# Patient Record
Sex: MALE | Race: WHITE | NOT HISPANIC OR LATINO | Employment: FULL TIME | URBAN - METROPOLITAN AREA
[De-identification: names, ages, dates, MRNs, and addresses within clinical notes are randomized per-mention and may not be internally consistent; named-entity substitution may affect disease eponyms.]

---

## 2017-01-12 ENCOUNTER — ALLSCRIPTS OFFICE VISIT (OUTPATIENT)
Dept: OTHER | Facility: OTHER | Age: 37
End: 2017-01-12

## 2017-01-13 ENCOUNTER — ALLSCRIPTS OFFICE VISIT (OUTPATIENT)
Dept: OTHER | Facility: OTHER | Age: 37
End: 2017-01-13

## 2017-01-23 ENCOUNTER — ALLSCRIPTS OFFICE VISIT (OUTPATIENT)
Dept: OTHER | Facility: OTHER | Age: 37
End: 2017-01-23

## 2017-01-23 ENCOUNTER — ANESTHESIA EVENT (OUTPATIENT)
Dept: PERIOP | Facility: HOSPITAL | Age: 37
End: 2017-01-23
Payer: COMMERCIAL

## 2017-01-23 DIAGNOSIS — I10 ESSENTIAL (PRIMARY) HYPERTENSION: ICD-10-CM

## 2017-01-23 DIAGNOSIS — I25.10 ATHEROSCLEROTIC HEART DISEASE OF NATIVE CORONARY ARTERY WITHOUT ANGINA PECTORIS: ICD-10-CM

## 2017-01-23 LAB
BASOPHILS # BLD AUTO: 0 %
BASOPHILS # BLD AUTO: 0 X10E3/UL (ref 0–0.2)
DEPRECATED RDW RBC AUTO: 13.5 % (ref 12.3–15.4)
EOSINOPHIL # BLD AUTO: 0.2 X10E3/UL (ref 0–0.4)
EOSINOPHIL # BLD AUTO: 3 %
HCT VFR BLD AUTO: 43.8 % (ref 37.5–51)
HGB BLD-MCNC: 14.5 G/DL (ref 12.6–17.7)
IMM.GRANULOCYTES (CD4/8) (HISTORICAL): 0 %
IMM.GRANULOCYTES (CD4/8) (HISTORICAL): 0 X10E3/UL (ref 0–0.1)
LYMPHOCYTES # BLD AUTO: 2.2 X10E3/UL (ref 0.7–3.1)
LYMPHOCYTES # BLD AUTO: 27 %
MCH RBC QN AUTO: 29.4 PG (ref 26.6–33)
MCHC RBC AUTO-ENTMCNC: 33.1 G/DL (ref 31.5–35.7)
MCV RBC AUTO: 89 FL (ref 79–97)
MONOCYTES # BLD AUTO: 0.5 X10E3/UL (ref 0.1–0.9)
MONOCYTES (HISTORICAL): 7 %
NEUTROPHILS # BLD AUTO: 5.1 X10E3/UL (ref 1.4–7)
NEUTROPHILS # BLD AUTO: 63 %
PLATELET # BLD AUTO: 279 X10E3/UL (ref 150–379)
RBC (HISTORICAL): 4.94 X10E6/UL (ref 4.14–5.8)
WBC # BLD AUTO: 8.1 X10E3/UL (ref 3.4–10.8)

## 2017-01-24 ENCOUNTER — ANESTHESIA (OUTPATIENT)
Dept: PERIOP | Facility: HOSPITAL | Age: 37
End: 2017-01-24
Payer: COMMERCIAL

## 2017-01-24 ENCOUNTER — HOSPITAL ENCOUNTER (OUTPATIENT)
Facility: HOSPITAL | Age: 37
Setting detail: OUTPATIENT SURGERY
Discharge: HOME | End: 2017-01-24
Attending: SPECIALIST | Admitting: SPECIALIST
Payer: COMMERCIAL

## 2017-01-24 VITALS
HEART RATE: 83 BPM | SYSTOLIC BLOOD PRESSURE: 159 MMHG | OXYGEN SATURATION: 93 % | RESPIRATION RATE: 23 BRPM | DIASTOLIC BLOOD PRESSURE: 83 MMHG | TEMPERATURE: 98.1 F

## 2017-01-24 DIAGNOSIS — L05.01 PILONIDAL CYST WITH ABSCESS: ICD-10-CM

## 2017-01-24 LAB — GLUCOSE SERPL-MCNC: 136 MG/DL (ref 65–140)

## 2017-01-24 PROCEDURE — 87070 CULTURE OTHR SPECIMN AEROBIC: CPT | Performed by: SPECIALIST

## 2017-01-24 PROCEDURE — 87147 CULTURE TYPE IMMUNOLOGIC: CPT | Performed by: SPECIALIST

## 2017-01-24 PROCEDURE — 82948 REAGENT STRIP/BLOOD GLUCOSE: CPT

## 2017-01-24 PROCEDURE — 87185 SC STD ENZYME DETCJ PER NZM: CPT | Performed by: SPECIALIST

## 2017-01-24 PROCEDURE — 87176 TISSUE HOMOGENIZATION CULTR: CPT | Performed by: SPECIALIST

## 2017-01-24 PROCEDURE — 87075 CULTR BACTERIA EXCEPT BLOOD: CPT | Performed by: SPECIALIST

## 2017-01-24 PROCEDURE — 87205 SMEAR GRAM STAIN: CPT | Performed by: SPECIALIST

## 2017-01-24 RX ORDER — ONDANSETRON 2 MG/ML
4 INJECTION INTRAMUSCULAR; INTRAVENOUS ONCE
Status: DISCONTINUED | OUTPATIENT
Start: 2017-01-24 | End: 2017-01-24 | Stop reason: HOSPADM

## 2017-01-24 RX ORDER — BUPIVACAINE HYDROCHLORIDE 5 MG/ML
INJECTION, SOLUTION PERINEURAL AS NEEDED
Status: DISCONTINUED | OUTPATIENT
Start: 2017-01-24 | End: 2017-01-24 | Stop reason: HOSPADM

## 2017-01-24 RX ORDER — FENTANYL CITRATE 50 UG/ML
INJECTION, SOLUTION INTRAMUSCULAR; INTRAVENOUS AS NEEDED
Status: DISCONTINUED | OUTPATIENT
Start: 2017-01-24 | End: 2017-01-24 | Stop reason: SURG

## 2017-01-24 RX ORDER — LEVOFLOXACIN 750 MG/1
750 TABLET ORAL DAILY
Qty: 10 TABLET | Refills: 0 | Status: SHIPPED | OUTPATIENT
Start: 2017-01-24 | End: 2017-02-03

## 2017-01-24 RX ORDER — FENTANYL CITRATE/PF 50 MCG/ML
25 SYRINGE (ML) INJECTION
Status: DISCONTINUED | OUTPATIENT
Start: 2017-01-24 | End: 2017-01-24 | Stop reason: HOSPADM

## 2017-01-24 RX ORDER — LIDOCAINE HYDROCHLORIDE AND EPINEPHRINE 10; 10 MG/ML; UG/ML
INJECTION, SOLUTION INFILTRATION; PERINEURAL AS NEEDED
Status: DISCONTINUED | OUTPATIENT
Start: 2017-01-24 | End: 2017-01-24 | Stop reason: HOSPADM

## 2017-01-24 RX ORDER — SODIUM CHLORIDE, SODIUM LACTATE, POTASSIUM CHLORIDE, CALCIUM CHLORIDE 600; 310; 30; 20 MG/100ML; MG/100ML; MG/100ML; MG/100ML
125 INJECTION, SOLUTION INTRAVENOUS CONTINUOUS
Status: DISCONTINUED | OUTPATIENT
Start: 2017-01-24 | End: 2017-01-24 | Stop reason: HOSPADM

## 2017-01-24 RX ORDER — ONDANSETRON 2 MG/ML
INJECTION INTRAMUSCULAR; INTRAVENOUS AS NEEDED
Status: DISCONTINUED | OUTPATIENT
Start: 2017-01-24 | End: 2017-01-24 | Stop reason: SURG

## 2017-01-24 RX ORDER — MIDAZOLAM HYDROCHLORIDE 1 MG/ML
INJECTION INTRAMUSCULAR; INTRAVENOUS AS NEEDED
Status: DISCONTINUED | OUTPATIENT
Start: 2017-01-24 | End: 2017-01-24 | Stop reason: SURG

## 2017-01-24 RX ORDER — PROPOFOL 10 MG/ML
INJECTION, EMULSION INTRAVENOUS AS NEEDED
Status: DISCONTINUED | OUTPATIENT
Start: 2017-01-24 | End: 2017-01-24 | Stop reason: SURG

## 2017-01-24 RX ORDER — OXYCODONE AND ACETAMINOPHEN 10; 325 MG/1; MG/1
1 TABLET ORAL EVERY 4 HOURS PRN
Qty: 28 TABLET | Refills: 0 | Status: SHIPPED | OUTPATIENT
Start: 2017-01-24 | End: 2017-02-03

## 2017-01-24 RX ORDER — LEVOFLOXACIN 5 MG/ML
500 INJECTION, SOLUTION INTRAVENOUS ONCE
Status: COMPLETED | OUTPATIENT
Start: 2017-01-24 | End: 2017-01-24

## 2017-01-24 RX ADMIN — FENTANYL CITRATE 100 MCG: 50 INJECTION, SOLUTION INTRAMUSCULAR; INTRAVENOUS at 13:34

## 2017-01-24 RX ADMIN — LEVOFLOXACIN 500 MG: 5 INJECTION, SOLUTION INTRAVENOUS at 12:37

## 2017-01-24 RX ADMIN — MIDAZOLAM HYDROCHLORIDE 2 MG: 1 INJECTION, SOLUTION INTRAMUSCULAR; INTRAVENOUS at 13:28

## 2017-01-24 RX ADMIN — DEXAMETHASONE SODIUM PHOSPHATE 8 MG: 10 INJECTION INTRAMUSCULAR; INTRAVENOUS at 13:54

## 2017-01-24 RX ADMIN — PROPOFOL 200 MG: 10 INJECTION, EMULSION INTRAVENOUS at 13:34

## 2017-01-24 RX ADMIN — SODIUM CHLORIDE, SODIUM LACTATE, POTASSIUM CHLORIDE, AND CALCIUM CHLORIDE: .6; .31; .03; .02 INJECTION, SOLUTION INTRAVENOUS at 13:27

## 2017-01-24 RX ADMIN — ONDANSETRON 4 MG: 2 INJECTION INTRAMUSCULAR; INTRAVENOUS at 13:54

## 2017-01-24 RX ADMIN — PROPOFOL 100 MG: 10 INJECTION, EMULSION INTRAVENOUS at 13:37

## 2017-01-26 LAB
BACTERIA TISS AEROBE CULT: NORMAL
GRAM STN SPEC: NORMAL

## 2017-01-28 LAB
BACTERIA SPEC ANAEROBE CULT: NORMAL
BACTERIA SPEC ANAEROBE CULT: NORMAL

## 2017-01-30 ENCOUNTER — ALLSCRIPTS OFFICE VISIT (OUTPATIENT)
Dept: OTHER | Facility: OTHER | Age: 37
End: 2017-01-30

## 2017-02-02 ENCOUNTER — ALLSCRIPTS OFFICE VISIT (OUTPATIENT)
Dept: OTHER | Facility: OTHER | Age: 37
End: 2017-02-02

## 2017-02-23 ENCOUNTER — ALLSCRIPTS OFFICE VISIT (OUTPATIENT)
Dept: OTHER | Facility: OTHER | Age: 37
End: 2017-02-23

## 2017-06-07 ENCOUNTER — ALLSCRIPTS OFFICE VISIT (OUTPATIENT)
Dept: OTHER | Facility: OTHER | Age: 37
End: 2017-06-07

## 2017-07-10 ENCOUNTER — ALLSCRIPTS OFFICE VISIT (OUTPATIENT)
Dept: OTHER | Facility: OTHER | Age: 37
End: 2017-07-10

## 2018-01-10 NOTE — MISCELLANEOUS
Provider Comments  Provider Comments:   Patient has an appointment today and was left a message to carmel since he missed his appointment if patient needed to be compliant      Signatures   Electronically signed by : MILDRED Coleman ; Jun 7 2017  4:05PM EST                       (Author)

## 2018-01-12 VITALS
TEMPERATURE: 98 F | HEIGHT: 69 IN | BODY MASS INDEX: 46.65 KG/M2 | DIASTOLIC BLOOD PRESSURE: 82 MMHG | HEART RATE: 87 BPM | SYSTOLIC BLOOD PRESSURE: 118 MMHG | OXYGEN SATURATION: 95 % | WEIGHT: 315 LBS

## 2018-01-13 VITALS
TEMPERATURE: 97.3 F | HEIGHT: 69 IN | RESPIRATION RATE: 12 BRPM | HEART RATE: 101 BPM | SYSTOLIC BLOOD PRESSURE: 150 MMHG | WEIGHT: 315 LBS | OXYGEN SATURATION: 97 % | DIASTOLIC BLOOD PRESSURE: 84 MMHG | BODY MASS INDEX: 46.65 KG/M2

## 2018-01-13 VITALS
TEMPERATURE: 98.6 F | OXYGEN SATURATION: 94 % | DIASTOLIC BLOOD PRESSURE: 76 MMHG | SYSTOLIC BLOOD PRESSURE: 120 MMHG | WEIGHT: 315 LBS | HEIGHT: 69 IN | HEART RATE: 128 BPM | BODY MASS INDEX: 46.65 KG/M2

## 2018-01-13 VITALS
SYSTOLIC BLOOD PRESSURE: 130 MMHG | HEART RATE: 92 BPM | TEMPERATURE: 98.6 F | DIASTOLIC BLOOD PRESSURE: 80 MMHG | BODY MASS INDEX: 46.65 KG/M2 | HEIGHT: 69 IN | OXYGEN SATURATION: 96 % | WEIGHT: 315 LBS

## 2018-01-14 VITALS
RESPIRATION RATE: 18 BRPM | HEART RATE: 90 BPM | DIASTOLIC BLOOD PRESSURE: 80 MMHG | BODY MASS INDEX: 46.65 KG/M2 | SYSTOLIC BLOOD PRESSURE: 122 MMHG | OXYGEN SATURATION: 95 % | HEIGHT: 69 IN | WEIGHT: 315 LBS

## 2018-01-14 VITALS
OXYGEN SATURATION: 97 % | HEIGHT: 69 IN | DIASTOLIC BLOOD PRESSURE: 76 MMHG | HEART RATE: 75 BPM | SYSTOLIC BLOOD PRESSURE: 140 MMHG | BODY MASS INDEX: 46.65 KG/M2 | WEIGHT: 315 LBS

## 2018-01-14 VITALS
HEART RATE: 95 BPM | DIASTOLIC BLOOD PRESSURE: 82 MMHG | WEIGHT: 315 LBS | BODY MASS INDEX: 46.65 KG/M2 | SYSTOLIC BLOOD PRESSURE: 138 MMHG | TEMPERATURE: 98.8 F | RESPIRATION RATE: 12 BRPM | HEIGHT: 69 IN | OXYGEN SATURATION: 95 %

## 2018-04-05 RX ORDER — GLIMEPIRIDE 2 MG/1
TABLET ORAL
Qty: 180 TABLET | Refills: 0 | Status: CANCELLED | OUTPATIENT
Start: 2018-04-05

## 2018-04-23 RX ORDER — GLIMEPIRIDE 2 MG/1
2 TABLET ORAL
Refills: 0 | Status: CANCELLED | OUTPATIENT
Start: 2018-04-23

## 2018-04-30 DIAGNOSIS — IMO0002 DM TYPE 2, UNCONTROLLED, WITH NEUROPATHY: Primary | ICD-10-CM

## 2018-05-02 PROBLEM — IMO0002 DIABETES MELLITUS OUT OF CONTROL: Status: ACTIVE | Noted: 2017-01-13

## 2018-05-02 PROBLEM — E78.5 DYSLIPIDEMIA: Status: ACTIVE | Noted: 2017-01-23

## 2018-05-02 RX ORDER — GLIMEPIRIDE 2 MG/1
2 TABLET ORAL
Qty: 30 TABLET | Refills: 1 | Status: SHIPPED | OUTPATIENT
Start: 2018-05-02 | End: 2018-07-26 | Stop reason: SDUPTHER

## 2018-05-02 NOTE — TELEPHONE ENCOUNTER
Send glimepiride to patient's pharmacy  Please call the patient to make an appointment with us and also he can't  his glimepiride from pharmacy

## 2018-05-09 DIAGNOSIS — IMO0002 DM TYPE 2, UNCONTROLLED, WITH NEUROPATHY: ICD-10-CM

## 2018-05-17 RX ORDER — GLIMEPIRIDE 2 MG/1
2 TABLET ORAL
Qty: 30 TABLET | Refills: 0 | OUTPATIENT
Start: 2018-05-17

## 2018-06-02 DIAGNOSIS — I10 ESSENTIAL HYPERTENSION: Primary | ICD-10-CM

## 2018-06-03 RX ORDER — LISINOPRIL 10 MG/1
TABLET ORAL
Qty: 90 TABLET | Refills: 2 | OUTPATIENT
Start: 2018-06-03

## 2018-06-19 RX ORDER — AMLODIPINE BESYLATE 10 MG/1
TABLET ORAL
Qty: 90 TABLET | Refills: 0 | Status: SHIPPED | OUTPATIENT
Start: 2018-06-19 | End: 2018-07-26 | Stop reason: SDUPTHER

## 2018-07-09 DIAGNOSIS — IMO0002 DM TYPE 2, UNCONTROLLED, WITH NEUROPATHY: ICD-10-CM

## 2018-07-09 RX ORDER — GLIMEPIRIDE 2 MG/1
TABLET ORAL
Qty: 30 TABLET | Refills: 1 | OUTPATIENT
Start: 2018-07-09

## 2018-07-26 ENCOUNTER — OFFICE VISIT (OUTPATIENT)
Dept: FAMILY MEDICINE CLINIC | Facility: CLINIC | Age: 38
End: 2018-07-26

## 2018-07-26 VITALS
HEART RATE: 106 BPM | BODY MASS INDEX: 55.53 KG/M2 | SYSTOLIC BLOOD PRESSURE: 148 MMHG | WEIGHT: 315 LBS | DIASTOLIC BLOOD PRESSURE: 82 MMHG | OXYGEN SATURATION: 94 % | RESPIRATION RATE: 16 BRPM

## 2018-07-26 DIAGNOSIS — I10 ESSENTIAL HYPERTENSION: ICD-10-CM

## 2018-07-26 DIAGNOSIS — IMO0002 DM TYPE 2, UNCONTROLLED, WITH NEUROPATHY: Primary | ICD-10-CM

## 2018-07-26 DIAGNOSIS — E78.5 DYSLIPIDEMIA: ICD-10-CM

## 2018-07-26 PROBLEM — E11.8 TYPE 2 DIABETES MELLITUS WITH COMPLICATION, WITHOUT LONG-TERM CURRENT USE OF INSULIN (HCC): Status: ACTIVE | Noted: 2018-07-26

## 2018-07-26 PROCEDURE — 99213 OFFICE O/P EST LOW 20 MIN: CPT | Performed by: FAMILY MEDICINE

## 2018-07-26 PROCEDURE — 83036 HEMOGLOBIN GLYCOSYLATED A1C: CPT | Performed by: FAMILY MEDICINE

## 2018-07-26 RX ORDER — SIMVASTATIN 40 MG
40 TABLET ORAL
Qty: 30 TABLET | Refills: 3 | Status: SHIPPED | OUTPATIENT
Start: 2018-07-26 | End: 2020-12-09 | Stop reason: CLARIF

## 2018-07-26 RX ORDER — CARVEDILOL 3.12 MG/1
3.12 TABLET ORAL 2 TIMES DAILY WITH MEALS
Qty: 30 TABLET | Refills: 3 | Status: SHIPPED | OUTPATIENT
Start: 2018-07-26 | End: 2018-10-24 | Stop reason: SDUPTHER

## 2018-07-26 RX ORDER — GLIMEPIRIDE 2 MG/1
2 TABLET ORAL
Qty: 30 TABLET | Refills: 3 | Status: SHIPPED | OUTPATIENT
Start: 2018-07-26 | End: 2018-07-26 | Stop reason: SDUPTHER

## 2018-07-26 RX ORDER — GLIMEPIRIDE 2 MG/1
4 TABLET ORAL
Qty: 30 TABLET | Refills: 0 | Status: SHIPPED | OUTPATIENT
Start: 2018-07-26 | End: 2018-07-26 | Stop reason: CLARIF

## 2018-07-26 RX ORDER — AMLODIPINE BESYLATE 10 MG/1
10 TABLET ORAL DAILY
Qty: 90 TABLET | Refills: 1 | Status: SHIPPED | OUTPATIENT
Start: 2018-07-26 | End: 2019-04-04 | Stop reason: SDUPTHER

## 2018-07-26 RX ORDER — LISINOPRIL 10 MG/1
10 TABLET ORAL DAILY
Qty: 30 TABLET | Refills: 3 | Status: SHIPPED | OUTPATIENT
Start: 2018-07-26 | End: 2018-11-06 | Stop reason: SDUPTHER

## 2018-07-26 RX ORDER — GLIMEPIRIDE 4 MG/1
4 TABLET ORAL
Qty: 30 TABLET | Refills: 3 | Status: SHIPPED | OUTPATIENT
Start: 2018-07-26 | End: 2018-11-06 | Stop reason: SDUPTHER

## 2018-07-26 NOTE — PROGRESS NOTES
Assessment/Plan:    HTN (hypertension)  A/P:BP not at goal, 148/82 during this visit  Advised patient and being more compliant with his medications  Will continue current regimen of amlodipine 10 mg daily, Coreg 3 125 mg daily and lisinopril 10 mg daily  Advised patient to monitor blood pressures at home and to follow up in 1 month  DM type 2, uncontrolled, with neuropathy ([facility] )  Lab Results   Component Value Date    HGBA1C 8 4 (H) 12/29/2016       No results for input(s): POCGLU in the last 72 hours  Blood Sugar Average: Last 72 hrs:  A/P:Worsening of A1c from 8 4 in December to now 10 4, likely a combination of noncompliance and not adhering to diabetic diet  Will increase glimepiride from 2 mg to 4 mg daily and continue metformin 1000 mg b i d  Received requisition for microalbumin/creatinine ratio  Follow-up in 3 months  Diagnoses and all orders for this visit:    DM type 2, uncontrolled, with neuropathy ([facility] )  -     Discontinue: glimepiride (AMARYL) 2 mg tablet; Take 1 tablet (2 mg total) by mouth daily with breakfast  -     metFORMIN (GLUCOPHAGE) 1000 MG tablet; Take 1 tablet (1,000 mg total) by mouth 2 (two) times a day with meals  -     Microalbumin / creatinine urine ratio  -     Ambulatory referral to Ophthalmology; Future  -     Ambulatory referral to Nutrition Services; Future  -     Discontinue: glimepiride (AMARYL) 2 mg tablet; Take 2 tablets (4 mg total) by mouth daily with breakfast  -     glimepiride (AMARYL) 4 mg tablet; Take 1 tablet (4 mg total) by mouth daily with breakfast    Essential hypertension  -     amLODIPine (NORVASC) 10 mg tablet; Take 1 tablet (10 mg total) by mouth daily  -     carvedilol (COREG) 3 125 mg tablet; Take 1 tablet (3 125 mg total) by mouth 2 (two) times a day with meals  -     lisinopril (ZESTRIL) 10 mg tablet;  Take 1 tablet (10 mg total) by mouth daily  -     Microalbumin / creatinine urine ratio    Dyslipidemia  -     simvastatin (ZOCOR) 40 mg tablet; Take 1 tablet (40 mg total) by mouth daily at bedtime          Subjective:      Patient ID: Gloria Chew is a 41 y o  male  Patient is a 20-year-old male with history of hypertension, diabetes mellitus type 2, CAD, morbid obesity, and hyperlipidemia here for medication refill  Patient is currently on metformin 1000 mg b i d  and glimepiride 2 mg daily however he has ran out of glimepiride for a while and has not gotten a refill  Patient also takes amlodipine, Coreg and lisinopril but was also out of Coreg for a while  Patient states his sugars are around 200  Last A1c of 8 4 was on 12/29/2017  Recheck of A1c in office today was 10 4  No microalbumin/creatinine on record  Diabetic foot exam done during last visit, but patient has not gotten his eyes checked in a long time  States he does not have insurance to go to and eye doctor  Counseled patient on making an appointment at the  for the free eye clinic  BP during this visit was 148/82  Patient denies any headaches, vision changes, chest pain, SOB, abdominal or urinary symptoms  The following portions of the patient's history were reviewed and updated as appropriate: allergies, current medications, past family history, past medical history, past social history, past surgical history and problem list     Review of Systems   Constitutional: Negative for appetite change and fatigue  HENT: Negative  Eyes: Negative for visual disturbance  Respiratory: Negative for shortness of breath  Cardiovascular: Negative for chest pain  Gastrointestinal: Negative  Negative for abdominal pain, diarrhea, nausea and vomiting  Endocrine: Negative for polydipsia and polyuria  Genitourinary: Negative  Musculoskeletal: Negative  Skin: Negative  Neurological: Negative  Psychiatric/Behavioral: Negative            Objective:      /82 (BP Location: Left arm, Patient Position: Sitting)   Pulse (!) 106   Resp 16   Wt (!) 171 kg (376 lb)   SpO2 94%   BMI 55 53 kg/m²          Physical Exam   Constitutional: He is oriented to person, place, and time  He appears well-developed and well-nourished  No distress  HENT:   Head: Normocephalic and atraumatic  Nose: Nose normal    Mouth/Throat: Oropharynx is clear and moist    Eyes: Conjunctivae and EOM are normal  Pupils are equal, round, and reactive to light  Neck: Normal range of motion  Neck supple  Cardiovascular: Normal rate, regular rhythm and normal heart sounds  Pulmonary/Chest: Effort normal and breath sounds normal  No respiratory distress  He has no wheezes  He has no rales  He exhibits no tenderness  Abdominal: Soft  Bowel sounds are normal  He exhibits no distension and no mass  There is no tenderness  There is no rebound and no guarding  Musculoskeletal: Normal range of motion  He exhibits no edema, tenderness or deformity  Lymphadenopathy:     He has no cervical adenopathy  Neurological: He is alert and oriented to person, place, and time  Skin: Skin is warm and dry  No rash noted  No erythema  No pallor  Psychiatric: He has a normal mood and affect  Nursing note and vitals reviewed

## 2018-07-27 LAB — SL AMB POCT HEMOGLOBIN AIC: 10.4

## 2018-07-27 NOTE — ASSESSMENT & PLAN NOTE
A/P:BP not at goal, 148/82 during this visit  Advised patient and being more compliant with his medications  Will continue current regimen of amlodipine 10 mg daily, Coreg 3 125 mg daily and lisinopril 10 mg daily  Advised patient to monitor blood pressures at home and to follow up in 1 month

## 2018-07-27 NOTE — ASSESSMENT & PLAN NOTE
Lab Results   Component Value Date    HGBA1C 8 4 (H) 12/29/2016       No results for input(s): POCGLU in the last 72 hours  Blood Sugar Average: Last 72 hrs:  A/P:Worsening of A1c from 8 4 in December to now 10 4, likely a combination of noncompliance and not adhering to diabetic diet  Will increase glimepiride from 2 mg to 4 mg daily and continue metformin 1000 mg b i d  Received requisition for microalbumin/creatinine ratio  Follow-up in 3 months

## 2018-10-24 DIAGNOSIS — I10 ESSENTIAL HYPERTENSION: ICD-10-CM

## 2018-11-06 DIAGNOSIS — IMO0002 DM TYPE 2, UNCONTROLLED, WITH NEUROPATHY: ICD-10-CM

## 2018-11-06 DIAGNOSIS — I10 ESSENTIAL HYPERTENSION: ICD-10-CM

## 2018-11-06 RX ORDER — LISINOPRIL 10 MG/1
TABLET ORAL
Qty: 30 TABLET | Refills: 3 | Status: SHIPPED | OUTPATIENT
Start: 2018-11-06 | End: 2019-04-06 | Stop reason: SDUPTHER

## 2018-11-06 RX ORDER — CARVEDILOL 3.12 MG/1
TABLET ORAL
Qty: 30 TABLET | Refills: 3 | Status: SHIPPED | OUTPATIENT
Start: 2018-11-06 | End: 2018-11-06 | Stop reason: SDUPTHER

## 2018-11-06 RX ORDER — CARVEDILOL 3.12 MG/1
TABLET ORAL
Qty: 30 TABLET | Refills: 3 | Status: SHIPPED | OUTPATIENT
Start: 2018-11-06 | End: 2019-04-06 | Stop reason: SDUPTHER

## 2018-11-06 RX ORDER — GLIMEPIRIDE 4 MG/1
TABLET ORAL
Qty: 30 TABLET | Refills: 3 | Status: SHIPPED | OUTPATIENT
Start: 2018-11-06 | End: 2019-04-06 | Stop reason: SDUPTHER

## 2018-11-26 RX ORDER — CARVEDILOL 3.12 MG/1
TABLET ORAL
Qty: 60 TABLET | Refills: 5 | OUTPATIENT
Start: 2018-11-26

## 2019-04-01 DIAGNOSIS — IMO0002 DM TYPE 2, UNCONTROLLED, WITH NEUROPATHY: ICD-10-CM

## 2019-04-01 DIAGNOSIS — I10 ESSENTIAL HYPERTENSION: ICD-10-CM

## 2019-04-02 RX ORDER — AMLODIPINE BESYLATE 10 MG/1
TABLET ORAL
Qty: 90 TABLET | Refills: 0 | OUTPATIENT
Start: 2019-04-02

## 2019-04-04 DIAGNOSIS — I10 ESSENTIAL HYPERTENSION: ICD-10-CM

## 2019-04-04 RX ORDER — AMLODIPINE BESYLATE 10 MG/1
10 TABLET ORAL DAILY
Qty: 90 TABLET | Refills: 0 | Status: SHIPPED | OUTPATIENT
Start: 2019-04-04 | End: 2019-08-14 | Stop reason: SDUPTHER

## 2019-04-06 DIAGNOSIS — IMO0002 DM TYPE 2, UNCONTROLLED, WITH NEUROPATHY: ICD-10-CM

## 2019-04-06 DIAGNOSIS — I10 ESSENTIAL HYPERTENSION: ICD-10-CM

## 2019-04-06 RX ORDER — GLIMEPIRIDE 4 MG/1
TABLET ORAL
Qty: 30 TABLET | Refills: 5 | Status: SHIPPED | OUTPATIENT
Start: 2019-04-06 | End: 2020-02-17 | Stop reason: SDUPTHER

## 2019-04-06 RX ORDER — GLIMEPIRIDE 4 MG/1
TABLET ORAL
Qty: 30 TABLET | Refills: 5 | Status: SHIPPED | OUTPATIENT
Start: 2019-04-06 | End: 2020-02-17

## 2019-04-06 RX ORDER — LISINOPRIL 10 MG/1
TABLET ORAL
Qty: 30 TABLET | Refills: 3 | Status: SHIPPED | OUTPATIENT
Start: 2019-04-06 | End: 2020-11-09 | Stop reason: SDUPTHER

## 2019-04-06 RX ORDER — LISINOPRIL 10 MG/1
TABLET ORAL
Qty: 30 TABLET | Refills: 5 | Status: SHIPPED | OUTPATIENT
Start: 2019-04-06 | End: 2020-02-28

## 2019-04-06 RX ORDER — CARVEDILOL 3.12 MG/1
TABLET ORAL
Qty: 30 TABLET | Refills: 5 | Status: SHIPPED | OUTPATIENT
Start: 2019-04-06 | End: 2019-10-24 | Stop reason: SDUPTHER

## 2019-07-10 DIAGNOSIS — IMO0002 DM TYPE 2, UNCONTROLLED, WITH NEUROPATHY: ICD-10-CM

## 2019-07-13 DIAGNOSIS — IMO0002 DM TYPE 2, UNCONTROLLED, WITH NEUROPATHY: ICD-10-CM

## 2019-08-14 DIAGNOSIS — I10 ESSENTIAL HYPERTENSION: ICD-10-CM

## 2019-08-15 RX ORDER — AMLODIPINE BESYLATE 10 MG/1
TABLET ORAL
Qty: 90 TABLET | Refills: 0 | Status: SHIPPED | OUTPATIENT
Start: 2019-08-15 | End: 2019-11-26 | Stop reason: SDUPTHER

## 2019-10-24 DIAGNOSIS — I10 ESSENTIAL HYPERTENSION: ICD-10-CM

## 2019-10-25 DIAGNOSIS — I10 ESSENTIAL HYPERTENSION: ICD-10-CM

## 2019-10-25 RX ORDER — CARVEDILOL 3.12 MG/1
3.12 TABLET ORAL 2 TIMES DAILY WITH MEALS
Qty: 30 TABLET | Refills: 5 | Status: SHIPPED | OUTPATIENT
Start: 2019-10-25 | End: 2020-04-27

## 2019-10-25 RX ORDER — CARVEDILOL 3.12 MG/1
TABLET ORAL
Qty: 30 TABLET | Refills: 3 | Status: SHIPPED | OUTPATIENT
Start: 2019-10-25 | End: 2019-10-25 | Stop reason: SDUPTHER

## 2019-11-26 DIAGNOSIS — I10 ESSENTIAL HYPERTENSION: ICD-10-CM

## 2019-11-27 RX ORDER — AMLODIPINE BESYLATE 10 MG/1
TABLET ORAL
Qty: 90 TABLET | Refills: 0 | Status: SHIPPED | OUTPATIENT
Start: 2019-11-27 | End: 2020-02-28

## 2020-02-17 DIAGNOSIS — IMO0002 DM TYPE 2, UNCONTROLLED, WITH NEUROPATHY: ICD-10-CM

## 2020-02-17 RX ORDER — GLIMEPIRIDE 4 MG/1
4 TABLET ORAL
Qty: 30 TABLET | Refills: 5 | Status: SHIPPED | OUTPATIENT
Start: 2020-02-17 | End: 2020-11-29

## 2020-02-17 RX ORDER — GLIMEPIRIDE 4 MG/1
TABLET ORAL
Qty: 30 TABLET | Refills: 5 | Status: SHIPPED | OUTPATIENT
Start: 2020-02-17 | End: 2020-12-20

## 2020-02-27 DIAGNOSIS — I10 ESSENTIAL HYPERTENSION: ICD-10-CM

## 2020-02-28 RX ORDER — LISINOPRIL 10 MG/1
TABLET ORAL
Qty: 30 TABLET | Refills: 5 | Status: SHIPPED | OUTPATIENT
Start: 2020-02-28 | End: 2020-12-23 | Stop reason: SDUPTHER

## 2020-02-28 RX ORDER — AMLODIPINE BESYLATE 10 MG/1
TABLET ORAL
Qty: 90 TABLET | Refills: 0 | Status: SHIPPED | OUTPATIENT
Start: 2020-02-28 | End: 2020-06-08

## 2020-04-24 DIAGNOSIS — I10 ESSENTIAL HYPERTENSION: ICD-10-CM

## 2020-04-27 RX ORDER — CARVEDILOL 3.12 MG/1
TABLET ORAL
Qty: 30 TABLET | Refills: 5 | Status: SHIPPED | OUTPATIENT
Start: 2020-04-27 | End: 2021-01-26 | Stop reason: SDUPTHER

## 2020-04-29 ENCOUNTER — TELEPHONE (OUTPATIENT)
Dept: OTHER | Facility: OTHER | Age: 40
End: 2020-04-29

## 2020-04-30 ENCOUNTER — TELEMEDICINE (OUTPATIENT)
Dept: FAMILY MEDICINE CLINIC | Facility: CLINIC | Age: 40
End: 2020-04-30
Payer: OTHER GOVERNMENT

## 2020-04-30 DIAGNOSIS — Z20.828 EXPOSURE TO SARS-ASSOCIATED CORONAVIRUS: Primary | ICD-10-CM

## 2020-04-30 DIAGNOSIS — Z20.828 EXPOSURE TO SARS-ASSOCIATED CORONAVIRUS: ICD-10-CM

## 2020-04-30 PROCEDURE — U0003 INFECTIOUS AGENT DETECTION BY NUCLEIC ACID (DNA OR RNA); SEVERE ACUTE RESPIRATORY SYNDROME CORONAVIRUS 2 (SARS-COV-2) (CORONAVIRUS DISEASE [COVID-19]), AMPLIFIED PROBE TECHNIQUE, MAKING USE OF HIGH THROUGHPUT TECHNOLOGIES AS DESCRIBED BY CMS-2020-01-R: HCPCS

## 2020-04-30 PROCEDURE — G2012 BRIEF CHECK IN BY MD/QHP: HCPCS | Performed by: FAMILY MEDICINE

## 2020-05-01 ENCOUNTER — TELEPHONE (OUTPATIENT)
Dept: FAMILY MEDICINE CLINIC | Facility: CLINIC | Age: 40
End: 2020-05-01

## 2020-05-01 LAB — SARS-COV-2 RNA SPEC QL NAA+PROBE: NOT DETECTED

## 2020-06-07 DIAGNOSIS — I10 ESSENTIAL HYPERTENSION: ICD-10-CM

## 2020-06-08 RX ORDER — AMLODIPINE BESYLATE 10 MG/1
TABLET ORAL
Qty: 90 TABLET | Refills: 0 | Status: SHIPPED | OUTPATIENT
Start: 2020-06-08 | End: 2020-09-21

## 2020-09-21 DIAGNOSIS — I10 ESSENTIAL HYPERTENSION: ICD-10-CM

## 2020-09-21 RX ORDER — AMLODIPINE BESYLATE 10 MG/1
TABLET ORAL
Qty: 90 TABLET | Refills: 0 | Status: SHIPPED | OUTPATIENT
Start: 2020-09-21 | End: 2020-12-20

## 2020-10-13 DIAGNOSIS — IMO0002 DM TYPE 2, UNCONTROLLED, WITH NEUROPATHY: ICD-10-CM

## 2020-11-09 DIAGNOSIS — I10 ESSENTIAL HYPERTENSION: ICD-10-CM

## 2020-11-09 RX ORDER — LISINOPRIL 10 MG/1
10 TABLET ORAL DAILY
Qty: 30 TABLET | Refills: 3 | Status: SHIPPED | OUTPATIENT
Start: 2020-11-09 | End: 2020-11-29

## 2020-11-29 ENCOUNTER — APPOINTMENT (EMERGENCY)
Dept: RADIOLOGY | Facility: HOSPITAL | Age: 40
End: 2020-11-29

## 2020-11-29 ENCOUNTER — HOSPITAL ENCOUNTER (INPATIENT)
Facility: HOSPITAL | Age: 40
LOS: 3 days | Discharge: HOME | DRG: 247 | End: 2020-12-02
Attending: FAMILY MEDICINE | Admitting: FAMILY MEDICINE
Payer: COMMERCIAL

## 2020-11-29 ENCOUNTER — APPOINTMENT (INPATIENT)
Dept: RADIOLOGY | Facility: HOSPITAL | Age: 40
DRG: 247 | End: 2020-11-29
Payer: COMMERCIAL

## 2020-11-29 ENCOUNTER — APPOINTMENT (EMERGENCY)
Dept: RADIOLOGY | Facility: HOSPITAL | Age: 40
End: 2020-11-29
Attending: EMERGENCY MEDICINE

## 2020-11-29 ENCOUNTER — HOSPITAL ENCOUNTER (EMERGENCY)
Facility: HOSPITAL | Age: 40
End: 2020-11-29
Attending: EMERGENCY MEDICINE | Admitting: EMERGENCY MEDICINE
Payer: COMMERCIAL

## 2020-11-29 VITALS
RESPIRATION RATE: 21 BRPM | WEIGHT: 315 LBS | HEART RATE: 82 BPM | SYSTOLIC BLOOD PRESSURE: 147 MMHG | OXYGEN SATURATION: 93 % | TEMPERATURE: 97.1 F | DIASTOLIC BLOOD PRESSURE: 70 MMHG | BODY MASS INDEX: 56.45 KG/M2

## 2020-11-29 DIAGNOSIS — R19.09 LEFT GROIN MASS: ICD-10-CM

## 2020-11-29 DIAGNOSIS — L03.116 CELLULITIS OF LEFT LOWER EXTREMITY: ICD-10-CM

## 2020-11-29 DIAGNOSIS — I25.10 CAD (CORONARY ARTERY DISEASE): Primary | ICD-10-CM

## 2020-11-29 DIAGNOSIS — R59.9 LYMPH NODES ENLARGED: ICD-10-CM

## 2020-11-29 DIAGNOSIS — R07.9 CHEST PAIN, UNSPECIFIED: Primary | ICD-10-CM

## 2020-11-29 DIAGNOSIS — L05.91 PILONIDAL CYST: ICD-10-CM

## 2020-11-29 DIAGNOSIS — I10 ESSENTIAL HYPERTENSION: ICD-10-CM

## 2020-11-29 LAB
ALBUMIN SERPL BCP-MCNC: 2.6 G/DL (ref 3.5–5)
ALP SERPL-CCNC: 86 U/L (ref 46–116)
ALT SERPL W P-5'-P-CCNC: 22 U/L (ref 12–78)
ANION GAP SERPL CALCULATED.3IONS-SCNC: 6 MMOL/L (ref 4–13)
APTT PPP: 28 SECONDS (ref 23–37)
AST SERPL W P-5'-P-CCNC: 58 U/L (ref 5–45)
ATRIAL RATE: 72 BPM
BASOPHILS # BLD AUTO: 0.05 THOUSANDS/ΜL (ref 0–0.1)
BASOPHILS NFR BLD AUTO: 0 % (ref 0–1)
BILIRUB SERPL-MCNC: 0.5 MG/DL (ref 0.2–1)
BUN SERPL-MCNC: 17 MG/DL (ref 5–25)
CALCIUM ALBUM COR SERPL-MCNC: 10 MG/DL (ref 8.3–10.1)
CALCIUM SERPL-MCNC: 8.9 MG/DL (ref 8.3–10.1)
CHLORIDE SERPL-SCNC: 96 MMOL/L (ref 100–108)
CHOLEST SERPL-MCNC: 195 MG/DL (ref 50–200)
CO2 SERPL-SCNC: 30 MMOL/L (ref 21–32)
CREAT SERPL-MCNC: 1.12 MG/DL (ref 0.6–1.3)
EOSINOPHIL # BLD AUTO: 0.28 THOUSAND/ΜL (ref 0–0.61)
EOSINOPHIL NFR BLD AUTO: 2 % (ref 0–6)
ERYTHROCYTE [DISTWIDTH] IN BLOOD BY AUTOMATED COUNT: 13.6 % (ref 11.6–15.1)
EST. AVERAGE GLUCOSE BLD GHB EST-MCNC: 194 MG/DL
GFR SERPL CREATININE-BSD FRML MDRD: 82 ML/MIN/1.73SQ M
GLUCOSE SERPL-MCNC: 126 MG/DL (ref 65–140)
GLUCOSE SERPL-MCNC: 129 MG/DL (ref 65–140)
GLUCOSE SERPL-MCNC: 171 MG/DL (ref 65–140)
GLUCOSE SERPL-MCNC: 174 MG/DL (ref 65–140)
HBA1C MFR BLD: 8.4 %
HCT VFR BLD AUTO: 46.2 % (ref 36.5–49.3)
HDLC SERPL-MCNC: 29 MG/DL
HGB BLD-MCNC: 14.2 G/DL (ref 12–17)
IMM GRANULOCYTES # BLD AUTO: 0.06 THOUSAND/UL (ref 0–0.2)
IMM GRANULOCYTES NFR BLD AUTO: 1 % (ref 0–2)
INR PPP: 0.97 (ref 0.84–1.19)
LACTATE SERPL-SCNC: 0.7 MMOL/L (ref 0.5–2)
LDLC SERPL CALC-MCNC: 134 MG/DL (ref 0–100)
LYMPHOCYTES # BLD AUTO: 2.04 THOUSANDS/ΜL (ref 0.6–4.47)
LYMPHOCYTES NFR BLD AUTO: 16 % (ref 14–44)
MCH RBC QN AUTO: 26.7 PG (ref 26.8–34.3)
MCHC RBC AUTO-ENTMCNC: 30.7 G/DL (ref 31.4–37.4)
MCV RBC AUTO: 87 FL (ref 82–98)
MONOCYTES # BLD AUTO: 0.77 THOUSAND/ΜL (ref 0.17–1.22)
MONOCYTES NFR BLD AUTO: 6 % (ref 4–12)
NEUTROPHILS # BLD AUTO: 9.76 THOUSANDS/ΜL (ref 1.85–7.62)
NEUTS SEG NFR BLD AUTO: 75 % (ref 43–75)
NONHDLC SERPL-MCNC: 166 MG/DL
NRBC BLD AUTO-RTO: 0 /100 WBCS
P AXIS: -14 DEGREES
PLATELET # BLD AUTO: 378 THOUSANDS/UL (ref 149–390)
PLATELET # BLD AUTO: 405 THOUSANDS/UL (ref 149–390)
PMV BLD AUTO: 9 FL (ref 8.9–12.7)
PMV BLD AUTO: 9.1 FL (ref 8.9–12.7)
POTASSIUM SERPL-SCNC: 5.7 MMOL/L (ref 3.5–5.3)
PR INTERVAL: 150 MS
PROT SERPL-MCNC: 9.1 G/DL (ref 6.4–8.2)
PROTHROMBIN TIME: 12.8 SECONDS (ref 11.6–14.5)
QRS AXIS: -9 DEGREES
QRSD INTERVAL: 90 MS
QT INTERVAL: 384 MS
QTC INTERVAL: 420 MS
RBC # BLD AUTO: 5.32 MILLION/UL (ref 3.88–5.62)
SODIUM SERPL-SCNC: 132 MMOL/L (ref 136–145)
T WAVE AXIS: 14 DEGREES
TRIGL SERPL-MCNC: 161 MG/DL
TROPONIN I SERPL-MCNC: 0.04 NG/ML
TROPONIN I SERPL-MCNC: 0.06 NG/ML
TROPONIN I SERPL-MCNC: 0.12 NG/ML
TROPONIN I SERPL-MCNC: 0.6 NG/ML
TROPONIN I SERPL-MCNC: 0.72 NG/ML
VENTRICULAR RATE: 72 BPM
WBC # BLD AUTO: 12.96 THOUSAND/UL (ref 4.31–10.16)

## 2020-11-29 PROCEDURE — 83036 HEMOGLOBIN GLYCOSYLATED A1C: CPT | Performed by: FAMILY MEDICINE

## 2020-11-29 PROCEDURE — 96361 HYDRATE IV INFUSION ADD-ON: CPT

## 2020-11-29 PROCEDURE — 93010 ELECTROCARDIOGRAM REPORT: CPT | Performed by: INTERNAL MEDICINE

## 2020-11-29 PROCEDURE — 73701 CT LOWER EXTREMITY W/DYE: CPT

## 2020-11-29 PROCEDURE — 84484 ASSAY OF TROPONIN QUANT: CPT | Performed by: FAMILY MEDICINE

## 2020-11-29 PROCEDURE — 84484 ASSAY OF TROPONIN QUANT: CPT | Performed by: EMERGENCY MEDICINE

## 2020-11-29 PROCEDURE — 85730 THROMBOPLASTIN TIME PARTIAL: CPT | Performed by: EMERGENCY MEDICINE

## 2020-11-29 PROCEDURE — 87040 BLOOD CULTURE FOR BACTERIA: CPT | Performed by: EMERGENCY MEDICINE

## 2020-11-29 PROCEDURE — 71045 X-RAY EXAM CHEST 1 VIEW: CPT

## 2020-11-29 PROCEDURE — 82948 REAGENT STRIP/BLOOD GLUCOSE: CPT

## 2020-11-29 PROCEDURE — G1004 CDSM NDSC: HCPCS

## 2020-11-29 PROCEDURE — 83605 ASSAY OF LACTIC ACID: CPT | Performed by: EMERGENCY MEDICINE

## 2020-11-29 PROCEDURE — 93005 ELECTROCARDIOGRAM TRACING: CPT

## 2020-11-29 PROCEDURE — 85610 PROTHROMBIN TIME: CPT | Performed by: EMERGENCY MEDICINE

## 2020-11-29 PROCEDURE — 74177 CT ABD & PELVIS W/CONTRAST: CPT

## 2020-11-29 PROCEDURE — 80053 COMPREHEN METABOLIC PANEL: CPT | Performed by: EMERGENCY MEDICINE

## 2020-11-29 PROCEDURE — 96365 THER/PROPH/DIAG IV INF INIT: CPT

## 2020-11-29 PROCEDURE — 80061 LIPID PANEL: CPT | Performed by: FAMILY MEDICINE

## 2020-11-29 PROCEDURE — 36415 COLL VENOUS BLD VENIPUNCTURE: CPT | Performed by: EMERGENCY MEDICINE

## 2020-11-29 PROCEDURE — 85049 AUTOMATED PLATELET COUNT: CPT | Performed by: FAMILY MEDICINE

## 2020-11-29 PROCEDURE — 99254 IP/OBS CNSLTJ NEW/EST MOD 60: CPT | Performed by: SURGERY

## 2020-11-29 PROCEDURE — NC001 PR NO CHARGE: Performed by: SURGERY

## 2020-11-29 PROCEDURE — 99285 EMERGENCY DEPT VISIT HI MDM: CPT

## 2020-11-29 PROCEDURE — 96372 THER/PROPH/DIAG INJ SC/IM: CPT

## 2020-11-29 PROCEDURE — 85025 COMPLETE CBC W/AUTO DIFF WBC: CPT | Performed by: EMERGENCY MEDICINE

## 2020-11-29 PROCEDURE — 0J993ZZ DRAINAGE OF BUTTOCK SUBCUTANEOUS TISSUE AND FASCIA, PERCUTANEOUS APPROACH: ICD-10-PCS | Performed by: SURGERY

## 2020-11-29 PROCEDURE — 99285 EMERGENCY DEPT VISIT HI MDM: CPT | Performed by: EMERGENCY MEDICINE

## 2020-11-29 PROCEDURE — 10080 I&D PILONIDAL CYST SIMPLE: CPT | Performed by: SURGERY

## 2020-11-29 RX ORDER — ENOXAPARIN SODIUM 300 MG/3ML
1 INJECTION INTRAVENOUS; SUBCUTANEOUS ONCE
Status: COMPLETED | OUTPATIENT
Start: 2020-11-29 | End: 2020-11-29

## 2020-11-29 RX ORDER — CARVEDILOL 3.12 MG/1
3.12 TABLET ORAL ONCE
Status: COMPLETED | OUTPATIENT
Start: 2020-11-29 | End: 2020-11-29

## 2020-11-29 RX ORDER — CLINDAMYCIN PHOSPHATE 600 MG/50ML
600 INJECTION INTRAVENOUS EVERY 8 HOURS
Status: DISCONTINUED | OUTPATIENT
Start: 2020-11-29 | End: 2020-12-02 | Stop reason: HOSPADM

## 2020-11-29 RX ORDER — CEFTRIAXONE 1 G/50ML
1000 INJECTION, SOLUTION INTRAVENOUS ONCE
Status: DISCONTINUED | OUTPATIENT
Start: 2020-11-29 | End: 2020-11-29

## 2020-11-29 RX ORDER — LIDOCAINE HYDROCHLORIDE 10 MG/ML
20 INJECTION, SOLUTION EPIDURAL; INFILTRATION; INTRACAUDAL; PERINEURAL ONCE
Status: DISCONTINUED | OUTPATIENT
Start: 2020-11-29 | End: 2020-12-02 | Stop reason: HOSPADM

## 2020-11-29 RX ORDER — ATORVASTATIN CALCIUM 40 MG/1
40 TABLET, FILM COATED ORAL
Status: DISCONTINUED | OUTPATIENT
Start: 2020-11-29 | End: 2020-11-30

## 2020-11-29 RX ORDER — AMLODIPINE BESYLATE 10 MG/1
10 TABLET ORAL DAILY
Status: DISCONTINUED | OUTPATIENT
Start: 2020-11-29 | End: 2020-12-02 | Stop reason: HOSPADM

## 2020-11-29 RX ORDER — ASPIRIN 81 MG/1
324 TABLET, CHEWABLE ORAL ONCE
Status: COMPLETED | OUTPATIENT
Start: 2020-11-29 | End: 2020-11-29

## 2020-11-29 RX ORDER — LISINOPRIL 5 MG/1
10 TABLET ORAL ONCE
Status: COMPLETED | OUTPATIENT
Start: 2020-11-29 | End: 2020-11-29

## 2020-11-29 RX ORDER — SODIUM CHLORIDE, SODIUM GLUCONATE, SODIUM ACETATE, POTASSIUM CHLORIDE, MAGNESIUM CHLORIDE, SODIUM PHOSPHATE, DIBASIC, AND POTASSIUM PHOSPHATE .53; .5; .37; .037; .03; .012; .00082 G/100ML; G/100ML; G/100ML; G/100ML; G/100ML; G/100ML; G/100ML
75 INJECTION, SOLUTION INTRAVENOUS CONTINUOUS
Status: DISCONTINUED | OUTPATIENT
Start: 2020-11-29 | End: 2020-11-29

## 2020-11-29 RX ORDER — ENOXAPARIN SODIUM 300 MG/3ML
1 INJECTION INTRAVENOUS; SUBCUTANEOUS EVERY 12 HOURS SCHEDULED
Status: DISCONTINUED | OUTPATIENT
Start: 2020-11-29 | End: 2020-11-30

## 2020-11-29 RX ORDER — CLINDAMYCIN PHOSPHATE 600 MG/50ML
600 INJECTION INTRAVENOUS ONCE
Status: COMPLETED | OUTPATIENT
Start: 2020-11-29 | End: 2020-11-29

## 2020-11-29 RX ORDER — ASPIRIN 81 MG/1
81 TABLET ORAL DAILY
Status: DISCONTINUED | OUTPATIENT
Start: 2020-11-30 | End: 2020-12-02 | Stop reason: HOSPADM

## 2020-11-29 RX ORDER — CARVEDILOL 3.12 MG/1
3.12 TABLET ORAL 2 TIMES DAILY WITH MEALS
Status: DISCONTINUED | OUTPATIENT
Start: 2020-11-29 | End: 2020-12-01

## 2020-11-29 RX ORDER — SODIUM CHLORIDE 9 MG/ML
125 INJECTION, SOLUTION INTRAVENOUS CONTINUOUS
Status: DISCONTINUED | OUTPATIENT
Start: 2020-11-29 | End: 2020-11-30

## 2020-11-29 RX ORDER — LISINOPRIL 10 MG/1
10 TABLET ORAL DAILY
Status: DISCONTINUED | OUTPATIENT
Start: 2020-11-29 | End: 2020-11-30

## 2020-11-29 RX ADMIN — ENOXAPARIN SODIUM 170 MG: 300 INJECTION SUBCUTANEOUS at 09:36

## 2020-11-29 RX ADMIN — CARVEDILOL 3.12 MG: 3.12 TABLET, FILM COATED ORAL at 16:46

## 2020-11-29 RX ADMIN — IODIXANOL 100 ML: 320 INJECTION, SOLUTION INTRAVASCULAR at 20:47

## 2020-11-29 RX ADMIN — ASPIRIN 81 MG CHEWABLE TABLET 324 MG: 81 TABLET CHEWABLE at 09:02

## 2020-11-29 RX ADMIN — SODIUM CHLORIDE 500 ML: 0.9 INJECTION, SOLUTION INTRAVENOUS at 06:08

## 2020-11-29 RX ADMIN — ATORVASTATIN CALCIUM 40 MG: 40 TABLET, FILM COATED ORAL at 16:46

## 2020-11-29 RX ADMIN — CARVEDILOL 3.12 MG: 3.12 TABLET, FILM COATED ORAL at 12:13

## 2020-11-29 RX ADMIN — ENOXAPARIN SODIUM 170 MG: 300 INJECTION INTRAVENOUS; SUBCUTANEOUS at 21:57

## 2020-11-29 RX ADMIN — LISINOPRIL 10 MG: 5 TABLET ORAL at 12:13

## 2020-11-29 RX ADMIN — IOHEXOL 100 ML: 350 INJECTION, SOLUTION INTRAVENOUS at 06:44

## 2020-11-29 RX ADMIN — CLINDAMYCIN PHOSPHATE 600 MG: 600 INJECTION, SOLUTION INTRAVENOUS at 10:36

## 2020-11-29 RX ADMIN — AMLODIPINE BESYLATE 10 MG: 10 TABLET ORAL at 16:46

## 2020-11-29 RX ADMIN — SODIUM CHLORIDE 125 ML/HR: 0.9 INJECTION, SOLUTION INTRAVENOUS at 16:45

## 2020-11-30 ENCOUNTER — APPOINTMENT (INPATIENT)
Dept: NON INVASIVE DIAGNOSTICS | Facility: HOSPITAL | Age: 40
DRG: 247 | End: 2020-11-30
Payer: COMMERCIAL

## 2020-11-30 LAB
ANION GAP SERPL CALCULATED.3IONS-SCNC: 2 MMOL/L (ref 4–13)
ATRIAL RATE: 65 BPM
ATRIAL RATE: 71 BPM
BASOPHILS # BLD AUTO: 0.04 THOUSANDS/ΜL (ref 0–0.1)
BASOPHILS NFR BLD AUTO: 0 % (ref 0–1)
BUN SERPL-MCNC: 13 MG/DL (ref 5–25)
CALCIUM SERPL-MCNC: 8.5 MG/DL (ref 8.3–10.1)
CHLORIDE SERPL-SCNC: 106 MMOL/L (ref 100–108)
CO2 SERPL-SCNC: 31 MMOL/L (ref 21–32)
CREAT SERPL-MCNC: 0.88 MG/DL (ref 0.6–1.3)
EOSINOPHIL # BLD AUTO: 0.24 THOUSAND/ΜL (ref 0–0.61)
EOSINOPHIL NFR BLD AUTO: 2 % (ref 0–6)
ERYTHROCYTE [DISTWIDTH] IN BLOOD BY AUTOMATED COUNT: 13.9 % (ref 11.6–15.1)
GFR SERPL CREATININE-BSD FRML MDRD: 108 ML/MIN/1.73SQ M
GLUCOSE SERPL-MCNC: 129 MG/DL (ref 65–140)
GLUCOSE SERPL-MCNC: 132 MG/DL (ref 65–140)
GLUCOSE SERPL-MCNC: 144 MG/DL (ref 65–140)
GLUCOSE SERPL-MCNC: 89 MG/DL (ref 65–140)
HCT VFR BLD AUTO: 40.5 % (ref 36.5–49.3)
HGB BLD-MCNC: 12.6 G/DL (ref 12–17)
IMM GRANULOCYTES # BLD AUTO: 0.03 THOUSAND/UL (ref 0–0.2)
IMM GRANULOCYTES NFR BLD AUTO: 0 % (ref 0–2)
LIPASE SERPL-CCNC: 394 U/L (ref 73–393)
LYMPHOCYTES # BLD AUTO: 2.2 THOUSANDS/ΜL (ref 0.6–4.47)
LYMPHOCYTES NFR BLD AUTO: 20 % (ref 14–44)
MAGNESIUM SERPL-MCNC: 1.8 MG/DL (ref 1.6–2.6)
MCH RBC QN AUTO: 26.9 PG (ref 26.8–34.3)
MCHC RBC AUTO-ENTMCNC: 31.1 G/DL (ref 31.4–37.4)
MCV RBC AUTO: 87 FL (ref 82–98)
MONOCYTES # BLD AUTO: 0.78 THOUSAND/ΜL (ref 0.17–1.22)
MONOCYTES NFR BLD AUTO: 7 % (ref 4–12)
NEUTROPHILS # BLD AUTO: 7.95 THOUSANDS/ΜL (ref 1.85–7.62)
NEUTS SEG NFR BLD AUTO: 71 % (ref 43–75)
NRBC BLD AUTO-RTO: 0 /100 WBCS
P AXIS: 24 DEGREES
P AXIS: 6 DEGREES
PLATELET # BLD AUTO: 368 THOUSANDS/UL (ref 149–390)
PMV BLD AUTO: 9.2 FL (ref 8.9–12.7)
POTASSIUM SERPL-SCNC: 4.2 MMOL/L (ref 3.5–5.3)
PR INTERVAL: 140 MS
PR INTERVAL: 158 MS
QRS AXIS: -21 DEGREES
QRS AXIS: -5 DEGREES
QRSD INTERVAL: 100 MS
QRSD INTERVAL: 86 MS
QT INTERVAL: 394 MS
QT INTERVAL: 406 MS
QTC INTERVAL: 422 MS
QTC INTERVAL: 428 MS
RBC # BLD AUTO: 4.68 MILLION/UL (ref 3.88–5.62)
SODIUM SERPL-SCNC: 139 MMOL/L (ref 136–145)
T WAVE AXIS: 31 DEGREES
T WAVE AXIS: 7 DEGREES
TROPONIN I SERPL-MCNC: 0.49 NG/ML
TROPONIN I SERPL-MCNC: 0.71 NG/ML
VENTRICULAR RATE: 65 BPM
VENTRICULAR RATE: 71 BPM
WBC # BLD AUTO: 11.24 THOUSAND/UL (ref 4.31–10.16)

## 2020-11-30 PROCEDURE — 93010 ELECTROCARDIOGRAM REPORT: CPT | Performed by: INTERNAL MEDICINE

## 2020-11-30 PROCEDURE — 85025 COMPLETE CBC W/AUTO DIFF WBC: CPT | Performed by: FAMILY MEDICINE

## 2020-11-30 PROCEDURE — 027034Z DILATION OF CORONARY ARTERY, ONE ARTERY WITH DRUG-ELUTING INTRALUMINAL DEVICE, PERCUTANEOUS APPROACH: ICD-10-PCS | Performed by: INTERNAL MEDICINE

## 2020-11-30 PROCEDURE — 82948 REAGENT STRIP/BLOOD GLUCOSE: CPT

## 2020-11-30 PROCEDURE — 99255 IP/OBS CONSLTJ NEW/EST HI 80: CPT | Performed by: INTERNAL MEDICINE

## 2020-11-30 PROCEDURE — 93005 ELECTROCARDIOGRAM TRACING: CPT

## 2020-11-30 PROCEDURE — 83690 ASSAY OF LIPASE: CPT | Performed by: FAMILY MEDICINE

## 2020-11-30 PROCEDURE — B2111ZZ FLUOROSCOPY OF MULTIPLE CORONARY ARTERIES USING LOW OSMOLAR CONTRAST: ICD-10-PCS | Performed by: INTERNAL MEDICINE

## 2020-11-30 PROCEDURE — NC001 PR NO CHARGE: Performed by: SURGERY

## 2020-11-30 PROCEDURE — 84484 ASSAY OF TROPONIN QUANT: CPT | Performed by: FAMILY MEDICINE

## 2020-11-30 PROCEDURE — 93306 TTE W/DOPPLER COMPLETE: CPT

## 2020-11-30 PROCEDURE — 83735 ASSAY OF MAGNESIUM: CPT | Performed by: FAMILY MEDICINE

## 2020-11-30 PROCEDURE — 93306 TTE W/DOPPLER COMPLETE: CPT | Performed by: INTERNAL MEDICINE

## 2020-11-30 PROCEDURE — 80048 BASIC METABOLIC PNL TOTAL CA: CPT | Performed by: FAMILY MEDICINE

## 2020-11-30 PROCEDURE — 99024 POSTOP FOLLOW-UP VISIT: CPT | Performed by: SURGERY

## 2020-11-30 RX ORDER — LISINOPRIL 10 MG/1
10 TABLET ORAL ONCE
Status: COMPLETED | OUTPATIENT
Start: 2020-11-30 | End: 2020-11-30

## 2020-11-30 RX ORDER — ACETAMINOPHEN 325 MG/1
650 TABLET ORAL EVERY 6 HOURS PRN
Status: DISCONTINUED | OUTPATIENT
Start: 2020-11-30 | End: 2020-12-02 | Stop reason: HOSPADM

## 2020-11-30 RX ORDER — ATORVASTATIN CALCIUM 80 MG/1
80 TABLET, FILM COATED ORAL
Status: DISCONTINUED | OUTPATIENT
Start: 2020-11-30 | End: 2020-12-02 | Stop reason: HOSPADM

## 2020-11-30 RX ORDER — LISINOPRIL 10 MG/1
20 TABLET ORAL DAILY
Status: DISCONTINUED | OUTPATIENT
Start: 2020-12-01 | End: 2020-12-01

## 2020-11-30 RX ORDER — SODIUM CHLORIDE 9 MG/ML
75 INJECTION, SOLUTION INTRAVENOUS CONTINUOUS
Status: DISPENSED | OUTPATIENT
Start: 2020-12-01 | End: 2020-12-01

## 2020-11-30 RX ORDER — ENOXAPARIN SODIUM 300 MG/3ML
1 INJECTION INTRAVENOUS; SUBCUTANEOUS ONCE
Status: COMPLETED | OUTPATIENT
Start: 2020-11-30 | End: 2020-11-30

## 2020-11-30 RX ADMIN — ACETAMINOPHEN 650 MG: 325 TABLET, FILM COATED ORAL at 01:12

## 2020-11-30 RX ADMIN — CARVEDILOL 3.12 MG: 3.12 TABLET, FILM COATED ORAL at 16:13

## 2020-11-30 RX ADMIN — LISINOPRIL 10 MG: 10 TABLET ORAL at 09:44

## 2020-11-30 RX ADMIN — ASPIRIN 81 MG: 81 TABLET ORAL at 09:44

## 2020-11-30 RX ADMIN — SODIUM CHLORIDE 125 ML/HR: 0.9 INJECTION, SOLUTION INTRAVENOUS at 10:24

## 2020-11-30 RX ADMIN — CLINDAMYCIN IN 5 PERCENT DEXTROSE 600 MG: 12 INJECTION, SOLUTION INTRAVENOUS at 10:20

## 2020-11-30 RX ADMIN — PERFLUTREN 0.8 ML/MIN: 6.52 INJECTION, SUSPENSION INTRAVENOUS at 09:00

## 2020-11-30 RX ADMIN — ATORVASTATIN CALCIUM 80 MG: 80 TABLET, FILM COATED ORAL at 16:13

## 2020-11-30 RX ADMIN — LISINOPRIL 10 MG: 10 TABLET ORAL at 16:13

## 2020-11-30 RX ADMIN — ENOXAPARIN SODIUM 170 MG: 300 INJECTION INTRAVENOUS; SUBCUTANEOUS at 16:22

## 2020-11-30 RX ADMIN — CLINDAMYCIN IN 5 PERCENT DEXTROSE 600 MG: 12 INJECTION, SOLUTION INTRAVENOUS at 16:21

## 2020-11-30 RX ADMIN — SODIUM CHLORIDE 125 ML/HR: 0.9 INJECTION, SOLUTION INTRAVENOUS at 00:30

## 2020-11-30 RX ADMIN — CARVEDILOL 3.12 MG: 3.12 TABLET, FILM COATED ORAL at 09:44

## 2020-11-30 RX ADMIN — CLINDAMYCIN IN 5 PERCENT DEXTROSE 600 MG: 12 INJECTION, SOLUTION INTRAVENOUS at 16:15

## 2020-11-30 RX ADMIN — AMLODIPINE BESYLATE 10 MG: 10 TABLET ORAL at 09:44

## 2020-11-30 RX ADMIN — CLINDAMYCIN IN 5 PERCENT DEXTROSE 600 MG: 12 INJECTION, SOLUTION INTRAVENOUS at 00:30

## 2020-12-01 ENCOUNTER — APPOINTMENT (OUTPATIENT)
Dept: NON INVASIVE DIAGNOSTICS | Facility: HOSPITAL | Age: 40
DRG: 247 | End: 2020-12-01
Payer: COMMERCIAL

## 2020-12-01 ENCOUNTER — APPOINTMENT (INPATIENT)
Dept: RADIOLOGY | Facility: HOSPITAL | Age: 40
DRG: 247 | End: 2020-12-01
Payer: COMMERCIAL

## 2020-12-01 LAB
ANION GAP SERPL CALCULATED.3IONS-SCNC: 3 MMOL/L (ref 4–13)
ATRIAL RATE: 65 BPM
BASOPHILS # BLD AUTO: 0.04 THOUSANDS/ΜL (ref 0–0.1)
BASOPHILS NFR BLD AUTO: 0 % (ref 0–1)
BUN SERPL-MCNC: 13 MG/DL (ref 5–25)
CALCIUM SERPL-MCNC: 8.7 MG/DL (ref 8.3–10.1)
CHLORIDE SERPL-SCNC: 103 MMOL/L (ref 100–108)
CO2 SERPL-SCNC: 30 MMOL/L (ref 21–32)
CREAT SERPL-MCNC: 1 MG/DL (ref 0.6–1.3)
EOSINOPHIL # BLD AUTO: 0.26 THOUSAND/ΜL (ref 0–0.61)
EOSINOPHIL NFR BLD AUTO: 3 % (ref 0–6)
ERYTHROCYTE [DISTWIDTH] IN BLOOD BY AUTOMATED COUNT: 13.8 % (ref 11.6–15.1)
GFR SERPL CREATININE-BSD FRML MDRD: 94 ML/MIN/1.73SQ M
GLUCOSE SERPL-MCNC: 120 MG/DL (ref 65–140)
GLUCOSE SERPL-MCNC: 125 MG/DL (ref 65–140)
GLUCOSE SERPL-MCNC: 165 MG/DL (ref 65–140)
GLUCOSE SERPL-MCNC: 172 MG/DL (ref 65–140)
GLUCOSE SERPL-MCNC: 186 MG/DL (ref 65–140)
HCT VFR BLD AUTO: 42.5 % (ref 36.5–49.3)
HGB BLD-MCNC: 13.4 G/DL (ref 12–17)
IMM GRANULOCYTES # BLD AUTO: 0.04 THOUSAND/UL (ref 0–0.2)
IMM GRANULOCYTES NFR BLD AUTO: 0 % (ref 0–2)
INR PPP: 0.98 (ref 0.84–1.19)
KCT BLD-ACNC: 166 SEC (ref 89–137)
LYMPHOCYTES # BLD AUTO: 1.45 THOUSANDS/ΜL (ref 0.6–4.47)
LYMPHOCYTES NFR BLD AUTO: 16 % (ref 14–44)
MCH RBC QN AUTO: 27 PG (ref 26.8–34.3)
MCHC RBC AUTO-ENTMCNC: 31.5 G/DL (ref 31.4–37.4)
MCV RBC AUTO: 86 FL (ref 82–98)
MONOCYTES # BLD AUTO: 0.73 THOUSAND/ΜL (ref 0.17–1.22)
MONOCYTES NFR BLD AUTO: 8 % (ref 4–12)
NEUTROPHILS # BLD AUTO: 6.63 THOUSANDS/ΜL (ref 1.85–7.62)
NEUTS SEG NFR BLD AUTO: 73 % (ref 43–75)
NRBC BLD AUTO-RTO: 0 /100 WBCS
P AXIS: 10 DEGREES
PLATELET # BLD AUTO: 369 THOUSANDS/UL (ref 149–390)
PMV BLD AUTO: 9.2 FL (ref 8.9–12.7)
POTASSIUM SERPL-SCNC: 4.1 MMOL/L (ref 3.5–5.3)
PR INTERVAL: 140 MS
PROTHROMBIN TIME: 13 SECONDS (ref 11.6–14.5)
QRS AXIS: -15 DEGREES
QRSD INTERVAL: 96 MS
QT INTERVAL: 408 MS
QTC INTERVAL: 424 MS
RBC # BLD AUTO: 4.97 MILLION/UL (ref 3.88–5.62)
SODIUM SERPL-SCNC: 136 MMOL/L (ref 136–145)
SPECIMEN SOURCE: ABNORMAL
T WAVE AXIS: -9 DEGREES
VENTRICULAR RATE: 65 BPM
WBC # BLD AUTO: 9.15 THOUSAND/UL (ref 4.31–10.16)

## 2020-12-01 PROCEDURE — G1004 CDSM NDSC: HCPCS

## 2020-12-01 PROCEDURE — 99152 MOD SED SAME PHYS/QHP 5/>YRS: CPT | Performed by: STUDENT IN AN ORGANIZED HEALTH CARE EDUCATION/TRAINING PROGRAM

## 2020-12-01 PROCEDURE — 85025 COMPLETE CBC W/AUTO DIFF WBC: CPT | Performed by: STUDENT IN AN ORGANIZED HEALTH CARE EDUCATION/TRAINING PROGRAM

## 2020-12-01 PROCEDURE — 85347 COAGULATION TIME ACTIVATED: CPT

## 2020-12-01 PROCEDURE — C1725 CATH, TRANSLUMIN NON-LASER: HCPCS | Performed by: STUDENT IN AN ORGANIZED HEALTH CARE EDUCATION/TRAINING PROGRAM

## 2020-12-01 PROCEDURE — 92928 PRQ TCAT PLMT NTRAC ST 1 LES: CPT | Performed by: INTERNAL MEDICINE

## 2020-12-01 PROCEDURE — 99153 MOD SED SAME PHYS/QHP EA: CPT | Performed by: STUDENT IN AN ORGANIZED HEALTH CARE EDUCATION/TRAINING PROGRAM

## 2020-12-01 PROCEDURE — A9585 GADOBUTROL INJECTION: HCPCS | Performed by: SURGERY

## 2020-12-01 PROCEDURE — 80048 BASIC METABOLIC PNL TOTAL CA: CPT | Performed by: STUDENT IN AN ORGANIZED HEALTH CARE EDUCATION/TRAINING PROGRAM

## 2020-12-01 PROCEDURE — 99152 MOD SED SAME PHYS/QHP 5/>YRS: CPT | Performed by: INTERNAL MEDICINE

## 2020-12-01 PROCEDURE — C1769 GUIDE WIRE: HCPCS | Performed by: STUDENT IN AN ORGANIZED HEALTH CARE EDUCATION/TRAINING PROGRAM

## 2020-12-01 PROCEDURE — C1894 INTRO/SHEATH, NON-LASER: HCPCS | Performed by: STUDENT IN AN ORGANIZED HEALTH CARE EDUCATION/TRAINING PROGRAM

## 2020-12-01 PROCEDURE — 93454 CORONARY ARTERY ANGIO S&I: CPT | Performed by: STUDENT IN AN ORGANIZED HEALTH CARE EDUCATION/TRAINING PROGRAM

## 2020-12-01 PROCEDURE — 82948 REAGENT STRIP/BLOOD GLUCOSE: CPT

## 2020-12-01 PROCEDURE — 93010 ELECTROCARDIOGRAM REPORT: CPT | Performed by: INTERNAL MEDICINE

## 2020-12-01 PROCEDURE — 73720 MRI LWR EXTREMITY W/O&W/DYE: CPT

## 2020-12-01 PROCEDURE — 99233 SBSQ HOSP IP/OBS HIGH 50: CPT | Performed by: INTERNAL MEDICINE

## 2020-12-01 PROCEDURE — 93454 CORONARY ARTERY ANGIO S&I: CPT | Performed by: INTERNAL MEDICINE

## 2020-12-01 PROCEDURE — C1887 CATHETER, GUIDING: HCPCS | Performed by: STUDENT IN AN ORGANIZED HEALTH CARE EDUCATION/TRAINING PROGRAM

## 2020-12-01 PROCEDURE — 93005 ELECTROCARDIOGRAM TRACING: CPT

## 2020-12-01 PROCEDURE — C9600 PERC DRUG-EL COR STENT SING: HCPCS | Performed by: STUDENT IN AN ORGANIZED HEALTH CARE EDUCATION/TRAINING PROGRAM

## 2020-12-01 PROCEDURE — C1874 STENT, COATED/COV W/DEL SYS: HCPCS

## 2020-12-01 PROCEDURE — 85610 PROTHROMBIN TIME: CPT | Performed by: STUDENT IN AN ORGANIZED HEALTH CARE EDUCATION/TRAINING PROGRAM

## 2020-12-01 RX ORDER — FENTANYL CITRATE 50 UG/ML
INJECTION, SOLUTION INTRAMUSCULAR; INTRAVENOUS CODE/TRAUMA/SEDATION MEDICATION
Status: COMPLETED | OUTPATIENT
Start: 2020-12-01 | End: 2020-12-01

## 2020-12-01 RX ORDER — VERAPAMIL HYDROCHLORIDE 2.5 MG/ML
INJECTION, SOLUTION INTRAVENOUS CODE/TRAUMA/SEDATION MEDICATION
Status: COMPLETED | OUTPATIENT
Start: 2020-12-01 | End: 2020-12-01

## 2020-12-01 RX ORDER — LIDOCAINE HYDROCHLORIDE 10 MG/ML
10 INJECTION, SOLUTION EPIDURAL; INFILTRATION; INTRACAUDAL; PERINEURAL ONCE
Status: DISCONTINUED | OUTPATIENT
Start: 2020-12-01 | End: 2020-12-02 | Stop reason: HOSPADM

## 2020-12-01 RX ORDER — ONDANSETRON 2 MG/ML
4 INJECTION INTRAMUSCULAR; INTRAVENOUS EVERY 6 HOURS PRN
Status: DISCONTINUED | OUTPATIENT
Start: 2020-12-01 | End: 2020-12-02 | Stop reason: HOSPADM

## 2020-12-01 RX ORDER — LIDOCAINE HYDROCHLORIDE 10 MG/ML
INJECTION, SOLUTION EPIDURAL; INFILTRATION; INTRACAUDAL; PERINEURAL CODE/TRAUMA/SEDATION MEDICATION
Status: COMPLETED | OUTPATIENT
Start: 2020-12-01 | End: 2020-12-01

## 2020-12-01 RX ORDER — NITROGLYCERIN 20 MG/100ML
INJECTION INTRAVENOUS CODE/TRAUMA/SEDATION MEDICATION
Status: COMPLETED | OUTPATIENT
Start: 2020-12-01 | End: 2020-12-01

## 2020-12-01 RX ORDER — SODIUM CHLORIDE 9 MG/ML
INJECTION, SOLUTION INTRAVENOUS
Status: COMPLETED | OUTPATIENT
Start: 2020-12-01 | End: 2020-12-01

## 2020-12-01 RX ORDER — HYDROXYZINE HYDROCHLORIDE 10 MG/1
5 TABLET, FILM COATED ORAL ONCE
Status: DISCONTINUED | OUTPATIENT
Start: 2020-12-01 | End: 2020-12-02 | Stop reason: HOSPADM

## 2020-12-01 RX ORDER — HEPARIN SODIUM 1000 [USP'U]/ML
INJECTION, SOLUTION INTRAVENOUS; SUBCUTANEOUS CODE/TRAUMA/SEDATION MEDICATION
Status: COMPLETED | OUTPATIENT
Start: 2020-12-01 | End: 2020-12-01

## 2020-12-01 RX ORDER — LISINOPRIL 10 MG/1
20 TABLET ORAL DAILY
Status: DISCONTINUED | OUTPATIENT
Start: 2020-12-02 | End: 2020-12-02 | Stop reason: HOSPADM

## 2020-12-01 RX ORDER — SODIUM CHLORIDE 9 MG/ML
75 INJECTION, SOLUTION INTRAVENOUS CONTINUOUS
Status: DISPENSED | OUTPATIENT
Start: 2020-12-01 | End: 2020-12-01

## 2020-12-01 RX ORDER — OXYCODONE HYDROCHLORIDE AND ACETAMINOPHEN 5; 325 MG/1; MG/1
1 TABLET ORAL EVERY 8 HOURS PRN
Status: DISCONTINUED | OUTPATIENT
Start: 2020-12-01 | End: 2020-12-02 | Stop reason: HOSPADM

## 2020-12-01 RX ORDER — CARVEDILOL 6.25 MG/1
6.25 TABLET ORAL 2 TIMES DAILY WITH MEALS
Status: DISCONTINUED | OUTPATIENT
Start: 2020-12-01 | End: 2020-12-02

## 2020-12-01 RX ORDER — MIDAZOLAM HYDROCHLORIDE 2 MG/2ML
INJECTION, SOLUTION INTRAMUSCULAR; INTRAVENOUS CODE/TRAUMA/SEDATION MEDICATION
Status: COMPLETED | OUTPATIENT
Start: 2020-12-01 | End: 2020-12-01

## 2020-12-01 RX ADMIN — MIDAZOLAM 1 MG: 1 INJECTION INTRAMUSCULAR; INTRAVENOUS at 11:38

## 2020-12-01 RX ADMIN — ACETAMINOPHEN 650 MG: 325 TABLET, FILM COATED ORAL at 19:44

## 2020-12-01 RX ADMIN — HEPARIN SODIUM 2000 UNITS: 1000 INJECTION INTRAVENOUS; SUBCUTANEOUS at 11:49

## 2020-12-01 RX ADMIN — OXYCODONE HYDROCHLORIDE AND ACETAMINOPHEN 1 TABLET: 5; 325 TABLET ORAL at 15:19

## 2020-12-01 RX ADMIN — FENTANYL CITRATE 25 MCG: 50 INJECTION INTRAMUSCULAR; INTRAVENOUS at 11:38

## 2020-12-01 RX ADMIN — TICAGRELOR 180 MG: 90 TABLET ORAL at 11:29

## 2020-12-01 RX ADMIN — CLINDAMYCIN IN 5 PERCENT DEXTROSE 600 MG: 12 INJECTION, SOLUTION INTRAVENOUS at 00:48

## 2020-12-01 RX ADMIN — SODIUM CHLORIDE 100 ML/HR: 0.9 INJECTION, SOLUTION INTRAVENOUS at 10:58

## 2020-12-01 RX ADMIN — FENTANYL CITRATE 25 MCG: 50 INJECTION INTRAMUSCULAR; INTRAVENOUS at 11:41

## 2020-12-01 RX ADMIN — LIDOCAINE HYDROCHLORIDE 1 ML: 10 INJECTION, SOLUTION EPIDURAL; INFILTRATION; INTRACAUDAL; PERINEURAL at 11:09

## 2020-12-01 RX ADMIN — ACETAMINOPHEN 650 MG: 325 TABLET, FILM COATED ORAL at 12:19

## 2020-12-01 RX ADMIN — Medication 200 MCG: at 11:12

## 2020-12-01 RX ADMIN — OXYCODONE HYDROCHLORIDE AND ACETAMINOPHEN 1 TABLET: 5; 325 TABLET ORAL at 23:19

## 2020-12-01 RX ADMIN — SODIUM CHLORIDE 75 ML/HR: 0.9 INJECTION, SOLUTION INTRAVENOUS at 12:11

## 2020-12-01 RX ADMIN — TICAGRELOR 90 MG: 90 TABLET ORAL at 22:14

## 2020-12-01 RX ADMIN — HEPARIN SODIUM 4000 UNITS: 1000 INJECTION INTRAVENOUS; SUBCUTANEOUS at 11:13

## 2020-12-01 RX ADMIN — GADOBUTROL 17 ML: 604.72 INJECTION INTRAVENOUS at 05:07

## 2020-12-01 RX ADMIN — FENTANYL CITRATE 50 MCG: 50 INJECTION INTRAMUSCULAR; INTRAVENOUS at 11:02

## 2020-12-01 RX ADMIN — FENTANYL CITRATE 50 MCG: 50 INJECTION INTRAMUSCULAR; INTRAVENOUS at 11:13

## 2020-12-01 RX ADMIN — CLINDAMYCIN IN 5 PERCENT DEXTROSE 600 MG: 12 INJECTION, SOLUTION INTRAVENOUS at 08:15

## 2020-12-01 RX ADMIN — HEPARIN SODIUM 8000 UNITS: 1000 INJECTION INTRAVENOUS; SUBCUTANEOUS at 11:29

## 2020-12-01 RX ADMIN — MIDAZOLAM 2 MG: 1 INJECTION INTRAMUSCULAR; INTRAVENOUS at 11:02

## 2020-12-01 RX ADMIN — MIDAZOLAM 1 MG: 1 INJECTION INTRAMUSCULAR; INTRAVENOUS at 11:15

## 2020-12-01 RX ADMIN — CARVEDILOL 3.12 MG: 3.12 TABLET, FILM COATED ORAL at 06:36

## 2020-12-01 RX ADMIN — INSULIN LISPRO 2 UNITS: 100 INJECTION, SOLUTION INTRAVENOUS; SUBCUTANEOUS at 06:34

## 2020-12-01 RX ADMIN — CLINDAMYCIN IN 5 PERCENT DEXTROSE 600 MG: 12 INJECTION, SOLUTION INTRAVENOUS at 16:16

## 2020-12-01 RX ADMIN — ASPIRIN 81 MG: 81 TABLET ORAL at 08:11

## 2020-12-01 RX ADMIN — ATORVASTATIN CALCIUM 80 MG: 80 TABLET, FILM COATED ORAL at 16:16

## 2020-12-01 RX ADMIN — SODIUM CHLORIDE 75 ML/HR: 0.9 INJECTION, SOLUTION INTRAVENOUS at 00:49

## 2020-12-01 RX ADMIN — AMLODIPINE BESYLATE 10 MG: 10 TABLET ORAL at 08:11

## 2020-12-01 RX ADMIN — VERAPAMIL HYDROCHLORIDE 2.5 MG: 2.5 INJECTION INTRAVENOUS at 11:13

## 2020-12-01 RX ADMIN — IOHEXOL 130 ML: 350 INJECTION, SOLUTION INTRAVENOUS at 11:46

## 2020-12-01 RX ADMIN — CARVEDILOL 6.25 MG: 6.25 TABLET, FILM COATED ORAL at 16:16

## 2020-12-02 VITALS
RESPIRATION RATE: 18 BRPM | DIASTOLIC BLOOD PRESSURE: 74 MMHG | OXYGEN SATURATION: 92 % | HEART RATE: 58 BPM | BODY MASS INDEX: 46.65 KG/M2 | SYSTOLIC BLOOD PRESSURE: 138 MMHG | TEMPERATURE: 97.9 F | WEIGHT: 315 LBS | HEIGHT: 69 IN

## 2020-12-02 PROBLEM — I21.4 TYPE 1 NON-ST ELEVATION MYOCARDIAL INFARCTION (NSTEMI) (HCC): Status: ACTIVE | Noted: 2020-11-29

## 2020-12-02 LAB
ANION GAP SERPL CALCULATED.3IONS-SCNC: 4 MMOL/L (ref 4–13)
BUN SERPL-MCNC: 12 MG/DL (ref 5–25)
CALCIUM SERPL-MCNC: 8.9 MG/DL (ref 8.3–10.1)
CHLORIDE SERPL-SCNC: 104 MMOL/L (ref 100–108)
CO2 SERPL-SCNC: 28 MMOL/L (ref 21–32)
CREAT SERPL-MCNC: 0.9 MG/DL (ref 0.6–1.3)
ERYTHROCYTE [DISTWIDTH] IN BLOOD BY AUTOMATED COUNT: 13.8 % (ref 11.6–15.1)
GFR SERPL CREATININE-BSD FRML MDRD: 107 ML/MIN/1.73SQ M
GLUCOSE SERPL-MCNC: 154 MG/DL (ref 65–140)
GLUCOSE SERPL-MCNC: 154 MG/DL (ref 65–140)
GLUCOSE SERPL-MCNC: 212 MG/DL (ref 65–140)
HCT VFR BLD AUTO: 41.4 % (ref 36.5–49.3)
HGB BLD-MCNC: 12.7 G/DL (ref 12–17)
MCH RBC QN AUTO: 26.5 PG (ref 26.8–34.3)
MCHC RBC AUTO-ENTMCNC: 30.7 G/DL (ref 31.4–37.4)
MCV RBC AUTO: 86 FL (ref 82–98)
PLATELET # BLD AUTO: 352 THOUSANDS/UL (ref 149–390)
PMV BLD AUTO: 9.4 FL (ref 8.9–12.7)
POTASSIUM SERPL-SCNC: 4 MMOL/L (ref 3.5–5.3)
RBC # BLD AUTO: 4.8 MILLION/UL (ref 3.88–5.62)
SODIUM SERPL-SCNC: 136 MMOL/L (ref 136–145)
WBC # BLD AUTO: 9.61 THOUSAND/UL (ref 4.31–10.16)

## 2020-12-02 PROCEDURE — 85027 COMPLETE CBC AUTOMATED: CPT | Performed by: STUDENT IN AN ORGANIZED HEALTH CARE EDUCATION/TRAINING PROGRAM

## 2020-12-02 PROCEDURE — 99232 SBSQ HOSP IP/OBS MODERATE 35: CPT | Performed by: INTERNAL MEDICINE

## 2020-12-02 PROCEDURE — 80048 BASIC METABOLIC PNL TOTAL CA: CPT | Performed by: STUDENT IN AN ORGANIZED HEALTH CARE EDUCATION/TRAINING PROGRAM

## 2020-12-02 PROCEDURE — 82948 REAGENT STRIP/BLOOD GLUCOSE: CPT

## 2020-12-02 RX ORDER — CARVEDILOL 3.12 MG/1
3.12 TABLET ORAL 2 TIMES DAILY WITH MEALS
Status: DISCONTINUED | OUTPATIENT
Start: 2020-12-02 | End: 2020-12-02 | Stop reason: HOSPADM

## 2020-12-02 RX ORDER — CLINDAMYCIN HYDROCHLORIDE 300 MG/1
300 CAPSULE ORAL EVERY 8 HOURS SCHEDULED
Qty: 6 CAPSULE | Refills: 0 | Status: SHIPPED | OUTPATIENT
Start: 2020-12-02 | End: 2020-12-04

## 2020-12-02 RX ORDER — ASPIRIN 81 MG/1
81 TABLET ORAL DAILY
Qty: 30 TABLET | Refills: 0 | Status: SHIPPED | OUTPATIENT
Start: 2020-12-03

## 2020-12-02 RX ADMIN — LISINOPRIL 20 MG: 10 TABLET ORAL at 10:15

## 2020-12-02 RX ADMIN — AMLODIPINE BESYLATE 10 MG: 10 TABLET ORAL at 10:19

## 2020-12-02 RX ADMIN — INSULIN LISPRO 4 UNITS: 100 INJECTION, SOLUTION INTRAVENOUS; SUBCUTANEOUS at 11:18

## 2020-12-02 RX ADMIN — INSULIN LISPRO 2 UNITS: 100 INJECTION, SOLUTION INTRAVENOUS; SUBCUTANEOUS at 06:43

## 2020-12-02 RX ADMIN — CLINDAMYCIN IN 5 PERCENT DEXTROSE 600 MG: 12 INJECTION, SOLUTION INTRAVENOUS at 00:41

## 2020-12-02 RX ADMIN — TICAGRELOR 90 MG: 90 TABLET ORAL at 14:24

## 2020-12-02 RX ADMIN — ASPIRIN 81 MG: 81 TABLET ORAL at 10:15

## 2020-12-02 RX ADMIN — CLINDAMYCIN IN 5 PERCENT DEXTROSE 600 MG: 12 INJECTION, SOLUTION INTRAVENOUS at 10:41

## 2020-12-04 LAB
ATRIAL RATE: 326 BPM
ATRIAL RATE: 72 BPM
ATRIAL RATE: 79 BPM
BACTERIA BLD CULT: NORMAL
BACTERIA BLD CULT: NORMAL
P AXIS: 23 DEGREES
P AXIS: 6 DEGREES
PR INTERVAL: 136 MS
PR INTERVAL: 148 MS
QRS AXIS: -18 DEGREES
QRS AXIS: -23 DEGREES
QRS AXIS: -31 DEGREES
QRSD INTERVAL: 84 MS
QRSD INTERVAL: 86 MS
QRSD INTERVAL: 88 MS
QT INTERVAL: 378 MS
QT INTERVAL: 380 MS
QT INTERVAL: 390 MS
QTC INTERVAL: 427 MS
QTC INTERVAL: 433 MS
QTC INTERVAL: 433 MS
T WAVE AXIS: 21 DEGREES
T WAVE AXIS: 24 DEGREES
T WAVE AXIS: 30 DEGREES
VENTRICULAR RATE: 72 BPM
VENTRICULAR RATE: 78 BPM
VENTRICULAR RATE: 79 BPM

## 2020-12-04 PROCEDURE — 93010 ELECTROCARDIOGRAM REPORT: CPT | Performed by: INTERNAL MEDICINE

## 2020-12-09 ENCOUNTER — CONSULT (OUTPATIENT)
Dept: CARDIOLOGY CLINIC | Facility: CLINIC | Age: 40
End: 2020-12-09
Payer: COMMERCIAL

## 2020-12-09 VITALS
SYSTOLIC BLOOD PRESSURE: 134 MMHG | HEART RATE: 72 BPM | HEIGHT: 69 IN | OXYGEN SATURATION: 96 % | WEIGHT: 315 LBS | DIASTOLIC BLOOD PRESSURE: 74 MMHG | BODY MASS INDEX: 46.65 KG/M2 | TEMPERATURE: 98.8 F | RESPIRATION RATE: 18 BRPM

## 2020-12-09 DIAGNOSIS — E78.5 DYSLIPIDEMIA: Primary | ICD-10-CM

## 2020-12-09 PROCEDURE — 3078F DIAST BP <80 MM HG: CPT | Performed by: INTERNAL MEDICINE

## 2020-12-09 PROCEDURE — 3075F SYST BP GE 130 - 139MM HG: CPT | Performed by: INTERNAL MEDICINE

## 2020-12-09 PROCEDURE — 3008F BODY MASS INDEX DOCD: CPT | Performed by: INTERNAL MEDICINE

## 2020-12-09 PROCEDURE — 99203 OFFICE O/P NEW LOW 30 MIN: CPT | Performed by: INTERNAL MEDICINE

## 2020-12-09 RX ORDER — ATORVASTATIN CALCIUM 40 MG/1
40 TABLET, FILM COATED ORAL DAILY
Qty: 90 TABLET | Refills: 2 | Status: SHIPPED | OUTPATIENT
Start: 2020-12-09 | End: 2021-03-15

## 2020-12-09 RX ORDER — CHLORAL HYDRATE 500 MG
1000 CAPSULE ORAL 2 TIMES DAILY
Qty: 180 CAPSULE | Refills: 2 | Status: SHIPPED | OUTPATIENT
Start: 2020-12-09

## 2020-12-17 DIAGNOSIS — IMO0002 DM TYPE 2, UNCONTROLLED, WITH NEUROPATHY: ICD-10-CM

## 2020-12-17 DIAGNOSIS — I10 ESSENTIAL HYPERTENSION: ICD-10-CM

## 2020-12-20 RX ORDER — GLIMEPIRIDE 4 MG/1
TABLET ORAL
Qty: 30 TABLET | Refills: 5 | Status: SHIPPED | OUTPATIENT
Start: 2020-12-20 | End: 2020-12-22

## 2020-12-20 RX ORDER — AMLODIPINE BESYLATE 10 MG/1
TABLET ORAL
Qty: 90 TABLET | Refills: 0 | Status: SHIPPED | OUTPATIENT
Start: 2020-12-20 | End: 2021-05-12 | Stop reason: SDUPTHER

## 2020-12-21 DIAGNOSIS — IMO0002 DM TYPE 2, UNCONTROLLED, WITH NEUROPATHY: ICD-10-CM

## 2020-12-22 RX ORDER — GLIMEPIRIDE 4 MG/1
TABLET ORAL
Qty: 30 TABLET | Refills: 5 | Status: SHIPPED | OUTPATIENT
Start: 2020-12-22 | End: 2021-03-02 | Stop reason: SDUPTHER

## 2020-12-23 DIAGNOSIS — I10 ESSENTIAL HYPERTENSION: ICD-10-CM

## 2020-12-23 PROCEDURE — 4010F ACE/ARB THERAPY RXD/TAKEN: CPT | Performed by: INTERNAL MEDICINE

## 2020-12-23 RX ORDER — LISINOPRIL 10 MG/1
10 TABLET ORAL DAILY
Qty: 30 TABLET | Refills: 0 | Status: SHIPPED | OUTPATIENT
Start: 2020-12-23 | End: 2021-01-26 | Stop reason: SDUPTHER

## 2021-01-11 ENCOUNTER — CONSULT (OUTPATIENT)
Dept: SURGERY | Facility: CLINIC | Age: 41
End: 2021-01-11
Payer: COMMERCIAL

## 2021-01-11 VITALS
SYSTOLIC BLOOD PRESSURE: 132 MMHG | HEIGHT: 69 IN | BODY MASS INDEX: 46.65 KG/M2 | WEIGHT: 315 LBS | HEART RATE: 73 BPM | TEMPERATURE: 96.3 F | DIASTOLIC BLOOD PRESSURE: 70 MMHG

## 2021-01-11 DIAGNOSIS — L05.91 PILONIDAL CYST: ICD-10-CM

## 2021-01-11 DIAGNOSIS — R19.09 LEFT GROIN MASS: Primary | ICD-10-CM

## 2021-01-11 PROCEDURE — 99202 OFFICE O/P NEW SF 15 MIN: CPT | Performed by: SURGERY

## 2021-01-11 NOTE — PROGRESS NOTES
[facility] History and Physical Note:    Assessment:   1  Pilonidal disease  2  Hidradenitis bilateral groins with lymphedema and left inner thigh mass    Plan:   patient was seen by the acute care surgery team at [facility] during his hospitalization for his myocardial infarction  At that time, a plan was developed for treatment of his pilonidal disease with surgery, a core needle biopsy of his left thigh mass, and then follow-up for possible large excision or staged procedure of his thigh mass  This plan is best supported at [facility] and not [facility]       I will facilitate follow-up at [facility] I will supplies previously planned    Chief Complaint:   "I am here for this large mass as well as the drainage from my backside"    HPI   patient is a 23-year-old male with multiple medical issues Including coronary artery disease, diabetes, morbid obesity,  And recent myocardial infarction with hospitalization last month  He reports to my office today for continued treatment of his pilonidal disease and large left proximal thigh mass and hidradenitis  He was seen by the acute care surgeons at the time of his recent admission at [facility] and a plan was developed  Please see plan outlined above        PMH:  Past Medical History:   Diagnosis Date    Coronary artery disease     Diabetes mellitus [facility]     Heart disease     CAD   s/p ptca with 1 stent 2012    Hyperlipidemia     Hypertension     MI (myocardial infarction) ([facility] )     " silent " M I  in the past    Myocardial infarction [facility]     Obesity     Pilonidal cyst        PSH:  Past Surgical History:   Procedure Laterality Date    CORONARY ANGIOPLASTY WITH STENT PLACEMENT      INCISION AND DRAINAGE OF WOUND N/A 1/24/2017    Procedure: INCISION AND DRAINAGE (I&D) BUTTOCK, PILONIDAL CYST;  Surgeon: Joan Chong MD;  Location: [address] [place] [address];  Service:    [facility] LEG SURGERY Left     I&D of left leg 2012       Home Meds:  Current Outpatient Medications on File Prior to Visit   Medication Sig Dispense Refill    amLODIPine (NORVASC) 10 mg tablet take 1 tablet by mouth once daily 90 tablet 0    aspirin (ECOTRIN LOW STRENGTH) 81 mg EC tablet Take 1 tablet (81 mg total) by mouth daily 30 tablet 0    atorvastatin (LIPITOR) 40 mg tablet Take 1 tablet (40 mg total) by mouth daily 90 tablet 2    carvedilol (COREG) 3 125 mg tablet take 1 tablet by mouth twice a day with meals 30 tablet 5    glimepiride (AMARYL) 4 mg tablet take 1 tablet once daily WITH BREAKFAST 30 tablet 5    lisinopril (ZESTRIL) 10 mg tablet Take 1 tablet (10 mg total) by mouth daily 30 tablet 0    metFORMIN (GLUCOPHAGE) 1000 MG tablet Take 1 tablet (1,000 mg total) by mouth 2 (two) times a day with meals 60 tablet 5    Omega-3 Fatty Acids (fish oil) 1,000 mg Take 1 capsule (1,000 mg total) by mouth 2 (two) times a day 180 capsule 2    ticagrelor (BRILINTA) 90 MG Take 1 tablet (90 mg total) by mouth every 12 (twelve) hours 60 tablet 3     No current facility-administered medications on file prior to visit  Allergies:   Allergies   Allergen Reactions    Amoxicillin        Social Hx:  Social History     Socioeconomic History    Marital status: Single     Spouse name: Not on file    Number of children: Not on file    Years of education: Not on file    Highest education level: Not on file   Occupational History    Not on file   Social Needs    Financial resource strain: Not on file    Food insecurity     Worry: Not on file     Inability: Not on file    Transportation needs     Medical: Not on file     Non-medical: Not on file   Tobacco Use    Smoking status: Current Every Day Smoker     Packs/day: 1 00     Years: 20 00     Pack years: 20 00     Types: Cigarettes    Smokeless tobacco: Never Used   Substance and Sexual Activity    Alcohol use: Yes     Comment: rarely    Drug use: No    Sexual activity: Not on file   Lifestyle    Physical activity     Days per week: Not on file     Minutes per session: Not on file    Stress: Not on file   Relationships    Social connections     Talks on phone: Not on file     Gets together: Not on file     Attends [employer] service: Not on file     Active member of club or organization: Not on file     Attends meetings of clubs or organizations: Not on file     Relationship status: Not on file    Intimate partner violence     Fear of current or ex partner: Not on file     Emotionally abused: Not on file     Physically abused: Not on file     Forced sexual activity: Not on file   Other Topics Concern    Not on file   Social History Narrative    Not on file        Family Hx:    Family History   Problem Relation Age of Onset    Heart disease Father          Review of Systems   Constitutional: Negative for chills and fever  /70 (BP Location: Left arm, Patient Position: Sitting, Cuff Size: Large)   Pulse 73   Temp (!) 96 3 °F (35 7 °C) (Core)   Ht 5' 9" (1 753 m)   Wt (!) 172 kg (379 lb 9 6 oz)   BMI 56 06 kg/m²     Physical Exam  Constitutional:       General: He is not in acute distress  Appearance: He is obese  Cardiovascular:      Rate and Rhythm: Normal rate and regular rhythm  Pulses: Normal pulses  Pulmonary:      Effort: Pulmonary effort is normal    Musculoskeletal: Normal range of motion  Neurological:      General: No focal deficit present  Mental Status: He is oriented to person, place, and time  Cranial Nerves: No cranial nerve deficit  Psychiatric:         Mood and Affect: Mood normal          Behavior: Behavior normal       patient has extensive pilonidal disease with draining sinuses  Patient also has hidradenitis of bilateral groins  Patient has a large mass proximal medial thigh previously evaluated at [facility] by CT scan and MRI      Photograph was taken today in the office and uploaded to [employer]    Pertinent labs reviewed    Pertinent images and available reads personally reviewed   I personally reviewed CT scan images and well as the report      I personally reviewed MRI images and the MRI reports  Pertinent notes reviewed   I personally reviewed the consultation from the acute care surgery service at [address], [place] [address] [facility]  [phone]

## 2021-01-12 ENCOUNTER — TELEPHONE (OUTPATIENT)
Dept: SURGERY | Facility: CLINIC | Age: 41
End: 2021-01-12

## 2021-01-12 NOTE — TELEPHONE ENCOUNTER
Pt is s/p I/p myocardial infarction and groin mass and pilonidal cyst - ref for fol up to General Surgery  Pt lives in [address] [place] [address]; appt was sched with Dr Victorino Lewis for fol-up to accommodate pt  Pt was seen by Dr Victorino Lewis yesterday; he is deferring fol-up to [facility]; specifically, Dr Ken Robertson  Pt needs to be seen this week; pt is requesting a 5 pm appt on Thurs with Dr Ken Robertson  Sending this msg to Dr ZHU for advisement  mb    Dr ZHU:  Can you see him, Thurs at 5:00?   Ty

## 2021-01-14 ENCOUNTER — OFFICE VISIT (OUTPATIENT)
Dept: SURGERY | Facility: CLINIC | Age: 41
End: 2021-01-14
Payer: COMMERCIAL

## 2021-01-14 VITALS
SYSTOLIC BLOOD PRESSURE: 138 MMHG | TEMPERATURE: 97.6 F | BODY MASS INDEX: 46.65 KG/M2 | DIASTOLIC BLOOD PRESSURE: 88 MMHG | WEIGHT: 315 LBS | HEIGHT: 69 IN | HEART RATE: 82 BPM

## 2021-01-14 DIAGNOSIS — L05.91 PILONIDAL CYST: Primary | ICD-10-CM

## 2021-01-14 PROCEDURE — 99203 OFFICE O/P NEW LOW 30 MIN: CPT | Performed by: SURGERY

## 2021-01-14 NOTE — PROGRESS NOTES
Office Visit - General Surgery  Lianna Rangel MRN: [id number]  Encounter: [id number]    Assessment and Plan    Problem List Items Addressed This Visit     None      40M w pilonidal cyst and L groin mass  - pilonidal area spontaneously draining without evidence of active infection  D/w pt sign and symptoms to watch for if urgent intervention needed  - also d/w pt L groin mass and possible excision of mass, however as pt had recent MI w coronary stent placement the cardiac risks of elective surgery outweigh the benefits at this time    - Pt  Will f/u in office in x3 months to discuss surgical options  Chief Complaint:  Lianna Rangel is a 45 y o  male who presents for Mass (Consult Groin mass and pilonidal cyst  Drainage from pilonidal cyst denies pain  Groin mass painful is a 7/10 )    Subjective  40M w pilonidal cyst and L groin mass and b/l groin hydradenitis  Pt recently seen in [facility] for I&D of pilonidal abscess  Wound has healed and is spontaneously draining without evidence of active infection  No f/c, no n/v  katie PO diet  Pt w recent admission for MI and coronary stent placement      Past Medical History  Past Medical History:   Diagnosis Date    Coronary artery disease     Diabetes mellitus [facility]     Heart disease     CAD   s/p ptca with 1 stent 2012    Hyperlipidemia     Hypertension     MI (myocardial infarction) ([facility] )     " silent " M I  in the past    Myocardial infarction ([facility]     Obesity     Pilonidal cyst        Past Surgical History  Past Surgical History:   Procedure Laterality Date    CORONARY ANGIOPLASTY WITH STENT PLACEMENT      INCISION AND DRAINAGE OF WOUND N/A 1/24/2017    Procedure: INCISION AND DRAINAGE (I&D) BUTTOCK, PILONIDAL CYST;  Surgeon: Rodriguez Osborn MD;  Location: [facility];  Service:    [facility] LEG SURGERY Left     I&D of left leg 2012       Family History  Family History   Problem Relation Age of Onset    Heart disease Father        Social History  Social History Socioeconomic History    Marital status: Single     Spouse name: None    Number of children: None    Years of education: None    Highest education level: None   Occupational History    None   Social Needs    Financial resource strain: None    Food insecurity     Worry: None     Inability: None    Transportation needs     Medical: None     Non-medical: None   Tobacco Use    Smoking status: Current Every Day Smoker     Packs/day: 1 00     Years: 20 00     Pack years: 20 00     Types: Cigarettes    Smokeless tobacco: Never Used   Substance and Sexual Activity    Alcohol use: Yes     Comment: rarely    Drug use: No    Sexual activity: None   Lifestyle    Physical activity     Days per week: None     Minutes per session: None    Stress: None   Relationships    Social connections     Talks on phone: None     Gets together: None     Attends Yazdanism service: None     Active member of club or organization: None     Attends meetings of clubs or organizations: None     Relationship status: None    Intimate partner violence     Fear of current or ex partner: None     Emotionally abused: None     Physically abused: None     Forced sexual activity: None   Other Topics Concern    None   Social History Narrative    None        Medications  Current Outpatient Medications on File Prior to Visit   Medication Sig Dispense Refill    amLODIPine (NORVASC) 10 mg tablet take 1 tablet by mouth once daily 90 tablet 0    aspirin (ECOTRIN LOW STRENGTH) 81 mg EC tablet Take 1 tablet (81 mg total) by mouth daily 30 tablet 0    atorvastatin (LIPITOR) 40 mg tablet Take 1 tablet (40 mg total) by mouth daily 90 tablet 2    carvedilol (COREG) 3 125 mg tablet take 1 tablet by mouth twice a day with meals 30 tablet 5    glimepiride (AMARYL) 4 mg tablet take 1 tablet once daily WITH BREAKFAST 30 tablet 5    lisinopril (ZESTRIL) 10 mg tablet Take 1 tablet (10 mg total) by mouth daily 30 tablet 0    metFORMIN (GLUCOPHAGE) 1000 MG tablet Take 1 tablet (1,000 mg total) by mouth 2 (two) times a day with meals 60 tablet 5    Omega-3 Fatty Acids (fish oil) 1,000 mg Take 1 capsule (1,000 mg total) by mouth 2 (two) times a day 180 capsule 2    ticagrelor (BRILINTA) 90 MG Take 1 tablet (90 mg total) by mouth every 12 (twelve) hours 60 tablet 3     No current facility-administered medications on file prior to visit  Allergies  Allergies   Allergen Reactions    Amoxicillin        Review of Systems   All other systems reviewed and are negative  Objective  Vitals:    01/14/21 0932   BP: 138/88   Pulse: 82   Temp: 97 6 °F (36 4 °C)       Physical Exam  Constitutional:       Appearance: Normal appearance  HENT:      Head: Atraumatic  Eyes:      Extraocular Movements: Extraocular movements intact  Neck:      Musculoskeletal: Neck supple  Cardiovascular:      Rate and Rhythm: Normal rate  Pulmonary:      Effort: Pulmonary effort is normal    Abdominal:      General: There is no distension  Palpations: Abdomen is soft  Tenderness: There is no abdominal tenderness  Skin:     General: Skin is warm and dry  Comments: Large L groin mass/without evidence of cellulitis or infection  B/l groin hydradenitis noted  mulitple pilonidal sinus tracts with spontaneous drainage, no erythema   Neurological:      General: No focal deficit present  Mental Status: He is alert and oriented to person, place, and time

## 2021-01-26 ENCOUNTER — OFFICE VISIT (OUTPATIENT)
Dept: FAMILY MEDICINE CLINIC | Facility: CLINIC | Age: 41
End: 2021-01-26
Payer: COMMERCIAL

## 2021-01-26 VITALS
HEIGHT: 69 IN | RESPIRATION RATE: 18 BRPM | OXYGEN SATURATION: 93 % | SYSTOLIC BLOOD PRESSURE: 132 MMHG | TEMPERATURE: 97.4 F | HEART RATE: 85 BPM | WEIGHT: 315 LBS | BODY MASS INDEX: 46.65 KG/M2 | DIASTOLIC BLOOD PRESSURE: 90 MMHG

## 2021-01-26 DIAGNOSIS — I25.10 CAD (CORONARY ARTERY DISEASE): ICD-10-CM

## 2021-01-26 DIAGNOSIS — E78.5 DYSLIPIDEMIA: ICD-10-CM

## 2021-01-26 DIAGNOSIS — E66.01 MORBID OBESITY (HCC): ICD-10-CM

## 2021-01-26 DIAGNOSIS — F17.200 TOBACCO DEPENDENCE: ICD-10-CM

## 2021-01-26 DIAGNOSIS — I10 ESSENTIAL HYPERTENSION: ICD-10-CM

## 2021-01-26 DIAGNOSIS — IMO0002 DM TYPE 2, UNCONTROLLED, WITH NEUROPATHY: Primary | ICD-10-CM

## 2021-01-26 PROCEDURE — 4010F ACE/ARB THERAPY RXD/TAKEN: CPT | Performed by: FAMILY MEDICINE

## 2021-01-26 PROCEDURE — 3075F SYST BP GE 130 - 139MM HG: CPT | Performed by: FAMILY MEDICINE

## 2021-01-26 PROCEDURE — 99213 OFFICE O/P EST LOW 20 MIN: CPT | Performed by: FAMILY MEDICINE

## 2021-01-26 PROCEDURE — 3008F BODY MASS INDEX DOCD: CPT | Performed by: FAMILY MEDICINE

## 2021-01-26 PROCEDURE — 3080F DIAST BP >= 90 MM HG: CPT | Performed by: FAMILY MEDICINE

## 2021-01-26 RX ORDER — CARVEDILOL 3.12 MG/1
3.12 TABLET ORAL 2 TIMES DAILY WITH MEALS
Qty: 180 TABLET | Refills: 1 | Status: SHIPPED | OUTPATIENT
Start: 2021-01-26 | End: 2021-03-02 | Stop reason: SDUPTHER

## 2021-01-26 RX ORDER — LISINOPRIL 10 MG/1
10 TABLET ORAL DAILY
Qty: 90 TABLET | Refills: 1 | Status: SHIPPED | OUTPATIENT
Start: 2021-01-26 | End: 2021-03-02 | Stop reason: SDUPTHER

## 2021-01-26 NOTE — PROGRESS NOTES
[id number] Note  Helga Gaspar, [place], 21     Pepe Tello MRN: [id number] : 1980 Age: 45 y o  Assessment/Plan        1  DM type 2, uncontrolled, with neuropathy (HCC)  metFORMIN (GLUCOPHAGE) 1000 MG tablet   2  Essential hypertension  lisinopril (ZESTRIL) 10 mg tablet    carvedilol (COREG) 3 125 mg tablet   3  Dyslipidemia     4  Morbid obesity ([address] )     5  Tobacco dependence         Overall doing fair today despite recent hospitalization with no chest pain  Med refills provided, currently on appropriate regimen for his chronic conditions  Discussed some healthy lifestyle changes today including increasing activity with regular walks  Discussed options to work with nutritionist for his diabetes and to facilitate weight loss, including referral to weight management center - he will see about speaking to nutritionist at [facility] and consider weight management referral at another time  Discussed smoking cessation, he understands importance of quitting, will defer starting additional medication at this time but that could be an option in the future  Will follow up closely and support his efforts  Pepe Tello acknowledged understanding of treatment plan, all questions answered  Reminded of office on-call physician availability  for any concerns  Plan discussed with attending physician Dr Lui Marquez  Subjective      Pepe Tello is a 45 y o  male  Here for follow up on chronic conditions  -Medication refills: needs 90 day supplies per his insurance, running low on a few chronic meds  -Recent NSTEMI: hospitalized at [facility] 20-20  Had cath and PCI/SABINE of RCA on 2020  Prior history CAD, PCI of LCX in   On current regimen of Aspirin, Brilinta, Atorvastatin, Lisinopril, Coreg  -DM: Last A1C 20 8 4, previously up to 10 4 in 2018   Only saw nutritionist briefly in hospital    -Left groin/thigh mass and pilonidal cyst: Chronic issues, bothering him on/off, did ignore it for some time, was seen by general surgery, pilonidal cyst diagnosed as well, discussed excision at last office visit but given his recent MI with stent placement, risk of elective surgery outweighs benefit, recommendation to follow up with surgery in 3 months  -Obesity, BMI 55: Pt reports weight at home scale has been lower than in office  Weight has been an issue for him, but has not stopped him from some activities, still able to get around ok  The following portions of the patient's history were reviewed and updated as appropriate: allergies, current medications, past family history, past medical history, past social history, past surgical history and problem list     Review of Systems   Constitutional: Negative for chills and fever  Respiratory: Negative for cough and shortness of breath  Cardiovascular: Negative for chest pain and leg swelling  Gastrointestinal: Negative for abdominal pain, nausea and vomiting  Neurological: Negative for dizziness and headaches           Past Medical History:   Diagnosis Date    Coronary artery disease     Diabetes mellitus ([place] [address] )     Heart disease     CAD   s/p ptca with 1 stent 2012    Hyperlipidemia     Hypertension     [facility] )     " silent " M I  in the past    Myocardial infarction ([place] [address] )     Obesity     Pilonidal cyst        Current Outpatient Medications   Medication Sig Dispense Refill    amLODIPine (NORVASC) 10 mg tablet take 1 tablet by mouth once daily 90 tablet 0    aspirin (ECOTRIN LOW STRENGTH) 81 mg EC tablet Take 1 tablet (81 mg total) by mouth daily 30 tablet 0    atorvastatin (LIPITOR) 40 mg tablet Take 1 tablet (40 mg total) by mouth daily 90 tablet 2    carvedilol (COREG) 3 125 mg tablet Take 1 tablet (3 125 mg total) by mouth 2 (two) times a day with meals 180 tablet 1    glimepiride (AMARYL) 4 mg tablet take 1 tablet once daily WITH BREAKFAST 30 tablet 5    lisinopril (ZESTRIL) 10 mg tablet Take 1 tablet (10 mg total) by mouth daily 90 tablet 1    metFORMIN (GLUCOPHAGE) 1000 MG tablet Take 1 tablet (1,000 mg total) by mouth 2 (two) times a day with meals 180 tablet 1    Omega-3 Fatty Acids (fish oil) 1,000 mg Take 1 capsule (1,000 mg total) by mouth 2 (two) times a day 180 capsule 2    ticagrelor (BRILINTA) 90 MG Take 1 tablet (90 mg total) by mouth every 12 (twelve) hours 60 tablet 5     No current facility-administered medications for this visit  Objective      /90 (BP Location: Left arm, Patient Position: Sitting, Cuff Size: Large)   Pulse 85   Temp (!) 97 4 °F (36 3 °C) (Tympanic)   Resp 18   Ht 5' 9" (1 753 m)   Wt (!) 169 kg (373 lb 8 oz)   SpO2 93%   BMI 55 16 kg/m²     Physical Exam  Vitals signs reviewed  Constitutional:       General: He is not in acute distress  Appearance: Normal appearance  He is obese  HENT:      Head: Normocephalic and atraumatic  Right Ear: External ear normal       Left Ear: External ear normal    Eyes:      Conjunctiva/sclera: Conjunctivae normal    Cardiovascular:      Rate and Rhythm: Normal rate and regular rhythm  Heart sounds: Normal heart sounds  No murmur  Pulmonary:      Effort: Pulmonary effort is normal  No respiratory distress  Breath sounds: Normal breath sounds  No wheezing  Skin:     General: Skin is warm and dry  Findings: Lesion (large left groin/thigh mass - appears relatively unchanged compared to clinical image from 1/11/21 surgery visit) present  Neurological:      Mental Status: He is alert and oriented to person, place, and time  Psychiatric:         Mood and Affect: Mood normal          Behavior: Behavior normal                Some portions of this record may have been generated with voice recognition software  There may be translation, syntax, or grammatical errors   Occasional wrong word or "sound-a-like" substitutions may have occurred due to the inherent limitations of the voice recognition software  Read the chart carefully and recognize, using context, where substations may have occurred  If you have any questions, please contact the dictating provider for clarification or correction, as needed

## 2021-01-26 NOTE — TELEPHONE ENCOUNTER
NEED REFILL OF :ticagrelor (BRILINTA) 90 MG     GOING TO [facility] [address]     I EXPLAINED THAT HE HAS REFILLS [facility] AND HE HAS NO IDEA WHAT IM TALKING ABOUT       PLEASE SEND TO [address] [place] [address] Avenue

## 2021-02-12 ENCOUNTER — TELEPHONE (OUTPATIENT)
Dept: CARDIOLOGY CLINIC | Facility: CLINIC | Age: 41
End: 2021-02-12

## 2021-02-12 NOTE — TELEPHONE ENCOUNTER
Patient called for preop biopsy protocol  Please advise on holding blood thinner Brilinta     To be done by General Surgery; 3/8/21

## 2021-02-19 ENCOUNTER — TELEPHONE (OUTPATIENT)
Dept: OTHER | Facility: OTHER | Age: 41
End: 2021-02-19

## 2021-02-19 NOTE — TELEPHONE ENCOUNTER
Patient called in with questions regarding his OV on 3/8   Also, he would like to know when he should get off the Blood thinner

## 2021-02-22 ENCOUNTER — HOSPITAL ENCOUNTER (INPATIENT)
Facility: HOSPITAL | Age: 41
LOS: 1 days | Discharge: HOME | DRG: 603 | End: 2021-02-24
Attending: EMERGENCY MEDICINE | Admitting: FAMILY MEDICINE
Payer: COMMERCIAL

## 2021-02-22 ENCOUNTER — APPOINTMENT (EMERGENCY)
Dept: RADIOLOGY | Facility: HOSPITAL | Age: 41
DRG: 603 | End: 2021-02-22
Payer: COMMERCIAL

## 2021-02-22 DIAGNOSIS — L02.211 ABDOMINAL WALL ABSCESS: Primary | ICD-10-CM

## 2021-02-22 DIAGNOSIS — N17.9 AKI (ACUTE KIDNEY INJURY) (HCC): ICD-10-CM

## 2021-02-22 DIAGNOSIS — E87.1 HYPONATREMIA: ICD-10-CM

## 2021-02-22 PROBLEM — L02.91 SKIN ABSCESS: Status: ACTIVE | Noted: 2021-02-22

## 2021-02-22 LAB
ALBUMIN SERPL BCP-MCNC: 2.5 G/DL (ref 3.5–5)
ALP SERPL-CCNC: 86 U/L (ref 46–116)
ALT SERPL W P-5'-P-CCNC: 19 U/L (ref 12–78)
ANION GAP SERPL CALCULATED.3IONS-SCNC: 8 MMOL/L (ref 4–13)
APTT PPP: 34 SECONDS (ref 23–37)
AST SERPL W P-5'-P-CCNC: 16 U/L (ref 5–45)
BASOPHILS # BLD AUTO: 0.05 THOUSANDS/ΜL (ref 0–0.1)
BASOPHILS NFR BLD AUTO: 0 % (ref 0–1)
BILIRUB SERPL-MCNC: 0.7 MG/DL (ref 0.2–1)
BUN SERPL-MCNC: 33 MG/DL (ref 5–25)
CALCIUM ALBUM COR SERPL-MCNC: 10.1 MG/DL (ref 8.3–10.1)
CALCIUM SERPL-MCNC: 8.9 MG/DL (ref 8.3–10.1)
CHLORIDE SERPL-SCNC: 93 MMOL/L (ref 100–108)
CO2 SERPL-SCNC: 28 MMOL/L (ref 21–32)
CREAT SERPL-MCNC: 1.97 MG/DL (ref 0.6–1.3)
EOSINOPHIL # BLD AUTO: 0.11 THOUSAND/ΜL (ref 0–0.61)
EOSINOPHIL NFR BLD AUTO: 1 % (ref 0–6)
ERYTHROCYTE [DISTWIDTH] IN BLOOD BY AUTOMATED COUNT: 13.3 % (ref 11.6–15.1)
GFR SERPL CREATININE-BSD FRML MDRD: 42 ML/MIN/1.73SQ M
GLUCOSE SERPL-MCNC: 104 MG/DL (ref 65–140)
GLUCOSE SERPL-MCNC: 122 MG/DL (ref 65–140)
GLUCOSE SERPL-MCNC: 53 MG/DL (ref 65–140)
GLUCOSE SERPL-MCNC: 58 MG/DL (ref 65–140)
HCT VFR BLD AUTO: 39.1 % (ref 36.5–49.3)
HGB BLD-MCNC: 12.1 G/DL (ref 12–17)
IMM GRANULOCYTES # BLD AUTO: 0.15 THOUSAND/UL (ref 0–0.2)
IMM GRANULOCYTES NFR BLD AUTO: 1 % (ref 0–2)
INR PPP: 1.14 (ref 0.84–1.19)
LACTATE SERPL-SCNC: 0.9 MMOL/L (ref 0.5–2)
LYMPHOCYTES # BLD AUTO: 1.35 THOUSANDS/ΜL (ref 0.6–4.47)
LYMPHOCYTES NFR BLD AUTO: 7 % (ref 14–44)
MCH RBC QN AUTO: 27.4 PG (ref 26.8–34.3)
MCHC RBC AUTO-ENTMCNC: 30.9 G/DL (ref 31.4–37.4)
MCV RBC AUTO: 89 FL (ref 82–98)
MONOCYTES # BLD AUTO: 1.46 THOUSAND/ΜL (ref 0.17–1.22)
MONOCYTES NFR BLD AUTO: 7 % (ref 4–12)
NEUTROPHILS # BLD AUTO: 17.64 THOUSANDS/ΜL (ref 1.85–7.62)
NEUTS SEG NFR BLD AUTO: 84 % (ref 43–75)
NRBC BLD AUTO-RTO: 0 /100 WBCS
PLATELET # BLD AUTO: 431 THOUSANDS/UL (ref 149–390)
PMV BLD AUTO: 9.2 FL (ref 8.9–12.7)
POTASSIUM SERPL-SCNC: 4.2 MMOL/L (ref 3.5–5.3)
PROT SERPL-MCNC: 8.8 G/DL (ref 6.4–8.2)
PROTHROMBIN TIME: 14.5 SECONDS (ref 11.6–14.5)
RBC # BLD AUTO: 4.41 MILLION/UL (ref 3.88–5.62)
SODIUM SERPL-SCNC: 129 MMOL/L (ref 136–145)
TROPONIN I SERPL-MCNC: <0.02 NG/ML
WBC # BLD AUTO: 20.76 THOUSAND/UL (ref 4.31–10.16)

## 2021-02-22 PROCEDURE — 99285 EMERGENCY DEPT VISIT HI MDM: CPT

## 2021-02-22 PROCEDURE — 96361 HYDRATE IV INFUSION ADD-ON: CPT

## 2021-02-22 PROCEDURE — 80053 COMPREHEN METABOLIC PANEL: CPT | Performed by: PHYSICIAN ASSISTANT

## 2021-02-22 PROCEDURE — 87070 CULTURE OTHR SPECIMN AEROBIC: CPT | Performed by: PHYSICIAN ASSISTANT

## 2021-02-22 PROCEDURE — 10061 I&D ABSCESS COMP/MULTIPLE: CPT | Performed by: PHYSICIAN ASSISTANT

## 2021-02-22 PROCEDURE — 87040 BLOOD CULTURE FOR BACTERIA: CPT | Performed by: PHYSICIAN ASSISTANT

## 2021-02-22 PROCEDURE — 85025 COMPLETE CBC W/AUTO DIFF WBC: CPT | Performed by: PHYSICIAN ASSISTANT

## 2021-02-22 PROCEDURE — 85730 THROMBOPLASTIN TIME PARTIAL: CPT | Performed by: PHYSICIAN ASSISTANT

## 2021-02-22 PROCEDURE — 74177 CT ABD & PELVIS W/CONTRAST: CPT

## 2021-02-22 PROCEDURE — 36415 COLL VENOUS BLD VENIPUNCTURE: CPT | Performed by: PHYSICIAN ASSISTANT

## 2021-02-22 PROCEDURE — 82948 REAGENT STRIP/BLOOD GLUCOSE: CPT

## 2021-02-22 PROCEDURE — 3066F NEPHROPATHY DOC TX: CPT | Performed by: SURGERY

## 2021-02-22 PROCEDURE — 87205 SMEAR GRAM STAIN: CPT | Performed by: PHYSICIAN ASSISTANT

## 2021-02-22 PROCEDURE — 99285 EMERGENCY DEPT VISIT HI MDM: CPT | Performed by: PHYSICIAN ASSISTANT

## 2021-02-22 PROCEDURE — 87147 CULTURE TYPE IMMUNOLOGIC: CPT | Performed by: PHYSICIAN ASSISTANT

## 2021-02-22 PROCEDURE — 85610 PROTHROMBIN TIME: CPT | Performed by: PHYSICIAN ASSISTANT

## 2021-02-22 PROCEDURE — G1004 CDSM NDSC: HCPCS

## 2021-02-22 PROCEDURE — 84484 ASSAY OF TROPONIN QUANT: CPT | Performed by: PHYSICIAN ASSISTANT

## 2021-02-22 PROCEDURE — 0J983ZZ DRAINAGE OF ABDOMEN SUBCUTANEOUS TISSUE AND FASCIA, PERCUTANEOUS APPROACH: ICD-10-PCS | Performed by: EMERGENCY MEDICINE

## 2021-02-22 PROCEDURE — 83605 ASSAY OF LACTIC ACID: CPT | Performed by: PHYSICIAN ASSISTANT

## 2021-02-22 PROCEDURE — 96374 THER/PROPH/DIAG INJ IV PUSH: CPT

## 2021-02-22 PROCEDURE — 99219 PR INITIAL OBSERVATION CARE/DAY 50 MINUTES: CPT | Performed by: FAMILY MEDICINE

## 2021-02-22 PROCEDURE — 93005 ELECTROCARDIOGRAM TRACING: CPT

## 2021-02-22 RX ORDER — SODIUM CHLORIDE 9 MG/ML
125 INJECTION, SOLUTION INTRAVENOUS CONTINUOUS
Status: DISCONTINUED | OUTPATIENT
Start: 2021-02-22 | End: 2021-02-24 | Stop reason: HOSPADM

## 2021-02-22 RX ORDER — HYDROMORPHONE HCL/PF 1 MG/ML
0.5 SYRINGE (ML) INJECTION ONCE
Status: COMPLETED | OUTPATIENT
Start: 2021-02-22 | End: 2021-02-22

## 2021-02-22 RX ORDER — ASPIRIN 81 MG/1
81 TABLET ORAL DAILY
Status: DISCONTINUED | OUTPATIENT
Start: 2021-02-22 | End: 2021-02-24 | Stop reason: HOSPADM

## 2021-02-22 RX ORDER — ACETAMINOPHEN 325 MG/1
975 TABLET ORAL ONCE
Status: COMPLETED | OUTPATIENT
Start: 2021-02-22 | End: 2021-02-22

## 2021-02-22 RX ORDER — NICOTINE 21 MG/24HR
1 PATCH, TRANSDERMAL 24 HOURS TRANSDERMAL DAILY
Status: DISCONTINUED | OUTPATIENT
Start: 2021-02-22 | End: 2021-02-24 | Stop reason: HOSPADM

## 2021-02-22 RX ORDER — ACETAMINOPHEN 325 MG/1
650 TABLET ORAL EVERY 6 HOURS PRN
Status: DISCONTINUED | OUTPATIENT
Start: 2021-02-22 | End: 2021-02-23

## 2021-02-22 RX ORDER — MORPHINE SULFATE 4 MG/ML
4 INJECTION, SOLUTION INTRAMUSCULAR; INTRAVENOUS ONCE
Status: COMPLETED | OUTPATIENT
Start: 2021-02-22 | End: 2021-02-22

## 2021-02-22 RX ORDER — ONDANSETRON 2 MG/ML
4 INJECTION INTRAMUSCULAR; INTRAVENOUS ONCE
Status: COMPLETED | OUTPATIENT
Start: 2021-02-22 | End: 2021-02-22

## 2021-02-22 RX ORDER — CLINDAMYCIN PHOSPHATE 600 MG/50ML
600 INJECTION INTRAVENOUS ONCE
Status: COMPLETED | OUTPATIENT
Start: 2021-02-22 | End: 2021-02-22

## 2021-02-22 RX ORDER — ONDANSETRON 2 MG/ML
4 INJECTION INTRAMUSCULAR; INTRAVENOUS EVERY 6 HOURS PRN
Status: DISCONTINUED | OUTPATIENT
Start: 2021-02-22 | End: 2021-02-24 | Stop reason: HOSPADM

## 2021-02-22 RX ORDER — AMLODIPINE BESYLATE 10 MG/1
10 TABLET ORAL DAILY
Status: DISCONTINUED | OUTPATIENT
Start: 2021-02-22 | End: 2021-02-24 | Stop reason: HOSPADM

## 2021-02-22 RX ORDER — CARVEDILOL 3.12 MG/1
3.12 TABLET ORAL 2 TIMES DAILY WITH MEALS
Status: DISCONTINUED | OUTPATIENT
Start: 2021-02-22 | End: 2021-02-24 | Stop reason: HOSPADM

## 2021-02-22 RX ORDER — ATORVASTATIN CALCIUM 40 MG/1
40 TABLET, FILM COATED ORAL
Status: DISCONTINUED | OUTPATIENT
Start: 2021-02-22 | End: 2021-02-24 | Stop reason: HOSPADM

## 2021-02-22 RX ORDER — LIDOCAINE HYDROCHLORIDE AND EPINEPHRINE 10; 10 MG/ML; UG/ML
20 INJECTION, SOLUTION INFILTRATION; PERINEURAL ONCE
Status: COMPLETED | OUTPATIENT
Start: 2021-02-22 | End: 2021-02-22

## 2021-02-22 RX ADMIN — ASPIRIN 81 MG: 81 TABLET, COATED ORAL at 16:23

## 2021-02-22 RX ADMIN — ACETAMINOPHEN 975 MG: 325 TABLET, FILM COATED ORAL at 23:42

## 2021-02-22 RX ADMIN — ONDANSETRON 4 MG: 2 INJECTION INTRAMUSCULAR; INTRAVENOUS at 10:10

## 2021-02-22 RX ADMIN — HYDROMORPHONE HYDROCHLORIDE 0.5 MG: 1 INJECTION, SOLUTION INTRAMUSCULAR; INTRAVENOUS; SUBCUTANEOUS at 14:37

## 2021-02-22 RX ADMIN — SODIUM CHLORIDE 125 ML/HR: 0.9 INJECTION, SOLUTION INTRAVENOUS at 16:25

## 2021-02-22 RX ADMIN — CLINDAMYCIN PHOSPHATE 600 MG: 600 INJECTION, SOLUTION INTRAVENOUS at 12:59

## 2021-02-22 RX ADMIN — ACETAMINOPHEN 650 MG: 325 TABLET, FILM COATED ORAL at 16:45

## 2021-02-22 RX ADMIN — VANCOMYCIN HYDROCHLORIDE 2000 MG: 10 INJECTION, POWDER, LYOPHILIZED, FOR SOLUTION INTRAVENOUS at 16:25

## 2021-02-22 RX ADMIN — ENOXAPARIN SODIUM 40 MG: 40 INJECTION SUBCUTANEOUS at 16:23

## 2021-02-22 RX ADMIN — ATORVASTATIN CALCIUM 40 MG: 40 TABLET, FILM COATED ORAL at 16:23

## 2021-02-22 RX ADMIN — LIDOCAINE HYDROCHLORIDE,EPINEPHRINE BITARTRATE 20 ML: 10; .01 INJECTION, SOLUTION INFILTRATION; PERINEURAL at 13:01

## 2021-02-22 RX ADMIN — MORPHINE SULFATE 4 MG: 4 INJECTION INTRAVENOUS at 12:57

## 2021-02-22 RX ADMIN — Medication 16 G: at 23:11

## 2021-02-22 RX ADMIN — MORPHINE SULFATE 2 MG: 2 INJECTION, SOLUTION INTRAMUSCULAR; INTRAVENOUS at 23:49

## 2021-02-22 RX ADMIN — SODIUM CHLORIDE 1000 ML: 0.9 INJECTION, SOLUTION INTRAVENOUS at 10:08

## 2021-02-22 RX ADMIN — IOHEXOL 100 ML: 350 INJECTION, SOLUTION INTRAVENOUS at 11:14

## 2021-02-22 RX ADMIN — CARVEDILOL 3.12 MG: 3.12 TABLET, FILM COATED ORAL at 16:23

## 2021-02-22 RX ADMIN — TICAGRELOR 90 MG: 90 TABLET ORAL at 20:14

## 2021-02-22 NOTE — ED PROVIDER NOTES
History  Chief Complaint   Patient presents with    Skin Irritation     pt has lower abd redness/ skin irritation- pt has had multiple skin infections in past; took advil this am- 9/10 pain     45year old male presents with abdominal wound x2 days  It began Saturday and has gotten larger and more swollen  It is painful and sharp in nature, constant  Last night he developed nausea and vomited once  He states that this morning while he was at work he felt lightheaded like he was going to pass out, he felt very sweaty  He has chills  He had worsened abdominal pain  He went home after work, and then drove himself here for evaluation  He denies any chest pain, has history CAD with 2 stents  He also has a large mass on his left inner thigh, for which he is following with general surgery, he is due to get the mass biopsied in few weeks  He states that the mass is actually not bothering him at this time, and appears better than it has in the past   He denies any fever, chest pain, difficulty breathing, shortness of breath, diarrhea, constipation, headache, dizziness, weakness  Prior to Admission Medications   Prescriptions Last Dose Informant Patient Reported? Taking?    Omega-3 Fatty Acids (fish oil) 1,000 mg   No Yes   Sig: Take 1 capsule (1,000 mg total) by mouth 2 (two) times a day   amLODIPine (NORVASC) 10 mg tablet   No Yes   Sig: take 1 tablet by mouth once daily   aspirin (ECOTRIN LOW STRENGTH) 81 mg EC tablet   No Yes   Sig: Take 1 tablet (81 mg total) by mouth daily   atorvastatin (LIPITOR) 40 mg tablet   No Yes   Sig: Take 1 tablet (40 mg total) by mouth daily   carvedilol (COREG) 3 125 mg tablet   No Yes   Sig: Take 1 tablet (3 125 mg total) by mouth 2 (two) times a day with meals   glimepiride (AMARYL) 4 mg tablet   No Yes   Sig: take 1 tablet once daily WITH BREAKFAST   lisinopril (ZESTRIL) 10 mg tablet   No Yes   Sig: Take 1 tablet (10 mg total) by mouth daily   metFORMIN (GLUCOPHAGE) 1000 MG tablet   No Yes   Sig: Take 1 tablet (1,000 mg total) by mouth 2 (two) times a day with meals   ticagrelor (BRILINTA) 90 MG   No Yes   Sig: Take 1 tablet (90 mg total) by mouth every 12 (twelve) hours      Facility-Administered Medications: None       Past Medical History:   Diagnosis Date    Coronary artery disease     Diabetes mellitus ([facility] )     Heart disease     CAD   s/p ptca with 1 stent 2012    Hyperlipidemia     Hypertension     MI (myocardial infarction) ([facility] )     " silent " M I  in the past    Myocardial infarction ([facility] )     Obesity     Pilonidal cyst        Past Surgical History:   Procedure Laterality Date    CORONARY ANGIOPLASTY WITH STENT PLACEMENT      INCISION AND DRAINAGE OF WOUND N/A 1/24/2017    Procedure: INCISION AND DRAINAGE (I&D) BUTTOCK, PILONIDAL CYST;  Surgeon: Jo-Ann Norris MD;  Location: [address] [place] [address];  Service:    [facility] LEG SURGERY Left     I&D of left leg 2012       Family History   Problem Relation Age of Onset    Heart disease Father      I have reviewed and agree with the history as documented  E-Cigarette/Vaping    E-Cigarette Use Never User      E-Cigarette/Vaping Substances    Nicotine No     THC No     CBD No     Flavoring No     Other No     Unknown No      Social History     Tobacco Use    Smoking status: Current Every Day Smoker     Packs/day: 1 00     Years: 20 00     Pack years: 20 00     Types: Cigarettes    Smokeless tobacco: Never Used   Substance Use Topics    Alcohol use: Yes     Comment: rarely    Drug use: No       Review of Systems   Constitutional: Negative for chills and fever  HENT: Negative for sneezing and sore throat  Respiratory: Negative for cough and shortness of breath  Cardiovascular: Negative for chest pain, palpitations and leg swelling  Gastrointestinal: Negative for abdominal pain, constipation, diarrhea, nausea and vomiting  Musculoskeletal: Negative for back pain, gait problem, joint swelling and myalgias     Skin: Positive for rash  Negative for color change, pallor and wound  Neurological: Positive for light-headedness  Negative for dizziness, syncope, weakness, numbness and headaches  All other systems reviewed and are negative  Physical Exam  Physical Exam  Vitals signs and nursing note reviewed  Constitutional:       General: He is not in acute distress  Appearance: Normal appearance  He is well-developed  He is obese  He is not diaphoretic  HENT:      Head: Normocephalic and atraumatic  Nose: Nose normal    Eyes:      Extraocular Movements: Extraocular movements intact  Conjunctiva/sclera: Conjunctivae normal    Neck:      Musculoskeletal: Normal range of motion  Cardiovascular:      Rate and Rhythm: Normal rate and regular rhythm  Pulses: Normal pulses  Heart sounds: Normal heart sounds  No murmur  No friction rub  No gallop  Pulmonary:      Effort: Pulmonary effort is normal  No respiratory distress  Breath sounds: Normal breath sounds  No stridor  No wheezing or rales  Comments: Sp02 is 97% indicating adequate oxygenation on room air  Abdominal:      General: Abdomen is flat  Bowel sounds are normal  There is no distension  Palpations: Abdomen is soft  Tenderness: There is abdominal tenderness  There is no guarding or rebound  Musculoskeletal: Normal range of motion  Legs:    Skin:     General: Skin is warm and dry  Capillary Refill: Capillary refill takes less than 2 seconds  Coloration: Skin is not pale  Findings: Rash present  No erythema  Neurological:      Mental Status: He is alert  Mental status is at baseline           Vital Signs  ED Triage Vitals   Temperature Pulse Respirations Blood Pressure SpO2   02/22/21 0849 02/22/21 0849 02/22/21 0849 02/22/21 0849 02/22/21 0849   97 6 °F (36 4 °C) 74 16 136/62 97 %      Temp Source Heart Rate Source Patient Position - Orthostatic VS BP Location FiO2 (%)   02/22/21 0849 02/22/21 1100 02/22/21 0849 02/22/21 0849 --   Tympanic Monitor Sitting Right arm       Pain Score       02/22/21 0849       9           Vitals:    02/22/21 1330 02/22/21 1400 02/22/21 1415 02/22/21 1430   BP: 127/73 142/66 142/60 115/62   Pulse: 72 66 70 66   Patient Position - Orthostatic VS: Lying Lying Lying Lying         Visual Acuity      ED Medications  Medications   insulin lispro (HumaLOG) 100 units/mL subcutaneous injection 1-6 Units (has no administration in time range)   vancomycin (VANCOCIN) 1,750 mg in sodium chloride 0 9 % 500 mL IVPB (has no administration in time range)   vancomycin (VANCOCIN) 2,000 mg in sodium chloride 0 9 % 500 mL IVPB (has no administration in time range)   sodium chloride 0 9 % bolus 1,000 mL (0 mL Intravenous Stopped 2/22/21 1428)   ondansetron (ZOFRAN) injection 4 mg (4 mg Intravenous Given 2/22/21 1010)   iohexol (OMNIPAQUE) 350 MG/ML injection (SINGLE-DOSE) 100 mL (100 mL Intravenous Given 2/22/21 1114)   morphine (PF) 4 mg/mL injection 4 mg (4 mg Intravenous Given 2/22/21 1257)   clindamycin (CLEOCIN) IVPB (premix in dextrose) 600 mg 50 mL (0 mg Intravenous Stopped 2/22/21 1428)   lidocaine-epinephrine (XYLOCAINE/EPINEPHRINE) 1 %-1:100,000 injection 20 mL (20 mL Infiltration Given 2/22/21 1301)   HYDROmorphone (DILAUDID) injection 0 5 mg (0 5 mg Intravenous Given 2/22/21 1437)       Diagnostic Studies  Results Reviewed     Procedure Component Value Units Date/Time    Sodium, urine, random [[id number]]     Lab Status: No result Specimen: Urine     Osmolality, urine [[id number]]     Lab Status: No result Specimen: Urine     Wound culture and Gram stain [[id number]] Collected: 02/22/21 1438    Lab Status:  In process Specimen: Wound from Abdominal Updated: 02/22/21 1442    Comprehensive metabolic panel [[id number]]  (Abnormal) Collected: 02/22/21 1002    Lab Status: Final result Specimen: Blood from Arm, Right Updated: 02/22/21 1040     Sodium 129 mmol/L      Potassium 4 2 mmol/L      Chloride 93 mmol/L      CO2 28 mmol/L      ANION GAP 8 mmol/L      BUN 33 mg/dL      Creatinine 1 97 mg/dL      Glucose 104 mg/dL      Calcium 8 9 mg/dL      Corrected Calcium 10 1 mg/dL      AST 16 U/L      ALT 19 U/L      Alkaline Phosphatase 86 U/L      Total Protein 8 8 g/dL      Albumin 2 5 g/dL      Total Bilirubin 0 70 mg/dL      eGFR 42 ml/min/1 73sq m     Narrative:      [place] guidelines for Chronic Kidney Disease (CKD):     Stage 1 with normal or high GFR (GFR > 90 mL/min/1 73 square meters)    Stage 2 Mild CKD (GFR = 60-89 mL/min/1 73 square meters)    Stage 3A Moderate CKD (GFR = 45-59 mL/min/1 73 square meters)    Stage 3B Moderate CKD (GFR = 30-44 mL/min/1 73 square meters)    Stage 4 Severe CKD (GFR = 15-29 mL/min/1 73 square meters)    Stage 5 End Stage CKD (GFR <15 mL/min/1 73 square meters)  Note: GFR calculation is accurate only with a steady state creatinine    Troponin I [[id number]]  (Normal) Collected: 02/22/21 1002    Lab Status: Final result Specimen: Blood from Arm, Right Updated: 02/22/21 1040     Troponin I <0 02 ng/mL     Lactic acid [[id number]]  (Normal) Collected: 02/22/21 1002    Lab Status: Final result Specimen: Blood from Arm, Right Updated: 02/22/21 1039     LACTIC ACID 0 9 mmol/L     Narrative:      Result may be elevated if tourniquet was used during collection      Protime-INR [[id number]]  (Normal) Collected: 02/22/21 1002    Lab Status: Final result Specimen: Blood from Arm, Right Updated: 02/22/21 1027     Protime 14 5 seconds      INR 1 14    APTT [[id number]]  (Normal) Collected: 02/22/21 1002    Lab Status: Final result Specimen: Blood from Arm, Right Updated: 02/22/21 1027     PTT 34 seconds     CBC and differential [[id number]]  (Abnormal) Collected: 02/22/21 1002    Lab Status: Final result Specimen: Blood from Arm, Right Updated: 02/22/21 1013     WBC 20 76 Thousand/uL      RBC 4 41 Million/uL      Hemoglobin 12 1 g/dL      Hematocrit 39 1 % MCV 89 fL      MCH 27 4 pg      MCHC 30 9 g/dL      RDW 13 3 %      MPV 9 2 fL      Platelets 849 Thousands/uL      nRBC 0 /100 WBCs      Neutrophils Relative 84 %      Immat GRANS % 1 %      Lymphocytes Relative 7 %      Monocytes Relative 7 %      Eosinophils Relative 1 %      Basophils Relative 0 %      Neutrophils Absolute 17 64 Thousands/µL      Immature Grans Absolute 0 15 Thousand/uL      Lymphocytes Absolute 1 35 Thousands/µL      Monocytes Absolute 1 46 Thousand/µL      Eosinophils Absolute 0 11 Thousand/µL      Basophils Absolute 0 05 Thousands/µL     Blood culture #2 [[id number]] Collected: 02/22/21 1008    Lab Status: In process Specimen: Blood from Arm, Left Updated: 02/22/21 1013    Blood culture #1 [[id number]] Collected: 02/22/21 1003    Lab Status: In process Specimen: Blood from Hand, Right Updated: 02/22/21 1012                 CT abdomen pelvis with contrast   Final Result by Jorge Alberto Vines MD (02/22 1224)      3 6 cm superficial subcutaneous abscess at the inferior margin of the panniculus  I personally discussed this study with Klaus Both on 2/22/2021 at 12:24 PM                         Workstation performed: [id number]                    Procedures  ECG 12 Lead Documentation Only    Date/Time: 2/22/2021 9:51 AM  Performed by: Jeaneth Fish PA-C  Authorized by: Jeaneth Fish PA-C     Indications / Diagnosis:  Lightheaded  Patient location:  ED  Interpretation:     Interpretation: abnormal    Rate:     ECG rate:  63    ECG rate assessment: normal    Rhythm:     Rhythm: sinus rhythm    Ectopy:     Ectopy: none    QRS:     QRS axis:  Normal    QRS intervals:  Normal  Conduction:     Conduction: normal    ST segments:     ST segments:  Normal  T waves:     T waves: normal      Incision and drain    Date/Time: 2/22/2021 2:30 PM  Performed by: Jeaneth Fsih PA-C  Authorized by:  Jeaneth Fish PA-C   Universal Protocol:  Procedure performed by: (Lila Silva Student)  Consent: Verbal consent obtained  Risks and benefits: risks, benefits and alternatives were discussed  Consent given by: patient  Patient understanding: patient states understanding of the procedure being performed  Patient consent: the patient's understanding of the procedure matches consent given  Procedure consent: procedure consent matches procedure scheduled  Relevant documents: relevant documents present and verified  Test results: test results available and properly labeled  Site marked: the operative site was marked  Radiology Images displayed and confirmed  If images not available, report reviewed: imaging studies available  Required items: required blood products, implants, devices, and special equipment available  Patient identity confirmed: verbally with patient      Patient location:  ED  Location:     Type:  Abscess    Size:  4x4 cm    Location:  Trunk    Trunk location:  Abdomen  Pre-procedure details:     Skin preparation:  Betadine  Anesthesia (see MAR for exact dosages): Anesthesia method:  Local infiltration    Local anesthetic:  Lidocaine 1% WITH epi  Procedure details:     Incision types:  Stab incision    Scalpel blade:  11    Approach:  Open    Incision depth:  Subcutaneous and submucosal    Wound management:  Probed and deloculated    Drainage:  Bloody and purulent    Drainage amount:  Copious    Wound treatment:  Packing placed    Packing materials:  1/4 in iodoform gauze  Post-procedure details:     Patient tolerance of procedure: Tolerated well, no immediate complications             ED Course  ED Course as of Feb 22 1527   Mon Feb 22, 2021   1020 WBC(!): 20 76   1054 New CLOVIS   Creatinine(!): 1 97   1054 Sodium(!): 129   1223 Spoke with Dr Sarah Turner in radiology, patient has 3 6cm abscess at pannus fold, at surface of skin  MDM  Number of Diagnoses or Management Options  Abdominal wall abscess:   CLOVIS (acute kidney injury) ([facility] ):    Hyponatremia:   Diagnosis management comments: Will admit for abdominal wall abscess, hyponatremia, CLOVIS  I&D performed at bedside with success, copious purulent drainage removed and wound packing placed      Disposition  Final diagnoses:   Abdominal wall abscess   Hyponatremia   CLOVIS (acute kidney injury) ([facility] )     Time reflects when diagnosis was documented in both MDM as applicable and the Disposition within this note     Time User Action Codes Description Comment    2/22/2021 12:47 PM Caye Nageotte Add [L02 211] Abdominal wall abscess     2/22/2021 12:47 PM Caye Nageotte Add [E87 1] Hyponatremia     2/22/2021 12:47 PM Caye Nageotte Add [N17 9] CLOVIS (acute kidney injury) [facility])       ED Disposition     ED Disposition Condition Date/Time Comment    Admit Stable Mon Feb 22, 2021 12:51 PM Case was discussed with Dr Patrick Gonzalez and the patient's admission status was agreed to be Admission Status: observation status to the service of Dr Patrick Gonzalez           Follow-up Information    None         Current Discharge Medication List      CONTINUE these medications which have NOT CHANGED    Details   amLODIPine (NORVASC) 10 mg tablet take 1 tablet by mouth once daily  Qty: 90 tablet, Refills: 0    Associated Diagnoses: Essential hypertension      aspirin (ECOTRIN LOW STRENGTH) 81 mg EC tablet Take 1 tablet (81 mg total) by mouth daily  Qty: 30 tablet, Refills: 0    Associated Diagnoses: CAD (coronary artery disease)      atorvastatin (LIPITOR) 40 mg tablet Take 1 tablet (40 mg total) by mouth daily  Qty: 90 tablet, Refills: 2    Associated Diagnoses: Dyslipidemia      carvedilol (COREG) 3 125 mg tablet Take 1 tablet (3 125 mg total) by mouth 2 (two) times a day with meals  Qty: 180 tablet, Refills: 1    Associated Diagnoses: Essential hypertension      glimepiride (AMARYL) 4 mg tablet take 1 tablet once daily WITH BREAKFAST  Qty: 30 tablet, Refills: 5    Associated Diagnoses: DM type 2, uncontrolled, with neuropathy (HCC)      lisinopril (ZESTRIL) 10 mg tablet Take 1 tablet (10 mg total) by mouth daily  Qty: 90 tablet, Refills: 1    Associated Diagnoses: Essential hypertension      metFORMIN (GLUCOPHAGE) 1000 MG tablet Take 1 tablet (1,000 mg total) by mouth 2 (two) times a day with meals  Qty: 180 tablet, Refills: 1    Associated Diagnoses: DM type 2, uncontrolled, with neuropathy (HCC)      Omega-3 Fatty Acids (fish oil) 1,000 mg Take 1 capsule (1,000 mg total) by mouth 2 (two) times a day  Qty: 180 capsule, Refills: 2    Associated Diagnoses: Dyslipidemia      ticagrelor (BRILINTA) 90 MG Take 1 tablet (90 mg total) by mouth every 12 (twelve) hours  Qty: 60 tablet, Refills: 5    Associated Diagnoses: CAD (coronary artery disease)           No discharge procedures on file      PDMP Review     None          ED Provider  Electronically Signed by           Tracy Crews PA-C  02/22/21 4924

## 2021-02-22 NOTE — ASSESSMENT & PLAN NOTE
Last lipid panel 11/29/2020 , , HDL 29,     Patient takes atorvastatin 40 mg q d  at home  · Continue home medications

## 2021-02-22 NOTE — PROGRESS NOTES
Vancomycin Assessment    Denia Modi is a 45 y o  male who is currently receiving vancomycin Vancomycin 2g IV x1 and then, Vancomycin 1750 mg IV q12h for skin-soft tissue infection, other Mild Cellulitis   Relevant clinical data and objective history reviewed:  Creatinine   Date Value Ref Range Status   02/22/2021 1 97 (H) 0 60 - 1 30 mg/dL Final     Comment:     Standardized to IDMS reference method   12/02/2020 0 90 0 60 - 1 30 mg/dL Final     Comment:     Standardized to IDMS reference method   12/01/2020 1 00 0 60 - 1 30 mg/dL Final     Comment:     Standardized to IDMS reference method   12/29/2016 1 07 0 76 - 1 27 mg/dL Final     /62 (BP Location: Left arm)   Pulse 66   Temp 97 6 °F (36 4 °C) (Tympanic)   Resp 20   SpO2 96%   No intake/output data recorded  Lab Results   Component Value Date/Time    BUN 33 (H) 02/22/2021 10:02 AM    BUN 19 12/29/2016 11:35 AM    WBC 20 76 (H) 02/22/2021 10:02 AM    WBC 8 1 01/23/2017 10:55 AM    HGB 12 1 02/22/2021 10:02 AM    HGB 14 5 01/23/2017 10:55 AM    HCT 39 1 02/22/2021 10:02 AM    HCT 43 8 01/23/2017 10:55 AM    MCV 89 02/22/2021 10:02 AM    MCV 89 01/23/2017 10:55 AM     (H) 02/22/2021 10:02 AM     01/23/2017 10:55 AM     Temp Readings from Last 3 Encounters:   02/22/21 97 6 °F (36 4 °C) (Tympanic)   01/26/21 (!) 97 4 °F (36 3 °C) (Tympanic)   01/14/21 97 6 °F (36 4 °C) (Temporal)     Vancomycin Days of Therapy: 1    Assessment/Plan  The patient is currently on vancomycin utilizing scheduled dosing based on adjusted body weight (due to obesity)  Baseline risks associated with therapy include: concomitant nephrotoxic medications  The patient is currently receiving Vancomycin 2g IV x1 and then, Vancomycin 1750 mg IV q12h and is clinically appropriate and dose will be continued  Pharmacy will also follow closely for s/sx of nephrotoxicity, infusion reactions, and appropriateness of therapy  BMP and CBC will be ordered per protocol  Plan for trough as patient approaches steady state, prior to the 5th  dose at approximately 1600 on 02/24/2021  Due to infection severity, will target a trough of 10-15 (mild infection/cellulitis)   Pharmacy will continue to follow the patients culture results and clinical progress daily      Zia Robertson, Pharmacist

## 2021-02-22 NOTE — ED NOTES
Patient c/o 10/10 pain in abdomen and is asking for pain medication or advil   Lolly Flavors PA made aware     Corinne Ruff, RN  02/22/21 9825

## 2021-02-22 NOTE — ASSESSMENT & PLAN NOTE
On presentation Na 129  Patient does not take an diuretics  · Urine NA, Urine osmol    · Monitor daily  · IVF NS @ 125

## 2021-02-22 NOTE — ASSESSMENT & PLAN NOTE
Patient's BP up to 140/63 in ED    Patient takes Norvasc 10 mg, Coreg 3 125 BID, and lisinopril 10 mg qd  · Continue patient's Norvasc and Coreg  · Hold lisinopril given CLOVIS

## 2021-02-22 NOTE — ASSESSMENT & PLAN NOTE
Lab Results   Component Value Date    HGBA1C 8 4 (H) 11/29/2020     Last A1c 11/29 8 4  At home patient takes glimepiride 4 mg Q a m , metformin 1000 mg b i d    · Hold patient's home medications  · ISS

## 2021-02-22 NOTE — H&P
H&P Exam - Raquel Drain 45 y o  male MRN: [id number]    Unit/Bed#: ED 09 Encounter: [id number]    Patient is a 45 y o   male  with a PMH of CAD, DM, HTN presenting with abdominal pain for past 2 days  Patient was admitted to inpatient service under the care of Dr Miki Pink  Patient's anticipated stay is anticipated stay is less than 2 midnights  Assessment/Plan     Assessment:  * Skin abscess  Assessment & Plan  Abdominal wall skin abscess of panus  In ED patient started on clindamycin 600 mg, I&D attempted  Wound cultures sent  · Vancomycin renal dose given CLOVIS  · Follow-up wound cultures    CAD (coronary artery disease)  Assessment & Plan  Patient history of cardiac catheterization x2 in 12/01/2020 PCI mid RCA 80% lesion  In 2010 PCI of LCX  Patient denies chest pain on admission, troponin negative x1  At home patient takes Aspirin 81 mg qd, Atorvastatin 40 mg qd, Lisinopril 10 mg, Coreg 3 125 mg, Brilinta 90 mg Q12    · Continue patient's Aspirin 81 mg qd, Atorvastatin 40 mg qd, Coreg 3 125 mg, Brilinta 90 mg Q12  · Hold lisinopril in setting of CLOVIS  Hyponatremia  Assessment & Plan  On presentation Na 129  Patient does not take an diuretics  · Urine NA, Urine osmol  · Monitor daily  · IVF NS @ 125    CLOVIS (acute kidney injury) ([facility] [address] [facility] )  Assessment & Plan  On admission patient's creatinine 1 97, baseline around 1  Patient BUN 33 ratio 16 7     · Hold lisinopril in setting of CLOVIS  · IVF with NS    DM type 2, uncontrolled, with neuropathy [facility])  Assessment & Plan    Lab Results   Component Value Date    HGBA1C 8 4 (H) 11/29/2020     Last A1c 11/29 8 4  At home patient takes glimepiride 4 mg Q a m , metformin 1000 mg b i d  · Hold patient's home medications  · ISS        HTN (hypertension)  Assessment & Plan  Patient's BP up to 140/63 in ED    Patient takes Norvasc 10 mg, Coreg 3 125 BID, and lisinopril 10 mg qd  · Continue patient's Norvasc and Coreg  · Hold lisinopril given CLOVIS    Dyslipidemia  Assessment & Plan  Last lipid panel 11/29/2020 , , HDL 29,   Patient takes atorvastatin 40 mg q d  at home  · Continue home medications          History of Present Illness      HPI:  Marino Landry is a 45 y o  male with PMH of DM , CAD, HTN, hyperlipidemia who presents with 2 day history of abdominal wound  Patient reports pannus has become more red, swollen, and painful  Reports subjective fever and malaise  Last night had episode of vomiting  On day of admission patient felt light headed and sweaty, prompting him to go to ED for evaluation  In ED patient found to be hyponatremic with sodium of 129  He denies chest pain, shortness of breath, diarrhea, constipation, headache, dizziness, weakness, leg pain leg swelling  PCP: No primary care provider on file      Review of Systems    Historical Information   Past Medical History:   Diagnosis Date    Coronary artery disease     Diabetes mellitus ([facility] [address] )     Heart disease     CAD   s/p ptca with 1 stent 2012    Hyperlipidemia     Hypertension     MI (myocardial infarction) ([facility] [address] )     " silent " M I  in the past    [facility]     Obesity     Pilonidal cyst      Past Surgical History:   Procedure Laterality Date    CORONARY ANGIOPLASTY WITH STENT PLACEMENT      INCISION AND DRAINAGE OF WOUND N/A 1/24/2017    Procedure: INCISION AND DRAINAGE (I&D) BUTTOCK, PILONIDAL CYST;  Surgeon: Fabiana Mckeon MD;  Location: [address] [place] [address];  Service:    [facility] LEG SURGERY Left     I&D of left leg 2012     Social History   Social History     Substance and Sexual Activity   Alcohol Use Yes    Comment: rarely     Social History     Substance and Sexual Activity   Drug Use No     Social History     Tobacco Use   Smoking Status Current Every Day Smoker    Packs/day: 1 00    Years: 20 00    Pack years: 20 00    Types: Cigarettes   Smokeless Tobacco Never Used     E-Cigarette/Vaping    E-Cigarette Use Never User E-Cigarette/Vaping Substances    Nicotine No     THC No     CBD No     Flavoring No     Other No     Unknown No      Family History: non-contributory    Meds/Allergies   PTA meds:   Prior to Admission Medications   Prescriptions Last Dose Informant Patient Reported? Taking? Omega-3 Fatty Acids (fish oil) 1,000 mg   No Yes   Sig: Take 1 capsule (1,000 mg total) by mouth 2 (two) times a day   amLODIPine (NORVASC) 10 mg tablet   No Yes   Sig: take 1 tablet by mouth once daily   aspirin (ECOTRIN LOW STRENGTH) 81 mg EC tablet   No Yes   Sig: Take 1 tablet (81 mg total) by mouth daily   atorvastatin (LIPITOR) 40 mg tablet   No Yes   Sig: Take 1 tablet (40 mg total) by mouth daily   carvedilol (COREG) 3 125 mg tablet   No Yes   Sig: Take 1 tablet (3 125 mg total) by mouth 2 (two) times a day with meals   glimepiride (AMARYL) 4 mg tablet   No Yes   Sig: take 1 tablet once daily WITH BREAKFAST   lisinopril (ZESTRIL) 10 mg tablet   No Yes   Sig: Take 1 tablet (10 mg total) by mouth daily   metFORMIN (GLUCOPHAGE) 1000 MG tablet   No Yes   Sig: Take 1 tablet (1,000 mg total) by mouth 2 (two) times a day with meals   ticagrelor (BRILINTA) 90 MG   No Yes   Sig: Take 1 tablet (90 mg total) by mouth every 12 (twelve) hours      Facility-Administered Medications: None     Allergies   Allergen Reactions    Amoxicillin        Objective   Vitals: Blood pressure 140/63, pulse 76, temperature 97 6 °F (36 4 °C), temperature source Tympanic, resp  rate 22, SpO2 96 %  No intake or output data in the 24 hours ending 02/22/21 1358    Invasive Devices     Peripheral Intravenous Line            Peripheral IV 02/22/21 Right Antecubital less than 1 day                Physical Exam  Constitutional:       General: He is not in acute distress  Appearance: He is obese  HENT:      Head: Normocephalic and atraumatic  Nose: Congestion present        Mouth/Throat:      Mouth: Mucous membranes are moist       Pharynx: Oropharynx is clear    Cardiovascular:      Rate and Rhythm: Normal rate and regular rhythm  Pulses: Normal pulses  Heart sounds: Normal heart sounds  No murmur  No friction rub  No gallop  Pulmonary:      Effort: Pulmonary effort is normal  No respiratory distress  Breath sounds: Normal breath sounds  No wheezing or rhonchi  Abdominal:      Tenderness: There is abdominal tenderness  Comments: Red, indurated, erythematous   Musculoskeletal:      Right lower leg: No edema  Left lower leg: No edema  Skin:     Capillary Refill: Capillary refill takes less than 2 seconds  Findings: Lesion present  Comments: Panus indurated, erythematous   Neurological:      Mental Status: He is alert and oriented to person, place, and time           Lab Results:   Results for orders placed or performed during the hospital encounter of 02/22/21   CBC and differential   Result Value Ref Range    WBC 20 76 (H) 4 31 - 10 16 Thousand/uL    RBC 4 41 3 88 - 5 62 Million/uL    Hemoglobin 12 1 12 0 - 17 0 g/dL    Hematocrit 39 1 36 5 - 49 3 %    MCV 89 82 - 98 fL    MCH 27 4 26 8 - 34 3 pg    MCHC 30 9 (L) 31 4 - 37 4 g/dL    RDW 13 3 11 6 - 15 1 %    MPV 9 2 8 9 - 12 7 fL    Platelets 684 (H) 120 - 390 Thousands/uL    nRBC 0 /100 WBCs    Neutrophils Relative 84 (H) 43 - 75 %    Immat GRANS % 1 0 - 2 %    Lymphocytes Relative 7 (L) 14 - 44 %    Monocytes Relative 7 4 - 12 %    Eosinophils Relative 1 0 - 6 %    Basophils Relative 0 0 - 1 %    Neutrophils Absolute 17 64 (H) 1 85 - 7 62 Thousands/µL    Immature Grans Absolute 0 15 0 00 - 0 20 Thousand/uL    Lymphocytes Absolute 1 35 0 60 - 4 47 Thousands/µL    Monocytes Absolute 1 46 (H) 0 17 - 1 22 Thousand/µL    Eosinophils Absolute 0 11 0 00 - 0 61 Thousand/µL    Basophils Absolute 0 05 0 00 - 0 10 Thousands/µL   Protime-INR   Result Value Ref Range    Protime 14 5 11 6 - 14 5 seconds    INR 1 14 0 84 - 1 19   APTT   Result Value Ref Range    PTT 34 23 - 37 seconds Comprehensive metabolic panel   Result Value Ref Range    Sodium 129 (L) 136 - 145 mmol/L    Potassium 4 2 3 5 - 5 3 mmol/L    Chloride 93 (L) 100 - 108 mmol/L    CO2 28 21 - 32 mmol/L    ANION GAP 8 4 - 13 mmol/L    BUN 33 (H) 5 - 25 mg/dL    Creatinine 1 97 (H) 0 60 - 1 30 mg/dL    Glucose 104 65 - 140 mg/dL    Calcium 8 9 8 3 - 10 1 mg/dL    Corrected Calcium 10 1 8 3 - 10 1 mg/dL    AST 16 5 - 45 U/L    ALT 19 12 - 78 U/L    Alkaline Phosphatase 86 46 - 116 U/L    Total Protein 8 8 (H) 6 4 - 8 2 g/dL    Albumin 2 5 (L) 3 5 - 5 0 g/dL    Total Bilirubin 0 70 0 20 - 1 00 mg/dL    eGFR 42 ml/min/1 73sq m   Troponin I   Result Value Ref Range    Troponin I <0 02 <=0 04 ng/mL   Lactic acid   Result Value Ref Range    LACTIC ACID 0 9 0 5 - 2 0 mmol/L       Imaging:   CT abdomen pelvis with contrast   Final Result      3 6 cm superficial subcutaneous abscess at the inferior margin of the panniculus  I personally discussed this study with Yaneth Lopez on 2/22/2021 at 12:24 PM                         Workstation performed: [id number]             EKG, Pathology, and Other Studies: I have personally reviewed pertinent reports  Code Status: Prior  Advance Directive and Living Will:      Power of :    POLST:      Counseling / Coordination of Care  Total floor / unit time spent today 45 minutes  Greater than 50% of total time was spent with the patient and / or family counseling and / or coordination of care

## 2021-02-22 NOTE — ASSESSMENT & PLAN NOTE
On admission patient's creatinine 1 97, baseline around 1   Patient BUN 33 ratio 16 7     · Hold lisinopril in setting of CLOVIS  · IVF with NS  · CLOVIS resolved, creatinine decreased to 1 04  · Resume lisinopril on discharge

## 2021-02-22 NOTE — ASSESSMENT & PLAN NOTE
Patient history of cardiac catheterization x2 in 12/01/2020 PCI mid RCA 80% lesion  In 2010 PCI of LCX  Patient denies chest pain on admission, troponin negative x1  At home patient takes Aspirin 81 mg qd, Atorvastatin 40 mg qd, Lisinopril 10 mg, Coreg 3 125 mg, Brilinta 90 mg Q12    · Continue patient's Aspirin 81 mg qd, Atorvastatin 40 mg qd, Coreg 3 125 mg, Brilinta 90 mg Q12  · Hold lisinopril in setting of CLOVIS

## 2021-02-22 NOTE — ASSESSMENT & PLAN NOTE
Abdominal wall skin abscess of panus  In ED patient started on clindamycin 600 mg, I&D attempted  Wound cultures sent  · Vancomycin renal dose given CLOVIS  · Follow-up wound cultures  · Culture grew Beta Hemolytic Streptococcus  · Pain control with acetaminophen 650 mg q 6 scheduled, tramadol 50 mg Q 4 hour p r n  Moderate pain, oxycodone 5 mg Q 4 hour p r n  Severe pain, dilaudid 0 2mg q3 hour p r n  Breakthrough pain  · Surgery consulted, appreciate ongoing recommendations

## 2021-02-23 LAB
ALBUMIN SERPL BCP-MCNC: 2.1 G/DL (ref 3.5–5)
ALP SERPL-CCNC: 82 U/L (ref 46–116)
ALT SERPL W P-5'-P-CCNC: 13 U/L (ref 12–78)
ANION GAP SERPL CALCULATED.3IONS-SCNC: 10 MMOL/L (ref 4–13)
AST SERPL W P-5'-P-CCNC: 17 U/L (ref 5–45)
BASOPHILS # BLD AUTO: 0.03 THOUSANDS/ΜL (ref 0–0.1)
BASOPHILS NFR BLD AUTO: 0 % (ref 0–1)
BILIRUB SERPL-MCNC: 0.4 MG/DL (ref 0.2–1)
BUN SERPL-MCNC: 28 MG/DL (ref 5–25)
CALCIUM ALBUM COR SERPL-MCNC: 9.6 MG/DL (ref 8.3–10.1)
CALCIUM SERPL-MCNC: 8.1 MG/DL (ref 8.3–10.1)
CHLORIDE SERPL-SCNC: 100 MMOL/L (ref 100–108)
CO2 SERPL-SCNC: 23 MMOL/L (ref 21–32)
CREAT SERPL-MCNC: 1.41 MG/DL (ref 0.6–1.3)
EOSINOPHIL # BLD AUTO: 0.29 THOUSAND/ΜL (ref 0–0.61)
EOSINOPHIL NFR BLD AUTO: 2 % (ref 0–6)
ERYTHROCYTE [DISTWIDTH] IN BLOOD BY AUTOMATED COUNT: 13.2 % (ref 11.6–15.1)
GFR SERPL CREATININE-BSD FRML MDRD: 62 ML/MIN/1.73SQ M
GLUCOSE P FAST SERPL-MCNC: 85 MG/DL (ref 65–99)
GLUCOSE SERPL-MCNC: 137 MG/DL (ref 65–140)
GLUCOSE SERPL-MCNC: 172 MG/DL (ref 65–140)
GLUCOSE SERPL-MCNC: 85 MG/DL (ref 65–140)
GLUCOSE SERPL-MCNC: 91 MG/DL (ref 65–140)
GLUCOSE SERPL-MCNC: 97 MG/DL (ref 65–140)
HCT VFR BLD AUTO: 33.2 % (ref 36.5–49.3)
HGB BLD-MCNC: 10.4 G/DL (ref 12–17)
IMM GRANULOCYTES # BLD AUTO: 0.07 THOUSAND/UL (ref 0–0.2)
IMM GRANULOCYTES NFR BLD AUTO: 1 % (ref 0–2)
LYMPHOCYTES # BLD AUTO: 1.71 THOUSANDS/ΜL (ref 0.6–4.47)
LYMPHOCYTES NFR BLD AUTO: 14 % (ref 14–44)
MAGNESIUM SERPL-MCNC: 2.1 MG/DL (ref 1.6–2.6)
MCH RBC QN AUTO: 27.7 PG (ref 26.8–34.3)
MCHC RBC AUTO-ENTMCNC: 31.3 G/DL (ref 31.4–37.4)
MCV RBC AUTO: 89 FL (ref 82–98)
MONOCYTES # BLD AUTO: 1.12 THOUSAND/ΜL (ref 0.17–1.22)
MONOCYTES NFR BLD AUTO: 9 % (ref 4–12)
NEUTROPHILS # BLD AUTO: 8.77 THOUSANDS/ΜL (ref 1.85–7.62)
NEUTS SEG NFR BLD AUTO: 74 % (ref 43–75)
NRBC BLD AUTO-RTO: 0 /100 WBCS
PHOSPHATE SERPL-MCNC: 3.9 MG/DL (ref 2.7–4.5)
PLATELET # BLD AUTO: 378 THOUSANDS/UL (ref 149–390)
PMV BLD AUTO: 9.4 FL (ref 8.9–12.7)
POTASSIUM SERPL-SCNC: 3.7 MMOL/L (ref 3.5–5.3)
PROT SERPL-MCNC: 7.7 G/DL (ref 6.4–8.2)
RBC # BLD AUTO: 3.75 MILLION/UL (ref 3.88–5.62)
SODIUM SERPL-SCNC: 133 MMOL/L (ref 136–145)
WBC # BLD AUTO: 11.99 THOUSAND/UL (ref 4.31–10.16)

## 2021-02-23 PROCEDURE — 83735 ASSAY OF MAGNESIUM: CPT | Performed by: STUDENT IN AN ORGANIZED HEALTH CARE EDUCATION/TRAINING PROGRAM

## 2021-02-23 PROCEDURE — 99221 1ST HOSP IP/OBS SF/LOW 40: CPT | Performed by: SURGERY

## 2021-02-23 PROCEDURE — 80053 COMPREHEN METABOLIC PANEL: CPT | Performed by: STUDENT IN AN ORGANIZED HEALTH CARE EDUCATION/TRAINING PROGRAM

## 2021-02-23 PROCEDURE — 99231 SBSQ HOSP IP/OBS SF/LOW 25: CPT | Performed by: FAMILY MEDICINE

## 2021-02-23 PROCEDURE — 85025 COMPLETE CBC W/AUTO DIFF WBC: CPT | Performed by: STUDENT IN AN ORGANIZED HEALTH CARE EDUCATION/TRAINING PROGRAM

## 2021-02-23 PROCEDURE — 82948 REAGENT STRIP/BLOOD GLUCOSE: CPT

## 2021-02-23 PROCEDURE — 84100 ASSAY OF PHOSPHORUS: CPT | Performed by: STUDENT IN AN ORGANIZED HEALTH CARE EDUCATION/TRAINING PROGRAM

## 2021-02-23 RX ORDER — OXYCODONE HYDROCHLORIDE AND ACETAMINOPHEN 5; 325 MG/1; MG/1
1 TABLET ORAL ONCE AS NEEDED
Status: COMPLETED | OUTPATIENT
Start: 2021-02-23 | End: 2021-02-23

## 2021-02-23 RX ORDER — OXYCODONE HYDROCHLORIDE AND ACETAMINOPHEN 5; 325 MG/1; MG/1
1 TABLET ORAL ONCE AS NEEDED
Status: DISCONTINUED | OUTPATIENT
Start: 2021-02-23 | End: 2021-02-23

## 2021-02-23 RX ORDER — TRAMADOL HYDROCHLORIDE 50 MG/1
50 TABLET ORAL EVERY 4 HOURS PRN
Status: DISCONTINUED | OUTPATIENT
Start: 2021-02-23 | End: 2021-02-24 | Stop reason: HOSPADM

## 2021-02-23 RX ORDER — TRAMADOL HYDROCHLORIDE 50 MG/1
50 TABLET ORAL EVERY 6 HOURS SCHEDULED
Status: DISCONTINUED | OUTPATIENT
Start: 2021-02-23 | End: 2021-02-23

## 2021-02-23 RX ORDER — HYDROMORPHONE HCL/PF 1 MG/ML
0.2 SYRINGE (ML) INJECTION
Status: DISCONTINUED | OUTPATIENT
Start: 2021-02-23 | End: 2021-02-24 | Stop reason: HOSPADM

## 2021-02-23 RX ORDER — ACETAMINOPHEN 325 MG/1
650 TABLET ORAL EVERY 6 HOURS SCHEDULED
Status: DISCONTINUED | OUTPATIENT
Start: 2021-02-23 | End: 2021-02-24 | Stop reason: HOSPADM

## 2021-02-23 RX ORDER — OXYCODONE HYDROCHLORIDE 5 MG/1
5 TABLET ORAL EVERY 4 HOURS PRN
Status: DISCONTINUED | OUTPATIENT
Start: 2021-02-23 | End: 2021-02-24 | Stop reason: HOSPADM

## 2021-02-23 RX ADMIN — ASPIRIN 81 MG: 81 TABLET, COATED ORAL at 10:16

## 2021-02-23 RX ADMIN — SODIUM CHLORIDE 125 ML/HR: 0.9 INJECTION, SOLUTION INTRAVENOUS at 02:24

## 2021-02-23 RX ADMIN — CARVEDILOL 3.12 MG: 3.12 TABLET, FILM COATED ORAL at 16:52

## 2021-02-23 RX ADMIN — OXYCODONE HYDROCHLORIDE 5 MG: 5 TABLET ORAL at 21:28

## 2021-02-23 RX ADMIN — OXYCODONE HYDROCHLORIDE 5 MG: 5 TABLET ORAL at 16:59

## 2021-02-23 RX ADMIN — TRAMADOL HYDROCHLORIDE 50 MG: 50 TABLET, FILM COATED ORAL at 11:51

## 2021-02-23 RX ADMIN — ATORVASTATIN CALCIUM 40 MG: 40 TABLET, FILM COATED ORAL at 16:52

## 2021-02-23 RX ADMIN — TICAGRELOR 90 MG: 90 TABLET ORAL at 10:16

## 2021-02-23 RX ADMIN — ENOXAPARIN SODIUM 40 MG: 40 INJECTION SUBCUTANEOUS at 10:16

## 2021-02-23 RX ADMIN — VANCOMYCIN HYDROCHLORIDE 1750 MG: 10 INJECTION, POWDER, LYOPHILIZED, FOR SOLUTION INTRAVENOUS at 04:50

## 2021-02-23 RX ADMIN — HYDROMORPHONE HYDROCHLORIDE 0.2 MG: 1 INJECTION, SOLUTION INTRAMUSCULAR; INTRAVENOUS; SUBCUTANEOUS at 22:15

## 2021-02-23 RX ADMIN — OXYCODONE HYDROCHLORIDE AND ACETAMINOPHEN 1 TABLET: 5; 325 TABLET ORAL at 04:50

## 2021-02-23 RX ADMIN — INSULIN LISPRO 1 UNITS: 100 INJECTION, SOLUTION INTRAVENOUS; SUBCUTANEOUS at 11:51

## 2021-02-23 RX ADMIN — ACETAMINOPHEN 650 MG: 325 TABLET, FILM COATED ORAL at 17:41

## 2021-02-23 RX ADMIN — AMLODIPINE BESYLATE 10 MG: 10 TABLET ORAL at 10:16

## 2021-02-23 RX ADMIN — CARVEDILOL 3.12 MG: 3.12 TABLET, FILM COATED ORAL at 10:17

## 2021-02-23 RX ADMIN — VANCOMYCIN HYDROCHLORIDE 1750 MG: 10 INJECTION, POWDER, LYOPHILIZED, FOR SOLUTION INTRAVENOUS at 16:52

## 2021-02-23 RX ADMIN — SODIUM CHLORIDE 125 ML/HR: 0.9 INJECTION, SOLUTION INTRAVENOUS at 21:44

## 2021-02-23 RX ADMIN — HYDROMORPHONE HYDROCHLORIDE 0.2 MG: 1 INJECTION, SOLUTION INTRAMUSCULAR; INTRAVENOUS; SUBCUTANEOUS at 13:50

## 2021-02-23 RX ADMIN — TICAGRELOR 90 MG: 90 TABLET ORAL at 21:27

## 2021-02-23 RX ADMIN — HYDROMORPHONE HYDROCHLORIDE 0.2 MG: 1 INJECTION, SOLUTION INTRAMUSCULAR; INTRAVENOUS; SUBCUTANEOUS at 18:59

## 2021-02-23 NOTE — PLAN OF CARE
Problem: PAIN - ADULT  Goal: Verbalizes/displays adequate comfort level or baseline comfort level  Description: Interventions:  - Encourage patient to monitor pain and request assistance  - Assess pain using appropriate pain scale-numeric  - Administer analgesics based on type and severity of pain and evaluate response  - Implement non-pharmacological measures as appropriate and evaluate response  - Consider cultural and social influences on pain and pain management  - Notify physician/advanced practitioner if interventions unsuccessful or patient reports new pain  Outcome: Progressing     Problem: INFECTION - ADULT  Goal: Absence or prevention of progression during hospitalization  Description: INTERVENTIONS:  - Assess and monitor for signs and symptoms of infection  - Monitor lab/diagnostic results  - Monitor all insertion sites, iv  - Harmony appropriate cooling/warming therapies per order  - Administer medications as ordered  - Instruct and encourage patient and family to use good hand hygiene technique  Outcome: Progressing

## 2021-02-23 NOTE — CONSULTS
Consultation - General Surgery   Collins Henao 45 y o  male MRN: [id number]  Unit/Bed#: [address] [place] [address] Encounter: [id number]    Assessment/Plan     Assessment:  · Abdominal pannus abscess --  superficial fluid collection in subcutaneous tissue seen on CT a/p, s/p I&D in the ED, wound culture preliminary result: gram-positive cocci in pairs and Beta hemolytic strep, currently on vancomycin, WBC count improving 20>12  · Hidradenitis suppurativa in groin  · Left groin mass         Plan:  · No need for further surgical intervention  · Daily wound packing changes  Discussed with patient need for having someone at home to help him   · Ordered PRN analgesics  · antibiotics per primary team  · Provided patient education about hidradenitis, prognosis, risks of intervention, etc  · Follow up with Dr Erika Her for left groin mass excision        History of Present Illness     HPI:  Collins Henao is a 45 y o  male with history of obesity, non insulin-dependent type 2 DM, hidradenitis suppurativa, current every day smoker, CAD with recent PCI of mid RCA in December 2020, who presents with abdominal wall abscess  Patient reports began to notice some irritation of his lower abdomen and assumed it was just skin irritation from rubbing against his pants  The pain began to worsen over the weekend and then yesterday patient experienced chills, nausea, vomiting, diaphoresis  Patient seen in the past by general surgery for hidradenitis suppurativa and pilonidal abscess, and more recently for left groin mass  Hidradenitis lesions continue to drain daily according to patient  He denies any history of MRSA  Denies any history of hidradenitis lesions in the axilla  According to chart review it is thought patient's left groin mass is from lymphedema  Last HgbA1C 8 4 in november 2020   He's currently on dual antiplatelets - aspirin and Brilinta           Inpatient consult to Acute Care Surgery  Consult performed by: Jonah Meraz AUSTIN Humphrey  Consult ordered by: Ramona Avila MD          Review of Systems   Constitutional: Positive for chills and fatigue  Negative for appetite change and fever  HENT: Negative for congestion, rhinorrhea, sneezing, sore throat and trouble swallowing  Respiratory: Negative for cough, chest tightness, shortness of breath and wheezing  Cardiovascular: Negative for chest pain, palpitations and leg swelling  Gastrointestinal: Positive for nausea  Negative for abdominal pain, blood in stool and diarrhea  Genitourinary: Negative for difficulty urinating, dysuria, flank pain and hematuria  Skin: Positive for rash and wound  Tenderness to palpation of right pannus   Neurological: Positive for headaches  Negative for dizziness, syncope, weakness and light-headedness         Historical Information   Past Medical History:   Diagnosis Date    Coronary artery disease     [facility] mellitus ([facility] )     Heart disease     CAD   s/p ptca with 1 stent 2012    Hyperlipidemia     Hypertension     MI (myocardial infarction) ([facility] )     " silent " M I  in the past    Myocardial infarction ([place] [address] )     Obesity     Pilonidal cyst      Past Surgical History:   Procedure Laterality Date    CORONARY ANGIOPLASTY WITH STENT PLACEMENT      INCISION AND DRAINAGE OF WOUND N/A 1/24/2017    Procedure: INCISION AND DRAINAGE (I&D) BUTTOCK, PILONIDAL CYST;  Surgeon: Ron Chao MD;  Location: [address] [place] [address];  Service:    [facility] LEG SURGERY Left     I&D of left leg 2012     Social History   Social History     Substance and Sexual Activity   Alcohol Use Not Currently    Comment: rarely     Social History     Substance and Sexual Activity   Drug Use No     E-Cigarette/Vaping    E-Cigarette Use Never User      E-Cigarette/Vaping Substances    Nicotine No     THC No     CBD No     Flavoring No     Other No     Unknown No      Social History     Tobacco Use   Smoking Status Current Every Day Smoker    Packs/day: 1 00    Years: 20 00  Pack years: 20 00    Types: Cigarettes   Smokeless Tobacco Never Used     Family History: non-contributory    Meds/Allergies   all current active meds have been reviewed  Allergies   Allergen Reactions    Amoxicillin        Objective   First Vitals:   Blood Pressure: 136/62 (02/22/21 0849)  Pulse: 74 (02/22/21 0849)  Temperature: 97 6 °F (36 4 °C) (02/22/21 0849)  Temp Source: Tympanic (02/22/21 0849)  Respirations: 16 (02/22/21 0849)  Height: 5' 9" (175 3 cm) (02/22/21 1600)  Weight - Scale: (!) 168 kg (370 lb) (02/22/21 1600)  SpO2: 97 % (02/22/21 0849)    Current Vitals:   Blood Pressure: 123/58 (02/23/21 0738)  Pulse: 69 (02/23/21 0738)  Temperature: 97 8 °F (36 6 °C) (02/23/21 0738)  Temp Source: Oral (02/23/21 0738)  Respirations: 18 (02/23/21 0738)  Height: 5' 9" (175 3 cm) (02/22/21 1600)  Weight - Scale: (!) 168 kg (370 lb) (02/22/21 1600)  SpO2: 95 % (02/23/21 0738)      Intake/Output Summary (Last 24 hours) at 2/23/2021 1308  Last data filed at 2/23/2021 0224  Gross per 24 hour   Intake 2000 ml   Output --   Net 2000 ml       Invasive Devices     Peripheral Intravenous Line            Peripheral IV 02/23/21 Dorsal (posterior); Right Forearm less than 1 day                Physical Exam  Vitals signs reviewed  Constitutional:       General: He is awake  He is not in acute distress  Appearance: Normal appearance  He is well-developed  He is obese  He is not toxic-appearing or diaphoretic  Interventions: He is not intubated  HENT:      Head: Normocephalic and atraumatic  Not macrocephalic and not microcephalic  No raccoon eyes, Shaffer's sign, right periorbital erythema or left periorbital erythema  Right Ear: Hearing and external ear normal       Left Ear: Hearing and external ear normal       Nose: Nose normal    Eyes:      General: No scleral icterus  Right eye: No discharge  Left eye: No discharge        Conjunctiva/sclera: Conjunctivae normal       Right eye: Right conjunctiva is not injected  No hemorrhage  Left eye: Left conjunctiva is not injected  No hemorrhage  Pupils: Pupils are equal, round, and reactive to light  Cardiovascular:      Rate and Rhythm: Normal rate and regular rhythm  Pulses:           Radial pulses are 2+ on the right side and 2+ on the left side  Pulmonary:      Effort: Pulmonary effort is normal  No tachypnea, bradypnea or respiratory distress  He is not intubated  Breath sounds: Normal breath sounds  No stridor  No decreased breath sounds, wheezing or rhonchi  Abdominal:      General: Bowel sounds are normal  There is no distension  Palpations: Abdomen is soft  Abdomen is not rigid  Tenderness: There is no guarding or rebound  Hernia: No hernia is present  Skin:     General: Skin is warm and dry  Capillary Refill: Capillary refill takes less than 2 seconds  Coloration: Skin is not cyanotic, jaundiced or mottled  Findings: Abscess and erythema present  Neurological:      General: No focal deficit present  Mental Status: He is alert and oriented to person, place, and time  He is not disoriented  GCS: GCS eye subscore is 4  GCS verbal subscore is 5  GCS motor subscore is 6  Cranial Nerves: Cranial nerves are intact  No cranial nerve deficit  Motor: Motor function is intact  Psychiatric:         Attention and Perception: Attention normal          Mood and Affect: Mood normal          Speech: Speech normal          Behavior: Behavior normal  Behavior is cooperative  Lab Results:   I have personally reviewed pertinent lab results    , CBC:   Lab Results   Component Value Date    WBC 11 99 (H) 02/23/2021    HGB 10 4 (L) 02/23/2021    HCT 33 2 (L) 02/23/2021    MCV 89 02/23/2021     02/23/2021    MCH 27 7 02/23/2021    MCHC 31 3 (L) 02/23/2021    RDW 13 2 02/23/2021    MPV 9 4 02/23/2021    NRBC 0 02/23/2021   , CMP:   Lab Results   Component Value Date SODIUM 133 (L) 02/23/2021    K 3 7 02/23/2021     02/23/2021    CO2 23 02/23/2021    BUN 28 (H) 02/23/2021    CREATININE 1 41 (H) 02/23/2021    CALCIUM 8 1 (L) 02/23/2021    AST 17 02/23/2021    ALT 13 02/23/2021    ALKPHOS 82 02/23/2021    EGFR 62 02/23/2021   , Coagulation: No results found for: PT, INR, APTT  Imaging: I have personally reviewed pertinent reports  and I have personally reviewed pertinent films in PACS  EKG, Pathology, and Other Studies: I have personally reviewed pertinent reports  Counseling / Coordination of Care  Total floor / unit time spent today 50 minutes  Greater than 50% of total time was spent with the patient and / or family counseling and / or coordination of care  A description of the counseling / coordination of care: Obtaining patient history, performing physical exam, reviewing pertinent labs and imaging, wound care, discussed management with attending physician  Tereso Leblanc

## 2021-02-23 NOTE — UTILIZATION REVIEW
Initial Clinical Review  PATIENT WAS ADMITTED AS OBSERVATION STATUS 2/22/21 CONVERTED TO INPATIENT ADMISSION AS NEEDING >2MN STAY FOR CONTINUED IV ABX VANCOMYCIN FOR ABDOMINAL WALL ABSCESS S/P I&D PENDING CULTURES    Admission: Date/Time/Statement:   Admission Orders (From admission, onward)     Ordered        02/23/21 1631  Inpatient Admission  Once                   Orders Placed This Encounter   Procedures    Inpatient Admission     Standing Status:   Standing     Number of Occurrences:   1     Order Specific Question:   Level of Care     Answer:   Med Surg [16]     Order Specific Question:   Estimated length of stay     Answer:   More than 2 Midnights     Order Specific Question:   Certification     Answer:   I certify that inpatient services are medically necessary for this patient for a duration of greater than two midnights  See H&P and MD Progress Notes for additional information about the patient's course of treatment  ED Arrival Information     Expected Arrival Acuity Means of Arrival Escorted By Service Admission Type    - 2/22/2021 08:36 Urgent Walk-In Self General Medicine Urgent    Arrival Complaint    abdominal pain        Chief Complaint   Patient presents with    Skin Irritation     pt has lower abd redness/ skin irritation- pt has had multiple skin infections in past; took advil this am- 9/10 pain     Assessment/Plan:   Assessment:40 y o  male with PMH of DM , CAD, HTN, hyperlipidemia who presents with 2 day history of abdominal wound  Patient reports pannus has become more red, swollen, and painful  Reports subjective fever and malaise  Last night had episode of vomiting  On day of admission patient felt light headed and sweaty, prompting him to go to ED for evaluation  In ED patient found to be hyponatremic with sodium of 129  He denies chest pain, shortness of breath, diarrhea, constipation, headache, dizziness, weakness, leg pain leg swelling    * Skin abscess  Assessment & Plan  Abdominal wall skin abscess of panus  In ED patient started on clindamycin 600 mg, I&D attempted  Wound cultures sent  · Vancomycin renal dose given CLOVIS  · Follow-up wound cultures  CAD (coronary artery disease)  Assessment & Plan  Patient history of cardiac catheterization x2 in 12/01/2020 PCI mid RCA 80% lesion  In 2010 PCI of LCX  Patient denies chest pain on admission, troponin negative x1  At home patient takes Aspirin 81 mg qd, Atorvastatin 40 mg qd, Lisinopril 10 mg, Coreg 3 125 mg, Brilinta 90 mg Q12  · Continue patient's Aspirin 81 mg qd, Atorvastatin 40 mg qd, Coreg 3 125 mg, Brilinta 90 mg Q12  · Hold lisinopril in setting of CLOVIS  Hyponatremia  Assessment & Plan  On presentation Na 129  Patient does not take an diuretics  · Urine NA, Urine osmol  · Monitor daily  · IVF NS @ 125  CLOVIS (acute kidney injury) ([facility] )  Assessment & Plan  On admission patient's creatinine 1 97, baseline around 1  Patient BUN 33 ratio 16 7     · Hold lisinopril in setting of CLOVIS  · IVF with NS  DM type 2, uncontrolled, with neuropathy [facility])  Assessment & Plan  Lab Results   Component Value Date     HGBA1C 8 4 (H) 11/29/2020     Last A1c 11/29 8 4  At home patient takes glimepiride 4 mg Q a m , metformin 1000 mg b i d  · Hold patient's home medications  · ISS  HTN (hypertension)  Assessment & Plan  Patient's BP up to 140/63 in ED  Patient takes Norvasc 10 mg, Coreg 3 125 BID, and lisinopril 10 mg qd  · Continue patient's Norvasc and Coreg  · Hold lisinopril given CLOVIS  Dyslipidemia  Assessment & Plan  Last lipid panel 11/29/2020 , , HDL 29,   Patient takes atorvastatin 40 mg q d  at home  · Continue home medications     2/23/21 CONVERTED TO INPATIENT ADMISSION FOR CONTINUE IV VANCOMYCIN S/P I&D  SURGERY  46 yo M with presenting with abodminal panus abscess  Noticed some skin irritation that began to worsen over weekend   Yesterday patient with nausea and chills/fever      - morbid obesity   - CAD s/p MI, cardiac cath and PCI  - thigh mass  - DM2  - HTN  -HLD  1  Abdominal abscess MRSA culture (+)  Ct Scan reviewed, patient examined  Packing in place, no further purulence or fluid expelled   - continue packing changes daily for now   - continue antibiotic treatment for MRSA  - will follow   - strict glucose control   2  Thigh mass   - Dr Aureliano Romero [facility]  Patient with follow up in March  Patient to be 3 months s/p MI prior to any intervention   3  H/o MI   - continue home medications   4  DM   - strict glucose control   - goal glu <160   5   Morbid obesity   - patient would benefit from medical weight loss referal   - he is agreeable   - after 6 months of medical weight loss patient may be good candidate for surgical weight loss procedure      ED Triage Vitals   Temperature Pulse Respirations Blood Pressure SpO2   02/22/21 0849 02/22/21 0849 02/22/21 0849 02/22/21 0849 02/22/21 0849   97 6 °F (36 4 °C) 74 16 136/62 97 %      Temp Source Heart Rate Source Patient Position - Orthostatic VS BP Location FiO2 (%)   02/22/21 0849 02/22/21 1100 02/22/21 0849 02/22/21 0849 --   Tympanic Monitor Sitting Right arm       Pain Score       02/22/21 0849       9          Wt Readings from Last 1 Encounters:   02/22/21 (!) 168 kg (370 lb)     Additional Vital Signs:   02/23/21 07:38:56  97 8 °F (36 6 °C)  69  18  123/58  80  95 %  None (Room air)  Sitting   02/22/21 23:53:28  98 4 °F (36 9 °C)  --  12  102/58  73  --  --  --   02/22/21 1945  --  --  --  --  --  96 %  None (Room air)  --   02/22/21 19:18:19  --  --  --  102/41Abnormal   61  --  --  --   02/22/21 19:03:14  98 3 °F (36 8 °C)  68  18  150/52  --  96 %  --  --   02/22/21 1600  97 5 °F (36 4 °C)  73  18  117/56  --  94 %         Pertinent Labs/Diagnostic Test Results:   2/22 CT abdomen pelvis 3 6 cm superficial subcutaneous abscess at the inferior margin of the panniculus     Results from last 7 days   Lab Units 02/23/21  3689 02/22/21  1002   WBC Thousand/uL 11 99* 20 76*   HEMOGLOBIN g/dL 10 4* 12 1   HEMATOCRIT % 33 2* 39 1   PLATELETS Thousands/uL 378 431*   NEUTROS ABS Thousands/µL 8 77* 17 64*     Results from last 7 days   Lab Units 02/23/21  0506 02/22/21  1002   SODIUM mmol/L 133* 129*   POTASSIUM mmol/L 3 7 4 2   CHLORIDE mmol/L 100 93*   CO2 mmol/L 23 28   ANION GAP mmol/L 10 8   BUN mg/dL 28* 33*   CREATININE mg/dL 1 41* 1 97*   EGFR ml/min/1 73sq m 62 42   CALCIUM mg/dL 8 1* 8 9   MAGNESIUM mg/dL 2 1  --    PHOSPHORUS mg/dL 3 9  --      Results from last 7 days   Lab Units 02/23/21  0506 02/22/21  1002   AST U/L 17 16   ALT U/L 13 19   ALK PHOS U/L 82 86   TOTAL PROTEIN g/dL 7 7 8 8*   ALBUMIN g/dL 2 1* 2 5*   TOTAL BILIRUBIN mg/dL 0 40 0 70     Results from last 7 days   Lab Units 02/23/21  1615 02/23/21  1112 02/23/21  0708 02/22/21  2336 02/22/21  2242 02/22/21  1603   POC GLUCOSE mg/dl 97 172* 91 122 58* 53*     Results from last 7 days   Lab Units 02/23/21  0506 02/22/21  1002   GLUCOSE RANDOM mg/dL 85 104     Results from last 7 days   Lab Units 02/22/21  1002   TROPONIN I ng/mL <0 02     Results from last 7 days   Lab Units 02/22/21  1002   PROTIME seconds 14 5   INR  1 14   PTT seconds 34     Results from last 7 days   Lab Units 02/22/21  1002   LACTIC ACID mmol/L 0 9     Results from last 7 days   Lab Units 02/22/21  1438 02/22/21  1008 02/22/21  1003   BLOOD CULTURE   --  No Growth at 24 hrs  No Growth at 24 hrs     GRAM STAIN RESULT  1+ Gram positive cocci in pairs*  1+ Polys*  --   --    WOUND CULTURE  3+ Growth of Beta Hemolytic Streptococcus Group C*  --   --      ED Treatment:   Medication Administration from 02/22/2021 0836 to 02/22/2021 1518       Date/Time Order Dose Route Action Action by Comments     02/22/2021 1428 sodium chloride 0 9 % bolus 1,000 mL 0 mL Intravenous Stopped Edwardo Jacobson, [facility]      02/22/2021 1008 sodium chloride 0 9 % bolus 1,000 mL 1,000 mL Intravenous New Bag Joseph Mixon [place], RN      02/22/2021 1010 ondansetron (ZOFRAN) injection 4 mg 4 mg Intravenous Given Elainaa Deb, RN      02/22/2021 1114 iohexol (OMNIPAQUE) 350 MG/ML injection (SINGLE-DOSE) 100 mL 100 mL Intravenous Given Darrold Priestly      02/22/2021 1257 morphine (PF) 4 mg/mL injection 4 mg 4 mg Intravenous Given Barb Renetta, RN      02/22/2021 1428 clindamycin (CLEOCIN) IVPB (premix in dextrose) 600 mg 50 mL 0 mg Intravenous Stopped Barb Bunting, RN      02/22/2021 1259 clindamycin (CLEOCIN) IVPB (premix in dextrose) 600 mg 50 mL 600 mg Intravenous [facility]      02/22/2021 1301 lidocaine-epinephrine (XYLOCAINE/EPINEPHRINE) 1 %-1:100,000 injection 20 mL 20 mL Infiltration Given Barb Jackson, RN given by Yves GARCIAS     02/22/2021 1437 HYDROmorphone (DILAUDID) injection 0 5 mg 0 5 mg Intravenous Given Barb Renetta, RN         Past Medical History:   Diagnosis Date    Coronary artery disease     [facility] mellitus [facility]     Heart disease     CAD   s/p ptca with 1 stent 2012    Hidradenitis suppurativa     groin    Hyperlipidemia     Hypertension     MI (myocardial infarction) ([facility] )     " silent " M I  in the past    Myocardial infarction ([facility]     Nicotine dependence     Obesity     Pilonidal cyst      Present on Admission:   CAD (coronary artery disease)   DM type 2, uncontrolled, with neuropathy ([facility]   Dyslipidemia   HTN (hypertension)      Admitting Diagnosis: Hyponatremia [E87 1]  Abdominal wall abscess [L02 211]  Skin irritation [R23 8]  CLOVIS (acute kidney injury) ([facility] ) [N17 9]  Age/Sex: 45 y o  male  Admission Orders:  vanco trough   Na urine random  Osmolality urine  Wound care daily  Scheduled Medications:  acetaminophen, 650 mg, Oral, Q6H SHAZIA  amLODIPine, 10 mg, Oral, Daily  aspirin, 81 mg, Oral, Daily  atorvastatin, 40 mg, Oral, Daily With Dinner  carvedilol, 3 125 mg, Oral, BID With Meals  enoxaparin, 40 mg, Subcutaneous, Daily  insulin lispro, 1-6 Units, Subcutaneous, 4x Daily (AC & HS)  nicotine, 1 patch, Transdermal, Daily  ticagrelor, 90 mg, Oral, Q12H SHAZIA  vancomycin, 15 mg/kg (Adjusted), Intravenous, Q12H      Continuous IV Infusions:  sodium chloride, 125 mL/hr, Intravenous, Continuous      PRN Meds:  HYDROmorphone, 0 2 mg, Intravenous, Q3H PRN  ondansetron, 4 mg, Intravenous, Q6H PRN  oxyCODONE, 5 mg, Oral, Q4H PRN  traMADol, 50 mg, Oral, Q4H PRN        IP CONSULT TO PHARMACY  IP CONSULT TO ACUTE CARE SURGERY    Network Utilization Review Department  ATTENTION: Please call with any questions or concerns to [phone] and carefully listen to the prompts so that you are directed to the right person  All voicemails are confidential   Leela Coleman all requests for admission clinical reviews, approved or denied determinations and any other requests to dedicated fax number below belonging to the campus where the patient is receiving treatment   List of dedicated fax numbers for the Facilities:  [address] DENIALS (Administrative/Medical Necessity) [phone]   [address] (Maternity/NICU/Pediatrics) [phone]   [address] [place] [address] Dr Eugenie Gabriel 8150 (Chrissy Gandhi "Mary" 103) [address] [place] [address] [address]on [address] [place] [address] [phone]

## 2021-02-23 NOTE — PHYSICIAN ADVISOR
Current patient class: Inpatient  The patient is currently on Hospital Day: 2 at [address] [place] [address]        The patient was admitted to the hospital  on 2/23/21 at [id number] for the following diagnosis:  Hyponatremia [E87 1]  Abdominal wall abscess [L02 211]  Skin irritation [R23 8]  CLOVIS (acute kidney injury) ([facility] ) [N17 9]     After review of the relevant documentation, labs, vital signs and test results, the patient is most appropriate for OBSERVATION STATUS  Rationale is as follows: The patient is a 45 yrs   Male who presented to the ED at 2/22/2021  8:44 AM with a chief complaint of Skin Irritation (pt has lower abd redness/ skin irritation- pt has had multiple skin infections in past; took advil this am- 9/10 pain)   Patient was noted to have an abdominal abscess  Patient was packed in the emergency department after incision and drainage  Patient is currently on antibiotics with culture still pending  There is no evidence of any sepsis  The patient does have a history of a thigh abscess which is being followed by surgery in [place]  Surgery did evaluate the patient here there is nothing from the surgical standpoint to be done at this time  Await cultures  For now I would just continue with observation status however the patient needs stay beyond tomorrow then I would have a low threshold changing the patient to inpatient at that time      The patients vitals on arrival were   ED Triage Vitals   Temperature Pulse Respirations Blood Pressure SpO2   02/22/21 0849 02/22/21 0849 02/22/21 0849 02/22/21 0849 02/22/21 0849   97 6 °F (36 4 °C) 74 16 136/62 97 %      Temp Source Heart Rate Source Patient Position - Orthostatic VS BP Location FiO2 (%)   02/22/21 0849 02/22/21 1100 02/22/21 0849 02/22/21 0849 --   Tympanic Monitor Sitting Right arm       Pain Score       02/22/21 0849       9           Past Medical History:   Diagnosis Date    Coronary artery disease     [facility] mellitus ([facility]     Heart disease     CAD   s/p ptca with 1 stent 2012    Hidradenitis suppurativa     groin    Hyperlipidemia     Hypertension     [facility]     " silent " M I  in the past    Myocardial infarction ([facility] )     Nicotine dependence     Obesity     Pilonidal cyst      Past Surgical History:   Procedure Laterality Date    CORONARY ANGIOPLASTY WITH STENT PLACEMENT      INCISION AND DRAINAGE OF WOUND N/A 1/24/2017    Procedure: INCISION AND DRAINAGE (I&D) BUTTOCK, PILONIDAL CYST;  Surgeon: Tomeka Moon MD;  Location: [address] [place] [address];  Service:    Tony Norm LEG SURGERY Left     I&D of left leg 2012           Consults have been placed to:   IP CONSULT TO PHARMACY  IP CONSULT TO ACUTE CARE SURGERY    Vitals:    02/22/21 1945 02/22/21 2353 02/23/21 0738 02/23/21 1600   BP:  102/58 123/58 117/64   BP Location:   Right arm Right arm   Pulse:   69 68   Resp:  12 18 18   Temp:  98 4 °F (36 9 °C) 97 8 °F (36 6 °C) 98 2 °F (36 8 °C)   TempSrc:   Oral Oral   SpO2: 96%  95% 95%   Weight:       Height:           Most recent labs:    Recent Labs     02/22/21  1002 02/23/21  0506   WBC 20 76* 11 99*   HGB 12 1 10 4*   HCT 39 1 33 2*   * 378   K 4 2 3 7   CALCIUM 8 9 8 1*   BUN 33* 28*   CREATININE 1 97* 1 41*   INR 1 14  --    TROPONINI <0 02  --    AST 16 17   ALT 19 13   ALKPHOS 86 82       Scheduled Meds:  Current Facility-Administered Medications   Medication Dose Route Frequency Provider Last Rate    acetaminophen  650 mg Oral Q6H [facility] Tim Humphrey PA-C      amLODIPine  10 mg Oral Daily Marciano Yost MD      aspirin  81 mg Oral Daily Marciano Yost MD      atorvastatin  40 mg Oral Daily With Janice Lynn MD      carvedilol  3 125 mg Oral BID With Meals Marciano Yost MD      enoxaparin  40 mg Subcutaneous Daily Marciano Yost MD      HYDROmorphone  0 2 mg Intravenous Q3H PRN Tomas Xavier PA-C      insulin lispro  1-6 Units Subcutaneous 4x Daily (AC & HS) Emma Solano Ki Dumont MD      nicotine  1 patch Transdermal Daily Bari Stahl MD      ondansetron  4 mg Intravenous Q6H PRN Bari Stahl MD      oxyCODONE  5 mg Oral Q4H PRN Stanford Keene PA-C      sodium chloride  125 mL/hr Intravenous Continuous Bari Stahl  mL/hr (02/23/21 0224)    ticagrelor  90 mg Oral Q12H 304 Carlos Dillard MD      traMADol  50 mg Oral Q4H PRN Stanford Keene PA-C      vancomycin  15 mg/kg (Adjusted) Intravenous Q12H Bari Stahl MD 1,750 mg (02/23/21 0450)     Continuous Infusions:sodium chloride, 125 mL/hr, Last Rate: 125 mL/hr (02/23/21 0224)      PRN Meds:  HYDROmorphone    ondansetron    oxyCODONE    traMADol

## 2021-02-23 NOTE — PROGRESS NOTES
Vancomycin IV Pharmacy-to-Dose Consultation    Dana Farfan is a 45 y o  male who is currently receiving Vancomycin IV with management by the Pharmacy Consult service  Assessment/Plan:  The patient was reviewed  Renal function is stable and no signs or symptoms of nephrotoxicity and/or infusion reactions were documented in the chart  Based on todays assessment, continue current vancomycin (day # 2) dosing of 1750 mg IV q12h, with a plan for trough to be drawn at 1600 on 2/24/21  We will continue to follow the patients culture results and clinical progress daily      Jelly Lux, Pharmacist

## 2021-02-23 NOTE — PLAN OF CARE
Problem: PAIN - ADULT  Goal: Verbalizes/displays adequate comfort level or baseline comfort level  Description: Interventions:  - Encourage patient to monitor pain and request assistance  - Assess pain using appropriate pain scale-numeric  - Administer analgesics based on type and severity of pain and evaluate response  - Implement non-pharmacological measures as appropriate and evaluate response  - Consider cultural and social influences on pain and pain management  - Notify physician/advanced practitioner if interventions unsuccessful or patient reports new pain  Outcome: Progressing     Problem: INFECTION - ADULT  Goal: Absence or prevention of progression during hospitalization  Description: INTERVENTIONS:  - Assess and monitor for signs and symptoms of infection  - Monitor lab/diagnostic results  - Monitor all insertion sites, iv  - [place] appropriate cooling/warming therapies per order  - Administer medications as ordered  - Instruct and encourage patient and family to use good hand hygiene technique  Outcome: Progressing     Problem: SKIN/TISSUE INTEGRITY - ADULT  Goal: Skin integrity remains intact  Description: INTERVENTIONS  - Identify patients at risk for skin breakdown  - Assess and monitor skin integrity  - Assess and monitor nutrition and hydration status  - Monitor labs (i e  albumin)  - Assess for incontinence   - Turn and reposition patient  - Assist with mobility/ambulation  - Relieve pressure over bony prominences  - Avoid friction and shearing  - Provide appropriate hygiene as needed including keeping skin clean and dry  - Evaluate need for skin moisturizer/barrier cream  - Collaborate with interdisciplinary team (i e  Nutrition, Rehabilitation, etc )   - Patient/family teaching  Outcome: Progressing  Goal: Incision(s), wounds(s) or drain site(s) healing without S/S of infection  Description: INTERVENTIONS  - Assess and document risk factors for skin impairment   - Assess and document dressing, incision, wound bed, drain sites and surrounding tissue  - Consider nutrition services referral as needed  - Oral mucous membranes remain intact  - Provide patient/ family education  Outcome: Progressing

## 2021-02-23 NOTE — PROGRESS NOTES
[facility] Practice Progress Note - Macho Octavio 45 y o  male MRN: [id number]    Unit/Bed#: [address] [place] [address] Encounter: [id number]      Assessment/Plan:  * Skin abscess  Assessment & Plan  Abdominal wall skin abscess of panus  In ED patient started on clindamycin 600 mg, I&D attempted  Wound cultures sent  · Vancomycin renal dose given CLOVIS  · Follow-up wound cultures  · Pain control with tramadol q6h prn  · Surgery consulted, appreciate recommendations  CAD (coronary artery disease)  Assessment & Plan  Patient history of cardiac catheterization x2 in 12/01/2020 PCI mid RCA 80% lesion  In 2010 PCI of LCX  Patient denies chest pain on admission, troponin negative x1  At home patient takes Aspirin 81 mg qd, Atorvastatin 40 mg qd, Lisinopril 10 mg, Coreg 3 125 mg, Brilinta 90 mg Q12    · Continue patient's Aspirin 81 mg qd, Atorvastatin 40 mg qd, Coreg 3 125 mg, Brilinta 90 mg Q12  · Hold lisinopril in setting of CLOVIS  Hyponatremia  Assessment & Plan  On presentation Na 129  Patient does not take an diuretics  · Urine NA, Urine osmol  · Monitor daily  · IVF NS @ 125    CLOVIS (acute kidney injury) ([facility] [address] )  Assessment & Plan  On admission patient's creatinine 1 97, baseline around 1  Patient BUN 33 ratio 16 7     · Hold lisinopril in setting of CLOVIS  · IVF with NS    DM type 2, uncontrolled, with neuropathy [facility])  Assessment & Plan    Lab Results   Component Value Date    HGBA1C 8 4 (H) 11/29/2020     Last A1c 11/29 8 4  At home patient takes glimepiride 4 mg Q a m , metformin 1000 mg b i d  · Hold patient's home medications  · ISS        HTN (hypertension)  Assessment & Plan  Patient's BP up to 140/63 in ED  Patient takes Norvasc 10 mg, Coreg 3 125 BID, and lisinopril 10 mg qd  · Continue patient's Norvasc and Coreg  · Hold lisinopril given CLOVIS    Dyslipidemia  Assessment & Plan  Last lipid panel 11/29/2020 , , HDL 29,     Patient takes atorvastatin 40 mg q d  at home  · Continue home medications              Subjective:   Examined at bedside  Patient no acute distress  Patient does continue complain of abdominal pain related to the abscess drained in the ED  Wound cultures still pending  Patient currently denies any chest pain, shortness of breath, nausea, vomiting, constipation, diarrhea, eye pain, leg swelling  Patient states that he was experiencing significant abdominal pain overnight, but this was improved when he received Percocet  Patient believes that his pain is significantly improved since he was 1st noticed the pain prior to admission  Objective:     Vitals: Blood pressure 123/58, pulse 68, temperature 97 8 °F (36 6 °C), resp  rate 12, height 5' 9" (1 753 m), weight (!) 168 kg (370 lb), SpO2 96 %  ,Body mass index is 54 64 kg/m²  Wt Readings from Last 3 Encounters:   02/22/21 (!) 168 kg (370 lb)   01/26/21 (!) 169 kg (373 lb 8 oz)   01/14/21 (!) 170 kg (374 lb)       Intake/Output Summary (Last 24 hours) at 2/23/2021 1236  Last data filed at 2/23/2021 0224  Gross per 24 hour   Intake 2000 ml   Output --   Net 2000 ml       Physical Exam:   Physical Exam  Constitutional:       General: He is not in acute distress  Appearance: He is obese  HENT:      Head: Normocephalic and atraumatic  Nose: Congestion present  Mouth/Throat:      Mouth: Mucous membranes are moist       Pharynx: Oropharynx is clear  Cardiovascular:      Rate and Rhythm: Normal rate and regular rhythm  Pulses: Normal pulses  Heart sounds: Normal heart sounds  No murmur  No friction rub  No gallop  Pulmonary:      Effort: Pulmonary effort is normal  No respiratory distress  Breath sounds: Normal breath sounds  No wheezing or rhonchi  Abdominal:      Tenderness: There is abdominal tenderness  Comments: Red, indurated, erythematous   Musculoskeletal:      Right lower leg: No edema  Left lower leg: No edema     Skin:     Capillary Refill: Capillary refill takes less than 2 seconds  Findings: Lesion present  Comments: Panus indurated, erythematous  Wound packed, covered with gauze  Neurological:      Mental Status: He is alert and oriented to person, place, and time              Recent Results (from the past 24 hour(s))   Wound culture and Gram stain    Collection Time: 02/22/21  2:38 PM    Specimen: Abdominal; Wound   Result Value Ref Range    Gram Stain Result 1+ Gram positive cocci in pairs (A)     Gram Stain Result 1+ Polys (A)    Fingerstick Glucose (POCT)    Collection Time: 02/22/21  4:03 PM   Result Value Ref Range    POC Glucose 53 (L) 65 - 140 mg/dl   Fingerstick Glucose (POCT)    Collection Time: 02/22/21 10:42 PM   Result Value Ref Range    POC Glucose 58 (L) 65 - 140 mg/dl   Fingerstick Glucose (POCT)    Collection Time: 02/22/21 11:36 PM   Result Value Ref Range    POC Glucose 122 65 - 140 mg/dl   Comprehensive metabolic panel    Collection Time: 02/23/21  5:06 AM   Result Value Ref Range    Sodium 133 (L) 136 - 145 mmol/L    Potassium 3 7 3 5 - 5 3 mmol/L    Chloride 100 100 - 108 mmol/L    CO2 23 21 - 32 mmol/L    ANION GAP 10 4 - 13 mmol/L    BUN 28 (H) 5 - 25 mg/dL    Creatinine 1 41 (H) 0 60 - 1 30 mg/dL    Glucose 85 65 - 140 mg/dL    Glucose, Fasting 85 65 - 99 mg/dL    Calcium 8 1 (L) 8 3 - 10 1 mg/dL    Corrected Calcium 9 6 8 3 - 10 1 mg/dL    AST 17 5 - 45 U/L    ALT 13 12 - 78 U/L    Alkaline Phosphatase 82 46 - 116 U/L    Total Protein 7 7 6 4 - 8 2 g/dL    Albumin 2 1 (L) 3 5 - 5 0 g/dL    Total Bilirubin 0 40 0 20 - 1 00 mg/dL    eGFR 62 ml/min/1 73sq m   Magnesium    Collection Time: 02/23/21  5:06 AM   Result Value Ref Range    Magnesium 2 1 1 6 - 2 6 mg/dL   Phosphorus    Collection Time: 02/23/21  5:06 AM   Result Value Ref Range    Phosphorus 3 9 2 7 - 4 5 mg/dL   CBC and differential    Collection Time: 02/23/21  5:06 AM   Result Value Ref Range    WBC 11 99 (H) 4 31 - 10 16 Thousand/uL    RBC 3 75 (L) 3 88 - 5 62 Million/uL Hemoglobin 10 4 (L) 12 0 - 17 0 g/dL    Hematocrit 33 2 (L) 36 5 - 49 3 %    MCV 89 82 - 98 fL    MCH 27 7 26 8 - 34 3 pg    MCHC 31 3 (L) 31 4 - 37 4 g/dL    RDW 13 2 11 6 - 15 1 %    MPV 9 4 8 9 - 12 7 fL    Platelets 990 952 - 222 Thousands/uL    nRBC 0 /100 WBCs    Neutrophils Relative 74 43 - 75 %    Immat GRANS % 1 0 - 2 %    Lymphocytes Relative 14 14 - 44 %    Monocytes Relative 9 4 - 12 %    Eosinophils Relative 2 0 - 6 %    Basophils Relative 0 0 - 1 %    Neutrophils Absolute 8 77 (H) 1 85 - 7 62 Thousands/µL    Immature Grans Absolute 0 07 0 00 - 0 20 Thousand/uL    Lymphocytes Absolute 1 71 0 60 - 4 47 Thousands/µL    Monocytes Absolute 1 12 0 17 - 1 22 Thousand/µL    Eosinophils Absolute 0 29 0 00 - 0 61 Thousand/µL    Basophils Absolute 0 03 0 00 - 0 10 Thousands/µL   Fingerstick Glucose (POCT)    Collection Time: 02/23/21  7:08 AM   Result Value Ref Range    POC Glucose 91 65 - 140 mg/dl   Fingerstick Glucose (POCT)    Collection Time: 02/23/21 11:12 AM   Result Value Ref Range    POC Glucose 172 (H) 65 - 140 mg/dl       Current Facility-Administered Medications   Medication Dose Route Frequency Provider Last Rate Last Admin    acetaminophen (TYLENOL) tablet 650 mg  650 mg Oral Q6H PRN Ata Morales MD   650 mg at 02/22/21 1645    amLODIPine (NORVASC) tablet 10 mg  10 mg Oral Daily Oni Cohen MD   10 mg at 02/23/21 1016    aspirin (ECOTRIN LOW STRENGTH) EC tablet 81 mg  81 mg Oral Daily Ata Morales MD   81 mg at 02/23/21 1016    atorvastatin (LIPITOR) tablet 40 mg  40 mg Oral Daily With Leatha Whiteside MD   40 mg at 02/22/21 1623    carvedilol (COREG) tablet 3 125 mg  3 125 mg Oral BID With Meals Oni Cohen MD   3 125 mg at 02/23/21 1017    enoxaparin (LOVENOX) subcutaneous injection 40 mg  40 mg Subcutaneous Daily Oni Cohen MD   40 mg at 02/23/21 1016    insulin lispro (HumaLOG) 100 units/mL subcutaneous injection 1-6 Units  1-6 Units Subcutaneous 4x Daily [facility]) Alma Lafleur MD   1 Units at 02/23/21 1151    nicotine (NICODERM CQ) 21 mg/24 hr TD 24 hr patch 1 patch  1 patch Transdermal Daily Domonique Rivera MD        ondansetron [facility]) injection 4 mg  4 mg Intravenous Q6H PRN Domonique Rivera MD        sodium chloride 0 9 % infusion  125 mL/hr Intravenous Continuous Domonique Rivera  mL/hr at 02/23/21 0224 125 mL/hr at 02/23/21 0224    ticagrelor (BRILINTA) tablet 90 mg  90 mg Oral Q12H 304 Carlos Dillard MD   90 mg at 02/23/21 1016    traMADol (ULTRAM) tablet 50 mg  50 mg Oral Q6H [facility] Liane Peña MD   50 mg at 02/23/21 1151    vancomycin (VANCOCIN) 1,750 mg in sodium chloride 0 9 % 500 mL IVPB  15 mg/kg (Adjusted) Intravenous Q12H Domonique Rivera  mL/hr at 02/23/21 0450 1,750 mg at 02/23/21 0450       Invasive Devices     Peripheral Intravenous Line            Peripheral IV 02/23/21 Dorsal (posterior); Right Forearm less than 1 day                Lab, Imaging and other studies: I have personally reviewed pertinent reports      VTE Pharmacologic Prophylaxis: Enoxaparin (Lovenox)  VTE Mechanical Prophylaxis: sequential compression device    Domonique Rivera MD

## 2021-02-24 VITALS
RESPIRATION RATE: 18 BRPM | OXYGEN SATURATION: 94 % | WEIGHT: 315 LBS | DIASTOLIC BLOOD PRESSURE: 58 MMHG | TEMPERATURE: 97.9 F | BODY MASS INDEX: 46.65 KG/M2 | SYSTOLIC BLOOD PRESSURE: 126 MMHG | HEART RATE: 50 BPM | HEIGHT: 69 IN

## 2021-02-24 DIAGNOSIS — L02.211 CUTANEOUS ABSCESS OF ABDOMINAL WALL: Primary | ICD-10-CM

## 2021-02-24 PROBLEM — N17.9 AKI (ACUTE KIDNEY INJURY) (HCC): Status: RESOLVED | Noted: 2021-02-22 | Resolved: 2021-02-24

## 2021-02-24 PROBLEM — E87.1 HYPONATREMIA: Status: RESOLVED | Noted: 2021-02-22 | Resolved: 2021-02-24

## 2021-02-24 LAB
ALBUMIN SERPL BCP-MCNC: 2 G/DL (ref 3.5–5)
ALP SERPL-CCNC: 96 U/L (ref 46–116)
ALT SERPL W P-5'-P-CCNC: 21 U/L (ref 12–78)
ANION GAP SERPL CALCULATED.3IONS-SCNC: 10 MMOL/L (ref 4–13)
AST SERPL W P-5'-P-CCNC: 19 U/L (ref 5–45)
BASOPHILS # BLD AUTO: 0.04 THOUSANDS/ΜL (ref 0–0.1)
BASOPHILS NFR BLD AUTO: 1 % (ref 0–1)
BILIRUB SERPL-MCNC: 0.2 MG/DL (ref 0.2–1)
BUN SERPL-MCNC: 16 MG/DL (ref 5–25)
CALCIUM ALBUM COR SERPL-MCNC: 10.2 MG/DL (ref 8.3–10.1)
CALCIUM SERPL-MCNC: 8.6 MG/DL (ref 8.3–10.1)
CHLORIDE SERPL-SCNC: 102 MMOL/L (ref 100–108)
CO2 SERPL-SCNC: 24 MMOL/L (ref 21–32)
CREAT SERPL-MCNC: 1.05 MG/DL (ref 0.6–1.3)
EOSINOPHIL # BLD AUTO: 0.24 THOUSAND/ΜL (ref 0–0.61)
EOSINOPHIL NFR BLD AUTO: 3 % (ref 0–6)
ERYTHROCYTE [DISTWIDTH] IN BLOOD BY AUTOMATED COUNT: 13.2 % (ref 11.6–15.1)
GFR SERPL CREATININE-BSD FRML MDRD: 89 ML/MIN/1.73SQ M
GLUCOSE SERPL-MCNC: 125 MG/DL (ref 65–140)
GLUCOSE SERPL-MCNC: 126 MG/DL (ref 65–140)
GLUCOSE SERPL-MCNC: 183 MG/DL (ref 65–140)
HCT VFR BLD AUTO: 33.9 % (ref 36.5–49.3)
HGB BLD-MCNC: 10.4 G/DL (ref 12–17)
IMM GRANULOCYTES # BLD AUTO: 0.03 THOUSAND/UL (ref 0–0.2)
IMM GRANULOCYTES NFR BLD AUTO: 0 % (ref 0–2)
LYMPHOCYTES # BLD AUTO: 1.72 THOUSANDS/ΜL (ref 0.6–4.47)
LYMPHOCYTES NFR BLD AUTO: 24 % (ref 14–44)
MAGNESIUM SERPL-MCNC: 1.7 MG/DL (ref 1.6–2.6)
MCH RBC QN AUTO: 27 PG (ref 26.8–34.3)
MCHC RBC AUTO-ENTMCNC: 30.7 G/DL (ref 31.4–37.4)
MCV RBC AUTO: 88 FL (ref 82–98)
MONOCYTES # BLD AUTO: 0.66 THOUSAND/ΜL (ref 0.17–1.22)
MONOCYTES NFR BLD AUTO: 9 % (ref 4–12)
NEUTROPHILS # BLD AUTO: 4.36 THOUSANDS/ΜL (ref 1.85–7.62)
NEUTS SEG NFR BLD AUTO: 63 % (ref 43–75)
NRBC BLD AUTO-RTO: 0 /100 WBCS
PHOSPHATE SERPL-MCNC: 3.3 MG/DL (ref 2.7–4.5)
PLATELET # BLD AUTO: 374 THOUSANDS/UL (ref 149–390)
PMV BLD AUTO: 9.4 FL (ref 8.9–12.7)
POTASSIUM SERPL-SCNC: 4 MMOL/L (ref 3.5–5.3)
PROT SERPL-MCNC: 7.8 G/DL (ref 6.4–8.2)
RBC # BLD AUTO: 3.85 MILLION/UL (ref 3.88–5.62)
SODIUM SERPL-SCNC: 136 MMOL/L (ref 136–145)
WBC # BLD AUTO: 7.05 THOUSAND/UL (ref 4.31–10.16)

## 2021-02-24 PROCEDURE — 84100 ASSAY OF PHOSPHORUS: CPT | Performed by: STUDENT IN AN ORGANIZED HEALTH CARE EDUCATION/TRAINING PROGRAM

## 2021-02-24 PROCEDURE — NC001 PR NO CHARGE: Performed by: FAMILY MEDICINE

## 2021-02-24 PROCEDURE — 99232 SBSQ HOSP IP/OBS MODERATE 35: CPT | Performed by: SURGERY

## 2021-02-24 PROCEDURE — 85025 COMPLETE CBC W/AUTO DIFF WBC: CPT | Performed by: STUDENT IN AN ORGANIZED HEALTH CARE EDUCATION/TRAINING PROGRAM

## 2021-02-24 PROCEDURE — 83735 ASSAY OF MAGNESIUM: CPT | Performed by: STUDENT IN AN ORGANIZED HEALTH CARE EDUCATION/TRAINING PROGRAM

## 2021-02-24 PROCEDURE — 99231 SBSQ HOSP IP/OBS SF/LOW 25: CPT | Performed by: FAMILY MEDICINE

## 2021-02-24 PROCEDURE — 80053 COMPREHEN METABOLIC PANEL: CPT | Performed by: STUDENT IN AN ORGANIZED HEALTH CARE EDUCATION/TRAINING PROGRAM

## 2021-02-24 PROCEDURE — 82948 REAGENT STRIP/BLOOD GLUCOSE: CPT

## 2021-02-24 RX ORDER — DOXYCYCLINE HYCLATE 100 MG
100 TABLET ORAL 2 TIMES DAILY
Qty: 14 TABLET | Refills: 0 | Status: SHIPPED | OUTPATIENT
Start: 2021-02-24 | End: 2021-03-03

## 2021-02-24 RX ORDER — ACETAMINOPHEN 325 MG/1
650 TABLET ORAL EVERY 6 HOURS SCHEDULED
Qty: 40 EACH | Refills: 0 | Status: SHIPPED | OUTPATIENT
Start: 2021-02-24 | End: 2021-03-01

## 2021-02-24 RX ADMIN — ACETAMINOPHEN 650 MG: 325 TABLET, FILM COATED ORAL at 00:30

## 2021-02-24 RX ADMIN — ACETAMINOPHEN 650 MG: 325 TABLET, FILM COATED ORAL at 11:23

## 2021-02-24 RX ADMIN — OXYCODONE HYDROCHLORIDE 5 MG: 5 TABLET ORAL at 08:38

## 2021-02-24 RX ADMIN — OXYCODONE HYDROCHLORIDE 5 MG: 5 TABLET ORAL at 13:27

## 2021-02-24 RX ADMIN — ACETAMINOPHEN 650 MG: 325 TABLET, FILM COATED ORAL at 05:17

## 2021-02-24 RX ADMIN — ENOXAPARIN SODIUM 40 MG: 40 INJECTION SUBCUTANEOUS at 08:38

## 2021-02-24 RX ADMIN — ASPIRIN 81 MG: 81 TABLET, COATED ORAL at 08:38

## 2021-02-24 RX ADMIN — TICAGRELOR 90 MG: 90 TABLET ORAL at 08:38

## 2021-02-24 RX ADMIN — CARVEDILOL 3.12 MG: 3.12 TABLET, FILM COATED ORAL at 08:38

## 2021-02-24 RX ADMIN — OXYCODONE HYDROCHLORIDE 5 MG: 5 TABLET ORAL at 02:06

## 2021-02-24 RX ADMIN — INSULIN LISPRO 1 UNITS: 100 INJECTION, SOLUTION INTRAVENOUS; SUBCUTANEOUS at 11:23

## 2021-02-24 RX ADMIN — SODIUM CHLORIDE 125 ML/HR: 0.9 INJECTION, SOLUTION INTRAVENOUS at 09:36

## 2021-02-24 RX ADMIN — HYDROMORPHONE HYDROCHLORIDE 0.2 MG: 1 INJECTION, SOLUTION INTRAMUSCULAR; INTRAVENOUS; SUBCUTANEOUS at 05:17

## 2021-02-24 RX ADMIN — AMLODIPINE BESYLATE 10 MG: 10 TABLET ORAL at 08:38

## 2021-02-24 RX ADMIN — HYDROMORPHONE HYDROCHLORIDE 0.2 MG: 1 INJECTION, SOLUTION INTRAMUSCULAR; INTRAVENOUS; SUBCUTANEOUS at 09:47

## 2021-02-24 RX ADMIN — VANCOMYCIN HYDROCHLORIDE 1750 MG: 10 INJECTION, POWDER, LYOPHILIZED, FOR SOLUTION INTRAVENOUS at 05:18

## 2021-02-24 NOTE — NURSING NOTE
IV removed  No bleeding noted  Patient was ready for discharge  All belongings returned to patient  AVS reviewed with patient  No questions at this time  Patient left for discharge walking with primary RN  Steady gait noted

## 2021-02-24 NOTE — DISCHARGE INSTRUCTIONS
To schedule an appointment at the [address] [place] [address] Call:  [phone]    Abscess   WHAT YOU NEED TO KNOW:   What is an abscess? An abscess is an area under the skin where pus (infected fluid) collects  An abscess is often caused by bacteria, fungi or other germs that get into an open wound  You can get an abscess anywhere on your body  What increases my risk for an abscess? · An animal bite    · A foreign object lodged under your skin    · Heavy or frequent sweating    · A health problem, such as diabetes or obesity    · Injecting illegal drugs    What are the signs and symptoms of an abscess? You may have a swollen mass that is red and painful  Pus may leak out of the mass  The pus will be white or yellow and may smell bad  You may have redness and pain days before the mass appears  You may have a fever and chills if the infection spreads  How is an abscess diagnosed? Your healthcare provider will examine the area  He will check to see if your abscess is draining  A sample of fluid from your abscess may show what is causing your infection  How is an abscess treated? · Incision and drainage  is a procedure used to remove pus and fluid from the abscess  Your healthcare provider will make a cut in the abscess so it can drain  Then gauze will be put into the wound and it will be covered with a bandage  · Surgery  may be needed to remove your abscess  Your healthcare provider may do this if the abscess is on your hands or buttocks  Surgery can decrease the risk that the abscess will come back  What can I do to care for my self? · Apply a warm compress to your abscess  This will help it open and drain  Wet a washcloth in warm, but not hot, water  Apply the compress for 10 minutes  Repeat this 4 times each day  Do not  press on an abscess or try to open it with a needle  You may push the bacteria deeper or into your blood  · Do not share your clothes, towels, or sheets with anyone    This can spread the infection to others  · Wash your hands often  This can help prevent the spread of germs  Use soap and water or an alcohol-based hand rub  What can I do to care for my wound after it is drained? · Care for your wound as directed  If your healthcare provider says it is okay, carefully remove the bandage and gauze packing  You may need to soak the gauze to get it out of your wound  Clean your wound and the area around it as directed  Dry the area and put on new, clean bandages  Change your bandages when they get wet or dirty  · Ask your healthcare provider how to change the gauze in your wound  Keep track of how many pieces of gauze are placed inside the wound  Do not put too much packing in the wound  Do not pack the gauze too tightly in your wound  When should I seek immediate care? · The area around your abscess becomes very painful, warm, or has red streaks  · You have a fever and chills  · Your heart is beating faster than usual      · You feel faint or confused  When should I contact my healthcare provider? · Your abscess gets bigger  · Your abscess returns  · You have questions or concerns about your condition or care  CARE AGREEMENT:   You have the right to help plan your care  Learn about your health condition and how it may be treated  Discuss treatment options with your healthcare providers to decide what care you want to receive  You always have the right to refuse treatment  The above information is an  only  It is not intended as medical advice for individual conditions or treatments  Talk to your doctor, nurse or pharmacist before following any medical regimen to see if it is safe and effective for you  © Copyright [address] Information is for End User's use only and may not be sold, redistributed or otherwise used for commercial purposes   All illustrations and images included in CareNotes® are the copyrighted property of [employer]  or [facility]      How to Stop Smoking   WHAT YOU NEED TO KNOW:   You will improve your health and the health of others around you if you stop smoking  Your risk for heart and lung disease, cancer, stroke, heart attack, and vision problems will also decrease  Your adolescent can help prevent or stop harm to his or her brain or body  This will help him or her become a healthy adult  You can benefit from quitting no matter how long you have smoked  DISCHARGE INSTRUCTIONS:   Prepare to stop smoking:  Nicotine is a highly addictive drug found in cigarettes  Withdrawal symptoms can happen when you stop smoking and make it hard to quit  These include anxiety, depression, irritability, trouble sleeping, and increased appetite  You increase your chances of success if you prepare to quit  · Set a quit date  Soheila Alston a date that is within the next 2 weeks  Do not pick a day that you think may be stressful or busy  Write down the day or [place] it on your calender  · Tell friends and family that you plan to quit  Explain that you may have withdrawal symptoms when you try to quit  Ask them to support you  They may be able to encourage you and help reduce your stress to make it easier for you to quit  · Make a list of your reasons for quitting  Put the list somewhere you will see it every day, such as your refrigerator  You can look at the list when you have a craving  · Remove all tobacco and nicotine products from your home, car, and workplace  Also, remove anything else that will tempt you to smoke, such as lighters, matches, or ashtrays  Clean your car, home, and places at work that smell like smoke  The smell of smoke can trigger a craving  · Identify triggers that make you want to smoke  This may include activities, feelings, or people  Also write down 1 way you can deal with each of your triggers   For example, if you want to smoke as soon as you wake up, plan another activity during this time, such as exercise  · Make a plan for how you will quit  Learn about the tools that can help you quit, such as medicine, counseling, or nicotine replacement therapy  Choose at least 2 options to help you quit  · Help your adolescent make a plan to quit  The plan will be more successful if your adolescent makes his or her own decisions  Do not try to pressure him or her to quit immediately or in a certain way  Be supportive and offer help if needed  Tools to help you stop smoking:   · Counseling  from a trained healthcare provider can provide you with support and skills to quit smoking  The provider will also teach you to manage your withdrawal symptoms and cravings  You may receive counseling from one counselor, in group therapy, or through phone therapy called a quit line  · Nicotine replacement therapy (NRT)  such as nicotine patches, gum, or lozenges may help reduce your nicotine cravings  You may get these without a doctor's order  Do not use e-cigarettes or smokeless tobacco in place of cigarettes or to help you quit  They still contain nicotine  · Prescription medicines  such as nasal sprays or nicotine inhalers may help reduce your withdrawal symptoms  Other medicines may also be used to reduce your urge to smoke  Ask your healthcare provider about these medicines  You may need to start certain medicines 2 weeks before your quit date for them to work well  · Hypnosis  is a practice that helps guide you through thoughts and feelings  Hypnosis may help decrease your cravings and make you more willing to quit  · Acupuncture therapy  uses very thin needles to balance energy channels in the body  This is thought to help decrease cravings and symptoms of nicotine withdrawal     · Support groups  let you talk to others who are trying to quit or have already quit  It may be helpful to speak with others about how they quit      Manage your cravings:   · Avoid situations, people, and places that tempt you to smoke  Go to nonsmoking places, such as libraries or restaurants  Understand what tempts you and try to avoid these things  · Keep your hands busy  Hold things such as a stress ball or pen  · Put candy or toothpicks in your mouth  Keep lollipops, sugarless gum, or toothpicks with you at all times  · Do not have alcohol or caffeine  These drinks may tempt you to smoke  Drink healthy liquids such as water or juice instead  · Reward yourself when you resist your cravings  Rewards will motivate you and help you stay positive  · Do an activity that distracts you from your craving  Examples include cleaning, creating art, or gardening  Prevent weight gain after you quit:  You may gain a few pounds after you quit smoking  It is healthier for you to gain a few pounds than to continue to smoke  The following can help you prevent weight gain:  · Eat healthy foods  These include fruits, vegetables, whole-grain breads, low-fat dairy products, beans, lean meats, and fish  Eat healthy snacks, such as low-fat yogurt, if you get hungry between meals  · Drink water before, during, and between meals  This will make your stomach feel full and help prevent you from overeating  Ask your healthcare provider how much liquid to drink each day and which liquids are best for you  · Be physically active  Activity may help reduce your cravings and reduce stress  Take a walk or do some kind of physical activity every day  Ask your healthcare provider which activities are right for you  For support and more information:   · Smokefree  gov  Phone: 5- 546 - 629-4252  Web Address: www smokefree  [address] Information is for End User's use only and may not be sold, redistributed or otherwise used for commercial purposes   All illustrations and images included in CareNotes® are the copyrighted property of [employer]  or Kenrick Ramos  The above information is an  only  It is not intended as medical advice for individual conditions or treatments  Talk to your doctor, nurse or pharmacist before following any medical regimen to see if it is safe and effective for you

## 2021-02-24 NOTE — PROGRESS NOTES
Progress Note - General Surgery   [facility] 45 y o  male MRN: [id number]  Unit/Bed#: [address] [place] [address] A Encounter: [id number]    Assessment:  1) Right Pannus abscess s/p Incision and drainage  - draining appropriately, wound incision is relatively small, has multiple areas with nodular like lesions that do drain small amount of fluid but does not have any tenderness or redness, allegedly chronically infected regions on right inguinal canal and gluteal region, does have history of hidradenitis suppurative, AVSS currently, labs within normal limits  2) Thigh mass - Dr Dea Lema from [facility] patient  Patient with follow up in March  Patient to be 3 months s/p MI prior to any intervention   3) H/o MI   - continue home medications   4) DM   - strict glucose control   - goal glu <160   5) Morbid obesity   - patient would benefit from medical weight loss referal   - he is agreeable   - after 6 months of medical weight loss patient may be good candidate for surgical weight loss procedure     Plan:  1-5)   - repacked patient's wound with quarter-inch iodoform gauze  - would recommend continuing to repack wound for least 1 week in order to allow for appropriate drainage  - inspected and examined majority of other areas that look suspicious for hidradenitis suppurativa, time after acute exacerbation is soft, would recommend excisional biopsy of skin tissue and right groin and gluteal region at a later point in  - would attempt to transition over to p o  antibiotic based on sensitivities  - to complete 10 day course of p o  antibiotics  - daily packing change with quarter-inch iodoform gauze  - smoking cessation encouraged  - weight loss and encouraged  - better more adequate control of diabetes mellitus encourage    Subjective/Objective   Chief Complaint:  My skin is just weird and it gets infected so easy    Subjective:  Patient was seen examined at bedside  Patient denies any acute events overnight    Patient denies any fevers or chills  The patient does have some tenderness with repacking but otherwise his pain is improving  Patient has seen it draining quite extensively  Patient does have other lesions that are nontender and non erythematous but are not nodular in nature and do drain little bit of fluid from time to time when he squeezes on them  Patient has a history of hidradenitis  Patient does still smoke on occasion  Patient would like to lose some weight  Objective:     Blood pressure 126/58, pulse (!) 50, temperature 97 9 °F (36 6 °C), resp  rate 18, height 5' 9" (1 753 m), weight (!) 168 kg (370 lb), SpO2 94 %  ,Body mass index is 54 64 kg/m²  Intake/Output Summary (Last 24 hours) at 2/24/2021 1117  Last data filed at 2/24/2021 0936  Gross per 24 hour   Intake 1000 ml   Output --   Net 1000 ml       Invasive Devices     Peripheral Intravenous Line            Peripheral IV 02/23/21 Dorsal (posterior); Right Forearm less than 1 day                Physical Exam: /58   Pulse (!) 50   Temp 97 9 °F (36 6 °C)   Resp 18   Ht 5' 9" (1 753 m)   Wt (!) 168 kg (370 lb)   SpO2 94%   BMI 54 64 kg/m²   General appearance: alert and oriented, in no acute distress  Male genitalia: Right inguinal canal as 2 nodular like lesions that are primarily scar tissue, non erythematous and nontender, with palpation does express a small amount  Rectal: Patient does have on the gluteal region multiple nodular like lesions on the superior aspect of the gluteal folds with slight erythema and but nontender, with palpation does express slightly turbid serous type fluid  Skin: Abscess of pannus is draining moderate amount of serous and purulent fluid with packing in place, incision site is small but patent, quarter-inch is keeping wound open in order to allow for drainage, patient does have significant induration/edema as well as mild erythema surrounding the opening of the wound, patient is tender around the wound base and with packing changes    Lab, Imaging and other studies:  I have personally reviewed pertinent lab results    , CBC:   Lab Results   Component Value Date    WBC 7 05 02/24/2021    HGB 10 4 (L) 02/24/2021    HCT 33 9 (L) 02/24/2021    MCV 88 02/24/2021     02/24/2021    MCH 27 0 02/24/2021    MCHC 30 7 (L) 02/24/2021    RDW 13 2 02/24/2021    MPV 9 4 02/24/2021    NRBC 0 02/24/2021   , CMP:   Lab Results   Component Value Date    SODIUM 136 02/24/2021    K 4 0 02/24/2021     02/24/2021    CO2 24 02/24/2021    BUN 16 02/24/2021    CREATININE 1 05 02/24/2021    CALCIUM 8 6 02/24/2021    AST 19 02/24/2021    ALT 21 02/24/2021    ALKPHOS 96 02/24/2021    EGFR 89 02/24/2021     VTE Pharmacologic Prophylaxis: Enoxaparin (Lovenox)  VTE Mechanical Prophylaxis: sequential compression device

## 2021-02-24 NOTE — UTILIZATION REVIEW
Notification of Inpatient Admission/Inpatient Authorization Request   This is a Notification of Inpatient Admission for [address] [facility]  Be advised that this patient was admitted to our facility under Inpatient Status  Contact Roslyn Ya at [phone] for additional admission information  [address]  DEPT  DEDICATED -[phone]  Patient Name:   Sunita Gloria   YOB: 1980       [address]   [address]:   [address] [facility]  Tax ID: [id number]  NPI: [id number] Attending Provider/NPI:  Phone:  Address: Aracelis Rincon, Amie Felix [[id number]]  [phone]  Same as JESSIKA/Estrellita Rodriguez 1106 of Service Code: 24 Place of Service Name:  [address] [place] [address]   Start Date: 2/23/21 1631 Discharge Date & Time: No discharge date for patient encounter  Type of Admission: Inpatient Status Discharge Disposition (if discharged): Home/Self Care   Patient Diagnoses: Hyponatremia [E87 1]  Abdominal wall abscess [L02 211]  Skin irritation [R23 8]  CLOVIS (acute kidney injury) ([facility] [address] ) [N17 9]     Orders: Admission Orders (From admission, onward)     Ordered        02/23/21 1631  Inpatient Admission  Once         02/22/21 1251  Place in Observation  Once                    Assigned Utilization Review Contact: Kitty Ya  Utilization   Network Utilization Review Department  Phone: [phone]; Fax [phone]  Email: [email or web address]   ATTENTION PAYERS: Please call the assigned Utilization  directly with any questions or concerns ALL voicemails in the department are confidential  Send all requests for admission clinical reviews, approved or denied determinations and any other requests to dedicated fax number belonging to the campus where the patient is receiving treatment

## 2021-02-24 NOTE — PLAN OF CARE
Problem: PAIN - ADULT  Goal: Verbalizes/displays adequate comfort level or baseline comfort level  Description: Interventions:  - Encourage patient to monitor pain and request assistance  - Assess pain using appropriate pain scale-numeric  - Administer analgesics based on type and severity of pain and evaluate response  - Implement non-pharmacological measures as appropriate and evaluate response  - Consider cultural and social influences on pain and pain management  - Notify physician/advanced practitioner if interventions unsuccessful or patient reports new pain  Outcome: Progressing     Problem: INFECTION - ADULT  Goal: Absence or prevention of progression during hospitalization  Description: INTERVENTIONS:  - Assess and monitor for signs and symptoms of infection  - Monitor lab/diagnostic results  - Monitor all insertion sites, iv  - [place] appropriate cooling/warming therapies per order  - Administer medications as ordered  - Instruct and encourage patient and family to use good hand hygiene technique  Outcome: Progressing     Problem: SKIN/TISSUE INTEGRITY - ADULT  Goal: Skin integrity remains intact  Description: INTERVENTIONS  - Identify patients at risk for skin breakdown  - Assess and monitor skin integrity  - Assess and monitor nutrition and hydration status  - Monitor labs (i e  albumin)  - Assess for incontinence   - Turn and reposition patient  - Assist with mobility/ambulation  - Relieve pressure over bony prominences  - Avoid friction and shearing  - Provide appropriate hygiene as needed including keeping skin clean and dry  - Evaluate need for skin moisturizer/barrier cream  - Collaborate with interdisciplinary team (i e  Nutrition, Rehabilitation, etc )   - Patient/family teaching  Outcome: Progressing  Goal: Incision(s), wounds(s) or drain site(s) healing without S/S of infection  Description: INTERVENTIONS  - Assess and document risk factors for skin impairment   - Assess and document dressing, incision, wound bed, drain sites and surrounding tissue  - Consider nutrition services referral as needed  - Oral mucous membranes remain intact  - Provide patient/ family education  Outcome: Progressing

## 2021-02-24 NOTE — PROGRESS NOTES
[facility] Practice Progress Note - Corey Jones 45 y o  male MRN: [id number]    Unit/Bed#: 2 [place] [address] A Encounter: [id number]      Assessment/Plan:  * Skin abscess  Assessment & Plan  Abdominal wall skin abscess of panus  In ED patient started on clindamycin 600 mg, I&D attempted  Wound cultures sent  · Vancomycin renal dose given CLOVIS  · Follow-up wound cultures  · Culture grew Beta Hemolytic Streptococcus  · Pain control with acetaminophen 650 mg q 6 scheduled, tramadol 50 mg Q 4 hour p r n  Moderate pain, oxycodone 5 mg Q 4 hour p r n  Severe pain, dilaudid 0 2mg q3 hour p r n  Breakthrough pain  · Surgery consulted, appreciate ongoing recommendations  CAD (coronary artery disease)  Assessment & Plan  Patient history of cardiac catheterization x2 in 12/01/2020 PCI mid RCA 80% lesion  In 2010 PCI of LCX  Patient denies chest pain on admission, troponin negative x1  At home patient takes Aspirin 81 mg qd, Atorvastatin 40 mg qd, Lisinopril 10 mg, Coreg 3 125 mg, Brilinta 90 mg Q12    · Continue patient's Aspirin 81 mg qd, Atorvastatin 40 mg qd, Coreg 3 125 mg, Brilinta 90 mg Q12  · Hold lisinopril in setting of CLOVIS  Hyponatremia  Assessment & Plan  On presentation Na 129  Patient does not take an diuretics  · Urine NA, Urine osmol  · Monitor daily  · IVF NS @ 125    CLOVIS (acute kidney injury) ([facility] [address] )  Assessment & Plan  On admission patient's creatinine 1 97, baseline around 1  Patient BUN 33 ratio 16 7     · Hold lisinopril in setting of CLOVIS  · IVF with NS  · CLOVIS resolved, creatinine decreased to 1 04  · Resume lisinopril on discharge    DM type 2, uncontrolled, with neuropathy [facility])  Assessment & Plan    Lab Results   Component Value Date    HGBA1C 8 4 (H) 11/29/2020     Last A1c 11/29 8 4  At home patient takes glimepiride 4 mg Q a m , metformin 1000 mg b i d  · Hold patient's home medications  · ISS        HTN (hypertension)  Assessment & Plan  Patient's BP up to 140/63 in ED  Patient takes Norvasc 10 mg, Coreg 3 125 BID, and lisinopril 10 mg qd  · Continue patient's Norvasc and Coreg  · Hold lisinopril given CLOVIS    Dyslipidemia  Assessment & Plan  Last lipid panel 11/29/2020 , , HDL 29,   Patient takes atorvastatin 40 mg q d  at home  · Continue home medications              Subjective:   Patient seen examined at bedside  Patient no acute distress  Patient reports that his pain is well controlled  Patient is asking if he would be able to shower today  Patient has acute time  Patient denies chest pain, shortness of breath nausea constipation pain, leg swelling  Plan to transition patient to p o  Antibiotics later today with discharge home patient will be provided with instructions for wound care and follow-up for wound care center  Objective:     Vitals: Blood pressure 126/58, pulse (!) 50, temperature 97 9 °F (36 6 °C), resp  rate 18, height 5' 9" (1 753 m), weight (!) 168 kg (370 lb), SpO2 94 %  ,Body mass index is 54 64 kg/m²  Wt Readings from Last 3 Encounters:   02/22/21 (!) 168 kg (370 lb)   01/26/21 (!) 169 kg (373 lb 8 oz)   01/14/21 (!) 170 kg (374 lb)       Intake/Output Summary (Last 24 hours) at 2/24/2021 1136  Last data filed at 2/24/2021 0936  Gross per 24 hour   Intake 1000 ml   Output --   Net 1000 ml       Physical Exam:   Physical Exam  Constitutional:       General: He is not in acute distress  Appearance: He is obese  HENT:      Head: Normocephalic and atraumatic  Cardiovascular:      Rate and Rhythm: Normal rate and regular rhythm  Pulses: Normal pulses  Heart sounds: Normal heart sounds  No murmur  No friction rub  No gallop  Pulmonary:      Effort: Pulmonary effort is normal  No respiratory distress  Breath sounds: Normal breath sounds  No wheezing or rhonchi  Abdominal:      General: Bowel sounds are normal       Palpations: Abdomen is soft  Tenderness: There is abdominal tenderness        Comments: Red, indurated, erythematous   Musculoskeletal:      Right lower leg: No edema  Left lower leg: No edema  Skin:     Capillary Refill: Capillary refill takes less than 2 seconds  Findings: Abscess present  Comments: Patient abscess packed covered with dressing  Panus indurated, erythematous  Wound packed, covered with gauze  Neurological:      Mental Status: He is alert and oriented to person, place, and time              Recent Results (from the past 24 hour(s))   Fingerstick Glucose (POCT)    Collection Time: 02/23/21  4:15 PM   Result Value Ref Range    POC Glucose 97 65 - 140 mg/dl   Fingerstick Glucose (POCT)    Collection Time: 02/23/21  8:23 PM   Result Value Ref Range    POC Glucose 137 65 - 140 mg/dl   CBC and differential    Collection Time: 02/24/21  6:22 AM   Result Value Ref Range    WBC 7 05 4 31 - 10 16 Thousand/uL    RBC 3 85 (L) 3 88 - 5 62 Million/uL    Hemoglobin 10 4 (L) 12 0 - 17 0 g/dL    Hematocrit 33 9 (L) 36 5 - 49 3 %    MCV 88 82 - 98 fL    MCH 27 0 26 8 - 34 3 pg    MCHC 30 7 (L) 31 4 - 37 4 g/dL    RDW 13 2 11 6 - 15 1 %    MPV 9 4 8 9 - 12 7 fL    Platelets 681 764 - 745 Thousands/uL    nRBC 0 /100 WBCs    Neutrophils Relative 63 43 - 75 %    Immat GRANS % 0 0 - 2 %    Lymphocytes Relative 24 14 - 44 %    Monocytes Relative 9 4 - 12 %    Eosinophils Relative 3 0 - 6 %    Basophils Relative 1 0 - 1 %    Neutrophils Absolute 4 36 1 85 - 7 62 Thousands/µL    Immature Grans Absolute 0 03 0 00 - 0 20 Thousand/uL    Lymphocytes Absolute 1 72 0 60 - 4 47 Thousands/µL    Monocytes Absolute 0 66 0 17 - 1 22 Thousand/µL    Eosinophils Absolute 0 24 0 00 - 0 61 Thousand/µL    Basophils Absolute 0 04 0 00 - 0 10 Thousands/µL   Phosphorus    Collection Time: 02/24/21  6:22 AM   Result Value Ref Range    Phosphorus 3 3 2 7 - 4 5 mg/dL   Magnesium    Collection Time: 02/24/21  6:22 AM   Result Value Ref Range    Magnesium 1 7 1 6 - 2 6 mg/dL   Comprehensive metabolic panel    Collection Time: 02/24/21  6:22 AM   Result Value Ref Range    Sodium 136 136 - 145 mmol/L    Potassium 4 0 3 5 - 5 3 mmol/L    Chloride 102 100 - 108 mmol/L    CO2 24 21 - 32 mmol/L    ANION GAP 10 4 - 13 mmol/L    BUN 16 5 - 25 mg/dL    Creatinine 1 05 0 60 - 1 30 mg/dL    Glucose 125 65 - 140 mg/dL    Calcium 8 6 8 3 - 10 1 mg/dL    Corrected Calcium 10 2 (H) 8 3 - 10 1 mg/dL    AST 19 5 - 45 U/L    ALT 21 12 - 78 U/L    Alkaline Phosphatase 96 46 - 116 U/L    Total Protein 7 8 6 4 - 8 2 g/dL    Albumin 2 0 (L) 3 5 - 5 0 g/dL    Total Bilirubin 0 20 0 20 - 1 00 mg/dL    eGFR 89 ml/min/1 73sq m   Fingerstick Glucose (POCT)    Collection Time: 02/24/21  7:28 AM   Result Value Ref Range    POC Glucose 126 65 - 140 mg/dl   Fingerstick Glucose (POCT)    Collection Time: 02/24/21 11:12 AM   Result Value Ref Range    POC Glucose 183 (H) 65 - 140 mg/dl       Current Facility-Administered Medications   Medication Dose Route Frequency Provider Last Rate Last Admin    acetaminophen (TYLENOL) tablet 650 mg  650 mg Oral Q6H [facility] Tim Humphrey PA-C   650 mg at 02/24/21 1123    amLODIPine (NORVASC) tablet 10 mg  10 mg Oral Daily Pollo Hui MD   10 mg at 02/24/21 1501    aspirin (ECOTRIN LOW STRENGTH) EC tablet 81 mg  81 mg Oral Daily Janet Morales MD   81 mg at 02/24/21 0322    atorvastatin (LIPITOR) tablet 40 mg  40 mg Oral Daily With Miles Prado MD   40 mg at 02/23/21 1652    carvedilol (COREG) tablet 3 125 mg  3 125 mg Oral BID With Meals Pollo Hui MD   3 125 mg at 02/24/21 0838    enoxaparin (LOVENOX) subcutaneous injection 40 mg  40 mg Subcutaneous Daily Pollo Hui MD   40 mg at 02/24/21 0421    HYDROmorphone (DILAUDID) injection 0 2 mg  0 2 mg Intravenous Q3H PRN Sherin Rebolledo PA-C   0 2 mg at 02/24/21 0947    insulin lispro (HumaLOG) 100 units/mL subcutaneous injection 1-6 Units  1-6 Units Subcutaneous 4x Daily (AC & HS) Sally Odonnell MD   1 Units at 02/24/21 1126    nicotine (NICODERM CQ) 21 mg/24 hr TD 24 hr patch 1 patch  1 patch Transdermal Daily Weston Noriega MD        ondansetron [facility]) injection 4 mg  4 mg Intravenous Q6H PRN Weston Noriega MD        oxyCODONE (ROXICODONE) IR tablet 5 mg  5 mg Oral Q4H PRN Jarrell Malone PA-C   5 mg at 02/24/21 2414    sodium chloride 0 9 % infusion  125 mL/hr Intravenous Continuous Weston Noriega  mL/hr at 02/24/21 0936 125 mL/hr at 02/24/21 0936    ticagrelor (BRILINTA) tablet 90 mg  90 mg Oral Q12H 304 Carlos Dillard MD   90 mg at 02/24/21 4184    traMADol (ULTRAM) tablet 50 mg  50 mg Oral Q4H PRN Jarrell Malone PA-C        vancomycin (VANCOCIN) 1,750 mg in sodium chloride 0 9 % 500 mL IVPB  15 mg/kg (Adjusted) Intravenous Q12H Weston Noriega  mL/hr at 02/24/21 0518 1,750 mg at 02/24/21 0518       Invasive Devices     Peripheral Intravenous Line            Peripheral IV 02/23/21 Dorsal (posterior); Right Forearm less than 1 day                Lab, Imaging and other studies: I have personally reviewed pertinent reports      VTE Pharmacologic Prophylaxis: Enoxaparin (Lovenox)  VTE Mechanical Prophylaxis: sequential compression device    Weston Noriega MD

## 2021-02-25 LAB
BACTERIA WND AEROBE CULT: ABNORMAL
BACTERIA WND AEROBE CULT: ABNORMAL
GRAM STN SPEC: ABNORMAL
GRAM STN SPEC: ABNORMAL

## 2021-02-25 NOTE — UTILIZATION REVIEW
Notification of Discharge  This is a Notification of Discharge from our facility [address]  Please be advised that this patient has been discharge from our facility  Below you will find the admission and discharge date and time including the patients disposition  PRESENTATION DATE: 2/22/2021  8:44 AM  OBS ADMISSION DATE:   IP ADMISSION DATE: 2/23/21 1631   DISCHARGE DATE: 2/24/2021  2:35 PM  DISPOSITION: Home/Self Care Home/Self Care   Admission Orders listed below:  Admission Orders (From admission, onward)     Ordered        02/23/21 1631  Inpatient Admission  Once         02/22/21 1251  Place in Observation  Once                   Please contact the UR Department if additional information is required to close this patient's authorization/case  David Leal  [facility] Utilization Review Department  Main: [phone] x carefully listen to the prompts  All voicemails are confidential   [email or web address]  Send all requests for admission clinical reviews, approved or denied determinations and any other requests to dedicated fax number below belonging to the campus where the patient is receiving treatment   List of dedicated fax numbers:  [address] DENIALS (Administrative/Medical Necessity) [phone]   [address] (Maternity/NICU/Pediatrics) [phone]   Ja Estrada [phone]   Cecelia Lopez [phone]   [facility] [phone]   Marck Casey [address] [phone]   [facility]  [phone]   [address] [place] [address], [facility]  24[address] [facility] [address] [facility] [phone]

## 2021-02-25 NOTE — DISCHARGE SUMMARY
[facility] 26 Discharge Summary - Medical Gopi Patel 45 y o  male MRN: [id number]    [facility] 45  Room / Bed: 100 [facility] A Encounter: [id number]    BRIEF OVERVIEW  Admitting Provider: Bruce Alfredo MD  Discharge Provider: No att  providers found    Discharge To: Home      Outpatient Follow-Up:   [address]    Things to address at first follow up visit:   Is abscess healing well  Has patient been taking oral antibiotics  Has patient followed up with wound care center  Refer patient to medical weight loss program  Has patient's pain been well controlled  Patient scheduled to follow-up for thigh mass with surgery in March      Labs and results pending at discharge:   None    Admission Date: 2/22/2021     Discharge Date: 2/24/2021  2:35 PM    Primary Discharge Diagnosis  Principal Problem:    Skin abscess  Active Problems:    CAD (coronary artery disease)    Dyslipidemia    HTN (hypertension)    DM type 2, uncontrolled, with neuropathy ([facility] [address] )  Resolved Problems:    Hyponatremia    CLOVIS (acute kidney injury) ([facility] )        Consulting Providers   [address] [facility],[facility] [address] STAY    Procedures Performed/Pertinent Test results  2/24:  Na 136, K 4 0, CR 1 05, BUN 16, WBC 7 05, HGB 10 4, , wound cultures Beta Hemolytic Streptococcus Group C  2/23: Na 133, K 3 7, CR 1 41, BUN 28, GLU 85, Ca 8 1,  WBC 11 99, HGB 10 4,   2/22 :  Na 129, K 4 2, CR 1 97, BUN 33, glucose 104, WBC 20 7, HGB 12 1, Plt 431, LA 0 9, troponin (-) x1, CT abscess of abdominal wall measuring 5 1 x 3 6 x 2 6 cm        HPI  Gopi Patel is a 45 y o  male with PMH of DM , CAD, HTN, hyperlipidemia who presents with 2 day history of abdominal wound  Patient reports pannus has become more red, swollen, and painful  Reports subjective fever and malaise  Last night had episode of vomiting    On day of admission patient felt light headed and sweaty, prompting him to go to ED for evaluation  In ED patient found to be hyponatremic with sodium of 129  He denies chest pain, shortness of breath, diarrhea, constipation, headache, dizziness, weakness, leg pain leg swelling  Hospital Course    Abscess   Patient presented with pain of his pannus, CT showed skin abscess  In ED I and D was done, an abscess was packed with dressing  Patient was started on IV vancomycin on admission, and continued throughout hospital course  His pain was On day of discharge patient's wound culture returned showing beta-hemolytic strep and patient was transitioned to p o  Doxycycline 100 mg BID, patient was prescribed Tylenol for pain control at discharge  HUMPHREY   On presentation patient's creatinine elevated above baseline  Patient's lisinopril held  Patient was hydrated with normal saline  Creatinine monitor throughout admission, Humphrey I resolved prior to discharge  On discharge patient's lisinopril was restarted  CAD   Patient had recent cardiac catheterization 12/01/2020 is she  On admission patient denied chest pain troponins negative x1  Patient's home medications were continued throughout hospitalization  Patient denies chest pain throughout the course of hospitalization  At discharge patient was continued on home medications  DM   Patient is poorly controlled diabetic, home diabetic medications were held during hospitalization  Patient was on insulin sliding scale  Throughout hospitalization patient's blood glucose was well controlled  Patient was restarted on home medications at discharge  Hyponatremia   On presentation patient found to be hyponatremic patient was hydrated with IV fluid hydrations with normal saline  On discharge patient's sodium had returned to normal levels  Physical Exam at Discharge  Physical Exam  Constitutional:       General: He is not in acute distress  Appearance: He is obese  HENT:      Head: Normocephalic and atraumatic  Cardiovascular:      Rate and Rhythm: Normal rate and regular rhythm  Pulses: Normal pulses  Heart sounds: Normal heart sounds  No murmur  No friction rub  No gallop  Pulmonary:      Effort: Pulmonary effort is normal  No respiratory distress  Breath sounds: Normal breath sounds  No wheezing or rhonchi  Abdominal:      General: Bowel sounds are normal       Palpations: Abdomen is soft  Tenderness: There is abdominal tenderness  Comments: Red, indurated, erythematous   Musculoskeletal:      Right lower leg: No edema  Left lower leg: No edema  Skin:     Capillary Refill: Capillary refill takes less than 2 seconds  Findings: Abscess present  Comments: Patient abscess packed covered with dressing  Panus indurated, erythematous  Wound packed, covered with gauze  Neurological:      Mental Status: He is alert and oriented to person, place, and time             Medications   Discharge Medication List as of 2/24/2021  2:14 PM          Discharge Medication List as of 2/24/2021  2:14 PM      CONTINUE these medications which have NOT CHANGED    Details   amLODIPine (NORVASC) 10 mg tablet take 1 tablet by mouth once daily, Normal      aspirin (ECOTRIN LOW STRENGTH) 81 mg EC tablet Take 1 tablet (81 mg total) by mouth daily, Starting Thu 12/3/2020, Normal      atorvastatin (LIPITOR) 40 mg tablet Take 1 tablet (40 mg total) by mouth daily, Starting Wed 12/9/2020, Normal      carvedilol (COREG) 3 125 mg tablet Take 1 tablet (3 125 mg total) by mouth 2 (two) times a day with meals, Starting Tue 1/26/2021, Normal      glimepiride (AMARYL) 4 mg tablet take 1 tablet once daily WITH BREAKFAST, Normal      lisinopril (ZESTRIL) 10 mg tablet Take 1 tablet (10 mg total) by mouth daily, Starting Tue 1/26/2021, Normal      metFORMIN (GLUCOPHAGE) 1000 MG tablet Take 1 tablet (1,000 mg total) by mouth 2 (two) times a day with meals, Starting Tue 1/26/2021, Normal      Omega-3 Fatty Acids (fish oil) 1,000 mg Take 1 capsule (1,000 mg total) by mouth 2 (two) times a day, Starting Wed 12/9/2020, Normal      ticagrelor (BRILINTA) 90 MG Take 1 tablet (90 mg total) by mouth every 12 (twelve) hours, Starting Tue 1/26/2021, Normal            Discharge Medication List as of 2/24/2021  2:14 PM      START taking these medications    Details   acetaminophen (TYLENOL) 325 mg tablet Take 2 tablets (650 mg total) by mouth every 6 (six) hours for 5 days, Starting Wed 2/24/2021, Until Mon 3/1/2021, Normal      doxycycline hyclate (VIBRA-TABS) 100 mg tablet Take 1 tablet (100 mg total) by mouth 2 (two) times a day for 7 days, Starting Wed 2/24/2021, Until Wed 3/3/2021, Normal            Discharge Medication List as of 2/24/2021  2:14 PM             Allergies  Allergies   Allergen Reactions    Amoxicillin        Diet restrictions: Diabetic  Activity restrictions: none  Code Status: Prior  Advance Directive and Living Will: <no information>  Power of :    POLST:      Discharge Condition: good      Discharge  Statement   I spent 45 minutes discharging the patient  This time was spent on the day of discharge  I had direct contact with the patient on the day of discharge  Additional documentation is required if more than 30 minutes were spent on discharge

## 2021-02-27 LAB
BACTERIA BLD CULT: NORMAL
BACTERIA BLD CULT: NORMAL

## 2021-02-28 LAB
ATRIAL RATE: 63 BPM
P AXIS: 57 DEGREES
PR INTERVAL: 138 MS
QRS AXIS: 8 DEGREES
QRSD INTERVAL: 84 MS
QT INTERVAL: 404 MS
QTC INTERVAL: 413 MS
T WAVE AXIS: 3 DEGREES
VENTRICULAR RATE: 63 BPM

## 2021-02-28 PROCEDURE — 93010 ELECTROCARDIOGRAM REPORT: CPT | Performed by: INTERNAL MEDICINE

## 2021-03-02 ENCOUNTER — TRANSITIONAL CARE MANAGEMENT (OUTPATIENT)
Dept: FAMILY MEDICINE CLINIC | Facility: CLINIC | Age: 41
End: 2021-03-02

## 2021-03-02 DIAGNOSIS — I10 ESSENTIAL HYPERTENSION: ICD-10-CM

## 2021-03-02 DIAGNOSIS — IMO0002 DM TYPE 2, UNCONTROLLED, WITH NEUROPATHY: ICD-10-CM

## 2021-03-02 PROCEDURE — 4010F ACE/ARB THERAPY RXD/TAKEN: CPT | Performed by: SURGERY

## 2021-03-02 RX ORDER — LISINOPRIL 10 MG/1
10 TABLET ORAL DAILY
Qty: 90 TABLET | Refills: 1 | Status: SHIPPED | OUTPATIENT
Start: 2021-03-02 | End: 2021-11-04

## 2021-03-02 RX ORDER — CARVEDILOL 3.12 MG/1
3.12 TABLET ORAL 2 TIMES DAILY WITH MEALS
Qty: 180 TABLET | Refills: 1 | Status: SHIPPED | OUTPATIENT
Start: 2021-03-02 | End: 2021-03-15

## 2021-03-02 RX ORDER — GLIMEPIRIDE 4 MG/1
4 TABLET ORAL
Qty: 30 TABLET | Refills: 5 | Status: SHIPPED | OUTPATIENT
Start: 2021-03-02 | End: 2021-03-09 | Stop reason: SDUPTHER

## 2021-03-08 ENCOUNTER — CONSULT (OUTPATIENT)
Dept: SURGERY | Facility: CLINIC | Age: 41
End: 2021-03-08
Payer: COMMERCIAL

## 2021-03-08 DIAGNOSIS — L73.2 HIDRADENITIS SUPPURATIVA: ICD-10-CM

## 2021-03-08 DIAGNOSIS — E66.01 MORBID OBESITY DUE TO EXCESS CALORIES (HCC): Primary | ICD-10-CM

## 2021-03-08 PROCEDURE — 99203 OFFICE O/P NEW LOW 30 MIN: CPT | Performed by: SURGERY

## 2021-03-08 NOTE — LETTER
March 8, 2021     [facility], DO  [facility]    Patient: Paul Lees   YOB: 1980   Date of Visit: 3/8/2021       Dear Dr Marie Starr: Thank you for referring Ratna Arthur to me for evaluation  Below are my notes for this consultation  If you have questions, please do not hesitate to call me  I look forward to following your patient along with you  Sincerely,        Romana Letters, MD        CC: No Recipients  Amanda Lay MD  3/8/2021  9:14 AM  Incomplete  Office Visit - General Surgery  Paul Lees MRN: [id number]  Encounter: [id number]    Assessment and Plan    Problem List Items Addressed This Visit     None          Chief Complaint:  Paul Lees is a 45 y o  male who presents for No chief complaint on file      Subjective  The patient is a 45 M with a PMHx of DM, MI in December of 2020 with repeat PCI, hidradenitis suppurativa with a large lymphatic swelling/ mass of the     Past Medical History  Past Medical History:   Diagnosis Date    Coronary artery disease     Diabetes mellitus ([address] )     Heart disease     CAD   s/p ptca with 1 stent 2012    Hidradenitis suppurativa     groin    Hyperlipidemia     Hypertension     MI (myocardial infarction) ([address] )     " silent " M I  in the past    Myocardial infarction ([address] )     Nicotine dependence     Obesity     Pilonidal cyst        Past Surgical History  Past Surgical History:   Procedure Laterality Date    CORONARY ANGIOPLASTY WITH STENT PLACEMENT      INCISION AND DRAINAGE OF WOUND N/A 1/24/2017    Procedure: INCISION AND DRAINAGE (I&D) BUTTOCK, PILONIDAL CYST;  Surgeon: Ian Kessler MD;  Location: [address] [place] [address];  Service:    [facility] LEG SURGERY Left     I&D of left leg 2012       Family History  Family History   Problem Relation Age of Onset    Heart disease Father        Medications  Current Outpatient Medications on File Prior to Visit   Medication Sig Dispense Refill  amLODIPine (NORVASC) 10 mg tablet take 1 tablet by mouth once daily 90 tablet 0    aspirin (ECOTRIN LOW STRENGTH) 81 mg EC tablet Take 1 tablet (81 mg total) by mouth daily 30 tablet 0    atorvastatin (LIPITOR) 40 mg tablet Take 1 tablet (40 mg total) by mouth daily 90 tablet 2    carvedilol (COREG) 3 125 mg tablet Take 1 tablet (3 125 mg total) by mouth 2 (two) times a day with meals 180 tablet 1    glimepiride (AMARYL) 4 mg tablet Take 1 tablet (4 mg total) by mouth daily with breakfast 30 tablet 5    lisinopril (ZESTRIL) 10 mg tablet Take 1 tablet (10 mg total) by mouth daily 90 tablet 1    metFORMIN (GLUCOPHAGE) 1000 MG tablet Take 1 tablet (1,000 mg total) by mouth 2 (two) times a day with meals 180 tablet 1    Omega-3 Fatty Acids (fish oil) 1,000 mg Take 1 capsule (1,000 mg total) by mouth 2 (two) times a day 180 capsule 2    ticagrelor (BRILINTA) 90 MG Take 1 tablet (90 mg total) by mouth every 12 (twelve) hours 60 tablet 5     No current facility-administered medications on file prior to visit  Allergies  Allergies   Allergen Reactions    Amoxicillin        Review of Systems    Objective  There were no vitals filed for this visit      Physical Exam

## 2021-03-08 NOTE — PATIENT INSTRUCTIONS
Please keep all of your abdominal and groin wounds clean and dry  Dress them with gauze as needed and bathe daily  Seek medical attention at the nearest ED for signs of infection  Please Follow-up with your primary doctor in 1-2 weeks and ask about medications for hidradenitis suppurativa, and ask about the possibility off taking antibiotics or Humira to suppress your chronic disease    Please follow up with your cardiologist in 1-2 weeks to determine when you will be safe for a possible surgery to address your hidradenitis  Please follow up with the bariatric surgery office, Dr Amaury Redding, to be seen for evaluation for weight loss surgery to improve yopur chance of recovery from these chronic skin issues

## 2021-03-08 NOTE — PROGRESS NOTES
Office Visit - General Surgery  Mary Hanson MRN: [id number]  Encounter: [id number]    Assessment and Plan  45 M w/ DM, CAD s/p MI and PCI on DAPT since 12/2020 with severe hidradenitis suppurativa with large swelling of the left groin that has been present for one year  No b symptoms or active infection  Patient also has pilondal disease Grade III  PLAN  -continue local wound care  -continue efforts toward weight loss  -perineal hygeine   -PCP follow-up 1-2 weeks   Intensify glycemic control   Smoking cessation materials and trial of Chantix vs nicotine replacement   Explore pharmacotherapy, including biologics, for HS  - Cardiology follow-up 1-2 weeks, Dr Kateryna Rob   Evaluate for surgical risk profile and determine safe timing for possible bariatric intervention  -Ambulatory referral to bariatric surgery, Dr Johnson     Evaluate for weight loss surgery as soon as feasible with smoking cessation and cardiac risk stratification/ DAPT can be held  -Ambulatory referral to plastic surgery, Dr Quan Vicente    Evaluation for excision and reconstruction options       Problem List Items Addressed This Visit     None          Chief Complaint:  Mary Hanson is a 45 y o  male who presents for No chief complaint on file  Subjective  The patient is a 45 M with a PMHx of DM, MI in December of 2020 with repeat PCI, hidradenitis suppurativa with a large lymphatic swelling/ mass of the L groin presnet for one year  He currently denies B symptoms and signs/symptoms of current infection  His HS and pilonidal sites are draining spontaneously       Past Medical History  Past Medical History:   Diagnosis Date    Coronary artery disease     Diabetes mellitus [facility]     Heart disease     CAD   s/p ptca with 1 stent 2012    Hidradenitis suppurativa     groin    Hyperlipidemia     Hypertension     MI (myocardial infarction) ([address] )     " silent " M I  in the past    Myocardial infarction ([facility]     Nicotine dependence     Obesity     Pilonidal cyst        Past Surgical History  Past Surgical History:   Procedure Laterality Date    CORONARY ANGIOPLASTY WITH STENT PLACEMENT      INCISION AND DRAINAGE OF WOUND N/A 1/24/2017    Procedure: INCISION AND DRAINAGE (I&D) BUTTOCK, PILONIDAL CYST;  Surgeon: Julia Marquez MD;  Location: [facility];  Service:    [facility] LEG SURGERY Left     I&D of left leg 2012       Family History  Family History   Problem Relation Age of Onset    Heart disease Father        Medications  Current Outpatient Medications on File Prior to Visit   Medication Sig Dispense Refill    amLODIPine (NORVASC) 10 mg tablet take 1 tablet by mouth once daily 90 tablet 0    aspirin (ECOTRIN LOW STRENGTH) 81 mg EC tablet Take 1 tablet (81 mg total) by mouth daily 30 tablet 0    atorvastatin (LIPITOR) 40 mg tablet Take 1 tablet (40 mg total) by mouth daily 90 tablet 2    carvedilol (COREG) 3 125 mg tablet Take 1 tablet (3 125 mg total) by mouth 2 (two) times a day with meals 180 tablet 1    glimepiride (AMARYL) 4 mg tablet Take 1 tablet (4 mg total) by mouth daily with breakfast 30 tablet 5    lisinopril (ZESTRIL) 10 mg tablet Take 1 tablet (10 mg total) by mouth daily 90 tablet 1    metFORMIN (GLUCOPHAGE) 1000 MG tablet Take 1 tablet (1,000 mg total) by mouth 2 (two) times a day with meals 180 tablet 1    Omega-3 Fatty Acids (fish oil) 1,000 mg Take 1 capsule (1,000 mg total) by mouth 2 (two) times a day 180 capsule 2    ticagrelor (BRILINTA) 90 MG Take 1 tablet (90 mg total) by mouth every 12 (twelve) hours 60 tablet 5     No current facility-administered medications on file prior to visit  Allergies  Allergies   Allergen Reactions    Amoxicillin        Review of Systems   Constitutional: Positive for activity change  Respiratory: Positive for shortness of breath  Musculoskeletal: Positive for arthralgias  Skin: Positive for color change, rash and wound     All other systems reviewed and are negative  Objective  There were no vitals filed for this visit  Physical Exam  Vitals signs reviewed  Constitutional:       General: He is not in acute distress  Appearance: He is obese  He is not ill-appearing  HENT:      Head: Normocephalic and atraumatic  Mouth/Throat:      Mouth: Mucous membranes are moist    Pulmonary:      Effort: Pulmonary effort is normal  No respiratory distress  Abdominal:      General: Abdomen is protuberant  Palpations: Abdomen is soft  Tenderness: There is no abdominal tenderness  Genitourinary:     Pubic Area: Rash present  Penis: Circumcised  Lymphadenopathy:      Cervical: Cervical adenopathy present  Right cervical: No superficial cervical adenopathy  Left cervical: No superficial cervical adenopathy  Neurological:      Mental Status: He is alert  GCS: GCS eye subscore is 4  GCS verbal subscore is 5  GCS motor subscore is 6  Cranial Nerves: Cranial nerves are intact  Motor: Motor function is intact     Psychiatric:         Attention and Perception: Attention normal          Mood and Affect: Mood and affect normal

## 2021-03-09 ENCOUNTER — TELEMEDICINE (OUTPATIENT)
Dept: FAMILY MEDICINE CLINIC | Facility: CLINIC | Age: 41
End: 2021-03-09
Payer: COMMERCIAL

## 2021-03-09 DIAGNOSIS — F17.200 TOBACCO DEPENDENCE: Primary | ICD-10-CM

## 2021-03-09 DIAGNOSIS — IMO0002 DM TYPE 2, UNCONTROLLED, WITH NEUROPATHY: ICD-10-CM

## 2021-03-09 DIAGNOSIS — I10 ESSENTIAL HYPERTENSION: ICD-10-CM

## 2021-03-09 DIAGNOSIS — E66.01 MORBID OBESITY (HCC): ICD-10-CM

## 2021-03-09 PROCEDURE — 99213 OFFICE O/P EST LOW 20 MIN: CPT | Performed by: FAMILY MEDICINE

## 2021-03-09 RX ORDER — GLIMEPIRIDE 4 MG/1
4 TABLET ORAL
Qty: 90 TABLET | Refills: 1 | Status: SHIPPED | OUTPATIENT
Start: 2021-03-09 | End: 2022-04-20 | Stop reason: SINTOL

## 2021-03-09 RX ORDER — NICOTINE 21 MG/24HR
1 PATCH, TRANSDERMAL 24 HOURS TRANSDERMAL EVERY 24 HOURS
Qty: 28 PATCH | Refills: 0 | Status: SHIPPED | OUTPATIENT
Start: 2021-03-09 | End: 2021-04-03

## 2021-03-09 NOTE — PROGRESS NOTES
Virtual Brief Visit    Assessment/Plan:    Problem List Items Addressed This Visit        Endocrine    DM type 2, uncontrolled, with neuropathy ([facility] )    Relevant Medications    glimepiride (AMARYL) 4 mg tablet    metFORMIN (GLUCOPHAGE) 1000 MG tablet       Cardiovascular and Mediastinum    HTN (hypertension)       Other    Morbid obesity ([facility] )      Other Visit Diagnoses     Tobacco dependence    -  Primary    Relevant Medications    nicotine (NICODERM CQ) 21 mg/24 hr TD 24 hr patch        Medication refill/pharmacy issue resolved, new scripts sent  For tobacco dependence, pt agreeable to trying nicotine patch  He has multiple referrals and appointments set up now for bariatric surgery, cardiology, and plastic surgery and appears motivated to make changes to improve his health  We did not have the opportunity to discuss pharmacotherapy options for his history of hidradenitis suppurativa at this visit but will plan to follow up closely, have him return for office visit for further discussion of therapy at that time and address other outstanding care gaps at that time  Discussed with attending physician Dr Neeta Carpenter          Reason for visit is   Chief Complaint   Patient presents with    Virtual Brief Visit        Encounter provider Linda Henry DO    Provider located at [address] [place] [id number]    Recent Visits  No visits were found meeting these conditions  Showing recent visits within past 7 days and meeting all other requirements     Today's Visits  Date Type Provider Dept   03/09/21 Telemedicine Linda Henry DO [facility]   Showing today's visits and meeting all other requirements     Future Appointments  No visits were found meeting these conditions     Showing future appointments within next 150 days and meeting all other requirements        After connecting through telephone, the patient was identified by name and date of birth  Kayden Nguyen was informed that this is a telemedicine visit and that the visit is being conducted through telephone  My office door was closed  No one else was in the room  He acknowledged consent and understanding of privacy and security of the platform  The patient has agreed to participate and understands he can discontinue the visit at any time  Patient is aware this is a billable service  Subjective    Kayden Nguyen is a 45 y o  male  Calling today to follow up on his chronic issues  Since last office visit with me in January, admitted to hospital with skin infection/abscess, discharged on antibiotics which he was taking  Seen by surgeon yesterday for consult, states appointment went well, was instructed to continue with local wound care, referred to plastic surgery  Has multiple appointments set up now for bariatrics and cardiology  Having issues with medications/pharmacy, new insurance  Needs everything sent to [facility] for 90 day supply  Resend Glimepiride, Metformin  Discussed smoking cessation  Agreeable to trying nicotine patch  Currently smoking up to 1 ppd       Past Medical History:   Diagnosis Date    Coronary artery disease     Diabetes mellitus [facility]     Heart disease     CAD   s/p ptca with 1 stent 2012    Hidradenitis suppurativa     groin    Hyperlipidemia     Hypertension     MI (myocardial infarction) ([address] )     " silent " M I  in the past    Morbid obesity with BMI of 50 0-59 9, adult ([facility] )     Myocardial infarction ([facility] )     Nicotine dependence     Obesity     Pilonidal cyst        Past Surgical History:   Procedure Laterality Date    CORONARY ANGIOPLASTY WITH STENT PLACEMENT      INCISION AND DRAINAGE OF WOUND N/A 1/24/2017    Procedure: INCISION AND DRAINAGE (I&D) BUTTOCK, PILONIDAL CYST;  Surgeon: Rachael Salamanca MD;  Location: [address] [place] [address];  Service:    [address] [place] [address] [facility] LEG SURGERY Left     I&D of left leg 2012       Current Outpatient Medications Medication Sig Dispense Refill    amLODIPine (NORVASC) 10 mg tablet take 1 tablet by mouth once daily 90 tablet 0    aspirin (ECOTRIN LOW STRENGTH) 81 mg EC tablet Take 1 tablet (81 mg total) by mouth daily 30 tablet 0    atorvastatin (LIPITOR) 40 mg tablet Take 1 tablet (40 mg total) by mouth daily 90 tablet 2    carvedilol (COREG) 3 125 mg tablet Take 1 tablet (3 125 mg total) by mouth 2 (two) times a day with meals 180 tablet 1    glimepiride (AMARYL) 4 mg tablet Take 1 tablet (4 mg total) by mouth daily with breakfast 90 tablet 1    lisinopril (ZESTRIL) 10 mg tablet Take 1 tablet (10 mg total) by mouth daily 90 tablet 1    metFORMIN (GLUCOPHAGE) 1000 MG tablet Take 1 tablet (1,000 mg total) by mouth 2 (two) times a day with meals 180 tablet 1    nicotine (NICODERM CQ) 21 mg/24 hr TD 24 hr patch Place 1 patch on the skin every 24 hours 28 patch 0    Omega-3 Fatty Acids (fish oil) 1,000 mg Take 1 capsule (1,000 mg total) by mouth 2 (two) times a day 180 capsule 2    ticagrelor (BRILINTA) 90 MG Take 1 tablet (90 mg total) by mouth every 12 (twelve) hours 60 tablet 5     No current facility-administered medications for this visit  Allergies   Allergen Reactions    Amoxicillin        Review of Systems   Constitutional: Negative for chills and fever  Respiratory: Negative for cough and shortness of breath  Cardiovascular: Negative for chest pain and palpitations  Gastrointestinal: Negative for abdominal pain, constipation, diarrhea, nausea and vomiting  There were no vitals filed for this visit  I spent 15 minutes directly with the patient during this visit    VIRTUAL VISIT Opal Rosario acknowledges that he has consented to an online visit or consultation   He understands that the online visit is based solely on information provided by him, and that, in the absence of a face-to-face physical evaluation by the physician, the diagnosis he receives is both limited and provisional in terms of accuracy and completeness  This is not intended to replace a full medical face-to-face evaluation by the physician  Sunita Gloria understands and accepts these terms

## 2021-03-10 ENCOUNTER — CONSULT (OUTPATIENT)
Dept: BARIATRICS | Facility: CLINIC | Age: 41
End: 2021-03-10
Payer: COMMERCIAL

## 2021-03-10 VITALS
HEIGHT: 67 IN | WEIGHT: 315 LBS | SYSTOLIC BLOOD PRESSURE: 140 MMHG | BODY MASS INDEX: 49.44 KG/M2 | TEMPERATURE: 96 F | DIASTOLIC BLOOD PRESSURE: 80 MMHG | HEART RATE: 85 BPM

## 2021-03-10 DIAGNOSIS — I10 ESSENTIAL HYPERTENSION: ICD-10-CM

## 2021-03-10 DIAGNOSIS — IMO0002 DM TYPE 2, UNCONTROLLED, WITH NEUROPATHY: ICD-10-CM

## 2021-03-10 DIAGNOSIS — E78.1 HYPERTRIGLYCERIDEMIA: ICD-10-CM

## 2021-03-10 DIAGNOSIS — I25.10 CAD (CORONARY ARTERY DISEASE): ICD-10-CM

## 2021-03-10 DIAGNOSIS — E66.01 CLASS 3 SEVERE OBESITY DUE TO EXCESS CALORIES WITH SERIOUS COMORBIDITY AND BODY MASS INDEX (BMI) OF 50.0 TO 59.9 IN ADULT (HCC): ICD-10-CM

## 2021-03-10 DIAGNOSIS — I21.4 TYPE 1 NON-ST ELEVATION MYOCARDIAL INFARCTION (NSTEMI) (HCC): ICD-10-CM

## 2021-03-10 DIAGNOSIS — E78.5 DYSLIPIDEMIA: ICD-10-CM

## 2021-03-10 DIAGNOSIS — Z01.818 ENCOUNTER FOR OTHER PREPROCEDURAL EXAMINATION: Primary | ICD-10-CM

## 2021-03-10 DIAGNOSIS — E66.01 MORBID (SEVERE) OBESITY DUE TO EXCESS CALORIES (HCC): ICD-10-CM

## 2021-03-10 PROBLEM — E66.813 CLASS 3 SEVERE OBESITY DUE TO EXCESS CALORIES WITH SERIOUS COMORBIDITY AND BODY MASS INDEX (BMI) OF 50.0 TO 59.9 IN ADULT (HCC): Status: ACTIVE | Noted: 2021-03-10

## 2021-03-10 PROCEDURE — 99204 OFFICE O/P NEW MOD 45 MIN: CPT | Performed by: SURGERY

## 2021-03-10 NOTE — LETTER
March 10, 2021     Lewis Douglass DO  [address] [facility] [address]    Patient: Denia Modi   YOB: 1980   Date of Visit: 3/10/2021       Dear Dr Gladys Ya: Thank you for referring Miriam Starr to me for evaluation for metabolic and bariatric surgery  Below are my notes for this consultation  If you have questions, please do not hesitate to call me  I look forward to following your patient along with you  Sincerely,        Gin Goddard MD        CC: Gearld Haven, MD Imagene Oppenheim, DO Valeri Brash, MD  3/10/2021  4:22 PM  Sign when Signing Visit      [address] [facility] - [address] [facility] Marlys 45 y o  male MRN: [id number]  Unit/Bed#:  Encounter: [id number]      HPI:  Denia Modi is a 45 y o  male who presents with a longstanding history of morbid obesity and inability to sustain a meaningful weight loss  He is a   He desires to pursue metabolic and bariatric surgery to improve his health, resolve his diabetes and to make removing the mass on his left leg safer  +Tobacco (will quit)  +NSAIDs (will quit)  Denies GERD  Denies DVT/PE  DM II (2 meds, ~5-6 yrs)  Deferred   Here today to discuss bariatric options  Visit type: initial visit    Symptoms: inability to loss weight, dysnea, edema, knee pain and back pain    Associated Symptoms: none    Associated Conditions: glucose intolerance, hyperlipidemia, low HDL, elevated LDL, elevated triglycerides and abdominal obesity  Disease Complications: diabetes, hypertension, sleep apnea, osteoarthritis and metabolic syndrome  Weight Loss Interest: high    Exercise Frequency:daily  Types of Exercise: walking    Review of Systems   Respiratory: Positive for shortness of breath  Cardiovascular: Positive for leg swelling  Musculoskeletal: Positive for arthralgias and back pain     Skin:        L leg mass        Historical Information   Past Medical History: Diagnosis Date    Coronary artery disease     Diabetes mellitus ([facility] )     Heart disease     CAD   s/p ptca with 1 stent 2012    Hidradenitis suppurativa     groin    Hyperlipidemia     Hypertension     MI (myocardial infarction) ([facility] )     " silent " M I  in the past    Morbid obesity with BMI of 50 0-59 9, adult ([facility] )     Myocardial infarction ([facility] )     Nicotine dependence     Obesity     Pilonidal cyst      Past Surgical History:   Procedure Laterality Date    CORONARY ANGIOPLASTY WITH STENT PLACEMENT      INCISION AND DRAINAGE OF WOUND N/A 1/24/2017    Procedure: INCISION AND DRAINAGE (I&D) BUTTOCK, PILONIDAL CYST;  Surgeon: Julio Gallegos MD;  Location: [address] [place] [address];  Service:    [facility] LEG SURGERY Left     I&D of left leg 2012     Social History   Social History     Substance and Sexual Activity   Alcohol Use Not Currently    Comment: rarely     Social History     Substance and Sexual Activity   Drug Use No     Social History     Tobacco Use   Smoking Status Current Every Day Smoker    Packs/day: 1 00    Years: 20 00    Pack years: 20 00    Types: Cigarettes   Smokeless Tobacco Never Used     Family History: DM II, heart disease, Morbid obesity    Meds/Allergies   all medications and allergies reviewed  Allergies   Allergen Reactions    Amoxicillin        Objective     Current Vitals:   /80   Pulse 85   Temp (!) 96 °F (35 6 °C)   Ht 5' 6 75" (1 695 m)   Wt (!) 167 kg (367 lb 9 6 oz)   BMI 58 01 kg/m²     Invasive Devices     None                 Physical Exam  Constitutional:       Appearance: Normal appearance  HENT:      Head: Atraumatic  Nose: No rhinorrhea  Eyes:      Extraocular Movements: Extraocular movements intact  Neck:      Musculoskeletal: Normal range of motion  Cardiovascular:      Rate and Rhythm: Normal rate  Pulmonary:      Effort: Pulmonary effort is normal  No respiratory distress  Abdominal:      General: Abdomen is flat  There is no distension  Palpations: Abdomen is soft  Tenderness: There is no abdominal tenderness  Genitourinary:     Comments: Deferred  Musculoskeletal: Normal range of motion  Skin:     General: Skin is warm and dry  Neurological:      General: No focal deficit present  Mental Status: He is alert and oriented to person, place, and time  Psychiatric:         Mood and Affect: Mood normal          Behavior: Behavior normal          Lab Results: I have personally reviewed pertinent lab results  Imaging: I have personally reviewed pertinent reports  EKG, Pathology, and Other Studies: I have personally reviewed pertinent reports  Assessment/PLAN:    45 y o  yo male with a long standing h/o of obesity and inability to sustain any meaningful weight loss on his own despite several attempts  He is interested in the Laparoscopic fer-en-y gastric bypass (possible sleeve gastrectomy - tob)  As a part of his pre op evaluation, he will be referred to a cardiologist and for a sleep evaluation and consult after successfully completing an evaluation with our pre-certification/, registered dietician and licensed clinical   He will also be referred to Dr Edy Houston ([address] [place] [address])  He needs an EGD to evaluate the anatomy of his GI tract  I have spent over 45 minutes with him face to face in the office today discussing his options and details of the surgery  We have seen an animation of the surgery on the computer that illustrates how the operation is done and how the anatomy will be altered with the procedure  Over 50% of this was coordinating care  I have discussed and educated the patient with regards to the components of our multidisciplinary program and the importance of compliance and follow up in the post operative period  He was given the opportunity to ask questions and I have answered all of them      The patient was also instructed with regards to the importance of behavior modification, nutritional counseling, support meeting attendance and lifestyle changes that are important to ensure success  Although there is a great statistical chance of improvement or even resolution of most of his associated comorbidities, the results vary from patient to patient and they largely depend on his commitment and compliance  Weight loss goal 36 lbs (331 lbs day of surgery)        Delmy Samano MD  3/10/2021  3:56 PM

## 2021-03-10 NOTE — PROGRESS NOTES
BARIATRIC INITIAL CONSULT - BARIATRIC SURGERY    Macho Cunningham 45 y o  male MRN: [id number]  Unit/Bed#:  Encounter: [id number]      HPI:  Macho Cunningham is a 45 y o  male who presents with a longstanding history of morbid obesity and inability to sustain a meaningful weight loss  He is a   He desires to pursue metabolic and bariatric surgery to improve his health, resolve his diabetes and to make removing the mass on his left leg safer  +Tobacco (will quit)  +NSAIDs (will quit)  Denies GERD  Denies DVT/PE  DM II (2 meds, ~5-6 yrs)  Deferred   Here today to discuss bariatric options  Visit type: initial visit    Symptoms: inability to loss weight, dysnea, edema, knee pain and back pain    Associated Symptoms: none    Associated Conditions: glucose intolerance, hyperlipidemia, low HDL, elevated LDL, elevated triglycerides and abdominal obesity  Disease Complications: diabetes, hypertension, sleep apnea, osteoarthritis and metabolic syndrome  Weight Loss Interest: high    Exercise Frequency:daily  Types of Exercise: walking    Review of Systems   Respiratory: Positive for shortness of breath  Cardiovascular: Positive for leg swelling  Musculoskeletal: Positive for arthralgias and back pain     Skin:        L leg mass        Historical Information   Past Medical History:   Diagnosis Date    Coronary artery disease     Diabetes mellitus ([facility] )     Heart disease     CAD   s/p ptca with 1 stent 2012    Hidradenitis suppurativa     groin    Hyperlipidemia     Hypertension     [facility]ocardial infarction) ([facility]     " silent " M I  in the past    Morbid obesity with BMI of 50 0-59 9, adult [facility]     [facility]     Nicotine dependence     Obesity     Pilonidal cyst      Past Surgical History:   Procedure Laterality Date    CORONARY ANGIOPLASTY WITH STENT PLACEMENT      INCISION AND DRAINAGE OF WOUND N/A 1/24/2017    Procedure: INCISION AND DRAINAGE (I&D) BUTTOCK, PILONIDAL CYST;  Surgeon: Ashlee Power MD;  Location: [address] [place] [address];  Service:    Cathlene Salon LEG SURGERY Left     I&D of left leg 2012     Social History   Social History     Substance and Sexual Activity   Alcohol Use Not Currently    Comment: rarely     Social History     Substance and Sexual Activity   Drug Use No     Social History     Tobacco Use   Smoking Status Current Every Day Smoker    Packs/day: 1 00    Years: 20 00    Pack years: 20 00    Types: Cigarettes   Smokeless Tobacco Never Used     Family History: DM II, heart disease, Morbid obesity    Meds/Allergies   all medications and allergies reviewed  Allergies   Allergen Reactions    Amoxicillin        Objective     Current Vitals:   /80   Pulse 85   Temp (!) 96 °F (35 6 °C)   Ht 5' 6 75" (1 695 m)   Wt (!) 167 kg (367 lb 9 6 oz)   BMI 58 01 kg/m²     Invasive Devices     None                 Physical Exam  Constitutional:       Appearance: Normal appearance  HENT:      Head: Atraumatic  Nose: No rhinorrhea  Eyes:      Extraocular Movements: Extraocular movements intact  Neck:      Musculoskeletal: Normal range of motion  Cardiovascular:      Rate and Rhythm: Normal rate  Pulmonary:      Effort: Pulmonary effort is normal  No respiratory distress  Abdominal:      General: Abdomen is flat  There is no distension  Palpations: Abdomen is soft  Tenderness: There is no abdominal tenderness  Genitourinary:     Comments: Deferred  Musculoskeletal: Normal range of motion  Skin:     General: Skin is warm and dry  Neurological:      General: No focal deficit present  Mental Status: He is alert and oriented to person, place, and time  Psychiatric:         Mood and Affect: Mood normal          Behavior: Behavior normal          Lab Results: I have personally reviewed pertinent lab results  Imaging: I have personally reviewed pertinent reports  EKG, Pathology, and Other Studies: I have personally reviewed pertinent reports  Assessment/PLAN:    45 y o  yo male with a long standing h/o of obesity and inability to sustain any meaningful weight loss on his own despite several attempts  He is interested in the Laparoscopic fer-en-y gastric bypass (possible sleeve gastrectomy - tob)  As a part of his pre op evaluation, he will be referred to a cardiologist and for a sleep evaluation and consult after successfully completing an evaluation with our pre-certification/, registered dietician and licensed clinical   He will also be referred to Dr Nory Power ([address] [place] [address])  He needs an EGD to evaluate the anatomy of his GI tract  I have spent over 45 minutes with him face to face in the office today discussing his options and details of the surgery  We have seen an animation of the surgery on the computer that illustrates how the operation is done and how the anatomy will be altered with the procedure  Over 50% of this was coordinating care  I have discussed and educated the patient with regards to the components of our multidisciplinary program and the importance of compliance and follow up in the post operative period  He was given the opportunity to ask questions and I have answered all of them  The patient was also instructed with regards to the importance of behavior modification, nutritional counseling, support meeting attendance and lifestyle changes that are important to ensure success  Although there is a great statistical chance of improvement or even resolution of most of his associated comorbidities, the results vary from patient to patient and they largely depend on his commitment and compliance  Weight loss goal 36 lbs (331 lbs day of surgery)        Nelli Shaver MD  3/10/2021  3:56 PM

## 2021-03-15 ENCOUNTER — TELEPHONE (OUTPATIENT)
Dept: CARDIOLOGY CLINIC | Facility: CLINIC | Age: 41
End: 2021-03-15

## 2021-03-15 ENCOUNTER — OFFICE VISIT (OUTPATIENT)
Dept: CARDIOLOGY CLINIC | Facility: CLINIC | Age: 41
End: 2021-03-15
Payer: COMMERCIAL

## 2021-03-15 VITALS
TEMPERATURE: 97.5 F | SYSTOLIC BLOOD PRESSURE: 143 MMHG | WEIGHT: 315 LBS | RESPIRATION RATE: 18 BRPM | HEART RATE: 78 BPM | BODY MASS INDEX: 50.62 KG/M2 | DIASTOLIC BLOOD PRESSURE: 80 MMHG | OXYGEN SATURATION: 95 % | HEIGHT: 66 IN

## 2021-03-15 DIAGNOSIS — I10 ESSENTIAL HYPERTENSION: ICD-10-CM

## 2021-03-15 DIAGNOSIS — E78.5 DYSLIPIDEMIA: ICD-10-CM

## 2021-03-15 DIAGNOSIS — I25.10 CORONARY ARTERY DISEASE INVOLVING NATIVE CORONARY ARTERY OF NATIVE HEART WITHOUT ANGINA PECTORIS: Primary | ICD-10-CM

## 2021-03-15 PROCEDURE — 99214 OFFICE O/P EST MOD 30 MIN: CPT | Performed by: INTERNAL MEDICINE

## 2021-03-15 RX ORDER — ATORVASTATIN CALCIUM 80 MG/1
80 TABLET, FILM COATED ORAL DAILY
Qty: 90 TABLET | Refills: 2 | Status: SHIPPED | OUTPATIENT
Start: 2021-03-15 | End: 2022-04-20 | Stop reason: SDUPTHER

## 2021-03-15 RX ORDER — CARVEDILOL 6.25 MG/1
6.25 TABLET ORAL 2 TIMES DAILY WITH MEALS
Qty: 120 TABLET | Refills: 2 | Status: SHIPPED | OUTPATIENT
Start: 2021-03-15 | End: 2022-04-20 | Stop reason: SDUPTHER

## 2021-03-15 NOTE — TELEPHONE ENCOUNTER
----- Message from Johnny Haynes MD sent at 3/15/2021 10:53 AM EDT -----  Please call and inform patient that his blood test showed that his liver enzymes are normal     His bad cholesterol, LDL is elevated at 86, target is less than 70  Recommend increase atorvastatin to 80 mg daily and   repeat lipid profile and Liver Enzymes/Hepatic Panel in 3 months

## 2021-03-15 NOTE — PROGRESS NOTES
Progress Note - Cardiology Office  [facility]    Miquel Adhikari 45 y o  male MRN: [id number]  : 1980  Encounter: [id number]      ASSESSMENT:   S/P NSTEMI 2020, peak troponin was 0 72  Had catheterization and PCI/SABINE of RCA on 2020  Currently chest pain-free and denies any unusual dyspnea     Coronary artery disease,   S/p NSTEMI 2020, peak troponin was 0 72  s/p PCI of RCA on 2020,  History of PCI of LCX in   On aspirin, Brilinta, atorvastatin, lisinopril, Coreg     ECHO:  20 20:  EF 60%    Hypertension:    BP is 143/80 mmHg with heart rate of 78  On amlodipine, Coreg, lisinopril     Hyperlipidemia:  Target LDL is< 70  Currently on atorvastatin 40 mg  2021 LDL was 86, liver enzymes are normal    Type 2 diabetes mellitus:  Managed by PCP     History of Left groin cellulitis/mass:  Management as per PCP     Chronic Tobacco Abuse  Still Smoking! RECOMMENDATIONS:  Increase atorvastatin to 80 mg daily  lipid profile and LFTs in 3 months  advised to avoid any alcohol consumption  advised to quit smoking  increase Coreg to 6 25 mg b i d  Please call [phone] if any questions  HPI :     Miquel Adhikari is a 45y o  year old male who came for follow up  Has no cardiac symptoms today  He denies chest pain, unusual dyspnea, or syncope  His blood pressure is elevated for which we are going to increase his Coreg  His LDL was 86, target being less than 70, therefore we will increase his atorvastatin and repeat labs in 3 months  Patient continues to smoke, and I strongly advised to quit  He has a history of Coronary artery disease, non ST-elevation MI, and PCI with drug-eluting stents on 2020 and has been on dual antiplatelet therapy for a little more than 3 months only    REVIEW OF SYSTEMS:  Denies chest pain, unusual dyspnea, or syncope    Has left groin soft tissue mass    Historical Information   Past Medical History: Diagnosis Date    Coronary artery disease     Diabetes mellitus ([place] [address] )     Heart disease     CAD   s/p ptca with 1 stent 2012    Hidradenitis suppurativa     groin    Hyperlipidemia     Hypertension     MI (myocardial infarction) ([place] [address] )     " silent " M I  in the past    Morbid obesity with BMI of 50 0-59 9, adult ([place] [address] )     Myocardial infarction ([place] [address] )     Nicotine dependence     Obesity     Pilonidal cyst      Past Surgical History:   Procedure Laterality Date    CORONARY ANGIOPLASTY WITH STENT PLACEMENT      INCISION AND DRAINAGE OF WOUND N/A 1/24/2017    Procedure: INCISION AND DRAINAGE (I&D) BUTTOCK, PILONIDAL CYST;  Surgeon: Jair Eckert MD;  Location: [address] [place] [address];  Service:    Reddy Moose LEG SURGERY Left     I&D of left leg 2012     Social History     Substance and Sexual Activity   Alcohol Use Not Currently    Comment: rarely     Social History     Substance and Sexual Activity   Drug Use No     Social History     Tobacco Use   Smoking Status Current Every Day Smoker    Packs/day: 1 00    Years: 20 00    Pack years: 20 00    Types: Cigarettes   Smokeless Tobacco Never Used     Family History:   Family History   Problem Relation Age of Onset    Heart disease Father        Meds/Allergies     Allergies   Allergen Reactions    Amoxicillin        Current Outpatient Medications:     amLODIPine (NORVASC) 10 mg tablet, take 1 tablet by mouth once daily, Disp: 90 tablet, Rfl: 0    aspirin (ECOTRIN LOW STRENGTH) 81 mg EC tablet, Take 1 tablet (81 mg total) by mouth daily, Disp: 30 tablet, Rfl: 0    atorvastatin (LIPITOR) 80 mg tablet, Take 1 tablet (80 mg total) by mouth daily, Disp: 90 tablet, Rfl: 2    carvedilol (COREG) 3 125 mg tablet, Take 1 tablet (3 125 mg total) by mouth 2 (two) times a day with meals, Disp: 180 tablet, Rfl: 1    glimepiride (AMARYL) 4 mg tablet, Take 1 tablet (4 mg total) by mouth daily with breakfast, Disp: 90 tablet, Rfl: 1    lisinopril (ZESTRIL) 10 mg tablet, Take 1 tablet (10 mg total) by mouth daily, Disp: 90 tablet, Rfl: 1    metFORMIN (GLUCOPHAGE) 1000 MG tablet, Take 1 tablet (1,000 mg total) by mouth 2 (two) times a day with meals, Disp: 180 tablet, Rfl: 1    nicotine (NICODERM CQ) 21 mg/24 hr TD 24 hr patch, Place 1 patch on the skin every 24 hours, Disp: 28 patch, Rfl: 0    Omega-3 Fatty Acids (fish oil) 1,000 mg, Take 1 capsule (1,000 mg total) by mouth 2 (two) times a day, Disp: 180 capsule, Rfl: 2    ticagrelor (BRILINTA) 90 MG, Take 1 tablet (90 mg total) by mouth every 12 (twelve) hours, Disp: 60 tablet, Rfl: 5    Vitals:   /80 mmHg  HR 78/Min  BP Readings from Last 3 Encounters:   03/10/21 140/80   21 126/58   21 132/90       Physical Exam:  Neurologic:  Alert & oriented x 3, no new focal deficits, Not in any acute distress,  Constitutional:  Severely obese  Eyes:  Pupil equal and reacting to light, conjunctiva normal,   HENT:  Atraumatic, oropharynx moist, Neck- normal range of motion, no tenderness,  Neck supple, No JVP, No LNP   Respiratory:  Bilateral air entry, mostly clear to auscultation  Cardiovascular: S1-S2 regular with a I/VI ejection systolic murmur   GI:  Soft, nondistended, normal bowel sounds, nontender, no hepatosplenomegaly appreciated  Musculoskeletal:  No edema, no tenderness, no deformities     Skin:  Well hydrated, no rash   Lymphatic:  No lymphadenopathy noted   Extremities:  Left groin soft tissue mass    Diagnostic Studies Review Cardio:    Cardiac testing:   Results for orders placed during the hospital encounter of 20   Echo complete with contrast if indicated    Narrative Maria Elena [address]  [address]  [phone]    Transthoracic Echocardiogram  2D, M-mode, Doppler, and Color Doppler    Study date:  2020    Patient: Rayo Bustamante  MR number: [id number]  Account number: [de-identified]  : 1980  Age: 45 years  Gender: Male  Status: Inpatient  Location: Bedside  Height: 69 in  Weight: 374 lb  BP: 145/ 85 mmHg    Indications: CAD  Diagnoses: I21 4 - Non-ST elevation (NSTEMI) myocardial infarction    Sonographer:  Rachelle Guallpa RDCS  Referring Physician:  Donato Bell MD  Group:  [facility]  Cardiology Fellow: Elisa Mitchell MD  Interpreting Physician:  Linda Rogers MD    SUMMARY    PROCEDURE INFORMATION:  Echocardiographic views were limited due to poor acoustic window availability, decreased penetration, and lung interference  Intravenous contrast ( 0 8mL/min of Definity in NSS) was administered to opacify the left ventricle  LEFT VENTRICLE:  Size was normal   Systolic function was normal  Ejection fraction was estimated to be 60 %  There were no regional wall motion abnormalities  Left ventricular diastolic function parameters were normal     RIGHT VENTRICLE:  The size was normal   Systolic function was normal     HISTORY: PRIOR HISTORY: Hypertension  DM2  Chest pain  Obesity  Dyslipidemia  MI  Smoker  PROCEDURE: The procedure was performed at the bedside  This was a routine study  The transthoracic approach was used  The study included complete 2D imaging, M-mode, complete spectral Doppler, and color Doppler  The heart rate was 74 bpm,  at the start of the study  Images were obtained from the parasternal, apical, subcostal, and suprasternal notch acoustic windows  Intravenous contrast ( 0 8mL/min of Definity in NSS) was administered to opacify the left ventricle  Echocardiographic views were limited due to poor acoustic window availability, decreased penetration, and lung interference  This was a technically difficult study  LEFT VENTRICLE: Size was normal  Systolic function was normal  Ejection fraction was estimated to be 60 %  There were no regional wall motion abnormalities  Wall thickness was normal  There was no evidence of concentric hypertrophy    DOPPLER: Left ventricular diastolic function parameters were normal     RIGHT VENTRICLE: The size was normal  Systolic function was normal     LEFT ATRIUM: Size was within the limits of normal when indexed for body surface area  RIGHT ATRIUM: Size was normal     MITRAL VALVE: Valve structure was normal  There was normal leaflet separation  DOPPLER: The transmitral velocity was within the normal range  There was no evidence for stenosis  There was no significant regurgitation  AORTIC VALVE: The valve was trileaflet  Leaflets exhibited normal thickness and normal cuspal separation  DOPPLER: Transaortic velocity was minimally increased  There was no evidence for stenosis  There was no significant regurgitation  TRICUSPID VALVE: The valve structure was normal  There was normal leaflet separation  DOPPLER: The transtricuspid velocity was within the normal range  There was no evidence for stenosis  There was trace regurgitation  The tricuspid jet  envelope definition was inadequate for estimation of RV systolic pressure  PULMONIC VALVE: DOPPLER: The transpulmonic velocity was within the normal range  There was trace regurgitation  PERICARDIUM: There was no pericardial effusion  AORTA: The root exhibited normal size  SYSTEMIC VEINS: IVC: The inferior vena cava was not well visualized  SYSTEM MEASUREMENT TABLES    2D  %FS: 29 39 %  Ao Diam: 2 84 cm  EDV(Teich): 145 63 ml  EF(Teich): 55 75 %  ESV(Teich): 64 44 ml  IVSd: 1 15 cm  LA Area: 21 58 cm2  LA Diam: 4 15 cm  LVIDd: 5 47 cm  LVIDs: 3 86 cm  LVPWd: 0 99 cm  RA Area: 23 24 cm2  RVIDd: 4 55 cm  SV(Teich): 81 19 ml    CW  AV Env  Ti: 304 47 ms  AV VTI: 42 06 cm  AV Vmax: 1 91 m/s  AV Vmean: 1 38 m/s  AV maxP 65 mmHg  AV meanP 46 mmHg    MM  TAPSE: 2 34 cm    PW  E' Sept: 0 13 m/s  E/E' Sept: 7 93  LVOT Env  Ti: 255 ms  LVOT VTI: 20 18 cm  LVOT Vmax: 1 15 m/s  LVOT Vmean: 0 79 m/s  LVOT maxP 3 mmHg  LVOT meanP 9 mmHg  MV A Flavio: 1 04 m/s  MV Dec [place]: 4 02 m/s2  MV DecT: 263 09 ms  MV E Flavio: 1 06 m/s  MV E/A Ratio: 1 02  MV PHT: 76 3 ms  MVA By PHT: 2 88 cm2    [employer] Accredited Echocardiography Laboratory    Prepared and electronically signed by    Krista Abbott MD  Signed [id number] 12:44:26       No results found for this or any previous visit  Results for orders placed during the hospital encounter of 20   Cardiac catheterization    Narrative Maria Elena 175  [address] [facility], [address] [place] [address]  [phone]    [facility]    Invasive Cardiovascular Lab Complete Report    Patient: Keshia Salomon  MR number: [id number]  Account number: [de-identified]  Study date: 2020  Gender: Male  : 1980  Height: 68 9 in  Weight: 374 lb  BSA: 2 69 mï¾²    Allergies: AMOXICILLIN    Diagnostic Cardiologist:  Hayley Salmeron DO  Interventional Cardiologist:  Hayley Salmeron DO    SUMMARY    CORONARY CIRCULATION:  There was 1-vessel coronary artery disease  Mid RCA: There was a 80 % stenosis  1ST LESION INTERVENTIONS:  A balloon angioplasty with stent procedure was performed on the 80 % lesion in the mid RCA  Following intervention there was a 0 % residual stenosis  A Resolute Joshua Rx 3 0 x 12mm drug-eluting stent was placed across the lesion and deployed at a maximum inflation pressure of 14 sarah  INDICATIONS:  --  Possible CAD: unstable angina  PROCEDURES PERFORMED    --  Left coronary angiography  --  Right coronary angiography  --  Inpatient  --  Mod Sedation Same Physician Initial 15min  --  Mod Sedation Same Physician Add 15min  --  Coronary Catheterization (w/o LHC)  --  Mod Sedation Same Physician Add 15min  --  Coronary Drug Eluting Stent w/PTCA  --  Intervention on mid RCA: balloon angioplasty, stent  PROCEDURE: The risks and alternatives of the procedures and conscious sedation were explained to the patient and informed consent was obtained   The patient was brought to the cath lab and placed on the table  The planned puncture sites  were prepped and draped in the usual sterile fashion  --  Right radial artery access  After performing an Ravi's test to verify adequate ulnar artery supply to the hand, the radial site was prepped  The puncture site was infiltrated with local anesthetic  The vessel was accessed using the  modified Seldinger technique, a wire was advanced into the vessel, and a sheath was advanced over the wire into the vessel  --  Left coronary artery angiography  A catheter was advanced over a guidewire into the aorta and positioned in the left coronary artery ostium under fluoroscopic guidance  Angiography was performed  --  Right coronary artery angiography  A catheter was advanced over a guidewire into the aorta and positioned in the right coronary artery ostium under fluoroscopic guidance  Angiography was performed  --  Inpatient  --  Mod Sedation Same Physician Initial 15min  --  Mod Sedation Same Physician Add 15min  --  Coronary Catheterization (w/o [facility])  --  Mod Sedation Same Physician Add 15min  LESION INTERVENTION: A balloon angioplasty with stent procedure was performed on the 80 % lesion in the mid RCA  Following intervention there was a 0 % residual stenosis  There was HERNAN 3 flow before the procedure and HERNAN 3 flow after the  procedure  --  Vessel setup was performed  A 6Fr  Launcher AR1 guiding catheter was used to cannulate the vessel  --  Vessel setup was performed  A Runthrough NS 180cm wire was used to cross the lesion  --  Balloon angioplasty was performed, using a Trek Rx 2 5 x 12mm balloon, with 2 inflations and a maximum inflation pressure of 12 sarah  --  A Resolute [place] Rx 3 0 x 12mm drug-eluting stent was placed across the lesion and deployed at a maximum inflation pressure of 14 sarah  INTERVENTIONS:  --  Coronary Drug Eluting Stent w/PTCA      PROCEDURE COMPLETION: The patient tolerated the procedure well and was discharged from the cath lab  TIMING: Test started at 11:08  Test concluded at 11:49  HEMOSTASIS: The sheath was removed  The site was compressed with a Hemoband  device  Hemostasis was obtained  MEDICATIONS GIVEN: Versed (2mg/2ml), 2 mg, IV, at 11:02  Fentanyl (1OOmcg/2 ml), 50 mcg, IV, at 11:02  1% Lidocaine, 1 ml, subcutaneously, at 11:09  Nitroglycerin (200mcg/ml), 200 mcg, at 11:13  Verapamil  (5mg/2ml), 2 5 mg, IV, at 11:14  Heparin 1000 units/ml, 4,000 units, IV, at 11:14  Fentanyl (1OOmcg/2 ml), 50 mcg, IV, at 11:14  Versed (2mg/2ml), 1 mg, IV, at 11:14  Ticagrelor, 180 mg, PO, at 11:28  Heparin 1000 units/ml, 8,000 units,  IV, at 11:29  Versed (2mg/2ml), 1 mg, IV, at 11:39  Fentanyl (1OOmcg/2 ml), 25 mcg, IV, at 11:39  Fentanyl (1OOmcg/2 ml), 25 mcg, IV, at 11:41  Heparin 1000 units/ml, 2,000 units, IV, at 11:49  CONTRAST GIVEN: 130 ml Omnipaque (350 mg I  /ml)  RADIATION EXPOSURE: Fluoroscopy time: 9 95 min  CORONARY VESSELS:   --  The coronary circulation is right dominant  --  There was 1-vessel coronary artery disease  --  Left main: Angiography showed minor luminal irregularities  --  LAD: Angiography showed minor luminal irregularities  --  Mid circumflex: There was a 30 % stenosis  --  RCA: The vessel was excessively tortuous  --  Proximal RCA: There was a 30 % stenosis  --  Mid RCA: There was a 80 % stenosis  --  Distal RCA: There was a 40 % stenosis  IMPRESSIONS:  1V CAD/RCA with SABINE placement mid RCA    RECOMMENDATIONS  weight loss, smoking cessation, DM control, dapt x 1 year, gdmt cad    DISPOSITION:  The patient left the catheterization laboratory in stable condition  Prepared and signed by    Margoth Cole DO  Signed 12/01/2020 11:54:00    Study diagram    Angiographic findings  Native coronary lesions:  ï¾·Mid circumflex: Lesion 1: 30 % stenosis  ï¾·Proximal RCA: Lesion 1: 30 % stenosis  ï¾·Mid RCA: Lesion 1: 80 % stenosis  ï¾·Distal RCA: Lesion 1: 40 % stenosis    Intervention results  Native coronary lesions:  ï¾·balloon angioplasty and stent of the 80 % stenosis in mid RCA  0 % residual stenosis  Stent: Resolute Joshua Rx 3 0 x 12mm drug-eluting  Hemodynamic tables    Pressures:  Baseline  Pressures:  - HR: 77  Pressures:  - Rhythm:  Pressures:  -- Aortic Pressure (S/D/M): 160/100/126    Outputs:  Baseline  Outputs:  -- CALCULATIONS: [age]  Outputs:  -- CALCULATIONS: Body Surface Area: 2 69  Outputs:  -- CALCULATIONS: Height in cm: 175 00  Outputs:  -- CALCULATIONS: Sex: Male  Outputs:  -- CALCULATIONS: Weight in k 00       No results found for this or any previous visit  No results found for this or any previous visit  No results found for this or any previous visit  Imaging:  Chest X-Ray:   No Chest XR results available for this patient  CT-scan of the chest:     No CTA results available for this patient    Lab Review   Lab Results   Component Value Date    WBC 7 05 2021    HGB 10 4 (L) 2021    HCT 33 9 (L) 2021    MCV 88 2021    RDW 13 2 2021     2021     BMP:  Lab Results   Component Value Date    SODIUM 136 2021    K 4 0 2021     2021    CO2 24 2021    BUN 16 2021    CREATININE 1 05 2021    GLUC 125 2021    GLUF 85 2021    CALCIUM 8 6 2021    CORRECTEDCA 10 2 (H) 2021    EGFR 89 2021    MG 1 7 2021     LFT:  Lab Results   Component Value Date    AST 19 2021    ALT 21 2021    ALKPHOS 96 2021    TP 7 8 2021    ALB 2 0 (L) 2021      No components found for: TSH3  No results found for: [facility]  Lab Results   Component Value Date    HGBA1C 8 4 (H) 2020     Lipid Profile:   Lab Results   Component Value Date    CHOLESTEROL 195 2020    HDL 29 (L) 2020    LDLCALC 134 (H) 2020    TRIG 161 (H) 2020     Lab Results   Component Value Date    CHOLESTEROL 195 2020     Lab Results   Component Value Date    TROPONINI <0 02 02/22/2021     No results found for: NTBNP   Recent Results (from the past 672 hour(s))   ECG 12 lead    Collection Time: 02/22/21  9:47 AM   Result Value Ref Range    Ventricular Rate 63 BPM    Atrial Rate 63 BPM    RI Interval 138 ms    QRSD Interval 84 ms    QT Interval 404 ms    QTC Interval 413 ms    P [place] 57 degrees    QRS Axis 8 degrees    T Wave Axis 3 degrees   CBC and differential    Collection Time: 02/22/21 10:02 AM   Result Value Ref Range    WBC 20 76 (H) 4 31 - 10 16 Thousand/uL    RBC 4 41 3 88 - 5 62 Million/uL    Hemoglobin 12 1 12 0 - 17 0 g/dL    Hematocrit 39 1 36 5 - 49 3 %    MCV 89 82 - 98 fL    MCH 27 4 26 8 - 34 3 pg    MCHC 30 9 (L) 31 4 - 37 4 g/dL    RDW 13 3 11 6 - 15 1 %    MPV 9 2 8 9 - 12 7 fL    Platelets 425 (H) 449 - 390 Thousands/uL    nRBC 0 /100 WBCs    Neutrophils Relative 84 (H) 43 - 75 %    Immat GRANS % 1 0 - 2 %    Lymphocytes Relative 7 (L) 14 - 44 %    Monocytes Relative 7 4 - 12 %    Eosinophils Relative 1 0 - 6 %    Basophils Relative 0 0 - 1 %    Neutrophils Absolute 17 64 (H) 1 85 - 7 62 Thousands/µL    Immature Grans Absolute 0 15 0 00 - 0 20 Thousand/uL    Lymphocytes Absolute 1 35 0 60 - 4 47 Thousands/µL    Monocytes Absolute 1 46 (H) 0 17 - 1 22 Thousand/µL    Eosinophils Absolute 0 11 0 00 - 0 61 Thousand/µL    Basophils Absolute 0 05 0 00 - 0 10 Thousands/µL   Protime-INR    Collection Time: 02/22/21 10:02 AM   Result Value Ref Range    Protime 14 5 11 6 - 14 5 seconds    INR 1 14 0 84 - 1 19   APTT    Collection Time: 02/22/21 10:02 AM   Result Value Ref Range    PTT 34 23 - 37 seconds   Comprehensive metabolic panel    Collection Time: 02/22/21 10:02 AM   Result Value Ref Range    Sodium 129 (L) 136 - 145 mmol/L    Potassium 4 2 3 5 - 5 3 mmol/L    Chloride 93 (L) 100 - 108 mmol/L    CO2 28 21 - 32 mmol/L    ANION GAP 8 4 - 13 mmol/L    BUN 33 (H) 5 - 25 mg/dL    Creatinine 1 97 (H) 0 60 - 1 30 mg/dL Glucose 104 65 - 140 mg/dL    Calcium 8 9 8 3 - 10 1 mg/dL    Corrected Calcium 10 1 8 3 - 10 1 mg/dL    AST 16 5 - 45 U/L    ALT 19 12 - 78 U/L    Alkaline Phosphatase 86 46 - 116 U/L    Total Protein 8 8 (H) 6 4 - 8 2 g/dL    Albumin 2 5 (L) 3 5 - 5 0 g/dL    Total Bilirubin 0 70 0 20 - 1 00 mg/dL    eGFR 42 ml/min/1 73sq m   Troponin I    Collection Time: 02/22/21 10:02 AM   Result Value Ref Range    Troponin I <0 02 <=0 04 ng/mL   Lactic acid    Collection Time: 02/22/21 10:02 AM   Result Value Ref Range    LACTIC ACID 0 9 0 5 - 2 0 mmol/L   Blood culture #1    Collection Time: 02/22/21 10:03 AM    Specimen: Hand, Right; Blood   Result Value Ref Range    Blood Culture No Growth After 5 Days  Blood culture #2    Collection Time: 02/22/21 10:08 AM    Specimen: Arm, Left; Blood   Result Value Ref Range    Blood Culture No Growth After 5 Days      Wound culture and Gram stain    Collection Time: 02/22/21  2:38 PM    Specimen: Abdominal; Wound   Result Value Ref Range    Wound Culture 3+ Growth of Beta Hemolytic Streptococcus Group C (A)     Wound Culture 2 colonies Staphylococcus coagulase negative (A)     Gram Stain Result 1+ Gram positive cocci in pairs (A)     Gram Stain Result 1+ Polys (A)    Fingerstick Glucose (POCT)    Collection Time: 02/22/21  4:03 PM   Result Value Ref Range    POC Glucose 53 (L) 65 - 140 mg/dl   Fingerstick Glucose (POCT)    Collection Time: 02/22/21 10:42 PM   Result Value Ref Range    POC Glucose 58 (L) 65 - 140 mg/dl   Fingerstick Glucose (POCT)    Collection Time: 02/22/21 11:36 PM   Result Value Ref Range    POC Glucose 122 65 - 140 mg/dl   Comprehensive metabolic panel    Collection Time: 02/23/21  5:06 AM   Result Value Ref Range    Sodium 133 (L) 136 - 145 mmol/L    Potassium 3 7 3 5 - 5 3 mmol/L    Chloride 100 100 - 108 mmol/L    CO2 23 21 - 32 mmol/L    ANION GAP 10 4 - 13 mmol/L    BUN 28 (H) 5 - 25 mg/dL    Creatinine 1 41 (H) 0 60 - 1 30 mg/dL    Glucose 85 65 - 140 mg/dL Glucose, Fasting 85 65 - 99 mg/dL    Calcium 8 1 (L) 8 3 - 10 1 mg/dL    Corrected Calcium 9 6 8 3 - 10 1 mg/dL    AST 17 5 - 45 U/L    ALT 13 12 - 78 U/L    Alkaline Phosphatase 82 46 - 116 U/L    Total Protein 7 7 6 4 - 8 2 g/dL    Albumin 2 1 (L) 3 5 - 5 0 g/dL    Total Bilirubin 0 40 0 20 - 1 00 mg/dL    eGFR 62 ml/min/1 73sq m   Magnesium    Collection Time: 02/23/21  5:06 AM   Result Value Ref Range    Magnesium 2 1 1 6 - 2 6 mg/dL   Phosphorus    Collection Time: 02/23/21  5:06 AM   Result Value Ref Range    Phosphorus 3 9 2 7 - 4 5 mg/dL   CBC and differential    Collection Time: 02/23/21  5:06 AM   Result Value Ref Range    WBC 11 99 (H) 4 31 - 10 16 Thousand/uL    RBC 3 75 (L) 3 88 - 5 62 Million/uL    Hemoglobin 10 4 (L) 12 0 - 17 0 g/dL    Hematocrit 33 2 (L) 36 5 - 49 3 %    MCV 89 82 - 98 fL    MCH 27 7 26 8 - 34 3 pg    MCHC 31 3 (L) 31 4 - 37 4 g/dL    RDW 13 2 11 6 - 15 1 %    MPV 9 4 8 9 - 12 7 fL    Platelets 534 405 - 460 Thousands/uL    nRBC 0 /100 WBCs    Neutrophils Relative 74 43 - 75 %    Immat GRANS % 1 0 - 2 %    Lymphocytes Relative 14 14 - 44 %    Monocytes Relative 9 4 - 12 %    Eosinophils Relative 2 0 - 6 %    Basophils Relative 0 0 - 1 %    Neutrophils Absolute 8 77 (H) 1 85 - 7 62 Thousands/µL    Immature Grans Absolute 0 07 0 00 - 0 20 Thousand/uL    Lymphocytes Absolute 1 71 0 60 - 4 47 Thousands/µL    Monocytes Absolute 1 12 0 17 - 1 22 Thousand/µL    Eosinophils Absolute 0 29 0 00 - 0 61 Thousand/µL    Basophils Absolute 0 03 0 00 - 0 10 Thousands/µL   Fingerstick Glucose (POCT)    Collection Time: 02/23/21  7:08 AM   Result Value Ref Range    POC Glucose 91 65 - 140 mg/dl   Fingerstick Glucose (POCT)    Collection Time: 02/23/21 11:12 AM   Result Value Ref Range    POC Glucose 172 (H) 65 - 140 mg/dl   Fingerstick Glucose (POCT)    Collection Time: 02/23/21  4:15 PM   Result Value Ref Range    POC Glucose 97 65 - 140 mg/dl   Fingerstick Glucose (POCT)    Collection Time: 02/23/21  8:23 PM   Result Value Ref Range    POC Glucose 137 65 - 140 mg/dl   CBC and differential    Collection Time: 02/24/21  6:22 AM   Result Value Ref Range    WBC 7 05 4 31 - 10 16 Thousand/uL    RBC 3 85 (L) 3 88 - 5 62 Million/uL    Hemoglobin 10 4 (L) 12 0 - 17 0 g/dL    Hematocrit 33 9 (L) 36 5 - 49 3 %    MCV 88 82 - 98 fL    MCH 27 0 26 8 - 34 3 pg    MCHC 30 7 (L) 31 4 - 37 4 g/dL    RDW 13 2 11 6 - 15 1 %    MPV 9 4 8 9 - 12 7 fL    Platelets 533 382 - 671 Thousands/uL    nRBC 0 /100 WBCs    Neutrophils Relative 63 43 - 75 %    Immat GRANS % 0 0 - 2 %    Lymphocytes Relative 24 14 - 44 %    Monocytes Relative 9 4 - 12 %    Eosinophils Relative 3 0 - 6 %    Basophils Relative 1 0 - 1 %    Neutrophils Absolute 4 36 1 85 - 7 62 Thousands/µL    Immature Grans Absolute 0 03 0 00 - 0 20 Thousand/uL    Lymphocytes Absolute 1 72 0 60 - 4 47 Thousands/µL    Monocytes Absolute 0 66 0 17 - 1 22 Thousand/µL    Eosinophils Absolute 0 24 0 00 - 0 61 Thousand/µL    Basophils Absolute 0 04 0 00 - 0 10 Thousands/µL   Phosphorus    Collection Time: 02/24/21  6:22 AM   Result Value Ref Range    Phosphorus 3 3 2 7 - 4 5 mg/dL   Magnesium    Collection Time: 02/24/21  6:22 AM   Result Value Ref Range    Magnesium 1 7 1 6 - 2 6 mg/dL   Comprehensive metabolic panel    Collection Time: 02/24/21  6:22 AM   Result Value Ref Range    Sodium 136 136 - 145 mmol/L    Potassium 4 0 3 5 - 5 3 mmol/L    Chloride 102 100 - 108 mmol/L    CO2 24 21 - 32 mmol/L    ANION GAP 10 4 - 13 mmol/L    BUN 16 5 - 25 mg/dL    Creatinine 1 05 0 60 - 1 30 mg/dL    Glucose 125 65 - 140 mg/dL    Calcium 8 6 8 3 - 10 1 mg/dL    Corrected Calcium 10 2 (H) 8 3 - 10 1 mg/dL    AST 19 5 - 45 U/L    ALT 21 12 - 78 U/L    Alkaline Phosphatase 96 46 - 116 U/L    Total Protein 7 8 6 4 - 8 2 g/dL    Albumin 2 0 (L) 3 5 - 5 0 g/dL    Total Bilirubin 0 20 0 20 - 1 00 mg/dL    eGFR 89 ml/min/1 73sq m   Fingerstick Glucose (POCT)    Collection Time: 02/24/21  7:28 AM   Result Value Ref Range    POC Glucose 126 65 - 140 mg/dl   Fingerstick Glucose (POCT)    Collection Time: 02/24/21 11:12 AM   Result Value Ref Range    POC Glucose 183 (H) 65 - 140 mg/dl             Dr Kishore Qureshi MD, [facility]      "This note has been constructed using a voice recognition system  Therefore there may be syntax, spelling, and/or grammatical errors  Please call if you have any questions  "       ADDENDUM:   3/31/ 2021    Discussed with patient's surgeon Dr Sterling Lyon regarding his groin surgery  Patient has a large mass and hidradenitis in his left groin  He has been keen on having it excised  As per Dr Roro Nayak, this can be accomplished by stopping aspirin for at least 5 days and continuing Brilinta  perioperatively without discontinuation  Patient is s/p PCI/SABINE of RCA on 12/01/2020, therefore the surgery will be at least 3 months post PCI  Ideally a 6 month window is more preferable  According to Dr Roro Nayak, the patient is aware that there is a risk of stent thrombosis and he is willing to take that risk in order to have the groin surgery  As per my discussion with Dr Sterling Lyon, as long as the patient understands and accepts the risk, they can proceed with the surgery      Dr Kishore Qureshi MD, [facility]

## 2021-03-15 NOTE — TELEPHONE ENCOUNTER
----- Message from Renetta Mayen MD sent at 3/15/2021 10:53 AM EDT -----  Please call and inform patient that his blood test showed that his liver enzymes are normal     His bad cholesterol, LDL is elevated at 86, target is less than 70  Recommend increase atorvastatin to 80 mg daily and   repeat lipid profile and Liver Enzymes/Hepatic Panel in 3 months

## 2021-03-22 DIAGNOSIS — I25.10 CAD (CORONARY ARTERY DISEASE): ICD-10-CM

## 2021-03-22 NOTE — TELEPHONE ENCOUNTER
Patient called needing refill of ticagrelor (BRILINTA) 90 MG  Patient is asking for a 90 day supply- as insurance will only cover 90 day supply at this time   Confirmed to send to [facility]

## 2021-03-31 ENCOUNTER — TELEPHONE (OUTPATIENT)
Dept: CARDIOLOGY CLINIC | Facility: CLINIC | Age: 41
End: 2021-03-31

## 2021-03-31 ENCOUNTER — OFFICE VISIT (OUTPATIENT)
Dept: SURGERY | Facility: CLINIC | Age: 41
End: 2021-03-31
Payer: COMMERCIAL

## 2021-03-31 ENCOUNTER — TELEPHONE (OUTPATIENT)
Dept: SURGERY | Facility: CLINIC | Age: 41
End: 2021-03-31

## 2021-03-31 VITALS — BODY MASS INDEX: 50.62 KG/M2 | WEIGHT: 315 LBS | TEMPERATURE: 97.2 F | HEIGHT: 66 IN

## 2021-03-31 DIAGNOSIS — L73.2 HIDRADENITIS SUPPURATIVA: Primary | ICD-10-CM

## 2021-03-31 PROCEDURE — 99215 OFFICE O/P EST HI 40 MIN: CPT | Performed by: SURGERY

## 2021-03-31 PROCEDURE — 1036F TOBACCO NON-USER: CPT | Performed by: SURGERY

## 2021-03-31 RX ORDER — CLINDAMYCIN HYDROCHLORIDE 150 MG/1
150 CAPSULE ORAL EVERY 12 HOURS SCHEDULED
Qty: 12 CAPSULE | Refills: 0 | Status: SHIPPED | OUTPATIENT
Start: 2021-03-31 | End: 2021-04-29 | Stop reason: HOSPADM

## 2021-03-31 NOTE — TELEPHONE ENCOUNTER
Call to wound care to find out how to order a wound vac for an out patient surgery  And how we get it ordered and to the OR  Requested call back

## 2021-03-31 NOTE — PROGRESS NOTES
Office Visit - General Surgery  Luli Foster MRN: [id number]  Encounter: [id number]    Assessment and Plan  Left lower extremity soft tissue lesion and hidradenitis -- will plan on excision of mass and wide excision of hidradenitis  Now with cellulitis  -- start outpt abx  Given enlarging mass and worsening episodes of hidradenitis, will plan on excision next week  Will place vac at conclusion of procedure and obtain plastics consult  He understands the risks of the procedure including possible cardiac issues given MI last year      Problem List Items Addressed This Visit     None          Chief Complaint:  Luli Foster is a 45 y o  male who presents for Follow-up (3rd f/u b/l groin wounds and pilonidal cyst all draining )    Subjective  Increasing pain from left posterior thigh    Past Medical History  Past Medical History:   Diagnosis Date    Coronary artery disease     Diabetes mellitus ([facility] [address] [facility]     Heart disease     CAD   s/p ptca with 1 stent 2012    Hidradenitis suppurativa     groin    Hyperlipidemia     Hypertension     MI (myocardial infarction) ([facility] [address] )     " silent " M I  in the past    Morbid obesity with BMI of 50 0-59 9, adult ([address] )     Myocardial infarction ([address] )     Nicotine dependence     Obesity     Pilonidal cyst        Past Surgical History  Past Surgical History:   Procedure Laterality Date    CORONARY ANGIOPLASTY WITH STENT PLACEMENT      INCISION AND DRAINAGE OF WOUND N/A 1/24/2017    Procedure: INCISION AND DRAINAGE (I&D) BUTTOCK, PILONIDAL CYST;  Surgeon: Marjorie Cunningham MD;  Location: [facility];  Service:    [facility] LEG SURGERY Left     I&D of left leg 2012       Family History  Family History   Problem Relation Age of Onset    Heart disease Father        Social History  Social History     Socioeconomic History    Marital status: Single     Spouse name: None    Number of children: None    Years of education: None    Highest education level: None   Occupational History    None   Social Needs    Financial resource strain: None    Food insecurity     Worry: None     Inability: None    Transportation needs     Medical: None     Non-medical: None   Tobacco Use    Smoking status: Current Every Day Smoker     Packs/day: 1 00     Years: 20 00     Pack years: 20 00     Types: Cigarettes    Smokeless tobacco: Never Used   Substance and Sexual Activity    Alcohol use: Not Currently     Comment: rarely    Drug use: No    Sexual activity: None   Lifestyle    Physical activity     Days per week: None     Minutes per session: None    Stress: None   Relationships    Social connections     Talks on phone: None     Gets together: None     Attends Episcopalian service: None     Active member of club or organization: None     Attends meetings of clubs or organizations: None     Relationship status: None    Intimate partner violence     Fear of current or ex partner: None     Emotionally abused: None     Physically abused: None     Forced sexual activity: None   Other Topics Concern    None   Social History Narrative    None        Medications  Current Outpatient Medications on File Prior to Visit   Medication Sig Dispense Refill    amLODIPine (NORVASC) 10 mg tablet take 1 tablet by mouth once daily 90 tablet 0    aspirin (ECOTRIN LOW STRENGTH) 81 mg EC tablet Take 1 tablet (81 mg total) by mouth daily 30 tablet 0    atorvastatin (LIPITOR) 80 mg tablet Take 1 tablet (80 mg total) by mouth daily 90 tablet 2    carvedilol (COREG) 6 25 mg tablet Take 1 tablet (6 25 mg total) by mouth 2 (two) times a day with meals 120 tablet 2    glimepiride (AMARYL) 4 mg tablet Take 1 tablet (4 mg total) by mouth daily with breakfast 90 tablet 1    lisinopril (ZESTRIL) 10 mg tablet Take 1 tablet (10 mg total) by mouth daily 90 tablet 1    metFORMIN (GLUCOPHAGE) 1000 MG tablet Take 1 tablet (1,000 mg total) by mouth 2 (two) times a day with meals 180 tablet 1    nicotine (NICODERM CQ) 21 mg/24 hr TD 24 hr patch Place 1 patch on the skin every 24 hours 28 patch 0    Omega-3 Fatty Acids (fish oil) 1,000 mg Take 1 capsule (1,000 mg total) by mouth 2 (two) times a day 180 capsule 2    ticagrelor (BRILINTA) 90 MG Take 1 tablet (90 mg total) by mouth every 12 (twelve) hours 180 tablet 0     No current facility-administered medications on file prior to visit          Allergies  Allergies   Allergen Reactions    Amoxicillin        Review of Systems    Objective  Vitals:    03/31/21 1049   Temp: (!) 97 2 °F (36 2 °C)       Physical Exam     Lungs -- CTA  Heart -- RRR s murmurs  Extremities -- L thigh with soft tissue mass -- mobile, erythematous; has hidradenitis on posterior thigh with cellulitis

## 2021-03-31 NOTE — H&P (VIEW-ONLY)
Office Visit - General Surgery  Army Maki MRN: [id number]  Encounter: [id number]    Assessment and Plan  Left lower extremity soft tissue lesion and hidradenitis -- will plan on excision of mass and wide excision of hidradenitis  Now with cellulitis  -- start outpt abx  Given enlarging mass and worsening episodes of hidradenitis, will plan on excision next week  Will place vac at conclusion of procedure and obtain plastics consult  He understands the risks of the procedure including possible cardiac issues given MI last year      Problem List Items Addressed This Visit     None          Chief Complaint:  Army Maki is a 45 y o  male who presents for Follow-up (3rd f/u b/l groin wounds and pilonidal cyst all draining )    Subjective  Increasing pain from left posterior thigh    Past Medical History  Past Medical History:   Diagnosis Date    Coronary artery disease     Diabetes mellitus [facility] [address] [facility]     Heart disease     CAD   s/p ptca with 1 stent 2012    Hidradenitis suppurativa     groin    Hyperlipidemia     Hypertension     MI (myocardial infarction) ([facility] [address] )     " silent " M I  in the past    Morbid obesity with BMI of 50 0-59 9, adult ([facility] [address] )     Myocardial infarction ([facility] [address] )     Nicotine dependence     Obesity     Pilonidal cyst        Past Surgical History  Past Surgical History:   Procedure Laterality Date    CORONARY ANGIOPLASTY WITH STENT PLACEMENT      INCISION AND DRAINAGE OF WOUND N/A 1/24/2017    Procedure: INCISION AND DRAINAGE (I&D) BUTTOCK, PILONIDAL CYST;  Surgeon: Caty Chan MD;  Location: [facility];  Service:    [facility] LEG SURGERY Left     I&D of left leg 2012       Family History  Family History   Problem Relation Age of Onset    Heart disease Father        Social History  Social History     Socioeconomic History    Marital status: Single     Spouse name: None    Number of children: None    Years of education: None    Highest education level: None   Occupational History    None   Social Needs    Financial resource strain: None    Food insecurity     Worry: None     Inability: None    Transportation needs     Medical: None     Non-medical: None   Tobacco Use    Smoking status: Current Every Day Smoker     Packs/day: 1 00     Years: 20 00     Pack years: 20 00     Types: Cigarettes    Smokeless tobacco: Never Used   Substance and Sexual Activity    Alcohol use: Not Currently     Comment: rarely    Drug use: No    Sexual activity: None   Lifestyle    Physical activity     Days per week: None     Minutes per session: None    Stress: None   Relationships    Social connections     Talks on phone: None     Gets together: None     Attends Jain service: None     Active member of club or organization: None     Attends meetings of clubs or organizations: None     Relationship status: None    Intimate partner violence     Fear of current or ex partner: None     Emotionally abused: None     Physically abused: None     Forced sexual activity: None   Other Topics Concern    None   Social History Narrative    None        Medications  Current Outpatient Medications on File Prior to Visit   Medication Sig Dispense Refill    amLODIPine (NORVASC) 10 mg tablet take 1 tablet by mouth once daily 90 tablet 0    aspirin (ECOTRIN LOW STRENGTH) 81 mg EC tablet Take 1 tablet (81 mg total) by mouth daily 30 tablet 0    atorvastatin (LIPITOR) 80 mg tablet Take 1 tablet (80 mg total) by mouth daily 90 tablet 2    carvedilol (COREG) 6 25 mg tablet Take 1 tablet (6 25 mg total) by mouth 2 (two) times a day with meals 120 tablet 2    glimepiride (AMARYL) 4 mg tablet Take 1 tablet (4 mg total) by mouth daily with breakfast 90 tablet 1    lisinopril (ZESTRIL) 10 mg tablet Take 1 tablet (10 mg total) by mouth daily 90 tablet 1    metFORMIN (GLUCOPHAGE) 1000 MG tablet Take 1 tablet (1,000 mg total) by mouth 2 (two) times a day with meals 180 tablet 1    nicotine (NICODERM CQ) 21 mg/24 hr TD 24 hr patch Place 1 patch on the skin every 24 hours 28 patch 0    Omega-3 Fatty Acids (fish oil) 1,000 mg Take 1 capsule (1,000 mg total) by mouth 2 (two) times a day 180 capsule 2    ticagrelor (BRILINTA) 90 MG Take 1 tablet (90 mg total) by mouth every 12 (twelve) hours 180 tablet 0     No current facility-administered medications on file prior to visit          Allergies  Allergies   Allergen Reactions    Amoxicillin        Review of Systems    Objective  Vitals:    03/31/21 1049   Temp: (!) 97 2 °F (36 2 °C)       Physical Exam     Lungs -- CTA  Heart -- RRR s murmurs  Extremities -- L thigh with soft tissue mass -- mobile, erythematous; has hidradenitis on posterior thigh with cellulitis

## 2021-04-01 ENCOUNTER — CLINICAL SUPPORT (OUTPATIENT)
Dept: BARIATRICS | Facility: CLINIC | Age: 41
End: 2021-04-01

## 2021-04-01 ENCOUNTER — ANESTHESIA EVENT (OUTPATIENT)
Dept: PERIOP | Facility: HOSPITAL | Age: 41
DRG: 570 | End: 2021-04-01
Payer: COMMERCIAL

## 2021-04-01 VITALS
WEIGHT: 315 LBS | HEART RATE: 78 BPM | BODY MASS INDEX: 49.44 KG/M2 | TEMPERATURE: 94.1 F | DIASTOLIC BLOOD PRESSURE: 80 MMHG | HEIGHT: 67 IN | SYSTOLIC BLOOD PRESSURE: 128 MMHG

## 2021-04-01 DIAGNOSIS — Z98.84 BARIATRIC SURGERY STATUS: ICD-10-CM

## 2021-04-01 DIAGNOSIS — Z11.59 SPECIAL SCREENING EXAMINATION FOR UNSPECIFIED VIRAL DISEASE: Primary | ICD-10-CM

## 2021-04-01 DIAGNOSIS — E66.01 MORBID OBESITY (HCC): Primary | ICD-10-CM

## 2021-04-01 DIAGNOSIS — E66.01 MORBID (SEVERE) OBESITY DUE TO EXCESS CALORIES (HCC): ICD-10-CM

## 2021-04-01 LAB — HBA1C MFR BLD: 7.7 % (ref 4.8–5.6)

## 2021-04-01 PROCEDURE — 3051F HG A1C>EQUAL 7.0%<8.0%: CPT | Performed by: SURGERY

## 2021-04-01 PROCEDURE — 3008F BODY MASS INDEX DOCD: CPT | Performed by: SURGERY

## 2021-04-01 PROCEDURE — RECHECK

## 2021-04-01 NOTE — PROGRESS NOTES
Bariatric Nutrition Assessment Note  Type of surgery    Preop (3 months of wt checks)  Surgery Date: TBD--leaning toward RYGB  Surgeon: Dr Radha Agarwal  45 y o   male     Wt with BMI of 25: 157 4lbs  Pre-Op Excess Wt: 213 4lbs  Blood pressure 128/80, pulse 78, temperature (!) 94 1 °F (34 5 °C), height 5' 6 7" (1 694 m), weight (!) 168 kg (370 lb 12 8 oz)  Body mass index is 58 6 kg/m²  Weight History   Onset of Obesity: Childhood, but in his 19's gained the most weight and then since heart surgery about 10 years ago feels the meds caused some weight gain  Family history of obesity: Yes  Wt Loss Attempts: Exercise  High Protein/Low CHO diets (Atkins, Union, etc )  Nutrition Counseling with RD  Self Created Diets (Portion Control, Healthy Food Choices, etc )  Maximum Wt Lost: -60lbs --diet and exercise    Review of History and Medications   Recent labs:    · A1c completed 3/31/21--acceptable for sx  · lipids completed 3/9/21 and acceptable for sx  · CMP and CBC 2/24/21, but was while in hospital therefore will repeat for sx  Also ordered TSH to be checked  DM x 5 years  First MI before 28years old  Just started using the nicotine patch and no more cigarettes since started the beginning of the week  Pt aware he needs to be completely nicotine free, even patches, for one month before nicotine test and remain nicotine free for sx     Past Medical History:   Diagnosis Date    Coronary artery disease     Diabetes mellitus ([facility] [address] [facility]     Heart disease     CAD   s/p ptca with 1 stent 2012    Hidradenitis suppurativa     groin    Hyperlipidemia     Hypertension     MI (myocardial infarction) ([facility] [address] )     " silent " M I  in the past    Morbid obesity with BMI of 50 0-59 9, adult ([facility] [address] )     Myocardial infarction ([facility] [address] )     Nicotine dependence     Obesity     Pilonidal cyst      Past Surgical History:   Procedure Laterality Date    CORONARY ANGIOPLASTY WITH STENT PLACEMENT      INCISION AND DRAINAGE OF WOUND N/A 1/24/2017    Procedure: INCISION AND DRAINAGE (I&D) BUTTOCK, PILONIDAL CYST;  Surgeon: Meggan Brock MD;  Location: [facility];  Service:    [place] [facility] LEG SURGERY Left     I&D of left leg 2012     Social History     Socioeconomic History    Marital status: Single     Spouse name: None    Number of children: None    Years of education: None    Highest education level: None   Occupational History    None   Social Needs    Financial resource strain: None    Food insecurity     Worry: None     Inability: None    Transportation needs     Medical: None     Non-medical: None   Tobacco Use    Smoking status: Former Smoker     Packs/day: 1 00     Years: 20 00     Pack years: 20 00     Types: Cigarettes    Smokeless tobacco: Never Used   Substance and Sexual Activity    Alcohol use: Not Currently     Comment: rarely    Drug use: No    Sexual activity: None   Lifestyle    Physical activity     Days per week: None     Minutes per session: None    Stress: None   Relationships    Social connections     Talks on phone: None     Gets together: None     Attends Taoist service: None     Active member of club or organization: None     Attends meetings of clubs or organizations: None     Relationship status: None    Intimate partner violence     Fear of current or ex partner: None     Emotionally abused: None     Physically abused: None     Forced sexual activity: None   Other Topics Concern    None   Social History Narrative    None       Current Outpatient Medications:     amLODIPine (NORVASC) 10 mg tablet, take 1 tablet by mouth once daily, Disp: 90 tablet, Rfl: 0    aspirin (ECOTRIN LOW STRENGTH) 81 mg EC tablet, Take 1 tablet (81 mg total) by mouth daily, Disp: 30 tablet, Rfl: 0    atorvastatin (LIPITOR) 80 mg tablet, Take 1 tablet (80 mg total) by mouth daily, Disp: 90 tablet, Rfl: 2    carvedilol (COREG) 6 25 mg tablet, Take 1 tablet (6 25 mg total) by mouth 2 (two) times a day with meals, Disp: 120 tablet, Rfl: 2    clindamycin (CLEOCIN) 150 mg capsule, Take 1 capsule (150 mg total) by mouth every 12 (twelve) hours for 6 days, Disp: 12 capsule, Rfl: 0    glimepiride (AMARYL) 4 mg tablet, Take 1 tablet (4 mg total) by mouth daily with breakfast, Disp: 90 tablet, Rfl: 1    lisinopril (ZESTRIL) 10 mg tablet, Take 1 tablet (10 mg total) by mouth daily, Disp: 90 tablet, Rfl: 1    metFORMIN (GLUCOPHAGE) 1000 MG tablet, Take 1 tablet (1,000 mg total) by mouth 2 (two) times a day with meals, Disp: 180 tablet, Rfl: 1    nicotine (NICODERM CQ) 21 mg/24 hr TD 24 hr patch, Place 1 patch on the skin every 24 hours, Disp: 28 patch, Rfl: 0    Omega-3 Fatty Acids (fish oil) 1,000 mg, Take 1 capsule (1,000 mg total) by mouth 2 (two) times a day, Disp: 180 capsule, Rfl: 2    ticagrelor (BRILINTA) 90 MG, Take 1 tablet (90 mg total) by mouth every 12 (twelve) hours, Disp: 180 tablet, Rfl: 0  Food Intake and Lifestyle Assessment   Food Intake Assessment completed via usual diet recall  Works as a   Breakfast: 6-9am:  Seth bar or nutribar or oatmeal (2 packs) or breakfast hot pocket and 20oz coffee from [employer] with cream and sugar  Snack: 9am-another snack bar   Lunch: 12pm-used to do a low carb wrap with lunch meat  More lately has doing the [employer] frozen meal  Snack: mcmahan cottage with fruit (pineapple or blueberries)  Dinner: 5-7pm:  eats out once a week, cooks most of the time    If cooks--beef stir ricci:  Beef, pepper, onions, over protein pasta OR Ground chicken tacos--2-3 of the larger carb count wrap and 1/2 lb of the ground chicken (makes 1lb and splits with son) OR [place] chicken sliders with BBQ sauce and gouda (1-2 on Hawaiian roll)  Snack: occasional--once every 2 weeks will have a bowl of ice cream or SF Jell-o cup or cottage cheese  Beverage intake: water, sugar free beverages, diet soda and coffee/tea  Protein supplement: none at this time  Estimated protein intake per day:  cals  Estimated fluid intake per day: 64-120oz water, 1 can diet sunkist soda per day, Hasn't had alcohol in the past year due to cardiologist recommendations  Meals eaten away from home: 1 meal per week--pizza Or Felice Hieu (fried fish tacos--corn tortilla)  Typical meal pattern: 2-3 meals per day and 2 snacks per day  Eating Behaviors: Consumption of high calorie/ high fat foods, Consumption of high calorie beverages, Large portion sizes, Mindless eating and Emotional eating  Food allergies or intolerances: Allergies   Allergen Reactions    Amoxicillin      Cultural or Latter-day considerations: none    Physical Assessment  Physical Activity  Types of exercise: None, has physical job  Current physical limitations: Has a large fatty mass tumor in groin/leg that will be removed next week  Psychosocial Assessment   Support systems: friend(s) relative(s) children  Socioeconomic factors: none  Lives with self, 18yr old son and roommate  Pt does most of the cooking    Nutrition Diagnosis  Diagnosis: Overweight / Obesity (NC-3 3)  Related to: Physical inactivity and Excessive energy intake  As Evidenced by: BMI >25     Nutrition Prescription: Recommend the following diet  Regular    Interventions and Teaching   Discussed pre-op and post-op nutrition guidelines  Patient educated and handouts provided    Surgical changes to stomach / GI  Capacity of post-surgery stomach  Diet progression  Adequate hydration  Sugar and fat restriction to decrease "dumping syndrome"  Fat restriction to decrease steatorrhea  Expected weight loss  Weight loss plateaus/ possibility of weight regain  Exercise  Suggestions for pre-op diet  Nutrition considerations after surgery  Protein supplements  Meal planning and preparation  Appropriate carbohydrate, protein, and fat intake, and food/fluid choices to maximize safe weight loss, nutrient intake, and tolerance   Dietary and lifestyle changes  Possible problems with poor eating habits  Intuitive eating  Techniques for self monitoring and keeping daily food journal  Potential for food intolerance after surgery, and ways to deal with them including: lactose intolerance, nausea, reflux, vomiting, diarrhea, food intolerance, appetite changes, gas  Vitamin / Mineral supplementation of Multivitamin with minerals and Vitamin D pre-op    Education provided to: patient    Barriers to learning: No barriers identified  Readiness to change: preparation    Prior research on procedure: books, internet, discussed with provider and friends or family    Comprehension: verbalizes understanding     Expected Compliance: good  Recommendations  Pt is an appropriate candidate for surgery  Yes  Evaluation / Monitoring  Dietitian to Monitor: Eating pattern as discussed Tolerance of nutrition prescription Body weight Lab values Physical activity  Pre-op wt loss goals:  Do Not Gain weight  10% by day of surgery:  -36lbs (331lbs)  5% when ready to submit:  -18lbs (349lbs)  Goals  Eliminate sugar sweetened beverages and diet soda  Decrease caffeine  Food journal via [employer]  Exercise 30 minutes 5 times per week--walking/biking/boxing  Complete lesson plans 1-6  Eat 3 meals per day  Add protein to breakfast ie: use protein shake or have 1 pack of oatmeal + 2 eggs for more protein and less carbs    Decrease portions  Time Spent:   1 Hour

## 2021-04-02 ENCOUNTER — TELEPHONE (OUTPATIENT)
Dept: CARDIOLOGY CLINIC | Facility: CLINIC | Age: 41
End: 2021-04-02

## 2021-04-02 ENCOUNTER — TELEPHONE (OUTPATIENT)
Dept: BARIATRICS | Facility: CLINIC | Age: 41
End: 2021-04-02

## 2021-04-02 NOTE — TELEPHONE ENCOUNTER
Pt just saw Dr Berlin Peñaloza on 3/15/21 and is going to be scheduled for gastric bypass surgery in June and will need cardiac clearance  Will Dr Berlin Peñaloza be able to clear him from his last appt or does he need another one  Please advise

## 2021-04-03 NOTE — PRE-PROCEDURE INSTRUCTIONS
Pre-Surgery Instructions:   Medication Instructions    amLODIPine (NORVASC) 10 mg tablet Pt NOT taking DOS    aspirin (ECOTRIN LOW STRENGTH) 81 mg EC tablet Pt LD was 4/2 holding until after DOS    atorvastatin (LIPITOR) 80 mg tablet Pt NOT taking DOS    carvedilol (COREG) 6 25 mg tablet Pt NOT taking DOS    clindamycin (CLEOCIN) 150 mg capsule Pt NOT taking DOS    glimepiride (AMARYL) 4 mg tablet Pt NOT taking DOS    lisinopril (ZESTRIL) 10 mg tablet Pt NOT taking DOS    metFORMIN (GLUCOPHAGE) 1000 MG tablet Pt NOT taking DOS    Omega-3 Fatty Acids (fish oil) 1,000 mg Pt LD was 4/2 holding until after DOS    Ticagrelor (BRILINTA PO) Pt NOT taking DOS    ACE/ARB Med Class  Continue this medication up to the evening before surgery/procedure, but do not take the morning of the day of surgery  ASA Med Class: Aspirin  Should be discontinued at least one week prior to planned operation, unless specifically stated otherwise by surgical service  Your Surgeon may have patient stop taking aspirin up to a week before surgery if having intracranial, middle ear, posterior eye, spine surgery or prostate surgery  [Patients taking aspirin for coronary stents should be reviewed by an anesthesiologist in the optimization clinic  Please do not discontinue aspirin in patients with coronary stents unless given specific permission to do so by the cardiologist who prescribed medication ]   If your surgeon approves please continue to take this medication on your normal schedule  You may take this medication on the morning of your surgery with a sip of water  Beta blocker Med Class  Continue to take this heart medication on your normal schedule  If this is an oral medication and you take it in the morning, then you may take this medicine with a sip of water  Calcium Channel Blocker Med Class  Continue to take this heart medication on your normal schedule    If this is an oral medication and you take it in the morning, then you may take this medicine with a sip of water  Insulin Med Class  Pre-Surgery/Procedure Instructions for Adult Patients who Take Medicine for Diabetes or to Control their Blood Sugar     Day Before Surgery/Procedure  Use the directions based on the type of medicine you take for your diabetes  1  If you are having a procedure that does not require a bowel prep:  ? Pre-Mixed Insulin (Intermediate Acting: Humalog 75/25, Humulin 70/30  Novolog 70/30, Regular Insulin)  § Take ½ your regular dose the evening before your procedure  ? Rapid/Fast Acting Insulin/Long Acting Insulin (Humalog U200, NovoLog, Apidra, Lantus, Levemir, [place], [place])  § Take your FULL regular dose the day before procedure  ? Oral Diabetic Medicines including Glipizide/Glimepiride/Glucotrol (sulfonylurea)  § Take your regular dose with dinner the evening before your procedure  2  If you are having a procedure (e g  Colonoscopy) that requires a bowel prep and you are allowed to have at least a clear liquid diet:  ? Pre-Mixed Insulin (Intermediate Acting: Humalog 75/25, Humulin 70/30, Novolog 70/30, Regular Insulin)  § Take ½ your regular dose the evening before your procedure  ? Rapid/Fast Acting Insulin (Humalog U200, NovoLog, Apidra, Fiasp)  § Take ½ your regular dose the evening before your procedure  ? Long Acting Insulin (Lantus, Levemir, [place])  § Take your FULL regular dose the day before procedure  ? Oral Glipizide/Glimepiride/Glucotrol (sulfonylurea)  § Take ½ your regular dose the evening before your procedure  ? Oral Diabetic Medicines that are NOT Glipizide/Glimepiride/Glucotrol  § Take your regular dose with dinner in the evening before your procedure      Day of Surgery/Procedure  · Long Acting Insulin (Lantus, Levemir, [place])  ? If you usually take your Long-Acting Insulin in the morning, take the full dose as scheduled    · With the exception of the morning Long-Acting Insulin noted above, DO NOT take ANY diabetic medicine on the day of your procedure unless you were instructed by the doctor who manages your diabetic medicines  · Continue to check your blood sugars  · If you have an insulin pump then consult with your Endocrinologist for instructions  · If you cannot see your Endocrinologist, on the day of the procedure set your insulin pump to your basal rate only  Please bring your insulin pump supplies to the hospital      This Educational material has been approved by the Patient Education Advisory Committee  Date prepared: 1/17/2018          Expiration date: 1/17/2019        Approval Number:        Platelet Aggregation Inhibitor Ticagrelor (Brillinta) Med Class  Stop taking this medication 5-7 days prior to surgery/procedure with prescribing Physician and Surgeon consultation  Statin Med Class  Continue to take this medication on your normal schedule  If this is an oral medication and you take it in the morning, then you may take this medicine with a sip of water  Reviewed with Pt medication insts  Pt LD of ASA was 4/2, Pt will continue to take Brilinta up to the DOS per CC  Pt would prefer not to take any meds DOS  Reviewed showering insts with pt advised NPO at MN 4/5 for 4/6 DOS advised Pt to have a snack before bed with being diabetic  Advised of [facility] call 4/5 from [phone] with final DOS details    Pt verbalized understanding to all of the above

## 2021-04-05 NOTE — TELEPHONE ENCOUNTER
Per Dr Chele Bacon, pt notified that he will need a new apt  S/W pt and he will call when he has the surgery date and schedule a clearance apt

## 2021-04-06 ENCOUNTER — APPOINTMENT (OUTPATIENT)
Dept: RADIOLOGY | Facility: HOSPITAL | Age: 41
DRG: 570 | End: 2021-04-06
Payer: COMMERCIAL

## 2021-04-06 ENCOUNTER — ANESTHESIA (OUTPATIENT)
Dept: PERIOP | Facility: HOSPITAL | Age: 41
DRG: 570 | End: 2021-04-06
Payer: COMMERCIAL

## 2021-04-06 ENCOUNTER — HOSPITAL ENCOUNTER (INPATIENT)
Facility: HOSPITAL | Age: 41
LOS: 23 days | Discharge: HOME HEALTH | DRG: 570 | End: 2021-04-29
Attending: SURGERY | Admitting: SURGERY
Payer: COMMERCIAL

## 2021-04-06 ENCOUNTER — APPOINTMENT (INPATIENT)
Dept: NON INVASIVE DIAGNOSTICS | Facility: HOSPITAL | Age: 41
DRG: 570 | End: 2021-04-06
Payer: COMMERCIAL

## 2021-04-06 ENCOUNTER — APPOINTMENT (INPATIENT)
Dept: RADIOLOGY | Facility: HOSPITAL | Age: 41
DRG: 570 | End: 2021-04-06
Payer: COMMERCIAL

## 2021-04-06 DIAGNOSIS — I25.10 CAD (CORONARY ARTERY DISEASE): Primary | ICD-10-CM

## 2021-04-06 DIAGNOSIS — L02.214 CUTANEOUS ABSCESS OF GROIN: ICD-10-CM

## 2021-04-06 DIAGNOSIS — L98.9 SKIN LESION OF LEFT LEG: ICD-10-CM

## 2021-04-06 DIAGNOSIS — R19.09 LEFT GROIN MASS: ICD-10-CM

## 2021-04-06 LAB
ABO GROUP BLD: NORMAL
ABO GROUP BLD: NORMAL
ANION GAP SERPL CALCULATED.3IONS-SCNC: 6 MMOL/L (ref 4–13)
ANION GAP SERPL CALCULATED.3IONS-SCNC: 7 MMOL/L (ref 4–13)
ARTERIAL PATENCY WRIST A: YES
BASE EX.OXY STD BLDV CALC-SCNC: 83.3 % (ref 60–80)
BASE EXCESS BLDA CALC-SCNC: -1.3 MMOL/L
BASE EXCESS BLDA CALC-SCNC: -2.4 MMOL/L
BASE EXCESS BLDV CALC-SCNC: -0.5 MMOL/L
BASOPHILS # BLD AUTO: 0.04 THOUSANDS/ΜL (ref 0–0.1)
BASOPHILS NFR BLD AUTO: 0 % (ref 0–1)
BLD GP AB SCN SERPL QL: NEGATIVE
BODY TEMPERATURE: 99.2 DEGREES FEHRENHEIT
BUN SERPL-MCNC: 25 MG/DL (ref 5–25)
BUN SERPL-MCNC: 30 MG/DL (ref 5–25)
CALCIUM SERPL-MCNC: 8 MG/DL (ref 8.3–10.1)
CALCIUM SERPL-MCNC: 8.8 MG/DL (ref 8.3–10.1)
CHLORIDE SERPL-SCNC: 103 MMOL/L (ref 100–108)
CHLORIDE SERPL-SCNC: 104 MMOL/L (ref 100–108)
CO2 SERPL-SCNC: 23 MMOL/L (ref 21–32)
CO2 SERPL-SCNC: 23 MMOL/L (ref 21–32)
CREAT SERPL-MCNC: 1.47 MG/DL (ref 0.6–1.3)
CREAT SERPL-MCNC: 1.82 MG/DL (ref 0.6–1.3)
EOSINOPHIL # BLD AUTO: 0.05 THOUSAND/ΜL (ref 0–0.61)
EOSINOPHIL NFR BLD AUTO: 0 % (ref 0–6)
ERYTHROCYTE [DISTWIDTH] IN BLOOD BY AUTOMATED COUNT: 13.7 % (ref 11.6–15.1)
ERYTHROCYTE [DISTWIDTH] IN BLOOD BY AUTOMATED COUNT: 13.8 % (ref 11.6–15.1)
GFR SERPL CREATININE-BSD FRML MDRD: 46 ML/MIN/1.73SQ M
GFR SERPL CREATININE-BSD FRML MDRD: 59 ML/MIN/1.73SQ M
GLUCOSE P FAST SERPL-MCNC: 243 MG/DL (ref 65–99)
GLUCOSE SERPL-MCNC: 133 MG/DL (ref 65–140)
GLUCOSE SERPL-MCNC: 170 MG/DL (ref 65–140)
GLUCOSE SERPL-MCNC: 199 MG/DL (ref 65–140)
GLUCOSE SERPL-MCNC: 225 MG/DL (ref 65–140)
GLUCOSE SERPL-MCNC: 235 MG/DL (ref 65–140)
GLUCOSE SERPL-MCNC: 238 MG/DL (ref 65–140)
GLUCOSE SERPL-MCNC: 243 MG/DL (ref 65–140)
HCO3 BLDA-SCNC: 24.4 MMOL/L (ref 22–28)
HCO3 BLDA-SCNC: 25 MMOL/L (ref 22–28)
HCO3 BLDV-SCNC: 26.6 MMOL/L (ref 24–30)
HCT VFR BLD AUTO: 33.8 % (ref 36.5–49.3)
HCT VFR BLD AUTO: 40.2 % (ref 36.5–49.3)
HGB BLD-MCNC: 10.8 G/DL (ref 12–17)
HGB BLD-MCNC: 12.5 G/DL (ref 12–17)
HOROWITZ INDEX BLDA+IHG-RTO: 70 MM[HG]
HOROWITZ INDEX BLDA+IHG-RTO: 80 MM[HG]
HOROWITZ INDEX BLDA+IHG-RTO: 90 MM[HG]
I-TIME: 0.65
IMM GRANULOCYTES # BLD AUTO: 0.09 THOUSAND/UL (ref 0–0.2)
IMM GRANULOCYTES NFR BLD AUTO: 1 % (ref 0–2)
LACTATE SERPL-SCNC: 1.5 MMOL/L (ref 0.5–2)
LACTATE SERPL-SCNC: 1.6 MMOL/L (ref 0.5–2)
LYMPHOCYTES # BLD AUTO: 1.3 THOUSANDS/ΜL (ref 0.6–4.47)
LYMPHOCYTES NFR BLD AUTO: 9 % (ref 14–44)
MCH RBC QN AUTO: 28.1 PG (ref 26.8–34.3)
MCH RBC QN AUTO: 28.2 PG (ref 26.8–34.3)
MCHC RBC AUTO-ENTMCNC: 31.1 G/DL (ref 31.4–37.4)
MCHC RBC AUTO-ENTMCNC: 32 G/DL (ref 31.4–37.4)
MCV RBC AUTO: 88 FL (ref 82–98)
MCV RBC AUTO: 90 FL (ref 82–98)
MONOCYTES # BLD AUTO: 0.35 THOUSAND/ΜL (ref 0.17–1.22)
MONOCYTES NFR BLD AUTO: 2 % (ref 4–12)
NEUTROPHILS # BLD AUTO: 13.18 THOUSANDS/ΜL (ref 1.85–7.62)
NEUTS SEG NFR BLD AUTO: 88 % (ref 43–75)
NRBC BLD AUTO-RTO: 0 /100 WBCS
NT-PROBNP SERPL-MCNC: 32 PG/ML
O2 CT BLDA-SCNC: 15.4 ML/DL (ref 16–23)
O2 CT BLDA-SCNC: 15.6 ML/DL (ref 16–23)
O2 CT BLDV-SCNC: 12.8 ML/DL
OXYHGB MFR BLDA: 82.4 % (ref 94–97)
OXYHGB MFR BLDA: 93.6 % (ref 94–97)
PCO2 BLD: 55.9 MM HG (ref 42–50)
PCO2 BLDA: 44.4 MM HG (ref 36–44)
PCO2 BLDA: 54 MM HG (ref 36–44)
PCO2 BLDV: 55.2 MM HG (ref 42–50)
PEEP RESPIRATORY: 12 CM[H2O]
PEEP RESPIRATORY: 16 CM[H2O]
PEEP RESPIRATORY: 16 CM[H2O]
PH BLD: 7.3 [PH] (ref 7.3–7.4)
PH BLDA: 7.28 [PH] (ref 7.35–7.45)
PH BLDA: 7.36 [PH] (ref 7.35–7.45)
PH BLDV: 7.3 [PH] (ref 7.3–7.4)
PLATELET # BLD AUTO: 388 THOUSANDS/UL (ref 149–390)
PLATELET # BLD AUTO: 392 THOUSANDS/UL (ref 149–390)
PMV BLD AUTO: 9 FL (ref 8.9–12.7)
PMV BLD AUTO: 9.5 FL (ref 8.9–12.7)
PO2 BLDA: 55.1 MM HG (ref 75–129)
PO2 BLDA: 76.4 MM HG (ref 75–129)
PO2 BLDV: 53.6 MM HG (ref 35–45)
PO2 VENOUS TEMP CORRECTED: 54.7 MM HG (ref 35–45)
POTASSIUM SERPL-SCNC: 5.9 MMOL/L (ref 3.5–5.3)
POTASSIUM SERPL-SCNC: 6.7 MMOL/L (ref 3.5–5.3)
PRESSURE CONTROL: 15
PRESSURE CONTROL: 15
RBC # BLD AUTO: 3.83 MILLION/UL (ref 3.88–5.62)
RBC # BLD AUTO: 4.45 MILLION/UL (ref 3.88–5.62)
RH BLD: POSITIVE
RH BLD: POSITIVE
SODIUM SERPL-SCNC: 132 MMOL/L (ref 136–145)
SODIUM SERPL-SCNC: 134 MMOL/L (ref 136–145)
SPECIMEN EXPIRATION DATE: NORMAL
SPECIMEN SOURCE: ABNORMAL
SPECIMEN SOURCE: ABNORMAL
TROPONIN I SERPL-MCNC: <0.02 NG/ML
VENT AC: 15
VENT- AC: AC
VT SETTING VENT: 500 ML
WBC # BLD AUTO: 15.01 THOUSAND/UL (ref 4.31–10.16)
WBC # BLD AUTO: 19.22 THOUSAND/UL (ref 4.31–10.16)

## 2021-04-06 PROCEDURE — 82805 BLOOD GASES W/O2 SATURATION: CPT | Performed by: STUDENT IN AN ORGANIZED HEALTH CARE EDUCATION/TRAINING PROGRAM

## 2021-04-06 PROCEDURE — 83605 ASSAY OF LACTIC ACID: CPT | Performed by: EMERGENCY MEDICINE

## 2021-04-06 PROCEDURE — 88307 TISSUE EXAM BY PATHOLOGIST: CPT | Performed by: PATHOLOGY

## 2021-04-06 PROCEDURE — 94640 AIRWAY INHALATION TREATMENT: CPT

## 2021-04-06 PROCEDURE — 86900 BLOOD TYPING SEROLOGIC ABO: CPT | Performed by: SURGERY

## 2021-04-06 PROCEDURE — 71275 CT ANGIOGRAPHY CHEST: CPT

## 2021-04-06 PROCEDURE — 86850 RBC ANTIBODY SCREEN: CPT | Performed by: SURGERY

## 2021-04-06 PROCEDURE — 82948 REAGENT STRIP/BLOOD GLUCOSE: CPT

## 2021-04-06 PROCEDURE — 93005 ELECTROCARDIOGRAM TRACING: CPT

## 2021-04-06 PROCEDURE — 71045 X-RAY EXAM CHEST 1 VIEW: CPT

## 2021-04-06 PROCEDURE — 97605 NEG PRS WND THER DME<=50SQCM: CPT | Performed by: SURGERY

## 2021-04-06 PROCEDURE — 80048 BASIC METABOLIC PNL TOTAL CA: CPT | Performed by: STUDENT IN AN ORGANIZED HEALTH CARE EDUCATION/TRAINING PROGRAM

## 2021-04-06 PROCEDURE — 86901 BLOOD TYPING SEROLOGIC RH(D): CPT | Performed by: SURGERY

## 2021-04-06 PROCEDURE — 94760 N-INVAS EAR/PLS OXIMETRY 1: CPT

## 2021-04-06 PROCEDURE — 0JBM0ZZ EXCISION OF LEFT UPPER LEG SUBCUTANEOUS TISSUE AND FASCIA, OPEN APPROACH: ICD-10-PCS | Performed by: SURGERY

## 2021-04-06 PROCEDURE — 36600 WITHDRAWAL OF ARTERIAL BLOOD: CPT

## 2021-04-06 PROCEDURE — 99291 CRITICAL CARE FIRST HOUR: CPT | Performed by: SURGERY

## 2021-04-06 PROCEDURE — 94002 VENT MGMT INPAT INIT DAY: CPT

## 2021-04-06 PROCEDURE — 82805 BLOOD GASES W/O2 SATURATION: CPT | Performed by: EMERGENCY MEDICINE

## 2021-04-06 PROCEDURE — 5A1935Z RESPIRATORY VENTILATION, LESS THAN 24 CONSECUTIVE HOURS: ICD-10-PCS | Performed by: SURGERY

## 2021-04-06 PROCEDURE — 83880 ASSAY OF NATRIURETIC PEPTIDE: CPT | Performed by: STUDENT IN AN ORGANIZED HEALTH CARE EDUCATION/TRAINING PROGRAM

## 2021-04-06 PROCEDURE — 94664 DEMO&/EVAL PT USE INHALER: CPT

## 2021-04-06 PROCEDURE — 83605 ASSAY OF LACTIC ACID: CPT | Performed by: SURGERY

## 2021-04-06 PROCEDURE — 86920 COMPATIBILITY TEST SPIN: CPT

## 2021-04-06 PROCEDURE — 93306 TTE W/DOPPLER COMPLETE: CPT

## 2021-04-06 PROCEDURE — 85027 COMPLETE CBC AUTOMATED: CPT | Performed by: STUDENT IN AN ORGANIZED HEALTH CARE EDUCATION/TRAINING PROGRAM

## 2021-04-06 PROCEDURE — 84484 ASSAY OF TROPONIN QUANT: CPT | Performed by: STUDENT IN AN ORGANIZED HEALTH CARE EDUCATION/TRAINING PROGRAM

## 2021-04-06 PROCEDURE — 80048 BASIC METABOLIC PNL TOTAL CA: CPT | Performed by: SURGERY

## 2021-04-06 PROCEDURE — 85025 COMPLETE CBC W/AUTO DIFF WBC: CPT | Performed by: SURGERY

## 2021-04-06 PROCEDURE — 27327 EXC THIGH/KNEE LES SC < 3 CM: CPT | Performed by: SURGERY

## 2021-04-06 RX ORDER — LEVALBUTEROL 1.25 MG/.5ML
1.25 SOLUTION, CONCENTRATE RESPIRATORY (INHALATION) EVERY 8 HOURS
Status: DISCONTINUED | OUTPATIENT
Start: 2021-04-06 | End: 2021-04-06

## 2021-04-06 RX ORDER — CLINDAMYCIN PHOSPHATE 900 MG/50ML
INJECTION INTRAVENOUS AS NEEDED
Status: DISCONTINUED | OUTPATIENT
Start: 2021-04-06 | End: 2021-04-06

## 2021-04-06 RX ORDER — SODIUM CHLORIDE, SODIUM GLUCONATE, SODIUM ACETATE, POTASSIUM CHLORIDE, MAGNESIUM CHLORIDE, SODIUM PHOSPHATE, DIBASIC, AND POTASSIUM PHOSPHATE .53; .5; .37; .037; .03; .012; .00082 G/100ML; G/100ML; G/100ML; G/100ML; G/100ML; G/100ML; G/100ML
1000 INJECTION, SOLUTION INTRAVENOUS ONCE
Status: DISCONTINUED | OUTPATIENT
Start: 2021-04-06 | End: 2021-04-06

## 2021-04-06 RX ORDER — FENTANYL CITRATE 50 UG/ML
INJECTION, SOLUTION INTRAMUSCULAR; INTRAVENOUS AS NEEDED
Status: DISCONTINUED | OUTPATIENT
Start: 2021-04-06 | End: 2021-04-06

## 2021-04-06 RX ORDER — MIDAZOLAM HYDROCHLORIDE 2 MG/2ML
INJECTION, SOLUTION INTRAMUSCULAR; INTRAVENOUS AS NEEDED
Status: DISCONTINUED | OUTPATIENT
Start: 2021-04-06 | End: 2021-04-06

## 2021-04-06 RX ORDER — FUROSEMIDE 10 MG/ML
20 INJECTION INTRAMUSCULAR; INTRAVENOUS ONCE
Status: COMPLETED | OUTPATIENT
Start: 2021-04-06 | End: 2021-04-06

## 2021-04-06 RX ORDER — FENTANYL CITRATE-0.9 % NACL/PF 10 MCG/ML
100 PLASTIC BAG, INJECTION (ML) INTRAVENOUS CONTINUOUS
Status: DISCONTINUED | OUTPATIENT
Start: 2021-04-06 | End: 2021-04-07

## 2021-04-06 RX ORDER — BUDESONIDE 0.5 MG/2ML
0.5 INHALANT ORAL
Status: DISCONTINUED | OUTPATIENT
Start: 2021-04-06 | End: 2021-04-12

## 2021-04-06 RX ORDER — MIDAZOLAM HYDROCHLORIDE 2 MG/2ML
2 INJECTION, SOLUTION INTRAMUSCULAR; INTRAVENOUS ONCE
Status: DISCONTINUED | OUTPATIENT
Start: 2021-04-06 | End: 2021-04-08

## 2021-04-06 RX ORDER — ALBUTEROL SULFATE 90 UG/1
AEROSOL, METERED RESPIRATORY (INHALATION) AS NEEDED
Status: DISCONTINUED | OUTPATIENT
Start: 2021-04-06 | End: 2021-04-06

## 2021-04-06 RX ORDER — ALBUMIN, HUMAN INJ 5% 5 %
12.5 SOLUTION INTRAVENOUS ONCE
Status: COMPLETED | OUTPATIENT
Start: 2021-04-06 | End: 2021-04-06

## 2021-04-06 RX ORDER — SODIUM CHLORIDE, SODIUM LACTATE, POTASSIUM CHLORIDE, CALCIUM CHLORIDE 600; 310; 30; 20 MG/100ML; MG/100ML; MG/100ML; MG/100ML
INJECTION, SOLUTION INTRAVENOUS CONTINUOUS PRN
Status: DISCONTINUED | OUTPATIENT
Start: 2021-04-06 | End: 2021-04-06

## 2021-04-06 RX ORDER — DEXTROSE MONOHYDRATE 25 G/50ML
25 INJECTION, SOLUTION INTRAVENOUS ONCE
Status: COMPLETED | OUTPATIENT
Start: 2021-04-06 | End: 2021-04-06

## 2021-04-06 RX ORDER — SODIUM CHLORIDE, SODIUM GLUCONATE, SODIUM ACETATE, POTASSIUM CHLORIDE, MAGNESIUM CHLORIDE, SODIUM PHOSPHATE, DIBASIC, AND POTASSIUM PHOSPHATE .53; .5; .37; .037; .03; .012; .00082 G/100ML; G/100ML; G/100ML; G/100ML; G/100ML; G/100ML; G/100ML
100 INJECTION, SOLUTION INTRAVENOUS CONTINUOUS
Status: DISCONTINUED | OUTPATIENT
Start: 2021-04-06 | End: 2021-04-07

## 2021-04-06 RX ORDER — SODIUM CHLORIDE, SODIUM LACTATE, POTASSIUM CHLORIDE, CALCIUM CHLORIDE 600; 310; 30; 20 MG/100ML; MG/100ML; MG/100ML; MG/100ML
75 INJECTION, SOLUTION INTRAVENOUS CONTINUOUS
Status: DISCONTINUED | OUTPATIENT
Start: 2021-04-06 | End: 2021-04-06

## 2021-04-06 RX ORDER — HEPARIN SODIUM 5000 [USP'U]/ML
7500 INJECTION, SOLUTION INTRAVENOUS; SUBCUTANEOUS EVERY 8 HOURS SCHEDULED
Status: DISCONTINUED | OUTPATIENT
Start: 2021-04-06 | End: 2021-04-08

## 2021-04-06 RX ORDER — LEVALBUTEROL 1.25 MG/.5ML
1.25 SOLUTION, CONCENTRATE RESPIRATORY (INHALATION)
Status: DISCONTINUED | OUTPATIENT
Start: 2021-04-06 | End: 2021-04-12

## 2021-04-06 RX ORDER — ASPIRIN 81 MG/1
81 TABLET, CHEWABLE ORAL DAILY
Status: DISCONTINUED | OUTPATIENT
Start: 2021-04-06 | End: 2021-04-13

## 2021-04-06 RX ORDER — BUDESONIDE 0.5 MG/2ML
0.5 INHALANT ORAL EVERY 8 HOURS
Status: DISCONTINUED | OUTPATIENT
Start: 2021-04-06 | End: 2021-04-06

## 2021-04-06 RX ORDER — ALBUTEROL SULFATE 2.5 MG/3ML
SOLUTION RESPIRATORY (INHALATION)
Status: COMPLETED
Start: 2021-04-06 | End: 2021-04-06

## 2021-04-06 RX ORDER — LIDOCAINE HYDROCHLORIDE 10 MG/ML
0.5 INJECTION, SOLUTION EPIDURAL; INFILTRATION; INTRACAUDAL; PERINEURAL ONCE AS NEEDED
Status: DISCONTINUED | OUTPATIENT
Start: 2021-04-06 | End: 2021-04-06 | Stop reason: HOSPADM

## 2021-04-06 RX ORDER — CARVEDILOL 6.25 MG/1
6.25 TABLET ORAL 2 TIMES DAILY WITH MEALS
Status: DISCONTINUED | OUTPATIENT
Start: 2021-04-06 | End: 2021-04-06

## 2021-04-06 RX ORDER — SODIUM CHLORIDE, SODIUM LACTATE, POTASSIUM CHLORIDE, CALCIUM CHLORIDE 600; 310; 30; 20 MG/100ML; MG/100ML; MG/100ML; MG/100ML
50 INJECTION, SOLUTION INTRAVENOUS CONTINUOUS
Status: DISCONTINUED | OUTPATIENT
Start: 2021-04-06 | End: 2021-04-06

## 2021-04-06 RX ORDER — MAGNESIUM HYDROXIDE 1200 MG/15ML
LIQUID ORAL AS NEEDED
Status: DISCONTINUED | OUTPATIENT
Start: 2021-04-06 | End: 2021-04-06 | Stop reason: HOSPADM

## 2021-04-06 RX ORDER — CHLORHEXIDINE GLUCONATE 0.12 MG/ML
15 RINSE ORAL EVERY 12 HOURS SCHEDULED
Status: DISCONTINUED | OUTPATIENT
Start: 2021-04-06 | End: 2021-04-29 | Stop reason: HOSPADM

## 2021-04-06 RX ORDER — SODIUM CHLORIDE, SODIUM LACTATE, POTASSIUM CHLORIDE, CALCIUM CHLORIDE 600; 310; 30; 20 MG/100ML; MG/100ML; MG/100ML; MG/100ML
125 INJECTION, SOLUTION INTRAVENOUS CONTINUOUS
Status: DISCONTINUED | OUTPATIENT
Start: 2021-04-06 | End: 2021-04-06

## 2021-04-06 RX ORDER — ATORVASTATIN CALCIUM 80 MG/1
80 TABLET, FILM COATED ORAL
Status: DISCONTINUED | OUTPATIENT
Start: 2021-04-06 | End: 2021-04-13

## 2021-04-06 RX ORDER — SODIUM CHLORIDE, SODIUM GLUCONATE, SODIUM ACETATE, POTASSIUM CHLORIDE, MAGNESIUM CHLORIDE, SODIUM PHOSPHATE, DIBASIC, AND POTASSIUM PHOSPHATE .53; .5; .37; .037; .03; .012; .00082 G/100ML; G/100ML; G/100ML; G/100ML; G/100ML; G/100ML; G/100ML
1000 INJECTION, SOLUTION INTRAVENOUS ONCE
Status: COMPLETED | OUTPATIENT
Start: 2021-04-06 | End: 2021-04-07

## 2021-04-06 RX ORDER — HYDROMORPHONE HCL IN WATER/PF 6 MG/30 ML
0.2 PATIENT CONTROLLED ANALGESIA SYRINGE INTRAVENOUS
Status: DISCONTINUED | OUTPATIENT
Start: 2021-04-06 | End: 2021-04-07

## 2021-04-06 RX ORDER — SUCCINYLCHOLINE/SOD CL,ISO/PF 100 MG/5ML
SYRINGE (ML) INTRAVENOUS AS NEEDED
Status: DISCONTINUED | OUTPATIENT
Start: 2021-04-06 | End: 2021-04-06

## 2021-04-06 RX ORDER — ROCURONIUM BROMIDE 10 MG/ML
INJECTION, SOLUTION INTRAVENOUS AS NEEDED
Status: DISCONTINUED | OUTPATIENT
Start: 2021-04-06 | End: 2021-04-06

## 2021-04-06 RX ORDER — LIDOCAINE HYDROCHLORIDE 10 MG/ML
INJECTION, SOLUTION EPIDURAL; INFILTRATION; INTRACAUDAL; PERINEURAL AS NEEDED
Status: DISCONTINUED | OUTPATIENT
Start: 2021-04-06 | End: 2021-04-06

## 2021-04-06 RX ORDER — EPINEPHRINE 1 MG/ML
INJECTION, SOLUTION, CONCENTRATE INTRAVENOUS AS NEEDED
Status: DISCONTINUED | OUTPATIENT
Start: 2021-04-06 | End: 2021-04-06

## 2021-04-06 RX ORDER — FENTANYL CITRATE 50 UG/ML
50 INJECTION, SOLUTION INTRAMUSCULAR; INTRAVENOUS
Status: DISCONTINUED | OUTPATIENT
Start: 2021-04-06 | End: 2021-04-07

## 2021-04-06 RX ORDER — KETAMINE HYDROCHLORIDE 50 MG/ML
INJECTION, SOLUTION, CONCENTRATE INTRAMUSCULAR; INTRAVENOUS AS NEEDED
Status: DISCONTINUED | OUTPATIENT
Start: 2021-04-06 | End: 2021-04-06

## 2021-04-06 RX ORDER — AMLODIPINE BESYLATE 10 MG/1
10 TABLET ORAL DAILY
Status: DISCONTINUED | OUTPATIENT
Start: 2021-04-06 | End: 2021-04-06

## 2021-04-06 RX ORDER — PROPOFOL 10 MG/ML
INJECTION, EMULSION INTRAVENOUS AS NEEDED
Status: DISCONTINUED | OUTPATIENT
Start: 2021-04-06 | End: 2021-04-06

## 2021-04-06 RX ORDER — SODIUM CHLORIDE 9 MG/ML
INJECTION, SOLUTION INTRAVENOUS CONTINUOUS PRN
Status: DISCONTINUED | OUTPATIENT
Start: 2021-04-06 | End: 2021-04-06

## 2021-04-06 RX ORDER — LISINOPRIL 10 MG/1
10 TABLET ORAL DAILY
Status: DISCONTINUED | OUTPATIENT
Start: 2021-04-06 | End: 2021-04-06

## 2021-04-06 RX ORDER — ALBUTEROL SULFATE 2.5 MG/3ML
10 SOLUTION RESPIRATORY (INHALATION) ONCE
Status: COMPLETED | OUTPATIENT
Start: 2021-04-06 | End: 2021-04-06

## 2021-04-06 RX ORDER — CLINDAMYCIN PHOSPHATE 600 MG/50ML
600 INJECTION INTRAVENOUS EVERY 8 HOURS
Status: DISCONTINUED | OUTPATIENT
Start: 2021-04-06 | End: 2021-04-07

## 2021-04-06 RX ADMIN — LEVALBUTEROL HYDROCHLORIDE 1.25 MG: 1.25 SOLUTION, CONCENTRATE RESPIRATORY (INHALATION) at 13:38

## 2021-04-06 RX ADMIN — INSULIN LISPRO 4 UNITS: 100 INJECTION, SOLUTION INTRAVENOUS; SUBCUTANEOUS at 12:27

## 2021-04-06 RX ADMIN — CHLORHEXIDINE GLUCONATE 0.12% ORAL RINSE 15 ML: 1.2 LIQUID ORAL at 12:17

## 2021-04-06 RX ADMIN — EPINEPHRINE 0.01 MG: 1 INJECTION, SOLUTION INTRAMUSCULAR; SUBCUTANEOUS at 07:59

## 2021-04-06 RX ADMIN — ALBUTEROL SULFATE 12 PUFF: 90 AEROSOL, METERED RESPIRATORY (INHALATION) at 07:59

## 2021-04-06 RX ADMIN — ALBUTEROL SULFATE 10 MG: 2.5 SOLUTION RESPIRATORY (INHALATION) at 19:49

## 2021-04-06 RX ADMIN — SODIUM CHLORIDE, SODIUM GLUCONATE, SODIUM ACETATE, POTASSIUM CHLORIDE, MAGNESIUM CHLORIDE, SODIUM PHOSPHATE, DIBASIC, AND POTASSIUM PHOSPHATE 1000 ML: .53; .5; .37; .037; .03; .012; .00082 INJECTION, SOLUTION INTRAVENOUS at 23:07

## 2021-04-06 RX ADMIN — ATORVASTATIN CALCIUM 80 MG: 80 TABLET, FILM COATED ORAL at 17:09

## 2021-04-06 RX ADMIN — FENTANYL CITRATE 50 MCG: 50 INJECTION INTRAMUSCULAR; INTRAVENOUS at 07:49

## 2021-04-06 RX ADMIN — FUROSEMIDE 20 MG: 10 INJECTION, SOLUTION INTRAMUSCULAR; INTRAVENOUS at 13:13

## 2021-04-06 RX ADMIN — FENTANYL CITRATE 100 MCG: 50 INJECTION INTRAMUSCULAR; INTRAVENOUS at 13:50

## 2021-04-06 RX ADMIN — SODIUM CHLORIDE 0.7 MCG/KG/HR: 9 INJECTION, SOLUTION INTRAVENOUS at 18:24

## 2021-04-06 RX ADMIN — SODIUM CHLORIDE, SODIUM GLUCONATE, SODIUM ACETATE, POTASSIUM CHLORIDE, MAGNESIUM CHLORIDE, SODIUM PHOSPHATE, DIBASIC, AND POTASSIUM PHOSPHATE 100 ML/HR: .53; .5; .37; .037; .03; .012; .00082 INJECTION, SOLUTION INTRAVENOUS at 21:42

## 2021-04-06 RX ADMIN — PHENYLEPHRINE HYDROCHLORIDE 50 MCG/MIN: 10 INJECTION INTRAVENOUS at 08:05

## 2021-04-06 RX ADMIN — PHENYLEPHRINE HYDROCHLORIDE 200 MCG: 10 INJECTION INTRAVENOUS at 08:10

## 2021-04-06 RX ADMIN — SODIUM CHLORIDE, SODIUM LACTATE, POTASSIUM CHLORIDE, AND CALCIUM CHLORIDE 125 ML/HR: .6; .31; .03; .02 INJECTION, SOLUTION INTRAVENOUS at 11:27

## 2021-04-06 RX ADMIN — FENTANYL CITRATE 50 MCG: 50 INJECTION INTRAMUSCULAR; INTRAVENOUS at 09:56

## 2021-04-06 RX ADMIN — LIDOCAINE HYDROCHLORIDE 100 MG: 10 INJECTION, SOLUTION EPIDURAL; INFILTRATION; INTRACAUDAL; PERINEURAL at 07:48

## 2021-04-06 RX ADMIN — INSULIN HUMAN 10 UNITS: 100 INJECTION, SOLUTION PARENTERAL at 19:47

## 2021-04-06 RX ADMIN — LEVALBUTEROL HYDROCHLORIDE 1.25 MG: 1.25 SOLUTION, CONCENTRATE RESPIRATORY (INHALATION) at 19:51

## 2021-04-06 RX ADMIN — ASPIRIN 81 MG: 81 TABLET, CHEWABLE ORAL at 12:17

## 2021-04-06 RX ADMIN — ALBUMIN (HUMAN) 12.5 G: 12.5 INJECTION, SOLUTION INTRAVENOUS at 17:10

## 2021-04-06 RX ADMIN — Medication 100 MCG/HR: at 11:56

## 2021-04-06 RX ADMIN — PROPOFOL 50 MG: 10 INJECTION, EMULSION INTRAVENOUS at 07:55

## 2021-04-06 RX ADMIN — AMLODIPINE BESYLATE 10 MG: 10 TABLET ORAL at 12:17

## 2021-04-06 RX ADMIN — Medication 200 MG: at 07:56

## 2021-04-06 RX ADMIN — INSULIN LISPRO 4 UNITS: 100 INJECTION, SOLUTION INTRAVENOUS; SUBCUTANEOUS at 17:38

## 2021-04-06 RX ADMIN — CLINDAMYCIN IN 5 PERCENT DEXTROSE 600 MG: 12 INJECTION, SOLUTION INTRAVENOUS at 17:09

## 2021-04-06 RX ADMIN — FENTANYL CITRATE 50 MCG: 50 INJECTION INTRAMUSCULAR; INTRAVENOUS at 22:18

## 2021-04-06 RX ADMIN — LISINOPRIL 10 MG: 10 TABLET ORAL at 12:17

## 2021-04-06 RX ADMIN — SODIUM CHLORIDE: 0.9 INJECTION, SOLUTION INTRAVENOUS at 07:50

## 2021-04-06 RX ADMIN — KETAMINE HYDROCHLORIDE 10 MG: 50 INJECTION, SOLUTION INTRAMUSCULAR; INTRAVENOUS at 08:18

## 2021-04-06 RX ADMIN — SODIUM CHLORIDE, SODIUM LACTATE, POTASSIUM CHLORIDE, AND CALCIUM CHLORIDE: .6; .31; .03; .02 INJECTION, SOLUTION INTRAVENOUS at 07:40

## 2021-04-06 RX ADMIN — IPRATROPIUM BROMIDE 0.5 MG: 0.5 SOLUTION RESPIRATORY (INHALATION) at 19:51

## 2021-04-06 RX ADMIN — PERFLUTREN 0.6 ML/MIN: 6.52 INJECTION, SUSPENSION INTRAVENOUS at 18:30

## 2021-04-06 RX ADMIN — ROCURONIUM BROMIDE 30 MG: 50 INJECTION, SOLUTION INTRAVENOUS at 07:57

## 2021-04-06 RX ADMIN — BUDESONIDE 0.5 MG: 0.5 INHALANT ORAL at 19:51

## 2021-04-06 RX ADMIN — SODIUM CHLORIDE 0.4 MCG/KG/HR: 9 INJECTION, SOLUTION INTRAVENOUS at 12:22

## 2021-04-06 RX ADMIN — RACEPINEPHRINE HYDROCHLORIDE 0.5 ML: 11.25 SOLUTION RESPIRATORY (INHALATION) at 09:08

## 2021-04-06 RX ADMIN — SODIUM CHLORIDE 0.5 MCG/KG/HR: 9 INJECTION, SOLUTION INTRAVENOUS at 23:07

## 2021-04-06 RX ADMIN — PROPOFOL 50 MG: 10 INJECTION, EMULSION INTRAVENOUS at 09:18

## 2021-04-06 RX ADMIN — ALBUTEROL SULFATE 6 PUFF: 90 AEROSOL, METERED RESPIRATORY (INHALATION) at 09:55

## 2021-04-06 RX ADMIN — TICAGRELOR 90 MG: 90 TABLET ORAL at 12:17

## 2021-04-06 RX ADMIN — MIDAZOLAM 1 MG: 1 INJECTION INTRAMUSCULAR; INTRAVENOUS at 07:46

## 2021-04-06 RX ADMIN — TICAGRELOR 90 MG: 90 TABLET ORAL at 22:30

## 2021-04-06 RX ADMIN — Medication 100 MCG/HR: at 19:38

## 2021-04-06 RX ADMIN — HYDROCORTISONE SODIUM SUCCINATE 100 MG: 100 INJECTION, POWDER, FOR SOLUTION INTRAMUSCULAR; INTRAVENOUS at 08:02

## 2021-04-06 RX ADMIN — SODIUM CHLORIDE 1000 ML: 0.9 INJECTION, SOLUTION INTRAVENOUS at 20:07

## 2021-04-06 RX ADMIN — INSULIN LISPRO 4 UNITS: 100 INJECTION, SOLUTION INTRAVENOUS; SUBCUTANEOUS at 23:34

## 2021-04-06 RX ADMIN — FENTANYL CITRATE 50 MCG: 50 INJECTION INTRAMUSCULAR; INTRAVENOUS at 12:11

## 2021-04-06 RX ADMIN — ROCURONIUM BROMIDE 20 MG: 50 INJECTION, SOLUTION INTRAVENOUS at 09:08

## 2021-04-06 RX ADMIN — IOHEXOL 100 ML: 350 INJECTION, SOLUTION INTRAVENOUS at 16:08

## 2021-04-06 RX ADMIN — FENTANYL CITRATE 50 MCG: 50 INJECTION INTRAMUSCULAR; INTRAVENOUS at 11:37

## 2021-04-06 RX ADMIN — IPRATROPIUM BROMIDE 0.5 MG: 0.5 SOLUTION RESPIRATORY (INHALATION) at 13:38

## 2021-04-06 RX ADMIN — PHENYLEPHRINE HYDROCHLORIDE 200 MCG: 10 INJECTION INTRAVENOUS at 10:20

## 2021-04-06 RX ADMIN — HEPARIN SODIUM 7500 UNITS: 5000 INJECTION INTRAVENOUS; SUBCUTANEOUS at 22:29

## 2021-04-06 RX ADMIN — CHLORHEXIDINE GLUCONATE 0.12% ORAL RINSE 15 ML: 1.2 LIQUID ORAL at 22:30

## 2021-04-06 RX ADMIN — DEXTROSE MONOHYDRATE 25 ML: 500 INJECTION PARENTERAL at 19:48

## 2021-04-06 RX ADMIN — ROCURONIUM BROMIDE 20 MG: 50 INJECTION, SOLUTION INTRAVENOUS at 10:43

## 2021-04-06 RX ADMIN — ROCURONIUM BROMIDE 30 MG: 50 INJECTION, SOLUTION INTRAVENOUS at 09:32

## 2021-04-06 RX ADMIN — CLINDAMYCIN IN 5 PERCENT DEXTROSE 600 MG: 12 INJECTION, SOLUTION INTRAVENOUS at 23:07

## 2021-04-06 RX ADMIN — HEPARIN SODIUM 7500 UNITS: 5000 INJECTION INTRAVENOUS; SUBCUTANEOUS at 17:10

## 2021-04-06 RX ADMIN — PHENYLEPHRINE HYDROCHLORIDE 100 MCG: 10 INJECTION INTRAVENOUS at 07:48

## 2021-04-06 RX ADMIN — SODIUM CHLORIDE 1 MCG/KG/HR: 9 INJECTION, SOLUTION INTRAVENOUS at 15:07

## 2021-04-06 RX ADMIN — ALBUTEROL SULFATE 2.5 MG: 2.5 SOLUTION RESPIRATORY (INHALATION) at 12:22

## 2021-04-06 RX ADMIN — KETAMINE HYDROCHLORIDE 10 MG: 50 INJECTION, SOLUTION INTRAMUSCULAR; INTRAVENOUS at 10:04

## 2021-04-06 RX ADMIN — ROCURONIUM BROMIDE 20 MG: 50 INJECTION, SOLUTION INTRAVENOUS at 10:19

## 2021-04-06 RX ADMIN — PROPOFOL 50 MG: 10 INJECTION, EMULSION INTRAVENOUS at 11:17

## 2021-04-06 RX ADMIN — CLINDAMYCIN PHOSPHATE 900 MG: 900 INJECTION, SOLUTION INTRAVENOUS at 08:07

## 2021-04-06 RX ADMIN — PROPOFOL 250 MG: 10 INJECTION, EMULSION INTRAVENOUS at 07:49

## 2021-04-06 NOTE — OP NOTE
OPERATIVE REPORT  PATIENT NAME: Julia Bojorquez    :  1980  MRN: [id number]  Pt Location: [facility] OR ROOM 05    SURGERY DATE: 2021    Surgeon(s) and Role:     * Fletcher Mendez MD - Primary     * Priyank Machado MD - Assisting     * Aletha Barajas MD - Assisting    Preop Diagnosis:  Skin lesion of left leg [L98 9]    Post-Op Diagnosis Codes:     * Skin lesion of left leg [L98 9]    Procedure(s) (LRB):  EXCISION THIGH MASS (Left)  APPLICATION VAC DRESSING THIGH (Left)    Specimen(s):  ID Type Source Tests Collected by Time Destination   1 : LEFT THIGH/GROIN MASS Tissue Mass TISSUE Aruna Duffy MD 2021 0947        Estimated Blood Loss:   500 mL    Drains:  Negative Pressure Wound Therapy (V A C ) Other (Comment) (Active)   Wound Length (cm) 35 cm 21 1016   Wound Width (cm) 13 cm 21 1016   Wound Depth (cm) 4 cm 21 1016   Wound Surface Area (cm^2) 455 cm^2 21 1016   Wound Volume (cm^3) 2291 cm^3 21 1016   Calculated Wound Volume (cm^3) 1820 cm^3 21 1016   Black foam- # applied 2 21 1016   Cycle Continuous 21 1016   Target Pressure (mmHg) 125 21 1016   Canister Changed Other (Comment) 21 1016   Number of days: 0       NG/OG/Enteral Tube Orogastric 18 Fr (Active)   Number of days: 0       Urethral Catheter Latex 16 Fr  (Active)   Output (mL) 200 mL 21 1110   Number of days: 0       Anesthesia Type:   General    Operative Indications:  Skin lesion of left leg [L98 9]    Operative Findings:  Large soft tissue mass of left medial thigh associated with patient's known hidradenitis  Some spontaneous purulent discharge appreciated following positioning  Numerous large veins associated with mass    Complications:   None    Procedure and Technique:  Patient was brought to the operating room where he was identified verbally and via hospital supplied armband  He was transferred to the operating room table in the supine position    General anesthesia was induced and the patient was endotracheally intubated  He was then placed in the lithotomy position  The left thigh and groin were prepped and draped in usual sterile fashion  A brief time-out was performed confirming the correct patient, procedure, site and with all parties in agreement we proceeded  Given the patient's anti-platelet therapy with Brilinta, it was decided to make our skin incision with electrocautery  This was a planned to incorporate a margin of healthy appearing tissue based on visual appearance and palpation as the majority of the mass had demonstrated a woody edema  This was carried down circumferentially to a superficial layer of fat and then we focused our dissection along the superior/proximal aspect of the mass to allow for gravity to aid in exposure as we dissected inferiorly/distally  The mass was carefully dissected with electrocautery back to healthy appearing soft tissue with no evidence of active hidradenitis suppurativa  Numerous large veins were carefully identified in close association with the mass, these were ligated with 2-0 silk ties  Following complete excision of the mass the superior aspect was reapproximated using 2-0 nylon in vertical mattress fashion  The remaining defect measured 35 x 13 x 4 cm  The wound bed was irrigated with sterile saline inspected for hemostasis was was achieved with electrocautery and deemed satisfactory  An extra-large VAC dressing was applied using x2 black sponges and set to 125 mmHg of continuous suction with good seal   At the conclusion of the case, all sponge, needle, instrument counts were correct  Given the patient's body habitus and the difficulty of the intubation, it was decided to leave him intubated and brought back to the ICU where he could be monitored and safely wean from the vent  Dr Leonardo Lua was present for the entire procedure      Patient Disposition:  Critical Care Unit and intubated and hemodynamically stable      SIGNATURE: Stephanie Santoor MD  DATE: April 6, 2021  TIME: 11:34 AM

## 2021-04-06 NOTE — NURSING NOTE
Patient received from OR ventilated with sats in low to mid 80s, Respiratory and CCSX at bedside to assess  UO low as well, see orders  Will continue to monitor

## 2021-04-06 NOTE — ANESTHESIA POSTPROCEDURE EVALUATION
Post-Op Assessment Note    CV Status:  Stable  Pain Score: 0    Pain management: adequate     Mental Status:  Unresponsive   PONV Controlled:  None   Airway Patency:  Patent  Airway: intubated      Post Op Vitals Reviewed: Yes      Staff: CRNA, Anesthesiologist   Comments: Pt transported on monitor and hand ventilated  VSS with transport  No complications documented      BP   140/81   Temp      Pulse  80   Resp   on ventilator   SpO2   96%

## 2021-04-06 NOTE — INTERVAL H&P NOTE
H&P reviewed  After examining the patient I find no changes in the patients condition since the H&P had been written      Vitals:    04/06/21 0614   BP: 107/69   Pulse: 77   Resp: 16   Temp: 97 7 °F (36 5 °C)   SpO2: 96%

## 2021-04-06 NOTE — ANESTHESIA PREPROCEDURE EVALUATION
Procedure:  EXCISION BIOPSY TISSUE LESION/MASS UPPER EXTREMITY/ LEG (Left Leg Upper)  EXCISION HIDRADENITIS LEFT LEG (Left Leg Upper)  LEG APPLICATION VAC DRESSING (Left Leg Upper)    Relevant Problems   CARDIO   (+) CAD (coronary artery disease)   (+) HTN (hypertension)   (+) Hypertriglyceridemia   (+) Type 1 non-ST elevation myocardial infarction (NSTEMI) (HCC)      ENDO   (+) DM type 2, uncontrolled, with neuropathy (HCC)        Physical Exam    Airway    Mallampati score: II         Dental       Cardiovascular  Rhythm: regular, Rate: normal,     Pulmonary  Breath sounds clear to auscultation,     Other Findings        Anesthesia Plan  ASA Score- 4     Anesthesia Type- general with ASA Monitors  Additional Monitors:   Airway Plan: LMA  Plan Factors-    Chart reviewed  EKG reviewed  Patient is a current smoker  Induction- intravenous  Postoperative Plan- Plan for postoperative opioid use  Informed Consent- Anesthetic plan and risks discussed with patient  I personally reviewed this patient with the CRNA  Discussed and agreed on the Anesthesia Plan with the CRNA  Jesus Gallegos

## 2021-04-06 NOTE — PROGRESS NOTES
Daily Progress Note - Critical Care   Crystal Boone 45 y o  male MRN: [id number]  Unit/Bed#: [facility] 515-01 Encounter: [id number]        ----------------------------------------------------------------------------------------  HPI/24hr events: 45yo male POD#0 s/p excision of left thigh soft tissue mass and wound vac placement  He was following up in the office with the surgery department for a left thigh w/ soft tissue mass + hidradenitis on posterior thigh w/ cellulitis  He is intubated secondary to episodes of bronchospasms in the operating room  ---------------------------------------------------------------------------------------  SUBJECTIVE  Patient currently intubated      Review of Systems  Review of systems was unable to be performed secondary to intubation  ---------------------------------------------------------------------------------------  Assessment and Plan:    Neuro:    Post operative sedation analgesia   - precedex for agitation, fentanyl drip for pain    CV:    Diagnosis: CAD s/p MI and PCI, on DAPT since 12/20  o Plan: continue home meds, lisinopril 10mg qd, amlodipine 10mg qd, baby aspirin daily, carvedilol 6 25mg BID, brilinta 90mg BID  o f/u TTE  o Check BNP to evaluate for volume overload    Diagnosis: Hyperlipidemia  o Plan: continue home regimen, atorvastatin 80mg qd      Pulm:   Diagnosis: intubation secondary to bronchospasm  o Plan: neb q8h  o Peridex q12h  o Plan to  Extubate as soon as patient tolerates trial off ventilation  o CXR pending read, appears grossly normal     GI:   OG tube in place  No GI ppx indicated at this time, will consider if pt remains intubated for extended period of time    :   Robbins catheter in place, monitor UOP    F/E/N:     cc/h   Diet: NPO    Heme/Onc:    DVT ppx  o Heparin 7500 units q8h    Endo:    Diagnosis: DM  o Plan: hold home regimen of glimepiride 4mg qd and metformin  o Sliding scale insulin  o Last fingerstick glucose 238    ID:   S/p mass excision, hx hydradenitis suppurativa with wound vac in place  - Continue clindamycin q8h      MSK/Skin:    Diagnosis: s/p mass excision, hx hydradenitis suppurativa  o Plan: clindamycin 600mg IV q8h, wound vac in place      Disposition: Admit to Critical Care   Code Status: Level 1 - Full Code  ---------------------------------------------------------------------------------------  ICU CORE MEASURES    Prophylaxis   VTE Pharmacologic Prophylaxis: Heparin  VTE Mechanical Prophylaxis: sequential compression device  Stress Ulcer Prophylaxis: Prophylaxis Not Indicated     ABCDE Protocol (if indicated)  Plan to perform spontaneous awakening trial today? Not applicable  Plan to perform spontaneous breathing trial today? Yes  Obvious barriers to extubation? Yes  CAM-ICU: Positive    Invasive Devices Review  Invasive Devices     Peripheral Intravenous Line            Peripheral IV 21 Left Hand less than 1 day    Peripheral IV 21 Right Hand less than 1 day          Drain            NG/OG/Enteral Tube Orogastric 18 Fr less than 1 day    Negative Pressure Wound Therapy (V A C ) Other (Comment) less than 1 day    Urethral Catheter Latex 16 Fr  less than 1 day          Airway            ETT  Cuffed;Oral 8 mm less than 1 day              Can any invasive devices be discontinued today?  No  ---------------------------------------------------------------------------------------  OBJECTIVE    Vitals   Vitals:    21 0614 21 0634 21 1111   BP: 107/69     Pulse: 77     Resp: 16     Temp: 97 7 °F (36 5 °C)     TempSrc: Tympanic     SpO2: 96%  96%   Weight:  (!) 168 kg (370 lb)    Height:  5' 6 75" (1 695 m)      Temp (24hrs), Av 7 °F (36 5 °C), Min:97 7 °F (36 5 °C), Max:97 7 °F (36 5 °C)  Current: Temperature: 97 7 °F (36 5 °C)    Respiratory:  SpO2: SpO2: 96 %       Invasive/non-invasive ventilation settings   Respiratory    Lab Data (Last 4 hours)    None         O2/Vent Data (Last 4 hours)      04/06 1111 04/06 1125         Vent Mode AC/VC       Resp Rate (BPM) (BPM) 15       Vt (mL) (mL) 500       FIO2 (%) (%) 100 60      PEEP (cmH2O) (cmH2O) 12 10      Patient safety screen outcome: Failed       MV 7 59                   Physical Exam  Constitutional:       Appearance: He is obese  HENT:      Head: Normocephalic and atraumatic  Nose: Nose normal    Cardiovascular:      Rate and Rhythm: Normal rate and regular rhythm  Pulmonary:      Effort: Pulmonary effort is normal       Breath sounds: Normal breath sounds  Genitourinary:     Penis: Normal     Musculoskeletal:      Comments: SCDs in place bilaterally   Skin:     General: Skin is warm and dry  Comments: Wound vac in place in the left groin         Laboratory and Diagnostics:        Invalid input(s):  EOSPCT                    Imaging:I have personally reviewed pertinent reports  Intake and Output  I/O       04/04 0701 - 04/05 0700 04/05 0701 - 04/06 0700 04/06 0701 - 04/07 0700    I V  (mL/kg)   1000 (6)    Total Intake(mL/kg)   1000 (6)    Urine (mL/kg/hr)   200 (0 3)    Blood   500    Total Output   700    Net   +300                 Height and Weights   Height: 5' 6 75" (169 5 cm)  IBW: 65 53 kg  Body mass index is 58 39 kg/m²  Weight (last 2 days)     Date/Time   Weight    04/06/21 0634   (!) 168 (370)                Nutrition       Diet Orders   (From admission, onward)             Start     Ordered    04/06/21 1114  Diet NPO  Diet effective now     Question Answer Comment   Diet Type NPO    RD to adjust diet per protocol?  No        04/06/21 1116                  Active Medications  Scheduled Meds:  Current Facility-Administered Medications   Medication Dose Route Frequency Provider Last Rate    amLODIPine  10 mg Per NG Tube Daily Joe Ceballos MD      aspirin  81 mg Per NG Tube Daily Joe Ceballos MD      atorvastatin  80 mg Per NG Tube Daily With Pk Bangura MD      budesonide  0 5 mg Nebulization Q8H Lilian Guallpa PA-C      carvedilol  6 25 mg Per NG Tube BID With Meals Brandon Howard MD      chlorhexidine  15 mL Swish & Spit Q12H [facility] Tamia Mckeon MD      [START ON 4/13/2021] clindamycin  600 mg Intravenous Q8H Lilian Guallpa PA-C      fentaNYL  100 mcg/hr Intravenous Continuous Brandon Howard MD      fentanyl citrate (PF)  50 mcg Intravenous Q1H PRN Lilian Guallpa PA-C      heparin (porcine)  7,500 Units Subcutaneous [facility] Tamia Mckeon MD      insulin lispro  2-12 Units Subcutaneous Q6H [facility] Brandon oHward MD      ipratropium  0 5 mg Nebulization Q8H Lilian Guallpa PA-C      lactated ringers  125 mL/hr Intravenous Continuous Tamia Mckeon  mL/hr (04/06/21 1127)    levalbuterol  1 25 mg Nebulization Q8H Lilian Guallpa PA-C      lisinopril  10 mg Per NG Tube Daily Brandon Howard MD      ticagrelor  90 mg Per NG Tube Q12H [facility] Brandon Howard MD       Continuous Infusions:  fentaNYL, 100 mcg/hr  lactated ringers, 125 mL/hr, Last Rate: 125 mL/hr (04/06/21 1127)      PRN Meds:   fentanyl citrate (PF), 50 mcg, Q1H PRN        Allergies   Allergies   Allergen Reactions    Amoxicillin Hives     ---------------------------------------------------------------------------------------  Advance Directive and Living Will:      Power of :    POLST:    ---------------------------------------------------------------------------------------  Care Time Delivered:   No Critical Care time spent     Brandon Howard MD

## 2021-04-06 NOTE — RESPIRATORY THERAPY NOTE
RT Protocol Note  Josefa Mitchell 45 y o  male MRN: [id number]  Unit/Bed#: [facility] 579-91 Encounter: [id number]    Assessment    Active Problems:    * No active hospital problems   *      Home Pulmonary Medications:  None       Past Medical History:   Diagnosis Date    Coronary artery disease     Diabetes mellitus ([place] [address] )     Heart disease     CAD   s/p ptca with 1 stent 2012    Hidradenitis suppurativa     groin    Hyperlipidemia     Hypertension     MI (myocardial infarction) ([place] [address] )     " silent " M I  in the past    Morbid obesity with BMI of 50 0-59 9, adult ([place] [address] )     Myocardial infarction ([place] [address] )     Nicotine dependence     Obesity     Pilonidal cyst      Social History     Socioeconomic History    Marital status: Single     Spouse name: None    Number of children: None    Years of education: None    Highest education level: None   Occupational History    None   Social Needs    Financial resource strain: None    Food insecurity     Worry: None     Inability: None    Transportation needs     Medical: None     Non-medical: None   Tobacco Use    Smoking status: Former Smoker     Packs/day: 1 00     Years: 20 00     Pack years: 20 00     Types: Cigarettes    Smokeless tobacco: Never Used   Substance and Sexual Activity    Alcohol use: Not Currently     Comment: rarely    Drug use: No    Sexual activity: None   Lifestyle    Physical activity     Days per week: None     Minutes per session: None    Stress: None   Relationships    Social connections     Talks on phone: None     Gets together: None     Attends [employer] service: None     Active member of club or organization: None     Attends meetings of clubs or organizations: None     Relationship status: None    Intimate partner violence     Fear of current or ex partner: None     Emotionally abused: None     Physically abused: None     Forced sexual activity: None   Other Topics Concern    None   Social History Narrative    None Subjective         Objective    Physical Exam:   Assessment Type: Assess only  General Appearance: Sedated  Respiratory Pattern: Assisted, Normal  Chest Assessment: Chest expansion symmetrical  Bilateral Breath Sounds: Diminished    Vitals:  Blood pressure 107/69, pulse 77, temperature 97 7 °F (36 5 °C), temperature source Tympanic, resp  rate 16, height 5' 6 75" (1 695 m), weight (!) 168 kg (370 lb), SpO2 96 %  Imaging and other studies: I have personally reviewed pertinent reports  Plan    Respiratory Plan: Vent/NIV/HFNC        Resp Comments: pt is post-op that came to the floor vented, pt was bagged from the OR and placed on vent  Pt is tolerating setting well  no changes made at this time  Pt does not take any home respiratory meds, but did recently start smoking again  Pt went into broncho spasm in the OR and they had a hard time oxygenating him  Pt SAT is 98% on 100% and 12 peep   Will cont to wean vent settings to maintain SAT > 88%

## 2021-04-07 ENCOUNTER — APPOINTMENT (INPATIENT)
Dept: NON INVASIVE DIAGNOSTICS | Facility: HOSPITAL | Age: 41
DRG: 570 | End: 2021-04-07
Payer: COMMERCIAL

## 2021-04-07 LAB
ALBUMIN SERPL BCP-MCNC: 2.1 G/DL (ref 3.5–5)
ALP SERPL-CCNC: 63 U/L (ref 46–116)
ALT SERPL W P-5'-P-CCNC: 15 U/L (ref 12–78)
ANION GAP SERPL CALCULATED.3IONS-SCNC: 6 MMOL/L (ref 4–13)
AST SERPL W P-5'-P-CCNC: 27 U/L (ref 5–45)
ATRIAL RATE: 64 BPM
ATRIAL RATE: 67 BPM
BASOPHILS # BLD AUTO: 0.03 THOUSANDS/ΜL (ref 0–0.1)
BASOPHILS NFR BLD AUTO: 0 % (ref 0–1)
BILIRUB SERPL-MCNC: 0.47 MG/DL (ref 0.2–1)
BUN SERPL-MCNC: 35 MG/DL (ref 5–25)
CALCIUM ALBUM COR SERPL-MCNC: 8.5 MG/DL (ref 8.3–10.1)
CALCIUM SERPL-MCNC: 7 MG/DL (ref 8.3–10.1)
CHLORIDE SERPL-SCNC: 109 MMOL/L (ref 100–108)
CO2 SERPL-SCNC: 23 MMOL/L (ref 21–32)
CREAT SERPL-MCNC: 1.91 MG/DL (ref 0.6–1.3)
EOSINOPHIL # BLD AUTO: 0.05 THOUSAND/ΜL (ref 0–0.61)
EOSINOPHIL NFR BLD AUTO: 0 % (ref 0–6)
ERYTHROCYTE [DISTWIDTH] IN BLOOD BY AUTOMATED COUNT: 13.9 % (ref 11.6–15.1)
GFR SERPL CREATININE-BSD FRML MDRD: 43 ML/MIN/1.73SQ M
GLUCOSE SERPL-MCNC: 102 MG/DL (ref 65–140)
GLUCOSE SERPL-MCNC: 147 MG/DL (ref 65–140)
GLUCOSE SERPL-MCNC: 149 MG/DL (ref 65–140)
GLUCOSE SERPL-MCNC: 151 MG/DL (ref 65–140)
GLUCOSE SERPL-MCNC: 167 MG/DL (ref 65–140)
GLUCOSE SERPL-MCNC: 174 MG/DL (ref 65–140)
HCT VFR BLD AUTO: 31.5 % (ref 36.5–49.3)
HGB BLD-MCNC: 9.5 G/DL (ref 12–17)
IMM GRANULOCYTES # BLD AUTO: 0.04 THOUSAND/UL (ref 0–0.2)
IMM GRANULOCYTES NFR BLD AUTO: 0 % (ref 0–2)
LYMPHOCYTES # BLD AUTO: 1.47 THOUSANDS/ΜL (ref 0.6–4.47)
LYMPHOCYTES NFR BLD AUTO: 11 % (ref 14–44)
MAGNESIUM SERPL-MCNC: 1.9 MG/DL (ref 1.6–2.6)
MCH RBC QN AUTO: 27.5 PG (ref 26.8–34.3)
MCHC RBC AUTO-ENTMCNC: 30.2 G/DL (ref 31.4–37.4)
MCV RBC AUTO: 91 FL (ref 82–98)
MONOCYTES # BLD AUTO: 1.02 THOUSAND/ΜL (ref 0.17–1.22)
MONOCYTES NFR BLD AUTO: 8 % (ref 4–12)
NEUTROPHILS # BLD AUTO: 10.53 THOUSANDS/ΜL (ref 1.85–7.62)
NEUTS SEG NFR BLD AUTO: 81 % (ref 43–75)
NRBC BLD AUTO-RTO: 0 /100 WBCS
P AXIS: 39 DEGREES
P AXIS: 74 DEGREES
PHOSPHATE SERPL-MCNC: 4.7 MG/DL (ref 2.7–4.5)
PLATELET # BLD AUTO: 320 THOUSANDS/UL (ref 149–390)
PMV BLD AUTO: 9.8 FL (ref 8.9–12.7)
POTASSIUM SERPL-SCNC: 5.7 MMOL/L (ref 3.5–5.3)
PR INTERVAL: 142 MS
PR INTERVAL: 175 MS
PROT SERPL-MCNC: 6.5 G/DL (ref 6.4–8.2)
QRS AXIS: -39 DEGREES
QRS AXIS: -42 DEGREES
QRSD INTERVAL: 79 MS
QRSD INTERVAL: 92 MS
QT INTERVAL: 396 MS
QT INTERVAL: 396 MS
QTC INTERVAL: 409 MS
QTC INTERVAL: 418 MS
RBC # BLD AUTO: 3.46 MILLION/UL (ref 3.88–5.62)
SODIUM SERPL-SCNC: 138 MMOL/L (ref 136–145)
T WAVE AXIS: 63 DEGREES
T WAVE AXIS: 63 DEGREES
VENTRICULAR RATE: 64 BPM
VENTRICULAR RATE: 67 BPM
WBC # BLD AUTO: 13.14 THOUSAND/UL (ref 4.31–10.16)

## 2021-04-07 PROCEDURE — 80053 COMPREHEN METABOLIC PANEL: CPT | Performed by: EMERGENCY MEDICINE

## 2021-04-07 PROCEDURE — 82948 REAGENT STRIP/BLOOD GLUCOSE: CPT

## 2021-04-07 PROCEDURE — 99233 SBSQ HOSP IP/OBS HIGH 50: CPT | Performed by: SURGERY

## 2021-04-07 PROCEDURE — 84100 ASSAY OF PHOSPHORUS: CPT | Performed by: EMERGENCY MEDICINE

## 2021-04-07 PROCEDURE — 85025 COMPLETE CBC W/AUTO DIFF WBC: CPT | Performed by: SURGERY

## 2021-04-07 PROCEDURE — 93970 EXTREMITY STUDY: CPT | Performed by: SURGERY

## 2021-04-07 PROCEDURE — 94760 N-INVAS EAR/PLS OXIMETRY 1: CPT

## 2021-04-07 PROCEDURE — 93010 ELECTROCARDIOGRAM REPORT: CPT | Performed by: INTERNAL MEDICINE

## 2021-04-07 PROCEDURE — 83735 ASSAY OF MAGNESIUM: CPT | Performed by: SURGERY

## 2021-04-07 PROCEDURE — 94003 VENT MGMT INPAT SUBQ DAY: CPT

## 2021-04-07 PROCEDURE — 93970 EXTREMITY STUDY: CPT

## 2021-04-07 PROCEDURE — 93306 TTE W/DOPPLER COMPLETE: CPT | Performed by: INTERNAL MEDICINE

## 2021-04-07 PROCEDURE — 99024 POSTOP FOLLOW-UP VISIT: CPT | Performed by: SURGERY

## 2021-04-07 PROCEDURE — 94640 AIRWAY INHALATION TREATMENT: CPT

## 2021-04-07 PROCEDURE — 94664 DEMO&/EVAL PT USE INHALER: CPT

## 2021-04-07 RX ORDER — OXYCODONE HYDROCHLORIDE 10 MG/1
10 TABLET ORAL EVERY 4 HOURS PRN
Status: DISCONTINUED | OUTPATIENT
Start: 2021-04-07 | End: 2021-04-08

## 2021-04-07 RX ORDER — HYDROMORPHONE HCL/PF 1 MG/ML
0.5 SYRINGE (ML) INJECTION EVERY 2 HOUR PRN
Status: DISCONTINUED | OUTPATIENT
Start: 2021-04-07 | End: 2021-04-08

## 2021-04-07 RX ORDER — CEFAZOLIN SODIUM 2 G/50ML
2000 SOLUTION INTRAVENOUS EVERY 8 HOURS
Status: DISCONTINUED | OUTPATIENT
Start: 2021-04-07 | End: 2021-04-14

## 2021-04-07 RX ORDER — HYDROMORPHONE HCL/PF 1 MG/ML
1 SYRINGE (ML) INJECTION ONCE
Status: COMPLETED | OUTPATIENT
Start: 2021-04-07 | End: 2021-04-07

## 2021-04-07 RX ORDER — OXYCODONE HYDROCHLORIDE 5 MG/1
5 TABLET ORAL EVERY 4 HOURS PRN
Status: DISCONTINUED | OUTPATIENT
Start: 2021-04-07 | End: 2021-04-08

## 2021-04-07 RX ADMIN — CEFAZOLIN SODIUM 2000 MG: 2 SOLUTION INTRAVENOUS at 18:15

## 2021-04-07 RX ADMIN — HYDROMORPHONE HYDROCHLORIDE 0.5 MG: 1 INJECTION, SOLUTION INTRAMUSCULAR; INTRAVENOUS; SUBCUTANEOUS at 23:09

## 2021-04-07 RX ADMIN — INSULIN LISPRO 4 UNITS: 100 INJECTION, SOLUTION INTRAVENOUS; SUBCUTANEOUS at 12:08

## 2021-04-07 RX ADMIN — BUDESONIDE 0.5 MG: 0.5 INHALANT ORAL at 08:27

## 2021-04-07 RX ADMIN — LEVALBUTEROL HYDROCHLORIDE 1.25 MG: 1.25 SOLUTION, CONCENTRATE RESPIRATORY (INHALATION) at 19:57

## 2021-04-07 RX ADMIN — HYDROMORPHONE HYDROCHLORIDE 1 MG: 1 INJECTION, SOLUTION INTRAMUSCULAR; INTRAVENOUS; SUBCUTANEOUS at 15:58

## 2021-04-07 RX ADMIN — SODIUM CHLORIDE 0.5 MCG/KG/HR: 9 INJECTION, SOLUTION INTRAVENOUS at 07:30

## 2021-04-07 RX ADMIN — IPRATROPIUM BROMIDE 0.5 MG: 0.5 SOLUTION RESPIRATORY (INHALATION) at 14:36

## 2021-04-07 RX ADMIN — INSULIN LISPRO 4 UNITS: 100 INJECTION, SOLUTION INTRAVENOUS; SUBCUTANEOUS at 05:55

## 2021-04-07 RX ADMIN — TICAGRELOR 90 MG: 90 TABLET ORAL at 08:03

## 2021-04-07 RX ADMIN — SODIUM CHLORIDE, SODIUM GLUCONATE, SODIUM ACETATE, POTASSIUM CHLORIDE, MAGNESIUM CHLORIDE, SODIUM PHOSPHATE, DIBASIC, AND POTASSIUM PHOSPHATE 100 ML/HR: .53; .5; .37; .037; .03; .012; .00082 INJECTION, SOLUTION INTRAVENOUS at 05:54

## 2021-04-07 RX ADMIN — HEPARIN SODIUM 7500 UNITS: 5000 INJECTION INTRAVENOUS; SUBCUTANEOUS at 13:40

## 2021-04-07 RX ADMIN — IPRATROPIUM BROMIDE 0.5 MG: 0.5 SOLUTION RESPIRATORY (INHALATION) at 08:27

## 2021-04-07 RX ADMIN — TICAGRELOR 90 MG: 90 TABLET ORAL at 22:00

## 2021-04-07 RX ADMIN — IPRATROPIUM BROMIDE 0.5 MG: 0.5 SOLUTION RESPIRATORY (INHALATION) at 19:57

## 2021-04-07 RX ADMIN — FENTANYL CITRATE 50 MCG: 50 INJECTION INTRAMUSCULAR; INTRAVENOUS at 13:40

## 2021-04-07 RX ADMIN — FENTANYL CITRATE 50 MCG: 50 INJECTION INTRAMUSCULAR; INTRAVENOUS at 01:36

## 2021-04-07 RX ADMIN — IPRATROPIUM BROMIDE 0.5 MG: 0.5 SOLUTION RESPIRATORY (INHALATION) at 01:26

## 2021-04-07 RX ADMIN — HYDROMORPHONE HYDROCHLORIDE 0.5 MG: 1 INJECTION, SOLUTION INTRAMUSCULAR; INTRAVENOUS; SUBCUTANEOUS at 19:47

## 2021-04-07 RX ADMIN — ASPIRIN 81 MG: 81 TABLET, CHEWABLE ORAL at 08:03

## 2021-04-07 RX ADMIN — FENTANYL CITRATE 50 MCG: 50 INJECTION INTRAMUSCULAR; INTRAVENOUS at 04:16

## 2021-04-07 RX ADMIN — LEVALBUTEROL HYDROCHLORIDE 1.25 MG: 1.25 SOLUTION, CONCENTRATE RESPIRATORY (INHALATION) at 01:26

## 2021-04-07 RX ADMIN — SODIUM CHLORIDE 0.7 MCG/KG/HR: 9 INJECTION, SOLUTION INTRAVENOUS at 03:23

## 2021-04-07 RX ADMIN — CLINDAMYCIN IN 5 PERCENT DEXTROSE 600 MG: 12 INJECTION, SOLUTION INTRAVENOUS at 08:04

## 2021-04-07 RX ADMIN — Medication 100 MCG/HR: at 05:54

## 2021-04-07 RX ADMIN — OXYCODONE HYDROCHLORIDE 10 MG: 10 TABLET ORAL at 14:54

## 2021-04-07 RX ADMIN — CHLORHEXIDINE GLUCONATE 0.12% ORAL RINSE 15 ML: 1.2 LIQUID ORAL at 08:00

## 2021-04-07 RX ADMIN — ATORVASTATIN CALCIUM 80 MG: 80 TABLET, FILM COATED ORAL at 16:41

## 2021-04-07 RX ADMIN — LEVALBUTEROL HYDROCHLORIDE 1.25 MG: 1.25 SOLUTION, CONCENTRATE RESPIRATORY (INHALATION) at 08:27

## 2021-04-07 RX ADMIN — OXYCODONE HYDROCHLORIDE 10 MG: 10 TABLET ORAL at 22:27

## 2021-04-07 RX ADMIN — HEPARIN SODIUM 7500 UNITS: 5000 INJECTION INTRAVENOUS; SUBCUTANEOUS at 22:28

## 2021-04-07 RX ADMIN — HEPARIN SODIUM 7500 UNITS: 5000 INJECTION INTRAVENOUS; SUBCUTANEOUS at 05:55

## 2021-04-07 RX ADMIN — CEFAZOLIN SODIUM 2000 MG: 2 SOLUTION INTRAVENOUS at 12:01

## 2021-04-07 RX ADMIN — LEVALBUTEROL HYDROCHLORIDE 1.25 MG: 1.25 SOLUTION, CONCENTRATE RESPIRATORY (INHALATION) at 14:36

## 2021-04-07 RX ADMIN — OXYCODONE HYDROCHLORIDE 10 MG: 10 TABLET ORAL at 18:12

## 2021-04-07 RX ADMIN — CHLORHEXIDINE GLUCONATE 0.12% ORAL RINSE 15 ML: 1.2 LIQUID ORAL at 22:00

## 2021-04-07 RX ADMIN — BUDESONIDE 0.5 MG: 0.5 INHALANT ORAL at 19:57

## 2021-04-07 NOTE — PLAN OF CARE
Problem: Prexisting or High Potential for Compromised Skin Integrity  Goal: Skin integrity is maintained or improved  Description: INTERVENTIONS:  - Identify patients at risk for skin breakdown  - Assess and monitor skin integrity  - Assess and monitor nutrition and hydration status  - Monitor labs   - Assess for incontinence   - Turn and reposition patient  - Assist with mobility/ambulation  - Relieve pressure over bony prominences  - Avoid friction and shearing  - Provide appropriate hygiene as needed including keeping skin clean and dry  - Evaluate need for skin moisturizer/barrier cream  - Collaborate with interdisciplinary team   - Patient/family teaching  - Consider wound care consult   Outcome: Progressing     Problem: SAFETY,RESTRAINT: NV/NON-SELF DESTRUCTIVE BEHAVIOR  Goal: Remains free of harm/injury (restraint for non violent/non self-detsructive behavior)  Description: INTERVENTIONS:  - Instruct patient/family regarding restraint use   - Assess and monitor physiologic and psychological status   - Provide interventions and comfort measures to meet assessed patient needs   - Identify and implement measures to help patient regain control  - Assess readiness for release of restraint   Outcome: Progressing  Goal: Returns to optimal restraint-free functioning  Description: INTERVENTIONS:  - Assess the patient's behavior and symptoms that indicate continued need for restraint  - Identify and implement measures to help patient regain control  - Assess readiness for release of restraint   Outcome: Progressing     Problem: PAIN - ADULT  Goal: Verbalizes/displays adequate comfort level or baseline comfort level  Description: Interventions:  - Encourage patient to monitor pain and request assistance  - Assess pain using appropriate pain scale  - Administer analgesics based on type and severity of pain and evaluate response  - Implement non-pharmacological measures as appropriate and evaluate response  - Consider cultural and social influences on pain and pain management  - Notify physician/advanced practitioner if interventions unsuccessful or patient reports new pain  Outcome: Progressing     Problem: INFECTION - ADULT  Goal: Absence or prevention of progression during hospitalization  Description: INTERVENTIONS:  - Assess and monitor for signs and symptoms of infection  - Monitor lab/diagnostic results  - Monitor all insertion sites, i e  indwelling lines, tubes, and drains  - Monitor endotracheal if appropriate and nasal secretions for changes in amount and color  - [place] appropriate cooling/warming therapies per order  - Administer medications as ordered  - Instruct and encourage patient and family to use good hand hygiene technique  - Identify and instruct in appropriate isolation precautions for identified infection/condition  Outcome: Progressing     Problem: SAFETY ADULT  Goal: Patient will remain free of falls  Description: INTERVENTIONS:  - Assess patient frequently for physical needs  -  Identify cognitive and physical deficits and behaviors that affect risk of falls    -  [place] fall precautions as indicated by assessment   - Educate patient/family on patient safety including physical limitations  - Instruct patient to call for assistance with activity based on assessment  - Modify environment to reduce risk of injury  - Consider OT/PT consult to assist with strengthening/mobility  Outcome: Progressing  Goal: Maintain or return to baseline ADL function  Description: INTERVENTIONS:  -  Assess patient's ability to carry out ADLs; assess patient's baseline for ADL function and identify physical deficits which impact ability to perform ADLs (bathing, care of mouth/teeth, toileting, grooming, dressing, etc )  - Assess/evaluate cause of self-care deficits   - Assess range of motion  - Assess patient's mobility; develop plan if impaired  - Assess patient's need for assistive devices and provide as appropriate  - Encourage maximum independence but intervene and supervise when necessary  - Involve family in performance of ADLs  - Assess for home care needs following discharge   - Consider OT consult to assist with ADL evaluation and planning for discharge  - Provide patient education as appropriate  Outcome: Progressing  Goal: Maintain or return mobility status to optimal level  Description: INTERVENTIONS:  - Assess patient's baseline mobility status (ambulation, transfers, stairs, etc )    - Identify cognitive and physical deficits and behaviors that affect mobility  - Identify mobility aids required to assist with transfers and/or ambulation (gait belt, sit-to-stand, lift, walker, cane, etc )  - [place] fall precautions as indicated by assessment  - Record patient progress and toleration of activity level on Mobility SBAR; progress patient to next Phase/Stage  - Instruct patient to call for assistance with activity based on assessment  - Consider rehabilitation consult to assist with strengthening/weightbearing, etc   Outcome: Progressing     Problem: DISCHARGE PLANNING  Goal: Discharge to home or other facility with appropriate resources  Description: INTERVENTIONS:  - Identify barriers to discharge w/patient and caregiver  - Arrange for needed discharge resources and transportation as appropriate  - Identify discharge learning needs (meds, wound care, etc )  - Arrange for interpretive services to assist at discharge as needed  - Refer to Case Management Department for coordinating discharge planning if the patient needs post-hospital services based on physician/advanced practitioner order or complex needs related to functional status, cognitive ability, or social support system  Outcome: Progressing     Problem: Knowledge Deficit  Goal: Patient/family/caregiver demonstrates understanding of disease process, treatment plan, medications, and discharge instructions  Description: Complete learning assessment and assess knowledge base    Interventions:  - Provide teaching at level of understanding  - Provide teaching via preferred learning methods  Outcome: Progressing     Problem: CARDIOVASCULAR - ADULT  Goal: Maintains optimal cardiac output and hemodynamic stability  Description: INTERVENTIONS:  - Monitor I/O, vital signs and rhythm  - Monitor for S/S and trends of decreased cardiac output  - Administer and titrate ordered vasoactive medications to optimize hemodynamic stability  - Assess quality of pulses, skin color and temperature  - Assess for signs of decreased coronary artery perfusion  - Instruct patient to report change in severity of symptoms  Outcome: Progressing  Goal: Absence of cardiac dysrhythmias or at baseline rhythm  Description: INTERVENTIONS:  - Continuous cardiac monitoring, vital signs, obtain 12 lead EKG if ordered  - Administer antiarrhythmic and heart rate control medications as ordered  - Monitor electrolytes and administer replacement therapy as ordered  Outcome: Progressing     Problem: RESPIRATORY - ADULT  Goal: Achieves optimal ventilation and oxygenation  Description: INTERVENTIONS:  - Assess for changes in respiratory status  - Assess for changes in mentation and behavior  - Position to facilitate oxygenation and minimize respiratory effort  - Oxygen administered by appropriate delivery if ordered  - Initiate smoking cessation education as indicated  - Encourage broncho-pulmonary hygiene including cough, deep breathe, Incentive Spirometry  - Assess the need for suctioning and aspirate as needed  - Assess and instruct to report SOB or any respiratory difficulty  - Respiratory Therapy support as indicated  Outcome: Progressing

## 2021-04-07 NOTE — PROGRESS NOTES
Progress Note - General Surgery   Josefa Mitchell 45 y o  male MRN: [id number]  Unit/Bed#: [facility] 515-01 Encounter: [id number]    Assessment/Plan:  45 y o  male with left thigh soft tissue mass s/p excision and vac placement (4/6)     Wean to extubate  Diet once extubated  VAC dressing change tomorrow at bedside  Electrolytes per critical care  Appreciate critical care management    Subjective/Objective     Subjective: No acute events  Doing well this AM       Objective:     Vitals: Temp:  [97 9 °F (36 6 °C)-99 2 °F (37 3 °C)] 99 °F (37 2 °C)  HR:  [] 58  Resp:  [5-34] 17  BP: ()/() 101/53  Body mass index is 57 68 kg/m²  I/O       04/05 0701 - 04/06 0700 04/06 0701 - 04/07 0700 04/07 0701 - 04/08 0700    P  O   0 0    I V  (mL/kg)  4812 7 (29) 271 7 (1 6)    NG/GT  60 50    IV Piggyback  1350     Total Intake(mL/kg)  6222 7 (37 5) 321 7 (1 9)    Urine (mL/kg/hr)  855 (0 2) 140 (0 6)    Emesis/NG output  250 50    Drains  125 70    Stool  0 0    Blood  500     Total Output  1730 260    Net  +4492 7 +61 7           Unmeasured Stool Occurrence  0 x 0 x          Physical Exam:  GEN: NAD  HEENT: MMM, endotracheal tube in place  CV:  Warm/well perfused  Lung:  Mechanically ventilated  Ab: Soft, NT/ND  Extrem:  Left thigh VAC dressing with good seal and serosanguineous drainage canister  Neuro:  Awake/alert, answering questions nonverbally and following commands    Lab, Imaging and other studies:   CBC with diff:   Lab Results   Component Value Date    WBC 13 14 (H) 04/07/2021    HGB 9 5 (L) 04/07/2021    HCT 31 5 (L) 04/07/2021    MCV 91 04/07/2021     04/07/2021    MCH 27 5 04/07/2021    MCHC 30 2 (L) 04/07/2021    RDW 13 9 04/07/2021    MPV 9 8 04/07/2021    NRBC 0 04/07/2021   , BMP/CMP:   Lab Results   Component Value Date    SODIUM 138 04/07/2021    K 5 7 (H) 04/07/2021     (H) 04/07/2021    CO2 23 04/07/2021    BUN 35 (H) 04/07/2021    CREATININE 1 91 (H) 04/07/2021    CALCIUM 7 0 (L) 04/07/2021    AST 27 04/07/2021    ALT 15 04/07/2021    ALKPHOS 63 04/07/2021    EGFR 43 04/07/2021   , Magnesium: No components found for: MAG, Coags: No results found for: PT, PTT, INR  VTE Pharmacologic Prophylaxis: Heparin  VTE Mechanical Prophylaxis: sequential compression device

## 2021-04-07 NOTE — UTILIZATION REVIEW
Initial Clinical Review    Elective INPT surgical procedure  Age/Sex: 45 y o  male  Surgery Date: 4/6/2021  Procedure: EXCISION THIGH MASS (Left)  APPLICATION VAC DRESSING THIGH (Left)  Anesthesia:General  Operative Findings: Large soft tissue mass of left medial thigh associated with patient's known hidradenitis  Some spontaneous purulent discharge appreciated following positioning  Numerous large veins associated with mass     POD#1 Progress Note:   Assessment/Plan:  45 y o  male with left thigh soft tissue mass s/p excision and vac placement (4/6)      Wean to extubate  Diet once extubated  VAC dressing change tomorrow at bedside  Electrolytes per critical care  Appreciate critical care management      Admission Orders: Date/Time/Statement:   Admission Orders (From admission, onward)     Ordered        04/06/21 1348  Inpatient Admission  Once                   Orders Placed This Encounter   Procedures    Inpatient Admission     Standing Status:   Standing     Number of Occurrences:   1     Order Specific Question:   Level of Care     Answer:   Critical Care [15]     Order Specific Question:   Estimated length of stay     Answer:   More than 2 Midnights     Order Specific Question:   Certification     Answer:   I certify that inpatient services are medically necessary for this patient for a duration of greater than two midnights  See H&P and MD Progress Notes for additional information about the patient's course of treatment       Vital Signs: /54   Pulse 56   Temp 99 °F (37 2 °C) (Oral)   Resp (!) 23   Ht 5' 6 75" (1 695 m)   Wt (!) 166 kg (365 lb 8 4 oz)   SpO2 94%   BMI 57 68 kg/m²   Diet: NPO  Mobility: ELEVATE HOB  DVT Prophylaxis: SCD  Medications/Pain Control:   Scheduled Medications:  aspirin, 81 mg, Per NG Tube, Daily  atorvastatin, 80 mg, Per NG Tube, Daily With Dinner  budesonide, 0 5 mg, Nebulization, BID  chlorhexidine, 15 mL, Swish & Spit, Q12H [facility]  clindamycin, 600 mg, Intravenous, Q8H  heparin (porcine), 7,500 Units, Subcutaneous, Q8H [address]  insulin lispro, 4-20 Units, Subcutaneous, Q6H [facility]  ipratropium, 0 5 mg, Nebulization, Q6H  levalbuterol, 1 25 mg, Nebulization, Q6H  midazolam, 2 mg, Intravenous, Once  ticagrelor, 90 mg, Per NG Tube, Q12H [address]      Continuous IV Infusions:  dexmedetomidine, 0 1-0 7 mcg/kg/hr, Intravenous, Titrated  fentaNYL, 100 mcg/hr, Intravenous, Continuous  multi-electrolyte, 100 mL/hr, Intravenous, Continuous      PRN Meds:  fentanyl citrate (PF), 50 mcg, Intravenous, Q1H PRN 4/6 X 4, 4/7 X 2  HYDROmorphone, 0 2 mg, Intravenous, Q5 Min PRN    CARDIO-PULM MONITORING  CAM      Network Utilization Review Department  ATTENTION: Please call with any questions or concerns to [phone] and carefully listen to the prompts so that you are directed to the right person  All voicemails are confidential   Verona Pugh all requests for admission clinical reviews, approved or denied determinations and any other requests to dedicated fax number below belonging to the campus where the patient is receiving treatment   List of dedicated fax numbers for the Facilities:  [address] DENIALS (Administrative/Medical Necessity) [phone]   [address] (Maternity/NICU/Pediatrics) [phone]   [address] [place] [address] (Vonda Padilla) [address] [place] [address] [address] [place] [address] [phone]

## 2021-04-07 NOTE — RESPIRATORY THERAPY NOTE
RT Ventilator Management Note  Charma Mode 45 y o  male MRN: [id number]  Unit/Bed#: [facility] 152-33 Encounter: [id number]      Daily Screen       4/7/2021  0003 4/7/2021  0415          Patient safety screen outcome[de-identified]  Failed  Failed      Not Ready for Weaning due to[de-identified]  FiO2 >60%;PEEP > 8cmH2O;Underline problem not resolved  FiO2 >60%;PEEP > 8cmH2O;Underline problem not resolved              Physical Exam:   Assessment Type: Pre-treatment  General Appearance: Sedated  Respiratory Pattern: Assisted  Chest Assessment: Chest expansion symmetrical  Bilateral Breath Sounds: Diminished  Cough: Productive, Strong  Suction: ET Tube  O2 Device:       Resp Comments: Pt received on vent in PC mode, appearing comfortable on current settings  BBS decreased with small amts thin whitish secretions with suction  No adverse reactions noted  Fio2 decreased by PA-C for weaning in AM to extubation; tolerating well  Will continue to monitor

## 2021-04-07 NOTE — PROGRESS NOTES
Fentanyl pulled out under wrong patient  1 mL of fentanyl wasted in accu-dose by this nurse with Katie Lopez RN

## 2021-04-07 NOTE — UTILIZATION REVIEW
Notification of Inpatient Admission/Inpatient Authorization Request   This is a Notification of Inpatient Admission for [facility]  Be advised that this patient was admitted to our facility under Inpatient Status  Contact All Castro at [phone] for additional admission information  [facility] DEPT  DEDICATED -[phone]  Patient Name:   Harpal Fang   YOB: 1980       [address]   [address]:   [facility]  Tax ID: [id number]  NPI: [id number] Attending Provider/NPI:  Phone:  Address: [place], [place] [[id number]]  [phone]  Same as Stephie Rodriguez [address] of Service Code: 24 Place of Service Name:  [address] [place] [address]   Start Date: 4/6/21 1348 Discharge Date & Time: No discharge date for patient encounter  Type of Admission: Inpatient Status Discharge Disposition (if discharged): Home/Self Care   Patient Diagnoses: Skin lesion of left leg [L98 9]     Orders: Admission Orders (From admission, onward)     Ordered        04/06/21 1348  Inpatient Admission  Once                    Assigned Utilization Review Contact: All Castro  Utilization ,   Network Utilization Review Department  Phone: [phone]; Fax [phone]   Email: [email or web address]   ATTENTION PAYERS: Please call the assigned Utilization  directly with any questions or concerns ALL voicemails in the department are confidential  Send all requests for admission clinical reviews, approved or denied determinations and any other requests to dedicated fax number belonging to the campus where the patient is receiving treatment

## 2021-04-07 NOTE — PROGRESS NOTES
Daily Progress Note - [address] [address] C Marlys 45 y o  male MRN: [id number]  Unit/Bed#: [facility] 515-01 Encounter: [id number]        ----------------------------------------------------------------------------------------  HPI/24hr events: Patient POD #1 excision of L thigh mass, VAC dressing placement   According to OR staff, he was a difficult intubation and was hypoxic throughout most of the case  Following the OR case, he was admitted to the ICU on mechanical ventilation   He was started on nebs  He was briefly on APRV and transitioned to AC/PC    ---------------------------------------------------------------------------------------  SUBJECTIVE  Patient denies pain  He wants his ETT removed     Review of Systems  Review of systems was unable to be performed secondary to mechanical ventilation   ---------------------------------------------------------------------------------------  Assessment and Plan:    Neuro:    Diagnosis: Post operative sedation  o Plan: Continue precedex  o Will plan to wean    Diagnosis: Post operative pain   o Plan: Continue fentanyl gtt  o PRN fentanyl   o Will attempt to liberate from th vent and wean off IV   o Can start oxy, tylenol via OGT       CV:    Diagnosis: CAD s/p MI, PCI 12/20  o Plan: Continue DAPT  o Bedside US showed normal function, no evidence of volume overload   o Continue to monitor on telemetry    Diagnosis: Intermittent hypotension likely secondary to sedation and hypovolemia   o Plan: Continue with volume resuscitation    Diagnosis: HTN  o Plan: Holding home coreg and norvasc    Diagnosis: HLD   o Plan: Continue Statin       Pulm:   Diagnosis: Acute hypoxic respiratory failure following surgery likely secondary underlying lung disease and body habitus   o Plan: Plan to wean this AM with attempt to liberate from the vent  o May need to extubate to Bipap   o Continue Q8 xopenex, atrovent, and pulmicort   o Patient not actively wheezing   Hold on steroids at this time     GI:    Diagnosis: No acute issues  o Plan: Continue OGT      :    Diagnosis: CLOVIS likely secondary to hypovolemia   o Plan: Continue to trend creatine   o Continue with IVF   o Continue lewis until extubated       F/E/N:    Plan: NPO   Hyperkalemia 5 7 this AM  No EKG changes   Continue IVF   Trend electrolytes and replete prn       Heme/Onc:    Diagnosis: No acute issues  o Plan: Trend CBC  o Hgb 9 5      Endo:    Diagnosis: DM  o Plan: Continue ISS   o Goal -180      ID:    Diagnosis: L thigh mass, hydradenitis   o Plan: POD #1 excision   o Change to IV Ancef      MSK/Skin:    Diagnosis: L thigh wound s/p mass excision   o Plan: Continue wound VAC  o Local wound care      Disposition: Continue Critical Care   Code Status: Level 1 - Full Code  ---------------------------------------------------------------------------------------  ICU CORE MEASURES    Prophylaxis   VTE Pharmacologic Prophylaxis: Heparin  VTE Mechanical Prophylaxis: sequential compression device  Stress Ulcer Prophylaxis: Prophylaxis Not Indicated     ABCDE Protocol (if indicated)  Plan to perform spontaneous awakening trial today? Yes  Plan to perform spontaneous breathing trial today? Yes  Obvious barriers to extubation? No  CAM-ICU: Negative    Invasive Devices Review  Invasive Devices     Peripheral Intravenous Line            Peripheral IV 04/06/21 Distal;Right;Upper;Ventral (anterior) Arm less than 1 day    Peripheral IV 04/06/21 Left Hand less than 1 day    Peripheral IV 04/06/21 Right;Ventral (anterior) Forearm less than 1 day          Drain            NG/OG/Enteral Tube Orogastric 18 Fr less than 1 day    Negative Pressure Wound Therapy (V A C ) Other (Comment) less than 1 day    Urethral Catheter Latex 16 Fr  less than 1 day          Airway            ETT  Cuffed;Oral 8 mm less than 1 day              Can any invasive devices be discontinued today? Yes  ---------------------------------------------------------------------------------------  OBJECTIVE    Vitals   Vitals:    21 0500 21 0559 21 0600 21 0700   BP: (!) 97/48  102/51 101/53   Pulse: 60  56 58   Resp:   17 17   Temp: 99 °F (37 2 °C)      TempSrc: Oral      SpO2: 96%  96% 96%   Weight:  (!) 166 kg (365 lb 8 4 oz)     Height:         Temp (24hrs), Av 7 °F (37 1 °C), Min:97 9 °F (36 6 °C), Max:99 2 °F (37 3 °C)  Current: Temperature: 99 °F (37 2 °C)      Respiratory:  SpO2: SpO2: 96 %, SpO2 Activity:  , SpO2 Device: O2 Device: Ventilator       Invasive/non-invasive ventilation settings   Respiratory    Lab Data (Last 4 hours)    None         O2/Vent Data (Last 4 hours)       0415           Vent Mode AC/PC       Patient safety screen outcome: Failed       Resp Rate (BPM) (BPM) 15       Pressure Control (cmH2O) (cm) 15       Insp Time (sec) (sec) 0 65       FiO2 (%) (%) 60       PEEP (cmH2O) (cmH2O) 16       MV 11 7                   Physical Exam  Vitals signs reviewed  Constitutional:       Comments: Young obese male lying in bed on mechanical ventilation    HENT:      Mouth/Throat:      Mouth: Mucous membranes are moist    Eyes:      Pupils: Pupils are equal, round, and reactive to light  Neck:      Musculoskeletal: Neck supple  Cardiovascular:      Rate and Rhythm: Normal rate  Pulmonary:      Comments: Intubated on PSV this AM   Abdominal:      Palpations: Abdomen is soft  Comments: Obese   Genitourinary:     Comments: Robbins in place with dark urine  Musculoskeletal:      Comments: L thigh wound with VAC in place   Skin:     General: Skin is warm  Capillary Refill: Capillary refill takes less than 2 seconds     Neurological:      Comments: Sedated on precedex and fentanyl  Opens eyes  Follows commands  Shakes head "yes' and "no" appropriately          Laboratory and Diagnostics:  Results from last 7 days   Lab Units 21  0625 04/06/21  1819 04/06/21  1152   WBC Thousand/uL 13 14* 19 22* 15 01*   HEMOGLOBIN g/dL 9 5* 10 8* 12 5   HEMATOCRIT % 31 5* 33 8* 40 2   PLATELETS Thousands/uL 320 388 392*   NEUTROS PCT % 81*  --  88*   MONOS PCT % 8  --  2*     Results from last 7 days   Lab Units 04/07/21  0514 04/06/21 1819 04/06/21  1153   SODIUM mmol/L 138 132* 134*   POTASSIUM mmol/L 5 7* 6 7* 5 9*   CHLORIDE mmol/L 109* 103 104   CO2 mmol/L 23 23 23   ANION GAP mmol/L 6 6 7   BUN mg/dL 35* 30* 25   CREATININE mg/dL 1 91* 1 82* 1 47*   CALCIUM mg/dL 7 0* 8 0* 8 8   GLUCOSE RANDOM mg/dL 147* 225* 243*   ALT U/L 15  --   --    AST U/L 27  --   --    ALK PHOS U/L 63  --   --    ALBUMIN g/dL 2 1*  --   --    TOTAL BILIRUBIN mg/dL 0 47  --   --      Results from last 7 days   Lab Units 04/07/21  0514   MAGNESIUM mg/dL 1 9   PHOSPHORUS mg/dL 4 7*           Results from last 7 days   Lab Units 04/06/21  1339   TROPONIN I ng/mL <0 02     Results from last 7 days   Lab Units 04/06/21 2208 04/06/21  1152   LACTIC ACID mmol/L 1 6 1 5     ABG:  Results from last 7 days   Lab Units 04/06/21  1810   PH ART  7 357   PCO2 ART mm Hg 44 4*   PO2 ART mm Hg 76 4   HCO3 ART mmol/L 24 4   BASE EXC ART mmol/L -1 3   ABG SOURCE  Radial, Left     VBG:  Results from last 7 days   Lab Units 04/06/21 2208 04/06/21  1810   PH MAGUE  7 300  --    PCO2 MAGUE mm Hg 55 2*  --    PO2 MAGUE mm Hg 53 6*  --    HCO3 MAGUE mmol/L 26 6  --    BASE EXC MAGUE mmol/L -0 5  --    ABG SOURCE   --  Radial, Left           Micro        EKG: SR  Imaging:  I have personally reviewed pertinent reports  and I have personally reviewed pertinent films in PACS    Intake and Output  I/O       04/05 0701 - 04/06 0700 04/06 0701 - 04/07 0700 04/07 0701 - 04/08 0700    P  O   0     I V  (mL/kg)  4812 7 (29)     NG/GT  60     IV Piggyback  1350     Total Intake(mL/kg)  6222 7 (37 5)     Urine (mL/kg/hr)  855 (0 2)     Emesis/NG output  250     Drains  125     Stool  0     Blood  500     Total Output  1730 Net  +4492 7            Unmeasured Stool Occurrence  0 x           Height and Weights   Height: 5' 6 75" (169 5 cm)  IBW: 65 53 kg  Body mass index is 57 68 kg/m²  Weight (last 2 days)     Date/Time   Weight    04/07/21 0559   (!) 166 (365 52)    04/06/21 0634   (!) 168 (370)                Nutrition       Diet Orders   (From admission, onward)             Start     Ordered    04/06/21 1348  Diet NPO; Sips with meds  Diet effective now     Question Answer Comment   Diet Type NPO    NPO Except: Sips with meds    RD to adjust diet per protocol?  Yes        04/06/21 1348                  Active Medications  Scheduled Meds:  Current Facility-Administered Medications   Medication Dose Route Frequency Provider Last Rate    aspirin  81 mg Per NG Tube Daily Deisy Luo MD      atorvastatin  80 mg Per NG Tube Daily With Kamari Ortiz MD      budesonide  0 5 mg Nebulization BID Jonas Santos MD      chlorhexidine  15 mL Swish & Spit Q12H [address] Marily Batista MD      clindamycin  600 mg Intravenous Q8H Ozzie Reynoso PA-C Stopped (04/07/21 0000)    dexmedetomidine  0 1-0 7 mcg/kg/hr Intravenous Titrated Deisy Luo MD 0 5 mcg/kg/hr (04/07/21 0709)    fentaNYL  100 mcg/hr Intravenous Continuous Deisy Luo  mcg/hr (04/07/21 0554)    fentanyl citrate (PF)  50 mcg Intravenous Q1H PRN Ozzie Reynoso PA-C      heparin (porcine)  7,500 Units Subcutaneous [facility] Marily Batista MD      HYDROmorphone  0 2 mg Intravenous Q5 Min PRN Dylon Dominguez MD      insulin lispro  4-20 Units Subcutaneous Q6H [address] Destinee Castanon, DO      ipratropium  0 5 mg Nebulization Q6H Jonas Santos MD      levalbuterol  1 25 mg Nebulization Q6H Jonas Santos MD      midazolam  2 mg Intravenous Once Urban Myers, DO      multi-electrolyte  100 mL/hr Intravenous Continuous Gloria Frisk,  mL/hr (04/07/21 0554)    ticagrelor  90 mg Per NG Tube Q12H [address] Deisy Luo MD       Continuous Infusions:  dexmedetomidine, 0 1-0 7 mcg/kg/hr, Last Rate: 0 5 mcg/kg/hr (04/07/21 0709)  fentaNYL, 100 mcg/hr, Last Rate: 100 mcg/hr (04/07/21 0554)  multi-electrolyte, 100 mL/hr, Last Rate: 100 mL/hr (04/07/21 0554)      PRN Meds:   fentanyl citrate (PF), 50 mcg, Q1H PRN  HYDROmorphone, 0 2 mg, Q5 Min PRN        Allergies   Allergies   Allergen Reactions    Amoxicillin Hives     ---------------------------------------------------------------------------------------  Advance Directive and Living Will:      Power of :    POLST:    ---------------------------------------------------------------------------------------  Care Time Delivered:   Upon my evaluation, this patient had a high probability of imminent or life-threatening deterioration due to respiratory failure, which required my direct attention, intervention, and personal management  I have personally provided 30 minutes (1000 to 1030) of critical care time, exclusive of procedures, teaching, family meetings, and any prior time recorded by providers other than myself  Guillermo Beth PA-C      Portions of the record may have been created with voice recognition software  Occasional wrong word or "sound a like" substitutions may have occurred due to the inherent limitations of voice recognition software    Read the chart carefully and recognize, using context, where substitutions have occurred

## 2021-04-07 NOTE — RESPIRATORY THERAPY NOTE
RT Ventilator Management Note  Audra Barron 45 y o  male MRN: [id number]  Unit/Bed#: [facility] 571-68 Encounter: [id number]      Daily Screen       4/7/2021  1038 4/7/2021  1045          Patient safety screen outcome[de-identified]  Passed  Passed      Spont breathing trial % for 30 min:  Yes  Yes      Spont breathing trial outcome[de-identified]  --  Passed      Name of Medical Team Notified[de-identified]  --  [facility]      Preparing to extubate/ Notify Nurse:  --  Yes      Extubation order obtained:  --  Yes      Consider Cuff Test:  --  Yes      Patient extubated:  --  Yes      RSBI:  46  44              Physical Exam:   Assessment Type: Assess only  General Appearance: Alert, Awake  Respiratory Pattern: Spontaneous  Chest Assessment: Chest expansion symmetrical  Bilateral Breath Sounds: Diminished  R Breath Sounds: Diminished  L Breath Sounds: Diminished  Suction: ET Tube  O2 Device: BiPAP      Resp Comments: Pt extubated at this and placed on BiPAP 15/10 100% w/SpO2 91%  Pt able to vocalize post extubation, no stridor or resp distress

## 2021-04-07 NOTE — CASE MANAGEMENT
Attempted to meet with patient twice today but he was receiving care  Will speak to patient tomorrow

## 2021-04-08 LAB
ANION GAP SERPL CALCULATED.3IONS-SCNC: 5 MMOL/L (ref 4–13)
BASOPHILS # BLD AUTO: 0.04 THOUSANDS/ΜL (ref 0–0.1)
BASOPHILS NFR BLD AUTO: 0 % (ref 0–1)
BUN SERPL-MCNC: 18 MG/DL (ref 5–25)
CALCIUM SERPL-MCNC: 8.3 MG/DL (ref 8.3–10.1)
CHLORIDE SERPL-SCNC: 102 MMOL/L (ref 100–108)
CO2 SERPL-SCNC: 29 MMOL/L (ref 21–32)
CREAT SERPL-MCNC: 1.12 MG/DL (ref 0.6–1.3)
EOSINOPHIL # BLD AUTO: 0.12 THOUSAND/ΜL (ref 0–0.61)
EOSINOPHIL NFR BLD AUTO: 1 % (ref 0–6)
ERYTHROCYTE [DISTWIDTH] IN BLOOD BY AUTOMATED COUNT: 13.8 % (ref 11.6–15.1)
GFR SERPL CREATININE-BSD FRML MDRD: 82 ML/MIN/1.73SQ M
GLUCOSE SERPL-MCNC: 126 MG/DL (ref 65–140)
GLUCOSE SERPL-MCNC: 142 MG/DL (ref 65–140)
GLUCOSE SERPL-MCNC: 148 MG/DL (ref 65–140)
GLUCOSE SERPL-MCNC: 153 MG/DL (ref 65–140)
GLUCOSE SERPL-MCNC: 170 MG/DL (ref 65–140)
HCT VFR BLD AUTO: 32.3 % (ref 36.5–49.3)
HGB BLD-MCNC: 10.2 G/DL (ref 12–17)
IMM GRANULOCYTES # BLD AUTO: 0.07 THOUSAND/UL (ref 0–0.2)
IMM GRANULOCYTES NFR BLD AUTO: 1 % (ref 0–2)
LYMPHOCYTES # BLD AUTO: 1.42 THOUSANDS/ΜL (ref 0.6–4.47)
LYMPHOCYTES NFR BLD AUTO: 10 % (ref 14–44)
MAGNESIUM SERPL-MCNC: 2 MG/DL (ref 1.6–2.6)
MCH RBC QN AUTO: 28.5 PG (ref 26.8–34.3)
MCHC RBC AUTO-ENTMCNC: 31.6 G/DL (ref 31.4–37.4)
MCV RBC AUTO: 90 FL (ref 82–98)
MONOCYTES # BLD AUTO: 1.21 THOUSAND/ΜL (ref 0.17–1.22)
MONOCYTES NFR BLD AUTO: 9 % (ref 4–12)
NEUTROPHILS # BLD AUTO: 11.02 THOUSANDS/ΜL (ref 1.85–7.62)
NEUTS SEG NFR BLD AUTO: 79 % (ref 43–75)
NRBC BLD AUTO-RTO: 0 /100 WBCS
PHOSPHATE SERPL-MCNC: 2.9 MG/DL (ref 2.7–4.5)
PLATELET # BLD AUTO: 347 THOUSANDS/UL (ref 149–390)
PMV BLD AUTO: 9.2 FL (ref 8.9–12.7)
POTASSIUM SERPL-SCNC: 4.3 MMOL/L (ref 3.5–5.3)
RBC # BLD AUTO: 3.58 MILLION/UL (ref 3.88–5.62)
SODIUM SERPL-SCNC: 136 MMOL/L (ref 136–145)
WBC # BLD AUTO: 13.88 THOUSAND/UL (ref 4.31–10.16)

## 2021-04-08 PROCEDURE — 83735 ASSAY OF MAGNESIUM: CPT | Performed by: SURGERY

## 2021-04-08 PROCEDURE — 84100 ASSAY OF PHOSPHORUS: CPT | Performed by: SURGERY

## 2021-04-08 PROCEDURE — 85025 COMPLETE CBC W/AUTO DIFF WBC: CPT | Performed by: SURGERY

## 2021-04-08 PROCEDURE — 2W0MX6Z CHANGE PRESSURE DRESSING ON LEFT LOWER EXTREMITY: ICD-10-PCS | Performed by: SURGERY

## 2021-04-08 PROCEDURE — 82948 REAGENT STRIP/BLOOD GLUCOSE: CPT

## 2021-04-08 PROCEDURE — 94640 AIRWAY INHALATION TREATMENT: CPT

## 2021-04-08 PROCEDURE — NC001 PR NO CHARGE: Performed by: SURGERY

## 2021-04-08 PROCEDURE — 94760 N-INVAS EAR/PLS OXIMETRY 1: CPT

## 2021-04-08 PROCEDURE — 94664 DEMO&/EVAL PT USE INHALER: CPT

## 2021-04-08 PROCEDURE — 80048 BASIC METABOLIC PNL TOTAL CA: CPT | Performed by: SURGERY

## 2021-04-08 RX ORDER — ACETAMINOPHEN 325 MG/1
975 TABLET ORAL EVERY 8 HOURS SCHEDULED
Status: DISCONTINUED | OUTPATIENT
Start: 2021-04-08 | End: 2021-04-29 | Stop reason: HOSPADM

## 2021-04-08 RX ORDER — GABAPENTIN 100 MG/1
100 CAPSULE ORAL 3 TIMES DAILY
Status: DISCONTINUED | OUTPATIENT
Start: 2021-04-08 | End: 2021-04-16

## 2021-04-08 RX ORDER — FENTANYL CITRATE 50 UG/ML
50 INJECTION, SOLUTION INTRAMUSCULAR; INTRAVENOUS ONCE
Status: COMPLETED | OUTPATIENT
Start: 2021-04-08 | End: 2021-04-08

## 2021-04-08 RX ORDER — OXYCODONE HYDROCHLORIDE 10 MG/1
10 TABLET ORAL EVERY 4 HOURS PRN
Status: DISCONTINUED | OUTPATIENT
Start: 2021-04-08 | End: 2021-04-08

## 2021-04-08 RX ORDER — NALOXONE HYDROCHLORIDE 0.4 MG/ML
0.1 INJECTION, SOLUTION INTRAMUSCULAR; INTRAVENOUS; SUBCUTANEOUS
Status: DISCONTINUED | OUTPATIENT
Start: 2021-04-08 | End: 2021-04-29 | Stop reason: HOSPADM

## 2021-04-08 RX ORDER — MIDAZOLAM HYDROCHLORIDE 2 MG/2ML
INJECTION, SOLUTION INTRAMUSCULAR; INTRAVENOUS
Status: COMPLETED
Start: 2021-04-08 | End: 2021-04-08

## 2021-04-08 RX ORDER — FENTANYL CITRATE 50 UG/ML
INJECTION, SOLUTION INTRAMUSCULAR; INTRAVENOUS
Status: COMPLETED
Start: 2021-04-08 | End: 2021-04-08

## 2021-04-08 RX ORDER — OXYCODONE HYDROCHLORIDE 5 MG/1
5 TABLET ORAL EVERY 4 HOURS PRN
Status: DISCONTINUED | OUTPATIENT
Start: 2021-04-08 | End: 2021-04-08

## 2021-04-08 RX ORDER — HYDROMORPHONE HCL/PF 1 MG/ML
1 SYRINGE (ML) INJECTION
Status: DISCONTINUED | OUTPATIENT
Start: 2021-04-08 | End: 2021-04-16

## 2021-04-08 RX ORDER — FENTANYL CITRATE 50 UG/ML
100 INJECTION, SOLUTION INTRAMUSCULAR; INTRAVENOUS ONCE
Status: COMPLETED | OUTPATIENT
Start: 2021-04-08 | End: 2021-04-08

## 2021-04-08 RX ORDER — METHOCARBAMOL 750 MG/1
750 TABLET, FILM COATED ORAL EVERY 6 HOURS SCHEDULED
Status: DISCONTINUED | OUTPATIENT
Start: 2021-04-08 | End: 2021-04-29 | Stop reason: HOSPADM

## 2021-04-08 RX ORDER — MIDAZOLAM HYDROCHLORIDE 2 MG/2ML
2 INJECTION, SOLUTION INTRAMUSCULAR; INTRAVENOUS ONCE
Status: COMPLETED | OUTPATIENT
Start: 2021-04-08 | End: 2021-04-08

## 2021-04-08 RX ADMIN — ENOXAPARIN SODIUM 40 MG: 40 INJECTION SUBCUTANEOUS at 21:00

## 2021-04-08 RX ADMIN — TICAGRELOR 90 MG: 90 TABLET ORAL at 21:00

## 2021-04-08 RX ADMIN — Medication: at 07:36

## 2021-04-08 RX ADMIN — ASPIRIN 81 MG: 81 TABLET, CHEWABLE ORAL at 09:12

## 2021-04-08 RX ADMIN — BUDESONIDE 0.5 MG: 0.5 INHALANT ORAL at 08:02

## 2021-04-08 RX ADMIN — HYDROMORPHONE HYDROCHLORIDE 1 MG: 1 INJECTION, SOLUTION INTRAMUSCULAR; INTRAVENOUS; SUBCUTANEOUS at 21:47

## 2021-04-08 RX ADMIN — CEFAZOLIN SODIUM 2000 MG: 2 SOLUTION INTRAVENOUS at 18:14

## 2021-04-08 RX ADMIN — IPRATROPIUM BROMIDE 0.5 MG: 0.5 SOLUTION RESPIRATORY (INHALATION) at 13:45

## 2021-04-08 RX ADMIN — METHOCARBAMOL TABLETS 750 MG: 750 TABLET, COATED ORAL at 23:06

## 2021-04-08 RX ADMIN — ATORVASTATIN CALCIUM 80 MG: 80 TABLET, FILM COATED ORAL at 18:13

## 2021-04-08 RX ADMIN — ENOXAPARIN SODIUM 40 MG: 40 INJECTION SUBCUTANEOUS at 11:20

## 2021-04-08 RX ADMIN — OXYCODONE HYDROCHLORIDE 10 MG: 10 TABLET ORAL at 03:05

## 2021-04-08 RX ADMIN — CEFAZOLIN SODIUM 2000 MG: 2 SOLUTION INTRAVENOUS at 02:58

## 2021-04-08 RX ADMIN — FENTANYL CITRATE 50 MCG: 50 INJECTION, SOLUTION INTRAMUSCULAR; INTRAVENOUS at 10:40

## 2021-04-08 RX ADMIN — IPRATROPIUM BROMIDE 0.5 MG: 0.5 SOLUTION RESPIRATORY (INHALATION) at 08:02

## 2021-04-08 RX ADMIN — GABAPENTIN 100 MG: 100 CAPSULE ORAL at 21:00

## 2021-04-08 RX ADMIN — LEVALBUTEROL HYDROCHLORIDE 1.25 MG: 1.25 SOLUTION, CONCENTRATE RESPIRATORY (INHALATION) at 08:02

## 2021-04-08 RX ADMIN — HEPARIN SODIUM 7500 UNITS: 5000 INJECTION INTRAVENOUS; SUBCUTANEOUS at 06:19

## 2021-04-08 RX ADMIN — CHLORHEXIDINE GLUCONATE 0.12% ORAL RINSE 15 ML: 1.2 LIQUID ORAL at 09:12

## 2021-04-08 RX ADMIN — INSULIN LISPRO 4 UNITS: 100 INJECTION, SOLUTION INTRAVENOUS; SUBCUTANEOUS at 21:43

## 2021-04-08 RX ADMIN — TICAGRELOR 90 MG: 90 TABLET ORAL at 09:12

## 2021-04-08 RX ADMIN — IPRATROPIUM BROMIDE 0.5 MG: 0.5 SOLUTION RESPIRATORY (INHALATION) at 01:03

## 2021-04-08 RX ADMIN — ACETAMINOPHEN 975 MG: 325 TABLET ORAL at 10:47

## 2021-04-08 RX ADMIN — CEFAZOLIN SODIUM 2000 MG: 2 SOLUTION INTRAVENOUS at 10:48

## 2021-04-08 RX ADMIN — HYDROMORPHONE HYDROCHLORIDE 1 MG: 1 INJECTION, SOLUTION INTRAMUSCULAR; INTRAVENOUS; SUBCUTANEOUS at 01:07

## 2021-04-08 RX ADMIN — BUDESONIDE 0.5 MG: 0.5 INHALANT ORAL at 21:14

## 2021-04-08 RX ADMIN — HYDROMORPHONE HYDROCHLORIDE 1 MG: 1 INJECTION, SOLUTION INTRAMUSCULAR; INTRAVENOUS; SUBCUTANEOUS at 07:25

## 2021-04-08 RX ADMIN — LEVALBUTEROL HYDROCHLORIDE 1.25 MG: 1.25 SOLUTION, CONCENTRATE RESPIRATORY (INHALATION) at 21:14

## 2021-04-08 RX ADMIN — ACETAMINOPHEN 975 MG: 325 TABLET ORAL at 21:04

## 2021-04-08 RX ADMIN — FENTANYL CITRATE 50 MCG: 50 INJECTION INTRAMUSCULAR; INTRAVENOUS at 10:40

## 2021-04-08 RX ADMIN — LEVALBUTEROL HYDROCHLORIDE 1.25 MG: 1.25 SOLUTION, CONCENTRATE RESPIRATORY (INHALATION) at 01:03

## 2021-04-08 RX ADMIN — MIDAZOLAM HYDROCHLORIDE 2 MG: 2 INJECTION, SOLUTION INTRAMUSCULAR; INTRAVENOUS at 10:20

## 2021-04-08 RX ADMIN — FENTANYL CITRATE 100 MCG: 50 INJECTION INTRAMUSCULAR; INTRAVENOUS at 10:20

## 2021-04-08 RX ADMIN — HYDROMORPHONE HYDROCHLORIDE 1 MG: 1 INJECTION, SOLUTION INTRAMUSCULAR; INTRAVENOUS; SUBCUTANEOUS at 04:13

## 2021-04-08 RX ADMIN — IPRATROPIUM BROMIDE 0.5 MG: 0.5 SOLUTION RESPIRATORY (INHALATION) at 21:14

## 2021-04-08 RX ADMIN — GABAPENTIN 100 MG: 100 CAPSULE ORAL at 10:48

## 2021-04-08 RX ADMIN — CHLORHEXIDINE GLUCONATE 0.12% ORAL RINSE 15 ML: 1.2 LIQUID ORAL at 21:00

## 2021-04-08 RX ADMIN — METHOCARBAMOL TABLETS 750 MG: 750 TABLET, COATED ORAL at 11:20

## 2021-04-08 RX ADMIN — MIDAZOLAM 2 MG: 1 INJECTION INTRAMUSCULAR; INTRAVENOUS at 10:20

## 2021-04-08 RX ADMIN — METHOCARBAMOL TABLETS 750 MG: 750 TABLET, COATED ORAL at 18:13

## 2021-04-08 RX ADMIN — ACETAMINOPHEN 975 MG: 325 TABLET ORAL at 14:12

## 2021-04-08 RX ADMIN — LEVALBUTEROL HYDROCHLORIDE 1.25 MG: 1.25 SOLUTION, CONCENTRATE RESPIRATORY (INHALATION) at 13:45

## 2021-04-08 NOTE — PROGRESS NOTES
Daily Progress Note - Critical Care   Delorse Goodpasture 45 y o  male MRN: [id number]  Unit/Bed#: [facility] 515-01 Encounter: [id number]        ----------------------------------------------------------------------------------------  HPI/24hr events: POD 2; s/p excision of L groin/thigh hidradenitis suppurativa with wound vac placement  Extubated yesterday; currently on midflow NC @ 10     ---------------------------------------------------------------------------------------  SUBJECTIVE  C/o: pain around op site; now on dilaudid PCA     ---------------------------------------------------------------------------------------  Assessment and Plan:    Neuro:    Diagnosis: post-operative pain  o Plan: continue dilaudid PCA for now; will plan on transitioning to multi-modal PO analgesia  CV:    Diagnosis: CAD; s/p MI, PCI 12/20   o Plan: continue ASA and brilinta   o Continue statin   o Telemetry monitoring  Pulm:   Diagnosis: extubated and currently on midflow NC  o Plan: wean off O2   o Incentive spirometry/pulmonary toilet  o Ambulate   o Albuterol  GI:    Diagnosis: no acute issues  o Plan: continue PO diet as tolerated  :    Diagnosis: no acute issues; voiding adequately  Post-op CLOVIS (pre-renal) resolved; Cr 1 1 (1 9)  o Plan: continue Cr trend  o Discontinue lewis catheter  F/E/N:    Plan: continue PO diet as tolerated   Trend and replete electrolytes as needed   Discontinue IVF with optimal PO intake  Heme/Onc:    Diagnosis: no acute issues   Post op Hb trend 10 2 (9 5)  o Plan: continue CBC trend  Endo:    Diagnosis: DM  o Plan: continue ISS    ID:    Diagnosis: L thigh/groin hidradenitis; s/p wide excision  o Plan: continue ancef      MSK/Skin:    Diagnosis: L thigh/groin hidradenitis; s/p excision  o Plan: continue wound vac therapy  o Local wound care  o Ambulate/PT/OT        Disposition: Transfer to Med Surg with Telemetry   Code Status: Level 1 - Full Code  ---------------------------------------------------------------------------------------  ICU CORE MEASURES    Prophylaxis   VTE Pharmacologic Prophylaxis: Heparin  VTE Mechanical Prophylaxis: sequential compression device  Stress Ulcer Prophylaxis: Prophylaxis Not Indicated     ABCDE Protocol (if indicated)  Plan to perform spontaneous awakening trial today? Not applicable  Plan to perform spontaneous breathing trial today? Not applicable  Obvious barriers to extubation? Not applicable  CAM-ICU: na    Invasive Devices Review  Invasive Devices     Peripheral Intravenous Line            Peripheral IV 21 Distal;Right;Upper;Ventral (anterior) Arm 1 day    Peripheral IV 21 Left Hand 1 day    Peripheral IV 21 Right;Ventral (anterior) Forearm 1 day          Drain            Negative Pressure Wound Therapy (V A C ) Other (Comment) 1 day    Urethral Catheter Latex 16 Fr  1 day              Can any invasive devices be discontinued today? Not applicable  ---------------------------------------------------------------------------------------  OBJECTIVE    Vitals   Vitals:    21 0230 21 0300 21 0400 21 0600   BP:   137/68 141/57   BP Location:   Right arm    Pulse:   70 70   Resp:   (!) 24 (!) 25   Temp:  99 7 °F (37 6 °C)  98 2 °F (36 8 °C)   TempSrc:  Oral  Oral   SpO2: 92%  (!) 89% 90%   Weight:    (!) 164 kg (362 lb 3 5 oz)   Height:         Temp (24hrs), Av 7 °F (37 1 °C), Min:97 8 °F (36 6 °C), Max:99 7 °F (37 6 °C)  Current: Temperature: 98 2 °F (36 8 °C)    Respiratory:  SpO2: SpO2: 90 %       Invasive/non-invasive ventilation settings   Respiratory    Lab Data (Last 4 hours)    None         O2/Vent Data (Last 4 hours)    None                Physical Exam  Vitals signs reviewed  Constitutional:       General: He is not in acute distress  Appearance: He is obese  He is not ill-appearing or toxic-appearing  HENT:      Head: Normocephalic and atraumatic     Eyes: Pupils: Pupils are equal, round, and reactive to light  Cardiovascular:      Rate and Rhythm: Normal rate  Pulses: Normal pulses  Pulmonary:      Effort: Pulmonary effort is normal    Abdominal:      General: There is no distension  Palpations: Abdomen is soft  Tenderness: There is no abdominal tenderness  Musculoskeletal:      Comments: Wound vac in situ over L groin/upper thigh  Skin:     General: Skin is warm  Capillary Refill: Capillary refill takes less than 2 seconds  Neurological:      General: No focal deficit present  Mental Status: He is alert and oriented to person, place, and time     Psychiatric:         Mood and Affect: Mood normal          Laboratory and Diagnostics:  Results from last 7 days   Lab Units 04/08/21 0446 04/07/21  0615 04/06/21  1819 04/06/21  1152   WBC Thousand/uL 13 88* 13 14* 19 22* 15 01*   HEMOGLOBIN g/dL 10 2* 9 5* 10 8* 12 5   HEMATOCRIT % 32 3* 31 5* 33 8* 40 2   PLATELETS Thousands/uL 347 320 388 392*   NEUTROS PCT % 79* 81*  --  88*   MONOS PCT % 9 8  --  2*     Results from last 7 days   Lab Units 04/08/21 0446 04/07/21  0514 04/06/21  1819 04/06/21  1153   SODIUM mmol/L 136 138 132* 134*   POTASSIUM mmol/L 4 3 5 7* 6 7* 5 9*   CHLORIDE mmol/L 102 109* 103 104   CO2 mmol/L 29 23 23 23   ANION GAP mmol/L 5 6 6 7   BUN mg/dL 18 35* 30* 25   CREATININE mg/dL 1 12 1 91* 1 82* 1 47*   CALCIUM mg/dL 8 3 7 0* 8 0* 8 8   GLUCOSE RANDOM mg/dL 126 147* 225* 243*   ALT U/L  --  15  --   --    AST U/L  --  27  --   --    ALK PHOS U/L  --  63  --   --    ALBUMIN g/dL  --  2 1*  --   --    TOTAL BILIRUBIN mg/dL  --  0 47  --   --      Results from last 7 days   Lab Units 04/08/21 0446 04/07/21  0514   MAGNESIUM mg/dL 2 0 1 9   PHOSPHORUS mg/dL 2 9 4 7*           Results from last 7 days   Lab Units 04/06/21  1339   TROPONIN I ng/mL <0 02     Results from last 7 days   Lab Units 04/06/21  2208 04/06/21  1152   LACTIC ACID mmol/L 1 6 1 5 ABG:  Results from last 7 days   Lab Units 04/06/21  1810   PH ART  7 357   PCO2 ART mm Hg 44 4*   PO2 ART mm Hg 76 4   HCO3 ART mmol/L 24 4   BASE EXC ART mmol/L -1 3   ABG SOURCE  Radial, Left     VBG:  Results from last 7 days   Lab Units 04/06/21 2208 04/06/21  1810   PH MAGUE  7 300  --    PCO2 MAGUE mm Hg 55 2*  --    PO2 MAGUE mm Hg 53 6*  --    HCO3 MAGUE mmol/L 26 6  --    BASE EXC MAGUE mmol/L -0 5  --    ABG SOURCE   --  Radial, Left           Micro        Imaging: I have personally reviewed pertinent reports  Intake and Output  I/O       04/06 0701 - 04/07 0700 04/07 0701 - 04/08 0700 04/08 0701 - 04/09 0700    P  O  0 400     I V  (mL/kg) 4812 7 (29) 1309 (8)     NG/GT 60 50     IV Piggyback 1350 180     Total Intake(mL/kg) 6222 7 (37 5) 1939 (11 8)     Urine (mL/kg/hr) 855 (0 2) 3660 (0 9)     Emesis/NG output 250 50     Drains 125 220     Stool 0 0     Blood 500      Total Output 1730 3930     Net +4492 7 -1991            Unmeasured Stool Occurrence 0 x 0 x         UOP: 3 7 L/24 h    Height and Weights   Height: 5' 6 75" (169 5 cm)  IBW: 65 53 kg  Body mass index is 57 16 kg/m²  Weight (last 2 days)     Date/Time   Weight    04/08/21 0600   (!) 164 (362 22)    04/07/21 0559   (!) 166 (365 52)    04/06/21 0634   (!) 168 (370)                Nutrition       Diet Orders   (From admission, onward)             Start     Ordered    04/07/21 1705  Diet Markie/CHO Controlled; Consistent Carbohydrate Diet Level 2 (5 carb servings/75 grams CHO/meal)  Diet effective now     Question Answer Comment   Diet Type Markie/CHO Controlled    Markie/CHO Controlled Consistent Carbohydrate Diet Level 2 (5 carb servings/75 grams CHO/meal)    RD to adjust diet per protocol?  Yes        04/07/21 1705                  Active Medications  Scheduled Meds:  Current Facility-Administered Medications   Medication Dose Route Frequency Provider Last Rate    aspirin  81 mg Per NG Tube Daily Jess Zimmerman MD      atorvastatin  80 mg Per NG Tube Daily With Wilmer Kerr MD      budesonide  0 5 mg Nebulization BID Diane Dunn MD      cefazolin  2,000 mg Intravenous Q8H Belkis Loera, DO Stopped (04/08/21 0320)    chlorhexidine  15 mL Swish & Spit Q12H [address] Ann Rebollar MD      heparin (porcine)  7,500 Units Subcutaneous [facility] Ann Rebollar MD      HYDROmorphone   Intravenous Continuous Richi Carrasco MD      HYDROmorphone  1 mg Intravenous Q3H PRN Richi Carrasco MD      insulin lispro  4-20 Units Subcutaneous Q6H [address] Ernesto Castanon DO      ipratropium  0 5 mg Nebulization Q6H Diane Dunn MD      levalbuterol  1 25 mg Nebulization Q6H Diane Dunn MD      midazolam  2 mg Intravenous Once Claudiamaycol Loera, DO      naloxone  0 1 mg Intravenous Q3 min PRN Richi Carrasco MD      ticagrelor  90 mg Per NG Tube Q12H [address] Mimi Jordan MD       Continuous Infusions:  HYDROmorphone,       PRN Meds:   HYDROmorphone, 1 mg, Q3H PRN  naloxone, 0 1 mg, Q3 min PRN        Allergies   Allergies   Allergen Reactions    Amoxicillin Hives     ---------------------------------------------------------------------------------------  Advance Directive and Living Will:      Power of :    POLST:    ---------------------------------------------------------------------------------------  Care Time Delivered:   30 mins    Danette Camejo MD      Portions of the record may have been created with voice recognition software  Occasional wrong word or "sound a like" substitutions may have occurred due to the inherent limitations of voice recognition software    Read the chart carefully and recognize, using context, where substitutions have occurred

## 2021-04-08 NOTE — PLAN OF CARE
Problem: Prexisting or High Potential for Compromised Skin Integrity  Goal: Skin integrity is maintained or improved  Description: INTERVENTIONS:  - Identify patients at risk for skin breakdown  - Assess and monitor skin integrity  - Assess and monitor nutrition and hydration status  - Monitor labs   - Assess for incontinence   - Turn and reposition patient  - Assist with mobility/ambulation  - Relieve pressure over bony prominences  - Avoid friction and shearing  - Provide appropriate hygiene as needed including keeping skin clean and dry  - Evaluate need for skin moisturizer/barrier cream  - Collaborate with interdisciplinary team   - Patient/family teaching  - Consider wound care consult   4/8/2021 0327 by Yasmine Mcmahon RN  Outcome: Progressing  4/8/2021 0327 by Yasmine Mcmahon RN  Outcome: Progressing     Problem: PAIN - ADULT  Goal: Verbalizes/displays adequate comfort level or baseline comfort level  Description: Interventions:  - Encourage patient to monitor pain and request assistance  - Assess pain using appropriate pain scale  - Administer analgesics based on type and severity of pain and evaluate response  - Implement non-pharmacological measures as appropriate and evaluate response  - Consider cultural and social influences on pain and pain management  - Notify physician/advanced practitioner if interventions unsuccessful or patient reports new pain  4/8/2021 0327 by Yasmine Mcmahon RN  Outcome: Not Progressing  4/8/2021 0327 by Yasmine Mcmahon RN  Outcome: Not Progressing     Problem: SAFETY ADULT  Goal: Patient will remain free of falls  Description: INTERVENTIONS:  - Assess patient frequently for physical needs  -  Identify cognitive and physical deficits and behaviors that affect risk of falls    -  [place] fall precautions as indicated by assessment   - Educate patient/family on patient safety including physical limitations  - Instruct patient to call for assistance with activity based on assessment  - Modify environment to reduce risk of injury  - Consider OT/PT consult to assist with strengthening/mobility  4/8/2021 0327 by Asael Johns RN  Outcome: Progressing  4/8/2021 0327 by Asael Johns RN  Outcome: Progressing  Goal: Maintain or return to baseline ADL function  Description: INTERVENTIONS:  -  Assess patient's ability to carry out ADLs; assess patient's baseline for ADL function and identify physical deficits which impact ability to perform ADLs (bathing, care of mouth/teeth, toileting, grooming, dressing, etc )  - Assess/evaluate cause of self-care deficits   - Assess range of motion  - Assess patient's mobility; develop plan if impaired  - Assess patient's need for assistive devices and provide as appropriate  - Encourage maximum independence but intervene and supervise when necessary  - Involve family in performance of ADLs  - Assess for home care needs following discharge   - Consider OT consult to assist with ADL evaluation and planning for discharge  - Provide patient education as appropriate  Outcome: Progressing     Problem: Knowledge Deficit  Goal: Patient/family/caregiver demonstrates understanding of disease process, treatment plan, medications, and discharge instructions  Description: Complete learning assessment and assess knowledge base    Interventions:  - Provide teaching at level of understanding  - Provide teaching via preferred learning methods  Outcome: Progressing     Problem: CARDIOVASCULAR - ADULT  Goal: Absence of cardiac dysrhythmias or at baseline rhythm  Description: INTERVENTIONS:  - Continuous cardiac monitoring, vital signs, obtain 12 lead EKG if ordered  - Administer antiarrhythmic and heart rate control medications as ordered  - Monitor electrolytes and administer replacement therapy as ordered  Outcome: Progressing     Problem: RESPIRATORY - ADULT  Goal: Achieves optimal ventilation and oxygenation  Description: INTERVENTIONS:  - Assess for changes in respiratory status  - Assess for changes in mentation and behavior  - Position to facilitate oxygenation and minimize respiratory effort  - Oxygen administered by appropriate delivery if ordered  - Initiate smoking cessation education as indicated  - Encourage broncho-pulmonary hygiene including cough, deep breathe, Incentive Spirometry  - Assess the need for suctioning and aspirate as needed  - Assess and instruct to report SOB or any respiratory difficulty  - Respiratory Therapy support as indicated  Outcome: Progressing     Problem: Potential for Falls  Goal: Patient will remain free of falls  Description: INTERVENTIONS:  - Assess patient frequently for physical needs  -  Identify cognitive and physical deficits and behaviors that affect risk of falls    -  [place] fall precautions as indicated by assessment   - Educate patient/family on patient safety including physical limitations  - Instruct patient to call for assistance with activity based on assessment  - Modify environment to reduce risk of injury  - Consider OT/PT consult to assist with strengthening/mobility  4/8/2021 0327 by Richelle Schuler RN  Outcome: Progressing  4/8/2021 0327 by Richelle Schuler RN  Outcome: Progressing

## 2021-04-08 NOTE — CASE MANAGEMENT
The patient is not a readmission or a Medicare Bundle  He has readmission risk score of 15  Met with the patient to complete assessment and explain role of CM  He lives with his 24 yo son  The two floor home has 10 [address]RIANA and a flight of steps to second floor bedroom and bathroom  Patient was independent, employed as a , and drives  He has no DME or HHC and denies MH and D&A treatment  Discussed anticipated need for wound VAC and HHC at discharge  Patient to have therapy evaluations  He has no advance directive and his father, Shasta Sicard, is [address], [phone]  PCP is Caty Arnold  The patient has prescription coverage and uses [facility] [address]   CM reviewed d/c planning process including the following: identifying help at home, patient preference for d/c planning needs, Discharge Lounge, [facility] Meds to Bed program, availability of treatment team to discuss questions or concerns patient and/or family may have regarding understanding medications and recognizing signs and symptoms once discharged  CM also encouraged patient to follow up with all recommended appointments after discharge  Patient advised of importance for patient and family to participate in managing patients medical well being  VAC paperwork was placed in patient's chart

## 2021-04-08 NOTE — PROGRESS NOTES
Progress Note - General Surgery   Bozena Noriega 45 y o  male MRN: [id number]  Unit/Bed#: [facility] 515-01 Encounter: [id number]    Assessment/Plan:  45 y o  male with left thigh soft tissue mass s/p excision and vac placement (4/6)     Wean HFNC  Diet as tolerated  Dilaudid PCA  VAC dressing changes T/F  Appreciate critical care management    Subjective/Objective     Subjective:  No acute events  Doing well this morning  Poor pain control  Tolerating diet  Objective:     Vitals: Temp:  [97 8 °F (36 6 °C)-99 7 °F (37 6 °C)] 99 7 °F (37 6 °C)  HR:  [56-98] 76  Resp:  [12-44] 25  BP: (101-150)/(53-78) 150/63  FiO2 (%):  [100] 100  Body mass index is 57 68 kg/m²  Intake/Output Summary (Last 24 hours) at 4/8/2021 8784  Last data filed at 4/8/2021 0320  Gross per 24 hour   Intake 1939 02 ml   Output 3295 ml   Net -1355 98 ml       Physical Exam:  GEN: NAD  HEENT: MMM, nasal cannula in place  CV:  Warm/well perfused  Lung: normal effort  Ab: Soft, NT/ND  Extrem:  Left thigh VAC with good seal and serosanguineous drainage canister  Neuro:  A+Ox3, motor and sensation grossly intact    Lab, Imaging and other studies:   CBC with diff:   Lab Results   Component Value Date    WBC 13 88 (H) 04/08/2021    HGB 10 2 (L) 04/08/2021    HCT 32 3 (L) 04/08/2021    MCV 90 04/08/2021     04/08/2021    MCH 28 5 04/08/2021    MCHC 31 6 04/08/2021    RDW 13 8 04/08/2021    MPV 9 2 04/08/2021    NRBC 0 04/08/2021   , BMP/CMP:   Lab Results   Component Value Date    SODIUM 136 04/08/2021    K 4 3 04/08/2021     04/08/2021    CO2 29 04/08/2021    BUN 18 04/08/2021    CREATININE 1 12 04/08/2021    CALCIUM 8 3 04/08/2021    EGFR 82 04/08/2021   , Magnesium: No components found for: MAG, Coags: No results found for: PT, PTT, INR  VTE Pharmacologic Prophylaxis: Heparin  VTE Mechanical Prophylaxis: sequential compression device

## 2021-04-08 NOTE — QUICK NOTE
Acute Care Surgery  Bedside V A C  Procedure Note    A timeout was performed with the patient's nurse, John Barker, prior to beginning the dressing change  The nurse remained present to confirm the correct dressing counts on removal of the VAC dressing and was debriefed at the completion of the dressing change  Prior to the initiation of procedure; respiratory is at bedside as well as nursing  Patient was given 150 mcg of fentanyl and 2 mg of Versed  Patient was placed on a non-rebreather as well to assist in respiratory status  Saturations never dropped below 88  Patient was placed in the left lateral decubitus position  Location of wound:  Left posterior/medial thigh    Dressings and Foam removed:  1 Xeroform Dressings  2 Black Foam    Dimensions of wound:  35 cm x 14 cm x 4 cm    Description of wound: On inspection of the wound today, the wound appeared healthy and granulation tissue was starting to form  The wound extends down to muscle belly  Approximately 95% of the wound base is now pink and healthy and there is granulation tissue  The periwound skin remains clean and intact and unremarkable  VAC dressing application:  The periwound skin was cleaned and dried  1 Xeroform dressings, 1 black foam,  were cut to size of the wound and placed into the wound  The dressings were then covered with VAC drape  The VAC was then set to 125 mmHg low Continuous suction  The patient tolerated the procedure well and there were no complications  The patient did not require any excisional debridement during today's dressing change  VAC settings:  125 mmHg  Continuous    Wound Images: Additional Notes: The [facility] sticker placed over the dressing per protocol  The next [facility] dressing change will be planned for 04/10/2021  The [facility] paperwork was completed and give to the case management staff to arrange home VAC therapy  Dr Nick Islas was present for the dressing change      This dressing change took greater than 32 minutes to complete      Yris Contreras PA-C  4/8/2021 10:47 AM

## 2021-04-09 LAB
ABO GROUP BLD BPU: NORMAL
ABO GROUP BLD BPU: NORMAL
ANION GAP SERPL CALCULATED.3IONS-SCNC: 7 MMOL/L (ref 4–13)
BASOPHILS # BLD AUTO: 0.04 THOUSANDS/ΜL (ref 0–0.1)
BASOPHILS NFR BLD AUTO: 0 % (ref 0–1)
BPU ID: NORMAL
BPU ID: NORMAL
BUN SERPL-MCNC: 13 MG/DL (ref 5–25)
CALCIUM SERPL-MCNC: 8.5 MG/DL (ref 8.3–10.1)
CHLORIDE SERPL-SCNC: 101 MMOL/L (ref 100–108)
CO2 SERPL-SCNC: 29 MMOL/L (ref 21–32)
CREAT SERPL-MCNC: 1.04 MG/DL (ref 0.6–1.3)
CROSSMATCH: NORMAL
CROSSMATCH: NORMAL
EOSINOPHIL # BLD AUTO: 0.22 THOUSAND/ΜL (ref 0–0.61)
EOSINOPHIL NFR BLD AUTO: 2 % (ref 0–6)
ERYTHROCYTE [DISTWIDTH] IN BLOOD BY AUTOMATED COUNT: 13.6 % (ref 11.6–15.1)
GFR SERPL CREATININE-BSD FRML MDRD: 90 ML/MIN/1.73SQ M
GLUCOSE SERPL-MCNC: 168 MG/DL (ref 65–140)
GLUCOSE SERPL-MCNC: 168 MG/DL (ref 65–140)
GLUCOSE SERPL-MCNC: 180 MG/DL (ref 65–140)
GLUCOSE SERPL-MCNC: 190 MG/DL (ref 65–140)
GLUCOSE SERPL-MCNC: 236 MG/DL (ref 65–140)
HCT VFR BLD AUTO: 31.4 % (ref 36.5–49.3)
HGB BLD-MCNC: 10.1 G/DL (ref 12–17)
IMM GRANULOCYTES # BLD AUTO: 0.07 THOUSAND/UL (ref 0–0.2)
IMM GRANULOCYTES NFR BLD AUTO: 1 % (ref 0–2)
LYMPHOCYTES # BLD AUTO: 1.48 THOUSANDS/ΜL (ref 0.6–4.47)
LYMPHOCYTES NFR BLD AUTO: 11 % (ref 14–44)
MCH RBC QN AUTO: 28.1 PG (ref 26.8–34.3)
MCHC RBC AUTO-ENTMCNC: 32.2 G/DL (ref 31.4–37.4)
MCV RBC AUTO: 88 FL (ref 82–98)
MONOCYTES # BLD AUTO: 0.9 THOUSAND/ΜL (ref 0.17–1.22)
MONOCYTES NFR BLD AUTO: 7 % (ref 4–12)
NEUTROPHILS # BLD AUTO: 10.85 THOUSANDS/ΜL (ref 1.85–7.62)
NEUTS SEG NFR BLD AUTO: 79 % (ref 43–75)
NRBC BLD AUTO-RTO: 0 /100 WBCS
PLATELET # BLD AUTO: 350 THOUSANDS/UL (ref 149–390)
PMV BLD AUTO: 9.2 FL (ref 8.9–12.7)
POTASSIUM SERPL-SCNC: 4.2 MMOL/L (ref 3.5–5.3)
RBC # BLD AUTO: 3.59 MILLION/UL (ref 3.88–5.62)
SODIUM SERPL-SCNC: 137 MMOL/L (ref 136–145)
UNIT DISPENSE STATUS: NORMAL
UNIT DISPENSE STATUS: NORMAL
UNIT PRODUCT CODE: NORMAL
UNIT PRODUCT CODE: NORMAL
UNIT RH: NORMAL
UNIT RH: NORMAL
WBC # BLD AUTO: 13.56 THOUSAND/UL (ref 4.31–10.16)

## 2021-04-09 PROCEDURE — 80048 BASIC METABOLIC PNL TOTAL CA: CPT | Performed by: STUDENT IN AN ORGANIZED HEALTH CARE EDUCATION/TRAINING PROGRAM

## 2021-04-09 PROCEDURE — 94760 N-INVAS EAR/PLS OXIMETRY 1: CPT

## 2021-04-09 PROCEDURE — 99024 POSTOP FOLLOW-UP VISIT: CPT | Performed by: SURGERY

## 2021-04-09 PROCEDURE — 94664 DEMO&/EVAL PT USE INHALER: CPT

## 2021-04-09 PROCEDURE — 85025 COMPLETE CBC W/AUTO DIFF WBC: CPT | Performed by: STUDENT IN AN ORGANIZED HEALTH CARE EDUCATION/TRAINING PROGRAM

## 2021-04-09 PROCEDURE — 82948 REAGENT STRIP/BLOOD GLUCOSE: CPT

## 2021-04-09 PROCEDURE — 94640 AIRWAY INHALATION TREATMENT: CPT

## 2021-04-09 PROCEDURE — 97163 PT EVAL HIGH COMPLEX 45 MIN: CPT

## 2021-04-09 PROCEDURE — 97167 OT EVAL HIGH COMPLEX 60 MIN: CPT

## 2021-04-09 RX ADMIN — HYDROMORPHONE HYDROCHLORIDE 1 MG: 1 INJECTION, SOLUTION INTRAMUSCULAR; INTRAVENOUS; SUBCUTANEOUS at 03:33

## 2021-04-09 RX ADMIN — GABAPENTIN 100 MG: 100 CAPSULE ORAL at 08:08

## 2021-04-09 RX ADMIN — CEFAZOLIN SODIUM 2000 MG: 2 SOLUTION INTRAVENOUS at 21:03

## 2021-04-09 RX ADMIN — INSULIN LISPRO 4 UNITS: 100 INJECTION, SOLUTION INTRAVENOUS; SUBCUTANEOUS at 21:03

## 2021-04-09 RX ADMIN — ACETAMINOPHEN 975 MG: 325 TABLET ORAL at 05:08

## 2021-04-09 RX ADMIN — INSULIN LISPRO 4 UNITS: 100 INJECTION, SOLUTION INTRAVENOUS; SUBCUTANEOUS at 06:28

## 2021-04-09 RX ADMIN — IPRATROPIUM BROMIDE 0.5 MG: 0.5 SOLUTION RESPIRATORY (INHALATION) at 07:26

## 2021-04-09 RX ADMIN — LEVALBUTEROL HYDROCHLORIDE 1.25 MG: 1.25 SOLUTION, CONCENTRATE RESPIRATORY (INHALATION) at 19:42

## 2021-04-09 RX ADMIN — IPRATROPIUM BROMIDE 0.5 MG: 0.5 SOLUTION RESPIRATORY (INHALATION) at 02:20

## 2021-04-09 RX ADMIN — ATORVASTATIN CALCIUM 80 MG: 80 TABLET, FILM COATED ORAL at 16:22

## 2021-04-09 RX ADMIN — CHLORHEXIDINE GLUCONATE 0.12% ORAL RINSE 15 ML: 1.2 LIQUID ORAL at 21:02

## 2021-04-09 RX ADMIN — INSULIN LISPRO 4 UNITS: 100 INJECTION, SOLUTION INTRAVENOUS; SUBCUTANEOUS at 16:22

## 2021-04-09 RX ADMIN — BUDESONIDE 0.5 MG: 0.5 INHALANT ORAL at 19:42

## 2021-04-09 RX ADMIN — GABAPENTIN 100 MG: 100 CAPSULE ORAL at 16:22

## 2021-04-09 RX ADMIN — BUDESONIDE 0.5 MG: 0.5 INHALANT ORAL at 07:26

## 2021-04-09 RX ADMIN — METHOCARBAMOL TABLETS 750 MG: 750 TABLET, COATED ORAL at 05:08

## 2021-04-09 RX ADMIN — ASPIRIN 81 MG: 81 TABLET, CHEWABLE ORAL at 08:08

## 2021-04-09 RX ADMIN — LEVALBUTEROL HYDROCHLORIDE 1.25 MG: 1.25 SOLUTION, CONCENTRATE RESPIRATORY (INHALATION) at 07:26

## 2021-04-09 RX ADMIN — TICAGRELOR 90 MG: 90 TABLET ORAL at 08:08

## 2021-04-09 RX ADMIN — IPRATROPIUM BROMIDE 0.5 MG: 0.5 SOLUTION RESPIRATORY (INHALATION) at 14:10

## 2021-04-09 RX ADMIN — METHOCARBAMOL TABLETS 750 MG: 750 TABLET, COATED ORAL at 17:15

## 2021-04-09 RX ADMIN — INSULIN LISPRO 8 UNITS: 100 INJECTION, SOLUTION INTRAVENOUS; SUBCUTANEOUS at 11:27

## 2021-04-09 RX ADMIN — Medication: at 18:33

## 2021-04-09 RX ADMIN — ENOXAPARIN SODIUM 60 MG: 60 INJECTION SUBCUTANEOUS at 21:03

## 2021-04-09 RX ADMIN — METHOCARBAMOL TABLETS 750 MG: 750 TABLET, COATED ORAL at 23:30

## 2021-04-09 RX ADMIN — ACETAMINOPHEN 975 MG: 325 TABLET ORAL at 13:23

## 2021-04-09 RX ADMIN — ENOXAPARIN SODIUM 40 MG: 40 INJECTION SUBCUTANEOUS at 08:08

## 2021-04-09 RX ADMIN — CEFAZOLIN SODIUM 2000 MG: 2 SOLUTION INTRAVENOUS at 13:10

## 2021-04-09 RX ADMIN — METHOCARBAMOL TABLETS 750 MG: 750 TABLET, COATED ORAL at 11:27

## 2021-04-09 RX ADMIN — IPRATROPIUM BROMIDE 0.5 MG: 0.5 SOLUTION RESPIRATORY (INHALATION) at 19:42

## 2021-04-09 RX ADMIN — CEFAZOLIN SODIUM 2000 MG: 2 SOLUTION INTRAVENOUS at 05:05

## 2021-04-09 RX ADMIN — HYDROMORPHONE HYDROCHLORIDE 1 MG: 1 INJECTION, SOLUTION INTRAMUSCULAR; INTRAVENOUS; SUBCUTANEOUS at 22:35

## 2021-04-09 RX ADMIN — LEVALBUTEROL HYDROCHLORIDE 1.25 MG: 1.25 SOLUTION, CONCENTRATE RESPIRATORY (INHALATION) at 02:20

## 2021-04-09 RX ADMIN — HYDROMORPHONE HYDROCHLORIDE 1 MG: 1 INJECTION, SOLUTION INTRAMUSCULAR; INTRAVENOUS; SUBCUTANEOUS at 09:31

## 2021-04-09 RX ADMIN — CHLORHEXIDINE GLUCONATE 0.12% ORAL RINSE 15 ML: 1.2 LIQUID ORAL at 08:08

## 2021-04-09 RX ADMIN — HYDROMORPHONE HYDROCHLORIDE 1 MG: 1 INJECTION, SOLUTION INTRAMUSCULAR; INTRAVENOUS; SUBCUTANEOUS at 19:32

## 2021-04-09 RX ADMIN — HYDROMORPHONE HYDROCHLORIDE 1 MG: 1 INJECTION, SOLUTION INTRAMUSCULAR; INTRAVENOUS; SUBCUTANEOUS at 16:30

## 2021-04-09 RX ADMIN — LEVALBUTEROL HYDROCHLORIDE 1.25 MG: 1.25 SOLUTION, CONCENTRATE RESPIRATORY (INHALATION) at 14:10

## 2021-04-09 RX ADMIN — ACETAMINOPHEN 975 MG: 325 TABLET ORAL at 21:02

## 2021-04-09 RX ADMIN — TICAGRELOR 90 MG: 90 TABLET ORAL at 21:02

## 2021-04-09 RX ADMIN — GABAPENTIN 100 MG: 100 CAPSULE ORAL at 21:02

## 2021-04-09 NOTE — QUICK NOTE
Nurse-Patient-Provider rounds were completed with the patient's nurse today, Rey Betancourt  We discussed the plan is to assess VAC for leak  Continue to wean O2 requirements  Monitor for acute changes in exam  OOB to chair today  We reviewed all of the invasive devices/lines/telemetry orders   - None  DVT Prophylaxis:  - Lovenox and SCDs    Pain Assessment / Plan:  - Continue current analgesic regimen  Mobility Assessment / Plan:  - Activity as tolerated  Goals / Barriers for discharge:  - Wound Care  - O2 requirements  - Resp status  - Improved Mobility  Case management following; case and discharge needs discussed  All questions and concerns were addressed  I spent greater than 12 minutes reviewing the plan with the patient and the nurse, and coordinating care for the day      [place] AUSTIN Paul  4/9/2021

## 2021-04-09 NOTE — PLAN OF CARE
Problem: PHYSICAL THERAPY ADULT  Goal: Performs mobility at highest level of function for planned discharge setting  See evaluation for individualized goals  Description: Treatment/Interventions: Functional transfer training, LE strengthening/ROM, Elevations, Therapeutic exercise, Endurance training, Patient/family training, Equipment eval/education, Bed mobility, Gait training  Equipment Recommended: Armando Best       See flowsheet documentation for full assessment, interventions and recommendations  Note: Prognosis: Good  Problem List: Decreased strength, Decreased range of motion, Decreased endurance, Impaired balance, Decreased mobility, Pain  Assessment: Pt seen for PT evaluation today due to decrease mobility compared to baseline  Pt is a 45 y o  male who was admitted to [facility] for left groin mass on 4/6/2021  Pt s/p excision of L groin mass on 4/7/2021  Pt intubated, but extubated on 4/7/2021  Active orders for PT eval/treat and ambulation at this time  Pt  has a past medical history of Coronary artery disease, Diabetes mellitus ([facility] ), Heart disease, Hidradenitis suppurativa, Hyperlipidemia, Hypertension, MI (myocardial infarction) ([facility] ), Morbid obesity with BMI of 50 0-59 9, adult ([facility] ), Myocardial infarction ([facility] ), Nicotine dependence, Obesity, and Pilonidal cyst  Pt lives with son and roommate  in a 2 SH with 10 ADRIANA  Pt presents with decreased strength, endurance, balance and pain  resulting in decreased bed mobility, functional transfers and functional mobility upon evaluation  Pt currently requires S A for bed mobility and Min A x1 for functional transfers and mobility  Pt would benefit from skilled physical therapy to address these functional limitations  PT to follow pt and recommending rehab  Pt left upright in bedside chair with all needs in reach with all needs in place at end of therapy session   The patient's AM-PAC Basic Mobility Inpatient Short Form Raw Score is 18, Standardized Score is 41 05  A standardized score less than 42 9 suggests the patient may benefit from discharge to post-acute rehabilitation services  Please also refer to the recommendation of the Physical Therapist for safe discharge planning  Barriers to Discharge: Inaccessible home environment, Decreased caregiver support     PT Discharge Recommendation: [address]     PT - OK to Discharge: Yes(once medically cleared)    See flowsheet documentation for full assessment

## 2021-04-09 NOTE — PLAN OF CARE
Problem: OCCUPATIONAL THERAPY ADULT  Goal: Performs self-care activities at highest level of function for planned discharge setting  See evaluation for individualized goals  Description: Treatment Interventions: ADL retraining, Functional transfer training, UE strengthening/ROM, Endurance training, Cognitive reorientation, Patient/family training, Equipment evaluation/education, Compensatory technique education, Energy conservation, Activityengagement          See flowsheet documentation for full assessment, interventions and recommendations  Note: Limitation: Decreased ADL status, Decreased Safe judgement during ADL, Decreased endurance, Decreased high-level ADLs  Prognosis: Fair  Assessment: Pt is a 45yo male seen for OT eval s/p adm to [facility] on 4/6/21 dx'd w/ L groin mass  Pt s/p planned s/p excision and vac placement (4/6)  Pt  has a past medical history of Coronary artery disease, Diabetes mellitus ([facility] ), Heart disease, Hidradenitis suppurativa, Hyperlipidemia, Hypertension, MI (myocardial infarction) ([facility] ), Morbid obesity with BMI of 50 0-59 9, adult ([facility] ), Myocardial infarction ([facility] ), Nicotine dependence, Obesity, and Pilonidal cyst  Pt with active OT orders and activity as tolerated orders  Pt works FT and resides w/ 17yo son and roommate  Pt reports son is home schooled and can provide A, however "my roommate is useless and won't help " Pt was I w/  ADLS and IADLS, drove, & required no use of DME PTA  Pt is currently demonstrating the following occupational deficits: Mod A UB bathing/dressing, Max A LB bathing/dressing and toileting, Min A bed mobiltiy, Min A functional transfers and mobility EOB>chair w/ RW   These deficits that are impacting pt's baseline areas of occupation are a result of the following impairments: pain, endurance, activity tolerance, functional mobility, forward functional reach, balance, functional standing tolerance, unsupportive home environment, decreased I w/ ADLS/IADLS and strength  The following Occupational Performance Areas to address include: grooming, bathing/shower, toilet hygiene, dressing, health maintenance, functional mobility and clothing management  Based on the aforementioned OT evaluation, functional performance deficits, and assessments, pt has been identified as a high complexity evaluation  The patient's raw score on the AM-PAC Daily Activity inpatient short form is 16, standardized score is 35 96, less than 39 4  Patients at this level are likely to benefit from discharge to post-acute rehabilitation services  Please refer to the recommendation of the Occupational Therapist for safe discharge planning  Recommend post-acute rehab services upon D/C   Pt to continue to benefit from acute immediate OT services to address the following goals 3-5x/week to  w/in 7-10 days:      OT Discharge Recommendation: [address]  OT - OK to Discharge: Yes(when medically cleared)

## 2021-04-09 NOTE — PHYSICAL THERAPY NOTE
Physical Therapy Evaluation     Patient's Name: Mallory Wallis    Admitting Diagnosis  Skin lesion of left leg [L98 9]    Problem List  Patient Active Problem List   Diagnosis    Abnormal liver enzymes    CAD (coronary artery disease)    Dyslipidemia    HTN (hypertension)    [facility]    Pilonidal cyst    DM type 2, uncontrolled, with neuropathy ([facility] )    Type 1 non-ST elevation myocardial infarction (NSTEMI) ([facility] )    Left groin mass    Skin abscess    Encounter for other preprocedural examination    Morbid (severe) obesity due to excess calories (HCC)    Class 3 severe obesity due to excess calories with serious comorbidity and body mass index (BMI) of 50 0 to 59 9 in adult ([facility] )    Hypertriglyceridemia    Dyslipidemia (high LDL; low HDL)       Past Medical History  Past Medical History:   Diagnosis Date    Coronary artery disease     Diabetes mellitus ([facility] )     Heart disease     CAD   s/p ptca with 1 stent 2012    Hidradenitis suppurativa     groin    Hyperlipidemia     Hypertension     MI (myocardial infarction) ([place] [id number] )     " silent " M I  in the past    Morbid obesity with BMI of 50 0-59 9, adult ([facility] )     Myocardial infarction ([facility] )     Nicotine dependence     Obesity     Pilonidal cyst        Past Surgical History  Past Surgical History:   Procedure Laterality Date    CORONARY ANGIOPLASTY WITH STENT PLACEMENT      INCISION AND DRAINAGE OF WOUND N/A 1/24/2017    Procedure: INCISION AND DRAINAGE (I&D) BUTTOCK, PILONIDAL CYST;  Surgeon: Shantel Mock MD;  Location: [address] [place] [address];  Service:    [facility] LEG SURGERY Left     I&D of left leg 2012    HI EXC SKIN BENIG >4 CM TRUNK,ARM,LEG Left 4/6/2021    Procedure: EXCISION THIGH MASS;  Surgeon: Elza Gonsales MD;  [facility];  Service: General    HI NEG PRESS WOUND TX, < 50 CM Left 4/6/2021    Procedure: APPLICATION VAC DRESSING THIGH;  Surgeon: Elza Gonsales MD;  [facility];  Service: General        04/09/21 0934   PT Last Visit   PT Visit Date 04/09/21   Note Type   Note type Evaluation   Pain Assessment   Pain Assessment Tool 0-10   Pain Score 6   Pain Location/Orientation Orientation: Left; Location: Groin   Patient's Stated Pain Goal No pain   Hospital Pain Intervention(s) Repositioned; Ambulation/increased activity; Rest   Home Living   Type of Home House  (10 ADRIANA)   Home Layout Two level;Stairs to enter with rails; Other (Comment)  (bedroom in basement)   Bathroom Shower/Tub Tub/shower unit   Bathroom Toilet Standard   Bathroom Equipment Other (Comment)  (denies DME)   [address] Other (Comment)  (denies use)   Additional Comments Pt lives in a 2 SH with 10 [address]RIANA to first floor and main living area with kitchen and bathroom  Pt reports FF down to bedroom in basement  Pt has not DME and is IPTA  Prior Function   Level of [place] Independent with ADLs and functional mobility   Lives With Son;Other (Comment)  (23 yo son, roommate)   Receives Help From Family   ADL Assistance Independent   IADLs Independent   Falls in the last 6 months 0   Vocational Full time employment   Comments Pt lives with 26 yo son  He also has a roommate but reports he would not be any help to him  Pt was I with everything PTA  Restrictions/Precautions   Weight Bearing Precautions Per Order No   Other Precautions Multiple lines;Telemetry;O2;Pain  (Wound VAC, pain pump, 8L O2)   General   Family/Caregiver Present No   Cognition   Overall Cognitive Status WFL   Arousal/Participation Alert   Attention Attends with cues to redirect   Orientation Level Oriented X4   Memory Decreased recall of precautions   Following Commands Follows one step commands without difficulty   Comments Pt pleasant and agreeable to therapy, but somewhat anxious about trying to get up at beginning of session  Pt with decreased recall of precautions  Able to follow one step commands with no issue      RLE Assessment   RLE Assessment   (4/5 functionally)   LLE Assessment   LLE Assessment   (4/5 functionally)   Bed Mobility   Supine to Sit 5  Supervision   Additional items HOB elevated; Increased time required;Verbal cues   Sit to Supine Unable to assess   Additional Comments Pt found supine upon PT arrival  VCs for handplacement, safety and sequencing  Pt left upright in bedside chair with all needs in reach  Transfers   Sit to Stand 4  Minimal assistance   Additional items Assist x 1;Bedrails; Increased time required;Verbal cues   Stand to Sit 4  Minimal assistance   Additional items Assist x 1; Armrests; Increased time required;Verbal cues   Additional Comments Pt transfers with RW  VCs for hand placement,  safety, and sequening  Ambulation/Elevation   Gait pattern Improper Weight shift; Antalgic; Forward Flexion; Wide DEJON; Decreased foot clearance;Decreased L stance; Short stride; Excessively slow   Gait Assistance 4  Minimal assist   Additional items Assist x 1;Verbal cues   Assistive Device Rolling walker   Distance 3 ft to chair   Balance   Static Sitting Good   Dynamic Sitting Fair +   Static Standing Fair +   Dynamic Standing Fair   Ambulatory Fair -   Endurance Deficit   Endurance Deficit Yes   Endurance Deficit Description fatigue, pain, SpO2 desat to mid 80s  Recovered to high 80s with rest     Activity Tolerance   Activity Tolerance Patient limited by fatigue;Patient limited by pain   Medical Staff Made Aware OT Lilli  Nurse Made Aware RN cleared pt to be seen by PT  Assessment   Prognosis Good   Problem List Decreased strength;Decreased range of motion;Decreased endurance; Impaired balance;Decreased mobility;Pain   Assessment Pt seen for PT evaluation today due to decrease mobility compared to baseline  Pt is a 45 y o  male who was admitted to [facility] for left groin mass on 4/6/2021  Pt s/p excision of L groin mass on 4/7/2021  Pt intubated, but extubated on 4/7/2021   Active orders for PT eval/treat and ambulation at this time  Pt  has a past medical history of Coronary artery disease, Diabetes mellitus ([facility] ), Heart disease, Hidradenitis suppurativa, Hyperlipidemia, Hypertension, MI (myocardial infarction) ([facility] ), Morbid obesity with BMI of 50 0-59 9, adult ([facility] ), Myocardial infarction ([facility] ), Nicotine dependence, Obesity, and Pilonidal cyst  Pt lives with son and roommate  in a 2  with 10 ADRIANA  Pt presents with decreased strength, endurance, balance and pain  resulting in decreased bed mobility, functional transfers and functional mobility upon evaluation  Pt currently requires S A for bed mobility and Min A x1 for functional transfers and mobility  Pt would benefit from skilled physical therapy to address these functional limitations  PT to follow pt and recommending rehab  Pt left upright in bedside chair with all needs in reach with all needs in place at end of therapy session  The patient's AM-PAC Basic Mobility Inpatient Short Form Raw Score is 18, Standardized Score is 41 05  A standardized score less than 42 9 suggests the patient may benefit from discharge to post-acute rehabilitation services  Please also refer to the recommendation of the Physical Therapist for safe discharge planning  Barriers to Discharge Inaccessible home environment;Decreased caregiver support   Goals   Patient Goals to decrease pain and walk   STG Expiration Date 04/23/21   Short Term Goal #1 1  Pt will be independent with HEP upon DC  2  Pt will demonstrate improved balance by 1 grade in order to decrease risk for falls  3  Pt will be be able to demonstrate bed mobility with Mod I A to increase independence  4  Pt will be able to demonstrate functional transfers with Mod I A in order to decrease burden on caregiver  5  Pt will be able to ambulate 200 ft with Mod I A with LRAD in order to return to household/community mobility safely    6  Pt will demonstrate ability to climb at least FF of stairs with Mod I A in order to safely navigate home/community  Plan   Treatment/Interventions Functional transfer training;LE strengthening/ROM; Elevations; Therapeutic exercise; Endurance training;Patient/family training;Equipment eval/education; Bed mobility;Gait training   PT Frequency   (3-5x/ wk)   Recommendation   PT Discharge Recommendation [facility] Recommended HD Bariatric wheeled walker   Change/add to [employer]?  No   PT - OK to Discharge Yes  (once medically cleared)   AM-PAC Basic Mobility Inpatient   Turning in Bed Without Bedrails 3   Lying on Back to Sitting on Edge of Flat Bed 3   Moving Bed to Chair 3   Standing Up From Chair 3   Walk in Room 3   Climb 3-5 Stairs 3   Basic Mobility Inpatient Raw Score 18   Basic Mobility Standardized Score 41 05   Modified Osborne Scale   Modified Eulogio Scale 4           Codie Crump, SPT

## 2021-04-09 NOTE — CASE MANAGEMENT
Discussed patient with PT/OT who recommend snf rehab  Met with patient to discuss discharge needs  Explained criteria for acute rehab and snf rehab and gave him snf list to review for choices  VAC paperwork received from surgery and was downloaded to [address] [place] [address]

## 2021-04-09 NOTE — PROGRESS NOTES
Progress Note - General Surgery   Unique Pearl 45 y o  male MRN: [id number]  Unit/Bed#: [facility] 512-01 Encounter: [id number]    Assessment/Plan:  45 y o  male with left thigh soft tissue mass s/p excision and vac placement (4/6)     Diet as tolerated  Dilaudid PCA, will attempt to wean to oral/IV regimen  VAC dressing changes T/R/Sharp  R groin may require I&D  Appreciate critical care management    Subjective/Objective     Subjective:   No acute events overnight  Patient doing well this morning  Pain improving  Complaining of some right groin discomfort with spontaneous drainage appreciated  Patient does have known hidradenitis in the region  Objective:     Vitals: Temp:  [98 1 °F (36 7 °C)-99 5 °F (37 5 °C)] 98 2 °F (36 8 °C)  HR:  [64-90] 65  Resp:  [18-25] 20  BP: (126-166)/(56-79) 126/60  FiO2 (%):  [100] 100  Body mass index is 58 03 kg/m²  Intake/Output Summary (Last 24 hours) at 4/9/2021 0901  Last data filed at 4/9/2021 0900  Gross per 24 hour   Intake 2130 2 ml   Output 2075 ml   Net 55 2 ml       Physical Exam:  GEN: NAD  HEENT: MMM  CV:   Warm/well perfused  Lung: normal effort  Ab: Soft, NT/ND  Extrem:  Left thigh VAC with good seal and serosanguineous drainage in canister  Right thigh with spontaneous slightly sanguinous/ purulent discharge  Neuro:  A+Ox3, motor and sensation grossly intact    Lab, Imaging and other studies:   CBC with diff:   No results found for: WBC, HGB, HCT, MCV, PLT, ADJUSTEDWBC, MCH, MCHC, RDW, MPV, NRBC, BMP/CMP:   No results found for: SODIUM, K, CL, CO2, ANIONGAP, BUN, CREATININE, GLUCOSE, CALCIUM, AST, ALT, ALKPHOS, PROT, BILITOT, EGFR, Magnesium: No components found for: MAG, Coags: No results found for: PT, PTT, INR  VTE Pharmacologic Prophylaxis: Heparin  VTE Mechanical Prophylaxis: sequential compression device

## 2021-04-10 LAB
ANION GAP SERPL CALCULATED.3IONS-SCNC: 4 MMOL/L (ref 4–13)
BASOPHILS # BLD AUTO: 0.05 THOUSANDS/ΜL (ref 0–0.1)
BASOPHILS NFR BLD AUTO: 0 % (ref 0–1)
BUN SERPL-MCNC: 14 MG/DL (ref 5–25)
CALCIUM SERPL-MCNC: 8.7 MG/DL (ref 8.3–10.1)
CHLORIDE SERPL-SCNC: 100 MMOL/L (ref 100–108)
CO2 SERPL-SCNC: 30 MMOL/L (ref 21–32)
CREAT SERPL-MCNC: 0.95 MG/DL (ref 0.6–1.3)
EOSINOPHIL # BLD AUTO: 0.41 THOUSAND/ΜL (ref 0–0.61)
EOSINOPHIL NFR BLD AUTO: 3 % (ref 0–6)
ERYTHROCYTE [DISTWIDTH] IN BLOOD BY AUTOMATED COUNT: 13.4 % (ref 11.6–15.1)
GFR SERPL CREATININE-BSD FRML MDRD: 100 ML/MIN/1.73SQ M
GLUCOSE SERPL-MCNC: 161 MG/DL (ref 65–140)
GLUCOSE SERPL-MCNC: 178 MG/DL (ref 65–140)
GLUCOSE SERPL-MCNC: 185 MG/DL (ref 65–140)
GLUCOSE SERPL-MCNC: 202 MG/DL (ref 65–140)
GLUCOSE SERPL-MCNC: 223 MG/DL (ref 65–140)
HCT VFR BLD AUTO: 33.2 % (ref 36.5–49.3)
HGB BLD-MCNC: 10.6 G/DL (ref 12–17)
IMM GRANULOCYTES # BLD AUTO: 0.07 THOUSAND/UL (ref 0–0.2)
IMM GRANULOCYTES NFR BLD AUTO: 1 % (ref 0–2)
LYMPHOCYTES # BLD AUTO: 1.6 THOUSANDS/ΜL (ref 0.6–4.47)
LYMPHOCYTES NFR BLD AUTO: 13 % (ref 14–44)
MCH RBC QN AUTO: 28 PG (ref 26.8–34.3)
MCHC RBC AUTO-ENTMCNC: 31.9 G/DL (ref 31.4–37.4)
MCV RBC AUTO: 88 FL (ref 82–98)
MONOCYTES # BLD AUTO: 0.91 THOUSAND/ΜL (ref 0.17–1.22)
MONOCYTES NFR BLD AUTO: 8 % (ref 4–12)
NEUTROPHILS # BLD AUTO: 9.14 THOUSANDS/ΜL (ref 1.85–7.62)
NEUTS SEG NFR BLD AUTO: 75 % (ref 43–75)
NRBC BLD AUTO-RTO: 0 /100 WBCS
PLATELET # BLD AUTO: 381 THOUSANDS/UL (ref 149–390)
PMV BLD AUTO: 9.3 FL (ref 8.9–12.7)
POTASSIUM SERPL-SCNC: 4.4 MMOL/L (ref 3.5–5.3)
RBC # BLD AUTO: 3.79 MILLION/UL (ref 3.88–5.62)
SODIUM SERPL-SCNC: 134 MMOL/L (ref 136–145)
WBC # BLD AUTO: 12.18 THOUSAND/UL (ref 4.31–10.16)

## 2021-04-10 PROCEDURE — 85025 COMPLETE CBC W/AUTO DIFF WBC: CPT | Performed by: SURGERY

## 2021-04-10 PROCEDURE — 82948 REAGENT STRIP/BLOOD GLUCOSE: CPT

## 2021-04-10 PROCEDURE — 94640 AIRWAY INHALATION TREATMENT: CPT

## 2021-04-10 PROCEDURE — 80048 BASIC METABOLIC PNL TOTAL CA: CPT | Performed by: SURGERY

## 2021-04-10 PROCEDURE — 94760 N-INVAS EAR/PLS OXIMETRY 1: CPT

## 2021-04-10 PROCEDURE — 99024 POSTOP FOLLOW-UP VISIT: CPT | Performed by: SURGERY

## 2021-04-10 RX ORDER — AMOXICILLIN 250 MG
1 CAPSULE ORAL
Status: DISCONTINUED | OUTPATIENT
Start: 2021-04-10 | End: 2021-04-29 | Stop reason: HOSPADM

## 2021-04-10 RX ADMIN — CEFAZOLIN SODIUM 2000 MG: 2 SOLUTION INTRAVENOUS at 21:01

## 2021-04-10 RX ADMIN — CHLORHEXIDINE GLUCONATE 0.12% ORAL RINSE 15 ML: 1.2 LIQUID ORAL at 08:23

## 2021-04-10 RX ADMIN — CEFAZOLIN SODIUM 2000 MG: 2 SOLUTION INTRAVENOUS at 05:52

## 2021-04-10 RX ADMIN — ASPIRIN 81 MG: 81 TABLET, CHEWABLE ORAL at 08:22

## 2021-04-10 RX ADMIN — GABAPENTIN 100 MG: 100 CAPSULE ORAL at 08:22

## 2021-04-10 RX ADMIN — ENOXAPARIN SODIUM 60 MG: 60 INJECTION SUBCUTANEOUS at 08:23

## 2021-04-10 RX ADMIN — GABAPENTIN 100 MG: 100 CAPSULE ORAL at 17:39

## 2021-04-10 RX ADMIN — HYDROMORPHONE HYDROCHLORIDE 1 MG: 1 INJECTION, SOLUTION INTRAMUSCULAR; INTRAVENOUS; SUBCUTANEOUS at 02:39

## 2021-04-10 RX ADMIN — CEFAZOLIN SODIUM 2000 MG: 2 SOLUTION INTRAVENOUS at 13:59

## 2021-04-10 RX ADMIN — IPRATROPIUM BROMIDE 0.5 MG: 0.5 SOLUTION RESPIRATORY (INHALATION) at 07:30

## 2021-04-10 RX ADMIN — HYDROMORPHONE HYDROCHLORIDE 1 MG: 1 INJECTION, SOLUTION INTRAMUSCULAR; INTRAVENOUS; SUBCUTANEOUS at 21:03

## 2021-04-10 RX ADMIN — ENOXAPARIN SODIUM 60 MG: 60 INJECTION SUBCUTANEOUS at 21:11

## 2021-04-10 RX ADMIN — CHLORHEXIDINE GLUCONATE 0.12% ORAL RINSE 15 ML: 1.2 LIQUID ORAL at 21:01

## 2021-04-10 RX ADMIN — ATORVASTATIN CALCIUM 80 MG: 80 TABLET, FILM COATED ORAL at 17:39

## 2021-04-10 RX ADMIN — HYDROMORPHONE HYDROCHLORIDE 1 MG: 1 INJECTION, SOLUTION INTRAMUSCULAR; INTRAVENOUS; SUBCUTANEOUS at 12:07

## 2021-04-10 RX ADMIN — INSULIN LISPRO 8 UNITS: 100 INJECTION, SOLUTION INTRAVENOUS; SUBCUTANEOUS at 11:52

## 2021-04-10 RX ADMIN — INSULIN LISPRO 4 UNITS: 100 INJECTION, SOLUTION INTRAVENOUS; SUBCUTANEOUS at 17:56

## 2021-04-10 RX ADMIN — METHOCARBAMOL TABLETS 750 MG: 750 TABLET, COATED ORAL at 17:39

## 2021-04-10 RX ADMIN — METHOCARBAMOL TABLETS 750 MG: 750 TABLET, COATED ORAL at 05:52

## 2021-04-10 RX ADMIN — LEVALBUTEROL HYDROCHLORIDE 1.25 MG: 1.25 SOLUTION, CONCENTRATE RESPIRATORY (INHALATION) at 07:30

## 2021-04-10 RX ADMIN — LEVALBUTEROL HYDROCHLORIDE 1.25 MG: 1.25 SOLUTION, CONCENTRATE RESPIRATORY (INHALATION) at 01:24

## 2021-04-10 RX ADMIN — BUDESONIDE 0.5 MG: 0.5 INHALANT ORAL at 20:30

## 2021-04-10 RX ADMIN — METHOCARBAMOL TABLETS 750 MG: 750 TABLET, COATED ORAL at 11:51

## 2021-04-10 RX ADMIN — HYDROMORPHONE HYDROCHLORIDE 1 MG: 1 INJECTION, SOLUTION INTRAMUSCULAR; INTRAVENOUS; SUBCUTANEOUS at 17:39

## 2021-04-10 RX ADMIN — TICAGRELOR 90 MG: 90 TABLET ORAL at 08:22

## 2021-04-10 RX ADMIN — IPRATROPIUM BROMIDE 0.5 MG: 0.5 SOLUTION RESPIRATORY (INHALATION) at 20:30

## 2021-04-10 RX ADMIN — ACETAMINOPHEN 975 MG: 325 TABLET ORAL at 13:59

## 2021-04-10 RX ADMIN — INSULIN LISPRO 4 UNITS: 100 INJECTION, SOLUTION INTRAVENOUS; SUBCUTANEOUS at 21:11

## 2021-04-10 RX ADMIN — LEVALBUTEROL HYDROCHLORIDE 1.25 MG: 1.25 SOLUTION, CONCENTRATE RESPIRATORY (INHALATION) at 20:30

## 2021-04-10 RX ADMIN — BUDESONIDE 0.5 MG: 0.5 INHALANT ORAL at 07:30

## 2021-04-10 RX ADMIN — IPRATROPIUM BROMIDE 0.5 MG: 0.5 SOLUTION RESPIRATORY (INHALATION) at 01:24

## 2021-04-10 RX ADMIN — ACETAMINOPHEN 975 MG: 325 TABLET ORAL at 05:52

## 2021-04-10 RX ADMIN — DOCUSATE SODIUM AND SENNOSIDES 1 TABLET: 8.6; 5 TABLET ORAL at 21:26

## 2021-04-10 RX ADMIN — LEVALBUTEROL HYDROCHLORIDE 1.25 MG: 1.25 SOLUTION, CONCENTRATE RESPIRATORY (INHALATION) at 13:49

## 2021-04-10 RX ADMIN — IPRATROPIUM BROMIDE 0.5 MG: 0.5 SOLUTION RESPIRATORY (INHALATION) at 13:49

## 2021-04-10 RX ADMIN — HYDROMORPHONE HYDROCHLORIDE 1 MG: 1 INJECTION, SOLUTION INTRAMUSCULAR; INTRAVENOUS; SUBCUTANEOUS at 05:52

## 2021-04-10 RX ADMIN — INSULIN LISPRO 4 UNITS: 100 INJECTION, SOLUTION INTRAVENOUS; SUBCUTANEOUS at 08:23

## 2021-04-10 RX ADMIN — TICAGRELOR 90 MG: 90 TABLET ORAL at 21:01

## 2021-04-10 RX ADMIN — ACETAMINOPHEN 975 MG: 325 TABLET ORAL at 21:02

## 2021-04-10 RX ADMIN — GABAPENTIN 100 MG: 100 CAPSULE ORAL at 21:01

## 2021-04-10 NOTE — PROGRESS NOTES
Progress Note - General Surgery   Rafa Gonzalez 45 y o  male MRN: [id number]  Unit/Bed#: [facility] 12-46 Encounter: [id number]    Assessment:  43yo M with L thigh soft tissue mass s/p excision and vac placement (4/6/21)    Plan:   Maintain LLE wound VAC, plan to change at bedside tomorrow   Pain control: dilaudid PCA   OOB/ambulate   DVT PPx    Subjective/Objective     Subjective:   VAC alarming overnight, but currently functioning well  Pain has been controlled with dilaudid PCA  Objective:    Blood pressure 139/67, pulse 76, temperature (!) 97 4 °F (36 3 °C), temperature source Oral, resp  rate 18, height 5' 6 75" (1 695 m), weight (!) 167 kg (367 lb 11 6 oz), SpO2 95 %  ,Body mass index is 58 03 kg/m²        Intake/Output Summary (Last 24 hours) at 4/10/2021 0753  Last data filed at 4/10/2021 0732  Gross per 24 hour   Intake 2063 53 ml   Output 3930 ml   Net -1866 47 ml       Invasive Devices     Peripheral Intravenous Line            Peripheral IV 04/08/21 Left Antecubital 1 day          Drain            Negative Pressure Wound Therapy (V A C ) Other (Comment) 3 days                Physical Exam:   Gen:  NAD  CV:  warm, well-perfused  Lungs: mildly increased WOB on 4L NC/NRB  Abd:  soft, NT/ND  Ext:  LLE VAC holding good suction at 125 mmHg  Neuro: A&Ox3, M/S intact    Results from last 7 days   Lab Units 04/10/21  0247 04/09/21  0854 04/08/21  0446   WBC Thousand/uL 12 18* 13 56* 13 88*   HEMOGLOBIN g/dL 10 6* 10 1* 10 2*   HEMATOCRIT % 33 2* 31 4* 32 3*   PLATELETS Thousands/uL 381 350 347     Results from last 7 days   Lab Units 04/10/21  0247 04/09/21  0854 04/08/21  0446   POTASSIUM mmol/L 4 4 4 2 4 3   CHLORIDE mmol/L 100 101 102   CO2 mmol/L 30 29 29   BUN mg/dL 14 13 18   CREATININE mg/dL 0 95 1 04 1 12   CALCIUM mg/dL 8 7 8 5 8 3

## 2021-04-10 NOTE — NURSING NOTE
Messaged Kush Wheeler via [employer] about VAC leak,  He stated will look shortly during rounds

## 2021-04-11 LAB
ANION GAP SERPL CALCULATED.3IONS-SCNC: 3 MMOL/L (ref 4–13)
BASOPHILS # BLD AUTO: 0.05 THOUSANDS/ΜL (ref 0–0.1)
BASOPHILS NFR BLD AUTO: 1 % (ref 0–1)
BUN SERPL-MCNC: 11 MG/DL (ref 5–25)
CALCIUM SERPL-MCNC: 9.1 MG/DL (ref 8.3–10.1)
CHLORIDE SERPL-SCNC: 100 MMOL/L (ref 100–108)
CO2 SERPL-SCNC: 33 MMOL/L (ref 21–32)
CREAT SERPL-MCNC: 0.95 MG/DL (ref 0.6–1.3)
EOSINOPHIL # BLD AUTO: 0.4 THOUSAND/ΜL (ref 0–0.61)
EOSINOPHIL NFR BLD AUTO: 4 % (ref 0–6)
ERYTHROCYTE [DISTWIDTH] IN BLOOD BY AUTOMATED COUNT: 13.4 % (ref 11.6–15.1)
GFR SERPL CREATININE-BSD FRML MDRD: 100 ML/MIN/1.73SQ M
GLUCOSE SERPL-MCNC: 139 MG/DL (ref 65–140)
GLUCOSE SERPL-MCNC: 149 MG/DL (ref 65–140)
GLUCOSE SERPL-MCNC: 173 MG/DL (ref 65–140)
GLUCOSE SERPL-MCNC: 237 MG/DL (ref 65–140)
GLUCOSE SERPL-MCNC: 239 MG/DL (ref 65–140)
HCT VFR BLD AUTO: 31.5 % (ref 36.5–49.3)
HGB BLD-MCNC: 10.1 G/DL (ref 12–17)
IMM GRANULOCYTES # BLD AUTO: 0.09 THOUSAND/UL (ref 0–0.2)
IMM GRANULOCYTES NFR BLD AUTO: 1 % (ref 0–2)
LYMPHOCYTES # BLD AUTO: 1.88 THOUSANDS/ΜL (ref 0.6–4.47)
LYMPHOCYTES NFR BLD AUTO: 18 % (ref 14–44)
MCH RBC QN AUTO: 28.2 PG (ref 26.8–34.3)
MCHC RBC AUTO-ENTMCNC: 32.1 G/DL (ref 31.4–37.4)
MCV RBC AUTO: 88 FL (ref 82–98)
MONOCYTES # BLD AUTO: 0.87 THOUSAND/ΜL (ref 0.17–1.22)
MONOCYTES NFR BLD AUTO: 8 % (ref 4–12)
NEUTROPHILS # BLD AUTO: 7.32 THOUSANDS/ΜL (ref 1.85–7.62)
NEUTS SEG NFR BLD AUTO: 68 % (ref 43–75)
NRBC BLD AUTO-RTO: 0 /100 WBCS
PLATELET # BLD AUTO: 400 THOUSANDS/UL (ref 149–390)
PMV BLD AUTO: 9.4 FL (ref 8.9–12.7)
POTASSIUM SERPL-SCNC: 4 MMOL/L (ref 3.5–5.3)
RBC # BLD AUTO: 3.58 MILLION/UL (ref 3.88–5.62)
SODIUM SERPL-SCNC: 136 MMOL/L (ref 136–145)
WBC # BLD AUTO: 10.61 THOUSAND/UL (ref 4.31–10.16)

## 2021-04-11 PROCEDURE — 94760 N-INVAS EAR/PLS OXIMETRY 1: CPT

## 2021-04-11 PROCEDURE — 82948 REAGENT STRIP/BLOOD GLUCOSE: CPT

## 2021-04-11 PROCEDURE — 99024 POSTOP FOLLOW-UP VISIT: CPT | Performed by: SURGERY

## 2021-04-11 PROCEDURE — 94640 AIRWAY INHALATION TREATMENT: CPT

## 2021-04-11 PROCEDURE — NC001 PR NO CHARGE: Performed by: SURGERY

## 2021-04-11 PROCEDURE — 80048 BASIC METABOLIC PNL TOTAL CA: CPT | Performed by: STUDENT IN AN ORGANIZED HEALTH CARE EDUCATION/TRAINING PROGRAM

## 2021-04-11 PROCEDURE — 85025 COMPLETE CBC W/AUTO DIFF WBC: CPT | Performed by: STUDENT IN AN ORGANIZED HEALTH CARE EDUCATION/TRAINING PROGRAM

## 2021-04-11 PROCEDURE — 2W0MX6Z CHANGE PRESSURE DRESSING ON LEFT LOWER EXTREMITY: ICD-10-PCS | Performed by: SURGERY

## 2021-04-11 RX ORDER — LIDOCAINE HYDROCHLORIDE 10 MG/ML
INJECTION, SOLUTION EPIDURAL; INFILTRATION; INTRACAUDAL; PERINEURAL
Status: COMPLETED
Start: 2021-04-11 | End: 2021-04-11

## 2021-04-11 RX ORDER — OXYCODONE HYDROCHLORIDE 5 MG/1
5 TABLET ORAL ONCE
Status: COMPLETED | OUTPATIENT
Start: 2021-04-11 | End: 2021-04-11

## 2021-04-11 RX ORDER — LORAZEPAM 2 MG/ML
0.5 INJECTION INTRAMUSCULAR ONCE
Status: COMPLETED | OUTPATIENT
Start: 2021-04-11 | End: 2021-04-11

## 2021-04-11 RX ADMIN — CEFAZOLIN SODIUM 2000 MG: 2 SOLUTION INTRAVENOUS at 06:22

## 2021-04-11 RX ADMIN — HYDROMORPHONE HYDROCHLORIDE 1 MG: 1 INJECTION, SOLUTION INTRAMUSCULAR; INTRAVENOUS; SUBCUTANEOUS at 00:19

## 2021-04-11 RX ADMIN — METHOCARBAMOL TABLETS 750 MG: 750 TABLET, COATED ORAL at 06:22

## 2021-04-11 RX ADMIN — IPRATROPIUM BROMIDE 0.5 MG: 0.5 SOLUTION RESPIRATORY (INHALATION) at 01:44

## 2021-04-11 RX ADMIN — ACETAMINOPHEN 975 MG: 325 TABLET ORAL at 21:48

## 2021-04-11 RX ADMIN — ACETAMINOPHEN 975 MG: 325 TABLET ORAL at 06:22

## 2021-04-11 RX ADMIN — HYDROMORPHONE HYDROCHLORIDE 1 MG: 1 INJECTION, SOLUTION INTRAMUSCULAR; INTRAVENOUS; SUBCUTANEOUS at 21:48

## 2021-04-11 RX ADMIN — INSULIN LISPRO 8 UNITS: 100 INJECTION, SOLUTION INTRAVENOUS; SUBCUTANEOUS at 11:42

## 2021-04-11 RX ADMIN — HYDROMORPHONE HYDROCHLORIDE 1 MG: 1 INJECTION, SOLUTION INTRAMUSCULAR; INTRAVENOUS; SUBCUTANEOUS at 09:34

## 2021-04-11 RX ADMIN — DOCUSATE SODIUM AND SENNOSIDES 1 TABLET: 8.6; 5 TABLET ORAL at 21:48

## 2021-04-11 RX ADMIN — GABAPENTIN 100 MG: 100 CAPSULE ORAL at 18:16

## 2021-04-11 RX ADMIN — IPRATROPIUM BROMIDE 0.5 MG: 0.5 SOLUTION RESPIRATORY (INHALATION) at 07:43

## 2021-04-11 RX ADMIN — METHOCARBAMOL TABLETS 750 MG: 750 TABLET, COATED ORAL at 18:16

## 2021-04-11 RX ADMIN — BUDESONIDE 0.5 MG: 0.5 INHALANT ORAL at 20:01

## 2021-04-11 RX ADMIN — CHLORHEXIDINE GLUCONATE 0.12% ORAL RINSE 15 ML: 1.2 LIQUID ORAL at 08:48

## 2021-04-11 RX ADMIN — BUDESONIDE 0.5 MG: 0.5 INHALANT ORAL at 07:43

## 2021-04-11 RX ADMIN — ASPIRIN 81 MG: 81 TABLET, CHEWABLE ORAL at 08:48

## 2021-04-11 RX ADMIN — LEVALBUTEROL HYDROCHLORIDE 1.25 MG: 1.25 SOLUTION, CONCENTRATE RESPIRATORY (INHALATION) at 13:50

## 2021-04-11 RX ADMIN — HYDROMORPHONE HYDROCHLORIDE 1 MG: 1 INJECTION, SOLUTION INTRAMUSCULAR; INTRAVENOUS; SUBCUTANEOUS at 13:28

## 2021-04-11 RX ADMIN — CHLORHEXIDINE GLUCONATE 0.12% ORAL RINSE 15 ML: 1.2 LIQUID ORAL at 21:48

## 2021-04-11 RX ADMIN — ENOXAPARIN SODIUM 60 MG: 60 INJECTION SUBCUTANEOUS at 21:53

## 2021-04-11 RX ADMIN — METHOCARBAMOL TABLETS 750 MG: 750 TABLET, COATED ORAL at 11:42

## 2021-04-11 RX ADMIN — HYDROMORPHONE HYDROCHLORIDE 1 MG: 1 INJECTION, SOLUTION INTRAMUSCULAR; INTRAVENOUS; SUBCUTANEOUS at 18:16

## 2021-04-11 RX ADMIN — IPRATROPIUM BROMIDE 0.5 MG: 0.5 SOLUTION RESPIRATORY (INHALATION) at 13:50

## 2021-04-11 RX ADMIN — CEFAZOLIN SODIUM 2000 MG: 2 SOLUTION INTRAVENOUS at 21:50

## 2021-04-11 RX ADMIN — ATORVASTATIN CALCIUM 80 MG: 80 TABLET, FILM COATED ORAL at 18:16

## 2021-04-11 RX ADMIN — HYDROMORPHONE HYDROCHLORIDE 1 MG: 1 INJECTION, SOLUTION INTRAMUSCULAR; INTRAVENOUS; SUBCUTANEOUS at 06:22

## 2021-04-11 RX ADMIN — GABAPENTIN 100 MG: 100 CAPSULE ORAL at 21:48

## 2021-04-11 RX ADMIN — LEVALBUTEROL HYDROCHLORIDE 1.25 MG: 1.25 SOLUTION, CONCENTRATE RESPIRATORY (INHALATION) at 07:43

## 2021-04-11 RX ADMIN — INSULIN LISPRO 8 UNITS: 100 INJECTION, SOLUTION INTRAVENOUS; SUBCUTANEOUS at 21:53

## 2021-04-11 RX ADMIN — LORAZEPAM 0.5 MG: 2 INJECTION INTRAMUSCULAR; INTRAVENOUS at 09:34

## 2021-04-11 RX ADMIN — METHOCARBAMOL TABLETS 750 MG: 750 TABLET, COATED ORAL at 00:19

## 2021-04-11 RX ADMIN — HYDROMORPHONE HYDROCHLORIDE 1 MG: 1 INJECTION, SOLUTION INTRAMUSCULAR; INTRAVENOUS; SUBCUTANEOUS at 09:59

## 2021-04-11 RX ADMIN — HYDROMORPHONE HYDROCHLORIDE 1 MG: 1 INJECTION, SOLUTION INTRAMUSCULAR; INTRAVENOUS; SUBCUTANEOUS at 03:21

## 2021-04-11 RX ADMIN — IPRATROPIUM BROMIDE 0.5 MG: 0.5 SOLUTION RESPIRATORY (INHALATION) at 20:01

## 2021-04-11 RX ADMIN — LEVALBUTEROL HYDROCHLORIDE 1.25 MG: 1.25 SOLUTION, CONCENTRATE RESPIRATORY (INHALATION) at 20:01

## 2021-04-11 RX ADMIN — CEFAZOLIN SODIUM 2000 MG: 2 SOLUTION INTRAVENOUS at 13:39

## 2021-04-11 RX ADMIN — LIDOCAINE HYDROCHLORIDE 50 MG: 10 INJECTION, SOLUTION EPIDURAL; INFILTRATION; INTRACAUDAL; PERINEURAL at 09:48

## 2021-04-11 RX ADMIN — GABAPENTIN 100 MG: 100 CAPSULE ORAL at 08:48

## 2021-04-11 RX ADMIN — ENOXAPARIN SODIUM 60 MG: 60 INJECTION SUBCUTANEOUS at 08:49

## 2021-04-11 RX ADMIN — ACETAMINOPHEN 975 MG: 325 TABLET ORAL at 13:28

## 2021-04-11 RX ADMIN — LEVALBUTEROL HYDROCHLORIDE 1.25 MG: 1.25 SOLUTION, CONCENTRATE RESPIRATORY (INHALATION) at 01:44

## 2021-04-11 RX ADMIN — TICAGRELOR 90 MG: 90 TABLET ORAL at 21:48

## 2021-04-11 RX ADMIN — OXYCODONE HYDROCHLORIDE 5 MG: 5 TABLET ORAL at 09:21

## 2021-04-11 RX ADMIN — TICAGRELOR 90 MG: 90 TABLET ORAL at 08:48

## 2021-04-11 RX ADMIN — INSULIN LISPRO 4 UNITS: 100 INJECTION, SOLUTION INTRAVENOUS; SUBCUTANEOUS at 18:17

## 2021-04-11 NOTE — PLAN OF CARE
Problem: Prexisting or High Potential for Compromised Skin Integrity  Goal: Skin integrity is maintained or improved  Description: INTERVENTIONS:  - Identify patients at risk for skin breakdown  - Assess and monitor skin integrity  - Assess and monitor nutrition and hydration status  - Monitor labs   - Assess for incontinence   - Turn and reposition patient  - Assist with mobility/ambulation  - Relieve pressure over bony prominences  - Avoid friction and shearing  - Provide appropriate hygiene as needed including keeping skin clean and dry  - Evaluate need for skin moisturizer/barrier cream  - Collaborate with interdisciplinary team   - Patient/family teaching  - Consider wound care consult   Outcome: Progressing     Problem: SAFETY,RESTRAINT: NV/NON-SELF DESTRUCTIVE BEHAVIOR  Goal: Remains free of harm/injury (restraint for non violent/non self-detsructive behavior)  Description: INTERVENTIONS:  - Instruct patient/family regarding restraint use   - Assess and monitor physiologic and psychological status   - Provide interventions and comfort measures to meet assessed patient needs   - Identify and implement measures to help patient regain control  - Assess readiness for release of restraint   Outcome: Progressing  Goal: Returns to optimal restraint-free functioning  Description: INTERVENTIONS:  - Assess the patient's behavior and symptoms that indicate continued need for restraint  - Identify and implement measures to help patient regain control  - Assess readiness for release of restraint   Outcome: Progressing     Problem: PAIN - ADULT  Goal: Verbalizes/displays adequate comfort level or baseline comfort level  Description: Interventions:  - Encourage patient to monitor pain and request assistance  - Assess pain using appropriate pain scale  - Administer analgesics based on type and severity of pain and evaluate response  - Implement non-pharmacological measures as appropriate and evaluate response  - Consider cultural and social influences on pain and pain management  - Notify physician/advanced practitioner if interventions unsuccessful or patient reports new pain  Outcome: Progressing     Problem: INFECTION - ADULT  Goal: Absence or prevention of progression during hospitalization  Description: INTERVENTIONS:  - Assess and monitor for signs and symptoms of infection  - Monitor lab/diagnostic results  - Monitor all insertion sites, i e  indwelling lines, tubes, and drains  - Monitor endotracheal if appropriate and nasal secretions for changes in amount and color  - [place] appropriate cooling/warming therapies per order  - Administer medications as ordered  - Instruct and encourage patient and family to use good hand hygiene technique  - Identify and instruct in appropriate isolation precautions for identified infection/condition  Outcome: Progressing     Problem: SAFETY ADULT  Goal: Patient will remain free of falls  Description: INTERVENTIONS:  - Assess patient frequently for physical needs  -  Identify cognitive and physical deficits and behaviors that affect risk of falls    -  [place] fall precautions as indicated by assessment   - Educate patient/family on patient safety including physical limitations  - Instruct patient to call for assistance with activity based on assessment  - Modify environment to reduce risk of injury  - Consider OT/PT consult to assist with strengthening/mobility  Outcome: Progressing  Goal: Maintain or return to baseline ADL function  Description: INTERVENTIONS:  -  Assess patient's ability to carry out ADLs; assess patient's baseline for ADL function and identify physical deficits which impact ability to perform ADLs (bathing, care of mouth/teeth, toileting, grooming, dressing, etc )  - Assess/evaluate cause of self-care deficits   - Assess range of motion  - Assess patient's mobility; develop plan if impaired  - Assess patient's need for assistive devices and provide as appropriate  - Encourage maximum independence but intervene and supervise when necessary  - Involve family in performance of ADLs  - Assess for home care needs following discharge   - Consider OT consult to assist with ADL evaluation and planning for discharge  - Provide patient education as appropriate  Outcome: Progressing  Goal: Maintain or return mobility status to optimal level  Description: INTERVENTIONS:  - Assess patient's baseline mobility status (ambulation, transfers, stairs, etc )    - Identify cognitive and physical deficits and behaviors that affect mobility  - Identify mobility aids required to assist with transfers and/or ambulation (gait belt, sit-to-stand, lift, walker, cane, etc )  - [place] fall precautions as indicated by assessment  - Record patient progress and toleration of activity level on Mobility SBAR; progress patient to next Phase/Stage  - Instruct patient to call for assistance with activity based on assessment  - Consider rehabilitation consult to assist with strengthening/weightbearing, etc   Outcome: Progressing     Problem: DISCHARGE PLANNING  Goal: Discharge to home or other facility with appropriate resources  Description: INTERVENTIONS:  - Identify barriers to discharge w/patient and caregiver  - Arrange for needed discharge resources and transportation as appropriate  - Identify discharge learning needs (meds, wound care, etc )  - Arrange for interpretive services to assist at discharge as needed  - Refer to Case Management Department for coordinating discharge planning if the patient needs post-hospital services based on physician/advanced practitioner order or complex needs related to functional status, cognitive ability, or social support system  Outcome: Progressing     Problem: Knowledge Deficit  Goal: Patient/family/caregiver demonstrates understanding of disease process, treatment plan, medications, and discharge instructions  Description: Complete learning assessment and assess knowledge base  Interventions:  - Provide teaching at level of understanding  - Provide teaching via preferred learning methods  Outcome: Progressing     Problem: CARDIOVASCULAR - ADULT  Goal: Maintains optimal cardiac output and hemodynamic stability  Description: INTERVENTIONS:  - Monitor I/O, vital signs and rhythm  - Monitor for S/S and trends of decreased cardiac output  - Administer and titrate ordered vasoactive medications to optimize hemodynamic stability  - Assess quality of pulses, skin color and temperature  - Assess for signs of decreased coronary artery perfusion  - Instruct patient to report change in severity of symptoms  Outcome: Progressing  Goal: Absence of cardiac dysrhythmias or at baseline rhythm  Description: INTERVENTIONS:  - Continuous cardiac monitoring, vital signs, obtain 12 lead EKG if ordered  - Administer antiarrhythmic and heart rate control medications as ordered  - Monitor electrolytes and administer replacement therapy as ordered  Outcome: Progressing     Problem: RESPIRATORY - ADULT  Goal: Achieves optimal ventilation and oxygenation  Description: INTERVENTIONS:  - Assess for changes in respiratory status  - Assess for changes in mentation and behavior  - Position to facilitate oxygenation and minimize respiratory effort  - Oxygen administered by appropriate delivery if ordered  - Initiate smoking cessation education as indicated  - Encourage broncho-pulmonary hygiene including cough, deep breathe, Incentive Spirometry  - Assess the need for suctioning and aspirate as needed  - Assess and instruct to report SOB or any respiratory difficulty  - Respiratory Therapy support as indicated  Outcome: Progressing     Problem: Potential for Falls  Goal: Patient will remain free of falls  Description: INTERVENTIONS:  - Assess patient frequently for physical needs  -  Identify cognitive and physical deficits and behaviors that affect risk of falls    -  [place] fall precautions as indicated by assessment   - Educate patient/family on patient safety including physical limitations  - Instruct patient to call for assistance with activity based on assessment  - Modify environment to reduce risk of injury  - Consider OT/PT consult to assist with strengthening/mobility  Outcome: Progressing     Problem: Nutrition/Hydration-ADULT  Goal: Nutrient/Hydration intake appropriate for improving, restoring or maintaining nutritional needs  Description: Monitor and assess patient's nutrition/hydration status for malnutrition  Collaborate with interdisciplinary team and initiate plan and interventions as ordered  Monitor patient's weight and dietary intake as ordered or per policy  Utilize nutrition screening tool and intervene as necessary  Determine patient's food preferences and provide high-protein, high-caloric foods as appropriate       INTERVENTIONS:  - Monitor oral intake, urinary output, labs, and treatment plans  - Assess nutrition and hydration status and recommend course of action  - Evaluate amount of meals eaten  - Assist patient with eating if necessary   - Allow adequate time for meals  - Recommend/ encourage appropriate diets, oral nutritional supplements, and vitamin/mineral supplements  - Order, calculate, and assess calorie counts as needed  - Recommend, monitor, and adjust tube feedings and TPN/PPN based on assessed needs  - Assess need for intravenous fluids  - Provide specific nutrition/hydration education as appropriate  - Include patient/family/caregiver in decisions related to nutrition  Outcome: Progressing

## 2021-04-11 NOTE — PROGRESS NOTES
Progress Note - General Surgery   Josefa Mitchell 45 y o  male MRN: [id number]  Unit/Bed#: [facility] 12-46 Encounter: [id number]    Assessment:  43yo M with L thigh soft tissue mass s/p excision and vac placement (4/6/21)    On 3 L oxygen    Plan:   Maintain LLE wound VAC, plan to change at bedside today   Pain control: dilaudid PCA   OOB/ambulate   DVT PPx    Subjective/Objective     Subjective:   VAC currently functioning well  Pain has been controlled with dilaudid PCA  Objective:    Blood pressure 163/78, pulse 76, temperature 97 9 °F (36 6 °C), resp  rate 20, height 5' 6 75" (1 695 m), weight (!) 165 kg (364 lb 3 2 oz), SpO2 93 %  ,Body mass index is 57 47 kg/m²        Intake/Output Summary (Last 24 hours) at 4/11/2021 8550  Last data filed at 4/11/2021 9519  Gross per 24 hour   Intake 2007 9 ml   Output 4250 ml   Net -2242 1 ml       Invasive Devices     Peripheral Intravenous Line            Peripheral IV 04/08/21 Left Antecubital 2 days          Drain            Negative Pressure Wound Therapy (V A C ) Other (Comment) 4 days              Physical Exam:   GENERAL APPEARANCE: in no acute distress  NEURO: stable  HEENT: normocephalic, atraumatic   CV: regular rate and rhythm, no tachycardia,   LUNGS: clear to auscultation bilaterally, on 3 L oxygen  GI: soft, nontender, nondistended  : no abnormality detected  MSK: no abnormality detected, VAC at 125 mm Hg to suction working well  serosang output  SKIN: no abnormality detected      Results from last 7 days   Lab Units 04/11/21  0436 04/10/21  0247 04/09/21  0854   WBC Thousand/uL 10 61* 12 18* 13 56*   HEMOGLOBIN g/dL 10 1* 10 6* 10 1*   HEMATOCRIT % 31 5* 33 2* 31 4*   PLATELETS Thousands/uL 400* 381 350     Results from last 7 days   Lab Units 04/11/21  0436 04/10/21  0247 04/09/21  0854   POTASSIUM mmol/L 4 0 4 4 4 2   CHLORIDE mmol/L 100 100 101   CO2 mmol/L 33* 30 29   BUN mg/dL 11 14 13   CREATININE mg/dL 0 95 0 95 1 04   CALCIUM mg/dL 9 1 8 7 8 5

## 2021-04-11 NOTE — QUICK NOTE
The patient was seen and given oxycodone, ativan and dilaudid as premedication  The VAC dressing was removed at bedside  Topical lidocaine was applied over the wound  The wound was irrigated and then was packed with 4 pieces of adaptic+ a black foam dressing (with 2 pieces of black foam)     and 1 piece of xeroform placed over the sutured portion of the wound  A suction dressing was applied  The suction was set to 125 mm Hg and the VAC was working well  The patient tolerated the dressing change well

## 2021-04-12 LAB
ANION GAP SERPL CALCULATED.3IONS-SCNC: 5 MMOL/L (ref 4–13)
BASOPHILS # BLD AUTO: 0.07 THOUSANDS/ΜL (ref 0–0.1)
BASOPHILS NFR BLD AUTO: 1 % (ref 0–1)
BUN SERPL-MCNC: 12 MG/DL (ref 5–25)
CALCIUM SERPL-MCNC: 9.2 MG/DL (ref 8.3–10.1)
CHLORIDE SERPL-SCNC: 98 MMOL/L (ref 100–108)
CO2 SERPL-SCNC: 31 MMOL/L (ref 21–32)
CREAT SERPL-MCNC: 1 MG/DL (ref 0.6–1.3)
EOSINOPHIL # BLD AUTO: 0.4 THOUSAND/ΜL (ref 0–0.61)
EOSINOPHIL NFR BLD AUTO: 4 % (ref 0–6)
ERYTHROCYTE [DISTWIDTH] IN BLOOD BY AUTOMATED COUNT: 13.4 % (ref 11.6–15.1)
GFR SERPL CREATININE-BSD FRML MDRD: 94 ML/MIN/1.73SQ M
GLUCOSE SERPL-MCNC: 133 MG/DL (ref 65–140)
GLUCOSE SERPL-MCNC: 152 MG/DL (ref 65–140)
GLUCOSE SERPL-MCNC: 174 MG/DL (ref 65–140)
GLUCOSE SERPL-MCNC: 174 MG/DL (ref 65–140)
GLUCOSE SERPL-MCNC: 200 MG/DL (ref 65–140)
HCT VFR BLD AUTO: 33.1 % (ref 36.5–49.3)
HGB BLD-MCNC: 10.4 G/DL (ref 12–17)
IMM GRANULOCYTES # BLD AUTO: 0.12 THOUSAND/UL (ref 0–0.2)
IMM GRANULOCYTES NFR BLD AUTO: 1 % (ref 0–2)
LYMPHOCYTES # BLD AUTO: 1.85 THOUSANDS/ΜL (ref 0.6–4.47)
LYMPHOCYTES NFR BLD AUTO: 17 % (ref 14–44)
MCH RBC QN AUTO: 27.7 PG (ref 26.8–34.3)
MCHC RBC AUTO-ENTMCNC: 31.4 G/DL (ref 31.4–37.4)
MCV RBC AUTO: 88 FL (ref 82–98)
MONOCYTES # BLD AUTO: 0.96 THOUSAND/ΜL (ref 0.17–1.22)
MONOCYTES NFR BLD AUTO: 9 % (ref 4–12)
NEUTROPHILS # BLD AUTO: 7.68 THOUSANDS/ΜL (ref 1.85–7.62)
NEUTS SEG NFR BLD AUTO: 68 % (ref 43–75)
NRBC BLD AUTO-RTO: 0 /100 WBCS
PLATELET # BLD AUTO: 431 THOUSANDS/UL (ref 149–390)
PMV BLD AUTO: 9.1 FL (ref 8.9–12.7)
POTASSIUM SERPL-SCNC: 4 MMOL/L (ref 3.5–5.3)
RBC # BLD AUTO: 3.75 MILLION/UL (ref 3.88–5.62)
SODIUM SERPL-SCNC: 134 MMOL/L (ref 136–145)
WBC # BLD AUTO: 11.08 THOUSAND/UL (ref 4.31–10.16)

## 2021-04-12 PROCEDURE — 97116 GAIT TRAINING THERAPY: CPT

## 2021-04-12 PROCEDURE — 80048 BASIC METABOLIC PNL TOTAL CA: CPT | Performed by: STUDENT IN AN ORGANIZED HEALTH CARE EDUCATION/TRAINING PROGRAM

## 2021-04-12 PROCEDURE — NC001 PR NO CHARGE: Performed by: SURGERY

## 2021-04-12 PROCEDURE — 94760 N-INVAS EAR/PLS OXIMETRY 1: CPT

## 2021-04-12 PROCEDURE — 94640 AIRWAY INHALATION TREATMENT: CPT

## 2021-04-12 PROCEDURE — 85025 COMPLETE CBC W/AUTO DIFF WBC: CPT | Performed by: STUDENT IN AN ORGANIZED HEALTH CARE EDUCATION/TRAINING PROGRAM

## 2021-04-12 PROCEDURE — 82948 REAGENT STRIP/BLOOD GLUCOSE: CPT

## 2021-04-12 RX ORDER — ECHINACEA PURPUREA EXTRACT 125 MG
1 TABLET ORAL
Status: DISCONTINUED | OUTPATIENT
Start: 2021-04-12 | End: 2021-04-29 | Stop reason: HOSPADM

## 2021-04-12 RX ORDER — OXYCODONE HYDROCHLORIDE 5 MG/1
5 TABLET ORAL EVERY 4 HOURS PRN
Status: DISCONTINUED | OUTPATIENT
Start: 2021-04-12 | End: 2021-04-13

## 2021-04-12 RX ORDER — OXYCODONE HYDROCHLORIDE 10 MG/1
10 TABLET ORAL EVERY 4 HOURS PRN
Status: DISCONTINUED | OUTPATIENT
Start: 2021-04-12 | End: 2021-04-13

## 2021-04-12 RX ORDER — SODIUM CHLORIDE 1000 MG
1 TABLET, SOLUBLE MISCELLANEOUS
Status: COMPLETED | OUTPATIENT
Start: 2021-04-12 | End: 2021-04-12

## 2021-04-12 RX ADMIN — Medication 1 G: at 08:29

## 2021-04-12 RX ADMIN — METHOCARBAMOL TABLETS 750 MG: 750 TABLET, COATED ORAL at 05:32

## 2021-04-12 RX ADMIN — ASPIRIN 81 MG: 81 TABLET, CHEWABLE ORAL at 08:29

## 2021-04-12 RX ADMIN — ENOXAPARIN SODIUM 60 MG: 60 INJECTION SUBCUTANEOUS at 08:41

## 2021-04-12 RX ADMIN — IPRATROPIUM BROMIDE 0.5 MG: 0.5 SOLUTION RESPIRATORY (INHALATION) at 01:37

## 2021-04-12 RX ADMIN — ACETAMINOPHEN 975 MG: 325 TABLET ORAL at 05:32

## 2021-04-12 RX ADMIN — METHOCARBAMOL TABLETS 750 MG: 750 TABLET, COATED ORAL at 12:09

## 2021-04-12 RX ADMIN — GABAPENTIN 100 MG: 100 CAPSULE ORAL at 16:43

## 2021-04-12 RX ADMIN — INSULIN LISPRO 4 UNITS: 100 INJECTION, SOLUTION INTRAVENOUS; SUBCUTANEOUS at 11:59

## 2021-04-12 RX ADMIN — CEFAZOLIN SODIUM 2000 MG: 2 SOLUTION INTRAVENOUS at 13:45

## 2021-04-12 RX ADMIN — METHOCARBAMOL TABLETS 750 MG: 750 TABLET, COATED ORAL at 17:04

## 2021-04-12 RX ADMIN — TICAGRELOR 90 MG: 90 TABLET ORAL at 21:54

## 2021-04-12 RX ADMIN — CHLORHEXIDINE GLUCONATE 0.12% ORAL RINSE 15 ML: 1.2 LIQUID ORAL at 21:54

## 2021-04-12 RX ADMIN — CHLORHEXIDINE GLUCONATE 0.12% ORAL RINSE 15 ML: 1.2 LIQUID ORAL at 08:29

## 2021-04-12 RX ADMIN — INSULIN LISPRO 4 UNITS: 100 INJECTION, SOLUTION INTRAVENOUS; SUBCUTANEOUS at 06:29

## 2021-04-12 RX ADMIN — TICAGRELOR 90 MG: 90 TABLET ORAL at 08:30

## 2021-04-12 RX ADMIN — CEFAZOLIN SODIUM 2000 MG: 2 SOLUTION INTRAVENOUS at 21:54

## 2021-04-12 RX ADMIN — HYDROMORPHONE HYDROCHLORIDE 1 MG: 1 INJECTION, SOLUTION INTRAMUSCULAR; INTRAVENOUS; SUBCUTANEOUS at 21:55

## 2021-04-12 RX ADMIN — GABAPENTIN 100 MG: 100 CAPSULE ORAL at 21:54

## 2021-04-12 RX ADMIN — ENOXAPARIN SODIUM 60 MG: 60 INJECTION SUBCUTANEOUS at 21:55

## 2021-04-12 RX ADMIN — HYDROMORPHONE HYDROCHLORIDE 1 MG: 1 INJECTION, SOLUTION INTRAMUSCULAR; INTRAVENOUS; SUBCUTANEOUS at 10:46

## 2021-04-12 RX ADMIN — OXYCODONE HYDROCHLORIDE 10 MG: 10 TABLET ORAL at 15:38

## 2021-04-12 RX ADMIN — INSULIN LISPRO 4 UNITS: 100 INJECTION, SOLUTION INTRAVENOUS; SUBCUTANEOUS at 21:55

## 2021-04-12 RX ADMIN — ACETAMINOPHEN 975 MG: 325 TABLET ORAL at 21:54

## 2021-04-12 RX ADMIN — OXYCODONE HYDROCHLORIDE 10 MG: 10 TABLET ORAL at 19:38

## 2021-04-12 RX ADMIN — GABAPENTIN 100 MG: 100 CAPSULE ORAL at 08:29

## 2021-04-12 RX ADMIN — HYDROMORPHONE HYDROCHLORIDE 1 MG: 1 INJECTION, SOLUTION INTRAMUSCULAR; INTRAVENOUS; SUBCUTANEOUS at 00:56

## 2021-04-12 RX ADMIN — LEVALBUTEROL HYDROCHLORIDE 1.25 MG: 1.25 SOLUTION, CONCENTRATE RESPIRATORY (INHALATION) at 01:37

## 2021-04-12 RX ADMIN — HYDROMORPHONE HYDROCHLORIDE 1 MG: 1 INJECTION, SOLUTION INTRAMUSCULAR; INTRAVENOUS; SUBCUTANEOUS at 16:32

## 2021-04-12 RX ADMIN — ACETAMINOPHEN 975 MG: 325 TABLET ORAL at 13:45

## 2021-04-12 RX ADMIN — CEFAZOLIN SODIUM 2000 MG: 2 SOLUTION INTRAVENOUS at 05:32

## 2021-04-12 RX ADMIN — ATORVASTATIN CALCIUM 80 MG: 80 TABLET, FILM COATED ORAL at 16:43

## 2021-04-12 RX ADMIN — HYDROMORPHONE HYDROCHLORIDE 1 MG: 1 INJECTION, SOLUTION INTRAMUSCULAR; INTRAVENOUS; SUBCUTANEOUS at 04:38

## 2021-04-12 RX ADMIN — OXYCODONE HYDROCHLORIDE 10 MG: 10 TABLET ORAL at 08:46

## 2021-04-12 RX ADMIN — METHOCARBAMOL TABLETS 750 MG: 750 TABLET, COATED ORAL at 00:02

## 2021-04-12 RX ADMIN — Medication: at 02:13

## 2021-04-12 RX ADMIN — DOCUSATE SODIUM AND SENNOSIDES 1 TABLET: 8.6; 5 TABLET ORAL at 21:54

## 2021-04-12 RX ADMIN — METHOCARBAMOL TABLETS 750 MG: 750 TABLET, COATED ORAL at 23:06

## 2021-04-12 NOTE — QUICK NOTE
Nurse-Patient-Provider rounds were completed with the patient's nurse today, Ivis Azar  We discussed the plan is to plan for bedside VAC change on Tuesday 4/13  We reviewed all of the invasive devices/lines/telemetry orders   -None    DVT Prophylaxis:  Lovenox    Pain Assessment / Plan:  - Continue current analgesic regimen  PCA-D  Mobility Assessment / Plan:  - Activity as tolerated  Goals / Barriers for discharge:  Barriers to discharge include further bedside VAC changes schedule for 4/13  Case management following; case and discharge needs discussed  All questions and concerns were addressed  I spent greater than 14 minutes reviewing the plan with the patient and the nurse, and coordinating care for the day      Kiana Aguayo PA-C  4/12/2021

## 2021-04-12 NOTE — PROGRESS NOTES
Progress Note - General Surgery   Harpal Fang 45 y o  male MRN: [id number]  Unit/Bed#: [facility] 512-01 Encounter: [id number]    Assessment:  43yo M with L thigh soft tissue mass s/p excision and vac placement (4/6/21)    Plan:   Maintain LLE wound VAC -- next bedside change Tuesday 4/13   Pain control   OOB/ambulate   DVT PPx    Subjective/Objective     Subjective:   No acute events overnight  No issues with VAC since replaced yesterday  Pain controlled with dilaudid PCA  Objective:    Blood pressure 117/76, pulse 83, temperature 98 °F (36 7 °C), temperature source Oral, resp  rate 20, height 5' 6 75" (1 695 m), weight (!) 165 kg (363 lb 1 6 oz), SpO2 92 %  ,Body mass index is 57 3 kg/m²        Intake/Output Summary (Last 24 hours) at 4/12/2021 0518  Last data filed at 4/12/2021 0230  Gross per 24 hour   Intake 2428 6 ml   Output 6105 ml   Net -3676 4 ml       Invasive Devices     Peripheral Intravenous Line            Peripheral IV 04/08/21 Left Antecubital 3 days          Drain            Negative Pressure Wound Therapy (V A C ) Other (Comment) 5 days                Physical Exam:   Gen:  NAD  CV:  warm, well-perfused  Lungs: nl effort  Abd:  soft, NT/ND  Ext:  LLE VAC holding suction  Neuro: A&Ox3     Results from last 7 days   Lab Units 04/11/21  0436 04/10/21  0247 04/09/21  0854   WBC Thousand/uL 10 61* 12 18* 13 56*   HEMOGLOBIN g/dL 10 1* 10 6* 10 1*   HEMATOCRIT % 31 5* 33 2* 31 4*   PLATELETS Thousands/uL 400* 381 350     Results from last 7 days   Lab Units 04/11/21  0436 04/10/21  0247 04/09/21  0854   POTASSIUM mmol/L 4 0 4 4 4 2   CHLORIDE mmol/L 100 100 101   CO2 mmol/L 33* 30 29   BUN mg/dL 11 14 13   CREATININE mg/dL 0 95 0 95 1 04   CALCIUM mg/dL 9 1 8 7 8 5

## 2021-04-12 NOTE — PLAN OF CARE
Problem: Prexisting or High Potential for Compromised Skin Integrity  Goal: Skin integrity is maintained or improved  Description: INTERVENTIONS:  - Identify patients at risk for skin breakdown  - Assess and monitor skin integrity  - Assess and monitor nutrition and hydration status  - Monitor labs   - Assess for incontinence   - Turn and reposition patient  - Assist with mobility/ambulation  - Relieve pressure over bony prominences  - Avoid friction and shearing  - Provide appropriate hygiene as needed including keeping skin clean and dry  - Evaluate need for skin moisturizer/barrier cream  - Collaborate with interdisciplinary team   - Patient/family teaching  - Consider wound care consult   Outcome: Progressing     Problem: SAFETY,RESTRAINT: NV/NON-SELF DESTRUCTIVE BEHAVIOR  Goal: Remains free of harm/injury (restraint for non violent/non self-detsructive behavior)  Description: INTERVENTIONS:  - Instruct patient/family regarding restraint use   - Assess and monitor physiologic and psychological status   - Provide interventions and comfort measures to meet assessed patient needs   - Identify and implement measures to help patient regain control  - Assess readiness for release of restraint   Outcome: Progressing  Goal: Returns to optimal restraint-free functioning  Description: INTERVENTIONS:  - Assess the patient's behavior and symptoms that indicate continued need for restraint  - Identify and implement measures to help patient regain control  - Assess readiness for release of restraint   Outcome: Progressing     Problem: PAIN - ADULT  Goal: Verbalizes/displays adequate comfort level or baseline comfort level  Description: Interventions:  - Encourage patient to monitor pain and request assistance  - Assess pain using appropriate pain scale  - Administer analgesics based on type and severity of pain and evaluate response  - Implement non-pharmacological measures as appropriate and evaluate response  - Consider cultural and social influences on pain and pain management  - Notify physician/advanced practitioner if interventions unsuccessful or patient reports new pain  Outcome: Progressing     Problem: INFECTION - ADULT  Goal: Absence or prevention of progression during hospitalization  Description: INTERVENTIONS:  - Assess and monitor for signs and symptoms of infection  - Monitor lab/diagnostic results  - Monitor all insertion sites, i e  indwelling lines, tubes, and drains  - Monitor endotracheal if appropriate and nasal secretions for changes in amount and color  - [place] appropriate cooling/warming therapies per order  - Administer medications as ordered  - Instruct and encourage patient and family to use good hand hygiene technique  - Identify and instruct in appropriate isolation precautions for identified infection/condition  Outcome: Progressing     Problem: SAFETY ADULT  Goal: Patient will remain free of falls  Description: INTERVENTIONS:  - Assess patient frequently for physical needs  -  Identify cognitive and physical deficits and behaviors that affect risk of falls    -  [place] fall precautions as indicated by assessment   - Educate patient/family on patient safety including physical limitations  - Instruct patient to call for assistance with activity based on assessment  - Modify environment to reduce risk of injury  - Consider OT/PT consult to assist with strengthening/mobility  Outcome: Progressing  Goal: Maintain or return to baseline ADL function  Description: INTERVENTIONS:  -  Assess patient's ability to carry out ADLs; assess patient's baseline for ADL function and identify physical deficits which impact ability to perform ADLs (bathing, care of mouth/teeth, toileting, grooming, dressing, etc )  - Assess/evaluate cause of self-care deficits   - Assess range of motion  - Assess patient's mobility; develop plan if impaired  - Assess patient's need for assistive devices and provide as appropriate  - Encourage maximum independence but intervene and supervise when necessary  - Involve family in performance of ADLs  - Assess for home care needs following discharge   - Consider OT consult to assist with ADL evaluation and planning for discharge  - Provide patient education as appropriate  Outcome: Progressing  Goal: Maintain or return mobility status to optimal level  Description: INTERVENTIONS:  - Assess patient's baseline mobility status (ambulation, transfers, stairs, etc )    - Identify cognitive and physical deficits and behaviors that affect mobility  - Identify mobility aids required to assist with transfers and/or ambulation (gait belt, sit-to-stand, lift, walker, cane, etc )  - [place] fall precautions as indicated by assessment  - Record patient progress and toleration of activity level on Mobility SBAR; progress patient to next Phase/Stage  - Instruct patient to call for assistance with activity based on assessment  - Consider rehabilitation consult to assist with strengthening/weightbearing, etc   Outcome: Progressing     Problem: DISCHARGE PLANNING  Goal: Discharge to home or other facility with appropriate resources  Description: INTERVENTIONS:  - Identify barriers to discharge w/patient and caregiver  - Arrange for needed discharge resources and transportation as appropriate  - Identify discharge learning needs (meds, wound care, etc )  - Arrange for interpretive services to assist at discharge as needed  - Refer to Case Management Department for coordinating discharge planning if the patient needs post-hospital services based on physician/advanced practitioner order or complex needs related to functional status, cognitive ability, or social support system  Outcome: Progressing     Problem: Knowledge Deficit  Goal: Patient/family/caregiver demonstrates understanding of disease process, treatment plan, medications, and discharge instructions  Description: Complete learning assessment and assess knowledge base  Interventions:  - Provide teaching at level of understanding  - Provide teaching via preferred learning methods  Outcome: Progressing     Problem: CARDIOVASCULAR - ADULT  Goal: Maintains optimal cardiac output and hemodynamic stability  Description: INTERVENTIONS:  - Monitor I/O, vital signs and rhythm  - Monitor for S/S and trends of decreased cardiac output  - Administer and titrate ordered vasoactive medications to optimize hemodynamic stability  - Assess quality of pulses, skin color and temperature  - Assess for signs of decreased coronary artery perfusion  - Instruct patient to report change in severity of symptoms  Outcome: Progressing  Goal: Absence of cardiac dysrhythmias or at baseline rhythm  Description: INTERVENTIONS:  - Continuous cardiac monitoring, vital signs, obtain 12 lead EKG if ordered  - Administer antiarrhythmic and heart rate control medications as ordered  - Monitor electrolytes and administer replacement therapy as ordered  Outcome: Progressing     Problem: RESPIRATORY - ADULT  Goal: Achieves optimal ventilation and oxygenation  Description: INTERVENTIONS:  - Assess for changes in respiratory status  - Assess for changes in mentation and behavior  - Position to facilitate oxygenation and minimize respiratory effort  - Oxygen administered by appropriate delivery if ordered  - Initiate smoking cessation education as indicated  - Encourage broncho-pulmonary hygiene including cough, deep breathe, Incentive Spirometry  - Assess the need for suctioning and aspirate as needed  - Assess and instruct to report SOB or any respiratory difficulty  - Respiratory Therapy support as indicated  Outcome: Progressing     Problem: Potential for Falls  Goal: Patient will remain free of falls  Description: INTERVENTIONS:  - Assess patient frequently for physical needs  -  Identify cognitive and physical deficits and behaviors that affect risk of falls    -  [place] fall precautions as indicated by assessment   - Educate patient/family on patient safety including physical limitations  - Instruct patient to call for assistance with activity based on assessment  - Modify environment to reduce risk of injury  - Consider OT/PT consult to assist with strengthening/mobility  Outcome: Progressing     Problem: Nutrition/Hydration-ADULT  Goal: Nutrient/Hydration intake appropriate for improving, restoring or maintaining nutritional needs  Description: Monitor and assess patient's nutrition/hydration status for malnutrition  Collaborate with interdisciplinary team and initiate plan and interventions as ordered  Monitor patient's weight and dietary intake as ordered or per policy  Utilize nutrition screening tool and intervene as necessary  Determine patient's food preferences and provide high-protein, high-caloric foods as appropriate       INTERVENTIONS:  - Monitor oral intake, urinary output, labs, and treatment plans  - Assess nutrition and hydration status and recommend course of action  - Evaluate amount of meals eaten  - Assist patient with eating if necessary   - Allow adequate time for meals  - Recommend/ encourage appropriate diets, oral nutritional supplements, and vitamin/mineral supplements  - Order, calculate, and assess calorie counts as needed  - Recommend, monitor, and adjust tube feedings and TPN/PPN based on assessed needs  - Assess need for intravenous fluids  - Provide specific nutrition/hydration education as appropriate  - Include patient/family/caregiver in decisions related to nutrition  Outcome: Progressing

## 2021-04-12 NOTE — PHYSICAL THERAPY NOTE
Physical Therapy Treatment note     Patient Name: Marlyn Thao    TNPIX'L Date: 4/12/2021     Problem List  Principal Problem:    Left groin mass  Active Problems:    CAD (coronary artery disease)    Class 3 severe obesity due to excess calories with serious comorbidity and body mass index (BMI) of 50 0 to 59 9 in adult [facility])       Past Medical History  Past Medical History:   Diagnosis Date    Coronary artery disease     Diabetes mellitus ([facility] [address] )     Heart disease     CAD   s/p ptca with 1 stent 2012    Hidradenitis suppurativa     groin    Hyperlipidemia     Hypertension     MI (myocardial infarction) ([facility] [address] )     " silent " M I  in the past    Morbid obesity with BMI of 50 0-59 9, adult ([facility] [address] )     Myocardial infarction ([facility] [address] )     Nicotine dependence     Obesity     Pilonidal cyst         Past Surgical History  Past Surgical History:   Procedure Laterality Date    CORONARY ANGIOPLASTY WITH STENT PLACEMENT      INCISION AND DRAINAGE OF WOUND N/A 1/24/2017    Procedure: INCISION AND DRAINAGE (I&D) BUTTOCK, PILONIDAL CYST;  Surgeon: Ed Garcia MD;  Location: [address] [place] [address];  Service:    Marcus Gracia LEG SURGERY Left     I&D of left leg 2012    NM EXC SKIN BENIG >4 CM TRUNK,ARM,LEG Left 4/6/2021    Procedure: EXCISION THIGH MASS;  Surgeon: Mariluz Rosales MD;  [facility];  Service: General    NM NEG PRESS WOUND TX, < 50 CM Left 4/6/2021    Procedure: APPLICATION VAC DRESSING THIGH;  Surgeon: Mariluz Rosales MD;  [facility];  Service: General        04/12/21 0854   PT Last Visit   PT Visit Date 04/12/21   Note Type   Note Type Treatment   Pain Assessment   Pain Assessment Tool 0-10   Pain Score Worst Possible Pain   Pain Location/Orientation Orientation: Left; Location: Leg   Restrictions/Precautions   Weight Bearing Precautions Per Order No   Other Precautions Fall Risk;Pain;Multiple lines  (wound vac)   General   Chart Reviewed Yes   Family/Caregiver Present No   Cognition   Overall Cognitive Status WFL   Arousal/Participation Alert; Cooperative   Attention Attends with cues to redirect   Orientation Level Oriented X4   Bed Mobility   Supine to Sit 5  Supervision   Additional items Increased time required   Additional Comments sitting EOB at a S level   Transfers   Sit to Stand 5  Supervision   Stand to Sit 5  Supervision   Ambulation/Elevation   Gait pattern Excessively slow; Foward flexed; Shuffling; Wide DEJON; Improper Weight shift, unsteady, multiple standing rest breaks  Pt's SpO2 fluctated between 85% to 92% when ambulating  Pt was not on O2 when entering the room  Per RN O2 was brought with us in case  Gait Assistance 4  Minimal assist   Additional items Assist x 1   Assistive Device   (IV pole)   Distance [id number]   Balance   Static Sitting Fair +   Dynamic Sitting Fair   Static Standing Fair -   Dynamic Standing Fair -   Ambulatory Fair -   Endurance Deficit   Endurance Deficit Yes   Activity Tolerance   Activity Tolerance Patient limited by pain; Patient limited by fatigue   Nurse Made Aware nurse approved therapy session   Exercises   Balance training  sitting EOB at a S level   Assessment   Prognosis Good   Problem List Decreased strength;Decreased endurance; Impaired balance;Decreased mobility;Pain;Orthopedic restrictions   Assessment Pt presents to therapy today with reduced mobility, high risk of falling, poor activity tolerance, pain, gait abnormalities, [place]/SOB with minimal activity  These impairments limit the patient by requiring some assistance for mobility and limites how much he can participate with out rest breaks  Pt would benefit from continued skilled therapy while in the hospital to improve overall mobility and work towards a safe d/c  Recommend home with rehab  At end of session patient was left seated with call bell within reach  The patient's AM-PAC Basic Mobility Inpatient Short Form Raw Score is 21, Standardized Score is 45  55A standardized score greater than 42 9 suggests the patient may benefit from discharge to home  Please also refer to the recommendation of the Physical Therapist for safe discharge planning  Pt might progress home however due to increased steps to enter and lack of support rehab is the recommendation at this time  Will continue to assess  Barriers to Discharge Inaccessible home environment;Decreased caregiver support   Goals   STG Expiration Date 04/23/21   PT Treatment Day 1   Plan   Treatment/Interventions Functional transfer training;LE strengthening/ROM; Therapeutic exercise; Endurance training;Elevations; Bed mobility;Gait training   Progress Slow progress, medical status limitations   PT Frequency Other (Comment)  (3-5xwk)   Recommendation   PT Discharge Recommendation Post-Acute Rehabilitation Services   Equipment Recommended Cane   Change/add to [place]?  No   PT - OK to Discharge No   Additional Comments need to trial steps    AM-PAC Basic Mobility Inpatient   Turning in Bed Without Bedrails 4   Lying on Back to Sitting on Edge of Flat Bed 4   Moving Bed to Chair 3   Standing Up From Chair 4   Walk in Room 3   Climb 3-5 Stairs 3   Basic Mobility Inpatient Raw Score 21   Basic Mobility Standardized Score 45 55   Lyndsay Rodriguez, Pt, DPT

## 2021-04-13 LAB
BASOPHILS # BLD AUTO: 0.08 THOUSANDS/ΜL (ref 0–0.1)
BASOPHILS NFR BLD AUTO: 1 % (ref 0–1)
EOSINOPHIL # BLD AUTO: 0.49 THOUSAND/ΜL (ref 0–0.61)
EOSINOPHIL NFR BLD AUTO: 4 % (ref 0–6)
ERYTHROCYTE [DISTWIDTH] IN BLOOD BY AUTOMATED COUNT: 13.5 % (ref 11.6–15.1)
GLUCOSE SERPL-MCNC: 155 MG/DL (ref 65–140)
GLUCOSE SERPL-MCNC: 183 MG/DL (ref 65–140)
GLUCOSE SERPL-MCNC: 188 MG/DL (ref 65–140)
GLUCOSE SERPL-MCNC: 205 MG/DL (ref 65–140)
HCT VFR BLD AUTO: 32.9 % (ref 36.5–49.3)
HGB BLD-MCNC: 10.1 G/DL (ref 12–17)
IMM GRANULOCYTES # BLD AUTO: 0.12 THOUSAND/UL (ref 0–0.2)
IMM GRANULOCYTES NFR BLD AUTO: 1 % (ref 0–2)
LYMPHOCYTES # BLD AUTO: 2.23 THOUSANDS/ΜL (ref 0.6–4.47)
LYMPHOCYTES NFR BLD AUTO: 18 % (ref 14–44)
MCH RBC QN AUTO: 27.3 PG (ref 26.8–34.3)
MCHC RBC AUTO-ENTMCNC: 30.7 G/DL (ref 31.4–37.4)
MCV RBC AUTO: 89 FL (ref 82–98)
MONOCYTES # BLD AUTO: 1.14 THOUSAND/ΜL (ref 0.17–1.22)
MONOCYTES NFR BLD AUTO: 9 % (ref 4–12)
NEUTROPHILS # BLD AUTO: 8.53 THOUSANDS/ΜL (ref 1.85–7.62)
NEUTS SEG NFR BLD AUTO: 67 % (ref 43–75)
NRBC BLD AUTO-RTO: 0 /100 WBCS
PLATELET # BLD AUTO: 448 THOUSANDS/UL (ref 149–390)
PMV BLD AUTO: 9.1 FL (ref 8.9–12.7)
RBC # BLD AUTO: 3.7 MILLION/UL (ref 3.88–5.62)
WBC # BLD AUTO: 12.59 THOUSAND/UL (ref 4.31–10.16)

## 2021-04-13 PROCEDURE — 85025 COMPLETE CBC W/AUTO DIFF WBC: CPT | Performed by: SURGERY

## 2021-04-13 PROCEDURE — 99222 1ST HOSP IP/OBS MODERATE 55: CPT | Performed by: ANESTHESIOLOGY

## 2021-04-13 PROCEDURE — 82948 REAGENT STRIP/BLOOD GLUCOSE: CPT

## 2021-04-13 PROCEDURE — 99024 POSTOP FOLLOW-UP VISIT: CPT | Performed by: SURGERY

## 2021-04-13 RX ORDER — LORAZEPAM 2 MG/ML
INJECTION INTRAMUSCULAR
Status: DISPENSED
Start: 2021-04-13 | End: 2021-04-14

## 2021-04-13 RX ORDER — HYDROMORPHONE HYDROCHLORIDE 2 MG/1
2 TABLET ORAL EVERY 4 HOURS PRN
Status: DISCONTINUED | OUTPATIENT
Start: 2021-04-13 | End: 2021-04-14

## 2021-04-13 RX ORDER — ATORVASTATIN CALCIUM 80 MG/1
80 TABLET, FILM COATED ORAL
Status: DISCONTINUED | OUTPATIENT
Start: 2021-04-14 | End: 2021-04-29 | Stop reason: HOSPADM

## 2021-04-13 RX ORDER — HYDROMORPHONE HYDROCHLORIDE 2 MG/1
4 TABLET ORAL EVERY 4 HOURS PRN
Status: DISCONTINUED | OUTPATIENT
Start: 2021-04-13 | End: 2021-04-13

## 2021-04-13 RX ORDER — ASPIRIN 81 MG/1
81 TABLET, CHEWABLE ORAL DAILY
Status: DISCONTINUED | OUTPATIENT
Start: 2021-04-14 | End: 2021-04-29 | Stop reason: HOSPADM

## 2021-04-13 RX ORDER — HYDROMORPHONE HYDROCHLORIDE 2 MG/1
4 TABLET ORAL EVERY 4 HOURS PRN
Status: DISCONTINUED | OUTPATIENT
Start: 2021-04-13 | End: 2021-04-14

## 2021-04-13 RX ORDER — HYDROMORPHONE HCL/PF 1 MG/ML
1 SYRINGE (ML) INJECTION ONCE
Status: COMPLETED | OUTPATIENT
Start: 2021-04-13 | End: 2021-04-13

## 2021-04-13 RX ORDER — HYDROMORPHONE HYDROCHLORIDE 2 MG/1
2 TABLET ORAL EVERY 4 HOURS PRN
Status: DISCONTINUED | OUTPATIENT
Start: 2021-04-13 | End: 2021-04-13

## 2021-04-13 RX ORDER — LORAZEPAM 2 MG/ML
1 INJECTION INTRAMUSCULAR ONCE
Status: COMPLETED | OUTPATIENT
Start: 2021-04-13 | End: 2021-04-13

## 2021-04-13 RX ADMIN — METHOCARBAMOL TABLETS 750 MG: 750 TABLET, COATED ORAL at 11:26

## 2021-04-13 RX ADMIN — INSULIN LISPRO 4 UNITS: 100 INJECTION, SOLUTION INTRAVENOUS; SUBCUTANEOUS at 21:19

## 2021-04-13 RX ADMIN — CEFAZOLIN SODIUM 2000 MG: 2 SOLUTION INTRAVENOUS at 06:15

## 2021-04-13 RX ADMIN — HYDROMORPHONE HYDROCHLORIDE 4 MG: 2 TABLET ORAL at 16:53

## 2021-04-13 RX ADMIN — METHOCARBAMOL TABLETS 750 MG: 750 TABLET, COATED ORAL at 17:12

## 2021-04-13 RX ADMIN — LORAZEPAM 1 MG: 2 INJECTION INTRAMUSCULAR; INTRAVENOUS at 16:40

## 2021-04-13 RX ADMIN — GABAPENTIN 100 MG: 100 CAPSULE ORAL at 16:53

## 2021-04-13 RX ADMIN — ENOXAPARIN SODIUM 60 MG: 60 INJECTION SUBCUTANEOUS at 08:53

## 2021-04-13 RX ADMIN — OXYCODONE HYDROCHLORIDE 10 MG: 10 TABLET ORAL at 00:02

## 2021-04-13 RX ADMIN — CHLORHEXIDINE GLUCONATE 0.12% ORAL RINSE 15 ML: 1.2 LIQUID ORAL at 21:19

## 2021-04-13 RX ADMIN — GABAPENTIN 100 MG: 100 CAPSULE ORAL at 21:18

## 2021-04-13 RX ADMIN — HYDROMORPHONE HYDROCHLORIDE 1 MG: 1 INJECTION, SOLUTION INTRAMUSCULAR; INTRAVENOUS; SUBCUTANEOUS at 22:52

## 2021-04-13 RX ADMIN — ENOXAPARIN SODIUM 60 MG: 60 INJECTION SUBCUTANEOUS at 21:19

## 2021-04-13 RX ADMIN — HYDROMORPHONE HYDROCHLORIDE 4 MG: 2 TABLET ORAL at 21:20

## 2021-04-13 RX ADMIN — ASPIRIN 81 MG: 81 TABLET, CHEWABLE ORAL at 08:53

## 2021-04-13 RX ADMIN — TICAGRELOR 90 MG: 90 TABLET ORAL at 21:19

## 2021-04-13 RX ADMIN — DOCUSATE SODIUM AND SENNOSIDES 1 TABLET: 8.6; 5 TABLET ORAL at 21:18

## 2021-04-13 RX ADMIN — METHOCARBAMOL TABLETS 750 MG: 750 TABLET, COATED ORAL at 06:15

## 2021-04-13 RX ADMIN — HYDROMORPHONE HYDROCHLORIDE 1 MG: 1 INJECTION, SOLUTION INTRAMUSCULAR; INTRAVENOUS; SUBCUTANEOUS at 12:19

## 2021-04-13 RX ADMIN — HYDROMORPHONE HYDROCHLORIDE 1 MG: 1 INJECTION, SOLUTION INTRAMUSCULAR; INTRAVENOUS; SUBCUTANEOUS at 01:49

## 2021-04-13 RX ADMIN — HYDROMORPHONE HYDROCHLORIDE 4 MG: 2 TABLET ORAL at 11:26

## 2021-04-13 RX ADMIN — OXYCODONE HYDROCHLORIDE 10 MG: 10 TABLET ORAL at 07:49

## 2021-04-13 RX ADMIN — INSULIN LISPRO 4 UNITS: 100 INJECTION, SOLUTION INTRAVENOUS; SUBCUTANEOUS at 16:59

## 2021-04-13 RX ADMIN — CEFAZOLIN SODIUM 2000 MG: 2 SOLUTION INTRAVENOUS at 21:19

## 2021-04-13 RX ADMIN — INSULIN LISPRO 4 UNITS: 100 INJECTION, SOLUTION INTRAVENOUS; SUBCUTANEOUS at 07:47

## 2021-04-13 RX ADMIN — CHLORHEXIDINE GLUCONATE 0.12% ORAL RINSE 15 ML: 1.2 LIQUID ORAL at 08:53

## 2021-04-13 RX ADMIN — TICAGRELOR 90 MG: 90 TABLET ORAL at 09:00

## 2021-04-13 RX ADMIN — HYDROMORPHONE HYDROCHLORIDE 1 MG: 1 INJECTION, SOLUTION INTRAMUSCULAR; INTRAVENOUS; SUBCUTANEOUS at 19:31

## 2021-04-13 RX ADMIN — ACETAMINOPHEN 975 MG: 325 TABLET ORAL at 06:15

## 2021-04-13 RX ADMIN — ACETAMINOPHEN 975 MG: 325 TABLET ORAL at 21:18

## 2021-04-13 RX ADMIN — GABAPENTIN 100 MG: 100 CAPSULE ORAL at 08:54

## 2021-04-13 RX ADMIN — INSULIN LISPRO 4 UNITS: 100 INJECTION, SOLUTION INTRAVENOUS; SUBCUTANEOUS at 11:26

## 2021-04-13 RX ADMIN — ACETAMINOPHEN 975 MG: 325 TABLET ORAL at 15:01

## 2021-04-13 RX ADMIN — HYDROMORPHONE HYDROCHLORIDE 1 MG: 1 INJECTION, SOLUTION INTRAMUSCULAR; INTRAVENOUS; SUBCUTANEOUS at 09:00

## 2021-04-13 RX ADMIN — HYDROMORPHONE HYDROCHLORIDE 1 MG: 1 INJECTION, SOLUTION INTRAMUSCULAR; INTRAVENOUS; SUBCUTANEOUS at 16:40

## 2021-04-13 RX ADMIN — Medication: at 14:05

## 2021-04-13 RX ADMIN — CEFAZOLIN SODIUM 2000 MG: 2 SOLUTION INTRAVENOUS at 15:02

## 2021-04-13 NOTE — PROGRESS NOTES
-768Progress Note - General Surgery   Javon Goldberg 45 y o  male MRN: [id number]  Unit/Bed#: [facility] 512-01 Encounter: [id number]    Assessment:  45yo M with L thigh soft tissue mass s/p excision and vac placement (4/6/21)     cc SS  Plan:   Maintain LLE wound VAC --will change today  Will give IV pain meds prior to changing   Pain control   OOB/ambulate   DVT PPx   Disposition planning  F/U CM    Subjective/Objective     Subjective:   No acute events  Pain tolerable with meds  No chest pain/SOB  NO N/V  No fever/chills  Objective:    Blood pressure 126/53, pulse 72, temperature 98 2 °F (36 8 °C), resp  rate 18, height 5' 6 75" (1 695 m), weight (!) 165 kg (363 lb 1 6 oz), SpO2 (!) 88 %  ,Body mass index is 57 3 kg/m²  Intake/Output Summary (Last 24 hours) at 4/12/2021 2327  Last data filed at 4/12/2021 2301  Gross per 24 hour   Intake 3019 ml   Output 4675 ml   Net -1656 ml       Invasive Devices     Peripheral Intravenous Line            Peripheral IV 04/12/21 Right Wrist less than 1 day          Drain            Negative Pressure Wound Therapy (V A C ) Other (Comment) 6 days                Physical Exam:   General: AAOx3, NAD  HEENT: atraumatic, non-icteric sclera  Card: RRR, not tachycardic  Pulm: on RA, not tachypnic  Abd: Soft, non-tender, non distended  LLE: Vac in place, no leak  Surrounding tissues healthy and without signs of infection  Integumentary: no rashes        Results from last 7 days   Lab Units 04/12/21  0522 04/11/21  0436 04/10/21  0247   WBC Thousand/uL 11 08* 10 61* 12 18*   HEMOGLOBIN g/dL 10 4* 10 1* 10 6*   HEMATOCRIT % 33 1* 31 5* 33 2*   PLATELETS Thousands/uL 431* 400* 381     Results from last 7 days   Lab Units 04/12/21  0522 04/11/21  0436 04/10/21  0247   POTASSIUM mmol/L 4 0 4 0 4 4   CHLORIDE mmol/L 98* 100 100   CO2 mmol/L 31 33* 30   BUN mg/dL 12 11 14   CREATININE mg/dL 1 00 0 95 0 95   CALCIUM mg/dL 9 2 9 1 8 7

## 2021-04-13 NOTE — QUICK NOTE
Acute Care Surgery  Bedside V A C  Procedure Note    A timeout was performed with the patient's nurse, Ale Adame, prior to beginning the dressing change  The nurse remained present to confirm the correct dressing counts on removal of the VAC dressing and was debriefed at the completion of the dressing change  Location of wound: Left groin / proximal thigh    Dressings and Foam removed: One Xeroform dressing was removed from the closed incision on the anterior left thigh as well as for pieces of oil emulsion gauze dressing (Adaptic) from the wound base  2 Black Foam    Dimensions of wound: 35 cm x 15 cm x 4 cm    Description of wound: On inspection of the wound today, the wound base is clean, pink and healthy with some bleeding from the wound edges and wound base  There was no necrotic or nonviable tissue requiring debridement  On the anterior thigh, the close portion of the wound was intact with sutures in place  The periwound skin remains clean and intact and unremarkable  VAC dressing application:  The periwound skin was cleaned and dried  The wound base was covered with 4 pieces of oil emulsion gauze dressing (Adaptic) followed by 3 pieces of black foam dressing  One additional piece of oil emulsion gauze dressing (Adaptic) was used to cover the closed portion of the incision  The dressings were then covered with VAC drape  The track pad was then placed over the base of black foam  The VAC was then set to 125 mmHg low Continuous suction  The patient tolerated the procedure well and there were no complications  The patient did not require any excisional debridement during today's dressing change  A total of 3 pieces of black foam and 5 pieces of oil emulsion gauze dressing/Adaptic were used during the new dressing application  VAC settings:  125 mmHg  Continuous    Additional Notes: [facility] sticker placed over the dressing per protocol    The next [facility] dressing change will be planned for Friday, 04/16/2021  This dressing change took greater than 40 minutes to complete      Dwight Godwin PA-C  4/13/2021 4:29 PM

## 2021-04-13 NOTE — CONSULTS
Consult Note- Acute Pain Service   Jorden Suazo 45 y o  male MRN: [id number]  Unit/Bed#: [facility] 12-46 Encounter: [id number]               Assessment/Plan     Assessment:   Patient Active Problem List   Diagnosis    Abnormal liver enzymes    CAD (coronary artery disease)    Dyslipidemia    HTN (hypertension)    Morbid obesity ([facility] )    Pilonidal cyst    DM type 2, uncontrolled, with neuropathy ([place] [address] )    Type 1 non-ST elevation myocardial infarction (NSTEMI) ([place] [address] )    Left groin mass    Skin abscess    Encounter for other preprocedural examination    Morbid (severe) obesity due to excess calories ([place] [address] )    Class 3 severe obesity due to excess calories with serious comorbidity and body mass index (BMI) of 50 0 to 59 9 in adult ([place] [address] )    Hypertriglyceridemia    Dyslipidemia (high LDL; low HDL)      Jorden Suazo is a 45 y o  male  Admitted on 4/6/21 for mass excision of hidradenitis in the left proximal thigh with VAC dressing placement  Patient's pain has been difficult to control, therefore APS consulted  Plan:    Continue Tylenol 975 mg p o  q 8 hours scheduled   Continue Dilaudid PCA at continuous rate 0, PCA dose 0 2 mg, lockout 6 minutes and 1 hour limit 2 mg  Will plan to discontinue this tomorrow   Continue Dilaudid 2 mg p o  q 4 hours p r n  moderate pain   Continue Dilaudid 4 mg p o  q 4 hours p r n  severe pain   Continue Dilaudid 1 mg IV q 3 hours p r n  breakthrough pain   Continue Neurontin 100 mg p o  t i d  scheduled   Continue Robaxin 750 mg p o  q 6 hours scheduled   Continue bowel regimen to avoid opioid induced constipation  APS will continue to follow   Please call  / [phone] or [employer] Acute Pain Service - [facility] (/ between [phone] and on weekends) with questions or concerns    History of Present Illness    Admit Date:  4/6/2021  Hospital Day:  7 days  Primary Service:  Surgery-General  Attending Provider:  Jonathan Edwards MD  Reason for Consult / Principal Problem:   Left thigh pain  HPI: Crystal Boone is a 45 y o  male who presents with  Hidradenitis of the left upper thigh who underwent wide excision and VAC placement on 4/6/21  Patient has had bedside VAC changes since that time and has had difficult to control pain despite higher doses of oxycodone  Patient has history of type 2 diabetes, CAD status post stent in 2012, hyperlipidemia, hypertension, MI, morbid obesity with BMI 57, nicotine dependence  Patient was switched to p o  Dilaudid 2 and 4 mg every 4 hours as needed for moderate or severe pain  Patient also continues on a Dilaudid PCA with 0 continuous rate, PCA dose 0 2 mg, lockout 6 minutes with 1 hour limit 2 mg  Also order Dilaudid 1 mg IV q 3 hours p r n  breakthrough pain which patient has been using frequently  Current pain location(s): Left thigh  Pain Scale:    9/10  Quality:  Burning, sharp  Current Analgesic regimen:    Tylenol 975 mg p o  q 8 hours scheduled  Dilaudid 2 mg p o  q 4 hours p r n  moderate pain  Dilaudid 4 mg p o  q 4 hours p r n  moderate pain  Dilaudid 1 mg IV q 3 hours p r n  breakthrough pain  Dilaudid PCA with continuous rate 0, PCA dose 0 2 mg, lockout 6 minutes and 1 hour limit 2 mg  Neurontin 100 mg p o  t i d  scheduled  Robaxin 750 mg p o  q 6 hours scheduled  Pain History:   No history of chronic pain  Pain Management Provider:   No outpatient pain management provider    I have reviewed the patient's controlled substance dispensing history in the Prescription Drug Monitoring Program in compliance with the [facility] regulations before prescribing any controlled substances  Past 1 year review of PDMP:   review of PDMP reveals no prescriptions for controlled substances in the last year  Inpatient consult to Acute Pain Service  Consult performed by: Magda Courtney PA-C  Consult ordered by:  Eden Hernandez PA-C          Review of Systems   Musculoskeletal: Positive for myalgias  Skin: Positive for wound  All other systems reviewed and are negative        Historical Information   Past Medical History:   Diagnosis Date    Coronary artery disease     Diabetes mellitus ([facility] )     Heart disease     CAD   s/p ptca with 1 stent 2012    Hidradenitis suppurativa     groin    Hyperlipidemia     Hypertension     MI (myocardial infarction) ([facility] )     " silent " M I  in the past    Morbid obesity with BMI of 50 0-59 9, adult ([facility] )     Myocardial infarction ([facility] )     Nicotine dependence     Obesity     Pilonidal cyst      Past Surgical History:   Procedure Laterality Date    CORONARY ANGIOPLASTY WITH STENT PLACEMENT      INCISION AND DRAINAGE OF WOUND N/A 1/24/2017    Procedure: INCISION AND DRAINAGE (I&D) BUTTOCK, PILONIDAL CYST;  Surgeon: Bing Levi MD;  Location: [address] [place] [address];  Service:    Juju Bashir LEG SURGERY Left     I&D of left leg 2012    IA EXC SKIN BENIG >4 CM TRUNK,ARM,LEG Left 4/6/2021    Procedure: EXCISION THIGH MASS;  Surgeon: Amy Mills MD;  [facility]: [facility];  Service: General    IA NEG PRESS WOUND [address] [place] [address], < 50 CM Left 4/6/2021    Procedure: APPLICATION VAC DRESSING THIGH;  Surgeon: Amy Mills MD;  Location: [facility];  Service: General     Social History   Social History     Substance and Sexual Activity   Alcohol Use Not Currently    Comment: rarely     Social History     Substance and Sexual Activity   Drug Use No     Social History     Tobacco Use   Smoking Status Former Smoker    Packs/day: 1 00    Years: 20 00    Pack years: 20 00    Types: Cigarettes   Smokeless Tobacco Never Used     Family History:   Family History   Problem Relation Age of Onset    Heart disease Father        Meds/Allergies   all current active meds have been reviewed, current meds:   Current Facility-Administered Medications   Medication Dose Route Frequency    acetaminophen (TYLENOL) tablet 975 mg  975 mg Oral Q8H [address]    aspirin chewable tablet 81 mg  81 mg Per NG Tube Daily    atorvastatin (LIPITOR) tablet 80 mg  80 mg Per NG Tube Daily With Dinner    ceFAZolin (ANCEF) IVPB (premix in dextrose) 2,000 mg 50 mL  2,000 mg Intravenous Q8H    chlorhexidine (PERIDEX) 0 12 % oral rinse 15 mL  15 mL Swish & Spit Q12H [address]    enoxaparin (LOVENOX) subcutaneous injection 60 mg  60 mg Subcutaneous Q12H [address]    gabapentin (NEURONTIN) capsule 100 mg  100 mg Oral TID    HYDROmorphone (DILAUDID) 1 mg/mL 50 mL PCA   Intravenous Continuous    HYDROmorphone (DILAUDID) injection 1 mg  1 mg Intravenous Q3H PRN    HYDROmorphone (DILAUDID) tablet 2 mg  2 mg Oral Q4H PRN    Or    HYDROmorphone (DILAUDID) tablet 4 mg  4 mg Oral Q4H PRN    insulin lispro (HumaLOG) 100 units/mL subcutaneous injection 4-20 Units  4-20 Units Subcutaneous 4x Daily (AC & HS)    methocarbamol (ROBAXIN) tablet 750 mg  750 mg Oral Q6H [facility]    naloxone (NARCAN) injection 0 1 mg  0 1 mg Intravenous Q3 min PRN    senna-docusate sodium (SENOKOT S) 8 6-50 mg per tablet 1 tablet  1 tablet Oral HS    sodium chloride (OCEAN) 0 65 % nasal spray 1 spray  1 spray Each Nare Q1H PRN    ticagrelor (BRILINTA) tablet 90 mg  90 mg Per NG Tube Q12H [address]    and PTA meds:   Prior to Admission Medications   Prescriptions Last Dose Informant Patient Reported? Taking?    Omega-3 Fatty Acids (fish oil) 1,000 mg 4/5/2021 at 0500  No Yes   Sig: Take 1 capsule (1,000 mg total) by mouth 2 (two) times a day   Ticagrelor (BRILINTA PO) 4/5/2021 at 0500  Yes Yes   Sig: Take by mouth   amLODIPine (NORVASC) 10 mg tablet 4/5/2021 at 0500  No Yes   Sig: take 1 tablet by mouth once daily   aspirin (ECOTRIN LOW STRENGTH) 81 mg EC tablet 4/1/2021  No Yes   Sig: Take 1 tablet (81 mg total) by mouth daily   atorvastatin (LIPITOR) 80 mg tablet 4/4/2021 at 1900  No Yes   Sig: Take 1 tablet (80 mg total) by mouth daily   carvedilol (COREG) 6 25 mg tablet 4/5/2021 at 0500  No Yes   Sig: Take 1 tablet (6 25 mg total) by mouth 2 (two) times a day with meals   clindamycin (CLEOCIN) 150 mg capsule 4/5/2021 at 1700  No Yes   Sig: Take 1 capsule (150 mg total) by mouth every 12 (twelve) hours for 6 days   glimepiride (AMARYL) 4 mg tablet 4/5/2021 at 0500  No Yes   Sig: Take 1 tablet (4 mg total) by mouth daily with breakfast   lisinopril (ZESTRIL) 10 mg tablet 4/5/2021 at 0500  No Yes   Sig: Take 1 tablet (10 mg total) by mouth daily   metFORMIN (GLUCOPHAGE) 1000 MG tablet 4/5/2021 at 0500  No Yes   Sig: Take 1 tablet (1,000 mg total) by mouth 2 (two) times a day with meals      Facility-Administered Medications: None       Allergies   Allergen Reactions    Amoxicillin Hives       Objective   Temp:  [98 °F (36 7 °C)-98 2 °F (36 8 °C)] 98 1 °F (36 7 °C)  HR:  [63-82] 63  Resp:  [18-20] 18  BP: (113-126)/(53-55) 113/55    Intake/Output Summary (Last 24 hours) at 4/13/2021 1341  Last data filed at 4/13/2021 1300  Gross per 24 hour   Intake 2137 8 ml   Output 4300 ml   Net -2162 2 ml       Physical Exam  Vitals signs and nursing note reviewed  Constitutional:       General: He is awake  He is not in acute distress  Appearance: He is morbidly obese  He is not ill-appearing, toxic-appearing or diaphoretic  Comments: Appears uncomfortable   Eyes:      Conjunctiva/sclera: Conjunctivae normal       Pupils: Pupils are equal, round, and reactive to light  Cardiovascular:      Rate and Rhythm: Normal rate and regular rhythm  Skin:     General: Skin is warm and dry  Neurological:      Mental Status: He is alert and oriented to person, place, and time  GCS: GCS eye subscore is 4  GCS verbal subscore is 5  GCS motor subscore is 6  Psychiatric:         Behavior: Behavior is cooperative  Lab Results:   I have personally reviewed pertinent labs  , CBC:   Lab Results   Component Value Date    WBC 12 59 (H) 04/13/2021    HGB 10 1 (L) 04/13/2021    HCT 32 9 (L) 04/13/2021    MCV 89 04/13/2021     (H) 04/13/2021    MCH 27 3 04/13/2021    MCHC 30 7 (L) 04/13/2021    RDW 13 5 04/13/2021    MPV 9 1 04/13/2021    NRBC 0 04/13/2021   , CMP: No results found for: SODIUM, K, CL, CO2, ANIONGAP, BUN, CREATININE, GLUCOSE, CALCIUM, AST, ALT, ALKPHOS, PROT, BILITOT, EGFR, BMP:No results found for: SODIUM, K, CL, CO2, BUN, CREATININE, GLUC, GLUF, CALCIUM, AGAP, EGFR, PT/PTT:No results found for: PT, PTT    Imaging Studies: I have personally reviewed pertinent reports  EKG, Pathology, and Other Studies: I have personally reviewed pertinent reports  Counseling / Coordination of Care  Total floor / unit time spent today Level 5 = 110 minutes  Greater than 50% of total time was spent with the patient and / or family counseling and / or coordination of care  A description of the counseling / coordination of care:  Patient interview, physical examination, review of PDMP, review of medical record, review of imaging and laboratory data, development of pain management plan, discussion of pain management plan with patient and primary service  Please note that the APS provides consultative services regarding pain management only  With the exception of ketamine and epidural infusions and except when indicated, final decisions regarding starting or changing doses of analgesic medications are at the discretion of the consulting service  Off hours consultation and/or medication management is generally not available      Caroline Contreras PA-C  Acute Pain Service

## 2021-04-14 LAB
BASOPHILS # BLD AUTO: 0.06 THOUSANDS/ΜL (ref 0–0.1)
BASOPHILS NFR BLD AUTO: 1 % (ref 0–1)
EOSINOPHIL # BLD AUTO: 0.44 THOUSAND/ΜL (ref 0–0.61)
EOSINOPHIL NFR BLD AUTO: 3 % (ref 0–6)
ERYTHROCYTE [DISTWIDTH] IN BLOOD BY AUTOMATED COUNT: 13.4 % (ref 11.6–15.1)
GLUCOSE SERPL-MCNC: 147 MG/DL (ref 65–140)
GLUCOSE SERPL-MCNC: 148 MG/DL (ref 65–140)
GLUCOSE SERPL-MCNC: 153 MG/DL (ref 65–140)
GLUCOSE SERPL-MCNC: 206 MG/DL (ref 65–140)
HCT VFR BLD AUTO: 31.1 % (ref 36.5–49.3)
HGB BLD-MCNC: 9.6 G/DL (ref 12–17)
IMM GRANULOCYTES # BLD AUTO: 0.15 THOUSAND/UL (ref 0–0.2)
IMM GRANULOCYTES NFR BLD AUTO: 1 % (ref 0–2)
LYMPHOCYTES # BLD AUTO: 2.09 THOUSANDS/ΜL (ref 0.6–4.47)
LYMPHOCYTES NFR BLD AUTO: 16 % (ref 14–44)
MCH RBC QN AUTO: 27.7 PG (ref 26.8–34.3)
MCHC RBC AUTO-ENTMCNC: 30.9 G/DL (ref 31.4–37.4)
MCV RBC AUTO: 90 FL (ref 82–98)
MONOCYTES # BLD AUTO: 1.08 THOUSAND/ΜL (ref 0.17–1.22)
MONOCYTES NFR BLD AUTO: 8 % (ref 4–12)
NEUTROPHILS # BLD AUTO: 9.51 THOUSANDS/ΜL (ref 1.85–7.62)
NEUTS SEG NFR BLD AUTO: 71 % (ref 43–75)
NRBC BLD AUTO-RTO: 0 /100 WBCS
PLATELET # BLD AUTO: 482 THOUSANDS/UL (ref 149–390)
PMV BLD AUTO: 9.2 FL (ref 8.9–12.7)
RBC # BLD AUTO: 3.46 MILLION/UL (ref 3.88–5.62)
WBC # BLD AUTO: 13.33 THOUSAND/UL (ref 4.31–10.16)

## 2021-04-14 PROCEDURE — 99232 SBSQ HOSP IP/OBS MODERATE 35: CPT | Performed by: PHYSICIAN ASSISTANT

## 2021-04-14 PROCEDURE — 82948 REAGENT STRIP/BLOOD GLUCOSE: CPT

## 2021-04-14 PROCEDURE — 99024 POSTOP FOLLOW-UP VISIT: CPT | Performed by: SURGERY

## 2021-04-14 PROCEDURE — 85025 COMPLETE CBC W/AUTO DIFF WBC: CPT | Performed by: STUDENT IN AN ORGANIZED HEALTH CARE EDUCATION/TRAINING PROGRAM

## 2021-04-14 PROCEDURE — 97530 THERAPEUTIC ACTIVITIES: CPT

## 2021-04-14 PROCEDURE — 97116 GAIT TRAINING THERAPY: CPT

## 2021-04-14 RX ORDER — HYDROMORPHONE HYDROCHLORIDE 2 MG/1
6 TABLET ORAL EVERY 4 HOURS PRN
Status: DISCONTINUED | OUTPATIENT
Start: 2021-04-14 | End: 2021-04-28

## 2021-04-14 RX ORDER — HYDROMORPHONE HYDROCHLORIDE 2 MG/1
4 TABLET ORAL EVERY 4 HOURS PRN
Status: DISCONTINUED | OUTPATIENT
Start: 2021-04-14 | End: 2021-04-28

## 2021-04-14 RX ADMIN — HYDROMORPHONE HYDROCHLORIDE 4 MG: 2 TABLET ORAL at 05:41

## 2021-04-14 RX ADMIN — ACETAMINOPHEN 975 MG: 325 TABLET ORAL at 21:53

## 2021-04-14 RX ADMIN — ACETAMINOPHEN 975 MG: 325 TABLET ORAL at 14:06

## 2021-04-14 RX ADMIN — INSULIN LISPRO 4 UNITS: 100 INJECTION, SOLUTION INTRAVENOUS; SUBCUTANEOUS at 11:43

## 2021-04-14 RX ADMIN — HYDROMORPHONE HYDROCHLORIDE 1 MG: 1 INJECTION, SOLUTION INTRAMUSCULAR; INTRAVENOUS; SUBCUTANEOUS at 10:13

## 2021-04-14 RX ADMIN — METHOCARBAMOL TABLETS 750 MG: 750 TABLET, COATED ORAL at 23:25

## 2021-04-14 RX ADMIN — ENOXAPARIN SODIUM 60 MG: 60 INJECTION SUBCUTANEOUS at 09:11

## 2021-04-14 RX ADMIN — HYDROMORPHONE HYDROCHLORIDE 6 MG: 2 TABLET ORAL at 18:19

## 2021-04-14 RX ADMIN — GABAPENTIN 100 MG: 100 CAPSULE ORAL at 09:09

## 2021-04-14 RX ADMIN — METHOCARBAMOL TABLETS 750 MG: 750 TABLET, COATED ORAL at 11:43

## 2021-04-14 RX ADMIN — CHLORHEXIDINE GLUCONATE 0.12% ORAL RINSE 15 ML: 1.2 LIQUID ORAL at 21:53

## 2021-04-14 RX ADMIN — METHOCARBAMOL TABLETS 750 MG: 750 TABLET, COATED ORAL at 05:41

## 2021-04-14 RX ADMIN — CHLORHEXIDINE GLUCONATE 0.12% ORAL RINSE 15 ML: 1.2 LIQUID ORAL at 09:09

## 2021-04-14 RX ADMIN — ACETAMINOPHEN 975 MG: 325 TABLET ORAL at 05:41

## 2021-04-14 RX ADMIN — GABAPENTIN 100 MG: 100 CAPSULE ORAL at 21:53

## 2021-04-14 RX ADMIN — HYDROMORPHONE HYDROCHLORIDE 1 MG: 1 INJECTION, SOLUTION INTRAMUSCULAR; INTRAVENOUS; SUBCUTANEOUS at 20:07

## 2021-04-14 RX ADMIN — ASPIRIN 81 MG: 81 TABLET, CHEWABLE ORAL at 09:08

## 2021-04-14 RX ADMIN — DOCUSATE SODIUM AND SENNOSIDES 1 TABLET: 8.6; 5 TABLET ORAL at 21:53

## 2021-04-14 RX ADMIN — HYDROMORPHONE HYDROCHLORIDE 6 MG: 2 TABLET ORAL at 23:25

## 2021-04-14 RX ADMIN — HYDROMORPHONE HYDROCHLORIDE 6 MG: 2 TABLET ORAL at 14:06

## 2021-04-14 RX ADMIN — GABAPENTIN 100 MG: 100 CAPSULE ORAL at 17:20

## 2021-04-14 RX ADMIN — METHOCARBAMOL TABLETS 750 MG: 750 TABLET, COATED ORAL at 01:05

## 2021-04-14 RX ADMIN — CEFAZOLIN SODIUM 2000 MG: 2 SOLUTION INTRAVENOUS at 05:41

## 2021-04-14 RX ADMIN — ATORVASTATIN CALCIUM 80 MG: 80 TABLET, FILM COATED ORAL at 17:20

## 2021-04-14 RX ADMIN — TICAGRELOR 90 MG: 90 TABLET ORAL at 09:09

## 2021-04-14 RX ADMIN — TICAGRELOR 90 MG: 90 TABLET ORAL at 21:53

## 2021-04-14 RX ADMIN — METHOCARBAMOL TABLETS 750 MG: 750 TABLET, COATED ORAL at 17:20

## 2021-04-14 RX ADMIN — ENOXAPARIN SODIUM 60 MG: 60 INJECTION SUBCUTANEOUS at 21:54

## 2021-04-14 NOTE — PROGRESS NOTES
Progress Note - Acute Pain Service    Audra Barron 45 y o  male MRN: [id number]  Unit/Bed#: [facility] 512-01 Encounter: [id number]      Assessment:   Principal Problem:    Left groin mass  Active Problems:    CAD (coronary artery disease)    Class 3 severe obesity due to excess calories with serious comorbidity and body mass index (BMI) of 50 0 to 59 9 in [facility])    Audra Barron is a 45 y o  male  Admitted on 4/6/21 for mass excision of hidradenitis in the left proximal thigh with VAC dressing placement  Patient's pain has been difficult to control, therefore APS consulted  Plan:    Continue Tylenol 975 mg p o  q 8 hours scheduled   Discontinue Dilaudid PCA   Increase Dilaudid to 4 mg p o  Q 4 hours p r n  moderate pain   Increase Dilaudid to 6 mg p o  q 4 hours p r n  severe pain   Continue Dilaudid 1 mg IV q 3 hours p r n  breakthrough pain   Continue Neurontin 100 mg p o  t i d  scheduled   Continue Robaxin 750 mg p o  q 6 hours scheduled   Continue bowel regimen to avoid opioid induced constipation  APS will continue to follow  Please call  / [phone] or [email or web address] (/ between [phone] and on weekends) with questions or concerns    Pain History  Current pain location(s): Left leg  Pain Scale:    9/10  Quality:  burn  24 hour history:  Patient had improved pain control from switch to oral Dilaudid yesterday  Plan to discontinue Dilaudid PCA today      Opioid requirement previous 24 hours:   Dilaudid 16 mg p o , Dilaudid 5 mg IV, Dilaudid 8 3 mg via PCA    Meds/Allergies   all current active meds have been reviewed, current meds:   Current Facility-Administered Medications   Medication Dose Route Frequency    acetaminophen (TYLENOL) tablet 975 mg  975 mg Oral Q8H [facility]    aspirin chewable tablet 81 mg  81 mg Oral Daily    atorvastatin (LIPITOR) tablet 80 mg  80 mg Oral Daily With Dinner    chlorhexidine (PERIDEX) 0 12 % oral rinse 15 mL  15 mL Swish & Spit Q12H [address]    enoxaparin (LOVENOX) subcutaneous injection 60 mg  60 mg Subcutaneous Q12H [address]    gabapentin (NEURONTIN) capsule 100 mg  100 mg Oral TID    HYDROmorphone (DILAUDID) 1 mg/mL 50 mL PCA   Intravenous Continuous    HYDROmorphone (DILAUDID) injection 1 mg  1 mg Intravenous Q3H PRN    HYDROmorphone (DILAUDID) tablet 2 mg  2 mg Oral Q4H PRN    Or    HYDROmorphone (DILAUDID) tablet 4 mg  4 mg Oral Q4H PRN    insulin lispro (HumaLOG) 100 units/mL subcutaneous injection 4-20 Units  4-20 Units Subcutaneous 4x Daily (AC & HS)    methocarbamol (ROBAXIN) tablet 750 mg  750 mg Oral Q6H [address]    naloxone (NARCAN) injection 0 1 mg  0 1 mg Intravenous Q3 min PRN    senna-docusate sodium (SENOKOT S) 8 6-50 mg per tablet 1 tablet  1 tablet Oral HS    sodium chloride (OCEAN) 0 65 % nasal spray 1 spray  1 spray Each Nare Q1H PRN    ticagrelor (BRILINTA) tablet 90 mg  90 mg Per NG Tube Q12H [address]    and PTA meds:   Prior to Admission Medications   Prescriptions Last Dose Informant Patient Reported? Taking?    Omega-3 Fatty Acids (fish oil) 1,000 mg 4/5/2021 at 0500  No Yes   Sig: Take 1 capsule (1,000 mg total) by mouth 2 (two) times a day   Ticagrelor (BRILINTA PO) 4/5/2021 at 0500  Yes Yes   Sig: Take by mouth   amLODIPine (NORVASC) 10 mg tablet 4/5/2021 at 0500  No Yes   Sig: take 1 tablet by mouth once daily   aspirin (ECOTRIN LOW STRENGTH) 81 mg EC tablet 4/1/2021  No Yes   Sig: Take 1 tablet (81 mg total) by mouth daily   atorvastatin (LIPITOR) 80 mg tablet 4/4/2021 at 1900  No Yes   Sig: Take 1 tablet (80 mg total) by mouth daily   carvedilol (COREG) 6 25 mg tablet 4/5/2021 at 0500  No Yes   Sig: Take 1 tablet (6 25 mg total) by mouth 2 (two) times a day with meals   clindamycin (CLEOCIN) 150 mg capsule 4/5/2021 at 1700  No Yes   Sig: Take 1 capsule (150 mg total) by mouth every 12 (twelve) hours for 6 days   glimepiride (AMARYL) 4 mg tablet 4/5/2021 at 0500  No Yes   Sig: Take 1 tablet (4 mg total) by mouth daily with breakfast   lisinopril (ZESTRIL) 10 mg tablet 4/5/2021 at 0500  No Yes   Sig: Take 1 tablet (10 mg total) by mouth daily   metFORMIN (GLUCOPHAGE) 1000 MG tablet 4/5/2021 at 0500  No Yes   Sig: Take 1 tablet (1,000 mg total) by mouth 2 (two) times a day with meals      Facility-Administered Medications: None       Allergies   Allergen Reactions    Amoxicillin Hives       Objective     Temp:  [97 8 °F (36 6 °C)-98 °F (36 7 °C)] 98 °F (36 7 °C)  HR:  [62-74] 62  Resp:  [20-22] 20  BP: ()/(51-69) 131/64    Physical Exam  Vitals signs and nursing note reviewed  Constitutional:       General: He is awake  He is not in acute distress  Skin:     General: Skin is warm and dry  Neurological:      Mental Status: He is alert and oriented to person, place, and time  GCS: GCS eye subscore is 4  GCS verbal subscore is 5  GCS motor subscore is 6  Psychiatric:         Behavior: Behavior is cooperative  Lab Results:   Results from last 7 days   Lab Units 04/14/21  0548   WBC Thousand/uL 13 33*   HEMOGLOBIN g/dL 9 6*   HEMATOCRIT % 31 1*   PLATELETS Thousands/uL 482*      Results from last 7 days   Lab Units 04/12/21  0522   POTASSIUM mmol/L 4 0   CHLORIDE mmol/L 98*   CO2 mmol/L 31   BUN mg/dL 12   CREATININE mg/dL 1 00   CALCIUM mg/dL 9 2       Imaging Studies: I have personally reviewed pertinent reports  EKG, Pathology, and Other Studies: I have personally reviewed pertinent reports  Counseling / Coordination of Care  Total floor / unit time spent today 30 minutes  Greater than 50% of total time was spent with the patient and / or family counseling and / or coordination of care  A description of the counseling / coordination of care:  Patient interview, physical examination, review of medical record, review of imaging and laboratory data, development of pain management plan, discussion of pain management plan with patient and primary service      Please note that the APS provides consultative services regarding pain management only  With the exception of ketamine and epidural infusions and except when indicated, final decisions regarding starting or changing doses of analgesic medications are at the discretion of the consulting service  Off hours consultation and/or medication management is generally not available      Kishore Schreiber PA-C  Acute Pain Service

## 2021-04-14 NOTE — CASE MANAGEMENT
CM met with Pt to discuss the recommendation of [facility] for home therapy  Pt reported not being sure if he wants [facility] and will follow up with CM regarding his final decision  MIGUEL will continue to follow

## 2021-04-14 NOTE — PHYSICAL THERAPY NOTE
PHYSICAL THERAPY NOTE          Patient Name: Javon Goldberg  EXGQE'G Date: 4/14/2021 04/14/21 1339   PT Last Visit   PT Visit Date 04/14/21   Note Type   Note Type Treatment   Pain Assessment   Pain Assessment Tool 0-10   Pain Score 8   Pain Location/Orientation Orientation: Left; Location: Groin   Restrictions/Precautions   Weight Bearing Precautions Per Order No   Other Precautions Fall Risk;Multiple lines;Pain  (wound vac)   General   Chart Reviewed Yes   Response to Previous Treatment Patient with no complaints from previous session  Family/Caregiver Present No   Cognition   Overall Cognitive Status WFL   Arousal/Participation Alert   Attention Attends with cues to redirect   Orientation Level Oriented X4   Memory Decreased recall of precautions   Following Commands Follows one step commands with increased time or repetition   Comments Pt minimally self limiting, requires encouragement  Subjective   Subjective Pt pleasant and agreeable to participate in therapy session  Bed Mobility   Sit to Supine 6  Modified independent   Additional items HOB elevated; Bedrails; Increased time required   Additional Comments OOB in chair upon PT arrival   Pt left supine in bed with all needs in reach at end of therapy session  Transfers   Sit to Stand 5  Supervision   Additional items Increased time required;Verbal cues   Stand to Sit 5  Supervision   Additional items Increased time required;Verbal cues   Additional Comments Transfers without AD  VC for hand placement and safety  Ambulation/Elevation   Gait pattern Excessively slow; Short stride  (increased lateral sway 2/2 body habitus)   Gait Assistance 5  Supervision   Assistive Device None   Distance 120 ft x 2   Stair Management Assistance 5  Supervision   Stair Management Technique One rail R;Step to pattern; Foreward   Number of Stairs 2  (limited by lines)   Balance   Static Sitting Fair +   Dynamic Sitting Fair   Static Standing Fair -   Dynamic Standing Fair -   Ambulatory Fair -   Endurance Deficit   Endurance Deficit Yes   Endurance Deficit Description fatigue   Activity Tolerance   Activity Tolerance Patient limited by fatigue   Nurse Made Aware RN cleared pt to be seen by PT   Assessment   Prognosis Good   Problem List Decreased strength;Decreased endurance; Impaired balance;Pain   Assessment Pt seen for PT treatment session with focus on bed mobility, functional transfers, functional mobility  Pt making good progress toward goals this session  Pt able to ambulate household+ distances and negotiate steps without hands on assist this session  Pt with no LOB during upright mobility  Pt left supine in bed with all needs in reach  Pt denies any concerns regarding mobility upon DC  PT to DC pt as pt has no further acute skilled PT needs and recommending continued participation in functional mobility with staff while in hospital and HHPT once medically cleared  The patient's AM-PAC Basic Mobility Inpatient Short Form Raw Score is 20, Standardized Score is 43 99  A standardized score greater than 42 9 suggests the patient may benefit from discharge to post-acute rehabilitation services  Please also refer to the recommendation of the Physical Therapist for safe discharge planning     Barriers to Discharge None   Goals   Patient Goals to get better   Plan   Progress Discontinue PT   Recommendation   PT Discharge Recommendation Home with home health rehabilitation   Equipment Recommended Cane   PT - OK to Discharge Yes  (once medically cleared)   AM-PAC Basic Mobility Inpatient   Turning in Bed Without Bedrails 4   Lying on Back to Sitting on Edge of Flat Bed 4   Moving Bed to Chair 3   Standing Up From Chair 3   Walk in Room 3   Climb 3-5 Stairs 3   Basic Mobility Inpatient Raw Score 20   Basic Mobility Standardized Score 43 99     Charlotte Webber, PT, DPT

## 2021-04-14 NOTE — PROGRESS NOTES
Progress Note - General Surgery   Javon Goldberg 45 y o  male MRN: [id number]  Unit/Bed#: [facility] 512-01 Encounter: [id number]    Assessment:  45 y o  M with L thigh soft tissue mass s/p excision and vac placement (4/6/21)    Afebrile  VSS  425 cc serosanguinous drainage out of wound vac      Plan:  Continue diabetic diet  D/c IV abx  Trend WBC/fever curve  Tuesday/Thursday/Sunday Vac changes  APS following for pain control  DVT ppx  OOB/Ambulate  CM for dispo planning     Subjective/Objective     Subjective: No acute events overnight  Patient is still using his PCA-d  No fevers or chills  Tolerating his diet  Objective:     Blood pressure 131/64, pulse 62, temperature 98 °F (36 7 °C), resp  rate 20, height 5' 6 75" (1 695 m), weight (!) 0 4 kg (14 1 oz), SpO2 (!) 84 %  ,Body mass index is 0 14 kg/m²  Intake/Output Summary (Last 24 hours) at 4/14/2021 0747  Last data filed at 4/14/2021 0541  Gross per 24 hour   Intake 680 7 ml   Output 4130 ml   Net -3449 3 ml       Invasive Devices     Peripheral Intravenous Line            Peripheral IV 04/12/21 Right Wrist 1 day          Drain            Negative Pressure Wound Therapy (V A C ) Other (Comment) 7 days                Physical Exam:   NAD, alert and oriented x3  Normocephalic, atraumatic  MMM, EOMI, PERRLA  Norm resp effort on RA  RRR  Abd soft, NT/ND  L groin wound vac in place with serosanguinous drainage in canister     No calf tenderness or peripheral edema  Motor/sensation intact in distal extremities  CN grossly intact  -rash/lesions      Lab, Imaging and other studies:  CBC:   Lab Results   Component Value Date    WBC 13 33 (H) 04/14/2021    HGB 9 6 (L) 04/14/2021    HCT 31 1 (L) 04/14/2021    MCV 90 04/14/2021     (H) 04/14/2021    MCH 27 7 04/14/2021    MCHC 30 9 (L) 04/14/2021    RDW 13 4 04/14/2021    MPV 9 2 04/14/2021    NRBC 0 04/14/2021   , CMP: No results found for: SODIUM, K, CL, CO2, ANIONGAP, BUN, CREATININE, GLUCOSE, CALCIUM, AST, ALT, ALKPHOS, PROT, BILITOT, EGFR  VTE Pharmacologic Prophylaxis: Enoxaparin (Lovenox)  VTE Mechanical Prophylaxis: sequential compression device

## 2021-04-14 NOTE — CASE MANAGEMENT
CM provided a follow up with Pt regarding his SNF choices  Pt reported misplacing the previously provided SNF list and didn't have an opportunity to select facilities  CM provided Pt with another SNF list for review and will continue to follow

## 2021-04-14 NOTE — PLAN OF CARE
Problem: PHYSICAL THERAPY ADULT  Goal: Performs mobility at highest level of function for planned discharge setting  See evaluation for individualized goals  Description: Treatment/Interventions: Functional transfer training, LE strengthening/ROM, Elevations, Therapeutic exercise, Endurance training, Patient/family training, Equipment eval/education, Bed mobility, Gait training  Equipment Recommended: Rasheed       See flowsheet documentation for full assessment, interventions and recommendations  Outcome: Adequate for Discharge  Note: Prognosis: Good  Problem List: Decreased strength, Decreased endurance, Impaired balance, Pain  Assessment: Pt seen for PT treatment session with focus on bed mobility, functional transfers, functional mobility  Pt making good progress toward goals this session  Pt able to ambulate household+ distances and negotiate steps without hands on assist this session  Pt with no LOB during upright mobility  Pt left supine in bed with all needs in reach  Pt denies any concerns regarding mobility upon DC  PT to DC pt as pt has no further acute skilled PT needs and recommending continued participation in functional mobility with staff while in hospital and HHPT once medically cleared  The patient's AM-PAC Basic Mobility Inpatient Short Form Raw Score is 20, Standardized Score is 43 99  A standardized score greater than 42 9 suggests the patient may benefit from discharge to post-acute rehabilitation services  Please also refer to the recommendation of the Physical Therapist for safe discharge planning  Barriers to Discharge: None     PT Discharge Recommendation: Post-Acute Rehabilitation Services     PT - OK to Discharge: Yes(once medically cleared)    See flowsheet documentation for full assessment

## 2021-04-15 LAB
BASOPHILS # BLD AUTO: 0.06 THOUSANDS/ΜL (ref 0–0.1)
BASOPHILS NFR BLD AUTO: 1 % (ref 0–1)
EOSINOPHIL # BLD AUTO: 0.42 THOUSAND/ΜL (ref 0–0.61)
EOSINOPHIL NFR BLD AUTO: 3 % (ref 0–6)
ERYTHROCYTE [DISTWIDTH] IN BLOOD BY AUTOMATED COUNT: 13.5 % (ref 11.6–15.1)
GLUCOSE SERPL-MCNC: 155 MG/DL (ref 65–140)
GLUCOSE SERPL-MCNC: 169 MG/DL (ref 65–140)
GLUCOSE SERPL-MCNC: 207 MG/DL (ref 65–140)
GLUCOSE SERPL-MCNC: 241 MG/DL (ref 65–140)
HCT VFR BLD AUTO: 31.7 % (ref 36.5–49.3)
HGB BLD-MCNC: 9.9 G/DL (ref 12–17)
IMM GRANULOCYTES # BLD AUTO: 0.1 THOUSAND/UL (ref 0–0.2)
IMM GRANULOCYTES NFR BLD AUTO: 1 % (ref 0–2)
LYMPHOCYTES # BLD AUTO: 1.64 THOUSANDS/ΜL (ref 0.6–4.47)
LYMPHOCYTES NFR BLD AUTO: 13 % (ref 14–44)
MCH RBC QN AUTO: 28.2 PG (ref 26.8–34.3)
MCHC RBC AUTO-ENTMCNC: 31.2 G/DL (ref 31.4–37.4)
MCV RBC AUTO: 90 FL (ref 82–98)
MONOCYTES # BLD AUTO: 0.92 THOUSAND/ΜL (ref 0.17–1.22)
MONOCYTES NFR BLD AUTO: 7 % (ref 4–12)
NEUTROPHILS # BLD AUTO: 9.75 THOUSANDS/ΜL (ref 1.85–7.62)
NEUTS SEG NFR BLD AUTO: 75 % (ref 43–75)
NRBC BLD AUTO-RTO: 0 /100 WBCS
PLATELET # BLD AUTO: 514 THOUSANDS/UL (ref 149–390)
PMV BLD AUTO: 9.2 FL (ref 8.9–12.7)
RBC # BLD AUTO: 3.51 MILLION/UL (ref 3.88–5.62)
WBC # BLD AUTO: 12.89 THOUSAND/UL (ref 4.31–10.16)

## 2021-04-15 PROCEDURE — 82948 REAGENT STRIP/BLOOD GLUCOSE: CPT

## 2021-04-15 PROCEDURE — 85025 COMPLETE CBC W/AUTO DIFF WBC: CPT | Performed by: SURGERY

## 2021-04-15 PROCEDURE — 99024 POSTOP FOLLOW-UP VISIT: CPT | Performed by: SURGERY

## 2021-04-15 RX ORDER — POLYETHYLENE GLYCOL 3350 17 G/17G
17 POWDER, FOR SOLUTION ORAL DAILY PRN
Status: DISCONTINUED | OUTPATIENT
Start: 2021-04-15 | End: 2021-04-18

## 2021-04-15 RX ADMIN — INSULIN LISPRO 4 UNITS: 100 INJECTION, SOLUTION INTRAVENOUS; SUBCUTANEOUS at 22:58

## 2021-04-15 RX ADMIN — HYDROMORPHONE HYDROCHLORIDE 1 MG: 1 INJECTION, SOLUTION INTRAMUSCULAR; INTRAVENOUS; SUBCUTANEOUS at 22:58

## 2021-04-15 RX ADMIN — ATORVASTATIN CALCIUM 80 MG: 80 TABLET, FILM COATED ORAL at 17:01

## 2021-04-15 RX ADMIN — ACETAMINOPHEN 975 MG: 325 TABLET ORAL at 22:58

## 2021-04-15 RX ADMIN — POLYETHYLENE GLYCOL 3350 17 G: 17 POWDER, FOR SOLUTION ORAL at 09:30

## 2021-04-15 RX ADMIN — HYDROMORPHONE HYDROCHLORIDE 6 MG: 2 TABLET ORAL at 04:04

## 2021-04-15 RX ADMIN — ENOXAPARIN SODIUM 60 MG: 60 INJECTION SUBCUTANEOUS at 09:24

## 2021-04-15 RX ADMIN — METHOCARBAMOL TABLETS 750 MG: 750 TABLET, COATED ORAL at 23:01

## 2021-04-15 RX ADMIN — INSULIN LISPRO 4 UNITS: 100 INJECTION, SOLUTION INTRAVENOUS; SUBCUTANEOUS at 07:46

## 2021-04-15 RX ADMIN — ACETAMINOPHEN 975 MG: 325 TABLET ORAL at 05:29

## 2021-04-15 RX ADMIN — DOCUSATE SODIUM AND SENNOSIDES 1 TABLET: 8.6; 5 TABLET ORAL at 22:58

## 2021-04-15 RX ADMIN — TICAGRELOR 90 MG: 90 TABLET ORAL at 09:24

## 2021-04-15 RX ADMIN — ENOXAPARIN SODIUM 60 MG: 60 INJECTION SUBCUTANEOUS at 20:37

## 2021-04-15 RX ADMIN — HYDROMORPHONE HYDROCHLORIDE 6 MG: 2 TABLET ORAL at 20:38

## 2021-04-15 RX ADMIN — METHOCARBAMOL TABLETS 750 MG: 750 TABLET, COATED ORAL at 17:01

## 2021-04-15 RX ADMIN — INSULIN LISPRO 8 UNITS: 100 INJECTION, SOLUTION INTRAVENOUS; SUBCUTANEOUS at 17:04

## 2021-04-15 RX ADMIN — CHLORHEXIDINE GLUCONATE 0.12% ORAL RINSE 15 ML: 1.2 LIQUID ORAL at 09:25

## 2021-04-15 RX ADMIN — GABAPENTIN 100 MG: 100 CAPSULE ORAL at 09:24

## 2021-04-15 RX ADMIN — TICAGRELOR 90 MG: 90 TABLET ORAL at 20:37

## 2021-04-15 RX ADMIN — HYDROMORPHONE HYDROCHLORIDE 6 MG: 2 TABLET ORAL at 09:30

## 2021-04-15 RX ADMIN — GABAPENTIN 100 MG: 100 CAPSULE ORAL at 20:37

## 2021-04-15 RX ADMIN — ASPIRIN 81 MG: 81 TABLET, CHEWABLE ORAL at 09:24

## 2021-04-15 RX ADMIN — HYDROMORPHONE HYDROCHLORIDE 6 MG: 2 TABLET ORAL at 14:53

## 2021-04-15 RX ADMIN — HYDROMORPHONE HYDROCHLORIDE 1 MG: 1 INJECTION, SOLUTION INTRAMUSCULAR; INTRAVENOUS; SUBCUTANEOUS at 17:09

## 2021-04-15 RX ADMIN — METHOCARBAMOL TABLETS 750 MG: 750 TABLET, COATED ORAL at 05:29

## 2021-04-15 RX ADMIN — METHOCARBAMOL TABLETS 750 MG: 750 TABLET, COATED ORAL at 12:09

## 2021-04-15 RX ADMIN — INSULIN LISPRO 4 UNITS: 100 INJECTION, SOLUTION INTRAVENOUS; SUBCUTANEOUS at 12:09

## 2021-04-15 RX ADMIN — GABAPENTIN 100 MG: 100 CAPSULE ORAL at 17:01

## 2021-04-15 NOTE — PROGRESS NOTES
Progress Note - Acute Pain Service    Army Maki 45 y o  male MRN: [id number]  Unit/Bed#: [facility] 512-01 Encounter: [id number]      Assessment:   Principal Problem:    Left groin mass  Active Problems:    CAD (coronary artery disease)    Class 3 severe obesity due to excess calories with serious comorbidity and body mass index (BMI) of 50 0 to 59 9 in adult [facility])    Army Maki is a 45 y o  male  Admitted on 4/6/21 for mass excision of hidradenitis in the left proximal thigh with VAC dressing placement   Patient's pain has been difficult to control, therefore APS consulted        Plan:    Continue Tylenol 975 mg p o  q 8 hours scheduled   Continue Dilaudid 4 mg p o  q 4 hours p r n  moderate pain   Continue Dilaudid 6 mg p o  q 4 hours p r n  severe pain   Change Dilaudid to 0 5 mg IV q 3 hours p r n  breakthrough pain   Increase Neurontin to 200 mg p o  t i d  scheduled   Continue Robaxin 750 mg p o  q 6 hours scheduled   Continue bowel regimen to avoid opioid induced constipation  APS will continue to follow  Please call  / [phone] or [facility] (/ between [phone] and on weekends) with questions or concerns    Pain History  Current pain location(s):   Left leg  Pain Scale:    9/10  Quality:  burning  24 hour history:  Dilaudid PCA stopped yesterday  Continues to complain of significant pain and left leg surgical site  Opioid requirement previous 24 hours:   Dilaudid 24 mg p o , Dilaudid 2 mg IV, Dilaudid  5 2 mg via PCA      Meds/Allergies   all current active meds have been reviewed, current meds:   Current Facility-Administered Medications   Medication Dose Route Frequency    acetaminophen (TYLENOL) tablet 975 mg  975 mg Oral Q8H [address]    aspirin chewable tablet 81 mg  81 mg Oral Daily    atorvastatin (LIPITOR) tablet 80 mg  80 mg Oral Daily With Dinner    chlorhexidine (PERIDEX) 0 12 % oral rinse 15 mL  15 mL Swish & Spit Q12H [address]  enoxaparin (LOVENOX) subcutaneous injection 60 mg  60 mg Subcutaneous Q12H [address]    gabapentin (NEURONTIN) capsule 100 mg  100 mg Oral TID    HYDROmorphone (DILAUDID) injection 1 mg  1 mg Intravenous Q3H PRN    HYDROmorphone (DILAUDID) tablet 4 mg  4 mg Oral Q4H PRN    Or    HYDROmorphone (DILAUDID) tablet 6 mg  6 mg Oral Q4H PRN    insulin lispro (HumaLOG) 100 units/mL subcutaneous injection 4-20 Units  4-20 Units Subcutaneous 4x Daily (AC & HS)    methocarbamol (ROBAXIN) tablet 750 mg  750 mg Oral Q6H SHAZIA    naloxone (NARCAN) injection 0 1 mg  0 1 mg Intravenous Q3 min PRN    polyethylene glycol (MIRALAX) packet 17 g  17 g Oral Daily PRN    senna-docusate sodium (SENOKOT S) 8 6-50 mg per tablet 1 tablet  1 tablet Oral HS    sodium chloride (OCEAN) 0 65 % nasal spray 1 spray  1 spray Each Nare Q1H PRN    ticagrelor (BRILINTA) tablet 90 mg  90 mg Per NG Tube Q12H [address]    and PTA meds:   Prior to Admission Medications   Prescriptions Last Dose Informant Patient Reported? Taking?    Omega-3 Fatty Acids (fish oil) 1,000 mg 4/5/2021 at 0500  No Yes   Sig: Take 1 capsule (1,000 mg total) by mouth 2 (two) times a day   Ticagrelor (BRILINTA PO) 4/5/2021 at 0500  Yes Yes   Sig: Take by mouth   amLODIPine (NORVASC) 10 mg tablet 4/5/2021 at 0500  No Yes   Sig: take 1 tablet by mouth once daily   aspirin (ECOTRIN LOW STRENGTH) 81 mg EC tablet 4/1/2021  No Yes   Sig: Take 1 tablet (81 mg total) by mouth daily   atorvastatin (LIPITOR) 80 mg tablet 4/4/2021 at 1900  No Yes   Sig: Take 1 tablet (80 mg total) by mouth daily   carvedilol (COREG) 6 25 mg tablet 4/5/2021 at 0500  No Yes   Sig: Take 1 tablet (6 25 mg total) by mouth 2 (two) times a day with meals   clindamycin (CLEOCIN) 150 mg capsule 4/5/2021 at 1700  No Yes   Sig: Take 1 capsule (150 mg total) by mouth every 12 (twelve) hours for 6 days   glimepiride (AMARYL) 4 mg tablet 4/5/2021 at 0500  No Yes   Sig: Take 1 tablet (4 mg total) by mouth daily with breakfast   lisinopril (ZESTRIL) 10 mg tablet 4/5/2021 at 0500  No Yes   Sig: Take 1 tablet (10 mg total) by mouth daily   metFORMIN (GLUCOPHAGE) 1000 MG tablet 4/5/2021 at 0500  No Yes   Sig: Take 1 tablet (1,000 mg total) by mouth 2 (two) times a day with meals      Facility-Administered Medications: None       Allergies   Allergen Reactions    Amoxicillin Hives       Objective     HR:  [51-74] 58  Resp:  [14-18] 18  BP: ()/(53-72) 126/53    Physical Exam  Vitals signs and nursing note reviewed  Constitutional:       General: He is awake  He is not in acute distress  Skin:     General: Skin is warm and dry  Neurological:      Mental Status: He is alert and oriented to person, place, and time  GCS: GCS eye subscore is 4  GCS verbal subscore is 5  GCS motor subscore is 6  Psychiatric:         Behavior: Behavior is cooperative  Lab Results:   Results from last 7 days   Lab Units 04/15/21  0533   WBC Thousand/uL 12 89*   HEMOGLOBIN g/dL 9 9*   HEMATOCRIT % 31 7*   PLATELETS Thousands/uL 514*      Results from last 7 days   Lab Units 04/12/21  0522   POTASSIUM mmol/L 4 0   CHLORIDE mmol/L 98*   CO2 mmol/L 31   BUN mg/dL 12   CREATININE mg/dL 1 00   CALCIUM mg/dL 9 2       Imaging Studies: I have personally reviewed pertinent reports  EKG, Pathology, and Other Studies: I have personally reviewed pertinent reports  Counseling / Coordination of Care  Total floor / unit time spent today 30 minutes  Greater than 50% of total time was spent with the patient and / or family counseling and / or coordination of care  A description of the counseling / coordination of care:  Patient interview, physical examination, review of medical record, review of imaging and laboratory data, development of pain management plan, discussion of pain management plan with patient and primary service  Please note that the APS provides consultative services regarding pain management only    With the exception of ketamine and epidural infusions and except when indicated, final decisions regarding starting or changing doses of analgesic medications are at the discretion of the consulting service  Off hours consultation and/or medication management is generally not available      Ellie Vasquez PA-C  Acute Pain Service

## 2021-04-15 NOTE — PROGRESS NOTES
Progress Note - General Surgery   Homero Mack 45 y o  male MRN: [id number]  Unit/Bed#: [facility] 512-01 Encounter: [id number]    Assessment:  45 y o  M with L thigh soft tissue mass s/p excision and vac placement (4/6/21)    Afebrile  VSS  500 cc serosanguinous drainage out of wound vac      Plan:  Continue diabetic diet  Bowel regimen  Trend WBC/fever curve  Tuesday/Thursday/Sunday Vac changes  APS following for pain control  DVT ppx  OOB/Ambulate  CM for dispo planning     Subjective/Objective     Subjective:  No acute events overnight  Patient is off of the PCA  States that he still has pain  Denies any chest pain or shortness of breath  No nausea or vomiting  He is constipated crown  Objective:     Blood pressure 96/53, pulse 74, temperature 98 °F (36 7 °C), resp  rate 14, height 5' 6 75" (1 695 m), weight (!) 0 03 kg (1 1 oz), SpO2 93 %  ,Body mass index is 0 01 kg/m²  Intake/Output Summary (Last 24 hours) at 4/15/2021 0612  Last data filed at 4/15/2021 0538  Gross per 24 hour   Intake 696 ml   Output 4350 ml   Net -3654 ml       Invasive Devices     Peripheral Intravenous Line            Peripheral IV 04/12/21 Right Wrist 2 days          Drain            Negative Pressure Wound Therapy (V A C ) Other (Comment) 8 days                Physical Exam:   General: AAOx3, NAD  HEENT: atraumatic, non-icteric sclera  Card: RRR, not tachycardic  Pulm: on RA, not tachypnic  Abd: Soft, non-tender, non distended  LE:  Left lower extremity wound VAC in place without any leak  Output as above  Wound is otherwise clean dry and intact periwound without any signs of infection  Integumentary: no rashes          Lab, Imaging and other studies:  CBC:   No results found for: WBC, HGB, HCT, MCV, PLT, ADJUSTEDWBC, MCH, MCHC, RDW, MPV, NRBC, CMP: No results found for: SODIUM, K, CL, CO2, ANIONGAP, BUN, CREATININE, GLUCOSE, CALCIUM, AST, ALT, ALKPHOS, PROT, BILITOT, EGFR  VTE Pharmacologic Prophylaxis: Enoxaparin (Lovenox)  VTE Mechanical Prophylaxis: sequential compression device

## 2021-04-15 NOTE — QUICK NOTE
Nurse-Patient-Provider rounds were completed with the patient's nurse today, Dileep Michelle  We discussed the plan is to continue oral diet as tolerated  Continue local wound care to left groin/thigh wound with VAC therapy; plan for next VAC dressing change at bedside on 04/16/2021  Tentative plan to return to the OR during the week of 34/93/1616 for application of split-thickness skin graft  Appreciate APS evaluation and recommendations; patient reports improved pain control and is taking oral medications primarily  Continue bowel regimen while on opioid therapy  Continue current anti-platelet therapy in setting of CAD and recent coronary stent placement  Continue to monitor CBC and BMP  We reviewed all of the invasive devices/lines/telemetry orders   - None  DVT Prophylaxis:  - Lovenox and SCDs  Pain Assessment / Plan:  - Continue current analgesic regimen  Mobility Assessment / Plan:  - Activity as tolerated  Goals / Barriers for discharge:  - Not yet appropriate for discharge while awaiting further operative intervention during the week of 04/19/2021   - Case management following; case and discharge needs discussed  All questions and concerns were addressed  I spent greater than 14 minutes reviewing the plan with the patient and the nurse, and coordinating care for the day      Governor Dk PA-C  4/15/2021 11:36 AM

## 2021-04-15 NOTE — RESTORATIVE TECHNICIAN NOTE
Restorative Specialist Mobility Note       Activity: Other (Comment)(Pt refusing ambulation at this time)

## 2021-04-16 LAB
ANION GAP SERPL CALCULATED.3IONS-SCNC: 5 MMOL/L (ref 4–13)
BUN SERPL-MCNC: 12 MG/DL (ref 5–25)
CALCIUM SERPL-MCNC: 8.7 MG/DL (ref 8.3–10.1)
CHLORIDE SERPL-SCNC: 104 MMOL/L (ref 100–108)
CO2 SERPL-SCNC: 30 MMOL/L (ref 21–32)
CREAT SERPL-MCNC: 1.03 MG/DL (ref 0.6–1.3)
ERYTHROCYTE [DISTWIDTH] IN BLOOD BY AUTOMATED COUNT: 13.7 % (ref 11.6–15.1)
GFR SERPL CREATININE-BSD FRML MDRD: 91 ML/MIN/1.73SQ M
GLUCOSE SERPL-MCNC: 139 MG/DL (ref 65–140)
GLUCOSE SERPL-MCNC: 156 MG/DL (ref 65–140)
GLUCOSE SERPL-MCNC: 180 MG/DL (ref 65–140)
GLUCOSE SERPL-MCNC: 215 MG/DL (ref 65–140)
GLUCOSE SERPL-MCNC: 248 MG/DL (ref 65–140)
HCT VFR BLD AUTO: 32 % (ref 36.5–49.3)
HGB BLD-MCNC: 9.9 G/DL (ref 12–17)
MCH RBC QN AUTO: 27.7 PG (ref 26.8–34.3)
MCHC RBC AUTO-ENTMCNC: 30.9 G/DL (ref 31.4–37.4)
MCV RBC AUTO: 89 FL (ref 82–98)
PLATELET # BLD AUTO: 533 THOUSANDS/UL (ref 149–390)
PMV BLD AUTO: 9 FL (ref 8.9–12.7)
POTASSIUM SERPL-SCNC: 4 MMOL/L (ref 3.5–5.3)
RBC # BLD AUTO: 3.58 MILLION/UL (ref 3.88–5.62)
SODIUM SERPL-SCNC: 139 MMOL/L (ref 136–145)
WBC # BLD AUTO: 14.26 THOUSAND/UL (ref 4.31–10.16)

## 2021-04-16 PROCEDURE — 99024 POSTOP FOLLOW-UP VISIT: CPT | Performed by: SURGERY

## 2021-04-16 PROCEDURE — 99232 SBSQ HOSP IP/OBS MODERATE 35: CPT | Performed by: PHYSICIAN ASSISTANT

## 2021-04-16 PROCEDURE — 85027 COMPLETE CBC AUTOMATED: CPT | Performed by: SURGERY

## 2021-04-16 PROCEDURE — 80048 BASIC METABOLIC PNL TOTAL CA: CPT | Performed by: PHYSICIAN ASSISTANT

## 2021-04-16 PROCEDURE — 82948 REAGENT STRIP/BLOOD GLUCOSE: CPT

## 2021-04-16 PROCEDURE — 99223 1ST HOSP IP/OBS HIGH 75: CPT | Performed by: INTERNAL MEDICINE

## 2021-04-16 RX ORDER — HYDROMORPHONE HCL/PF 1 MG/ML
1 SYRINGE (ML) INJECTION EVERY 4 HOURS PRN
Status: DISCONTINUED | OUTPATIENT
Start: 2021-04-16 | End: 2021-04-23

## 2021-04-16 RX ORDER — HYDROMORPHONE HCL/PF 1 MG/ML
1 SYRINGE (ML) INJECTION ONCE
Status: COMPLETED | OUTPATIENT
Start: 2021-04-16 | End: 2021-04-16

## 2021-04-16 RX ORDER — GABAPENTIN 100 MG/1
200 CAPSULE ORAL 3 TIMES DAILY
Status: DISCONTINUED | OUTPATIENT
Start: 2021-04-16 | End: 2021-04-28

## 2021-04-16 RX ORDER — CARVEDILOL 6.25 MG/1
6.25 TABLET ORAL 2 TIMES DAILY WITH MEALS
Status: DISCONTINUED | OUTPATIENT
Start: 2021-04-16 | End: 2021-04-29 | Stop reason: HOSPADM

## 2021-04-16 RX ADMIN — METHOCARBAMOL TABLETS 750 MG: 750 TABLET, COATED ORAL at 17:02

## 2021-04-16 RX ADMIN — METHOCARBAMOL TABLETS 750 MG: 750 TABLET, COATED ORAL at 23:31

## 2021-04-16 RX ADMIN — ATORVASTATIN CALCIUM 80 MG: 80 TABLET, FILM COATED ORAL at 16:56

## 2021-04-16 RX ADMIN — CARVEDILOL 6.25 MG: 6.25 TABLET, FILM COATED ORAL at 16:56

## 2021-04-16 RX ADMIN — ACETAMINOPHEN 975 MG: 325 TABLET ORAL at 22:11

## 2021-04-16 RX ADMIN — CHLORHEXIDINE GLUCONATE 0.12% ORAL RINSE 15 ML: 1.2 LIQUID ORAL at 20:49

## 2021-04-16 RX ADMIN — HYDROMORPHONE HYDROCHLORIDE 1 MG: 1 INJECTION, SOLUTION INTRAMUSCULAR; INTRAVENOUS; SUBCUTANEOUS at 22:08

## 2021-04-16 RX ADMIN — INSULIN LISPRO 4 UNITS: 100 INJECTION, SOLUTION INTRAVENOUS; SUBCUTANEOUS at 07:34

## 2021-04-16 RX ADMIN — ENOXAPARIN SODIUM 60 MG: 60 INJECTION SUBCUTANEOUS at 09:40

## 2021-04-16 RX ADMIN — GABAPENTIN 200 MG: 100 CAPSULE ORAL at 16:56

## 2021-04-16 RX ADMIN — DOCUSATE SODIUM AND SENNOSIDES 1 TABLET: 8.6; 5 TABLET ORAL at 22:11

## 2021-04-16 RX ADMIN — INSULIN LISPRO 8 UNITS: 100 INJECTION, SOLUTION INTRAVENOUS; SUBCUTANEOUS at 22:13

## 2021-04-16 RX ADMIN — HYDROMORPHONE HYDROCHLORIDE 1 MG: 1 INJECTION, SOLUTION INTRAMUSCULAR; INTRAVENOUS; SUBCUTANEOUS at 16:05

## 2021-04-16 RX ADMIN — HYDROMORPHONE HYDROCHLORIDE 1 MG: 1 INJECTION, SOLUTION INTRAMUSCULAR; INTRAVENOUS; SUBCUTANEOUS at 17:17

## 2021-04-16 RX ADMIN — HYDROMORPHONE HYDROCHLORIDE 6 MG: 2 TABLET ORAL at 05:47

## 2021-04-16 RX ADMIN — HYDROMORPHONE HYDROCHLORIDE 4 MG: 2 TABLET ORAL at 14:35

## 2021-04-16 RX ADMIN — LIDOCAINE HYDROCHLORIDE 644 MG: 10 INJECTION, SOLUTION EPIDURAL; INFILTRATION; INTRACAUDAL; PERINEURAL at 14:00

## 2021-04-16 RX ADMIN — TICAGRELOR 90 MG: 90 TABLET ORAL at 09:40

## 2021-04-16 RX ADMIN — TICAGRELOR 90 MG: 90 TABLET ORAL at 20:49

## 2021-04-16 RX ADMIN — METHOCARBAMOL TABLETS 750 MG: 750 TABLET, COATED ORAL at 11:21

## 2021-04-16 RX ADMIN — ACETAMINOPHEN 975 MG: 325 TABLET ORAL at 14:00

## 2021-04-16 RX ADMIN — ENOXAPARIN SODIUM 60 MG: 60 INJECTION SUBCUTANEOUS at 20:49

## 2021-04-16 RX ADMIN — INSULIN LISPRO 8 UNITS: 100 INJECTION, SOLUTION INTRAVENOUS; SUBCUTANEOUS at 11:21

## 2021-04-16 RX ADMIN — HYDROMORPHONE HYDROCHLORIDE 6 MG: 2 TABLET ORAL at 20:49

## 2021-04-16 RX ADMIN — ASPIRIN 81 MG: 81 TABLET, CHEWABLE ORAL at 09:40

## 2021-04-16 RX ADMIN — CHLORHEXIDINE GLUCONATE 0.12% ORAL RINSE 15 ML: 1.2 LIQUID ORAL at 09:40

## 2021-04-16 RX ADMIN — HYDROMORPHONE HYDROCHLORIDE 1 MG: 1 INJECTION, SOLUTION INTRAMUSCULAR; INTRAVENOUS; SUBCUTANEOUS at 07:33

## 2021-04-16 RX ADMIN — HYDROMORPHONE HYDROCHLORIDE 6 MG: 2 TABLET ORAL at 11:25

## 2021-04-16 RX ADMIN — ACETAMINOPHEN 975 MG: 325 TABLET ORAL at 05:44

## 2021-04-16 RX ADMIN — METHOCARBAMOL TABLETS 750 MG: 750 TABLET, COATED ORAL at 05:44

## 2021-04-16 RX ADMIN — INSULIN LISPRO 4 UNITS: 100 INJECTION, SOLUTION INTRAVENOUS; SUBCUTANEOUS at 16:59

## 2021-04-16 RX ADMIN — GABAPENTIN 200 MG: 100 CAPSULE ORAL at 20:49

## 2021-04-16 NOTE — CASE MANAGEMENT
CM met with pt to discuss HHC:SN/PT/OT recommendation  CM confirmed recommendations with therapy  Recommendation at this time is for HHPT  Freedom of choice discussed with pt  Pt requested referral's to [facility] and requested additional blanket referral's as backup in his service location   CM placed HHC referral's via [facility] as requested by pt  CM department will continue to follow

## 2021-04-16 NOTE — QUICK NOTE
Nurse-Patient-Provider rounds were completed with the patient's nurse today, Prashant Myrick  We discussed the plan is to change wound VAC at bedside today, after evaluation/removal of VAC, significant amount of purulent discharge expressed from 9 o'clock position  After discussion with Dr Gunnar Neri, will plan on OR tomorrow 4/17 for washout and debridement of L groin  NPO at midnight with sips with meds  We reviewed all of the invasive devices/lines/telemetry orders   - None  DVT Prophylaxis:  Lovenox and SCDs    Pain Assessment / Plan:  - Continue current analgesic regimen  -Appreciate APS recommendations, Lidocaine gtt prior to dressing change with no benefit, patient did not feel any pain control with gtt  Mobility Assessment / Plan:  - Activity as tolerated  Goals / Barriers for discharge:  Further need for OR intervention included washout and debridement tomorrow 4/17  Case management following; case and discharge needs discussed  All questions and concerns were addressed  I spent greater than 50 minutes reviewing the plan with the patient and the nurse, and coordinating care for the day  Nury Carolina PA-C  4/16/2021       Acute Care Surgery  Bedside V A C  Procedure Note    Location of wound: L Groin    Dressings and Foam removed:  3 Adaptic Dressings and 1 Xeroform  1 Black Foam  0 White Foam    Dimensions of wound: 35 cm x 9 cm x 2 cm    Description of wound: On inspection of the wound today, significant amount of purulent discharge expressed from 9 o'clock position  Wound probed and deloculated, milked discharge until minimal purulence  Wound edges with some mild bleeding  Anterior thigh with sutures intact and in place  The periwound skin remains clean and intact and unremarkable  VAC dressing application:  The periwound skin was cleaned and dried  3 Adaptic dressings and 1 Xeroform, 1 black foam, 0 white foam were cut to size of the wound and placed into the wound   The dressings were then covered with VAC drape  Additional VAC drape and black foam was used to create a bridge to the patient's L anterior thigh and a base for the track pad  The track pad was then placed over the base of black foam  The VAC was then set to 125 mmHg low Continuous suction  The patient tolerated the procedure well and there were no complications  The patient did not require any excisional debridement during today's dressing change  VAC settings:  125 mmHg  Continuous    Wound Images:    9 o'clock position with active purulent drainage, with tracking  Additional Notes: The [facility] sticker placed over the dressing per protocol  The next [facility] dressing change will be planned for OR on 4/17  The [facility] paperwork was completed and give to the case management staff to arrange home VAC therapy  This dressing change took greater than 45 minutes to complete      Dinora Betancourt PA-C  4/16/2021 5:16 PM

## 2021-04-16 NOTE — PROGRESS NOTES
Progress Note - Acute Pain Service    Marlyn Thao 45 y o  male MRN: [id number]  Unit/Bed#: [facility] 512-01 Encounter: [id number]      Assessment:   Principal Problem:    Left groin mass  Active Problems:    CAD (coronary artery disease)    Class 3 severe obesity due to excess calories with serious comorbidity and body mass index (BMI) of 50 0 to 59 9 in [facility])    Marlyn Thao is a 45 y o  male  Admitted on 4/6/21 for mass excision of hidradenitis in the left proximal thigh with VAC dressing placement   Patient's pain has been difficult to control, therefore APS consulted  Plan:    Continue Tylenol 975 mg p o  q 8 hours scheduled   Continue Dilaudid 4 mg p o  q 4 hours p r n  moderate pain   Continue Dilaudid 6 mg p o  q 4 hours p r n  severe pain   Continue Dilaudid 1 mg IV q 3 hours p r n  breakthrough pain   Increase gabapentin to 200 mg p o  t i d  scheduled   Continue Robaxin 750 mg p o  q 6 hours scheduled   Continue bowel regimen to avoid opioid induced constipation   Will start lidocaine infusion 30 minutes prior to [facility] change today and discontinue following procedure  APS will continue to follow  Please call  / [phone] or [facility] Acute Pain [facility] - [facility] (/ between [phone] and on weekends) with questions or concerns    Pain History  Current pain location(s):   Left leg  Pain Scale:    9/10  Quality:  burning  24 hour history:  Patient continues to have pain in his left thigh surgical site  Plan for skin graft next week  Opioid requirement previous 24 hours:   Dilaudid 24 mg p o , Dilaudid 3 mg IV      Meds/Allergies   all current active meds have been reviewed, current meds:   Current Facility-Administered Medications   Medication Dose Route Frequency    acetaminophen (TYLENOL) tablet 975 mg  975 mg Oral Q8H [address]    aspirin chewable tablet 81 mg  81 mg Oral Daily    atorvastatin (LIPITOR) tablet 80 mg  80 mg Oral Daily With [employer] chlorhexidine (PERIDEX) 0 12 % oral rinse 15 mL  15 mL Swish & Spit Q12H [address]    enoxaparin (LOVENOX) subcutaneous injection 60 mg  60 mg Subcutaneous Q12H [address]    gabapentin (NEURONTIN) capsule 100 mg  100 mg Oral TID    HYDROmorphone (DILAUDID) injection 1 mg  1 mg Intravenous Q3H PRN    HYDROmorphone (DILAUDID) tablet 4 mg  4 mg Oral Q4H PRN    Or    HYDROmorphone (DILAUDID) tablet 6 mg  6 mg Oral Q4H PRN    insulin lispro (HumaLOG) 100 units/mL subcutaneous injection 4-20 Units  4-20 Units Subcutaneous 4x Daily (AC & HS)    methocarbamol (ROBAXIN) tablet 750 mg  750 mg Oral Q6H [address]    naloxone (NARCAN) injection 0 1 mg  0 1 mg Intravenous Q3 min PRN    polyethylene glycol (MIRALAX) packet 17 g  17 g Oral Daily PRN    senna-docusate sodium (SENOKOT S) 8 6-50 mg per tablet 1 tablet  1 tablet Oral HS    sodium chloride (OCEAN) 0 65 % nasal spray 1 spray  1 spray Each Nare Q1H PRN    ticagrelor (BRILINTA) tablet 90 mg  90 mg Oral Q12H [address]    and PTA meds:   Prior to Admission Medications   Prescriptions Last Dose Informant Patient Reported? Taking?    Omega-3 Fatty Acids (fish oil) 1,000 mg 4/5/2021 at 0500  No Yes   Sig: Take 1 capsule (1,000 mg total) by mouth 2 (two) times a day   Ticagrelor (BRILINTA PO) 4/5/2021 at 0500  Yes Yes   Sig: Take by mouth   amLODIPine (NORVASC) 10 mg tablet 4/5/2021 at 0500  No Yes   Sig: take 1 tablet by mouth once daily   aspirin (ECOTRIN LOW STRENGTH) 81 mg EC tablet 4/1/2021  No Yes   Sig: Take 1 tablet (81 mg total) by mouth daily   atorvastatin (LIPITOR) 80 mg tablet 4/4/2021 at 1900  No Yes   Sig: Take 1 tablet (80 mg total) by mouth daily   carvedilol (COREG) 6 25 mg tablet 4/5/2021 at 0500  No Yes   Sig: Take 1 tablet (6 25 mg total) by mouth 2 (two) times a day with meals   clindamycin (CLEOCIN) 150 mg capsule 4/5/2021 at 1700  No Yes   Sig: Take 1 capsule (150 mg total) by mouth every 12 (twelve) hours for 6 days   glimepiride (AMARYL) 4 mg tablet 4/5/2021 at 0500 No Yes   Sig: Take 1 tablet (4 mg total) by mouth daily with breakfast   lisinopril (ZESTRIL) 10 mg tablet 4/5/2021 at 0500  No Yes   Sig: Take 1 tablet (10 mg total) by mouth daily   metFORMIN (GLUCOPHAGE) 1000 MG tablet 4/5/2021 at 0500  No Yes   Sig: Take 1 tablet (1,000 mg total) by mouth 2 (two) times a day with meals      Facility-Administered Medications: None       Allergies   Allergen Reactions    Amoxicillin Hives       Objective     Temp:  [97 9 °F (36 6 °C)] 97 9 °F (36 6 °C)  HR:  [67-77] 71  Resp:  [18] 18  BP: (127-131)/(69-72) 131/69    Physical Exam  Vitals signs and nursing note reviewed  Constitutional:       General: He is awake  He is not in acute distress  Skin:     General: Skin is warm and dry  Neurological:      Mental Status: He is alert and oriented to person, place, and time  GCS: GCS eye subscore is 4  GCS verbal subscore is 5  GCS motor subscore is 6  Psychiatric:         Behavior: Behavior is cooperative  Lab Results:   Results from last 7 days   Lab Units 04/16/21  0543   WBC Thousand/uL 14 26*   HEMOGLOBIN g/dL 9 9*   HEMATOCRIT % 32 0*   PLATELETS Thousands/uL 533*      Results from last 7 days   Lab Units 04/16/21  0543   POTASSIUM mmol/L 4 0   CHLORIDE mmol/L 104   CO2 mmol/L 30   BUN mg/dL 12   CREATININE mg/dL 1 03   CALCIUM mg/dL 8 7       Imaging Studies: I have personally reviewed pertinent reports  EKG, Pathology, and Other Studies: I have personally reviewed pertinent reports  Counseling / Coordination of Care  Total floor / unit time spent today 30 minutes  Greater than 50% of total time was spent with the patient and / or family counseling and / or coordination of care   A description of the counseling / coordination of care:  Patient interview, physical examination, review of medical record, review of imaging and laboratory data, development of pain management plan, discussion of pain management plan with patient and primary service  Please note that the APS provides consultative services regarding pain management only  With the exception of ketamine and epidural infusions and except when indicated, final decisions regarding starting or changing doses of analgesic medications are at the discretion of the consulting service  Off hours consultation and/or medication management is generally not available      Destiny Monaco PA-C  Acute Pain Service

## 2021-04-16 NOTE — UTILIZATION REVIEW
Elective Surgical Continued Stay Review    Date: 4/16/21    POD#: 10  Current Patient Class: inpatient    Current Level of Care: med surg since 4/10    Assessment/Plan: 45 y o  male, initial surgery date 4/6/21 s/p mass excision hidradenitis L prox thigh with VAC placed  He was difficult to intubate, and was hypoxic in acute respiratory failure postop, thus remained intubated due to intraoperative bronchospasms  Extubated on 4/7, remained on 10liters NC on 4/8  On 4/9 had some purulence to VAC drainage  On 4/10 he was weaned to [address] [place] [address] NC  On 4/11, weaned to 3liters  4/13 weaned to RA, but remained intermittently hypoxic  4/16: Pain has been difficult to control requiring pain management intervention  Still having 9/10 burning pain, moreso with any movement    Dilaudid PCA was in progress, dc on 4/14 and converted to prn doses  O2 sats are in 80's to 90's on room air, using cpap at night  VAC changed today  LLE periwound skin CDI       Photo from 4/11/21            Vital Signs:   Date/Time  Temp  Pulse  Resp  BP  MAP (mmHg)  SpO2  O2 Flow Rate (L/min)  O2 Device  O2 Interface Device  Patient Position - Orthostatic VS   04/16/21 07:30:48  --  71  18  131/69  90  91 %  --  --  --  --   04/15/21 22:30:44  97 9 °F (36 6 °C)  67  18  127/72  90  91 %  --  None (Room air)  --  Lying   04/15/21 1600  --  --  --  --  --  --  --  None (Room air)  --  --   04/15/21 15:08:55  97 9 °F (36 6 °C)  77  18  127/71  90  93 %  --  --  --  Lying   04/15/21 06:45:05  --  58  18  126/53  77  87 %Abnormal   --  --  --  --   04/14/21 23:22:35  --  74  14  96/53  67  93 %  --  --  --  --   04/14/21 2300  --  --  --  --  --  --  --  --  Face mask  --   04/14/21 2000  --  --  --  --  --  --  --  None (Room air)  --  --   04/14/21 14:33:34  --  69  18  115/72  86  90 %  --  --  --  --   04/14/21 10:52:02  --  51Abnormal   --  115/72  86  91 %  --  --  --  --   04/14/21 07:18:33  98 °F (36 7 °C)  62  20  131/64  86  84 %Abnormal   --  --  --  -- 04/14/21 0545  97 8 °F (36 6 °C)  74  20  130/69  --  91 %  --  None (Room air)  --  --   04/14/21 0100  --  --  --  --  --  --  2 L/min  Nasal cannula  --  --   04/14/21 0059  --  --  --  --  --  74 %Abnormal   --  None (Room                Pertinent Labs/Diagnostic Results:     4/6 echo    LEFT VENTRICLE:  Systolic function was normal  Ejection fraction was estimated to be 65 %  There were no regional wall motion abnormalities      RIGHT VENTRICLE:  The size was normal   Systolic function was normal     4/6 EKG Normal sinus rhythm  Left axis deviation  Cannot rule out Anterior infarct , age undetermined    3/6 PCXR 1  Interval intubation  2   Right basilar opacities possibly atelectasis versus pneumonia or aspiration      4/6 PE study: With moderate compromise by respiratory motion, particularly in the upper lungs, no definite acute pulmonary embolus    Nonenhancing peripheral wedge-shaped area of groundglass opacity in the right lower lobe which has the appearance of a pulmonary infarct    Complete left lower lobe atelectasis and partial right lower lobe and right middle lobe atelectasis    Mild coronary artery calcification compatible with known coronary artery disease       4/7 BLE doppler: normal  Results from last 7 days   Lab Units 04/16/21  0543 04/15/21  0533 04/14/21  0548 04/13/21  0522 04/12/21  0522   WBC Thousand/uL 14 26* 12 89* 13 33* 12 59* 11 08*   HEMOGLOBIN g/dL 9 9* 9 9* 9 6* 10 1* 10 4*   HEMATOCRIT % 32 0* 31 7* 31 1* 32 9* 33 1*   PLATELETS Thousands/uL 533* 514* 482* 448* 431*   NEUTROS ABS Thousands/µL  --  9 75* 9 51* 8 53* 7 68*         Results from last 7 days   Lab Units 04/16/21  0543 04/12/21  0522 04/11/21  0436 04/10/21  0247   SODIUM mmol/L 139 134* 136 134*   POTASSIUM mmol/L 4 0 4 0 4 0 4 4   CHLORIDE mmol/L 104 98* 100 100   CO2 mmol/L 30 31 33* 30   ANION GAP mmol/L 5 5 3* 4   BUN mg/dL 12 12 11 14   CREATININE mg/dL 1 03 1 00 0 95 0 95   EGFR ml/min/1 73sq m 91 94 100 100 CALCIUM mg/dL 8 7 9 2 9 1 8 7         Results from last 7 days   Lab Units 04/16/21  1109 04/16/21  0630 04/15/21  2115 04/15/21  1618 04/15/21  1058 04/15/21  0615 04/14/21  2058 04/14/21  1612 04/14/21  1054 04/14/21  0636 04/13/21  2045 04/13/21  1657   POC GLUCOSE mg/dl 215* 156* 169* 241* 207* 155* 147* 153* 206* 148* 205* 183*     Results from last 7 days   Lab Units 04/16/21  0543 04/12/21  0522 04/11/21  0436 04/10/21  0247   GLUCOSE RANDOM mg/dL 139 152* 139 161*           Medications:   Scheduled Medications:  acetaminophen, 975 mg, Oral, Q8H [facility]  aspirin, 81 mg, Oral, Daily  atorvastatin, 80 mg, Oral, Daily With Dinner  chlorhexidine, 15 mL, Swish & Spit, Q12H [facility]  enoxaparin, 60 mg, Subcutaneous, Q12H [facility]  gabapentin, 200 mg, Oral, TID  insulin lispro, 4-20 Units, Subcutaneous, 4x Daily (AC & HS)  lidocaine IVPB (PALLIATIVE CARE), 4 mg/kg, Intravenous, Once  methocarbamol, 750 mg, Oral, Q6H [facility]  senna-docusate sodium, 1 tablet, Oral, HS  ticagrelor, 90 mg, Oral, Q12H [facility]    ceFAZolin (ANCEF) IVPB (premix in dextrose) 2,000 mg 50 mL   Dose: 2,000 mg  Freq: Every 8 hours Route: IV  Last Dose: 2,000 mg (04/14/21 0541)  Start: 04/07/21 1100 End: 04/14/21 0744    Xopenex/atrovent nebs q6h through 4/12/21    IV ativan 4/11 @0934, 4/13 @1640    Continuous IV Infusions:    HYDROmorphone (DILAUDID) 1 mg/mL 50 mL PCA   Continuous Rate: 0 mg/hr  PCA Dose: 0 2 mg  PCA Lock-out: 6 Minutes  One Hour Limit: 2 mg  Freq: Continuous Route: IV  Last Dose: Stopped (04/14/21 1400)  Start: 04/08/21 0645 End: 04/14/21 1323    PRN Meds:  HYDROmorphone, 1 mg, Intravenous, Q4H PRN used 5 doses on 4/8, 4/9, 4/12, 4/13    Used 8 doses on 4/10; used 2 doses on 4/14, 4/15   And 1 dose on 4/16  HYDROmorphone, 4 mg, Oral, Q4H PRN x 3 on 4/13, x 1 4/14    Or  HYDROmorphone, 6 mg, Oral, Q4H PRN x 3 4/14, x 4 4/15, x 2 4/16  naloxone, 0 1 mg, Intravenous, Q3 min PRN  polyethylene glycol, 17 g, Oral, Daily PRN  sodium chloride, 1 spray, Each Nare, Q1H PRN  PO oxycodone x 3 4/12, x 2 4/13    Discharge Plan: TBD    Network Utilization Review Department  ATTENTION: Please call with any questions or concerns to [phone] and carefully listen to the prompts so that you are directed to the right person  All voicemails are confidential   Shahida Bee all requests for admission clinical reviews, approved or denied determinations and any other requests to dedicated fax number below belonging to the campus where the patient is receiving treatment   List of dedicated fax numbers for the Facilities:  [address] DENIALS (Administrative/Medical Necessity) [phone]   [address] (Maternity/NICU/Pediatrics) [phone]   [address] [place] [address] [address] [facility] [address] Eugenie Gabriel 3892 (  Julianne Biggs [facility]Mary" [facility]) [address] [address] Ender Lennon 1481 [address] [place], [place] [address] [phone]

## 2021-04-16 NOTE — PROGRESS NOTES
Progress Note - General Surgery   Alvares Hali 45 y o  male MRN: [id number]  Unit/Bed#: [facility] 512-01 Encounter: [id number]    Assessment:  45 y o  M with L thigh soft tissue mass s/p excision and vac placement (4/6/21)    Plan:   Diet as tolerated    Maintain VAC -- bedside change today   Pain control -- APS recs appreciated   OOB/ambulate   DVT PPx    Subjective/Objective     Subjective:   No acute events overnight  Pain is overall controlled, but present with most movements  Objective:    Blood pressure 127/72, pulse 67, temperature 97 9 °F (36 6 °C), temperature source Oral, resp  rate 18, height 5' 6 75" (1 695 m), weight (!) 0 03 kg (1 1 oz), SpO2 91 %  ,Body mass index is 0 01 kg/m²        Intake/Output Summary (Last 24 hours) at 4/16/2021 0449  Last data filed at 4/16/2021 0349  Gross per 24 hour   Intake 1440 ml   Output 2200 ml   Net -760 ml       Invasive Devices     Peripheral Intravenous Line            Peripheral IV 04/12/21 Right Wrist 3 days          Drain            Negative Pressure Wound Therapy (V A C ) Other (Comment) 9 days                Physical Exam:   Gen:  NAD  CV:  warm, well-perfused  Lungs: nl effort  Abd:  soft, NT/ND  Ext:  LLE VAC holding suction, periwound skin C/D/I   Neuro: A&Ox3     Results from last 7 days   Lab Units 04/15/21  0533 04/14/21  0548 04/13/21  0522   WBC Thousand/uL 12 89* 13 33* 12 59*   HEMOGLOBIN g/dL 9 9* 9 6* 10 1*   HEMATOCRIT % 31 7* 31 1* 32 9*   PLATELETS Thousands/uL 514* 482* 448*     Results from last 7 days   Lab Units 04/12/21  0522 04/11/21  0436 04/10/21  0247   POTASSIUM mmol/L 4 0 4 0 4 4   CHLORIDE mmol/L 98* 100 100   CO2 mmol/L 31 33* 30   BUN mg/dL 12 11 14   CREATININE mg/dL 1 00 0 95 0 95   CALCIUM mg/dL 9 2 9 1 8 7

## 2021-04-16 NOTE — CONSULTS
Consultation - Cardiology  Luetta Cheadle 45 y o  male MRN: [id number]  Unit/Bed#: [facility] 512-01 Encounter: [id number]          Inpatient consult to Cardiology     Performed by  Paulina Garrett DO     Authorized by Theresia Bosworth, DO            History of Present Illness   Physician Requesting Consult: Amy Mills MD  Reason for Consult / Principal Problem:  Preop risk stratification    Assessment/Plan   Preop risk stratification with a skin graft of left groin wound  -RCRI score 1 (6% of 30 day risk of death, MI, cardiac arrest)  Patient is currently chest pain free  Would recommend to continue with aspirin, Brilinta and atorvastatin in perioperative period     Would recommend to restart home Coreg 6 25 mg b i d     Would recommend to restart amlodipine if blood pressure permits  Would recommend to start lisinopril in postoperative period  HPI: Luetta Cheadle is a 45y o  year old male with past medical history of CAD (PCI to LCX in 2010, SABINE to mid RCA in December of 2020), hypertension, hyperlipidemia, diabetes presented for elective excision of a left thigh mass, postop period was complicated by hypoxic respiratory failure for which required stay intubated initially with eventual extubation  Postop  Also was complicated by hard to control pain  Patient is planned for the skin graft next week  Cardiology is consulted for risk stratification  Patient denies chest pain, dyspnea on exertion  Denies palpitation, orthopnea, paroxysmal nocturnal dyspnea, lower extremity swelling       Primary Cardiologist: Dr Chele Bacon    EKG:       Cardiac testing:   ECHO:   Results for orders placed during the hospital encounter of 04/06/21   Echo complete with contrast if indicated    Narrative Maria Elena [address] [place] [address]  [phone]    Transthoracic Echocardiogram  2D, M-mode, Doppler, and Color Doppler    Study date:  06-Apr-2021    Patient: Quincy Moreno Guillermo Morales  MR number: [id number]  Account number: [de-identified]  : 1980  Age: 45 years  Gender: Male  Status: Inpatient  Location: Bedside  Height: 66 in  Weight: 369 2 lb  BP: 100/ 58 mmHg    Indications: Assess left ventricular function  Diagnoses: J96 91 - Respiratory failure, unspecified with hypoxia    Sonographer:  SIMEON Cruz  Primary Physician:  Nova Burrows MD  Referring Physician:  Antohny Parker  Group:  [facility]  Cardiology Fellow: Kim Bowen MD  Interpreting Physician:  Randall Benjamin MD    SUMMARY    PROCEDURE INFORMATION:  This was a technically difficult study  Intravenous contrast ( 0 6 ml Definity/NSS) was administered  LEFT VENTRICLE:  Systolic function was normal  Ejection fraction was estimated to be 65 %  There were no regional wall motion abnormalities  RIGHT VENTRICLE:  The size was normal   Systolic function was normal     HISTORY: PRIOR HISTORY: Respiratory failure with hypoxia, CAD s/p stent, Hyperlipidemia    PROCEDURE: The procedure was performed at the bedside  This was a routine study  The transthoracic approach was used  The study included complete 2D imaging, M-mode, complete spectral Doppler, and color Doppler  The heart rate was 66 bpm,  at the start of the study  Images were obtained from the parasternal, apical, subcostal, and suprasternal notch acoustic windows  Intravenous contrast ( 0 6 ml Definity/NSS) was administered  Echocardiographic views were limited due to  decreased penetration and patient on mechanical ventilator  This was a technically difficult study  LEFT VENTRICLE: Size was normal  Systolic function was normal  Ejection fraction was estimated to be 65 %  There were no regional wall motion abnormalities  Wall thickness was normal  There was no evidence of concentric hypertrophy    DOPPLER: The transmitral flow pattern was normal  The study was not technically sufficient to allow evaluation of LV diastolic function  RIGHT VENTRICLE: The size was normal  Systolic function was normal     LEFT ATRIUM: Size was normal     RIGHT ATRIUM: Size was normal     MITRAL VALVE: Valve structure was normal  There was normal leaflet separation  DOPPLER: The transmitral velocity was within the normal range  There was no evidence for stenosis  There was no significant regurgitation  AORTIC VALVE: The valve was probably trileaflet  Leaflets exhibited normal thickness and normal cuspal separation  DOPPLER: Transaortic velocity was within the normal range  There was no evidence for stenosis  There was no regurgitation  TRICUSPID VALVE: The valve structure was normal  There was normal leaflet separation  DOPPLER: The transtricuspid velocity was within the normal range  There was no evidence for stenosis  There was trace regurgitation  The tricuspid jet  envelope definition was inadequate for estimation of RV systolic pressure  PULMONIC VALVE: Leaflets exhibited normal thickness, no calcification, and normal cuspal separation  DOPPLER: The transpulmonic velocity was within the normal range  There was no evidence for stenosis  There was no significant  regurgitation  PERICARDIUM: There was no pericardial effusion  AORTA: The root exhibited normal size  SYSTEMIC VEINS: IVC: The inferior vena cava was dilated  Respirophasic changes in dimension were absent      SYSTEM MEASUREMENT TABLES    2D  %FS: 40 76 %  Ao Diam: 3 12 cm  EDV(Teich): 125 68 ml  EF(Teich): 71 22 %  ESV(Teich): 36 17 ml  IVSd: 0 96 cm  LA Area: 18 82 cm2  LA Diam: 4 09 cm  LVEDV MOD A4C: 139 97 ml  LVEF MOD A4C: 73 53 %  LVESV MOD A4C: 37 05 ml  LVIDd: 5 13 cm  LVIDs: 3 04 cm  LVLd A4C: 8 89 cm  LVLs A4C: 6 99 cm  LVPWd: 0 97 cm  RA Area: 19 01 cm2  RVIDd: 3 58 cm  SV MOD A4C: 102 92 ml  SV(Teich): 89 51 ml    MM  TAPSE: 2 34 cm    PW  E' Sept: 0 07 m/s  E/E' Sept: 7 73  MV A Flavio: 0 38 m/s  MV Dec [place]: 2 89 m/s2  MV DecT: 197 93 ms  MV E Flavio: 0 57 m/s  MV E/A Ratio: 1 5  MV PHT: 57 4 ms  MVA By PHT: 3 83 cm2    [address] Accredited Echocardiography [facility]    Prepared and electronically signed by    Darryl Arteaga MD  Signed 07-Apr-2021 10:12:03       No results found for this or any previous visit  CATH:  No results found for this or any previous visit  STRESS TEST:  No results found for this or any previous visit  Review of Systems  ROS as noted above, otherwise 12 point review of systems was performed and is negative       Historical Information   Past Medical History:   Diagnosis Date    Coronary artery disease     Diabetes mellitus ([facility] )     Heart disease     CAD   s/p ptca with 1 stent 2012    Hidradenitis suppurativa     groin    Hyperlipidemia     Hypertension     MI (myocardial infarction) ([facility] )     " silent " M I  in the past    Morbid obesity with BMI of 50 0-59 9, adult ([facility] )     Myocardial infarction ([facility] )     Nicotine dependence     Obesity     Pilonidal cyst      Past Surgical History:   Procedure Laterality Date    CORONARY ANGIOPLASTY WITH STENT PLACEMENT      INCISION AND DRAINAGE OF WOUND N/A 1/24/2017    Procedure: INCISION AND DRAINAGE (I&D) BUTTOCK, PILONIDAL CYST;  Surgeon: Neha Hodgson MD;  Location: [address] [place] [address];  Service:    [address] [place] [address] LEG SURGERY Left     I&D of left leg 2012    DC EXC SKIN BENIG >4 CM TRUNK,ARM,LEG Left 4/6/2021    Procedure: EXCISION THIGH MASS;  Surgeon: Jd Castañeda MD;  [facility]: [facility];  Service: General    DC NEG PRESS WOUND [place], < 50 CM Left 4/6/2021    Procedure: APPLICATION VAC DRESSING THIGH;  Surgeon: Jd Castañeda MD;  [facility];  Service: General     Social History     Substance and Sexual Activity   Alcohol Use Not Currently    Comment: rarely     Social History     Substance and Sexual Activity   Drug Use No     Social History     Tobacco Use   Smoking Status Former Smoker    Packs/day: 1 00    Years: 20 00    Pack years: 20 00    Types: Cigarettes   Smokeless Tobacco Never Used     Meds/Allergies   Hospital Medications:   Current Facility-Administered Medications   Medication Dose Route Frequency    acetaminophen (TYLENOL) tablet 975 mg  975 mg Oral Q8H [address]    aspirin chewable tablet 81 mg  81 mg Oral Daily    atorvastatin (LIPITOR) tablet 80 mg  80 mg Oral Daily With Dinner    chlorhexidine (PERIDEX) 0 12 % oral rinse 15 mL  15 mL Swish & Spit Q12H [address]    enoxaparin (LOVENOX) subcutaneous injection 60 mg  60 mg Subcutaneous Q12H [address]    gabapentin (NEURONTIN) capsule 200 mg  200 mg Oral TID    HYDROmorphone (DILAUDID) injection 1 mg  1 mg Intravenous Q4H PRN    HYDROmorphone (DILAUDID) tablet 4 mg  4 mg Oral Q4H PRN    Or    HYDROmorphone (DILAUDID) tablet 6 mg  6 mg Oral Q4H PRN    insulin lispro (HumaLOG) 100 units/mL subcutaneous injection 4-20 Units  4-20 Units Subcutaneous 4x Daily (AC & HS)    lidocaine (XYLOCAINE) 1 % 644 mg in dextrose 5 % 50 mL IVPB  4 mg/kg Intravenous Once    methocarbamol (ROBAXIN) tablet 750 mg  750 mg Oral Q6H [address]    naloxone (NARCAN) injection 0 1 mg  0 1 mg Intravenous Q3 min PRN    polyethylene glycol (MIRALAX) packet 17 g  17 g Oral Daily PRN    senna-docusate sodium (SENOKOT S) 8 6-50 mg per tablet 1 tablet  1 tablet Oral HS    sodium chloride (OCEAN) 0 65 % nasal spray 1 spray  1 spray Each Nare Q1H PRN    ticagrelor (BRILINTA) tablet 90 mg  90 mg Oral Q12H [address]     Home Medications:   Medications Prior to Admission   Medication    amLODIPine (NORVASC) 10 mg tablet    aspirin (ECOTRIN LOW STRENGTH) 81 mg EC tablet    atorvastatin (LIPITOR) 80 mg tablet    carvedilol (COREG) 6 25 mg tablet    [] clindamycin (CLEOCIN) 150 mg capsule    glimepiride (AMARYL) 4 mg tablet    lisinopril (ZESTRIL) 10 mg tablet    metFORMIN (GLUCOPHAGE) 1000 MG tablet    Omega-3 Fatty Acids (fish oil) 1,000 mg    Ticagrelor (BRILINTA PO)     Allergies   Allergen Reactions    Amoxicillin Hives Objective   Vitals: Blood pressure 131/69, pulse 71, temperature 97 9 °F (36 6 °C), temperature source Oral, resp  rate 18, height 5' 6 75" (1 695 m), weight (!) 161 kg (354 lb 4 5 oz), SpO2 91 %  Orthostatic Blood Pressures      Most Recent Value   Blood Pressure  131/69 filed at 04/16/2021 0730   Patient Position - Orthostatic VS  Lying filed at 04/15/2021 2230          Intake/Output Summary (Last 24 hours) at 4/16/2021 1406  Last data filed at 4/16/2021 1403  Gross per 24 hour   Intake 1660 ml   Output 2140 ml   Net -480 ml     Invasive Devices     Peripheral Intravenous Line            Peripheral IV 04/16/21 Proximal;Right;Ventral (anterior) Forearm less than 1 day          Drain            Negative Pressure Wound Therapy (V A C ) Other (Comment) 10 days              Physical Exam   GEN: NAD, alert and oriented,morbidly obese  SKIN: dry without significant lesions or rashes  HEENT: NCAT, PERRL, EOMs intact  NECK: No JVD or carotid bruits appreciated  CARDIOVASCULAR: RRR, normal S1, S2 without murmurs, rubs, or gallops appreciated  LUNGS: Clear to auscultation bilaterally without wheezes, rhonchi, or rales  ABDOMEN: Soft, nontender, nondistended  EXTREMITIES/VASCULAR: perfused without clubbing, cyanosis, or edema b/l  PSYCH: Normal mood and affect    Lab Results: I have personally reviewed pertinent lab results  Results from last 7 days   Lab Units 04/16/21  0543 04/15/21  0533 04/14/21  0548   WBC Thousand/uL 14 26* 12 89* 13 33*   HEMOGLOBIN g/dL 9 9* 9 9* 9 6*   HEMATOCRIT % 32 0* 31 7* 31 1*   PLATELETS Thousands/uL 533* 514* 482*     Results from last 7 days   Lab Units 04/16/21  0543 04/12/21  0522 04/11/21  0436   POTASSIUM mmol/L 4 0 4 0 4 0   CHLORIDE mmol/L 104 98* 100   CO2 mmol/L 30 31 33*   BUN mg/dL 12 12 11   CREATININE mg/dL 1 03 1 00 0 95   CALCIUM mg/dL 8 7 9 2 9 1             Imaging: I have personally reviewed pertinent reports

## 2021-04-17 ENCOUNTER — ANESTHESIA EVENT (INPATIENT)
Dept: PERIOP | Facility: HOSPITAL | Age: 41
DRG: 570 | End: 2021-04-17
Payer: COMMERCIAL

## 2021-04-17 ENCOUNTER — ANESTHESIA (INPATIENT)
Dept: PERIOP | Facility: HOSPITAL | Age: 41
DRG: 570 | End: 2021-04-17
Payer: COMMERCIAL

## 2021-04-17 LAB
ANION GAP SERPL CALCULATED.3IONS-SCNC: 3 MMOL/L (ref 4–13)
BASOPHILS # BLD AUTO: 0.07 THOUSANDS/ΜL (ref 0–0.1)
BASOPHILS NFR BLD AUTO: 1 % (ref 0–1)
BUN SERPL-MCNC: 13 MG/DL (ref 5–25)
CALCIUM SERPL-MCNC: 8.9 MG/DL (ref 8.3–10.1)
CHLORIDE SERPL-SCNC: 106 MMOL/L (ref 100–108)
CO2 SERPL-SCNC: 28 MMOL/L (ref 21–32)
CREAT SERPL-MCNC: 0.95 MG/DL (ref 0.6–1.3)
EOSINOPHIL # BLD AUTO: 0.35 THOUSAND/ΜL (ref 0–0.61)
EOSINOPHIL NFR BLD AUTO: 3 % (ref 0–6)
ERYTHROCYTE [DISTWIDTH] IN BLOOD BY AUTOMATED COUNT: 13.7 % (ref 11.6–15.1)
GFR SERPL CREATININE-BSD FRML MDRD: 100 ML/MIN/1.73SQ M
GLUCOSE SERPL-MCNC: 127 MG/DL (ref 65–140)
GLUCOSE SERPL-MCNC: 133 MG/DL (ref 65–140)
GLUCOSE SERPL-MCNC: 148 MG/DL (ref 65–140)
GLUCOSE SERPL-MCNC: 151 MG/DL (ref 65–140)
GLUCOSE SERPL-MCNC: 201 MG/DL (ref 65–140)
GLUCOSE SERPL-MCNC: 238 MG/DL (ref 65–140)
HCT VFR BLD AUTO: 33.9 % (ref 36.5–49.3)
HGB BLD-MCNC: 10.3 G/DL (ref 12–17)
IMM GRANULOCYTES # BLD AUTO: 0.15 THOUSAND/UL (ref 0–0.2)
IMM GRANULOCYTES NFR BLD AUTO: 1 % (ref 0–2)
LYMPHOCYTES # BLD AUTO: 2.09 THOUSANDS/ΜL (ref 0.6–4.47)
LYMPHOCYTES NFR BLD AUTO: 17 % (ref 14–44)
MCH RBC QN AUTO: 27.3 PG (ref 26.8–34.3)
MCHC RBC AUTO-ENTMCNC: 30.4 G/DL (ref 31.4–37.4)
MCV RBC AUTO: 90 FL (ref 82–98)
MONOCYTES # BLD AUTO: 0.86 THOUSAND/ΜL (ref 0.17–1.22)
MONOCYTES NFR BLD AUTO: 7 % (ref 4–12)
NEUTROPHILS # BLD AUTO: 9.17 THOUSANDS/ΜL (ref 1.85–7.62)
NEUTS SEG NFR BLD AUTO: 71 % (ref 43–75)
NRBC BLD AUTO-RTO: 0 /100 WBCS
PLATELET # BLD AUTO: 517 THOUSANDS/UL (ref 149–390)
PMV BLD AUTO: 8.8 FL (ref 8.9–12.7)
POTASSIUM SERPL-SCNC: 4.4 MMOL/L (ref 3.5–5.3)
RBC # BLD AUTO: 3.77 MILLION/UL (ref 3.88–5.62)
SODIUM SERPL-SCNC: 137 MMOL/L (ref 136–145)
WBC # BLD AUTO: 12.69 THOUSAND/UL (ref 4.31–10.16)

## 2021-04-17 PROCEDURE — 80048 BASIC METABOLIC PNL TOTAL CA: CPT | Performed by: PHYSICIAN ASSISTANT

## 2021-04-17 PROCEDURE — 0JDM0ZZ EXTRACTION OF LEFT UPPER LEG SUBCUTANEOUS TISSUE AND FASCIA, OPEN APPROACH: ICD-10-PCS | Performed by: SURGERY

## 2021-04-17 PROCEDURE — 85025 COMPLETE CBC W/AUTO DIFF WBC: CPT | Performed by: PHYSICIAN ASSISTANT

## 2021-04-17 PROCEDURE — 99024 POSTOP FOLLOW-UP VISIT: CPT | Performed by: SURGERY

## 2021-04-17 PROCEDURE — 99232 SBSQ HOSP IP/OBS MODERATE 35: CPT | Performed by: INTERNAL MEDICINE

## 2021-04-17 PROCEDURE — 97606 NEG PRS WND THER DME>50 SQCM: CPT | Performed by: SURGERY

## 2021-04-17 PROCEDURE — 82948 REAGENT STRIP/BLOOD GLUCOSE: CPT

## 2021-04-17 RX ORDER — GLYCOPYRROLATE 0.2 MG/ML
INJECTION INTRAMUSCULAR; INTRAVENOUS AS NEEDED
Status: DISCONTINUED | OUTPATIENT
Start: 2021-04-17 | End: 2021-04-17

## 2021-04-17 RX ORDER — SODIUM CHLORIDE, SODIUM LACTATE, POTASSIUM CHLORIDE, CALCIUM CHLORIDE 600; 310; 30; 20 MG/100ML; MG/100ML; MG/100ML; MG/100ML
50 INJECTION, SOLUTION INTRAVENOUS CONTINUOUS
Status: DISCONTINUED | OUTPATIENT
Start: 2021-04-17 | End: 2021-04-19

## 2021-04-17 RX ORDER — MAGNESIUM HYDROXIDE 1200 MG/15ML
LIQUID ORAL AS NEEDED
Status: DISCONTINUED | OUTPATIENT
Start: 2021-04-17 | End: 2021-04-17 | Stop reason: HOSPADM

## 2021-04-17 RX ORDER — MIDAZOLAM HYDROCHLORIDE 2 MG/2ML
INJECTION, SOLUTION INTRAMUSCULAR; INTRAVENOUS AS NEEDED
Status: DISCONTINUED | OUTPATIENT
Start: 2021-04-17 | End: 2021-04-17

## 2021-04-17 RX ORDER — CEFAZOLIN SODIUM 1 G/3ML
INJECTION, POWDER, FOR SOLUTION INTRAMUSCULAR; INTRAVENOUS AS NEEDED
Status: DISCONTINUED | OUTPATIENT
Start: 2021-04-17 | End: 2021-04-17

## 2021-04-17 RX ORDER — ONDANSETRON 2 MG/ML
INJECTION INTRAMUSCULAR; INTRAVENOUS AS NEEDED
Status: DISCONTINUED | OUTPATIENT
Start: 2021-04-17 | End: 2021-04-17

## 2021-04-17 RX ORDER — PROMETHAZINE HYDROCHLORIDE 25 MG/ML
25 INJECTION, SOLUTION INTRAMUSCULAR; INTRAVENOUS ONCE AS NEEDED
Status: DISCONTINUED | OUTPATIENT
Start: 2021-04-17 | End: 2021-04-17 | Stop reason: HOSPADM

## 2021-04-17 RX ORDER — SODIUM CHLORIDE, SODIUM LACTATE, POTASSIUM CHLORIDE, CALCIUM CHLORIDE 600; 310; 30; 20 MG/100ML; MG/100ML; MG/100ML; MG/100ML
INJECTION, SOLUTION INTRAVENOUS CONTINUOUS PRN
Status: DISCONTINUED | OUTPATIENT
Start: 2021-04-17 | End: 2021-04-17

## 2021-04-17 RX ORDER — FENTANYL CITRATE 50 UG/ML
INJECTION, SOLUTION INTRAMUSCULAR; INTRAVENOUS AS NEEDED
Status: DISCONTINUED | OUTPATIENT
Start: 2021-04-17 | End: 2021-04-17

## 2021-04-17 RX ORDER — HYDROMORPHONE HCL/PF 1 MG/ML
0.5 SYRINGE (ML) INJECTION
Status: DISCONTINUED | OUTPATIENT
Start: 2021-04-17 | End: 2021-04-17 | Stop reason: HOSPADM

## 2021-04-17 RX ORDER — LIDOCAINE HYDROCHLORIDE 10 MG/ML
INJECTION, SOLUTION EPIDURAL; INFILTRATION; INTRACAUDAL; PERINEURAL AS NEEDED
Status: DISCONTINUED | OUTPATIENT
Start: 2021-04-17 | End: 2021-04-17

## 2021-04-17 RX ORDER — PROPOFOL 10 MG/ML
INJECTION, EMULSION INTRAVENOUS AS NEEDED
Status: DISCONTINUED | OUTPATIENT
Start: 2021-04-17 | End: 2021-04-17

## 2021-04-17 RX ORDER — PROPOFOL 10 MG/ML
INJECTION, EMULSION INTRAVENOUS CONTINUOUS PRN
Status: DISCONTINUED | OUTPATIENT
Start: 2021-04-17 | End: 2021-04-17

## 2021-04-17 RX ORDER — FENTANYL CITRATE/PF 50 MCG/ML
50 SYRINGE (ML) INJECTION
Status: DISCONTINUED | OUTPATIENT
Start: 2021-04-17 | End: 2021-04-17 | Stop reason: HOSPADM

## 2021-04-17 RX ORDER — KETAMINE HYDROCHLORIDE 50 MG/ML
INJECTION, SOLUTION, CONCENTRATE INTRAMUSCULAR; INTRAVENOUS AS NEEDED
Status: DISCONTINUED | OUTPATIENT
Start: 2021-04-17 | End: 2021-04-17

## 2021-04-17 RX ADMIN — FENTANYL CITRATE 25 MCG: 50 INJECTION INTRAMUSCULAR; INTRAVENOUS at 12:01

## 2021-04-17 RX ADMIN — ASPIRIN 81 MG: 81 TABLET, CHEWABLE ORAL at 09:00

## 2021-04-17 RX ADMIN — HYDROMORPHONE HYDROCHLORIDE 6 MG: 2 TABLET ORAL at 06:15

## 2021-04-17 RX ADMIN — ACETAMINOPHEN 975 MG: 325 TABLET ORAL at 06:14

## 2021-04-17 RX ADMIN — CARVEDILOL 6.25 MG: 6.25 TABLET, FILM COATED ORAL at 09:00

## 2021-04-17 RX ADMIN — SODIUM CHLORIDE, SODIUM LACTATE, POTASSIUM CHLORIDE, AND CALCIUM CHLORIDE: .6; .31; .03; .02 INJECTION, SOLUTION INTRAVENOUS at 11:23

## 2021-04-17 RX ADMIN — POLYETHYLENE GLYCOL 3350 17 G: 17 POWDER, FOR SOLUTION ORAL at 14:23

## 2021-04-17 RX ADMIN — KETAMINE HYDROCHLORIDE 10 MG: 50 INJECTION, SOLUTION INTRAMUSCULAR; INTRAVENOUS at 12:00

## 2021-04-17 RX ADMIN — CHLORHEXIDINE GLUCONATE 0.12% ORAL RINSE 15 ML: 1.2 LIQUID ORAL at 09:07

## 2021-04-17 RX ADMIN — INSULIN LISPRO 4 UNITS: 100 INJECTION, SOLUTION INTRAVENOUS; SUBCUTANEOUS at 22:01

## 2021-04-17 RX ADMIN — KETAMINE HYDROCHLORIDE 10 MG: 50 INJECTION, SOLUTION INTRAMUSCULAR; INTRAVENOUS at 11:37

## 2021-04-17 RX ADMIN — METHOCARBAMOL TABLETS 750 MG: 750 TABLET, COATED ORAL at 23:13

## 2021-04-17 RX ADMIN — ENOXAPARIN SODIUM 60 MG: 60 INJECTION SUBCUTANEOUS at 20:17

## 2021-04-17 RX ADMIN — GABAPENTIN 200 MG: 100 CAPSULE ORAL at 09:00

## 2021-04-17 RX ADMIN — ATORVASTATIN CALCIUM 80 MG: 80 TABLET, FILM COATED ORAL at 16:56

## 2021-04-17 RX ADMIN — MIDAZOLAM 2 MG: 1 INJECTION INTRAMUSCULAR; INTRAVENOUS at 11:25

## 2021-04-17 RX ADMIN — ONDANSETRON 4 MG: 2 INJECTION INTRAMUSCULAR; INTRAVENOUS at 12:21

## 2021-04-17 RX ADMIN — KETAMINE HYDROCHLORIDE 10 MG: 50 INJECTION, SOLUTION INTRAMUSCULAR; INTRAVENOUS at 11:33

## 2021-04-17 RX ADMIN — GABAPENTIN 200 MG: 100 CAPSULE ORAL at 16:56

## 2021-04-17 RX ADMIN — INSULIN LISPRO 8 UNITS: 100 INJECTION, SOLUTION INTRAVENOUS; SUBCUTANEOUS at 17:02

## 2021-04-17 RX ADMIN — HYDROMORPHONE HYDROCHLORIDE 6 MG: 2 TABLET ORAL at 23:17

## 2021-04-17 RX ADMIN — ENOXAPARIN SODIUM 60 MG: 60 INJECTION SUBCUTANEOUS at 09:00

## 2021-04-17 RX ADMIN — HYDROMORPHONE HYDROCHLORIDE 1 MG: 1 INJECTION, SOLUTION INTRAMUSCULAR; INTRAVENOUS; SUBCUTANEOUS at 09:01

## 2021-04-17 RX ADMIN — GLYCOPYRROLATE 0.2 MG: 0.2 INJECTION, SOLUTION INTRAMUSCULAR; INTRAVENOUS at 11:25

## 2021-04-17 RX ADMIN — CHLORHEXIDINE GLUCONATE 0.12% ORAL RINSE 15 ML: 1.2 LIQUID ORAL at 20:17

## 2021-04-17 RX ADMIN — GABAPENTIN 200 MG: 100 CAPSULE ORAL at 20:17

## 2021-04-17 RX ADMIN — CEFAZOLIN 3000 MG: 1 INJECTION, POWDER, FOR SOLUTION INTRAMUSCULAR; INTRAVENOUS at 11:35

## 2021-04-17 RX ADMIN — KETAMINE HYDROCHLORIDE 10 MG: 50 INJECTION, SOLUTION INTRAMUSCULAR; INTRAVENOUS at 11:41

## 2021-04-17 RX ADMIN — FENTANYL CITRATE 25 MCG: 50 INJECTION INTRAMUSCULAR; INTRAVENOUS at 11:52

## 2021-04-17 RX ADMIN — HYDROMORPHONE HYDROCHLORIDE 6 MG: 2 TABLET ORAL at 01:51

## 2021-04-17 RX ADMIN — KETAMINE HYDROCHLORIDE 10 MG: 50 INJECTION, SOLUTION INTRAMUSCULAR; INTRAVENOUS at 12:07

## 2021-04-17 RX ADMIN — ACETAMINOPHEN 975 MG: 325 TABLET ORAL at 20:17

## 2021-04-17 RX ADMIN — FENTANYL CITRATE 50 MCG: 50 INJECTION INTRAMUSCULAR; INTRAVENOUS at 11:33

## 2021-04-17 RX ADMIN — TICAGRELOR 90 MG: 90 TABLET ORAL at 20:15

## 2021-04-17 RX ADMIN — HYDROMORPHONE HYDROCHLORIDE 1 MG: 1 INJECTION, SOLUTION INTRAMUSCULAR; INTRAVENOUS; SUBCUTANEOUS at 21:58

## 2021-04-17 RX ADMIN — METHOCARBAMOL TABLETS 750 MG: 750 TABLET, COATED ORAL at 17:00

## 2021-04-17 RX ADMIN — METHOCARBAMOL TABLETS 750 MG: 750 TABLET, COATED ORAL at 06:15

## 2021-04-17 RX ADMIN — LIDOCAINE HYDROCHLORIDE 100 MG: 10 INJECTION, SOLUTION EPIDURAL; INFILTRATION; INTRACAUDAL; PERINEURAL at 11:33

## 2021-04-17 RX ADMIN — HYDROMORPHONE HYDROCHLORIDE 6 MG: 2 TABLET ORAL at 19:14

## 2021-04-17 RX ADMIN — KETAMINE HYDROCHLORIDE 10 MG: 50 INJECTION, SOLUTION INTRAMUSCULAR; INTRAVENOUS at 11:45

## 2021-04-17 RX ADMIN — CARVEDILOL 6.25 MG: 6.25 TABLET, FILM COATED ORAL at 20:15

## 2021-04-17 RX ADMIN — Medication 50 MCG: at 12:42

## 2021-04-17 RX ADMIN — HYDROMORPHONE HYDROCHLORIDE 1 MG: 1 INJECTION, SOLUTION INTRAMUSCULAR; INTRAVENOUS; SUBCUTANEOUS at 17:00

## 2021-04-17 RX ADMIN — KETAMINE HYDROCHLORIDE 10 MG: 50 INJECTION, SOLUTION INTRAMUSCULAR; INTRAVENOUS at 12:13

## 2021-04-17 RX ADMIN — PROPOFOL 50 MG: 10 INJECTION, EMULSION INTRAVENOUS at 11:33

## 2021-04-17 RX ADMIN — PROPOFOL 100 MCG/KG/MIN: 10 INJECTION, EMULSION INTRAVENOUS at 11:33

## 2021-04-17 RX ADMIN — ACETAMINOPHEN 975 MG: 325 TABLET ORAL at 13:42

## 2021-04-17 RX ADMIN — DOCUSATE SODIUM AND SENNOSIDES 1 TABLET: 8.6; 5 TABLET ORAL at 20:16

## 2021-04-17 RX ADMIN — TICAGRELOR 90 MG: 90 TABLET ORAL at 09:00

## 2021-04-17 RX ADMIN — HYDROMORPHONE HYDROCHLORIDE 6 MG: 2 TABLET ORAL at 13:43

## 2021-04-17 NOTE — OP NOTE
OPERATIVE REPORT  PATIENT NAME: Javon Goldberg    :  1980  MRN: [id number]  Pt Location: BE OR ROOM 06    SURGERY DATE: 2021    Surgeon(s) and Role:     * Lamine Bustillo DO - Primary     * Anthony Dickerson MD - Assisting    Preop Diagnosis:  Skin lesion of left leg [L98 9]  Cutaneous abscess of groin [L02 214]    Post-Op Diagnosis Codes:     * Skin lesion of left leg [L98 9]     * Cutaneous abscess of groin [L02 214]    Procedure(s) (LRB):  DEBRIDEMENT WOUND AND DRESSING CHANGE ([address] OUT) (Left)    Specimen(s):  * No specimens in log *    Estimated Blood Loss:   50 mL    Drains:  Negative Pressure Wound Therapy (V A C ) Other (Comment) (Active)   Wound Length (cm) 35 cm 21 1016   Wound Width (cm) 13 cm 21 1016   Wound Depth (cm) 4 cm 21 1016   Wound Surface Area (cm^2) 455 cm^2 21 1016   Wound Volume (cm^3) 1820 cm^3 21 1016   Calculated Wound Volume (cm^3) 1820 cm^3 21 1016   Unit Type wound vac  21 1345   Black foam- # applied 2 21 1207   White foam- # applied 2 21 2182   Nonadherent (Adaptic)- # applied 2 21 1207   Cycle Continuous; On 21 1345   Target Pressure (mmHg) 125 21 1345   Canister Changed Yes 21 1207   Dressing Status Clean;Dry; Intact 21 1345   Black foam- # removed 2 21 1148   Nonadherent (Adaptic)- # removed 1 21 1148   Output (mL) 0 mL 21 1345   Number of days: 11       [REMOVED] NG/OG/Enteral Tube Orogastric 18 Fr (Removed)   Placement Reverification Auscultation 21 0800   Site Assessment Clean;Dry; Intact 21 0800   Status Suction-low intermittent 21 1000   Drainage Appearance Bile 21 0800   Intake (mL) 0 mL 21 1000   Output (mL) 0 mL 21 1000   Number of days: 1       [REMOVED] Urethral Catheter Latex 16 Fr   (Removed)   Reasons to continue Urinary Catheter  Accurate I&O assessment in critically ill patients (48 hr  max) 21 0800   Goal for Removal Remove after 48 hrs of I/O monitoring 04/08/21 0800   Site Assessment Clean;Skin intact; Patent 04/08/21 0800   Collection Container Standard drainage bag 04/08/21 0800   Securement Method Securing device (Describe) 04/08/21 0800   Output (mL) 325 mL 04/08/21 1028   Number of days: 2       Anesthesia Type:   General    Operative Indications:  Skin lesion of left leg [L98 9]  Cutaneous abscess of groin [L02 214]    Operative Findings:  Medial aspect of wound with purulent field pocket/undermining  This pocket measures 15 x 2 x 4 cm in size  Complications:   None    Procedure and Technique:  Patient was seen and examined in the preoperative  All questions and concerns were answered  Patient was then transported to OR and placed in supine positions with the left leg on sterile  His prior dressings were taken down  Anesthesia with sedation was induced, patient was then prepped and draped in the usual sterile fashion with Betadine  An appropriate time-out was called addressing all concerns  The wound was inspected  There were some purulent drainage around the medial aspect of the wound for which there was a purulent pocket  All loculations/pocket of pus was broken down  The final undermining/pocket measures 15 x 2 x 4 cm in size  The wound itself measures 32 x 15 x 3 cm in size  The wound was then thoroughly irrigated with Pulsavac for total of 2 L  Hemostasis was then achieved with electrocautery  Two white sponge was placed in the pocket/undermining in the middle thigh  To Adaptic was placed onto the wound bed and subsequently an extra-large black sponge was placed into the wound bed  This was covered with a sticky drapes and a hole was made  The breech was made to the lateral thigh and another black sponge was placed making sure that no diarrhea skin contact with a sponge was present  This was then connected to the [facility] machine and -125 mm Hg suction was placed  No leak was seen      Patient was then awakened  He was transported to the PACU in good and stable conditions  All counts were correct at the end of the case  RF wanding was negative  Dr Crystal Dutton was present for the case             Patient Disposition:  PACU  and hemodynamically stable    SIGNATURE: Malaika Dickerson MD  DATE: April 17, 2021  TIME: 2:49 PM

## 2021-04-17 NOTE — PLAN OF CARE
Problem: Prexisting or High Potential for Compromised Skin Integrity  Goal: Skin integrity is maintained or improved  Description: INTERVENTIONS:  - Identify patients at risk for skin breakdown  - Assess and monitor skin integrity  - Assess and monitor nutrition and hydration status  - Monitor labs   - Assess for incontinence   - Turn and reposition patient  - Assist with mobility/ambulation  - Relieve pressure over bony prominences  - Avoid friction and shearing  - Provide appropriate hygiene as needed including keeping skin clean and dry  - Evaluate need for skin moisturizer/barrier cream  - Collaborate with interdisciplinary team   - Patient/family teaching  - Consider wound care consult   Outcome: Progressing     Problem: SAFETY,RESTRAINT: NV/NON-SELF DESTRUCTIVE BEHAVIOR  Goal: Remains free of harm/injury (restraint for non violent/non self-detsructive behavior)  Description: INTERVENTIONS:  - Instruct patient/family regarding restraint use   - Assess and monitor physiologic and psychological status   - Provide interventions and comfort measures to meet assessed patient needs   - Identify and implement measures to help patient regain control  - Assess readiness for release of restraint   Outcome: Progressing  Goal: Returns to optimal restraint-free functioning  Description: INTERVENTIONS:  - Assess the patient's behavior and symptoms that indicate continued need for restraint  - Identify and implement measures to help patient regain control  - Assess readiness for release of restraint   Outcome: Progressing     Problem: PAIN - ADULT  Goal: Verbalizes/displays adequate comfort level or baseline comfort level  Description: Interventions:  - Encourage patient to monitor pain and request assistance  - Assess pain using appropriate pain scale  - Administer analgesics based on type and severity of pain and evaluate response  - Implement non-pharmacological measures as appropriate and evaluate response  - Consider cultural and social influences on pain and pain management  - Notify physician/advanced practitioner if interventions unsuccessful or patient reports new pain  Outcome: Progressing     Problem: INFECTION - ADULT  Goal: Absence or prevention of progression during hospitalization  Description: INTERVENTIONS:  - Assess and monitor for signs and symptoms of infection  - Monitor lab/diagnostic results  - Monitor all insertion sites, i e  indwelling lines, tubes, and drains  - Monitor endotracheal if appropriate and nasal secretions for changes in amount and color  - [place] appropriate cooling/warming therapies per order  - Administer medications as ordered  - Instruct and encourage patient and family to use good hand hygiene technique  - Identify and instruct in appropriate isolation precautions for identified infection/condition  Outcome: Progressing     Problem: SAFETY ADULT  Goal: Patient will remain free of falls  Description: INTERVENTIONS:  - Assess patient frequently for physical needs  -  Identify cognitive and physical deficits and behaviors that affect risk of falls    -  [place] fall precautions as indicated by assessment   - Educate patient/family on patient safety including physical limitations  - Instruct patient to call for assistance with activity based on assessment  - Modify environment to reduce risk of injury  - Consider OT/PT consult to assist with strengthening/mobility  Outcome: Progressing  Goal: Maintain or return to baseline ADL function  Description: INTERVENTIONS:  -  Assess patient's ability to carry out ADLs; assess patient's baseline for ADL function and identify physical deficits which impact ability to perform ADLs (bathing, care of mouth/teeth, toileting, grooming, dressing, etc )  - Assess/evaluate cause of self-care deficits   - Assess range of motion  - Assess patient's mobility; develop plan if impaired  - Assess patient's need for assistive devices and provide as appropriate  - Encourage maximum independence but intervene and supervise when necessary  - Involve family in performance of ADLs  - Assess for home care needs following discharge   - Consider OT consult to assist with ADL evaluation and planning for discharge  - Provide patient education as appropriate  Outcome: Progressing  Goal: Maintain or return mobility status to optimal level  Description: INTERVENTIONS:  - Assess patient's baseline mobility status (ambulation, transfers, stairs, etc )    - Identify cognitive and physical deficits and behaviors that affect mobility  - Identify mobility aids required to assist with transfers and/or ambulation (gait belt, sit-to-stand, lift, walker, cane, etc )  - [place] fall precautions as indicated by assessment  - Record patient progress and toleration of activity level on Mobility SBAR; progress patient to next Phase/Stage  - Instruct patient to call for assistance with activity based on assessment  - Consider rehabilitation consult to assist with strengthening/weightbearing, etc   Outcome: Progressing     Problem: DISCHARGE PLANNING  Goal: Discharge to home or other facility with appropriate resources  Description: INTERVENTIONS:  - Identify barriers to discharge w/patient and caregiver  - Arrange for needed discharge resources and transportation as appropriate  - Identify discharge learning needs (meds, wound care, etc )  - Arrange for interpretive services to assist at discharge as needed  - Refer to Case Management Department for coordinating discharge planning if the patient needs post-hospital services based on physician/advanced practitioner order or complex needs related to functional status, cognitive ability, or social support system  Outcome: Progressing     Problem: Knowledge Deficit  Goal: Patient/family/caregiver demonstrates understanding of disease process, treatment plan, medications, and discharge instructions  Description: Complete learning assessment and assess knowledge base  Interventions:  - Provide teaching at level of understanding  - Provide teaching via preferred learning methods  Outcome: Progressing     Problem: CARDIOVASCULAR - ADULT  Goal: Maintains optimal cardiac output and hemodynamic stability  Description: INTERVENTIONS:  - Monitor I/O, vital signs and rhythm  - Monitor for S/S and trends of decreased cardiac output  - Administer and titrate ordered vasoactive medications to optimize hemodynamic stability  - Assess quality of pulses, skin color and temperature  - Assess for signs of decreased coronary artery perfusion  - Instruct patient to report change in severity of symptoms  Outcome: Progressing  Goal: Absence of cardiac dysrhythmias or at baseline rhythm  Description: INTERVENTIONS:  - Continuous cardiac monitoring, vital signs, obtain 12 lead EKG if ordered  - Administer antiarrhythmic and heart rate control medications as ordered  - Monitor electrolytes and administer replacement therapy as ordered  Outcome: Progressing     Problem: RESPIRATORY - ADULT  Goal: Achieves optimal ventilation and oxygenation  Description: INTERVENTIONS:  - Assess for changes in respiratory status  - Assess for changes in mentation and behavior  - Position to facilitate oxygenation and minimize respiratory effort  - Oxygen administered by appropriate delivery if ordered  - Initiate smoking cessation education as indicated  - Encourage broncho-pulmonary hygiene including cough, deep breathe, Incentive Spirometry  - Assess the need for suctioning and aspirate as needed  - Assess and instruct to report SOB or any respiratory difficulty  - Respiratory Therapy support as indicated  Outcome: Progressing     Problem: Potential for Falls  Goal: Patient will remain free of falls  Description: INTERVENTIONS:  - Assess patient frequently for physical needs  -  Identify cognitive and physical deficits and behaviors that affect risk of falls    -  [place] fall precautions as indicated by assessment   - Educate patient/family on patient safety including physical limitations  - Instruct patient to call for assistance with activity based on assessment  - Modify environment to reduce risk of injury  - Consider OT/PT consult to assist with strengthening/mobility  Outcome: Progressing     Problem: Nutrition/Hydration-ADULT  Goal: Nutrient/Hydration intake appropriate for improving, restoring or maintaining nutritional needs  Description: Monitor and assess patient's nutrition/hydration status for malnutrition  Collaborate with interdisciplinary team and initiate plan and interventions as ordered  Monitor patient's weight and dietary intake as ordered or per policy  Utilize nutrition screening tool and intervene as necessary  Determine patient's food preferences and provide high-protein, high-caloric foods as appropriate       INTERVENTIONS:  - Monitor oral intake, urinary output, labs, and treatment plans  - Assess nutrition and hydration status and recommend course of action  - Evaluate amount of meals eaten  - Assist patient with eating if necessary   - Allow adequate time for meals  - Recommend/ encourage appropriate diets, oral nutritional supplements, and vitamin/mineral supplements  - Order, calculate, and assess calorie counts as needed  - Recommend, monitor, and adjust tube feedings and TPN/PPN based on assessed needs  - Assess need for intravenous fluids  - Provide specific nutrition/hydration education as appropriate  - Include patient/family/caregiver in decisions related to nutrition  Outcome: Progressing

## 2021-04-17 NOTE — PROGRESS NOTES
General Cardiology Progress Note - Marck Galdamez 45 y o  male MRN: [id number]    Unit/Bed#: [facility] 12-46 Encounter: [id number]      Assessment:    Principal Problem:    Left groin mass  Active Problems:    CAD (coronary artery disease)    Class 3 severe obesity due to excess calories with serious comorbidity and body mass index (BMI) of 50 0 to 59 9 in [facility])    CAD  ( SABINE TO CLX in 2010 and more recently to mid RCA in December)  Morbid obesity  LLE soft tissue lesion   Hidradenitis  Hypertension  Hyperlipidemia  DM  Hypoxic respiratory failure, resolved    Plan:  Continue medical therapy for CAD  Euvolemic on exam  No chest pain or shortness of breath  Normal biventricular systolic function on echocardiogram  For skin grafting on Monday    Subjective:   Patient seen and examined  No significant events overnight  Review of Systems   Respiratory: Negative for chest tightness and shortness of breath  Cardiovascular: Negative for chest pain, palpitations and leg swelling  Neurological: Negative for dizziness and light-headedness  Objective: Intake/ Output: 1780/1890, -110  Weight: 354 lbs  Tele: off      Vitals: Blood pressure 115/61, pulse 57, temperature 98 6 °F (37 °C), resp  rate 19, height 5' 6 75" (1 695 m), weight (!) 161 kg (354 lb 4 5 oz), SpO2 92 %  , Body mass index is 55 9 kg/m² , I/O last 3 completed shifts: In: 2500 [P O :2420; I V :30; IV Piggyback:50]  Out: 0266 [Urine:3350; Drains:640]  I/O this shift: In: 700 [I V :700]  Out: 50 [Blood:50]  Wt Readings from Last 3 Encounters:   04/16/21 (!) 161 kg (354 lb 4 5 oz)   04/01/21 (!) 168 kg (370 lb 12 8 oz)   03/31/21 (!) 167 kg (368 lb 9 6 oz)       Intake/Output Summary (Last 24 hours) at 4/17/2021 1450  Last data filed at 4/17/2021 1345  Gross per 24 hour   Intake 1780 ml   Output 1350 ml   Net 430 ml     I/O last 3 completed shifts:   In: 2500 [P O :2420; I V :30; IV Piggyback:50]  Out: 1321 [Urine:3350; Drains:640]    No significant arrhythmias seen on telemetry review         Physical Exam:  Vitals:    04/17/21 1231 04/17/21 1245 04/17/21 1300 04/17/21 1320   BP: 126/65 139/62 116/58 115/61   Pulse: 63 62 (!) 50 57   Resp: 20 20 19    Temp: 98 6 °F (37 °C)      TempSrc:       SpO2: 100% 94% 96% 92%   Weight:       Height:           GEN: Caroline Mode appears well, alert and oriented x 3, pleasant and cooperative   HEENT: NC/AT, moist mucosa, anicteric sclerae; extraocular muscles intact  NECK: supple, no carotid bruits   HEART: regular rhythm, normal S1 and S2, no murmurs, clicks, gallops or rubs, JVP is  nonelevated  LUNGS: clear to auscultation bilaterally; no wheezes, rales, or rhonchi   ABDOMEN: normal bowel sounds, soft, no tenderness, no distention  EXTREMITIES: peripheral pulses normal; no clubbing, cyanosis, or edema  NEURO: no focal findings   SKIN: normal without suspicious lesions on exposed skin, wound vac on left thigh in place      Current Facility-Administered Medications:     acetaminophen (TYLENOL) tablet 975 mg, 975 mg, Oral, Q8H [facility], Ashok Dickerson MD, 975 mg at 04/17/21 1342    aspirin chewable tablet 81 mg, 81 mg, Oral, Daily, Ashok Dickerson MD, 81 mg at 04/17/21 0900    atorvastatin (LIPITOR) tablet 80 mg, 80 mg, Oral, Daily With Dinner, Ashok Dickerson MD, 80 mg at 04/16/21 1656    carvedilol (COREG) tablet 6 25 mg, 6 25 mg, Oral, BID With Meals, Ashok Dickerson MD, 6 25 mg at 04/17/21 0900    ceFAZolin (ANCEF) 3,000 mg in dextrose 5% 100 ml IVPB, 3,000 mg, Intravenous, Once, Ashok Dickerson MD    chlorhexidine (PERIDEX) 0 12 % oral rinse 15 mL, 15 mL, Swish & Spit, Q12H [facility], Ashok Dickerson MD, 15 mL at 04/17/21 0907    enoxaparin (LOVENOX) subcutaneous injection 60 mg, 60 mg, Subcutaneous, Q12H [facility], Ashok Dickerson MD, 60 mg at 04/17/21 0900    gabapentin (NEURONTIN) capsule 200 mg, 200 mg, Oral, TID, Ashok Dickerson MD, 200 mg at 04/17/21 0900    HYDROmorphone (DILAUDID) injection 1 mg, 1 mg, Intravenous, Q4H PRN, Caesar Dickerson MD, 1 mg at 04/17/21 0901    HYDROmorphone (DILAUDID) tablet 4 mg, 4 mg, Oral, Q4H PRN, 4 mg at 04/16/21 1435 **OR** HYDROmorphone (DILAUDID) tablet 6 mg, 6 mg, Oral, Q4H PRN, Caesar Dickerson MD, 6 mg at 04/17/21 1343    insulin lispro (HumaLOG) 100 units/mL subcutaneous injection 4-20 Units, 4-20 Units, Subcutaneous, 4x Daily (AC & HS), 8 Units at 04/16/21 2213 **AND** Fingerstick Glucose (POCT), , , 4x Daily AC and at bedtime, Caesar Dickerson MD    lactated ringers infusion, 50 mL/hr, Intravenous, Continuous, Roosevelt Chen CRNA, Stopped at 04/17/21 1347    methocarbamol (ROBAXIN) tablet 750 mg, 750 mg, Oral, Q6H [address], Caesar Dickerson MD, 750 mg at 04/17/21 0615    naloxone [facility]) injection 0 1 mg, 0 1 mg, Intravenous, Q3 min PRN, Caesar Dickerson MD    polyethylene glycol (MIRALAX) packet 17 g, 17 g, Oral, Daily PRN, Caesar Dickerson MD, 17 g at 04/17/21 1423    senna-docusate sodium (SENOKOT S) 8 6-50 mg per tablet 1 tablet, 1 tablet, Oral, HS, Caesar Dickerson MD, 1 tablet at 04/16/21 2211    sodium chloride (OCEAN) 0 65 % nasal spray 1 spray, 1 spray, Each Nare, Q1H PRN, Caesar Dickerson MD    ticagrelor (BRILINTA) tablet 90 mg, 90 mg, Oral, Q12H [address], Caesar Dickerson MD, 90 mg at 04/17/21 0900      Labs & Results:        Results from last 7 days   Lab Units 04/17/21  0638 04/16/21  0543 04/15/21  0533   WBC Thousand/uL 12 69* 14 26* 12 89*   HEMOGLOBIN g/dL 10 3* 9 9* 9 9*   HEMATOCRIT % 33 9* 32 0* 31 7*   PLATELETS Thousands/uL 517* 533* 514*         Results from last 7 days   Lab Units 04/17/21  0638 04/16/21  0543 04/12/21  0522   POTASSIUM mmol/L 4 4 4 0 4 0   CHLORIDE mmol/L 106 104 98*   CO2 mmol/L 28 30 31   BUN mg/dL 13 12 12   CREATININE mg/dL 0 95 1 03 1 00   CALCIUM mg/dL 8 9 8 7 9 2           JOSE BAUTISTA MD  [facility] 61

## 2021-04-17 NOTE — ANESTHESIA POSTPROCEDURE EVALUATION
Post-Op Assessment Note    CV Status:  Stable  Pain Score: 0    Pain management: adequate     Mental Status:  Alert and awake   Hydration Status:  Euvolemic   PONV Controlled:  Controlled   Airway Patency:  Patent      Post Op Vitals Reviewed: Yes      Staff: Anesthesiologist, CRNA         No complications documented      BP  126/65   Temp   98 6   Pulse  66   Resp   21   SpO2   100

## 2021-04-17 NOTE — PROGRESS NOTES
Progress Note - General Surgery   Marlyn Thao 45 y o  male MRN: [id number]  Unit/Bed#: [facility] 512-01 Encounter: [id number]    Assessment:  45 y o  M with L thigh soft tissue mass s/p excision and vac placement (4/6/21)    Plan:   To OR today for washout and debridement  o Serial consent obtained   Pain control  o Appreciate APS recs   PT/OT  I/Os  DVT ppx  OOB as tolerated   Incentive Spirometry    Subjective/Objective     Subjective:   No acute events overnight  No questions on procedure  Pertinent review of systems as above  All other review of systems negative  Objective:    Blood pressure 132/61, pulse 69, temperature 98 9 °F (37 2 °C), temperature source Oral, resp  rate 18, height 5' 6 75" (1 695 m), weight (!) 161 kg (354 lb 4 5 oz), SpO2 92 %  ,Body mass index is 55 9 kg/m²  Intake/Output Summary (Last 24 hours) at 4/16/2021 2321  Last data filed at 4/16/2021 2201  Gross per 24 hour   Intake 1780 ml   Output 2090 ml   Net -310 ml       Invasive Devices     Peripheral Intravenous Line            Peripheral IV 04/16/21 Proximal;Right;Ventral (anterior) Forearm less than 1 day          Drain            Negative Pressure Wound Therapy (V A C ) Other (Comment) 10 days                Physical Exam:   Gen:  NAD  HEENT: NCAT  MMM  CV: well perfused  Lungs: Normal respiratory effort  Abd: soft, nt/nd,  Skin: warm/ dry  Extremities: no peripheral edema, no clubbing or cyanosis, L thigh dressing cdi  Neuro:  AxO x3      Results from last 7 days   Lab Units 04/16/21  0543 04/15/21  0533 04/14/21  0548   WBC Thousand/uL 14 26* 12 89* 13 33*   HEMOGLOBIN g/dL 9 9* 9 9* 9 6*   HEMATOCRIT % 32 0* 31 7* 31 1*   PLATELETS Thousands/uL 533* 514* 482*     Results from last 7 days   Lab Units 04/16/21  0543 04/12/21  0522 04/11/21  0436   POTASSIUM mmol/L 4 0 4 0 4 0   CHLORIDE mmol/L 104 98* 100   CO2 mmol/L 30 31 33*   BUN mg/dL 12 12 11   CREATININE mg/dL 1 03 1 00 0 95   CALCIUM mg/dL 8 7 9 2 9 1 I have personally reviewed pertinent films in PACS      Medications:   Scheduled Meds:  Current Facility-Administered Medications   Medication Dose Route Frequency Provider Last Rate    acetaminophen  975 mg Oral Q8H [facility] PETRA Ma      aspirin  81 mg Oral Daily Zachary Hinojosa PA-C      atorvastatin  80 mg Oral Daily With [employer]      carvedilol  6 25 mg Oral BID With Meals Addi Alicia, DO      chlorhexidine  15 mL Swish & Spit Q12H [facility] PETRA [place]      enoxaparin  60 mg Subcutaneous Q12H [facility] Rebeka Aguirre, [place]      gabapentin  200 mg Oral TID Johnston Check, PA-JESSIKA      HYDROmorphone  1 mg Intravenous Q4H PRN Johnston Check, [place]      HYDROmorphone  4 mg Oral Q4H PRN David Olp, [place]      Or    HYDROmorphone  6 mg Oral Q4H PRN Froid Olp, [place]      insulin lispro  4-20 Units Subcutaneous 4x Daily (AC & HS) Nacho Caraballo MD      methocarbamol  750 mg Oral Q6H [facility] PETRA [place]      naloxone  0 1 mg Intravenous Q3 min PRN PETRA [place]      polyethylene glycol  17 g Oral Daily PRN Malaika Dickerson MD      senna-docusate sodium  1 tablet Oral HS Addi Alicia, DO      sodium chloride  1 spray Each Nare Q1H PRN Jaleel Sapp MD      ticagrelor  90 mg Oral Q12H [facility] Zachary Hinojosa PA-C       Continuous Infusions:   PRN Meds:  HYDROmorphone, 1 mg, Q4H PRN  HYDROmorphone, 4 mg, Q4H PRN    Or  HYDROmorphone, 6 mg, Q4H PRN  naloxone, 0 1 mg, Q3 min PRN  polyethylene glycol, 17 g, Daily PRN  sodium chloride, 1 spray, Q1H PRN      VTE Pharmacologic Prophylaxis: Enoxaparin (Lovenox)  VTE Mechanical Prophylaxis: sequential compression device

## 2021-04-17 NOTE — ANESTHESIA PREPROCEDURE EVALUATION
Procedure:  DEBRIDEMENT WOUND AND DRESSING CHANGE [facility] OUT) (Left Groin)    Relevant Problems   CARDIO   (+) CAD (coronary artery disease)   (+) HTN (hypertension)   (+) Hypertriglyceridemia   (+) Type 1 non-ST elevation myocardial infarction (NSTEMI) (HCC)      ENDO   (+) DM type 2, uncontrolled, with neuropathy (HCC)      Other   (+) Class 3 severe obesity due to excess calories with serious comorbidity and body mass index (BMI) of 50 0 to 59 9 in adult [facility])      Cardiac stents 2011, most recent Dec 2020    ECHO 4/6/2021:  LEFT VENTRICLE:  Systolic function was normal  Ejection fraction was estimated to be 65 %  There were no regional wall motion abnormalities      RIGHT VENTRICLE:  The size was normal   Systolic function was normal     Took beta blocker this AM    FSBG 151  Physical Exam    Airway  Comment: Short thick neck    TM Distance: >3 FB  Neck ROM: full     Dental   No notable dental hx     Cardiovascular  Cardiovascular exam normal    Pulmonary  Pulmonary exam normal     Other Findings        Anesthesia Plan  ASA Score- 3     Anesthesia Type- IV sedation with anesthesia with ASA Monitors  Additional Monitors:   Airway Plan:     Comment: GA with ETT back up  Pt did not tolerate GA with LMA in the past - possible bronchospasm  Plan Factors-    Chart reviewed  Patient summary reviewed  Induction- intravenous  Postoperative Plan-     Informed Consent- Anesthetic plan and risks discussed with patient  I personally reviewed this patient with the CRNA  Discussed and agreed on the Anesthesia Plan with the CRNA  Jh Ngo

## 2021-04-18 LAB
ANION GAP SERPL CALCULATED.3IONS-SCNC: 5 MMOL/L (ref 4–13)
BASOPHILS # BLD AUTO: 0.08 THOUSANDS/ΜL (ref 0–0.1)
BASOPHILS NFR BLD AUTO: 1 % (ref 0–1)
BUN SERPL-MCNC: 13 MG/DL (ref 5–25)
CALCIUM SERPL-MCNC: 8.8 MG/DL (ref 8.3–10.1)
CHLORIDE SERPL-SCNC: 104 MMOL/L (ref 100–108)
CO2 SERPL-SCNC: 28 MMOL/L (ref 21–32)
CREAT SERPL-MCNC: 0.96 MG/DL (ref 0.6–1.3)
EOSINOPHIL # BLD AUTO: 0.45 THOUSAND/ΜL (ref 0–0.61)
EOSINOPHIL NFR BLD AUTO: 4 % (ref 0–6)
ERYTHROCYTE [DISTWIDTH] IN BLOOD BY AUTOMATED COUNT: 13.5 % (ref 11.6–15.1)
GFR SERPL CREATININE-BSD FRML MDRD: 99 ML/MIN/1.73SQ M
GLUCOSE SERPL-MCNC: 135 MG/DL (ref 65–140)
GLUCOSE SERPL-MCNC: 138 MG/DL (ref 65–140)
GLUCOSE SERPL-MCNC: 160 MG/DL (ref 65–140)
GLUCOSE SERPL-MCNC: 164 MG/DL (ref 65–140)
GLUCOSE SERPL-MCNC: 207 MG/DL (ref 65–140)
HCT VFR BLD AUTO: 30.5 % (ref 36.5–49.3)
HGB BLD-MCNC: 9.2 G/DL (ref 12–17)
IMM GRANULOCYTES # BLD AUTO: 0.1 THOUSAND/UL (ref 0–0.2)
IMM GRANULOCYTES NFR BLD AUTO: 1 % (ref 0–2)
LYMPHOCYTES # BLD AUTO: 2.05 THOUSANDS/ΜL (ref 0.6–4.47)
LYMPHOCYTES NFR BLD AUTO: 16 % (ref 14–44)
MCH RBC QN AUTO: 27.4 PG (ref 26.8–34.3)
MCHC RBC AUTO-ENTMCNC: 30.2 G/DL (ref 31.4–37.4)
MCV RBC AUTO: 91 FL (ref 82–98)
MONOCYTES # BLD AUTO: 0.9 THOUSAND/ΜL (ref 0.17–1.22)
MONOCYTES NFR BLD AUTO: 7 % (ref 4–12)
NEUTROPHILS # BLD AUTO: 9.26 THOUSANDS/ΜL (ref 1.85–7.62)
NEUTS SEG NFR BLD AUTO: 71 % (ref 43–75)
NRBC BLD AUTO-RTO: 0 /100 WBCS
PLATELET # BLD AUTO: 503 THOUSANDS/UL (ref 149–390)
PMV BLD AUTO: 9.1 FL (ref 8.9–12.7)
POTASSIUM SERPL-SCNC: 4.4 MMOL/L (ref 3.5–5.3)
RBC # BLD AUTO: 3.36 MILLION/UL (ref 3.88–5.62)
SODIUM SERPL-SCNC: 137 MMOL/L (ref 136–145)
WBC # BLD AUTO: 12.84 THOUSAND/UL (ref 4.31–10.16)

## 2021-04-18 PROCEDURE — 85025 COMPLETE CBC W/AUTO DIFF WBC: CPT | Performed by: SURGERY

## 2021-04-18 PROCEDURE — 80048 BASIC METABOLIC PNL TOTAL CA: CPT | Performed by: SURGERY

## 2021-04-18 PROCEDURE — 99232 SBSQ HOSP IP/OBS MODERATE 35: CPT | Performed by: INTERNAL MEDICINE

## 2021-04-18 PROCEDURE — 82948 REAGENT STRIP/BLOOD GLUCOSE: CPT

## 2021-04-18 PROCEDURE — 99024 POSTOP FOLLOW-UP VISIT: CPT | Performed by: SURGERY

## 2021-04-18 RX ORDER — POLYETHYLENE GLYCOL 3350 17 G/17G
17 POWDER, FOR SOLUTION ORAL DAILY
Status: DISCONTINUED | OUTPATIENT
Start: 2021-04-19 | End: 2021-04-29 | Stop reason: HOSPADM

## 2021-04-18 RX ORDER — MINERAL OIL 100 G/100G
1 OIL RECTAL ONCE
Status: DISCONTINUED | OUTPATIENT
Start: 2021-04-18 | End: 2021-04-18

## 2021-04-18 RX ADMIN — INSULIN LISPRO 4 UNITS: 100 INJECTION, SOLUTION INTRAVENOUS; SUBCUTANEOUS at 17:03

## 2021-04-18 RX ADMIN — ASPIRIN 81 MG: 81 TABLET, CHEWABLE ORAL at 09:31

## 2021-04-18 RX ADMIN — GABAPENTIN 200 MG: 100 CAPSULE ORAL at 09:31

## 2021-04-18 RX ADMIN — HYDROMORPHONE HYDROCHLORIDE 6 MG: 2 TABLET ORAL at 21:16

## 2021-04-18 RX ADMIN — GABAPENTIN 200 MG: 100 CAPSULE ORAL at 17:00

## 2021-04-18 RX ADMIN — INSULIN LISPRO 4 UNITS: 100 INJECTION, SOLUTION INTRAVENOUS; SUBCUTANEOUS at 11:44

## 2021-04-18 RX ADMIN — ENOXAPARIN SODIUM 60 MG: 60 INJECTION SUBCUTANEOUS at 09:31

## 2021-04-18 RX ADMIN — CHLORHEXIDINE GLUCONATE 0.12% ORAL RINSE 15 ML: 1.2 LIQUID ORAL at 21:16

## 2021-04-18 RX ADMIN — TICAGRELOR 90 MG: 90 TABLET ORAL at 09:31

## 2021-04-18 RX ADMIN — HYDROMORPHONE HYDROCHLORIDE 6 MG: 2 TABLET ORAL at 17:08

## 2021-04-18 RX ADMIN — ATORVASTATIN CALCIUM 80 MG: 80 TABLET, FILM COATED ORAL at 17:00

## 2021-04-18 RX ADMIN — TICAGRELOR 90 MG: 90 TABLET ORAL at 21:15

## 2021-04-18 RX ADMIN — METHOCARBAMOL TABLETS 750 MG: 750 TABLET, COATED ORAL at 17:00

## 2021-04-18 RX ADMIN — HYDROMORPHONE HYDROCHLORIDE 6 MG: 2 TABLET ORAL at 13:02

## 2021-04-18 RX ADMIN — ENOXAPARIN SODIUM 60 MG: 60 INJECTION SUBCUTANEOUS at 21:16

## 2021-04-18 RX ADMIN — METHOCARBAMOL TABLETS 750 MG: 750 TABLET, COATED ORAL at 05:40

## 2021-04-18 RX ADMIN — HYDROMORPHONE HYDROCHLORIDE 1 MG: 1 INJECTION, SOLUTION INTRAMUSCULAR; INTRAVENOUS; SUBCUTANEOUS at 11:42

## 2021-04-18 RX ADMIN — ACETAMINOPHEN 975 MG: 325 TABLET ORAL at 05:40

## 2021-04-18 RX ADMIN — METHOCARBAMOL TABLETS 750 MG: 750 TABLET, COATED ORAL at 23:40

## 2021-04-18 RX ADMIN — BISACODYL 5 MG: 5 TABLET, COATED ORAL at 18:46

## 2021-04-18 RX ADMIN — CARVEDILOL 6.25 MG: 6.25 TABLET, FILM COATED ORAL at 17:00

## 2021-04-18 RX ADMIN — ACETAMINOPHEN 975 MG: 325 TABLET ORAL at 13:02

## 2021-04-18 RX ADMIN — INSULIN LISPRO 4 UNITS: 100 INJECTION, SOLUTION INTRAVENOUS; SUBCUTANEOUS at 21:19

## 2021-04-18 RX ADMIN — HYDROMORPHONE HYDROCHLORIDE 1 MG: 1 INJECTION, SOLUTION INTRAMUSCULAR; INTRAVENOUS; SUBCUTANEOUS at 18:14

## 2021-04-18 RX ADMIN — HYDROMORPHONE HYDROCHLORIDE 6 MG: 2 TABLET ORAL at 05:40

## 2021-04-18 RX ADMIN — POLYETHYLENE GLYCOL 3350 17 G: 17 POWDER, FOR SOLUTION ORAL at 11:43

## 2021-04-18 RX ADMIN — ACETAMINOPHEN 975 MG: 325 TABLET ORAL at 21:16

## 2021-04-18 RX ADMIN — METHOCARBAMOL TABLETS 750 MG: 750 TABLET, COATED ORAL at 11:43

## 2021-04-18 RX ADMIN — CHLORHEXIDINE GLUCONATE 0.12% ORAL RINSE 15 ML: 1.2 LIQUID ORAL at 09:31

## 2021-04-18 RX ADMIN — CARVEDILOL 6.25 MG: 6.25 TABLET, FILM COATED ORAL at 09:31

## 2021-04-18 RX ADMIN — HYDROMORPHONE HYDROCHLORIDE 1 MG: 1 INJECTION, SOLUTION INTRAMUSCULAR; INTRAVENOUS; SUBCUTANEOUS at 23:40

## 2021-04-18 RX ADMIN — GABAPENTIN 200 MG: 100 CAPSULE ORAL at 21:15

## 2021-04-18 NOTE — QUICK NOTE
Postop check no was placed late due to patient care but patient was seen earlier this afternoon    Patient is status post washout of his left groin/thigh wound and wound VAC change  Doing well  Denies any chest pain or shortness of breath  Does have some pain  His vital signs are stable  Wound VAC putting about 150 cc of serosanguineous output  Wound VAC without any leak      Plan:  Tentatively wound VAC change next Tuesday  Diet as tolerated

## 2021-04-18 NOTE — PROGRESS NOTES
Progress Note - General Surgery   Con Geeta 45 y o  male MRN: [id number]  Unit/Bed#: [facility] 12-46 Encounter: [id number]    Assessment:  Assessment:  45 y o  M with L thigh soft tissue mass s/p excision and vac placement (4/6/21)     VAC: 260 cc SS output recorded    Plan:  · CV diet  · Monitor VAC output  · History of CAD- appreciate cards input  · Pain Control  ? Appreciate APS recs  · PT/OT  · I/Os  · DVT ppx  · OOB as tolerated  · Incentive Spirometry    Subjective/Objective     Subjective:   No acute events overnight  Pertinent review of systems as above  All other review of systems negative  Objective:    Blood pressure 99/57, pulse 61, temperature 97 8 °F (36 6 °C), temperature source Oral, resp  rate 20, height 5' 6 75" (1 695 m), weight (!) 161 kg (354 lb 4 5 oz), SpO2 93 %  ,Body mass index is 55 9 kg/m²  Intake/Output Summary (Last 24 hours) at 4/18/2021 0535  Last data filed at 4/17/2021 2159  Gross per 24 hour   Intake 1960 ml   Output 2275 ml   Net -315 ml       Invasive Devices     Peripheral Intravenous Line            Peripheral IV 04/16/21 Proximal;Right;Ventral (anterior) Forearm 2 days          Drain            Negative Pressure Wound Therapy (V A C ) Other (Comment) 11 days                Physical Exam:   Gen:  NAD  HEENT: NCAT  MMM  CV: well perfused  Lungs: Normal respiratory effort  Abd: soft, nt/nd,  Skin: warm/ dry  Extremities: no peripheral edema, no clubbing or cyanosis, VAC intact with good seal  Neuro:  AxO x3      Results from last 7 days   Lab Units 04/17/21  0638 04/16/21  0543 04/15/21  0533   WBC Thousand/uL 12 69* 14 26* 12 89*   HEMOGLOBIN g/dL 10 3* 9 9* 9 9*   HEMATOCRIT % 33 9* 32 0* 31 7*   PLATELETS Thousands/uL 517* 533* 514*     Results from last 7 days   Lab Units 04/17/21  0638 04/16/21  0543 04/12/21  0522   POTASSIUM mmol/L 4 4 4 0 4 0   CHLORIDE mmol/L 106 104 98*   CO2 mmol/L 28 30 31   BUN mg/dL 13 12 12   CREATININE mg/dL 0 95 1 03 1 00   CALCIUM mg/dL 8 9 8 7 9 2            I have personally reviewed pertinent films in PACS      Medications:   Scheduled Meds:  Current Facility-Administered Medications   Medication Dose Route Frequency Provider Last Rate    acetaminophen  975 mg Oral [facility] Ana Maria Dickerson MD      aspirin  81 mg Oral Daily Ana Maria Dickerson MD      atorvastatin  80 mg Oral Daily With Dinner Ana Maria Dickerson MD      carvedilol  6 25 mg Oral BID With Meals Ana Maria Dickerson MD      cefazolin  3,000 mg Intravenous Once Ana Maria Dickerson MD      chlorhexidine  15 mL Swish & Spit Q12H [facility] Ana Maria Dickerson MD      enoxaparin  60 mg Subcutaneous Q12H [facility] Ana Maria Dickerson MD      gabapentin  200 mg Oral TID Ana Maria Dickerson MD      HYDROmorphone  1 mg Intravenous Q4H PRN Ana Maria Dickerson MD      HYDROmorphone  4 mg Oral Q4H PRN Ana Maria Dickerson MD      Or   Aylin Doyle HYDROmorphone  6 mg Oral Q4H PRN Ana Maria Dickerson MD      insulin lispro  4-20 Units Subcutaneous 4x Daily (AC & HS) Ana Maria Dickerson MD      lactated ringers  50 mL/hr Intravenous Continuous Nuris Mendoza CRNA Stopped (04/17/21 1347)    methocarbamol  750 mg Oral Q6H [facility] Ana Maria Dickerson MD      naloxone  0 1 mg Intravenous Q3 min PRN Ana Maria Dickerson MD      polyethylene glycol  17 g Oral Daily PRN Ana Maria Dickerson MD      senna-docusate sodium  1 tablet Oral HS Ana Maria Dickerson MD      sodium chloride  1 spray Each Nare Q1H PRN Ana Maria Dickerson MD      ticagrelor  90 mg Oral Q12H [facility] Ana Maria Dickerson MD       Continuous Infusions:lactated ringers, 50 mL/hr, Last Rate: Stopped (04/17/21 1347)      PRN Meds:  HYDROmorphone, 1 mg, Q4H PRN  HYDROmorphone, 4 mg, Q4H PRN    Or  HYDROmorphone, 6 mg, Q4H PRN  naloxone, 0 1 mg, Q3 min PRN  polyethylene glycol, 17 g, Daily PRN  sodium chloride, 1 spray, Q1H PRN      VTE Pharmacologic Prophylaxis: Enoxaparin (Lovenox)  VTE Mechanical Prophylaxis: sequential compression device

## 2021-04-18 NOTE — PLAN OF CARE
Problem: Prexisting or High Potential for Compromised Skin Integrity  Goal: Skin integrity is maintained or improved  Description: INTERVENTIONS:  - Identify patients at risk for skin breakdown  - Assess and monitor skin integrity  - Assess and monitor nutrition and hydration status  - Monitor labs   - Assess for incontinence   - Turn and reposition patient  - Assist with mobility/ambulation  - Relieve pressure over bony prominences  - Avoid friction and shearing  - Provide appropriate hygiene as needed including keeping skin clean and dry  - Evaluate need for skin moisturizer/barrier cream  - Collaborate with interdisciplinary team   - Patient/family teaching  - Consider wound care consult   Outcome: Progressing     Problem: SAFETY,RESTRAINT: NV/NON-SELF DESTRUCTIVE BEHAVIOR  Goal: Remains free of harm/injury (restraint for non violent/non self-detsructive behavior)  Description: INTERVENTIONS:  - Instruct patient/family regarding restraint use   - Assess and monitor physiologic and psychological status   - Provide interventions and comfort measures to meet assessed patient needs   - Identify and implement measures to help patient regain control  - Assess readiness for release of restraint   Outcome: Progressing  Goal: Returns to optimal restraint-free functioning  Description: INTERVENTIONS:  - Assess the patient's behavior and symptoms that indicate continued need for restraint  - Identify and implement measures to help patient regain control  - Assess readiness for release of restraint   Outcome: Progressing     Problem: PAIN - ADULT  Goal: Verbalizes/displays adequate comfort level or baseline comfort level  Description: Interventions:  - Encourage patient to monitor pain and request assistance  - Assess pain using appropriate pain scale  - Administer analgesics based on type and severity of pain and evaluate response  - Implement non-pharmacological measures as appropriate and evaluate response  - Consider cultural and social influences on pain and pain management  - Notify physician/advanced practitioner if interventions unsuccessful or patient reports new pain  Outcome: Progressing     Problem: INFECTION - ADULT  Goal: Absence or prevention of progression during hospitalization  Description: INTERVENTIONS:  - Assess and monitor for signs and symptoms of infection  - Monitor lab/diagnostic results  - Monitor all insertion sites, i e  indwelling lines, tubes, and drains  - Monitor endotracheal if appropriate and nasal secretions for changes in amount and color  - [place] appropriate cooling/warming therapies per order  - Administer medications as ordered  - Instruct and encourage patient and family to use good hand hygiene technique  - Identify and instruct in appropriate isolation precautions for identified infection/condition  Outcome: Progressing     Problem: SAFETY ADULT  Goal: Patient will remain free of falls  Description: INTERVENTIONS:  - Assess patient frequently for physical needs  -  Identify cognitive and physical deficits and behaviors that affect risk of falls    -  [place] fall precautions as indicated by assessment   - Educate patient/family on patient safety including physical limitations  - Instruct patient to call for assistance with activity based on assessment  - Modify environment to reduce risk of injury  - Consider OT/PT consult to assist with strengthening/mobility  Outcome: Progressing  Goal: Maintain or return to baseline ADL function  Description: INTERVENTIONS:  -  Assess patient's ability to carry out ADLs; assess patient's baseline for ADL function and identify physical deficits which impact ability to perform ADLs (bathing, care of mouth/teeth, toileting, grooming, dressing, etc )  - Assess/evaluate cause of self-care deficits   - Assess range of motion  - Assess patient's mobility; develop plan if impaired  - Assess patient's need for assistive devices and provide as appropriate  - Encourage maximum independence but intervene and supervise when necessary  - Involve family in performance of ADLs  - Assess for home care needs following discharge   - Consider OT consult to assist with ADL evaluation and planning for discharge  - Provide patient education as appropriate  Outcome: Progressing  Goal: Maintain or return mobility status to optimal level  Description: INTERVENTIONS:  - Assess patient's baseline mobility status (ambulation, transfers, stairs, etc )    - Identify cognitive and physical deficits and behaviors that affect mobility  - Identify mobility aids required to assist with transfers and/or ambulation (gait belt, sit-to-stand, lift, walker, cane, etc )  - [place] fall precautions as indicated by assessment  - Record patient progress and toleration of activity level on Mobility SBAR; progress patient to next Phase/Stage  - Instruct patient to call for assistance with activity based on assessment  - Consider rehabilitation consult to assist with strengthening/weightbearing, etc   Outcome: Progressing     Problem: DISCHARGE PLANNING  Goal: Discharge to home or other facility with appropriate resources  Description: INTERVENTIONS:  - Identify barriers to discharge w/patient and caregiver  - Arrange for needed discharge resources and transportation as appropriate  - Identify discharge learning needs (meds, wound care, etc )  - Arrange for interpretive services to assist at discharge as needed  - Refer to Case Management Department for coordinating discharge planning if the patient needs post-hospital services based on physician/advanced practitioner order or complex needs related to functional status, cognitive ability, or social support system  Outcome: Progressing     Problem: Knowledge Deficit  Goal: Patient/family/caregiver demonstrates understanding of disease process, treatment plan, medications, and discharge instructions  Description: Complete learning assessment and assess knowledge base  Interventions:  - Provide teaching at level of understanding  - Provide teaching via preferred learning methods  Outcome: Progressing     Problem: CARDIOVASCULAR - ADULT  Goal: Maintains optimal cardiac output and hemodynamic stability  Description: INTERVENTIONS:  - Monitor I/O, vital signs and rhythm  - Monitor for S/S and trends of decreased cardiac output  - Administer and titrate ordered vasoactive medications to optimize hemodynamic stability  - Assess quality of pulses, skin color and temperature  - Assess for signs of decreased coronary artery perfusion  - Instruct patient to report change in severity of symptoms  Outcome: Progressing  Goal: Absence of cardiac dysrhythmias or at baseline rhythm  Description: INTERVENTIONS:  - Continuous cardiac monitoring, vital signs, obtain 12 lead EKG if ordered  - Administer antiarrhythmic and heart rate control medications as ordered  - Monitor electrolytes and administer replacement therapy as ordered  Outcome: Progressing     Problem: RESPIRATORY - ADULT  Goal: Achieves optimal ventilation and oxygenation  Description: INTERVENTIONS:  - Assess for changes in respiratory status  - Assess for changes in mentation and behavior  - Position to facilitate oxygenation and minimize respiratory effort  - Oxygen administered by appropriate delivery if ordered  - Initiate smoking cessation education as indicated  - Encourage broncho-pulmonary hygiene including cough, deep breathe, Incentive Spirometry  - Assess the need for suctioning and aspirate as needed  - Assess and instruct to report SOB or any respiratory difficulty  - Respiratory Therapy support as indicated  Outcome: Progressing     Problem: Potential for Falls  Goal: Patient will remain free of falls  Description: INTERVENTIONS:  - Assess patient frequently for physical needs  -  Identify cognitive and physical deficits and behaviors that affect risk of falls    -  [place] fall precautions as indicated by assessment   - Educate patient/family on patient safety including physical limitations  - Instruct patient to call for assistance with activity based on assessment  - Modify environment to reduce risk of injury  - Consider OT/PT consult to assist with strengthening/mobility  Outcome: Progressing     Problem: Nutrition/Hydration-ADULT  Goal: Nutrient/Hydration intake appropriate for improving, restoring or maintaining nutritional needs  Description: Monitor and assess patient's nutrition/hydration status for malnutrition  Collaborate with interdisciplinary team and initiate plan and interventions as ordered  Monitor patient's weight and dietary intake as ordered or per policy  Utilize nutrition screening tool and intervene as necessary  Determine patient's food preferences and provide high-protein, high-caloric foods as appropriate       INTERVENTIONS:  - Monitor oral intake, urinary output, labs, and treatment plans  - Assess nutrition and hydration status and recommend course of action  - Evaluate amount of meals eaten  - Assist patient with eating if necessary   - Allow adequate time for meals  - Recommend/ encourage appropriate diets, oral nutritional supplements, and vitamin/mineral supplements  - Order, calculate, and assess calorie counts as needed  - Recommend, monitor, and adjust tube feedings and TPN/PPN based on assessed needs  - Assess need for intravenous fluids  - Provide specific nutrition/hydration education as appropriate  - Include patient/family/caregiver in decisions related to nutrition  Outcome: Progressing

## 2021-04-18 NOTE — PROGRESS NOTES
General Cardiology Progress Note - Robert Carroll 45 y o  male MRN: [id number]    Unit/Bed#: [facility] 512-01 Encounter: [id number]      Assessment:    Principal Problem:    Left groin mass  Active Problems:    CAD (coronary artery disease)    Class 3 severe obesity due to excess calories with serious comorbidity and body mass index (BMI) of 50 0 to 59 9 in [facility])    CAD  ( SABINE TO CLX in 2010 and more recently to mid RCA in December)  Morbid obesity  LLE soft tissue lesion   Hidradenitis  S/p exicision and wound vac placement 4/6, s/p washout of his left groin/thigh wound and wound VAC change 4/17  Hypertension  Hyperlipidemia  DM  Hypoxic respiratory failure, resolved    Plan:  Continue medical therapy for CAD  No chest pain or shortness of breath  Normal biventricular systolic function on echocardiogram  Tentatively wound VAC change next Tuesday  Stable from cardiac standpoint    Subjective:   Patient seen and examined  No significant events overnight  Review of Systems   Respiratory: Negative for chest tightness and shortness of breath  Cardiovascular: Negative for chest pain, palpitations and leg swelling  Neurological: Negative for dizziness and light-headedness  Objective: Intake/ Output: 1960/2385, -425  Weight: 355 lbs  Tele: off      Vitals: Blood pressure 119/51, pulse 65, temperature 97 8 °F (36 6 °C), temperature source Oral, resp  rate 20, height 5' 6 75" (1 695 m), weight (!) 161 kg (355 lb 6 1 oz), SpO2 93 %  , Body mass index is 56 08 kg/m² , I/O last 3 completed shifts: In: 2360 [P O :1660;  I V :700]  Out: 6194 [Urine:3125; Drains:510; Blood:50]  I/O this shift:  In: -   Out: 700 [Urine:600; Drains:100]  Wt Readings from Last 3 Encounters:   04/18/21 (!) 161 kg (355 lb 6 1 oz)   04/01/21 (!) 168 kg (370 lb 12 8 oz)   03/31/21 (!) 167 kg (368 lb 9 6 oz)       Intake/Output Summary (Last 24 hours) at 4/18/2021 1143  Last data filed at 4/18/2021 0901  Gross per 24 hour   Intake 1960 ml   Output 3085 ml   Net -1125 ml     I/O last 3 completed shifts: In: 2360 [P O :1660; I V :700]  Out: 6037 [Urine:3125; Drains:510; Blood:50]    No significant arrhythmias seen on telemetry review         Physical Exam:  Vitals:    04/17/21 2008 04/17/21 2328 04/18/21 0543 04/18/21 0734   BP: 125/62 99/57  119/51   BP Location:       Pulse: 56 61  65   Resp:       Temp:       TempSrc:       SpO2: 94% 93%     Weight:   (!) 161 kg (355 lb 6 1 oz)    Height:           GEN: Simeon Mark appears well, alert and oriented x 3, pleasant and cooperative   HEENT: NC/AT, moist mucosa, anicteric sclerae; extraocular muscles intact  NECK: supple, no carotid bruits   HEART: regular rhythm, normal S1 and S2, no murmurs, clicks, gallops or rubs, JVP is mildly elevated  LUNGS: clear to auscultation bilaterally; no wheezes, rales, or rhonchi   ABDOMEN: normal bowel sounds, soft, no tenderness, no distention  EXTREMITIES: peripheral pulses normal; no clubbing, cyanosis, or edema  NEURO: no focal findings   SKIN: normal without suspicious lesions on exposed skin, wound vac on left thigh in place      Current Facility-Administered Medications:     acetaminophen (TYLENOL) tablet 975 mg, 975 mg, Oral, Q8H [facility], Ana Maria Dickerson MD, 975 mg at 04/18/21 0540    aspirin chewable tablet 81 mg, 81 mg, Oral, Daily, Ana Maria Dickerson MD, 81 mg at 04/18/21 0931    atorvastatin (LIPITOR) tablet 80 mg, 80 mg, Oral, Daily With Dinner, Ana Maria Dickerson MD, 80 mg at 04/17/21 1656    carvedilol (COREG) tablet 6 25 mg, 6 25 mg, Oral, BID With Meals, Ana Maria Dickerson MD, 6 25 mg at 04/18/21 0931    ceFAZolin (ANCEF) 3,000 mg in dextrose 5% 100 ml IVPB, 3,000 mg, Intravenous, Once, Ana Maria Dickerson MD    chlorhexidine (PERIDEX) 0 12 % oral rinse 15 mL, 15 mL, Swish & Spit, Q12H [facility], Ana Maria Dickerson MD, 15 mL at 04/18/21 0931    enoxaparin (LOVENOX) subcutaneous injection 60 mg, 60 mg, Subcutaneous, Q12H [facility], Ana Maria Dickerson MD, 60 mg at 04/18/21 0931   gabapentin (NEURONTIN) capsule 200 mg, 200 mg, Oral, TID, Clover Dickerson MD, 200 mg at 04/18/21 0931    HYDROmorphone (DILAUDID) injection 1 mg, 1 mg, Intravenous, Q4H PRN, Clover Dickerson MD, 1 mg at 04/18/21 1142    HYDROmorphone (DILAUDID) tablet 4 mg, 4 mg, Oral, Q4H PRN, 4 mg at 04/16/21 1435 **OR** HYDROmorphone (DILAUDID) tablet 6 mg, 6 mg, Oral, Q4H PRN, Clover Dickerson MD, 6 mg at 04/18/21 0540    insulin lispro (HumaLOG) 100 units/mL subcutaneous injection 4-20 Units, 4-20 Units, Subcutaneous, 4x Daily (AC & HS), 4 Units at 04/17/21 2201 **AND** Fingerstick Glucose (POCT), , , 4x Daily AC and at bedtime, Clover Dickerson MD    lactated ringers infusion, 50 mL/hr, Intravenous, Continuous, Constantino Yoo, [place], Stopped at 04/17/21 1347    methocarbamol (ROBAXIN) tablet 750 mg, 750 mg, Oral, Q6H [address], Clover Dickerson MD, 750 mg at 04/18/21 1143    naloxone [facility]) injection 0 1 mg, 0 1 mg, Intravenous, Q3 min PRN, Clover Dickerson MD    polyethylene glycol (MIRALAX) packet 17 g, 17 g, Oral, Daily PRN, Clover Dickerson MD, 17 g at 04/18/21 1143    senna-docusate sodium (SENOKOT S) 8 6-50 mg per tablet 1 tablet, 1 tablet, Oral, HS, Clover Dickerson MD, 1 tablet at 04/17/21 2016    sodium chloride (OCEAN) 0 65 % nasal spray 1 spray, 1 spray, Each Nare, Q1H PRN, Clover Dickerson MD    ticagrelor (BRILINTA) tablet 90 mg, 90 mg, Oral, Q12H [address], Clover Dickerson MD, 90 mg at 04/18/21 0931      Labs & Results:        Results from last 7 days   Lab Units 04/18/21  0521 04/17/21  0638 04/16/21  0543   WBC Thousand/uL 12 84* 12 69* 14 26*   HEMOGLOBIN g/dL 9 2* 10 3* 9 9*   HEMATOCRIT % 30 5* 33 9* 32 0*   PLATELETS Thousands/uL 503* 517* 533*         Results from last 7 days   Lab Units 04/18/21  0521 04/17/21  0638 04/16/21  0543   POTASSIUM mmol/L 4 4 4 4 4 0   CHLORIDE mmol/L 104 106 104   CO2 mmol/L 28 28 30   BUN mg/dL 13 13 12   CREATININE mg/dL 0 96 0 95 1 03   CALCIUM mg/dL 8 8 8 9 8 7           Gin Resendiz MD  ADVANCED HEART FAIL[facility]

## 2021-04-19 LAB
ANION GAP SERPL CALCULATED.3IONS-SCNC: 3 MMOL/L (ref 4–13)
BASOPHILS # BLD AUTO: 0.06 THOUSANDS/ΜL (ref 0–0.1)
BASOPHILS NFR BLD AUTO: 1 % (ref 0–1)
BUN SERPL-MCNC: 14 MG/DL (ref 5–25)
CALCIUM SERPL-MCNC: 8.9 MG/DL (ref 8.3–10.1)
CHLORIDE SERPL-SCNC: 102 MMOL/L (ref 100–108)
CO2 SERPL-SCNC: 31 MMOL/L (ref 21–32)
CREAT SERPL-MCNC: 0.9 MG/DL (ref 0.6–1.3)
EOSINOPHIL # BLD AUTO: 0.44 THOUSAND/ΜL (ref 0–0.61)
EOSINOPHIL NFR BLD AUTO: 4 % (ref 0–6)
ERYTHROCYTE [DISTWIDTH] IN BLOOD BY AUTOMATED COUNT: 13.4 % (ref 11.6–15.1)
GFR SERPL CREATININE-BSD FRML MDRD: 107 ML/MIN/1.73SQ M
GLUCOSE SERPL-MCNC: 135 MG/DL (ref 65–140)
GLUCOSE SERPL-MCNC: 153 MG/DL (ref 65–140)
GLUCOSE SERPL-MCNC: 157 MG/DL (ref 65–140)
GLUCOSE SERPL-MCNC: 183 MG/DL (ref 65–140)
GLUCOSE SERPL-MCNC: 253 MG/DL (ref 65–140)
HCT VFR BLD AUTO: 30.2 % (ref 36.5–49.3)
HGB BLD-MCNC: 9.1 G/DL (ref 12–17)
IMM GRANULOCYTES # BLD AUTO: 0.13 THOUSAND/UL (ref 0–0.2)
IMM GRANULOCYTES NFR BLD AUTO: 1 % (ref 0–2)
LYMPHOCYTES # BLD AUTO: 2.3 THOUSANDS/ΜL (ref 0.6–4.47)
LYMPHOCYTES NFR BLD AUTO: 20 % (ref 14–44)
MCH RBC QN AUTO: 26.8 PG (ref 26.8–34.3)
MCHC RBC AUTO-ENTMCNC: 30.1 G/DL (ref 31.4–37.4)
MCV RBC AUTO: 89 FL (ref 82–98)
MONOCYTES # BLD AUTO: 0.78 THOUSAND/ΜL (ref 0.17–1.22)
MONOCYTES NFR BLD AUTO: 7 % (ref 4–12)
NEUTROPHILS # BLD AUTO: 7.68 THOUSANDS/ΜL (ref 1.85–7.62)
NEUTS SEG NFR BLD AUTO: 67 % (ref 43–75)
NRBC BLD AUTO-RTO: 0 /100 WBCS
PLATELET # BLD AUTO: 479 THOUSANDS/UL (ref 149–390)
PMV BLD AUTO: 8.8 FL (ref 8.9–12.7)
POTASSIUM SERPL-SCNC: 4.1 MMOL/L (ref 3.5–5.3)
RBC # BLD AUTO: 3.39 MILLION/UL (ref 3.88–5.62)
SODIUM SERPL-SCNC: 136 MMOL/L (ref 136–145)
WBC # BLD AUTO: 11.39 THOUSAND/UL (ref 4.31–10.16)

## 2021-04-19 PROCEDURE — 99024 POSTOP FOLLOW-UP VISIT: CPT | Performed by: SURGERY

## 2021-04-19 PROCEDURE — 80048 BASIC METABOLIC PNL TOTAL CA: CPT | Performed by: SURGERY

## 2021-04-19 PROCEDURE — 99232 SBSQ HOSP IP/OBS MODERATE 35: CPT | Performed by: PHYSICIAN ASSISTANT

## 2021-04-19 PROCEDURE — 85025 COMPLETE CBC W/AUTO DIFF WBC: CPT | Performed by: SURGERY

## 2021-04-19 PROCEDURE — 82948 REAGENT STRIP/BLOOD GLUCOSE: CPT

## 2021-04-19 RX ADMIN — HYDROMORPHONE HYDROCHLORIDE 6 MG: 2 TABLET ORAL at 23:27

## 2021-04-19 RX ADMIN — DOCUSATE SODIUM AND SENNOSIDES 1 TABLET: 8.6; 5 TABLET ORAL at 21:23

## 2021-04-19 RX ADMIN — HYDROMORPHONE HYDROCHLORIDE 1 MG: 1 INJECTION, SOLUTION INTRAMUSCULAR; INTRAVENOUS; SUBCUTANEOUS at 06:15

## 2021-04-19 RX ADMIN — GABAPENTIN 200 MG: 100 CAPSULE ORAL at 21:24

## 2021-04-19 RX ADMIN — TICAGRELOR 90 MG: 90 TABLET ORAL at 08:59

## 2021-04-19 RX ADMIN — CARVEDILOL 6.25 MG: 6.25 TABLET, FILM COATED ORAL at 08:31

## 2021-04-19 RX ADMIN — HYDROMORPHONE HYDROCHLORIDE 1 MG: 1 INJECTION, SOLUTION INTRAMUSCULAR; INTRAVENOUS; SUBCUTANEOUS at 21:22

## 2021-04-19 RX ADMIN — GABAPENTIN 200 MG: 100 CAPSULE ORAL at 08:59

## 2021-04-19 RX ADMIN — INSULIN LISPRO 4 UNITS: 100 INJECTION, SOLUTION INTRAVENOUS; SUBCUTANEOUS at 08:31

## 2021-04-19 RX ADMIN — INSULIN LISPRO 4 UNITS: 100 INJECTION, SOLUTION INTRAVENOUS; SUBCUTANEOUS at 21:24

## 2021-04-19 RX ADMIN — CHLORHEXIDINE GLUCONATE 0.12% ORAL RINSE 15 ML: 1.2 LIQUID ORAL at 09:00

## 2021-04-19 RX ADMIN — METHOCARBAMOL TABLETS 750 MG: 750 TABLET, COATED ORAL at 11:30

## 2021-04-19 RX ADMIN — HYDROMORPHONE HYDROCHLORIDE 6 MG: 2 TABLET ORAL at 09:33

## 2021-04-19 RX ADMIN — ENOXAPARIN SODIUM 60 MG: 60 INJECTION SUBCUTANEOUS at 21:24

## 2021-04-19 RX ADMIN — CHLORHEXIDINE GLUCONATE 0.12% ORAL RINSE 15 ML: 1.2 LIQUID ORAL at 21:22

## 2021-04-19 RX ADMIN — ENOXAPARIN SODIUM 60 MG: 60 INJECTION SUBCUTANEOUS at 09:09

## 2021-04-19 RX ADMIN — METHOCARBAMOL TABLETS 750 MG: 750 TABLET, COATED ORAL at 23:27

## 2021-04-19 RX ADMIN — INSULIN LISPRO 8 UNITS: 100 INJECTION, SOLUTION INTRAVENOUS; SUBCUTANEOUS at 11:31

## 2021-04-19 RX ADMIN — HYDROMORPHONE HYDROCHLORIDE 1 MG: 1 INJECTION, SOLUTION INTRAMUSCULAR; INTRAVENOUS; SUBCUTANEOUS at 17:20

## 2021-04-19 RX ADMIN — GABAPENTIN 200 MG: 100 CAPSULE ORAL at 15:15

## 2021-04-19 RX ADMIN — HYDROMORPHONE HYDROCHLORIDE 1 MG: 1 INJECTION, SOLUTION INTRAMUSCULAR; INTRAVENOUS; SUBCUTANEOUS at 11:30

## 2021-04-19 RX ADMIN — METHOCARBAMOL TABLETS 750 MG: 750 TABLET, COATED ORAL at 05:08

## 2021-04-19 RX ADMIN — ACETAMINOPHEN 975 MG: 325 TABLET ORAL at 13:53

## 2021-04-19 RX ADMIN — INSULIN LISPRO 4 UNITS: 100 INJECTION, SOLUTION INTRAVENOUS; SUBCUTANEOUS at 17:03

## 2021-04-19 RX ADMIN — HYDROMORPHONE HYDROCHLORIDE 6 MG: 2 TABLET ORAL at 19:31

## 2021-04-19 RX ADMIN — HYDROMORPHONE HYDROCHLORIDE 6 MG: 2 TABLET ORAL at 05:08

## 2021-04-19 RX ADMIN — ASPIRIN 81 MG: 81 TABLET, CHEWABLE ORAL at 08:59

## 2021-04-19 RX ADMIN — TICAGRELOR 90 MG: 90 TABLET ORAL at 21:23

## 2021-04-19 RX ADMIN — ACETAMINOPHEN 975 MG: 325 TABLET ORAL at 21:23

## 2021-04-19 RX ADMIN — HYDROMORPHONE HYDROCHLORIDE 6 MG: 2 TABLET ORAL at 15:14

## 2021-04-19 RX ADMIN — CARVEDILOL 6.25 MG: 6.25 TABLET, FILM COATED ORAL at 17:04

## 2021-04-19 RX ADMIN — ACETAMINOPHEN 975 MG: 325 TABLET ORAL at 05:08

## 2021-04-19 RX ADMIN — POLYETHYLENE GLYCOL 3350 17 G: 17 POWDER, FOR SOLUTION ORAL at 09:00

## 2021-04-19 RX ADMIN — METHOCARBAMOL TABLETS 750 MG: 750 TABLET, COATED ORAL at 17:05

## 2021-04-19 RX ADMIN — ATORVASTATIN CALCIUM 80 MG: 80 TABLET, FILM COATED ORAL at 17:04

## 2021-04-19 NOTE — PROGRESS NOTES
Progress Note - General Surgery   Rafa Gonzalez 45 y o  male MRN: [id number]  Unit/Bed#: [facility] 512-01 Encounter: [id number]    Assessment:  45 y o  M with L thigh soft tissue mass s/p excision and vac placement 4/6  S/p L thigh washout and vac placement 4/17    Afebrile  VSS  350 cc serosanguinous drainage out of wound vac    Plan:  Continue diabetic diet  Trend WBC/fever curve  Tuesday/Thursday/Sunday Vac changes  APS following for pain control  DVT ppx  OOB/Ambulate  CM for dispo planning     Subjective/Objective     Subjective: No acute events overnight  States his pain is not controlled on the current regimen  +flatulence/BM  Tolerating diet without nausea/vomiting  No fevers, chills  Objective:     Blood pressure 104/59, pulse 57, temperature 98 °F (36 7 °C), temperature source Oral, resp  rate 18, height 5' 6 75" (1 695 m), weight (!) 161 kg (354 lb 8 oz), SpO2 95 %  ,Body mass index is 55 94 kg/m²  Intake/Output Summary (Last 24 hours) at 4/19/2021 4960  Last data filed at 4/19/2021 0511  Gross per 24 hour   Intake 1200 ml   Output 3425 ml   Net -2225 ml       Invasive Devices     Peripheral Intravenous Line            Peripheral IV 04/16/21 Proximal;Right;Ventral (anterior) Forearm 3 days          Drain            Negative Pressure Wound Therapy (V A C ) Other (Comment) 12 days                Physical Exam:   NAD, alert and oriented x3  Normocephalic, atraumatic  MMM, EOMI, PERRLA  Norm resp effort on RA  RRR  Abd soft, NT/ND  L groin wound vac in place with serosanguinous drainage in canister     No calf tenderness or peripheral edema  Motor/sensation intact in distal extremities  CN grossly intact  -rash/lesions      Lab, Imaging and other studies:  CBC:   Lab Results   Component Value Date    WBC 11 39 (H) 04/19/2021    HGB 9 1 (L) 04/19/2021    HCT 30 2 (L) 04/19/2021    MCV 89 04/19/2021     (H) 04/19/2021    MCH 26 8 04/19/2021    MCHC 30 1 (L) 04/19/2021    RDW 13 4 04/19/2021 MPV 8 8 (L) 04/19/2021    NRBC 0 04/19/2021   , CMP:   Lab Results   Component Value Date    SODIUM 136 04/19/2021    K 4 1 04/19/2021     04/19/2021    CO2 31 04/19/2021    BUN 14 04/19/2021    CREATININE 0 90 04/19/2021    CALCIUM 8 9 04/19/2021    EGFR 107 04/19/2021     VTE Pharmacologic Prophylaxis: Enoxaparin (Lovenox)  VTE Mechanical Prophylaxis: sequential compression device

## 2021-04-19 NOTE — PROGRESS NOTES
Progress Note - Acute Pain Service    Sofy Short 45 y o  male MRN: [id number]  Unit/Bed#: [facility] 512-01 Encounter: [id number]      Assessment:   Principal Problem:    Left groin mass  Active Problems:    CAD (coronary artery disease)    Class 3 severe obesity due to excess calories with serious comorbidity and body mass index (BMI) of 50 0 to 59 9 in [facility])    Sfoy Short is a 45 y o  male  Admitted on 4/6/21 for mass excision of hidradenitis in the left proximal thigh with VAC dressing placement   Patient's pain has been difficult to control, therefore APS consulted  Plan:    Continue Tylenol 975 mg p o  q 8 hours scheduled   Continue Dilaudid 4 mg p o  q 4 hours p r n  moderate pain   Continue Dilaudid 6 mg p o  q 4 hours p r n  severe pain   Decreased Dilaudid to 0 5 mg IV q 4 hours p r n  breakthrough pain, plan to discontinue tomorrow   Increase Neurontin to 300 mg p o  t i d  scheduled   Continue Robaxin 750 mg p o  q 6 hours scheduled   Continue bowel regimen to avoid opioid induced constipation  APS will continue to follow  Please call  / [phone] or [facility] (/ between [phone] and on weekends) with questions or concerns    Pain History  Current pain location(s):   Left leg  Pain Scale:    9/10  Quality:  burning  24 hour history:  Patient continues to complain of severe pain despite current regimen  Opioid requirement previous 24 hours:  Dilaudid 30 mg p o , Dilaudid 4 mg IV        Meds/Allergies   all current active meds have been reviewed, current meds:   Current Facility-Administered Medications   Medication Dose Route Frequency    acetaminophen (TYLENOL) tablet 975 mg  975 mg Oral Q8H [address]    aspirin chewable tablet 81 mg  81 mg Oral Daily    atorvastatin (LIPITOR) tablet 80 mg  80 mg Oral Daily With Dinner    bisacodyl (DULCOLAX) EC tablet 5 mg  5 mg Oral Daily PRN    carvedilol (COREG) tablet 6 25 mg  6 25 mg Oral BID With Meals    ceFAZolin (ANCEF) 3,000 mg in dextrose 5% 100 ml IVPB  3,000 mg Intravenous Once    chlorhexidine (PERIDEX) 0 12 % oral rinse 15 mL  15 mL Swish & Spit Q12H [facility]    enoxaparin (LOVENOX) subcutaneous injection 60 mg  60 mg Subcutaneous Q12H [facility]    gabapentin (NEURONTIN) capsule 200 mg  200 mg Oral TID    HYDROmorphone (DILAUDID) injection 1 mg  1 mg Intravenous Q4H PRN    HYDROmorphone (DILAUDID) tablet 4 mg  4 mg Oral Q4H PRN    Or    HYDROmorphone (DILAUDID) tablet 6 mg  6 mg Oral Q4H PRN    insulin lispro (HumaLOG) 100 units/mL subcutaneous injection 4-20 Units  4-20 Units Subcutaneous 4x Daily (AC & HS)    lactated ringers infusion  50 mL/hr Intravenous Continuous    methocarbamol (ROBAXIN) tablet 750 mg  750 mg Oral Q6H [facility]    naloxone (NARCAN) injection 0 1 mg  0 1 mg Intravenous Q3 min PRN    polyethylene glycol (MIRALAX) packet 17 g  17 g Oral Daily    senna-docusate sodium (SENOKOT S) 8 6-50 mg per tablet 1 tablet  1 tablet Oral HS    sodium chloride (OCEAN) 0 65 % nasal spray 1 spray  1 spray Each Nare Q1H PRN    ticagrelor (BRILINTA) tablet 90 mg  90 mg Oral Q12H [facility]    and PTA meds:   Prior to Admission Medications   Prescriptions Last Dose Informant Patient Reported? Taking?    Omega-3 Fatty Acids (fish oil) 1,000 mg 4/5/2021 at 0500  No Yes   Sig: Take 1 capsule (1,000 mg total) by mouth 2 (two) times a day   Ticagrelor (BRILINTA PO) 4/5/2021 at 0500  Yes Yes   Sig: Take by mouth   amLODIPine (NORVASC) 10 mg tablet 4/5/2021 at 0500  No Yes   Sig: take 1 tablet by mouth once daily   aspirin (ECOTRIN LOW STRENGTH) 81 mg EC tablet 4/1/2021  No Yes   Sig: Take 1 tablet (81 mg total) by mouth daily   atorvastatin (LIPITOR) 80 mg tablet 4/4/2021 at 1900  No Yes   Sig: Take 1 tablet (80 mg total) by mouth daily   carvedilol (COREG) 6 25 mg tablet 4/5/2021 at 0500  No Yes   Sig: Take 1 tablet (6 25 mg total) by mouth 2 (two) times a day with meals   clindamycin (CLEOCIN) 150 mg capsule 4/5/2021 at 1700  No Yes   Sig: Take 1 capsule (150 mg total) by mouth every 12 (twelve) hours for 6 days   glimepiride (AMARYL) 4 mg tablet 4/5/2021 at 0500  No Yes   Sig: Take 1 tablet (4 mg total) by mouth daily with breakfast   lisinopril (ZESTRIL) 10 mg tablet 4/5/2021 at 0500  No Yes   Sig: Take 1 tablet (10 mg total) by mouth daily   metFORMIN (GLUCOPHAGE) 1000 MG tablet 4/5/2021 at 0500  No Yes   Sig: Take 1 tablet (1,000 mg total) by mouth 2 (two) times a day with meals      Facility-Administered Medications: None       Allergies   Allergen Reactions    Amoxicillin Hives       Objective     Temp:  [97 8 °F (36 6 °C)-98 °F (36 7 °C)] 97 8 °F (36 6 °C)  HR:  [49-63] 49  Resp:  [18-20] 20  BP: (104-133)/(58-88) 115/88    Physical Exam  Vitals signs and nursing note reviewed  Constitutional:       General: He is awake  He is not in acute distress  Comments: Appears uncomfortable  Skin:     General: Skin is warm and dry  Neurological:      Mental Status: He is alert and oriented to person, place, and time  GCS: GCS eye subscore is 4  GCS verbal subscore is 5  GCS motor subscore is 6  Psychiatric:         Behavior: Behavior is cooperative  Lab Results:   Results from last 7 days   Lab Units 04/19/21  0508   WBC Thousand/uL 11 39*   HEMOGLOBIN g/dL 9 1*   HEMATOCRIT % 30 2*   PLATELETS Thousands/uL 479*      Results from last 7 days   Lab Units 04/19/21  0508   POTASSIUM mmol/L 4 1   CHLORIDE mmol/L 102   CO2 mmol/L 31   BUN mg/dL 14   CREATININE mg/dL 0 90   CALCIUM mg/dL 8 9       Imaging Studies: I have personally reviewed pertinent reports  EKG, Pathology, and Other Studies: I have personally reviewed pertinent reports  Counseling / Coordination of Care  Total floor / unit time spent today 30 minutes  Greater than 50% of total time was spent with the patient and / or family counseling and / or coordination of care   A description of the counseling / coordination of care:  Patient interview, physical examination, review of medical record, review of imaging and laboratory data, development of pain management plan, discussion of pain management plan with patient and primary service  Please note that the APS provides consultative services regarding pain management only  With the exception of ketamine and epidural infusions and except when indicated, final decisions regarding starting or changing doses of analgesic medications are at the discretion of the consulting service  Off hours consultation and/or medication management is generally not available      Kishore Schreiber PA-C  Acute Pain Service

## 2021-04-19 NOTE — QUICK NOTE
Nurse-Patient-Provider rounds were completed with the patient's nurse today, Britney Hobbs  We discussed the plan is to continue his diet as tolerated  Continue local wound care to left leg wound with plan for dressing change at bedside on 4/202/2021  If the wound looks clean and healthy, tentative plan for possible discharge in the next 24-48 hours if patient able to be transitioned to an oral analgesic regimen  Plan for outpatient follow-up in the surgical clinic to continue local wound care with plan for eventual skin graft when appropriate  APS was present during rounds and would work with patient to transition to an oral pain regimen  We reviewed all of the invasive devices/lines/telemetry orders   - None  DVT Prophylaxis:  - Lovenox and SCDs  Pain Assessment / Plan:  - Continue current analgesic regimen  Mobility Assessment / Plan:  - Activity as tolerated  Goals / Barriers for discharge:  - Anticipate discharge in the next 24-40 hours if able to tolerate bedside dressing change and be transition to an oral analgesic regimen   - Case management following; case and discharge needs discussed  All questions and concerns were addressed  I spent greater than 15 minutes reviewing the plan with the patient and the nurse, and coordinating care for the day      Paula Kim PA-C  4/19/2021 10:20 AM

## 2021-04-20 LAB
ANION GAP SERPL CALCULATED.3IONS-SCNC: 4 MMOL/L (ref 4–13)
BASOPHILS # BLD AUTO: 0.07 THOUSANDS/ΜL (ref 0–0.1)
BASOPHILS NFR BLD AUTO: 1 % (ref 0–1)
BUN SERPL-MCNC: 14 MG/DL (ref 5–25)
CALCIUM SERPL-MCNC: 8.9 MG/DL (ref 8.3–10.1)
CHLORIDE SERPL-SCNC: 104 MMOL/L (ref 100–108)
CO2 SERPL-SCNC: 30 MMOL/L (ref 21–32)
CREAT SERPL-MCNC: 0.94 MG/DL (ref 0.6–1.3)
EOSINOPHIL # BLD AUTO: 0.4 THOUSAND/ΜL (ref 0–0.61)
EOSINOPHIL NFR BLD AUTO: 3 % (ref 0–6)
ERYTHROCYTE [DISTWIDTH] IN BLOOD BY AUTOMATED COUNT: 13.5 % (ref 11.6–15.1)
GFR SERPL CREATININE-BSD FRML MDRD: 102 ML/MIN/1.73SQ M
GLUCOSE SERPL-MCNC: 114 MG/DL (ref 65–140)
GLUCOSE SERPL-MCNC: 132 MG/DL (ref 65–140)
GLUCOSE SERPL-MCNC: 138 MG/DL (ref 65–140)
GLUCOSE SERPL-MCNC: 204 MG/DL (ref 65–140)
GLUCOSE SERPL-MCNC: 227 MG/DL (ref 65–140)
HCT VFR BLD AUTO: 32.4 % (ref 36.5–49.3)
HGB BLD-MCNC: 9.9 G/DL (ref 12–17)
IMM GRANULOCYTES # BLD AUTO: 0.11 THOUSAND/UL (ref 0–0.2)
IMM GRANULOCYTES NFR BLD AUTO: 1 % (ref 0–2)
LYMPHOCYTES # BLD AUTO: 2.19 THOUSANDS/ΜL (ref 0.6–4.47)
LYMPHOCYTES NFR BLD AUTO: 19 % (ref 14–44)
MCH RBC QN AUTO: 27.5 PG (ref 26.8–34.3)
MCHC RBC AUTO-ENTMCNC: 30.6 G/DL (ref 31.4–37.4)
MCV RBC AUTO: 90 FL (ref 82–98)
MONOCYTES # BLD AUTO: 0.74 THOUSAND/ΜL (ref 0.17–1.22)
MONOCYTES NFR BLD AUTO: 6 % (ref 4–12)
NEUTROPHILS # BLD AUTO: 8.17 THOUSANDS/ΜL (ref 1.85–7.62)
NEUTS SEG NFR BLD AUTO: 70 % (ref 43–75)
NRBC BLD AUTO-RTO: 0 /100 WBCS
PLATELET # BLD AUTO: 578 THOUSANDS/UL (ref 149–390)
PMV BLD AUTO: 9.1 FL (ref 8.9–12.7)
POTASSIUM SERPL-SCNC: 4.1 MMOL/L (ref 3.5–5.3)
RBC # BLD AUTO: 3.6 MILLION/UL (ref 3.88–5.62)
SODIUM SERPL-SCNC: 138 MMOL/L (ref 136–145)
WBC # BLD AUTO: 11.68 THOUSAND/UL (ref 4.31–10.16)

## 2021-04-20 PROCEDURE — 80048 BASIC METABOLIC PNL TOTAL CA: CPT | Performed by: STUDENT IN AN ORGANIZED HEALTH CARE EDUCATION/TRAINING PROGRAM

## 2021-04-20 PROCEDURE — 99232 SBSQ HOSP IP/OBS MODERATE 35: CPT | Performed by: PHYSICIAN ASSISTANT

## 2021-04-20 PROCEDURE — 97168 OT RE-EVAL EST PLAN CARE: CPT

## 2021-04-20 PROCEDURE — 85025 COMPLETE CBC W/AUTO DIFF WBC: CPT | Performed by: STUDENT IN AN ORGANIZED HEALTH CARE EDUCATION/TRAINING PROGRAM

## 2021-04-20 PROCEDURE — 99232 SBSQ HOSP IP/OBS MODERATE 35: CPT | Performed by: INTERNAL MEDICINE

## 2021-04-20 PROCEDURE — 99024 POSTOP FOLLOW-UP VISIT: CPT | Performed by: SURGERY

## 2021-04-20 PROCEDURE — 97535 SELF CARE MNGMENT TRAINING: CPT

## 2021-04-20 PROCEDURE — 82948 REAGENT STRIP/BLOOD GLUCOSE: CPT

## 2021-04-20 RX ADMIN — DOCUSATE SODIUM AND SENNOSIDES 1 TABLET: 8.6; 5 TABLET ORAL at 22:02

## 2021-04-20 RX ADMIN — HYDROMORPHONE HYDROCHLORIDE 1 MG: 1 INJECTION, SOLUTION INTRAMUSCULAR; INTRAVENOUS; SUBCUTANEOUS at 08:32

## 2021-04-20 RX ADMIN — ACETAMINOPHEN 975 MG: 325 TABLET ORAL at 05:04

## 2021-04-20 RX ADMIN — HYDROMORPHONE HYDROCHLORIDE 6 MG: 2 TABLET ORAL at 05:05

## 2021-04-20 RX ADMIN — ENOXAPARIN SODIUM 60 MG: 60 INJECTION SUBCUTANEOUS at 22:02

## 2021-04-20 RX ADMIN — TICAGRELOR 90 MG: 90 TABLET ORAL at 22:02

## 2021-04-20 RX ADMIN — CARVEDILOL 6.25 MG: 6.25 TABLET, FILM COATED ORAL at 08:27

## 2021-04-20 RX ADMIN — INSULIN LISPRO 4 UNITS: 100 INJECTION, SOLUTION INTRAVENOUS; SUBCUTANEOUS at 22:06

## 2021-04-20 RX ADMIN — HYDROMORPHONE HYDROCHLORIDE 6 MG: 2 TABLET ORAL at 22:01

## 2021-04-20 RX ADMIN — HYDROMORPHONE HYDROCHLORIDE 6 MG: 2 TABLET ORAL at 17:59

## 2021-04-20 RX ADMIN — ACETAMINOPHEN 975 MG: 325 TABLET ORAL at 22:01

## 2021-04-20 RX ADMIN — GABAPENTIN 200 MG: 100 CAPSULE ORAL at 16:24

## 2021-04-20 RX ADMIN — METHOCARBAMOL TABLETS 750 MG: 750 TABLET, COATED ORAL at 12:14

## 2021-04-20 RX ADMIN — GABAPENTIN 200 MG: 100 CAPSULE ORAL at 22:01

## 2021-04-20 RX ADMIN — METHOCARBAMOL TABLETS 750 MG: 750 TABLET, COATED ORAL at 17:59

## 2021-04-20 RX ADMIN — POLYETHYLENE GLYCOL 3350 17 G: 17 POWDER, FOR SOLUTION ORAL at 08:29

## 2021-04-20 RX ADMIN — ATORVASTATIN CALCIUM 80 MG: 80 TABLET, FILM COATED ORAL at 16:24

## 2021-04-20 RX ADMIN — TICAGRELOR 90 MG: 90 TABLET ORAL at 08:27

## 2021-04-20 RX ADMIN — CHLORHEXIDINE GLUCONATE 0.12% ORAL RINSE 15 ML: 1.2 LIQUID ORAL at 08:28

## 2021-04-20 RX ADMIN — HYDROMORPHONE HYDROCHLORIDE 6 MG: 2 TABLET ORAL at 10:26

## 2021-04-20 RX ADMIN — HYDROMORPHONE HYDROCHLORIDE 6 MG: 2 TABLET ORAL at 14:14

## 2021-04-20 RX ADMIN — HYDROMORPHONE HYDROCHLORIDE 1 MG: 1 INJECTION, SOLUTION INTRAMUSCULAR; INTRAVENOUS; SUBCUTANEOUS at 16:22

## 2021-04-20 RX ADMIN — GABAPENTIN 200 MG: 100 CAPSULE ORAL at 08:27

## 2021-04-20 RX ADMIN — ASPIRIN 81 MG: 81 TABLET, CHEWABLE ORAL at 08:27

## 2021-04-20 RX ADMIN — INSULIN LISPRO 8 UNITS: 100 INJECTION, SOLUTION INTRAVENOUS; SUBCUTANEOUS at 12:15

## 2021-04-20 RX ADMIN — ENOXAPARIN SODIUM 60 MG: 60 INJECTION SUBCUTANEOUS at 08:28

## 2021-04-20 RX ADMIN — METHOCARBAMOL TABLETS 750 MG: 750 TABLET, COATED ORAL at 05:04

## 2021-04-20 RX ADMIN — HYDROMORPHONE HYDROCHLORIDE 1 MG: 1 INJECTION, SOLUTION INTRAMUSCULAR; INTRAVENOUS; SUBCUTANEOUS at 20:28

## 2021-04-20 RX ADMIN — CHLORHEXIDINE GLUCONATE 0.12% ORAL RINSE 15 ML: 1.2 LIQUID ORAL at 22:01

## 2021-04-20 RX ADMIN — ACETAMINOPHEN 975 MG: 325 TABLET ORAL at 14:13

## 2021-04-20 RX ADMIN — CARVEDILOL 6.25 MG: 6.25 TABLET, FILM COATED ORAL at 16:24

## 2021-04-20 RX ADMIN — HYDROMORPHONE HYDROCHLORIDE 1 MG: 1 INJECTION, SOLUTION INTRAMUSCULAR; INTRAVENOUS; SUBCUTANEOUS at 12:46

## 2021-04-20 NOTE — PROGRESS NOTES
Cardiology Progress Note - Lisa Hale 45 y o  male MRN: [id number]    Unit/Bed#: [facility] 512-01 Encounter: [id number]      Assessment:  Principal Problem:    Left groin mass  Active Problems:    CAD (coronary artery disease)    Class 3 severe obesity due to excess calories with serious comorbidity and body mass index (BMI) of 50 0 to 59 9 in adult [facility])      Plan:  Patient with no issues overnight  He has no chest pain or significant dyspnea  General surgery note appreciated  Patient's vital signs are stable  Will continue carvedilol 6 25 mg twice a day  BMP today with potassium of 4 1 and creatinine of 0 94  Hemoglobin 9 9  Subjective:   Patient seen and examined  No significant events overnight   negative  Objective:     Vitals: Blood pressure 117/55, pulse 60, temperature 98 5 °F (36 9 °C), temperature source Oral, resp  rate 20, height 5' 6 75" (1 695 m), weight (!) 160 kg (353 lb 6 4 oz), SpO2 93 %  , Body mass index is 55 77 kg/m² ,   Orthostatic Blood Pressures      Most Recent Value   Blood Pressure  117/55 filed at 04/20/2021 7180   Patient Position - Orthostatic VS  Lying filed at 04/20/2021 3790      ,      Intake/Output Summary (Last 24 hours) at 4/20/2021 1028  Last data filed at 4/20/2021 6009  Gross per 24 hour   Intake 1290 ml   Output 2625 ml   Net -1335 ml           Physical Exam:    GEN: Lisa Hale appears well, alert and oriented x 3, pleasant and cooperative   NECK: supple, no carotid bruits, no JVD or HJR  HEART: normal rate, regular rhythm, normal S1 and S2, no murmurs, clicks, gallops or rubs   LUNGS: clear to auscultation bilaterally; no wheezes, rales, or rhonchi   EXTREMITIES: peripheral pulses normal; no clubbing, cyanosis, or edema  SKIN: warm and well perfused, no suspicious lesions on exposed skin    Labs & Results:    No results displayed because visit has over 200 results            Cta Chest Wo W Contrast    Result Date: 4/7/2021  Narrative: CTA - CHEST WITH IV CONTRAST - PULMONARY ANGIOGRAM INDICATION: "r/o PE "  Postop excision of spinal mass on 4/6/2021  Difficult intubation and hypoxia throughout most of the case  History of coronary artery disease  COMPARISON: Chest radiograph from 4/6/2021 and abdomen CT from 2/22/2021  TECHNIQUE: CT angiogram timed for optimal opacification of the pulmonary arteries  Axial, sagittal, and coronal 2D reformats created from source data  Coronal 3D MIP postprocessing on the acquisition scanner  Radiation dose length product (DLP):  1594 69 mGy-cm   Radiation dose exposure minimized using iterative reconstruction and automated exposure control  IV Contrast:  100 mL of iohexol (OMNIPAQUE)  FINDINGS: PULMONARY ARTERIES:  With moderate compromised by respiratory motion producing artifactual filling defects in the vessels of the upper and mid lungs, no definite acute pulmonary embolus  LUNGS:  Evaluation mildly compromised by respiratory motion  Complete left lower lobe and partial right lower lobe atelectasis  Partial atelectasis of the right middle lobe  Triangular nonenhancing area of juxtapleural ground glass opacity in the posterior basal right lower lobe (2/130 - 153; 601/139)  AIRWAYS: No significant filling defects  PLEURA:  Unremarkable  HEART/GREAT VESSELS:  Mild cardiomegaly  Mild coronary artery calcification indicating atherosclerotic heart disease  MEDIASTINUM AND SLAVA:  ET tube 5 cm above the nicolasa  CHEST WALL AND LOWER NECK: 9 mm left thyroid nodule (2/18)  No follow-up is needed  UPPER ABDOMEN:  NG tube in stomach  OSSEOUS STRUCTURES:  Mild degenerative disease in the spine  Impression: With moderate compromise by respiratory motion, particularly in the upper lungs, no definite acute pulmonary embolus  Nonenhancing peripheral wedge-shaped area of groundglass opacity in the right lower lobe which has the appearance of a pulmonary infarct   Complete left lower lobe atelectasis and partial right lower lobe and right middle lobe atelectasis  Mild coronary artery calcification compatible with known coronary artery disease  I personally discussed this study with Hortensia Leger on 4/7/2021 at 8:38 AM  Workstation performed: [id number]     Stat Cxr Icu    Result Date: 4/6/2021  Narrative: CHEST INDICATION:   ETT positioning  Patient has suspected COVID-19  COMPARISON:  11/29/2020 EXAM PERFORMED/VIEWS:  XR CHEST PORTABLE ICU FINDINGS:  Endotracheal tube is present, in satisfactory position with its tip above the level of the nicolasa  Enteric tube is present with its tip extending below the left hemidiaphragm  Cardiomediastinal silhouette appears unremarkable  Patchy densities at the right lung base noted  Osseous structures appear within normal limits for patient age  Impression: 1  Interval intubation  2   Right basilar opacities possibly atelectasis versus pneumonia or aspiration  Workstation performed: [id number]     Vas Lower Limb Venous Duplex Study, Complete Bilateral    Result Date: 4/7/2021  Narrative:  [facility] REPORT CLINICAL: Indications: Patient presents with left lower extremity pain s/p left thigh mass excision and recent pulmonary infarct  Patient is currently receiving Heparin  Operative History: Coronary angioplasty with stent placement Risk Factors The patient has history of Obesity, HTN, Diabetes, HLD, CAD, MI and smoking (current) 1-2 ppd  FINDINGS:  Segment       Right            Left                        Impression       Impression       CFV           Normal (Patent)  Normal (Patent)  GSV Inguinal  Normal (Patent)  Normal (Patent)     CONCLUSION:  Impression:  RIGHT LOWER LIMB: No evidence of acute or chronic deep vein thrombosis  No evidence of superficial thrombophlebitis noted  Doppler evaluation shows a normal response to augmentation maneuvers  Popliteal, posterior tibial and anterior tibial arterial Doppler waveforms are triphasic    LEFT LOWER LIMB: No evidence of acute or chronic deep vein thrombosis  No evidence of superficial thrombophlebitis noted  Doppler evaluation shows a normal response to augmentation maneuvers  Popliteal, posterior tibial and anterior tibial arterial Doppler waveforms are triphasic  Technical findings were faxed to chart  SIGNATURE: Electronically Signed by: Guillermo Yu on 2021-04-07 05:32:49 PM    Us Bedside Procedure    Result Date: 4/6/2021  Narrative: 1 2 840 [id number] 2 323 [id number] 3    Us Bedside Procedure    Result Date: 4/6/2021  Narrative: 1 2 840 [id number] 2 323 [id number] 2      EKG personally reviewed by Vik Leary MD      Counseling / Coordination of Care  Total floor / unit time spent today 30 minutes  Greater than 50% of total time was spent with the patient and / or family counseling and / or coordination of care

## 2021-04-20 NOTE — OCCUPATIONAL THERAPY NOTE
Occupational Therapy Re-evaluation     Patient Name: Julia Bojorquez  EAXJI'B Date: 4/20/2021  Problem List  Principal Problem:    Left groin mass  Active Problems:    CAD (coronary artery disease)    Class 3 severe obesity due to excess calories with serious comorbidity and body mass index (BMI) of 50 0 to 59 9 in adult [facility])    Past Medical History  Past Medical History:   Diagnosis Date    Coronary artery disease     Diabetes mellitus ([facility]     Heart disease     CAD   s/p ptca with 1 stent 2012    Hidradenitis suppurativa     groin    Hyperlipidemia     Hypertension     MI (myocardial infarction) ([facility] )     " silent " M I  in the past    Morbid obesity with BMI of 50 0-59 9, [facility]     Myocardial infarction ([facility] )     Nicotine dependence     Obesity     Pilonidal cyst      Past Surgical History  Past Surgical History:   Procedure Laterality Date    CORONARY ANGIOPLASTY WITH STENT PLACEMENT      INCISION AND DRAINAGE OF WOUND N/A 1/24/2017    Procedure: INCISION AND DRAINAGE (I&D) BUTTOCK, PILONIDAL CYST;  Surgeon: Lori Rudolph MD;  Location: [address] [place] [address];  Service:    [facility] LEG SURGERY Left     I&D of left leg 2012    [place] EXC SKIN BENIG >4 CM TRUNK,ARM,LEG Left 4/6/2021    Procedure: EXCISION THIGH MASS;  Surgeon: Fletcher Mendez MD;  Location: [facility];  Service: General    [place] NEG PRESS WOUND TX, < 50 CM Left 4/6/2021    Procedure: APPLICATION VAC DRESSING THIGH;  Surgeon: Fletcher Mendez MD;  [facility];  Service: General    WOUND DEBRIDEMENT Left 4/17/2021    Procedure: DEBRIDEMENT WOUND AND DRESSING CHANGE ([facility]);   Surgeon: Nicolasa Richard DO;  Location: [facility];  Service: General           04/20/21 1320   OT Last Visit   OT Visit Date 04/20/21   Note Type   Note type Re-Evaluation   Restrictions/Precautions   Weight Bearing Precautions Per Order No   Other Precautions Pain  (Wound VAC)   Pain Assessment   Pain Assessment Tool 0-10   Pain Score 8   Pain Location/Orientation Location: Incision;Orientation: Left; Location: Leg   Effect of Pain on Daily Activities   (Pain limits participation in meaningful daily activities)   Patient's Stated Pain Goal No pain   Hospital Pain Intervention(s) Emotional support;Rest;Ambulation/increased activity   Home Living   Additional Comments Please refer to initial OT evaluation for home set-up  Pt reports first floor is being set-up by son and roomate in case he needs to stay there at first    Prior Function   Comments Please refer to initial OT evaluation for PLOF  Lifestyle   Autonomy Pt reports being IND w/ all ADLS and IADLS; (+) drives PTA   Reciprocal Relationships Pt lives w/ his 25yo son  Pt reports his son is currently home-schooled and can A as needed  Pt also lives w/ roommate, however reports "he is useless and is a terrible roommate "   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  Intrinsic Gratification Pt reports enjoying fishing and playing [employer]  Psychosocial   Psychosocial (WDL) WDL   Subjective   Subjective "I tend to think I'll need less help than I probably will need"   ADL   Where Assessed Supine, bed   Equipment Provided Sock aid;Reacher   Eating Assistance 7  [address] 5  Supervision/Setup   LB Bathing Assistance 5  Supervision/Setup   [address] 5  Supervision/Setup   LB Dressing Assistance 5  [address]  5  [phone] [facility] 5  Supervision/Setup   Additional Comments Pt in bed upon OT arrival  Pt performed ADL tasks in bed 2' pain in L LE site  Pt left in bed with all needed items within reach  No concerns about returning home besides experiencing pain  Bed Mobility   Additional Comments Pt declined at this time 2' to pain  Transfers   Additional Comments Pt declined at this time 2' to pain     Activity Tolerance   Activity Tolerance Patient limited by pain   Medical Staff Made Aware OT Lilli   Nurse Made Aware RN cleared pt for OT session   RUE Assessment   RUE Assessment WFL   LUE Assessment   LUE Assessment WFL   Hand Function   Gross Motor Coordination Functional   Fine Motor Coordination Functional   Cognition   Overall Cognitive Status WFL   Arousal/Participation Alert; Responsive; Cooperative   Attention Within functional limits   Orientation Level Oriented X4   Memory Within functional limits   Following Commands Follows all commands and directions without difficulty   Comments Pt cooperative t/o OT session  Pt expressed that he had been thinking ahead on his needs for returning home  Pt openly discussed compensatory techniques/adaptive equipment/DME w/ therapist and demonstrated good safety awareness and insight to his current functional deficits  Assessment   Limitation Decreased ADL status; Decreased endurance;Decreased high-level ADLs   Prognosis Good   Assessment Pt is a 45 y o  male seen for OT re-evaluation s/p goal expiration on 4/20/2021 after being admitted to [facility] on 4/6/2021 w/ L groin mass for excision of L thigh mass  Pt has a past medical history of Coronary artery disease, Diabetes mellitus ([facility] [address] ), Heart disease, Hidradenitis suppurativa, Hyperlipidemia, Hypertension, MI (myocardial infarction) ([facility] ), Morbid obesity with BMI of 50 0-59 9, adult ([facility] ), Myocardial infarction ([facility] ), Nicotine dependence, Obesity, and Pilonidal cyst   Pt with active OT orders and activity as tolerated orders  Please refer to initial OT evaluation note for home living set-up and PLOF  Currently pt is completing all ADL tasks w/ S/set-up assistance  Pt is limited at this time 2' pain, endurance and strength  The following occupational performance areas to address include: ADL retraining, functional transfer training, endurance training, patient/family training, equipment evaluation/education, compensatory technique education, energy conservation, and activity engagement   Pt participated in compensatory technique education and adaptive equipment/DME education  Pt trialed and IND used a reacher and sock aid  The patient's raw score on the AM-PAC Daily Activity inpatient short form is 21, standardized score is 44 27, greater than 39 4  Patients at this level are likely to benefit from discharge to home  Please refer to the recommendation of the Occupational Therapist for safe discharge planning  From OT standpoint, anticipate d/c home with home OT and increased social support as needed  Pt to continue to benefit from immediate acute care OT services to increase occupational participation in meaningful daily activities  Goals   Patient Goals "To go home"   LTG Time Frame 3-7   Long Term Goal #1 Please refer to goals on intial OT note  Plan   Treatment Interventions ADL retraining;Functional transfer training; Endurance training;Patient/family training;Equipment evaluation/education; Compensatory technique education; Energy conservation; Activityengagement   Goal Expiration Date 04/27/21   OT Treatment Day 1   OT Frequency 1-2x/wk   Additional Treatment Session   Start Time 1194   End Time 6912   Treatment Assessment Pt participated in OT treatment session on 4/20/2021  Pt expressed plans to d/c home in 24-48 hours  Pt w/ concerns regarding participation in meaningful daily activities 2' to pain  OT provided education on current functional status, DME recommendations, compensatory techniques, adaptive equipment, energy conservation, safety awareness, and social support available upon d/c home  Pt trialed use of reacher and sock aide for LB dressing and demonstrated Good carryover and IND in use  OT educated pt on home OT services and he agreed to services  OT confirmed 25 y o son lives at home and friends are available to assist as needed upon d/c  Pt reporting has been functioning at IND level for functional mobility and safe transfers in room during hospital stay   Pt functioning close to baseline, no further acute care OT needs indicated  Please re-consult as appropriate  Pt will benefit from continued participation in meaningful daily activities w/ hospital staff t/o stay      Recommendation   OT Discharge Recommendation Home with home health rehabilitation  (Home OT services)   Equipment Recommended Bedside commode;Reacher ($);Sock aid ($);Shower/Tub chair with back ($)   Commode Type Bariatric (300 lb+)   OT - OK to Discharge Yes  (When medically cleared)   AM-PAC Daily Activity Inpatient   Lower Body Dressing 3   Bathing 3   Toileting 3   Upper Body Dressing 4   Grooming 4   Eating 4   Daily Activity Raw Score 21   Daily Activity Standardized Score (Calc for Raw Score >=11) 44 27   AM-PAC Applied Cognition Inpatient   Following a Speech/Presentation 4   Understanding Ordinary Conversation 4   Taking Medications 4   Remembering Where Things Are Placed or Put Away 4   Remembering List of 4-5 Errands 4   Taking Care of Complicated Tasks 4   Applied Cognition Raw Score 24   Applied Cognition Standardized Score 62 21   Modified Tilly Scale   Modified Eulogio Scale 2          Justine Cherry, CHASE

## 2021-04-20 NOTE — PROGRESS NOTES
Progress Note - Acute Pain Service    Luetta Cheadle 45 y o  male MRN: [id number]  Unit/Bed#: [facility] 512-01 Encounter: [id number]      Assessment:   Principal Problem:    Left groin mass  Active Problems:    CAD (coronary artery disease)    Class 3 severe obesity due to excess calories with serious comorbidity and body mass index (BMI) of 50 0 to 59 9 in [facility])    Luetta Cheadle is a 45 y o  male  Admitted on 4/6/21 for mass excision of hidradenitis in the left proximal thigh with VAC dressing placement   Patient's pain has been difficult to control, therefore APS consulted  Plan:    Continue Tylenol 975 mg p o  q 8 hours scheduled   Continue Dilaudid 4 mg p o  q 4 hours p r n  moderate pain   Continue Dilaudid 6 mg p o  q 4 hours p r n  severe pain   Discontinue IV Dilaudid   Continue Neurontin 200 mg p o  t i d  scheduled   Continue Robaxin 750 mg p o  q 6 hours scheduled   At discharge, suggest continue Tylenol, Neurontin, Robaxin as currently ordered   At discharge, suggest Dilaudid 4 mg p o  Q 4-6 hours as needed for moderate to severe pain times 5-7 days or until follow-up  APS will continue to follow  Please call  / [phone] or [facility] (/ between [phone] and on weekends) with questions or concerns    Pain History  Current pain location(s):   Left leg  Pain Scale:    9/10  Quality:  burn  24 hour history:  Patient using less IV opioids over past 24 hours in anticipation of discharge in the next 24-48 hours  Opioid requirement previous 24 hours:   Dilaudid 30 mg p o , Dilaudid 4 mg IV      Meds/Allergies   all current active meds have been reviewed, current meds:   Current Facility-Administered Medications   Medication Dose Route Frequency    acetaminophen (TYLENOL) tablet 975 mg  975 mg Oral Q8H [address]    aspirin chewable tablet 81 mg  81 mg Oral Daily    atorvastatin (LIPITOR) tablet 80 mg  80 mg Oral Daily With Scope 5 bisacodyl (DULCOLAX) EC tablet 5 mg  5 mg Oral Daily PRN    carvedilol (COREG) tablet 6 25 mg  6 25 mg Oral BID With Meals    chlorhexidine (PERIDEX) 0 12 % oral rinse 15 mL  15 mL Swish & Spit Q12H [address]    enoxaparin (LOVENOX) subcutaneous injection 60 mg  60 mg Subcutaneous Q12H [address]    gabapentin (NEURONTIN) capsule 200 mg  200 mg Oral TID    HYDROmorphone (DILAUDID) injection 1 mg  1 mg Intravenous Q4H PRN    HYDROmorphone (DILAUDID) tablet 4 mg  4 mg Oral Q4H PRN    Or    HYDROmorphone (DILAUDID) tablet 6 mg  6 mg Oral Q4H PRN    insulin lispro (HumaLOG) 100 units/mL subcutaneous injection 4-20 Units  4-20 Units Subcutaneous 4x Daily (AC & HS)    methocarbamol (ROBAXIN) tablet 750 mg  750 mg Oral Q6H [address]    naloxone (NARCAN) injection 0 1 mg  0 1 mg Intravenous Q3 min PRN    polyethylene glycol (MIRALAX) packet 17 g  17 g Oral Daily    senna-docusate sodium (SENOKOT S) 8 6-50 mg per tablet 1 tablet  1 tablet Oral HS    sodium chloride (OCEAN) 0 65 % nasal spray 1 spray  1 spray Each Nare Q1H PRN    ticagrelor (BRILINTA) tablet 90 mg  90 mg Oral Q12H [address]    and PTA meds:   Prior to Admission Medications   Prescriptions Last Dose Informant Patient Reported? Taking?    Omega-3 Fatty Acids (fish oil) 1,000 mg 4/5/2021 at 0500  No Yes   Sig: Take 1 capsule (1,000 mg total) by mouth 2 (two) times a day   Ticagrelor (BRILINTA PO) 4/5/2021 at 0500  Yes Yes   Sig: Take by mouth   amLODIPine (NORVASC) 10 mg tablet 4/5/2021 at 0500  No Yes   Sig: take 1 tablet by mouth once daily   aspirin (ECOTRIN LOW STRENGTH) 81 mg EC tablet 4/1/2021  No Yes   Sig: Take 1 tablet (81 mg total) by mouth daily   atorvastatin (LIPITOR) 80 mg tablet 4/4/2021 at 1900  No Yes   Sig: Take 1 tablet (80 mg total) by mouth daily   carvedilol (COREG) 6 25 mg tablet 4/5/2021 at 0500  No Yes   Sig: Take 1 tablet (6 25 mg total) by mouth 2 (two) times a day with meals   clindamycin (CLEOCIN) 150 mg capsule 4/5/2021 at 1700  No Yes   Sig: Take 1 capsule (150 mg total) by mouth every 12 (twelve) hours for 6 days   glimepiride (AMARYL) 4 mg tablet 4/5/2021 at 0500  No Yes   Sig: Take 1 tablet (4 mg total) by mouth daily with breakfast   lisinopril (ZESTRIL) 10 mg tablet 4/5/2021 at 0500  No Yes   Sig: Take 1 tablet (10 mg total) by mouth daily   metFORMIN (GLUCOPHAGE) 1000 MG tablet 4/5/2021 at 0500  No Yes   Sig: Take 1 tablet (1,000 mg total) by mouth 2 (two) times a day with meals      Facility-Administered Medications: None       Allergies   Allergen Reactions    Amoxicillin Hives       Objective     Temp:  [98 5 °F (36 9 °C)-99 °F (37 2 °C)] 98 5 °F (36 9 °C)  HR:  [54-60] 60  Resp:  [19-20] 20  BP: (112-119)/(52-64) 117/55    Physical Exam  Vitals signs and nursing note reviewed  Constitutional:       General: He is awake  He is not in acute distress  Skin:     General: Skin is warm and dry  Neurological:      Mental Status: He is alert and oriented to person, place, and time  GCS: GCS eye subscore is 4  GCS verbal subscore is 5  GCS motor subscore is 6  Psychiatric:         Behavior: Behavior is cooperative  Lab Results:   Results from last 7 days   Lab Units 04/20/21  0557   WBC Thousand/uL 11 68*   HEMOGLOBIN g/dL 9 9*   HEMATOCRIT % 32 4*   PLATELETS Thousands/uL 578*      Results from last 7 days   Lab Units 04/20/21  0557   POTASSIUM mmol/L 4 1   CHLORIDE mmol/L 104   CO2 mmol/L 30   BUN mg/dL 14   CREATININE mg/dL 0 94   CALCIUM mg/dL 8 9       Imaging Studies: I have personally reviewed pertinent reports  EKG, Pathology, and Other Studies: I have personally reviewed pertinent reports  Counseling / Coordination of Care  Total floor / unit time spent today 30 minutes  Greater than 50% of total time was spent with the patient and / or family counseling and / or coordination of care   A description of the counseling / coordination of care:  Patient interview, physical examination, review of medical record, review of imaging and laboratory data, development of pain management plan, discussion of pain management plan with patient and primary service  Please note that the APS provides consultative services regarding pain management only  With the exception of ketamine and epidural infusions and except when indicated, final decisions regarding starting or changing doses of analgesic medications are at the discretion of the consulting service  Off hours consultation and/or medication management is generally not available      Wolfgang Amezcua PA-C  Acute Pain Service

## 2021-04-20 NOTE — PROGRESS NOTES
Progress Note - General Surgery   Julia Bojorquez 45 y o  male MRN: [id number]  Unit/Bed#: [facility] 512-01 Encounter: [id number]    Assessment:  45 y o  M with L thigh soft tissue mass s/p excision and vac placement 4/6  S/p L thigh washout and vac placement 4/17    Afebrile  VSS  575 cc serosanguinous drainage out of wound vac    Plan:  Continue diabetic diet  Trend WBC/fever curve  Tuesday/Friday Vac changes - will change vac bedside today and evaluate if the wound is acceptable for [facility] placement   APS following for pain control - have transitioned him to oral regimen   DVT ppx  OOB/Ambulate  CM for dispo planning - anticipate d/c home in the next 24-48 hours     Subjective/Objective     Subjective: No acute events overnight  States his pain is better controlled on the current regimen  +flatulence/BM  Tolerating diet without nausea/vomiting  No fevers, chills  Objective:     Blood pressure 112/52, pulse (!) 54, temperature 99 °F (37 2 °C), temperature source Oral, resp  rate 20, height 5' 6 75" (1 695 m), weight (!) 160 kg (353 lb 6 4 oz), SpO2 90 %  ,Body mass index is 55 77 kg/m²  Intake/Output Summary (Last 24 hours) at 4/20/2021 0548  Last data filed at 4/20/2021 3332  Gross per 24 hour   Intake 900 ml   Output 3325 ml   Net -2425 ml       Invasive Devices     Peripheral Intravenous Line            Peripheral IV 04/16/21 Proximal;Right;Ventral (anterior) Forearm 4 days          Drain            Negative Pressure Wound Therapy (V A C ) Other (Comment) 13 days                Physical Exam:   NAD, alert and oriented x3  Normocephalic, atraumatic  MMM, EOMI, PERRLA  Norm resp effort on RA  RRR  Abd soft, NT/ND  L groin wound vac in place with serosanguinous drainage in canister     No calf tenderness or peripheral edema  Motor/sensation intact in distal extremities  CN grossly intact  -rash/lesions      Lab, Imaging and other studies:  CBC:   No results found for: WBC, HGB, HCT, MCV, PLT, ADJUSTEDWBC, MCH, MCHC, RDW, MPV, NRBC, CMP:   No results found for: SODIUM, K, CL, CO2, ANIONGAP, BUN, CREATININE, GLUCOSE, CALCIUM, AST, ALT, ALKPHOS, PROT, BILITOT, EGFR  VTE Pharmacologic Prophylaxis: Enoxaparin (Lovenox)  VTE Mechanical Prophylaxis: sequential compression device

## 2021-04-20 NOTE — PLAN OF CARE
Problem: OCCUPATIONAL THERAPY ADULT  Goal: Performs self-care activities at highest level of function for planned discharge setting  See evaluation for individualized goals  Description: Treatment Interventions: ADL retraining, Functional transfer training, UE strengthening/ROM, Endurance training, Cognitive reorientation, Patient/family training, Equipment evaluation/education, Compensatory technique education, Energy conservation, Activityengagement          See flowsheet documentation for full assessment, interventions and recommendations  Outcome: Adequate for Discharge  Note: Limitation: Decreased ADL status, Decreased endurance, Decreased high-level ADLs  Prognosis: Good  Assessment: Pt is a 45 y o  male seen for OT re-evaluation s/p goal expiration on 4/20/2021 after being admitted to [facility] on 4/6/2021 w/ L groin mass for excision of L thigh mass  Pt has a past medical history of Coronary artery disease, Diabetes mellitus ([facility] [address] ), Heart disease, Hidradenitis suppurativa, Hyperlipidemia, Hypertension, MI (myocardial infarction) ([facility] ), Morbid obesity with BMI of 50 0-59 9, adult ([facility] ), [facility] ), Nicotine dependence, Obesity, and Pilonidal cyst   Pt with active OT orders and activity as tolerated orders  Please refer to initial OT evaluation note for home living set-up and PLOF  Currently pt is completing all ADL tasks w/ S/set-up assistance  Pt is limited at this time 2' pain, endurance and strength  The following occupational performance areas to address include: ADL retraining, functional transfer training, endurance training, patient/family training, equipment evaluation/education, compensatory technique education, energy conservation, and activity engagement  Pt participated in compensatory technique education and adaptive equipment/DME education  Pt trialed and IND used a reacher and sock aid  The patient's raw score on the AM-PAC Daily Activity inpatient short form is 21, standardized score is 44 27, greater than 39 4  Patients at this level are likely to benefit from discharge to home  Please refer to the recommendation of the Occupational Therapist for safe discharge planning  From OT standpoint, anticipate d/c home with home OT and increased social support as needed  Pt to continue to benefit from immediate acute care OT services to increase occupational participation in meaningful daily activities       OT Discharge Recommendation: (S) Home with home health rehabilitation(Home OT services)  OT - OK to Discharge: Yes(When medically cleared)

## 2021-04-20 NOTE — QUICK NOTE
Nurse-Patient-Provider rounds were completed with the patient's nurse today, Gerardo Palomino  We discussed the plan is to continue regular diet with Ensure supplements twice daily  Continue local wound care with VAC dressing; VAC dressing change completed today as noted below  Plan to proceed to the OR on Thursday, 04/22/2021 for wound re-evaluation, anticipated split-thickness skin graft application and VAC dressing placement  We reviewed all of the invasive devices/lines/telemetry orders   - None  DVT Prophylaxis:  - Lovenox and SCDs    Pain Assessment / Plan:  - Continue current analgesic regimen  Mobility Assessment / Plan:  - Activity as tolerated  Goals / Barriers for discharge:  - Not yet appropriate for discharge while awaiting additional operative intervention as noted above  - Case management following; case and discharge needs discussed  All questions and concerns were addressed  I spent greater than 38 minutes reviewing the plan with the patient and the nurse, and coordinating care for the day  Acute Care Surgery  Bedside V A C  Procedure Note    A timeout was performed with the patient's nurse, Blossom, prior to beginning the dressing change  The nurse remained present to confirm the correct dressing counts on removal of the VAC dressing and was debriefed at the completion of the dressing change  Location of wound: Left groin / thigh    Dressings and Foam removed:  2 oil emulsion gauze (Adaptic) dressing, 1 white foam and 1 black foam dressing    Dimensions of wound: 32 cm x 12 cm x 2 cm    Description of wound: On inspection of the wound today, the wound bed was clean, beefy red and >90% granulating  The was no foul odor, purulent drainage, and/or necrotic/nonviable tissue  The periwound skin remains clean and intact and unremarkable  VAC dressing application:  The periwound skin was cleaned and dried    One piece of black foam dressing was cut to size of the wound and placed into the wound  The dressings were then covered with VAC drape  The track pad was then placed over the base of black foam  The VAC was then set to 125 mmHg low Continuous suction  The patient tolerated the procedure well and there were no complications  The patient did not require any excisional debridement during today's dressing change  VAC settings:  125 mmHg  Continuous    Wound Images: Additional Notes: The [facility] sticker placed over the dressing per protocol  The next [facility] dressing change will be planned for Thursday, 4/22/2021 in OR with anticipated skin graft application at that time       Dr Garrett Craven and the surgical residents (Dr Kathrine Jennings) were present for the dressing change  This dressing change took greater than 30 minutes to complete      Shun Viera PA-C  4/20/2021 03:29 PM

## 2021-04-20 NOTE — CASE MANAGEMENT
VAC paperwork was placed in patient's chart  Surgery to evaluate wound today and will inform CM if patient needs wound VAC at [place]  Patient is accepted by [facility] care who will follow the patient at home for nursing, PT and OT

## 2021-04-21 ENCOUNTER — ANESTHESIA EVENT (INPATIENT)
Dept: PERIOP | Facility: HOSPITAL | Age: 41
DRG: 570 | End: 2021-04-21
Payer: COMMERCIAL

## 2021-04-21 LAB
BASOPHILS # BLD AUTO: 0.05 THOUSANDS/ΜL (ref 0–0.1)
BASOPHILS NFR BLD AUTO: 0 % (ref 0–1)
EOSINOPHIL # BLD AUTO: 0.44 THOUSAND/ΜL (ref 0–0.61)
EOSINOPHIL NFR BLD AUTO: 4 % (ref 0–6)
ERYTHROCYTE [DISTWIDTH] IN BLOOD BY AUTOMATED COUNT: 13.5 % (ref 11.6–15.1)
GLUCOSE SERPL-MCNC: 133 MG/DL (ref 65–140)
GLUCOSE SERPL-MCNC: 149 MG/DL (ref 65–140)
GLUCOSE SERPL-MCNC: 171 MG/DL (ref 65–140)
GLUCOSE SERPL-MCNC: 208 MG/DL (ref 65–140)
HCT VFR BLD AUTO: 31.7 % (ref 36.5–49.3)
HGB BLD-MCNC: 9.7 G/DL (ref 12–17)
IMM GRANULOCYTES # BLD AUTO: 0.13 THOUSAND/UL (ref 0–0.2)
IMM GRANULOCYTES NFR BLD AUTO: 1 % (ref 0–2)
LYMPHOCYTES # BLD AUTO: 2.08 THOUSANDS/ΜL (ref 0.6–4.47)
LYMPHOCYTES NFR BLD AUTO: 18 % (ref 14–44)
MCH RBC QN AUTO: 27.3 PG (ref 26.8–34.3)
MCHC RBC AUTO-ENTMCNC: 30.6 G/DL (ref 31.4–37.4)
MCV RBC AUTO: 89 FL (ref 82–98)
MONOCYTES # BLD AUTO: 0.84 THOUSAND/ΜL (ref 0.17–1.22)
MONOCYTES NFR BLD AUTO: 7 % (ref 4–12)
NEUTROPHILS # BLD AUTO: 7.75 THOUSANDS/ΜL (ref 1.85–7.62)
NEUTS SEG NFR BLD AUTO: 70 % (ref 43–75)
NRBC BLD AUTO-RTO: 0 /100 WBCS
PLATELET # BLD AUTO: 517 THOUSANDS/UL (ref 149–390)
PMV BLD AUTO: 8.8 FL (ref 8.9–12.7)
RBC # BLD AUTO: 3.55 MILLION/UL (ref 3.88–5.62)
WBC # BLD AUTO: 11.29 THOUSAND/UL (ref 4.31–10.16)

## 2021-04-21 PROCEDURE — 99024 POSTOP FOLLOW-UP VISIT: CPT | Performed by: SURGERY

## 2021-04-21 PROCEDURE — 85025 COMPLETE CBC W/AUTO DIFF WBC: CPT | Performed by: SURGERY

## 2021-04-21 PROCEDURE — 82948 REAGENT STRIP/BLOOD GLUCOSE: CPT

## 2021-04-21 PROCEDURE — 99232 SBSQ HOSP IP/OBS MODERATE 35: CPT | Performed by: INTERNAL MEDICINE

## 2021-04-21 RX ORDER — GLYCOPYRROLATE 0.2 MG/ML
0.2 INJECTION INTRAMUSCULAR; INTRAVENOUS EVERY 4 HOURS PRN
Status: DISCONTINUED | OUTPATIENT
Start: 2021-04-21 | End: 2021-04-29 | Stop reason: HOSPADM

## 2021-04-21 RX ORDER — LORAZEPAM 2 MG/ML
1 INJECTION INTRAMUSCULAR
Status: ACTIVE | OUTPATIENT
Start: 2021-04-21 | End: 2021-04-23

## 2021-04-21 RX ORDER — HALOPERIDOL 5 MG/ML
2 INJECTION INTRAMUSCULAR
Status: DISCONTINUED | OUTPATIENT
Start: 2021-04-21 | End: 2021-04-23

## 2021-04-21 RX ADMIN — ACETAMINOPHEN 975 MG: 325 TABLET ORAL at 13:00

## 2021-04-21 RX ADMIN — ACETAMINOPHEN 975 MG: 325 TABLET ORAL at 05:43

## 2021-04-21 RX ADMIN — HYDROMORPHONE HYDROCHLORIDE 1 MG: 1 INJECTION, SOLUTION INTRAMUSCULAR; INTRAVENOUS; SUBCUTANEOUS at 00:40

## 2021-04-21 RX ADMIN — ASPIRIN 81 MG: 81 TABLET, CHEWABLE ORAL at 08:31

## 2021-04-21 RX ADMIN — METHOCARBAMOL TABLETS 750 MG: 750 TABLET, COATED ORAL at 12:55

## 2021-04-21 RX ADMIN — METHOCARBAMOL TABLETS 750 MG: 750 TABLET, COATED ORAL at 05:43

## 2021-04-21 RX ADMIN — HYDROMORPHONE HYDROCHLORIDE 6 MG: 2 TABLET ORAL at 18:01

## 2021-04-21 RX ADMIN — POLYETHYLENE GLYCOL 3350 17 G: 17 POWDER, FOR SOLUTION ORAL at 08:31

## 2021-04-21 RX ADMIN — INSULIN LISPRO 4 UNITS: 100 INJECTION, SOLUTION INTRAVENOUS; SUBCUTANEOUS at 10:35

## 2021-04-21 RX ADMIN — METHOCARBAMOL TABLETS 750 MG: 750 TABLET, COATED ORAL at 00:40

## 2021-04-21 RX ADMIN — HYDROMORPHONE HYDROCHLORIDE 6 MG: 2 TABLET ORAL at 08:38

## 2021-04-21 RX ADMIN — KETAMINE HYDROCHLORIDE 0.1 MG/KG/HR: 50 INJECTION, SOLUTION INTRAMUSCULAR; INTRAVENOUS at 16:12

## 2021-04-21 RX ADMIN — CHLORHEXIDINE GLUCONATE 0.12% ORAL RINSE 15 ML: 1.2 LIQUID ORAL at 20:07

## 2021-04-21 RX ADMIN — ACETAMINOPHEN 975 MG: 325 TABLET ORAL at 22:19

## 2021-04-21 RX ADMIN — INSULIN LISPRO 4 UNITS: 100 INJECTION, SOLUTION INTRAVENOUS; SUBCUTANEOUS at 22:21

## 2021-04-21 RX ADMIN — HYDROMORPHONE HYDROCHLORIDE 1 MG: 1 INJECTION, SOLUTION INTRAMUSCULAR; INTRAVENOUS; SUBCUTANEOUS at 20:07

## 2021-04-21 RX ADMIN — HYDROMORPHONE HYDROCHLORIDE 6 MG: 2 TABLET ORAL at 22:25

## 2021-04-21 RX ADMIN — ATORVASTATIN CALCIUM 80 MG: 80 TABLET, FILM COATED ORAL at 16:11

## 2021-04-21 RX ADMIN — ENOXAPARIN SODIUM 60 MG: 60 INJECTION SUBCUTANEOUS at 08:32

## 2021-04-21 RX ADMIN — HYDROMORPHONE HYDROCHLORIDE 6 MG: 2 TABLET ORAL at 02:52

## 2021-04-21 RX ADMIN — CARVEDILOL 6.25 MG: 6.25 TABLET, FILM COATED ORAL at 16:11

## 2021-04-21 RX ADMIN — CHLORHEXIDINE GLUCONATE 0.12% ORAL RINSE 15 ML: 1.2 LIQUID ORAL at 08:31

## 2021-04-21 RX ADMIN — GABAPENTIN 200 MG: 100 CAPSULE ORAL at 20:06

## 2021-04-21 RX ADMIN — GABAPENTIN 200 MG: 100 CAPSULE ORAL at 08:31

## 2021-04-21 RX ADMIN — METHOCARBAMOL TABLETS 750 MG: 750 TABLET, COATED ORAL at 17:13

## 2021-04-21 RX ADMIN — TICAGRELOR 90 MG: 90 TABLET ORAL at 08:31

## 2021-04-21 RX ADMIN — HYDROMORPHONE HYDROCHLORIDE 1 MG: 1 INJECTION, SOLUTION INTRAMUSCULAR; INTRAVENOUS; SUBCUTANEOUS at 10:34

## 2021-04-21 RX ADMIN — GABAPENTIN 200 MG: 100 CAPSULE ORAL at 16:11

## 2021-04-21 RX ADMIN — CARVEDILOL 6.25 MG: 6.25 TABLET, FILM COATED ORAL at 08:31

## 2021-04-21 RX ADMIN — TICAGRELOR 90 MG: 90 TABLET ORAL at 20:07

## 2021-04-21 RX ADMIN — HYDROMORPHONE HYDROCHLORIDE 1 MG: 1 INJECTION, SOLUTION INTRAMUSCULAR; INTRAVENOUS; SUBCUTANEOUS at 14:56

## 2021-04-21 RX ADMIN — DOCUSATE SODIUM AND SENNOSIDES 1 TABLET: 8.6; 5 TABLET ORAL at 22:20

## 2021-04-21 RX ADMIN — ENOXAPARIN SODIUM 60 MG: 60 INJECTION SUBCUTANEOUS at 20:06

## 2021-04-21 RX ADMIN — HYDROMORPHONE HYDROCHLORIDE 1 MG: 1 INJECTION, SOLUTION INTRAMUSCULAR; INTRAVENOUS; SUBCUTANEOUS at 05:43

## 2021-04-21 RX ADMIN — HYDROMORPHONE HYDROCHLORIDE 6 MG: 2 TABLET ORAL at 12:55

## 2021-04-21 NOTE — PROGRESS NOTES
Progress Note - General Surgery   Carlos Mendoza 45 y o  male MRN: [id number]  Unit/Bed#: [facility] 512-01 Encounter: [id number]    Assessment:  45 y o  M with L thigh soft tissue mass s/p excision and vac placement 4/6  S/p L thigh washout and vac placement 4/17     Afebrile  VSS  Wound vac changed on 4/20 with 500 cc serosanguinous drainage out of wound vac o/n         Plan:  - diabetic diet  - STSG 4/22 for wound closure  - trend wbc/fever   - APS for pain  - dvt ppx   - oob/ambulate     Subjective/Objective   Chief Complaint: l thigh mass/hidradenitis     Subjective: Andrew Tobias  Patient describes pain after wound vac change yesterday that was responsive to pain medication  No N/v/f/c  Having bowel movements, urinating, tolerating diet  Objective:     Blood pressure 113/69, pulse 72, temperature 98 5 °F (36 9 °C), temperature source Oral, resp  rate 19, height 5' 6 75" (1 695 m), weight (!) 160 kg (353 lb 9 9 oz), SpO2 94 %  ,Body mass index is 55 8 kg/m²  Intake/Output Summary (Last 24 hours) at 4/21/2021 0617  Last data filed at 4/21/2021 0554  Gross per 24 hour   Intake 1950 ml   Output 3800 ml   Net -1850 ml       Invasive Devices     Peripheral Intravenous Line            Peripheral IV 04/20/21 Dorsal (posterior); Right Hand less than 1 day          Drain            Negative Pressure Wound Therapy (V A C ) Other (Comment) 14 days                Physical Exam:     NAD, alert and oriented x3  Normocephalic, atraumatic  MMM, EOMI, PERRLA  Norm resp effort on RA  RRR  Abd soft, NT/ND  L groin wound vac in place with serosanguinous drainage in canister     No calf tenderness or peripheral edema  Motor/sensation intact in distal extremities  CN grossly intact  -rash/lesions       Lab, Imaging and other studies:  CBC:   Lab Results   Component Value Date    WBC 11 29 (H) 04/21/2021    HGB 9 7 (L) 04/21/2021    HCT 31 7 (L) 04/21/2021    MCV 89 04/21/2021     (H) 04/21/2021    MCH 27 3 04/21/2021    MCHC 30 6 (L) 04/21/2021    RDW 13 5 04/21/2021    MPV 8 8 (L) 04/21/2021    NRBC 0 04/21/2021   , CMP: No results found for: SODIUM, K, CL, CO2, ANIONGAP, BUN, CREATININE, GLUCOSE, CALCIUM, AST, ALT, ALKPHOS, PROT, BILITOT, EGFR, Coagulation: No results found for: PT, INR, APTT, Urinalysis: No results found for: COLORU, CLARITYU, SPECGRAV, PHUR, LEUKOCYTESUR, NITRITE, PROTEINUA, GLUCOSEU, KETONESU, BILIRUBINUR, BLOODU, Amylase: No results found for: AMYLASE, Lipase: No results found for: LIPASE  VTE Pharmacologic Prophylaxis: Enoxaparin (Lovenox)  VTE Mechanical Prophylaxis: sequential compression device     Victorino Garcia [facility]  4/21/21

## 2021-04-21 NOTE — PROGRESS NOTES
Cardiology Progress Note - Sofy Short 45 y o  male MRN: [id number]    Unit/Bed#: [facility] 512-01 Encounter: [id number]      Assessment:  Principal Problem:    Left groin mass  Active Problems:    CAD (coronary artery disease)    Class 3 severe obesity due to excess calories with serious comorbidity and body mass index (BMI) of 50 0 to 59 9 in adult [facility])      Plan:  Patient with no chest pain or significant dyspnea  He had no issues overnight  General surgery note is appreciated  Wound VAC was changed yesterday  For wound closure on 04/22  Vital signs stable on current medical regimen  CBC today with hemoglobin of 9 7 and white cell count of 11 3  Subjective:   Patient seen and examined  No significant events overnight   negative  Objective:     Vitals: Blood pressure 127/56, pulse 63, temperature 97 9 °F (36 6 °C), temperature source Oral, resp  rate 19, height 5' 6 75" (1 695 m), weight (!) 160 kg (353 lb 9 9 oz), SpO2 92 %  , Body mass index is 55 8 kg/m² ,   Orthostatic Blood Pressures      Most Recent Value   Blood Pressure  127/56 filed at 04/21/2021 5862   Patient Position - Orthostatic VS  Lying filed at 04/20/2021 0560      ,      Intake/Output Summary (Last 24 hours) at 4/21/2021 0933  Last data filed at 4/21/2021 0857  Gross per 24 hour   Intake 1560 ml   Output 3800 ml   Net -2240 ml           Physical Exam:    GEN: Sofy Short appears well, alert and oriented x 3, pleasant and cooperative   NECK: supple, no carotid bruits, no JVD or HJR  HEART: normal rate, regular rhythm, normal S1 and S2, no murmurs, clicks, gallops or rubs   LUNGS: clear to auscultation bilaterally; no wheezes, rales, or rhonchi  EXTREMITIES:  edema  SKIN: warm and well perfused, no suspicious lesions on exposed skin    Labs & Results:    No results displayed because visit has over 200 results            Cta Chest Wo W Contrast    Result Date: 4/7/2021  Narrative: CTA - CHEST WITH IV CONTRAST - PULMONARY ANGIOGRAM INDICATION: "r/o PE "  Postop excision of spinal mass on 4/6/2021  Difficult intubation and hypoxia throughout most of the case  History of coronary artery disease  COMPARISON: Chest radiograph from 4/6/2021 and abdomen CT from 2/22/2021  TECHNIQUE: CT angiogram timed for optimal opacification of the pulmonary arteries  Axial, sagittal, and coronal 2D reformats created from source data  Coronal 3D MIP postprocessing on the acquisition scanner  Radiation dose length product (DLP):  1594 69 mGy-cm   Radiation dose exposure minimized using iterative reconstruction and automated exposure control  IV Contrast:  100 mL of iohexol (OMNIPAQUE)  FINDINGS: PULMONARY ARTERIES:  With moderate compromised by respiratory motion producing artifactual filling defects in the vessels of the upper and mid lungs, no definite acute pulmonary embolus  LUNGS:  Evaluation mildly compromised by respiratory motion  Complete left lower lobe and partial right lower lobe atelectasis  Partial atelectasis of the right middle lobe  Triangular nonenhancing area of juxtapleural ground glass opacity in the posterior basal right lower lobe (2/130 - 153; 601/139)  AIRWAYS: No significant filling defects  PLEURA:  Unremarkable  HEART/GREAT VESSELS:  Mild cardiomegaly  Mild coronary artery calcification indicating atherosclerotic heart disease  MEDIASTINUM AND SLAVA:  ET tube 5 cm above the nicolasa  CHEST WALL AND LOWER NECK: 9 mm left thyroid nodule (2/18)  No follow-up is needed  UPPER ABDOMEN:  NG tube in stomach  OSSEOUS STRUCTURES:  Mild degenerative disease in the spine  Impression: With moderate compromise by respiratory motion, particularly in the upper lungs, no definite acute pulmonary embolus  Nonenhancing peripheral wedge-shaped area of groundglass opacity in the right lower lobe which has the appearance of a pulmonary infarct  Complete left lower lobe atelectasis and partial right lower lobe and right middle lobe atelectasis   Mild coronary artery calcification compatible with known coronary artery disease  I personally discussed this study with Nikhil Spencer on 4/7/2021 at 8:38 AM  Workstation performed: [id number]     Stat Cxr Icu    Result Date: 4/6/2021  Narrative: CHEST INDICATION:   ETT positioning  Patient has suspected COVID-19  COMPARISON:  11/29/2020 EXAM PERFORMED/VIEWS:  XR CHEST PORTABLE ICU FINDINGS:  Endotracheal tube is present, in satisfactory position with its tip above the level of the nicolasa  Enteric tube is present with its tip extending below the left hemidiaphragm  Cardiomediastinal silhouette appears unremarkable  Patchy densities at the right lung base noted  Osseous structures appear within normal limits for patient age  Impression: 1  Interval intubation  2   Right basilar opacities possibly atelectasis versus pneumonia or aspiration  Workstation performed: [id number]     Vas Lower Limb Venous Duplex Study, Complete Bilateral    Result Date: 4/7/2021  Narrative:  [facility] REPORT CLINICAL: Indications: Patient presents with left lower extremity pain s/p left thigh mass excision and recent pulmonary infarct  Patient is currently receiving Heparin  Operative History: Coronary angioplasty with stent placement Risk Factors The patient has history of Obesity, HTN, Diabetes, HLD, CAD, MI and smoking (current) 1-2 ppd  FINDINGS:  Segment       Right            Left                        Impression       Impression       CFV           Normal (Patent)  Normal (Patent)  GSV Inguinal  Normal (Patent)  Normal (Patent)     CONCLUSION:  Impression:  RIGHT LOWER LIMB: No evidence of acute or chronic deep vein thrombosis  No evidence of superficial thrombophlebitis noted  Doppler evaluation shows a normal response to augmentation maneuvers  Popliteal, posterior tibial and anterior tibial arterial Doppler waveforms are triphasic  LEFT LOWER LIMB: No evidence of acute or chronic deep vein thrombosis   No evidence of superficial thrombophlebitis noted  Doppler evaluation shows a normal response to augmentation maneuvers  Popliteal, posterior tibial and anterior tibial arterial Doppler waveforms are triphasic  Technical findings were faxed to chart  SIGNATURE: Electronically Signed by: Shauna Garcia on 2021-04-07 05:32:49 PM    Us Bedside Procedure    Result Date: 4/6/2021  Narrative: 1 2 840 [id number] 2 323 [id number] 3    Us Bedside Procedure    Result Date: 4/6/2021  Narrative: 1 2 840 [id number] 2 323 [id number] 2      EKG personally reviewed by Maribel Pride MD      Counseling / Coordination of Care  Total floor / unit time spent today 30 minutes  Greater than 50% of total time was spent with the patient and / or family counseling and / or coordination of care

## 2021-04-21 NOTE — CASE MANAGEMENT
Net with patient to discuss discharge needs  He is for OR tomorrow for wound closure  Patient is aware [facility] has accepted him for follow up SN and therapy  Patient does not want bariatric commode and plans to stay on first floor of the home  CM continues to follow for dc planning

## 2021-04-22 ENCOUNTER — ANESTHESIA (INPATIENT)
Dept: PERIOP | Facility: HOSPITAL | Age: 41
DRG: 570 | End: 2021-04-22
Payer: COMMERCIAL

## 2021-04-22 LAB
ANION GAP SERPL CALCULATED.3IONS-SCNC: 6 MMOL/L (ref 4–13)
BASOPHILS # BLD AUTO: 0.04 THOUSANDS/ΜL (ref 0–0.1)
BASOPHILS NFR BLD AUTO: 0 % (ref 0–1)
BUN SERPL-MCNC: 15 MG/DL (ref 5–25)
CALCIUM SERPL-MCNC: 8.6 MG/DL (ref 8.3–10.1)
CHLORIDE SERPL-SCNC: 105 MMOL/L (ref 100–108)
CO2 SERPL-SCNC: 28 MMOL/L (ref 21–32)
CREAT SERPL-MCNC: 0.9 MG/DL (ref 0.6–1.3)
EOSINOPHIL # BLD AUTO: 0.4 THOUSAND/ΜL (ref 0–0.61)
EOSINOPHIL NFR BLD AUTO: 4 % (ref 0–6)
ERYTHROCYTE [DISTWIDTH] IN BLOOD BY AUTOMATED COUNT: 13.5 % (ref 11.6–15.1)
GFR SERPL CREATININE-BSD FRML MDRD: 107 ML/MIN/1.73SQ M
GLUCOSE SERPL-MCNC: 129 MG/DL (ref 65–140)
GLUCOSE SERPL-MCNC: 139 MG/DL (ref 65–140)
GLUCOSE SERPL-MCNC: 149 MG/DL (ref 65–140)
GLUCOSE SERPL-MCNC: 149 MG/DL (ref 65–140)
GLUCOSE SERPL-MCNC: 173 MG/DL (ref 65–140)
GLUCOSE SERPL-MCNC: 195 MG/DL (ref 65–140)
HCT VFR BLD AUTO: 31.2 % (ref 36.5–49.3)
HGB BLD-MCNC: 9.7 G/DL (ref 12–17)
IMM GRANULOCYTES # BLD AUTO: 0.08 THOUSAND/UL (ref 0–0.2)
IMM GRANULOCYTES NFR BLD AUTO: 1 % (ref 0–2)
LYMPHOCYTES # BLD AUTO: 1.86 THOUSANDS/ΜL (ref 0.6–4.47)
LYMPHOCYTES NFR BLD AUTO: 18 % (ref 14–44)
MCH RBC QN AUTO: 27.9 PG (ref 26.8–34.3)
MCHC RBC AUTO-ENTMCNC: 31.1 G/DL (ref 31.4–37.4)
MCV RBC AUTO: 90 FL (ref 82–98)
MONOCYTES # BLD AUTO: 0.73 THOUSAND/ΜL (ref 0.17–1.22)
MONOCYTES NFR BLD AUTO: 7 % (ref 4–12)
NEUTROPHILS # BLD AUTO: 7.39 THOUSANDS/ΜL (ref 1.85–7.62)
NEUTS SEG NFR BLD AUTO: 70 % (ref 43–75)
NRBC BLD AUTO-RTO: 0 /100 WBCS
PLATELET # BLD AUTO: 569 THOUSANDS/UL (ref 149–390)
PMV BLD AUTO: 8.9 FL (ref 8.9–12.7)
POTASSIUM SERPL-SCNC: 4.1 MMOL/L (ref 3.5–5.3)
RBC # BLD AUTO: 3.48 MILLION/UL (ref 3.88–5.62)
SODIUM SERPL-SCNC: 139 MMOL/L (ref 136–145)
WBC # BLD AUTO: 10.5 THOUSAND/UL (ref 4.31–10.16)

## 2021-04-22 PROCEDURE — 0JBM0ZZ EXCISION OF LEFT UPPER LEG SUBCUTANEOUS TISSUE AND FASCIA, OPEN APPROACH: ICD-10-PCS | Performed by: SURGERY

## 2021-04-22 PROCEDURE — NC001 PR NO CHARGE: Performed by: SURGERY

## 2021-04-22 PROCEDURE — 82948 REAGENT STRIP/BLOOD GLUCOSE: CPT

## 2021-04-22 PROCEDURE — 80048 BASIC METABOLIC PNL TOTAL CA: CPT | Performed by: STUDENT IN AN ORGANIZED HEALTH CARE EDUCATION/TRAINING PROGRAM

## 2021-04-22 PROCEDURE — 11042 DBRDMT SUBQ TIS 1ST 20SQCM/<: CPT | Performed by: SURGERY

## 2021-04-22 PROCEDURE — 97606 NEG PRS WND THER DME>50 SQCM: CPT | Performed by: SURGERY

## 2021-04-22 PROCEDURE — 11045 DBRDMT SUBQ TISS EACH ADDL: CPT | Performed by: SURGERY

## 2021-04-22 PROCEDURE — 99232 SBSQ HOSP IP/OBS MODERATE 35: CPT | Performed by: INTERNAL MEDICINE

## 2021-04-22 PROCEDURE — 99232 SBSQ HOSP IP/OBS MODERATE 35: CPT | Performed by: PHYSICIAN ASSISTANT

## 2021-04-22 PROCEDURE — 99024 POSTOP FOLLOW-UP VISIT: CPT | Performed by: SURGERY

## 2021-04-22 PROCEDURE — 85025 COMPLETE CBC W/AUTO DIFF WBC: CPT | Performed by: STUDENT IN AN ORGANIZED HEALTH CARE EDUCATION/TRAINING PROGRAM

## 2021-04-22 RX ORDER — KETAMINE HCL IN NACL, ISO-OSM 100MG/10ML
SYRINGE (ML) INJECTION AS NEEDED
Status: DISCONTINUED | OUTPATIENT
Start: 2021-04-22 | End: 2021-04-22

## 2021-04-22 RX ORDER — LIDOCAINE HYDROCHLORIDE 10 MG/ML
INJECTION, SOLUTION EPIDURAL; INFILTRATION; INTRACAUDAL; PERINEURAL AS NEEDED
Status: DISCONTINUED | OUTPATIENT
Start: 2021-04-22 | End: 2021-04-22

## 2021-04-22 RX ORDER — HYDROMORPHONE HCL/PF 1 MG/ML
0.4 SYRINGE (ML) INJECTION
Status: DISCONTINUED | OUTPATIENT
Start: 2021-04-22 | End: 2021-04-22 | Stop reason: HOSPADM

## 2021-04-22 RX ORDER — SODIUM CHLORIDE, SODIUM LACTATE, POTASSIUM CHLORIDE, CALCIUM CHLORIDE 600; 310; 30; 20 MG/100ML; MG/100ML; MG/100ML; MG/100ML
INJECTION, SOLUTION INTRAVENOUS CONTINUOUS PRN
Status: DISCONTINUED | OUTPATIENT
Start: 2021-04-22 | End: 2021-04-22

## 2021-04-22 RX ORDER — CEFAZOLIN SODIUM 1 G/3ML
INJECTION, POWDER, FOR SOLUTION INTRAMUSCULAR; INTRAVENOUS AS NEEDED
Status: DISCONTINUED | OUTPATIENT
Start: 2021-04-22 | End: 2021-04-22

## 2021-04-22 RX ORDER — PROPOFOL 10 MG/ML
INJECTION, EMULSION INTRAVENOUS AS NEEDED
Status: DISCONTINUED | OUTPATIENT
Start: 2021-04-22 | End: 2021-04-22

## 2021-04-22 RX ORDER — ONDANSETRON 2 MG/ML
4 INJECTION INTRAMUSCULAR; INTRAVENOUS ONCE AS NEEDED
Status: DISCONTINUED | OUTPATIENT
Start: 2021-04-22 | End: 2021-04-22

## 2021-04-22 RX ORDER — ONDANSETRON 2 MG/ML
INJECTION INTRAMUSCULAR; INTRAVENOUS AS NEEDED
Status: DISCONTINUED | OUTPATIENT
Start: 2021-04-22 | End: 2021-04-22

## 2021-04-22 RX ORDER — SUCCINYLCHOLINE/SOD CL,ISO/PF 100 MG/5ML
SYRINGE (ML) INTRAVENOUS AS NEEDED
Status: DISCONTINUED | OUTPATIENT
Start: 2021-04-22 | End: 2021-04-22

## 2021-04-22 RX ORDER — MAGNESIUM HYDROXIDE 1200 MG/15ML
LIQUID ORAL AS NEEDED
Status: DISCONTINUED | OUTPATIENT
Start: 2021-04-22 | End: 2021-04-22 | Stop reason: HOSPADM

## 2021-04-22 RX ORDER — ALBUTEROL SULFATE 90 UG/1
AEROSOL, METERED RESPIRATORY (INHALATION) AS NEEDED
Status: DISCONTINUED | OUTPATIENT
Start: 2021-04-22 | End: 2021-04-22

## 2021-04-22 RX ORDER — ONDANSETRON 2 MG/ML
4 INJECTION INTRAMUSCULAR; INTRAVENOUS ONCE AS NEEDED
Status: DISCONTINUED | OUTPATIENT
Start: 2021-04-22 | End: 2021-04-22 | Stop reason: HOSPADM

## 2021-04-22 RX ORDER — MIDAZOLAM HYDROCHLORIDE 2 MG/2ML
INJECTION, SOLUTION INTRAMUSCULAR; INTRAVENOUS AS NEEDED
Status: DISCONTINUED | OUTPATIENT
Start: 2021-04-22 | End: 2021-04-22

## 2021-04-22 RX ORDER — FLUCONAZOLE 200 MG/1
200 TABLET ORAL DAILY
Status: DISCONTINUED | OUTPATIENT
Start: 2021-04-22 | End: 2021-04-29 | Stop reason: HOSPADM

## 2021-04-22 RX ORDER — NYSTATIN 100000 [USP'U]/G
POWDER TOPICAL 2 TIMES DAILY
Status: DISCONTINUED | OUTPATIENT
Start: 2021-04-22 | End: 2021-04-29 | Stop reason: HOSPADM

## 2021-04-22 RX ORDER — HYDROMORPHONE HCL/PF 1 MG/ML
0.4 SYRINGE (ML) INJECTION
Status: DISCONTINUED | OUTPATIENT
Start: 2021-04-22 | End: 2021-04-22

## 2021-04-22 RX ORDER — FENTANYL CITRATE 50 UG/ML
INJECTION, SOLUTION INTRAMUSCULAR; INTRAVENOUS AS NEEDED
Status: DISCONTINUED | OUTPATIENT
Start: 2021-04-22 | End: 2021-04-22

## 2021-04-22 RX ADMIN — ONDANSETRON 4 MG: 2 INJECTION INTRAMUSCULAR; INTRAVENOUS at 08:10

## 2021-04-22 RX ADMIN — TICAGRELOR 90 MG: 90 TABLET ORAL at 09:00

## 2021-04-22 RX ADMIN — MIDAZOLAM 2 MG: 1 INJECTION INTRAMUSCULAR; INTRAVENOUS at 08:02

## 2021-04-22 RX ADMIN — ATORVASTATIN CALCIUM 80 MG: 80 TABLET, FILM COATED ORAL at 16:32

## 2021-04-22 RX ADMIN — HYDROMORPHONE HYDROCHLORIDE 6 MG: 2 TABLET ORAL at 14:33

## 2021-04-22 RX ADMIN — PROPOFOL 200 MG: 10 INJECTION, EMULSION INTRAVENOUS at 08:09

## 2021-04-22 RX ADMIN — TICAGRELOR 90 MG: 90 TABLET ORAL at 20:37

## 2021-04-22 RX ADMIN — HYDROMORPHONE HYDROCHLORIDE 6 MG: 2 TABLET ORAL at 19:20

## 2021-04-22 RX ADMIN — CARVEDILOL 6.25 MG: 6.25 TABLET, FILM COATED ORAL at 16:32

## 2021-04-22 RX ADMIN — ENOXAPARIN SODIUM 60 MG: 60 INJECTION SUBCUTANEOUS at 09:00

## 2021-04-22 RX ADMIN — HYDROMORPHONE HYDROCHLORIDE 6 MG: 2 TABLET ORAL at 06:15

## 2021-04-22 RX ADMIN — Medication 180 MG: at 08:10

## 2021-04-22 RX ADMIN — METHOCARBAMOL TABLETS 750 MG: 750 TABLET, COATED ORAL at 17:45

## 2021-04-22 RX ADMIN — CEFAZOLIN 3000 MG: 1 INJECTION, POWDER, FOR SOLUTION INTRAMUSCULAR; INTRAVENOUS at 08:29

## 2021-04-22 RX ADMIN — Medication 20 MG: at 08:20

## 2021-04-22 RX ADMIN — HYDROMORPHONE HYDROCHLORIDE 1 MG: 1 INJECTION, SOLUTION INTRAMUSCULAR; INTRAVENOUS; SUBCUTANEOUS at 11:26

## 2021-04-22 RX ADMIN — FENTANYL CITRATE 50 MCG: 50 INJECTION INTRAMUSCULAR; INTRAVENOUS at 08:09

## 2021-04-22 RX ADMIN — HYDROMORPHONE HYDROCHLORIDE 1 MG: 1 INJECTION, SOLUTION INTRAMUSCULAR; INTRAVENOUS; SUBCUTANEOUS at 20:36

## 2021-04-22 RX ADMIN — METHOCARBAMOL TABLETS 750 MG: 750 TABLET, COATED ORAL at 23:44

## 2021-04-22 RX ADMIN — PROPOFOL 100 MG: 10 INJECTION, EMULSION INTRAVENOUS at 08:10

## 2021-04-22 RX ADMIN — METHOCARBAMOL TABLETS 750 MG: 750 TABLET, COATED ORAL at 00:14

## 2021-04-22 RX ADMIN — GABAPENTIN 200 MG: 100 CAPSULE ORAL at 09:00

## 2021-04-22 RX ADMIN — HYDROMORPHONE HYDROCHLORIDE 1 MG: 1 INJECTION, SOLUTION INTRAMUSCULAR; INTRAVENOUS; SUBCUTANEOUS at 00:14

## 2021-04-22 RX ADMIN — CHLORHEXIDINE GLUCONATE 0.12% ORAL RINSE 15 ML: 1.2 LIQUID ORAL at 20:36

## 2021-04-22 RX ADMIN — ACETAMINOPHEN 975 MG: 325 TABLET ORAL at 06:15

## 2021-04-22 RX ADMIN — SODIUM CHLORIDE, SODIUM LACTATE, POTASSIUM CHLORIDE, AND CALCIUM CHLORIDE: .6; .31; .03; .02 INJECTION, SOLUTION INTRAVENOUS at 08:02

## 2021-04-22 RX ADMIN — HYDROMORPHONE HYDROCHLORIDE 6 MG: 2 TABLET ORAL at 10:50

## 2021-04-22 RX ADMIN — GABAPENTIN 200 MG: 100 CAPSULE ORAL at 20:37

## 2021-04-22 RX ADMIN — METHOCARBAMOL TABLETS 750 MG: 750 TABLET, COATED ORAL at 11:26

## 2021-04-22 RX ADMIN — ENOXAPARIN SODIUM 60 MG: 60 INJECTION SUBCUTANEOUS at 20:36

## 2021-04-22 RX ADMIN — ACETAMINOPHEN 975 MG: 325 TABLET ORAL at 21:41

## 2021-04-22 RX ADMIN — ALBUTEROL SULFATE 6 PUFF: 90 AEROSOL, METERED RESPIRATORY (INHALATION) at 08:27

## 2021-04-22 RX ADMIN — HYDROMORPHONE HYDROCHLORIDE 1 MG: 1 INJECTION, SOLUTION INTRAMUSCULAR; INTRAVENOUS; SUBCUTANEOUS at 16:32

## 2021-04-22 RX ADMIN — METHOCARBAMOL TABLETS 750 MG: 750 TABLET, COATED ORAL at 06:15

## 2021-04-22 RX ADMIN — HYDROMORPHONE HYDROCHLORIDE 1 MG: 1 INJECTION, SOLUTION INTRAMUSCULAR; INTRAVENOUS; SUBCUTANEOUS at 06:44

## 2021-04-22 RX ADMIN — GABAPENTIN 200 MG: 100 CAPSULE ORAL at 16:32

## 2021-04-22 RX ADMIN — INSULIN LISPRO 4 UNITS: 100 INJECTION, SOLUTION INTRAVENOUS; SUBCUTANEOUS at 16:32

## 2021-04-22 RX ADMIN — LIDOCAINE HYDROCHLORIDE 50 MG: 10 INJECTION, SOLUTION EPIDURAL; INFILTRATION; INTRACAUDAL; PERINEURAL at 08:09

## 2021-04-22 RX ADMIN — ASPIRIN 81 MG: 81 TABLET, CHEWABLE ORAL at 11:03

## 2021-04-22 RX ADMIN — INSULIN LISPRO 4 UNITS: 100 INJECTION, SOLUTION INTRAVENOUS; SUBCUTANEOUS at 21:41

## 2021-04-22 RX ADMIN — HYDROMORPHONE HYDROCHLORIDE 6 MG: 2 TABLET ORAL at 23:44

## 2021-04-22 RX ADMIN — DOCUSATE SODIUM AND SENNOSIDES 1 TABLET: 8.6; 5 TABLET ORAL at 21:41

## 2021-04-22 RX ADMIN — ACETAMINOPHEN 975 MG: 325 TABLET ORAL at 14:32

## 2021-04-22 NOTE — ANESTHESIA POSTPROCEDURE EVALUATION
Post-Op Assessment Note    CV Status:  Stable    Pain management: adequate     Mental Status:  Alert and awake   Hydration Status:  Euvolemic   PONV Controlled:  Controlled   Airway Patency:  Patent      Post Op Vitals Reviewed: Yes      Staff: CRNA, Anesthesiologist         No complications documented      BP   130/83   Temp   99 1   Pulse  79   Resp   20   SpO2  98

## 2021-04-22 NOTE — ANESTHESIA PREPROCEDURE EVALUATION
Procedure:  SKIN GRAFT SPLIT THICKNESS (STSG)  EXTREMITY (Left Thigh)  APPLICATION VAC DRESSING THIGH (Left Thigh)    Relevant Problems   CARDIO   (+) CAD (coronary artery disease)   (+) HTN (hypertension)   (+) Hypertriglyceridemia   (+) Type 1 non-ST elevation myocardial infarction (NSTEMI) (HCC)      ENDO   (+) DM type 2, uncontrolled, with neuropathy (HCC)        Physical Exam    Airway    Mallampati score: IV  TM Distance: <3 FB  Neck ROM: limited     Dental   No notable dental hx     Cardiovascular      Pulmonary  Comment: Patient quit smoking cigarettes about 3 weeks ago  Used to smoke 1 5 packs a day,     Other Findings        Anesthesia Plan  ASA Score- 3     Anesthesia Type- general with ASA Monitors  Additional Monitors:   Airway Plan: ETT  Plan Factors-Exercise tolerance (METS): <4 METS  Chart reviewed  Existing labs reviewed  Patient is not a current smoker  Patient instructed to abstain from smoking on day of procedure  Patient did not smoke on day of surgery  Induction- intravenous  Postoperative Plan-     Informed Consent- Anesthetic plan and risks discussed with patient  I personally reviewed this patient with the CRNA  Discussed and agreed on the Anesthesia Plan with the BRADFORD Guerra

## 2021-04-22 NOTE — PROGRESS NOTES
Progress Note - General Surgery   Anthony Payne 45 y o  male MRN: [id number]  Unit/Bed#: [facility] 512-01 Encounter: [id number]    Assessment:  45 y o  M with L thigh soft tissue mass s/p excision and vac placement 4/6  S/p L thigh washout and vac placement 4/17     Afebrile  VSS  Wound vac changed on 4/20 with 400 cc serosanguinous drainage from wound o/n         Plan:  - OR today for STSG  - NPO since midnight  - f/u labs  - aps for pain  - dvt ppx     Subjective/Objective   Chief Complaint: L thigh mass/hidraenitis     Subjective: Adeola Ellison  Patient describes some mild pain though improved  No n/v/f/c  NPO since midnight for OR today  + BM and urination     Objective:     Blood pressure (!) 111/47, pulse 57, temperature 98 3 °F (36 8 °C), temperature source Oral, resp  rate 18, height 5' 6 75" (1 695 m), weight (!) 160 kg (353 lb 9 9 oz), SpO2 93 %  ,Body mass index is 55 8 kg/m²  Intake/Output Summary (Last 24 hours) at 4/22/2021 0601  Last data filed at 4/21/2021 2317  Gross per 24 hour   Intake 780 ml   Output 1775 ml   Net -995 ml       Invasive Devices     Peripheral Intravenous Line            Peripheral IV 04/20/21 Dorsal (posterior); Right Hand 1 day          Drain            Negative Pressure Wound Therapy (V A C ) Other (Comment) 15 days                Physical Exam:     NAD, alert and oriented x3  Normocephalic, atraumatic  MMM, EOMI, PERRLA  Norm resp effort on RA  RRR  Abd soft, NT/ND  L groin wound vac in place with serosanguinous drainage in canister     No calf tenderness or peripheral edema  Motor/sensation intact in distal extremities  CN grossly intact  -rash/lesions    Lab, Imaging and other studies:CBC: No results found for: WBC, HGB, HCT, MCV, PLT, ADJUSTEDWBC, MCH, MCHC, RDW, MPV, NRBC, CMP: No results found for: SODIUM, K, CL, CO2, ANIONGAP, BUN, CREATININE, GLUCOSE, CALCIUM, AST, ALT, ALKPHOS, PROT, BILITOT, EGFR, Coagulation: No results found for: PT, INR, APTT, Urinalysis: No results found for:  Ximena Comber, SPECGRAV, PHUR, LEUKOCYTESUR, NITRITE, PROTEINUA, GLUCOSEU, KETONESU, BILIRUBINUR, BLOODU, Amylase: No results found for: AMYLASE, Lipase: No results found for: LIPASE  VTE Pharmacologic Prophylaxis: Enoxaparin (Lovenox)  VTE Mechanical Prophylaxis: sequential compression device     Victorino Garcia [facility]  4/22/21

## 2021-04-22 NOTE — PROGRESS NOTES
Progress Note - Acute Pain Service    Lisa Hale 45 y o  male MRN: [id number]  Unit/Bed#: OR POOL Encounter: [id number]      Assessment:   Principal Problem:    Left groin mass  Active Problems:    CAD (coronary artery disease)    Class 3 severe obesity due to excess calories with serious comorbidity and body mass index (BMI) of 50 0 to 59 9 in [facility])    Lisa Hale is a 45 y o  male  Admitted on 4/6/21 for mass excision of hidradenitis in the left proximal thigh with VAC dressing placement   Patient's pain has been difficult to control, therefore APS consulted        Plan:    Continue Tylenol 975 mg p o  q 8 hours scheduled   Continue Dilaudid 4 mg p o  q 4 hours p r n  moderate pain   Continue Dilaudid 6 mg p o  q 4 hours p r n  severe pain   Continue Dilaudid 1 mg IV q 4 hours p r n  breakthrough pain, plan to decrease to 0 5 mg IV q 4 hours p r n  breakthrough pain tomorrow   Will discontinue ketamine infusion   Continue p r n  Haldol, Ativan and glycopyrrolate while on ketamine infusion   Increase Neurontin to 300 mg p o  t i d  scheduled   Continue Robaxin 750 mg p o  q 6 hours scheduled   Continue bowel regimen to avoid opioid induced constipation  APS will continue to follow  Please call  / [phone] or POS on [facility] Acute Pain Service - B (/ between [phone] and on weekends) with questions or concerns    Pain History  Current pain location(s):   Left leg  Pain Scale:    8/10  Quality:  burning  24 hour history:  Patient is now status post Washout and debridement to left thigh wound  Ketamine initiated yesterday preoperatively  Ketamine  This morning  Patient states that he was having dysphoria  Opioid requirement previous 24 hours:  Dilaudid 36 mg p o , Dilaudid 6 mg IV, fentanyl 50 mcg IV      Meds/Allergies   all current active meds have been reviewed, current meds:   Current Facility-Administered Medications   Medication Dose Route Frequency  acetaminophen (TYLENOL) tablet 975 mg  975 mg Oral Q8H [address]    aspirin chewable tablet 81 mg  81 mg Oral Daily    atorvastatin (LIPITOR) tablet 80 mg  80 mg Oral Daily With Dinner    bisacodyl (DULCOLAX) EC tablet 5 mg  5 mg Oral Daily PRN    carvedilol (COREG) tablet 6 25 mg  6 25 mg Oral BID With Meals    chlorhexidine (PERIDEX) 0 12 % oral rinse 15 mL  15 mL Swish & Spit Q12H [address]    enoxaparin (LOVENOX) subcutaneous injection 60 mg  60 mg Subcutaneous Q12H [address]    fluconazole (DIFLUCAN) tablet 200 mg  200 mg Oral Daily    gabapentin (NEURONTIN) capsule 200 mg  200 mg Oral TID    glycopyrrolate (ROBINUL) injection 0 2 mg  0 2 mg Intravenous Q4H PRN    haloperidol lactate (HALDOL) injection 2 mg  2 mg Intramuscular Q30 Min PRN    HYDROmorphone (DILAUDID) injection 0 4 mg  0 4 mg Intravenous Q5 Min PRN    HYDROmorphone (DILAUDID) injection 1 mg  1 mg Intravenous Q4H PRN    HYDROmorphone (DILAUDID) tablet 4 mg  4 mg Oral Q4H PRN    Or    HYDROmorphone (DILAUDID) tablet 6 mg  6 mg Oral Q4H PRN    insulin lispro (HumaLOG) 100 units/mL subcutaneous injection 4-20 Units  4-20 Units Subcutaneous 4x Daily (AC & HS)    ketamine 250 mg (STANDARD CONCENTRATION) IV in sodium chloride 0 9% 250 mL  0 1 mg/kg/hr Intravenous Continuous    LORazepam (ATIVAN) injection 1 mg  1 mg Intravenous Q1H PRN    methocarbamol (ROBAXIN) tablet 750 mg  750 mg Oral Q6H SHAZIA    naloxone (NARCAN) injection 0 1 mg  0 1 mg Intravenous Q3 min PRN    nystatin (MYCOSTATIN) powder   Topical BID    ondansetron (ZOFRAN) injection 4 mg  4 mg Intravenous Once PRN    polyethylene glycol (MIRALAX) packet 17 g  17 g Oral Daily    senna-docusate sodium (SENOKOT S) 8 6-50 mg per tablet 1 tablet  1 tablet Oral HS    sodium chloride (OCEAN) 0 65 % nasal spray 1 spray  1 spray Each Nare Q1H PRN    ticagrelor (BRILINTA) tablet 90 mg  90 mg Oral Q12H [address]    and PTA meds:   Prior to Admission Medications   Prescriptions Last Dose Informant Patient Reported? Taking? Omega-3 Fatty Acids (fish oil) 1,000 mg 4/5/2021 at 0500  No Yes   Sig: Take 1 capsule (1,000 mg total) by mouth 2 (two) times a day   Ticagrelor (BRILINTA PO) 4/5/2021 at 0500  Yes Yes   Sig: Take by mouth   amLODIPine (NORVASC) 10 mg tablet 4/5/2021 at 0500  No Yes   Sig: take 1 tablet by mouth once daily   aspirin (ECOTRIN LOW STRENGTH) 81 mg EC tablet 4/1/2021  No Yes   Sig: Take 1 tablet (81 mg total) by mouth daily   atorvastatin (LIPITOR) 80 mg tablet 4/4/2021 at 1900  No Yes   Sig: Take 1 tablet (80 mg total) by mouth daily   carvedilol (COREG) 6 25 mg tablet 4/5/2021 at 0500  No Yes   Sig: Take 1 tablet (6 25 mg total) by mouth 2 (two) times a day with meals   clindamycin (CLEOCIN) 150 mg capsule 4/5/2021 at 1700  No Yes   Sig: Take 1 capsule (150 mg total) by mouth every 12 (twelve) hours for 6 days   glimepiride (AMARYL) 4 mg tablet 4/5/2021 at 0500  No Yes   Sig: Take 1 tablet (4 mg total) by mouth daily with breakfast   lisinopril (ZESTRIL) 10 mg tablet 4/5/2021 at 0500  No Yes   Sig: Take 1 tablet (10 mg total) by mouth daily   metFORMIN (GLUCOPHAGE) 1000 MG tablet 4/5/2021 at 0500  No Yes   Sig: Take 1 tablet (1,000 mg total) by mouth 2 (two) times a day with meals      Facility-Administered Medications: None       Allergies   Allergen Reactions    Amoxicillin Hives       Objective     Temp:  [97 8 °F (36 6 °C)-99 1 °F (37 3 °C)] 97 8 °F (36 6 °C)  HR:  [53-89] 60  Resp:  [15-22] 22  BP: (106-146)/(47-83) 133/55    Physical Exam  Vitals signs and nursing note reviewed  Constitutional:       General: He is awake  He is not in acute distress  Comments: Appears uncomfortable  Skin:     General: Skin is warm and dry  Neurological:      Mental Status: He is alert and oriented to person, place, and time  GCS: GCS eye subscore is 4  GCS verbal subscore is 5  GCS motor subscore is 6  Psychiatric:         Behavior: Behavior is cooperative           Lab Results: Results from last 7 days   Lab Units 04/22/21  0607   WBC Thousand/uL 10 50*   HEMOGLOBIN g/dL 9 7*   HEMATOCRIT % 31 2*   PLATELETS Thousands/uL 569*      Results from last 7 days   Lab Units 04/22/21  0607   POTASSIUM mmol/L 4 1   CHLORIDE mmol/L 105   CO2 mmol/L 28   BUN mg/dL 15   CREATININE mg/dL 0 90   CALCIUM mg/dL 8 6       Imaging Studies: I have personally reviewed pertinent reports  EKG, Pathology, and Other Studies: I have personally reviewed pertinent reports  Counseling / Coordination of Care  Total floor / unit time spent today 30 minutes  Greater than 50% of total time was spent with the patient and / or family counseling and / or coordination of care  A description of the counseling / coordination of care:  Patient interview, physical examination, review of medical record, review of imaging and laboratory data, development of pain management plan, discussion of pain management plan with patient and primary service  Please note that the APS provides consultative services regarding pain management only  With the exception of ketamine and epidural infusions and except when indicated, final decisions regarding starting or changing doses of analgesic medications are at the discretion of the consulting service  Off hours consultation and/or medication management is generally not available      Rita Sharif PA-C  Acute Pain Service

## 2021-04-22 NOTE — PROGRESS NOTES
Progress Note - General Surgery   Major Olivarezers 45 y o  male MRN: [id number]  Unit/Bed#: [facility] 512-01 Encounter: [id number]    Assessment:  45 y o  M with L thigh soft tissue mass s/p excision and vac placement 4/6  S/p L thigh washout and vac placement 4/17  S/p L thigh washout and debridement  Aborting of STSG     Explained to patient in length on why we aborted the initial procedure  Patient verbalized understanding  Plan:  - Daily wet to dry dressings  - CM for VNA  - Diet as tolerated  - Pain nmanagement  - Encourage OOB/IS    Subjective/Objective   Subjective: Patient is doing well  Does have some pain  NO chest pain/SOB  NO N/V  Tolerating diet  Objective:     Blood pressure 127/74, pulse 62, temperature 97 8 °F (36 6 °C), resp  rate 19, height 5' 6 75" (1 695 m), weight (!) 160 kg (353 lb 9 9 oz), SpO2 92 %  ,Body mass index is 55 8 kg/m²  Intake/Output Summary (Last 24 hours) at 4/22/2021 1419  Last data filed at 4/22/2021 0923  Gross per 24 hour   Intake 1104 8 ml   Output 2450 ml   Net -1345 2 ml       Invasive Devices     Peripheral Intravenous Line            Peripheral IV 04/20/21 Dorsal (posterior); Right Hand 2 days                Physical Exam: General: AAOx3, NAD  HEENT: atraumatic, non-icteric sclera  Card: RRR, not tachycardic  Pulm: on RA, not tachypnic  Abd: Soft, non-tender, non distended  LE: Some serosang stainage in the superior portion of the dressings  Integumentary: no rashes        Lab, Imaging and other studies:CBC:   Lab Results   Component Value Date    WBC 10 50 (H) 04/22/2021    HGB 9 7 (L) 04/22/2021    HCT 31 2 (L) 04/22/2021    MCV 90 04/22/2021     (H) 04/22/2021    MCH 27 9 04/22/2021    MCHC 31 1 (L) 04/22/2021    RDW 13 5 04/22/2021    MPV 8 9 04/22/2021    NRBC 0 04/22/2021   , CMP:   Lab Results   Component Value Date    SODIUM 139 04/22/2021    K 4 1 04/22/2021     04/22/2021    CO2 28 04/22/2021    BUN 15 04/22/2021    CREATININE 0 90 04/22/2021 CALCIUM 8 6 04/22/2021    EGFR 107 04/22/2021   , Coagulation: No results found for: PT, INR, APTT, Urinalysis: No results found for: COLORU, CLARITYU, SPECGRAV, PHUR, LEUKOCYTESUR, NITRITE, PROTEINUA, GLUCOSEU, KETONESU, BILIRUBINUR, BLOODU, Amylase: No results found for: AMYLASE, Lipase: No results found for: LIPASE  VTE Pharmacologic Prophylaxis: Enoxaparin (Lovenox)  VTE Mechanical Prophylaxis: sequential compression device

## 2021-04-23 LAB
ANION GAP SERPL CALCULATED.3IONS-SCNC: 2 MMOL/L (ref 4–13)
BUN SERPL-MCNC: 17 MG/DL (ref 5–25)
CALCIUM SERPL-MCNC: 8.6 MG/DL (ref 8.3–10.1)
CHLORIDE SERPL-SCNC: 103 MMOL/L (ref 100–108)
CO2 SERPL-SCNC: 32 MMOL/L (ref 21–32)
CREAT SERPL-MCNC: 1.02 MG/DL (ref 0.6–1.3)
ERYTHROCYTE [DISTWIDTH] IN BLOOD BY AUTOMATED COUNT: 13.4 % (ref 11.6–15.1)
GFR SERPL CREATININE-BSD FRML MDRD: 92 ML/MIN/1.73SQ M
GLUCOSE SERPL-MCNC: 168 MG/DL (ref 65–140)
GLUCOSE SERPL-MCNC: 174 MG/DL (ref 65–140)
GLUCOSE SERPL-MCNC: 176 MG/DL (ref 65–140)
GLUCOSE SERPL-MCNC: 223 MG/DL (ref 65–140)
GLUCOSE SERPL-MCNC: 256 MG/DL (ref 65–140)
HCT VFR BLD AUTO: 29.1 % (ref 36.5–49.3)
HGB BLD-MCNC: 8.8 G/DL (ref 12–17)
MCH RBC QN AUTO: 27.2 PG (ref 26.8–34.3)
MCHC RBC AUTO-ENTMCNC: 30.2 G/DL (ref 31.4–37.4)
MCV RBC AUTO: 90 FL (ref 82–98)
PLATELET # BLD AUTO: 554 THOUSANDS/UL (ref 149–390)
PMV BLD AUTO: 9 FL (ref 8.9–12.7)
POTASSIUM SERPL-SCNC: 4.4 MMOL/L (ref 3.5–5.3)
RBC # BLD AUTO: 3.23 MILLION/UL (ref 3.88–5.62)
SODIUM SERPL-SCNC: 137 MMOL/L (ref 136–145)
WBC # BLD AUTO: 12.03 THOUSAND/UL (ref 4.31–10.16)

## 2021-04-23 PROCEDURE — 80048 BASIC METABOLIC PNL TOTAL CA: CPT | Performed by: SURGERY

## 2021-04-23 PROCEDURE — 99232 SBSQ HOSP IP/OBS MODERATE 35: CPT | Performed by: INTERNAL MEDICINE

## 2021-04-23 PROCEDURE — 82948 REAGENT STRIP/BLOOD GLUCOSE: CPT

## 2021-04-23 PROCEDURE — 99024 POSTOP FOLLOW-UP VISIT: CPT | Performed by: SURGERY

## 2021-04-23 PROCEDURE — 85027 COMPLETE CBC AUTOMATED: CPT | Performed by: SURGERY

## 2021-04-23 RX ORDER — HYDROMORPHONE HCL/PF 1 MG/ML
1 SYRINGE (ML) INJECTION ONCE
Status: COMPLETED | OUTPATIENT
Start: 2021-04-23 | End: 2021-04-23

## 2021-04-23 RX ORDER — HYDROMORPHONE HCL/PF 1 MG/ML
0.5 SYRINGE (ML) INJECTION
Status: DISCONTINUED | OUTPATIENT
Start: 2021-04-23 | End: 2021-04-28

## 2021-04-23 RX ADMIN — CHLORHEXIDINE GLUCONATE 0.12% ORAL RINSE 15 ML: 1.2 LIQUID ORAL at 20:29

## 2021-04-23 RX ADMIN — HYDROMORPHONE HYDROCHLORIDE 0.5 MG: 1 INJECTION, SOLUTION INTRAMUSCULAR; INTRAVENOUS; SUBCUTANEOUS at 20:29

## 2021-04-23 RX ADMIN — ACETAMINOPHEN 975 MG: 325 TABLET ORAL at 04:50

## 2021-04-23 RX ADMIN — TICAGRELOR 90 MG: 90 TABLET ORAL at 08:50

## 2021-04-23 RX ADMIN — HYDROMORPHONE HYDROCHLORIDE 6 MG: 2 TABLET ORAL at 22:52

## 2021-04-23 RX ADMIN — TICAGRELOR 90 MG: 90 TABLET ORAL at 20:28

## 2021-04-23 RX ADMIN — HYDROMORPHONE HYDROCHLORIDE 6 MG: 2 TABLET ORAL at 09:03

## 2021-04-23 RX ADMIN — NYSTATIN: 100000 POWDER TOPICAL at 18:02

## 2021-04-23 RX ADMIN — HYDROMORPHONE HYDROCHLORIDE 0.5 MG: 1 INJECTION, SOLUTION INTRAMUSCULAR; INTRAVENOUS; SUBCUTANEOUS at 14:46

## 2021-04-23 RX ADMIN — GABAPENTIN 200 MG: 100 CAPSULE ORAL at 20:29

## 2021-04-23 RX ADMIN — METHOCARBAMOL TABLETS 750 MG: 750 TABLET, COATED ORAL at 22:53

## 2021-04-23 RX ADMIN — METHOCARBAMOL TABLETS 750 MG: 750 TABLET, COATED ORAL at 04:50

## 2021-04-23 RX ADMIN — HYDROMORPHONE HYDROCHLORIDE 1 MG: 1 INJECTION, SOLUTION INTRAMUSCULAR; INTRAVENOUS; SUBCUTANEOUS at 13:20

## 2021-04-23 RX ADMIN — ACETAMINOPHEN 975 MG: 325 TABLET ORAL at 22:51

## 2021-04-23 RX ADMIN — HYDROMORPHONE HYDROCHLORIDE 6 MG: 2 TABLET ORAL at 18:02

## 2021-04-23 RX ADMIN — HYDROMORPHONE HYDROCHLORIDE 1 MG: 1 INJECTION, SOLUTION INTRAMUSCULAR; INTRAVENOUS; SUBCUTANEOUS at 02:15

## 2021-04-23 RX ADMIN — ASPIRIN 81 MG: 81 TABLET, CHEWABLE ORAL at 08:50

## 2021-04-23 RX ADMIN — ENOXAPARIN SODIUM 60 MG: 60 INJECTION SUBCUTANEOUS at 20:28

## 2021-04-23 RX ADMIN — SILVER NITRATE APPLICATORS 1 APPLICATOR: 25; 75 STICK TOPICAL at 13:01

## 2021-04-23 RX ADMIN — ATORVASTATIN CALCIUM 80 MG: 80 TABLET, FILM COATED ORAL at 18:01

## 2021-04-23 RX ADMIN — GABAPENTIN 200 MG: 100 CAPSULE ORAL at 18:01

## 2021-04-23 RX ADMIN — METHOCARBAMOL TABLETS 750 MG: 750 TABLET, COATED ORAL at 18:02

## 2021-04-23 RX ADMIN — METHOCARBAMOL TABLETS 750 MG: 750 TABLET, COATED ORAL at 11:54

## 2021-04-23 RX ADMIN — INSULIN LISPRO 4 UNITS: 100 INJECTION, SOLUTION INTRAVENOUS; SUBCUTANEOUS at 22:54

## 2021-04-23 RX ADMIN — POLYETHYLENE GLYCOL 3350 17 G: 17 POWDER, FOR SOLUTION ORAL at 08:52

## 2021-04-23 RX ADMIN — HYDROMORPHONE HYDROCHLORIDE 6 MG: 2 TABLET ORAL at 04:51

## 2021-04-23 RX ADMIN — CHLORHEXIDINE GLUCONATE 0.12% ORAL RINSE 15 ML: 1.2 LIQUID ORAL at 08:50

## 2021-04-23 RX ADMIN — ACETAMINOPHEN 975 MG: 325 TABLET ORAL at 13:04

## 2021-04-23 RX ADMIN — FLUCONAZOLE 200 MG: 200 TABLET ORAL at 08:52

## 2021-04-23 RX ADMIN — NYSTATIN 1 APPLICATION: 100000 POWDER TOPICAL at 08:59

## 2021-04-23 RX ADMIN — HYDROMORPHONE HYDROCHLORIDE 6 MG: 2 TABLET ORAL at 13:04

## 2021-04-23 RX ADMIN — CARVEDILOL 6.25 MG: 6.25 TABLET, FILM COATED ORAL at 18:02

## 2021-04-23 RX ADMIN — INSULIN LISPRO 8 UNITS: 100 INJECTION, SOLUTION INTRAVENOUS; SUBCUTANEOUS at 18:01

## 2021-04-23 RX ADMIN — CARVEDILOL 6.25 MG: 6.25 TABLET, FILM COATED ORAL at 07:39

## 2021-04-23 RX ADMIN — GABAPENTIN 200 MG: 100 CAPSULE ORAL at 08:50

## 2021-04-23 RX ADMIN — HYDROMORPHONE HYDROCHLORIDE 0.5 MG: 1 INJECTION, SOLUTION INTRAMUSCULAR; INTRAVENOUS; SUBCUTANEOUS at 23:55

## 2021-04-23 RX ADMIN — ENOXAPARIN SODIUM 60 MG: 60 INJECTION SUBCUTANEOUS at 08:52

## 2021-04-23 NOTE — NURSING NOTE
Bedside dressing change done by Eulalia Leyva PA-c  Now bleeding through dressing  Darshan Parrish notified via TT  Will come to bedside to reevaluate

## 2021-04-23 NOTE — PROGRESS NOTES
Cardiology Progress Note - Sofy Short 45 y o  male MRN: [id number]    Unit/Bed#: [facility] 512-01 Encounter: [id number]      Assessment:  Principal Problem:    Left groin mass  Active Problems:    CAD (coronary artery disease)    Class 3 severe obesity due to excess calories with serious comorbidity and body mass index (BMI) of 50 0 to 59 9 in adult [facility])      Plan:  Patient with no chest pain or significant dyspnea  He had no issues overnight  General surgery note appreciated  Vital signs are stable on current medical regimen  BMP today with potassium of 4 4 and creatinine of 1 02  Hemoglobin 8 8  Will continue current medical regimen  Subjective:   Patient seen and examined  No significant events overnight   negative  Objective:     Vitals: Blood pressure (!) 111/44, pulse 62, temperature 98 3 °F (36 8 °C), temperature source Oral, resp  rate 14, height 5' 6 75" (1 695 m), weight (!) 162 kg (356 lb 4 2 oz), SpO2 95 %  , Body mass index is 56 22 kg/m² ,   Orthostatic Blood Pressures      Most Recent Value   Blood Pressure  (!) 111/44 filed at 04/23/2021 7820   Patient Position - Orthostatic VS  Lying filed at 04/21/2021 1620      ,      Intake/Output Summary (Last 24 hours) at 4/23/2021 0931  Last data filed at 4/23/2021 0453  Gross per 24 hour   Intake 480 ml   Output 1650 ml   Net -1170 ml           Physical Exam:    GEN: Sofy Short appears well, alert and oriented x 3, pleasant and cooperative   NECK: supple, no carotid bruits, no JVD or HJR  HEART: normal rate, regular rhythm, normal S1 and S2, no murmurs, clicks, gallops or rubs   LUNGS: clear to auscultation bilaterally; no wheezes, rales, or rhonchi   EXTREMITIES: edema  Labs & Results:    No results displayed because visit has over 200 results  Cta Chest Wo W Contrast    Result Date: 4/7/2021  Narrative: CTA - CHEST WITH IV CONTRAST - PULMONARY ANGIOGRAM INDICATION: "r/o PE "  Postop excision of spinal mass on 4/6/2021  Difficult intubation and hypoxia throughout most of the case  History of coronary artery disease  COMPARISON: Chest radiograph from 4/6/2021 and abdomen CT from 2/22/2021  TECHNIQUE: CT angiogram timed for optimal opacification of the pulmonary arteries  Axial, sagittal, and coronal 2D reformats created from source data  Coronal 3D MIP postprocessing on the acquisition scanner  Radiation dose length product (DLP):  1594 69 mGy-cm   Radiation dose exposure minimized using iterative reconstruction and automated exposure control  IV Contrast:  100 mL of iohexol (OMNIPAQUE)  FINDINGS: PULMONARY ARTERIES:  With moderate compromised by respiratory motion producing artifactual filling defects in the vessels of the upper and mid lungs, no definite acute pulmonary embolus  LUNGS:  Evaluation mildly compromised by respiratory motion  Complete left lower lobe and partial right lower lobe atelectasis  Partial atelectasis of the right middle lobe  Triangular nonenhancing area of juxtapleural ground glass opacity in the posterior basal right lower lobe (2/130 - 153; 601/139)  AIRWAYS: No significant filling defects  PLEURA:  Unremarkable  HEART/GREAT VESSELS:  Mild cardiomegaly  Mild coronary artery calcification indicating atherosclerotic heart disease  MEDIASTINUM AND SLAVA:  ET tube 5 cm above the nicolasa  CHEST WALL AND LOWER NECK: 9 mm left thyroid nodule (2/18)  No follow-up is needed  UPPER ABDOMEN:  NG tube in stomach  OSSEOUS STRUCTURES:  Mild degenerative disease in the spine  Impression: With moderate compromise by respiratory motion, particularly in the upper lungs, no definite acute pulmonary embolus  Nonenhancing peripheral wedge-shaped area of groundglass opacity in the right lower lobe which has the appearance of a pulmonary infarct  Complete left lower lobe atelectasis and partial right lower lobe and right middle lobe atelectasis   Mild coronary artery calcification compatible with known coronary artery disease  I personally discussed this study with Eribertojaxon Booth on 4/7/2021 at 8:38 AM  Workstation performed: [id number]     Stat Cxr Icu    Result Date: 4/6/2021  Narrative: CHEST INDICATION:   ETT positioning  Patient has suspected COVID-19  COMPARISON:  11/29/2020 EXAM PERFORMED/VIEWS:  XR CHEST PORTABLE ICU FINDINGS:  Endotracheal tube is present, in satisfactory position with its tip above the level of the nicolasa  Enteric tube is present with its tip extending below the left hemidiaphragm  Cardiomediastinal silhouette appears unremarkable  Patchy densities at the right lung base noted  Osseous structures appear within normal limits for patient age  Impression: 1  Interval intubation  2   Right basilar opacities possibly atelectasis versus pneumonia or aspiration  Workstation performed: [id number]     Vas Lower Limb Venous Duplex Study, Complete Bilateral    Result Date: 4/7/2021  Narrative:  [facility] REPORT CLINICAL: Indications: Patient presents with left lower extremity pain s/p left thigh mass excision and recent pulmonary infarct  Patient is currently receiving Heparin  Operative History: Coronary angioplasty with stent placement Risk Factors The patient has history of Obesity, HTN, Diabetes, HLD, CAD, MI and smoking (current) 1-2 ppd  FINDINGS:  Segment       Right            Left                        Impression       Impression       CFV           Normal (Patent)  Normal (Patent)  GSV Inguinal  Normal (Patent)  Normal (Patent)     CONCLUSION:  Impression:  RIGHT LOWER LIMB: No evidence of acute or chronic deep vein thrombosis  No evidence of superficial thrombophlebitis noted  Doppler evaluation shows a normal response to augmentation maneuvers  Popliteal, posterior tibial and anterior tibial arterial Doppler waveforms are triphasic  LEFT LOWER LIMB: No evidence of acute or chronic deep vein thrombosis  No evidence of superficial thrombophlebitis noted   Doppler evaluation shows a normal response to augmentation maneuvers  Popliteal, posterior tibial and anterior tibial arterial Doppler waveforms are triphasic  Technical findings were faxed to chart  SIGNATURE: Electronically Signed by: Kaur Marx on 2021-04-07 05:32:49 PM    Us Bedside Procedure    Result Date: 4/6/2021  Narrative: 1 2 840 [id number] 2 323 [id number] 3    Us Bedside Procedure    Result Date: 4/6/2021  Narrative: 1 2 840 [id number] 2 323 [id number] 2      EKG personally reviewed by Gissell Bates MD      Counseling / Coordination of Care  Total floor / unit time spent today 30 minutes  Greater than 50% of total time was spent with the patient and / or family counseling and / or coordination of care

## 2021-04-23 NOTE — PROGRESS NOTES
Progress Note - General Surgery   Robert Carroll 45 y o  male MRN: [id number]  Unit/Bed#: [facility] 512-01 Encounter: [id number]    Assessment:  45 y o  M with L thigh soft tissue mass s/p excision and vac placement 4/6  S/p L thigh washout and vac placement 4/17  S/p L thigh washout, debridement 4/22    Afebrile  VSS    Plan:  Continue diabetic diet  Continue daily dressing changes  Prn analgesia - will attempt only po regimen today in anticipation of discharging home in the next 24 hrs   DVT ppx  OOB/Ambulate  CM for dispo planning    Subjective/Objective     Subjective: No acute events overnight  Complains of persistent L thigh pain  No fevers or chills  Objective:     Blood pressure 146/73, pulse 66, temperature 98 °F (36 7 °C), temperature source Oral, resp  rate 20, height 5' 6 75" (1 695 m), weight (!) 162 kg (356 lb 4 2 oz), SpO2 94 %  ,Body mass index is 56 22 kg/m²  Intake/Output Summary (Last 24 hours) at 4/23/2021 0735  Last data filed at 4/23/2021 0453  Gross per 24 hour   Intake 1180 ml   Output 1750 ml   Net -570 ml       Invasive Devices     Peripheral Intravenous Line            Peripheral IV 04/20/21 Dorsal (posterior); Right Hand 3 days                Physical Exam:   NAD, alert and oriented x3  Normocephalic, atraumatic  MMM, EOMI, PERRLA  Norm resp effort on RA  RRR  Abd soft, NT/ND  L groin wound with dressing in place   Ace wrap present along the length of the leg  No calf tenderness or peripheral edema  Motor/sensation intact in distal extremities  CN grossly intact  -rash/lesions      Lab, Imaging and other studies:  CBC:   Lab Results   Component Value Date    WBC 12 03 (H) 04/23/2021    HGB 8 8 (L) 04/23/2021    HCT 29 1 (L) 04/23/2021    MCV 90 04/23/2021     (H) 04/23/2021    MCH 27 2 04/23/2021    MCHC 30 2 (L) 04/23/2021    RDW 13 4 04/23/2021    MPV 9 0 04/23/2021   , CMP:   Lab Results   Component Value Date    SODIUM 137 04/23/2021    K 4 4 04/23/2021     04/23/2021    CO2 32 04/23/2021    BUN 17 04/23/2021    CREATININE 1 02 04/23/2021    CALCIUM 8 6 04/23/2021    EGFR 92 04/23/2021     VTE Pharmacologic Prophylaxis: Enoxaparin (Lovenox)  VTE Mechanical Prophylaxis: sequential compression device

## 2021-04-23 NOTE — PROGRESS NOTES
Progress Note - Acute Pain Service    Loralyn Gowers 45 y o  male MRN: [id number]  Unit/Bed#: [facility] 512-01 Encounter: [id number]      Assessment:   Principal Problem:    Left groin mass  Active Problems:    CAD (coronary artery disease)    Class 3 severe obesity due to excess calories with serious comorbidity and body mass index (BMI) of 50 0 to 59 9 in [facility])    Loralyn Gowers is a 45 y o  male  Admitted on 4/6/21 for mass excision of hidradenitis in the left proximal thigh with VAC dressing placement   Patient's pain has been difficult to control, therefore APS consulted  Plan:    Continue Tylenol 975 mg p o  q 8 hours scheduled   Continue Dilaudid 4 mg p o  q 4 hours p r n  moderate pain   Continue Dilaudid 6 mg p o  q 4 hours p r n  severe pain   Discontinue IV Dilaudid this morning   Continue Neurontin 200 mg p o  t i d  scheduled   Continue Robaxin 750 mg p o  q 6 hours scheduled   Continue bowel regimen to avoid opioid induced constipation   At discharge, suggest Dilaudid 4 mg tablets, 1 p o  q 3 hours p r n  moderate to severe pain   At discharge, continue Tylenol, Neurontin, Robaxin, bowel regimen as currently ordered  APS will continue to follow  Please call  / [phone] or [facility] (/ between [phone] and on weekends) with questions or concerns    Pain History  Current pain location(s):   Left leg  Pain Scale:    9/10  Quality:  burning  24 hour history:  Patient continues to have left lower extremity pain  Plan is to discharge in the next 24 hours  Opioid requirement previous 24 hours:   Dilaudid 36 mg p o , Dilaudid 4 mg IV      Meds/Allergies   all current active meds have been reviewed, current meds:   Current Facility-Administered Medications   Medication Dose Route Frequency    acetaminophen (TYLENOL) tablet 975 mg  975 mg Oral Q8H [address]    aspirin chewable tablet 81 mg  81 mg Oral Daily    atorvastatin (LIPITOR) tablet 80 mg  80 mg Oral Daily With Dinner    bisacodyl (DULCOLAX) EC tablet 5 mg  5 mg Oral Daily PRN    carvedilol (COREG) tablet 6 25 mg  6 25 mg Oral BID With Meals    chlorhexidine (PERIDEX) 0 12 % oral rinse 15 mL  15 mL Swish & Spit Q12H [address]    enoxaparin (LOVENOX) subcutaneous injection 60 mg  60 mg Subcutaneous Q12H [address]    fluconazole (DIFLUCAN) tablet 200 mg  200 mg Oral Daily    gabapentin (NEURONTIN) capsule 200 mg  200 mg Oral TID    glycopyrrolate (ROBINUL) injection 0 2 mg  0 2 mg Intravenous Q4H PRN    haloperidol lactate (HALDOL) injection 2 mg  2 mg Intramuscular Q30 Min PRN    HYDROmorphone (DILAUDID) tablet 4 mg  4 mg Oral Q4H PRN    Or    HYDROmorphone (DILAUDID) tablet 6 mg  6 mg Oral Q4H PRN    insulin lispro (HumaLOG) 100 units/mL subcutaneous injection 4-20 Units  4-20 Units Subcutaneous 4x Daily (AC & HS)    LORazepam (ATIVAN) injection 1 mg  1 mg Intravenous Q1H PRN    methocarbamol (ROBAXIN) tablet 750 mg  750 mg Oral Q6H SHAZIA    naloxone (NARCAN) injection 0 1 mg  0 1 mg Intravenous Q3 min PRN    nystatin (MYCOSTATIN) powder   Topical BID    polyethylene glycol (MIRALAX) packet 17 g  17 g Oral Daily    senna-docusate sodium (SENOKOT S) 8 6-50 mg per tablet 1 tablet  1 tablet Oral HS    sodium chloride (OCEAN) 0 65 % nasal spray 1 spray  1 spray Each Nare Q1H PRN    ticagrelor (BRILINTA) tablet 90 mg  90 mg Oral Q12H [address]    and PTA meds:   Prior to Admission Medications   Prescriptions Last Dose Informant Patient Reported? Taking?    Omega-3 Fatty Acids (fish oil) 1,000 mg 4/5/2021 at 0500  No Yes   Sig: Take 1 capsule (1,000 mg total) by mouth 2 (two) times a day   Ticagrelor (BRILINTA PO) 4/5/2021 at 0500  Yes Yes   Sig: Take by mouth   amLODIPine (NORVASC) 10 mg tablet 4/5/2021 at 0500  No Yes   Sig: take 1 tablet by mouth once daily   aspirin (ECOTRIN LOW STRENGTH) 81 mg EC tablet 4/1/2021  No Yes   Sig: Take 1 tablet (81 mg total) by mouth daily   atorvastatin (LIPITOR) 80 mg tablet 4/4/2021 at 1900  No Yes   Sig: Take 1 tablet (80 mg total) by mouth daily   carvedilol (COREG) 6 25 mg tablet 4/5/2021 at 0500  No Yes   Sig: Take 1 tablet (6 25 mg total) by mouth 2 (two) times a day with meals   clindamycin (CLEOCIN) 150 mg capsule 4/5/2021 at 1700  No Yes   Sig: Take 1 capsule (150 mg total) by mouth every 12 (twelve) hours for 6 days   glimepiride (AMARYL) 4 mg tablet 4/5/2021 at 0500  No Yes   Sig: Take 1 tablet (4 mg total) by mouth daily with breakfast   lisinopril (ZESTRIL) 10 mg tablet 4/5/2021 at 0500  No Yes   Sig: Take 1 tablet (10 mg total) by mouth daily   metFORMIN (GLUCOPHAGE) 1000 MG tablet 4/5/2021 at 0500  No Yes   Sig: Take 1 tablet (1,000 mg total) by mouth 2 (two) times a day with meals      Facility-Administered Medications: None       Allergies   Allergen Reactions    Amoxicillin Hives       Objective     Temp:  [97 8 °F (36 6 °C)-98 3 °F (36 8 °C)] 98 3 °F (36 8 °C)  HR:  [54-76] 62  Resp:  [14-22] 14  BP: (111-148)/(44-78) 111/44    Physical Exam  Vitals signs and nursing note reviewed  Constitutional:       General: He is awake  He is not in acute distress  Skin:     General: Skin is warm and dry  Neurological:      Mental Status: He is alert and oriented to person, place, and time  GCS: GCS eye subscore is 4  GCS verbal subscore is 5  GCS motor subscore is 6  Psychiatric:         Behavior: Behavior is cooperative  Lab Results:   Results from last 7 days   Lab Units 04/23/21  0455   WBC Thousand/uL 12 03*   HEMOGLOBIN g/dL 8 8*   HEMATOCRIT % 29 1*   PLATELETS Thousands/uL 554*      Results from last 7 days   Lab Units 04/23/21  0455   POTASSIUM mmol/L 4 4   CHLORIDE mmol/L 103   CO2 mmol/L 32   BUN mg/dL 17   CREATININE mg/dL 1 02   CALCIUM mg/dL 8 6       Imaging Studies: I have personally reviewed pertinent reports  EKG, Pathology, and Other Studies: I have personally reviewed pertinent reports        Counseling / Coordination of Care  Total floor / unit time spent today 30 minutes  Greater than 50% of total time was spent with the patient and / or family counseling and / or coordination of care  A description of the counseling / coordination of care:  Patient interview, physical examination, review of medical record, review of imaging and laboratory data, development of pain management plan, discussion of pain management plan with patient and primary service  Please note that the APS provides consultative services regarding pain management only  With the exception of ketamine and epidural infusions and except when indicated, final decisions regarding starting or changing doses of analgesic medications are at the discretion of the consulting service  Off hours consultation and/or medication management is generally not available      Dhaval Campo PA-C  Acute Pain Service

## 2021-04-23 NOTE — CASE MANAGEMENT
Call received from Sukhwinder Navarrete with [facility]  She requests that CM send message to them in [address] if patient is to dc home over the weekend

## 2021-04-23 NOTE — CASE MANAGEMENT
Met with patient who expects dc home today  He did not have STSG as planned and has no wound VAC  He will need VNA for RN and PT  Reviewed that [facility] has accepted referral and will see him at home  Confirmed with patient he does not want any dme at dc  Surgery to see patient and decide on dc

## 2021-04-23 NOTE — QUICK NOTE
Nurse-Patient-Provider rounds were completed with the patient's nurse today, Moira Ahumada and Sal Crowley  We discussed the plan is to change dressing of LLE daily  During dressing change today, after wound redressed, dressings soaked through with bloody discharge  Undressed wound again with notable bleeding peripherally along skin edge  Pressure applied for prolonged time without significant relief  Silver nitrate and Surgicell utilized in addition to pressure to stop superficial bleeding  Patient was appropriately medicated for interventions with 1mg of IV Dilaudid and 6mg PO Dilaudid  Plan is to monitor H&H tomorrow and continue dressing changes  Will re-evaluate wound tomorrow at bedside  Discharge will be delayed secondary to further wound care needed  We reviewed all of the invasive devices/lines/telemetry orders   - None  DVT Prophylaxis:  Lovenox    Pain Assessment / Plan:  - Continue current analgesic regimen  APS on board with maintaining and managing pain control  · Continue Tylenol 975 mg p o  q 8 hours scheduled  · Continue Dilaudid 4 mg p o  q 4 hours p r n  moderate pain  · Continue Dilaudid 6 mg p o  q 4 hours p r n  severe pain  · IV Dilaudid 0 5mg q 3 hours PRN for breakthrough pain  · Continue Neurontin 200 mg p o  t i d  scheduled  · Continue Robaxin 750 mg p o  q 6 hours scheduled  · Continue bowel regimen to avoid opioid induced constipation  Mobility Assessment / Plan:  - Activity as tolerated  Goals / Barriers for discharge:  Plan is to monitor H&H tomorrow and continue dressing changes  Will re-evaluate wound tomorrow at bedside  Discharge will be delayed secondary to further wound care needed  Case management following; case and discharge needs discussed  All questions and concerns were addressed  I spent greater than 45 minutes reviewing the plan with the patient and the nurse, and coordinating care for the day      Shirley Morley PA-C  4/23/2021

## 2021-04-24 LAB
ABO GROUP BLD: NORMAL
BASOPHILS # BLD AUTO: 0.05 THOUSANDS/ΜL (ref 0–0.1)
BASOPHILS NFR BLD AUTO: 1 % (ref 0–1)
BLD GP AB SCN SERPL QL: NEGATIVE
EOSINOPHIL # BLD AUTO: 0.34 THOUSAND/ΜL (ref 0–0.61)
EOSINOPHIL NFR BLD AUTO: 3 % (ref 0–6)
ERYTHROCYTE [DISTWIDTH] IN BLOOD BY AUTOMATED COUNT: 13.4 % (ref 11.6–15.1)
GLUCOSE SERPL-MCNC: 108 MG/DL (ref 65–140)
GLUCOSE SERPL-MCNC: 127 MG/DL (ref 65–140)
GLUCOSE SERPL-MCNC: 146 MG/DL (ref 65–140)
GLUCOSE SERPL-MCNC: 242 MG/DL (ref 65–140)
HCT VFR BLD AUTO: 24 % (ref 36.5–49.3)
HGB BLD-MCNC: 7.4 G/DL (ref 12–17)
IMM GRANULOCYTES # BLD AUTO: 0.06 THOUSAND/UL (ref 0–0.2)
IMM GRANULOCYTES NFR BLD AUTO: 1 % (ref 0–2)
LYMPHOCYTES # BLD AUTO: 2.16 THOUSANDS/ΜL (ref 0.6–4.47)
LYMPHOCYTES NFR BLD AUTO: 20 % (ref 14–44)
MCH RBC QN AUTO: 27.1 PG (ref 26.8–34.3)
MCHC RBC AUTO-ENTMCNC: 30.8 G/DL (ref 31.4–37.4)
MCV RBC AUTO: 88 FL (ref 82–98)
MONOCYTES # BLD AUTO: 0.83 THOUSAND/ΜL (ref 0.17–1.22)
MONOCYTES NFR BLD AUTO: 8 % (ref 4–12)
NEUTROPHILS # BLD AUTO: 7.43 THOUSANDS/ΜL (ref 1.85–7.62)
NEUTS SEG NFR BLD AUTO: 67 % (ref 43–75)
NRBC BLD AUTO-RTO: 0 /100 WBCS
PLATELET # BLD AUTO: 499 THOUSANDS/UL (ref 149–390)
PMV BLD AUTO: 8.9 FL (ref 8.9–12.7)
RBC # BLD AUTO: 2.73 MILLION/UL (ref 3.88–5.62)
RH BLD: POSITIVE
SPECIMEN EXPIRATION DATE: NORMAL
WBC # BLD AUTO: 10.87 THOUSAND/UL (ref 4.31–10.16)

## 2021-04-24 PROCEDURE — 99232 SBSQ HOSP IP/OBS MODERATE 35: CPT | Performed by: INTERNAL MEDICINE

## 2021-04-24 PROCEDURE — P9016 RBC LEUKOCYTES REDUCED: HCPCS

## 2021-04-24 PROCEDURE — 86923 COMPATIBILITY TEST ELECTRIC: CPT

## 2021-04-24 PROCEDURE — 99024 POSTOP FOLLOW-UP VISIT: CPT | Performed by: SURGERY

## 2021-04-24 PROCEDURE — 86850 RBC ANTIBODY SCREEN: CPT | Performed by: STUDENT IN AN ORGANIZED HEALTH CARE EDUCATION/TRAINING PROGRAM

## 2021-04-24 PROCEDURE — 82948 REAGENT STRIP/BLOOD GLUCOSE: CPT

## 2021-04-24 PROCEDURE — 85025 COMPLETE CBC W/AUTO DIFF WBC: CPT | Performed by: SURGERY

## 2021-04-24 PROCEDURE — 86900 BLOOD TYPING SEROLOGIC ABO: CPT | Performed by: STUDENT IN AN ORGANIZED HEALTH CARE EDUCATION/TRAINING PROGRAM

## 2021-04-24 PROCEDURE — 86901 BLOOD TYPING SEROLOGIC RH(D): CPT | Performed by: STUDENT IN AN ORGANIZED HEALTH CARE EDUCATION/TRAINING PROGRAM

## 2021-04-24 PROCEDURE — 30233N1 TRANSFUSION OF NONAUTOLOGOUS RED BLOOD CELLS INTO PERIPHERAL VEIN, PERCUTANEOUS APPROACH: ICD-10-PCS | Performed by: SURGERY

## 2021-04-24 RX ORDER — HYDROMORPHONE HCL/PF 1 MG/ML
1 SYRINGE (ML) INJECTION ONCE
Status: COMPLETED | OUTPATIENT
Start: 2021-04-24 | End: 2021-04-24

## 2021-04-24 RX ORDER — SODIUM CHLORIDE 9 MG/ML
125 INJECTION, SOLUTION INTRAVENOUS CONTINUOUS
Status: DISCONTINUED | OUTPATIENT
Start: 2021-04-25 | End: 2021-04-25

## 2021-04-24 RX ADMIN — TICAGRELOR 90 MG: 90 TABLET ORAL at 10:54

## 2021-04-24 RX ADMIN — INSULIN LISPRO 8 UNITS: 100 INJECTION, SOLUTION INTRAVENOUS; SUBCUTANEOUS at 12:12

## 2021-04-24 RX ADMIN — TICAGRELOR 90 MG: 90 TABLET ORAL at 22:12

## 2021-04-24 RX ADMIN — ACETAMINOPHEN 975 MG: 325 TABLET ORAL at 22:12

## 2021-04-24 RX ADMIN — HYDROMORPHONE HYDROCHLORIDE 1 MG: 1 INJECTION, SOLUTION INTRAMUSCULAR; INTRAVENOUS; SUBCUTANEOUS at 13:38

## 2021-04-24 RX ADMIN — ATORVASTATIN CALCIUM 80 MG: 80 TABLET, FILM COATED ORAL at 17:29

## 2021-04-24 RX ADMIN — CHLORHEXIDINE GLUCONATE 0.12% ORAL RINSE 15 ML: 1.2 LIQUID ORAL at 10:55

## 2021-04-24 RX ADMIN — CARVEDILOL 6.25 MG: 6.25 TABLET, FILM COATED ORAL at 10:54

## 2021-04-24 RX ADMIN — HYDROMORPHONE HYDROCHLORIDE 6 MG: 2 TABLET ORAL at 10:53

## 2021-04-24 RX ADMIN — ENOXAPARIN SODIUM 60 MG: 60 INJECTION SUBCUTANEOUS at 10:56

## 2021-04-24 RX ADMIN — NYSTATIN: 100000 POWDER TOPICAL at 17:29

## 2021-04-24 RX ADMIN — GABAPENTIN 200 MG: 100 CAPSULE ORAL at 10:54

## 2021-04-24 RX ADMIN — CHLORHEXIDINE GLUCONATE 0.12% ORAL RINSE 15 ML: 1.2 LIQUID ORAL at 22:11

## 2021-04-24 RX ADMIN — HYDROMORPHONE HYDROCHLORIDE 1 MG: 1 INJECTION, SOLUTION INTRAMUSCULAR; INTRAVENOUS; SUBCUTANEOUS at 15:26

## 2021-04-24 RX ADMIN — HYDROMORPHONE HYDROCHLORIDE 6 MG: 2 TABLET ORAL at 22:19

## 2021-04-24 RX ADMIN — GABAPENTIN 200 MG: 100 CAPSULE ORAL at 22:12

## 2021-04-24 RX ADMIN — METHOCARBAMOL TABLETS 750 MG: 750 TABLET, COATED ORAL at 12:12

## 2021-04-24 RX ADMIN — HYDROMORPHONE HYDROCHLORIDE 6 MG: 2 TABLET ORAL at 06:18

## 2021-04-24 RX ADMIN — HYDROMORPHONE HYDROCHLORIDE 6 MG: 2 TABLET ORAL at 17:28

## 2021-04-24 RX ADMIN — POLYETHYLENE GLYCOL 3350 17 G: 17 POWDER, FOR SOLUTION ORAL at 10:55

## 2021-04-24 RX ADMIN — METHOCARBAMOL TABLETS 750 MG: 750 TABLET, COATED ORAL at 17:29

## 2021-04-24 RX ADMIN — ACETAMINOPHEN 975 MG: 325 TABLET ORAL at 06:18

## 2021-04-24 RX ADMIN — DOCUSATE SODIUM AND SENNOSIDES 1 TABLET: 8.6; 5 TABLET ORAL at 22:13

## 2021-04-24 RX ADMIN — GABAPENTIN 200 MG: 100 CAPSULE ORAL at 17:29

## 2021-04-24 RX ADMIN — CARVEDILOL 6.25 MG: 6.25 TABLET, FILM COATED ORAL at 17:29

## 2021-04-24 RX ADMIN — ENOXAPARIN SODIUM 60 MG: 60 INJECTION SUBCUTANEOUS at 22:11

## 2021-04-24 RX ADMIN — NYSTATIN: 100000 POWDER TOPICAL at 10:56

## 2021-04-24 RX ADMIN — METHOCARBAMOL TABLETS 750 MG: 750 TABLET, COATED ORAL at 06:18

## 2021-04-24 RX ADMIN — FLUCONAZOLE 200 MG: 200 TABLET ORAL at 10:56

## 2021-04-24 RX ADMIN — ASPIRIN 81 MG: 81 TABLET, CHEWABLE ORAL at 10:55

## 2021-04-24 NOTE — PROGRESS NOTES
Pt stood up to go into BR, called nursing staff d/t bleeding  Noted to have large clot on bed pad and oozing from groin    Given IV dilaudid and dressing packed per Dr Eddy Ramirez of [facility]

## 2021-04-24 NOTE — PROGRESS NOTES
Cardiology Progress Note - Jacob Harris 45 y o  male MRN: [id number]    Unit/Bed#: [facility] 512-01 Encounter: [id number]      Assessment:  Principal Problem:    Left groin mass  Active Problems:    CAD (coronary artery disease)    Class 3 severe obesity due to excess calories with serious comorbidity and body mass index (BMI) of 50 0 to 59 9 in adult [facility])      Plan:  Patient with no chest pain or significant dyspnea  Surgical note appreciated  Patient received unit of packed red blood cells  Hemoglobin today 7 4  Vital signs and in particular heart rate well controlled  Will continue current cardiac regimen  Subjective:   Patient seen and examined  No significant events overnight   negative  Objective:     Vitals: Blood pressure 111/64, pulse 66, temperature 97 8 °F (36 6 °C), temperature source Oral, resp  rate 18, height 5' 6 75" (1 695 m), weight (!) 162 kg (356 lb 0 7 oz), SpO2 96 %  , Body mass index is 56 18 kg/m² ,   Orthostatic Blood Pressures      Most Recent Value   Blood Pressure  111/64 filed at 04/23/2021 2310   Patient Position - Orthostatic VS  Lying filed at 04/23/2021 2310      ,      Intake/Output Summary (Last 24 hours) at 4/24/2021 0948  Last data filed at 4/23/2021 2021  Gross per 24 hour   Intake 0 ml   Output 525 ml   Net -525 ml       No significant arrhythmias seen on telemetry review  Physical Exam:    GEN: Jacob Harris appears well, alert and oriented x 3, pleasant and cooperative   NECK: supple, no carotid bruits, no JVD or HJR  HEART: normal rate, regular rhythm, normal S1 and S2, no murmurs, clicks, gallops or rubs   LUNGS: clear to auscultation bilaterally; no wheezes, rales, or rhonchi   EXTREMITIES: edema      Labs & Results:    No results displayed because visit has over 200 results            Cta Chest Wo W Contrast    Result Date: 4/7/2021  Narrative: CTA - CHEST WITH IV CONTRAST - PULMONARY ANGIOGRAM INDICATION: "r/o PE "  Postop excision of spinal mass on 4/6/2021  Difficult intubation and hypoxia throughout most of the case  History of coronary artery disease  COMPARISON: Chest radiograph from 4/6/2021 and abdomen CT from 2/22/2021  TECHNIQUE: CT angiogram timed for optimal opacification of the pulmonary arteries  Axial, sagittal, and coronal 2D reformats created from source data  Coronal 3D MIP postprocessing on the acquisition scanner  Radiation dose length product (DLP):  1594 69 mGy-cm   Radiation dose exposure minimized using iterative reconstruction and automated exposure control  IV Contrast:  100 mL of iohexol (OMNIPAQUE)  FINDINGS: PULMONARY ARTERIES:  With moderate compromised by respiratory motion producing artifactual filling defects in the vessels of the upper and mid lungs, no definite acute pulmonary embolus  LUNGS:  Evaluation mildly compromised by respiratory motion  Complete left lower lobe and partial right lower lobe atelectasis  Partial atelectasis of the right middle lobe  Triangular nonenhancing area of juxtapleural ground glass opacity in the posterior basal right lower lobe (2/130 - 153; 601/139)  AIRWAYS: No significant filling defects  PLEURA:  Unremarkable  HEART/GREAT VESSELS:  Mild cardiomegaly  Mild coronary artery calcification indicating atherosclerotic heart disease  MEDIASTINUM AND SLAVA:  ET tube 5 cm above the nicolasa  CHEST WALL AND LOWER NECK: 9 mm left thyroid nodule (2/18)  No follow-up is needed  UPPER ABDOMEN:  NG tube in stomach  OSSEOUS STRUCTURES:  Mild degenerative disease in the spine  Impression: With moderate compromise by respiratory motion, particularly in the upper lungs, no definite acute pulmonary embolus  Nonenhancing peripheral wedge-shaped area of groundglass opacity in the right lower lobe which has the appearance of a pulmonary infarct  Complete left lower lobe atelectasis and partial right lower lobe and right middle lobe atelectasis   Mild coronary artery calcification compatible with known coronary artery disease  I personally discussed this study with Shirley Palomares on 4/7/2021 at 8:38 AM  Workstation performed: [id number]     Stat Cxr Icu    Result Date: 4/6/2021  Narrative: CHEST INDICATION:   ETT positioning  Patient has suspected COVID-19  COMPARISON:  11/29/2020 EXAM PERFORMED/VIEWS:  XR CHEST PORTABLE ICU FINDINGS:  Endotracheal tube is present, in satisfactory position with its tip above the level of the nicolasa  Enteric tube is present with its tip extending below the left hemidiaphragm  Cardiomediastinal silhouette appears unremarkable  Patchy densities at the right lung base noted  Osseous structures appear within normal limits for patient age  Impression: 1  Interval intubation  2   Right basilar opacities possibly atelectasis versus pneumonia or aspiration  Workstation performed: [id number]     Vas Lower Limb Venous Duplex Study, Complete Bilateral    Result Date: 4/7/2021  Narrative:  [facility] REPORT CLINICAL: Indications: Patient presents with left lower extremity pain s/p left thigh mass excision and recent pulmonary infarct  Patient is currently receiving Heparin  Operative History: Coronary angioplasty with stent placement Risk Factors The patient has history of Obesity, HTN, Diabetes, HLD, CAD, MI and smoking (current) 1-2 ppd  FINDINGS:  Segment       Right            Left                        Impression       Impression       CFV           Normal (Patent)  Normal (Patent)  GSV Inguinal  Normal (Patent)  Normal (Patent)     CONCLUSION:  Impression:  RIGHT LOWER LIMB: No evidence of acute or chronic deep vein thrombosis  No evidence of superficial thrombophlebitis noted  Doppler evaluation shows a normal response to augmentation maneuvers  Popliteal, posterior tibial and anterior tibial arterial Doppler waveforms are triphasic  LEFT LOWER LIMB: No evidence of acute or chronic deep vein thrombosis  No evidence of superficial thrombophlebitis noted   Doppler evaluation shows a normal response to augmentation maneuvers  Popliteal, posterior tibial and anterior tibial arterial Doppler waveforms are triphasic  Technical findings were faxed to chart  SIGNATURE: Electronically Signed by: Rimma Simmons on 2021-04-07 05:32:49 PM    Us Bedside Procedure    Result Date: 4/6/2021  Narrative: 1 2 840 [id number] 2 323 [id number] 3    Us Bedside Procedure    Result Date: 4/6/2021  Narrative: 1 2 840 [id number] 2 323 [id number] 2      EKG personally reviewed by Estephania Turner MD      Counseling / Coordination of Care  Total floor / unit time spent today 30 minutes  Greater than 50% of total time was spent with the patient and / or family counseling and / or coordination of care

## 2021-04-24 NOTE — PROGRESS NOTES
Progress Note - General Surgery   Unique Allenor 45 y o  male MRN: [id number]  Unit/Bed#: [facility] 512-01 Encounter: [id number]    Assessment:  45 y o  M with L thigh soft tissue mass s/p excision and vac placement 4/6  S/p L thigh washout and vac placement 4/17  S/p L thigh washout, debridement 4/22    Plan:  · Plan to transfuse 1u pRBC and monitor for response, ?d/c home 4/25 if good response and uneventful dressing change  · Continue diabetic diet  · Continue daily dressing changes, will change today at bedside  · DVT ppx  · OOB/Ambulate  · CM for dispo planning    Subjective/Objective     Subjective: Dressing needed to be reinforced overnight  Hgb continues to drift this AM  Otherwise patient doing well  Objective:     Blood pressure 111/64, pulse 66, temperature 97 8 °F (36 6 °C), temperature source Oral, resp  rate 18, height 5' 6 75" (1 695 m), weight (!) 162 kg (356 lb 0 7 oz), SpO2 96 %  ,Body mass index is 56 18 kg/m²  Intake/Output Summary (Last 24 hours) at 4/24/2021 0640  Last data filed at 4/23/2021 2021  Gross per 24 hour   Intake 360 ml   Output 525 ml   Net -165 ml       Invasive Devices     Peripheral Intravenous Line            Peripheral IV 04/23/21 Right Forearm 1 day                Physical Exam:   GEN: NAD  HEENT: MMM  CV: warm/well perfused  Lung: normal effort  Ab: Soft, NT/ND  Extrem: Left groin with some sanguinous strikethrough  Neuro:  A+Ox3, motor and sensation grossly intact      Lab, Imaging and other studies:  CBC:   Lab Results   Component Value Date    WBC 10 87 (H) 04/24/2021    HGB 7 4 (L) 04/24/2021    HCT 24 0 (L) 04/24/2021    MCV 88 04/24/2021     (H) 04/24/2021    MCH 27 1 04/24/2021    MCHC 30 8 (L) 04/24/2021    RDW 13 4 04/24/2021    MPV 8 9 04/24/2021    NRBC 0 04/24/2021   , CMP:   No results found for: SODIUM, K, CL, CO2, ANIONGAP, BUN, CREATININE, GLUCOSE, CALCIUM, AST, ALT, ALKPHOS, PROT, BILITOT, EGFR  VTE Pharmacologic Prophylaxis: Enoxaparin (Lovenox)  VTE Mechanical Prophylaxis: sequential compression device

## 2021-04-25 LAB
ABO GROUP BLD BPU: NORMAL
BASOPHILS # BLD AUTO: 0.05 THOUSANDS/ΜL (ref 0–0.1)
BASOPHILS NFR BLD AUTO: 1 % (ref 0–1)
BPU ID: NORMAL
CROSSMATCH: NORMAL
EOSINOPHIL # BLD AUTO: 0.34 THOUSAND/ΜL (ref 0–0.61)
EOSINOPHIL NFR BLD AUTO: 3 % (ref 0–6)
ERYTHROCYTE [DISTWIDTH] IN BLOOD BY AUTOMATED COUNT: 14.1 % (ref 11.6–15.1)
GLUCOSE SERPL-MCNC: 134 MG/DL (ref 65–140)
GLUCOSE SERPL-MCNC: 198 MG/DL (ref 65–140)
GLUCOSE SERPL-MCNC: 211 MG/DL (ref 65–140)
GLUCOSE SERPL-MCNC: 228 MG/DL (ref 65–140)
HCT VFR BLD AUTO: 27.9 % (ref 36.5–49.3)
HGB BLD-MCNC: 8.9 G/DL (ref 12–17)
IMM GRANULOCYTES # BLD AUTO: 0.07 THOUSAND/UL (ref 0–0.2)
IMM GRANULOCYTES NFR BLD AUTO: 1 % (ref 0–2)
LYMPHOCYTES # BLD AUTO: 2.07 THOUSANDS/ΜL (ref 0.6–4.47)
LYMPHOCYTES NFR BLD AUTO: 19 % (ref 14–44)
MCH RBC QN AUTO: 27.7 PG (ref 26.8–34.3)
MCHC RBC AUTO-ENTMCNC: 31.9 G/DL (ref 31.4–37.4)
MCV RBC AUTO: 87 FL (ref 82–98)
MONOCYTES # BLD AUTO: 0.95 THOUSAND/ΜL (ref 0.17–1.22)
MONOCYTES NFR BLD AUTO: 9 % (ref 4–12)
NEUTROPHILS # BLD AUTO: 7.55 THOUSANDS/ΜL (ref 1.85–7.62)
NEUTS SEG NFR BLD AUTO: 67 % (ref 43–75)
NRBC BLD AUTO-RTO: 0 /100 WBCS
PLATELET # BLD AUTO: 526 THOUSANDS/UL (ref 149–390)
PMV BLD AUTO: 9 FL (ref 8.9–12.7)
RBC # BLD AUTO: 3.21 MILLION/UL (ref 3.88–5.62)
UNIT DISPENSE STATUS: NORMAL
UNIT PRODUCT CODE: NORMAL
UNIT RH: NORMAL
WBC # BLD AUTO: 11.03 THOUSAND/UL (ref 4.31–10.16)

## 2021-04-25 PROCEDURE — 99232 SBSQ HOSP IP/OBS MODERATE 35: CPT | Performed by: INTERNAL MEDICINE

## 2021-04-25 PROCEDURE — 99024 POSTOP FOLLOW-UP VISIT: CPT | Performed by: SURGERY

## 2021-04-25 PROCEDURE — 85025 COMPLETE CBC W/AUTO DIFF WBC: CPT | Performed by: SURGERY

## 2021-04-25 PROCEDURE — 82948 REAGENT STRIP/BLOOD GLUCOSE: CPT

## 2021-04-25 RX ORDER — HYDROMORPHONE HCL/PF 1 MG/ML
1 SYRINGE (ML) INJECTION ONCE
Status: COMPLETED | OUTPATIENT
Start: 2021-04-25 | End: 2021-04-25

## 2021-04-25 RX ADMIN — CHLORHEXIDINE GLUCONATE 0.12% ORAL RINSE 15 ML: 1.2 LIQUID ORAL at 08:11

## 2021-04-25 RX ADMIN — HYDROMORPHONE HYDROCHLORIDE 1 MG: 1 INJECTION, SOLUTION INTRAMUSCULAR; INTRAVENOUS; SUBCUTANEOUS at 16:01

## 2021-04-25 RX ADMIN — NYSTATIN: 100000 POWDER TOPICAL at 17:12

## 2021-04-25 RX ADMIN — NYSTATIN: 100000 POWDER TOPICAL at 08:11

## 2021-04-25 RX ADMIN — GABAPENTIN 200 MG: 100 CAPSULE ORAL at 20:28

## 2021-04-25 RX ADMIN — HYDROMORPHONE HYDROCHLORIDE 6 MG: 2 TABLET ORAL at 15:07

## 2021-04-25 RX ADMIN — INSULIN LISPRO 8 UNITS: 100 INJECTION, SOLUTION INTRAVENOUS; SUBCUTANEOUS at 17:11

## 2021-04-25 RX ADMIN — METHOCARBAMOL TABLETS 750 MG: 750 TABLET, COATED ORAL at 00:02

## 2021-04-25 RX ADMIN — HYDROMORPHONE HYDROCHLORIDE 0.5 MG: 1 INJECTION, SOLUTION INTRAMUSCULAR; INTRAVENOUS; SUBCUTANEOUS at 22:55

## 2021-04-25 RX ADMIN — HYDROMORPHONE HYDROCHLORIDE 0.5 MG: 1 INJECTION, SOLUTION INTRAMUSCULAR; INTRAVENOUS; SUBCUTANEOUS at 08:08

## 2021-04-25 RX ADMIN — GABAPENTIN 200 MG: 100 CAPSULE ORAL at 17:12

## 2021-04-25 RX ADMIN — HYDROMORPHONE HYDROCHLORIDE 6 MG: 2 TABLET ORAL at 05:47

## 2021-04-25 RX ADMIN — ACETAMINOPHEN 975 MG: 325 TABLET ORAL at 13:11

## 2021-04-25 RX ADMIN — METHOCARBAMOL TABLETS 750 MG: 750 TABLET, COATED ORAL at 23:02

## 2021-04-25 RX ADMIN — ASPIRIN 81 MG: 81 TABLET, CHEWABLE ORAL at 08:10

## 2021-04-25 RX ADMIN — METHOCARBAMOL TABLETS 750 MG: 750 TABLET, COATED ORAL at 17:12

## 2021-04-25 RX ADMIN — ENOXAPARIN SODIUM 60 MG: 60 INJECTION SUBCUTANEOUS at 08:12

## 2021-04-25 RX ADMIN — HYDROMORPHONE HYDROCHLORIDE 6 MG: 2 TABLET ORAL at 11:43

## 2021-04-25 RX ADMIN — FLUCONAZOLE 200 MG: 200 TABLET ORAL at 08:12

## 2021-04-25 RX ADMIN — ACETAMINOPHEN 975 MG: 325 TABLET ORAL at 21:01

## 2021-04-25 RX ADMIN — TICAGRELOR 90 MG: 90 TABLET ORAL at 08:11

## 2021-04-25 RX ADMIN — HYDROMORPHONE HYDROCHLORIDE 0.5 MG: 1 INJECTION, SOLUTION INTRAMUSCULAR; INTRAVENOUS; SUBCUTANEOUS at 00:01

## 2021-04-25 RX ADMIN — METHOCARBAMOL TABLETS 750 MG: 750 TABLET, COATED ORAL at 05:47

## 2021-04-25 RX ADMIN — SODIUM CHLORIDE 125 ML/HR: 0.9 INJECTION, SOLUTION INTRAVENOUS at 00:02

## 2021-04-25 RX ADMIN — ACETAMINOPHEN 975 MG: 325 TABLET ORAL at 05:47

## 2021-04-25 RX ADMIN — ATORVASTATIN CALCIUM 80 MG: 80 TABLET, FILM COATED ORAL at 17:12

## 2021-04-25 RX ADMIN — METHOCARBAMOL TABLETS 750 MG: 750 TABLET, COATED ORAL at 11:43

## 2021-04-25 RX ADMIN — CHLORHEXIDINE GLUCONATE 0.12% ORAL RINSE 15 ML: 1.2 LIQUID ORAL at 20:28

## 2021-04-25 RX ADMIN — TICAGRELOR 90 MG: 90 TABLET ORAL at 20:28

## 2021-04-25 RX ADMIN — CARVEDILOL 6.25 MG: 6.25 TABLET, FILM COATED ORAL at 17:12

## 2021-04-25 RX ADMIN — HYDROMORPHONE HYDROCHLORIDE 6 MG: 2 TABLET ORAL at 20:54

## 2021-04-25 RX ADMIN — HYDROMORPHONE HYDROCHLORIDE 0.5 MG: 1 INJECTION, SOLUTION INTRAMUSCULAR; INTRAVENOUS; SUBCUTANEOUS at 13:15

## 2021-04-25 RX ADMIN — INSULIN LISPRO 8 UNITS: 100 INJECTION, SOLUTION INTRAVENOUS; SUBCUTANEOUS at 11:44

## 2021-04-25 RX ADMIN — ENOXAPARIN SODIUM 60 MG: 60 INJECTION SUBCUTANEOUS at 20:29

## 2021-04-25 RX ADMIN — CARVEDILOL 6.25 MG: 6.25 TABLET, FILM COATED ORAL at 08:11

## 2021-04-25 RX ADMIN — GABAPENTIN 200 MG: 100 CAPSULE ORAL at 08:11

## 2021-04-25 RX ADMIN — INSULIN LISPRO 4 UNITS: 100 INJECTION, SOLUTION INTRAVENOUS; SUBCUTANEOUS at 21:01

## 2021-04-25 NOTE — PROGRESS NOTES
Progress Note - General Surgery   David Gonzalez 45 y o  male MRN: [id number]  Unit/Bed#: [facility] 512-01 Encounter: [id number]    Assessment:  45 y o  M with L thigh soft tissue mass s/p excision and vac placement 4/6  S/p L thigh washout and vac placement 4/17  S/p L thigh washout, debridement 4/22    Plan:  · Good response to transfusion yesterday (2u pRBCs)  · Would change dressing at bedside, if no bleeding would plan on re-application of VAC tomorrow to be discharged home with [facility] as outpatient  · Continue diabetic diet  · DVT ppx  · OOB/Ambulate  · CM for dispo planning    Subjective/Objective     Subjective: Bled s/p dressing change requiring application of Jullie Generous  Remained dry afterwards  Received 2u pRBCs with good response  Pain well controlled  Objective:     Blood pressure 145/62, pulse 64, temperature 98 4 °F (36 9 °C), temperature source Oral, resp  rate 20, height 5' 6 75" (1 695 m), weight (!) 162 kg (356 lb 0 7 oz), SpO2 98 %  ,Body mass index is 56 18 kg/m²  Intake/Output Summary (Last 24 hours) at 4/25/2021 9045  Last data filed at 4/25/2021 0547  Gross per 24 hour   Intake 350 ml   Output 800 ml   Net -450 ml       Invasive Devices     Peripheral Intravenous Line            Peripheral IV 04/23/21 Right Forearm 2 days    Peripheral IV 04/24/21 Proximal;Right;Ventral (anterior) Forearm less than 1 day                Physical Exam:   GEN: NAD  HEENT: MMM  CV: warm/well perfused   Lung: normal effort  Ab: Soft, NT/ND  Extrem: left groin with sang drainage on gauze  Neuro:  A+Ox3, motor and sensation grossly intact      Lab, Imaging and other studies:  CBC:   Lab Results   Component Value Date    WBC 11 03 (H) 04/25/2021    HGB 8 9 (L) 04/25/2021    HCT 27 9 (L) 04/25/2021    MCV 87 04/25/2021     (H) 04/25/2021    MCH 27 7 04/25/2021    MCHC 31 9 04/25/2021    RDW 14 1 04/25/2021    MPV 9 0 04/25/2021    NRBC 0 04/25/2021   , CMP:   No results found for: SODIUM, K, CL, CO2, ANIONGAP, BUN, CREATININE, GLUCOSE, CALCIUM, AST, ALT, ALKPHOS, PROT, BILITOT, EGFR  VTE Pharmacologic Prophylaxis: Enoxaparin (Lovenox)  VTE Mechanical Prophylaxis: sequential compression device

## 2021-04-25 NOTE — PROGRESS NOTES
Cardiology Progress Note - Crystal Boone 45 y o  male MRN: [id number]    Unit/Bed#: [facility] 512-01 Encounter: [id number]      Assessment:  Principal Problem:    Left groin mass  Active Problems:    CAD (coronary artery disease)    Class 3 severe obesity due to excess calories with serious comorbidity and body mass index (BMI) of 50 0 to 59 9 in adult [facility])      Plan:  Patient with no issues overnight  He has no chest pain or significant dyspnea  Vital signs are stable  Pulse is regular  Hemoglobin today 8 9  Surgical note appreciated  Patient is stable from a cardiac point of view  Agree with his current therapy  Patient with prior history of right coronary artery PCI December 1, 2020  Patient continues on dual anti-platelet therapy  Please call if I can be of further assistance  He will follow up with his primary cardiologist as an outpatient  Subjective:   Patient seen and examined  No significant events overnight   negative  Objective:     Vitals: Blood pressure 145/62, pulse 64, temperature 98 4 °F (36 9 °C), temperature source Oral, resp  rate 20, height 5' 6 75" (1 695 m), weight (!) 162 kg (356 lb 0 7 oz), SpO2 98 %  , Body mass index is 56 18 kg/m² ,   Orthostatic Blood Pressures      Most Recent Value   Blood Pressure  145/62 filed at 04/25/2021 0700   Patient Position - Orthostatic VS  Lying filed at 04/24/2021 2219      ,      Intake/Output Summary (Last 24 hours) at 4/25/2021 0957  Last data filed at 4/25/2021 0900  Gross per 24 hour   Intake 1030 ml   Output 800 ml   Net 230 ml             Physical Exam:    GEN: Crystal Boone appears well, alert and oriented x 3, pleasant and cooperative   NECK: supple, no carotid bruits, no JVD or HJR  HEART: normal rate, regular rhythm, normal S1 and S2, no murmurs, clicks, gallops or rubs   LUNGS: clear to auscultation bilaterally; no wheezes, rales, or rhonchi   EXTREMITIES: edema    Labs & Results:    No results displayed because visit has over 200 results  Cta Chest Wo W Contrast    Result Date: 4/7/2021  Narrative: CTA - CHEST WITH IV CONTRAST - PULMONARY ANGIOGRAM INDICATION: "r/o PE "  Postop excision of spinal mass on 4/6/2021  Difficult intubation and hypoxia throughout most of the case  History of coronary artery disease  COMPARISON: Chest radiograph from 4/6/2021 and abdomen CT from 2/22/2021  TECHNIQUE: CT angiogram timed for optimal opacification of the pulmonary arteries  Axial, sagittal, and coronal 2D reformats created from source data  Coronal 3D MIP postprocessing on the acquisition scanner  Radiation dose length product (DLP):  1594 69 mGy-cm   Radiation dose exposure minimized using iterative reconstruction and automated exposure control  IV Contrast:  100 mL of iohexol (OMNIPAQUE)  FINDINGS: PULMONARY ARTERIES:  With moderate compromised by respiratory motion producing artifactual filling defects in the vessels of the upper and mid lungs, no definite acute pulmonary embolus  LUNGS:  Evaluation mildly compromised by respiratory motion  Complete left lower lobe and partial right lower lobe atelectasis  Partial atelectasis of the right middle lobe  Triangular nonenhancing area of juxtapleural ground glass opacity in the posterior basal right lower lobe (2/130 - 153; 601/139)  AIRWAYS: No significant filling defects  PLEURA:  Unremarkable  HEART/GREAT VESSELS:  Mild cardiomegaly  Mild coronary artery calcification indicating atherosclerotic heart disease  MEDIASTINUM AND SLAVA:  ET tube 5 cm above the nicolasa  CHEST WALL AND LOWER NECK: 9 mm left thyroid nodule (2/18)  No follow-up is needed  UPPER ABDOMEN:  NG tube in stomach  OSSEOUS STRUCTURES:  Mild degenerative disease in the spine  Impression: With moderate compromise by respiratory motion, particularly in the upper lungs, no definite acute pulmonary embolus   Nonenhancing peripheral wedge-shaped area of groundglass opacity in the right lower lobe which has the appearance of a pulmonary infarct  Complete left lower lobe atelectasis and partial right lower lobe and right middle lobe atelectasis  Mild coronary artery calcification compatible with known coronary artery disease  I personally discussed this study with Kenneth Quezada on 4/7/2021 at 8:38 AM  Workstation performed: [id number]     Stat Cxr Icu    Result Date: 4/6/2021  Narrative: CHEST INDICATION:   ETT positioning  Patient has suspected COVID-19  COMPARISON:  11/29/2020 EXAM PERFORMED/VIEWS:  XR CHEST PORTABLE ICU FINDINGS:  Endotracheal tube is present, in satisfactory position with its tip above the level of the nicolasa  Enteric tube is present with its tip extending below the left hemidiaphragm  Cardiomediastinal silhouette appears unremarkable  Patchy densities at the right lung base noted  Osseous structures appear within normal limits for patient age  Impression: 1  Interval intubation  2   Right basilar opacities possibly atelectasis versus pneumonia or aspiration  Workstation performed: [id number]     Vas Lower Limb Venous Duplex Study, Complete Bilateral    Result Date: 4/7/2021  Narrative:  [facility] REPORT CLINICAL: Indications: Patient presents with left lower extremity pain s/p left thigh mass excision and recent pulmonary infarct  Patient is currently receiving Heparin  Operative History: Coronary angioplasty with stent placement Risk Factors The patient has history of Obesity, HTN, Diabetes, HLD, CAD, MI and smoking (current) 1-2 ppd  FINDINGS:  Segment       Right            Left                        Impression       Impression       CFV           Normal (Patent)  Normal (Patent)  GSV Inguinal  Normal (Patent)  Normal (Patent)     CONCLUSION:  Impression:  RIGHT LOWER LIMB: No evidence of acute or chronic deep vein thrombosis  No evidence of superficial thrombophlebitis noted  Doppler evaluation shows a normal response to augmentation maneuvers   Popliteal, posterior tibial and anterior tibial arterial Doppler waveforms are triphasic  LEFT LOWER LIMB: No evidence of acute or chronic deep vein thrombosis  No evidence of superficial thrombophlebitis noted  Doppler evaluation shows a normal response to augmentation maneuvers  Popliteal, posterior tibial and anterior tibial arterial Doppler waveforms are triphasic  Technical findings were faxed to chart  SIGNATURE: Electronically Signed by: Miah Valdez on 2021-04-07 05:32:49 PM    Us Bedside Procedure    Result Date: 4/6/2021  Narrative: 1 2 840 [id number] 2 323 [id number] 3    Us Bedside Procedure    Result Date: 4/6/2021  Narrative: 1 2 840 [id number] 2 323 [id number] 2      EKG personally reviewed by Liliya Castro MD      Counseling / Coordination of Care  Total floor / unit time spent today 30 minutes  Greater than 50% of total time was spent with the patient and / or family counseling and / or coordination of care

## 2021-04-25 NOTE — PLAN OF CARE
Problem: Prexisting or High Potential for Compromised Skin Integrity  Goal: Skin integrity is maintained or improved  Description: INTERVENTIONS:  - Identify patients at risk for skin breakdown  - Assess and monitor skin integrity  - Assess and monitor nutrition and hydration status  - Monitor labs   - Assess for incontinence   - Turn and reposition patient  - Assist with mobility/ambulation  - Relieve pressure over bony prominences  - Avoid friction and shearing  - Provide appropriate hygiene as needed including keeping skin clean and dry  - Evaluate need for skin moisturizer/barrier cream  - Collaborate with interdisciplinary team   - Patient/family teaching  - Consider wound care consult   Outcome: Progressing     Problem: SAFETY,RESTRAINT: NV/NON-SELF DESTRUCTIVE BEHAVIOR  Goal: Remains free of harm/injury (restraint for non violent/non self-detsructive behavior)  Description: INTERVENTIONS:  - Instruct patient/family regarding restraint use   - Assess and monitor physiologic and psychological status   - Provide interventions and comfort measures to meet assessed patient needs   - Identify and implement measures to help patient regain control  - Assess readiness for release of restraint   Outcome: Progressing  Goal: Returns to optimal restraint-free functioning  Description: INTERVENTIONS:  - Assess the patient's behavior and symptoms that indicate continued need for restraint  - Identify and implement measures to help patient regain control  - Assess readiness for release of restraint   Outcome: Progressing     Problem: PAIN - ADULT  Goal: Verbalizes/displays adequate comfort level or baseline comfort level  Description: Interventions:  - Encourage patient to monitor pain and request assistance  - Assess pain using appropriate pain scale  - Administer analgesics based on type and severity of pain and evaluate response  - Implement non-pharmacological measures as appropriate and evaluate response  - Consider cultural and social influences on pain and pain management  - Notify physician/advanced practitioner if interventions unsuccessful or patient reports new pain  Outcome: Progressing     Problem: INFECTION - ADULT  Goal: Absence or prevention of progression during hospitalization  Description: INTERVENTIONS:  - Assess and monitor for signs and symptoms of infection  - Monitor lab/diagnostic results  - Monitor all insertion sites, i e  indwelling lines, tubes, and drains  - Monitor endotracheal if appropriate and nasal secretions for changes in amount and color  - [place] appropriate cooling/warming therapies per order  - Administer medications as ordered  - Instruct and encourage patient and family to use good hand hygiene technique  - Identify and instruct in appropriate isolation precautions for identified infection/condition  Outcome: Progressing     Problem: SAFETY ADULT  Goal: Patient will remain free of falls  Description: INTERVENTIONS:  - Assess patient frequently for physical needs  -  Identify cognitive and physical deficits and behaviors that affect risk of falls    -  [place] fall precautions as indicated by assessment   - Educate patient/family on patient safety including physical limitations  - Instruct patient to call for assistance with activity based on assessment  - Modify environment to reduce risk of injury  - Consider OT/PT consult to assist with strengthening/mobility  Outcome: Progressing  Goal: Maintain or return to baseline ADL function  Description: INTERVENTIONS:  -  Assess patient's ability to carry out ADLs; assess patient's baseline for ADL function and identify physical deficits which impact ability to perform ADLs (bathing, care of mouth/teeth, toileting, grooming, dressing, etc )  - Assess/evaluate cause of self-care deficits   - Assess range of motion  - Assess patient's mobility; develop plan if impaired  - Assess patient's need for assistive devices and provide as appropriate  - Encourage maximum independence but intervene and supervise when necessary  - Involve family in performance of ADLs  - Assess for home care needs following discharge   - Consider OT consult to assist with ADL evaluation and planning for discharge  - Provide patient education as appropriate  Outcome: Progressing  Goal: Maintain or return mobility status to optimal level  Description: INTERVENTIONS:  - Assess patient's baseline mobility status (ambulation, transfers, stairs, etc )    - Identify cognitive and physical deficits and behaviors that affect mobility  - Identify mobility aids required to assist with transfers and/or ambulation (gait belt, sit-to-stand, lift, walker, cane, etc )  - [place] fall precautions as indicated by assessment  - Record patient progress and toleration of activity level on Mobility SBAR; progress patient to next Phase/Stage  - Instruct patient to call for assistance with activity based on assessment  - Consider rehabilitation consult to assist with strengthening/weightbearing, etc   Outcome: Progressing     Problem: DISCHARGE PLANNING  Goal: Discharge to home or other facility with appropriate resources  Description: INTERVENTIONS:  - Identify barriers to discharge w/patient and caregiver  - Arrange for needed discharge resources and transportation as appropriate  - Identify discharge learning needs (meds, wound care, etc )  - Arrange for interpretive services to assist at discharge as needed  - Refer to Case Management Department for coordinating discharge planning if the patient needs post-hospital services based on physician/advanced practitioner order or complex needs related to functional status, cognitive ability, or social support system  Outcome: Progressing     Problem: Knowledge Deficit  Goal: Patient/family/caregiver demonstrates understanding of disease process, treatment plan, medications, and discharge instructions  Description: Complete learning assessment and assess knowledge base  Interventions:  - Provide teaching at level of understanding  - Provide teaching via preferred learning methods  Outcome: Progressing     Problem: CARDIOVASCULAR - ADULT  Goal: Maintains optimal cardiac output and hemodynamic stability  Description: INTERVENTIONS:  - Monitor I/O, vital signs and rhythm  - Monitor for S/S and trends of decreased cardiac output  - Administer and titrate ordered vasoactive medications to optimize hemodynamic stability  - Assess quality of pulses, skin color and temperature  - Assess for signs of decreased coronary artery perfusion  - Instruct patient to report change in severity of symptoms  Outcome: Progressing  Goal: Absence of cardiac dysrhythmias or at baseline rhythm  Description: INTERVENTIONS:  - Continuous cardiac monitoring, vital signs, obtain 12 lead EKG if ordered  - Administer antiarrhythmic and heart rate control medications as ordered  - Monitor electrolytes and administer replacement therapy as ordered  Outcome: Progressing     Problem: RESPIRATORY - ADULT  Goal: Achieves optimal ventilation and oxygenation  Description: INTERVENTIONS:  - Assess for changes in respiratory status  - Assess for changes in mentation and behavior  - Position to facilitate oxygenation and minimize respiratory effort  - Oxygen administered by appropriate delivery if ordered  - Initiate smoking cessation education as indicated  - Encourage broncho-pulmonary hygiene including cough, deep breathe, Incentive Spirometry  - Assess the need for suctioning and aspirate as needed  - Assess and instruct to report SOB or any respiratory difficulty  - Respiratory Therapy support as indicated  Outcome: Progressing     Problem: Potential for Falls  Goal: Patient will remain free of falls  Description: INTERVENTIONS:  - Assess patient frequently for physical needs  -  Identify cognitive and physical deficits and behaviors that affect risk of falls    -  [place] fall precautions as indicated by assessment   - Educate patient/family on patient safety including physical limitations  - Instruct patient to call for assistance with activity based on assessment  - Modify environment to reduce risk of injury  - Consider OT/PT consult to assist with strengthening/mobility  Outcome: Progressing     Problem: Nutrition/Hydration-ADULT  Goal: Nutrient/Hydration intake appropriate for improving, restoring or maintaining nutritional needs  Description: Monitor and assess patient's nutrition/hydration status for malnutrition  Collaborate with interdisciplinary team and initiate plan and interventions as ordered  Monitor patient's weight and dietary intake as ordered or per policy  Utilize nutrition screening tool and intervene as necessary  Determine patient's food preferences and provide high-protein, high-caloric foods as appropriate       INTERVENTIONS:  - Monitor oral intake, urinary output, labs, and treatment plans  - Assess nutrition and hydration status and recommend course of action  - Evaluate amount of meals eaten  - Assist patient with eating if necessary   - Allow adequate time for meals  - Recommend/ encourage appropriate diets, oral nutritional supplements, and vitamin/mineral supplements  - Order, calculate, and assess calorie counts as needed  - Recommend, monitor, and adjust tube feedings and TPN/PPN based on assessed needs  - Assess need for intravenous fluids  - Provide specific nutrition/hydration education as appropriate  - Include patient/family/caregiver in decisions related to nutrition  Outcome: Progressing

## 2021-04-26 LAB
BASOPHILS # BLD AUTO: 0.05 THOUSANDS/ΜL (ref 0–0.1)
BASOPHILS NFR BLD AUTO: 1 % (ref 0–1)
EOSINOPHIL # BLD AUTO: 0.38 THOUSAND/ΜL (ref 0–0.61)
EOSINOPHIL NFR BLD AUTO: 4 % (ref 0–6)
ERYTHROCYTE [DISTWIDTH] IN BLOOD BY AUTOMATED COUNT: 14.1 % (ref 11.6–15.1)
GLUCOSE SERPL-MCNC: 130 MG/DL (ref 65–140)
GLUCOSE SERPL-MCNC: 164 MG/DL (ref 65–140)
GLUCOSE SERPL-MCNC: 200 MG/DL (ref 65–140)
GLUCOSE SERPL-MCNC: 237 MG/DL (ref 65–140)
HCT VFR BLD AUTO: 26.6 % (ref 36.5–49.3)
HGB BLD-MCNC: 8.3 G/DL (ref 12–17)
IMM GRANULOCYTES # BLD AUTO: 0.07 THOUSAND/UL (ref 0–0.2)
IMM GRANULOCYTES NFR BLD AUTO: 1 % (ref 0–2)
LYMPHOCYTES # BLD AUTO: 1.72 THOUSANDS/ΜL (ref 0.6–4.47)
LYMPHOCYTES NFR BLD AUTO: 16 % (ref 14–44)
MCH RBC QN AUTO: 27.4 PG (ref 26.8–34.3)
MCHC RBC AUTO-ENTMCNC: 31.2 G/DL (ref 31.4–37.4)
MCV RBC AUTO: 88 FL (ref 82–98)
MONOCYTES # BLD AUTO: 0.73 THOUSAND/ΜL (ref 0.17–1.22)
MONOCYTES NFR BLD AUTO: 7 % (ref 4–12)
NEUTROPHILS # BLD AUTO: 7.69 THOUSANDS/ΜL (ref 1.85–7.62)
NEUTS SEG NFR BLD AUTO: 71 % (ref 43–75)
NRBC BLD AUTO-RTO: 0 /100 WBCS
PLATELET # BLD AUTO: 504 THOUSANDS/UL (ref 149–390)
PMV BLD AUTO: 9 FL (ref 8.9–12.7)
RBC # BLD AUTO: 3.03 MILLION/UL (ref 3.88–5.62)
WBC # BLD AUTO: 10.64 THOUSAND/UL (ref 4.31–10.16)

## 2021-04-26 PROCEDURE — 85025 COMPLETE CBC W/AUTO DIFF WBC: CPT | Performed by: STUDENT IN AN ORGANIZED HEALTH CARE EDUCATION/TRAINING PROGRAM

## 2021-04-26 PROCEDURE — 99232 SBSQ HOSP IP/OBS MODERATE 35: CPT | Performed by: PHYSICIAN ASSISTANT

## 2021-04-26 PROCEDURE — 82948 REAGENT STRIP/BLOOD GLUCOSE: CPT

## 2021-04-26 PROCEDURE — 99024 POSTOP FOLLOW-UP VISIT: CPT | Performed by: SURGERY

## 2021-04-26 RX ORDER — HYDROMORPHONE HCL/PF 1 MG/ML
1 SYRINGE (ML) INJECTION ONCE
Status: COMPLETED | OUTPATIENT
Start: 2021-04-26 | End: 2021-04-26

## 2021-04-26 RX ADMIN — METHOCARBAMOL TABLETS 750 MG: 750 TABLET, COATED ORAL at 17:11

## 2021-04-26 RX ADMIN — HYDROMORPHONE HYDROCHLORIDE 6 MG: 2 TABLET ORAL at 22:10

## 2021-04-26 RX ADMIN — NYSTATIN: 100000 POWDER TOPICAL at 09:21

## 2021-04-26 RX ADMIN — HYDROMORPHONE HYDROCHLORIDE 0.5 MG: 1 INJECTION, SOLUTION INTRAMUSCULAR; INTRAVENOUS; SUBCUTANEOUS at 23:16

## 2021-04-26 RX ADMIN — GABAPENTIN 200 MG: 100 CAPSULE ORAL at 22:08

## 2021-04-26 RX ADMIN — INSULIN LISPRO 4 UNITS: 100 INJECTION, SOLUTION INTRAVENOUS; SUBCUTANEOUS at 22:13

## 2021-04-26 RX ADMIN — ACETAMINOPHEN 975 MG: 325 TABLET ORAL at 13:25

## 2021-04-26 RX ADMIN — TICAGRELOR 90 MG: 90 TABLET ORAL at 22:09

## 2021-04-26 RX ADMIN — ENOXAPARIN SODIUM 60 MG: 60 INJECTION SUBCUTANEOUS at 22:12

## 2021-04-26 RX ADMIN — METHOCARBAMOL TABLETS 750 MG: 750 TABLET, COATED ORAL at 23:13

## 2021-04-26 RX ADMIN — ACETAMINOPHEN 975 MG: 325 TABLET ORAL at 22:09

## 2021-04-26 RX ADMIN — CARVEDILOL 6.25 MG: 6.25 TABLET, FILM COATED ORAL at 17:11

## 2021-04-26 RX ADMIN — METHOCARBAMOL TABLETS 750 MG: 750 TABLET, COATED ORAL at 05:24

## 2021-04-26 RX ADMIN — ACETAMINOPHEN 975 MG: 325 TABLET ORAL at 05:24

## 2021-04-26 RX ADMIN — ENOXAPARIN SODIUM 60 MG: 60 INJECTION SUBCUTANEOUS at 09:20

## 2021-04-26 RX ADMIN — TICAGRELOR 90 MG: 90 TABLET ORAL at 09:19

## 2021-04-26 RX ADMIN — ATORVASTATIN CALCIUM 80 MG: 80 TABLET, FILM COATED ORAL at 17:11

## 2021-04-26 RX ADMIN — GABAPENTIN 200 MG: 100 CAPSULE ORAL at 09:19

## 2021-04-26 RX ADMIN — NYSTATIN: 100000 POWDER TOPICAL at 17:11

## 2021-04-26 RX ADMIN — METHOCARBAMOL TABLETS 750 MG: 750 TABLET, COATED ORAL at 12:05

## 2021-04-26 RX ADMIN — INSULIN LISPRO 4 UNITS: 100 INJECTION, SOLUTION INTRAVENOUS; SUBCUTANEOUS at 17:10

## 2021-04-26 RX ADMIN — CHLORHEXIDINE GLUCONATE 0.12% ORAL RINSE 15 ML: 1.2 LIQUID ORAL at 09:19

## 2021-04-26 RX ADMIN — ASPIRIN 81 MG: 81 TABLET, CHEWABLE ORAL at 09:19

## 2021-04-26 RX ADMIN — HYDROMORPHONE HYDROCHLORIDE 1 MG: 1 INJECTION, SOLUTION INTRAMUSCULAR; INTRAVENOUS; SUBCUTANEOUS at 13:25

## 2021-04-26 RX ADMIN — FLUCONAZOLE 200 MG: 200 TABLET ORAL at 09:20

## 2021-04-26 RX ADMIN — CHLORHEXIDINE GLUCONATE 0.12% ORAL RINSE 15 ML: 1.2 LIQUID ORAL at 22:09

## 2021-04-26 RX ADMIN — DOCUSATE SODIUM AND SENNOSIDES 1 TABLET: 8.6; 5 TABLET ORAL at 22:09

## 2021-04-26 RX ADMIN — INSULIN LISPRO 8 UNITS: 100 INJECTION, SOLUTION INTRAVENOUS; SUBCUTANEOUS at 12:05

## 2021-04-26 RX ADMIN — CARVEDILOL 6.25 MG: 6.25 TABLET, FILM COATED ORAL at 09:19

## 2021-04-26 RX ADMIN — GABAPENTIN 200 MG: 100 CAPSULE ORAL at 17:11

## 2021-04-26 NOTE — UTILIZATION REVIEW
Elective Surgical Continued Stay Review    Date: 4/26/21 Monday      POD # 20                    Current Patient Class: Inpatient  Current Level of Care: Acute Med Surg since 4/10/21     HPI:  45year old male - initial surgery date 4/6/21 s/p mass excision hidradenitis L prox thigh with VAC placed  Post Op - hypoxic in acute respiratory failure postop, thus remained intubated due to intraoperative bronchospasms  4/7 Extubated    - 4/8 on 10 L NC O2  4/9 - 4/13 O2 weaned to Room Air      4/17/21   SURGERY DATE: 4/17/21  Preop + Post-Op Diagnosis Codes:     * Skin lesion of left leg [L98 9]   Cutaneous abscess of groin [L02 214]  Procedure(s) (LRB):  DEBRIDEMENT LOWER EXTREMITY [facility] OUT), left groin and thigh (Left)    4/19 continues to complain of severe pain despite Acute Pain Management continues    4/22   SURGERY DATE: 4/22/2021  Preop + Post-Op Diagnosis Codes:     * Skin lesion of left leg [L98 9]   Procedure(s) (LRB):  DEBRIDEMENT LOWER EXTREMITY [facility] OUT), left groin and thigh (Left)    4/26 Gen Surgery:  Assessment:  45 y o  M with L thigh soft tissue mass s/p excision and vac placement 4/6  S/p L thigh washout and vac placement 4/17  S/p L thigh washout, debridement 4/22     Plan:   · Would replace wound vac, obtain VAC paperwork and plan for home with outpatient VAC/[place] therapy  · Continue diabetic diet  · DVT ppx  · OOB/Ambulate    4/26 Acute Pain service:  Patient's pain has been difficult to control, therefore APS consulted  Pain History  Current pain location(s):   Left  leg  Pain Scale:    7/10  Quality:  Burning, sharp  24 hour history:  Patient continues to complain of left leg pain at incision site    Opioid requirement previous 24 hours:   Dilaudid 18 mg p o , Dilaudid 2 mg IV       Plan:   · Continue Tylenol 975 mg p o  q 8 hours scheduled  · Continue Dilaudid 4 mg p o  q 4 hours p r n  moderate pain  · Continue Dilaudid 6 mg p o  q 4 hours p r n  severe pain      · Continue Dilaudid 0 5 mg IV q 3 hours p r n  breakthrough pain  If being discharged today, discontinue IV Dilaudid  · Continue Neurontin 200 mg p o  t i d  scheduled  · Continue Robaxin 750 mg p o  q 6 hours scheduled  · Continue bowel regimen to avoid opioid induced constipation  Vital Signs:   Blood pressure 144/71, pulse 68, temperature 98 2 °F (36 8 °C), temperature source Oral, resp   rate 16,   SpO2 96 %  height 5' 6 75" (1 695 m), weight (!) 162 kg (356 lb 0 7 oz), Body mass index is 56 18 kg/m²       Intake/Output Summary (Last 24 hours) at 4/26/2021 0623:  Gross per 24 hour   Intake 980 ml   Output 2100 ml   Net -1120 ml      Pertinent Labs/Diagnostic Results:     Results from last 7 days   Lab Units 04/26/21  0542 04/25/21  0511 04/24/21  0302 04/23/21  0455 04/22/21  0607   WBC Thousand/uL 10 64* 11 03* 10 87* 12 03* 10 50*   HEMOGLOBIN g/dL 8 3* 8 9* 7 4* 8 8* 9 7*   HEMATOCRIT % 26 6* 27 9* 24 0* 29 1* 31 2*   PLATELETS Thousands/uL 504* 526* 499* 554* 569*   NEUTROS ABS Thousands/µL 7 69* 7 55 7 43  --  7 39       Results from last 7 days   Lab Units 04/23/21  0455 04/22/21  0607 04/20/21  0557   SODIUM mmol/L 137 139 138   POTASSIUM mmol/L 4 4 4 1 4 1   CHLORIDE mmol/L 103 105 104   CO2 mmol/L 32 28 30   ANION GAP mmol/L 2* 6 4   BUN mg/dL 17 15 14   CREATININE mg/dL 1 02 0 90 0 94   EGFR ml/min/1 73sq m 92 107 102   CALCIUM mg/dL 8 6 8 6 8 9     Results from last 7 days   Lab Units 04/26/21  1024 04/26/21  0626 04/25/21 2027 04/25/21  1653 04/25/21  1112 04/25/21  0623 04/24/21 2032 04/24/21  1658 04/24/21  1056 04/24/21  0625 04/23/21 2059 04/23/21  1619   POC GLUCOSE mg/dl 237* 130 198* 211* 228* 134 108 146* 242* 127 168* 256*     Results from last 7 days   Lab Units 04/23/21  0455 04/22/21  0607 04/20/21  0557   GLUCOSE RANDOM mg/dL 176* 139 132     Diet Markie/CHO Controlled; Consistent Carbohydrate Diet Level 2 (5 carb servings/75 grams CHO/meal)    Dietary nutrition supplements Ensure Compact BID with meals    Scheduled Medications:  acetaminophen, 975 mg, Oral, Q8H [facility] & half-way  aspirin, 81 mg, Oral, Daily  atorvastatin, 80 mg, Oral, Daily With Dinner  carvedilol, 6 25 mg, Oral, BID With Meals  chlorhexidine, 15 mL, Swish & Spit, Q12H SHAZIA  enoxaparin, 60 mg, Subcutaneous, Q12H SHAZIA  fluconazole, 200 mg, Oral, Daily  gabapentin, 200 mg, Oral, TID  insulin lispro, 4-20 Units, Subcutaneous, 4x Daily (AC & HS)  methocarbamol, 750 mg, Oral, Q6H SHAZIA  nystatin, , Topical, BID  polyethylene glycol, 17 g, Oral, Daily  senna-docusate sodium, 1 tablet, Oral, HS  ticagrelor, 90 mg, Oral, Q12H SHAZIA     PRN Meds:  bisacodyl, 5 mg, Oral, Daily PRN  glycopyrrolate, 0 2 mg, Intravenous, Q4H PRN  IV HYDROmorphone, 0 5 mg, Intravenous, Q3H PRN GIVEN X 2/ 24 HRS  HYDROmorphone, 4 mg, Oral, Q4H PRN  HYDROmorphone, 6 mg, Oral, Q4H PRN GIVEN X 4/ 24 HRS  naloxone, 0 1 mg, Intravenous, Q3 min PRN  sodium chloride, 1 spray, Each Nare, Q1H PRN    Discharge Plan: To be determined   Inpatient Case Management following for all discharge needs    Network Utilization Review Department  ATTENTION: Please call with any questions or concerns to [phone] and carefully listen to the prompts so that you are directed to the right person  All voicemails are confidential   Lynette Kussmaul all requests for admission clinical reviews, approved or denied determinations and any other requests to dedicated fax number below belonging to the campus where the patient is receiving treatment   List of dedicated fax numbers for the Facilities:  [address] DENIALS (Administrative/Medical Necessity) [phone]   [address] (Maternity/NICU/Pediatrics) [phone]   [address] Eugenie Gabriel [facility] [phone] [address] [place] [address] [address] [phone]

## 2021-04-26 NOTE — PROGRESS NOTES
Progress Note - General Surgery   Delorse Goodpasture 45 y o  male MRN: [id number]  Unit/Bed#: [facility] 512-01 Encounter: [id number]    Assessment:  45 y o  M with L thigh soft tissue mass s/p excision and vac placement 4/6  S/p L thigh washout and vac placement 4/17  S/p L thigh washout, debridement 4/22    Plan:    · Would replace wound vac, obtain VAC paperwork and plan for home with outpatient VAC/[place] therapy  · Continue diabetic diet  · DVT ppx  · OOB/Ambulate  · CM for dispo planning    Subjective/Objective     Subjective: No acute events  Doing well this AM     Objective:     Blood pressure 144/71, pulse 68, temperature 98 2 °F (36 8 °C), temperature source Oral, resp  rate 16, height 5' 6 75" (1 695 m), weight (!) 162 kg (356 lb 0 7 oz), SpO2 96 %  ,Body mass index is 56 18 kg/m²  Intake/Output Summary (Last 24 hours) at 4/26/2021 3480  Last data filed at 4/26/2021 5131  Gross per 24 hour   Intake 980 ml   Output 2100 ml   Net -1120 ml       Invasive Devices     Peripheral Intravenous Line            Peripheral IV 04/24/21 Proximal;Right;Ventral (anterior) Forearm 1 day                Physical Exam:   GEN: NAD  HEENT: MMM  CV: RRR  Lung: normal effort  Ab: Soft, NT/ND  Extrem: left groin dressing c/d/i  Neuro:  A+Ox3, motor and sensation grossly intact      Lab, Imaging and other studies:  CBC:   Lab Results   Component Value Date    WBC 10 64 (H) 04/26/2021    HGB 8 3 (L) 04/26/2021    HCT 26 6 (L) 04/26/2021    MCV 88 04/26/2021     (H) 04/26/2021    MCH 27 4 04/26/2021    MCHC 31 2 (L) 04/26/2021    RDW 14 1 04/26/2021    MPV 9 0 04/26/2021    NRBC 0 04/26/2021   , CMP:   No results found for: SODIUM, K, CL, CO2, ANIONGAP, BUN, CREATININE, GLUCOSE, CALCIUM, AST, ALT, ALKPHOS, PROT, BILITOT, EGFR  VTE Pharmacologic Prophylaxis: Enoxaparin (Lovenox)  VTE Mechanical Prophylaxis: sequential compression device

## 2021-04-26 NOTE — PROGRESS NOTES
Progress Note - Acute Pain Service    Bozena Noriega 45 y o  male MRN: [id number]  Unit/Bed#: [facility] 512-01 Encounter: [id number]      Assessment:   Principal Problem:    Left groin mass  Active Problems:    CAD (coronary artery disease)    Class 3 severe obesity due to excess calories with serious comorbidity and body mass index (BMI) of 50 0 to 59 9 in [facility])    Bozena Noriega is a 45 y o  male   Admitted on 4/6/21 for mass excision of hidradenitis in the left proximal thigh with VAC dressing placement   Patient's pain has been difficult to control, therefore APS consulted        Plan:    Continue Tylenol 975 mg p o  q 8 hours scheduled   Continue Dilaudid 4 mg p o  q 4 hours p r n  moderate pain   Continue Dilaudid 6 mg p o  q 4 hours p r n  severe pain   Continue Dilaudid 0 5 mg IV q 3 hours p r n  breakthrough pain  If being discharged today, discontinue IV Dilaudid   Continue Neurontin 200 mg p o  t i d  scheduled   Continue Robaxin 750 mg p o  q 6 hours scheduled   Continue bowel regimen to avoid opioid induced constipation   If discharged today, continue Tylenol, Neurontin, Robaxin and bowel regimen as currently ordered   If discharged today, suggest  Dilaudid 2 mg tablets, 2-3 tablets p o  q 4 hours p r n  moderate to severe pain x7 days, then 1-2 tablets p o  q 4 hours p r n  moderate to severe pain   Suggest discharge with prescription for and instructions on use of Narcan  APS will continue to follow  Please call  / [phone] or [employer] Acute Pain Service - B (/ between [phone] and on weekends) with questions or concerns    Pain History  Current pain location(s):   Left  leg  Pain Scale:    7/10  Quality:  Burning, sharp  24 hour history:  Patient continues to complain of left leg pain at incision site  Opioid requirement previous 24 hours:   Dilaudid 18 mg p o , Dilaudid 2 mg IV      Meds/Allergies   all current active meds have been reviewed    Allergies   Allergen Reactions    Amoxicillin Hives       Objective     Temp:  [98 2 °F (36 8 °C)-98 5 °F (36 9 °C)] 98 2 °F (36 8 °C)  HR:  [59-68] 59  Resp:  [16] 16  BP: (123-144)/(58-71) 125/58    Physical Exam  Vitals signs and nursing note reviewed  Constitutional:       General: He is awake  He is not in acute distress  Skin:     General: Skin is warm and dry  Neurological:      Mental Status: He is alert and oriented to person, place, and time  GCS: GCS eye subscore is 4  GCS verbal subscore is 5  GCS motor subscore is 6  Psychiatric:         Behavior: Behavior is cooperative  Lab Results:   Results from last 7 days   Lab Units 04/26/21  0542   WBC Thousand/uL 10 64*   HEMOGLOBIN g/dL 8 3*   HEMATOCRIT % 26 6*   PLATELETS Thousands/uL 504*      Results from last 7 days   Lab Units 04/23/21  0455   POTASSIUM mmol/L 4 4   CHLORIDE mmol/L 103   CO2 mmol/L 32   BUN mg/dL 17   CREATININE mg/dL 1 02   CALCIUM mg/dL 8 6       Imaging Studies: I have personally reviewed pertinent reports  EKG, Pathology, and Other Studies: I have personally reviewed pertinent reports  Counseling / Coordination of Care  Total floor / unit time spent today 30 minutes  Greater than 50% of total time was spent with the patient and / or family counseling and / or coordination of care  A description of the counseling / coordination of care:  Patient interview, physical examination, review of medical record, review of imaging and laboratory data, development of pain management plan, discussion of pain management plan with patient and primary service  Please note that the APS provides consultative services regarding pain management only  With the exception of ketamine and epidural infusions and except when indicated, final decisions regarding starting or changing doses of analgesic medications are at the discretion of the consulting service    Off hours consultation and/or medication management is generally not available      Queta Velarde PA-C  Acute Pain Service

## 2021-04-26 NOTE — QUICK NOTE
Nurse-Patient-Provider rounds were completed with the patient's nurse today, Michaela Jackson  We discussed the plan is to continue oral diet as tolerated  Continue local wound care to the left groin/ thigh wound with planned dressing change later today  Anticipate transition to dressing application with [place] and overlying VAC dressing in the next 24-48 hours pending availability of [place] dressing  Continue current analgesic regimen with plan for discharge regimen discussed with APS; no change and current pain medication regimen at this time  We reviewed all of the invasive devices/lines/telemetry orders   - None  DVT Prophylaxis:  - Lovenox and SCDs  Pain Assessment / Plan:  - Continue current analgesic regimen  Mobility Assessment / Plan:  - Activity as tolerated  Goals / Barriers for discharge:  - Anticipate discharge in the next 24-48 hours  All questions and concerns were addressed  I spent greater than 18 minutes reviewing the plan with the patient and the nurse, and coordinating care for the day      Romana Diane PA-C  4/26/2021 12:14 PM

## 2021-04-26 NOTE — PLAN OF CARE
Problem: Prexisting or High Potential for Compromised Skin Integrity  Goal: Skin integrity is maintained or improved  Description: INTERVENTIONS:  - Identify patients at risk for skin breakdown  - Assess and monitor skin integrity  - Assess and monitor nutrition and hydration status  - Monitor labs   - Assess for incontinence   - Turn and reposition patient  - Assist with mobility/ambulation  - Relieve pressure over bony prominences  - Avoid friction and shearing  - Provide appropriate hygiene as needed including keeping skin clean and dry  - Evaluate need for skin moisturizer/barrier cream  - Collaborate with interdisciplinary team   - Patient/family teaching  - Consider wound care consult   Outcome: Progressing     Problem: SAFETY,RESTRAINT: NV/NON-SELF DESTRUCTIVE BEHAVIOR  Goal: Remains free of harm/injury (restraint for non violent/non self-detsructive behavior)  Description: INTERVENTIONS:  - Instruct patient/family regarding restraint use   - Assess and monitor physiologic and psychological status   - Provide interventions and comfort measures to meet assessed patient needs   - Identify and implement measures to help patient regain control  - Assess readiness for release of restraint   Outcome: Progressing  Goal: Returns to optimal restraint-free functioning  Description: INTERVENTIONS:  - Assess the patient's behavior and symptoms that indicate continued need for restraint  - Identify and implement measures to help patient regain control  - Assess readiness for release of restraint   Outcome: Progressing     Problem: PAIN - ADULT  Goal: Verbalizes/displays adequate comfort level or baseline comfort level  Description: Interventions:  - Encourage patient to monitor pain and request assistance  - Assess pain using appropriate pain scale  - Administer analgesics based on type and severity of pain and evaluate response  - Implement non-pharmacological measures as appropriate and evaluate response  - Consider cultural and social influences on pain and pain management  - Notify physician/advanced practitioner if interventions unsuccessful or patient reports new pain  Outcome: Progressing     Problem: INFECTION - ADULT  Goal: Absence or prevention of progression during hospitalization  Description: INTERVENTIONS:  - Assess and monitor for signs and symptoms of infection  - Monitor lab/diagnostic results  - Monitor all insertion sites, i e  indwelling lines, tubes, and drains  - Monitor endotracheal if appropriate and nasal secretions for changes in amount and color  - [place] appropriate cooling/warming therapies per order  - Administer medications as ordered  - Instruct and encourage patient and family to use good hand hygiene technique  - Identify and instruct in appropriate isolation precautions for identified infection/condition  Outcome: Progressing     Problem: SAFETY ADULT  Goal: Patient will remain free of falls  Description: INTERVENTIONS:  - Assess patient frequently for physical needs  -  Identify cognitive and physical deficits and behaviors that affect risk of falls    -  [place] fall precautions as indicated by assessment   - Educate patient/family on patient safety including physical limitations  - Instruct patient to call for assistance with activity based on assessment  - Modify environment to reduce risk of injury  - Consider OT/PT consult to assist with strengthening/mobility  Outcome: Progressing  Goal: Maintain or return to baseline ADL function  Description: INTERVENTIONS:  -  Assess patient's ability to carry out ADLs; assess patient's baseline for ADL function and identify physical deficits which impact ability to perform ADLs (bathing, care of mouth/teeth, toileting, grooming, dressing, etc )  - Assess/evaluate cause of self-care deficits   - Assess range of motion  - Assess patient's mobility; develop plan if impaired  - Assess patient's need for assistive devices and provide as appropriate  - Encourage maximum independence but intervene and supervise when necessary  - Involve family in performance of ADLs  - Assess for home care needs following discharge   - Consider OT consult to assist with ADL evaluation and planning for discharge  - Provide patient education as appropriate  Outcome: Progressing  Goal: Maintain or return mobility status to optimal level  Description: INTERVENTIONS:  - Assess patient's baseline mobility status (ambulation, transfers, stairs, etc )    - Identify cognitive and physical deficits and behaviors that affect mobility  - Identify mobility aids required to assist with transfers and/or ambulation (gait belt, sit-to-stand, lift, walker, cane, etc )  - [place] fall precautions as indicated by assessment  - Record patient progress and toleration of activity level on Mobility SBAR; progress patient to next Phase/Stage  - Instruct patient to call for assistance with activity based on assessment  - Consider rehabilitation consult to assist with strengthening/weightbearing, etc   Outcome: Progressing     Problem: DISCHARGE PLANNING  Goal: Discharge to home or other facility with appropriate resources  Description: INTERVENTIONS:  - Identify barriers to discharge w/patient and caregiver  - Arrange for needed discharge resources and transportation as appropriate  - Identify discharge learning needs (meds, wound care, etc )  - Arrange for interpretive services to assist at discharge as needed  - Refer to Case Management Department for coordinating discharge planning if the patient needs post-hospital services based on physician/advanced practitioner order or complex needs related to functional status, cognitive ability, or social support system  Outcome: Progressing     Problem: Knowledge Deficit  Goal: Patient/family/caregiver demonstrates understanding of disease process, treatment plan, medications, and discharge instructions  Description: Complete learning assessment and assess knowledge base  Interventions:  - Provide teaching at level of understanding  - Provide teaching via preferred learning methods  Outcome: Progressing     Problem: CARDIOVASCULAR - ADULT  Goal: Maintains optimal cardiac output and hemodynamic stability  Description: INTERVENTIONS:  - Monitor I/O, vital signs and rhythm  - Monitor for S/S and trends of decreased cardiac output  - Administer and titrate ordered vasoactive medications to optimize hemodynamic stability  - Assess quality of pulses, skin color and temperature  - Assess for signs of decreased coronary artery perfusion  - Instruct patient to report change in severity of symptoms  Outcome: Progressing  Goal: Absence of cardiac dysrhythmias or at baseline rhythm  Description: INTERVENTIONS:  - Continuous cardiac monitoring, vital signs, obtain 12 lead EKG if ordered  - Administer antiarrhythmic and heart rate control medications as ordered  - Monitor electrolytes and administer replacement therapy as ordered  Outcome: Progressing     Problem: RESPIRATORY - ADULT  Goal: Achieves optimal ventilation and oxygenation  Description: INTERVENTIONS:  - Assess for changes in respiratory status  - Assess for changes in mentation and behavior  - Position to facilitate oxygenation and minimize respiratory effort  - Oxygen administered by appropriate delivery if ordered  - Initiate smoking cessation education as indicated  - Encourage broncho-pulmonary hygiene including cough, deep breathe, Incentive Spirometry  - Assess the need for suctioning and aspirate as needed  - Assess and instruct to report SOB or any respiratory difficulty  - Respiratory Therapy support as indicated  Outcome: Progressing     Problem: Potential for Falls  Goal: Patient will remain free of falls  Description: INTERVENTIONS:  - Assess patient frequently for physical needs  -  Identify cognitive and physical deficits and behaviors that affect risk of falls    -  [place] fall precautions as indicated by assessment   - Educate patient/family on patient safety including physical limitations  - Instruct patient to call for assistance with activity based on assessment  - Modify environment to reduce risk of injury  - Consider OT/PT consult to assist with strengthening/mobility  Outcome: Progressing     Problem: Nutrition/Hydration-ADULT  Goal: Nutrient/Hydration intake appropriate for improving, restoring or maintaining nutritional needs  Description: Monitor and assess patient's nutrition/hydration status for malnutrition  Collaborate with interdisciplinary team and initiate plan and interventions as ordered  Monitor patient's weight and dietary intake as ordered or per policy  Utilize nutrition screening tool and intervene as necessary  Determine patient's food preferences and provide high-protein, high-caloric foods as appropriate       INTERVENTIONS:  - Monitor oral intake, urinary output, labs, and treatment plans  - Assess nutrition and hydration status and recommend course of action  - Evaluate amount of meals eaten  - Assist patient with eating if necessary   - Allow adequate time for meals  - Recommend/ encourage appropriate diets, oral nutritional supplements, and vitamin/mineral supplements  - Order, calculate, and assess calorie counts as needed  - Recommend, monitor, and adjust tube feedings and TPN/PPN based on assessed needs  - Assess need for intravenous fluids  - Provide specific nutrition/hydration education as appropriate  - Include patient/family/caregiver in decisions related to nutrition  Outcome: Progressing

## 2021-04-27 LAB
ABO GROUP BLD BPU: NORMAL
ABO GROUP BLD BPU: NORMAL
BPU ID: NORMAL
BPU ID: NORMAL
CROSSMATCH: NORMAL
CROSSMATCH: NORMAL
ERYTHROCYTE [DISTWIDTH] IN BLOOD BY AUTOMATED COUNT: 14.1 % (ref 11.6–15.1)
GLUCOSE SERPL-MCNC: 115 MG/DL (ref 65–140)
GLUCOSE SERPL-MCNC: 159 MG/DL (ref 65–140)
GLUCOSE SERPL-MCNC: 172 MG/DL (ref 65–140)
GLUCOSE SERPL-MCNC: 236 MG/DL (ref 65–140)
HCT VFR BLD AUTO: 26 % (ref 36.5–49.3)
HGB BLD-MCNC: 8.2 G/DL (ref 12–17)
MCH RBC QN AUTO: 28.1 PG (ref 26.8–34.3)
MCHC RBC AUTO-ENTMCNC: 31.5 G/DL (ref 31.4–37.4)
MCV RBC AUTO: 89 FL (ref 82–98)
PLATELET # BLD AUTO: 516 THOUSANDS/UL (ref 149–390)
PMV BLD AUTO: 8.9 FL (ref 8.9–12.7)
RBC # BLD AUTO: 2.92 MILLION/UL (ref 3.88–5.62)
UNIT DISPENSE STATUS: NORMAL
UNIT DISPENSE STATUS: NORMAL
UNIT PRODUCT CODE: NORMAL
UNIT PRODUCT CODE: NORMAL
UNIT RH: NORMAL
UNIT RH: NORMAL
WBC # BLD AUTO: 9.93 THOUSAND/UL (ref 4.31–10.16)

## 2021-04-27 PROCEDURE — 99232 SBSQ HOSP IP/OBS MODERATE 35: CPT | Performed by: PHYSICIAN ASSISTANT

## 2021-04-27 PROCEDURE — 82948 REAGENT STRIP/BLOOD GLUCOSE: CPT

## 2021-04-27 PROCEDURE — 99024 POSTOP FOLLOW-UP VISIT: CPT | Performed by: SURGERY

## 2021-04-27 PROCEDURE — 85027 COMPLETE CBC AUTOMATED: CPT | Performed by: SURGERY

## 2021-04-27 RX ADMIN — CARVEDILOL 6.25 MG: 6.25 TABLET, FILM COATED ORAL at 08:05

## 2021-04-27 RX ADMIN — INSULIN LISPRO 4 UNITS: 100 INJECTION, SOLUTION INTRAVENOUS; SUBCUTANEOUS at 11:27

## 2021-04-27 RX ADMIN — CHLORHEXIDINE GLUCONATE 0.12% ORAL RINSE 15 ML: 1.2 LIQUID ORAL at 08:04

## 2021-04-27 RX ADMIN — METHOCARBAMOL TABLETS 750 MG: 750 TABLET, COATED ORAL at 12:29

## 2021-04-27 RX ADMIN — HYDROMORPHONE HYDROCHLORIDE 0.5 MG: 1 INJECTION, SOLUTION INTRAMUSCULAR; INTRAVENOUS; SUBCUTANEOUS at 11:25

## 2021-04-27 RX ADMIN — METHOCARBAMOL TABLETS 750 MG: 750 TABLET, COATED ORAL at 06:15

## 2021-04-27 RX ADMIN — ACETAMINOPHEN 975 MG: 325 TABLET ORAL at 06:15

## 2021-04-27 RX ADMIN — FLUCONAZOLE 200 MG: 200 TABLET ORAL at 08:05

## 2021-04-27 RX ADMIN — GABAPENTIN 200 MG: 100 CAPSULE ORAL at 17:34

## 2021-04-27 RX ADMIN — INSULIN LISPRO 4 UNITS: 100 INJECTION, SOLUTION INTRAVENOUS; SUBCUTANEOUS at 22:12

## 2021-04-27 RX ADMIN — DOCUSATE SODIUM AND SENNOSIDES 1 TABLET: 8.6; 5 TABLET ORAL at 22:10

## 2021-04-27 RX ADMIN — TICAGRELOR 90 MG: 90 TABLET ORAL at 08:05

## 2021-04-27 RX ADMIN — ACETAMINOPHEN 975 MG: 325 TABLET ORAL at 22:10

## 2021-04-27 RX ADMIN — HYDROMORPHONE HYDROCHLORIDE 6 MG: 2 TABLET ORAL at 22:09

## 2021-04-27 RX ADMIN — ENOXAPARIN SODIUM 60 MG: 60 INJECTION SUBCUTANEOUS at 08:05

## 2021-04-27 RX ADMIN — HYDROMORPHONE HYDROCHLORIDE 0.5 MG: 1 INJECTION, SOLUTION INTRAMUSCULAR; INTRAVENOUS; SUBCUTANEOUS at 23:17

## 2021-04-27 RX ADMIN — GABAPENTIN 200 MG: 100 CAPSULE ORAL at 22:10

## 2021-04-27 RX ADMIN — NYSTATIN: 100000 POWDER TOPICAL at 17:36

## 2021-04-27 RX ADMIN — TICAGRELOR 90 MG: 90 TABLET ORAL at 22:10

## 2021-04-27 RX ADMIN — METHOCARBAMOL TABLETS 750 MG: 750 TABLET, COATED ORAL at 17:34

## 2021-04-27 RX ADMIN — INSULIN LISPRO 8 UNITS: 100 INJECTION, SOLUTION INTRAVENOUS; SUBCUTANEOUS at 17:41

## 2021-04-27 RX ADMIN — ATORVASTATIN CALCIUM 80 MG: 80 TABLET, FILM COATED ORAL at 17:34

## 2021-04-27 RX ADMIN — NYSTATIN: 100000 POWDER TOPICAL at 08:06

## 2021-04-27 RX ADMIN — GABAPENTIN 200 MG: 100 CAPSULE ORAL at 08:06

## 2021-04-27 RX ADMIN — ASPIRIN 81 MG: 81 TABLET, CHEWABLE ORAL at 08:05

## 2021-04-27 RX ADMIN — ACETAMINOPHEN 975 MG: 325 TABLET ORAL at 12:29

## 2021-04-27 RX ADMIN — CARVEDILOL 6.25 MG: 6.25 TABLET, FILM COATED ORAL at 17:34

## 2021-04-27 RX ADMIN — METHOCARBAMOL TABLETS 750 MG: 750 TABLET, COATED ORAL at 22:15

## 2021-04-27 NOTE — QUICK NOTE
Nurse-Patient-Provider rounds were completed with the patient's nurse today  We discussed the plan is to  [facility] change today at bedside  Please see VAC procedure note for full details  Anticipate discharge in next 24 hours  We reviewed all of the invasive devices/lines/telemetry orders   - None  DVT Prophylaxis:   Lovenox and SCDs    Pain Assessment / Plan:  - Continue current analgesic regimen  Mobility Assessment / Plan:  - Activity as tolerated  Goals / Barriers for discharge:  -  Discharge on 04/28/2021 pending VNA  -   [facility] paperwork  Case management following; case and discharge needs discussed  All questions and concerns were addressed  I spent greater than 12 minutes reviewing the plan with the patient and the nurse, and coordinating care for the day      Pradip Simmons PA-C  4/27/2021

## 2021-04-27 NOTE — PROGRESS NOTES
Progress Note - Acute Pain Service    Mallory Wallis 45 y o  male MRN: [id number]  Unit/Bed#: [facility] 512-01 Encounter: [id number]      Assessment:   Principal Problem:    Left groin mass  Active Problems:    CAD (coronary artery disease)    Class 3 severe obesity due to excess calories with serious comorbidity and body mass index (BMI) of 50 0 to 59 9 in adult [facility])    Mallory Wallis is a 45 y o  male   Admitted on 4/6/21 for mass excision of hidradenitis in the left proximal thigh with VAC dressing placement   Patient's pain has been difficult to control, therefore APS consulted  Plan:    Continue Tylenol 975 mg p o  q 8 hours scheduled   Change Dilaudid to 2 mg p o  q 4 hours p r n  moderate pain   Change Dilaudid to 4 mg p o  q 4 hours p r n  severe pain  Kartik Foster Discontinue IV Dilaudid  May use 1 time doses for bedside procedures   Increase Neurontin to 300 mg p o  t i d  scheduled   Continue Robaxin 750 mg p o  q 6 hours scheduled   Continue bowel regimen to avoid opioid induced constipation   If patient discharged today or tomorrow, continue Tylenol, Neurontin, Robaxin and bowel regimen as above   If patient discharged today or tomorrow, suggest Dilaudid 2 mg tablets, 1-2 to p o  q 4 hours p r n  moderate to severe pain for 5-7 days or until follow-up  APS will continue to follow  Please call  / [phone] or [facility] - [facility] (/ between [phone] and on weekends) with questions or concerns    Pain History  Current pain location(s):   Left leg  Pain Scale:    5/10  Quality:  Burning, sharp  24 hour history:  Patient has been requiring less opioid pain medication recently  Patient seen along with surgery team during [facility] dressing change  Patient tolerated this well with Dilaudid 0 5 mg IV prior  Opioid requirement previous 24 hours:   Dilaudid 6 mg p o , Dilaudid 1 5 mg IV      Meds/Allergies   all current active meds have been reviewed, current meds:   Current Facility-Administered Medications   Medication Dose Route Frequency    acetaminophen (TYLENOL) tablet 975 mg  975 mg Oral Q8H [facility]    aspirin chewable tablet 81 mg  81 mg Oral Daily    atorvastatin (LIPITOR) tablet 80 mg  80 mg Oral Daily With Dinner    bisacodyl (DULCOLAX) EC tablet 5 mg  5 mg Oral Daily PRN    carvedilol (COREG) tablet 6 25 mg  6 25 mg Oral BID With Meals    chlorhexidine (PERIDEX) 0 12 % oral rinse 15 mL  15 mL Swish & Spit Q12H [facility]    enoxaparin (LOVENOX) subcutaneous injection 60 mg  60 mg Subcutaneous Q12H [facility]    fluconazole (DIFLUCAN) tablet 200 mg  200 mg Oral Daily    gabapentin (NEURONTIN) capsule 200 mg  200 mg Oral TID    glycopyrrolate (ROBINUL) injection 0 2 mg  0 2 mg Intravenous Q4H PRN    HYDROmorphone (DILAUDID) injection 0 5 mg  0 5 mg Intravenous Q3H PRN    HYDROmorphone (DILAUDID) tablet 4 mg  4 mg Oral Q4H PRN    Or    HYDROmorphone (DILAUDID) tablet 6 mg  6 mg Oral Q4H PRN    insulin lispro (HumaLOG) 100 units/mL subcutaneous injection 4-20 Units  4-20 Units Subcutaneous 4x Daily (AC & HS)    methocarbamol (ROBAXIN) tablet 750 mg  750 mg Oral Q6H [facility]    naloxone (NARCAN) injection 0 1 mg  0 1 mg Intravenous Q3 min PRN    nystatin (MYCOSTATIN) powder   Topical BID    polyethylene glycol (MIRALAX) packet 17 g  17 g Oral Daily    senna-docusate sodium (SENOKOT S) 8 6-50 mg per tablet 1 tablet  1 tablet Oral HS    sodium chloride (OCEAN) 0 65 % nasal spray 1 spray  1 spray Each Nare Q1H PRN    ticagrelor (BRILINTA) tablet 90 mg  90 mg Oral Q12H [facility]    and PTA meds:   Prior to Admission Medications   Prescriptions Last Dose Informant Patient Reported? Taking?    Omega-3 Fatty Acids (fish oil) 1,000 mg 4/5/2021 at 0500  No Yes   Sig: Take 1 capsule (1,000 mg total) by mouth 2 (two) times a day   Ticagrelor (BRILINTA PO) 4/5/2021 at 0500  Yes Yes   Sig: Take by mouth   amLODIPine (NORVASC) 10 mg tablet 4/5/2021 at 0500  No Yes   Sig: take 1 tablet by mouth once daily   aspirin (ECOTRIN LOW STRENGTH) 81 mg EC tablet 4/1/2021  No Yes   Sig: Take 1 tablet (81 mg total) by mouth daily   atorvastatin (LIPITOR) 80 mg tablet 4/4/2021 at 1900  No Yes   Sig: Take 1 tablet (80 mg total) by mouth daily   carvedilol (COREG) 6 25 mg tablet 4/5/2021 at 0500  No Yes   Sig: Take 1 tablet (6 25 mg total) by mouth 2 (two) times a day with meals   clindamycin (CLEOCIN) 150 mg capsule 4/5/2021 at 1700  No Yes   Sig: Take 1 capsule (150 mg total) by mouth every 12 (twelve) hours for 6 days   glimepiride (AMARYL) 4 mg tablet 4/5/2021 at 0500  No Yes   Sig: Take 1 tablet (4 mg total) by mouth daily with breakfast   lisinopril (ZESTRIL) 10 mg tablet 4/5/2021 at 0500  No Yes   Sig: Take 1 tablet (10 mg total) by mouth daily   metFORMIN (GLUCOPHAGE) 1000 MG tablet 4/5/2021 at 0500  No Yes   Sig: Take 1 tablet (1,000 mg total) by mouth 2 (two) times a day with meals      Facility-Administered Medications: None       Allergies   Allergen Reactions    Amoxicillin Hives       Objective     Temp:  [98 3 °F (36 8 °C)-98 4 °F (36 9 °C)] 98 4 °F (36 9 °C)  HR:  [60-65] 61  Resp:  [18] 18  BP: (121-128)/(62-77) 128/77    Physical Exam  Vitals signs and nursing note reviewed  Constitutional:       General: He is awake  He is not in acute distress  Skin:     General: Skin is warm and dry  Neurological:      Mental Status: He is alert and oriented to person, place, and time  GCS: GCS eye subscore is 4  GCS verbal subscore is 5  GCS motor subscore is 6  Psychiatric:         Behavior: Behavior is cooperative           Lab Results:   Results from last 7 days   Lab Units 04/27/21  0518   WBC Thousand/uL 9 93   HEMOGLOBIN g/dL 8 2*   HEMATOCRIT % 26 0*   PLATELETS Thousands/uL 516*      Results from last 7 days   Lab Units 04/23/21  0455   POTASSIUM mmol/L 4 4   CHLORIDE mmol/L 103   CO2 mmol/L 32   BUN mg/dL 17   CREATININE mg/dL 1 02   CALCIUM mg/dL 8 6       Imaging Studies: I have personally reviewed pertinent reports  EKG, Pathology, and Other Studies: I have personally reviewed pertinent reports  Counseling / Coordination of Care  Total floor / unit time spent today 30 minutes  Greater than 50% of total time was spent with the patient and / or family counseling and / or coordination of care  A description of the counseling / coordination of care:  Patient interview, physical examination, review of medical record, review of imaging and laboratory data, development of pain management plan, discussion of pain management plan with patient and primary service  Please note that the APS provides consultative services regarding pain management only  With the exception of ketamine and epidural infusions and except when indicated, final decisions regarding starting or changing doses of analgesic medications are at the discretion of the consulting service  Off hours consultation and/or medication management is generally not available      Wolfgang Amezcua PA-C  Acute Pain Service

## 2021-04-27 NOTE — RESTORATIVE TECHNICIAN NOTE
Restorative Specialist Mobility Note       Activity: Ambulate in room, [address] privileges, Other (Comment)(Pt refusing further ambulation until after dressing change)     Assistive Device: None

## 2021-04-27 NOTE — PROGRESS NOTES
Progress Note - General Surgery   Robert Carroll 45 y o  male MRN: [id number]  Unit/Bed#: [facility] 512-01 Encounter: [id number]    Assessment:  45 y o  M with L thigh soft tissue mass s/p excision and vac placement 4/6  S/p L thigh washout and vac placement 4/17  S/p L thigh washout, debridement 4/22    Plan:    · Plan for application of [place] + Wound VAC, home VAC paperwork completed, will discharge with this dressing in place  Patient to follow up in 10-14 days for VAC change in the office, evaluation of wound and possible re-application of [place] as needed  · Continue diabetic diet  · DVT ppx  · OOB/Ambulate  · CM for dispo planning    Subjective/Objective     Subjective: No acute events  Doing well  Objective:     Blood pressure 123/62, pulse 65, temperature 98 3 °F (36 8 °C), temperature source Oral, resp  rate 18, height 5' 6 75" (1 695 m), weight (!) 162 kg (356 lb 0 7 oz), SpO2 95 %  ,Body mass index is 56 18 kg/m²  Intake/Output Summary (Last 24 hours) at 4/27/2021 0553  Last data filed at 4/26/2021 2328  Gross per 24 hour   Intake 480 ml   Output 1150 ml   Net -670 ml       Invasive Devices     Peripheral Intravenous Line            Peripheral IV 04/24/21 Proximal;Right;Ventral (anterior) Forearm 2 days                Physical Exam:   GEN: NAD  HEENT: MMM  CV: warm/well perfused  Lung: normal effort  Ab: Soft, NT/ND  Extrem: Left groin dressing in place, clean and dry  Neuro:  A+Ox3, motor and sensation grossly intact    Lab, Imaging and other studies:  CBC:   Lab Results   Component Value Date    WBC 9 93 04/27/2021    HGB 8 2 (L) 04/27/2021    HCT 26 0 (L) 04/27/2021    MCV 89 04/27/2021     (H) 04/27/2021    MCH 28 1 04/27/2021    MCHC 31 5 04/27/2021    RDW 14 1 04/27/2021    MPV 8 9 04/27/2021   , CMP:   No results found for: SODIUM, K, CL, CO2, ANIONGAP, BUN, CREATININE, GLUCOSE, CALCIUM, AST, ALT, ALKPHOS, PROT, BILITOT, EGFR  VTE Pharmacologic Prophylaxis: Enoxaparin (Lovenox)  VTE Mechanical Prophylaxis: sequential compression device

## 2021-04-27 NOTE — PLAN OF CARE
Problem: Prexisting or High Potential for Compromised Skin Integrity  Goal: Skin integrity is maintained or improved  Description: INTERVENTIONS:  - Identify patients at risk for skin breakdown  - Assess and monitor skin integrity  - Assess and monitor nutrition and hydration status  - Monitor labs   - Assess for incontinence   - Turn and reposition patient  - Assist with mobility/ambulation  - Relieve pressure over bony prominences  - Avoid friction and shearing  - Provide appropriate hygiene as needed including keeping skin clean and dry  - Evaluate need for skin moisturizer/barrier cream  - Collaborate with interdisciplinary team   - Patient/family teaching  - Consider wound care consult   Outcome: Progressing     Problem: PAIN - ADULT  Goal: Verbalizes/displays adequate comfort level or baseline comfort level  Description: Interventions:  - Encourage patient to monitor pain and request assistance  - Assess pain using appropriate pain scale  - Administer analgesics based on type and severity of pain and evaluate response  - Implement non-pharmacological measures as appropriate and evaluate response  - Consider cultural and social influences on pain and pain management  - Notify physician/advanced practitioner if interventions unsuccessful or patient reports new pain  Outcome: Progressing     Problem: INFECTION - ADULT  Goal: Absence or prevention of progression during hospitalization  Description: INTERVENTIONS:  - Assess and monitor for signs and symptoms of infection  - Monitor lab/diagnostic results  - Monitor all insertion sites, i e  indwelling lines, tubes, and drains  - Monitor endotracheal if appropriate and nasal secretions for changes in amount and color  - Ethel appropriate cooling/warming therapies per order  - Administer medications as ordered  - Instruct and encourage patient and family to use good hand hygiene technique  - Identify and instruct in appropriate isolation precautions for identified infection/condition  Outcome: Progressing     Problem: SAFETY ADULT  Goal: Patient will remain free of falls  Description: INTERVENTIONS:  - Assess patient frequently for physical needs  -  Identify cognitive and physical deficits and behaviors that affect risk of falls    -  [facility] fall precautions as indicated by assessment   - Educate patient/family on patient safety including physical limitations  - Instruct patient to call for assistance with activity based on assessment  - Modify environment to reduce risk of injury  - Consider OT/PT consult to assist with strengthening/mobility  Outcome: Progressing  Goal: Maintain or return to baseline ADL function  Description: INTERVENTIONS:  -  Assess patient's ability to carry out ADLs; assess patient's baseline for ADL function and identify physical deficits which impact ability to perform ADLs (bathing, care of mouth/teeth, toileting, grooming, dressing, etc )  - Assess/evaluate cause of self-care deficits   - Assess range of motion  - Assess patient's mobility; develop plan if impaired  - Assess patient's need for assistive devices and provide as appropriate  - Encourage maximum independence but intervene and supervise when necessary  - Involve family in performance of ADLs  - Assess for home care needs following discharge   - Consider OT consult to assist with ADL evaluation and planning for discharge  - Provide patient education as appropriate  Outcome: Progressing  Goal: Maintain or return mobility status to optimal level  Description: INTERVENTIONS:  - Assess patient's baseline mobility status (ambulation, transfers, stairs, etc )    - Identify cognitive and physical deficits and behaviors that affect mobility  - Identify mobility aids required to assist with transfers and/or ambulation (gait belt, sit-to-stand, lift, walker, cane, etc )  - [facility] fall precautions as indicated by assessment  - Record patient progress and toleration of activity level on Mobility SBAR; progress patient to next Phase/Stage  - Instruct patient to call for assistance with activity based on assessment  - Consider rehabilitation consult to assist with strengthening/weightbearing, etc   Outcome: Progressing     Problem: DISCHARGE PLANNING  Goal: Discharge to home or other facility with appropriate resources  Description: INTERVENTIONS:  - Identify barriers to discharge w/patient and caregiver  - Arrange for needed discharge resources and transportation as appropriate  - Identify discharge learning needs (meds, wound care, etc )  - Arrange for interpretive services to assist at discharge as needed  - Refer to Case Management Department for coordinating discharge planning if the patient needs post-hospital services based on physician/advanced practitioner order or complex needs related to functional status, cognitive ability, or social support system  Outcome: Progressing     Problem: Knowledge Deficit  Goal: Patient/family/caregiver demonstrates understanding of disease process, treatment plan, medications, and discharge instructions  Description: Complete learning assessment and assess knowledge base    Interventions:  - Provide teaching at level of understanding  - Provide teaching via preferred learning methods  Outcome: Progressing     Problem: CARDIOVASCULAR - ADULT  Goal: Maintains optimal cardiac output and hemodynamic stability  Description: INTERVENTIONS:  - Monitor I/O, vital signs and rhythm  - Monitor for S/S and trends of decreased cardiac output  - Administer and titrate ordered vasoactive medications to optimize hemodynamic stability  - Assess quality of pulses, skin color and temperature  - Assess for signs of decreased coronary artery perfusion  - Instruct patient to report change in severity of symptoms  Outcome: Progressing  Goal: Absence of cardiac dysrhythmias or at baseline rhythm  Description: INTERVENTIONS:  - Continuous cardiac monitoring, vital signs, obtain 12 lead EKG if ordered  - Administer antiarrhythmic and heart rate control medications as ordered  - Monitor electrolytes and administer replacement therapy as ordered  Outcome: Progressing     Problem: RESPIRATORY - ADULT  Goal: Achieves optimal ventilation and oxygenation  Description: INTERVENTIONS:  - Assess for changes in respiratory status  - Assess for changes in mentation and behavior  - Position to facilitate oxygenation and minimize respiratory effort  - Oxygen administered by appropriate delivery if ordered  - Initiate smoking cessation education as indicated  - Encourage broncho-pulmonary hygiene including cough, deep breathe, Incentive Spirometry  - Assess the need for suctioning and aspirate as needed  - Assess and instruct to report SOB or any respiratory difficulty  - Respiratory Therapy support as indicated  Outcome: Progressing     Problem: Potential for Falls  Goal: Patient will remain free of falls  Description: INTERVENTIONS:  - Assess patient frequently for physical needs  -  Identify cognitive and physical deficits and behaviors that affect risk of falls  -  [facility] fall precautions as indicated by assessment   - Educate patient/family on patient safety including physical limitations  - Instruct patient to call for assistance with activity based on assessment  - Modify environment to reduce risk of injury  - Consider OT/PT consult to assist with strengthening/mobility  Outcome: Progressing     Problem: Nutrition/Hydration-ADULT  Goal: Nutrient/Hydration intake appropriate for improving, restoring or maintaining nutritional needs  Description: Monitor and assess patient's nutrition/hydration status for malnutrition  Collaborate with interdisciplinary team and initiate plan and interventions as ordered  Monitor patient's weight and dietary intake as ordered or per policy  Utilize nutrition screening tool and intervene as necessary   Determine patient's food preferences and provide high-protein, high-caloric foods as appropriate       INTERVENTIONS:  - Monitor oral intake, urinary output, labs, and treatment plans  - Assess nutrition and hydration status and recommend course of action  - Evaluate amount of meals eaten  - Assist patient with eating if necessary   - Allow adequate time for meals  - Recommend/ encourage appropriate diets, oral nutritional supplements, and vitamin/mineral supplements  - Order, calculate, and assess calorie counts as needed  - Recommend, monitor, and adjust tube feedings and TPN/PPN based on assessed needs  - Assess need for intravenous fluids  - Provide specific nutrition/hydration education as appropriate  - Include patient/family/caregiver in decisions related to nutrition  Outcome: Progressing

## 2021-04-27 NOTE — CASE MANAGEMENT
Informed by Ketan Head surgery PAC that patient will need wound VAC changes Tu, Th, Sat but need two people to complete the wound care  Rosalie Livingston at [place] [facility], [phone] to discuss  That agency is NOT able to send two nurses to home for wound care  Called and spoke to patient to ask if he had found friend or family able to assist with his wound care and he replied he does not have someone to help and does not want his 26 yo son to help  Discussed with Ketan Head who will speak to patient  As of now this is not a safe dc since patient does not have anyone to help the VNA  Surgery will talk to patient about finding family/friend to help pr consider SNF/subacute for wound healing  Updated RN and told Robbi Hackett [facility] no dc today

## 2021-04-27 NOTE — CASE MANAGEMENT
Wound VAC was delivered to patient  He signed the [facility] delivery form which was faxed back to [facility]  Surgery and RN aware

## 2021-04-27 NOTE — CASE MANAGEMENT
VAC paperwork was completed on 4/26/21 and submitted to [facility]  VAC has been approved and is ready for delivery to patient  Discussed with Jayna HEIN who will inform CM if patient to discharge today and when to deliver [facility] to patient room

## 2021-04-28 LAB
GLUCOSE SERPL-MCNC: 133 MG/DL (ref 65–140)
GLUCOSE SERPL-MCNC: 171 MG/DL (ref 65–140)
GLUCOSE SERPL-MCNC: 196 MG/DL (ref 65–140)
GLUCOSE SERPL-MCNC: 201 MG/DL (ref 65–140)

## 2021-04-28 PROCEDURE — 99024 POSTOP FOLLOW-UP VISIT: CPT | Performed by: SURGERY

## 2021-04-28 PROCEDURE — 82948 REAGENT STRIP/BLOOD GLUCOSE: CPT

## 2021-04-28 RX ORDER — HYDROMORPHONE HYDROCHLORIDE 2 MG/1
2 TABLET ORAL EVERY 4 HOURS PRN
Status: DISCONTINUED | OUTPATIENT
Start: 2021-04-28 | End: 2021-04-29 | Stop reason: HOSPADM

## 2021-04-28 RX ORDER — GABAPENTIN 300 MG/1
300 CAPSULE ORAL 3 TIMES DAILY
Status: DISCONTINUED | OUTPATIENT
Start: 2021-04-28 | End: 2021-04-29 | Stop reason: HOSPADM

## 2021-04-28 RX ORDER — HYDROMORPHONE HYDROCHLORIDE 2 MG/1
4 TABLET ORAL EVERY 4 HOURS PRN
Status: DISCONTINUED | OUTPATIENT
Start: 2021-04-28 | End: 2021-04-29 | Stop reason: HOSPADM

## 2021-04-28 RX ADMIN — NYSTATIN: 100000 POWDER TOPICAL at 08:14

## 2021-04-28 RX ADMIN — INSULIN LISPRO 4 UNITS: 100 INJECTION, SOLUTION INTRAVENOUS; SUBCUTANEOUS at 12:10

## 2021-04-28 RX ADMIN — METHOCARBAMOL TABLETS 750 MG: 750 TABLET, COATED ORAL at 18:01

## 2021-04-28 RX ADMIN — CARVEDILOL 6.25 MG: 6.25 TABLET, FILM COATED ORAL at 08:13

## 2021-04-28 RX ADMIN — TICAGRELOR 90 MG: 90 TABLET ORAL at 08:13

## 2021-04-28 RX ADMIN — CARVEDILOL 6.25 MG: 6.25 TABLET, FILM COATED ORAL at 18:01

## 2021-04-28 RX ADMIN — INSULIN LISPRO 4 UNITS: 100 INJECTION, SOLUTION INTRAVENOUS; SUBCUTANEOUS at 21:20

## 2021-04-28 RX ADMIN — DOCUSATE SODIUM AND SENNOSIDES 1 TABLET: 8.6; 5 TABLET ORAL at 21:19

## 2021-04-28 RX ADMIN — GABAPENTIN 200 MG: 100 CAPSULE ORAL at 08:16

## 2021-04-28 RX ADMIN — ACETAMINOPHEN 975 MG: 325 TABLET ORAL at 05:36

## 2021-04-28 RX ADMIN — METHOCARBAMOL TABLETS 750 MG: 750 TABLET, COATED ORAL at 23:55

## 2021-04-28 RX ADMIN — FLUCONAZOLE 200 MG: 200 TABLET ORAL at 08:14

## 2021-04-28 RX ADMIN — CHLORHEXIDINE GLUCONATE 0.12% ORAL RINSE 15 ML: 1.2 LIQUID ORAL at 08:14

## 2021-04-28 RX ADMIN — INSULIN LISPRO 4 UNITS: 100 INJECTION, SOLUTION INTRAVENOUS; SUBCUTANEOUS at 16:10

## 2021-04-28 RX ADMIN — HYDROMORPHONE HYDROCHLORIDE 4 MG: 2 TABLET ORAL at 18:07

## 2021-04-28 RX ADMIN — NYSTATIN: 100000 POWDER TOPICAL at 17:52

## 2021-04-28 RX ADMIN — METHOCARBAMOL TABLETS 750 MG: 750 TABLET, COATED ORAL at 05:36

## 2021-04-28 RX ADMIN — METHOCARBAMOL TABLETS 750 MG: 750 TABLET, COATED ORAL at 12:21

## 2021-04-28 RX ADMIN — ENOXAPARIN SODIUM 60 MG: 60 INJECTION SUBCUTANEOUS at 21:20

## 2021-04-28 RX ADMIN — GABAPENTIN 300 MG: 300 CAPSULE ORAL at 21:20

## 2021-04-28 RX ADMIN — ACETAMINOPHEN 975 MG: 325 TABLET ORAL at 16:11

## 2021-04-28 RX ADMIN — ASPIRIN 81 MG: 81 TABLET, CHEWABLE ORAL at 08:13

## 2021-04-28 RX ADMIN — TICAGRELOR 90 MG: 90 TABLET ORAL at 21:20

## 2021-04-28 RX ADMIN — ENOXAPARIN SODIUM 60 MG: 60 INJECTION SUBCUTANEOUS at 08:14

## 2021-04-28 RX ADMIN — CHLORHEXIDINE GLUCONATE 0.12% ORAL RINSE 15 ML: 1.2 LIQUID ORAL at 21:21

## 2021-04-28 RX ADMIN — GABAPENTIN 300 MG: 300 CAPSULE ORAL at 16:11

## 2021-04-28 RX ADMIN — ATORVASTATIN CALCIUM 80 MG: 80 TABLET, FILM COATED ORAL at 18:01

## 2021-04-28 RX ADMIN — ACETAMINOPHEN 975 MG: 325 TABLET ORAL at 21:19

## 2021-04-28 NOTE — PROGRESS NOTES
Progress Note - General Surgery   Charma Mode 45 y o  male MRN: [id number]  Unit/Bed#: [facility] 512-01 Encounter: [id number]    Assessment:  45 y o  M with L thigh soft tissue mass s/p excision and vac placement 4/6  S/p L thigh washout and vac placement 4/17  S/p L thigh washout, debridement 4/22  S/p placement of [place] wound matrix and VAC dressing 4/27    Plan:    · VAC change vs return to non-VAC dressing 2/2 poor medial seal and complexity of VAC change for VNA  Patient to follow up in 10-14 days for VAC change in the office, evaluation of wound and possible re-application of [place] as needed  · Continue diabetic diet  · DVT ppx  · OOB/Ambulate  · CM for dispo planning    Subjective/Objective     Subjective: No acute events  Patient states VAC dressing became somewhat dislodge while using restroom  General surgery was not notified overnight  Objective:     Blood pressure 129/72, pulse 63, temperature 98 5 °F (36 9 °C), temperature source Oral, resp  rate 16, height 5' 6 75" (1 695 m), weight (!) 160 kg (352 lb 8 3 oz), SpO2 96 %  ,Body mass index is 55 63 kg/m²  Intake/Output Summary (Last 24 hours) at 4/28/2021 0857  Last data filed at 4/28/2021 0601  Gross per 24 hour   Intake 300 ml   Output 1125 ml   Net -825 ml       Invasive Devices     Peripheral Intravenous Line            Peripheral IV 04/24/21 Proximal;Right;Ventral (anterior) Forearm 3 days          Drain            Negative Pressure Wound Therapy (V A C ) Groin less than 1 day                Physical Exam:    GEN: NAD  HEENT: MMM  CV: RRR  Lung: normal effort  Ab: Soft, NT/ND  Extrem: Left thigh VAC with tenuous seal  Canister with SS drainage  Neuro:  A+Ox3, motor and sensation grossly intact    Lab, Imaging and other studies:  CBC:   No results found for: WBC, HGB, HCT, MCV, PLT, ADJUSTEDWBC, MCH, MCHC, RDW, MPV, NRBC, CMP:   No results found for: SODIUM, K, CL, CO2, ANIONGAP, BUN, CREATININE, GLUCOSE, CALCIUM, AST, ALT, ALKPHOS, PROT, BILITOT, EGFR  VTE Pharmacologic Prophylaxis: Enoxaparin (Lovenox)  VTE Mechanical Prophylaxis: sequential compression device

## 2021-04-28 NOTE — CASE MANAGEMENT
Returned call to Tammie Jamison, [facility], [phone], and explained that wound [facility]  Dressing is not holding the wound and surgery is working on plan for wound care  CM to call [facility] when we have a discharge date and with plan for wound care

## 2021-04-28 NOTE — QUICK NOTE
Bedside VAC assessment:  -  1 VAC black sponge removed today at bedside  Three pieces of Adaptic removed as well  This was secondary to consistent VAC leaking  The Phoneix dressing remained intact  Patient had  Adaptic then placed over top of the Phoneix dressing  Total of 4 pieces intact were placed over top  Then a trauma ABD was placed  A remaining piece of Adaptic was placed over the partially closed portion of the lateral aspect of the wound  Total of Adaptic would be 5 pieces  Patient then had Kerlix wrapped around the wound with an Ace wrap over top  Anchored with tape  Patient tolerated procedure well with no immediate complications  Time-out was initiated prior to procedure

## 2021-04-28 NOTE — PROGRESS NOTES
Progress Note - Acute Pain Service    David Gonzalez 45 y o  male MRN: [id number]  Unit/Bed#: [facility] 512-01 Encounter: [id number]      Assessment:   Principal Problem:    Left groin mass  Active Problems:    CAD (coronary artery disease)    Class 3 severe obesity due to excess calories with serious comorbidity and body mass index (BMI) of 50 0 to 59 9 in [facility])    David Gonzalez is a 45 y o  male   Admitted on 4/6/21 for mass excision of hidradenitis in the left proximal thigh with VAC dressing placement   Patient's pain has been difficult to control, therefore APS consulted  Plan:    Continue Tylenol 975 mg p o  q 8 hours scheduled   Decrease Dilaudid to 2 mg p o  q 4 hours p r n  moderate pain   Decrease Dilaudid to 4 mg p o  q 4 hours p r n  severe pain   Discontinue IV Dilaudid  May use 1 time doses for bedside procedures   Increase gabapentin to 300 mg p o  t i d  scheduled   Continue Robaxin 750 mg p o  q 6 hours scheduled   Continue bowel regimen to avoid opioid induced constipation   At discharge, suggest continue Tylenol, gabapentin, Robaxin and bowel regimen as above   At discharge, suggest Dilaudid 2 mg tablets, 1-2 p o  Q 4 hours p r n  moderate to severe pain for 5-7 days or until follow-up  APS will sign off at this time  Thank you for the consult  All opioids and other analgesics to be written at discretion of primary team  Please call  / [phone] or [facility] Acute Pain Service - B (/ between [phone] and on weekends) with questions or concerns    Pain History  Current pain location(s):   Left leg  Pain Scale:    4/10  Quality:  burning  24 hour history:  Patient's pain is improved significantly over the past several days  Using minimal opioid pain medications  Opioid requirement previous 24 hours:   Dilaudid 6 mg p o , Dilaudid 1 mg IV      Meds/Allergies   all current active meds have been reviewed, current meds:   Current Facility-Administered Medications   Medication Dose Route Frequency    acetaminophen (TYLENOL) tablet 975 mg  975 mg Oral Q8H [address]    aspirin chewable tablet 81 mg  81 mg Oral Daily    atorvastatin (LIPITOR) tablet 80 mg  80 mg Oral Daily With Dinner    bisacodyl (DULCOLAX) EC tablet 5 mg  5 mg Oral Daily PRN    carvedilol (COREG) tablet 6 25 mg  6 25 mg Oral BID With Meals    chlorhexidine (PERIDEX) 0 12 % oral rinse 15 mL  15 mL Swish & Spit Q12H [address]    enoxaparin (LOVENOX) subcutaneous injection 60 mg  60 mg Subcutaneous Q12H [address]    fluconazole (DIFLUCAN) tablet 200 mg  200 mg Oral Daily    gabapentin (NEURONTIN) capsule 200 mg  200 mg Oral TID    glycopyrrolate (ROBINUL) injection 0 2 mg  0 2 mg Intravenous Q4H PRN    HYDROmorphone (DILAUDID) injection 0 5 mg  0 5 mg Intravenous Q3H PRN    HYDROmorphone (DILAUDID) tablet 4 mg  4 mg Oral Q4H PRN    Or    HYDROmorphone (DILAUDID) tablet 6 mg  6 mg Oral Q4H PRN    insulin lispro (HumaLOG) 100 units/mL subcutaneous injection 4-20 Units  4-20 Units Subcutaneous 4x Daily (AC & HS)    methocarbamol (ROBAXIN) tablet 750 mg  750 mg Oral Q6H SHAZIA    naloxone (NARCAN) injection 0 1 mg  0 1 mg Intravenous Q3 min PRN    nystatin (MYCOSTATIN) powder   Topical BID    polyethylene glycol (MIRALAX) packet 17 g  17 g Oral Daily    senna-docusate sodium (SENOKOT S) 8 6-50 mg per tablet 1 tablet  1 tablet Oral HS    sodium chloride (OCEAN) 0 65 % nasal spray 1 spray  1 spray Each Nare Q1H PRN    ticagrelor (BRILINTA) tablet 90 mg  90 mg Oral Q12H [address]    and PTA meds:   Prior to Admission Medications   Prescriptions Last Dose Informant Patient Reported? Taking?    Omega-3 Fatty Acids (fish oil) 1,000 mg 4/5/2021 at 0500  No Yes   Sig: Take 1 capsule (1,000 mg total) by mouth 2 (two) times a day   Ticagrelor (BRILINTA PO) 4/5/2021 at 0500  Yes Yes   Sig: Take by mouth   amLODIPine (NORVASC) 10 mg tablet 4/5/2021 at 0500  No Yes   Sig: take 1 tablet by mouth once daily   aspirin (ECOTRIN LOW STRENGTH) 81 mg EC tablet 4/1/2021  No Yes   Sig: Take 1 tablet (81 mg total) by mouth daily   atorvastatin (LIPITOR) 80 mg tablet 4/4/2021 at 1900  No Yes   Sig: Take 1 tablet (80 mg total) by mouth daily   carvedilol (COREG) 6 25 mg tablet 4/5/2021 at 0500  No Yes   Sig: Take 1 tablet (6 25 mg total) by mouth 2 (two) times a day with meals   clindamycin (CLEOCIN) 150 mg capsule 4/5/2021 at 1700  No Yes   Sig: Take 1 capsule (150 mg total) by mouth every 12 (twelve) hours for 6 days   glimepiride (AMARYL) 4 mg tablet 4/5/2021 at 0500  No Yes   Sig: Take 1 tablet (4 mg total) by mouth daily with breakfast   lisinopril (ZESTRIL) 10 mg tablet 4/5/2021 at 0500  No Yes   Sig: Take 1 tablet (10 mg total) by mouth daily   metFORMIN (GLUCOPHAGE) 1000 MG tablet 4/5/2021 at 0500  No Yes   Sig: Take 1 tablet (1,000 mg total) by mouth 2 (two) times a day with meals      Facility-Administered Medications: None       Allergies   Allergen Reactions    Amoxicillin Hives       Objective     Temp:  [98 3 °F (36 8 °C)-98 5 °F (36 9 °C)] 98 5 °F (36 9 °C)  HR:  [61-63] 63  Resp:  [16-20] 16  BP: (120-142)/(57-81) 129/72    Physical Exam  Vitals signs and nursing note reviewed  Constitutional:       General: He is awake  He is not in acute distress  Skin:     General: Skin is warm and dry  Neurological:      Mental Status: He is alert and oriented to person, place, and time  GCS: GCS eye subscore is 4  GCS verbal subscore is 5  GCS motor subscore is 6  Psychiatric:         Behavior: Behavior is cooperative           Lab Results:   Results from last 7 days   Lab Units 04/27/21  0518   WBC Thousand/uL 9 93   HEMOGLOBIN g/dL 8 2*   HEMATOCRIT % 26 0*   PLATELETS Thousands/uL 516*      Results from last 7 days   Lab Units 04/23/21  0455   POTASSIUM mmol/L 4 4   CHLORIDE mmol/L 103   CO2 mmol/L 32   BUN mg/dL 17   CREATININE mg/dL 1 02   CALCIUM mg/dL 8 6       Imaging Studies: I have personally reviewed pertinent reports  EKG, Pathology, and Other Studies: I have personally reviewed pertinent reports  Counseling / Coordination of Care  Total floor / unit time spent today 30 minutes  Greater than 50% of total time was spent with the patient and / or family counseling and / or coordination of care  A description of the counseling / coordination of care:  Patient interview, physical examination, review of medical record, review of imaging and laboratory data, development of pain management plan, discussion of pain management plan with patient and primary service  Please note that the APS provides consultative services regarding pain management only  With the exception of ketamine and epidural infusions and except when indicated, final decisions regarding starting or changing doses of analgesic medications are at the discretion of the consulting service  Off hours consultation and/or medication management is generally not available      Hugo Cole PA-C  Acute Pain Service

## 2021-04-29 ENCOUNTER — HOSPITAL ENCOUNTER (EMERGENCY)
Facility: HOSPITAL | Age: 41
Discharge: HOME | End: 2021-04-29
Attending: EMERGENCY MEDICINE | Admitting: EMERGENCY MEDICINE
Payer: COMMERCIAL

## 2021-04-29 VITALS
DIASTOLIC BLOOD PRESSURE: 70 MMHG | BODY MASS INDEX: 49.44 KG/M2 | TEMPERATURE: 98.2 F | HEART RATE: 60 BPM | RESPIRATION RATE: 18 BRPM | WEIGHT: 315 LBS | HEIGHT: 67 IN | SYSTOLIC BLOOD PRESSURE: 129 MMHG | OXYGEN SATURATION: 97 %

## 2021-04-29 VITALS
OXYGEN SATURATION: 99 % | TEMPERATURE: 99.1 F | HEIGHT: 67 IN | WEIGHT: 315 LBS | SYSTOLIC BLOOD PRESSURE: 150 MMHG | RESPIRATION RATE: 20 BRPM | DIASTOLIC BLOOD PRESSURE: 65 MMHG | BODY MASS INDEX: 49.44 KG/M2 | HEART RATE: 80 BPM

## 2021-04-29 DIAGNOSIS — Z48.01 ENCOUNTER FOR SURGICAL WOUND DRESSING CHANGE: Primary | ICD-10-CM

## 2021-04-29 LAB
GLUCOSE SERPL-MCNC: 110 MG/DL (ref 65–140)
GLUCOSE SERPL-MCNC: 153 MG/DL (ref 65–140)

## 2021-04-29 PROCEDURE — 99284 EMERGENCY DEPT VISIT MOD MDM: CPT | Performed by: EMERGENCY MEDICINE

## 2021-04-29 PROCEDURE — 99024 POSTOP FOLLOW-UP VISIT: CPT | Performed by: PHYSICIAN ASSISTANT

## 2021-04-29 PROCEDURE — NC001 PR NO CHARGE: Performed by: SURGERY

## 2021-04-29 PROCEDURE — 82948 REAGENT STRIP/BLOOD GLUCOSE: CPT

## 2021-04-29 PROCEDURE — 99283 EMERGENCY DEPT VISIT LOW MDM: CPT

## 2021-04-29 RX ORDER — GABAPENTIN 300 MG/1
300 CAPSULE ORAL 3 TIMES DAILY
Qty: 42 CAPSULE | Refills: 0 | Status: ON HOLD | OUTPATIENT
Start: 2021-04-29 | End: 2021-07-06 | Stop reason: ALTCHOICE

## 2021-04-29 RX ORDER — FLUCONAZOLE 200 MG/1
200 TABLET ORAL DAILY
Qty: 6 TABLET | Refills: 0 | Status: SHIPPED | OUTPATIENT
Start: 2021-04-30 | End: 2021-05-06

## 2021-04-29 RX ORDER — METHOCARBAMOL 750 MG/1
750 TABLET, FILM COATED ORAL EVERY 6 HOURS SCHEDULED
Qty: 30 TABLET | Refills: 0 | Status: ON HOLD | OUTPATIENT
Start: 2021-04-29 | End: 2021-07-06 | Stop reason: ALTCHOICE

## 2021-04-29 RX ORDER — OXYCODONE HYDROCHLORIDE 5 MG/1
5 TABLET ORAL ONCE
Status: COMPLETED | OUTPATIENT
Start: 2021-04-29 | End: 2021-04-29

## 2021-04-29 RX ORDER — ACETAMINOPHEN 325 MG/1
975 TABLET ORAL EVERY 8 HOURS SCHEDULED
Refills: 0
Start: 2021-04-29 | End: 2021-11-15

## 2021-04-29 RX ORDER — HYDROMORPHONE HYDROCHLORIDE 4 MG/1
4 TABLET ORAL EVERY 4 HOURS PRN
Qty: 30 TABLET | Refills: 0 | Status: SHIPPED | OUTPATIENT
Start: 2021-04-29 | End: 2021-05-09

## 2021-04-29 RX ORDER — DOXYCYCLINE HYCLATE 100 MG/1
100 TABLET, DELAYED RELEASE ORAL 2 TIMES DAILY
Qty: 10 TABLET | Refills: 0 | Status: SHIPPED | OUTPATIENT
Start: 2021-04-29 | End: 2021-05-04

## 2021-04-29 RX ADMIN — METHOCARBAMOL TABLETS 750 MG: 750 TABLET, COATED ORAL at 05:53

## 2021-04-29 RX ADMIN — ASPIRIN 81 MG: 81 TABLET, CHEWABLE ORAL at 08:48

## 2021-04-29 RX ADMIN — CARVEDILOL 6.25 MG: 6.25 TABLET, FILM COATED ORAL at 06:40

## 2021-04-29 RX ADMIN — TICAGRELOR 90 MG: 90 TABLET ORAL at 08:48

## 2021-04-29 RX ADMIN — OXYCODONE HYDROCHLORIDE 5 MG: 5 TABLET ORAL at 18:34

## 2021-04-29 RX ADMIN — ACETAMINOPHEN 975 MG: 325 TABLET ORAL at 05:53

## 2021-04-29 RX ADMIN — GABAPENTIN 300 MG: 300 CAPSULE ORAL at 08:48

## 2021-04-29 RX ADMIN — FLUCONAZOLE 200 MG: 200 TABLET ORAL at 08:48

## 2021-04-29 RX ADMIN — NYSTATIN: 100000 POWDER TOPICAL at 08:50

## 2021-04-29 RX ADMIN — METHOCARBAMOL TABLETS 750 MG: 750 TABLET, COATED ORAL at 11:37

## 2021-04-29 RX ADMIN — ENOXAPARIN SODIUM 60 MG: 60 INJECTION SUBCUTANEOUS at 08:48

## 2021-04-29 NOTE — DISCHARGE SUMMARY
Discharge Summary - Army Maki 45 y o  male MRN: [id number]    Unit/Bed#: [facility] 512-01 Encounter: [id number]    Admission Date:   Admission Orders (From admission, onward)     Ordered        04/06/21 1348  Inpatient Admission  Once                     Admitting Diagnosis: Skin lesion of left leg [L98 9]    HPI: Per Mauricio Walsh, "Army Maki is a 45 y o  male who presents for Follow-up (3rd f/u b/l groin wounds and pilonidal cyst all draining  Left lower extremity soft tissue lesion and hidradenitis -- will plan on excision of mass and wide excision of hidradenitis  Now with cellulitis  -- start outpt abx  Given enlarging mass and worsening episodes of hidradenitis, will plan on excision next week  Will place vac at conclusion of procedure and obtain plastics consult  He understands the risks of the procedure including possible cardiac issues given MI last year "    Procedures Performed: No orders of the defined types were placed in this encounter  Summary of Hospital Course:   Patient is a 44-year-old male presents for elective surgery for skin lesion on left leg  Patient hospital course complicated secondary to requiring ICU care due to inability to extubate patient  He required ICU care until was able to be extubated  He was then subsequently transferred out of the ICU and continued with wound care  Patient had a complicated wound that required multiple dressing changes as well as VAC placement  He was also consulted to Cardiology during his hospital course will continue the patient on his home medications and recommended continuation of his lisinopril and amlodipine upon discharge  Full detailed report of the patient's hospital course please refer to hospital stay  Upon discharge the patient was informed of his incidental findings  His complicated wound care plan was discussed at bedside as well as right now on his DC instructions with faxing to [facility]    He would have outpatient follow-up with the surgical team on 05/03/2021  He would also have visiting nursing be initiated on 04/31/2021  Again instructions reviewed at bedside prior to discharge  He was given a 5 day course of antibiotics for possible infected hematoma  Patient was noted to be afebrile upon discharge in his vitals are stable  Patient was up and ambulatory  Significant Findings, Care, Treatment and Services Provided: No results found  Complications: no complications    Discharge Diagnosis:   Patient Active Problem List   Diagnosis    Abnormal liver enzymes    CAD (coronary artery disease)    Dyslipidemia    HTN (hypertension)    Morbid obesity ([facility] )    Pilonidal cyst    DM type 2, uncontrolled, with neuropathy ([place] [address] )    Type 1 non-ST elevation myocardial infarction (NSTEMI) ([place] [address] )    Left groin mass    Skin abscess    Encounter for other preprocedural examination    Morbid (severe) obesity due to excess calories (HCC)    Class 3 severe obesity due to excess calories with serious comorbidity and body mass index (BMI) of 50 0 to 59 9 in adult ([facility] )    Hypertriglyceridemia    Dyslipidemia (high LDL; low HDL)         Resolved Problems  Date Reviewed: 4/19/2021    None          Condition at Discharge: fair         Discharge instructions/Information to patient and family:   See after visit summary for information provided to patient and family  Provisions for Follow-Up Care:  See after visit summary for information related to follow-up care and any pertinent home health orders  PCP: Faye Anders DO    Disposition: Home    Planned Readmission: No      Discharge Statement   I spent 23 minutes discharging the patient  This time was spent on the day of discharge  I had direct contact with the patient on the day of discharge  Additional documentation is required if more than 30 minutes were spent on discharge       Discharge Medications:  See after visit summary for reconciled discharge medications provided to patient and family

## 2021-04-29 NOTE — QUICK NOTE
Wound dressing change at bedside  Placed Adaptic over the [facility] dressing and then placed trauma ABD pad  Reinforced with Kerlix and Ace wrap  Also at this time patient noted to have hematoma with drainage in previous repaired suture line on the partial closure on the anterior portion of the left thigh wound  This was packed with 1 in packing  Patient tolerated dressing change well with no immediate complications

## 2021-04-29 NOTE — INCIDENTAL FINDINGS
The following findings require follow up:  Radiographic finding   Findin  9 mm left thyroid nodule ()  No follow-up is needed  Follow up required: Yes   Follow up should be done within 2-4 week(s)    Please notify the following clinician to assist with the follow up:   Primary Care Provider  Discussed with patient at bedside

## 2021-04-29 NOTE — ED TRIAGE NOTES
Patient states he was DC this AM from [address] [place] [address]  Got home moving around went to [facility] and realized excessive bleeding/oozing  Thinks wound pulled apart

## 2021-04-29 NOTE — CONSULTS
Consultation- General Surgery  Jorden Suazo 45 y o  male MRN: [id number]  Unit/Bed#[facility] Encounter: [id number]        Assessment/Plan     Assessment:  45 M w/ recent admission for management of hidradenitis of L groin status post multiple washout procedures and aggressive wound care  Plan:  Redressed wound in ED  Wound care nurse to see patient tomorrow  If presentation again prior will consider re-admission    History of Present Illness     HPI:  Jorden Suazo is a 45 y o  male was recently admitted to the acute care surgery service for management of left lower extremity soft tissue lesion and hidradenitis status post wide excision with multiple washouts and aggressive wound care  Patient was discharged today with plan to have visiting nursing follow up with him tomorrow and follow-up appointment following Monday  Patient states when he got home he felt like his dressing was coming off and was draining fluid  He decided to come back to the emergency department to have it evaluated  Review of Systems   Constitutional: Negative for chills and fever  HENT: Negative for ear pain and sore throat  Eyes: Negative for pain and visual disturbance  Respiratory: Negative for cough and shortness of breath  Cardiovascular: Negative for chest pain and palpitations  Gastrointestinal: Negative for abdominal pain and vomiting  Genitourinary: Negative for dysuria and hematuria  Musculoskeletal: Negative for arthralgias and back pain  Skin: Negative for color change and rash  Neurological: Negative for seizures and syncope  Psychiatric/Behavioral: Negative for agitation and behavioral problems  All other systems reviewed and are negative    Historical Information   Past Medical History:   Diagnosis Date    Coronary artery disease     Diabetes mellitus ([facility] )     Heart disease     CAD   s/p ptca with 1 stent 2012    Hidradenitis suppurativa     groin    Hyperlipidemia     Hypertension  MI (myocardial infarction) [facility] )     " silent " M I  in the past    Morbid obesity with BMI of 50 0-59 9, adult ([facility] )     Myocardial infarction ([facility] )     Nicotine dependence     Obesity     Pilonidal cyst      Past Surgical History:   Procedure Laterality Date    CORONARY ANGIOPLASTY WITH STENT PLACEMENT      INCISION AND DRAINAGE OF WOUND N/A 1/24/2017    Procedure: INCISION AND DRAINAGE (I&D) BUTTOCK, PILONIDAL CYST;  Surgeon: Ian Lai MD;  Location: [address] [place] [address];  Service:    [employer] LEG SURGERY Left     I&D of left leg 2012    TX EXC SKIN BENIG >4 CM TRUNK,ARM,LEG Left 4/6/2021    Procedure: EXCISION THIGH MASS;  Surgeon: Momo Beckham MD;  [facility];  Service: General    TX NEG PRESS WOUND TX, < 50 CM Left 4/6/2021    Procedure: APPLICATION VAC DRESSING THIGH;  Surgeon: Momo Beckham MD;  [facility];  Service: General    WOUND DEBRIDEMENT Left 4/17/2021    Procedure: DEBRIDEMENT WOUND AND DRESSING CHANGE ([address] OUT); Surgeon: Adi Villalta DO;  [facility];  Service: General    WOUND DEBRIDEMENT Left 4/22/2021    Procedure: DEBRIDEMENT LOWER EXTREMITY [facility] OUT), left groin and thigh;  Surgeon: Momo Beckham MD;  [facility];  Service: General     Social History   Social History     Substance and Sexual Activity   Alcohol Use Not Currently    Comment: rarely     Social History     Substance and Sexual Activity   Drug Use No     Social History     Tobacco Use   Smoking Status Former Smoker    Packs/day: 1 00    Years: 20 00    Pack years: 20 00    Types: Cigarettes   Smokeless Tobacco Never Used     Family History:   Family History   Problem Relation Age of Onset    Heart disease Father        Meds/Allergies   PTA meds:   Prior to Admission Medications   Prescriptions Last Dose Informant Patient Reported? Taking?    HYDROmorphone (DILAUDID) 4 mg tablet   No No   Sig: Take 1 tablet (4 mg total) by mouth every 4 (four) hours as needed for severe pain for up to 10 daysMax Daily Amount: 24 mg   Omega-3 Fatty Acids (fish oil) 1,000 mg   No No   Sig: Take 1 capsule (1,000 mg total) by mouth 2 (two) times a day   Ticagrelor (BRILINTA PO)   Yes No   Sig: Take by mouth   acetaminophen (TYLENOL) 325 mg tablet   No No   Sig: Take 3 tablets (975 mg total) by mouth every 8 (eight) hours   amLODIPine (NORVASC) 10 mg tablet   No No   Sig: take 1 tablet by mouth once daily   aspirin (ECOTRIN LOW STRENGTH) 81 mg EC tablet   No No   Sig: Take 1 tablet (81 mg total) by mouth daily   atorvastatin (LIPITOR) 80 mg tablet   No No   Sig: Take 1 tablet (80 mg total) by mouth daily   carvedilol (COREG) 6 25 mg tablet   No No   Sig: Take 1 tablet (6 25 mg total) by mouth 2 (two) times a day with meals   doxycycline (DORYX) 100 MG EC tablet   No No   Sig: Take 1 tablet (100 mg total) by mouth 2 (two) times a day for 5 days   fluconazole (DIFLUCAN) 200 mg tablet   No No   Sig: Take 1 tablet (200 mg total) by mouth daily for 6 days   gabapentin (NEURONTIN) 300 mg capsule   No No   Sig: Take 1 capsule (300 mg total) by mouth 3 (three) times a day for 14 days   glimepiride (AMARYL) 4 mg tablet   No No   Sig: Take 1 tablet (4 mg total) by mouth daily with breakfast   lisinopril (ZESTRIL) 10 mg tablet   No No   Sig: Take 1 tablet (10 mg total) by mouth daily   metFORMIN (GLUCOPHAGE) 1000 MG tablet   No No   Sig: Take 1 tablet (1,000 mg total) by mouth 2 (two) times a day with meals   methocarbamol (ROBAXIN) 750 mg tablet   No No   Sig: Take 1 tablet (750 mg total) by mouth every 6 (six) hours      Facility-Administered Medications: None     Allergies   Allergen Reactions    Amoxicillin Hives       Objective   First Vitals:   Blood Pressure: 150/65 (04/29/21 1715)  Pulse: 80 (04/29/21 1715)  Temperature: 99 1 °F (37 3 °C) (04/29/21 1715)  Temp Source: Oral (04/29/21 1715)  Respirations: 20 (04/29/21 1715)  Height: 5' 7" (170 2 cm) (04/29/21 1715)  Weight - Scale: (!) 160 kg (352 lb 11 8 oz) (04/29/21 1715)  SpO2: 99 % (04/29/21 1715)    Current Vitals:   Blood Pressure: 150/65 (04/29/21 1715)  Pulse: 80 (04/29/21 1715)  Temperature: 99 1 °F (37 3 °C) (04/29/21 1715)  Temp Source: Oral (04/29/21 1715)  Respirations: 20 (04/29/21 1715)  Height: 5' 7" (170 2 cm) (04/29/21 1715)  Weight - Scale: (!) 160 kg (352 lb 11 8 oz) (04/29/21 1715)  SpO2: 99 % (04/29/21 1715)    No intake or output data in the 24 hours ending 04/29/21 1856    Invasive Devices     None                 Physical Exam  Vitals signs reviewed  Constitutional:       General: He is not in acute distress  Appearance: He is not toxic-appearing  HENT:      Head: Normocephalic and atraumatic  Right Ear: External ear normal       Left Ear: External ear normal       Nose: Nose normal       Mouth/Throat:      Mouth: Mucous membranes are moist       Pharynx: Oropharynx is clear  Eyes:      Extraocular Movements: Extraocular movements intact  Conjunctiva/sclera: Conjunctivae normal       Pupils: Pupils are equal, round, and reactive to light  Neck:      Musculoskeletal: Neck supple  No neck rigidity  Cardiovascular:      Rate and Rhythm: Normal rate and regular rhythm  Pulmonary:      Effort: Pulmonary effort is normal  No respiratory distress  Abdominal:      General: Abdomen is flat  There is no distension  Genitourinary:     Comments: Dressings without excess drainage or purulence  no bleeding  [place] intact  Ace bandages coming down from coverage of the wound  Musculoskeletal: Normal range of motion  General: No swelling or deformity  Skin:     General: Skin is warm and dry  Capillary Refill: Capillary refill takes less than 2 seconds  Findings: No erythema  Neurological:      General: No focal deficit present  Mental Status: He is oriented to person, place, and time     Psychiatric:         Mood and Affect: Mood normal          Behavior: Behavior normal            Lab Results: I have personally reviewed pertinent lab results  Imaging: I have personally reviewed pertinent reports  EKG, Pathology, and Other Studies: I have personally reviewed pertinent reports        Code Status: Prior  Advance Directive and Living Will:      Power of :    POLST:

## 2021-04-29 NOTE — DISCHARGE INSTRUCTIONS
DISCHARGE INSTRUCTIONS    Follow Up: Follow up with [facility] on 5/3/21 for wound evaluation  -Visiting nurses to do dressing changes every other day  First dressing change will be 4/31/21    Diet: You may resume a regular diet    Pain: Plan:   · Continue Tylenol 975 mg every 8 hours  · Continue Dilaudid 4 mg p o  q 4 hours p r n  moderate pain  · Continue Neurontin 300 mg three times a day scheduled  · Continue Robaxin 750 mg every 6 hours scheduled  · Dilaudid 4 mg tablets, 1 tablet every 3 hours as needed for moderate to severe pain  Shower: You may shower over the wound prior to dressing changes by visiting nurses  Remove dressings prior to visiting nurse, shower and let water and soap run down wound, pat dry  Do not probe into the wound  Do not bathe or use a pool or hot tub until cleared by the physician  Activity: As tolerated  You may go up and down stairs, walk as much as you are comfortable, but walk at least 3 times each day  Do not lift anything heavier than 15 pounds for at least 2-4 weeks, unless cleared by the doctor  Driving: Do not drive or make any important decisions while on narcotic pain medication  Generally, you may drive when your off all narcotic pain medications  Medications: Resume all of your previous medications, unless told otherwise by the doctor  You do not need to take the narcotic pain medications unless you are having significant pain and discomfort  Call the office: If you are experiencing any of the following, fevers above 101 5° or chills, significant nausea or vomiting, increase in abdominal pain, if the wound develops drainage and/or is excessive redness around the wound, or if you have significant diarrhea or other worsening symptoms  Dressing Changes: For posterior left thigh wound:  1  Carefully remove all dressing aside from [facility] dressing underneath Adaptic; may utilize saline to remove the adaptic as this is removed     2  Assess and make sure [place] dressing is intact  3  Then carefully adhere adaptic over top of the wound base  4  Then place an Trauma ABD pad over top the adaptic; reinforce with extra abd pads as needed  5  After ABDs are placed then wrap the leg with Kerlix  6  ACE wrap to complete dressing  7  Then utilize underwear from hospital to keep dressing in place  8  Use tape as needed    For Left anterior superior suture line wound  1  Pack every other day with 1 inch packing and monitor output    Right pannus wound  1   Monitor for increased purulent output

## 2021-04-29 NOTE — DISCHARGE INSTRUCTIONS
Follow-up with your surgery team as scheduled  Come back to emergency department if you have fevers, new or worsening symptoms

## 2021-04-29 NOTE — CASE MANAGEMENT
Discussed patient with MELVINA Warren  Patient to dc home today-no wound VAC and needs only one person to do dressing change  He willl need [facility] to see him at home Saturday, Wed and Friday  Patient to go to surgeon office on Monday 5/3 for wound check and dressing change  Above information was sent to [facility]  And CM will fax AVS when complete

## 2021-04-29 NOTE — ED PROVIDER NOTES
History  Chief Complaint   Patient presents with    Wound Dehiscence     45 y o  M history of diabetes, hypertension, hyperlipidemia, myocardial infarction, status post L thigh soft tissue mass s/p excision and vac placement 4/6  S/p L thigh washout and vac placement 4/17 S/p L thigh washout, debridement 4/22, discharged today  Presents because his wound dressing came off and he couldn't put it back  Is unsure if the wound opened up more than previous  Says shortly after getting home his wound dressing came off  Patient feels like things have changed there  Patient is unable to put the wound dressing on by himself  Has no one there to help home  Attempted to call the [facility] surgery number  Was unable to speak to someone  Denies fevers , chills, nausea, vomiting  Wound Check         Prior to Admission Medications   Prescriptions Last Dose Informant Patient Reported? Taking?    HYDROmorphone (DILAUDID) 4 mg tablet   No No   Sig: Take 1 tablet (4 mg total) by mouth every 4 (four) hours as needed for severe pain for up to 10 daysMax Daily Amount: 24 mg   Omega-3 Fatty Acids (fish oil) 1,000 mg   No No   Sig: Take 1 capsule (1,000 mg total) by mouth 2 (two) times a day   Ticagrelor (BRILINTA PO)   Yes No   Sig: Take by mouth   acetaminophen (TYLENOL) 325 mg tablet   No No   Sig: Take 3 tablets (975 mg total) by mouth every 8 (eight) hours   amLODIPine (NORVASC) 10 mg tablet   No No   Sig: take 1 tablet by mouth once daily   aspirin (ECOTRIN LOW STRENGTH) 81 mg EC tablet   No No   Sig: Take 1 tablet (81 mg total) by mouth daily   atorvastatin (LIPITOR) 80 mg tablet   No No   Sig: Take 1 tablet (80 mg total) by mouth daily   carvedilol (COREG) 6 25 mg tablet   No No   Sig: Take 1 tablet (6 25 mg total) by mouth 2 (two) times a day with meals   doxycycline (DORYX) 100 MG EC tablet   No No   Sig: Take 1 tablet (100 mg total) by mouth 2 (two) times a day for 5 days   fluconazole (DIFLUCAN) 200 mg tablet   No No   Sig: Take 1 tablet (200 mg total) by mouth daily for 6 days   gabapentin (NEURONTIN) 300 mg capsule   No No   Sig: Take 1 capsule (300 mg total) by mouth 3 (three) times a day for 14 days   glimepiride (AMARYL) 4 mg tablet   No No   Sig: Take 1 tablet (4 mg total) by mouth daily with breakfast   lisinopril (ZESTRIL) 10 mg tablet   No No   Sig: Take 1 tablet (10 mg total) by mouth daily   metFORMIN (GLUCOPHAGE) 1000 MG tablet   No No   Sig: Take 1 tablet (1,000 mg total) by mouth 2 (two) times a day with meals   methocarbamol (ROBAXIN) 750 mg tablet   No No   Sig: Take 1 tablet (750 mg total) by mouth every 6 (six) hours      Facility-Administered Medications: None       Past Medical History:   Diagnosis Date    Coronary artery disease     [facility] [address] [facility]     Heart disease     CAD   s/p ptca with 1 stent 2012    Hidradenitis suppurativa     groin    Hyperlipidemia     Hypertension     MI (myocardial infarction) [facility] [address] [facility]     " silent " M I  in the past    Morbid obesity with BMI of 50 0-59 9, adult ([facility] [address] [facility]     Myocardial infarction [facility] [address] [facility]     Nicotine dependence     Obesity     Pilonidal cyst        Past Surgical History:   Procedure Laterality Date    CORONARY ANGIOPLASTY WITH STENT PLACEMENT      INCISION AND DRAINAGE OF WOUND N/A 1/24/2017    Procedure: INCISION AND DRAINAGE (I&D) BUTTOCK, PILONIDAL CYST;  Surgeon: Ian Lai MD;  Location: [address] [place] [address];  Service:    Trae Pert LEG SURGERY Left     I&D of left leg 2012    [place] EXC SKIN BENIG >4 CM TRUNK,ARM,LEG Left 4/6/2021    Procedure: EXCISION THIGH MASS;  Surgeon: Momo Beckham MD;  [facility];  Service: General    [place] NEG PRESS WOUND TX, < 50 CM Left 4/6/2021    Procedure: APPLICATION VAC DRESSING THIGH;  Surgeon: Momo Beckham MD;  [facility];  Service: General    WOUND DEBRIDEMENT Left 4/17/2021    Procedure: DEBRIDEMENT WOUND AND DRESSING CHANGE ([address] OUT);   Surgeon: Adi Villalta DO;  [facility];  Service: Sheela Jb WOUND DEBRIDEMENT Left 4/22/2021    Procedure: DEBRIDEMENT LOWER EXTREMITY Serafin Hudson OUT), left groin and thigh;  Surgeon: Fletcher Mendez MD;  [facility];  Service: General       Family History   Problem Relation Age of Onset    Heart disease Father      I have reviewed and agree with the history as documented  E-Cigarette/Vaping    E-Cigarette Use Never User      E-Cigarette/Vaping Substances    Nicotine No     THC No     CBD No     Flavoring No      Social History     Tobacco Use    Smoking status: Former Smoker     Packs/day: 1 00     Years: 20 00     Pack years: 20 00     Types: Cigarettes    Smokeless tobacco: Never Used   Substance Use Topics    Alcohol use: Not Currently     Comment: rarely    Drug use: No        Review of Systems   Skin: Positive for wound  All other systems reviewed and are negative  Physical Exam  ED Triage Vitals [04/29/21 1715]   Temperature Pulse Respirations Blood Pressure SpO2   99 1 °F (37 3 °C) 80 20 150/65 99 %      Temp Source Heart Rate Source Patient Position - Orthostatic VS BP Location FiO2 (%)   Oral Monitor Lying Left arm --      Pain Score       Worst Possible Pain             Orthostatic Vital Signs  Vitals:    04/29/21 1715   BP: 150/65   Pulse: 80   Patient Position - Orthostatic VS: Lying       Physical Exam  Vitals signs and nursing note reviewed  Constitutional:       General: He is not in acute distress  Appearance: He is well-developed  He is not diaphoretic  HENT:      Head: Normocephalic and atraumatic  Right Ear: External ear normal       Left Ear: External ear normal    Eyes:      Conjunctiva/sclera: Conjunctivae normal    Neck:      Vascular: No JVD  Trachea: No tracheal deviation  Cardiovascular:      Rate and Rhythm: Normal rate and regular rhythm  Heart sounds: Normal heart sounds  No murmur  Pulmonary:      Effort: No respiratory distress  Breath sounds: Normal breath sounds  No stridor   No wheezing or rales    Abdominal:      General: Bowel sounds are normal  There is no distension  Palpations: Abdomen is soft  There is no mass  Tenderness: There is no abdominal tenderness  There is no guarding or rebound  Genitourinary:     Comments: Deferred  Musculoskeletal:         General: Deformity (Large open wound in the left thigh  No erythema or purulent discharge noted  medial suture appears to have come undone at some point  Unsure if this is new or old  ) present  No tenderness  Skin:     General: Skin is warm and dry  Capillary Refill: Capillary refill takes less than 2 seconds  Coloration: Skin is not pale  Findings: No erythema or rash  Neurological:      Motor: No abnormal muscle tone  Coordination: Coordination normal    Psychiatric:         Behavior: Behavior normal          Thought Content: Thought content normal          Judgment: Judgment normal          ED Medications  Medications   oxyCODONE (ROXICODONE) IR tablet 5 mg (5 mg Oral Given 4/29/21 1834)       Diagnostic Studies  Results Reviewed     None                 No orders to display         Procedures  Procedures      ED Course                                       MDM  Number of Diagnoses or Management Options  Encounter for surgical wound dressing change: new and does not require workup  Diagnosis management comments: 73-year-old presenting for evaluation of his surgical wound  Discharged today  Does not look infected  Unsure what the baseline status of this wound is  Will consult General surgery  Likely just needs dressing change  Dressing changed by gen surg  Will discharge home  Follow-up as scheduled  Return precautions given         Amount and/or Complexity of Data Reviewed  Decide to obtain previous medical records or to obtain history from someone other than the patient: yes  Review and summarize past medical records: yes    Risk of Complications, Morbidity, and/or Mortality  Presenting problems: minimal  Diagnostic procedures: minimal  Management options: minimal    Patient Progress  Patient progress: stable      Disposition  Final diagnoses:   Encounter for surgical wound dressing change     Time reflects when diagnosis was documented in both MDM as applicable and the Disposition within this note     Time User Action Codes Description Comment    4/29/2021  6:59 PM Drake Marcelino [Z48 01] Encounter for surgical wound dressing change       ED Disposition     ED Disposition Condition Date/Time Comment    Discharge Stable u Apr 29, 2021  6:59 PM Julia Bojorquez discharge to home/self care              Follow-up Information     Follow up With Specialties Details Why Contact Info Additional Information    [address] [place] [facility] [address] General Surgery Call  For re-evaluation as soon as possible [phone]  [facility], [address] [place] [address], [phone]          Discharge Medication List as of 4/29/2021  7:00 PM      CONTINUE these medications which have NOT CHANGED    Details   acetaminophen (TYLENOL) 325 mg tablet Take 3 tablets (975 mg total) by mouth every 8 (eight) hours, Starting Thu 4/29/2021, No Print      amLODIPine (NORVASC) 10 mg tablet take 1 tablet by mouth once daily, Normal      aspirin (ECOTRIN LOW STRENGTH) 81 mg EC tablet Take 1 tablet (81 mg total) by mouth daily, Starting Thu 12/3/2020, Normal      atorvastatin (LIPITOR) 80 mg tablet Take 1 tablet (80 mg total) by mouth daily, Starting Mon 3/15/2021, Normal      carvedilol (COREG) 6 25 mg tablet Take 1 tablet (6 25 mg total) by mouth 2 (two) times a day with meals, Starting Mon 3/15/2021, Normal      doxycycline (DORYX) 100 MG EC tablet Take 1 tablet (100 mg total) by mouth 2 (two) times a day for 5 days, Starting Thu 4/29/2021, Until Tue 5/4/2021, Normal      fluconazole (DIFLUCAN) 200 mg tablet Take 1 tablet (200 mg total) by mouth daily for 6 days, Starting Fri 4/30/2021, Until Thu 5/6/2021, Normal      gabapentin (NEURONTIN) 300 mg capsule Take 1 capsule (300 mg total) by mouth 3 (three) times a day for 14 days, Starting Thu 4/29/2021, Until Thu 5/13/2021, Normal      glimepiride (AMARYL) 4 mg tablet Take 1 tablet (4 mg total) by mouth daily with breakfast, Starting Tue 3/9/2021, Normal      HYDROmorphone (DILAUDID) 4 mg tablet Take 1 tablet (4 mg total) by mouth every 4 (four) hours as needed for severe pain for up to 10 daysMax Daily Amount: 24 mg, Starting Thu 4/29/2021, Until Sun 5/9/2021, Normal      lisinopril (ZESTRIL) 10 mg tablet Take 1 tablet (10 mg total) by mouth daily, Starting Tue 3/2/2021, Normal      metFORMIN (GLUCOPHAGE) 1000 MG tablet Take 1 tablet (1,000 mg total) by mouth 2 (two) times a day with meals, Starting Tue 3/9/2021, Normal      methocarbamol (ROBAXIN) 750 mg tablet Take 1 tablet (750 mg total) by mouth every 6 (six) hours, Starting Thu 4/29/2021, Normal      Omega-3 Fatty Acids (fish oil) 1,000 mg Take 1 capsule (1,000 mg total) by mouth 2 (two) times a day, Starting Wed 12/9/2020, Normal      Ticagrelor (BRILINTA PO) Take by mouth, Historical Med           No discharge procedures on file  PDMP Review       Value Time User    PDMP Reviewed  Yes 4/29/2021 10:52 AM Irish Small PA-C           ED Provider  Attending physically available and evaluated [facility] Mode  I managed the patient along with the ED Attending      Electronically Signed by         Ezequiel Workman DO  04/29/21 2057

## 2021-04-29 NOTE — PROGRESS NOTES
Progress Note - General Surgery  : Red Surgery Resident on Obdulia Frankel 45 y o  male MRN: [id number]  Unit/Bed#: [facility] 512-01 Encounter: [id number]    Assessment:  45 y o  male with L thigh mass excision, prolonged hospital stay due to wound complications  Dispo planning       Plan:  Continue vac (placed 4/27)  DM diet  Dispo planning, hopefully discharge today with [facility] for [facility]     Subjective/Objective     Subjective: No complaints       Objective:     Vitals: Temp:  [98 3 °F (36 8 °C)-98 5 °F (36 9 °C)] 98 5 °F (36 9 °C)  HR:  [61-63] 63  Resp:  [16-20] 16  BP: (129-142)/(72-81) 129/72  Body mass index is 55 63 kg/m²  I/O       04/27 0701 - 04/28 0700 04/28 0701 - 04/29 0700    P  O  300 560    Total Intake(mL/kg) 300 (1 9) 560 (3 5)    Urine (mL/kg/hr) 1075 (0 3) 1800 (0 7)    Drains 50 200    Stool 0     Total Output 1125 2000    Net -825 -1440          Unmeasured Urine Occurrence 1 x     Unmeasured Stool Occurrence 1 x           Physical Exam:  GEN: NAD  HEENT: MMM  CV: RRR  Lung: Normal effort  Ab: Soft, ND/NT   Extrem: No CCE   Neuro: A+Ox3   Dressing intact, no strikethrough    Lab, Imaging and other studies: I have personally reviewed pertinent reports    , CBC with diff: No results found for: WBC, HGB, HCT, MCV, PLT, ADJUSTEDWBC, MCH, MCHC, RDW, MPV, NRBC, BMP/CMP: No results found for: SODIUM, K, CL, CO2, ANIONGAP, BUN, CREATININE, GLUCOSE, CALCIUM, AST, ALT, ALKPHOS, PROT, BILITOT, EGFR  VTE Pharmacologic Prophylaxis: Enoxaparin (Lovenox)  VTE Mechanical Prophylaxis: sequential compression device      Matheus Boles MD  4/28/2021 10:02 PM

## 2021-04-29 NOTE — UTILIZATION REVIEW
Notification of Discharge   This is a Notification of Discharge from our facility [address]  Please be advised that this patient has been discharge from our facility  Below you will find the admission and discharge date and time including the patients disposition  UTILIZATION REVIEW CONTACT:  Mateo Sewell  Utilization   Network Utilization Review Department  Phone: [phone] x carefully listen to the prompts  All voicemails are confidential   Email: [email or web address]     PHYSICIAN ADVISORY SERVICES:  FOR [id number] REVIEW - MEDICAL NECESSITY DENIAL  Phone: [phone]  Fax: [phone]  Email: [email or web address]     PRESENTATION DATE: 4/6/2021  5:33 AM  OBERVATION ADMISSION DATE:   INPATIENT ADMISSION DATE: 4/6/21 1348   DISCHARGE DATE: 4/29/2021 12:58 PM  DISPOSITION: Home/Self Care Home/Self Care      IMPORTANT INFORMATION:  Send all requests for admission clinical reviews, approved or denied determinations and any other requests to dedicated fax number below belonging to the campus where the patient is receiving treatment   List of dedicated fax numbers:  [address] DENIALS (Administrative/Medical Necessity) [phone]   [address] [place] [address] (Maternity/NICU/Pediatrics) [phone]   Farzad Seen [phone]   Daija Roy [phone]   Arvind Penny [phone]   [address] [phone]   [facility]  [phone]   [address] [facility] [address] [facility]  24[address] [facility] [address] [facility] [phone]

## 2021-04-30 ENCOUNTER — TRANSITIONAL CARE MANAGEMENT (OUTPATIENT)
Dept: FAMILY MEDICINE CLINIC | Facility: CLINIC | Age: 41
End: 2021-04-30

## 2021-04-30 NOTE — UTILIZATION REVIEW
Notification of Discharge   This is a Notification of Discharge from our facility [address]  Please be advised that this patient has been discharge from our facility  Below you will find the admission and discharge date and time including the patients disposition  UTILIZATION REVIEW CONTACT:  Virginia Bass  Utilization   Network Utilization Review Department  Phone: [phone] x carefully listen to the prompts  All voicemails are confidential   Email: [email or web address]     PHYSICIAN ADVISORY SERVICES:  FOR [id number] REVIEW - MEDICAL NECESSITY DENIAL  Phone: [phone]  Fax: [phone]  Email: [email or web address]     PRESENTATION DATE: 4/6/2021  5:33 AM  OBERVATION ADMISSION DATE:   INPATIENT ADMISSION DATE: 4/6/21 1348   DISCHARGE DATE: 4/29/2021 12:58 PM  DISPOSITION: Home/Self Care Home/Self Care      IMPORTANT INFORMATION:  Send all requests for admission clinical reviews, approved or denied determinations and any other requests to dedicated fax number below belonging to the campus where the patient is receiving treatment   List of dedicated fax numbers:  [address] DENIALS (Administrative/Medical Necessity) [phone]   [address] (Maternity/NICU/Pediatrics) [phone]   Bud Bruce [phone]   Missael Delay [phone]   Lou Cespedes [phone]   Marlen Quezada [address] [phone]   [facility]  [phone]   [address] [facility] [address]  2401 [facility] 1000 [address] [facility] [phone]

## 2021-05-01 NOTE — ED ATTENDING ATTESTATION
4/29/2021  IJasmyne DO, saw and evaluated the patient  I have discussed the patient with the resident/non-physician practitioner and agree with the resident's/non-physician practitioner's findings, Plan of Care, and MDM as documented in the resident's/non-physician practitioner's note, except where noted  All available labs and Radiology studies were reviewed  I was present for key portions of any procedure(s) performed by the resident/non-physician practitioner and I was immediately available to provide assistance  At this point I agree with the current assessment done in the Emergency Department  I have conducted an independent evaluation of this patient a history and physical is as follows:    71-year-old male with recent discharge from the hospital for left groin mass removal with complicated healing and surgical graft presents for bleeding after discharge today from the wound  Mild bleeding    Plan to discuss with surgery and have them see the patient in the ED    ED Course         Critical Care Time  Procedures

## 2021-05-03 ENCOUNTER — OFFICE VISIT (OUTPATIENT)
Dept: SURGERY | Facility: CLINIC | Age: 41
End: 2021-05-03
Payer: COMMERCIAL

## 2021-05-03 VITALS — BODY MASS INDEX: 49.44 KG/M2 | WEIGHT: 315 LBS | HEIGHT: 67 IN | TEMPERATURE: 97.7 F

## 2021-05-03 DIAGNOSIS — R19.09 LEFT GROIN MASS: Primary | ICD-10-CM

## 2021-05-03 PROCEDURE — 1036F TOBACCO NON-USER: CPT | Performed by: SURGERY

## 2021-05-03 PROCEDURE — 3008F BODY MASS INDEX DOCD: CPT | Performed by: SURGERY

## 2021-05-03 PROCEDURE — 99213 OFFICE O/P EST LOW 20 MIN: CPT | Performed by: SURGERY

## 2021-05-03 NOTE — ASSESSMENT & PLAN NOTE
S/P excision of hydradenitis / groin mass    - Continue every other day dressing changes per VNA w/ petroleum jelly gauze, ABD  Packing to left thigh would ideally be done every day - however patient states his family would not be able to "stomach" doing that and VNA only comes every other day  Recommended patient come to ER if any erythema of the area or concern about wound   Otherwise, f/u in 1 week

## 2021-05-03 NOTE — PROGRESS NOTES
Office Visit - General Surgery  Major Li MRN: [id number]  Encounter: [id number]    Assessment and Plan    Problem List Items Addressed This Visit     None          Chief Complaint:  Major Li is a 45 y o  male who presents for Wound Check (wound check)    Subjective      Past Medical History  Past Medical History:   Diagnosis Date    Coronary artery disease     [facility] mellitus [facility] [address] [facility]     Heart disease     CAD   s/p ptca with 1 stent 2012    Hidradenitis suppurativa     groin    Hyperlipidemia     Hypertension     MI (myocardial infarction) ([facility] [address] )     " silent " M I  in the past    Morbid obesity with BMI of 50 0-59 9, adult ([facility] [address] [facility]     Myocardial infarction ([facility] [address] [facility]     Nicotine dependence     Obesity     Pilonidal cyst        Past Surgical History  Past Surgical History:   Procedure Laterality Date    CORONARY ANGIOPLASTY WITH STENT PLACEMENT      INCISION AND DRAINAGE OF WOUND N/A 1/24/2017    Procedure: INCISION AND DRAINAGE (I&D) BUTTOCK, PILONIDAL CYST;  Surgeon: Carlos Mora MD;  Location: [address] [place] [address];  Service:    [facility] LEG SURGERY Left     I&D of left leg 2012    [place] EXC SKIN BENIG >4 CM TRUNK,ARM,LEG Left 4/6/2021    Procedure: EXCISION THIGH MASS;  Surgeon: Chad Monroy MD;  [facility];  Service: General    [place] NEG PRESS WOUND TX, < 50 CM Left 4/6/2021    Procedure: APPLICATION VAC DRESSING THIGH;  Surgeon: Chad Monroy MD;  [facility];  Service: General    WOUND DEBRIDEMENT Left 4/17/2021    Procedure: DEBRIDEMENT WOUND AND DRESSING CHANGE ([address] OUT);   Surgeon: Umair Castaneda DO;  [facility];  Service: General    WOUND DEBRIDEMENT Left 4/22/2021    Procedure: DEBRIDEMENT LOWER EXTREMITY [facility] OUT), left groin and thigh;  Surgeon: Chad Monroy MD;  [facility];  Service: General       Family History  Family History   Problem Relation Age of Onset    Heart disease Father        Social History  Social History     Socioeconomic History    Marital status: Single     Spouse name: None    Number of children: None    Years of education: None    Highest education level: None   Occupational History    None   Social Needs    Financial resource strain: None    Food insecurity     Worry: None     Inability: None    Transportation needs     Medical: None     Non-medical: None   Tobacco Use    Smoking status: Former Smoker     Packs/day: 1 00     Years: 20 00     Pack years: 20 00     Types: Cigarettes    Smokeless tobacco: Never Used   Substance and Sexual Activity    Alcohol use: Not Currently     Comment: rarely    Drug use: No    Sexual activity: None   Lifestyle    Physical activity     Days per week: None     Minutes per session: None    Stress: None   Relationships    Social connections     Talks on phone: None     Gets together: None     Attends Buddhist service: None     Active member of club or organization: None     Attends meetings of clubs or organizations: None     Relationship status: None    Intimate partner violence     Fear of current or ex partner: None     Emotionally abused: None     Physically abused: None     Forced sexual activity: None   Other Topics Concern    None   Social History Narrative    None        Medications  Current Outpatient Medications on File Prior to Visit   Medication Sig Dispense Refill    acetaminophen (TYLENOL) 325 mg tablet Take 3 tablets (975 mg total) by mouth every 8 (eight) hours  0    amLODIPine (NORVASC) 10 mg tablet take 1 tablet by mouth once daily 90 tablet 0    aspirin (ECOTRIN LOW STRENGTH) 81 mg EC tablet Take 1 tablet (81 mg total) by mouth daily 30 tablet 0    atorvastatin (LIPITOR) 80 mg tablet Take 1 tablet (80 mg total) by mouth daily 90 tablet 2    carvedilol (COREG) 6 25 mg tablet Take 1 tablet (6 25 mg total) by mouth 2 (two) times a day with meals 120 tablet 2    doxycycline (DORYX) 100 MG EC tablet Take 1 tablet (100 mg total) by mouth 2 (two) times a day for 5 days 10 tablet 0    fluconazole (DIFLUCAN) 200 mg tablet Take 1 tablet (200 mg total) by mouth daily for 6 days 6 tablet 0    gabapentin (NEURONTIN) 300 mg capsule Take 1 capsule (300 mg total) by mouth 3 (three) times a day for 14 days 42 capsule 0    glimepiride (AMARYL) 4 mg tablet Take 1 tablet (4 mg total) by mouth daily with breakfast 90 tablet 1    HYDROmorphone (DILAUDID) 4 mg tablet Take 1 tablet (4 mg total) by mouth every 4 (four) hours as needed for severe pain for up to 10 daysMax Daily Amount: 24 mg 30 tablet 0    lisinopril (ZESTRIL) 10 mg tablet Take 1 tablet (10 mg total) by mouth daily 90 tablet 1    metFORMIN (GLUCOPHAGE) 1000 MG tablet Take 1 tablet (1,000 mg total) by mouth 2 (two) times a day with meals 180 tablet 1    methocarbamol (ROBAXIN) 750 mg tablet Take 1 tablet (750 mg total) by mouth every 6 (six) hours 30 tablet 0    Omega-3 Fatty Acids (fish oil) 1,000 mg Take 1 capsule (1,000 mg total) by mouth 2 (two) times a day 180 capsule 2    Ticagrelor (BRILINTA PO) Take by mouth       No current facility-administered medications on file prior to visit          Allergies  Allergies   Allergen Reactions    Amoxicillin Hives       Review of Systems    Objective  Vitals:    05/03/21 1128   Temp: 97 7 °F (36 5 °C)       Physical Exam

## 2021-05-03 NOTE — PROGRESS NOTES
Office Visit - General Surgery  Anthony Payne MRN: [id number]  Encounter: [id number]    Assessment and Plan    Problem List Items Addressed This Visit        Other    Left groin mass - Primary     S/P excision of hydradenitis / groin mass    - Continue every other day dressing changes per VNA w/ petroleum jelly gauze, ABD  Packing to left thigh would ideally be done every day - however patient states his family would not be able to "stomach" doing that and VNA only comes every other day  Recommended patient come to ER if any erythema of the area or concern about wound  Otherwise, f/u in 1 week               Chief Complaint:  Anthony Payne is a 45 y o  male who presents for wound check s/p recent admission for hydradenitis excision on 4/6/21     Wound Check (wound check)    Subjective  No complaints   Dressings being changed every other day    Past Medical History  Past Medical History:   Diagnosis Date    Coronary artery disease     [facility]     Heart disease     CAD   s/p ptca with 1 stent 2012    Hidradenitis suppurativa     groin    Hyperlipidemia     Hypertension     [facility] infarction) [facility]     " silent " M I  in the past    Morbid obesity with BMI of 50 0-59 9, adult ([facility] [facility] )     Myocardial infarction ([facility]     Nicotine dependence     Obesity     Pilonidal cyst        Past Surgical History  Past Surgical History:   Procedure Laterality Date    CORONARY ANGIOPLASTY WITH STENT PLACEMENT      INCISION AND DRAINAGE OF WOUND N/A 1/24/2017    Procedure: INCISION AND DRAINAGE (I&D) BUTTOCK, PILONIDAL CYST;  Surgeon: Sherley Mansfield MD;  Location: [address] [place] [address];  Service:    [facility] LEG SURGERY Left     I&D of left leg 2012    AL EXC SKIN BENIG >4 CM TRUNK,ARM,LEG Left 4/6/2021    Procedure: EXCISION THIGH MASS;  Surgeon: Ariela Jones MD;  [facility];  Service: General    AL NEG PRESS WOUND [address], < 50 CM Left 4/6/2021    Procedure: APPLICATION VAC DRESSING THIGH;  Surgeon: Huy Rey Mg Siu MD;  [facility];  Service: General    WOUND DEBRIDEMENT Left 4/17/2021    Procedure: DEBRIDEMENT WOUND AND DRESSING CHANGE [facility] OUT);   Surgeon: Kush Lebron DO;  [facility];  Service: General    WOUND DEBRIDEMENT Left 4/22/2021    Procedure: DEBRIDEMENT LOWER EXTREMITY [facility] OUT), left groin and thigh;  Surgeon: Elena Jacinto MD;  [facility];  Service: General       Family History  Family History   Problem Relation Age of Onset    Heart disease Father        Social History  Social History     Socioeconomic History    Marital status: Single     Spouse name: None    Number of children: None    Years of education: None    Highest education level: None   Occupational History    None   Social Needs    Financial resource strain: None    Food insecurity     Worry: None     Inability: None    Transportation needs     Medical: None     Non-medical: None   Tobacco Use    Smoking status: Former Smoker     Packs/day: 1 00     Years: 20 00     Pack years: 20 00     Types: Cigarettes    Smokeless tobacco: Never Used   Substance and Sexual Activity    Alcohol use: Not Currently     Comment: rarely    Drug use: No    Sexual activity: None   Lifestyle    Physical activity     Days per week: None     Minutes per session: None    Stress: None   Relationships    Social connections     Talks on phone: None     Gets together: None     Attends Ep[employer]opalian service: None     Active member of club or organization: None     Attends meetings of clubs or organizations: None     Relationship status: None    Intimate partner violence     Fear of current or ex partner: None     Emotionally abused: None     Physically abused: None     Forced sexual activity: None   Other Topics Concern    None   Social History Narrative    None        Medications  Current Outpatient Medications on File Prior to Visit   Medication Sig Dispense Refill    acetaminophen (TYLENOL) 325 mg tablet Take 3 tablets (975 mg total) by mouth every 8 (eight) hours  0    amLODIPine (NORVASC) 10 mg tablet take 1 tablet by mouth once daily 90 tablet 0    aspirin (ECOTRIN LOW STRENGTH) 81 mg EC tablet Take 1 tablet (81 mg total) by mouth daily 30 tablet 0    atorvastatin (LIPITOR) 80 mg tablet Take 1 tablet (80 mg total) by mouth daily 90 tablet 2    carvedilol (COREG) 6 25 mg tablet Take 1 tablet (6 25 mg total) by mouth 2 (two) times a day with meals 120 tablet 2    doxycycline (DORYX) 100 MG EC tablet Take 1 tablet (100 mg total) by mouth 2 (two) times a day for 5 days 10 tablet 0    fluconazole (DIFLUCAN) 200 mg tablet Take 1 tablet (200 mg total) by mouth daily for 6 days 6 tablet 0    gabapentin (NEURONTIN) 300 mg capsule Take 1 capsule (300 mg total) by mouth 3 (three) times a day for 14 days 42 capsule 0    glimepiride (AMARYL) 4 mg tablet Take 1 tablet (4 mg total) by mouth daily with breakfast 90 tablet 1    HYDROmorphone (DILAUDID) 4 mg tablet Take 1 tablet (4 mg total) by mouth every 4 (four) hours as needed for severe pain for up to 10 daysMax Daily Amount: 24 mg 30 tablet 0    lisinopril (ZESTRIL) 10 mg tablet Take 1 tablet (10 mg total) by mouth daily 90 tablet 1    metFORMIN (GLUCOPHAGE) 1000 MG tablet Take 1 tablet (1,000 mg total) by mouth 2 (two) times a day with meals 180 tablet 1    methocarbamol (ROBAXIN) 750 mg tablet Take 1 tablet (750 mg total) by mouth every 6 (six) hours 30 tablet 0    Omega-3 Fatty Acids (fish oil) 1,000 mg Take 1 capsule (1,000 mg total) by mouth 2 (two) times a day 180 capsule 2    Ticagrelor (BRILINTA PO) Take by mouth       No current facility-administered medications on file prior to visit  Allergies  Allergies   Allergen Reactions    Amoxicillin Hives       Review of Systems   Constitutional: Negative  HENT: Negative  Eyes: Negative  Respiratory: Negative  Cardiovascular: Negative  Gastrointestinal: Negative  Genitourinary: Negative  Musculoskeletal: Negative  Skin: Positive for wound  Allergic/Immunologic: Negative  Neurological: Negative  Hematological: Negative  Psychiatric/Behavioral: Negative  Objective  Vitals:    05/03/21 1128   Temp: 97 7 °F (36 5 °C)       Physical Exam  Constitutional:       Appearance: Normal appearance  He is obese  HENT:      Head: Normocephalic  Nose: Nose normal       Mouth/Throat:      Mouth: Mucous membranes are moist    Eyes:      Pupils: Pupils are equal, round, and reactive to light  Neck:      Musculoskeletal: Normal range of motion  Cardiovascular:      Rate and Rhythm: Normal rate and regular rhythm  Pulmonary:      Effort: Pulmonary effort is normal       Breath sounds: Normal breath sounds  Abdominal:      General: There is no distension  Palpations: Abdomen is soft  Tenderness: There is no abdominal tenderness  Skin:     General: Skin is warm  Capillary Refill: Capillary refill takes less than 2 seconds  Comments: L inner thigh wound with healthy pink granulation  Sinus on L thigh with packing - moderate amount of thin purulent drainage expressed w/ removal of packing  Neurological:      General: No focal deficit present  Mental Status: He is alert and oriented to person, place, and time  Mental status is at baseline  Psychiatric:         Mood and Affect: Mood normal          Behavior: Behavior normal          Thought Content:  Thought content normal

## 2021-05-05 ENCOUNTER — TELEPHONE (OUTPATIENT)
Dept: SURGERY | Facility: CLINIC | Age: 41
End: 2021-05-05

## 2021-05-05 NOTE — TELEPHONE ENCOUNTER
JOSEA called and requested a copy of the office note from 5-3-21 for the wound care to patients thigh wound  Faxed office visit note to [phone] 
PCP -Dr. Galan

## 2021-05-06 ENCOUNTER — TELEPHONE (OUTPATIENT)
Dept: BARIATRICS | Facility: CLINIC | Age: 41
End: 2021-05-06

## 2021-05-06 NOTE — TELEPHONE ENCOUNTER
Spoke to pt about missed weight check on 5/3/21  Pt had surgery to remove a large mass on his groin/thigh area, was in the hospital for about 25 days and wants to heal up appropriately before proceeds with bariatric surgery therefore would like to halt his process that this time  Explained to pt about the halting his process advising him that he will have to redo the 3hr eval when he does decide to re-enter the program   Pt verbalizes understanding

## 2021-05-10 ENCOUNTER — OFFICE VISIT (OUTPATIENT)
Dept: SURGERY | Facility: CLINIC | Age: 41
End: 2021-05-10
Payer: COMMERCIAL

## 2021-05-10 VITALS — TEMPERATURE: 97.4 F | HEIGHT: 67 IN | WEIGHT: 315 LBS | BODY MASS INDEX: 49.44 KG/M2

## 2021-05-10 DIAGNOSIS — L73.2 HIDRADENITIS SUPPURATIVA: Primary | ICD-10-CM

## 2021-05-10 PROCEDURE — 99213 OFFICE O/P EST LOW 20 MIN: CPT | Performed by: SURGERY

## 2021-05-10 NOTE — PROGRESS NOTES
Office Visit - General Surgery  Jacob Harris MRN: [id number]  Encounter: [id number]    Assessment and Plan    Problem List Items Addressed This Visit     None        44 yo M s/p excision of hydradenitis  Continue every other day dressing changes per VNA with petroleum jelly gauze and ABD  He stated he might able to change the 6 days a week  Follow up in 1 week    Chief Complaint:  Jacob Hraris is a 45 y o  male who presents for Wound Check (wound check)  s/p recent admission for hydradenitis excision on 4/6/21  Subjective  No complaints  Dressing being changed every other day    Past Medical History  Past Medical History:   Diagnosis Date    Coronary artery disease     Diabetes mellitus [facility] [facility]     Heart disease     CAD   s/p ptca with 1 stent 2012    Hidradenitis suppurativa     groin    Hyperlipidemia     Hypertension     MI (myocardial infarction) ([facility] )     " silent " M I  in the past    Morbid obesity with BMI of 50 0-59 9, adult ([facility] [address] )     Myocardial infarction ([facility] [address] [facility]     Nicotine dependence     Obesity     Pilonidal cyst        Past Surgical History  Past Surgical History:   Procedure Laterality Date    CORONARY ANGIOPLASTY WITH STENT PLACEMENT      INCISION AND DRAINAGE OF WOUND N/A 1/24/2017    Procedure: INCISION AND DRAINAGE (I&D) BUTTOCK, PILONIDAL CYST;  Surgeon: Meggan Brock MD;  Location: [address] [place] [address];  Service:    [facility] LEG SURGERY Left     I&D of left leg 2012    DE EXC SKIN BENIG >4 CM TRUNK,ARM,LEG Left 4/6/2021    Procedure: EXCISION THIGH MASS;  Surgeon: Prema Alcazar MD;  [facility];  Service: General    DE NEG PRESS WOUND [place], < 50 CM Left 4/6/2021    Procedure: APPLICATION VAC DRESSING THIGH;  Surgeon: Prema Alcazar MD;  [facility];  Service: General    WOUND DEBRIDEMENT Left 4/17/2021    Procedure: DEBRIDEMENT WOUND AND DRESSING CHANGE ([facility]);   Surgeon: Racquel Palmer DO;  [facility];  Service: General    WOUND DEBRIDEMENT Left 4/22/2021 Procedure: DEBRIDEMENT LOWER EXTREMITY [facility] OUT), left groin and thigh;  Surgeon: Jonathan Edwards MD;  [facility];  Service: General       Family History  Family History   Problem Relation Age of Onset    Heart disease Father        Social History  Social History     Socioeconomic History    Marital status: Single     Spouse name: None    Number of children: None    Years of education: None    Highest education level: None   Occupational History    None   Social Needs    Financial resource strain: None    Food insecurity     Worry: None     Inability: None    Transportation needs     Medical: None     Non-medical: None   Tobacco Use    Smoking status: Former Smoker     Packs/day: 1 00     Years: 20 00     Pack years: 20 00     Types: Cigarettes    Smokeless tobacco: Never Used   Substance and Sexual Activity    Alcohol use: Not Currently     Comment: rarely    Drug use: No    Sexual activity: None   Lifestyle    Physical activity     Days per week: None     Minutes per session: None    Stress: None   Relationships    Social connections     Talks on phone: None     Gets together: None     Attends Rastafari service: None     Active member of club or organization: None     Attends meetings of clubs or organizations: None     Relationship status: None    Intimate partner violence     Fear of current or ex partner: None     Emotionally abused: None     Physically abused: None     Forced sexual activity: None   Other Topics Concern    None   Social History Narrative    None        Medications  Current Outpatient Medications on File Prior to Visit   Medication Sig Dispense Refill    acetaminophen (TYLENOL) 325 mg tablet Take 3 tablets (975 mg total) by mouth every 8 (eight) hours  0    amLODIPine (NORVASC) 10 mg tablet take 1 tablet by mouth once daily 90 tablet 0    aspirin (ECOTRIN LOW STRENGTH) 81 mg EC tablet Take 1 tablet (81 mg total) by mouth daily 30 tablet 0    atorvastatin (LIPITOR) 80 mg tablet Take 1 tablet (80 mg total) by mouth daily 90 tablet 2    carvedilol (COREG) 6 25 mg tablet Take 1 tablet (6 25 mg total) by mouth 2 (two) times a day with meals 120 tablet 2    glimepiride (AMARYL) 4 mg tablet Take 1 tablet (4 mg total) by mouth daily with breakfast 90 tablet 1    lisinopril (ZESTRIL) 10 mg tablet Take 1 tablet (10 mg total) by mouth daily 90 tablet 1    metFORMIN (GLUCOPHAGE) 1000 MG tablet Take 1 tablet (1,000 mg total) by mouth 2 (two) times a day with meals 180 tablet 1    Omega-3 Fatty Acids (fish oil) 1,000 mg Take 1 capsule (1,000 mg total) by mouth 2 (two) times a day 180 capsule 2    Ticagrelor (BRILINTA PO) Take by mouth      gabapentin (NEURONTIN) 300 mg capsule Take 1 capsule (300 mg total) by mouth 3 (three) times a day for 14 days (Patient not taking: Reported on 5/10/2021) 42 capsule 0    [] HYDROmorphone (DILAUDID) 4 mg tablet Take 1 tablet (4 mg total) by mouth every 4 (four) hours as needed for severe pain for up to 10 daysMax Daily Amount: 24 mg 30 tablet 0    methocarbamol (ROBAXIN) 750 mg tablet Take 1 tablet (750 mg total) by mouth every 6 (six) hours (Patient not taking: Reported on 5/10/2021) 30 tablet 0     No current facility-administered medications on file prior to visit  Allergies  Allergies   Allergen Reactions    Amoxicillin Hives       Review of Systems   Constitutional: Negative  HENT: Negative  Eyes: Negative  Respiratory: Negative  Cardiovascular: Negative  Gastrointestinal: Negative  Musculoskeletal: Negative  Skin: Positive for wound  Hematological: Negative  Psychiatric/Behavioral: Negative  All other systems reviewed and are negative  Objective  Vitals:    05/10/21 1125   Temp: (!) 97 4 °F (36 3 °C)       Physical Exam  Vitals signs and nursing note reviewed  Constitutional:       Appearance: He is normal weight  HENT:      Head: Normocephalic and atraumatic        Right Ear: External ear normal       Left Ear: External ear normal       Nose: Nose normal       Mouth/Throat:      Mouth: Mucous membranes are moist       Pharynx: Oropharynx is clear  Eyes:      Extraocular Movements: Extraocular movements intact  Conjunctiva/sclera: Conjunctivae normal       Pupils: Pupils are equal, round, and reactive to light  Cardiovascular:      Rate and Rhythm: Normal rate and regular rhythm  Pulses: Normal pulses  Heart sounds: Normal heart sounds  Pulmonary:      Effort: Pulmonary effort is normal       Breath sounds: Normal breath sounds  Abdominal:      General: Abdomen is flat  Bowel sounds are normal    Musculoskeletal: Normal range of motion  Skin:     General: Skin is warm  Capillary Refill: Capillary refill takes less than 2 seconds  Comments: Left inner thigh wound: healthy granulation tissue, greenish discharge on dressing     Neurological:      General: No focal deficit present  Mental Status: He is alert and oriented to person, place, and time  Mental status is at baseline  Psychiatric:         Mood and Affect: Mood normal          Behavior: Behavior normal          Thought Content:  Thought content normal          Judgment: Judgment normal

## 2021-05-12 DIAGNOSIS — I10 ESSENTIAL HYPERTENSION: ICD-10-CM

## 2021-05-13 RX ORDER — AMLODIPINE BESYLATE 10 MG/1
10 TABLET ORAL DAILY
Qty: 90 TABLET | Refills: 0 | Status: SHIPPED | OUTPATIENT
Start: 2021-05-13 | End: 2021-09-07

## 2021-05-17 ENCOUNTER — OFFICE VISIT (OUTPATIENT)
Dept: SURGERY | Facility: CLINIC | Age: 41
End: 2021-05-17

## 2021-05-17 VITALS — TEMPERATURE: 97.3 F | BODY MASS INDEX: 49.44 KG/M2 | HEIGHT: 67 IN | WEIGHT: 315 LBS

## 2021-05-17 DIAGNOSIS — R19.09 LEFT GROIN MASS: Primary | ICD-10-CM

## 2021-05-17 PROCEDURE — 3008F BODY MASS INDEX DOCD: CPT | Performed by: SURGERY

## 2021-05-17 PROCEDURE — 99024 POSTOP FOLLOW-UP VISIT: CPT | Performed by: SURGERY

## 2021-05-17 NOTE — PROGRESS NOTES
Office Visit - General Surgery  Sherlyn Cardoza MRN: [id number]  Encounter: [id number]    Assessment and Plan    Problem List Items Addressed This Visit        Other    Left groin mass - Primary     S/p excision of left groin mass and application of phoenix dermal matrix    Continue local wound care and follow up in 1 week for STSG planning vs re-application of phoenix  Would discontinue adaptic and transition to calcium alginate, ABD and ACE wrap given amount of drainage  Remaining lateral sutures removed, incision well approximated at this portion               Chief Complaint:  Sherlyn Cardoza is a 45 y o  male who presents for Wound Check (Wound check left groin mass )    Subjective  Doing very well with local wound care  Performing daily  Not requiring analgesia with dressing changes which I am glad to hear as he required a fair amount while inpatient when I last saw him  Denies signs/sxs of infection such as fever, erythema or foul smelling discharge  Still on disability/not returned to work 2/2 complex wound care and large open wound - I think this is appropriate  We discussed proceeding with STSG to expedite his healing and return to work  Will make that decision at his next follow up appointment upon reassessing wound healing and drainage  He is agreeable to this plan      Past Medical History  Past Medical History:   Diagnosis Date    Coronary artery disease     Diabetes mellitus ([address] )     Heart disease     CAD   s/p ptca with 1 stent 2012    Hidradenitis suppurativa     groin    Hyperlipidemia     Hypertension     MI (myocardial infarction) ([address] )     " silent " M I  in the past    Morbid obesity with BMI of 50 0-59 9, adult ([address] )     Myocardial infarction ([address] )     Nicotine dependence     Obesity     Pilonidal cyst        Past Surgical History  Past Surgical History:   Procedure Laterality Date    CORONARY ANGIOPLASTY WITH STENT PLACEMENT      INCISION AND DRAINAGE OF WOUND N/A 1/24/2017    Procedure: INCISION AND DRAINAGE (I&D) BUTTOCK, PILONIDAL CYST;  Surgeon: Onel Feliciano MD;  Location: [address];  Service:    [facility] LEG SURGERY Left     I&D of left leg 2012    DC EXC SKIN BENIG >4 CM TRUNK,ARM,LEG Left 4/6/2021    Procedure: EXCISION THIGH MASS;  Surgeon: Oliver Hutton MD;  [facility];  Service: General    DC NEG PRESS WOUND [place], < 50 CM Left 4/6/2021    Procedure: APPLICATION VAC DRESSING THIGH;  Surgeon: Oliver Hutton MD;  [facility];  Service: General    WOUND DEBRIDEMENT Left 4/17/2021    Procedure: DEBRIDEMENT WOUND AND DRESSING CHANGE ([address] OUT);   Surgeon: Cary Harvey DO;  [facility];  Service: General    WOUND DEBRIDEMENT Left 4/22/2021    Procedure: DEBRIDEMENT LOWER EXTREMITY [facility] OUT), left groin and thigh;  Surgeon: Oliver Hutton MD;  [facility];  Service: General       Family History  Family History   Problem Relation Age of Onset    Heart disease Father        Social History  Social History     Socioeconomic History    Marital status: Single     Spouse name: None    Number of children: None    Years of education: None    Highest education level: None   Occupational History    None   Social Needs    Financial resource strain: None    Food insecurity     Worry: None     Inability: None    Transportation needs     Medical: None     Non-medical: None   Tobacco Use    Smoking status: Former Smoker     Packs/day: 1 00     Years: 20 00     Pack years: 20 00     Types: Cigarettes    Smokeless tobacco: Never Used   Substance and Sexual Activity    Alcohol use: Not Currently     Comment: rarely    Drug use: No    Sexual activity: None   Lifestyle    Physical activity     Days per week: None     Minutes per session: None    Stress: None   Relationships    Social connections     Talks on phone: None     Gets together: None     Attends Taoism service: None     Active member of club or organization: None     Attends meetings of clubs or organizations: None     Relationship status: None    Intimate partner violence     Fear of current or ex partner: None     Emotionally abused: None     Physically abused: None     Forced sexual activity: None   Other Topics Concern    None   Social History Narrative    None        Medications  Current Outpatient Medications on File Prior to Visit   Medication Sig Dispense Refill    acetaminophen (TYLENOL) 325 mg tablet Take 3 tablets (975 mg total) by mouth every 8 (eight) hours  0    amLODIPine (NORVASC) 10 mg tablet Take 1 tablet (10 mg total) by mouth daily 90 tablet 0    aspirin (ECOTRIN LOW STRENGTH) 81 mg EC tablet Take 1 tablet (81 mg total) by mouth daily 30 tablet 0    atorvastatin (LIPITOR) 80 mg tablet Take 1 tablet (80 mg total) by mouth daily 90 tablet 2    carvedilol (COREG) 6 25 mg tablet Take 1 tablet (6 25 mg total) by mouth 2 (two) times a day with meals 120 tablet 2    glimepiride (AMARYL) 4 mg tablet Take 1 tablet (4 mg total) by mouth daily with breakfast 90 tablet 1    lisinopril (ZESTRIL) 10 mg tablet Take 1 tablet (10 mg total) by mouth daily 90 tablet 1    metFORMIN (GLUCOPHAGE) 1000 MG tablet Take 1 tablet (1,000 mg total) by mouth 2 (two) times a day with meals 180 tablet 1    Omega-3 Fatty Acids (fish oil) 1,000 mg Take 1 capsule (1,000 mg total) by mouth 2 (two) times a day 180 capsule 2    Ticagrelor (BRILINTA PO) Take by mouth      gabapentin (NEURONTIN) 300 mg capsule Take 1 capsule (300 mg total) by mouth 3 (three) times a day for 14 days (Patient not taking: Reported on 5/10/2021) 42 capsule 0    methocarbamol (ROBAXIN) 750 mg tablet Take 1 tablet (750 mg total) by mouth every 6 (six) hours (Patient not taking: Reported on 5/10/2021) 30 tablet 0     No current facility-administered medications on file prior to visit  Allergies  Allergies   Allergen Reactions    Amoxicillin Hives       Review of Systems   Constitutional: Negative for chills and fever  HENT: Negative  Eyes: Negative  Respiratory: Negative for shortness of breath  Cardiovascular: Negative for chest pain  Gastrointestinal: Negative  Negative for abdominal pain, nausea and vomiting  Endocrine: Negative  Genitourinary: Negative  Musculoskeletal: Negative  Skin: Positive for wound  Negative for color change  Allergic/Immunologic: Negative  Neurological: Negative  Hematological: Negative  Psychiatric/Behavioral: Negative  Objective  Vitals:    05/17/21 1146   Temp: (!) 97 3 °F (36 3 °C)       Physical Exam   GEN: NAD  HEENT: MMM  CV: warm/well perfused  Lung: normal effort  Ab: Soft, NT/ND  Extrem: Large left groin wound with pink, healthy granulation tissue  No bleeding  Scant exudate on wound, fair amount of serous drainage on ABD pad  No surrounding erythema, tenderness  Neuro:  A+Ox3, motor and sensation grossly intact

## 2021-05-17 NOTE — ASSESSMENT & PLAN NOTE
S/p excision of left groin mass and application of phoenix dermal matrix    Continue local wound care and follow up in 1 week for STSG planning vs re-application of phoenix  Would discontinue adaptic and transition to calcium alginate, ABD and ACE wrap given amount of drainage      Remaining lateral sutures removed, incision well approximated at this portion

## 2021-05-24 ENCOUNTER — OFFICE VISIT (OUTPATIENT)
Dept: SURGERY | Facility: CLINIC | Age: 41
End: 2021-05-24
Payer: COMMERCIAL

## 2021-05-24 ENCOUNTER — HOSPITAL ENCOUNTER (EMERGENCY)
Facility: HOSPITAL | Age: 41
Discharge: HOME | End: 2021-05-24
Attending: EMERGENCY MEDICINE
Payer: COMMERCIAL

## 2021-05-24 VITALS
WEIGHT: 315 LBS | TEMPERATURE: 100.8 F | SYSTOLIC BLOOD PRESSURE: 130 MMHG | DIASTOLIC BLOOD PRESSURE: 80 MMHG | BODY MASS INDEX: 54.35 KG/M2 | HEART RATE: 102 BPM

## 2021-05-24 VITALS
SYSTOLIC BLOOD PRESSURE: 139 MMHG | TEMPERATURE: 99.7 F | HEART RATE: 101 BPM | OXYGEN SATURATION: 99 % | RESPIRATION RATE: 20 BRPM | DIASTOLIC BLOOD PRESSURE: 82 MMHG

## 2021-05-24 DIAGNOSIS — L73.2 HYDRADENITIS: Primary | ICD-10-CM

## 2021-05-24 DIAGNOSIS — R50.9 FEVER: ICD-10-CM

## 2021-05-24 DIAGNOSIS — R50.9 LOW GRADE FEVER: Primary | ICD-10-CM

## 2021-05-24 DIAGNOSIS — Z20.822 ENCOUNTER FOR LABORATORY TESTING FOR COVID-19 VIRUS: ICD-10-CM

## 2021-05-24 LAB — SARS-COV-2 RNA RESP QL NAA+PROBE: NEGATIVE

## 2021-05-24 PROCEDURE — U0003 INFECTIOUS AGENT DETECTION BY NUCLEIC ACID (DNA OR RNA); SEVERE ACUTE RESPIRATORY SYNDROME CORONAVIRUS 2 (SARS-COV-2) (CORONAVIRUS DISEASE [COVID-19]), AMPLIFIED PROBE TECHNIQUE, MAKING USE OF HIGH THROUGHPUT TECHNOLOGIES AS DESCRIBED BY CMS-2020-01-R: HCPCS | Performed by: EMERGENCY MEDICINE

## 2021-05-24 PROCEDURE — 3079F DIAST BP 80-89 MM HG: CPT | Performed by: SURGERY

## 2021-05-24 PROCEDURE — 99214 OFFICE O/P EST MOD 30 MIN: CPT | Performed by: SURGERY

## 2021-05-24 PROCEDURE — 99283 EMERGENCY DEPT VISIT LOW MDM: CPT

## 2021-05-24 PROCEDURE — U0005 INFEC AGEN DETEC AMPLI PROBE: HCPCS | Performed by: EMERGENCY MEDICINE

## 2021-05-24 PROCEDURE — 1036F TOBACCO NON-USER: CPT | Performed by: SURGERY

## 2021-05-24 PROCEDURE — 3075F SYST BP GE 130 - 139MM HG: CPT | Performed by: SURGERY

## 2021-05-24 PROCEDURE — 99281 EMR DPT VST MAYX REQ PHY/QHP: CPT | Performed by: EMERGENCY MEDICINE

## 2021-05-24 NOTE — ED PROVIDER NOTES
History  Chief Complaint   Patient presents with    Medical Problem     States he went to his Dr and was told he had a temp of 100  Here for COVID test only  History provided by:  Patient  Fever - 9 weeks to 74 years  Max temp prior to arrival:  100  Temp source:  Oral  Severity:  Moderate  Onset quality:  Unable to specify  Duration: unclear  Timing:  Constant  Progression:  Unchanged  Chronicity:  New  Relieved by:  None tried  Worsened by:  Nothing  Ineffective treatments:  None tried  Associated symptoms: no chest pain, no chills, no confusion, no congestion, no cough, no diarrhea, no dysuria, no ear pain, no headaches, no nausea, no rash, no sore throat and no vomiting    Associated symptoms comment:  Patient is completely asymptomatic  Patient says he had a follow-up appointment his surgeon's office due to a leg surgery, they checked his temperature and it was 100 to the advised to come to the emergency department  Patient says that he was told to be tested for COVID he says he otherwise feels perfectly fine does not want to be tested for anything else  Prior to Admission Medications   Prescriptions Last Dose Informant Patient Reported? Taking?    Omega-3 Fatty Acids (fish oil) 1,000 mg   No No   Sig: Take 1 capsule (1,000 mg total) by mouth 2 (two) times a day   Ticagrelor (BRILINTA PO)   Yes No   Sig: Take by mouth   acetaminophen (TYLENOL) 325 mg tablet   No No   Sig: Take 3 tablets (975 mg total) by mouth every 8 (eight) hours   amLODIPine (NORVASC) 10 mg tablet   No No   Sig: Take 1 tablet (10 mg total) by mouth daily   aspirin (ECOTRIN LOW STRENGTH) 81 mg EC tablet   No No   Sig: Take 1 tablet (81 mg total) by mouth daily   atorvastatin (LIPITOR) 80 mg tablet   No No   Sig: Take 1 tablet (80 mg total) by mouth daily   carvedilol (COREG) 6 25 mg tablet   No No   Sig: Take 1 tablet (6 25 mg total) by mouth 2 (two) times a day with meals   gabapentin (NEURONTIN) 300 mg capsule   No No   Sig: Take 1 capsule (300 mg total) by mouth 3 (three) times a day for 14 days   Patient not taking: Reported on 5/10/2021   glimepiride (AMARYL) 4 mg tablet   No No   Sig: Take 1 tablet (4 mg total) by mouth daily with breakfast   lisinopril (ZESTRIL) 10 mg tablet   No No   Sig: Take 1 tablet (10 mg total) by mouth daily   metFORMIN (GLUCOPHAGE) 1000 MG tablet   No No   Sig: Take 1 tablet (1,000 mg total) by mouth 2 (two) times a day with meals   methocarbamol (ROBAXIN) 750 mg tablet   No No   Sig: Take 1 tablet (750 mg total) by mouth every 6 (six) hours   Patient not taking: Reported on 5/10/2021      Facility-Administered Medications: None       Past Medical History:   Diagnosis Date    Coronary artery disease     [facility]     Heart disease     CAD   s/p ptca with 1 stent 2012    Hidradenitis suppurativa     groin    Hyperlipidemia     Hypertension     [facility]     " silent " M I  in the past    Morbid obesity with BMI of 50 0-59 9, adult ([facility]     [facility] infarction [facility]     Nicotine dependence     Obesity     Pilonidal cyst        Past Surgical History:   Procedure Laterality Date    CORONARY ANGIOPLASTY WITH STENT PLACEMENT      INCISION AND DRAINAGE OF WOUND N/A 1/24/2017    Procedure: INCISION AND DRAINAGE (I&D) BUTTOCK, PILONIDAL CYST;  Surgeon: Onel Feliciano MD;  Location: [address] [place] [address];  Service:    [facility] LEG SURGERY Left     I&D of left leg 2012    IA EXC SKIN BENIG >4 CM TRUNK,ARM,LEG Left 4/6/2021    Procedure: EXCISION THIGH MASS;  Surgeon: Oliver Hutton MD;  [facility];  Service: General    IA NEG PRESS WOUND [address], < 50 CM Left 4/6/2021    Procedure: APPLICATION VAC DRESSING THIGH;  Surgeon: Oliver Hutton MD;  [facility];  Service: General    WOUND DEBRIDEMENT Left 4/17/2021    Procedure: DEBRIDEMENT WOUND AND DRESSING CHANGE ([address] OUT);   Surgeon: Cary Harvey DO;  [facility];  Service: General    WOUND DEBRIDEMENT Left 4/22/2021 Procedure: DEBRIDEMENT LOWER EXTREMITY [facility] OUT), left groin and thigh;  Surgeon: Angelique Holloway MD;  [facility];  Service: General       Family History   Problem Relation Age of Onset    Heart disease Father      I have reviewed and agree with the history as documented  E-Cigarette/Vaping    E-Cigarette Use Never User      E-Cigarette/Vaping Substances    Nicotine No     THC No     CBD No     Flavoring No      Social History     Tobacco Use    Smoking status: Former Smoker     Packs/day: 1 00     Years: 20 00     Pack years: 20 00     Types: Cigarettes    Smokeless tobacco: Never Used   Substance Use Topics    Alcohol use: Not Currently     Comment: rarely    Drug use: No       Review of Systems   Constitutional: Positive for fever  Negative for activity change, chills and diaphoresis  HENT: Negative for congestion, ear pain, sinus pressure and sore throat  Eyes: Negative for pain and visual disturbance  Respiratory: Negative for cough, chest tightness, shortness of breath, wheezing and stridor  Cardiovascular: Negative for chest pain and palpitations  Gastrointestinal: Negative for abdominal distention, abdominal pain, constipation, diarrhea, nausea and vomiting  Genitourinary: Negative for dysuria and frequency  Musculoskeletal: Negative for neck pain and neck stiffness  Skin: Negative for rash  Neurological: Negative for dizziness, speech difficulty, light-headedness, numbness and headaches  Psychiatric/Behavioral: Negative for confusion  Physical Exam  Physical Exam  Vitals signs reviewed  Constitutional:       General: He is not in acute distress  Appearance: He is well-developed  He is not diaphoretic  HENT:      Head: Normocephalic and atraumatic  Right Ear: External ear normal       Left Ear: External ear normal       Nose: Nose normal    Eyes:      General:         Right eye: No discharge  Left eye: No discharge        Pupils: Pupils are equal, round, and reactive to light  Neck:      Musculoskeletal: Normal range of motion and neck supple  Trachea: No tracheal deviation  Cardiovascular:      Rate and Rhythm: Normal rate and regular rhythm  Heart sounds: Normal heart sounds  No murmur  Pulmonary:      Effort: Pulmonary effort is normal  No respiratory distress  Breath sounds: Normal breath sounds  No stridor  Abdominal:      General: There is no distension  Palpations: Abdomen is soft  Tenderness: There is no abdominal tenderness  There is no guarding or rebound  Musculoskeletal: Normal range of motion  Skin:     General: Skin is warm and dry  Coloration: Skin is not pale  Findings: No erythema  Neurological:      General: No focal deficit present  Mental Status: He is alert and oriented to person, place, and time  Vital Signs  ED Triage Vitals [05/24/21 1345]   Temperature Pulse Respirations Blood Pressure SpO2   99 7 °F (37 6 °C) (!) 110 20 139/82 99 %      Temp Source Heart Rate Source Patient Position - Orthostatic VS BP Location FiO2 (%)   Temporal Monitor Sitting Left arm --      Pain Score       --           Vitals:    05/24/21 1345 05/24/21 1347   BP: 139/82    Pulse: (!) 110 101   Patient Position - Orthostatic VS: Sitting          Visual Acuity      ED Medications  Medications - No data to display    Diagnostic Studies  Results Reviewed     Procedure Component Value Units Date/Time    Novel Coronavirus (Covid-19),PCR [facility] - 24 Hour Routine [[id number]] Collected: 05/24/21 1353    Lab Status:  In process Specimen: Nares from Nose Updated: 05/24/21 1356                 No orders to display              Procedures  Procedures         ED Course                                           MDM  Number of Diagnoses or Management Options  Encounter for laboratory testing for COVID-19 virus: new and requires workup  Low grade fever: new and requires workup  Diagnosis management comments:       Initial ED assessment:  41-year-old male, found the have a low-grade fever at surgeon's office, says he was told his wound is not infected he to me says he does not want any sort of workup he was told he had a come here for COVID swab, no chest pain no abdominal pain no cough no URI symptoms, he is requesting a COVID swab and then to be discharged  Understands I am not performing more intensive workup his exam was unremarkable         Amount and/or Complexity of Data Reviewed  Clinical lab tests: ordered  Review and summarize past medical records: yes        Disposition  Final diagnoses:   Low grade fever   Encounter for laboratory testing for COVID-19 virus     Time reflects when diagnosis was documented in both MDM as applicable and the Disposition within this note     Time User Action Codes Description Comment    5/24/2021  2:01 PM Nikolai KITCHEN Add [R50 9] Low grade fever     5/24/2021  2:01 PM Stephanie Jean Add [Z20 822] Encounter for laboratory testing for COVID-19 virus       ED Disposition     ED Disposition Condition Date/Time Comment    Discharge Stable Mon May 24, 2021  2:01 PM Delorse Goodpasture discharge to home/self care  Follow-up Information    None         Patient's Medications   Discharge Prescriptions    No medications on file     No discharge procedures on file      PDMP Review       Value Time User    PDMP Reviewed  Yes 4/29/2021 10:52 AM Sakina Skelton PA-C          ED Provider  Electronically Signed by           Camden Payne DO  05/24/21 7519

## 2021-05-24 NOTE — PROGRESS NOTES
Clinic Visit - General Surgery   Loralyn Gowers 37 y o  male MRN: [id number]  Unit/Bed#:  Encounter: [id number]    Assessment/Plan     Assessment:  38 yo M who is now 7 weeks s/p excision of left groin mass/hidradenitis with placement of [place]enix dermal matrix    Febrile on arrival today, no signs of infection of left groin wound, healing well    Plan:  Silver nitrate to wound bed today  Redressed with adaptic, 4x4, ABD, and ACE wrap  Can redress with calcium alginate dressing with next change if it is available  Plan to see back in one week for ongoing local wound care and STSG planning  Will obtain test for COVID19 given fever    Subjective:     Loralyn Gowers is a 37 y o  male who presents for a wound check of left groin  Patient had excision of left groin hidradenitis 7 weeks ago and has been following weekly in the clinic for wound checks  He is now having daily dressing changes with the wound care nurse  He reports some pain at the wound site and some clear drainage but no purulence  He also denies systemic signs of infection like nausea/vomiting/fever  He denies symptoms of COVID including shortness of breath  Review of Systems   Constitutional: Positive for fever  HENT: Negative  Eyes: Negative  Respiratory: Negative  Cardiovascular: Negative  Gastrointestinal: Negative  Endocrine: Negative  Genitourinary: Negative  Musculoskeletal: Negative  Skin: Positive for wound (Left groin wound)  Neurological: Negative  Psychiatric/Behavioral: Negative             Historical Information   Past Medical History:   Diagnosis Date    Coronary artery disease     [facility]     Heart disease     CAD   s/p ptca with 1 stent 2012    Hidradenitis suppurativa     groin    Hyperlipidemia     Hypertension     MI (myocardial infarction) ([address] )     " silent " M I  in the past    Morbid obesity with BMI of 50 0-59 9, adult ([address] )     Myocardial infarction ([address] )     Nicotine dependence     Obesity     Pilonidal cyst      Past Surgical History:   Procedure Laterality Date    CORONARY ANGIOPLASTY WITH STENT PLACEMENT      INCISION AND DRAINAGE OF WOUND N/A 1/24/2017    Procedure: INCISION AND DRAINAGE (I&D) BUTTOCK, PILONIDAL CYST;  Surgeon: Henry Marcelo MD;  Location: [address] [place] [address];  Service:    [facility] LEG SURGERY Left     I&D of left leg 2012    NM EXC SKIN BENIG >4 CM TRUNK,ARM,LEG Left 4/6/2021    Procedure: EXCISION THIGH MASS;  Surgeon: Micky Breaux MD;  [facility];  Service: General    NM NEG PRESS WOUND [place], < 50 CM Left 4/6/2021    Procedure: APPLICATION VAC DRESSING THIGH;  Surgeon: Micky Breaux MD;  [facility];  Service: General    WOUND DEBRIDEMENT Left 4/17/2021    Procedure: DEBRIDEMENT WOUND AND DRESSING CHANGE ([facility]); Surgeon: Lamine Bustillo DO;  [facility];  Service: General    WOUND DEBRIDEMENT Left 4/22/2021    Procedure: DEBRIDEMENT LOWER EXTREMITY [facility] OUT), left groin and thigh;  Surgeon: Micky Breaux MD;  [facility];  Service: General     Social History   Social History     Substance and Sexual Activity   Alcohol Use Not Currently    Comment: rarely     Social History     Substance and Sexual Activity   Drug Use No     Social History     Tobacco Use   Smoking Status Former Smoker    Packs/day: 1 00    Years: 20 00    Pack years: 20 00    Types: Cigarettes   Smokeless Tobacco Never Used     E-Cigarette/Vaping    E-Cigarette Use Never User      E-Cigarette/Vaping Substances    Nicotine No     THC No     CBD No     Flavoring No      Family History:   Family History   Problem Relation Age of Onset    Heart disease Father        Meds/Allergies   PTA meds:   Cannot display prior to admission medications because the patient has not been admitted in this contact       Allergies   Allergen Reactions    Amoxicillin Hives       Objective     Current Vitals:   Blood Pressure: 130/80 (05/24/21 1104)  Pulse: 102 (05/24/21 1104)  Temperature: (!) 100 8 °F (38 2 °C) (05/24/21 1104)  Weight - Scale: (!) 157 kg (347 lb) (05/24/21 1104)        Physical Exam  Constitutional:       Appearance: Normal appearance  He is obese  HENT:      Head: Normocephalic and atraumatic  Right Ear: External ear normal       Left Ear: External ear normal       Nose: Nose normal       Mouth/Throat:      Mouth: Mucous membranes are moist       Pharynx: Oropharynx is clear  Eyes:      Extraocular Movements: Extraocular movements intact  Conjunctiva/sclera: Conjunctivae normal       Pupils: Pupils are equal, round, and reactive to light  Neck:      Musculoskeletal: Normal range of motion  Cardiovascular:      Rate and Rhythm: Normal rate and regular rhythm  Pulses: Normal pulses  Pulmonary:      Effort: Pulmonary effort is normal    Abdominal:      General: Abdomen is flat  Palpations: Abdomen is soft  Tenderness: There is no abdominal tenderness  Musculoskeletal: Normal range of motion  Skin:     Comments: Left groin wound with healthy granulation tissue at the base, some clear drainage but no purulence, lateral wound edge well-apposed and healing well, see photo below   Neurological:      General: No focal deficit present  Mental Status: He is alert and oriented to person, place, and time  Psychiatric:         Mood and Affect: Mood normal          Behavior: Behavior normal               Lab Results: I have personally reviewed pertinent lab results  Imaging: I have personally reviewed pertinent reports  EKG, Pathology, and Other Studies: I have personally reviewed pertinent reports

## 2021-05-26 ENCOUNTER — APPOINTMENT (EMERGENCY)
Dept: RADIOLOGY | Facility: HOSPITAL | Age: 41
End: 2021-05-26
Payer: COMMERCIAL

## 2021-05-26 ENCOUNTER — ANESTHESIA (OUTPATIENT)
Dept: PERIOP | Facility: HOSPITAL | Age: 41
DRG: 853 | End: 2021-05-26
Payer: COMMERCIAL

## 2021-05-26 ENCOUNTER — ANESTHESIA EVENT (OUTPATIENT)
Dept: PERIOP | Facility: HOSPITAL | Age: 41
DRG: 853 | End: 2021-05-26
Payer: COMMERCIAL

## 2021-05-26 ENCOUNTER — HOSPITAL ENCOUNTER (EMERGENCY)
Facility: HOSPITAL | Age: 41
End: 2021-05-26
Attending: EMERGENCY MEDICINE | Admitting: EMERGENCY MEDICINE
Payer: COMMERCIAL

## 2021-05-26 ENCOUNTER — APPOINTMENT (OUTPATIENT)
Dept: RADIOLOGY | Facility: HOSPITAL | Age: 41
DRG: 853 | End: 2021-05-26
Payer: COMMERCIAL

## 2021-05-26 ENCOUNTER — TELEPHONE (OUTPATIENT)
Dept: SURGERY | Facility: CLINIC | Age: 41
End: 2021-05-26

## 2021-05-26 ENCOUNTER — HOSPITAL ENCOUNTER (INPATIENT)
Facility: HOSPITAL | Age: 41
LOS: 10 days | Discharge: HOME HEALTH | DRG: 853 | End: 2021-06-06
Attending: SURGERY | Admitting: SURGERY
Payer: COMMERCIAL

## 2021-05-26 VITALS
SYSTOLIC BLOOD PRESSURE: 115 MMHG | WEIGHT: 315 LBS | BODY MASS INDEX: 55.44 KG/M2 | RESPIRATION RATE: 20 BRPM | TEMPERATURE: 99.2 F | OXYGEN SATURATION: 98 % | DIASTOLIC BLOOD PRESSURE: 58 MMHG | HEART RATE: 74 BPM

## 2021-05-26 DIAGNOSIS — L02.214 CUTANEOUS ABSCESS OF GROIN: Primary | ICD-10-CM

## 2021-05-26 DIAGNOSIS — A41.9 SEPSIS (HCC): ICD-10-CM

## 2021-05-26 DIAGNOSIS — R06.02 SHORTNESS OF BREATH: ICD-10-CM

## 2021-05-26 DIAGNOSIS — L02.419 LEG ABSCESS: Primary | ICD-10-CM

## 2021-05-26 DIAGNOSIS — N17.9 AKI (ACUTE KIDNEY INJURY) (HCC): ICD-10-CM

## 2021-05-26 DIAGNOSIS — L03.116 CELLULITIS OF LEFT LOWER EXTREMITY: ICD-10-CM

## 2021-05-26 LAB
ABO GROUP BLD: NORMAL
ALBUMIN SERPL BCP-MCNC: 2.4 G/DL (ref 3.5–5)
ALP SERPL-CCNC: 99 U/L (ref 46–116)
ALT SERPL W P-5'-P-CCNC: 22 U/L (ref 12–78)
ANION GAP SERPL CALCULATED.3IONS-SCNC: 10 MMOL/L (ref 4–13)
ANION GAP SERPL CALCULATED.3IONS-SCNC: 8 MMOL/L (ref 4–13)
ANISOCYTOSIS BLD QL SMEAR: PRESENT
APTT PPP: 36 SECONDS (ref 23–37)
AST SERPL W P-5'-P-CCNC: 21 U/L (ref 5–45)
BASE EXCESS BLDA CALC-SCNC: -4.3 MMOL/L
BASOPHILS # BLD AUTO: 0.05 THOUSANDS/ΜL (ref 0–0.1)
BASOPHILS # BLD MANUAL: 0 THOUSAND/UL (ref 0–0.1)
BASOPHILS NFR BLD AUTO: 0 % (ref 0–1)
BASOPHILS NFR MAR MANUAL: 0 % (ref 0–1)
BILIRUB SERPL-MCNC: 0.42 MG/DL (ref 0.2–1)
BLD GP AB SCN SERPL QL: NEGATIVE
BUN SERPL-MCNC: 15 MG/DL (ref 5–25)
BUN SERPL-MCNC: 19 MG/DL (ref 5–25)
CA-I BLD-SCNC: 0.97 MMOL/L (ref 1.12–1.32)
CALCIUM ALBUM COR SERPL-MCNC: 9.6 MG/DL (ref 8.3–10.1)
CALCIUM SERPL-MCNC: 7.3 MG/DL (ref 8.3–10.1)
CALCIUM SERPL-MCNC: 8.3 MG/DL (ref 8.3–10.1)
CHLORIDE SERPL-SCNC: 103 MMOL/L (ref 100–108)
CHLORIDE SERPL-SCNC: 95 MMOL/L (ref 100–108)
CO2 SERPL-SCNC: 23 MMOL/L (ref 21–32)
CO2 SERPL-SCNC: 26 MMOL/L (ref 21–32)
CREAT SERPL-MCNC: 1.52 MG/DL (ref 0.6–1.3)
CREAT SERPL-MCNC: 1.71 MG/DL (ref 0.6–1.3)
EOSINOPHIL # BLD AUTO: 0 THOUSAND/ΜL (ref 0–0.61)
EOSINOPHIL # BLD MANUAL: 0 THOUSAND/UL (ref 0–0.4)
EOSINOPHIL NFR BLD AUTO: 0 % (ref 0–6)
EOSINOPHIL NFR BLD MANUAL: 0 % (ref 0–6)
ERYTHROCYTE [DISTWIDTH] IN BLOOD BY AUTOMATED COUNT: 14 % (ref 11.6–15.1)
ERYTHROCYTE [DISTWIDTH] IN BLOOD BY AUTOMATED COUNT: 14.4 % (ref 11.6–15.1)
GFR SERPL CREATININE-BSD FRML MDRD: 49 ML/MIN/1.73SQ M
GFR SERPL CREATININE-BSD FRML MDRD: 56 ML/MIN/1.73SQ M
GLUCOSE P FAST SERPL-MCNC: 137 MG/DL (ref 65–99)
GLUCOSE SERPL-MCNC: 135 MG/DL (ref 65–140)
GLUCOSE SERPL-MCNC: 137 MG/DL (ref 65–140)
GLUCOSE SERPL-MCNC: 147 MG/DL (ref 65–140)
GLUCOSE SERPL-MCNC: 175 MG/DL (ref 65–140)
HCO3 BLDA-SCNC: 20.7 MMOL/L (ref 22–28)
HCT VFR BLD AUTO: 24.8 % (ref 36.5–49.3)
HCT VFR BLD AUTO: 29.1 % (ref 36.5–49.3)
HGB BLD-MCNC: 7.5 G/DL (ref 12–17)
HGB BLD-MCNC: 9.1 G/DL (ref 12–17)
HYPERCHROMIA BLD QL SMEAR: PRESENT
IMM GRANULOCYTES # BLD AUTO: 0.2 THOUSAND/UL (ref 0–0.2)
IMM GRANULOCYTES NFR BLD AUTO: 1 % (ref 0–2)
INR PPP: 1.2 (ref 0.84–1.19)
LACTATE SERPL-SCNC: 1.1 MMOL/L (ref 0.5–2)
LACTATE SERPL-SCNC: 1.3 MMOL/L (ref 0.5–2)
LACTATE SERPL-SCNC: 1.9 MMOL/L (ref 0.5–2)
LYMPHOCYTES # BLD AUTO: 0.82 THOUSAND/UL (ref 0.6–4.47)
LYMPHOCYTES # BLD AUTO: 0.99 THOUSANDS/ΜL (ref 0.6–4.47)
LYMPHOCYTES # BLD AUTO: 6 % (ref 14–44)
LYMPHOCYTES NFR BLD AUTO: 7 % (ref 14–44)
MAGNESIUM SERPL-MCNC: 1.2 MG/DL (ref 1.6–2.6)
MCH RBC QN AUTO: 25 PG (ref 26.8–34.3)
MCH RBC QN AUTO: 25.1 PG (ref 26.8–34.3)
MCHC RBC AUTO-ENTMCNC: 30.2 G/DL (ref 31.4–37.4)
MCHC RBC AUTO-ENTMCNC: 31.3 G/DL (ref 31.4–37.4)
MCV RBC AUTO: 80 FL (ref 82–98)
MCV RBC AUTO: 83 FL (ref 82–98)
METAMYELOCYTES NFR BLD MANUAL: 9 % (ref 0–1)
MICROCYTES BLD QL AUTO: PRESENT
MONOCYTES # BLD AUTO: 0.41 THOUSAND/UL (ref 0–1.22)
MONOCYTES # BLD AUTO: 0.79 THOUSAND/ΜL (ref 0.17–1.22)
MONOCYTES NFR BLD AUTO: 5 % (ref 4–12)
MONOCYTES NFR BLD: 3 % (ref 4–12)
NASAL CANNULA: 4
NEUTROPHILS # BLD AUTO: 13.17 THOUSANDS/ΜL (ref 1.85–7.62)
NEUTROPHILS # BLD MANUAL: 11.18 THOUSAND/UL (ref 1.85–7.62)
NEUTS BAND NFR BLD MANUAL: 41 % (ref 0–8)
NEUTS SEG NFR BLD AUTO: 41 % (ref 43–75)
NEUTS SEG NFR BLD AUTO: 87 % (ref 43–75)
NRBC BLD AUTO-RTO: 0 /100 WBCS
NRBC BLD AUTO-RTO: 0 /100 WBCS
O2 CT BLDA-SCNC: 11.4 ML/DL (ref 16–23)
OVALOCYTES BLD QL SMEAR: PRESENT
OXYHGB MFR BLDA: 91.4 % (ref 94–97)
PCO2 BLDA: 37.4 MM HG (ref 36–44)
PH BLDA: 7.36 [PH] (ref 7.35–7.45)
PHOSPHATE SERPL-MCNC: 4.5 MG/DL (ref 2.7–4.5)
PLATELET # BLD AUTO: 309 THOUSANDS/UL (ref 149–390)
PLATELET # BLD AUTO: 380 THOUSANDS/UL (ref 149–390)
PLATELET BLD QL SMEAR: ADEQUATE
PMV BLD AUTO: 9.1 FL (ref 8.9–12.7)
PMV BLD AUTO: 9.1 FL (ref 8.9–12.7)
PO2 BLDA: 70.4 MM HG (ref 75–129)
POIKILOCYTOSIS BLD QL SMEAR: PRESENT
POLYCHROMASIA BLD QL SMEAR: PRESENT
POTASSIUM SERPL-SCNC: 3.8 MMOL/L (ref 3.5–5.3)
POTASSIUM SERPL-SCNC: 3.9 MMOL/L (ref 3.5–5.3)
PROT SERPL-MCNC: 7.8 G/DL (ref 6.4–8.2)
PROTHROMBIN TIME: 15.1 SECONDS (ref 11.6–14.5)
RBC # BLD AUTO: 3 MILLION/UL (ref 3.88–5.62)
RBC # BLD AUTO: 3.62 MILLION/UL (ref 3.88–5.62)
RBC MORPH BLD: PRESENT
RH BLD: POSITIVE
SARS-COV-2 RNA RESP QL NAA+PROBE: NEGATIVE
SODIUM SERPL-SCNC: 131 MMOL/L (ref 136–145)
SODIUM SERPL-SCNC: 134 MMOL/L (ref 136–145)
SPECIMEN EXPIRATION DATE: NORMAL
SPECIMEN SOURCE: ABNORMAL
TROPONIN I SERPL-MCNC: <0.02 NG/ML
WBC # BLD AUTO: 13.64 THOUSAND/UL (ref 4.31–10.16)
WBC # BLD AUTO: 15.2 THOUSAND/UL (ref 4.31–10.16)

## 2021-05-26 PROCEDURE — 86900 BLOOD TYPING SEROLOGIC ABO: CPT | Performed by: NURSE ANESTHETIST, CERTIFIED REGISTERED

## 2021-05-26 PROCEDURE — 71045 X-RAY EXAM CHEST 1 VIEW: CPT

## 2021-05-26 PROCEDURE — 87040 BLOOD CULTURE FOR BACTERIA: CPT | Performed by: PHYSICIAN ASSISTANT

## 2021-05-26 PROCEDURE — 96374 THER/PROPH/DIAG INJ IV PUSH: CPT

## 2021-05-26 PROCEDURE — 3066F NEPHROPATHY DOC TX: CPT | Performed by: SURGERY

## 2021-05-26 PROCEDURE — 82330 ASSAY OF CALCIUM: CPT | Performed by: STUDENT IN AN ORGANIZED HEALTH CARE EDUCATION/TRAINING PROGRAM

## 2021-05-26 PROCEDURE — 87205 SMEAR GRAM STAIN: CPT | Performed by: SURGERY

## 2021-05-26 PROCEDURE — 87147 CULTURE TYPE IMMUNOLOGIC: CPT | Performed by: SURGERY

## 2021-05-26 PROCEDURE — 87070 CULTURE OTHR SPECIMN AEROBIC: CPT | Performed by: SURGERY

## 2021-05-26 PROCEDURE — 82948 REAGENT STRIP/BLOOD GLUCOSE: CPT

## 2021-05-26 PROCEDURE — 80048 BASIC METABOLIC PNL TOTAL CA: CPT | Performed by: STUDENT IN AN ORGANIZED HEALTH CARE EDUCATION/TRAINING PROGRAM

## 2021-05-26 PROCEDURE — 11042 DBRDMT SUBQ TIS 1ST 20SQCM/<: CPT | Performed by: SURGERY

## 2021-05-26 PROCEDURE — 83605 ASSAY OF LACTIC ACID: CPT | Performed by: NURSE ANESTHETIST, CERTIFIED REGISTERED

## 2021-05-26 PROCEDURE — 86850 RBC ANTIBODY SCREEN: CPT | Performed by: NURSE ANESTHETIST, CERTIFIED REGISTERED

## 2021-05-26 PROCEDURE — U0005 INFEC AGEN DETEC AMPLI PROBE: HCPCS | Performed by: PHYSICIAN ASSISTANT

## 2021-05-26 PROCEDURE — 83605 ASSAY OF LACTIC ACID: CPT | Performed by: STUDENT IN AN ORGANIZED HEALTH CARE EDUCATION/TRAINING PROGRAM

## 2021-05-26 PROCEDURE — 36415 COLL VENOUS BLD VENIPUNCTURE: CPT | Performed by: PHYSICIAN ASSISTANT

## 2021-05-26 PROCEDURE — 85025 COMPLETE CBC W/AUTO DIFF WBC: CPT | Performed by: PHYSICIAN ASSISTANT

## 2021-05-26 PROCEDURE — 96365 THER/PROPH/DIAG IV INF INIT: CPT

## 2021-05-26 PROCEDURE — 0JBM0ZZ EXCISION OF LEFT UPPER LEG SUBCUTANEOUS TISSUE AND FASCIA, OPEN APPROACH: ICD-10-PCS | Performed by: SURGERY

## 2021-05-26 PROCEDURE — 0J9M0ZZ DRAINAGE OF LEFT UPPER LEG SUBCUTANEOUS TISSUE AND FASCIA, OPEN APPROACH: ICD-10-PCS | Performed by: SURGERY

## 2021-05-26 PROCEDURE — 82330 ASSAY OF CALCIUM: CPT

## 2021-05-26 PROCEDURE — 74177 CT ABD & PELVIS W/CONTRAST: CPT

## 2021-05-26 PROCEDURE — 93005 ELECTROCARDIOGRAM TRACING: CPT

## 2021-05-26 PROCEDURE — 84100 ASSAY OF PHOSPHORUS: CPT | Performed by: STUDENT IN AN ORGANIZED HEALTH CARE EDUCATION/TRAINING PROGRAM

## 2021-05-26 PROCEDURE — 99285 EMERGENCY DEPT VISIT HI MDM: CPT | Performed by: PHYSICIAN ASSISTANT

## 2021-05-26 PROCEDURE — 85730 THROMBOPLASTIN TIME PARTIAL: CPT | Performed by: PHYSICIAN ASSISTANT

## 2021-05-26 PROCEDURE — 86923 COMPATIBILITY TEST ELECTRIC: CPT

## 2021-05-26 PROCEDURE — 02HV33Z INSERTION OF INFUSION DEVICE INTO SUPERIOR VENA CAVA, PERCUTANEOUS APPROACH: ICD-10-PCS | Performed by: STUDENT IN AN ORGANIZED HEALTH CARE EDUCATION/TRAINING PROGRAM

## 2021-05-26 PROCEDURE — 85027 COMPLETE CBC AUTOMATED: CPT | Performed by: STUDENT IN AN ORGANIZED HEALTH CARE EDUCATION/TRAINING PROGRAM

## 2021-05-26 PROCEDURE — 85014 HEMATOCRIT: CPT

## 2021-05-26 PROCEDURE — 96375 TX/PRO/DX INJ NEW DRUG ADDON: CPT

## 2021-05-26 PROCEDURE — 4A133B1 MONITORING OF ARTERIAL PRESSURE, PERIPHERAL, PERCUTANEOUS APPROACH: ICD-10-PCS | Performed by: STUDENT IN AN ORGANIZED HEALTH CARE EDUCATION/TRAINING PROGRAM

## 2021-05-26 PROCEDURE — 82805 BLOOD GASES W/O2 SATURATION: CPT | Performed by: STUDENT IN AN ORGANIZED HEALTH CARE EDUCATION/TRAINING PROGRAM

## 2021-05-26 PROCEDURE — 99285 EMERGENCY DEPT VISIT HI MDM: CPT

## 2021-05-26 PROCEDURE — 4A133J1 MONITORING OF ARTERIAL PULSE, PERIPHERAL, PERCUTANEOUS APPROACH: ICD-10-PCS | Performed by: STUDENT IN AN ORGANIZED HEALTH CARE EDUCATION/TRAINING PROGRAM

## 2021-05-26 PROCEDURE — 85610 PROTHROMBIN TIME: CPT | Performed by: PHYSICIAN ASSISTANT

## 2021-05-26 PROCEDURE — U0003 INFECTIOUS AGENT DETECTION BY NUCLEIC ACID (DNA OR RNA); SEVERE ACUTE RESPIRATORY SYNDROME CORONAVIRUS 2 (SARS-COV-2) (CORONAVIRUS DISEASE [COVID-19]), AMPLIFIED PROBE TECHNIQUE, MAKING USE OF HIGH THROUGHPUT TECHNOLOGIES AS DESCRIBED BY CMS-2020-01-R: HCPCS | Performed by: PHYSICIAN ASSISTANT

## 2021-05-26 PROCEDURE — 96361 HYDRATE IV INFUSION ADD-ON: CPT

## 2021-05-26 PROCEDURE — 82947 ASSAY GLUCOSE BLOOD QUANT: CPT

## 2021-05-26 PROCEDURE — 82803 BLOOD GASES ANY COMBINATION: CPT

## 2021-05-26 PROCEDURE — 99222 1ST HOSP IP/OBS MODERATE 55: CPT | Performed by: SURGERY

## 2021-05-26 PROCEDURE — 80053 COMPREHEN METABOLIC PANEL: CPT | Performed by: PHYSICIAN ASSISTANT

## 2021-05-26 PROCEDURE — 85007 BL SMEAR W/DIFF WBC COUNT: CPT | Performed by: STUDENT IN AN ORGANIZED HEALTH CARE EDUCATION/TRAINING PROGRAM

## 2021-05-26 PROCEDURE — 96367 TX/PROPH/DG ADDL SEQ IV INF: CPT

## 2021-05-26 PROCEDURE — 99291 CRITICAL CARE FIRST HOUR: CPT | Performed by: SURGERY

## 2021-05-26 PROCEDURE — 86901 BLOOD TYPING SEROLOGIC RH(D): CPT | Performed by: NURSE ANESTHETIST, CERTIFIED REGISTERED

## 2021-05-26 PROCEDURE — 84132 ASSAY OF SERUM POTASSIUM: CPT

## 2021-05-26 PROCEDURE — 03HC33Z INSERTION OF INFUSION DEVICE INTO LEFT RADIAL ARTERY, PERCUTANEOUS APPROACH: ICD-10-PCS | Performed by: STUDENT IN AN ORGANIZED HEALTH CARE EDUCATION/TRAINING PROGRAM

## 2021-05-26 PROCEDURE — 30233N1 TRANSFUSION OF NONAUTOLOGOUS RED BLOOD CELLS INTO PERIPHERAL VEIN, PERCUTANEOUS APPROACH: ICD-10-PCS | Performed by: SURGERY

## 2021-05-26 PROCEDURE — 84295 ASSAY OF SERUM SODIUM: CPT

## 2021-05-26 PROCEDURE — NC001 PR NO CHARGE: Performed by: SURGERY

## 2021-05-26 PROCEDURE — 83735 ASSAY OF MAGNESIUM: CPT | Performed by: STUDENT IN AN ORGANIZED HEALTH CARE EDUCATION/TRAINING PROGRAM

## 2021-05-26 PROCEDURE — 83605 ASSAY OF LACTIC ACID: CPT | Performed by: PHYSICIAN ASSISTANT

## 2021-05-26 PROCEDURE — 10140 I&D HMTMA SEROMA/FLUID COLLJ: CPT | Performed by: SURGERY

## 2021-05-26 PROCEDURE — 87075 CULTR BACTERIA EXCEPT BLOOD: CPT | Performed by: SURGERY

## 2021-05-26 PROCEDURE — 84484 ASSAY OF TROPONIN QUANT: CPT | Performed by: PHYSICIAN ASSISTANT

## 2021-05-26 RX ORDER — HYDROMORPHONE HCL/PF 1 MG/ML
1 SYRINGE (ML) INJECTION
Status: DISCONTINUED | OUTPATIENT
Start: 2021-05-26 | End: 2021-05-27

## 2021-05-26 RX ORDER — SODIUM CHLORIDE 9 MG/ML
125 INJECTION, SOLUTION INTRAVENOUS CONTINUOUS
Status: DISCONTINUED | OUTPATIENT
Start: 2021-05-26 | End: 2021-05-27

## 2021-05-26 RX ORDER — KETAMINE HCL IN NACL, ISO-OSM 100MG/10ML
SYRINGE (ML) INJECTION AS NEEDED
Status: DISCONTINUED | OUTPATIENT
Start: 2021-05-26 | End: 2021-05-26

## 2021-05-26 RX ORDER — ACETAMINOPHEN 325 MG/1
975 TABLET ORAL ONCE
Status: COMPLETED | OUTPATIENT
Start: 2021-05-26 | End: 2021-05-26

## 2021-05-26 RX ORDER — CHLORHEXIDINE GLUCONATE 0.12 MG/ML
15 RINSE ORAL EVERY 12 HOURS SCHEDULED
Status: DISCONTINUED | OUTPATIENT
Start: 2021-05-26 | End: 2021-05-29

## 2021-05-26 RX ORDER — FENTANYL CITRATE 50 UG/ML
50 INJECTION, SOLUTION INTRAMUSCULAR; INTRAVENOUS EVERY 2 HOUR PRN
Status: DISCONTINUED | OUTPATIENT
Start: 2021-05-26 | End: 2021-05-26

## 2021-05-26 RX ORDER — METOCLOPRAMIDE HYDROCHLORIDE 5 MG/ML
10 INJECTION INTRAMUSCULAR; INTRAVENOUS ONCE AS NEEDED
Status: DISCONTINUED | OUTPATIENT
Start: 2021-05-26 | End: 2021-05-26 | Stop reason: HOSPADM

## 2021-05-26 RX ORDER — OXYCODONE HYDROCHLORIDE 5 MG/1
5 TABLET ORAL EVERY 4 HOURS PRN
Status: DISCONTINUED | OUTPATIENT
Start: 2021-05-26 | End: 2021-05-27

## 2021-05-26 RX ORDER — POTASSIUM CHLORIDE 14.9 MG/ML
20 INJECTION INTRAVENOUS ONCE
Status: COMPLETED | OUTPATIENT
Start: 2021-05-26 | End: 2021-05-26

## 2021-05-26 RX ORDER — MAGNESIUM SULFATE HEPTAHYDRATE 40 MG/ML
2 INJECTION, SOLUTION INTRAVENOUS ONCE
Status: COMPLETED | OUTPATIENT
Start: 2021-05-26 | End: 2021-05-26

## 2021-05-26 RX ORDER — AMOXICILLIN 250 MG
2 CAPSULE ORAL 2 TIMES DAILY
Status: DISCONTINUED | OUTPATIENT
Start: 2021-05-26 | End: 2021-06-06 | Stop reason: HOSPADM

## 2021-05-26 RX ORDER — HEPARIN SODIUM 5000 [USP'U]/ML
7500 INJECTION, SOLUTION INTRAVENOUS; SUBCUTANEOUS EVERY 8 HOURS SCHEDULED
Status: DISCONTINUED | OUTPATIENT
Start: 2021-05-26 | End: 2021-05-26

## 2021-05-26 RX ORDER — MIDAZOLAM HYDROCHLORIDE 2 MG/2ML
INJECTION, SOLUTION INTRAMUSCULAR; INTRAVENOUS AS NEEDED
Status: DISCONTINUED | OUTPATIENT
Start: 2021-05-26 | End: 2021-05-26

## 2021-05-26 RX ORDER — SODIUM CHLORIDE, SODIUM LACTATE, POTASSIUM CHLORIDE, CALCIUM CHLORIDE 600; 310; 30; 20 MG/100ML; MG/100ML; MG/100ML; MG/100ML
INJECTION, SOLUTION INTRAVENOUS CONTINUOUS PRN
Status: DISCONTINUED | OUTPATIENT
Start: 2021-05-26 | End: 2021-05-26

## 2021-05-26 RX ORDER — PROPOFOL 10 MG/ML
INJECTION, EMULSION INTRAVENOUS AS NEEDED
Status: DISCONTINUED | OUTPATIENT
Start: 2021-05-26 | End: 2021-05-26

## 2021-05-26 RX ORDER — ONDANSETRON 2 MG/ML
INJECTION INTRAMUSCULAR; INTRAVENOUS AS NEEDED
Status: DISCONTINUED | OUTPATIENT
Start: 2021-05-26 | End: 2021-05-26

## 2021-05-26 RX ORDER — FENTANYL CITRATE/PF 50 MCG/ML
50 SYRINGE (ML) INJECTION
Status: COMPLETED | OUTPATIENT
Start: 2021-05-26 | End: 2021-05-26

## 2021-05-26 RX ORDER — LANOLIN ALCOHOL/MO/W.PET/CERES
6 CREAM (GRAM) TOPICAL
Status: DISCONTINUED | OUTPATIENT
Start: 2021-05-26 | End: 2021-06-06 | Stop reason: HOSPADM

## 2021-05-26 RX ORDER — HYDROMORPHONE HCL/PF 1 MG/ML
0.5 SYRINGE (ML) INJECTION
Status: DISCONTINUED | OUTPATIENT
Start: 2021-05-26 | End: 2021-05-26

## 2021-05-26 RX ORDER — SODIUM CHLORIDE 9 MG/ML
INJECTION, SOLUTION INTRAVENOUS CONTINUOUS PRN
Status: DISCONTINUED | OUTPATIENT
Start: 2021-05-26 | End: 2021-05-26

## 2021-05-26 RX ORDER — CALCIUM GLUCONATE 20 MG/ML
1 INJECTION, SOLUTION INTRAVENOUS ONCE
Status: COMPLETED | OUTPATIENT
Start: 2021-05-26 | End: 2021-05-27

## 2021-05-26 RX ORDER — GLYCOPYRROLATE 0.2 MG/ML
INJECTION INTRAMUSCULAR; INTRAVENOUS AS NEEDED
Status: DISCONTINUED | OUTPATIENT
Start: 2021-05-26 | End: 2021-05-26

## 2021-05-26 RX ORDER — ALBUTEROL SULFATE 90 UG/1
AEROSOL, METERED RESPIRATORY (INHALATION) AS NEEDED
Status: DISCONTINUED | OUTPATIENT
Start: 2021-05-26 | End: 2021-05-26

## 2021-05-26 RX ORDER — KETOROLAC TROMETHAMINE 30 MG/ML
30 INJECTION, SOLUTION INTRAMUSCULAR; INTRAVENOUS ONCE
Status: COMPLETED | OUTPATIENT
Start: 2021-05-26 | End: 2021-05-26

## 2021-05-26 RX ORDER — AMLODIPINE BESYLATE 10 MG/1
10 TABLET ORAL DAILY
Status: DISCONTINUED | OUTPATIENT
Start: 2021-05-27 | End: 2021-05-26

## 2021-05-26 RX ORDER — MAGNESIUM HYDROXIDE 1200 MG/15ML
LIQUID ORAL AS NEEDED
Status: DISCONTINUED | OUTPATIENT
Start: 2021-05-26 | End: 2021-05-26 | Stop reason: HOSPADM

## 2021-05-26 RX ORDER — ONDANSETRON 2 MG/ML
4 INJECTION INTRAMUSCULAR; INTRAVENOUS ONCE
Status: COMPLETED | OUTPATIENT
Start: 2021-05-26 | End: 2021-05-26

## 2021-05-26 RX ORDER — DOCUSATE SODIUM 100 MG/1
100 CAPSULE, LIQUID FILLED ORAL 2 TIMES DAILY
Status: DISCONTINUED | OUTPATIENT
Start: 2021-05-26 | End: 2021-05-26

## 2021-05-26 RX ORDER — HYDROMORPHONE HCL/PF 1 MG/ML
1 SYRINGE (ML) INJECTION ONCE
Status: COMPLETED | OUTPATIENT
Start: 2021-05-26 | End: 2021-05-26

## 2021-05-26 RX ORDER — FENTANYL CITRATE 50 UG/ML
INJECTION, SOLUTION INTRAMUSCULAR; INTRAVENOUS AS NEEDED
Status: DISCONTINUED | OUTPATIENT
Start: 2021-05-26 | End: 2021-05-26

## 2021-05-26 RX ORDER — MORPHINE SULFATE 4 MG/ML
4 INJECTION, SOLUTION INTRAMUSCULAR; INTRAVENOUS ONCE
Status: COMPLETED | OUTPATIENT
Start: 2021-05-26 | End: 2021-05-26

## 2021-05-26 RX ORDER — OXYCODONE HYDROCHLORIDE 10 MG/1
10 TABLET ORAL EVERY 4 HOURS PRN
Status: DISCONTINUED | OUTPATIENT
Start: 2021-05-26 | End: 2021-05-27

## 2021-05-26 RX ORDER — CLINDAMYCIN PHOSPHATE 900 MG/50ML
INJECTION INTRAVENOUS AS NEEDED
Status: DISCONTINUED | OUTPATIENT
Start: 2021-05-26 | End: 2021-05-26

## 2021-05-26 RX ORDER — LIDOCAINE HYDROCHLORIDE 10 MG/ML
INJECTION, SOLUTION EPIDURAL; INFILTRATION; INTRACAUDAL; PERINEURAL AS NEEDED
Status: DISCONTINUED | OUTPATIENT
Start: 2021-05-26 | End: 2021-05-26

## 2021-05-26 RX ORDER — POLYETHYLENE GLYCOL 3350 17 G/17G
17 POWDER, FOR SOLUTION ORAL DAILY
Status: DISCONTINUED | OUTPATIENT
Start: 2021-05-27 | End: 2021-06-06 | Stop reason: HOSPADM

## 2021-05-26 RX ORDER — SODIUM CHLORIDE, SODIUM GLUCONATE, SODIUM ACETATE, POTASSIUM CHLORIDE, MAGNESIUM CHLORIDE, SODIUM PHOSPHATE, DIBASIC, AND POTASSIUM PHOSPHATE .53; .5; .37; .037; .03; .012; .00082 G/100ML; G/100ML; G/100ML; G/100ML; G/100ML; G/100ML; G/100ML
125 INJECTION, SOLUTION INTRAVENOUS CONTINUOUS
Status: DISCONTINUED | OUTPATIENT
Start: 2021-05-26 | End: 2021-05-27

## 2021-05-26 RX ORDER — POLYETHYLENE GLYCOL 3350 17 G/17G
17 POWDER, FOR SOLUTION ORAL DAILY
Status: DISCONTINUED | OUTPATIENT
Start: 2021-05-26 | End: 2021-05-26

## 2021-05-26 RX ORDER — SUCCINYLCHOLINE/SOD CL,ISO/PF 100 MG/5ML
SYRINGE (ML) INTRAVENOUS AS NEEDED
Status: DISCONTINUED | OUTPATIENT
Start: 2021-05-26 | End: 2021-05-26

## 2021-05-26 RX ORDER — HYDROMORPHONE HCL/PF 1 MG/ML
0.5 SYRINGE (ML) INJECTION
Status: DISCONTINUED | OUTPATIENT
Start: 2021-05-26 | End: 2021-05-26 | Stop reason: HOSPADM

## 2021-05-26 RX ORDER — CARVEDILOL 6.25 MG/1
6.25 TABLET ORAL 2 TIMES DAILY WITH MEALS
Status: DISCONTINUED | OUTPATIENT
Start: 2021-05-26 | End: 2021-05-26

## 2021-05-26 RX ORDER — CLINDAMYCIN PHOSPHATE 600 MG/50ML
600 INJECTION INTRAVENOUS EVERY 8 HOURS
Status: DISCONTINUED | OUTPATIENT
Start: 2021-05-26 | End: 2021-05-27

## 2021-05-26 RX ORDER — HYDROMORPHONE HCL/PF 1 MG/ML
0.5 SYRINGE (ML) INJECTION ONCE
Status: COMPLETED | OUTPATIENT
Start: 2021-05-26 | End: 2021-05-26

## 2021-05-26 RX ORDER — HEPARIN SODIUM 5000 [USP'U]/ML
5000 INJECTION, SOLUTION INTRAVENOUS; SUBCUTANEOUS EVERY 8 HOURS SCHEDULED
Status: DISCONTINUED | OUTPATIENT
Start: 2021-05-26 | End: 2021-05-26

## 2021-05-26 RX ORDER — LIDOCAINE HYDROCHLORIDE 10 MG/ML
10 INJECTION, SOLUTION EPIDURAL; INFILTRATION; INTRACAUDAL; PERINEURAL ONCE
Status: DISCONTINUED | OUTPATIENT
Start: 2021-05-26 | End: 2021-05-26

## 2021-05-26 RX ORDER — ACETAMINOPHEN 325 MG/1
975 TABLET ORAL EVERY 8 HOURS SCHEDULED
Status: DISCONTINUED | OUTPATIENT
Start: 2021-05-26 | End: 2021-06-06 | Stop reason: HOSPADM

## 2021-05-26 RX ORDER — BISACODYL 10 MG
10 SUPPOSITORY, RECTAL RECTAL DAILY PRN
Status: DISCONTINUED | OUTPATIENT
Start: 2021-05-26 | End: 2021-06-06 | Stop reason: HOSPADM

## 2021-05-26 RX ORDER — ONDANSETRON 2 MG/ML
4 INJECTION INTRAMUSCULAR; INTRAVENOUS EVERY 6 HOURS PRN
Status: DISCONTINUED | OUTPATIENT
Start: 2021-05-26 | End: 2021-06-06 | Stop reason: HOSPADM

## 2021-05-26 RX ORDER — LIDOCAINE HYDROCHLORIDE 10 MG/ML
10 INJECTION, SOLUTION EPIDURAL; INFILTRATION; INTRACAUDAL; PERINEURAL ONCE
Status: COMPLETED | OUTPATIENT
Start: 2021-05-26 | End: 2021-05-26

## 2021-05-26 RX ORDER — CEFAZOLIN SODIUM 2 G/50ML
2000 SOLUTION INTRAVENOUS EVERY 8 HOURS
Status: DISCONTINUED | OUTPATIENT
Start: 2021-05-26 | End: 2021-05-27

## 2021-05-26 RX ADMIN — FENTANYL CITRATE 50 MCG: 50 INJECTION INTRAMUSCULAR; INTRAVENOUS at 18:30

## 2021-05-26 RX ADMIN — LIDOCAINE HYDROCHLORIDE 100 MG: 10 INJECTION, SOLUTION EPIDURAL; INFILTRATION; INTRACAUDAL; PERINEURAL at 18:30

## 2021-05-26 RX ADMIN — SODIUM CHLORIDE 1000 ML: 0.9 INJECTION, SOLUTION INTRAVENOUS at 09:59

## 2021-05-26 RX ADMIN — MORPHINE SULFATE 4 MG: 4 INJECTION INTRAVENOUS at 09:58

## 2021-05-26 RX ADMIN — PHENYLEPHRINE HYDROCHLORIDE 200 MCG: 10 INJECTION INTRAVENOUS at 19:10

## 2021-05-26 RX ADMIN — HYDROMORPHONE HYDROCHLORIDE 0.5 MG: 1 INJECTION, SOLUTION INTRAMUSCULAR; INTRAVENOUS; SUBCUTANEOUS at 14:47

## 2021-05-26 RX ADMIN — SODIUM CHLORIDE, SODIUM LACTATE, POTASSIUM CHLORIDE, AND CALCIUM CHLORIDE: .6; .31; .03; .02 INJECTION, SOLUTION INTRAVENOUS at 19:33

## 2021-05-26 RX ADMIN — Medication 50 MCG: at 20:08

## 2021-05-26 RX ADMIN — ACETAMINOPHEN 975 MG: 325 TABLET, FILM COATED ORAL at 15:59

## 2021-05-26 RX ADMIN — PHENYLEPHRINE HYDROCHLORIDE 200 MCG: 10 INJECTION INTRAVENOUS at 19:04

## 2021-05-26 RX ADMIN — HYDROMORPHONE HYDROCHLORIDE 0.5 MG: 1 INJECTION, SOLUTION INTRAMUSCULAR; INTRAVENOUS; SUBCUTANEOUS at 17:48

## 2021-05-26 RX ADMIN — SODIUM CHLORIDE: 0.9 INJECTION, SOLUTION INTRAVENOUS at 18:32

## 2021-05-26 RX ADMIN — CEFAZOLIN SODIUM 2000 MG: 2 SOLUTION INTRAVENOUS at 14:58

## 2021-05-26 RX ADMIN — SODIUM CHLORIDE: 0.9 INJECTION, SOLUTION INTRAVENOUS at 18:49

## 2021-05-26 RX ADMIN — ACETAMINOPHEN 975 MG: 325 TABLET, FILM COATED ORAL at 09:53

## 2021-05-26 RX ADMIN — ACETAMINOPHEN 975 MG: 325 TABLET, FILM COATED ORAL at 21:48

## 2021-05-26 RX ADMIN — PROPOFOL 300 MG: 10 INJECTION, EMULSION INTRAVENOUS at 18:30

## 2021-05-26 RX ADMIN — PHENYLEPHRINE HYDROCHLORIDE 200 MCG: 10 INJECTION INTRAVENOUS at 19:13

## 2021-05-26 RX ADMIN — MIDAZOLAM 2 MG: 1 INJECTION INTRAMUSCULAR; INTRAVENOUS at 18:27

## 2021-05-26 RX ADMIN — LIDOCAINE HYDROCHLORIDE 10 ML: 10 INJECTION, SOLUTION EPIDURAL; INFILTRATION; INTRACAUDAL; PERINEURAL at 14:16

## 2021-05-26 RX ADMIN — HYDROMORPHONE HYDROCHLORIDE 0.5 MG: 1 INJECTION, SOLUTION INTRAMUSCULAR; INTRAVENOUS; SUBCUTANEOUS at 20:42

## 2021-05-26 RX ADMIN — KETOROLAC TROMETHAMINE 30 MG: 30 INJECTION, SOLUTION INTRAMUSCULAR at 09:54

## 2021-05-26 RX ADMIN — OXYCODONE HYDROCHLORIDE 10 MG: 10 TABLET ORAL at 22:03

## 2021-05-26 RX ADMIN — CALCIUM GLUCONATE 1 G: 20 INJECTION, SOLUTION INTRAVENOUS at 23:39

## 2021-05-26 RX ADMIN — SODIUM CHLORIDE 100 ML/HR: 0.9 INJECTION, SOLUTION INTRAVENOUS at 16:00

## 2021-05-26 RX ADMIN — IOHEXOL 100 ML: 350 INJECTION, SOLUTION INTRAVENOUS at 10:22

## 2021-05-26 RX ADMIN — Medication 200 MG: at 18:30

## 2021-05-26 RX ADMIN — SODIUM CHLORIDE, SODIUM GLUCONATE, SODIUM ACETATE, POTASSIUM CHLORIDE, MAGNESIUM CHLORIDE, SODIUM PHOSPHATE, DIBASIC, AND POTASSIUM PHOSPHATE 125 ML/HR: .53; .5; .37; .037; .03; .012; .00082 INJECTION, SOLUTION INTRAVENOUS at 23:22

## 2021-05-26 RX ADMIN — ONDANSETRON 4 MG: 2 INJECTION INTRAMUSCULAR; INTRAVENOUS at 18:36

## 2021-05-26 RX ADMIN — VANCOMYCIN HYDROCHLORIDE 1500 MG: 10 INJECTION, POWDER, LYOPHILIZED, FOR SOLUTION INTRAVENOUS at 12:18

## 2021-05-26 RX ADMIN — SODIUM CHLORIDE, SODIUM LACTATE, POTASSIUM CHLORIDE, AND CALCIUM CHLORIDE: .6; .31; .03; .02 INJECTION, SOLUTION INTRAVENOUS at 18:04

## 2021-05-26 RX ADMIN — GLYCOPYRROLATE 0.2 MG: 0.2 INJECTION, SOLUTION INTRAMUSCULAR; INTRAVENOUS at 18:27

## 2021-05-26 RX ADMIN — METRONIDAZOLE 500 MG: 500 INJECTION, SOLUTION INTRAVENOUS at 18:35

## 2021-05-26 RX ADMIN — ALBUTEROL SULFATE 4 PUFF: 90 AEROSOL, METERED RESPIRATORY (INHALATION) at 18:15

## 2021-05-26 RX ADMIN — CEFAZOLIN SODIUM 2000 MG: 2 SOLUTION INTRAVENOUS at 23:45

## 2021-05-26 RX ADMIN — HYDROMORPHONE HYDROCHLORIDE 1 MG: 1 INJECTION, SOLUTION INTRAMUSCULAR; INTRAVENOUS; SUBCUTANEOUS at 23:15

## 2021-05-26 RX ADMIN — DOCUSATE SODIUM AND SENNOSIDES 2 TABLET: 8.6; 5 TABLET ORAL at 22:53

## 2021-05-26 RX ADMIN — MAGNESIUM SULFATE HEPTAHYDRATE 2 G: 40 INJECTION, SOLUTION INTRAVENOUS at 21:47

## 2021-05-26 RX ADMIN — HYDROMORPHONE HYDROCHLORIDE 1 MG: 1 INJECTION, SOLUTION INTRAMUSCULAR; INTRAVENOUS; SUBCUTANEOUS at 11:25

## 2021-05-26 RX ADMIN — HEPARIN SODIUM 7500 UNITS: 5000 INJECTION INTRAVENOUS; SUBCUTANEOUS at 21:47

## 2021-05-26 RX ADMIN — CLINDAMYCIN PHOSPHATE 900 MG: 900 INJECTION, SOLUTION INTRAVENOUS at 18:45

## 2021-05-26 RX ADMIN — ONDANSETRON 4 MG: 2 INJECTION INTRAMUSCULAR; INTRAVENOUS at 09:58

## 2021-05-26 RX ADMIN — Medication 50 MCG: at 19:54

## 2021-05-26 RX ADMIN — VASOPRESSIN 0.04 UNITS/MIN: 20 INJECTION INTRAVENOUS at 19:17

## 2021-05-26 RX ADMIN — NOREPINEPHRINE BITARTRATE 4 MCG/MIN: 1 INJECTION, SOLUTION, CONCENTRATE INTRAVENOUS at 18:30

## 2021-05-26 RX ADMIN — PHENYLEPHRINE HYDROCHLORIDE 200 MCG: 10 INJECTION INTRAVENOUS at 19:07

## 2021-05-26 RX ADMIN — PHENYLEPHRINE HYDROCHLORIDE 200 MCG: 10 INJECTION INTRAVENOUS at 18:51

## 2021-05-26 RX ADMIN — METRONIDAZOLE 500 MG: 500 INJECTION, SOLUTION INTRAVENOUS at 10:50

## 2021-05-26 RX ADMIN — ONDANSETRON 4 MG: 2 INJECTION INTRAMUSCULAR; INTRAVENOUS at 23:19

## 2021-05-26 RX ADMIN — AZTREONAM 2000 MG: 2 INJECTION, POWDER, LYOPHILIZED, FOR SOLUTION INTRAMUSCULAR; INTRAVENOUS at 11:26

## 2021-05-26 RX ADMIN — Medication 20 MG: at 18:40

## 2021-05-26 RX ADMIN — CHLORHEXIDINE GLUCONATE 15 ML: 1.2 SOLUTION ORAL at 22:53

## 2021-05-26 RX ADMIN — Medication 3000 MG: at 18:22

## 2021-05-26 RX ADMIN — PHENYLEPHRINE HYDROCHLORIDE 100 MCG: 10 INJECTION INTRAVENOUS at 18:29

## 2021-05-26 RX ADMIN — SODIUM CHLORIDE 1000 ML: 0.9 INJECTION, SOLUTION INTRAVENOUS at 11:17

## 2021-05-26 RX ADMIN — SODIUM CHLORIDE: 0.9 INJECTION, SOLUTION INTRAVENOUS at 19:21

## 2021-05-26 RX ADMIN — MELATONIN TAB 3 MG 6 MG: 3 TAB at 22:53

## 2021-05-26 RX ADMIN — POTASSIUM CHLORIDE 20 MEQ: 14.9 INJECTION, SOLUTION INTRAVENOUS at 21:47

## 2021-05-26 RX ADMIN — PHENYLEPHRINE HYDROCHLORIDE 200 MCG: 10 INJECTION INTRAVENOUS at 18:30

## 2021-05-26 NOTE — TELEPHONE ENCOUNTER
Todd Ospina, Visiting Nurse, called stating that the patient is being sent to the ED  Today he had a fever of 105 4 with nausea and not able to eat or have fluids  He had a COVID test 5/24/21 that was Negative

## 2021-05-26 NOTE — QUICK NOTE
General Surgery Progress Note    Called by ED to re-evaluate patient given precipitous drop in blood pressure  On presentation he had been in 's but on our evaluation patient complaining of lightheadedness with SBP 70's  Patient also spiked temp to 103  Examination of the left lower extremity revealed erythema extending past markings with notable tenderness from groin to ankle  Based on these findings, concern for necrotizing soft tissue infection  We will therefore take patient to OR for exploration of the left lower extremity, including but not limited to incision and drainage, debridement, all indicated procedures  Patient was in agreement with this plan of care and provided informed consent      Leela Campo MD

## 2021-05-26 NOTE — ANESTHESIA PREPROCEDURE EVALUATION
Procedure:  DEBRIDEMENT LOWER EXTREMITY ([facility]) (Left Knee)    Relevant Problems   CARDIO   (+) CAD (coronary artery disease)   (+) HTN (hypertension)   (+) Hypertriglyceridemia   (+) Type 1 non-ST elevation myocardial infarction (NSTEMI) (HCC)      ENDO   (+) DM type 2, uncontrolled, with neuropathy (HCC)        Physical Exam    Airway    Mallampati score: II  TM Distance: >3 FB  Neck ROM: full     Dental       Cardiovascular      Pulmonary      Other Findings        Anesthesia Plan  ASA Score- 3 Emergent    Anesthesia Type- general with ASA Monitors  Additional Monitors:   Airway Plan: ETT  Plan Factors-Exercise tolerance (METS): >4 METS  Chart reviewed  EKG reviewed  Existing labs reviewed  Patient summary reviewed  Patient is not a current smoker  There is medical exclusion for perioperative obstructive sleep apnea risk education  Induction- intravenous  Postoperative Plan- Plan for postoperative opioid use  Informed Consent- Anesthetic plan and risks discussed with patient  I personally reviewed this patient with the CRNA  Discussed and agreed on the Anesthesia Plan with the CRNA  Yon Do

## 2021-05-26 NOTE — ANESTHESIA PROCEDURE NOTES
Central Line Insertion  Performed by: Princess Kalyan CRNA  Authorized by: Ashlee Julien MD     Date/Time: 5/26/2021 7:13 PM  Catheter Type:  triple lumen  Indications: vascular access  Location details: right internal jugular  Catheter size: 7 Fr  Patient position: Trendelenburg  Assessment: blood return through all ports and free fluid flow  Preparation: skin prepped with 2% chlorhexidine  Skin prep agent dried: skin prep agent completely dried prior to procedure  Sterile barriers: all five maximum sterile barriers used - cap, mask, sterile gown, sterile gloves, and large sterile sheet  Hand hygiene: hand hygiene performed prior to central venous catheter insertion  Ultrasound guidance: yes  sterile gel and probe cover used in ultrasound-guided central venous catheter insertionPre-procedure: landmarks identified  Number of attempts: 1  Successful placement: yes  Post-procedure: chlorhexidine patch applied,  dressing applied and line sutured  Patient tolerance: patient tolerated the procedure well with no immediate complications

## 2021-05-26 NOTE — PROCEDURES
Incision and drain    Date/Time: 5/26/2021 2:50 PM  Performed by: Celeste Oropeza MD  Authorized by: Celetse Oropeza MD   Universal Protocol:  Consent: Verbal consent obtained  Risks and benefits: risks, benefits and alternatives were discussed  Consent given by: patient  Patient understanding: patient states understanding of the procedure being performed  Site marked: the operative site was marked  Radiology Images displayed and confirmed  If images not available, report reviewed: imaging studies available      Patient location:  ED  Location:     Type:  Fluid collection    Location:  Lower extremity    Lower extremity location: L thigh  Pre-procedure details:     Skin preparation:  Chloraprep  Anesthesia (see MAR for exact dosages): Anesthesia method:  Local infiltration    Local anesthetic:  Lidocaine 1% w/o epi  Procedure details:     Complexity:  Simple    Needle aspiration: no      Incision types:  Stab incision    Scalpel blade:  11    Approach:  Open    Incision depth:  Subcutaneous    Wound management:  Probed and deloculated    Drainage:  Serous    Drainage amount: Moderate    Wound treatment:  Wound left open and packing placed  Comments:      Ultrasound used to find area of concern seen on CT  I&D performed, serous drainage only, no purulence  Confirmed cavity collapsed on post U/S  Packing placed

## 2021-05-26 NOTE — EMTALA/ACUTE CARE TRANSFER
[address] [facility] [address]  [place] [address]  Dept: [phone]      EMTALA TRANSFER CONSENT    NAME Bozena Noriega                                         1980                              MRN [id number]    I have been informed of my rights regarding examination, treatment, and transfer   by Dr Naty Gandhi MD    Benefits: Specialized equipment and/or services available at the receiving facility (Include comment)________________________, Continuity of care, Patient preference    Risks: Potential for delay in receiving treatment      Consent for Transfer:  I acknowledge that my medical condition has been evaluated and explained to me by the emergency department physician or other qualified medical person and/or my attending physician, who has recommended that I be transferred to the service of  Accepting Physician: Dr Julissa Ontiveros at [address] [facility] Name, [address] : [facility]  The above potential benefits of such transfer, the potential risks associated with such transfer, and the probable risks of not being transferred have been explained to me, and I fully understand them  The doctor has explained that, in my case, the benefits of transfer outweigh the risks  I agree to be transferred  I authorize the performance of emergency medical procedures and treatments upon me in both transit and upon arrival at the receiving facility  Additionally, I authorize the release of any and all medical records to the receiving facility and request they be transported with me, if possible  I understand that the safest mode of transportation during a medical emergency is an ambulance and that the Hospital advocates the use of this mode of transport   Risks of traveling to the receiving facility by car, including absence of medical control, life sustaining equipment, such as oxygen, and medical personnel has been explained to me and I fully understand them     ([facility])  [  ]  I consent to the stated transfer and to be transported by ambulance/helicopter  [  ]  I consent to the stated transfer, but refuse transportation by ambulance and accept full responsibility for my transportation by car  I understand the risks of non-ambulance transfers and I exonerate the Hospital and its staff from any deterioration in my condition that results from this refusal     X___________________________________________    DATE  21  TIME________  Signature of patient or legally responsible individual signing on patient behalf           RELATIONSHIP TO PATIENT_________________________          Provider Certification    NAME Zeyad Allen                                         1980                              MRN [id number]    A medical screening exam was performed on the above named patient  Based on the examination:    Condition Necessitating Transfer The primary encounter diagnosis was Leg abscess  Diagnoses of Sepsis ([facility] [address] ) and CLOVIS (acute kidney injury) ([facility] [address] ) were also pertinent to this visit      Patient Condition: The patient has been stabilized such that within reasonable medical probability, no material deterioration of the patient condition or the condition of the unborn child(francesca) is likely to result from the transfer    Reason for Transfer: Level of Care needed not available at this facility    Transfer Requirements: [address]   · Space available and qualified personnel available for treatment as acknowledged by    · Agreed to accept transfer and to provide appropriate medical treatment as acknowledged by       Dr Ramonita Saravia  · Appropriate medical records of the examination and treatment of the patient are provided at the time of transfer   [address] _______  · Transfer will be performed by qualified personnel from    and appropriate transfer equipment as required, including the use of necessary and appropriate life support measures  Provider Certification: I have examined the patient and explained the following risks and benefits of being transferred/refusing transfer to the patient/family:  General risk, such as traffic hazards, adverse weather conditions, rough terrain or turbulence, possible failure of equipment (including vehicle or aircraft), or consequences of actions of persons outside the control of the transport personnel      Based on these reasonable risks and benefits to the patient and/or the unborn child(francesca), and based upon the information available at the time of the patients examination, I certify that the medical benefits reasonably to be expected from the provision of appropriate medical treatments at another medical facility outweigh the increasing risks, if any, to the individuals medical condition, and in the case of labor to the unborn child, from effecting the transfer      X____________________________________________ DATE 05/26/21        TIME_______      ORIGINAL - SEND TO MEDICAL RECORDS   COPY - SEND WITH PATIENT DURING TRANSFER

## 2021-05-26 NOTE — ANESTHESIA PROCEDURE NOTES
Arterial Line Insertion  Performed by: Meghana Caldwell CRNA  Authorized by: Briana Vaughan MD   Preparation: Patient was prepped and draped in the usual sterile fashion    Indications: multiple ABGs and hemodynamic monitoring  Orientation:  Left  Location: radial artery  Procedure Details:  Needle gauge: 20  Seldinger technique: Seldinger technique used  Number of attempts: 1    Post-procedure:  Post-procedure: dressing applied  Waveform: good waveform and waveform confirmed  Post-procedure CNS: normal and unchanged  Patient tolerance: Patient tolerated the procedure well with no immediate complications

## 2021-05-26 NOTE — ANESTHESIA POSTPROCEDURE EVALUATION
Post-Op Assessment Note    CV Status:  Stable  Pain Score: 0    Pain management: adequate  Multimodal analgesia used between 6 hours prior to anesthesia start to PACU discharge    Mental Status:  Sleepy and arousable   Hydration Status:  Euvolemic   PONV Controlled:  Controlled   Airway Patency:  Patent  Airway: intubated      Post Op Vitals Reviewed: Yes      Staff: Anesthesiologist, CRNA         No complications documented      BP (!) 123/49 (05/26/21 1949)    Temp 99 6 °F (37 6 °C) (05/26/21 1949)    Pulse 92 (05/26/21 1949)   Resp (!) 28 (05/26/21 1949)    SpO2 97 % (05/26/21 1949)

## 2021-05-26 NOTE — ED PROVIDER NOTES
History  Chief Complaint   Patient presents with    Fever - 9 weeks to 74 years     Patient has had a fever since Monday, Had surgery on left upper leg on April 6, area continues to drain upper left leg is red, Patient did see the surgeon on Monday for a recheck, also c/o nausea and vomiting     42-year-old male history of hypertension, diabetes, hidradenitis suppurativa, MI presents with fever x1 week  Patient had a left groin mass excised about 7 weeks ago by general surgery  He states that since then he was doing okay until this week  On Monday he started to feel unwell  He saw his general surgeon for follow-up on Monday, was noted to have a fever of 100 8, was reassured that wound looked well, was advised to get a COVID test   He tested negative for COVID the same day  He started to notice increasingly worse left leg pain, especially at site of excision along with fever and chills  Today he had nausea and one episode of vomiting  He states he feels dizzy and lightheaded  He states that his visiting nurse came today, T-max 105 4° so he was sent directly to the ER  He is not taking any Tylenol or Motrin  He denies any ear pain, sore throat, cold symptoms, chest pain, difficulty breathing, shortness of breath  No problems with urination  No penile or testicular pain  No headache  No syncope  No diarrhea or constipation  Prior to Admission Medications   Prescriptions Last Dose Informant Patient Reported? Taking?    Omega-3 Fatty Acids (fish oil) 1,000 mg   No No   Sig: Take 1 capsule (1,000 mg total) by mouth 2 (two) times a day   Ticagrelor (BRILINTA PO)   Yes No   Sig: Take by mouth   acetaminophen (TYLENOL) 325 mg tablet   No No   Sig: Take 3 tablets (975 mg total) by mouth every 8 (eight) hours   amLODIPine (NORVASC) 10 mg tablet   No No   Sig: Take 1 tablet (10 mg total) by mouth daily   aspirin (ECOTRIN LOW STRENGTH) 81 mg EC tablet   No No   Sig: Take 1 tablet (81 mg total) by mouth daily   atorvastatin (LIPITOR) 80 mg tablet   No No   Sig: Take 1 tablet (80 mg total) by mouth daily   carvedilol (COREG) 6 25 mg tablet   No No   Sig: Take 1 tablet (6 25 mg total) by mouth 2 (two) times a day with meals   gabapentin (NEURONTIN) 300 mg capsule   No No   Sig: Take 1 capsule (300 mg total) by mouth 3 (three) times a day for 14 days   Patient not taking: Reported on 5/10/2021   glimepiride (AMARYL) 4 mg tablet   No No   Sig: Take 1 tablet (4 mg total) by mouth daily with breakfast   lisinopril (ZESTRIL) 10 mg tablet   No No   Sig: Take 1 tablet (10 mg total) by mouth daily   metFORMIN (GLUCOPHAGE) 1000 MG tablet   No No   Sig: Take 1 tablet (1,000 mg total) by mouth 2 (two) times a day with meals   methocarbamol (ROBAXIN) 750 mg tablet   No No   Sig: Take 1 tablet (750 mg total) by mouth every 6 (six) hours   Patient not taking: Reported on 5/10/2021      Facility-Administered Medications: None       Past Medical History:   Diagnosis Date    Coronary artery disease     Diabetes mellitus ([facility] [address] )     Heart disease     CAD   s/p ptca with 1 stent 2012    Hidradenitis suppurativa     groin    Hyperlipidemia     Hypertension     MI (myocardial infarction) ([facility] [address] )     " silent " M I  in the past    Morbid obesity with BMI of 50 0-59 9, adult (HCC)     Myocardial infarction ([facility] [address] )     Nicotine dependence     Obesity     Pilonidal cyst        Past Surgical History:   Procedure Laterality Date    CORONARY ANGIOPLASTY WITH STENT PLACEMENT      INCISION AND DRAINAGE OF WOUND N/A 1/24/2017    Procedure: INCISION AND DRAINAGE (I&D) BUTTOCK, PILONIDAL CYST;  Surgeon: Sherley Mansfield MD;  Location: [address] [place] [address];  Service:    Nba Nelson LEG SURGERY Left     I&D of left leg 2012    GA EXC SKIN BENIG >4 CM TRUNK,ARM,LEG Left 4/6/2021    Procedure: EXCISION THIGH MASS;  Surgeon: Ariela Jones MD;  Location:  MAIN OR;  Service: General    GA NEG PRESS WOUND TX, < 50 CM Left 4/6/2021    Procedure: APPLICATION VAC DRESSING THIGH;  Surgeon: Elza Gonsales MD;  [facility];  Service: General    WOUND DEBRIDEMENT Left 4/17/2021    Procedure: DEBRIDEMENT WOUND AND DRESSING CHANGE [facility] OUT); Surgeon: Chrissie Schroeder DO;  [facility];  Service: General    WOUND DEBRIDEMENT Left 4/22/2021    Procedure: DEBRIDEMENT LOWER EXTREMITY [facility] OUT), left groin and thigh;  Surgeon: Elza Gonsales MD;  [facility];  Service: General       Family History   Problem Relation Age of Onset    Heart disease Father      I have reviewed and agree with the history as documented  E-Cigarette/Vaping    E-Cigarette Use Never User      E-Cigarette/Vaping Substances    Nicotine No     THC No     CBD No     Flavoring No      Social History     Tobacco Use    Smoking status: Former Smoker     Packs/day: 1 00     Years: 20 00     Pack years: 20 00     Types: Cigarettes    Smokeless tobacco: Never Used   Substance Use Topics    Alcohol use: Not Currently     Comment: rarely    Drug use: No       Review of Systems   Constitutional: Positive for chills and fever  HENT: Negative for sneezing and sore throat  Respiratory: Negative for cough and shortness of breath  Cardiovascular: Negative for chest pain, palpitations and leg swelling  Gastrointestinal: Positive for abdominal pain, nausea and vomiting  Negative for constipation and diarrhea  Genitourinary: Negative for decreased urine volume, difficulty urinating, discharge, dysuria, enuresis, flank pain, frequency, genital sores, hematuria, penile pain, penile swelling, scrotal swelling, testicular pain and urgency  Musculoskeletal: Positive for myalgias  Negative for back pain, gait problem and joint swelling  Skin: Positive for wound  Negative for color change, pallor and rash  Neurological: Negative for dizziness, syncope, weakness, light-headedness, numbness and headaches  All other systems reviewed and are negative        Physical Exam  Physical Exam  Vitals signs and nursing note reviewed  Constitutional:       General: He is not in acute distress  Appearance: Normal appearance  He is well-developed and normal weight  He is not diaphoretic  HENT:      Head: Normocephalic and atraumatic  Nose: Nose normal    Eyes:      Extraocular Movements: Extraocular movements intact  Conjunctiva/sclera: Conjunctivae normal    Neck:      Musculoskeletal: Normal range of motion and neck supple  Cardiovascular:      Rate and Rhythm: Normal rate and regular rhythm  Pulses: Normal pulses  Heart sounds: Normal heart sounds  No murmur  No friction rub  No gallop  Pulmonary:      Effort: Pulmonary effort is normal  No respiratory distress  Breath sounds: Normal breath sounds  No stridor  No wheezing or rales  Comments: Sp02 is 95% indicating adequate oxygenation on room air  Abdominal:      General: Abdomen is flat  Bowel sounds are normal  There is no distension  Palpations: Abdomen is soft  Tenderness: There is abdominal tenderness in the right lower quadrant  There is no right CVA tenderness, left CVA tenderness, guarding or rebound  Comments: No peritoneal signs   Musculoskeletal: Normal range of motion  Skin:     General: Skin is warm and dry  Capillary Refill: Capillary refill takes less than 2 seconds  Coloration: Skin is not pale  Findings: Erythema and wound present  No rash  Neurological:      Mental Status: He is alert  Mental status is at baseline                   Vital Signs  ED Triage Vitals [05/26/21 0924]   Temperature Pulse Respirations Blood Pressure SpO2   (!) 103 3 °F (39 6 °C) (!) 108 20 118/64 95 %      Temp Source Heart Rate Source Patient Position - Orthostatic VS BP Location FiO2 (%)   Tympanic Monitor Lying Right arm --      Pain Score       Worst Possible Pain           Vitals:    05/26/21 1006 05/26/21 1053 05/26/21 1115 05/26/21 1215   BP: 115/64 105/57 119/59 111/56   Pulse: 97 88 84 82   Patient Position - Orthostatic VS: Lying  Lying Lying         Visual Acuity      ED Medications  Medications   vancomycin (VANCOCIN) 1500 mg in sodium chloride 0 9% 250 mL IVPB (1,500 mg Intravenous New Bag 5/26/21 1218)   iohexol (OMNIPAQUE) 350 MG/ML injection (SINGLE-DOSE) 100 mL (has no administration in time range)   sodium chloride 0 9 % bolus 1,000 mL (0 mL Intravenous Stopped 5/26/21 1052)   morphine (PF) 4 mg/mL injection 4 mg (4 mg Intravenous Given 5/26/21 0958)   acetaminophen (TYLENOL) tablet 975 mg (975 mg Oral Given 5/26/21 0953)   ketorolac (TORADOL) injection 30 mg (30 mg Intravenous Given 5/26/21 0954)   ondansetron (ZOFRAN) injection 4 mg (4 mg Intravenous Given 5/26/21 0958)   iohexol (OMNIPAQUE) 350 MG/ML injection (SINGLE-DOSE) 100 mL (100 mL Intravenous Given 5/26/21 1022)   aztreonam (AZACTAM) 2,000 mg in sodium chloride 0 9 % 100 mL IVPB (0 mg Intravenous Stopped 5/26/21 1215)   metroNIDAZOLE (FLAGYL) IVPB (premix) 500 mg 100 mL (0 mg Intravenous Stopped 5/26/21 1119)   sodium chloride 0 9 % bolus 1,000 mL (0 mL Intravenous Stopped 5/26/21 1222)   HYDROmorphone (DILAUDID) injection 1 mg (1 mg Intravenous Given 5/26/21 1125)       Diagnostic Studies  Results Reviewed     Procedure Component Value Units Date/Time    Novel Coronavirus (Covid-19),PCR [facility] - 2 Hour Stat [[id number]]  (Normal) Collected: 05/26/21 0952    Lab Status: Final result Specimen: Nares from Nasopharyngeal Swab Updated: 05/26/21 1058     SARS-CoV-2 Negative    Narrative: The specimen collection materials, transport medium, and/or testing methodology utilized in the production of these test results have been proven to be reliable in a limited validation with an abbreviated program under the Emergency Utilization Authorization provided by the FDA  Testing reported as "Presumptive positive" will be confirmed with secondary testing to ensure result accuracy    Clinical caution and judgement should be used with the interpretation of these results with consideration of the clinical impression and other laboratory testing  Testing reported as "Positive" or "Negative" has been proven to be accurate according to standard laboratory validation requirements  All testing is performed with control materials showing appropriate reactivity at standard intervals  Troponin I [[id number]]  (Normal) Collected: 05/26/21 0942    Lab Status: Final result Specimen: Blood from Arm, Right Updated: 05/26/21 1015     Troponin I <0 02 ng/mL     Lactic acid [[id number]]  (Normal) Collected: 05/26/21 0942    Lab Status: Final result Specimen: Blood from Arm, Right Updated: 05/26/21 1014     LACTIC ACID 1 3 mmol/L     Narrative:      Result may be elevated if tourniquet was used during collection      Comprehensive metabolic panel [[id number]]  (Abnormal) Collected: 05/26/21 0942    Lab Status: Final result Specimen: Blood from Arm, Right Updated: 05/26/21 1011     Sodium 131 mmol/L      Potassium 3 9 mmol/L      Chloride 95 mmol/L      CO2 26 mmol/L      ANION GAP 10 mmol/L      BUN 15 mg/dL      Creatinine 1 52 mg/dL      Glucose 175 mg/dL      Calcium 8 3 mg/dL      Corrected Calcium 9 6 mg/dL      AST 21 U/L      ALT 22 U/L      Alkaline Phosphatase 99 U/L      Total Protein 7 8 g/dL      Albumin 2 4 g/dL      Total Bilirubin 0 42 mg/dL      eGFR 56 ml/min/1 73sq m     Narrative:      Maria Fernanda guidelines for Chronic Kidney Disease (CKD):     Stage 1 with normal or high GFR (GFR > 90 mL/min/1 73 square meters)    Stage 2 Mild CKD (GFR = 60-89 mL/min/1 73 square meters)    Stage 3A Moderate CKD (GFR = 45-59 mL/min/1 73 square meters)    Stage 3B Moderate CKD (GFR = 30-44 mL/min/1 73 square meters)    Stage 4 Severe CKD (GFR = 15-29 mL/min/1 73 square meters)    Stage 5 End Stage CKD (GFR <15 mL/min/1 73 square meters)  Note: GFR calculation is accurate only with a steady state creatinine    Protime-INR [[id number]]  (Abnormal) Collected: 05/26/21 0942    Lab Status: Final result Specimen: Blood from Arm, Right Updated: 05/26/21 1010     Protime 15 1 seconds      INR 1 20    APTT [[id number]]  (Normal) Collected: 05/26/21 0942    Lab Status: Final result Specimen: Blood from Arm, Right Updated: 05/26/21 1010     PTT 36 seconds     CBC and differential [[id number]]  (Abnormal) Collected: 05/26/21 0942    Lab Status: Final result Specimen: Blood from Arm, Right Updated: 05/26/21 0953     WBC 15 20 Thousand/uL      RBC 3 62 Million/uL      Hemoglobin 9 1 g/dL      Hematocrit 29 1 %      MCV 80 fL      MCH 25 1 pg      MCHC 31 3 g/dL      RDW 14 0 %      MPV 9 1 fL      Platelets 805 Thousands/uL      nRBC 0 /100 WBCs      Neutrophils Relative 87 %      Immat GRANS % 1 %      Lymphocytes Relative 7 %      Monocytes Relative 5 %      Eosinophils Relative 0 %      Basophils Relative 0 %      Neutrophils Absolute 13 17 Thousands/µL      Immature Grans Absolute 0 20 Thousand/uL      Lymphocytes Absolute 0 99 Thousands/µL      Monocytes Absolute 0 79 Thousand/µL      Eosinophils Absolute 0 00 Thousand/µL      Basophils Absolute 0 05 Thousands/µL     Blood culture #2 [[id number]] Collected: 05/26/21 0942    Lab Status: In process Specimen: Blood from Arm, Right Updated: 05/26/21 0950    Blood culture #1 [[id number]] Collected: 05/26/21 0935    Lab Status: In process Specimen: Blood from Arm, Right Updated: 05/26/21 0950    UA w Reflex to Microscopic w Reflex to Culture [[id number]]     Lab Status: No result Specimen: Urine                  CT abdomen pelvis with contrast   Final Result by Sandra Amezquita MD (05/26 1115)      1  Subcutaneous abscess within the left anterior upper thigh underlying superficial wound with reactive inguinal and external iliac lymphadenopathy        2   Previous subcutaneous abscess within the pannus has resolved though there is continued diffuse skin thickening with more focal thickening/collection within the skin of the pannus on the right  This has been present since November 2020 and is not    significantly changed in size  The study was marked in [facility] for immediate notification  Workstation performed: [id number]         XR chest 1 view portable   Final Result by Ernie Prajapati MD (05/26 1144)      No acute cardiopulmonary disease  Workstation performed: [id number]                    Procedures  ECG 12 Lead Documentation Only    Date/Time: 5/26/2021 9:40 AM  Performed by: Paradise Camara PA-C  Authorized by: Paradise Camara PA-C     Indications / Diagnosis:  Sepsis  Patient location:  ED  Interpretation:     Interpretation: normal    Rate:     ECG rate:  106    ECG rate assessment: tachycardic    Rhythm:     Rhythm: sinus tachycardia    Ectopy:     Ectopy: none    QRS:     QRS axis:  Normal    QRS intervals:  Normal  Conduction:     Conduction: normal    ST segments:     ST segments:  Normal  T waves:     T waves: normal               ED Course  ED Course as of May 26 1243   Wed May 26, 2021   0954 WBC(!): 15 20   0954 Improved from baseline 8   Hemoglobin(!): 9 1   1011 CLOVIS - 0 5 increase from baseline Cr <1 0   Creatinine(!): 1 52   1022 Sepsis alert called, allergy to amoxicillin hives, states he has breathing difficulty with it  Has never had cephalosporin that he knows of  Will cover with aztreonam/flagyl/vanco as source is questionable abdominal vs leg wound      1120 Spoke with Dr Avis Jordan who reviewed CT scan, advised to contact primary surgeon about abscess and if patient needs to be transferred otherwise can be admitted here for surgery  [address] referral to discuss case w/general surgery team at [facility] if patient should be transferred for continuity of care or can remain at [facility] for management  [facility] w/Dr Yuniel Hunter who states patient can be transferred to [facility] under general surgery service unless patient prefers [facility]   Spoke with patient who prefers to be transferred to [facility]  Initial Sepsis Screening     [facility] Name 05/26/21 1016                Is the patient's history suggestive of a new or worsening infection?  --        Suspected source of infection  acute abdominal infection;wound infection  -YP        Are two or more of the following signs & symptoms of infection both present and new to the patient? (!) Yes (Proceed)  -YP        Indicate SIRS criteria  Leukocytosis (WBC > 26040 IJL); Tachycardia > 90 bpm;Hyperthemia > 38 3C (100 9F)  -YP        If the answer is yes to both questions, suspicion of sepsis is present  --        If severe sepsis is present AND tissue hypoperfusion perists in the hour after fluid resuscitation or lactate > 4, the patient meets criteria for SEPTIC SHOCK  --        Are any of the following organ dysfunction criteria present within 6 hours of suspected infection and SIRS criteria that are NOT considered to be chronic conditions?   (!) Yes  -YP        Organ dysfunction  Creatinine > 0 5 mg/dl ABOVE BASELINE  -YP        Date of presentation of severe sepsis  --        Time of presentation of severe sepsis  --        Tissue hypoperfusion persists in the hour after crystalloid fluid administration, evidenced, by either:  --        Was hypotension present within one hour of the conclusion of crystalloid fluid administration?  --        Date of presentation of septic shock  --        Time of presentation of septic shock  --          User Key  (r) = Recorded By, (t) = Taken By, (c) = Cosigned By    Initials Name Provider Type    YP Marcia Ortiz PA-C Physician Assistant           Default Flowsheet Data (last 720 hours)      Sepsis [address] [place] [address] Name 05/26/21 1225                   Repeat Volume Status and Tissue Perfusion Assessment Performed    Repeat Volume Status and Tissue Perfusion Assessment Performed  Yes  -YP           Volume Status and Tissue Perfusion Post Fluid Resuscitation * Must Document All *    Vital Signs Reviewed (HR, RR, BP, T)  Yes  -YP        Shock Index Reviewed  Yes  -YP        Arterial Oxygen Saturation Reviewed (POx, SaO2 or SpO2)  Yes (comment %) 95%  -YP        Cardio  Normal S1/S2; Regular rate and rhythm; No murmor; No rub or gallop  -YP        Pulmonary  Normal effort  -YP        Capillary Refill  Brisk  -YP        Peripheral Pulses  --        Peripheral Pulse  --        Skin  Warm  -YP        Urine output assessed  --           *OR*   Intensive Monitoring- Must Document One of the Following Four *:    Vital Signs Reviewed  --        * Central Venous Pressure (CVP or RAP)  --        * Central Venous Oxygen (SVO2, ScvO2 or Oxygen saturation via central catheter)  --        * Bedside Cardiovascular US in IVC diameter and % collapse  --        * Passive Leg Raise OR Crystalloid Challenge  --          User Key  (r) = Recorded By, (t) = Taken By, (c) = Cosigned By    Initials Name Provider Type    CASSIDY Seymour PA-C Physician Assistant                      MDM  Number of Diagnoses or Management Options  CLOVIS (acute kidney injury) [facility]):   Leg abscess:   Sepsis [facility]):   Diagnosis management comments: Patient to be transferred to [facility] under general surgery for sepsis/leg abscess/clovis       Amount and/or Complexity of Data Reviewed  Clinical lab tests: ordered and reviewed  Tests in the radiology section of CPT®: ordered and reviewed  Tests in the medicine section of CPT®: reviewed and ordered  Discussion of test results with the performing providers: yes  Review and summarize past medical records: yes  Discuss the patient with other providers: yes (Discussed case w/Dr Surekha Aleman, Dr Raffaele Cole)  Independent visualization of images, tracings, or specimens: yes        Disposition  Final diagnoses:   Leg abscess   Sepsis ([facility] [address] )   CLOVIS (acute kidney injury) ([facility] [address] )     Time reflects when diagnosis was documented in both MDM as applicable and the Disposition within this note     Time User Action Codes Description Comment    5/26/2021 11:53 AM Pearla Left Add [P44 420] Leg abscess     5/26/2021 11:53 AM Pearla Left Add [A41 9] Sepsis ([address] )     5/26/2021 11:53 AM Pearla Left Add [N17 9] CLOVIS (acute kidney injury) [facility])       ED Disposition     ED Disposition Condition Date/Time Comment    Transfer to Another Facility-In Network  Wed May 26, 2021 11:52 AM Marlyn Thao should be transferred out to [facility]        MD Documentation      Most Recent Value   Patient Condition  The patient has been stabilized such that within reasonable medical probability, no material deterioration of the patient condition or the condition of the unborn child(francesca) is likely to result from the transfer   Reason for Transfer  Level of Care needed not available at this facility   Benefits of Transfer  Specialized equipment and/or services available at the receiving facility (Include comment)________________________, Continuity of care, Patient preference   Risks of Transfer  Potential for delay in receiving treatment   Accepting Physician  Dr Rehman Route Name, [address] [facility] MD Dr Matteo Batista   Provider Certification  General risk, such as traffic hazards, adverse weather conditions, rough terrain or turbulence, possible failure of equipment (including vehicle or aircraft), or consequences of actions of persons outside the control of the transport personnel      RN Documentation      Most [address] [facility] [address] Name, [address] [facility]   Bed Assignment  ED   Report Given to  [place] RN   Medications Reviewed with Next Provider of Service  Yes   Transport Mode  Ambulance   Level of Care  Advanced life support   Patient Belongings Disposition  Sent with patient   Transfer Date  05/26/21   Transfer Time  1300      Follow-up Information    None         Patient's Medications   Discharge Prescriptions    No medications on file     No discharge procedures on file      PDMP Review       Value Time User    PDMP Reviewed  Yes 4/29/2021 10:52 AM Dejuan Bailey PA-C          ED Provider  Electronically Signed by           Negro Husain PA-C  05/26/21 [phone]

## 2021-05-26 NOTE — ED NOTES
Verbal order from Dr Roque Mata   1 L of LR hung at this time via pressure bag      [place], [place]  05/26/21 0250

## 2021-05-26 NOTE — ED NOTES
Transport arrives at bedside  Verbal report given to Zelda Sen RN at bedside  Cell phone and  sent with the pt        Elizabeth Grimm RN  05/26/21 8404

## 2021-05-26 NOTE — H&P
Acute Care Surgery  History and Physical  Royal Rudolph 37 y o  male MRN: [id number]  Unit/Bed#: ED 01 Encounter: [id number]    Assessment and Plan:  1  Cellulitis on left lower extremity   - continue antibiotics; Ancef and Flagyl   - trend labs   - monitor fever curve   - cellulitic area outlined a marker   - monitor closely    2  Abscess versus seroma   - abscess versus seroma on left lower extremity   - I&D at bedside   - see procedure note   - packing placed   - will need daily checks    3  History of surgical debridement of hidradenitis   - continue local wound care   - Maxorb and ABD   -change daily and or as needed    4  History of hypertension   - restart carvedilol, and amlodipine   - hold lisinopril at this time   - outpatient follow-up with PCP    5  History of diabetes type 2   - start sliding scale   - trend sugars    DVT prophylaxis:  SCDs and subcutaneous heparin  PT and OT:  Eval and treat    Disposition:  Admit to Surgical team   Step-down level 2  Continue close observation on antibiotics  Monitor for any worsening swelling and or pain  Insert cellulitic area line marker  History of Present Illness   HPI:  Royal Rudolph is a 37 y o  male who presents with worsening left lower extremity pain in worsening fevers  Patient states that he is overall not feeling well today and he subsequently came in over a course of 3 days and noticing that his pain was worse in his left lower extremity as well as having worsening fevers  Patient complains of pain just above his knee  Reports no episodes of nausea or vomiting  No chest pain or shortness of breath  No new numbness or tingling in extremity  Able to ambulate  Review of Systems   Constitutional: Negative for activity change, appetite change and fever  HENT: Negative for ear discharge, ear pain, rhinorrhea, sore throat and trouble swallowing  Eyes: Negative for photophobia, pain and redness     Respiratory: Negative for apnea, cough, chest tightness, shortness of breath and stridor  Cardiovascular: Negative for chest pain and palpitations  Gastrointestinal: Negative for abdominal distention, abdominal pain, nausea and vomiting  Endocrine: Negative for cold intolerance and heat intolerance  Genitourinary: Negative  Musculoskeletal: Positive for joint swelling and myalgias  Negative for arthralgias, back pain, neck pain and neck stiffness  Skin: Positive for wound  Neurological: Negative for dizziness, weakness, light-headedness and numbness  Hematological: Negative  Historical Information   Past Medical History:   Diagnosis Date    Coronary artery disease     Diabetes mellitus ([facility] )     Heart disease     CAD   s/p ptca with 1 stent 2012    Hidradenitis suppurativa     groin    Hyperlipidemia     Hypertension     MI (myocardial infarction) ([facility] )     " silent " M I  in the past    Morbid obesity with BMI of 50 0-59 9, adult ([place] [address] )     Myocardial infarction ([place] [address] )     Nicotine dependence     Obesity     Pilonidal cyst      Past Surgical History:   Procedure Laterality Date    CORONARY ANGIOPLASTY WITH STENT PLACEMENT      INCISION AND DRAINAGE OF WOUND N/A 1/24/2017    Procedure: INCISION AND DRAINAGE (I&D) BUTTOCK, PILONIDAL CYST;  Surgeon: Lori Rudolph MD;  Location: [address] [place] [address];  Service:    Manuela Clunes LEG SURGERY Left     I&D of left leg 2012    HI EXC SKIN BENIG >4 CM TRUNK,ARM,LEG Left 4/6/2021    Procedure: EXCISION THIGH MASS;  Surgeon: Fletcher Mendez MD;  [facility];  Service: General    HI NEG PRESS WOUND [address], < 50 CM Left 4/6/2021    Procedure: APPLICATION VAC DRESSING THIGH;  Surgeon: Fletcher Mendez MD;  [facility];  Service: General    WOUND DEBRIDEMENT Left 4/17/2021    Procedure: DEBRIDEMENT WOUND AND DRESSING CHANGE ([address] OUT);   Surgeon: Nicolasa Richard DO;  [facility];  Service: General    WOUND DEBRIDEMENT Left 4/22/2021    Procedure: DEBRIDEMENT LOWER EXTREMITY [facility] OUT), left groin and thigh;  Surgeon: Dalia Jeong MD;  [facility];  Service: General     Social History   Social History     Substance and Sexual Activity   Alcohol Use Not Currently    Comment: rarely     Social History     Substance and Sexual Activity   Drug Use No     Social History     Tobacco Use   Smoking Status Former Smoker    Packs/day: 1 00    Years: 20 00    Pack years: 20 00    Types: Cigarettes   Smokeless Tobacco Never Used     Family History:   Family History   Problem Relation Age of Onset    Heart disease Father        Meds/Allergies   PTA meds:   Prior to Admission Medications   Prescriptions Last Dose Informant Patient Reported? Taking?    Omega-3 Fatty Acids (fish oil) 1,000 mg   No No   Sig: Take 1 capsule (1,000 mg total) by mouth 2 (two) times a day   Ticagrelor (BRILINTA PO)   Yes No   Sig: Take by mouth   acetaminophen (TYLENOL) 325 mg tablet   No No   Sig: Take 3 tablets (975 mg total) by mouth every 8 (eight) hours   amLODIPine (NORVASC) 10 mg tablet   No No   Sig: Take 1 tablet (10 mg total) by mouth daily   aspirin (ECOTRIN LOW STRENGTH) 81 mg EC tablet   No No   Sig: Take 1 tablet (81 mg total) by mouth daily   atorvastatin (LIPITOR) 80 mg tablet   No No   Sig: Take 1 tablet (80 mg total) by mouth daily   carvedilol (COREG) 6 25 mg tablet   No No   Sig: Take 1 tablet (6 25 mg total) by mouth 2 (two) times a day with meals   gabapentin (NEURONTIN) 300 mg capsule   No No   Sig: Take 1 capsule (300 mg total) by mouth 3 (three) times a day for 14 days   Patient not taking: Reported on 5/10/2021   glimepiride (AMARYL) 4 mg tablet   No No   Sig: Take 1 tablet (4 mg total) by mouth daily with breakfast   lisinopril (ZESTRIL) 10 mg tablet   No No   Sig: Take 1 tablet (10 mg total) by mouth daily   metFORMIN (GLUCOPHAGE) 1000 MG tablet   No No   Sig: Take 1 tablet (1,000 mg total) by mouth 2 (two) times a day with meals   methocarbamol (ROBAXIN) 750 mg tablet   No No   Sig: Take 1 tablet (750 mg total) by mouth every 6 (six) hours   Patient not taking: Reported on 5/10/2021      Facility-Administered Medications: None     Allergies   Allergen Reactions    Amoxicillin Hives       Objective   First Vitals:   Blood Pressure: (!) 111/42 (05/26/21 1352)  Pulse: 78 (05/26/21 1352)  Temperature: 100 °F (37 8 °C) (05/26/21 1352)  Respirations: 20 (05/26/21 1352)  SpO2: 96 % (05/26/21 1352)    Current Vitals:   Blood Pressure: (!) 111/42 (05/26/21 1352)  Pulse: 78 (05/26/21 1352)  Temperature: 100 °F (37 8 °C) (05/26/21 1352)  Respirations: 20 (05/26/21 1352)  SpO2: 96 % (05/26/21 1352)    No intake or output data in the 24 hours ending 05/26/21 1448    Invasive Devices     Peripheral Intravenous Line            Peripheral IV 05/26/21 Right Antecubital less than 1 day                Physical Exam  Vitals signs reviewed  Constitutional:       Appearance: Normal appearance  HENT:      Head: Normocephalic and atraumatic  Eyes:      Extraocular Movements: Extraocular movements intact  Neck:      Musculoskeletal: Normal range of motion and neck supple  Cardiovascular:      Rate and Rhythm: Normal rate and regular rhythm  Pulses: Normal pulses  Heart sounds: Normal heart sounds  Pulmonary:      Effort: Pulmonary effort is normal  No respiratory distress  Breath sounds: Normal breath sounds  Chest:      Chest wall: No tenderness  Abdominal:      General: There is no distension  Palpations: Abdomen is soft  Tenderness: There is no abdominal tenderness  Musculoskeletal: Normal range of motion  General: No tenderness  Skin:     Comments: Residual left lower extremity surgical wound  Wound appears clean, dry, intact  There is a noted area of cellulitis on the left medial lower extremity  This is just superior to the knee  There is no area of fluctuance  There is no obvious drainage    There is a separate area just inferior to the medial aspect of the wound where there is an I and D that was performed  This appeared to be nonpurulent material that drained from the wound  Patient also has an his pannus multiple areas of hidradenitis with minimal purulent drainage  Neurological:      General: No focal deficit present  Mental Status: He is alert and oriented to person, place, and time  Lab Results:   I have personally reviewed pertinent lab results  , CBC:   Lab Results   Component Value Date    WBC 15 20 (H) 05/26/2021    HGB 9 1 (L) 05/26/2021    HCT 29 1 (L) 05/26/2021    MCV 80 (L) 05/26/2021     05/26/2021    MCH 25 1 (L) 05/26/2021    MCHC 31 3 (L) 05/26/2021    RDW 14 0 05/26/2021    MPV 9 1 05/26/2021    NRBC 0 05/26/2021   , CMP:   Lab Results   Component Value Date    SODIUM 131 (L) 05/26/2021    K 3 9 05/26/2021    CL 95 (L) 05/26/2021    CO2 26 05/26/2021    BUN 15 05/26/2021    CREATININE 1 52 (H) 05/26/2021    CALCIUM 8 3 05/26/2021    AST 21 05/26/2021    ALT 22 05/26/2021    ALKPHOS 99 05/26/2021    EGFR 56 05/26/2021     Imaging: I have personally reviewed pertinent reports  EKG, Pathology, and Other Studies: I have personally reviewed pertinent reports  Code Status: Prior  Advance Directive and Living Will:      Power of :    POLST:      Counseling / Coordination of Care  Total floor / unit time spent today 33 minutes  This involved direct patient contact where I performed a full history and physical, reviewed previous records, and reviewed laboratory and other diagnostic studies  Greater than 50% of total time was spent with the patient and / or family counseling and / or coordination of care      Irish Small PA-C  5/26/2021

## 2021-05-27 ENCOUNTER — APPOINTMENT (INPATIENT)
Dept: NON INVASIVE DIAGNOSTICS | Facility: HOSPITAL | Age: 41
DRG: 853 | End: 2021-05-27
Payer: COMMERCIAL

## 2021-05-27 LAB
ANION GAP SERPL CALCULATED.3IONS-SCNC: 7 MMOL/L (ref 4–13)
ANION GAP SERPL CALCULATED.3IONS-SCNC: 9 MMOL/L (ref 4–13)
ARTERIAL PATENCY WRIST A: NO
BACTERIA UR QL AUTO: NORMAL /HPF
BASE EXCESS BLDA CALC-SCNC: -1 MMOL/L (ref -2–3)
BASE EXCESS BLDA CALC-SCNC: -4.1 MMOL/L
BASE EXCESS BLDA CALC-SCNC: -5.9 MMOL/L
BILIRUB UR QL STRIP: NEGATIVE
BUN SERPL-MCNC: 21 MG/DL (ref 5–25)
BUN SERPL-MCNC: 22 MG/DL (ref 5–25)
CA-I BLD-SCNC: 1 MMOL/L (ref 1.12–1.32)
CA-I BLD-SCNC: 1.12 MMOL/L (ref 1.12–1.32)
CALCIUM SERPL-MCNC: 7.4 MG/DL (ref 8.3–10.1)
CALCIUM SERPL-MCNC: 7.5 MG/DL (ref 8.3–10.1)
CHLORIDE SERPL-SCNC: 103 MMOL/L (ref 100–108)
CHLORIDE SERPL-SCNC: 104 MMOL/L (ref 100–108)
CLARITY UR: CLEAR
CO2 SERPL-SCNC: 23 MMOL/L (ref 21–32)
CO2 SERPL-SCNC: 24 MMOL/L (ref 21–32)
COLOR UR: ABNORMAL
CREAT SERPL-MCNC: 1.44 MG/DL (ref 0.6–1.3)
CREAT SERPL-MCNC: 1.59 MG/DL (ref 0.6–1.3)
ERYTHROCYTE [DISTWIDTH] IN BLOOD BY AUTOMATED COUNT: 14.3 % (ref 11.6–15.1)
ERYTHROCYTE [DISTWIDTH] IN BLOOD BY AUTOMATED COUNT: 14.4 % (ref 11.6–15.1)
ERYTHROCYTE [DISTWIDTH] IN BLOOD BY AUTOMATED COUNT: 15 % (ref 11.6–15.1)
GFR SERPL CREATININE-BSD FRML MDRD: 54 ML/MIN/1.73SQ M
GFR SERPL CREATININE-BSD FRML MDRD: 60 ML/MIN/1.73SQ M
GLUCOSE P FAST SERPL-MCNC: 124 MG/DL (ref 65–99)
GLUCOSE P FAST SERPL-MCNC: 144 MG/DL (ref 65–99)
GLUCOSE SERPL-MCNC: 124 MG/DL (ref 65–140)
GLUCOSE SERPL-MCNC: 135 MG/DL (ref 65–140)
GLUCOSE SERPL-MCNC: 144 MG/DL (ref 65–140)
GLUCOSE SERPL-MCNC: 150 MG/DL (ref 65–140)
GLUCOSE SERPL-MCNC: 151 MG/DL (ref 65–140)
GLUCOSE SERPL-MCNC: 202 MG/DL (ref 65–140)
GLUCOSE UR STRIP-MCNC: NEGATIVE MG/DL
HCO3 BLDA-SCNC: 19.6 MMOL/L (ref 22–28)
HCO3 BLDA-SCNC: 21.9 MMOL/L (ref 22–28)
HCO3 BLDA-SCNC: 24.9 MMOL/L (ref 24–30)
HCT VFR BLD AUTO: 25.2 % (ref 36.5–49.3)
HCT VFR BLD AUTO: 25.8 % (ref 36.5–49.3)
HCT VFR BLD AUTO: 26.2 % (ref 36.5–49.3)
HCT VFR BLD CALC: 27 % (ref 36.5–49.3)
HGB BLD-MCNC: 7.4 G/DL (ref 12–17)
HGB BLD-MCNC: 7.6 G/DL (ref 12–17)
HGB BLD-MCNC: 8 G/DL (ref 12–17)
HGB BLDA-MCNC: 9.2 G/DL (ref 12–17)
HGB UR QL STRIP.AUTO: ABNORMAL
KETONES UR STRIP-MCNC: ABNORMAL MG/DL
LACTATE SERPL-SCNC: 0.7 MMOL/L (ref 0.5–2)
LACTATE SERPL-SCNC: 0.8 MMOL/L (ref 0.5–2)
LEUKOCYTE ESTERASE UR QL STRIP: NEGATIVE
MAGNESIUM SERPL-MCNC: 1.9 MG/DL (ref 1.6–2.6)
MCH RBC QN AUTO: 24.3 PG (ref 26.8–34.3)
MCH RBC QN AUTO: 24.7 PG (ref 26.8–34.3)
MCH RBC QN AUTO: 25.9 PG (ref 26.8–34.3)
MCHC RBC AUTO-ENTMCNC: 29.4 G/DL (ref 31.4–37.4)
MCHC RBC AUTO-ENTMCNC: 29.5 G/DL (ref 31.4–37.4)
MCHC RBC AUTO-ENTMCNC: 30.5 G/DL (ref 31.4–37.4)
MCV RBC AUTO: 83 FL (ref 82–98)
MCV RBC AUTO: 84 FL (ref 82–98)
MCV RBC AUTO: 85 FL (ref 82–98)
NASAL CANNULA: 4
NITRITE UR QL STRIP: NEGATIVE
NON-SQ EPI CELLS URNS QL MICRO: NORMAL /HPF
NRBC BLD AUTO-RTO: 0 /100 WBCS
NRBC BLD AUTO-RTO: 0 /100 WBCS
O2 CT BLDA-SCNC: 11.1 ML/DL (ref 16–23)
O2 CT BLDA-SCNC: 11.4 ML/DL (ref 16–23)
OXYHGB MFR BLDA: 92 % (ref 94–97)
OXYHGB MFR BLDA: 96.1 % (ref 94–97)
PCO2 BLD: 26 MMOL/L (ref 21–32)
PCO2 BLD: 48.3 MM HG (ref 42–50)
PCO2 BLDA: 38.3 MM HG (ref 36–44)
PCO2 BLDA: 44.5 MM HG (ref 36–44)
PH BLD: 7.32 [PH] (ref 7.3–7.4)
PH BLDA: 7.31 [PH] (ref 7.35–7.45)
PH BLDA: 7.33 [PH] (ref 7.35–7.45)
PH UR STRIP.AUTO: 5 [PH]
PHOSPHATE SERPL-MCNC: 4.1 MG/DL (ref 2.7–4.5)
PLATELET # BLD AUTO: 286 THOUSANDS/UL (ref 149–390)
PLATELET # BLD AUTO: 310 THOUSANDS/UL (ref 149–390)
PLATELET # BLD AUTO: 336 THOUSANDS/UL (ref 149–390)
PMV BLD AUTO: 9.3 FL (ref 8.9–12.7)
PMV BLD AUTO: 9.4 FL (ref 8.9–12.7)
PMV BLD AUTO: 9.6 FL (ref 8.9–12.7)
PO2 BLD: 58 MM HG (ref 35–45)
PO2 BLDA: 100.5 MM HG (ref 75–129)
PO2 BLDA: 76.7 MM HG (ref 75–129)
POTASSIUM BLD-SCNC: 4.5 MMOL/L (ref 3.5–5.3)
POTASSIUM SERPL-SCNC: 4.2 MMOL/L (ref 3.5–5.3)
POTASSIUM SERPL-SCNC: 4.4 MMOL/L (ref 3.5–5.3)
PROT UR STRIP-MCNC: ABNORMAL MG/DL
RBC # BLD AUTO: 3.05 MILLION/UL (ref 3.88–5.62)
RBC # BLD AUTO: 3.08 MILLION/UL (ref 3.88–5.62)
RBC # BLD AUTO: 3.09 MILLION/UL (ref 3.88–5.62)
RBC #/AREA URNS AUTO: NORMAL /HPF
SAO2 % BLD FROM PO2: 87 % (ref 60–85)
SODIUM BLD-SCNC: 134 MMOL/L (ref 136–145)
SODIUM SERPL-SCNC: 135 MMOL/L (ref 136–145)
SODIUM SERPL-SCNC: 135 MMOL/L (ref 136–145)
SP GR UR STRIP.AUTO: >1.045 (ref 1–1.03)
SPECIMEN SOURCE: ABNORMAL
UROBILINOGEN UR QL STRIP.AUTO: 1 E.U./DL
WBC # BLD AUTO: 12.47 THOUSAND/UL (ref 4.31–10.16)
WBC # BLD AUTO: 14.81 THOUSAND/UL (ref 4.31–10.16)
WBC # BLD AUTO: 15.32 THOUSAND/UL (ref 4.31–10.16)
WBC #/AREA URNS AUTO: NORMAL /HPF

## 2021-05-27 PROCEDURE — 82805 BLOOD GASES W/O2 SATURATION: CPT | Performed by: EMERGENCY MEDICINE

## 2021-05-27 PROCEDURE — 85027 COMPLETE CBC AUTOMATED: CPT | Performed by: EMERGENCY MEDICINE

## 2021-05-27 PROCEDURE — 94002 VENT MGMT INPAT INIT DAY: CPT

## 2021-05-27 PROCEDURE — NC001 PR NO CHARGE: Performed by: SURGERY

## 2021-05-27 PROCEDURE — 94760 N-INVAS EAR/PLS OXIMETRY 1: CPT

## 2021-05-27 PROCEDURE — 84100 ASSAY OF PHOSPHORUS: CPT | Performed by: EMERGENCY MEDICINE

## 2021-05-27 PROCEDURE — 82948 REAGENT STRIP/BLOOD GLUCOSE: CPT

## 2021-05-27 PROCEDURE — 80048 BASIC METABOLIC PNL TOTAL CA: CPT | Performed by: EMERGENCY MEDICINE

## 2021-05-27 PROCEDURE — P9016 RBC LEUKOCYTES REDUCED: HCPCS

## 2021-05-27 PROCEDURE — 30233N1 TRANSFUSION OF NONAUTOLOGOUS RED BLOOD CELLS INTO PERIPHERAL VEIN, PERCUTANEOUS APPROACH: ICD-10-PCS | Performed by: SURGERY

## 2021-05-27 PROCEDURE — 83735 ASSAY OF MAGNESIUM: CPT | Performed by: EMERGENCY MEDICINE

## 2021-05-27 PROCEDURE — 83605 ASSAY OF LACTIC ACID: CPT | Performed by: EMERGENCY MEDICINE

## 2021-05-27 PROCEDURE — 81001 URINALYSIS AUTO W/SCOPE: CPT | Performed by: EMERGENCY MEDICINE

## 2021-05-27 PROCEDURE — 99291 CRITICAL CARE FIRST HOUR: CPT | Performed by: SURGERY

## 2021-05-27 PROCEDURE — 85027 COMPLETE CBC AUTOMATED: CPT | Performed by: PHYSICIAN ASSISTANT

## 2021-05-27 PROCEDURE — 93971 EXTREMITY STUDY: CPT

## 2021-05-27 PROCEDURE — 82330 ASSAY OF CALCIUM: CPT | Performed by: EMERGENCY MEDICINE

## 2021-05-27 RX ORDER — DEXTROSE, SODIUM CHLORIDE, AND POTASSIUM CHLORIDE 5; .45; .15 G/100ML; G/100ML; G/100ML
75 INJECTION INTRAVENOUS CONTINUOUS
Status: DISCONTINUED | OUTPATIENT
Start: 2021-05-28 | End: 2021-05-28

## 2021-05-27 RX ORDER — HYDROMORPHONE HCL/PF 1 MG/ML
1 SYRINGE (ML) INJECTION EVERY 2 HOUR PRN
Status: DISCONTINUED | OUTPATIENT
Start: 2021-05-27 | End: 2021-06-03

## 2021-05-27 RX ORDER — METHOCARBAMOL 500 MG/1
500 TABLET, FILM COATED ORAL EVERY 6 HOURS PRN
Status: DISCONTINUED | OUTPATIENT
Start: 2021-05-27 | End: 2021-05-27

## 2021-05-27 RX ORDER — GABAPENTIN 100 MG/1
100 CAPSULE ORAL 3 TIMES DAILY
Status: DISCONTINUED | OUTPATIENT
Start: 2021-05-27 | End: 2021-05-29

## 2021-05-27 RX ORDER — HYDROMORPHONE HYDROCHLORIDE 2 MG/1
2 TABLET ORAL EVERY 4 HOURS PRN
Status: DISCONTINUED | OUTPATIENT
Start: 2021-05-27 | End: 2021-06-03

## 2021-05-27 RX ORDER — DEXTROSE, SODIUM CHLORIDE, AND POTASSIUM CHLORIDE 5; .45; .15 G/100ML; G/100ML; G/100ML
100 INJECTION INTRAVENOUS CONTINUOUS
Status: DISCONTINUED | OUTPATIENT
Start: 2021-05-27 | End: 2021-05-27

## 2021-05-27 RX ORDER — HYDROMORPHONE HCL/PF 1 MG/ML
1 SYRINGE (ML) INJECTION
Status: DISCONTINUED | OUTPATIENT
Start: 2021-05-27 | End: 2021-05-27

## 2021-05-27 RX ORDER — HYDROMORPHONE HCL/PF 1 MG/ML
1 SYRINGE (ML) INJECTION ONCE
Status: COMPLETED | OUTPATIENT
Start: 2021-05-27 | End: 2021-05-27

## 2021-05-27 RX ORDER — CALCIUM GLUCONATE 20 MG/ML
1 INJECTION, SOLUTION INTRAVENOUS ONCE
Status: COMPLETED | OUTPATIENT
Start: 2021-05-27 | End: 2021-05-27

## 2021-05-27 RX ORDER — HYDROMORPHONE HYDROCHLORIDE 4 MG/1
4 TABLET ORAL EVERY 4 HOURS PRN
Status: DISCONTINUED | OUTPATIENT
Start: 2021-05-27 | End: 2021-06-03

## 2021-05-27 RX ORDER — METHOCARBAMOL 500 MG/1
500 TABLET, FILM COATED ORAL EVERY 6 HOURS SCHEDULED
Status: DISCONTINUED | OUTPATIENT
Start: 2021-05-27 | End: 2021-05-29

## 2021-05-27 RX ORDER — SODIUM CHLORIDE, SODIUM GLUCONATE, SODIUM ACETATE, POTASSIUM CHLORIDE, MAGNESIUM CHLORIDE, SODIUM PHOSPHATE, DIBASIC, AND POTASSIUM PHOSPHATE .53; .5; .37; .037; .03; .012; .00082 G/100ML; G/100ML; G/100ML; G/100ML; G/100ML; G/100ML; G/100ML
1000 INJECTION, SOLUTION INTRAVENOUS ONCE
Status: COMPLETED | OUTPATIENT
Start: 2021-05-27 | End: 2021-05-27

## 2021-05-27 RX ORDER — SODIUM CHLORIDE, SODIUM LACTATE, POTASSIUM CHLORIDE, CALCIUM CHLORIDE 600; 310; 30; 20 MG/100ML; MG/100ML; MG/100ML; MG/100ML
100 INJECTION, SOLUTION INTRAVENOUS CONTINUOUS
Status: DISCONTINUED | OUTPATIENT
Start: 2021-05-28 | End: 2021-05-27

## 2021-05-27 RX ADMIN — SODIUM CHLORIDE, SODIUM GLUCONATE, SODIUM ACETATE, POTASSIUM CHLORIDE, MAGNESIUM CHLORIDE, SODIUM PHOSPHATE, DIBASIC, AND POTASSIUM PHOSPHATE 1000 ML: .53; .5; .37; .037; .03; .012; .00082 INJECTION, SOLUTION INTRAVENOUS at 05:23

## 2021-05-27 RX ADMIN — HYDROMORPHONE HYDROCHLORIDE 4 MG: 4 TABLET ORAL at 15:57

## 2021-05-27 RX ADMIN — HYDROMORPHONE HYDROCHLORIDE 1 MG: 1 INJECTION, SOLUTION INTRAMUSCULAR; INTRAVENOUS; SUBCUTANEOUS at 11:15

## 2021-05-27 RX ADMIN — HYDROMORPHONE HYDROCHLORIDE 1 MG: 1 INJECTION, SOLUTION INTRAMUSCULAR; INTRAVENOUS; SUBCUTANEOUS at 19:33

## 2021-05-27 RX ADMIN — VANCOMYCIN HYDROCHLORIDE 750 MG: 750 INJECTION, SOLUTION INTRAVENOUS at 19:20

## 2021-05-27 RX ADMIN — METRONIDAZOLE 500 MG: 500 INJECTION, SOLUTION INTRAVENOUS at 18:34

## 2021-05-27 RX ADMIN — CEFEPIME HYDROCHLORIDE 2000 MG: 2 INJECTION, POWDER, FOR SOLUTION INTRAVENOUS at 16:37

## 2021-05-27 RX ADMIN — METHOCARBAMOL 500 MG: 500 TABLET ORAL at 17:58

## 2021-05-27 RX ADMIN — ACETAMINOPHEN 975 MG: 325 TABLET, FILM COATED ORAL at 22:35

## 2021-05-27 RX ADMIN — DOCUSATE SODIUM AND SENNOSIDES 2 TABLET: 8.6; 5 TABLET ORAL at 08:50

## 2021-05-27 RX ADMIN — ENOXAPARIN SODIUM 60 MG: 60 INJECTION SUBCUTANEOUS at 21:20

## 2021-05-27 RX ADMIN — CALCIUM GLUCONATE 1 G: 20 INJECTION, SOLUTION INTRAVENOUS at 07:36

## 2021-05-27 RX ADMIN — GABAPENTIN 100 MG: 100 CAPSULE ORAL at 09:32

## 2021-05-27 RX ADMIN — HYDROMORPHONE HYDROCHLORIDE 1 MG: 1 INJECTION, SOLUTION INTRAMUSCULAR; INTRAVENOUS; SUBCUTANEOUS at 03:05

## 2021-05-27 RX ADMIN — HYDROMORPHONE HYDROCHLORIDE 2 MG: 2 TABLET ORAL at 10:52

## 2021-05-27 RX ADMIN — OXYCODONE HYDROCHLORIDE 10 MG: 10 TABLET ORAL at 04:43

## 2021-05-27 RX ADMIN — HYDROMORPHONE HYDROCHLORIDE 1 MG: 1 INJECTION, SOLUTION INTRAMUSCULAR; INTRAVENOUS; SUBCUTANEOUS at 12:55

## 2021-05-27 RX ADMIN — HYDROMORPHONE HYDROCHLORIDE 1 MG: 1 INJECTION, SOLUTION INTRAMUSCULAR; INTRAVENOUS; SUBCUTANEOUS at 22:34

## 2021-05-27 RX ADMIN — CEFEPIME HYDROCHLORIDE 2000 MG: 2 INJECTION, POWDER, FOR SOLUTION INTRAVENOUS at 08:50

## 2021-05-27 RX ADMIN — SODIUM CHLORIDE, SODIUM GLUCONATE, SODIUM ACETATE, POTASSIUM CHLORIDE, MAGNESIUM CHLORIDE, SODIUM PHOSPHATE, DIBASIC, AND POTASSIUM PHOSPHATE 125 ML/HR: .53; .5; .37; .037; .03; .012; .00082 INJECTION, SOLUTION INTRAVENOUS at 07:29

## 2021-05-27 RX ADMIN — CEFAZOLIN SODIUM 2000 MG: 2 SOLUTION INTRAVENOUS at 07:23

## 2021-05-27 RX ADMIN — CHLORHEXIDINE GLUCONATE 15 ML: 1.2 SOLUTION ORAL at 21:21

## 2021-05-27 RX ADMIN — ENOXAPARIN SODIUM 60 MG: 60 INJECTION SUBCUTANEOUS at 09:32

## 2021-05-27 RX ADMIN — HYDROMORPHONE HYDROCHLORIDE 1 MG: 1 INJECTION, SOLUTION INTRAMUSCULAR; INTRAVENOUS; SUBCUTANEOUS at 17:34

## 2021-05-27 RX ADMIN — INSULIN LISPRO 2 UNITS: 100 INJECTION, SOLUTION INTRAVENOUS; SUBCUTANEOUS at 12:18

## 2021-05-27 RX ADMIN — ACETAMINOPHEN 975 MG: 325 TABLET, FILM COATED ORAL at 14:09

## 2021-05-27 RX ADMIN — METRONIDAZOLE 500 MG: 500 INJECTION, SOLUTION INTRAVENOUS at 12:54

## 2021-05-27 RX ADMIN — NOREPINEPHRINE BITARTRATE 2 MCG/MIN: 1 INJECTION, SOLUTION, CONCENTRATE INTRAVENOUS at 12:06

## 2021-05-27 RX ADMIN — VANCOMYCIN HYDROCHLORIDE 750 MG: 750 INJECTION, SOLUTION INTRAVENOUS at 11:44

## 2021-05-27 RX ADMIN — HYDROMORPHONE HYDROCHLORIDE 1 MG: 1 INJECTION, SOLUTION INTRAMUSCULAR; INTRAVENOUS; SUBCUTANEOUS at 09:16

## 2021-05-27 RX ADMIN — HYDROMORPHONE HYDROCHLORIDE 1 MG: 1 INJECTION, SOLUTION INTRAMUSCULAR; INTRAVENOUS; SUBCUTANEOUS at 07:23

## 2021-05-27 RX ADMIN — MELATONIN TAB 3 MG 6 MG: 3 TAB at 21:21

## 2021-05-27 RX ADMIN — GABAPENTIN 100 MG: 100 CAPSULE ORAL at 21:21

## 2021-05-27 RX ADMIN — VANCOMYCIN HYDROCHLORIDE 2000 MG: 10 INJECTION, POWDER, LYOPHILIZED, FOR SOLUTION INTRAVENOUS at 09:32

## 2021-05-27 RX ADMIN — HYDROMORPHONE HYDROCHLORIDE 4 MG: 4 TABLET ORAL at 20:27

## 2021-05-27 RX ADMIN — CHLORHEXIDINE GLUCONATE 15 ML: 1.2 SOLUTION ORAL at 08:50

## 2021-05-27 RX ADMIN — CLINDAMYCIN PHOSPHATE 600 MG: 600 INJECTION, SOLUTION INTRAVENOUS at 03:09

## 2021-05-27 RX ADMIN — ACETAMINOPHEN 975 MG: 325 TABLET, FILM COATED ORAL at 05:31

## 2021-05-27 RX ADMIN — INSULIN LISPRO 4 UNITS: 100 INJECTION, SOLUTION INTRAVENOUS; SUBCUTANEOUS at 16:38

## 2021-05-27 RX ADMIN — DOCUSATE SODIUM AND SENNOSIDES 2 TABLET: 8.6; 5 TABLET ORAL at 17:58

## 2021-05-27 RX ADMIN — HYDROMORPHONE HYDROCHLORIDE 1 MG: 1 INJECTION, SOLUTION INTRAMUSCULAR; INTRAVENOUS; SUBCUTANEOUS at 06:08

## 2021-05-27 RX ADMIN — METRONIDAZOLE 500 MG: 500 INJECTION, SOLUTION INTRAVENOUS at 04:00

## 2021-05-27 RX ADMIN — GABAPENTIN 100 MG: 100 CAPSULE ORAL at 16:37

## 2021-05-27 NOTE — SPEECH THERAPY NOTE
Speech therapy consult received  Patient passed RN dysphagia screen and is on a regular diet with thin liquids  Per medical notes, Neurological:      Mental Status: He is alert  Comments: Patient is speaking clearly in complete sentences  Patient is answering appropriately and able follow commands  Patient is moving all four extremities spontaneously  No facial droop  Tongue midline  RN reports no concerns with speech and/or swallowing  Will discontinue order  Re-consult as needed

## 2021-05-27 NOTE — CASE MANAGEMENT
[facility] for [facility] and PT ([phone])  CM sent referral back over [facility] for continuity of care

## 2021-05-27 NOTE — PLAN OF CARE
Problem: Nutrition/Hydration-ADULT  Goal: Nutrient/Hydration intake appropriate for improving, restoring or maintaining nutritional needs  Description: Monitor and assess patient's nutrition/hydration status for malnutrition  Collaborate with interdisciplinary team and initiate plan and interventions as ordered  Monitor patient's weight and dietary intake as ordered or per policy  Utilize nutrition screening tool and intervene as necessary  Determine patient's food preferences and provide high-protein, high-caloric foods as appropriate       INTERVENTIONS:  - Monitor oral intake, urinary output, labs, and treatment plans  - Assess nutrition and hydration status and recommend course of action  - Evaluate amount of meals eaten  - Assist patient with eating if necessary   - Allow adequate time for meals  - Recommend/ encourage appropriate diets, oral nutritional supplements, and vitamin/mineral supplements  - Order, calculate, and assess calorie counts as needed  - Recommend, monitor, and adjust tube feedings and TPN/PPN based on assessed needs  - Assess need for intravenous fluids  - Provide specific nutrition/hydration education as appropriate  - Include patient/family/caregiver in decisions related to nutrition  Outcome: Progressing     Problem: PAIN - ADULT  Goal: Verbalizes/displays adequate comfort level or baseline comfort level  Description: Interventions:  - Encourage patient to monitor pain and request assistance  - Assess pain using appropriate pain scale  - Administer analgesics based on type and severity of pain and evaluate response  - Implement non-pharmacological measures as appropriate and evaluate response  - Consider cultural and social influences on pain and pain management  - Notify physician/advanced practitioner if interventions unsuccessful or patient reports new pain  Outcome: Progressing     Problem: INFECTION - ADULT  Goal: Absence or prevention of progression during hospitalization  Description: INTERVENTIONS:  - Assess and monitor for signs and symptoms of infection  - Monitor lab/diagnostic results  - Monitor all insertion sites, i e  indwelling lines, tubes, and drains  - Monitor endotracheal if appropriate and nasal secretions for changes in amount and color  - [place] appropriate cooling/warming therapies per order  - Administer medications as ordered  - Instruct and encourage patient and family to use good hand hygiene technique  - Identify and instruct in appropriate isolation precautions for identified infection/condition  Outcome: Progressing  Goal: Absence of fever/infection during neutropenic period  Description: INTERVENTIONS:  - Monitor WBC    Outcome: Progressing     Problem: SAFETY ADULT  Goal: Patient will remain free of falls  Description: INTERVENTIONS:  - Assess patient frequently for physical needs  -  Identify cognitive and physical deficits and behaviors that affect risk of falls    -  [place] fall precautions as indicated by assessment   - Educate patient/family on patient safety including physical limitations  - Instruct patient to call for assistance with activity based on assessment  - Modify environment to reduce risk of injury  - Consider OT/PT consult to assist with strengthening/mobility  Outcome: Progressing  Goal: Maintain or return to baseline ADL function  Description: INTERVENTIONS:  -  Assess patient's ability to carry out ADLs; assess patient's baseline for ADL function and identify physical deficits which impact ability to perform ADLs (bathing, care of mouth/teeth, toileting, grooming, dressing, etc )  - Assess/evaluate cause of self-care deficits   - Assess range of motion  - Assess patient's mobility; develop plan if impaired  - Assess patient's need for assistive devices and provide as appropriate  - Encourage maximum independence but intervene and supervise when necessary  - Involve family in performance of ADLs  - Assess for home care needs following discharge   - Consider OT consult to assist with ADL evaluation and planning for discharge  - Provide patient education as appropriate  Outcome: Progressing  Goal: Maintain or return mobility status to optimal level  Description: INTERVENTIONS:  - Assess patient's baseline mobility status (ambulation, transfers, stairs, etc )    - Identify cognitive and physical deficits and behaviors that affect mobility  - Identify mobility aids required to assist with transfers and/or ambulation (gait belt, sit-to-stand, lift, walker, cane, etc )  - [place] fall precautions as indicated by assessment  - Record patient progress and toleration of activity level on Mobility SBAR; progress patient to next Phase/Stage  - Instruct patient to call for assistance with activity based on assessment  - Consider rehabilitation consult to assist with strengthening/weightbearing, etc   Outcome: Progressing

## 2021-05-27 NOTE — PROGRESS NOTES
Daily Progress Note - Critical Care   Jorden Suazo 37 y o  male MRN: [id number]  Unit/Bed#: MICU 13 Encounter: [id number]        ----------------------------------------------------------------------------------------  HPI/24hr events:     Jorden Suazo is a 37 y o  male, with a history significant for hydradenitis suppurativa with history of left groin mass excision seven weeks ago, DM, morbid obesity (BMI >55), CAD/MI, who presented, on 5/26, with a one week history of progressively worsening fever with associated chills, diaphoresis, left leg pain, N/V, lightheadedness  He was found to have sepsis, CLOVIS, and a subcutaneous abscess within the left anterior upper thigh underlying superficial wound with reactive inguinal and external iliac lymphadenopathy  The collection was drained and antibiotics started; however, he subsequently became hypotensive, with a SBP in the 70s, febrile (103-104F), and worsening LLE pain  He was taken to the OR, out of concern for necrotizing fasciitis and the fluid collection was debrided  While in the OR, he required norepinephrine gtt, at a rate of 10  This was eventually taken off at the end of the case  No evidence of necrotizing fasciitis was seen in the OR     ________________________________________________    Overnight nursing concerns included: pain management  Increasing vasopressor need overnight  T max 103 5F  T current 99F  RR 20s  HR 80s-90s  MAPs 50s-60s  Base deficit improving -4 1 from -4 3  Creatinine improving 1 59 from 1 71  Lactic acid   7 from 1 1  WBC 15 32 from 13 64  RBC 7 6 from 7 5  CVC day 1  No debbie in place  ---------------------------------------------------------------------------------------  SUBJECTIVE    Patient reports improvement in his pain  He continues to deny CP, abdominal pain, SOB  He reports diaphoresis and decreased urine  Review of Systems   Constitutional: Positive for fever     HENT: Negative for trouble swallowing  Eyes: Negative for visual disturbance  Respiratory: Negative for shortness of breath  Cardiovascular: Negative for chest pain  Gastrointestinal: Negative for abdominal pain  Endocrine: Negative for polyuria  Genitourinary: Positive for decreased urine volume  Negative for dysuria  Musculoskeletal: Negative for gait problem  Skin: Positive for wound  Allergic/Immunologic: Positive for environmental allergies  Neurological: Positive for numbness  Negative for weakness  Hematological: Positive for adenopathy  Psychiatric/Behavioral: Negative for confusion  Review of systems was reviewed and negative unless stated above in HPI/24-hour events   ---------------------------------------------------------------------------------------  Assessment and Plan:    Neuro:   · Diagnosis: No acute issues, post-surgical (prior to the procedure on 5/26) numbness of the proximal left thigh  ? Plan:   ? CAM-ICU  ? Goals RAAS -2 to 0  ? Sleep-Wake Cycle Regulation     · Diagnosis: Pain and Sedation  ? Plan:   ? Tylenol, Oxycodone PRN, Dilaudid PRN, Zofran PRN        CV:   · Diagnosis: Hypotension, history of CAD  ? Plan:   ? Norepinephrine gtt, currently at a rate of 6  ? Titrate down as tolerated  ? MAP goal >65  ? Lactate remains WNL  ? Trend endpoints  ? Continue to closely monitor hemodynamics  ? Closely monitor on tele        Pulm:  · Diagnosis: Hypoxia   ? Plan:   ? CXR 5/26: No acute cardiopulmonary disease  ? Continue nasal cannula   § Currently 96% on 4L NC  ? Good pulm hygene  ? Frequent suctioning  ? Oral care: Peridex        GI:   · Diagnosis: No acute issues  ? Plan:   ? Sennakot, miralax, dulcolax bowel regimen        :   · Diagnosis: CLOVIS  ? Plan:   ? No lewis in place  ? UA  ? Follow-up urine culture  ? Trend BMP  ?  Strict I&Os        F/E/N:   · Plan:   · Fluids: Continue Isolyte infusion @ 125 cc/hr  · Electrolytes: Trend and replete as needed  · Nutrition: NPO        Heme/Onc: · Diagnosis: No acute issues  ? Plan:  ? Trend CBC  ? Heparin SubQ VTE prophylaxis         Endo: DM, morbid obesity  · Diagnosis:   ? Plan:  § Continue ISS   § Maintain BS between 140-180     ID:   · Diagnosis: LLE abscess, cellulitis   ? Plan:   ? Follow-up pending cultures   ? Continue cefazolin, metronidazole, and clindamycin   ? Abx started on 5/26  ? Continue to closely monitor for signs of infection  ? Trend daily fever curve and WBC        MSK/Skin:   · Diagnosis: Hydradenitis suppurativa, s/p operative I&D, morbid obesity  ? Plan:   ? Continue to turn and reposition frequently  ? Continue pressure off-loading  ? Wound care  ? Borders marked on skin  ? Continue abx, trend endpoints   ? PT/OT    Disposition: Continue Critical Care   Code Status: Level 1 - Full Code  ---------------------------------------------------------------------------------------  ICU CORE MEASURES    Prophylaxis   VTE Pharmacologic Prophylaxis: Heparin  VTE Mechanical Prophylaxis: sequential compression device  Stress Ulcer Prophylaxis: Prophylaxis Not Indicated     ABCDE Protocol (if indicated)  Plan to perform spontaneous awakening trial today? Not applicable  Plan to perform spontaneous breathing trial today? Not applicable  Obvious barriers to extubation? Not applicable  CAM-ICU: Negative    Invasive Devices Review  Invasive Devices     Central Venous Catheter Line            CVC Central Lines 05/26/21 Triple less than 1 day          Peripheral Intravenous Line            Peripheral IV 05/26/21 Medial;Right Antecubital less than 1 day    Peripheral IV 05/26/21 Right Antecubital less than 1 day    Peripheral IV 05/26/21 Right Hand less than 1 day          Arterial Line            Arterial Line 05/26/21 less than 1 day              Can any invasive devices be discontinued today?  No  ---------------------------------------------------------------------------------------  OBJECTIVE    Vitals   Vitals:    05/27/21 0115 05/27/21 0130 21 0145 21 0300   BP:       BP Location:       Pulse: 82 82 92    Resp:       Temp:       TempSrc:       SpO2: 94% 95% 96%    Weight:    (!) 168 kg (370 lb 9 5 oz)   Height:         Temp (24hrs), Av 7 °F (38 2 °C), Min:99 °F (37 2 °C), Max:103 5 °F (39 7 °C)  Current: Temperature: 99 °F (37 2 °C)  HR: 92  BP: 100/46  RR: 20  SpO2: 96    Respiratory:  SpO2: SpO2: 96 %  Nasal Cannula O2 Flow Rate (L/min): 4 L/min    Invasive/non-invasive ventilation settings   Respiratory    Lab Data (Last 4 hours)       0001            pH, Arterial       7 310           pCO2, Arterial       44 5           pO2, Arterial       76 7           HCO3, Arterial       21 9           Base Excess, Arterial       -4 1                O2/Vent Data     None                Physical Exam  Vitals signs and nursing note reviewed  Constitutional:       General: He is not in acute distress  Appearance: He is obese  HENT:      Head: Normocephalic  Right Ear: External ear normal       Left Ear: External ear normal       Nose: Nose normal  No rhinorrhea  Mouth/Throat:      Mouth: Mucous membranes are dry  Pharynx: Oropharynx is clear  No oropharyngeal exudate or posterior oropharyngeal erythema  Eyes:      General:         Right eye: No discharge  Left eye: No discharge  Pupils: Pupils are equal, round, and reactive to light  Neck:      Musculoskeletal: Neck supple  No neck rigidity or muscular tenderness  Cardiovascular:      Rate and Rhythm: Normal rate and regular rhythm  Pulses: Normal pulses  Heart sounds: Normal heart sounds  No murmur  No friction rub  No gallop  Comments: 2+ DP  Pulmonary:      Effort: Pulmonary effort is normal  No respiratory distress  Breath sounds: Normal breath sounds  No stridor  No wheezing, rhonchi or rales  Abdominal:      General: Abdomen is flat  Bowel sounds are normal  There is no distension  Palpations: Abdomen is soft  Tenderness: There is no abdominal tenderness  There is no guarding or rebound  Musculoskeletal:         General: Swelling and tenderness (Proximal LLE) present  Right lower leg: No edema  Left lower leg: No edema  Comments: No obvious bleeding from surgical site  Tender to palpation  LLE is soft to palpation  No pain out of proportion to exam    Skin:     General: Skin is warm and dry  Capillary Refill: Capillary refill takes less than 2 seconds  Findings: Erythema (Proximal and distal LLE  Warm to touch) present  Neurological:      Mental Status: He is alert  Comments: Patient is speaking clearly in complete sentences  Patient is answering appropriately and able follow commands  Patient is moving all four extremities spontaneously  No facial droop  Tongue midline     Psychiatric:         Mood and Affect: Mood normal          Behavior: Behavior normal          Laboratory and Diagnostics:  Results from last 7 days   Lab Units 05/27/21 0001 05/26/21 2006 05/26/21  0942   WBC Thousand/uL 15 32* 13 64* 15 20*   HEMOGLOBIN g/dL 7 6* 7 5* 9 1*   HEMATOCRIT % 25 8* 24 8* 29 1*   PLATELETS Thousands/uL 336 309 380   NEUTROS PCT %  --   --  87*   BANDS PCT %  --  41*  --    MONOS PCT %  --   --  5   MONO PCT %  --  3*  --      Results from last 7 days   Lab Units 05/27/21 0001 05/26/21 2006 05/26/21  0942   SODIUM mmol/L 135* 134* 131*   POTASSIUM mmol/L 4 4 3 8 3 9   CHLORIDE mmol/L 104 103 95*   CO2 mmol/L 24 23 26   ANION GAP mmol/L 7 8 10   BUN mg/dL 22 19 15   CREATININE mg/dL 1 59* 1 71* 1 52*   CALCIUM mg/dL 7 4* 7 3* 8 3   GLUCOSE RANDOM mg/dL 124 137 175*   ALT U/L  --   --  22   AST U/L  --   --  21   ALK PHOS U/L  --   --  99   ALBUMIN g/dL  --   --  2 4*   TOTAL BILIRUBIN mg/dL  --   --  0 42     Results from last 7 days   Lab Units 05/26/21 2006   MAGNESIUM mg/dL 1 2*   PHOSPHORUS mg/dL 4 5      Results from last 7 days   Lab Units 05/26/21  0942   INR  1 20*   PTT seconds 36      Results from last 7 days   Lab Units 05/26/21  0942   TROPONIN I ng/mL <0 02     Results from last 7 days   Lab Units 05/27/21  0001 05/26/21 2006 05/26/21  1857 05/26/21  0942   LACTIC ACID mmol/L 0 7 1 1 1 9 1 3     ABG:  Results from last 7 days   Lab Units 05/27/21  0001   PH ART  7 310*   PCO2 ART mm Hg 44 5*   PO2 ART mm Hg 76 7   HCO3 ART mmol/L 21 9*   BASE EXC ART mmol/L -4 1   ABG SOURCE  Line, Arterial     VBG:  Results from last 7 days   Lab Units 05/27/21  0001   ABG SOURCE  Line, Arterial           Micro  Results from last 7 days   Lab Units 05/26/21  0942 05/26/21  0935   BLOOD CULTURE  Received in Microbiology Lab  Culture in Progress  Received in Microbiology Lab  Culture in Progress  EKG: Normal rate, regular rhythm, normal axis, no ectopy, no ST elevations  Imaging: CXR, CT A/P I have personally reviewed pertinent reports  Intake and Output  I/O       05/25 0701 - 05/26 0700 05/26 0701 - 05/27 0700    I V   3863 3    IV Piggyback  145 8    Total Intake  4009  2    Blood  50    Total Output  50    Net  +3959 2              UOP: 250 ml    Height and Weights   Height: 5' 7" (170 2 cm)  IBW (Ideal Body Weight): 66 1 kg  Body mass index is 58 04 kg/m²  Weight (last 2 days)     Date/Time   Weight    05/27/21 0300   (!) 168 (370 59)                Nutrition       Diet Orders   (From admission, onward)             Start     Ordered    05/26/21 2131  Diet NPO  Diet effective now     Question Answer Comment   Diet Type NPO    RD to adjust diet per protocol? Yes        05/26/21 2151              TF currently running at 0 ml/hr with a goal of 0 ml/hr   Formula: N/A      Active Medications  Scheduled Meds:  Current Facility-Administered Medications   Medication Dose Route Frequency Provider Last Rate    acetaminophen  975 mg Oral Q8H David Badillo MD      bisacodyl  10 mg Rectal Daily PRN Marjorie Doss MD      cefazolin  2,000 mg Intravenous Maye Sweet MD 2,000 mg (05/26/21 2345)    chlorhexidine  15 mL Mouth/Throat Q12H [facility] Daryl Ward MD      clindamycin  600 mg Intravenous Q8H Akshat Matias  mg (05/27/21 0309)    HYDROmorphone  1 mg Intravenous Q3H PRN Daryl Ward MD      insulin lispro  2-12 Units Subcutaneous TID AC Akshat Matias MD      melatonin  6 mg Oral HS Daryl Ward MD      metroNIDAZOLE  500 mg Intravenous Niyah Fontana MD      multi-electrolyte  125 mL/hr Intravenous Continuous Daryl Ward  mL/hr (05/26/21 2322)    norepinephrine  1-30 mcg/min Intravenous Titrated Akshat Matias MD 7 mcg/min (05/27/21 0100)    ondansetron  4 mg Intravenous Q6H PRN Akshat Matias MD      oxyCODONE  10 mg Oral Q4H PRN Akshat Matias MD      oxyCODONE  5 mg Oral Q4H PRN Akshat Matias MD      polyethylene glycol  17 g Oral Daily Daryl Ward MD      senna-docusate sodium  2 tablet Oral BID Daryl Ward MD      sodium chloride  125 mL/hr Intravenous Continuous Akshat Matias MD Stopped (05/26/21 2322)     Continuous Infusions:  multi-electrolyte, 125 mL/hr, Last Rate: 125 mL/hr (05/26/21 2322)  norepinephrine, 1-30 mcg/min, Last Rate: 7 mcg/min (05/27/21 0100)  sodium chloride, 125 mL/hr, Last Rate: Stopped (05/26/21 2322)      PRN Meds:   bisacodyl, 10 mg, Daily PRN  HYDROmorphone, 1 mg, Q3H PRN  ondansetron, 4 mg, Q6H PRN  oxyCODONE, 10 mg, Q4H PRN  oxyCODONE, 5 mg, Q4H PRN        Allergies   Allergies   Allergen Reactions    Amoxicillin Hives     ---------------------------------------------------------------------------------------  Advance Directive and Living Will:      Power of :    POLST:    ---------------------------------------------------------------------------------------  Care Time Delivered:   No Critical Care time spent     Derian Reno MD      Portions of the record may have been created with voice recognition software    Occasional wrong word or "sound a like" substitutions may have occurred due to the inherent limitations of voice recognition software    Read the chart carefully and recognize, using context, where substitutions have occurred

## 2021-05-27 NOTE — QUICK NOTE
Wound check:    wound assessed at bedside today on 05/27/2021  Replaced Betadine-soaked Kerlix with a new Betadine-soaked Kerlix inside the wound  Utilized Maxorb over top the previous surgical debridement site of the medial left thigh  Patient tolerated dressing change well without any immediate complication  No new purulent drainage  No worsening cellulitis  Wounds otherwise appeared clean, dry, intact  No new bleeding

## 2021-05-27 NOTE — NURSING NOTE
Received post op from PACU at 2045 via bed  Pt awake alert, oriented x4  C/o 10/10 pain at op site  Surgical dressings intact, staining with betadine present  No blood or other drainage visible  Bilateral pedal pulses palpable, equal  Hypotensive  Surgical CC resident contacted  Orders received  Continues to c/o 10/10 burning pain to left leg despite ordered PO and IV analgesia  Discussed with resident  New orders received

## 2021-05-27 NOTE — CONSULTS
[address] [facility] M[place]s 37 y o  male MRN: [id number]  Unit/Bed#: MICU 13 Encounter: [id number]      -------------------------------------------------------------------------------------------------------------  Chief Complaint: Leg pain    History of Present Illness   HX and PE limited by: No limitations    Anthony Payne is a 37 y o  male, with a history significant for hydradenitis suppurativa with history of left groin mass excision seven weeks ago, DM, morbid obesity (BMI >55), CAD/MI, who presented, on 5/26, with a one week history of progressively worsening fever with associated chills, diaphoresis, left leg pain, N/V, lightheadedness  He was found to have sepsis, CLOVIS, and a subcutaneous abscess within the left anterior upper thigh underlying superficial wound with reactive inguinal and external iliac lymphadenopathy  The collection was drained and antibiotics started; however, he subsequently became hypotensive, with a SBP in the 70s, febrile (103-104F), and worsening LLE pain  He was taken to the OR, out of concern for necrotizing fasciitis and the fluid collection was debrided  While in the OR, he required norepinephrine gtt, at a rate of 10  This was eventually taken off at the end of the case  No evidence of necrotizing fasciitis was seen in the OR  Currently, patient describes 10/10 LLE pain that is pressure-like in character, and without clear exacerbating or remitting factors  He reports numbness of the left anterior thigh; however, this is chronic after his prior procedure       History obtained from chart review and the patient   -------------------------------------------------------------------------------------------------------------  Assessment and Plan:    Neuro:    Diagnosis: No acute issues, post-surgical (prior to the procedure on 5/26) numbness of the proximal left thigh  o Plan:   o CAM-ICU  o Goals RAAS -2 to 0  o Sleep-Wake Cycle Regulation     Diagnosis: Pain and Sedation  o Plan:   o Tylenol, Oxycodone PRN, Dilaudid PRN, Zofran PRN      CV:    Diagnosis: Hypotension, history of CAD  o Plan:   o Norepinephrine gtt, currently at a rate of 4  - Titrate down as tolerated  - MAP goal >65  o Lactate WNL  o Trend endpoints  o Continue to closely monitor hemodynamics  o Closely monitor on tele  o Baseline ECG      Pulm:   Diagnosis: Hypoxia   o Plan:   o CXR 5/26: No acute cardiopulmonary disease  o Continue nasal cannula   - Currently 94% on 4L  o Good pulm hygene  o Frequent suctioning  o Oral care: Peridex      GI:    Diagnosis: No acute issues  o Plan:   o Sennakot, miralax, dulcolax bowel regimen      :    Diagnosis: No acute issues  o Plan:   o Trend BMP  o Strict I&Os      F/E/N:    Plan:    Fluids: Continue Isolyte infusion @ 125 cc/hr   Electrolytes: Trend and replete as needed   Nutrition: NPO      Heme/Onc:    Diagnosis: No acute issues  o Plan:  o Trend CBC  o Heparin SubQ VTE prophylaxis        Endo: DM, morbid obesity   Diagnosis:   o Plan:  - Continue ISS   - Maintain BS between 140-180      ID:    Diagnosis: Abscess   o Plan:   o Follow-up pending cultures   o Continue cefazolin, metronidazole, and clindamycin   o Continue to closely monitor for signs of infection  o Trend daily fever curve and WBC      MSK/Skin:    Diagnosis: Hydradenitis suppurativa, s/p operative I&D, morbid obesity  o Plan:   o Continue to turn and reposition frequently  o Continue pressure off-loading  o Wound care  o Borders marked on skin  o Continue abx, trend endpoints   o PT/OT      Disposition: Admit to Critical Care   Code Status: Level 1 - Full Code  --------------------------------------------------------------------------------------------------------------  Review of Systems   Constitutional: Positive for chills and fever  HENT: Negative for trouble swallowing  Eyes: Negative for visual disturbance  Respiratory: Negative for shortness of breath  Cardiovascular: Negative for chest pain  Gastrointestinal: Positive for nausea and vomiting  Negative for abdominal pain  Endocrine: Negative for polyuria  Genitourinary: Negative for dysuria  Musculoskeletal: Negative for gait problem  Skin: Positive for wound  Allergic/Immunologic: Positive for environmental allergies  Neurological: Positive for numbness  Negative for weakness  Hematological: Positive for adenopathy  Psychiatric/Behavioral: Negative for confusion  A 12-point, complete review of systems was reviewed and negative except as stated above     Physical Exam  Vitals signs reviewed  Constitutional:       General: He is not in acute distress  Appearance: He is obese  HENT:      Head: Normocephalic  Right Ear: External ear normal       Left Ear: External ear normal       Nose: Nose normal       Mouth/Throat:      Mouth: Mucous membranes are moist       Pharynx: Oropharynx is clear  No oropharyngeal exudate or posterior oropharyngeal erythema  Eyes:      General:         Right eye: No discharge  Left eye: No discharge  Extraocular Movements: Extraocular movements intact  Pupils: Pupils are equal, round, and reactive to light  Neck:      Musculoskeletal: Neck supple  No neck rigidity or muscular tenderness  Cardiovascular:      Rate and Rhythm: Normal rate and regular rhythm  Pulses: Normal pulses  Heart sounds: Normal heart sounds  No murmur  No friction rub  No gallop  Comments: 2+ DP  Pulmonary:      Effort: Pulmonary effort is normal  No respiratory distress  Breath sounds: Normal breath sounds  No stridor  No wheezing, rhonchi or rales  Abdominal:      General: Bowel sounds are normal  There is no distension  Palpations: Abdomen is soft  Tenderness: There is no abdominal tenderness  There is no guarding or rebound  Musculoskeletal:         General: Tenderness (Anterior LLE thigh) present        Right lower leg: No edema  Left lower leg: No edema  Lymphadenopathy:      Cervical: No cervical adenopathy  Skin:     General: Skin is warm and dry  Capillary Refill: Capillary refill takes less than 2 seconds  Neurological:      Mental Status: He is alert  Comments: AAO x3  Cranial nerves 2-12 intact  5/5 strength in all 4 extremities  Sensation to light touch in intact in all 4 extremities except for the proximal anterior thigh  Patient is speaking clearly complete sentences  Patient is answering appropriately and able follow commands  Psychiatric:         Mood and Affect: Mood normal          Behavior: Behavior normal        --------------------------------------------------------------------------------------------------------------  Vitals:   Vitals:    05/26/21 2100 05/26/21 2115 05/26/21 2130 05/26/21 2145   BP:  (!) 82/55     BP Location:       Pulse: 86 86 84 80   Resp:       Temp:       TempSrc:       SpO2: 92% 94% 95% 97%     Temp  Min: 99 °F (37 2 °C)  Max: 103 5 °F (39 7 °C)        There is no height or weight on file to calculate BMI    N/A    Laboratory and Diagnostics:  Results from last 7 days   Lab Units 05/26/21 2006 05/26/21  0942   WBC Thousand/uL 13 64* 15 20*   HEMOGLOBIN g/dL 7 5* 9 1*   HEMATOCRIT % 24 8* 29 1*   PLATELETS Thousands/uL 309 380   NEUTROS PCT %  --  87*   BANDS PCT % 41*  --    MONOS PCT %  --  5   MONO PCT % 3*  --      Results from last 7 days   Lab Units 05/26/21 2006 05/26/21  0942   SODIUM mmol/L 134* 131*   POTASSIUM mmol/L 3 8 3 9   CHLORIDE mmol/L 103 95*   CO2 mmol/L 23 26   ANION GAP mmol/L 8 10   BUN mg/dL 19 15   CREATININE mg/dL 1 71* 1 52*   CALCIUM mg/dL 7 3* 8 3   GLUCOSE RANDOM mg/dL 137 175*   ALT U/L  --  22   AST U/L  --  21   ALK PHOS U/L  --  99   ALBUMIN g/dL  --  2 4*   TOTAL BILIRUBIN mg/dL  --  0 42     Results from last 7 days   Lab Units 05/26/21 2006   MAGNESIUM mg/dL 1 2*   PHOSPHORUS mg/dL 4 5      Results from last 7 days Lab Units 05/26/21  0942   INR  1 20*   PTT seconds 36      Results from last 7 days   Lab Units 05/26/21  0942   TROPONIN I ng/mL <0 02     Results from last 7 days   Lab Units 05/26/21 2006 05/26/21  1857 05/26/21  0942   LACTIC ACID mmol/L 1 1 1 9 1 3     ABG:  Results from last 7 days   Lab Units 05/26/21 2007   PH ART  7 361   PCO2 ART mm Hg 37 4   PO2 ART mm Hg 70 4*   HCO3 ART mmol/L 20 7*   BASE EXC ART mmol/L -4 3   ABG SOURCE  Line, Arterial     VBG:  Results from last 7 days   Lab Units 05/26/21 2007   ABG SOURCE  Line, Arterial           Micro:  Results from last 7 days   Lab Units 05/26/21  0942 05/26/21  0935   BLOOD CULTURE  Received in Microbiology Lab  Culture in Progress  Received in Microbiology Lab  Culture in Progress  EKG: Pending  Imaging: CXR, CT A/P  I have personally reviewed pertinent reports        Historical Information   Past Medical History:   Diagnosis Date    Coronary artery disease     [facility] mellitus [facility]     Heart disease     CAD   s/p ptca with 1 stent 2012    Hidradenitis suppurativa     groin    Hyperlipidemia     Hypertension     MI (myocardial infarction) ([facility] [address] [facility]     " silent " M I  in the past    Morbid obesity with BMI of 50 0-59 9, adult ([facility] [address] [facility]     Myocardial infarction ([facility] [address] [facility]     Nicotine dependence     Obesity     Pilonidal cyst      Past Surgical History:   Procedure Laterality Date    CORONARY ANGIOPLASTY WITH STENT PLACEMENT      INCISION AND DRAINAGE OF WOUND N/A 1/24/2017    Procedure: INCISION AND DRAINAGE (I&D) BUTTOCK, PILONIDAL CYST;  Surgeon: Nathalie Lloyd MD;  Location: [address] [place] [address];  Service:    [facility] LEG SURGERY Left     I&D of left leg 2012    WI EXC SKIN BENIG >4 CM TRUNK,ARM,LEG Left 4/6/2021    Procedure: EXCISION THIGH MASS;  Surgeon: Diane Dunn MD;  [facility];  Service: General    WI NEG PRESS WOUND TX, < 50 CM Left 4/6/2021    Procedure: APPLICATION VAC DRESSING THIGH;  Surgeon: Diane Dunn MD;  [facility]; Service: General    WOUND DEBRIDEMENT Left 4/17/2021    Procedure: DEBRIDEMENT WOUND AND DRESSING CHANGE [facility] OUT);   Surgeon: Parth Abreu DO;  [facility];  Service: General    WOUND DEBRIDEMENT Left 4/22/2021    Procedure: DEBRIDEMENT LOWER EXTREMITY [facility] OUT), left groin and thigh;  Surgeon: Jonathan Edwrads MD;  [facility];  Service: General     Social History   Social History     Substance and Sexual Activity   Alcohol Use Not Currently    Comment: rarely     Social History     Substance and Sexual Activity   Drug Use No     Social History     Tobacco Use   Smoking Status Former Smoker    Packs/day: 1 00    Years: 20 00    Pack years: 20 00    Types: Cigarettes   Smokeless Tobacco Never Used     Exercise History:   Family History:   Family History   Problem Relation Age of Onset    Heart disease Father      I have reviewed this patient's family history and commented on sigificant items within the HPI      Medications:  Current Facility-Administered Medications   Medication Dose Route Frequency    acetaminophen (TYLENOL) tablet 975 mg  975 mg Oral Q8H [facility]    bisacodyl (DULCOLAX) rectal suppository 10 mg  10 mg Rectal Daily PRN    calcium gluconate 1 g in sodium chloride 0 9% 50 mL (premix)  1 g Intravenous Once    ceFAZolin (ANCEF) IVPB (premix in dextrose) 2,000 mg 50 mL  2,000 mg Intravenous Q8H    chlorhexidine (PERIDEX) 0 12 % oral rinse 15 mL  15 mL Mouth/Throat Q12H [facility]    clindamycin (CLEOCIN) IVPB (premix in dextrose) 600 mg 50 mL  600 mg Intravenous Q8H    HYDROmorphone (DILAUDID) injection 0 5 mg  0 5 mg Intravenous Q3H PRN    insulin lispro (HumaLOG) 100 units/mL subcutaneous injection 2-12 Units  2-12 Units Subcutaneous TID AC    magnesium sulfate 2 g/50 mL IVPB (premix) 2 g  2 g Intravenous Once    melatonin tablet 6 mg  6 mg Oral HS    metroNIDAZOLE (FLAGYL) IVPB (premix) 500 mg 100 mL  500 mg Intravenous Q8H    multi-electrolyte (PLASMALYTE-A/ISOLYTE-S PH 7 4) IV solution  125 mL/hr Intravenous Continuous    norepinephrine (LEVOPHED) 4 mg (STANDARD CONCENTRATION) IV in sodium chloride 0 9% 250 mL  1-30 mcg/min Intravenous Titrated    ondansetron (ZOFRAN) injection 4 mg  4 mg Intravenous Q6H PRN    oxyCODONE (ROXICODONE) immediate release tablet 10 mg  10 mg Oral Q4H PRN    oxyCODONE (ROXICODONE) IR tablet 5 mg  5 mg Oral Q4H PRN    [START ON 5/27/2021] polyethylene glycol (MIRALAX) packet 17 g  17 g Oral Daily    potassium chloride 20 mEq IVPB (premix)  20 mEq Intravenous Once    senna-docusate sodium (SENOKOT S) 8 6-50 mg per tablet 2 tablet  2 tablet Oral BID    sodium chloride 0 9 % infusion  125 mL/hr Intravenous Continuous     Home medications:  Prior to Admission Medications   Prescriptions Last Dose Informant Patient Reported? Taking?    Omega-3 Fatty Acids (fish oil) 1,000 mg   No No   Sig: Take 1 capsule (1,000 mg total) by mouth 2 (two) times a day   Ticagrelor (BRILINTA PO)   Yes No   Sig: Take by mouth   acetaminophen (TYLENOL) 325 mg tablet   No No   Sig: Take 3 tablets (975 mg total) by mouth every 8 (eight) hours   amLODIPine (NORVASC) 10 mg tablet   No No   Sig: Take 1 tablet (10 mg total) by mouth daily   aspirin (ECOTRIN LOW STRENGTH) 81 mg EC tablet   No No   Sig: Take 1 tablet (81 mg total) by mouth daily   atorvastatin (LIPITOR) 80 mg tablet   No No   Sig: Take 1 tablet (80 mg total) by mouth daily   carvedilol (COREG) 6 25 mg tablet   No No   Sig: Take 1 tablet (6 25 mg total) by mouth 2 (two) times a day with meals   gabapentin (NEURONTIN) 300 mg capsule   No No   Sig: Take 1 capsule (300 mg total) by mouth 3 (three) times a day for 14 days   Patient not taking: Reported on 5/10/2021   glimepiride (AMARYL) 4 mg tablet   No No   Sig: Take 1 tablet (4 mg total) by mouth daily with breakfast   lisinopril (ZESTRIL) 10 mg tablet   No No   Sig: Take 1 tablet (10 mg total) by mouth daily   metFORMIN (GLUCOPHAGE) 1000 MG tablet   No No   Sig: Take 1 tablet (1,000 mg total) by mouth 2 (two) times a day with meals   methocarbamol (ROBAXIN) 750 mg tablet   No No   Sig: Take 1 tablet (750 mg total) by mouth every 6 (six) hours   Patient not taking: Reported on 5/10/2021      Facility-Administered Medications: None     Allergies: Allergies   Allergen Reactions    Amoxicillin Hives     ------------------------------------------------------------------------------------------------------------  Advance Directive and Living Will:      Power of :    POLST:    ------------------------------------------------------------------------------------------------------------  Anticipated Length of Stay is > 2 midnights    Care Time Delivered:   No Critical Care time spent       Derian Reno MD        Portions of the record may have been created with voice recognition software  Occasional wrong word or "sound a like" substitutions may have occurred due to the inherent limitations of voice recognition software    Read the chart carefully and recognize, using context, where substitutions have occurred

## 2021-05-27 NOTE — UTILIZATION REVIEW
Inpatient Admission Authorization Request   NOTIFICATION OF INPATIENT ADMISSION/INPATIENT AUTHORIZATION REQUEST   SERVICING FACILITY:   [address] [place] [address]  Address: [address] [place] [address], [address] [place] [address]  Tax ID: [id number]  NPI: [id number]  Place of Service: Inpatient [address] [facility]  [address]  Place of Service Code: 24     ATTENDING PROVIDER:  Attending Name and NPI#: Guzman Velazquez [[id number]]  Address: [address] [place] [address], [address] [place] [address]  Phone: [phone]     UTILIZATION REVIEW CONTACT:  [facility] Utilization   Network Utilization Review Department  Phone: [phone]  Fax: [phone]  Email: [email or web address]     PHYSICIAN ADVISORY SERVICES:  FOR GJZE-YY-GDEX REVIEW - MEDICAL NECESSITY DENIAL  Phone: [phone]  Fax: [phone]  Email: [email or web address]     TYPE OF REQUEST:  Inpatient Status     ADMISSION INFORMATION:  ADMISSION DATE/TIME: 5/27/21 0753  PATIENT DIAGNOSIS CODE/DESCRIPTION:  Cutaneous abscess of groin [L02 214]  Thigh abscess [L02 419]  DISCHARGE DATE/TIME: No discharge date for patient encounter  DISCHARGE DISPOSITION (IF DISCHARGED): [address] [facility]     IMPORTANT INFORMATION:  Please contact the Talya Almaguer directly with any questions or concerns regarding this request  Department voicemails are confidential     Send requests for admission clinical reviews, concurrent reviews, approvals, and administrative denials due to lack of clinical to fax [phone]

## 2021-05-27 NOTE — UTILIZATION REVIEW
Initial Clinical Review    Admission: Date/Time/Statement:     OBSERVATION 5-26- 21 1450 CHANGED TO INPATIENT 5-27-21 0753  FOR CONTINUED TREATMENT OF WOUND INFECTION, SEPSIS      Admission Orders (From admission, onward)     Ordered        05/27/21 0753  Inpatient Admission  Once         05/26/21 1450  Place in Observation  Once                    Inpatient Admission     Standing Status:   Standing     Number of Occurrences:   1     Order Specific Question:   Level of Care     Answer:   Critical Care [15]     Order Specific Question:   Estimated length of stay     Answer:   More than 2 Midnights     Order Specific Question:   Certification     Answer:   I certify that inpatient services are medically necessary for this patient for a duration of greater than two midnights  See H&P and MD Progress Notes for additional information about the patient's course of treatment  ED Arrival Information     Expected Arrival Acuity Means of Arrival Escorted By Service Admission Type    5/26/2021 5/26/2021 13:49 Urgent Ambulance [facility]) Surgery-General Urgent    Arrival Complaint    Thigh Abscess        Chief Complaint   Patient presents with    Medical Problem     surgery tx from [address]  5/26/2021 (3 hours)  [facility] Emergency Department  Leg abscess, sepsis, acute kidney injury  Transfer to [address] [place] [address] for higher level of care  Prior to transfer iv toradol, iv mso4, iv zofran , iv ns bolus, iv flagyl, iv dilaudid, iv vancomycin, iv azetreonam        Initial Presentation:     37year old male received [facility] for observation admission to evaluate and treat left leg pain at site of left groin mass excision 7 weeks ago  PMHX: HTN, DM, MI, HIDRADENITIS  Visiting nurse noted fever 105 4 with nausea and vomiting, dizziness and lightheadedness  [facility] at [facility] revewled LLE abscess vs seroma with cellulitis  Plan includes trend fever curve, iv antibiotics    I and d at bedside at 5-26 2: 52 pm- wound open with packing    5-26 549 pm   Patient with SBP 70s and temp 103  Lower extremity with erythema extending past markings with notable tenderness groin to ankle  Concern for necrotizing soft tissue infection  Anesthesia Start Date/Time: 05/26/21 1822   Procedure: DEBRIDEMENT LOWER EXTREMITY [facility] OUT) (Left Knee)   Anesthesia type: general   Diagnosis: Cutaneous abscess of groin [L02 214]   Pre-op diagnosis: Cutaneous abscess of groin [L02 214]     Procedure(s) (LRB):  DEBRIDEMENT LOWER EXTREMITY ([address] OUT) (Left)     Specimen(s):  ID Type Source Tests   A : left leg wound Wound Wound ANAEROBIC CULTURE AND GRAM STAIN, STAT GRAM STAIN, WOUND CULTURE   B : left leg wound #2 Wound Wound ANAEROBIC CULTURE AND GRAM STAIN, STAT GRAM STAIN, WOUND CULTURE     Operative Indications:  Cutaneous abscess of groin [L02 214]     Operative Findings:  No necrotizing soft tissue infection seen  Discolored fluid collection capsule which was debrided and sent for culture  Culture swabs also sent x2  1x betadine soaked kerlix placed     Complications:   None    Admit to critical care      Date: 5-27   Day 2: critical care   Patien thypotensive and  in significant uncontrolled pain, Map 50-60 with need for increased vasopressor overnight  WBC galo to 15 32  CVD day 1  Continue iv antibiotics, iv fluids, transfuse 1 unit prbc, increase dose of iv dilaudid prn    Venous duplex ordered to rule out dvt  lovenox initiated    ED Triage Vitals   05/26/21 1352 05/26/21 1352 05/26/21 1352 05/26/21 1352 05/26/21 1352   100 °F (37 8 °C) 78 20 (!) 111/42 96 %      Tympanic Monitor         Pain Score       3          05/27/21 (!) 168 kg (370 lb 9 5 oz)     Additional Vital Signs:      Date/Time  Temp  Pulse  Resp  BP  MAP   Arterial Line BP  MAP  SpO2    Nasal Cannula O2 Flow Rate (L/min)  O2 Device     05/27/21 0915  97 6 °F (36 4 °C)  78  --  --  --  118/54  74 mmHg  99 %    --  --     05/27/21 0904  --  78  16 129/66  --  --  --  --    --  --     05/27/21 0903  97 6 °F (36 4 °C)  --  --  --  --  --  --  --    --  --     05/27/21 0730  --  80  --  87/55Abnormal   78  110/46  66 mmHg  97 %    --  --     05/27/21 0415  --  78  --  --  --  110/46  64 mmHg  97 %    --  --     05/27/21 0400  99 3 °F (37 4 °C)  80  --  --  --  110/46  64 mmHg  97 %    --  --     05/27/21 0300  --  86  --  92/45Abnormal   64  104/44  64 mmHg  92 %    --  --     05/27/21 0100  --  88  --  91/57  70  86/42  58 mmHg  96 %    --  --     05/26/21 2115  --  86  --  82/55Abnormal   73  96/42  56 mmHg  94 %    --  --     05/26/21 2100  --  86  --  --  --  102/36  54 mmHg  92 %    4 L/min  Nasal cannula     05/26/21 2045  99 °F (37 2 °C)  88  20  --  --  84/30  46 mmHg  --    --  --     05/26/21 2015  --  88  23Abnormal   83/52Abnormal   --  104/44  60 mmHg  96 %    4 L/min  Nasal cannula     05/26/21 2000  --  98  19  103/60  --  104/48  62 mmHg  92 %    4 L/min  --     05/26/21 1949  99 6 °F (37 6 °C)  92  28Abnormal   123/49  Abnormal   --  --  --  97 %    --  Simple mask     05/26/21 1945  99 6 °F (37 6 °C)  96  28Abnormal   107/57  --  118/44  64 mmHg  94 %    3 L/min  Nasal cannula     05/26/21 1749  --  95  --  --  --  --  --  96 %    --  None (Room air)     05/26/21 1745  --  70  22  77/41Abnormal   52  --  --  80 %Abnormal     --  --     05/26/21 1726  103 5 °F (39 7 °C)Abnormal   --  --  --  --  --  --  --    --  --     05/26/21 1715  --  90  20  93/42Abnormal   55  --  --  99 %    --  --     05/26/21 1700  --  86  20  86/40Abnormal   52  --  --  99 %    --  --     05/26/21 1615  --  84  20  112/60  76  --  --  94 %    --  --     05/26/21 1524  --  82  --  100/55  --  --  --  91 %    --  None (Room air)       05/26 2007 05/27 0001 05/27 0517    pH, Arterial 7 361     7 310Low      7 326Low        pCO2, Arterial 37 4     44  5High      38 3       pO2, Arterial 70 4Low      76 7     100 5       HCO3, Arterial 20 7Low      21 9Low      19 6Low Respiratory Data      05/26 2000 05/26 2100 05/26 2100  Most Recent   O2 Device  Nasal c  Nasal c  Nasal c  O2 Flow Rate (L/min) (L/min)     6   Nasal Cannula O2 Flow Rate (L/min) (L/min) 4 4 4  4              Pertinent Labs/Diagnostic Test Results:     CT ABDOMEN AND PELVIS WITH IV CONTRAST    8-95-64 10 am [place] ed   INDICATION:   abdominal pain fever vomiting L mid thigh wound, painful leg  Patient has suspected COVID-19         1  Subcutaneous abscess within the left anterior upper thigh underlying superficial wound with reactive inguinal and external iliac lymphadenopathy      2  Previous subcutaneous abscess within the pannus has resolved though there is continued diffuse skin thickening with more focal thickening/collection within the skin of the pannus on the right    This has been present since November 2020 and is not   significantly changed in size        Results from last 7 days   Lab Units 05/26/21  0952 05/24/21  1353   SARS-COV-2  Negative Negative     Results from last 7 days   Lab Units 05/27/21  0516 05/27/21  0001 05/26/21 2006 05/26/21  1849 05/26/21  0942   WBC Thousand/uL 14 81* 15 32* 13 64*  --  15 20*   HEMOGLOBIN g/dL 7 4* 7 6* 7 5*  --  9 1*   I STAT HEMOGLOBIN g/dl  --   --   --  9 2*  --    HEMATOCRIT % 25 2* 25 8* 24 8*  --  29 1*   HEMATOCRIT, ISTAT %  --   --   --  27*  --    PLATELETS Thousands/uL 310 336 309  --  380   NEUTROS ABS Thousands/µL  --   --   --   --  13 17*   BANDS PCT %  --   --  41*  --   --          Results from last 7 days   Lab Units 05/27/21 0516 05/27/21  0001 05/26/21 2006 05/26/21  1849 05/26/21  0942   SODIUM mmol/L 135* 135* 134*  --  131*   POTASSIUM mmol/L 4 2 4 4 3 8  --  3 9   CHLORIDE mmol/L 103 104 103  --  95*   CO2 mmol/L 23 24 23  --  26   CO2, I-STAT mmol/L  --   --   --  26  --    ANION GAP mmol/L 9 7 8  --  10   BUN mg/dL 21 22 19  --  15   CREATININE mg/dL 1 44* 1 59* 1 71*  --  1 52*   EGFR ml/min/1 73sq m 60 54 49  --  56 CALCIUM mg/dL 7 5* 7 4* 7 3*  --  8 3   CALCIUM, IONIZED mmol/L 1 00*  --  0 97*  --   --    CALCIUM, IONIZED, ISTAT mmol/L  --   --   --  1 12  --    MAGNESIUM mg/dL 1 9  --  1 2*  --   --    PHOSPHORUS mg/dL 4 1  --  4 5  --   --      Results from last 7 days   Lab Units 05/26/21  0942   AST U/L 21   ALT U/L 22   ALK PHOS U/L 99   TOTAL PROTEIN g/dL 7 8   ALBUMIN g/dL 2 4*   TOTAL BILIRUBIN mg/dL 0 42     Results from last 7 days   Lab Units 05/27/21  0709 05/26/21  1952 05/26/21  1708   POC GLUCOSE mg/dl 150* 147* 135     Results from last 7 days   Lab Units 05/27/21  0516 05/27/21  0001 05/26/21 2006 05/26/21  0942   GLUCOSE RANDOM mg/dL 144* 124 137 175*             No results found for: BETA-HYDROXYBUTYRATE   Results from last 7 days   Lab Units 05/27/21  0517 05/27/21  0001 05/26/21 2007   PH ART  7 326* 7 310* 7 361   PCO2 ART mm Hg 38 3 44 5* 37 4   PO2 ART mm Hg 100 5 76 7 70 4*   HCO3 ART mmol/L 19 6* 21 9* 20 7*   BASE EXC ART mmol/L -5 9 -4 1 -4 3   O2 CONTENT ART mL/dL 11 1* 11 4* 11 4*   O2 HGB, ARTERIAL % 96 1 92 0* 91 4*   ABG SOURCE  Line, Arterial Line, Arterial Line, Arterial         Results from last 7 days   Lab Units 05/26/21  1849   PH, MAGUE I-STAT  7 320   PCO2, MAGUE ISTAT mm HG 48 3   PO2, MAGUE ISTAT mm HG 58 0*   HCO3, MAGUE ISTAT mmol/L 24 9   I STAT BASE EXC mmol/L -1   I STAT O2 SAT % 87*         Results from last 7 days   Lab Units 05/26/21  0942   TROPONIN I ng/mL <0 02         Results from last 7 days   Lab Units 05/26/21  0942   PROTIME seconds 15 1*   INR  1 20*   PTT seconds 36       Results from last 7 days   Lab Units 05/27/21 0516 05/27/21  0001 05/26/21 2006 05/26/21  1857 05/26/21  0942   LACTIC ACID mmol/L 0 8 0 7 1 1 1 9 1 3         Results from last 7 days   Lab Units 05/27/21  0308   CLARITY UA  Clear   COLOR UA  Dk Yellow   SPEC GRAV UA  >1 045*   PH UA  5 0   GLUCOSE UA mg/dl Negative   KETONES UA mg/dl Trace*   BLOOD UA  Moderate*   PROTEIN UA mg/dl 30 (1+)*   NITRITE UA  Negative   BILIRUBIN UA  Negative   UROBILINOGEN UA E U /dl 1 0   LEUKOCYTES UA  Negative   WBC UA /hpf 2-4   RBC UA /hpf None Seen   BACTERIA UA /hpf None Seen   EPITHELIAL CELLS WET PREP /hpf None Seen       Results from last 7 days   Lab Units 05/26/21  1909 05/26/21  0942 05/26/21  0935   BLOOD CULTURE   --  Received in Microbiology Lab  Culture in Progress  Received in Microbiology Lab  Culture in Progress     GRAM STAIN RESULT Wound  Rare Polys*  Rare Gram positive cocci in clusters*  --   --        ED Treatment:   Medication Administration from 05/26/2021 1349 to 05/26/2021 1756       Date/Time Order Dose Route Action     05/26/2021 1416 lidocaine (PF) (XYLOCAINE-MPF) 1 % injection 10 mL 10 mL Infiltration Given     05/26/2021 1447 HYDROmorphone (DILAUDID) injection 0 5 mg 0 5 mg Intravenous Given     05/26/2021 1458 ceFAZolin (ANCEF) IVPB (premix in dextrose) 2,000 mg 50 mL 2,000 mg Intravenous New Bag     05/26/2021 1600 sodium chloride 0 9 % infusion 100 mL/hr Intravenous New Bag     05/26/2021 1559 acetaminophen (TYLENOL) tablet 975 mg 975 mg Oral Given     05/26/2021 1748 HYDROmorphone (DILAUDID) injection 0 5 mg 0 5 mg Intravenous Given        Past Medical History:   Diagnosis    Coronary artery disease    Diabetes mellitus ([facility] )    Heart disease    CAD   s/p ptca with 1 stent 2012    Hidradenitis suppurativa    groin    Hyperlipidemia    Hypertension    MI (myocardial infarction) ([facility] )    " silent " M I  in the past    Morbid obesity with BMI of 50 0-59 9, adult ([facility] )    [facility]    Nicotine dependence    Obesity    Pilonidal cyst     Present on Admission:   Sepsis ([facility] )      Admitting Diagnosis:     Cutaneous abscess of groin [L02 214]  Thigh abscess [L02 419]    Age/Sex: 37 y o  male    Scheduled Medications:  acetaminophen, 975 mg, Oral, Q8H [address]  cefepime, 2,000 mg, Intravenous, Q12H  chlorhexidine, 15 mL, Mouth/Throat, Q12H SHAZIA  enoxaparin, 60 mg, Subcutaneous, Q12H [address]  gabapentin, 100 mg, Oral, TID  insulin lispro, 2-12 Units, Subcutaneous, TID AC  melatonin, 6 mg, Oral, HS  metroNIDAZOLE, 500 mg, Intravenous, Q8H  polyethylene glycol, 17 g, Oral, Daily  senna-docusate sodium, 2 tablet, Oral, BID  vancomycin, 2,000 mg, Intravenous, Once    And  vancomycin, 750 mg, Intravenous, Once  vancomycin, 750 mg, Intravenous, Q8H      Continuous IV Infusions:  norepinephrine, 1-30 mcg/min, Intravenous, Titrated      PRN Meds:  bisacodyl, 10 mg, Rectal, Daily PRN  HYDROmorphone, 1 mg, Intravenous, Q2H PRN  HYDROmorphone, 2 mg, Oral, Q4H PRN    Or  HYDROmorphone, 4 mg, Oral, Q4H PRN  methocarbamol, 500 mg, Oral, Q6H PRN  ondansetron, 4 mg, Intravenous, Q6H PRN        IP CONSULT TO CASE MANAGEMENT  IP CONSULT TO PHARMACY    Network Utilization Review Department  ATTENTION: Please call with any questions or concerns to [phone] and carefully listen to the prompts so that you are directed to the right person  All voicemails are confidential   Valerie De Oliveira all requests for admission clinical reviews, approved or denied determinations and any other requests to dedicated fax number below belonging to the campus where the patient is receiving treatment   List of dedicated fax numbers for the Facilities:  [address] DENIALS (Administrative/Medical Necessity) [phone]   [address] (Maternity/NICU/Pediatrics) [phone]   [address] [place] [address] [address] [place] [address] [address] [phone] [address] [facility] [phone]

## 2021-05-27 NOTE — PROGRESS NOTES
Vancomycin IV Pharmacy-to-Dose Consultation    Robert Carroll is a 37 y o  male who is currently receiving vancomycin IV with management by the Pharmacy Consult service  Relevant clinical data and objective / subjective history reviewed  Vancomycin Assessment:  Indication: skin-soft tissue infection  Status: critically ill  Micro:   5/26 wound cx: GPC in clusters  5/26 blood cx: in process  Procalcitonin: none ordered  Renal Function: serum creatinine trending down  Potential Nephrotoxicity Factors:  Medications: vasopressors  Patient-Factors: hypotension, renal dysfunction (improving  Days of Therapy: 2  Current Dose: 2500 mg IV q12h (1500 mg given yesterday)  Goal Trough: 15-20 (appropriate for most indications)   Goal AUC(24h): 400-600  Last Level: n/a  Pharmacokinetic Analysis: ke 0 0545, t1/2 12 7 hr      Vancomycin Plan:  New Dosing: change to 2750 mg (25 mg/kg AdjBW) IV x1 then 750 mg IV q8h   Predicted Trough / AUC: 18 3 / 541  Next Level: trough level 5/28 at 0730  Renal Function Monitoring: daily BMP and UOP assessment      Pharmacy will continue to follow closely for s/sx of nephrotoxicity, infusion reactions and appropriateness of therapy  BMP and CBC will be ordered per protocol  We will continue to follow the patient's culture results and clinical progress daily         Thank you,  Heidi Solomon, PharmD, [address] Pharmacist  [phone]

## 2021-05-27 NOTE — QUICK NOTE
Post-Op Check    Assessment: 37 y o  M s/p exploration of left lower extremity wound and debridement    Wound exploration negative for necrotizing infection  [email or web address], MAP 65-70    Plan:  NPO  [email or web address]  Ancef/flagyl  Serial exams of left lower extremity  Local wound care of left groin wound  Follow up blood cultures  PRN analgesia  DVT PPX  Rest of care per ICU    Subjective:  Patient reports some pain in left lower extremity but improving from earlier  NPO without nausea or vomiting  Denies weakness/numbness in LLE      Vitals:    05/26/21 2300   BP:    Pulse: 82   Resp:    Temp:    SpO2: 97%       PE:  Gen: NAD, AAOx3  CV: RRR  Pulm: no resp distress  Abd: Soft, non-distended, non-tender  Ext: L groin wound with dressing in situ, 2 incisions on L thigh packed with wet to dry dressings, no purulence or necrotic tissue    Sang Rodriguez MD  General Surgery, PGY1

## 2021-05-27 NOTE — PROGRESS NOTES
Progress Note - General Surgery  David Gonzalez 37 y o  male MRN: [id number]  [facility]: [id number]    Assessment:  41M w cellulitis and a L thigh fluid collection s/p bedside I&D, subsequent wound exploration in OR 5/26  Plan:  - keep NPO until plans for wound elucidated  - IVF, 1L bolus ordered since under-resuscitated overnight  - PRN analgesia, increased frequency of PRN dilaudid  - monitor erythema LLE  - will re-examine wound today, will need to be premedicated  - ancef/flagyl/clinda  - wean levo as tolerated for MAP > 65  - trend endpoints  - f/u cultures  - SQH/SCDs      Subjective/Objective   Subjective: overnight levo stayed relatively constant  Pain in his leg overall improved  Feeling less feverish/lightheaded    Objective:    Blood pressure 91/57, pulse 80, temperature 99 3 °F (37 4 °C), temperature source Oral, resp  rate 20, height 5' 7" (1 702 m), weight (!) 168 kg (370 lb 9 5 oz), SpO2 97 %  ,Body mass index is 58 04 kg/m²  I/O last 24 hours:   In: 5347 1 [I V :4951 2; IV Piggyback:395 8]  Out: 500 [Urine:450; Blood:50]    Invasive Devices     Central Venous Catheter Line            CVC Central Lines 05/26/21 Triple less than 1 day          Peripheral Intravenous Line            Peripheral IV 05/26/21 Medial;Right Antecubital less than 1 day    Peripheral IV 05/26/21 Right Antecubital less than 1 day    Peripheral IV 05/26/21 Right Hand less than 1 day          Arterial Line            Arterial Line 05/26/21 less than 1 day                Physical Exam:   NAD, alert and oriented x3  Normocephalic, atraumatic  MMM, EOMI, PERRLA  Norm resp effort on NC O2  RRR  Abd soft, NT/ND  No calf tenderness or peripheral edema  LLE with dressing cdi, mild blanchable erythema extending from thigh into calf, less tender on exam today  Motor/sensation intact in distal extremities  CN grossly intact  -rash/lesions      Lab, Imaging and other studies:  Lab Results   Component Value Date WBC 14 81 (H) 05/27/2021    HGB 7 4 (L) 05/27/2021    HCT 25 2 (L) 05/27/2021    MCV 83 05/27/2021     05/27/2021      Lab Results   Component Value Date    GLUCOSE 100 (H) 12/29/2016    CALCIUM 7 4 (L) 05/27/2021     12/29/2016    K 4 4 05/27/2021    CO2 24 05/27/2021     05/27/2021    BUN 22 05/27/2021    CREATININE 1 59 (H) 05/27/2021       VTE Pharmacologic Prophylaxis: Sequential compression device (Venodyne)   VTE Mechanical Prophylaxis: sequential compression device

## 2021-05-28 ENCOUNTER — APPOINTMENT (INPATIENT)
Dept: RADIOLOGY | Facility: HOSPITAL | Age: 41
DRG: 853 | End: 2021-05-28
Payer: COMMERCIAL

## 2021-05-28 ENCOUNTER — ANESTHESIA EVENT (INPATIENT)
Dept: PERIOP | Facility: HOSPITAL | Age: 41
DRG: 853 | End: 2021-05-28
Payer: COMMERCIAL

## 2021-05-28 ENCOUNTER — ANESTHESIA (INPATIENT)
Dept: PERIOP | Facility: HOSPITAL | Age: 41
DRG: 853 | End: 2021-05-28
Payer: COMMERCIAL

## 2021-05-28 PROBLEM — E11.40 DIABETIC NEUROPATHY (HCC): Status: ACTIVE | Noted: 2021-05-28

## 2021-05-28 PROBLEM — T88.4XXA DIFFICULT INTUBATION: Status: ACTIVE | Noted: 2021-05-28

## 2021-05-28 PROBLEM — R06.02 SHORTNESS OF BREATH: Status: ACTIVE | Noted: 2021-05-28

## 2021-05-28 LAB
ABO GROUP BLD BPU: NORMAL
ANION GAP SERPL CALCULATED.3IONS-SCNC: 5 MMOL/L (ref 4–13)
ATRIAL RATE: 106 BPM
ATRIAL RATE: 84 BPM
BASOPHILS # BLD AUTO: 0.03 THOUSANDS/ΜL (ref 0–0.1)
BASOPHILS NFR BLD AUTO: 0 % (ref 0–1)
BPU ID: NORMAL
BUN SERPL-MCNC: 25 MG/DL (ref 5–25)
CALCIUM SERPL-MCNC: 8 MG/DL (ref 8.3–10.1)
CHLORIDE SERPL-SCNC: 101 MMOL/L (ref 100–108)
CO2 SERPL-SCNC: 26 MMOL/L (ref 21–32)
CREAT SERPL-MCNC: 1.41 MG/DL (ref 0.6–1.3)
CROSSMATCH: NORMAL
EOSINOPHIL # BLD AUTO: 0.05 THOUSAND/ΜL (ref 0–0.61)
EOSINOPHIL NFR BLD AUTO: 0 % (ref 0–6)
ERYTHROCYTE [DISTWIDTH] IN BLOOD BY AUTOMATED COUNT: 15.1 % (ref 11.6–15.1)
ERYTHROCYTE [DISTWIDTH] IN BLOOD BY AUTOMATED COUNT: 15.3 % (ref 11.6–15.1)
GFR SERPL CREATININE-BSD FRML MDRD: 62 ML/MIN/1.73SQ M
GLUCOSE SERPL-MCNC: 158 MG/DL (ref 65–140)
GLUCOSE SERPL-MCNC: 158 MG/DL (ref 65–140)
HCT VFR BLD AUTO: 25.8 % (ref 36.5–49.3)
HCT VFR BLD AUTO: 26.8 % (ref 36.5–49.3)
HGB BLD-MCNC: 7.6 G/DL (ref 12–17)
HGB BLD-MCNC: 8 G/DL (ref 12–17)
IMM GRANULOCYTES # BLD AUTO: 0.11 THOUSAND/UL (ref 0–0.2)
IMM GRANULOCYTES NFR BLD AUTO: 1 % (ref 0–2)
LYMPHOCYTES # BLD AUTO: 1.03 THOUSANDS/ΜL (ref 0.6–4.47)
LYMPHOCYTES NFR BLD AUTO: 9 % (ref 14–44)
MAGNESIUM SERPL-MCNC: 2.1 MG/DL (ref 1.6–2.6)
MCH RBC QN AUTO: 24.8 PG (ref 26.8–34.3)
MCH RBC QN AUTO: 25.5 PG (ref 26.8–34.3)
MCHC RBC AUTO-ENTMCNC: 29.5 G/DL (ref 31.4–37.4)
MCHC RBC AUTO-ENTMCNC: 29.9 G/DL (ref 31.4–37.4)
MCV RBC AUTO: 84 FL (ref 82–98)
MCV RBC AUTO: 85 FL (ref 82–98)
MONOCYTES # BLD AUTO: 0.88 THOUSAND/ΜL (ref 0.17–1.22)
MONOCYTES NFR BLD AUTO: 8 % (ref 4–12)
NEUTROPHILS # BLD AUTO: 9.41 THOUSANDS/ΜL (ref 1.85–7.62)
NEUTS SEG NFR BLD AUTO: 82 % (ref 43–75)
NRBC BLD AUTO-RTO: 0 /100 WBCS
P AXIS: 50 DEGREES
P AXIS: 65 DEGREES
PHOSPHATE SERPL-MCNC: 2.7 MG/DL (ref 2.7–4.5)
PLATELET # BLD AUTO: 270 THOUSANDS/UL (ref 149–390)
PLATELET # BLD AUTO: 276 THOUSANDS/UL (ref 149–390)
PMV BLD AUTO: 9.4 FL (ref 8.9–12.7)
PMV BLD AUTO: 9.5 FL (ref 8.9–12.7)
POTASSIUM SERPL-SCNC: 4.2 MMOL/L (ref 3.5–5.3)
PR INTERVAL: 138 MS
PR INTERVAL: 154 MS
QRS AXIS: -14 DEGREES
QRS AXIS: -20 DEGREES
QRSD INTERVAL: 83 MS
QRSD INTERVAL: 92 MS
QT INTERVAL: 326 MS
QT INTERVAL: 342 MS
QTC INTERVAL: 405 MS
QTC INTERVAL: 433 MS
RBC # BLD AUTO: 3.06 MILLION/UL (ref 3.88–5.62)
RBC # BLD AUTO: 3.14 MILLION/UL (ref 3.88–5.62)
SODIUM SERPL-SCNC: 132 MMOL/L (ref 136–145)
T WAVE AXIS: 53 DEGREES
T WAVE AXIS: 60 DEGREES
UNIT DISPENSE STATUS: NORMAL
UNIT PRODUCT CODE: NORMAL
UNIT RH: NORMAL
VANCOMYCIN TROUGH SERPL-MCNC: 20.5 UG/ML (ref 10–20)
VENTRICULAR RATE: 106 BPM
VENTRICULAR RATE: 84 BPM
WBC # BLD AUTO: 10.62 THOUSAND/UL (ref 4.31–10.16)
WBC # BLD AUTO: 11.51 THOUSAND/UL (ref 4.31–10.16)

## 2021-05-28 PROCEDURE — 85025 COMPLETE CBC W/AUTO DIFF WBC: CPT | Performed by: PHYSICIAN ASSISTANT

## 2021-05-28 PROCEDURE — 82948 REAGENT STRIP/BLOOD GLUCOSE: CPT

## 2021-05-28 PROCEDURE — 71045 X-RAY EXAM CHEST 1 VIEW: CPT

## 2021-05-28 PROCEDURE — 97605 NEG PRS WND THER DME<=50SQCM: CPT | Performed by: SURGERY

## 2021-05-28 PROCEDURE — 80202 ASSAY OF VANCOMYCIN: CPT | Performed by: SURGERY

## 2021-05-28 PROCEDURE — 12005 RPR S/N/A/GEN/TRK12.6-20.0CM: CPT | Performed by: SURGERY

## 2021-05-28 PROCEDURE — 84100 ASSAY OF PHOSPHORUS: CPT | Performed by: SURGERY

## 2021-05-28 PROCEDURE — 85027 COMPLETE CBC AUTOMATED: CPT | Performed by: SURGERY

## 2021-05-28 PROCEDURE — 93010 ELECTROCARDIOGRAM REPORT: CPT | Performed by: INTERNAL MEDICINE

## 2021-05-28 PROCEDURE — 94760 N-INVAS EAR/PLS OXIMETRY 1: CPT

## 2021-05-28 PROCEDURE — 83735 ASSAY OF MAGNESIUM: CPT | Performed by: SURGERY

## 2021-05-28 PROCEDURE — 99291 CRITICAL CARE FIRST HOUR: CPT | Performed by: SURGERY

## 2021-05-28 PROCEDURE — 0YJD0ZZ INSPECTION OF LEFT UPPER LEG, OPEN APPROACH: ICD-10-PCS | Performed by: SURGERY

## 2021-05-28 PROCEDURE — P9016 RBC LEUKOCYTES REDUCED: HCPCS

## 2021-05-28 PROCEDURE — 80048 BASIC METABOLIC PNL TOTAL CA: CPT | Performed by: PHYSICIAN ASSISTANT

## 2021-05-28 PROCEDURE — 93971 EXTREMITY STUDY: CPT | Performed by: SURGERY

## 2021-05-28 PROCEDURE — NC001 PR NO CHARGE: Performed by: SURGERY

## 2021-05-28 RX ORDER — CEFAZOLIN SODIUM 2 G/50ML
2000 SOLUTION INTRAVENOUS EVERY 8 HOURS
Status: DISCONTINUED | OUTPATIENT
Start: 2021-05-28 | End: 2021-06-01

## 2021-05-28 RX ORDER — GLYCOPYRROLATE 0.2 MG/ML
0.2 INJECTION INTRAMUSCULAR; INTRAVENOUS EVERY 4 HOURS PRN
Status: DISCONTINUED | OUTPATIENT
Start: 2021-05-28 | End: 2021-06-01

## 2021-05-28 RX ORDER — CALCIUM GLUCONATE 20 MG/ML
1 INJECTION, SOLUTION INTRAVENOUS ONCE
Status: COMPLETED | OUTPATIENT
Start: 2021-05-28 | End: 2021-05-28

## 2021-05-28 RX ORDER — MAGNESIUM SULFATE HEPTAHYDRATE 40 MG/ML
2 INJECTION, SOLUTION INTRAVENOUS ONCE
Status: COMPLETED | OUTPATIENT
Start: 2021-05-28 | End: 2021-05-28

## 2021-05-28 RX ORDER — HALOPERIDOL 5 MG/ML
2 INJECTION INTRAMUSCULAR
Status: DISCONTINUED | OUTPATIENT
Start: 2021-05-28 | End: 2021-06-01

## 2021-05-28 RX ORDER — MAGNESIUM HYDROXIDE 1200 MG/15ML
LIQUID ORAL AS NEEDED
Status: DISCONTINUED | OUTPATIENT
Start: 2021-05-28 | End: 2021-05-28 | Stop reason: HOSPADM

## 2021-05-28 RX ORDER — CALCIUM CHLORIDE 100 MG/ML
1 INJECTION INTRAVENOUS; INTRAVENTRICULAR ONCE
Status: DISCONTINUED | OUTPATIENT
Start: 2021-05-28 | End: 2021-05-28

## 2021-05-28 RX ORDER — PROPOFOL 10 MG/ML
INJECTION, EMULSION INTRAVENOUS AS NEEDED
Status: DISCONTINUED | OUTPATIENT
Start: 2021-05-28 | End: 2021-05-28

## 2021-05-28 RX ORDER — VANCOMYCIN HYDROCHLORIDE 1 G/200ML
1000 INJECTION, SOLUTION INTRAVENOUS EVERY 12 HOURS
Status: DISCONTINUED | OUTPATIENT
Start: 2021-05-28 | End: 2021-05-28

## 2021-05-28 RX ORDER — KETAMINE HCL IN NACL, ISO-OSM 100MG/10ML
SYRINGE (ML) INJECTION AS NEEDED
Status: DISCONTINUED | OUTPATIENT
Start: 2021-05-28 | End: 2021-05-28

## 2021-05-28 RX ORDER — SUCCINYLCHOLINE/SOD CL,ISO/PF 100 MG/5ML
SYRINGE (ML) INTRAVENOUS AS NEEDED
Status: DISCONTINUED | OUTPATIENT
Start: 2021-05-28 | End: 2021-05-28

## 2021-05-28 RX ORDER — LORAZEPAM 2 MG/ML
1 INJECTION INTRAMUSCULAR
Status: DISCONTINUED | OUTPATIENT
Start: 2021-05-28 | End: 2021-06-01

## 2021-05-28 RX ADMIN — CEFAZOLIN SODIUM 2000 MG: 2 SOLUTION INTRAVENOUS at 21:01

## 2021-05-28 RX ADMIN — MELATONIN TAB 3 MG 6 MG: 3 TAB at 21:00

## 2021-05-28 RX ADMIN — DOCUSATE SODIUM AND SENNOSIDES 2 TABLET: 8.6; 5 TABLET ORAL at 18:27

## 2021-05-28 RX ADMIN — CALCIUM GLUCONATE 1 G: 20 INJECTION, SOLUTION INTRAVENOUS at 06:19

## 2021-05-28 RX ADMIN — METHOCARBAMOL 500 MG: 500 TABLET ORAL at 00:17

## 2021-05-28 RX ADMIN — VANCOMYCIN HYDROCHLORIDE 750 MG: 750 INJECTION, SOLUTION INTRAVENOUS at 07:40

## 2021-05-28 RX ADMIN — ENOXAPARIN SODIUM 60 MG: 60 INJECTION SUBCUTANEOUS at 08:41

## 2021-05-28 RX ADMIN — CHLORHEXIDINE GLUCONATE 15 ML: 1.2 SOLUTION ORAL at 08:41

## 2021-05-28 RX ADMIN — HYDROMORPHONE HYDROCHLORIDE 1 MG: 1 INJECTION, SOLUTION INTRAMUSCULAR; INTRAVENOUS; SUBCUTANEOUS at 09:17

## 2021-05-28 RX ADMIN — ACETAMINOPHEN 975 MG: 325 TABLET, FILM COATED ORAL at 05:51

## 2021-05-28 RX ADMIN — METRONIDAZOLE 500 MG: 500 INJECTION, SOLUTION INTRAVENOUS at 12:08

## 2021-05-28 RX ADMIN — ACETAMINOPHEN 975 MG: 325 TABLET, FILM COATED ORAL at 21:00

## 2021-05-28 RX ADMIN — HYDROMORPHONE HYDROCHLORIDE 1 MG: 1 INJECTION, SOLUTION INTRAMUSCULAR; INTRAVENOUS; SUBCUTANEOUS at 05:51

## 2021-05-28 RX ADMIN — DEXTROSE, SODIUM CHLORIDE, AND POTASSIUM CHLORIDE 75 ML/HR: 5; .45; .15 INJECTION INTRAVENOUS at 00:10

## 2021-05-28 RX ADMIN — HYDROMORPHONE HYDROCHLORIDE 4 MG: 4 TABLET ORAL at 23:14

## 2021-05-28 RX ADMIN — MAGNESIUM SULFATE HEPTAHYDRATE 2 G: 40 INJECTION, SOLUTION INTRAVENOUS at 07:32

## 2021-05-28 RX ADMIN — HYDROMORPHONE HYDROCHLORIDE 1 MG: 1 INJECTION, SOLUTION INTRAMUSCULAR; INTRAVENOUS; SUBCUTANEOUS at 21:00

## 2021-05-28 RX ADMIN — HYDROMORPHONE HYDROCHLORIDE 1 MG: 1 INJECTION, SOLUTION INTRAMUSCULAR; INTRAVENOUS; SUBCUTANEOUS at 18:47

## 2021-05-28 RX ADMIN — HYDROMORPHONE HYDROCHLORIDE 4 MG: 4 TABLET ORAL at 08:41

## 2021-05-28 RX ADMIN — GABAPENTIN 100 MG: 100 CAPSULE ORAL at 16:42

## 2021-05-28 RX ADMIN — INSULIN LISPRO 2 UNITS: 100 INJECTION, SOLUTION INTRAVENOUS; SUBCUTANEOUS at 18:27

## 2021-05-28 RX ADMIN — VANCOMYCIN HYDROCHLORIDE 750 MG: 750 INJECTION, SOLUTION INTRAVENOUS at 00:10

## 2021-05-28 RX ADMIN — METHOCARBAMOL 500 MG: 500 TABLET ORAL at 05:51

## 2021-05-28 RX ADMIN — CHLORHEXIDINE GLUCONATE 15 ML: 1.2 SOLUTION ORAL at 21:00

## 2021-05-28 RX ADMIN — HYDROMORPHONE HYDROCHLORIDE 1 MG: 1 INJECTION, SOLUTION INTRAMUSCULAR; INTRAVENOUS; SUBCUTANEOUS at 15:03

## 2021-05-28 RX ADMIN — HYDROMORPHONE HYDROCHLORIDE 4 MG: 4 TABLET ORAL at 04:48

## 2021-05-28 RX ADMIN — METRONIDAZOLE 500 MG: 500 INJECTION, SOLUTION INTRAVENOUS at 03:16

## 2021-05-28 RX ADMIN — METHOCARBAMOL 500 MG: 500 TABLET ORAL at 18:27

## 2021-05-28 RX ADMIN — Medication 20 MG: at 11:49

## 2021-05-28 RX ADMIN — GABAPENTIN 100 MG: 100 CAPSULE ORAL at 21:01

## 2021-05-28 RX ADMIN — Medication 200 MG: at 11:51

## 2021-05-28 RX ADMIN — CEFEPIME HYDROCHLORIDE 2000 MG: 2 INJECTION, POWDER, FOR SOLUTION INTRAVENOUS at 01:18

## 2021-05-28 RX ADMIN — KETAMINE HYDROCHLORIDE 0.1 MG/KG/HR: 50 INJECTION, SOLUTION INTRAMUSCULAR; INTRAVENOUS at 17:03

## 2021-05-28 RX ADMIN — PROPOFOL 300 MG: 10 INJECTION, EMULSION INTRAVENOUS at 11:50

## 2021-05-28 RX ADMIN — CEFEPIME HYDROCHLORIDE 2000 MG: 2 INJECTION, POWDER, FOR SOLUTION INTRAVENOUS at 09:17

## 2021-05-28 RX ADMIN — SODIUM PHOSPHATE, MONOBASIC, MONOHYDRATE 15 MMOL: 276; 142 INJECTION, SOLUTION INTRAVENOUS at 07:32

## 2021-05-28 RX ADMIN — HYDROMORPHONE HYDROCHLORIDE 4 MG: 4 TABLET ORAL at 16:42

## 2021-05-28 RX ADMIN — ENOXAPARIN SODIUM 60 MG: 60 INJECTION SUBCUTANEOUS at 21:00

## 2021-05-28 NOTE — PROGRESS NOTES
Progress Note - General Surgery   Charma Mode 37 y o  male MRN: [id number]  [facility]: [id number]    Assessment:  41M w cellulitis and a L thigh fluid collection s/p bedside I&D, subsequent wound exploration in OR 5/26  Plan:  NPO/IVF  OR for left thigh washout/debridment and closure  Vanc/cefepime/flagyl  F/u wound cultures- GPCs in pairs and clusters  F/u blood cultures  F/u LLE duplex reads  Elevate LLE  Prn pain control  DVT ppx    Subjective/Objective   Chief Complaint:     Subjective: Afebrile  Transfused 1U pRBC yesterday  Off pressors  Pain controlled  Risks, benefits, alternatives discussed and patient agreeable to go to the OR today  Objective:     Blood pressure 134/72, pulse 84, temperature 99 1 °F (37 3 °C), temperature source Oral, resp  rate 16, height 5' 7" (1 702 m), weight (!) 168 kg (370 lb 9 5 oz), SpO2 93 %  ,Body mass index is 58 04 kg/m²        Intake/Output Summary (Last 24 hours) at 5/28/2021 0554  Last data filed at 5/27/2021 2039  Gross per 24 hour   Intake 3178 98 ml   Output 1450 ml   Net 1728 98 ml       Invasive Devices     Central Venous Catheter Line            CVC Central Lines 05/26/21 Triple Right Internal jugular 1 day          Peripheral Intravenous Line            Peripheral IV 05/26/21 Medial;Right Antecubital 1 day    Peripheral IV 05/26/21 Right Antecubital 1 day    Peripheral IV 05/26/21 Right Hand 1 day                Physical Exam: NAD  Atraumatic/normocephalic  AAOX3  Normal mood and affect  Normal respiratory effort  Soft, non tender, ND  Skin warm/dry  Cap refil <2 sec  L thigh bandages c/d/i, left leg swollen

## 2021-05-28 NOTE — ANESTHESIA POSTPROCEDURE EVALUATION
Post-Op Assessment Note    CV Status:  Stable  Pain Score: 0    Pain management: adequate     Mental Status:  Alert and awake   Hydration Status:  Euvolemic   PONV Controlled:  Controlled   Airway Patency:  Patent      Post Op Vitals Reviewed: Yes      Staff: CRNA         No complications documented      BP   98/59   Temp      Pulse  76   Resp   26   SpO2   100 bipap

## 2021-05-28 NOTE — CONSULTS
This patient's vancomycin has been discontinued  Thank you for this consult; Pharmacy will sign off now      Suzanne Linton, PharmD

## 2021-05-28 NOTE — OP NOTE
OPERATIVE REPORT  PATIENT NAME: David Gonzalez    :  1980  MRN: [id number]  Pt Location: [facility] OR ROOM 06    SURGERY DATE: 2021    Surgeon(s) and Role:     * Nikky Pugh DO - Primary     * Natali Agarwal MD - Assisting    Preop Diagnosis:  Cutaneous abscess of groin [L02 214]    Post-Op Diagnosis Codes:     * Cutaneous abscess of groin [L02 214]    Procedure(s) (LRB): Washout left thigh wound and closure (Left)    Specimen(s):  * No specimens in log *    Estimated Blood Loss:   Minimal    Drains:  Open Drain Left Thigh (Active)   Number of days: 0       Open Drain Left Thigh (Active)   Number of days: 0       Anesthesia Type:   General    Operative Indications:  Cutaneous abscess of groin [L02 214]      Operative Findings:  Closure of two left thigh wounds over two penrose drains, skin closure only, 12cm and 86LK    Complications:   None    Procedure and Technique:  Patient was taken back to the operating room and identified both visually and by hospital identified arm band  Athrombic pumps were placed  The patient was placed supine/frog leg on the operating table and he was receiving around the clock antibiotics  Anesthesia was induced and an ETT was placed  Previous wound dressings were removed  The operative site was prepped and draped in the usual sterile fashion with betadine  A time out was called and all present were in agreement  The wounds were inspected  All muscle, soft tissue, and skin was viable and appropriately bleeding  The wounds were puls-a-vac'd  The granulating upper thigh wound was healthy in appearance  There was no purulence  Hemostasis was achieved with Bovie electrocautery  Skin of the two surgically created thigh wounds were closed with vertical mattress sutures with 2-0 nylon over two penrose drains  They measured 12cm and 13cm  They were covered in 4x4 and a trauma ABD pad  RF wonding was completed and did not find any retained sponges   Sponge and instrument counts were correct  Patient was woken from anesthesia and brought to the PACU in stable condition  Dr Sal Rivers was present for the entire procedure        Patient Disposition:  PACU     SIGNATURE: Danita Baxter MD  DATE: May 28, 2021  TIME: 12:21 PM

## 2021-05-28 NOTE — PROGRESS NOTES
Vancomycin IV Pharmacy-to-Dose Consultation    Yue Velazquez is a 37 y o  male who is currently receiving Vancomycin IV with management by the Pharmacy Consult service  Relevant clinical data and objective / subjective history reviewed  Vancomycin Assessment:  Indication: skin-soft tissue infection    Status: critically ill, leukocytosis  Micro:   5/26 Anaerobic culture: in process  5/26 Wound culture: rare GPC in clusters  5/26 Anaerobic culture: in process  5/26 Wound culture: rare GPC in pairs  5/26 Blood cultures x 2: no growth at 24 hours  Renal Function: CLOVIS improving, Scr 1 41 (peaked at 1 71, baseline 1); UOP 0 3 mL/kg/hr  Days of Therapy: 3  Current Dose: 750 mg IV q8h (last dose prior to level: 5/28 at [address] [facility] [facility]; last dose: 5/28 at 0740)  Goal Trough: 15-20 (appropriate for most indications)   Goal AUC(24h): 400-600  Last Level: 20 5 (5/28 at 0740) 7 5 hours after last dose  Pharmacokinetic Analysis: Extrapolated trough at 8 hours is therapeutic at 20    Vancomycin Plan:  New Dosing: given risk of accumulation and decreased UOP, change to 1000 mg IV q12h (next dose: 5/28 at 1800)  Predicted Trough / AUC: 16 / 515  Next Level: Trough 5/30 at 0530  Renal Function Monitoring: Daily BMP and [place] 19 will continue to follow closely for s/sx of nephrotoxicity, infusion reactions and appropriateness of therapy  BMP and CBC will be ordered per protocol  We will continue to follow the patients culture results and clinical progress daily         Benedict Dandy, PharmD, [address] Clinical Pharmacist  [phone]

## 2021-05-28 NOTE — ANESTHESIA PREPROCEDURE EVALUATION
Procedure:  Washout left thigh wound and closure (Left Thigh)    Relevant Problems   ANESTHESIA   (+) Difficult intubation ([place] day[place] functional reserve)      CARDIO   (+) CAD (coronary artery disease)   (+) HTN (hypertension)   (+) Hypertriglyceridemia   (+) Type 1 non-ST elevation myocardial infarction (NSTEMI) (HCC)      ENDO   (+) DM type 2, uncontrolled, with neuropathy (HCC)      GI/HEPATIC   (-) Gastroesophageal reflux disease      /RENAL (within normal limits)      HEMATOLOGY (within normal limits)      MUSCULOSKELETAL  Left lower etremity infection      NEURO/PSYCH   (+) Diabetic neuropathy (HCC)   (-) CVA (cerebral vascular accident) ([facility] [address] )   (-) Depression   (-) Seizures (HCC)   (-) TIA (transient ischemic attack)      PULMONARY   (+) Shortness of breath   (-) Asthma   (-) Smoking        Physical Exam    Airway    Mallampati score: III  TM Distance: >3 FB  Neck ROM: full     Dental   Comment: Multiple missing teeth in back, no loose as per patient,     Cardiovascular  Rhythm: regular, Rate: normal,     Pulmonary  Decreased breath sounds,     Other Findings        Anesthesia Plan  ASA Score- 4     Anesthesia Type- general with ASA Monitors  Additional Monitors:   Airway Plan: ETT  Plan Factors-Exercise tolerance (METS): >4 METS  Chart reviewed  EKG reviewed  Existing labs reviewed  Patient summary reviewed  Patient is not a current smoker  Obstructive sleep apnea risk education given perioperatively  There is medical exclusion for perioperative obstructive sleep apnea risk education  Induction- rapid sequence induction  Postoperative Plan- Plan for postoperative opioid use  Planned trial extubation    Informed Consent- Anesthetic plan and risks discussed with patient  I personally reviewed this patient with the CRNA  Discussed and agreed on the Anesthesia Plan with the BRADFORD Cat

## 2021-05-28 NOTE — PROGRESS NOTES
Post-Op Check - General Surgery   Simeon Mark 37 y o  male MRN: [id number]  Unit/Bed#: MICU 13 Encounter: [id number]    Assessment:  43yo male w/ cellulitis and L thigh fluid collection s/p OR washout of LLE wounds and closures, two penrose drains in place  Plan:  Markie/CHO diet  Hailey MARCIAL TLC  Wean BiPap  Continue Ancef  Elevate LLE  Continue current antiemetic and pain control regimen  L thigh wound - 2 penrose drains, 4x4, trauma abd    Subjective/Objective   Chief Complaint: Pain of LLE    Objective:     Blood pressure 90/53, pulse 72, temperature 98 5 °F (36 9 °C), resp  rate 22, height 5' 7" (1 702 m), weight (!) 172 kg (380 lb 1 2 oz), SpO2 99 %  ,Body mass index is 59 53 kg/m²  Intake/Output Summary (Last 24 hours) at 5/28/2021 1605  Last data filed at 5/28/2021 0945  Gross per 24 hour   Intake 580 ml   Output 450 ml   Net 130 ml       Invasive Devices     Central Venous Catheter Line            CVC Central Lines 05/26/21 Triple Right Internal jugular 1 day          Peripheral Intravenous Line            Peripheral IV 05/26/21 Medial;Right Antecubital 1 day    Peripheral IV 05/26/21 Right Hand 1 day          Drain            Open Drain Left Thigh less than 1 day    Open Drain Left Thigh less than 1 day                Physical Exam:    General: Pt is AAOx3, lying down in bed in NAD  HEENT: Pupils equal and reactive to light, normocephalic, no scleral icterus,  moist mucous membranes   Neck: Supple, non-tender, no JVD, ROM intact, no tracheal deviation   CV: RRR, no murmurs, gallops, rubs  S1 and S2  Resp: On BiPap  Lung sounds clear to auscultation B/L, normal respiratory effort no wheezes, rhonchi, rhales   Abd: Soft, with no tenderness, non-distended, non-tympanitic  Ext: LLE-with dressings clean, dry, and intact, moderate swelling of LLE, improved tenderness to palpation from pre-op  RLE-Warm with no cyanosis, no edema, no deformities  ROM intact   Vascular: 2+PT, DP bilaterally  Neuro: Sensation intact all 4 extremities  3+ strength all 4 extremities  Lab, Imaging and other studies:  I have personally reviewed pertinent lab results    , CBC:   Lab Results   Component Value Date    WBC 10 62 (H) 05/28/2021    HGB 8 0 (L) 05/28/2021    HCT 26 8 (L) 05/28/2021    MCV 85 05/28/2021     05/28/2021    MCH 25 5 (L) 05/28/2021    MCHC 29 9 (L) 05/28/2021    RDW 15 3 (H) 05/28/2021    MPV 9 4 05/28/2021    NRBC 0 05/28/2021   , CMP:   Lab Results   Component Value Date    SODIUM 132 (L) 05/28/2021    K 4 2 05/28/2021     05/28/2021    CO2 26 05/28/2021    BUN 25 05/28/2021    CREATININE 1 41 (H) 05/28/2021    CALCIUM 8 0 (L) 05/28/2021    EGFR 62 05/28/2021   , Coagulation: No results found for: PT, INR, APTT, Urinalysis: No results found for: COLORU, CLARITYU, SPECGRAV, PHUR, LEUKOCYTESUR, NITRITE, PROTEINUA, GLUCOSEU, KETONESU, BILIRUBINUR, BLOODU, Amylase: No results found for: AMYLASE, Lipase: No results found for: LIPASE  VTE Pharmacologic Prophylaxis: Enoxaparin (Lovenox)  VTE Mechanical Prophylaxis: sequential compression device    Gerardo Bonilla PA-C

## 2021-05-28 NOTE — PHYSICAL THERAPY NOTE
Physical Therapy Cancellation Note    PT orders received chart review completed  Pt is currently in the OR and not appropriate to participate in skilled PT at this time  PT will follow and eval as medically appropriate       05/28/21 1100   Note Type   Cancel Reasons Patient to operating room   Shadia Wright, PT

## 2021-05-28 NOTE — PROGRESS NOTES
Daily Progress Note - Critical Care   David Gonzalez 37 y o  male MRN: [id number]  Unit/Bed#: MICU 13 Encounter: [id number]        ----------------------------------------------------------------------------------------  HPI/24hr events: Patient overnight required to be placed on CPAP as he desats while sleeping  Now on 6L of O2  Has been off pressors since yesterday afternoon  Did receive 1 u pRBC      ---------------------------------------------------------------------------------------  SUBJECTIVE  Patient states that he is still having pain in his left leg although it is better  Denies any chest pain  Did have some shortness of breath when he was being rolled  Has not ambulated yet  NO N/V  No fever/chills  No abdominal pain  Denies any numbness or tingling  Review of Systems  Review of systems was reviewed and negative unless stated above in HPI/24-hour events   ---------------------------------------------------------------------------------------  Assessment and Plan:    Neuro:   · Diagnosis: No acute issues, post-surgical (prior to the procedure on 5/26) numbness of the proximal left thigh  ? Plan:   ? CAM-ICU  ? Goals RAAS 0  ? Sleep-Wake Cycle Regulation     · Diagnosis: Pain and Sedation  ? Plan:   ? Tylenol, dilaudid PO PRN, Dilaudid IV PRN, gabapentin 100 TID, robaxin, Zofran PRN  ? Cont bowel reg while on multimodal pain        CV:   · Diagnosis: Hypotension, history of CAD  ? Plan:   § Off levophed since yesterday afternoon  Maintaining MAPs  ? Lactate remains WNL  ? Trend endpoints  ? Continue to closely monitor hemodynamics  ? May require another unit of blood, Hgb 7 6 today from 8 from 7 4 after 1 u of pRBC  Keep >8 due to history of MI with stents   ? Restart brilinta/ASA whenever able per primary team        Pulm:  · Diagnosis: Hypoxia   ? Plan:   § CPAP at night PRN  § Cont NC to keep sats above 90%  ? Good pulm hygene  ? Encourage IS use        GI:   · Diagnosis: No acute issues  ?  Plan: ? Sennakot, miralax, bowel regimen        :   · Diagnosis: CLOVIS  ? Plan:   ? No lewis in place  ? UA grossly negative  ? Trend BMP  Creatinine slowly down trending 1 41 today  ? Gentle hydration when NPO  ? Strict I&Os        F/E/N:   · Plan:   · Fluids: Continue [email or web address] while NPO  · Electrolytes: Trend and replete as needed  · Nutrition: NPO for OR  Can have diet after         Heme/Onc:   · Diagnosis:   ? Plan:  ? Trend CBC, may require another unit of pRBC to keep above 8   ? Lovenox SubQ VTE prophylaxis         Endo: DM, morbid obesity  · Diagnosis:   ? Plan:  § Continue ISS   § Maintain BS between 140-180  Have been mostly within range      ID:   · Diagnosis: LLE abscess, cellulitis   ? Plan:   ? Follow-up pending cultures-> rare polys and GPC in clusters  ? Continue vanc, metronidazole, and cefepime  § Abx started on 5/26  ? Continue to closely monitor for signs of infection  ? Trend daily fever curve and WBC        MSK/Skin:   · Diagnosis: Hydradenitis suppurativa, s/p operative I&D, morbid obesity  ? Plan:   ? Continue to turn and reposition frequently  ? Continue pressure off-loading  ? Wound care per primary team  ? OR today for possible closure  ? F/u LEVD results  ? PT/OT      Disposition: Transfer to Stepdown Level 2  Code Status: Level 1 - Full Code  ---------------------------------------------------------------------------------------  ICU CORE MEASURES    Prophylaxis   VTE Pharmacologic Prophylaxis: Enoxaparin (Lovenox)  VTE Mechanical Prophylaxis: sequential compression device  Stress Ulcer Prophylaxis: Prophylaxis Not Indicated     ABCDE Protocol (if indicated)  Plan to perform spontaneous awakening trial today? Not applicable  Plan to perform spontaneous breathing trial today? Not applicable  Obvious barriers to extubation?  Not applicable  CAM-ICU: Negative    Invasive Devices Review  Invasive Devices     Central Venous Catheter Line            CVC Central Lines 05/26/21 Triple Right Internal jugular 1 day          Peripheral Intravenous Line            Peripheral IV 21 Medial;Right Antecubital 1 day    Peripheral IV 21 Right Antecubital 1 day    Peripheral IV 21 Right Hand 1 day              Can any invasive devices be discontinued today? Not applicable  ---------------------------------------------------------------------------------------  OBJECTIVE    Vitals   Vitals:    21 0500 21 0502 21 0503 21 0600   BP:       Pulse:       Resp:       Temp: 99 1 °F (37 3 °C)      TempSrc: Oral      SpO2:  (!) 87% 93%    Weight:    (!) 172 kg (380 lb 1 2 oz)   Height:         Temp (24hrs), Av 4 °F (36 9 °C), Min:97 6 °F (36 4 °C), Max:99 8 °F (37 7 °C)  Current: Temperature: 99 1 °F (37 3 °C)      Respiratory:  SpO2: SpO2: 93 %  Nasal Cannula O2 Flow Rate (L/min): 6 L/min    Invasive/non-invasive ventilation settings   Respiratory    Lab Data (Last 4 hours)    None         O2/Vent Data (Last 4 hours)    None                Physical Exam  General: AAOx3, NAD  HEENT: atraumatic, non-icteric sclera  Card: RRR, not tachycardic  Pulm: on 6L NC sating low 90s  Not tachypnic, able to speak full sentences  Abd: Soft, obese, non-tender, non distended  LE: Left thigh with dressings in placed  Sensation and strength intact bilaterally      Laboratory and Diagnostics:  Results from last 7 days   Lab Units 21  0443 21  1410 21  0516 21  0001 21  1849 21  0942   WBC Thousand/uL 11 51* 12 47* 14 81* 15 32* 13 64*  --  15 20*   HEMOGLOBIN g/dL 7 6* 8 0* 7 4* 7 6* 7 5*  --  9 1*   I STAT HEMOGLOBIN g/dl  --   --   --   --   --  9 2*  --    HEMATOCRIT % 25 8* 26 2* 25 2* 25 8* 24 8*  --  29 1*   HEMATOCRIT, ISTAT %  --   --   --   --   --  27*  --    PLATELETS Thousands/uL 270 286 310 336 309  --  380   NEUTROS PCT % 82*  --   --   --   --   --  87*   BANDS PCT %  --   --   --   --  41*  --   --    MONOS PCT % 8  --   --   -- --   --  5   MONO PCT %  --   --   --   --  3*  --   --      Results from last 7 days   Lab Units 05/28/21 0443 05/27/21 0516 05/27/21 0001 05/26/21 2006 05/26/21  1849 05/26/21  0942   SODIUM mmol/L 132* 135* 135* 134*  --  131*   POTASSIUM mmol/L 4 2 4 2 4 4 3 8  --  3 9   CHLORIDE mmol/L 101 103 104 103  --  95*   CO2 mmol/L 26 23 24 23  --  26   CO2, I-STAT mmol/L  --   --   --   --  26  --    ANION GAP mmol/L 5 9 7 8  --  10   BUN mg/dL 25 21 22 19  --  15   CREATININE mg/dL 1 41* 1 44* 1 59* 1 71*  --  1 52*   CALCIUM mg/dL 8 0* 7 5* 7 4* 7 3*  --  8 3   GLUCOSE RANDOM mg/dL 158* 144* 124 137  --  175*   ALT U/L  --   --   --   --   --  22   AST U/L  --   --   --   --   --  21   ALK PHOS U/L  --   --   --   --   --  99   ALBUMIN g/dL  --   --   --   --   --  2 4*   TOTAL BILIRUBIN mg/dL  --   --   --   --   --  0 42     Results from last 7 days   Lab Units 05/28/21 0443 05/27/21 0516 05/26/21 2006   MAGNESIUM mg/dL 2 1 1 9 1 2*   PHOSPHORUS mg/dL 2 7 4 1 4 5      Results from last 7 days   Lab Units 05/26/21  0942   INR  1 20*   PTT seconds 36      Results from last 7 days   Lab Units 05/26/21  0942   TROPONIN I ng/mL <0 02     Results from last 7 days   Lab Units 05/27/21 0516 05/27/21 0001 05/26/21 2006 05/26/21  1857 05/26/21  0942   LACTIC ACID mmol/L 0 8 0 7 1 1 1 9 1 3     ABG:  Results from last 7 days   Lab Units 05/27/21  0517   PH ART  7 326*   PCO2 ART mm Hg 38 3   PO2 ART mm Hg 100 5   HCO3 ART mmol/L 19 6*   BASE EXC ART mmol/L -5 9   ABG SOURCE  Line, Arterial     VBG:  Results from last 7 days   Lab Units 05/27/21  0517   ABG SOURCE  Line, Arterial           Micro  Results from last 7 days   Lab Units 05/26/21  1909 05/26/21  1844 05/26/21  0942 05/26/21  0935   BLOOD CULTURE   --   --  No Growth at 24 hrs  No Growth at 24 hrs     GRAM STAIN RESULT  Rare Polys*  Rare Gram positive cocci in clusters* Rare Polys*  Rare Gram positive cocci in pairs*  --   --        Intake and Output  I/O       05/26 0701 - 05/27 0700 05/27 0701 - 05/28 0700    P  O   150    I V  (mL/kg) 4951 2 (29 5) 1729 (10 1)    Blood  350    IV Piggyback 395 8 950    Total Intake(mL/kg) 5347 1 (31 8) 3179 (18 5)    Urine (mL/kg/hr) 450 1250 (0 3)    Blood 50     Total Output 500 1250    Net +4847 1 +1929                Height and Weights   Height: 5' 7" (170 2 cm)  IBW (Ideal Body Weight): 66 1 kg  Body mass index is 59 53 kg/m²  Weight (last 2 days)     Date/Time   Weight    05/28/21 0600   (!) 172 (380 07)    05/27/21 0300   (!) 168 (370 59)                Nutrition       Diet Orders   (From admission, onward)             Start     Ordered    05/28/21 0001  Diet NPO; Sips with meds  Diet effective midnight     Question Answer Comment   Diet Type NPO    NPO Except: Sips with meds    RD to adjust diet per protocol?  Yes        05/27/21 1344    05/27/21 1214  Dietary nutrition supplements  Once     Question Answer Comment   Select Supplement: [place]    Frequency Lunch, Dinner        05/27/21 1213                  Active Medications  Scheduled Meds:  Current Facility-Administered Medications   Medication Dose Route Frequency Provider Last Rate    acetaminophen  975 mg Oral Q8H Arpit Flanagan MD      bisacodyl  10 mg Rectal Daily PRN Gladys Richardson MD      calcium gluconate  1 g Intravenous Once Constanza Dickerson MD 1 g (05/28/21 4860)    cefepime  2,000 mg Intravenous Q8H Scout Krishnan PA-C 2,000 mg (05/28/21 0118)    chlorhexidine  15 mL Mouth/Throat Q12H [address] Gladsy Richardson MD      dextrose 5 % and sodium chloride 0 45 % with KCl 20 mEq/L  75 mL/hr Intravenous Continuous Constanza Dickerson MD 75 mL/hr (05/28/21 0010)    enoxaparin  60 mg Subcutaneous Q12H [address] Graciela Dasilva PA-C      gabapentin  100 mg Oral TID Graciela Dasilva PA-C      HYDROmorphone  1 mg Intravenous Q2H PRN Wilmon Crumbly, PA-C      HYDROmorphone  2 mg Oral Q4H PRN Wilmon Crumbly, PA-C      Or    HYDROmorphone  4 mg Oral Q4H PRN Lilian Guallpa PA-C      insulin lispro  2-12 Units Subcutaneous TID AC Yas Galvez MD      melatonin  6 mg Oral HS Marleen Myers MD      methocarbamol  500 mg Oral Q6H [address] Glenn Dickerson MD      metroNIDAZOLE  500 mg Intravenous Shanti Rowland  mg (05/28/21 0316)    norepinephrine  1-30 mcg/min Intravenous Titrated Yas Galvez MD Stopped (05/27/21 1306)    ondansetron  4 mg Intravenous Q6H PRN Yas Galvez MD      polyethylene glycol  17 g Oral Daily Marleen Myers MD      senna-docusate sodium  2 tablet Oral BID Marleen Myers MD      sodium phosphate  15 mmol Intravenous Once Glenn Dickerson MD      vancomycin  750 mg Intravenous Q8H Markathy Rey,  mg (05/28/21 0010)     Continuous Infusions:  dextrose 5 % and sodium chloride 0 45 % with KCl 20 mEq/L, 75 mL/hr, Last Rate: 75 mL/hr (05/28/21 0010)  norepinephrine, 1-30 mcg/min, Last Rate: Stopped (05/27/21 1306)      PRN Meds:   bisacodyl, 10 mg, Daily PRN  HYDROmorphone, 1 mg, Q2H PRN  HYDROmorphone, 2 mg, Q4H PRN    Or  HYDROmorphone, 4 mg, Q4H PRN  ondansetron, 4 mg, Q6H PRN        Allergies   Allergies   Allergen Reactions    Amoxicillin Hives     ---------------------------------------------------------------------------------------  Advance Directive and Living Will:      Power of :    POLST:    ---------------------------------------------------------------------------------------      Glenn Dickerson MD      Portions of the record may have been created with voice recognition software  Occasional wrong word or "sound a like" substitutions may have occurred due to the inherent limitations of voice recognition software    Read the chart carefully and recognize, using context, where substitutions have occurred

## 2021-05-28 NOTE — QUICK NOTE
S: Quan Lal is a 37 y o  male who returns to the ICU s/p L leg wound washout and closure  EBL minimal  Received 1 unit pRBCs pre-op  Per surgery note, no complications     O:    Vitals:  HR- 75  RR- 22  O2- 98%  BP- 86/53 (65)    General: Resting comfortably  HEENT: normocephalic, atraumatic  Cardiac: rrr  Pulm:  On BiPAP 18/10  Abd: protuberant, non-distended  MSK: equal arom of ue and le  Neuro: Moving all extremities, following commands  Skin: lines c/d/i, dressing over surgical site on L leg, minimal blood visualized at site    A/P:  L leg abscess s/p washout and closure  - Surgery continuing to follow  - Monitor surgical site for signs of bleeding or infection  - No further surgical plans  - Continue to monitor vitals post-op    Hypoxia  - BiPAP currently sating mid-90s  - Wean off BiPAP as tolerated  - CXR for evaluation of need for diuresis  - Continue to monitor oxygen saturation    Dispo: Continue critical care with plan for re-evaluation later today

## 2021-05-28 NOTE — NURSING NOTE
On 6LNC pt O2 sat dropping to low to mid 80s while sleeping  Dropped to mid 76s when pt removed O2 NC  Discussed with CC surg resident   Order received for CPAP at [facility]

## 2021-05-28 NOTE — OCCUPATIONAL THERAPY NOTE
OT CANCEL NOTE    OT orders received  Chart reviewed  Pt is currently off the floor for 'Washout left thigh wound and closure"  Will hold initial OT evaluation  Will continue to follow pt on caseload and see pt when medically stable and as clinically appropriate, post-op         05/28/21 1033   Note Type   Cancel Reasons Patient to operating room       Mere Bergman MS, OTR/L

## 2021-05-29 LAB
ABO GROUP BLD BPU: NORMAL
AEROSOL MASK ROOM AIR FIO2: 80 %
ANION GAP SERPL CALCULATED.3IONS-SCNC: 6 MMOL/L (ref 4–13)
ARTERIAL PATENCY WRIST A: YES
BACTERIA SPEC ANAEROBE CULT: NORMAL
BACTERIA SPEC ANAEROBE CULT: NORMAL
BACTERIA WND AEROBE CULT: ABNORMAL
BACTERIA WND AEROBE CULT: ABNORMAL
BASE EXCESS BLDA CALC-SCNC: 3.4 MMOL/L
BASOPHILS # BLD AUTO: 0.02 THOUSANDS/ΜL (ref 0–0.1)
BASOPHILS NFR BLD AUTO: 0 % (ref 0–1)
BPU ID: NORMAL
BUN SERPL-MCNC: 18 MG/DL (ref 5–25)
CALCIUM SERPL-MCNC: 8.6 MG/DL (ref 8.3–10.1)
CHLORIDE SERPL-SCNC: 104 MMOL/L (ref 100–108)
CO2 SERPL-SCNC: 25 MMOL/L (ref 21–32)
CREAT SERPL-MCNC: 0.98 MG/DL (ref 0.6–1.3)
CROSSMATCH: NORMAL
EOSINOPHIL # BLD AUTO: 0.09 THOUSAND/ΜL (ref 0–0.61)
EOSINOPHIL NFR BLD AUTO: 1 % (ref 0–6)
ERYTHROCYTE [DISTWIDTH] IN BLOOD BY AUTOMATED COUNT: 15.3 % (ref 11.6–15.1)
GFR SERPL CREATININE-BSD FRML MDRD: 96 ML/MIN/1.73SQ M
GLUCOSE SERPL-MCNC: 145 MG/DL (ref 65–140)
GLUCOSE SERPL-MCNC: 200 MG/DL (ref 65–140)
GRAM STN SPEC: ABNORMAL
HCO3 BLDA-SCNC: 29.3 MMOL/L (ref 22–28)
HCT VFR BLD AUTO: 27.7 % (ref 36.5–49.3)
HGB BLD-MCNC: 8.4 G/DL (ref 12–17)
IMM GRANULOCYTES # BLD AUTO: 0.2 THOUSAND/UL (ref 0–0.2)
IMM GRANULOCYTES NFR BLD AUTO: 2 % (ref 0–2)
LYMPHOCYTES # BLD AUTO: 0.84 THOUSANDS/ΜL (ref 0.6–4.47)
LYMPHOCYTES NFR BLD AUTO: 7 % (ref 14–44)
MCH RBC QN AUTO: 25.6 PG (ref 26.8–34.3)
MCHC RBC AUTO-ENTMCNC: 30.3 G/DL (ref 31.4–37.4)
MCV RBC AUTO: 85 FL (ref 82–98)
MONOCYTES # BLD AUTO: 0.81 THOUSAND/ΜL (ref 0.17–1.22)
MONOCYTES NFR BLD AUTO: 7 % (ref 4–12)
NEUTROPHILS # BLD AUTO: 9.55 THOUSANDS/ΜL (ref 1.85–7.62)
NEUTS SEG NFR BLD AUTO: 83 % (ref 43–75)
NON VENT TYPE-AEROSOL MASK: ABNORMAL
NRBC BLD AUTO-RTO: 0 /100 WBCS
O2 CT BLDA-SCNC: 12.6 ML/DL (ref 16–23)
OXYHGB MFR BLDA: 94.3 % (ref 94–97)
PCO2 BLDA: 51.1 MM HG (ref 36–44)
PH BLDA: 7.38 [PH] (ref 7.35–7.45)
PHOSPHATE SERPL-MCNC: 2.7 MG/DL (ref 2.7–4.5)
PLATELET # BLD AUTO: 319 THOUSANDS/UL (ref 149–390)
PMV BLD AUTO: 9.6 FL (ref 8.9–12.7)
PO2 BLDA: 80.6 MM HG (ref 75–129)
POTASSIUM SERPL-SCNC: 4.3 MMOL/L (ref 3.5–5.3)
RBC # BLD AUTO: 3.28 MILLION/UL (ref 3.88–5.62)
SODIUM SERPL-SCNC: 135 MMOL/L (ref 136–145)
SPECIMEN SOURCE: ABNORMAL
UNIT DISPENSE STATUS: NORMAL
UNIT PRODUCT CODE: NORMAL
UNIT RH: NORMAL
WBC # BLD AUTO: 11.51 THOUSAND/UL (ref 4.31–10.16)

## 2021-05-29 PROCEDURE — 80048 BASIC METABOLIC PNL TOTAL CA: CPT | Performed by: STUDENT IN AN ORGANIZED HEALTH CARE EDUCATION/TRAINING PROGRAM

## 2021-05-29 PROCEDURE — 82948 REAGENT STRIP/BLOOD GLUCOSE: CPT

## 2021-05-29 PROCEDURE — 85025 COMPLETE CBC W/AUTO DIFF WBC: CPT | Performed by: STUDENT IN AN ORGANIZED HEALTH CARE EDUCATION/TRAINING PROGRAM

## 2021-05-29 PROCEDURE — 84100 ASSAY OF PHOSPHORUS: CPT | Performed by: STUDENT IN AN ORGANIZED HEALTH CARE EDUCATION/TRAINING PROGRAM

## 2021-05-29 PROCEDURE — 99291 CRITICAL CARE FIRST HOUR: CPT | Performed by: PHYSICIAN ASSISTANT

## 2021-05-29 PROCEDURE — 82805 BLOOD GASES W/O2 SATURATION: CPT | Performed by: STUDENT IN AN ORGANIZED HEALTH CARE EDUCATION/TRAINING PROGRAM

## 2021-05-29 PROCEDURE — 94003 VENT MGMT INPAT SUBQ DAY: CPT

## 2021-05-29 PROCEDURE — 36600 WITHDRAWAL OF ARTERIAL BLOOD: CPT

## 2021-05-29 PROCEDURE — NC001 PR NO CHARGE: Performed by: SURGERY

## 2021-05-29 PROCEDURE — 94760 N-INVAS EAR/PLS OXIMETRY 1: CPT

## 2021-05-29 RX ORDER — ECHINACEA PURPUREA EXTRACT 125 MG
2 TABLET ORAL
Status: DISCONTINUED | OUTPATIENT
Start: 2021-05-29 | End: 2021-06-06 | Stop reason: HOSPADM

## 2021-05-29 RX ORDER — GABAPENTIN 300 MG/1
300 CAPSULE ORAL 3 TIMES DAILY
Status: DISCONTINUED | OUTPATIENT
Start: 2021-05-29 | End: 2021-06-01

## 2021-05-29 RX ORDER — FUROSEMIDE 10 MG/ML
20 INJECTION INTRAMUSCULAR; INTRAVENOUS ONCE
Status: COMPLETED | OUTPATIENT
Start: 2021-05-29 | End: 2021-05-29

## 2021-05-29 RX ORDER — METHOCARBAMOL 750 MG/1
750 TABLET, FILM COATED ORAL EVERY 6 HOURS SCHEDULED
Status: DISCONTINUED | OUTPATIENT
Start: 2021-05-29 | End: 2021-06-02

## 2021-05-29 RX ORDER — ASPIRIN 81 MG/1
81 TABLET ORAL DAILY
Status: DISCONTINUED | OUTPATIENT
Start: 2021-05-29 | End: 2021-06-06 | Stop reason: HOSPADM

## 2021-05-29 RX ORDER — CARVEDILOL 6.25 MG/1
6.25 TABLET ORAL 2 TIMES DAILY WITH MEALS
Status: DISCONTINUED | OUTPATIENT
Start: 2021-05-29 | End: 2021-06-06 | Stop reason: HOSPADM

## 2021-05-29 RX ADMIN — GABAPENTIN 300 MG: 300 CAPSULE ORAL at 21:45

## 2021-05-29 RX ADMIN — METHOCARBAMOL 500 MG: 500 TABLET ORAL at 05:58

## 2021-05-29 RX ADMIN — KETAMINE HYDROCHLORIDE 0.1 MG/KG/HR: 50 INJECTION, SOLUTION INTRAMUSCULAR; INTRAVENOUS at 08:17

## 2021-05-29 RX ADMIN — HYDROMORPHONE HYDROCHLORIDE 1 MG: 1 INJECTION, SOLUTION INTRAMUSCULAR; INTRAVENOUS; SUBCUTANEOUS at 18:49

## 2021-05-29 RX ADMIN — ACETAMINOPHEN 975 MG: 325 TABLET, FILM COATED ORAL at 05:57

## 2021-05-29 RX ADMIN — INSULIN LISPRO 4 UNITS: 100 INJECTION, SOLUTION INTRAVENOUS; SUBCUTANEOUS at 12:20

## 2021-05-29 RX ADMIN — METHOCARBAMOL TABLETS 750 MG: 750 TABLET, COATED ORAL at 11:21

## 2021-05-29 RX ADMIN — CEFAZOLIN SODIUM 2000 MG: 2 SOLUTION INTRAVENOUS at 05:59

## 2021-05-29 RX ADMIN — INSULIN LISPRO 2 UNITS: 100 INJECTION, SOLUTION INTRAVENOUS; SUBCUTANEOUS at 06:15

## 2021-05-29 RX ADMIN — ENOXAPARIN SODIUM 60 MG: 60 INJECTION SUBCUTANEOUS at 08:31

## 2021-05-29 RX ADMIN — CEFAZOLIN SODIUM 2000 MG: 2 SOLUTION INTRAVENOUS at 12:19

## 2021-05-29 RX ADMIN — GABAPENTIN 300 MG: 300 CAPSULE ORAL at 08:31

## 2021-05-29 RX ADMIN — HYDROMORPHONE HYDROCHLORIDE 4 MG: 4 TABLET ORAL at 05:58

## 2021-05-29 RX ADMIN — HYDROMORPHONE HYDROCHLORIDE 4 MG: 4 TABLET ORAL at 15:07

## 2021-05-29 RX ADMIN — DOCUSATE SODIUM AND SENNOSIDES 2 TABLET: 8.6; 5 TABLET ORAL at 08:31

## 2021-05-29 RX ADMIN — HYDROMORPHONE HYDROCHLORIDE 4 MG: 4 TABLET ORAL at 11:20

## 2021-05-29 RX ADMIN — HYDROMORPHONE HYDROCHLORIDE 1 MG: 1 INJECTION, SOLUTION INTRAMUSCULAR; INTRAVENOUS; SUBCUTANEOUS at 02:00

## 2021-05-29 RX ADMIN — CEFAZOLIN SODIUM 2000 MG: 2 SOLUTION INTRAVENOUS at 21:47

## 2021-05-29 RX ADMIN — HYDROMORPHONE HYDROCHLORIDE 1 MG: 1 INJECTION, SOLUTION INTRAMUSCULAR; INTRAVENOUS; SUBCUTANEOUS at 08:18

## 2021-05-29 RX ADMIN — HYDROMORPHONE HYDROCHLORIDE 1 MG: 1 INJECTION, SOLUTION INTRAMUSCULAR; INTRAVENOUS; SUBCUTANEOUS at 21:44

## 2021-05-29 RX ADMIN — Medication 2 SPRAY: at 21:52

## 2021-05-29 RX ADMIN — FUROSEMIDE 20 MG: 10 INJECTION, SOLUTION INTRAMUSCULAR; INTRAVENOUS at 10:35

## 2021-05-29 RX ADMIN — GABAPENTIN 300 MG: 300 CAPSULE ORAL at 15:07

## 2021-05-29 RX ADMIN — HYDROMORPHONE HYDROCHLORIDE 1 MG: 1 INJECTION, SOLUTION INTRAMUSCULAR; INTRAVENOUS; SUBCUTANEOUS at 12:37

## 2021-05-29 RX ADMIN — HYDROMORPHONE HYDROCHLORIDE 4 MG: 4 TABLET ORAL at 19:49

## 2021-05-29 RX ADMIN — HYDROMORPHONE HYDROCHLORIDE 1 MG: 1 INJECTION, SOLUTION INTRAMUSCULAR; INTRAVENOUS; SUBCUTANEOUS at 16:43

## 2021-05-29 RX ADMIN — ASPIRIN 81 MG: 81 TABLET, COATED ORAL at 10:35

## 2021-05-29 RX ADMIN — ENOXAPARIN SODIUM 60 MG: 60 INJECTION SUBCUTANEOUS at 21:45

## 2021-05-29 RX ADMIN — DOCUSATE SODIUM AND SENNOSIDES 2 TABLET: 8.6; 5 TABLET ORAL at 19:50

## 2021-05-29 RX ADMIN — ACETAMINOPHEN 975 MG: 325 TABLET, FILM COATED ORAL at 15:07

## 2021-05-29 RX ADMIN — ACETAMINOPHEN 975 MG: 325 TABLET, FILM COATED ORAL at 21:44

## 2021-05-29 RX ADMIN — MELATONIN TAB 3 MG 6 MG: 3 TAB at 21:45

## 2021-05-29 RX ADMIN — METHOCARBAMOL TABLETS 750 MG: 750 TABLET, COATED ORAL at 19:50

## 2021-05-29 RX ADMIN — POLYETHYLENE GLYCOL 3350 17 G: 17 POWDER, FOR SOLUTION ORAL at 08:31

## 2021-05-29 RX ADMIN — METHOCARBAMOL 500 MG: 500 TABLET ORAL at 01:25

## 2021-05-29 RX ADMIN — CARVEDILOL 6.25 MG: 6.25 TABLET, FILM COATED ORAL at 18:13

## 2021-05-29 RX ADMIN — TICAGRELOR 90 MG: 90 TABLET ORAL at 10:47

## 2021-05-29 NOTE — PROGRESS NOTES
Progress Note - General Surgery   Julia Bojorquez 37 y o  male MRN: [id number]  [facility]: [id number]    Assessment:  41M w cellulitis and a L thigh fluid collection s/p bedside I&D, subsequent wound exploration in OR 5/26 and 5/28 washout/closure over penrose drains    Plan:  Diet as tolerated  Daily dressing change over wound/2 penroses  Ancef   pulm toilet/wean O2  Elevate LLE  Prn pain control- on a ketamine gtt  PT/OT  DVT ppx    Subjective/Objective   Chief Complaint:     Subjective: Afebrile  On Midflow NC 11L  Having pain which is uncontrolled  No N/V  Objective:     Blood pressure 137/68, pulse 98, temperature 98 °F (36 7 °C), temperature source Oral, resp  rate 22, height 5' 7" (1 702 m), weight (!) 172 kg (380 lb 1 2 oz), SpO2 (!) 87 %  ,Body mass index is 59 53 kg/m²        Intake/Output Summary (Last 24 hours) at 5/29/2021 0603  Last data filed at 5/29/2021 0246  Gross per 24 hour   Intake 1003 94 ml   Output 2100 ml   Net -1096 06 ml       Invasive Devices     Central Venous Catheter Line            CVC Central Lines 05/26/21 Triple Right Internal jugular 2 days          Peripheral Intravenous Line            Peripheral IV 05/26/21 Medial;Right Antecubital 2 days    Peripheral IV 05/26/21 Right Hand 2 days          Drain            Open Drain Left Thigh less than 1 day    Open Drain Left Thigh less than 1 day                Physical Exam: NAD  Atraumatic/normocephalic  AAOX3  Normal mood and affect  Normal respiratory effort  Soft, non tender, ND  Skin warm/dry  Cap refil <2 sec  LLE swollen to foot  Thigh incisions c/d/i

## 2021-05-29 NOTE — PROGRESS NOTES
Daily Progress Note - [address] [address] JESSIKA Frankel 37 y o  male MRN: [id number]  Unit/Bed#: MICU 13 Encounter: [id number]        ----------------------------------------------------------------------------------------  HPI/24hr events: OR yesterday for washout and lcosure of L thigh wound  Extubated post operatively but required Bipap in PACU  Hypoxia overnight requiring midflow at 11L  This morning, persistent hypoxia requiring Bipap  Ongoing pain, started on ketamine drip yesterday   ---------------------------------------------------------------------------------------  SUBJECTIVE  "No one is listening to me" - complains of L leg pain and immobility  Denies use of CPAP at home and states he's worried no one is addressing his "breathing problems"    Review of Systems  Review of systems was reviewed and negative unless stated above in HPI/24-hour events   ---------------------------------------------------------------------------------------  Assessment and Plan:    Neuro:    Acute pain  o Continuous infusions  - Ketamine 0 1 mg/kg/hr  o Scheduled meds  - Tylenol 975mg Q8  - Gabapentin 100mg TID - increase to 300mg   - Robaxin 500mg Q6 hours - increase to 750mg  o Prn meds  - Dilaudid 2mg Q4 prn moderate pain (0 doses/24 hours)  - Dilaudid 4mg Q4 prn severe pain (4 doses/24 hours)  - Dilaudid 1mg Q2 prn breakthrough pain (5 doses/24 hours)  o Consider APS consult   Daily CAM-ICU, delirium precautions  Regulate sleep/wake cycle   o Melatonin 6mg qhs      CV:    CAD w/ cardiac stents  o Restart ASA/Brilinta when able  Restart home statin today   HTN  o On Norvasc 10mg daily, carvedilol 6 25mg BID, and lisinopril 10mg daily at home  Consider restarting carvedilol today  Pulm:   Acute hypoxic respiratory insufficiency  o Extubated post-operatively, escalating O2 requirements overnight  Bipap qhs and prn  Goal SpO2 > 92%  Encourage IS, pulmonary toilet   Out of bed to chair today to optimize respiratory status  GI:    Bowel regimen  o Senna, miralax      :    CLOVIS  o Improved  Trend I/Os  F/E/N:    No continuous fluids   Replete electrolytes as needed for goal Mag > 2 0, Phos >3 0, K >4 0   Diabetic diet      Heme/Onc:    Lovenox/SCDs for ppx      Endo:    DM2  o SSI algorithm 4  Home metformin on hold  - Goal -180      ID:    LLE abscess, cellulitis  o S/p wound exploration , closure   Penrose drains in place, management per general surgery  Abx narrowed to ancef for beta hemolytic strep  Trend fever curve/white count      MSK/Skin:    LLE abscess/cellulitis  o Local wound care  Appreciate general surgery recommendations  o PT/OT  o Out of bed to chair      Disposition: Continue Critical Care   Code Status: Level 1 - Full Code  ---------------------------------------------------------------------------------------  ICU CORE MEASURES    Prophylaxis   VTE Pharmacologic Prophylaxis: Enoxaparin (Lovenox)  VTE Mechanical Prophylaxis: sequential compression device  Stress Ulcer Prophylaxis: Prophylaxis Not Indicated       Invasive Devices Review  Invasive Devices     Central Venous Catheter Line            CVC Central Lines 21 Triple Right Internal jugular 2 days          Peripheral Intravenous Line            Peripheral IV 21 Medial;Right Antecubital 2 days    Peripheral IV 21 Right Hand 2 days          Drain            Open Drain Left Thigh less than 1 day    Open Drain Left Thigh less than 1 day              Can any invasive devices be discontinued today?  No  ---------------------------------------------------------------------------------------  OBJECTIVE    Vitals   Vitals:    21 0300 21 0400 21 0500 21 0600   BP: 137/68 159/74 (!) 173/86 127/82   Pulse: 98 86 86 86   Resp:       Temp:  98 °F (36 7 °C)     TempSrc:  Oral     SpO2: (!) 87% (!) 88% 93% 91%   Weight:    (!) 173 kg (382 lb 0 9 oz)   Height:         Temp (24hrs), Av 2 °F (36 8 °C), Min:98 °F (36 7 °C), Max:98 5 °F (36 9 °C)  Current: Temperature: 98 °F (36 7 °C)  HR: 87  BP: 127//2  RR: 18  SpO2: 91    Respiratory:  SpO2: SpO2: 91 %  Midflow 11L    Invasive/non-invasive ventilation settings   Respiratory    Lab Data (Last 4 hours)    None         O2/Vent Data (Last 4 hours)    None                Physical Exam  Vitals signs and nursing note reviewed  Constitutional:       General: He is not in acute distress  Appearance: He is morbidly obese  He is not ill-appearing  Interventions: Nasal cannula in place  HENT:      Head: Normocephalic and atraumatic  Eyes:      Pupils: Pupils are equal, round, and reactive to light  Neck:      Musculoskeletal: Neck supple  Cardiovascular:      Rate and Rhythm: Normal rate and regular rhythm  Heart sounds: Normal heart sounds  Heart sounds not distant  No murmur  No friction rub  No gallop  Pulmonary:      Effort: No tachypnea or accessory muscle usage  Breath sounds: Decreased breath sounds present  No wheezing, rhonchi or rales  Abdominal:      General: There is no distension  Palpations: Abdomen is soft  Tenderness: There is no abdominal tenderness  Musculoskeletal:      Right lower leg: No edema  Left lower le+ Pitting Edema present  Comments: L leg w/ dressing in place  LLE w/ swelling, mild erythema   Skin:     General: Skin is warm and dry  Neurological:      Mental Status: He is alert and oriented to person, place, and time           Laboratory and Diagnostics:  Results from last 7 days   Lab Units 21  0609 21  1142 21  0443 21  1410 21  0516 21  0001 21  0942   WBC Thousand/uL 11 51* 10 62* 11 51* 12 47* 14 81* 15 32* 13 64*  --  15 20*   HEMOGLOBIN g/dL 8 4* 8 0* 7 6* 8 0* 7 4* 7 6* 7 5*  --  9 1*   I STAT HEMOGLOBIN   --   --   --   --   --   --   --    < >  --    HEMATOCRIT % 27 7* 26 8* 25 8* 26 2* 25 2* 25 8* 24 8*  -- 29 1*   HEMATOCRIT, ISTAT   --   --   --   --   --   --   --    < >  --    PLATELETS Thousands/uL 319 276 270 286 310 336 309  --  380   NEUTROS PCT % 83*  --  82*  --   --   --   --   --  87*   BANDS PCT %  --   --   --   --   --   --  41*  --   --    MONOS PCT % 7  --  8  --   --   --   --   --  5   MONO PCT %  --   --   --   --   --   --  3*  --   --     < > = values in this interval not displayed       Results from last 7 days   Lab Units 05/29/21 0611 05/28/21 0443 05/27/21 0516 05/27/21 0001 05/26/21 2006 05/26/21  1849 05/26/21  0942   SODIUM mmol/L 135* 132* 135* 135* 134*  --  131*   POTASSIUM mmol/L 4 3 4 2 4 2 4 4 3 8  --  3 9   CHLORIDE mmol/L 104 101 103 104 103  --  95*   CO2 mmol/L 25 26 23 24 23  --  26   CO2, I-STAT mmol/L  --   --   --   --   --  26  --    ANION GAP mmol/L 6 5 9 7 8  --  10   BUN mg/dL 18 25 21 22 19  --  15   CREATININE mg/dL 0 98 1 41* 1 44* 1 59* 1 71*  --  1 52*   CALCIUM mg/dL 8 6 8 0* 7 5* 7 4* 7 3*  --  8 3   GLUCOSE RANDOM mg/dL 145* 158* 144* 124 137  --  175*   ALT U/L  --   --   --   --   --   --  22   AST U/L  --   --   --   --   --   --  21   ALK PHOS U/L  --   --   --   --   --   --  99   ALBUMIN g/dL  --   --   --   --   --   --  2 4*   TOTAL BILIRUBIN mg/dL  --   --   --   --   --   --  0 42     Results from last 7 days   Lab Units 05/29/21 0611 05/28/21 0443 05/27/21 0516 05/26/21 2006   MAGNESIUM mg/dL  --  2 1 1 9 1 2*   PHOSPHORUS mg/dL 2 7 2 7 4 1 4 5      Results from last 7 days   Lab Units 05/26/21  0942   INR  1 20*   PTT seconds 36      Results from last 7 days   Lab Units 05/26/21  0942   TROPONIN I ng/mL <0 02     Results from last 7 days   Lab Units 05/27/21  0516 05/27/21  0001 05/26/21 2006 05/26/21  1857 05/26/21  0942   LACTIC ACID mmol/L 0 8 0 7 1 1 1 9 1 3     ABG:  Results from last 7 days   Lab Units 05/27/21  0517   PH ART  7 326*   PCO2 ART mm Hg 38 3   PO2 ART mm Hg 100 5   HCO3 ART mmol/L 19 6*   BASE EXC ART mmol/L -5 9   ABG SOURCE  Line, Arterial     VBG:  Results from last 7 days   Lab Units 21  0517   ABG SOURCE  Line, Arterial           Micro  Results from last 7 days   Lab Units 21  1909 21  1844 21  0942 21  0935   BLOOD CULTURE   --   --  No Growth at 48 hrs  No Growth at 48 hrs  GRAM STAIN RESULT  Rare Polys*  Rare Gram positive cocci in clusters* Rare Polys*  Rare Gram positive cocci in pairs*  --   --    WOUND CULTURE  2+ Growth of Beta Hemolytic Streptococcus Group G* 2+ Growth of Beta Hemolytic Streptococcus Group G*  --   --          Imagin/28 CXR:  Left perihilar infiltrates may be related to developing pulmonary edema or pneumonia      Central venous catheter at SVC/RA junction  I have personally reviewed pertinent reports  Intake and Output  I/O        07 -  0700  07 -  07 07 -  0700    P  O  150 1200     I V  (mL/kg) 1729 (10 1) 249 7 (1 4)     Blood 350 350     IV Piggyback 950 550     Total Intake(mL/kg) 3179 (18 5) 2349 7 (13 6)     Urine (mL/kg/hr) 1250 (0 3) 2650 (0 6)     Blood       Total Output 1250 2650     Net +1929 -300 3                  UOP: 110 ml/hr     Height and Weights   Height: 5' 7" (170 2 cm)  IBW (Ideal Body Weight): 66 1 kg  Body mass index is 59 84 kg/m²  Weight (last 2 days)     Date/Time   Weight    21 0600   (!) 173 (382 06)    21 0600   (!) 172 (380 07)    21 0300   (!) 168 (370 59)                Nutrition       Diet Orders   (From admission, onward)             Start     Ordered    21 1623  Diet Markie/CHO Controlled; Consistent Carbohydrate Diet Level 1 (4 carb servings/60 grams CHO/meal)  Diet effective midnight     Question Answer Comment   Diet Type Markie/CHO Controlled    Markie/CHO Controlled Consistent Carbohydrate Diet Level 1 (4 carb servings/60 grams CHO/meal)    RD to adjust diet per protocol?  Yes        21 1622    21 1214  Dietary nutrition supplements  Once     Question Answer Comment   Select Supplement: [place]    Frequency Lunch, Dinner        05/27/21 1213                Active Medications  Scheduled Meds:  Current Facility-Administered Medications   Medication Dose Route Frequency Provider Last Rate    acetaminophen  975 mg Oral [facility] Nick Gloria MD      bisacodyl  10 mg Rectal Daily PRN Nick Gloria MD      cefazolin  2,000 mg Intravenous Q8H Malaika Dickerson MD 2,000 mg (05/29/21 0559)    enoxaparin  60 mg Subcutaneous Q12H [address] Nick Gloria MD      gabapentin  300 mg Oral TID Marybel Harrell PA-C      glycopyrrolate  0 2 mg Intravenous Q4H PRN David Santoro CRNA      haloperidol lactate  2 mg Intramuscular Q30 Min PRN David Santoro CRNA      HYDROmorphone  1 mg Intravenous Q2H PRN Nick Gloria MD      HYDROmorphone  2 mg Oral Q4H PRN Nick Gloria MD      Or    HYDROmorphone  4 mg Oral Q4H PRN Nick Gloria MD      insulin lispro  2-12 Units Subcutaneous TID [facility] Nick Gloria MD      ketamine  0 1 mg/kg/hr Intravenous Continuous Malaika Dickerson MD 0 1 mg/kg/hr (05/28/21 1703)    LORazepam  1 mg Intravenous Q1H PRN David Santoro CRNA      melatonin  6 mg Oral HS Nick Gloria MD      methocarbamol  750 mg Oral Q6H [address] Marybel Harrell PA-C      ondansetron  4 mg Intravenous Q6H PRN Nick Gloria MD      polyethylene glycol  17 g Oral Daily Nick Gloria MD      senna-docusate sodium  2 tablet Oral BID Nick Gloria MD       Continuous Infusions:  ketamine, 0 1 mg/kg/hr, Last Rate: 0 1 mg/kg/hr (05/28/21 1703)      PRN Meds:   bisacodyl, 10 mg, Daily PRN  glycopyrrolate, 0 2 mg, Q4H PRN  haloperidol lactate, 2 mg, Q30 Min PRN  HYDROmorphone, 1 mg, Q2H PRN  HYDROmorphone, 2 mg, Q4H PRN    Or  HYDROmorphone, 4 mg, Q4H PRN  LORazepam, 1 mg, Q1H PRN  ondansetron, 4 mg, Q6H PRN        Allergies   Allergies   Allergen Reactions    Amoxicillin Hives ---------------------------------------------------------------------------------------  Advance Directive and Living Will:      Power of :    POLST:    ---------------------------------------------------------------------------------------  Care Time Delivered:   Upon my evaluation, this patient had a high probability of imminent or life-threatening deterioration due to acute hypoxic respiratory failure, which required my direct attention, intervention, and personal management  I have personally provided 32 minutes (7517 to 2869) of critical care time, exclusive of procedures, teaching, family meetings, and any prior time recorded by providers other than myself       Marybel Harrell PA-C

## 2021-05-30 LAB
ANION GAP SERPL CALCULATED.3IONS-SCNC: 6 MMOL/L (ref 4–13)
BASOPHILS # BLD MANUAL: 0.11 THOUSAND/UL (ref 0–0.1)
BASOPHILS NFR MAR MANUAL: 1 % (ref 0–1)
BUN SERPL-MCNC: 20 MG/DL (ref 5–25)
CA-I BLD-SCNC: 1.11 MMOL/L (ref 1.12–1.32)
CALCIUM SERPL-MCNC: 8.5 MG/DL (ref 8.3–10.1)
CHLORIDE SERPL-SCNC: 104 MMOL/L (ref 100–108)
CO2 SERPL-SCNC: 25 MMOL/L (ref 21–32)
CREAT SERPL-MCNC: 0.92 MG/DL (ref 0.6–1.3)
DACRYOCYTES BLD QL SMEAR: PRESENT
EOSINOPHIL # BLD MANUAL: 0.22 THOUSAND/UL (ref 0–0.4)
EOSINOPHIL NFR BLD MANUAL: 2 % (ref 0–6)
ERYTHROCYTE [DISTWIDTH] IN BLOOD BY AUTOMATED COUNT: 15.6 % (ref 11.6–15.1)
GFR SERPL CREATININE-BSD FRML MDRD: 104 ML/MIN/1.73SQ M
GLUCOSE SERPL-MCNC: 149 MG/DL (ref 65–140)
GLUCOSE SERPL-MCNC: 150 MG/DL (ref 65–140)
GLUCOSE SERPL-MCNC: 155 MG/DL (ref 65–140)
GLUCOSE SERPL-MCNC: 165 MG/DL (ref 65–140)
GLUCOSE SERPL-MCNC: 224 MG/DL (ref 65–140)
GLUCOSE SERPL-MCNC: 237 MG/DL (ref 65–140)
HCT VFR BLD AUTO: 27.1 % (ref 36.5–49.3)
HGB BLD-MCNC: 8 G/DL (ref 12–17)
LYMPHOCYTES # BLD AUTO: 1.19 THOUSAND/UL (ref 0.6–4.47)
LYMPHOCYTES # BLD AUTO: 11 % (ref 14–44)
MAGNESIUM SERPL-MCNC: 2.2 MG/DL (ref 1.6–2.6)
MCH RBC QN AUTO: 25.3 PG (ref 26.8–34.3)
MCHC RBC AUTO-ENTMCNC: 29.5 G/DL (ref 31.4–37.4)
MCV RBC AUTO: 86 FL (ref 82–98)
METAMYELOCYTES NFR BLD MANUAL: 1 % (ref 0–1)
MONOCYTES # BLD AUTO: 0.76 THOUSAND/UL (ref 0–1.22)
MONOCYTES NFR BLD: 7 % (ref 4–12)
MYELOCYTES NFR BLD MANUAL: 2 % (ref 0–1)
NEUTROPHILS # BLD MANUAL: 8.22 THOUSAND/UL (ref 1.85–7.62)
NEUTS BAND NFR BLD MANUAL: 4 % (ref 0–8)
NEUTS SEG NFR BLD AUTO: 72 % (ref 43–75)
NRBC BLD AUTO-RTO: 1 /100 WBCS
PHOSPHATE SERPL-MCNC: 2.6 MG/DL (ref 2.7–4.5)
PLATELET # BLD AUTO: 376 THOUSANDS/UL (ref 149–390)
PLATELET BLD QL SMEAR: ADEQUATE
PMV BLD AUTO: 9.7 FL (ref 8.9–12.7)
POIKILOCYTOSIS BLD QL SMEAR: PRESENT
POTASSIUM SERPL-SCNC: 3.9 MMOL/L (ref 3.5–5.3)
RBC # BLD AUTO: 3.16 MILLION/UL (ref 3.88–5.62)
RBC MORPH BLD: PRESENT
SODIUM SERPL-SCNC: 135 MMOL/L (ref 136–145)
VANCOMYCIN TROUGH SERPL-MCNC: 2 UG/ML (ref 10–20)
WBC # BLD AUTO: 10.82 THOUSAND/UL (ref 4.31–10.16)

## 2021-05-30 PROCEDURE — 85007 BL SMEAR W/DIFF WBC COUNT: CPT | Performed by: PHYSICIAN ASSISTANT

## 2021-05-30 PROCEDURE — 99024 POSTOP FOLLOW-UP VISIT: CPT | Performed by: SURGERY

## 2021-05-30 PROCEDURE — 84100 ASSAY OF PHOSPHORUS: CPT | Performed by: PHYSICIAN ASSISTANT

## 2021-05-30 PROCEDURE — 97167 OT EVAL HIGH COMPLEX 60 MIN: CPT

## 2021-05-30 PROCEDURE — 83735 ASSAY OF MAGNESIUM: CPT | Performed by: PHYSICIAN ASSISTANT

## 2021-05-30 PROCEDURE — 94660 CPAP INITIATION&MGMT: CPT

## 2021-05-30 PROCEDURE — 82948 REAGENT STRIP/BLOOD GLUCOSE: CPT

## 2021-05-30 PROCEDURE — 97163 PT EVAL HIGH COMPLEX 45 MIN: CPT

## 2021-05-30 PROCEDURE — 80202 ASSAY OF VANCOMYCIN: CPT | Performed by: PHYSICIAN ASSISTANT

## 2021-05-30 PROCEDURE — 85027 COMPLETE CBC AUTOMATED: CPT | Performed by: PHYSICIAN ASSISTANT

## 2021-05-30 PROCEDURE — 82330 ASSAY OF CALCIUM: CPT | Performed by: PHYSICIAN ASSISTANT

## 2021-05-30 PROCEDURE — 94760 N-INVAS EAR/PLS OXIMETRY 1: CPT

## 2021-05-30 PROCEDURE — 80048 BASIC METABOLIC PNL TOTAL CA: CPT | Performed by: PHYSICIAN ASSISTANT

## 2021-05-30 RX ADMIN — CEFAZOLIN SODIUM 2000 MG: 2 SOLUTION INTRAVENOUS at 20:37

## 2021-05-30 RX ADMIN — HYDROMORPHONE HYDROCHLORIDE 1 MG: 1 INJECTION, SOLUTION INTRAMUSCULAR; INTRAVENOUS; SUBCUTANEOUS at 15:04

## 2021-05-30 RX ADMIN — METHOCARBAMOL TABLETS 750 MG: 750 TABLET, COATED ORAL at 23:20

## 2021-05-30 RX ADMIN — HYDROMORPHONE HYDROCHLORIDE 1 MG: 1 INJECTION, SOLUTION INTRAMUSCULAR; INTRAVENOUS; SUBCUTANEOUS at 17:03

## 2021-05-30 RX ADMIN — ASPIRIN 81 MG: 81 TABLET, COATED ORAL at 09:03

## 2021-05-30 RX ADMIN — ACETAMINOPHEN 975 MG: 325 TABLET, FILM COATED ORAL at 23:00

## 2021-05-30 RX ADMIN — GABAPENTIN 300 MG: 300 CAPSULE ORAL at 09:03

## 2021-05-30 RX ADMIN — GABAPENTIN 300 MG: 300 CAPSULE ORAL at 20:33

## 2021-05-30 RX ADMIN — TICAGRELOR 90 MG: 90 TABLET ORAL at 00:55

## 2021-05-30 RX ADMIN — HYDROMORPHONE HYDROCHLORIDE 1 MG: 1 INJECTION, SOLUTION INTRAMUSCULAR; INTRAVENOUS; SUBCUTANEOUS at 12:02

## 2021-05-30 RX ADMIN — TICAGRELOR 90 MG: 90 TABLET ORAL at 20:33

## 2021-05-30 RX ADMIN — HYDROMORPHONE HYDROCHLORIDE 4 MG: 4 TABLET ORAL at 09:03

## 2021-05-30 RX ADMIN — DOCUSATE SODIUM AND SENNOSIDES 2 TABLET: 8.6; 5 TABLET ORAL at 17:02

## 2021-05-30 RX ADMIN — HYDROMORPHONE HYDROCHLORIDE 4 MG: 4 TABLET ORAL at 23:20

## 2021-05-30 RX ADMIN — METHOCARBAMOL TABLETS 750 MG: 750 TABLET, COATED ORAL at 12:01

## 2021-05-30 RX ADMIN — CEFAZOLIN SODIUM 2000 MG: 2 SOLUTION INTRAVENOUS at 12:08

## 2021-05-30 RX ADMIN — CARVEDILOL 6.25 MG: 6.25 TABLET, FILM COATED ORAL at 09:03

## 2021-05-30 RX ADMIN — HYDROMORPHONE HYDROCHLORIDE 1 MG: 1 INJECTION, SOLUTION INTRAMUSCULAR; INTRAVENOUS; SUBCUTANEOUS at 09:51

## 2021-05-30 RX ADMIN — CARVEDILOL 6.25 MG: 6.25 TABLET, FILM COATED ORAL at 17:03

## 2021-05-30 RX ADMIN — METHOCARBAMOL TABLETS 750 MG: 750 TABLET, COATED ORAL at 00:55

## 2021-05-30 RX ADMIN — ENOXAPARIN SODIUM 60 MG: 60 INJECTION SUBCUTANEOUS at 09:04

## 2021-05-30 RX ADMIN — GABAPENTIN 300 MG: 300 CAPSULE ORAL at 17:02

## 2021-05-30 RX ADMIN — ACETAMINOPHEN 975 MG: 325 TABLET, FILM COATED ORAL at 13:51

## 2021-05-30 RX ADMIN — MELATONIN TAB 3 MG 6 MG: 3 TAB at 23:20

## 2021-05-30 RX ADMIN — INSULIN LISPRO 2 UNITS: 100 INJECTION, SOLUTION INTRAVENOUS; SUBCUTANEOUS at 17:04

## 2021-05-30 RX ADMIN — HYDROMORPHONE HYDROCHLORIDE 4 MG: 4 TABLET ORAL at 05:04

## 2021-05-30 RX ADMIN — TICAGRELOR 90 MG: 90 TABLET ORAL at 09:04

## 2021-05-30 RX ADMIN — HYDROMORPHONE HYDROCHLORIDE 4 MG: 4 TABLET ORAL at 19:18

## 2021-05-30 RX ADMIN — HYDROMORPHONE HYDROCHLORIDE 4 MG: 4 TABLET ORAL at 00:56

## 2021-05-30 RX ADMIN — HYDROMORPHONE HYDROCHLORIDE 1 MG: 1 INJECTION, SOLUTION INTRAMUSCULAR; INTRAVENOUS; SUBCUTANEOUS at 20:38

## 2021-05-30 RX ADMIN — METHOCARBAMOL TABLETS 750 MG: 750 TABLET, COATED ORAL at 05:04

## 2021-05-30 RX ADMIN — CEFAZOLIN SODIUM 2000 MG: 2 SOLUTION INTRAVENOUS at 05:04

## 2021-05-30 RX ADMIN — ACETAMINOPHEN 975 MG: 325 TABLET, FILM COATED ORAL at 05:04

## 2021-05-30 RX ADMIN — POLYETHYLENE GLYCOL 3350 17 G: 17 POWDER, FOR SOLUTION ORAL at 09:03

## 2021-05-30 RX ADMIN — METHOCARBAMOL TABLETS 750 MG: 750 TABLET, COATED ORAL at 17:02

## 2021-05-30 RX ADMIN — DOCUSATE SODIUM AND SENNOSIDES 2 TABLET: 8.6; 5 TABLET ORAL at 09:03

## 2021-05-30 RX ADMIN — ENOXAPARIN SODIUM 60 MG: 60 INJECTION SUBCUTANEOUS at 20:34

## 2021-05-30 RX ADMIN — HYDROMORPHONE HYDROCHLORIDE 4 MG: 4 TABLET ORAL at 13:51

## 2021-05-30 RX ADMIN — INSULIN LISPRO 4 UNITS: 100 INJECTION, SOLUTION INTRAVENOUS; SUBCUTANEOUS at 12:05

## 2021-05-30 NOTE — PROGRESS NOTES
Progress Note - General Surgery   Anthony Payne 37 y o  male MRN: [id number]  Unit/Bed#: [facility] 616-01 Encounter: [id number]    Assessment:  41M w cellulitis and a L thigh fluid collection s/p bedside I&D, subsequent wound exploration in OR 5/26 and 5/28 washout/closure over penrose drains    Plan:  Markie/CHO  Ancef  Daily/prn dressing changes over wounds  pulm toilet/wean O2  Needs CPAP at night  Elevate LLE  Prn pain control- APS consult  PT/OT  DVT ppx    Subjective/Objective   Chief Complaint:     Subjective: Afebrile  Required replacement of BiPAP overnight 18/10 45%, now maintaining O2 sats and more awake  Still with left thigh pain  Objective:     Blood pressure 125/77, pulse 78, temperature 98 °F (36 7 °C), temperature source Axillary, resp  rate 22, height 5' 7" (1 702 m), weight (!) 173 kg (382 lb 0 9 oz), SpO2 95 %  ,Body mass index is 59 84 kg/m²        Intake/Output Summary (Last 24 hours) at 5/30/2021 0448  Last data filed at 5/29/2021 1949  Gross per 24 hour   Intake 2508 8 ml   Output 3100 ml   Net -591 2 ml       Invasive Devices     Peripheral Intravenous Line            Peripheral IV 05/26/21 Medial;Right Antecubital 3 days    Peripheral IV 05/26/21 Right Hand 3 days          Drain            Open Drain Left Thigh 1 day    Open Drain Left Thigh 1 day                Physical Exam: NAD  Atraumatic/normocephalic  AAOX3  Normal mood and affect  Normal respiratory effort, BiPAP  Soft, non tender, ND, obese  L thigh incisions c/d/i  Left leg swollen, dependent leg starting to develop a blister        Skin warm/dry  Cap refil <2 sec

## 2021-05-30 NOTE — PLAN OF CARE
Problem: PHYSICAL THERAPY ADULT  Goal: Performs mobility at highest level of function for planned discharge setting  See evaluation for individualized goals  Description: Treatment/Interventions: Functional transfer training, LE strengthening/ROM, Elevations, Therapeutic exercise, Endurance training, Patient/family training, Equipment eval/education, Bed mobility, Gait training, Spoke to nursing, OT  Equipment Recommended: Rasheed       See flowsheet documentation for full assessment, interventions and recommendations  Note: Prognosis: Good  Problem List: Decreased strength, Decreased range of motion, Decreased endurance, Impaired balance, Decreased mobility, Pain, Decreased skin integrity, Obesity  Assessment: Pt is 37 y o  male seen for PT evaluation s/p admit to [facility] on 5/26/2021  Two pt identifiers were used to confirm  Pt presented w/ worsening left lower extremity pain and fevers  Patient originally presented at [facility] and was transferred to [facility] for further medical management/ evaluation  Pt underwent DEBRIDEMENT LOWER EXTREMITY ([address]) (Left) which was performed on 5/26/21, as well as Washout left thigh wound and closure (Left) which was performed on 5/28/21  Pt was admitted with a primary dx of:  LLE cellulitis and fluid collection, sepsis, CLOVIS, acute hypoxic respiratory insufficiency, acute on chronic pain, and other active problems including morbid obesity, CAD, DM  Pt with up with assist orders  Pts current co morbidities affecting treatment include:  CAD, DM, HLD, HTN, mi, morbid obesity     Pts current clinical presentation is Unstable/ Unpredictable (high complexity) due to Ongoing medical management for primary dx, Increased reliance on more restrictive AD compared to baseline, Decreased activity tolerance compared to baseline, Fall risk, Increased assistance needed from caregiver at current time, Increased O2 via NC from pts baseline, Continuous pulse oximetry monitoring , s/p surgical intervention    Upon evaluation, pt currently is requiring Supervision for bed mobility; Min Ax1 for transfers and Min Ax1 for ambulation w/ bariatric RW  Pt presents at PT eval functioning below baseline and currently w/ overall mobility deficits 2* to: BLE weakness, decreased ROM, impaired balance, decreased endurance, gait deviations, pain, decreased activity tolerance compared to baseline, fall risk, decreased skin integrity  Pt currently at a fall risk 2* to impairments listed above  Based on the aforementioned PT evaluation, pt will continue to benefit from skilled Acute PT interventions to address stated impairments; to maximize functional mobility; for ongoing pt/ family training; and DME needs  At conclusion of PT session pt returned back in chair with phone and call bell within reach  Pt denies any further questions at this time  PT is currently recommending Rehab  PT will continue to follow during hospital stay  Barriers to Discharge: Inaccessible home environment, Decreased caregiver support        PT Discharge Recommendation: Post acute rehabilitation services     PT - OK to Discharge: Yes(To rehab when medically cleared)    See flowsheet documentation for full assessment

## 2021-05-30 NOTE — OCCUPATIONAL THERAPY NOTE
Occupational Therapy Evaluation     Patient Name: Mallory Wallis  YSBBL'V Date: 5/30/2021  Problem List  Principal Problem:    Sepsis [facility])  Active Problems:    Shortness of breath    Difficult intubation    Diabetic neuropathy [facility])    Past Medical History  Past Medical History:   Diagnosis Date    Coronary artery disease     Diabetes mellitus ([facility] )     Heart disease     CAD   s/p ptca with 1 stent 2012    Hidradenitis suppurativa     groin    Hyperlipidemia     Hypertension     [facility]     " silent " M I  in the past    Morbid obesity with BMI of 50 0-59 9, adult ([facility] )     My[facility] infarction [facility]     Nicotine dependence     Obesity     Pilonidal cyst      Past Surgical History  Past Surgical History:   Procedure Laterality Date    CORONARY ANGIOPLASTY WITH STENT PLACEMENT      INCISION AND DRAINAGE OF WOUND N/A 1/24/2017    Procedure: INCISION AND DRAINAGE (I&D) BUTTOCK, PILONIDAL CYST;  Surgeon: Shantel Mock MD;  Location: [address] [place] [address];  Service:    Quincy Gallardo LEG SURGERY Left     I&D of left leg 2012    NM EXC SKIN BENIG >4 CM TRUNK,ARM,LEG Left 4/6/2021    Procedure: EXCISION THIGH MASS;  Surgeon: Elza Gonsales MD;  [facility];  Service: General    NM NEG PRESS WOUND TX, < 50 CM Left 4/6/2021    Procedure: APPLICATION VAC DRESSING THIGH;  Surgeon: Elza Gonsales MD;  [facility];  Service: General    WOUND DEBRIDEMENT Left 4/17/2021    Procedure: DEBRIDEMENT WOUND AND DRESSING CHANGE ([address]); Surgeon: Chrissie Schroeder DO;  [facility];  Service: General    WOUND DEBRIDEMENT Left 4/22/2021    Procedure: DEBRIDEMENT LOWER EXTREMITY [facility] OUT), left groin and thigh;  Surgeon: Elza Gonsales MD;  [facility];  Service: General    WOUND DEBRIDEMENT Left 5/26/2021    Procedure: DEBRIDEMENT LOWER EXTREMITY ([address] OUT); Surgeon:  Quique Bergman DO;  [facility];  Service: General           05/30/21 1000   OT Last Visit   OT Visit Date 05/30/21   Note Type   Note type Evaluation   Restrictions/Precautions   Weight Bearing Precautions Per Order No   Pain Assessment   Pain Assessment Tool 0-10   Pain Score 8   Pain Location/Orientation Orientation: Left; Location: Leg   Hospital Pain Intervention(s) Repositioned; Ambulation/increased activity; Emotional support   Home Living   Type of [address] One level;Stairs to enter with rails   Prior Function   Level of [place] Independent with ADLs and functional mobility   Lives With Son   Receives Help From Family   ADL Assistance Independent   IADLs Independent   Falls in the last 6 months 0   Vocational On disability   Lifestyle   Autonomy I adls and mobility - i iadls - shares homemaking with son    Reciprocal Relationships supportive family and friends   Service to Others disabled    Intrinsic Gratification active pta - enjoys fishing/outdoors   Subjective   Subjective offers no c/o    ADL   Eating Assistance 7  Independent   Grooming Assistance 5  Supervision/Setup   UB Bathing Assistance 4  Minimal Assistance   LB Bathing Assistance 3  Moderate Assistance   [address] 4  [address] 3  Moderate [address] [place] [address]  4  Minimal Assistance   Bed Mobility   Supine to Sit 5  Supervision   Additional items HOB elevated; Bedrails   Transfers   Sit to Stand 4  Minimal assistance   Stand to Sit 4  Minimal assistance   Stand pivot 4  Minimal assistance   Functional Mobility   Functional Mobility 4  Minimal assistance   Balance   Static Sitting Fair +   Dynamic Sitting Fair   Static Standing Fair -   Dynamic Standing Fair -   Ambulatory Fair -   Activity Tolerance   Activity Tolerance Patient limited by fatigue;Treatment limited secondary to medical complications (Comment)   RUE Assessment   RUE Assessment WFL   LUE Assessment   LUE Assessment WFL   Cognition   Overall Cognitive Status WFL   Assessment   Limitation Decreased ADL status; Decreased endurance;Decreased self-care trans;Decreased high-level ADLs   Prognosis Good   Assessment Pt is a 37 y o  male who was admitted to [facility] on 5/26/2021 with Sepsis [facility]) s/p L thigh I&D, washout, wound exploration and closure  Pt's problem list also includes PMH of DM, HTN, obesity and CAD, PTCA, hld, MI  At baseline pt was completing adls and mobility independently w/o ad, I iadls - shares homemaking with son  Pt lives with son in 1 story home  Currently pt requires moderate assist for overall ADLS and min assist for functional mobility/transfers  Pt currently presents with impairments in the following categories -steps to enter environment, limited home support, difficulty performing ADLS, difficulty performing IADLS  and environment activity tolerance, endurance and standing balance/tolerance  These impairments, as well as pt's fatigue, pain, decreased caregiver support and risk for falls  limit pt's ability to safely engage in all baseline areas of occupation, includingbathing, dressing, toileting, functional mobility/transfers, community mobility, laundry , driving, house maintenance, meal prep, cleaning, social participation  and leisure activities  From OT standpoint, recommend inpt rehab upon D/C  OT will continue to follow to address the below stated goals  Goals   Patient Goals have less pain    LTG Time Frame 10-14   Long Term Goal #1 refer to established goals below    Plan   Treatment Interventions ADL retraining;Functional transfer training; Endurance training;Patient/family training;Equipment evaluation/education; Compensatory technique education; Energy conservation; Activityengagement   Goal Expiration Date 06/13/21   OT Frequency 3-5x/wk   Recommendation   OT Discharge Recommendation Post acute rehabilitation services   AM-PAC Daily Activity Inpatient   Lower Body Dressing 2   Bathing 2   Toileting 3   Upper Body Dressing 3   Grooming 4   Eating 4   Daily Activity Raw Score 18   Daily Activity Standardized Score (Calc for Raw Score >=11) 38 66   AM-[facility] Applied Cognition Inpatient   Following a Speech/Presentation 4   Understanding Ordinary Conversation 4   Taking Medications 4   Remembering Where Things Are Placed or Put Away 4   Remembering List of 4-5 Errands 4   Taking Care of Complicated Tasks 4   Applied Cognition Raw Score 24   Applied Cognition Standardized Score 62 21         OCCUPATIONAL THERAPY GOALS:    *Mod I adls after setup with use of AE PRN  *Mod I toileting and clothing management   *Mod I functional mobility and transfers to/from all surfaces with good dynamic balance and safety for participation in dynamic adls and iadl tasks   *Demonstrate good carryover with safe use of RW during functional tasks   *Assess DME needs   *Increase activity tolerance to 35-40 minutes for participation in adls and enjoyable activities  *Assist with safe d/c recommendations     The patient's raw score on the AM-PAC Daily Activity inpatient short form is 18, standardized score is 38 66, less than 39 4  Patients at this level are likely to benefit from discharge to post-acute rehabilitation services  Please refer to the recommendation of the Occupational Therapist for safe discharge planning      Shabnam Barker

## 2021-05-30 NOTE — CASE MANAGEMENT
PT IS A 30 DAYS READMISSION  PT IS NOT A BUNDLE  CM met with pt to discuss DCP and cm role  Pt lives with son in a 2sh with 10 kelly  Prior to admission pt was independent with ambulation and with ADLS  Pt is open to [facility] for wound care  Pt's preference for pharmacy is [facility] in [address]  Pt denies any hx of drugs/ETOH or inpt psych stays  CM reviewed d/c planning process including the following: identifying help at home, patient preference for d/c planning needs, Discharge Lounge, [facility] Meds to Bed program, availability of treatment team to discuss questions or concerns patient and/or family may have regarding understanding medications and recognizing signs and symptoms once discharged  CM also encouraged patient to follow up with all recommended appointments after discharge  Patient advised of importance for patient and family to participate in managing patients medical well being  A post acute care recommendation was made by your care team for STR  Discussed Freedom of Choice with patient  Pt does not want to go to rehab at this time states that he would like to see how he progresses if he is unable to walk closer to d/c would agree to rehab   Cm to continue to follow

## 2021-05-30 NOTE — PLAN OF CARE
Problem: OCCUPATIONAL THERAPY ADULT  Goal: Performs self-care activities at highest level of function for planned discharge setting  See evaluation for individualized goals  Description: Treatment Interventions: ADL retraining, Functional transfer training, Endurance training, Patient/family training, Equipment evaluation/education, Compensatory technique education, Energy conservation, Activityengagement          See flowsheet documentation for full assessment, interventions and recommendations  Note: Limitation: Decreased ADL status, Decreased endurance, Decreased self-care trans, Decreased high-level ADLs  Prognosis: Good  Assessment: Pt is a 37 y o  male who was admitted to [facility] on 5/26/2021 with Sepsis ([address] ) s/p L thigh I&D, washout, wound exploration and closure  Pt's problem list also includes PMH of DM, HTN, obesity and CAD, PTCA, hld, MI  At baseline pt was completing adls and mobility independently w/o ad, I iadls - shares homemaking with son  Pt lives with son in 1 story home  Currently pt requires moderate assist for overall ADLS and min assist for functional mobility/transfers  Pt currently presents with impairments in the following categories -steps to enter environment, limited home support, difficulty performing ADLS, difficulty performing IADLS  and environment activity tolerance, endurance and standing balance/tolerance  These impairments, as well as pt's fatigue, pain, decreased caregiver support and risk for falls  limit pt's ability to safely engage in all baseline areas of occupation, includingbathing, dressing, toileting, functional mobility/transfers, community mobility, laundry , driving, house maintenance, meal prep, cleaning, social participation  and leisure activities  From OT standpoint, recommend inpt rehab upon D/C  OT will continue to follow to address the below stated goals        OT Discharge Recommendation: Post acute rehabilitation services

## 2021-05-30 NOTE — PHYSICAL THERAPY NOTE
PHYSICAL THERAPY EVALUATION  NAME:  Audra Barron  DATE: 05/30/21    AGE:   37 y o  Mrn:   [id number]  ADMIT DX:  Cutaneous abscess of groin [L02 [id number]]  Thigh abscess [L02 419]    Past Medical History:   Diagnosis Date    Coronary artery disease     Diabetes mellitus ([facility] )     Heart disease     CAD   s/p ptca with 1 stent 2012    Hidradenitis suppurativa     groin    Hyperlipidemia     Hypertension     MI (myocardial infarction) ([place] [address] )     " silent " M I  in the past    Morbid obesity with BMI of 50 0-59 9, adult ([facility] )     Myocardial infarction ([place] [address] )     Nicotine dependence     Obesity     Pilonidal cyst        Past Surgical History:   Procedure Laterality Date    CORONARY ANGIOPLASTY WITH STENT PLACEMENT      INCISION AND DRAINAGE OF WOUND N/A 1/24/2017    Procedure: INCISION AND DRAINAGE (I&D) BUTTOCK, PILONIDAL CYST;  Surgeon: Poonam Ayon MD;  Location: [address] [place] [address];  Service:    Jake Fuentes LEG SURGERY Left     I&D of left leg 2012    VT EXC SKIN BENIG >4 CM TRUNK,ARM,LEG Left 4/6/2021    Procedure: EXCISION THIGH MASS;  Surgeon: Jonathan Stearns MD;  [facility];  Service: General    VT NEG PRESS WOUND TX, < 50 CM Left 4/6/2021    Procedure: APPLICATION VAC DRESSING THIGH;  Surgeon: Jonathan Stearns MD;  [facility];  Service: General    WOUND DEBRIDEMENT Left 4/17/2021    Procedure: DEBRIDEMENT WOUND AND DRESSING CHANGE ([address] OUT); Surgeon: Cali Peña DO;  [facility];  Service: General    WOUND DEBRIDEMENT Left 4/22/2021    Procedure: DEBRIDEMENT LOWER EXTREMITY [facility] OUT), left groin and thigh;  Surgeon: Jonathan Stearns MD;  [facility];  Service: General    WOUND DEBRIDEMENT Left 5/26/2021    Procedure: DEBRIDEMENT LOWER EXTREMITY ([address] OUT); Surgeon:  Rodney Ghotra DO;  [facility];  Service: General       Length Of Stay: 3    PHYSICAL THERAPY EVALUATION:        05/30/21 1005   Note Type   Note type Evaluation   Pain Assessment   Pain Assessment Tool 0-10 Pain Score 8   Pain Location/Orientation Orientation: Left; Location: Leg   Pain Onset/Description Onset: Ongoing;Frequency: Constant/Continuous; Descriptor: Aching   Effect of Pain on Daily Activities Increased pain with activity   Patient's Stated Pain Goal No pain   Hospital Pain Intervention(s) Ambulation/increased activity;Repositioned   Home Living   Type of [address] level;Stairs to enter with rails  (full flight of steps to enter )   Home Equipment Walker;Cane   Additional Comments Patient reports living with it is 25year-old son who is able to assist as needed   Prior Function   Level of [place] Independent with ADLs and functional mobility   Lives With Son   Bal in the last 6 months 0   Vocational On disability   Comments Patient denies use of an assistive device for ambulation prior to admission   Restrictions/Precautions   Weight Bearing Precautions Per Order No   Other Precautions Multiple lines;O2;Fall Risk;Pain  (6L O2 via NC )   General   Family/Caregiver Present No   Cognition   Overall Cognitive Status WFL   Arousal/Participation Alert   Orientation Level Oriented to person;Oriented to place;Oriented to time   Memory Within functional limits   Following Commands Follows all commands and directions without difficulty   RUE Assessment   RUE Assessment WFL   LUE Assessment   LUE Assessment WFL   RLE Assessment   RLE Assessment WFL   Strength RLE   RLE Overall Strength 4/5   LLE Assessment   LLE Assessment X  (limited AROM 2* pain )   Strength LLE   LLE Overall Strength 3+/5  (Limited by pain)   Bed Mobility   Supine to Sit 5  Supervision   Additional items Increased time required;Verbal cues   Transfers   Sit to Stand 4  Minimal assistance   Additional items Assist x 1; Increased time required;Verbal cues   Stand to Sit 4  Minimal assistance   Additional items Assist x 1; Increased time required;Verbal cues Ambulation/Elevation   Gait pattern Short stride; Foward flexed; Inconsistent padma; Excessively slow; Wide DEJON; Decreased foot clearance   Gait Assistance 4  Minimal assist   Additional items Assist x 1   Assistive Device Bariatric Rolling walker   Distance 5ft  ( limited by fatigue )   Balance   Static Sitting Fair +   Static Standing Fair -   Ambulatory Fair -   Endurance Deficit   Endurance Deficit Yes   Endurance Deficit Description Fatigue and pain   Activity Tolerance   Activity Tolerance Patient limited by fatigue;Patient limited by pain   Medical Staff Made Aware Rachana Wynn, OT; OT present for co evaluation due to pts unstable medical status, decreased activity tolerance compared to baseline which limits pts physical performance    Nurse Made Aware Patient appropriate to be seen and mobilized per nursing   Assessment   Prognosis Good   Problem List Decreased strength;Decreased range of motion;Decreased endurance; Impaired balance;Decreased mobility;Pain;Decreased skin integrity;Obesity   Assessment Pt is 37 y o  male seen for PT evaluation s/p admit to [address] [place] [address] on 5/26/2021  Two pt identifiers were used to confirm  Pt presented w/ worsening left lower extremity pain and fevers  Patient [facility] and was [facility] for further medical management/ evaluation  Pt underwent DEBRIDEMENT LOWER EXTREMITY ([address]) (Left) which was performed on 5/26/21, as well as Washout left thigh wound and closure (Left) which was performed on 5/28/21  Pt was admitted with a primary dx of:  LLE cellulitis and fluid collection, sepsis, CLOVIS, acute hypoxic respiratory insufficiency, acute on chronic pain, and other active problems including morbid obesity, CAD, DM  Pt with up with assist orders  Pts current co morbidities affecting treatment include:  CAD, DM, HLD, HTN, mi, morbid obesity     Pts current clinical presentation is Unstable/ Unpredictable (high complexity) due to Ongoing medical management for primary dx, Increased reliance on more restrictive AD compared to baseline, Decreased activity tolerance compared to baseline, Fall risk, Increased assistance needed from caregiver at current time, Increased O2 via NC from pts baseline, Continuous pulse oximetry monitoring , s/p surgical intervention    Upon evaluation, pt currently is requiring Supervision for bed mobility; Min Ax1 for transfers and Min Ax1 for ambulation w/ bariatric RW  Pt presents at PT eval functioning below baseline and currently w/ overall mobility deficits 2* to: BLE weakness, decreased ROM, impaired balance, decreased endurance, gait deviations, pain, decreased activity tolerance compared to baseline, fall risk, decreased skin integrity  Pt currently at a fall risk 2* to impairments listed above  Based on the aforementioned PT evaluation, pt will continue to benefit from skilled Acute PT interventions to address stated impairments; to maximize functional mobility; for ongoing pt/ family training; and DME needs  At conclusion of PT session pt returned back in chair with phone and call bell within reach  Pt denies any further questions at this time  PT is currently recommending Rehab  PT will continue to follow during hospital stay  Barriers to Discharge Inaccessible home environment;Decreased caregiver support   Goals   Patient Goals " to have less pain"   STG Expiration Date 06/09/21   Short Term Goal #1 In 10 days pt will complete: 1) Bed mobility skills with mod I to increase safety and independence as well as decrease caregiver burden  2) Functional transfers with mod I to promote increased independence, safety, and QOL  3) Ambulate 200' using least restrictive AD with mod I without LOB and stable vitals so that pt can negotiate previous living environment safely and promote independence with functional mobility and return to PLOF   4) Stair training up/ down 5 step/s using rail/s with S so that pt can enter/negotiate previous living environment safely and decrease fall risk  5) Improve balance grades by 1/2 grade to increase safety with all mobility and decrease fall risk  6) Improve BLE strength by 1/2 grade to help increase overall functional mobility and decrease fall risk  Plan   Treatment/Interventions Functional transfer training;LE strengthening/ROM; Elevations; Therapeutic exercise; Endurance training;Patient/family training;Equipment eval/education; Bed mobility;Gait training;Spoke to nursing;OT   PT Frequency Other (Comment)  (3-6x a week )   Recommendation   PT Discharge Recommendation Post acute rehabilitation services   Equipment Recommended [facility] Recommended HD Bariatric wheeled walker   PT - OK to Discharge Yes  (To rehab when medically cleared)   AM-PAC Basic Mobility Inpatient   Turning in Bed Without Bedrails 4   Lying on Back to Sitting on Edge of Flat Bed 4   Moving Bed to Chair 3   Standing Up From Chair 3   Walk in Room 2   Climb 3-5 Stairs 2   Basic Mobility Inpatient Raw Score 18   Basic Mobility Standardized Score 41 05   Modified Eulogio Scale   Modified [place] Scale 4   Barthel Index   Feeding 10   Bathing 0   Grooming Score 0   Dressing Score 5   Bladder Score 10   Bowels Score 10   Toilet Use Score 5   Transfers (Bed/Chair) Score 10   Mobility (Level Surface) Score 0   Stairs Score 0   Barthel Index Score 50   Portions of the documentation may have been created using voice recognition software  Occasional wrong word or sound alike substitutions may have occurred due to the inherent limitations of the voice recognition software  Read the chart carefully and recognize, using context, where substitutions have occurred      Phi Galloway, PT, DPT

## 2021-05-30 NOTE — PLAN OF CARE
Problem: Nutrition/Hydration-ADULT  Goal: Nutrient/Hydration intake appropriate for improving, restoring or maintaining nutritional needs  Description: Monitor and assess patient's nutrition/hydration status for malnutrition  Collaborate with interdisciplinary team and initiate plan and interventions as ordered  Monitor patient's weight and dietary intake as ordered or per policy  Utilize nutrition screening tool and intervene as necessary  Determine patient's food preferences and provide high-protein, high-caloric foods as appropriate       INTERVENTIONS:  - Monitor oral intake, urinary output, labs, and treatment plans  - Assess nutrition and hydration status and recommend course of action  - Evaluate amount of meals eaten  - Assist patient with eating if necessary   - Allow adequate time for meals  - Recommend/ encourage appropriate diets, oral nutritional supplements, and vitamin/mineral supplements  - Order, calculate, and assess calorie counts as needed  - Recommend, monitor, and adjust tube feedings and TPN/PPN based on assessed needs  - Assess need for intravenous fluids  - Provide specific nutrition/hydration education as appropriate  - Include patient/family/caregiver in decisions related to nutrition  Outcome: Progressing     Problem: PAIN - ADULT  Goal: Verbalizes/displays adequate comfort level or baseline comfort level  Description: Interventions:  - Encourage patient to monitor pain and request assistance  - Assess pain using appropriate pain scale  - Administer analgesics based on type and severity of pain and evaluate response  - Implement non-pharmacological measures as appropriate and evaluate response  - Consider cultural and social influences on pain and pain management  - Notify physician/advanced practitioner if interventions unsuccessful or patient reports new pain  Outcome: Progressing     Problem: INFECTION - ADULT  Goal: Absence or prevention of progression during hospitalization  Description: INTERVENTIONS:  - Assess and monitor for signs and symptoms of infection  - Monitor lab/diagnostic results  - Monitor all insertion sites, i e  indwelling lines, tubes, and drains  - Monitor endotracheal if appropriate and nasal secretions for changes in amount and color  - [place] appropriate cooling/warming therapies per order  - Administer medications as ordered  - Instruct and encourage patient and family to use good hand hygiene technique  - Identify and instruct in appropriate isolation precautions for identified infection/condition  Outcome: Progressing  Goal: Absence of fever/infection during neutropenic period  Description: INTERVENTIONS:  - Monitor WBC    Outcome: Progressing     Problem: SAFETY ADULT  Goal: Patient will remain free of falls  Description: INTERVENTIONS:  - Assess patient frequently for physical needs  -  Identify cognitive and physical deficits and behaviors that affect risk of falls    -  [place] fall precautions as indicated by assessment   - Educate patient/family on patient safety including physical limitations  - Instruct patient to call for assistance with activity based on assessment  - Modify environment to reduce risk of injury  - Consider OT/PT consult to assist with strengthening/mobility  Outcome: Progressing  Goal: Maintain or return to baseline ADL function  Description: INTERVENTIONS:  -  Assess patient's ability to carry out ADLs; assess patient's baseline for ADL function and identify physical deficits which impact ability to perform ADLs (bathing, care of mouth/teeth, toileting, grooming, dressing, etc )  - Assess/evaluate cause of self-care deficits   - Assess range of motion  - Assess patient's mobility; develop plan if impaired  - Assess patient's need for assistive devices and provide as appropriate  - Encourage maximum independence but intervene and supervise when necessary  - Involve family in performance of ADLs  - Assess for home care needs following discharge   - Consider OT consult to assist with ADL evaluation and planning for discharge  - Provide patient education as appropriate  Outcome: Progressing  Goal: Maintain or return mobility status to optimal level  Description: INTERVENTIONS:  - Assess patient's baseline mobility status (ambulation, transfers, stairs, etc )    - Identify cognitive and physical deficits and behaviors that affect mobility  - Identify mobility aids required to assist with transfers and/or ambulation (gait belt, sit-to-stand, lift, walker, cane, etc )  - [place] fall precautions as indicated by assessment  - Record patient progress and toleration of activity level on Mobility SBAR; progress patient to next Phase/Stage  - Instruct patient to call for assistance with activity based on assessment  - Consider rehabilitation consult to assist with strengthening/weightbearing, etc   Outcome: Progressing     Problem: DISCHARGE PLANNING  Goal: Discharge to home or other facility with appropriate resources  Description: INTERVENTIONS:  - Identify barriers to discharge w/patient and caregiver  - Arrange for needed discharge resources and transportation as appropriate  - Identify discharge learning needs (meds, wound care, etc )  - Arrange for interpretive services to assist at discharge as needed  - Refer to Case Management Department for coordinating discharge planning if the patient needs post-hospital services based on physician/advanced practitioner order or complex needs related to functional status, cognitive ability, or social support system  Outcome: Progressing     Problem: Prexisting or High Potential for Compromised Skin Integrity  Goal: Skin integrity is maintained or improved  Description: INTERVENTIONS:  - Identify patients at risk for skin breakdown  - Assess and monitor skin integrity  - Assess and monitor nutrition and hydration status  - Monitor labs   - Assess for incontinence   - Turn and reposition patient  - Assist with mobility/ambulation  - Relieve pressure over bony prominences  - Avoid friction and shearing  - Provide appropriate hygiene as needed including keeping skin clean and dry  - Evaluate need for skin moisturizer/barrier cream  - Collaborate with interdisciplinary team   - Patient/family teaching  - Consider wound care consult   Outcome: Progressing     Problem: Potential for Falls  Goal: Patient will remain free of falls  Description: INTERVENTIONS:  - Assess patient frequently for physical needs  -  Identify cognitive and physical deficits and behaviors that affect risk of falls    -  [place] fall precautions as indicated by assessment   - Educate patient/family on patient safety including physical limitations  - Instruct patient to call for assistance with activity based on assessment  - Modify environment to reduce risk of injury  - Consider OT/PT consult to assist with strengthening/mobility  Outcome: Progressing

## 2021-05-31 LAB
ANION GAP SERPL CALCULATED.3IONS-SCNC: 4 MMOL/L (ref 4–13)
BACTERIA BLD CULT: NORMAL
BACTERIA BLD CULT: NORMAL
BUN SERPL-MCNC: 15 MG/DL (ref 5–25)
CALCIUM SERPL-MCNC: 8.7 MG/DL (ref 8.3–10.1)
CHLORIDE SERPL-SCNC: 105 MMOL/L (ref 100–108)
CO2 SERPL-SCNC: 28 MMOL/L (ref 21–32)
CREAT SERPL-MCNC: 0.84 MG/DL (ref 0.6–1.3)
ERYTHROCYTE [DISTWIDTH] IN BLOOD BY AUTOMATED COUNT: 15.9 % (ref 11.6–15.1)
GFR SERPL CREATININE-BSD FRML MDRD: 110 ML/MIN/1.73SQ M
GLUCOSE SERPL-MCNC: 156 MG/DL (ref 65–140)
GLUCOSE SERPL-MCNC: 174 MG/DL (ref 65–140)
GLUCOSE SERPL-MCNC: 190 MG/DL (ref 65–140)
GLUCOSE SERPL-MCNC: 194 MG/DL (ref 65–140)
GLUCOSE SERPL-MCNC: 201 MG/DL (ref 65–140)
HCT VFR BLD AUTO: 28.3 % (ref 36.5–49.3)
HGB BLD-MCNC: 8.3 G/DL (ref 12–17)
MCH RBC QN AUTO: 25.1 PG (ref 26.8–34.3)
MCHC RBC AUTO-ENTMCNC: 29.3 G/DL (ref 31.4–37.4)
MCV RBC AUTO: 86 FL (ref 82–98)
NRBC BLD AUTO-RTO: 0 /100 WBCS
PLATELET # BLD AUTO: 475 THOUSANDS/UL (ref 149–390)
PMV BLD AUTO: 9.7 FL (ref 8.9–12.7)
POTASSIUM SERPL-SCNC: 4.1 MMOL/L (ref 3.5–5.3)
RBC # BLD AUTO: 3.31 MILLION/UL (ref 3.88–5.62)
SODIUM SERPL-SCNC: 137 MMOL/L (ref 136–145)
WBC # BLD AUTO: 13.49 THOUSAND/UL (ref 4.31–10.16)

## 2021-05-31 PROCEDURE — 99024 POSTOP FOLLOW-UP VISIT: CPT | Performed by: SURGERY

## 2021-05-31 PROCEDURE — 82948 REAGENT STRIP/BLOOD GLUCOSE: CPT

## 2021-05-31 PROCEDURE — 80048 BASIC METABOLIC PNL TOTAL CA: CPT | Performed by: STUDENT IN AN ORGANIZED HEALTH CARE EDUCATION/TRAINING PROGRAM

## 2021-05-31 PROCEDURE — 85027 COMPLETE CBC AUTOMATED: CPT | Performed by: STUDENT IN AN ORGANIZED HEALTH CARE EDUCATION/TRAINING PROGRAM

## 2021-05-31 RX ORDER — HYDROMORPHONE HCL/PF 1 MG/ML
0.5 SYRINGE (ML) INJECTION ONCE
Status: COMPLETED | OUTPATIENT
Start: 2021-05-31 | End: 2021-05-31

## 2021-05-31 RX ADMIN — ASPIRIN 81 MG: 81 TABLET, COATED ORAL at 08:37

## 2021-05-31 RX ADMIN — DOCUSATE SODIUM AND SENNOSIDES 2 TABLET: 8.6; 5 TABLET ORAL at 08:36

## 2021-05-31 RX ADMIN — HYDROMORPHONE HYDROCHLORIDE 1 MG: 1 INJECTION, SOLUTION INTRAMUSCULAR; INTRAVENOUS; SUBCUTANEOUS at 17:24

## 2021-05-31 RX ADMIN — ACETAMINOPHEN 975 MG: 325 TABLET, FILM COATED ORAL at 14:28

## 2021-05-31 RX ADMIN — METHOCARBAMOL TABLETS 750 MG: 750 TABLET, COATED ORAL at 11:04

## 2021-05-31 RX ADMIN — INSULIN LISPRO 2 UNITS: 100 INJECTION, SOLUTION INTRAVENOUS; SUBCUTANEOUS at 17:24

## 2021-05-31 RX ADMIN — MELATONIN TAB 3 MG 6 MG: 3 TAB at 21:06

## 2021-05-31 RX ADMIN — GABAPENTIN 300 MG: 300 CAPSULE ORAL at 17:24

## 2021-05-31 RX ADMIN — HYDROMORPHONE HYDROCHLORIDE 1 MG: 1 INJECTION, SOLUTION INTRAMUSCULAR; INTRAVENOUS; SUBCUTANEOUS at 00:34

## 2021-05-31 RX ADMIN — HYDROMORPHONE HYDROCHLORIDE 1 MG: 1 INJECTION, SOLUTION INTRAMUSCULAR; INTRAVENOUS; SUBCUTANEOUS at 21:04

## 2021-05-31 RX ADMIN — TICAGRELOR 90 MG: 90 TABLET ORAL at 20:35

## 2021-05-31 RX ADMIN — TICAGRELOR 90 MG: 90 TABLET ORAL at 08:37

## 2021-05-31 RX ADMIN — GABAPENTIN 300 MG: 300 CAPSULE ORAL at 08:37

## 2021-05-31 RX ADMIN — HYDROMORPHONE HYDROCHLORIDE 4 MG: 4 TABLET ORAL at 20:35

## 2021-05-31 RX ADMIN — CARVEDILOL 6.25 MG: 6.25 TABLET, FILM COATED ORAL at 08:37

## 2021-05-31 RX ADMIN — ENOXAPARIN SODIUM 60 MG: 60 INJECTION SUBCUTANEOUS at 08:37

## 2021-05-31 RX ADMIN — ACETAMINOPHEN 975 MG: 325 TABLET, FILM COATED ORAL at 05:23

## 2021-05-31 RX ADMIN — GABAPENTIN 300 MG: 300 CAPSULE ORAL at 20:34

## 2021-05-31 RX ADMIN — HYDROMORPHONE HYDROCHLORIDE 1 MG: 1 INJECTION, SOLUTION INTRAMUSCULAR; INTRAVENOUS; SUBCUTANEOUS at 06:29

## 2021-05-31 RX ADMIN — HYDROMORPHONE HYDROCHLORIDE 1 MG: 1 INJECTION, SOLUTION INTRAMUSCULAR; INTRAVENOUS; SUBCUTANEOUS at 11:04

## 2021-05-31 RX ADMIN — POLYETHYLENE GLYCOL 3350 17 G: 17 POWDER, FOR SOLUTION ORAL at 08:37

## 2021-05-31 RX ADMIN — ACETAMINOPHEN 975 MG: 325 TABLET, FILM COATED ORAL at 21:06

## 2021-05-31 RX ADMIN — HYDROMORPHONE HYDROCHLORIDE 4 MG: 4 TABLET ORAL at 05:23

## 2021-05-31 RX ADMIN — METHOCARBAMOL TABLETS 750 MG: 750 TABLET, COATED ORAL at 17:24

## 2021-05-31 RX ADMIN — CARVEDILOL 6.25 MG: 6.25 TABLET, FILM COATED ORAL at 17:24

## 2021-05-31 RX ADMIN — HYDROMORPHONE HYDROCHLORIDE 4 MG: 4 TABLET ORAL at 09:51

## 2021-05-31 RX ADMIN — DOCUSATE SODIUM AND SENNOSIDES 2 TABLET: 8.6; 5 TABLET ORAL at 17:24

## 2021-05-31 RX ADMIN — HYDROMORPHONE HYDROCHLORIDE 4 MG: 4 TABLET ORAL at 14:28

## 2021-05-31 RX ADMIN — CEFAZOLIN SODIUM 2000 MG: 2 SOLUTION INTRAVENOUS at 20:34

## 2021-05-31 RX ADMIN — INSULIN LISPRO 2 UNITS: 100 INJECTION, SOLUTION INTRAVENOUS; SUBCUTANEOUS at 08:37

## 2021-05-31 RX ADMIN — ENOXAPARIN SODIUM 60 MG: 60 INJECTION SUBCUTANEOUS at 20:35

## 2021-05-31 RX ADMIN — CEFAZOLIN SODIUM 2000 MG: 2 SOLUTION INTRAVENOUS at 13:19

## 2021-05-31 RX ADMIN — HYDROMORPHONE HYDROCHLORIDE 0.5 MG: 1 INJECTION, SOLUTION INTRAMUSCULAR; INTRAVENOUS; SUBCUTANEOUS at 18:03

## 2021-05-31 RX ADMIN — METHOCARBAMOL TABLETS 750 MG: 750 TABLET, COATED ORAL at 05:23

## 2021-05-31 RX ADMIN — INSULIN LISPRO 2 UNITS: 100 INJECTION, SOLUTION INTRAVENOUS; SUBCUTANEOUS at 12:04

## 2021-05-31 RX ADMIN — HYDROMORPHONE HYDROCHLORIDE 1 MG: 1 INJECTION, SOLUTION INTRAMUSCULAR; INTRAVENOUS; SUBCUTANEOUS at 13:19

## 2021-05-31 RX ADMIN — CEFAZOLIN SODIUM 2000 MG: 2 SOLUTION INTRAVENOUS at 05:24

## 2021-05-31 NOTE — PROGRESS NOTES
Progress Note - General Surgery   Sherlyn Payment 37 y o  male MRN: [id number]  Unit/Bed#: [facility] 032-36 Encounter: [id number]    Assessment:  41M w cellulitis and a L thigh fluid collection s/p bedside I&D, subsequent wound exploration in OR 5/26 and 5/28 washout/closure over penrose drains    Plan:  [place]/CHO controlled diet  Ancef  Daily/PRN dressing changes  CPAP at night  Elevate LLE  APS consulted for pain control, appreciate recommendations  PT/OT - rehab  DVT PPX    Subjective/Objective     Subjective: Complaining of pain and swelling in left lower extremity, tolerating diet, no nausea/vomiting    Objective:    Blood pressure 136/50, pulse 82, temperature 98 8 °F (37 1 °C), resp  rate 18, height 5' 7" (1 702 m), weight (!) 173 kg (381 lb), SpO2 93 %  ,Body mass index is 59 67 kg/m²  Intake/Output Summary (Last 24 hours) at 5/31/2021 0655  Last data filed at 5/31/2021 6404  Gross per 24 hour   Intake 1970 ml   Output 1200 ml   Net 770 ml       Invasive Devices     Peripheral Intravenous Line            Peripheral IV 05/30/21 Left Antecubital less than 1 day          Drain            Open Drain Left Thigh 2 days    Open Drain Left Thigh 2 days                Physical Exam:  Gen:    NAD  CV:      warm, well-perfused  Lungs: No respiratory distress  Abd:     soft, NT/ND  Ext:      erythema and edema of entire LLE, wound closed with 2 penrose drains, 4x4 and ABD dressing in situ, no purulence  Neuro: A&Ox3       Lab, Imaging and other studies:I have personally reviewed pertinent lab results      VTE Pharmacologic Prophylaxis: Enoxaparin (Lovenox)  VTE Mechanical Prophylaxis: sequential compression device

## 2021-05-31 NOTE — PLAN OF CARE
Problem: Nutrition/Hydration-ADULT  Goal: Nutrient/Hydration intake appropriate for improving, restoring or maintaining nutritional needs  Description: Monitor and assess patient's nutrition/hydration status for malnutrition  Collaborate with interdisciplinary team and initiate plan and interventions as ordered  Monitor patient's weight and dietary intake as ordered or per policy  Utilize nutrition screening tool and intervene as necessary  Determine patient's food preferences and provide high-protein, high-caloric foods as appropriate       INTERVENTIONS:  - Monitor oral intake, urinary output, labs, and treatment plans  - Assess nutrition and hydration status and recommend course of action  - Evaluate amount of meals eaten  - Assist patient with eating if necessary   - Allow adequate time for meals  - Recommend/ encourage appropriate diets, oral nutritional supplements, and vitamin/mineral supplements  - Order, calculate, and assess calorie counts as needed  - Recommend, monitor, and adjust tube feedings and TPN/PPN based on assessed needs  - Assess need for intravenous fluids  - Provide specific nutrition/hydration education as appropriate  - Include patient/family/caregiver in decisions related to nutrition  Outcome: Progressing     Problem: PAIN - ADULT  Goal: Verbalizes/displays adequate comfort level or baseline comfort level  Description: Interventions:  - Encourage patient to monitor pain and request assistance  - Assess pain using appropriate pain scale  - Administer analgesics based on type and severity of pain and evaluate response  - Implement non-pharmacological measures as appropriate and evaluate response  - Consider cultural and social influences on pain and pain management  - Notify physician/advanced practitioner if interventions unsuccessful or patient reports new pain  Outcome: Progressing     Problem: INFECTION - ADULT  Goal: Absence or prevention of progression during hospitalization  Description: INTERVENTIONS:  - Assess and monitor for signs and symptoms of infection  - Monitor lab/diagnostic results  - Monitor all insertion sites, i e  indwelling lines, tubes, and drains  - Monitor endotracheal if appropriate and nasal secretions for changes in amount and color  - [place] appropriate cooling/warming therapies per order  - Administer medications as ordered  - Instruct and encourage patient and family to use good hand hygiene technique  - Identify and instruct in appropriate isolation precautions for identified infection/condition  Outcome: Progressing  Goal: Absence of fever/infection during neutropenic period  Description: INTERVENTIONS:  - Monitor WBC    Outcome: Progressing     Problem: SAFETY ADULT  Goal: Patient will remain free of falls  Description: INTERVENTIONS:  - Assess patient frequently for physical needs  -  Identify cognitive and physical deficits and behaviors that affect risk of falls    -  [place] fall precautions as indicated by assessment   - Educate patient/family on patient safety including physical limitations  - Instruct patient to call for assistance with activity based on assessment  - Modify environment to reduce risk of injury  - Consider OT/PT consult to assist with strengthening/mobility  Outcome: Progressing  Goal: Maintain or return to baseline ADL function  Description: INTERVENTIONS:  -  Assess patient's ability to carry out ADLs; assess patient's baseline for ADL function and identify physical deficits which impact ability to perform ADLs (bathing, care of mouth/teeth, toileting, grooming, dressing, etc )  - Assess/evaluate cause of self-care deficits   - Assess range of motion  - Assess patient's mobility; develop plan if impaired  - Assess patient's need for assistive devices and provide as appropriate  - Encourage maximum independence but intervene and supervise when necessary  - Involve family in performance of ADLs  - Assess for home care needs following discharge   - Consider OT consult to assist with ADL evaluation and planning for discharge  - Provide patient education as appropriate  Outcome: Progressing  Goal: Maintain or return mobility status to optimal level  Description: INTERVENTIONS:  - Assess patient's baseline mobility status (ambulation, transfers, stairs, etc )    - Identify cognitive and physical deficits and behaviors that affect mobility  - Identify mobility aids required to assist with transfers and/or ambulation (gait belt, sit-to-stand, lift, walker, cane, etc )  - [place] fall precautions as indicated by assessment  - Record patient progress and toleration of activity level on Mobility SBAR; progress patient to next Phase/Stage  - Instruct patient to call for assistance with activity based on assessment  - Consider rehabilitation consult to assist with strengthening/weightbearing, etc   Outcome: Progressing     Problem: DISCHARGE PLANNING  Goal: Discharge to home or other facility with appropriate resources  Description: INTERVENTIONS:  - Identify barriers to discharge w/patient and caregiver  - Arrange for needed discharge resources and transportation as appropriate  - Identify discharge learning needs (meds, wound care, etc )  - Arrange for interpretive services to assist at discharge as needed  - Refer to Case Management Department for coordinating discharge planning if the patient needs post-hospital services based on physician/advanced practitioner order or complex needs related to functional status, cognitive ability, or social support system  Outcome: Progressing     Problem: Prexisting or High Potential for Compromised Skin Integrity  Goal: Skin integrity is maintained or improved  Description: INTERVENTIONS:  - Identify patients at risk for skin breakdown  - Assess and monitor skin integrity  - Assess and monitor nutrition and hydration status  - Monitor labs   - Assess for incontinence   - Turn and reposition patient  - Assist with mobility/ambulation  - Relieve pressure over bony prominences  - Avoid friction and shearing  - Provide appropriate hygiene as needed including keeping skin clean and dry  - Evaluate need for skin moisturizer/barrier cream  - Collaborate with interdisciplinary team   - Patient/family teaching  - Consider wound care consult   Outcome: Progressing     Problem: Potential for Falls  Goal: Patient will remain free of falls  Description: INTERVENTIONS:  - Assess patient frequently for physical needs  -  Identify cognitive and physical deficits and behaviors that affect risk of falls    -  [place] fall precautions as indicated by assessment   - Educate patient/family on patient safety including physical limitations  - Instruct patient to call for assistance with activity based on assessment  - Modify environment to reduce risk of injury  - Consider OT/PT consult to assist with strengthening/mobility  Outcome: Progressing

## 2021-06-01 ENCOUNTER — APPOINTMENT (INPATIENT)
Dept: RADIOLOGY | Facility: HOSPITAL | Age: 41
DRG: 853 | End: 2021-06-01
Payer: COMMERCIAL

## 2021-06-01 LAB
ANION GAP SERPL CALCULATED.3IONS-SCNC: 4 MMOL/L (ref 4–13)
BUN SERPL-MCNC: 15 MG/DL (ref 5–25)
CALCIUM SERPL-MCNC: 9 MG/DL (ref 8.3–10.1)
CHLORIDE SERPL-SCNC: 101 MMOL/L (ref 100–108)
CO2 SERPL-SCNC: 32 MMOL/L (ref 21–32)
CREAT SERPL-MCNC: 0.77 MG/DL (ref 0.6–1.3)
ERYTHROCYTE [DISTWIDTH] IN BLOOD BY AUTOMATED COUNT: 16.1 % (ref 11.6–15.1)
GFR SERPL CREATININE-BSD FRML MDRD: 114 ML/MIN/1.73SQ M
GLUCOSE SERPL-MCNC: 125 MG/DL (ref 65–140)
GLUCOSE SERPL-MCNC: 152 MG/DL (ref 65–140)
GLUCOSE SERPL-MCNC: 174 MG/DL (ref 65–140)
GLUCOSE SERPL-MCNC: 209 MG/DL (ref 65–140)
GLUCOSE SERPL-MCNC: 239 MG/DL (ref 65–140)
HCT VFR BLD AUTO: 30.8 % (ref 36.5–49.3)
HGB BLD-MCNC: 9 G/DL (ref 12–17)
MCH RBC QN AUTO: 25.3 PG (ref 26.8–34.3)
MCHC RBC AUTO-ENTMCNC: 29.2 G/DL (ref 31.4–37.4)
MCV RBC AUTO: 87 FL (ref 82–98)
NRBC BLD AUTO-RTO: 0 /100 WBCS
PLATELET # BLD AUTO: 594 THOUSANDS/UL (ref 149–390)
PMV BLD AUTO: 9.2 FL (ref 8.9–12.7)
POTASSIUM SERPL-SCNC: 4.3 MMOL/L (ref 3.5–5.3)
RBC # BLD AUTO: 3.56 MILLION/UL (ref 3.88–5.62)
SODIUM SERPL-SCNC: 137 MMOL/L (ref 136–145)
WBC # BLD AUTO: 14.26 THOUSAND/UL (ref 4.31–10.16)

## 2021-06-01 PROCEDURE — 99024 POSTOP FOLLOW-UP VISIT: CPT | Performed by: SURGERY

## 2021-06-01 PROCEDURE — 73701 CT LOWER EXTREMITY W/DYE: CPT

## 2021-06-01 PROCEDURE — 80048 BASIC METABOLIC PNL TOTAL CA: CPT | Performed by: STUDENT IN AN ORGANIZED HEALTH CARE EDUCATION/TRAINING PROGRAM

## 2021-06-01 PROCEDURE — 99223 1ST HOSP IP/OBS HIGH 75: CPT | Performed by: INTERNAL MEDICINE

## 2021-06-01 PROCEDURE — 85027 COMPLETE CBC AUTOMATED: CPT | Performed by: STUDENT IN AN ORGANIZED HEALTH CARE EDUCATION/TRAINING PROGRAM

## 2021-06-01 PROCEDURE — 82948 REAGENT STRIP/BLOOD GLUCOSE: CPT

## 2021-06-01 PROCEDURE — 99222 1ST HOSP IP/OBS MODERATE 55: CPT | Performed by: NURSE PRACTITIONER

## 2021-06-01 RX ORDER — CEFAZOLIN SODIUM 2 G/50ML
2000 SOLUTION INTRAVENOUS EVERY 8 HOURS
Status: DISCONTINUED | OUTPATIENT
Start: 2021-06-01 | End: 2021-06-06 | Stop reason: HOSPADM

## 2021-06-01 RX ORDER — GABAPENTIN 400 MG/1
400 CAPSULE ORAL 3 TIMES DAILY
Status: DISCONTINUED | OUTPATIENT
Start: 2021-06-01 | End: 2021-06-04

## 2021-06-01 RX ADMIN — MELATONIN TAB 3 MG 6 MG: 3 TAB at 21:59

## 2021-06-01 RX ADMIN — CARVEDILOL 6.25 MG: 6.25 TABLET, FILM COATED ORAL at 08:36

## 2021-06-01 RX ADMIN — METHOCARBAMOL TABLETS 750 MG: 750 TABLET, COATED ORAL at 07:20

## 2021-06-01 RX ADMIN — HYDROMORPHONE HYDROCHLORIDE 1 MG: 1 INJECTION, SOLUTION INTRAMUSCULAR; INTRAVENOUS; SUBCUTANEOUS at 23:20

## 2021-06-01 RX ADMIN — TICAGRELOR 90 MG: 90 TABLET ORAL at 08:36

## 2021-06-01 RX ADMIN — ACETAMINOPHEN 975 MG: 325 TABLET, FILM COATED ORAL at 13:23

## 2021-06-01 RX ADMIN — HYDROMORPHONE HYDROCHLORIDE 1 MG: 1 INJECTION, SOLUTION INTRAMUSCULAR; INTRAVENOUS; SUBCUTANEOUS at 17:16

## 2021-06-01 RX ADMIN — HYDROMORPHONE HYDROCHLORIDE 1 MG: 1 INJECTION, SOLUTION INTRAMUSCULAR; INTRAVENOUS; SUBCUTANEOUS at 19:20

## 2021-06-01 RX ADMIN — HYDROMORPHONE HYDROCHLORIDE 1 MG: 1 INJECTION, SOLUTION INTRAMUSCULAR; INTRAVENOUS; SUBCUTANEOUS at 13:23

## 2021-06-01 RX ADMIN — HYDROMORPHONE HYDROCHLORIDE 4 MG: 4 TABLET ORAL at 02:42

## 2021-06-01 RX ADMIN — HYDROMORPHONE HYDROCHLORIDE 4 MG: 4 TABLET ORAL at 11:46

## 2021-06-01 RX ADMIN — ACETAMINOPHEN 975 MG: 325 TABLET, FILM COATED ORAL at 07:00

## 2021-06-01 RX ADMIN — HYDROMORPHONE HYDROCHLORIDE 4 MG: 4 TABLET ORAL at 07:22

## 2021-06-01 RX ADMIN — GABAPENTIN 400 MG: 400 CAPSULE ORAL at 21:54

## 2021-06-01 RX ADMIN — DOCUSATE SODIUM AND SENNOSIDES 2 TABLET: 8.6; 5 TABLET ORAL at 08:36

## 2021-06-01 RX ADMIN — ASPIRIN 81 MG: 81 TABLET, COATED ORAL at 08:36

## 2021-06-01 RX ADMIN — METHOCARBAMOL TABLETS 750 MG: 750 TABLET, COATED ORAL at 11:46

## 2021-06-01 RX ADMIN — INSULIN LISPRO 2 UNITS: 100 INJECTION, SOLUTION INTRAVENOUS; SUBCUTANEOUS at 08:36

## 2021-06-01 RX ADMIN — METHOCARBAMOL TABLETS 750 MG: 750 TABLET, COATED ORAL at 00:03

## 2021-06-01 RX ADMIN — CEFEPIME HYDROCHLORIDE 2000 MG: 2 INJECTION, POWDER, FOR SOLUTION INTRAVENOUS at 10:44

## 2021-06-01 RX ADMIN — DOCUSATE SODIUM AND SENNOSIDES 2 TABLET: 8.6; 5 TABLET ORAL at 17:16

## 2021-06-01 RX ADMIN — CARVEDILOL 6.25 MG: 6.25 TABLET, FILM COATED ORAL at 17:16

## 2021-06-01 RX ADMIN — HYDROMORPHONE HYDROCHLORIDE 1 MG: 1 INJECTION, SOLUTION INTRAMUSCULAR; INTRAVENOUS; SUBCUTANEOUS at 00:03

## 2021-06-01 RX ADMIN — ACETAMINOPHEN 975 MG: 325 TABLET, FILM COATED ORAL at 21:59

## 2021-06-01 RX ADMIN — ENOXAPARIN SODIUM 60 MG: 60 INJECTION SUBCUTANEOUS at 08:36

## 2021-06-01 RX ADMIN — HYDROMORPHONE HYDROCHLORIDE 4 MG: 4 TABLET ORAL at 21:52

## 2021-06-01 RX ADMIN — HYDROMORPHONE HYDROCHLORIDE 1 MG: 1 INJECTION, SOLUTION INTRAMUSCULAR; INTRAVENOUS; SUBCUTANEOUS at 04:14

## 2021-06-01 RX ADMIN — HYDROMORPHONE HYDROCHLORIDE 4 MG: 4 TABLET ORAL at 16:16

## 2021-06-01 RX ADMIN — HYDROMORPHONE HYDROCHLORIDE 1 MG: 1 INJECTION, SOLUTION INTRAMUSCULAR; INTRAVENOUS; SUBCUTANEOUS at 10:44

## 2021-06-01 RX ADMIN — CEFAZOLIN SODIUM 2000 MG: 2 SOLUTION INTRAVENOUS at 22:00

## 2021-06-01 RX ADMIN — HYDROMORPHONE HYDROCHLORIDE 1 MG: 1 INJECTION, SOLUTION INTRAMUSCULAR; INTRAVENOUS; SUBCUTANEOUS at 08:36

## 2021-06-01 RX ADMIN — METHOCARBAMOL TABLETS 750 MG: 750 TABLET, COATED ORAL at 17:16

## 2021-06-01 RX ADMIN — CEFAZOLIN SODIUM 2000 MG: 2 SOLUTION INTRAVENOUS at 16:16

## 2021-06-01 RX ADMIN — METHOCARBAMOL TABLETS 750 MG: 750 TABLET, COATED ORAL at 23:20

## 2021-06-01 RX ADMIN — TICAGRELOR 90 MG: 90 TABLET ORAL at 22:01

## 2021-06-01 RX ADMIN — ONDANSETRON 4 MG: 2 INJECTION INTRAMUSCULAR; INTRAVENOUS at 17:21

## 2021-06-01 RX ADMIN — ENOXAPARIN SODIUM 60 MG: 60 INJECTION SUBCUTANEOUS at 21:59

## 2021-06-01 RX ADMIN — GABAPENTIN 400 MG: 400 CAPSULE ORAL at 17:16

## 2021-06-01 RX ADMIN — GABAPENTIN 300 MG: 300 CAPSULE ORAL at 08:36

## 2021-06-01 RX ADMIN — POLYETHYLENE GLYCOL 3350 17 G: 17 POWDER, FOR SOLUTION ORAL at 08:36

## 2021-06-01 RX ADMIN — CEFAZOLIN SODIUM 2000 MG: 2 SOLUTION INTRAVENOUS at 04:22

## 2021-06-01 RX ADMIN — IOHEXOL 100 ML: 350 INJECTION, SOLUTION INTRAVENOUS at 09:44

## 2021-06-01 RX ADMIN — INSULIN LISPRO 4 UNITS: 100 INJECTION, SOLUTION INTRAVENOUS; SUBCUTANEOUS at 11:46

## 2021-06-01 NOTE — QUICK NOTE
Nurse-Patient-Provider rounds were completed with the patient's nurse today, Mike Canseco  We discussed the plan is to ascertain a CT scan of the left lower extremity due to concern of worsening cellulitis  Also at this time consult Infectious Disease team   Consider broadening on antibiotics  Monitor fever curve  Trend exam     We reviewed all of the invasive devices/lines/telemetry orders   - None  DVT Prophylaxis:  - Lovenox and SCDs    Pain Assessment / Plan:  - Continue current analgesic regimen  Mobility Assessment / Plan:  - Activity as tolerated       Goals / Barriers for discharge:  - continued workup for left lower extremity worsening cellulitis  - CT scan today  - follow-up with case management for outpatient being a  Case management following; case and discharge needs discussed  All questions and concerns were addressed  I spent greater than 12 minutes reviewing the plan with the patient and the nurse, and coordinating care for the day      Jones Elise PA-C  6/1/2021

## 2021-06-01 NOTE — PROGRESS NOTES
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 MRN: [id number]  Unit/Bed#: [facility] 373-85 Encounter: [id number]    Assessment:  38 y/o M p/w LLE cellulitis, L thigh fluid collection, s/p bedside I&D on 5/26, OR I&D on 5/26, L thigh washout/closure on 5/28    Plan:  --ADA diet  --Continue Abx through 6/3, consider broader coverage due to increased erythema  --Pain control, appreciate APS recs  --CPAP at night  --Daily dressing changes    Subjective/Objective     Subjective:     No acute events overnight  This AM, pt w/ increased LLE pain and erythema  Objective:     Blood pressure 143/82, pulse 75, temperature 98 6 °F (37 °C), temperature source Oral, resp  rate 19, height 5' 7" (1 702 m), weight (!) 173 kg (381 lb), SpO2 96 %  ,Body mass index is 59 67 kg/m²  Intake/Output Summary (Last 24 hours) at 6/1/2021 0538  Last data filed at 6/1/2021 0501  Gross per 24 hour   Intake 1250 ml   Output 4075 ml   Net -2825 ml       Invasive Devices     Peripheral Intravenous Line            Peripheral IV 05/31/21 Right;Ventral (anterior) Forearm less than 1 day          Drain            Open Drain Left Thigh 3 days    Open Drain Left Thigh 3 days                Physical Exam:     GEN: NAD  HEENT: MMM  CV: RRR  Lung: normal effort  Ab: Soft, NT/ND  Extrem: LLE penrose drain, incisions c/d/i, below-knee erythema and edema  Neuro:  A+Ox3, motor and sensation grossly intact    Lab, Imaging and other studies:CBC: No results found for: WBC, HGB, HCT, MCV, PLT, ADJUSTEDWBC, MCH, MCHC, RDW, MPV, NRBC, CMP: No results found for: SODIUM, K, CL, CO2, ANIONGAP, BUN, CREATININE, GLUCOSE, CALCIUM, AST, ALT, ALKPHOS, PROT, BILITOT, EGFR, Coagulation: No results found for: PT, INR, APTT  VTE Pharmacologic Prophylaxis: Enoxaparin (Lovenox)  VTE Mechanical Prophylaxis: sequential compression device

## 2021-06-01 NOTE — CONSULTS
Consultation - Infectious Disease   Rafa Gonzalez 37 y o  male MRN: [id number]  Unit/Bed#: [facility] 121-07 Encounter: [id number]      IMPRESSION & RECOMMENDATIONS:   Impression/Recommendations:  1  Sepsis, POA  Fever, WBC  Evolving to shock  Due to # 2/3  No other appreciable source  ROS and exam otherwise benign  Blood cultures negative  Clinically stable and nontoxic  Overall clinically improving  Now afebrile, off pressors  Rec:  · Continue antibiotics as below  · Follow temperatures closely  · Recheck CBC in a m  · Supportive care as per    2  Left thigh superinfected seroma  In setting of # 4  Status post I and D 5/26  Cultures with group G strep  Clinically improved on repeat CT  Rec:  · Continue antibiotics as below  · Postoperative care per surgery    3  Left lower extremity cellulitis  Overall appearance consistent with known streptococcal infection  Suspect worsening symptoms, erythema due to uncontrolled edema, toxin mediated skin changes  Doubt MDRO or other pathogen  Rec:  · Narrow antibiotics back to cefazolin  · Applied ACE compression wrap and advised leg elevation  · Serial exams    4  History of left groin hidradenitis  Status post excision April 2021 with chronic wound  5   MO  Risk factor for infection, poor wound healing    6  Poorly controlled DM  With hyperglycemia  Recent A1c 03/2021 7 7  Risk factor for infection  The above impression and plan was discussed in detail with Dr Castillo Proffer  Antibiotics:  Cefepime # 1  Antibiotics # 7    Thank you for this consultation  We will follow along with you  HISTORY OF PRESENT ILLNESS:  Reason for Consult:  Cellulitis    HPI: Rafa Gonzalez is a 37 y o  male with MO, chronic hidradenitis  He had a recent prolonged hospitalization in April following elective excisional surgery of hidradenitis/left groin mass  Has had a chronic left groin wound being followed closely as an outpatient by General surgery    He was seen in the office on 05/24 at which time he was noted to be febrile  His wound noted to be clean  He was swabbed for COVID which was negative  He returned to the emergency department on 05/26 for ongoing fever as well as new redness of the left leg  Upon presentation he was noted to be febrile with leukocytosis  His exam was notable for left lower extremity cellulitis  He underwent a CT of the left leg which showed a left anterior thigh abscess  He was started on vancomycin, cefazolin, and Flagyl  He underwent bedside I and D which yielded serous drainage, no purulence  After that procedure he developed hypotension and worsening fever  He was taken that evening to the OR for I and D of the left thigh which revealed a dusky fluid capsule which was sent for culture which grew group G strep  Postoperatively he required vasopressors and admission to the ICU  He returned to the OR on 05/28 for wound closure  His antibiotics were ultimately narrowed to cefazolin  Since admission his fevers have improved but he continues to have a low-grade leukocytosis  His antibiotics were changed today to cefepime  A repeat CT showed no residual abscess  We are asked to comment on further evaluation and management  In performing this consult, I have reviewed prior admission and outpatient visit records in detail  REVIEW OF SYSTEMS:  Leg feels slightly worse today in terms of pain and soreness  A complete system-based review of systems is otherwise negative      PAST MEDICAL HISTORY:  Past Medical History:   Diagnosis Date    Coronary artery disease     [facility]     Heart disease     CAD   s/p ptca with 1 stent 2012    Hidradenitis suppurativa     groin    Hyperlipidemia     Hypertension     MI (myocardial infarction) ([address] )     " silent " M I  in the past    Morbid obesity with BMI of 50 0-59 9, adult ([address] )     Myocardial infarction ([address] )     Nicotine dependence     Obesity     Pilonidal cyst      Past Surgical History:   Procedure Laterality Date    CORONARY ANGIOPLASTY WITH STENT PLACEMENT      INCISION AND DRAINAGE OF WOUND N/A 2017    Procedure: INCISION AND DRAINAGE (I&D) BUTTOCK, PILONIDAL CYST;  Surgeon: Soraya Flood MD;  Location: [address] [place] [address];  Service:    [facility] Polio LEG SURGERY Left     I&D of left leg     DC EXC SKIN BENIG >4 CM TRUNK,ARM,LEG Left 2021    Procedure: EXCISION THIGH MASS;  Surgeon: Francisco J Bridges MD;  [facility];  Service: General    DC NEG PRESS WOUND TX, < 50 CM Left 2021    Procedure: APPLICATION VAC DRESSING THIGH;  Surgeon: Francisco J Bridges MD;  [facility];  Service: General    WOUND DEBRIDEMENT Left 2021    Procedure: DEBRIDEMENT WOUND AND DRESSING CHANGE ([address] OUT); Surgeon: Rachelle Reeves DO;  [facility];  Service: General    WOUND DEBRIDEMENT Left 2021    Procedure: DEBRIDEMENT LOWER EXTREMITY [facility] OUT), left groin and thigh;  Surgeon: Francisco J Bridges MD;  [facility];  Service: General    WOUND DEBRIDEMENT Left 2021    Procedure: DEBRIDEMENT LOWER EXTREMITY ([address] OUT); Surgeon: Chase Tirado DO;  [facility];  Service: General       FAMILY HISTORY:  Non-contributory    SOCIAL HISTORY:  Social History     Substance and Sexual Activity   Alcohol Use Not Currently    Comment: rarely     Social History     Substance and Sexual Activity   Drug Use No     Social History     Tobacco Use   Smoking Status Former Smoker    Packs/day: 1 00    Years: 20 00    Pack years: 20 00    Types: Cigarettes   Smokeless Tobacco Never Used       ALLERGIES:  Allergies   Allergen Reactions    Amoxicillin Hives       MEDICATIONS:  All current active medications have been reviewed      PHYSICAL EXAM:  Vitals:  Temp:  [97 9 °F (36 6 °C)-99 2 °F (37 3 °C)] 98 3 °F (36 8 °C)  HR:  [67-81] 67  Resp:  [15-19] 17  BP: (115-155)/(60-84) 129/84  SpO2:  [93 %-99 %] 94 %  Temp (24hrs), Av 4 °F (36 9 °C), Min:97 9 °F (36 6 °C), Max:99 2 °F (37 3 °C)  Current: Temperature: 98 3 °F (36 8 °C)     Physical Exam:  General:  Well-nourished, well-developed, in no acute distress  Eyes:  Conjunctive clear with no hemorrhages or effusions  Oropharynx:  No ulcers, no lesions  Neck:  Supple, no lymphadenopathy  Lungs:  Clear to auscultation bilaterally, no accessory muscle use  Cardiac:  Regular rate and rhythm, no murmurs  Abdomen:  Soft, obese  Extremities:  No peripheral cyanosis, clubbing  Nonpitting edema left leg  Blotchy blanching and nonblanching erythema of left leg with early superficial blister formation  Skin:  No rashes, no ulcers  Neurological:  Moves all four extremities spontaneously, sensation grossly intact    LABS, IMAGING, & OTHER STUDIES:  Lab Results:  I have personally reviewed pertinent labs  Results from last 7 days   Lab Units 06/01/21  0514 05/31/21  0520 05/30/21  0540  05/26/21  1849 05/26/21  0942   POTASSIUM mmol/L 4 3 4 1 3 9   < >  --  3 9   CHLORIDE mmol/L 101 105 104   < >  --  95*   CO2 mmol/L 32 28 25   < >  --  26   CO2, I-STAT mmol/L  --   --   --   --  26  --    BUN mg/dL 15 15 20   < >  --  15   CREATININE mg/dL 0 77 0 84 0 92   < >  --  1 52*   EGFR ml/min/1 73sq m 114 110 104   < >  --  56   GLUCOSE, ISTAT mg/dl  --   --   --   --  135  --    CALCIUM mg/dL 9 0 8 7 8 5   < >  --  8 3   AST U/L  --   --   --   --   --  21   ALT U/L  --   --   --   --   --  22   ALK PHOS U/L  --   --   --   --   --  99    < > = values in this interval not displayed  Results from last 7 days   Lab Units 06/01/21  0514 05/31/21  0520 05/30/21  0540   WBC Thousand/uL 14 26* 13 49* 10 82*   HEMOGLOBIN g/dL 9 0* 8 3* 8 0*   PLATELETS Thousands/uL 594* 475* 376     Results from last 7 days   Lab Units 05/26/21  1909 05/26/21  1844 05/26/21  0942 05/26/21  0935   BLOOD CULTURE   --   --  No Growth After 5 Days  No Growth After 5 Days     GRAM STAIN RESULT  Rare Polys*  Rare Gram positive cocci in clusters* Rare Polys* Rare Gram positive cocci in pairs*  --   --    WOUND CULTURE  2+ Growth of Beta Hemolytic Streptococcus Group G* 2+ Growth of Beta Hemolytic Streptococcus Group G*  --   --        Imaging Studies:   I have personally reviewed pertinent imaging study reports and images in PACS  CT left thigh from admission reviewed personally subcutaneous collection left upper thigh    EKG, Pathology, and Other Studies:   I have personally reviewed pertinent reports

## 2021-06-01 NOTE — QUICK NOTE
Asked to come and evaluate patient because of increased swelling, pain, and erythema from the previous night  Patient reports he is having lower leg pain from knee down, this is similar to what it has been over the past few days  Better than it was on admission  Denies any numbness or tingling in his feet  No fevers/chills      General: NAD  HENT: NCAT MMM  Neck: supple, no JVD  CV: nl rate  Lungs: nl wob  No resp distress  ABD: Soft, nontender, nondistended  Extrem: left lower extremity with erythema from knee to ankle, 3+ pitting edema of lower leg, edema noted from knee upwards as well  TTP in lower leg  Patient is motor and sensory intact  Loud doppler signals to DP/PT  Incision c/d/i  Neuro: AAOx3      Plan  Patient with recent duplex which was negative for a DVT, low suspicion of dvt  Will continue to monitor swelling and erythema  If persistent leukocytosis can consider broadening antibiotics   Elevate lower extremity  Pain control prn

## 2021-06-01 NOTE — CONSULTS
Consult Note- Acute Pain Service   Jewel Connor 37 y o  male MRN: [id number]  Unit/Bed#: [facility] 616-01 Encounter: [id number]               Assessment/Plan     Assessment:   Patient Active Problem List   Diagnosis    Abnormal liver enzymes    CAD (coronary artery disease)    Dyslipidemia    HTN (hypertension)    Morbid obesity [facility] [address] )    Pilonidal cyst    DM type 2, uncontrolled, with neuropathy ([facility] )    Type 1 non-ST elevation myocardial infarction (NSTEMI) ([facility] )    Left groin mass    Skin abscess    Encounter for other preprocedural examination    Morbid (severe) obesity due to excess calories ([facility] )    Class 3 severe obesity due to excess calories with serious comorbidity and body mass index (BMI) of 50 0 to 59 9 in adult ([facility] )    Hypertriglyceridemia    Dyslipidemia (high LDL; low HDL)    Sepsis (HCC)    Shortness of breath    Difficult intubation    Diabetic neuropathy ([facility] )      Jewel Connor is a 37 y o  male who presents with fevers and worsening left lower extremity/groin pain  History of surgical excision of left groin hidradenitis  I&D of collection of left lower extremity done in the ED  Patient went to the OR for a left thigh wound washout on 05/26/2021 and again on 05/28/2021 for washout and closure  Plan:   · Tylenol 975 mg every 8 hours scheduled  · Increase gabapentin to 400 mg 3 times a day   · Robaxin 750 mg every 6 hours scheduled   · Oral Dilaudid 2 mg every 4 hours as needed for moderate pain   · Oral Dilaudid 4 mg every 4 hours as needed for severe pain  · Dilaudid 1 mg IV every 2 hours as needed for breakthrough pain   · Will plan to deescalate IV opioids in next 24 hours    Bowel management   · MiraLax daily   · Senokot S  2 times a day  · Bisacodyl daily as needed    Treatment recommendations discussed with primary care service  APS will continue to follow   Please call  / [phone] or [facility] Acute Pain Service - B (/ between [phone] and on weekends) with questions or concerns    History of Present Illness    Admit Date:  5/26/2021  Hospital Day:  5 days  Primary Service:  Surgery-General  Attending Provider:  Joanie Oconnor DO  Reason for Consult / Principal Problem: acute on chronic pain   HPI: Rafa Gonzalez is a 37 y o  male who presents with fevers and worsening left lower extremity/groin pain  History of surgical excision of left groin hidradenitis  I&D of collection and left lower extremity done in the ED  Patient went to the OR for a left thigh wound washout on 05/26/2021 and again on 05/28/2021 for washout and closure  Infectious Disease consulted for worsening erythema consistent with cellulitis of left lower extremity  Patient has been seen by the acute Pain Service during previous hospitalizations and pain has been managed with gabapentin and Dilaudid products  Current pain location(s): left leg and left groin   Pain Scale:  9-10  Quality: sharp and burning   Current Analgesic regimen: In the last 24 hours patient received Tylenol, gabapentin, robaxin, IV Dilaudid 7 5 mg and oral Dilaudid 16 mg  Patient resting in bed, reporting severe pain in his left lower extremity which is a sharp and burning pain  P r n  IV and oral Dilaudid provide mild improvement in pain  Stated he was previously on a ketamine infusion, stated the infusion minimally helped his pain and he did not like the way the ketamine made him feel, reported mild hallucinations  I have reviewed the patient's controlled substance dispensing history in the Prescription Drug Monitoring Program in compliance with the [facility] regulations before prescribing any controlled substances  Inpatient consult to Acute Pain Service  Consult performed by: PETRA Doan  Consult ordered by: Janalyn Oppenheim, PA-C          Review of Systems   Constitutional: Positive for diaphoresis and fever  Negative for chills  Respiratory: Negative for shortness of breath  Cardiovascular: Negative for chest pain  Gastrointestinal: Positive for constipation  Negative for abdominal pain, nausea and vomiting  Genitourinary: Negative for difficulty urinating  Skin: Positive for wound  Neurological: Positive for headaches  Negative for dizziness and numbness  Psychiatric/Behavioral: Negative for sleep disturbance  All other systems reviewed and are negative  Historical Information   Past Medical History:   Diagnosis Date    Coronary artery disease     Diabetes mellitus ([facility] )     Heart disease     CAD   s/p ptca with 1 stent 2012    Hidradenitis suppurativa     groin    Hyperlipidemia     Hypertension     MI (myocardial infarction) ([facility] )     " silent " M I  in the past    Morbid obesity with BMI of 50 0-59 9, adult ([place] [address] )     Myocardial infarction ([facility] )     Nicotine dependence     Obesity     Pilonidal cyst      Past Surgical History:   Procedure Laterality Date    CORONARY ANGIOPLASTY WITH STENT PLACEMENT      INCISION AND DRAINAGE OF WOUND N/A 1/24/2017    Procedure: INCISION AND DRAINAGE (I&D) BUTTOCK, PILONIDAL CYST;  Surgeon: Azalea Ramsey MD;  Location: [address] [place] [address];  Service:    [facility] LEG SURGERY Left     I&D of left leg 2012    ND EXC SKIN BENIG >4 CM TRUNK,ARM,LEG Left 4/6/2021    Procedure: EXCISION THIGH MASS;  Surgeon: Katarina Wilson MD;  [facility];  Service: General    ND NEG PRESS WOUND [address], < 50 CM Left 4/6/2021    Procedure: APPLICATION VAC DRESSING THIGH;  Surgeon: Katarina Wilson MD;  [facility];  Service: General    WOUND DEBRIDEMENT Left 4/17/2021    Procedure: DEBRIDEMENT WOUND AND DRESSING CHANGE ([address] OUT);   Surgeon: Narayan Rowan DO;  [facility];  Service: General    WOUND DEBRIDEMENT Left 4/22/2021    Procedure: DEBRIDEMENT LOWER EXTREMITY [facility] OUT), left groin and thigh;  Surgeon: Katarina Wilson MD;  [facility];  Service: General    WOUND DEBRIDEMENT Left 5/26/2021    Procedure: DEBRIDEMENT LOWER EXTREMITY [facility] [employer] [facility]); Surgeon:  Karlos Gloria DO;  [facility];  Service: General    WOUND DEBRIDEMENT Left 5/28/2021    Procedure: Washout left thigh wound and closure;  Surgeon: Kendal Welch DO;  [facility];  Service: General     Social History   Social History     Substance and Sexual Activity   Alcohol Use Not Currently    Comment: rarely     Social History     Substance and Sexual Activity   Drug Use No     Social History     Tobacco Use   Smoking Status Former Smoker    Packs/day: 1 00    Years: 20 00    Pack years: 20 00    Types: Cigarettes   Smokeless Tobacco Never Used     Family History: non-contributory    Meds/Allergies   all current active meds have been reviewed, current meds:   Current Facility-Administered Medications   Medication Dose Route Frequency    acetaminophen (TYLENOL) tablet 975 mg  975 mg Oral Q8H [address]    aspirin (ECOTRIN LOW STRENGTH) EC tablet 81 mg  81 mg Oral Daily    bisacodyl (DULCOLAX) rectal suppository 10 mg  10 mg Rectal Daily PRN    carvedilol (COREG) tablet 6 25 mg  6 25 mg Oral BID With Meals    ceFAZolin (ANCEF) IVPB (premix in dextrose) 2,000 mg 50 mL  2,000 mg Intravenous Q8H    enoxaparin (LOVENOX) subcutaneous injection 60 mg  60 mg Subcutaneous Q12H [address]    gabapentin (NEURONTIN) capsule 400 mg  400 mg Oral TID    HYDROmorphone (DILAUDID) injection 1 mg  1 mg Intravenous Q2H PRN    HYDROmorphone (DILAUDID) tablet 2 mg  2 mg Oral Q4H PRN    Or    HYDROmorphone (DILAUDID) tablet 4 mg  4 mg Oral Q4H PRN    insulin lispro (HumaLOG) 100 units/mL subcutaneous injection 2-12 Units  2-12 Units Subcutaneous TID AC    melatonin tablet 6 mg  6 mg Oral HS    methocarbamol (ROBAXIN) tablet 750 mg  750 mg Oral Q6H [address]    ondansetron (ZOFRAN) injection 4 mg  4 mg Intravenous Q6H PRN    polyethylene glycol (MIRALAX) packet 17 g  17 g Oral Daily    senna-docusate sodium (SENOKOT S) 8 6-50 mg per tablet 2 tablet  2 tablet Oral BID    sodium chloride (OCEAN) 0 65 % nasal spray 2 spray  2 spray Each Nare Q1H PRN    ticagrelor (BRILINTA) tablet 90 mg  90 mg Oral Q12H [address]    and PTA meds:   Prior to Admission Medications   Prescriptions Last Dose Informant Patient Reported? Taking?    Omega-3 Fatty Acids (fish oil) 1,000 mg   No No   Sig: Take 1 capsule (1,000 mg total) by mouth 2 (two) times a day   Ticagrelor (BRILINTA PO)   Yes No   Sig: Take by mouth   acetaminophen (TYLENOL) 325 mg tablet   No No   Sig: Take 3 tablets (975 mg total) by mouth every 8 (eight) hours   amLODIPine (NORVASC) 10 mg tablet   No No   Sig: Take 1 tablet (10 mg total) by mouth daily   aspirin (ECOTRIN LOW STRENGTH) 81 mg EC tablet   No No   Sig: Take 1 tablet (81 mg total) by mouth daily   atorvastatin (LIPITOR) 80 mg tablet   No No   Sig: Take 1 tablet (80 mg total) by mouth daily   carvedilol (COREG) 6 25 mg tablet   No No   Sig: Take 1 tablet (6 25 mg total) by mouth 2 (two) times a day with meals   gabapentin (NEURONTIN) 300 mg capsule   No No   Sig: Take 1 capsule (300 mg total) by mouth 3 (three) times a day for 14 days   Patient not taking: Reported on 5/10/2021   glimepiride (AMARYL) 4 mg tablet   No No   Sig: Take 1 tablet (4 mg total) by mouth daily with breakfast   lisinopril (ZESTRIL) 10 mg tablet   No No   Sig: Take 1 tablet (10 mg total) by mouth daily   metFORMIN (GLUCOPHAGE) 1000 MG tablet   No No   Sig: Take 1 tablet (1,000 mg total) by mouth 2 (two) times a day with meals   methocarbamol (ROBAXIN) 750 mg tablet   No No   Sig: Take 1 tablet (750 mg total) by mouth every 6 (six) hours   Patient not taking: Reported on 5/10/2021      Facility-Administered Medications: None       Allergies   Allergen Reactions    Amoxicillin Hives       Objective   Temp:  [97 9 °F (36 6 °C)-99 2 °F (37 3 °C)] 98 3 °F (36 8 °C)  HR:  [67-81] 67  Resp:  [15-19] 17  BP: (115-155)/(60-84) 129/84    Intake/Output Summary (Last 24 hours) at 6/1/2021 4486  Last data filed at 6/1/2021 1401  Gross per 24 hour   Intake 890 ml   Output 4975 ml   Net -4085 ml       Physical Exam  Vitals signs reviewed  Constitutional:       General: He is awake  He is not in acute distress  Appearance: He is not ill-appearing, toxic-appearing or diaphoretic  HENT:      Head: Normocephalic and atraumatic  Nose: Nose normal    Eyes:      Extraocular Movements: Extraocular movements intact  Neck:      Musculoskeletal: Normal range of motion  Pulmonary:      Effort: Pulmonary effort is normal  No tachypnea, bradypnea or respiratory distress  Musculoskeletal:      Left lower leg: Edema present  Skin:     Comments: Left leg erythema with ace wrap dressing intact   Neurological:      Mental Status: He is alert and oriented to person, place, and time  Mental status is at baseline  Psychiatric:         Attention and Perception: Attention normal          Mood and Affect: Mood normal          Behavior: Behavior normal  Behavior is cooperative  Lab Results:   I have personally reviewed pertinent labs  , CBC:   Lab Results   Component Value Date    WBC 14 26 (H) 06/01/2021    HGB 9 0 (L) 06/01/2021    HCT 30 8 (L) 06/01/2021    MCV 87 06/01/2021     (H) 06/01/2021    MCH 25 3 (L) 06/01/2021    MCHC 29 2 (L) 06/01/2021    RDW 16 1 (H) 06/01/2021    MPV 9 2 06/01/2021    NRBC 0 06/01/2021   , CMP:   Lab Results   Component Value Date    SODIUM 137 06/01/2021    K 4 3 06/01/2021     06/01/2021    CO2 32 06/01/2021    BUN 15 06/01/2021    CREATININE 0 77 06/01/2021    CALCIUM 9 0 06/01/2021    EGFR 114 06/01/2021   , BMP:  Lab Results   Component Value Date    SODIUM 137 06/01/2021    K 4 3 06/01/2021     06/01/2021    CO2 32 06/01/2021    BUN 15 06/01/2021    CREATININE 0 77 06/01/2021    GLUC 152 (H) 06/01/2021    CALCIUM 9 0 06/01/2021    AGAP 4 06/01/2021    EGFR 114 06/01/2021   , PT/PTT:No results found for: PT, PTT    Imaging Studies: I have personally reviewed pertinent reports  Counseling / Coordination of Care  Total floor / unit time spent today Level 3 = 55 minutes  Greater than 50% of total time was spent with the patient and / or family counseling and / or coordination of care  A description of the counseling / coordination of care: Reviewed plan of care and medications with patient, RN staff, and primary care service  Please note that the APS provides consultative services regarding pain management only  With the exception of ketamine and epidural infusions and except when indicated, final decisions regarding starting or changing doses of analgesic medications are at the discretion of the consulting service  Off hours consultation and/or medication management is generally not available      PETRA Pisano  Acute Pain Service

## 2021-06-01 NOTE — PLAN OF CARE
Problem: Nutrition/Hydration-ADULT  Goal: Nutrient/Hydration intake appropriate for improving, restoring or maintaining nutritional needs  Description: Monitor and assess patient's nutrition/hydration status for malnutrition  Collaborate with interdisciplinary team and initiate plan and interventions as ordered  Monitor patient's weight and dietary intake as ordered or per policy  Utilize nutrition screening tool and intervene as necessary  Determine patient's food preferences and provide high-protein, high-caloric foods as appropriate       INTERVENTIONS:  - Monitor oral intake, urinary output, labs, and treatment plans  - Assess nutrition and hydration status and recommend course of action  - Evaluate amount of meals eaten  - Assist patient with eating if necessary   - Allow adequate time for meals  - Recommend/ encourage appropriate diets, oral nutritional supplements, and vitamin/mineral supplements  - Order, calculate, and assess calorie counts as needed  - Recommend, monitor, and adjust tube feedings and TPN/PPN based on assessed needs  - Assess need for intravenous fluids  - Provide specific nutrition/hydration education as appropriate  - Include patient/family/caregiver in decisions related to nutrition  Outcome: Progressing     Problem: PAIN - ADULT  Goal: Verbalizes/displays adequate comfort level or baseline comfort level  Description: Interventions:  - Encourage patient to monitor pain and request assistance  - Assess pain using appropriate pain scale  - Administer analgesics based on type and severity of pain and evaluate response  - Implement non-pharmacological measures as appropriate and evaluate response  - Consider cultural and social influences on pain and pain management  - Notify physician/advanced practitioner if interventions unsuccessful or patient reports new pain  Outcome: Progressing     Problem: INFECTION - ADULT  Goal: Absence or prevention of progression during hospitalization  Description: INTERVENTIONS:  - Assess and monitor for signs and symptoms of infection  - Monitor lab/diagnostic results  - Monitor all insertion sites, i e  indwelling lines, tubes, and drains  - Monitor endotracheal if appropriate and nasal secretions for changes in amount and color  - [place] appropriate cooling/warming therapies per order  - Administer medications as ordered  - Instruct and encourage patient and family to use good hand hygiene technique  - Identify and instruct in appropriate isolation precautions for identified infection/condition  Outcome: Progressing  Goal: Absence of fever/infection during neutropenic period  Description: INTERVENTIONS:  - Monitor WBC    Outcome: Progressing     Problem: SAFETY ADULT  Goal: Patient will remain free of falls  Description: INTERVENTIONS:  - Assess patient frequently for physical needs  -  Identify cognitive and physical deficits and behaviors that affect risk of falls    -  [place] fall precautions as indicated by assessment   - Educate patient/family on patient safety including physical limitations  - Instruct patient to call for assistance with activity based on assessment  - Modify environment to reduce risk of injury  - Consider OT/PT consult to assist with strengthening/mobility  Outcome: Progressing  Goal: Maintain or return to baseline ADL function  Description: INTERVENTIONS:  -  Assess patient's ability to carry out ADLs; assess patient's baseline for ADL function and identify physical deficits which impact ability to perform ADLs (bathing, care of mouth/teeth, toileting, grooming, dressing, etc )  - Assess/evaluate cause of self-care deficits   - Assess range of motion  - Assess patient's mobility; develop plan if impaired  - Assess patient's need for assistive devices and provide as appropriate  - Encourage maximum independence but intervene and supervise when necessary  - Involve family in performance of ADLs  - Assess for home care needs following discharge   - Consider OT consult to assist with ADL evaluation and planning for discharge  - Provide patient education as appropriate  Outcome: Progressing  Goal: Maintain or return mobility status to optimal level  Description: INTERVENTIONS:  - Assess patient's baseline mobility status (ambulation, transfers, stairs, etc )    - Identify cognitive and physical deficits and behaviors that affect mobility  - Identify mobility aids required to assist with transfers and/or ambulation (gait belt, sit-to-stand, lift, walker, cane, etc )  - [place] fall precautions as indicated by assessment  - Record patient progress and toleration of activity level on Mobility SBAR; progress patient to next Phase/Stage  - Instruct patient to call for assistance with activity based on assessment  - Consider rehabilitation consult to assist with strengthening/weightbearing, etc   Outcome: Progressing     Problem: DISCHARGE PLANNING  Goal: Discharge to home or other facility with appropriate resources  Description: INTERVENTIONS:  - Identify barriers to discharge w/patient and caregiver  - Arrange for needed discharge resources and transportation as appropriate  - Identify discharge learning needs (meds, wound care, etc )  - Arrange for interpretive services to assist at discharge as needed  - Refer to Case Management Department for coordinating discharge planning if the patient needs post-hospital services based on physician/advanced practitioner order or complex needs related to functional status, cognitive ability, or social support system  Outcome: Progressing     Problem: Prexisting or High Potential for Compromised Skin Integrity  Goal: Skin integrity is maintained or improved  Description: INTERVENTIONS:  - Identify patients at risk for skin breakdown  - Assess and monitor skin integrity  - Assess and monitor nutrition and hydration status  - Monitor labs   - Assess for incontinence   - Turn and reposition patient  - Assist with mobility/ambulation  - Relieve pressure over bony prominences  - Avoid friction and shearing  - Provide appropriate hygiene as needed including keeping skin clean and dry  - Evaluate need for skin moisturizer/barrier cream  - Collaborate with interdisciplinary team   - Patient/family teaching  - Consider wound care consult   Outcome: Progressing     Problem: Potential for Falls  Goal: Patient will remain free of falls  Description: INTERVENTIONS:  - Assess patient frequently for physical needs  -  Identify cognitive and physical deficits and behaviors that affect risk of falls    -  [place] fall precautions as indicated by assessment   - Educate patient/family on patient safety including physical limitations  - Instruct patient to call for assistance with activity based on assessment  - Modify environment to reduce risk of injury  - Consider OT/PT consult to assist with strengthening/mobility  Outcome: Progressing

## 2021-06-02 LAB
ANION GAP SERPL CALCULATED.3IONS-SCNC: 1 MMOL/L (ref 4–13)
BUN SERPL-MCNC: 13 MG/DL (ref 5–25)
CALCIUM SERPL-MCNC: 9.4 MG/DL (ref 8.3–10.1)
CHLORIDE SERPL-SCNC: 101 MMOL/L (ref 100–108)
CO2 SERPL-SCNC: 34 MMOL/L (ref 21–32)
CREAT SERPL-MCNC: 0.68 MG/DL (ref 0.6–1.3)
ERYTHROCYTE [DISTWIDTH] IN BLOOD BY AUTOMATED COUNT: 16.6 % (ref 11.6–15.1)
GFR SERPL CREATININE-BSD FRML MDRD: 119 ML/MIN/1.73SQ M
GLUCOSE SERPL-MCNC: 124 MG/DL (ref 65–140)
GLUCOSE SERPL-MCNC: 136 MG/DL (ref 65–140)
GLUCOSE SERPL-MCNC: 154 MG/DL (ref 65–140)
GLUCOSE SERPL-MCNC: 164 MG/DL (ref 65–140)
HCT VFR BLD AUTO: 28.6 % (ref 36.5–49.3)
HGB BLD-MCNC: 8.5 G/DL (ref 12–17)
MCH RBC QN AUTO: 25.5 PG (ref 26.8–34.3)
MCHC RBC AUTO-ENTMCNC: 29.7 G/DL (ref 31.4–37.4)
MCV RBC AUTO: 86 FL (ref 82–98)
NRBC BLD AUTO-RTO: 0 /100 WBCS
PLATELET # BLD AUTO: 617 THOUSANDS/UL (ref 149–390)
PMV BLD AUTO: 9.1 FL (ref 8.9–12.7)
POTASSIUM SERPL-SCNC: 4.5 MMOL/L (ref 3.5–5.3)
RBC # BLD AUTO: 3.33 MILLION/UL (ref 3.88–5.62)
SODIUM SERPL-SCNC: 136 MMOL/L (ref 136–145)
WBC # BLD AUTO: 15.31 THOUSAND/UL (ref 4.31–10.16)

## 2021-06-02 PROCEDURE — 85027 COMPLETE CBC AUTOMATED: CPT | Performed by: SURGERY

## 2021-06-02 PROCEDURE — 82948 REAGENT STRIP/BLOOD GLUCOSE: CPT

## 2021-06-02 PROCEDURE — 97530 THERAPEUTIC ACTIVITIES: CPT

## 2021-06-02 PROCEDURE — 80048 BASIC METABOLIC PNL TOTAL CA: CPT | Performed by: SURGERY

## 2021-06-02 PROCEDURE — 99232 SBSQ HOSP IP/OBS MODERATE 35: CPT | Performed by: INTERNAL MEDICINE

## 2021-06-02 PROCEDURE — 99024 POSTOP FOLLOW-UP VISIT: CPT | Performed by: SURGERY

## 2021-06-02 RX ORDER — DIAZEPAM 5 MG/1
5 TABLET ORAL EVERY 6 HOURS SCHEDULED
Status: DISCONTINUED | OUTPATIENT
Start: 2021-06-02 | End: 2021-06-04

## 2021-06-02 RX ORDER — DIAZEPAM 5 MG/ML
5 INJECTION, SOLUTION INTRAMUSCULAR; INTRAVENOUS ONCE
Status: COMPLETED | OUTPATIENT
Start: 2021-06-02 | End: 2021-06-02

## 2021-06-02 RX ORDER — NYSTATIN 100000 [USP'U]/G
1 POWDER TOPICAL 2 TIMES DAILY
Status: DISCONTINUED | OUTPATIENT
Start: 2021-06-02 | End: 2021-06-06 | Stop reason: HOSPADM

## 2021-06-02 RX ORDER — HYDROMORPHONE HCL/PF 1 MG/ML
0.5 SYRINGE (ML) INJECTION ONCE
Status: COMPLETED | OUTPATIENT
Start: 2021-06-02 | End: 2021-06-02

## 2021-06-02 RX ADMIN — ENOXAPARIN SODIUM 60 MG: 60 INJECTION SUBCUTANEOUS at 21:42

## 2021-06-02 RX ADMIN — METHOCARBAMOL TABLETS 750 MG: 750 TABLET, COATED ORAL at 05:08

## 2021-06-02 RX ADMIN — TICAGRELOR 90 MG: 90 TABLET ORAL at 08:13

## 2021-06-02 RX ADMIN — HYDROMORPHONE HYDROCHLORIDE 1 MG: 1 INJECTION, SOLUTION INTRAMUSCULAR; INTRAVENOUS; SUBCUTANEOUS at 08:08

## 2021-06-02 RX ADMIN — DIAZEPAM 5 MG: 5 TABLET ORAL at 23:25

## 2021-06-02 RX ADMIN — GABAPENTIN 400 MG: 400 CAPSULE ORAL at 08:12

## 2021-06-02 RX ADMIN — NYSTATIN 1 APPLICATION: 100000 POWDER TOPICAL at 17:30

## 2021-06-02 RX ADMIN — DOCUSATE SODIUM AND SENNOSIDES 2 TABLET: 8.6; 5 TABLET ORAL at 17:30

## 2021-06-02 RX ADMIN — HYDROMORPHONE HYDROCHLORIDE 4 MG: 4 TABLET ORAL at 20:42

## 2021-06-02 RX ADMIN — ACETAMINOPHEN 975 MG: 325 TABLET, FILM COATED ORAL at 05:08

## 2021-06-02 RX ADMIN — DOCUSATE SODIUM AND SENNOSIDES 2 TABLET: 8.6; 5 TABLET ORAL at 08:12

## 2021-06-02 RX ADMIN — HYDROMORPHONE HYDROCHLORIDE 0.5 MG: 1 INJECTION, SOLUTION INTRAMUSCULAR; INTRAVENOUS; SUBCUTANEOUS at 12:12

## 2021-06-02 RX ADMIN — HYDROMORPHONE HYDROCHLORIDE 4 MG: 4 TABLET ORAL at 16:18

## 2021-06-02 RX ADMIN — POLYETHYLENE GLYCOL 3350 17 G: 17 POWDER, FOR SOLUTION ORAL at 08:12

## 2021-06-02 RX ADMIN — CARVEDILOL 6.25 MG: 6.25 TABLET, FILM COATED ORAL at 16:18

## 2021-06-02 RX ADMIN — CEFAZOLIN SODIUM 2000 MG: 2 SOLUTION INTRAVENOUS at 05:09

## 2021-06-02 RX ADMIN — ASPIRIN 81 MG: 81 TABLET, COATED ORAL at 08:12

## 2021-06-02 RX ADMIN — METHOCARBAMOL TABLETS 750 MG: 750 TABLET, COATED ORAL at 12:14

## 2021-06-02 RX ADMIN — ACETAMINOPHEN 975 MG: 325 TABLET, FILM COATED ORAL at 21:42

## 2021-06-02 RX ADMIN — GABAPENTIN 400 MG: 400 CAPSULE ORAL at 16:18

## 2021-06-02 RX ADMIN — HYDROMORPHONE HYDROCHLORIDE 4 MG: 4 TABLET ORAL at 05:08

## 2021-06-02 RX ADMIN — HYDROMORPHONE HYDROCHLORIDE 1 MG: 1 INJECTION, SOLUTION INTRAMUSCULAR; INTRAVENOUS; SUBCUTANEOUS at 13:19

## 2021-06-02 RX ADMIN — ACETAMINOPHEN 975 MG: 325 TABLET, FILM COATED ORAL at 13:19

## 2021-06-02 RX ADMIN — DIAZEPAM 5 MG: 10 INJECTION, SOLUTION INTRAMUSCULAR; INTRAVENOUS at 13:19

## 2021-06-02 RX ADMIN — ENOXAPARIN SODIUM 60 MG: 60 INJECTION SUBCUTANEOUS at 08:12

## 2021-06-02 RX ADMIN — CEFAZOLIN SODIUM 2000 MG: 2 SOLUTION INTRAVENOUS at 13:19

## 2021-06-02 RX ADMIN — ONDANSETRON 4 MG: 2 INJECTION INTRAMUSCULAR; INTRAVENOUS at 11:27

## 2021-06-02 RX ADMIN — CEFAZOLIN SODIUM 2000 MG: 2 SOLUTION INTRAVENOUS at 21:42

## 2021-06-02 RX ADMIN — HYDROMORPHONE HYDROCHLORIDE 1 MG: 1 INJECTION, SOLUTION INTRAMUSCULAR; INTRAVENOUS; SUBCUTANEOUS at 17:30

## 2021-06-02 RX ADMIN — GABAPENTIN 400 MG: 400 CAPSULE ORAL at 21:42

## 2021-06-02 RX ADMIN — MELATONIN TAB 3 MG 6 MG: 3 TAB at 21:42

## 2021-06-02 RX ADMIN — HYDROMORPHONE HYDROCHLORIDE 1 MG: 1 INJECTION, SOLUTION INTRAMUSCULAR; INTRAVENOUS; SUBCUTANEOUS at 01:00

## 2021-06-02 RX ADMIN — HYDROMORPHONE HYDROCHLORIDE 4 MG: 4 TABLET ORAL at 09:26

## 2021-06-02 RX ADMIN — INSULIN LISPRO 2 UNITS: 100 INJECTION, SOLUTION INTRAVENOUS; SUBCUTANEOUS at 18:04

## 2021-06-02 RX ADMIN — HYDROMORPHONE HYDROCHLORIDE 1 MG: 1 INJECTION, SOLUTION INTRAMUSCULAR; INTRAVENOUS; SUBCUTANEOUS at 10:28

## 2021-06-02 RX ADMIN — HYDROMORPHONE HYDROCHLORIDE 1 MG: 1 INJECTION, SOLUTION INTRAMUSCULAR; INTRAVENOUS; SUBCUTANEOUS at 21:42

## 2021-06-02 RX ADMIN — INSULIN LISPRO 2 UNITS: 100 INJECTION, SOLUTION INTRAVENOUS; SUBCUTANEOUS at 08:14

## 2021-06-02 RX ADMIN — TICAGRELOR 90 MG: 90 TABLET ORAL at 21:42

## 2021-06-02 RX ADMIN — HYDROMORPHONE HYDROCHLORIDE 1 MG: 1 INJECTION, SOLUTION INTRAMUSCULAR; INTRAVENOUS; SUBCUTANEOUS at 05:59

## 2021-06-02 RX ADMIN — DIAZEPAM 5 MG: 5 TABLET ORAL at 17:30

## 2021-06-02 RX ADMIN — CARVEDILOL 6.25 MG: 6.25 TABLET, FILM COATED ORAL at 08:12

## 2021-06-02 NOTE — PLAN OF CARE
Problem: Nutrition/Hydration-ADULT  Goal: Nutrient/Hydration intake appropriate for improving, restoring or maintaining nutritional needs  Description: Monitor and assess patient's nutrition/hydration status for malnutrition  Collaborate with interdisciplinary team and initiate plan and interventions as ordered  Monitor patient's weight and dietary intake as ordered or per policy  Utilize nutrition screening tool and intervene as necessary  Determine patient's food preferences and provide high-protein, high-caloric foods as appropriate       INTERVENTIONS:  - Monitor oral intake, urinary output, labs, and treatment plans  - Assess nutrition and hydration status and recommend course of action  - Evaluate amount of meals eaten  - Assist patient with eating if necessary   - Allow adequate time for meals  - Recommend/ encourage appropriate diets, oral nutritional supplements, and vitamin/mineral supplements  - Order, calculate, and assess calorie counts as needed  - Recommend, monitor, and adjust tube feedings and TPN/PPN based on assessed needs  - Assess need for intravenous fluids  - Provide specific nutrition/hydration education as appropriate  - Include patient/family/caregiver in decisions related to nutrition  Outcome: Progressing     Problem: PAIN - ADULT  Goal: Verbalizes/displays adequate comfort level or baseline comfort level  Description: Interventions:  - Encourage patient to monitor pain and request assistance  - Assess pain using appropriate pain scale  - Administer analgesics based on type and severity of pain and evaluate response  - Implement non-pharmacological measures as appropriate and evaluate response  - Consider cultural and social influences on pain and pain management  - Notify physician/advanced practitioner if interventions unsuccessful or patient reports new pain  Outcome: Progressing     Problem: INFECTION - ADULT  Goal: Absence or prevention of progression during hospitalization  Description: INTERVENTIONS:  - Assess and monitor for signs and symptoms of infection  - Monitor lab/diagnostic results  - Monitor all insertion sites, i e  indwelling lines, tubes, and drains  - Monitor endotracheal if appropriate and nasal secretions for changes in amount and color  - [place] appropriate cooling/warming therapies per order  - Administer medications as ordered  - Instruct and encourage patient and family to use good hand hygiene technique  - Identify and instruct in appropriate isolation precautions for identified infection/condition  Outcome: Progressing     Problem: SAFETY ADULT  Goal: Patient will remain free of falls  Description: INTERVENTIONS:  - Assess patient frequently for physical needs  -  Identify cognitive and physical deficits and behaviors that affect risk of falls    -  [place] fall precautions as indicated by assessment   - Educate patient/family on patient safety including physical limitations  - Instruct patient to call for assistance with activity based on assessment  - Modify environment to reduce risk of injury  - Consider OT/PT consult to assist with strengthening/mobility  Outcome: Progressing  Goal: Maintain or return to baseline ADL function  Description: INTERVENTIONS:  -  Assess patient's ability to carry out ADLs; assess patient's baseline for ADL function and identify physical deficits which impact ability to perform ADLs (bathing, care of mouth/teeth, toileting, grooming, dressing, etc )  - Assess/evaluate cause of self-care deficits   - Assess range of motion  - Assess patient's mobility; develop plan if impaired  - Assess patient's need for assistive devices and provide as appropriate  - Encourage maximum independence but intervene and supervise when necessary  - Involve family in performance of ADLs  - Assess for home care needs following discharge   - Consider OT consult to assist with ADL evaluation and planning for discharge  - Provide patient education as appropriate  Outcome: Progressing  Goal: Maintain or return mobility status to optimal level  Description: INTERVENTIONS:  - Assess patient's baseline mobility status (ambulation, transfers, stairs, etc )    - Identify cognitive and physical deficits and behaviors that affect mobility  - Identify mobility aids required to assist with transfers and/or ambulation (gait belt, sit-to-stand, lift, walker, cane, etc )  - [place] fall precautions as indicated by assessment  - Record patient progress and toleration of activity level on Mobility SBAR; progress patient to next Phase/Stage  - Instruct patient to call for assistance with activity based on assessment  - Consider rehabilitation consult to assist with strengthening/weightbearing, etc   Outcome: Progressing     Problem: DISCHARGE PLANNING  Goal: Discharge to home or other facility with appropriate resources  Description: INTERVENTIONS:  - Identify barriers to discharge w/patient and caregiver  - Arrange for needed discharge resources and transportation as appropriate  - Identify discharge learning needs (meds, wound care, etc )  - Arrange for interpretive services to assist at discharge as needed  - Refer to Case Management Department for coordinating discharge planning if the patient needs post-hospital services based on physician/advanced practitioner order or complex needs related to functional status, cognitive ability, or social support system  Outcome: Progressing     Problem: Prexisting or High Potential for Compromised Skin Integrity  Goal: Skin integrity is maintained or improved  Description: INTERVENTIONS:  - Identify patients at risk for skin breakdown  - Assess and monitor skin integrity  - Assess and monitor nutrition and hydration status  - Monitor labs   - Assess for incontinence   - Turn and reposition patient  - Assist with mobility/ambulation  - Relieve pressure over bony prominences  - Avoid friction and shearing  - Provide appropriate hygiene as needed including keeping skin clean and dry  - Evaluate need for skin moisturizer/barrier cream  - Collaborate with interdisciplinary team   - Patient/family teaching  - Consider wound care consult   Outcome: Progressing     Problem: Potential for Falls  Goal: Patient will remain free of falls  Description: INTERVENTIONS:  - Assess patient frequently for physical needs  -  Identify cognitive and physical deficits and behaviors that affect risk of falls    -  [place] fall precautions as indicated by assessment   - Educate patient/family on patient safety including physical limitations  - Instruct patient to call for assistance with activity based on assessment  - Modify environment to reduce risk of injury  - Consider OT/PT consult to assist with strengthening/mobility  Outcome: Progressing

## 2021-06-02 NOTE — PROGRESS NOTES
Progress Note - Julia Bojorquez 37 y o  male MRN: [id number]    Unit/Bed#: [facility] 840-02 Encounter: [id number]      Assessment:  38 y/o M p/w LLE cellulitis, L thigh fluid collection, s/p bedside I&D on 5/26, OR I&D on 5/26, L thigh washout/closure on 5/28    Vss  Afebrile  LLE cellulitis is stable  Dressing replaced and pictures placed in chart today  Plan:  Continue diabetic diet  Continue ancef, appreciate ID  Continue lovenox  Continue to elevate left lower extremity and compression w ace bandages  Subjective:   Still complaints of pain to LLE  Although slightly improved  Denied chest pain or shortness of breath  Objective:     Vitals: Blood pressure 116/61, pulse 76, temperature 98 7 °F (37 1 °C), resp  rate 17, height 5' 7" (1 702 m), weight (!) 168 kg (370 lb 13 oz), SpO2 96 %  ,Body mass index is 58 08 kg/m²  Intake/Output Summary (Last 24 hours) at 6/2/2021 0209  Last data filed at 6/1/2021 2152  Gross per 24 hour   Intake 410 ml   Output 3350 ml   Net -2940 ml       Physical Exam  General: NAD  HEENT: NC/AT  MMM  Cv: RRR  Lungs: normal effort  Ab: Soft, NT/ND  Ex: LLE cellulitis stable  LLE elevated and wrapped with kerlix and ace bandages     Neuro: AAOx3    Scheduled Meds:  Current Facility-Administered Medications   Medication Dose Route Frequency Provider Last Rate    acetaminophen  975 mg Oral [facility] Megan Oneal PA-C      aspirin  81 mg Oral Daily Megan Oneal PA-C      bisacodyl  10 mg Rectal Daily PRN Megan Oneal PA-C      carvedilol  6 25 mg Oral BID With Meals Megan Oneal PA-C      cefazolin  2,000 mg Intravenous Q8H Mario Tatum MD 2,000 mg (06/01/21 2200)    enoxaparin  60 mg Subcutaneous Q12H [facility] Megan Oneal PA-C      gabapentin  400 mg Oral TID Samara Flowers PA-C      HYDROmorphone  1 mg Intravenous Q2H PRN Megan Oneal PA-C      HYDROmorphone  2 mg Oral Q4H PRN Megan Oneal PA-C      Or    HYDROmorphone  4 mg Oral Q4H PRN Cheryl Medley PA-C      insulin lispro  2-12 Units Subcutaneous TID AC Cheryl Medley PA-C      melatonin  6 mg Oral HS Cheryl Medley PA-C      methocarbamol  750 mg Oral Q6H [address] Cheryl Medley PA-C      ondansetron  4 mg Intravenous Q6H PRN Cheryl Medley PA-C      polyethylene glycol  17 g Oral Daily Edvin Sweet      senna-docusate sodium  2 tablet Oral BID Cheryl Medley PA-C      sodium chloride  2 spray Each Nare Q1H PRN Chip Haines MD      ticagrelor  90 mg Oral Q12H [address] Cheryl Medley PA-C       Continuous Infusions:   PRN Meds: bisacodyl    HYDROmorphone    HYDROmorphone **OR** HYDROmorphone    ondansetron    sodium chloride      Invasive Devices     Peripheral Intravenous Line            Peripheral IV 05/31/21 Right;Ventral (anterior) Forearm 1 day          Drain            Open Drain Left Thigh 4 days    Open Drain Left Thigh 4 days                Lab, Imaging and other studies: I have personally reviewed pertinent reports      VTE Pharmacologic Prophylaxis: Sequential compression device (Venodyne)   VTE Mechanical Prophylaxis: sequential compression device

## 2021-06-02 NOTE — WOUND OSTOMY CARE
Consult Note - Wound   Anthony Payne 37 y o  male MRN: [id number]  Unit/Bed#: [facility] 705-52 Encounter: [id number]        History and Present Illness:  Patient is a44 year old male, who presented with complaints of worsening LLE pain and fever, patient had surgical intervention during prior admission was discharged wit VNA and orders for every other day packing of wound  Patient is receptive to wound care nurse assessment of skin, he is in bed and able to turn self  BMI: Estimated body mass index is 57 2 kg/m² as calculated from the following:    Height as of this encounter: 5' 7" (1 702 m)  Weight as of this encounter: 166 kg (365 lb 3 2 oz)  History  Past Medical History:   Diagnosis Date    Coronary artery disease     [facility] mellitus [facility]     Heart disease     CAD   s/p ptca with 1 stent 2012    Hidradenitis suppurativa     groin    Hyperlipidemia     Hypertension     MI (myocardial infarction) ([facility] )     " silent " M I  in the past    Morbid obesity with BMI of 50 0-59 9, adult ([facility] )     Myocardial infarction ([facility] )     Nicotine dependence     Obesity     Pilonidal cyst      Past Surgical History:   Procedure Laterality Date    CORONARY ANGIOPLASTY WITH STENT PLACEMENT      INCISION AND DRAINAGE OF WOUND N/A 1/24/2017    Procedure: INCISION AND DRAINAGE (I&D) BUTTOCK, PILONIDAL CYST;  Surgeon: Sherley Mansfield MD;  Location: [address] [place] [address];  Service:    [place] LEG SURGERY Left     I&D of left leg 2012    CT EXC SKIN BENIG >4 CM TRUNK,ARM,LEG Left 4/6/2021    Procedure: EXCISION THIGH MASS;  Surgeon: Ariela Jones MD;  [facility];  Service: General    CT NEG PRESS WOUND TX, < 50 CM Left 4/6/2021    Procedure: APPLICATION VAC DRESSING THIGH;  Surgeon: Ariela Jones MD;  [facility];  Service: General    WOUND DEBRIDEMENT Left 4/17/2021    Procedure: DEBRIDEMENT WOUND AND DRESSING CHANGE ([address] OUT);   Surgeon: Michelle Short DO;  [facility];  Service: General    WOUND DEBRIDEMENT Left 4/22/2021    Procedure: DEBRIDEMENT LOWER EXTREMITY [facility] OUT), left groin and thigh;  Surgeon: Peyton Young MD;  [facility];  Service: General    WOUND DEBRIDEMENT Left 5/26/2021    Procedure: DEBRIDEMENT LOWER EXTREMITY [facility] OUT); Surgeon:  Tara Nguyen DO;  [facility];  Service: General    WOUND DEBRIDEMENT Left 5/28/2021    Procedure: Washout left thigh wound and closure;  Surgeon: Bing Shah DO;  [facility];  Service: General       Problem List:   Patient Active Problem List   Diagnosis    Abnormal liver enzymes    CAD (coronary artery disease)    Dyslipidemia    HTN (hypertension)    Morbid obesity ([place] [address] )    Pilonidal cyst    DM type 2, uncontrolled, with neuropathy ([place] [address] )    Type 1 non-ST elevation myocardial infarction (NSTEMI) ([place] [address] )    Left groin mass    Skin abscess    Encounter for other preprocedural examination    Morbid (severe) obesity due to excess calories ([place] [address] )    Class 3 severe obesity due to excess calories with serious comorbidity and body mass index (BMI) of 50 0 to 59 9 in adult ([facility] )    Hypertriglyceridemia    Dyslipidemia (high LDL; low HDL)    Sepsis (HCC)    Shortness of breath    Difficult intubation    Diabetic neuropathy (HCC)       Medications:  Current Facility-Administered Medications   Medication Dose Route Frequency Provider Last Rate Last Admin    acetaminophen (TYLENOL) tablet 975 mg  975 mg Oral Q8H [address] Cheryl Medley PA-C   975 mg at 06/02/21 1319    aspirin (ECOTRIN LOW STRENGTH) EC tablet 81 mg  81 mg Oral Daily Cheryl Medley PA-C   81 mg at 06/02/21 1661    bisacodyl (DULCOLAX) rectal suppository 10 mg  10 mg Rectal Daily PRN Cheryl Medley PA-C        carvedilol (COREG) tablet 6 25 mg  6 25 mg Oral BID With Meals Cheryl Medley PA-C   6 25 mg at 06/02/21 9205    ceFAZolin (ANCEF) IVPB (premix in dextrose) 2,000 mg 50 mL  2,000 mg Intravenous Q8H Travon Glover  mL/hr at 06/02/21 1319 2,000 mg at 06/02/21 1319    enoxaparin (LOVENOX) subcutaneous injection 60 mg  60 mg Subcutaneous Q12H [address] Nadine Agarwal PA-C   60 mg at 06/02/21 5247    gabapentin (NEURONTIN) capsule 400 mg  400 mg Oral TID Jose David Tate PA-C   400 mg at 06/02/21 0622    HYDROmorphone (DILAUDID) injection 1 mg  1 mg Intravenous Q2H PRN Nadine Agarwal PA-C   1 mg at 06/02/21 1319    HYDROmorphone (DILAUDID) tablet 2 mg  2 mg Oral Q4H PRN Nadine Agarwal PA-C   2 mg at 05/27/21 1052    Or    HYDROmorphone (DILAUDID) tablet 4 mg  4 mg Oral Q4H PRN Nadine Agarwal PA-C   4 mg at 06/02/21 3839    insulin lispro (HumaLOG) 100 units/mL subcutaneous injection 2-12 Units  2-12 Units Subcutaneous TID [facility] Nadine Agarwal PA-C   2 Units at 06/02/21 3647    melatonin tablet 6 mg  6 mg Oral HS Nadine Agarwal PA-C   Stopped at 06/01/21 2319    methocarbamol (ROBAXIN) tablet 750 mg  750 mg Oral Q6H [address] Nadine Agarwal PA-C   750 mg at 06/02/21 1214    nystatin (MYCOSTATIN) powder 1 application  1 application Topical BID Jose David Tate PA-C        ondansetron [facility]) injection 4 mg  4 mg Intravenous Q6H PRN Nadine Agarwal PA-C   4 mg at 06/02/21 1127    polyethylene glycol (MIRALAX) packet 17 g  17 g Oral Daily Nadine Agarwal PA-C   17 g at 06/02/21 1823    senna-docusate sodium (SENOKOT S) 8 6-50 mg per tablet 2 tablet  2 tablet Oral BID Nadine Agarwal PA-C   2 tablet at 06/02/21 8660    sodium chloride (OCEAN) 0 65 % nasal spray 2 spray  2 spray Each Nare Q1H PRN Luis Weaver MD   2 spray at 05/29/21 4068    ticagrelor (BRILINTA) tablet 90 mg  90 mg Oral Q12H [address] Nadine Agarwal PA-C   90 mg at 06/02/21 4823       Assessment Findings:   1- Bilateral abdominal fold and inner thigh with chronically draining hidradenitis lesions  ,  multiple sinus tracts are noted, not all draining and with mild to moderate inflammation        2-Mid sacrum with chronically draining pilonidal cyst area, previously excise  however has re-occurred twice with ongoing drainage and touch of candidiasis  L medial inner with post surgical wound and under general surgery's care, heels are intact and patient is continent  Skin/Wound Care:  1-Cleanse bilateral abdominal fold and inner thigh areas affected with Hydradenitis with Chlorhexidine soap and water, pat dry then place cut to fit silver alginate daily  2-Cleanse sacrum chronic draining pilonidal cyst site with soap and water, dust with nystatin powder then place alginate rope daily  3-Elevate heels to offload pressure  4-Turn/resposition q2h for pressure re-distribution on skin  5-Moisture skin daily with skin nourishing cream   6-Nystatin powder to bilateral inner thigh and sacrum, BID     Wounds:  Wound 05/26/21 Incision Thigh Left (Active)   Wound Description Dry; Intact;Fragile;Pink 06/02/21 0812   Sarika-wound Assessment Intact; Swelling;Edema 06/02/21 0812   Wound Site Closure Sutures 06/02/21 0812   Drainage Amount None 06/01/21 1930   Drainage Description Serosanguineous 06/01/21 0400   Treatments Elevated 06/02/21 0500   Dressing ABD;Calcium Alginate 06/02/21 0812   Packing- # inserted 1 05/26/21 1916   Dressing Changed Changed 06/02/21 0812   Patient Tolerance Tolerated well 06/02/21 0812   Dressing Status Clean;Dry; Intact 06/02/21 0812       Wound 05/26/21 Surgical Other (Comment) Groin Left (Active)   Wound Description Dry; Intact;Fragile;Pink 06/02/21 0812   Sarika-wound Assessment Intact; Swelling;Edema 06/02/21 0812   Drainage Amount None 06/02/21 0500   Drainage Description Serosanguineous; Serous 06/01/21 2343   Dressing Calcium Alginate;ABD 06/02/21 0812   Dressing Changed Changed 06/02/21 0812   Patient Tolerance Tolerated well 06/01/21 2343   Dressing Status Clean;Dry; Intact 06/02/21 0812       Wound 06/01/21 Other (comment) Cellulitis Pretibial Left (Active)   Wound Description Light purple;Pink;Fragile;Edema; Other (Comment) 06/02/21 0812   Sarika-wound Assessment Pink;Fragile;Edema 06/02/21 0812   Drainage Amount None 06/02/21 0500   Treatments Site care;Elevated; Ice applied 06/02/21 2394   Dressing Dry dressing 06/02/21 0812   Dressing Changed Changed by provider 06/02/21 0812   Dressing Status Clean;Dry; Intact 06/02/21 0812       Wound 06/02/21 Hip Anterior;Right (Active)   Wound Image   06/02/21 1028   Wound Description Intact; Pink 06/02/21 0812   Sarika-wound Assessment Intact; Excoriated 06/02/21 0812   Treatments Cleansed;Site care 06/02/21 0812   Dressing Calcium Alginate 06/02/21 0812       Wound 06/02/21 Sacrum (Active)   Wound Image   06/02/21 1034   Wound Description Intact; Pink 06/02/21 0812   Sarika-wound Assessment Excoriated;Pink 06/02/21 0812   Wound Length (cm) 0 8 cm 06/02/21 1034   Wound Width (cm) 0 8 cm 06/02/21 1034   Wound Surface Area (cm^2) 0 64 cm^2 06/02/21 1034   Treatments Cleansed;Site care 06/02/21 0812   Dressing Calcium Alginate 06/02/21 0812   Dressing Status Clean;Dry; Intact 06/02/21 0812         Wound care to continue following, please tiger text team with questions and concern, orders are placed        Marialuisa Hirsch, RN, BSN, [facility]

## 2021-06-02 NOTE — PROGRESS NOTES
Progress Note - Infectious Disease   Jorden Suazo 37 y o  male MRN: [id number]  [facility] Encounter: [id number]      Impression/Recommendations:  1  Sepsis, POA  Fever, WBC  Evolving to shock  Due to # 2/3  No other appreciable source  ROS and exam otherwise benign  Blood cultures negative  Clinically stable and nontoxic  Overall clinically improving  Now afebrile, off pressors  Suspect low-level WBC likely multifactorial, largely due to open wounds  Rec:  ? Continue antibiotics as below  ? Follow temperatures closely  ? Recheck CBC in a m   ? Supportive care as per primary service     2  Left thigh superinfected seroma  In setting of # 4  Status post I and D 5/26  Cultures with group G strep  Clinically improved on repeat CT  Rec:  ? Continue antibiotics as below  ? Postoperative care per surgery     3  Left lower extremity cellulitis  Overall appearance consistent with known streptococcal infection  Suspect worsening symptoms, erythema due to uncontrolled edema, toxin mediated skin changes  Doubt MDRO or other pathogen  Improving with ACE wrap  Rec:  ? Continue cefazolin for at least another 24 hours  ? Applied ACE compression wrap and advised leg elevation  ? Serial exams     4  History of left groin hidradenitis  Status post excision April 2021 with chronic wound      5   Candida intertrigo  Multifactorial   Rec:  · Continue [address] per nursing  6   MO  Risk factor for infection, poor wound healing     7  Poorly controlled DM  With hyperglycemia  Recent A1c 03/2021 7 7  Risk factor for infection      The above impression and plan was discussed in detail with KATERINE Manley with surgery      Antibiotics:  Cefazolin  Antibiotics # 8    Subjective:  Patient seen on AM rounds  Denies fevers, chills, sweats, nausea, vomiting, or diarrhea  24 Hour Events:  No documented fevers, chills, sweats, nausea, vomiting, or diarrhea    Had ACE wrap removal this AM - edema and erythema is approved  Objective:  Vitals:  Temp:  [97 5 °F (36 4 °C)-98 7 °F (37 1 °C)] 98 5 °F (36 9 °C)  HR:  [66-79] 73  Resp:  [16-18] 18  BP: (109-129)/(58-87) 109/64  SpO2:  [92 %-96 %] 94 %  Temp (24hrs), Av 4 °F (36 9 °C), Min:97 5 °F (36 4 °C), Max:98 7 °F (37 1 °C)  Current: Temperature: 98 5 °F (36 9 °C)    Physical Exam:   General:  No acute distress  Psychiatric:  Awake and alert  Pulmonary:  Normal respiratory excursion without accessory muscle use  Abdomen:  Soft, nontender  Extremities:  Wound pics from surgery reviewed:  Improved edema and erythema  Evolving superficial bullae with darker purple changes  Skin:  No rashes    Lab Results:  I have personally reviewed pertinent labs  Results from last 7 days   Lab Units 21  0505 21  0514 21  0520  21  1849   POTASSIUM mmol/L 4 5 4 3 4 1   < >  --    CHLORIDE mmol/L 101 101 105   < >  --    CO2 mmol/L 34* 32 28   < >  --    CO2, I-STAT mmol/L  --   --   --   --  26   BUN mg/dL 13 15 15   < >  --    CREATININE mg/dL 0 68 0 77 0 84   < >  --    EGFR ml/min/1 73sq m 119 114 110   < >  --    GLUCOSE, ISTAT mg/dl  --   --   --   --  135   CALCIUM mg/dL 9 4 9 0 8 7   < >  --     < > = values in this interval not displayed  Results from last 7 days   Lab Units 21  0505 21  0514 21  0520   WBC Thousand/uL 15 31* 14 26* 13 49*   HEMOGLOBIN g/dL 8 5* 9 0* 8 3*   PLATELETS Thousands/uL 617* 594* 475*     Results from last 7 days   Lab Units 21  1909 21  1844   GRAM STAIN RESULT  Rare Polys*  Rare Gram positive cocci in clusters* Rare Polys*  Rare Gram positive cocci in pairs*   WOUND CULTURE  2+ Growth of Beta Hemolytic Streptococcus Group G* 2+ Growth of Beta Hemolytic Streptococcus Group G*       Imaging Studies:   I have personally reviewed pertinent imaging study reports and images in PACS  EKG, Pathology, and Other Studies:   I have personally reviewed pertinent reports

## 2021-06-02 NOTE — PROGRESS NOTES
Progress Note - Acute Pain Service    Crystal Boone 37 y o  male MRN: [id number]  Unit/Bed#: [facility] 804-33 Encounter: [id number]      Assessment:   Principal Problem:    Sepsis ([facility] )  Active Problems:    Shortness of breath    Difficult intubation    Diabetic neuropathy ([facility] )    Crystal Boone is a 37 y o  male who presents with fevers and worsening left lower extremity/groin pain  History of surgical excision of left groin hidradenitis  I&D of collection of left lower extremity done in the ED  Patient went to the OR for a left thigh wound washout on 05/26/2021 and again on 05/28/2021 for washout and closure  Plan:   · Tylenol 975 mg every 8 hours scheduled   · Gabapentin 400 mg 3 times a day, increase yesterday   · Discontinue Robaxin   · Valium 5 mg IV x1 now then start Valium 5 mg oral every 6 hours scheduled  · Oral Dilaudid 2 mg every 4 hours as needed for moderate pain   · Oral Dilaudid 4 mg every 4 hours as needed for severe pain  · Dilaudid 1 mg IV every 2 hours as needed for breakthrough pain  · Will attempt to wean IV opioids in the next 24 hours  · Ok for one time dose of IV Dilaudid prior to PT    Bowel Management  · MiraLax daily   · Senokot S  2 times a day   · Bisacodyl daily as needed     Treatment recommendations discussed with primary care service  [facility] will continue to follow  Please call [id number] / [facility] or [facility] (/ between [phone] and on weekends) with questions or concerns    Pain History  Current pain location(s): left groin, left LE  Pain Scale:   9-10  Quality: sharp and burning   24 hour history:   Patient resting in bed, reporting severe left lower extremity pain which was exacerbated post physical therapy session  Required additional dose of 0 5 mg of IV Dilaudid which provided mild improvement in pain  Patient stated that prior to physical therapy pain was tolerable on current medication regimen      Opioid requirement previous 24 hours: Oral Dilaudid 16 mg, IV Dilaudid 8 5 mg    Meds/Allergies   all current active meds have been reviewed and current meds:   Current Facility-Administered Medications   Medication Dose Route Frequency    acetaminophen (TYLENOL) tablet 975 mg  975 mg Oral Q8H [address]    aspirin (ECOTRIN LOW STRENGTH) EC tablet 81 mg  81 mg Oral Daily    bisacodyl (DULCOLAX) rectal suppository 10 mg  10 mg Rectal Daily PRN    carvedilol (COREG) tablet 6 25 mg  6 25 mg Oral BID With Meals    ceFAZolin (ANCEF) IVPB (premix in dextrose) 2,000 mg 50 mL  2,000 mg Intravenous Q8H    enoxaparin (LOVENOX) subcutaneous injection 60 mg  60 mg Subcutaneous Q12H [address]    gabapentin (NEURONTIN) capsule 400 mg  400 mg Oral TID    HYDROmorphone (DILAUDID) injection 1 mg  1 mg Intravenous Q2H PRN    HYDROmorphone (DILAUDID) tablet 2 mg  2 mg Oral Q4H PRN    Or    HYDROmorphone (DILAUDID) tablet 4 mg  4 mg Oral Q4H PRN    insulin lispro (HumaLOG) 100 units/mL subcutaneous injection 2-12 Units  2-12 Units Subcutaneous TID AC    melatonin tablet 6 mg  6 mg Oral HS    methocarbamol (ROBAXIN) tablet 750 mg  750 mg Oral Q6H [address]    ondansetron (ZOFRAN) injection 4 mg  4 mg Intravenous Q6H PRN    polyethylene glycol (MIRALAX) packet 17 g  17 g Oral Daily    senna-docusate sodium (SENOKOT S) 8 6-50 mg per tablet 2 tablet  2 tablet Oral BID    sodium chloride (OCEAN) 0 65 % nasal spray 2 spray  2 spray Each Nare Q1H PRN    ticagrelor (BRILINTA) tablet 90 mg  90 mg Oral Q12H [address]       Allergies   Allergen Reactions    Amoxicillin Hives       Objective     Temp:  [97 5 °F (36 4 °C)-98 7 °F (37 1 °C)] 98 5 °F (36 9 °C)  HR:  [66-79] 73  Resp:  [16-18] 18  BP: (109-121)/(58-87) 109/64    Physical Exam  Vitals signs reviewed  Constitutional:       General: He is awake  Appearance: He is not ill-appearing, toxic-appearing or diaphoretic  HENT:      Head: Normocephalic and atraumatic        Nose: Nose normal    Neck:      Musculoskeletal: Normal range of motion  Pulmonary:      Effort: Pulmonary effort is normal  No tachypnea, bradypnea or respiratory distress  Musculoskeletal:      Left lower leg: Edema present  Neurological:      Mental Status: He is alert and oriented to person, place, and time  Mental status is at baseline  Psychiatric:         Attention and Perception: Attention normal          Mood and Affect: Mood normal  Mood is not anxious  Speech: Speech normal          Behavior: Behavior normal  Behavior is cooperative  Lab Results:   Results from last 7 days   Lab Units 06/02/21  0505   WBC Thousand/uL 15 31*   HEMOGLOBIN g/dL 8 5*   HEMATOCRIT % 28 6*   PLATELETS Thousands/uL 617*      Results from last 7 days   Lab Units 06/02/21  0505  05/26/21  1849   POTASSIUM mmol/L 4 5   < >  --    CHLORIDE mmol/L 101   < >  --    CO2 mmol/L 34*   < >  --    CO2, I-STAT mmol/L  --   --  26   BUN mg/dL 13   < >  --    CREATININE mg/dL 0 68   < >  --    CALCIUM mg/dL 9 4   < >  --    GLUCOSE, ISTAT mg/dl  --   --  135    < > = values in this interval not displayed  Please note that the APS provides consultative services regarding pain management only  With the exception of ketamine and epidural infusions and except when indicated, final decisions regarding starting or changing doses of analgesic medications are at the discretion of the consulting service  Off hours consultation and/or medication management is generally not available      PETRA Pisano  Acute Pain Service

## 2021-06-02 NOTE — QUICK NOTE
Nurse-Patient-Provider rounds were completed with the patient's nurse today, Miah Moe  We discussed the plan is to assessment at bedside  Consult wound care team at this time  Follow up ID recommendations  Continue to get up and out of bed  Continue Ancef at this time  Local wound care as indicated  We reviewed all of the invasive devices/lines/telemetry orders   - None  DVT Prophylaxis:  SCDs and Lovenox    Pain Assessment / Plan:  - Continue current analgesic regimen  Mobility Assessment / Plan:  - Activity as tolerated  Goals / Barriers for discharge:  - Continue management for cellulitic extremity  - Follow-up ID recommendations  - Local wound care  - Wound Care Consultation  Case management following; case and discharge needs discussed  All questions and concerns were addressed  I spent greater than 12 minutes reviewing the plan with the patient and the nurse, and coordinating care for the day      Destini Solomon PA-C  6/2/2021

## 2021-06-02 NOTE — PHYSICAL THERAPY NOTE
Physical Therapy Progress Note     06/02/21 1116   PT Last Visit   PT Visit Date 06/02/21   Note Type   Note Type Treatment   Pain Assessment   Pain Assessment Tool 0-10   Pain Score Worst Possible Pain   [facility] Pain Intervention(s) Repositioned; Ambulation/increased activity; Elevated; Rest   Restrictions/Precautions   Other Precautions Pain; Fall Risk   Subjective   Subjective Pt encountered supine in bed, pleasant and agreeable to attempt OOB mobitliy  Reports LLE pain at rest, which increased significantly when attempting to place into dependent position  Pt then reported increased nauasea due to pain  Once returned to supine, pt reported continued pain, but did not appear to be in significant distress compared to sitting EOB  RN informed  Bed Mobility   Supine to Sit 5  Supervision   Additional items Assist x 1   Sit to Supine 4  Minimal assistance   Additional items Assist x 1;LE management   Balance   Static Sitting Fair +   Dynamic Sitting Fair   Endurance Deficit   Endurance Deficit Yes   Endurance Deficit Description LLE pain   Activity Tolerance   Activity Tolerance Patient limited by pain   Nurse Anish Bunn [id number], RN   Assessment   Prognosis Good   Problem List Decreased strength;Decreased range of motion;Decreased endurance; Impaired balance;Decreased mobility;Pain;Decreased skin integrity;Obesity   Assessment Pt significantly limited by pain at this time, and was unable to attempt OOB mobility due to the same  Pt was able to perform all bed mobiity tasks without significant need for assist, which was only provided to guard LLE when returning to supine position  Pt required LLE management in sitting while attempting to place it into dependent position on the floor, but pt could not tolerate for >1 minute    pt demonstrated AROM throughout LLE whiel performing tasks, and was able to successfully bear weight with foot flat on floor in sitting, then again when boosting himelf in bed prior to conclusion of session  Encouraged pt to sit upright as tolerated outside of therapy to improve respiratory status, and attempt LLE dangling as able either EOB or in chair position in bed to improve tolerance to these tasks in preparation for OOB mobility in upcoming therapy treatments  Anticipate pt will make significant progress with reduced pain  POC and discharge remain appropriate until pt able to demonstrate progress towards goals as established in eval    Barriers to Discharge Inaccessible home environment;Decreased caregiver support   Goals   Patient Goals for pain to go away   STG Expiration Date 06/09/21   PT Treatment Day 1   Plan   Treatment/Interventions Functional transfer training;LE strengthening/ROM; Elevations; Therapeutic exercise; Endurance training;Patient/family training;Equipment eval/education; Bed mobility;Gait training   Progress Progressing toward goals   PT Frequency Other (Comment)  (3-6x/week)   Recommendation   PT Discharge Recommendation Post acute rehabilitation services   Equipment Recommended [facility] Recommended HD Bariatric wheeled walker   [address]   Turning in Bed Without Bedrails 4   Lying on Back to Sitting on Edge of Flat Bed 4   Moving Bed to Chair 3   Standing Up From Chair 3   Walk in Room 3   Climb 3-5 Stairs 2   Basic Mobility Inpatient Raw Score 19   Basic Mobility Standardized Score 42 48   The patient's AM-PAC Basic Mobility Inpatient Short Form Raw Score is 19, Standardized Score is 42 48  A standardized score less than 42 9 suggests the patient may benefit from discharge to post-acute rehabilitation services  Please also refer to the recommendation of the Physical Therapist for safe discharge planning            Dariel Cabrera PTA

## 2021-06-02 NOTE — PLAN OF CARE
Problem: PHYSICAL THERAPY ADULT  Goal: Performs mobility at highest level of function for planned discharge setting  See evaluation for individualized goals  Description: Treatment/Interventions: Functional transfer training, LE strengthening/ROM, Elevations, Therapeutic exercise, Endurance training, Patient/family training, Equipment eval/education, Bed mobility, Gait training, Spoke to nursing, OT  Equipment Recommended: Eulalia Socks       See flowsheet documentation for full assessment, interventions and recommendations  Outcome: Progressing  Note: Prognosis: Good  Problem List: Decreased strength, Decreased range of motion, Decreased endurance, Impaired balance, Decreased mobility, Pain, Decreased skin integrity, Obesity  Assessment: Pt significantly limited by pain at this time, and was unable to attempt OOB mobility due to the same  Pt was able to perform all bed mobiity tasks without significant need for assist, which was only provided to guard LLE when returning to supine position  Pt required LLE management in sitting while attempting to place it into dependent position on the floor, but pt could not tolerate for >1 minute  pt demonstrated AROM throughout LLE whiel performing tasks, and was able to successfully bear weight with foot flat on floor in sitting, then again when boosting himelf in bed prior to conclusion of session  Encouraged pt to sit upright as tolerated outside of therapy to improve respiratory status, and attempt LLE dangling as able either EOB or in chair position in bed to improve tolerance to these tasks in preparation for OOB mobility in upcoming therapy treatments  Anticipate pt will make significant progress with reduced pain    POC and discharge remain appropriate until pt able to demonstrate progress towards goals as established in eval   Barriers to Discharge: [address] home environment, Decreased caregiver support        PT Discharge Recommendation: Post acute rehabilitation services     PT - OK to Discharge: Yes(To rehab when medically cleared)    See flowsheet documentation for full assessment

## 2021-06-03 LAB
ANION GAP SERPL CALCULATED.3IONS-SCNC: 5 MMOL/L (ref 4–13)
BASOPHILS # BLD MANUAL: 0 THOUSAND/UL (ref 0–0.1)
BASOPHILS NFR MAR MANUAL: 0 % (ref 0–1)
BUN SERPL-MCNC: 16 MG/DL (ref 5–25)
CALCIUM SERPL-MCNC: 9.6 MG/DL (ref 8.3–10.1)
CHLORIDE SERPL-SCNC: 101 MMOL/L (ref 100–108)
CO2 SERPL-SCNC: 32 MMOL/L (ref 21–32)
CREAT SERPL-MCNC: 0.72 MG/DL (ref 0.6–1.3)
EOSINOPHIL # BLD MANUAL: 0.28 THOUSAND/UL (ref 0–0.4)
EOSINOPHIL NFR BLD MANUAL: 2 % (ref 0–6)
ERYTHROCYTE [DISTWIDTH] IN BLOOD BY AUTOMATED COUNT: 16.7 % (ref 11.6–15.1)
GFR SERPL CREATININE-BSD FRML MDRD: 117 ML/MIN/1.73SQ M
GLUCOSE SERPL-MCNC: 130 MG/DL (ref 65–140)
GLUCOSE SERPL-MCNC: 133 MG/DL (ref 65–140)
GLUCOSE SERPL-MCNC: 144 MG/DL (ref 65–140)
GLUCOSE SERPL-MCNC: 198 MG/DL (ref 65–140)
HCT VFR BLD AUTO: 30 % (ref 36.5–49.3)
HGB BLD-MCNC: 8.8 G/DL (ref 12–17)
LYMPHOCYTES # BLD AUTO: 0.98 THOUSAND/UL (ref 0.6–4.47)
LYMPHOCYTES # BLD AUTO: 7 % (ref 14–44)
MCH RBC QN AUTO: 25.4 PG (ref 26.8–34.3)
MCHC RBC AUTO-ENTMCNC: 29.3 G/DL (ref 31.4–37.4)
MCV RBC AUTO: 87 FL (ref 82–98)
MONOCYTES # BLD AUTO: 0.84 THOUSAND/UL (ref 0–1.22)
MONOCYTES NFR BLD: 6 % (ref 4–12)
NEUTROPHILS # BLD MANUAL: 11.73 THOUSAND/UL (ref 1.85–7.62)
NEUTS BAND NFR BLD MANUAL: 3 % (ref 0–8)
NEUTS SEG NFR BLD AUTO: 81 % (ref 43–75)
NRBC BLD AUTO-RTO: 0 /100 WBCS
NRBC BLD AUTO-RTO: 1 /100 WBC (ref 0–2)
PLATELET # BLD AUTO: 677 THOUSANDS/UL (ref 149–390)
PLATELET BLD QL SMEAR: ABNORMAL
PMV BLD AUTO: 9.1 FL (ref 8.9–12.7)
POLYCHROMASIA BLD QL SMEAR: PRESENT
POTASSIUM SERPL-SCNC: 4.5 MMOL/L (ref 3.5–5.3)
RBC # BLD AUTO: 3.47 MILLION/UL (ref 3.88–5.62)
RBC MORPH BLD: PRESENT
SODIUM SERPL-SCNC: 138 MMOL/L (ref 136–145)
VARIANT LYMPHS # BLD AUTO: 1 %
WBC # BLD AUTO: 13.97 THOUSAND/UL (ref 4.31–10.16)

## 2021-06-03 PROCEDURE — 99232 SBSQ HOSP IP/OBS MODERATE 35: CPT | Performed by: NURSE PRACTITIONER

## 2021-06-03 PROCEDURE — 85027 COMPLETE CBC AUTOMATED: CPT | Performed by: SURGERY

## 2021-06-03 PROCEDURE — 82948 REAGENT STRIP/BLOOD GLUCOSE: CPT

## 2021-06-03 PROCEDURE — 80048 BASIC METABOLIC PNL TOTAL CA: CPT | Performed by: SURGERY

## 2021-06-03 PROCEDURE — 99024 POSTOP FOLLOW-UP VISIT: CPT | Performed by: SURGERY

## 2021-06-03 PROCEDURE — 99232 SBSQ HOSP IP/OBS MODERATE 35: CPT | Performed by: INTERNAL MEDICINE

## 2021-06-03 PROCEDURE — 85007 BL SMEAR W/DIFF WBC COUNT: CPT | Performed by: SURGERY

## 2021-06-03 RX ORDER — HYDROMORPHONE HCL/PF 1 MG/ML
0.5 SYRINGE (ML) INJECTION EVERY 2 HOUR PRN
Status: DISCONTINUED | OUTPATIENT
Start: 2021-06-03 | End: 2021-06-06 | Stop reason: HOSPADM

## 2021-06-03 RX ORDER — HYDROMORPHONE HYDROCHLORIDE 4 MG/1
4 TABLET ORAL EVERY 4 HOURS PRN
Status: DISCONTINUED | OUTPATIENT
Start: 2021-06-03 | End: 2021-06-04

## 2021-06-03 RX ADMIN — NYSTATIN 1 APPLICATION: 100000 POWDER TOPICAL at 17:31

## 2021-06-03 RX ADMIN — NYSTATIN 1 APPLICATION: 100000 POWDER TOPICAL at 08:27

## 2021-06-03 RX ADMIN — DOCUSATE SODIUM AND SENNOSIDES 2 TABLET: 8.6; 5 TABLET ORAL at 17:26

## 2021-06-03 RX ADMIN — GABAPENTIN 400 MG: 400 CAPSULE ORAL at 22:35

## 2021-06-03 RX ADMIN — DOCUSATE SODIUM AND SENNOSIDES 2 TABLET: 8.6; 5 TABLET ORAL at 08:23

## 2021-06-03 RX ADMIN — HYDROMORPHONE HYDROCHLORIDE 1 MG: 1 INJECTION, SOLUTION INTRAMUSCULAR; INTRAVENOUS; SUBCUTANEOUS at 11:29

## 2021-06-03 RX ADMIN — MELATONIN TAB 3 MG 6 MG: 3 TAB at 22:35

## 2021-06-03 RX ADMIN — ASPIRIN 81 MG: 81 TABLET, COATED ORAL at 08:23

## 2021-06-03 RX ADMIN — HYDROMORPHONE HYDROCHLORIDE 4 MG: 4 TABLET ORAL at 13:36

## 2021-06-03 RX ADMIN — ACETAMINOPHEN 975 MG: 325 TABLET, FILM COATED ORAL at 22:35

## 2021-06-03 RX ADMIN — ENOXAPARIN SODIUM 60 MG: 60 INJECTION SUBCUTANEOUS at 22:35

## 2021-06-03 RX ADMIN — HYDROMORPHONE HYDROCHLORIDE 0.5 MG: 1 INJECTION, SOLUTION INTRAMUSCULAR; INTRAVENOUS; SUBCUTANEOUS at 22:36

## 2021-06-03 RX ADMIN — CARVEDILOL 6.25 MG: 6.25 TABLET, FILM COATED ORAL at 07:57

## 2021-06-03 RX ADMIN — CEFAZOLIN SODIUM 2000 MG: 2 SOLUTION INTRAVENOUS at 22:36

## 2021-06-03 RX ADMIN — HYDROMORPHONE HYDROCHLORIDE 1 MG: 1 INJECTION, SOLUTION INTRAMUSCULAR; INTRAVENOUS; SUBCUTANEOUS at 05:13

## 2021-06-03 RX ADMIN — DIAZEPAM 5 MG: 5 TABLET ORAL at 18:30

## 2021-06-03 RX ADMIN — GABAPENTIN 400 MG: 400 CAPSULE ORAL at 08:23

## 2021-06-03 RX ADMIN — CEFAZOLIN SODIUM 2000 MG: 2 SOLUTION INTRAVENOUS at 05:14

## 2021-06-03 RX ADMIN — ACETAMINOPHEN 975 MG: 325 TABLET, FILM COATED ORAL at 05:13

## 2021-06-03 RX ADMIN — INSULIN LISPRO 2 UNITS: 100 INJECTION, SOLUTION INTRAVENOUS; SUBCUTANEOUS at 12:26

## 2021-06-03 RX ADMIN — HYDROMORPHONE HYDROCHLORIDE 4 MG: 4 TABLET ORAL at 07:57

## 2021-06-03 RX ADMIN — GABAPENTIN 400 MG: 400 CAPSULE ORAL at 17:26

## 2021-06-03 RX ADMIN — POLYETHYLENE GLYCOL 3350 17 G: 17 POWDER, FOR SOLUTION ORAL at 08:23

## 2021-06-03 RX ADMIN — CARVEDILOL 6.25 MG: 6.25 TABLET, FILM COATED ORAL at 17:26

## 2021-06-03 RX ADMIN — HYDROMORPHONE HYDROCHLORIDE 4 MG: 4 TABLET ORAL at 03:08

## 2021-06-03 RX ADMIN — TICAGRELOR 90 MG: 90 TABLET ORAL at 08:24

## 2021-06-03 RX ADMIN — DIAZEPAM 5 MG: 5 TABLET ORAL at 05:14

## 2021-06-03 RX ADMIN — CEFAZOLIN SODIUM 2000 MG: 2 SOLUTION INTRAVENOUS at 13:36

## 2021-06-03 RX ADMIN — TICAGRELOR 90 MG: 90 TABLET ORAL at 22:35

## 2021-06-03 RX ADMIN — ENOXAPARIN SODIUM 60 MG: 60 INJECTION SUBCUTANEOUS at 08:24

## 2021-06-03 RX ADMIN — HYDROMORPHONE HYDROCHLORIDE 1 MG: 1 INJECTION, SOLUTION INTRAMUSCULAR; INTRAVENOUS; SUBCUTANEOUS at 09:23

## 2021-06-03 RX ADMIN — HYDROMORPHONE HYDROCHLORIDE 0.5 MG: 1 INJECTION, SOLUTION INTRAMUSCULAR; INTRAVENOUS; SUBCUTANEOUS at 17:26

## 2021-06-03 RX ADMIN — HYDROMORPHONE HYDROCHLORIDE 1 MG: 1 INJECTION, SOLUTION INTRAMUSCULAR; INTRAVENOUS; SUBCUTANEOUS at 00:16

## 2021-06-03 RX ADMIN — DIAZEPAM 5 MG: 5 TABLET ORAL at 12:26

## 2021-06-03 RX ADMIN — HYDROMORPHONE HYDROCHLORIDE 6 MG: 4 TABLET ORAL at 20:31

## 2021-06-03 RX ADMIN — ACETAMINOPHEN 975 MG: 325 TABLET, FILM COATED ORAL at 13:36

## 2021-06-03 NOTE — PROGRESS NOTES
Progress Note - Quan Lal 37 y o  male MRN: [id number]    Unit/Bed#: [facility] 616-53 Encounter: [id number]      Assessment:  38 y/o M p/w LLE cellulitis, L thigh fluid collection, s/p bedside I&D on 5/26, OR I&D on 5/26, L thigh washout/closure on 5/28    AVSS  Dressing taken down today  Erythema and swelling LLE much improved  Wbc: 15-> 13    Plan:  Continue diet  Continue abx  Ambulate  Continue to elevate and ace wrap lle  Local wound care L thigh and groin  Maintain penrose  Subjective:   Feels well  Tried to ambulate yesterday but moderate discomfort thigh and LLE  Wants to try again today  Objective:     Vitals: Blood pressure 120/62, pulse 69, temperature 98 4 °F (36 9 °C), temperature source Oral, resp  rate 18, height 5' 7" (1 702 m), weight (!) 164 kg (361 lb 4 8 oz), SpO2 94 %  ,Body mass index is 56 59 kg/m²  Intake/Output Summary (Last 24 hours) at 6/3/2021 0622  Last data filed at 6/3/2021 0511  Gross per 24 hour   Intake 2020 ml   Output 3600 ml   Net -1580 ml       Physical Exam  General: NAD  HEENT: NC/AT  MMM  Cv: RRR  Lungs: normal effort  Ab: Soft, NT/ND  Ex: LLE cellulitis and swelling improved     Neuro: AAOx3    Scheduled Meds:  Current Facility-Administered Medications   Medication Dose Route Frequency Provider Last Rate    acetaminophen  975 mg Oral [facility] Birtha Skiff, PA-C      aspirin  81 mg Oral Daily Birtha Skiff, PA-C      bisacodyl  10 mg Rectal Daily PRN Birtha Skiff, PA-C      carvedilol  6 25 mg Oral BID With Meals Birtha Skiff, PA-C      cefazolin  2,000 mg Intravenous Q8H Harinder Milligan MD 2,000 mg (06/03/21 0514)    diazepam  5 mg Oral Q6H [facility] Alphonse Mcclure PA-C      enoxaparin  60 mg Subcutaneous Q12H [facility] Birtha Skiff, PA-C      gabapentin  400 mg Oral TID Alphonse Mcclure PA-C      HYDROmorphone  1 mg Intravenous Q2H PRN Birtha Skiff, PA-C      HYDROmorphone  2 mg Oral Q4H PRN Birtha Skiff, PA-C      Or   Hugo Menchaca HYDROmorphone  4 mg Oral Q4H PRN Radha Ca PA-C      insulin lispro  2-12 Units Subcutaneous TID AC Radha Ca PA-C      melatonin  6 mg Oral HS Radha Ca PA-C      nystatin  1 application Topical BID [place], [place]      ondansetron  4 mg Intravenous Q6H PRN Radha Ca PA-C      polyethylene glycol  17 g Oral Daily Radha Ca [place]      senna-docusate sodium  2 tablet Oral BID Radha Ca PA-C      sodium chloride  2 spray Each Nare Q1H PRN Chano Sandhu MD      ticagrelor  90 mg Oral Q12H [address] Radha Ca PA-C       Continuous Infusions:   PRN Meds: bisacodyl    HYDROmorphone    HYDROmorphone **OR** HYDROmorphone    ondansetron    sodium chloride      Invasive Devices     Peripheral Intravenous Line            Peripheral IV 05/31/21 Right;Ventral (anterior) Forearm 2 days          Drain            Open Drain Left Thigh 5 days    Open Drain Left Thigh 5 days                Lab, Imaging and other studies: I have personally reviewed pertinent reports      VTE Pharmacologic Prophylaxis: Sequential compression device (Venodyne)   VTE Mechanical Prophylaxis: sequential compression device

## 2021-06-03 NOTE — PROGRESS NOTES
Progress Note - Acute Pain Service    David Gonzalez 37 y o  male MRN: [id number]  Unit/Bed#: [facility] 396-62 Encounter: [id number]      Assessment:   Principal Problem:    Sepsis ([facility] [address] )  Active Problems:    Shortness of breath    Difficult intubation    Di[facility] ([facility] )    David Gonzalez is a 37 y o  male who presents with fevers and worsening left lower extremity/groin pain   History of surgical excision of left groin hidradenitis   I&D of collection of left lower extremity done in the ED  Meredith Griffith went to the OR for a left thigh wound washout on 05/26/2021 and again on 05/28/2021 for washout and closure      Plan:   · Tylenol 975 mg every 8 hours scheduled  · Valium 5 mg oral every 6 hours scheduled   · Gabapentin 400 mg 3 times a day, increased on 06/01/2021   · Increase Dilaudid to 4 mg oral every 4 hours as needed for moderate pain   · Increase Dilaudid to 6 mg oral every 4 hours as needed for severe pain  · Decrease Dilaudid to 0 5 mg IV every 2 hours as needed for breakthrough pain  · Will continue to reduced IV opioids daily as tolerated    Bowel management   · MiraLax daily  · Senna S  2 times a day   · Bisacodyl daily as needed    Left lower extremity pain is persistent  We will continue to re-evaluate pain with physical therapy  Case discussed with primary care service  Will start to wean off IV analgesics today  APS will continue to follow  Please call  / [phone] or [facility] Acute Pain Service - B (/ between [phone] and on weekends) with questions or concerns    Pain History  Current pain location(s): left leg   Pain Scale:   8-10  Quality: sharp, burning   24 hour history:  Patient resting in bed, no acute distress  Continues to report severe left lower extremity pain which is described as sharp and burning  Patient states pain is most severe with any movement   P r n  oral and IV Dilaudid provided mild improvement in pain    Tolerated the addition of Valium yesterday, patient states he feels mildly drowsy at times but is able to get some rest   Tolerating current analgesic regimen      Opioid requirement previous 24 hours:   Oral Dilaudid 16 mg, IV Dilaudid 6 mg    Meds/Allergies   all current active meds have been reviewed and current meds:   Current Facility-Administered Medications   Medication Dose Route Frequency    acetaminophen (TYLENOL) tablet 975 mg  975 mg Oral Q8H [address]    aspirin (ECOTRIN LOW STRENGTH) EC tablet 81 mg  81 mg Oral Daily    bisacodyl (DULCOLAX) rectal suppository 10 mg  10 mg Rectal Daily PRN    carvedilol (COREG) tablet 6 25 mg  6 25 mg Oral BID With Meals    ceFAZolin (ANCEF) IVPB (premix in dextrose) 2,000 mg 50 mL  2,000 mg Intravenous Q8H    diazepam (VALIUM) tablet 5 mg  5 mg Oral Q6H [address]    enoxaparin (LOVENOX) subcutaneous injection 60 mg  60 mg Subcutaneous Q12H [address]    gabapentin (NEURONTIN) capsule 400 mg  400 mg Oral TID    HYDROmorphone (DILAUDID) injection 1 mg  1 mg Intravenous Q2H PRN    HYDROmorphone (DILAUDID) tablet 2 mg  2 mg Oral Q4H PRN    Or    HYDROmorphone (DILAUDID) tablet 4 mg  4 mg Oral Q4H PRN    insulin lispro (HumaLOG) 100 units/mL subcutaneous injection 2-12 Units  2-12 Units Subcutaneous TID AC    melatonin tablet 6 mg  6 mg Oral HS    nystatin (MYCOSTATIN) powder 1 application  1 application Topical BID    ondansetron (ZOFRAN) injection 4 mg  4 mg Intravenous Q6H PRN    polyethylene glycol (MIRALAX) packet 17 g  17 g Oral Daily    senna-docusate sodium (SENOKOT S) 8 6-50 mg per tablet 2 tablet  2 tablet Oral BID    sodium chloride (OCEAN) 0 65 % nasal spray 2 spray  2 spray Each Nare Q1H PRN    ticagrelor (BRILINTA) tablet 90 mg  90 mg Oral Q12H SHAZIA       Allergies   Allergen Reactions    Amoxicillin Hives       Objective     Temp:  [98 °F (36 7 °C)-99 8 °F (37 7 °C)] 98 6 °F (37 °C)  HR:  [64-78] 64  Resp:  [16-18] 16  BP: (112-138)/(61-77) 112/61    Physical Exam  Constitutional:       General: He is not in acute distress  Appearance: He is not ill-appearing, toxic-appearing or diaphoretic  HENT:      Head: Normocephalic  Nose: Nose normal    Pulmonary:      Effort: Pulmonary effort is normal  No tachypnea, bradypnea or respiratory distress  Comments: Wearing oxygen via NC  Musculoskeletal:      Left lower leg: Edema present  Comments: LLE ace wrap dressing intact   Skin:     General: Skin is warm  Neurological:      Mental Status: He is alert and oriented to person, place, and time  Mental status is at baseline  Psychiatric:         Attention and Perception: Attention normal          Mood and Affect: Mood normal  Mood is not anxious  Speech: Speech normal          Behavior: Behavior normal  Behavior is cooperative  Lab Results:   Results from last 7 days   Lab Units 06/03/21  0510   WBC Thousand/uL 13 97*   HEMOGLOBIN g/dL 8 8*   HEMATOCRIT % 30 0*   PLATELETS Thousands/uL 677*      Results from last 7 days   Lab Units 06/03/21  0510   POTASSIUM mmol/L 4 5   CHLORIDE mmol/L 101   CO2 mmol/L 32   BUN mg/dL 16   CREATININE mg/dL 0 72   CALCIUM mg/dL 9 6         Please note that the APS provides consultative services regarding pain management only  With the exception of ketamine and epidural infusions and except when indicated, final decisions regarding starting or changing doses of analgesic medications are at the discretion of the consulting service  Off hours consultation and/or medication management is generally not available      PETRA Mistry  Acute Pain Service

## 2021-06-03 NOTE — PROGRESS NOTES
Progress Note - Infectious Disease   Bozena Noriega 37 y o  male MRN: [id number]  Unit/Bed#: [facility] 616-01 Encounter: [id number]      Impression/Recommendations:  1   Sepsis, POA   Fever, WBC   Evolving to shock   Due to # 2/3  No other appreciable source   ROS and exam otherwise benign   Blood cultures negative   Clinically stable and nontoxic   Overall clinically improving      Rec:  ? Continue antibiotics as below  ? Follow temperatures closely  ? Recheck CBC in a m   ? Supportive care as per primary service     2   Left thigh superinfected seroma   In setting of # 4  Status post I and D 5/26  Cultures with group G strep   Clinically improved on repeat CT  Rec:  ? Continue antibiotics as below  ? Postoperative care per surgery     3   Left lower extremity cellulitis   Overall appearance consistent with known streptococcal infection   Suspect worsening symptoms, erythema due to uncontrolled edema, toxin mediated skin changes   Doubt MDRO or other pathogen  Improving with ACE wrap  Rec:  ? Continue cefazolin for now  ? Continue  ACE compression wrap and advised leg elevation  ? Serial exams  ? Encouraged OOB as tolerated  ? Wean opioids per APS  ? Could transition to PO Keflex at any time with plan to continue for 14 days total through 6/8 but will keep on IV while remains in-house     4   History of left groin hidradenitis   Status post excision April 2021 with chronic wound      5   Candida intertrigo  Multifactorial   Rec:  ? Continue LWC per nursing        6  MO   Risk factor for infection, poor wound healing     7   Poorly controlled DM   With hyperglycemia   Recent A1c 03/2021 7 7   Risk factor for infection      The above plan was discussed in detail with the primary service  Antibiotics:  Cefazolin  Antibiotics # 9    Subjective:  Patient seen on AM rounds  Has severe pain in left leg when he tries to get up  Limits his mobility  Admits redness and swelling of leg much better    Denies fevers, chills, sweats, nausea, vomiting, or diarrhea  24 Hour Events:  No documented fevers, chills, sweats, nausea, vomiting, or diarrhea  Objective:  Vitals:  Temp:  [98 °F (36 7 °C)-99 8 °F (37 7 °C)] 98 6 °F (37 °C)  HR:  [64-78] 64  Resp:  [16-18] 16  BP: (112-138)/(61-77) 112/61  SpO2:  [93 %-96 %] 96 %  Temp (24hrs), Av 9 °F (37 2 °C), Min:98 °F (36 7 °C), Max:99 8 °F (37 7 °C)  Current: Temperature: 98 6 °F (37 °C)    Physical Exam:   General:  No acute distress  Psychiatric:  Awake and alert  Pulmonary:  Normal respiratory excursion without accessory muscle use  Abdomen:  Soft, nontender  Extremities:  ACE wrap to LLE with improved edema  Wound photos reviewed:  Cellulitis nearly resolved, stable purpuric bullae medial/posterior calf  Skin:  No rashes    Lab Results:  I have personally reviewed pertinent labs  Results from last 7 days   Lab Units 21  0510 21  0505 21  0514   POTASSIUM mmol/L 4 5 4 5 4 3   CHLORIDE mmol/L 101 101 101   CO2 mmol/L 32 34* 32   BUN mg/dL 16 13 15   CREATININE mg/dL 0 72 0 68 0 77   EGFR ml/min/1 73sq m 117 119 114   CALCIUM mg/dL 9 6 9 4 9 0     Results from last 7 days   Lab Units 21  0510 21  0505 21  0514   WBC Thousand/uL 13 97* 15 31* 14 26*   HEMOGLOBIN g/dL 8 8* 8 5* 9 0*   PLATELETS Thousands/uL 677* 617* 594*           Imaging Studies:   I have personally reviewed pertinent imaging study reports and images in PACS  EKG, Pathology, and Other Studies:   I have personally reviewed pertinent reports

## 2021-06-03 NOTE — ARC ADMISSION
Referral received for consideration of patient for inpatient acute rehab  Will continue to follow patient's case and update as able

## 2021-06-04 LAB
ANION GAP SERPL CALCULATED.3IONS-SCNC: 6 MMOL/L (ref 4–13)
BASOPHILS # BLD AUTO: 0.04 THOUSANDS/ΜL (ref 0–0.1)
BASOPHILS NFR BLD AUTO: 0 % (ref 0–1)
BUN SERPL-MCNC: 16 MG/DL (ref 5–25)
CALCIUM SERPL-MCNC: 9.7 MG/DL (ref 8.3–10.1)
CHLORIDE SERPL-SCNC: 100 MMOL/L (ref 100–108)
CO2 SERPL-SCNC: 31 MMOL/L (ref 21–32)
CREAT SERPL-MCNC: 0.86 MG/DL (ref 0.6–1.3)
EOSINOPHIL # BLD AUTO: 0.13 THOUSAND/ΜL (ref 0–0.61)
EOSINOPHIL NFR BLD AUTO: 1 % (ref 0–6)
ERYTHROCYTE [DISTWIDTH] IN BLOOD BY AUTOMATED COUNT: 16.4 % (ref 11.6–15.1)
GFR SERPL CREATININE-BSD FRML MDRD: 108 ML/MIN/1.73SQ M
GLUCOSE SERPL-MCNC: 139 MG/DL (ref 65–140)
GLUCOSE SERPL-MCNC: 158 MG/DL (ref 65–140)
GLUCOSE SERPL-MCNC: 205 MG/DL (ref 65–140)
GLUCOSE SERPL-MCNC: 209 MG/DL (ref 65–140)
HCT VFR BLD AUTO: 29.7 % (ref 36.5–49.3)
HGB BLD-MCNC: 8.6 G/DL (ref 12–17)
IMM GRANULOCYTES # BLD AUTO: 0.17 THOUSAND/UL (ref 0–0.2)
IMM GRANULOCYTES NFR BLD AUTO: 1 % (ref 0–2)
LYMPHOCYTES # BLD AUTO: 1.46 THOUSANDS/ΜL (ref 0.6–4.47)
LYMPHOCYTES NFR BLD AUTO: 12 % (ref 14–44)
MCH RBC QN AUTO: 24.4 PG (ref 26.8–34.3)
MCHC RBC AUTO-ENTMCNC: 29 G/DL (ref 31.4–37.4)
MCV RBC AUTO: 84 FL (ref 82–98)
MONOCYTES # BLD AUTO: 0.72 THOUSAND/ΜL (ref 0.17–1.22)
MONOCYTES NFR BLD AUTO: 6 % (ref 4–12)
NEUTROPHILS # BLD AUTO: 10.14 THOUSANDS/ΜL (ref 1.85–7.62)
NEUTS SEG NFR BLD AUTO: 80 % (ref 43–75)
NRBC BLD AUTO-RTO: 0 /100 WBCS
PLATELET # BLD AUTO: 673 THOUSANDS/UL (ref 149–390)
PMV BLD AUTO: 8.8 FL (ref 8.9–12.7)
POTASSIUM SERPL-SCNC: 4.3 MMOL/L (ref 3.5–5.3)
RBC # BLD AUTO: 3.52 MILLION/UL (ref 3.88–5.62)
SODIUM SERPL-SCNC: 137 MMOL/L (ref 136–145)
WBC # BLD AUTO: 12.66 THOUSAND/UL (ref 4.31–10.16)

## 2021-06-04 PROCEDURE — 99024 POSTOP FOLLOW-UP VISIT: CPT | Performed by: SURGERY

## 2021-06-04 PROCEDURE — 99232 SBSQ HOSP IP/OBS MODERATE 35: CPT | Performed by: NURSE PRACTITIONER

## 2021-06-04 PROCEDURE — 82948 REAGENT STRIP/BLOOD GLUCOSE: CPT

## 2021-06-04 PROCEDURE — 85025 COMPLETE CBC W/AUTO DIFF WBC: CPT | Performed by: SURGERY

## 2021-06-04 PROCEDURE — 97530 THERAPEUTIC ACTIVITIES: CPT

## 2021-06-04 PROCEDURE — 97110 THERAPEUTIC EXERCISES: CPT

## 2021-06-04 PROCEDURE — 97112 NEUROMUSCULAR REEDUCATION: CPT

## 2021-06-04 PROCEDURE — 80048 BASIC METABOLIC PNL TOTAL CA: CPT | Performed by: SURGERY

## 2021-06-04 PROCEDURE — 99232 SBSQ HOSP IP/OBS MODERATE 35: CPT | Performed by: INTERNAL MEDICINE

## 2021-06-04 PROCEDURE — 97535 SELF CARE MNGMENT TRAINING: CPT

## 2021-06-04 RX ORDER — GABAPENTIN 300 MG/1
600 CAPSULE ORAL 3 TIMES DAILY
Status: DISCONTINUED | OUTPATIENT
Start: 2021-06-04 | End: 2021-06-06 | Stop reason: HOSPADM

## 2021-06-04 RX ORDER — OXYCODONE HYDROCHLORIDE 10 MG/1
10 TABLET ORAL EVERY 4 HOURS PRN
Status: DISCONTINUED | OUTPATIENT
Start: 2021-06-04 | End: 2021-06-06 | Stop reason: HOSPADM

## 2021-06-04 RX ADMIN — CARVEDILOL 6.25 MG: 6.25 TABLET, FILM COATED ORAL at 08:32

## 2021-06-04 RX ADMIN — ACETAMINOPHEN 975 MG: 325 TABLET, FILM COATED ORAL at 05:05

## 2021-06-04 RX ADMIN — DIAZEPAM 5 MG: 5 TABLET ORAL at 00:12

## 2021-06-04 RX ADMIN — NYSTATIN 1 APPLICATION: 100000 POWDER TOPICAL at 08:33

## 2021-06-04 RX ADMIN — ENOXAPARIN SODIUM 60 MG: 60 INJECTION SUBCUTANEOUS at 20:53

## 2021-06-04 RX ADMIN — INSULIN LISPRO 4 UNITS: 100 INJECTION, SOLUTION INTRAVENOUS; SUBCUTANEOUS at 12:37

## 2021-06-04 RX ADMIN — CEFAZOLIN SODIUM 2000 MG: 2 SOLUTION INTRAVENOUS at 14:07

## 2021-06-04 RX ADMIN — HYDROMORPHONE HYDROCHLORIDE 0.5 MG: 1 INJECTION, SOLUTION INTRAMUSCULAR; INTRAVENOUS; SUBCUTANEOUS at 16:49

## 2021-06-04 RX ADMIN — MELATONIN TAB 3 MG 6 MG: 3 TAB at 20:54

## 2021-06-04 RX ADMIN — DOCUSATE SODIUM AND SENNOSIDES 2 TABLET: 8.6; 5 TABLET ORAL at 08:32

## 2021-06-04 RX ADMIN — CARVEDILOL 6.25 MG: 6.25 TABLET, FILM COATED ORAL at 17:01

## 2021-06-04 RX ADMIN — CEFAZOLIN SODIUM 2000 MG: 2 SOLUTION INTRAVENOUS at 05:06

## 2021-06-04 RX ADMIN — DIAZEPAM 5 MG: 5 TABLET ORAL at 05:06

## 2021-06-04 RX ADMIN — DOCUSATE SODIUM AND SENNOSIDES 2 TABLET: 8.6; 5 TABLET ORAL at 17:01

## 2021-06-04 RX ADMIN — NYSTATIN 1 APPLICATION: 100000 POWDER TOPICAL at 17:02

## 2021-06-04 RX ADMIN — INSULIN LISPRO 2 UNITS: 100 INJECTION, SOLUTION INTRAVENOUS; SUBCUTANEOUS at 08:34

## 2021-06-04 RX ADMIN — HYDROMORPHONE HYDROCHLORIDE 0.5 MG: 1 INJECTION, SOLUTION INTRAMUSCULAR; INTRAVENOUS; SUBCUTANEOUS at 11:36

## 2021-06-04 RX ADMIN — CEFAZOLIN SODIUM 2000 MG: 2 SOLUTION INTRAVENOUS at 21:00

## 2021-06-04 RX ADMIN — TICAGRELOR 90 MG: 90 TABLET ORAL at 08:32

## 2021-06-04 RX ADMIN — ENOXAPARIN SODIUM 60 MG: 60 INJECTION SUBCUTANEOUS at 08:32

## 2021-06-04 RX ADMIN — HYDROMORPHONE HYDROCHLORIDE 6 MG: 4 TABLET ORAL at 05:06

## 2021-06-04 RX ADMIN — GABAPENTIN 400 MG: 400 CAPSULE ORAL at 08:32

## 2021-06-04 RX ADMIN — OXYCODONE HYDROCHLORIDE 15 MG: 10 TABLET ORAL at 14:09

## 2021-06-04 RX ADMIN — POLYETHYLENE GLYCOL 3350 17 G: 17 POWDER, FOR SOLUTION ORAL at 08:32

## 2021-06-04 RX ADMIN — GABAPENTIN 600 MG: 300 CAPSULE ORAL at 17:01

## 2021-06-04 RX ADMIN — GABAPENTIN 600 MG: 300 CAPSULE ORAL at 20:53

## 2021-06-04 RX ADMIN — HYDROMORPHONE HYDROCHLORIDE 0.5 MG: 1 INJECTION, SOLUTION INTRAMUSCULAR; INTRAVENOUS; SUBCUTANEOUS at 21:06

## 2021-06-04 RX ADMIN — ASPIRIN 81 MG: 81 TABLET, COATED ORAL at 08:32

## 2021-06-04 RX ADMIN — OXYCODONE HYDROCHLORIDE 15 MG: 10 TABLET ORAL at 19:38

## 2021-06-04 RX ADMIN — ACETAMINOPHEN 975 MG: 325 TABLET, FILM COATED ORAL at 14:07

## 2021-06-04 RX ADMIN — ACETAMINOPHEN 975 MG: 325 TABLET, FILM COATED ORAL at 20:53

## 2021-06-04 RX ADMIN — HYDROMORPHONE HYDROCHLORIDE 0.5 MG: 1 INJECTION, SOLUTION INTRAMUSCULAR; INTRAVENOUS; SUBCUTANEOUS at 08:32

## 2021-06-04 RX ADMIN — TICAGRELOR 90 MG: 90 TABLET ORAL at 20:53

## 2021-06-04 RX ADMIN — HYDROMORPHONE HYDROCHLORIDE 6 MG: 4 TABLET ORAL at 09:45

## 2021-06-04 NOTE — CASE MANAGEMENT
Bariatric RW delivered to patient room  Patient aware there may be an OOP copay  [facility] patient more appropriate to DC home with [facility], is not an ARC candidate

## 2021-06-04 NOTE — CASE MANAGEMENT
Patient requested referral be placed for [address] [place] [address] T[facility]  Referral entered in [place]    1st choice is [facility]

## 2021-06-04 NOTE — OCCUPATIONAL THERAPY NOTE
Occupational Therapy Treatment Note:       06/04/21 1005   OT Last Visit   OT Visit Date 06/04/21   Note Type   Note Type Treatment   Activity Tolerance   Activity Tolerance Patient tolerated treatment well   Assessment   Assessment pt participated in am ot session and was seen focusing on sitting eob for over 25 min during adls  pt requires overall sba ub and grooming, mod asst lb  pt has not had a bm in days and reprots good appetite  pt  with improved tolerence to sit eob from previous pt session after speaking with pta  pt also stood for 1 min for anal hygiene using rw very minimally for support  pt took 1 side step towards hob and returned self to bed with sba  pt is motivated, wishes to get home asa voiced frustration over lengthy hospital stays now and in april    will follow focusing on goals from ie  currently pt'as adls mobility and toileting status  is limited by pain  pt with several dressings on buttocks and l thigh / and ace wrap on l lower leg and foot per nsg pt remains wbat l le   Plan   Treatment Interventions ADL retraining;Functional transfer training; Endurance training;Patient/family training;Equipment evaluation/education; Activityengagement   Goal Expiration Date 06/13/21   OT Treatment Day 1   OT Frequency 3-5x/wk   Recommendation   OT Discharge Recommendation Post acute rehabilitation services   April A Carl Bledsoe

## 2021-06-04 NOTE — PLAN OF CARE
Problem: OCCUPATIONAL THERAPY ADULT  Goal: Performs self-care activities at highest level of function for planned discharge setting  See evaluation for individualized goals  Description: Treatment Interventions: ADL retraining, Functional transfer training, Endurance training, Patient/family training, Equipment evaluation/education, Compensatory technique education, Energy conservation, Activityengagement          See flowsheet documentation for full assessment, interventions and recommendations  Outcome: Progressing  Note: Limitation: Decreased ADL status, Decreased endurance, Decreased self-care trans, Decreased high-level ADLs  Prognosis: Good  Assessment: pt participated in am ot session and was seen focusing on sitting eob for over 25 min during adls  pt requires overall sba ub and grooming, mod asst lb  pt has not had a bm in days and reprots good appetite  pt  with improved tolerence to sit eob from previous pt session after speaking with pta  pt also stood for 1 min for anal hygiene using rw very minimally for support  pt took 1 side step towards hob and returned self to bed with sba  pt is motivated, wishes to get home asa voiced frustration over lengthy hospital stays now and in april    will follow focusing on goals from ie  currently pt'as adls mobility and toileting status  is limited by pain   pt with several dressings on buttocks and l thigh / and ace wrap on l lower leg and foot per nsg pt remains wbat l le     OT Discharge Recommendation: Post acute rehabilitation services     Sharon Rodriguez

## 2021-06-04 NOTE — PHYSICAL THERAPY NOTE
Physical Therapy Progress Note     06/04/21 1215   PT Last Visit   PT Visit Date 06/04/21   Note Type   Note Type Treatment   Pain Assessment   Pain Assessment Tool 0-10   Pain Score Worst Possible Pain   Hospital Pain Intervention(s) Repositioned; Ambulation/increased activity; Emotional support   Restrictions/Precautions   Other Precautions Pain   Subjective   Subjective The pt  states that he has severe pain still, but that he was able to stand with occupational therapy  He expressed difficulty getting sleep due to the pain and all of the interruptions  He was agreeable to get out to the chair for lunch  The pt  expressed understanding of the importance of sitting out in the chair three times a day as well as frequently lowering his leg to adjust to the increased pain every 5 minutes throughout the day  Pt  declined ambulating to the door or the hallway due to his pain  Bed Mobility   Supine to Sit 5  Supervision   Additional items Bedrails;HOB elevated   Transfers   Sit to Stand 5  Supervision   Additional items Increased time required   Stand to Sit 5  Supervision   Ambulation/Elevation   Gait pattern Excessively slow; Short stride; Forward Flexion   Gait Assistance 5  Supervision   Assistive Device Bariatric Rolling walker   Distance 5 feet  Balance   Static Sitting Good   Dynamic Sitting Fair +   Static Standing Fair +   Ambulatory Fair   Activity Tolerance   Activity Tolerance Patient limited by pain   Nurse [address], RN  Exercises   Hip Abduction Supine;Left;AROM;5 reps   Knee AROM Long Arc Quad Sitting;Left;AROM;5 reps   Ankle Pumps Supine;Bilateral;AROM;5 reps   Assessment   Prognosis Good   Problem List Decreased mobility; Decreased endurance; Impaired balance;Pain;Decreased range of motion;Decreased strength;Decreased skin integrity   Assessment The patient's treatment was coordinated with his pain medication today   He had both oral dilauded, IV dilauded, and acetaminophen in his system during the session  Of note, the IV dilauded was administered during the session and mobility was held for 15 minutes for full effect  When he was mobilizing he required no assistance and was limited solely due to pain  He was able to take a few steps over to the chair, but he declined any further mobility due to his pain  Extensive education was provided to the patient about lowering his left leg from the side of the bed every 5 minutes while supine or while in the chair  Also recommended that he sit at the side of the bed several times  He was instructed in the importance of sitting out in the chair for all meals as well  The pt  was further educated in the plan of frequently lowering his leg dependently in order to reduce his sensitization to this which is preventing his mobility  Educated him to the fact that if he does perform these activities frequently, that he will likely quickly recover and be able to attend the graduation for his family member next Friday  The pt  was attempted earlier, but the pt  noted that he worked with occupational therapy, and that he does not get any sleep  Therapy waited 50 minutes after administration of his oral pain meds prior to the session, and allowed him to sleep upon returning as well  Therapy was initiated when nursing leadership woke him up for rounding  The pt  appears limited primarily due to pain as he required no physical assistance throughout mobility  Anticipate that with improved pain control, and equally importantly the patient mobilizing to the chair, bathroom, side of the bed, and lowering his leg for desensitization, that he will be able to return home  He currently is unsafe and unable to return home due to his limited mobility to date  Emphasized to the patient the importance of his role for mobility and his recovery  Barriers to Discharge Inaccessible home environment   Goals   Patient Goals To have less pain, and to attend a graduation next Friday     STG Expiration Date 06/09/21   PT Treatment Day 2   Plan   Treatment/Interventions Functional transfer training;LE strengthening/ROM; Elevations; Therapeutic exercise; Endurance training;Patient/family training;Bed mobility;Gait training   Progress Slow progress, decreased activity tolerance   PT Frequency   (3-6x a week )   Recommendation   PT Discharge Recommendation Post acute rehabilitation services  (Pursuant to the conditions in the assessment )   Equipment Recommended [address] [facility] Recommended HD Bariatric wheeled walker   [place] [address] in Bed Without Bedrails 4   Lying on Back to Sitting on Edge of Flat Bed 4   Moving Bed to Chair 4   Standing Up From Chair 4   Walk in Room 4   Climb 3-5 Stairs 2   Basic Mobility Inpatient Raw Score 22   Basic Mobility Standardized Score 47 4   An AM-PAC Basic Mobility standardized score less than 42 9 suggests the patient may benefit from discharge to post-acute rehab services      Tiffany Singh, PTA

## 2021-06-04 NOTE — PLAN OF CARE
Problem: PHYSICAL THERAPY ADULT  Goal: Performs mobility at highest level of function for planned discharge setting  See evaluation for individualized goals  Description: Treatment/Interventions: Functional transfer training, LE strengthening/ROM, Elevations, Therapeutic exercise, Endurance training, Patient/family training, Equipment eval/education, Bed mobility, Gait training, Spoke to nursing, OT  Equipment Recommended: Deric Chawla       See flowsheet documentation for full assessment, interventions and recommendations  Outcome: Progressing  Note: Prognosis: Good  Problem List: Decreased mobility, Decreased endurance, Impaired balance, Pain, Decreased range of motion, Decreased strength, Decreased skin integrity  Assessment: (S) The patient's treatment was coordinated with his pain medication today  He had both oral dilauded, IV dilauded, and acetaminophen in his system during the session  Of note, the IV dilauded was administered during the session and mobility was held for 15 minutes for full effect  When he was mobilizing he required no assistance and was limited solely due to pain  He was able to take a few steps over to the chair, but he declined any further mobility due to his pain  Extensive education was provided to the patient about lowering his left leg from the side of the bed every 5 minutes while supine or while in the chair  Also recommended that he sit at the side of the bed several times  He was instructed in the importance of sitting out in the chair for all meals as well  The pt  was further educated in the plan of frequently lowering his leg dependently in order to reduce his sensitization to this which is preventing his mobility  Educated him to the fact that if he does perform these activities frequently, that he will likely quickly recover and be able to attend the graduation for his family member next Friday   The pt  was attempted earlier, but the pt  noted that he worked with occupational therapy, and that he does not get any sleep  Therapy waited 50 minutes after administration of his oral pain meds prior to the session, and allowed him to sleep upon returning as well  Therapy was initiated when nursing leadership woke him up for rounding  The pt  appears limited primarily due to pain as he required no physical assistance throughout mobility  Anticipate that with improved pain control, and equally importantly the patient mobilizing to the chair, bathroom, side of the bed, and lowering his leg for desensitization, that he will be able to return home  He currently is unsafe and unable to return home due to his limited mobility to date  Emphasized to the patient the importance of his role for mobility and his recovery  Barriers to Discharge: Inaccessible home environment        PT Discharge Recommendation: Post acute rehabilitation services(Pursuant to the conditions in the assessment )     PT - OK to Discharge: Yes(To rehab when medically cleared)    See flowsheet documentation for full assessment

## 2021-06-04 NOTE — PROGRESS NOTES
Progress Note - Acute Pain Service    David Gonzalez 37 y o  male MRN: [id number]  Unit/Bed#: [facility] 175-64 Encounter: [id number]      Assessment:   Principal Problem:    Sepsis ([facility] [address] )  Active Problems:    Shortness of breath    Difficult intubation    Diabetic neuropathy ([facility] [address] )    David Gonzalez is a 37 y o  male who presents with fevers and worsening left lower extremity/groin pain   History of surgical excision of left groin hidradenitis   I&D of collection of left lower extremity done in the ED   Patient went to the OR for a left thigh wound washout on 05/26/2021 and again on 05/28/2021 for washout and closure  Plan:   · Tylenol 975 mg every 8 hours scheduled   · Discontinue Valium  · Increase Gabapentin to 600 mg 3 times a day  · Discontinue oral Dilaudid   · Oxycodone 10mg every 4 hours as needed for moderate pain  · Oxycodone 15mg every 4 hours as needed for severe pain   · Dilaudid 0 5 mg IV every 2 hours as needed for breakthrough  · Discontinue IV opioids tomorrow    Bowel management   · MiraLax daily   · Senokot S  2 tablets twice a day  · Bisacodyl daily as needed     Treatment recommendations discussed with primary care service  APS will continue to follow  Please call  / [phone] or [email or web address] Acute Pain Service - [facility] (/ between [phone] and on weekends) with questions or concerns    Pain History  Current pain location(s):   Left leg  Pain Scale: 8-10  Quality: sharp,  Burning, throbbing  24 hour history: Patient resting in bed, NAD  Continues to report severe throbbing and burning pain in his left LE which is worse when is leg is dependent  No significant change in pain with increased dose of oral dilaudid; continues to require IV dilaudid for breakthrough pain  Agreeable to opioid rotate to oxycodone      Opioid requirement previous 24 hours:  IV Dilaudid 2 mg, oral Dilaudid 22 mg    Meds/Allergies   all current active meds have been reviewed and current meds:   Current Facility-Administered Medications   Medication Dose Route Frequency    acetaminophen (TYLENOL) tablet 975 mg  975 mg Oral Q8H [facility]    aspirin (ECOTRIN LOW STRENGTH) EC tablet 81 mg  81 mg Oral Daily    bisacodyl (DULCOLAX) rectal suppository 10 mg  10 mg Rectal Daily PRN    carvedilol (COREG) tablet 6 25 mg  6 25 mg Oral BID With Meals    ceFAZolin (ANCEF) IVPB (premix in dextrose) 2,000 mg 50 mL  2,000 mg Intravenous Q8H    diazepam (VALIUM) tablet 5 mg  5 mg Oral Q6H [facility]    enoxaparin (LOVENOX) subcutaneous injection 60 mg  60 mg Subcutaneous Q12H [facility]    gabapentin (NEURONTIN) capsule 400 mg  400 mg Oral TID    HYDROmorphone (DILAUDID) injection 0 5 mg  0 5 mg Intravenous Q2H PRN    HYDROmorphone (DILAUDID) tablet 4 mg  4 mg Oral Q4H PRN    Or    HYDROmorphone (DILAUDID) tablet 6 mg  6 mg Oral Q4H PRN    insulin lispro (HumaLOG) 100 units/mL subcutaneous injection 2-12 Units  2-12 Units Subcutaneous TID AC    melatonin tablet 6 mg  6 mg Oral HS    nystatin (MYCOSTATIN) powder 1 application  1 application Topical BID    ondansetron (ZOFRAN) injection 4 mg  4 mg Intravenous Q6H PRN    polyethylene glycol (MIRALAX) packet 17 g  17 g Oral Daily    senna-docusate sodium (SENOKOT S) 8 6-50 mg per tablet 2 tablet  2 tablet Oral BID    sodium chloride (OCEAN) 0 65 % nasal spray 2 spray  2 spray Each Nare Q1H PRN    ticagrelor (BRILINTA) tablet 90 mg  90 mg Oral Q12H [facility]       Allergies   Allergen Reactions    Amoxicillin Hives       Objective     Temp:  [98 6 °F (37 °C)-98 9 °F (37 2 °C)] 98 6 °F (37 °C)  HR:  [70-79] 78  Resp:  [17-18] 18  BP: (125-148)/(64-98) 125/67    Physical Exam  Vitals signs reviewed  Constitutional:       General: He is awake  He is not in acute distress  Appearance: He is not ill-appearing, toxic-appearing or diaphoretic  HENT:      Head: Normocephalic and atraumatic  Nose: Nose normal    Eyes:      Extraocular Movements: Extraocular movements intact     Neck: Musculoskeletal: Normal range of motion  Pulmonary:      Effort: Pulmonary effort is normal  No tachypnea, bradypnea or respiratory distress  Musculoskeletal:      Left lower leg: Edema present  Neurological:      Mental Status: He is alert and oriented to person, place, and time  Mental status is at baseline  Psychiatric:         Mood and Affect: Mood normal          Behavior: Behavior normal  Behavior is cooperative  Lab Results:   Results from last 7 days   Lab Units 06/04/21  1020   WBC Thousand/uL 12 66*   HEMOGLOBIN g/dL 8 6*   HEMATOCRIT % 29 7*   PLATELETS Thousands/uL 673*      Results from last 7 days   Lab Units 06/04/21  1020   POTASSIUM mmol/L 4 3   CHLORIDE mmol/L 100   CO2 mmol/L 31   BUN mg/dL 16   CREATININE mg/dL 0 86   CALCIUM mg/dL 9 7         Please note that the APS provides consultative services regarding pain management only  With the exception of ketamine and epidural infusions and except when indicated, final decisions regarding starting or changing doses of analgesic medications are at the discretion of the consulting service  Off hours consultation and/or medication management is generally not available      PETRA Martinez  Acute Pain Service

## 2021-06-04 NOTE — PLAN OF CARE
Problem: Nutrition/Hydration-ADULT  Goal: Nutrient/Hydration intake appropriate for improving, restoring or maintaining nutritional needs  Description: Monitor and assess patient's nutrition/hydration status for malnutrition  Collaborate with interdisciplinary team and initiate plan and interventions as ordered  Monitor patient's weight and dietary intake as ordered or per policy  Utilize nutrition screening tool and intervene as necessary  Determine patient's food preferences and provide high-protein, high-caloric foods as appropriate       INTERVENTIONS:  - Monitor oral intake, urinary output, labs, and treatment plans  - Assess nutrition and hydration status and recommend course of action  - Evaluate amount of meals eaten  - Assist patient with eating if necessary   - Allow adequate time for meals  - Recommend/ encourage appropriate diets, oral nutritional supplements, and vitamin/mineral supplements  - Order, calculate, and assess calorie counts as needed  - Recommend, monitor, and adjust tube feedings and TPN/PPN based on assessed needs  - Assess need for intravenous fluids  - Provide specific nutrition/hydration education as appropriate  - Include patient/family/caregiver in decisions related to nutrition  Outcome: Progressing     Problem: PAIN - ADULT  Goal: Verbalizes/displays adequate comfort level or baseline comfort level  Description: Interventions:  - Encourage patient to monitor pain and request assistance  - Assess pain using appropriate pain scale  - Administer analgesics based on type and severity of pain and evaluate response  - Implement non-pharmacological measures as appropriate and evaluate response  - Consider cultural and social influences on pain and pain management  - Notify physician/advanced practitioner if interventions unsuccessful or patient reports new pain  Outcome: Progressing     Problem: INFECTION - ADULT  Goal: Absence or prevention of progression during hospitalization  Description: INTERVENTIONS:  - Assess and monitor for signs and symptoms of infection  - Monitor lab/diagnostic results  - Monitor all insertion sites, i e  indwelling lines, tubes, and drains  - Monitor endotracheal if appropriate and nasal secretions for changes in amount and color  - [place] appropriate cooling/warming therapies per order  - Administer medications as ordered  - Instruct and encourage patient and family to use good hand hygiene technique  - Identify and instruct in appropriate isolation precautions for identified infection/condition  Outcome: Progressing     Problem: SAFETY ADULT  Goal: Patient will remain free of falls  Description: INTERVENTIONS:  - Assess patient frequently for physical needs  -  Identify cognitive and physical deficits and behaviors that affect risk of falls    -  [place] fall precautions as indicated by assessment   - Educate patient/family on patient safety including physical limitations  - Instruct patient to call for assistance with activity based on assessment  - Modify environment to reduce risk of injury  - Consider OT/PT consult to assist with strengthening/mobility  Outcome: Progressing  Goal: Maintain or return to baseline ADL function  Description: INTERVENTIONS:  -  Assess patient's ability to carry out ADLs; assess patient's baseline for ADL function and identify physical deficits which impact ability to perform ADLs (bathing, care of mouth/teeth, toileting, grooming, dressing, etc )  - Assess/evaluate cause of self-care deficits   - Assess range of motion  - Assess patient's mobility; develop plan if impaired  - Assess patient's need for assistive devices and provide as appropriate  - Encourage maximum independence but intervene and supervise when necessary  - Involve family in performance of ADLs  - Assess for home care needs following discharge   - Consider OT consult to assist with ADL evaluation and planning for discharge  - Provide patient education as appropriate  Outcome: Progressing  Goal: Maintain or return mobility status to optimal level  Description: INTERVENTIONS:  - Assess patient's baseline mobility status (ambulation, transfers, stairs, etc )    - Identify cognitive and physical deficits and behaviors that affect mobility  - Identify mobility aids required to assist with transfers and/or ambulation (gait belt, sit-to-stand, lift, walker, cane, etc )  - [place] fall precautions as indicated by assessment  - Record patient progress and toleration of activity level on Mobility SBAR; progress patient to next Phase/Stage  - Instruct patient to call for assistance with activity based on assessment  - Consider rehabilitation consult to assist with strengthening/weightbearing, etc   Outcome: Progressing     Problem: DISCHARGE PLANNING  Goal: Discharge to home or other facility with appropriate resources  Description: INTERVENTIONS:  - Identify barriers to discharge w/patient and caregiver  - Arrange for needed discharge resources and transportation as appropriate  - Identify discharge learning needs (meds, wound care, etc )  - Arrange for interpretive services to assist at discharge as needed  - Refer to Case Management Department for coordinating discharge planning if the patient needs post-hospital services based on physician/advanced practitioner order or complex needs related to functional status, cognitive ability, or social support system  Outcome: Progressing     Problem: Prexisting or High Potential for Compromised Skin Integrity  Goal: Skin integrity is maintained or improved  Description: INTERVENTIONS:  - Identify patients at risk for skin breakdown  - Assess and monitor skin integrity  - Assess and monitor nutrition and hydration status  - Monitor labs   - Assess for incontinence   - Turn and reposition patient  - Assist with mobility/ambulation  - Relieve pressure over bony prominences  - Avoid friction and shearing  - Provide appropriate hygiene as needed including keeping skin clean and dry  - Evaluate need for skin moisturizer/barrier cream  - Collaborate with interdisciplinary team   - Patient/family teaching  - Consider wound care consult   Outcome: Progressing     Problem: Potential for Falls  Goal: Patient will remain free of falls  Description: INTERVENTIONS:  - Assess patient frequently for physical needs  -  Identify cognitive and physical deficits and behaviors that affect risk of falls    -  [place] fall precautions as indicated by assessment   - Educate patient/family on patient safety including physical limitations  - Instruct patient to call for assistance with activity based on assessment  - Modify environment to reduce risk of injury  - Consider OT/PT consult to assist with strengthening/mobility  Outcome: Progressing

## 2021-06-04 NOTE — PROGRESS NOTES
Progress Note - General Surgery   Marlyn Thao 37 y o  male MRN: [id number]  Unit/Bed#: [facility] 616-01 Encounter: [id number]    Assessment:  36 y/o M p/w LLE cellulitis, L thigh fluid collection, s/p bedside I&D on 5/26, OR I&D on 5/26, L thigh washout/closure on 5/28    Plan:  --ADA diet  --Continue dressing changes to LLE  --Continue Abx, with eventual transition to PO Keflex through 6/8, per ID recs   --Pain control per APS recs  --PT/OT  --Dispo planning    Subjective/Objective     Subjective:     No acute events overnight  This morning, pt feels well, denies any complaints  Objective:     Blood pressure 148/98, pulse 72, temperature 98 8 °F (37 1 °C), temperature source Oral, resp  rate 17, height 5' 7" (1 702 m), weight (!) 164 kg (361 lb 4 8 oz), SpO2 94 %  ,Body mass index is 56 59 kg/m²  Intake/Output Summary (Last 24 hours) at 6/4/2021 0616  Last data filed at 6/4/2021 0536  Gross per 24 hour   Intake 630 ml   Output 2700 ml   Net -2070 ml       Invasive Devices     Peripheral Intravenous Line            Peripheral IV 05/31/21 Right;Ventral (anterior) Forearm 3 days          Drain            Open Drain Left Thigh 6 days    Open Drain Left Thigh 6 days                Physical Exam:     GEN: NAD  HEENT: MMM  CV: RRR  Lung: normal effort  Ab: Soft, NT/ND  Extrem: L thigh with sutures intact, penrose drains in place; LLE compression wrap in place  Neuro:  A+Ox3, motor and sensation grossly intact    Lab, Imaging and other studies:CBC: No results found for: WBC, HGB, HCT, MCV, PLT, ADJUSTEDWBC, MCH, MCHC, RDW, MPV, NRBC, CMP: No results found for: SODIUM, K, CL, CO2, ANIONGAP, BUN, CREATININE, GLUCOSE, CALCIUM, AST, ALT, ALKPHOS, PROT, BILITOT, EGFR, Coagulation: No results found for: PT, INR, APTT  VTE Pharmacologic Prophylaxis: Enoxaparin (Lovenox)  VTE Mechanical Prophylaxis: sequential compression device

## 2021-06-04 NOTE — PROGRESS NOTES
Progress Note - Infectious Disease   Royal Rudolph 37 y o  male MRN: [id number]  Unit/Bed#: [facility] 616-01 Encounter: [id number]      Impression/Recommendations:  1   Sepsis, POA   Fever, WBC   Evolving to shock   Due to # 2/3  No other appreciable source   ROS and exam otherwise benign   Blood cultures negative   Clinically stable and nontoxic   Overall clinically improving     Rec:  ? Continue antibiotics as below  ? Follow temperatures and WBC closely  ? Supportive care as per primary service     2   Left thigh superinfected seroma   In setting of # 4  Status post I and D 5/26  Cultures with group G strep   Clinically improved on repeat CT     Rec:  ? Continue antibiotics as below  ? Postoperative care per surgery     3   Left lower extremity cellulitis   Overall appearance consistent with known streptococcal infection   Suspect worsening symptoms, erythema due to uncontrolled edema, toxin mediated skin changes   Doubt MDRO or other pathogen   Now markedly improved with ACE wrap  Rec:  ? Continue cefazolin for now while in-house  ? Continue  ACE compression wrap and advised leg elevation  ? Serial exams  ? Encouraged OOB as tolerated  ? Wean opioids per APS  ? Could transition to PO Keflex at any time with plan to continue for 14 days total through 6/8     4   History of left groin hidradenitis   Status post excision April 2021 with chronic wound      5   [place] intertrigo   Multifactorial   LWC per nursing ongoing      6   MO   Risk factor for infection, poor wound healing     7   Poorly controlled DM   With hyperglycemia   Recent A1c 03/2021 7 7   Risk factor for infection      The patient is stable from an ID standpoint  If the patient is still here, I will reassess the patient on Monday 6/7  Please call in the interim with new questions      Antibiotics:  Cefazolin  Antibiotics #10    Subjective:  Patient seen on AM rounds    Continues to have pain and pressure in leg after standing and wants to have IV pain medicine after activity  Denies fevers, chills, sweats, nausea, vomiting, or diarrhea  24 Hour Events:  No documented fevers, chills, sweats, nausea, vomiting, or diarrhea  Objective:  Vitals:  Temp:  [98 6 °F (37 °C)-98 9 °F (37 2 °C)] 98 6 °F (37 °C)  HR:  [64-79] 70  Resp:  [16-18] 18  BP: (112-148)/(61-98) 130/64  SpO2:  [93 %-97 %] 95 %  Temp (24hrs), Av 7 °F (37 1 °C), Min:98 6 °F (37 °C), Max:98 9 °F (37 2 °C)  Current: Temperature: 98 6 °F (37 °C)    Physical Exam:   General:  No acute distress  Psychiatric:  Awake and alert  Pulmonary:  Normal respiratory excursion without accessory muscle use  Abdomen:  Soft, nontender  Extremities:  LLE in ACE wrap, no visible cellulitis on left thigh  Skin:  No rashes    Lab Results:  I have personally reviewed pertinent labs  Results from last 7 days   Lab Units 21  1020 21  0510 21  0505   POTASSIUM mmol/L 4 3 4 5 4 5   CHLORIDE mmol/L 100 101 101   CO2 mmol/L 31 32 34*   BUN mg/dL 16 16 13   CREATININE mg/dL 0 86 0 72 0 68   EGFR ml/min/1 73sq m 108 117 119   CALCIUM mg/dL 9 7 9 6 9 4     Results from last 7 days   Lab Units 21  1020 21  0510 21  0505   WBC Thousand/uL 12 66* 13 97* 15 31*   HEMOGLOBIN g/dL 8 6* 8 8* 8 5*   PLATELETS Thousands/uL 673* 677* 617*           Imaging Studies:   I have personally reviewed pertinent imaging study reports and images in PACS  EKG, Pathology, and Other Studies:   I have personally reviewed pertinent reports

## 2021-06-04 NOTE — OCCUPATIONAL THERAPY NOTE
Occupational Therapy Treatment Note:       06/04/21 1005   OT Last Visit   OT Visit Date 06/04/21   Note Type   Note Type Treatment   Restrictions/Precautions   Other Precautions Pain   Pain Assessment   Pain Assessment Tool 0-10   Pain Score Worst Possible Pain   ADL   Where Assessed Edge of bed   Grooming Assistance 5  Supervision/Setup   UB Bathing Assistance 5  Supervision/Setup   LB Bathing Assistance 3  Moderate Assistance   UB Dressing Assistance 5  Supervision/Setup   LB Dressing Assistance 3  Moderate Assistance   Toileting Comments s for urinal use  has no had bm in days per nsg   Functional Standing Tolerance   Time fair balance for 1 min of standing using rw minimally during adls   Bed Mobility   Supine to Sit 5  Supervision   Sit to Supine 5  Supervision   Transfers   Sit to Stand 5  Supervision   Stand to Sit 5  Supervision   Additional Comments pt used rw and took 1 step towards hob   Functional Mobility   Functional Mobility   (limited by pain)   Cognition   Overall Cognitive Status WFL   Activity Tolerance   Activity Tolerance Patient tolerated treatment well   Assessment   Assessment pt participated in am ot session and was seen focusing on sitting eob for over 25 min during adls  pt requires overall sba ub and grooming, mod asst lb  pt has not had a bm in days and reprots good appetite  pt  with improved tolerence to sit eob from previous pt session after speaking with pta  pt also stood for 1 min for anal hygiene using rw very minimally for support  pt took 1 side step towards hob and returned self to bed with sba  pt is motivated, wishes to get home asa voiced frustration over lengthy hospital stays now and in april    will follow focusing on goals from ie  currently pt'as adls mobility and toileting status  is limited by pain   pt with several dressings on buttocks and l thigh / and ace wrap on l lower leg and foot per nsg pt remains wbat l le   Plan   Treatment Interventions ADL retraining;Functional transfer training; Endurance training;Patient/family training;Equipment evaluation/education; Activityengagement   Goal Expiration Date 06/13/21   OT Treatment Day 1   OT Frequency 3-5x/wk   Recommendation   OT Discharge Recommendation Post acute rehabilitation services   Sharon Odonnell

## 2021-06-05 LAB
ANION GAP SERPL CALCULATED.3IONS-SCNC: 6 MMOL/L (ref 4–13)
BASOPHILS # BLD AUTO: 0.04 THOUSANDS/ΜL (ref 0–0.1)
BASOPHILS NFR BLD AUTO: 0 % (ref 0–1)
BUN SERPL-MCNC: 22 MG/DL (ref 5–25)
CALCIUM SERPL-MCNC: 9.7 MG/DL (ref 8.3–10.1)
CHLORIDE SERPL-SCNC: 100 MMOL/L (ref 100–108)
CO2 SERPL-SCNC: 30 MMOL/L (ref 21–32)
CREAT SERPL-MCNC: 0.83 MG/DL (ref 0.6–1.3)
EOSINOPHIL # BLD AUTO: 0.17 THOUSAND/ΜL (ref 0–0.61)
EOSINOPHIL NFR BLD AUTO: 2 % (ref 0–6)
ERYTHROCYTE [DISTWIDTH] IN BLOOD BY AUTOMATED COUNT: 16.6 % (ref 11.6–15.1)
GFR SERPL CREATININE-BSD FRML MDRD: 110 ML/MIN/1.73SQ M
GLUCOSE SERPL-MCNC: 146 MG/DL (ref 65–140)
GLUCOSE SERPL-MCNC: 152 MG/DL (ref 65–140)
GLUCOSE SERPL-MCNC: 158 MG/DL (ref 65–140)
GLUCOSE SERPL-MCNC: 179 MG/DL (ref 65–140)
HCT VFR BLD AUTO: 29.9 % (ref 36.5–49.3)
HGB BLD-MCNC: 8.8 G/DL (ref 12–17)
IMM GRANULOCYTES # BLD AUTO: 0.22 THOUSAND/UL (ref 0–0.2)
IMM GRANULOCYTES NFR BLD AUTO: 2 % (ref 0–2)
LYMPHOCYTES # BLD AUTO: 1.85 THOUSANDS/ΜL (ref 0.6–4.47)
LYMPHOCYTES NFR BLD AUTO: 16 % (ref 14–44)
MCH RBC QN AUTO: 25 PG (ref 26.8–34.3)
MCHC RBC AUTO-ENTMCNC: 29.4 G/DL (ref 31.4–37.4)
MCV RBC AUTO: 85 FL (ref 82–98)
MONOCYTES # BLD AUTO: 0.7 THOUSAND/ΜL (ref 0.17–1.22)
MONOCYTES NFR BLD AUTO: 6 % (ref 4–12)
NEUTROPHILS # BLD AUTO: 8.44 THOUSANDS/ΜL (ref 1.85–7.62)
NEUTS SEG NFR BLD AUTO: 74 % (ref 43–75)
NRBC BLD AUTO-RTO: 0 /100 WBCS
PLATELET # BLD AUTO: 708 THOUSANDS/UL (ref 149–390)
PMV BLD AUTO: 8.8 FL (ref 8.9–12.7)
POTASSIUM SERPL-SCNC: 4.3 MMOL/L (ref 3.5–5.3)
RBC # BLD AUTO: 3.52 MILLION/UL (ref 3.88–5.62)
SODIUM SERPL-SCNC: 136 MMOL/L (ref 136–145)
WBC # BLD AUTO: 11.42 THOUSAND/UL (ref 4.31–10.16)

## 2021-06-05 PROCEDURE — 82948 REAGENT STRIP/BLOOD GLUCOSE: CPT

## 2021-06-05 PROCEDURE — 97535 SELF CARE MNGMENT TRAINING: CPT

## 2021-06-05 PROCEDURE — 99024 POSTOP FOLLOW-UP VISIT: CPT | Performed by: INTERNAL MEDICINE

## 2021-06-05 PROCEDURE — 85025 COMPLETE CBC W/AUTO DIFF WBC: CPT | Performed by: SURGERY

## 2021-06-05 PROCEDURE — 97116 GAIT TRAINING THERAPY: CPT

## 2021-06-05 PROCEDURE — 80048 BASIC METABOLIC PNL TOTAL CA: CPT | Performed by: SURGERY

## 2021-06-05 RX ADMIN — POLYETHYLENE GLYCOL 3350 17 G: 17 POWDER, FOR SOLUTION ORAL at 09:35

## 2021-06-05 RX ADMIN — CEFAZOLIN SODIUM 2000 MG: 2 SOLUTION INTRAVENOUS at 21:47

## 2021-06-05 RX ADMIN — HYDROMORPHONE HYDROCHLORIDE 0.5 MG: 1 INJECTION, SOLUTION INTRAMUSCULAR; INTRAVENOUS; SUBCUTANEOUS at 11:15

## 2021-06-05 RX ADMIN — CEFAZOLIN SODIUM 2000 MG: 2 SOLUTION INTRAVENOUS at 05:02

## 2021-06-05 RX ADMIN — INSULIN LISPRO 2 UNITS: 100 INJECTION, SOLUTION INTRAVENOUS; SUBCUTANEOUS at 09:36

## 2021-06-05 RX ADMIN — TICAGRELOR 90 MG: 90 TABLET ORAL at 21:47

## 2021-06-05 RX ADMIN — TICAGRELOR 90 MG: 90 TABLET ORAL at 09:34

## 2021-06-05 RX ADMIN — GABAPENTIN 600 MG: 300 CAPSULE ORAL at 17:05

## 2021-06-05 RX ADMIN — INSULIN LISPRO 2 UNITS: 100 INJECTION, SOLUTION INTRAVENOUS; SUBCUTANEOUS at 17:05

## 2021-06-05 RX ADMIN — CARVEDILOL 6.25 MG: 6.25 TABLET, FILM COATED ORAL at 09:34

## 2021-06-05 RX ADMIN — ENOXAPARIN SODIUM 60 MG: 60 INJECTION SUBCUTANEOUS at 21:47

## 2021-06-05 RX ADMIN — GABAPENTIN 600 MG: 300 CAPSULE ORAL at 21:47

## 2021-06-05 RX ADMIN — CARVEDILOL 6.25 MG: 6.25 TABLET, FILM COATED ORAL at 17:05

## 2021-06-05 RX ADMIN — HYDROMORPHONE HYDROCHLORIDE 0.5 MG: 1 INJECTION, SOLUTION INTRAMUSCULAR; INTRAVENOUS; SUBCUTANEOUS at 17:09

## 2021-06-05 RX ADMIN — ACETAMINOPHEN 975 MG: 325 TABLET, FILM COATED ORAL at 21:47

## 2021-06-05 RX ADMIN — HYDROMORPHONE HYDROCHLORIDE 0.5 MG: 1 INJECTION, SOLUTION INTRAMUSCULAR; INTRAVENOUS; SUBCUTANEOUS at 22:33

## 2021-06-05 RX ADMIN — INSULIN LISPRO 2 UNITS: 100 INJECTION, SOLUTION INTRAVENOUS; SUBCUTANEOUS at 12:23

## 2021-06-05 RX ADMIN — NYSTATIN 1 APPLICATION: 100000 POWDER TOPICAL at 17:05

## 2021-06-05 RX ADMIN — HYDROMORPHONE HYDROCHLORIDE 0.5 MG: 1 INJECTION, SOLUTION INTRAMUSCULAR; INTRAVENOUS; SUBCUTANEOUS at 13:22

## 2021-06-05 RX ADMIN — OXYCODONE HYDROCHLORIDE 15 MG: 10 TABLET ORAL at 05:02

## 2021-06-05 RX ADMIN — ACETAMINOPHEN 975 MG: 325 TABLET, FILM COATED ORAL at 05:02

## 2021-06-05 RX ADMIN — OXYCODONE HYDROCHLORIDE 15 MG: 10 TABLET ORAL at 09:35

## 2021-06-05 RX ADMIN — ASPIRIN 81 MG: 81 TABLET, COATED ORAL at 09:34

## 2021-06-05 RX ADMIN — NYSTATIN 1 APPLICATION: 100000 POWDER TOPICAL at 09:38

## 2021-06-05 RX ADMIN — DOCUSATE SODIUM AND SENNOSIDES 2 TABLET: 8.6; 5 TABLET ORAL at 17:05

## 2021-06-05 RX ADMIN — GABAPENTIN 600 MG: 300 CAPSULE ORAL at 09:34

## 2021-06-05 RX ADMIN — CEFAZOLIN SODIUM 2000 MG: 2 SOLUTION INTRAVENOUS at 13:22

## 2021-06-05 RX ADMIN — MELATONIN TAB 3 MG 6 MG: 3 TAB at 21:47

## 2021-06-05 RX ADMIN — ACETAMINOPHEN 975 MG: 325 TABLET, FILM COATED ORAL at 13:22

## 2021-06-05 RX ADMIN — OXYCODONE HYDROCHLORIDE 15 MG: 10 TABLET ORAL at 19:26

## 2021-06-05 RX ADMIN — DOCUSATE SODIUM AND SENNOSIDES 2 TABLET: 8.6; 5 TABLET ORAL at 09:34

## 2021-06-05 RX ADMIN — HYDROMORPHONE HYDROCHLORIDE 0.5 MG: 1 INJECTION, SOLUTION INTRAMUSCULAR; INTRAVENOUS; SUBCUTANEOUS at 20:24

## 2021-06-05 RX ADMIN — OXYCODONE HYDROCHLORIDE 15 MG: 10 TABLET ORAL at 14:59

## 2021-06-05 RX ADMIN — ENOXAPARIN SODIUM 60 MG: 60 INJECTION SUBCUTANEOUS at 09:35

## 2021-06-05 NOTE — PHYSICAL THERAPY NOTE
PHYSICAL THERAPY NOTE          Patient Name: Simeon Mark  QRTPP'E Date: 6/5/2021 06/05/21 1126   PT Last Visit   PT Visit Date 06/05/21   Note Type   Note Type Treatment   Pain Assessment   Pain Assessment Tool 0-10   Pain Score Worst Possible Pain   Pain Location/Orientation Orientation: Left; Location: Leg   Patient's Stated Pain Goal No pain   Hospital Pain Intervention(s) Repositioned; Ambulation/increased activity; Rest   Restrictions/Precautions   Weight Bearing Precautions Per Order No   Other Precautions Fall Risk;Pain   General   Chart Reviewed Yes   Response to Previous Treatment Patient with no complaints from previous session  Family/Caregiver Present No   Cognition   Overall Cognitive Status WFL   Arousal/Participation Alert   Attention Within functional limits   Orientation Level Oriented X4   Memory Within functional limits   Following Commands Follows all commands and directions without difficulty   Subjective   Subjective Pt states he feels he can manage at home and he has been trying to do things by himself this AM    Bed Mobility   Supine to Sit 6  Modified independent   Additional items HOB elevated; Bedrails; Increased time required   Additional Comments Supine in bed upon PT arrival   Pt left upright in bedside chair with all needs in reach at end of therapy session  Transfers   Sit to Stand 5  Supervision   Additional items Increased time required;Verbal cues   Stand to Sit 5  Supervision   Additional items Increased time required;Verbal cues   Additional Comments Transfers with raul RW   Ambulation/Elevation   Gait pattern Excessively slow  (increased lateral sway)   Gait Assistance 5  Supervision   Additional items Verbal cues; Tactile cues   Assistive Device Bariatric Rolling walker   Distance 50 ft x 2   Stair Management Assistance 5  Supervision   Additional items Verbal cues   Stair Management Technique Two rails   Number of Stairs 6   Balance   Static Sitting Good   Dynamic Sitting Fair +   Static Standing Fair +   Dynamic Standing Fair   Ambulatory Fair -   Endurance Deficit   Endurance Deficit Yes   Endurance Deficit Description pain, fatigue   Activity Tolerance   Activity Tolerance Patient limited by fatigue;Patient limited by pain   Nurse Made Aware RN cleared pt to be seen by PT   Assessment   Prognosis Good   Problem List Decreased strength;Decreased endurance; Impaired balance;Pain   Assessment Pt seen for PT treatment session with focus on bed mobility, functional transfers, functional mobiltiy  Pt making steady progress toward goals this session  Pt with much improved pain control this session despite reports of 10/10 pain; pt does not appear too limited in participation in therapy by pain  Pt able to ambulate distances needed to navigate home as well as negotiate steps to enter/exit home without hands on assist   Pt with few LOB during ambulation, however, able to self correct with RW  Pt reports he feels confident he can manage at home  Pt left upright in bedside chair with all needs in reach  Pt denies any concerns regarding mobility upon DC  PT to DC pt as pt has no further acute skilled PT needs and recommending HHPT once medically cleared  The patient's AM-PAC Basic Mobility Inpatient Short Form Raw Score is 20, Standardized Score is 43 99  A standardized score greater than 42 9 suggests the patient may benefit from discharge to home  Please also refer to the recommendation of the Physical Therapist for safe discharge planning  Barriers to Discharge None   Goals   Patient Goals to go home   Plan   Progress Discontinue PT   Recommendation   PT Discharge Recommendation Home with home health rehabilitation   Carley Anguiano Dr Recommended HD Bariatric wheeled walker   Change/add to klinify?  No   PT - OK to Discharge Yes  (once medically cleared)   AM-PAC Basic Mobility Inpatient   Turning in Bed Without Bedrails 4   Lying on Back to Sitting on Edge of Flat Bed 4   Moving Bed to Chair 3   Standing Up From Chair 3   Walk in Room 3   Climb 3-5 Stairs 3   Basic Mobility Inpatient Raw Score 20   Basic Mobility Standardized Score 43 99     [place] Elvia Webber, PT, DPT

## 2021-06-05 NOTE — PROGRESS NOTES
Progress Note - Caroline Mode 37 y o  male MRN: [id number]    Unit/Bed#: [facility] 711-70 Encounter: [id number]      Assessment:  36 y/o M p/w LLE cellulitis, L thigh fluid collection, s/p bedside I&D on 5/26, OR I&D on 5/26, L thigh washout/closure on 5/28    Plan:    Continue diabetic diet  Continue daily dressing changes to the left lower extremity  Continue antibiotics  Follow-up dispo planning  Subjective:     Denied any fevers, chills, chest pain or shortness of breath today  Objective:     Vitals: Blood pressure 135/76, pulse 61, temperature 98 7 °F (37 1 °C), resp  rate 17, height 5' 7" (1 702 m), weight (!) 164 kg (361 lb 4 8 oz), SpO2 93 %  ,Body mass index is 56 59 kg/m²  Intake/Output Summary (Last 24 hours) at 6/5/2021 0854  Last data filed at 6/5/2021 0616  Gross per 24 hour   Intake 775 ml   Output 1700 ml   Net -925 ml       Physical Exam  General: NAD  HEENT: NC/AT  MMM  Cv: RRR     Lungs: normal effort  Ab: Soft, NT/ND  Ex: no CCE  Neuro: AAOx3    Scheduled Meds:  Current Facility-Administered Medications   Medication Dose Route Frequency Provider Last Rate    acetaminophen  975 mg Oral On license of [facility] Angelina Mak PA-C      aspirin  81 mg Oral Daily Angelina Mak PA-C      bisacodyl  10 mg Rectal Daily PRN Angelina Mak PA-C      carvedilol  6 25 mg Oral BID With Meals Angelina Mak PA-C      cefazolin  2,000 mg Intravenous Q8H Ashley Locke MD Stopped (06/05/21 7746)    enoxaparin  60 mg Subcutaneous Q12H [address] Angelina Mak PA-C      gabapentin  600 mg Oral TID Irish Small PA-C      HYDROmorphone  0 5 mg Intravenous Q2H PRN Irish Small PA-C      insulin lispro  2-12 Units Subcutaneous TID AC Angelina Mak PA-C      melatonin  6 mg Oral HS Angelina Mak PA-C      nystatin  1 application Topical BID Irish Small PA-C      ondansetron  4 mg Intravenous Q6H PRN Angelina Mak PA-C      oxyCODONE  10 mg Oral Q4H PRN Irish Small, [place]      oxyCODONE  15 mg Oral Q4H PRN Elida Rinaldi PA-C      polyethylene glycol  17 g Oral Daily Edvin Etienne      senna-docusate sodium  2 tablet Oral BID Marybel Harrell PA-C      sodium chloride  2 spray Each Nare Q1H PRN Nick Gloria MD      ticagrelor  90 mg Oral Q12H [facility] Marybel Harrell PA-C       Continuous Infusions:   PRN Meds: bisacodyl    HYDROmorphone    ondansetron    oxyCODONE    oxyCODONE    sodium chloride      Invasive Devices     Peripheral Intravenous Line            Peripheral IV 06/04/21 Right;Upper Forearm less than 1 day          Drain            Open Drain Left Thigh 7 days    Open Drain Left Thigh 7 days                Lab, Imaging and other studies: I have personally reviewed pertinent reports      VTE Pharmacologic Prophylaxis: Sequential compression device (Venodyne)   VTE Mechanical Prophylaxis: sequential compression device

## 2021-06-05 NOTE — PLAN OF CARE
Problem: OCCUPATIONAL THERAPY ADULT  Goal: Performs self-care activities at highest level of function for planned discharge setting  See evaluation for individualized goals  Description: Treatment Interventions: ADL retraining, Functional transfer training, Endurance training, Patient/family training, Equipment evaluation/education, Compensatory technique education, Energy conservation, Activityengagement          See flowsheet documentation for full assessment, interventions and recommendations  Outcome: Progressing  Note: Limitation: Decreased ADL status, Decreased endurance, Decreased self-care trans, Decreased high-level ADLs  Prognosis: Good  Assessment: Patient participated in Skilled OT session 6/5/2021 with interventions consisting of ADL re training with the use of correct body mechnaics, Energy Conservation techniques, safety awareness and fall prevention techniques, therapeutic exercise to: increase functional use of BUEs, increase BUE muscle strength ,  therapeutic activities to: increase activity tolerance, increase standing tolerance time with unilateral UE support to complete sink level ADLs, increase dynamic sit/ stand balance during functional activity , increase postural control, increase trunk control and increase OOB/ sitting tolerance   Patient agreeable to OT treatment session, upon arrival patient was found supine in bed  In comparison to previous session, patient with improvements in transfers, bed mobility, functional mobility, dynamic/static standing balance, and endurance   Patient requiring verbal cues for correct technique and ocassional safety reminders  Patient continues to be functioning below baseline level, occupational performance remains limited secondary to factors listed above and increased risk for falls and injury  From OT standpoint, recommendation at time of d/c would be Home with family support, when medically stable     Patient to benefit from continued Occupational Therapy treatment while in the hospital to address deficits as defined above and maximize level of functional independence with ADLs and functional mobility        OT Discharge Recommendation: No rehabilitation needs  OT - OK to Discharge: Yes(when medically stable)     Samaria Rivas MS, OTR/L

## 2021-06-05 NOTE — OCCUPATIONAL THERAPY NOTE
Occupational Therapy Treatment Note      Luetta Cheadle    6/5/2021    Principal Problem:    Sepsis ([facility] )  Active Problems:    Shortness of breath    Difficult intubation    Diabetic neuropathy (HCC)      Past Medical History:   Diagnosis Date    Coronary artery disease     Diabetes mellitus ([facility] )     Heart disease     CAD   s/p ptca with 1 stent 2012    Hidradenitis suppurativa     groin    Hyperlipidemia     Hypertension     MI (myocardial infarction) ([facility] )     " silent " M I  in the past    Morbid obesity with BMI of 50 0-59 9, adult ([place] [address] )     Myocardial infarction ([facility] )     Nicotine dependence     Obesity     Pilonidal cyst        Past Surgical History:   Procedure Laterality Date    CORONARY ANGIOPLASTY WITH STENT PLACEMENT      INCISION AND DRAINAGE OF WOUND N/A 1/24/2017    Procedure: INCISION AND DRAINAGE (I&D) BUTTOCK, PILONIDAL CYST;  Surgeon: Bing Levi MD;  Location: [address] [place] [address];  Service:    Juju Bashir LEG SURGERY Left     I&D of left leg 2012    UT EXC SKIN BENIG >4 CM TRUNK,ARM,LEG Left 4/6/2021    Procedure: EXCISION THIGH MASS;  Surgeon: Amy Mills MD;  [facility];  Service: General    UT NEG PRESS WOUND TX, < 50 CM Left 4/6/2021    Procedure: APPLICATION VAC DRESSING THIGH;  Surgeon: Amy Mills MD;  [facility];  Service: General    WOUND DEBRIDEMENT Left 4/17/2021    Procedure: DEBRIDEMENT WOUND AND DRESSING CHANGE ([address]); Surgeon: Galileo Fountain DO;  [facility];  Service: General    WOUND DEBRIDEMENT Left 4/22/2021    Procedure: DEBRIDEMENT LOWER EXTREMITY [facility] OUT), left groin and thigh;  Surgeon: Amy Mills MD;  [facility];  Service: General    WOUND DEBRIDEMENT Left 5/26/2021    Procedure: DEBRIDEMENT LOWER EXTREMITY ([address] OUT); Surgeon:  Otilia Alpers, DO;  [facility];  Service: General    WOUND DEBRIDEMENT Left 5/28/2021    Procedure: Washout left thigh wound and closure;  Surgeon: Clinton Fisher DO;  [facility];  Service: General        06/05/21 1438   OT Last Visit   OT Visit Date 06/05/21   Note Type   Note Type Treatment   Restrictions/Precautions   Weight Bearing Precautions Per Order No   Other Precautions Multiple lines; Fall Risk;Pain   Lifestyle   Autonomy I adls and mobility - i iadls - shares homemaking with son    Reciprocal Relationships supportive family and friends   Service to Others disabled    Intrinsic Gratification active pta - enjoys fishing/outdoors   Pain Assessment   Pain Assessment Tool 0-10   Pain Score 8   Pain Location/Orientation Orientation: Left; Location: Leg   Pain Onset/Description Onset: Ongoing; Descriptor: Aching;Descriptor: Discomfort   Patient's Stated Pain Goal No pain   Hospital Pain Intervention(s) Repositioned; Ambulation/increased activity; Emotional support   ADL   LB Dressing Assistance 2  Maximal Assistance   LB Dressing Deficit Don/doff L sock   LB Dressing Comments Pt reports being unable to don socks and reports difficulty at baseline w/ donning socks  Reports however that his son will be home to assist and can help don socks when needed  Pt did not want to trial use of LHAE or sock-aide to increased independence in LB dressing   Functional Standing Tolerance   Time 2 mins   Activity Simulation of showering in bath tub; maintained dynamic/static balance w/ Fair support  No use of DME required  Bed Mobility   Supine to Sit 6  Modified independent   Additional items Verbal cues   Sit to Supine 6  Modified independent   Additional items Verbal cues   Additional Comments Pt went from supine to sit w/ Mod I; HOB elevated for assist  Sat EOB w/ G balance/trunkcontrol  Use of bed rails for support  Pt reports at home does not have bed rails but it's easier to get in/out of bed     Transfers   Sit to Stand 5  Supervision   Additional items Verbal cues   Stand to Sit 5  Supervision   Additional items Verbal cues   Additional Comments Pt performed STS from EOB w/ S, no DME used Functional Mobility   Functional Mobility 5  Supervision   Additional Comments Pt ambulated short in room distance w/ S, use of RW  Additional items Rolling walker   Toilet Transfers   Toilet Transfers Comments Pt reports he has transferred on/off hospital toilet several times w/o difficulty and use of grab bar  Reports his toilet @ home is higher than the hospital toilet and he foresees no challenges w/ transferring to and from toilet @ home   Cognition   Overall Cognitive Status WFL   Arousal/Participation Cooperative;Responsive   Attention Within functional limits   Orientation Level Oriented X4   Memory Within functional limits   Following Commands Follows all commands and directions without difficulty   Comments Pt is pleasant and cooperative   Additional Activities   Additional Activities Other (Comment)  (Simulation of ADL tasks)   Additional Activities Comments Discussed w/ pt home setup, recommended DME and simulation of tranfers/ADL tasks  Pt able to simulate tub transfer w/ S  Reports being unable to bring RW into bathroom but can use walls as support if needed  Pt demonstrated fair dynamic standing balance to simulate showering in tub  Pt reports he does not want to use s/c @ home  Pt able to ambulate short household distances w/ RW to complete IADL tasks  Reports son will be home to assist as needed and assist w/ LB dressing   Pt does not want to utilize [facility] or other DME aside from RW   Activity Tolerance   Activity Tolerance Patient tolerated treatment well   Medical Staff Made Aware RN   Assessment   Assessment Patient participated in Skilled OT session 6/5/2021 with interventions consisting of ADL re training with the use of correct body mechnaics, Energy Conservation techniques, safety awareness and fall prevention techniques, therapeutic exercise to: increase functional use of BUEs, increase BUE muscle strength ,  therapeutic activities to: increase activity tolerance, increase standing tolerance time with unilateral UE support to complete sink level ADLs, increase dynamic sit/ stand balance during functional activity , increase postural control, increase trunk control and increase OOB/ sitting tolerance   Patient agreeable to OT treatment session, upon arrival patient was found supine in bed  In comparison to previous session, patient with improvements in transfers, bed mobility, functional mobility, dynamic/static standing balance, and endurance   Patient requiring verbal cues for correct technique and ocassional safety reminders  Patient continues to be functioning below baseline level, occupational performance remains limited secondary to factors listed above and increased risk for falls and injury  From OT standpoint, recommendation at time of d/c would be Home with family support, when medically stable  Patient to benefit from continued Occupational Therapy treatment while in the hospital to address deficits as defined above and maximize level of functional independence with ADLs and functional mobility  Plan   Treatment Interventions ADL retraining;Functional transfer training; Endurance training;Equipment evaluation/education; Compensatory technique education;Continued evaluation; Energy conservation; Activityengagement   Goal Expiration Date 06/13/21   OT Treatment Day 2   OT Frequency 3-5x/wk   Recommendation   OT Discharge Recommendation No rehabilitation needs   OT - OK to Discharge Yes  (when medically stable)   Additional Comments  when medically stable; w/ increased family support   Additional Comments 2 The patient's raw score on the -PAC Daily Activity inpatient short form is 20, standardized score is 42 03, greater than 39 4  Patients at this level are likely to benefit from discharge to home  Please refer to the recommendation of the Occupational Therapist for safe discharge planning     AM-PAC Daily Activity Inpatient   Lower Body Dressing 2   Bathing 3   Toileting 3 Upper Body Dressing 4   Grooming 4   Eating 4   Daily Activity Raw Score 20   Daily Activity Standardized Score (Calc for Raw Score >=11) 42 03   AM-PAC Applied Cognition Inpatient   Following a Speech/Presentation 4   Understanding Ordinary Conversation 4   Taking Medications 4   Remembering Where Things Are Placed or Put Away 4   Remembering List of 4-5 Errands 4   Taking Care of Complicated Tasks 4   Applied Cognition Raw Score 24   Applied Cognition Standardized Score 62 21       Missy Shane MS, OTR/L

## 2021-06-05 NOTE — PLAN OF CARE
Problem: PHYSICAL THERAPY ADULT  Goal: Performs mobility at highest level of function for planned discharge setting  See evaluation for individualized goals  Description: Treatment/Interventions: Functional transfer training, LE strengthening/ROM, Elevations, Therapeutic exercise, Endurance training, Patient/family training, Equipment eval/education, Bed mobility, Gait training, Spoke to nursing, OT  Equipment Recommended: Tony Ramachandran       See flowsheet documentation for full assessment, interventions and recommendations  Outcome: Adequate for Discharge  Note: Prognosis: Good  Problem List: Decreased strength, Decreased endurance, Impaired balance, Pain  Assessment: Pt seen for PT treatment session with focus on bed mobility, functional transfers, functional mobiltiy  Pt making steady progress toward goals this session  Pt with much improved pain control this session despite reports of 10/10 pain; pt does not appear too limited in participation in therapy by pain  Pt able to ambulate distances needed to navigate home as well as negotiate steps to enter/exit home without hands on assist   Pt with few LOB during ambulation, however, able to self correct with RW  Pt reports he feels confident he can manage at home  Pt left upright in bedside chair with all needs in reach  Pt denies any concerns regarding mobility upon DC  PT to DC pt as pt has no further acute skilled PT needs and recommending HHPT once medically cleared  The patient's AM-PAC Basic Mobility Inpatient Short Form Raw Score is 20, Standardized Score is 43 99  A standardized score greater than 42 9 suggests the patient may benefit from discharge to home  Please also refer to the recommendation of the Physical Therapist for safe discharge planning    Barriers to Discharge: None        PT Discharge Recommendation: Home with home health rehabilitation     PT - OK to Discharge: Yes(once medically cleared)    See flowsheet documentation for full assessment

## 2021-06-06 VITALS
TEMPERATURE: 98.4 F | RESPIRATION RATE: 17 BRPM | OXYGEN SATURATION: 92 % | DIASTOLIC BLOOD PRESSURE: 65 MMHG | WEIGHT: 315 LBS | HEIGHT: 67 IN | BODY MASS INDEX: 49.44 KG/M2 | HEART RATE: 71 BPM | SYSTOLIC BLOOD PRESSURE: 117 MMHG

## 2021-06-06 LAB
ANION GAP SERPL CALCULATED.3IONS-SCNC: 7 MMOL/L (ref 4–13)
BASOPHILS # BLD AUTO: 0.04 THOUSANDS/ΜL (ref 0–0.1)
BASOPHILS NFR BLD AUTO: 0 % (ref 0–1)
BUN SERPL-MCNC: 24 MG/DL (ref 5–25)
CALCIUM SERPL-MCNC: 9.3 MG/DL (ref 8.3–10.1)
CHLORIDE SERPL-SCNC: 100 MMOL/L (ref 100–108)
CO2 SERPL-SCNC: 30 MMOL/L (ref 21–32)
CREAT SERPL-MCNC: 0.99 MG/DL (ref 0.6–1.3)
EOSINOPHIL # BLD AUTO: 0.16 THOUSAND/ΜL (ref 0–0.61)
EOSINOPHIL NFR BLD AUTO: 2 % (ref 0–6)
ERYTHROCYTE [DISTWIDTH] IN BLOOD BY AUTOMATED COUNT: 16.5 % (ref 11.6–15.1)
GFR SERPL CREATININE-BSD FRML MDRD: 95 ML/MIN/1.73SQ M
GLUCOSE SERPL-MCNC: 135 MG/DL (ref 65–140)
GLUCOSE SERPL-MCNC: 155 MG/DL (ref 65–140)
GLUCOSE SERPL-MCNC: 177 MG/DL (ref 65–140)
HCT VFR BLD AUTO: 30.7 % (ref 36.5–49.3)
HGB BLD-MCNC: 8.9 G/DL (ref 12–17)
IMM GRANULOCYTES # BLD AUTO: 0.11 THOUSAND/UL (ref 0–0.2)
IMM GRANULOCYTES NFR BLD AUTO: 1 % (ref 0–2)
LYMPHOCYTES # BLD AUTO: 2.08 THOUSANDS/ΜL (ref 0.6–4.47)
LYMPHOCYTES NFR BLD AUTO: 20 % (ref 14–44)
MCH RBC QN AUTO: 24.5 PG (ref 26.8–34.3)
MCHC RBC AUTO-ENTMCNC: 29 G/DL (ref 31.4–37.4)
MCV RBC AUTO: 84 FL (ref 82–98)
MONOCYTES # BLD AUTO: 0.7 THOUSAND/ΜL (ref 0.17–1.22)
MONOCYTES NFR BLD AUTO: 7 % (ref 4–12)
NEUTROPHILS # BLD AUTO: 7.49 THOUSANDS/ΜL (ref 1.85–7.62)
NEUTS SEG NFR BLD AUTO: 70 % (ref 43–75)
NRBC BLD AUTO-RTO: 0 /100 WBCS
PLATELET # BLD AUTO: 679 THOUSANDS/UL (ref 149–390)
PMV BLD AUTO: 8.8 FL (ref 8.9–12.7)
POTASSIUM SERPL-SCNC: 4.7 MMOL/L (ref 3.5–5.3)
RBC # BLD AUTO: 3.64 MILLION/UL (ref 3.88–5.62)
SODIUM SERPL-SCNC: 137 MMOL/L (ref 136–145)
WBC # BLD AUTO: 10.58 THOUSAND/UL (ref 4.31–10.16)

## 2021-06-06 PROCEDURE — 85025 COMPLETE CBC W/AUTO DIFF WBC: CPT | Performed by: SURGERY

## 2021-06-06 PROCEDURE — NC001 PR NO CHARGE: Performed by: SURGERY

## 2021-06-06 PROCEDURE — 82948 REAGENT STRIP/BLOOD GLUCOSE: CPT

## 2021-06-06 PROCEDURE — 80048 BASIC METABOLIC PNL TOTAL CA: CPT | Performed by: SURGERY

## 2021-06-06 PROCEDURE — 99024 POSTOP FOLLOW-UP VISIT: CPT | Performed by: SURGERY

## 2021-06-06 RX ORDER — CEPHALEXIN 750 MG/1
750 CAPSULE ORAL 4 TIMES DAILY
Qty: 28 CAPSULE | Refills: 0 | Status: SHIPPED | OUTPATIENT
Start: 2021-06-06 | End: 2021-06-13

## 2021-06-06 RX ORDER — OXYCODONE HYDROCHLORIDE 10 MG/1
10 TABLET ORAL EVERY 4 HOURS PRN
Qty: 30 TABLET | Refills: 0 | Status: SHIPPED | OUTPATIENT
Start: 2021-06-06 | End: 2021-06-11

## 2021-06-06 RX ADMIN — CARVEDILOL 6.25 MG: 6.25 TABLET, FILM COATED ORAL at 09:18

## 2021-06-06 RX ADMIN — ACETAMINOPHEN 975 MG: 325 TABLET, FILM COATED ORAL at 04:16

## 2021-06-06 RX ADMIN — OXYCODONE HYDROCHLORIDE 15 MG: 10 TABLET ORAL at 04:16

## 2021-06-06 RX ADMIN — OXYCODONE HYDROCHLORIDE 15 MG: 10 TABLET ORAL at 13:18

## 2021-06-06 RX ADMIN — GABAPENTIN 600 MG: 300 CAPSULE ORAL at 09:18

## 2021-06-06 RX ADMIN — DOCUSATE SODIUM AND SENNOSIDES 2 TABLET: 8.6; 5 TABLET ORAL at 09:18

## 2021-06-06 RX ADMIN — CEFAZOLIN SODIUM 2000 MG: 2 SOLUTION INTRAVENOUS at 05:08

## 2021-06-06 RX ADMIN — ASPIRIN 81 MG: 81 TABLET, COATED ORAL at 09:18

## 2021-06-06 RX ADMIN — INSULIN LISPRO 2 UNITS: 100 INJECTION, SOLUTION INTRAVENOUS; SUBCUTANEOUS at 09:19

## 2021-06-06 RX ADMIN — TICAGRELOR 90 MG: 90 TABLET ORAL at 09:19

## 2021-06-06 RX ADMIN — POLYETHYLENE GLYCOL 3350 17 G: 17 POWDER, FOR SOLUTION ORAL at 09:19

## 2021-06-06 RX ADMIN — INSULIN LISPRO 2 UNITS: 100 INJECTION, SOLUTION INTRAVENOUS; SUBCUTANEOUS at 13:18

## 2021-06-06 RX ADMIN — ENOXAPARIN SODIUM 60 MG: 60 INJECTION SUBCUTANEOUS at 09:19

## 2021-06-06 RX ADMIN — NYSTATIN 1 APPLICATION: 100000 POWDER TOPICAL at 09:19

## 2021-06-06 RX ADMIN — OXYCODONE HYDROCHLORIDE 15 MG: 10 TABLET ORAL at 09:18

## 2021-06-06 RX ADMIN — ACETAMINOPHEN 975 MG: 325 TABLET, FILM COATED ORAL at 13:17

## 2021-06-06 NOTE — CASE MANAGEMENT
Pt accepted by [facility] HARINI RIVERA informed them that pt will d/c home today   They will follow up with pt for start of care

## 2021-06-06 NOTE — PROGRESS NOTES
Progress Note - General Surgery   Javon Goldberg 37 y o  male MRN: [id number]  Unit/Bed#: [facility] 107-56 Encounter: [id number]    Assessment:  38 y/o M p/w LLE cellulitis, L thigh fluid collection, s/p bedside I&D on 5/26, OR I&D on 5/26, L thigh washout/closure on 5/28    Plan:  Continue Diabetic diet  Daily dressing changes to left lower extremity per nursing  Continue antibiotics, can transition to keflex on discharge for 14 day total course  PT/OT now recommending home with [facility]    Subjective/Objective     Subjective: Complaining of swelling and some pain in left leg, tolerating diet without nausea or vomiting    Objective:    Blood pressure 115/61, pulse 69, temperature 98 5 °F (36 9 °C), resp  rate 17, height 5' 7" (1 702 m), weight (!) 159 kg (350 lb 14 4 oz), SpO2 92 %  ,Body mass index is 54 96 kg/m²  Intake/Output Summary (Last 24 hours) at 6/6/2021 0854  Last data filed at 6/6/2021 0418  Gross per 24 hour   Intake 2470 ml   Output 2100 ml   Net 370 ml       Invasive Devices     Peripheral Intravenous Line            Peripheral IV 06/04/21 Right;Upper Forearm 1 day          Drain            Open Drain Left Thigh 8 days    Open Drain Left Thigh 8 days                Physical Exam:  Gen:    NAD  CV:      warm, well-perfused  Lungs: No respiratory distress on RA  Abd:     soft, NT/ND  Ext:      Left leg swollen, left thigh wound with dressing in place c/d/i, palpable distal pulses  Neuro: A&Ox3       Lab, Imaging and other studies:I have personally reviewed pertinent lab results      VTE Pharmacologic Prophylaxis: Enoxaparin (Lovenox)  VTE Mechanical Prophylaxis: sequential compression device

## 2021-06-06 NOTE — QUICK NOTE
Quick note    Two  Left thigh [place] drains were removed without incident      Kerry Rangel MD  12:54 PM  6/6/21

## 2021-06-06 NOTE — DISCHARGE SUMMARY
Discharge Summary - Lisa Hale 37 y o  male MRN: [id number]    Unit/Bed#: [facility] 161-63 Encounter: [id number]    Admission Date:   Admission Orders (From admission, onward)     Ordered        05/27/21 0753  Inpatient Admission  Once         05/26/21 1450  Place in Observation  Once                     Admitting Diagnosis: Cutaneous abscess of groin [L02 214]  Thigh abscess [L02 419]    HPI:  Patient is a 81-year-old male with past medical history of obesity, myocardial infarction, hypertension hyperlipidemia, diabetes, presents on 5/26 with cellulitis of the left lower extremity and rule out necrotizing fasciitis  CT abdomen pelvis on 05/26 showing subcutaneous abscess within the left anterior upper thigh underlying superficial wound with reactive inguinal and external iliac lymphadenopathy  On 05/26 patient underwent bedside incision and drainage, followed by incision exploration of the left thigh, on 05/28 patient underwent left thigh washout and closure  Patient was maintained on antibiotics throughout the course of his stay in the hospital   From 526-528 patient was on cefepime Flagyl clindamycin  And for the rest of his admission from 05/28 and till 6/6 patient was on Ancef  Patient was discharged on 7 days of Keflex  Infectious Disease was following throughout the course of his admission  Patient did have wound cultures positive for Gram-positive cocci in pairs  For the last week of the patient's hospital stay his leukocytosis markedly improved, he was afebrile, he was tolerating a diet, he was ambulating, he was having bowel function  His vital signs were continually stable, and he was deemed medically stable for discharge with outpatient follow-up with General surgery  Home healthcare was arranged with case management prior to discharge  Patient was discharged with narcotic pain medication for 1 week  Patient was deemed medically stable for discharge on 06/06/2021      Procedures Performed: Orders Placed This Encounter   Procedures    Incision and Drainage       Summary of Hospital Course: see above  Significant Findings, Care, Treatment and Services Provided: none    Complications: none  Discharge Diagnosis: same  Resolved Problems  Date Reviewed: 5/29/2021    None          Condition at Discharge: good         Discharge instructions/Information to patient and family:   See after visit summary for information provided to patient and family  Provisions for Follow-Up Care:  See after visit summary for information related to follow-up care and any pertinent home health orders  PCP: Jane Aguilar DO    Disposition: See After Visit Summary for discharge disposition information  Planned Readmission: No      Discharge Statement   I spent 30 minutes discharging the patient  This time was spent on the day of discharge  I had direct contact with the patient on the day of discharge  Additional documentation is required if more than 30 minutes were spent on discharge  Discharge Medications:  See after visit summary for reconciled discharge medications provided to patient and family

## 2021-06-06 NOTE — DISCHARGE INSTRUCTIONS
Continue local wound care to left thigh  Let soap and water run over the wound, ok to shower, but no swimming or submersion bathing  Pat dry  Place non adhesive dressing over left thigh wound  Place 4x4 and abd pad over the wound  Expect some drainage from the thigh where the penrose drains were removed from  For the left leg, continue to wrap the left leg with kerlix and ace bandage  Elevate your left leg while sitting  Please call office with questions or concerns

## 2021-06-06 NOTE — PLAN OF CARE
Problem: Nutrition/Hydration-ADULT  Goal: Nutrient/Hydration intake appropriate for improving, restoring or maintaining nutritional needs  Description: Monitor and assess patient's nutrition/hydration status for malnutrition  Collaborate with interdisciplinary team and initiate plan and interventions as ordered  Monitor patient's weight and dietary intake as ordered or per policy  Utilize nutrition screening tool and intervene as necessary  Determine patient's food preferences and provide high-protein, high-caloric foods as appropriate       INTERVENTIONS:  - Monitor oral intake, urinary output, labs, and treatment plans  - Assess nutrition and hydration status and recommend course of action  - Evaluate amount of meals eaten  - Assist patient with eating if necessary   - Allow adequate time for meals  - Recommend/ encourage appropriate diets, oral nutritional supplements, and vitamin/mineral supplements  - Order, calculate, and assess calorie counts as needed  - Recommend, monitor, and adjust tube feedings and TPN/PPN based on assessed needs  - Assess need for intravenous fluids  - Provide specific nutrition/hydration education as appropriate  - Include patient/family/caregiver in decisions related to nutrition  Outcome: Adequate for Discharge     Problem: PAIN - ADULT  Goal: Verbalizes/displays adequate comfort level or baseline comfort level  Description: Interventions:  - Encourage patient to monitor pain and request assistance  - Assess pain using appropriate pain scale  - Administer analgesics based on type and severity of pain and evaluate response  - Implement non-pharmacological measures as appropriate and evaluate response  - Consider cultural and social influences on pain and pain management  - Notify physician/advanced practitioner if interventions unsuccessful or patient reports new pain  Outcome: Adequate for Discharge     Problem: INFECTION - ADULT  Goal: Absence or prevention of progression during hospitalization  Description: INTERVENTIONS:  - Assess and monitor for signs and symptoms of infection  - Monitor lab/diagnostic results  - Monitor all insertion sites, i e  indwelling lines, tubes, and drains  - Monitor endotracheal if appropriate and nasal secretions for changes in amount and color  - [place] appropriate cooling/warming therapies per order  - Administer medications as ordered  - Instruct and encourage patient and family to use good hand hygiene technique  - Identify and instruct in appropriate isolation precautions for identified infection/condition  Outcome: Adequate for Discharge     Problem: SAFETY ADULT  Goal: Patient will remain free of falls  Description: INTERVENTIONS:  - Assess patient frequently for physical needs  -  Identify cognitive and physical deficits and behaviors that affect risk of falls    -  [place] fall precautions as indicated by assessment   - Educate patient/family on patient safety including physical limitations  - Instruct patient to call for assistance with activity based on assessment  - Modify environment to reduce risk of injury  - Consider OT/PT consult to assist with strengthening/mobility  Outcome: Adequate for Discharge  Goal: Maintain or return to baseline ADL function  Description: INTERVENTIONS:  -  Assess patient's ability to carry out ADLs; assess patient's baseline for ADL function and identify physical deficits which impact ability to perform ADLs (bathing, care of mouth/teeth, toileting, grooming, dressing, etc )  - Assess/evaluate cause of self-care deficits   - Assess range of motion  - Assess patient's mobility; develop plan if impaired  - Assess patient's need for assistive devices and provide as appropriate  - Encourage maximum independence but intervene and supervise when necessary  - Involve family in performance of ADLs  - Assess for home care needs following discharge   - Consider OT consult to assist with ADL evaluation and planning for discharge  - Provide patient education as appropriate  Outcome: Adequate for Discharge  Goal: Maintain or return mobility status to optimal level  Description: INTERVENTIONS:  - Assess patient's baseline mobility status (ambulation, transfers, stairs, etc )    - Identify cognitive and physical deficits and behaviors that affect mobility  - Identify mobility aids required to assist with transfers and/or ambulation (gait belt, sit-to-stand, lift, walker, cane, etc )  - [place] fall precautions as indicated by assessment  - Record patient progress and toleration of activity level on Mobility SBAR; progress patient to next Phase/Stage  - Instruct patient to call for assistance with activity based on assessment  - Consider rehabilitation consult to assist with strengthening/weightbearing, etc   Outcome: Adequate for Discharge     Problem: DISCHARGE PLANNING  Goal: Discharge to home or other facility with appropriate resources  Description: INTERVENTIONS:  - Identify barriers to discharge w/patient and caregiver  - Arrange for needed discharge resources and transportation as appropriate  - Identify discharge learning needs (meds, wound care, etc )  - Arrange for interpretive services to assist at discharge as needed  - Refer to Case Management Department for coordinating discharge planning if the patient needs post-hospital services based on physician/advanced practitioner order or complex needs related to functional status, cognitive ability, or social support system  Outcome: Adequate for Discharge     Problem: Prexisting or High Potential for Compromised Skin Integrity  Goal: Skin integrity is maintained or improved  Description: INTERVENTIONS:  - Identify patients at risk for skin breakdown  - Assess and monitor skin integrity  - Assess and monitor nutrition and hydration status  - Monitor labs   - Assess for incontinence   - Turn and reposition patient  - Assist with mobility/ambulation  - Relieve pressure over bony prominences  - Avoid friction and shearing  - Provide appropriate hygiene as needed including keeping skin clean and dry  - Evaluate need for skin moisturizer/barrier cream  - Collaborate with interdisciplinary team   - Patient/family teaching  - Consider wound care consult   Outcome: Adequate for Discharge     Problem: Potential for Falls  Goal: Patient will remain free of falls  Description: INTERVENTIONS:  - Assess patient frequently for physical needs  -  Identify cognitive and physical deficits and behaviors that affect risk of falls    -  [place] fall precautions as indicated by assessment   - Educate patient/family on patient safety including physical limitations  - Instruct patient to call for assistance with activity based on assessment  - Modify environment to reduce risk of injury  - Consider OT/PT consult to assist with strengthening/mobility  Outcome: Adequate for Discharge

## 2021-06-07 NOTE — ADDENDUM NOTE
Addendum  created 06/07/21 1104 by Brissa Luis MD    Clinical Note Signed, Intraprocedure Blocks edited

## 2021-06-07 NOTE — UTILIZATION REVIEW
Notification of Discharge   This is a Notification of Discharge from our facility [address]  Please be advised that this patient has been discharge from our facility  Below you will find the admission and discharge date and time including the patients disposition  UTILIZATION REVIEW CONTACT:  Daphnie Canela  Utilization   Network Utilization Review Department  Phone: [phone] x carefully listen to the prompts  All voicemails are confidential   Email: [email or web address]     PHYSICIAN ADVISORY SERVICES:  FOR SAZK-LY-MQNZ REVIEW - MEDICAL NECESSITY DENIAL  Phone: [phone]  Fax: [phone]  Email: [email or web address]     PRESENTATION DATE: 5/26/2021  1:50 PM  OBERVATION ADMISSION DATE:   INPATIENT ADMISSION DATE: 5/27/21 0753   DISCHARGE DATE: 6/6/2021  2:00 PM  DISPOSITION: Home with [place] with [address] [place] [address] Road INFORMATION:  Send all requests for admission clinical reviews, approved or denied determinations and any other requests to dedicated fax number below belonging to the campus where the patient is receiving treatment   List of dedicated fax numbers:  [address] [place] [address] DENIALS (Administrative/Medical Necessity) [phone]   [address] [place] [address] (Maternity/NICU/Pediatrics) [phone]   [facility] [phone]   [facility] [phone]   Nelli Soto [phone]   [place] [address] [phone]   [facility]  [phone]   [address] [place] [address], [facility]  [address] [facility] [address] [facility] [phone]

## 2021-06-08 ENCOUNTER — TRANSITIONAL CARE MANAGEMENT (OUTPATIENT)
Dept: FAMILY MEDICINE CLINIC | Facility: CLINIC | Age: 41
End: 2021-06-08

## 2021-06-09 ENCOUNTER — TELEPHONE (OUTPATIENT)
Dept: SURGERY | Facility: CLINIC | Age: 41
End: 2021-06-09

## 2021-06-09 NOTE — TELEPHONE ENCOUNTER
Patient called with questions regarding returning to work and his Disability Paper work  He was explained that he needs to speak with his employer HR to send the paper work to our office  Fax number was given to patient

## 2021-06-16 ENCOUNTER — TELEPHONE (OUTPATIENT)
Dept: SURGERY | Facility: CLINIC | Age: 41
End: 2021-06-16

## 2021-06-16 NOTE — TELEPHONE ENCOUNTER
[facility] nurse called to report that Solomon Jarrell took last of ABT today and his leg is warm to touch and foot is swollen  Spoke to Dr Tracy Herrmann and he said nothing to do but keep the foot elevated, if patient should develop fever, or signs of infection go to ER  Patient has appt  On Monday

## 2021-06-21 ENCOUNTER — OFFICE VISIT (OUTPATIENT)
Dept: SURGERY | Facility: CLINIC | Age: 41
End: 2021-06-21

## 2021-06-21 VITALS — WEIGHT: 315 LBS | BODY MASS INDEX: 49.44 KG/M2 | TEMPERATURE: 98.7 F | HEIGHT: 67 IN

## 2021-06-21 DIAGNOSIS — L02.416 ABSCESS OF LEFT THIGH: Primary | ICD-10-CM

## 2021-06-21 PROCEDURE — 99024 POSTOP FOLLOW-UP VISIT: CPT | Performed by: SURGERY

## 2021-06-21 NOTE — H&P (VIEW-ONLY)
Office Visit - General Surgery  Karin Lang MRN: [id number]  Encounter: [id number]    Assessment and Plan    Problem List Items Addressed This Visit        Other    Abscess of left thigh - Primary     38 y/o M p/w LLE cellulitis, L thigh fluid collection, s/p bedside I&D on 5/26, OR I&D on 5/26, L thigh washout/closure on 5/28   --All wounds healing well, with improving cellulitis of L calf  --Sutures removed at bedside from L medial thigh  --Continue LLE compression with ACE wrap  --Continue L medial groin wound dressing with Alginate/ABD Pad/Tape- may require eventual STSG to site  --Close follow-up next week for another wound check               Chief Complaint:  Karin Lang is a 37 y o  male who presents for Wound Check (f/u hospital sepsis, wound on left leg, and groin wounc)    Subjective  Pt is a 38 y/o M p/w LLE cellulitis, L thigh fluid collection, s/p bedside I&D on 5/26, OR I&D on 5/26, L thigh washout/closure on 5/28  Pt returns today for LLE wound checks  Pt c/o increased edema in his L foot, but overall reports his wounds are healing well  Denies any fevers, chills, increased erythema/tenderness to the LLE       Past Medical History  Past Medical History:   Diagnosis Date    Coronary artery disease     [facility] mellitus [facility]     Heart disease     CAD   s/p ptca with 1 stent 2012    Hidradenitis suppurativa     groin    Hyperlipidemia     Hypertension     MI (myocardial infarction) ([facility]     " silent " M I  in the past    Morbid obesity with BMI of 50 0-59 9, adult ([facility] )     Myocardial infarction ([facility] )     Nicotine dependence     Obesity     Pilonidal cyst        Past Surgical History  Past Surgical History:   Procedure Laterality Date    CORONARY ANGIOPLASTY WITH STENT PLACEMENT      INCISION AND DRAINAGE OF WOUND N/A 1/24/2017    Procedure: INCISION AND DRAINAGE (I&D) BUTTOCK, PILONIDAL CYST;  Surgeon: Collin Gaspar MD;  Location: [address] [place] [address];  Service:    Kartik Novak LEG SURGERY Left I&D of left leg 2012    AK EXC SKIN BENIG >4 CM TRUNK,ARM,LEG Left 4/6/2021    Procedure: EXCISION THIGH MASS;  Surgeon: Pershing Kanner, MD;  [facility];  Service: General    AK NEG PRESS WOUND TX, < 50 CM Left 4/6/2021    Procedure: APPLICATION VAC DRESSING THIGH;  Surgeon: Pershing Kanner, MD;  [facility];  Service: General    WOUND DEBRIDEMENT Left 4/17/2021    Procedure: DEBRIDEMENT WOUND AND DRESSING CHANGE ([address] OUT); Surgeon: Judy Irving DO;  [facility];  Service: General    WOUND DEBRIDEMENT Left 4/22/2021    Procedure: DEBRIDEMENT LOWER EXTREMITY [facility] OUT), left groin and thigh;  Surgeon: Pershing Kanner, MD;  [facility];  Service: General    WOUND DEBRIDEMENT Left 5/26/2021    Procedure: DEBRIDEMENT LOWER EXTREMITY ([address] OUT); Surgeon:  Kerwin Baig DO;  [facility];  Service: General    WOUND DEBRIDEMENT Left 5/28/2021    Procedure: Washout left thigh wound and closure;  Surgeon: Michael Shirley DO;  [facility];  Service: General       Family History  Family History   Problem Relation Age of Onset    Heart disease Father        Social History  Social History     Socioeconomic History    Marital status: Single     Spouse name: None    Number of children: None    Years of education: None    Highest education level: None   Occupational History    None   Tobacco Use    Smoking status: Former Smoker     Packs/day: 1 00     Years: 20 00     Pack years: 20 00     Types: Cigarettes    Smokeless tobacco: Never Used   Vaping Use    Vaping Use: Never used   Substance and Sexual Activity    Alcohol use: Not Currently     Comment: rarely    Drug use: No    Sexual activity: Not Currently   Other Topics Concern    None   Social History Narrative    None     Social Determinants of Health     Financial Resource Strain:     Difficulty of Paying Living Expenses:    Food Insecurity:     Worried About Running Out of Food in the Last Year:     Ran Out of Food in the Last Year:    Transportation Needs:     Lack of Transportation (Medical):      Lack of Transportation (Non-Medical):    Physical Activity:     Days of Exercise per Week:     Minutes of Exercise per Session:    Stress:     Feeling of Stress :    Social Connections:     Frequency of Communication with Friends and Family:     Frequency of Social Gatherings with Friends and Family:     Attends Mandaen Services:     Active Member of Clubs or Organizations:     Attends Club or Organization Meetings:     Marital Status:    Intimate Partner Violence:     Fear of Current or Ex-Partner:     Emotionally Abused:     Physically Abused:     Sexually Abused:         Medications  Current Outpatient Medications on File Prior to Visit   Medication Sig Dispense Refill    acetaminophen (TYLENOL) 325 mg tablet Take 3 tablets (975 mg total) by mouth every 8 (eight) hours  0    amLODIPine (NORVASC) 10 mg tablet Take 1 tablet (10 mg total) by mouth daily 90 tablet 0    aspirin (ECOTRIN LOW STRENGTH) 81 mg EC tablet Take 1 tablet (81 mg total) by mouth daily 30 tablet 0    atorvastatin (LIPITOR) 80 mg tablet Take 1 tablet (80 mg total) by mouth daily 90 tablet 2    carvedilol (COREG) 6 25 mg tablet Take 1 tablet (6 25 mg total) by mouth 2 (two) times a day with meals 120 tablet 2    glimepiride (AMARYL) 4 mg tablet Take 1 tablet (4 mg total) by mouth daily with breakfast 90 tablet 1    lisinopril (ZESTRIL) 10 mg tablet Take 1 tablet (10 mg total) by mouth daily 90 tablet 1    metFORMIN (GLUCOPHAGE) 1000 MG tablet Take 1 tablet (1,000 mg total) by mouth 2 (two) times a day with meals 180 tablet 1    Omega-3 Fatty Acids (fish oil) 1,000 mg Take 1 capsule (1,000 mg total) by mouth 2 (two) times a day 180 capsule 2    Ticagrelor (BRILINTA PO) Take by mouth      gabapentin (NEURONTIN) 300 mg capsule Take 1 capsule (300 mg total) by mouth 3 (three) times a day for 14 days (Patient not taking: Reported on 5/10/2021) 42 capsule 0    methocarbamol (ROBAXIN) 750 mg tablet Take 1 tablet (750 mg total) by mouth every 6 (six) hours (Patient not taking: Reported on 5/10/2021) 30 tablet 0     No current facility-administered medications on file prior to visit  Allergies  Allergies   Allergen Reactions    Amoxicillin Hives       Review of Systems   Constitutional: Negative for activity change, appetite change, chills and fever  HENT: Negative for congestion, sinus pressure and sinus pain  Respiratory: Negative for apnea, cough, chest tightness, shortness of breath and wheezing  Cardiovascular: Negative for chest pain, palpitations and leg swelling  Gastrointestinal: Negative for abdominal distention, abdominal pain, constipation, diarrhea, nausea and vomiting  Genitourinary: Negative for difficulty urinating and dysuria  Musculoskeletal: Negative for arthralgias, back pain and gait problem  Skin: Positive for wound  Negative for color change, pallor and rash  Chronic L medial groin wound  L medial thigh incisions x2   Neurological: Negative for dizziness, tremors, seizures, syncope, facial asymmetry and numbness  Psychiatric/Behavioral: Negative for agitation, behavioral problems and confusion  Objective  Vitals:    06/21/21 1134   Temp: 98 7 °F (37 1 °C)       Physical Exam  Constitutional:       General: He is not in acute distress  Appearance: He is well-developed  He is not diaphoretic  HENT:      Head: Normocephalic and atraumatic  Cardiovascular:      Rate and Rhythm: Normal rate and regular rhythm  Heart sounds: Normal heart sounds  No murmur heard  No friction rub  No gallop  Pulmonary:      Effort: Pulmonary effort is normal  No respiratory distress  Breath sounds: Normal breath sounds  No wheezing or rales  Abdominal:      General: Bowel sounds are normal  There is no distension  Palpations: Abdomen is soft  Tenderness:  There is no abdominal tenderness  Musculoskeletal:         General: Normal range of motion  Cervical back: Normal range of motion and neck supple  Skin:     General: Skin is warm and dry  Findings: No erythema  Comments: L medial groin wound c/d/i with healthy granulation tissue at base  L medial thigh incisions c/d/i with Nylon sutures in place  Improving L calf erythema  Mild L foot edema   Neurological:      Mental Status: He is alert and oriented to person, place, and time  Posterior L calf:       Anterior L calf:      L medial groin wound/incisions:      R groin wound:

## 2021-06-21 NOTE — PROGRESS NOTES
Office Visit - General Surgery  Jorden Suazo MRN: [id number]  Encounter: [id number]    Assessment and Plan    Problem List Items Addressed This Visit        Other    Abscess of left thigh - Primary     36 y/o M p/w LLE cellulitis, L thigh fluid collection, s/p bedside I&D on 5/26, OR I&D on 5/26, L thigh washout/closure on 5/28   --All wounds healing well, with improving cellulitis of L calf  --Sutures removed at bedside from L medial thigh  --Continue LLE compression with ACE wrap  --Continue L medial groin wound dressing with Alginate/ABD Pad/Tape- may require eventual STSG to site  --Close follow-up next week for another wound check               Chief Complaint:  Jorden Suazo is a 37 y o  male who presents for Wound Check (f/u hospital sepsis, wound on left leg, and groin wounc)    Subjective  Pt is a 36 y/o M p/w LLE cellulitis, L thigh fluid collection, s/p bedside I&D on 5/26, OR I&D on 5/26, L thigh washout/closure on 5/28  Pt returns today for LLE wound checks  Pt c/o increased edema in his L foot, but overall reports his wounds are healing well  Denies any fevers, chills, increased erythema/tenderness to the LLE       Past Medical History  Past Medical History:   Diagnosis Date    Coronary artery disease     [facility] mellitus [facility] [address]     Heart disease     CAD   s/p ptca with 1 stent 2012    Hidradenitis suppurativa     groin    Hyperlipidemia     Hypertension     MI [facility]myocardial infarction) [facility] [address] [facility]     " silent " M I  in the past    Morbid obesity with BMI of 50 0-59 9, adult ([facility] [address] [facility]     Myocardial infarction [facility] [address] [facility]     Nicotine dependence     Obesity     Pilonidal cyst        Past Surgical History  Past Surgical History:   Procedure Laterality Date    CORONARY ANGIOPLASTY WITH STENT PLACEMENT      INCISION AND DRAINAGE OF WOUND N/A 1/24/2017    Procedure: INCISION AND DRAINAGE (I&D) BUTTOCK, PILONIDAL CYST;  Surgeon: Olga Norris MD;  Location: [address] [place] [address];  Service:    Andrew Zaidi LEG SURGERY Left I&D of left leg 2012    [place] EXC SKIN BENIG >4 CM TRUNK,ARM,LEG Left 4/6/2021    Procedure: EXCISION THIGH MASS;  Surgeon: Jonathan Edwards MD;  [facility];  Service: General    W[place] NEG PRESS WOUND TX, < 50 CM Left 4/6/2021    Procedure: APPLICATION VAC DRESSING THIGH;  Surgeon: Jonathan Edwards MD;  [facility];  Service: General    WOUND DEBRIDEMENT Left 4/17/2021    Procedure: DEBRIDEMENT WOUND AND DRESSING CHANGE ([address] OUT); Surgeon: Parth Abreu DO;  [facility];  Service: General    WOUND DEBRIDEMENT Left 4/22/2021    Procedure: DEBRIDEMENT LOWER EXTREMITY [facility] OUT), left groin and thigh;  Surgeon: Jonathan Edwards MD;  [facility];  Service: General    WOUND DEBRIDEMENT Left 5/26/2021    Procedure: DEBRIDEMENT LOWER EXTREMITY ([address] OUT); Surgeon:  Elvira Fernandez DO;  [facility];  Service: General    WOUND DEBRIDEMENT Left 5/28/2021    Procedure: Washout left thigh wound and closure;  Surgeon: Sydnie Ramos DO;  [facility];  Service: General       Family History  Family History   Problem Relation Age of Onset    Heart disease Father        Social History  Social History     Socioeconomic History    Marital status: Single     Spouse name: None    Number of children: None    Years of education: None    Highest education level: None   Occupational History    None   Tobacco Use    Smoking status: Former Smoker     Packs/day: 1 00     Years: 20 00     Pack years: 20 00     Types: Cigarettes    Smokeless tobacco: Never Used   Vaping Use    Vaping Use: Never used   Substance and Sexual Activity    Alcohol use: Not Currently     Comment: rarely    Drug use: No    Sexual activity: Not Currently   Other Topics Concern    None   Social History Narrative    None     Social Determinants of Health     Financial Resource Strain:     Difficulty of Paying Living Expenses:    Food Insecurity:     Worried About Running Out of Food in the Last Year:     Ran Out of Food in the Last Year:    Transportation Needs:     Lack of Transportation (Medical):      Lack of Transportation (Non-Medical):    Physical Activity:     Days of Exercise per Week:     Minutes of Exercise per Session:    Stress:     Feeling of Stress :    Social Connections:     Frequency of Communication with Friends and Family:     Frequency of Social Gatherings with Friends and Family:     Attends Christianity Services:     Active Member of Clubs or Organizations:     Attends Club or Organization Meetings:     Marital Status:    Intimate Partner Violence:     Fear of Current or Ex-Partner:     Emotionally Abused:     Physically Abused:     Sexually Abused:         Medications  Current Outpatient Medications on File Prior to Visit   Medication Sig Dispense Refill    acetaminophen (TYLENOL) 325 mg tablet Take 3 tablets (975 mg total) by mouth every 8 (eight) hours  0    amLODIPine (NORVASC) 10 mg tablet Take 1 tablet (10 mg total) by mouth daily 90 tablet 0    aspirin (ECOTRIN LOW STRENGTH) 81 mg EC tablet Take 1 tablet (81 mg total) by mouth daily 30 tablet 0    atorvastatin (LIPITOR) 80 mg tablet Take 1 tablet (80 mg total) by mouth daily 90 tablet 2    carvedilol (COREG) 6 25 mg tablet Take 1 tablet (6 25 mg total) by mouth 2 (two) times a day with meals 120 tablet 2    glimepiride (AMARYL) 4 mg tablet Take 1 tablet (4 mg total) by mouth daily with breakfast 90 tablet 1    lisinopril (ZESTRIL) 10 mg tablet Take 1 tablet (10 mg total) by mouth daily 90 tablet 1    metFORMIN (GLUCOPHAGE) 1000 MG tablet Take 1 tablet (1,000 mg total) by mouth 2 (two) times a day with meals 180 tablet 1    Omega-3 Fatty Acids (fish oil) 1,000 mg Take 1 capsule (1,000 mg total) by mouth 2 (two) times a day 180 capsule 2    Ticagrelor (BRILINTA PO) Take by mouth      gabapentin (NEURONTIN) 300 mg capsule Take 1 capsule (300 mg total) by mouth 3 (three) times a day for 14 days (Patient not taking: Reported on 5/10/2021) 42 capsule 0    methocarbamol (ROBAXIN) 750 mg tablet Take 1 tablet (750 mg total) by mouth every 6 (six) hours (Patient not taking: Reported on 5/10/2021) 30 tablet 0     No current facility-administered medications on file prior to visit  Allergies  Allergies   Allergen Reactions    Amoxicillin Hives       Review of Systems   Constitutional: Negative for activity change, appetite change, chills and fever  HENT: Negative for congestion, sinus pressure and sinus pain  Respiratory: Negative for apnea, cough, chest tightness, shortness of breath and wheezing  Cardiovascular: Negative for chest pain, palpitations and leg swelling  Gastrointestinal: Negative for abdominal distention, abdominal pain, constipation, diarrhea, nausea and vomiting  Genitourinary: Negative for difficulty urinating and dysuria  Musculoskeletal: Negative for arthralgias, back pain and gait problem  Skin: Positive for wound  Negative for color change, pallor and rash  Chronic L medial groin wound  L medial thigh incisions x2   Neurological: Negative for dizziness, tremors, seizures, syncope, facial asymmetry and numbness  Psychiatric/Behavioral: Negative for agitation, behavioral problems and confusion  Objective  Vitals:    06/21/21 1134   Temp: 98 7 °F (37 1 °C)       Physical Exam  Constitutional:       General: He is not in acute distress  Appearance: He is well-developed  He is not diaphoretic  HENT:      Head: Normocephalic and atraumatic  Cardiovascular:      Rate and Rhythm: Normal rate and regular rhythm  Heart sounds: Normal heart sounds  No murmur heard  No friction rub  No gallop  Pulmonary:      Effort: Pulmonary effort is normal  No respiratory distress  Breath sounds: Normal breath sounds  No wheezing or rales  Abdominal:      General: Bowel sounds are normal  There is no distension  Palpations: Abdomen is soft  Tenderness:  There is no abdominal tenderness  Musculoskeletal:         General: Normal range of motion  Cervical back: Normal range of motion and neck supple  Skin:     General: Skin is warm and dry  Findings: No erythema  Comments: L medial groin wound c/d/i with healthy granulation tissue at base  L medial thigh incisions c/d/i with Nylon sutures in place  Improving L calf erythema  Mild L foot edema   Neurological:      Mental Status: He is alert and oriented to person, place, and time  Posterior L calf:       Anterior L calf:      L medial groin wound/incisions:      R groin wound:

## 2021-06-21 NOTE — LETTER
June 21, 2021     Patient: Homero Mack   YOB: 1980   Date of Visit: 6/21/2021       To Whom it May Concern:    Adriana Koroma is under my professional care  He was seen in my office on 6/21/2021  He may return to work on 7/26/21 or sooner if he feels well       If you have any questions or concerns, please don't hesitate to call           Sincerely,          [facility]        CC: No Recipients

## 2021-06-21 NOTE — ASSESSMENT & PLAN NOTE
36 y/o M p/w LLE cellulitis, L thigh fluid collection, s/p bedside I&D on 5/26, OR I&D on 5/26, L thigh washout/closure on 5/28   --All wounds healing well, with improving cellulitis of L calf  --Sutures removed at bedside from L medial thigh  --Continue LLE compression with ACE wrap  --Continue L medial groin wound dressing with Alginate/ABD Pad/Tape- may require eventual STSG to site  --Close follow-up next week for another wound check

## 2021-06-23 ENCOUNTER — OFFICE VISIT (OUTPATIENT)
Dept: SURGERY | Facility: CLINIC | Age: 41
End: 2021-06-23
Payer: COMMERCIAL

## 2021-06-23 VITALS — HEIGHT: 67 IN | TEMPERATURE: 97.8 F | BODY MASS INDEX: 49.44 KG/M2 | WEIGHT: 315 LBS

## 2021-06-23 DIAGNOSIS — S71.102D OPEN THIGH WOUND, LEFT, SUBSEQUENT ENCOUNTER: ICD-10-CM

## 2021-06-23 DIAGNOSIS — L03.116 CELLULITIS OF LEFT LOWER EXTREMITY: ICD-10-CM

## 2021-06-23 DIAGNOSIS — L02.416 ABSCESS OF LEFT THIGH: Primary | ICD-10-CM

## 2021-06-23 LAB
DME PARACHUTE DELIVERY DATE ACTUAL: NORMAL
DME PARACHUTE DELIVERY DATE REQUESTED: NORMAL
DME PARACHUTE ITEM DESCRIPTION: NORMAL
DME PARACHUTE ITEM DESCRIPTION: NORMAL
DME PARACHUTE ORDER STATUS: NORMAL
DME PARACHUTE SUPPLIER NAME: NORMAL
DME PARACHUTE SUPPLIER PHONE: NORMAL

## 2021-06-23 PROCEDURE — 1036F TOBACCO NON-USER: CPT | Performed by: SURGERY

## 2021-06-23 PROCEDURE — 99213 OFFICE O/P EST LOW 20 MIN: CPT | Performed by: SURGERY

## 2021-06-23 NOTE — PROGRESS NOTES
Office Visit - General Surgery  Army Maki MRN: [id number]  Encounter: [id number]    Assessment and Plan    Problem List Items Addressed This Visit        Other    Abscess of left thigh - Primary     - Left thigh abscess appreciated on evaluation today  - Will plan for operative procedure next Wednesday with the general surgery team under local anesthesia with procedural sedation  - consent and case request placed  - patient made aware the risks benefits and alternatives  - local wound care provided today on evaluation  - no further antibiotics at this time  - abscess currently draining         Cellulitis of left lower extremity     - cellulitis of left lower extremity that is resolving  - lymphedema noted on evaluation  - discussed with attending no further antibiotics at this time  - patient completed course of antibiotics  - will continue with Ace wrap compression  - follow-up next week         Open thigh wound, left, subsequent encounter     -  Local wound care provided today on evaluation  - patient wrapped with Maxorb as well as ABD Kerlix  - patient tolerated well with no immediate complications  - No Phoneix was placed  -  Will follow-up next week  - continue local wound care as indicated                     Disposition:  Follow-up next week on Wednesday for operative fixation of the left groin abscess as well as debridement of left thigh wound    Chief Complaint:  Army Maki is a 37 y o  male who presents for Wound Check (groin wound check)    Subjective   patient offering minimal complaints  Reports he is feeling better  Denies any new fevers, chills, sweats  Reports here cellulitis is improving on his left lower extremity  Denies any new numbness or tingling  No ambulatory dysfunction  No new drainage from the wound      Past Medical History  Past Medical History:   Diagnosis Date    Coronary artery disease     Diabetes mellitus ([facility] [address] [facility]     Heart disease     CAD   s/p ptca with 1 stent 2012    Hidradenitis suppurativa     groin    Hyperlipidemia     Hypertension     [facility]) [facility] [address] [facility]     " silent " M I  in the past    Morbid obesity with BMI of 50 0-59 9, adult (HCC)     Myocardial infarction ([facility] [address] )     Nicotine dependence     Obesity     Pilonidal cyst        Past Surgical History  Past Surgical History:   Procedure Laterality Date    CORONARY ANGIOPLASTY WITH STENT PLACEMENT      INCISION AND DRAINAGE OF WOUND N/A 1/24/2017    Procedure: INCISION AND DRAINAGE (I&D) BUTTOCK, PILONIDAL CYST;  Surgeon: Natacha Garcia MD;  Location: [address] [place] [address];  Service:    Shanta Mare LEG SURGERY Left     I&D of left leg 2012    [place] EXC SKIN BENIG >4 CM TRUNK,ARM,LEG Left 4/6/2021    Procedure: EXCISION THIGH MASS;  Surgeon: Jonas Santos MD;  [facility];  Service: General    [place] NEG PRESS WOUND TX, < 50 CM Left 4/6/2021    Procedure: APPLICATION VAC DRESSING THIGH;  Surgeon: Jonas Santos MD;  [facility];  Service: General    WOUND DEBRIDEMENT Left 4/17/2021    Procedure: DEBRIDEMENT WOUND AND DRESSING CHANGE ([address] OUT); Surgeon: Annabelle Olivo DO;  [facility];  Service: General    WOUND DEBRIDEMENT Left 4/22/2021    Procedure: DEBRIDEMENT LOWER EXTREMITY [facility] OUT), left groin and thigh;  Surgeon: Jonas Santos MD;  [facility];  Service: General    WOUND DEBRIDEMENT Left 5/26/2021    Procedure: DEBRIDEMENT LOWER EXTREMITY ([address] OUT); Surgeon:  Radha Finn DO;  [facility];  Service: General    WOUND DEBRIDEMENT Left 5/28/2021    Procedure: Washout left thigh wound and closure;  Surgeon: Abram Villa DO;  [facility];  Service: General       Family History  Family History   Problem Relation Age of Onset    Heart disease Father        Social History  Social History     Socioeconomic History    Marital status: Single     Spouse name: None    Number of children: None    Years of education: None    Highest education level: None Occupational History    None   Tobacco Use    Smoking status: Former Smoker     Packs/day: 1 00     Years: 20 00     Pack years: 20 00     Types: Cigarettes    Smokeless tobacco: Never Used   Vaping Use    Vaping Use: Never used   Substance and Sexual Activity    Alcohol use: Not Currently     Comment: rarely    Drug use: No    Sexual activity: Not Currently   Other Topics Concern    None   Social History Narrative    None     Social Determinants of Health     Financial Resource Strain:     Difficulty of Paying Living Expenses:    Food Insecurity:     Worried About Running Out of Food in the Last Year:     Ran Out of Food in the Last Year:    Transportation Needs:     Lack of Transportation (Medical):      Lack of Transportation (Non-Medical):    Physical Activity:     Days of Exercise per Week:     Minutes of Exercise per Session:    Stress:     Feeling of Stress :    Social Connections:     Frequency of Communication with Friends and Family:     Frequency of Social Gatherings with Friends and Family:     Attends Jewish Services:     Active Member of Clubs or Organizations:     Attends Club or Organization Meetings:     Marital Status:    Intimate Partner Violence:     Fear of Current or Ex-Partner:     Emotionally Abused:     Physically Abused:     Sexually Abused:         Medications  Current Outpatient Medications on File Prior to Visit   Medication Sig Dispense Refill    acetaminophen (TYLENOL) 325 mg tablet Take 3 tablets (975 mg total) by mouth every 8 (eight) hours  0    amLODIPine (NORVASC) 10 mg tablet Take 1 tablet (10 mg total) by mouth daily 90 tablet 0    aspirin (ECOTRIN LOW STRENGTH) 81 mg EC tablet Take 1 tablet (81 mg total) by mouth daily 30 tablet 0    atorvastatin (LIPITOR) 80 mg tablet Take 1 tablet (80 mg total) by mouth daily 90 tablet 2    carvedilol (COREG) 6 25 mg tablet Take 1 tablet (6 25 mg total) by mouth 2 (two) times a day with meals 120 tablet 2  glimepiride (AMARYL) 4 mg tablet Take 1 tablet (4 mg total) by mouth daily with breakfast 90 tablet 1    lisinopril (ZESTRIL) 10 mg tablet Take 1 tablet (10 mg total) by mouth daily 90 tablet 1    metFORMIN (GLUCOPHAGE) 1000 MG tablet Take 1 tablet (1,000 mg total) by mouth 2 (two) times a day with meals 180 tablet 1    methocarbamol (ROBAXIN) 750 mg tablet Take 1 tablet (750 mg total) by mouth every 6 (six) hours 30 tablet 0    Omega-3 Fatty Acids (fish oil) 1,000 mg Take 1 capsule (1,000 mg total) by mouth 2 (two) times a day 180 capsule 2    Ticagrelor (BRILINTA PO) Take by mouth      gabapentin (NEURONTIN) 300 mg capsule Take 1 capsule (300 mg total) by mouth 3 (three) times a day for 14 days (Patient not taking: Reported on 5/10/2021) 42 capsule 0     No current facility-administered medications on file prior to visit  Allergies  Allergies   Allergen Reactions    Amoxicillin Hives       Review of Systems   Constitutional: Negative for activity change, appetite change and fever  HENT: Negative for ear discharge, ear pain, rhinorrhea, sore throat and trouble swallowing  Eyes: Negative for photophobia, pain and redness  Respiratory: Negative for apnea, cough, chest tightness, shortness of breath and stridor  Cardiovascular: Negative for chest pain and palpitations  Gastrointestinal: Negative for abdominal distention, abdominal pain, nausea and vomiting  Endocrine: Negative for cold intolerance and heat intolerance  Genitourinary: Negative  Musculoskeletal: Negative for arthralgias, back pain, neck pain and neck stiffness  Skin: Positive for wound  Neurological: Negative for dizziness, weakness, light-headedness and numbness  Hematological: Negative  Objective  Vitals:    06/23/21 1118   Temp: 97 8 °F (36 6 °C)       Physical Exam  Vitals reviewed  Constitutional:       Appearance: Normal appearance  HENT:      Head: Normocephalic and atraumatic  Cardiovascular:      Rate and Rhythm: Normal rate and regular rhythm  Pulses: Normal pulses  Heart sounds: Normal heart sounds  Pulmonary:      Effort: Pulmonary effort is normal       Breath sounds: Normal breath sounds  Musculoskeletal:      Cervical back: Normal range of motion and neck supple  Comments: Left medial thigh wound is clean, dry, intact there is minimal drainage coming from the wound  Area of abscess in the left inguinal canal that appears to be where the area of purulence coming from  The abscess is draining  No packing indicated  Will continue to monitor  Neurological:      Mental Status: He is alert

## 2021-06-23 NOTE — ASSESSMENT & PLAN NOTE
- cellulitis of left lower extremity that is resolving  - lymphedema noted on evaluation  - discussed with attending no further antibiotics at this time  - patient completed course of antibiotics  - will continue with Ace wrap compression  - follow-up next week

## 2021-06-23 NOTE — ASSESSMENT & PLAN NOTE
- Left thigh abscess appreciated on evaluation today  - Will plan for operative procedure next Wednesday with the general surgery team under local anesthesia with procedural sedation  - consent and case request placed  - patient made aware the risks benefits and alternatives  - local wound care provided today on evaluation  - no further antibiotics at this time  - abscess currently draining

## 2021-06-23 NOTE — ASSESSMENT & PLAN NOTE
-  Local wound care provided today on evaluation  - patient wrapped with Maxorb as well as ABD Kerlix  - patient tolerated well with no immediate complications  - No Phoneix was placed  -  Will follow-up next week  - continue local wound care as indicated

## 2021-06-29 ENCOUNTER — ANESTHESIA EVENT (OUTPATIENT)
Dept: PERIOP | Facility: HOSPITAL | Age: 41
End: 2021-06-29
Payer: COMMERCIAL

## 2021-06-29 NOTE — PRE-PROCEDURE INSTRUCTIONS
Pre-Surgery Instructions:   Medication Instructions    acetaminophen (TYLENOL) 325 mg tablet PRN    amLODIPine (NORVASC) 10 mg tablet ok take morning day of surgery with sips of water    aspirin (ECOTRIN LOW STRENGTH) 81 mg EC tablet message send to anesthesia    atorvastatin (LIPITOR) 80 mg tablet take at HS    carvedilol (COREG) 6 25 mg tablet ok take morning day of surgery with sips of water    glimepiride (AMARYL) 4 mg tablet don't take morning day of surgery    lisinopril (ZESTRIL) 10 mg tablet don't take morning day of surgery    metFORMIN (GLUCOPHAGE) 1000 MG tablet don't take morning day of surgery    Omega-3 Fatty Acids (fish oil) 1,000 mg stop    Ticagrelor (BRILINTA PO) message send to anesthesia   Have you had / have a sore throat? No  Have you had / have a cough less than 1 week? No  Have you had / have a fever greater than 100 0 - 100  4? No  Are you experiencing any shortness of breath? Nop    Review with patient via phone medications and showering instructions  Advised don't take NSAID's, ok tylenol products  Advised ASC call with surgery schedule time, nothing eat or drink after midnight  Verbalized understanding  Advise Refer to Weight Management Program and is not interested

## 2021-06-30 ENCOUNTER — ANESTHESIA (OUTPATIENT)
Dept: PERIOP | Facility: HOSPITAL | Age: 41
End: 2021-06-30
Payer: COMMERCIAL

## 2021-07-02 ENCOUNTER — TELEPHONE (OUTPATIENT)
Dept: SURGERY | Facility: CLINIC | Age: 41
End: 2021-07-02

## 2021-07-02 NOTE — TELEPHONE ENCOUNTER
[facility] called inquiring about patient holding his blood thinners and faxing consent to their department  Per Dr Voncile Ganser he does not need to hold his blood thinners and gave this information to [facility]

## 2021-07-06 ENCOUNTER — HOSPITAL ENCOUNTER (OUTPATIENT)
Facility: HOSPITAL | Age: 41
Setting detail: OUTPATIENT SURGERY
Discharge: HOME HEALTH | End: 2021-07-06
Attending: SURGERY | Admitting: SURGERY
Payer: COMMERCIAL

## 2021-07-06 VITALS
RESPIRATION RATE: 18 BRPM | OXYGEN SATURATION: 96 % | BODY MASS INDEX: 49.44 KG/M2 | WEIGHT: 315 LBS | SYSTOLIC BLOOD PRESSURE: 150 MMHG | HEART RATE: 75 BPM | HEIGHT: 67 IN | DIASTOLIC BLOOD PRESSURE: 84 MMHG | TEMPERATURE: 97.7 F

## 2021-07-06 DIAGNOSIS — L02.416 ABSCESS OF LEFT THIGH: Primary | ICD-10-CM

## 2021-07-06 DIAGNOSIS — L02.215 PERINEAL ABSCESS: ICD-10-CM

## 2021-07-06 LAB
GLUCOSE SERPL-MCNC: 107 MG/DL (ref 65–140)
GLUCOSE SERPL-MCNC: 150 MG/DL (ref 65–140)

## 2021-07-06 PROCEDURE — 88304 TISSUE EXAM BY PATHOLOGIST: CPT | Performed by: PATHOLOGY

## 2021-07-06 PROCEDURE — 11042 DBRDMT SUBQ TIS 1ST 20SQCM/<: CPT | Performed by: SURGERY

## 2021-07-06 PROCEDURE — 82948 REAGENT STRIP/BLOOD GLUCOSE: CPT

## 2021-07-06 PROCEDURE — NC001 PR NO CHARGE: Performed by: SURGERY

## 2021-07-06 PROCEDURE — 11045 DBRDMT SUBQ TISS EACH ADDL: CPT | Performed by: SURGERY

## 2021-07-06 RX ORDER — OXYCODONE HYDROCHLORIDE 5 MG/1
5 TABLET ORAL EVERY 4 HOURS PRN
Qty: 30 TABLET | Refills: 0 | Status: SHIPPED | OUTPATIENT
Start: 2021-07-06 | End: 2021-07-14

## 2021-07-06 RX ORDER — MIDAZOLAM HYDROCHLORIDE 2 MG/2ML
INJECTION, SOLUTION INTRAMUSCULAR; INTRAVENOUS AS NEEDED
Status: DISCONTINUED | OUTPATIENT
Start: 2021-07-06 | End: 2021-07-06

## 2021-07-06 RX ORDER — BUPIVACAINE HYDROCHLORIDE 5 MG/ML
INJECTION, SOLUTION PERINEURAL AS NEEDED
Status: DISCONTINUED | OUTPATIENT
Start: 2021-07-06 | End: 2021-07-06 | Stop reason: HOSPADM

## 2021-07-06 RX ORDER — SODIUM CHLORIDE, SODIUM LACTATE, POTASSIUM CHLORIDE, CALCIUM CHLORIDE 600; 310; 30; 20 MG/100ML; MG/100ML; MG/100ML; MG/100ML
100 INJECTION, SOLUTION INTRAVENOUS CONTINUOUS
Status: DISCONTINUED | OUTPATIENT
Start: 2021-07-06 | End: 2021-07-06 | Stop reason: HOSPADM

## 2021-07-06 RX ORDER — ONDANSETRON 2 MG/ML
4 INJECTION INTRAMUSCULAR; INTRAVENOUS ONCE AS NEEDED
Status: DISCONTINUED | OUTPATIENT
Start: 2021-07-06 | End: 2021-07-06 | Stop reason: HOSPADM

## 2021-07-06 RX ORDER — HYDROMORPHONE HCL/PF 1 MG/ML
0.5 SYRINGE (ML) INJECTION
Status: COMPLETED | OUTPATIENT
Start: 2021-07-06 | End: 2021-07-06

## 2021-07-06 RX ORDER — ONDANSETRON 2 MG/ML
INJECTION INTRAMUSCULAR; INTRAVENOUS AS NEEDED
Status: DISCONTINUED | OUTPATIENT
Start: 2021-07-06 | End: 2021-07-06

## 2021-07-06 RX ORDER — FENTANYL CITRATE/PF 50 MCG/ML
25 SYRINGE (ML) INJECTION
Status: COMPLETED | OUTPATIENT
Start: 2021-07-06 | End: 2021-07-06

## 2021-07-06 RX ORDER — ALBUTEROL SULFATE 90 UG/1
AEROSOL, METERED RESPIRATORY (INHALATION) AS NEEDED
Status: DISCONTINUED | OUTPATIENT
Start: 2021-07-06 | End: 2021-07-06

## 2021-07-06 RX ORDER — SODIUM CHLORIDE, SODIUM LACTATE, POTASSIUM CHLORIDE, CALCIUM CHLORIDE 600; 310; 30; 20 MG/100ML; MG/100ML; MG/100ML; MG/100ML
INJECTION, SOLUTION INTRAVENOUS CONTINUOUS PRN
Status: DISCONTINUED | OUTPATIENT
Start: 2021-07-06 | End: 2021-07-06

## 2021-07-06 RX ORDER — PROPOFOL 10 MG/ML
INJECTION, EMULSION INTRAVENOUS CONTINUOUS PRN
Status: DISCONTINUED | OUTPATIENT
Start: 2021-07-06 | End: 2021-07-06

## 2021-07-06 RX ORDER — FENTANYL CITRATE 50 UG/ML
INJECTION, SOLUTION INTRAMUSCULAR; INTRAVENOUS AS NEEDED
Status: DISCONTINUED | OUTPATIENT
Start: 2021-07-06 | End: 2021-07-06

## 2021-07-06 RX ORDER — SODIUM CHLORIDE, SODIUM LACTATE, POTASSIUM CHLORIDE, CALCIUM CHLORIDE 600; 310; 30; 20 MG/100ML; MG/100ML; MG/100ML; MG/100ML
125 INJECTION, SOLUTION INTRAVENOUS CONTINUOUS
Status: DISCONTINUED | OUTPATIENT
Start: 2021-07-06 | End: 2021-07-06 | Stop reason: HOSPADM

## 2021-07-06 RX ORDER — DEXMEDETOMIDINE HYDROCHLORIDE 100 UG/ML
INJECTION, SOLUTION INTRAVENOUS AS NEEDED
Status: DISCONTINUED | OUTPATIENT
Start: 2021-07-06 | End: 2021-07-06

## 2021-07-06 RX ORDER — GLYCOPYRROLATE 0.2 MG/ML
INJECTION INTRAMUSCULAR; INTRAVENOUS AS NEEDED
Status: DISCONTINUED | OUTPATIENT
Start: 2021-07-06 | End: 2021-07-06

## 2021-07-06 RX ORDER — OXYCODONE HYDROCHLORIDE 5 MG/1
5 TABLET ORAL ONCE
Status: COMPLETED | OUTPATIENT
Start: 2021-07-06 | End: 2021-07-06

## 2021-07-06 RX ORDER — KETAMINE HYDROCHLORIDE 50 MG/ML
INJECTION, SOLUTION, CONCENTRATE INTRAMUSCULAR; INTRAVENOUS AS NEEDED
Status: DISCONTINUED | OUTPATIENT
Start: 2021-07-06 | End: 2021-07-06

## 2021-07-06 RX ADMIN — HYDROMORPHONE HYDROCHLORIDE 0.5 MG: 1 INJECTION, SOLUTION INTRAMUSCULAR; INTRAVENOUS; SUBCUTANEOUS at 17:55

## 2021-07-06 RX ADMIN — PROPOFOL 70 MCG/KG/MIN: 10 INJECTION, EMULSION INTRAVENOUS at 16:10

## 2021-07-06 RX ADMIN — MIDAZOLAM 1 MG: 1 INJECTION INTRAMUSCULAR; INTRAVENOUS at 15:51

## 2021-07-06 RX ADMIN — OXYCODONE HYDROCHLORIDE 5 MG: 5 TABLET ORAL at 19:21

## 2021-07-06 RX ADMIN — DEXMEDETOMIDINE HCL 8 MCG: 100 INJECTION INTRAVENOUS at 15:59

## 2021-07-06 RX ADMIN — Medication 25 MCG: at 17:24

## 2021-07-06 RX ADMIN — DEXMEDETOMIDINE HCL 4 MCG: 100 INJECTION INTRAVENOUS at 16:04

## 2021-07-06 RX ADMIN — HYDROMORPHONE HYDROCHLORIDE 0.5 MG: 1 INJECTION, SOLUTION INTRAMUSCULAR; INTRAVENOUS; SUBCUTANEOUS at 17:48

## 2021-07-06 RX ADMIN — KETAMINE HYDROCHLORIDE 10 MG: 50 INJECTION, SOLUTION INTRAMUSCULAR; INTRAVENOUS at 16:07

## 2021-07-06 RX ADMIN — GLYCOPYRROLATE 0.1 MG: 0.2 INJECTION, SOLUTION INTRAMUSCULAR; INTRAVENOUS at 15:54

## 2021-07-06 RX ADMIN — FENTANYL CITRATE 25 MCG: 50 INJECTION INTRAMUSCULAR; INTRAVENOUS at 16:09

## 2021-07-06 RX ADMIN — FENTANYL CITRATE 25 MCG: 50 INJECTION INTRAMUSCULAR; INTRAVENOUS at 16:32

## 2021-07-06 RX ADMIN — ONDANSETRON 4 MG: 2 INJECTION INTRAMUSCULAR; INTRAVENOUS at 16:10

## 2021-07-06 RX ADMIN — FENTANYL CITRATE 25 MCG: 50 INJECTION INTRAMUSCULAR; INTRAVENOUS at 16:16

## 2021-07-06 RX ADMIN — DEXMEDETOMIDINE HCL 4 MCG: 100 INJECTION INTRAVENOUS at 16:02

## 2021-07-06 RX ADMIN — Medication 25 MCG: at 17:33

## 2021-07-06 RX ADMIN — SODIUM CHLORIDE, SODIUM LACTATE, POTASSIUM CHLORIDE, AND CALCIUM CHLORIDE 125 ML/HR: .6; .31; .03; .02 INJECTION, SOLUTION INTRAVENOUS at 17:50

## 2021-07-06 RX ADMIN — ALBUTEROL SULFATE 2 PUFF: 90 AEROSOL, METERED RESPIRATORY (INHALATION) at 15:38

## 2021-07-06 RX ADMIN — Medication 25 MCG: at 17:39

## 2021-07-06 RX ADMIN — DEXMEDETOMIDINE HCL 4 MCG: 100 INJECTION INTRAVENOUS at 16:15

## 2021-07-06 RX ADMIN — SODIUM CHLORIDE, SODIUM LACTATE, POTASSIUM CHLORIDE, AND CALCIUM CHLORIDE 125 ML/HR: .6; .31; .03; .02 INJECTION, SOLUTION INTRAVENOUS at 14:38

## 2021-07-06 RX ADMIN — FENTANYL CITRATE 25 MCG: 50 INJECTION INTRAMUSCULAR; INTRAVENOUS at 16:25

## 2021-07-06 RX ADMIN — DEXMEDETOMIDINE HCL 4 MCG: 100 INJECTION INTRAVENOUS at 16:38

## 2021-07-06 RX ADMIN — KETAMINE HYDROCHLORIDE 10 MG: 50 INJECTION, SOLUTION INTRAMUSCULAR; INTRAVENOUS at 15:54

## 2021-07-06 RX ADMIN — MIDAZOLAM 1 MG: 1 INJECTION INTRAMUSCULAR; INTRAVENOUS at 15:50

## 2021-07-06 RX ADMIN — HYDROMORPHONE HYDROCHLORIDE 0.5 MG: 1 INJECTION, SOLUTION INTRAMUSCULAR; INTRAVENOUS; SUBCUTANEOUS at 18:00

## 2021-07-06 RX ADMIN — DEXMEDETOMIDINE HCL 4 MCG: 100 INJECTION INTRAVENOUS at 16:27

## 2021-07-06 RX ADMIN — DEXMEDETOMIDINE HCL 4 MCG: 100 INJECTION INTRAVENOUS at 16:23

## 2021-07-06 RX ADMIN — HYDROMORPHONE HYDROCHLORIDE 0.5 MG: 1 INJECTION, SOLUTION INTRAMUSCULAR; INTRAVENOUS; SUBCUTANEOUS at 18:05

## 2021-07-06 RX ADMIN — MIDAZOLAM 2 MG: 1 INJECTION INTRAMUSCULAR; INTRAVENOUS at 16:17

## 2021-07-06 RX ADMIN — KETAMINE HYDROCHLORIDE 10 MG: 50 INJECTION, SOLUTION INTRAMUSCULAR; INTRAVENOUS at 15:52

## 2021-07-06 RX ADMIN — Medication 25 MCG: at 17:28

## 2021-07-06 RX ADMIN — KETAMINE HYDROCHLORIDE 10 MG: 50 INJECTION, SOLUTION INTRAMUSCULAR; INTRAVENOUS at 15:57

## 2021-07-06 RX ADMIN — KETAMINE HYDROCHLORIDE 10 MG: 50 INJECTION, SOLUTION INTRAMUSCULAR; INTRAVENOUS at 16:16

## 2021-07-06 RX ADMIN — SODIUM CHLORIDE, SODIUM LACTATE, POTASSIUM CHLORIDE, AND CALCIUM CHLORIDE: .6; .31; .03; .02 INJECTION, SOLUTION INTRAVENOUS at 15:43

## 2021-07-06 NOTE — DISCHARGE SUMMARY
Discharge Summary - Alphonse Herrera 37 y o  male MRN: [id number]    Unit/Bed#: [facility] Encounter: [id number]    Admission Date: 7/6/2021    Admitting Diagnosis: Perineal abscess [L02 [id number]]    HPI:   Alphonse Herrera is a 37 y o  male who presents with LLE cellulitis, L thigh fluid collection, s/p bedside I&D on 5/26, OR I&D on 5/26, L thigh washout/closure on 5/28 presenting today for debridement of L extremity and excision of L extremity abscess  Procedure was performed under local anesthesia  Patient tolerated procedure well with no acute complications  Patient is stable for discharge from a surgical standpoint  Procedures Performed: No orders of the defined types were placed in this encounter  DEBRIDEMENT WOUND [facility] OUT) LEFT  EXCISION PERINEAL ABSCESS LEFT THIGH    Summary of Hospital Course:   Patient presented for scheduled operation  Operation was performed under local anesthesia with no acute complications  Patient tolerated procedure well  Patient to be discharged home with wound care instructions for daily dressing changes  Significant Findings, Care, Treatment and Services Provided: perineal abscess to left groin excised     Complications: None    Discharge Diagnosis: perineal abscess    Medical Problems     Resolved Problems  Date Reviewed: 5/29/2021    None                Condition at Discharge: good         Discharge instructions/Information to patient and family:   See after visit summary for information provided to patient and family  Provisions for Follow-Up Care:  See after visit summary for information related to follow-up care and any pertinent home health orders  PCP: Cruzito Rascon MD    Disposition: Home    Planned Readmission: No      Discharge Statement   I spent 15 minutes discharging the patient  This time was spent on the day of discharge  I had direct contact with the patient on the day of discharge   Additional documentation is required if more than 30 minutes were spent on discharge  Discharge Medications:  See after visit summary for reconciled discharge medications provided to patient and family

## 2021-07-06 NOTE — ANESTHESIA PREPROCEDURE EVALUATION
Procedure:  DEBRIDEMENT WOUND [facility] OUT) (Left Groin)  EXCISION PERINEAL ABSCESS LEFT THIGH (Left Groin)    Relevant Problems   ANESTHESIA   (+) Difficult intubation      CARDIO   (+) CAD (coronary artery disease)   (+) HTN (hypertension)   (+) Hypertriglyceridemia   (+) Type 1 non-ST elevation myocardial infarction (NSTEMI) (HCC)      ENDO   (+) DM type 2, uncontrolled, with neuropathy (HCC)      NEURO/PSYCH   (+) Diabetic neuropathy (HCC)      PULMONARY   (+) Shortness of breath      Other   (+) Morbid (severe) obesity due to excess calories ([facility] [address] )      Reported h/o difficult intubation  Most recent intubation on 5/26/21 VLx1 Araya 3 G1V    TTE 4/6/21   SUMMARY     PROCEDURE INFORMATION:  This was a technically difficult study  Intravenous contrast ( 0 6 ml Definity/NSS) was administered      LEFT VENTRICLE:  Systolic function was normal  Ejection fraction was estimated to be 65 %    There were no regional wall motion abnormalities      RIGHT VENTRICLE:  The size was normal   Systolic function was normal     Lab Results   Component Value Date    WBC 10 58 (H) 06/06/2021    HGB 8 9 (L) 06/06/2021    HCT 30 7 (L) 06/06/2021    MCV 84 06/06/2021     (H) 06/06/2021     Lab Results   Component Value Date     12/29/2016    K 4 7 06/06/2021    CO2 30 06/06/2021     06/06/2021    BUN 24 06/06/2021    CREATININE 0 99 06/06/2021     Lab Results   Component Value Date    INR 1 20 (H) 05/26/2021    INR 1 14 02/22/2021    INR 0 98 12/01/2020    PROTIME 15 1 (H) 05/26/2021    PROTIME 14 5 02/22/2021    PROTIME 13 0 12/01/2020     Lab Results   Component Value Date    PTT 36 05/26/2021       Lab Results   Component Value Date    GLUCOSE 135 05/26/2021    GLUCOSE 100 (H) 12/29/2016       Lab Results   Component Value Date    HGBA1C 7 7 (H) 03/31/2021       Type and Screen:  A    Physical Exam    Airway    Mallampati score: III  TM Distance: >3 FB  Neck ROM: full     Dental   Comment: Multiple missing molars, Cardiovascular      Pulmonary      Other Findings        Anesthesia Plan  ASA Score- 3     Anesthesia Type- general with ASA Monitors  Additional Monitors:   Airway Plan: ETT  Plan Factors-Exercise tolerance (METS): >4 METS  Chart reviewed  EKG reviewed  Imaging results reviewed  Existing labs reviewed  Patient summary reviewed              Induction-     Postoperative Plan-     Informed Consent-

## 2021-07-06 NOTE — H&P
Acute Care Surgery  History and Physical  González Pettit 37 y o  male MRN: [id number]  Unit/Bed#: OR [place] Encounter: [id number]    Assessment and Plan:  González Pettit is a 37 y o  male who presents with LLE cellulitis, L thigh fluid collection, s/p bedside I&D on 5/26, OR I&D on 5/26, L thigh washout/closure on 5/28 presenting today for debridement of L extr and L extr abscess  AVSS, doing well, consented and agreeable to surgery      History of Present Illness     HPI:  González Pettit is a 37 y o  male who presents with LLE cellulitis, L thigh fluid collection, s/p bedside I&D on 5/26, OR I&D on 5/26, L thigh washout/closure on 5/28 presenting today for debridement of L extr and L extr abscess  No acute complaints  Review of Systems   Constitutional: Negative  HENT: Negative  Eyes: Negative  Respiratory: Negative  Cardiovascular: Negative  Gastrointestinal: Negative  Endocrine: Negative  Genitourinary: Negative  Musculoskeletal: Negative  Skin: Positive for wound (L ext wound, small abscess)  Allergic/Immunologic: Negative  Neurological: Negative  Hematological: Negative  Psychiatric/Behavioral: Negative          Historical Information   Past Medical History:   Diagnosis Date    Breathing difficulty     Coronary artery disease     Diabetes mellitus ([facility] [address] )     Heart disease     CAD   s/p ptca with 1 stent 2012    Hidradenitis suppurativa     groin    History of transfusion     Hyperlipidemia     Hypertension     MI (myocardial infarction) ([facility] [address] )     " silent " M I  in the past    Morbid obesity with BMI of 50 0-59 9, adult ([facility] [address] )     Myocardial infarction ([facility] [address] )     Nicotine dependence     Obesity     Pilonidal cyst      Past Surgical History:   Procedure Laterality Date    CORONARY ANGIOPLASTY WITH STENT PLACEMENT      INCISION AND DRAINAGE OF WOUND N/A 1/24/2017    Procedure: INCISION AND DRAINAGE (I&D) BUTTOCK, PILONIDAL CYST;  Surgeon: Ivania Christianson MD;  Location: [facility];  Service:     LEG SURGERY Left     I&D of left leg 2012    NE EXC SKIN BENIG >4 CM TRUNK,ARM,LEG Left 2021    Procedure: EXCISION THIGH MASS;  Surgeon: Aditya Gaona MD;  [facility];  Service: General    NE NEG PRESS WOUND TX, < 50 CM Left 2021    Procedure: APPLICATION VAC DRESSING THIGH;  Surgeon: Aditya Gaona MD;  [facility];  Service: General    WOUND DEBRIDEMENT Left 2021    Procedure: DEBRIDEMENT WOUND AND DRESSING CHANGE ([address] OUT); Surgeon: Devyn Johnston DO;  [facility];  Service: General    WOUND DEBRIDEMENT Left 2021    Procedure: DEBRIDEMENT LOWER EXTREMITY [facility] OUT), left groin and thigh;  Surgeon: Aditya Gaona MD;  [facility];  Service: General    WOUND DEBRIDEMENT Left 2021    Procedure: DEBRIDEMENT LOWER EXTREMITY ([address] OUT); Surgeon:  Wilton Marion DO;  [facility];  Service: General    WOUND DEBRIDEMENT Left 2021    Procedure: Washout left thigh wound and closure;  Surgeon: Pearl Drew DO;  [facility];  Service: General     Social History   Social History     Substance and Sexual Activity   Alcohol Use Not Currently    Comment: rarely     Social History     Substance and Sexual Activity   Drug Use No     Social History     Tobacco Use   Smoking Status Former Smoker    Packs/day: 1 00    Years: 20 00    Pack years: 20 00    Types: Cigarettes    Quit date: 2021    Years since quittin 3   Smokeless Tobacco Never Used     Family History: non-contributory    Meds/Allergies   all medications and allergies reviewed  Allergies   Allergen Reactions    Amoxicillin Hives       Objective   First Vitals:   Blood Pressure: 154/84 (21 1305)  Pulse: 82 (21 1305)  Temperature: 99 °F (37 2 °C) (21 1305)  Temp Source: Tympanic (21 1305)  Respirations: 20 (21 1305)  Height: 5' 7" (170 2 cm) (21 1321)  Weight - Scale: (!) 157 kg (347 lb) (07/06/21 1321)  SpO2: 99 % (07/06/21 1305)    Current Vitals:   Blood Pressure: 154/84 (07/06/21 1305)  Pulse: 82 (07/06/21 1305)  Temperature: 99 °F (37 2 °C) (07/06/21 1305)  Temp Source: Tympanic (07/06/21 1305)  Respirations: 20 (07/06/21 1305)  Height: 5' 7" (170 2 cm) (07/06/21 1321)  Weight - Scale: (!) 157 kg (347 lb) (07/06/21 1321)  SpO2: 99 % (07/06/21 1305)    No intake or output data in the 24 hours ending 07/06/21 1524    Invasive Devices     Peripheral Intravenous Line            Peripheral IV 07/06/21 Right Antecubital <1 day          Drain            Open Drain Left Thigh 39 days    Open Drain Left Thigh 39 days                Physical Exam  HENT:      Head: Normocephalic and atraumatic  Mouth/Throat:      Mouth: Mucous membranes are moist    Eyes:      Extraocular Movements: Extraocular movements intact  Pupils: Pupils are equal, round, and reactive to light  Cardiovascular:      Rate and Rhythm: Normal rate and regular rhythm  Pulmonary:      Effort: Pulmonary effort is normal       Breath sounds: Normal breath sounds  Abdominal:      General: Abdomen is flat  Palpations: Abdomen is soft  Musculoskeletal:         General: Normal range of motion  Skin:     General: Skin is warm and dry  Neurological:      General: No focal deficit present  Mental Status: He is alert and oriented to person, place, and time  Psychiatric:         Mood and Affect: Mood normal          Behavior: Behavior normal          Lab Results: I have personally reviewed pertinent lab results  Imaging: I have personally reviewed pertinent reports  EKG, Pathology, and Other Studies: I have personally reviewed pertinent reports          Enriqueta Gavin MD  7/6/2021

## 2021-07-06 NOTE — OP NOTE
OPERATIVE REPORT  PATIENT NAME: Keysha Wasserman    :  1980  MRN: [id number]  Pt Location: [facility] OR ROOM 03    SURGERY DATE: 2021    Surgeon(s) and Role:     * Dee Hawkins MD - Primary     * Rodriguez Reno MD - Assisting     * Tayo Rouse MD - Observing    Preop Diagnosis:  Perineal abscess [L02 215]    Post-Op Diagnosis Codes:     * Perineal abscess [L02 215]    Procedure(s) (LRB):  DEBRIDEMENT WOUND ([address] OUT) (Left)  EXCISION PERINEAL ABSCESS LEFT THIGH (Left)    Specimen(s):  ID Type Source Tests Collected by Time Destination   1 : left groin Tissue Groin TISSUE EXAM Dee Hawkins MD 2021 1631        Estimated Blood Loss:   Minimal    Drains:  Open Drain Left Thigh (Active)   Number of days: 39       Open Drain Left Thigh (Active)   Number of days: 39       Anesthesia Type:   IV Sedation with Anesthesia    Operative Indications:  Perineal abscess [L02 215]    Operative Findings:  Perineal abscess to L groin    Complications:   None    Procedure and Technique:    Patient was brought to the operating room  He was identified verbally and via hospital supplied R band  He is transferred to the operating room table in the supine position  The left groin was prepped and draped in usual sterile fashion  A brief time-out was performed confirming the correct patient, procedure, site with all parties in agreement we proceeded  Procedural sedation was provided by the anesthesia team and  0 5% Marcaine was used to infiltrate skin and subcutaneous tissues  Left groin appeared edematous, tender, but with no spontaneous drainage appreciated  An 8 x 15 x 0 5 cm elliptical incision was made with a hashtag 15 blade through the skin and subcutaneous tissues  Bovie electrocautery was used to achieve hemostasis which was deemed satisfactory  Specimen was passed off the table for routine pathologic evaluation    The left groin was dressed with Surgicel, 4x4s, Kerlix, and the left leg was wrapped from the foot to the hip with Ace bandages  Sedation was gently reversed and the patient was returned to the PACU in stable condition  Dr Kevin Cunningham was present for the entire procedure      Patient Disposition:  PACU     SIGNATURE: Zheng Ortiz MD  DATE: July 6, 2021  TIME: 5:11 PM

## 2021-07-06 NOTE — ANESTHESIA POSTPROCEDURE EVALUATION
Post-Op Assessment Note    CV Status:  Stable  Pain Score: 0    Pain management: adequate     Mental Status:  Alert and awake   Hydration Status:  Stable   PONV Controlled:  None   Airway Patency:  Patent      Post Op Vitals Reviewed: Yes      Staff: CRNA         No complications documented      BP   120/59   Temp   97 2   Pulse  84   Resp   14   SpO2   98%

## 2021-07-06 NOTE — INTERVAL H&P NOTE
H&P reviewed  After examining the patient I find no changes in the patients condition since the H&P had been written      Vitals:    07/06/21 1305   BP: 154/84   Pulse: 82   Resp: 20   Temp: 99 °F (37 2 °C)   SpO2: 99%

## 2021-07-06 NOTE — DISCHARGE INSTRUCTIONS
Wound care instructions for L groin:    Daily dressing changes with 4x4 gauze, kerlix, and ACE bandages  Abscess   WHAT YOU NEED TO KNOW:   A warm compress may help your abscess drain  Your healthcare provider may make a cut in the abscess so it can drain  You may need surgery to remove an abscess that is on your hands or buttocks  DISCHARGE INSTRUCTIONS:   Return to the emergency department if:   · The area around your abscess becomes very painful, warm, or has red streaks  · You have a fever and chills  · Your heart is beating faster than usual      · You feel faint or confused  Contact your healthcare provider if:   · Your abscess gets bigger or does not get better  · Your abscess returns  · You have questions or concerns about your condition or care  Medicines: You may  need any of the following:    · Acetaminophen  decreases pain and fever  It is available without a doctor's order  Ask how much to take and how often to take it  Follow directions  Acetaminophen can cause liver damage if not taken correctly  · NSAIDs , such as ibuprofen, help decrease swelling, pain, and fever  This medicine is available with or without a doctor's order  NSAIDs can cause stomach bleeding or kidney problems in certain people  If you take blood thinner medicine, always ask your healthcare provider if NSAIDs are safe for you  Always read the medicine label and follow directions  · Take your medicine as directed  Contact your healthcare provider if you think your medicine is not helping or if you have side effects  Tell him or her if you are allergic to any medicine  Keep a list of the medicines, vitamins, and herbs you take  Include the amounts, and when and why you take them  Bring the list or the pill bottles to follow-up visits  Carry your medicine list with you in case of an emergency  Self-care:   · Daily dressing changes with 4x4 gauze, kerlix, and ACE bandages       · Do not share your clothes, towels, or sheets with anyone  This can spread the infection to others  · Wash your hands often  This can help prevent the spread of germs  Use soap and water or an alcohol-based hand rub  Care for your wound after it is drained:   · Care for your wound as directed  If your healthcare provider says it is okay, carefully remove the bandage and gauze packing  You may need to soak the gauze to get it out of your wound  Clean your wound and the area around it as directed  Dry the area and put on new, clean bandages  Change your bandages when they get wet or dirty  Follow up with your healthcare provider in 1 to 3 days: You may need to have your packing removed or your bandage changed  Write down your questions so you remember to ask them during your visits  © Copyright [facility] [address] Information is for End User's use only and may not be sold, redistributed or otherwise used for commercial purposes  All illustrations and images included in CareNotes® are the copyrighted property of [employer]  or [facility] Brigid Pedroza   The above information is an  only  It is not intended as medical advice for individual conditions or treatments  Talk to your doctor, nurse or pharmacist before following any medical regimen to see if it is safe and effective for you

## 2021-07-13 ENCOUNTER — OFFICE VISIT (OUTPATIENT)
Dept: SURGERY | Facility: CLINIC | Age: 41
End: 2021-07-13

## 2021-07-13 VITALS — WEIGHT: 315 LBS | HEIGHT: 67 IN | BODY MASS INDEX: 49.44 KG/M2 | HEART RATE: 72 BPM | TEMPERATURE: 97.7 F

## 2021-07-13 DIAGNOSIS — Z09 POSTOP CHECK: ICD-10-CM

## 2021-07-13 DIAGNOSIS — L73.2 HIDRADENITIS SUPPURATIVA: Primary | ICD-10-CM

## 2021-07-13 PROCEDURE — 3008F BODY MASS INDEX DOCD: CPT | Performed by: SURGERY

## 2021-07-13 PROCEDURE — 99024 POSTOP FOLLOW-UP VISIT: CPT | Performed by: SURGERY

## 2021-07-13 NOTE — PROGRESS NOTES
Arsen Reynolds is a 37 y o  male who presents today for a dressing change  Patient has a surgical incision wound which is located on the left groin  Pain is rated 6/10  The following portions of the patient's history were reviewed by a provider in this encounter and updated as appropriate:   No text in SmartText          Objective   Pulse 72   Temp 97 7 °F (36 5 °C)   Ht 5' 7" (1 702 m)   Wt (!) 163 kg (360 lb)   BMI 56 38 kg/m²   Wound:  Open, no erythema, some drainage  Assessment/Plan   Shower bid    Dressing change tid with dry gauze given amount of drainage      Randell Bourne MD

## 2021-07-13 NOTE — PROGRESS NOTES
Office Visit - General Surgery  Kim Christy MRN: [id number]  Encounter: [id number]    Assessment and Plan    Problem List Items Addressed This Visit     None          Chief Complaint:  Kim Christy is a 37 y o  male who presents for Post-op    Subjective      Past Medical History  Past Medical History:   Diagnosis Date    Breathing difficulty     Coronary artery disease     Diabetes mellitus ([facility] )     Heart disease     CAD   s/p ptca with 1 stent 2012    Hidradenitis suppurativa     groin    History of transfusion     Hyperlipidemia     Hypertension     MI (myocardial infarction) ([facility] )     " silent " M I  in the past    Morbid obesity with BMI of 50 0-59 9, adult ([facility] )     Myocardial infarction ([facility] )     Nicotine dependence     Obesity     Pilonidal cyst        Past Surgical History  Past Surgical History:   Procedure Laterality Date    CORONARY ANGIOPLASTY WITH STENT PLACEMENT      INCISION AND DRAINAGE OF WOUND N/A 1/24/2017    Procedure: INCISION AND DRAINAGE (I&D) BUTTOCK, PILONIDAL CYST;  Surgeon: Ruben Nicole MD;  Location: [address] [place] [address];  Service:    [facility] LEG SURGERY Left     I&D of left leg 2012    TX DEBRIDEMENT, SKIN, SUB-Q TISSUE,=<20 SQ CM Left 7/6/2021    Procedure: DEBRIDEMENT WOUND [facility] OUT); Surgeon: Ochoa Maier MD;  [facility];  Service: General    TX EXC SKIN BENIG >4 CM TRUNK,ARM,LEG Left 4/6/2021    Procedure: EXCISION THIGH MASS;  Surgeon: Ochoa Maier MD;  [facility];  Service: General    TX EXC SWEAT GLAND [employer] Left 7/6/2021    Procedure: EXCISION PERINEAL ABSCESS LEFT THIGH;  Surgeon: Ochoa Maier MD;  [facility];  Service: General    TX NEG PRESS WOUND [place], < 50 CM Left 4/6/2021    Procedure: APPLICATION VAC DRESSING THIGH;  Surgeon: Ochoa Maier MD;  [facility];  Service: General    WOUND DEBRIDEMENT Left 4/17/2021    Procedure: DEBRIDEMENT WOUND AND DRESSING CHANGE ([facility]);   Surgeon: Paco Davidson DO; [facility];  Service: General    WOUND DEBRIDEMENT Left 2021    Procedure: DEBRIDEMENT LOWER EXTREMITY [facility] OUT), left groin and thigh;  Surgeon: Shanti Iglesias MD;  [facility];  Service: General    WOUND DEBRIDEMENT Left 2021    Procedure: DEBRIDEMENT LOWER EXTREMITY ([address] OUT); Surgeon: Nuala Felty, DO;  [facility];  Service: General    WOUND DEBRIDEMENT Left 2021    Procedure: Washout left thigh wound and closure;  Surgeon: Mary Clifford DO;  [facility];  Service: General       Family History  Family History   Problem Relation Age of Onset    Heart disease Father     Diabetes Father     Hypertension Mother     Heart disease Mother        Social History  Social History     Socioeconomic History    Marital status: Single     Spouse name: Not on file    Number of children: Not on file    Years of education: Not on file    Highest education level: Not on file   Occupational History    Not on file   Tobacco Use    Smoking status: Former Smoker     Packs/day: 1 00     Years: 20 00     Pack years: 20 00     Types: Cigarettes     Quit date: 2021     Years since quittin 3    Smokeless tobacco: Never Used   Vaping Use    Vaping Use: Never used   Substance and Sexual Activity    Alcohol use: Not Currently     Comment: rarely    Drug use: No    Sexual activity: Not Currently   Other Topics Concern    Not on file   Social History Narrative    Not on file     Social Determinants of Health     Financial Resource Strain:     Difficulty of Paying Living Expenses:    Food Insecurity:     Worried About Running Out of Food in the Last Year:     Ran Out of Food in the Last Year:    Transportation Needs:     Lack of Transportation (Medical):      Lack of Transportation (Non-Medical):    Physical Activity:     Days of Exercise per Week:     Minutes of Exercise per Session:    Stress:     Feeling of Stress :    Social Connections:     Frequency of Communication with Friends and Family:     Frequency of Social Gatherings with Friends and Family:     Attends [employer] Services:     Active Member of Clubs or Organizations:     Attends Club or Organization Meetings:     Marital Status:    Intimate Partner Violence:     Fear of Current or Ex-Partner:     Emotionally Abused:     Physically Abused:     Sexually Abused:         Medications  Current Outpatient Medications on File Prior to Visit   Medication Sig Dispense Refill    acetaminophen (TYLENOL) 325 mg tablet Take 3 tablets (975 mg total) by mouth every 8 (eight) hours (Patient taking differently: Take 975 mg by mouth as needed )  0    amLODIPine (NORVASC) 10 mg tablet Take 1 tablet (10 mg total) by mouth daily 90 tablet 0    aspirin (ECOTRIN LOW STRENGTH) 81 mg EC tablet Take 1 tablet (81 mg total) by mouth daily 30 tablet 0    atorvastatin (LIPITOR) 80 mg tablet Take 1 tablet (80 mg total) by mouth daily (Patient taking differently: Take 80 mg by mouth daily at bedtime ) 90 tablet 2    carvedilol (COREG) 6 25 mg tablet Take 1 tablet (6 25 mg total) by mouth 2 (two) times a day with meals 120 tablet 2    glimepiride (AMARYL) 4 mg tablet Take 1 tablet (4 mg total) by mouth daily with breakfast 90 tablet 1    lisinopril (ZESTRIL) 10 mg tablet Take 1 tablet (10 mg total) by mouth daily 90 tablet 1    metFORMIN (GLUCOPHAGE) 1000 MG tablet Take 1 tablet (1,000 mg total) by mouth 2 (two) times a day with meals 180 tablet 1    Omega-3 Fatty Acids (fish oil) 1,000 mg Take 1 capsule (1,000 mg total) by mouth 2 (two) times a day 180 capsule 2    oxyCODONE (ROXICODONE) 5 mg immediate release tablet Take 1 tablet (5 mg total) by mouth every 4 (four) hours as needed for moderate pain for up to 10 daysMax Daily Amount: 30 mg 30 tablet 0    Ticagrelor (BRILINTA PO) Take by mouth every 12 (twelve) hours        No current facility-administered medications on file prior to visit  Allergies  Allergies   Allergen Reactions    Amoxicillin Hives       Review of Systems    Objective  There were no vitals filed for this visit      Physical Exam

## 2021-07-13 NOTE — PATIENT INSTRUCTIONS
Shower with soap and water and clean groin bid  Place dry dressing and change 3x day    Use snug underwear to keep in place

## 2021-07-19 ENCOUNTER — OFFICE VISIT (OUTPATIENT)
Dept: SURGERY | Facility: CLINIC | Age: 41
End: 2021-07-19
Payer: COMMERCIAL

## 2021-07-19 VITALS — TEMPERATURE: 97.6 F | WEIGHT: 315 LBS | BODY MASS INDEX: 49.44 KG/M2 | HEIGHT: 67 IN

## 2021-07-19 DIAGNOSIS — S71.102D OPEN THIGH WOUND, LEFT, SUBSEQUENT ENCOUNTER: Primary | ICD-10-CM

## 2021-07-19 PROCEDURE — 1036F TOBACCO NON-USER: CPT | Performed by: SURGERY

## 2021-07-19 PROCEDURE — 99213 OFFICE O/P EST LOW 20 MIN: CPT | Performed by: SURGERY

## 2021-07-19 NOTE — PROGRESS NOTES
Office Visit - General Surgery  Ted Hatfield MRN: [id number]  Encounter: [id number]    Assessment and Plan  38 yo male presents for wound check s/p left groin wound wash out and excision of perineal abscess of left thigh  Open w/o erythema     - Wound check follow up in 2 weeks  - BID shower, use of sponge to clean area  - Dressing changes with gauze  Use abd pads, diapers or pads for drainage    Chief Complaint:  Ted Hatfield is a 37 y o  male who presents for Wound Check (Wound check  Scrotum)  Pt has a left inguinal wound s/p surgical incision for perineal abscess as well as large superficial wound with healthy appearing granulation tissue  Subjective  Pt reports frequent drainage from wound site requiring multiple abd pads daily as well as saturating his clothing  Pt denies pain to the area and reports he cleans it daily in the shower using a wash cloth  Past Medical History  Past Medical History:   Diagnosis Date    Breathing difficulty     Coronary artery disease     Diabetes mellitus ([address] )     Heart disease     CAD   s/p ptca with 1 stent 2012    Hidradenitis suppurativa     groin    History of transfusion     Hyperlipidemia     Hypertension     MI (myocardial infarction) ([address] )     " silent " M I  in the past    Morbid obesity with BMI of 50 0-59 9, adult (HCC)     [facility]     Nicotine dependence     Obesity     Pilonidal cyst        Past Surgical History  Past Surgical History:   Procedure Laterality Date    CORONARY ANGIOPLASTY WITH STENT PLACEMENT      INCISION AND DRAINAGE OF WOUND N/A 1/24/2017    Procedure: INCISION AND DRAINAGE (I&D) BUTTOCK, PILONIDAL CYST;  Surgeon: Lit Stallworth MD;  Location: [address] [place] [address];  Service:    [facility] LEG SURGERY Left     I&D of left leg 2012    [place] DEBRIDEMENT, SKIN, SUB-Q TISSUE,=<20 SQ CM Left 7/6/2021    Procedure: DEBRIDEMENT WOUND [facility] OUT);   Surgeon: Danyell Baer MD;  [facility];  Service: General    [place] EXC SKIN BENIG >4 CM TRUNK,ARM,LEG Left 2021    Procedure: EXCISION THIGH MASS;  Surgeon: Guillermo Woods MD;  [facility];  Service: General    CA EXC SWEAT GLAND Justin Ran Left 2021    Procedure: EXCISION PERINEAL ABSCESS LEFT THIGH;  Surgeon: Guillermo Woods MD;  [facility];  Service: General    [place] NEG PRESS WOUND [place], < 50 CM Left 2021    Procedure: APPLICATION VAC DRESSING THIGH;  Surgeon: Guillermo Woods MD;  [facility];  Service: General    WOUND DEBRIDEMENT Left 2021    Procedure: DEBRIDEMENT WOUND AND DRESSING CHANGE ([address] OUT); Surgeon: Aziza Mireles DO;  [facility];  Service: General    WOUND DEBRIDEMENT Left 2021    Procedure: DEBRIDEMENT LOWER EXTREMITY [facility] OUT), left groin and thigh;  Surgeon: Guillermo Woods MD;  [facility];  Service: General    WOUND DEBRIDEMENT Left 2021    Procedure: DEBRIDEMENT LOWER EXTREMITY ([address] OUT); Surgeon:  Eli Aguilar DO;  [facility];  Service: General    WOUND DEBRIDEMENT Left 2021    Procedure: Washout left thigh wound and closure;  Surgeon: Jason Abdullahi DO;  [facility];  Service: General       Family History  Family History   Problem Relation Age of Onset    Heart disease Father     Diabetes Father     Hypertension Mother     Heart disease Mother        Social History  Social History     Socioeconomic History    Marital status: Single     Spouse name: None    Number of children: None    Years of education: None    Highest education level: None   Occupational History    None   Tobacco Use    Smoking status: Former Smoker     Packs/day: 1 00     Years: 20 00     Pack years: 20 00     Types: Cigarettes     Quit date: 2021     Years since quittin 3    Smokeless tobacco: Never Used   Vaping Use    Vaping Use: Never used   Substance and Sexual Activity    Alcohol use: Not Currently     Comment: rarely    Drug use: No    Sexual activity: Not Currently   Other Topics Concern    None   Social History Narrative    None     Social Determinants of Health     Financial Resource Strain:     Difficulty of Paying Living Expenses:    Food Insecurity:     Worried About Running Out of Food in the Last Year:     [address] in the Last Year:    Transportation Needs:     Lack of Transportation (Medical):      Lack of Transportation (Non-Medical):    Physical Activity:     Days of Exercise per Week:     Minutes of Exercise per Session:    Stress:     Feeling of Stress :    Social Connections:     Frequency of Communication with Friends and Family:     Frequency of Social Gatherings with Friends and Family:     Attends Mandaeism Services:     Active Member of Clubs or Organizations:     Attends Club or Organization Meetings:     Marital Status:    Intimate Partner Violence:     Fear of Current or Ex-Partner:     Emotionally Abused:     Physically Abused:     Sexually Abused:         Medications  Current Outpatient Medications on File Prior to Visit   Medication Sig Dispense Refill    acetaminophen (TYLENOL) 325 mg tablet Take 3 tablets (975 mg total) by mouth every 8 (eight) hours (Patient taking differently: Take 975 mg by mouth as needed )  0    amLODIPine (NORVASC) 10 mg tablet Take 1 tablet (10 mg total) by mouth daily 90 tablet 0    aspirin (ECOTRIN LOW STRENGTH) 81 mg EC tablet Take 1 tablet (81 mg total) by mouth daily 30 tablet 0    atorvastatin (LIPITOR) 80 mg tablet Take 1 tablet (80 mg total) by mouth daily (Patient taking differently: Take 80 mg by mouth daily at bedtime ) 90 tablet 2    carvedilol (COREG) 6 25 mg tablet Take 1 tablet (6 25 mg total) by mouth 2 (two) times a day with meals 120 tablet 2    glimepiride (AMARYL) 4 mg tablet Take 1 tablet (4 mg total) by mouth daily with breakfast 90 tablet 1    lisinopril (ZESTRIL) 10 mg tablet Take 1 tablet (10 mg total) by mouth daily 90 tablet 1    metFORMIN (GLUCOPHAGE) 1000 MG tablet Take 1 tablet (1,000 mg total) by mouth 2 (two) times a day with meals 180 tablet 1    Omega-3 Fatty Acids (fish oil) 1,000 mg Take 1 capsule (1,000 mg total) by mouth 2 (two) times a day 180 capsule 2    oxyCODONE (ROXICODONE) 5 mg immediate release tablet Take 1 tablet (5 mg total) by mouth every 6 (six) hours as needed for moderate pain for up to 12 dosesMax Daily Amount: 20 mg 12 tablet 0    Ticagrelor (BRILINTA PO) Take by mouth every 12 (twelve) hours        No current facility-administered medications on file prior to visit  Allergies  Allergies   Allergen Reactions    Amoxicillin Hives     Objective  Vitals:    07/19/21 1019   Temp: 97 6 °F (36 4 °C)       Physical Exam  Constitutional:       Appearance: Normal appearance  He is obese  HENT:      Head: Normocephalic and atraumatic  Mouth/Throat:      Mouth: Mucous membranes are moist       Pharynx: Oropharynx is clear  Eyes:      Extraocular Movements: Extraocular movements intact  Conjunctiva/sclera: Conjunctivae normal       Pupils: Pupils are equal, round, and reactive to light  Pulmonary:      Effort: Pulmonary effort is normal    Genitourinary:     Penis: Normal        Testes: Normal    Skin:     Comments: L inguinal wound and L thigh wound, healing well with healthy granulation tissue   Neurological:      General: No focal deficit present  Mental Status: He is alert and oriented to person, place, and time  Mental status is at baseline  Psychiatric:         Mood and Affect: Mood normal          Behavior: Behavior normal          Thought Content:  Thought content normal          Judgment: Judgment normal

## 2021-08-02 ENCOUNTER — OFFICE VISIT (OUTPATIENT)
Dept: SURGERY | Facility: CLINIC | Age: 41
End: 2021-08-02

## 2021-08-02 VITALS — BODY MASS INDEX: 49.44 KG/M2 | TEMPERATURE: 97.7 F | HEIGHT: 67 IN | WEIGHT: 315 LBS | HEART RATE: 81 BPM

## 2021-08-02 DIAGNOSIS — S71.102D OPEN THIGH WOUND, LEFT, SUBSEQUENT ENCOUNTER: Primary | ICD-10-CM

## 2021-08-02 PROCEDURE — 3008F BODY MASS INDEX DOCD: CPT | Performed by: SURGERY

## 2021-08-02 PROCEDURE — 99024 POSTOP FOLLOW-UP VISIT: CPT | Performed by: SURGERY

## 2021-08-02 NOTE — PROGRESS NOTES
Office Visit - General Surgery  Ximena Kapoor MRN: [id number]  Encounter: [id number]    Assessment and Plan    Problem List Items Addressed This Visit        Other    Open thigh wound, left, subsequent encounter - Primary     · Doing well since his last visit  · Will re-apply [address] today in the office  · Patient should follow up in 1 week for wound check and continue local wound care               Chief Complaint:  Ximena Kapoor is a 37 y o  male who presents for Post-op (left thigh wound check, no current complaint of pain)    Subjective  Doing well since his last visit  Drainage has improved in quantity / character  Denies infectious signs/sxs  We discussed proceeding with STSG vs reapplying [facility]  He prefers the latter which I think is reasonable  Past Medical History  Past Medical History:   Diagnosis Date    Breathing difficulty     Coronary artery disease     [facility]     Heart disease     CAD   s/p ptca with 1 stent 2012    Hidradenitis suppurativa     groin    History of transfusion     Hyperlipidemia     Hypertension     MI (myocardial infarction) ([facility] )     " silent " M I  in the past    Morbid obesity with BMI of 50 0-59 9, adult (HCC)     Myocardial infarction ([facility] )     Nicotine dependence     Obesity     Pilonidal cyst        Past Surgical History  Past Surgical History:   Procedure Laterality Date    CORONARY ANGIOPLASTY WITH STENT PLACEMENT      INCISION AND DRAINAGE OF WOUND N/A 1/24/2017    Procedure: INCISION AND DRAINAGE (I&D) BUTTOCK, PILONIDAL CYST;  Surgeon: Alexandra Vu MD;  Location: [address] [place] [address];  Service:    [facility] LEG SURGERY Left     I&D of left leg 2012    [place] DEBRIDEMENT, SKIN, SUB-Q TISSUE,=<20 SQ CM Left 7/6/2021    Procedure: DEBRIDEMENT WOUND [facility] OUT);   Surgeon: Shon Fernandes MD;  [facility];  Service: General    OK EXC SKIN BENIG >4 CM TRUNK,ARM,LEG Left 4/6/2021    Procedure: EXCISION THIGH MASS;  Surgeon: Shon Fernandes MD;  [facility];  Service: General    [place] EXC SWEAT GLAND Valentino Filter Left 2021    Procedure: EXCISION PERINEAL ABSCESS LEFT THIGH;  Surgeon: John Leonard MD;  [facility];  Service: General    [place] NEG PRESS WOUND [place], < 50 CM Left 2021    Procedure: APPLICATION VAC DRESSING THIGH;  Surgeon: John Leonard MD;  [facility];  Service: General    WOUND DEBRIDEMENT Left 2021    Procedure: DEBRIDEMENT WOUND AND DRESSING CHANGE ([address] OUT); Surgeon: Evelyne Cain DO;  [facility];  Service: General    WOUND DEBRIDEMENT Left 2021    Procedure: DEBRIDEMENT LOWER EXTREMITY [facility]), left groin and thigh;  Surgeon: John Leonard MD;  [facility];  Service: General    WOUND DEBRIDEMENT Left 2021    Procedure: DEBRIDEMENT LOWER EXTREMITY ([address] OUT); Surgeon:  Brandyn Anaya DO;  [facility];  Service: General    WOUND DEBRIDEMENT Left 2021    Procedure: Washout left thigh wound and closure;  Surgeon: Sona Barksdale DO;  [facility];  Service: General       Family History  Family History   Problem Relation Age of Onset    Heart disease Father     Diabetes Father     Hypertension Mother     Heart disease Mother        Social History  Social History     Socioeconomic History    Marital status: Single     Spouse name: Not on file    Number of children: Not on file    Years of education: Not on file    Highest education level: Not on file   Occupational History    Not on file   Tobacco Use    Smoking status: Former Smoker     Packs/day: 1 00     Years: 20 00     Pack years: 20 00     Types: Cigarettes     Quit date: 2021     Years since quittin 4    Smokeless tobacco: Never Used   Vaping Use    Vaping Use: Never used   Substance and Sexual Activity    Alcohol use: Not Currently     Comment: rarely    Drug use: No    Sexual activity: Not Currently   Other Topics Concern    Not on file   Social History Narrative    Not on file     Social Determinants of Health     Financial Resource Strain:     Difficulty of Paying Living Expenses:    Food Insecurity:     Worried About Running Out of Food in the Last Year:     [address] in the Last Year:    Transportation Needs:     Lack of Transportation (Medical):      Lack of Transportation (Non-Medical):    Physical Activity:     Days of Exercise per Week:     Minutes of Exercise per Session:    Stress:     Feeling of Stress :    Social Connections:     Frequency of Communication with Friends and Family:     Frequency of Social Gatherings with Friends and Family:     Attends Lutheran Services:     Active Member of Clubs or Organizations:     Attends Club or Organization Meetings:     Marital Status:    Intimate Partner Violence:     Fear of Current or Ex-Partner:     Emotionally Abused:     Physically Abused:     Sexually Abused:         Medications  Current Outpatient Medications on File Prior to Visit   Medication Sig Dispense Refill    acetaminophen (TYLENOL) 325 mg tablet Take 3 tablets (975 mg total) by mouth every 8 (eight) hours (Patient taking differently: Take 975 mg by mouth as needed )  0    amLODIPine (NORVASC) 10 mg tablet Take 1 tablet (10 mg total) by mouth daily 90 tablet 0    aspirin (ECOTRIN LOW STRENGTH) 81 mg EC tablet Take 1 tablet (81 mg total) by mouth daily 30 tablet 0    atorvastatin (LIPITOR) 80 mg tablet Take 1 tablet (80 mg total) by mouth daily (Patient taking differently: Take 80 mg by mouth daily at bedtime ) 90 tablet 2    carvedilol (COREG) 6 25 mg tablet Take 1 tablet (6 25 mg total) by mouth 2 (two) times a day with meals 120 tablet 2    glimepiride (AMARYL) 4 mg tablet Take 1 tablet (4 mg total) by mouth daily with breakfast 90 tablet 1    lisinopril (ZESTRIL) 10 mg tablet Take 1 tablet (10 mg total) by mouth daily 90 tablet 1    metFORMIN (GLUCOPHAGE) 1000 MG tablet Take 1 tablet (1,000 mg total) by mouth 2 (two) times a day with meals 180 tablet 1    Omega-3 Fatty Acids (fish oil) 1,000 mg Take 1 capsule (1,000 mg total) by mouth 2 (two) times a day 180 capsule 2    Ticagrelor (BRILINTA PO) Take by mouth every 12 (twelve) hours       oxyCODONE (ROXICODONE) 5 mg immediate release tablet Take 1 tablet (5 mg total) by mouth every 6 (six) hours as needed for moderate pain for up to 12 dosesMax Daily Amount: 20 mg (Patient not taking: Reported on 8/2/2021) 12 tablet 0     No current facility-administered medications on file prior to visit  Allergies  Allergies   Allergen Reactions    Amoxicillin Hives       Review of Systems   Constitutional: Negative for chills and fever  HENT: Negative  Eyes: Negative  Respiratory: Negative for shortness of breath  Cardiovascular: Negative for chest pain  Gastrointestinal: Negative for abdominal pain, diarrhea, nausea and vomiting  Endocrine: Negative  Genitourinary: Negative  Musculoskeletal: Negative  Skin: Positive for wound  Negative for color change  Allergic/Immunologic: Negative  Neurological: Negative  Hematological: Negative  Psychiatric/Behavioral: Negative  Objective  Vitals:    08/02/21 1100   Pulse: 81   Temp: 97 7 °F (36 5 °C)       Physical Exam   GEN: NAD  HEENT: MMM  CV: warm/well perfused  Lung: normal effort  Ab: Soft, NT/ND  Extrem: Left thigh wounds with healthy granulation tissue and minimal serous drainage  Neuro:  A+Ox3, motor and sensation grossly intact

## 2021-08-02 NOTE — LETTER
August 2, 2021     Patient: Jose Alfredo Breaux   YOB: 1980   Date of Visit: 8/2/2021       To Whom it May Concern:    Ney Grimes is under my professional care  He was seen in my office on 8/2/2021  He will continue to need complex wound care prohibiting his return to work for the next 6 weeks  At that time we will reassess for continued disability needs  Thank you for your understanding while Mr Massiel Gallagher continues in his recovery  If you have any questions or concerns, please don't hesitate to call           Sincerely,          [facility]        CC: No Recipients

## 2021-08-02 NOTE — ASSESSMENT & PLAN NOTE
· Doing well since his last visit  · Will re-apply [address] today in the office  · Patient should follow up in 1 week for wound check and continue local wound care

## 2021-08-16 ENCOUNTER — OFFICE VISIT (OUTPATIENT)
Dept: SURGERY | Facility: CLINIC | Age: 41
End: 2021-08-16
Payer: COMMERCIAL

## 2021-08-16 VITALS — BODY MASS INDEX: 49.44 KG/M2 | TEMPERATURE: 97.8 F | HEIGHT: 67 IN | WEIGHT: 315 LBS

## 2021-08-16 DIAGNOSIS — S71.102D OPEN THIGH WOUND, LEFT, SUBSEQUENT ENCOUNTER: Primary | ICD-10-CM

## 2021-08-16 PROCEDURE — 3008F BODY MASS INDEX DOCD: CPT | Performed by: STUDENT IN AN ORGANIZED HEALTH CARE EDUCATION/TRAINING PROGRAM

## 2021-08-16 PROCEDURE — 99212 OFFICE O/P EST SF 10 MIN: CPT | Performed by: STUDENT IN AN ORGANIZED HEALTH CARE EDUCATION/TRAINING PROGRAM

## 2021-08-16 PROCEDURE — 1036F TOBACCO NON-USER: CPT | Performed by: STUDENT IN AN ORGANIZED HEALTH CARE EDUCATION/TRAINING PROGRAM

## 2021-08-16 NOTE — ASSESSMENT & PLAN NOTE
45yo M with chronic L groin wound    Plan:  · Wound bed appears ready for STSG, however patient is hesitant to undergo any procedures at this time  · Will not redress with [facility] at this time, as they do not appear to be adherent to the area  · RTC in 2 weeks for wound recheck -- if wound does not appear to be making much progression in terms of size/healing, he may require STSG in the near future

## 2021-08-16 NOTE — PROGRESS NOTES
Office Visit - General Surgery  Gloria Chew MRN: [id number]  Encounter: [id number]    Assessment and Plan    Problem List Items Addressed This Visit        Other    Open thigh wound, left, subsequent encounter - Primary     43yo M with chronic L groin wound    Plan:  · Wound bed appears ready for STSG, however patient is hesitant to undergo any procedures at this time  · Will not redress with [facility] at this time, as they do not appear to be adherent to the area  · RTC in 2 weeks for wound recheck -- if wound does not appear to be making much progression in terms of size/healing, he may require STSG in the near future               Chief Complaint:  Gloria Chew is a 37 y o  male who presents for Wound Check (wound check)    Subjective  Follows-up for wound recheck  Had [address] applied at last visit, but states that it fell off almost immediately after he left the office  No other issues with wound  No other complaints  Past Medical History  Past Medical History:   Diagnosis Date    Breathing difficulty     Coronary artery disease     Diabetes mellitus ([address] )     Heart disease     CAD   s/p ptca with 1 stent 2012    Hidradenitis suppurativa     groin    History of transfusion     Hyperlipidemia     Hypertension     MI (myocardial infarction) ([address] )     " silent " M I  in the past    Morbid obesity with BMI of 50 0-59 9, adult (HCC)     [facility]     Nicotine dependence     Obesity     Pilonidal cyst        Past Surgical History  Past Surgical History:   Procedure Laterality Date    CORONARY ANGIOPLASTY WITH STENT PLACEMENT      INCISION AND DRAINAGE OF WOUND N/A 1/24/2017    Procedure: INCISION AND DRAINAGE (I&D) BUTTOCK, PILONIDAL CYST;  Surgeon: Esha Porras MD;  Location: [address] [place] [address];  Service:    [facility] LEG SURGERY Left     I&D of left leg 2012    [place] DEBRIDEMENT, SKIN, SUB-Q TISSUE,=<20 SQ CM Left 7/6/2021    Procedure: DEBRIDEMENT WOUND [facility]);   Surgeon: Paige Vega MD;  [facility];  Service: General    NE EXC SKIN BENIG >4 CM TRUNK,ARM,LEG Left 2021    Procedure: EXCISION THIGH MASS;  Surgeon: Angella Martinez MD;  [facility];  Service: General    NE EXC SWEAT GLAND [address]eda Ideal Left 2021    Procedure: EXCISION PERINEAL ABSCESS LEFT THIGH;  Surgeon: Angella Martinez MD;  [facility];  Service: General    NE NEG PRESS WOUND TX, < 50 CM Left 2021    Procedure: APPLICATION VAC DRESSING THIGH;  Surgeon: Angella Martinez MD;  [facility];  Service: General    WOUND DEBRIDEMENT Left 2021    Procedure: DEBRIDEMENT WOUND AND DRESSING CHANGE ([address] OUT); Surgeon: Salvatore Archuleta DO;  [facility];  Service: General    WOUND DEBRIDEMENT Left 2021    Procedure: DEBRIDEMENT LOWER EXTREMITY [facility] OUT), left groin and thigh;  Surgeon: Angella Martinez MD;  [facility];  Service: General    WOUND DEBRIDEMENT Left 2021    Procedure: DEBRIDEMENT LOWER EXTREMITY ([address]); Surgeon:  Didi lKein DO;  [facility];  Service: General    WOUND DEBRIDEMENT Left 2021    Procedure: Washout left thigh wound and closure;  Surgeon: Baron Domo DO;  [facility];  Service: General       Family History  Family History   Problem Relation Age of Onset    Heart disease Father     Diabetes Father     Hypertension Mother     Heart disease Mother        Social History  Social History     Socioeconomic History    Marital status: Single     Spouse name: None    Number of children: None    Years of education: None    Highest education level: None   Occupational History    None   Tobacco Use    Smoking status: Former Smoker     Packs/day: 1 00     Years: 20 00     Pack years: 20 00     Types: Cigarettes     Quit date: 2021     Years since quittin 4    Smokeless tobacco: Never Used   Vaping Use    Vaping Use: Never used   Substance and Sexual Activity    Alcohol use: Not Currently Comment: rarely    Drug use: No    Sexual activity: Not Currently   Other Topics Concern    None   Social History Narrative    None     Social Determinants of Health     Financial Resource Strain:     Difficulty of Paying Living Expenses:    Food Insecurity:     Worried About Running Out of Food in the Last Year:     Ran Out of Food in the Last Year:    Transportation Needs:     Lack of Transportation (Medical):      Lack of Transportation (Non-Medical):    Physical Activity:     Days of Exercise per Week:     Minutes of Exercise per Session:    Stress:     Feeling of Stress :    Social Connections:     Frequency of Communication with Friends and Family:     Frequency of Social Gatherings with Friends and Family:     Attends Evangelical Services:     Active Member of Clubs or Organizations:     Attends Club or Organization Meetings:     Marital Status:    Intimate Partner Violence:     Fear of Current or Ex-Partner:     Emotionally Abused:     Physically Abused:     Sexually Abused:         Medications  Current Outpatient Medications on File Prior to Visit   Medication Sig Dispense Refill    acetaminophen (TYLENOL) 325 mg tablet Take 3 tablets (975 mg total) by mouth every 8 (eight) hours (Patient taking differently: Take 975 mg by mouth as needed )  0    amLODIPine (NORVASC) 10 mg tablet Take 1 tablet (10 mg total) by mouth daily 90 tablet 0    aspirin (ECOTRIN LOW STRENGTH) 81 mg EC tablet Take 1 tablet (81 mg total) by mouth daily 30 tablet 0    atorvastatin (LIPITOR) 80 mg tablet Take 1 tablet (80 mg total) by mouth daily (Patient taking differently: Take 80 mg by mouth daily at bedtime ) 90 tablet 2    carvedilol (COREG) 6 25 mg tablet Take 1 tablet (6 25 mg total) by mouth 2 (two) times a day with meals 120 tablet 2    glimepiride (AMARYL) 4 mg tablet Take 1 tablet (4 mg total) by mouth daily with breakfast 90 tablet 1    lisinopril (ZESTRIL) 10 mg tablet Take 1 tablet (10 mg total) by mouth daily 90 tablet 1    metFORMIN (GLUCOPHAGE) 1000 MG tablet Take 1 tablet (1,000 mg total) by mouth 2 (two) times a day with meals 180 tablet 1    Omega-3 Fatty Acids (fish oil) 1,000 mg Take 1 capsule (1,000 mg total) by mouth 2 (two) times a day 180 capsule 2    Ticagrelor (BRILINTA PO) Take by mouth every 12 (twelve) hours       oxyCODONE (ROXICODONE) 5 mg immediate release tablet Take 1 tablet (5 mg total) by mouth every 6 (six) hours as needed for moderate pain for up to 12 dosesMax Daily Amount: 20 mg (Patient not taking: Reported on 8/16/2021) 12 tablet 0     No current facility-administered medications on file prior to visit  Allergies  Allergies   Allergen Reactions    Amoxicillin Hives       Review of Systems   Skin: Positive for wound  All other systems reviewed and are negative        Objective  Vitals:    08/16/21 1117   Temp: 97 8 °F (36 6 °C)       Physical Exam   Gen:  NAD  HENT: MMM  CV:  RRR  Lungs: nl effort  Abd:  soft, NT/ND  Ext:  medial LLE / L groin wound (see image below)    Skin:  healed LLE incisions  Neuro: A&Ox3

## 2021-09-04 DIAGNOSIS — I10 ESSENTIAL HYPERTENSION: ICD-10-CM

## 2021-09-07 RX ORDER — AMLODIPINE BESYLATE 10 MG/1
TABLET ORAL
Qty: 90 TABLET | Refills: 0 | Status: SHIPPED | OUTPATIENT
Start: 2021-09-07 | End: 2021-12-30

## 2021-09-07 NOTE — TELEPHONE ENCOUNTER
Patient refill sent, is due for appointment in office  Last seen more than 6 months ago, please call patient to schedule an in office appointment with PCP

## 2021-09-13 ENCOUNTER — OFFICE VISIT (OUTPATIENT)
Dept: SURGERY | Facility: CLINIC | Age: 41
End: 2021-09-13
Payer: COMMERCIAL

## 2021-09-13 VITALS — WEIGHT: 315 LBS | TEMPERATURE: 97.7 F | HEIGHT: 67 IN | BODY MASS INDEX: 49.44 KG/M2

## 2021-09-13 DIAGNOSIS — S71.102D OPEN THIGH WOUND, LEFT, SUBSEQUENT ENCOUNTER: Primary | ICD-10-CM

## 2021-09-13 PROCEDURE — 99212 OFFICE O/P EST SF 10 MIN: CPT | Performed by: SURGERY

## 2021-09-16 ENCOUNTER — OFFICE VISIT (OUTPATIENT)
Dept: CARDIOLOGY CLINIC | Facility: CLINIC | Age: 41
End: 2021-09-16
Payer: COMMERCIAL

## 2021-09-16 VITALS
TEMPERATURE: 98.1 F | BODY MASS INDEX: 49.44 KG/M2 | WEIGHT: 315 LBS | OXYGEN SATURATION: 96 % | HEIGHT: 67 IN | HEART RATE: 70 BPM | DIASTOLIC BLOOD PRESSURE: 82 MMHG | SYSTOLIC BLOOD PRESSURE: 118 MMHG

## 2021-09-16 DIAGNOSIS — I25.10 CORONARY ARTERY DISEASE, UNSPECIFIED VESSEL OR LESION TYPE, UNSPECIFIED WHETHER ANGINA PRESENT, UNSPECIFIED WHETHER NATIVE OR TRANSPLANTED HEART: Primary | ICD-10-CM

## 2021-09-16 DIAGNOSIS — E78.5 DYSLIPIDEMIA: ICD-10-CM

## 2021-09-16 DIAGNOSIS — I25.10 CORONARY ARTERY DISEASE INVOLVING NATIVE CORONARY ARTERY OF NATIVE HEART WITHOUT ANGINA PECTORIS: ICD-10-CM

## 2021-09-16 DIAGNOSIS — E66.01 OBESITY, MORBID, BMI 50 OR HIGHER (HCC): ICD-10-CM

## 2021-09-16 PROCEDURE — 3074F SYST BP LT 130 MM HG: CPT | Performed by: INTERNAL MEDICINE

## 2021-09-16 PROCEDURE — 3079F DIAST BP 80-89 MM HG: CPT | Performed by: INTERNAL MEDICINE

## 2021-09-16 PROCEDURE — 93000 ELECTROCARDIOGRAM COMPLETE: CPT | Performed by: INTERNAL MEDICINE

## 2021-09-16 PROCEDURE — 1036F TOBACCO NON-USER: CPT | Performed by: INTERNAL MEDICINE

## 2021-09-16 PROCEDURE — 3008F BODY MASS INDEX DOCD: CPT | Performed by: INTERNAL MEDICINE

## 2021-09-16 PROCEDURE — 99214 OFFICE O/P EST MOD 30 MIN: CPT | Performed by: INTERNAL MEDICINE

## 2021-09-16 NOTE — PROGRESS NOTES
Progress Note - Cardiology Office  [facility]    Guanaco Proctor 37 y o  male MRN: [id number]  : 1980  Encounter: [id number]    ASSESSMENT:   S/P NSTEMI 2020, peak troponin was 0 72  Had catheterization and PCI/SABINE of RCA on 2020    Currently chest pain-free and denies any unusual dyspnea     Coronary artery disease,   S/p NSTEMI 2020, peak troponin was 0 72  s/p PCI of RCA on 2020,  History of PCI of LCX in   On aspirin, Brilinta, atorvastatin, lisinopril, Coreg     ECHO:  20 20:  EF 60%     Hypertension:    BP is 118/82 mmHg with heart rate of 70  On amlodipine, Coreg, lisinopril     Hyperlipidemia:  Target LDL is< 70  Currently on atorvastatin 40 mg    2021 LDL was 86, liver enzymes are normal     Type 2 diabetes mellitus:  Managed by PCP     History of Left groin cellulitis/mass:  S/p excision of left groin mass with skin grafting     Chronic Tobacco Abuse  Quit Smoking! Obesity:  BMI is 60 10     RECOMMENDATIONS:  Lipid profile and LFTs      [place] on regular cardiovascular exercise diet and weight loss  Skin re-emphasized to seek help from weight loss center  Prior to his groin mass/surgery he was thinking of gastric bypass    Please call [phone] if any questions  HPI :     Guanaco Proctor is a 37y o  year old male who came for follow up  Patient denies any cardiac symptoms  He had excision of his left groin mass followed by skin grafting and that is still in the process of healing  Patient will be starting work as a  again fairly soon  I discussed with him about his weight and advised him again on weight loss    Patient has been unable to exercise because of groin surgery and also thinks that he has put on weight since he quit smoking which is currently 383 lb      REVIEW OF SYSTEMS:  Denies any chest pain, unusual dyspnea, palpitations or syncope  Complains of left groin tenderness    Historical Information Past Medical History:   Diagnosis Date    Breathing difficulty     Coronary artery disease     Diabetes mellitus ([facility] [place] [address] )     Heart disease     CAD   s/p ptca with 1 stent 2012    Hidradenitis suppurativa     groin    History of transfusion     Hyperlipidemia     Hypertension     MI (myocardial infarction) ([facility] [place] [address] )     " silent " M I  in the past    Morbid obesity with BMI of 50 0-59 9, adult (HCC)     Myocardial infarction ([facility] [place] [address] )     Nicotine dependence     Obesity     Pilonidal cyst      Past Surgical History:   Procedure Laterality Date    CORONARY ANGIOPLASTY WITH STENT PLACEMENT      INCISION AND DRAINAGE OF WOUND N/A 1/24/2017    Procedure: INCISION AND DRAINAGE (I&D) BUTTOCK, PILONIDAL CYST;  Surgeon: Addison Torres MD;  Location: [address] [place] [address];  Service:    Leslie Matilda LEG SURGERY Left     I&D of left leg 2012    WI DEBRIDEMENT, SKIN, SUB-Q TISSUE,=<20 SQ CM Left 7/6/2021    Procedure: DEBRIDEMENT WOUND [facility] OUT); Surgeon: Viv Dobbins MD;  [facility];  Service: General    WI EXC SKIN BENIG >4 CM TRUNK,ARM,LEG Left 4/6/2021    Procedure: EXCISION THIGH MASS;  Surgeon: Viv Dobbins MD;  [facility];  Service: General    WI EXC SWEAT GLAND [place] Pimple Left 7/6/2021    Procedure: EXCISION PERINEAL ABSCESS LEFT THIGH;  Surgeon: Viv Dobbins MD;  [facility];  Service: General    WI NEG PRESS WOUND TX, < 50 CM Left 4/6/2021    Procedure: APPLICATION VAC DRESSING THIGH;  Surgeon: Viv Dobbins MD;  [facility];  Service: General    WOUND DEBRIDEMENT Left 4/17/2021    Procedure: DEBRIDEMENT WOUND AND DRESSING CHANGE ([facility] OUT); Surgeon: Miley Sutton DO;  [facility];  Service: General    WOUND DEBRIDEMENT Left 4/22/2021    Procedure: DEBRIDEMENT LOWER EXTREMITY [facility] OUT), left groin and thigh;  Surgeon: Viv Dobbins MD;  [facility];  Service: General    WOUND DEBRIDEMENT Left 5/26/2021    Procedure: DEBRIDEMENT LOWER EXTREMITY ([facility] OUT);   Surgeon: Nitza Manrique DO;  [facility];  Service: General    WOUND DEBRIDEMENT Left 2021    Procedure: Washout left thigh wound and closure;  Surgeon: Madalyn Frost DO;  [facility];  Service: General     Social History     Substance and Sexual Activity   Alcohol Use Not Currently    Comment: rarely     Social History     Substance and Sexual Activity   Drug Use No     Social History     Tobacco Use   Smoking Status Former Smoker    Packs/day: 1 00    Years: 20 00    Pack years: 20 00    Types: Cigarettes    Quit date: 2021    Years since quittin 5   Smokeless Tobacco Never Used     Family History:   Family History   Problem Relation Age of Onset    Heart disease Father     Diabetes Father     Hypertension Mother     Heart disease Mother        Meds/Allergies     Allergies   Allergen Reactions    Amoxicillin Hives       Current Outpatient Medications:     acetaminophen (TYLENOL) 325 mg tablet, Take 3 tablets (975 mg total) by mouth every 8 (eight) hours (Patient taking differently: Take 975 mg by mouth as needed ), Disp: , Rfl: 0    amLODIPine (NORVASC) 10 mg tablet, Take 1 tablet by mouth once daily, Disp: 90 tablet, Rfl: 0    aspirin (ECOTRIN LOW STRENGTH) 81 mg EC tablet, Take 1 tablet (81 mg total) by mouth daily, Disp: 30 tablet, Rfl: 0    atorvastatin (LIPITOR) 80 mg tablet, Take 1 tablet (80 mg total) by mouth daily (Patient taking differently: Take 80 mg by mouth daily at bedtime ), Disp: 90 tablet, Rfl: 2    carvedilol (COREG) 6 25 mg tablet, Take 1 tablet (6 25 mg total) by mouth 2 (two) times a day with meals, Disp: 120 tablet, Rfl: 2    glimepiride (AMARYL) 4 mg tablet, Take 1 tablet (4 mg total) by mouth daily with breakfast, Disp: 90 tablet, Rfl: 1    lisinopril (ZESTRIL) 10 mg tablet, Take 1 tablet (10 mg total) by mouth daily, Disp: 90 tablet, Rfl: 1    metFORMIN (GLUCOPHAGE) 1000 MG tablet, Take 1 tablet (1,000 mg total) by mouth 2 (two) times a day with meals, Disp: 180 tablet, Rfl: 1    Omega-3 Fatty Acids (fish oil) 1,000 mg, Take 1 capsule (1,000 mg total) by mouth 2 (two) times a day, Disp: 180 capsule, Rfl: 2    oxyCODONE (ROXICODONE) 5 mg immediate release tablet, Take 1 tablet (5 mg total) by mouth every 6 (six) hours as needed for moderate pain for up to 12 dosesMax Daily Amount: 20 mg, Disp: 12 tablet, Rfl: 0    Ticagrelor (BRILINTA PO), Take by mouth every 12 (twelve) hours , Disp: , Rfl:     Vitals:  /82 mmHg  HR 70/Min  BP Readings from Last 3 Encounters:   21 150/84   21 117/65   21 115/58       Physical Exam:    Neurologic:  Alert & oriented x 3, no new focal deficits, Not in any acute distress,  Constitutional:  Morbidly obese  Eyes:  Pupil equal and reacting to light, conjunctiva normal,   HENT:  Atraumatic, oropharynx moist, Neck- normal range of motion, no tenderness,  Neck supple, No JVP, No LNP   Respiratory:  Bilateral air entry, mostly clear to auscultation  Cardiovascular: S1-S2 regular with a I/VI ejection systolic murmur   GI:  Soft, nondistended, normal bowel sounds, nontender, no hepatosplenomegaly appreciated  Musculoskeletal:  no deformities     Skin:  Well hydrated,  Lymphatic:  No lymphadenopathy noted   Extremities:  Trace edema and distal pulses are present        Diagnostic Studies Review Cardio:      EKG:  Normal sinus rhythm, heart rate 70 per minute, inferior infarct of undetermined age      Cardiac testing:   Results for orders placed during the hospital encounter of 21    Echo complete with contrast if indicated    Narrative  [facility] [address]  [phone]    Transthoracic Echocardiogram  2D, M-mode, Doppler, and Color Doppler    Study date:  2021    Patient: Dylan Leone  MR number: [id number]  Account number: [de-identified]  : 1980  Age: 45 years  Gender: Male  Status: Inpatient  Location: Bedside  Height: 66 in  Weight: 369 2 lb  BP: 100/ 58 mmHg    Indications: Assess left ventricular function  Diagnoses: J96 91 - Respiratory failure, unspecified with hypoxia    Sonographer:  SIMEON Lucero  Primary Physician:  Pretty Villegas MD  Referring Physician:  Tracie Donald  Group:  [facility]  Cardiology Fellow: Love Hunter MD  Interpreting Physician:  Carina Ribeiro MD    SUMMARY    PROCEDURE INFORMATION:  This was a technically difficult study  Intravenous contrast ( 0 6 ml Definity/NSS) was administered  LEFT VENTRICLE:  Systolic function was normal  Ejection fraction was estimated to be 65 %  There were no regional wall motion abnormalities  RIGHT VENTRICLE:  The size was normal   Systolic function was normal     HISTORY: PRIOR HISTORY: Respiratory failure with hypoxia, CAD s/p stent, Hyperlipidemia    PROCEDURE: The procedure was performed at the bedside  This was a routine study  The transthoracic approach was used  The study included complete 2D imaging, M-mode, complete spectral Doppler, and color Doppler  The heart rate was 66 bpm,  at the start of the study  Images were obtained from the parasternal, apical, subcostal, and suprasternal notch acoustic windows  Intravenous contrast ( 0 6 ml Definity/NSS) was administered  Echocardiographic views were limited due to  decreased penetration and patient on mechanical ventilator  This was a technically difficult study  LEFT VENTRICLE: Size was normal  Systolic function was normal  Ejection fraction was estimated to be 65 %  There were no regional wall motion abnormalities  Wall thickness was normal  There was no evidence of concentric hypertrophy  DOPPLER: The transmitral flow pattern was normal  The study was not technically sufficient to allow evaluation of LV diastolic function      RIGHT VENTRICLE: The size was normal  Systolic function was normal     LEFT ATRIUM: Size was normal     RIGHT ATRIUM: Size was normal     MITRAL VALVE: Valve structure was normal  There was normal leaflet separation  DOPPLER: The transmitral velocity was within the normal range  There was no evidence for stenosis  There was no significant regurgitation  AORTIC VALVE: The valve was probably trileaflet  Leaflets exhibited normal thickness and normal cuspal separation  DOPPLER: Transaortic velocity was within the normal range  There was no evidence for stenosis  There was no regurgitation  TRICUSPID VALVE: The valve structure was normal  There was normal leaflet separation  DOPPLER: The transtricuspid velocity was within the normal range  There was no evidence for stenosis  There was trace regurgitation  The tricuspid jet  envelope definition was inadequate for estimation of RV systolic pressure  PULMONIC VALVE: Leaflets exhibited normal thickness, no calcification, and normal cuspal separation  DOPPLER: The transpulmonic velocity was within the normal range  There was no evidence for stenosis  There was no significant  regurgitation  PERICARDIUM: There was no pericardial effusion  AORTA: The root exhibited normal size  SYSTEMIC VEINS: IVC: The inferior vena cava was dilated  Respirophasic changes in dimension were absent      SYSTEM MEASUREMENT TABLES    2D  %FS: 40 76 %  Ao Diam: 3 12 cm  EDV(Teich): 125 68 ml  EF(Teich): 71 22 %  ESV(Teich): 36 17 ml  IVSd: 0 96 cm  LA Area: 18 82 cm2  LA Diam: 4 09 cm  LVEDV MOD A4C: 139 97 ml  LVEF MOD A4C: 73 53 %  LVESV MOD A4C: 37 05 ml  LVIDd: 5 13 cm  LVIDs: 3 04 cm  LVLd A4C: 8 89 cm  LVLs A4C: 6 99 cm  LVPWd: 0 97 cm  RA Area: 19 01 cm2  RVIDd: 3 58 cm  SV MOD A4C: 102 92 ml  SV(Teich): 89 51 ml    MM  TAPSE: 2 34 cm    PW  E' Sept: 0 07 m/s  E/E' Sept: 7 73  MV A Flavio: 0 38 m/s  MV Dec Sully: 2 89 m/s2  MV DecT: 197 93 ms  MV E Flavio: 0 57 m/s  MV E/A Ratio: 1 5  MV PHT: 57 4 ms  MVA By PHT: 3 83 cm2    [facility]    Prepared and electronically signed by    Chayo Andrade MD  Signed 2021 10:12:03    No results found for this or any previous visit  Results for orders placed during the hospital encounter of 20    Cardiac catheterization    Narrative  [facility] [address]  [address]  [phone]    [facility]    Invasive Cardiovascular Lab Complete Report    Patient: Francis Vigil  MR number: [id number]  Account number: [de-identified]  Study date: 2020  Gender: Male  : 1980  Height: 68 9 in  Weight: 374 lb  BSA: 2 69 mï¾²    Allergies: AMOXICILLIN    Diagnostic Cardiologist:  Che Olsen DO  Interventional Cardiologist:  Che Olsen DO    SUMMARY    CORONARY CIRCULATION:  There was 1-vessel coronary artery disease  Mid RCA: There was a 80 % stenosis  1ST LESION INTERVENTIONS:  A balloon angioplasty with stent procedure was performed on the 80 % lesion in the mid RCA  Following intervention there was a 0 % residual stenosis  A Resolute [place] Rx 3 0 x 12mm drug-eluting stent was placed across the lesion and deployed at a maximum inflation pressure of 14 sarah  INDICATIONS:  --  Possible CAD: unstable angina  PROCEDURES PERFORMED    --  Left coronary angiography  --  Right coronary angiography  --  Inpatient  --  Mod Sedation Same Physician Initial 15min  --  Mod Sedation Same Physician Add 15min  --  Coronary Catheterization (w/o LHC)  --  Mod Sedation Same Physician Add 15min  --  Coronary Drug Eluting Stent w/PTCA  --  Intervention on mid RCA: balloon angioplasty, stent  PROCEDURE: The risks and alternatives of the procedures and conscious sedation were explained to the patient and informed consent was obtained  The patient was brought to the cath lab and placed on the table  The planned puncture sites  were prepped and draped in the usual sterile fashion  --  Right radial artery access   After performing an Ravi's test to verify adequate ulnar artery supply to the hand, the radial site was prepped  The puncture site was infiltrated with local anesthetic  The vessel was accessed using the  modified Seldinger technique, a wire was advanced into the vessel, and a sheath was advanced over the wire into the vessel  --  Left coronary artery angiography  A catheter was advanced over a guidewire into the aorta and positioned in the left coronary artery ostium under fluoroscopic guidance  Angiography was performed  --  Right coronary artery angiography  A catheter was advanced over a guidewire into the aorta and positioned in the right coronary artery ostium under fluoroscopic guidance  Angiography was performed  --  Inpatient  --  Mod Sedation Same Physician Initial 15min  --  Mod Sedation Same Physician Add 15min  --  Coronary Catheterization (w/o LHC)  --  Mod Sedation Same Physician Add 15min  LESION INTERVENTION: A balloon angioplasty with stent procedure was performed on the 80 % lesion in the mid RCA  Following intervention there was a 0 % residual stenosis  There was HERNAN 3 flow before the procedure and HERNAN 3 flow after the  procedure  --  Vessel setup was performed  A 6Fr  Launcher AR1 guiding catheter was used to cannulate the vessel  --  Vessel setup was performed  A Runthrough NS 180cm wire was used to cross the lesion  --  Balloon angioplasty was performed, using a Trek Rx 2 5 x 12mm balloon, with 2 inflations and a maximum inflation pressure of 12 sarah  --  A Resolute [place] Rx 3 0 x 12mm drug-eluting stent was placed across the lesion and deployed at a maximum inflation pressure of 14 sarah  INTERVENTIONS:  --  Coronary Drug Eluting Stent w/PTCA  PROCEDURE COMPLETION: The patient tolerated the procedure well and was discharged from the cath lab  TIMING: Test started at 11:08  Test concluded at 11:49  HEMOSTASIS: The sheath was removed   The site was compressed with a Hemoband  device  Hemostasis was obtained  MEDICATIONS GIVEN: Versed (2mg/2ml), 2 mg, IV, at 11:02  Fentanyl (1OOmcg/2 ml), 50 mcg, IV, at 11:02  1% Lidocaine, 1 ml, subcutaneously, at 11:09  Nitroglycerin (200mcg/ml), 200 mcg, at 11:13  Verapamil  (5mg/2ml), 2 5 mg, IV, at 11:14  Heparin 1000 units/ml, 4,000 units, IV, at 11:14  Fentanyl (1OOmcg/2 ml), 50 mcg, IV, at 11:14  Versed (2mg/2ml), 1 mg, IV, at 11:14  Ticagrelor, 180 mg, PO, at 11:28  Heparin 1000 units/ml, 8,000 units,  IV, at 11:29  Versed (2mg/2ml), 1 mg, IV, at 11:39  Fentanyl (1OOmcg/2 ml), 25 mcg, IV, at 11:39  Fentanyl (1OOmcg/2 ml), 25 mcg, IV, at 11:41  Heparin 1000 units/ml, 2,000 units, IV, at 11:49  CONTRAST GIVEN: 130 ml Omnipaque (350 mg I  /ml)  RADIATION EXPOSURE: Fluoroscopy time: 9 95 min  CORONARY VESSELS:   --  The coronary circulation is right dominant  --  There was 1-vessel coronary artery disease  --  Left main: Angiography showed minor luminal irregularities  --  LAD: Angiography showed minor luminal irregularities  --  Mid circumflex: There was a 30 % stenosis  --  RCA: The vessel was excessively tortuous  --  Proximal RCA: There was a 30 % stenosis  --  Mid RCA: There was a 80 % stenosis  --  Distal RCA: There was a 40 % stenosis  IMPRESSIONS:  1V CAD/RCA with SABINE placement mid RCA    RECOMMENDATIONS  weight loss, smoking cessation, DM control, dapt x 1 year, gdmt cad    DISPOSITION:  The patient left the catheterization laboratory in stable condition  Prepared and signed by    Long Hummel DO  Signed 12/01/2020 11:54:00    Study diagram    Angiographic findings  Native coronary lesions:  ï¾·Mid circumflex: Lesion 1: 30 % stenosis  ï¾·Proximal RCA: Lesion 1: 30 % stenosis  ï¾·Mid RCA: Lesion 1: 80 % stenosis  ï¾·Distal RCA: Lesion 1: 40 % stenosis  Intervention results  Native coronary lesions:  ï¾·balloon angioplasty and stent of the 80 % stenosis in mid RCA  0 % residual stenosis   Stent: [place] Rx 3 0 x 12mm drug-eluting  Hemodynamic tables    Pressures:  Baseline  Pressures:  - HR: 77  Pressures:  - Rhythm:  Pressures:  -- Aortic Pressure (S/D/M): 160/100/126    Outputs:  Baseline  Outputs:  -- CALCULATIONS: [age]  Outputs:  -- CALCULATIONS: Body Surface Area: 2 69  Outputs:  -- CALCULATIONS: Height in cm: 175 00  Outputs:  -- CALCULATIONS: Sex: Male  Outputs:  -- CALCULATIONS: Weight in k 00        Imaging:  Chest X-Ray:   No Chest XR results available for this patient  CT-scan of the chest:     CTA chest wo w contrast    Result Date: 2021  Impression With moderate compromise by respiratory motion, particularly in the upper lungs, no definite acute pulmonary embolus  Nonenhancing peripheral wedge-shaped area of groundglass opacity in the right lower lobe which has the appearance of a pulmonary infarct  Complete left lower lobe atelectasis and partial right lower lobe and right middle lobe atelectasis  Mild coronary artery calcification compatible with known coronary artery disease    I personally discussed this study with Franny Marquez on 2021 at 8:38 AM  Workstation performed: [id number]     Lab Review   Lab Results   Component Value Date    WBC 10 58 (H) 2021    HGB 8 9 (L) 2021    HCT 30 7 (L) 2021    MCV 84 2021    RDW 16 5 (H) 2021     (H) 2021     BMP:  Lab Results   Component Value Date    SODIUM 137 2021    K 4 7 2021     2021    CO2 30 2021    BUN 24 2021    CREATININE 0 99 2021    GLUC 135 2021    GLUF 144 (H) 2021    CALCIUM 9 3 2021    CORRECTEDCA 9 6 2021    EGFR 95 2021    MG 2 2 2021     LFT:  Lab Results   Component Value Date    AST 21 2021    ALT 22 2021    ALKPHOS 99 2021    TP 7 8 2021    ALB 2 4 (L) 2021      No components found for: TSH3  No results found for: [facility]  Lab Results Component Value Date    HGBA1C 7 7 (H) 03/31/2021     Lipid Profile:   Lab Results   Component Value Date    CHOLESTEROL 195 11/29/2020    HDL 29 (L) 11/29/2020    LDLCALC 134 (H) 11/29/2020    TRIG 161 (H) 11/29/2020     Lab Results   Component Value Date    CHOLESTEROL 195 11/29/2020     Lab Results   Component Value Date    TROPONINI <0 02 05/26/2021     Lab Results   Component Value Date    NTBNP 32 04/06/2021      No results found for this or any previous visit (from the past 672 hour(s))  Dr Kerri Vargas MD, [facility]      "This note has been constructed using a voice recognition system  Therefore there may be syntax, spelling, and/or grammatical errors   Please call if you have any questions  "

## 2021-10-21 ENCOUNTER — OFFICE VISIT (OUTPATIENT)
Dept: SURGERY | Facility: CLINIC | Age: 41
End: 2021-10-21

## 2021-10-21 VITALS — TEMPERATURE: 97.6 F | HEIGHT: 67 IN | BODY MASS INDEX: 49.44 KG/M2 | WEIGHT: 315 LBS

## 2021-10-21 DIAGNOSIS — S71.102D OPEN THIGH WOUND, LEFT, SUBSEQUENT ENCOUNTER: ICD-10-CM

## 2021-10-21 DIAGNOSIS — S81.802D OPEN WOUND OF LEFT LOWER EXTREMITY, SUBSEQUENT ENCOUNTER: Primary | ICD-10-CM

## 2021-10-21 PROCEDURE — 3008F BODY MASS INDEX DOCD: CPT | Performed by: INTERNAL MEDICINE

## 2021-10-21 PROCEDURE — 99024 POSTOP FOLLOW-UP VISIT: CPT | Performed by: SURGERY

## 2021-11-01 DIAGNOSIS — I10 ESSENTIAL HYPERTENSION: ICD-10-CM

## 2021-11-04 RX ORDER — LISINOPRIL 10 MG/1
TABLET ORAL
Qty: 90 TABLET | Refills: 0 | Status: SHIPPED | OUTPATIENT
Start: 2021-11-04 | End: 2021-11-05 | Stop reason: SDUPTHER

## 2021-11-05 DIAGNOSIS — I10 ESSENTIAL HYPERTENSION: ICD-10-CM

## 2021-11-05 PROCEDURE — 4010F ACE/ARB THERAPY RXD/TAKEN: CPT | Performed by: FAMILY MEDICINE

## 2021-11-05 RX ORDER — LISINOPRIL 10 MG/1
10 TABLET ORAL DAILY
Qty: 90 TABLET | Refills: 0 | Status: SHIPPED | OUTPATIENT
Start: 2021-11-05 | End: 2022-04-20

## 2021-11-08 ENCOUNTER — TELEPHONE (OUTPATIENT)
Dept: FAMILY MEDICINE CLINIC | Facility: CLINIC | Age: 41
End: 2021-11-08

## 2021-11-15 ENCOUNTER — OFFICE VISIT (OUTPATIENT)
Dept: FAMILY MEDICINE CLINIC | Facility: CLINIC | Age: 41
End: 2021-11-15
Payer: COMMERCIAL

## 2021-11-15 VITALS
OXYGEN SATURATION: 93 % | SYSTOLIC BLOOD PRESSURE: 150 MMHG | HEART RATE: 100 BPM | RESPIRATION RATE: 18 BRPM | BODY MASS INDEX: 49.44 KG/M2 | TEMPERATURE: 98.1 F | WEIGHT: 315 LBS | DIASTOLIC BLOOD PRESSURE: 82 MMHG | HEIGHT: 67 IN

## 2021-11-15 DIAGNOSIS — E66.1 CLASS 3 DRUG-INDUCED OBESITY WITH SERIOUS COMORBIDITY AND BODY MASS INDEX (BMI) OF 60.0 TO 69.9 IN ADULT (HCC): ICD-10-CM

## 2021-11-15 DIAGNOSIS — E11.42 DM TYPE 2 WITH DIABETIC PERIPHERAL NEUROPATHY (HCC): ICD-10-CM

## 2021-11-15 DIAGNOSIS — Z11.4 SCREENING FOR HIV (HUMAN IMMUNODEFICIENCY VIRUS): ICD-10-CM

## 2021-11-15 DIAGNOSIS — Z23 ENCOUNTER FOR IMMUNIZATION: ICD-10-CM

## 2021-11-15 DIAGNOSIS — Z00.00 ANNUAL PHYSICAL EXAM: Primary | ICD-10-CM

## 2021-11-15 DIAGNOSIS — Z28.21 REFUSED INFLUENZA VACCINE: ICD-10-CM

## 2021-11-15 DIAGNOSIS — R06.00 DYSPNEA ON EXERTION: ICD-10-CM

## 2021-11-15 DIAGNOSIS — Z11.59 NEED FOR HEPATITIS C SCREENING TEST: ICD-10-CM

## 2021-11-15 DIAGNOSIS — R07.89 ATYPICAL CHEST PAIN: ICD-10-CM

## 2021-11-15 LAB
SL AMB POCT GLUCOSE BLD: 177
SL AMB POCT HEMOGLOBIN AIC: 7 (ref ?–6.5)

## 2021-11-15 PROCEDURE — 3079F DIAST BP 80-89 MM HG: CPT | Performed by: FAMILY MEDICINE

## 2021-11-15 PROCEDURE — 99396 PREV VISIT EST AGE 40-64: CPT | Performed by: FAMILY MEDICINE

## 2021-11-15 PROCEDURE — 90715 TDAP VACCINE 7 YRS/> IM: CPT

## 2021-11-15 PROCEDURE — 1036F TOBACCO NON-USER: CPT | Performed by: FAMILY MEDICINE

## 2021-11-15 PROCEDURE — 3051F HG A1C>EQUAL 7.0%<8.0%: CPT | Performed by: FAMILY MEDICINE

## 2021-11-15 PROCEDURE — 82948 REAGENT STRIP/BLOOD GLUCOSE: CPT | Performed by: FAMILY MEDICINE

## 2021-11-15 PROCEDURE — 3725F SCREEN DEPRESSION PERFORMED: CPT | Performed by: FAMILY MEDICINE

## 2021-11-15 PROCEDURE — 3077F SYST BP >= 140 MM HG: CPT | Performed by: FAMILY MEDICINE

## 2021-11-15 PROCEDURE — 83036 HEMOGLOBIN GLYCOSYLATED A1C: CPT | Performed by: FAMILY MEDICINE

## 2021-11-15 PROCEDURE — 90471 IMMUNIZATION ADMIN: CPT

## 2021-11-15 PROCEDURE — 3008F BODY MASS INDEX DOCD: CPT | Performed by: FAMILY MEDICINE

## 2021-12-02 ENCOUNTER — APPOINTMENT (EMERGENCY)
Dept: RADIOLOGY | Facility: HOSPITAL | Age: 41
DRG: 603 | End: 2021-12-02
Payer: COMMERCIAL

## 2021-12-02 ENCOUNTER — APPOINTMENT (EMERGENCY)
Dept: RADIOLOGY | Facility: HOSPITAL | Age: 41
DRG: 603 | End: 2021-12-02
Attending: EMERGENCY MEDICINE
Payer: COMMERCIAL

## 2021-12-02 ENCOUNTER — HOSPITAL ENCOUNTER (INPATIENT)
Facility: HOSPITAL | Age: 41
LOS: 3 days | Discharge: HOME | DRG: 603 | End: 2021-12-05
Attending: EMERGENCY MEDICINE | Admitting: FAMILY MEDICINE
Payer: COMMERCIAL

## 2021-12-02 DIAGNOSIS — L03.116 CELLULITIS OF LEFT LOWER EXTREMITY: ICD-10-CM

## 2021-12-02 DIAGNOSIS — L03.319: ICD-10-CM

## 2021-12-02 DIAGNOSIS — M79.662 PAIN AND SWELLING OF LEFT LOWER LEG: ICD-10-CM

## 2021-12-02 DIAGNOSIS — E66.01 MORBID OBESITY WITH BMI OF 60.0-69.9, ADULT (HCC): ICD-10-CM

## 2021-12-02 DIAGNOSIS — L03.311 ABDOMINAL WALL CELLULITIS: Primary | ICD-10-CM

## 2021-12-02 DIAGNOSIS — E66.01 MORBID OBESITY (HCC): ICD-10-CM

## 2021-12-02 DIAGNOSIS — I10 ESSENTIAL HYPERTENSION: ICD-10-CM

## 2021-12-02 DIAGNOSIS — R07.9 CHEST PAIN: ICD-10-CM

## 2021-12-02 DIAGNOSIS — R06.02 SHORTNESS OF BREATH: ICD-10-CM

## 2021-12-02 DIAGNOSIS — M79.89 PAIN AND SWELLING OF LEFT LOWER LEG: ICD-10-CM

## 2021-12-02 DIAGNOSIS — G47.34 NOCTURNAL HYPOXIA: ICD-10-CM

## 2021-12-02 DIAGNOSIS — L02.416 ABSCESS OF LEFT THIGH: ICD-10-CM

## 2021-12-02 DIAGNOSIS — L30.4 INTERTRIGO: ICD-10-CM

## 2021-12-02 DIAGNOSIS — E11.9 DIABETES (HCC): ICD-10-CM

## 2021-12-02 DIAGNOSIS — N17.9 AKI (ACUTE KIDNEY INJURY) (HCC): ICD-10-CM

## 2021-12-02 PROBLEM — E11.42 TYPE 2 DIABETES MELLITUS WITH DIABETIC POLYNEUROPATHY, WITHOUT LONG-TERM CURRENT USE OF INSULIN (HCC): Status: ACTIVE | Noted: 2018-07-26

## 2021-12-02 LAB
2HR DELTA HS TROPONIN: -4 NG/L
4HR DELTA HS TROPONIN: -5 NG/L
ALBUMIN SERPL BCP-MCNC: 2.3 G/DL (ref 3.5–5)
ALP SERPL-CCNC: 122 U/L (ref 46–116)
ALT SERPL W P-5'-P-CCNC: 24 U/L (ref 12–78)
ANION GAP SERPL CALCULATED.3IONS-SCNC: 7 MMOL/L (ref 4–13)
APTT PPP: 35 SECONDS (ref 23–37)
AST SERPL W P-5'-P-CCNC: 28 U/L (ref 5–45)
BASOPHILS # BLD AUTO: 0.02 THOUSANDS/ΜL (ref 0–0.1)
BASOPHILS NFR BLD AUTO: 0 % (ref 0–1)
BILIRUB SERPL-MCNC: 0.23 MG/DL (ref 0.2–1)
BUN SERPL-MCNC: 35 MG/DL (ref 5–25)
CALCIUM ALBUM COR SERPL-MCNC: 9.9 MG/DL (ref 8.3–10.1)
CALCIUM SERPL-MCNC: 8.5 MG/DL (ref 8.3–10.1)
CARDIAC TROPONIN I PNL SERPL HS: 37 NG/L
CARDIAC TROPONIN I PNL SERPL HS: 38 NG/L
CARDIAC TROPONIN I PNL SERPL HS: 42 NG/L
CHLORIDE SERPL-SCNC: 102 MMOL/L (ref 100–108)
CO2 SERPL-SCNC: 28 MMOL/L (ref 21–32)
CREAT SERPL-MCNC: 1.58 MG/DL (ref 0.6–1.3)
EOSINOPHIL # BLD AUTO: 0.17 THOUSAND/ΜL (ref 0–0.61)
EOSINOPHIL NFR BLD AUTO: 2 % (ref 0–6)
ERYTHROCYTE [DISTWIDTH] IN BLOOD BY AUTOMATED COUNT: 15.6 % (ref 11.6–15.1)
FLUAV RNA RESP QL NAA+PROBE: NEGATIVE
FLUBV RNA RESP QL NAA+PROBE: NEGATIVE
GFR SERPL CREATININE-BSD FRML MDRD: 54 ML/MIN/1.73SQ M
GLUCOSE SERPL-MCNC: 103 MG/DL (ref 65–140)
GLUCOSE SERPL-MCNC: 84 MG/DL (ref 65–140)
GLUCOSE SERPL-MCNC: 88 MG/DL (ref 65–140)
HCT VFR BLD AUTO: 32.3 % (ref 36.5–49.3)
HGB BLD-MCNC: 9.2 G/DL (ref 12–17)
IMM GRANULOCYTES # BLD AUTO: 0.05 THOUSAND/UL (ref 0–0.2)
IMM GRANULOCYTES NFR BLD AUTO: 1 % (ref 0–2)
INR PPP: 0.97 (ref 0.84–1.19)
LACTATE SERPL-SCNC: 1 MMOL/L (ref 0.5–2)
LYMPHOCYTES # BLD AUTO: 1.15 THOUSANDS/ΜL (ref 0.6–4.47)
LYMPHOCYTES NFR BLD AUTO: 15 % (ref 14–44)
MAGNESIUM SERPL-MCNC: 2 MG/DL (ref 1.6–2.6)
MCH RBC QN AUTO: 22.8 PG (ref 26.8–34.3)
MCHC RBC AUTO-ENTMCNC: 28.5 G/DL (ref 31.4–37.4)
MCV RBC AUTO: 80 FL (ref 82–98)
MONOCYTES # BLD AUTO: 0.69 THOUSAND/ΜL (ref 0.17–1.22)
MONOCYTES NFR BLD AUTO: 9 % (ref 4–12)
NEUTROPHILS # BLD AUTO: 5.7 THOUSANDS/ΜL (ref 1.85–7.62)
NEUTS SEG NFR BLD AUTO: 73 % (ref 43–75)
NRBC BLD AUTO-RTO: 0 /100 WBCS
NT-PROBNP SERPL-MCNC: 203 PG/ML
PLATELET # BLD AUTO: 303 THOUSANDS/UL (ref 149–390)
PMV BLD AUTO: 9.5 FL (ref 8.9–12.7)
POTASSIUM SERPL-SCNC: 4 MMOL/L (ref 3.5–5.3)
PROT SERPL-MCNC: 8.1 G/DL (ref 6.4–8.2)
PROTHROMBIN TIME: 12.7 SECONDS (ref 11.6–14.5)
RBC # BLD AUTO: 4.04 MILLION/UL (ref 3.88–5.62)
RSV RNA RESP QL NAA+PROBE: NEGATIVE
SARS-COV-2 RNA RESP QL NAA+PROBE: NEGATIVE
SODIUM SERPL-SCNC: 137 MMOL/L (ref 136–145)
WBC # BLD AUTO: 7.78 THOUSAND/UL (ref 4.31–10.16)

## 2021-12-02 PROCEDURE — 83605 ASSAY OF LACTIC ACID: CPT | Performed by: EMERGENCY MEDICINE

## 2021-12-02 PROCEDURE — 84484 ASSAY OF TROPONIN QUANT: CPT | Performed by: EMERGENCY MEDICINE

## 2021-12-02 PROCEDURE — 80053 COMPREHEN METABOLIC PANEL: CPT | Performed by: EMERGENCY MEDICINE

## 2021-12-02 PROCEDURE — 93971 EXTREMITY STUDY: CPT

## 2021-12-02 PROCEDURE — 99285 EMERGENCY DEPT VISIT HI MDM: CPT | Performed by: EMERGENCY MEDICINE

## 2021-12-02 PROCEDURE — 99223 1ST HOSP IP/OBS HIGH 75: CPT | Performed by: FAMILY MEDICINE

## 2021-12-02 PROCEDURE — 96361 HYDRATE IV INFUSION ADD-ON: CPT

## 2021-12-02 PROCEDURE — 0241U HB NFCT DS VIR RESP RNA 4 TRGT: CPT | Performed by: EMERGENCY MEDICINE

## 2021-12-02 PROCEDURE — 82948 REAGENT STRIP/BLOOD GLUCOSE: CPT

## 2021-12-02 PROCEDURE — 96375 TX/PRO/DX INJ NEW DRUG ADDON: CPT

## 2021-12-02 PROCEDURE — 71275 CT ANGIOGRAPHY CHEST: CPT

## 2021-12-02 PROCEDURE — 36415 COLL VENOUS BLD VENIPUNCTURE: CPT | Performed by: EMERGENCY MEDICINE

## 2021-12-02 PROCEDURE — 93971 EXTREMITY STUDY: CPT | Performed by: SURGERY

## 2021-12-02 PROCEDURE — 93005 ELECTROCARDIOGRAM TRACING: CPT

## 2021-12-02 PROCEDURE — 87040 BLOOD CULTURE FOR BACTERIA: CPT | Performed by: EMERGENCY MEDICINE

## 2021-12-02 PROCEDURE — 85610 PROTHROMBIN TIME: CPT | Performed by: EMERGENCY MEDICINE

## 2021-12-02 PROCEDURE — 96374 THER/PROPH/DIAG INJ IV PUSH: CPT

## 2021-12-02 PROCEDURE — 83735 ASSAY OF MAGNESIUM: CPT | Performed by: EMERGENCY MEDICINE

## 2021-12-02 PROCEDURE — 99285 EMERGENCY DEPT VISIT HI MDM: CPT

## 2021-12-02 PROCEDURE — 3066F NEPHROPATHY DOC TX: CPT | Performed by: FAMILY MEDICINE

## 2021-12-02 PROCEDURE — 85025 COMPLETE CBC W/AUTO DIFF WBC: CPT | Performed by: EMERGENCY MEDICINE

## 2021-12-02 PROCEDURE — G1004 CDSM NDSC: HCPCS

## 2021-12-02 PROCEDURE — 84484 ASSAY OF TROPONIN QUANT: CPT | Performed by: STUDENT IN AN ORGANIZED HEALTH CARE EDUCATION/TRAINING PROGRAM

## 2021-12-02 PROCEDURE — 74177 CT ABD & PELVIS W/CONTRAST: CPT

## 2021-12-02 PROCEDURE — 83880 ASSAY OF NATRIURETIC PEPTIDE: CPT | Performed by: EMERGENCY MEDICINE

## 2021-12-02 PROCEDURE — 85730 THROMBOPLASTIN TIME PARTIAL: CPT | Performed by: EMERGENCY MEDICINE

## 2021-12-02 RX ORDER — SODIUM CHLORIDE 9 MG/ML
100 INJECTION, SOLUTION INTRAVENOUS CONTINUOUS
Status: DISCONTINUED | OUTPATIENT
Start: 2021-12-02 | End: 2021-12-03

## 2021-12-02 RX ORDER — ATORVASTATIN CALCIUM 80 MG/1
80 TABLET, FILM COATED ORAL
Status: DISCONTINUED | OUTPATIENT
Start: 2021-12-02 | End: 2021-12-05 | Stop reason: HOSPADM

## 2021-12-02 RX ORDER — CEFAZOLIN SODIUM 2 G/50ML
2000 SOLUTION INTRAVENOUS EVERY 8 HOURS
Status: DISCONTINUED | OUTPATIENT
Start: 2021-12-03 | End: 2021-12-05 | Stop reason: HOSPADM

## 2021-12-02 RX ORDER — ACETAMINOPHEN 325 MG/1
650 TABLET ORAL EVERY 6 HOURS PRN
Status: DISCONTINUED | OUTPATIENT
Start: 2021-12-02 | End: 2021-12-05 | Stop reason: HOSPADM

## 2021-12-02 RX ORDER — CEFTRIAXONE 2 G/50ML
2000 INJECTION, SOLUTION INTRAVENOUS ONCE
Status: COMPLETED | OUTPATIENT
Start: 2021-12-02 | End: 2021-12-02

## 2021-12-02 RX ORDER — AMLODIPINE BESYLATE 10 MG/1
10 TABLET ORAL DAILY
Status: DISCONTINUED | OUTPATIENT
Start: 2021-12-03 | End: 2021-12-05 | Stop reason: HOSPADM

## 2021-12-02 RX ORDER — HEPARIN SODIUM 5000 [USP'U]/ML
5000 INJECTION, SOLUTION INTRAVENOUS; SUBCUTANEOUS EVERY 8 HOURS SCHEDULED
Status: DISCONTINUED | OUTPATIENT
Start: 2021-12-02 | End: 2021-12-05 | Stop reason: HOSPADM

## 2021-12-02 RX ORDER — CHLORAL HYDRATE 500 MG
1000 CAPSULE ORAL 2 TIMES DAILY
Status: DISCONTINUED | OUTPATIENT
Start: 2021-12-02 | End: 2021-12-05 | Stop reason: HOSPADM

## 2021-12-02 RX ORDER — HYDROMORPHONE HCL/PF 1 MG/ML
1 SYRINGE (ML) INJECTION ONCE
Status: COMPLETED | OUTPATIENT
Start: 2021-12-02 | End: 2021-12-02

## 2021-12-02 RX ORDER — SACCHAROMYCES BOULARDII 250 MG
250 CAPSULE ORAL 2 TIMES DAILY
Status: DISCONTINUED | OUTPATIENT
Start: 2021-12-02 | End: 2021-12-05 | Stop reason: HOSPADM

## 2021-12-02 RX ORDER — ASPIRIN 81 MG/1
324 TABLET, CHEWABLE ORAL ONCE
Status: COMPLETED | OUTPATIENT
Start: 2021-12-02 | End: 2021-12-02

## 2021-12-02 RX ORDER — ASPIRIN 81 MG/1
81 TABLET ORAL DAILY
Status: DISCONTINUED | OUTPATIENT
Start: 2021-12-03 | End: 2021-12-05 | Stop reason: HOSPADM

## 2021-12-02 RX ORDER — VANCOMYCIN HYDROCHLORIDE 1 G/200ML
15 INJECTION, SOLUTION INTRAVENOUS ONCE
Status: COMPLETED | OUTPATIENT
Start: 2021-12-02 | End: 2021-12-02

## 2021-12-02 RX ORDER — MORPHINE SULFATE 4 MG/ML
4 INJECTION, SOLUTION INTRAMUSCULAR; INTRAVENOUS ONCE
Status: COMPLETED | OUTPATIENT
Start: 2021-12-02 | End: 2021-12-02

## 2021-12-02 RX ORDER — ONDANSETRON 2 MG/ML
4 INJECTION INTRAMUSCULAR; INTRAVENOUS ONCE
Status: COMPLETED | OUTPATIENT
Start: 2021-12-02 | End: 2021-12-02

## 2021-12-02 RX ORDER — CARVEDILOL 6.25 MG/1
6.25 TABLET ORAL 2 TIMES DAILY WITH MEALS
Status: DISCONTINUED | OUTPATIENT
Start: 2021-12-02 | End: 2021-12-05 | Stop reason: HOSPADM

## 2021-12-02 RX ORDER — NYSTATIN 100000 [USP'U]/G
POWDER TOPICAL 2 TIMES DAILY
Status: DISCONTINUED | OUTPATIENT
Start: 2021-12-02 | End: 2021-12-05 | Stop reason: HOSPADM

## 2021-12-02 RX ADMIN — IOHEXOL 100 ML: 350 INJECTION, SOLUTION INTRAVENOUS at 15:37

## 2021-12-02 RX ADMIN — CARVEDILOL 6.25 MG: 6.25 TABLET, FILM COATED ORAL at 20:18

## 2021-12-02 RX ADMIN — TICAGRELOR 90 MG: 90 TABLET ORAL at 20:19

## 2021-12-02 RX ADMIN — SODIUM CHLORIDE 500 ML: 0.9 INJECTION, SOLUTION INTRAVENOUS at 13:51

## 2021-12-02 RX ADMIN — HEPARIN SODIUM 5000 UNITS: 5000 INJECTION INTRAVENOUS; SUBCUTANEOUS at 22:44

## 2021-12-02 RX ADMIN — Medication 250 MG: at 20:20

## 2021-12-02 RX ADMIN — HYDROMORPHONE HYDROCHLORIDE 1 MG: 1 INJECTION, SOLUTION INTRAMUSCULAR; INTRAVENOUS; SUBCUTANEOUS at 21:12

## 2021-12-02 RX ADMIN — HYDROMORPHONE HYDROCHLORIDE 1 MG: 1 INJECTION, SOLUTION INTRAMUSCULAR; INTRAVENOUS; SUBCUTANEOUS at 17:24

## 2021-12-02 RX ADMIN — SODIUM CHLORIDE 150 ML/HR: 0.9 INJECTION, SOLUTION INTRAVENOUS at 20:57

## 2021-12-02 RX ADMIN — CEFTRIAXONE 2000 MG: 2 INJECTION, SOLUTION INTRAVENOUS at 17:30

## 2021-12-02 RX ADMIN — ATORVASTATIN CALCIUM 80 MG: 80 TABLET ORAL at 22:44

## 2021-12-02 RX ADMIN — HYDROMORPHONE HYDROCHLORIDE 1 MG: 1 INJECTION, SOLUTION INTRAMUSCULAR; INTRAVENOUS; SUBCUTANEOUS at 15:24

## 2021-12-02 RX ADMIN — MORPHINE SULFATE 4 MG: 4 INJECTION, SOLUTION INTRAMUSCULAR; INTRAVENOUS at 13:47

## 2021-12-02 RX ADMIN — VANCOMYCIN HYDROCHLORIDE 1000 MG: 1 INJECTION, SOLUTION INTRAVENOUS at 18:29

## 2021-12-02 RX ADMIN — ACETAMINOPHEN 650 MG: 325 TABLET, FILM COATED ORAL at 20:19

## 2021-12-02 RX ADMIN — OMEGA-3 FATTY ACIDS CAP 1000 MG 1000 MG: 1000 CAP at 20:21

## 2021-12-02 RX ADMIN — ONDANSETRON 4 MG: 2 INJECTION INTRAMUSCULAR; INTRAVENOUS at 14:02

## 2021-12-02 RX ADMIN — ASPIRIN 81 MG CHEWABLE TABLET 324 MG: 81 TABLET CHEWABLE at 17:22

## 2021-12-03 LAB
ANION GAP SERPL CALCULATED.3IONS-SCNC: 8 MMOL/L (ref 4–13)
ANION GAP SERPL CALCULATED.3IONS-SCNC: 9 MMOL/L (ref 4–13)
BASOPHILS # BLD AUTO: 0.03 THOUSANDS/ΜL (ref 0–0.1)
BASOPHILS NFR BLD AUTO: 0 % (ref 0–1)
BUN SERPL-MCNC: 36 MG/DL (ref 5–25)
BUN SERPL-MCNC: 38 MG/DL (ref 5–25)
CALCIUM SERPL-MCNC: 8.1 MG/DL (ref 8.3–10.1)
CALCIUM SERPL-MCNC: 8.3 MG/DL (ref 8.3–10.1)
CHLORIDE SERPL-SCNC: 100 MMOL/L (ref 100–108)
CHLORIDE SERPL-SCNC: 101 MMOL/L (ref 100–108)
CO2 SERPL-SCNC: 25 MMOL/L (ref 21–32)
CO2 SERPL-SCNC: 27 MMOL/L (ref 21–32)
CREAT SERPL-MCNC: 1.41 MG/DL (ref 0.6–1.3)
CREAT SERPL-MCNC: 1.72 MG/DL (ref 0.6–1.3)
EOSINOPHIL # BLD AUTO: 0.15 THOUSAND/ΜL (ref 0–0.61)
EOSINOPHIL NFR BLD AUTO: 2 % (ref 0–6)
ERYTHROCYTE [DISTWIDTH] IN BLOOD BY AUTOMATED COUNT: 15.6 % (ref 11.6–15.1)
GFR SERPL CREATININE-BSD FRML MDRD: 49 ML/MIN/1.73SQ M
GFR SERPL CREATININE-BSD FRML MDRD: 62 ML/MIN/1.73SQ M
GLUCOSE SERPL-MCNC: 109 MG/DL (ref 65–140)
GLUCOSE SERPL-MCNC: 119 MG/DL (ref 65–140)
GLUCOSE SERPL-MCNC: 145 MG/DL (ref 65–140)
GLUCOSE SERPL-MCNC: 148 MG/DL (ref 65–140)
GLUCOSE SERPL-MCNC: 154 MG/DL (ref 65–140)
GLUCOSE SERPL-MCNC: 165 MG/DL (ref 65–140)
HCT VFR BLD AUTO: 29.5 % (ref 36.5–49.3)
HGB BLD-MCNC: 8.3 G/DL (ref 12–17)
IMM GRANULOCYTES # BLD AUTO: 0.04 THOUSAND/UL (ref 0–0.2)
IMM GRANULOCYTES NFR BLD AUTO: 1 % (ref 0–2)
LYMPHOCYTES # BLD AUTO: 1.05 THOUSANDS/ΜL (ref 0.6–4.47)
LYMPHOCYTES NFR BLD AUTO: 15 % (ref 14–44)
MAGNESIUM SERPL-MCNC: 2.1 MG/DL (ref 1.6–2.6)
MCH RBC QN AUTO: 22.8 PG (ref 26.8–34.3)
MCHC RBC AUTO-ENTMCNC: 28.1 G/DL (ref 31.4–37.4)
MCV RBC AUTO: 81 FL (ref 82–98)
MONOCYTES # BLD AUTO: 0.65 THOUSAND/ΜL (ref 0.17–1.22)
MONOCYTES NFR BLD AUTO: 10 % (ref 4–12)
NEUTROPHILS # BLD AUTO: 4.93 THOUSANDS/ΜL (ref 1.85–7.62)
NEUTS SEG NFR BLD AUTO: 72 % (ref 43–75)
NRBC BLD AUTO-RTO: 0 /100 WBCS
PHOSPHATE SERPL-MCNC: 4.9 MG/DL (ref 2.7–4.5)
PLATELET # BLD AUTO: 266 THOUSANDS/UL (ref 149–390)
PMV BLD AUTO: 9.6 FL (ref 8.9–12.7)
POTASSIUM SERPL-SCNC: 4.2 MMOL/L (ref 3.5–5.3)
POTASSIUM SERPL-SCNC: 4.5 MMOL/L (ref 3.5–5.3)
RBC # BLD AUTO: 3.64 MILLION/UL (ref 3.88–5.62)
SODIUM SERPL-SCNC: 135 MMOL/L (ref 136–145)
SODIUM SERPL-SCNC: 135 MMOL/L (ref 136–145)
WBC # BLD AUTO: 6.85 THOUSAND/UL (ref 4.31–10.16)

## 2021-12-03 PROCEDURE — 0J983ZX DRAINAGE OF ABDOMEN SUBCUTANEOUS TISSUE AND FASCIA, PERCUTANEOUS APPROACH, DIAGNOSTIC: ICD-10-PCS | Performed by: FAMILY MEDICINE

## 2021-12-03 PROCEDURE — 87186 SC STD MICRODIL/AGAR DIL: CPT | Performed by: STUDENT IN AN ORGANIZED HEALTH CARE EDUCATION/TRAINING PROGRAM

## 2021-12-03 PROCEDURE — 84100 ASSAY OF PHOSPHORUS: CPT | Performed by: STUDENT IN AN ORGANIZED HEALTH CARE EDUCATION/TRAINING PROGRAM

## 2021-12-03 PROCEDURE — 87205 SMEAR GRAM STAIN: CPT | Performed by: PHYSICIAN ASSISTANT

## 2021-12-03 PROCEDURE — 87081 CULTURE SCREEN ONLY: CPT | Performed by: STUDENT IN AN ORGANIZED HEALTH CARE EDUCATION/TRAINING PROGRAM

## 2021-12-03 PROCEDURE — 80048 BASIC METABOLIC PNL TOTAL CA: CPT | Performed by: STUDENT IN AN ORGANIZED HEALTH CARE EDUCATION/TRAINING PROGRAM

## 2021-12-03 PROCEDURE — 99233 SBSQ HOSP IP/OBS HIGH 50: CPT | Performed by: SPECIALIST

## 2021-12-03 PROCEDURE — 99232 SBSQ HOSP IP/OBS MODERATE 35: CPT | Performed by: FAMILY MEDICINE

## 2021-12-03 PROCEDURE — 82948 REAGENT STRIP/BLOOD GLUCOSE: CPT

## 2021-12-03 PROCEDURE — 87205 SMEAR GRAM STAIN: CPT | Performed by: STUDENT IN AN ORGANIZED HEALTH CARE EDUCATION/TRAINING PROGRAM

## 2021-12-03 PROCEDURE — 87147 CULTURE TYPE IMMUNOLOGIC: CPT | Performed by: PHYSICIAN ASSISTANT

## 2021-12-03 PROCEDURE — 87077 CULTURE AEROBIC IDENTIFY: CPT | Performed by: STUDENT IN AN ORGANIZED HEALTH CARE EDUCATION/TRAINING PROGRAM

## 2021-12-03 PROCEDURE — 85025 COMPLETE CBC W/AUTO DIFF WBC: CPT | Performed by: STUDENT IN AN ORGANIZED HEALTH CARE EDUCATION/TRAINING PROGRAM

## 2021-12-03 PROCEDURE — 87070 CULTURE OTHR SPECIMN AEROBIC: CPT | Performed by: PHYSICIAN ASSISTANT

## 2021-12-03 PROCEDURE — 87070 CULTURE OTHR SPECIMN AEROBIC: CPT | Performed by: STUDENT IN AN ORGANIZED HEALTH CARE EDUCATION/TRAINING PROGRAM

## 2021-12-03 PROCEDURE — 83735 ASSAY OF MAGNESIUM: CPT | Performed by: STUDENT IN AN ORGANIZED HEALTH CARE EDUCATION/TRAINING PROGRAM

## 2021-12-03 PROCEDURE — 94760 N-INVAS EAR/PLS OXIMETRY 1: CPT

## 2021-12-03 PROCEDURE — 80048 BASIC METABOLIC PNL TOTAL CA: CPT

## 2021-12-03 PROCEDURE — 10060 I&D ABSCESS SIMPLE/SINGLE: CPT | Performed by: SPECIALIST

## 2021-12-03 RX ORDER — LIDOCAINE HYDROCHLORIDE 10 MG/ML
10 INJECTION, SOLUTION EPIDURAL; INFILTRATION; INTRACAUDAL; PERINEURAL ONCE
Status: COMPLETED | OUTPATIENT
Start: 2021-12-03 | End: 2021-12-03

## 2021-12-03 RX ORDER — SODIUM CHLORIDE 9 MG/ML
150 INJECTION, SOLUTION INTRAVENOUS CONTINUOUS
Status: DISCONTINUED | OUTPATIENT
Start: 2021-12-03 | End: 2021-12-04

## 2021-12-03 RX ORDER — HYDROMORPHONE HCL/PF 1 MG/ML
1 SYRINGE (ML) INJECTION EVERY 4 HOURS PRN
Status: DISCONTINUED | OUTPATIENT
Start: 2021-12-03 | End: 2021-12-04

## 2021-12-03 RX ORDER — HYDROMORPHONE HCL/PF 1 MG/ML
0.5 SYRINGE (ML) INJECTION ONCE
Status: COMPLETED | OUTPATIENT
Start: 2021-12-03 | End: 2021-12-03

## 2021-12-03 RX ADMIN — ASPIRIN 81 MG: 81 TABLET, COATED ORAL at 08:53

## 2021-12-03 RX ADMIN — CARVEDILOL 6.25 MG: 6.25 TABLET, FILM COATED ORAL at 08:53

## 2021-12-03 RX ADMIN — CEFAZOLIN SODIUM 2000 MG: 2 SOLUTION INTRAVENOUS at 22:17

## 2021-12-03 RX ADMIN — ACETAMINOPHEN 650 MG: 325 TABLET, FILM COATED ORAL at 04:20

## 2021-12-03 RX ADMIN — OMEGA-3 FATTY ACIDS CAP 1000 MG 1000 MG: 1000 CAP at 08:53

## 2021-12-03 RX ADMIN — INSULIN LISPRO 2 UNITS: 100 INJECTION, SOLUTION INTRAVENOUS; SUBCUTANEOUS at 22:16

## 2021-12-03 RX ADMIN — Medication 250 MG: at 17:30

## 2021-12-03 RX ADMIN — LIDOCAINE HYDROCHLORIDE 10 ML: 10 INJECTION, SOLUTION EPIDURAL; INFILTRATION; INTRACAUDAL at 12:31

## 2021-12-03 RX ADMIN — CARVEDILOL 6.25 MG: 6.25 TABLET, FILM COATED ORAL at 16:14

## 2021-12-03 RX ADMIN — HYDROMORPHONE HYDROCHLORIDE 1 MG: 1 INJECTION, SOLUTION INTRAMUSCULAR; INTRAVENOUS; SUBCUTANEOUS at 16:10

## 2021-12-03 RX ADMIN — TICAGRELOR 90 MG: 90 TABLET ORAL at 17:30

## 2021-12-03 RX ADMIN — HYDROMORPHONE HYDROCHLORIDE 1 MG: 1 INJECTION, SOLUTION INTRAMUSCULAR; INTRAVENOUS; SUBCUTANEOUS at 10:02

## 2021-12-03 RX ADMIN — INSULIN LISPRO 2 UNITS: 100 INJECTION, SOLUTION INTRAVENOUS; SUBCUTANEOUS at 11:42

## 2021-12-03 RX ADMIN — OMEGA-3 FATTY ACIDS CAP 1000 MG 1000 MG: 1000 CAP at 17:30

## 2021-12-03 RX ADMIN — SODIUM CHLORIDE 150 ML/HR: 0.9 INJECTION, SOLUTION INTRAVENOUS at 22:16

## 2021-12-03 RX ADMIN — Medication 250 MG: at 08:53

## 2021-12-03 RX ADMIN — HYDROMORPHONE HYDROCHLORIDE 0.5 MG: 1 INJECTION, SOLUTION INTRAMUSCULAR; INTRAVENOUS; SUBCUTANEOUS at 12:30

## 2021-12-03 RX ADMIN — HYDROMORPHONE HYDROCHLORIDE 1 MG: 1 INJECTION, SOLUTION INTRAMUSCULAR; INTRAVENOUS; SUBCUTANEOUS at 20:10

## 2021-12-03 RX ADMIN — SODIUM CHLORIDE 150 ML/HR: 0.9 INJECTION, SOLUTION INTRAVENOUS at 16:14

## 2021-12-03 RX ADMIN — TICAGRELOR 90 MG: 90 TABLET ORAL at 07:28

## 2021-12-03 RX ADMIN — HEPARIN SODIUM 5000 UNITS: 5000 INJECTION INTRAVENOUS; SUBCUTANEOUS at 22:17

## 2021-12-03 RX ADMIN — AMLODIPINE BESYLATE 10 MG: 10 TABLET ORAL at 08:53

## 2021-12-03 RX ADMIN — ATORVASTATIN CALCIUM 80 MG: 80 TABLET ORAL at 22:17

## 2021-12-03 RX ADMIN — HEPARIN SODIUM 5000 UNITS: 5000 INJECTION INTRAVENOUS; SUBCUTANEOUS at 06:04

## 2021-12-03 RX ADMIN — CEFAZOLIN SODIUM 2000 MG: 2 SOLUTION INTRAVENOUS at 06:05

## 2021-12-03 RX ADMIN — HEPARIN SODIUM 5000 UNITS: 5000 INJECTION INTRAVENOUS; SUBCUTANEOUS at 13:34

## 2021-12-03 RX ADMIN — CEFAZOLIN SODIUM 2000 MG: 2 SOLUTION INTRAVENOUS at 13:34

## 2021-12-04 PROBLEM — G47.34 NOCTURNAL HYPOXIA: Status: ACTIVE | Noted: 2021-12-04

## 2021-12-04 LAB
ANION GAP SERPL CALCULATED.3IONS-SCNC: 6 MMOL/L (ref 4–13)
ATRIAL RATE: 68 BPM
BASOPHILS # BLD AUTO: 0.03 THOUSANDS/ΜL (ref 0–0.1)
BASOPHILS NFR BLD AUTO: 0 % (ref 0–1)
BUN SERPL-MCNC: 26 MG/DL (ref 5–25)
CALCIUM SERPL-MCNC: 8.4 MG/DL (ref 8.3–10.1)
CHLORIDE SERPL-SCNC: 103 MMOL/L (ref 100–108)
CO2 SERPL-SCNC: 25 MMOL/L (ref 21–32)
CREAT SERPL-MCNC: 1.14 MG/DL (ref 0.6–1.3)
EOSINOPHIL # BLD AUTO: 0.16 THOUSAND/ΜL (ref 0–0.61)
EOSINOPHIL NFR BLD AUTO: 2 % (ref 0–6)
ERYTHROCYTE [DISTWIDTH] IN BLOOD BY AUTOMATED COUNT: 15.6 % (ref 11.6–15.1)
GFR SERPL CREATININE-BSD FRML MDRD: 80 ML/MIN/1.73SQ M
GLUCOSE SERPL-MCNC: 146 MG/DL (ref 65–140)
GLUCOSE SERPL-MCNC: 148 MG/DL (ref 65–140)
GLUCOSE SERPL-MCNC: 161 MG/DL (ref 65–140)
GLUCOSE SERPL-MCNC: 162 MG/DL (ref 65–140)
GLUCOSE SERPL-MCNC: 185 MG/DL (ref 65–140)
HCT VFR BLD AUTO: 30.1 % (ref 36.5–49.3)
HGB BLD-MCNC: 8.7 G/DL (ref 12–17)
IMM GRANULOCYTES # BLD AUTO: 0.06 THOUSAND/UL (ref 0–0.2)
IMM GRANULOCYTES NFR BLD AUTO: 1 % (ref 0–2)
LYMPHOCYTES # BLD AUTO: 1.03 THOUSANDS/ΜL (ref 0.6–4.47)
LYMPHOCYTES NFR BLD AUTO: 14 % (ref 14–44)
MAGNESIUM SERPL-MCNC: 2.1 MG/DL (ref 1.6–2.6)
MCH RBC QN AUTO: 23 PG (ref 26.8–34.3)
MCHC RBC AUTO-ENTMCNC: 28.9 G/DL (ref 31.4–37.4)
MCV RBC AUTO: 80 FL (ref 82–98)
MONOCYTES # BLD AUTO: 0.61 THOUSAND/ΜL (ref 0.17–1.22)
MONOCYTES NFR BLD AUTO: 8 % (ref 4–12)
MRSA NOSE QL CULT: NORMAL
NEUTROPHILS # BLD AUTO: 5.63 THOUSANDS/ΜL (ref 1.85–7.62)
NEUTS SEG NFR BLD AUTO: 75 % (ref 43–75)
NRBC BLD AUTO-RTO: 0 /100 WBCS
P AXIS: 37 DEGREES
PHOSPHATE SERPL-MCNC: 3.2 MG/DL (ref 2.7–4.5)
PLATELET # BLD AUTO: 287 THOUSANDS/UL (ref 149–390)
PMV BLD AUTO: 9.2 FL (ref 8.9–12.7)
POTASSIUM SERPL-SCNC: 4.4 MMOL/L (ref 3.5–5.3)
PR INTERVAL: 160 MS
QRS AXIS: -16 DEGREES
QRSD INTERVAL: 82 MS
QT INTERVAL: 372 MS
QTC INTERVAL: 395 MS
RBC # BLD AUTO: 3.78 MILLION/UL (ref 3.88–5.62)
SODIUM SERPL-SCNC: 134 MMOL/L (ref 136–145)
T WAVE AXIS: 33 DEGREES
VENTRICULAR RATE: 68 BPM
WBC # BLD AUTO: 7.52 THOUSAND/UL (ref 4.31–10.16)

## 2021-12-04 PROCEDURE — 83735 ASSAY OF MAGNESIUM: CPT

## 2021-12-04 PROCEDURE — 94760 N-INVAS EAR/PLS OXIMETRY 1: CPT

## 2021-12-04 PROCEDURE — 82948 REAGENT STRIP/BLOOD GLUCOSE: CPT

## 2021-12-04 PROCEDURE — 85025 COMPLETE CBC W/AUTO DIFF WBC: CPT

## 2021-12-04 PROCEDURE — 93010 ELECTROCARDIOGRAM REPORT: CPT | Performed by: INTERNAL MEDICINE

## 2021-12-04 PROCEDURE — 99232 SBSQ HOSP IP/OBS MODERATE 35: CPT | Performed by: FAMILY MEDICINE

## 2021-12-04 PROCEDURE — 80048 BASIC METABOLIC PNL TOTAL CA: CPT

## 2021-12-04 PROCEDURE — 99024 POSTOP FOLLOW-UP VISIT: CPT | Performed by: SURGERY

## 2021-12-04 PROCEDURE — 84100 ASSAY OF PHOSPHORUS: CPT

## 2021-12-04 PROCEDURE — 94762 N-INVAS EAR/PLS OXIMTRY CONT: CPT

## 2021-12-04 RX ORDER — HYDROMORPHONE HCL/PF 1 MG/ML
1 SYRINGE (ML) INJECTION EVERY 6 HOURS PRN
Status: DISCONTINUED | OUTPATIENT
Start: 2021-12-04 | End: 2021-12-04

## 2021-12-04 RX ORDER — OXYCODONE HYDROCHLORIDE 5 MG/1
5 TABLET ORAL EVERY 4 HOURS PRN
Status: DISCONTINUED | OUTPATIENT
Start: 2021-12-04 | End: 2021-12-05 | Stop reason: HOSPADM

## 2021-12-04 RX ADMIN — NYSTATIN: 100000 POWDER TOPICAL at 18:20

## 2021-12-04 RX ADMIN — OXYCODONE HYDROCHLORIDE 5 MG: 5 TABLET ORAL at 17:02

## 2021-12-04 RX ADMIN — INSULIN LISPRO 2 UNITS: 100 INJECTION, SOLUTION INTRAVENOUS; SUBCUTANEOUS at 21:38

## 2021-12-04 RX ADMIN — HEPARIN SODIUM 5000 UNITS: 5000 INJECTION INTRAVENOUS; SUBCUTANEOUS at 13:35

## 2021-12-04 RX ADMIN — TICAGRELOR 90 MG: 90 TABLET ORAL at 18:21

## 2021-12-04 RX ADMIN — HEPARIN SODIUM 5000 UNITS: 5000 INJECTION INTRAVENOUS; SUBCUTANEOUS at 21:37

## 2021-12-04 RX ADMIN — Medication 250 MG: at 17:02

## 2021-12-04 RX ADMIN — AMLODIPINE BESYLATE 10 MG: 10 TABLET ORAL at 09:06

## 2021-12-04 RX ADMIN — TICAGRELOR 90 MG: 90 TABLET ORAL at 06:51

## 2021-12-04 RX ADMIN — OXYCODONE HYDROCHLORIDE 5 MG: 5 TABLET ORAL at 12:08

## 2021-12-04 RX ADMIN — Medication 250 MG: at 09:06

## 2021-12-04 RX ADMIN — HYDROMORPHONE HYDROCHLORIDE 1 MG: 1 INJECTION, SOLUTION INTRAMUSCULAR; INTRAVENOUS; SUBCUTANEOUS at 00:51

## 2021-12-04 RX ADMIN — OXYCODONE HYDROCHLORIDE 5 MG: 5 TABLET ORAL at 21:20

## 2021-12-04 RX ADMIN — SODIUM CHLORIDE 150 ML/HR: 0.9 INJECTION, SOLUTION INTRAVENOUS at 09:12

## 2021-12-04 RX ADMIN — INSULIN LISPRO 2 UNITS: 100 INJECTION, SOLUTION INTRAVENOUS; SUBCUTANEOUS at 09:07

## 2021-12-04 RX ADMIN — ATORVASTATIN CALCIUM 80 MG: 80 TABLET ORAL at 21:18

## 2021-12-04 RX ADMIN — HEPARIN SODIUM 5000 UNITS: 5000 INJECTION INTRAVENOUS; SUBCUTANEOUS at 06:50

## 2021-12-04 RX ADMIN — CARVEDILOL 6.25 MG: 6.25 TABLET, FILM COATED ORAL at 15:39

## 2021-12-04 RX ADMIN — INSULIN LISPRO 2 UNITS: 100 INJECTION, SOLUTION INTRAVENOUS; SUBCUTANEOUS at 12:06

## 2021-12-04 RX ADMIN — CEFAZOLIN SODIUM 2000 MG: 2 SOLUTION INTRAVENOUS at 06:51

## 2021-12-04 RX ADMIN — OMEGA-3 FATTY ACIDS CAP 1000 MG 1000 MG: 1000 CAP at 17:02

## 2021-12-04 RX ADMIN — HYDROMORPHONE HYDROCHLORIDE 1 MG: 1 INJECTION, SOLUTION INTRAMUSCULAR; INTRAVENOUS; SUBCUTANEOUS at 06:51

## 2021-12-04 RX ADMIN — CEFAZOLIN SODIUM 2000 MG: 2 SOLUTION INTRAVENOUS at 21:21

## 2021-12-04 RX ADMIN — CEFAZOLIN SODIUM 2000 MG: 2 SOLUTION INTRAVENOUS at 13:35

## 2021-12-04 RX ADMIN — ASPIRIN 81 MG: 81 TABLET, COATED ORAL at 09:06

## 2021-12-04 RX ADMIN — CARVEDILOL 6.25 MG: 6.25 TABLET, FILM COATED ORAL at 09:06

## 2021-12-04 RX ADMIN — OMEGA-3 FATTY ACIDS CAP 1000 MG 1000 MG: 1000 CAP at 09:06

## 2021-12-05 VITALS
OXYGEN SATURATION: 94 % | DIASTOLIC BLOOD PRESSURE: 66 MMHG | HEART RATE: 58 BPM | RESPIRATION RATE: 18 BRPM | HEIGHT: 67 IN | SYSTOLIC BLOOD PRESSURE: 140 MMHG | BODY MASS INDEX: 49.44 KG/M2 | TEMPERATURE: 97.7 F | WEIGHT: 315 LBS

## 2021-12-05 DIAGNOSIS — L03.319: Primary | ICD-10-CM

## 2021-12-05 LAB
ANION GAP SERPL CALCULATED.3IONS-SCNC: 8 MMOL/L (ref 4–13)
BACTERIA WND AEROBE CULT: ABNORMAL
BASOPHILS # BLD AUTO: 0.04 THOUSANDS/ΜL (ref 0–0.1)
BASOPHILS NFR BLD AUTO: 0 % (ref 0–1)
BUN SERPL-MCNC: 16 MG/DL (ref 5–25)
CALCIUM SERPL-MCNC: 8.8 MG/DL (ref 8.3–10.1)
CHLORIDE SERPL-SCNC: 104 MMOL/L (ref 100–108)
CO2 SERPL-SCNC: 28 MMOL/L (ref 21–32)
CREAT SERPL-MCNC: 0.96 MG/DL (ref 0.6–1.3)
EOSINOPHIL # BLD AUTO: 0.22 THOUSAND/ΜL (ref 0–0.61)
EOSINOPHIL NFR BLD AUTO: 2 % (ref 0–6)
ERYTHROCYTE [DISTWIDTH] IN BLOOD BY AUTOMATED COUNT: 15.6 % (ref 11.6–15.1)
GFR SERPL CREATININE-BSD FRML MDRD: 98 ML/MIN/1.73SQ M
GLUCOSE SERPL-MCNC: 145 MG/DL (ref 65–140)
GLUCOSE SERPL-MCNC: 150 MG/DL (ref 65–140)
GLUCOSE SERPL-MCNC: 201 MG/DL (ref 65–140)
GRAM STN SPEC: ABNORMAL
HCT VFR BLD AUTO: 30.9 % (ref 36.5–49.3)
HGB BLD-MCNC: 9 G/DL (ref 12–17)
IMM GRANULOCYTES # BLD AUTO: 0.14 THOUSAND/UL (ref 0–0.2)
IMM GRANULOCYTES NFR BLD AUTO: 2 % (ref 0–2)
LYMPHOCYTES # BLD AUTO: 1.26 THOUSANDS/ΜL (ref 0.6–4.47)
LYMPHOCYTES NFR BLD AUTO: 13 % (ref 14–44)
MCH RBC QN AUTO: 22.8 PG (ref 26.8–34.3)
MCHC RBC AUTO-ENTMCNC: 29.1 G/DL (ref 31.4–37.4)
MCV RBC AUTO: 78 FL (ref 82–98)
MONOCYTES # BLD AUTO: 0.7 THOUSAND/ΜL (ref 0.17–1.22)
MONOCYTES NFR BLD AUTO: 8 % (ref 4–12)
NEUTROPHILS # BLD AUTO: 7.02 THOUSANDS/ΜL (ref 1.85–7.62)
NEUTS SEG NFR BLD AUTO: 75 % (ref 43–75)
NRBC BLD AUTO-RTO: 0 /100 WBCS
PLATELET # BLD AUTO: 346 THOUSANDS/UL (ref 149–390)
PMV BLD AUTO: 9.6 FL (ref 8.9–12.7)
POTASSIUM SERPL-SCNC: 4.4 MMOL/L (ref 3.5–5.3)
RBC # BLD AUTO: 3.94 MILLION/UL (ref 3.88–5.62)
SODIUM SERPL-SCNC: 140 MMOL/L (ref 136–145)
WBC # BLD AUTO: 9.38 THOUSAND/UL (ref 4.31–10.16)

## 2021-12-05 PROCEDURE — 85025 COMPLETE CBC W/AUTO DIFF WBC: CPT | Performed by: STUDENT IN AN ORGANIZED HEALTH CARE EDUCATION/TRAINING PROGRAM

## 2021-12-05 PROCEDURE — 99024 POSTOP FOLLOW-UP VISIT: CPT | Performed by: SURGERY

## 2021-12-05 PROCEDURE — 82948 REAGENT STRIP/BLOOD GLUCOSE: CPT

## 2021-12-05 PROCEDURE — 80048 BASIC METABOLIC PNL TOTAL CA: CPT | Performed by: STUDENT IN AN ORGANIZED HEALTH CARE EDUCATION/TRAINING PROGRAM

## 2021-12-05 PROCEDURE — 99239 HOSP IP/OBS DSCHRG MGMT >30: CPT | Performed by: FAMILY MEDICINE

## 2021-12-05 RX ORDER — ALBUTEROL SULFATE 90 UG/1
2 AEROSOL, METERED RESPIRATORY (INHALATION) EVERY 6 HOURS PRN
Qty: 8.5 G | Refills: 3 | Status: SHIPPED | OUTPATIENT
Start: 2021-12-05

## 2021-12-05 RX ORDER — CLINDAMYCIN HYDROCHLORIDE 300 MG/1
300 CAPSULE ORAL 4 TIMES DAILY
Qty: 28 CAPSULE | Refills: 0 | Status: SHIPPED | OUTPATIENT
Start: 2021-12-05 | End: 2021-12-12

## 2021-12-05 RX ORDER — CEPHALEXIN 500 MG/1
500 CAPSULE ORAL EVERY 6 HOURS SCHEDULED
Qty: 28 CAPSULE | Refills: 0 | Status: SHIPPED | OUTPATIENT
Start: 2021-12-05 | End: 2021-12-05 | Stop reason: ALTCHOICE

## 2021-12-05 RX ORDER — NYSTATIN 100000 [USP'U]/G
POWDER TOPICAL 3 TIMES DAILY
Qty: 30 G | Refills: 0 | Status: SHIPPED | OUTPATIENT
Start: 2021-12-05 | End: 2022-04-09 | Stop reason: HOSPADM

## 2021-12-05 RX ORDER — SACCHAROMYCES BOULARDII 250 MG
250 CAPSULE ORAL 2 TIMES DAILY
Qty: 20 CAPSULE | Refills: 0 | Status: SHIPPED | OUTPATIENT
Start: 2021-12-05 | End: 2021-12-15

## 2021-12-05 RX ADMIN — Medication 250 MG: at 08:30

## 2021-12-05 RX ADMIN — OMEGA-3 FATTY ACIDS CAP 1000 MG 1000 MG: 1000 CAP at 08:30

## 2021-12-05 RX ADMIN — NYSTATIN: 100000 POWDER TOPICAL at 08:30

## 2021-12-05 RX ADMIN — ASPIRIN 81 MG: 81 TABLET, COATED ORAL at 08:30

## 2021-12-05 RX ADMIN — AMLODIPINE BESYLATE 10 MG: 10 TABLET ORAL at 08:30

## 2021-12-05 RX ADMIN — OXYCODONE HYDROCHLORIDE 5 MG: 5 TABLET ORAL at 06:27

## 2021-12-05 RX ADMIN — TICAGRELOR 90 MG: 90 TABLET ORAL at 06:27

## 2021-12-05 RX ADMIN — OXYCODONE HYDROCHLORIDE 5 MG: 5 TABLET ORAL at 01:42

## 2021-12-05 RX ADMIN — INSULIN LISPRO 4 UNITS: 100 INJECTION, SOLUTION INTRAVENOUS; SUBCUTANEOUS at 11:18

## 2021-12-05 RX ADMIN — HEPARIN SODIUM 5000 UNITS: 5000 INJECTION INTRAVENOUS; SUBCUTANEOUS at 06:28

## 2021-12-05 RX ADMIN — CEFAZOLIN SODIUM 2000 MG: 2 SOLUTION INTRAVENOUS at 06:28

## 2021-12-05 RX ADMIN — CARVEDILOL 6.25 MG: 6.25 TABLET, FILM COATED ORAL at 08:30

## 2021-12-06 ENCOUNTER — TRANSITIONAL CARE MANAGEMENT (OUTPATIENT)
Dept: FAMILY MEDICINE CLINIC | Facility: CLINIC | Age: 41
End: 2021-12-06

## 2021-12-06 ENCOUNTER — TELEPHONE (OUTPATIENT)
Dept: FAMILY MEDICINE CLINIC | Facility: CLINIC | Age: 41
End: 2021-12-06

## 2021-12-07 ENCOUNTER — TRANSITIONAL CARE MANAGEMENT (OUTPATIENT)
Dept: FAMILY MEDICINE CLINIC | Facility: CLINIC | Age: 41
End: 2021-12-07

## 2021-12-07 LAB
BACTERIA BLD CULT: NORMAL
BACTERIA BLD CULT: NORMAL

## 2021-12-29 DIAGNOSIS — I10 ESSENTIAL HYPERTENSION: ICD-10-CM

## 2021-12-30 RX ORDER — AMLODIPINE BESYLATE 10 MG/1
TABLET ORAL
Qty: 90 TABLET | Refills: 0 | Status: SHIPPED | OUTPATIENT
Start: 2021-12-30 | End: 2022-04-22 | Stop reason: SDUPTHER

## 2022-03-07 ENCOUNTER — OFFICE VISIT (OUTPATIENT)
Dept: WOUND CARE | Facility: HOSPITAL | Age: 42
End: 2022-03-07
Payer: COMMERCIAL

## 2022-03-07 VITALS
SYSTOLIC BLOOD PRESSURE: 183 MMHG | HEIGHT: 67 IN | TEMPERATURE: 97.7 F | RESPIRATION RATE: 17 BRPM | WEIGHT: 315 LBS | DIASTOLIC BLOOD PRESSURE: 96 MMHG | HEART RATE: 83 BPM | BODY MASS INDEX: 49.44 KG/M2

## 2022-03-07 DIAGNOSIS — E66.01 MORBID OBESITY DUE TO EXCESS CALORIES (HCC): ICD-10-CM

## 2022-03-07 DIAGNOSIS — T81.89XA NON-HEALING SURGICAL WOUND, INITIAL ENCOUNTER: Primary | ICD-10-CM

## 2022-03-07 DIAGNOSIS — E11.42 TYPE 2 DIABETES MELLITUS WITH DIABETIC POLYNEUROPATHY, WITHOUT LONG-TERM CURRENT USE OF INSULIN (HCC): ICD-10-CM

## 2022-03-07 PROCEDURE — 99213 OFFICE O/P EST LOW 20 MIN: CPT | Performed by: NURSE PRACTITIONER

## 2022-03-07 PROCEDURE — 97597 DBRDMT OPN WND 1ST 20 CM/<: CPT | Performed by: NURSE PRACTITIONER

## 2022-03-07 PROCEDURE — 97598 DBRDMT OPN WND ADDL 20CM/<: CPT | Performed by: NURSE PRACTITIONER

## 2022-03-07 PROCEDURE — 99203 OFFICE O/P NEW LOW 30 MIN: CPT | Performed by: NURSE PRACTITIONER

## 2022-03-07 RX ORDER — LIDOCAINE HYDROCHLORIDE 40 MG/ML
5 SOLUTION TOPICAL ONCE
Status: COMPLETED | OUTPATIENT
Start: 2022-03-07 | End: 2022-03-07

## 2022-03-07 RX ADMIN — LIDOCAINE HYDROCHLORIDE 5 ML: 40 SOLUTION TOPICAL at 09:17

## 2022-03-07 NOTE — PATIENT INSTRUCTIONS
Orders Placed This Encounter   Procedures    Debridement     This order was created via procedure documentation    Wound cleansing and dressings     Left Thigh Wound:    Wash your hands with soap and water  Remove old dressing, discard into plastic bag and place in trash  Cleanse the wound with soap and water prior to applying a clean dressing  Do not use tissue or cotton balls  Do not scrub the wound  Pat dry using gauze  Shower yes on wound care days   Apply skin prep to skin surrounding wound  Apply Polymem to the left thigh wound    Cover with ABD  Secure with tape  Change dressing daily    This was done today     Standing Status:   Future     Standing Expiration Date:   3/7/2023

## 2022-03-07 NOTE — PROGRESS NOTES
Patient ID: Mallory Wallis is a 37 y o  male Date of Birth 1980     Chief Complaint  Chief Complaint   Patient presents with    New Patient Visit     left groin       Allergies  Amoxicillin    Assessment:     Diagnoses and all orders for this visit:    Non-healing surgical wound, initial encounter  -     lidocaine (XYLOCAINE) 4 % topical solution 5 mL  -     Debridement  -     Wound cleansing and dressings; Future    Type 2 diabetes mellitus with diabetic polyneuropathy, without long-term current use of insulin (HCC)  -     lidocaine (XYLOCAINE) 4 % topical solution 5 mL  -     Wound cleansing and dressings; Future    Morbid obesity due to excess calories (HCC)  -     Wound cleansing and dressings; Future              Debridement   Wound 03/07/22 Surgical Leg Left;Upper;Medial    Universal Protocol:  Consent: Written consent obtained  Consent given by: patient  Time out: Immediately prior to procedure a "time out" was called to verify the correct patient, procedure, equipment, support staff and site/side marked as required  Timeout called at: 3/7/2022 9:10 AM   Patient understanding: patient states understanding of the procedure being performed  Patient consent: the patient's understanding of the procedure matches consent given  Procedure consent matches procedure scheduled: N/A  Relevant documents present and verified: N/A  Test results available and properly labeled: N/A  Site marked: the operative site was marked  Imaging studies available: N/A  Required blood products, implants, devices, and special equipment available: N/A    Patient identity confirmed: verbally with patient      Performed by: NP  Debridement type: selective  Pain control: lidocaine 4%  Pre-debridement measurements  Length (cm): 4 2  Width (cm): 14  Depth (cm): 0 1  Surface Area (cm^2): 58 8  Volume (cm^3): 5 88    Post-debridement measurements  Length (cm): 4 2  Width (cm): 14  Depth (cm): 0 1  Percent debrided: 100%  Surface Area (cm^2): 58 8  Area debrided (cm^2): 58 8  Volume (cm^3): 5 88  Devitalized tissue debrided: biofilm, fibrin and slough  Instrument(s) utilized: curette  Bleeding: small  Hemostasis obtained with: pressure  Procedural pain (0-10): 0  Post-procedural pain: 0   Response to treatment: procedure was tolerated well          Plan:  1  Initial visit  Wound debrided  Wound is a full thickness wound not showing any s/s of infection  Will have Polymem applied to wound changed daily d/t large amount of drainage covered with ABDs and tape  Okay for patient to shower with wound uncovered  Patient will follow up in 1 week  Wound 03/07/22 Surgical Leg Left;Upper;Medial (Active)   Wound Image Images linked 03/07/22 0842   Wound Description Pink;Granulation tissue; Yellow;Slough; Epithelialization 03/07/22 0841   Sarika-wound Assessment Pink;Scar Tissue;Edema 03/07/22 0841   Wound Length (cm) 4 2 cm 03/07/22 0841   Wound Width (cm) 14 cm 03/07/22 0841   Wound Depth (cm) 0 1 cm 03/07/22 0841   Wound Surface Area (cm^2) 58 8 cm^2 03/07/22 0841   Wound Volume (cm^3) 5 88 cm^3 03/07/22 0841   Calculated Wound Volume (cm^3) 5 88 cm^3 03/07/22 0841   Drainage Amount Large 03/07/22 0841   Drainage Description Serous; Yellow 03/07/22 0841   Non-staged Wound Description Full thickness 03/07/22 0841   Dressing Status Intact (upon arrival) 03/07/22 0841       Wound 03/07/22 Surgical Leg Left;Upper;Medial (Active)   Date First Assessed/Time First Assessed: 03/07/22 0833   Primary Wound Type: Surgical  Location: Leg  Wound Location Orientation: Left;Upper;Medial       [REMOVED] Wound 05/26/21 Incision Thigh Left (Removed)   Resolved Date: 03/07/22  Date First Assessed/Time First Assessed: 05/26/21 1914   Pre-Existing Wound: Yes  Primary Wound Type: Incision  Location: Thigh  Wound Location Orientation: Left  Wound Description (Comments): 2 incisions  Incision's 1st Dress           [REMOVED] Wound 05/26/21 Surgical Other (Comment) Groin Left (Removed)   Resolved Date: 03/07/22  Date First Assessed/Time First Assessed: 05/26/21 1916   Pre-Existing Wound: Yes  Primary Wound Type: Surgical  Traumatic Wound Type: (c) Other (Comment)  Location: Groin  Wound Location Orientation: Left  Wound Outcome: Unkno    [REMOVED] Wound 06/01/21 Other (comment) Cellulitis Pretibial Left (Removed)   Resolved Date: 03/07/22  Date First Assessed/Time First Assessed: 06/01/21 1930   Pre-Existing Wound: Yes  Primary Wound Type: Other (comment)  Traumatic Wound Type: Cellulitis  Location: Pretibial  Wound Location Orientation: Left  Dressing Status: C  [REMOVED] Wound 06/02/21 Hip Anterior;Right (Removed)   Resolved Date: 03/07/22  Date First Assessed/Time First Assessed: 06/02/21 1028   Location: Hip  Wound Location Orientation: Anterior;Right  Wound Outcome: Unknown (No longer present)       [REMOVED] Wound 06/02/21 Sacrum (Removed)   Resolved Date: 03/07/22  Date First Assessed/Time First Assessed: 06/02/21 1034   Location: Sacrum  Wound Outcome: Unknown (No longer present)       [REMOVED] Wound 07/06/21 Leg Left (Removed)   Resolved Date: 03/07/22  Date First Assessed/Time First Assessed: 07/06/21 1639   Location: Leg  Wound Location Orientation: Left  Incision's 1st Dressing: KERLIX SUPER SPONGE 6 IN (x2), ACE WRAP 6 IN UNSTERILE (x4), SPONGE GAUZE 4 X 4 16 PLY STRL FÁTIMA    Subjective:     Patient is a 37year old male who presents to the [facility] wound center as a new patient for a non-healing surgical wound of his left medial thigh  Patient reports his wound has been present since last April  He reports his wound developed d/t patient developing multiple abscesses on his medial thigh which needed to be I&D'd  He reports since then his wound has just never healed  He reports his wound drains a large amount of drainage  He was following up with Dr Garrett Craven for management of his wound   He was initially managing his wound with Maxorb but he reports that d/t his drainage the Maxorb would get too wet which would make his wound too wet, so he has been keeping his wound covered with only ABD pads changing his dressing multiple times per day  He denies any pain, fevers, or chills  The following portions of the patient's history were reviewed and updated as appropriate:   He  has a past medical history of Arthritis, Breathing difficulty, Coronary artery disease, Diabetes mellitus ([facility] ), Heart disease, Hidradenitis suppurativa, History of transfusion, Hyperlipidemia, Hypertension, MI (myocardial infarction) ([facility] ), Morbid obesity with BMI of 50 0-59 9, adult ([place] [address] ), Nicotine dependence, Obesity, and Pilonidal cyst   He   Patient Active Problem List    Diagnosis Date Noted    Nocturnal hypoxia 12/04/2021    Cellulitis of multiple sites of trunk 12/02/2021    Chest pain 12/02/2021    Cellulitis of left lower extremity 06/23/2021    Abscess of left thigh 06/21/2021    Difficult intubation 05/28/2021    Diabetic neuropathy ([place] [address] ) 05/28/2021    Morbid obesity with BMI of 60 0-69 9, adult ([place] [address] ) 03/10/2021    Acute kidney injury ([place] [address] ) 02/22/2021    Type 1 non-ST elevation myocardial infarction (NSTEMI) ([place] [address] ) 11/29/2020    Left groin mass 11/29/2020    Type 2 diabetes mellitus with diabetic polyneuropathy, without long-term current use of insulin ([place] [address] ) 07/26/2018    Dyslipidemia 01/23/2017    Pilonidal cyst 12/13/2016    Essential hypertension 01/14/2016    Abnormal liver enzymes 09/23/2014    Coronary artery disease 06/25/2014     He  has a past surgical history that includes Leg Surgery (Left); Coronary angioplasty with stent; Incision and drainage of wound (N/A, 1/24/2017); pr exc skin benig >4 cm trunk,arm,leg (Left, 4/6/2021); pr neg press wound tx, < 50 cm (Left, 4/6/2021); Wound debridement (Left, 4/17/2021); Wound debridement (Left, 4/22/2021); Wound debridement (Left, 5/26/2021);  Wound debridement (Left, 5/28/2021); pr debridement, skin, sub-q tissue,=<20 sq cm (Left, 7/6/2021); pr exc sweat gland lesn perineal,complx (Left, 7/6/2021); and Cardiac surgery  His family history includes Diabetes in his father; Heart disease in his father and mother; Hypertension in his mother  He  reports that he quit smoking about a year ago  His smoking use included cigarettes  He has a 20 00 pack-year smoking history  He has never used smokeless tobacco  He reports previous alcohol use  He reports that he does not use drugs  Current Outpatient Medications   Medication Sig Dispense Refill    albuterol (ProAir HFA) 90 mcg/act inhaler Inhale 2 puffs every 6 (six) hours as needed for wheezing 8 5 g 3    amLODIPine (NORVASC) 10 mg tablet Take 1 tablet by mouth once daily 90 tablet 0    aspirin (ECOTRIN LOW STRENGTH) 81 mg EC tablet Take 1 tablet (81 mg total) by mouth daily 30 tablet 0    atorvastatin (LIPITOR) 80 mg tablet Take 1 tablet (80 mg total) by mouth daily (Patient taking differently: Take 80 mg by mouth daily at bedtime ) 90 tablet 2    carvedilol (COREG) 6 25 mg tablet Take 1 tablet (6 25 mg total) by mouth 2 (two) times a day with meals 120 tablet 2    glimepiride (AMARYL) 4 mg tablet Take 1 tablet (4 mg total) by mouth daily with breakfast 90 tablet 1    lisinopril (ZESTRIL) 10 mg tablet Take 1 tablet (10 mg total) by mouth daily 90 tablet 0    metFORMIN (GLUCOPHAGE) 1000 MG tablet Take 1 tablet (1,000 mg total) by mouth 2 (two) times a day with meals 180 tablet 1    Omega-3 Fatty Acids (fish oil) 1,000 mg Take 1 capsule (1,000 mg total) by mouth 2 (two) times a day 180 capsule 2    nystatin (MYCOSTATIN) powder Apply topically 3 (three) times a day (Patient not taking: Reported on 3/7/2022 ) 30 g 0    ticagrelor (Brilinta) 90 MG Take 1 tablet (90 mg total) by mouth every 12 (twelve) hours (Patient not taking: Reported on 3/7/2022 ) 180 tablet 2     No current facility-administered medications for this visit  He is allergic to amoxicillin       Review of Systems   Constitutional: Negative  HENT: Negative for ear pain and hearing loss  Eyes: Negative for pain  Respiratory: Negative for chest tightness and shortness of breath  Cardiovascular: Positive for leg swelling  Negative for chest pain and palpitations  Gastrointestinal: Negative for diarrhea, nausea and vomiting  Genitourinary: Negative for dysuria  Musculoskeletal: Negative for gait problem  Skin: Positive for wound  Neurological: Negative for tremors and weakness  Psychiatric/Behavioral: Negative for behavioral problems, confusion and suicidal ideas  Objective:       Wound 03/07/22 Surgical Leg Left;Upper;Medial (Active)   Wound Image Images linked 03/07/22 0842   Wound Description Pink;Granulation tissue; Yellow;Slough; Epithelialization 03/07/22 0841   Sarika-wound Assessment Pink;Scar Tissue;Edema 03/07/22 0841   Wound Length (cm) 4 2 cm 03/07/22 0841   Wound Width (cm) 14 cm 03/07/22 0841   Wound Depth (cm) 0 1 cm 03/07/22 0841   Wound Surface Area (cm^2) 58 8 cm^2 03/07/22 0841   Wound Volume (cm^3) 5 88 cm^3 03/07/22 0841   Calculated Wound Volume (cm^3) 5 88 cm^3 03/07/22 0841   Drainage Amount Large 03/07/22 0841   Drainage Description Serous; Yellow 03/07/22 0841   Non-staged Wound Description Full thickness 03/07/22 0841   Dressing Status Intact (upon arrival) 03/07/22 0841       BP (!) 183/96 Comment: did not take AM BP med yet  Pulse 83   Temp 97 7 °F (36 5 °C)   Resp 17   Ht 5' 7" (1 702 m)   Wt (!) 172 kg (380 lb)   BMI 59 52 kg/m²     Physical Exam  Vitals and nursing note reviewed  Constitutional:       Appearance: Normal appearance  He is morbidly obese  HENT:      Head: Normocephalic and atraumatic  Eyes:      General:         Right eye: No discharge  Left eye: No discharge  Pulmonary:      Effort: Pulmonary effort is normal  No respiratory distress  Musculoskeletal:         General: Normal range of motion        Cervical back: Normal range of motion and neck supple  No rigidity  Right lower leg: Edema present  Left lower leg: Edema present  Skin:     General: Skin is warm and dry  Findings: Wound present  No erythema  Neurological:      General: No focal deficit present  Mental Status: He is alert and oriented to person, place, and time  Mental status is at baseline  Psychiatric:         Mood and Affect: Mood normal          Behavior: Behavior normal          Thought Content: Thought content normal          Judgment: Judgment normal                    Wound Instructions:  Orders Placed This Encounter   Procedures    Debridement     This order was created via procedure documentation    Wound cleansing and dressings     Left Thigh Wound:    Wash your hands with soap and water  Remove old dressing, discard into plastic bag and place in trash  Cleanse the wound with soap and water prior to applying a clean dressing  Do not use tissue or cotton balls  Do not scrub the wound  Pat dry using gauze  Shower yes on wound care days   Apply skin prep to skin surrounding wound  Apply Polymem to the left thigh wound  Cover with ABD  Secure with tape  Change dressing daily    This was done today     Standing Status:   Future     Standing Expiration Date:   3/7/2023        Diagnosis ICD-10-CM Associated Orders   1  Non-healing surgical wound, initial encounter  [id number] lidocaine (XYLOCAINE) 4 % topical solution 5 mL     Debridement     Wound cleansing and dressings   2  Type 2 diabetes mellitus with diabetic polyneuropathy, without long-term current use of insulin ([facility])  E11 42 lidocaine (XYLOCAINE) 4 % topical solution 5 mL     Wound cleansing and dressings   3   Morbid obesity due to excess calories ([facility])  E66 01 Wound cleansing and dressings

## 2022-03-14 ENCOUNTER — OFFICE VISIT (OUTPATIENT)
Dept: WOUND CARE | Facility: HOSPITAL | Age: 42
End: 2022-03-14
Payer: COMMERCIAL

## 2022-03-14 VITALS
HEART RATE: 85 BPM | TEMPERATURE: 98.6 F | DIASTOLIC BLOOD PRESSURE: 83 MMHG | SYSTOLIC BLOOD PRESSURE: 158 MMHG | RESPIRATION RATE: 18 BRPM

## 2022-03-14 DIAGNOSIS — T81.89XA NON-HEALING SURGICAL WOUND, INITIAL ENCOUNTER: Primary | ICD-10-CM

## 2022-03-14 DIAGNOSIS — E11.42 TYPE 2 DIABETES MELLITUS WITH DIABETIC POLYNEUROPATHY, WITHOUT LONG-TERM CURRENT USE OF INSULIN (HCC): ICD-10-CM

## 2022-03-14 DIAGNOSIS — E66.01 MORBID OBESITY DUE TO EXCESS CALORIES (HCC): ICD-10-CM

## 2022-03-14 PROCEDURE — 97597 DBRDMT OPN WND 1ST 20 CM/<: CPT | Performed by: NURSE PRACTITIONER

## 2022-03-14 PROCEDURE — 97598 DBRDMT OPN WND ADDL 20CM/<: CPT | Performed by: NURSE PRACTITIONER

## 2022-03-14 RX ORDER — LIDOCAINE HYDROCHLORIDE 40 MG/ML
5 SOLUTION TOPICAL ONCE
Status: COMPLETED | OUTPATIENT
Start: 2022-03-14 | End: 2022-03-14

## 2022-03-14 RX ADMIN — LIDOCAINE HYDROCHLORIDE 5 ML: 40 SOLUTION TOPICAL at 09:15

## 2022-03-14 NOTE — PATIENT INSTRUCTIONS
Orders Placed This Encounter   Procedures    Wound cleansing and dressings     Left Thigh Wound:     Wash your hands with soap and water  Remove old dressing, discard into plastic bag and place in trash  Cleanse the wound with soap and water prior to applying a clean dressing  Do not use tissue or cotton balls  Do not scrub the wound  Pat dry using gauze  Shower yes on wound care days   Apply skin prep to skin surrounding wound  Apply Triade paste to the left thigh wound twice daily    Cover with ABD  Secure with tape  Change twice daily - may change the ABD pad more often if needed       This was done today     Standing Status:   Future     Standing Expiration Date:   3/14/2023

## 2022-03-14 NOTE — PROGRESS NOTES
Patient ID: Carlos Mendoza is a 37 y o  male Date of Birth 1980     Chief Complaint  Chief Complaint   Patient presents with    Follow Up Wound Care Visit     Thigh wound       Allergies  Amoxicillin    Assessment:     Diagnoses and all orders for this visit:    Non-healing surgical wound, initial encounter  -     lidocaine (XYLOCAINE) 4 % topical solution 5 mL  -     Wound cleansing and dressings; Future  -     Debridement    Type 2 diabetes mellitus with diabetic polyneuropathy, without long-term current use of insulin (HCC)  -     lidocaine (XYLOCAINE) 4 % topical solution 5 mL  -     Wound cleansing and dressings; Future    Morbid obesity due to excess calories (HCC)  -     lidocaine (XYLOCAINE) 4 % topical solution 5 mL  -     Wound cleansing and dressings; Future              Debridement   Wound 03/07/22 Surgical Leg Left;Upper;Medial    Universal Protocol:  Consent: Written consent obtained  Consent given by: patient  Time out: Immediately prior to procedure a "time out" was called to verify the correct patient, procedure, equipment, support staff and site/side marked as required  Timeout called at: 3/14/2022 9:30 AM   Patient understanding: patient states understanding of the procedure being performed  Patient consent: the patient's understanding of the procedure matches consent given  Procedure consent matches procedure scheduled: N/A  Relevant documents present and verified: N/A  Test results available and properly labeled: N/A  Site marked: the operative site was marked  Imaging studies available: N/A  Required blood products, implants, devices, and special equipment available: N/A    Patient identity confirmed: verbally with patient      Performed by: NP  Debridement type: selective  Pain control: lidocaine 4%  Pre-debridement measurements  Length (cm): 4 7  Width (cm): 14 5  Depth (cm): 0 1  Surface Area (cm^2): 68 15  Volume (cm^3): 6 82    Post-debridement measurements  Length (cm): 4 7  Width (cm): 14 5  Depth (cm): 0 1  Percent debrided: 100%  Surface Area (cm^2): 68 15  Area debrided (cm^2): 68 15  Volume (cm^3): 6 82  Devitalized tissue debrided: biofilm, fibrin and slough  Instrument(s) utilized: curette  Bleeding: small  Hemostasis obtained with: pressure  Procedural pain (0-10): 0  Post-procedural pain: 0   Response to treatment: procedure was tolerated well          Plan:  1  F/U visit  Wound debrided  Wound measuring the same and still draining a large amount of drainage  Will change treatment to Triad paste covered with ABD is change BID  Patient will followup in 1 week  Wound 03/07/22 Surgical Leg Left;Upper;Medial (Active)   Wound Image Images linked 03/14/22 0911   Wound Description Pink;Granulation tissue; Yellow;Slough; Epithelialization 03/14/22 0907   Sarika-wound Assessment Pink;Scar Tissue;Edema 03/14/22 0907   Wound Length (cm) 4 7 cm 03/14/22 0907   Wound Width (cm) 14 5 cm 03/14/22 0907   Wound Depth (cm) 0 1 cm 03/14/22 0907   Wound Surface Area (cm^2) 68 15 cm^2 03/14/22 0907   Wound Volume (cm^3) 6 815 cm^3 03/14/22 0907   Calculated Wound Volume (cm^3) 6 82 cm^3 03/14/22 0907   Change in Wound Size % -15 99 03/14/22 0907   Drainage Amount Large 03/14/22 0907   Drainage Description Serous; Yellow 03/14/22 0907   Non-staged Wound Description Full thickness 03/14/22 0907   Dressing Status Intact 03/14/22 0907       Wound 03/07/22 Surgical Leg Left;Upper;Medial (Active)   Date First Assessed/Time First Assessed: 03/07/22 0833   Primary Wound Type: Surgical  Location: Leg  Wound Location Orientation: Left;Upper;Medial       [REMOVED] Wound 05/26/21 Incision Thigh Left (Removed)   Resolved Date: 03/07/22  Date First Assessed/Time First Assessed: 05/26/21 1914   Pre-Existing Wound: Yes  Primary Wound Type: Incision  Location: Thigh  Wound Location Orientation: Left  Wound Description (Comments): 2 incisions  Incision's 1st Dress           [REMOVED] Wound 05/26/21 Surgical Other (Comment) Groin Left (Removed)   Resolved Date: 03/07/22  Date First Assessed/Time First Assessed: 05/26/21 1916   Pre-Existing Wound: Yes  Primary Wound Type: Surgical  Traumatic Wound Type: (c) Other (Comment)  Location: Groin  Wound Location Orientation: Left  Wound Outcome: Unkno    [REMOVED] Wound 06/01/21 Other (comment) Cellulitis Pretibial Left (Removed)   Resolved Date: 03/07/22  Date First Assessed/Time First Assessed: 06/01/21 1930   Pre-Existing Wound: Yes  Primary Wound Type: Other (comment)  Traumatic Wound Type: Cellulitis  Location: Pretibial  Wound Location Orientation: Left  Dressing Status: C  [REMOVED] Wound 06/02/21 Hip Anterior;Right (Removed)   Resolved Date: 03/07/22  Date First Assessed/Time First Assessed: 06/02/21 1028   Location: Hip  Wound Location Orientation: Anterior;Right  Wound Outcome: Unknown (No longer present)       [REMOVED] Wound 06/02/21 Sacrum (Removed)   Resolved Date: 03/07/22  Date First Assessed/Time First Assessed: 06/02/21 1034   Location: Sacrum  Wound Outcome: Unknown (No longer present)       [REMOVED] Wound 07/06/21 Leg Left (Removed)   Resolved Date: 03/07/22  Date First Assessed/Time First Assessed: 07/06/21 1639   Location: Leg  Wound Location Orientation: Left  Incision's 1st Dressing: KERLIX SUPER SPONGE 6 IN (x2), ACE WRAP 6 IN UNSTERILE (x4), SPONGE GAUZE 4 X 4 16 PLY STRL FÁTIMA    Subjective:     F/U visit for nonhealing surgical wound of left medial thigh  Patient reports his wound is still draining a large amount of drainage  He is still been required to change his dressing multiple times a day even with the Polymem dressing  He denies any pain, fevers, or chills        The following portions of the patient's history were reviewed and updated as appropriate:   He  has a past medical history of Arthritis, Breathing difficulty, Coronary artery disease, Diabetes mellitus ([address] ), Heart disease, Hidradenitis suppurativa, History of transfusion, Hyperlipidemia, Hypertension, MI (myocardial infarction) ([place] [id number] ), Morbid obesity with BMI of 50 0-59 9, adult ([place] [id number] ), Nicotine dependence, Obesity, and Pilonidal cyst   He   Patient Active Problem List    Diagnosis Date Noted    Nocturnal hypoxia 12/04/2021    Cellulitis of multiple sites of trunk 12/02/2021    Chest pain 12/02/2021    Cellulitis of left lower extremity 06/23/2021    Abscess of left thigh 06/21/2021    Difficult intubation 05/28/2021    Diabetic neuropathy ([place] [id number] ) 05/28/2021    Morbid obesity with BMI of 60 0-69 9, adult ([place] [id number] ) 03/10/2021    Acute kidney injury ([place] [id number] ) 02/22/2021    Type 1 non-ST elevation myocardial infarction (NSTEMI) ([place] [id number] ) 11/29/2020    Left groin mass 11/29/2020    Type 2 diabetes mellitus with diabetic polyneuropathy, without long-term current use of insulin ([place] [id number] ) 07/26/2018    Dyslipidemia 01/23/2017    Pilonidal cyst 12/13/2016    Essential hypertension 01/14/2016    Abnormal liver enzymes 09/23/2014    Coronary artery disease 06/25/2014     He  has a past surgical history that includes Leg Surgery (Left); Coronary angioplasty with stent; Incision and drainage of wound (N/A, 1/24/2017); pr exc skin benig >4 cm trunk,arm,leg (Left, 4/6/2021); pr neg press wound tx, < 50 cm (Left, 4/6/2021); Wound debridement (Left, 4/17/2021); Wound debridement (Left, 4/22/2021); Wound debridement (Left, 5/26/2021); Wound debridement (Left, 5/28/2021); pr debridement, skin, sub-q tissue,=<20 sq cm (Left, 7/6/2021); pr exc sweat gland lesn perineal,complx (Left, 7/6/2021); and Cardiac surgery  His family history includes Diabetes in his father; Heart disease in his father and mother; Hypertension in his mother  He  reports that he quit smoking about a year ago  His smoking use included cigarettes  He has a 20 00 pack-year smoking history  He has never used smokeless tobacco  He reports previous alcohol use  He reports that he does not use drugs    Current Outpatient Medications   Medication Sig Dispense Refill    albuterol (ProAir HFA) 90 mcg/act inhaler Inhale 2 puffs every 6 (six) hours as needed for wheezing 8 5 g 3    amLODIPine (NORVASC) 10 mg tablet Take 1 tablet by mouth once daily 90 tablet 0    aspirin (ECOTRIN LOW STRENGTH) 81 mg EC tablet Take 1 tablet (81 mg total) by mouth daily 30 tablet 0    atorvastatin (LIPITOR) 80 mg tablet Take 1 tablet (80 mg total) by mouth daily (Patient taking differently: Take 80 mg by mouth daily at bedtime ) 90 tablet 2    carvedilol (COREG) 6 25 mg tablet Take 1 tablet (6 25 mg total) by mouth 2 (two) times a day with meals 120 tablet 2    glimepiride (AMARYL) 4 mg tablet Take 1 tablet (4 mg total) by mouth daily with breakfast 90 tablet 1    lisinopril (ZESTRIL) 10 mg tablet Take 1 tablet (10 mg total) by mouth daily 90 tablet 0    metFORMIN (GLUCOPHAGE) 1000 MG tablet Take 1 tablet (1,000 mg total) by mouth 2 (two) times a day with meals 180 tablet 1    nystatin (MYCOSTATIN) powder Apply topically 3 (three) times a day (Patient not taking: Reported on 3/7/2022 ) 30 g 0    Omega-3 Fatty Acids (fish oil) 1,000 mg Take 1 capsule (1,000 mg total) by mouth 2 (two) times a day 180 capsule 2    ticagrelor (Brilinta) 90 MG Take 1 tablet (90 mg total) by mouth every 12 (twelve) hours (Patient not taking: Reported on 3/7/2022 ) 180 tablet 2     No current facility-administered medications for this visit  He is allergic to amoxicillin       Review of Systems   Constitutional: Negative  HENT: Negative for ear pain and hearing loss  Eyes: Negative for pain  Respiratory: Negative for chest tightness and shortness of breath  Cardiovascular: Negative for chest pain, palpitations and leg swelling  Gastrointestinal: Negative for diarrhea, nausea and vomiting  Genitourinary: Negative for dysuria  Musculoskeletal: Negative for gait problem  Skin: Positive for wound  Neurological: Negative for tremors and weakness  Psychiatric/Behavioral: Negative for behavioral problems, confusion and suicidal ideas  Objective:       Wound 03/07/22 Surgical Leg Left;Upper;Medial (Active)   Wound Image Images linked 03/14/22 0911   Wound Description Pink;Granulation tissue; Yellow;Slough; Epithelialization 03/14/22 0907   Sarika-wound Assessment Pink;Scar Tissue;Edema 03/14/22 0907   Wound Length (cm) 4 7 cm 03/14/22 0907   Wound Width (cm) 14 5 cm 03/14/22 0907   Wound Depth (cm) 0 1 cm 03/14/22 0907   Wound Surface Area (cm^2) 68 15 cm^2 03/14/22 0907   Wound Volume (cm^3) 6 815 cm^3 03/14/22 0907   Calculated Wound Volume (cm^3) 6 82 cm^3 03/14/22 0907   Change in Wound Size % -15 99 03/14/22 0907   Drainage Amount Large 03/14/22 0907   Drainage Description Serous; Yellow 03/14/22 0907   Non-staged Wound Description Full thickness 03/14/22 0907   Dressing Status Intact 03/14/22 0907       /83   Pulse 85   Temp 98 6 °F (37 °C)   Resp 18             Wound Instructions:  Orders Placed This Encounter   Procedures    Wound cleansing and dressings     Left Thigh Wound:     Wash your hands with soap and water  Remove old dressing, discard into plastic bag and place in trash  Cleanse the wound with soap and water prior to applying a clean dressing  Do not use tissue or cotton balls  Do not scrub the wound  Pat dry using gauze  Shower yes on wound care days   Apply skin prep to skin surrounding wound  Apply Triade paste to the left thigh wound twice daily  Cover with ABD  Secure with tape  Change twice daily - may change the ABD pad more often if needed       This was done today     Standing Status:   Future     Standing Expiration Date:   3/14/2023    Debridement     This order was created via procedure documentation        Diagnosis ICD-10-CM Associated Orders   1  Non-healing surgical wound, initial encounter  [id number] [id number] lidocaine (XYLOCAINE) 4 % topical solution 5 mL     Wound cleansing and dressings     Debridement   2   Type 2 diabetes mellitus with diabetic polyneuropathy, without long-term current use of insulin ([facility])  E11 42 lidocaine (XYLOCAINE) 4 % topical solution 5 mL     Wound cleansing and dressings   3   Morbid obesity due to excess calories ([facility])  E66 01 lidocaine (XYLOCAINE) 4 % topical solution 5 mL     Wound cleansing and dressings

## 2022-03-21 ENCOUNTER — OFFICE VISIT (OUTPATIENT)
Dept: WOUND CARE | Facility: HOSPITAL | Age: 42
End: 2022-03-21
Payer: COMMERCIAL

## 2022-03-21 VITALS
TEMPERATURE: 98 F | DIASTOLIC BLOOD PRESSURE: 96 MMHG | SYSTOLIC BLOOD PRESSURE: 177 MMHG | HEART RATE: 83 BPM | RESPIRATION RATE: 18 BRPM

## 2022-03-21 DIAGNOSIS — T81.89XA NON-HEALING SURGICAL WOUND, INITIAL ENCOUNTER: Primary | ICD-10-CM

## 2022-03-21 DIAGNOSIS — E66.01 MORBID OBESITY DUE TO EXCESS CALORIES (HCC): ICD-10-CM

## 2022-03-21 DIAGNOSIS — E11.42 TYPE 2 DIABETES MELLITUS WITH DIABETIC POLYNEUROPATHY, WITHOUT LONG-TERM CURRENT USE OF INSULIN (HCC): ICD-10-CM

## 2022-03-21 DIAGNOSIS — I89.0 LYMPHEDEMA: ICD-10-CM

## 2022-03-21 PROCEDURE — 97598 DBRDMT OPN WND ADDL 20CM/<: CPT | Performed by: NURSE PRACTITIONER

## 2022-03-21 PROCEDURE — 97597 DBRDMT OPN WND 1ST 20 CM/<: CPT | Performed by: NURSE PRACTITIONER

## 2022-03-21 RX ORDER — LIDOCAINE HYDROCHLORIDE 40 MG/ML
5 SOLUTION TOPICAL ONCE
Status: COMPLETED | OUTPATIENT
Start: 2022-03-21 | End: 2022-03-21

## 2022-03-21 RX ADMIN — LIDOCAINE HYDROCHLORIDE 5 ML: 40 SOLUTION TOPICAL at 08:46

## 2022-03-21 NOTE — PROGRESS NOTES
Patient ID: Anthony Payne is a 37 y o  male Date of Birth 1980     Chief Complaint  Chief Complaint   Patient presents with    Follow Up Wound Care Visit     Thigh wound       Allergies  Amoxicillin    Assessment:     Diagnoses and all orders for this visit:    Non-healing surgical wound, initial encounter  -     lidocaine (XYLOCAINE) 4 % topical solution 5 mL  -     Wound cleansing and dressings; Future  -     Debridement    Type 2 diabetes mellitus with diabetic polyneuropathy, without long-term current use of insulin (HCC)  -     lidocaine (XYLOCAINE) 4 % topical solution 5 mL  -     Wound cleansing and dressings; Future    Morbid obesity due to excess calories (HCC)  -     lidocaine (XYLOCAINE) 4 % topical solution 5 mL  -     Wound cleansing and dressings; Future    Lymphedema  -     Wound cleansing and dressings; Future              Debridement   Wound 03/07/22 Surgical Leg Left;Upper;Medial    Universal Protocol:  Consent: Written consent obtained  Consent given by: patient  Time out: Immediately prior to procedure a "time out" was called to verify the correct patient, procedure, equipment, support staff and site/side marked as required  Timeout called at: 3/21/2022 12:47 PM   Patient understanding: patient states understanding of the procedure being performed  Patient consent: the patient's understanding of the procedure matches consent given  Procedure consent matches procedure scheduled: N/A  Relevant documents present and verified: N/A  Test results available and properly labeled: N/A  Site marked: the operative site was marked  Imaging studies available: N/A  Required blood products, implants, devices, and special equipment available: N/A    Patient identity confirmed: verbally with patient      Performed by: NP  Debridement type: selective  Pain control: lidocaine 4%  Pre-debridement measurements  Length (cm): 4 7  Width (cm): 14  Depth (cm): 0 1  Surface Area (cm^2): 65 8  Volume (cm^3): 6 58    Post-debridement measurements  Length (cm): 4 7  Width (cm): 14  Depth (cm): 0 1  Percent debrided: 100%  Surface Area (cm^2): 65 8  Area debrided (cm^2): 65 8  Volume (cm^3): 6 58  Devitalized tissue debrided: biofilm, fibrin and slough  Instrument(s) utilized: curette  Bleeding: small  Hemostasis obtained with: pressure  Procedural pain (0-10): 0  Post-procedural pain: 0   Response to treatment: procedure was tolerated well          Plan:  1  F/U visit  Wound debrided  Wound measuring slightly smaller  Continue current plan of care  Patient will followup in 3 weeks  Wound 03/07/22 Surgical Leg Left;Upper;Medial (Active)   Wound Image Images linked 03/21/22 0842   Wound Description Pink;Granulation tissue; Yellow;Slough; Epithelialization 03/21/22 0842   Sarika-wound Assessment Edema;Pink;Scar Tissue 03/21/22 0842   Wound Length (cm) 4 7 cm 03/21/22 0842   Wound Width (cm) 14 cm 03/21/22 0842   Wound Depth (cm) 0 1 cm 03/21/22 0842   Wound Surface Area (cm^2) 65 8 cm^2 03/21/22 0842   Wound Volume (cm^3) 6 58 cm^3 03/21/22 0842   Calculated Wound Volume (cm^3) 6 58 cm^3 03/21/22 0842   Change in Wound Size % -11 9 03/21/22 0842   Drainage Amount Large 03/21/22 0842   Drainage Description Serous; Yellow 03/21/22 0842   Non-staged Wound Description Full thickness 03/21/22 0842   Dressing Status Intact 03/21/22 0842       Wound 03/07/22 Surgical Leg Left;Upper;Medial (Active)   Date First Assessed/Time First Assessed: 03/07/22 0833   Primary Wound Type: Surgical  Location: Leg  Wound Location Orientation: Left;Upper;Medial       [REMOVED] Wound 05/26/21 Incision Thigh Left (Removed)   Resolved Date: 03/07/22  Date First Assessed/Time First Assessed: 05/26/21 1914   Pre-Existing Wound: Yes  Primary Wound Type: Incision  Location: Thigh  Wound Location Orientation: Left  Wound Description (Comments): 2 incisions  Incision's 1st Dress           [REMOVED] Wound 05/26/21 Surgical Other (Comment) Groin Left (Removed)   Resolved Date: 03/07/22  Date First Assessed/Time First Assessed: 05/26/21 1916   Pre-Existing Wound: Yes  Primary Wound Type: Surgical  Traumatic Wound Type: (c) Other (Comment)  Location: Groin  Wound Location Orientation: Left  Wound Outcome: Unkno    [REMOVED] Wound 06/01/21 Other (comment) Cellulitis Pretibial Left (Removed)   Resolved Date: 03/07/22  Date First Assessed/Time First Assessed: 06/01/21 1930   Pre-Existing Wound: Yes  Primary Wound Type: Other (comment)  Traumatic Wound Type: Cellulitis  Location: Pretibial  Wound Location Orientation: Left  Dressing Status: C  [REMOVED] Wound 06/02/21 Hip Anterior;Right (Removed)   Resolved Date: 03/07/22  Date First Assessed/Time First Assessed: 06/02/21 1028   Location: Hip  Wound Location Orientation: Anterior;Right  Wound Outcome: Unknown (No longer present)       [REMOVED] Wound 06/02/21 Sacrum (Removed)   Resolved Date: 03/07/22  Date First Assessed/Time First Assessed: 06/02/21 1034   Location: Sacrum  Wound Outcome: Unknown (No longer present)       [REMOVED] Wound 07/06/21 Leg Left (Removed)   Resolved Date: 03/07/22  Date First Assessed/Time First Assessed: 07/06/21 1639   Location: Leg  Wound Location Orientation: Left  Incision's 1st Dressing: KERLIX SUPER SPONGE 6 IN (x2), ACE WRAP 6 IN UNSTERILE (x4), SPONGE GAUZE 4 X 4 16 PLY STRL FÁTIMA    Subjective:     F/U visit for nonhealing surgical wound of left medial thigh  No new complaints  He denies any pain, fevers, or chills        The following portions of the patient's history were reviewed and updated as appropriate:   He  has a past medical history of Arthritis, Breathing difficulty, Coronary artery disease, Diabetes mellitus ([facility] [address] ), Heart disease, Hidradenitis suppurativa, History of transfusion, Hyperlipidemia, Hypertension, MI (myocardial infarction) ([facility] [address] ), Morbid obesity with BMI of 50 0-59 9, adult ([facility] [address] ), Nicotine dependence, Obesity, and Pilonidal cyst   He Patient Active Problem List    Diagnosis Date Noted    Nocturnal hypoxia 12/04/2021    Cellulitis of multiple sites of trunk 12/02/2021    Chest pain 12/02/2021    Cellulitis of left lower extremity 06/23/2021    Abscess of left thigh 06/21/2021    Difficult intubation 05/28/2021    Diabetic neuropathy ([place] [address] ) 05/28/2021    Morbid obesity with BMI of 60 0-69 9, adult ([place] [address] ) 03/10/2021    Acute kidney injury ([place] [address] ) 02/22/2021    Type 1 non-ST elevation myocardial infarction (NSTEMI) ([place] [address] ) 11/29/2020    Left groin mass 11/29/2020    Type 2 diabetes mellitus with diabetic polyneuropathy, without long-term current use of insulin ([place] [address] ) 07/26/2018    Dyslipidemia 01/23/2017    Pilonidal cyst 12/13/2016    Essential hypertension 01/14/2016    Abnormal liver enzymes 09/23/2014    Coronary artery disease 06/25/2014     He  has a past surgical history that includes Leg Surgery (Left); Coronary angioplasty with stent; Incision and drainage of wound (N/A, 1/24/2017); pr exc skin benig >4 cm trunk,arm,leg (Left, 4/6/2021); pr neg press wound tx, < 50 cm (Left, 4/6/2021); Wound debridement (Left, 4/17/2021); Wound debridement (Left, 4/22/2021); Wound debridement (Left, 5/26/2021); Wound debridement (Left, 5/28/2021); pr debridement, skin, sub-q tissue,=<20 sq cm (Left, 7/6/2021); pr exc sweat gland lesn perineal,complx (Left, 7/6/2021); and Cardiac surgery  His family history includes Diabetes in his father; Heart disease in his father and mother; Hypertension in his mother  He  reports that he quit smoking about 12 months ago  His smoking use included cigarettes  He has a 20 00 pack-year smoking history  He has never used smokeless tobacco  He reports previous alcohol use  He reports that he does not use drugs    Current Outpatient Medications   Medication Sig Dispense Refill    albuterol (ProAir HFA) 90 mcg/act inhaler Inhale 2 puffs every 6 (six) hours as needed for wheezing 8 5 g 3    amLODIPine (NORVASC) 10 mg tablet Take 1 tablet by mouth once daily 90 tablet 0    aspirin (ECOTRIN LOW STRENGTH) 81 mg EC tablet Take 1 tablet (81 mg total) by mouth daily 30 tablet 0    atorvastatin (LIPITOR) 80 mg tablet Take 1 tablet (80 mg total) by mouth daily (Patient taking differently: Take 80 mg by mouth daily at bedtime ) 90 tablet 2    carvedilol (COREG) 6 25 mg tablet Take 1 tablet (6 25 mg total) by mouth 2 (two) times a day with meals 120 tablet 2    glimepiride (AMARYL) 4 mg tablet Take 1 tablet (4 mg total) by mouth daily with breakfast 90 tablet 1    lisinopril (ZESTRIL) 10 mg tablet Take 1 tablet (10 mg total) by mouth daily 90 tablet 0    metFORMIN (GLUCOPHAGE) 1000 MG tablet Take 1 tablet (1,000 mg total) by mouth 2 (two) times a day with meals 180 tablet 1    nystatin (MYCOSTATIN) powder Apply topically 3 (three) times a day (Patient not taking: Reported on 3/7/2022 ) 30 g 0    Omega-3 Fatty Acids (fish oil) 1,000 mg Take 1 capsule (1,000 mg total) by mouth 2 (two) times a day 180 capsule 2    ticagrelor (Brilinta) 90 MG Take 1 tablet (90 mg total) by mouth every 12 (twelve) hours (Patient not taking: Reported on 3/7/2022 ) 180 tablet 2     No current facility-administered medications for this visit  He is allergic to amoxicillin       Review of Systems   Constitutional: Negative  HENT: Negative for ear pain and hearing loss  Eyes: Negative for pain  Respiratory: Negative for chest tightness and shortness of breath  Cardiovascular: Positive for leg swelling  Negative for chest pain and palpitations  Gastrointestinal: Negative for diarrhea, nausea and vomiting  Genitourinary: Negative for dysuria  Musculoskeletal: Negative for gait problem  Skin: Positive for wound  Neurological: Negative for tremors and weakness  Psychiatric/Behavioral: Negative for behavioral problems, confusion and suicidal ideas           Objective:       Wound 03/07/22 Surgical Leg Left;Upper;Medial (Active) Wound Image Images linked 03/21/22 0842   Wound Description Pink;Granulation tissue; Yellow;Slough; Epithelialization 03/21/22 0842   Sarika-wound Assessment Edema;Pink;Scar Tissue 03/21/22 0842   Wound Length (cm) 4 7 cm 03/21/22 0842   Wound Width (cm) 14 cm 03/21/22 0842   Wound Depth (cm) 0 1 cm 03/21/22 0842   Wound Surface Area (cm^2) 65 8 cm^2 03/21/22 0842   Wound Volume (cm^3) 6 58 cm^3 03/21/22 0842   Calculated Wound Volume (cm^3) 6 58 cm^3 03/21/22 0842   Change in Wound Size % -11 9 03/21/22 0842   Drainage Amount Large 03/21/22 0842   Drainage Description Serous; Yellow 03/21/22 0842   Non-staged Wound Description Full thickness 03/21/22 0842   Dressing Status Intact 03/21/22 0842       BP (!) 177/96   Pulse 83   Temp 98 °F (36 7 °C)   Resp 18             Wound Instructions:  Orders Placed This Encounter   Procedures    Wound cleansing and dressings     Left Thigh Wound:     Wash your hands with soap and water  Remove old dressing, discard into plastic bag and place in trash  Cleanse the wound with soap and water prior to applying a clean dressing  Do not use tissue or cotton balls  Do not scrub the wound  Pat dry using gauze  Shower yes on wound care days   Apply skin prep to skin surrounding wound  Apply Triade paste to the left thigh wound twice daily  Cover with ABD  Secure with tape  Change twice daily - may change the ABD pad more often if needed       This was done today     Standing Status:   Future     Standing Expiration Date:   3/21/2023    Debridement     This order was created via procedure documentation        Diagnosis ICD-10-CM Associated Orders   1  Non-healing surgical wound, initial encounter  T81 [id number] lidocaine (XYLOCAINE) 4 % topical solution 5 mL     Wound cleansing and dressings     Debridement   2   Type 2 diabetes mellitus with diabetic polyneuropathy, without long-term current use of insulin (HCC)  E11 42 lidocaine (XYLOCAINE) 4 % topical solution 5 mL     Wound cleansing and dressings   3  Morbid obesity due to excess calories ([facility])  E66 01 lidocaine (XYLOCAINE) 4 % topical solution 5 mL     Wound cleansing and dressings   4   Lymphedema  I89 0 Wound cleansing and dressings

## 2022-03-30 ENCOUNTER — APPOINTMENT (EMERGENCY)
Dept: RADIOLOGY | Facility: HOSPITAL | Age: 42
DRG: 603 | End: 2022-03-30
Payer: COMMERCIAL

## 2022-03-30 ENCOUNTER — HOSPITAL ENCOUNTER (INPATIENT)
Facility: HOSPITAL | Age: 42
LOS: 10 days | Discharge: HOME | DRG: 603 | End: 2022-04-09
Attending: EMERGENCY MEDICINE | Admitting: FAMILY MEDICINE
Payer: COMMERCIAL

## 2022-03-30 ENCOUNTER — APPOINTMENT (INPATIENT)
Dept: RADIOLOGY | Facility: HOSPITAL | Age: 42
DRG: 603 | End: 2022-03-30
Payer: COMMERCIAL

## 2022-03-30 DIAGNOSIS — L03.311 CELLULITIS OF ABDOMINAL WALL: Primary | ICD-10-CM

## 2022-03-30 DIAGNOSIS — G47.34 NOCTURNAL HYPOXIA: ICD-10-CM

## 2022-03-30 DIAGNOSIS — E66.2 OBESITY HYPOVENTILATION SYNDROME (HCC): ICD-10-CM

## 2022-03-30 DIAGNOSIS — M79.3 PANNICULITIS: ICD-10-CM

## 2022-03-30 DIAGNOSIS — E66.01 MORBID OBESITY WITH BODY MASS INDEX (BMI) OF 50.0 TO 59.9 IN ADULT (HCC): ICD-10-CM

## 2022-03-30 DIAGNOSIS — G47.33 OSA (OBSTRUCTIVE SLEEP APNEA): ICD-10-CM

## 2022-03-30 DIAGNOSIS — L02.211 CUTANEOUS ABSCESS OF ABDOMINAL WALL: ICD-10-CM

## 2022-03-30 PROBLEM — M79.89 SWELLING OF LEFT LOWER EXTREMITY: Status: ACTIVE | Noted: 2022-03-30

## 2022-03-30 PROBLEM — L73.2 HIDRADENITIS SUPPURATIVA: Status: ACTIVE | Noted: 2022-03-30

## 2022-03-30 LAB
ALBUMIN SERPL BCP-MCNC: 2.4 G/DL (ref 3.5–5)
ALP SERPL-CCNC: 98 U/L (ref 46–116)
ALT SERPL W P-5'-P-CCNC: 19 U/L (ref 12–78)
ANION GAP SERPL CALCULATED.3IONS-SCNC: 4 MMOL/L (ref 4–13)
APTT PPP: 29 SECONDS (ref 23–37)
AST SERPL W P-5'-P-CCNC: 25 U/L (ref 5–45)
BASOPHILS # BLD AUTO: 0.03 THOUSANDS/ΜL (ref 0–0.1)
BASOPHILS NFR BLD AUTO: 0 % (ref 0–1)
BILIRUB SERPL-MCNC: 0.23 MG/DL (ref 0.2–1)
BUN SERPL-MCNC: 16 MG/DL (ref 5–25)
CALCIUM ALBUM COR SERPL-MCNC: 9.4 MG/DL (ref 8.3–10.1)
CALCIUM SERPL-MCNC: 8.1 MG/DL (ref 8.3–10.1)
CHLORIDE SERPL-SCNC: 102 MMOL/L (ref 100–108)
CHOLEST SERPL-MCNC: 109 MG/DL
CO2 SERPL-SCNC: 32 MMOL/L (ref 21–32)
CREAT SERPL-MCNC: 0.96 MG/DL (ref 0.6–1.3)
EOSINOPHIL # BLD AUTO: 0.11 THOUSAND/ΜL (ref 0–0.61)
EOSINOPHIL NFR BLD AUTO: 1 % (ref 0–6)
ERYTHROCYTE [DISTWIDTH] IN BLOOD BY AUTOMATED COUNT: 16.7 % (ref 11.6–15.1)
EST. AVERAGE GLUCOSE BLD GHB EST-MCNC: 169 MG/DL
FLUAV RNA RESP QL NAA+PROBE: NEGATIVE
FLUBV RNA RESP QL NAA+PROBE: NEGATIVE
GFR SERPL CREATININE-BSD FRML MDRD: 98 ML/MIN/1.73SQ M
GLUCOSE SERPL-MCNC: 100 MG/DL (ref 65–140)
GLUCOSE SERPL-MCNC: 103 MG/DL (ref 65–140)
GLUCOSE SERPL-MCNC: 121 MG/DL (ref 65–140)
GLUCOSE SERPL-MCNC: 126 MG/DL (ref 65–140)
HBA1C MFR BLD: 7.5 %
HCT VFR BLD AUTO: 35.1 % (ref 36.5–49.3)
HDLC SERPL-MCNC: 29 MG/DL
HGB BLD-MCNC: 9.8 G/DL (ref 12–17)
IMM GRANULOCYTES # BLD AUTO: 0.02 THOUSAND/UL (ref 0–0.2)
IMM GRANULOCYTES NFR BLD AUTO: 0 % (ref 0–2)
INR PPP: 1.02 (ref 0.84–1.19)
LACTATE SERPL-SCNC: 0.5 MMOL/L (ref 0.5–2)
LDLC SERPL CALC-MCNC: 63 MG/DL (ref 0–100)
LYMPHOCYTES # BLD AUTO: 0.81 THOUSANDS/ΜL (ref 0.6–4.47)
LYMPHOCYTES NFR BLD AUTO: 10 % (ref 14–44)
MCH RBC QN AUTO: 22.1 PG (ref 26.8–34.3)
MCHC RBC AUTO-ENTMCNC: 27.9 G/DL (ref 31.4–37.4)
MCV RBC AUTO: 79 FL (ref 82–98)
MONOCYTES # BLD AUTO: 0.71 THOUSAND/ΜL (ref 0.17–1.22)
MONOCYTES NFR BLD AUTO: 9 % (ref 4–12)
NEUTROPHILS # BLD AUTO: 6.55 THOUSANDS/ΜL (ref 1.85–7.62)
NEUTS SEG NFR BLD AUTO: 80 % (ref 43–75)
NRBC BLD AUTO-RTO: 0 /100 WBCS
PLATELET # BLD AUTO: 342 THOUSANDS/UL (ref 149–390)
PMV BLD AUTO: 9 FL (ref 8.9–12.7)
POTASSIUM SERPL-SCNC: 4.1 MMOL/L (ref 3.5–5.3)
PROT SERPL-MCNC: 8.2 G/DL (ref 6.4–8.2)
PROTHROMBIN TIME: 13.2 SECONDS (ref 11.6–14.5)
RBC # BLD AUTO: 4.44 MILLION/UL (ref 3.88–5.62)
RSV RNA RESP QL NAA+PROBE: NEGATIVE
SARS-COV-2 RNA RESP QL NAA+PROBE: NEGATIVE
SODIUM SERPL-SCNC: 138 MMOL/L (ref 136–145)
TRIGL SERPL-MCNC: 83 MG/DL
TSH SERPL DL<=0.05 MIU/L-ACNC: 2.78 UIU/ML (ref 0.36–3.74)
WBC # BLD AUTO: 8.23 THOUSAND/UL (ref 4.31–10.16)

## 2022-03-30 PROCEDURE — 96375 TX/PRO/DX INJ NEW DRUG ADDON: CPT

## 2022-03-30 PROCEDURE — 82948 REAGENT STRIP/BLOOD GLUCOSE: CPT

## 2022-03-30 PROCEDURE — 71045 X-RAY EXAM CHEST 1 VIEW: CPT

## 2022-03-30 PROCEDURE — 0241U HB NFCT DS VIR RESP RNA 4 TRGT: CPT | Performed by: EMERGENCY MEDICINE

## 2022-03-30 PROCEDURE — 87040 BLOOD CULTURE FOR BACTERIA: CPT | Performed by: EMERGENCY MEDICINE

## 2022-03-30 PROCEDURE — 36415 COLL VENOUS BLD VENIPUNCTURE: CPT | Performed by: EMERGENCY MEDICINE

## 2022-03-30 PROCEDURE — 85610 PROTHROMBIN TIME: CPT | Performed by: EMERGENCY MEDICINE

## 2022-03-30 PROCEDURE — 99222 1ST HOSP IP/OBS MODERATE 55: CPT | Performed by: FAMILY MEDICINE

## 2022-03-30 PROCEDURE — 96366 THER/PROPH/DIAG IV INF ADDON: CPT

## 2022-03-30 PROCEDURE — 85025 COMPLETE CBC W/AUTO DIFF WBC: CPT | Performed by: EMERGENCY MEDICINE

## 2022-03-30 PROCEDURE — 80053 COMPREHEN METABOLIC PANEL: CPT | Performed by: EMERGENCY MEDICINE

## 2022-03-30 PROCEDURE — 84443 ASSAY THYROID STIM HORMONE: CPT | Performed by: STUDENT IN AN ORGANIZED HEALTH CARE EDUCATION/TRAINING PROGRAM

## 2022-03-30 PROCEDURE — 96365 THER/PROPH/DIAG IV INF INIT: CPT

## 2022-03-30 PROCEDURE — 99285 EMERGENCY DEPT VISIT HI MDM: CPT | Performed by: EMERGENCY MEDICINE

## 2022-03-30 PROCEDURE — 93005 ELECTROCARDIOGRAM TRACING: CPT

## 2022-03-30 PROCEDURE — 99285 EMERGENCY DEPT VISIT HI MDM: CPT

## 2022-03-30 PROCEDURE — 83605 ASSAY OF LACTIC ACID: CPT | Performed by: EMERGENCY MEDICINE

## 2022-03-30 PROCEDURE — 87205 SMEAR GRAM STAIN: CPT

## 2022-03-30 PROCEDURE — 87147 CULTURE TYPE IMMUNOLOGIC: CPT

## 2022-03-30 PROCEDURE — 74177 CT ABD & PELVIS W/CONTRAST: CPT

## 2022-03-30 PROCEDURE — 87070 CULTURE OTHR SPECIMN AEROBIC: CPT

## 2022-03-30 PROCEDURE — G1004 CDSM NDSC: HCPCS

## 2022-03-30 PROCEDURE — 80061 LIPID PANEL: CPT | Performed by: STUDENT IN AN ORGANIZED HEALTH CARE EDUCATION/TRAINING PROGRAM

## 2022-03-30 PROCEDURE — 83036 HEMOGLOBIN GLYCOSYLATED A1C: CPT | Performed by: STUDENT IN AN ORGANIZED HEALTH CARE EDUCATION/TRAINING PROGRAM

## 2022-03-30 PROCEDURE — 85730 THROMBOPLASTIN TIME PARTIAL: CPT | Performed by: EMERGENCY MEDICINE

## 2022-03-30 PROCEDURE — 87186 SC STD MICRODIL/AGAR DIL: CPT

## 2022-03-30 RX ORDER — HYDROMORPHONE HCL/PF 1 MG/ML
1 SYRINGE (ML) INJECTION ONCE
Status: COMPLETED | OUTPATIENT
Start: 2022-03-30 | End: 2022-03-30

## 2022-03-30 RX ORDER — GLIMEPIRIDE 2 MG/1
4 TABLET ORAL
Status: CANCELLED | OUTPATIENT
Start: 2022-03-31

## 2022-03-30 RX ORDER — ALBUTEROL SULFATE 90 UG/1
2 AEROSOL, METERED RESPIRATORY (INHALATION) EVERY 6 HOURS PRN
Status: DISCONTINUED | OUTPATIENT
Start: 2022-03-30 | End: 2022-04-09 | Stop reason: HOSPADM

## 2022-03-30 RX ORDER — KETOROLAC TROMETHAMINE 30 MG/ML
15 INJECTION, SOLUTION INTRAMUSCULAR; INTRAVENOUS ONCE
Status: COMPLETED | OUTPATIENT
Start: 2022-03-30 | End: 2022-03-30

## 2022-03-30 RX ORDER — ATORVASTATIN CALCIUM 80 MG/1
80 TABLET, FILM COATED ORAL
Status: DISCONTINUED | OUTPATIENT
Start: 2022-03-30 | End: 2022-04-09 | Stop reason: HOSPADM

## 2022-03-30 RX ORDER — HYDROMORPHONE HCL/PF 1 MG/ML
1 SYRINGE (ML) INJECTION EVERY 4 HOURS PRN
Status: DISCONTINUED | OUTPATIENT
Start: 2022-03-30 | End: 2022-03-31

## 2022-03-30 RX ORDER — ACETAMINOPHEN 325 MG/1
650 TABLET ORAL EVERY 6 HOURS PRN
Status: DISCONTINUED | OUTPATIENT
Start: 2022-03-30 | End: 2022-03-31

## 2022-03-30 RX ORDER — ASPIRIN 81 MG/1
81 TABLET ORAL DAILY
Status: DISCONTINUED | OUTPATIENT
Start: 2022-03-30 | End: 2022-04-09 | Stop reason: HOSPADM

## 2022-03-30 RX ORDER — AMLODIPINE BESYLATE 10 MG/1
10 TABLET ORAL DAILY
Status: DISCONTINUED | OUTPATIENT
Start: 2022-03-30 | End: 2022-04-09 | Stop reason: HOSPADM

## 2022-03-30 RX ORDER — KETOROLAC TROMETHAMINE 30 MG/ML
15 INJECTION, SOLUTION INTRAMUSCULAR; INTRAVENOUS EVERY 6 HOURS SCHEDULED
Status: DISCONTINUED | OUTPATIENT
Start: 2022-03-30 | End: 2022-04-01

## 2022-03-30 RX ORDER — LISINOPRIL 10 MG/1
10 TABLET ORAL DAILY
Status: DISCONTINUED | OUTPATIENT
Start: 2022-03-30 | End: 2022-04-09 | Stop reason: HOSPADM

## 2022-03-30 RX ORDER — CEFAZOLIN SODIUM 2 G/50ML
2000 SOLUTION INTRAVENOUS EVERY 8 HOURS
Status: DISCONTINUED | OUTPATIENT
Start: 2022-03-30 | End: 2022-04-02

## 2022-03-30 RX ORDER — CARVEDILOL 6.25 MG/1
6.25 TABLET ORAL 2 TIMES DAILY WITH MEALS
Status: DISCONTINUED | OUTPATIENT
Start: 2022-03-30 | End: 2022-04-09 | Stop reason: HOSPADM

## 2022-03-30 RX ORDER — NYSTATIN 100000 [USP'U]/G
POWDER TOPICAL 3 TIMES DAILY
Status: DISCONTINUED | OUTPATIENT
Start: 2022-03-30 | End: 2022-04-06

## 2022-03-30 RX ADMIN — CARVEDILOL 6.25 MG: 6.25 TABLET, FILM COATED ORAL at 17:56

## 2022-03-30 RX ADMIN — VANCOMYCIN HYDROCHLORIDE 1500 MG: 10 INJECTION, POWDER, LYOPHILIZED, FOR SOLUTION INTRAVENOUS at 11:40

## 2022-03-30 RX ADMIN — HYDROMORPHONE HYDROCHLORIDE 1 MG: 1 INJECTION, SOLUTION INTRAMUSCULAR; INTRAVENOUS; SUBCUTANEOUS at 20:24

## 2022-03-30 RX ADMIN — NYSTATIN: 100000 POWDER TOPICAL at 20:26

## 2022-03-30 RX ADMIN — KETOROLAC TROMETHAMINE 15 MG: 30 INJECTION, SOLUTION INTRAMUSCULAR at 18:20

## 2022-03-30 RX ADMIN — NYSTATIN: 100000 POWDER TOPICAL at 17:56

## 2022-03-30 RX ADMIN — AMLODIPINE BESYLATE 10 MG: 10 TABLET ORAL at 17:51

## 2022-03-30 RX ADMIN — IOHEXOL 50 ML: 240 INJECTION, SOLUTION INTRATHECAL; INTRAVASCULAR; INTRAVENOUS; ORAL at 14:23

## 2022-03-30 RX ADMIN — CEFAZOLIN SODIUM 2000 MG: 2 SOLUTION INTRAVENOUS at 21:41

## 2022-03-30 RX ADMIN — ATORVASTATIN CALCIUM 80 MG: 80 TABLET, FILM COATED ORAL at 17:51

## 2022-03-30 RX ADMIN — LISINOPRIL 10 MG: 10 TABLET ORAL at 17:51

## 2022-03-30 RX ADMIN — IOHEXOL 100 ML: 350 INJECTION, SOLUTION INTRAVENOUS at 15:59

## 2022-03-30 RX ADMIN — KETOROLAC TROMETHAMINE 15 MG: 30 INJECTION, SOLUTION INTRAMUSCULAR at 11:38

## 2022-03-30 RX ADMIN — HYDROMORPHONE HYDROCHLORIDE 1 MG: 1 INJECTION, SOLUTION INTRAMUSCULAR; INTRAVENOUS; SUBCUTANEOUS at 13:23

## 2022-03-30 RX ADMIN — KETOROLAC TROMETHAMINE 15 MG: 30 INJECTION, SOLUTION INTRAMUSCULAR at 23:23

## 2022-03-30 RX ADMIN — ACETAMINOPHEN 650 MG: 325 TABLET, FILM COATED ORAL at 20:24

## 2022-03-30 RX ADMIN — ENOXAPARIN SODIUM 60 MG: 60 INJECTION SUBCUTANEOUS at 17:50

## 2022-03-30 RX ADMIN — CEFAZOLIN SODIUM 2000 MG: 2 SOLUTION INTRAVENOUS at 17:49

## 2022-03-30 RX ADMIN — HYDROMORPHONE HYDROCHLORIDE 1 MG: 1 INJECTION, SOLUTION INTRAMUSCULAR; INTRAVENOUS; SUBCUTANEOUS at 16:13

## 2022-03-30 RX ADMIN — ASPIRIN 81 MG: 81 TABLET, COATED ORAL at 17:51

## 2022-03-30 NOTE — H&P
[facility] Exam [facility] 37 [facility] MRN: [id number]    Unit/Bed#: ED 12 Encounter: [id number]    Patient is a 50-year-old male with a past medical history of morbid obesity, CAD, hypertension, DM 2 without insulin, NSTEMI, nocturnal hypoxia, and hydradenitis suppurativa who presented with cellulitis of his lower abdomen  He has been admitted to the [facility] inpatient service under the care of Dr Mignon Chambers and is expected to stay for more than 2 midnights  Assessment Plan:  * Cellulitis of multiple sites of trunk  Assessment & Plan  Patient with a history of multiple sites of cellulitis with last admission in 12/2021 presented with cellulitis of abdomen and pannus  Patient also has wound with some drainage  On previous admission, patient was started on Ancef 2 g q 8 and discharged on Keflex  Wound cultures grew beta-hemolytic strep,Staphylococcus haemolyticus which was susceptible to vancomycin    ED course:  Vancomycin    CT abdomen pelvis ordered  Follow-up results  Wound cultures ordered  Wound Care consulted   Nystatin powder b i d  For atrial title and abdominal pannus and groin area  Continue vancomycin and start Ancef 2 g  Continue to monitor expansion of cellulitis    Hidradenitis suppurativa  Assessment & Plan  Patient has history of hidranitis suppurativa with wounds currently present on left groin and abdominal pannus     Wound care consulted  Wound cultures pending   Daily dressings   Continue antibiotics as noted above  Swelling of left lower extremity  Assessment & Plan  Patient with a history of hidradenitis suprativa bilateral groin and thigh  Patient previously had cyst removed from his left thigh which required anesthesia  Patient has been following up with wound care weekly  Per patient, he is now to see them every 3 weeks  Patient's left lower extremity circumference is significantly increased compared to right    States that this is chronic problem and actually looks better than usual      Doppler ultrasound of left lower extremity to rule out DVT  Lovenox BID  Continue to monitor    Nocturnal hypoxia  Assessment & Plan  Oxygen saturation on admission 99% on room air  In 12/2021, patient had overnight O2 evaluation which showed significant hypoxemia with lowest desaturation of 59%  At that time, patient was advised to follow-up with pulmonology for complete sleep study  Will recommend pulmonology follow-up prior to discharge  Titrate oxygen as needed at night    Type 1 non-ST elevation myocardial infarction (NSTEMI) [facility])  Assessment & Plan  Patient has a history of MI with stent placement in 2001  Patient follows with Dr Emy Garcia outpatient    Patient currently on Coreg 6 25 mg b i d  Type 2 diabetes mellitus with diabetic polyneuropathy, without long-term current use of insulin [facility] )  Assessment & Plan  Lab Results   Component Value Date    HGBA1C 7 0 (A) 11/15/2021       Recent Labs     03/30/22  1112   POCGLU 121       Blood Sugar Average: Last 72 hrs:  (P) 121     Last A1c 7 0 in 11/2021   Repeat A1c       Held home medications at this time   Will Keep monitoring glucose   ISS  And monitor for basal and meal time insuline requirement While hospitalized     Essential hypertension  Assessment & Plan  BP on admission 180s/90s    Continue home medications:  Amlodipine 10mg, carvedilol 6 25mg and lisinopril 10mg   Monitor BP     Coronary artery disease  Assessment & Plan  Patient with a history of cardiac catheterization in 2020 and 2010  Follows with cardiology outpatient  Denies any chest pain and shortness of breath on admission  Patient currently takes Coreg 6 25 mg b i d , ASA 81 mg daily    Patient reports not taking Brilinta    Repeat EKG  Continue home medications        History of Present Illness   Patient is a 64-year-old male with a past medical history of morbid obesity, CAD, hypertension, DM 2 without insulin, NSTEMI, nocturnal hypoxia, and hydradenitis suppurativa who presented to the ED with a two day history of progressive lower abdominal pain and along with associated fever, chills and body aches  He also had 1 episode of vomiting this morning  Last night, patient noticed redness along his lower abdomen which progressed above the level of his umbilicus today  He denies any chest pain, shortness of breath, congestion or dysuria  ED Course: CXR, Vanc 1500 mg, toradol 15 mg IV, dilaudid 1 mg IV    Review of Systems   Constitutional: Positive for chills and fever  HENT: Negative for congestion and sore throat  Eyes: Negative for visual disturbance  Respiratory: Negative for choking, shortness of breath and wheezing  Cardiovascular: Negative for chest pain  Gastrointestinal: Positive for abdominal pain and vomiting  Genitourinary: Negative for dysuria  Musculoskeletal: Positive for arthralgias and myalgias  Skin: Positive for rash (Erythema of the lower abdomen)  Hematological: Does not bruise/bleed easily  Psychiatric/Behavioral: Negative for confusion         Historical Information   Past Medical History:   Diagnosis Date    Arthritis     Breathing difficulty     Coronary artery disease     Diabetes mellitus [facility] )     Heart disease     CAD   s/p ptca with 1 stent 2012    Hidradenitis suppurativa     groin    History of transfusion     Hyperlipidemia     Hypertension     MI (myocardial infarction) ([facility] )     " silent " M I  in the past    Morbid obesity with BMI of 50 0-59 9, adult ([facility] )     Nicotine dependence     Obesity     Pilonidal cyst      Past Surgical History:   Procedure Laterality Date    CARDIAC SURGERY      CORONARY ANGIOPLASTY WITH STENT PLACEMENT      INCISION AND DRAINAGE OF WOUND N/A 1/24/2017    Procedure: INCISION AND DRAINAGE (I&D) BUTTOCK, PILONIDAL CYST;  Surgeon: Zoey Tdod MD;  Location: [address] [place] [address];  Service:    Keith Spruce LEG SURGERY Left     I&D of left leg 2012    [place] DEBRIDEMENT, SKIN, SUB-Q TISSUE,=<20 SQ CM Left 2021    Procedure: DEBRIDEMENT WOUND [facility]); Surgeon: Nani Saucedo MD;  [facility];  Service: General    RI EXC SKIN BENIG >4 CM TRUNK,ARM,LEG Left 2021    Procedure: EXCISION THIGH MASS;  Surgeon: Nani Saucedo MD;  [facility];  Service: General    RI EXC SWEAT GLAND Stella Mustard Left 2021    Procedure: EXCISION PERINEAL ABSCESS LEFT THIGH;  Surgeon: Nani Saucedo MD;  [facility];  Service: General    RI NEG PRESS WOUND TX, < 50 CM Left 2021    Procedure: APPLICATION VAC DRESSING THIGH;  Surgeon: Nani Saucedo MD;  [facility];  Service: General    WOUND DEBRIDEMENT Left 2021    Procedure: DEBRIDEMENT WOUND AND DRESSING CHANGE ([facility] OUT); Surgeon: Freida Yost DO;  [facility];  Service: General    WOUND DEBRIDEMENT Left 2021    Procedure: DEBRIDEMENT LOWER EXTREMITY [facility] OUT), left groin and thigh;  Surgeon: Nani Saucedo MD;  [facility];  Service: General    WOUND DEBRIDEMENT Left 2021    Procedure: DEBRIDEMENT LOWER EXTREMITY ([facility] OUT); Surgeon:  Jacky Ortiz DO;  [facility];  Service: General    WOUND DEBRIDEMENT Left 2021    Procedure: Washout left thigh wound and closure;  Surgeon: Amie Mancuso DO;  [facility];  Service: General     Social History   Social History     Substance and Sexual Activity   Alcohol Use Not Currently    Comment: rarely     Social History     Substance and Sexual Activity   Drug Use No     Social History     Tobacco Use   Smoking Status Former Smoker    Packs/day: 1 00    Years: 20 00    Pack years: 20 00    Types: Cigarettes    Quit date: 2021    Years since quittin 0   Smokeless Tobacco Never Used     E-Cigarette Use: Never User     E-Cigarette/Vaping Substances    Nicotine No     THC No     CBD No     Flavoring No        Family History:   Family History   Problem Relation Age of Onset    Heart disease Father    Mulugeta Sanchez Diabetes Father     Hypertension Mother     Heart disease Mother        Meds/Allergies   PTA meds:   Prior to Admission Medications   Prescriptions Last Dose Informant Patient Reported? Taking?    Omega-3 Fatty Acids (fish oil) 1,000 mg 3/29/2022 at Unknown time  No Yes   Sig: Take 1 capsule (1,000 mg total) by mouth 2 (two) times a day   albuterol (ProAir HFA) 90 mcg/act inhaler Unknown at Unknown time  No No   Sig: Inhale 2 puffs every 6 (six) hours as needed for wheezing   amLODIPine (NORVASC) 10 mg tablet 3/29/2022 at Unknown time  No Yes   Sig: Take 1 tablet by mouth once daily   aspirin (ECOTRIN LOW STRENGTH) 81 mg EC tablet 3/29/2022 at Unknown time  No Yes   Sig: Take 1 tablet (81 mg total) by mouth daily   atorvastatin (LIPITOR) 80 mg tablet 3/29/2022 at Unknown time  No Yes   Sig: Take 1 tablet (80 mg total) by mouth daily   carvedilol (COREG) 6 25 mg tablet 3/29/2022 at Unknown time  No Yes   Sig: Take 1 tablet (6 25 mg total) by mouth 2 (two) times a day with meals   glimepiride (AMARYL) 4 mg tablet 3/29/2022 at Unknown time  No Yes   Sig: Take 1 tablet (4 mg total) by mouth daily with breakfast   lisinopril (ZESTRIL) 10 mg tablet 3/29/2022 at Unknown time  No Yes   Sig: Take 1 tablet (10 mg total) by mouth daily   metFORMIN (GLUCOPHAGE) 1000 MG tablet 3/29/2022 at Unknown time  No Yes   Sig: Take 1 tablet (1,000 mg total) by mouth 2 (two) times a day with meals   nystatin (MYCOSTATIN) powder   No No   Sig: Apply topically 3 (three) times a day   Patient not taking: Reported on 3/7/2022    ticagrelor (Brilinta) 90 MG   No No   Sig: Take 1 tablet (90 mg total) by mouth every 12 (twelve) hours   Patient not taking: Reported on 3/7/2022       Facility-Administered Medications: None     Allergies   Allergen Reactions    Amoxicillin Hives     childhood       Objective   First Vitals:   Blood Pressure: 150/68 (03/30/22 1024)  Pulse: 82 (03/30/22 1024)  Temperature: 98 9 °F (37 2 °C) (03/30/22 1028)  Respirations: 18 (03/30/22 1028)  SpO2: 93 % (03/30/22 1024)    Current Vitals:   Blood Pressure: (!) 173/93 (03/30/22 1454)  Pulse: 84 (03/30/22 1424)  Temperature: 98 9 °F (37 2 °C) (03/30/22 1028)  Respirations: (!) 26 (03/30/22 1424)  SpO2: 95 % (03/30/22 1424)    No intake or output data in the 24 hours ending 03/30/22 1509    Invasive Devices  Report    Peripheral Intravenous Line            Peripheral IV 03/30/22 Distal;Right;Upper;Ventral (anterior) Arm <1 day                Physical Exam  Constitutional:       General: He is not in acute distress  Appearance: He is obese  HENT:      Head: Normocephalic  Mouth/Throat:      Pharynx: Oropharynx is clear  Eyes:      Conjunctiva/sclera: Conjunctivae normal    Cardiovascular:      Rate and Rhythm: Normal rate and regular rhythm  Heart sounds: No murmur heard  Pulmonary:      Effort: Pulmonary effort is normal       Breath sounds: Normal breath sounds  Abdominal:      Tenderness: There is abdominal tenderness  Comments: panniculitis to the level of the umbilicus with Peau d'orange   Musculoskeletal:      Right lower leg: Edema present  Left lower leg: Edema present  Skin:     Capillary Refill: Capillary refill takes less than 2 seconds  Findings: Erythema and rash (See media) present  Neurological:      General: No focal deficit present  Mental Status: He is alert and oriented to person, place, and time  Psychiatric:         Mood and Affect: Mood normal          Behavior: Behavior normal          Lab Results:   Results for orders placed or performed during the hospital encounter of 03/30/22   Blood culture #1    Specimen: Arm, Right; Blood   Result Value Ref Range    Blood Culture Received in Microbiology Lab  Culture in Progress  Blood culture #2    Specimen: Arm, Right; Blood   Result Value Ref Range    Blood Culture Received in Microbiology Lab  Culture in Progress      COVID/FLU/RSV - 2 hour TAT Specimen: Nasopharyngeal Swab; Nares   Result Value Ref Range    SARS-CoV-2 Negative Negative    INFLUENZA A PCR Negative Negative    INFLUENZA B PCR Negative Negative    RSV PCR Negative Negative   CBC and differential   Result Value Ref Range    WBC 8 23 4 31 - 10 16 Thousand/uL    RBC 4 44 3 88 - 5 62 Million/uL    Hemoglobin 9 8 (L) 12 0 - 17 0 g/dL    Hematocrit 35 1 (L) 36 5 - 49 3 %    MCV 79 (L) 82 - 98 fL    MCH 22 1 (L) 26 8 - 34 3 pg    MCHC 27 9 (L) 31 4 - 37 4 g/dL    RDW 16 7 (H) 11 6 - 15 1 %    MPV 9 0 8 9 - 12 7 fL    Platelets 189 047 - 688 Thousands/uL    nRBC 0 /100 WBCs    Neutrophils Relative 80 (H) 43 - 75 %    Immat GRANS % 0 0 - 2 %    Lymphocytes Relative 10 (L) 14 - 44 %    Monocytes Relative 9 4 - 12 %    Eosinophils Relative 1 0 - 6 %    Basophils Relative 0 0 - 1 %    Neutrophils Absolute 6 55 1 85 - 7 62 Thousands/µL    Immature Grans Absolute 0 02 0 00 - 0 20 Thousand/uL    Lymphocytes Absolute 0 81 0 60 - 4 47 Thousands/µL    Monocytes Absolute 0 71 0 17 - 1 22 Thousand/µL    Eosinophils Absolute 0 11 0 00 - 0 61 Thousand/µL    Basophils Absolute 0 03 0 00 - 0 10 Thousands/µL   Protime-INR   Result Value Ref Range    Protime 13 2 11 6 - 14 5 seconds    INR 1 02 0 84 - 1 19   APTT   Result Value Ref Range    PTT 29 23 - 37 seconds   Comprehensive metabolic panel   Result Value Ref Range    Sodium 138 136 - 145 mmol/L    Potassium 4 1 3 5 - 5 3 mmol/L    Chloride 102 100 - 108 mmol/L    CO2 32 21 - 32 mmol/L    ANION GAP 4 4 - 13 mmol/L    BUN 16 5 - 25 mg/dL    Creatinine 0 96 0 60 - 1 30 mg/dL    Glucose 126 65 - 140 mg/dL    Calcium 8 1 (L) 8 3 - 10 1 mg/dL    Corrected Calcium 9 4 8 3 - 10 1 mg/dL    AST 25 5 - 45 U/L    ALT 19 12 - 78 U/L    Alkaline Phosphatase 98 46 - 116 U/L    Total Protein 8 2 6 4 - 8 2 g/dL    Albumin 2 4 (L) 3 5 - 5 0 g/dL    Total Bilirubin 0 23 0 20 - 1 00 mg/dL    eGFR 98 ml/min/1 73sq m   Lactic acid   Result Value Ref Range    LACTIC ACID 0 5 0 5 - 2 0 mmol/L   Lipid Panel with Direct LDL reflex   Result Value Ref Range    Cholesterol 109 See Comment mg/dL    Triglycerides 83 See Comment mg/dL    HDL, Direct 29 (L) >=40 mg/dL    LDL Calculated 63 0 - 100 mg/dL   Fingerstick Glucose (POCT)   Result Value Ref Range    POC Glucose 121 65 - 140 mg/dl       Imaging:   XR chest 1 view portable   Final Result      No acute cardiopulmonary disease  Workstation performed: [id number]         CT abdomen pelvis w contrast    (Results Pending)       EKG, Pathology, and Other Studies:    Code Status: Prior  Advance Directive and Living Will:      Power of :    POLST:      Counseling / Coordination of Care: Total floor / unit time spent today 35 minutes

## 2022-03-30 NOTE — ASSESSMENT & PLAN NOTE
Chronic    SpO2 on admission was 99% on RA  Patient has history of nocturnal hypoxemia  In 12/2021, patient had overnight O2 evaluation which showed significant hypoxemia with lowest desaturation of 59%  At that time, patient was advised to follow-up with pulmonology for complete sleep study  Patient has been saturating consistently in the low 80's at HS  Currently on 4 L of oxygen per NC      Home O2 Sleep Study (4/3): Baseline SpO2 at rest was 89%, during exertion is 85%, on O2 at rest is 92% at 4 L/min, and during exertion with O2 is 92% at 6 L/min  Deep Water Sleepiness Scale (4/5): 17/24    · Continue oxygen therapy   · Titrate oxygen as needed at HS   · Case management for home oxygen   · Needs polysomnography done OP  · Follow up with pulmonology at discharge

## 2022-03-30 NOTE — ASSESSMENT & PLAN NOTE
Patient with a history of hidradenitis suprativa bilateral groin and thigh  Patient previously had cyst removed from his left thigh which required anesthesia  Patient has been following up with wound care weekly  Per patient, he is now to see them every 3 weeks  Patient's left lower extremity is significantly increased compared to right    States that this is chronic problem and actually looks better than usual      Consider Doppler ultrasound of left lower extremity to rule out DVT  Lovenox BID  Continue to monitor

## 2022-03-30 NOTE — ASSESSMENT & PLAN NOTE
Oxygen saturation on admission 99% on room air  In 12/2021, patient had overnight O2 evaluation which showed significant hypoxemia with lowest desaturation of 59%  At that time, patient was advised to follow-up with pulmonology for complete sleep study      Will recommend pulmonology follow-up prior to discharge  Titrate oxygen as needed at night

## 2022-03-30 NOTE — ASSESSMENT & PLAN NOTE
Chronic     BP's on admission were in the 180's/90's       · Continue home medications  · Amlodipine (NORVASC) 10 mg, PO, Daily   · Carvedilol (COREG) 6 25 mg, PO, BID  · Lisinopril (ZESTRIL) 10 mg, PO, Daily   · Monitor BP

## 2022-03-30 NOTE — LETTER
[address] [facility] [address]  Dept: [phone]    April 9, 2022     Patient: Gio Yu   YOB: 1980   Date of Visit: 3/30/2022       To Whom it May Concern:    Rachelle Henry is under my professional care  He was seen in the hospital from 3/30/2022   to 04/09/22  He may return to work on 04/11/2022 without limitations  If you have any questions or concerns, please don't hesitate to call           Sincerely,          Oneda Meckel, MD

## 2022-03-30 NOTE — ASSESSMENT & PLAN NOTE
Per history    Patient has a history of MI with stent placement in 2001  Patient follows with Dr Maddox Innocent outpatient       · Continue home medications  · Carvedilol "COREG" 6 25 mg, PO, BID

## 2022-03-30 NOTE — ASSESSMENT & PLAN NOTE
BP on admission 180s/90s    Continue home medications:  Amlodipine 10mg, carvedilol 6 25mg and lisinopril 10mg   Monitor BP

## 2022-03-30 NOTE — ASSESSMENT & PLAN NOTE
Chronic, stable    Patient HbA1c in 11/2021 was 7 0  Repeat HbA1c on admission was 7 5  Serum Glucose have been stable, mostly in the mid 100's       · Hold home medications at this time   · Metformin (GLUCOPHAGE) 1000 mg, PO, BID with Meals     · Glimepiride (AMARYL) 4 mg, PO, Daily with breakfast   · ISS   · Monitor serum glucose    · Monitor for basal and meal time insuline requirement IP

## 2022-03-30 NOTE — ASSESSMENT & PLAN NOTE
Chronic     Patient has history of hidranitis suppurativa with wounds currently present on left groin and abdominal pannus  · Wound care consulted  · Wound cultures pending   · Daily dressings   · Continue antibiotics as noted above

## 2022-03-30 NOTE — PLAN OF CARE
Problem: MOBILITY - ADULT  Goal: Maintain or return to baseline ADL function  Description: INTERVENTIONS:  -  Assess patient's ability to carry out ADLs; assess patient's baseline for ADL function and identify physical deficits which impact ability to perform ADLs (bathing, care of mouth/teeth, toileting, grooming, dressing, etc )  - Assess/evaluate cause of self-care deficits   - Assess range of motion  - Assess patient's mobility; develop plan if impaired  - Assess patient's need for assistive devices and provide as appropriate  - Encourage maximum independence but intervene and supervise when necessary  - Involve family in performance of ADLs  - Assess for home care needs following discharge   - Consider OT consult to assist with ADL evaluation and planning for discharge  - Provide patient education as appropriate  Outcome: Progressing     Problem: Potential for Falls  Goal: Patient will remain free of falls  Description: INTERVENTIONS:  - Educate patient/family on patient safety including physical limitations  - Instruct patient to call for assistance with activity   - Consult OT/PT to assist with strengthening/mobility   - Keep Call bell within reach  - Keep bed low and locked with side rails adjusted as appropriate  - Keep care items and personal belongings within reach  - Initiate and maintain comfort rounds  - Make Fall Risk Sign visible to staff  - Offer Toileting every 2 Hours, in advance of need    Outcome: Progressing

## 2022-03-30 NOTE — ASSESSMENT & PLAN NOTE
Chronic     Patient with a history of cardiac catheterization in 2010 and 2020  Follows with cardiology outpatient  Denies any chest pain and shortness of breath on admission  Patient states not taking ticagrelor (BRILINTA) 90 mg, PO, Q12H       · Continue home medications  · Carvedilol (COREG) 6 25 mg, PO, BID  · Amlodipine (NORVASC) 10 mg, PO, BID  · Aspirin 81 mg, PO, Daily

## 2022-03-30 NOTE — ASSESSMENT & PLAN NOTE
Acute    Patient with a history of multiple sites of cellulitis with last admission in 12/2021  Presented with cellulitis of abdomen and pannus  Patient also has wound with some drainage  On previous admission, patient was started on Ancef, 2 g, IV, Q8H and discharged on Keflex  Wound cultures grew beta-hemolytic strep and Staphylococcus haemolyticus which was susceptible to vancomycin  Was given Vancomycin in the ED  Patient was placed on Vancomycin and Ancef with limited response  Was switched to clindamycin on 4/3  Was switch to cefazolin on 4/4  CT AP (3/30): No discrete intra-abdominal or pelvic abscess  Skin thickening and inflammatory stranding in the lower anterior abdominal wall pannus suggestive of cellulitis  Wound Culture (3/30): 1+ Gram positive cocci in pairs, 1+ Polys    · Continue Cefazolin 2g, IV, Q6H (Started on 4/4/22)  · On discharge transition to Keflex  · Zinc for Skin healing    · Continue Wound Care  · Continue to monitor expansion of cellulitis  · Abdominal pads to maintain area under pannus dry

## 2022-03-30 NOTE — ASSESSMENT & PLAN NOTE
Patient with a history of cardiac catheterization in 2020 and 2010  Follows with cardiology outpatient  Denies any chest pain and shortness of breath on admission  Patient currently takes Coreg 6 25 mg b i d , ASA 81 mg daily    Patient reports not taking Brilinta    Repeat EKG  Continue home medications

## 2022-03-30 NOTE — ASSESSMENT & PLAN NOTE
Patient has a history of MI with stent placement in 2001  Patient follows with Dr Ger Clark outpatient    Patient currently on Coreg 6 25 mg b i d

## 2022-03-30 NOTE — ASSESSMENT & PLAN NOTE
Patient has history of hidranitis suppurativa with wounds currently present on left groin and abdominal pannus     Wound care consulted  Wound cultures pending   Daily dressings   Continue antibiotics as noted above

## 2022-03-30 NOTE — ASSESSMENT & PLAN NOTE
Patient with a history of multiple sites of cellulitis with last admission in 12/2021 presented with cellulitis of abdomen and pannus  Patient also has wound with some drainage  On previous admission, patient was started on Ancef 2 g q 8 and discharged on Keflex  Wound cultures grew beta-hemolytic strep,Staphylococcus haemolyticus which was susceptible to vancomycin    ED course:  Vancomycin    Limited response to vanc and Ancef 2 g    Switched to Clindamycin on 4/3/22    CT abdomen pelvis ordered  Follow-up results  Wound cultures ordered  Wound Care consulted   Nystatin powder b i d   For atrial title and abdominal pannus and groin area  Continue to monitor expansion of cellulitis  Dilaudid 2 mg q 4 hours p r n  for severe pain

## 2022-03-30 NOTE — ED PROVIDER NOTES
History  Chief Complaint   Patient presents with    Flu Symptoms     since saturday     The patient the noted lower abdominal pain and discomfort associated with fever chills starting 2 days prior to arrival   Patient has body aches and thought he was having the flu  He had 1 episode of vomit  Patient did notice redness in the lower abdominal wall starting last night  By today it was over the level of his umbilicus and he became concerned and came in  Prior to Admission Medications   Prescriptions Last Dose Informant Patient Reported? Taking?    Omega-3 Fatty Acids (fish oil) 1,000 mg 3/29/2022 at Unknown time  No Yes   Sig: Take 1 capsule (1,000 mg total) by mouth 2 (two) times a day   albuterol (ProAir HFA) 90 mcg/act inhaler Unknown at Unknown time  No No   Sig: Inhale 2 puffs every 6 (six) hours as needed for wheezing   amLODIPine (NORVASC) 10 mg tablet 3/29/2022 at Unknown time  No Yes   Sig: Take 1 tablet by mouth once daily   aspirin (ECOTRIN LOW STRENGTH) 81 mg EC tablet 3/29/2022 at Unknown time  No Yes   Sig: Take 1 tablet (81 mg total) by mouth daily   atorvastatin (LIPITOR) 80 mg tablet 3/29/2022 at Unknown time  No Yes   Sig: Take 1 tablet (80 mg total) by mouth daily   carvedilol (COREG) 6 25 mg tablet 3/29/2022 at Unknown time  No Yes   Sig: Take 1 tablet (6 25 mg total) by mouth 2 (two) times a day with meals   glimepiride (AMARYL) 4 mg tablet 3/29/2022 at Unknown time  No Yes   Sig: Take 1 tablet (4 mg total) by mouth daily with breakfast   lisinopril (ZESTRIL) 10 mg tablet 3/29/2022 at Unknown time  No Yes   Sig: Take 1 tablet (10 mg total) by mouth daily   metFORMIN (GLUCOPHAGE) 1000 MG tablet 3/29/2022 at Unknown time  No Yes   Sig: Take 1 tablet (1,000 mg total) by mouth 2 (two) times a day with meals   nystatin (MYCOSTATIN) powder   No No   Sig: Apply topically 3 (three) times a day   Patient not taking: Reported on 3/7/2022    ticagrelor (Brilinta) 90 MG   No No   Sig: Take 1 tablet (90 mg total) by mouth every 12 (twelve) hours   Patient not taking: Reported on 3/7/2022       Facility-Administered Medications: None       Past Medical History:   Diagnosis Date    Arthritis     Breathing difficulty     Coronary artery disease     Diabetes mellitus ([facility] )     Heart disease     CAD   s/p ptca with 1 stent 2012    Hidradenitis suppurativa     groin    History of transfusion     Hyperlipidemia     Hypertension     MI (myocardial infarction) ([facility] )     " silent " M I  in the past    Morbid obesity with BMI of 50 0-59 9, adult ([facility] )     Nicotine dependence     Obesity     Pilonidal cyst        Past Surgical History:   Procedure Laterality Date    CARDIAC SURGERY      CORONARY ANGIOPLASTY WITH STENT PLACEMENT      INCISION AND DRAINAGE OF WOUND N/A 1/24/2017    Procedure: INCISION AND DRAINAGE (I&D) BUTTOCK, PILONIDAL CYST;  Surgeon: Ian Kessler MD;  Location: [address] [place] [address];  Service:    Ethelyn Toledo LEG SURGERY Left     I&D of left leg 2012    UT DEBRIDEMENT, SKIN, SUB-Q TISSUE,=<20 SQ CM Left 7/6/2021    Procedure: DEBRIDEMENT WOUND Ser[facility]in [facility] OUT); Surgeon: Romana Letters, MD;  [facility];  Service: General    UT EXC SKIN BENIG >4 CM TRUNK,ARM,LEG Left 4/6/2021    Procedure: EXCISION THIGH MASS;  Surgeon: Romana Letters, MD;  [facility];  Service: General    UT EXC SWEAT GLAND Jolena Staple Left 7/6/2021    Procedure: EXCISION PERINEAL ABSCESS LEFT THIGH;  Surgeon: Romana Letters, MD;  [facility];  Service: General    UT NEG PRESS WOUND TX, < 50 CM Left 4/6/2021    Procedure: APPLICATION VAC DRESSING THIGH;  Surgeon: Romana Letters, MD;  [facility];  Service: General    WOUND DEBRIDEMENT Left 4/17/2021    Procedure: DEBRIDEMENT WOUND AND DRESSING CHANGE ([facility] OUT);   Surgeon: Nata Price DO;  [facility];  Service: General    WOUND DEBRIDEMENT Left 4/22/2021    Procedure: DEBRIDEMENT LOWER EXTREMITY Serafin [facility] OUT), left groin and thigh;  Surgeon: Derril Acre Estela Jones MD;  [facility];  Service: General    WOUND DEBRIDEMENT Left 2021    Procedure: DEBRIDEMENT LOWER EXTREMITY [facility]); Surgeon: Tommy Esquivel DO;  [facility];  Service: General    WOUND DEBRIDEMENT Left 2021    Procedure: Washout left thigh wound and closure;  Surgeon: Sussy Ortega DO;  [facility];  Service: General       Family History   Problem Relation Age of Onset    Heart disease Father     Diabetes Father     Hypertension Mother     Heart disease Mother      I have reviewed and agree with the history as documented  E-Cigarette/Vaping    E-Cigarette Use Never User      E-Cigarette/Vaping Substances    Nicotine No     THC No     CBD No     Flavoring No      Social History     Tobacco Use    Smoking status: Former Smoker     Packs/day:  00     Years: 20      Pack years: 20      Types: Cigarettes     Quit date: 2021     Years since quittin 0    Smokeless tobacco: Never Used   Vaping Use    Vaping Use: Never used   Substance Use Topics    Alcohol use: Not Currently     Comment: rarely    Drug use: No       Review of Systems   Constitutional: Positive for chills and fever  HENT: Negative for congestion and sore throat  Eyes: Negative for visual disturbance  Respiratory: Negative for cough, shortness of breath and wheezing  Cardiovascular: Negative for chest pain  Gastrointestinal: Positive for abdominal pain and vomiting  Genitourinary: Negative for dysuria  Musculoskeletal: Positive for arthralgias and myalgias  Skin: Positive for color change and rash  Neurological: Negative for headaches  Hematological: Does not bruise/bleed easily  Psychiatric/Behavioral: Negative for confusion  All other systems reviewed and are negative  Physical Exam  Physical Exam  Vitals and nursing note reviewed  Constitutional:       Appearance: He is obese  HENT:      Head: Normocephalic        Right Ear: External ear normal       Left Ear: External ear normal       Nose: Nose normal       Mouth/Throat:      Pharynx: Oropharynx is clear  Eyes:      Conjunctiva/sclera: Conjunctivae normal    Cardiovascular:      Rate and Rhythm: Normal rate  Pulses: Normal pulses  Pulmonary:      Effort: Pulmonary effort is normal       Breath sounds: Normal breath sounds  Abdominal:      Palpations: Abdomen is soft  Tenderness: There is abdominal tenderness  Comments: Patient has panniculitis to the level of the umbilicus with Peau d'orange   Musculoskeletal:         General: Normal range of motion  Cervical back: Normal range of motion  Right lower leg: Edema present  Left lower leg: Edema present  Skin:     Capillary Refill: Capillary refill takes less than 2 seconds  Findings: Erythema and rash present  Neurological:      General: No focal deficit present  Mental Status: He is alert and oriented to person, place, and time     Psychiatric:         Mood and Affect: Mood normal          Behavior: Behavior normal          Vital Signs  ED Triage Vitals   Temperature Pulse Respirations Blood Pressure SpO2   03/30/22 1028 03/30/22 1024 03/30/22 1028 03/30/22 1024 03/30/22 1024   98 9 °F (37 2 °C) 82 18 150/68 93 %      Temp src Heart Rate Source Patient Position - Orthostatic VS BP Location FiO2 (%)   -- 03/30/22 1028 03/30/22 1028 03/30/22 1028 --    Monitor Sitting Left arm       Pain Score       03/30/22 1028       No Pain           Vitals:    03/30/22 1205 03/30/22 1216 03/30/22 1224 03/30/22 1254   BP: 161/74 (!) 188/98 (!) 181/83 (!) 183/95   Pulse: 70 82 84 72   Patient Position - Orthostatic VS:             Visual Acuity      ED Medications  Medications   vancomycin (VANCOCIN) 1500 mg in sodium chloride 0 9% 250 mL IVPB (1,500 mg Intravenous New Bag 3/30/22 1140)   ketorolac (TORADOL) injection 15 mg (15 mg Intravenous Given 3/30/22 1138)   HYDROmorphone (DILAUDID) injection 1 mg (1 mg Intravenous Given 3/30/22 1323)       Diagnostic Studies  Results Reviewed     Procedure Component Value Units Date/Time    Lactic acid [[id number]]  (Normal) Collected: 03/30/22 1141    Lab Status: Final result Specimen: Blood from Arm, Right Updated: 03/30/22 1220     LACTIC ACID 0 5 mmol/L     Narrative:      Result may be elevated if tourniquet was used during collection      Comprehensive metabolic panel [[id number]]  (Abnormal) Collected: 03/30/22 1141    Lab Status: Final result Specimen: Blood from Arm, Right Updated: 03/30/22 1212     Sodium 138 mmol/L      Potassium 4 1 mmol/L      Chloride 102 mmol/L      CO2 32 mmol/L      ANION GAP 4 mmol/L      BUN 16 mg/dL      Creatinine 0 96 mg/dL      Glucose 126 mg/dL      Calcium 8 1 mg/dL      Corrected Calcium 9 4 mg/dL      AST 25 U/L      ALT 19 U/L      Alkaline Phosphatase 98 U/L      Total Protein 8 2 g/dL      Albumin 2 4 g/dL      Total Bilirubin 0 23 mg/dL      eGFR 98 ml/min/1 73sq m     Narrative:      [place] guidelines for Chronic Kidney Disease (CKD):     Stage 1 with normal or high GFR (GFR > 90 mL/min/1 73 square meters)    Stage 2 Mild CKD (GFR = 60-89 mL/min/1 73 square meters)    Stage 3A Moderate CKD (GFR = 45-59 mL/min/1 73 square meters)    Stage 3B Moderate CKD (GFR = 30-44 mL/min/1 73 square meters)    Stage 4 Severe CKD (GFR = 15-29 mL/min/1 73 square meters)    Stage 5 End Stage CKD (GFR <15 mL/min/1 73 square meters)  Note: GFR calculation is accurate only with a steady state creatinine    COVID/FLU/RSV - 2 hour TAT [[id number]]  (Normal) Collected: 03/30/22 1122    Lab Status: Final result Specimen: Nares from Nasopharyngeal Swab Updated: 03/30/22 1211     SARS-CoV-2 Negative     INFLUENZA A PCR Negative     INFLUENZA B PCR Negative     RSV PCR Negative    Narrative:      FOR PEDIATRIC PATIENTS - copy/paste COVID Guidelines URL to browser: [email or web address]    SARS-CoV-2 assay is a Nucleic Acid Amplification assay intended for the  qualitative detection of nucleic acid from SARS-CoV-2 in nasopharyngeal  swabs  Results are for the presumptive identification of SARS-CoV-2 RNA  Positive results are indicative of infection with SARS-CoV-2, the virus  causing COVID-19, but do not rule out bacterial infection or co-infection  with other viruses  Laboratories within the [place] and its  territories are required to report all positive results to the appropriate  public health authorities  Negative results do not preclude SARS-CoV-2  infection and should not be used as the sole basis for treatment or other  patient management decisions  Negative results must be combined with  clinical observations, patient history, and epidemiological information  This test has not been FDA cleared or approved  This test has been authorized by FDA under an Emergency Use Authorization  (EUA)  This test is only authorized for the duration of time the  declaration that circumstances exist justifying the authorization of the  emergency use of an in vitro diagnostic tests for detection of SARS-CoV-2  virus and/or diagnosis of COVID-19 infection under section 564(b)(1) of  the Act, 21 U  S C  501SGC-2(R)(9), unless the authorization is terminated  or revoked sooner  The test has been validated but independent review by FDA  and CLIA is pending  Test performed using [employer] GeneXpert: This RT-PCR assay targets N2,  a region unique to SARS-CoV-2  A conserved region in the E-gene was chosen  for pan-Sarbecovirus detection which includes SARS-CoV-2      Kaur Hernandez [[id number]]  (Normal) Collected: 03/30/22 1141    Lab Status: Final result Specimen: Blood from Arm, Right Updated: 03/30/22 1207     Protime 13 2 seconds      INR 1 02    APTT [[id number]]  (Normal) Collected: 03/30/22 1141    Lab Status: Final result Specimen: Blood from Arm, Right Updated: 03/30/22 1207     PTT 29 seconds     Fingerstick Glucose (POCT) [[id number]]  (Normal) Collected: 03/30/22 1112    Lab Status: Final result Updated: 03/30/22 1152     POC Glucose 121 mg/dl     CBC and differential [[id number]]  (Abnormal) Collected: 03/30/22 1141    Lab Status: Final result Specimen: Blood from Arm, Right Updated: 03/30/22 1150     WBC 8 23 Thousand/uL      RBC 4 44 Million/uL      Hemoglobin 9 8 g/dL      Hematocrit 35 1 %      MCV 79 fL      MCH 22 1 pg      MCHC 27 9 g/dL      RDW 16 7 %      MPV 9 0 fL      Platelets 717 Thousands/uL      nRBC 0 /100 WBCs      Neutrophils Relative 80 %      Immat GRANS % 0 %      Lymphocytes Relative 10 %      Monocytes Relative 9 %      Eosinophils Relative 1 %      Basophils Relative 0 %      Neutrophils Absolute 6 55 Thousands/µL      Immature Grans Absolute 0 02 Thousand/uL      Lymphocytes Absolute 0 81 Thousands/µL      Monocytes Absolute 0 71 Thousand/µL      Eosinophils Absolute 0 11 Thousand/µL      Basophils Absolute 0 03 Thousands/µL     Blood culture #2 [[id number]] Collected: 03/30/22 1141    Lab Status: In process Specimen: Blood from Arm, Right Updated: 03/30/22 1148    Blood culture #1 [[id number]] Collected: 03/30/22 1141    Lab Status:  In process Specimen: Blood from Arm, Right Updated: 03/30/22 1147    UA (URINE) with reflex to Scope [[id number]]     Lab Status: No result Specimen: Urine                  XR chest 1 view portable    (Results Pending)              Procedures  ECG 12 Lead Documentation Only    Date/Time: 3/30/2022 11:19 AM  Performed by: Chiqui Prater MD  Authorized by: Chiqui Prater MD     Indications / Diagnosis:  Sepsis  ECG reviewed by me, the ED Provider: yes    Patient location:  ED  Interpretation:     Interpretation: abnormal    Rate:     ECG rate:  76    ECG rate assessment: normal    Rhythm:     Rhythm: sinus rhythm    Ectopy:     Ectopy: none    QRS:     QRS axis:  Normal QRS intervals:  Normal  Conduction:     Conduction: normal    ST segments:     ST segments:  Abnormal  T waves:     T waves: normal               ED Course                               SBIRT 22yo+      Most Recent Value   SBIRT (24 yo +)    In order to provide better care to our patients, we are screening all of our patients for alcohol and drug use  Would it be okay to ask you these screening questions? Yes Filed at: 03/30/2022 1032   Initial Alcohol Screen: US AUDIT-C     1  How often do you have a drink containing alcohol? 0 Filed at: 03/30/2022 1032   2  How many drinks containing alcohol do you have on a typical day you are drinking? 0 Filed at: 03/30/2022 1032   3a  Male UNDER 65: How often do you have five or more drinks on one occasion? 0 Filed at: 03/30/2022 1032   3b  FEMALE Any Age, or MALE 65+: How often do you have 4 or more drinks on one occassion? 0 Filed at: 03/30/2022 1032   Audit-C Score 0 Filed at: 03/30/2022 1032   ALEX: How many times in the past year have you    Used an illegal drug or used a prescription medication for non-medical reasons? Never Filed at: 03/30/2022 1032                    MDM  Number of Diagnoses or Management Options  Cellulitis of abdominal wall  Panniculitis  Diagnosis management comments: Cellulitis of the abdominal wall    Will check for sepsis      Disposition  Final diagnoses:   Cellulitis of abdominal wall   Panniculitis     Time reflects when diagnosis was documented in both MDM as applicable and the Disposition within this note     Time User Action Codes Description Comment    3/30/2022  1:28 PM Christobal Meckel Add [L03 311] Cellulitis of abdominal wall     3/30/2022  1:28 PM Edenilson LANCASTER Add [M79 3] Panniculitis       ED Disposition     ED Disposition Condition Date/Time Comment    Admit Stable Wed Mar 30, 2022  1:28 PM Case was discussed with [facility] and the patient's admission status was agreed to be Admission Status: inpatient status to the service of Dr Luc Cardoso   Follow-up Information    None         Patient's Medications   Discharge Prescriptions    No medications on file       No discharge procedures on file      PDMP Review       Value Time User    PDMP Reviewed  Yes 4/29/2021 10:52 AM Ruthie Palacios PA-C          ED Provider  Electronically Signed by           Ruthann Cogan, MD  03/30/22 8936

## 2022-03-30 NOTE — ASSESSMENT & PLAN NOTE
Chronic     Patient with a history of hidradenitis suppurativa on bilateral groin and thigh  Patient previously had cyst removed from his left thigh which required anesthesia  He has been following up with wound care weekly  Per patient, he is now to see them every 3 weeks  On PE, his left lower extremity circumference is significantly increased compared to right  States that this is chronic problem and actually looks better than usual  Also has history of lymphedema on left LE  VAS LL Venous duplex (3/30): RLL LIMITED: No evidence of thrombus in the common femoral vein  Normal response to augmentation maneuvers  LLL: No evidence of acute or chronic DVT  No evidence of superficial thrombophlebitis noted  Normal response to augmentation maneuvers  Popliteal, posterior tibial and anterior tibial arterial Doppler waveforms are triphasic         · Enoxaparin (LOVENOX) 60 mg, SC, Daily   · Continue to monitor

## 2022-03-30 NOTE — ASSESSMENT & PLAN NOTE
Last A1c 7 0 in 11/2021   Repeat A1c       Held home medications at this time   Will Keep monitoring glucose   ISS  And monitor for basal and meal time insuline requirement While hospitalized

## 2022-03-31 ENCOUNTER — APPOINTMENT (INPATIENT)
Dept: RADIOLOGY | Facility: HOSPITAL | Age: 42
DRG: 603 | End: 2022-03-31
Payer: COMMERCIAL

## 2022-03-31 LAB
ALBUMIN SERPL BCP-MCNC: 2.2 G/DL (ref 3.5–5)
ALP SERPL-CCNC: 99 U/L (ref 46–116)
ALT SERPL W P-5'-P-CCNC: 19 U/L (ref 12–78)
ANION GAP SERPL CALCULATED.3IONS-SCNC: 8 MMOL/L (ref 4–13)
AST SERPL W P-5'-P-CCNC: 19 U/L (ref 5–45)
BASOPHILS # BLD AUTO: 0.02 THOUSANDS/ΜL (ref 0–0.1)
BASOPHILS NFR BLD AUTO: 0 % (ref 0–1)
BILIRUB SERPL-MCNC: 0.28 MG/DL (ref 0.2–1)
BUN SERPL-MCNC: 20 MG/DL (ref 5–25)
CALCIUM ALBUM COR SERPL-MCNC: 9.4 MG/DL (ref 8.3–10.1)
CALCIUM SERPL-MCNC: 8 MG/DL (ref 8.3–10.1)
CHLORIDE SERPL-SCNC: 100 MMOL/L (ref 100–108)
CO2 SERPL-SCNC: 29 MMOL/L (ref 21–32)
CREAT SERPL-MCNC: 1.09 MG/DL (ref 0.6–1.3)
EOSINOPHIL # BLD AUTO: 0.23 THOUSAND/ΜL (ref 0–0.61)
EOSINOPHIL NFR BLD AUTO: 3 % (ref 0–6)
ERYTHROCYTE [DISTWIDTH] IN BLOOD BY AUTOMATED COUNT: 16.3 % (ref 11.6–15.1)
GFR SERPL CREATININE-BSD FRML MDRD: 84 ML/MIN/1.73SQ M
GLUCOSE SERPL-MCNC: 109 MG/DL (ref 65–140)
GLUCOSE SERPL-MCNC: 115 MG/DL (ref 65–140)
GLUCOSE SERPL-MCNC: 121 MG/DL (ref 65–140)
GLUCOSE SERPL-MCNC: 135 MG/DL (ref 65–140)
GLUCOSE SERPL-MCNC: 175 MG/DL (ref 65–140)
HCT VFR BLD AUTO: 32.6 % (ref 36.5–49.3)
HGB BLD-MCNC: 9 G/DL (ref 12–17)
IMM GRANULOCYTES # BLD AUTO: 0.02 THOUSAND/UL (ref 0–0.2)
IMM GRANULOCYTES NFR BLD AUTO: 0 % (ref 0–2)
LYMPHOCYTES # BLD AUTO: 1.3 THOUSANDS/ΜL (ref 0.6–4.47)
LYMPHOCYTES NFR BLD AUTO: 18 % (ref 14–44)
MAGNESIUM SERPL-MCNC: 1.9 MG/DL (ref 1.6–2.6)
MCH RBC QN AUTO: 22 PG (ref 26.8–34.3)
MCHC RBC AUTO-ENTMCNC: 27.6 G/DL (ref 31.4–37.4)
MCV RBC AUTO: 80 FL (ref 82–98)
MONOCYTES # BLD AUTO: 0.82 THOUSAND/ΜL (ref 0.17–1.22)
MONOCYTES NFR BLD AUTO: 11 % (ref 4–12)
NEUTROPHILS # BLD AUTO: 4.96 THOUSANDS/ΜL (ref 1.85–7.62)
NEUTS SEG NFR BLD AUTO: 68 % (ref 43–75)
NRBC BLD AUTO-RTO: 0 /100 WBCS
PLATELET # BLD AUTO: 295 THOUSANDS/UL (ref 149–390)
PMV BLD AUTO: 8.5 FL (ref 8.9–12.7)
POTASSIUM SERPL-SCNC: 4.3 MMOL/L (ref 3.5–5.3)
PROT SERPL-MCNC: 7.5 G/DL (ref 6.4–8.2)
RBC # BLD AUTO: 4.09 MILLION/UL (ref 3.88–5.62)
SODIUM SERPL-SCNC: 137 MMOL/L (ref 136–145)
VANCOMYCIN TROUGH SERPL-MCNC: 16.1 UG/ML (ref 10–20)
WBC # BLD AUTO: 7.35 THOUSAND/UL (ref 4.31–10.16)

## 2022-03-31 PROCEDURE — 80053 COMPREHEN METABOLIC PANEL: CPT

## 2022-03-31 PROCEDURE — 82948 REAGENT STRIP/BLOOD GLUCOSE: CPT

## 2022-03-31 PROCEDURE — 93971 EXTREMITY STUDY: CPT | Performed by: SURGERY

## 2022-03-31 PROCEDURE — 93971 EXTREMITY STUDY: CPT

## 2022-03-31 PROCEDURE — 80202 ASSAY OF VANCOMYCIN: CPT

## 2022-03-31 PROCEDURE — 99232 SBSQ HOSP IP/OBS MODERATE 35: CPT | Performed by: FAMILY MEDICINE

## 2022-03-31 PROCEDURE — 85025 COMPLETE CBC W/AUTO DIFF WBC: CPT

## 2022-03-31 PROCEDURE — 83735 ASSAY OF MAGNESIUM: CPT

## 2022-03-31 RX ORDER — HYDROMORPHONE HCL 110MG/55ML
2 PATIENT CONTROLLED ANALGESIA SYRINGE INTRAVENOUS EVERY 4 HOURS PRN
Status: DISCONTINUED | OUTPATIENT
Start: 2022-03-31 | End: 2022-04-02

## 2022-03-31 RX ORDER — ACETAMINOPHEN 325 MG/1
650 TABLET ORAL EVERY 6 HOURS PRN
Status: DISCONTINUED | OUTPATIENT
Start: 2022-03-31 | End: 2022-04-04

## 2022-03-31 RX ADMIN — ENOXAPARIN SODIUM 60 MG: 60 INJECTION SUBCUTANEOUS at 09:06

## 2022-03-31 RX ADMIN — CARVEDILOL 6.25 MG: 6.25 TABLET, FILM COATED ORAL at 17:46

## 2022-03-31 RX ADMIN — HYDROMORPHONE HYDROCHLORIDE 2 MG: 2 INJECTION, SOLUTION INTRAMUSCULAR; INTRAVENOUS; SUBCUTANEOUS at 11:59

## 2022-03-31 RX ADMIN — HYDROMORPHONE HYDROCHLORIDE 1 MG: 1 INJECTION, SOLUTION INTRAMUSCULAR; INTRAVENOUS; SUBCUTANEOUS at 00:45

## 2022-03-31 RX ADMIN — KETOROLAC TROMETHAMINE 15 MG: 30 INJECTION, SOLUTION INTRAMUSCULAR at 11:13

## 2022-03-31 RX ADMIN — HYDROMORPHONE HYDROCHLORIDE 2 MG: 2 INJECTION, SOLUTION INTRAMUSCULAR; INTRAVENOUS; SUBCUTANEOUS at 15:59

## 2022-03-31 RX ADMIN — LISINOPRIL 10 MG: 10 TABLET ORAL at 09:06

## 2022-03-31 RX ADMIN — HYDROMORPHONE HYDROCHLORIDE 1 MG: 1 INJECTION, SOLUTION INTRAMUSCULAR; INTRAVENOUS; SUBCUTANEOUS at 04:37

## 2022-03-31 RX ADMIN — ASPIRIN 81 MG: 81 TABLET, COATED ORAL at 09:06

## 2022-03-31 RX ADMIN — HYDROMORPHONE HYDROCHLORIDE 1 MG: 1 INJECTION, SOLUTION INTRAMUSCULAR; INTRAVENOUS; SUBCUTANEOUS at 08:58

## 2022-03-31 RX ADMIN — KETOROLAC TROMETHAMINE 15 MG: 30 INJECTION, SOLUTION INTRAMUSCULAR at 05:50

## 2022-03-31 RX ADMIN — Medication 1500 MG: at 11:13

## 2022-03-31 RX ADMIN — CEFAZOLIN SODIUM 2000 MG: 2 SOLUTION INTRAVENOUS at 21:22

## 2022-03-31 RX ADMIN — Medication 1500 MG: at 19:02

## 2022-03-31 RX ADMIN — ATORVASTATIN CALCIUM 80 MG: 80 TABLET, FILM COATED ORAL at 17:46

## 2022-03-31 RX ADMIN — Medication 1500 MG: at 02:09

## 2022-03-31 RX ADMIN — KETOROLAC TROMETHAMINE 15 MG: 30 INJECTION, SOLUTION INTRAMUSCULAR at 17:46

## 2022-03-31 RX ADMIN — CARVEDILOL 6.25 MG: 6.25 TABLET, FILM COATED ORAL at 09:06

## 2022-03-31 RX ADMIN — KETOROLAC TROMETHAMINE 15 MG: 30 INJECTION, SOLUTION INTRAMUSCULAR at 23:44

## 2022-03-31 RX ADMIN — HYDROMORPHONE HYDROCHLORIDE 2 MG: 2 INJECTION, SOLUTION INTRAMUSCULAR; INTRAVENOUS; SUBCUTANEOUS at 20:28

## 2022-03-31 RX ADMIN — AMLODIPINE BESYLATE 10 MG: 10 TABLET ORAL at 09:06

## 2022-03-31 RX ADMIN — CEFAZOLIN SODIUM 2000 MG: 2 SOLUTION INTRAVENOUS at 14:24

## 2022-03-31 RX ADMIN — CEFAZOLIN SODIUM 2000 MG: 2 SOLUTION INTRAVENOUS at 05:50

## 2022-03-31 RX ADMIN — INSULIN LISPRO 2 UNITS: 100 INJECTION, SOLUTION INTRAVENOUS; SUBCUTANEOUS at 11:59

## 2022-03-31 NOTE — CASE MANAGEMENT
Case Management Assessment & Discharge Planning Note    Patient name Abel Mathew  Location [address] [facility] [address] ZZFWI 227-* MRN [id number]  : 1980 Date 3/31/2022       Current Admission Date: 3/30/2022  Current Admission Diagnosis:Cellulitis of multiple sites of trunk   Patient Active Problem List    Diagnosis Date Noted    Swelling of left lower extremity 2022    Hidradenitis suppurativa 2022    Nocturnal hypoxia 2021    Cellulitis of multiple sites of trunk 2021    Chest pain 2021    Cellulitis of left lower extremity 2021    Abscess of left thigh 2021    Difficult intubation 2021    Diabetic neuropathy ([facility] ) 2021    Morbid obesity with BMI of 60 0-69 9, adult ([place] [address] ) 03/10/2021    Acute kidney injury ([place] [address] ) 2021    Type 1 non-ST elevation myocardial infarction (NSTEMI) ([place] [address] ) 2020    Left groin mass 2020    Type 2 diabetes mellitus with diabetic polyneuropathy, without long-term current use of insulin ([place] [address] ) 2018    Dyslipidemia 2017    Pilonidal cyst 2016    Essential hypertension 2016    Abnormal liver enzymes 2014    Coronary artery disease 2014      LOS (days): 1  Geometric Mean LOS (GMLOS) (days):   Days to GMLOS:     OBJECTIVE:  Risk of Unplanned Readmission Score: 13   Current admission status: Inpatient  Preferred Pharmacy:   [facility] [address] [place]  [phone] [place] [id number]  Phone: [phone] Fax: [phone]    Primary Care Provider: Ivone Teran MD  Primary Insurance: Mobile  Secondary Insurance: [id number]    ASSESSMENT:  [address] [place] [address] Phone: [phone] (Mobile)          Advance Directives  Does patient have a [address] [place] [address] Avenue?: No  Was patient offered paperwork?: Yes  Does patient currently have a Health Care decision maker?: Yes, please see Health Care Proxy section  Does patient have Advance Directives?: No  Was patient offered paperwork?: Yes    Readmission Root Cause  30 Day Readmission: No    Patient Information  Admitted from[de-identified] Home  Mental Status: Alert  During Assessment patient was accompanied by: Not accompanied during assessment  Assessment information provided by[de-identified] Patient  Primary Caregiver: Self  Support Systems: [address] of Residence: [address] [place] [address] do you live in?: V[employer] entry access options   Select all that apply : Stairs  Number of steps to enter home : 3  Do the steps have railings?: Yes  Type of Current Residence: 2 story home  Upon entering residence, is there a bedroom on the main floor (no further steps)?: No  A bedroom is located on the following floor levels of residence (select all that apply):: 2nd Floor  Upon entering residence, is there a bathroom on the main floor (no further steps)?: No  Indicate which floors of current residence have a bathroom (select all the apply):: 2nd Floor  Number of steps to 2nd floor from main floor: One Flight  In the last 12 months, was there a time when you were not able to pay the mortgage or rent on time?: No  In the last 12 months, how many places have you lived?: 1  In the last 12 months, was there a time when you did not have a steady place to sleep or slept in a shelter (including now)?: No  Homeless/housing insecurity resource given?: N/A  Living Arrangements: Lives w/ Son  Is patient a ?: No    Activities of Daily Living Prior to Admission  Functional Status: Independent  Completes ADLs independently?: Yes  Ambulates independently?: Yes  Does patient use assisted devices?: No  Does patient currently own DME?: No  Does patient have a history of Outpatient Therapy (PT/OT)?: No  Does the patient have a history of Short-Term Rehab?: No  Does patient have a history of HHC?: Yes (Patient has used [facility] in the past )  Does patient currently have [facility]?: No    Patient Information Continued  Income Source: Employed  Does patient have prescription coverage?: Yes  Within the past 12 months, you worried that your food would run out before you got the money to buy more : Never true  Within the past 12 months, the food you bought just didnt last and you didnt have money to get more : Never true  Food insecurity resource given?: N/A  Does patient receive dialysis treatments?: No  Does patient have a history of substance abuse?: No  Does patient have a history of Mental Health Diagnosis?: No    Means of Transportation  Means of Transport to Appts[de-identified] Drives Self  In the past 12 months, has lack of transportation kept you from medical appointments or from getting medications?: No  In the past 12 months, has lack of transportation kept you from meetings, work, or from getting things needed for daily living?: No  Was application for public transport provided?: N/A    DISCHARGE DETAILS:  Discharge planning discussed with[de-identified] Patient  Freedom of Choice: Yes  Comments - Freedom of Choice: Patient declines the need for any services at this time  States he will resume with the [address] [place]    CM contacted family/caregiver?: No- see comments (Patient requests CM not contact his 22 y/o son )  Were Treatment Team discharge recommendations reviewed with patient/caregiver?: Yes  Did patient/caregiver verbalize understanding of patient care needs?: Yes  Were patient/caregiver advised of the risks associated with not following Treatment Team discharge recommendations?: Yes    [address]         Is the patient interested in [facility] at discharge?: No    DME Referral Provided  Referral made for DME?: No    Would you like to participate in our [address] service program?  : No - Declined    Treatment Team Recommendation: Other (TBD)  Discharge Destination Plan[de-identified] Other (TBD)  Transport at Discharge : Family      CM met with patient and discussed dc planning, available services and the role of CM  Patient states he lives with his 24 y/o son  He is independents of all ADL's and ambulates without assistance  He does not own any DME nor does he need any  He works FT and drives  He currently goes to the [facility] every Wednesday and states he will resume at dc  He declines the need for any other services at dc  Cm to follow

## 2022-03-31 NOTE — UTILIZATION REVIEW
Initial Clinical Review    Admission: Date/Time/Statement:   Admission Orders (From admission, onward)     Ordered        03/30/22 1328  Inpatient Admission  Once                      Orders Placed This Encounter   Procedures    Inpatient Admission     Standing Status:   Standing     Number of Occurrences:   1     Order Specific Question:   Level of Care     Answer:   Med Surg [16]     Order Specific Question:   Estimated length of stay     Answer:   More than 2 Midnights     Order Specific Question:   Certification     Answer:   I certify that inpatient services are medically necessary for this patient for a duration of greater than two midnights  See H&P and MD Progress Notes for additional information about the patient's course of treatment  ED Arrival Information     Expected Arrival Acuity    - 3/30/2022 10:10 Less Urgent         Means of arrival Escorted by Service Admission type    Walk-In Self General Medicine Urgent         Arrival complaint    Flu like sym  - Redness of abd skin        Chief Complaint   Patient presents with    Flu Symptoms     since saturday     Initial Presentation:   37 yom to ER from home c/o lower abd discomfort associated with fever, chills, body aches x 2 days; noticed redness on lower abd wall last night, now progressed to level of umbilicus today  Hx morbid obesity, CAD, hypertension, DM 2 without insulin, NSTEMI, nocturnal hypoxia, and hydradenitis suppurativa  Presents with abd tenderness, panniculitis to the level of the umbilicus with Peau d'orange  Admission work-up showing +wound cultures, cellulitis on imaging  Admitted to inpatient status for cellulitis  Started on IVABT  Date: 3/31/22   Day 2:   Persistent lower abd pain along area of cellulitis, IV Toradol ATC continued  IVABT in progress for same with +wound cultures  Minimal improvement noted to redness & rash       ED Triage Vitals   Temperature Pulse Respirations Blood Pressure SpO2   03/30/22 1028 03/30/22 1024 03/30/22 1028 03/30/22 1024 03/30/22 1024   98 9 °F (37 2 °C) 82 18 150/68 93 %      Temp src Heart Rate Source Patient Position - Orthostatic VS BP Location FiO2 (%)   -- 03/30/22 1028 03/30/22 1028 03/30/22 1028 --    Monitor Sitting Left arm       Pain Score       03/30/22 1028       No Pain          Wt Readings from Last 1 Encounters:   03/07/22 (!) 172 kg (380 lb)     Additional Vital Signs:   03/31/22 09:04:31 97 4 °F (36 3 °C) Abnormal  65 -- 152/74 100 92 % -- --   03/30/22 23:22:24 97 9 °F (36 6 °C) 67 18 136/64 88 91 % -- --   03/30/22 19:54:46 98 9 °F (37 2 °C) 78 19 135/74 94 85 % Abnormal  -- --   03/30/22 19:52:23 -- 84 18 135/74 94 88 % Abnormal  -- --   03/30/22 1950 -- -- -- -- -- -- None (Room air) --   03/30/22 15:19:29 98 4 °F (36 9 °C) 79 20 145/81 102 89 % Abnormal  -- --   03/30/22 1454 -- -- -- 173/93 Abnormal  125 -- -- --   03/30/22 1424 -- 84 26 Abnormal  186/80 Abnormal  115 95 % -- --   03/30/22 1354 -- 84 32 Abnormal  163/86 116 92 % -- --   03/30/22 1254 -- 72 20 183/95 Abnormal  132 93 % -- --   03/30/22 1224 -- 84 29 Abnormal  181/83 Abnormal  119 96 % -- --   03/30/22 1216 -- 82 22 188/98 Abnormal  134 94 % -- --     Pertinent Labs/Diagnostic Test Results:   CT abdomen pelvis w contrast   Final Result  (03/30 1617)      No discrete intra-abdominal or pelvic abscess  Skin thickening and inflammatory stranding in the lower anterior abdominal wall pannus suggestive of cellulitis  XR chest 1 view portable   Final Result  (03/30 1428)      No acute cardiopulmonary disease        VAS lower limb venous duplex study, unilateral/limited         3/31  Ekg=  Rhythm: sinus rhythm    Ectopy:     Ectopy: none    QRS:     QRS axis:  Normal    QRS intervals:  Normal  Conduction:     Conduction: normal    ST segments:     ST segments:  Abnormal  T waves:     T waves: normal   Results from last 7 days   Lab Units 03/30/22  1122   SARS-COV-2  Negative     Results from last 7 days   Lab Units 03/31/22  0458 03/30/22  1141   WBC Thousand/uL 7 35 8 23   HEMOGLOBIN g/dL 9 0* 9 8*   HEMATOCRIT % 32 6* 35 1*   PLATELETS Thousands/uL 295 342   NEUTROS ABS Thousands/µL 4 96 6 55     Results from last 7 days   Lab Units 03/31/22  0458 03/30/22  1141   SODIUM mmol/L 137 138   POTASSIUM mmol/L 4 3 4 1   CHLORIDE mmol/L 100 102   CO2 mmol/L 29 32   ANION GAP mmol/L 8 4   BUN mg/dL 20 16   CREATININE mg/dL 1 09 0 96   EGFR ml/min/1 73sq m 84 98   CALCIUM mg/dL 8 0* 8 1*   MAGNESIUM mg/dL 1 9  --      Results from last 7 days   Lab Units 03/31/22  0458 03/30/22  1141   AST U/L 19 25   ALT U/L 19 19   ALK PHOS U/L 99 98   TOTAL PROTEIN g/dL 7 5 8 2   ALBUMIN g/dL 2 2* 2 4*   TOTAL BILIRUBIN mg/dL 0 28 0 23     Results from last 7 days   Lab Units 03/31/22  0716 03/30/22  2111 03/30/22  1755 03/30/22  1112   POC GLUCOSE mg/dl 121 100 103 121     Results from last 7 days   Lab Units 03/31/22  0458 03/30/22  1141   GLUCOSE RANDOM mg/dL 115 126     Results from last 7 days   Lab Units 03/30/22  1141   HEMOGLOBIN A1C % 7 5*   EAG mg/dl 169     Results from last 7 days   Lab Units 03/30/22  1141   PROTIME seconds 13 2   INR  1 02   PTT seconds 29     Results from last 7 days   Lab Units 03/30/22  1141   TSH 3RD GENERATON uIU/mL 2 784     Results from last 7 days   Lab Units 03/30/22  1141   LACTIC ACID mmol/L 0 5     Results from last 7 days   Lab Units 03/30/22  1122   INFLUENZA A PCR  Negative   INFLUENZA B PCR  Negative   RSV PCR  Negative     Results from last 7 days   Lab Units 03/30/22  1700 03/30/22  1141   BLOOD CULTURE   --  Received in Microbiology Lab  Culture in Progress  Received in Microbiology Lab  Culture in Progress     Wound  GRAM STAIN RESULT  1+ Gram positive cocci in pairs*  1+ Polys*  --      ED Treatment:   Medication Administration from 03/30/2022 1009 to 03/30/2022 1517       Date/Time Order Dose Route Action     03/30/2022 1140 vancomycin (VANCOCIN) 1500 mg in sodium chloride 0 9% 250 mL IVPB 1,500 mg Intravenous New Bag     03/30/2022 1138 ketorolac (TORADOL) injection 15 mg 15 mg Intravenous Given     03/30/2022 1323 HYDROmorphone (DILAUDID) injection 1 mg 1 mg Intravenous Given     03/30/2022 1423 iohexol (OMNIPAQUE) 240 MG/ML solution 50 mL 50 mL Oral Given        Past Medical History:   Diagnosis Date    Arthritis     Breathing difficulty     Coronary artery disease     Diabetes mellitus ([facility] )     Heart disease     CAD   s/p ptca with 1 stent 2012    Hidradenitis suppurativa     groin    History of transfusion     Hyperlipidemia     Hypertension     MI (myocardial infarction) ([facility] )     " silent " M I  in the past    Morbid obesity with BMI of 50 0-59 9, adult ([place] [address] )     Nicotine dependence     Obesity     Pilonidal cyst      Present on Admission:   Coronary artery disease   Essential hypertension   Type 2 diabetes mellitus with diabetic polyneuropathy, without long-term current use of insulin ([facility]   Type 1 non-ST elevation myocardial infarction (NSTEMI) ([facility]   Nocturnal hypoxia   Cellulitis of multiple sites of trunk    Admitting Diagnosis: Cellulitis of abdominal wall [L03 311]  Panniculitis [M79 3]  Abdominal pain [R10 9]  Age/Sex: 37 y o  male  Admission Orders:  accuchecks  Wound care  Foot pumps    Scheduled Medications:  amLODIPine, 10 mg, Oral, Daily  aspirin, 81 mg, Oral, Daily  atorvastatin, 80 mg, Oral, Daily With Dinner  carvedilol, 6 25 mg, Oral, BID With Meals  cefazolin, 2,000 mg, Intravenous, Q8H  enoxaparin, 60 mg, Subcutaneous, Daily  insulin lispro, 2-12 Units, Subcutaneous, 4x Daily (AC & HS)  lisinopril, 10 mg, Oral, Daily  ketorolac, 15 mg, Intravenous, Q6H SHAZIA  nystatin, , Topical, TID  vancomycin, 15 mg/kg (Adjusted), Intravenous, Q8H    PRN Meds:  acetaminophen, 650 mg, Oral, Q6H PRN  albuterol, 2 puff, Inhalation, Q6H PRN  HYDROmorphone, 1 mg, Intravenous, Q4H PRN    Network Utilization Review Department  ATTENTION: Please call with any questions or concerns to [phone] and carefully listen to the prompts so that you are directed to the right person  All voicemails are confidential   Sera Da Silva all requests for admission clinical reviews, approved or denied determinations and any other requests to dedicated fax number below belonging to the campus where the patient is receiving treatment   List of dedicated fax numbers for the Facilities:  [address] DENIALS (Administrative/Medical Necessity) [phone]   [address] (Maternity/NICU/Pediatrics) [phone]   [address]  [address] [facility] [place] [address] [id number] [address] [facility] [place] [address] [phone]

## 2022-03-31 NOTE — PROGRESS NOTES
[facility] Progress Note - Storm Churchill 37 y o  male MRN: [id number]    Unit/Bed#: [address] [place] [address] Encounter: [id number]      Assessment/Plan:  * Cellulitis of multiple sites of trunk  Assessment & Plan  Patient with a history of multiple sites of cellulitis with last admission in 12/2021 presented with cellulitis of abdomen and pannus  Patient also has wound with some drainage  On previous admission, patient was started on Ancef 2 g q 8 and discharged on Keflex  Wound cultures grew beta-hemolytic strep,Staphylococcus haemolyticus which was susceptible to vancomycin    ED course:  Vancomycin    CT abdomen pelvis 3/30: No discrete intra-abdominal or pelvic abscess  Skin thickening and inflammatory stranding in the lower anterior abdominal wall pannus suggestive of cellulitis  Wound Culture 3/30: 1+ Gram positive cocci in pairs, 1+ Polys    Wound Care consulted   Nystatin powder b i d  For atrial title and abdominal pannus and groin area  Continue vancomycin and start Ancef 2 g  Continue to monitor expansion of cellulitis    Hidradenitis suppurativa  Assessment & Plan  Patient has history of hidranitis suppurativa with wounds currently present on left groin and abdominal pannus     Wound care consulted  Wound cultures pending   Daily dressings   Continue antibiotics as noted above  Swelling of left lower extremity  Assessment & Plan  Patient with a history of hidradenitis suprativa bilateral groin and thigh  Patient previously had cyst removed from his left thigh which required anesthesia  Patient has been following up with wound care weekly  Per patient, he is now to see them every 3 weeks  Patient's left lower extremity circumference is significantly increased compared to right    States that this is chronic problem and actually looks better than usual  Pt has history of lymphedema on left LE    Doppler ultrasound of left lower extremity to rule out DVT  Lovenox BID  Continue to monitor    Nocturnal hypoxia  Assessment & Plan  Oxygen saturation on admission 99% on room air  In 12/2021, patient had overnight O2 evaluation which showed significant hypoxemia with lowest desaturation of 59%  At that time, patient was advised to follow-up with pulmonology for complete sleep study  Will recommend pulmonology follow-up prior to discharge  Titrate oxygen as needed at night    Type 1 non-ST elevation myocardial infarction (NSTEMI) [facility])  Assessment & Plan  Patient has a history of MI with stent placement in 2001  Patient follows with Dr Emy Garcia outpatient    Patient currently on Coreg 6 25 mg b i d  Type 2 diabetes mellitus with diabetic polyneuropathy, without long-term current use of insulin ([facility] )  Assessment & Plan  Lab Results   Component Value Date    HGBA1C 7 0 (A) 11/15/2021       Recent Labs     03/30/22  1112   POCGLU 121       Blood Sugar Average: Last 72 hrs:  (P) 121     Last A1c 7 0 in 11/2021   Repeat A1c       Held home medications at this time   Will Keep monitoring glucose   ISS  And monitor for basal and meal time insuline requirement While hospitalized     Essential hypertension  Assessment & Plan  BP on admission 180s/90s    Continue home medications:  Amlodipine 10mg, carvedilol 6 25mg and lisinopril 10mg   Monitor BP     Coronary artery disease  Assessment & Plan  Patient with a history of cardiac catheterization in 2020 and 2010  Follows with cardiology outpatient  Denies any chest pain and shortness of breath on admission  Patient currently takes Coreg 6 25 mg b i d , ASA 81 mg daily  Patient reports not taking Brilinta    Repeat EKG  Continue home medications          Subjective:   Patient is sleeping in bed upon examination  He reports some headache with dizziness overnight as well as continued lower abdominal pain along his region of cellulitis which has not worsened since yesterday    He denies any fever, chills, sweats, chest pain, shortness of breath, nausea, vomiting, or diarrhea  Objective:     Vitals: Blood pressure 136/64, pulse 67, temperature 97 9 °F (36 6 °C), resp  rate 18, SpO2 91 %  ,There is no height or weight on file to calculate BMI  Wt Readings from Last 3 Encounters:   03/07/22 (!) 172 kg (380 lb)   12/02/21 (!) 177 kg (390 lb 10 5 oz)   11/15/21 (!) 176 kg (388 lb)     No intake or output data in the 24 hours ending 03/31/22 0751    Physical Exam:   Physical Exam  Constitutional:       General: He is not in acute distress  Appearance: He is obese  He is not ill-appearing  HENT:      Head: Normocephalic  Cardiovascular:      Rate and Rhythm: Normal rate and regular rhythm  Pulses: Normal pulses  Pulmonary:      Effort: Pulmonary effort is normal       Breath sounds: Normal breath sounds  Abdominal:      General: Bowel sounds are normal       Tenderness: There is abdominal tenderness  There is no guarding or rebound  Musculoskeletal:      Right lower leg: Edema (Bilateral edema left worse than right) present  Left lower leg: Edema present  Skin:     Capillary Refill: Capillary refill takes less than 2 seconds  Findings: Erythema ( erythema of left lower limb has significantly reduced since yesterday) and rash ( cellulitic region on lower abdomen which has receded slightly since yesterday ) present  Neurological:      Mental Status: He is alert and oriented to person, place, and time     Psychiatric:         Mood and Affect: Mood normal          Behavior: Behavior normal            Recent Results (from the past 24 hour(s))   Fingerstick Glucose (POCT)    Collection Time: 03/30/22 11:12 AM   Result Value Ref Range    POC Glucose 121 65 - 140 mg/dl   COVID/FLU/RSV - 2 hour TAT    Collection Time: 03/30/22 11:22 AM    Specimen: Nasopharyngeal Swab; Nares   Result Value Ref Range    SARS-CoV-2 Negative Negative    INFLUENZA A PCR Negative Negative    INFLUENZA B PCR Negative Negative    RSV PCR Negative Negative   CBC and differential    Collection Time: 03/30/22 11:41 AM   Result Value Ref Range    WBC 8 23 4 31 - 10 16 Thousand/uL    RBC 4 44 3 88 - 5 62 Million/uL    Hemoglobin 9 8 (L) 12 0 - 17 0 g/dL    Hematocrit 35 1 (L) 36 5 - 49 3 %    MCV 79 (L) 82 - 98 fL    MCH 22 1 (L) 26 8 - 34 3 pg    MCHC 27 9 (L) 31 4 - 37 4 g/dL    RDW 16 7 (H) 11 6 - 15 1 %    MPV 9 0 8 9 - 12 7 fL    Platelets 967 271 - 080 Thousands/uL    nRBC 0 /100 WBCs    Neutrophils Relative 80 (H) 43 - 75 %    Immat GRANS % 0 0 - 2 %    Lymphocytes Relative 10 (L) 14 - 44 %    Monocytes Relative 9 4 - 12 %    Eosinophils Relative 1 0 - 6 %    Basophils Relative 0 0 - 1 %    Neutrophils Absolute 6 55 1 85 - 7 62 Thousands/µL    Immature Grans Absolute 0 02 0 00 - 0 20 Thousand/uL    Lymphocytes Absolute 0 81 0 60 - 4 47 Thousands/µL    Monocytes Absolute 0 71 0 17 - 1 22 Thousand/µL    Eosinophils Absolute 0 11 0 00 - 0 61 Thousand/µL    Basophils Absolute 0 03 0 00 - 0 10 Thousands/µL   Protime-INR    Collection Time: 03/30/22 11:41 AM   Result Value Ref Range    Protime 13 2 11 6 - 14 5 seconds    INR 1 02 0 84 - 1 19   APTT    Collection Time: 03/30/22 11:41 AM   Result Value Ref Range    PTT 29 23 - 37 seconds   Comprehensive metabolic panel    Collection Time: 03/30/22 11:41 AM   Result Value Ref Range    Sodium 138 136 - 145 mmol/L    Potassium 4 1 3 5 - 5 3 mmol/L    Chloride 102 100 - 108 mmol/L    CO2 32 21 - 32 mmol/L    ANION GAP 4 4 - 13 mmol/L    BUN 16 5 - 25 mg/dL    Creatinine 0 96 0 60 - 1 30 mg/dL    Glucose 126 65 - 140 mg/dL    Calcium 8 1 (L) 8 3 - 10 1 mg/dL    Corrected Calcium 9 4 8 3 - 10 1 mg/dL    AST 25 5 - 45 U/L    ALT 19 12 - 78 U/L    Alkaline Phosphatase 98 46 - 116 U/L    Total Protein 8 2 6 4 - 8 2 g/dL    Albumin 2 4 (L) 3 5 - 5 0 g/dL    Total Bilirubin 0 23 0 20 - 1 00 mg/dL    eGFR 98 ml/min/1 73sq m   Blood culture #1    Collection Time: 03/30/22 11:41 AM    Specimen: Arm, Right; Blood   Result Value Ref Range    Blood Culture Received in Microbiology Lab  Culture in Progress  Blood culture #2    Collection Time: 03/30/22 11:41 AM    Specimen: Arm, Right; Blood   Result Value Ref Range    Blood Culture Received in Microbiology Lab  Culture in Progress  Lactic acid    Collection Time: 03/30/22 11:41 AM   Result Value Ref Range    LACTIC ACID 0 5 0 5 - 2 0 mmol/L   Lipid Panel with Direct LDL reflex    Collection Time: 03/30/22 11:41 AM   Result Value Ref Range    Cholesterol 109 See Comment mg/dL    Triglycerides 83 See Comment mg/dL    HDL, Direct 29 (L) >=40 mg/dL    LDL Calculated 63 0 - 100 mg/dL   Hemoglobin A1C    Collection Time: 03/30/22 11:41 AM   Result Value Ref Range    Hemoglobin A1C 7 5 (H) Normal 3 8-5 6%; PreDiabetic 5 7-6 4%;  Diabetic >=6 5%; Glycemic control for adults with diabetes <7 0% %     mg/dl   TSH, 3rd generation    Collection Time: 03/30/22 11:41 AM   Result Value Ref Range    TSH 3RD GENERATON 2 784 0 358 - 3 740 uIU/mL   Wound culture and Gram stain    Collection Time: 03/30/22  5:00 PM    Specimen: Abdominal; Wound   Result Value Ref Range    Gram Stain Result 1+ Gram positive cocci in pairs (A)     Gram Stain Result 1+ Polys (A)    Fingerstick Glucose (POCT)    Collection Time: 03/30/22  5:55 PM   Result Value Ref Range    POC Glucose 103 65 - 140 mg/dl   Fingerstick Glucose (POCT)    Collection Time: 03/30/22  9:11 PM   Result Value Ref Range    POC Glucose 100 65 - 140 mg/dl   CBC and differential    Collection Time: 03/31/22  4:58 AM   Result Value Ref Range    WBC 7 35 4 31 - 10 16 Thousand/uL    RBC 4 09 3 88 - 5 62 Million/uL    Hemoglobin 9 0 (L) 12 0 - 17 0 g/dL    Hematocrit 32 6 (L) 36 5 - 49 3 %    MCV 80 (L) 82 - 98 fL    MCH 22 0 (L) 26 8 - 34 3 pg    MCHC 27 6 (L) 31 4 - 37 4 g/dL    RDW 16 3 (H) 11 6 - 15 1 %    MPV 8 5 (L) 8 9 - 12 7 fL    Platelets 769 230 - 581 Thousands/uL    nRBC 0 /100 WBCs    Neutrophils Relative 68 43 - 75 %    Immat GRANS % 0 0 - 2 % Lymphocytes Relative 18 14 - 44 %    Monocytes Relative 11 4 - 12 %    Eosinophils Relative 3 0 - 6 %    Basophils Relative 0 0 - 1 %    Neutrophils Absolute 4 96 1 85 - 7 62 Thousands/µL    Immature Grans Absolute 0 02 0 00 - 0 20 Thousand/uL    Lymphocytes Absolute 1 30 0 60 - 4 47 Thousands/µL    Monocytes Absolute 0 82 0 17 - 1 22 Thousand/µL    Eosinophils Absolute 0 23 0 00 - 0 61 Thousand/µL    Basophils Absolute 0 02 0 00 - 0 10 Thousands/µL   Comprehensive metabolic panel    Collection Time: 03/31/22  4:58 AM   Result Value Ref Range    Sodium 137 136 - 145 mmol/L    Potassium 4 3 3 5 - 5 3 mmol/L    Chloride 100 100 - 108 mmol/L    CO2 29 21 - 32 mmol/L    ANION GAP 8 4 - 13 mmol/L    BUN 20 5 - 25 mg/dL    Creatinine 1 09 0 60 - 1 30 mg/dL    Glucose 115 65 - 140 mg/dL    Calcium 8 0 (L) 8 3 - 10 1 mg/dL    Corrected Calcium 9 4 8 3 - 10 1 mg/dL    AST 19 5 - 45 U/L    ALT 19 12 - 78 U/L    Alkaline Phosphatase 99 46 - 116 U/L    Total Protein 7 5 6 4 - 8 2 g/dL    Albumin 2 2 (L) 3 5 - 5 0 g/dL    Total Bilirubin 0 28 0 20 - 1 00 mg/dL    eGFR 84 ml/min/1 73sq m   Magnesium    Collection Time: 03/31/22  4:58 AM   Result Value Ref Range    Magnesium 1 9 1 6 - 2 6 mg/dL   Fingerstick Glucose (POCT)    Collection Time: 03/31/22  7:16 AM   Result Value Ref Range    POC Glucose 121 65 - 140 mg/dl       Current Facility-Administered Medications   Medication Dose Route Frequency Provider Last Rate Last Admin    acetaminophen (TYLENOL) tablet 650 mg  650 mg Oral Q6H PRN Jodee Santillan MD        albuterol (PROVENTIL HFA,VENTOLIN HFA) inhaler 2 puff  2 puff Inhalation Q6H PRN Janice Macario DO        amLODIPine (NORVASC) tablet 10 mg  10 mg Oral Daily Janice Macario DO   10 mg at 03/30/22 1751    aspirin (ECOTRIN LOW STRENGTH) EC tablet 81 mg  81 mg Oral Daily Janice Macario DO   81 mg at 03/30/22 1751    atorvastatin (LIPITOR) tablet 80 mg  80 mg Oral Daily With DO Jeremie   80 mg at 03/30/22 1751    carvedilol (COREG) tablet 6 25 mg  6 25 mg Oral BID With Meals [facility], DO   6 25 mg at 03/30/22 1756    ceFAZolin (ANCEF) IVPB (premix in dextrose) 2,000 mg 50 mL  2,000 mg Intravenous Q8H [facility],  mL/hr at 03/31/22 0550 2,000 mg at 03/31/22 0550    enoxaparin (LOVENOX) subcutaneous injection 60 mg  60 mg Subcutaneous Daily Deana Garcia MD   60 mg at 03/30/22 1750    HYDROmorphone (DILAUDID) injection 1 mg  1 mg Intravenous Q4H PRN [facility], DO   1 mg at 03/31/22 0437    insulin lispro (HumaLOG) 100 units/mL subcutaneous injection 2-12 Units  2-12 Units Subcutaneous 4x Daily (AC & HS) [facility], DO        ketorolac (TORADOL) injection 15 mg  15 mg Intravenous Q6H [address] Cata Eduin, DO   15 mg at 03/31/22 0550    lisinopril (ZESTRIL) tablet 10 mg  10 mg Oral Daily [facility], DO   10 mg at 03/30/22 1751    nystatin (MYCOSTATIN) powder   Topical TID [facility], DO   Given at 03/30/22 2026    vancomycin (VANCOCIN) 1500 mg in sodium chloride 0 9% 250 mL IVPB  15 mg/kg (Adjusted) Intravenous Q8H Pa Coates MD   1,500 mg at 03/31/22 0209       Invasive Devices  Report    Peripheral Intravenous Line            Peripheral IV 03/30/22 Distal;Right;Upper;Ventral (anterior) Arm <1 day                Lab, Imaging and other studies: I have personally reviewed pertinent reports      VTE Pharmacologic Prophylaxis: Enoxaparin (Lovenox)  VTE Mechanical Prophylaxis: reason for no mechanical VTE prophylaxis Lower limb cellulitis    Deana Garcia MD

## 2022-03-31 NOTE — PROGRESS NOTES
Vancomycin Assessment    Edith Hartley is a 37 y o  male who is currently receiving vancomycin vancomycin 2500mg q12h for MRSA suspected,skin-soft tissue infection     Relevant clinical data and objective history reviewed:  Creatinine   Date Value Ref Range Status   03/30/2022 0 96 0 60 - 1 30 mg/dL Final     Comment:     Standardized to IDMS reference method   12/05/2021 0 96 0 60 - 1 30 mg/dL Final     Comment:     Standardized to IDMS reference method   12/04/2021 1 14 0 60 - 1 30 mg/dL Final     Comment:     Standardized to IDMS reference method   12/29/2016 1 07 0 76 - 1 27 mg/dL Final     /64   Pulse 67   Temp 97 9 °F (36 6 °C)   Resp 18   SpO2 91%   No intake/output data recorded  Lab Results   Component Value Date/Time    BUN 16 03/30/2022 11:41 AM    BUN 19 12/29/2016 11:35 AM    WBC 8 23 03/30/2022 11:41 AM    WBC 8 1 01/23/2017 10:55 AM    HGB 9 8 (L) 03/30/2022 11:41 AM    HGB 14 5 01/23/2017 10:55 AM    HCT 35 1 (L) 03/30/2022 11:41 AM    HCT 43 8 01/23/2017 10:55 AM    MCV 79 (L) 03/30/2022 11:41 AM    MCV 89 01/23/2017 10:55 AM     03/30/2022 11:41 AM     01/23/2017 10:55 AM     Temp Readings from Last 3 Encounters:   03/30/22 97 9 °F (36 6 °C)   03/21/22 98 °F (36 7 °C)   03/14/22 98 6 °F (37 °C)     Vancomycin Days of Therapy: 1    Assessment/Plan  The patient is currently on vancomycin utilizing scheduled dosing based on adjusted body weight (due to obesity)  The patient is currently ordered vancomycin 2500mg q12h and after clinical evaluation will be changed to vancomycin 1500mg q8h  Pharmacy will also follow closely for s/sx of nephrotoxicity, infusion reactions, and appropriateness of therapy  BMP and CBC will be ordered per protocol  Plan for trough as patient approaches steady state, prior to the 4th  dose at approximately 1800 3/31  Due to infection severity, will target a trough of 15-20 (appropriate for most indications)     Pharmacy will continue to follow the patients culture results and clinical progress daily      Fredy Deng, Pharmacist

## 2022-04-01 PROBLEM — D50.9 MICROCYTIC ANEMIA: Status: ACTIVE | Noted: 2022-04-01

## 2022-04-01 LAB
ALBUMIN SERPL BCP-MCNC: 2.3 G/DL (ref 3.5–5)
ALP SERPL-CCNC: 125 U/L (ref 46–116)
ALT SERPL W P-5'-P-CCNC: 16 U/L (ref 12–78)
ANION GAP SERPL CALCULATED.3IONS-SCNC: 13 MMOL/L (ref 4–13)
AST SERPL W P-5'-P-CCNC: 22 U/L (ref 5–45)
ATRIAL RATE: 75 BPM
BILIRUB SERPL-MCNC: 0.28 MG/DL (ref 0.2–1)
BUN SERPL-MCNC: 27 MG/DL (ref 5–25)
CALCIUM ALBUM COR SERPL-MCNC: 9.6 MG/DL (ref 8.3–10.1)
CALCIUM SERPL-MCNC: 8.2 MG/DL (ref 8.3–10.1)
CHLORIDE SERPL-SCNC: 99 MMOL/L (ref 100–108)
CO2 SERPL-SCNC: 22 MMOL/L (ref 21–32)
CREAT SERPL-MCNC: 1.13 MG/DL (ref 0.6–1.3)
ERYTHROCYTE [DISTWIDTH] IN BLOOD BY AUTOMATED COUNT: 16.2 % (ref 11.6–15.1)
FERRITIN SERPL-MCNC: 38 NG/ML (ref 8–388)
GFR SERPL CREATININE-BSD FRML MDRD: 80 ML/MIN/1.73SQ M
GLUCOSE SERPL-MCNC: 122 MG/DL (ref 65–140)
GLUCOSE SERPL-MCNC: 123 MG/DL (ref 65–140)
GLUCOSE SERPL-MCNC: 123 MG/DL (ref 65–140)
GLUCOSE SERPL-MCNC: 124 MG/DL (ref 65–140)
GLUCOSE SERPL-MCNC: 160 MG/DL (ref 65–140)
HCT VFR BLD AUTO: 31.5 % (ref 36.5–49.3)
HGB BLD-MCNC: 9.1 G/DL (ref 12–17)
IRON SATN MFR SERPL: 10 % (ref 20–50)
IRON SERPL-MCNC: 37 UG/DL (ref 65–175)
MCH RBC QN AUTO: 22 PG (ref 26.8–34.3)
MCHC RBC AUTO-ENTMCNC: 28.9 G/DL (ref 31.4–37.4)
MCV RBC AUTO: 76 FL (ref 82–98)
PLATELET # BLD AUTO: 333 THOUSANDS/UL (ref 149–390)
PMV BLD AUTO: 9.2 FL (ref 8.9–12.7)
POTASSIUM SERPL-SCNC: 4.6 MMOL/L (ref 3.5–5.3)
PROT SERPL-MCNC: 7.9 G/DL (ref 6.4–8.2)
QRS AXIS: -6 DEGREES
QRSD INTERVAL: 80 MS
QT INTERVAL: 378 MS
QTC INTERVAL: 425 MS
RBC # BLD AUTO: 4.14 MILLION/UL (ref 3.88–5.62)
SODIUM SERPL-SCNC: 134 MMOL/L (ref 136–145)
T WAVE AXIS: 4 DEGREES
TIBC SERPL-MCNC: 354 UG/DL (ref 250–450)
VENTRICULAR RATE: 76 BPM
WBC # BLD AUTO: 6.65 THOUSAND/UL (ref 4.31–10.16)

## 2022-04-01 PROCEDURE — 99232 SBSQ HOSP IP/OBS MODERATE 35: CPT | Performed by: FAMILY MEDICINE

## 2022-04-01 PROCEDURE — 83540 ASSAY OF IRON: CPT | Performed by: STUDENT IN AN ORGANIZED HEALTH CARE EDUCATION/TRAINING PROGRAM

## 2022-04-01 PROCEDURE — 83550 IRON BINDING TEST: CPT | Performed by: STUDENT IN AN ORGANIZED HEALTH CARE EDUCATION/TRAINING PROGRAM

## 2022-04-01 PROCEDURE — 93010 ELECTROCARDIOGRAM REPORT: CPT | Performed by: INTERNAL MEDICINE

## 2022-04-01 PROCEDURE — 82948 REAGENT STRIP/BLOOD GLUCOSE: CPT

## 2022-04-01 PROCEDURE — 82728 ASSAY OF FERRITIN: CPT | Performed by: STUDENT IN AN ORGANIZED HEALTH CARE EDUCATION/TRAINING PROGRAM

## 2022-04-01 PROCEDURE — 80053 COMPREHEN METABOLIC PANEL: CPT

## 2022-04-01 PROCEDURE — 85025 COMPLETE CBC W/AUTO DIFF WBC: CPT

## 2022-04-01 PROCEDURE — 99222 1ST HOSP IP/OBS MODERATE 55: CPT | Performed by: SURGERY

## 2022-04-01 RX ORDER — KETOROLAC TROMETHAMINE 30 MG/ML
30 INJECTION, SOLUTION INTRAMUSCULAR; INTRAVENOUS EVERY 6 HOURS SCHEDULED
Status: COMPLETED | OUTPATIENT
Start: 2022-04-01 | End: 2022-04-02

## 2022-04-01 RX ADMIN — Medication 1500 MG: at 10:25

## 2022-04-01 RX ADMIN — KETOROLAC TROMETHAMINE 30 MG: 30 INJECTION, SOLUTION INTRAMUSCULAR at 13:50

## 2022-04-01 RX ADMIN — KETOROLAC TROMETHAMINE 15 MG: 30 INJECTION, SOLUTION INTRAMUSCULAR at 05:16

## 2022-04-01 RX ADMIN — HYDROMORPHONE HYDROCHLORIDE 2 MG: 2 INJECTION, SOLUTION INTRAMUSCULAR; INTRAVENOUS; SUBCUTANEOUS at 21:12

## 2022-04-01 RX ADMIN — HYDROMORPHONE HYDROCHLORIDE 2 MG: 2 INJECTION, SOLUTION INTRAMUSCULAR; INTRAVENOUS; SUBCUTANEOUS at 04:47

## 2022-04-01 RX ADMIN — CEFAZOLIN SODIUM 2000 MG: 2 SOLUTION INTRAVENOUS at 13:00

## 2022-04-01 RX ADMIN — CARVEDILOL 6.25 MG: 6.25 TABLET, FILM COATED ORAL at 07:44

## 2022-04-01 RX ADMIN — KETOROLAC TROMETHAMINE 30 MG: 30 INJECTION, SOLUTION INTRAMUSCULAR at 18:49

## 2022-04-01 RX ADMIN — ASPIRIN 81 MG: 81 TABLET, COATED ORAL at 08:42

## 2022-04-01 RX ADMIN — Medication 1500 MG: at 18:49

## 2022-04-01 RX ADMIN — ENOXAPARIN SODIUM 60 MG: 60 INJECTION SUBCUTANEOUS at 08:42

## 2022-04-01 RX ADMIN — Medication 1500 MG: at 02:46

## 2022-04-01 RX ADMIN — HYDROMORPHONE HYDROCHLORIDE 2 MG: 2 INJECTION, SOLUTION INTRAMUSCULAR; INTRAVENOUS; SUBCUTANEOUS at 17:06

## 2022-04-01 RX ADMIN — CEFAZOLIN SODIUM 2000 MG: 2 SOLUTION INTRAVENOUS at 05:12

## 2022-04-01 RX ADMIN — INSULIN LISPRO 2 UNITS: 100 INJECTION, SOLUTION INTRAVENOUS; SUBCUTANEOUS at 12:53

## 2022-04-01 RX ADMIN — HYDROMORPHONE HYDROCHLORIDE 2 MG: 2 INJECTION, SOLUTION INTRAMUSCULAR; INTRAVENOUS; SUBCUTANEOUS at 12:56

## 2022-04-01 RX ADMIN — HYDROMORPHONE HYDROCHLORIDE 2 MG: 2 INJECTION, SOLUTION INTRAMUSCULAR; INTRAVENOUS; SUBCUTANEOUS at 08:47

## 2022-04-01 RX ADMIN — ATORVASTATIN CALCIUM 80 MG: 80 TABLET, FILM COATED ORAL at 17:06

## 2022-04-01 RX ADMIN — LISINOPRIL 10 MG: 10 TABLET ORAL at 08:42

## 2022-04-01 RX ADMIN — CARVEDILOL 6.25 MG: 6.25 TABLET, FILM COATED ORAL at 17:07

## 2022-04-01 RX ADMIN — CEFAZOLIN SODIUM 2000 MG: 2 SOLUTION INTRAVENOUS at 21:12

## 2022-04-01 RX ADMIN — AMLODIPINE BESYLATE 10 MG: 10 TABLET ORAL at 08:42

## 2022-04-01 RX ADMIN — HYDROMORPHONE HYDROCHLORIDE 2 MG: 2 INJECTION, SOLUTION INTRAMUSCULAR; INTRAVENOUS; SUBCUTANEOUS at 00:36

## 2022-04-01 NOTE — PROGRESS NOTES
[facility] Progress Note - Conor Humphrey 37 y o  male MRN: [id number]    Unit/Bed#: [address] [place] [address] Encounter: [id number]      Assessment/Plan:  * Cellulitis of multiple sites of trunk  Assessment & Plan  Patient with a history of multiple sites of cellulitis with last admission in 12/2021 presented with cellulitis of abdomen and pannus  Patient also has wound with some drainage  On previous admission, patient was started on Ancef 2 g q 8 and discharged on Keflex  Wound cultures grew beta-hemolytic strep,Staphylococcus haemolyticus which was susceptible to vancomycin    ED course:  Vancomycin    CT abdomen pelvis 3/30: No discrete intra-abdominal or pelvic abscess  Skin thickening and inflammatory stranding in the lower anterior abdominal wall pannus suggestive of cellulitis  Wound Culture 3/30: 1+ Gram positive cocci in pairs, 1+ Polys    Wound Care consulted   Nystatin powder b i d  For atrial title and abdominal pannus and groin area  Continue vancomycin and start Ancef 2 g  Continue to monitor expansion of cellulitis    Hidradenitis suppurativa  Assessment & Plan  Patient has history of hidranitis suppurativa with wounds currently present on left groin and abdominal pannus     Wound care consulted  Wound cultures pending   Daily dressings   Continue antibiotics as noted above  Swelling of left lower extremity  Assessment & Plan  Patient with a history of hidradenitis suprativa bilateral groin and thigh  Patient previously had cyst removed from his left thigh which required anesthesia  Patient has been following up with wound care weekly  Per patient, he is now to see them every 3 weeks  Patient's left lower extremity circumference is significantly increased compared to right    States that this is chronic problem and actually looks better than usual  Pt has history of lymphedema on left LE    Doppler ultrasound of left lower extremity to rule out DVT  Lovenox BID  Continue to monitor    Nocturnal hypoxia  Assessment & Plan  Oxygen saturation on admission 99% on room air  In 12/2021, patient had overnight O2 evaluation which showed significant hypoxemia with lowest desaturation of 59%  At that time, patient was advised to follow-up with pulmonology for complete sleep study  Will recommend pulmonology follow-up prior to discharge  Titrate oxygen as needed at night    Type 1 non-ST elevation myocardial infarction (NSTEMI) [facility])  Assessment & Plan  Patient has a history of MI with stent placement in 2001  Patient follows with Dr Amy Villegas outpatient    Patient currently on Coreg 6 25 mg b i d  Type 2 diabetes mellitus with diabetic polyneuropathy, without long-term current use of insulin [facility])  Assessment & Plan  Lab Results   Component Value Date    HGBA1C 7 5 (H) 03/30/2022       Recent Labs     03/31/22  0716 03/31/22  1106 03/31/22  1656 03/31/22  2153   POCGLU 121 175* 135 109       Blood Sugar Average: Last 72 hrs:  (P) 123 [id number]     Last A1c 7 0 in 11/2021   Repeat A1c 7 5    Held home medications at this time   Will Keep monitoring glucose   ISS  And monitor for basal and meal time insuline requirement While hospitalized     Essential hypertension  Assessment & Plan  BP on admission 180s/90s    Continue home medications:  Amlodipine 10mg, carvedilol 6 25mg and lisinopril 10mg   Monitor BP     Coronary artery disease  Assessment & Plan  Patient with a history of cardiac catheterization in 2020 and 2010  Follows with cardiology outpatient  Denies any chest pain and shortness of breath on admission  Patient currently takes Coreg 6 25 mg b i d , ASA 81 mg daily  Patient reports not taking Brilinta    Repeat EKG  Continue home medications          Subjective:   Patient lying comfortably in bed upon examination    He reports continued lower abdominal pain over his area of cellulitis as well as continued pustulant discharge from the area of induration under his pannus and is wondering if that could be drained  He reported some chills overnight  He was aware of concerns about his oxygen desaturation, but denies ever feeling short of breath  He reports some continued nausea, but no vomiting  Denies any headache, chest pain or diarrhea  Objective:     Vitals: Blood pressure 150/80, pulse 57, temperature 98 °F (36 7 °C), resp  rate 18, SpO2 90 %  ,There is no height or weight on file to calculate BMI  Wt Readings from Last 3 Encounters:   03/07/22 (!) 172 kg (380 lb)   12/02/21 (!) 177 kg (390 lb 10 5 oz)   11/15/21 (!) 176 kg (388 lb)     No intake or output data in the 24 hours ending 04/01/22 0730    Physical Exam:  Physical Exam  Constitutional:       General: He is not in acute distress  Appearance: He is obese  Cardiovascular:      Rate and Rhythm: Normal rate and regular rhythm  Pulmonary:      Effort: Pulmonary effort is normal       Breath sounds: Normal breath sounds  Abdominal:      General: Bowel sounds are normal       Tenderness: There is abdominal tenderness (Over cellulitic area)  Skin:     Capillary Refill: Capillary refill takes less than 2 seconds  Neurological:      Mental Status: He is alert and oriented to person, place, and time  Recent Results (from the past 24 hour(s))   Fingerstick Glucose (POCT)    Collection Time: 03/31/22 11:06 AM   Result Value Ref Range    POC Glucose 175 (H) 65 - 140 mg/dl   Fingerstick Glucose (POCT)    Collection Time: 03/31/22  4:56 PM   Result Value Ref Range    POC Glucose 135 65 - 140 mg/dl   Vancomycin, trough Please draw trough peripherally prior to 1830 dose; [address] [place] [address] with results      Collection Time: 03/31/22  7:00 PM   Result Value Ref Range    Vancomycin Tr 16 1 10 0 - 20 0 ug/mL   Fingerstick Glucose (POCT)    Collection Time: 03/31/22  9:53 PM   Result Value Ref Range    POC Glucose 109 65 - 140 mg/dl   Comprehensive metabolic panel    Collection Time: 04/01/22  5:11 AM   Result Value Ref Range    Sodium 134 (L) 136 - 145 mmol/L    Potassium 4 6 3 5 - 5 3 mmol/L    Chloride 99 (L) 100 - 108 mmol/L    CO2 22 21 - 32 mmol/L    ANION GAP 13 4 - 13 mmol/L    BUN 27 (H) 5 - 25 mg/dL    Creatinine 1 13 0 60 - 1 30 mg/dL    Glucose 123 65 - 140 mg/dL    Calcium 8 2 (L) 8 3 - 10 1 mg/dL    Corrected Calcium 9 6 8 3 - 10 1 mg/dL    AST 22 5 - 45 U/L    ALT 16 12 - 78 U/L    Alkaline Phosphatase 125 (H) 46 - 116 U/L    Total Protein 7 9 6 4 - 8 2 g/dL    Albumin 2 3 (L) 3 5 - 5 0 g/dL    Total Bilirubin 0 28 0 20 - 1 00 mg/dL    eGFR 80 ml/min/1 73sq m       Current Facility-Administered Medications   Medication Dose Route Frequency Provider Last Rate Last Admin    acetaminophen (TYLENOL) tablet 650 mg  650 mg Oral Q6H PRN Kenneth Michel MD        albuterol (PROVENTIL HFA,VENTOLIN HFA) inhaler 2 puff  2 puff Inhalation Q6H PRN Davee Crease, DO        amLODIPine (NORVASC) tablet 10 mg  10 mg Oral Daily Jaylanee Crease, DO   10 mg at 03/31/22 8269    aspirin (ECOTRIN LOW STRENGTH) EC tablet 81 mg  81 mg Oral Daily Jaylanee Crease, DO   81 mg at 03/31/22 0847    atorvastatin (LIPITOR) tablet 80 mg  80 mg Oral Daily With [facility], DO   80 mg at 03/31/22 1746    carvedilol (COREG) tablet 6 25 mg  6 25 mg Oral BID With Meals Brandi Crease, DO   6 25 mg at 03/31/22 1746    ceFAZolin (ANCEF) IVPB (premix in dextrose) 2,000 mg 50 mL  2,000 mg Intravenous Q8H Cata Eduin,  mL/hr at 04/01/22 0512 2,000 mg at 04/01/22 0512    enoxaparin (LOVENOX) subcutaneous injection 60 mg  60 mg Subcutaneous Daily Kiley Tubbs MD   60 mg at 03/31/22 0906    HYDROmorphone (DILAUDID) injection 2 mg  2 mg Intravenous Q4H PRN Kiley Tubbs MD   2 mg at 04/01/22 0447    insulin lispro (HumaLOG) 100 units/mL subcutaneous injection 2-12 Units  2-12 Units Subcutaneous 4x Daily (AC & HS) Brandi Lara DO   2 Units at 03/31/22 1159    ketorolac (TORADOL) injection 15 mg  15 mg Intravenous Q6H 208 Elias Rodriguez MD   15 mg at 04/01/22 0516    lisinopril (ZESTRIL) tablet 10 mg  10 mg Oral Daily Jo-Ann Scale, DO   10 mg at 03/31/22 7949    nystatin (MYCOSTATIN) powder   Topical TID Jo-Ann Scale, DO   Given at 03/30/22 2026    vancomycin (VANCOCIN) 1500 mg in sodium chloride 0 9% 250 mL IVPB  15 mg/kg (Adjusted) Intravenous Q8H Miguelina Gloria  7 mL/hr at 03/31/22 1902 1,500 mg at 04/01/22 0246       Invasive Devices  Report    Peripheral Intravenous Line            Peripheral IV 03/30/22 Distal;Right;Upper;Ventral (anterior) Arm 1 day                Lab, Imaging and other studies: I have personally reviewed pertinent reports      VTE Pharmacologic Prophylaxis: Enoxaparin (Lovenox)      Julius Blizzard, MD

## 2022-04-01 NOTE — CONSULTS
Consultation - General Surgery   Miquel Adhikari 37 y o  male MRN: [id number]  Unit/Bed#: [address] [facility] 404-01 Encounter: [id number]    Assessment/Plan     Assessment:  43yo male with recurrent panniculitis and induration of lower left pannus  AVSS  No fluctuance noted  No leukocytosis  Hgb A1c this admission was 7 5        Plan:  I was under the impression an ultrasound was ordered, however I dont see it in the chart  Can likely forego at this point  Diagnostic needle aspiration was attempted at bedside without purulence expressed  Patient's best avenues for improvement are weight loss and blood glucose control  Patient understands this  He states he is undergoing workup for bariatric surgery  He is on vancomycin day 3  Antibiotic course and pain control per primary team   If diagnosed with hidradenitis suppurativa in the past, consider dermatology follow up as an outpatient      History of Present Illness     HPI:  Miquel Adhikari is a 37 y o  male with history of morbid obesity, non insulin dependent DM type 2, hidradenitis suppurativa, CAD, and past smoker who presents with recurrent cellulitis and drainage from his pannus  He reports the discharge is sometimes serous and sometimes purulent  Patient reports the lesions he had in December 2021 completely healed but now he has some draining on the other side of his pannus  He has completely quit smoking since then  He has been seeing bariatric surgery, nutritionists, and therapists in prep for weight loss surgery  He takes two showers per day and tries to keep the underside of his pannus dry  Inpatient consult to Acute Care Surgery  Consult performed by: Sherin Rebolledo PA-C  Consult ordered by: Gustavo Colindres DO          Review of Systems   Constitutional: Negative for appetite change, chills, fatigue and fever  HENT: Negative for congestion, rhinorrhea, sneezing, sore throat and trouble swallowing  Eyes: Negative      Respiratory: Negative for cough, chest tightness, shortness of breath and wheezing  Cardiovascular: Negative for chest pain, palpitations and leg swelling  Gastrointestinal: Negative for abdominal pain, blood in stool, diarrhea, nausea and vomiting  Genitourinary: Negative for difficulty urinating, dysuria, flank pain and hematuria  Musculoskeletal: Positive for joint swelling (chronic LLE)  Skin: Positive for color change, rash and wound  Neurological: Negative for dizziness, syncope, weakness, light-headedness and headaches  Hematological: Negative  Psychiatric/Behavioral: Negative  Historical Information   Past Medical History:   Diagnosis Date    Arthritis     Breathing difficulty     Coronary artery disease     Diabetes mellitus ([facility] )     Heart disease     CAD   s/p ptca with 1 stent 2012    Hidradenitis suppurativa     groin    History of transfusion     Hyperlipidemia     Hypertension     [facility]     " silent " M I  in the past    Morbid obesity with BMI of 50 0-59 9, adult ([facility] )     Nicotine dependence     Obesity     Pilonidal cyst      Past Surgical History:   Procedure Laterality Date    CARDIAC SURGERY      CORONARY ANGIOPLASTY WITH STENT PLACEMENT      INCISION AND DRAINAGE OF WOUND N/A 1/24/2017    Procedure: INCISION AND DRAINAGE (I&D) BUTTOCK, PILONIDAL CYST;  Surgeon: Mendel Brunette, MD;  Location: [address] [place] [address];  Service:    [facility] LEG SURGERY Left     I&D of left leg 2012    MN DEBRIDEMENT, SKIN, SUB-Q TISSUE,=<20 SQ CM Left 7/6/2021    Procedure: DEBRIDEMENT WOUND [facility] OUT);   Surgeon: Ritika Viveros MD;  [facility];  Service: General    MN EXC SKIN BENIG >4 CM TRUNK,ARM,LEG Left 4/6/2021    Procedure: EXCISION THIGH MASS;  Surgeon: Ritika Viveros MD;  [facility];  Service: General    MN EXC SWEAT GLAND Bonnye Dense Left 7/6/2021    Procedure: EXCISION PERINEAL ABSCESS LEFT THIGH;  Surgeon: Ritika Viveros MD;  [facility];  Service: General    CA NEG PRESS WOUND TX, < 50 CM Left 2021    Procedure: APPLICATION VAC DRESSING THIGH;  Surgeon: Ritika Viveros MD;  [facility];  Service: General    WOUND DEBRIDEMENT Left 2021    Procedure: DEBRIDEMENT WOUND AND DRESSING CHANGE [facility] OUT); Surgeon: Emile Melendez DO;  [facility];  Service: General    WOUND DEBRIDEMENT Left 2021    Procedure: DEBRIDEMENT LOWER EXTREMITY [facility] OUT), left groin and thigh;  Surgeon: Ritika Viveros MD;  [facility];  Service: General    WOUND DEBRIDEMENT Left 2021    Procedure: DEBRIDEMENT LOWER EXTREMITY ([facility] OUT); Surgeon:  Phil Dunn DO;  [facility];  Service: General    WOUND DEBRIDEMENT Left 2021    Procedure: Washout left thigh wound and closure;  Surgeon: Gilda Jung DO;  [facility];  Service: General     Social History   Social History     Substance and Sexual Activity   Alcohol Use Not Currently    Comment: rarely     Social History     Substance and Sexual Activity   Drug Use No     E-Cigarette/Vaping    E-Cigarette Use Never User      E-Cigarette/Vaping Substances    Nicotine No     THC No     CBD No     Flavoring No      Social History     Tobacco Use   Smoking Status Former Smoker    Packs/day: 1 00    Years: 20 00    Pack years: 20 00    Types: Cigarettes    Quit date: 2021    Years since quittin 0   Smokeless Tobacco Never Used     Family History: non-contributory    Meds/Allergies   all current active meds have been reviewed  Allergies   Allergen Reactions    Amoxicillin Hives     childhood       Objective   First Vitals:   Blood Pressure: 150/68 (22 1024)  Pulse: 82 (22 1024)  Temperature: 98 9 °F (37 2 °C) (22 1028)  Temp Source: Oral (22 0904)  Respirations: 18 (22 1028)  SpO2: 93 % (22 1024)    Current Vitals:   Blood Pressure: 148/80 (22 0730)  Pulse: 60 (22 0730)  Temperature: 97 5 °F (36 4 °C) (04/01/22 0730)  Temp Source: Oral (04/01/22 0730)  Respirations: 19 (04/01/22 0730)  SpO2: 92 % (04/01/22 0730)    No intake or output data in the 24 hours ending 04/01/22 1317    Invasive Devices  Report    Peripheral Intravenous Line            Peripheral IV 04/01/22 Right Hand <1 day                Physical Exam  Vitals reviewed  Constitutional:       General: He is awake  He is not in acute distress  Appearance: Normal appearance  He is well-developed  He is morbidly obese  He is not toxic-appearing or diaphoretic  Interventions: He is not intubated  HENT:      Head: Normocephalic and atraumatic  Not macrocephalic and not microcephalic  No raccoon eyes, Shaffer's sign, right periorbital erythema or left periorbital erythema  Right Ear: Hearing and external ear normal       Left Ear: Hearing and external ear normal       Nose: Nose normal    Eyes:      General: No scleral icterus  Right eye: No discharge  Left eye: No discharge  Conjunctiva/sclera: Conjunctivae normal       Right eye: Right conjunctiva is not injected  No hemorrhage  Left eye: Left conjunctiva is not injected  No hemorrhage  Pupils: Pupils are equal, round, and reactive to light  Cardiovascular:      Rate and Rhythm: Normal rate and regular rhythm  Pulmonary:      Effort: Pulmonary effort is normal  No tachypnea, bradypnea or respiratory distress  He is not intubated  Breath sounds: No stridor  Abdominal:      General: There is no distension  Palpations: Abdomen is soft  Abdomen is not rigid  Tenderness: There is no abdominal tenderness  There is no guarding or rebound  Hernia: No hernia is present  Musculoskeletal:      Left lower leg: Edema present  Skin:     General: Skin is warm and dry  Capillary Refill: Capillary refill takes less than 2 seconds  Coloration: Skin is not cyanotic, jaundiced or mottled        Findings: Erythema (lower abdomen/pannus) and lesion present  No abscess or rash  Neurological:      General: No focal deficit present  Mental Status: He is alert and oriented to person, place, and time  He is not disoriented  GCS: GCS eye subscore is 4  GCS verbal subscore is 5  GCS motor subscore is 6  Cranial Nerves: Cranial nerves are intact  No cranial nerve deficit  Psychiatric:         Speech: Speech normal          Behavior: Behavior normal  Behavior is cooperative  Lab Results:   I have personally reviewed pertinent lab results  , CBC:   Lab Results   Component Value Date    WBC 6 65 04/01/2022    HGB 9 1 (L) 04/01/2022    HCT 31 5 (L) 04/01/2022    MCV 76 (L) 04/01/2022     04/01/2022    MCH 22 0 (L) 04/01/2022    MCHC 28 9 (L) 04/01/2022    RDW 16 2 (H) 04/01/2022    MPV 9 2 04/01/2022   , CMP:   Lab Results   Component Value Date    SODIUM 134 (L) 04/01/2022    K 4 6 04/01/2022    CL 99 (L) 04/01/2022    CO2 22 04/01/2022    BUN 27 (H) 04/01/2022    CREATININE 1 13 04/01/2022    CALCIUM 8 2 (L) 04/01/2022    AST 22 04/01/2022    ALT 16 04/01/2022    ALKPHOS 125 (H) 04/01/2022    EGFR 80 04/01/2022   , Coagulation: No results found for: PT, INR, APTT  Imaging: I have personally reviewed pertinent reports  and I have personally reviewed pertinent films in PACS  EKG, Pathology, and Other Studies: I have personally reviewed pertinent reports  Counseling / Coordination of Care  Total floor / unit time spent today 50 minutes  Greater than 50% of total time was spent with the patient and / or family counseling and / or coordination of care  A description of the counseling / coordination of care: Obtaining patient history, performing physical exam, reviewing pertinent labs and imaging, discussed management with attending physician

## 2022-04-01 NOTE — PROGRESS NOTES
Vancomycin Assessment    Bee Barrios is a 37 y o  male who is currently receiving vancomycin 1500 mg IV q8h for MRSA suspected,skin-soft tissue infection     Relevant clinical data and objective history reviewed:  Creatinine   Date Value Ref Range Status   04/01/2022 1 13 0 60 - 1 30 mg/dL Final     Comment:     Standardized to IDMS reference method   03/31/2022 1 09 0 60 - 1 30 mg/dL Final     Comment:     Standardized to IDMS reference method   03/30/2022 0 96 0 60 - 1 30 mg/dL Final     Comment:     Standardized to IDMS reference method   12/29/2016 1 07 0 76 - 1 27 mg/dL Final     /80 (BP Location: Left arm)   Pulse 60   Temp 97 5 °F (36 4 °C) (Oral)   Resp 19   SpO2 92%   No intake/output data recorded  Lab Results   Component Value Date/Time    BUN 27 (H) 04/01/2022 05:11 AM    BUN 19 12/29/2016 11:35 AM    WBC 6 65 04/01/2022 05:11 AM    WBC 8 1 01/23/2017 10:55 AM    HGB 9 1 (L) 04/01/2022 05:11 AM    HGB 14 5 01/23/2017 10:55 AM    HCT 31 5 (L) 04/01/2022 05:11 AM    HCT 43 8 01/23/2017 10:55 AM    MCV 76 (L) 04/01/2022 05:11 AM    MCV 89 01/23/2017 10:55 AM     04/01/2022 05:11 AM     01/23/2017 10:55 AM     Temp Readings from Last 3 Encounters:   04/01/22 97 5 °F (36 4 °C) (Oral)   03/21/22 98 °F (36 7 °C)   03/14/22 98 6 °F (37 °C)     Vancomycin Days of Therapy: 2    Assessment/Plan  The patient is currently on vancomycin utilizing scheduled dosing  Baseline risks associated with therapy include: concomitant nephrotoxic medications  The patient is receiving 1500 mg IV q8h with the most recent vancomycin level being at steady-state and therapeutic(16 1) based on a goal of 15-20 (appropriate for most indications) ; therefore, after clinical evaluation will be continued   Pharmacy will continue to follow closely for s/sx of nephrotoxicity, infusion reactions, and appropriateness of therapy  BMP and CBC will be ordered per protocol    Plan for trough as patient at approximately 1800 on 4/7/22  Pharmacy will continue to follow the patients culture results and clinical progress daily      Fly Martin, Pharmacist

## 2022-04-01 NOTE — PLAN OF CARE
Problem: MOBILITY - ADULT  Goal: Maintains/Returns to pre admission functional level  Description: INTERVENTIONS:  - Perform BMAT or MOVE assessment daily    - Set and communicate daily mobility goal to care team and patient/family/caregiver     - Collaborate with rehabilitation services on mobility goals if consulted  - Stand patient 3 times a day  - Ambulate patient 3 times a day  - Out of bed to chair 3 times a day   - Out of bed for meals 3 times a day  - Out of bed for toileting  - Record patient progress and toleration of activity level   4/1/2022 1756 by Petra Delcid RN  Outcome: Progressing  4/1/2022 1755 by Petra Delcid RN  Outcome: Progressing     Problem: Prexisting or High Potential for Compromised Skin Integrity  Goal: Skin integrity is maintained or improved  Description: INTERVENTIONS:  - Identify patients at risk for skin breakdown  - Assess and monitor skin integrity  - Assess and monitor nutrition and hydration status  - Monitor labs   - Assess for incontinence   - Turn and reposition patient  - Assist with mobility/ambulation  - Relieve pressure over bony prominences  - Avoid friction and shearing  - Provide appropriate hygiene as needed including keeping skin clean and dry  - Evaluate need for skin moisturizer/barrier cream  - Collaborate with interdisciplinary team   - Patient/family teaching  - Consider wound care consult   4/1/2022 1756 by Petra Delcid RN  Outcome: Progressing  4/1/2022 1755 by Petra Delcid RN  Outcome: Progressing     Problem: Potential for Falls  Goal: Patient will remain free of falls  Description: INTERVENTIONS:  - Educate patient/family on patient safety including physical limitations  - Instruct patient to call for assistance with activity   - Consult OT/PT to assist with strengthening/mobility   - Keep Call bell within reach  - Keep bed low and locked with side rails adjusted as appropriate  - Keep care items and personal belongings within reach  - Initiate and maintain comfort rounds  - Make Fall Risk Sign visible to staff  - Offer Toileting every 2 Hours, in advance of need    4/1/2022 1756 by Tamia Cui RN  Outcome: Progressing  4/1/2022 1755 by Tamia Cui RN  Outcome: Progressing     Problem: Nutrition/Hydration-ADULT  Goal: Nutrient/Hydration intake appropriate for improving, restoring or maintaining nutritional needs  Description: Monitor and assess patient's nutrition/hydration status for malnutrition  Collaborate with interdisciplinary team and initiate plan and interventions as ordered  Monitor patient's weight and dietary intake as ordered or per policy  Utilize nutrition screening tool and intervene as necessary  Determine patient's food preferences and provide high-protein, high-caloric foods as appropriate       INTERVENTIONS:  - Monitor oral intake, urinary output, labs, and treatment plans  - Assess nutrition and hydration status and recommend course of action  - Evaluate amount of meals eaten  - Assist patient with eating if necessary   - Allow adequate time for meals  - Recommend/ encourage appropriate diets, oral nutritional supplements, and vitamin/mineral supplements  - Order, calculate, and assess calorie counts as needed  - Recommend, monitor, and adjust tube feedings and TPN/PPN based on assessed needs  - Assess need for intravenous fluids  - Provide specific nutrition/hydration education as appropriate  - Include patient/family/caregiver in decisions related to nutrition  4/1/2022 1756 by Tamia Cui RN  Outcome: Progressing     Problem: PAIN - ADULT  Goal: Verbalizes/displays adequate comfort level or baseline comfort level  Description: Interventions:  - Encourage patient to monitor pain and request assistance  - Assess pain using appropriate pain scale  - Administer analgesics based on type and severity of pain and evaluate response  - Implement non-pharmacological measures as appropriate and evaluate response  - Consider cultural and social influences on pain and pain management  - Notify physician/advanced practitioner if interventions unsuccessful or patient reports new pain  Outcome: Progressing     Problem: INFECTION - ADULT  Goal: Absence or prevention of progression during hospitalization  Description: INTERVENTIONS:  - Assess and monitor for signs and symptoms of infection  - Monitor lab/diagnostic results  - Monitor all insertion sites, i e  indwelling lines, tubes, and drains  - Monitor endotracheal if appropriate and nasal secretions for changes in amount and color  - [place] appropriate cooling/warming therapies per order  - Administer medications as ordered  - Instruct and encourage patient and family to use good hand hygiene technique  - Identify and instruct in appropriate isolation precautions for identified infection/condition  Outcome: Progressing     Problem: DISCHARGE PLANNING  Goal: Discharge to home or other facility with appropriate resources  Description: INTERVENTIONS:  - Identify barriers to discharge w/patient and caregiver  - Arrange for needed discharge resources and transportation as appropriate  - Identify discharge learning needs (meds, wound care, etc )  - Arrange for interpretive services to assist at discharge as needed  - Refer to Case Management Department for coordinating discharge planning if the patient needs post-hospital services based on physician/advanced practitioner order or complex needs related to functional status, cognitive ability, or social support system  Outcome: Progressing     Problem: Knowledge Deficit  Goal: Patient/family/caregiver demonstrates understanding of disease process, treatment plan, medications, and discharge instructions  Description: Complete learning assessment and assess knowledge base    Interventions:  - Provide teaching at level of understanding  - Provide teaching via preferred learning methods  Outcome: Progressing

## 2022-04-01 NOTE — UTILIZATION REVIEW
Continued Stay Review    Date: 4-1-22                          Current Patient Class:  Inpatient Current Level of Care:  Med surg    HPI:41 y o  male initially admitted on 3-30-22 for panniculitis and induration of lower left panus  Assessment/Plan:     General surgery consult completed  Diagnostic needle aspiration attempted without purulence expressed  Recommend wt loss and gluse control  Patient is undergoing workup for bariatric surgery  Day 3 of iv vancomycin  Continue iv ancef  Iv toradol scheduled q6 hr for pain management      Vital Signs:     Date/Time Temp Pulse Resp BP MAP (mmHg) SpO2 Nasal Cannula O2 Flow Rate (L/min) O2 Device   04/01/22 07:30:35 97 5 °F (36 4 °C) 60 19 148/80 103 92 % 2 L/min Nasal cannula   03/31/22 23:54:18 98 °F (36 7 °C) 57 18 150/80 103 90 % -- --   03/31/22 2100 -- -- -- -- -- 84 % Abnormal  2 L/min  Nasal cannula   03/31/22 20:34:21 98 1 °F (36 7 °C) 61 -- -- -- 91 % -- --   03/31/22 1943 -- -- -- -- -- -- -- Nasal cannula   03/31/22 17:50:09 -- 62 -- 140/72 95 93 % -- --   03/31/22 15:48:24 98 1 °F (36 7 °C) 60 14 144/73 97 89 % Abnormal  -- --   03/31/22 09:04:31 97 4 °F (36 3 °C)   Abnormal  65 20 152/74 100 92 % -- None (Room air)               Pertinent Labs/Diagnostic Results:   Results from last 7 days   Lab Units 03/30/22  1122   SARS-COV-2  Negative     Results from last 7 days   Lab Units 04/01/22  0511 03/31/22  0458 03/30/22  1141   WBC Thousand/uL 6 65 7 35 8 23   HEMOGLOBIN g/dL 9 1* 9 0* 9 8*   HEMATOCRIT % 31 5* 32 6* 35 1*   PLATELETS Thousands/uL 333 295 342   NEUTROS ABS Thousands/µL  --  4 96 6 55         Results from last 7 days   Lab Units 04/01/22  0511 03/31/22  0458 03/30/22  1141   SODIUM mmol/L 134* 137 138   POTASSIUM mmol/L 4 6 4 3 4 1   CHLORIDE mmol/L 99* 100 102   CO2 mmol/L 22 29 32   ANION GAP mmol/L 13 8 4   BUN mg/dL 27* 20 16   CREATININE mg/dL 1 13 1 09 0 96   EGFR ml/min/1 73sq m 80 84 98   CALCIUM mg/dL 8 2* 8 0* 8 1* MAGNESIUM mg/dL  --  1 9  --      Results from last 7 days   Lab Units 04/01/22  0511 03/31/22  0458 03/30/22  1141   AST U/L 22 19 25   ALT U/L 16 19 19   ALK PHOS U/L 125* 99 98   TOTAL PROTEIN g/dL 7 9 7 5 8 2   ALBUMIN g/dL 2 3* 2 2* 2 4*   TOTAL BILIRUBIN mg/dL 0 28 0 28 0 23     Results from last 7 days   Lab Units 04/01/22  1123 04/01/22  0710 03/31/22  2153 03/31/22  1656 03/31/22  1106 03/31/22  0716 03/30/22  2111 03/30/22  1755 03/30/22  1112   POC GLUCOSE mg/dl 160* 124 109 135 175* 121 100 103 121     Results from last 7 days   Lab Units 04/01/22  0511 03/31/22  0458 03/30/22  1141   GLUCOSE RANDOM mg/dL 123 115 126         Results from last 7 days   Lab Units 03/30/22  1141   HEMOGLOBIN A1C % 7 5*   EAG mg/dl 169       Results from last 7 days   Lab Units 03/30/22  1141   PROTIME seconds 13 2   INR  1 02   PTT seconds 29     Results from last 7 days   Lab Units 03/30/22  1141   TSH 3RD GENERATON uIU/mL 2 784         Results from last 7 days   Lab Units 03/30/22  1141   LACTIC ACID mmol/L 0 5       Results from last 7 days   Lab Units 04/01/22  0511   FERRITIN ng/mL 38       Results from last 7 days   Lab Units 03/30/22  1122   INFLUENZA A PCR  Negative   INFLUENZA B PCR  Negative   RSV PCR  Negative       Results from last 7 days   Lab Units 03/30/22  1700 03/30/22  1141   BLOOD CULTURE   --  No Growth at 48 hrs  No Growth at 48 hrs     GRAM STAIN RESULT  1+ Gram positive cocci in pairs*  1+ Polys*  --    WOUND CULTURE  3+ Growth of Beta Hemolytic Streptococcus Group C*  Few Colonies of Staphylococcus aureus*  Few Colonies of Beta Hemolytic Streptococcus Group G*  --          Scheduled Medications:    amLODIPine, 10 mg, Oral, Daily  aspirin, 81 mg, Oral, Daily  atorvastatin, 80 mg, Oral, Daily With Dinner  carvedilol, 6 25 mg, Oral, BID With Meals  cefazolin, 2,000 mg, Intravenous, Q8H  enoxaparin, 60 mg, Subcutaneous, Daily  insulin lispro, 2-12 Units, Subcutaneous, 4x Daily (AC & HS)  ketorolac, 30 mg, Intravenous, Q6H SHAZIA  lisinopril, 10 mg, Oral, Daily  nystatin, , Topical, TID  vancomycin, 15 mg/kg (Adjusted), Intravenous, Q8H      Continuous IV Infusions:     PRN Meds:  acetaminophen, 650 mg, Oral, Q6H PRN  albuterol, 2 puff, Inhalation, Q6H PRN  HYDROmorphone, 2 mg, Intravenous, Q4H PRN        Discharge Plan: to be detemrined     Network Utilization Review Department  ATTENTION: Please call with any questions or concerns to [phone] and carefully listen to the prompts so that you are directed to the right person  All voicemails are confidential   Chrystine Can all requests for admission clinical reviews, approved or denied determinations and any other requests to dedicated fax number below belonging to the campus where the patient is receiving treatment   List of dedicated fax numbers for the Facilities:  [address] DENIALS (Administrative/Medical Necessity) [phone]   [address] (Maternity/NICU/Pediatrics) [phone]   [address] [place] [address]  [address] [facility] [place] [address] [address] [place] [address] [address] [facility] [place] [address] [phone]

## 2022-04-01 NOTE — DISCHARGE INSTR - OTHER ORDERS
Skin Care Plan: 1  Cleanse wounds to left anterior and left medial thigh with NSS & pat dry  Apply Triad paste to open wounds left thigh then cover with Allevyn bordered foam dressings  Change every other day & as needed for soilage/dislodement  2 Cleanse wound to right medial thigh and sacrum  Cover both areas with Allevyn bordered foam dressings & change every other day & as needed for soilage/dislodgement  3 Cleanse skin folds between bilateral inguinal and abdominal areas & pat dry  Apply Interdry to right inguinal and right abdominal folds  Change every 3 days & prn soilage/dislodgement  Do not discard Interdry - wash with soap & water and hang to dry  Do not mix with Antifungal powder or any type of lotion as this would inactivate the silver impregnated in the dressing  4  Apply hydraguard to both heels 2x/day and as needed for prevention and protection  5  Apply skin nourishing cream to the entire skin daily for moisture  6  Apply pressure redistribution cushion to chair when out of bed to chair    7  Follow up at [facility]  [phone]

## 2022-04-01 NOTE — WOUND OSTOMY CARE
Progress Note - Wound   Relda Curl 37 y o  male MRN: [id number]  Unit/Bed#: 49095 [facility] 404-01 Encounter: [id number]      Assessment: This is a 37year old male patient admitted with cellulitis to multiple sites of trunk with open wounds  He has a history of NIDDM, morbid obesity, CAD, HTN, hydradenitis suppurativa and NSTEMI  He was awake, alert & oriented and states the he is seen at outpatient [address] @ [address] [place] [address]  He is independent with most ADL's and can reposition himself in bed  See wound assessment noted below:        Wound 03/07/22 Surgical Leg Left;Upper;Medial (Active)   Wound Image   04/01/22 1516   Wound Description Pink granulation; Yellow slough 04/01/22 1516   Sarika-wound Assessment Scar tissue 04/01/22 1516   Wound Length (cm) 8 cm 04/01/22 1516   Wound Width (cm) 14 cm 04/01/22 1516   Wound Depth (cm) 0 1 cm 04/01/22 1516   Wound Surface Area (cm^2) 112 cm^2 04/01/22 1516   Wound Volume (cm^3) 11 2 cm^3 04/01/22 1516   Calculated Wound Volume (cm^3) 11 2 cm^3 04/01/22 1516   Change in Wound Size % -90 48 04/01/22 1516   Drainage Amount Moderate 04/01/22 1516   Drainage Description Serous; Yellow 04/01/22 1516   Dressing Triad paste; Allevyn foam drsg 04/01/22 1516   Dressing Status Clean;Dry; Intact 04/01/22 1516       Wound 03/30/22 Sacrum   Wound Image   04/01/22 1516   Wound Description Pink granulation 04/01/22 1516   Sarika-wound Assessment Blanchable erythema; hyperpigmentation 04/01/22 1516   Wound Length (cm) 0 2 cm 04/01/22 1516   Wound Width (cm) 0 5 cm 04/01/22 1504   Wound Depth (cm) 0 1 cm 04/01/22 1504   Wound Surface Area (cm^2) 0 1 cm^2 04/01/22 1504   Wound Volume (cm^3) 0 01 cm^3 04/01/22 1504   Calculated Wound Volume (cm^3) 0 01 cm^3 04/01/22 1504   Drainage Amount None 04/01/22 1504   Treatments Cleansed 04/01/22 1504   Dressing Allevyn bordered foam (small per pt request) 04/01/22 1504       Wound 03/30/22 Surgical Thigh Anterior; Left (Active)   Wound Image 03/30/22 1614   Wound Description Pink granulation tissue; Yellow slough 04/01/22 1600   Wound Length (cm) 10 cm 04/0122 1600   Wound Width (cm) 4 cm 04/01/22 1600   Wound Surface Area (cm^2) 40 cm^2 04/01/22 1600   Drainage Amount Moderate 04/01/22 1600   Drainage Description Serous; Yellow 04/01/22 1600   Treatments Cleansed 04/01/22 1600   Dressing Triad paste; Allevyn foam 04/01/22 1600   Dressing Changed New 04/01/22 1600   Dressing Status Clean;Dry; Intact 04/01/22 1600       Wound 03/30/22 Abdomen Left (Active)   Wound Image   04/01/22 1522   Wound Description Pink granulation 04/01/22 1522   Wound Length (cm) 0 5 cm 04/01/22 1522   Wound Width (cm) 0 7 cm 04/01/22 1522   Wound Depth (cm) 0 1 cm 04/01/22 1522   Wound Surface Area (cm^2) 0 35 cm^2 04/01/22 1522   Wound Volume (cm^3) 0 035 cm^3 04/01/22 1522   Calculated Wound Volume (cm^3) 0 04 cm^3 04/01/22 1522   Drainage Amount Moderate 04/01/22 1522   Drainage Description Serous; Yellow 04/01/22 1522   Treatments Cleansed 04/01/22 1522   Dressing Allevyn foam 04/01/22 1522   Dressing Changed New 03/31/22 1522   Dressing Status Clean;Dry;  Intact 04/01/22 1522       Wound 03/30/22 Thigh Anterior;Right (Active)   Wound Image   04/01/22 1537   Wound Description Pink granulation 04/01/22 1537   Sarika-wound Assessment Erythema 04/01/22 1537   Wound Length (cm) 2 cm 04/01/22 1537   Wound Width (cm) 0 3 cm 04/01/22 1537   Wound Depth (cm) 0 1 cm 04/01/22 1537   Wound Surface Area (cm^2) 0 6 cm^2 04/01/22 1537   Wound Volume (cm^3) 0 06 cm^3 04/01/22 1537   Calculated Wound Volume (cm^3) 0 06 cm^3 04/01/22 1537   Change in Wound Size % 99 76 04/01/22 1537   Drainage Amount Moderate 04/01/22 1537   Drainage Description Clear 04/01/22 1537   Dressing Site cleansed, dried & Interdry placed 04/01/22 1537       Wound 03/30/22 Abscess Cellulitis Thigh Anterior;Left;Proximal (Active)   Wound Image   03/30/22 1618   Wound Description Pink granulation; Yellow slough 04/01/22 1537   Drainage Amount Moderate 04/01/22 1537   Drainage Description Serous; Yellow 04/01/22 1537   Treatments Cleansed 04/01/22 1537   Dressing Triad paste; Allevyn foam drsg 04/01/22 1537       Plan: 1  Cleanse wounds to left anterior and left medial thigh with NSS & pat dry  Apply Triad paste to open wounds left thigh then cover with Allevyn bordered foam dressings  Change every other day & prn soilage/dislodement  2 Cleanse wound to right medial thigh and sacrum  Cover both areas with Allevyn bordered foam dressings & change every other day & prn soilage/dislodgement  3 Cleanse skin folds between bilateral inguinal and abdominal areas & pat dry  Apply Interdry to right inguinal and right abdominal folds  Change every 3 days & prn soilage/dislodgement  Do not discard Interdry - wash with soap & water and hang to dry  Do not mix with Antifungal powder or any type of lotion as this would inactivate the silver impregnated in the dressing  4  Apply hydraguard to b/l heels BID and PRN for prevention and protection  5  Apply skin nourishing cream to the entire skin daily for moisture  6  Encourage patient to turn and reposition himself every 2 hours   7  Elevate heels off of bed with pillows to offload pressure   8  Apply EHOB waffle cushion to chair when OOB, if able    Discussed assessment findings, and plan of care/recommendations with Nuha Leyva RN    Wound care will follow along with patient weekly, please call or tiger text with questions and concerns    Recommendations written as orders    Amelia Gillette RN, BSN, [facility]

## 2022-04-02 LAB
ALBUMIN SERPL BCP-MCNC: 2.3 G/DL (ref 3.5–5)
ALP SERPL-CCNC: 146 U/L (ref 46–116)
ALT SERPL W P-5'-P-CCNC: 15 U/L (ref 12–78)
ANION GAP SERPL CALCULATED.3IONS-SCNC: 6 MMOL/L (ref 4–13)
AST SERPL W P-5'-P-CCNC: 26 U/L (ref 5–45)
BACTERIA WND AEROBE CULT: ABNORMAL
BASOPHILS # BLD AUTO: 0.05 THOUSANDS/ΜL (ref 0–0.1)
BASOPHILS NFR BLD AUTO: 1 % (ref 0–1)
BILIRUB SERPL-MCNC: 0.35 MG/DL (ref 0.2–1)
BUN SERPL-MCNC: 31 MG/DL (ref 5–25)
CALCIUM ALBUM COR SERPL-MCNC: 9.8 MG/DL (ref 8.3–10.1)
CALCIUM SERPL-MCNC: 8.4 MG/DL (ref 8.3–10.1)
CHLORIDE SERPL-SCNC: 100 MMOL/L (ref 100–108)
CO2 SERPL-SCNC: 25 MMOL/L (ref 21–32)
CREAT SERPL-MCNC: 1.22 MG/DL (ref 0.6–1.3)
EOSINOPHIL # BLD AUTO: 0.25 THOUSAND/ΜL (ref 0–0.61)
EOSINOPHIL NFR BLD AUTO: 4 % (ref 0–6)
ERYTHROCYTE [DISTWIDTH] IN BLOOD BY AUTOMATED COUNT: 16 % (ref 11.6–15.1)
GFR SERPL CREATININE-BSD FRML MDRD: 73 ML/MIN/1.73SQ M
GLUCOSE SERPL-MCNC: 113 MG/DL (ref 65–140)
GLUCOSE SERPL-MCNC: 120 MG/DL (ref 65–140)
GLUCOSE SERPL-MCNC: 137 MG/DL (ref 65–140)
GLUCOSE SERPL-MCNC: 141 MG/DL (ref 65–140)
GLUCOSE SERPL-MCNC: 153 MG/DL (ref 65–140)
GLUCOSE SERPL-MCNC: 93 MG/DL (ref 65–140)
GRAM STN SPEC: ABNORMAL
GRAM STN SPEC: ABNORMAL
HCT VFR BLD AUTO: 34.1 % (ref 36.5–49.3)
HGB BLD-MCNC: 9.3 G/DL (ref 12–17)
IMM GRANULOCYTES # BLD AUTO: 0.07 THOUSAND/UL (ref 0–0.2)
IMM GRANULOCYTES NFR BLD AUTO: 1 % (ref 0–2)
LYMPHOCYTES # BLD AUTO: 1.13 THOUSANDS/ΜL (ref 0.6–4.47)
LYMPHOCYTES NFR BLD AUTO: 16 % (ref 14–44)
MCH RBC QN AUTO: 22 PG (ref 26.8–34.3)
MCHC RBC AUTO-ENTMCNC: 27.3 G/DL (ref 31.4–37.4)
MCV RBC AUTO: 81 FL (ref 82–98)
MONOCYTES # BLD AUTO: 0.7 THOUSAND/ΜL (ref 0.17–1.22)
MONOCYTES NFR BLD AUTO: 10 % (ref 4–12)
NEUTROPHILS # BLD AUTO: 4.86 THOUSANDS/ΜL (ref 1.85–7.62)
NEUTS SEG NFR BLD AUTO: 68 % (ref 43–75)
NRBC BLD AUTO-RTO: 0 /100 WBCS
PLATELET # BLD AUTO: 355 THOUSANDS/UL (ref 149–390)
PMV BLD AUTO: 9.3 FL (ref 8.9–12.7)
POTASSIUM SERPL-SCNC: 5 MMOL/L (ref 3.5–5.3)
PROT SERPL-MCNC: 7.9 G/DL (ref 6.4–8.2)
RBC # BLD AUTO: 4.23 MILLION/UL (ref 3.88–5.62)
SODIUM SERPL-SCNC: 131 MMOL/L (ref 136–145)
WBC # BLD AUTO: 7.06 THOUSAND/UL (ref 4.31–10.16)

## 2022-04-02 PROCEDURE — 99232 SBSQ HOSP IP/OBS MODERATE 35: CPT | Performed by: FAMILY MEDICINE

## 2022-04-02 PROCEDURE — 94762 N-INVAS EAR/PLS OXIMTRY CONT: CPT

## 2022-04-02 PROCEDURE — 85025 COMPLETE CBC W/AUTO DIFF WBC: CPT

## 2022-04-02 PROCEDURE — 82948 REAGENT STRIP/BLOOD GLUCOSE: CPT

## 2022-04-02 PROCEDURE — 80053 COMPREHEN METABOLIC PANEL: CPT

## 2022-04-02 RX ORDER — HYDROMORPHONE HCL/PF 1 MG/ML
1 SYRINGE (ML) INJECTION EVERY 4 HOURS PRN
Status: DISCONTINUED | OUTPATIENT
Start: 2022-04-02 | End: 2022-04-02

## 2022-04-02 RX ORDER — HYDROMORPHONE HCL/PF 1 MG/ML
1 SYRINGE (ML) INJECTION ONCE
Status: COMPLETED | OUTPATIENT
Start: 2022-04-02 | End: 2022-04-02

## 2022-04-02 RX ORDER — HYDROMORPHONE HCL 110MG/55ML
2 PATIENT CONTROLLED ANALGESIA SYRINGE INTRAVENOUS EVERY 6 HOURS PRN
Status: DISCONTINUED | OUTPATIENT
Start: 2022-04-02 | End: 2022-04-02

## 2022-04-02 RX ORDER — HYDROMORPHONE HCL 110MG/55ML
2 PATIENT CONTROLLED ANALGESIA SYRINGE INTRAVENOUS EVERY 4 HOURS PRN
Status: DISCONTINUED | OUTPATIENT
Start: 2022-04-02 | End: 2022-04-03

## 2022-04-02 RX ORDER — CLINDAMYCIN PHOSPHATE 300 MG/50ML
300 INJECTION INTRAVENOUS EVERY 8 HOURS
Status: DISCONTINUED | OUTPATIENT
Start: 2022-04-02 | End: 2022-04-03

## 2022-04-02 RX ADMIN — HYDROMORPHONE HYDROCHLORIDE 2 MG: 2 INJECTION, SOLUTION INTRAMUSCULAR; INTRAVENOUS; SUBCUTANEOUS at 09:00

## 2022-04-02 RX ADMIN — NYSTATIN: 100000 POWDER TOPICAL at 08:13

## 2022-04-02 RX ADMIN — ATORVASTATIN CALCIUM 80 MG: 80 TABLET, FILM COATED ORAL at 17:21

## 2022-04-02 RX ADMIN — ENOXAPARIN SODIUM 60 MG: 60 INJECTION SUBCUTANEOUS at 08:13

## 2022-04-02 RX ADMIN — KETOROLAC TROMETHAMINE 30 MG: 30 INJECTION, SOLUTION INTRAMUSCULAR at 18:09

## 2022-04-02 RX ADMIN — HYDROMORPHONE HYDROCHLORIDE 2 MG: 2 INJECTION, SOLUTION INTRAMUSCULAR; INTRAVENOUS; SUBCUTANEOUS at 17:20

## 2022-04-02 RX ADMIN — CARVEDILOL 6.25 MG: 6.25 TABLET, FILM COATED ORAL at 08:12

## 2022-04-02 RX ADMIN — HYDROMORPHONE HYDROCHLORIDE 2 MG: 2 INJECTION, SOLUTION INTRAMUSCULAR; INTRAVENOUS; SUBCUTANEOUS at 21:53

## 2022-04-02 RX ADMIN — HYDROMORPHONE HYDROCHLORIDE 2 MG: 2 INJECTION, SOLUTION INTRAMUSCULAR; INTRAVENOUS; SUBCUTANEOUS at 01:35

## 2022-04-02 RX ADMIN — CLINDAMYCIN IN 5 PERCENT DEXTROSE 300 MG: 6 INJECTION, SOLUTION INTRAVENOUS at 18:16

## 2022-04-02 RX ADMIN — CLINDAMYCIN IN 5 PERCENT DEXTROSE 300 MG: 6 INJECTION, SOLUTION INTRAVENOUS at 10:52

## 2022-04-02 RX ADMIN — KETOROLAC TROMETHAMINE 30 MG: 30 INJECTION, SOLUTION INTRAMUSCULAR at 12:06

## 2022-04-02 RX ADMIN — HYDROMORPHONE HYDROCHLORIDE 1 MG: 1 INJECTION, SOLUTION INTRAMUSCULAR; INTRAVENOUS; SUBCUTANEOUS at 13:16

## 2022-04-02 RX ADMIN — CEFAZOLIN SODIUM 2000 MG: 2 SOLUTION INTRAVENOUS at 05:14

## 2022-04-02 RX ADMIN — KETOROLAC TROMETHAMINE 30 MG: 30 INJECTION, SOLUTION INTRAMUSCULAR at 00:14

## 2022-04-02 RX ADMIN — INSULIN LISPRO 2 UNITS: 100 INJECTION, SOLUTION INTRAVENOUS; SUBCUTANEOUS at 11:24

## 2022-04-02 RX ADMIN — HYDROMORPHONE HYDROCHLORIDE 1 MG: 1 INJECTION, SOLUTION INTRAMUSCULAR; INTRAVENOUS; SUBCUTANEOUS at 14:22

## 2022-04-02 RX ADMIN — KETOROLAC TROMETHAMINE 30 MG: 30 INJECTION, SOLUTION INTRAMUSCULAR at 23:20

## 2022-04-02 RX ADMIN — AMLODIPINE BESYLATE 10 MG: 10 TABLET ORAL at 08:13

## 2022-04-02 RX ADMIN — IRON SUCROSE 300 MG: 20 INJECTION, SOLUTION INTRAVENOUS at 11:24

## 2022-04-02 RX ADMIN — LISINOPRIL 10 MG: 10 TABLET ORAL at 08:12

## 2022-04-02 RX ADMIN — KETOROLAC TROMETHAMINE 30 MG: 30 INJECTION, SOLUTION INTRAMUSCULAR at 05:13

## 2022-04-02 RX ADMIN — ASPIRIN 81 MG: 81 TABLET, COATED ORAL at 08:12

## 2022-04-02 RX ADMIN — CARVEDILOL 6.25 MG: 6.25 TABLET, FILM COATED ORAL at 17:21

## 2022-04-02 RX ADMIN — Medication 1500 MG: at 01:34

## 2022-04-02 NOTE — CONSULTS
The patient's vancomycin therapy has been discontinued  Pharmacy will sign off now, thank you for this consult  Jeff WrightD

## 2022-04-02 NOTE — RESPIRATORY THERAPY NOTE
Overnight pulse oximetry was completed  According to the patients chart  He had an overnight study done here on his last admission 12/4/21

## 2022-04-02 NOTE — PROGRESS NOTES
[facility] Progress Note - Jethro Corbett 37 y o  male MRN: [id number]    Unit/Bed#: [address] [place] [address] Encounter: [id number]      Assessment/Plan:  * Cellulitis of multiple sites of trunk  Assessment & Plan  Patient with a history of multiple sites of cellulitis with last admission in 12/2021 presented with cellulitis of abdomen and pannus  Patient also has wound with some drainage  On previous admission, patient was started on Ancef 2 g q 8 and discharged on Keflex  Wound cultures grew beta-hemolytic strep,Staphylococcus haemolyticus which was susceptible to vancomycin    ED course:  Vancomycin    CT abdomen pelvis 3/30: No discrete intra-abdominal or pelvic abscess  Skin thickening and inflammatory stranding in the lower anterior abdominal wall pannus suggestive of cellulitis  Wound Culture 3/30: 1+ Gram positive cocci in pairs, 1+ Polys    Wound Care consulted   Nystatin powder b i d  For atrial title and abdominal pannus and groin area  Continue vancomycin and start Ancef 2 g  Continue to monitor expansion of cellulitis    Hidradenitis suppurativa  Assessment & Plan  Patient has history of hidranitis suppurativa with wounds currently present on left groin and abdominal pannus     Wound care consulted  Wound cultures pending   Daily dressings   Continue antibiotics as noted above  Swelling of left lower extremity  Assessment & Plan  Patient with a history of hidradenitis suprativa bilateral groin and thigh  Patient previously had cyst removed from his left thigh which required anesthesia  Patient has been following up with wound care weekly  Per patient, he is now to see them every 3 weeks  Patient's left lower extremity circumference is significantly increased compared to right    States that this is chronic problem and actually looks better than usual  Pt has history of lymphedema on left LE    Doppler ultrasound of left lower extremity to rule out DVT  Lovenox BID  Continue to monitor    Nocturnal hypoxia  Assessment & Plan  Oxygen saturation on admission 99% on room air  In 12/2021, patient had overnight O2 evaluation which showed significant hypoxemia with lowest desaturation of 59%  At that time, patient was advised to follow-up with pulmonology for complete sleep study  Will recommend pulmonology follow-up prior to discharge  Titrate oxygen as needed at night    - ambulatory O2 eval pending    Type 1 non-ST elevation myocardial infarction (NSTEMI) [facility])  Assessment & Plan  Patient has a history of MI with stent placement in 2001  Patient follows with Dr Amando Hartley outpatient    Patient currently on Coreg 6 25 mg b i d  Type 2 diabetes mellitus with diabetic polyneuropathy, without long-term current use of insulin [facility])  Assessment & Plan  Lab Results   Component Value Date    HGBA1C 7 5 (H) 03/30/2022       Recent Labs     03/31/22  0716 03/31/22  1106 03/31/22  1656 03/31/22  2153   POCGLU 121 175* 135 109       Blood Sugar Average: Last 72 hrs:  (P) 123 [id number]     Last A1c 7 0 in 11/2021   Repeat A1c 7 5    Held home medications at this time   Will Keep monitoring glucose   ISS  And monitor for basal and meal time insuline requirement While hospitalized     Essential hypertension  Assessment & Plan  BP on admission 180s/90s    Continue home medications:  Amlodipine 10mg, carvedilol 6 25mg and lisinopril 10mg   Monitor BP     Coronary artery disease  Assessment & Plan  Patient with a history of cardiac catheterization in 2020 and 2010  Follows with cardiology outpatient  Denies any chest pain and shortness of breath on admission  Patient currently takes Coreg 6 25 mg b i d , ASA 81 mg daily  Patient reports not taking Brilinta    Repeat EKG  Continue home medications          Subjective:   Patient is sleeping comfortably in bed on 4L of oxygen  He continues to complain of diffuse lower abdominal and pannus pain    He also reports some chills overnight although he has remained afebrile  He denies any headache, dizziness, shortness of breath, chest pain, nausea, vomiting, diarrhea, or dysuria  Objective:     Vitals: Blood pressure 125/70, pulse 67, temperature 98 3 °F (36 8 °C), temperature source Oral, resp  rate 18, SpO2 90 %  ,There is no height or weight on file to calculate BMI  Wt Readings from Last 3 Encounters:   03/07/22 (!) 172 kg (380 lb)   12/02/21 (!) 177 kg (390 lb 10 5 oz)   11/15/21 (!) 176 kg (388 lb)       Intake/Output Summary (Last 24 hours) at 4/2/2022 0824  Last data filed at 4/1/2022 0842  Gross per 24 hour   Intake --   Output 1 ml   Net -1 ml       Physical Exam:   Physical Exam  Constitutional:       General: He is not in acute distress  Appearance: He is obese  HENT:      Mouth/Throat:      Mouth: Mucous membranes are moist    Cardiovascular:      Rate and Rhythm: Normal rate and regular rhythm  Pulmonary:      Effort: Pulmonary effort is normal       Breath sounds: Normal breath sounds  Abdominal:      Tenderness: There is abdominal tenderness (On lower abdomen over cellulitic area)  Skin:     Capillary Refill: Capillary refill takes less than 2 seconds  Findings: Erythema and rash ( warm erythematous cellulitic area along the lower abdomen, which appears unchanged from yesterday ) present  Neurological:      Mental Status: He is alert and oriented to person, place, and time             Recent Results (from the past 24 hour(s))   Fingerstick Glucose (POCT)    Collection Time: 04/01/22 11:23 AM   Result Value Ref Range    POC Glucose 160 (H) 65 - 140 mg/dl   Fingerstick Glucose (POCT)    Collection Time: 04/01/22  4:01 PM   Result Value Ref Range    POC Glucose 123 65 - 140 mg/dl   Fingerstick Glucose (POCT)    Collection Time: 04/01/22  8:23 PM   Result Value Ref Range    POC Glucose 122 65 - 140 mg/dl   CBC and differential    Collection Time: 04/02/22  5:42 AM   Result Value Ref Range    WBC 7 06 4 31 - 10 16 Thousand/uL    RBC 4  23 3 88 - 5 62 Million/uL    Hemoglobin 9 3 (L) 12 0 - 17 0 g/dL    Hematocrit 34 1 (L) 36 5 - 49 3 %    MCV 81 (L) 82 - 98 fL    MCH 22 0 (L) 26 8 - 34 3 pg    MCHC 27 3 (L) 31 4 - 37 4 g/dL    RDW 16 0 (H) 11 6 - 15 1 %    MPV 9 3 8 9 - 12 7 fL    Platelets 149 230 - 739 Thousands/uL    nRBC 0 /100 WBCs    Neutrophils Relative 68 43 - 75 %    Immat GRANS % 1 0 - 2 %    Lymphocytes Relative 16 14 - 44 %    Monocytes Relative 10 4 - 12 %    Eosinophils Relative 4 0 - 6 %    Basophils Relative 1 0 - 1 %    Neutrophils Absolute 4 86 1 85 - 7 62 Thousands/µL    Immature Grans Absolute 0 07 0 00 - 0 20 Thousand/uL    Lymphocytes Absolute 1 13 0 60 - 4 47 Thousands/µL    Monocytes Absolute 0 70 0 17 - 1 22 Thousand/µL    Eosinophils Absolute 0 25 0 00 - 0 61 Thousand/µL    Basophils Absolute 0 05 0 00 - 0 10 Thousands/µL   Comprehensive metabolic panel    Collection Time: 04/02/22  5:42 AM   Result Value Ref Range    Sodium 131 (L) 136 - 145 mmol/L    Potassium 5 0 3 5 - 5 3 mmol/L    Chloride 100 100 - 108 mmol/L    CO2 25 21 - 32 mmol/L    ANION GAP 6 4 - 13 mmol/L    BUN 31 (H) 5 - 25 mg/dL    Creatinine 1 22 0 60 - 1 30 mg/dL    Glucose 113 65 - 140 mg/dL    Calcium 8 4 8 3 - 10 1 mg/dL    Corrected Calcium 9 8 8 3 - 10 1 mg/dL    AST 26 5 - 45 U/L    ALT 15 12 - 78 U/L    Alkaline Phosphatase 146 (H) 46 - 116 U/L    Total Protein 7 9 6 4 - 8 2 g/dL    Albumin 2 3 (L) 3 5 - 5 0 g/dL    Total Bilirubin 0 35 0 20 - 1 00 mg/dL    eGFR 73 ml/min/1 73sq m   Fingerstick Glucose (POCT)    Collection Time: 04/02/22  7:27 AM   Result Value Ref Range    POC Glucose 137 65 - 140 mg/dl       Current Facility-Administered Medications   Medication Dose Route Frequency Provider Last Rate Last Admin    acetaminophen (TYLENOL) tablet 650 mg  650 mg Oral Q6H PRN Jacquelyn Temple MD        albuterol (PROVENTIL HFA,VENTOLIN HFA) inhaler 2 puff  2 puff Inhalation Q6H PRN Yana Moore,         amLODIPine (NORVASC) tablet 10 mg  10 mg Oral Daily Sunitha Romero DO   10 mg at 04/02/22 0813    aspirin (ECOTRIN LOW STRENGTH) EC tablet 81 mg  81 mg Oral Daily Sunitha Romero DO   81 mg at 04/02/22 1474    atorvastatin (LIPITOR) tablet 80 mg  80 mg Oral Daily With Jeremie, DO   80 mg at 04/01/22 1706    carvedilol (COREG) tablet 6 25 mg  6 25 mg Oral BID With Meals Sunitha Romero DO   6 25 mg at 04/02/22 6220    ceFAZolin (ANCEF) IVPB (premix in dextrose) 2,000 mg 50 mL  2,000 mg Intravenous Q8H Cata Denny  mL/hr at 04/02/22 0514 2,000 mg at 04/02/22 0514    enoxaparin (LOVENOX) subcutaneous injection 60 mg  60 mg Subcutaneous Daily Bob Mendez MD   60 mg at 04/02/22 0813    HYDROmorphone (DILAUDID) injection 2 mg  2 mg Intravenous Q4H PRN Bob Mendez MD   2 mg at 04/02/22 0135    insulin lispro (HumaLOG) 100 units/mL subcutaneous injection 2-12 Units  2-12 Units Subcutaneous 4x Daily (AC & HS) Sunitha Romero DO   2 Units at 04/01/22 1253    ketorolac (TORADOL) injection 30 mg  30 mg Intravenous Q6H [address] Cata Denny DO   30 mg at 04/02/22 0513    lisinopril (ZESTRIL) tablet 10 mg  10 mg Oral Daily Sunitha Romero DO   10 mg at 04/02/22 0934    nystatin (MYCOSTATIN) powder   Topical TID Sunitha Romero DO   Given at 04/02/22 0813    vancomycin (VANCOCIN) 1500 mg in sodium chloride 0 9% 250 mL IVPB  15 mg/kg (Adjusted) Intravenous Q8H Lou Wright  7 mL/hr at 04/01/22 1025 1,500 mg at 04/02/22 0134       Invasive Devices  Report    Peripheral Intravenous Line            Peripheral IV 04/02/22 Left Hand <1 day                Lab, Imaging and other studies: I have personally reviewed pertinent reports      VTE Pharmacologic Prophylaxis: Enoxaparin (Lovenox)  VTE Mechanical Prophylaxis: reason for no mechanical VTE prophylaxis Lymphedema    Bob Mendez MD

## 2022-04-02 NOTE — UTILIZATION REVIEW
Continued Stay Review    Date: 4-2-22                         Current Patient Class: inpatient  Current Level of Care: med surg     HPI:41 y o  male initially admitted on 3-30-22 for cellulitis     Assessment/Plan:     Patient reports diffuse lower abdominal and pannus pain  He also reports chills overnight  Wound culture shows gram positive cocci in pairs and polys  Continue nystatin powder bid  Continue vancomycin and start iv ancef  Continue iv venofer and iv toradol q6 hr scheduled  Patient received iv dilaudid at [phone], [id number]  Patient requires 4L O2nc to maintain sats at least 86 %  Vital Signs:     Date/Time Temp Pulse Resp BP MAP (mmHg) SpO2 Nasal Cannula O2 Flow Rate (L/min) O2 Device   04/02/22 0730 -- -- -- -- -- -- 4 L/min Nasal cannula   04/02/22 07:29:04 98 3 °F (36 8 °C) 67 18 125/70 88 90 % 4 L/min Nasal cannula   04/02/22 0300 -- 52 Abnormal  -- -- -- 88 % Abnormal  -- --   04/02/22 0204 -- 50 Abnormal  -- -- -- 87 % Abnormal  -- --   04/02/22 0100 -- 56 -- -- -- 92 % -- --   04/02/22 00:18:20 97 9 °F (36 6 °C) 61 18 133/72 92 86 % Abnormal  -- --             Pertinent Labs/Diagnostic Results:         Results from last 7 days   Lab Units 03/30/22  1122   SARS-COV-2  Negative     Results from last 7 days   Lab Units 04/02/22  0542 04/01/22  0511 03/31/22  0458 03/30/22  1141 03/30/22  1141   WBC Thousand/uL 7 06 6 65 7 35   < > 8 23   HEMOGLOBIN g/dL 9 3* 9 1* 9 0*  --  9 8*   HEMATOCRIT % 34 1* 31 5* 32 6*  --  35 1*   PLATELETS Thousands/uL 355 333 295   < > 342   NEUTROS ABS Thousands/µL 4 86  --  4 96  --  6 55    < > = values in this interval not displayed           Results from last 7 days   Lab Units 04/02/22  0542 04/01/22  0511 03/31/22  0458 03/30/22  1141   SODIUM mmol/L 131* 134* 137 138   POTASSIUM mmol/L 5 0 4 6 4 3 4 1   CHLORIDE mmol/L 100 99* 100 102   CO2 mmol/L 25 22 29 32   ANION GAP mmol/L 6 13 8 4   BUN mg/dL 31* 27* 20 16   CREATININE mg/dL 1 22 1 13 1 09 0 96 EGFR ml/min/1 73sq m 73 80 84 98   CALCIUM mg/dL 8 4 8 2* 8 0* 8 1*   MAGNESIUM mg/dL  --   --  1 9  --      Results from last 7 days   Lab Units 04/02/22  0542 04/01/22  0511 03/31/22  0458 03/30/22  1141   AST U/L 26 22 19 25   ALT U/L 15 16 19 19   ALK PHOS U/L 146* 125* 99 98   TOTAL PROTEIN g/dL 7 9 7 9 7 5 8 2   ALBUMIN g/dL 2 3* 2 3* 2 2* 2 4*   TOTAL BILIRUBIN mg/dL 0 35 0 28 0 28 0 23     Results from last 7 days   Lab Units 04/02/22  1117 04/02/22  0727 04/01/22  2023 04/01/22  1601 04/01/22  1123 04/01/22  0710 03/31/22  2153 03/31/22  1656 03/31/22  1106 03/31/22  0716 03/30/22  2111 03/30/22  1755   POC GLUCOSE mg/dl 153* 137 122 123 160* 124 109 135 175* 121 100 103     Results from last 7 days   Lab Units 04/02/22  0542 04/01/22  0511 03/31/22  0458 03/30/22  1141   GLUCOSE RANDOM mg/dL 113 123 115 126         Results from last 7 days   Lab Units 03/30/22  1141   HEMOGLOBIN A1C % 7 5*   EAG mg/dl 169       Results from last 7 days   Lab Units 03/30/22  1141   PROTIME seconds 13 2   INR  1 02   PTT seconds 29     Results from last 7 days   Lab Units 03/30/22  1141   TSH 3RD GENERATON uIU/mL 2 784         Results from last 7 days   Lab Units 03/30/22  1141   LACTIC ACID mmol/L 0 5       Results from last 7 days   Lab Units 04/01/22  0511   FERRITIN ng/mL 38       Results from last 7 days   Lab Units 03/30/22  1122   INFLUENZA A PCR  Negative   INFLUENZA B PCR  Negative   RSV PCR  Negative     Results from last 7 days   Lab Units 03/30/22  1700 03/30/22  1141   BLOOD CULTURE   --  No Growth at 72 hrs  No Growth at 72 hrs     GRAM STAIN RESULT  1+ Gram positive cocci in pairs*  1+ Polys*  --    WOUND CULTURE  3+ Growth of Beta Hemolytic Streptococcus Group C*  Few Colonies of Staphylococcus aureus*  Few Colonies of Beta Hemolytic Streptococcus Group G*  Few Colonies of   --        Scheduled Medications:    amLODIPine, 10 mg, Oral, Daily  aspirin, 81 mg, Oral, Daily  atorvastatin, 80 mg, Oral, Daily With Dinner  carvedilol, 6 25 mg, Oral, BID With Meals  clindamycin, 300 mg, Intravenous, Q8H  enoxaparin, 60 mg, Subcutaneous, Daily  insulin lispro, 2-12 Units, Subcutaneous, 4x Daily (AC & HS)  iron sucrose, 300 mg, Intravenous, Daily  ketorolac, 30 mg, Intravenous, Q6H SHAZIA  lisinopril, 10 mg, Oral, Daily  nystatin, , Topical, TID      Continuous IV Infusions:     PRN Meds:  acetaminophen, 650 mg, Oral, Q6H PRN  albuterol, 2 puff, Inhalation, Q6H PRN  HYDROmorphone, 2 mg, Intravenous, Q4H PRN        Discharge Plan: to be determined     Network Utilization Review Department  ATTENTION: Please call with any questions or concerns to [phone] and carefully listen to the prompts so that you are directed to the right person  All voicemails are confidential   Sally Robertson all requests for admission clinical reviews, approved or denied determinations and any other requests to dedicated fax number below belonging to the campus where the patient is receiving treatment   List of dedicated fax numbers for the Facilities:  [address] DENIALS (Administrative/Medical Necessity) [phone]   [address] (Maternity/NICU/Pediatrics) [phone]   [address]  [address] [facility] [place] [address] [id number] [place] [address] [address] [facility] [facility] [phone]

## 2022-04-02 NOTE — PLAN OF CARE
Problem: Prexisting or High Potential for Compromised Skin Integrity  Goal: Skin integrity is maintained or improved  Description: INTERVENTIONS:  - Identify patients at risk for skin breakdown  - Assess and monitor skin integrity  - Assess and monitor nutrition and hydration status  - Monitor labs   - Assess for incontinence   - Turn and reposition patient  - Assist with mobility/ambulation  - Relieve pressure over bony prominences  - Avoid friction and shearing  - Provide appropriate hygiene as needed including keeping skin clean and dry  - Evaluate need for skin moisturizer/barrier cream  - Collaborate with interdisciplinary team   - Patient/family teaching  - Consider wound care consult   Outcome: Progressing     Problem: PAIN - ADULT  Goal: Verbalizes/displays adequate comfort level or baseline comfort level  Description: Interventions:  - Encourage patient to monitor pain and request assistance  - Assess pain using appropriate pain scale  - Administer analgesics based on type and severity of pain and evaluate response  - Implement non-pharmacological measures as appropriate and evaluate response  - Consider cultural and social influences on pain and pain management  - Notify physician/advanced practitioner if interventions unsuccessful or patient reports new pain  Outcome: Progressing     Problem: INFECTION - ADULT  Goal: Absence or prevention of progression during hospitalization  Description: INTERVENTIONS:  - Assess and monitor for signs and symptoms of infection  - Monitor lab/diagnostic results  - Monitor all insertion sites, i e  indwelling lines, tubes, and drains  - Monitor endotracheal if appropriate and nasal secretions for changes in amount and color  - Yulan appropriate cooling/warming therapies per order  - Administer medications as ordered  - Instruct and encourage patient and family to use good hand hygiene technique  - Identify and instruct in appropriate isolation precautions for identified infection/condition  Outcome: Progressing

## 2022-04-03 LAB
ANION GAP SERPL CALCULATED.3IONS-SCNC: 5 MMOL/L (ref 4–13)
BUN SERPL-MCNC: 26 MG/DL (ref 5–25)
CALCIUM SERPL-MCNC: 8.1 MG/DL (ref 8.3–10.1)
CHLORIDE SERPL-SCNC: 101 MMOL/L (ref 100–108)
CO2 SERPL-SCNC: 28 MMOL/L (ref 21–32)
CREAT SERPL-MCNC: 1.07 MG/DL (ref 0.6–1.3)
ERYTHROCYTE [DISTWIDTH] IN BLOOD BY AUTOMATED COUNT: 16.1 % (ref 11.6–15.1)
GFR SERPL CREATININE-BSD FRML MDRD: 86 ML/MIN/1.73SQ M
GLUCOSE SERPL-MCNC: 125 MG/DL (ref 65–140)
GLUCOSE SERPL-MCNC: 130 MG/DL (ref 65–140)
GLUCOSE SERPL-MCNC: 136 MG/DL (ref 65–140)
GLUCOSE SERPL-MCNC: 146 MG/DL (ref 65–140)
GLUCOSE SERPL-MCNC: 167 MG/DL (ref 65–140)
HCT VFR BLD AUTO: 32.5 % (ref 36.5–49.3)
HGB BLD-MCNC: 8.8 G/DL (ref 12–17)
MCH RBC QN AUTO: 21.9 PG (ref 26.8–34.3)
MCHC RBC AUTO-ENTMCNC: 27.1 G/DL (ref 31.4–37.4)
MCV RBC AUTO: 81 FL (ref 82–98)
PLATELET # BLD AUTO: 375 THOUSANDS/UL (ref 149–390)
PMV BLD AUTO: 9.1 FL (ref 8.9–12.7)
POTASSIUM SERPL-SCNC: 4.9 MMOL/L (ref 3.5–5.3)
RBC # BLD AUTO: 4.02 MILLION/UL (ref 3.88–5.62)
SODIUM SERPL-SCNC: 134 MMOL/L (ref 136–145)
WBC # BLD AUTO: 8.03 THOUSAND/UL (ref 4.31–10.16)

## 2022-04-03 PROCEDURE — 82948 REAGENT STRIP/BLOOD GLUCOSE: CPT

## 2022-04-03 PROCEDURE — 99232 SBSQ HOSP IP/OBS MODERATE 35: CPT | Performed by: FAMILY MEDICINE

## 2022-04-03 PROCEDURE — 80048 BASIC METABOLIC PNL TOTAL CA: CPT

## 2022-04-03 PROCEDURE — 85027 COMPLETE CBC AUTOMATED: CPT

## 2022-04-03 PROCEDURE — 94761 N-INVAS EAR/PLS OXIMETRY MLT: CPT

## 2022-04-03 RX ORDER — HYDROMORPHONE HCL/PF 1 MG/ML
1 SYRINGE (ML) INJECTION EVERY 4 HOURS PRN
Status: DISCONTINUED | OUTPATIENT
Start: 2022-04-03 | End: 2022-04-09 | Stop reason: HOSPADM

## 2022-04-03 RX ORDER — FAMOTIDINE 20 MG/1
20 TABLET, FILM COATED ORAL 2 TIMES DAILY
Status: DISCONTINUED | OUTPATIENT
Start: 2022-04-03 | End: 2022-04-09 | Stop reason: HOSPADM

## 2022-04-03 RX ORDER — ACETAMINOPHEN 325 MG/1
650 TABLET ORAL ONCE
Status: DISCONTINUED | OUTPATIENT
Start: 2022-04-03 | End: 2022-04-04

## 2022-04-03 RX ORDER — HYDROMORPHONE HCL/PF 1 MG/ML
0.5 SYRINGE (ML) INJECTION EVERY 4 HOURS PRN
Status: DISCONTINUED | OUTPATIENT
Start: 2022-04-03 | End: 2022-04-03

## 2022-04-03 RX ORDER — KETOROLAC TROMETHAMINE 30 MG/ML
15 INJECTION, SOLUTION INTRAMUSCULAR; INTRAVENOUS EVERY 6 HOURS SCHEDULED
Status: DISCONTINUED | OUTPATIENT
Start: 2022-04-03 | End: 2022-04-03

## 2022-04-03 RX ORDER — KETOROLAC TROMETHAMINE 10 MG/1
10 TABLET, FILM COATED ORAL EVERY 6 HOURS SCHEDULED
Status: DISCONTINUED | OUTPATIENT
Start: 2022-04-03 | End: 2022-04-04

## 2022-04-03 RX ORDER — CLINDAMYCIN PHOSPHATE 600 MG/50ML
600 INJECTION INTRAVENOUS EVERY 8 HOURS
Status: DISCONTINUED | OUTPATIENT
Start: 2022-04-03 | End: 2022-04-04

## 2022-04-03 RX ADMIN — CLINDAMYCIN PHOSPHATE 600 MG: 600 INJECTION, SOLUTION INTRAVENOUS at 10:21

## 2022-04-03 RX ADMIN — INSULIN LISPRO 2 UNITS: 100 INJECTION, SOLUTION INTRAVENOUS; SUBCUTANEOUS at 12:08

## 2022-04-03 RX ADMIN — CARVEDILOL 6.25 MG: 6.25 TABLET, FILM COATED ORAL at 16:46

## 2022-04-03 RX ADMIN — IRON SUCROSE 300 MG: 20 INJECTION, SOLUTION INTRAVENOUS at 10:20

## 2022-04-03 RX ADMIN — ATORVASTATIN CALCIUM 80 MG: 80 TABLET, FILM COATED ORAL at 16:46

## 2022-04-03 RX ADMIN — CARVEDILOL 6.25 MG: 6.25 TABLET, FILM COATED ORAL at 10:20

## 2022-04-03 RX ADMIN — KETOROLAC TROMETHAMINE 10 MG: 10 TABLET, FILM COATED ORAL at 13:28

## 2022-04-03 RX ADMIN — HYDROMORPHONE HYDROCHLORIDE 1 MG: 1 INJECTION, SOLUTION INTRAMUSCULAR; INTRAVENOUS; SUBCUTANEOUS at 12:09

## 2022-04-03 RX ADMIN — KETOROLAC TROMETHAMINE 10 MG: 10 TABLET, FILM COATED ORAL at 19:05

## 2022-04-03 RX ADMIN — AMLODIPINE BESYLATE 10 MG: 10 TABLET ORAL at 10:20

## 2022-04-03 RX ADMIN — HYDROMORPHONE HYDROCHLORIDE 2 MG: 2 INJECTION, SOLUTION INTRAMUSCULAR; INTRAVENOUS; SUBCUTANEOUS at 02:57

## 2022-04-03 RX ADMIN — CLINDAMYCIN PHOSPHATE 600 MG: 600 INJECTION, SOLUTION INTRAVENOUS at 19:06

## 2022-04-03 RX ADMIN — ENOXAPARIN SODIUM 60 MG: 60 INJECTION SUBCUTANEOUS at 10:20

## 2022-04-03 RX ADMIN — NYSTATIN: 100000 POWDER TOPICAL at 20:49

## 2022-04-03 RX ADMIN — HYDROMORPHONE HYDROCHLORIDE 2 MG: 2 INJECTION, SOLUTION INTRAMUSCULAR; INTRAVENOUS; SUBCUTANEOUS at 07:25

## 2022-04-03 RX ADMIN — FAMOTIDINE 20 MG: 20 TABLET, FILM COATED ORAL at 19:05

## 2022-04-03 RX ADMIN — ASPIRIN 81 MG: 81 TABLET, COATED ORAL at 10:20

## 2022-04-03 RX ADMIN — LISINOPRIL 10 MG: 10 TABLET ORAL at 10:20

## 2022-04-03 RX ADMIN — CLINDAMYCIN IN 5 PERCENT DEXTROSE 300 MG: 6 INJECTION, SOLUTION INTRAVENOUS at 02:50

## 2022-04-03 RX ADMIN — HYDROMORPHONE HYDROCHLORIDE 1 MG: 1 INJECTION, SOLUTION INTRAMUSCULAR; INTRAVENOUS; SUBCUTANEOUS at 16:46

## 2022-04-03 RX ADMIN — HYDROMORPHONE HYDROCHLORIDE 1 MG: 1 INJECTION, SOLUTION INTRAMUSCULAR; INTRAVENOUS; SUBCUTANEOUS at 20:48

## 2022-04-03 NOTE — PROGRESS NOTES
[facility] Progress Note   Jethro Corbett 37 y o  male   MRN: [id number]    Unit/Bed#: [address] 404-01 Encounter: [id number]    SUMMARY: 37 y o  male patient admitted for management of lower abdominal cellulitis  Currently on Antibiotics  Antibiotics were switched to Clindamycin, 300 mg, IV, Q8H  Currently in Dilaudid 2 mg, IV, Q4H PRN and Toradol 30 mg, IV, Q6H, for pain management  Cellulitis shows slight improvement but has not resolved  Will consider consulting ID if antibiotics need to be changed in again  Patient currently on 6 L/min of Oxygen per midflow NC  Continues to saturate in the 80's to low 90's  F: None   E: Sodium 134; Will monitor  N: Diet Markie/CHO Controlled; Consistent Carbohydrate Diet Level 2 (5 carb servings/75 grams CHO/meal)  D: Enoxaparin (LOVENOX) subcutaneous injection 60 mg, Daily   A: Clindamycin, 300 mg, IV, Q8H (Started on 4/2 @1100)     Disposition: Continue on Med - Surg   Code Status: Level 1 - Full Code    ASSESSMENT/PLAN:     * Cellulitis of multiple sites of trunk  Assessment & Plan  Patient with a history of multiple sites of cellulitis with last admission in 12/2021 presented with cellulitis of abdomen and pannus  Patient also has wound with some drainage  On previous admission, patient was started on Ancef 2 g q 8 and discharged on Keflex  Wound cultures grew beta-hemolytic strep,Staphylococcus haemolyticus which was susceptible to vancomycin    ED course:  Vancomycin    CT abdomen pelvis 3/30: No discrete intra-abdominal or pelvic abscess  Skin thickening and inflammatory stranding in the lower anterior abdominal wall pannus suggestive of cellulitis  Wound Culture 3/30: 1+ Gram positive cocci in pairs, 1+ Polys    Wound Care consulted   Nystatin powder b i d   For atrial title and abdominal pannus and groin area  Switch vancomycin and Ancef to clindamycin 300 mg, IV, Q8H  Continue to monitor expansion of cellulitis    Hidradenitis suppurativa  Assessment & Plan  Patient has history of hidranitis suppurativa with wounds currently present on left groin and abdominal pannus     Wound care consulted  Wound cultures pending   Daily dressings   Continue antibiotics as noted above  Swelling of left lower extremity  Assessment & Plan  Patient with a history of hidradenitis suprativa bilateral groin and thigh  Patient previously had cyst removed from his left thigh which required anesthesia  Patient has been following up with wound care weekly  Per patient, he is now to see them every 3 weeks  Patient's left lower extremity circumference is significantly increased compared to right  States that this is chronic problem and actually looks better than usual  Pt has history of lymphedema on left LE    Doppler ultrasound of left lower extremity to rule out DVT  Lovenox BID  Continue to monitor    Type 2 diabetes mellitus with diabetic polyneuropathy, without long-term current use of insulin [facility])  Assessment & Plan  Lab Results   Component Value Date    HGBA1C 7 5 (H) 03/30/2022       Recent Labs     03/31/22  0716 03/31/22  1106 03/31/22  1656 03/31/22  2153   POCGLU 121 175* 135 109       Blood Sugar Average: Last 72 hrs:  (P) 123 [id number]     Last A1c 7 0 in 11/2021   Repeat A1c 7 5    Held home medications at this time   Will Keep monitoring glucose   ISS  And monitor for basal and meal time insuline requirement While hospitalized     Nocturnal hypoxia  Assessment & Plan  Oxygen saturation on admission 99% on room air  In 12/2021, patient had overnight O2 evaluation which showed significant hypoxemia with lowest desaturation of 59%  At that time, patient was advised to follow-up with pulmonology for complete sleep study      Will recommend pulmonology follow-up prior to discharge  Titrate oxygen as needed at night    - ambulatory O2 eval pending    Type 1 non-ST elevation myocardial infarction (NSTEMI) [facility])  Assessment & Plan  Patient has a history of MI with stent placement in   Patient follows with Dr Nasir Montague outpatient    Patient currently on Coreg 6 25 mg b i d  Essential hypertension  Assessment & Plan  BP on admission 180s/90s    Continue home medications:  Amlodipine 10mg, carvedilol 6 25mg and lisinopril 10mg   Monitor BP     Coronary artery disease  Assessment & Plan  Patient with a history of cardiac catheterization in  and   Follows with cardiology outpatient  Denies any chest pain and shortness of breath on admission  Patient currently takes Coreg 6 25 mg b i d , ASA 81 mg daily  Patient reports not taking Brilinta    Repeat EKG  Continue home medications  -------------------------------------------------------------------------------------  HPI - 24HR EVENTS    There were no overnight concerning events or episodes of fever  Patient's vital signs were stable throughout the night  No complaints   ---------------------------------------------------------------------------------------  SUBJECTIVE    Went to round on patient throughout the morning  He was encountered lying in bed  Was AAO x3 and in NAD  He mentioned that he feels the same, with no major improvement in his pain  Review of Systems    Patient denied not fever, chills or concerning events during the night  Had no SOB, chest pain, N/V, diarrhea, abdominal pain, or any other related symptoms  Has been tolerating diet  Has been urinating and defecating adequately       No other complaints    ---------------------------------------------------------------------------------------  OBJECTIVE    Vitals     Vitals:    22 2225 22 2230 22 2240 22 0731   BP: 151/72   151/73   BP Location: Right arm      Pulse: 60 (!) 52 66 62   Resp: 19      Temp: 98 1 °F (36 7 °C)   98 °F (36 7 °C)   TempSrc: Oral      SpO2: 90% (!) 87% (!) 87% 90%     Temp (24hrs), Av 1 °F (36 7 °C), Min:98 °F (36 7 °C), Max:98 1 °F (36 7 °C)  Current: Temperature: 98 °F (36 7 °C)    Respiratory:    SpO2: 90 %,  O2 Device: Mid flow nasal cannula    Nasal Cannula O2 Flow Rate (L/min): 6 L/min    PHYSICAL EXAM:    Physical Exam  Constitutional:       Appearance: He is obese  He is not diaphoretic  HENT:      Head: Normocephalic and atraumatic  Right Ear: External ear normal       Left Ear: External ear normal       Nose: Nose normal    Eyes:      General: No scleral icterus  Conjunctiva/sclera: Conjunctivae normal    Cardiovascular:      Rate and Rhythm: Normal rate and regular rhythm  Pulses: Normal pulses  Heart sounds: Normal heart sounds  Pulmonary:      Effort: Pulmonary effort is normal       Breath sounds: Normal breath sounds  Abdominal:      Palpations: Abdomen is soft  Tenderness: There is abdominal tenderness  Comments: Large habitus  Cellulitis below the umbilicus diffusely throughout the lower abdomen - slowly improving - less erythematous   Skin:     General: Skin is warm and dry  Comments: Cellulitis of the lower posterior left leg    Neurological:      Mental Status: He is oriented to person, place, and time  Psychiatric:         Mood and Affect: Mood normal          Behavior: Behavior normal          Thought Content:  Thought content normal          Judgment: Judgment normal      Laboratory and Diagnostics:    Results from last 7 days   Lab Units 04/03/22  0454 04/02/22  0542 04/01/22  0511 03/31/22 0458 03/30/22  1141   WBC Thousand/uL 8 03 7 06 6 65 7 35 8 23   HEMOGLOBIN g/dL 8 8* 9 3* 9 1* 9 0* 9 8*   HEMATOCRIT % 32 5* 34 1* 31 5* 32 6* 35 1*   PLATELETS Thousands/uL 375 355 333 295 342   NEUTROS PCT %  --  68  --  68 80*   MONOS PCT %  --  10  --  11 9     Results from last 7 days   Lab Units 04/03/22 0454 04/02/22  0542 04/01/22  0511 03/31/22 0458 03/30/22  1141   SODIUM mmol/L 134* 131* 134* 137 138   POTASSIUM mmol/L 4 9 5 0 4 6 4 3 4 1   CHLORIDE mmol/L 101 100 99* 100 102   CO2 mmol/L 28 25 22 29 32   ANION GAP mmol/L 5 6 13 8 4   BUN mg/dL 26* 31* 27* 20 16   CREATININE mg/dL 1 07 1 22 1 13 1 09 0 96   CALCIUM mg/dL 8 1* 8 4 8 2* 8 0* 8 1*   GLUCOSE RANDOM mg/dL 136 113 123 115 126   ALT U/L  --  15 16 19 19   AST U/L  --  26 22 19 25   ALK PHOS U/L  --  146* 125* 99 98   ALBUMIN g/dL  --  2 3* 2 3* 2 2* 2 4*   TOTAL BILIRUBIN mg/dL  --  0 35 0 28 0 28 0 23     Results from last 7 days   Lab Units 03/31/22  0458   MAGNESIUM mg/dL 1 9      Results from last 7 days   Lab Units 03/30/22  1141   INR  1 02   PTT seconds 29          Results from last 7 days   Lab Units 03/30/22  1141   LACTIC ACID mmol/L 0 5     MICROBIOLOGY RESULTS:    Results from last 7 days   Lab Units 03/30/22  1700 03/30/22  1141   BLOOD CULTURE   --  No Growth at 72 hrs  No Growth at 72 hrs  GRAM STAIN RESULT  1+ Gram positive cocci in pairs*  1+ Polys*  --    WOUND CULTURE  3+ Growth of Beta Hemolytic Streptococcus Group C*  Few Colonies of Staphylococcus aureus*  Few Colonies of Beta Hemolytic Streptococcus Group G*  Few Colonies of   --      Imaging: I have personally reviewed pertinent reports  VAS lower limb venous duplex study, unilateral/limited   Final Result      CT abdomen pelvis w contrast   Final Result      No discrete intra-abdominal or pelvic abscess  Skin thickening and inflammatory stranding in the lower anterior abdominal wall pannus suggestive of cellulitis  XR chest 1 view portable   Final Result      No acute cardiopulmonary disease  Intake and Output    I/O       04/01 0701 04/02 0700 04/02 0701  04/03 0700 04/03 0701 04/04 0700    P  O   970     I V   10     Total Intake  980     Urine 1      Total Output 1      Net -1 +980            Unmeasured Urine Occurrence  2 x       Height and Weights           There is no height or weight on file to calculate BMI       Weight (last 2 days)     None        Nutrition         Diet Orders   (From admission, onward) Start     Ordered    03/30/22 1716  Diet Markie/CHO Controlled; Consistent Carbohydrate Diet Level 2 (5 carb servings/75 grams CHO/meal)  Diet effective now        References:    Nutrtion Support Algorithm Enteral vs  Parenteral   Question Answer Comment   Diet Type Markie/CHO Controlled    Markie/CHO Controlled Consistent Carbohydrate Diet Level 2 (5 carb servings/75 grams CHO/meal)    RD to adjust diet per protocol? Yes        03/30/22 1715    03/30/22 1627  Room Service  Once        Question:  Type of Service  Answer:  Room Service-Appropriate    03/30/22 1626              Active Medications    Scheduled Meds:    Current Facility-Administered Medications   Medication Dose Route Frequency Provider Last Rate    acetaminophen  650 mg Oral Q6H PRN Boubacar Neumann MD      albuterol  2 puff Inhalation Q6H PRN Nelly Leyla, DO      amLODIPine  10 mg Oral Daily Nelly Leyla, [address]      aspirin  81 mg Oral Daily Nelly Leyla, [address]      atorvastatin  80 mg Oral Daily With Fito-Kay, [address]      carvedilol  6 25 mg Oral BID With Meals Nelly Leyla, DO      clindamycin  300 mg Intravenous Q8H Zack Hare  mg (04/03/22 0250)    enoxaparin  60 mg Subcutaneous Daily Zack Hare MD      HYDROmorphone  2 mg Intravenous Q4H PRN Zack Hare MD      insulin lispro  2-12 Units Subcutaneous 4x Daily [facility] & ) Nelly Leyla, DO      iron sucrose  300 mg Intravenous Daily Zack Hare MD      lisinopril  10 mg Oral Daily Nelly Leyla, DO      nystatin   Topical TID Nelly Leyla, DO     PRN Meds:     acetaminophen, 650 mg, Q6H PRN  albuterol, 2 puff, Q6H PRN  HYDROmorphone, 2 mg, Q4H PRN    Invasive Devices Review    Invasive Devices  Report    Peripheral Intravenous Line            Peripheral IV 04/02/22 Left Hand 1 day            It was a pleasure to be of service of Julien Shah MD, MSMS     [address] PGY1 - [employer]      Portions of the record may have been created with voice recognition software  Occasional wrong word or "sound a like" substitutions may have occurred due to the inherent limitations of voice recognition software  Read the chart carefully and recognize, using context, where substitutions have occurred

## 2022-04-03 NOTE — NURSING NOTE
Patient saturation was falling below 90's frequently overnight, Provider on duty made aware, no new orders

## 2022-04-03 NOTE — PLAN OF CARE
Problem: MOBILITY - ADULT  Goal: Maintain or return to baseline ADL function  Description: INTERVENTIONS:  -  Assess patient's ability to carry out ADLs; assess patient's baseline for ADL function and identify physical deficits which impact ability to perform ADLs (bathing, care of mouth/teeth, toileting, grooming, dressing, etc )  - Assess/evaluate cause of self-care deficits   - Assess range of motion  - Assess patient's mobility; develop plan if impaired  - Assess patient's need for assistive devices and provide as appropriate  - Encourage maximum independence but intervene and supervise when necessary  - Involve family in performance of ADLs  - Assess for home care needs following discharge   - Consider OT consult to assist with ADL evaluation and planning for discharge  - Provide patient education as appropriate  Outcome: Progressing  Goal: Maintains/Returns to pre admission functional level  Description: INTERVENTIONS:  - Perform BMAT or MOVE assessment daily    - Set and communicate daily mobility goal to care team and patient/family/caregiver     - Collaborate with rehabilitation services on mobility goals if consulted  - Stand patient 3 times a day  - Ambulate patient 3 times a day  - Out of bed to chair 3 times a day   - Out of bed for meals 3 times a day  - Out of bed for toileting  - Record patient progress and toleration of activity level   Outcome: Progressing     Problem: Prexisting or High Potential for Compromised Skin Integrity  Goal: Skin integrity is maintained or improved  Description: INTERVENTIONS:  - Identify patients at risk for skin breakdown  - Assess and monitor skin integrity  - Assess and monitor nutrition and hydration status  - Monitor labs   - Assess for incontinence   - Turn and reposition patient  - Assist with mobility/ambulation  - Relieve pressure over bony prominences  - Avoid friction and shearing  - Provide appropriate hygiene as needed including keeping skin clean and dry  - Evaluate need for skin moisturizer/barrier cream  - Collaborate with interdisciplinary team   - Patient/family teaching  - Consider wound care consult   Outcome: Progressing     Problem: Potential for Falls  Goal: Patient will remain free of falls  Description: INTERVENTIONS:  - Educate patient/family on patient safety including physical limitations  - Instruct patient to call for assistance with activity   - Consult OT/PT to assist with strengthening/mobility   - Keep Call bell within reach  - Keep bed low and locked with side rails adjusted as appropriate  - Keep care items and personal belongings within reach  - Initiate and maintain comfort rounds  - Make Fall Risk Sign visible to staff  - Offer Toileting every 2 Hours, in advance of need    Outcome: Progressing     Problem: Nutrition/Hydration-ADULT  Goal: Nutrient/Hydration intake appropriate for improving, restoring or maintaining nutritional needs  Description: Monitor and assess patient's nutrition/hydration status for malnutrition  Collaborate with interdisciplinary team and initiate plan and interventions as ordered  Monitor patient's weight and dietary intake as ordered or per policy  Utilize nutrition screening tool and intervene as necessary  Determine patient's food preferences and provide high-protein, high-caloric foods as appropriate       INTERVENTIONS:  - Monitor oral intake, urinary output, labs, and treatment plans  - Assess nutrition and hydration status and recommend course of action  - Evaluate amount of meals eaten  - Assist patient with eating if necessary   - Allow adequate time for meals  - Recommend/ encourage appropriate diets, oral nutritional supplements, and vitamin/mineral supplements  - Order, calculate, and assess calorie counts as needed  - Recommend, monitor, and adjust tube feedings and TPN/PPN based on assessed needs  - Assess need for intravenous fluids  - Provide specific nutrition/hydration education as appropriate  - Include patient/family/caregiver in decisions related to nutrition  Outcome: Progressing     Problem: PAIN - ADULT  Goal: Verbalizes/displays adequate comfort level or baseline comfort level  Description: Interventions:  - Encourage patient to monitor pain and request assistance  - Assess pain using appropriate pain scale  - Administer analgesics based on type and severity of pain and evaluate response  - Implement non-pharmacological measures as appropriate and evaluate response  - Consider cultural and social influences on pain and pain management  - Notify physician/advanced practitioner if interventions unsuccessful or patient reports new pain  Outcome: Progressing     Problem: INFECTION - ADULT  Goal: Absence or prevention of progression during hospitalization  Description: INTERVENTIONS:  - Assess and monitor for signs and symptoms of infection  - Monitor lab/diagnostic results  - Monitor all insertion sites, i e  indwelling lines, tubes, and drains  - Monitor endotracheal if appropriate and nasal secretions for changes in amount and color  - [place] appropriate cooling/warming therapies per order  - Administer medications as ordered  - Instruct and encourage patient and family to use good hand hygiene technique  - Identify and instruct in appropriate isolation precautions for identified infection/condition  Outcome: Progressing     Problem: DISCHARGE PLANNING  Goal: Discharge to home or other facility with appropriate resources  Description: INTERVENTIONS:  - Identify barriers to discharge w/patient and caregiver  - Arrange for needed discharge resources and transportation as appropriate  - Identify discharge learning needs (meds, wound care, etc )  - Arrange for interpretive services to assist at discharge as needed  - Refer to Case Management Department for coordinating discharge planning if the patient needs post-hospital services based on physician/advanced practitioner order or complex needs related to functional status, cognitive ability, or social support system  Outcome: Progressing     Problem: Knowledge Deficit  Goal: Patient/family/caregiver demonstrates understanding of disease process, treatment plan, medications, and discharge instructions  Description: Complete learning assessment and assess knowledge base    Interventions:  - Provide teaching at level of understanding  - Provide teaching via preferred learning methods  Outcome: Progressing

## 2022-04-04 LAB
ANION GAP SERPL CALCULATED.3IONS-SCNC: 5 MMOL/L (ref 4–13)
BACTERIA BLD CULT: NORMAL
BACTERIA BLD CULT: NORMAL
BUN SERPL-MCNC: 20 MG/DL (ref 5–25)
CALCIUM SERPL-MCNC: 8.4 MG/DL (ref 8.3–10.1)
CHLORIDE SERPL-SCNC: 102 MMOL/L (ref 100–108)
CO2 SERPL-SCNC: 30 MMOL/L (ref 21–32)
CREAT SERPL-MCNC: 0.99 MG/DL (ref 0.6–1.3)
GFR SERPL CREATININE-BSD FRML MDRD: 94 ML/MIN/1.73SQ M
GLUCOSE SERPL-MCNC: 120 MG/DL (ref 65–140)
GLUCOSE SERPL-MCNC: 120 MG/DL (ref 65–140)
GLUCOSE SERPL-MCNC: 124 MG/DL (ref 65–140)
GLUCOSE SERPL-MCNC: 158 MG/DL (ref 65–140)
GLUCOSE SERPL-MCNC: 189 MG/DL (ref 65–140)
POTASSIUM SERPL-SCNC: 4.8 MMOL/L (ref 3.5–5.3)
SODIUM SERPL-SCNC: 137 MMOL/L (ref 136–145)

## 2022-04-04 PROCEDURE — 82948 REAGENT STRIP/BLOOD GLUCOSE: CPT

## 2022-04-04 PROCEDURE — 97530 THERAPEUTIC ACTIVITIES: CPT

## 2022-04-04 PROCEDURE — 99222 1ST HOSP IP/OBS MODERATE 55: CPT | Performed by: INTERNAL MEDICINE

## 2022-04-04 PROCEDURE — 97162 PT EVAL MOD COMPLEX 30 MIN: CPT

## 2022-04-04 PROCEDURE — 99232 SBSQ HOSP IP/OBS MODERATE 35: CPT | Performed by: STUDENT IN AN ORGANIZED HEALTH CARE EDUCATION/TRAINING PROGRAM

## 2022-04-04 PROCEDURE — 80048 BASIC METABOLIC PNL TOTAL CA: CPT

## 2022-04-04 RX ORDER — LANOLIN ALCOHOL/MO/W.PET/CERES
6 CREAM (GRAM) TOPICAL
Status: DISCONTINUED | OUTPATIENT
Start: 2022-04-04 | End: 2022-04-09 | Stop reason: HOSPADM

## 2022-04-04 RX ORDER — OXYCODONE HYDROCHLORIDE AND ACETAMINOPHEN 5; 325 MG/1; MG/1
1 TABLET ORAL EVERY 6 HOURS SCHEDULED
Status: DISCONTINUED | OUTPATIENT
Start: 2022-04-04 | End: 2022-04-09 | Stop reason: HOSPADM

## 2022-04-04 RX ORDER — ACETAMINOPHEN 325 MG/1
650 TABLET ORAL EVERY 6 HOURS PRN
Status: DISCONTINUED | OUTPATIENT
Start: 2022-04-04 | End: 2022-04-09 | Stop reason: HOSPADM

## 2022-04-04 RX ORDER — ZINC SULFATE 50(220)MG
220 CAPSULE ORAL DAILY
Status: DISCONTINUED | OUTPATIENT
Start: 2022-04-04 | End: 2022-04-09 | Stop reason: HOSPADM

## 2022-04-04 RX ORDER — OXYCODONE HYDROCHLORIDE AND ACETAMINOPHEN 5; 325 MG/1; MG/1
1 TABLET ORAL EVERY 4 HOURS PRN
Status: DISCONTINUED | OUTPATIENT
Start: 2022-04-04 | End: 2022-04-04

## 2022-04-04 RX ORDER — CEFAZOLIN SODIUM 2 G/50ML
2000 SOLUTION INTRAVENOUS EVERY 6 HOURS
Status: DISCONTINUED | OUTPATIENT
Start: 2022-04-04 | End: 2022-04-09 | Stop reason: HOSPADM

## 2022-04-04 RX ADMIN — CLINDAMYCIN PHOSPHATE 600 MG: 600 INJECTION, SOLUTION INTRAVENOUS at 11:44

## 2022-04-04 RX ADMIN — HYDROMORPHONE HYDROCHLORIDE 1 MG: 1 INJECTION, SOLUTION INTRAMUSCULAR; INTRAVENOUS; SUBCUTANEOUS at 13:13

## 2022-04-04 RX ADMIN — HYDROMORPHONE HYDROCHLORIDE 1 MG: 1 INJECTION, SOLUTION INTRAMUSCULAR; INTRAVENOUS; SUBCUTANEOUS at 04:47

## 2022-04-04 RX ADMIN — CARVEDILOL 6.25 MG: 6.25 TABLET, FILM COATED ORAL at 08:51

## 2022-04-04 RX ADMIN — CEFAZOLIN SODIUM 2000 MG: 2 SOLUTION INTRAVENOUS at 22:16

## 2022-04-04 RX ADMIN — KETOROLAC TROMETHAMINE 10 MG: 10 TABLET, FILM COATED ORAL at 00:12

## 2022-04-04 RX ADMIN — HYDROMORPHONE HYDROCHLORIDE 1 MG: 1 INJECTION, SOLUTION INTRAMUSCULAR; INTRAVENOUS; SUBCUTANEOUS at 08:51

## 2022-04-04 RX ADMIN — CEFAZOLIN SODIUM 2000 MG: 2 SOLUTION INTRAVENOUS at 17:05

## 2022-04-04 RX ADMIN — ZINC SULFATE 220 MG (50 MG) CAPSULE 220 MG: CAPSULE at 12:26

## 2022-04-04 RX ADMIN — CARVEDILOL 6.25 MG: 6.25 TABLET, FILM COATED ORAL at 17:05

## 2022-04-04 RX ADMIN — ATORVASTATIN CALCIUM 80 MG: 80 TABLET, FILM COATED ORAL at 17:05

## 2022-04-04 RX ADMIN — FAMOTIDINE 20 MG: 20 TABLET, FILM COATED ORAL at 17:05

## 2022-04-04 RX ADMIN — FAMOTIDINE 20 MG: 20 TABLET, FILM COATED ORAL at 08:51

## 2022-04-04 RX ADMIN — INSULIN LISPRO 2 UNITS: 100 INJECTION, SOLUTION INTRAVENOUS; SUBCUTANEOUS at 22:17

## 2022-04-04 RX ADMIN — INSULIN LISPRO 2 UNITS: 100 INJECTION, SOLUTION INTRAVENOUS; SUBCUTANEOUS at 11:44

## 2022-04-04 RX ADMIN — CLINDAMYCIN PHOSPHATE 600 MG: 600 INJECTION, SOLUTION INTRAVENOUS at 02:54

## 2022-04-04 RX ADMIN — HYDROMORPHONE HYDROCHLORIDE 1 MG: 1 INJECTION, SOLUTION INTRAMUSCULAR; INTRAVENOUS; SUBCUTANEOUS at 00:53

## 2022-04-04 RX ADMIN — KETOROLAC TROMETHAMINE 10 MG: 10 TABLET, FILM COATED ORAL at 06:00

## 2022-04-04 RX ADMIN — IRON SUCROSE 300 MG: 20 INJECTION, SOLUTION INTRAVENOUS at 08:51

## 2022-04-04 RX ADMIN — HYDROMORPHONE HYDROCHLORIDE 1 MG: 1 INJECTION, SOLUTION INTRAMUSCULAR; INTRAVENOUS; SUBCUTANEOUS at 22:16

## 2022-04-04 RX ADMIN — LISINOPRIL 10 MG: 10 TABLET ORAL at 08:51

## 2022-04-04 RX ADMIN — HYDROMORPHONE HYDROCHLORIDE 1 MG: 1 INJECTION, SOLUTION INTRAMUSCULAR; INTRAVENOUS; SUBCUTANEOUS at 18:19

## 2022-04-04 RX ADMIN — Medication 6 MG: at 22:17

## 2022-04-04 RX ADMIN — ENOXAPARIN SODIUM 60 MG: 60 INJECTION SUBCUTANEOUS at 08:51

## 2022-04-04 RX ADMIN — ASPIRIN 81 MG: 81 TABLET, COATED ORAL at 08:51

## 2022-04-04 RX ADMIN — AMLODIPINE BESYLATE 10 MG: 10 TABLET ORAL at 08:51

## 2022-04-04 RX ADMIN — OXYCODONE HYDROCHLORIDE AND ACETAMINOPHEN 1 TABLET: 5; 325 TABLET ORAL at 11:44

## 2022-04-04 RX ADMIN — OXYCODONE HYDROCHLORIDE AND ACETAMINOPHEN 1 TABLET: 5; 325 TABLET ORAL at 17:05

## 2022-04-04 NOTE — CONSULTS
Consultation - Infectious Disease   Paul Lees 37 y o  male MRN: [id number]  Unit/Bed#: [address] [address] Encounter: [id number]      IMPRESSION & RECOMMENDATIONS:   1  Abdominal wall panniculitis  Clinically appearing streptococcal   03/30/2022 wound culture from the abdominal wall has grown 3+ group C Streptococcus, few colonies of MSSA and few colonies of group G Streptococcus  03/30/2022 CT abdomen and pelvis consistent with panniculitis, no intra-abdominal abscess  -discontinue Clindamycin  -restart cefazolin high-dose 2 g IV every 6 hours  -monitor serial exam over next 48 hours   -Once significantly improved, can transition to high-dose Keflex 1 g every 6 hours to complete a total 2 week antibiotic course from restart cefazolin day  2  Groin intertrigo and chronic left groin thigh wound  Possible portal of entry  -local topical and wound care per wound care RN  -serial exam    3   History of left groin hidradenitis   Status post excision April 2021 with chronic wound      4   Morbid obesity  BMI 59 2 O   Risk factor for infection, poor wound healing   -lifestyle modification and weight loss management discussed and patient receptive    5   Poorly controlled DM   With hyperglycemia   Recent A1c 03/2022 7  5   Risk factor for infection   -blood glucose management per primary care team    Antibiotics:  Clindamycin day 3    I have discussed the above management plan in detail with patient, RN, and the primary service    Extensive review of the medical records in epic including review of the notes, radiographs, and laboratory results     HISTORY OF PRESENT ILLNESS:  Reason for Consult:  Cellulitis of abdominal wall    HPI: Paul Lees is a 37y o  year old male with morbid obesity, CAD, hypertension, DM 2 without insulin, NSTEMI, nocturnal hypoxia, left groin hydradenitis suppurativa, Group G Strep/cellulitis 2021 who presented to the ER 3/30/22 with a two days of progressive lower abdominal pain and along with associated fever, chills and bodyaches  He was started empirically on vancomycin and cefazolin  With slow clinical improvement, antibiotics were changed to clindamycin  Patient still is having slow clinical improvement, and therefore Infectious Disease is now being consulted regarding evaluation and management of cellulitis of abdominal wall  03/30/2022 CT abdomen and pelvis consistent with panniculitis, no intra-abdominal abscess  On 03/30/2022 a wound culture from the abdominal wall has grown 3+ group C Streptococcus, few colonies of MSSA and few colonies of group G Streptococcus  Patient felt his abdominal pannus had been softer on the cefazolin  In review of prior treatments, patient had left lower extremity cellulitis in June of 2021 that was treated in-house with cefazolin and then transition to Keflex on discharge to complete a total 14 day course  Patient denies any shaking shivering chills, fever, nausea, vomiting, diarrhea or any dysuria  He denies any chest pain, shortness of breath or cough  He has a lot of moisture in the groin and under his pannus  REVIEW OF SYSTEMS:  A complete review of systems is negative other than that noted in the HPI      PAST MEDICAL HISTORY:  Past Medical History:   Diagnosis Date    Arthritis     Breathing difficulty     Coronary artery disease     Diabetes mellitus ([facility] [address] [facility]     Heart disease     CAD   s/p ptca with 1 stent 2012    Hidradenitis suppurativa     groin    History of transfusion     Hyperlipidemia     Hypertension     MI (myocardial infarction) ([facility] [address] [facility]     " silent " M I  in the past    Morbid obesity with BMI of 50 0-59 9, adult ([facility] [address] [facility]     Nicotine dependence     Obesity     Pilonidal cyst      Past Surgical History:   Procedure Laterality Date    CARDIAC SURGERY      CORONARY ANGIOPLASTY WITH STENT PLACEMENT      INCISION AND DRAINAGE OF WOUND N/A 1/24/2017    Procedure: INCISION AND DRAINAGE (I&D) BUTTOCK, PILONIDAL CYST; Surgeon: Alba Mchugh MD;  Location: [facility];  Service:    [place] LEG SURGERY Left     I&D of left leg 2012    ME DEBRIDEMENT, SKIN, SUB-Q TISSUE,=<20 SQ CM Left 2021    Procedure: DEBRIDEMENT WOUND [facility] OUT); Surgeon: Nani Saucedo MD;  [facility];  Service: General    ME EXC SKIN BENIG >4 CM TRUNK,ARM,LEG Left 2021    Procedure: EXCISION THIGH MASS;  Surgeon: Nani Saucedo MD;  [facility];  Service: General    ME EXC SWEAT GLAND Stella Mustard Left 2021    Procedure: EXCISION PERINEAL ABSCESS LEFT THIGH;  Surgeon: Nani Saucedo MD;  [facility];  Service: General    ME NEG PRESS WOUND TX, < 50 CM Left 2021    Procedure: APPLICATION VAC DRESSING THIGH;  Surgeon: Nani Saucedo MD;  [facility];  Service: General    WOUND DEBRIDEMENT Left 2021    Procedure: DEBRIDEMENT WOUND AND DRESSING CHANGE ([address] OUT); Surgeon: Freida Yost DO;  [facility];  Service: General    WOUND DEBRIDEMENT Left 2021    Procedure: DEBRIDEMENT LOWER EXTREMITY [facility] OUT), left groin and thigh;  Surgeon: Nani Saucedo MD;  [facility];  Service: General    WOUND DEBRIDEMENT Left 2021    Procedure: DEBRIDEMENT LOWER EXTREMITY ([address] OUT); Surgeon:  Jacky Ortiz DO;  [facility];  Service: General    WOUND DEBRIDEMENT Left 2021    Procedure: [place] left thigh wound and closure;  Surgeon: Amie Mancuso DO;  [facility];  Service: General       FAMILY HISTORY:  Non-contributory    SOCIAL HISTORY:  Social History   Social History     Substance and Sexual Activity   Alcohol Use Not Currently    Comment: rarely     Social History     Substance and Sexual Activity   Drug Use No     Social History     Tobacco Use   Smoking Status Former Smoker    Packs/day: 1 00    Years: 20 00    Pack years: 20 00    Types: Cigarettes    Quit date: 2021    Years since quittin 0   Smokeless Tobacco Never Used ALLERGIES:  Allergies   Allergen Reactions    Amoxicillin Hives     childhood       MEDICATIONS:  All current active medications have been reviewed  PHYSICAL EXAM:  Temp:  [97 3 °F (36 3 °C)-97 9 °F (36 6 °C)] 97 8 °F (36 6 °C)  HR:  [61-66] 61  Resp:  [18-20] 18  BP: (137-148)/(61-75) 137/61  SpO2:  [80 %-93 %] 93 %  Temp (24hrs), Av 7 °F (36 5 °C), Min:97 3 °F (36 3 °C), Max:97 9 °F (36 6 °C)  Current: Temperature: 97 8 °F (36 6 °C)    Intake/Output Summary (Last 24 hours) at 2022 1534  Last data filed at 2022 0849  Gross per 24 hour   Intake 29 ml   Output --   Net 29 ml       General Appearance:  37year-old morbidly obese male sitting fairly comfortably in bedside, nontoxic, and in no distress   Head:  Normocephalic, without obvious abnormality, atraumatic   Eyes:  Conjunctiva pink and sclera anicteric, both eyes   Nose: Nares normal, mucosa normal, no drainage   Throat: Oropharynx moist without lesions   Neck: Supple, symmetrical, no adenopathy, no tenderness/mass/nodules   Back:   Symmetric, no curvature, ROM normal, no CVA tenderness   Lungs:   Clear to auscultation bilaterally, respirations unlabored   Chest Wall:  No tenderness or deformity   Heart:  RRR; no murmur, rub or gallop   Abdomen: Morbidly obese edematous pannus with with pink lower half fading slowly within marked border, no fluctuant drainage, positive bowel sounds    Extremities: No cyanosis, clubbing, + lower extremity venous staining edema   Skin: No rashes  Moist intertrigo bilateral groin folds  Shallow  Shallow left thigh wound with clean base and surrounding edges  IV site nontender   Lymph nodes: Cervical, supraclavicular nodes normal   Neurologic: Alert and oriented times 3, extremity strength 5/5 and symmetric       LABS, IMAGING, & OTHER STUDIES:  Lab Results:  I have personally reviewed pertinent labs    Results from last 7 days   Lab Units 22  0454 22  0542 22  0511   WBC Thousand/uL 8 03 7  06 6 65   HEMOGLOBIN g/dL 8 8* 9 3* 9 1*   PLATELETS Thousands/uL 375 355 333     Results from last 7 days   Lab Units 04/04/22  0608 04/03/22  0454 04/03/22  0454 04/02/22  0542 04/02/22  0542 04/01/22  0511 04/01/22  0511 03/31/22  0458 03/31/22  0458   SODIUM mmol/L 137  --  134*  --  131*   < > 134*   < > 137   POTASSIUM mmol/L 4 8   < > 4 9   < > 5 0   < > 4 6   < > 4 3   CHLORIDE mmol/L 102   < > 101   < > 100   < > 99*   < > 100   CO2 mmol/L 30   < > 28   < > 25   < > 22   < > 29   BUN mg/dL 20   < > 26*   < > 31*   < > 27*   < > 20   CREATININE mg/dL 0 99   < > 1 07   < > 1 22   < > 1 13   < > 1 09   EGFR ml/min/1 73sq m 94   < > 86   < > 73   < > 80   < > 84   CALCIUM mg/dL 8 4   < > 8 1*   < > 8 4   < > 8 2*   < > 8 0*   AST U/L  --   --   --   --  26  --  22  --  19   ALT U/L  --   --   --   --  15   < > 16   < > 19   ALK PHOS U/L  --   --   --   --  146*   < > 125*   < > 99    < > = values in this interval not displayed  Results from last 7 days   Lab Units 03/30/22  1700 03/30/22  1141   BLOOD CULTURE   --  No Growth After 5 Days  No Growth After 5 Days  GRAM STAIN RESULT  1+ Gram positive cocci in pairs*  1+ Polys*  --    WOUND CULTURE  3+ Growth of Beta Hemolytic Streptococcus Group C*  Few Colonies of Staphylococcus aureus*  Few Colonies of Beta Hemolytic Streptococcus Group G*  Few Colonies of   --              Results from last 7 days   Lab Units 04/01/22  0511   FERRITIN ng/mL 38           Imaging Studies:   03/30/2022 CT abdomen pelvis:  Skin thickening and inflammatory stranding in the lower anterior abdominal wall pannus consistent with cellulitis  No discrete intra-abdominal or pelvic abscess collection  Other Studies:   I have personally reviewed pertinent reports

## 2022-04-04 NOTE — PLAN OF CARE
Problem: MOBILITY - ADULT  Goal: Maintain or return to baseline ADL function  Description: INTERVENTIONS:  -  Assess patient's ability to carry out ADLs; assess patient's baseline for ADL function and identify physical deficits which impact ability to perform ADLs (bathing, care of mouth/teeth, toileting, grooming, dressing, etc )  - Assess/evaluate cause of self-care deficits   - Assess range of motion  - Assess patient's mobility; develop plan if impaired  - Assess patient's need for assistive devices and provide as appropriate  - Encourage maximum independence but intervene and supervise when necessary  - Involve family in performance of ADLs  - Assess for home care needs following discharge   - Consider OT consult to assist with ADL evaluation and planning for discharge  - Provide patient education as appropriate  4/4/2022 0957 by Eric William RN  Outcome: Progressing  4/4/2022 0957 by Eric William RN  Outcome: Progressing  Goal: Maintains/Returns to pre admission functional level  Description: INTERVENTIONS:  - Perform BMAT or MOVE assessment daily    - Set and communicate daily mobility goal to care team and patient/family/caregiver     - Collaborate with rehabilitation services on mobility goals if consulted  - Stand patient 3 times a day  - Ambulate patient 3 times a day  - Out of bed to chair 3 times a day   - Out of bed for meals 3 times a day  - Out of bed for toileting  - Record patient progress and toleration of activity level   4/4/2022 0957 by Eric William RN  Outcome: Progressing  4/4/2022 0957 by Eric William RN  Outcome: Progressing     Problem: Prexisting or High Potential for Compromised Skin Integrity  Goal: Skin integrity is maintained or improved  Description: INTERVENTIONS:  - Identify patients at risk for skin breakdown  - Assess and monitor skin integrity  - Assess and monitor nutrition and hydration status  - Monitor labs   - Assess for incontinence   - Turn and reposition patient  - Assist with mobility/ambulation  - Relieve pressure over bony prominences  - Avoid friction and shearing  - Provide appropriate hygiene as needed including keeping skin clean and dry  - Evaluate need for skin moisturizer/barrier cream  - Collaborate with interdisciplinary team   - Patient/family teaching  - Consider wound care consult   4/4/2022 0957 by Ruslan Castro RN  Outcome: Progressing  4/4/2022 0957 by Ruslan Castro RN  Outcome: Progressing     Problem: Potential for Falls  Goal: Patient will remain free of falls  Description: INTERVENTIONS:  - Educate patient/family on patient safety including physical limitations  - Instruct patient to call for assistance with activity   - Consult OT/PT to assist with strengthening/mobility   - Keep Call bell within reach  - Keep bed low and locked with side rails adjusted as appropriate  - Keep care items and personal belongings within reach  - Initiate and maintain comfort rounds  - Make Fall Risk Sign visible to staff  - Offer Toileting every 2 Hours, in advance of need    4/4/2022 0957 by Ruslan Castro RN  Outcome: Progressing  4/4/2022 0957 by Ruslan Castro RN  Outcome: Progressing     Problem: Nutrition/Hydration-ADULT  Goal: Nutrient/Hydration intake appropriate for improving, restoring or maintaining nutritional needs  Description: Monitor and assess patient's nutrition/hydration status for malnutrition  Collaborate with interdisciplinary team and initiate plan and interventions as ordered  Monitor patient's weight and dietary intake as ordered or per policy  Utilize nutrition screening tool and intervene as necessary  Determine patient's food preferences and provide high-protein, high-caloric foods as appropriate       INTERVENTIONS:  - Monitor oral intake, urinary output, labs, and treatment plans  - Assess nutrition and hydration status and recommend course of action  - Evaluate amount of meals eaten  - Assist patient with eating if necessary   - Allow adequate time for meals  - Recommend/ encourage appropriate diets, oral nutritional supplements, and vitamin/mineral supplements  - Order, calculate, and assess calorie counts as needed  - Recommend, monitor, and adjust tube feedings and TPN/PPN based on assessed needs  - Assess need for intravenous fluids  - Provide specific nutrition/hydration education as appropriate  - Include patient/family/caregiver in decisions related to nutrition  4/4/2022 0957 by Luz Luis RN  Outcome: Progressing  4/4/2022 0957 by Luz Luis RN  Outcome: Progressing     Problem: PAIN - ADULT  Goal: Verbalizes/displays adequate comfort level or baseline comfort level  Description: Interventions:  - Encourage patient to monitor pain and request assistance  - Assess pain using appropriate pain scale  - Administer analgesics based on type and severity of pain and evaluate response  - Implement non-pharmacological measures as appropriate and evaluate response  - Consider cultural and social influences on pain and pain management  - Notify physician/advanced practitioner if interventions unsuccessful or patient reports new pain  4/4/2022 0957 by Luz Luis RN  Outcome: Progressing  4/4/2022 0957 by Luz Luis RN  Outcome: Progressing     Problem: INFECTION - ADULT  Goal: Absence or prevention of progression during hospitalization  Description: INTERVENTIONS:  - Assess and monitor for signs and symptoms of infection  - Monitor lab/diagnostic results  - Monitor all insertion sites, i e  indwelling lines, tubes, and drains  - Monitor endotracheal if appropriate and nasal secretions for changes in amount and color  - [place] appropriate cooling/warming therapies per order  - Administer medications as ordered  - Instruct and encourage patient and family to use good hand hygiene technique  - Identify and instruct in appropriate isolation precautions for identified infection/condition  4/4/2022 0957 by Chele Corral RN  Outcome: Progressing  4/4/2022 0957 by Chele Corral RN  Outcome: Progressing     Problem: DISCHARGE PLANNING  Goal: Discharge to home or other facility with appropriate resources  Description: INTERVENTIONS:  - Identify barriers to discharge w/patient and caregiver  - Arrange for needed discharge resources and transportation as appropriate  - Identify discharge learning needs (meds, wound care, etc )  - Arrange for interpretive services to assist at discharge as needed  - Refer to Case Management Department for coordinating discharge planning if the patient needs post-hospital services based on physician/advanced practitioner order or complex needs related to functional status, cognitive ability, or social support system  4/4/2022 0957 by Chele Corral RN  Outcome: Progressing  4/4/2022 0957 by Chele Corral RN  Outcome: Progressing     Problem: Knowledge Deficit  Goal: Patient/family/caregiver demonstrates understanding of disease process, treatment plan, medications, and discharge instructions  Description: Complete learning assessment and assess knowledge base    Interventions:  - Provide teaching at level of understanding  - Provide teaching via preferred learning methods  4/4/2022 0957 by Chele Corral RN  Outcome: Progressing  4/4/2022 0957 by Chele Corral RN  Outcome: Progressing

## 2022-04-04 NOTE — PLAN OF CARE
Problem: MOBILITY - ADULT  Goal: Maintain or return to baseline ADL function  Description: INTERVENTIONS:  -  Assess patient's ability to carry out ADLs; assess patient's baseline for ADL function and identify physical deficits which impact ability to perform ADLs (bathing, care of mouth/teeth, toileting, grooming, dressing, etc )  - Assess/evaluate cause of self-care deficits   - Assess range of motion  - Assess patient's mobility; develop plan if impaired  - Assess patient's need for assistive devices and provide as appropriate  - Encourage maximum independence but intervene and supervise when necessary  - Involve family in performance of ADLs  - Assess for home care needs following discharge   - Consider OT consult to assist with ADL evaluation and planning for discharge  - Provide patient education as appropriate  Outcome: Progressing  Goal: Maintains/Returns to pre admission functional level  Description: INTERVENTIONS:  - Perform BMAT or MOVE assessment daily    - Set and communicate daily mobility goal to care team and patient/family/caregiver     - Collaborate with rehabilitation services on mobility goals if consulted  - Stand patient 3 times a day  - Ambulate patient 3 times a day  - Out of bed to chair 3 times a day   - Out of bed for meals 3 times a day  - Out of bed for toileting  - Record patient progress and toleration of activity level   Outcome: Progressing     Problem: Prexisting or High Potential for Compromised Skin Integrity  Goal: Skin integrity is maintained or improved  Description: INTERVENTIONS:  - Identify patients at risk for skin breakdown  - Assess and monitor skin integrity  - Assess and monitor nutrition and hydration status  - Monitor labs   - Assess for incontinence   - Turn and reposition patient  - Assist with mobility/ambulation  - Relieve pressure over bony prominences  - Avoid friction and shearing  - Provide appropriate hygiene as needed including keeping skin clean and dry  - Evaluate need for skin moisturizer/barrier cream  - Collaborate with interdisciplinary team   - Patient/family teaching  - Consider wound care consult   Outcome: Progressing     Problem: Potential for Falls  Goal: Patient will remain free of falls  Description: INTERVENTIONS:  - Educate patient/family on patient safety including physical limitations  - Instruct patient to call for assistance with activity   - Consult OT/PT to assist with strengthening/mobility   - Keep Call bell within reach  - Keep bed low and locked with side rails adjusted as appropriate  - Keep care items and personal belongings within reach  - Initiate and maintain comfort rounds  - Make Fall Risk Sign visible to staff  - Offer Toileting every 2 Hours, in advance of need    Outcome: Progressing     Problem: Nutrition/Hydration-ADULT  Goal: Nutrient/Hydration intake appropriate for improving, restoring or maintaining nutritional needs  Description: Monitor and assess patient's nutrition/hydration status for malnutrition  Collaborate with interdisciplinary team and initiate plan and interventions as ordered  Monitor patient's weight and dietary intake as ordered or per policy  Utilize nutrition screening tool and intervene as necessary  Determine patient's food preferences and provide high-protein, high-caloric foods as appropriate       INTERVENTIONS:  - Monitor oral intake, urinary output, labs, and treatment plans  - Assess nutrition and hydration status and recommend course of action  - Evaluate amount of meals eaten  - Assist patient with eating if necessary   - Allow adequate time for meals  - Recommend/ encourage appropriate diets, oral nutritional supplements, and vitamin/mineral supplements  - Order, calculate, and assess calorie counts as needed  - Recommend, monitor, and adjust tube feedings and TPN/PPN based on assessed needs  - Assess need for intravenous fluids  - Provide specific nutrition/hydration education as appropriate  - Include patient/family/caregiver in decisions related to nutrition  Outcome: Progressing     Problem: PAIN - ADULT  Goal: Verbalizes/displays adequate comfort level or baseline comfort level  Description: Interventions:  - Encourage patient to monitor pain and request assistance  - Assess pain using appropriate pain scale  - Administer analgesics based on type and severity of pain and evaluate response  - Implement non-pharmacological measures as appropriate and evaluate response  - Consider cultural and social influences on pain and pain management  - Notify physician/advanced practitioner if interventions unsuccessful or patient reports new pain  Outcome: Progressing     Problem: INFECTION - ADULT  Goal: Absence or prevention of progression during hospitalization  Description: INTERVENTIONS:  - Assess and monitor for signs and symptoms of infection  - Monitor lab/diagnostic results  - Monitor all insertion sites, i e  indwelling lines, tubes, and drains  - Monitor endotracheal if appropriate and nasal secretions for changes in amount and color  - [place] appropriate cooling/warming therapies per order  - Administer medications as ordered  - Instruct and encourage patient and family to use good hand hygiene technique  - Identify and instruct in appropriate isolation precautions for identified infection/condition  Outcome: Progressing     Problem: DISCHARGE PLANNING  Goal: Discharge to home or other facility with appropriate resources  Description: INTERVENTIONS:  - Identify barriers to discharge w/patient and caregiver  - Arrange for needed discharge resources and transportation as appropriate  - Identify discharge learning needs (meds, wound care, etc )  - Arrange for interpretive services to assist at discharge as needed  - Refer to Case Management Department for coordinating discharge planning if the patient needs post-hospital services based on physician/advanced practitioner order or complex needs related to functional status, cognitive ability, or social support system  Outcome: Progressing     Problem: Knowledge Deficit  Goal: Patient/family/caregiver demonstrates understanding of disease process, treatment plan, medications, and discharge instructions  Description: Complete learning assessment and assess knowledge base    Interventions:  - Provide teaching at level of understanding  - Provide teaching via preferred learning methods  Outcome: Progressing

## 2022-04-04 NOTE — ASSESSMENT & PLAN NOTE
Chronic    Patient last estimated body mass index was 59 52 kg/m² on 3/7/2022  Has multiple chronic comorbid conditions       · Manage comorbid conditions   · Consider weight management OP

## 2022-04-04 NOTE — ASSESSMENT & PLAN NOTE
Chronic    Patient presented with low hemoglobin of 9 8 on admission  Baseline Hgb is high 8's - 9's  Has been anemic since 2/2021       Hgb trend: 9 8 > 9 0 > 9 1 > 9 3 > 8 8        · Trend hemoglobin   · Transfuse if Hgb < 7 0   · CBC AM

## 2022-04-04 NOTE — PROGRESS NOTES
[facility] Progress Note   Denia Modi 37 y o  male   MRN: [id number]    Unit/Bed#: [address] 404[address]01 Encounter: [id number]    SUMMARY: 37 y o  male patient admitted for management of lower abdominal cellulitis  Currently on Clindamycin, 600 mg, IV, Q8H for antibiotics  Pain management with Dilaudid 1 mg, IV, Q4H PRN and Toradol 10 mg, PO, Q6H  Cellulitis shows slight improvement but has not resolved  Will consider consulting ID if antibiotics need to be changed in again  Patient currently on 6 L/min of O2 per midflow NC  Continues to saturate in the 80's to low 90's  Home oxygen test done yesterday showed that patients baseline SpO2 at rest was 89%, during exertion is 85%, on O2 at rest is 92% at 4 L/min, and during exertion with O2 is 92% at 6 L/min  Patient is anemic with hemoglobin at 8 8 today  Gets Venofer, 300 mg, IV, daily  Will continue antibiosis  Will continue to monitor respiratory status       F: None   E: No repletion needed  N: Diet Markie/CHO Controlled; Consistent Carbohydrate Diet Level 2 (5 carb servings/75 grams CHO/meal)  D: Enoxaparin (LOVENOX) subcutaneous injection 60 mg, Daily   A: Clindamycin, 300 mg, IV, Q8H (Started on 4/2 @1100)      Disposition: Continue on Med - Surg   Code Status: Level 1 - Full Code  ASSESSMENT/PLAN:     * Cellulitis of multiple sites of trunk  Assessment & Plan    Acute    Patient with a history of multiple sites of cellulitis with last admission in 12/2021  Presented with cellulitis of abdomen and pannus  Patient also has wound with some drainage  On previous admission, patient was started on Ancef, 2 g, IV, Q8H and discharged on Keflex  Wound cultures grew beta-hemolytic strep and Staphylococcus haemolyticus which was susceptible to vancomycin  Was given Vancomycin in the ED  Patient was placed on Vancomycin and Ancef with limited response  Was switched to clindamycin on 4/3  CT AP (3/30): No discrete intra-abdominal or pelvic abscess  Skin thickening and inflammatory stranding in the lower anterior abdominal wall pannus suggestive of cellulitis  Wound Culture (3/30): 1+ Gram positive cocci in pairs, 1+ Polys    · Continue Clindamycin 300 mg, IV, Q8H  · Continue Wound Care  · Continue to monitor expansion of cellulitis  · Abdominal pads to maintain area under pannus dry     Swelling of left lower extremity  Assessment & Plan    Chronic     Patient with a history of hidradenitis suppurativa on bilateral groin and thigh  Patient previously had cyst removed from his left thigh which required anesthesia  He has been following up with wound care weekly  Per patient, he is now to see them every 3 weeks  On PE, his left lower extremity circumference is significantly increased compared to right  States that this is chronic problem and actually looks better than usual  Also has history of lymphedema on left LE  · Doppler ultrasound of left lower extremity to rule out DVT  · Lovenox BID   · Continue to monitor    Hidradenitis suppurativa  Assessment & Plan  Patient has history of hidranitis suppurativa with wounds currently present on left groin and abdominal pannus     Wound care consulted  Wound cultures pending   Daily dressings   Continue antibiotics as noted above  Type 2 diabetes mellitus with diabetic polyneuropathy, without long-term current use of insulin [facility])  Assessment & Plan  Lab Results   Component Value Date    HGBA1C 7 5 (H) 03/30/2022       Recent Labs     03/31/22  0716 03/31/22  1106 03/31/22  1656 03/31/22  2153   POCGLU 121 175* 135 109       Blood Sugar Average: Last 72 hrs:  (P) 123 [id number]     Last A1c 7 0 in 11/2021   Repeat A1c 7 5    Held home medications at this time   Will Keep monitoring glucose   ISS  And monitor for basal and meal time insuline requirement While hospitalized     Microcytic anemia  Assessment & Plan    Chronic    Patient presented with low hemoglobin of 9 8 on admission   Baseline Hgb is high 8's - 9's  Has been anemic since 2/2021  Hgb trend: 9 8 > 9 0 > 9 1 > 9 3 > 8 8     · Iron sucrose (VENOFER) 300 mg in  mL, IV, Daily   · Trend hemoglobin   · Transfuse if Hgb < 7 0   · CBC AM     Nocturnal hypoxia  Assessment & Plan    Chronic    SpO2 on admission was 99% on RA  Patient has history of nocturnal hypoxemia  In 12/2021, patient had overnight O2 evaluation which showed significant hypoxemia with lowest desaturation of 59%  At that time, patient was advised to follow-up with pulmonology for complete sleep study  Patient has been saturating consistently in the low 80's at HS  Currently on 4 L of oxygen per NC  Home O2 Sleep Study (4/3): Baseline SpO2 at rest was 89%, during exertion is 85%, on O2 at rest is 92% at 4 L/min, and during exertion with O2 is 92% at 6 L/min    · Continue oxygen therapy   · Titrate oxygen as needed at HS   · Case management for home oxygen   · Needs polysomnography done OP  · Follow up with pulmonology at discharge          Type 1 non-ST elevation myocardial infarction (NSTEMI) [facility])  Assessment & Plan  Patient has a history of MI with stent placement in 2001  Patient follows with Dr Remigio Hillman outpatient    Patient currently on Coreg 6 25 mg b i d  Essential hypertension  Assessment & Plan    Chronic     BP's on admission were in the 180's/90's  · Continue home medications  · Amlodipine (NORVASC) 10 mg, PO, Daily   · Carvedilol (COREG) 6 25 mg, PO, BID  · Lisinopril (ZESTRIL) 10 mg, PO, Daily   · Monitor BP     Coronary artery disease  Assessment & Plan    Chronic     Patient with a history of cardiac catheterization in 2010 and 2020  Follows with cardiology outpatient  Denies any chest pain and shortness of breath on admission  Patient states not taking ticagrelor (BRILINTA) 90 mg, PO, Q12H       · Continue home medications  · Carvedilol (COREG) 6 25 mg, PO, BID  · Amlodipine (NORVASC) 10 mg, PO, BID  · Aspirin 81 mg, PO, Daily -------------------------------------------------------------------------------------  HPI - 24HR EVENTS    There were no overnight concerning events or episodes of fever  Patient's vital signs were stable throughout the night  No complaints  Continues to saturate in the high 80's throughout the night     ---------------------------------------------------------------------------------------  SUBJECTIVE    Went to round on patient throughout the morning  He was encountered lying in bed resting  Was AAO x3 and in NAD  He mentioned that was feeling ok  States that he still has pain but that it has improved slightly  Review of Systems    Patient denied not fever, chills or concerning events during the night  Had no chest pain, N/V, diarrhea, abdominal pain, or any other related symptoms  Has been tolerating diet  Has been urinating and defecating adequately  No other complaints    ---------------------------------------------------------------------------------------  OBJECTIVE    Vitals     Vitals:    22 0731 22 1533 22 0014   BP: 151/73 147/74 147/74 148/75   Pulse: 62 64 61 66   Resp:  19 18 20   Temp: 98 °F (36 7 °C) 97 7 °F (36 5 °C) 97 9 °F (36 6 °C) (!) 97 3 °F (36 3 °C)   TempSrc: Oral      SpO2: 90% 90% (!) 87% (!) 80%     Temp (24hrs), Av 6 °F (36 4 °C), Min:97 3 °F (36 3 °C), Max:97 9 °F (36 6 °C)  Current: Temperature: (!) 97 3 °F (36 3 °C)          Respiratory:    SpO2: (!) 80 %  SpO2 Activity: At Rest,  O2 Device: Mid flow nasal cannula    Nasal Cannula O2 Flow Rate (L/min): 6 L/min    PHYSICAL EXAM:    Constitutional:       Appearance: He is obese  He is not diaphoretic  HENT:      Head: Normocephalic and atraumatic  Right Ear: External ear normal       Left Ear: External ear normal       Nose: Nose normal    Eyes:      General: No scleral icterus       Conjunctiva/sclera: Conjunctivae normal    Cardiovascular:      Rate and Rhythm: Normal rate and regular rhythm  Pulses: Normal pulses  Heart sounds: Normal heart sounds  Pulmonary:      Effort: Pulmonary effort is normal       Breath sounds: Normal breath sounds  Abdominal:      Palpations: Abdomen is soft  Tenderness: There is abdominal tenderness  Comments: Large habitus  Cellulitis below the umbilicus diffusely throughout the lower abdomen - slowly improving - less erythematous   Skin:     General: Skin is warm and dry  Comments: Cellulitis of the lower posterior left leg - slowly improving     Neurological:      Mental Status: He is oriented to person, place, and time  Psychiatric:         Mood and Affect: Mood normal          Behavior: Behavior normal          Thought Content:  Thought content normal          Judgment: Judgment normal      Laboratory and Diagnostics:    Results from last 7 days   Lab Units 04/03/22  0454 04/02/22  0542 04/01/22  0511 03/31/22  0458 03/30/22  1141   WBC Thousand/uL 8 03 7 06 6 65 7 35 8 23   HEMOGLOBIN g/dL 8 8* 9 3* 9 1* 9 0* 9 8*   HEMATOCRIT % 32 5* 34 1* 31 5* 32 6* 35 1*   PLATELETS Thousands/uL 375 355 333 295 342   NEUTROS PCT %  --  68  --  68 80*   MONOS PCT %  --  10  --  11 9     Results from last 7 days   Lab Units 04/04/22  0608 04/03/22  0454 04/02/22  0542 04/01/22  0511 03/31/22  0458 03/30/22  1141   SODIUM mmol/L 137 134* 131* 134* 137 138   POTASSIUM mmol/L 4 8 4 9 5 0 4 6 4 3 4 1   CHLORIDE mmol/L 102 101 100 99* 100 102   CO2 mmol/L 30 28 25 22 29 32   ANION GAP mmol/L 5 5 6 13 8 4   BUN mg/dL 20 26* 31* 27* 20 16   CREATININE mg/dL 0 99 1 07 1 22 1 13 1 09 0 96   CALCIUM mg/dL 8 4 8 1* 8 4 8 2* 8 0* 8 1*   GLUCOSE RANDOM mg/dL 120 136 113 123 115 126   ALT U/L  --   --  15 16 19 19   AST U/L  --   --  26 22 19 25   ALK PHOS U/L  --   --  146* 125* 99 98   ALBUMIN g/dL  --   --  2 3* 2 3* 2 2* 2 4*   TOTAL BILIRUBIN mg/dL  --   --  0 35 0 28 0 28 0 23     Results from last 7 days   Lab Units 03/31/22  0458   MAGNESIUM mg/dL 1 9      Results from last 7 days   Lab Units 03/30/22  1141   INR  1 02   PTT seconds 29          Results from last 7 days   Lab Units 03/30/22  1141   LACTIC ACID mmol/L 0 5     MICROBIOLOGY RESULTS:    Results from last 7 days   Lab Units 03/30/22  1700 03/30/22  1141   BLOOD CULTURE   --  No Growth After 4 Days  No Growth After 4 Days  GRAM STAIN RESULT  1+ Gram positive cocci in pairs*  1+ Polys*  --    WOUND CULTURE  3+ Growth of Beta Hemolytic Streptococcus Group C*  Few Colonies of Staphylococcus aureus*  Few Colonies of Beta Hemolytic Streptococcus Group G*  Few Colonies of   --      Imaging: I have personally reviewed pertinent reports  VAS lower limb venous duplex study, unilateral/limited   Final Result      CT abdomen pelvis w contrast   Final Result      No discrete intra-abdominal or pelvic abscess  Skin thickening and inflammatory stranding in the lower anterior abdominal wall pannus suggestive of cellulitis  XR chest 1 view portable   Final Result      No acute cardiopulmonary disease  Intake and Output    I/O       04/02 0701  04/03 0700 04/03 0701  04/04 0700    P  O  970     I V  10     Total Intake 980     Net +980           Unmeasured Urine Occurrence 2 x 3 x          Height and Weights           There is no height or weight on file to calculate BMI  Weight (last 2 days)     None      Nutrition         Diet Orders   (From admission, onward)             Start     Ordered    03/30/22 1716  Diet Markie/CHO Controlled; Consistent Carbohydrate Diet Level 2 (5 carb servings/75 grams CHO/meal)  Diet effective now        References:    Nutrtion Support Algorithm Enteral vs  Parenteral   Question Answer Comment   Diet Type Markie/CHO Controlled    Markie/CHO Controlled Consistent Carbohydrate Diet Level 2 (5 carb servings/75 grams CHO/meal)    RD to adjust diet per protocol?  Yes        03/30/22 1715    03/30/22 1627  Room Service  Once        Question:  Type of Service  Answer: Room Service-Appropriate    03/30/22 1626            Active Medications    Scheduled Meds:    Current Facility-Administered Medications   Medication Dose Route Frequency Provider Last Rate    acetaminophen  650 mg Oral Q6H PRN Goyo Huerta MD      acetaminophen  650 mg Oral Once Goyo Huerta MD      albuterol  2 puff Inhalation Q6H PRN Marjo Olp, DO      amLODIPine  10 mg Oral Daily [facility], [facility]      aspirin  81 mg Oral Daily Mar [facility], [facility]      atorvastatin  80 mg Oral Daily With [place], [place]      carvedilol  6 25 mg Oral BID With Meals Marjo Olp, DO      clindamycin  600 mg Intravenous Garry Gallegos  mg (04/04/22 0254)    enoxaparin  60 mg Subcutaneous Daily Sydni Cagle MD      famotidine  20 mg Oral BID Joce Ortiz MD      HYDROmorphone  1 mg Intravenous Q4H PRN Joce Ortiz MD      insulin lispro  2-12 Units Subcutaneous 4x Daily (AC & HS) Marjo Olp, DO      iron sucrose  300 mg Intravenous Daily Sydni Cagle MD      ketorolac  10 mg Oral Q6H Garry Gallegos MD      lisinopril  10 mg Oral Daily Marjo Olp, DO      nystatin   Topical TID Marjo Olp, DO     PRN Meds:     acetaminophen, 650 mg, Q6H PRN  albuterol, 2 puff, Q6H PRN  HYDROmorphone, 1 mg, Q4H PRN    Invasive Devices Review    Invasive Devices  Report    Peripheral Intravenous Line            Peripheral IV 04/04/22 Left;Ventral (anterior) Forearm <1 day              It was a pleasure to be of service of Audrey Me  Shayla Rodriguez MD, [facility]  [address]      Portions of the record may have been created with voice recognition software  Occasional wrong word or "sound a like" substitutions may have occurred due to the inherent limitations of voice recognition software  Read the chart carefully and recognize, using context, where substitutions have occurred

## 2022-04-04 NOTE — PHYSICAL THERAPY NOTE
PHYSICAL THERAPY EVALUATION/TREATMENT     04/04/22 1201   Note Type   Note type Evaluation   Pain Assessment   Pain Assessment Tool Riojas-Baker FACES   Riojas-Baker FACES Pain Rating 2   Pain Location/Orientation Location: Abdomen   Restrictions/Precautions   Other Precautions O2   Home Living   Type of [address] Two level;1/2 bath on main level;Bed/bath upstairs  (3 ADRIANA)   Home Equipment Walker   Prior Function   Level of [place] Independent with ADLs and functional mobility   Lives With Son   ADL Assistance Independent   Vocational Full time employment  (pt works as a )   General   Additional Pertinent History Pt admitted with cellulitis of the trunk  Cognition   Overall Cognitive Status WFL   Following Commands Follows all commands and directions without difficulty   Subjective   Subjective "I feel fine"   RLE Assessment   RLE Assessment WNL   LLE Assessment   LLE Assessment WNL   Bed Mobility   Supine to Sit 7  Independent   Sit to Supine 7  Independent   Transfers   Sit to Stand 7  Independent   Stand to Sit 7  Independent   Stand pivot 7  Independent   Ambulation/Elevation   Gait Assistance 7  Independent   Assistive Device None  (6L02 when walking)Sp02 89%    Distance 150 feet   Balance   Static Sitting Good   Dynamic Sitting Good   Static Standing Good   Dynamic Standing Good   Activity Tolerance   Activity Tolerance Patient tolerated treatment well   Assessment   Prognosis Good   Problem List Decreased endurance   Assessment Patient seen for Physical Therapy evaluation  Patient admitted with Cellulitis of multiple sites of trunk  Comorbidities affecting patient's physical performance include: DM with neuropathy, obesity, htn  Personal factors affecting patient at time of initial evaluation include: lives in 2 story house and inability to perform current job functions   Prior to admission, patient was independent with functional mobility without assistive device, independent with ADLS and works full time  Please find objective findings from Physical Therapy assessment regarding body systems outlined above with impairments and limitations including decreased endurance and decreased skin integrity  The Barthel Index was used as a functional outcome tool presenting with a score of Barthel Index Score: 100 today indicating no limitations of functional mobility and ADLS  Patient's clinical presentation is currently stable  as seen in patient's presentation of decreased endurance  Pt would benefit from continued Physical Therapy treatment to address deficits as defined above and maximize level of functional mobility  As demonstrated by objective findings, the assigned level of complexity for this evaluation is moderate  The patient's AM-PAC Basic Mobility Inpatient Short Form Raw Score is 24  A Raw score of greater than 16 suggests the patient may benefit from discharge to home     Goals   Patient Goals "rash healing" "go home"   STG Expiration Date 04/11/22   Short Term Goal #1 pt will ambulate on  feet with Sp02 >88%, pt will go up and down 10 steps on RA with Sp02 >88%, pt will be independent in deep breathing exercise   LTG Expiration Date 04/18/22   Long Term Goal #1 Pt will return to work as a    Plan   Treatment/Interventions Endurance training;Patient/family training;Equipment eval/education  (recommend pt ambulate often, use deep breathing, sit upright)   PT Frequency 2-3x/wk   Recommendation   PT Discharge Recommendation No rehabilitation needs   AM-[facility] Basic Mobility Inpatient   Turning in Bed Without Bedrails 4   Lying on Back to Sitting on Edge of Flat Bed 4   Moving Bed to Chair 4   Standing Up From Chair 4   Walk in Room 4   Climb 3-5 Stairs 4   Basic Mobility Inpatient Raw Score 24   Basic Mobility Standardized Score 57 68   Highest Level Of Mobility   JH-HLM Goal 8: Walk 250 feet or more   JH-HLM Highest Level of Mobility 8: Walk 250 feet ot more   JH-HLM Goal Achieved Yes   Barthel Index   Feeding 10   Bathing 5   Grooming Score 5   Dressing Score 10   Bladder Score 10   Bowels Score 10   Toilet Use Score 10   Transfers (Bed/Chair) Score 15   Mobility (Level Surface) Score 15   Stairs Score 10   Barthel Index Score 100   Additional Treatment Session   Start Time 1150   End Time 1200   Treatment Assessment S:  doing OK"  O:  Pt educated on the negative effects of bedrest and the benefit of sitting upright, frequent ambulation , deep breathing on lung oxygenation  Pt provided with a recliner to sit OOB ad ariela  Pt ambulated 150 feet and up and down 3 steps independently  By the end of activity pt's Sp02 was up to 97% on 6L  RN aware  A:  pt is independent with mobility but requires 02 at rest and to walk per RT home 02 eval   Hopeful that with increased upright posture and activity pt's 02 needs will decrease as pt cannot go to work using 02 P:  encouraged frequent mobility  End of Consult   Patient Position at End of Consult Seated edge of bed; All needs within reach   [facility] License Number  Rylan TALAMANTES [id number]

## 2022-04-05 LAB
ANION GAP SERPL CALCULATED.3IONS-SCNC: 3 MMOL/L (ref 4–13)
BASE EX.OXY STD BLDV CALC-SCNC: 75 % (ref 60–80)
BASE EXCESS BLDV CALC-SCNC: 5.8 MMOL/L
BUN SERPL-MCNC: 16 MG/DL (ref 5–25)
CALCIUM SERPL-MCNC: 8.6 MG/DL (ref 8.3–10.1)
CHLORIDE SERPL-SCNC: 102 MMOL/L (ref 100–108)
CO2 SERPL-SCNC: 34 MMOL/L (ref 21–32)
CREAT SERPL-MCNC: 1.08 MG/DL (ref 0.6–1.3)
ERYTHROCYTE [DISTWIDTH] IN BLOOD BY AUTOMATED COUNT: 17.4 % (ref 11.6–15.1)
GFR SERPL CREATININE-BSD FRML MDRD: 85 ML/MIN/1.73SQ M
GLUCOSE SERPL-MCNC: 110 MG/DL (ref 65–140)
GLUCOSE SERPL-MCNC: 114 MG/DL (ref 65–140)
GLUCOSE SERPL-MCNC: 120 MG/DL (ref 65–140)
GLUCOSE SERPL-MCNC: 125 MG/DL (ref 65–140)
GLUCOSE SERPL-MCNC: 160 MG/DL (ref 65–140)
HCO3 BLDV-SCNC: 34.3 MMOL/L (ref 24–30)
HCT VFR BLD AUTO: 35.6 % (ref 36.5–49.3)
HGB BLD-MCNC: 9.7 G/DL (ref 12–17)
MCH RBC QN AUTO: 22.4 PG (ref 26.8–34.3)
MCHC RBC AUTO-ENTMCNC: 27.2 G/DL (ref 31.4–37.4)
MCV RBC AUTO: 82 FL (ref 82–98)
O2 CT BLDV-SCNC: 13.1 ML/DL
PCO2 BLDV: 71.2 MM HG (ref 42–50)
PH BLDV: 7.3 [PH] (ref 7.3–7.4)
PLATELET # BLD AUTO: 437 THOUSANDS/UL (ref 149–390)
PMV BLD AUTO: 9.1 FL (ref 8.9–12.7)
PO2 BLDV: 45.3 MM HG (ref 35–45)
POTASSIUM SERPL-SCNC: 5 MMOL/L (ref 3.5–5.3)
RBC # BLD AUTO: 4.34 MILLION/UL (ref 3.88–5.62)
SODIUM SERPL-SCNC: 139 MMOL/L (ref 136–145)
WBC # BLD AUTO: 8.69 THOUSAND/UL (ref 4.31–10.16)

## 2022-04-05 PROCEDURE — 85027 COMPLETE CBC AUTOMATED: CPT

## 2022-04-05 PROCEDURE — 82805 BLOOD GASES W/O2 SATURATION: CPT | Performed by: STUDENT IN AN ORGANIZED HEALTH CARE EDUCATION/TRAINING PROGRAM

## 2022-04-05 PROCEDURE — 99232 SBSQ HOSP IP/OBS MODERATE 35: CPT | Performed by: INTERNAL MEDICINE

## 2022-04-05 PROCEDURE — 99231 SBSQ HOSP IP/OBS SF/LOW 25: CPT | Performed by: STUDENT IN AN ORGANIZED HEALTH CARE EDUCATION/TRAINING PROGRAM

## 2022-04-05 PROCEDURE — 82948 REAGENT STRIP/BLOOD GLUCOSE: CPT

## 2022-04-05 PROCEDURE — 97165 OT EVAL LOW COMPLEX 30 MIN: CPT

## 2022-04-05 PROCEDURE — 80048 BASIC METABOLIC PNL TOTAL CA: CPT

## 2022-04-05 RX ADMIN — OXYCODONE HYDROCHLORIDE AND ACETAMINOPHEN 1 TABLET: 5; 325 TABLET ORAL at 05:16

## 2022-04-05 RX ADMIN — FAMOTIDINE 20 MG: 20 TABLET, FILM COATED ORAL at 16:00

## 2022-04-05 RX ADMIN — NYSTATIN: 100000 POWDER TOPICAL at 08:34

## 2022-04-05 RX ADMIN — LISINOPRIL 10 MG: 10 TABLET ORAL at 08:33

## 2022-04-05 RX ADMIN — HYDROMORPHONE HYDROCHLORIDE 1 MG: 1 INJECTION, SOLUTION INTRAMUSCULAR; INTRAVENOUS; SUBCUTANEOUS at 13:17

## 2022-04-05 RX ADMIN — ATORVASTATIN CALCIUM 80 MG: 80 TABLET, FILM COATED ORAL at 16:00

## 2022-04-05 RX ADMIN — HYDROMORPHONE HYDROCHLORIDE 1 MG: 1 INJECTION, SOLUTION INTRAMUSCULAR; INTRAVENOUS; SUBCUTANEOUS at 02:30

## 2022-04-05 RX ADMIN — OXYCODONE HYDROCHLORIDE AND ACETAMINOPHEN 1 TABLET: 5; 325 TABLET ORAL at 12:14

## 2022-04-05 RX ADMIN — HYDROMORPHONE HYDROCHLORIDE 1 MG: 1 INJECTION, SOLUTION INTRAMUSCULAR; INTRAVENOUS; SUBCUTANEOUS at 19:32

## 2022-04-05 RX ADMIN — CEFAZOLIN SODIUM 2000 MG: 2 SOLUTION INTRAVENOUS at 05:16

## 2022-04-05 RX ADMIN — OXYCODONE HYDROCHLORIDE AND ACETAMINOPHEN 1 TABLET: 5; 325 TABLET ORAL at 16:00

## 2022-04-05 RX ADMIN — AMLODIPINE BESYLATE 10 MG: 10 TABLET ORAL at 08:33

## 2022-04-05 RX ADMIN — CARVEDILOL 6.25 MG: 6.25 TABLET, FILM COATED ORAL at 07:30

## 2022-04-05 RX ADMIN — Medication 6 MG: at 22:07

## 2022-04-05 RX ADMIN — CEFAZOLIN SODIUM 2000 MG: 2 SOLUTION INTRAVENOUS at 22:08

## 2022-04-05 RX ADMIN — CARVEDILOL 6.25 MG: 6.25 TABLET, FILM COATED ORAL at 16:00

## 2022-04-05 RX ADMIN — INSULIN LISPRO 2 UNITS: 100 INJECTION, SOLUTION INTRAVENOUS; SUBCUTANEOUS at 12:01

## 2022-04-05 RX ADMIN — HYDROMORPHONE HYDROCHLORIDE 1 MG: 1 INJECTION, SOLUTION INTRAMUSCULAR; INTRAVENOUS; SUBCUTANEOUS at 08:32

## 2022-04-05 RX ADMIN — CEFAZOLIN SODIUM 2000 MG: 2 SOLUTION INTRAVENOUS at 17:47

## 2022-04-05 RX ADMIN — ASPIRIN 81 MG: 81 TABLET, COATED ORAL at 08:33

## 2022-04-05 RX ADMIN — ENOXAPARIN SODIUM 60 MG: 60 INJECTION SUBCUTANEOUS at 08:32

## 2022-04-05 RX ADMIN — FAMOTIDINE 20 MG: 20 TABLET, FILM COATED ORAL at 08:33

## 2022-04-05 RX ADMIN — OXYCODONE HYDROCHLORIDE AND ACETAMINOPHEN 1 TABLET: 5; 325 TABLET ORAL at 00:24

## 2022-04-05 RX ADMIN — OXYCODONE HYDROCHLORIDE AND ACETAMINOPHEN 1 TABLET: 5; 325 TABLET ORAL at 23:59

## 2022-04-05 RX ADMIN — ZINC SULFATE 220 MG (50 MG) CAPSULE 220 MG: CAPSULE at 08:33

## 2022-04-05 RX ADMIN — CEFAZOLIN SODIUM 2000 MG: 2 SOLUTION INTRAVENOUS at 11:00

## 2022-04-05 NOTE — PROGRESS NOTES
Progress Note - Infectious Disease   Denia Modi 37 y o  male MRN: [id number]  Unit/Bed#: [address] [address] Encounter: [id number]      Impression/Plan:  1   Abdominal wall cellulitis/panniculitis  Clinically appearing streptococcal   2022 wound culture from the abdominal wall has grown 3+ group C Streptococcus, few colonies of MSSA and few colonies of group G Streptococcus  2022 CT abdomen and pelvis consistent with panniculitis, no intra-abdominal abscess  -continue cefazolin high-dose 2 g IV every 6 hours  -monitor serial exam over next 48 hours   -Once significantly improved, can transition to high-dose Keflex 1 g every 6 hours to complete a total 2 week antibiotic course from high dose cefazolin start 22 through 22     2  Groin intertrigo and chronic left groin thigh wound  Possible portal of entry  -local topical and wound care per wound care RN  -serial exam     3   History of left groin hidradenitis   Status post excision 2021 with chronic wound      4   Morbid obesity  BMI 59 2   Risk factor for infection, poor wound healing   -lifestyle modification and weight loss management discussed and patient receptive     5   Poorly controlled DM   With hyperglycemia   Recent A1c 2022 7  5   Risk factor for infection   -blood glucose management per primary care team     Antibiotics:  High dose Cefazolin D1-2    Subjective:  Patient has no fever, chills, sweats overnight; no nausea, vomiting, diarrhea; no cough, shortness of breath; no increased abdominal pain  No new symptoms      Objective:  Vitals:  Temp:  [97 3 °F (36 3 °C)-98 °F (36 7 °C)] 98 °F (36 7 °C)  HR:  [56-65] 56  Resp:  [18] 18  BP: (130-177)/(59-87) 136/81  SpO2:  [85 %-92 %] 92 %  Temp (24hrs), Av 6 °F (36 4 °C), Min:97 3 °F (36 3 °C), Max:98 °F (36 7 °C)  Current: Temperature: 98 °F (36 7 °C)    Physical Exam:   General Appearance:  37year old morbidly obese male resting in bed, alert, interactive, nontoxic, no acute distress  Throat: Oropharynx moist without lesions  Lungs:   Clear to auscultation bilaterally; no wheezes, rhonchi or rales; respirations unlabored   Heart:  RRR; decreased tones   Abdomen:   Soft, protuberant pannus with slowly disipating pink hue within marked border, positive bowel sounds  Extremities: No clubbing, cyanosis, + LE venous staining edema   : No lewis, no SPT   Skin: No new rashes or lesions  IV site nontender  Less maceration of groin folds  Labs, Imaging, & Other studies:   All pertinent labs and imaging studies were personally reviewed  Results from last 7 days   Lab Units 04/05/22  0542 04/03/22  0454 04/02/22  0542   WBC Thousand/uL 8 69 8 03 7 06   HEMOGLOBIN g/dL 9 7* 8 8* 9 3*   PLATELETS Thousands/uL 437* 375 355     Results from last 7 days   Lab Units 04/05/22  0542 04/04/22  7884 04/04/22  4344 04/03/22  0454 04/03/22  0454 04/02/22  0542 04/02/22  0542 04/01/22  0511 04/01/22  0511 03/31/22  0458 03/31/22  0458   SODIUM mmol/L 139  --  137  --  134*   < > 131*   < > 134*   < > 137   POTASSIUM mmol/L 5 0   < > 4 8   < > 4 9   < > 5 0   < > 4 6   < > 4 3   CHLORIDE mmol/L 102   < > 102   < > 101   < > 100   < > 99*   < > 100   CO2 mmol/L 34*   < > 30   < > 28   < > 25   < > 22   < > 29   BUN mg/dL 16   < > 20   < > 26*   < > 31*   < > 27*   < > 20   CREATININE mg/dL 1 08   < > 0 99   < > 1 07   < > 1 22   < > 1 13   < > 1 09   EGFR ml/min/1 73sq m 85   < > 94   < > 86   < > 73   < > 80   < > 84   CALCIUM mg/dL 8 6   < > 8 4   < > 8 1*   < > 8 4   < > 8 2*   < > 8 0*   AST U/L  --   --   --   --   --   --  26  --  22  --  19   ALT U/L  --   --   --   --   --   --  15   < > 16   < > 19   ALK PHOS U/L  --   --   --   --   --   --  146*   < > 125*   < > 99    < > = values in this interval not displayed  Results from last 7 days   Lab Units 03/30/22  1700 03/30/22  1141   BLOOD CULTURE   --  No Growth After 5 Days  No Growth After 5 Days     GRAM STAIN RESULT  1+ Gram positive cocci in pairs*  1+ Polys*  --    WOUND CULTURE  3+ Growth of Beta Hemolytic Streptococcus Group C*  Few Colonies of Staphylococcus aureus*  Few Colonies of Beta Hemolytic Streptococcus Group G*  Few Colonies of   --              Results from last 7 days   Lab Units 04/01/22  0511   FERRITIN ng/mL 38

## 2022-04-05 NOTE — UTILIZATION REVIEW
Continued Stay Review    Date: 4/5/22                          Current Patient Class: inpatient  Current Level of Care: med surg  HPI:41 y o  male initially admitted on 3/30/22     Assessment/Plan:   High dose IVABT in progress for abd wall cellulitis/panniculitis  IVABT switched to Ancef per ID on 4/3  Cellulitis below the umbilicus diffusely throughout the lower abdomen & lower posterior L leg, persistently erythematous & tender  Using IV Dilaudid for pain control  Wound care & dressing changes continued  Vital Signs:   04/05/22 15:07:09 -- 56 -- 136/81 99 92 % -- -- -- --   04/05/22 15:06:04 -- -- -- 132/82 99 -- -- -- -- --   04/05/22 08:28:22 98 °F (36 7 °C) 65 18 177/87 Abnormal  117 -- -- -- -- Sitting     Pertinent Labs/Diagnostic Results:   Results from last 7 days   Lab Units 03/30/22  1122   SARS-COV-2  Negative     Results from last 7 days   Lab Units 04/05/22  0542 04/03/22  0454 04/02/22  0542 04/01/22  0511 04/01/22  0511 03/31/22  0458 03/31/22  0458 03/30/22  1141 03/30/22  1141   WBC Thousand/uL 8 69 8 03 7 06   < > 6 65   < > 7 35   < > 8 23   HEMOGLOBIN g/dL 9 7* 8 8* 9 3*  --  9 1*  --  9 0*   < > 9 8*   HEMATOCRIT % 35 6* 32 5* 34 1*  --  31 5*  --  32 6*   < > 35 1*   PLATELETS Thousands/uL 437* 375 355   < > 333   < > 295   < > 342   NEUTROS ABS Thousands/µL  --   --  4 86  --   --   --  4 96  --  6 55    < > = values in this interval not displayed       Results from last 7 days   Lab Units 04/05/22  0542 04/04/22  1642 04/03/22  0454 04/02/22  0542 04/01/22  0511 03/31/22  0458 03/31/22  0458   SODIUM mmol/L 139 137 134* 131* 134*   < > 137   POTASSIUM mmol/L 5 0 4 8 4 9 5 0 4 6   < > 4 3   CHLORIDE mmol/L 102 102 101 100 99*   < > 100   CO2 mmol/L 34* 30 28 25 22   < > 29   ANION GAP mmol/L 3* 5 5 6 13   < > 8   BUN mg/dL 16 20 26* 31* 27*   < > 20   CREATININE mg/dL 1 08 0 99 1 07 1 22 1 13   < > 1 09   EGFR ml/min/1 73sq m 85 94 86 73 80   < > 84   CALCIUM mg/dL 8 6 8 4 8 1* 8 4 8 2* < > 8 0*   MAGNESIUM mg/dL  --   --   --   --   --   --  1 9    < > = values in this interval not displayed  Results from last 7 days   Lab Units 04/02/22  0542 04/01/22  0511 03/31/22  0458 03/30/22  1141   AST U/L 26 22 19 25   ALT U/L 15 16 19 19   ALK PHOS U/L 146* 125* 99 98   TOTAL PROTEIN g/dL 7 9 7 9 7 5 8 2   ALBUMIN g/dL 2 3* 2 3* 2 2* 2 4*   TOTAL BILIRUBIN mg/dL 0 35 0 28 0 28 0 23     Results from last 7 days   Lab Units 04/05/22  1109 04/05/22  0707 04/04/22 2017 04/04/22  1657 04/04/22  1125 04/04/22  0712 04/03/22 2011 04/03/22  1558 04/03/22  1107 04/03/22  0719 04/02/22  2204 04/02/22  1947   POC GLUCOSE mg/dl 160* 114 189* 120 158* 124 130 125 167* 146* 141* 120     Results from last 7 days   Lab Units 04/05/22  0542 04/04/22  0608 04/03/22  0454 04/02/22  0542 04/01/22  0511 03/31/22  0458 03/30/22  1141   GLUCOSE RANDOM mg/dL 110 120 136 113 123 115 126     Results from last 7 days   Lab Units 03/30/22  1141   HEMOGLOBIN A1C % 7 5*   EAG mg/dl 169     Results from last 7 days   Lab Units 04/05/22  1005   PH MAGUE  7 301   PCO2 MAGUE mm Hg 71 2*   PO2 MAGUE mm Hg 45 3*   HCO3 MAGUE mmol/L 34 3*   BASE EXC MAGUE mmol/L 5 8   O2 CONTENT MAGUE ml/dL 13 1   O2 HGB, VENOUS % 75 0     Results from last 7 days   Lab Units 03/30/22  1141   PROTIME seconds 13 2   INR  1 02   PTT seconds 29     Results from last 7 days   Lab Units 03/30/22  1141   TSH 3RD GENERATON uIU/mL 2 784     Results from last 7 days   Lab Units 03/30/22  1141   LACTIC ACID mmol/L 0 5     Results from last 7 days   Lab Units 04/01/22  0511   FERRITIN ng/mL 38     Results from last 7 days   Lab Units 03/30/22  1122   INFLUENZA A PCR  Negative   INFLUENZA B PCR  Negative   RSV PCR  Negative     Results from last 7 days   Lab Units 03/30/22  1700 03/30/22  1141   BLOOD CULTURE   --  No Growth After 5 Days  No Growth After 5 Days     GRAM STAIN RESULT  1+ Gram positive cocci in pairs*  1+ Polys*  --    WOUND CULTURE  3+ Growth of Beta Hemolytic Streptococcus Group C*  Few Colonies of Staphylococcus aureus*  Few Colonies of Beta Hemolytic Streptococcus Group G*  Few Colonies of   --      Medications:   amLODIPine, 10 mg, Oral, Daily  aspirin, 81 mg, Oral, Daily  atorvastatin, 80 mg, Oral, Daily With Dinner  carvedilol, 6 25 mg, Oral, BID With Meals  cefazolin, 2,000 mg, Intravenous, Q6H  enoxaparin, 60 mg, Subcutaneous, Daily  famotidine, 20 mg, Oral, BID  insulin lispro, 2-12 Units, Subcutaneous, 4x Daily (AC & HS)  lisinopril, 10 mg, Oral, Daily  melatonin, 6 mg, Oral, HS  nystatin, , Topical, TID  oxyCODONE-acetaminophen, 1 tablet, Oral, Q6H SHAZIA  zinc sulfate, 220 mg, Oral, Daily    PRN Meds:  acetaminophen, 650 mg, Oral, Q6H PRN  albuterol, 2 puff, Inhalation, Q6H PRN  HYDROmorphone, 1 mg, Intravenous, Q4H PRN-used x9/24hrs    Discharge Plan: d    Network Utilization Review Department  ATTENTION: Please call with any questions or concerns to [phone] and carefully listen to the prompts so that you are directed to the right person  All voicemails are confidential   Martha Levi all requests for admission clinical reviews, approved or denied determinations and any other requests to dedicated fax number below belonging to the campus where the patient is receiving treatment   List of dedicated fax numbers for the Facilities:  [address] [place] [address] DENIALS (Administrative/Medical Necessity) [phone]   [address] [place] [address] (Maternity/NICU/Pediatrics) 261 [facility],[address] Floor [address]  [address] [place] [address] [address] [place] [address] [phone] [phone]

## 2022-04-05 NOTE — PLAN OF CARE
Problem: MOBILITY - ADULT  Goal: Maintain or return to baseline ADL function  Description: INTERVENTIONS:  -  Assess patient's ability to carry out ADLs; assess patient's baseline for ADL function and identify physical deficits which impact ability to perform ADLs (bathing, care of mouth/teeth, toileting, grooming, dressing, etc )  - Assess/evaluate cause of self-care deficits   - Assess range of motion  - Assess patient's mobility; develop plan if impaired  - Assess patient's need for assistive devices and provide as appropriate  - Encourage maximum independence but intervene and supervise when necessary  - Involve family in performance of ADLs  - Assess for home care needs following discharge   - Consider OT consult to assist with ADL evaluation and planning for discharge  - Provide patient education as appropriate  Outcome: Progressing  Goal: Maintains/Returns to pre admission functional level  Description: INTERVENTIONS:  - Perform BMAT or MOVE assessment daily    - Set and communicate daily mobility goal to care team and patient/family/caregiver     - Collaborate with rehabilitation services on mobility goals if consulted  - Stand patient 3 times a day  - Ambulate patient 3 times a day  - Out of bed to chair 3 times a day   - Out of bed for meals 3 times a day  - Out of bed for toileting  - Record patient progress and toleration of activity level   Outcome: Progressing     Problem: Prexisting or High Potential for Compromised Skin Integrity  Goal: Skin integrity is maintained or improved  Description: INTERVENTIONS:  - Identify patients at risk for skin breakdown  - Assess and monitor skin integrity  - Assess and monitor nutrition and hydration status  - Monitor labs   - Assess for incontinence   - Turn and reposition patient  - Assist with mobility/ambulation  - Relieve pressure over bony prominences  - Avoid friction and shearing  - Provide appropriate hygiene as needed including keeping skin clean and dry  - Evaluate need for skin moisturizer/barrier cream  - Collaborate with interdisciplinary team   - Patient/family teaching  - Consider wound care consult   Outcome: Progressing     Problem: Potential for Falls  Goal: Patient will remain free of falls  Description: INTERVENTIONS:  - Educate patient/family on patient safety including physical limitations  - Instruct patient to call for assistance with activity   - Consult OT/PT to assist with strengthening/mobility   - Keep Call bell within reach  - Keep bed low and locked with side rails adjusted as appropriate  - Keep care items and personal belongings within reach  - Initiate and maintain comfort rounds  - Make Fall Risk Sign visible to staff  - Offer Toileting every 2 Hours, in advance of need    Outcome: Progressing     Problem: PAIN - ADULT  Goal: Verbalizes/displays adequate comfort level or baseline comfort level  Description: Interventions:  - Encourage patient to monitor pain and request assistance  - Assess pain using appropriate pain scale  - Administer analgesics based on type and severity of pain and evaluate response  - Implement non-pharmacological measures as appropriate and evaluate response  - Consider cultural and social influences on pain and pain management  - Notify physician/advanced practitioner if interventions unsuccessful or patient reports new pain  Outcome: Progressing     Problem: INFECTION - ADULT  Goal: Absence or prevention of progression during hospitalization  Description: INTERVENTIONS:  - Assess and monitor for signs and symptoms of infection  - Monitor lab/diagnostic results  - Monitor all insertion sites, i e  indwelling lines, tubes, and drains  - Monitor endotracheal if appropriate and nasal secretions for changes in amount and color  - [place] appropriate cooling/warming therapies per order  - Administer medications as ordered  - Instruct and encourage patient and family to use good hand hygiene technique  - Identify and instruct in appropriate isolation precautions for identified infection/condition  Outcome: Progressing     Problem: DISCHARGE PLANNING  Goal: Discharge to home or other facility with appropriate resources  Description: INTERVENTIONS:  - Identify barriers to discharge w/patient and caregiver  - Arrange for needed discharge resources and transportation as appropriate  - Identify discharge learning needs (meds, wound care, etc )  - Arrange for interpretive services to assist at discharge as needed  - Refer to Case Management Department for coordinating discharge planning if the patient needs post-hospital services based on physician/advanced practitioner order or complex needs related to functional status, cognitive ability, or social support system  Outcome: Progressing     Problem: Knowledge Deficit  Goal: Patient/family/caregiver demonstrates understanding of disease process, treatment plan, medications, and discharge instructions  Description: Complete learning assessment and assess knowledge base    Interventions:  - Provide teaching at level of understanding  - Provide teaching via preferred learning methods  Outcome: Progressing

## 2022-04-05 NOTE — CASE MANAGEMENT
Case Management Progress Note    Patient name Bette Guillaume  Location [address] 404[address]4 [facility]-* MRN [id number]  : 1980 Date 2022       LOS (days): 6  Geometric Mean LOS (GMLOS) (days):   Days to [address]:        OBJECTIVE:        Current admission status: Inpatient  Preferred Pharmacy:   [facility]  [address]  Phone: [phone] Fax: [phone]    Primary Care Provider: Michelle Menjivar MD    Primary Insurance: [facility]  Secondary Insurance: [id number]    PROGRESS NOTE:    Weekly Care Management Length of Stay Review     Current LOS: 6    Most Recent Labs:     Lab Results   Component Value Date/Time    WBC 8 69 2022 05:42 AM    HGB 9 7 (L) 2022 05:42 AM    HCT 35 6 (L) 2022 05:42 AM     (H) 2022 05:42 AM    SODIUM 139 2022 05:42 AM    K 5 0 2022 05:42 AM     2022 05:42 AM    CO2 34 (H) 2022 05:42 AM    BUN 16 2022 05:42 AM    CREATININE 1 08 2022 05:42 AM    GLUC 110 2022 05:42 AM       Most Recent Vitals:   Vitals:    22 0828   BP: (!) 177/87   Pulse: 65   Resp: 18   Temp: 98 °F (36 7 °C)   SpO2:         Brief Care Summary: Patient admitted for treatment of cellulitis/mult  Abscesses of LE/groin area and L groin mass  Patient on IVABX  Wound cultures pending  Pt has been on 4-5L for hypoxia  May have OHS and will need further OP workup       Need for Continued Stay: IVABX, Wound Care, Home O2 study    Intended Discharge Disposition: Home/Self Care    Expected Discharge Date: Tomorrow     Current Identified Barriers to Discharge: Needs Home O2 study    Discharge Goals/Interventions: Coordinate follow-up with Envia [facility]

## 2022-04-05 NOTE — PROGRESS NOTES
[facility] 26 Progress Note   Dony Baeza 37 y o  male   MRN: [id number]    Unit/Bed#: [address] [place] [address] Encounter: [id number]    SUMMARY: 41 y o  male patient admitted for management of lower abdominal cellulitis  Changed from Clindamycin, 600 mg, IV, Q8H to Cefazolin 2g, IV, Q6H for antibiotics per ID  Will transition to Asif once discharge  Pain management with Dilaudid 1 mg, IV, Q4H PRN and Percocet 5-325 mg, PO, Q6H [address]  Cellulitis shows slight improvement but has not resolved  Pain controlled with Dilaudid 1 mg, Patient currently on 5 L/min of O2 per NC  Continues to saturate in the 80's to low 90's  Will consult RT for possible CPAP or BIPAP use at HS due to concerns of apnea at HS  BP elevated this morning at 177/87 mmHg  Patient is anemic with hemoglobin at 9 7 today  Hgb trending upward from 8 8 yesterday  Gets Venofer, 300 mg, IV, daily  Will continue antibiosis per ID  Pain management per primary care team  Will continue to monitor respiratory status  Repeat BP manually  VBG placed due to elevated CO2 on CMP  FOBT and UA pending       F: None   E: No repletion needed     N: Diet Markie/CHO Controlled; Consistent Carbohydrate Diet Level 2 (5 carb servings/75 grams CHO/meal)  D: Enoxaparin (LOVENOX) subcutaneous injection 60 mg, Daily   A: Cefazolin 2g, IV, Q6H (Started on 4/4/22)     Disposition: Continue on Med - Surg   Code Status: Level 1 - Full Code    ASSESSMENT/PLAN:     * Cellulitis of multiple sites of trunk  Assessment & Plan    Acute    Patient with a history of multiple sites of cellulitis with last admission in 12/2021  Presented with cellulitis of abdomen and pannus  Patient also has wound with some drainage  On previous admission, patient was started on Ancef, 2 g, IV, Q8H and discharged on Keflex  Wound cultures grew beta-hemolytic strep and Staphylococcus haemolyticus which was susceptible to vancomycin  Was given Vancomycin in the ED   Patient was placed on Vancomycin and Ancef with limited response  Was switched to clindamycin on 4/3  CT AP (3/30): No discrete intra-abdominal or pelvic abscess  Skin thickening and inflammatory stranding in the lower anterior abdominal wall pannus suggestive of cellulitis  Wound Culture (3/30): 1+ Gram positive cocci in pairs, 1+ Polys    · Continue Cefazolin 2g, IV, Q6H (Started on 4/4/22)  · Transition to keflex once discharged  · Pain: Dilaudid 1 mg, IV, Q4H, PRN and Percocet 5-325 mg, PO, Q6H [address]  · Continue Wound Care  · Continue to monitor expansion of cellulitis  · Abdominal pads to maintain area under pannus dry     Nocturnal hypoxia  Assessment & Plan    Chronic    SpO2 on admission was 99% on RA  Patient has history of nocturnal hypoxemia  In 12/2021, patient had overnight O2 evaluation which showed significant hypoxemia with lowest desaturation of 59%  At that time, patient was advised to follow-up with pulmonology for complete sleep study  Patient has been saturating consistently in the low 80's at HS  Currently on 4 L of oxygen per NC  Home O2 Sleep Study (4/3): Baseline SpO2 at rest was 89%, during exertion is 85%, on O2 at rest is 92% at 4 L/min, and during exertion with O2 is 92% at 6 L/min    · Continue oxygen therapy   · Titrate oxygen as needed at HS   · Consider RT consult for possible CPAP/BIPAP at HS  · VBG for elevated CO2   · Case management for home oxygen   · Needs polysomnography done OP  · Follow up with pulmonology at discharge    Microcytic anemia  Assessment & Plan    Chronic    Patient presented with low hemoglobin of 9 8 on admission  Baseline Hgb is high 8's - 9's  Has been anemic since 2/2021  Hgb was higher today at 9 7       Hgb trend: 9 8 > 9 0 > 9 1 > 9 3 > 8 8 > 9 7    · Iron sucrose (VENOFER) 300 mg in  mL, IV, Daily   · Trend hemoglobin   · Transfuse if Hgb < 7 0   · CBC AM     Type 2 diabetes mellitus with diabetic polyneuropathy, without long-term current use of insulin [facility])  Assessment & Plan    Chronic, stable    Patient HbA1c in 11/2021 was 7 0  Repeat HbA1c on admission was 7 5  Serum Glucose have been stable, mostly in the mid 100's  · Hold home medications at this time   · Metformin (GLUCOPHAGE) 1000 mg, PO, BID with Meals     · Glimepiride (AMARYL) 4 mg, PO, Daily with breakfast   · ISS   · Monitor serum glucose    · Monitor for basal and meal time insuline requirement IP    Hidradenitis suppurativa  Assessment & Plan    Chronic     Patient has history of hidranitis suppurativa with wounds currently present on left groin and abdominal pannus  · Wound care consulted  · Wound cultures pending   · Daily dressings   · Continue antibiotics as noted above  Swelling of left lower extremity  Assessment & Plan    Chronic     Patient with a history of hidradenitis suppurativa on bilateral groin and thigh  Patient previously had cyst removed from his left thigh which required anesthesia  He has been following up with wound care weekly  Per patient, he is now to see them every 3 weeks  On PE, his left lower extremity circumference is significantly increased compared to right  States that this is chronic problem and actually looks better than usual  Also has history of lymphedema on left LE  VAS LL Venous duplex (3/30): RLL LIMITED: No evidence of thrombus in the common femoral vein  Normal response to augmentation maneuvers  LLL: No evidence of acute or chronic DVT  No evidence of superficial thrombophlebitis noted  Normal response to augmentation maneuvers  Popliteal, posterior tibial and anterior tibial arterial Doppler waveforms are triphasic  · Enoxaparin (LOVENOX) 60 mg, SC, Daily   · Continue to monitor    Morbid obesity with body mass index (BMI) of 50 0 to 59 9 in adult [facility])  Assessment & Plan    Chronic    Patient last estimated body mass index was 59 52 kg/m² on 3/7/2022  Has multiple chronic comorbid conditions       · Manage comorbid conditions · Consider weight management OP     Type 1 non-ST elevation myocardial infarction (NSTEMI) [facility])  Assessment & Plan    Per history    Patient has a history of MI with stent placement in 2001  Patient follows with Dr Raquel Price outpatient  · Continue home medications  · Carvedilol "COREG" 6 25 mg, PO, BID     Essential hypertension  Assessment & Plan    Chronic     BP's on admission were in the 180's/90's  · Continue home medications  · Amlodipine (NORVASC) 10 mg, PO, Daily   · Carvedilol (COREG) 6 25 mg, PO, BID  · Lisinopril (ZESTRIL) 10 mg, PO, Daily   · Monitor BP     Coronary artery disease  Assessment & Plan    Chronic     Patient with a history of cardiac catheterization in 2010 and 2020  Follows with cardiology outpatient  Denies any chest pain and shortness of breath on admission  Patient states not taking ticagrelor (BRILINTA) 90 mg, PO, Q12H  · Continue home medications  · Carvedilol (COREG) 6 25 mg, PO, BID  · Amlodipine (NORVASC) 10 mg, PO, BID  · Aspirin 81 mg, PO, Daily   -------------------------------------------------------------------------------------  HPI - 24HR EVENTS    There were no overnight episodes of fever  Patient's vital signs were stable throughout the night except for SpO2  No complaints  Continues to saturate in the 80's throughout the night  Nurse had concerns of patient having apneic episodes while sleeping    ---------------------------------------------------------------------------------------  SUBJECTIVE    Went to round on patient throughout the morning  He was encountered lying in bed comfortably  Was AAO x3 and in NAD  He mentioned that he was feeling better and slept well  Pain has had some improvement from yesterday  Review of Systems    Patient denied not fever, chills or concerning events during the night  Had no SOB, chest pain, N/V, diarrhea, abdominal pain, or any other related symptoms  Has been tolerating diet   Has been urinating and defecating adequately  No other complaints    ---------------------------------------------------------------------------------------  OBJECTIVE    Vitals     Vitals:    22 1706 22 1706 22 1934 22 2223   BP: 165/87 165/87 130/59 132/60   BP Location:       Pulse: 59 59 58 63   Resp:   18 18   Temp:   (!) 97 3 °F (36 3 °C) 97 6 °F (36 4 °C)   TempSrc:       SpO2: (!) 85% (!) 87% 91% 91%     Temp (24hrs), Av 6 °F (36 4 °C), Min:97 3 °F (36 3 °C), Max:97 8 °F (36 6 °C)  Current: Temperature: 97 6 °F (36 4 °C)    Respiratory:    SpO2: SpO2: 91 %    Nasal Cannula O2 Flow Rate (L/min): 5 L/min    PHYSICAL EXAM:    Constitutional:       Appearance: He is obese  He is not diaphoretic  HENT:      Head: Normocephalic and atraumatic       Right Ear: External ear normal       Left Ear: External ear normal       Nose: Nose normal    Eyes:      General: No scleral icterus      Conjunctiva/sclera: Conjunctivae normal    Cardiovascular:      Rate and Rhythm: Normal rate and regular rhythm       Pulses: Normal pulses       Heart sounds: Normal heart sounds  Pulmonary:      Effort: Pulmonary effort is normal       Breath sounds: Normal breath sounds  Abdominal:      Palpations: Abdomen is soft       Tenderness: There is abdominal tenderness       Comments: Large habitus  Cellulitis below the umbilicus diffusely throughout the lower abdomen - slowly improving - less erythematous   Skin:     General: Skin is warm and dry       Comments: Cellulitis of the lower posterior left leg - slowly improving     Neurological:      Mental Status: He is oriented to person, place, and time  Psychiatric:         Mood and Affect: Mood normal          BehaviorThomes Rear         Thought Content:  Thought content normal          Judgment: Judgment normal    Laboratory and Diagnostics:    Results from last 7 days   Lab Units 22  0542 22  0454 2242 22  0511 22  0458 22  1141   WBC Thousand/uL 8 69 8 03 7 06 6 65 7 35 8 23   HEMOGLOBIN g/dL 9 7* 8 8* 9 3* 9 1* 9 0* 9 8*   HEMATOCRIT % 35 6* 32 5* 34 1* 31 5* 32 6* 35 1*   PLATELETS Thousands/uL 437* 375 355 333 295 342   NEUTROS PCT %  --   --  68  --  68 80*   MONOS PCT %  --   --  10  --  11 9     Results from last 7 days   Lab Units 04/05/22  0542 04/04/22  0608 04/03/22  0454 04/02/22  0542 04/01/22  0511 03/31/22  0458 03/30/22  1141   SODIUM mmol/L 139 137 134* 131* 134* 137 138   POTASSIUM mmol/L 5 0 4 8 4 9 5 0 4 6 4 3 4 1   CHLORIDE mmol/L 102 102 101 100 99* 100 102   CO2 mmol/L 34* 30 28 25 22 29 32   ANION GAP mmol/L 3* 5 5 6 13 8 4   BUN mg/dL 16 20 26* 31* 27* 20 16   CREATININE mg/dL 1 08 0 99 1 07 1 22 1 13 1 09 0 96   CALCIUM mg/dL 8 6 8 4 8 1* 8 4 8 2* 8 0* 8 1*   GLUCOSE RANDOM mg/dL 110 120 136 113 123 115 126   ALT U/L  --   --   --  15 16 19 19   AST U/L  --   --   --  26 22 19 25   ALK PHOS U/L  --   --   --  146* 125* 99 98   ALBUMIN g/dL  --   --   --  2 3* 2 3* 2 2* 2 4*   TOTAL BILIRUBIN mg/dL  --   --   --  0 35 0 28 0 28 0 23     Results from last 7 days   Lab Units 03/31/22  0458   MAGNESIUM mg/dL 1 9      Results from last 7 days   Lab Units 03/30/22  1141   INR  1 02   PTT seconds 29          Results from last 7 days   Lab Units 03/30/22  1141   LACTIC ACID mmol/L 0 5     MICROBIOLOGY RESULTS:    Results from last 7 days   Lab Units 03/30/22  1700 03/30/22  1141   BLOOD CULTURE   --  No Growth After 5 Days  No Growth After 5 Days  GRAM STAIN RESULT  1+ Gram positive cocci in pairs*  1+ Polys*  --    WOUND CULTURE  3+ Growth of Beta Hemolytic Streptococcus Group C*  Few Colonies of Staphylococcus aureus*  Few Colonies of Beta Hemolytic Streptococcus Group G*  Few Colonies of   --      Imaging: I have personally reviewed pertinent reports        VAS lower limb venous duplex study, unilateral/limited   Final Result      CT abdomen pelvis w contrast   Final Result      No discrete intra-abdominal or pelvic abscess  Skin thickening and inflammatory stranding in the lower anterior abdominal wall pannus suggestive of cellulitis  XR chest 1 view portable   Final Result      No acute cardiopulmonary disease  Intake and Output    I/O       04/03 0701 04/04 0700 04/04 0701 04/05 0700 04/05 0701 04/06 0700    P  O   24     I V   5     Total Intake  29     Net  +29            Unmeasured Urine Occurrence 3 x 1 x         Height and Weights         There is no height or weight on file to calculate BMI  Weight (last 2 days)     None        Nutrition         Diet Orders   (From admission, onward)             Start     Ordered    03/30/22 1716  Diet Markie/CHO Controlled; Consistent Carbohydrate Diet Level 2 (5 carb servings/75 grams CHO/meal)  Diet effective now        References:    Nutrtion Support Algorithm Enteral vs  Parenteral   Question Answer Comment   Diet Type Markie/CHO Controlled    Markie/CHO Controlled Consistent Carbohydrate Diet Level 2 (5 carb servings/75 grams CHO/meal)    RD to adjust diet per protocol?  Yes        03/30/22 1715    03/30/22 1627  Room Service  Once        Question:  Type of Service  Answer:  Room Service-Appropriate    03/30/22 1626              Active Medications    Scheduled Meds:    Current Facility-Administered Medications   Medication Dose Route Frequency Provider Last Rate    acetaminophen  650 mg Oral Q6H PRN Ross Slim, DO      albuterol  2 puff Inhalation Q6H PRN Ross Slim, DO      amLODIPine  10 mg Oral Daily Ross Slim, [address]      aspirin  81 mg Oral Daily Ross Slim, [address]      atorvastatin  80 mg Oral Daily [facility], [address]      carvedilol  6 25 mg Oral BID With Meals Ross Slim, DO      cefazolin  2,000 mg Intravenous Q6H Anand Gutierrez PA-C 2,000 mg (04/05/22 0516)    enoxaparin  60 mg Subcutaneous Daily Karrie Etseves MD      famotidine  20 mg Oral BID Diya Romano MD      HYDROmorphone  1 mg Intravenous Q4H PRN Yomi Ayanna Pike MD      insulin lispro  2-12 Units Subcutaneous 4x Daily (AC & HS) [facility] Vicki       lisinopril  10 mg Oral Daily [place], [place]      melatonin  6 mg Oral HS [place]      nystatin   Topical TID [place]      oxyCODONE-acetaminophen  1 tablet Oral Q6H [facility] [place], [place]      zinc sulfate  220 mg Oral Daily Bruno Lee MD     PRN Meds:     acetaminophen, 650 mg, Q6H PRN  albuterol, 2 puff, Q6H PRN  HYDROmorphone, 1 mg, Q4H PRN    Invasive Devices Review    Invasive Devices  Report    Peripheral Intravenous Line            Peripheral IV 04/04/22 Left;Ventral (anterior) Forearm 1 day            It was a pleasure to be of service of Gio Harrington MD, MSMS  [address] [facility] PGY1 - [facility]      Portions of the record may have been created with voice recognition software  Occasional wrong word or "sound a like" substitutions may have occurred due to the inherent limitations of voice recognition software  Read the chart carefully and recognize, using context, where substitutions have occurred

## 2022-04-05 NOTE — OCCUPATIONAL THERAPY NOTE
OT EVALUATION       04/05/22 1012   Note Type   Note type Evaluation   Restrictions/Precautions   Other Precautions O2   Pain Assessment   Pain Assessment Tool 0-10   Pain Score 8   Pain Location/Orientation Location: Abdomen   Home Living   Type of [address] Two level;1/2 bath on main level;Bed/bath upstairs  (3 ADRIANA)   Bathroom Shower/Tub Tub/shower unit   Home Equipment Walker   Prior Function   Level of [place] Independent with ADLs and functional mobility   Lives With Son   Receives Help From Family   Vocational Full time employment  (marko)   ADL   Eating Assistance 7  Independent   Grooming Assistance 7  Independent   UB Bathing Assistance 7  Independent   LB Bathing Assistance 7  Independent   [address] 7  Independent    [address] 7  [address]  7  Independent   Bed Mobility   Supine to Sit 7  Independent   Sit to Supine 7  Independent   Transfers   Sit to Stand 7  Independent   Stand to Sit 7  Independent   Stand pivot 7  Independent   Functional Mobility   Functional Mobility 7  Independent   Additional Comments functional household distance   Balance   Static Sitting Good   Dynamic Sitting Good   Static Standing Good   Dynamic Standing Good   Activity Tolerance   Activity Tolerance Patient tolerated treatment well   RUE Assessment   RUE Assessment WFL   LUE Assessment   LUE Assessment WFL   Cognition   Overall Cognitive Status WFL   Arousal/Participation Cooperative   Attention Within functional limits   Orientation Level Oriented X4   Following Commands Follows all commands and directions without difficulty   Assessment   Assessment Patient evaluated by Occupational Therapy  Patient admitted with Cellulitis of multiple sites of trunk  The patients occupational profile, medical and therapy history includes a extensive additional review of physical, cognitive, or psychosocial history related to current functional performance  Comorbidities affecting functional mobility and ADLS include: arthritis, CAD, diabetes, hypertension and MI  Prior to admission, patient was independent with functional mobility without assistive device, independent with ADLS and independent with IADLS  The evaluation identifies the following performance deficits: no deficits, that result in activity limitations and/or participation restrictions  This evaluation requires clinical decision making of low complexity, because the patients presents with no comorbidities that affect occupational performance and required no modification of tasks or assistance with consideration of a limited number of treatment options  The Barthel Index was used as a functional outcome tool presenting with a score of Barthel Index Score: 100, indicating no limitations of functional mobility and ADLS  The patient's raw score on the AM-PAC Daily Activity inpatient short form is 24, standardized score is 57 54, greater than 39 4  Patients at this level are likely to benefit from DC to home  Please refer to the recommendation of the Occupational Therapist for safe DC planning  Patient is independent with functional mobility and ADLS  No skilled OT needs required at this time  Discharge OT      Recommendation   OT Discharge Recommendation No rehabilitation needs   AM-PAC Daily Activity Inpatient   Lower Body Dressing 4   Bathing 4   Toileting 4   Upper Body Dressing 4   Grooming 4   Eating 4   Daily Activity Raw Score 24   Daily Activity Standardized Score (Calc for Raw Score >=11) 57 54   AM-PAC Applied Cognition Inpatient   Following a Speech/Presentation 4   Understanding Ordinary Conversation 4   Taking Medications 4   Remembering Where Things Are Placed or Put Away 4   Remembering List of 4-5 Errands 4   Taking Care of Complicated Tasks 4   Applied Cognition Raw Score 24   Applied Cognition Standardized Score 62 21   Barthel Index   Feeding 10   Bathing 5   Grooming Score 5   Dressing Score 10   Bladder Score 10   Bowels Score 10   Toilet Use Score 10   Transfers (Bed/Chair) Score 15   Mobility (Level Surface) Score 15   Stairs Score 10   Barthel Index Score 344   Licensure   [place] License Number  [employer] [address] OTR/L [id number]

## 2022-04-05 NOTE — PLAN OF CARE
Problem: MOBILITY - ADULT  Goal: Maintain or return to baseline ADL function  Description: INTERVENTIONS:  -  Assess patient's ability to carry out ADLs; assess patient's baseline for ADL function and identify physical deficits which impact ability to perform ADLs (bathing, care of mouth/teeth, toileting, grooming, dressing, etc )  - Assess/evaluate cause of self-care deficits   - Assess range of motion  - Assess patient's mobility; develop plan if impaired  - Assess patient's need for assistive devices and provide as appropriate  - Encourage maximum independence but intervene and supervise when necessary  - Involve family in performance of ADLs  - Assess for home care needs following discharge   - Consider OT consult to assist with ADL evaluation and planning for discharge  - Provide patient education as appropriate  Outcome: Progressing  Goal: Maintains/Returns to pre admission functional level  Description: INTERVENTIONS:  - Perform BMAT or MOVE assessment daily    - Set and communicate daily mobility goal to care team and patient/family/caregiver     - Collaborate with rehabilitation services on mobility goals if consulted  - Stand patient 3 times a day  - Ambulate patient 3 times a day  - Out of bed to chair 3 times a day   - Out of bed for meals 3 times a day  - Out of bed for toileting  - Record patient progress and toleration of activity level   Outcome: Progressing     Problem: Prexisting or High Potential for Compromised Skin Integrity  Goal: Skin integrity is maintained or improved  Description: INTERVENTIONS:  - Identify patients at risk for skin breakdown  - Assess and monitor skin integrity  - Assess and monitor nutrition and hydration status  - Monitor labs   - Assess for incontinence   - Turn and reposition patient  - Assist with mobility/ambulation  - Relieve pressure over bony prominences  - Avoid friction and shearing  - Provide appropriate hygiene as needed including keeping skin clean and dry  - Evaluate need for skin moisturizer/barrier cream  - Collaborate with interdisciplinary team   - Patient/family teaching  - Consider wound care consult   Outcome: Progressing     Problem: Potential for Falls  Goal: Patient will remain free of falls  Description: INTERVENTIONS:  - Educate patient/family on patient safety including physical limitations  - Instruct patient to call for assistance with activity   - Consult OT/PT to assist with strengthening/mobility   - Keep Call bell within reach  - Keep bed low and locked with side rails adjusted as appropriate  - Keep care items and personal belongings within reach  - Initiate and maintain comfort rounds  - Make Fall Risk Sign visible to staff  - Offer Toileting every 2 Hours, in advance of need    Outcome: Progressing     Problem: PAIN - ADULT  Goal: Verbalizes/displays adequate comfort level or baseline comfort level  Description: Interventions:  - Encourage patient to monitor pain and request assistance  - Assess pain using appropriate pain scale  - Administer analgesics based on type and severity of pain and evaluate response  - Implement non-pharmacological measures as appropriate and evaluate response  - Consider cultural and social influences on pain and pain management  - Notify physician/advanced practitioner if interventions unsuccessful or patient reports new pain  Outcome: Progressing     Problem: INFECTION - ADULT  Goal: Absence or prevention of progression during hospitalization  Description: INTERVENTIONS:  - Assess and monitor for signs and symptoms of infection  - Monitor lab/diagnostic results  - Monitor all insertion sites, i e  indwelling lines, tubes, and drains  - Monitor endotracheal if appropriate and nasal secretions for changes in amount and color  - [place] appropriate cooling/warming therapies per order  - Administer medications as ordered  - Instruct and encourage patient and family to use good hand hygiene technique  - Identify and instruct in appropriate isolation precautions for identified infection/condition  Outcome: Progressing     Problem: DISCHARGE PLANNING  Goal: Discharge to home or other facility with appropriate resources  Description: INTERVENTIONS:  - Identify barriers to discharge w/patient and caregiver  - Arrange for needed discharge resources and transportation as appropriate  - Identify discharge learning needs (meds, wound care, etc )  - Arrange for interpretive services to assist at discharge as needed  - Refer to Case Management Department for coordinating discharge planning if the patient needs post-hospital services based on physician/advanced practitioner order or complex needs related to functional status, cognitive ability, or social support system  Outcome: Progressing     Problem: Knowledge Deficit  Goal: Patient/family/caregiver demonstrates understanding of disease process, treatment plan, medications, and discharge instructions  Description: Complete learning assessment and assess knowledge base    Interventions:  - Provide teaching at level of understanding  - Provide teaching via preferred learning methods  Outcome: Progressing

## 2022-04-06 LAB
ANION GAP SERPL CALCULATED.3IONS-SCNC: 5 MMOL/L (ref 4–13)
BACTERIA UR QL AUTO: NORMAL /HPF
BILIRUB UR QL STRIP: NEGATIVE
BUN SERPL-MCNC: 15 MG/DL (ref 5–25)
CALCIUM SERPL-MCNC: 8.5 MG/DL (ref 8.3–10.1)
CHLORIDE SERPL-SCNC: 102 MMOL/L (ref 100–108)
CLARITY UR: CLEAR
CO2 SERPL-SCNC: 33 MMOL/L (ref 21–32)
COLOR UR: YELLOW
CREAT SERPL-MCNC: 0.99 MG/DL (ref 0.6–1.3)
GFR SERPL CREATININE-BSD FRML MDRD: 94 ML/MIN/1.73SQ M
GLUCOSE SERPL-MCNC: 114 MG/DL (ref 65–140)
GLUCOSE SERPL-MCNC: 114 MG/DL (ref 65–140)
GLUCOSE SERPL-MCNC: 124 MG/DL (ref 65–140)
GLUCOSE SERPL-MCNC: 149 MG/DL (ref 65–140)
GLUCOSE SERPL-MCNC: 169 MG/DL (ref 65–140)
GLUCOSE UR STRIP-MCNC: NEGATIVE MG/DL
HGB UR QL STRIP.AUTO: NEGATIVE
KETONES UR STRIP-MCNC: NEGATIVE MG/DL
LEUKOCYTE ESTERASE UR QL STRIP: NEGATIVE
NITRITE UR QL STRIP: NEGATIVE
NON-SQ EPI CELLS URNS QL MICRO: NORMAL /HPF
PH UR STRIP.AUTO: 6 [PH]
POTASSIUM SERPL-SCNC: 4.7 MMOL/L (ref 3.5–5.3)
PROT UR STRIP-MCNC: ABNORMAL MG/DL
RBC #/AREA URNS AUTO: NORMAL /HPF
SODIUM SERPL-SCNC: 140 MMOL/L (ref 136–145)
SP GR UR STRIP.AUTO: 1.02 (ref 1–1.03)
UROBILINOGEN UR QL STRIP.AUTO: 0.2 E.U./DL
WBC #/AREA URNS AUTO: NORMAL /HPF

## 2022-04-06 PROCEDURE — 81001 URINALYSIS AUTO W/SCOPE: CPT

## 2022-04-06 PROCEDURE — 82948 REAGENT STRIP/BLOOD GLUCOSE: CPT

## 2022-04-06 PROCEDURE — 99231 SBSQ HOSP IP/OBS SF/LOW 25: CPT | Performed by: STUDENT IN AN ORGANIZED HEALTH CARE EDUCATION/TRAINING PROGRAM

## 2022-04-06 PROCEDURE — 99232 SBSQ HOSP IP/OBS MODERATE 35: CPT | Performed by: INTERNAL MEDICINE

## 2022-04-06 PROCEDURE — 80048 BASIC METABOLIC PNL TOTAL CA: CPT | Performed by: STUDENT IN AN ORGANIZED HEALTH CARE EDUCATION/TRAINING PROGRAM

## 2022-04-06 RX ADMIN — FAMOTIDINE 20 MG: 20 TABLET, FILM COATED ORAL at 09:24

## 2022-04-06 RX ADMIN — HYDROMORPHONE HYDROCHLORIDE 1 MG: 1 INJECTION, SOLUTION INTRAMUSCULAR; INTRAVENOUS; SUBCUTANEOUS at 14:05

## 2022-04-06 RX ADMIN — CEFAZOLIN SODIUM 2000 MG: 2 SOLUTION INTRAVENOUS at 11:57

## 2022-04-06 RX ADMIN — HYDROMORPHONE HYDROCHLORIDE 1 MG: 1 INJECTION, SOLUTION INTRAMUSCULAR; INTRAVENOUS; SUBCUTANEOUS at 19:46

## 2022-04-06 RX ADMIN — OXYCODONE HYDROCHLORIDE AND ACETAMINOPHEN 1 TABLET: 5; 325 TABLET ORAL at 17:48

## 2022-04-06 RX ADMIN — INSULIN LISPRO 2 UNITS: 100 INJECTION, SOLUTION INTRAVENOUS; SUBCUTANEOUS at 12:00

## 2022-04-06 RX ADMIN — ENOXAPARIN SODIUM 60 MG: 60 INJECTION SUBCUTANEOUS at 09:23

## 2022-04-06 RX ADMIN — OXYCODONE HYDROCHLORIDE AND ACETAMINOPHEN 1 TABLET: 5; 325 TABLET ORAL at 05:58

## 2022-04-06 RX ADMIN — AMLODIPINE BESYLATE 10 MG: 10 TABLET ORAL at 09:23

## 2022-04-06 RX ADMIN — ZINC SULFATE 220 MG (50 MG) CAPSULE 220 MG: CAPSULE at 09:23

## 2022-04-06 RX ADMIN — FAMOTIDINE 20 MG: 20 TABLET, FILM COATED ORAL at 17:02

## 2022-04-06 RX ADMIN — CARVEDILOL 6.25 MG: 6.25 TABLET, FILM COATED ORAL at 17:02

## 2022-04-06 RX ADMIN — CARVEDILOL 6.25 MG: 6.25 TABLET, FILM COATED ORAL at 09:24

## 2022-04-06 RX ADMIN — ASPIRIN 81 MG: 81 TABLET, COATED ORAL at 09:23

## 2022-04-06 RX ADMIN — CEFAZOLIN SODIUM 2000 MG: 2 SOLUTION INTRAVENOUS at 05:58

## 2022-04-06 RX ADMIN — CEFAZOLIN SODIUM 2000 MG: 2 SOLUTION INTRAVENOUS at 22:02

## 2022-04-06 RX ADMIN — LISINOPRIL 10 MG: 10 TABLET ORAL at 09:24

## 2022-04-06 RX ADMIN — OXYCODONE HYDROCHLORIDE AND ACETAMINOPHEN 1 TABLET: 5; 325 TABLET ORAL at 11:57

## 2022-04-06 RX ADMIN — OXYCODONE HYDROCHLORIDE AND ACETAMINOPHEN 1 TABLET: 5; 325 TABLET ORAL at 23:51

## 2022-04-06 RX ADMIN — CEFAZOLIN SODIUM 2000 MG: 2 SOLUTION INTRAVENOUS at 17:01

## 2022-04-06 RX ADMIN — Medication 6 MG: at 22:02

## 2022-04-06 RX ADMIN — HYDROMORPHONE HYDROCHLORIDE 1 MG: 1 INJECTION, SOLUTION INTRAMUSCULAR; INTRAVENOUS; SUBCUTANEOUS at 07:51

## 2022-04-06 RX ADMIN — ATORVASTATIN CALCIUM 80 MG: 80 TABLET, FILM COATED ORAL at 17:02

## 2022-04-06 RX ADMIN — HYDROMORPHONE HYDROCHLORIDE 1 MG: 1 INJECTION, SOLUTION INTRAMUSCULAR; INTRAVENOUS; SUBCUTANEOUS at 01:40

## 2022-04-06 NOTE — PLAN OF CARE
Problem: MOBILITY - ADULT  Goal: Maintain or return to baseline ADL function  Description: INTERVENTIONS:  -  Assess patient's ability to carry out ADLs; assess patient's baseline for ADL function and identify physical deficits which impact ability to perform ADLs (bathing, care of mouth/teeth, toileting, grooming, dressing, etc )  - Assess/evaluate cause of self-care deficits   - Assess range of motion  - Assess patient's mobility; develop plan if impaired  - Assess patient's need for assistive devices and provide as appropriate  - Encourage maximum independence but intervene and supervise when necessary  - Involve family in performance of ADLs  - Assess for home care needs following discharge   - Consider OT consult to assist with ADL evaluation and planning for discharge  - Provide patient education as appropriate  Outcome: Progressing  Goal: Maintains/Returns to pre admission functional level  Description: INTERVENTIONS:  - Perform BMAT or MOVE assessment daily    - Set and communicate daily mobility goal to care team and patient/family/caregiver     - Collaborate with rehabilitation services on mobility goals if consulted  - Stand patient 3 times a day  - Ambulate patient 3 times a day  - Out of bed to chair 3 times a day   - Out of bed for meals 3 times a day  - Out of bed for toileting  - Record patient progress and toleration of activity level   Outcome: Progressing     Problem: Prexisting or High Potential for Compromised Skin Integrity  Goal: Skin integrity is maintained or improved  Description: INTERVENTIONS:  - Identify patients at risk for skin breakdown  - Assess and monitor skin integrity  - Assess and monitor nutrition and hydration status  - Monitor labs   - Assess for incontinence   - Turn and reposition patient  - Assist with mobility/ambulation  - Relieve pressure over bony prominences  - Avoid friction and shearing  - Provide appropriate hygiene as needed including keeping skin clean and dry  - Evaluate need for skin moisturizer/barrier cream  - Collaborate with interdisciplinary team   - Patient/family teaching  - Consider wound care consult   Outcome: Progressing     Problem: Potential for Falls  Goal: Patient will remain free of falls  Description: INTERVENTIONS:  - Educate patient/family on patient safety including physical limitations  - Instruct patient to call for assistance with activity   - Consult OT/PT to assist with strengthening/mobility   - Keep Call bell within reach  - Keep bed low and locked with side rails adjusted as appropriate  - Keep care items and personal belongings within reach  - Initiate and maintain comfort rounds  - Make Fall Risk Sign visible to staff  - Offer Toileting every 2 Hours, in advance of need    Outcome: Progressing     Problem: PAIN - ADULT  Goal: Verbalizes/displays adequate comfort level or baseline comfort level  Description: Interventions:  - Encourage patient to monitor pain and request assistance  - Assess pain using appropriate pain scale  - Administer analgesics based on type and severity of pain and evaluate response  - Implement non-pharmacological measures as appropriate and evaluate response  - Consider cultural and social influences on pain and pain management  - Notify physician/advanced practitioner if interventions unsuccessful or patient reports new pain  Outcome: Progressing     Problem: INFECTION - ADULT  Goal: Absence or prevention of progression during hospitalization  Description: INTERVENTIONS:  - Assess and monitor for signs and symptoms of infection  - Monitor lab/diagnostic results  - Monitor all insertion sites, i e  indwelling lines, tubes, and drains  - Monitor endotracheal if appropriate and nasal secretions for changes in amount and color  - [place] appropriate cooling/warming therapies per order  - Administer medications as ordered  - Instruct and encourage patient and family to use good hand hygiene technique  - Identify and instruct in appropriate isolation precautions for identified infection/condition  Outcome: Progressing     Problem: DISCHARGE PLANNING  Goal: Discharge to home or other facility with appropriate resources  Description: INTERVENTIONS:  - Identify barriers to discharge w/patient and caregiver  - Arrange for needed discharge resources and transportation as appropriate  - Identify discharge learning needs (meds, wound care, etc )  - Arrange for interpretive services to assist at discharge as needed  - Refer to Case Management Department for coordinating discharge planning if the patient needs post-hospital services based on physician/advanced practitioner order or complex needs related to functional status, cognitive ability, or social support system  Outcome: Progressing     Problem: Knowledge Deficit  Goal: Patient/family/caregiver demonstrates understanding of disease process, treatment plan, medications, and discharge instructions  Description: Complete learning assessment and assess knowledge base    Interventions:  - Provide teaching at level of understanding  - Provide teaching via preferred learning methods  Outcome: Progressing

## 2022-04-06 NOTE — PROGRESS NOTES
Progress Note - Infectious Disease   Haritha Lincoln 37 y o  male MRN: [id number]  Unit/Bed#: [address] [place] [address] Encounter: [id number]      Impression/Plan:  1   Abdominal wall cellulitis/panniculitis   Clinically appearing streptococcal   2022 wound culture from the abdominal wall has grown 3+ group C Streptococcus, few colonies of MSSA and few colonies of group G Streptococcus   2022 CT abdomen and pelvis consistent with panniculitis, no intra-abdominal abscess  -continue cefazolin high-dose 2 g IV every 6 hours  -monitor serial exam over next 24 hours   -Once significantly improved, can transition to high-dose Keflex 1 g every 6 hours to complete a total 2 week antibiotic course from high dose cefazolin start 22 through 22     2  Groin intertrigo and chronic left groin thigh chronic wound   Possible portal of entry  -local topical and wound care per wound care RN  -serial exam     3   History of left groin hidradenitis   Status post excision 2021 with chronic wound      4   Morbid obesity   BMI 59 2   Risk factor for infection, poor wound healing   -lifestyle modification and weight loss management discussed and patient receptive     5   Poorly controlled DM   With hyperglycemia   Recent A1c 2022 7 5   Risk factor for infection   -blood glucose management per primary care team     Antibiotics:  High dose Cefazolin D2-3     Subjective:  Patient has no fever, chills, sweats overnight; no nausea, vomiting, diarrhea; no cough, shortness of breath; no increased abdominal pain  No new symptoms      Objective:  Vitals:  Temp:  [97 2 °F (36 2 °C)-98 1 °F (36 7 °C)] 98 1 °F (36 7 °C)  HR:  [57-75] 75  Resp:  [16-19] 16  BP: (146-159)/(83-92) 153/83  SpO2:  [86 %-95 %] 86 %  Temp (24hrs), Av 7 °F (36 5 °C), Min:97 2 °F (36 2 °C), Max:98 1 °F (36 7 °C)  Current: Temperature: 98 1 °F (36 7 °C)    Physical Exam:   General Appearance:  81-year-old morbidly obese male just returned from the shower, resting comfortably in bed, alert, interactive, nontoxic, no acute distress  Throat: Oropharynx moist without lesions  Lungs:   Clear to auscultation bilaterally; no wheezes, rhonchi or rales; respirations unlabored   Heart:  RRR; decreased tones   Abdomen:   Soft, protuberant pannus with a slowly dissipating pink hue within marked border of lower half of abdomen, positive bowel sounds  Extremities: No clubbing, cyanosis, + LE venous staining edema   : No Robbins, no SPT   Skin: No new rashes or lesions  IV site nontender  Less maceration of groin folds       Labs, Imaging, & Other studies:   All pertinent labs and imaging studies were personally reviewed  Results from last 7 days   Lab Units 04/05/22  0542 04/03/22  0454 04/02/22  0542   WBC Thousand/uL 8 69 8 03 7 06   HEMOGLOBIN g/dL 9 7* 8 8* 9 3*   PLATELETS Thousands/uL 437* 375 355     Results from last 7 days   Lab Units 04/06/22  0602 04/05/22  0542 04/05/22  0542 04/04/22  4635 04/04/22  0681 04/03/22  0454 04/02/22  0542 04/01/22  0511 04/01/22  0511 03/31/22  0458 03/31/22  0458   SODIUM mmol/L 140  --  139  --  137   < > 131*   < > 134*   < > 137   POTASSIUM mmol/L 4 7   < > 5 0   < > 4 8   < > 5 0   < > 4 6   < > 4 3   CHLORIDE mmol/L 102   < > 102   < > 102   < > 100   < > 99*   < > 100   CO2 mmol/L 33*   < > 34*   < > 30   < > 25   < > 22   < > 29   BUN mg/dL 15   < > 16   < > 20   < > 31*   < > 27*   < > 20   CREATININE mg/dL 0 99   < > 1 08   < > 0 99   < > 1 22   < > 1 13   < > 1 09   EGFR ml/min/1 73sq m 94   < > 85   < > 94   < > 73   < > 80   < > 84   CALCIUM mg/dL 8 5   < > 8 6   < > 8 4   < > 8 4   < > 8 2*   < > 8 0*   AST U/L  --   --   --   --   --   --  26  --  22  --  19   ALT U/L  --   --   --   --   --   --  15   < > 16   < > 19   ALK PHOS U/L  --   --   --   --   --   --  146*   < > 125*   < > 99    < > = values in this interval not displayed       Results from last 7 days   Lab Units 03/30/22  1700   GRAM STAIN RESULT  1+ Gram positive cocci in pairs*  1+ Polys*   WOUND CULTURE  3+ Growth of Beta Hemolytic Streptococcus Group C*  Few Colonies of Staphylococcus aureus*  Few Colonies of Beta Hemolytic Streptococcus Group G*  Few Colonies of              Results from last 7 days   Lab Units 04/01/22  0511   FERRITIN ng/mL 38

## 2022-04-06 NOTE — UTILIZATION REVIEW
Continued Stay Review    Date: 4/6/22                          Current Patient Class: inpatient  Current Level of Care: med surg    HPI:41 y o  male initially admitted on 3/30/22     Assessment/Plan:   Recurrent cellulitis/panniculitis, Hidradenitis suppurativa  Abd with persistent tenderness & redness  Continues to require IV Dilaudid for pain control  Vital Signs:   /85   Pulse 58   Temp 97 5 °F (36 4 °C)   Resp 19   SpO2 93%     Pertinent Labs/Diagnostic Results:   Results from last 7 days   Lab Units 04/05/22  0542 04/03/22 0454 04/02/22  0542 04/01/22  0511 04/01/22  0511 03/31/22  0458 03/31/22  0458   WBC Thousand/uL 8 69 8 03 7 06   < > 6 65   < > 7 35   HEMOGLOBIN g/dL 9 7* 8 8* 9 3*  --  9 1*  --  9 0*   HEMATOCRIT % 35 6* 32 5* 34 1*  --  31 5*  --  32 6*   PLATELETS Thousands/uL 437* 375 355   < > 333   < > 295   NEUTROS ABS Thousands/µL  --   --  4 86  --   --   --  4 96    < > = values in this interval not displayed  Results from last 7 days   Lab Units 04/06/22  0602 04/05/22  0542 04/04/22  4205 04/03/22  0454 04/02/22  0542 04/01/22  0511 03/31/22  0458   SODIUM mmol/L 140 139 137 134* 131*   < > 137   POTASSIUM mmol/L 4 7 5 0 4 8 4 9 5 0   < > 4 3   CHLORIDE mmol/L 102 102 102 101 100   < > 100   CO2 mmol/L 33* 34* 30 28 25   < > 29   ANION GAP mmol/L 5 3* 5 5 6   < > 8   BUN mg/dL 15 16 20 26* 31*   < > 20   CREATININE mg/dL 0 99 1 08 0 99 1 07 1 22   < > 1 09   EGFR ml/min/1 73sq m 94 85 94 86 73   < > 84   CALCIUM mg/dL 8 5 8 6 8 4 8 1* 8 4   < > 8 0*   MAGNESIUM mg/dL  --   --   --   --   --   --  1 9    < > = values in this interval not displayed       Results from last 7 days   Lab Units 04/02/22  0542 04/01/22  0511 03/31/22  0458   AST U/L 26 22 19   ALT U/L 15 16 19   ALK PHOS U/L 146* 125* 99   TOTAL PROTEIN g/dL 7 9 7 9 7 5   ALBUMIN g/dL 2 3* 2 3* 2 2*   TOTAL BILIRUBIN mg/dL 0 35 0 28 0 28     Results from last 7 days   Lab Units 04/06/22  1115 04/06/22  0735 04/05/22  2050 04/05/22  1606 04/05/22  1109 04/05/22  0707 04/04/22 2017 04/04/22  1657 04/04/22  1125 04/04/22  0712 04/03/22 2011 04/03/22  1558   POC GLUCOSE mg/dl 169* 114 125 120 160* 114 189* 120 158* 124 130 125     Results from last 7 days   Lab Units 04/06/22  0602 04/05/22  0542 04/04/22  1545 04/03/22  0454 04/02/22  0542 04/01/22  0511 03/31/22  0458   GLUCOSE RANDOM mg/dL 114 110 120 136 113 123 115     Results from last 7 days   Lab Units 04/05/22  1005   PH MAGUE  7 301   PCO2 MAGUE mm Hg 71 2*   PO2 MAGUE mm Hg 45 3*   HCO3 MAGUE mmol/L 34 3*   BASE EXC MAGUE mmol/L 5 8   O2 CONTENT MAGUE ml/dL 13 1   O2 HGB, VENOUS % 75 0     Results from last 7 days   Lab Units 04/01/22  0511   FERRITIN ng/mL 38     Results from last 7 days   Lab Units 04/06/22  1235   CLARITY UA  Clear   COLOR UA  Yellow   SPEC GRAV UA  1 025   PH UA  6 0   GLUCOSE UA mg/dl Negative   KETONES UA mg/dl Negative   BLOOD UA  Negative   PROTEIN UA mg/dl Trace*   NITRITE UA  Negative   BILIRUBIN UA  Negative   UROBILINOGEN UA E U /dl 0 2   LEUKOCYTES UA  Negative   WBC UA /hpf 0-1   RBC UA /hpf None Seen   BACTERIA UA /hpf None Seen   EPITHELIAL CELLS WET PREP /hpf Occasional     Results from last 7 days   Lab Units 03/30/22  1700   GRAM STAIN RESULT  1+ Gram positive cocci in pairs*  1+ Polys*   WOUND CULTURE  3+ Growth of Beta Hemolytic Streptococcus Group C*  Few Colonies of Staphylococcus aureus*  Few Colonies of Beta Hemolytic Streptococcus Group G*  Few Colonies of      Medications:   amLODIPine, 10 mg, Oral, Daily  aspirin, 81 mg, Oral, Daily  atorvastatin, 80 mg, Oral, Daily With Dinner  carvedilol, 6 25 mg, Oral, BID With Meals  cefazolin, 2,000 mg, Intravenous, Q6H  enoxaparin, 60 mg, Subcutaneous, Daily  famotidine, 20 mg, Oral, BID  insulin lispro, 2-12 Units, Subcutaneous, 4x Daily (AC & HS)  lisinopril, 10 mg, Oral, Daily  melatonin, 6 mg, Oral, HS  oxyCODONE-acetaminophen, 1 tablet, Oral, Q6H SHAZIA  zinc sulfate, 220 mg, Oral, Daily    PRN Meds:  acetaminophen, 650 mg, Oral, Q6H PRN  albuterol, 2 puff, Inhalation, Q6H PRN  HYDROmorphone, 1 mg, Intravenous, Q4H PRN-used x6/24hrs    Discharge Plan: [facility]    Network Utilization Review Department  ATTENTION: Please call with any questions or concerns to [phone] and carefully listen to the prompts so that you are directed to the right person  All voicemails are confidential   [place] all requests for admission clinical reviews, approved or denied determinations and any other requests to dedicated fax number below belonging to the campus where the patient is receiving treatment   List of dedicated fax numbers for the Facilities:  [address] DENIALS (Administrative/Medical Necessity) [phone]   [address] (Maternity/NICU/Pediatrics) [phone]   [address] [place] [address]  [address] [place] [address] [address] [place] [address] [address] [place] [address] [facility] [place] [address] [phone]

## 2022-04-06 NOTE — PROGRESS NOTES
[facility] Progress Note   Marino Host 37 y o  male   MRN: [id number]    Unit/Bed#: [address] [place] [address] Encounter: [id number]    SUMMARY: 41 y o  male patient admitted for management of lower abdominal cellulitis  Cellulitis shows slight improvement but has not resolved  Pain controlled with Dilaudid and Percocet  Patient currently on 4 L/min of O2 per NC  Continues to saturate in the low 90's  BP elevated this morning  Will continue antibiosis per ID  Pain management per primary care team  Will continue to monitor respiratory status  Patient took BP meds AM  Will FU on BP      F: None   E: No repletion needed     N: Diet Markie/CHO Controlled; Consistent Carbohydrate Diet Level 2 (5 carb servings/75 grams CHO/meal)  D: Enoxaparin (LOVENOX) subcutaneous injection 60 mg, Daily   A: Cefazolin 2g, IV, Q6H (Started on 4/4/22)     Disposition: Continue on Med - Surg   Code Status: Level 1 - Full Code    ASSESSMENT/PLAN:     * Cellulitis of multiple sites of trunk  Assessment & Plan    Acute    Patient with a history of multiple sites of cellulitis with last admission in 12/2021  Presented with cellulitis of abdomen and pannus  Patient also has wound with some drainage  On previous admission, patient was started on Ancef, 2 g, IV, Q8H and discharged on Keflex  Wound cultures grew beta-hemolytic strep and Staphylococcus haemolyticus which was susceptible to vancomycin  Was given Vancomycin in the ED  Patient was placed on Vancomycin and Ancef with limited response  Was switched to clindamycin on 4/3  CT AP (3/30): No discrete intra-abdominal or pelvic abscess  Skin thickening and inflammatory stranding in the lower anterior abdominal wall pannus suggestive of cellulitis    Wound Culture (3/30): 1+ Gram positive cocci in pairs, 1+ Polys    · Consult ID for antibiotic management   · Continue Clindamycin 300 mg, IV, Q8H  · Continue Wound Care  · Continue to monitor expansion of cellulitis  · Abdominal pads to maintain area under pannus dry     Nocturnal hypoxia  Assessment & Plan    Chronic    SpO2 on admission was 99% on RA  Patient has history of nocturnal hypoxemia  In 12/2021, patient had overnight O2 evaluation which showed significant hypoxemia with lowest desaturation of 59%  At that time, patient was advised to follow-up with pulmonology for complete sleep study  Patient has been saturating consistently in the low 80's at HS  Currently on 4 L of oxygen per NC  Home O2 Sleep Study (4/3): Baseline SpO2 at rest was 89%, during exertion is 85%, on O2 at rest is 92% at 4 L/min, and during exertion with O2 is 92% at 6 L/min    · Continue oxygen therapy   · Titrate oxygen as needed at HS   · Case management for home oxygen   · Needs polysomnography done OP  · Follow up with pulmonology at discharge          Microcytic anemia  Assessment & Plan    Chronic    Patient presented with low hemoglobin of 9 8 on admission  Baseline Hgb is high 8's - 9's  Has been anemic since 2/2021  Hgb trend: 9 8 > 9 0 > 9 1 > 9 3 > 8 8     · Iron sucrose (VENOFER) 300 mg in  mL, IV, Daily   · Trend hemoglobin   · Transfuse if Hgb < 7 0   · CBC AM     Type 2 diabetes mellitus with diabetic polyneuropathy, without long-term current use of insulin (HCC)  Assessment & Plan    Chronic, stable    Patient HbA1c in 11/2021 was 7 0  Repeat HbA1c on admission was 7 5  Serum Glucose have been stable, mostly in the mid 100's  · Hold home medications at this time   · Metformin (GLUCOPHAGE) 1000 mg, PO, BID with Meals     · Glimepiride (AMARYL) 4 mg, PO, Daily with breakfast   · ISS   · Monitor serum glucose    · Monitor for basal and meal time insuline requirement IP    Hidradenitis suppurativa  Assessment & Plan    Chronic     Patient has history of hidranitis suppurativa with wounds currently present on left groin and abdominal pannus       · Wound care consulted  · Wound cultures pending   · Daily dressings   · Continue antibiotics as noted above  Swelling of left lower extremity  Assessment & Plan    Chronic     Patient with a history of hidradenitis suppurativa on bilateral groin and thigh  Patient previously had cyst removed from his left thigh which required anesthesia  He has been following up with wound care weekly  Per patient, he is now to see them every 3 weeks  On PE, his left lower extremity circumference is significantly increased compared to right  States that this is chronic problem and actually looks better than usual  Also has history of lymphedema on left LE  VAS LL Venous duplex (3/30): RLL LIMITED: No evidence of thrombus in the common femoral vein  Normal response to augmentation maneuvers  LLL: No evidence of acute or chronic DVT  No evidence of superficial thrombophlebitis noted  Normal response to augmentation maneuvers  Popliteal, posterior tibial and anterior tibial arterial Doppler waveforms are triphasic  · Enoxaparin (LOVENOX) 60 mg, SC, Daily   · Continue to monitor    Morbid obesity with body mass index (BMI) of 50 0 to 59 9 in adult [facility])  Assessment & Plan    Chronic    Patient last estimated body mass index was 59 52 kg/m² on 3/7/2022  Has multiple chronic comorbid conditions  · Manage comorbid conditions   · Consider weight management OP         Type 1 non-ST elevation myocardial infarction (NSTEMI) [facility])  Assessment & Plan    Per history    Patient has a history of MI with stent placement in 2001  Patient follows with Dr Fidelia Jarvis outpatient  · Continue home medications  · Carvedilol "COREG" 6 25 mg, PO, BID     Essential hypertension  Assessment & Plan    Chronic     BP's on admission were in the 180's/90's       · Continue home medications  · Amlodipine (NORVASC) 10 mg, PO, Daily   · Carvedilol (COREG) 6 25 mg, PO, BID  · Lisinopril (ZESTRIL) 10 mg, PO, Daily   · Monitor BP     Coronary artery disease  Assessment & Plan    Chronic     Patient with a history of cardiac catheterization in  and   Follows with cardiology outpatient  Denies any chest pain and shortness of breath on admission  Patient states not taking ticagrelor (BRILINTA) 90 mg, PO, Q12H  · Continue home medications  · Carvedilol (COREG) 6 25 mg, PO, BID  · Amlodipine (NORVASC) 10 mg, PO, BID  · Aspirin 81 mg, PO, Daily     -------------------------------------------------------------------------------------  HPI - 24HR EVENTS    There were no overnight concerning events or episodes of fever  Patient's vital signs were stable throughout the night  No complaints     ---------------------------------------------------------------------------------------  SUBJECTIVE    Went to round on patient throughout the morning  He was encountered lying in bed  Was AAO x3 and in NAD  He mentioned that he is feeling a bit better but still has pain 8/10 in cellulitis area  Review of Systems    Patient denied not fever, chills or concerning events during the night  Had no SOB, chest pain, N/V, diarrhea, or any other related symptoms  Has been tolerating diet  Has been urinating and defecating adequately  No other complaints    ---------------------------------------------------------------------------------------  OBJECTIVE    Vitals     Vitals:    22 1554 22 1931 22 2211 22 9464   BP: 138/81 146/92 148/89 159/87   BP Location:       Pulse: 57 68  57   Resp: 18 17 16 19   Temp: 97 6 °F (36 4 °C) 97 9 °F (36 6 °C) 97 9 °F (36 6 °C) (!) 97 2 °F (36 2 °C)   TempSrc:       SpO2: 92% 95%  93%     Temp (24hrs), Av 7 °F (36 5 °C), Min:97 2 °F (36 2 °C), Max:97 9 °F (36 6 °C)  Current: Temperature: (!) 97 2 °F (36 2 °C)    Respiratory:    SpO2: SpO2: 93 %     SpO2 Activity: At Rest    Nasal Cannula O2 Flow Rate (L/min): 4 L/min    PHYSICAL EXAM:    Constitutional:       Appearance: He is obese  He is not diaphoretic     HENT:      Head: Normocephalic and atraumatic       Right Ear: External ear normal       Left Ear: External ear normal       Nose: Nose normal    Eyes:      General: No scleral icterus      Conjunctiva/sclera: Conjunctivae normal    Cardiovascular:      Rate and Rhythm: Normal rate and regular rhythm       Pulses: Normal pulses       Heart sounds: Normal heart sounds  Pulmonary:      Effort: Pulmonary effort is normal       Breath sounds: Normal breath sounds  Abdominal:      Palpations: Abdomen is soft       Tenderness: There is abdominal tenderness       Comments: Large habitus  Cellulitis below the umbilicus diffusely throughout the lower abdomen - slowly improving - less erythematous   Skin:     General: Skin is warm and dry       Comments: Cellulitis of the lower posterior left leg - slowly improving     Neurological:      Mental Status: He is oriented to person, place, and time  Psychiatric:         Mood and Affect: Mood normal          BehaviorCruz Duster         Thought Content:  Thought content normal          Judgment: Judgment normal  Laboratory and Diagnostics:    Results from last 7 days   Lab Units 04/05/22  0542 04/03/22  0454 04/02/22  0542 04/01/22  0511 03/31/22  0458 03/30/22  1141   WBC Thousand/uL 8 69 8 03 7 06 6 65 7 35 8 23   HEMOGLOBIN g/dL 9 7* 8 8* 9 3* 9 1* 9 0* 9 8*   HEMATOCRIT % 35 6* 32 5* 34 1* 31 5* 32 6* 35 1*   PLATELETS Thousands/uL 437* 375 355 333 295 342   NEUTROS PCT %  --   --  68  --  68 80*   MONOS PCT %  --   --  10  --  11 9     Results from last 7 days   Lab Units 04/06/22  0602 04/05/22  0542 04/04/22  8566 04/03/22  0454 04/02/22  0542 04/01/22  0511 03/31/22  0458 03/30/22  1141 03/30/22  1141   SODIUM mmol/L 140 139 137 134* 131* 134* 137   < > 138   POTASSIUM mmol/L 4 7 5 0 4 8 4 9 5 0 4 6 4 3   < > 4 1   CHLORIDE mmol/L 102 102 102 101 100 99* 100   < > 102   CO2 mmol/L 33* 34* 30 28 25 22 29   < > 32   ANION GAP mmol/L 5 3* 5 5 6 13 8   < > 4   BUN mg/dL 15 16 20 26* 31* 27* 20   < > 16   CREATININE mg/dL 0 99 1  08 0 99 1 07 1 22 1 13 1 09   < > 0 96   CALCIUM mg/dL 8 5 8 6 8 4 8 1* 8 4 8 2* 8 0*   < > 8 1*   GLUCOSE RANDOM mg/dL 114 110 120 136 113 123 115   < > 126   ALT U/L  --   --   --   --  15 16 19  --  19   AST U/L  --   --   --   --  26 22 19  --  25   ALK PHOS U/L  --   --   --   --  146* 125* 99  --  98   ALBUMIN g/dL  --   --   --   --  2 3* 2 3* 2 2*  --  2 4*   TOTAL BILIRUBIN mg/dL  --   --   --   --  0 35 0 28 0 28  --  0 23    < > = values in this interval not displayed  Results from last 7 days   Lab Units 03/31/22  0458   MAGNESIUM mg/dL 1 9      Results from last 7 days   Lab Units 03/30/22  1141   INR  1 02   PTT seconds 29          Results from last 7 days   Lab Units 03/30/22  1141   LACTIC ACID mmol/L 0 5     Results from last 7 days   Lab Units 04/05/22  1005   PH MAGUE  7 301   PCO2 MAGUE mm Hg 71 2*   PO2 MAGUE mm Hg 45 3*   HCO3 MAGUE mmol/L 34 3*   BASE EXC MAGUE mmol/L 5 8     MICROBIOLOGY RESULTS:    Results from last 7 days   Lab Units 03/30/22  1700 03/30/22  1141   BLOOD CULTURE   --  No Growth After 5 Days  No Growth After 5 Days  GRAM STAIN RESULT  1+ Gram positive cocci in pairs*  1+ Polys*  --    WOUND CULTURE  3+ Growth of Beta Hemolytic Streptococcus Group C*  Few Colonies of Staphylococcus aureus*  Few Colonies of Beta Hemolytic Streptococcus Group G*  Few Colonies of   --      Imaging: I have personally reviewed pertinent reports  VAS lower limb venous duplex study, unilateral/limited   Final Result      CT abdomen pelvis w contrast   Final Result      No discrete intra-abdominal or pelvic abscess  Skin thickening and inflammatory stranding in the lower anterior abdominal wall pannus suggestive of cellulitis  XR chest 1 view portable   Final Result      No acute cardiopulmonary disease  Intake and Output    I/O       04/04 0701 04/05 0700 04/05 0701 04/06 0700 04/06 0701 04/07 0700    P  O  24 360     I V  5 20     Total Intake 29 380     Net +29 +380 Unmeasured Urine Occurrence 1 x        Height and Weights     There is no height or weight on file to calculate BMI  Weight (last 2 days)     None        Nutrition         Diet Orders   (From admission, onward)             Start     Ordered    03/30/22 1716  Diet Markie/CHO Controlled; Consistent Carbohydrate Diet Level 2 (5 carb servings/75 grams CHO/meal)  Diet effective now        References:    Nutrtion Support Algorithm Enteral vs  Parenteral   Question Answer Comment   Diet Type Markie/CHO Controlled    Markie/CHO Controlled Consistent Carbohydrate Diet Level 2 (5 carb servings/75 grams CHO/meal)    RD to adjust diet per protocol?  Yes        03/30/22 1715    03/30/22 1627  Room Service  Once        Question:  Type of Service  Answer:  Room Service-Appropriate    03/30/22 1626            Active Medications    Scheduled Meds:    Current Facility-Administered Medications   Medication Dose Route Frequency Provider Last Rate    acetaminophen  650 mg Oral Q6H PRN Saima Grumet, DO      albuterol  2 puff Inhalation Q6H PRN Saima Rizot, DO      amLODIPine  10 mg Oral Daily [facility]      aspirin  81 mg Oral Daily [facility]      atorvastatin  80 mg Oral Daily With [place]      carvedilol  6 25 mg Oral BID With Meals Saima Kelly [facility]      cefazolin  2,000 mg Intravenous Q6H Lorelei Doyle PA-C 2,000 mg (04/06/22 0558)    enoxaparin  60 mg Subcutaneous Daily Mariah Dickson MD      famotidine  20 mg Oral BID Bárbara Gannon MD      HYDROmorphone  1 mg Intravenous Q4H PRN Bárbara Gannon MD      insulin lispro  2-12 Units Subcutaneous 4x Daily (AC & HS) Saima Kelly, DO      lisinopril  10 mg Oral Daily [facility]      melatonin  6 mg Oral HS [facility]aim[facility]      nystatin   Topical TID [facility]      oxyCODONE-acetaminophen  1 tablet Oral Q6H [facility] Saima Kelly [facility]      zinc sulfate  220 mg Oral Daily Sirnivas Good MD PRN Meds:     acetaminophen, 650 mg, Q6H PRN  albuterol, 2 puff, Q6H PRN  HYDROmorphone, 1 mg, Q4H PRN    Invasive Devices Review    Invasive Devices  Report    Peripheral Intravenous Line            Peripheral IV 04/05/22 Right Arm <1 day              It was a pleasure to be of service of Bee Moctezuma MD, MSMS  [address]  PGY1 - [facility]      Portions of the record may have been created with voice recognition software  Occasional wrong word or "sound a like" substitutions may have occurred due to the inherent limitations of voice recognition software  Read the chart carefully and recognize, using context, where substitutions have occurred

## 2022-04-06 NOTE — QUICK NOTE
When nurse attempted to perform wound care after the patient showered, the patient refused to let me look at left thigh and sacrum wound  He said he manages this himself at home and sees wound care outpatient  Educated the patient about the wound care nurse orders and that she is the expert and wrote these orders specifically for him, but he continued to refuse  I was allowed to put Interdry under abdominal folds however

## 2022-04-07 LAB
ANION GAP SERPL CALCULATED.3IONS-SCNC: 3 MMOL/L (ref 4–13)
BUN SERPL-MCNC: 13 MG/DL (ref 5–25)
CALCIUM SERPL-MCNC: 8.6 MG/DL (ref 8.3–10.1)
CHLORIDE SERPL-SCNC: 101 MMOL/L (ref 100–108)
CO2 SERPL-SCNC: 35 MMOL/L (ref 21–32)
CREAT SERPL-MCNC: 1.03 MG/DL (ref 0.6–1.3)
ERYTHROCYTE [DISTWIDTH] IN BLOOD BY AUTOMATED COUNT: 18.7 % (ref 11.6–15.1)
GFR SERPL CREATININE-BSD FRML MDRD: 90 ML/MIN/1.73SQ M
GLUCOSE SERPL-MCNC: 107 MG/DL (ref 65–140)
GLUCOSE SERPL-MCNC: 113 MG/DL (ref 65–140)
GLUCOSE SERPL-MCNC: 116 MG/DL (ref 65–140)
GLUCOSE SERPL-MCNC: 118 MG/DL (ref 65–140)
GLUCOSE SERPL-MCNC: 143 MG/DL (ref 65–140)
HCT VFR BLD AUTO: 34.1 % (ref 36.5–49.3)
HGB BLD-MCNC: 9.5 G/DL (ref 12–17)
MCH RBC QN AUTO: 22.8 PG (ref 26.8–34.3)
MCHC RBC AUTO-ENTMCNC: 27.9 G/DL (ref 31.4–37.4)
MCV RBC AUTO: 82 FL (ref 82–98)
PLATELET # BLD AUTO: 416 THOUSANDS/UL (ref 149–390)
PMV BLD AUTO: 9 FL (ref 8.9–12.7)
POTASSIUM SERPL-SCNC: 4.4 MMOL/L (ref 3.5–5.3)
RBC # BLD AUTO: 4.17 MILLION/UL (ref 3.88–5.62)
SODIUM SERPL-SCNC: 139 MMOL/L (ref 136–145)
WBC # BLD AUTO: 8.13 THOUSAND/UL (ref 4.31–10.16)

## 2022-04-07 PROCEDURE — 99231 SBSQ HOSP IP/OBS SF/LOW 25: CPT | Performed by: STUDENT IN AN ORGANIZED HEALTH CARE EDUCATION/TRAINING PROGRAM

## 2022-04-07 PROCEDURE — 85027 COMPLETE CBC AUTOMATED: CPT | Performed by: STUDENT IN AN ORGANIZED HEALTH CARE EDUCATION/TRAINING PROGRAM

## 2022-04-07 PROCEDURE — 82948 REAGENT STRIP/BLOOD GLUCOSE: CPT

## 2022-04-07 PROCEDURE — 80048 BASIC METABOLIC PNL TOTAL CA: CPT | Performed by: STUDENT IN AN ORGANIZED HEALTH CARE EDUCATION/TRAINING PROGRAM

## 2022-04-07 PROCEDURE — 99232 SBSQ HOSP IP/OBS MODERATE 35: CPT | Performed by: INTERNAL MEDICINE

## 2022-04-07 RX ORDER — FAMOTIDINE 20 MG/1
20 TABLET, FILM COATED ORAL 2 TIMES DAILY
Qty: 60 TABLET | Refills: 0 | Status: CANCELLED | OUTPATIENT
Start: 2022-04-07 | End: 2022-05-07

## 2022-04-07 RX ADMIN — LISINOPRIL 10 MG: 10 TABLET ORAL at 09:15

## 2022-04-07 RX ADMIN — HYDROMORPHONE HYDROCHLORIDE 1 MG: 1 INJECTION, SOLUTION INTRAMUSCULAR; INTRAVENOUS; SUBCUTANEOUS at 13:48

## 2022-04-07 RX ADMIN — AMLODIPINE BESYLATE 10 MG: 10 TABLET ORAL at 09:15

## 2022-04-07 RX ADMIN — ENOXAPARIN SODIUM 60 MG: 60 INJECTION SUBCUTANEOUS at 09:15

## 2022-04-07 RX ADMIN — FAMOTIDINE 20 MG: 20 TABLET, FILM COATED ORAL at 09:15

## 2022-04-07 RX ADMIN — HYDROMORPHONE HYDROCHLORIDE 1 MG: 1 INJECTION, SOLUTION INTRAMUSCULAR; INTRAVENOUS; SUBCUTANEOUS at 02:26

## 2022-04-07 RX ADMIN — CEFAZOLIN SODIUM 2000 MG: 2 SOLUTION INTRAVENOUS at 22:43

## 2022-04-07 RX ADMIN — OXYCODONE HYDROCHLORIDE AND ACETAMINOPHEN 1 TABLET: 5; 325 TABLET ORAL at 11:47

## 2022-04-07 RX ADMIN — OXYCODONE HYDROCHLORIDE AND ACETAMINOPHEN 1 TABLET: 5; 325 TABLET ORAL at 17:33

## 2022-04-07 RX ADMIN — HYDROMORPHONE HYDROCHLORIDE 1 MG: 1 INJECTION, SOLUTION INTRAMUSCULAR; INTRAVENOUS; SUBCUTANEOUS at 07:28

## 2022-04-07 RX ADMIN — FAMOTIDINE 20 MG: 20 TABLET, FILM COATED ORAL at 17:06

## 2022-04-07 RX ADMIN — HYDROMORPHONE HYDROCHLORIDE 1 MG: 1 INJECTION, SOLUTION INTRAMUSCULAR; INTRAVENOUS; SUBCUTANEOUS at 18:42

## 2022-04-07 RX ADMIN — CARVEDILOL 6.25 MG: 6.25 TABLET, FILM COATED ORAL at 17:07

## 2022-04-07 RX ADMIN — CEFAZOLIN SODIUM 2000 MG: 2 SOLUTION INTRAVENOUS at 04:35

## 2022-04-07 RX ADMIN — ZINC SULFATE 220 MG (50 MG) CAPSULE 220 MG: CAPSULE at 09:15

## 2022-04-07 RX ADMIN — OXYCODONE HYDROCHLORIDE AND ACETAMINOPHEN 1 TABLET: 5; 325 TABLET ORAL at 06:14

## 2022-04-07 RX ADMIN — ATORVASTATIN CALCIUM 80 MG: 80 TABLET, FILM COATED ORAL at 17:06

## 2022-04-07 RX ADMIN — HYDROMORPHONE HYDROCHLORIDE 1 MG: 1 INJECTION, SOLUTION INTRAMUSCULAR; INTRAVENOUS; SUBCUTANEOUS at 22:43

## 2022-04-07 RX ADMIN — CEFAZOLIN SODIUM 2000 MG: 2 SOLUTION INTRAVENOUS at 11:50

## 2022-04-07 RX ADMIN — ASPIRIN 81 MG: 81 TABLET, COATED ORAL at 09:15

## 2022-04-07 RX ADMIN — CARVEDILOL 6.25 MG: 6.25 TABLET, FILM COATED ORAL at 07:28

## 2022-04-07 RX ADMIN — CEFAZOLIN SODIUM 2000 MG: 2 SOLUTION INTRAVENOUS at 17:07

## 2022-04-07 RX ADMIN — Medication 6 MG: at 22:43

## 2022-04-07 NOTE — PROGRESS NOTES
[facility] 26 Progress Note   Collins Henao 37 y o  male   MRN: [id number]    Unit/Bed#: [address] [place] [address] Encounter: [id number]    SUMMARY: 41 y o  male patient admitted for management of lower abdominal cellulitis  Cellulitis shows slight improvement but has not resolved  Pain controlled with Dilaudid and Percocet  Patient currently on 4 L/min of O2 per NC  Continues to saturate in the low 90's  BP elevated this morning  Will continue antibiosis per ID  Pain management per primary care team  Will continue to monitor respiratory status  Possible discharge Saturday      F: None   E: No repletion needed     N: Diet Markie/CHO Controlled; Consistent Carbohydrate Diet Level 2 (5 carb servings/75 grams CHO/meal)  D: Enoxaparin (LOVENOX) subcutaneous injection 60 mg, Daily   A: Cefazolin 2g, IV, Q6H (Started on 4/4/22)     Disposition: Continue on Med - Surg   Code Status: Level 1 - Full Code    ASSESSMENT/PLAN:     * Cellulitis of multiple sites of trunk  Assessment & Plan    Acute    Patient with a history of multiple sites of cellulitis with last admission in 12/2021  Presented with cellulitis of abdomen and pannus  Patient also has wound with some drainage  On previous admission, patient was started on Ancef, 2 g, IV, Q8H and discharged on Keflex  Wound cultures grew beta-hemolytic strep and Staphylococcus haemolyticus which was susceptible to vancomycin  Was given Vancomycin in the ED  Patient was placed on Vancomycin and Ancef with limited response  Was switched to clindamycin on 4/3  Was switch to cefazolin on 4/4  CT AP (3/30): No discrete intra-abdominal or pelvic abscess  Skin thickening and inflammatory stranding in the lower anterior abdominal wall pannus suggestive of cellulitis    Wound Culture (3/30): 1+ Gram positive cocci in pairs, 1+ Polys    · Continue Cefazolin 2g, IV, Q6H (Started on 4/4/22)  · Continue Wound Care  · Continue to monitor expansion of cellulitis  · Abdominal pads to maintain area under pannus dry     Nocturnal hypoxia  Assessment & Plan    Chronic    SpO2 on admission was 99% on RA  Patient has history of nocturnal hypoxemia  In 12/2021, patient had overnight O2 evaluation which showed significant hypoxemia with lowest desaturation of 59%  At that time, patient was advised to follow-up with pulmonology for complete sleep study  Patient has been saturating consistently in the low 80's at HS  Currently on 4 L of oxygen per NC  Home O2 Sleep Study (4/3): Baseline SpO2 at rest was 89%, during exertion is 85%, on O2 at rest is 92% at 4 L/min, and during exertion with O2 is 92% at 6 L/min    · Continue oxygen therapy   · Titrate oxygen as needed at HS   · Case management for home oxygen   · Needs polysomnography done OP  · Follow up with pulmonology at discharge          Microcytic anemia  Assessment & Plan    Chronic    Patient presented with low hemoglobin of 9 8 on admission  Baseline Hgb is high 8's - 9's  Has been anemic since 2/2021  Hgb trend: 9 8 > 9 0 > 9 1 > 9 3 > 8 8     · Iron sucrose (VENOFER) 300 mg in  mL, IV, Daily   · Trend hemoglobin   · Transfuse if Hgb < 7 0   · CBC AM     Type 2 diabetes mellitus with diabetic polyneuropathy, without long-term current use of insulin (HCC)  Assessment & Plan    Chronic, stable    Patient HbA1c in 11/2021 was 7 0  Repeat HbA1c on admission was 7 5  Serum Glucose have been stable, mostly in the mid 100's  · Hold home medications at this time   · Metformin (GLUCOPHAGE) 1000 mg, PO, BID with Meals     · Glimepiride (AMARYL) 4 mg, PO, Daily with breakfast   · ISS   · Monitor serum glucose    · Monitor for basal and meal time insuline requirement IP    Hidradenitis suppurativa  Assessment & Plan    Chronic     Patient has history of hidranitis suppurativa with wounds currently present on left groin and abdominal pannus       · Wound care consulted  · Wound cultures pending   · Daily dressings   · Continue antibiotics as noted above  Swelling of left lower extremity  Assessment & Plan    Chronic     Patient with a history of hidradenitis suppurativa on bilateral groin and thigh  Patient previously had cyst removed from his left thigh which required anesthesia  He has been following up with wound care weekly  Per patient, he is now to see them every 3 weeks  On PE, his left lower extremity circumference is significantly increased compared to right  States that this is chronic problem and actually looks better than usual  Also has history of lymphedema on left LE  VAS LL Venous duplex (3/30): RLL LIMITED: No evidence of thrombus in the common femoral vein  Normal response to augmentation maneuvers  LLL: No evidence of acute or chronic DVT  No evidence of superficial thrombophlebitis noted  Normal response to augmentation maneuvers  Popliteal, posterior tibial and anterior tibial arterial Doppler waveforms are triphasic  · Enoxaparin (LOVENOX) 60 mg, SC, Daily   · Continue to monitor    Morbid obesity with body mass index (BMI) of 50 0 to 59 9 in adult [facility])  Assessment & Plan    Chronic    Patient last estimated body mass index was 59 52 kg/m² on 3/7/2022  Has multiple chronic comorbid conditions  · Manage comorbid conditions   · Consider weight management OP         Type 1 non-ST elevation myocardial infarction (NSTEMI) [facility])  Assessment & Plan    Per history    Patient has a history of MI with stent placement in 2001  Patient follows with Dr Katie Sandoval outpatient  · Continue home medications  · Carvedilol "COREG" 6 25 mg, PO, BID     Essential hypertension  Assessment & Plan    Chronic     BP's on admission were in the 180's/90's       · Continue home medications  · Amlodipine (NORVASC) 10 mg, PO, Daily   · Carvedilol (COREG) 6 25 mg, PO, BID  · Lisinopril (ZESTRIL) 10 mg, PO, Daily   · Monitor BP     Coronary artery disease  Assessment & Plan    Chronic     Patient with a history of cardiac catheterization in  and   Follows with cardiology outpatient  Denies any chest pain and shortness of breath on admission  Patient states not taking ticagrelor (BRILINTA) 90 mg, PO, Q12H  · Continue home medications  · Carvedilol (COREG) 6 25 mg, PO, BID  · Amlodipine (NORVASC) 10 mg, PO, BID  · Aspirin 81 mg, PO, Daily     -------------------------------------------------------------------------------------  HPI - 24HR EVENTS    There were no overnight concerning events or episodes of fever  Patient's vital signs were stable throughout the night  No complaints   ---------------------------------------------------------------------------------------  SUBJECTIVE    Went to round on patient throughout the morning  He was encountered lying in bed resting comfortably  Was AAO x3 and in NAD  He mentioned that he was feeling better but still has pain localized to the medial part of his pannus  Review of Systems    Patient denied not fever, chills or concerning events during the night  Had no SOB, chest pain, N/V, diarrhea, abdominal pain, or any other related symptoms  Has been tolerating diet  Has been urinating and defecating adequately  No other complaints    ---------------------------------------------------------------------------------------  OBJECTIVE    Vitals     Vitals:    22 1517 22 1846 22 2202 22 0711   BP: 153/83 167/85 161/80 161/81   Pulse: 75 68 61 60   Resp: 16 20 18 19   Temp: 98 1 °F (36 7 °C) 98 °F (36 7 °C) 98 1 °F (36 7 °C) (!) 97 4 °F (36 3 °C)   TempSrc:       SpO2: (!) 86% 93% 93% 95%     Temp (24hrs), Av 8 °F (36 6 °C), Min:97 4 °F (36 3 °C), Max:98 1 °F (36 7 °C)  Current: Temperature: (!) 97 4 °F (36 3 °C)    Respiratory:    SpO2: SpO2: 95 %, SpO2 Activity: SpO2 Activity: At Rest    Nasal Cannula O2 Flow Rate (L/min): 4 L/min    PHYSICAL EXAM:    Constitutional:       Appearance: He is obese  He is not diaphoretic     HENT:      Head: Normocephalic and atraumatic       Right Ear: External ear normal       Left Ear: External ear normal       Nose: Nose normal    Eyes:      General: No scleral icterus      Conjunctiva/sclera: Conjunctivae normal    Cardiovascular:      Rate and Rhythm: Normal rate and regular rhythm       Pulses: Normal pulses       Heart sounds: Normal heart sounds  Pulmonary:      Effort: Pulmonary effort is normal       Breath sounds: Normal breath sounds  Abdominal:      Palpations: Abdomen is soft       Tenderness: There is abdominal tenderness       Comments: Large habitus  Cellulitis below the umbilicus diffusely throughout the lower abdomen - slowly improving - less erythematous  Skin:     General: Skin is warm and dry       Comments: Cellulitis of the lower posterior left leg - slowly improving     Neurological:      Mental Status: He is oriented to person, place, and time  Psychiatric:         Mood and Affect: Mood normal          BehaviorRupinder Monk         Thought Content:  Thought content normal          Judgment: Judgment normal    Laboratory and Diagnostics:    Results from last 7 days   Lab Units 04/07/22  0438 04/05/22  0542 04/03/22  0454 04/02/22  0542 04/01/22  0511   WBC Thousand/uL 8 13 8 69 8 03 7 06 6 65   HEMOGLOBIN g/dL 9 5* 9 7* 8 8* 9 3* 9 1*   HEMATOCRIT % 34 1* 35 6* 32 5* 34 1* 31 5*   PLATELETS Thousands/uL 416* 437* 375 355 333   NEUTROS PCT %  --   --   --  68  --    MONOS PCT %  --   --   --  10  --      Results from last 7 days   Lab Units 04/07/22  0438 04/06/22  0602 04/05/22  0542 04/04/22  0608 04/03/22  0454 04/02/22  0542 04/01/22  0511   SODIUM mmol/L 139 140 139 137 134* 131* 134*   POTASSIUM mmol/L 4 4 4 7 5 0 4 8 4 9 5 0 4 6   CHLORIDE mmol/L 101 102 102 102 101 100 99*   CO2 mmol/L 35* 33* 34* 30 28 25 22   ANION GAP mmol/L 3* 5 3* 5 5 6 13   BUN mg/dL 13 15 16 20 26* 31* 27*   CREATININE mg/dL 1 03 0 99 1 08 0 99 1 07 1 22 1 13   CALCIUM mg/dL 8 6 8 5 8 6 8 4 8 1* 8 4 8 2*   GLUCOSE RANDOM mg/dL 113 114 110 120 136 113 123   ALT U/L  --   --   --   --   --  15 16   AST U/L  --   --   --   --   --  26 22   ALK PHOS U/L  --   --   --   --   --  146* 125*   ALBUMIN g/dL  --   --   --   --   --  2 3* 2 3*   TOTAL BILIRUBIN mg/dL  --   --   --   --   --  0 35 0 28     Results from last 7 days   Lab Units 04/05/22  1005   PH MAGUE  7 301   PCO2 MAGUE mm Hg 71 2*   PO2 MAGUE mm Hg 45 3*   HCO3 MAGUE mmol/L 34 3*   BASE EXC MAGUE mmol/L 5 8     MICROBIOLOGY RESULTS:    Imaging: I have personally reviewed pertinent reports  VAS lower limb venous duplex study, unilateral/limited   Final Result      CT abdomen pelvis w contrast   Final Result      No discrete intra-abdominal or pelvic abscess  Skin thickening and inflammatory stranding in the lower anterior abdominal wall pannus suggestive of cellulitis  XR chest 1 view portable   Final Result      No acute cardiopulmonary disease  Intake and Output    I/O       04/05 0701 04/06 0700 04/06 0701 04/07 0700 04/07 0701 04/08 0700    P  O  360 300     I V  20      Total Intake 380 300     Net +380 +300                Height and Weights     There is no height or weight on file to calculate BMI  Weight (last 2 days)     None        Nutrition         Diet Orders   (From admission, onward)             Start     Ordered    03/30/22 1716  Diet Markie/CHO Controlled; Consistent Carbohydrate Diet Level 2 (5 carb servings/75 grams CHO/meal)  Diet effective now        References:    Nutrtion Support Algorithm Enteral vs  Parenteral   Question Answer Comment   Diet Type Markie/CHO Controlled    Markie/CHO Controlled Consistent Carbohydrate Diet Level 2 (5 carb servings/75 grams CHO/meal)    RD to adjust diet per protocol?  Yes        03/30/22 1715    03/30/22 1627  Room Service  Once        Question:  Type of Service  Answer:  Room Service-Appropriate    03/30/22 1626              Active Medications    Scheduled Meds:    Current Facility-Administered Medications   Medication Dose Route Frequency Provider Last Rate    acetaminophen  650 mg Oral Q6H PRN Levonne Esa, DO      albuterol  2 puff Inhalation Q6H PRN Levonne Esa, DO      amLODIPine  10 mg Oral Daily Levonne [facility], [address]      aspirin  81 mg Oral Daily Levonne Spear, [address]      atorvastatin  80 mg Oral Daily With Fito-Kay, [address]      carvedilol  6 25 mg Oral BID With Meals Levonne Esa, DO      cefazolin  2,000 mg Intravenous Q6H Caro Louis PA-C 2,000 mg (04/07/22 1150)    enoxaparin  60 mg Subcutaneous Daily David Lockwood MD      famotidine  20 mg Oral BID Zac Almanza MD      HYDROmorphone  1 mg Intravenous Q4H PRN Zac Almanza MD      insulin lispro  2-12 Units Subcutaneous 4x Daily (AC & HS) Levonne Esa, DO      lisinopril  10 mg Oral Daily Levonne Spear, [address]      melatonin  6 mg Oral HS Levoe [facility], [address]      oxyCODONE-acetaminophen  1 tablet Oral Q6H [address] Levonne [facility], [address]      zinc sulfate  220 mg Oral Daily Bebeto Salomon MD       PRN Meds:     acetaminophen, 650 mg, Q6H PRN  albuterol, 2 puff, Q6H PRN  HYDROmorphone, 1 mg, Q4H PRN    Invasive Devices Review    Invasive Devices  Report    Peripheral Intravenous Line            Peripheral IV 04/05/22 Right Arm 2 days            It was a pleasure to be of service of Macho Salas MD  [address] [facility] PGY1 - [employer]      Portions of the record may have been created with voice recognition software  Occasional wrong word or "sound a like" substitutions may have occurred due to the inherent limitations of voice recognition software  Read the chart carefully and recognize, using context, where substitutions have occurred

## 2022-04-07 NOTE — PROGRESS NOTES
Progress Note - Infectious Disease   Miquel Abo 37 y o  male MRN: [id number]  Unit/Bed#: [address] [place] [address] Encounter: [id number]      Impression/Plan:  1   Abdominal wall cellulitis/panniculitis   Clinically appearing streptococcal   2022 wound culture from the abdominal wall has grown 3+ group C Streptococcus, few colonies of MSSA and few colonies of group G Streptococcus   2022 CT abdomen and pelvis consistent with panniculitis, no intra-abdominal abscess  -continue cefazolin high-dose 2 g IV every 6 hours through 22 am dose  -As steadily improving significantly, upon discharge can transition to high-dose Keflex 1 g every 6 hours to complete a total 2 week antibiotic course from high dose cefazolin start 22 through 22     2  Groin intertrigo and chronic left groin thigh chronic wound   Possible portal of entry  -local topical and wound care per wound care RN  -serial exam     3   History of left groin hidradenitis   Status post excision 2021 with chronic wound      4   Morbid obesity   BMI 59 2   Risk factor for infection, poor wound healing   -lifestyle modification and weight loss management discussed and patient receptive     5   Poorly controlled DM   With hyperglycemia   Recent A1c 2022 7 5   Risk factor for infection   -blood glucose management per primary care team     Antibiotics:  High dose Cefazolin D3-4     Subjective:  Patient has no fever, chills, sweats overnight; no nausea, vomiting, diarrhea; no cough, shortness of breath; no increased abdominal pain  No new symptoms      Objective:  Vitals:  Temp:  [97 4 °F (36 3 °C)-98 1 °F (36 7 °C)] 97 4 °F (36 3 °C)  HR:  [60-75] 60  Resp:  [16-20] 19  BP: (153-167)/(80-85) 161/81  SpO2:  [86 %-95 %] 95 %  Temp (24hrs), Av 9 °F (36 6 °C), Min:97 4 °F (36 3 °C), Max:98 1 °F (36 7 °C)  Current: Temperature: (!) 97 4 °F (36 3 °C)    Physical Exam:   General Appearance:  30-year-old morbidly obese male, propped comfortably bed, alert, interactive, nontoxic, no acute distress  Throat: Oropharynx moist without lesions  Lungs:   Clear to auscultation bilaterally; no wheezes, rhonchi or rales; respirations unlabored   Heart:  RRR; no murmur   Abdomen:   Soft, protuberant pannus, slowly dissipating pink hue within marked border of lower half of abdomen, positive bowel sounds  Extremities: No clubbing, cyanosis, + LE chronic venous staining edema   : No Robbins, no SPT   Skin: No new rashes or lesions  IV site nontender  Less maceration of groin folds       Labs, Imaging, & Other studies:   All pertinent labs and imaging studies were personally reviewed  Results from last 7 days   Lab Units 04/07/22  0438 04/05/22  0542 04/03/22  0454   WBC Thousand/uL 8 13 8 69 8 03   HEMOGLOBIN g/dL 9 5* 9 7* 8 8*   PLATELETS Thousands/uL 416* 437* 375     Results from last 7 days   Lab Units 04/07/22  0438 04/06/22  0602 04/06/22  0602 04/05/22  0542 04/05/22  0542 04/03/22  0454 04/02/22  0542 04/01/22  0511 04/01/22  0511   SODIUM mmol/L 139  --  140  --  139   < > 131*   < > 134*   POTASSIUM mmol/L 4 4   < > 4 7   < > 5 0   < > 5 0   < > 4 6   CHLORIDE mmol/L 101   < > 102   < > 102   < > 100   < > 99*   CO2 mmol/L 35*   < > 33*   < > 34*   < > 25   < > 22   BUN mg/dL 13   < > 15   < > 16   < > 31*   < > 27*   CREATININE mg/dL 1 03   < > 0 99   < > 1 08   < > 1 22   < > 1 13   EGFR ml/min/1 73sq m 90   < > 94   < > 85   < > 73   < > 80   CALCIUM mg/dL 8 6   < > 8 5   < > 8 6   < > 8 4   < > 8 2*   AST U/L  --   --   --   --   --   --  26  --  22   ALT U/L  --   --   --   --   --   --  15   < > 16   ALK PHOS U/L  --   --   --   --   --   --  146*   < > 125*    < > = values in this interval not displayed                   Results from last 7 days   Lab Units 04/01/22  0511   FERRITIN ng/mL 38

## 2022-04-07 NOTE — UTILIZATION REVIEW
Continued Stay Review    Date: 4- 7-22                      Current Patient Class: inpatient  Current Level of Care: med surg     HPI:41 y o  male initially admitted on 3-30-22   Abdominal wall cellulitis/panniculitis   Clinically appearing streptococcal   03/30/2022 wound culture from the abdominal wall has grown 3+ group C Streptococcus, few colonies of MSSA and few colonies of group G Streptococcus   03/30/2022 CT abdomen and pelvis consistent with panniculitis, no intra-abdominal abscess    Assessment/Plan:   Soft, protuberant pannus, slowly dissipating pink hue within marked border of lower half of abdomen  Continue high dose  iv ancef  Patient continues to require 4L O2nc to maintain sts at least 86 to 95%          Vital Signs:    Date/Time Temp Pulse Resp BP MAP (mmHg) SpO2 Nasal Cannula O2 Flow Rate (L/min) O2 Device   04/07/22 07:11:19 97 4 °F (36 3 °C)   Abnormal  60 19 161/81 108 95 % -- --   04/06/22 22:02:04 98 1 °F (36 7 °C) 61 18 161/80 107 93 % -- --   04/06/22 2000 -- -- -- -- -- -- 4 L/min Nasal cannula   04/06/22 18:46:26 98 °F (36 7 °C) 68 20 167/85 112 93 % -- --   04/06/22 15:17:59 98 1 °F (36 7 °C) 75 16 153/83 106 86 % Abnormal  -- --   04/06/22 09:25:15 97 5 °F (36 4 °C) 58 -- 157/85 109 93 % -- --   04/06/22 0900 -- -- -- -- -- -- 4 L/min Nasal cannula   04/06/22 07:54:41 97 2 °F (36 2 °C)   Abnormal  57 19 159/87 111 93 % -- --   04/06/22 0210 -- -- -- -- -- -- 4 L/min Nasal cannula                   Pertinent Labs/Diagnostic Results:       Results from last 7 days   Lab Units 04/07/22  0438 04/05/22  0542 04/03/22  0454 04/02/22  0542 04/02/22  0542 04/01/22  0511 04/01/22  0511   WBC Thousand/uL 8 13 8 69 8 03   < > 7 06   < > 6 65   HEMOGLOBIN g/dL 9 5* 9 7* 8 8*  --  9 3*  --  9 1*   HEMATOCRIT % 34 1* 35 6* 32 5*  --  34 1*  --  31 5*   PLATELETS Thousands/uL 416* 437* 375   < > 355   < > 333   NEUTROS ABS Thousands/µL  --   --   --   --  4 86  --   --     < > = values in this Follow up w/ Dr. Stewart Dec 18th @ 9:50am    interval not displayed           Results from last 7 days   Lab Units 04/07/22  0438 04/06/22  0602 04/05/22  0542 04/04/22  0608 04/03/22  0454   SODIUM mmol/L 139 140 139 137 134*   POTASSIUM mmol/L 4 4 4 7 5 0 4 8 4 9   CHLORIDE mmol/L 101 102 102 102 101   CO2 mmol/L 35* 33* 34* 30 28   ANION GAP mmol/L 3* 5 3* 5 5   BUN mg/dL 13 15 16 20 26*   CREATININE mg/dL 1 03 0 99 1 08 0 99 1 07   EGFR ml/min/1 73sq m 90 94 85 94 86   CALCIUM mg/dL 8 6 8 5 8 6 8 4 8 1*     Results from last 7 days   Lab Units 04/02/22  0542 04/01/22  0511   AST U/L 26 22   ALT U/L 15 16   ALK PHOS U/L 146* 125*   TOTAL PROTEIN g/dL 7 9 7 9   ALBUMIN g/dL 2 3* 2 3*   TOTAL BILIRUBIN mg/dL 0 35 0 28     Results from last 7 days   Lab Units 04/07/22  1149 04/07/22  0725 04/06/22 2055 04/06/22  1540 04/06/22  1115 04/06/22  0735 04/05/22  2050 04/05/22  1606 04/05/22  1109 04/05/22  0707 04/04/22  2017 04/04/22  1657   POC GLUCOSE mg/dl 143* 118 124 149* 169* 114 125 120 160* 114 189* 120     Results from last 7 days   Lab Units 04/07/22  0438 04/06/22  0602 04/05/22  0542 04/04/22  0608 04/03/22  0454 04/02/22  0542 04/01/22  0511   GLUCOSE RANDOM mg/dL 113 114 110 120 136 113 123       Results from last 7 days   Lab Units 04/05/22  1005   PH MAGUE  7 301   PCO2 MAGUE mm Hg 71 2*   PO2 MAGUE mm Hg 45 3*   HCO3 MAGUE mmol/L 34 3*   BASE EXC MAGUE mmol/L 5 8   O2 CONTENT MAGUE ml/dL 13 1   O2 HGB, VENOUS % 75 0     Results from last 7 days   Lab Units 04/01/22  0511   FERRITIN ng/mL 38       Results from last 7 days   Lab Units 04/06/22  1235   CLARITY UA  Clear   COLOR UA  Yellow   SPEC GRAV UA  1 025   PH UA  6 0   GLUCOSE UA mg/dl Negative   KETONES UA mg/dl Negative   BLOOD UA  Negative   PROTEIN UA mg/dl Trace*   NITRITE UA  Negative   BILIRUBIN UA  Negative   UROBILINOGEN UA E U /dl 0 2   LEUKOCYTES UA  Negative   WBC UA /hpf 0-1   RBC UA /hpf None Seen   BACTERIA UA /hpf None Seen   EPITHELIAL CELLS WET PREP /hpf Occasional       Scheduled Medications:    amLODIPine, 10 mg, Oral, Daily  aspirin, 81 mg, Oral, Daily  atorvastatin, 80 mg, Oral, Daily With Dinner  carvedilol, 6 25 mg, Oral, BID With Meals  cefazolin, 2,000 mg, Intravenous, Q6H  enoxaparin, 60 mg, Subcutaneous, Daily  famotidine, 20 mg, Oral, BID  insulin lispro, 2-12 Units, Subcutaneous, 4x Daily (AC & HS)  lisinopril, 10 mg, Oral, Daily  melatonin, 6 mg, Oral, HS  oxyCODONE-acetaminophen, 1 tablet, Oral, Q6H SHAZIA  zinc sulfate, 220 mg, Oral, Daily      Continuous IV Infusions:     PRN Meds:  acetaminophen, 650 mg, Oral, Q6H PRN  albuterol, 2 puff, Inhalation, Q6H PRN  HYDROmorphone, 1 mg, Intravenous, Q4H PRN        Discharge Plan:   Network Utilization Review Department  ATTENTION: Please call with any questions or concerns to [phone] and carefully listen to the prompts so that you are directed to the right person  All voicemails are confidential   Tomasa Todd all requests for admission clinical reviews, approved or denied determinations and any other requests to dedicated fax number below belonging to the campus where the patient is receiving treatment   List of dedicated fax numbers for the Facilities:  [address] DENIALS (Administrative/Medical Necessity) [phone]   [address] (Maternity/NICU/Pediatrics) [phone]   [address]  [phone]   [address] [id number] [place] [address] [phone] [facility] [phone]

## 2022-04-07 NOTE — PLAN OF CARE
Problem: Potential for Falls  Goal: Patient will remain free of falls  Description: INTERVENTIONS:  - Educate patient/family on patient safety including physical limitations  - Instruct patient to call for assistance with activity   - Consult OT/PT to assist with strengthening/mobility   - Keep Call bell within reach  - Keep bed low and locked with side rails adjusted as appropriate  - Keep care items and personal belongings within reach  - Initiate and maintain comfort rounds  - Make Fall Risk Sign visible to staff  Outcome: Progressing     Problem: PAIN - ADULT  Goal: Verbalizes/displays adequate comfort level or baseline comfort level  Description: Interventions:  - Encourage patient to monitor pain and request assistance  - Assess pain using appropriate pain scale  - Administer analgesics based on type and severity of pain and evaluate response  - Implement non-pharmacological measures as appropriate and evaluate response  - Consider cultural and social influences on pain and pain management  - Notify physician/advanced practitioner if interventions unsuccessful or patient reports new pain  Outcome: Progressing     Problem: INFECTION - ADULT  Goal: Absence or prevention of progression during hospitalization  Description: INTERVENTIONS:  - Assess and monitor for signs and symptoms of infection  - Monitor lab/diagnostic results  - Monitor all insertion sites,  lines, tubes, and drains  - Administer medications as ordered  - Instruct and encourage patient and family to use good hand hygiene technique  -Outcome: Progressing     Problem: DISCHARGE PLANNING  Goal: Discharge to home or other facility with appropriate resources  Description: INTERVENTIONS:  - Identify barriers to discharge w/patient and caregiver  - Arrange for needed discharge resources and transportation as appropriate  - Identify discharge learning needs (meds, wound care, etc )  - Arrange for interpretive services to assist at discharge as needed  - Refer to Case Management Department for coordinating discharge planning if the patient needs post-hospital services based on physician/advanced practitioner order or complex needs related to functional status, cognitive ability, or social support system  Outcome: Progressing     Problem: Knowledge Deficit  Goal: Patient/family/caregiver demonstrates understanding of disease process, treatment plan, medications, and discharge instructions  Description: Complete learning assessment and assess knowledge base    Interventions:  - Provide teaching at level of understanding  - Provide teaching via preferred learning methods  Outcome: Progressing

## 2022-04-07 NOTE — CASE MANAGEMENT
Case Management Discharge Planning Note    Patient name Lianna Rangel  Location [address] [facility] 732/6 BWXAX 461-* MRN [id number]  : 1980 Date 2022       Current Admission Date: 3/30/2022  Current Admission Diagnosis:Cellulitis of multiple sites of trunk   Patient Active Problem List    Diagnosis Date Noted    Microcytic anemia 2022    Swelling of left lower extremity 2022    Hidradenitis suppurativa 2022    Nocturnal hypoxia 2021    Cellulitis of multiple sites of trunk 2021    Chest pain 2021    Cellulitis of left lower extremity 2021    Abscess of left thigh 2021    Difficult intubation 2021    Diabetic neuropathy ([facility] ) 2021    Morbid obesity with body mass index (BMI) of 50 0 to 59 9 in [facility]) 03/10/2021    Acute kidney injury ([facility] ) 2021    Type 1 non-ST elevation myocardial infarction (NSTEMI) ([place] [address] ) 2020    Left groin mass 2020    Type 2 diabetes mellitus with diabetic polyneuropathy, without long-term current use of insulin ([place] [address] ) 2018    Dyslipidemia 2017    Pilonidal cyst 2016    Essential hypertension 2016    Abnormal liver enzymes 2014    Coronary artery disease 2014      LOS (days): 8  Geometric Mean LOS (GMLOS) (days):   Days to GMLOS:     OBJECTIVE:  Risk of Unplanned Readmission Score: 18   Current admission status: Inpatient   Preferred Pharmacy:   [facility] [address], [phone] [address]  Phone: [phone] Fax: [phone]    Primary Care Provider: Juliet Garcia MD  Primary Insurance: [facility]  Secondary Insurance: [id number]    DISCHARGE DETAILS:  Discharge planning discussed with[de-identified] Patient  Freedom of Choice: Yes  Comments - Freedom of Choice: DME for O2; no preference and referral to [address]  CM contacted family/caregiver?: Yes  Were Treatment Team discharge recommendations reviewed with patient/caregiver?: Yes  Did patient/caregiver verbalize understanding of patient care needs?: Yes  Were patient/caregiver advised of the risks associated with not following Treatment Team discharge recommendations?: Yes    DME Referral Provided  Referral made for DME?: Yes  DME referral completed for the following items[de-identified] Home Oxygen concentrator,Portable Oxygen tanks  DME Supplier Name[de-identified] [employer]    Other Referral/Resources/Interventions Provided:  Interventions: DME    Chart reviewed and case discussed in MDR's  Patient had home o2 test and O2 is indicated at dc  Patient was offered DME choice and he has no preference and an order was sent to Kathleen Saldana in [facility] to follow

## 2022-04-07 NOTE — PLAN OF CARE
Problem: MOBILITY - ADULT  Goal: Maintain or return to baseline ADL function  Description: INTERVENTIONS:  -  Assess patient's ability to carry out ADLs; assess patient's baseline for ADL function and identify physical deficits which impact ability to perform ADLs (bathing, care of mouth/teeth, toileting, grooming, dressing, etc )  - Assess/evaluate cause of self-care deficits   - Assess range of motion  - Assess patient's mobility; develop plan if impaired  - Assess patient's need for assistive devices and provide as appropriate  - Encourage maximum independence but intervene and supervise when necessary  - Involve family in performance of ADLs  - Assess for home care needs following discharge   - Consider OT consult to assist with ADL evaluation and planning for discharge  - Provide patient education as appropriate  Outcome: Progressing  Goal: Maintains/Returns to pre admission functional level  Description: INTERVENTIONS:  - Perform BMAT or MOVE assessment daily    - Set and communicate daily mobility goal to care team and patient/family/caregiver     - Collaborate with rehabilitation services on mobility goals if consulted  - Stand patient 3 times a day  - Ambulate patient 3 times a day  - Out of bed to chair 3 times a day   - Out of bed for meals 3 times a day  - Out of bed for toileting  - Record patient progress and toleration of activity level   Outcome: Progressing     Problem: Prexisting or High Potential for Compromised Skin Integrity  Goal: Skin integrity is maintained or improved  Description: INTERVENTIONS:  - Identify patients at risk for skin breakdown  - Assess and monitor skin integrity  - Assess and monitor nutrition and hydration status  - Monitor labs   - Assess for incontinence   - Turn and reposition patient  - Assist with mobility/ambulation  - Relieve pressure over bony prominences  - Avoid friction and shearing  - Provide appropriate hygiene as needed including keeping skin clean and dry  - Evaluate need for skin moisturizer/barrier cream  - Collaborate with interdisciplinary team   - Patient/family teaching  - Consider wound care consult   Outcome: Progressing

## 2022-04-08 LAB
DME PARACHUTE DELIVERY DATE REQUESTED: NORMAL
DME PARACHUTE ITEM DESCRIPTION: NORMAL
DME PARACHUTE ORDER STATUS: NORMAL
DME PARACHUTE SUPPLIER NAME: NORMAL
DME PARACHUTE SUPPLIER PHONE: NORMAL
GLUCOSE SERPL-MCNC: 109 MG/DL (ref 65–140)
GLUCOSE SERPL-MCNC: 116 MG/DL (ref 65–140)
GLUCOSE SERPL-MCNC: 125 MG/DL (ref 65–140)
GLUCOSE SERPL-MCNC: 164 MG/DL (ref 65–140)

## 2022-04-08 PROCEDURE — 99232 SBSQ HOSP IP/OBS MODERATE 35: CPT | Performed by: STUDENT IN AN ORGANIZED HEALTH CARE EDUCATION/TRAINING PROGRAM

## 2022-04-08 PROCEDURE — 99223 1ST HOSP IP/OBS HIGH 75: CPT | Performed by: INTERNAL MEDICINE

## 2022-04-08 PROCEDURE — 99232 SBSQ HOSP IP/OBS MODERATE 35: CPT | Performed by: INTERNAL MEDICINE

## 2022-04-08 PROCEDURE — 10061 I&D ABSCESS COMP/MULTIPLE: CPT | Performed by: PHYSICIAN ASSISTANT

## 2022-04-08 PROCEDURE — 82948 REAGENT STRIP/BLOOD GLUCOSE: CPT

## 2022-04-08 PROCEDURE — 99232 SBSQ HOSP IP/OBS MODERATE 35: CPT | Performed by: PHYSICIAN ASSISTANT

## 2022-04-08 PROCEDURE — 0J980ZZ DRAINAGE OF ABDOMEN SUBCUTANEOUS TISSUE AND FASCIA, OPEN APPROACH: ICD-10-PCS | Performed by: SURGERY

## 2022-04-08 RX ORDER — LIDOCAINE HYDROCHLORIDE 10 MG/ML
10 INJECTION, SOLUTION EPIDURAL; INFILTRATION; INTRACAUDAL; PERINEURAL ONCE
Status: COMPLETED | OUTPATIENT
Start: 2022-04-08 | End: 2022-04-08

## 2022-04-08 RX ADMIN — INSULIN LISPRO 2 UNITS: 100 INJECTION, SOLUTION INTRAVENOUS; SUBCUTANEOUS at 12:22

## 2022-04-08 RX ADMIN — CEFAZOLIN SODIUM 2000 MG: 2 SOLUTION INTRAVENOUS at 04:38

## 2022-04-08 RX ADMIN — OXYCODONE HYDROCHLORIDE AND ACETAMINOPHEN 1 TABLET: 5; 325 TABLET ORAL at 06:04

## 2022-04-08 RX ADMIN — ZINC SULFATE 220 MG (50 MG) CAPSULE 220 MG: CAPSULE at 09:00

## 2022-04-08 RX ADMIN — AMLODIPINE BESYLATE 10 MG: 10 TABLET ORAL at 09:00

## 2022-04-08 RX ADMIN — HYDROMORPHONE HYDROCHLORIDE 1 MG: 1 INJECTION, SOLUTION INTRAMUSCULAR; INTRAVENOUS; SUBCUTANEOUS at 09:00

## 2022-04-08 RX ADMIN — HYDROMORPHONE HYDROCHLORIDE 1 MG: 1 INJECTION, SOLUTION INTRAMUSCULAR; INTRAVENOUS; SUBCUTANEOUS at 19:52

## 2022-04-08 RX ADMIN — CARVEDILOL 6.25 MG: 6.25 TABLET, FILM COATED ORAL at 09:03

## 2022-04-08 RX ADMIN — FAMOTIDINE 20 MG: 20 TABLET, FILM COATED ORAL at 17:48

## 2022-04-08 RX ADMIN — MORPHINE SULFATE 5 MG: 2 INJECTION, SOLUTION INTRAMUSCULAR; INTRAVENOUS at 21:31

## 2022-04-08 RX ADMIN — HYDROMORPHONE HYDROCHLORIDE 1 MG: 1 INJECTION, SOLUTION INTRAMUSCULAR; INTRAVENOUS; SUBCUTANEOUS at 14:57

## 2022-04-08 RX ADMIN — CARVEDILOL 6.25 MG: 6.25 TABLET, FILM COATED ORAL at 17:48

## 2022-04-08 RX ADMIN — OXYCODONE HYDROCHLORIDE AND ACETAMINOPHEN 1 TABLET: 5; 325 TABLET ORAL at 12:22

## 2022-04-08 RX ADMIN — OXYCODONE HYDROCHLORIDE AND ACETAMINOPHEN 1 TABLET: 5; 325 TABLET ORAL at 00:22

## 2022-04-08 RX ADMIN — FAMOTIDINE 20 MG: 20 TABLET, FILM COATED ORAL at 09:00

## 2022-04-08 RX ADMIN — CEFAZOLIN SODIUM 2000 MG: 2 SOLUTION INTRAVENOUS at 12:22

## 2022-04-08 RX ADMIN — OXYCODONE HYDROCHLORIDE AND ACETAMINOPHEN 1 TABLET: 5; 325 TABLET ORAL at 17:48

## 2022-04-08 RX ADMIN — LISINOPRIL 10 MG: 10 TABLET ORAL at 09:00

## 2022-04-08 RX ADMIN — ENOXAPARIN SODIUM 60 MG: 60 INJECTION SUBCUTANEOUS at 09:00

## 2022-04-08 RX ADMIN — LIDOCAINE HYDROCHLORIDE 10 ML: 10 INJECTION, SOLUTION EPIDURAL; INFILTRATION; INTRACAUDAL at 15:30

## 2022-04-08 RX ADMIN — HYDROMORPHONE HYDROCHLORIDE 1 MG: 1 INJECTION, SOLUTION INTRAMUSCULAR; INTRAVENOUS; SUBCUTANEOUS at 04:38

## 2022-04-08 RX ADMIN — ASPIRIN 81 MG: 81 TABLET, COATED ORAL at 09:00

## 2022-04-08 RX ADMIN — CEFAZOLIN SODIUM 2000 MG: 2 SOLUTION INTRAVENOUS at 17:49

## 2022-04-08 RX ADMIN — ATORVASTATIN CALCIUM 80 MG: 80 TABLET, FILM COATED ORAL at 17:48

## 2022-04-08 RX ADMIN — Medication 6 MG: at 21:34

## 2022-04-08 NOTE — PROGRESS NOTES
[facility] Progress Note - Jethro Corbett 37 y o  male MRN: [id number]    Unit/Bed#: [address] [place] [address] Encounter: [id number]      Assessment/Plan:  * Cellulitis of multiple sites of trunk  Assessment & Plan    Acute    Patient with a history of multiple sites of cellulitis with last admission in 12/2021  Presented with cellulitis of abdomen and pannus  Patient also has wound with some drainage  On previous admission, patient was started on Ancef, 2 g, IV, Q8H and discharged on Keflex  Wound cultures grew beta-hemolytic strep and Staphylococcus haemolyticus which was susceptible to vancomycin  Was given Vancomycin in the ED  Patient was placed on Vancomycin and Ancef with limited response  Was switched to clindamycin on 4/3  Was switch to cefazolin on 4/4  CT AP (3/30): No discrete intra-abdominal or pelvic abscess  Skin thickening and inflammatory stranding in the lower anterior abdominal wall pannus suggestive of cellulitis  Wound Culture (3/30): 1+ Gram positive cocci in pairs, 1+ Polys    · Pt currently on cefazolin and will be transitioned to keflex prior to discharge  · Cellulitis greatly improved   · Abdominal pads to maintain area under pannus dry     Essential hypertension  Assessment & Plan    Chronic     BP's on admission were in the 180's/90's  · Continue home medications  · Amlodipine (NORVASC) 10 mg, PO, Daily   · Carvedilol (COREG) 6 25 mg, PO, BID  · Lisinopril (ZESTRIL) 10 mg, PO, Daily   · Monitor BP     Type 2 diabetes mellitus with diabetic polyneuropathy, without long-term current use of insulin (HCC)  Assessment & Plan    Chronic, stable    Patient HbA1c in 11/2021 was 7 0  Repeat HbA1c on admission was 7 5  Serum Glucose have been stable, mostly in the mid 100's       · Hold home medications at this time   · Metformin (GLUCOPHAGE) 1000 mg, PO, BID with Meals     · Glimepiride (AMARYL) 4 mg, PO, Daily with breakfast   · ISS   · Monitor serum glucose    · Monitor for basal and meal time insuline requirement IP    Type 1 non-ST elevation myocardial infarction (NSTEMI) [facility])  Assessment & Plan    Per history    Patient has a history of MI with stent placement in 2001  Patient follows with Dr Fidelia Jarvis outpatient  · Continue home medications  · Carvedilol "COREG" 6 25 mg, PO, BID     Microcytic anemia  Assessment & Plan    Chronic, Stable     Patient presented with low hemoglobin of 9 8 on admission  Baseline Hgb is high 8's - 9's  Has been anemic since 2/2021  Hgb trend: 9 8 > 9 0 > 9 1 > 9 3 > 8 8 >>9 5       · Trend hemoglobin   · Transfuse if Hgb < 7 0   · CBC AM     Hidradenitis suppurativa  Assessment & Plan    Chronic, Stable     Patient has history of hidranitis suppurativa with wounds currently present on left groin and abdominal pannus  · Wound cultures- Hemolytic strep group C and G   Staph aureus  · Stable Daily dressings   · Continue antibiotics as noted above  Swelling of left lower extremity  Assessment & Plan    Chronic     Patient with a history of hidradenitis suppurativa on bilateral groin and thigh  Patient previously had cyst removed from his left thigh which required anesthesia  He has been following up with wound care weekly  Per patient, he is now to see them every 3 weeks  On PE, his left lower extremity circumference is significantly increased compared to right  States that this is chronic problem and actually looks better than usual  Also has history of lymphedema on left LE  VAS LL Venous duplex (3/30): RLL LIMITED: No evidence of thrombus in the common femoral vein  Normal response to augmentation maneuvers  LLL: No evidence of acute or chronic DVT  No evidence of superficial thrombophlebitis noted  Normal response to augmentation maneuvers  Popliteal, posterior tibial and anterior tibial arterial Doppler waveforms are triphasic         · Enoxaparin (LOVENOX) 60 mg, SC, Daily   · Continue to monitor    Nocturnal hypoxia  Assessment & Plan    Chronic    SpO2 on admission was 99% on RA  Patient has history of nocturnal hypoxemia  In 12/2021, patient had overnight O2 evaluation which showed significant hypoxemia with lowest desaturation of 59%  At that time, patient was advised to follow-up with pulmonology for complete sleep study  Patient has been saturating consistently in the low 80's at HS  Currently on 4 L of oxygen per NC  Home O2 Sleep Study (4/3): Baseline SpO2 at rest was 89%, during exertion is 85%, on O2 at rest is 92% at 4 L/min, and during exertion with O2 is 92% at 6 L/min  [place] Sleepiness Scale (4/5): 17/24    · Continue oxygen therapy   · Titrate oxygen as needed at HS   · Case management for home oxygen   · Needs polysomnography done OP  · Follow up with pulmonology at discharge          Morbid obesity with body mass index (BMI) of 50 0 to 59 9 in adult [facility])  Assessment & Plan    Chronic    Patient last estimated body mass index was 59 52 kg/m² on 3/7/2022  Has multiple chronic comorbid conditions  · Manage comorbid conditions   · Consider weight management OP         Coronary artery disease  Assessment & Plan    Chronic     Patient with a history of cardiac catheterization in 2010 and 2020  Follows with cardiology outpatient  Denies any chest pain and shortness of breath on admission  Patient states not taking ticagrelor (BRILINTA) 90 mg, PO, Q12H  · Continue home medications  · Carvedilol (COREG) 6 25 mg, PO, BID  · Amlodipine (NORVASC) 10 mg, PO, BID  · Aspirin 81 mg, PO, Daily         Subjective:   Pt seen and examined at bedside  Feels much better today  Pain has significantly improved  Would like surgical team to consider incision and drainage of the wound on his pannus  Denies any fevers, chills, shortness of breath, nausea, vomiting, diarrhea      Objective:     Vitals: Blood pressure 155/78, pulse 56, temperature 97 5 °F (36 4 °C), resp  rate 18, SpO2 94 %  ,There is no height or weight on file to calculate BMI    Wt Readings from Last 3 Encounters:   03/07/22 (!) 172 kg (380 lb)   12/02/21 (!) 177 kg (390 lb 10 5 oz)   11/15/21 (!) 176 kg (388 lb)     No intake or output data in the 24 hours ending 04/08/22 1400    Physical Exam: /78   Pulse 56   Temp 97 5 °F (36 4 °C)   Resp 18   SpO2 94%   General appearance: alert and oriented, in no acute distress  Lungs: clear to auscultation bilaterally  Heart: regular rate and rhythm, S1, S2 normal, no murmur, click, rub or gallop  Abdomen: soft, non-tender; bowel sounds normal; no masses,  no organomegaly  Extremities: extremities normal, warm and well-perfused; no cyanosis, clubbing, or edema     Recent Results (from the past 24 hour(s))   Home O2 Setup    Collection Time: 04/07/22  2:54 PM   Result Value Ref Range    Supplier Name [employer]     Supplier Phone Number [phone]     Order Status Processing     Delivery Note      Delivery Request Date 04/07/2022     Item Description       Home Oxygen Concentrator with Portability, Adult, High Liter Flow (Over 4LPM)    Item Description       Portable Gaseous Oxygen System, High Liter Flow (Over 4LPM)    Item Description Portable O2 Contents, Gas     Item Description Portable Tank with Conserving Device / Pulse Dose     Item Description O2 Humidifier Bottle, High Liter Flow    Fingerstick Glucose (POCT)    Collection Time: 04/07/22  4:41 PM   Result Value Ref Range    POC Glucose 107 65 - 140 mg/dl   Fingerstick Glucose (POCT)    Collection Time: 04/07/22  8:41 PM   Result Value Ref Range    POC Glucose 116 65 - 140 mg/dl   Fingerstick Glucose (POCT)    Collection Time: 04/08/22  7:35 AM   Result Value Ref Range    POC Glucose 109 65 - 140 mg/dl   Fingerstick Glucose (POCT)    Collection Time: 04/08/22 11:59 AM   Result Value Ref Range    POC Glucose 164 (H) 65 - 140 mg/dl       Current Facility-Administered Medications   Medication Dose Route Frequency Provider Last Rate Last Admin    acetaminophen (TYLENOL) tablet 650 mg  650 mg Oral Q6H PRN Paralee Jeremie, DO        albuterol (PROVENTIL HFA,VENTOLIN HFA) inhaler 2 puff  2 puff Inhalation Q6H PRN Paralee Jeremie, DO        amLODIPine (NORVASC) tablet 10 mg  10 mg Oral Daily Paralee Jeremie, DO   10 mg at 04/08/22 0900    aspirin (ECOTRIN LOW STRENGTH) EC tablet 81 mg  81 mg Oral Daily Cata Denny, DO   81 mg at 04/08/22 0900    atorvastatin (LIPITOR) tablet 80 mg  80 mg Oral Daily [facility], DO   80 mg at 04/07/22 1706    carvedilol (COREG) tablet 6 25 mg  6 25 mg Oral BID With Meals Paralee Jeremie, DO   6 25 mg at 04/08/22 3573    ceFAZolin (ANCEF) IVPB (premix in dextrose) 2,000 mg 50 mL  2,000 mg Intravenous Q6H Jewell Purcell PA-C 100 mL/hr at 04/08/22 1222 2,000 mg at 04/08/22 1222    enoxaparin (LOVENOX) subcutaneous injection 60 mg  60 mg Subcutaneous Daily Christian Mars MD   60 mg at 04/08/22 0900    famotidine (PEPCID) tablet 20 mg  20 mg Oral BID Esperanza Robles MD   20 mg at 04/08/22 0900    HYDROmorphone (DILAUDID) injection 1 mg  1 mg Intravenous Q4H PRN Esperanza Robles MD   1 mg at 04/08/22 0900    insulin lispro (HumaLOG) 100 units/mL subcutaneous injection 2-12 Units  2-12 Units Subcutaneous 4x Daily (AC & HS) Paralee Jeremie, DO   2 Units at 04/08/22 1222    lidocaine (PF) (XYLOCAINE-MPF) 1 % injection 10 mL  10 mL Infiltration Once Clementine Yen PA-C        lisinopril (ZESTRIL) tablet 10 mg  10 mg Oral Daily Cataanya Denny, DO   10 mg at 04/08/22 0900    melatonin tablet 6 mg  6 mg Oral HS Paralee Jeremie, DO   6 mg at 04/07/22 2243    oxyCODONE-acetaminophen (PERCOCET) 5-325 mg per tablet 1 tablet  1 tablet Oral Q6H Danielbury, DO   1 tablet at 04/08/22 1222    zinc sulfate (ZINCATE) capsule 220 mg  220 mg Oral Daily Frank Baig MD   220 mg at 04/08/22 0900       Invasive Devices  Report    Peripheral Intravenous Line            Peripheral IV 04/05/22 Right Arm 3 days                Lab, Imaging and other studies: I have personally reviewed pertinent reports      VTE Pharmacologic Prophylaxis: Enoxaparin (Lovenox)  VTE Mechanical Prophylaxis: sequential compression device    Kandice Mulligan DO

## 2022-04-08 NOTE — CASE MANAGEMENT
Case Management Discharge Planning Note    Patient name Raymond Prajapati  Location [address] 002-* MRN [id number]  : 1980 Date 2022       Current Admission Date: 3/30/2022  Current Admission Diagnosis:Cellulitis of multiple sites of trunk   Patient Active Problem List    Diagnosis Date Noted    Microcytic anemia 2022    Swelling of left lower extremity 2022    Hidradenitis suppurativa 2022    Nocturnal hypoxia 2021    Cellulitis of multiple sites of trunk 2021    Chest pain 2021    Cellulitis of left lower extremity 2021    Abscess of left thigh 2021    Difficult intubation 2021    Diabetic neuropathy ([facility] ) 2021    Morbid obesity with body mass index (BMI) of 50 0 to 59 9 in [facility]) 03/10/2021    Acute kidney injury ([facility] ) 2021    Type 1 non-ST elevation myocardial infarction (NSTEMI) ([place] [address] ) 2020    Left groin mass 2020    Type 2 diabetes mellitus with diabetic polyneuropathy, without long-term current use of insulin ([place] [address] ) 2018    Dyslipidemia 2017    Pilonidal cyst 2016    Essential hypertension 2016    Abnormal liver enzymes 2014    Coronary artery disease 2014      LOS (days): 9  Geometric Mean LOS (GMLOS) (days):   Days to GMLOS:     OBJECTIVE:  Risk of Unplanned Readmission Score: 18   Current admission status: Inpatient   Preferred Pharmacy:   [facility] [address], [phone] [address]  Phone: [phone] Fax: [phone]  Primary Care Provider: Noam Staples MD  Primary Insurance: Mobile  Secondary Insurance: [id number]    DISCHARGE DETAILS:    CM spoke with Adriane Delgado at [facility] with [address] [place] [address] & [facility] [address]  They state that diagnosis did not qualify patient for home O2  Dr Trudi Garcia and Pulmonary are aware   Patient had an overnight oximetry done as well which shows patient needs O2/bipap at night but not all qualifying testing was done  Home O2 protocol was last performed on 4/3 and is not acceptable now  CM requested Dr Stacy Rascon order a repeat study  Craifg in respiratory is aware and will see patient next for repeat study  [facility] e-mailed [phone] [facility] [employer] to see if any portion of the first month of the O2 could be covered under [facility]  CM also e-mailed [facility] for advise  CM to follow

## 2022-04-08 NOTE — RESPIRATORY THERAPY NOTE
Home Oxygen Qualifying Test     Patient name: Alva Velásquez        : 1980   Date of Test:  2022  Diagnosis: Cellulitis of  Multiple sites of trunk   Home Oxygen Test:    **Medicare Guidelines require item(s) 1-5 on all ambulatory patients or 1 and 2 on non-ambulatory patients  1  Baseline SPO2 on Room Air at rest 93 %   a  If <= 88% on Room Air add O2 via NC to obtain SpO2 >=88%  If LPM needed, document LPM NA needed to reach =>88%    2  SPO2 during exertion on Room Air 87 %  a  During exertion monitor SPO2  If SPO2 increases >=89%, do not add supplemental oxygen    3  SPO2 on Oxygen at Rest 95 % at 2 LPM    4  SPO2 during exertion on Oxygen 91 % at 2 LPM    5  Test performed during exertion activity  [x]  Supplemental Home Oxygen is indicated  []  Client does not qualify for home oxygen  Respiratory Additional Notes-     PT walked approximately 100 feet on room air and SPO2 decreased to 87%  Placed on 2 lpm NC and walked PT again approximately 100 feet and SPO2 did not go below 91%       Tamika Brown, RT

## 2022-04-08 NOTE — PROCEDURES
Incision and drain    Date/Time: 4/8/2022 3:00 PM  Performed by: Shanice Moore PA-C  Authorized by: Shanice Moore PA-C   Universal Protocol:  Procedure performed by: Levon BARRERA)  Consent: Verbal consent obtained  Written consent not obtained  Risks and benefits: risks, benefits and alternatives were discussed  Consent given by: patient  Time out: Immediately prior to procedure a "time out" was called to verify the correct patient, procedure, equipment, support staff and site/side marked as required  Timeout called at: 4/8/2022 3:00 PM   Patient understanding: patient states understanding of the procedure being performed  Patient consent: the patient's understanding of the procedure matches consent given  Procedure consent: procedure consent matches procedure scheduled  Relevant documents: relevant documents present and verified  Test results: test results available and properly labeled  Site marked: the operative site was marked  Radiology Images displayed and confirmed  If images not available, report reviewed: imaging studies available  Required items: required blood products, implants, devices, and special equipment available  Patient identity confirmed: verbally with patient and arm band      Unsuccessful Attempt: unsuccessful attempt    Patient location:  Bedside  Location:     Indications for incision and drainage: induration firm pannus  Location:  Trunk    Trunk location:  Abdomen  Pre-procedure details:     Skin preparation:  Betadine  Anesthesia (see MAR for exact dosages):      Anesthesia method:  Local infiltration    Local anesthetic:  Lidocaine 1% w/o epi (4cc)  Procedure details:     Complexity:  Simple    Needle aspiration: yes      Needle size:  18 G    Incision types:  Stab incision    Aspiration type: puncture aspiration      Scalpel blade:  11    Approach:  Open and puncture    Incision depth:  Subcutaneous    Wound management:  Probed and deloculated    Drainage:  Bloody and serous Drainage amount:  Scant    Wound treatment:  Wound left open    Packing materials:  1/2 in iodoform gauze    Amount 1/2" iodoform:  3  Post-procedure details:     Patient tolerance of procedure:   Tolerated well, no immediate complications            With diagnostic needle aspiration and stab incision or performed overlying this area indurated edematous pannus

## 2022-04-08 NOTE — CASE MANAGEMENT
Case Management Discharge Planning Note    Patient name Sky Candelario  Location [address] [facility] 670/0 PSWKJ 004-* MRN [id number]  : 1980 Date 2022       Current Admission Date: 3/30/2022  Current Admission Diagnosis:Cellulitis of multiple sites of trunk   Patient Active Problem List    Diagnosis Date Noted    Microcytic anemia 2022    Swelling of left lower extremity 2022    Hidradenitis suppurativa 2022    Nocturnal hypoxia 2021    Cellulitis of multiple sites of trunk 2021    Chest pain 2021    Cellulitis of left lower extremity 2021    Abscess of left thigh 2021    Difficult intubation 2021    Diabetic neuropathy ([facility] ) 2021    Morbid obesity with body mass index (BMI) of 50 0 to 59 9 in [facility]) 03/10/2021    Acute kidney injury ([facility] ) 2021    Type 1 non-ST elevation myocardial infarction (NSTEMI) ([place] [address] ) 2020    Left groin mass 2020    Type 2 diabetes mellitus with diabetic polyneuropathy, without long-term current use of insulin ([place] [address] ) 2018    Dyslipidemia 2017    Pilonidal cyst 2016    Essential hypertension 2016    Abnormal liver enzymes 2014    Coronary artery disease 2014      LOS (days): 9  Geometric Mean LOS (GMLOS) (days):   Days to GMLOS:     OBJECTIVE:  Risk of Unplanned Readmission Score: 18   Current admission status: Inpatient   Preferred Pharmacy:   [address] [facility], [phone]  Phone: [phone] Fax: [phone]    Primary Care Provider: Darryle Mesi, MD  Primary Insurance: Mayra Best  Secondary Insurance: [id number]    DISCHARGE DETAILS:    CM received call back from [employer] with [employer] and patient does not have a qualifying diagnosis for home O2 and will have to pay OOP       OOP cost:  Home Concentrator (only): $115/month  Home Concentrator w/Portable Tank: $165/month    She states she will have staff call and discuss with the patient  Home O2 order still needs to be signed and Dr Keesha Carmona is aware and will sign  She was also made aware there is no qualifying diagnosis  CM to follow and speak with patient once case is reviewed by Dr Keesha Carmona

## 2022-04-08 NOTE — PROGRESS NOTES
Progress Note - General Surgery   Relda Naz 37 y o  male MRN: [id number]  Unit/Bed#: [address] [place] [address] Encounter: [id number]    Assessment:  1) Cellulitis Abdominal Wall/Pannus - erythema present, edema and swelling present, relatively soft to palpation, minimal tenderness, no leukocytosis, other labs within normal limits, AVSS, with deep palpation of left side of pannus does have small amount of seropurulent secretion  2) LLE swelling and erythema -  chronic lymphedema, no significant tenderness, swelling directly subsequent due to postsurgical recovery and resection of thigh mass  3) Granulation medial thigh wound -  healing slowly  4) Morbidly Obese - BMI 59 52  5) Type 2 DM non insulin dependent - hemoglobin A1c 7 5    Plan:  1-5)   - plan for bedside incision and drainage to explore area of expressed serial purulence  - order 10 cc lidocaine  - verbal consent  - discussed and educated patient  - continue antibiotics per Medicine team  - tight glucose control  - continue pursuing bariatric surgery  - continue local wound care on left medial thigh  - continue follow-up with lymphedema clinic  - rest, elevation, compression  - weight loss needs to continue  - continue smoking cessation    Subjective/Objective   Chief Complaint:  I have been trying to tell him for a while now that this needs to be drained    Subjective:  Patient was seen examined at bedside  Patient denies any acute events overnight  Patient denies any fevers or chills  Patient does have some slight tenderness overlying the left side of the pannus  Patient knows that he has panniculitis  Patient reports that there is this area of firmness that he thinks needs to be drained  Patient reports that he has had this happen multiple times with drainage that this gets better and heels better  Patient does have some slight sero purulence expressed  Patient was educated on the fact that nothing was found with diagnostic needle aspiration  Patient was also educated that the firmness can be secondary to 2 edema and nonlocalized/non consolidated abscess  As of current appears that patient still would like to pursue repeat exploration with incision and drainage due to the fact that is still draining small amounts that it is tender overlying that region  Patient thinks that it is changed since antibiotic administration  Objective:     Blood pressure 155/78, pulse 56, temperature 97 5 °F (36 4 °C), resp  rate 18, SpO2 94 %  ,There is no height or weight on file to calculate BMI  No intake or output data in the 24 hours ending 04/08/22 1338    Invasive Devices  Report    Peripheral Intravenous Line            Peripheral IV 04/05/22 Right Arm 2 days                Physical Exam: /78   Pulse 56   Temp 97 5 °F (36 4 °C)   Resp 18   SpO2 94%   General appearance: alert and oriented, in no acute distress  Head: Normocephalic, without obvious abnormality, atraumatic  Extremities: Substantial lymphedema left lower extremity  Skin: Surrounding pannus region is erythematous and swollen/edematous, left lower pannus does have area of increased firmness without any fluctuance, deep palpation expresses seropurulent secretion    Lab, Imaging and other studies:I have personally reviewed pertinent lab results    , VTE Pharmacologic Prophylaxis: Enoxaparin (Lovenox)  VTE Mechanical Prophylaxis: sequential compression device

## 2022-04-08 NOTE — CASE MANAGEMENT
Case Management Discharge Planning Note    Patient name Sunita Gloria  Location [address] 333/6 AAZXH 798-* MRN [id number]  : 1980 Date 2022       Current Admission Date: 3/30/2022  Current Admission Diagnosis:Cellulitis of multiple sites of trunk   Patient Active Problem List    Diagnosis Date Noted    Microcytic anemia 2022    Swelling of left lower extremity 2022    Hidradenitis suppurativa 2022    Nocturnal hypoxia 2021    Cellulitis of multiple sites of trunk 2021    Chest pain 2021    Cellulitis of left lower extremity 2021    Abscess of left thigh 2021    Difficult intubation 2021    Diabetic neuropathy ([facility] ) 2021    Morbid obesity with body mass index (BMI) of 50 0 to 59 9 in [facility]) 03/10/2021    Acute kidney injury ([place] [address] ) 2021    Type 1 non-ST elevation myocardial infarction (NSTEMI) ([place] [address] ) 2020    Left groin mass 2020    Type 2 diabetes mellitus with diabetic polyneuropathy, without long-term current use of insulin ([place] [address] ) 2018    Dyslipidemia 2017    Pilonidal cyst 2016    Essential hypertension 2016    Abnormal liver enzymes 2014    Coronary artery disease 2014      LOS (days): 9  Geometric Mean LOS (GMLOS) (days):   Days to GMLOS:     OBJECTIVE:  Risk of Unplanned Readmission Score: 18   Current admission status: Inpatient   Preferred Pharmacy:   [facility] [address], [phone] [address]  Phone: [phone] Fax: [phone]    Primary Care Provider: Douglas Nguyễn MD  Primary Insurance: [facility]  [facility] Insurance: [facility]    DISCHARGE DETAILS:  Discharge planning discussed with[de-identified] Patient  Freedom of Choice: Yes     CM contacted family/caregiver?: No- see comments (Patient requests CM not call family )  Were Treatment Team discharge recommendations reviewed with patient/caregiver?: Yes  Did patient/caregiver verbalize understanding of patient care needs?: Yes  Were patient/caregiver advised of the risks associated with not following Treatment Team discharge recommendations?: Yes    [address]ed [address]         Is the patient interested in [facility] at discharge?: No    DME Referral Provided  Referral made for DME?: Yes  DME referral completed for the following items[de-identified] Home Oxygen concentrator,Portable Oxygen tanks  DME Supplier Name[de-identified] [employer]    Other Referral/Resources/Interventions Provided:  Interventions: DME    Would you like to participate in our [address] service program?  : No - Declined    Treatment Team Recommendation: Home  Discharge Destination Plan[de-identified] Home  Transport at Discharge : Self,Automobile     CM met with patient and discussed dc planning and the need for home o2 at dc and safety issues if he does not use it at home  The OOP cost was reviewed with him  He states he cannot afford to pay OOP  He also states he works FT as a  and he will not use O2 while at work even if he gets this  CM spoke with Dr Leonel Peters regarding the above and a pulmonary consult will be placed  Patient states his care is in the parking lot and he will transport himself home at dc  CM to follow

## 2022-04-08 NOTE — CONSULTS
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   No evidence of emphysematous or interstitial lung disease changes on imaging  Check complete PFTs with MIP/MEP and ABG  Continued complete tobacco cessation  Morbid obesity with hypoventilation and nocturnal hypoxia   Outpatient PSG  Outpatient follow-up as per discharge instructions  Discussed with primary team     History of Present Illness   Physician Requesting Consult: Mignon Chambers DO  Reason for Consult / Principal Problem:  Nocturnal hypoxia nocturnal hypoxia  Hx and PE limited by:  None  Chief Complaint:  I feel fine    HPI: Abel Mathew is a 37 y o   male who presented to [address] [place] [address] with complaints of lower abdominal pain with associated fever, chills, body aches  Patient was found to have cellulitis of the trunk with underlying hidradenitis suppurative with left groin wound  He was admitted for same and given a course of antibiotics  During his hospitalization, he has required supplemental oxygen and pulmonary consultation was sought today due to need for home supplemental oxygen  At present, Gabe Reinoso is lying flat in bed  He reports that he has not been very mobile due to leg and abdominal pain  He denies any shortness of breath at present toward baseline  He denies any associated cough, chest pain, or wheezing    He has never been diagnosed with underlying lung disease  He reports he has had sleep studies in the past when the hospital and has not been found to have sleep apnea  He has never required any inhalers  He works as a  and also has a significant tobacco abuse history as below  He lives at home with his son  Inpatient consult to Pulmonology  Consult performed by: PETRA Flores  Consult ordered by: Maury Gore DO        Review of Systems   All other systems reviewed and are negative  A full 12-point review of systems was completed and is negative except for those outlined in the HPI  Historical Information   Past Medical History:   Diagnosis Date    Arthritis     Breathing difficulty     Coronary artery disease     Diabetes [facility]     Heart disease     CAD   s/p ptca with 1 stent 2012    Hidradenitis suppurativa     groin    History of transfusion     Hyperlipidemia     Hypertension     MI (myocardial infarction) ([facility] )     " silent " M I  in the past    Morbid obesity with BMI of 50 0-59 9, adult [facility]     Nicotine dependence     Obesity     Pilonidal cyst      Past Surgical History:   Procedure Laterality Date    CARDIAC SURGERY      CORONARY ANGIOPLASTY WITH STENT PLACEMENT      INCISION AND DRAINAGE OF WOUND N/A 1/24/2017    Procedure: INCISION AND DRAINAGE (I&D) BUTTOCK, PILONIDAL CYST;  Surgeon: Julio Gallegos MD;  Location: [address] [place] [address];  Service:    [place] LEG SURGERY Left     I&D of left leg 2012    WI DEBRIDEMENT, SKIN, SUB-Q TISSUE,=<20 SQ CM Left 7/6/2021    Procedure: DEBRIDEMENT WOUND [facility] OUT);   Surgeon: Bryan Page MD;  [facility];  Service: General    WI EXC SKIN BENIG >4 CM TRUNK,ARM,LEG Left 4/6/2021    Procedure: EXCISION THIGH MASS;  Surgeon: Bryan Page MD;  [facility];  Service: General    WI EXC SWEAT GLAND Geovanna Speak Left 7/6/2021    Procedure: EXCISION PERINEAL ABSCESS LEFT THIGH;  Surgeon: Bryan Page MD;  [facility];  Service: General  MI NEG PRESS WOUND TX, < 50 CM Left 2021    Procedure: APPLICATION VAC DRESSING THIGH;  Surgeon: Sue Ibarra MD;  [facility];  Service: General    WOUND DEBRIDEMENT Left 2021    Procedure: DEBRIDEMENT WOUND AND DRESSING CHANGE [facility] OUT); Surgeon: Ingrid Albrecht DO;  [facility];  Service: General    WOUND DEBRIDEMENT Left 2021    Procedure: DEBRIDEMENT LOWER EXTREMITY [facility] OUT), left groin and thigh;  Surgeon: Sue Ibarra MD;  [facility];  Service: General    WOUND DEBRIDEMENT Left 2021    Procedure: DEBRIDEMENT LOWER EXTREMITY ([facility]); Surgeon: Macrina Mayorga DO;  [facility];  Service: General    WOUND DEBRIDEMENT Left 2021    Procedure: Washout left thigh wound and closure;  Surgeon: Juan Escobar DO;  [facility];  Service: General     Social History   Social History     Substance and Sexual Activity   Alcohol Use Not Currently    Comment: rarely     Social History     Substance and Sexual Activity   Drug Use No     Social History     Tobacco Use   Smoking Status Former Smoker    Packs/day: 1 50    Years: 20 00    Pack years: 30 00    Types: Cigarettes    Start date:    [employer] Quit date: 2021    Years since quittin 1   Smokeless Tobacco Never Used     E-Cigarette/Vaping    E-Cigarette Use Never User      E-Cigarette/Vaping Substances    Nicotine No     THC No     CBD No     Flavoring No      Occupational History:  Works as a       Family History:   Family History   Problem Relation Age of Onset    Heart disease Father     Diabetes Father     Hypertension Mother     Heart disease Mother        Meds/Allergies   all current active meds have been reviewed, pertinent pulmonary meds have been reviewed, current meds:   Current Facility-Administered Medications   Medication Dose Route Frequency    acetaminophen (TYLENOL) tablet 650 mg  650 mg Oral Q6H PRN    albuterol (PROVENTIL HFA,VENTOLIN HFA) inhaler 2 puff  2 puff Inhalation Q6H PRN    amLODIPine (NORVASC) tablet 10 mg  10 mg Oral Daily    aspirin (ECOTRIN LOW STRENGTH) EC tablet 81 mg  81 mg Oral Daily    atorvastatin (LIPITOR) tablet 80 mg  80 mg Oral Daily With Dinner    carvedilol (COREG) tablet 6 25 mg  6 25 mg Oral BID With Meals    ceFAZolin (ANCEF) IVPB (premix in dextrose) 2,000 mg 50 mL  2,000 mg Intravenous Q6H    enoxaparin (LOVENOX) subcutaneous injection 60 mg  60 mg Subcutaneous Daily    famotidine (PEPCID) tablet 20 mg  20 mg Oral BID    HYDROmorphone (DILAUDID) injection 1 mg  1 mg Intravenous Q4H PRN    insulin lispro (HumaLOG) 100 units/mL subcutaneous injection 2-12 Units  2-12 Units Subcutaneous 4x Daily (AC & HS)    lisinopril (ZESTRIL) tablet 10 mg  10 mg Oral Daily    melatonin tablet 6 mg  6 mg Oral HS    oxyCODONE-acetaminophen (PERCOCET) 5-325 mg per tablet 1 tablet  1 tablet Oral Q6H [facility]    zinc sulfate (ZINCATE) capsule 220 mg  220 mg Oral Daily    and PTA meds:   Prior to Admission Medications   Prescriptions Last Dose Informant Patient Reported? Taking?    Omega-3 Fatty Acids (fish oil) 1,000 mg 3/29/2022 at Unknown time  No Yes   Sig: Take 1 capsule (1,000 mg total) by mouth 2 (two) times a day   albuterol (ProAir HFA) 90 mcg/act inhaler Unknown at Unknown time  No No   Sig: Inhale 2 puffs every 6 (six) hours as needed for wheezing   amLODIPine (NORVASC) 10 mg tablet 3/29/2022 at Unknown time  No Yes   Sig: Take 1 tablet by mouth once daily   aspirin (ECOTRIN LOW STRENGTH) 81 mg EC tablet 3/29/2022 at Unknown time  No Yes   Sig: Take 1 tablet (81 mg total) by mouth daily   atorvastatin (LIPITOR) 80 mg tablet 3/29/2022 at Unknown time  No Yes   Sig: Take 1 tablet (80 mg total) by mouth daily   carvedilol (COREG) 6 25 mg tablet 3/29/2022 at Unknown time  No Yes   Sig: Take 1 tablet (6 25 mg total) by mouth 2 (two) times a day with meals   glimepiride (AMARYL) 4 mg tablet 3/29/2022 at Unknown time  No Yes   Sig: Take 1 tablet (4 mg total) by mouth daily with breakfast   lisinopril (ZESTRIL) 10 mg tablet 3/29/2022 at Unknown time  No Yes   Sig: Take 1 tablet (10 mg total) by mouth daily   metFORMIN (GLUCOPHAGE) 1000 MG tablet 3/29/2022 at Unknown time  No Yes   Sig: Take 1 tablet (1,000 mg total) by mouth 2 (two) times a day with meals   nystatin (MYCOSTATIN) powder   No No   Sig: Apply topically 3 (three) times a day   Patient not taking: Reported on 3/7/2022    ticagrelor (Brilinta) 90 MG   No No   Sig: Take 1 tablet (90 mg total) by mouth every 12 (twelve) hours   Patient not taking: Reported on 3/7/2022       Facility-Administered Medications: None       Allergies   Allergen Reactions    Amoxicillin Hives     childhood       Objective   Vitals: Blood pressure 155/78, pulse 56, temperature 97 5 °F (36 4 °C), resp  rate 18, SpO2 94 %  4 liters nasal cannula,There is no height or weight on file to calculate BMI  No intake or output data in the 24 hours ending 04/08/22 1312  Invasive Devices  Report    Peripheral Intravenous Line            Peripheral IV 04/05/22 Right Arm 2 days                Physical Exam  Vitals reviewed  Constitutional:       General: He is not in acute distress  Appearance: He is well-developed  He is morbidly obese  He is not toxic-appearing or diaphoretic  Interventions: Nasal cannula in place  HENT:      Head: Normocephalic and atraumatic  Eyes:      General: No scleral icterus  Neck:      Trachea: No tracheal deviation  Cardiovascular:      Rate and Rhythm: Normal rate and regular rhythm  Heart sounds: S1 normal and S2 normal  No murmur heard  No friction rub  No gallop  Pulmonary:      Effort: Pulmonary effort is normal  No tachypnea, accessory muscle usage or respiratory distress  Breath sounds: No stridor  Decreased breath sounds present  No wheezing, rhonchi or rales  Chest:      Chest wall: No tenderness     Abdominal:      General: Bowel sounds are normal  There is no distension  Palpations: Abdomen is soft  Tenderness: There is no abdominal tenderness  Comments: Pannus with dressing intact with serous weeping  Musculoskeletal:         General: No tenderness  Cervical back: Neck supple  Right lower leg: Edema present  Left lower leg: Edema present  Skin:     General: Skin is warm and dry  Findings: No rash  Neurological:      Mental Status: He is alert and oriented to person, place, and time  GCS: GCS eye subscore is 4  GCS verbal subscore is 5  GCS motor subscore is 6  Psychiatric:         Speech: Speech normal          Behavior: Behavior normal  Behavior is cooperative  Lab Results:    Lab Results   Component Value Date    WBC 8 13 2022    HGB 9 5 (L) 2022    HCT 34 1 (L) 2022    MCV 82 2022     (H) 2022     Lab Results   Component Value Date     2016    SODIUM 139 2022    K 4 4 2022     2022    CO2 35 (H) 2022    AGAP 3 (L) 2022    BUN 13 2022    CREATININE 1 03 2022    GLUC 113 2022    GLUF 144 (H) 2021    CALCIUM 8 6 2022    AST 26 2022    ALT 15 2022    ALKPHOS 146 (H) 2022    PROT 6 9 2016    TP 7 9 2022    BILITOT 0 3 2016    TBILI 0 35 2022    EGFR 90 2022     VB     Imaging Studies: I have personally reviewed pertinent reports  and I have personally reviewed pertinent films in PACS   Lower extremity duplex negative for VTE  Chest x-ray shows no acute pulmonary infiltrate, effusion, or mass  CTA chest from 2021 shows mild bibasilar atelectasis with no PE and no other parenchymal change  EKG, Pathology, and Other Studies: I have personally reviewed pertinent reports  Echocardiogram done 2021 shows EF 65% with normal RV size and function      Pulmonary Results (PFTs, PSG): None    VTE Prophylaxis: Sequential compression device (Venodyne)  and Enoxaparin (Lovenox)    Code Status: Level 1 - Full Code    None    Portions of the record may have been created with voice recognition software  Occasional wrong word or "sound a like" substitutions may have occurred due to the inherent limitations of voice recognition software  Read the chart carefully and recognize, using context, where substitutions have occurred

## 2022-04-08 NOTE — UTILIZATION REVIEW
Continued Stay Review    Date: 4-8-22                       Current Patient Class: inpatient  Current Level of Care: med surg    HPI:41 y o  male initially admitted on 3-30-22     Assessment/Plan:     Large habitus  Cellulitis below the umbilicus diffusely throughout the lower abdomen slowly improving  Less erythema  Wound care plan of multiple sites every other day  Continue iv ancef  New order for pulmonology consult to evaluate oxygen requirements  Vital Signs:       Date/Time Temp Pulse Resp BP MAP (mmHg) SpO2   04/08/22 08:58:43 97 5 °F (36 4 °C) 56 18 155/78 104 94 %             Pertinent Labs/Diagnostic Results:       Results from last 7 days   Lab Units 04/07/22  0438 04/05/22  0542 04/03/22  0454 04/02/22  0542 04/02/22  0542   WBC Thousand/uL 8 13 8 69 8 03   < > 7 06   HEMOGLOBIN g/dL 9 5* 9 7* 8 8*  --  9 3*   HEMATOCRIT % 34 1* 35 6* 32 5*  --  34 1*   PLATELETS Thousands/uL 416* 437* 375   < > 355   NEUTROS ABS Thousands/µL  --   --   --   --  4 86    < > = values in this interval not displayed           Results from last 7 days   Lab Units 04/07/22  0438 04/06/22  0602 04/05/22  0542 04/04/22  0608 04/03/22  0454   SODIUM mmol/L 139 140 139 137 134*   POTASSIUM mmol/L 4 4 4 7 5 0 4 8 4 9   CHLORIDE mmol/L 101 102 102 102 101   CO2 mmol/L 35* 33* 34* 30 28   ANION GAP mmol/L 3* 5 3* 5 5   BUN mg/dL 13 15 16 20 26*   CREATININE mg/dL 1 03 0 99 1 08 0 99 1 07   EGFR ml/min/1 73sq m 90 94 85 94 86   CALCIUM mg/dL 8 6 8 5 8 6 8 4 8 1*     Results from last 7 days   Lab Units 04/02/22  0542   AST U/L 26   ALT U/L 15   ALK PHOS U/L 146*   TOTAL PROTEIN g/dL 7 9   ALBUMIN g/dL 2 3*   TOTAL BILIRUBIN mg/dL 0 35     Results from last 7 days   Lab Units 04/08/22  0735 04/07/22  2041 04/07/22  1641 04/07/22  1149 04/07/22  0725 04/06/22 2055 04/06/22  1540 04/06/22  1115 04/06/22  0735 04/05/22 2050 04/05/22  1606 04/05/22  1109   POC GLUCOSE mg/dl 109 116 107 143* 118 124 149* 169* 114 125 120 160*     Results from last 7 days   Lab Units 04/07/22  0438 04/06/22  0602 04/05/22  0542 04/04/22  0608 04/03/22  0454 04/02/22  0542   GLUCOSE RANDOM mg/dL 113 114 110 120 136 113       Results from last 7 days   Lab Units 04/05/22  1005   PH MAGUE  7 301   PCO2 MAGUE mm Hg 71 2*   PO2 MAGUE mm Hg 45 3*   HCO3 MAGUE mmol/L 34 3*   BASE EXC MAGUE mmol/L 5 8   O2 CONTENT MAGUE ml/dL 13 1   O2 HGB, VENOUS % 75 0         Results from last 7 days   Lab Units 04/06/22  1235   CLARITY UA  Clear   COLOR UA  Yellow   SPEC GRAV UA  1 025   PH UA  6 0   GLUCOSE UA mg/dl Negative   KETONES UA mg/dl Negative   BLOOD UA  Negative   PROTEIN UA mg/dl Trace*   NITRITE UA  Negative   BILIRUBIN UA  Negative   UROBILINOGEN UA E U /dl 0 2   LEUKOCYTES UA  Negative   WBC UA /hpf 0-1   RBC UA /hpf None Seen   BACTERIA UA /hpf None Seen   EPITHELIAL CELLS WET PREP /hpf Occasional           Scheduled Medications:    amLODIPine, 10 mg, Oral, Daily  aspirin, 81 mg, Oral, Daily  atorvastatin, 80 mg, Oral, Daily With Dinner  carvedilol, 6 25 mg, Oral, BID With Meals  cefazolin, 2,000 mg, Intravenous, Q6H  enoxaparin, 60 mg, Subcutaneous, Daily  famotidine, 20 mg, Oral, BID  insulin lispro, 2-12 Units, Subcutaneous, 4x Daily (AC & HS)  lisinopril, 10 mg, Oral, Daily  melatonin, 6 mg, Oral, HS  oxyCODONE-acetaminophen, 1 tablet, Oral, Q6H SHAZIA  zinc sulfate, 220 mg, Oral, Daily      Continuous IV Infusions:     PRN Meds:  acetaminophen, 650 mg, Oral, Q6H PRN  albuterol, 2 puff, Inhalation, Q6H PRN  HYDROmorphone, 1 mg, Intravenous, Q4H PRN        Discharge Plan: to be determined     Network Utilization Review Department  ATTENTION: Please call with any questions or concerns to [phone] and carefully listen to the prompts so that you are directed to the right person   All voicemails are confidential   Patt Joshi all requests for admission clinical reviews, approved or denied determinations and any other requests to dedicated fax number below belonging to the [facility] where the patient is receiving treatment   List of dedicated fax numbers for the Facilities:  [address] [place] [address] DENIALS (Administrative/Medical Necessity) [phone]   [address] (Maternity/NICU/Pediatrics) [phone] [address]  [address] [facility] [place] [address] [address] [place] [address] [address]on [address] [place] [address] [phone]

## 2022-04-08 NOTE — DISCHARGE INSTR - AVS FIRST PAGE
Dealing with Weight Control Concerns  Principles and Resources    Introduction:     The problem: 60% of Americans are now overweight or obese  Children are especially hit hard by this epidemic, and are acquiring 'adult' obesity-related diseases such as Type 2 diabetes mellitus at an early age unheard of even 30 years ago  Obesity is now by some calculations the number one preventable cause of disease and death in this country  Many of those suffering from obesity spend their lives going on or off various diets in a desperate attempt to finally keep off the pounds  Such attempts are ultimately futile, since 'diets' (defined as any unusual way of eating that is non-sustainable and will eventually be abandoned) have been shown scientifically to not work  If 'diets' don't work, what does? Why is losing weight so difficult? Weight control on one level is simple - just take in (and absorb) fewer calories than you expend and the laws of thermodynamics guarantee you will lose weight  However, simple does not necessarily mean easy  Many aspects of our society predispose to excess weight gain  These include:  Ubiquitous advertising and easy accessibility to high calorie but low nutrient-dense foods  Distortion in portion sizes is a major problem, with excess serving sizes that have often doubled in the last 20 years  More Americans are eating out - restaurant food is generally designed for entertainment, not health  Portions (and calories) are often excessive  Low fiber foods and completing a meal faster than 20 minutes prevent proper feedback from the stomach and brain of when to stop eating  Fast absorbed (high glycemic index) carbohydrates, such as commercial flour products, which induce overeating  A sedentary lifestyle also impairs the body's ability to balance calories  Excess stress and lack of life balance predispose all of us to 'emotional eating' of 'comfort foods' that pack in the calories  Cultural attitudes towards food, especially early childhood experiences can deeply embed behavior that is resistant to future change  A family tendency towards obesity (genetic and congenital factors) is often expressed in an environment of excess  Only uncommonly does an undiagnosed medical problem such as hypothyroidism turn out to be the cause of the weight gain  Certain medications however can predispose to weight gain - ask your doctor  The totality of all these factors makes it very difficult for most people to lose and then maintain proper weight  Excess weight is a symptom of unhealthy eating/ lack of fitness  Focus on changing the underlying cause, not on the weight itself  When fitness is restored and sustainable healthy eating patterns are established, the weight will come down naturally  The solution is simple (but not necessarily easy): adopt a whole foods style of eating high nutrient/calorie ratio food and exercise at least one hour daily or almost daily, and do this for the rest of your life  Details on this healthy lifestyle are listed below  Only scientifically validated concepts are included in the specific food recommendations listed in this section:  ? Eat mainly whole foods that are rich in low calorie, nutrient dense foods such as vegetables and whole fruit  A whole food is any food as it is found in nature with no or minimal processing, or traditional foods like bread whose ingredients are minimally processed  Examples:   Whole food Partly Processed food (eat sparingly) Heavily processed food (avoid)   Apple, other whole fruit Apple juice Apple Pop tarts, etc    Whole grains such as Basmati brown rice, barley, quinoa, steel cut oatmeal  Whole grain pasta, 100% stone-ground or sprouted grain whole wheat (or other grain) bread    (minimize whole wheat bread that is finely milled & feels 'squishy' when compress it); white rice White bread, bagels, pastries, pizza, many commercial breakfast cereals, pasta made from white flour   Whole vegetables like broccoli, spinach, yams, etc  Veggie smoothies Veggie chips or sticks, Nutraceutical or food bars   Lentil (Mic), whole soybean & other bean dishes Foods made with processed soybean, e g  'textured vegetable protein' Bean chips     ? At least three quarters of your plate should be plant food, (? 1/4 animal products), and should include at least 3-6 servings of vegetables (1 serving = 1 cup raw or ½ cup cooked) and 3-4 whole fruit per day:    [facility] [id number] (ARWEIGHTLOSSDIET)    ? Eat as many raw vegetables (such as in a salad) as you can every day  Eat these first in all meals  Use only a small amount of low fat dressing  ? Eat liberal amounts of cooked vegetables, including at least 1 serving of cooked greens daily, e g  kale, collards, spinach, broccoli  ? Eat at least 2 whole fruit (not fruit juice or drink) per day  ? Practice cooking vegetarian main dishes, so you reduce reliance on animal products  See below 'Vegetarian cooking for everyone' and similar cookbooks  ? Always spend at least 20 minutes eating all meals  This allows time for the stomach to feel full  ? Don't drink your calories - the body can't balance it's energy if you do  Don't drink regular or diet soda, commercial ice tea or similar; large (> 4 oz ) of juice or milk per day  ? Include moderate amounts of low glycemic index, high fiber carbohydrate sources such as beans, squash and whole grain cereals (as opposed to finely milled flour products, potatoes and white rice)  (See separate handouts)  Keep average glycemic index < 60  Avoid all white flour products whenever you can   ? Avoid excess and unhealthy Trans and saturated fats: Limit frying  Trim visible fat from meats and choose only the leanest varieties (e g  those that end with '-loin')  Avoid 'industrially produced' meats, such as 'corn-fed steers'   Cows are designed to graze, not  a crowded feedlot and eat corn, which makes them (and you!) fat and sick  Choose white meat of poultry without the skin  Fresh fish is a healthy choice  Use cheese only as a condiment, not as a central part of a meal  Use butter and margarine sparingly  Limit heavy sauces and gravies  Limit even low fat beef and poultry to one or two servings a week total  Fish can be eaten up to 2 - 4 servings per week  ? When eating out, choose the 'Heart healthy' or 'low fat' offerings  Avoid cheap fast food restaurants and 'all you can eat' buffets  Even salad bars can lead to overdoing the calories if you pile up your plate with non-vegetable offerings and dressings  If the portion is excessive, have the excess packaged for a second home meal or give some to a  before you start eating  (See separate handout on tips for eating out healthily)  Re-train yourself to eat proper serving sizes of all foods (besides vegetables & whole fruit, which should be eaten in abundance)  This is necessary because of the ubiquitous 'portion distortion' that has normalized excess portion sizes in our society  Obtain a portion-control diet plate set by going to www  thedietplate  com or calling 6 [phone]  Use it  Take the 'Portion distortion' quizzes: http://ld8368 nhlbihin net/portion/  Calorie awareness - learn to make low calorie but high nutrient choices  Choose foods that have more grams than calories (i e  G/Kcal > 1) per serving size  The best references to learn this are the following books: 'Picture Perfect Weight Loss' and accompanying cookbook (see below)  Teaches scientifically sound Food Awareness program using mainly pictures  Adequate fiber/ bulk  This happens almost automatically if you eat whole foods  'Volumetrics' is an excellent reference to support this concept (see below)  Pacing of Weight loss: 0 5 to 2 lbs/week  One needs to have a daily calorie deficit of 500 kcal to lose 1 lb  in a week  Trying to lose >2 lbs   /week is a recipe for failure and increases chance of inadequate nutrient intake  An exception is very low calorie diets done under professional guidance in a structured program   Avoid 'dieting'  There are new diets every week, with periodic fads such as Grapefruit diet, Vinegar diet, etc  Healthy nutrition is all about balance, variety & moderation  Especially avoid any diet that labels any one nutrient class (such as carbohydrates) as either 'bad' or 'exclusively good'  No unsustainable diet works, and they should all be ignored  Decide on a reasonable quota of sweets and treats servings to eat per week (such as 3 to 5) and stick with it  (A serving is 1 slice of pie or cake, or 2 medium cookies)  Make a little ritual out of eating these, doing nothing else at the time so you can savor your treat slowly and mindfully without any guilt  Consider taking a multivitamin/multimineral daily supplement to ensure adequate nutrients while losing weight (scientific benefit not proven)  Exercise for at least 60 minutes daily or almost daily  Regular exercise has been demonstrated to be crucial for maintaining weight control  (see exercise handouts)  This should include at least moderate level aerobics (at least at the level of brisk walking - not just strolling)  Should include strength training for at least 20 minutes at least 2 - 3 alternate days/week  While education on proper eating & exercise is important, this is often the easiest aspect of lifestyle change  To accomplish all the above, you need also to proactively organize your life to allow adequate time to shop for, prepare and enjoy healthy food as well as to exercise  (See separate handout on food shopping tips)  This usually means that you will have to give up something else, which in turn means you need to adjust your value system of what is important to you     Develop the mental habit of planning how you are going to eat well and exercise at the beginning of every week, with a quick review at the beginning of every day  Such a change in values and lifestyle often demands an entire repertoire of 'meta-skills', which may include the following:  Assertiveness - the ability to say 'no' in a healthy way to people and situations that are not in line with your new healthy lifestyle  Self-awareness - the ability to monitor how balanced your life is every day and, just like a musical instrument, to 're-tune' your life on a regular basis as the need arises  Staying centered helps one stay on goal   Positive attitude - the ability to tackle the inevitable challenges, obstacles and setbacks involved in any lifestyle change with constructive action (or if this is not possible, then graceful acceptance)  Self-acceptance - the ability to unconditionally accept all sides of yourself while at the same time not indulging or getting lost in unhealthy emotional states that lead to counter-productive behaviors  This includes forgiving any lapses, instead spending your emotional energy getting back on track  Healthy skepticism - the ability to critically assess the barrage of questionable health claims aimed at consumers, to detect 'saboteur foods' that pretend to be healthy but aren't and to cut thru market hype  Self-discipline: the ability to resist 'comfort food' and sedentary temptations of the moment and keep a long view of what you really want  Given all the skills required and the societal predisposition to weight gain on multiple levels, we should not be surprised that weight loss, while simple, is also difficult  So if you fall off your healthy lifestyle, don't get down on yourself  Give yourself a break and just get back on track as soon as you realize the need to do so  Do something positive for yourself everyday, even if it is not perfect  Reward yourself in a healthy manner for your efforts  Enlist the moral support and coaching of trusted friends   Systems like Weight Watchers may help  Dedicate at least ½ hr  but better 1 hr  per day of guarded time each day to lifestyle change  The secret is that there is no secret  There are a large number of skills you will have to learn to accomplish your goals of improved fitness and healthy eating  When you accomplish these, the weight will normalize by itself  Accomplishing your goals requires discipline, hard work and sacrifice - don't let anyone tell you otherwise! Yet it can be immensely satisfying and fun  Break it down into manageable short-term goals  Small but consistent steps are more important than brief intense efforts to change  Have a professional monitor your progress  Utilize the skills of qualified specialists such as Personal trainers, Wellness coaches and Nutritionists  Don't try to go it alone, for the same reason you would not try to become a pianist or a doctor on your own  A support network of friends with a healthy lifestyle and professionals is often crucial to success  Recommended Readings:   Eat to Live by MD Ruth Cheung  2003  Focuses on Nutrient/Calorie ratio as a key to successful weight loss  The Volumetrics Weight-Control Plan by Juli Hernandez 2000  Very good book to understand how to use low calorie dense foods to lose weight and control hunger  Picture-Perfect Weight loss (& cookbook) by Dr Ruth Ann Carcamo  [employer]  2000  Teaches calorie awareness thru pictures! The Omnivore's dilemma and In Defense of Food by [employer]  These books explain where our food comes from and the strengths, weaknesses and toxicities of American food culture  Glycemic index cooking made easy by Oscar Dumont & Corina Zelaya  [address]  2007  Helps one put the Glycemic index concept in action, with lots of delicious recipes  Vegetarian Cooking for Everyone by [employer]  Publisher: [address], 0  An excellent guide to cooking vegetables in interesting and tasty ways that can be used by vegetarians and non-vegetarians alike  Mindless Eating by Bev Villalobos, PhD  [facility], 2006  Explains how we all eat for reasons other than to satisfy hunger and what to do about it  Fast food, Good food by Md aDvonte Conrad, [address], [address]  Quick and easy recipes for busy people  Rindge over [employer] by [employer] 2012  A Vegan approach to healthy eating      Long term goals:   __________________________________________________________  __________________________________________________________  __________________________________________________________    [place] term goals:  ___________________________________________________________  ___________________________________________________________  ___________________________________________________________      © 3/ 2008  Saurabh Duarte MD  Board certified, Family, Sports and Integrative medicine    Revised 8/2019

## 2022-04-08 NOTE — PROGRESS NOTES
Progress Note - Infectious Disease   Denia Modi 37 y o  male MRN: [id number]  Unit/Bed#: [address] [place] [address] Encounter: [id number]      Impression/Plan:  1   Abdominal wall cellulitis/panniculitis   Clinically appearing streptococcal   03/30/2022 wound culture from the abdominal wall has grown 3+ group C Streptococcus, few colonies of MSSA and few colonies of group G Streptococcus   03/30/2022 CT abdomen and pelvis consistent with panniculitis, no intra-abdominal abscess  -continue cefazolin high-dose 2 g IV every 6 hours through today, 4/8/22   -As steadily improving significantly, upon discharge can transition to high-dose Keflex 1 g every 6 hours to complete a total 2 week antibiotic course from high dose cefazolin start 4/4/22 through 4/17/22  -surgery on consult to evaluate panniculitis  -for bedside I&D today  -wound care/dry wicking skin care per surgery and wound care consultant     2  Groin intertrigo and chronic left groin thigh chronic wound   Possible portal of entry  -local topical and wound care per wound care RN  -serial exam     3   History of left groin hidradenitis   Status post excision April 2021 with chronic wound      4   Morbid obesity   BMI 59 2   Risk factor for infection, poor wound healing   -lifestyle modification and weight loss management discussed and patient receptive     5   Poorly controlled DM   With hyperglycemia   Recent A1c 03/2022 7 5   Risk factor for infection   -blood glucose management per primary care team    I have discussed the above management plan in detail with patient, RN, and the primary service  We will see patient again 4/11/22 if here  Please call ID on call physician in meantime if questions  Antibiotics:  High dose Cefazolin D4-5     Subjective:  Patient has no fever, chills, sweats overnight; no nausea, vomiting, diarrhea; no increased cough, shortness of breath; no increased abdominal pain    Patient continues to have some spontaneous drainage from lower pannus  Surgical bedside I and D planned for today  Patient also has been requiring oxygen and is being evaluated for home O2 protocol  No new symptoms  Objective:  Vitals:  Temp:  [97 5 °F (36 4 °C)-98 3 °F (36 8 °C)] 97 5 °F (36 4 °C)  HR:  [56-68] 56  Resp:  [18] 18  BP: (155-176)/(72-86) 155/78  SpO2:  [89 %-95 %] 94 %  Temp (24hrs), Av 9 °F (36 6 °C), Min:97 5 °F (36 4 °C), Max:98 3 °F (36 8 °C)  Current: Temperature: 97 5 °F (36 4 °C)    Physical Exam:   General Appearance:  60-year-old morbidly obese male, resting supine, fairly comfortably in bed, alert, interactive, nontoxic, no acute distress on 4L NCO2 statting 94% with conversation   Throat: Oropharynx moist without lesions  Lungs:   Clear to auscultation bilaterally; no wheezes, rhonchi or rales; respirations unlabored   Heart:  RRR; no murmur   Abdomen:   Soft, protuberant pannus, with scant seropurulent drainage from hair follicles, no tender fluctuance noted on exam, slowly dissipating pink hue within marked border of lower half of abdomen, positive bowel sounds  Extremities: No clubbing, cyanosis, + LE chronic venous staining edema   : No Robbins, no SPT   Skin: No new rashes or lesions  IV site nontender    Less maceration of groin folds, shallow left thigh wound clean base, no active drainage       Labs, Imaging, & Other studies:   All pertinent labs and imaging studies were personally reviewed  Results from last 7 days   Lab Units 22  0438 22  0542 22  0454   WBC Thousand/uL 8 13 8 69 8 03   HEMOGLOBIN g/dL 9 5* 9 7* 8 8*   PLATELETS Thousands/uL 416* 437* 375     Results from last 7 days   Lab Units 22  0438 22  0602 22  0602 22  0542 22  0542 22  0454 22  0542   SODIUM mmol/L 139  --  140  --  139   < > 131*   POTASSIUM mmol/L 4 4   < > 4 7   < > 5 0   < > 5 0   CHLORIDE mmol/L 101   < > 102   < > 102   < > 100   CO2 mmol/L 35*   < > 33*   < > 34*   < > 25   BUN mg/dL 13   < > 15   < > 16   < > 31*   CREATININE mg/dL 1 03   < > 0 99   < > 1 08   < > 1 22   EGFR ml/min/1 73sq m 90   < > 94   < > 85   < > 73   CALCIUM mg/dL 8 6   < > 8 5   < > 8 6   < > 8 4   AST U/L  --   --   --   --   --   --  26   ALT U/L  --   --   --   --   --   --  15   ALK PHOS U/L  --   --   --   --   --   --  146*    < > = values in this interval not displayed

## 2022-04-08 NOTE — PLAN OF CARE
Problem: MOBILITY - ADULT  Goal: Maintain or return to baseline ADL function  Description: INTERVENTIONS:  -  Assess patient's ability to carry out ADLs; assess patient's baseline for ADL function and identify physical deficits which impact ability to perform ADLs (bathing, care of mouth/teeth, toileting, grooming, dressing, etc )  - Assess/evaluate cause of self-care deficits   - Assess range of motion  - Assess patient's mobility; develop plan if impaired  - Assess patient's need for assistive devices and provide as appropriate  - Encourage maximum independence but intervene and supervise when necessary  - Involve family in performance of ADLs  - Assess for home care needs following discharge   - Consider OT consult to assist with ADL evaluation and planning for discharge  - Provide patient education as appropriate  Outcome: Progressing  Goal: Maintains/Returns to pre admission functional level  Description: INTERVENTIONS:  - Perform BMAT or MOVE assessment daily    - Set and communicate daily mobility goal to care team and patient/family/caregiver     - Collaborate with rehabilitation services on mobility goals if consulted  - Stand patient 3 times a day  - Ambulate patient 3 times a day  - Out of bed to chair 3 times a day   - Out of bed for meals 3 times a day  - Out of bed for toileting  - Record patient progress and toleration of activity level   Outcome: Progressing     Problem: Prexisting or High Potential for Compromised Skin Integrity  Goal: Skin integrity is maintained or improved  Description: INTERVENTIONS:  - Identify patients at risk for skin breakdown  - Assess and monitor skin integrity  - Assess and monitor nutrition and hydration status  - Monitor labs   - Assess for incontinence   - Turn and reposition patient  - Assist with mobility/ambulation  - Relieve pressure over bony prominences  - Avoid friction and shearing  - Provide appropriate hygiene as needed including keeping skin clean and dry  - Evaluate need for skin moisturizer/barrier cream  - Collaborate with interdisciplinary team   - Patient/family teaching  - Consider wound care consult   Outcome: Progressing     Problem: Potential for Falls  Goal: Patient will remain free of falls  Description: INTERVENTIONS:  - Educate patient/family on patient safety including physical limitations  - Instruct patient to call for assistance with activity   - Consult OT/PT to assist with strengthening/mobility   - Keep Call bell within reach  - Keep bed low and locked with side rails adjusted as appropriate  - Keep care items and personal belongings within reach  - Initiate and maintain comfort rounds  - Make Fall Risk Sign visible to staff  - Offer Toileting every 2 Hours, in advance of need    Outcome: Progressing     Problem: PAIN - ADULT  Goal: Verbalizes/displays adequate comfort level or baseline comfort level  Description: Interventions:  - Encourage patient to monitor pain and request assistance  - Assess pain using appropriate pain scale  - Administer analgesics based on type and severity of pain and evaluate response  - Implement non-pharmacological measures as appropriate and evaluate response  - Consider cultural and social influences on pain and pain management  - Notify physician/advanced practitioner if interventions unsuccessful or patient reports new pain  Outcome: Progressing     Problem: INFECTION - ADULT  Goal: Absence or prevention of progression during hospitalization  Description: INTERVENTIONS:  - Assess and monitor for signs and symptoms of infection  - Monitor lab/diagnostic results  - Monitor all insertion sites, i e  indwelling lines, tubes, and drains  - Monitor endotracheal if appropriate and nasal secretions for changes in amount and color  - [place] appropriate cooling/warming therapies per order  - Administer medications as ordered  - Instruct and encourage patient and family to use good hand hygiene technique  - Identify and instruct in appropriate isolation precautions for identified infection/condition  Outcome: Progressing     Problem: DISCHARGE PLANNING  Goal: Discharge to home or other facility with appropriate resources  Description: INTERVENTIONS:  - Identify barriers to discharge w/patient and caregiver  - Arrange for needed discharge resources and transportation as appropriate  - Identify discharge learning needs (meds, wound care, etc )  - Arrange for interpretive services to assist at discharge as needed  - Refer to Case Management Department for coordinating discharge planning if the patient needs post-hospital services based on physician/advanced practitioner order or complex needs related to functional status, cognitive ability, or social support system  Outcome: Progressing     Problem: Knowledge Deficit  Goal: Patient/family/caregiver demonstrates understanding of disease process, treatment plan, medications, and discharge instructions  Description: Complete learning assessment and assess knowledge base    Interventions:  - Provide teaching at level of understanding  - Provide teaching via preferred learning methods  Outcome: Progressing

## 2022-04-09 VITALS
DIASTOLIC BLOOD PRESSURE: 78 MMHG | OXYGEN SATURATION: 91 % | HEART RATE: 54 BPM | TEMPERATURE: 97.1 F | SYSTOLIC BLOOD PRESSURE: 138 MMHG | RESPIRATION RATE: 18 BRPM

## 2022-04-09 LAB
ANION GAP SERPL CALCULATED.3IONS-SCNC: 5 MMOL/L (ref 4–13)
BASOPHILS # BLD AUTO: 0.03 THOUSANDS/ΜL (ref 0–0.1)
BASOPHILS NFR BLD AUTO: 0 % (ref 0–1)
BUN SERPL-MCNC: 14 MG/DL (ref 5–25)
CALCIUM SERPL-MCNC: 8.4 MG/DL (ref 8.3–10.1)
CHLORIDE SERPL-SCNC: 98 MMOL/L (ref 100–108)
CO2 SERPL-SCNC: 36 MMOL/L (ref 21–32)
CREAT SERPL-MCNC: 0.96 MG/DL (ref 0.6–1.3)
EOSINOPHIL # BLD AUTO: 0.26 THOUSAND/ΜL (ref 0–0.61)
EOSINOPHIL NFR BLD AUTO: 4 % (ref 0–6)
ERYTHROCYTE [DISTWIDTH] IN BLOOD BY AUTOMATED COUNT: 19.3 % (ref 11.6–15.1)
GFR SERPL CREATININE-BSD FRML MDRD: 98 ML/MIN/1.73SQ M
GLUCOSE SERPL-MCNC: 106 MG/DL (ref 65–140)
GLUCOSE SERPL-MCNC: 114 MG/DL (ref 65–140)
GLUCOSE SERPL-MCNC: 229 MG/DL (ref 65–140)
HCT VFR BLD AUTO: 34.5 % (ref 36.5–49.3)
HGB BLD-MCNC: 9.6 G/DL (ref 12–17)
IMM GRANULOCYTES # BLD AUTO: 0.04 THOUSAND/UL (ref 0–0.2)
IMM GRANULOCYTES NFR BLD AUTO: 1 % (ref 0–2)
LYMPHOCYTES # BLD AUTO: 1.3 THOUSANDS/ΜL (ref 0.6–4.47)
LYMPHOCYTES NFR BLD AUTO: 18 % (ref 14–44)
MAGNESIUM SERPL-MCNC: 1.7 MG/DL (ref 1.6–2.6)
MCH RBC QN AUTO: 22.5 PG (ref 26.8–34.3)
MCHC RBC AUTO-ENTMCNC: 27.8 G/DL (ref 31.4–37.4)
MCV RBC AUTO: 81 FL (ref 82–98)
MONOCYTES # BLD AUTO: 0.47 THOUSAND/ΜL (ref 0.17–1.22)
MONOCYTES NFR BLD AUTO: 7 % (ref 4–12)
NEUTROPHILS # BLD AUTO: 5.04 THOUSANDS/ΜL (ref 1.85–7.62)
NEUTS SEG NFR BLD AUTO: 70 % (ref 43–75)
NRBC BLD AUTO-RTO: 0 /100 WBCS
PHOSPHATE SERPL-MCNC: 4.1 MG/DL (ref 2.7–4.5)
PLATELET # BLD AUTO: 354 THOUSANDS/UL (ref 149–390)
PMV BLD AUTO: 9.2 FL (ref 8.9–12.7)
POTASSIUM SERPL-SCNC: 4.4 MMOL/L (ref 3.5–5.3)
RBC # BLD AUTO: 4.26 MILLION/UL (ref 3.88–5.62)
SODIUM SERPL-SCNC: 139 MMOL/L (ref 136–145)
WBC # BLD AUTO: 7.14 THOUSAND/UL (ref 4.31–10.16)

## 2022-04-09 PROCEDURE — 85025 COMPLETE CBC W/AUTO DIFF WBC: CPT | Performed by: STUDENT IN AN ORGANIZED HEALTH CARE EDUCATION/TRAINING PROGRAM

## 2022-04-09 PROCEDURE — 80048 BASIC METABOLIC PNL TOTAL CA: CPT | Performed by: STUDENT IN AN ORGANIZED HEALTH CARE EDUCATION/TRAINING PROGRAM

## 2022-04-09 PROCEDURE — 99238 HOSP IP/OBS DSCHRG MGMT 30/<: CPT | Performed by: STUDENT IN AN ORGANIZED HEALTH CARE EDUCATION/TRAINING PROGRAM

## 2022-04-09 PROCEDURE — 84100 ASSAY OF PHOSPHORUS: CPT | Performed by: STUDENT IN AN ORGANIZED HEALTH CARE EDUCATION/TRAINING PROGRAM

## 2022-04-09 PROCEDURE — 83735 ASSAY OF MAGNESIUM: CPT | Performed by: STUDENT IN AN ORGANIZED HEALTH CARE EDUCATION/TRAINING PROGRAM

## 2022-04-09 PROCEDURE — 82948 REAGENT STRIP/BLOOD GLUCOSE: CPT

## 2022-04-09 RX ORDER — CEPHALEXIN 500 MG/1
500 CAPSULE ORAL EVERY 6 HOURS SCHEDULED
Qty: 40 CAPSULE | Refills: 0 | Status: SHIPPED | OUTPATIENT
Start: 2022-04-09 | End: 2022-04-19

## 2022-04-09 RX ORDER — ZINC SULFATE 50(220)MG
220 CAPSULE ORAL DAILY
Qty: 30 CAPSULE | Refills: 0 | Status: SHIPPED | OUTPATIENT
Start: 2022-04-09 | End: 2022-05-09

## 2022-04-09 RX ORDER — ACETAMINOPHEN 325 MG/1
650 TABLET ORAL EVERY 6 HOURS PRN
Qty: 30 TABLET | Refills: 0 | Status: SHIPPED | OUTPATIENT
Start: 2022-04-09 | End: 2022-05-07 | Stop reason: HOSPADM

## 2022-04-09 RX ADMIN — CEFAZOLIN SODIUM 2000 MG: 2 SOLUTION INTRAVENOUS at 11:13

## 2022-04-09 RX ADMIN — HYDROMORPHONE HYDROCHLORIDE 1 MG: 1 INJECTION, SOLUTION INTRAMUSCULAR; INTRAVENOUS; SUBCUTANEOUS at 01:24

## 2022-04-09 RX ADMIN — OXYCODONE HYDROCHLORIDE AND ACETAMINOPHEN 1 TABLET: 5; 325 TABLET ORAL at 05:58

## 2022-04-09 RX ADMIN — AMLODIPINE BESYLATE 10 MG: 10 TABLET ORAL at 09:11

## 2022-04-09 RX ADMIN — HYDROMORPHONE HYDROCHLORIDE 1 MG: 1 INJECTION, SOLUTION INTRAMUSCULAR; INTRAVENOUS; SUBCUTANEOUS at 07:37

## 2022-04-09 RX ADMIN — OXYCODONE HYDROCHLORIDE AND ACETAMINOPHEN 1 TABLET: 5; 325 TABLET ORAL at 11:59

## 2022-04-09 RX ADMIN — INSULIN LISPRO 4 UNITS: 100 INJECTION, SOLUTION INTRAVENOUS; SUBCUTANEOUS at 12:00

## 2022-04-09 RX ADMIN — ASPIRIN 81 MG: 81 TABLET, COATED ORAL at 09:10

## 2022-04-09 RX ADMIN — ENOXAPARIN SODIUM 60 MG: 60 INJECTION SUBCUTANEOUS at 09:10

## 2022-04-09 RX ADMIN — CARVEDILOL 6.25 MG: 6.25 TABLET, FILM COATED ORAL at 09:11

## 2022-04-09 RX ADMIN — ZINC SULFATE 220 MG (50 MG) CAPSULE 220 MG: CAPSULE at 09:10

## 2022-04-09 RX ADMIN — CEFAZOLIN SODIUM 2000 MG: 2 SOLUTION INTRAVENOUS at 05:59

## 2022-04-09 RX ADMIN — FAMOTIDINE 20 MG: 20 TABLET, FILM COATED ORAL at 09:11

## 2022-04-09 RX ADMIN — HYDROMORPHONE HYDROCHLORIDE 1 MG: 1 INJECTION, SOLUTION INTRAMUSCULAR; INTRAVENOUS; SUBCUTANEOUS at 13:51

## 2022-04-09 RX ADMIN — OXYCODONE HYDROCHLORIDE AND ACETAMINOPHEN 1 TABLET: 5; 325 TABLET ORAL at 00:06

## 2022-04-09 RX ADMIN — CEFAZOLIN SODIUM 2000 MG: 2 SOLUTION INTRAVENOUS at 00:00

## 2022-04-09 RX ADMIN — LISINOPRIL 10 MG: 10 TABLET ORAL at 09:11

## 2022-04-09 NOTE — PHYSICAL THERAPY NOTE
Physical Therapy Cancellation Note       04/09/22 1124   PT Last Visit   PT Visit Date 04/09/22   Note Type   Note Type Cancelled Session   Cancel Reasons   (Pt refused, reports "doing okay by myself in the room" )     Otoniel Umanzor PT, DPT [id number]

## 2022-04-09 NOTE — CASE MANAGEMENT
Case Management Discharge Planning Note    Patient name Gopi Patel  Location [address] 373/[address] [facility] 323-* MRN [id number]  : 1980 Date 2022       Current Admission Date: 3/30/2022  Current Admission Diagnosis:Cellulitis of multiple sites of trunk   Patient Active Problem List    Diagnosis Date Noted    Microcytic anemia 2022    Swelling of left lower extremity 2022    Hidradenitis suppurativa 2022    Nocturnal hypoxia 2021    Cellulitis of multiple sites of trunk 2021    Chest pain 2021    Cellulitis of left lower extremity 2021    Abscess of left thigh 2021    Difficult intubation 2021    Diabetic neuropathy ([facility] ) 2021    Morbid obesity with body mass index (BMI) of 50 0 to 59 9 in [facility]) 03/10/2021    Acute kidney injury ([facility] ) 2021    Type 1 non-ST elevation myocardial infarction (NSTEMI) ([place] [address] ) 2020    Left groin mass 2020    Type 2 diabetes mellitus with diabetic polyneuropathy, without long-term current use of insulin ([facility] ) 2018    Dyslipidemia 2017    Pilonidal cyst 2016    Essential hypertension 2016    Abnormal liver enzymes 2014    Coronary artery disease 2014      LOS (days): 10  Geometric Mean LOS (GMLOS) (days):   Days to GMLOS:     OBJECTIVE:  Risk of Unplanned Readmission Score: 20   Current admission status: Inpatient   Preferred Pharmacy:   [address] [place] [address] 24, [phone] [address] [id number]  Phone: [phone] Fax: [phone]    Primary Care Provider: Raffaele Zaidi MD    Primary Insurance: Mobile  Secondary Insurance: [id number]    DISCHARGE DETAILS:    CM received e-mail back from [employer] Director and she states she is okay with O2 being provided by [facility] through Indigent program      Repeat home O2 study done and patient requires 2L O2 continuous      CM spoke with Vaishali Armijo at [facility] and new order for O2 submitted in [place]  Patient for dc today and Oscar O POC delivered to patients room  [facility] will f/u on Monday for home concentrator delivery  Patient will drive self home-his car is in parking lot

## 2022-04-09 NOTE — PLAN OF CARE
Problem: MOBILITY - ADULT  Goal: Maintain or return to baseline ADL function  Description: INTERVENTIONS:  -  Assess patient's ability to carry out ADLs; assess patient's baseline for ADL function and identify physical deficits which impact ability to perform ADLs (bathing, care of mouth/teeth, toileting, grooming, dressing, etc )  - Assess/evaluate cause of self-care deficits   - Assess range of motion  - Assess patient's mobility; develop plan if impaired  - Assess patient's need for assistive devices and provide as appropriate  - Encourage maximum independence but intervene and supervise when necessary  - Involve family in performance of ADLs  - Assess for home care needs following discharge   - Consider OT consult to assist with ADL evaluation and planning for discharge  - Provide patient education as appropriate  Outcome: Progressing  Goal: Maintains/Returns to pre admission functional level  Description: INTERVENTIONS:  - Perform BMAT or MOVE assessment daily    - Set and communicate daily mobility goal to care team and patient/family/caregiver     - Collaborate with rehabilitation services on mobility goals if consulted  - Stand patient 3 times a day  - Ambulate patient 3 times a day  - Out of bed to chair 3 times a day   - Out of bed for meals 3 times a day  - Out of bed for toileting  - Record patient progress and toleration of activity level   Outcome: Progressing

## 2022-04-09 NOTE — PLAN OF CARE
Problem: INFECTION - ADULT  Goal: Absence or prevention of progression during hospitalization  Description: INTERVENTIONS:  - Assess and monitor for signs and symptoms of infection  - Monitor lab/diagnostic results  - Monitor all insertion sites, i e  indwelling lines, tubes, and drains  - Monitor endotracheal if appropriate and nasal secretions for changes in amount and color  - [place] appropriate cooling/warming therapies per order  - Administer medications as ordered  - Instruct and encourage patient and family to use good hand hygiene technique  - Identify and instruct in appropriate isolation precautions for identified infection/condition  Outcome: Progressing

## 2022-04-11 ENCOUNTER — TRANSITIONAL CARE MANAGEMENT (OUTPATIENT)
Dept: FAMILY MEDICINE CLINIC | Facility: CLINIC | Age: 42
End: 2022-04-11

## 2022-04-11 ENCOUNTER — OFFICE VISIT (OUTPATIENT)
Dept: WOUND CARE | Facility: HOSPITAL | Age: 42
End: 2022-04-11
Payer: COMMERCIAL

## 2022-04-11 VITALS
HEART RATE: 65 BPM | RESPIRATION RATE: 18 BRPM | TEMPERATURE: 97.7 F | DIASTOLIC BLOOD PRESSURE: 101 MMHG | SYSTOLIC BLOOD PRESSURE: 159 MMHG

## 2022-04-11 DIAGNOSIS — E11.42 TYPE 2 DIABETES MELLITUS WITH DIABETIC POLYNEUROPATHY, WITHOUT LONG-TERM CURRENT USE OF INSULIN (HCC): ICD-10-CM

## 2022-04-11 DIAGNOSIS — T81.89XA NON-HEALING SURGICAL WOUND, INITIAL ENCOUNTER: Primary | ICD-10-CM

## 2022-04-11 DIAGNOSIS — I89.0 LYMPHEDEMA: ICD-10-CM

## 2022-04-11 DIAGNOSIS — E66.01 MORBID OBESITY DUE TO EXCESS CALORIES (HCC): ICD-10-CM

## 2022-04-11 PROCEDURE — 97597 DBRDMT OPN WND 1ST 20 CM/<: CPT | Performed by: NURSE PRACTITIONER

## 2022-04-11 PROCEDURE — 99213 OFFICE O/P EST LOW 20 MIN: CPT | Performed by: NURSE PRACTITIONER

## 2022-04-11 PROCEDURE — 97598 DBRDMT OPN WND ADDL 20CM/<: CPT | Performed by: NURSE PRACTITIONER

## 2022-04-11 RX ORDER — LIDOCAINE HYDROCHLORIDE 40 MG/ML
5 SOLUTION TOPICAL ONCE
Status: DISCONTINUED | OUTPATIENT
Start: 2022-04-11 | End: 2022-06-15

## 2022-04-11 NOTE — PATIENT INSTRUCTIONS
Orders Placed This Encounter   Procedures    Wound cleansing and dressings     Left Thigh Wound:     Wash your hands with soap and water  Remove old dressing, discard into plastic bag and place in trash  Cleanse the wound with soap and water prior to applying a clean dressing  Do not use tissue or cotton balls  Do not scrub the wound  Pat dry using gauze  Shower yes on wound care days   Apply skin prep to skin surrounding wound  Apply Mesalt to the left thigh wound  Cover with ABD  Secure with tape  Change dressing daily and as needed     This was done today     Standing Status:   Future     Standing Expiration Date:   4/11/2023    Wound cleansing and dressings     Abdominal wound:    Wash your hands with soap and water  Remove old dressing, discard into plastic bag and place in trash  Cleanse the wound with soap and water prior to applying a clean dressing  Do not use tissue or cotton balls  Do not scrub the wound  Pat dry using gauze  Shower yes   Apply mesalt rope to the  wound    Cover with ABD  Secure with medipore tape  Change dressing daily and as needed    This was done today     Standing Status:   Future     Standing Expiration Date:   4/11/2023

## 2022-04-11 NOTE — PROGRESS NOTES
Patient ID: Audrey Bowen is a 37 y o  male Date of Birth 1980     Chief Complaint  Chief Complaint   Patient presents with    Follow Up Wound Care Visit     left leg wound       Allergies  Amoxicillin    Assessment:     Diagnoses and all orders for this visit:    Non-healing surgical wound, initial encounter  -     Wound cleansing and dressings; Future  -     Wound cleansing and dressings; Future  -     Debridement  -     Debridement  -     lidocaine (XYLOCAINE) 4 % topical solution 5 mL    Type 2 diabetes mellitus with diabetic polyneuropathy, without long-term current use of insulin (HCC)  -     Wound cleansing and dressings; Future  -     Wound cleansing and dressings; Future  -     lidocaine (XYLOCAINE) 4 % topical solution 5 mL    Morbid obesity due to excess calories (HCC)  -     Wound cleansing and dressings; Future  -     Wound cleansing and dressings; Future  -     lidocaine (XYLOCAINE) 4 % topical solution 5 mL    Lymphedema  -     Wound cleansing and dressings; Future  -     Wound cleansing and dressings; Future  -     lidocaine (XYLOCAINE) 4 % topical solution 5 mL              Debridement   Wound 03/07/22 Surgical Leg Left;Upper;Medial    Universal Protocol:  Consent: Written consent obtained  Consent given by: patient  Time out: Immediately prior to procedure a "time out" was called to verify the correct patient, procedure, equipment, support staff and site/side marked as required    Timeout called at: 4/11/2022 9:18 AM   Patient identity confirmed: verbally with patient      Performed by: NP  Debridement type: selective  Pain control: lidocaine 4%  Pre-debridement measurements  Length (cm): 4 7  Width (cm): 13 5  Depth (cm): 0 1  Surface Area (cm^2): 63 45  Volume (cm^3): 6 35    Post-debridement measurements  Length (cm): 4 7  Width (cm): 13 5  Depth (cm): 0 1  Percent debrided: 100%  Surface Area (cm^2): 63 45  Area debrided (cm^2): 63 45  Volume (cm^3): 6 35  Devitalized tissue debrided: biofilm, fibrin and slough  Instrument(s) utilized: curette  Bleeding: small  Hemostasis obtained with: pressure  Procedural pain (0-10): 0  Post-procedural pain: 0   Response to treatment: procedure was tolerated well    Debridement   Wound 04/11/22 Incision Abdomen Left;Medial;Lower    Universal Protocol:  Consent: Written consent obtained  Consent given by: patient  Time out: Immediately prior to procedure a "time out" was called to verify the correct patient, procedure, equipment, support staff and site/side marked as required  Timeout called at: 4/11/2022 9:19 AM   Patient identity confirmed: verbally with patient      Performed by: NP  Debridement type: selective  Pain control: lidocaine 4%  Pre-debridement measurements  Length (cm): 0 4  Width (cm): 0 8  Depth (cm): 0 5  Surface Area (cm^2): 0 32  Volume (cm^3): 0 16    Post-debridement measurements  Length (cm): 0 4  Width (cm): 0 8  Depth (cm): 0 5  Percent debrided: 100%  Surface Area (cm^2): 0 32  Area debrided (cm^2): 0 32  Volume (cm^3): 0 16  Devitalized tissue debrided: biofilm, fibrin and slough  Instrument(s) utilized: curette  Bleeding: small  Hemostasis obtained with: pressure  Procedural pain (0-10): 0  Post-procedural pain: 0   Response to treatment: procedure was tolerated well          Plan:  1  F/U visit  Wounds debrided  Patient developed a new nonhealing surgical wound on his abdomen  Wound not showing any signs symptoms of infection  Thigh wound is improving and measuring smaller  Will change treatment to both wounds to mesalt covered with ABD is and secured with tape changed daily and as needed  2  Will also order patient lymphedema pumps to help with his bilateral lower extremity lymphedema  3  Patient will followup in 3 weeks  Wound 03/07/22 Surgical Leg Left;Upper;Medial (Active)   Wound Image Images linked 04/11/22 3629   Wound Description Pink;Granulation tissue; Yellow;Slough; Epithelialization 04/11/22 5526 Sarika-wound Assessment Edema;Pink;Scar Tissue 04/11/22 0851   Wound Length (cm) 4 7 cm 04/11/22 0851   Wound Width (cm) 13 5 cm 04/11/22 0851   Wound Depth (cm) 0 1 cm 04/11/22 0851   Wound Surface Area (cm^2) 63 45 cm^2 04/11/22 0851   Wound Volume (cm^3) 6 345 cm^3 04/11/22 0851   Calculated Wound Volume (cm^3) 6 35 cm^3 04/11/22 0851   Change in Wound Size % -7 99 04/11/22 0851   Drainage Amount Large 04/11/22 0851   Drainage Description Serous; Yellow 04/11/22 0851   Non-staged Wound Description Full thickness 04/11/22 0851   Dressing Status Intact; New drainage 04/11/22 0851       Wound 04/11/22 Incision Abdomen Left;Medial;Lower (Active)   Wound Image Images linked 04/11/22 0855   Wound Description Granulation tissue;Pink 04/11/22 0855   Sarika-wound Assessment Induration;Pink 04/11/22 0855   Wound Length (cm) 0 4 cm 04/11/22 0855   Wound Width (cm) 0 8 cm 04/11/22 0855   Wound Depth (cm) 0 5 cm 04/11/22 0855   Wound Surface Area (cm^2) 0 32 cm^2 04/11/22 0855   Wound Volume (cm^3) 0 16 cm^3 04/11/22 0855   Calculated Wound Volume (cm^3) 0 16 cm^3 04/11/22 0855   Drainage Amount Moderate 04/11/22 0855   Non-staged Wound Description Full thickness 04/11/22 0855   Dressing Status Other (Comment) (no dressing) 04/11/22 0855       Wound 03/07/22 Surgical Leg Left;Upper;Medial (Active)   Date First Assessed/Time First Assessed: 03/07/22 0833   Primary Wound Type: Surgical  Location: Leg  Wound Location Orientation: Left;Upper;Medial  Wound Outcome: (c)        Wound 04/11/22 Incision Abdomen Left;Medial;Lower (Active)   Date First Assessed: 04/11/22   Primary Wound Type: Incision  Location: Abdomen  Wound Location Orientation: Left;Medial;Lower       [REMOVED] Wound 05/26/21 Incision Thigh Left (Removed)   Resolved Date: 03/07/22  Date First Assessed/Time First Assessed: 05/26/21 1914   Pre-Existing Wound: Yes  Primary Wound Type:  Incision  Location: Thigh  Wound Location Orientation: Left  Wound Description (Comments): 2 incisions  Incision's 1st Dress    [REMOVED] Wound 05/26/21 Surgical Other (Comment) Groin Left (Removed)   Resolved Date: 03/07/22  Date First Assessed/Time First Assessed: 05/26/21 1916   Pre-Existing Wound: Yes  Primary Wound Type: Surgical  Traumatic Wound Type: (c) Other (Comment)  Location: Groin  Wound Location Orientation: Left  Wound Outcome: Unkno    [REMOVED] Wound 06/01/21 Other (comment) Cellulitis Pretibial Left (Removed)   Resolved Date: 03/07/22  Date First Assessed/Time First Assessed: 06/01/21 1930   Pre-Existing Wound: Yes  Primary Wound Type: Other (comment)  Traumatic Wound Type: Cellulitis  Location: Pretibial  Wound Location Orientation: Left  Dressing Status: C  [REMOVED] Wound 06/02/21 Hip Anterior;Right (Removed)   Resolved Date: 03/07/22  Date First Assessed/Time First Assessed: 06/02/21 1028   Location: Hip  Wound Location Orientation: Anterior;Right  Wound Outcome: Unknown (No longer present)       [REMOVED] Wound 06/02/21 Sacrum (Removed)   Resolved Date: 03/07/22  Date First Assessed/Time First Assessed: 06/02/21 1034   Location: Sacrum  Wound Outcome: Unknown (No longer present)       [REMOVED] Wound 07/06/21 Leg Left (Removed)   Resolved Date: 03/07/22  Date First Assessed/Time First Assessed: 07/06/21 1639   Location: Leg  Wound Location Orientation: Left  Incision's 1st Dressing: KERLIX SUPER SPONGE 6 IN (x2), ACE WRAP 6 IN UNSTERILE (x4), SPONGE GAUZE 4 X 4 16 PLY STRL FÁTIMA    [REMOVED] Wound 03/30/22 Sacrum (Removed)   Resolved Date: 04/11/22  Date First Assessed/Time First Assessed: 03/30/22 1610   Location: Sacrum  Wound Description (Comments): 4/1/22 Stage 2 POA  Wound Outcome: (c) Other (Comment)       [REMOVED] Wound 03/30/22 Surgical Thigh Anterior; Left (Removed)   Resolved Date: 04/11/22  Date First Assessed/Time First Assessed: 03/30/22 1614   Pre-Existing Wound: Yes  Primary Wound Type: Surgical  Location: Thigh  Wound Location Orientation: Anterior; Left  Wound Description (Comments): Surgical Wound  Dressing    [REMOVED] Wound 03/30/22 Abdomen Left (Removed)   Resolved Date: 04/11/22  Date First Assessed/Time First Assessed: 03/30/22 1616   Location: Abdomen  Wound Location Orientation: Left  Wound Outcome: (c) Other (Comment)       [REMOVED] Wound 03/30/22 Thigh Anterior;Right (Removed)   Resolved Date: 04/11/22  Date First Assessed/Time First Assessed: 03/30/22 1617   Location: Thigh  Wound Location Orientation: Anterior;Right  Wound Outcome: (c) Other (Comment)       [REMOVED] Wound 03/30/22 Abscess Cellulitis Thigh Anterior;Left;Proximal (Removed)   Resolved Date: 04/11/22  Date First Assessed/Time First Assessed: 03/30/22 1618   Primary Wound Type: Abscess  Traumatic Wound Type: Cellulitis  Location: Thigh  Wound Location Orientation: Anterior;Left;Proximal  Dressing Status: New drainage  Wound     Subjective:     F/U visit for nonhealing surgical wound of left medial thigh  Patient reports he was recently hospitalized for cellulitis of his abdomen  He underwent an I&D of his abdomen and received IV antibiotics during his hospitalization  He was transitioned to p o  antibiotics which she is currently taking as prescribed  He denies any pain, fevers, or chills        The following portions of the patient's history were reviewed and updated as appropriate:   He  has a past medical history of Arthritis, Breathing difficulty, Coronary artery disease, Diabetes mellitus ([facility] [address] ), Heart disease, Hidradenitis suppurativa, History of transfusion, Hyperlipidemia, Hypertension, MI (myocardial infarction) ([facility] [address] ), Morbid obesity with BMI of 50 0-59 9, adult ([facility] ), Nicotine dependence, Obesity, and Pilonidal cyst   He   Patient Active Problem List    Diagnosis Date Noted    Microcytic anemia 04/01/2022    Swelling of left lower extremity 03/30/2022    Hidradenitis suppurativa 03/30/2022    Nocturnal hypoxia 12/04/2021    Cellulitis of multiple sites of trunk 12/02/2021    Chest pain 12/02/2021    Cellulitis of left lower extremity 06/23/2021    Abscess of left thigh 06/21/2021    Difficult intubation 05/28/2021    Diabetic neuropathy ([facility] ) 05/28/2021    Morbid obesity with body mass index (BMI) of 50 0 to 59 9 in adult [facility]) 03/10/2021    Acute kidney injury ([place] [address] ) 02/22/2021    Type 1 non-ST elevation myocardial infarction (NSTEMI) ([place] [address] ) 11/29/2020    Left groin mass 11/29/2020    Type 2 diabetes mellitus with diabetic polyneuropathy, without long-term current use of insulin ([place] [address] ) 07/26/2018    Dyslipidemia 01/23/2017    Pilonidal cyst 12/13/2016    Essential hypertension 01/14/2016    Abnormal liver enzymes 09/23/2014    Coronary artery disease 06/25/2014     He  has a past surgical history that includes Leg Surgery (Left); Coronary angioplasty with stent; Incision and drainage of wound (N/A, 1/24/2017); pr exc skin benig >4 cm trunk,arm,leg (Left, 4/6/2021); pr neg press wound tx, < 50 cm (Left, 4/6/2021); Wound debridement (Left, 4/17/2021); Wound debridement (Left, 4/22/2021); Wound debridement (Left, 5/26/2021); Wound debridement (Left, 5/28/2021); pr debridement, skin, sub-q tissue,=<20 sq cm (Left, 7/6/2021); pr exc sweat gland lesn perineal,complx (Left, 7/6/2021); and Cardiac surgery  His family history includes Diabetes in his father; Heart disease in his father and mother; Hypertension in his mother  He  reports that he quit smoking about 13 months ago  His smoking use included cigarettes  He started smoking about 15 months ago  He has a 30 00 pack-year smoking history  He has never used smokeless tobacco  He reports previous alcohol use  He reports that he does not use drugs    Current Outpatient Medications   Medication Sig Dispense Refill    acetaminophen (TYLENOL) 325 mg tablet Take 2 tablets (650 mg total) by mouth every 6 (six) hours as needed for mild pain, moderate pain or headaches 30 tablet 0    albuterol (ProAir HFA) 90 mcg/act inhaler Inhale 2 puffs every 6 (six) hours as needed for wheezing 8 5 g 3    amLODIPine (NORVASC) 10 mg tablet Take 1 tablet by mouth once daily 90 tablet 0    aspirin (ECOTRIN LOW STRENGTH) 81 mg EC tablet Take 1 tablet (81 mg total) by mouth daily 30 tablet 0    atorvastatin (LIPITOR) 80 mg tablet Take 1 tablet (80 mg total) by mouth daily 90 tablet 2    carvedilol (COREG) 6 25 mg tablet Take 1 tablet (6 25 mg total) by mouth 2 (two) times a day with meals 120 tablet 2    cephalexin (KEFLEX) 500 mg capsule Take 1 capsule (500 mg total) by mouth every 6 (six) hours for 10 days 40 capsule 0    glimepiride (AMARYL) 4 mg tablet Take 1 tablet (4 mg total) by mouth daily with breakfast 90 tablet 1    lisinopril (ZESTRIL) 10 mg tablet Take 1 tablet (10 mg total) by mouth daily 90 tablet 0    metFORMIN (GLUCOPHAGE) 1000 MG tablet Take 1 tablet (1,000 mg total) by mouth 2 (two) times a day with meals 180 tablet 1    Omega-3 Fatty Acids (fish oil) 1,000 mg Take 1 capsule (1,000 mg total) by mouth 2 (two) times a day 180 capsule 2    ticagrelor (Brilinta) 90 MG Take 1 tablet (90 mg total) by mouth every 12 (twelve) hours (Patient not taking: Reported on 3/7/2022 ) 180 tablet 2    zinc sulfate (ZINCATE) 220 mg capsule Take 1 capsule (220 mg total) by mouth daily 30 capsule 0     Current Facility-Administered Medications   Medication Dose Route Frequency Provider Last Rate Last Admin    lidocaine (XYLOCAINE) 4 % topical solution 5 mL  5 mL Topical Once PETRA Omalley         He is allergic to amoxicillin       Review of Systems   Constitutional: Negative  HENT: Negative for ear pain and hearing loss  Eyes: Negative for pain  Respiratory: Negative for chest tightness and shortness of breath  Cardiovascular: Negative for chest pain, palpitations and leg swelling  Gastrointestinal: Negative for diarrhea, nausea and vomiting  Genitourinary: Negative for dysuria  Musculoskeletal: Negative for gait problem  Skin: Positive for wound  Neurological: Negative for tremors and weakness  Psychiatric/Behavioral: Negative for behavioral problems, confusion and suicidal ideas  Objective:       Wound 03/07/22 Surgical Leg Left;Upper;Medial (Active)   Wound Image Images linked 04/11/22 0851   Wound Description Pink;Granulation tissue; Yellow;Slough; Epithelialization 04/11/22 0851   Sarika-wound Assessment Edema;Pink;Scar Tissue 04/11/22 0851   Wound Length (cm) 4 7 cm 04/11/22 0851   Wound Width (cm) 13 5 cm 04/11/22 0851   Wound Depth (cm) 0 1 cm 04/11/22 0851   Wound Surface Area (cm^2) 63 45 cm^2 04/11/22 0851   Wound Volume (cm^3) 6 345 cm^3 04/11/22 0851   Calculated Wound Volume (cm^3) 6 35 cm^3 04/11/22 0851   Change in Wound Size % -7 99 04/11/22 0851   Drainage Amount Large 04/11/22 0851   Drainage Description Serous; Yellow 04/11/22 0851   Non-staged Wound Description Full thickness 04/11/22 0851   Dressing Status Intact; New drainage 04/11/22 0851       Wound 04/11/22 Incision Abdomen Left;Medial;Lower (Active)   Wound Image Images linked 04/11/22 0855   Wound Description Granulation tissue;Pink 04/11/22 0855   Sarika-wound Assessment Induration;Pink 04/11/22 0855   Wound Length (cm) 0 4 cm 04/11/22 0855   Wound Width (cm) 0 8 cm 04/11/22 0855   Wound Depth (cm) 0 5 cm 04/11/22 0855   Wound Surface Area (cm^2) 0 32 cm^2 04/11/22 0855   Wound Volume (cm^3) 0 16 cm^3 04/11/22 0855   Calculated Wound Volume (cm^3) 0 16 cm^3 04/11/22 0855   Drainage Amount Moderate 04/11/22 0855   Non-staged Wound Description Full thickness 04/11/22 0855   Dressing Status Other (Comment) (no dressing) 04/11/22 0855       BP (!) 159/101   Pulse 65   Temp 97 7 °F (36 5 °C)   Resp 18                 Wound Instructions:  Orders Placed This Encounter   Procedures    Wound cleansing and dressings     Left Thigh Wound:     Wash your hands with soap and water    Remove old dressing, discard into plastic bag and place in trash  Cleanse the wound with soap and water prior to applying a clean dressing  Do not use tissue or cotton balls  Do not scrub the wound  Pat dry using gauze  Shower yes on wound care days   Apply skin prep to skin surrounding wound  Apply Mesalt to the left thigh wound  Cover with ABD  Secure with tape  Change dressing daily and as needed     This was done today     Standing Status:   Future     Standing Expiration Date:   4/11/2023    Wound cleansing and dressings     Abdominal wound:    Wash your hands with soap and water  Remove old dressing, discard into plastic bag and place in trash  Cleanse the wound with soap and water prior to applying a clean dressing  Do not use tissue or cotton balls  Do not scrub the wound  Pat dry using gauze  Shower yes   Apply mesalt rope to the  wound  Cover with ABD  Secure with medipore tape  Change dressing daily and as needed    This was done today     Standing Status:   Future     Standing Expiration Date:   4/11/2023    Debridement     This order was created via procedure documentation    Debridement     This order was created via procedure documentation        Diagnosis ICD-10-CM Associated Orders   1  Non-healing surgical wound, initial encounter  T81 89XA Wound cleansing and dressings     Wound cleansing and dressings     Debridement     Debridement     lidocaine (XYLOCAINE) 4 % topical solution 5 mL   2  Type 2 diabetes mellitus with diabetic polyneuropathy, without long-term current use of insulin ([facility])  E11 42 Wound cleansing and dressings     Wound cleansing and dressings     lidocaine (XYLOCAINE) 4 % topical solution 5 mL   3  Morbid obesity due to excess calories ([facility])  E66 01 Wound cleansing and dressings     Wound cleansing and dressings     lidocaine (XYLOCAINE) 4 % topical solution 5 mL   4   Lymphedema  I89 0 Wound cleansing and dressings     Wound cleansing and dressings     lidocaine (XYLOCAINE) 4 % topical solution 5 mL

## 2022-04-11 NOTE — UTILIZATION REVIEW
Notification of Discharge   This is a Notification of Discharge from our facility [address] [place] [address]  Please be advised that this patient has been discharge from our facility  Below you will find the admission and discharge date and time including the patients disposition  UTILIZATION REVIEW CONTACT:  Sophia Mae  Utilization   Network Utilization Review Department  Phone: [phone] x carefully listen to the prompts  All voicemails are confidential   Email: [email or web address]     PHYSICIAN ADVISORY SERVICES:  FOR [facility]VEO-QI-[id number] REVIEW - MEDICAL NECESSITY DENIAL  Phone: [phone]  Fax: [phone]  Email: [email or web address]     PRESENTATION DATE: 3/30/2022 10:18 AM  OBERVATION ADMISSION DATE:   INPATIENT ADMISSION DATE: 3/30/22  1:28 PM   DISCHARGE DATE: 4/9/2022  4:00 PM  DISPOSITION: Home/Self Care Home/Self Care      IMPORTANT INFORMATION:  Send all requests for admission clinical reviews, approved or denied determinations and any other requests to dedicated fax number below belonging to the campus where the patient is receiving treatment   List of dedicated fax numbers:  [address] DENIALS (Administrative/Medical Necessity) [phone]   1000  [facility] (Maternity/NICU/Pediatrics) [phone]   [facility] [phone]   130 [facility] [phone]   [address] [place] [address] [phone]   2000 [facility] [address] [place] [address],[address] [place] [address] [phone]   [facility]  [phone]   [address] [place] [address], [address]  2401 [facility] 1000 [address] [facility] [phone]

## 2022-04-20 ENCOUNTER — OFFICE VISIT (OUTPATIENT)
Dept: FAMILY MEDICINE CLINIC | Facility: CLINIC | Age: 42
End: 2022-04-20
Payer: COMMERCIAL

## 2022-04-20 VITALS
HEIGHT: 67 IN | TEMPERATURE: 98.5 F | SYSTOLIC BLOOD PRESSURE: 162 MMHG | WEIGHT: 315 LBS | DIASTOLIC BLOOD PRESSURE: 78 MMHG | OXYGEN SATURATION: 92 % | RESPIRATION RATE: 18 BRPM | BODY MASS INDEX: 49.44 KG/M2 | HEART RATE: 95 BPM

## 2022-04-20 DIAGNOSIS — E78.5 DYSLIPIDEMIA: ICD-10-CM

## 2022-04-20 DIAGNOSIS — Z76.89 ENCOUNTER FOR SUPPORT AND COORDINATION OF TRANSITION OF CARE: Primary | ICD-10-CM

## 2022-04-20 DIAGNOSIS — I10 ESSENTIAL HYPERTENSION: ICD-10-CM

## 2022-04-20 DIAGNOSIS — E11.42 DM TYPE 2 WITH DIABETIC PERIPHERAL NEUROPATHY (HCC): ICD-10-CM

## 2022-04-20 DIAGNOSIS — Z88.1 ALLERGY TO ANTIBIOTIC: ICD-10-CM

## 2022-04-20 DIAGNOSIS — E11.65 TYPE 2 DIABETES MELLITUS WITH HYPERGLYCEMIA, WITHOUT LONG-TERM CURRENT USE OF INSULIN (HCC): ICD-10-CM

## 2022-04-20 DIAGNOSIS — E66.01 MORBID OBESITY (HCC): ICD-10-CM

## 2022-04-20 PROCEDURE — 1111F DSCHRG MED/CURRENT MED MERGE: CPT | Performed by: FAMILY MEDICINE

## 2022-04-20 PROCEDURE — 99495 TRANSJ CARE MGMT MOD F2F 14D: CPT | Performed by: FAMILY MEDICINE

## 2022-04-20 RX ORDER — ATORVASTATIN CALCIUM 80 MG/1
80 TABLET, FILM COATED ORAL DAILY
Qty: 90 TABLET | Refills: 2 | Status: SHIPPED | OUTPATIENT
Start: 2022-04-20 | End: 2022-04-22 | Stop reason: SDUPTHER

## 2022-04-20 RX ORDER — CARVEDILOL 6.25 MG/1
6.25 TABLET ORAL 2 TIMES DAILY WITH MEALS
Qty: 120 TABLET | Refills: 2 | Status: SHIPPED | OUTPATIENT
Start: 2022-04-20 | End: 2022-04-22 | Stop reason: SDUPTHER

## 2022-04-20 RX ORDER — LISINOPRIL 20 MG/1
20 TABLET ORAL DAILY
Qty: 30 TABLET | Refills: 0 | Status: SHIPPED | OUTPATIENT
Start: 2022-04-20 | End: 2022-04-22 | Stop reason: SDUPTHER

## 2022-04-20 NOTE — PROGRESS NOTES
[address] [place]  37 y o  male  [id number]     Subjective:      Patient ID: Kayden Nguyen is a 37 y o  male  HPI    Patient is here for TCM following recent admission due to cellulitis in his torso  · Has the patient completed his antibiotic course? · Still has some left (2 weeks worth)  · Has the patient followed up with his outpatient sleep study for possible ANGY? · No not yet, missed their call  · Has the patient been compliant with his home oxygen? · He no longer has it  · Has the patient found he needed increasing oxygenation, has had increased shortness of breath, work of breathing etc?  · Took it back, doesn't feel short of breath   · Does patient still request weight management referral?  · Yes  · Utilizing the pictures below and patient's admission has the cellulitis decreased in size, irritation symptoms and pain? · Yes, he feels less pain and feels that the cellulitis has gotten smaller in size  Review of Systems   Constitutional: Negative for chills, fever and unexpected weight change  Cardiovascular: Positive for leg swelling  Skin: Positive for rash  Neurological: Negative for weakness  Objective:    /78   Pulse 95   Temp 98 5 °F (36 9 °C) (Tympanic)   Resp 18   Ht 5' 7" (1 702 m)   Wt (!) 176 kg (388 lb 1 6 oz)   SpO2 92%   BMI 60 79 kg/m²        Physical Exam  Vitals and nursing note reviewed  Constitutional:       General: He is not in acute distress  Appearance: He is obese  He is not ill-appearing, toxic-appearing or diaphoretic  HENT:      Head: Normocephalic and atraumatic  Cardiovascular:      Rate and Rhythm: Normal rate and regular rhythm  Heart sounds: Normal heart sounds  Pulmonary:      Effort: Pulmonary effort is normal  No respiratory distress  Breath sounds: Normal breath sounds  Abdominal:      General: Abdomen is flat        Comments: Erythematous area under the pannus, a couple of open wounds with small amount of clear drainage  Musculoskeletal:      Right lower leg: Edema present  Left lower leg: Edema present  Comments: Right mid-calf circumference of 45 5cm; left mid calf circumference 65 cm  No tenderness; chronic per patient due to previous surgery   Neurological:      Mental Status: He is alert and oriented to person, place, and time  Assessment/Plan:    Cellulitis looks remarkably improved compared to th images on admission (under media)  Patient encouraged to finish his doses of Keflex  Amaryl discontinued due to patient reporting frequent hypoglycemia  Started on Liraglutide with goals to lose weight  Referred to bariatric surgery  Lisinopril increased to 20 mg daily  Advised to repeat BMP in a week,       Diagnoses and all orders for this visit:    Encounter for support and coordination of transition of care    DM type 2 with diabetic peripheral neuropathy ([facility] )  -     liraglutide (VICTOZA) injection; Inject 0 1 mL (0 6 mg total) under the skin daily Start at 0 6 mg daily for a week, then increase to 1 2 mg daily on the second week, then increase to 1 8 mg daily on the third week and going forward  -     Ambulatory Referral to Weight Management; Future    Morbid obesity ([facility] )  -     liraglutide (VICTOZA) injection; Inject 0 1 mL (0 6 mg total) under the skin daily Start at 0 6 mg daily for a week, then increase to 1 2 mg daily on the second week, then increase to 1 8 mg daily on the third week and going forward  -     Ambulatory Referral to Weight Management; Future    Type 2 diabetes mellitus with hyperglycemia, without long-term current use of insulin (HCC)  -     metFORMIN (GLUCOPHAGE) 1000 MG tablet; Take 1 tablet (1,000 mg total) by mouth 2 (two) times a day with meals    Essential hypertension  -     carvedilol (COREG) 6 25 mg tablet;  Take 1 tablet (6 25 mg total) by mouth 2 (two) times a day with meals  -     lisinopril (ZESTRIL) 20 mg tablet; Take 1 tablet (20 mg total) by mouth daily  -     Comprehensive metabolic panel; Future    Dyslipidemia  -     Ambulatory Referral to Weight Management; Future  -     atorvastatin (LIPITOR) 80 mg tablet; Take 1 tablet (80 mg total) by mouth daily  -     Lipid Panel with Direct LDL reflex; Future    Allergy to antibiotic  -     Ambulatory Referral to Allergy; Future         RTC 2 months    The patient was counseled regarding diagnostic results, instructions for management, risk factor reductions, prognosis, patient and family education, impressions, risks and benefits of treatment options  The treatment plan was reviewed with the patient/guardian  The patient/guardian understands and agrees with the treatment plan  --  Emy Vaughan, DO    "This note has been constructed using a voice recognition system  Therefore there may be syntax, spelling, and/or grammatical errors   Please call if you have any questions "

## 2022-04-22 DIAGNOSIS — E78.5 DYSLIPIDEMIA: ICD-10-CM

## 2022-04-22 DIAGNOSIS — E11.42 DM TYPE 2 WITH DIABETIC PERIPHERAL NEUROPATHY (HCC): ICD-10-CM

## 2022-04-22 DIAGNOSIS — I10 ESSENTIAL HYPERTENSION: ICD-10-CM

## 2022-04-22 DIAGNOSIS — E66.01 MORBID OBESITY (HCC): ICD-10-CM

## 2022-04-22 DIAGNOSIS — E11.65 TYPE 2 DIABETES MELLITUS WITH HYPERGLYCEMIA, WITHOUT LONG-TERM CURRENT USE OF INSULIN (HCC): ICD-10-CM

## 2022-04-25 RX ORDER — LISINOPRIL 20 MG/1
20 TABLET ORAL DAILY
Qty: 90 TABLET | Refills: 1 | Status: SHIPPED | OUTPATIENT
Start: 2022-04-25

## 2022-04-25 RX ORDER — CARVEDILOL 6.25 MG/1
6.25 TABLET ORAL 2 TIMES DAILY WITH MEALS
Qty: 180 TABLET | Refills: 1 | Status: SHIPPED | OUTPATIENT
Start: 2022-04-25

## 2022-04-25 RX ORDER — AMLODIPINE BESYLATE 10 MG/1
10 TABLET ORAL DAILY
Qty: 90 TABLET | Refills: 1 | Status: SHIPPED | OUTPATIENT
Start: 2022-04-25

## 2022-04-25 RX ORDER — ATORVASTATIN CALCIUM 80 MG/1
80 TABLET, FILM COATED ORAL DAILY
Qty: 90 TABLET | Refills: 1 | Status: SHIPPED | OUTPATIENT
Start: 2022-04-25

## 2022-04-27 ENCOUNTER — TELEPHONE (OUTPATIENT)
Dept: FAMILY MEDICINE CLINIC | Facility: CLINIC | Age: 42
End: 2022-04-27

## 2022-04-27 DIAGNOSIS — E11.42 DM TYPE 2 WITH DIABETIC PERIPHERAL NEUROPATHY (HCC): Primary | ICD-10-CM

## 2022-04-27 RX ORDER — PEN NEEDLE, DIABETIC 29 G X1/2"
NEEDLE, DISPOSABLE MISCELLANEOUS
Qty: 50 EACH | Refills: 3 | Status: SHIPPED | OUTPATIENT
Start: 2022-04-27

## 2022-04-27 NOTE — TELEPHONE ENCOUNTER
Ordered appropriate Subcut pen needle for pt  Signed for under Dr Mara Glover as she is prescribing physician for [address] [place] [address]

## 2022-04-27 NOTE — TELEPHONE ENCOUNTER
pt received a prescription for victoza pen, needs prescription for pen needles    [facility]

## 2022-05-02 ENCOUNTER — OFFICE VISIT (OUTPATIENT)
Dept: FAMILY MEDICINE CLINIC | Facility: CLINIC | Age: 42
End: 2022-05-02
Payer: COMMERCIAL

## 2022-05-02 ENCOUNTER — OFFICE VISIT (OUTPATIENT)
Dept: WOUND CARE | Facility: HOSPITAL | Age: 42
End: 2022-05-02
Payer: COMMERCIAL

## 2022-05-02 VITALS
RESPIRATION RATE: 15 BRPM | SYSTOLIC BLOOD PRESSURE: 161 MMHG | HEART RATE: 76 BPM | DIASTOLIC BLOOD PRESSURE: 81 MMHG | TEMPERATURE: 97.6 F

## 2022-05-02 VITALS
DIASTOLIC BLOOD PRESSURE: 82 MMHG | HEIGHT: 67 IN | BODY MASS INDEX: 49.44 KG/M2 | HEART RATE: 85 BPM | TEMPERATURE: 97.5 F | RESPIRATION RATE: 20 BRPM | OXYGEN SATURATION: 94 % | SYSTOLIC BLOOD PRESSURE: 130 MMHG | WEIGHT: 315 LBS

## 2022-05-02 DIAGNOSIS — E66.01 MORBID OBESITY DUE TO EXCESS CALORIES (HCC): ICD-10-CM

## 2022-05-02 DIAGNOSIS — T81.89XA NON-HEALING SURGICAL WOUND, INITIAL ENCOUNTER: Primary | ICD-10-CM

## 2022-05-02 DIAGNOSIS — E11.42 TYPE 2 DIABETES MELLITUS WITH DIABETIC POLYNEUROPATHY, WITHOUT LONG-TERM CURRENT USE OF INSULIN (HCC): ICD-10-CM

## 2022-05-02 DIAGNOSIS — E11.9 ENCOUNTER FOR DIABETIC FOOT EXAM (HCC): ICD-10-CM

## 2022-05-02 DIAGNOSIS — I89.0 LYMPHEDEMA: ICD-10-CM

## 2022-05-02 DIAGNOSIS — I10 ESSENTIAL HYPERTENSION: Primary | ICD-10-CM

## 2022-05-02 PROCEDURE — 1111F DSCHRG MED/CURRENT MED MERGE: CPT | Performed by: FAMILY MEDICINE

## 2022-05-02 PROCEDURE — 97598 DBRDMT OPN WND ADDL 20CM/<: CPT | Performed by: NURSE PRACTITIONER

## 2022-05-02 PROCEDURE — 97597 DBRDMT OPN WND 1ST 20 CM/<: CPT | Performed by: NURSE PRACTITIONER

## 2022-05-02 PROCEDURE — 99213 OFFICE O/P EST LOW 20 MIN: CPT | Performed by: NURSE PRACTITIONER

## 2022-05-02 PROCEDURE — 99213 OFFICE O/P EST LOW 20 MIN: CPT | Performed by: FAMILY MEDICINE

## 2022-05-02 RX ORDER — LIDOCAINE HYDROCHLORIDE 40 MG/ML
5 SOLUTION TOPICAL ONCE
Status: COMPLETED | OUTPATIENT
Start: 2022-05-02 | End: 2022-05-02

## 2022-05-02 RX ADMIN — LIDOCAINE HYDROCHLORIDE 5 ML: 40 SOLUTION TOPICAL at 08:37

## 2022-05-02 NOTE — PROGRESS NOTES
[address] [place]  37 y o  male  [id number]     Subjective:      Patient ID: Storm Churchill is a 37 y o  male  HPI    Patient is a 76-year-old morbidly obese male here for follow-up on his blood pressure  Patient was also recently started on Victoza injections  Patient currently reports no side effects on medications including headache, chest pain, palpitations, nausea, vomiting, or abdominal pain  Patient states "I feel like a million bucks"  Patient is also following with wound care for cellulitis  Review of Systems    See HPI    Objective:    /82 (BP Location: Left arm, Patient Position: Sitting, Cuff Size: Adult)   Pulse 85   Temp 97 5 °F (36 4 °C) (Tympanic)   Resp 20   Ht 5' 7" (1 702 m)   Wt (!) 174 kg (383 lb)   SpO2 94%   BMI 59 99 kg/m²        Physical Exam  Vitals and nursing note reviewed  Constitutional:       General: He is not in acute distress  Appearance: He is obese  He is not ill-appearing, toxic-appearing or diaphoretic  HENT:      Head: Normocephalic and atraumatic  Cardiovascular:      Rate and Rhythm: Normal rate and regular rhythm  Pulses: Normal pulses  Heart sounds: Normal heart sounds  Pulmonary:      Effort: Pulmonary effort is normal  No respiratory distress  Breath sounds: Normal breath sounds  Abdominal:      Palpations: Abdomen is soft  Tenderness: There is no abdominal tenderness  Musculoskeletal:      Right lower leg: Edema present  Left lower leg: Edema present  Skin:     Comments: Normal diabetic foot exam bilaterally  Neurological:      Mental Status: He is alert  Gait: Gait normal            Assessment/Plan:    Blood pressure is wonderfully controlled with lisinopril 20 mg daily  Patient was encouraged to complete his CMP soon  Patient to follow-up in 1 month for Victoza monitoring  Patient has lost 5 lb since last visit 2 weeks ago       Diagnoses and all orders for this visit:    Essential hypertension  - /82 today  Continue with good lifestyle practices including avoiding salt in his diet  Encounter for diabetic foot exam ([facility] )    Type 2 diabetes mellitus with diabetic polyneuropathy, without long-term current use of insulin ([facility] )         RTC 4 weeks   The patient was counseled regarding diagnostic results, instructions for management, risk factor reductions, prognosis, patient and family education, impressions, risks and benefits of treatment options  The treatment plan was reviewed with the patient/guardian  The patient/guardian understands and agrees with the treatment plan  --  Lewis Lugo, DO    "This note has been constructed using a voice recognition system  Therefore there may be syntax, spelling, and/or grammatical errors   Please call if you have any questions "

## 2022-05-02 NOTE — PROGRESS NOTES
Patient ID: Audrey Bowen is a 37 y o  male Date of Birth 1980     Chief Complaint  Chief Complaint   Patient presents with    Follow Up Wound Care Visit     abdomen, left leg       Allergies  Amoxicillin    Assessment:     Diagnoses and all orders for this visit:    Non-healing surgical wound, initial encounter  -     lidocaine (XYLOCAINE) 4 % topical solution 5 mL  -     Wound cleansing and dressings; Future  -     Wound cleansing and dressings; Future  -     Wound cleansing and dressings; Future  -     Debridement  -     Debridement    Type 2 diabetes mellitus with diabetic polyneuropathy, without long-term current use of insulin (HCC)  -     lidocaine (XYLOCAINE) 4 % topical solution 5 mL  -     Wound cleansing and dressings; Future  -     Wound cleansing and dressings; Future  -     Wound cleansing and dressings; Future    Morbid obesity due to excess calories (HCC)  -     lidocaine (XYLOCAINE) 4 % topical solution 5 mL  -     Wound cleansing and dressings; Future  -     Wound cleansing and dressings; Future  -     Wound cleansing and dressings; Future    Lymphedema  -     lidocaine (XYLOCAINE) 4 % topical solution 5 mL  -     Wound cleansing and dressings; Future  -     Wound cleansing and dressings; Future  -     Wound cleansing and dressings; Future              Debridement   Wound 03/07/22 Surgical Leg Left;Upper;Medial    Universal Protocol:  Consent: Written consent obtained  Consent given by: patient  Time out: Immediately prior to procedure a "time out" was called to verify the correct patient, procedure, equipment, support staff and site/side marked as required    Timeout called at: 5/2/2022 9:09 AM   Patient identity confirmed: verbally with patient      Performed by: NP  Debridement type: selective  Pain control: lidocaine 4%  Pre-debridement measurements  Length (cm): 3 5  Width (cm): 13 5  Depth (cm): 0 1  Surface Area (cm^2): 47 25  Volume (cm^3): 4 73    Post-debridement measurements  Length (cm): 3 5  Width (cm): 13 5  Depth (cm): 0 1  Percent debrided: 100%  Surface Area (cm^2): 47 25  Area debrided (cm^2): 47 25  Volume (cm^3): 4 73  Devitalized tissue debrided: biofilm, fibrin and slough  Instrument(s) utilized: curette  Bleeding: small  Hemostasis obtained with: pressure  Procedural pain (0-10): 0  Post-procedural pain: 0   Response to treatment: procedure was tolerated well    Debridement   Wound 04/11/22 Incision Abdomen Left;Medial;Lower    Universal Protocol:  Consent: Written consent obtained  Consent given by: patient  Time out: Immediately prior to procedure a "time out" was called to verify the correct patient, procedure, equipment, support staff and site/side marked as required  Timeout called at: 5/2/2022 9:11 AM   Patient identity confirmed: verbally with patient      Performed by: NP  Debridement type: selective  Pain control: lidocaine 4%  Pre-debridement measurements  Length (cm): 0 8  Width (cm): 5  Depth (cm): 0 6  Surface Area (cm^2): 4  Volume (cm^3): 2 4    Post-debridement measurements  Length (cm): 0 8  Width (cm): 5  Depth (cm): 0 6  Percent debrided: 100%  Surface Area (cm^2): 4  Area debrided (cm^2): 4  Volume (cm^3): 2 4  Devitalized tissue debrided: biofilm, fibrin and slough  Instrument(s) utilized: curette  Bleeding: small  Hemostasis obtained with: pressure  Procedural pain (0-10): 0  Post-procedural pain: 0   Response to treatment: procedure was tolerated well          Plan:  1  F/u visit  Wounds debrided  Thigh wound improving and measuring smaller  Continue current plan of care  2  Patient still has extensive leg and thigh edema d/t his lymphedema that will make healing his wound difficult  Will try to get patient approved for lymphedema pumps  3  Abdominal wound is measuring larger d/t developing 2 additional openings adjacent to initial wound  Periwound still feels indurated but no erythema present in periwound   Patient reports he is unable to pack wound d/t minimal depth  Will change treatment to Maxorb Ag applied on top of wounds covered with ABD and tape changed daily  4  Patient will follow up in 2 weeks  Wound 03/07/22 Surgical Leg Left;Upper;Medial (Active)   Wound Image Images linked 05/02/22 0840   Wound Description Pink;Granulation tissue; Yellow;Slough; Epithelialization 05/02/22 0838   Sarika-wound Assessment Edema;Pink;Scar Tissue 05/02/22 0838   Wound Length (cm) 3 5 cm 05/02/22 0838   Wound Width (cm) 13 5 cm 05/02/22 0838   Wound Depth (cm) 0 1 cm 05/02/22 0838   Wound Surface Area (cm^2) 47 25 cm^2 05/02/22 0838   Wound Volume (cm^3) 4 725 cm^3 05/02/22 0838   Calculated Wound Volume (cm^3) 4 73 cm^3 05/02/22 0838   Change in Wound Size % 19 56 05/02/22 0838   Drainage Amount Moderate 05/02/22 0838   Drainage Description Serous; Yellow 05/02/22 0838   Non-staged Wound Description Full thickness 05/02/22 0838   Dressing Status Intact; Old drainage (upon arrival) 05/02/22 0838       Wound 04/11/22 Incision Abdomen Left;Medial;Lower (Active)   Wound Image Images linked 05/02/22 0833   Wound Description Granulation tissue;Pink 05/02/22 0832   Sarika-wound Assessment Induration;Pink 05/02/22 0832   Wound Length (cm) 0 8 cm 05/02/22 0832   Wound Width (cm) 5 cm (3 wounds) 05/02/22 0832   Wound Depth (cm) 0 6 cm 05/02/22 0832   Wound Surface Area (cm^2) 4 cm^2 05/02/22 0832   Wound Volume (cm^3) 2 4 cm^3 05/02/22 0832   Calculated Wound Volume (cm^3) 2 4 cm^3 05/02/22 0832   Change in Wound Size % -1400 05/02/22 0832   Drainage Amount Moderate 05/02/22 0832   Drainage Description Serosanguineous;Purulent 05/02/22 0832   Non-staged Wound Description Full thickness 05/02/22 1986   Dressing Status Other (Comment) (no dressing upon arrival) 05/02/22 0832       Wound 03/07/22 Surgical Leg Left;Upper;Medial (Active)   Date First Assessed/Time First Assessed: 03/07/22 0833   Primary Wound Type: Surgical  Location: Leg  Wound Location Orientation: Left;Upper;Medial  Wound Outcome: (c)        Wound 04/11/22 Incision Abdomen Left;Medial;Lower (Active)   Date First Assessed: 04/11/22   Primary Wound Type: Incision  Location: Abdomen  Wound Location Orientation: Left;Medial;Lower       [REMOVED] Wound 05/26/21 Incision Thigh Left (Removed)   Resolved Date: 03/07/22  Date First Assessed/Time First Assessed: 05/26/21 1914   Pre-Existing Wound: Yes  Primary Wound Type: Incision  Location: Thigh  Wound Location Orientation: Left  Wound Description (Comments): 2 incisions  Incision's 1st Dress    [REMOVED] Wound 05/26/21 Surgical Other (Comment) Groin Left (Removed)   Resolved Date: 03/07/22  Date First Assessed/Time First Assessed: 05/26/21 1916   Pre-Existing Wound: Yes  Primary Wound Type: Surgical  Traumatic Wound Type: (c) Other (Comment)  Location: Groin  Wound Location Orientation: Left  Wound Outcome: Unkno    [REMOVED] Wound 06/01/21 Other (comment) Cellulitis Pretibial Left (Removed)   Resolved Date: 03/07/22  Date First Assessed/Time First Assessed: 06/01/21 1930   Pre-Existing Wound: Yes  Primary Wound Type: Other (comment)  Traumatic Wound Type: Cellulitis  Location: Pretibial  Wound Location Orientation: Left  Dressing Status: C         [REMOVED] Wound 06/02/21 Hip Anterior;Right (Removed)   Resolved Date: 03/07/22  Date First Assessed/Time First Assessed: 06/02/21 1028   Location: Hip  Wound Location Orientation: Anterior;Right  Wound Outcome: Unknown (No longer present)       [REMOVED] Wound 06/02/21 Sacrum (Removed)   Resolved Date: 03/07/22  Date First Assessed/Time First Assessed: 06/02/21 1034   Location: Sacrum  Wound Outcome: Unknown (No longer present)       [REMOVED] Wound 07/06/21 Leg Left (Removed)   Resolved Date: 03/07/22  Date First Assessed/Time First Assessed: 07/06/21 1639   Location: Leg  Wound Location Orientation: Left  Incision's 1st Dressing: KERLIX SUPER SPONGE 6 IN (x2), ACE WRAP 6 IN UNSTERILE (x4), SPONGE GAUZE 4 X 4 16 PLY STRL FÁTIMA    [REMOVED] Wound 03/30/22 Sacrum (Removed)   Resolved Date: 04/11/22  Date First Assessed/Time First Assessed: 03/30/22 1610   Location: Sacrum  Wound Description (Comments): 4/1/22 Stage 2 POA  Wound Outcome: (c) Other (Comment)       [REMOVED] Wound 03/30/22 Surgical Thigh Anterior; Left (Removed)   Resolved Date: 04/11/22  Date First Assessed/Time First Assessed: 03/30/22 1614   Pre-Existing Wound: Yes  Primary Wound Type: Surgical  Location: Thigh  Wound Location Orientation: Anterior; Left  Wound Description (Comments): Surgical Wound  Dressing    [REMOVED] Wound 03/30/22 Abdomen Left (Removed)   Resolved Date: 04/11/22  Date First Assessed/Time First Assessed: 03/30/22 1616   Location: Abdomen  Wound Location Orientation: Left  Wound Outcome: (c) Other (Comment)       [REMOVED] Wound 03/30/22 Thigh Anterior;Right (Removed)   Resolved Date: 04/11/22  Date First Assessed/Time First Assessed: 03/30/22 1617   Location: Thigh  Wound Location Orientation: Anterior;Right  Wound Outcome: (c) Other (Comment)       [REMOVED] Wound 03/30/22 Abscess Cellulitis Thigh Anterior;Left;Proximal (Removed)   Resolved Date: 04/11/22  Date First Assessed/Time First Assessed: 03/30/22 1618   Primary Wound Type: Abscess  Traumatic Wound Type: Cellulitis  Location: Thigh  Wound Location Orientation: Anterior;Left;Proximal  Dressing Status: New drainage  Wound     Subjective:     F/u visit non-healing surgical wounds of his left thigh and abdomen  No new complaints  He denies any pain, fevers, or chills          The following portions of the patient's history were reviewed and updated as appropriate:   He  has a past medical history of Arthritis, Breathing difficulty, Coronary artery disease, Diabetes mellitus ([facility] ), Heart disease, Hidradenitis suppurativa, History of transfusion, Hyperlipidemia, Hypertension, MI (myocardial infarction) ([facility] ), Morbid obesity with BMI of 50 0-59 9, adult ([facility] ), Nicotine dependence, Obesity, and Pilonidal cyst   He   Patient Active Problem List    Diagnosis Date Noted    Microcytic anemia 04/01/2022    Swelling of left lower extremity 03/30/2022    Hidradenitis suppurativa 03/30/2022    Nocturnal hypoxia 12/04/2021    Cellulitis of multiple sites of trunk 12/02/2021    Chest pain 12/02/2021    Cellulitis of left lower extremity 06/23/2021    Abscess of left thigh 06/21/2021    Difficult intubation 05/28/2021    Diabetic neuropathy ([facility] ) 05/28/2021    Morbid obesity with body mass index (BMI) of 50 0 to 59 9 in adult [facility]) 03/10/2021    Acute kidney injury ([facility] ) 02/22/2021    Type 1 non-ST elevation myocardial infarction (NSTEMI) ([place] [address] ) 11/29/2020    Left groin mass 11/29/2020    Type 2 diabetes mellitus with diabetic polyneuropathy, without long-term current use of insulin ([place] [address] ) 07/26/2018    Dyslipidemia 01/23/2017    Pilonidal cyst 12/13/2016    Essential hypertension 01/14/2016    Abnormal liver enzymes 09/23/2014    Coronary artery disease 06/25/2014     He  has a past surgical history that includes Leg Surgery (Left); Coronary angioplasty with stent; Incision and drainage of wound (N/A, 1/24/2017); pr exc skin benig >4 cm trunk,arm,leg (Left, 4/6/2021); pr neg press wound tx, < 50 cm (Left, 4/6/2021); Wound debridement (Left, 4/17/2021); Wound debridement (Left, 4/22/2021); Wound debridement (Left, 5/26/2021); Wound debridement (Left, 5/28/2021); pr debridement, skin, sub-q tissue,=<20 sq cm (Left, 7/6/2021); pr exc sweat gland lesn perineal,complx (Left, 7/6/2021); and Cardiac surgery  His family history includes Diabetes in his father; Heart disease in his father and mother; Hypertension in his mother  He  reports that he quit smoking about 14 months ago  His smoking use included cigarettes  He started smoking about 15 months ago  He has a 30 00 pack-year smoking history  He has never used smokeless tobacco  He reports previous alcohol use   He reports that he does not use drugs  Current Outpatient Medications   Medication Sig Dispense Refill    acetaminophen (TYLENOL) 325 mg tablet Take 2 tablets (650 mg total) by mouth every 6 (six) hours as needed for mild pain, moderate pain or headaches (Patient not taking: Reported on 4/20/2022 ) 30 tablet 0    albuterol (ProAir HFA) 90 mcg/act inhaler Inhale 2 puffs every 6 (six) hours as needed for wheezing 8 5 g 3    amLODIPine (NORVASC) 10 mg tablet Take 1 tablet (10 mg total) by mouth daily 90 tablet 1    aspirin (ECOTRIN LOW STRENGTH) 81 mg EC tablet Take 1 tablet (81 mg total) by mouth daily 30 tablet 0    atorvastatin (LIPITOR) 80 mg tablet Take 1 tablet (80 mg total) by mouth daily 90 tablet 1    carvedilol (COREG) 6 25 mg tablet Take 1 tablet (6 25 mg total) by mouth 2 (two) times a day with meals 180 tablet 1    Insulin Pen Needle (PEN NEEDLES 29GX1/2") 29G X 12MM MISC Use 1 needle daily for subcutaneous injections 50 each 3    liraglutide (VICTOZA) injection Inject 0 1 mL (0 6 mg total) under the skin daily Start at 0 6 mg daily for a week, then increase to 1 2 mg daily on the second week, then increase to 1 8 mg daily on the third week and going forward   3 mL 3    lisinopril (ZESTRIL) 20 mg tablet Take 1 tablet (20 mg total) by mouth daily 90 tablet 1    metFORMIN (GLUCOPHAGE) 1000 MG tablet Take 1 tablet (1,000 mg total) by mouth 2 (two) times a day with meals 180 tablet 1    Omega-3 Fatty Acids (fish oil) 1,000 mg Take 1 capsule (1,000 mg total) by mouth 2 (two) times a day 180 capsule 2    ticagrelor (Brilinta) 90 MG Take 1 tablet (90 mg total) by mouth every 12 (twelve) hours (Patient not taking: Reported on 5/2/2022 ) 180 tablet 2    zinc sulfate (ZINCATE) 220 mg capsule Take 1 capsule (220 mg total) by mouth daily 30 capsule 0     Current Facility-Administered Medications   Medication Dose Route Frequency Provider Last Rate Last Admin    lidocaine (XYLOCAINE) 4 % topical solution 5 mL  5 mL Topical Once [address] [facility]         He is allergic to amoxicillin       Review of Systems   Constitutional: Negative  HENT: Negative for ear pain and hearing loss  Eyes: Negative for pain  Respiratory: Negative for chest tightness and shortness of breath  Cardiovascular: Positive for leg swelling  Negative for chest pain and palpitations  Gastrointestinal: Negative for diarrhea, nausea and vomiting  Genitourinary: Negative for dysuria  Musculoskeletal: Negative for gait problem  Skin: Positive for wound  Neurological: Negative for tremors and weakness  Psychiatric/Behavioral: Negative for behavioral problems, confusion and suicidal ideas  Objective:       Wound 03/07/22 Surgical Leg Left;Upper;Medial (Active)   Wound Image Images linked 05/02/22 0840   Wound Description Pink;Granulation tissue; Yellow;Slough; Epithelialization 05/02/22 0838   Sarika-wound Assessment Edema;Pink;Scar Tissue 05/02/22 0838   Wound Length (cm) 3 5 cm 05/02/22 0838   Wound Width (cm) 13 5 cm 05/02/22 0838   Wound Depth (cm) 0 1 cm 05/02/22 0838   Wound Surface Area (cm^2) 47 25 cm^2 05/02/22 0838   Wound Volume (cm^3) 4 725 cm^3 05/02/22 0838   Calculated Wound Volume (cm^3) 4 73 cm^3 05/02/22 0838   Change in Wound Size % 19 56 05/02/22 0838   Drainage Amount Moderate 05/02/22 0838   Drainage Description Serous; Yellow 05/02/22 0838   Non-staged Wound Description Full thickness 05/02/22 0838   Dressing Status Intact; Old drainage (upon arrival) 05/02/22 0838       Wound 04/11/22 Incision Abdomen Left;Medial;Lower (Active)   Wound Image Images linked 05/02/22 0833   Wound Description Granulation tissue;Pink 05/02/22 0832   Sarika-wound Assessment Induration;Pink 05/02/22 0832   Wound Length (cm) 0 8 cm 05/02/22 0832   Wound Width (cm) 5 cm (3 wounds) 05/02/22 0832   Wound Depth (cm) 0 6 cm 05/02/22 0832   Wound Surface Area (cm^2) 4 cm^2 05/02/22 0832   Wound Volume (cm^3) 2 4 cm^3 05/02/22 4777 Calculated Wound Volume (cm^3) 2 4 cm^3 05/02/22 0832   Change in Wound Size % -1400 05/02/22 0832   Drainage Amount Moderate 05/02/22 0832   Drainage Description Serosanguineous;Purulent 05/02/22 0832   Non-staged Wound Description Full thickness 05/02/22 7363   Dressing Status Other (Comment) (no dressing upon arrival) 05/02/22 0832       /81   Pulse 76   Temp 97 6 °F (36 4 °C)   Resp 15                 Wound Instructions:  Orders Placed This Encounter   Procedures    Wound cleansing and dressings     Standing Status:   Future     Standing Expiration Date:   5/2/2023    Wound cleansing and dressings     Left Thigh Wound:     Wash your hands with soap and water  Remove old dressing, discard into plastic bag and place in trash  Cleanse the wound with soap and water prior to applying a clean dressing  Do not use tissue or cotton balls  Do not scrub the wound  Pat dry using gauze  Shower yes on wound care days   Apply skin prep to skin surrounding wound  Apply Mesalt to the left thigh wound  Cover with ABD  Secure with tape  Change dressing daily and as needed     This was done today              Standing Status:   Future     Standing Expiration Date:   5/2/2023    Wound cleansing and dressings     Abdominal wound:     Wash your hands with soap and water  Remove old dressing, discard into plastic bag and place in trash  Cleanse the wound with soap and water prior to applying a clean dressing  Do not use tissue or cotton balls  Do not scrub the wound  Pat dry using gauze  Shower yes   Apply Maxorb AG to the  wound    Cover with ABD  Secure with medipore tape  Change dressing daily and as needed     This was done today    Apply warm compresses to abdominal wounds 3-4 times per day for 10-15 minutes           Standing Status:   Future     Standing Expiration Date:   5/2/2023    Debridement     This order was created via procedure documentation    Debridement     This order was created via procedure documentation        Diagnosis ICD-10-CM Associated Orders   1  Non-healing surgical wound, initial encounter  T81 89XA lidocaine (XYLOCAINE) 4 % topical solution 5 mL     Wound cleansing and dressings     Wound cleansing and dressings     Wound cleansing and dressings     Debridement     Debridement   2  Type 2 diabetes mellitus with diabetic polyneuropathy, without long-term current use of insulin ([facility])  E11 42 lidocaine (XYLOCAINE) 4 % topical solution 5 mL     Wound cleansing and dressings     Wound cleansing and dressings     Wound cleansing and dressings   3  Morbid obesity due to excess calories ([facility])  E66 01 lidocaine (XYLOCAINE) 4 % topical solution 5 mL     Wound cleansing and dressings     Wound cleansing and dressings     Wound cleansing and dressings   4   Lymphedema  I89 0 lidocaine (XYLOCAINE) 4 % topical solution 5 mL     Wound cleansing and dressings     Wound cleansing and dressings     Wound cleansing and dressings

## 2022-05-02 NOTE — PATIENT INSTRUCTIONS
Orders Placed This Encounter   Procedures    Wound cleansing and dressings     Standing Status:   Future     Standing Expiration Date:   5/2/2023    Wound cleansing and dressings     Left Thigh Wound:     Wash your hands with soap and water  Remove old dressing, discard into plastic bag and place in trash  Cleanse the wound with soap and water prior to applying a clean dressing  Do not use tissue or cotton balls  Do not scrub the wound  Pat dry using gauze  Shower yes on wound care days   Apply skin prep to skin surrounding wound  Apply Mesalt to the left thigh wound  Cover with ABD  Secure with tape  Change dressing daily and as needed     This was done today              Standing Status:   Future     Standing Expiration Date:   5/2/2023    Wound cleansing and dressings     Abdominal wound:     Wash your hands with soap and water  Remove old dressing, discard into plastic bag and place in trash  Cleanse the wound with soap and water prior to applying a clean dressing  Do not use tissue or cotton balls  Do not scrub the wound  Pat dry using gauze  Shower yes   Apply Maxorb AG to the  wound    Cover with ABD  Secure with medipore tape  Change dressing daily and as needed     This was done today    Apply warm compresses to abdominal wounds 3-4 times per day for 10-15 minutes           Standing Status:   Future     Standing Expiration Date:   5/2/2023

## 2022-05-04 ENCOUNTER — TELEPHONE (OUTPATIENT)
Dept: FAMILY MEDICINE CLINIC | Facility: CLINIC | Age: 42
End: 2022-05-04

## 2022-05-04 ENCOUNTER — TELEPHONE (OUTPATIENT)
Dept: OTHER | Facility: HOSPITAL | Age: 42
End: 2022-05-04

## 2022-05-04 ENCOUNTER — HOSPITAL ENCOUNTER (INPATIENT)
Facility: HOSPITAL | Age: 42
LOS: 1 days | Discharge: HOME | DRG: 872 | End: 2022-05-07
Attending: EMERGENCY MEDICINE | Admitting: FAMILY MEDICINE
Payer: COMMERCIAL

## 2022-05-04 ENCOUNTER — APPOINTMENT (EMERGENCY)
Dept: RADIOLOGY | Facility: HOSPITAL | Age: 42
DRG: 872 | End: 2022-05-04
Payer: COMMERCIAL

## 2022-05-04 DIAGNOSIS — K52.9 COLITIS: Primary | ICD-10-CM

## 2022-05-04 DIAGNOSIS — L02.31 ABSCESS OF MULTIPLE SITES OF BUTTOCK: ICD-10-CM

## 2022-05-04 DIAGNOSIS — L03.319: ICD-10-CM

## 2022-05-04 PROBLEM — A41.9 SEPSIS (HCC): Status: ACTIVE | Noted: 2022-05-04

## 2022-05-04 PROBLEM — E11.40 DIABETIC NEUROPATHY (HCC): Status: RESOLVED | Noted: 2021-05-28 | Resolved: 2022-05-04

## 2022-05-04 PROBLEM — Z98.890 H/O DRAINAGE OF ABSCESS: Status: ACTIVE | Noted: 2021-06-21

## 2022-05-04 LAB
ALBUMIN SERPL BCP-MCNC: 2.7 G/DL (ref 3.5–5)
ALP SERPL-CCNC: 99 U/L (ref 46–116)
ALT SERPL W P-5'-P-CCNC: 16 U/L (ref 12–78)
ANION GAP SERPL CALCULATED.3IONS-SCNC: 7 MMOL/L (ref 4–13)
AST SERPL W P-5'-P-CCNC: 15 U/L (ref 5–45)
BASOPHILS # BLD AUTO: 0.04 THOUSANDS/ΜL (ref 0–0.1)
BASOPHILS NFR BLD AUTO: 0 % (ref 0–1)
BILIRUB SERPL-MCNC: 0.35 MG/DL (ref 0.2–1)
BUN SERPL-MCNC: 16 MG/DL (ref 5–25)
CALCIUM ALBUM COR SERPL-MCNC: 9.6 MG/DL (ref 8.3–10.1)
CALCIUM SERPL-MCNC: 8.6 MG/DL (ref 8.3–10.1)
CHLORIDE SERPL-SCNC: 97 MMOL/L (ref 100–108)
CO2 SERPL-SCNC: 32 MMOL/L (ref 21–32)
CREAT SERPL-MCNC: 1.09 MG/DL (ref 0.6–1.3)
EOSINOPHIL # BLD AUTO: 0.11 THOUSAND/ΜL (ref 0–0.61)
EOSINOPHIL NFR BLD AUTO: 1 % (ref 0–6)
ERYTHROCYTE [DISTWIDTH] IN BLOOD BY AUTOMATED COUNT: 19.6 % (ref 11.6–15.1)
FLUAV RNA RESP QL NAA+PROBE: NEGATIVE
FLUBV RNA RESP QL NAA+PROBE: NEGATIVE
GFR SERPL CREATININE-BSD FRML MDRD: 84 ML/MIN/1.73SQ M
GLUCOSE SERPL-MCNC: 137 MG/DL (ref 65–140)
GLUCOSE SERPL-MCNC: 212 MG/DL (ref 65–140)
HCT VFR BLD AUTO: 43.4 % (ref 36.5–49.3)
HGB BLD-MCNC: 13.2 G/DL (ref 12–17)
IMM GRANULOCYTES # BLD AUTO: 0.07 THOUSAND/UL (ref 0–0.2)
IMM GRANULOCYTES NFR BLD AUTO: 0 % (ref 0–2)
LACTATE SERPL-SCNC: 0.7 MMOL/L (ref 0.5–2)
LIPASE SERPL-CCNC: 81 U/L (ref 73–393)
LYMPHOCYTES # BLD AUTO: 1.36 THOUSANDS/ΜL (ref 0.6–4.47)
LYMPHOCYTES NFR BLD AUTO: 8 % (ref 14–44)
MCH RBC QN AUTO: 24.2 PG (ref 26.8–34.3)
MCHC RBC AUTO-ENTMCNC: 30.4 G/DL (ref 31.4–37.4)
MCV RBC AUTO: 80 FL (ref 82–98)
MONOCYTES # BLD AUTO: 1 THOUSAND/ΜL (ref 0.17–1.22)
MONOCYTES NFR BLD AUTO: 6 % (ref 4–12)
NEUTROPHILS # BLD AUTO: 13.93 THOUSANDS/ΜL (ref 1.85–7.62)
NEUTS SEG NFR BLD AUTO: 85 % (ref 43–75)
NRBC BLD AUTO-RTO: 0 /100 WBCS
PLATELET # BLD AUTO: 344 THOUSANDS/UL (ref 149–390)
PMV BLD AUTO: 9.1 FL (ref 8.9–12.7)
POTASSIUM SERPL-SCNC: 4 MMOL/L (ref 3.5–5.3)
PROT SERPL-MCNC: 8.2 G/DL (ref 6.4–8.2)
RBC # BLD AUTO: 5.45 MILLION/UL (ref 3.88–5.62)
RSV RNA RESP QL NAA+PROBE: NEGATIVE
SARS-COV-2 RNA RESP QL NAA+PROBE: NEGATIVE
SODIUM SERPL-SCNC: 136 MMOL/L (ref 136–145)
WBC # BLD AUTO: 16.51 THOUSAND/UL (ref 4.31–10.16)

## 2022-05-04 PROCEDURE — 96376 TX/PRO/DX INJ SAME DRUG ADON: CPT

## 2022-05-04 PROCEDURE — 99285 EMERGENCY DEPT VISIT HI MDM: CPT | Performed by: EMERGENCY MEDICINE

## 2022-05-04 PROCEDURE — 0241U HB NFCT DS VIR RESP RNA 4 TRGT: CPT | Performed by: EMERGENCY MEDICINE

## 2022-05-04 PROCEDURE — G1004 CDSM NDSC: HCPCS

## 2022-05-04 PROCEDURE — 36415 COLL VENOUS BLD VENIPUNCTURE: CPT | Performed by: EMERGENCY MEDICINE

## 2022-05-04 PROCEDURE — 85025 COMPLETE CBC W/AUTO DIFF WBC: CPT | Performed by: EMERGENCY MEDICINE

## 2022-05-04 PROCEDURE — 96361 HYDRATE IV INFUSION ADD-ON: CPT

## 2022-05-04 PROCEDURE — 82948 REAGENT STRIP/BLOOD GLUCOSE: CPT

## 2022-05-04 PROCEDURE — 83690 ASSAY OF LIPASE: CPT | Performed by: EMERGENCY MEDICINE

## 2022-05-04 PROCEDURE — 80053 COMPREHEN METABOLIC PANEL: CPT | Performed by: EMERGENCY MEDICINE

## 2022-05-04 PROCEDURE — 96375 TX/PRO/DX INJ NEW DRUG ADDON: CPT

## 2022-05-04 PROCEDURE — 74177 CT ABD & PELVIS W/CONTRAST: CPT

## 2022-05-04 PROCEDURE — 99285 EMERGENCY DEPT VISIT HI MDM: CPT

## 2022-05-04 PROCEDURE — 96374 THER/PROPH/DIAG INJ IV PUSH: CPT

## 2022-05-04 PROCEDURE — 83605 ASSAY OF LACTIC ACID: CPT | Performed by: EMERGENCY MEDICINE

## 2022-05-04 PROCEDURE — 87081 CULTURE SCREEN ONLY: CPT | Performed by: STUDENT IN AN ORGANIZED HEALTH CARE EDUCATION/TRAINING PROGRAM

## 2022-05-04 RX ORDER — KETOROLAC TROMETHAMINE 30 MG/ML
15 INJECTION, SOLUTION INTRAMUSCULAR; INTRAVENOUS ONCE
Status: COMPLETED | OUTPATIENT
Start: 2022-05-04 | End: 2022-05-04

## 2022-05-04 RX ORDER — ONDANSETRON 2 MG/ML
4 INJECTION INTRAMUSCULAR; INTRAVENOUS ONCE
Status: COMPLETED | OUTPATIENT
Start: 2022-05-04 | End: 2022-05-04

## 2022-05-04 RX ORDER — LISINOPRIL 20 MG/1
20 TABLET ORAL DAILY
Status: DISCONTINUED | OUTPATIENT
Start: 2022-05-05 | End: 2022-05-07 | Stop reason: HOSPADM

## 2022-05-04 RX ORDER — KETOROLAC TROMETHAMINE 30 MG/ML
15 INJECTION, SOLUTION INTRAMUSCULAR; INTRAVENOUS EVERY 6 HOURS PRN
Status: DISCONTINUED | OUTPATIENT
Start: 2022-05-05 | End: 2022-05-05

## 2022-05-04 RX ORDER — ATORVASTATIN CALCIUM 80 MG/1
80 TABLET, FILM COATED ORAL
Status: DISCONTINUED | OUTPATIENT
Start: 2022-05-05 | End: 2022-05-07 | Stop reason: HOSPADM

## 2022-05-04 RX ORDER — CIPROFLOXACIN 2 MG/ML
400 INJECTION, SOLUTION INTRAVENOUS ONCE
Status: DISCONTINUED | OUTPATIENT
Start: 2022-05-04 | End: 2022-05-04

## 2022-05-04 RX ORDER — ACETAMINOPHEN 325 MG/1
650 TABLET ORAL EVERY 6 HOURS PRN
Status: DISCONTINUED | OUTPATIENT
Start: 2022-05-04 | End: 2022-05-07 | Stop reason: HOSPADM

## 2022-05-04 RX ORDER — INSULIN LISPRO 100 [IU]/ML
2-12 INJECTION, SOLUTION INTRAVENOUS; SUBCUTANEOUS
Status: DISCONTINUED | OUTPATIENT
Start: 2022-05-04 | End: 2022-05-07 | Stop reason: HOSPADM

## 2022-05-04 RX ORDER — AMLODIPINE BESYLATE 10 MG/1
10 TABLET ORAL DAILY
Status: DISCONTINUED | OUTPATIENT
Start: 2022-05-05 | End: 2022-05-07 | Stop reason: HOSPADM

## 2022-05-04 RX ORDER — MORPHINE SULFATE 4 MG/ML
4 INJECTION, SOLUTION INTRAMUSCULAR; INTRAVENOUS ONCE
Status: COMPLETED | OUTPATIENT
Start: 2022-05-04 | End: 2022-05-04

## 2022-05-04 RX ORDER — FAMOTIDINE 10 MG/ML
20 INJECTION, SOLUTION INTRAVENOUS ONCE
Status: COMPLETED | OUTPATIENT
Start: 2022-05-04 | End: 2022-05-04

## 2022-05-04 RX ORDER — ASPIRIN 81 MG/1
81 TABLET ORAL DAILY
Status: DISCONTINUED | OUTPATIENT
Start: 2022-05-05 | End: 2022-05-07 | Stop reason: HOSPADM

## 2022-05-04 RX ORDER — SODIUM CHLORIDE 9 MG/ML
125 INJECTION, SOLUTION INTRAVENOUS CONTINUOUS
Status: DISCONTINUED | OUTPATIENT
Start: 2022-05-04 | End: 2022-05-04

## 2022-05-04 RX ORDER — HYDROMORPHONE HCL/PF 1 MG/ML
1 SYRINGE (ML) INJECTION ONCE
Status: COMPLETED | OUTPATIENT
Start: 2022-05-04 | End: 2022-05-04

## 2022-05-04 RX ORDER — ONDANSETRON 2 MG/ML
4 INJECTION INTRAMUSCULAR; INTRAVENOUS EVERY 6 HOURS PRN
Status: DISCONTINUED | OUTPATIENT
Start: 2022-05-04 | End: 2022-05-07 | Stop reason: HOSPADM

## 2022-05-04 RX ORDER — CARVEDILOL 6.25 MG/1
6.25 TABLET ORAL 2 TIMES DAILY WITH MEALS
Status: DISCONTINUED | OUTPATIENT
Start: 2022-05-05 | End: 2022-05-07 | Stop reason: HOSPADM

## 2022-05-04 RX ORDER — ALBUTEROL SULFATE 90 UG/1
2 AEROSOL, METERED RESPIRATORY (INHALATION) EVERY 6 HOURS PRN
Status: DISCONTINUED | OUTPATIENT
Start: 2022-05-04 | End: 2022-05-07 | Stop reason: HOSPADM

## 2022-05-04 RX ORDER — ACETAMINOPHEN 325 MG/1
975 TABLET ORAL ONCE
Status: COMPLETED | OUTPATIENT
Start: 2022-05-04 | End: 2022-05-04

## 2022-05-04 RX ORDER — ZINC SULFATE 50(220)MG
220 CAPSULE ORAL DAILY
Status: DISCONTINUED | OUTPATIENT
Start: 2022-05-05 | End: 2022-05-07 | Stop reason: HOSPADM

## 2022-05-04 RX ORDER — ENOXAPARIN SODIUM 100 MG/ML
60 INJECTION SUBCUTANEOUS 2 TIMES DAILY
Status: DISCONTINUED | OUTPATIENT
Start: 2022-05-04 | End: 2022-05-07 | Stop reason: HOSPADM

## 2022-05-04 RX ORDER — POTASSIUM CHLORIDE AND SODIUM CHLORIDE 450; 150 MG/100ML; MG/100ML
125 INJECTION, SOLUTION INTRAVENOUS CONTINUOUS
Status: DISCONTINUED | OUTPATIENT
Start: 2022-05-04 | End: 2022-05-05

## 2022-05-04 RX ORDER — METRONIDAZOLE 500 MG/100ML
500 INJECTION, SOLUTION INTRAVENOUS EVERY 8 HOURS
Status: DISCONTINUED | OUTPATIENT
Start: 2022-05-04 | End: 2022-05-04

## 2022-05-04 RX ORDER — CHLORAL HYDRATE 500 MG
1000 CAPSULE ORAL 2 TIMES DAILY
Status: DISCONTINUED | OUTPATIENT
Start: 2022-05-04 | End: 2022-05-07 | Stop reason: HOSPADM

## 2022-05-04 RX ADMIN — ONDANSETRON 4 MG: 2 INJECTION INTRAMUSCULAR; INTRAVENOUS at 17:10

## 2022-05-04 RX ADMIN — IOHEXOL 100 ML: 350 INJECTION, SOLUTION INTRAVENOUS at 18:23

## 2022-05-04 RX ADMIN — ACETAMINOPHEN 975 MG: 325 TABLET, FILM COATED ORAL at 17:21

## 2022-05-04 RX ADMIN — MORPHINE SULFATE 4 MG: 4 INJECTION INTRAVENOUS at 18:42

## 2022-05-04 RX ADMIN — ONDANSETRON 4 MG: 2 INJECTION INTRAMUSCULAR; INTRAVENOUS at 18:39

## 2022-05-04 RX ADMIN — POTASSIUM CHLORIDE AND SODIUM CHLORIDE 125 ML/HR: 450; 150 INJECTION, SOLUTION INTRAVENOUS at 22:44

## 2022-05-04 RX ADMIN — HYDROMORPHONE HYDROCHLORIDE 1 MG: 1 INJECTION, SOLUTION INTRAMUSCULAR; INTRAVENOUS; SUBCUTANEOUS at 19:47

## 2022-05-04 RX ADMIN — KETOROLAC TROMETHAMINE 15 MG: 30 INJECTION, SOLUTION INTRAMUSCULAR at 17:14

## 2022-05-04 RX ADMIN — ENOXAPARIN SODIUM 60 MG: 60 INJECTION SUBCUTANEOUS at 21:28

## 2022-05-04 RX ADMIN — OMEGA-3 FATTY ACIDS CAP 1000 MG 1000 MG: 1000 CAP at 21:28

## 2022-05-04 RX ADMIN — FAMOTIDINE 20 MG: 10 INJECTION, SOLUTION INTRAVENOUS at 17:17

## 2022-05-04 RX ADMIN — INSULIN LISPRO 4 UNITS: 100 INJECTION, SOLUTION INTRAVENOUS; SUBCUTANEOUS at 23:41

## 2022-05-04 RX ADMIN — METRONIDAZOLE 500 MG: 500 INJECTION, SOLUTION INTRAVENOUS at 19:52

## 2022-05-04 RX ADMIN — SODIUM CHLORIDE 125 ML/HR: 0.9 INJECTION, SOLUTION INTRAVENOUS at 21:27

## 2022-05-04 RX ADMIN — SODIUM CHLORIDE 1000 ML: 0.9 INJECTION, SOLUTION INTRAVENOUS at 17:07

## 2022-05-04 RX ADMIN — PIPERACILLIN AND TAZOBACTAM 3.38 G: 36; 4.5 INJECTION, POWDER, FOR SOLUTION INTRAVENOUS at 22:44

## 2022-05-04 NOTE — ED NOTES
Pt requesting pain medications at this time  Dr Del Owens made aware       Dakota Platt, RN  05/04/22 3314

## 2022-05-04 NOTE — TELEPHONE ENCOUNTER
Patient is calling regarding vomiting and diarrhea, abdominal cramping and dark red blood in his stool  He said this started Monday afternoon  He states that he is feeling slightly better today and is able to keep fluids down  He said he started Victoza about 4 days ago and is wondering if this is from the medication  Patient missed work from feeling ill and is unsure if he will be able to return tomorrow  He would like a call back to advise

## 2022-05-04 NOTE — LETTER
[address] [facility] [address]  [place]  [place] [address]  Dept: [phone]    May 7, 2022     Patient: Paul Lees   YOB: 1980   Date of Visit: 5/4/2022       To Whom it May Concern:    Ratna Arthur is under my professional care  He was seen in the hospital from 5/4/2022   to 05/07/22  He may return to work on 5/09/22 without limitations  If you have any questions or concerns, please don't hesitate to call           Sincerely,          Adolfo Garcia MD

## 2022-05-05 LAB
ALBUMIN SERPL BCP-MCNC: 2.5 G/DL (ref 3.5–5)
ALP SERPL-CCNC: 88 U/L (ref 46–116)
ALT SERPL W P-5'-P-CCNC: 13 U/L (ref 12–78)
ANION GAP SERPL CALCULATED.3IONS-SCNC: 7 MMOL/L (ref 4–13)
AST SERPL W P-5'-P-CCNC: 13 U/L (ref 5–45)
BASOPHILS # BLD AUTO: 0.06 THOUSANDS/ΜL (ref 0–0.1)
BASOPHILS NFR BLD AUTO: 1 % (ref 0–1)
BILIRUB SERPL-MCNC: 0.33 MG/DL (ref 0.2–1)
BUN SERPL-MCNC: 22 MG/DL (ref 5–25)
CALCIUM ALBUM COR SERPL-MCNC: 9.2 MG/DL (ref 8.3–10.1)
CALCIUM SERPL-MCNC: 8 MG/DL (ref 8.3–10.1)
CHLORIDE SERPL-SCNC: 99 MMOL/L (ref 100–108)
CO2 SERPL-SCNC: 30 MMOL/L (ref 21–32)
CREAT SERPL-MCNC: 1.38 MG/DL (ref 0.6–1.3)
EOSINOPHIL # BLD AUTO: 0.26 THOUSAND/ΜL (ref 0–0.61)
EOSINOPHIL NFR BLD AUTO: 2 % (ref 0–6)
ERYTHROCYTE [DISTWIDTH] IN BLOOD BY AUTOMATED COUNT: 19 % (ref 11.6–15.1)
GFR SERPL CREATININE-BSD FRML MDRD: 63 ML/MIN/1.73SQ M
GLUCOSE P FAST SERPL-MCNC: 109 MG/DL (ref 65–99)
GLUCOSE SERPL-MCNC: 109 MG/DL (ref 65–140)
GLUCOSE SERPL-MCNC: 112 MG/DL (ref 65–140)
GLUCOSE SERPL-MCNC: 149 MG/DL (ref 65–140)
GLUCOSE SERPL-MCNC: 151 MG/DL (ref 65–140)
GLUCOSE SERPL-MCNC: 96 MG/DL (ref 65–140)
HCT VFR BLD AUTO: 39.5 % (ref 36.5–49.3)
HGB BLD-MCNC: 11.7 G/DL (ref 12–17)
IMM GRANULOCYTES # BLD AUTO: 0.05 THOUSAND/UL (ref 0–0.2)
IMM GRANULOCYTES NFR BLD AUTO: 0 % (ref 0–2)
LIPASE SERPL-CCNC: 102 U/L (ref 73–393)
LYMPHOCYTES # BLD AUTO: 1.72 THOUSANDS/ΜL (ref 0.6–4.47)
LYMPHOCYTES NFR BLD AUTO: 14 % (ref 14–44)
MAGNESIUM SERPL-MCNC: 1.7 MG/DL (ref 1.6–2.6)
MCH RBC QN AUTO: 24.1 PG (ref 26.8–34.3)
MCHC RBC AUTO-ENTMCNC: 29.6 G/DL (ref 31.4–37.4)
MCV RBC AUTO: 81 FL (ref 82–98)
MONOCYTES # BLD AUTO: 0.93 THOUSAND/ΜL (ref 0.17–1.22)
MONOCYTES NFR BLD AUTO: 8 % (ref 4–12)
NEUTROPHILS # BLD AUTO: 9.38 THOUSANDS/ΜL (ref 1.85–7.62)
NEUTS SEG NFR BLD AUTO: 75 % (ref 43–75)
NRBC BLD AUTO-RTO: 0 /100 WBCS
PHOSPHATE SERPL-MCNC: 5.1 MG/DL (ref 2.7–4.5)
PLATELET # BLD AUTO: 279 THOUSANDS/UL (ref 149–390)
PMV BLD AUTO: 8.7 FL (ref 8.9–12.7)
POTASSIUM SERPL-SCNC: 3.9 MMOL/L (ref 3.5–5.3)
PROT SERPL-MCNC: 7.5 G/DL (ref 6.4–8.2)
RBC # BLD AUTO: 4.85 MILLION/UL (ref 3.88–5.62)
SODIUM SERPL-SCNC: 136 MMOL/L (ref 136–145)
WBC # BLD AUTO: 12.4 THOUSAND/UL (ref 4.31–10.16)

## 2022-05-05 PROCEDURE — 83735 ASSAY OF MAGNESIUM: CPT | Performed by: STUDENT IN AN ORGANIZED HEALTH CARE EDUCATION/TRAINING PROGRAM

## 2022-05-05 PROCEDURE — 84100 ASSAY OF PHOSPHORUS: CPT | Performed by: STUDENT IN AN ORGANIZED HEALTH CARE EDUCATION/TRAINING PROGRAM

## 2022-05-05 PROCEDURE — 85025 COMPLETE CBC W/AUTO DIFF WBC: CPT | Performed by: STUDENT IN AN ORGANIZED HEALTH CARE EDUCATION/TRAINING PROGRAM

## 2022-05-05 PROCEDURE — 80053 COMPREHEN METABOLIC PANEL: CPT

## 2022-05-05 PROCEDURE — 83690 ASSAY OF LIPASE: CPT

## 2022-05-05 PROCEDURE — 82948 REAGENT STRIP/BLOOD GLUCOSE: CPT

## 2022-05-05 PROCEDURE — 99222 1ST HOSP IP/OBS MODERATE 55: CPT | Performed by: SURGERY

## 2022-05-05 PROCEDURE — NC001 PR NO CHARGE: Performed by: FAMILY MEDICINE

## 2022-05-05 PROCEDURE — 99219 PR INITIAL OBSERVATION CARE/DAY 50 MINUTES: CPT | Performed by: FAMILY MEDICINE

## 2022-05-05 RX ORDER — KETOROLAC TROMETHAMINE 30 MG/ML
15 INJECTION, SOLUTION INTRAMUSCULAR; INTRAVENOUS EVERY 4 HOURS PRN
Status: DISCONTINUED | OUTPATIENT
Start: 2022-05-05 | End: 2022-05-05

## 2022-05-05 RX ORDER — KETOROLAC TROMETHAMINE 30 MG/ML
30 INJECTION, SOLUTION INTRAMUSCULAR; INTRAVENOUS EVERY 6 HOURS PRN
Status: DISPENSED | OUTPATIENT
Start: 2022-05-05 | End: 2022-05-06

## 2022-05-05 RX ORDER — SODIUM CHLORIDE 9 MG/ML
125 INJECTION, SOLUTION INTRAVENOUS CONTINUOUS
Status: DISCONTINUED | OUTPATIENT
Start: 2022-05-05 | End: 2022-05-07

## 2022-05-05 RX ORDER — OXYCODONE HYDROCHLORIDE 5 MG/1
5 TABLET ORAL EVERY 6 HOURS PRN
Status: DISCONTINUED | OUTPATIENT
Start: 2022-05-05 | End: 2022-05-05

## 2022-05-05 RX ORDER — DICYCLOMINE HYDROCHLORIDE 10 MG/1
20 CAPSULE ORAL ONCE
Status: COMPLETED | OUTPATIENT
Start: 2022-05-05 | End: 2022-05-05

## 2022-05-05 RX ADMIN — DICYCLOMINE HYDROCHLORIDE 20 MG: 10 CAPSULE ORAL at 20:00

## 2022-05-05 RX ADMIN — ENOXAPARIN SODIUM 60 MG: 60 INJECTION SUBCUTANEOUS at 08:38

## 2022-05-05 RX ADMIN — SODIUM CHLORIDE 125 ML/HR: 0.9 INJECTION, SOLUTION INTRAVENOUS at 18:35

## 2022-05-05 RX ADMIN — KETOROLAC TROMETHAMINE 30 MG: 30 INJECTION, SOLUTION INTRAMUSCULAR at 05:59

## 2022-05-05 RX ADMIN — INSULIN LISPRO 2 UNITS: 100 INJECTION, SOLUTION INTRAVENOUS; SUBCUTANEOUS at 11:53

## 2022-05-05 RX ADMIN — ASPIRIN 81 MG: 81 TABLET, COATED ORAL at 08:38

## 2022-05-05 RX ADMIN — POTASSIUM CHLORIDE AND SODIUM CHLORIDE 125 ML/HR: 450; 150 INJECTION, SOLUTION INTRAVENOUS at 08:38

## 2022-05-05 RX ADMIN — ENOXAPARIN SODIUM 60 MG: 60 INJECTION SUBCUTANEOUS at 17:18

## 2022-05-05 RX ADMIN — ZINC SULFATE 220 MG (50 MG) CAPSULE 220 MG: CAPSULE at 08:38

## 2022-05-05 RX ADMIN — PIPERACILLIN SODIUM,TAZOBACTAM SODIUM 4.5 G: 4; .5 INJECTION, POWDER, FOR SOLUTION INTRAVENOUS at 05:54

## 2022-05-05 RX ADMIN — CARVEDILOL 6.25 MG: 6.25 TABLET, FILM COATED ORAL at 17:19

## 2022-05-05 RX ADMIN — OMEGA-3 FATTY ACIDS CAP 1000 MG 1000 MG: 1000 CAP at 08:39

## 2022-05-05 RX ADMIN — PIPERACILLIN SODIUM,TAZOBACTAM SODIUM 4.5 G: 4; .5 INJECTION, POWDER, FOR SOLUTION INTRAVENOUS at 11:52

## 2022-05-05 RX ADMIN — PIPERACILLIN SODIUM,TAZOBACTAM SODIUM 4.5 G: 4; .5 INJECTION, POWDER, FOR SOLUTION INTRAVENOUS at 22:15

## 2022-05-05 RX ADMIN — KETOROLAC TROMETHAMINE 30 MG: 30 INJECTION, SOLUTION INTRAMUSCULAR at 17:34

## 2022-05-05 RX ADMIN — ONDANSETRON 4 MG: 2 INJECTION INTRAMUSCULAR; INTRAVENOUS at 20:00

## 2022-05-05 RX ADMIN — OXYCODONE HYDROCHLORIDE 5 MG: 5 TABLET ORAL at 18:31

## 2022-05-05 RX ADMIN — PIPERACILLIN SODIUM,TAZOBACTAM SODIUM 4.5 G: 4; .5 INJECTION, POWDER, FOR SOLUTION INTRAVENOUS at 17:18

## 2022-05-05 RX ADMIN — ATORVASTATIN CALCIUM 80 MG: 80 TABLET, FILM COATED ORAL at 17:18

## 2022-05-05 RX ADMIN — KETOROLAC TROMETHAMINE 30 MG: 30 INJECTION, SOLUTION INTRAMUSCULAR at 00:22

## 2022-05-05 RX ADMIN — OMEGA-3 FATTY ACIDS CAP 1000 MG 1000 MG: 1000 CAP at 17:19

## 2022-05-05 NOTE — ASSESSMENT & PLAN NOTE
LiPID panel 11/29/20: T chol 195, , HDL 29,   · Continue home atorvastatin 80 mg q h s  And fish oil 1000 mg b i d

## 2022-05-05 NOTE — ASSESSMENT & PLAN NOTE
H/O left thigh abscess s/p I and D  Patient regularly maintains wound care at home  Stable on admission    · Wound care consult

## 2022-05-05 NOTE — ASSESSMENT & PLAN NOTE
Satting well on admission on room air  Was found to have nocturnal hypoxia on prior admission in April, 22  Had overnight O2 study during last admission which showed significant hypoxemia with lowest desaturation of 59%  At discharge he was advised to follow-up with pulmonology for outpatient sleep study but he has not done this yet  Was also found to require oxygen with exertion during last admission and was discharged with plan for 2 L with activity    · Oxygen as needed overnight  · Monitor respiratory status   · Encourage f/u outpatient for sleep study

## 2022-05-05 NOTE — ASSESSMENT & PLAN NOTE
Patient has history of hidranitis suppurativa with wounds currently present on left groin and abdominal pannus     · Local wound care  · Antibiotics as noted

## 2022-05-05 NOTE — UTILIZATION REVIEW
Initial Clinical Review    Admission: Date/Time/Statement:   Admission Orders (From admission, onward)     Ordered        05/04/22 1934  Place in Observation  Once                      Orders Placed This Encounter   Procedures    Place in Observation     Standing Status:   Standing     Number of Occurrences:   1     Order Specific Question:   Level of Care     Answer:   Med Surg [16]     ED Arrival Information     Expected Arrival Acuity    - 5/4/2022 15:30 Urgent         Means of arrival Escorted by Service Admission type    Cynthia Perez Urgent         Arrival complaint    Abdominal Pain         Chief Complaint   Patient presents with    Abdominal Pain     Started on Monday, mid abd pain  N/V/D  No ill exposure  Initial Presentation:   21-year-old male with a PMH of morbid obesity, CAD s/p stent, HTN, DMT2 with hidradenitis suppurativa, and nocturnal hypoxia presents with generalized abdominal tenderness and cramping, nausea, vomiting, and bloody diarrhea x3 days  Denies any sick contact or recent travel  He had some questionable eggs on Monday evening  Later Monday evening and into Tuesday, he started having bloody diarrhea nausea and vomiting  He reported symptoms are little better today and able to tolerate PO  He notices couple oz of blood in toilet on Tuesday with diarrhea  Patient also started on Victoza last Thursday in addition to metformin a 1000 mg b i d  Patient was recently discharged on 2 weeks of antibiotics for cellulitis at the end of March  He states that the cellulitis of the abdomen has been healing well after completion of the antibiotics course    Colitis  · Zosyn 3 375 mg  · Stool study (C diff toxin PCR, Giardia antigen, O&P, stool PCR)  · Hold victoza and metformin  · Tylenol for mild and Toradol for moderate pain  · Half NS and K 20mEq 125 cc/hr  · Monitor CBC/vitals  · Monitor CMP and lipase      ED Triage Vitals [05/04/22 1610]   Temperature Pulse Respirations Blood Pressure SpO2   99 6 °F (37 6 °C) 94 20 (!) 182/90 92 %      Temp Source Heart Rate Source Patient Position - Orthostatic VS BP Location FiO2 (%)   Tympanic Monitor Sitting Right arm --      Pain Score       10 - Worst Possible Pain          Wt Readings from Last 1 Encounters:   05/04/22 (!) 165 kg (363 lb)     Additional Vital Signs:   05/05/22 03:12:55 97 6 °F (36 4 °C) 58 18 128/72 91 96 % None (Room air) Lying   05/04/22 23:22:59 98 7 °F (37 1 °C) 78 20 128/72 91 91 % None (Room air) Lying   05/04/22 2300 -- 87 -- -- -- 94 % -- --   05/04/22 20:28:02 98 9 °F (37 2 °C) 96 19 145/101 Abnormal  116 90 % -- --   05/04/22 2000 -- 76 20 145/76 104 95 % None (Room air) Lying     Pertinent Labs/Diagnostic Test Results:   CT abdomen pelvis with contrast   Final Result (05/04 1853)   1  Diffuse wall thickening of the left colon most suggestive of a colitis, favoring an inflammatory/infectious etiology  Correlate clinically to exclude an ischemic etiology  2  Persistent cutaneous thickening and subcutaneous infiltration in the anterior lower pelvic wall   Correlate clinically for cellulitis  3  Small rim-enhancing subcutaneous fluid collections are also noted at the level of the upper buttock crease, measuring 3 4 x 2 7 cm on the right and 2 6 x 1 8 cm on the left  Correlate for small abscesses       Results from last 7 days   Lab Units 05/04/22  1658   SARS-COV-2  Negative     Results from last 7 days   Lab Units 05/05/22  0558 05/04/22  1707   WBC Thousand/uL 12 40* 16 51*   HEMOGLOBIN g/dL 11 7* 13 2   HEMATOCRIT % 39 5 43 4   PLATELETS Thousands/uL 279 344   NEUTROS ABS Thousands/µL 9 38* 13 93*         Results from last 7 days   Lab Units 05/05/22  0558 05/04/22  1707   SODIUM mmol/L 136 136   POTASSIUM mmol/L 3 9 4 0   CHLORIDE mmol/L 99* 97*   CO2 mmol/L 30 32   ANION GAP mmol/L 7 7   BUN mg/dL 22 16   CREATININE mg/dL 1 38* 1 09   EGFR ml/min/1 73sq m 63 84   CALCIUM mg/dL 8 0* 8 6   MAGNESIUM mg/dL 1 7 --    PHOSPHORUS mg/dL 5 1*  --      Results from last 7 days   Lab Units 05/05/22  0558 05/04/22  1707   AST U/L 13 15   ALT U/L 13 16   ALK PHOS U/L 88 99   TOTAL PROTEIN g/dL 7 5 8 2   ALBUMIN g/dL 2 5* 2 7*   TOTAL BILIRUBIN mg/dL 0 33 0 35     Results from last 7 days   Lab Units 05/05/22  0719 05/04/22  2326   POC GLUCOSE mg/dl 96 212*     Results from last 7 days   Lab Units 05/05/22  0558 05/04/22  1707   GLUCOSE RANDOM mg/dL 109 137             No results found for: BETA-HYDROXYBUTYRATE                                       Results from last 7 days   Lab Units 05/04/22  1707   LACTIC ACID mmol/L 0 7                         Results from last 7 days   Lab Units 05/05/22  0558 05/04/22  1707   LIPASE u/L 102 81                     Results from last 7 days   Lab Units 05/04/22  1658   INFLUENZA A PCR  Negative   INFLUENZA B PCR  Negative   RSV PCR  Negative                                           ED Treatment:   Medication Administration from 05/04/2022 1530 to 05/04/2022 2016       Date/Time Order Dose Route Action Action by Comments     05/04/2022 1707 sodium chloride 0 9 % bolus 1,000 mL 1,000 mL Intravenous [address] Sharita Marquez RN      05/04/2022 1710 ondansetron (ZOFRAN) injection 4 mg 4 mg Intravenous Given Sharita Marquez RN      05/04/2022 1714 ketorolac (TORADOL) injection 15 mg 15 mg Intravenous Given Sharita Marquez RN      05/04/2022 1721 acetaminophen (TYLENOL) tablet 975 mg 975 mg Oral Given Sharita Marquez RN      05/04/2022 1717 Famotidine (PF) (PEPCID) injection 20 mg 20 mg Intravenous Given Sharita Marquez RN      05/04/2022 1823 iohexol (OMNIPAQUE) 350 MG/ML injection (SINGLE-DOSE) 100 mL 100 mL Intravenous Given Jayla Fowler      05/04/2022 1842 morphine (PF) 4 mg/mL injection 4 mg 4 mg Intravenous Given Sharita Marquez RN      05/04/2022 1839 ondansetron (ZOFRAN) injection 4 mg 4 mg Intravenous Given Sharita Marquez RN      05/04/2022 1952 metroNIDAZOLE (FLAGYL) IVPB (premix) 500 mg 100 mL 500 mg Intravenous [address] Ailyn Figueroa RN      05/04/2022 1947 HYDROmorphone (DILAUDID) injection 1 mg 1 mg Intravenous Given Ailyn Figueroa RN         Past Medical History:   Diagnosis Date    Arthritis     Breathing difficulty     Coronary artery disease     Diabetes mellitus ([place] [address] )     Diabetic neuropathy ([place] [address] ) 5/28/2021    Heart disease     CAD   s/p ptca with 1 stent 2012    Hidradenitis suppurativa     groin    History of transfusion     Hyperlipidemia     Hypertension     MI (myocardial infarction) ([place] [address] )     " silent " M I  in the past    Morbid obesity with BMI of 50 0-59 9, adult ([place] [address] )     Nicotine dependence     Obesity     Pilonidal cyst      Present on Admission:   Dyslipidemia   Coronary artery disease   Essential hypertension   Type 2 diabetes mellitus with diabetic polyneuropathy, without long-term current use of insulin (HCC)   Nocturnal hypoxia   Colitis   Sepsis (HCC)   Abscess of multiple sites of buttock   History of hidradenitis suppurativa   Swelling of left lower extremity    Admitting Diagnosis: Colitis [K52 9]  Abdominal pain [R10 9]  Age/Sex: 37 y o  male  Admission Orders:  scd  accuchecks   Wound care:  Site Left    Site Thigh    Wound Care Maxorb Ag    Apply Abd Pad ABD and tape     Scheduled Medications:  amLODIPine, 10 mg, Oral, Daily  aspirin, 81 mg, Oral, Daily  atorvastatin, 80 mg, Oral, Daily With Dinner  carvedilol, 6 25 mg, Oral, BID With Meals  enoxaparin, 60 mg, Subcutaneous, BID  fish oil, 1,000 mg, Oral, BID  insulin lispro, 2-12 Units, Subcutaneous, 4x Daily (AC & HS)  lisinopril, 20 mg, Oral, Daily  piperacillin-tazobactam, 4 5 g, Intravenous, Q6H  zinc sulfate, 220 mg, Oral, Daily    Continuous IV Infusions:  sodium chloride 0 45 % with KCl 20 mEq/L, 125 mL/hr, Intravenous, Continuous    PRN Meds:  acetaminophen, 650 mg, Oral, Q6H PRN  albuterol, 2 puff, Inhalation, Q6H PRN  ketorolac, 30 mg, Intravenous, Q6H PRN  ondansetron, 4 mg, Intravenous, Q6H PRN    Network Utilization Review Department  ATTENTION: Please call with any questions or concerns to [phone] and carefully listen to the prompts so that you are directed to the right person  All voicemails are confidential   Lonnie Iglesias all requests for admission clinical reviews, approved or denied determinations and any other requests to dedicated fax number below belonging to the campus where the patient is receiving treatment   List of dedicated fax numbers for the Facilities:  [address] DENIALS (Administrative/Medical Necessity) [phone]   [address] (Maternity/NICU/Pediatrics) [phone]   [address]  [address] [facility] [place] [address] [id number] [place] [address] [address] [facility] [facility] [phone]

## 2022-05-05 NOTE — ASSESSMENT & PLAN NOTE
Patient had tachycardic, (HR 94), with leukocytosis (16 K)  Source of infection is colitis identified on CT scan    · See plan under colitis

## 2022-05-05 NOTE — ASSESSMENT & PLAN NOTE
Presented with 3 day history of abdominal pain associated with nausea vomiting and bloody diarrhea  Found to have leukocytosis (16 K),  CT showing thickening of the left colon indicative of colitis  Given acute onset likely infectious/food-borne illness  He did receive dose of Flagyl in the ED and Toradol/Dilaudid for pain    · Continue Zosyn 3 375 mg  · Stool studies pending (C diff toxin PCR, Giardia antigen, O&P, stool PCR)  · Tylenol for mild pain and Toradol for moderate pain  · Bentyl  · IVFs: Normal saline 125 cc/hr, consider discontinuing as patient is tolerating diet  · Monitor CBC/vitals  · Monitor CMP/electrolyte levels  · Lipase wnl  · Consider GI consult if no improvement

## 2022-05-05 NOTE — ASSESSMENT & PLAN NOTE
Patient with a history of cardiac catheterization in 2010 and 2020  Stents placed in [facility] in 2020, 2 total  Follows with cardiology outpatient  Denies any chest pain and shortness of breath on admission  Patient states not taking his brilinta   Last saw cards in 09/21 and as that time was on the brilinta, unclear why he stopped the medicine      · Continue home medications  · Carvedilol 6 25 mg, PO, BID  · Aspirin 81 mg, PO, Daily   · F/u outpatient Cardiology to determine the need for continuation of Brilinta

## 2022-05-05 NOTE — ASSESSMENT & PLAN NOTE
Lab Results   Component Value Date    HGBA1C 7 5 (H) 03/30/2022       Recent Labs     05/06/22  1104 05/06/22  1557 05/06/22  2300 05/07/22  0717   POCGLU 154* 106 180* 120       Patient A1c noted above, well-controlled  Home meds metformin 1000 mg bid and recently started on victoza on 4/25, in titration process, on 1 2 mg currently, received injection this week    · Hold home victoza and metformin  · ISS   · Monitor serum glucose    · Monitor for basal and meal time insuline requirement IP

## 2022-05-05 NOTE — ASSESSMENT & PLAN NOTE
BP's on admission was elevated at 186 systolic but has since stabilized  · Continue home medications  ? Amlodipine 10 mg, PO, Daily   ? Carvedilol 6 25 mg, PO, BID  ?  Lisinopril 20 mg, PO, Daily   · Monitor BP

## 2022-05-05 NOTE — H&P
H&P Exam - Corey Jones 37 y o  male MRN: [id number]    Unit/Bed#: [address] [place] [address] Encounter: [id number]    Assessment:  62-year-old male with a PMH of morbid obesity, CAD s/p stent, HTN, DMT2 with hidradenitis suppurativa, and nocturnal hypoxia presents with generalized abdominal tenderness and cramping, nausea, vomiting, and bloody diarrhea  He was admitted for management of sepsis possible source of infection as colitis under the supervision of Dr Ross Morfin  Estimated length of stay less than 2 midnights  Plan:  * Sepsis [facility])  Assessment & Plan  Patient had tachycardic, (HR 94), with leukocytosis (16 K)  Source of infection is colitis identified on CT scan  · See plan under colitis      Colitis  Assessment & Plan  Presented with 3 day history of abdominal pain associated with nausea vomiting and bloody diarrhea  Found to have leukocytosis (16 K),  CT showing thickening of the left colon indicative of colitis  Given acute onset likely infectious/food-borne illness  He did receive dose of Flagyl in the ED and Toradol/Dilaudid for pain  · Zosyn 3 375 mg  · Stool study (C diff toxin PCR, Giardia antigen, O&P, stool PCR)  · Hold victoza and metformin  · Tylenol for mild and Toradol for moderate pain  · Half NS and K 20mEq 125 cc/hr  · Monitor CBC/vitals  · Monitor CMP and lipase in the AM  · Consider GI consult    Abscess of multiple sites of buttock  Assessment & Plan  CTAP on admission showing small rim-enhancing subcutaneous fluid collections are also noted at the level of the upper buttock crease, measuring 3 4 x 2 7 cm on the right and 2 6 x 1 8 cm on the left  Correlate for small abscesses  · Antibiotics as noted  · Consider I&D    Type 2 diabetes mellitus with diabetic polyneuropathy, without long-term current use of insulin (HCC)  Assessment & Plan  Lab Results   Component Value Date    HGBA1C 7 5 (H) 03/30/2022       No results for input(s): POCGLU in the last 72 hours      Patient A1c noted above, well-controlled  Home meds metformin 1000 mg bid and recently started on victoza on 4/25, in titration process, on 1 2 mg currently, received injection this week  · Hold home medications  · ISS   · Monitor serum glucose    · Monitor for basal and meal time insuline requirement IP      History of hidradenitis suppurativa  Assessment & Plan  Patient has history of hidranitis suppurativa with wounds currently present on left groin and abdominal pannus  · Local wound care  · Antibiotics as noted       Swelling of left lower extremity  Assessment & Plan  Patient has chronic left lower leg lymphedema s/p lymph node removal during the vascular surgery  Denies any calf tenderness on admission  Negative Trista sign  Nocturnal hypoxia  Assessment & Plan  Satting well on admission on room air  Was found to have nocturnal hypoxia on prior admission in April, 22  Had overnight O2 study during last admission which showed significant hypoxemia with lowest desaturation of 59%  At discharge he was advised to follow-up with pulmonology for outpatient sleep study but he has not done this yet  Was also found to require oxygen with exertion during last admission and was discharged with plan for 2 L with activity  · Oxygen as needed overnight  · Monitor respiratory status   · Encourage f/u outpatient for sleep study    Morbid obesity with body mass index (BMI) of 50 0 to 59 9 in adult [facility])  Assessment & Plan  · Diet/exercise counseling, consider outpatient bariatric consult    Essential hypertension  Assessment & Plan  BP's on admission was elevated at 636 systolic but has since stabilized  · Continue home medications  ? Amlodipine 10 mg, PO, Daily   ? Carvedilol 6 25 mg, PO, BID  ? Lisinopril 20 mg, PO, Daily   · Monitor BP       Dyslipidemia  Assessment & Plan  LiPID panel 11/29/20: T chol 195, , HDL 29,   · Continue home atorvastatin 80 mg q h s  And fish oil 1000 mg b i d         Coronary artery disease  Assessment & Plan  Patient with a history of cardiac catheterization in 2010 and 2020  Stents placed in [facility] in 2020, 2 total  Follows with cardiology outpatient  Denies any chest pain and shortness of breath on admission  Patient states not taking his brilinta  Last saw cards in 09/21 and as that time was on the brilinta, unclear why he stopped the medicine      · Continue home medications  · Carvedilol 6 25 mg, PO, BID  · Aspirin 81 mg, PO, Daily   · F/u outpatient Cardiology to determine the need for continuation of Brilinta      Global:  Half saline with KCl 20 mEq 125 cc/hour, diabetic diet, Lovenox 60 mg subQ b i d and SCD, replete electrolytes PRN    History of Present Illness     50-year-old male with a PMH of morbid obesity, CAD s/p stent, HTN, DMT2 with hidradenitis suppurativa, and nocturnal hypoxia presents with generalized abdominal tenderness and cramping, nausea, vomiting, and bloody diarrhea x3 days  Denies any sick contact or recent travel  He had some questionable eggs on Monday evening  Later Monday evening and into Tuesday, he started having bloody diarrhea nausea and vomiting  He reported symptoms are little better today and able to tolerate PO  He notices couple oz of blood in toilet on Tuesday with diarrhea  Patient also started on Victoza last Thursday in addition to metformin a 1000 mg b i d  Patient was recently discharged on 2 weeks of antibiotics for cellulitis at the end of March  He states that the cellulitis of the abdomen has been healing well after completion of the antibiotics course  ED:  Flagyl 500 mg IV, Zofran 4 mg x2, Pepcid 20 mg IV, Tylenol 975 mg, Toradol 15 mg, Dilaudid 1 mg, morphine 4 mg , 0 9% NS 1 L    Review of Systems   Constitutional: Negative for chills and fever  HENT: Negative for ear pain and sore throat  Eyes: Negative for pain and visual disturbance  Respiratory: Negative for cough and shortness of breath      Cardiovascular: Negative for chest pain and palpitations  Gastrointestinal: Positive for abdominal pain, blood in stool, diarrhea, nausea and vomiting  Genitourinary: Negative for dysuria and hematuria  Musculoskeletal: Negative for arthralgias and back pain  Skin: Negative for color change and rash  Neurological: Negative for seizures and syncope  All other systems reviewed and are negative  Historical Information   Past Medical History:   Diagnosis Date    Arthritis     Breathing difficulty     Coronary artery disease     Diabetes mellitus ([place] [address] )     Diabetic neuropathy ([place] [address] ) 5/28/2021    Heart disease     CAD   s/p ptca with 1 stent 2012    Hidradenitis suppurativa     groin    History of transfusion     Hyperlipidemia     Hypertension     MI (myocardial infarction) ([place] [address] )     " silent " M I  in the past    Morbid obesity with BMI of 50 0-59 9, adult ([place] [address] )     Nicotine dependence     Obesity     Pilonidal cyst      Past Surgical History:   Procedure Laterality Date    CARDIAC SURGERY      CORONARY ANGIOPLASTY WITH STENT PLACEMENT      INCISION AND DRAINAGE OF WOUND N/A 1/24/2017    Procedure: INCISION AND DRAINAGE (I&D) BUTTOCK, PILONIDAL CYST;  Surgeon: Jair Eckert MD;  Location: [address] [place] [address];  Service:    Reddy Moose LEG SURGERY Left     I&D of left leg 2012    CA DEBRIDEMENT, SKIN, SUB-Q TISSUE,=<20 SQ CM Left 7/6/2021    Procedure: DEBRIDEMENT WOUND [facility] OUT);   Surgeon: Fadia Birch MD;  [facility];  Service: General    CA EXC SKIN BENIG >4 CM TRUNK,ARM,LEG Left 4/6/2021    Procedure: EXCISION THIGH MASS;  Surgeon: Fadia Birch MD;  [facility];  Service: General    CA EXC SWEAT GLAND Carter Sickle Left 7/6/2021    Procedure: EXCISION PERINEAL ABSCESS LEFT THIGH;  Surgeon: Fadia Birch MD;  [facility];  Service: General    CA NEG PRESS WOUND TX, < 50 CM Left 4/6/2021    Procedure: APPLICATION VAC DRESSING THIGH;  Surgeon: Fadia Birch MD;  [facility];  Service: Harriet Dietz WOUND DEBRIDEMENT Left 2021    Procedure: DEBRIDEMENT WOUND AND DRESSING CHANGE [facility] OUT); Surgeon: Jethro Cabezas DO;  [facility];  Service: General    WOUND DEBRIDEMENT Left 2021    Procedure: DEBRIDEMENT LOWER EXTREMITY [facility] OUT), left groin and thigh;  Surgeon: Arthur Lemos MD;  [facility];  Service: General    WOUND DEBRIDEMENT Left 2021    Procedure: DEBRIDEMENT LOWER EXTREMITY ([address] OUT); Surgeon: Darrian Mendoza DO;  [facility];  Service: General    WOUND DEBRIDEMENT Left 2021    Procedure: Washout left thigh wound and closure;  Surgeon: Cherrie Verde DO;  [facility];  Service: General     Social History   Social History     Substance and Sexual Activity   Alcohol Use Not Currently    Comment: rarely     Social History     Substance and Sexual Activity   Drug Use No     Social History     Tobacco Use   Smoking Status Former Smoker    Packs/day: 1 50    Years: 20 00    Pack years: 30 00    Types: Cigarettes    Start date:    Gladys Frankel Quit date: 2021    Years since quittin 1   Smokeless Tobacco Never Used     E-Cigarette Use: Never User     E-Cigarette/Vaping Substances    Nicotine No     THC No     CBD No     Flavoring No        Family History:   Family History   Problem Relation Age of Onset    Heart disease Father     Diabetes Father     Hypertension Mother     Heart disease Mother        Meds/Allergies   PTA meds:   Prior to Admission Medications   Prescriptions Last Dose Informant Patient Reported? Taking?    Insulin Pen Needle (PEN NEEDLES 29GX1/2") 29G X 12MM MISC Not Taking at Unknown time  No No   Sig: Use 1 needle daily for subcutaneous injections   Patient not taking: Reported on 2022    Omega-3 Fatty Acids (fish oil) 1,000 mg 2022  No No   Sig: Take 1 capsule (1,000 mg total) by mouth 2 (two) times a day   acetaminophen (TYLENOL) 325 mg tablet Not Taking at Unknown time  No No   Sig: Take 2 tablets (650 mg total) by mouth every 6 (six) hours as needed for mild pain, moderate pain or headaches   Patient not taking: Reported on 4/20/2022    albuterol (ProAir HFA) 90 mcg/act inhaler More than a month at Unknown time  No No   Sig: Inhale 2 puffs every 6 (six) hours as needed for wheezing   amLODIPine (NORVASC) 10 mg tablet 5/2/2022  No No   Sig: Take 1 tablet (10 mg total) by mouth daily   aspirin (ECOTRIN LOW STRENGTH) 81 mg EC tablet 5/2/2022  No No   Sig: Take 1 tablet (81 mg total) by mouth daily   atorvastatin (LIPITOR) 80 mg tablet 5/2/2022  No No   Sig: Take 1 tablet (80 mg total) by mouth daily   carvedilol (COREG) 6 25 mg tablet 5/2/2022  No No   Sig: Take 1 tablet (6 25 mg total) by mouth 2 (two) times a day with meals   liraglutide (VICTOZA) injection 5/2/2022  No No   Sig: Inject 0 1 mL (0 6 mg total) under the skin daily Start at 0 6 mg daily for a week, then increase to 1 2 mg daily on the second week, then increase to 1 8 mg daily on the third week and going forward     lisinopril (ZESTRIL) 20 mg tablet 5/2/2022  No No   Sig: Take 1 tablet (20 mg total) by mouth daily   metFORMIN (GLUCOPHAGE) 1000 MG tablet 5/2/2022  No No   Sig: Take 1 tablet (1,000 mg total) by mouth 2 (two) times a day with meals   ticagrelor (Brilinta) 90 MG Not Taking at Unknown time  No No   Sig: Take 1 tablet (90 mg total) by mouth every 12 (twelve) hours   Patient not taking: Reported on 5/2/2022    zinc sulfate (ZINCATE) 220 mg capsule 5/2/2022  No No   Sig: Take 1 capsule (220 mg total) by mouth daily      Facility-Administered Medications Last Administration Doses Remaining   lidocaine (XYLOCAINE) 4 % topical solution 5 mL None recorded 1        Allergies   Allergen Reactions    Amoxicillin Hives     childhood       Objective   First Vitals:   Blood Pressure: (!) 182/90 (05/04/22 1610)  Pulse: 94 (05/04/22 1610)  Temperature: 99 6 °F (37 6 °C) (05/04/22 1610)  Temp Source: Tympanic (05/04/22 1610)  Respirations: 20 (05/04/22 1610)  Weight - Scale: (!) 165 kg (363 lb) (05/04/22 1610)  SpO2: 92 % (05/04/22 1610)    Current Vitals:   Blood Pressure: (!) 145/101 (05/04/22 2028)  Pulse: 96 (05/04/22 2028)  Temperature: 98 9 °F (37 2 °C) (05/04/22 2028)  Temp Source: Tympanic (05/04/22 1610)  Respirations: 19 (05/04/22 2028)  Weight - Scale: (!) 165 kg (363 lb) (05/04/22 1610)  SpO2: 90 % (05/04/22 2028)      Intake/Output Summary (Last 24 hours) at 5/4/2022 2149  Last data filed at [facility] per 24 hour   Intake 1000 ml   Output --   Net 1000 ml       Invasive Devices  Report    Peripheral Intravenous Line            Peripheral IV 05/04/22 Right Antecubital <1 day                Physical Exam  Vitals and nursing note reviewed  Constitutional:       Appearance: He is well-developed  HENT:      Head: Normocephalic and atraumatic  Mouth/Throat:      Mouth: Mucous membranes are moist    Eyes:      Conjunctiva/sclera: Conjunctivae normal    Cardiovascular:      Rate and Rhythm: Normal rate and regular rhythm  Pulses: Normal pulses  Heart sounds: Normal heart sounds  No murmur heard  Pulmonary:      Effort: Pulmonary effort is normal  No respiratory distress  Breath sounds: Normal breath sounds  Abdominal:      General: Bowel sounds are normal  There is no distension  Palpations: Abdomen is soft  There is no mass  Tenderness: There is abdominal tenderness (generalized)  There is no guarding or rebound  Comments: Nodule from scaring under the left abdominal pannus  Slight erythema of the lower abdomen   Musculoskeletal:      Cervical back: Neck supple  Right lower leg: No edema  Left lower leg: Edema (from chronic lymphedema ) present  Comments: Varicose veins bilaterally   Skin:     General: Skin is warm and dry  Comments: Soft nontender mass about 1 cm in diameter on the left upper buttock    2 Abscess to the left and right of the midline buttock Neurological:      Mental Status: He is alert     Psychiatric:         Mood and Affect: Mood normal          Behavior: Behavior normal        Lab Results:   Results for orders placed or performed during the hospital encounter of 05/04/22   COVID/FLU/RSV - 2 hour TAT    Specimen: Nose; Nares   Result Value Ref Range    SARS-CoV-2 Negative Negative    INFLUENZA A PCR Negative Negative    INFLUENZA B PCR Negative Negative    RSV PCR Negative Negative   CBC and differential   Result Value Ref Range    WBC 16 51 (H) 4 31 - 10 16 Thousand/uL    RBC 5 45 3 88 - 5 62 Million/uL    Hemoglobin 13 2 12 0 - 17 0 g/dL    Hematocrit 43 4 36 5 - 49 3 %    MCV 80 (L) 82 - 98 fL    MCH 24 2 (L) 26 8 - 34 3 pg    MCHC 30 4 (L) 31 4 - 37 4 g/dL    RDW 19 6 (H) 11 6 - 15 1 %    MPV 9 1 8 9 - 12 7 fL    Platelets 188 884 - 789 Thousands/uL    nRBC 0 /100 WBCs    Neutrophils Relative 85 (H) 43 - 75 %    Immat GRANS % 0 0 - 2 %    Lymphocytes Relative 8 (L) 14 - 44 %    Monocytes Relative 6 4 - 12 %    Eosinophils Relative 1 0 - 6 %    Basophils Relative 0 0 - 1 %    Neutrophils Absolute 13 93 (H) 1 85 - 7 62 Thousands/µL    Immature Grans Absolute 0 07 0 00 - 0 20 Thousand/uL    Lymphocytes Absolute 1 36 0 60 - 4 47 Thousands/µL    Monocytes Absolute 1 00 0 17 - 1 22 Thousand/µL    Eosinophils Absolute 0 11 0 00 - 0 61 Thousand/µL    Basophils Absolute 0 04 0 00 - 0 10 Thousands/µL   Comprehensive metabolic panel   Result Value Ref Range    Sodium 136 136 - 145 mmol/L    Potassium 4 0 3 5 - 5 3 mmol/L    Chloride 97 (L) 100 - 108 mmol/L    CO2 32 21 - 32 mmol/L    ANION GAP 7 4 - 13 mmol/L    BUN 16 5 - 25 mg/dL    Creatinine 1 09 0 60 - 1 30 mg/dL    Glucose 137 65 - 140 mg/dL    Calcium 8 6 8 3 - 10 1 mg/dL    Corrected Calcium 9 6 8 3 - 10 1 mg/dL    AST 15 5 - 45 U/L    ALT 16 12 - 78 U/L    Alkaline Phosphatase 99 46 - 116 U/L    Total Protein 8 2 6 4 - 8 2 g/dL    Albumin 2 7 (L) 3 5 - 5 0 g/dL    Total Bilirubin 0 35 0 20 - 1 00 mg/dL    eGFR 84 ml/min/1 73sq m   Lipase   Result Value Ref Range    Lipase 81 73 - 393 u/L   Lactic acid   Result Value Ref Range    LACTIC ACID 0 7 0 5 - 2 0 mmol/L       Imaging:   CT abdomen pelvis with contrast   Final Result   1  Diffuse wall thickening of the left colon most suggestive of a colitis, favoring an inflammatory/infectious etiology  Correlate clinically to exclude an ischemic etiology  2  Persistent cutaneous thickening and subcutaneous infiltration in the anterior lower pelvic wall   Correlate clinically for cellulitis  3  Small rim-enhancing subcutaneous fluid collections are also noted at the level of the upper buttock crease, measuring 3 4 x 2 7 cm on the right and 2 6 x 1 8 cm on the left  Correlate for small abscesses  Workstation performed: [id number]             EKG, Pathology, and Other Studies:  Reviewed pertinent information    Code Status: Level 1 - Full Code  Advance Directive and Living Will:      Power of :    POLST:      Counseling / Coordination of Care: Total floor / unit time spent today 30 minutes

## 2022-05-05 NOTE — CONSULTS
Consultation - General Surgery   Dony Baeza 37 y o  male MRN: [id number]  Unit/Bed#: [address] [place] [address] Encounter: [id number]    Assessment/Plan     Assessment:  36 yo M with chronic pilonidal cyst  Patients denies pain in that area  No sings of acute infection  Since this is chronic issue, I&D is not indicated    Plan:  Colitis management per primary  Continue local wound care  Definitive management after his modifiable risk factors are adressed    History of Present Illness     HPI:  Dony Baeza is a 37 y o  male with history of DM, morbid obesity, pilonidal cyst, multiple debridements and wash out of left lower extremity, cellulitis of abdominal wall who presents with abdominal pain and bloody diarrhea on 5/4/2022  Workup revealed he had colitis therefore he was admitted and IV abx therapy was started  His symptoms have largely resolved however general surgery was consulted since CT scan showed small abscesses in subcutaneous tissue at the level of upper fernandes crease  He has known history of pilonidal cyst  He has drainage for years but denies pain at that region  The collection was present in CT scan on 3/30/2022, as well as one in 2020  Consults    Review of Systems   Constitutional: Negative  HENT: Negative  Eyes: Negative  Respiratory: Negative  Cardiovascular: Negative  Gastrointestinal: Positive for abdominal pain and blood in stool  Endocrine: Negative  Genitourinary: Negative  Musculoskeletal: Negative  Skin: Positive for wound  Allergic/Immunologic: Negative  Neurological: Negative  Hematological: Negative  Psychiatric/Behavioral: Negative  All other systems reviewed and are negative        Historical Information   Past Medical History:   Diagnosis Date    Arthritis     Breathing difficulty     Coronary artery disease     Diabetes mellitus [facility]     [facility] 5/28/2021    Heart disease     CAD   s/p ptca with 1 stent 2012    Hidradenitis suppurativa     groin    History of transfusion     Hyperlipidemia     Hypertension     MI (myocardial infarction) [facility]     " silent " M I  in the past    Morbid obesity with BMI of 50 0-59 9, adult ([facility] )     Nicotine dependence     Obesity     Pilonidal cyst      Past Surgical History:   Procedure Laterality Date    CARDIAC SURGERY      CORONARY ANGIOPLASTY WITH STENT PLACEMENT      INCISION AND DRAINAGE OF WOUND N/A 1/24/2017    Procedure: INCISION AND DRAINAGE (I&D) BUTTOCK, PILONIDAL CYST;  Surgeon: Julio Gallegos MD;  Location: [address] [place] [address];  Service:    [facility] LEG SURGERY Left     I&D of left leg 2012    AZ DEBRIDEMENT, SKIN, SUB-Q TISSUE,=<20 SQ CM Left 7/6/2021    Procedure: DEBRIDEMENT WOUND [facility] OUT); Surgeon: Bryan Page MD;  [facility];  Service: General    AZ EXC SKIN BENIG >4 CM TRUNK,ARM,LEG Left 4/6/2021    Procedure: EXCISION THIGH MASS;  Surgeon: Bryan Page MD;  [facility];  Service: General    AZ EXC SWEAT GLAND Geovanna Speak Left 7/6/2021    Procedure: EXCISION PERINEAL ABSCESS LEFT THIGH;  Surgeon: Bryan Page MD;  [facility];  Service: General    AZ NEG PRESS WOUND TX, < 50 CM Left 4/6/2021    Procedure: APPLICATION VAC DRESSING THIGH;  Surgeon: Bryan Page MD;  [facility];  Service: General    WOUND DEBRIDEMENT Left 4/17/2021    Procedure: DEBRIDEMENT WOUND AND DRESSING CHANGE ([address] OUT); Surgeon: Judson Dee DO;  [facility];  Service: General    WOUND DEBRIDEMENT Left 4/22/2021    Procedure: DEBRIDEMENT LOWER EXTREMITY [facility] OUT), left groin and thigh;  Surgeon: Bryan Page MD;  [facility];  Service: General    WOUND DEBRIDEMENT Left 5/26/2021    Procedure: DEBRIDEMENT LOWER EXTREMITY ([address] OUT); Surgeon:  Claire Dotson DO;  [facility];  Service: General    WOUND DEBRIDEMENT Left 5/28/2021    Procedure: Washout left thigh wound and closure;  Surgeon: Susanne Villar DO;  [facility];  Service: General     Social History   Social History     Substance and Sexual Activity   Alcohol Use Not Currently    Comment: rarely     Social History     Substance and Sexual Activity   Drug Use No     E-Cigarette/Vaping    E-Cigarette Use Never User      E-Cigarette/Vaping Substances    Nicotine No     THC No     CBD No     Flavoring No      Social History     Tobacco Use   Smoking Status Former Smoker    Packs/day: 1 50    Years: 20 00    Pack years: 30 00    Types: Cigarettes    Start date:    Keenes Darci Quit date: 2021    Years since quittin 1   Smokeless Tobacco Never Used     Family History: non-contributory    Meds/Allergies   all current active meds have been reviewed  Allergies   Allergen Reactions    Amoxicillin Hives     childhood       Objective   First Vitals:   Blood Pressure: (!) 182/90 (22 1610)  Pulse: 94 (22 161)  Temperature: 99 6 °F (37 6 °C) (22 161)  Temp Source: Tympanic (22 161)  Respirations: 20 (22 161)  Height: 5' 10" (177 8 cm) (22 0022)  Weight - Scale: (!) 165 kg (363 lb) (22 161)  SpO2: 92 % (22 161)    Current Vitals:   Blood Pressure: 106/65 (22 0838)  Pulse: 68 (22 0838)  Temperature: 97 6 °F (36 4 °C) (22 0758)  Temp Source: Oral (22 0312)  Respirations: 18 (22 0312)  Height: 5' 10" (177 8 cm) (22 0022)  Weight - Scale: (!) 165 kg (363 lb) (22 1610)  SpO2: 90 % (22 0838)      Intake/Output Summary (Last 24 hours) at 2022 0941  Last data filed at [facility]  Gross per 24 hour   Intake 1000 ml   Output --   Net 1000 ml       Invasive Devices  Report    Peripheral Intravenous Line            Peripheral IV 22 Right Antecubital <1 day                Physical Exam  Constitutional:       Appearance: He is obese  HENT:      Head: Normocephalic and atraumatic        Right Ear: External ear normal       Left Ear: External ear normal       Nose: Nose normal       Mouth/Throat:      Mouth: Mucous membranes are moist       Pharynx: Oropharynx is clear  Eyes:      Extraocular Movements: Extraocular movements intact  Conjunctiva/sclera: Conjunctivae normal       Pupils: Pupils are equal, round, and reactive to light  Cardiovascular:      Rate and Rhythm: Normal rate and regular rhythm  Pulses: Normal pulses  Heart sounds: Normal heart sounds  Pulmonary:      Effort: Pulmonary effort is normal       Breath sounds: Normal breath sounds  Abdominal:      General: Abdomen is flat  Bowel sounds are normal       Palpations: Abdomen is soft  Musculoskeletal:         General: Normal range of motion  Cervical back: Normal range of motion and neck supple  Skin:     General: Skin is warm  Capillary Refill: Capillary refill takes less than 2 seconds  Findings: Lesion present  Comments: Chronically thickened skin at sacral area, +drainage, no erythema or tenderness   Neurological:      General: No focal deficit present  Mental Status: He is alert and oriented to person, place, and time  Mental status is at baseline  Psychiatric:         Mood and Affect: Mood normal          Behavior: Behavior normal          Thought Content: Thought content normal          Judgment: Judgment normal          Lab Results: I have personally reviewed pertinent lab results  Imaging: I have personally reviewed pertinent reports  EKG, Pathology, and Other Studies: I have personally reviewed pertinent reports  Counseling / Coordination of Care  Total floor / unit time spent today 30 minutes  Greater than 50% of total time was spent with the patient and / or family counseling and / or coordination of care  A description of the counseling / coordination of care: Daysi Schroeder

## 2022-05-05 NOTE — ED PROVIDER NOTES
History  Chief Complaint   Patient presents with    Abdominal Pain     Started on Monday, mid abd pain  N/V/D  No ill exposure  41yoM hx diabetes c/o diarrhea and lower abd pain with n/v for the past 2 days  Sent in by PCP for evaluation  Prior to Admission Medications   Prescriptions Last Dose Informant Patient Reported? Taking? Insulin Pen Needle (PEN NEEDLES 29GX1/2") 29G X 12MM MISC Not Taking at Unknown time  No No   Sig: Use 1 needle daily for subcutaneous injections   Patient not taking: Reported on 5/4/2022    Omega-3 Fatty Acids (fish oil) 1,000 mg 5/2/2022  No No   Sig: Take 1 capsule (1,000 mg total) by mouth 2 (two) times a day   acetaminophen (TYLENOL) 325 mg tablet Not Taking at Unknown time  No No   Sig: Take 2 tablets (650 mg total) by mouth every 6 (six) hours as needed for mild pain, moderate pain or headaches   Patient not taking: Reported on 4/20/2022    albuterol (ProAir HFA) 90 mcg/act inhaler More than a month at Unknown time  No No   Sig: Inhale 2 puffs every 6 (six) hours as needed for wheezing   amLODIPine (NORVASC) 10 mg tablet 5/2/2022  No No   Sig: Take 1 tablet (10 mg total) by mouth daily   aspirin (ECOTRIN LOW STRENGTH) 81 mg EC tablet 5/2/2022  No No   Sig: Take 1 tablet (81 mg total) by mouth daily   atorvastatin (LIPITOR) 80 mg tablet 5/2/2022  No No   Sig: Take 1 tablet (80 mg total) by mouth daily   carvedilol (COREG) 6 25 mg tablet 5/2/2022  No No   Sig: Take 1 tablet (6 25 mg total) by mouth 2 (two) times a day with meals   liraglutide (VICTOZA) injection 5/2/2022  No No   Sig: Inject 0 1 mL (0 6 mg total) under the skin daily Start at 0 6 mg daily for a week, then increase to 1 2 mg daily on the second week, then increase to 1 8 mg daily on the third week and going forward     lisinopril (ZESTRIL) 20 mg tablet 5/2/2022  No No   Sig: Take 1 tablet (20 mg total) by mouth daily   metFORMIN (GLUCOPHAGE) 1000 MG tablet 5/2/2022  No No   Sig: Take 1 tablet (1,000 mg total) by mouth 2 (two) times a day with meals   ticagrelor (Brilinta) 90 MG Not Taking at Unknown time  No No   Sig: Take 1 tablet (90 mg total) by mouth every 12 (twelve) hours   Patient not taking: Reported on 5/2/2022    zinc sulfate (ZINCATE) 220 mg capsule 5/2/2022  No No   Sig: Take 1 capsule (220 mg total) by mouth daily      Facility-Administered Medications Last Administration Doses Remaining   lidocaine (XYLOCAINE) 4 % topical solution 5 mL None recorded 1          Past Medical History:   Diagnosis Date    Arthritis     Breathing difficulty     Coronary artery disease     Diabetes mellitus ([facility] )     Diabetic neuropathy ([facility] ) 5/28/2021    Heart disease     CAD   s/p ptca with 1 stent 2012    Hidradenitis suppurativa     groin    History of transfusion     Hyperlipidemia     Hypertension     MI (myocardial infarction) ([place] [address] )     " silent " M I  in the past    Morbid obesity with BMI of 50 0-59 9, adult ([place] [address] )     Nicotine dependence     Obesity     Pilonidal cyst        Past Surgical History:   Procedure Laterality Date    CARDIAC SURGERY      CORONARY ANGIOPLASTY WITH STENT PLACEMENT      INCISION AND DRAINAGE OF WOUND N/A 1/24/2017    Procedure: INCISION AND DRAINAGE (I&D) BUTTOCK, PILONIDAL CYST;  Surgeon: Gris Lyons MD;  [facility];  Service:    Eduard Rand LEG SURGERY Left     I&D of left leg 2012    AZ DEBRIDEMENT, SKIN, SUB-Q TISSUE,=<20 SQ CM Left 7/6/2021    Procedure: DEBRIDEMENT WOUND [facility] OUT);   Surgeon: Akshat Spencer MD;  [facility];  Service: General    AZ EXC SKIN BENIG >4 CM TRUNK,ARM,LEG Left 4/6/2021    Procedure: EXCISION THIGH MASS;  Surgeon: Akshat Spencer MD;  [facility];  Service: General    AZ EXC SWEAT GLAND Francisco Jelm Left 7/6/2021    Procedure: EXCISION PERINEAL ABSCESS LEFT THIGH;  Surgeon: Akshat Spencer MD;  [facility];  Service: General    AZ NEG PRESS WOUND TX, < 50 CM Left 4/6/2021    Procedure: APPLICATION VAC DRESSING THIGH;  Surgeon: Sterling Lyon MD;  [facility];  Service: General    WOUND DEBRIDEMENT Left 2021    Procedure: DEBRIDEMENT WOUND AND DRESSING CHANGE [facility] OUT); Surgeon: Miriam Perry DO;  [facility];  Service: General    WOUND DEBRIDEMENT Left 2021    Procedure: DEBRIDEMENT LOWER EXTREMITY [facility] OUT), left groin and thigh;  Surgeon: Sterling Lyon MD;  [facility];  Service: General    WOUND DEBRIDEMENT Left 2021    Procedure: DEBRIDEMENT LOWER EXTREMITY ([address] OUT); Surgeon: Qian Hopkins DO;  [facility];  Service: General    WOUND DEBRIDEMENT Left 2021    Procedure: Washout left thigh wound and closure;  Surgeon: Matilde Fernandez DO;  [facility];  Service: General       Family History   Problem Relation Age of Onset    Heart disease Father     Diabetes Father     Hypertension Mother     Heart disease Mother      I have reviewed and agree with the history as documented  E-Cigarette/Vaping    E-Cigarette Use Never User      E-Cigarette/Vaping Substances    Nicotine No     THC No     CBD No     Flavoring No      Social History     Tobacco Use    Smoking status: Former Smoker     Packs/day: 1 50     Years: 20 00     Pack years: 30 00     Types: Cigarettes     Start date:      Quit date: 2021     Years since quittin 1    Smokeless tobacco: Never Used   Vaping Use    Vaping Use: Never used   Substance Use Topics    Alcohol use: Not Currently     Comment: rarely    Drug use: No       Review of Systems   Constitutional: Negative for fever  Respiratory: Negative for cough  Gastrointestinal: Positive for abdominal pain, diarrhea and vomiting  Musculoskeletal: Negative for back pain  Neurological: Negative for headaches  All other systems reviewed and are negative  Physical Exam  Physical Exam  Vitals reviewed  Constitutional:       Appearance: He is well-developed     HENT:      Head: Normocephalic and atraumatic  Right Ear: External ear normal       Left Ear: External ear normal       Nose: Nose normal  No rhinorrhea  Mouth/Throat:      Mouth: Mucous membranes are moist    Eyes:      Conjunctiva/sclera: Conjunctivae normal    Cardiovascular:      Rate and Rhythm: Normal rate and regular rhythm  Pulmonary:      Effort: Pulmonary effort is normal       Breath sounds: Normal breath sounds  No wheezing or rales  Abdominal:      Palpations: Abdomen is soft  Tenderness: There is abdominal tenderness (diffuse mild)  Genitourinary:     Testes: Normal       Comments: Chronic appearing pilonidal cyst   Healing chronic wound to L prox medial thigh  Musculoskeletal:      Cervical back: Neck supple  Right lower leg: No edema  Left lower leg: No edema  Skin:     General: Skin is warm and dry  Neurological:      Mental Status: He is alert and oriented to person, place, and time     Psychiatric:         Mood and Affect: Mood normal          Vital Signs  ED Triage Vitals [05/04/22 1610]   Temperature Pulse Respirations Blood Pressure SpO2   99 6 °F (37 6 °C) 94 20 (!) 182/90 92 %      Temp Source Heart Rate Source Patient Position - Orthostatic VS BP Location FiO2 (%)   Tympanic Monitor Sitting Right arm --      Pain Score       10 - Worst Possible Pain           Vitals:    05/04/22 1845 05/04/22 1900 05/04/22 2000 05/04/22 2028   BP: 160/85 151/74 145/76 (!) 145/101   Pulse: 76 68 76 96   Patient Position - Orthostatic VS: Lying Lying Lying          Visual Acuity      ED Medications  Medications   amLODIPine (NORVASC) tablet 10 mg (has no administration in time range)   aspirin (ECOTRIN LOW STRENGTH) EC tablet 81 mg (has no administration in time range)   atorvastatin (LIPITOR) tablet 80 mg (has no administration in time range)   carvedilol (COREG) tablet 6 25 mg (has no administration in time range)   lisinopril (ZESTRIL) tablet 20 mg (has no administration in time range)   fish oil capsule 1,000 mg (1,000 mg Oral Given 5/4/22 2128)   zinc sulfate (ZINCATE) capsule 220 mg (has no administration in time range)   albuterol (PROVENTIL HFA,VENTOLIN HFA) inhaler 2 puff (has no administration in time range)   acetaminophen (TYLENOL) tablet 650 mg (has no administration in time range)   ondansetron (ZOFRAN) injection 4 mg (has no administration in time range)   enoxaparin (LOVENOX) subcutaneous injection 60 mg (60 mg Subcutaneous Given 5/4/22 2128)   ketorolac (TORADOL) injection 15 mg (has no administration in time range)   piperacillin-tazobactam (ZOSYN) 3 375 g in sodium chloride 0 9 % 100 mL IVPB (has no administration in time range)   sodium chloride 0 45 % with KCl 20 mEq/L infusion (premix) (has no administration in time range)   sodium chloride 0 9 % bolus 1,000 mL (0 mL Intravenous Stopped 5/4/22 1929)   ondansetron (ZOFRAN) injection 4 mg (4 mg Intravenous Given 5/4/22 1710)   ketorolac (TORADOL) injection 15 mg (15 mg Intravenous Given 5/4/22 1714)   acetaminophen (TYLENOL) tablet 975 mg (975 mg Oral Given 5/4/22 1721)   Famotidine (PF) (PEPCID) injection 20 mg (20 mg Intravenous Given 5/4/22 1717)   iohexol (OMNIPAQUE) 350 MG/ML injection (SINGLE-DOSE) 100 mL (100 mL Intravenous Given 5/4/22 1823)   morphine (PF) 4 mg/mL injection 4 mg (4 mg Intravenous Given 5/4/22 1842)   ondansetron (ZOFRAN) injection 4 mg (4 mg Intravenous Given 5/4/22 1839)   HYDROmorphone (DILAUDID) injection 1 mg (1 mg Intravenous Given 5/4/22 1947)       Diagnostic Studies  Results Reviewed     Procedure Component Value Units Date/Time    Lactic acid [[id number]]  (Normal) Collected: 05/04/22 1707    Lab Status: Final result Specimen: Blood from Arm, Right Updated: 05/04/22 1808     LACTIC ACID 0 7 mmol/L     Narrative:      Result may be elevated if tourniquet was used during collection      Comprehensive metabolic panel [[id number]]  (Abnormal) Collected: 05/04/22 5913    Lab Status: Final result Specimen: Blood from Arm, Right Updated: 05/04/22 1741     Sodium 136 mmol/L      Potassium 4 0 mmol/L      Chloride 97 mmol/L      CO2 32 mmol/L      ANION GAP 7 mmol/L      BUN 16 mg/dL      Creatinine 1 09 mg/dL      Glucose 137 mg/dL      Calcium 8 6 mg/dL      Corrected Calcium 9 6 mg/dL      AST 15 U/L      ALT 16 U/L      Alkaline Phosphatase 99 U/L      Total Protein 8 2 g/dL      Albumin 2 7 g/dL      Total Bilirubin 0 35 mg/dL      eGFR 84 ml/min/1 73sq m     Narrative:      [facility] guidelines for Chronic Kidney Disease (CKD):     Stage 1 with normal or high GFR (GFR > 90 mL/min/1 73 square meters)    Stage 2 Mild CKD (GFR = 60-89 mL/min/1 73 square meters)    Stage 3A Moderate CKD (GFR = 45-59 mL/min/1 73 square meters)    Stage 3B Moderate CKD (GFR = 30-44 mL/min/1 73 square meters)    Stage 4 Severe CKD (GFR = 15-29 mL/min/1 73 square meters)    Stage 5 End Stage CKD (GFR <15 mL/min/1 73 square meters)  Note: GFR calculation is accurate only with a steady state creatinine    Lipase [[id number]]  (Normal) Collected: 05/04/22 1707    Lab Status: Final result Specimen: Blood from Arm, Right Updated: 05/04/22 1741     Lipase 81 u/L     COVID/FLU/RSV - 2 hour TAT [[id number]]  (Normal) Collected: 05/04/22 1658    Lab Status: Final result Specimen: Nares from Nose Updated: 05/04/22 1740     SARS-CoV-2 Negative     INFLUENZA A PCR Negative     INFLUENZA B PCR Negative     RSV PCR Negative    Narrative:      FOR PEDIATRIC PATIENTS - copy/paste COVID Guidelines URL to browser: [email or web address]    SARS-CoV-2 assay is a Nucleic Acid Amplification assay intended for the  qualitative detection of nucleic acid from SARS-CoV-2 in nasopharyngeal  swabs  Results are for the presumptive identification of SARS-CoV-2 RNA      Positive results are indicative of infection with SARS-CoV-2, the virus  causing COVID-19, but do not rule out bacterial infection or co-infection  with other viruses  Laboratories within the [place] and its  territories are required to report all positive results to the appropriate  public health authorities  Negative results do not preclude SARS-CoV-2  infection and should not be used as the sole basis for treatment or other  patient management decisions  Negative results must be combined with  clinical observations, patient history, and epidemiological information  This test has not been FDA cleared or approved  This test has been authorized by FDA under an Emergency Use Authorization  (EUA)  This test is only authorized for the duration of time the  declaration that circumstances exist justifying the authorization of the  emergency use of an in vitro diagnostic tests for detection of SARS-CoV-2  virus and/or diagnosis of COVID-19 infection under section 564(b)(1) of  the Act, 21 U  S C  365QEI-3(Y)(4), unless the authorization is terminated  or revoked sooner  The test has been validated but independent review by FDA  and CLIA is pending  Test performed using [employer] GeneXpert: This RT-PCR assay targets N2,  a region unique to SARS-CoV-2  A conserved region in the E-gene was chosen  for pan-Sarbecovirus detection which includes SARS-CoV-2      CBC and differential [[id number]]  (Abnormal) Collected: 05/04/22 1707    Lab Status: Final result Specimen: Blood from Arm, Right Updated: 05/04/22 1717     WBC 16 51 Thousand/uL      RBC 5 45 Million/uL      Hemoglobin 13 2 g/dL      Hematocrit 43 4 %      MCV 80 fL      MCH 24 2 pg      MCHC 30 4 g/dL      RDW 19 6 %      MPV 9 1 fL      Platelets 453 Thousands/uL      nRBC 0 /100 WBCs      Neutrophils Relative 85 %      Immat GRANS % 0 %      Lymphocytes Relative 8 %      Monocytes Relative 6 %      Eosinophils Relative 1 %      Basophils Relative 0 %      Neutrophils Absolute 13 93 Thousands/µL      Immature Grans Absolute 0 07 Thousand/uL      Lymphocytes Absolute 1 36 Thousands/µL Monocytes Absolute 1 00 Thousand/µL      Eosinophils Absolute 0 11 Thousand/µL      Basophils Absolute 0 04 Thousands/µL                  CT abdomen pelvis with contrast   Final Result by Galileo Lilly MD (05/04 1853)   1  Diffuse wall thickening of the left colon most suggestive of a colitis, favoring an inflammatory/infectious etiology  Correlate clinically to exclude an ischemic etiology  2  Persistent cutaneous thickening and subcutaneous infiltration in the anterior lower pelvic wall   Correlate clinically for cellulitis  3  Small rim-enhancing subcutaneous fluid collections are also noted at the level of the upper buttock crease, measuring 3 4 x 2 7 cm on the right and 2 6 x 1 8 cm on the left  Correlate for small abscesses  Workstation performed: [id number]                    Procedures  Procedures         ED Course                               SBIRT 22yo+      Most Recent Value   SBIRT (23 yo +)    In order to provide better care to our patients, we are screening all of our patients for alcohol and drug use  Would it be okay to ask you these screening questions? No Filed at: 05/04/2022 1705                    MDM  Number of Diagnoses or Management Options  Colitis  Diagnosis management comments: CT shows colitis  Intractable pain despite IV opiates  Wbc 16k  Thigh and gluteal lesions are chronic appearing, no evidence of Santhosh gangrene  Started cipro/flagyl and admitted to [facility]          Disposition  Final diagnoses:   Colitis     Time reflects when diagnosis was documented in both MDM as applicable and the Disposition within this note     Time User Action Codes Description Comment    5/4/2022  7:34 PM Darylene Estimable Add [K52 9] Colitis       ED Disposition     ED Disposition Condition Date/Time Comment    Admit Stable Wed May 4, 2022 1934 Case was discussed with Dr Bridget Salazar and the patient's admission status was agreed to be Admission Status: observation status to the service of Dr [facility]    None         Current Discharge Medication List      CONTINUE these medications which have NOT CHANGED    Details   acetaminophen (TYLENOL) 325 mg tablet Take 2 tablets (650 mg total) by mouth every 6 (six) hours as needed for mild pain, moderate pain or headaches  Qty: 30 tablet, Refills: 0    Associated Diagnoses: Cellulitis of abdominal wall      albuterol (ProAir HFA) 90 mcg/act inhaler Inhale 2 puffs every 6 (six) hours as needed for wheezing  Qty: 8 5 g, Refills: 3    Comments: Substitution to a formulary equivalent within the same pharmaceutical class is authorized  Associated Diagnoses: Shortness of breath      amLODIPine (NORVASC) 10 mg tablet Take 1 tablet (10 mg total) by mouth daily  Qty: 90 tablet, Refills: 1    Associated Diagnoses: Essential hypertension      aspirin (ECOTRIN LOW STRENGTH) 81 mg EC tablet Take 1 tablet (81 mg total) by mouth daily  Qty: 30 tablet, Refills: 0    Associated Diagnoses: CAD (coronary artery disease)      atorvastatin (LIPITOR) 80 mg tablet Take 1 tablet (80 mg total) by mouth daily  Qty: 90 tablet, Refills: 1    Associated Diagnoses: Dyslipidemia      carvedilol (COREG) 6 25 mg tablet Take 1 tablet (6 25 mg total) by mouth 2 (two) times a day with meals  Qty: 180 tablet, Refills: 1    Associated Diagnoses: Essential hypertension      Insulin Pen Needle (PEN NEEDLES 29GX1/2") 29G X 12MM MISC Use 1 needle daily for subcutaneous injections  Qty: 50 each, Refills: 3    Associated Diagnoses: DM type 2 with diabetic peripheral neuropathy (HCC)      liraglutide (VICTOZA) injection Inject 0 1 mL (0 6 mg total) under the skin daily Start at 0 6 mg daily for a week, then increase to 1 2 mg daily on the second week, then increase to 1 8 mg daily on the third week and going forward  Qty: 3 mL, Refills: 3    Associated Diagnoses: DM type 2 with diabetic peripheral neuropathy ([address] );  Morbid obesity (HCC)      lisinopril (ZESTRIL) 20 mg tablet Take 1 tablet (20 mg total) by mouth daily  Qty: 90 tablet, Refills: 1    Associated Diagnoses: Essential hypertension      metFORMIN (GLUCOPHAGE) 1000 MG tablet Take 1 tablet (1,000 mg total) by mouth 2 (two) times a day with meals  Qty: 180 tablet, Refills: 1    Associated Diagnoses: Type 2 diabetes mellitus with hyperglycemia, without long-term current use of insulin (HCC)      Omega-3 Fatty Acids (fish oil) 1,000 mg Take 1 capsule (1,000 mg total) by mouth 2 (two) times a day  Qty: 180 capsule, Refills: 2    Associated Diagnoses: Dyslipidemia      ticagrelor (Brilinta) 90 MG Take 1 tablet (90 mg total) by mouth every 12 (twelve) hours  Qty: 180 tablet, Refills: 2    Associated Diagnoses: Coronary artery disease, unspecified vessel or lesion type, unspecified whether angina present, unspecified whether native or transplanted heart      zinc sulfate (ZINCATE) 220 mg capsule Take 1 capsule (220 mg total) by mouth daily  Qty: 30 capsule, Refills: 0    Associated Diagnoses: Cellulitis of abdominal wall             No discharge procedures on file      PDMP Review       Value Time User    PDMP Reviewed  Yes 4/29/2021 10:52 AM Hardik Mota PA-C          ED Provider  Electronically Signed by           Helga Calvo DO  05/04/22 3742

## 2022-05-05 NOTE — PROGRESS NOTES
[address] [facility] Practice Progress Note - Lianna Rangel 37 y o  male MRN: [id number]  Unit/Bed#: [address] [place] [address] Encounter: [id number]    Summary:  Abdominal pain is present but improved, patient was able to tolerate meals today  Will continue IV antibiotics and monitor response  F: sodium chloride 0 45 % with KCl 20 mEq/L, Last Rate: 125 mL/hr (05/04/22 2244)  E: Replete PRN  N: Diet Markie/CHO Controlled; Consistent Carbohydrate Diet Level 2 (5 carb servings/75 grams CHO/meal)   D: Held due to bleed    Disposition: Continue med-surg level of care   Code Status: Level 1 - Full Code    ASSESSMENT/PLAN:    * Colitis  Assessment & Plan  Presented with 3 day history of abdominal pain associated with nausea vomiting and bloody diarrhea  Found to have leukocytosis (16 K),  CT showing thickening of the left colon indicative of colitis  Given acute onset likely infectious/food-borne illness  He did receive dose of Flagyl in the ED and Toradol/Dilaudid for pain  · Zosyn 3 375 mg  · Stool studies pending (C diff toxin PCR, Giardia antigen, O&P, stool PCR)  · Tylenol for mild pain and Toradol for moderate pain  · IVFs: Half NS and K 20mEq 125 cc/hr  · Monitor CBC/vitals  · Monitor CMP and lipase in the AM  · Consider GI consult    Abscess of multiple sites of buttock  Assessment & Plan  CT on admission showing small rim-enhancing subcutaneous fluid collections are also noted at the level of the upper buttock crease, measuring 3 4 x 2 7 cm on the right and 2 6 x 1 8 cm on the left  Correlate for small abscesses  · Antibiotics as noted  · Consider I&D  · Wound care nurse on board        Sepsis [facility])  Assessment & Plan  Patient had tachycardic, (HR 94), with leukocytosis (16 K)  Source of infection is colitis identified on CT scan    · See plan under colitis      History of hidradenitis suppurativa  Assessment & Plan  Patient has history of hidranitis suppurativa with wounds currently present on left groin and abdominal pannus  · Local wound care  · Antibiotics as noted       Swelling of left lower extremity  Assessment & Plan  Patient has chronic left lower leg lymphedema s/p lymph node removal during the vascular surgery  Denies any calf tenderness on admission  Negative Trista sign  · DVT ppx with SQL and SCD    Nocturnal hypoxia  Assessment & Plan  Satting well on admission on room air  Was found to have nocturnal hypoxia on prior admission in April, 22  Had overnight O2 study during last admission which showed significant hypoxemia with lowest desaturation of 59%  At discharge he was advised to follow-up with pulmonology for outpatient sleep study but he has not done this yet  Was also found to require oxygen with exertion during last admission and was discharged with plan for 2 L with activity  · Oxygen as needed overnight  · Monitor respiratory status   · Encourage f/u outpatient for sleep study    Morbid obesity with body mass index (BMI) of 50 0 to 59 9 in adult [facility])  Assessment & Plan  · Diet/exercise counseling, consider outpatient bariatric consult    Type 2 diabetes mellitus with diabetic polyneuropathy, without long-term current use of insulin [facility])  Assessment & Plan  Lab Results   Component Value Date    HGBA1C 7 5 (H) 03/30/2022       Recent Labs     05/04/22  2326 05/05/22  0719 05/05/22  1059   POCGLU 212* 96 151*       Patient A1c noted above, well-controlled  Home meds metformin 1000 mg bid and recently started on victoza on 4/25, in titration process, on 1 2 mg currently, received injection this week  · Hold home victoza and metformin  · ISS   · Monitor serum glucose    · Monitor for basal and meal time insuline requirement IP      Essential hypertension  Assessment & Plan  BP's on admission was elevated at 415 systolic but has since stabilized  · Continue home medications  ? Amlodipine 10 mg, PO, Daily   ? Carvedilol 6 25 mg, PO, BID  ?  Lisinopril 20 mg, PO, Daily   · Monitor BP Dyslipidemia  Assessment & Plan  LiPID panel 20: T chol 195, , HDL 29,   · Continue home atorvastatin 80 mg q h s  And fish oil 1000 mg b i d         Coronary artery disease  Assessment & Plan  Patient with a history of cardiac catheterization in  and   Stents placed in [facility] in , 2 total  Follows with cardiology outpatient  Denies any chest pain and shortness of breath on admission  Patient states not taking his brilinta  Last saw cards in  and as that time was on the brilinta, unclear why he stopped the medicine      · Continue home medications  · Carvedilol 6 25 mg, PO, BID  · Aspirin 81 mg, PO, Daily   · F/u outpatient Cardiology to determine the need for continuation of Brilinta      SUBJECTIVE    Patient evaluated at bedside and was calm, NAD  He endorsed abdominal discomfort but denies any N/V/D, fever and chills  He tolerated breakfast today  OBJECTIVE    Vitals   Vitals:    22 0022 22 0312 22 0755 22 0758   BP:  128/72 110/92 110/92   BP Location:  Left arm     Pulse:  58 76 81   Resp:  18     Temp:  97 6 °F (36 4 °C) 97 6 °F (36 4 °C) 97 6 °F (36 4 °C)   TempSrc:  Oral     SpO2:  96% 91% 93%   Weight:       Height: 5' 10" (1 778 m)        Temp (24hrs), Av 3 °F (36 8 °C), Min:97 6 °F (36 4 °C), Max:99 6 °F (37 6 °C)  Current: Temperature: 97 6 °F (36 4 °C)          Respiratory:  SpO2: SpO2: 90 %, SpO2 Activity: SpO2 Activity: At Rest, SpO2 Device: O2 Device: None (Room air)       Physical Exam  Vitals and nursing note reviewed  Constitutional:       General: He is not in acute distress  Appearance: He is well-developed  He is obese  Cardiovascular:      Rate and Rhythm: Normal rate and regular rhythm  Pulses: Normal pulses  Heart sounds: Normal heart sounds  No murmur heard  Pulmonary:      Effort: Pulmonary effort is normal  No respiratory distress  Breath sounds: Normal breath sounds     Abdominal: General: Bowel sounds are normal  There is no distension  Palpations: Abdomen is soft  There is no mass  Tenderness: There is abdominal tenderness (generalized)  There is no guarding or rebound  Comments: Scarring/keloid under the left anterior abdominal pannus  Slight erythema and warmth of the lower abdomen   Musculoskeletal:      Right lower leg: No edema  Left lower leg: Edema (chronic) present  Comments: Varicose veins bilaterally   Skin:     General: Skin is warm and dry  Comments: Soft nontender mass about 1 cm in diameter on the left upper buttock  2 Abscess to the left and right of the midline buttock     Neurological:      Mental Status: He is alert  Psychiatric:         Mood and Affect: Mood normal          Behavior: Behavior normal          Laboratory and Diagnostics:  Results from last 7 days   Lab Units 05/05/22  0558 05/04/22  1707   WBC Thousand/uL 12 40* 16 51*   HEMOGLOBIN g/dL 11 7* 13 2   HEMATOCRIT % 39 5 43 4   PLATELETS Thousands/uL 279 344   NEUTROS PCT % 75 85*   MONOS PCT % 8 6     Results from last 7 days   Lab Units 05/05/22  0558 05/04/22  1707   SODIUM mmol/L 136 136   POTASSIUM mmol/L 3 9 4 0   CHLORIDE mmol/L 99* 97*   CO2 mmol/L 30 32   ANION GAP mmol/L 7 7   BUN mg/dL 22 16   CREATININE mg/dL 1 38* 1 09   CALCIUM mg/dL 8 0* 8 6   GLUCOSE RANDOM mg/dL 109 137   ALT U/L 13 16   AST U/L 13 15   ALK PHOS U/L 88 99   ALBUMIN g/dL 2 5* 2 7*   TOTAL BILIRUBIN mg/dL 0 33 0 35     Results from last 7 days   Lab Units 05/05/22  0558   MAGNESIUM mg/dL 1 7   PHOSPHORUS mg/dL 5 1*               Results from last 7 days   Lab Units 05/04/22  1707   LACTIC ACID mmol/L 0 7     ABG:    VBG:          Micro        Imaging: I have reviewed all pertinent reports       Intake and Output  I/O       05/03 0701 05/04 0700 05/04 0701 05/05 0700 05/05 0701 05/06 0700    IV Piggyback  1000     Total Intake(mL/kg)  1000 (6 1)     Net  +1000                  Height and Weights Height: 5' 10" (177 8 cm)  IBW (Ideal Body Weight): 73 kg  Body mass index is 52 09 kg/m²  Weight (last 2 days)     Date/Time Weight    05/04/22 1610 165 (363)            Nutrition       Diet Orders   (From admission, onward)             Start     Ordered    05/04/22 2039  Diet Markie/CHO Controlled; Consistent Carbohydrate Diet Level 2 (5 carb servings/75 grams CHO/meal)  Diet effective now        References:    Nutrtion Support Algorithm Enteral vs  Parenteral   Question Answer Comment   Diet Type Markie/CHO Controlled    Markie/CHO Controlled Consistent Carbohydrate Diet Level 2 (5 carb servings/75 grams CHO/meal)    RD to adjust diet per protocol?  Yes        05/04/22 2041                  Active Medications  Scheduled Meds:  Current Facility-Administered Medications   Medication Dose Route Frequency Provider Last Rate    acetaminophen  650 mg Oral Q6H PRN Conchetta Bio, DO      albuterol  2 puff Inhalation Q6H PRN Conchetta Bio, DO      amLODIPine  10 mg Oral Daily Conchetta Bio, DO      aspirin  81 mg Oral Daily Conchetta Bio, DO      atorvastatin  80 mg Oral Daily With [employer], DO      carvedilol  6 25 mg Oral BID With Meals Conchetta Bio, DO      enoxaparin  60 mg Subcutaneous BID Conchetta Bio, DO      fish oil  1,000 mg Oral BID Conchetta Bio, DO      insulin lispro  2-12 Units Subcutaneous 4x Daily (AC & HS) Lonnie Verdin MD      ketorolac  30 mg Intravenous Q6H PRN Lonnie Verdin MD      lisinopril  20 mg Oral Daily Conchetta Bio, DO      ondansetron  4 mg Intravenous Q6H PRN Conchetta Bio, DO      piperacillin-tazobactam  4 5 g Intravenous Q6H Lonnie Verdin MD 4 5 g (05/05/22 7477)    sodium chloride 0 45 % with KCl 20 mEq/L  125 mL/hr Intravenous Continuous Lonnie Verdin  mL/hr (05/04/22 3995)    zinc sulfate  220 mg Oral Daily Conchetta Bio, DO       Continuous Infusions:  sodium chloride 0 45 % with KCl 20 mEq/L, 125 mL/hr, Last Rate: 125 mL/hr (05/04/22 8834)      PRN Meds:   acetaminophen, 650 mg, Q6H PRN  albuterol, 2 puff, Q6H PRN  ketorolac, 30 mg, Q6H PRN  ondansetron, 4 mg, Q6H PRN        Invasive Devices Review  Invasive Devices  Report    Peripheral Intravenous Line            Peripheral IV 05/04/22 Right Antecubital <1 day                    Jairon Cain MD  Family Medicine PGY-1    Portions of the record may have been created with voice recognition software  Occasional wrong word or "sound a like" substitutions may have occurred due to the inherent limitations of voice recognition software    Read the chart carefully and recognize, using context, where substitutions have occurred

## 2022-05-05 NOTE — ASSESSMENT & PLAN NOTE
CT on admission showing small rim-enhancing subcutaneous fluid collections are also noted at the level of the upper buttock crease, measuring 3 4 x 2 7 cm on the right and 2 6 x 1 8 cm on the left  Correlate for small abscesses    · Antibiotics as noted  · Consider I&D  · Surgery-chronic issue, I&D is not indicated  · Wound care nurse on board

## 2022-05-06 LAB
ANION GAP SERPL CALCULATED.3IONS-SCNC: 9 MMOL/L (ref 4–13)
BASOPHILS # BLD AUTO: 0.05 THOUSANDS/ΜL (ref 0–0.1)
BASOPHILS NFR BLD AUTO: 1 % (ref 0–1)
BUN SERPL-MCNC: 21 MG/DL (ref 5–25)
CALCIUM SERPL-MCNC: 8 MG/DL (ref 8.3–10.1)
CHLORIDE SERPL-SCNC: 103 MMOL/L (ref 100–108)
CO2 SERPL-SCNC: 24 MMOL/L (ref 21–32)
CREAT SERPL-MCNC: 1.15 MG/DL (ref 0.6–1.3)
EOSINOPHIL # BLD AUTO: 0.38 THOUSAND/ΜL (ref 0–0.61)
EOSINOPHIL NFR BLD AUTO: 4 % (ref 0–6)
ERYTHROCYTE [DISTWIDTH] IN BLOOD BY AUTOMATED COUNT: 18.9 % (ref 11.6–15.1)
GFR SERPL CREATININE-BSD FRML MDRD: 79 ML/MIN/1.73SQ M
GLUCOSE P FAST SERPL-MCNC: 126 MG/DL (ref 65–99)
GLUCOSE SERPL-MCNC: 106 MG/DL (ref 65–140)
GLUCOSE SERPL-MCNC: 126 MG/DL (ref 65–140)
GLUCOSE SERPL-MCNC: 136 MG/DL (ref 65–140)
GLUCOSE SERPL-MCNC: 154 MG/DL (ref 65–140)
GLUCOSE SERPL-MCNC: 180 MG/DL (ref 65–140)
HCT VFR BLD AUTO: 38.7 % (ref 36.5–49.3)
HGB BLD-MCNC: 11.9 G/DL (ref 12–17)
IMM GRANULOCYTES # BLD AUTO: 0.04 THOUSAND/UL (ref 0–0.2)
IMM GRANULOCYTES NFR BLD AUTO: 0 % (ref 0–2)
LYMPHOCYTES # BLD AUTO: 1.33 THOUSANDS/ΜL (ref 0.6–4.47)
LYMPHOCYTES NFR BLD AUTO: 12 % (ref 14–44)
MAGNESIUM SERPL-MCNC: 1.8 MG/DL (ref 1.6–2.6)
MCH RBC QN AUTO: 24.1 PG (ref 26.8–34.3)
MCHC RBC AUTO-ENTMCNC: 30.7 G/DL (ref 31.4–37.4)
MCV RBC AUTO: 78 FL (ref 82–98)
MONOCYTES # BLD AUTO: 0.85 THOUSAND/ΜL (ref 0.17–1.22)
MONOCYTES NFR BLD AUTO: 8 % (ref 4–12)
MRSA NOSE QL CULT: NORMAL
NEUTROPHILS # BLD AUTO: 8.08 THOUSANDS/ΜL (ref 1.85–7.62)
NEUTS SEG NFR BLD AUTO: 75 % (ref 43–75)
NRBC BLD AUTO-RTO: 0 /100 WBCS
PLATELET # BLD AUTO: 281 THOUSANDS/UL (ref 149–390)
PMV BLD AUTO: 8.8 FL (ref 8.9–12.7)
POTASSIUM SERPL-SCNC: 4.2 MMOL/L (ref 3.5–5.3)
RBC # BLD AUTO: 4.94 MILLION/UL (ref 3.88–5.62)
SODIUM SERPL-SCNC: 136 MMOL/L (ref 136–145)
WBC # BLD AUTO: 10.73 THOUSAND/UL (ref 4.31–10.16)

## 2022-05-06 PROCEDURE — 87493 C DIFF AMPLIFIED PROBE: CPT | Performed by: STUDENT IN AN ORGANIZED HEALTH CARE EDUCATION/TRAINING PROGRAM

## 2022-05-06 PROCEDURE — 82948 REAGENT STRIP/BLOOD GLUCOSE: CPT

## 2022-05-06 PROCEDURE — 87329 GIARDIA AG IA: CPT | Performed by: STUDENT IN AN ORGANIZED HEALTH CARE EDUCATION/TRAINING PROGRAM

## 2022-05-06 PROCEDURE — 87505 NFCT AGENT DETECTION GI: CPT | Performed by: STUDENT IN AN ORGANIZED HEALTH CARE EDUCATION/TRAINING PROGRAM

## 2022-05-06 PROCEDURE — 83735 ASSAY OF MAGNESIUM: CPT

## 2022-05-06 PROCEDURE — 87177 OVA AND PARASITES SMEARS: CPT | Performed by: STUDENT IN AN ORGANIZED HEALTH CARE EDUCATION/TRAINING PROGRAM

## 2022-05-06 PROCEDURE — 99232 SBSQ HOSP IP/OBS MODERATE 35: CPT | Performed by: FAMILY MEDICINE

## 2022-05-06 PROCEDURE — 87209 SMEAR COMPLEX STAIN: CPT | Performed by: STUDENT IN AN ORGANIZED HEALTH CARE EDUCATION/TRAINING PROGRAM

## 2022-05-06 PROCEDURE — 80048 BASIC METABOLIC PNL TOTAL CA: CPT

## 2022-05-06 PROCEDURE — 85025 COMPLETE CBC W/AUTO DIFF WBC: CPT

## 2022-05-06 RX ORDER — OXYCODONE HYDROCHLORIDE AND ACETAMINOPHEN 5; 325 MG/1; MG/1
1 TABLET ORAL ONCE
Status: COMPLETED | OUTPATIENT
Start: 2022-05-06 | End: 2022-05-06

## 2022-05-06 RX ORDER — DICYCLOMINE HYDROCHLORIDE 10 MG/1
20 CAPSULE ORAL ONCE
Status: COMPLETED | OUTPATIENT
Start: 2022-05-06 | End: 2022-05-06

## 2022-05-06 RX ORDER — OXYCODONE HYDROCHLORIDE 5 MG/1
5 TABLET ORAL ONCE
Status: COMPLETED | OUTPATIENT
Start: 2022-05-06 | End: 2022-05-06

## 2022-05-06 RX ADMIN — PIPERACILLIN SODIUM,TAZOBACTAM SODIUM 4.5 G: 4; .5 INJECTION, POWDER, FOR SOLUTION INTRAVENOUS at 17:17

## 2022-05-06 RX ADMIN — ASPIRIN 81 MG: 81 TABLET, COATED ORAL at 08:31

## 2022-05-06 RX ADMIN — ONDANSETRON 4 MG: 2 INJECTION INTRAMUSCULAR; INTRAVENOUS at 14:33

## 2022-05-06 RX ADMIN — PIPERACILLIN SODIUM,TAZOBACTAM SODIUM 4.5 G: 4; .5 INJECTION, POWDER, FOR SOLUTION INTRAVENOUS at 23:01

## 2022-05-06 RX ADMIN — PIPERACILLIN SODIUM,TAZOBACTAM SODIUM 4.5 G: 4; .5 INJECTION, POWDER, FOR SOLUTION INTRAVENOUS at 04:30

## 2022-05-06 RX ADMIN — CARVEDILOL 6.25 MG: 6.25 TABLET, FILM COATED ORAL at 17:18

## 2022-05-06 RX ADMIN — OXYCODONE HYDROCHLORIDE AND ACETAMINOPHEN 1 TABLET: 5; 325 TABLET ORAL at 15:14

## 2022-05-06 RX ADMIN — ONDANSETRON 4 MG: 2 INJECTION INTRAMUSCULAR; INTRAVENOUS at 08:36

## 2022-05-06 RX ADMIN — SODIUM CHLORIDE 125 ML/HR: 0.9 INJECTION, SOLUTION INTRAVENOUS at 17:17

## 2022-05-06 RX ADMIN — ENOXAPARIN SODIUM 60 MG: 60 INJECTION SUBCUTANEOUS at 08:31

## 2022-05-06 RX ADMIN — INSULIN LISPRO 2 UNITS: 100 INJECTION, SOLUTION INTRAVENOUS; SUBCUTANEOUS at 11:46

## 2022-05-06 RX ADMIN — DICYCLOMINE HYDROCHLORIDE 20 MG: 10 CAPSULE ORAL at 09:19

## 2022-05-06 RX ADMIN — OXYCODONE HYDROCHLORIDE 5 MG: 5 TABLET ORAL at 09:19

## 2022-05-06 RX ADMIN — ENOXAPARIN SODIUM 60 MG: 60 INJECTION SUBCUTANEOUS at 17:18

## 2022-05-06 RX ADMIN — INSULIN LISPRO 2 UNITS: 100 INJECTION, SOLUTION INTRAVENOUS; SUBCUTANEOUS at 23:00

## 2022-05-06 RX ADMIN — SODIUM CHLORIDE 125 ML/HR: 0.9 INJECTION, SOLUTION INTRAVENOUS at 04:28

## 2022-05-06 RX ADMIN — ATORVASTATIN CALCIUM 80 MG: 80 TABLET, FILM COATED ORAL at 17:18

## 2022-05-06 RX ADMIN — OMEGA-3 FATTY ACIDS CAP 1000 MG 1000 MG: 1000 CAP at 08:31

## 2022-05-06 RX ADMIN — OMEGA-3 FATTY ACIDS CAP 1000 MG 1000 MG: 1000 CAP at 17:18

## 2022-05-06 RX ADMIN — ZINC SULFATE 220 MG (50 MG) CAPSULE 220 MG: CAPSULE at 08:31

## 2022-05-06 RX ADMIN — PIPERACILLIN SODIUM,TAZOBACTAM SODIUM 4.5 G: 4; .5 INJECTION, POWDER, FOR SOLUTION INTRAVENOUS at 11:46

## 2022-05-06 NOTE — DISCHARGE INSTR - OTHER ORDERS
Skin care Plan:     1-Cleanse left medial thigh wound with wound cleanser and pat dry  Open Mesalt dressing, cut to size of wound and apply to left medial thigh wound in a single layer  Cover with ABD pad and secure with paper tape  Change daily & prn excessive drainage/dislodgement  2-Cleanse abdominal folds with foaming cleanser & dry thoroughly  Apply ABD pad to cover abdominal abscess cluster (2) and secure with paper tape  Change daily & as needed for excessive drainage/dislodgement  3-Cleanse sacro-buttocks with soap and water  Apply ABD pad to mid-sacrum and secure with paper tape  Change daily & as needed for excessive drainage/dislodgement  4-Encourage patient to turn/reposition himself  every 2 hrs for pressure re-distribution on skin   5-Elevate heels to offload pressure  6-Moisturize skin daily with skin nourishing cream  7-Bariatric pressure redistribution cushion in chair when out of bed  8-Hydraguard to bilateral heels 2x/day and as needed

## 2022-05-06 NOTE — WOUND OSTOMY CARE
Progress Note - Wound   Gio Yu 37 y o  male MRN: [id number]  Unit/Bed#: [address] [place] [address] Encounter: [id number]      Assessment: This is a 37year old male patient admitted on 5/4/22 with colitis  He has a medical history of DM2, HTN, morbid obesity, CAD and hidradenitis suppurativa  He was awake, alert & oriented x 3  He is partially independent with most ADL's and can get out of bed and go to bathroom on his own  He requested to have wound care order to left medial thigh changed from Maxorb Ag+ to Mesalt as he is using this at home and states that it is working well  This wound RN was consulted regarding the sacrobuttock area abscess cluster (multiple sites)  Assessment Findings:  1-Abscess of multiple sites of sacrobuttock area -  as was discussed with Oswaldo Grace PA-C, there is no evidence of infection to these abscesses and no need to attempt to pack them at this time  It was noted that in the future, the patient may benefit from incision and drainage of these abscesses if they become infected (dressing changed)  2-Abscess cluster of abdominal area - there are 2 abscesses noted which are draining  The periwound is noted to have chronic scar tissue  Orders written for skin and wound care (dressing applied)  3-Surgical wound to left medial thigh - this is a full thickness wound measuring 3 x 12 5 x 0 1 cm with 75% yellow slough & 25% pink granulation tissue  Periwound is intact with hyperpigmentation noted  Orders written for skin and wound care (dressing changed)  Skin care Plan:     1-Cleanse left medial thigh wound with NSS and pat dry  Open Mesalt dressing, cut to size of wound and apply to left medial thigh wound in a single layer  Cover with ABD pad and secure with paper tape  Change daily & prn excessive drainage/dislodgement  2-Cleanse abdominal folds with foaming cleanser & dry thoroughly  Apply ABD pad to cover abdominal abscess cluster (2) and secure with paper tape   Change daily & prn excessive drainage/dislodgement  3-Cleanse sacro-buttocks with soap and water  Apply ABD pad and secure with paper tape  Change daily & prn excessive drainage/dislodgement  4-Encourage patient to turn/reposition himself q2h for pressure re-distribution on skin   5-Elevate heels to offload pressure  6-Moisturize skin daily with skin nourishing cream         Wound 03/07/22 Surgical Leg Left;Upper;Medial (Active)   Wound Image   05/06/22 0842   Wound Description Granulation tissue;Pink;Slough; Yellow 05/06/22 0842   Sarika-wound Assessment Brown;Hyperpigmented;Pink 05/06/22 0842   Wound Length (cm) 3 cm 05/06/22 0842   Wound Width (cm) 12 5 cm 05/06/22 0842   Wound Depth (cm) 0 1 cm 05/06/22 0842   Wound Surface Area (cm^2) 37 5 cm^2 05/06/22 0842   Wound Volume (cm^3) 3 75 cm^3 05/06/22 0842   Calculated Wound Volume (cm^3) 3 75 cm^3 05/06/22 0842   Change in Wound Size % 36 22 05/06/22 0842   Wound Site Closure Open to air 05/06/22 0842     Drainage Amount Large 05/06/22 0842   Drainage Description Light green; Serosanguineous; Yellow 05/06/22 0842   Treatments Cleansed; 05/06/22 0842   Dressing ABD; Mesalt; Other (Comment) 05/06/22 7984   Wound packed? No 05/06/22 0842   Dressing Changed Changed 05/06/22 0842   Dressing Status Clean;Dry; Intact 05/06/22 0842       Wound 04/11/22 Incision Abdomen Left;Medial;Lower (Active)   Wound Image   05/06/22 0901   Sarika-wound Assessment Scar Tissue 05/06/22 0901   Drainage Amount Scant 05/06/22 0901   Drainage Description Serous 05/06/22 0901   Non-staged Wound Description Full thickness 05/06/22 0901   Treatments Cleansed 05/06/22 0901   Dressing ABD 05/06/22 0901   Wound packed? No 05/06/22 0901   Dressing Changed New 05/06/22 0901   Dressing Status Clean;Dry; Intact 05/06/22 0901       Wound 05/04/22 Leg Left; Inner; Lower (Active)   Wound Image   05/06/22 0859   Wound Description Non-blanchable erythema 05/06/22 0859   Sarika-wound Assessment Intact; no open areas 05/06/22 0859 Drainage Amount None 05/06/22 0859   Treatments Cleansed 05/06/22 0859   Dressing Pt refused moisture barrier or moisturizer 05/06/22 0859   Dressing Changed Open to air 05/06/22 0859       Wound 05/04/22 Other (comment) Sacrum (Active)   Wound Image   05/06/22 0855   Wound Description Cluster of multiple abscess 05/06/22 0855   Sarika-wound Assessment Scar Tissue 05/06/22 0855   Drainage Amount Moderate 05/06/22 0855   Drainage Description Serous 05/06/22 0855   Non-staged Wound Description Full thickness 05/06/22 0855   Treatments Cleansed 05/06/22 0855   Dressing ABD 05/06/22 0855   Wound packed? No 05/06/22 0855   Dressing Changed Changed 05/06/22 0855   Dressing Status Clean;Dry; Intact 05/06/22 0855     Discussed assessment findings, and plan of care/recommendations with [facility] [place]  Wound care will follow along with patient weekly, please call or tiger text with questions and concerns    Recommendations written as orders    Deb Neely RN, BSN, [employer]

## 2022-05-06 NOTE — CASE MANAGEMENT
Case Management Assessment & Discharge Planning Note    Patient name Yas Sharif  Location [facility] [address] [address] MRN [id number]  : 1980 Date 2022       Current Admission Date: 2022  Current Admission Diagnosis:Colitis   Patient Active Problem List    Diagnosis Date Noted    Colitis 2022    Sepsis ([facility] ) 2022    Abscess of multiple sites of buttock 2022    Microcytic anemia 2022    Swelling of left lower extremity 2022    History of hidradenitis suppurativa 2022    Nocturnal hypoxia 2021    Cellulitis of multiple sites of trunk 2021    Chest pain 2021    Cellulitis of left lower extremity 2021    H/O drainage of abscess 2021    Difficult intubation 2021    Morbid obesity with body mass index (BMI) of 50 0 to 59 9 in adult [facility]) 03/10/2021    Acute kidney injury ([facility] [address] [facility] ) 2021    Type 1 non-ST elevation myocardial infarction (NSTEMI) ([facility] ) 2020    Left groin mass 2020    Type 2 diabetes mellitus with diabetic polyneuropathy, without long-term current use of insulin [facility] ) 2018    Dyslipidemia 2017    Pilonidal cyst 2016    Essential hypertension 2016    Abnormal liver enzymes 2014    Coronary artery disease 2014      LOS (days): 0  Geometric Mean LOS (GMLOS) (days):   Days to GMLOS:     OBJECTIVE:     Current admission status: Observation    Preferred Pharmacy:   [facility] [address] [place] - [address]  [address]  [address] [facility],  [address] [address]  Phone: [phone] Fax: [phone]    Primary Care Provider: Sterling Sanchez MD    Primary Insurance: Vandana Neighbor  Secondary Insurance:     ASSESSMENT:  [address] [place] [address] 162 Representative - Father   Primary Phone: [phone] (Mobile)               Advance Directives  Does patient have a [address] [place] [address]?: No  Was patient offered paperwork?: Yes (not interested at this time)  Does patient currently have a Health Care decision maker?: Yes, please see Health Care Proxy section  Does patient have Advance Directives?: No  Was patient offered paperwork?: Yes (not interested at this time)  Primary Contact: Vandana Hidden    Readmission Root Cause  30 Day Readmission: No    Patient Information  Admitted from[de-identified] Home  Mental Status: Alert  During Assessment patient was accompanied by: Not accompanied during assessment  Assessment information provided by[de-identified] Patient  Primary Caregiver: Self  Support Systems: JUS AHUMADA of Residence: [address] [place] [address] do you live in?: [place]  Type of Current Residence: [address]  Upon entering residence, is there a bedroom on the main floor (no further steps)?: Yes  Upon entering residence, is there a bathroom on the main floor (no further steps)?: No  Indicate which floors of current residence have a bathroom (select all the apply):: 2nd Floor  Number of steps to 2nd floor from main floor: One Flight  In the last 12 months, was there a time when you were not able to pay the mortgage or rent on time?: No  In the last 12 months, how many places have you lived?: 1  In the last 12 months, was there a time when you did not have a steady place to sleep or slept in a shelter (including now)?: No  Homeless/housing insecurity resource given?: N/A  Living Arrangements: Lives w/ Son    Activities of Daily Living Prior to Admission  Functional Status: Independent  Completes ADLs independently?: Yes  Ambulates independently?: Yes  Does patient use assisted devices?: No  Does patient currently own DME?: No (per pt working with wound care center on lymphedema pump)  Does patient have a history of Outpatient Therapy (PT/OT)?: No (goes to wound care center every few weeks)  Does the patient have a history of Short-Term Rehab?: No  Does patient have a history of HHC?: Yes (Jared)  Does patient currently have [facility]?: No    Patient Information Continued  Income Source: Employed  Does patient have prescription coverage?: Yes (Reyna)  Within the past 12 months, you worried that your food would run out before you got the money to buy more : Never true  Within the past 12 months, the food you bought just didnt last and you didnt have money to get more : Never true  Food insecurity resource given?: N/A  Does patient receive dialysis treatments?: No  Does patient have a history of substance abuse?: No  Does patient have a history of Mental Health Diagnosis?: No    Means of Transportation  Means of Transport to Appts[de-identified] Drives Self  In the past 12 months, has lack of transportation kept you from medical appointments or from getting medications?: No  In the past 12 months, has lack of transportation kept you from meetings, work, or from getting things needed for daily living?: No  Was application for public transport provided?: N/A      DISCHARGE DETAILS:    Discharge planning discussed with[de-identified] Patient  Freedom of Choice: Yes  Comments - Freedom of Choice: SW met with pt to assess needs and discuss plans  Pt's plan is to return home with son when discharged  No discharge needs expressed or anticipated by pt at this time    CM contacted family/caregiver?: No- see comments (per pt-father recently moved to [address] [place] [address])    [address]         Is the patient interested in [facility] at discharge?: No    DME Referral Provided  Referral made for DME?: No    Other Referral/Resources/Interventions Provided:  Interventions: None Indicated    Treatment Team Recommendation: Home  Discharge Destination Plan[de-identified] Home  Transport at Discharge : [place]

## 2022-05-06 NOTE — UTILIZATION REVIEW
Continued Stay Review    Date: 5-6-22                           OBSERVATION 5-4-22 CHANGED TO INPATIENT 5-6-22 FOR CONTINUED TREATMENT OF COLITIS WITH IV ANTIBIOTICS      05/06/22 1439  Inpatient Admission  Once        Transfer Service: General Medicine       Question Answer   Level of Care Med Surg   Estimated length of stay More than 2 Midnights   Certification I certify that inpatient services are medically necessary for this patient for a duration of greater than two midnights  See H&P and MD Progress Notes for additional information about the patient's course of treatment  05/04/22 1931  Place in Observation  Once        Transfer Service: General Medicine       Question: Level of Care Answer: Med Surg            HPI:41 y o  male initially admitted on 5-4-22    Assessment/Plan:     Wound care assessment significant for multiple abscess sites of sacro buttock area and abdominal area  Surgical wound on mid thigh  Continue iv zosyn and iv  9% ns 125/hr for colitis  Consult gastroenterology if not improving   Bloody bowel movement this am           Vital Signs:     Date/Time Temp Pulse Resp BP MAP (mmHg) O2 Device   05/06/22 0729 -- -- -- -- -- None (Room air)   05/06/22 07:23:34 97 9 °F (36 6 °C) 75 20 105/64 78                  Pertinent Labs/Diagnostic Results:       Results from last 7 days   Lab Units 05/04/22  1658   SARS-COV-2  Negative     Results from last 7 days   Lab Units 05/06/22  0541 05/05/22  0558 05/04/22  1707   WBC Thousand/uL 10 73* 12 40* 16 51*   HEMOGLOBIN g/dL 11 9* 11 7* 13 2   HEMATOCRIT % 38 7 39 5 43 4   PLATELETS Thousands/uL 281 279 344   NEUTROS ABS Thousands/µL 8 08* 9 38* 13 93*         Results from last 7 days   Lab Units 05/06/22  0541 05/05/22  0558 05/04/22  1707   SODIUM mmol/L 136 136 136   POTASSIUM mmol/L 4 2 3 9 4 0   CHLORIDE mmol/L 103 99* 97*   CO2 mmol/L 24 30 32   ANION GAP mmol/L 9 7 7   BUN mg/dL 21 22 16   CREATININE mg/dL 1 15 1 38* 1 09   EGFR ml/min/1 73sq m 79 63 84   CALCIUM mg/dL 8 0* 8 0* 8 6   MAGNESIUM mg/dL 1 8 1 7  --    PHOSPHORUS mg/dL  --  5 1*  --      Results from last 7 days   Lab Units 05/05/22  0558 05/04/22  1707   AST U/L 13 15   ALT U/L 13 16   ALK PHOS U/L 88 99   TOTAL PROTEIN g/dL 7 5 8 2   ALBUMIN g/dL 2 5* 2 7*   TOTAL BILIRUBIN mg/dL 0 33 0 35     Results from last 7 days   Lab Units 05/06/22  1104 05/06/22  0720 05/05/22  2111 05/05/22  1600 05/05/22  1059 05/05/22  0719 05/04/22  2326   POC GLUCOSE mg/dl 154* 136 149* 112 151* 96 212*     Results from last 7 days   Lab Units 05/06/22  0541 05/05/22  0558 05/04/22  1707   GLUCOSE RANDOM mg/dL 126 109 137       Results from last 7 days   Lab Units 05/04/22  1707   LACTIC ACID mmol/L 0 7       Results from last 7 days   Lab Units 05/05/22  0558 05/04/22  1707   LIPASE u/L 102 81       Results from last 7 days   Lab Units 05/04/22  1658   INFLUENZA A PCR  Negative   INFLUENZA B PCR  Negative   RSV PCR  Negative       Scheduled Medications:    amLODIPine, 10 mg, Oral, Daily  aspirin, 81 mg, Oral, Daily  atorvastatin, 80 mg, Oral, Daily With Dinner  carvedilol, 6 25 mg, Oral, BID With Meals  enoxaparin, 60 mg, Subcutaneous, BID  fish oil, 1,000 mg, Oral, BID  insulin lispro, 2-12 Units, Subcutaneous, 4x Daily (AC & HS)  lisinopril, 20 mg, Oral, Daily  piperacillin-tazobactam, 4 5 g, Intravenous, Q6H  zinc sulfate, 220 mg, Oral, Daily      Continuous IV Infusions:  sodium chloride, 125 mL/hr, Intravenous, Continuous      PRN Meds:  acetaminophen, 650 mg, Oral, Q6H PRN  albuterol, 2 puff, Inhalation, Q6H PRN  ondansetron, 4 mg, Intravenous, Q6H PRN        Discharge Plan: to be determined     Network Utilization Review Department  ATTENTION: Please call with any questions or concerns to [phone] and carefully listen to the prompts so that you are directed to the right person   All voicemails are confidential   Jordana Wagoner all requests for admission clinical reviews, approved or denied determinations and any other requests to dedicated fax number below belonging to the campus where the patient is receiving treatment   List of dedicated fax numbers for the Facilities:  [address] [place] [address] DENIALS (Administrative/Medical Necessity) [phone]   [address] (Maternity/NICU/Pediatrics) [phone]   [address] [place] [address]  [address] [facility] [place] [address] [place] [address] [id number] [place] [address] [address] [facility] [place] [address] [phone]

## 2022-05-06 NOTE — PROGRESS NOTES
[facility] Practice Progress Note - Yas Sharif 37 y o  male MRN: [id number]  Unit/Bed#: [address] [place] [address] Encounter: [id number]    Summary:  Patient tolerating meals, pain present but improving  Had episode of bloody BM this AM  Will continue IV antibiotics and monitor response  F: sodium chloride 0 45 % with KCl 20 mEq/L, Last Rate: 125 mL/hr (05/04/22 2244)  E: Replete PRN  N: Diet Markie/CHO Controlled; Consistent Carbohydrate Diet Level 2 (5 carb servings/75 grams CHO/meal)   D: Held due to bleed    Disposition: Continue med-surg level of care   Code Status: Level 1 - Full Code    ASSESSMENT/PLAN:    * Colitis  Assessment & Plan  Presented with 3 day history of abdominal pain associated with nausea vomiting and bloody diarrhea  Found to have leukocytosis (16 K),  CT showing thickening of the left colon indicative of colitis  Given acute onset likely infectious/food-borne illness  He did receive dose of Flagyl in the ED and Toradol/Dilaudid for pain  · Continue Zosyn 3 375 mg  · Stool studies pending (C diff toxin PCR, Giardia antigen, O&P, stool PCR)  · Tylenol for mild pain and Toradol for moderate pain  · Bentyl  · IVFs: Normal saline 125 cc/hr  · Monitor CBC/vitals  · Monitor CMP/electrolyte levels  · Lipase wnl  · Consider GI consult if no improvement    Abscess of multiple sites of buttock  Assessment & Plan  CT on admission showing small rim-enhancing subcutaneous fluid collections are also noted at the level of the upper buttock crease, measuring 3 4 x 2 7 cm on the right and 2 6 x 1 8 cm on the left  Correlate for small abscesses  · Antibiotics as noted  · Consider I&D  · Wound care nurse on board        Sepsis [facility])  Assessment & Plan  Patient had tachycardic, (HR 94), with leukocytosis (16 K)  Source of infection is colitis identified on CT scan    · See plan under colitis      History of hidradenitis suppurativa  Assessment & Plan  Patient has history of hidranitis suppurativa with wounds currently present on left groin and abdominal pannus  · Local wound care  · Antibiotics as noted       Swelling of left lower extremity  Assessment & Plan  Patient has chronic left lower leg lymphedema s/p lymph node removal during the vascular surgery  Denies any calf tenderness on admission  Negative Trista sign  · DVT ppx with SQL and SCD    Nocturnal hypoxia  Assessment & Plan  Satting well on admission on room air  Was found to have nocturnal hypoxia on prior admission in April, 22  Had overnight O2 study during last admission which showed significant hypoxemia with lowest desaturation of 59%  At discharge he was advised to follow-up with pulmonology for outpatient sleep study but he has not done this yet  Was also found to require oxygen with exertion during last admission and was discharged with plan for 2 L with activity  · Oxygen as needed overnight  · Monitor respiratory status   · Encourage f/u outpatient for sleep study    Morbid obesity with body mass index (BMI) of 50 0 to 59 9 in adult [facility])  Assessment & Plan  · Diet/exercise counseling, consider outpatient bariatric consult    Type 2 diabetes mellitus with diabetic polyneuropathy, without long-term current use of insulin [facility])  Assessment & Plan  Lab Results   Component Value Date    HGBA1C 7 5 (H) 03/30/2022       Recent Labs     05/05/22  1059 05/05/22  1600 05/05/22  2111 05/06/22  0720   POCGLU 151* 112 149* 136       Patient A1c noted above, well-controlled  Home meds metformin 1000 mg bid and recently started on victoza on 4/25, in titration process, on 1 2 mg currently, received injection this week  · Hold home victoza and metformin  · ISS   · Monitor serum glucose    · Monitor for basal and meal time insuline requirement IP      Essential hypertension  Assessment & Plan  BP's on admission was elevated at 885 systolic but has since stabilized  · Continue home medications  ? Amlodipine 10 mg, PO, Daily   ?  Carvedilol 6 25 mg, PO, BID  ? Lisinopril 20 mg, PO, Daily   · Monitor BP       Dyslipidemia  Assessment & Plan  LiPID panel 20: T chol 195, , HDL 29,   · Continue home atorvastatin 80 mg q h s  And fish oil 1000 mg b i d         Coronary artery disease  Assessment & Plan  Patient with a history of cardiac catheterization in  and   Stents placed in [facility] in , 2 total  Follows with cardiology outpatient  Denies any chest pain and shortness of breath on admission  Patient states not taking his brilinta  Last saw cards in  and as that time was on the brilinta, unclear why he stopped the medicine      · Continue home medications  · Carvedilol 6 25 mg, PO, BID  · Aspirin 81 mg, PO, Daily   · F/u outpatient Cardiology to determine the need for continuation of Brilinta      SUBJECTIVE    Patient evaluated at bedside and was calm, NAD  He continues to have crampy abdominal pain that is 8/10 on evaluation and improves with pain meds  He denies N/V, fever,chills  Per nurse patient had episode of Bloody BM this morning  OBJECTIVE    Vitals   Vitals:    22 1457 22 1717 22 0723   BP: 130/74 136/71 114/59 105/64   BP Location:    Left arm   Pulse: 80 86 61 75   Resp:    20   Temp: 98 2 °F (36 8 °C)  97 9 °F (36 6 °C) 97 9 °F (36 6 °C)   TempSrc:    Oral   SpO2: 91% 90% (!) 87%    Weight:       Height:         Temp (24hrs), Av 8 °F (36 6 °C), Min:97 6 °F (36 4 °C), Max:98 2 °F (36 8 °C)  Current: Temperature: 97 9 °F (36 6 °C)          Respiratory:  SpO2: SpO2: (!) 87 %, SpO2 Activity: SpO2 Activity: At Rest, SpO2 Device: O2 Device: None (Room air)       Physical Exam  Vitals and nursing note reviewed  Constitutional:       General: He is not in acute distress  Appearance: He is well-developed  He is obese  Cardiovascular:      Rate and Rhythm: Normal rate and regular rhythm  Pulses: Normal pulses  Heart sounds: Normal heart sounds  No murmur heard        Pulmonary: Effort: Pulmonary effort is normal  No respiratory distress  Breath sounds: Normal breath sounds  Abdominal:      General: Bowel sounds are normal  There is no distension  Palpations: Abdomen is soft  There is no mass  Tenderness: There is no abdominal tenderness (generalized)  There is no guarding or rebound  Comments: Scarring/keloid under the left anterior abdominal pannus  Slight erythema and warmth of the lower abdomen   Musculoskeletal:      Right lower leg: No edema  Left lower leg: Edema (chronic) present  Comments: Varicose veins bilaterally   Skin:     General: Skin is warm  Comments: Soft nontender mass about 1 cm in diameter on the left upper buttock  2 Abscess to the left and right of the midline buttock     Neurological:      Mental Status: He is alert     Psychiatric:         Mood and Affect: Mood normal          Behavior: Behavior normal          Laboratory and Diagnostics:  Results from last 7 days   Lab Units 05/06/22  0541 05/05/22  0558 05/04/22  1707   WBC Thousand/uL 10 73* 12 40* 16 51*   HEMOGLOBIN g/dL 11 9* 11 7* 13 2   HEMATOCRIT % 38 7 39 5 43 4   PLATELETS Thousands/uL 281 279 344   NEUTROS PCT % 75 75 85*   MONOS PCT % 8 8 6     Results from last 7 days   Lab Units 05/06/22  0541 05/05/22  0558 05/04/22  1707   SODIUM mmol/L 136 136 136   POTASSIUM mmol/L 4 2 3 9 4 0   CHLORIDE mmol/L 103 99* 97*   CO2 mmol/L 24 30 32   ANION GAP mmol/L 9 7 7   BUN mg/dL 21 22 16   CREATININE mg/dL 1 15 1 38* 1 09   CALCIUM mg/dL 8 0* 8 0* 8 6   GLUCOSE RANDOM mg/dL 126 109 137   ALT U/L  --  13 16   AST U/L  --  13 15   ALK PHOS U/L  --  88 99   ALBUMIN g/dL  --  2 5* 2 7*   TOTAL BILIRUBIN mg/dL  --  0 33 0 35     Results from last 7 days   Lab Units 05/06/22  0541 05/05/22  0558   MAGNESIUM mg/dL 1 8 1 7   PHOSPHORUS mg/dL  --  5 1*               Results from last 7 days   Lab Units 05/04/22  1707   LACTIC ACID mmol/L 0 7     ABG:    VBG:          Micro Imaging: I have reviewed all pertinent reports  Intake and Output  I/O       05/03 0701 05/04 0700 05/04 0701 05/05 0700 05/05 0701 05/06 0700    IV Piggyback  1000     Total Intake(mL/kg)  1000 (6 1)     Net  +1000                  Height and Weights   Height: 5' 10" (177 8 cm)  IBW (Ideal Body Weight): 73 kg  Body mass index is 52 09 kg/m²  Weight (last 2 days)     Date/Time Weight    05/04/22 1610 165 (363)            Nutrition       Diet Orders   (From admission, onward)             Start     Ordered    05/05/22 1117  Room Service  Once        Question:  Type of Service  Answer:  Room Service-Appropriate    05/05/22 1117    05/04/22 2039  Diet Markie/CHO Controlled; Consistent Carbohydrate Diet Level 2 (5 carb servings/75 grams CHO/meal)  Diet effective now        References:    Nutrtion Support Algorithm Enteral vs  Parenteral   Question Answer Comment   Diet Type Markie/CHO Controlled    Markie/CHO Controlled Consistent Carbohydrate Diet Level 2 (5 carb servings/75 grams CHO/meal)    RD to adjust diet per protocol?  Yes        05/04/22 2041                  Active Medications  Scheduled Meds:  Current Facility-Administered Medications   Medication Dose Route Frequency Provider Last Rate    acetaminophen  650 mg Oral Q6H PRN Andrew Ada, DO      albuterol  2 puff Inhalation Q6H PRN Andrew Ada, DO      amLODIPine  10 mg Oral Daily Andrew Ada, DO      aspirin  81 mg Oral Daily Andrew Ada, DO      atorvastatin  80 mg Oral Daily With [employer], DO      carvedilol  6 25 mg Oral BID With Meals Andrew Ada, DO      enoxaparin  60 mg Subcutaneous BID Andrew Fujnorm, DO      fish oil  1,000 mg Oral BID Andrew Flaviawa, DO      insulin lispro  2-12 Units Subcutaneous 4x Daily (AC & HS) 805 Castella MD Xiang      lisinopril  20 mg Oral Daily Andrewbrittany Caballero, DO      ondansetron  4 mg Intravenous Q6H PRN Andrewbrittany Caballero, DO     Osiel Pila piperacillin-tazobactam  4 5 g Intravenous Q6H Htet Erlin Chambers MD 4 5 g (05/06/22 0430)    sodium chloride  125 mL/hr Intravenous Continuous Htet Erlin Chambers  mL/hr (05/06/22 0428)    zinc sulfate  220 mg Oral Daily Cecile Jin DO       Continuous Infusions:  sodium chloride, 125 mL/hr, Last Rate: 125 mL/hr (05/06/22 0428)      PRN Meds:   acetaminophen, 650 mg, Q6H PRN  albuterol, 2 puff, Q6H PRN  ondansetron, 4 mg, Q6H PRN        Invasive Devices Review  Invasive Devices  Report    Peripheral Intravenous Line            Peripheral IV 05/04/22 Right Antecubital 1 day                    Jm Mae MD  Family Medicine PGY-1    Portions of the record may have been created with voice recognition software  Occasional wrong word or "sound a like" substitutions may have occurred due to the inherent limitations of voice recognition software    Read the chart carefully and recognize, using context, where substitutions have occurred

## 2022-05-07 VITALS
HEART RATE: 60 BPM | BODY MASS INDEX: 45.1 KG/M2 | TEMPERATURE: 97.6 F | SYSTOLIC BLOOD PRESSURE: 145 MMHG | OXYGEN SATURATION: 90 % | WEIGHT: 315 LBS | HEIGHT: 70 IN | DIASTOLIC BLOOD PRESSURE: 63 MMHG | RESPIRATION RATE: 18 BRPM

## 2022-05-07 PROBLEM — A41.9 SEPSIS (HCC): Status: RESOLVED | Noted: 2022-05-04 | Resolved: 2022-05-07

## 2022-05-07 LAB
ANION GAP SERPL CALCULATED.3IONS-SCNC: 8 MMOL/L (ref 4–13)
BASOPHILS # BLD AUTO: 0.04 THOUSANDS/ΜL (ref 0–0.1)
BASOPHILS NFR BLD AUTO: 0 % (ref 0–1)
BUN SERPL-MCNC: 15 MG/DL (ref 5–25)
C DIFF TOX GENS STL QL NAA+PROBE: NEGATIVE
CALCIUM SERPL-MCNC: 8.3 MG/DL (ref 8.3–10.1)
CAMPYLOBACTER DNA SPEC NAA+PROBE: NORMAL
CHLORIDE SERPL-SCNC: 103 MMOL/L (ref 100–108)
CO2 SERPL-SCNC: 28 MMOL/L (ref 21–32)
CREAT SERPL-MCNC: 1.07 MG/DL (ref 0.6–1.3)
EOSINOPHIL # BLD AUTO: 0.35 THOUSAND/ΜL (ref 0–0.61)
EOSINOPHIL NFR BLD AUTO: 4 % (ref 0–6)
ERYTHROCYTE [DISTWIDTH] IN BLOOD BY AUTOMATED COUNT: 18.6 % (ref 11.6–15.1)
G LAMBLIA AG STL QL IA: NEGATIVE
GFR SERPL CREATININE-BSD FRML MDRD: 86 ML/MIN/1.73SQ M
GLUCOSE SERPL-MCNC: 120 MG/DL (ref 65–140)
GLUCOSE SERPL-MCNC: 126 MG/DL (ref 65–140)
GLUCOSE SERPL-MCNC: 131 MG/DL (ref 65–140)
HCT VFR BLD AUTO: 39.5 % (ref 36.5–49.3)
HGB BLD-MCNC: 11.7 G/DL (ref 12–17)
IMM GRANULOCYTES # BLD AUTO: 0.04 THOUSAND/UL (ref 0–0.2)
IMM GRANULOCYTES NFR BLD AUTO: 0 % (ref 0–2)
LYMPHOCYTES # BLD AUTO: 1.26 THOUSANDS/ΜL (ref 0.6–4.47)
LYMPHOCYTES NFR BLD AUTO: 13 % (ref 14–44)
MCH RBC QN AUTO: 24.5 PG (ref 26.8–34.3)
MCHC RBC AUTO-ENTMCNC: 29.6 G/DL (ref 31.4–37.4)
MCV RBC AUTO: 83 FL (ref 82–98)
MONOCYTES # BLD AUTO: 0.51 THOUSAND/ΜL (ref 0.17–1.22)
MONOCYTES NFR BLD AUTO: 5 % (ref 4–12)
NEUTROPHILS # BLD AUTO: 7.67 THOUSANDS/ΜL (ref 1.85–7.62)
NEUTS SEG NFR BLD AUTO: 78 % (ref 43–75)
NRBC BLD AUTO-RTO: 0 /100 WBCS
PLATELET # BLD AUTO: 263 THOUSANDS/UL (ref 149–390)
PMV BLD AUTO: 9.3 FL (ref 8.9–12.7)
POTASSIUM SERPL-SCNC: 4.5 MMOL/L (ref 3.5–5.3)
RBC # BLD AUTO: 4.78 MILLION/UL (ref 3.88–5.62)
SALMONELLA DNA SPEC QL NAA+PROBE: NORMAL
SHIGA TOXIN STX GENE SPEC NAA+PROBE: NORMAL
SHIGELLA DNA SPEC QL NAA+PROBE: NORMAL
SODIUM SERPL-SCNC: 139 MMOL/L (ref 136–145)
WBC # BLD AUTO: 9.87 THOUSAND/UL (ref 4.31–10.16)

## 2022-05-07 PROCEDURE — 99238 HOSP IP/OBS DSCHRG MGMT 30/<: CPT | Performed by: STUDENT IN AN ORGANIZED HEALTH CARE EDUCATION/TRAINING PROGRAM

## 2022-05-07 PROCEDURE — 82948 REAGENT STRIP/BLOOD GLUCOSE: CPT

## 2022-05-07 PROCEDURE — 80048 BASIC METABOLIC PNL TOTAL CA: CPT

## 2022-05-07 PROCEDURE — 85025 COMPLETE CBC W/AUTO DIFF WBC: CPT

## 2022-05-07 PROCEDURE — NC001 PR NO CHARGE: Performed by: STUDENT IN AN ORGANIZED HEALTH CARE EDUCATION/TRAINING PROGRAM

## 2022-05-07 RX ORDER — DICYCLOMINE HYDROCHLORIDE 10 MG/1
20 CAPSULE ORAL ONCE
Status: COMPLETED | OUTPATIENT
Start: 2022-05-07 | End: 2022-05-07

## 2022-05-07 RX ORDER — METRONIDAZOLE 500 MG/1
500 TABLET ORAL EVERY 8 HOURS SCHEDULED
Qty: 24 TABLET | Refills: 0 | Status: SHIPPED | OUTPATIENT
Start: 2022-05-07 | End: 2022-05-15

## 2022-05-07 RX ORDER — IBUPROFEN 600 MG/1
600 TABLET ORAL EVERY 6 HOURS PRN
Qty: 20 TABLET | Refills: 0 | Status: SHIPPED | OUTPATIENT
Start: 2022-05-07 | End: 2022-06-15

## 2022-05-07 RX ORDER — CEPHALEXIN 500 MG/1
500 CAPSULE ORAL EVERY 8 HOURS SCHEDULED
Qty: 21 CAPSULE | Refills: 0 | Status: SHIPPED | OUTPATIENT
Start: 2022-05-07 | End: 2022-05-14

## 2022-05-07 RX ORDER — KETOROLAC TROMETHAMINE 30 MG/ML
15 INJECTION, SOLUTION INTRAMUSCULAR; INTRAVENOUS EVERY 6 HOURS SCHEDULED
Status: DISCONTINUED | OUTPATIENT
Start: 2022-05-07 | End: 2022-05-07 | Stop reason: HOSPADM

## 2022-05-07 RX ADMIN — AMLODIPINE BESYLATE 10 MG: 10 TABLET ORAL at 08:33

## 2022-05-07 RX ADMIN — ZINC SULFATE 220 MG (50 MG) CAPSULE 220 MG: CAPSULE at 08:33

## 2022-05-07 RX ADMIN — ONDANSETRON 4 MG: 2 INJECTION INTRAMUSCULAR; INTRAVENOUS at 05:43

## 2022-05-07 RX ADMIN — LISINOPRIL 20 MG: 20 TABLET ORAL at 08:33

## 2022-05-07 RX ADMIN — CARVEDILOL 6.25 MG: 6.25 TABLET, FILM COATED ORAL at 08:29

## 2022-05-07 RX ADMIN — DICYCLOMINE HYDROCHLORIDE 20 MG: 10 CAPSULE ORAL at 00:56

## 2022-05-07 RX ADMIN — ASPIRIN 81 MG: 81 TABLET, COATED ORAL at 08:33

## 2022-05-07 RX ADMIN — PIPERACILLIN SODIUM,TAZOBACTAM SODIUM 4.5 G: 4; .5 INJECTION, POWDER, FOR SOLUTION INTRAVENOUS at 05:25

## 2022-05-07 RX ADMIN — KETOROLAC TROMETHAMINE 15 MG: 30 INJECTION, SOLUTION INTRAMUSCULAR at 05:26

## 2022-05-07 RX ADMIN — OMEGA-3 FATTY ACIDS CAP 1000 MG 1000 MG: 1000 CAP at 08:33

## 2022-05-07 NOTE — NURSING NOTE
Patient discharged home  Discharge instruction provided, written instruction provided  Patient questions answered  Patient verbalizes understanding of instruction provided  Patient declined wound care prior to discharge  Patient declined wound care supplies  Patient states he follows up outpatient for wound care  IV removed per hospital protocol, occlusive dressing applied  Patient left unit ambulatory with all personal belongings

## 2022-05-07 NOTE — PLAN OF CARE
Problem: Nutrition/Hydration-ADULT  Goal: Nutrient/Hydration intake appropriate for improving, restoring or maintaining nutritional needs  Description: Monitor and assess patient's nutrition/hydration status for malnutrition  Collaborate with interdisciplinary team and initiate plan and interventions as ordered  Monitor patient's weight and dietary intake as ordered or per policy  Utilize nutrition screening tool and intervene as necessary  Determine patient's food preferences and provide high-protein, high-caloric foods as appropriate       INTERVENTIONS:  - Monitor oral intake, urinary output, labs, and treatment plans  - Assess nutrition and hydration status and recommend course of action  - Evaluate amount of meals eaten  - Assist patient with eating if necessary   - Allow adequate time for meals  - Recommend/ encourage appropriate diets, oral nutritional supplements, and vitamin/mineral supplements  - Order, calculate, and assess calorie counts as needed  - Recommend, monitor, and adjust tube feedings and TPN/PPN based on assessed needs  - Assess need for intravenous fluids  - Provide specific nutrition/hydration education as appropriate  - Include patient/family/caregiver in decisions related to nutrition  5/6/2022 2327 by Opal Shook RN  Outcome: Progressing  5/6/2022 2326 by Opal Shook RN  Outcome: Progressing     Problem: PAIN - ADULT  Goal: Verbalizes/displays adequate comfort level or baseline comfort level  Description: Interventions:  - Encourage patient to monitor pain and request assistance  - Assess pain using appropriate pain scale  - Administer analgesics based on type and severity of pain and evaluate response  - Implement non-pharmacological measures as appropriate and evaluate response  - Consider cultural and social influences on pain and pain management  - Notify physician/advanced practitioner if interventions unsuccessful or patient reports new pain  5/6/2022 2327 by Juan Carlos Montano Oma Matthews, RN  Outcome: Progressing  5/6/2022 2326 by Efe Fry RN  Outcome: Progressing     Problem: METABOLIC, FLUID AND ELECTROLYTES - ADULT  Goal: Electrolytes maintained within normal limits  Description: INTERVENTIONS:  - Monitor labs and assess patient for signs and symptoms of electrolyte imbalances  - Administer electrolyte replacement as ordered  - Monitor response to electrolyte replacements, including repeat lab results as appropriate  - Instruct patient on fluid and nutrition as appropriate  Outcome: Progressing  Goal: Fluid balance maintained  Description: INTERVENTIONS:  - Monitor labs   - Monitor I/O and WT  - Instruct patient on fluid and nutrition as appropriate  - Assess for signs & symptoms of volume excess or deficit  Outcome: Progressing  Goal: Glucose maintained within target range  Description: INTERVENTIONS:  - Monitor Blood Glucose as ordered  - Assess for signs and symptoms of hyperglycemia and hypoglycemia  - Administer ordered medications to maintain glucose within target range  - Assess nutritional intake and initiate nutrition service referral as needed  Outcome: Progressing     Problem: SKIN/TISSUE INTEGRITY - ADULT  Goal: Skin Integrity remains intact(Skin Breakdown Prevention)  Description: Assess:  -Perform Juan assessment every shift  -Clean and moisturize skin every shift  -Inspect skin when repositioning, toileting, and assisting with ADLS  -Assess under medical devices such as alfredo every shift  -Assess extremities for adequate circulation and sensation     Bed Management:  -Have minimal linens on bed & keep smooth, unwrinkled  -Change linens as needed when moist or perspiring  -Avoid sitting or lying in one position for more than 2 hours while in bed  -Keep HOB at 30 degrees     Toileting:  -Offer bedside commode  -Assess for incontinence every shift  -Use incontinent care products after each incontinent episode such as moisture barrier    Activity:  -Mobilize patient 3 times a day  -Encourage activity and walks on unit  -Encourage or provide ROM exercises   -Turn and reposition patient every 2 Hours  -Use appropriate equipment to lift or move patient in bed  -Instruct/ Assist with weight shifting every shift when out of bed in chair  -Consider limitation of chair time 2 hour intervals    Skin Care:  -Avoid use of baby powder, tape, friction and shearing, hot water or constrictive clothing  -Relieve pressure over bony prominences using allevyn  -Do not massage red bony areas    Next Steps:  -Teach patient strategies to minimize risks such as turn and reposition   -Consider consults to  interdisciplinary teams such as wound care  Outcome: Progressing  Goal: Incision(s), wounds(s) or drain site(s) healing without S/S of infection  Description: INTERVENTIONS  - Assess and document dressing, incision, wound bed, drain sites and surrounding tissue  - Provide patient and family education  - Perform skin care/dressing changes every shift  Outcome: Progressing  Goal: Pressure injury heals and does not worsen  Description: Interventions:  - Implement low air loss mattress or specialty surface (Criteria met)  - Apply silicone foam dressing  - Instruct/assist with weight shifting every 30 minutes when in chair   - Limit chair time to 2 hour intervals  - Use special pressure reducing interventions such as waffle cushion when in chair   - Apply fecal or urinary incontinence containment device   - Perform passive or active ROM every shift  - Turn and reposition patient & offload bony prominences every 2 hours   - Utilize friction reducing device or surface for transfers   - Consider consults to  interdisciplinary teams such as wound care  - Use incontinent care products after each incontinent episode such as wipes  - Consider nutrition services referral as needed  Outcome: Progressing     Problem: HEMATOLOGIC - ADULT  Goal: Maintains hematologic stability  Description: INTERVENTIONS  - Assess for signs and symptoms of bleeding or hemorrhage  - Monitor labs  - Administer supportive blood products/factors as ordered and appropriate  Outcome: Progressing

## 2022-05-07 NOTE — PLAN OF CARE
Problem: METABOLIC, FLUID AND ELECTROLYTES - ADULT  Goal: Electrolytes maintained within normal limits  Description: INTERVENTIONS:  - Monitor labs and assess patient for signs and symptoms of electrolyte imbalances  - Administer electrolyte replacement as ordered  - Monitor response to electrolyte replacements, including repeat lab results as appropriate  - Instruct patient on fluid and nutrition as appropriate  Outcome: Progressing  Goal: Fluid balance maintained  Description: INTERVENTIONS:  - Monitor labs   - Monitor I/O and WT  - Instruct patient on fluid and nutrition as appropriate  - Assess for signs & symptoms of volume excess or deficit  Outcome: Progressing  Goal: Glucose maintained within target range  Description: INTERVENTIONS:  - Monitor Blood Glucose as ordered  - Assess for signs and symptoms of hyperglycemia and hypoglycemia  - Administer ordered medications to maintain glucose within target range  - Assess nutritional intake and initiate nutrition service referral as needed  Outcome: Progressing     Problem: SKIN/TISSUE INTEGRITY - ADULT  Goal: Skin Integrity remains intact(Skin Breakdown Prevention)  Description: Assess:  -Perform Juan assessment every shift  -Clean and moisturize skin every shift  -Inspect skin when repositioning, toileting, and assisting with ADLS  -Assess extremities for adequate circulation and sensation     Bed Management:  -Have minimal linens on bed & keep smooth, unwrinkled  -Change linens as needed when moist or perspiring  -Avoid sitting or lying in one position for more than 2 hours while in bed  -Keep HOB at 30 degrees     Toileting:  -Offer bedside commode  -Assess for incontinence every 2 hours    Activity:  -Mobilize patient 2 times a day  -Encourage activity and walks on unit  -Encourage or provide ROM exercises   -Turn and reposition patient every 2 Hours  -Use appropriate equipment to lift or move patient in bed  -Instruct/ Assist with weight shifting every 2 when out of bed in chair  -Consider limitation of chair time 2 hour intervals    Skin Care:  -Avoid use of baby powder, tape, friction and shearing, hot water or constrictive clothing  -Do not massage red bony areas  Outcome: Progressing

## 2022-05-07 NOTE — PROGRESS NOTES
[address] [facility] [address] [facility] Practice Progress Note - Corey Jones 37 y o  male MRN: [id number]  Unit/Bed#: 2 [place] [address] Encounter: [id number]    F: sodium chloride 0 45 % with KCl 20 mEq/L, Last Rate: 125 mL/hr (05/04/22 2244)  E: Replete PRN  N: Diet Markie/CHO Controlled; Consistent Carbohydrate Diet Level 2 (5 carb servings/75 grams CHO/meal)   D: Held due to bleed    Disposition: Continue med-surg level of care   Code Status: Level 1 - Full Code    ASSESSMENT/PLAN:    * Colitis  Assessment & Plan  Presented with 3 day history of abdominal pain associated with nausea vomiting and bloody diarrhea  Found to have leukocytosis (16 K),  CT showing thickening of the left colon indicative of colitis  Given acute onset likely infectious/food-borne illness  He did receive dose of Flagyl in the ED and Toradol/Dilaudid for pain  · Continue Zosyn 3 375 mg  · Stool studies pending (C diff toxin PCR, Giardia antigen, O&P, stool PCR)  · Tylenol for mild pain and Toradol for moderate pain  · Bentyl  · IVFs: Normal saline 125 cc/hr, consider discontinuing as patient is tolerating diet  · Monitor CBC/vitals  · Monitor CMP/electrolyte levels  · Lipase wnl  · Consider GI consult if no improvement    Coronary artery disease  Assessment & Plan  Patient with a history of cardiac catheterization in 2010 and 2020  Stents placed in [facility] in 2020, 2 total  Follows with cardiology outpatient  Denies any chest pain and shortness of breath on admission  Patient states not taking his brilinta   Last saw cards in 09/21 and as that time was on the brilinta, unclear why he stopped the medicine      · Continue home medications  · Carvedilol 6 25 mg, PO, BID  · Aspirin 81 mg, PO, Daily   · F/u outpatient Cardiology to determine the need for continuation of Brilinta      Abscess of multiple sites of buttock  Assessment & Plan  CT on admission showing small rim-enhancing subcutaneous fluid collections are also noted at the level of the upper buttock crease, measuring 3 4 x 2 7 cm on the right and 2 6 x 1 8 cm on the left  Correlate for small abscesses  · Antibiotics as noted  · Consider I&D  · Surgery-chronic issue, I&D is not indicated  · Wound care nurse on board        Sepsis [facility])  Assessment & Plan  Patient had tachycardic, (HR 94), with leukocytosis (16 K)  Source of infection is colitis identified on CT scan  · See plan under colitis      History of hidradenitis suppurativa  Assessment & Plan  Patient has history of hidranitis suppurativa with wounds currently present on left groin and abdominal pannus  · Local wound care  · Antibiotics as noted       Swelling of left lower extremity  Assessment & Plan  Patient has chronic left lower leg lymphedema s/p lymph node removal during the vascular surgery  Denies any calf tenderness on admission  Negative Trista sign  · DVT ppx with SQL and SCD    Nocturnal hypoxia  Assessment & Plan  Satting well on admission on room air  Was found to have nocturnal hypoxia on prior admission in April, 22  Had overnight O2 study during last admission which showed significant hypoxemia with lowest desaturation of 59%  At discharge he was advised to follow-up with pulmonology for outpatient sleep study but he has not done this yet  Was also found to require oxygen with exertion during last admission and was discharged with plan for 2 L with activity    · Oxygen as needed overnight  · Monitor respiratory status   · Encourage f/u outpatient for sleep study    Morbid obesity with body mass index (BMI) of 50 0 to 59 9 in adult [facility])  Assessment & Plan  · Diet/exercise counseling, consider outpatient bariatric consult    Type 2 diabetes mellitus with diabetic polyneuropathy, without long-term current use of insulin [facility])  Assessment & Plan  Lab Results   Component Value Date    HGBA1C 7 5 (H) 03/30/2022       Recent Labs     05/06/22  1104 05/06/22  1557 05/06/22  2300 05/07/22  0717   POCGLU 154* 106 180* 120       Patient A1c noted above, well-controlled  Home meds metformin 1000 mg bid and recently started on victoza on , in titration process, on 1 2 mg currently, received injection this week  · Hold home victoza and metformin  · ISS   · Monitor serum glucose    · Monitor for basal and meal time insuline requirement IP      Essential hypertension  Assessment & Plan  BP's on admission was elevated at 322 systolic but has since stabilized  · Continue home medications  ? Amlodipine 10 mg, PO, Daily   ? Carvedilol 6 25 mg, PO, BID  ? Lisinopril 20 mg, PO, Daily   · Monitor BP       Dyslipidemia  Assessment & Plan  LiPID panel 20: T chol 195, , HDL 29,   · Continue home atorvastatin 80 mg q h s  And fish oil 1000 mg b i d  SUBJECTIVE    Patient seen examined at bedside this morning  Patient states his pain is fine when he is not eating, however when he eats his pain goes up to 8/10  Has some associated nausea with these eating  Otherwise, denies any diarrhea or vomiting  No additional reports of blood stool  Denies chest pain, shortness of breath, headaches, fevers, or chills  OBJECTIVE    Vitals   Vitals:    22 0723 22 1528 22 2342   BP: 105/64 140/74 106/60 153/78   BP Location: Left arm      Pulse: 75 63 64 77   Resp: 20  20 20   Temp: 97 9 °F (36 6 °C) 97 6 °F (36 4 °C) 98 1 °F (36 7 °C) 98 °F (36 7 °C)   TempSrc: Oral   Oral   SpO2:  (!) 89% 96% 92%   Weight:       Height:         Temp (24hrs), Av 9 °F (36 6 °C), Min:97 6 °F (36 4 °C), Max:98 1 °F (36 7 °C)  Current: Temperature: 98 °F (36 7 °C)          Respiratory:  SpO2: SpO2: 92 %, SpO2 Activity: SpO2 Activity: At Rest, SpO2 Device: O2 Device: None (Room air)       Physical Exam  Vitals and nursing note reviewed  Constitutional:       General: He is not in acute distress  Appearance: He is well-developed  He is obese  Cardiovascular:      Rate and Rhythm: Normal rate and regular rhythm        Pulses: Normal pulses  Heart sounds: Normal heart sounds  No murmur heard  Pulmonary:      Effort: Pulmonary effort is normal  No respiratory distress  Breath sounds: Normal breath sounds  Abdominal:      General: Bowel sounds are normal  There is no distension  Palpations: Abdomen is soft  There is no mass  Tenderness: There is no abdominal tenderness (generalized)  There is no guarding or rebound  Comments: Scarring/keloid under the left anterior abdominal pannus  Slight erythema and warmth of the lower abdomen   Musculoskeletal:      Right lower leg: No edema  Left lower leg: Edema (chronic lymphedema) present  Comments: Varicose veins bilaterally   Skin:     General: Skin is warm  Comments: Soft nontender mass about 1 cm in diameter on the left upper buttock  2 Abscess to the left and right of the midline buttock     Neurological:      Mental Status: He is alert     Psychiatric:         Mood and Affect: Mood normal          Behavior: Behavior normal          Laboratory and Diagnostics:  Results from last 7 days   Lab Units 05/07/22  0540 05/06/22  0541 05/05/22  0558 05/04/22  1707   WBC Thousand/uL 9 87 10 73* 12 40* 16 51*   HEMOGLOBIN g/dL 11 7* 11 9* 11 7* 13 2   HEMATOCRIT % 39 5 38 7 39 5 43 4   PLATELETS Thousands/uL 263 281 279 344   NEUTROS PCT % 78* 75 75 85*   MONOS PCT % 5 8 8 6     Results from last 7 days   Lab Units 05/07/22  0540 05/06/22  0541 05/05/22  0558 05/04/22  1707   SODIUM mmol/L 139 136 136 136   POTASSIUM mmol/L 4 5 4 2 3 9 4 0   CHLORIDE mmol/L 103 103 99* 97*   CO2 mmol/L 28 24 30 32   ANION GAP mmol/L 8 9 7 7   BUN mg/dL 15 21 22 16   CREATININE mg/dL 1 07 1 15 1 38* 1 09   CALCIUM mg/dL 8 3 8 0* 8 0* 8 6   GLUCOSE RANDOM mg/dL 126 126 109 137   ALT U/L  --   --  13 16   AST U/L  --   --  13 15   ALK PHOS U/L  --   --  88 99   ALBUMIN g/dL  --   --  2 5* 2 7*   TOTAL BILIRUBIN mg/dL  --   --  0 33 0 35     Results from last 7 days   Lab Units 05/06/22  0541 05/05/22  0558   MAGNESIUM mg/dL 1 8 1 7   PHOSPHORUS mg/dL  --  5 1*               Results from last 7 days   Lab Units 05/04/22  1707   LACTIC ACID mmol/L 0 7     ABG:    VBG:          Micro  Results from last 7 days   Lab Units 05/04/22  2346   MRSA CULTURE ONLY  No Methicillin Resistant Staphlyococcus aureus (MRSA) isolated       Imaging: I have reviewed all pertinent reports  Intake and Output  I/O       05/03 0701 05/04 0700 05/04 0701  05/05 0700 05/05 0701  05/06 0700    IV Piggyback  1000     Total Intake(mL/kg)  1000 (6 1)     Net  +1000                  Height and Weights   Height: 5' 10" (177 8 cm)  IBW (Ideal Body Weight): 73 kg  Body mass index is 52 09 kg/m²  Weight (last 2 days)     None            Nutrition       Diet Orders   (From admission, onward)             Start     Ordered    05/05/22 1117  Room Service  Once        Question:  Type of Service  Answer:  Room Service-Appropriate    05/05/22 1117    05/04/22 2039  Diet Markie/CHO Controlled; Consistent Carbohydrate Diet Level 2 (5 carb servings/75 grams CHO/meal)  Diet effective now        References:    Nutrtion Support Algorithm Enteral vs  Parenteral   Question Answer Comment   Diet Type Markie/CHO Controlled    Markie/CHO Controlled Consistent Carbohydrate Diet Level 2 (5 carb servings/75 grams CHO/meal)    RD to adjust diet per protocol?  Yes        05/04/22 2041                  Active Medications  Scheduled Meds:  Current Facility-Administered Medications   Medication Dose Route Frequency Provider Last Rate    acetaminophen  650 mg Oral Q6H PRN Laz Rodas DO      albuterol  2 puff Inhalation Q6H PRN Laz Rodas,       amLODIPine  10 mg Oral Daily Laz Rodas,       aspirin  81 mg Oral Daily Laz Rodas DO      atorvastatin  80 mg Oral Daily With [employer], DO      carvedilol  6 25 mg Oral BID With Meals Laz Rodas DO      enoxaparin  60 mg Subcutaneous BID Andrew Caballero, DO      fish oil  1,000 mg Oral BID Andrewbrittany Caballero, DO      insulin lispro  2-12 Units Subcutaneous 4x Daily (AC & HS) Lonnie Cantu MD      ketorolac  15 mg Intravenous Q6H [address] Gómez MD      lisinopril  20 mg Oral Daily Andrew Ada, DO      ondansetron  4 mg Intravenous Q6H PRN Andrewbrittany Caballero DO      piperacillin-tazobactam  4 5 g Intravenous Q6H Lonnie Cantu MD 4 5 g (05/07/22 0525)    sodium chloride  125 mL/hr Intravenous Continuous Lonnie Cantu  mL/hr (05/06/22 1717)    zinc sulfate  220 mg Oral Daily Andrew Caballero DO       Continuous Infusions:  sodium chloride, 125 mL/hr, Last Rate: 125 mL/hr (05/06/22 1717)      PRN Meds:   acetaminophen, 650 mg, Q6H PRN  albuterol, 2 puff, Q6H PRN  ondansetron, 4 mg, Q6H PRN        Invasive Devices Review  Invasive Devices  Report    Peripheral Intravenous Line            Peripheral IV 05/04/22 Right Antecubital 2 days                Ashanti Leggett MD      Portions of the record may have been created with voice recognition software  Occasional wrong word or "sound a like" substitutions may have occurred due to the inherent limitations of voice recognition software    Read the chart carefully and recognize, using context, where substitutions have occurred

## 2022-05-09 ENCOUNTER — TRANSITIONAL CARE MANAGEMENT (OUTPATIENT)
Dept: FAMILY MEDICINE CLINIC | Facility: CLINIC | Age: 42
End: 2022-05-09

## 2022-05-09 ENCOUNTER — TELEPHONE (OUTPATIENT)
Dept: FAMILY MEDICINE CLINIC | Facility: CLINIC | Age: 42
End: 2022-05-09

## 2022-05-09 NOTE — UTILIZATION REVIEW
Notification of Discharge   This is a Notification of Discharge from our facility [address]  Please be advised that this patient has been discharge from our facility  Below you will find the admission and discharge date and time including the patients disposition  UTILIZATION REVIEW CONTACT:  Lashaun Gaston  Utilization   Network Utilization Review Department  Phone: [phone] x carefully listen to the prompts  All voicemails are confidential   Email: [email or web address]     PHYSICIAN ADVISORY SERVICES:  FOR VCQY-LO-CRUR REVIEW - MEDICAL NECESSITY DENIAL  Phone: [phone]  Fax: [phone]  Email: [email or web address]     PRESENTATION DATE: 5/4/2022  4:17 PM  OBERVATION ADMISSION DATE:   INPATIENT ADMISSION DATE: 5/6/22  2:39 PM   DISCHARGE DATE: 5/7/2022  1:30 PM  DISPOSITION: Home/Self Care Home/Self Care      IMPORTANT INFORMATION:  Send all requests for admission clinical reviews, approved or denied determinations and any other requests to dedicated fax number below belonging to the campus where the patient is receiving treatment   List of dedicated fax numbers:  [address] DENIALS (Administrative/Medical Necessity) [phone]   [address] (Maternity/NICU/Pediatrics) [phone]   [facility] [phone]   130 [facility] [phone]   [address] [place] [address] [phone]   [address] [phone]   [facility]  [phone]   [address] [place] [address], [address] W  2401 [facility] 1000 [address] [facility] [phone]

## 2022-05-09 NOTE — TELEPHONE ENCOUNTER
Fax received from [employer]  Copy scanned into encounter  Placed in white team folder at nurse station

## 2022-05-09 NOTE — UTILIZATION REVIEW
Continued Stay Review    Date: 5-6-22                           OBSERVATION 5-4-22 CHANGED TO INPATIENT 5-6-22 FOR CONTINUED TREATMENT OF COLITIS WITH IV ANTIBIOTICS      05/06/22 1439  Inpatient Admission  Once        Transfer Service: General Medicine       Question Answer   Level of Care Med Surg   Estimated length of stay More than 2 Midnights   Certification I certify that inpatient services are medically necessary for this patient for a duration of greater than two midnights  See H&P and MD Progress Notes for additional information about the patient's course of treatment  05/04/22 1931  Place in Observation  Once        Transfer Service: General Medicine       Question: Level of Care Answer: Med Surg            HPI:41 y o  male initially admitted on 5-4-22    Assessment/Plan:     Wound care assessment significant for multiple abscess sites of sacro buttock area and abdominal area  Surgical wound on mid thigh  Continue iv zosyn and iv  9% ns 125/hr for colitis  Consult gastroenterology if not improving  Bloody bowel movement this am           Vital Signs:     Date/Time Temp Pulse Resp BP MAP (mmHg) O2 Device   05/06/22 0729 -- -- -- -- -- None (Room air)   05/06/22 07:23:34 97 9 °F (36 6 °C) 75 20 105/64 78                  Pertinent Labs/Diagnostic Results:       Results from last 7 days   Lab Units 05/04/22  1658   SARS-COV-2  Negative     Results from last 7 days   Lab Units 05/07/22  0540 05/06/22  0541 05/05/22  0558 05/04/22  1707 05/04/22  1707   WBC Thousand/uL 9 87 10 73* 12 40*   < > 16 51*   HEMOGLOBIN g/dL 11 7* 11 9* 11 7*  --  13 2   HEMATOCRIT % 39 5 38 7 39 5  --  43 4   PLATELETS Thousands/uL 263 281 279   < > 344   NEUTROS ABS Thousands/µL 7 67* 8 08* 9 38*   < > 13 93*    < > = values in this interval not displayed           Results from last 7 days   Lab Units 05/07/22  0540 05/06/22  0541 05/05/22  0558 05/04/22  1707   SODIUM mmol/L 139 136 136 136   POTASSIUM mmol/L 4 5 4 2 3 9 4  0   CHLORIDE mmol/L 103 103 99* 97*   CO2 mmol/L 28 24 30 32   ANION GAP mmol/L 8 9 7 7   BUN mg/dL 15 21 22 16   CREATININE mg/dL 1 07 1 15 1 38* 1 09   EGFR ml/min/1 73sq m 86 79 63 84   CALCIUM mg/dL 8 3 8 0* 8 0* 8 6   MAGNESIUM mg/dL  --  1 8 1 7  --    PHOSPHORUS mg/dL  --   --  5 1*  --      Results from last 7 days   Lab Units 05/05/22  0558 05/04/22  1707   AST U/L 13 15   ALT U/L 13 16   ALK PHOS U/L 88 99   TOTAL PROTEIN g/dL 7 5 8 2   ALBUMIN g/dL 2 5* 2 7*   TOTAL BILIRUBIN mg/dL 0 33 0 35     Results from last 7 days   Lab Units 05/07/22  1112 05/07/22  0717 05/06/22  2300 05/06/22  1557 05/06/22  1104 05/06/22  0720 05/05/22  2111 05/05/22  1600 05/05/22  1059 05/05/22  0719 05/04/22  2326   POC GLUCOSE mg/dl 131 120 180* 106 154* 136 149* 112 151* 96 212*     Results from last 7 days   Lab Units 05/07/22  0540 05/06/22  0541 05/05/22  0558 05/04/22  1707   GLUCOSE RANDOM mg/dL 126 126 109 137       Results from last 7 days   Lab Units 05/04/22  1707   LACTIC ACID mmol/L 0 7       Results from last 7 days   Lab Units 05/05/22  0558 05/04/22  1707   LIPASE u/L 102 81       Results from last 7 days   Lab Units 05/04/22  1658   INFLUENZA A PCR  Negative   INFLUENZA B PCR  Negative   RSV PCR  Negative       Scheduled Medications:    lidocaine, 5 mL, Topical, Once      Continuous IV Infusions:     PRN Meds:       Discharge Plan: to be determined     Network Utilization Review Department  ATTENTION: Please call with any questions or concerns to [phone] and carefully listen to the prompts so that you are directed to the right person  All voicemails are confidential   Sal Garcia all requests for admission clinical reviews, approved or denied determinations and any other requests to dedicated fax number below belonging to the campus where the patient is receiving treatment   List of dedicated fax numbers for the Facilities:  FACILITY NAME UR FAX NUMBER   ADMISSION DENIALS (Administrative/Medical Necessity) 7602 [facility] (Maternity/NICU/Pediatrics) [address] [place] [address],[address] Floor [facility] [address] [place] [address]  [phone]   [address] [address] [address] [phone]   [address] [phone]

## 2022-05-10 ENCOUNTER — TELEPHONE (OUTPATIENT)
Dept: FAMILY MEDICINE CLINIC | Facility: CLINIC | Age: 42
End: 2022-05-10

## 2022-05-10 NOTE — TELEPHONE ENCOUNTER
Discharge Planner OT   Patient plan for discharge: Rehab  Current status: Pt performs bed mobility with CGA and use of bed rail, sit to stand with min A and walker and  transfer to/from bed and w/c with min A. Back to bed with min A for LEs. Max A for LB dressing. Completed seated g/h tasks with set up and min A.  Barriers to return to prior living situation: Medical Status, HR, Activity Tolerance, Strength  Recommendations for discharge: Per plan established by the OT, the recommendation for dc location is TCU  Rationale for recommendations: Patient would benefit from ongoing therapies to progress ADL/Mobility independence and safety. Ability to progress activity with therapies limited by significantly elevated HR's.         Pt called and stated someone called him regarding his lab results  Unable to find an encounter regarding this  I informed pt of his TCM appt on Thursday, pt stated he is working and unable to make it to that appt  Pt requested appt later in the day  Unsure of the TCM timeframe (7or 14 days) to reschedule that appt  Pt stated he is available for someone to return his call regarding lab results or possibly TCM call       [phone]

## 2022-05-18 ENCOUNTER — PATIENT OUTREACH (OUTPATIENT)
Dept: FAMILY MEDICINE CLINIC | Facility: CLINIC | Age: 42
End: 2022-05-18

## 2022-05-18 NOTE — PROGRESS NOTES
Chart review done after receiving in basket message requesting follow up  LVM for patient  Pt does have appointment scheduled at clinic on 6/2 at 4 pm  Reminded patient of this appointment  Requested return call if any immediate questions or concerns

## 2022-06-13 ENCOUNTER — HOSPITAL ENCOUNTER (INPATIENT)
Facility: HOSPITAL | Age: 42
LOS: 1 days | Discharge: HOME | DRG: 683 | End: 2022-06-15
Attending: EMERGENCY MEDICINE | Admitting: STUDENT IN AN ORGANIZED HEALTH CARE EDUCATION/TRAINING PROGRAM
Payer: COMMERCIAL

## 2022-06-13 ENCOUNTER — APPOINTMENT (EMERGENCY)
Dept: RADIOLOGY | Facility: HOSPITAL | Age: 42
DRG: 683 | End: 2022-06-13
Attending: EMERGENCY MEDICINE
Payer: COMMERCIAL

## 2022-06-13 DIAGNOSIS — I89.0 LYMPHEDEMA OF BOTH LOWER EXTREMITIES: ICD-10-CM

## 2022-06-13 DIAGNOSIS — L03.90 CELLULITIS: Primary | ICD-10-CM

## 2022-06-13 DIAGNOSIS — N17.9 AKI (ACUTE KIDNEY INJURY) (HCC): ICD-10-CM

## 2022-06-13 PROBLEM — Z95.5 HISTORY OF CORONARY ANGIOPLASTY WITH INSERTION OF STENT: Status: ACTIVE | Noted: 2022-06-13

## 2022-06-13 PROBLEM — D64.9 ANEMIA: Status: ACTIVE | Noted: 2022-04-01

## 2022-06-13 LAB
ALBUMIN SERPL BCP-MCNC: 2.5 G/DL (ref 3.5–5)
ALP SERPL-CCNC: 98 U/L (ref 46–116)
ALT SERPL W P-5'-P-CCNC: 18 U/L (ref 12–78)
ANION GAP SERPL CALCULATED.3IONS-SCNC: 10 MMOL/L (ref 4–13)
APTT PPP: 38 SECONDS (ref 23–37)
AST SERPL W P-5'-P-CCNC: 21 U/L (ref 5–45)
BASOPHILS # BLD AUTO: 0.02 THOUSANDS/ΜL (ref 0–0.1)
BASOPHILS NFR BLD AUTO: 0 % (ref 0–1)
BILIRUB SERPL-MCNC: 0.4 MG/DL (ref 0.2–1)
BUN SERPL-MCNC: 18 MG/DL (ref 5–25)
CALCIUM ALBUM COR SERPL-MCNC: 9.6 MG/DL (ref 8.3–10.1)
CALCIUM SERPL-MCNC: 8.4 MG/DL (ref 8.3–10.1)
CHLORIDE SERPL-SCNC: 99 MMOL/L (ref 100–108)
CO2 SERPL-SCNC: 27 MMOL/L (ref 21–32)
CREAT SERPL-MCNC: 1.41 MG/DL (ref 0.6–1.3)
CRP SERPL QL: 404.1 MG/L
EOSINOPHIL # BLD AUTO: 0.03 THOUSAND/ΜL (ref 0–0.61)
EOSINOPHIL NFR BLD AUTO: 0 % (ref 0–6)
ERYTHROCYTE [DISTWIDTH] IN BLOOD BY AUTOMATED COUNT: 17.3 % (ref 11.6–15.1)
FLUAV RNA RESP QL NAA+PROBE: NEGATIVE
FLUBV RNA RESP QL NAA+PROBE: NEGATIVE
GFR SERPL CREATININE-BSD FRML MDRD: 61 ML/MIN/1.73SQ M
GLUCOSE SERPL-MCNC: 120 MG/DL (ref 65–140)
GLUCOSE SERPL-MCNC: 131 MG/DL (ref 65–140)
GLUCOSE SERPL-MCNC: 94 MG/DL (ref 65–140)
HCT VFR BLD AUTO: 36.8 % (ref 36.5–49.3)
HGB BLD-MCNC: 11.7 G/DL (ref 12–17)
IMM GRANULOCYTES # BLD AUTO: 0.06 THOUSAND/UL (ref 0–0.2)
IMM GRANULOCYTES NFR BLD AUTO: 1 % (ref 0–2)
INR PPP: 1.12 (ref 0.84–1.19)
LACTATE SERPL-SCNC: 0.9 MMOL/L (ref 0.5–2)
LYMPHOCYTES # BLD AUTO: 0.86 THOUSANDS/ΜL (ref 0.6–4.47)
LYMPHOCYTES NFR BLD AUTO: 10 % (ref 14–44)
MAGNESIUM SERPL-MCNC: 1.6 MG/DL (ref 1.6–2.6)
MCH RBC QN AUTO: 26 PG (ref 26.8–34.3)
MCHC RBC AUTO-ENTMCNC: 31.8 G/DL (ref 31.4–37.4)
MCV RBC AUTO: 82 FL (ref 82–98)
MONOCYTES # BLD AUTO: 0.63 THOUSAND/ΜL (ref 0.17–1.22)
MONOCYTES NFR BLD AUTO: 8 % (ref 4–12)
NEUTROPHILS # BLD AUTO: 6.66 THOUSANDS/ΜL (ref 1.85–7.62)
NEUTS SEG NFR BLD AUTO: 81 % (ref 43–75)
NRBC BLD AUTO-RTO: 0 /100 WBCS
PLATELET # BLD AUTO: 274 THOUSANDS/UL (ref 149–390)
PMV BLD AUTO: 9.7 FL (ref 8.9–12.7)
POTASSIUM SERPL-SCNC: 3.8 MMOL/L (ref 3.5–5.3)
PROT SERPL-MCNC: 7.9 G/DL (ref 6.4–8.2)
PROTHROMBIN TIME: 14.2 SECONDS (ref 11.6–14.5)
RBC # BLD AUTO: 4.5 MILLION/UL (ref 3.88–5.62)
RSV RNA RESP QL NAA+PROBE: NEGATIVE
SARS-COV-2 RNA RESP QL NAA+PROBE: NEGATIVE
SODIUM SERPL-SCNC: 136 MMOL/L (ref 136–145)
WBC # BLD AUTO: 8.26 THOUSAND/UL (ref 4.31–10.16)

## 2022-06-13 PROCEDURE — 99285 EMERGENCY DEPT VISIT HI MDM: CPT

## 2022-06-13 PROCEDURE — 83605 ASSAY OF LACTIC ACID: CPT | Performed by: EMERGENCY MEDICINE

## 2022-06-13 PROCEDURE — 96375 TX/PRO/DX INJ NEW DRUG ADDON: CPT

## 2022-06-13 PROCEDURE — 96361 HYDRATE IV INFUSION ADD-ON: CPT

## 2022-06-13 PROCEDURE — 82948 REAGENT STRIP/BLOOD GLUCOSE: CPT

## 2022-06-13 PROCEDURE — 85610 PROTHROMBIN TIME: CPT | Performed by: EMERGENCY MEDICINE

## 2022-06-13 PROCEDURE — 36415 COLL VENOUS BLD VENIPUNCTURE: CPT | Performed by: EMERGENCY MEDICINE

## 2022-06-13 PROCEDURE — 86140 C-REACTIVE PROTEIN: CPT | Performed by: EMERGENCY MEDICINE

## 2022-06-13 PROCEDURE — 83735 ASSAY OF MAGNESIUM: CPT | Performed by: EMERGENCY MEDICINE

## 2022-06-13 PROCEDURE — 96365 THER/PROPH/DIAG IV INF INIT: CPT

## 2022-06-13 PROCEDURE — 87040 BLOOD CULTURE FOR BACTERIA: CPT | Performed by: EMERGENCY MEDICINE

## 2022-06-13 PROCEDURE — 93971 EXTREMITY STUDY: CPT

## 2022-06-13 PROCEDURE — 0241U HB NFCT DS VIR RESP RNA 4 TRGT: CPT | Performed by: EMERGENCY MEDICINE

## 2022-06-13 PROCEDURE — 85025 COMPLETE CBC W/AUTO DIFF WBC: CPT | Performed by: EMERGENCY MEDICINE

## 2022-06-13 PROCEDURE — 99285 EMERGENCY DEPT VISIT HI MDM: CPT | Performed by: EMERGENCY MEDICINE

## 2022-06-13 PROCEDURE — 80053 COMPREHEN METABOLIC PANEL: CPT | Performed by: EMERGENCY MEDICINE

## 2022-06-13 PROCEDURE — 85730 THROMBOPLASTIN TIME PARTIAL: CPT | Performed by: EMERGENCY MEDICINE

## 2022-06-13 RX ORDER — HYDROMORPHONE HCL/PF 1 MG/ML
0.1 SYRINGE (ML) INJECTION EVERY 6 HOURS PRN
Status: DISCONTINUED | OUTPATIENT
Start: 2022-06-13 | End: 2022-06-13

## 2022-06-13 RX ORDER — HYDROMORPHONE HCL/PF 1 MG/ML
0.5 SYRINGE (ML) INJECTION EVERY 6 HOURS PRN
Status: DISCONTINUED | OUTPATIENT
Start: 2022-06-13 | End: 2022-06-13

## 2022-06-13 RX ORDER — ONDANSETRON 2 MG/ML
4 INJECTION INTRAMUSCULAR; INTRAVENOUS ONCE
Status: COMPLETED | OUTPATIENT
Start: 2022-06-13 | End: 2022-06-13

## 2022-06-13 RX ORDER — HYDROMORPHONE HCL/PF 1 MG/ML
0.5 SYRINGE (ML) INJECTION EVERY 6 HOURS PRN
Status: DISCONTINUED | OUTPATIENT
Start: 2022-06-13 | End: 2022-06-14

## 2022-06-13 RX ORDER — SODIUM CHLORIDE 9 MG/ML
125 INJECTION, SOLUTION INTRAVENOUS CONTINUOUS
Status: DISPENSED | OUTPATIENT
Start: 2022-06-13 | End: 2022-06-14

## 2022-06-13 RX ORDER — KETOROLAC TROMETHAMINE 30 MG/ML
15 INJECTION, SOLUTION INTRAMUSCULAR; INTRAVENOUS ONCE
Status: COMPLETED | OUTPATIENT
Start: 2022-06-13 | End: 2022-06-13

## 2022-06-13 RX ORDER — ASPIRIN 81 MG/1
81 TABLET ORAL DAILY
Status: DISCONTINUED | OUTPATIENT
Start: 2022-06-14 | End: 2022-06-15 | Stop reason: HOSPADM

## 2022-06-13 RX ORDER — CHLORAL HYDRATE 500 MG
1000 CAPSULE ORAL 2 TIMES DAILY
Status: DISCONTINUED | OUTPATIENT
Start: 2022-06-13 | End: 2022-06-15 | Stop reason: HOSPADM

## 2022-06-13 RX ORDER — INSULIN LISPRO 100 [IU]/ML
2-12 INJECTION, SOLUTION INTRAVENOUS; SUBCUTANEOUS
Status: DISCONTINUED | OUTPATIENT
Start: 2022-06-13 | End: 2022-06-15 | Stop reason: HOSPADM

## 2022-06-13 RX ORDER — ENOXAPARIN SODIUM 100 MG/ML
40 INJECTION SUBCUTANEOUS DAILY
Status: DISCONTINUED | OUTPATIENT
Start: 2022-06-14 | End: 2022-06-15 | Stop reason: HOSPADM

## 2022-06-13 RX ORDER — ALBUTEROL SULFATE 90 UG/1
2 AEROSOL, METERED RESPIRATORY (INHALATION) EVERY 6 HOURS PRN
Status: DISCONTINUED | OUTPATIENT
Start: 2022-06-13 | End: 2022-06-15 | Stop reason: HOSPADM

## 2022-06-13 RX ORDER — OXYCODONE HCL 10 MG/1
10 TABLET, FILM COATED, EXTENDED RELEASE ORAL EVERY 12 HOURS SCHEDULED
Status: DISCONTINUED | OUTPATIENT
Start: 2022-06-14 | End: 2022-06-13

## 2022-06-13 RX ORDER — HYDROMORPHONE HCL/PF 1 MG/ML
0.2 SYRINGE (ML) INJECTION EVERY 6 HOURS PRN
Status: DISCONTINUED | OUTPATIENT
Start: 2022-06-13 | End: 2022-06-13

## 2022-06-13 RX ORDER — CLINDAMYCIN PHOSPHATE 600 MG/50ML
600 INJECTION INTRAVENOUS ONCE
Status: COMPLETED | OUTPATIENT
Start: 2022-06-13 | End: 2022-06-13

## 2022-06-13 RX ORDER — MORPHINE SULFATE 4 MG/ML
4 INJECTION, SOLUTION INTRAMUSCULAR; INTRAVENOUS ONCE
Status: COMPLETED | OUTPATIENT
Start: 2022-06-13 | End: 2022-06-13

## 2022-06-13 RX ORDER — ACETAMINOPHEN 325 MG/1
650 TABLET ORAL EVERY 6 HOURS PRN
Status: DISCONTINUED | OUTPATIENT
Start: 2022-06-13 | End: 2022-06-15 | Stop reason: HOSPADM

## 2022-06-13 RX ORDER — OXYCODONE HCL 10 MG/1
10 TABLET, FILM COATED, EXTENDED RELEASE ORAL EVERY 12 HOURS SCHEDULED
Status: DISCONTINUED | OUTPATIENT
Start: 2022-06-13 | End: 2022-06-13

## 2022-06-13 RX ORDER — OXYCODONE HYDROCHLORIDE AND ACETAMINOPHEN 5; 325 MG/1; MG/1
1 TABLET ORAL ONCE
Status: COMPLETED | OUTPATIENT
Start: 2022-06-13 | End: 2022-06-13

## 2022-06-13 RX ORDER — ZINC SULFATE 50(220)MG
220 CAPSULE ORAL DAILY
Status: DISCONTINUED | OUTPATIENT
Start: 2022-06-14 | End: 2022-06-15 | Stop reason: HOSPADM

## 2022-06-13 RX ORDER — SODIUM CHLORIDE 9 MG/ML
125 INJECTION, SOLUTION INTRAVENOUS CONTINUOUS
Status: DISCONTINUED | OUTPATIENT
Start: 2022-06-13 | End: 2022-06-13

## 2022-06-13 RX ORDER — CEFAZOLIN SODIUM 2 G/50ML
2000 SOLUTION INTRAVENOUS EVERY 8 HOURS
Status: DISCONTINUED | OUTPATIENT
Start: 2022-06-13 | End: 2022-06-15 | Stop reason: HOSPADM

## 2022-06-13 RX ORDER — AMLODIPINE BESYLATE 10 MG/1
10 TABLET ORAL DAILY
Status: DISCONTINUED | OUTPATIENT
Start: 2022-06-14 | End: 2022-06-15 | Stop reason: HOSPADM

## 2022-06-13 RX ORDER — CARVEDILOL 6.25 MG/1
6.25 TABLET ORAL 2 TIMES DAILY WITH MEALS
Status: DISCONTINUED | OUTPATIENT
Start: 2022-06-13 | End: 2022-06-15 | Stop reason: HOSPADM

## 2022-06-13 RX ORDER — ATORVASTATIN CALCIUM 80 MG/1
80 TABLET, FILM COATED ORAL
Status: DISCONTINUED | OUTPATIENT
Start: 2022-06-13 | End: 2022-06-15 | Stop reason: HOSPADM

## 2022-06-13 RX ADMIN — HYDROMORPHONE HYDROCHLORIDE 0.1 MG: 1 INJECTION, SOLUTION INTRAMUSCULAR; INTRAVENOUS; SUBCUTANEOUS at 17:35

## 2022-06-13 RX ADMIN — ONDANSETRON 4 MG: 2 INJECTION INTRAMUSCULAR; INTRAVENOUS at 12:00

## 2022-06-13 RX ADMIN — MORPHINE SULFATE 4 MG: 4 INJECTION INTRAVENOUS at 13:02

## 2022-06-13 RX ADMIN — CEFAZOLIN SODIUM 2000 MG: 2 SOLUTION INTRAVENOUS at 20:54

## 2022-06-13 RX ADMIN — ATORVASTATIN CALCIUM 80 MG: 80 TABLET, FILM COATED ORAL at 17:28

## 2022-06-13 RX ADMIN — SODIUM CHLORIDE 125 ML/HR: 0.9 INJECTION, SOLUTION INTRAVENOUS at 17:28

## 2022-06-13 RX ADMIN — CLINDAMYCIN PHOSPHATE 600 MG: 600 INJECTION, SOLUTION INTRAVENOUS at 12:12

## 2022-06-13 RX ADMIN — CARVEDILOL 6.25 MG: 6.25 TABLET, FILM COATED ORAL at 17:28

## 2022-06-13 RX ADMIN — OXYCODONE HYDROCHLORIDE AND ACETAMINOPHEN 1 TABLET: 5; 325 TABLET ORAL at 14:19

## 2022-06-13 RX ADMIN — SODIUM CHLORIDE 1000 ML: 0.9 INJECTION, SOLUTION INTRAVENOUS at 13:02

## 2022-06-13 RX ADMIN — OMEGA-3 FATTY ACIDS CAP 1000 MG 1000 MG: 1000 CAP at 17:28

## 2022-06-13 RX ADMIN — HYDROMORPHONE HYDROCHLORIDE 0.5 MG: 1 INJECTION, SOLUTION INTRAMUSCULAR; INTRAVENOUS; SUBCUTANEOUS at 22:26

## 2022-06-13 RX ADMIN — KETOROLAC TROMETHAMINE 15 MG: 30 INJECTION, SOLUTION INTRAMUSCULAR at 12:00

## 2022-06-13 NOTE — LETTER
[address] [facility] [address]  [place] [address]  Dept: [phone]    Tori 15, 2022     Patient: Obdulia Swift   YOB: 1980   Date of Visit: 6/13/2022       To Whom it May Concern:    Poornima Marshall is under my professional care  He was seen in the hospital from 6/13/2022   to 06/15/22  He may return to work on 6/22/2022, or earlier if symptoms improve, without limitations  If you have any questions or concerns, please don't hesitate to call           Sincerely,          Jerri Bocanegra MD

## 2022-06-13 NOTE — ASSESSMENT & PLAN NOTE
H/o cardiac catheterization in 2010 and 2020  Stents placed in [facility] in 2020, 2 total   Stable   Continue on ASA and statin

## 2022-06-13 NOTE — PLAN OF CARE
Problem: Potential for Falls  Goal: Patient will remain free of falls  Description: INTERVENTIONS:  - Educate patient/family on patient safety including physical limitations  - Instruct patient to call for assistance with activity   - Consult OT/PT to assist with strengthening/mobility   - Keep Call bell within reach  - Keep bed low and locked with side rails adjusted as appropriate  - Keep care items and personal belongings within reach  - Initiate and maintain comfort rounds  - Make Fall Risk Sign visible to staff  - Offer Toileting every 2 Hours, in advance of need  - Initiate/Maintain 1alarm  - Obtain necessary fall risk management equipment: call bell   - Apply yellow socks and bracelet for high fall risk patients  - Consider moving patient to room near nurses station  Outcome: Progressing     Problem: PAIN - ADULT  Goal: Verbalizes/displays adequate comfort level or baseline comfort level  Description: Interventions:  - Encourage patient to monitor pain and request assistance  - Assess pain using appropriate pain scale  - Administer analgesics based on type and severity of pain and evaluate response  - Implement non-pharmacological measures as appropriate and evaluate response  - Consider cultural and social influences on pain and pain management  - Notify physician/advanced practitioner if interventions unsuccessful or patient reports new pain  Outcome: Progressing     Problem: INFECTION - ADULT  Goal: Absence or prevention of progression during hospitalization  Description: INTERVENTIONS:  - Assess and monitor for signs and symptoms of infection  - Monitor lab/diagnostic results  - Monitor all insertion sites, i e  indwelling lines, tubes, and drains  - Irvona appropriate cooling/warming therapies per order  - Administer medications as ordered  - Instruct and encourage patient and family to use good hand hygiene technique  - Identify and instruct in appropriate isolation precautions for identified infection/condition  Outcome: Progressing  Goal: Absence of fever/infection during neutropenic period  Description: INTERVENTIONS:  - Monitor WBC    Outcome: Progressing     Problem: SAFETY ADULT  Goal: Patient will remain free of falls  Description: INTERVENTIONS:  - Educate patient/family on patient safety including physical limitations  - Instruct patient to call for assistance with activity   - Consult OT/PT to assist with strengthening/mobility   - Keep Call bell within reach  - Keep bed low and locked with side rails adjusted as appropriate  - Keep care items and personal belongings within reach  - Initiate and maintain comfort rounds  - Make Fall Risk Sign visible to staff  - Offer Toileting every 2 Hours, in advance of need  - Initiate/Maintain 1alarm  - Obtain necessary fall risk management equipment: call bell  - Apply yellow socks and bracelet for high fall risk patients  - Consider moving patient to room near nurses station  Outcome: Progressing  Goal: Maintain or return to baseline ADL function  Description: INTERVENTIONS:  -  Assess patient's ability to carry out ADLs; assess patient's baseline for ADL function and identify physical deficits which impact ability to perform ADLs (bathing, care of mouth/teeth, toileting, grooming, dressing, etc )  - Assess/evaluate cause of self-care deficits   - Assess range of motion  - Assess patient's mobility; develop plan if impaired  - Assess patient's need for assistive devices and provide as appropriate  - Encourage maximum independence but intervene and supervise when necessary  - Involve family in performance of ADLs  - Assess for home care needs following discharge   - Consider OT consult to assist with ADL evaluation and planning for discharge  - Provide patient education as appropriate  Outcome: Progressing  Goal: Maintains/Returns to pre admission functional level  Description: INTERVENTIONS:  - Perform BMAT or MOVE assessment daily    - Set and communicate daily mobility goal to care team and patient/family/caregiver  - Collaborate with rehabilitation services on mobility goals if consulted  - Perform Range of Motion 3 times a day  - Reposition patient every 3 hours  - Dangle patient 3 times a day  - Stand patient 3 times a day  - Ambulate patient 3 times a day  - Out of bed to chair 3 times a day   - Out of bed for meals 3 times a day  - Out of bed for toileting  - Record patient progress and toleration of activity level   Outcome: Progressing     Problem: DISCHARGE PLANNING  Goal: Discharge to home or other facility with appropriate resources  Description: INTERVENTIONS:  - Identify barriers to discharge w/patient and caregiver  - Arrange for needed discharge resources and transportation as appropriate  - Identify discharge learning needs (meds, wound care, etc )  - Arrange for interpretive services to assist at discharge as needed  - Refer to Case Management Department for coordinating discharge planning if the patient needs post-hospital services based on physician/advanced practitioner order or complex needs related to functional status, cognitive ability, or social support system  Outcome: Progressing     Problem: Knowledge Deficit  Goal: Patient/family/caregiver demonstrates understanding of disease process, treatment plan, medications, and discharge instructions  Description: Complete learning assessment and assess knowledge base    Interventions:  - Provide teaching at level of understanding  - Provide teaching via preferred learning methods  Outcome: Progressing

## 2022-06-13 NOTE — ASSESSMENT & PLAN NOTE
Lab Results   Component Value Date    HGBA1C 7 5 (H) 03/30/2022       Recent Labs     06/14/22  1134 06/14/22  1607 06/14/22  2136 06/15/22  0703   POCGLU 157* 126 123 144*       Blood Sugar Average: Last 72 hrs:  (P) 758 [id number]   Hold home Victoza metformin  ISS alg 4, and monitor serum glucose

## 2022-06-13 NOTE — ED PROVIDER NOTES
History  Chief Complaint   Patient presents with    Leg Swelling     Left calf pain and swelling  Red, swollen, rash  Redness streaking into inner left thigh  Hx left groin tumor where left calf is usually swollen but not this size    Weakness - Generalized     Weakness, n/v Friday, loss of appetite, generalized "sick not feeling right" dizziness ongoing since Friday with all symptoms getting worse over weekend     Patient with a history of chronic left lower extremity lymphedema, status post thigh mass resection, presents with complaint increasing redness and swelling to left lower extremity  Patient also complains of generalized weakness, malaise, dizziness  He denies fevers or chills  Has a history of diabetes, hypertension, dyslipidemia, CAD, morbid obesity, diabetic neuropathy  History provided by:  Patient   used: No        Prior to Admission Medications   Prescriptions Last Dose Informant Patient Reported? Taking?    Insulin Pen Needle (PEN NEEDLES 29GX1/2") 29G X 12MM MISC   No No   Sig: Use 1 needle daily for subcutaneous injections   Patient not taking: Reported on 5/4/2022    Omega-3 Fatty Acids (fish oil) 1,000 mg 6/12/2022 at Unknown time  No Yes   Sig: Take 1 capsule (1,000 mg total) by mouth 2 (two) times a day   albuterol (ProAir HFA) 90 mcg/act inhaler Unknown at Unknown time  No No   Sig: Inhale 2 puffs every 6 (six) hours as needed for wheezing   amLODIPine (NORVASC) 10 mg tablet 6/12/2022 at Unknown time  No Yes   Sig: Take 1 tablet (10 mg total) by mouth daily   aspirin (ECOTRIN LOW STRENGTH) 81 mg EC tablet 6/12/2022 at Unknown time  No Yes   Sig: Take 1 tablet (81 mg total) by mouth daily   atorvastatin (LIPITOR) 80 mg tablet 6/12/2022 at Unknown time  No Yes   Sig: Take 1 tablet (80 mg total) by mouth daily   carvedilol (COREG) 6 25 mg tablet 6/12/2022 at Unknown time  No Yes   Sig: Take 1 tablet (6 25 mg total) by mouth 2 (two) times a day with meals   ibuprofen (MOTRIN) 600 mg tablet Unknown at Unknown time  No No   Sig: Take 1 tablet (600 mg total) by mouth every 6 (six) hours as needed for mild pain or moderate pain   liraglutide (VICTOZA) injection More than a month at Unknown time  No No   Sig: Inject 0 1 mL (0 6 mg total) under the skin daily Start at 0 6 mg daily for a week, then increase to 1 2 mg daily on the second week, then increase to 1 8 mg daily on the third week and going forward     lisinopril (ZESTRIL) 20 mg tablet 6/12/2022 at Unknown time  No Yes   Sig: Take 1 tablet (20 mg total) by mouth daily   metFORMIN (GLUCOPHAGE) 1000 MG tablet 6/12/2022 at Unknown time  No Yes   Sig: Take 1 tablet (1,000 mg total) by mouth 2 (two) times a day with meals   ticagrelor (Brilinta) 90 MG   No No   Sig: Take 1 tablet (90 mg total) by mouth every 12 (twelve) hours   Patient not taking: Reported on 5/2/2022    zinc sulfate (ZINCATE) 220 mg capsule   No No   Sig: Take 1 capsule (220 mg total) by mouth daily      Facility-Administered Medications Last Administration Doses Remaining   lidocaine (XYLOCAINE) 4 % topical solution 5 mL None recorded 1          Past Medical History:   Diagnosis Date    Arthritis     Breathing difficulty     Coronary artery disease     Diabetes mellitus ([facility] )     Diabetic neuropathy ([facility] ) 5/28/2021    Heart disease     CAD   s/p ptca with 1 stent 2012    Hidradenitis suppurativa     groin    History of transfusion     Hyperlipidemia     Hypertension     [facility] )     " silent " M I  in the past    Morbid obesity with BMI of 50 0-59 9, adult ([facility] )     Nicotine dependence     Obesity     Pilonidal cyst        Past Surgical History:   Procedure Laterality Date    CARDIAC SURGERY      CORONARY ANGIOPLASTY WITH STENT PLACEMENT      INCISION AND DRAINAGE OF WOUND N/A 1/24/2017    Procedure: INCISION AND DRAINAGE (I&D) BUTTOCK, PILONIDAL CYST;  Surgeon: Ian Kessler MD;  Location: [address] [place] [address];  Service:    [facility] SURGERY Left     I&D of left leg 2012    MD DEBRIDEMENT, SKIN, SUB-Q TISSUE,=<20 SQ CM Left 7/6/2021    Procedure: DEBRIDEMENT WOUND [facility] OUT); Surgeon: Cherri Fofana MD;  [facility];  Service: General    MD EXC SKIN BENIG >4 CM TRUNK,ARM,LEG Left 4/6/2021    Procedure: EXCISION THIGH MASS;  Surgeon: Cherri Fofana MD;  [facility];  Service: General    MD EXC SWEAT GLAND Venora Ashanti Left 7/6/2021    Procedure: EXCISION PERINEAL ABSCESS LEFT THIGH;  Surgeon: Cherri Fofana MD;  [facility];  Service: General    MD NEG PRESS WOUND TX, < 50 CM Left 4/6/2021    Procedure: APPLICATION VAC DRESSING THIGH;  Surgeon: Cherri Fofana MD;  [facility];  Service: General    WOUND DEBRIDEMENT Left 4/17/2021    Procedure: DEBRIDEMENT WOUND AND DRESSING CHANGE ([facility]); Surgeon: Kim Rodriguez DO;  [facility];  Service: General    WOUND DEBRIDEMENT Left 4/22/2021    Procedure: DEBRIDEMENT LOWER EXTREMITY [facility] OUT), left groin and thigh;  Surgeon: Cherri Fofana MD;  [facility];  Service: General    WOUND DEBRIDEMENT Left 5/26/2021    Procedure: DEBRIDEMENT LOWER EXTREMITY ([facility] OUT); Surgeon: Divya Vasquez DO;  [facility];  Service: General    WOUND DEBRIDEMENT Left 5/28/2021    Procedure: Washout left thigh wound and closure;  Surgeon: Dolly Thomas DO;  [facility];  Service: General       Family History   Problem Relation Age of Onset    Heart disease Father     Diabetes Father     Hypertension Mother     Heart disease Mother      I have reviewed and agree with the history as documented      E-Cigarette/Vaping    E-Cigarette Use Never User      E-Cigarette/Vaping Substances    Nicotine No     THC No     CBD No     Flavoring No      Social History     Tobacco Use    Smoking status: Former Smoker     Packs/day: 1 50     Years: 20 00     Pack years: 30 00     Types: Cigarettes     Start date: 2021     Quit date: 03/2021 Years since quittin 2    Smokeless tobacco: Never Used   Vaping Use    Vaping Use: Never used   Substance Use Topics    Alcohol use: Not Currently     Comment: rarely    Drug use: No       Review of Systems   Constitutional: Negative for chills and fever  Respiratory: Negative for cough, shortness of breath and wheezing  Cardiovascular: Negative for chest pain and palpitations  Gastrointestinal: Negative for abdominal pain, constipation, diarrhea, nausea and vomiting  Genitourinary: Negative for dysuria, flank pain, hematuria and urgency  Musculoskeletal: Positive for gait problem  Negative for back pain  Skin: Positive for color change and wound  Negative for rash  All other systems reviewed and are negative  Physical Exam                      Physical Exam  Vitals and nursing note reviewed  Constitutional:       Appearance: He is well-developed  HENT:      Head: Normocephalic and atraumatic  Eyes:      Pupils: Pupils are equal, round, and reactive to light  Cardiovascular:      Rate and Rhythm: Normal rate and regular rhythm  Heart sounds: Normal heart sounds  Pulmonary:      Effort: Pulmonary effort is normal       Breath sounds: Normal breath sounds  Abdominal:      General: Bowel sounds are normal  There is no distension  Palpations: Abdomen is soft  There is no mass  Tenderness: There is no abdominal tenderness  There is no guarding or rebound  Musculoskeletal:         General: Swelling and tenderness present  No signs of injury  Cervical back: Normal range of motion and neck supple  Left lower leg: Tenderness present  Edema present  Legs:    Skin:     General: Skin is warm and dry  Capillary Refill: Capillary refill takes less than 2 seconds  Neurological:      General: No focal deficit present  Mental Status: He is alert and oriented to person, place, and time     Psychiatric:         Behavior: Behavior normal  Thought Content:  Thought content normal          Judgment: Judgment normal          Vital Signs  ED Triage Vitals   Temperature Pulse Respirations Blood Pressure SpO2   06/13/22 0953 06/13/22 0953 06/13/22 0953 06/13/22 0953 06/13/22 0953   (!) 97 °F (36 1 °C) 86 20 124/80 94 %      Temp Source Heart Rate Source Patient Position - Orthostatic VS BP Location FiO2 (%)   06/13/22 0953 06/13/22 1224 06/13/22 0953 06/13/22 0953 --   Temporal Monitor Sitting Right arm       Pain Score       06/13/22 1200       10 - Worst Possible Pain           Vitals:    06/13/22 1704 06/13/22 1718 06/13/22 1939 06/13/22 1955   BP: (!) 92/49 123/74  96/65   Pulse: 69 71 67 66   Patient Position - Orthostatic VS:             Visual Acuity      ED Medications  Medications   albuterol (PROVENTIL HFA,VENTOLIN HFA) inhaler 2 puff (has no administration in time range)   amLODIPine (NORVASC) tablet 10 mg (has no administration in time range)   aspirin (ECOTRIN LOW STRENGTH) EC tablet 81 mg (has no administration in time range)   atorvastatin (LIPITOR) tablet 80 mg (80 mg Oral Given 6/13/22 1728)   carvedilol (COREG) tablet 6 25 mg (6 25 mg Oral Given 6/13/22 1728)   insulin lispro (HumaLOG) 100 units/mL subcutaneous injection 2-12 Units (2 Units Subcutaneous Not Given 6/13/22 2101)   fish oil capsule 1,000 mg (1,000 mg Oral Given 6/13/22 1728)   zinc sulfate (ZINCATE) capsule 220 mg (has no administration in time range)   acetaminophen (TYLENOL) tablet 650 mg (has no administration in time range)   enoxaparin (LOVENOX) subcutaneous injection 40 mg (has no administration in time range)   sodium chloride 0 9 % infusion (125 mL/hr Intravenous New Bag 6/13/22 1728)   ceFAZolin (ANCEF) IVPB (premix in dextrose) 2,000 mg 50 mL (2,000 mg Intravenous New Bag 6/13/22 2054)   HYDROmorphone (DILAUDID) injection 0 5 mg (has no administration in time range)   clindamycin (CLEOCIN) IVPB (premix in dextrose) 600 mg 50 mL (0 mg Intravenous Stopped 6/13/22 1257)   ketorolac (TORADOL) injection 15 mg (15 mg Intravenous Given 6/13/22 1200)   ondansetron (ZOFRAN) injection 4 mg (4 mg Intravenous Given 6/13/22 1200)   sodium chloride 0 9 % bolus 1,000 mL (0 mL Intravenous Stopped 6/13/22 1442)   morphine injection 4 mg (4 mg Intravenous Given 6/13/22 1302)   oxyCODONE-acetaminophen (PERCOCET) 5-325 mg per tablet 1 tablet (1 tablet Oral Given 6/13/22 1419)       Diagnostic Studies  Results Reviewed     Procedure Component Value Units Date/Time    Blood culture #1 [[id number]] Collected: 06/13/22 1211    Lab Status: Preliminary result Specimen: Blood from Hand, Left Updated: 06/13/22 1603     Blood Culture Received in Microbiology Lab  Culture in Progress  Blood culture #2 [[id number]] Collected: 06/13/22 1153    Lab Status: Preliminary result Specimen: Blood from Arm, Left Updated: 06/13/22 1504     Blood Culture Received in Microbiology Lab  Culture in Progress  COVID/FLU/RSV - 2 hour TAT [[id number]]  (Normal) Collected: 06/13/22 1155    Lab Status: Final result Specimen: Nares from Nose Updated: 06/13/22 1250     SARS-CoV-2 Negative     INFLUENZA A PCR Negative     INFLUENZA B PCR Negative     RSV PCR Negative    Narrative:      FOR PEDIATRIC PATIENTS - copy/paste COVID Guidelines URL to browser: [email or web address]    SARS-CoV-2 assay is a Nucleic Acid Amplification assay intended for the  qualitative detection of nucleic acid from SARS-CoV-2 in nasopharyngeal  swabs  Results are for the presumptive identification of SARS-CoV-2 RNA  Positive results are indicative of infection with SARS-CoV-2, the virus  causing COVID-19, but do not rule out bacterial infection or co-infection  with other viruses  Laboratories within the [place] and its  territories are required to report all positive results to the appropriate  public health authorities   Negative results do not preclude SARS-CoV-2  infection and should not be used as the sole basis for treatment or other  patient management decisions  Negative results must be combined with  clinical observations, patient history, and epidemiological information  This test has not been FDA cleared or approved  This test has been authorized by FDA under an Emergency Use Authorization  (EUA)  This test is only authorized for the duration of time the  declaration that circumstances exist justifying the authorization of the  emergency use of an in vitro diagnostic tests for detection of SARS-CoV-2  virus and/or diagnosis of COVID-19 infection under section 564(b)(1) of  the Act, 21 U  S C  553EII-6(V)(2), unless the authorization is terminated  or revoked sooner  The test has been validated but independent review by FDA  and CLIA is pending  Test performed using [employer]: This RT-PCR assay targets N2,  a region unique to SARS-CoV-2  A conserved region in the E-gene was chosen  for pan-Sarbecovirus detection which includes SARS-CoV-2  C-reactive protein [[id number]]  (Abnormal) Collected: 06/13/22 1153    Lab Status: Final result Specimen: Blood from Arm, Left Updated: 06/13/22 1239      1 mg/L     Lactic acid [[id number]]  (Normal) Collected: 06/13/22 1153    Lab Status: Final result Specimen: Blood from Arm, Left Updated: 06/13/22 1227     LACTIC ACID 0 9 mmol/L     Narrative:      Result may be elevated if tourniquet was used during collection      Comprehensive metabolic panel [[id number]]  (Abnormal) Collected: 06/13/22 1153    Lab Status: Final result Specimen: Blood from Arm, Left Updated: 06/13/22 1222     Sodium 136 mmol/L      Potassium 3 8 mmol/L      Chloride 99 mmol/L      CO2 27 mmol/L      ANION GAP 10 mmol/L      BUN 18 mg/dL      Creatinine 1 41 mg/dL      Glucose 131 mg/dL      Calcium 8 4 mg/dL      Corrected Calcium 9 6 mg/dL      AST 21 U/L      ALT 18 U/L      Alkaline Phosphatase 98 U/L      Total Protein 7 9 g/dL      Albumin 2 5 g/dL Total Bilirubin 0 40 mg/dL      eGFR 61 ml/min/1 73sq m     Narrative:      National Kidney Disease Foundation guidelines for Chronic Kidney Disease (CKD):     Stage 1 with normal or high GFR (GFR > 90 mL/min/1 73 square meters)    Stage 2 Mild CKD (GFR = 60-89 mL/min/1 73 square meters)    Stage 3A Moderate CKD (GFR = 45-59 mL/min/1 73 square meters)    Stage 3B Moderate CKD (GFR = 30-44 mL/min/1 73 square meters)    Stage 4 Severe CKD (GFR = 15-29 mL/min/1 73 square meters)    Stage 5 End Stage CKD (GFR <15 mL/min/1 73 square meters)  Note: GFR calculation is accurate only with a steady state creatinine    Magnesium [[id number]]  (Normal) Collected: 06/13/22 1153    Lab Status: Final result Specimen: Blood from Arm, Left Updated: 06/13/22 1222     Magnesium 1 6 mg/dL     APTT [[id number]]  (Abnormal) Collected: 06/13/22 1153    Lab Status: Final result Specimen: Blood from Arm, Left Updated: 06/13/22 1221     PTT 38 seconds     Protime-INR [[id number]]  (Normal) Collected: 06/13/22 1153    Lab Status: Final result Specimen: Blood from Arm, Left Updated: 06/13/22 1221     Protime 14 2 seconds      INR 1 12    CBC and differential [[id number]]  (Abnormal) Collected: 06/13/22 1153    Lab Status: Final result Specimen: Blood from Arm, Left Updated: 06/13/22 1205     WBC 8 26 Thousand/uL      RBC 4 50 Million/uL      Hemoglobin 11 7 g/dL      Hematocrit 36 8 %      MCV 82 fL      MCH 26 0 pg      MCHC 31 8 g/dL      RDW 17 3 %      MPV 9 7 fL      Platelets 204 Thousands/uL      nRBC 0 /100 WBCs      Neutrophils Relative 81 %      Immat GRANS % 1 %      Lymphocytes Relative 10 %      Monocytes Relative 8 %      Eosinophils Relative 0 %      Basophils Relative 0 %      Neutrophils Absolute 6 66 Thousands/µL      Immature Grans Absolute 0 06 Thousand/uL      Lymphocytes Absolute 0 86 Thousands/µL      Monocytes Absolute 0 63 Thousand/µL      Eosinophils Absolute 0 03 Thousand/µL      Basophils Absolute 0 02 Thousands/µL                  VAS lower limb venous duplex study, unilateral/limited    (Results Pending)              Procedures  Procedures         ED Course                               SBIRT 20yo+    Flowsheet Row Most Recent Value   SBIRT (25 yo +)    In order to provide better care to our patients, we are screening all of our patients for alcohol and drug use  Would it be okay to ask you these screening questions? No Filed at: 06/13/2022 1201                    MDM  Number of Diagnoses or Management Options  CLOVIS (acute kidney injury) [facility]): new and requires workup  Cellulitis: new and requires workup     Amount and/or Complexity of Data Reviewed  Clinical lab tests: ordered and reviewed  Tests in the radiology section of CPT®: ordered and reviewed    Risk of Complications, Morbidity, and/or Mortality  Presenting problems: high  Diagnostic procedures: high  Management options: high    Patient Progress  Patient progress: stable      Disposition  Final diagnoses:   Cellulitis   CLOVIS (acute kidney injury) ([facility] [address] )     Time reflects when diagnosis was documented in both MDM as applicable and the Disposition within this note     Time User Action Codes Description Comment    6/13/2022  2:00 PM [facility] Add [L03 90] Cellulitis     6/13/2022  2:01 PM [facility] O Add [N17 9] CLOVIS (acute kidney injury) [facility])       ED Disposition     ED Disposition   Admit    Condition   Stable    Date/Time   Mon Jun 13, 2022  2:00 PM    Comment   Case was discussed with Dr Carline Andrade and the patient's admission status was agreed to be Admission Status: observation status to the service of Dr Carline Andrade              Follow-up Information    None         Current Discharge Medication List      CONTINUE these medications which have NOT CHANGED    Details   amLODIPine (NORVASC) 10 mg tablet Take 1 tablet (10 mg total) by mouth daily  Qty: 90 tablet, Refills: 1    Associated Diagnoses: Essential hypertension      aspirin (ECOTRIN LOW STRENGTH) 81 mg EC tablet Take 1 tablet (81 mg total) by mouth daily  Qty: 30 tablet, Refills: 0    Associated Diagnoses: CAD (coronary artery disease)      atorvastatin (LIPITOR) 80 mg tablet Take 1 tablet (80 mg total) by mouth daily  Qty: 90 tablet, Refills: 1    Associated Diagnoses: Dyslipidemia      carvedilol (COREG) 6 25 mg tablet Take 1 tablet (6 25 mg total) by mouth 2 (two) times a day with meals  Qty: 180 tablet, Refills: 1    Associated Diagnoses: Essential hypertension      lisinopril (ZESTRIL) 20 mg tablet Take 1 tablet (20 mg total) by mouth daily  Qty: 90 tablet, Refills: 1    Associated Diagnoses: Essential hypertension      metFORMIN (GLUCOPHAGE) 1000 MG tablet Take 1 tablet (1,000 mg total) by mouth 2 (two) times a day with meals  Qty: 180 tablet, Refills: 1    Associated Diagnoses: Type 2 diabetes mellitus with hyperglycemia, without long-term current use of insulin (HCC)      Omega-3 Fatty Acids (fish oil) 1,000 mg Take 1 capsule (1,000 mg total) by mouth 2 (two) times a day  Qty: 180 capsule, Refills: 2    Associated Diagnoses: Dyslipidemia      albuterol (ProAir HFA) 90 mcg/act inhaler Inhale 2 puffs every 6 (six) hours as needed for wheezing  Qty: 8 5 g, Refills: 3    Comments: Substitution to a formulary equivalent within the same pharmaceutical class is authorized    Associated Diagnoses: Shortness of breath      ibuprofen (MOTRIN) 600 mg tablet Take 1 tablet (600 mg total) by mouth every 6 (six) hours as needed for mild pain or moderate pain  Qty: 20 tablet, Refills: 0    Associated Diagnoses: Colitis; Cellulitis of multiple sites of trunk      Insulin Pen Needle (PEN NEEDLES 29GX1/2") 29G X 12MM MISC Use 1 needle daily for subcutaneous injections  Qty: 50 each, Refills: 3    Associated Diagnoses: DM type 2 with diabetic peripheral neuropathy (HCC)      liraglutide (VICTOZA) injection Inject 0 1 mL (0 6 mg total) under the skin daily Start at 0 6 mg daily for a week, then increase to 1 2 mg daily on the second week, then increase to 1 8 mg daily on the third week and going forward  Qty: 3 mL, Refills: 3    Associated Diagnoses: DM type 2 with diabetic peripheral neuropathy ([address] ); Morbid obesity (HCC)      ticagrelor (Brilinta) 90 MG Take 1 tablet (90 mg total) by mouth every 12 (twelve) hours  Qty: 180 tablet, Refills: 2    Associated Diagnoses: Coronary artery disease, unspecified vessel or lesion type, unspecified whether angina present, unspecified whether native or transplanted heart      zinc sulfate (ZINCATE) 220 mg capsule Take 1 capsule (220 mg total) by mouth daily  Qty: 30 capsule, Refills: 0    Associated Diagnoses: Cellulitis of abdominal wall             No discharge procedures on file      PDMP Review       Value Time User    PDMP Reviewed  Yes 4/29/2021 10:52 AM Jackeline Hopper PA-C          ED Provider  Electronically Signed by           Jerry Garcia DO  06/13/22 4280

## 2022-06-13 NOTE — H&P
H&P Exam - Reena Villanueva 40 y o  male MRN: [id number]    Unit/Bed#: 2 [place] [address] Encounter: [id number]    Assessment:  39 yo male with PMH of morbid obesity, CAD s/p stent, HTN, DMT2 with hidradenitis suppurativa, and nocturnal hypoxia presents with left calf pain and increased swelling from baseline  Patient is admitted for management of cellulitis under the supervision of Dr Betancourt  Estimated length of stay less than 2 midnights  Plan:    * Cellulitis of left lower extremity  Assessment & Plan  · Present on admission with erythematous, warm, and tender to touch of the left lower and upper extremity  · No open wounds or point of infection noted on physical exam  · Given Cipro 600 mg in the ED  · Started on Ancef 2 g q 8 hours  · Monitor lines of demarcation and fever curve  · Pending in VSA study results  · Pending blood culture results  · Pain management with Tylenol p r n  For mild to moderate and Dilaudid 0 1 mg for severe pain    CLOVIS (acute kidney injury) ([facility] [address] )  Assessment & Plan  · Elevated creatinine on admission at 1 41 in the setting of vomiting about 2 days prior to admission  · No prior H/O CKD, and baseline CR appears to be within normal limit  · Hold nephrotoxins including lisinopril and metformin  · Continue with IVF hydration  · Monitor BMP daily    Type 2 diabetes mellitus with diabetic polyneuropathy, without long-term current use of insulin (HCC)  Assessment & Plan  Lab Results   Component Value Date    HGBA1C 7 5 (H) 03/30/2022       No results for input(s): POCGLU in the last 72 hours      Blood Sugar Average: Last 72 hrs:     Hold home Victoza metformin  ISS and monitor serum glucose    History of hidradenitis suppurativa  Assessment & Plan  Patient has history of hidranitis suppurativa with wounds currently present on left groin and abdominal pannus    · Local wound care  · Antibiotics as noted     History of coronary angioplasty with insertion of stent  Assessment & Plan  H/o cardiac catheterization in 2010 and 2020  Stents placed in [facility] in 2020, 2 total   Stable  Continue on ASA and statin    Anemia  Assessment & Plan  Chronic and stable anemia with low normal MCV  Continue to monitor CBC    Essential hypertension  Assessment & Plan  Stable  Continue on home doses of amlodipine and carvedilol  Discontinue lisinopril in the setting of CLOVIS  Monitor blood pressure    Global:   cc/hour, diabetic diet with low sodium, replete electrolytes PRN  VTE:  Lovenox 40 mg subQ daily and SCD    History of Present Illness     39 yo male with PMH of morbid obesity, CAD s/p stent, HTN, DMT2 with hidradenitis suppurativa, and nocturnal hypoxia presents with left calf pain and increased swelling from baseline  Also reported generalized weakness, loss of appetite, and dizziness  Symptoms started Friday night after family dinner at the [facility]  He reported waking up in the middle of the night on Friday and vomited  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  Patient noticed worsening of swelling and 10/10 and left leg pain  ED course:  Clindamycin 600 mg, Toradol 15 mg, Zofran 4 mg, NS 1 L, morphine 4 mg, Percocet 1 tablet    Review of Systems   Constitutional: Negative for chills and fever  HENT: Negative for ear pain and sore throat  Eyes: Negative for pain and visual disturbance  Respiratory: Negative for cough and shortness of breath  Cardiovascular: Negative for chest pain and palpitations  Gastrointestinal: Negative for abdominal pain and vomiting  Genitourinary: Negative for dysuria and hematuria  Musculoskeletal: Positive for myalgias (Left lower leg pain)  Negative for arthralgias, back pain and joint swelling  Skin: Negative for color change and rash  Neurological: Negative for seizures and syncope  All other systems reviewed and are negative        Historical Information   Past Medical History:   Diagnosis Date    Arthritis     Breathing difficulty  Coronary artery disease     Diabetes mellitus ([place] [address] )     Diabetic neuropathy ([place] [address] ) 5/28/2021    Heart disease     CAD   s/p ptca with 1 stent 2012    Hidradenitis suppurativa     groin    History of transfusion     Hyperlipidemia     Hypertension     MI (myocardial infarction) ([place] [address] )     " silent " M I  in the past    Morbid obesity with BMI of 50 0-59 9, adult ([place] [address] )     Nicotine dependence     Obesity     Pilonidal cyst      Past Surgical History:   Procedure Laterality Date    CARDIAC SURGERY      CORONARY ANGIOPLASTY WITH STENT PLACEMENT      INCISION AND DRAINAGE OF WOUND N/A 1/24/2017    Procedure: INCISION AND DRAINAGE (I&D) BUTTOCK, PILONIDAL CYST;  Surgeon: Antwon Villalpando MD;  Location: [address] [place] [address];  Service:    Elaina Crystal LEG SURGERY Left     I&D of left leg 2012    TX DEBRIDEMENT, SKIN, SUB-Q TISSUE,=<20 SQ CM Left 7/6/2021    Procedure: DEBRIDEMENT WOUND [facility] OUT); Surgeon: Tiffanie Spence MD;  [facility];  Service: General    TX EXC SKIN BENIG >4 CM TRUNK,ARM,LEG Left 4/6/2021    Procedure: EXCISION THIGH MASS;  Surgeon: Tiffanie Spence MD;  [facility];  Service: General    TX EXC SWEAT GLAND Lopez Perez Left 7/6/2021    Procedure: EXCISION PERINEAL ABSCESS LEFT THIGH;  Surgeon: Tiffanie Spence MD;  [facility];  Service: General    TX NEG PRESS WOUND TX, < 50 CM Left 4/6/2021    Procedure: APPLICATION VAC DRESSING THIGH;  Surgeon: Tiffanie Spence MD;  [facility];  Service: General    WOUND DEBRIDEMENT Left 4/17/2021    Procedure: DEBRIDEMENT WOUND AND DRESSING CHANGE ([address] OUT); Surgeon: Bharati Vuong DO;  [facility];  Service: General    WOUND DEBRIDEMENT Left 4/22/2021    Procedure: DEBRIDEMENT LOWER EXTREMITY [facility] OUT), left groin and thigh;  Surgeon: Tiffanie Spence MD;  [facility];  Service: General    WOUND DEBRIDEMENT Left 5/26/2021    Procedure: DEBRIDEMENT LOWER EXTREMITY ([address] OUT); Surgeon:  Yojana Otero DO;  [facility];  Service: General    WOUND DEBRIDEMENT Left 2021    Procedure: Washout left thigh wound and closure;  Surgeon: Dolly Thomas DO;  [facility];  Service: General     Social History   Social History     Substance and Sexual Activity   Alcohol Use Not Currently    Comment: rarely     Social History     Substance and Sexual Activity   Drug Use No     Social History     Tobacco Use   Smoking Status Former Smoker    Packs/day: 1 50    Years: 20 00    Pack years: 30 00    Types: Cigarettes    Start date:    Cristi Cho Quit date: 2021    Years since quittin 2   Smokeless Tobacco Never Used     E-Cigarette Use: Never User     E-Cigarette/Vaping Substances    Nicotine No     THC No     CBD No     Flavoring No        Family History:   Family History   Problem Relation Age of Onset    Heart disease Father     Diabetes Father     Hypertension Mother     Heart disease Mother        Meds/Allergies   PTA meds:   Prior to Admission Medications   Prescriptions Last Dose Informant Patient Reported? Taking?    Insulin Pen Needle (PEN NEEDLES 29GX1/2") 29G X 12MM MISC   No No   Sig: Use 1 needle daily for subcutaneous injections   Patient not taking: Reported on 2022    Omega-3 Fatty Acids (fish oil) 1,000 mg 2022 at Unknown time  No Yes   Sig: Take 1 capsule (1,000 mg total) by mouth 2 (two) times a day   albuterol (ProAir HFA) 90 mcg/act inhaler Unknown at Unknown time  No No   Sig: Inhale 2 puffs every 6 (six) hours as needed for wheezing   amLODIPine (NORVASC) 10 mg tablet 2022 at Unknown time  No Yes   Sig: Take 1 tablet (10 mg total) by mouth daily   aspirin (ECOTRIN LOW STRENGTH) 81 mg EC tablet 2022 at Unknown time  No Yes   Sig: Take 1 tablet (81 mg total) by mouth daily   atorvastatin (LIPITOR) 80 mg tablet 2022 at Unknown time  No Yes   Sig: Take 1 tablet (80 mg total) by mouth daily   carvedilol (COREG) 6 25 mg tablet 2022 at Unknown time  No Yes Sig: Take 1 tablet (6 25 mg total) by mouth 2 (two) times a day with meals   ibuprofen (MOTRIN) 600 mg tablet Unknown at Unknown time  No No   Sig: Take 1 tablet (600 mg total) by mouth every 6 (six) hours as needed for mild pain or moderate pain   liraglutide (VICTOZA) injection More than a month at Unknown time  No No   Sig: Inject 0 1 mL (0 6 mg total) under the skin daily Start at 0 6 mg daily for a week, then increase to 1 2 mg daily on the second week, then increase to 1 8 mg daily on the third week and going forward     lisinopril (ZESTRIL) 20 mg tablet 6/12/2022 at Unknown time  No Yes   Sig: Take 1 tablet (20 mg total) by mouth daily   metFORMIN (GLUCOPHAGE) 1000 MG tablet 6/12/2022 at Unknown time  No Yes   Sig: Take 1 tablet (1,000 mg total) by mouth 2 (two) times a day with meals   ticagrelor (Brilinta) 90 MG   No No   Sig: Take 1 tablet (90 mg total) by mouth every 12 (twelve) hours   Patient not taking: Reported on 5/2/2022    zinc sulfate (ZINCATE) 220 mg capsule   No No   Sig: Take 1 capsule (220 mg total) by mouth daily      Facility-Administered Medications Last Administration Doses Remaining   lidocaine (XYLOCAINE) 4 % topical solution 5 mL None recorded 1        Allergies   Allergen Reactions    Amoxicillin Hives     childhood     Objective   First Vitals:   Blood Pressure: 124/80 (06/13/22 0953)  Pulse: 86 (06/13/22 0953)  Temperature: (!) 97 °F (36 1 °C) (06/13/22 0953)  Temp Source: Temporal (06/13/22 0953)  Respirations: 20 (06/13/22 0953)  Weight - Scale: (!) 167 kg (368 lb 6 2 oz) (06/13/22 0953)  SpO2: 94 % (06/13/22 0953)    Current Vitals:   Blood Pressure: (!) 92/49 (06/13/22 1704)  Pulse: 69 (06/13/22 1704)  Temperature: 98 1 °F (36 7 °C) (06/13/22 1704)  Temp Source: Temporal (06/13/22 0953)  Respirations: 19 (06/13/22 1704)  Weight - Scale: (!) 167 kg (368 lb 6 2 oz) (06/13/22 0978)  SpO2: 97 % (06/13/22 1704)      Intake/Output Summary (Last 24 hours) at 6/13/2022 1705  Last data filed at 6/13/2022 1442  Gross per 24 hour   Intake 1000 ml   Output --   Net 1000 ml       Invasive Devices  Report    Peripheral Intravenous Line  Duration           Peripheral IV 06/13/22 Left;Proximal;Ventral (anterior) Forearm <1 day                Physical Exam  Vitals and nursing note reviewed  Constitutional:       Appearance: He is well-developed  HENT:      Head: Normocephalic and atraumatic  Cardiovascular:      Rate and Rhythm: Normal rate and regular rhythm  Pulses: Normal pulses  Heart sounds: Normal heart sounds  No murmur heard  Pulmonary:      Effort: Pulmonary effort is normal  No respiratory distress  Breath sounds: Normal breath sounds  Abdominal:      General: Bowel sounds are normal       Palpations: Abdomen is soft  Tenderness: There is no abdominal tenderness  Musculoskeletal:      Cervical back: Neck supple  Right lower leg: No edema  Left lower leg: Edema (baseline lymphedema) present  Comments: Erythematous, warm, and tender to touch at the left lower and upper leg  Baseline left leg lymphedema   Skin:     General: Skin is warm and dry  Neurological:      Mental Status: He is alert  Lab Results:   Results for orders placed or performed during the hospital encounter of 06/13/22   Blood culture #1    Specimen: Hand, Left; Blood   Result Value Ref Range    Blood Culture Received in Microbiology Lab  Culture in Progress  Blood culture #2    Specimen: Arm, Left; Blood   Result Value Ref Range    Blood Culture Received in Microbiology Lab  Culture in Progress      COVID/FLU/RSV - 2 hour TAT    Specimen: Nose; Nares   Result Value Ref Range    SARS-CoV-2 Negative Negative    INFLUENZA A PCR Negative Negative    INFLUENZA B PCR Negative Negative    RSV PCR Negative Negative   CBC and differential   Result Value Ref Range    WBC 8 26 4 31 - 10 16 Thousand/uL    RBC 4 50 3 88 - 5 62 Million/uL    Hemoglobin 11 7 (L) 12 0 - 17 0 g/dL Hematocrit 36 8 36 5 - 49 3 %    MCV 82 82 - 98 fL    MCH 26 0 (L) 26 8 - 34 3 pg    MCHC 31 8 31 4 - 37 4 g/dL    RDW 17 3 (H) 11 6 - 15 1 %    MPV 9 7 8 9 - 12 7 fL    Platelets 052 582 - 398 Thousands/uL    nRBC 0 /100 WBCs    Neutrophils Relative 81 (H) 43 - 75 %    Immat GRANS % 1 0 - 2 %    Lymphocytes Relative 10 (L) 14 - 44 %    Monocytes Relative 8 4 - 12 %    Eosinophils Relative 0 0 - 6 %    Basophils Relative 0 0 - 1 %    Neutrophils Absolute 6 66 1 85 - 7 62 Thousands/µL    Immature Grans Absolute 0 06 0 00 - 0 20 Thousand/uL    Lymphocytes Absolute 0 86 0 60 - 4 47 Thousands/µL    Monocytes Absolute 0 63 0 17 - 1 22 Thousand/µL    Eosinophils Absolute 0 03 0 00 - 0 61 Thousand/µL    Basophils Absolute 0 02 0 00 - 0 10 Thousands/µL   Comprehensive metabolic panel   Result Value Ref Range    Sodium 136 136 - 145 mmol/L    Potassium 3 8 3 5 - 5 3 mmol/L    Chloride 99 (L) 100 - 108 mmol/L    CO2 27 21 - 32 mmol/L    ANION GAP 10 4 - 13 mmol/L    BUN 18 5 - 25 mg/dL    Creatinine 1 41 (H) 0 60 - 1 30 mg/dL    Glucose 131 65 - 140 mg/dL    Calcium 8 4 8 3 - 10 1 mg/dL    Corrected Calcium 9 6 8 3 - 10 1 mg/dL    AST 21 5 - 45 U/L    ALT 18 12 - 78 U/L    Alkaline Phosphatase 98 46 - 116 U/L    Total Protein 7 9 6 4 - 8 2 g/dL    Albumin 2 5 (L) 3 5 - 5 0 g/dL    Total Bilirubin 0 40 0 20 - 1 00 mg/dL    eGFR 61 ml/min/1 73sq m   Lactic acid   Result Value Ref Range    LACTIC ACID 0 9 0 5 - 2 0 mmol/L   Magnesium   Result Value Ref Range    Magnesium 1 6 1 6 - 2 6 mg/dL   Protime-INR   Result Value Ref Range    Protime 14 2 11 6 - 14 5 seconds    INR 1 12 0 84 - 1 19   APTT   Result Value Ref Range    PTT 38 (H) 23 - 37 seconds   C-reactive protein   Result Value Ref Range     1 (H) <3 0 mg/L     Imaging:   VAS lower limb venous duplex study, unilateral/limited    (Results Pending)       EKG, Pathology, and Other Studies:  Reviewed pertinent formations    Code Status: Level 1 - Full Code  Advance Directive and Living Will:      Power of :    POLST:      Counseling / Coordination of Care: Total floor / unit time spent today 30 minutes

## 2022-06-13 NOTE — ASSESSMENT & PLAN NOTE
· Elevated creatinine on admission at 1 41 in the setting of vomiting about 2 days prior to admission  · No prior H/O CKD, and baseline CR appears to be within normal limit  · Held nephrotoxins including lisinopril and metformin   Can continue on lisinopril since CLOVIS resolved  · Given VF hydration  · Monitor BMP daily Home

## 2022-06-13 NOTE — ASSESSMENT & PLAN NOTE
Persistent erythema and swelling of the left lower leg, but left anterior thigh swelling and erythema has improved  No open wounds or point of infection noted on physical exam  · Given Cipro 600 mg in the ED  · Started on Ancef 2 g q 8 hours  · Monitor lines of demarcation and fever curve  · VAS showed no evidence of acute or chronic DVTs  · Following blood culture results  · Pain management with Tylenol p r n   For mild to moderate, percocet for moderate, and Dilaudid 0 2 mg for breakthrough pain

## 2022-06-14 LAB
ANION GAP SERPL CALCULATED.3IONS-SCNC: 8 MMOL/L (ref 4–13)
BASOPHILS # BLD AUTO: 0.02 THOUSANDS/ΜL (ref 0–0.1)
BASOPHILS NFR BLD AUTO: 0 % (ref 0–1)
BUN SERPL-MCNC: 29 MG/DL (ref 5–25)
CALCIUM SERPL-MCNC: 7.9 MG/DL (ref 8.3–10.1)
CHLORIDE SERPL-SCNC: 100 MMOL/L (ref 100–108)
CO2 SERPL-SCNC: 27 MMOL/L (ref 21–32)
CREAT SERPL-MCNC: 1.85 MG/DL (ref 0.6–1.3)
EOSINOPHIL # BLD AUTO: 0.11 THOUSAND/ΜL (ref 0–0.61)
EOSINOPHIL NFR BLD AUTO: 2 % (ref 0–6)
ERYTHROCYTE [DISTWIDTH] IN BLOOD BY AUTOMATED COUNT: 17.6 % (ref 11.6–15.1)
GFR SERPL CREATININE-BSD FRML MDRD: 44 ML/MIN/1.73SQ M
GLUCOSE SERPL-MCNC: 106 MG/DL (ref 65–140)
GLUCOSE SERPL-MCNC: 108 MG/DL (ref 65–140)
GLUCOSE SERPL-MCNC: 123 MG/DL (ref 65–140)
GLUCOSE SERPL-MCNC: 126 MG/DL (ref 65–140)
GLUCOSE SERPL-MCNC: 157 MG/DL (ref 65–140)
HCT VFR BLD AUTO: 32 % (ref 36.5–49.3)
HGB BLD-MCNC: 9.8 G/DL (ref 12–17)
IMM GRANULOCYTES # BLD AUTO: 0.04 THOUSAND/UL (ref 0–0.2)
IMM GRANULOCYTES NFR BLD AUTO: 1 % (ref 0–2)
LYMPHOCYTES # BLD AUTO: 0.95 THOUSANDS/ΜL (ref 0.6–4.47)
LYMPHOCYTES NFR BLD AUTO: 15 % (ref 14–44)
MCH RBC QN AUTO: 25.7 PG (ref 26.8–34.3)
MCHC RBC AUTO-ENTMCNC: 30.6 G/DL (ref 31.4–37.4)
MCV RBC AUTO: 84 FL (ref 82–98)
MONOCYTES # BLD AUTO: 0.65 THOUSAND/ΜL (ref 0.17–1.22)
MONOCYTES NFR BLD AUTO: 10 % (ref 4–12)
NEUTROPHILS # BLD AUTO: 4.58 THOUSANDS/ΜL (ref 1.85–7.62)
NEUTS SEG NFR BLD AUTO: 72 % (ref 43–75)
NRBC BLD AUTO-RTO: 0 /100 WBCS
PLATELET # BLD AUTO: 238 THOUSANDS/UL (ref 149–390)
PMV BLD AUTO: 9.6 FL (ref 8.9–12.7)
POTASSIUM SERPL-SCNC: 3.9 MMOL/L (ref 3.5–5.3)
RBC # BLD AUTO: 3.81 MILLION/UL (ref 3.88–5.62)
SODIUM SERPL-SCNC: 135 MMOL/L (ref 136–145)
WBC # BLD AUTO: 6.35 THOUSAND/UL (ref 4.31–10.16)

## 2022-06-14 PROCEDURE — 99225 PR SBSQ OBSERVATION CARE/DAY 25 MINUTES: CPT | Performed by: STUDENT IN AN ORGANIZED HEALTH CARE EDUCATION/TRAINING PROGRAM

## 2022-06-14 PROCEDURE — 80048 BASIC METABOLIC PNL TOTAL CA: CPT

## 2022-06-14 PROCEDURE — 82948 REAGENT STRIP/BLOOD GLUCOSE: CPT

## 2022-06-14 PROCEDURE — 97166 OT EVAL MOD COMPLEX 45 MIN: CPT

## 2022-06-14 PROCEDURE — 93971 EXTREMITY STUDY: CPT | Performed by: SURGERY

## 2022-06-14 PROCEDURE — 85025 COMPLETE CBC W/AUTO DIFF WBC: CPT

## 2022-06-14 PROCEDURE — NC001 PR NO CHARGE: Performed by: STUDENT IN AN ORGANIZED HEALTH CARE EDUCATION/TRAINING PROGRAM

## 2022-06-14 RX ORDER — HYDROMORPHONE HCL/PF 1 MG/ML
0.5 SYRINGE (ML) INJECTION EVERY 6 HOURS PRN
Status: DISCONTINUED | OUTPATIENT
Start: 2022-06-14 | End: 2022-06-14

## 2022-06-14 RX ORDER — HYDROMORPHONE HCL/PF 1 MG/ML
0.2 SYRINGE (ML) INJECTION EVERY 4 HOURS PRN
Status: DISCONTINUED | OUTPATIENT
Start: 2022-06-14 | End: 2022-06-15 | Stop reason: HOSPADM

## 2022-06-14 RX ORDER — SODIUM CHLORIDE 9 MG/ML
150 INJECTION, SOLUTION INTRAVENOUS CONTINUOUS
Status: DISCONTINUED | OUTPATIENT
Start: 2022-06-14 | End: 2022-06-14

## 2022-06-14 RX ORDER — OXYCODONE HYDROCHLORIDE AND ACETAMINOPHEN 5; 325 MG/1; MG/1
1 TABLET ORAL EVERY 4 HOURS PRN
Status: DISCONTINUED | OUTPATIENT
Start: 2022-06-14 | End: 2022-06-15 | Stop reason: HOSPADM

## 2022-06-14 RX ORDER — OXYCODONE HYDROCHLORIDE AND ACETAMINOPHEN 5; 325 MG/1; MG/1
1 TABLET ORAL EVERY 6 HOURS SCHEDULED
Status: DISCONTINUED | OUTPATIENT
Start: 2022-06-14 | End: 2022-06-14

## 2022-06-14 RX ORDER — HYDROMORPHONE HCL/PF 1 MG/ML
0.5 SYRINGE (ML) INJECTION EVERY 4 HOURS PRN
Status: DISCONTINUED | OUTPATIENT
Start: 2022-06-14 | End: 2022-06-14

## 2022-06-14 RX ADMIN — HYDROMORPHONE HYDROCHLORIDE 0.2 MG: 1 INJECTION, SOLUTION INTRAMUSCULAR; INTRAVENOUS; SUBCUTANEOUS at 19:45

## 2022-06-14 RX ADMIN — OMEGA-3 FATTY ACIDS CAP 1000 MG 1000 MG: 1000 CAP at 09:19

## 2022-06-14 RX ADMIN — INSULIN LISPRO 2 UNITS: 100 INJECTION, SOLUTION INTRAVENOUS; SUBCUTANEOUS at 12:39

## 2022-06-14 RX ADMIN — OMEGA-3 FATTY ACIDS CAP 1000 MG 1000 MG: 1000 CAP at 18:24

## 2022-06-14 RX ADMIN — CEFAZOLIN SODIUM 2000 MG: 2 SOLUTION INTRAVENOUS at 12:40

## 2022-06-14 RX ADMIN — AMLODIPINE BESYLATE 10 MG: 10 TABLET ORAL at 09:19

## 2022-06-14 RX ADMIN — OXYCODONE HYDROCHLORIDE AND ACETAMINOPHEN 1 TABLET: 5; 325 TABLET ORAL at 12:39

## 2022-06-14 RX ADMIN — ZINC SULFATE 220 MG (50 MG) CAPSULE 220 MG: CAPSULE at 09:19

## 2022-06-14 RX ADMIN — OXYCODONE HYDROCHLORIDE AND ACETAMINOPHEN 1 TABLET: 5; 325 TABLET ORAL at 18:24

## 2022-06-14 RX ADMIN — HYDROMORPHONE HYDROCHLORIDE 0.5 MG: 1 INJECTION, SOLUTION INTRAMUSCULAR; INTRAVENOUS; SUBCUTANEOUS at 05:28

## 2022-06-14 RX ADMIN — ENOXAPARIN SODIUM 40 MG: 40 INJECTION SUBCUTANEOUS at 09:19

## 2022-06-14 RX ADMIN — ASPIRIN 81 MG: 81 TABLET, COATED ORAL at 09:19

## 2022-06-14 RX ADMIN — ACETAMINOPHEN 650 MG: 325 TABLET ORAL at 15:45

## 2022-06-14 RX ADMIN — ATORVASTATIN CALCIUM 80 MG: 80 TABLET, FILM COATED ORAL at 15:49

## 2022-06-14 RX ADMIN — HYDROMORPHONE HYDROCHLORIDE 0.5 MG: 1 INJECTION, SOLUTION INTRAMUSCULAR; INTRAVENOUS; SUBCUTANEOUS at 11:33

## 2022-06-14 RX ADMIN — CEFAZOLIN SODIUM 2000 MG: 2 SOLUTION INTRAVENOUS at 05:29

## 2022-06-14 RX ADMIN — CARVEDILOL 6.25 MG: 6.25 TABLET, FILM COATED ORAL at 15:49

## 2022-06-14 RX ADMIN — OXYCODONE HYDROCHLORIDE AND ACETAMINOPHEN 1 TABLET: 5; 325 TABLET ORAL at 23:08

## 2022-06-14 RX ADMIN — CEFAZOLIN SODIUM 2000 MG: 2 SOLUTION INTRAVENOUS at 19:46

## 2022-06-14 RX ADMIN — CARVEDILOL 6.25 MG: 6.25 TABLET, FILM COATED ORAL at 09:19

## 2022-06-14 NOTE — ASSESSMENT & PLAN NOTE
Adequate oxygenation on room air during admission  Patient has H/O significant nocturnal hypoxia with recorded lowest desaturation of 59% on prior admissions  Patient was recommended to follow up with pulmonology for outpatient sleep study, but he has not done yet  He was also found to require oxygen upon exertion and was discharged with 2 L of O2 supplement for home, but patient was noncompliant with it    · Oxygen as needed overnight  · Monitor respiratory status   · Continue to encourage f/u outpatient for sleep study

## 2022-06-14 NOTE — CASE MANAGEMENT
Case Management Assessment & Discharge Planning Note    Patient name Luli Foster  Location 18 [facility] [address] MRN [id number]  : 1980 Date 2022       Current Admission Date: 2022  Current Admission Diagnosis:Cellulitis of left lower extremity   Patient Active Problem List    Diagnosis Date Noted    History of coronary angioplasty with insertion of stent 2022    Colitis 2022    Abscess of multiple sites of buttock 2022    Anemia 2022    Swelling of left lower extremity 2022    History of hidradenitis suppurativa 2022    Nocturnal hypoxia 2021    Cellulitis of multiple sites of trunk 2021    Chest pain 2021    Cellulitis of left lower extremity 2021    H/O drainage of abscess 2021    Difficult intubation 2021    Morbid obesity with body mass index (BMI) of 50 0 to 59 9 in adult [facility]) 03/10/2021    CLOVIS (acute kidney injury) ([facility] ) 2021    Type 1 non-ST elevation myocardial infarction (NSTEMI) ([facility] ) 2020    Left groin mass 2020    Type 2 diabetes mellitus with diabetic polyneuropathy, without long-term current use of insulin ([facility] ) 2018    Dyslipidemia 2017    Pilonidal cyst 2016    Essential hypertension 2016    Abnormal liver enzymes 2014    Coronary artery disease 2014      LOS (days): 0  Geometric Mean LOS (GMLOS) (days):   Days to GMLOS:     OBJECTIVE:        Current admission status: Observation    Preferred Pharmacy:   [facility] [address] [facility] - [address]  [address] [address]  Phone: [phone] Fax: [phone]    Primary Care Provider: Bette Tom MD    Primary Insurance: Mobile  Secondary Insurance:     ASSESSMENT:  [address] [place] [address] 162 Representative - Father   Primary Phone: [phone] [facility])            Readmission Root Cause  30 Day Readmission: No    Patient Information  Admitted from[de-identified] Home  Mental Status: Alert  During Assessment patient was accompanied by: Not accompanied during assessment  Assessment information provided by[de-identified] Patient  Primary Caregiver: Self  Support Systems: Son, Parent, [facility] of Residence: [address] [place] [address] do you live in?: Resonate entry access options   Select all that apply : No steps to enter home  Type of Current Residence: 2 story home  Upon entering residence, is there a bedroom on the main floor (no further steps)?: Yes  Upon entering residence, is there a bathroom on the main floor (no further steps)?: No  Indicate which floors of current residence have a bathroom (select all the apply):: 2nd Floor  Number of steps to 2nd floor from main floor: One Flight  In the last 12 months, was there a time when you were not able to pay the mortgage or rent on time?: No  In the last 12 months, how many places have you lived?: 1  In the last 12 months, was there a time when you did not have a steady place to sleep or slept in a shelter (including now)?: No  Homeless/housing insecurity resource given?: No  Living Arrangements: Lives w/ Son  Is patient a ?: No    Activities of Daily Living Prior to Admission  Functional Status: Independent  Completes ADLs independently?: Yes  Ambulates independently?: Yes  Does patient use assisted devices?: No  Does patient currently own DME?: No  Does patient have a history of Outpatient Therapy (PT/OT)?: No  Does the patient have a history of Short-Term Rehab?: No  Does patient have a history of HHC?: Yes (w/ [address])  Does patient currently have [address]?: No    Patient Information Continued  Income Source: Employed  Does patient have prescription coverage?: Yes  Within the past 12 months, you worried that your food would run out before you got the money to buy more : Never true  Within the past 12 months, the food you bought just didn't last and you didn't have money to get more : Never true  Food insecurity resource given?: No  Does patient receive dialysis treatments?: No  Does patient have a history of substance abuse?: No  Does patient have a history of Mental Health Diagnosis?: No    PHQ 2/9 Screening   Reviewed PHQ 2/9 Depression Screening Score?: No    Means of Transportation  Means of Transport to Appts[de-identified] Drives Self  In the past 12 months, has lack of transportation kept you from medical appointments or from getting medications?: No  In the past 12 months, has lack of transportation kept you from meetings, work, or from getting things needed for daily living?: No  Was application for public transport provided?: No    DISCHARGE DETAILS:    Discharge planning discussed with[de-identified] Patient  Freedom of Choice: Yes     CM contacted family/caregiver?: No- see comments (Declined call to father)  Were Treatment Team discharge recommendations reviewed with patient/caregiver?: Yes  Did patient/caregiver verbalize understanding of patient care needs?: Yes  Were patient/caregiver advised of the risks associated with not following Treatment Team discharge recommendations?: Yes    [facility]         Is the patient interested in [facility] at discharge?: No    DME Referral Provided  Referral made for DME?: No    Would you like to participate in our [address] service program?  : No - Declined    Treatment Team Recommendation: Home  Discharge Destination Plan[de-identified] Home  Transport at Discharge : [facility]     Additional Comments: Patient denies any current [facility] and states that he follows up at the [address] [address] as needed  Patient reports that his next visit is scheduled for next Tuesday (6/21/2022)  Patient denying any other needs from this CM at this time

## 2022-06-14 NOTE — UTILIZATION REVIEW
Initial Clinical Review    Observation 6/13/22 @ 1401 converted to inpatient admission 6/14/22 @ [phone] for continued care & tx for cellulitis LLE  Admission: Date/Time/Statement:   Admission Orders (From admission, onward)     Ordered        06/14/22 1454  Inpatient Admission  Once            06/13/22 1401  Place in Observation  Once                      Orders Placed This Encounter   Procedures    Inpatient Admission     Standing Status:   Standing     Number of Occurrences:   1     Order Specific Question:   Level of Care     Answer:   Med Surg [16]     Order Specific Question:   Estimated length of stay     Answer:   More than 2 Midnights     Order Specific Question:   Certification     Answer:   I certify that inpatient services are medically necessary for this patient for a duration of greater than two midnights  See H&P and MD Progress Notes for additional information about the patient's course of treatment  ED Arrival Information     Expected   -    Arrival   6/13/2022 09:07    Acuity   Urgent            Means of arrival   Walk-In    Escorted by   Self    Service   General Medicine    Admission type   Urgent            Arrival complaint   leg swelling, fever 101  Chief Complaint   Patient presents with    Leg Swelling     Left calf pain and swelling  Red, swollen, rash  Redness streaking into inner left thigh  Hx left groin tumor where left calf is usually swollen but not this size    Weakness - Generalized     Weakness, n/v Friday, loss of appetite, generalized "sick not feeling right" dizziness ongoing since Friday with all symptoms getting worse over weekend     Initial Presentation:   40 y o  male hx chronic left lower extremity lymphedema, status post thigh mass resection, presents with complaint increasing redness and swelling to left lower extremity, generalized weakness, malaise, dizziness  Hx diabetes, hypertension, dyslipidemia, CAD, morbid obesity, diabetic neuropathy   Presents with erythematous, warm, and tender to touch at the left lower and upper leg (see image below)  Placed in observation status  Started on IVABT & IVF  Observation to IP admission 6/14/22:  IVABT in progress for cellulitis LLE  Persistent swelling noted LLE, pain managed on IV Dilaudid  IVF maintained for CLOVIS with creatinine up to 1 85      ED Triage Vitals   Temperature Pulse Respirations Blood Pressure SpO2   06/13/22 0953 06/13/22 0953 06/13/22 0953 06/13/22 0953 06/13/22 0953   (!) 97 °F (36 1 °C) 86 20 124/80 94 %      Temp Source Heart Rate Source Patient Position - Orthostatic VS BP Location FiO2 (%)   06/13/22 0953 06/13/22 1224 06/13/22 0953 06/13/22 0953 --   Temporal Monitor Sitting Right arm       Pain Score       06/13/22 1200       10 - Worst Possible Pain          Wt Readings from Last 1 Encounters:   06/13/22 (!) 167 kg (368 lb 2 7 oz)     Additional Vital Signs:   Date/Time Temp Pulse Resp BP MAP (mmHg) SpO2 Calculated FIO2 (%) - Nasal Cannula Nasal Cannula O2 Flow Rate (L/min) O2 Device Patient Position - Orthostatic VS   06/14/22 03:13:34 98 °F (36 7 °C) 66 18 111/63 79 92 % -- -- -- --   06/13/22 2342 -- -- -- -- -- -- 28 2 L/min Nasal cannula --   06/13/22 2241 97 4 °F (36 3 °C) Abnormal  66 18 121/87 -- 94 % -- -- -- --   06/13/22 22:15:17 97 4 °F (36 3 °C) Abnormal  69 -- 121/87 98 95 % -- -- -- --   06/13/22 19:55:19 97 7 °F (36 5 °C) 66 -- 96/65 75 92 % -- -- -- --   06/13/22 19:39:03 98 °F (36 7 °C) 67 -- -- -- 92 % -- -- -- --   06/13/22 17:18:26 -- 71 -- 123/74 90 95 % -- -- -- --   06/13/22 17:04:38 98 1 °F (36 7 °C) 69 19 92/49 Abnormal  63 97 % -- -- -- --   06/13/22 16:46:45 97 9 °F (36 6 °C) 78 -- 91/46 Abnormal  61 97 % -- -- -- --   06/13/22 1530 -- 74 18 117/58 77 92 % -- -- None (Room air) Lying       Pertinent Labs/Diagnostic Test Results:   VAS lower limb venous duplex study, unilateral/limited   Final Result  (06/14 1834)   RIGHT LOWER LIMB LIMITED:  Evaluation shows no evidence of thrombus in the common femoral vein  Doppler evaluation shows a normal response to augmentation maneuvers  LEFT LOWER LIMB:  No evidence of acute or chronic deep vein thrombosis  No evidence of superficial thrombophlebitis noted  Doppler evaluation shows a normal response to augmentation maneuvers  Popliteal, posterior tibial and anterior tibial arterial Doppler waveforms are  triphasic    Technically difficult study     Results from last 7 days   Lab Units 06/13/22  1155   SARS-COV-2  Negative     Results from last 7 days   Lab Units 06/14/22  0543 06/13/22  1153   WBC Thousand/uL 6 35 8 26   HEMOGLOBIN g/dL 9 8* 11 7*   HEMATOCRIT % 32 0* 36 8   PLATELETS Thousands/uL 238 274   NEUTROS ABS Thousands/µL 4 58 6 66     Results from last 7 days   Lab Units 06/14/22  0543 06/13/22  1153   SODIUM mmol/L 135* 136   POTASSIUM mmol/L 3 9 3 8   CHLORIDE mmol/L 100 99*   CO2 mmol/L 27 27   ANION GAP mmol/L 8 10   BUN mg/dL 29* 18   CREATININE mg/dL 1 85* 1 41*   EGFR ml/min/1 73sq m 44 61   CALCIUM mg/dL 7 9* 8 4   MAGNESIUM mg/dL  --  1 6     Results from last 7 days   Lab Units 06/13/22  1153   AST U/L 21   ALT U/L 18   ALK PHOS U/L 98   TOTAL PROTEIN g/dL 7 9   ALBUMIN g/dL 2 5*   TOTAL BILIRUBIN mg/dL 0 40     Results from last 7 days   Lab Units 06/14/22  1134 06/14/22  0719 06/13/22  2052 06/13/22  1702   POC GLUCOSE mg/dl 157* 108 120 94     Results from last 7 days   Lab Units 06/14/22  0543 06/13/22  1153   GLUCOSE RANDOM mg/dL 106 131     Results from last 7 days   Lab Units 06/13/22  1153   PROTIME seconds 14 2   INR  1 12   PTT seconds 38*     Results from last 7 days   Lab Units 06/13/22  1153   LACTIC ACID mmol/L 0 9     Results from last 7 days   Lab Units 06/13/22  1153   CRP mg/L 404 1*     Results from last 7 days   Lab Units 06/13/22  1155   INFLUENZA A PCR  Negative   INFLUENZA B PCR  Negative   RSV PCR  Negative     Results from last 7 days   Lab Units 06/13/22  1211 06/13/22  1153 BLOOD CULTURE  No Growth at 24 hrs  No Growth at 24 hrs       ED Treatment:   Medication Administration from 06/13/2022 0907 to 06/13/2022 1617       Date/Time Order Dose Route Action     06/13/2022 1212 clindamycin (CLEOCIN) IVPB (premix in dextrose) 600 mg 50 mL 600 mg Intravenous New Bag     06/13/2022 1200 ketorolac (TORADOL) injection 15 mg 15 mg Intravenous Given     06/13/2022 1200 ondansetron (ZOFRAN) injection 4 mg 4 mg Intravenous Given     06/13/2022 1302 sodium chloride 0 9 % bolus 1,000 mL 1,000 mL Intravenous New Bag     06/13/2022 1302 morphine injection 4 mg 4 mg Intravenous Given     06/13/2022 1419 oxyCODONE-acetaminophen (PERCOCET) 5-325 mg per tablet 1 tablet 1 tablet Oral Given        Past Medical History:   Diagnosis Date    Arthritis     Breathing difficulty     Coronary artery disease     Diabetes mellitus ([facility] )     Diabetic neuropathy ([place] [address] ) 5/28/2021    Heart disease     CAD   s/p ptca with 1 stent 2012    Hidradenitis suppurativa     groin    History of transfusion     Hyperlipidemia     Hypertension     MI [facility]ocardial infarction) ([facility] )     " silent " M I  in the past    Morbid obesity with BMI of 50 0-59 9, adult ([facility] )     Nicotine dependence     Obesity     Pilonidal cyst      Present on Admission:   Cellulitis of left lower extremity   Type 2 diabetes mellitus with diabetic polyneuropathy, without long-term current use of insulin ([facility])   History of hidradenitis suppurativa   Essential hypertension   Anemia   Nocturnal hypoxia    Admitting Diagnosis: Cellulitis [L03 90]  Weakness [R53 1]  Leg swelling [M79 89]  CLOVIS (acute kidney injury) ([facility] ) [N17 9]  Age/Sex: 40 y o  male  Admission Orders:  Contact & airborne isolation  accuchecks  Scd/foot pumps  Pt/ot eval & tx    Scheduled Medications:  amLODIPine, 10 mg, Oral, Daily  aspirin, 81 mg, Oral, Daily  atorvastatin, 80 mg, Oral, Daily With Dinner  carvedilol, 6 25 mg, Oral, BID With Meals  cefazolin, 2,000 mg, Intravenous, Q8H  enoxaparin, 40 mg, Subcutaneous, Daily  fish oil, 1,000 mg, Oral, BID  insulin lispro, 2-12 Units, Subcutaneous, 4x Daily (AC & HS)  zinc sulfate, 220 mg, Oral, Daily    Continuous IV Infusions:  sodium chloride, 150 mL/hr, Intravenous, Continuous    PRN Meds:  acetaminophen, 650 mg, Oral, Q6H PRN  albuterol, 2 puff, Inhalation, Q6H PRN  HYDROmorphone, 0 5 mg, Intravenous, Q6H PRN, 6/13 x1, 6/14 x2    Network Utilization Review Department  ATTENTION: Please call with any questions or concerns to [phone] and carefully listen to the prompts so that you are directed to the right person  All voicemails are confidential   Leanne Marr all requests for admission clinical reviews, approved or denied determinations and any other requests to dedicated fax number below belonging to the campus where the patient is receiving treatment   List of dedicated fax numbers for the Facilities:  [address] DENIALS (Administrative/Medical Necessity) [phone]   [address] (Maternity/NICU/Pediatrics) [phone]   [address]  [address] [facility] [place] [address] [address] [place] [address] [address] [facility] [facility] [phone]

## 2022-06-14 NOTE — OCCUPATIONAL THERAPY NOTE
OT EVALUATION     06/14/22 1340   Note Type   Note type Evaluation   Restrictions/Precautions   Other Precautions Pain; Fall Risk   Pain Assessment   Pain Assessment Tool 0-10   Pain Score 7   Pain Location/Orientation Orientation: Left; Location: Leg   Home Living   Type of Home House  (1/2 duplex)   Home Layout Two level;1/2 bath on main level;Bed/bath upstairs   Bathroom Shower/Tub Tub/shower unit   Bathroom Toilet Standard   Bathroom Equipment Grab bars in shower;Grab bars around toilet   [address] Walker;Crutches   Prior Function   Level of [place] Independent with ADLs and functional mobility   Lives With Son   Receives Help From Family   ADL Assistance Independent   IADLs Independent   Falls in the last 6 months 0   Vocational Full time employment  ([employer])   Comments Patient admitted with left calf pain and increased swelling from baseline   Subjective   Subjective "I am more mobile then earlier"  (Patient educated on lymphedema management through [employer]  Pt was very grateful for this knowledge and plans on following up with PCP in regards to this type of care )   ADL   Eating Assistance 7  Independent   Grooming Assistance 7  Independent   UB Bathing Assistance 7  Independent   [address] 4  [address] 4  [address]   5  [address] [facility] 5  Supervision/Setup   Additional Comments Patient independent to bhanu/doff R sock; min assist to bhanu L sock due to increased edema and limited flexability     Bed Mobility   Additional Comments Not tested, patient was seated edge of bed upon entrance of the room   Transfers   Sit to Stand 5  Supervision   Stand to Sit 5  Supervision   Toilet transfer 5  Supervision   Additional Comments Patient transferred on/off standard toilet with supervision without adaptive equipment   Functional Mobility   Functional Mobility 5 Supervision   Additional Comments Patient ambulated functional household distance to/from bathroom with supervision and no assistive device   Balance   Static Sitting Fair +   Dynamic Sitting Fair +   Static Standing Fair +   Dynamic Standing Fair   Activity Tolerance   Activity Tolerance Patient tolerated treatment well;Patient limited by pain   RUE Assessment   RUE Assessment WFL   LUE Assessment   LUE Assessment WFL   Cognition   Overall Cognitive Status WFL   Arousal/Participation Alert; Responsive; Cooperative   Attention Within functional limits   Orientation Level Oriented X4   Following Commands Follows all commands and directions without difficulty   Assessment   Limitation Decreased ADL status; Decreased Safe judgement during ADL;Decreased endurance;Decreased self-care trans;Decreased high-level ADLs   Prognosis Good   Assessment Patient evaluated by Occupational Therapy  Patient admitted with Cellulitis of left lower extremity  The patients occupational profile, medical and therapy history includes a extensive additional review of physical, cognitive, or psychosocial history related to current functional performance  Comorbidities affecting functional mobility and ADLS include: arthritis, diabetes, hypertension, hyperlipidemia and obesity  Prior to admission, patient was independent with ADLS and independent with IADLS  The evaluation identifies the following performance deficits: weakness, impaired balance, new onset of impairment of functional mobility, decreased ADLS, decreased IADLS, pain, decreased activity tolerance and decreased strength, that result in activity limitations and/or participation restrictions  This evaluation requires clinical decision making of moderate complexity, because the patient may present with comorbidities that affect occupational performance and required minimal or moderate modification of tasks or assistance with the consideration of several treatment options    The Barthel Index was used as a functional outcome tool presenting with a score of Barthel Index Score: 85, indicating minimal limitations of functional mobility and ADLS  The patient's raw score on the AM-PAC Daily Activity inpatient short form is 22, standardized score is 47 1, greater than 39 4  Patients at this level are likely to benefit from DC to home  Please refer to the recommendation of the Occupational Therapist for safe DC planning  Patient will benefit from skilled Occupational Therapy services to address above deficits and facilitate a safe return to prior level of function  PT evaluation deffered as patient ambulates supervision to independent  Goals   Patient Goals for swelling/pain to reduce   STG Time Frame   (1-7 days)   Short Term Goal  Goals established to promote Patient Goals: for swelling/pain to reduce:  Patient will increase standing tolerance to 10 minutes during ADL task to decrease assistance level and decrease fall risk; Patient will increase bed mobility to independent in preparation for ADLS and transfers; Patient will increase functional mobility to and from bathroom with no assistive device independently to increase performance with ADLS and to use a toilet;Patient will improve functional activity tolerance to 10 minutes of sustained functional tasks to increase participation in basic self-care and decrease assistance level;  Patient will be able to to verbalize understanding and perform energy conservation/proper body mechanics during ADLS and functional mobility at least 75% of the time with minimal cueing to decrease signs of fatigue and increase stamina to return to prior level of function; Patient will increase static/dynamic sitting balance to good to improve the ability to sit at edge of bed or on a chair for ADLS;  Patient will increase dynamic standing balance to fair+ to improve postural stability and decrease fall risk during standing ADLS and transfers     LTG Time Frame (8-14 days)   Long Term Goal Patient will increase standing tolerance to 20 minutes during ADL task to decrease assistance level and decrease fall risk; Patient will improve functional activity tolerance to 20 minutes of sustained functional tasks to increase participation in basic self-care and decrease assistance level;  Patient will be able to to verbalize understanding and perform energy conservation/proper body mechanics during ADLS and functional mobility at least 90% of the time with no cueing to decrease signs of fatigue and increase stamina to return to prior level of function;  Patient will increase static/dynamic standing balance to good to improve postural stability and decrease fall risk during standing ADLS and transfers  Pt will score >/= 24/24 on AM-PAC Daily Activity Inpatient scale to promote safe independence with ADLs and functional mobility; Pt will score >/= 100/100 on Barthel Index in order to decrease caregiver assistance needed and increase ability to perform ADLs and functional mobility  Functional Transfer Goals   Pt Will Perform All Functional Transfers   (STG independent)   ADL Goals   Pt Will Perform Bathing   (STG supervision LTG independent)   Pt Will Perform LE Dressing   (STG supervision LTG independent)   Pt Will Perform Toileting   (STG independent)   Plan   Treatment Interventions ADL retraining;Functional transfer training;Patient/family training;Equipment evaluation/education; Energy conservation; Activityengagement   Goal Expiration Date 06/28/22   OT Frequency 3-5x/wk   Recommendation   OT Discharge Recommendation Home with outpatient rehabilitation  (Lymphedema managment)   AM-PAC Daily Activity Inpatient   Lower Body Dressing 3   Bathing 3   Toileting 4   Upper Body Dressing 4   Grooming 4   Eating 4   Daily Activity Raw Score 22   Daily Activity Standardized Score (Calc for Raw Score >=11) 47  1   AM-PAC Applied Cognition Inpatient   Following a Speech/Presentation 4 Understanding Ordinary Conversation 4   Taking Medications 4   Remembering Where Things Are Placed or Put Away 4   Remembering List of 4-5 Errands 4   Taking Care of Complicated Tasks 4   Applied Cognition Raw Score 24   Applied Cognition Standardized Score 62 21   Barthel Index   Feeding 10   Bathing 0   Grooming Score 5   Dressing Score 5   Bladder Score 10   Bowels Score 10   Toilet Use Score 10   Transfers (Bed/Chair) Score 15   Mobility (Level Surface) Score 15   Stairs Score 5   Barthel Index Score 85   Licensure   [place] License Number  [employer], OTS

## 2022-06-14 NOTE — PHYSICAL THERAPY NOTE
06/14/22 1500   Note Type   Note type Screen   Additional Comments Pt is independently ambulatory and at his baseline level of function per OT  Recommend pt ambulate ad ariela, no skilled PT needs noted at this time

## 2022-06-14 NOTE — PLAN OF CARE
Problem: Potential for Falls  Goal: Patient will remain free of falls  Description: INTERVENTIONS:  - Educate patient/family on patient safety including physical limitations  - Instruct patient to call for assistance with activity   - Consult OT/PT to assist with strengthening/mobility   - Keep Call bell within reach  - Keep bed low and locked with side rails adjusted as appropriate  - Keep care items and personal belongings within reach  - Initiate and maintain comfort rounds  - Make Fall Risk Sign visible to staff  - Apply yellow socks and bracelet for high fall risk patients  - Consider moving patient to room near nurses station  Outcome: Progressing     Problem: INFECTION - ADULT  Goal: Absence or prevention of progression during hospitalization  Description: INTERVENTIONS:  - Assess and monitor for signs and symptoms of infection  - Monitor lab/diagnostic results  - Monitor all insertion sites, i e  indwelling lines, tubes, and drains  - Liberal appropriate cooling/warming therapies per order  - Administer medications as ordered  - Instruct and encourage patient and family to use good hand hygiene technique  - Identify and instruct in appropriate isolation precautions for identified infection/condition  Outcome: Progressing  Goal: Absence of fever/infection during neutropenic period  Description: INTERVENTIONS:  - Monitor WBC    Outcome: Progressing     Problem: SAFETY ADULT  Goal: Patient will remain free of falls  Description: INTERVENTIONS:  - Educate patient/family on patient safety including physical limitations  - Instruct patient to call for assistance with activity   - Consult OT/PT to assist with strengthening/mobility   - Keep Call bell within reach  - Keep bed low and locked with side rails adjusted as appropriate  - Keep care items and personal belongings within reach  - Initiate and maintain comfort rounds  - Make Fall Risk Sign visible to staff  - Apply yellow socks and bracelet for high fall risk patients  - Consider moving patient to room near nurses station  Outcome: Progressing     Problem: Knowledge Deficit  Goal: Patient/family/caregiver demonstrates understanding of disease process, treatment plan, medications, and discharge instructions  Description: Complete learning assessment and assess knowledge base    Interventions:  - Provide teaching at level of understanding  - Provide teaching via preferred learning methods  Outcome: Progressing

## 2022-06-14 NOTE — PROGRESS NOTES
[address] [facility] Practice Progress Note - Raquel Drain 40 y o  male MRN: [id number]    Unit/Bed#: [address] [place] [address] Encounter: [id number]    24hr events: On day 2 of Ancef for cellulitis  Persistent erythema and swelling of the left lower leg, but left anterior thigh swelling and erythema has improved  Pain is managed with current pain medications  Continue with IV Fluid hydration for CLOVIS management    IVF: sodium chloride, Last Rate: 150 mL/hr (06/14/22 0659)  Electrolytes:  Replete PRN  Diet:  Calorie controlled level to with low-sodium  DVT prophylaxis:  Lovenox 40 subQ daily and SCD    Assessment/Plan:    * Cellulitis of left lower extremity  Assessment & Plan  Persistent erythema and swelling of the left lower leg, but left anterior thigh swelling and erythema has improved  No open wounds or point of infection noted on physical exam  · Given Cipro 600 mg in the ED  · Started on Ancef 2 g q 8 hours  · Monitor lines of demarcation and fever curve  · VAS showed no evidence of acute or chronic DVTs  · Pending blood culture results  · Pain management with Tylenol p r n   For mild to moderate, and Dilaudid 0 5 mg for severe pain    CLOVIS (acute kidney injury) ([facility] [address] )  Assessment & Plan  · Elevated creatinine on admission at 1 41 in the setting of vomiting about 2 days prior to admission  · No prior H/O CKD, and baseline CR appears to be within normal limit  · Hold nephrotoxins including lisinopril and metformin  · Continue with IVF hydration  · Monitor BMP daily    Type 2 diabetes mellitus with diabetic polyneuropathy, without long-term current use of insulin [facility])  Assessment & Plan  Lab Results   Component Value Date    HGBA1C 7 5 (H) 03/30/2022       Recent Labs     06/13/22  1702 06/13/22 2052 06/14/22  0719   POCGLU 94 120 108       Blood Sugar Average: Last 72 hrs:  (P) 288 [id number]   Hold home Victoza metformin  ISS alg 4, and monitor serum glucose      History of hidradenitis suppurativa  Assessment & Plan  Patient has history of hidranitis suppurativa with wounds currently present on left groin and abdominal pannus    · Local wound care  · Antibiotics as noted     History of coronary angioplasty with insertion of stent  Assessment & Plan  H/o cardiac catheterization in 2010 and 2020  Stents placed in [facility] in 2020, 2 total   Stable  Continue on ASA and statin    Anemia  Assessment & Plan  Chronic and stable anemia with low normal MCV  Continue to monitor CBC    Nocturnal hypoxia  Assessment & Plan  Adequate oxygenation on room air during admission  Patient has H/O significant nocturnal hypoxia with recorded lowest desaturation of 59% on prior admissions  Patient was recommended to follow up with pulmonology for outpatient sleep study, but he has not done yet  He was also found to require oxygen upon exertion and was discharged with 2 L of O2 supplement for home, but patient was noncompliant with it  · Oxygen as needed overnight  · Monitor respiratory status   · Continue to encourage f/u outpatient for sleep study    Essential hypertension  Assessment & Plan  Stable  Continue on home doses of amlodipine and carvedilol  Discontinue lisinopril in the setting of CLOVIS  Monitor blood pressure      ---------------------------------------------------------------------------------------  Code Status: Level 1 - Full Code  ---------------------------------------------------------------------------------------    Subjective:   Patient was seen and examined by bedside today  Breathing comfortably on room air  Pain well controlled on Dilaudid 0 5mg q6h PRN, last received 0530  Pain rated a 6/10 now  Reports new onset painful lesion in L groin  Pain described as a "similar to a pulled muscle"  Denies abdominal pain, urinary changes, bowel changes, fever, HA  Review of Systems   Constitutional: Negative for fatigue and fever  Eyes: Negative for visual disturbance     Respiratory: Negative for cough and shortness of breath  Cardiovascular: Negative for chest pain  Gastrointestinal: Negative for abdominal pain, nausea and vomiting  Genitourinary: Negative for decreased urine volume, dysuria, flank pain, frequency and hematuria  Musculoskeletal: Negative for back pain  Skin: Negative for color change  Neurological: Negative for weakness and numbness  Psychiatric/Behavioral: Negative for behavioral problems  Objective:   Temp:  [97 4 °F (36 3 °C)-98 1 °F (36 7 °C)] 98 1 °F (36 7 °C)  HR:  [66-78] 74  Resp:  [18-20] 18  BP: ()/(46-97) 150/97  SpO2:  [90 %-97 %] 90 %   Intake/Output Summary (Last 24 hours) at 6/14/2022 1038  Last data filed at 6/13/2022 1442  Gross per 24 hour   Intake 1000 ml   Output --   Net 1000 ml      Nasal Cannula O2 Flow Rate (L/min): 2 L/min Wt Readings from Last 3 Encounters:   06/13/22 (!) 167 kg (368 lb 2 7 oz)   05/04/22 (!) 165 kg (363 lb)   05/02/22 (!) 174 kg (383 lb)        Physical Exam  Constitutional:       General: He is in acute distress  Appearance: Normal appearance  He is obese  HENT:      Head: Normocephalic and atraumatic  Eyes:      Pupils: Pupils are equal, round, and reactive to light  Cardiovascular:      Rate and Rhythm: Normal rate and regular rhythm  Pulmonary:      Effort: Pulmonary effort is normal  No respiratory distress  Breath sounds: Normal breath sounds  Abdominal:      General: Bowel sounds are normal       Palpations: Abdomen is soft  Tenderness: There is no abdominal tenderness  Genitourinary:     Comments: Pea sized, firm, tender mass located on L groin just superior to chronic wounds   Musculoskeletal:         General: Swelling and tenderness present  Comments: Swelling, redness and warmth over LLE, mildly improved from yesterday    Skin:     General: Skin is dry  Findings: Erythema present  Comments: Erythema over LLE    Neurological:      General: No focal deficit present        Mental Status: He is alert and oriented to person, place, and time  Psychiatric:         Mood and Affect: Mood normal          Behavior: Behavior normal          Laboratory   Results from last 7 days   Lab Units 06/14/22  0543 06/13/22  1153   WBC Thousand/uL 6 35 8 26   HEMOGLOBIN g/dL 9 8* 11 7*   HEMATOCRIT % 32 0* 36 8   PLATELETS Thousands/uL 238 274   NEUTROS PCT % 72 81*   MONOS PCT % 10 8     Results from last 7 days   Lab Units 06/14/22  0543 06/13/22  1153   SODIUM mmol/L 135* 136   POTASSIUM mmol/L 3 9 3 8   CHLORIDE mmol/L 100 99*   CO2 mmol/L 27 27   ANION GAP mmol/L 8 10   BUN mg/dL 29* 18   CREATININE mg/dL 1 85* 1 41*   CALCIUM mg/dL 7 9* 8 4   GLUCOSE RANDOM mg/dL 106 131   ALT U/L  --  18   AST U/L  --  21   ALK PHOS U/L  --  98   ALBUMIN g/dL  --  2 5*   TOTAL BILIRUBIN mg/dL  --  0 40     Results from last 7 days   Lab Units 06/13/22  1153   MAGNESIUM mg/dL 1 6      Results from last 7 days   Lab Units 06/13/22  1153   INR  1 12   PTT seconds 38*          Results from last 7 days   Lab Units 06/13/22  1153   LACTIC ACID mmol/L 0 9     ABG:    VBG:          Micro  Results from last 7 days   Lab Units 06/13/22  1211 06/13/22  1153   BLOOD CULTURE  Received in Microbiology Lab  Culture in Progress  Received in Microbiology Lab  Culture in Progress  Diagnostic Imaging / Data: I have personally reviewed pertinent reports        Medications:    MED PRN IV   amLODIPine, 10 mg, Oral, Daily  aspirin, 81 mg, Oral, Daily  atorvastatin, 80 mg, Oral, Daily With Dinner  carvedilol, 6 25 mg, Oral, BID With Meals  cefazolin, 2,000 mg, Intravenous, Q8H  enoxaparin, 40 mg, Subcutaneous, Daily  fish oil, 1,000 mg, Oral, BID  insulin lispro, 2-12 Units, Subcutaneous, 4x Daily (AC & HS)  zinc sulfate, 220 mg, Oral, Daily     acetaminophen, 650 mg, Q6H PRN  albuterol, 2 puff, Q6H PRN  HYDROmorphone, 0 5 mg, Q6H PRN     sodium chloride, Last Rate: 150 mL/hr (06/14/22 2233)           Invasive Devices  Report    Peripheral Intravenous Line  Duration           Peripheral IV 06/14/22 Right;Ventral (anterior) Forearm <1 day                  Azul Cordova MD    Portions of the record may have been created with voice recognition software  Occasional wrong word or "sound a like" substitutions may have occurred due to the inherent limitations of voice recognition software  Read the chart carefully and recognize, using context, where substitutions have occurred      Plan was discussed and in agreement with patient     Discussed with Dr Brody Douglass

## 2022-06-15 VITALS
OXYGEN SATURATION: 93 % | HEIGHT: 70 IN | WEIGHT: 315 LBS | BODY MASS INDEX: 45.1 KG/M2 | SYSTOLIC BLOOD PRESSURE: 148 MMHG | DIASTOLIC BLOOD PRESSURE: 81 MMHG | TEMPERATURE: 97.8 F | RESPIRATION RATE: 18 BRPM | HEART RATE: 65 BPM

## 2022-06-15 DIAGNOSIS — L03.90 CELLULITIS: Primary | ICD-10-CM

## 2022-06-15 PROBLEM — I89.0 LYMPHEDEMA OF BOTH LOWER EXTREMITIES: Status: ACTIVE | Noted: 2022-06-15

## 2022-06-15 PROBLEM — N17.9 AKI (ACUTE KIDNEY INJURY) (HCC): Status: RESOLVED | Noted: 2021-02-22 | Resolved: 2022-06-15

## 2022-06-15 LAB
ANION GAP SERPL CALCULATED.3IONS-SCNC: 10 MMOL/L (ref 4–13)
BASOPHILS # BLD AUTO: 0.01 THOUSANDS/ΜL (ref 0–0.1)
BASOPHILS NFR BLD AUTO: 0 % (ref 0–1)
BUN SERPL-MCNC: 28 MG/DL (ref 5–25)
CALCIUM SERPL-MCNC: 8.5 MG/DL (ref 8.3–10.1)
CHLORIDE SERPL-SCNC: 101 MMOL/L (ref 100–108)
CO2 SERPL-SCNC: 28 MMOL/L (ref 21–32)
CREAT SERPL-MCNC: 1.23 MG/DL (ref 0.6–1.3)
EOSINOPHIL # BLD AUTO: 0.15 THOUSAND/ΜL (ref 0–0.61)
EOSINOPHIL NFR BLD AUTO: 3 % (ref 0–6)
ERYTHROCYTE [DISTWIDTH] IN BLOOD BY AUTOMATED COUNT: 17.7 % (ref 11.6–15.1)
GFR SERPL CREATININE-BSD FRML MDRD: 72 ML/MIN/1.73SQ M
GLUCOSE SERPL-MCNC: 125 MG/DL (ref 65–140)
GLUCOSE SERPL-MCNC: 138 MG/DL (ref 65–140)
GLUCOSE SERPL-MCNC: 144 MG/DL (ref 65–140)
HCT VFR BLD AUTO: 34.2 % (ref 36.5–49.3)
HGB BLD-MCNC: 10.6 G/DL (ref 12–17)
IMM GRANULOCYTES # BLD AUTO: 0.06 THOUSAND/UL (ref 0–0.2)
IMM GRANULOCYTES NFR BLD AUTO: 1 % (ref 0–2)
LYMPHOCYTES # BLD AUTO: 0.98 THOUSANDS/ΜL (ref 0.6–4.47)
LYMPHOCYTES NFR BLD AUTO: 16 % (ref 14–44)
MAGNESIUM SERPL-MCNC: 2.1 MG/DL (ref 1.6–2.6)
MCH RBC QN AUTO: 26 PG (ref 26.8–34.3)
MCHC RBC AUTO-ENTMCNC: 31 G/DL (ref 31.4–37.4)
MCV RBC AUTO: 84 FL (ref 82–98)
MONOCYTES # BLD AUTO: 0.52 THOUSAND/ΜL (ref 0.17–1.22)
MONOCYTES NFR BLD AUTO: 9 % (ref 4–12)
NEUTROPHILS # BLD AUTO: 4.32 THOUSANDS/ΜL (ref 1.85–7.62)
NEUTS SEG NFR BLD AUTO: 71 % (ref 43–75)
NRBC BLD AUTO-RTO: 0 /100 WBCS
PLATELET # BLD AUTO: 269 THOUSANDS/UL (ref 149–390)
PMV BLD AUTO: 9.2 FL (ref 8.9–12.7)
POTASSIUM SERPL-SCNC: 4.3 MMOL/L (ref 3.5–5.3)
RBC # BLD AUTO: 4.07 MILLION/UL (ref 3.88–5.62)
SODIUM SERPL-SCNC: 139 MMOL/L (ref 136–145)
WBC # BLD AUTO: 6.04 THOUSAND/UL (ref 4.31–10.16)

## 2022-06-15 PROCEDURE — 85025 COMPLETE CBC W/AUTO DIFF WBC: CPT | Performed by: STUDENT IN AN ORGANIZED HEALTH CARE EDUCATION/TRAINING PROGRAM

## 2022-06-15 PROCEDURE — 80048 BASIC METABOLIC PNL TOTAL CA: CPT | Performed by: STUDENT IN AN ORGANIZED HEALTH CARE EDUCATION/TRAINING PROGRAM

## 2022-06-15 PROCEDURE — 82948 REAGENT STRIP/BLOOD GLUCOSE: CPT

## 2022-06-15 PROCEDURE — NC001 PR NO CHARGE: Performed by: STUDENT IN AN ORGANIZED HEALTH CARE EDUCATION/TRAINING PROGRAM

## 2022-06-15 PROCEDURE — 99238 HOSP IP/OBS DSCHRG MGMT 30/<: CPT | Performed by: STUDENT IN AN ORGANIZED HEALTH CARE EDUCATION/TRAINING PROGRAM

## 2022-06-15 PROCEDURE — 83735 ASSAY OF MAGNESIUM: CPT | Performed by: STUDENT IN AN ORGANIZED HEALTH CARE EDUCATION/TRAINING PROGRAM

## 2022-06-15 RX ORDER — OXYCODONE HYDROCHLORIDE AND ACETAMINOPHEN 5; 325 MG/1; MG/1
1 TABLET ORAL EVERY 8 HOURS PRN
Qty: 10 TABLET | Refills: 0 | Status: SHIPPED | OUTPATIENT
Start: 2022-06-15 | End: 2022-06-15

## 2022-06-15 RX ORDER — LISINOPRIL 20 MG/1
20 TABLET ORAL DAILY
Status: DISCONTINUED | OUTPATIENT
Start: 2022-06-15 | End: 2022-06-15 | Stop reason: HOSPADM

## 2022-06-15 RX ORDER — CEPHALEXIN 250 MG/1
500 CAPSULE ORAL 4 TIMES DAILY
Qty: 40 CAPSULE | Refills: 0 | Status: SHIPPED | OUTPATIENT
Start: 2022-06-15 | End: 2022-06-20

## 2022-06-15 RX ADMIN — OMEGA-3 FATTY ACIDS CAP 1000 MG 1000 MG: 1000 CAP at 08:06

## 2022-06-15 RX ADMIN — HYDROMORPHONE HYDROCHLORIDE 0.2 MG: 1 INJECTION, SOLUTION INTRAMUSCULAR; INTRAVENOUS; SUBCUTANEOUS at 11:20

## 2022-06-15 RX ADMIN — OXYCODONE HYDROCHLORIDE AND ACETAMINOPHEN 1 TABLET: 5; 325 TABLET ORAL at 04:56

## 2022-06-15 RX ADMIN — CEFAZOLIN SODIUM 2000 MG: 2 SOLUTION INTRAVENOUS at 11:54

## 2022-06-15 RX ADMIN — OXYCODONE HYDROCHLORIDE AND ACETAMINOPHEN 1 TABLET: 5; 325 TABLET ORAL at 09:57

## 2022-06-15 RX ADMIN — ENOXAPARIN SODIUM 40 MG: 40 INJECTION SUBCUTANEOUS at 08:06

## 2022-06-15 RX ADMIN — AMLODIPINE BESYLATE 10 MG: 10 TABLET ORAL at 08:06

## 2022-06-15 RX ADMIN — HYDROMORPHONE HYDROCHLORIDE 0.2 MG: 1 INJECTION, SOLUTION INTRAMUSCULAR; INTRAVENOUS; SUBCUTANEOUS at 00:54

## 2022-06-15 RX ADMIN — HYDROMORPHONE HYDROCHLORIDE 0.2 MG: 1 INJECTION, SOLUTION INTRAMUSCULAR; INTRAVENOUS; SUBCUTANEOUS at 07:15

## 2022-06-15 RX ADMIN — CARVEDILOL 6.25 MG: 6.25 TABLET, FILM COATED ORAL at 07:15

## 2022-06-15 RX ADMIN — CEFAZOLIN SODIUM 2000 MG: 2 SOLUTION INTRAVENOUS at 04:55

## 2022-06-15 RX ADMIN — LISINOPRIL 20 MG: 20 TABLET ORAL at 08:54

## 2022-06-15 RX ADMIN — ASPIRIN 81 MG: 81 TABLET, COATED ORAL at 08:06

## 2022-06-15 RX ADMIN — ZINC SULFATE 220 MG (50 MG) CAPSULE 220 MG: CAPSULE at 08:06

## 2022-06-15 NOTE — DISCHARGE INSTR - AVS FIRST PAGE
Please complete Keflex 500 mg 4 times a day for 5 days  Follow-up with lymphedema Clinic, which is inside the physical therapy at [address] [place] [address] warm compress on the sebaceous cyst on your right temple  Follow-up with [facility] in 1 week

## 2022-06-15 NOTE — PLAN OF CARE
Problem: Potential for Falls  Goal: Patient will remain free of falls  Description: INTERVENTIONS:  - Educate patient/family on patient safety including physical limitations  - Instruct patient to call for assistance with activity   - Consult OT/PT to assist with strengthening/mobility   - Keep Call bell within reach  - Keep bed low and locked with side rails adjusted as appropriate  - Keep care items and personal belongings within reach  - Initiate and maintain comfort rounds  - Make Fall Risk Sign visible to staff  - Apply yellow socks and bracelet for high fall risk patients  - Consider moving patient to room near nurses station  Outcome: Progressing     Problem: PAIN - ADULT  Goal: Verbalizes/displays adequate comfort level or baseline comfort level  Description: Interventions:  - Encourage patient to monitor pain and request assistance  - Assess pain using appropriate pain scale  - Administer analgesics based on type and severity of pain and evaluate response  - Implement non-pharmacological measures as appropriate and evaluate response  - Consider cultural and social influences on pain and pain management  - Notify physician/advanced practitioner if interventions unsuccessful or patient reports new pain  Outcome: Progressing     Problem: INFECTION - ADULT  Goal: Absence or prevention of progression during hospitalization  Description: INTERVENTIONS:  - Assess and monitor for signs and symptoms of infection  - Monitor lab/diagnostic results  - Monitor all insertion sites, i e  indwelling lines, tubes, and drains  - [place] appropriate cooling/warming therapies per order  - Administer medications as ordered  - Instruct and encourage patient and family to use good hand hygiene technique  - Identify and instruct in appropriate isolation precautions for identified infection/condition  Outcome: Progressing  Goal: Absence of fever/infection during neutropenic period  Description: INTERVENTIONS:  - Monitor WBC    Outcome: Progressing     Problem: SAFETY ADULT  Goal: Patient will remain free of falls  Description: INTERVENTIONS:  - Educate patient/family on patient safety including physical limitations  - Instruct patient to call for assistance with activity   - Consult OT/PT to assist with strengthening/mobility   - Keep Call bell within reach  - Keep bed low and locked with side rails adjusted as appropriate  - Keep care items and personal belongings within reach  - Initiate and maintain comfort rounds  - Make Fall Risk Sign visible to staff  - Apply yellow socks and bracelet for high fall risk patients  - Consider moving patient to room near nurses station  Outcome: Progressing  Goal: Maintain or return to baseline ADL function  Description: INTERVENTIONS:  -  Assess patient's ability to carry out ADLs; assess patient's baseline for ADL function and identify physical deficits which impact ability to perform ADLs (bathing, care of mouth/teeth, toileting, grooming, dressing, etc )  - Assess/evaluate cause of self-care deficits   - Assess range of motion  - Assess patient's mobility; develop plan if impaired  - Assess patient's need for assistive devices and provide as appropriate  - Encourage maximum independence but intervene and supervise when necessary  - Involve family in performance of ADLs  - Assess for home care needs following discharge   - Consider OT consult to assist with ADL evaluation and planning for discharge  - Provide patient education as appropriate  Outcome: Progressing  Goal: Maintains/Returns to pre admission functional level  Description: INTERVENTIONS:  - Perform BMAT or MOVE assessment daily    - Set and communicate daily mobility goal to care team and patient/family/caregiver     - Collaborate with rehabilitation services on mobility goals if consulted  - Out of bed for toileting  - Record patient progress and toleration of activity level   Outcome: Progressing     Problem: DISCHARGE PLANNING  Goal: Discharge to home or other facility with appropriate resources  Description: INTERVENTIONS:  - Identify barriers to discharge w/patient and caregiver  - Arrange for needed discharge resources and transportation as appropriate  - Identify discharge learning needs (meds, wound care, etc )  - Arrange for interpretive services to assist at discharge as needed  - Refer to Case Management Department for coordinating discharge planning if the patient needs post-hospital services based on physician/advanced practitioner order or complex needs related to functional status, cognitive ability, or social support system  Outcome: Progressing     Problem: Knowledge Deficit  Goal: Patient/family/caregiver demonstrates understanding of disease process, treatment plan, medications, and discharge instructions  Description: Complete learning assessment and assess knowledge base    Interventions:  - Provide teaching at level of understanding  - Provide teaching via preferred learning methods  Outcome: Progressing     Problem: Prexisting or High Potential for Compromised Skin Integrity  Goal: Skin integrity is maintained or improved  Description: INTERVENTIONS:  - Identify patients at risk for skin breakdown  - Assess and monitor skin integrity  - Assess and monitor nutrition and hydration status  - Monitor labs   - Assess for incontinence   - Turn and reposition patient  - Assist with mobility/ambulation  - Relieve pressure over bony prominences  - Avoid friction and shearing  - Provide appropriate hygiene as needed including keeping skin clean and dry  - Evaluate need for skin moisturizer/barrier cream  - Collaborate with interdisciplinary team   - Patient/family teaching  - Consider wound care consult   Outcome: Progressing

## 2022-06-15 NOTE — DISCHARGE SUMMARY
[employer] Discharge Summary - Medical Yas Sharif 40 y o  male MRN: [id number]    [address]  [facility] / Bed: [address] [facility] Encounter: [id number]    BRIEF OVERVIEW  Admitting Provider: Ian Marquez MD  Discharge Provider: Ian Marquez MD    Discharge To:  home    Outpatient Follow-Up: PCP, wound care, lymphedema clinic    Things to address at first follow up visit:     1  How is the cellulitis on the left leg? 2  Recommend follow-up with lymphedema clinic  3  Encourage weight loss and blood sugar control  (GLP-1 agonist previously prescribed, patient reported noncompliance  Patient is concerned about cellulitis with injection )  4  Continue with regular wound care   5  Encourage O2 supplementation and outpatient sleep study  6  Consider involving case management if there is any financial issues with getting the sleep study done  7  Check on the sebaceous cyst on the right temple of the head  Patient was instructed to apply warm compress and follow up outpatient if not improved  Labs and results pending at discharge: Blood culture @24 hr    Admission Date: 6/13/2022     Discharge Date: 6/15/2022    Primary Discharge Diagnosis  Principal Problem:    Cellulitis of left lower extremity  Active Problems:    Essential hypertension    Type 2 diabetes mellitus with diabetic polyneuropathy, without long-term current use of insulin (HCC)    CLOVIS (acute kidney injury) ([facility] [address] )    Nocturnal hypoxia    History of hidradenitis suppurativa    Anemia    History of coronary angioplasty with insertion of stent  Resolved Problems:    * No resolved hospital problems   *    Consulting Providers   N/A      DETAILS OF HOSPITAL STAY    Procedures Performed/Pertinent Test results  6/15: Hgb 10 6, Cr 1 23  6/14: Hgb 9 8, Cr 1 85 (BL 1 1)  6/13: Hgb 11 7, Cr 1 41,  1  VAS lower limb negative for acute and chronic DVT    HPI  39 yo male with PMH of morbid obesity, CAD s/p stent, HTN, DMT2 with hidradenitis suppurativa, and nocturnal hypoxia presents with left calf pain and increased swelling from baseline  Also reported generalized weakness, loss of appetite, and dizziness  Symptoms started Friday night after family dinner at the [facility]  He reported waking up in the middle of the night on Friday and vomited  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  Patient noticed worsening of swelling and 10/10 and left leg pain      ED course:  Clindamycin 600 mg, Toradol 15 mg, Zofran 4 mg, NS 1 L, morphine 4 mg, Percocet 1 tablet    Hospital Course    Cellulitis and CLOVIS  Patient was admitted to the med surg unit for his cellulitis in the left lower extremity  Patient was started on Ancef with pending blood cultures  Patient's CLOVIS on presentation was thought to be secondary to volume depletion due to vomiting reported per patient  IVF hydration with NS was started at 125 cc/hr  Patient's cellulitis began to improve however given his worsening CLOVIS, NS at 150 cc/hr was started in the AM of day #2 of admission until it was stopped later in the evening due to the fluids making his leg edema and pain return  DVT was ruled out with ultrasound  Patient was deemed stable for discharge on 6/15 following the improvement of his cellulitis and resolved CLOVIS  Nocturnal hypoxia  From prior admissions, noted to have significant nocturnal hypoxia with recorded lowest desaturation of 59%  Patient was discharged on 2 L supplemental oxygen in March, but reported noncompliance  Encourage outpatient sleep study  Physical Exam at Discharge  /81   Pulse 65   Temp 97 8 °F (36 6 °C)   Resp 18   Ht 5' 10" (1 778 m)   Wt (!) 167 kg (368 lb 9 8 oz)   SpO2 93%   BMI 52 89 kg/m²   Physical Exam  Constitutional:       General: He is not in acute distress  Appearance: Normal appearance  HENT:      Head: Normocephalic and atraumatic        Comments: Small 2 cm diameter nontender fluctuant cyst of the right temple - appears to be sebaceous cyst  Eyes:      Pupils: Pupils are equal, round, and reactive to light  Cardiovascular:      Rate and Rhythm: Normal rate and regular rhythm  Pulses: Normal pulses  Heart sounds: Normal heart sounds  No murmur heard  Pulmonary:      Effort: Pulmonary effort is normal  No respiratory distress  Breath sounds: Normal breath sounds  No wheezing  Abdominal:      General: Bowel sounds are normal       Palpations: Abdomen is soft  Musculoskeletal:      Comments: - Erythematous, tender to palpation, swelling of the posterior left lower leg   - Receding lines of erythema on the anterior left lower leg and anterior left thigh   - chronic venous stasis of left lower leg  -baseline lymphedema of the left leg   Skin:     General: Skin is warm and dry  Neurological:      General: No focal deficit present  Mental Status: He is alert and oriented to person, place, and time     Psychiatric:         Mood and Affect: Mood normal          Behavior: Behavior normal        Medications   Current Discharge Medication List          Current Discharge Medication List      CONTINUE these medications which have NOT CHANGED    Details   amLODIPine (NORVASC) 10 mg tablet Take 1 tablet (10 mg total) by mouth daily  Qty: 90 tablet, Refills: 1    Associated Diagnoses: Essential hypertension      aspirin (ECOTRIN LOW STRENGTH) 81 mg EC tablet Take 1 tablet (81 mg total) by mouth daily  Qty: 30 tablet, Refills: 0    Associated Diagnoses: CAD (coronary artery disease)      atorvastatin (LIPITOR) 80 mg tablet Take 1 tablet (80 mg total) by mouth daily  Qty: 90 tablet, Refills: 1    Associated Diagnoses: Dyslipidemia      carvedilol (COREG) 6 25 mg tablet Take 1 tablet (6 25 mg total) by mouth 2 (two) times a day with meals  Qty: 180 tablet, Refills: 1    Associated Diagnoses: Essential hypertension      lisinopril (ZESTRIL) 20 mg tablet Take 1 tablet (20 mg total) by mouth daily  Qty: 90 tablet, Refills: 1    Associated Diagnoses: Essential hypertension      metFORMIN (GLUCOPHAGE) 1000 MG tablet Take 1 tablet (1,000 mg total) by mouth 2 (two) times a day with meals  Qty: 180 tablet, Refills: 1    Associated Diagnoses: Type 2 diabetes mellitus with hyperglycemia, without long-term current use of insulin (HCC)      Omega-3 Fatty Acids (fish oil) 1,000 mg Take 1 capsule (1,000 mg total) by mouth 2 (two) times a day  Qty: 180 capsule, Refills: 2    Associated Diagnoses: Dyslipidemia      albuterol (ProAir HFA) 90 mcg/act inhaler Inhale 2 puffs every 6 (six) hours as needed for wheezing  Qty: 8 5 g, Refills: 3    Comments: Substitution to a formulary equivalent within the same pharmaceutical class is authorized  Associated Diagnoses: Shortness of breath      Insulin Pen Needle (PEN NEEDLES 29GX1/2") 29G X 12MM MISC Use 1 needle daily for subcutaneous injections  Qty: 50 each, Refills: 3    Associated Diagnoses: DM type 2 with diabetic peripheral neuropathy ([facility])      liraglutide (VICTOZA) injection Inject 0 1 mL (0 6 mg total) under the skin daily Start at 0 6 mg daily for a week, then increase to 1 2 mg daily on the second week, then increase to 1 8 mg daily on the third week and going forward  Qty: 3 mL, Refills: 3    Associated Diagnoses: DM type 2 with diabetic peripheral neuropathy ([facility] [address] );  Morbid obesity (HCC)      ticagrelor (Brilinta) 90 MG Take 1 tablet (90 mg total) by mouth every 12 (twelve) hours  Qty: 180 tablet, Refills: 2    Associated Diagnoses: Coronary artery disease, unspecified vessel or lesion type, unspecified whether angina present, unspecified whether native or transplanted heart      zinc sulfate (ZINCATE) 220 mg capsule Take 1 capsule (220 mg total) by mouth daily  Qty: 30 capsule, Refills: 0    Associated Diagnoses: Cellulitis of abdominal wall            Current Discharge Medication List      START taking these medications    Details cephalexin (KEFLEX) 250 mg capsule Take 2 capsules (500 mg total) by mouth 4 (four) times a day for 5 days  Qty: 40 capsule, Refills: 0    Associated Diagnoses: Cellulitis            Current Discharge Medication List      STOP taking these medications       oxyCODONE-acetaminophen (PERCOCET) 5-325 mg per tablet Comments:   Reason for Stopping:         ibuprofen (MOTRIN) 600 mg tablet Comments:   Reason for Stopping:              Allergies  Allergies   Allergen Reactions    Amoxicillin Hives     childhood       Diet restrictions: diabetic  Activity restrictions: as tolerated  Code Status: Level 1 - Full Code  Advance Directive and Living Will: <no information>  Power of :    POLST:      Discharge Condition: stable    Discharge  Statement   I spent 30 minutes discharging the patient  This time was spent on the day of discharge  I had direct contact with the patient on the day of discharge  Additional documentation is required if more than 30 minutes were spent on discharge

## 2022-06-15 NOTE — PROGRESS NOTES
[address] [facility] Practice Progress Note - Beth Boo 40 y o  male MRN: [id number]    Unit/Bed#: 2 [place] [address] A Encounter: [id number]    24hr events: Cellulitis of the left calf is continuing to improve  On day 2 of Ancef  Reported adequate pain management  CLOVIS resolved    IVF:  none  Electrolytes: replete PRN  Diet: Carb controlled level 2, low Na  DVT prophylaxis: SQL and SCD    Assessment/Plan:    * Cellulitis of left lower extremity  Assessment & Plan  Persistent erythema and swelling of the left lower leg, but left anterior thigh swelling and erythema has improved  No open wounds or point of infection noted on physical exam  · Given Cipro 600 mg in the ED  · Started on Ancef 2 g q 8 hours  · Monitor lines of demarcation and fever curve  · VAS showed no evidence of acute or chronic DVTs  · Following blood culture results  · Pain management with Tylenol p r n  For mild to moderate, percocet for moderate, and Dilaudid 0 2 mg for breakthrough pain    CLOVIS (acute kidney injury) (HCC)-resolved as of 6/15/2022  Assessment & Plan  · Elevated creatinine on admission at 1 41 in the setting of vomiting about 2 days prior to admission  · No prior H/O CKD, and baseline CR appears to be within normal limit  · Held nephrotoxins including lisinopril and metformin   Can continue on lisinopril since CLOVIS resolved  · Given VF hydration  · Monitor BMP daily    Type 2 diabetes mellitus with diabetic polyneuropathy, without long-term current use of insulin [facility])  Assessment & Plan  Lab Results   Component Value Date    HGBA1C 7 5 (H) 03/30/2022       Recent Labs     06/14/22  1134 06/14/22  1607 06/14/22  2136 06/15/22  0703   POCGLU 157* 126 123 144*       Blood Sugar Average: Last 72 hrs:  (P) 124 [id number]   Hold home Victoza metformin  ISS alg 4, and monitor serum glucose      History of hidradenitis suppurativa  Assessment & Plan  Patient has history of hidranitis suppurativa with wounds currently present on left groin and abdominal pannus    · Local wound care  · Antibiotics as noted     History of coronary angioplasty with insertion of stent  Assessment & Plan  H/o cardiac catheterization in 2010 and 2020  Stents placed in [facility] in 2020, 2 total   Stable  Continue on ASA and statin    Anemia  Assessment & Plan  Chronic and stable anemia with low normal MCV  Continue to monitor CBC    Nocturnal hypoxia  Assessment & Plan  Adequate oxygenation on room air during admission  Patient has H/O significant nocturnal hypoxia with recorded lowest desaturation of 59% on prior admissions  Patient was recommended to follow up with pulmonology for outpatient sleep study, but he has not done yet  He was also found to require oxygen upon exertion and was discharged with 2 L of O2 supplement for home, but patient was noncompliant with it  · Oxygen as needed overnight  · Monitor respiratory status   · Continue to encourage f/u outpatient for sleep study    Essential hypertension  Assessment & Plan  Stable  Continue on home doses of amlodipine, lisinopril and carvedilol  Monitor blood pressure      ---------------------------------------------------------------------------------------  Code Status: Level 1 - Full Code  ---------------------------------------------------------------------------------------    Subjective:   Patient was seen and examined by bedside today  No acute complaints  Persistent but improving left calf tenderness  Denies fever or chills  Review of Systems   Constitutional: Negative for chills and fever  HENT: Negative for ear pain and sore throat  Eyes: Negative for pain and visual disturbance  Respiratory: Negative for cough and shortness of breath  Cardiovascular: Negative for chest pain and palpitations  Gastrointestinal: Negative for abdominal pain and vomiting  Genitourinary: Negative for dysuria and hematuria  Musculoskeletal: Negative for arthralgias and back pain          Leg pain   Skin: Negative for color change and rash  Neurological: Negative for seizures and syncope  All other systems reviewed and are negative  Objective:   Temp:  [97 8 °F (36 6 °C)-98 2 °F (36 8 °C)] 97 8 °F (36 6 °C)  HR:  [68-71] 68  Resp:  [18] 18  BP: (124-146)/(68-76) 146/76  SpO2:  [90 %-94 %] 94 % No intake or output data in the 24 hours ending 06/15/22 0841   Nasal Cannula O2 Flow Rate (L/min): 2 L/min Wt Readings from Last 3 Encounters:   06/15/22 (!) 167 kg (368 lb 9 8 oz)   05/04/22 (!) 165 kg (363 lb)   05/02/22 (!) 174 kg (383 lb)        Physical Exam  Vitals and nursing note reviewed  Constitutional:       Appearance: He is well-developed  HENT:      Head: Atraumatic  Comments: Cyst on the right temple, 2cm wide, fluctuant appeared fluid filled  Eyes:      Conjunctiva/sclera: Conjunctivae normal    Cardiovascular:      Rate and Rhythm: Normal rate and regular rhythm  Pulses: Normal pulses  Heart sounds: Normal heart sounds  No murmur heard  Pulmonary:      Effort: Pulmonary effort is normal  No respiratory distress  Breath sounds: Normal breath sounds  No wheezing  Abdominal:      General: Bowel sounds are normal       Palpations: Abdomen is soft  Tenderness: There is no abdominal tenderness  Musculoskeletal:         General: Swelling and tenderness present  Cervical back: Neck supple  Right lower leg: No edema  Left lower leg: Edema present  Comments: erythema   Skin:     General: Skin is warm and dry  Neurological:      Mental Status: He is alert         Laboratory   Results from last 7 days   Lab Units 06/15/22  0734 06/14/22  0543 06/13/22  1153   WBC Thousand/uL 6 04 6 35 8 26   HEMOGLOBIN g/dL 10 6* 9 8* 11 7*   HEMATOCRIT % 34 2* 32 0* 36 8   PLATELETS Thousands/uL 269 238 274   NEUTROS PCT % 71 72 81*   MONOS PCT % 9 10 8     Results from last 7 days   Lab Units 06/15/22  0734 06/14/22  0543 06/13/22  1153   SODIUM mmol/L 139 135* 136   POTASSIUM mmol/L 4 3 3 9 3 8   CHLORIDE mmol/L 101 100 99*   CO2 mmol/L 28 27 27   ANION GAP mmol/L 10 8 10   BUN mg/dL 28* 29* 18   CREATININE mg/dL 1 23 1 85* 1 41*   CALCIUM mg/dL 8 5 7 9* 8 4   GLUCOSE RANDOM mg/dL 125 106 131   ALT U/L  --   --  18   AST U/L  --   --  21   ALK PHOS U/L  --   --  98   ALBUMIN g/dL  --   --  2 5*   TOTAL BILIRUBIN mg/dL  --   --  0 40     Results from last 7 days   Lab Units 06/15/22  0734 06/13/22  1153   MAGNESIUM mg/dL 2 1 1 6      Results from last 7 days   Lab Units 06/13/22  1153   INR  1 12   PTT seconds 38*          Results from last 7 days   Lab Units 06/13/22  1153   LACTIC ACID mmol/L 0 9     ABG:    VBG:          Micro  Results from last 7 days   Lab Units 06/13/22  1211 06/13/22  1153   BLOOD CULTURE  No Growth at 24 hrs  No Growth at 24 hrs  Diagnostic Imaging / Data: I have personally reviewed pertinent reports  Medications:    MED PRN IV   amLODIPine, 10 mg, Oral, Daily  aspirin, 81 mg, Oral, Daily  atorvastatin, 80 mg, Oral, Daily With Dinner  carvedilol, 6 25 mg, Oral, BID With Meals  cefazolin, 2,000 mg, Intravenous, Q8H  enoxaparin, 40 mg, Subcutaneous, Daily  fish oil, 1,000 mg, Oral, BID  insulin lispro, 2-12 Units, Subcutaneous, 4x Daily (AC & HS)  lisinopril, 20 mg, Oral, Daily  zinc sulfate, 220 mg, Oral, Daily     acetaminophen, 650 mg, Q6H PRN  albuterol, 2 puff, Q6H PRN  HYDROmorphone, 0 2 mg, Q4H PRN  oxyCODONE-acetaminophen, 1 tablet, Q4H PRN             Invasive Devices  Report    Peripheral Intravenous Line  Duration           Peripheral IV 06/14/22 Right;Ventral (anterior) Forearm 1 day                   Ashley Anne MD    Portions of the record may have been created with voice recognition software  Occasional wrong word or "sound a like" substitutions may have occurred due to the inherent limitations of voice recognition software  Read the chart carefully and recognize, using context, where substitutions have occurred

## 2022-06-15 NOTE — UTILIZATION REVIEW
Continued Stay Review    Date: 6/15/22                          Current Patient Class: inpatient  Current Level of Care: med surg  HPI:42 y o  male initially admitted on 6/14/22     Assessment/Plan:   IVABT continued for cellulitis LLE  Persistent erythema noted  Using IV Dilaudid for pain control  Monitor lines of demarcation and fever curve, follow blood cultures  CLOVIS resolved, IVF d/c'd  Lisinopril resumed      Vital Signs:   Date/Time Temp Pulse Resp BP MAP (mmHg) SpO2 Calculated FIO2 (%) - Nasal Cannula Nasal Cannula O2 Flow Rate (L/min) O2 Device Patient Position - Orthostatic VS   06/15/22 08:56:11 -- 65 -- 148/81 103 93 % -- -- -- --   06/15/22 0854 -- -- -- 148/81 -- -- -- -- -- --   06/15/22 07:08:22 -- 68 -- 146/76 99 94 % -- -- -- --     Pertinent Labs/Diagnostic Results:   Results from last 7 days   Lab Units 06/13/22  1155   SARS-COV-2  Negative     Results from last 7 days   Lab Units 06/15/22  0734 06/14/22  0543 06/13/22  1153   WBC Thousand/uL 6 04 6 35 8 26   HEMOGLOBIN g/dL 10 6* 9 8* 11 7*   HEMATOCRIT % 34 2* 32 0* 36 8   PLATELETS Thousands/uL 269 238 274   NEUTROS ABS Thousands/µL 4 32 4 58 6 66     Results from last 7 days   Lab Units 06/15/22  0734 06/14/22  0543 06/13/22  1153   SODIUM mmol/L 139 135* 136   POTASSIUM mmol/L 4 3 3 9 3 8   CHLORIDE mmol/L 101 100 99*   CO2 mmol/L 28 27 27   ANION GAP mmol/L 10 8 10   BUN mg/dL 28* 29* 18   CREATININE mg/dL 1 23 1 85* 1 41*   EGFR ml/min/1 73sq m 72 44 61   CALCIUM mg/dL 8 5 7 9* 8 4   MAGNESIUM mg/dL 2 1  --  1 6     Results from last 7 days   Lab Units 06/13/22  1153   AST U/L 21   ALT U/L 18   ALK PHOS U/L 98   TOTAL PROTEIN g/dL 7 9   ALBUMIN g/dL 2 5*   TOTAL BILIRUBIN mg/dL 0 40     Results from last 7 days   Lab Units 06/15/22  0703 06/14/22  2136 06/14/22  1607 06/14/22  1134 06/14/22  0719 06/13/22 2052 06/13/22  1702   POC GLUCOSE mg/dl 144* 123 126 157* 108 120 94     Results from last 7 days   Lab Units 06/15/22  0734 06/14/22  0543 06/13/22  1153   GLUCOSE RANDOM mg/dL 125 106 131     Results from last 7 days   Lab Units 06/13/22  1153   PROTIME seconds 14 2   INR  1 12   PTT seconds 38*     Results from last 7 days   Lab Units 06/13/22  1153   LACTIC ACID mmol/L 0 9     Results from last 7 days   Lab Units 06/13/22  1153   CRP mg/L 404 1*     Results from last 7 days   Lab Units 06/13/22  1155   INFLUENZA A PCR  Negative   INFLUENZA B PCR  Negative   RSV PCR  Negative     Results from last 7 days   Lab Units 06/13/22  1211 06/13/22  1153   BLOOD CULTURE  No Growth at 24 hrs  No Growth at 24 hrs  Medications:   amLODIPine, 10 mg, Oral, Daily  aspirin, 81 mg, Oral, Daily  atorvastatin, 80 mg, Oral, Daily With Dinner  carvedilol, 6 25 mg, Oral, BID With Meals  cefazolin, 2,000 mg, Intravenous, Q8H  enoxaparin, 40 mg, Subcutaneous, Daily  fish oil, 1,000 mg, Oral, BID  insulin lispro, 2-12 Units, Subcutaneous, 4x Daily (AC & HS)  lisinopril, 20 mg, Oral, Daily  zinc sulfate, 220 mg, Oral, Daily    PRN Meds:  acetaminophen, 650 mg, Oral, Q6H PRN  albuterol, 2 puff, Inhalation, Q6H PRN  HYDROmorphone, 0 2 mg, Intravenous, Q4H PRN, 6/15 x2  oxyCODONE-acetaminophen, 1 tablet, Oral, Q4H PRN    Discharge Plan: d    Network Utilization Review Department  ATTENTION: Please call with any questions or concerns to [phone] and carefully listen to the prompts so that you are directed to the right person  All voicemails are confidential   Dandy [employer] all requests for admission clinical reviews, approved or denied determinations and any other requests to dedicated fax number below belonging to the campus where the patient is receiving treatment   List of dedicated fax numbers for the Facilities:  [address] DENIALS (Administrative/Medical Necessity) [phone]   [address] [place] [address] (Maternity/NICU/Pediatrics) 261 [facility],7Th Floor 40 [facility] [address]  [phone]   [address] [facility] [address] [id number] [place] [address] [address] [place] [address] [phone]

## 2022-06-15 NOTE — PLAN OF CARE
Problem: PAIN - ADULT  Goal: Verbalizes/displays adequate comfort level or baseline comfort level  Description: Interventions:  - Encourage patient to monitor pain and request assistance  - Assess pain using appropriate pain scale  - Administer analgesics based on type and severity of pain and evaluate response  - Implement non-pharmacological measures as appropriate and evaluate response  - Consider cultural and social influences on pain and pain management  - Notify physician/advanced practitioner if interventions unsuccessful or patient reports new pain  Outcome: Progressing     Problem: Prexisting or High Potential for Compromised Skin Integrity  Goal: Skin integrity is maintained or improved  Description: INTERVENTIONS:  - Identify patients at risk for skin breakdown  - Assess and monitor skin integrity  - Assess and monitor nutrition and hydration status  - Monitor labs   - Assess for incontinence   - Turn and reposition patient  - Assist with mobility/ambulation  - Relieve pressure over bony prominences  - Avoid friction and shearing  - Provide appropriate hygiene as needed including keeping skin clean and dry  - Evaluate need for skin moisturizer/barrier cream  - Collaborate with interdisciplinary team   - Patient/family teaching  - Consider wound care consult   Outcome: Progressing     Problem: SAFETY ADULT  Goal: Maintain or return to baseline ADL function  Description: INTERVENTIONS:  -  Assess patient's ability to carry out ADLs; assess patient's baseline for ADL function and identify physical deficits which impact ability to perform ADLs (bathing, care of mouth/teeth, toileting, grooming, dressing, etc )  - Assess/evaluate cause of self-care deficits   - Assess range of motion  - Assess patient's mobility; develop plan if impaired  - Assess patient's need for assistive devices and provide as appropriate  - Encourage maximum independence but intervene and supervise when necessary  - Involve family in performance of ADLs  - Assess for home care needs following discharge   - Consider OT consult to assist with ADL evaluation and planning for discharge  - Provide patient education as appropriate  Outcome: Progressing     Problem: INFECTION - ADULT  Goal: Absence or prevention of progression during hospitalization  Description: INTERVENTIONS:  - Assess and monitor for signs and symptoms of infection  - Monitor lab/diagnostic results  - Monitor all insertion sites, i e  iv  - Administer medications as ordered  - Instruct and encourage patient and family to use good hand hygiene technique  Outcome: Progressing

## 2022-06-16 NOTE — UTILIZATION REVIEW
Notification of Discharge   This is a Notification of Discharge from our facility [address]  Please be advised that this patient has been discharge from our facility  Below you will find the admission and discharge date and time including the patients disposition  UTILIZATION REVIEW CONTACT:  Rosalind Arguelles  Utilization   Network Utilization Review Department  Phone: [phone] x carefully listen to the prompts  All voicemails are confidential   Email: [email or web address]     PHYSICIAN ADVISORY SERVICES:  FOR DANH-LK-WXGH REVIEW - MEDICAL NECESSITY DENIAL  Phone: [phone]  Fax: [phone]  Email: [email or web address]     PRESENTATION DATE: 6/13/2022 11:09 AM  OBERVATION ADMISSION DATE:   INPATIENT ADMISSION DATE: 6/14/22  2:54 PM   DISCHARGE DATE: 6/15/2022  1:35 PM  DISPOSITION: Home/Self Care Home/Self Care      IMPORTANT INFORMATION:  Send all requests for admission clinical reviews, approved or denied determinations and any other requests to dedicated fax number below belonging to the campus where the patient is receiving treatment   List of dedicated fax numbers:  [address] [place] [address] DENIALS (Administrative/Medical Necessity) [phone]   [address] (Maternity/NICU/Pediatrics) [phone]   [facility] [phone]   130 [facility] [phone]   [address] [place] [address] [phone]   2000 [facility] [address] [place] [address],[address] [phone]   [facility]  [phone]   2205 [facility]  2401 [facility] 1000 [address] [facility] [phone]

## 2022-06-17 ENCOUNTER — TRANSITIONAL CARE MANAGEMENT (OUTPATIENT)
Dept: FAMILY MEDICINE CLINIC | Facility: CLINIC | Age: 42
End: 2022-06-17

## 2022-06-18 LAB
BACTERIA BLD CULT: NORMAL
BACTERIA BLD CULT: NORMAL

## 2022-06-19 NOTE — PROGRESS NOTES
Assessment/Plan:  TCM Call (since 5/20/2022)     Date and time call was made  6/17/2022  9:21 AM    Hospital care reviewed  Records reviewed    Patient was hospitialized at  [address] [facility]    Date of Admission  06/13/22    Date of discharge  06/15/22    Diagnosis  cellulitis left lower extremity    Disposition  Home    Current Symptoms  None      TCM Call (since 5/20/2022)     Post hospital issues  None    Should patient be enrolled in anticoag monitoring? No    Scheduled for follow up? Yes    Patients specialists  Other (comment)    Other specialists names  Wound Care    Did you obtain your prescribed medications  Yes    Do you need help managing your prescriptions or medications  No    Is transportation to your appointment needed  No    I have advised the patient to call PCP with any new or worsening symptoms  Allie Yang LPN           Diagnoses and all orders for this visit:    Encounter for support and coordination of transition of care  64-year-old morbidly obese male who is presenting today for his transition of care management status post discharge from [facility] on 06/15/2022, status post treatment for cellulitis of the left lower extremity  Patient is currently much improved and has been consistent with his antibiotics  Patient is scheduled follow-up with wound care tomorrow  Patient has also been encouraged to ensure follow-up with lymphedema Clinic  Patient confirms understanding  Other orders  -     oxyCODONE-acetaminophen (PERCOCET) 5-325 mg per tablet        Subjective:      Patient ID: Gio Yu is a 40 y o  male  64-year-old morbidly obese male who is presenting today for his transition of care management status post admission and discharge from [facility] (6/13/22-6/15/22) following treatment for left lower extremity cellulitis    Patient endorses consistency with taking his antibiotics (Keflex) and acknowledges marked improvement with a cellulitis of his left lower extremity  Patient is scheduled to follow-up with wound care on 06/21/2022  Patient also acknowledges he needs to follow up with lymphedema clinic  Patient at this time denies any fever or any acute issues patient is otherwise improving  The following portions of the patient's history were reviewed and updated as appropriate:   He  has a past medical history of Arthritis, Breathing difficulty, Coronary artery disease, Diabetes mellitus ([facility] ), Diabetic neuropathy ([place] [address] ) (5/28/2021), Heart disease, Hidradenitis suppurativa, History of transfusion, Hyperlipidemia, Hypertension, MI (myocardial infarction) ([facility] ), Morbid obesity with BMI of 50 0-59 9, adult ([place] [address] ), Nicotine dependence, Obesity, and Pilonidal cyst   He   Patient Active Problem List    Diagnosis Date Noted    Cellulitis of multiple sites of trunk 12/02/2021    Lymphedema of both lower extremities 06/15/2022    History of coronary angioplasty with insertion of stent 06/13/2022    Colitis 05/04/2022    Abscess of multiple sites of buttock 05/04/2022    Anemia 04/01/2022    Swelling of left lower extremity 03/30/2022    History of hidradenitis suppurativa 03/30/2022    Nocturnal hypoxia 12/04/2021    Chest pain 12/02/2021    Cellulitis of left lower extremity 06/23/2021    H/O drainage of abscess 06/21/2021    Difficult intubation 05/28/2021    Morbid obesity with body mass index (BMI) of 50 0 to 59 9 in adult [facility]) 03/10/2021    Type 1 non-ST elevation myocardial infarction (NSTEMI) ([facility] ) 11/29/2020    Left groin mass 11/29/2020    Type 2 diabetes mellitus with diabetic polyneuropathy, without long-term current use of insulin ([place] [address] ) 07/26/2018    Dyslipidemia 01/23/2017    Pilonidal cyst 12/13/2016    Essential hypertension 01/14/2016    Abnormal liver enzymes 09/23/2014    Coronary artery disease 06/25/2014     He  has a past surgical history that includes Leg Surgery (Left); Coronary angioplasty with stent;  Incision and drainage of wound (N/A, 1/24/2017); pr exc skin benig >4 cm trunk,arm,leg (Left, 4/6/2021); pr neg press wound tx, < 50 cm (Left, 4/6/2021); Wound debridement (Left, 4/17/2021); Wound debridement (Left, 4/22/2021); Wound debridement (Left, 5/26/2021); Wound debridement (Left, 5/28/2021); pr debridement, skin, sub-q tissue,=<20 sq cm (Left, 7/6/2021); pr exc sweat gland lesn perineal,complx (Left, 7/6/2021); and Cardiac surgery  His family history includes Diabetes in his father; Heart disease in his father and mother; Hypertension in his mother  He  reports that he quit smoking about 15 months ago  His smoking use included cigarettes  He started smoking about 17 months ago  He has a 30 00 pack-year smoking history  He has never used smokeless tobacco  He reports previous alcohol use  He reports that he does not use drugs  Current Outpatient Medications   Medication Sig Dispense Refill    albuterol (ProAir HFA) 90 mcg/act inhaler Inhale 2 puffs every 6 (six) hours as needed for wheezing 8 5 g 3    amLODIPine (NORVASC) 10 mg tablet Take 1 tablet (10 mg total) by mouth daily 90 tablet 1    aspirin (ECOTRIN LOW STRENGTH) 81 mg EC tablet Take 1 tablet (81 mg total) by mouth daily 30 tablet 0    atorvastatin (LIPITOR) 80 mg tablet Take 1 tablet (80 mg total) by mouth daily 90 tablet 1    carvedilol (COREG) 6 25 mg tablet Take 1 tablet (6 25 mg total) by mouth 2 (two) times a day with meals 180 tablet 1    cephalexin (KEFLEX) 250 mg capsule Take 2 capsules (500 mg total) by mouth 4 (four) times a day for 5 days 40 capsule 0    liraglutide (VICTOZA) injection Inject 0 1 mL (0 6 mg total) under the skin daily Start at 0 6 mg daily for a week, then increase to 1 2 mg daily on the second week, then increase to 1 8 mg daily on the third week and going forward   3 mL 3    lisinopril (ZESTRIL) 20 mg tablet Take 1 tablet (20 mg total) by mouth daily 90 tablet 1    metFORMIN (GLUCOPHAGE) 1000 MG tablet Take 1 tablet (1,000 mg total) by mouth 2 (two) times a day with meals 180 tablet 1    Omega-3 Fatty Acids (fish oil) 1,000 mg Take 1 capsule (1,000 mg total) by mouth 2 (two) times a day 180 capsule 2    Insulin Pen Needle (PEN NEEDLES 29GX1/2") 29G X 12MM MISC Use 1 needle daily for subcutaneous injections (Patient not taking: No sig reported) 50 each 3    oxyCODONE-acetaminophen (PERCOCET) 5-325 mg per tablet       ticagrelor (Brilinta) 90 MG Take 1 tablet (90 mg total) by mouth every 12 (twelve) hours (Patient not taking: No sig reported) 180 tablet 2    zinc sulfate (ZINCATE) 220 mg capsule Take 1 capsule (220 mg total) by mouth daily 30 capsule 0     No current facility-administered medications for this visit  Current Outpatient Medications on File Prior to Visit   Medication Sig    albuterol (ProAir HFA) 90 mcg/act inhaler Inhale 2 puffs every 6 (six) hours as needed for wheezing    amLODIPine (NORVASC) 10 mg tablet Take 1 tablet (10 mg total) by mouth daily    aspirin (ECOTRIN LOW STRENGTH) 81 mg EC tablet Take 1 tablet (81 mg total) by mouth daily    atorvastatin (LIPITOR) 80 mg tablet Take 1 tablet (80 mg total) by mouth daily    carvedilol (COREG) 6 25 mg tablet Take 1 tablet (6 25 mg total) by mouth 2 (two) times a day with meals    cephalexin (KEFLEX) 250 mg capsule Take 2 capsules (500 mg total) by mouth 4 (four) times a day for 5 days    liraglutide (VICTOZA) injection Inject 0 1 mL (0 6 mg total) under the skin daily Start at 0 6 mg daily for a week, then increase to 1 2 mg daily on the second week, then increase to 1 8 mg daily on the third week and going forward      lisinopril (ZESTRIL) 20 mg tablet Take 1 tablet (20 mg total) by mouth daily    metFORMIN (GLUCOPHAGE) 1000 MG tablet Take 1 tablet (1,000 mg total) by mouth 2 (two) times a day with meals    Omega-3 Fatty Acids (fish oil) 1,000 mg Take 1 capsule (1,000 mg total) by mouth 2 (two) times a day    Insulin Pen Needle (PEN NEEDLES 29GX1/2") 29G X 12MM MISC Use 1 needle daily for subcutaneous injections (Patient not taking: No sig reported)    oxyCODONE-acetaminophen (PERCOCET) 5-325 mg per tablet     ticagrelor (Brilinta) 90 MG Take 1 tablet (90 mg total) by mouth every 12 (twelve) hours (Patient not taking: No sig reported)    zinc sulfate (ZINCATE) 220 mg capsule Take 1 capsule (220 mg total) by mouth daily     No current facility-administered medications on file prior to visit  He is allergic to amoxicillin       Review of Systems   Constitutional: Negative  HENT: Negative  Respiratory: Negative  Cardiovascular: Negative  Gastrointestinal: Negative  Genitourinary: Negative  Skin: Positive for color change (Left lower extremity), rash (Left lower extremity) and wound (Left lower extremity)  Objective:      /70 (BP Location: Left arm, Patient Position: Sitting, Cuff Size: Large)   Pulse 65   Temp 97 5 °F (36 4 °C) (Tympanic)   Resp 18   Ht 5' 10" (1 778 m)   Wt (!) 168 kg (370 lb 3 2 oz)   SpO2 95%   BMI 53 12 kg/m²          Physical Exam  Vitals reviewed  Constitutional:       General: He is not in acute distress  Appearance: Normal appearance  He is obese  He is not ill-appearing, toxic-appearing or diaphoretic  HENT:      Head: Normocephalic and atraumatic  Right Ear: External ear normal       Left Ear: External ear normal       Nose: Nose normal  No congestion or rhinorrhea  Mouth/Throat:      Mouth: Mucous membranes are moist    Eyes:      General: No scleral icterus  Right eye: No discharge  Left eye: No discharge  Conjunctiva/sclera: Conjunctivae normal       Pupils: Pupils are equal, round, and reactive to light  Cardiovascular:      Rate and Rhythm: Normal rate and regular rhythm  Pulses: Normal pulses  Heart sounds: Normal heart sounds  No murmur heard  No gallop     Pulmonary:      Effort: Pulmonary effort is normal       Breath sounds: Normal breath sounds  No wheezing, rhonchi or rales  Musculoskeletal:         General: Swelling and tenderness present  Normal range of motion  Left lower leg: Edema present  Comments: Chronic venous stasis of left lower extremity  Left lower extremity slightly tender to palpation appears somewhat tense   Skin:     General: Skin is warm and dry  Neurological:      Mental Status: He is alert and oriented to person, place, and time

## 2022-06-20 ENCOUNTER — OFFICE VISIT (OUTPATIENT)
Dept: FAMILY MEDICINE CLINIC | Facility: CLINIC | Age: 42
End: 2022-06-20
Payer: COMMERCIAL

## 2022-06-20 VITALS
SYSTOLIC BLOOD PRESSURE: 112 MMHG | OXYGEN SATURATION: 95 % | DIASTOLIC BLOOD PRESSURE: 70 MMHG | WEIGHT: 315 LBS | BODY MASS INDEX: 45.1 KG/M2 | TEMPERATURE: 97.5 F | HEIGHT: 70 IN | RESPIRATION RATE: 18 BRPM | HEART RATE: 65 BPM

## 2022-06-20 DIAGNOSIS — Z76.89 ENCOUNTER FOR SUPPORT AND COORDINATION OF TRANSITION OF CARE: Primary | ICD-10-CM

## 2022-06-20 PROCEDURE — 99496 TRANSJ CARE MGMT HIGH F2F 7D: CPT | Performed by: FAMILY MEDICINE

## 2022-06-20 RX ORDER — OXYCODONE HYDROCHLORIDE AND ACETAMINOPHEN 5; 325 MG/1; MG/1
TABLET ORAL
COMMUNITY
Start: 2022-06-15

## 2022-06-21 ENCOUNTER — OFFICE VISIT (OUTPATIENT)
Dept: WOUND CARE | Facility: HOSPITAL | Age: 42
End: 2022-06-21
Payer: COMMERCIAL

## 2022-06-21 VITALS — HEART RATE: 69 BPM | DIASTOLIC BLOOD PRESSURE: 81 MMHG | TEMPERATURE: 96.7 F | SYSTOLIC BLOOD PRESSURE: 127 MMHG

## 2022-06-21 DIAGNOSIS — T81.89XA NON-HEALING SURGICAL WOUND, INITIAL ENCOUNTER: Primary | ICD-10-CM

## 2022-06-21 DIAGNOSIS — I89.0 ACQUIRED LYMPHEDEMA OF LEG: ICD-10-CM

## 2022-06-21 DIAGNOSIS — E66.01 MORBID OBESITY DUE TO EXCESS CALORIES (HCC): ICD-10-CM

## 2022-06-21 PROCEDURE — 99213 OFFICE O/P EST LOW 20 MIN: CPT | Performed by: FAMILY MEDICINE

## 2022-06-21 PROCEDURE — 11045 DBRDMT SUBQ TISS EACH ADDL: CPT | Performed by: FAMILY MEDICINE

## 2022-06-21 PROCEDURE — 11042 DBRDMT SUBQ TIS 1ST 20SQCM/<: CPT | Performed by: FAMILY MEDICINE

## 2022-06-21 RX ORDER — LIDOCAINE HYDROCHLORIDE 40 MG/ML
5 SOLUTION TOPICAL ONCE
Status: COMPLETED | OUTPATIENT
Start: 2022-06-21 | End: 2022-06-21

## 2022-06-21 RX ADMIN — LIDOCAINE HYDROCHLORIDE 5 ML: 40 SOLUTION TOPICAL at 10:08

## 2022-06-21 NOTE — PATIENT INSTRUCTIONS
Orders Placed This Encounter   Procedures    Debridement     This order was created via procedure documentation    Wound cleansing and dressings     Cleanse wound to left groin with soap and water and dry well (may shower)  Apply Mesalt dressing (cut to size) to wound  Cover with ABD pad and tape with Medipore tape  This was done today  Wait 24 hrs until next shower (post debridement)       Standing Status:   Future     Standing Expiration Date:   6/21/2023

## 2022-06-21 NOTE — PROGRESS NOTES
Patient ID: Reena Villanueva is a 40 y o  male Date of Birth 1980       Chief Complaint   Patient presents with    Follow Up Wound Care Visit     Open wound left groin       Allergies:  Amoxicillin    Diagnosis:      Diagnosis ICD-10-CM Associated Orders   1  Non-healing surgical wound, initial encounter  T81 [id number] Debridement     Wound cleansing and dressings   2  Acquired lymphedema of leg  I89 0    3  Morbid obesity due to excess calories ([facility])  E66 01            Assessment & Plan:   S/p recent hospitalization for cellulitis of left lower extremity  Completing his course of Keflex today  Overall improved   Postop wound of the left medial thigh minimally improved  o Surgical debridement  o Continue with Mesalt daily and ABDs  Difficult to use other a products such as collagen because he changes the dressing daily and because of location   Morbid obesity   Acquired lymphedema of the left leg secondary to surgery  o Patient to start with lymphedema clinic   Return to clinic three weeks as per patient request          Subjective:   3/7/22:  Patient is a 37year old male who presents to the [address] [place] [address] center as a new patient for a non-healing surgical wound of his left medial thigh  Patient reports his wound has been present since last April  He reports his wound developed d/t patient developing multiple abscesses on his medial thigh which needed to be I&D'd  He reports since then his wound has just never healed  He reports his wound drains a large amount of drainage  He was following up with Dr Patrick Romero for management of his wound  He was initially managing his wound with Maxorb but he reports that d/t his drainage the Maxorb would get too wet which would make his wound too wet, so he has been keeping his wound covered with only ABD pads changing his dressing multiple times per day  He denies any pain, fevers, or chills         6/21/22:  Since last visit in May, the patient was hospitalized for cellulitis of the left lower extremity  He was discharged a few days ago  He is completing his course of oral antibiotics  He believes that his abdominal wound is now closed  Patient has a history of hidradenitis suppurativa  Acquired lymphedema of the left lower extremity secondary to surgery approximately a year ago        The following portions of the patient's history were reviewed and updated as appropriate:   Patient Active Problem List   Diagnosis    Abnormal liver enzymes    Coronary artery disease    Dyslipidemia    Essential hypertension    Pilonidal cyst    Type 2 diabetes mellitus with diabetic polyneuropathy, without long-term current use of insulin (HCC)    Type 1 non-ST elevation myocardial infarction (NSTEMI) ([facility] )    Left groin mass    Morbid obesity with body mass index (BMI) of 50 0 to 59 9 in adult [facility])    Difficult intubation    H/O drainage of abscess    Cellulitis of left lower extremity    Cellulitis of multiple sites of trunk    Chest pain    Nocturnal hypoxia    Swelling of left lower extremity    History of hidradenitis suppurativa    Anemia    Colitis    Abscess of multiple sites of buttock    History of coronary angioplasty with insertion of stent    Lymphedema of both lower extremities     Past Medical History:   Diagnosis Date    Arthritis     Breathing difficulty     Coronary artery disease     Diabetes mellitus ([facility] )     Diabetic neuropathy ([facility] ) 5/28/2021    Heart disease     CAD   s/p ptca with 1 stent 2012    Hidradenitis suppurativa     groin    History of transfusion     Hyperlipidemia     Hypertension     MI (myocardial infarction) ([facility] )     " silent " M I  in the past    Morbid obesity with BMI of 50 0-59 9, adult ([facility] )     Nicotine dependence     Obesity     Pilonidal cyst      Past Surgical History:   Procedure Laterality Date    CARDIAC SURGERY      CORONARY ANGIOPLASTY WITH STENT PLACEMENT      INCISION AND DRAINAGE OF WOUND N/A 1/24/2017    Procedure: INCISION AND DRAINAGE (I&D) BUTTOCK, PILONIDAL CYST;  Surgeon: Estephanie Alcazar MD;  Location: [address] [place] [address];  Service:    [employer] LEG SURGERY Left     I&D of left leg 2012    WI DEBRIDEMENT, SKIN, SUB-Q TISSUE,=<20 SQ CM Left 7/6/2021    Procedure: DEBRIDEMENT WOUND [facility] OUT); Surgeon: Felipe Tellez MD;  [facility];  Service: General    WI EXC SKIN BENIG >4 CM TRUNK,ARM,LEG Left 4/6/2021    Procedure: EXCISION THIGH MASS;  Surgeon: Felipe Tellez MD;  [facility];  Service: General    WI EXC SWEAT GLAND Martita Dayana Left 7/6/2021    Procedure: EXCISION PERINEAL ABSCESS LEFT THIGH;  Surgeon: Felipe Tellez MD;  [facility];  Service: General    WI NEG PRESS WOUND TX, < 50 CM Left 4/6/2021    Procedure: APPLICATION VAC DRESSING THIGH;  Surgeon: Felipe Tellez MD;  [facility];  Service: General    WOUND DEBRIDEMENT Left 4/17/2021    Procedure: DEBRIDEMENT WOUND AND DRESSING CHANGE ([address] OUT); Surgeon: Adeola Arredondo DO;  [facility];  Service: General    WOUND DEBRIDEMENT Left 4/22/2021    Procedure: DEBRIDEMENT LOWER EXTREMITY [facility] OUT), left groin and thigh;  Surgeon: Felipe Tellez MD;  [facility];  Service: General    WOUND DEBRIDEMENT Left 5/26/2021    Procedure: DEBRIDEMENT LOWER EXTREMITY ([address] OUT); Surgeon:  Maya Camarillo DO;  [facility];  Service: General    WOUND DEBRIDEMENT Left 5/28/2021    Procedure: Washout left thigh wound and closure;  Surgeon: Mikal Guerrero DO;  [facility];  Service: General     Family History   Problem Relation Age of Onset    Heart disease Father     Diabetes Father     Hypertension Mother     Heart disease Mother       Social History     Socioeconomic History    Marital status: Single     Spouse name: None    Number of children: 1    Years of education: 15    Highest education level: High school graduate   Occupational History    None   Tobacco Use    Smoking status: Former Smoker     Packs/day: 1 50     Years: 20 00     Pack years: 30 00     Types: Cigarettes     Start date:      Quit date: 2021     Years since quittin 3    Smokeless tobacco: Never Used   Vaping Use    Vaping Use: Never used   Substance and Sexual Activity    Alcohol use: Not Currently     Comment: rarely    Drug use: No    Sexual activity: Not Currently     Partners: Female   Other Topics Concern    None   Social History Narrative    None     Social Determinants of Health     Financial Resource Strain: Not on file   Food Insecurity: No Food Insecurity    Worried About Running Out of Food in the Last Year: Never true    Vanessa of Food in the Last Year: Never true   Transportation Needs: No Transportation Needs    Lack of Transportation (Medical): No    Lack of Transportation (Non-Medical):  No   Physical Activity: Not on file   Stress: Not on file   Social Connections: Not on file   Intimate Partner Violence: Not on file   Housing Stability: Low Risk     Unable to Pay for Housing in the Last Year: No    Number of Places Lived in the Last Year: 1    Unstable Housing in the Last Year: No        Current Outpatient Medications:     albuterol (ProAir HFA) 90 mcg/act inhaler, Inhale 2 puffs every 6 (six) hours as needed for wheezing, Disp: 8 5 g, Rfl: 3    amLODIPine (NORVASC) 10 mg tablet, Take 1 tablet (10 mg total) by mouth daily, Disp: 90 tablet, Rfl: 1    aspirin (ECOTRIN LOW STRENGTH) 81 mg EC tablet, Take 1 tablet (81 mg total) by mouth daily, Disp: 30 tablet, Rfl: 0    atorvastatin (LIPITOR) 80 mg tablet, Take 1 tablet (80 mg total) by mouth daily, Disp: 90 tablet, Rfl: 1    carvedilol (COREG) 6 25 mg tablet, Take 1 tablet (6 25 mg total) by mouth 2 (two) times a day with meals, Disp: 180 tablet, Rfl: 1    Insulin Pen Needle (PEN NEEDLES 29GX1/2") 29G X 12MM MISC, Use 1 needle daily for subcutaneous injections (Patient not taking: No sig reported), Disp: 50 each, Rfl: 3   liraglutide (VICTOZA) injection, Inject 0 1 mL (0 6 mg total) under the skin daily Start at 0 6 mg daily for a week, then increase to 1 2 mg daily on the second week, then increase to 1 8 mg daily on the third week and going forward , Disp: 3 mL, Rfl: 3    lisinopril (ZESTRIL) 20 mg tablet, Take 1 tablet (20 mg total) by mouth daily, Disp: 90 tablet, Rfl: 1    metFORMIN (GLUCOPHAGE) 1000 MG tablet, Take 1 tablet (1,000 mg total) by mouth 2 (two) times a day with meals, Disp: 180 tablet, Rfl: 1    Omega-3 Fatty Acids (fish oil) 1,000 mg, Take 1 capsule (1,000 mg total) by mouth 2 (two) times a day, Disp: 180 capsule, Rfl: 2    oxyCODONE-acetaminophen (PERCOCET) 5-325 mg per tablet, , Disp: , Rfl:     ticagrelor (Brilinta) 90 MG, Take 1 tablet (90 mg total) by mouth every 12 (twelve) hours (Patient not taking: No sig reported), Disp: 180 tablet, Rfl: 2    zinc sulfate (ZINCATE) 220 mg capsule, Take 1 capsule (220 mg total) by mouth daily, Disp: 30 capsule, Rfl: 0    Review of Systems   Constitutional: Negative for chills and fever  HENT: Negative for ear pain and hearing loss  Eyes: Negative for pain  Respiratory: Negative for chest tightness and shortness of breath  Cardiovascular: Positive for leg swelling (Left leg)  Negative for chest pain and palpitations  Gastrointestinal: Negative for diarrhea, nausea and vomiting  Genitourinary: Negative for dysuria  Musculoskeletal: Negative for gait problem  Skin: Positive for wound (Left thigh and question of healed abdomen)  Neurological: Negative for tremors and weakness  Psychiatric/Behavioral: Negative for behavioral problems, confusion and suicidal ideas  Objective:  /81   Pulse 69   Temp (!) 96 7 °F (35 9 °C) (Temporal)   Pain Score:   3     Physical Exam  Vitals and nursing note reviewed  Constitutional:       Appearance: Normal appearance  He is morbidly obese  HENT:      Head: Normocephalic and atraumatic  Cardiovascular:      Rate and Rhythm: Normal rate  Pulmonary:      Effort: Pulmonary effort is normal    Musculoskeletal:         General: Normal range of motion  Right lower leg: No edema  Left lower leg: Edema present  Skin:     General: Skin is warm and dry  Findings: Wound present  No erythema  Comments: Left medial thigh wound with adherent slough and fibrin  This is located in the middle of extensive scarring  Left calf with erythema and lymphedema which is tender to palpation  No lymphangitic streaking  Abdominal wound is closed  Evidence of previous wounds with scarring  Neurological:      Mental Status: He is alert and oriented to person, place, and time  Psychiatric:         Mood and Affect: Mood normal          Behavior: Behavior normal          Thought Content: Thought content normal                   Wound 03/07/22 Surgical Leg Left;Upper;Medial (Active)   Wound Image Images linked 06/21/22 0911   Wound Description Pink;Granulation tissue; Yellow;Slough; Epithelialization; Hypergranulation 06/21/22 0834   Sarika-wound Assessment Edema;Pink;Scar Tissue 06/21/22 0834   Wound Length (cm) 3 8 cm 06/21/22 0834   Wound Width (cm) 11 cm 06/21/22 0834   Wound Depth (cm) 0 1 cm 06/21/22 0834   Wound Surface Area (cm^2) 41 8 cm^2 06/21/22 0834   Wound Volume (cm^3) 4 18 cm^3 06/21/22 0834   Calculated Wound Volume (cm^3) 4 18 cm^3 06/21/22 0834   Change in Wound Size % 28 91 06/21/22 0834   Drainage Amount Small 06/21/22 0834   Drainage Description Serous; Yellow 06/21/22 0834   Non-staged Wound Description Full thickness 06/21/22 0834   Dressing Status Intact; Old drainage (upon arrival) 06/21/22 0834       Wound 04/11/22 Incision Abdomen Left;Medial;Lower (Active)   Wound Image Images linked (Simultaneous filing   User may not have seen previous data ) 06/21/22 0837   Wound Description Epithelialization 06/21/22 0837   Wound Length (cm) 1 cm 06/21/22 0837   Wound Width (cm) 1 5 cm 06/21/22 0837   Wound Depth (cm) 0 1 cm 06/21/22 0837   Wound Surface Area (cm^2) 1 5 cm^2 06/21/22 0837   Wound Volume (cm^3) 0 15 cm^3 06/21/22 0837   Calculated Wound Volume (cm^3) 0 15 cm^3 06/21/22 0837   Change in Wound Size % 6 25 06/21/22 0837   Drainage Amount None 06/21/22 0837   Non-staged Wound Description Not applicable 08/74/02 6558   Dressing Status Other (Comment) (no dressing upon arrival) 06/21/22 0837       Debridement   Wound 03/07/22 Surgical Leg Left;Upper;Medial    Universal Protocol:  Consent: Verbal consent obtained  Written consent obtained  Consent given by: patient  Time out: Immediately prior to procedure a "time out" was called to verify the correct patient, procedure, equipment, support staff and site/side marked as required    Patient understanding: patient states understanding of the procedure being performed  Patient identity confirmed: verbally with patient      Performed by: physician  Debridement type: surgical  Level of debridement: subcutaneous tissue  Pain control: lidocaine 4%  Post-debridement measurements  Length (cm): 3 8  Width (cm): 11  Depth (cm): 0 2  Percent debrided: 100%  Surface Area (cm^2): 41 8  Area debrided (cm^2): 41 8  Volume (cm^3): 8 36  Tissue and other material debrided: dermis and subcutaneous tissue  Devitalized tissue debrided: biofilm, fibrin and slough  Instrument(s) utilized: curette  Bleeding: medium  Hemostasis obtained with: pressure  Procedural pain (0-10): 0  Post-procedural pain: 0   Response to treatment: procedure was tolerated well               Results from last 6 Months   Lab Units 03/30/22  1700   WOUND CULTURE  3+ Growth of Beta Hemolytic Streptococcus Group C*  Few Colonies of Staphylococcus aureus*  Few Colonies of Beta Hemolytic Streptococcus Group G*  Few Colonies of        Wound Instructions:  Orders Placed This Encounter   Procedures    Debridement     This order was created via procedure documentation    Wound cleansing and dressings     Cleanse wound to left groin with soap and water and dry well (may shower)  Apply Mesalt dressing (cut to size) to wound  Cover with ABD pad and tape with Medipore tape  This was done today  Wait 24 hrs until next shower (post debridement)  Standing Status:   Future     Standing Expiration Date:   6/21/2023       Marcelino Schilling MD, CHT, CWS    Portions of the record may have been created with voice recognition software  Occasional wrong word or "sound alike" substitutions may have occurred due to the inherent limitations of voice recognition software  Read the chart carefully and recognize, using context, where substitutions have occurred

## 2022-07-05 ENCOUNTER — TELEPHONE (OUTPATIENT)
Dept: SLEEP CENTER | Facility: CLINIC | Age: 42
End: 2022-07-05

## 2022-07-05 NOTE — TELEPHONE ENCOUNTER
----- Message from Vivek Dunaway DO sent at 7/1/2022 11:39 PM EDT -----  approved  ----- Message -----  From: Elmo Oh  Sent: 7/1/2022   8:04 AM EDT  To: Sleep Medicine [facility] Provider    This Diagnostic sleep study needs approval      If approved please sign and return to clerical pool  If denied please include reasons why  Also provide alternative testing if warranted  Please sign and return to clerical pool

## 2022-07-12 ENCOUNTER — OFFICE VISIT (OUTPATIENT)
Dept: WOUND CARE | Facility: HOSPITAL | Age: 42
End: 2022-07-12
Payer: COMMERCIAL

## 2022-07-12 VITALS
TEMPERATURE: 97.6 F | HEART RATE: 70 BPM | RESPIRATION RATE: 18 BRPM | DIASTOLIC BLOOD PRESSURE: 83 MMHG | SYSTOLIC BLOOD PRESSURE: 123 MMHG

## 2022-07-12 DIAGNOSIS — E66.01 MORBID OBESITY DUE TO EXCESS CALORIES (HCC): ICD-10-CM

## 2022-07-12 DIAGNOSIS — I89.0 ACQUIRED LYMPHEDEMA OF LEG: ICD-10-CM

## 2022-07-12 DIAGNOSIS — T81.89XA NON-HEALING SURGICAL WOUND, INITIAL ENCOUNTER: Primary | ICD-10-CM

## 2022-07-12 DIAGNOSIS — E11.42 TYPE 2 DIABETES MELLITUS WITH DIABETIC POLYNEUROPATHY, WITHOUT LONG-TERM CURRENT USE OF INSULIN (HCC): ICD-10-CM

## 2022-07-12 PROCEDURE — 11042 DBRDMT SUBQ TIS 1ST 20SQCM/<: CPT | Performed by: FAMILY MEDICINE

## 2022-07-12 PROCEDURE — 11045 DBRDMT SUBQ TISS EACH ADDL: CPT | Performed by: FAMILY MEDICINE

## 2022-07-12 PROCEDURE — 99213 OFFICE O/P EST LOW 20 MIN: CPT | Performed by: FAMILY MEDICINE

## 2022-07-12 RX ORDER — LIDOCAINE HYDROCHLORIDE 40 MG/ML
5 SOLUTION TOPICAL ONCE
Status: COMPLETED | OUTPATIENT
Start: 2022-07-12 | End: 2022-07-12

## 2022-07-12 RX ADMIN — LIDOCAINE HYDROCHLORIDE 5 ML: 40 SOLUTION TOPICAL at 08:54

## 2022-07-12 NOTE — PROGRESS NOTES
Patient ID: Quan Lal is a 40 y o  male Date of Birth 1980       Chief Complaint   Patient presents with    Follow Up Wound Care Visit     Open surgical wound L groin       Allergies:  Amoxicillin    Diagnosis:      Diagnosis ICD-10-CM Associated Orders   1  Non-healing surgical wound, initial encounter  T81 89XA lidocaine (XYLOCAINE) 4 % topical solution 5 mL     Wound cleansing and dressings     Wound cleansing and dressings     Debridement   2  Morbid obesity due to excess calories ([facility])  E66 01 lidocaine (XYLOCAINE) 4 % topical solution 5 mL     Wound cleansing and dressings     Wound cleansing and dressings   3  Acquired lymphedema of leg  I89 0    4  Type 2 diabetes mellitus with diabetic polyneuropathy, without long-term current use of insulin ([facility])  E11 42 lidocaine (XYLOCAINE) 4 % topical solution 5 mL     Wound cleansing and dressings     Wound cleansing and dressings           Assessment & Plan:   Abdominal wound is improved  Only pinpoint drainage on occasion  Recommended warm compresses a few times a day   Wound of the left medial thigh with lymphedema   Surgical debridement  Continue with Mesalt   He will return in three weeks as per request   If there is no significant improvement, suggest changing to different treatment  Subjective:   3/7/22:  Patient is a 37year old male who presents to the [facility] wound center as a new patient for a non-healing surgical wound of his left medial thigh  Patient reports his wound has been present since last April  He reports his wound developed d/t patient developing multiple abscesses on his medial thigh which needed to be I&D'd  He reports since then his wound has just never healed  He reports his wound drains a large amount of drainage  He was following up with Dr Sarah José for management of his wound   He was initially managing his wound with Maxorb but he reports that d/t his drainage the Maxorb would get too wet which would make his wound too wet, so he has been keeping his wound covered with only ABD pads changing his dressing multiple times per day  He denies any pain, fevers, or chills  6/21/22:  Since last visit in May, the patient was hospitalized for cellulitis of the left lower extremity  He was discharged a few days ago  He is completing his course of oral antibiotics  He believes that his abdominal wound is now closed  Patient has a history of hidradenitis suppurativa  Acquired lymphedema of the left lower extremity secondary to surgery approximately a year ago  7/12/22: Followup small abdominal wound  Acquire lymphedema of the left lower extremity with open wound of the medial thigh postop  Believes that the abdominal wound is closed  Has not noticed any drainage  Has continued drainage from the thigh wound  No fever chills        The following portions of the patient's history were reviewed and updated as appropriate:   Patient Active Problem List   Diagnosis    Abnormal liver enzymes    Coronary artery disease    Dyslipidemia    Essential hypertension    Pilonidal cyst    Type 2 diabetes mellitus with diabetic polyneuropathy, without long-term current use of insulin (HCC)    Type 1 non-ST elevation myocardial infarction (NSTEMI) ([facility] )    Left groin mass    Morbid obesity with body mass index (BMI) of 50 0 to 59 9 in adult [facility])    Difficult intubation    H/O drainage of abscess    Cellulitis of left lower extremity    Cellulitis of multiple sites of trunk    Chest pain    Nocturnal hypoxia    Swelling of left lower extremity    History of hidradenitis suppurativa    Anemia    Colitis    Abscess of multiple sites of buttock    History of coronary angioplasty with insertion of stent    Lymphedema of both lower extremities     Past Medical History:   Diagnosis Date    Arthritis     Breathing difficulty     Coronary artery disease     Diabetes mellitus ([facility] )     Di[facility] neuropathy [facility] 5/28/2021  Heart disease     CAD   s/p ptca with 1 stent 2012    Hidradenitis suppurativa     groin    History of transfusion     Hyperlipidemia     Hypertension     MI (myocardial infarction) ([facility] [address] )     " silent " M I  in the past    Morbid obesity with BMI of 50 0-59 9, adult ([facility] )     Nicotine dependence     Obesity     Pilonidal cyst      Past Surgical History:   Procedure Laterality Date    CARDIAC SURGERY      CORONARY ANGIOPLASTY WITH STENT PLACEMENT      INCISION AND DRAINAGE OF WOUND N/A 1/24/2017    Procedure: INCISION AND DRAINAGE (I&D) BUTTOCK, PILONIDAL CYST;  Surgeon: Meggan Brock MD;  Location: [address] [place] [address];  Service:    [facility] LEG SURGERY Left     I&D of left leg 2012    RI DEBRIDEMENT, SKIN, SUB-Q TISSUE,=<20 SQ CM Left 7/6/2021    Procedure: DEBRIDEMENT WOUND [facility] OUT); Surgeon: Prema Alcazar MD;  [facility];  Service: General    RI EXC SKIN BENIG >4 CM TRUNK,ARM,LEG Left 4/6/2021    Procedure: EXCISION THIGH MASS;  Surgeon: Prema Alcazar MD;  [facility];  Service: General    RI EXC SWEAT GLAND Ivon  Left 7/6/2021    Procedure: EXCISION PERINEAL ABSCESS LEFT THIGH;  Surgeon: Prema Alcazar MD;  [facility];  Service: General    RI NEG PRESS WOUND TX, < 50 CM Left 4/6/2021    Procedure: APPLICATION VAC DRESSING THIGH;  Surgeon: Prema Alcazar MD;  [facility];  Service: General    WOUND DEBRIDEMENT Left 4/17/2021    Procedure: DEBRIDEMENT WOUND AND DRESSING CHANGE ([facility]); Surgeon: Racquel Palmer DO;  [facility];  Service: General    WOUND DEBRIDEMENT Left 4/22/2021    Procedure: DEBRIDEMENT LOWER EXTREMITY [facility] OUT), left groin and thigh;  Surgeon: Prema Alcazar MD;  [facility];  Service: General    WOUND DEBRIDEMENT Left 5/26/2021    Procedure: DEBRIDEMENT LOWER EXTREMITY ([facility]); Surgeon:  Glenn Rodriguez DO;  [facility];  Service: General    WOUND DEBRIDEMENT Left 5/28/2021    Procedure: Washout left thigh wound and closure;  Surgeon: Randolph Carter DO;  [facility];  Service: General     Family History   Problem Relation Age of Onset    Heart disease Father     Diabetes Father     Hypertension Mother     Heart disease Mother       Social History     Socioeconomic History    Marital status: Single     Spouse name: None    Number of children: 1    Years of education: 15    Highest education level: High school graduate   Occupational History    None   Tobacco Use    Smoking status: Former Smoker     Packs/day: 1 50     Years: 20 00     Pack years: 30 00     Types: Cigarettes     Start date:      Quit date: 2021     Years since quittin 3    Smokeless tobacco: Never Used   Vaping Use    Vaping Use: Never used   Substance and Sexual Activity    Alcohol use: Not Currently     Comment: rarely    Drug use: No    Sexual activity: Not Currently     Partners: Female   Other Topics Concern    None   Social History Narrative    None     Social Determinants of Health     Financial Resource Strain: Not on file   Food Insecurity: No Food Insecurity    Worried About Running Out of Food in the Last Year: Never true    Vanessa of Food in the Last Year: Never true   Transportation Needs: No Transportation Needs    Lack of Transportation (Medical): No    Lack of Transportation (Non-Medical):  No   Physical Activity: Not on file   Stress: Not on file   Social Connections: Not on file   Intimate Partner Violence: Not on file   Housing Stability: Low Risk     Unable to Pay for Housing in the Last Year: No    Number of Places Lived in the Last Year: 1    Unstable Housing in the Last Year: No        Current Outpatient Medications:     albuterol (ProAir HFA) 90 mcg/act inhaler, Inhale 2 puffs every 6 (six) hours as needed for wheezing, Disp: 8 5 g, Rfl: 3    amLODIPine (NORVASC) 10 mg tablet, Take 1 tablet (10 mg total) by mouth daily, Disp: 90 tablet, Rfl: 1    aspirin (ECOTRIN LOW STRENGTH) 81 mg EC tablet, Take 1 tablet (81 mg total) by mouth daily, Disp: 30 tablet, Rfl: 0    atorvastatin (LIPITOR) 80 mg tablet, Take 1 tablet (80 mg total) by mouth daily, Disp: 90 tablet, Rfl: 1    carvedilol (COREG) 6 25 mg tablet, Take 1 tablet (6 25 mg total) by mouth 2 (two) times a day with meals, Disp: 180 tablet, Rfl: 1    Insulin Pen Needle (PEN NEEDLES 29GX1/2") 29G X 12MM MISC, Use 1 needle daily for subcutaneous injections (Patient not taking: No sig reported), Disp: 50 each, Rfl: 3    liraglutide (VICTOZA) injection, Inject 0 1 mL (0 6 mg total) under the skin daily Start at 0 6 mg daily for a week, then increase to 1 2 mg daily on the second week, then increase to 1 8 mg daily on the third week and going forward , Disp: 3 mL, Rfl: 3    lisinopril (ZESTRIL) 20 mg tablet, Take 1 tablet (20 mg total) by mouth daily, Disp: 90 tablet, Rfl: 1    metFORMIN (GLUCOPHAGE) 1000 MG tablet, Take 1 tablet (1,000 mg total) by mouth 2 (two) times a day with meals, Disp: 180 tablet, Rfl: 1    Omega-3 Fatty Acids (fish oil) 1,000 mg, Take 1 capsule (1,000 mg total) by mouth 2 (two) times a day, Disp: 180 capsule, Rfl: 2    oxyCODONE-acetaminophen (PERCOCET) 5-325 mg per tablet, , Disp: , Rfl:     ticagrelor (Brilinta) 90 MG, Take 1 tablet (90 mg total) by mouth every 12 (twelve) hours (Patient not taking: No sig reported), Disp: 180 tablet, Rfl: 2    zinc sulfate (ZINCATE) 220 mg capsule, Take 1 capsule (220 mg total) by mouth daily, Disp: 30 capsule, Rfl: 0  No current facility-administered medications for this visit  Review of Systems   Constitutional: Negative for chills and fever  HENT: Negative for ear pain and hearing loss  Eyes: Negative for pain  Respiratory: Negative for chest tightness and shortness of breath  Cardiovascular: Positive for leg swelling (Left leg)  Negative for chest pain and palpitations  Gastrointestinal: Negative for diarrhea, nausea and vomiting     Genitourinary: Negative for dysuria  Musculoskeletal: Negative for gait problem  Skin: Positive for wound (Left thigh and question of healed abdomen)  Neurological: Negative for tremors and weakness  Psychiatric/Behavioral: Negative for behavioral problems, confusion and suicidal ideas  Objective:  /83   Pulse 70   Temp 97 6 °F (36 4 °C) (Temporal)   Resp 18   Pain Score: 0-No pain     Physical Exam  Vitals and nursing note reviewed  Constitutional:       Appearance: Normal appearance  He is morbidly obese  HENT:      Head: Normocephalic and atraumatic  Cardiovascular:      Rate and Rhythm: Normal rate  Pulmonary:      Effort: Pulmonary effort is normal    Musculoskeletal:         General: Normal range of motion  Right lower leg: No edema  Left lower leg: Edema present  Skin:     General: Skin is warm and dry  Findings: Wound present  No erythema  Comments: Left medial thigh wound with adherent slough and fibrin  This is located in the middle of extensive scarring  Left calf with erythema and lymphedema which is tender to palpation  No lymphangitic streaking  Abdominal wound has few pinpoint openings with minimal drainage  Induration in the area without erythema  Evidence of previous wounds with scarring  Neurological:      Mental Status: He is alert and oriented to person, place, and time  Psychiatric:         Mood and Affect: Mood normal          Behavior: Behavior normal          Thought Content: Thought content normal                  Wound 03/07/22 Surgical Leg Left;Upper;Medial (Active)   Wound Image Images linked 07/12/22 0902   Wound Description Pink;Granulation tissue; Yellow;Slough; Epithelialization 07/12/22 0844   Sarika-wound Assessment Edema;Pink;Scar Tissue 07/12/22 0844   Wound Length (cm) 4 cm 07/12/22 0844   Wound Width (cm) 13 cm 07/12/22 0844   Wound Depth (cm) 0 1 cm 07/12/22 0844   Wound Surface Area (cm^2) 52 cm^2 07/12/22 0844   Wound Volume (cm^3) 5 2 cm^3 07/12/22 0844   Calculated Wound Volume (cm^3) 5 2 cm^3 07/12/22 0844   Change in Wound Size % 11 56 07/12/22 0844   Drainage Amount Moderate 07/12/22 0844   Drainage Description Serosanguineous 07/12/22 0844   Non-staged Wound Description Full thickness 07/12/22 0844   Dressing Status Intact; Old drainage (upon arrival) 07/12/22 0844       Wound 04/11/22 Incision Abdomen Left;Medial;Lower (Active)   Wound Image Images linked 07/12/22 0840   Wound Description Epithelialization;Pink;Granulation tissue; White;Slough 07/12/22 0838   Sarika-wound Assessment Pink 07/12/22 0838   Wound Length (cm) 2 cm 07/12/22 0838   Wound Width (cm) 3 5 cm 07/12/22 0838   Wound Depth (cm) 0 1 cm 07/12/22 0838   Wound Surface Area (cm^2) 7 cm^2 07/12/22 0838   Wound Volume (cm^3) 0 7 cm^3 07/12/22 0838   Calculated Wound Volume (cm^3) 0 7 cm^3 07/12/22 0838   Change in Wound Size % -337 5 07/12/22 0838   Drainage Amount None 07/12/22 0838   Non-staged Wound Description Full thickness 07/12/22 8578   Dressing Status Other (Comment) (no drsg upon arrival) 07/12/22 0838       Debridement   Wound 03/07/22 Surgical Leg Left;Upper;Medial    Universal Protocol:  Consent: Verbal consent obtained  Written consent obtained  Consent given by: patient  Time out: Immediately prior to procedure a "time out" was called to verify the correct patient, procedure, equipment, support staff and site/side marked as required    Patient understanding: patient states understanding of the procedure being performed  Patient identity confirmed: verbally with patient      Performed by: physician  Debridement type: surgical  Level of debridement: subcutaneous tissue  Pain control: lidocaine 4%  Post-debridement measurements  Length (cm): 4  Width (cm): 13  Depth (cm): 0 2  Percent debrided: 100%  Surface Area (cm^2): 52  Area debrided (cm^2): 52  Volume (cm^3): 10 4  Tissue and other material debrided: dermis and subcutaneous tissue  Devitalized tissue debrided: fibrin and slough  Instrument(s) utilized: curette  Bleeding: medium  Hemostasis obtained with: pressure  Procedural pain (0-10): 0  Post-procedural pain: 0   Response to treatment: procedure was tolerated well               Results from last 6 Months   Lab Units 03/30/22  1700   WOUND CULTURE  3+ Growth of Beta Hemolytic Streptococcus Group C*  Few Colonies of Staphylococcus aureus*  Few Colonies of Beta Hemolytic Streptococcus Group G*  Few Colonies of        Wound Instructions:  Orders Placed This Encounter   Procedures    Wound cleansing and dressings     Cleanse wound to left groin with soap and water and dry well (may shower)  Apply Mesalt dressing (cut to size) to wound  Cover with ABD pad and tape with Medipore tape      This was done today      Wait 24 hrs until next shower (post debridement)  Standing Status:   Future     Standing Expiration Date:   7/12/2023    Wound cleansing and dressings     Lower abdominal wound:    May shower    Apply warm compresses to the abdominal wound 2x/day until compress cools down  Leave wound open to air  Standing Status:   Future     Standing Expiration Date:   7/12/2023    Debridement     This order was created via procedure documentation     Goyo Mejia MD, CHT, CWS    Portions of the record may have been created with voice recognition software  Occasional wrong word or "sound alike" substitutions may have occurred due to the inherent limitations of voice recognition software  Read the chart carefully and recognize, using context, where substitutions have occurred

## 2022-07-12 NOTE — PATIENT INSTRUCTIONS
Orders Placed This Encounter   Procedures    Wound cleansing and dressings     Cleanse wound to left groin with soap and water and dry well (may shower)  Apply Mesalt dressing (cut to size) to wound  Cover with ABD pad and tape with Medipore tape  This was done today  Wait 24 hrs until next shower (post debridement)  Standing Status:   Future     Standing Expiration Date:   7/12/2023    Wound cleansing and dressings     Lower abdominal wound:    May shower    Apply warm compresses to the abdominal wound 2x/day until compress cools down  Leave wound open to air       Standing Status:   Future     Standing Expiration Date:   7/12/2023

## 2022-08-02 ENCOUNTER — EVALUATION (OUTPATIENT)
Dept: PHYSICAL THERAPY | Facility: CLINIC | Age: 42
End: 2022-08-02
Payer: COMMERCIAL

## 2022-08-02 ENCOUNTER — OFFICE VISIT (OUTPATIENT)
Dept: WOUND CARE | Facility: HOSPITAL | Age: 42
End: 2022-08-02
Payer: COMMERCIAL

## 2022-08-02 VITALS
SYSTOLIC BLOOD PRESSURE: 167 MMHG | HEART RATE: 88 BPM | RESPIRATION RATE: 15 BRPM | DIASTOLIC BLOOD PRESSURE: 98 MMHG | TEMPERATURE: 97.8 F

## 2022-08-02 DIAGNOSIS — I89.0 ACQUIRED LYMPHEDEMA OF LEG: ICD-10-CM

## 2022-08-02 DIAGNOSIS — S81.802S NON-HEALING WOUND OF LOWER EXTREMITY, LEFT, SEQUELA: ICD-10-CM

## 2022-08-02 DIAGNOSIS — E66.01 MORBID OBESITY DUE TO EXCESS CALORIES (HCC): ICD-10-CM

## 2022-08-02 DIAGNOSIS — I89.0 LYMPHEDEMA OF LEFT LOWER EXTREMITY: Primary | ICD-10-CM

## 2022-08-02 DIAGNOSIS — T81.89XA NON-HEALING SURGICAL WOUND, INITIAL ENCOUNTER: Primary | ICD-10-CM

## 2022-08-02 DIAGNOSIS — L73.2 HIDRADENITIS SUPPURATIVA: ICD-10-CM

## 2022-08-02 DIAGNOSIS — I89.0 LYMPHEDEMA OF BOTH LOWER EXTREMITIES: ICD-10-CM

## 2022-08-02 PROCEDURE — 11045 DBRDMT SUBQ TISS EACH ADDL: CPT | Performed by: FAMILY MEDICINE

## 2022-08-02 PROCEDURE — 97535 SELF CARE MNGMENT TRAINING: CPT | Performed by: PHYSICAL THERAPIST

## 2022-08-02 PROCEDURE — 87205 SMEAR GRAM STAIN: CPT | Performed by: FAMILY MEDICINE

## 2022-08-02 PROCEDURE — 11042 DBRDMT SUBQ TIS 1ST 20SQCM/<: CPT | Performed by: FAMILY MEDICINE

## 2022-08-02 PROCEDURE — 97162 PT EVAL MOD COMPLEX 30 MIN: CPT | Performed by: PHYSICAL THERAPIST

## 2022-08-02 PROCEDURE — 99214 OFFICE O/P EST MOD 30 MIN: CPT | Performed by: FAMILY MEDICINE

## 2022-08-02 PROCEDURE — 87147 CULTURE TYPE IMMUNOLOGIC: CPT | Performed by: FAMILY MEDICINE

## 2022-08-02 PROCEDURE — 87070 CULTURE OTHR SPECIMN AEROBIC: CPT | Performed by: FAMILY MEDICINE

## 2022-08-02 RX ORDER — LIDOCAINE HYDROCHLORIDE 40 MG/ML
5 SOLUTION TOPICAL ONCE
Status: COMPLETED | OUTPATIENT
Start: 2022-08-02 | End: 2022-08-02

## 2022-08-02 RX ADMIN — LIDOCAINE HYDROCHLORIDE 5 ML: 40 SOLUTION TOPICAL at 08:33

## 2022-08-02 NOTE — PROGRESS NOTES
PT Evaluation     Today's date: 2022  Patient name: Lisa Hale  : 1980  MRN: [id number]  Referring provider: Gary Armijo MD  Dx:   Encounter Diagnosis     ICD-10-CM    1  Lymphedema of left lower extremity  I89 0    2  Non-healing wound of lower extremity, left, sequela  S81 [id number]                   Assessment  Assessment details: Lisa Hale is a 40y o  year old male who presents to [facility] with:   Lymphedema of left lower extremity  (primary encounter diagnosis)  Non-healing wound of lower extremity, left, sequela    Deja Salcido is a 40 y o  male presents to [facility] with symptoms consistent with lymphedema including: + stemmer's sign, skin fibrosis, mild pitting, and history of insult to lymphatic system due to surgery with multiple infections and surgical complications  Skilled PT treatment is advised utilizing full CDT protocol to include MLD, exercise, and skin management to manage lymphedema and optimize patient's functional potential  Compression is also recommended as part of CDT protocol to maintain reduction in swelling and provide support for lymphatic system, PT recommending velcro compression for upper thigh and possible custom garment for L LE in the future for patient  Patient may also benefit from home compression pump in future     Impairments: abnormal gait, abnormal or restricted ROM, abnormal movement, activity intolerance, impaired balance, impaired physical strength and lacks appropriate home exercise program  Other impairment: increased swelling, lacks knowledge of lymphedema and proper skin care  Functional limitations: Walking, getting up from a chair, work duties -- patient works as  involving prolonged standing and liftingBarriers to therapy: Chronic swelling, chronic infections, BMI > 30,   Understanding of Dx/Px/POC: good   Prognosis: fair  Prognosis details: Deja Salcido presents to [facility] with secondary L LE lymphedema  stGstrstastdstest:st st1st Fibrosis present: Yes, throughout L LE    Goals  STG  - Patient and/or caregiver will understand lymphedema precautions to decrease risk of infection and exacerbation of lymphedema  - Patient will develop a tolerance for wearing multi-layer, short stretch bandages betweens sessions to facilitate limb decongestion  - Patient will experience decrease in pitting edema which will improve tissue health and decrease risk of infection    - Patient will perform HEP independently or with minimal assistance to help improve lymphatic flow and venous return    LTG  - Patient will experience increased ROM and functional mobility to aid with ADL's such as standing, walking, work duties including prolonged standing and lifting at time of discharge  - Patient will demonstrate more normalized gait pattern and improved balance at time of discharge  - Patient and/or caregiver will be independent with donning and doffing of compression garments which will enable regular daily garment wear at time of discharge, skin maintenance, and self- MLD   - Patient and/or caregiver will be independent with HEP and lymphedema management to prevent relapse and reduce risk of infection and hospitalizations at time of discharge    Plan  Plan details: Thank you for referring Delorse Goodpasture to Physical Therapy at [place] [address] and for the opportunity to coordinate care        Patient would benefit from: lymphedema eval, PT eval and skilled physical therapy  Planned therapy interventions: manual therapy, massage, muscle pump exercises, neuromuscular re-education, orthotic fitting/training, orthotic management and training, patient education, self care, strengthening, stretching, therapeutic activities, therapeutic exercise, graded activity, dressing changes, breathing training, balance, gait training and home exercise program  Other planned therapy interventions: Complete decongestive therapy: Manual lymphatic drainage, compression bandaging, exercise, self-maintenance, and education on bandaging, skin care, and self-bandaging  Frequency: 2x week  Duration in visits: 10  Plan of Care beginning date: 2022  Treatment plan discussed with: patient        Subjective Evaluation    History of Present Illness  Mechanism of injury: Louise Zee presents to  with L LE lymphedema       Swelling history     - Surgery: yes, patient having L LE surgery in May 2021 to remove mass in groin with multiple complications including abscesses and cellulitis a couple months ago    -  Infections (cellulitis): yes, in L LE and has history of cellulitis from infections to abdomen    - Currently wear a compression garment: None, but does have home compression unit    Loss of function/ mobility:     - Dressing: yes    - Lifting: yes      - Walking: yes      - Reaching feet and toes: no     - Bathing/ showering: no     - Preparing meals: no   General Medical History      - Obesity: yes        - Hyperthyroidism: no       - Orthopedic history: L LE pain from swelling  Gastrointestinal:      - Abdominal surgeries: no      - Colitis: yes       - Diverticulitis: no   Genitourinary       - Kidney dysfunction: no      - Bowel and bladder changes: no   Cardiovascular/ Respiratory history     - HTN: yes       - Sleep apnea: no      - Diabetes: yes      - Heart disease: yes, PMH of MI      - Regular follow-ups with cardiologist: yes       - Family history of CVD: no      - Clotting disorders: no      - Varicose veins: no      - PMH wounds: yes, L medial thigh ; and treated?: currently being treated at wound care     - [facility] DVT: yes    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   Occupation:  -- prolonged standing, use of machinery  Goals: "get the swelling down "   Pain  At worst pain ratin  Location: L LE  Quality: pressure  Aggravating factors: walking, standing and stair climbing    Treatments  Current treatment: physical therapy  Patient Goals  Patient goals for therapy: decreased edema, increased strength, independence with ADLs/IADLs, improved balance, increased motion and return to sport/leisure activities          Objective     Observations   Left Knee   Positive for edema  Right Knee   Positive for edema  Additional Observation Details  Palpation:   Skin Mobility: Restricted in L LE   Skin temperature: WNL  Skin color: hemosiderin staining present  Pitting: Yes in L lower leg  Wound presence: yes, currently bandaged from wound care, [facility]  Stemmer's Sign: Yes  Fungal infections: no  Hyperkeratosis: no   Lymphorrhea/ weeping: no  Papillomas: no   Lymph fistulas: no   Lymph cysts: no   Cellulitis: no   Lipodermatosclerosis: no       General Comments: Ankle/Foot Comments   Gait: increased DEJON with gait    MMT R and L LE: 5/5    L knee ROM restrictions due to swelling  PT educated Joann Marquez in regards to pathology of lymphedema, proper skin care, and components of complete decongestive therapy including manual lymph drainage, compression, exercise, and proper skin care with verbalized understanding  PT to contact supplier in regards to compression reduction kits and bandaging where consultant will verify patient's benefits to determine if insurance will cover for bandaging        LOWER EXTREMITY LEFT CALCULATIONS    Flowsheet Row Most Recent Value   Volume LE (mL) 39431   Difference from last visit (mL)  -47391   Difference from first visit (mL)  -36705      LOWER EXTREMITY RIGHT CALCULATIONS    Flowsheet Row Most Recent Value   Volume LE (mL) 56063   Difference from last visit (mL)  -64777   Difference from first visit (mL)  -79102                  Precautions: Chronic swelling, chronic infections, BMI > 30,   HEP: review educational literature provided by PT, order short stretch bandages as highlighted by PT   Daily Treatment Diary     Manuals 8/2            L LE MLD sequence                                       Therapeutic Exercise             Bike              Hip abduction             Hip flexion             Heel raises Toe raises             Lateral walking             Bridges                                                                                                        Self-care/ Home management             Lymphedema education  8'

## 2022-08-02 NOTE — PATIENT INSTRUCTIONS
Orders Placed This Encounter   Procedures    Wound cleansing and dressings     Lower abdominal wound:       Apply warm compresses to the abdominal wound 2x/day until compress cools down  Cover with Wash your hands with soap and water  Remove old dressing, discard into plastic bag and place in trash  Cleanse the wound with soap and water prior to applying a clean dressing  Do not use tissue or cotton balls  Do not scrub the wound  Pat dry using gauze  Shower yes     Apply warm compress to the abdominal  Wound until it cools  Cover with gauze as long as it is draining  Secure with medipore tape  Change dressing daily  This was done today     Standing Status:   Future     Standing Expiration Date:   8/2/2023    Wound cleansing and dressings      Left Upper Left Leg/groin:     Wash your hands with soap and water  Remove old dressing, discard into plastic bag and place in trash  Cleanse the wound with soap and water (cleaned with Prophase today)prior to applying a clean dressing  Do not use tissue or cotton balls  Do not scrub the wound  Pat dry using gauze  Shower yes   Apply skin prep to skin surrounding wound  Apply Aquacel AG to the leg/groin wound  Cover with ABD  Secure with Medipore tape  Change dressing daily  This was done today  Wait 24 hrs until next shower (post debridement)  Standing Status:   Future     Standing Expiration Date:   8/2/2023    Wound compression and edema control     Continue using your lymphedema pump 1-2 times per day  Standing Status:   Future     Standing Expiration Date:   8/2/2023    Wound miscellaneous orders     A wound culture was taken today from abdominal wound       Standing Status:   Future     Standing Expiration Date:   8/2/2023

## 2022-08-02 NOTE — PROGRESS NOTES
Patient ID: Jordan Joseph is a 40 y o  male Date of Birth 1980       Chief Complaint   Patient presents with    Follow Up Wound Care Visit     Abdomen and left upper leg/groin       Allergies:  Amoxicillin    Diagnosis:      Diagnosis ICD-10-CM Associated Orders   1  Non-healing surgical wound, initial encounter  T81 89XA lidocaine (XYLOCAINE) 4 % topical solution 5 mL     Wound cleansing and dressings     Wound cleansing and dressings     Wound compression and edema control     Wound miscellaneous orders     Debridement     Wound culture and Gram stain   2  Acquired lymphedema of leg  I89 0 Wound cleansing and dressings     Wound cleansing and dressings     Wound compression and edema control     Wound miscellaneous orders   3  Morbid obesity due to excess calories ([facility])  E66 01 Wound cleansing and dressings     Wound cleansing and dressings     Wound compression and edema control     Wound miscellaneous orders   4  History of hidradenitis suppurativa  L73 2 Wound compression and edema control     Wound miscellaneous orders           Assessment & Plan:   Postop wound of the left medial thigh  Minimal improvement  Complicated by acquired lymphedema   o Because is now 16 months since his surgery, we discussed the possibility of split-thickness skin grafting  He is not really anxious for this because of the time that he will need to be off from work and he has no in else to help care   o Surgical debridement  Change from Mesalt to Aquacel Ag  Because of the drainage, he needs to change it at least daily   Hidradenitis suppurativa of the abdomen  Seropurulent drainage today  Culture obtained  Dry dressing   Acquired lymphedema the left lower extremity  He does have his 1st meeting with lymphedema clinic today  He is already using a pump  Does not see much improvement  May need some garment that he can wear daily  He works as a  and is on his feet all day long           Subjective: 3/7/22:  Patient is a 37year old male who presents to the [facility] wound center as a new patient for a non-healing surgical wound of his left medial thigh  Patient reports his wound has been present since last April  He reports his wound developed d/t patient developing multiple abscesses on his medial thigh which needed to be I&D'd  He reports since then his wound has just never healed  He reports his wound drains a large amount of drainage  He was following up with Dr Garrett Craven for management of his wound  He was initially managing his wound with Maxorb but he reports that d/t his drainage the Maxorb would get too wet which would make his wound too wet, so he has been keeping his wound covered with only ABD pads changing his dressing multiple times per day  He denies any pain, fevers, or chills  6/21/22:  Since last visit in May, the patient was hospitalized for cellulitis of the left lower extremity  He was discharged a few days ago  He is completing his course of oral antibiotics  He believes that his abdominal wound is now closed  Patient has a history of hidradenitis suppurativa  Acquired lymphedema of the left lower extremity secondary to surgery approximately a year ago  7/12/22: Followup small abdominal wound  Acquire lymphedema of the left lower extremity with open wound of the medial thigh postop  Believes that the abdominal wound is closed  Has not noticed any drainage  Has continued drainage from the thigh wound  No fever chills  8/2/22: Followup small abdominal wound most likely secondary to hidradenitis suppurativa at this point  Acquire lymphedema of the left lower extremity with postop wound of the medial thigh  Continues to have significant drainage requiring him to change the thigh dressing at least daily  Denies any pain, fever or chills        The following portions of the patient's history were reviewed and updated as appropriate:   Patient Active Problem List   Diagnosis    Abnormal liver enzymes    Coronary artery disease    Dyslipidemia    Essential hypertension    Pilonidal cyst    Type 2 diabetes mellitus with diabetic polyneuropathy, without long-term current use of insulin (HCC)    Type 1 non-ST elevation myocardial infarction (NSTEMI) ([facility] )    Left groin mass    Morbid obesity with body mass index (BMI) of 50 0 to 59 9 in adult [facility])    Difficult intubation    H/O drainage of abscess    Cellulitis of left lower extremity    Cellulitis of multiple sites of trunk    Chest pain    Nocturnal hypoxia    Swelling of left lower extremity    History of hidradenitis suppurativa    Anemia    Colitis    Abscess of multiple sites of buttock    History of coronary angioplasty with insertion of stent    Lymphedema of both lower extremities     Past Medical History:   Diagnosis Date    Arthritis     Breathing difficulty     Coronary artery disease     Diabetes mellitus ([facility] )     Diabetic neuropathy ([facility] ) 5/28/2021    Heart disease     CAD   s/p ptca with 1 stent 2012    Hidradenitis suppurativa     groin    History of transfusion     Hyperlipidemia     Hypertension     MI (myocardial infarction) ([facility] )     " silent " M I  in the past    Morbid obesity with BMI of 50 0-59 9, adult [facility]     Nicotine dependence     Obesity     Pilonidal cyst      Past Surgical History:   Procedure Laterality Date    CARDIAC SURGERY      CORONARY ANGIOPLASTY WITH STENT PLACEMENT      INCISION AND DRAINAGE OF WOUND N/A 1/24/2017    Procedure: INCISION AND DRAINAGE (I&D) BUTTOCK, PILONIDAL CYST;  Surgeon: Moustapha Mistry MD;  Location: [address] [place] [address];  Service:    [facility] LEG SURGERY Left     I&D of left leg 2012    DE DEBRIDEMENT, SKIN, SUB-Q TISSUE,=<20 SQ CM Left 7/6/2021    Procedure: DEBRIDEMENT WOUND [facility] OUT);   Surgeon: Vidal Verdin MD;  Location:  MAIN OR;  Service: General    DE EXC SKIN BENIG >4 CM TRUNK,ARM,LEG Left 4/6/2021    Procedure: EXCISION THIGH MASS;  Surgeon: Annabel Velazquez Angel Ghosh MD;  [facility];  Service: General    OK EXC SWEAT GLAND Carter  Left 2021    Procedure: EXCISION PERINEAL ABSCESS LEFT THIGH;  Surgeon: Jonas Santos MD;  [facility];  Service: General    OK NEG PRESS WOUND TX, < 50 CM Left 2021    Procedure: APPLICATION VAC DRESSING THIGH;  Surgeon: Jonas Santos MD;  [facility];  Service: General    WOUND DEBRIDEMENT Left 2021    Procedure: DEBRIDEMENT WOUND AND DRESSING CHANGE ([facility] OUT); Surgeon: Annabelle Olivo DO;  [facility];  Service: General    WOUND DEBRIDEMENT Left 2021    Procedure: DEBRIDEMENT LOWER EXTREMITY [facility] OUT), left groin and thigh;  Surgeon: Jonas Santos MD;  [facility];  Service: General    WOUND DEBRIDEMENT Left 2021    Procedure: DEBRIDEMENT LOWER EXTREMITY ([facility] OUT); Surgeon:  Radha Finn DO;  [facility];  Service: General    WOUND DEBRIDEMENT Left 2021    Procedure: Washout left thigh wound and closure;  Surgeon: Abram Villa DO;  [facility];  Service: General     Family History   Problem Relation Age of Onset    Heart disease Father     Diabetes Father     Hypertension Mother     Heart disease Mother       Social History     Socioeconomic History    Marital status: Single     Spouse name: None    Number of children: 1    Years of education: 15    Highest education level: High school graduate   Occupational History    None   Tobacco Use    Smoking status: Former Smoker     Packs/day: 1 50     Years: 20 00     Pack years: 30 00     Types: Cigarettes     Start date:      Quit date: 2021     Years since quittin 4    Smokeless tobacco: Never Used   Vaping Use    Vaping Use: Never used   Substance and Sexual Activity    Alcohol use: Not Currently     Comment: rarely    Drug use: No    Sexual activity: Not Currently     Partners: Female   Other Topics Concern    None   Social History Narrative    None Social Determinants of Health     Financial Resource Strain: Not on file   Food Insecurity: No Food Insecurity    Worried About Running Out of Food in the Last Year: Never true    Vanessa of Food in the Last Year: Never true   Transportation Needs: No Transportation Needs    Lack of Transportation (Medical): No    Lack of Transportation (Non-Medical):  No   Physical Activity: Not on file   Stress: Not on file   Social Connections: Not on file   Intimate Partner Violence: Not on file   Housing Stability: Low Risk     Unable to Pay for Housing in the Last Year: No    Number of Places Lived in the Last Year: 1    Unstable Housing in the Last Year: No        Current Outpatient Medications:     albuterol (ProAir HFA) 90 mcg/act inhaler, Inhale 2 puffs every 6 (six) hours as needed for wheezing, Disp: 8 5 g, Rfl: 3    amLODIPine (NORVASC) 10 mg tablet, Take 1 tablet (10 mg total) by mouth daily, Disp: 90 tablet, Rfl: 1    aspirin (ECOTRIN LOW STRENGTH) 81 mg EC tablet, Take 1 tablet (81 mg total) by mouth daily, Disp: 30 tablet, Rfl: 0    atorvastatin (LIPITOR) 80 mg tablet, Take 1 tablet (80 mg total) by mouth daily, Disp: 90 tablet, Rfl: 1    carvedilol (COREG) 6 25 mg tablet, Take 1 tablet (6 25 mg total) by mouth 2 (two) times a day with meals, Disp: 180 tablet, Rfl: 1    Insulin Pen Needle (PEN NEEDLES 29GX1/2") 29G X 12MM MISC, Use 1 needle daily for subcutaneous injections (Patient not taking: No sig reported), Disp: 50 each, Rfl: 3    liraglutide (VICTOZA) injection, Inject 0 1 mL (0 6 mg total) under the skin daily Start at 0 6 mg daily for a week, then increase to 1 2 mg daily on the second week, then increase to 1 8 mg daily on the third week and going forward , Disp: 3 mL, Rfl: 3    lisinopril (ZESTRIL) 20 mg tablet, Take 1 tablet (20 mg total) by mouth daily, Disp: 90 tablet, Rfl: 1    metFORMIN (GLUCOPHAGE) 1000 MG tablet, Take 1 tablet (1,000 mg total) by mouth 2 (two) times a day with meals, Disp: 180 tablet, Rfl: 1    Omega-3 Fatty Acids (fish oil) 1,000 mg, Take 1 capsule (1,000 mg total) by mouth 2 (two) times a day, Disp: 180 capsule, Rfl: 2    oxyCODONE-acetaminophen (PERCOCET) 5-325 mg per tablet, , Disp: , Rfl:     ticagrelor (Brilinta) 90 MG, Take 1 tablet (90 mg total) by mouth every 12 (twelve) hours (Patient not taking: No sig reported), Disp: 180 tablet, Rfl: 2    zinc sulfate (ZINCATE) 220 mg capsule, Take 1 capsule (220 mg total) by mouth daily, Disp: 30 capsule, Rfl: 0  No current facility-administered medications for this visit  Review of Systems   Constitutional: Negative for chills and fever  HENT: Negative for ear pain and hearing loss  Eyes: Negative for pain  Respiratory: Negative for chest tightness and shortness of breath  Cardiovascular: Positive for leg swelling (Left leg)  Negative for chest pain and palpitations  Gastrointestinal: Negative for diarrhea, nausea and vomiting  Genitourinary: Negative for dysuria  Musculoskeletal: Negative for gait problem  Skin: Positive for wound (Left thigh and question of healed abdomen)  Neurological: Negative for tremors and weakness  Psychiatric/Behavioral: Negative for behavioral problems, confusion and suicidal ideas  Objective:  /98   Pulse 88   Temp 97 8 °F (36 6 °C)   Resp 15   Pain Score: 0-No pain     Physical Exam  Vitals and nursing note reviewed  Constitutional:       Appearance: Normal appearance  He is morbidly obese  HENT:      Head: Normocephalic and atraumatic  Cardiovascular:      Rate and Rhythm: Normal rate  Pulmonary:      Effort: Pulmonary effort is normal    Musculoskeletal:         General: Normal range of motion  Right lower leg: No edema  Left lower leg: Edema present  Skin:     General: Skin is warm and dry  Findings: Wound present  No erythema  Comments: Left medial thigh wound with adherent slough and fibrin    This is located in the middle of extensive scarring  Abdominal wound has few pinpoint openings with minimal seropurulent drainage  This is consistent with hidradenitis suppurativa  Induration in the area without erythema  Neurological:      Mental Status: He is alert and oriented to person, place, and time  Psychiatric:         Mood and Affect: Mood normal          Behavior: Behavior normal          Thought Content: Thought content normal                  Wound 03/07/22 Surgical Leg Left;Upper;Medial (Active)   Wound Image Images linked 08/02/22 0830   Wound Description Pink;Granulation tissue; Yellow;Slough; Epithelialization 08/02/22 0830   Sarika-wound Assessment Edema;Pink;Scar Tissue 08/02/22 0830   Wound Length (cm) 4 cm 08/02/22 0830   Wound Width (cm) 12 3 cm 08/02/22 0830   Wound Depth (cm) 0 1 cm 08/02/22 0830   Wound Surface Area (cm^2) 49 2 cm^2 08/02/22 0830   Wound Volume (cm^3) 4 92 cm^3 08/02/22 0830   Calculated Wound Volume (cm^3) 4 92 cm^3 08/02/22 0830   Change in Wound Size % 16 33 08/02/22 0830   Drainage Amount Moderate 08/02/22 0830   Drainage Description Serosanguineous 08/02/22 0830   Non-staged Wound Description Full thickness 08/02/22 0830   Dressing Status Intact (upon arrival) 08/02/22 0830       Wound 04/11/22 Incision Abdomen Left;Medial;Lower (Active)   Wound Image Images linked 08/02/22 0829   Wound Description Pink;Granulation tissue 08/02/22 0829   Sarika-wound Assessment Pink;Scar Tissue 08/02/22 0829   Wound Length (cm) 2 cm 08/02/22 0829   Wound Width (cm) 3 2 cm 08/02/22 0829   Wound Depth (cm) 0 1 cm 08/02/22 0829   Wound Surface Area (cm^2) 6 4 cm^2 08/02/22 0829   Wound Volume (cm^3) 0 64 cm^3 08/02/22 0829   Calculated Wound Volume (cm^3) 0 64 cm^3 08/02/22 0829   Change in Wound Size % -300 08/02/22 0829   Drainage Amount Moderate 08/02/22 0829   Drainage Description Clear;Serous;Purulent 08/02/22 0829   Non-staged Wound Description Full thickness 08/02/22 0829   Dressing Status Other (Comment) (no dressing upon arrival) 08/02/22 0829       Debridement   Wound 03/07/22 Surgical Leg Left;Upper;Medial    Universal Protocol:  Consent: Verbal consent obtained  Written consent obtained  Consent given by: patient  Time out: Immediately prior to procedure a "time out" was called to verify the correct patient, procedure, equipment, support staff and site/side marked as required  Patient understanding: patient states understanding of the procedure being performed  Patient identity confirmed: verbally with patient      Performed by: physician  Debridement type: surgical  Level of debridement: subcutaneous tissue  Pain control: lidocaine 4%  Post-debridement measurements  Length (cm): 4  Width (cm): 12 3  Depth (cm): 0 2  Percent debrided: 100%  Surface Area (cm^2): 49 2  Area debrided (cm^2): 49 2  Volume (cm^3): 9 84  Tissue and other material debrided: dermis and subcutaneous tissue  Devitalized tissue debrided: fibrin and slough  Comment regarding debrided tissue: Fibrotic tissue  Instrument(s) utilized: curette  Bleeding: medium  Hemostasis obtained with: pressure  Procedural pain (0-10): 2  Post-procedural pain: 0   Response to treatment: procedure was tolerated well           Results from last 6 Months   Lab Units 03/30/22  1700   WOUND CULTURE  3+ Growth of Beta Hemolytic Streptococcus Group C*  Few Colonies of Staphylococcus aureus*  Few Colonies of Beta Hemolytic Streptococcus Group G*  Few Colonies of        Wound Instructions:  Orders Placed This Encounter   Procedures    Wound cleansing and dressings     Lower abdominal wound:       Apply warm compresses to the abdominal wound 2x/day until compress cools down  Cover with Wash your hands with soap and water  Remove old dressing, discard into plastic bag and place in trash  Cleanse the wound with soap and water prior to applying a clean dressing  Do not use tissue or cotton balls  Do not scrub the wound  Pat dry using gauze    Shower yes Apply warm compress to the abdominal  Wound until it cools  Cover with gauze as long as it is draining  Secure with medipore tape  Change dressing daily  This was done today     Standing Status:   Future     Standing Expiration Date:   8/2/2023    Wound cleansing and dressings      Left Upper Left Leg/groin:     Wash your hands with soap and water  Remove old dressing, discard into plastic bag and place in trash  Cleanse the wound with soap and water (cleaned with Prophase today)prior to applying a clean dressing  Do not use tissue or cotton balls  Do not scrub the wound  Pat dry using gauze  Shower yes   Apply skin prep to skin surrounding wound  Apply Aquacel AG to the leg/groin wound  Cover with ABD  Secure with Medipore tape  Change dressing daily          This was done today      Wait 24 hrs until next shower (post debridement)                Standing Status:   Future     Standing Expiration Date:   8/2/2023    Wound compression and edema control     Continue using your lymphedema pump 1-2 times per day  Standing Status:   Future     Standing Expiration Date:   8/2/2023    Wound miscellaneous orders     A wound culture was taken today from abdominal wound  Standing Status:   Future     Standing Expiration Date:   8/2/2023    Debridement     This order was created via procedure documentation    Wound culture and Gram stain     Standing Status:   Future     Standing Expiration Date:   8/2/2023     Order Specific Question:   Release to patient through [address] [place] [address]     Answer:   Deandra Lim MD, CHT, CWS    Portions of the record may have been created with voice recognition software  Occasional wrong word or "sound alike" substitutions may have occurred due to the inherent limitations of voice recognition software  Read the chart carefully and recognize, using context, where substitutions have occurred

## 2022-08-05 LAB
BACTERIA WND AEROBE CULT: ABNORMAL
BACTERIA WND AEROBE CULT: ABNORMAL
GRAM STN SPEC: ABNORMAL

## 2022-08-17 ENCOUNTER — OFFICE VISIT (OUTPATIENT)
Dept: PHYSICAL THERAPY | Facility: CLINIC | Age: 42
End: 2022-08-17
Payer: COMMERCIAL

## 2022-08-17 DIAGNOSIS — I89.0 LYMPHEDEMA OF BOTH LOWER EXTREMITIES: ICD-10-CM

## 2022-08-17 DIAGNOSIS — S81.802S NON-HEALING WOUND OF LOWER EXTREMITY, LEFT, SEQUELA: ICD-10-CM

## 2022-08-17 DIAGNOSIS — I89.0 LYMPHEDEMA OF LEFT LOWER EXTREMITY: Primary | ICD-10-CM

## 2022-08-17 DIAGNOSIS — I10 ESSENTIAL HYPERTENSION: ICD-10-CM

## 2022-08-17 PROCEDURE — 97140 MANUAL THERAPY 1/> REGIONS: CPT | Performed by: PHYSICAL THERAPIST

## 2022-08-17 NOTE — PROGRESS NOTES
Daily Note  IE 22     Today's date: 2022  Patient name: Carlos Mendoza  : 1980  MRN: [id number]  Referring provider: Mahnaz Pop MD  Dx:   Encounter Diagnosis     ICD-10-CM    1  Lymphedema of left lower extremity  I89 0    2  Non-healing wound of lower extremity, left, sequela  S81 802S    3  Lymphedema of both lower extremities  I89 0                   Subjective: Antonio Endow reports "okay" upon arrival to therapy today  Objective: See treatment diary below      Assessment: Tolerated treatment fair  Patient would benefit from continued PT  PT wrapping patient with SS bandages today with patient reporting "feels okay," instructing for patient to continue to do as able until next visit  Plan: Continue per plan of care  Progress treatment as tolerated             Precautions: Chronic swelling, chronic infections, BMI > 30,   HEP: review educational literature provided by PT, order short stretch bandages as highlighted by PT   Daily Treatment Diary     Manuals            L LE MLD sequence  40' total                                      Therapeutic Exercise             Bike              Hip abduction             Hip flexion             Heel raises             Toe raises             Lateral walking             Bridges                                                                                                        Self-care/ Home management             Lymphedema education  8'

## 2022-08-18 NOTE — DISCHARGE SUMMARY
[facility] Discharge Summary - Medical Luli Foster 37 y o  male MRN: [id number]    [address] / Bed: [facility] 38 Encounter: [id number]    BRIEF OVERVIEW  Admitting Provider: Roger Castillo MD  Discharge Provider: Dr Lovell Phoenix, MD    Discharge To: Home    Outpatient Follow-Up: [facility]    Things to address at first follow up visit:   Did you complete your antibiotic course (patient was discharged on 7 day course of Keflex for cellulitis and 7 day course of Flagyl for colitis)? Any abdominal pain when eating? Any skin pain? Fevers? Is patient seeing wound care for multiple wounds? How is your sleep at night? Any nighttime waking gasping for air? Did he follow up with pulmonology for outpatient sleep study? Strongly encouraged this if not completed  Has patient seen cardiology to assess need for continuation of Brilinta? Check diabetes control, weight management candidate? Labs and results pending at discharge: none    Admission Date: 5/4/2022     Discharge Date: 05/07/22      Primary Discharge Diagnosis  Principal Problem:    Sepsis ([address] )  Active Problems:    Colitis    Abscess of multiple sites of buttock    Type 2 diabetes mellitus with diabetic polyneuropathy, without long-term current use of insulin ([facility])    History of hidradenitis suppurativa    Coronary artery disease    Dyslipidemia    Essential hypertension    Morbid obesity with body mass index (BMI) of 50 0 to 59 9 in adult ([facility])    Nocturnal hypoxia    Swelling of left lower extremity  Resolved Problems:    * No resolved hospital problems   *      Consulting Providers   Wound Care      DETAILS OF HOSPITAL STAY    HPI: Copied from H&P  60-year-old male with a PMH of morbid obesity, CAD s/p stent, HTN, DMT2 with hidradenitis suppurativa, and nocturnal hypoxia presents with generalized abdominal tenderness and cramping, nausea, vomiting, and bloody diarrhea x3 days  Denies any sick contact or recent travel  He had some questionable eggs on Monday evening  Later Monday evening and into Tuesday, he started having bloody diarrhea nausea and vomiting  He reported symptoms are little better today and able to tolerate PO  He notices couple oz of blood in toilet on Tuesday with diarrhea  Patient also started on Victoza last Thursday in addition to metformin a 1000 mg b i d  Patient was recently discharged on 2 weeks of antibiotics for cellulitis at the end of March  He states that the cellulitis of the abdomen has been healing well after completion of the antibiotics course      ED:  Flagyl 500 mg IV, Zofran 4 mg x2, Pepcid 20 mg IV, Tylenol 975 mg, Toradol 15 mg, Dilaudid 1 mg, morphine 4 mg , 0 9% NS 1 L    Hospital Course  Sepsis and Colitis  Patient was admitted for sepsis due to tachycardia and leukocytosis  Source of infection is most likely colitis identified on CT scan  He had 3 days h/o abdominal pain associated with nausea vomiting and bloody diarrhea  Acute colitis is possibly from infectious/food-borne illness  Patient was started on zosyn while the stool study was pending  Gentle hydration provided and electrolytes repleted as needed  He recently initiated Victoza in addition to metformin less than a week ago  Although his presenting symptoms are less likely due to Victoza, both of his home diabetic medications were held  Lipase and LFT were WNL   Insulin sliding scale was used for blood sugar management  On day of discharge, patient was tolerating diet and was comfortable going home with the pain he was feeling after eating  Abscess of multiple sites of buttock   Nonsurgical per surgery as it is a chronic issue  Follow-up with wound care outpatient  Wound management      Physical Exam at Discharge  Constitutional:       General: He is not in acute distress  Appearance: He is well-developed  He is obese     Cardiovascular:      Rate and stated Rhythm: Normal rate and regular rhythm  Pulses: Normal pulses  Heart sounds: Normal heart sounds  No murmur heard        Pulmonary:      Effort: Pulmonary effort is normal  No respiratory distress  Breath sounds: Normal breath sounds  Abdominal:      General: Bowel sounds are normal  There is no distension  Palpations: Abdomen is soft  There is no mass  Tenderness: There is no abdominal tenderness (generalized)  There is no guarding or rebound  Comments: Scarring/keloid under the left anterior abdominal pannus  Slight erythema and warmth of the lower abdomen   Musculoskeletal:      Right lower leg: No edema  Left lower leg: Edema (chronic lymphedema) present  Comments: Varicose veins bilaterally   Skin:     General: Skin is warm  Comments: Soft nontender mass about 1 cm in diameter on the left upper buttock  2 Abscess to the left and right of the midline buttock     Neurological:      Mental Status: He is alert  Psychiatric:         Mood and Affect: Mood normal          Behavior: Behavior normal      Procedures Performed/Pertinent Test results  5/6: WBC 10 73, Hgb 11 9, Cr 1 15  5/5: WBC 12 4, Hgb 11 7, Cr 1 38, Lipase 102  5/4: WBC 16 51, LA 0 7, Cr 1 09, Lipase 81    CT: Diffuse wall thickening of the left colon most suggestive of a colitis, favoring an inflammatory/infectious etiology  Abcess on the upper buttock crease  Persistent cutaneous thickening and subcutaneous infiltration in the anterior lower pelvic wall, suggestive of cellulitis  Medications   Current Discharge Medication List          Current Discharge Medication List      CONTINUE these medications which have NOT CHANGED    Details   albuterol (ProAir HFA) 90 mcg/act inhaler Inhale 2 puffs every 6 (six) hours as needed for wheezing  Qty: 8 5 g, Refills: 3    Comments: Substitution to a formulary equivalent within the same pharmaceutical class is authorized    Associated Diagnoses: Shortness of breath      amLODIPine (NORVASC) 10 mg tablet Take 1 tablet (10 mg total) by mouth daily  Qty: 90 tablet, Refills: 1    Associated Diagnoses: Essential hypertension      aspirin (ECOTRIN LOW STRENGTH) 81 mg EC tablet Take 1 tablet (81 mg total) by mouth daily  Qty: 30 tablet, Refills: 0    Associated Diagnoses: CAD (coronary artery disease)      atorvastatin (LIPITOR) 80 mg tablet Take 1 tablet (80 mg total) by mouth daily  Qty: 90 tablet, Refills: 1    Associated Diagnoses: Dyslipidemia      carvedilol (COREG) 6 25 mg tablet Take 1 tablet (6 25 mg total) by mouth 2 (two) times a day with meals  Qty: 180 tablet, Refills: 1    Associated Diagnoses: Essential hypertension      Insulin Pen Needle (PEN NEEDLES 29GX1/2") 29G X 12MM MISC Use 1 needle daily for subcutaneous injections  Qty: 50 each, Refills: 3    Associated Diagnoses: DM type 2 with diabetic peripheral neuropathy (HCC)      liraglutide (VICTOZA) injection Inject 0 1 mL (0 6 mg total) under the skin daily Start at 0 6 mg daily for a week, then increase to 1 2 mg daily on the second week, then increase to 1 8 mg daily on the third week and going forward  Qty: 3 mL, Refills: 3    Associated Diagnoses: DM type 2 with diabetic peripheral neuropathy ([address] );  Morbid obesity (HCC)      lisinopril (ZESTRIL) 20 mg tablet Take 1 tablet (20 mg total) by mouth daily  Qty: 90 tablet, Refills: 1    Associated Diagnoses: Essential hypertension      metFORMIN (GLUCOPHAGE) 1000 MG tablet Take 1 tablet (1,000 mg total) by mouth 2 (two) times a day with meals  Qty: 180 tablet, Refills: 1    Associated Diagnoses: Type 2 diabetes mellitus with hyperglycemia, without long-term current use of insulin (HCC)      Omega-3 Fatty Acids (fish oil) 1,000 mg Take 1 capsule (1,000 mg total) by mouth 2 (two) times a day  Qty: 180 capsule, Refills: 2    Associated Diagnoses: Dyslipidemia      ticagrelor (Brilinta) 90 MG Take 1 tablet (90 mg total) by mouth every 12 (twelve) hours  Qty: 180 tablet, Refills: 2    Associated Diagnoses: Coronary artery disease, unspecified vessel or lesion type, unspecified whether angina present, unspecified whether native or transplanted heart      zinc sulfate (ZINCATE) 220 mg capsule Take 1 capsule (220 mg total) by mouth daily  Qty: 30 capsule, Refills: 0    Associated Diagnoses: Cellulitis of abdominal wall            Current Discharge Medication List      START taking these medications    Details   cephalexin (KEFLEX) 500 mg capsule Take 1 capsule (500 mg total) by mouth every 8 (eight) hours for 7 days  Qty: 21 capsule, Refills: 0    Associated Diagnoses: Cellulitis of multiple sites of trunk      ibuprofen (MOTRIN) 600 mg tablet Take 1 tablet (600 mg total) by mouth every 6 (six) hours as needed for mild pain or moderate pain  Qty: 20 tablet, Refills: 0    Associated Diagnoses: Colitis; Cellulitis of multiple sites of trunk      metroNIDAZOLE (FLAGYL) 500 mg tablet Take 1 tablet (500 mg total) by mouth every 8 (eight) hours for 8 days  Qty: 24 tablet, Refills: 0    Associated Diagnoses: Colitis            Current Discharge Medication List      STOP taking these medications       acetaminophen (TYLENOL) 325 mg tablet Comments:   Reason for Stopping:                  Allergies  Allergies   Allergen Reactions    Amoxicillin Hives     childhood       Diet restrictions: none  Activity restrictions: none  Code Status: Level 1 - Full Code  Advance Directive and Living Will: <no information>  Power of :    POLST:      Discharge Condition: stable      Discharge  Statement   I spent 25 minutes discharging the patient  This time was spent on the day of discharge  I had direct contact with the patient on the day of discharge  Additional documentation is required if more than 30 minutes were spent on discharge

## 2022-08-21 RX ORDER — CARVEDILOL 6.25 MG/1
6.25 TABLET ORAL 2 TIMES DAILY WITH MEALS
Qty: 180 TABLET | Refills: 1 | Status: SHIPPED | OUTPATIENT
Start: 2022-08-21

## 2022-08-23 ENCOUNTER — APPOINTMENT (OUTPATIENT)
Dept: PHYSICAL THERAPY | Facility: CLINIC | Age: 42
End: 2022-08-23
Payer: COMMERCIAL

## 2022-08-23 ENCOUNTER — OFFICE VISIT (OUTPATIENT)
Dept: WOUND CARE | Facility: HOSPITAL | Age: 42
End: 2022-08-23
Payer: COMMERCIAL

## 2022-08-23 VITALS
RESPIRATION RATE: 18 BRPM | TEMPERATURE: 98.2 F | SYSTOLIC BLOOD PRESSURE: 139 MMHG | HEART RATE: 75 BPM | DIASTOLIC BLOOD PRESSURE: 75 MMHG

## 2022-08-23 DIAGNOSIS — L03.116 CELLULITIS OF LEFT LOWER LEG: ICD-10-CM

## 2022-08-23 DIAGNOSIS — L02.214 ABSCESS OF LEFT GROIN: ICD-10-CM

## 2022-08-23 DIAGNOSIS — E66.01 MORBID OBESITY DUE TO EXCESS CALORIES (HCC): ICD-10-CM

## 2022-08-23 DIAGNOSIS — I89.0 ACQUIRED LYMPHEDEMA OF LEG: ICD-10-CM

## 2022-08-23 DIAGNOSIS — L73.2 HIDRADENITIS SUPPURATIVA: ICD-10-CM

## 2022-08-23 DIAGNOSIS — E11.42 TYPE 2 DIABETES MELLITUS WITH DIABETIC POLYNEUROPATHY, WITHOUT LONG-TERM CURRENT USE OF INSULIN (HCC): ICD-10-CM

## 2022-08-23 DIAGNOSIS — T81.89XA NON-HEALING SURGICAL WOUND, INITIAL ENCOUNTER: Primary | ICD-10-CM

## 2022-08-23 PROCEDURE — 10060 I&D ABSCESS SIMPLE/SINGLE: CPT | Performed by: NURSE PRACTITIONER

## 2022-08-23 PROCEDURE — 97598 DBRDMT OPN WND ADDL 20CM/<: CPT | Performed by: NURSE PRACTITIONER

## 2022-08-23 PROCEDURE — 99213 OFFICE O/P EST LOW 20 MIN: CPT | Performed by: NURSE PRACTITIONER

## 2022-08-23 PROCEDURE — 10061 I&D ABSCESS COMP/MULTIPLE: CPT | Performed by: NURSE PRACTITIONER

## 2022-08-23 PROCEDURE — 97597 DBRDMT OPN WND 1ST 20 CM/<: CPT | Performed by: NURSE PRACTITIONER

## 2022-08-23 RX ORDER — DOXYCYCLINE HYCLATE 100 MG/1
100 CAPSULE ORAL EVERY 12 HOURS SCHEDULED
Qty: 20 CAPSULE | Refills: 0 | Status: SHIPPED | OUTPATIENT
Start: 2022-08-23 | End: 2022-08-30 | Stop reason: SDUPTHER

## 2022-08-23 NOTE — PROGRESS NOTES
Incision and Drainage    Date/Time: 8/23/2022 9:31 AM  Performed by: PETRA Tay  Authorized by: PETRA Tay   Universal Protocol:  Consent: Written consent obtained  Consent given by: patient  Time out: Immediately prior to procedure a "time out" was called to verify the correct patient, procedure, equipment, support staff and site/side marked as required  Timeout called at: 8/23/2022 9:31 AM   Patient understanding: patient states understanding of the procedure being performed  Patient consent: the patient's understanding of the procedure matches consent given  Procedure consent matches procedure scheduled: N/A  Relevant documents present and verified: N/A  Test results available and properly labeled: N/A  Site marked: the operative site was marked  Imaging studies available: N/A  Required blood products, implants, devices, and special equipment available: N/A  Patient identity confirmed: verbally with patient      Patient location:  Clinic  Location:     Type:  Abscess    Location:  Lower extremity    Lower extremity location:  L leg (Left groin)  Pre-procedure details:     Skin preparation:  Betadine  Sedation:     Sedation type: N/A  Anesthesia (see MAR for exact dosages): Anesthesia method:  Local infiltration    Local anesthetic:  Lidocaine 1% WITH epi  Procedure details:     Complexity:  Simple    Needle aspiration: no      Incision types:  Single straight    Scalpel blade:  11    Incision depth:  Subcutaneous    Wound management:  Probed and deloculated    Drainage:  Bloody and purulent    Drainage amount: Moderate    Wound treatment:  Packing placed    Packing materials:  1/2 in iodoform gauze  Post-procedure details:     Patient tolerance of procedure:   Tolerated well, no immediate complications

## 2022-08-23 NOTE — LETTER
August 23, 2022     Patient: Loralyn Gowers  YOB: 1980  Date of Visit: 8/23/2022      To Whom it May Concern:    Lesia Mancera is under my professional care  [place]o Downings was seen in my office on 8/23/2022  Laurie Ovens may return to work with limitations no heavy lifting or strenuous work  If you have any questions or concerns, please don't hesitate to call           Sincerely,          PETRA Roque        CC: No Recipients

## 2022-08-23 NOTE — PROGRESS NOTES
Patient ID: Jewel Connor is a 40 y o  male Date of Birth 1980     Chief Complaint  Chief Complaint   Patient presents with    Follow Up Wound Care Visit     Abd and leg wound       Allergies  Amoxicillin    Assessment:     Diagnoses and all orders for this visit:    Non-healing surgical wound, initial encounter  -     Cancel: Wound cleansing and dressings; Future  -     Wound cleansing and dressings; Future  -     Wound compression and edema control; Future  -     Cancel: Wound cleansing and dressings; Future  -     Wound miscellaneous orders; Future  -     Wound cleansing and dressings; Future  -     Wound cleansing and dressings; Future    Acquired lymphedema of leg  -     Cancel: Wound cleansing and dressings; Future  -     Wound cleansing and dressings; Future  -     Wound compression and edema control; Future  -     Cancel: Wound cleansing and dressings; Future  -     Wound miscellaneous orders; Future  -     Wound cleansing and dressings; Future  -     Wound cleansing and dressings; Future    Morbid obesity due to excess calories (HCC)  -     Cancel: Wound cleansing and dressings; Future  -     Wound cleansing and dressings; Future  -     Wound compression and edema control; Future  -     Cancel: Wound cleansing and dressings; Future  -     Wound miscellaneous orders; Future  -     Wound cleansing and dressings; Future  -     Wound cleansing and dressings; Future    History of hidradenitis suppurativa  -     Cancel: Wound cleansing and dressings; Future  -     Wound cleansing and dressings; Future  -     Wound compression and edema control; Future  -     Cancel: Wound cleansing and dressings; Future  -     Wound miscellaneous orders; Future  -     Wound cleansing and dressings; Future  -     Wound cleansing and dressings; Future    Type 2 diabetes mellitus with diabetic polyneuropathy, without long-term current use of insulin (HCC)  -     Cancel: Wound cleansing and dressings;  Future  -     Wound cleansing and dressings; Future  -     Wound compression and edema control; Future  -     Cancel: Wound cleansing and dressings; Future  -     Wound miscellaneous orders; Future  -     Wound cleansing and dressings; Future  -     Wound cleansing and dressings; Future    Abscess of left groin  -     doxycycline hyclate (VIBRAMYCIN) 100 mg capsule; Take 1 capsule (100 mg total) by mouth every 12 (twelve) hours for 10 days    Cellulitis of left lower leg  -     doxycycline hyclate (VIBRAMYCIN) 100 mg capsule; Take 1 capsule (100 mg total) by mouth every 12 (twelve) hours for 10 days              Debridement   Wound 03/07/22 Surgical Leg Left;Upper;Medial    Universal Protocol:  Consent: Written consent obtained  Consent given by: patient  Time out: Immediately prior to procedure a "time out" was called to verify the correct patient, procedure, equipment, support staff and site/side marked as required  Timeout called at: 8/23/2022 9:29 AM   Patient understanding: patient states understanding of the procedure being performed  Patient consent: the patient's understanding of the procedure matches consent given  Procedure consent matches procedure scheduled: N/A  Relevant documents present and verified: N/A  Test results available and properly labeled: N/A  Site marked: the operative site was marked  Imaging studies available: N/A  Required blood products, implants, devices, and special equipment available: N/A    Patient identity confirmed: verbally with patient      Performed by: NP  Debridement type: selective  Pain control: lidocaine 4%  Pre-debridement measurements  Length (cm): 3 4  Width (cm): 12 4  Depth (cm): 0 1  Surface Area (cm^2): 42 16  Volume (cm^3): 4 22    Post-debridement measurements  Length (cm): 3 4  Width (cm): 12 4  Depth (cm): 0 1  Percent debrided: 100%  Surface Area (cm^2): 42 16  Area debrided (cm^2): 42 16  Volume (cm^3): 4 22  Devitalized tissue debrided: biofilm, fibrin and alvarado  Instrument(s) utilized: curette  Bleeding: small  Hemostasis obtained with: pressure  Procedural pain (0-10): 0  Post-procedural pain: 0   Response to treatment: procedure was tolerated well          Plan:  1  F/u visit  Thigh wound improving and measuring smaller  Continue current plan of care  2  Patient has an abscess of his left groin with increased erythema of his left lower leg  Concerned for possible infection of his left groin and LLE  Negative Homans sign of LLE  An I&D of his left groin abscess was performed today  Sanguinous purulent drainage was expressed  Will have incision packed with iodoform packing changed every other day  Will also prescribe 100mg doxycycline PO BID x 10 days  Counseled patient to apply warm compresses to left groin  Will also place patient on light duty at work x 1 week d/t patient not being able to get off of work  3  Counseled patient if he develops any worsening fevers, chills, pain, or erythema he needs to be seen in the ED immediately  Patient verbalized agreement and understanding  4  Continue mupirocin ointment to abdomen wound  5  Patient will follow up in 1 week     Wound 03/07/22 Surgical Leg Left;Upper;Medial (Active)   Wound Image Images linked 08/23/22 0844   Wound Description Pink;Granulation tissue; Yellow;Slough; Epithelialization 08/23/22 0844   Sarika-wound Assessment Edema;Pink;Scar Tissue 08/23/22 0844   Wound Length (cm) 3 4 cm 08/23/22 0844   Wound Width (cm) 12 4 cm 08/23/22 0844   Wound Depth (cm) 0 1 cm 08/23/22 0844   Wound Surface Area (cm^2) 42 16 cm^2 08/23/22 0844   Wound Volume (cm^3) 4 216 cm^3 08/23/22 0844   Calculated Wound Volume (cm^3) 4 22 cm^3 08/23/22 0844   Change in Wound Size % 28 23 08/23/22 0844   Drainage Amount Moderate 08/23/22 0844   Drainage Description Serosanguineous 08/23/22 0844   Non-staged Wound Description Full thickness 08/23/22 0844   Dressing Status Intact; New drainage 08/23/22 0844       Wound 04/11/22 Incision Abdomen Left;Medial;Lower (Active)   Wound Image Images linked 08/23/22 0847   Wound Description Pink;Granulation tissue;Slough; Yellow 08/23/22 0847   Sarika-wound Assessment Pink;Scar Tissue; Induration 08/23/22 0847   Wound Length (cm) 0 5 cm 08/23/22 0847   Wound Width (cm) 0 8 cm 08/23/22 0847   Wound Depth (cm) 0 1 cm 08/23/22 0847   Wound Surface Area (cm^2) 0 4 cm^2 08/23/22 0847   Wound Volume (cm^3) 0 04 cm^3 08/23/22 0847   Calculated Wound Volume (cm^3) 0 04 cm^3 08/23/22 0847   Change in Wound Size % 75 08/23/22 0847   Drainage Amount Small 08/23/22 0847   Drainage Description Serous; Yellow 08/23/22 0847   Non-staged Wound Description Full thickness 08/23/22 0847   Dressing Status Other (Comment) (no dressing) 08/23/22 0847       Wound 08/23/22 Incision Thigh Left;Proximal;Medial (Active)   Wound Image Images linked 08/23/22 0920   Wound Description Granulation tissue;Bleeding 08/23/22 0919   Sarika-wound Assessment Induration;Pink 08/23/22 0919   Wound Length (cm) 0 2 cm 08/23/22 0919   Wound Width (cm) 3 cm 08/23/22 0919   Wound Depth (cm) 1 5 cm 08/23/22 0919   Wound Surface Area (cm^2) 0 6 cm^2 08/23/22 0919   Wound Volume (cm^3) 0 9 cm^3 08/23/22 0919   Calculated Wound Volume (cm^3) 0 9 cm^3 08/23/22 0919   Drainage Amount Moderate 08/23/22 0919   Drainage Description Purulent;Bloody 08/23/22 0919   Non-staged Wound Description Full thickness 08/23/22 0919   Dressing Status Other (Comment) (no dressing prior to I&D) 08/23/22 0919       Wound 03/07/22 Surgical Leg Left;Upper;Medial (Active)   Date First Assessed/Time First Assessed: 03/07/22 0833   Primary Wound Type: Surgical  Location: Leg  Wound Location Orientation: Left;Upper;Medial  Wound Outcome: (c)        Wound 04/11/22 Incision Abdomen Left;Medial;Lower (Active)   Date First Assessed: 04/11/22   Primary Wound Type:  Incision  Location: Abdomen  Wound Location Orientation: Left;Medial;Lower       Wound 08/23/22 Incision Thigh Left;Proximal;Medial (Active)   Date First Assessed: 08/23/22   Primary Wound Type: Incision  Location: Thigh  Wound Location Orientation: Left;Proximal;Medial       [REMOVED] Wound 05/26/21 Incision Thigh Left (Removed)   Resolved Date: 03/07/22  Date First Assessed/Time First Assessed: 05/26/21 1914   Pre-Existing Wound: Yes  Primary Wound Type: Incision  Location: Thigh  Wound Location Orientation: Left  Wound Description (Comments): 2 incisions  Incision's 1st Dress    [REMOVED] Wound 05/26/21 Surgical Other (Comment) Groin Left (Removed)   Resolved Date: 03/07/22  Date First Assessed/Time First Assessed: 05/26/21 1916   Pre-Existing Wound: Yes  Primary Wound Type: Surgical  Traumatic Wound Type: (c) Other (Comment)  Location: Groin  Wound Location Orientation: Left  Wound Outcome: Unkno    [REMOVED] Wound 06/01/21 Other (comment) Cellulitis Pretibial Left (Removed)   Resolved Date: 03/07/22  Date First Assessed/Time First Assessed: 06/01/21 1930   Pre-Existing Wound: Yes  Primary Wound Type: Other (comment)  Traumatic Wound Type: Cellulitis  Location: Pretibial  Wound Location Orientation: Left  Dressing Status: C  [REMOVED] Wound 06/02/21 Hip Anterior;Right (Removed)   Resolved Date: 03/07/22  Date First Assessed/Time First Assessed: 06/02/21 1028   Location: Hip  Wound Location Orientation: Anterior;Right  Wound Outcome: Unknown (No longer present)       [REMOVED] Wound 06/02/21 Sacrum (Removed)   Resolved Date: 03/07/22  Date First Assessed/Time First Assessed: 06/02/21 1034   Location: Sacrum  Wound Outcome: Unknown (No longer present)       [REMOVED] Wound 07/06/21 Leg Left (Removed)   Resolved Date: 03/07/22  Date First Assessed/Time First Assessed: 07/06/21 1639   Location: Leg  Wound Location Orientation: Left  Incision's 1st Dressing: KERLIX SUPER SPONGE 6 IN (x2), ACE WRAP 6 IN UNSTERILE (x4), SPONGE GAUZE 4 X 4 16 PLY STRL FÁTIMA           [REMOVED] Wound 03/30/22 Sacrum (Removed)   Resolved Date: 04/11/22  Date First Assessed/Time First Assessed: 03/30/22 1610   Location: Sacrum  Wound Description (Comments): 4/1/22 Stage 2 POA  Wound Outcome: (c) Other (Comment)       [REMOVED] Wound 03/30/22 Surgical Thigh Anterior; Left (Removed)   Resolved Date: 04/11/22  Date First Assessed/Time First Assessed: 03/30/22 1614   Pre-Existing Wound: Yes  Primary Wound Type: Surgical  Location: Thigh  Wound Location Orientation: Anterior; Left  Wound Description (Comments): Surgical Wound  Dressing    [REMOVED] Wound 03/30/22 Abdomen Left (Removed)   Resolved Date: 04/11/22  Date First Assessed/Time First Assessed: 03/30/22 1616   Location: Abdomen  Wound Location Orientation: Left  Wound Outcome: (c) Other (Comment)       [REMOVED] Wound 03/30/22 Thigh Anterior;Right (Removed)   Resolved Date: 04/11/22  Date First Assessed/Time First Assessed: 03/30/22 1617   Location: Thigh  Wound Location Orientation: Anterior;Right  Wound Outcome: (c) Other (Comment)       [REMOVED] Wound 03/30/22 Abscess Cellulitis Thigh Anterior;Left;Proximal (Removed)   Resolved Date: 04/11/22  Date First Assessed/Time First Assessed: 03/30/22 1618   Primary Wound Type: Abscess  Traumatic Wound Type: Cellulitis  Location: Thigh  Wound Location Orientation: Anterior;Left;Proximal  Dressing Status: New drainage  Wound     [REMOVED] Wound 05/04/22 Leg Left; Inner; Lower (Removed)   Resolved Date: 06/21/22  Date First Assessed/Time First Assessed: 05/04/22 2200   Pre-Existing Wound: Yes  Location: Leg  Wound Location Orientation: Left; Inner; Lower  Wound Outcome: (c) Other (Comment)       [REMOVED] Wound 05/04/22 Other (comment) Sacrum (Removed)   Resolved Date: 06/21/22  Date First Assessed/Time First Assessed: 05/04/22 0001   Pre-Existing Wound: Yes  Primary Wound Type: Other (comment)  Location: Sacrum  Wound Description (Comments): Pt states wound is closed/healed & declined assessment 6/21           Subjective:     F/u visit non-healing surgical wound of left thigh and open wound of abdomen  Patient presents today c/o increased erythema of his LLE and an abscess of his left groin  He reports the increased erythema started last Thursday  He reports he had increased fevers and chills and was out of work for a day  He did not seek treatment d/t feeling better the next day  He reports the abscess of his left groin developed a few days ago  No new complaints with his current wounds  He currently denies any pain, fevers, or chills          The following portions of the patient's history were reviewed and updated as appropriate:   He  has a past medical history of Arthritis, Breathing difficulty, Coronary artery disease, Diabetes mellitus ([facility] [address] ), Diabetic neuropathy ([facility] [address] ) (5/28/2021), Heart disease, Hidradenitis suppurativa, History of transfusion, Hyperlipidemia, Hypertension, MI (myocardial infarction) ([facility] [address] ), Morbid obesity with BMI of 50 0-59 9, adult ([facility] [address] ), Nicotine dependence, Obesity, and Pilonidal cyst   He   Patient Active Problem List    Diagnosis Date Noted    Lymphedema of both lower extremities 06/15/2022    History of coronary angioplasty with insertion of stent 06/13/2022    Colitis 05/04/2022    Abscess of multiple sites of buttock 05/04/2022    Anemia 04/01/2022    Swelling of left lower extremity 03/30/2022    History of hidradenitis suppurativa 03/30/2022    Nocturnal hypoxia 12/04/2021    Cellulitis of multiple sites of trunk 12/02/2021    Chest pain 12/02/2021    Cellulitis of left lower extremity 06/23/2021    H/O drainage of abscess 06/21/2021    Difficult intubation 05/28/2021    Morbid obesity with body mass index (BMI) of 50 0 to 59 9 in adult [facility]) 03/10/2021    Type 1 non-ST elevation myocardial infarction (NSTEMI) ([address] ) 11/29/2020    Left groin mass 11/29/2020    Type 2 diabetes mellitus with diabetic polyneuropathy, without long-term current use of insulin ([facility] [address] ) 07/26/2018    Dyslipidemia 01/23/2017    Pilonidal cyst 12/13/2016    Essential hypertension 01/14/2016    Abnormal liver enzymes 09/23/2014    Coronary artery disease 06/25/2014     He  has a past surgical history that includes Leg Surgery (Left); Coronary angioplasty with stent; Incision and drainage of wound (N/A, 1/24/2017); pr exc skin benig >4 cm trunk,arm,leg (Left, 4/6/2021); pr neg press wound tx, < 50 cm (Left, 4/6/2021); Wound debridement (Left, 4/17/2021); Wound debridement (Left, 4/22/2021); Wound debridement (Left, 5/26/2021); Wound debridement (Left, 5/28/2021); pr debridement, skin, sub-q tissue,=<20 sq cm (Left, 7/6/2021); pr exc sweat gland lesn perineal,complx (Left, 7/6/2021); and Cardiac surgery  His family history includes Diabetes in his father; Heart disease in his father and mother; Hypertension in his mother  He  reports that he quit smoking about 17 months ago  His smoking use included cigarettes  He started smoking about 19 months ago  He has a 30 00 pack-year smoking history  He has never used smokeless tobacco  He reports previous alcohol use  He reports that he does not use drugs    Current Outpatient Medications   Medication Sig Dispense Refill    doxycycline hyclate (VIBRAMYCIN) 100 mg capsule Take 1 capsule (100 mg total) by mouth every 12 (twelve) hours for 10 days 20 capsule 0    albuterol (ProAir HFA) 90 mcg/act inhaler Inhale 2 puffs every 6 (six) hours as needed for wheezing 8 5 g 3    amLODIPine (NORVASC) 10 mg tablet Take 1 tablet (10 mg total) by mouth daily 90 tablet 1    aspirin (ECOTRIN LOW STRENGTH) 81 mg EC tablet Take 1 tablet (81 mg total) by mouth daily 30 tablet 0    atorvastatin (LIPITOR) 80 mg tablet Take 1 tablet (80 mg total) by mouth daily 90 tablet 1    carvedilol (COREG) 6 25 mg tablet Take 1 tablet (6 25 mg total) by mouth 2 (two) times a day with meals 180 tablet 1    Insulin Pen Needle (PEN NEEDLES 29GX1/2") 29G X 12MM MISC Use 1 needle daily for subcutaneous injections (Patient not taking: No sig reported) 50 each 3    liraglutide (VICTOZA) injection Inject 0 1 mL (0 6 mg total) under the skin daily Start at 0 6 mg daily for a week, then increase to 1 2 mg daily on the second week, then increase to 1 8 mg daily on the third week and going forward  3 mL 3    lisinopril (ZESTRIL) 20 mg tablet Take 1 tablet (20 mg total) by mouth daily 90 tablet 1    metFORMIN (GLUCOPHAGE) 1000 MG tablet Take 1 tablet (1,000 mg total) by mouth 2 (two) times a day with meals 180 tablet 1    mupirocin (BACTROBAN) 2 % ointment Apply topically daily 22 g 2    Omega-3 Fatty Acids (fish oil) 1,000 mg Take 1 capsule (1,000 mg total) by mouth 2 (two) times a day 180 capsule 2    oxyCODONE-acetaminophen (PERCOCET) 5-325 mg per tablet       ticagrelor (Brilinta) 90 MG Take 1 tablet (90 mg total) by mouth every 12 (twelve) hours (Patient not taking: No sig reported) 180 tablet 2    zinc sulfate (ZINCATE) 220 mg capsule Take 1 capsule (220 mg total) by mouth daily 30 capsule 0     No current facility-administered medications for this visit  He is allergic to amoxicillin       Review of Systems   Constitutional: Positive for chills and fever  HENT: Negative for ear pain and hearing loss  Eyes: Negative for pain  Respiratory: Negative for chest tightness and shortness of breath  Cardiovascular: Positive for leg swelling  Negative for chest pain and palpitations  LLE   Gastrointestinal: Negative for diarrhea, nausea and vomiting  Genitourinary: Negative for dysuria  Musculoskeletal: Negative for gait problem  Skin: Positive for color change and wound  Increased erythema of his LLE   Neurological: Negative for tremors and weakness  Psychiatric/Behavioral: Negative for behavioral problems, confusion and suicidal ideas           Objective:       Wound 03/07/22 Surgical Leg Left;Upper;Medial (Active)   Wound Image Images linked 08/23/22 6888   Wound Description Pink;Granulation tissue; Yellow;Slough; Epithelialization 08/23/22 0844   Sarika-wound Assessment Edema;Pink;Scar Tissue 08/23/22 0844   Wound Length (cm) 3 4 cm 08/23/22 0844   Wound Width (cm) 12 4 cm 08/23/22 0844   Wound Depth (cm) 0 1 cm 08/23/22 0844   Wound Surface Area (cm^2) 42 16 cm^2 08/23/22 0844   Wound Volume (cm^3) 4 216 cm^3 08/23/22 0844   Calculated Wound Volume (cm^3) 4 22 cm^3 08/23/22 0844   Change in Wound Size % 28 23 08/23/22 0844   Drainage Amount Moderate 08/23/22 0844   Drainage Description Serosanguineous 08/23/22 0844   Non-staged Wound Description Full thickness 08/23/22 0844   Dressing Status Intact; New drainage 08/23/22 0844       Wound 04/11/22 Incision Abdomen Left;Medial;Lower (Active)   Wound Image Images linked 08/23/22 0847   Wound Description Pink;Granulation tissue;Slough; Yellow 08/23/22 0847   Sarika-wound Assessment Pink;Scar Tissue; Induration 08/23/22 0847   Wound Length (cm) 0 5 cm 08/23/22 0847   Wound Width (cm) 0 8 cm 08/23/22 0847   Wound Depth (cm) 0 1 cm 08/23/22 0847   Wound Surface Area (cm^2) 0 4 cm^2 08/23/22 0847   Wound Volume (cm^3) 0 04 cm^3 08/23/22 0847   Calculated Wound Volume (cm^3) 0 04 cm^3 08/23/22 0847   Change in Wound Size % 75 08/23/22 0847   Drainage Amount Small 08/23/22 0847   Drainage Description Serous; Yellow 08/23/22 0847   Non-staged Wound Description Full thickness 08/23/22 0847   Dressing Status Other (Comment) (no dressing) 08/23/22 0847       Wound 08/23/22 Incision Thigh Left;Proximal;Medial (Active)   Wound Image Images linked 08/23/22 0920   Wound Description Granulation tissue;Bleeding 08/23/22 0919   Sarika-wound Assessment Induration;Pink 08/23/22 0919   Wound Length (cm) 0 2 cm 08/23/22 0919   Wound Width (cm) 3 cm 08/23/22 0919   Wound Depth (cm) 1 5 cm 08/23/22 0919   Wound Surface Area (cm^2) 0 6 cm^2 08/23/22 0919   Wound Volume (cm^3) 0 9 cm^3 08/23/22 0919   Calculated Wound Volume (cm^3) 0 9 cm^3 08/23/22 0919   Drainage Amount Moderate 08/23/22 8125   Drainage Description Purulent;Bloody 08/23/22 0919   Non-staged Wound Description Full thickness 08/23/22 0919   Dressing Status Other (Comment) (no dressing prior to I&D) 08/23/22 0919       /75   Pulse 75   Temp 98 2 °F (36 8 °C)   Resp 18                     Wound Instructions:  Orders Placed This Encounter   Procedures    Wound cleansing and dressings     Left Upper Left Leg/groin:      Wash your hands with soap and water  Remove old dressing, discard into plastic bag and place in trash  Cleanse the wound with soap and water (cleaned with Prophase today)prior to applying a clean dressing  Do not use tissue or cotton balls  Do not scrub the wound  Pat dry using gauze  Shower yes   Apply skin prep to skin surrounding wound  Apply Aquacel AG to the leg/groin wound  Cover with ABD  Secure with Medipore tape  Change dressing daily            This was done today      Wait 24 hrs until next shower (post debridement)                                                   Standing Status:   Future     Standing Expiration Date:   8/23/2023    Wound compression and edema control     Continue using your lymphedema pump 1-2 times per day  Standing Status:   Future     Standing Expiration Date:   8/23/2023    Wound miscellaneous orders     Antibiotic ordered today  Take as directed  If you develop Fever, chills and don't feel well - go to ER  Standing Status:   Future     Standing Expiration Date:   8/23/2023    Wound cleansing and dressings     Lower abdominal wound:        Apply warm compresses to the abdominal wound 2x/day until compress cools down  Cover with Wash your hands with soap and water  Remove old dressing, discard into plastic bag and place in trash  Cleanse the wound with soap and water prior to applying a clean dressing  Do not use tissue or cotton balls  Do not scrub the wound  Pat dry using gauze    Shower yes      Apply warm compress to the abdominal  Wound until it cools  Cover with gauze as long as it is draining  Secure with medipore tape  Change dressing daily      This was done today           Standing Status:   Future     Standing Expiration Date:   8/23/2023    Wound cleansing and dressings     Left Leg incision    Wash your hands with soap and water  Remove old dressing, discard into plastic bag and place in trash  Cleanse the wound with normal saline prior to applying a clean dressing  Do not use tissue or cotton balls  Do not scrub the wound  Pat dry using gauze  Shower yes - with wound covered  Apply skin prep to skin surrounding wound  Lightly pack the wound with Iodoform packing  Cover with border gauze   Secure with Tegaderm  Change dressing daily    This was done today    Apply Warm compress to the incision 1-2 x day  Standing Status:   Future     Standing Expiration Date:   8/23/2023        Diagnosis ICD-10-CM Associated Orders   1  Non-healing surgical wound, initial encounter  [id number] [id number] Wound cleansing and dressings     Wound compression and edema control     Wound miscellaneous orders     Wound cleansing and dressings     Wound cleansing and dressings   2  Acquired lymphedema of leg  I89 0 Wound cleansing and dressings     Wound compression and edema control     Wound miscellaneous orders     Wound cleansing and dressings     Wound cleansing and dressings   3  Morbid obesity due to excess calories ([facility])  E66 01 Wound cleansing and dressings     Wound compression and edema control     Wound miscellaneous orders     Wound cleansing and dressings     Wound cleansing and dressings   4  History of hidradenitis suppurativa  L73 2 Wound cleansing and dressings     Wound compression and edema control     Wound miscellaneous orders     Wound cleansing and dressings     Wound cleansing and dressings   5   Type 2 diabetes mellitus with diabetic polyneuropathy, without long-term current use of insulin ([facility])  E11 42 Wound cleansing and dressings Wound compression and edema control     Wound miscellaneous orders     Wound cleansing and dressings     Wound cleansing and dressings   6  Abscess of left groin  L02 214 doxycycline hyclate (VIBRAMYCIN) 100 mg capsule   7   Cellulitis of left lower leg  L03 116 doxycycline hyclate (VIBRAMYCIN) 100 mg capsule

## 2022-08-23 NOTE — LETTER
August 23, 2022     Patient: Army Maki  YOB: 1980  Date of Visit: 8/23/2022      To Whom it May Concern:    Erik Player is under my professional care  Estrada Girard was seen in my office on 8/23/2022  Estrada Girard may return to work with limitations no heavy lifting or strenuous work for 1 week  If you have any questions or concerns, please don't hesitate to call           Sincerely,          PETRA Hidalgo        CC: No Recipients

## 2022-08-23 NOTE — PATIENT INSTRUCTIONS
Orders Placed This Encounter   Procedures    Wound cleansing and dressings     Left Upper Left Leg/groin:      Wash your hands with soap and water  Remove old dressing, discard into plastic bag and place in trash  Cleanse the wound with soap and water (cleaned with Prophase today)prior to applying a clean dressing  Do not use tissue or cotton balls  Do not scrub the wound  Pat dry using gauze  Shower yes   Apply skin prep to skin surrounding wound  Apply Aquacel AG to the leg/groin wound  Cover with ABD  Secure with Medipore tape  Change dressing daily  This was done today  Wait 24 hrs until next shower (post debridement)  Standing Status:   Future     Standing Expiration Date:   8/23/2023    Wound compression and edema control     Continue using your lymphedema pump 1-2 times per day  Standing Status:   Future     Standing Expiration Date:   8/23/2023    Wound miscellaneous orders     Antibiotic ordered today  Take as directed  If you develop Fever, chills and don't feel well - go to ER  Standing Status:   Future     Standing Expiration Date:   8/23/2023    Wound cleansing and dressings     Lower abdominal wound:        Apply warm compresses to the abdominal wound 2x/day until compress cools down  Cover with Wash your hands with soap and water  Remove old dressing, discard into plastic bag and place in trash  Cleanse the wound with soap and water prior to applying a clean dressing  Do not use tissue or cotton balls  Do not scrub the wound  Pat dry using gauze  Shower yes      Apply warm compress to the abdominal  Wound until it cools  Cover with gauze as long as it is draining  Secure with medipore tape  Change dressing daily       This was done today           Standing Status:   Future     Standing Expiration Date:   8/23/2023    Wound cleansing and dressings     Left Leg incision    Wash your hands with soap and water   Remove old dressing, discard into plastic bag and place in trash  Cleanse the wound with normal saline prior to applying a clean dressing  Do not use tissue or cotton balls  Do not scrub the wound  Pat dry using gauze  Shower yes - with wound covered  Apply skin prep to skin surrounding wound  Lightly pack the wound with Iodoform packing  Cover with border gauze   Secure with Tegaderm  Change dressing daily    This was done today    Apply Warm compress to the incision 1-2 x day       Standing Status:   Future     Standing Expiration Date:   8/23/2023

## 2022-08-24 ENCOUNTER — OFFICE VISIT (OUTPATIENT)
Dept: PHYSICAL THERAPY | Facility: CLINIC | Age: 42
End: 2022-08-24
Payer: COMMERCIAL

## 2022-08-24 ENCOUNTER — APPOINTMENT (OUTPATIENT)
Dept: PHYSICAL THERAPY | Facility: CLINIC | Age: 42
End: 2022-08-24
Payer: COMMERCIAL

## 2022-08-24 DIAGNOSIS — S81.802S NON-HEALING WOUND OF LOWER EXTREMITY, LEFT, SEQUELA: ICD-10-CM

## 2022-08-24 DIAGNOSIS — I89.0 LYMPHEDEMA OF BOTH LOWER EXTREMITIES: ICD-10-CM

## 2022-08-24 DIAGNOSIS — I89.0 LYMPHEDEMA OF LEFT LOWER EXTREMITY: Primary | ICD-10-CM

## 2022-08-24 PROCEDURE — 97140 MANUAL THERAPY 1/> REGIONS: CPT | Performed by: PHYSICAL THERAPIST

## 2022-08-24 NOTE — PROGRESS NOTES
Daily Note  IE [address]     Today's date: 2022  Patient name: Jorden Suazo  : 1980  MRN: [id number]  Referring provider: Erika Ramos MD  Dx:   Encounter Diagnosis     ICD-10-CM    1  Lymphedema of left lower extremity  I89 0    2  Non-healing wound of lower extremity, left, sequela  S81 802S    3  Lymphedema of both lower extremities  I89 0                   Subjective: Alfonso Handley reports "okay" upon arrival to therapy today  Objective: See treatment diary below      Assessment: Tolerated treatment fair  Patient would benefit from continued PT  PT     Plan: Continue per plan of care  Progress treatment as tolerated             Precautions: Chronic swelling, chronic infections, BMI > 30,   HEP: review educational literature provided by PT, order short stretch bandages as highlighted by PT   Daily Treatment Diary     Manuals           L LE MLD sequence  40' total  40' total                                     Therapeutic Exercise             Bike              Hip abduction             Hip flexion             Heel raises             Toe raises             Lateral walking             Bridges                                                                                                        Self-care/ Home management             Lymphedema education  8'

## 2022-08-25 ENCOUNTER — APPOINTMENT (OUTPATIENT)
Dept: PHYSICAL THERAPY | Facility: CLINIC | Age: 42
End: 2022-08-25
Payer: COMMERCIAL

## 2022-08-30 ENCOUNTER — OFFICE VISIT (OUTPATIENT)
Dept: WOUND CARE | Facility: HOSPITAL | Age: 42
End: 2022-08-30
Payer: COMMERCIAL

## 2022-08-30 VITALS
HEART RATE: 73 BPM | RESPIRATION RATE: 18 BRPM | DIASTOLIC BLOOD PRESSURE: 87 MMHG | SYSTOLIC BLOOD PRESSURE: 150 MMHG | TEMPERATURE: 96.7 F

## 2022-08-30 DIAGNOSIS — L02.214 ABSCESS OF LEFT GROIN: ICD-10-CM

## 2022-08-30 DIAGNOSIS — E66.01 MORBID OBESITY DUE TO EXCESS CALORIES (HCC): ICD-10-CM

## 2022-08-30 DIAGNOSIS — T81.89XA NON-HEALING SURGICAL WOUND, INITIAL ENCOUNTER: Primary | ICD-10-CM

## 2022-08-30 DIAGNOSIS — I89.0 ACQUIRED LYMPHEDEMA OF LEG: ICD-10-CM

## 2022-08-30 DIAGNOSIS — L03.116 CELLULITIS OF LEFT LOWER LEG: ICD-10-CM

## 2022-08-30 DIAGNOSIS — E11.42 TYPE 2 DIABETES MELLITUS WITH DIABETIC POLYNEUROPATHY, WITHOUT LONG-TERM CURRENT USE OF INSULIN (HCC): ICD-10-CM

## 2022-08-30 DIAGNOSIS — L73.2 HIDRADENITIS SUPPURATIVA: ICD-10-CM

## 2022-08-30 PROCEDURE — 97598 DBRDMT OPN WND ADDL 20CM/<: CPT | Performed by: NURSE PRACTITIONER

## 2022-08-30 PROCEDURE — 99213 OFFICE O/P EST LOW 20 MIN: CPT | Performed by: NURSE PRACTITIONER

## 2022-08-30 PROCEDURE — 97597 DBRDMT OPN WND 1ST 20 CM/<: CPT | Performed by: NURSE PRACTITIONER

## 2022-08-30 RX ORDER — LIDOCAINE HYDROCHLORIDE 40 MG/ML
5 SOLUTION TOPICAL ONCE
Status: COMPLETED | OUTPATIENT
Start: 2022-08-30 | End: 2022-08-30

## 2022-08-30 RX ORDER — DOXYCYCLINE HYCLATE 100 MG/1
100 CAPSULE ORAL EVERY 12 HOURS SCHEDULED
Qty: 20 CAPSULE | Refills: 0 | OUTPATIENT
Start: 2022-08-30 | End: 2022-09-07

## 2022-08-30 RX ADMIN — LIDOCAINE HYDROCHLORIDE 5 ML: 40 SOLUTION TOPICAL at 08:55

## 2022-08-30 NOTE — PROGRESS NOTES
Patient ID: Lisa Hale is a 40 y o  male Date of Birth 1980     Chief Complaint  Chief Complaint   Patient presents with    Follow Up Wound Care Visit     Surgical wound       Allergies  Amoxicillin    Assessment:     Diagnoses and all orders for this visit:    Non-healing surgical wound, initial encounter  -     lidocaine (XYLOCAINE) 4 % topical solution 5 mL  -     Wound cleansing and dressings; Future  -     Cancel: Wound cleansing and dressings; Future  -     Wound compression and edema control; Future  -     Wound miscellaneous orders; Future  -     Wound cleansing and dressings; Future  -     mupirocin (BACTROBAN) 2 % ointment; Apply topically daily  -     Wound cleansing and dressings; Future  -     Debridement  -     Debridement    Acquired lymphedema of leg  -     lidocaine (XYLOCAINE) 4 % topical solution 5 mL  -     Wound cleansing and dressings; Future  -     Cancel: Wound cleansing and dressings; Future  -     Wound compression and edema control; Future  -     Wound miscellaneous orders; Future  -     Wound cleansing and dressings; Future  -     Wound cleansing and dressings; Future    Abscess of left groin  -     doxycycline hyclate (VIBRAMYCIN) 100 mg capsule; Take 1 capsule (100 mg total) by mouth every 12 (twelve) hours for 10 days  -     Ambulatory Referral to Dermatology; Future  -     Debridement    Cellulitis of left lower leg  -     doxycycline hyclate (VIBRAMYCIN) 100 mg capsule; Take 1 capsule (100 mg total) by mouth every 12 (twelve) hours for 10 days    Morbid obesity due to excess calories ([address] )    History of hidradenitis suppurativa    Type 2 diabetes mellitus with diabetic polyneuropathy, without long-term current use of insulin ([address] )              Debridement   Wound 03/07/22 Surgical Leg Left;Upper;Medial    Universal Protocol:  Consent: Written consent obtained    Consent given by: patient  Time out: Immediately prior to procedure a "time out" was called to verify the correct patient, procedure, equipment, support staff and site/side marked as required  Timeout called at: 8/30/2022 9:48 AM   Patient identity confirmed: verbally with patient      Performed by: NP  Debridement type: selective  Pain control: lidocaine 4%  Pre-debridement measurements  Length (cm): 3 4  Width (cm): 11 7  Depth (cm): 0 1  Surface Area (cm^2): 39 78  Volume (cm^3): 3 98    Post-debridement measurements  Length (cm): 3 4  Width (cm): 11 7  Depth (cm): 0 1  Percent debrided: 100%  Surface Area (cm^2): 39 78  Area debrided (cm^2): 39 78  Volume (cm^3): 3 98  Devitalized tissue debrided: biofilm, fibrin and slough  Instrument(s) utilized: curette  Bleeding: small  Hemostasis obtained with: pressure  Procedural pain (0-10): 0  Post-procedural pain: 0   Response to treatment: procedure was tolerated well    Debridement   Wound 04/11/22 Incision Abdomen Left;Medial;Lower    Universal Protocol:  Consent: Written consent obtained  Consent given by: patient  Time out: Immediately prior to procedure a "time out" was called to verify the correct patient, procedure, equipment, support staff and site/side marked as required    Timeout called at: 8/30/2022 9:49 AM   Patient identity confirmed: verbally with patient      Performed by: NP  Debridement type: selective  Pain control: lidocaine 4%  Pre-debridement measurements  Length (cm): 2 6  Width (cm): 2  Depth (cm): 0 1  Surface Area (cm^2): 5 2  Volume (cm^3): 0 52    Post-debridement measurements  Length (cm): 2 6  Width (cm): 2  Depth (cm): 0 1  Percent debrided: 100%  Surface Area (cm^2): 5 2  Area debrided (cm^2): 5 2  Volume (cm^3): 0 52  Devitalized tissue debrided: biofilm, fibrin and slough  Instrument(s) utilized: curette  Bleeding: small  Hemostasis obtained with: pressure  Procedural pain (0-10): 0  Post-procedural pain: 0   Response to treatment: procedure was tolerated well    Debridement   Wound 08/23/22 Incision Thigh Left;Proximal;Medial Universal Protocol:  Consent: Written consent obtained  Consent given by: patient  Time out: Immediately prior to procedure a "time out" was called to verify the correct patient, procedure, equipment, support staff and site/side marked as required  Timeout called at: 8/30/2022 9:50 AM   Patient identity confirmed: verbally with patient      Performed by: NP  Debridement type: selective  Pain control: lidocaine 4%  Pre-debridement measurements  Length (cm): 0 1  Width (cm): 1 6  Depth (cm): 0 8  Surface Area (cm^2): 0 16  Volume (cm^3): 0 13    Post-debridement measurements  Length (cm): 0 1  Width (cm): 1 6  Depth (cm): 0 8  Percent debrided: 100%  Surface Area (cm^2): 0 16  Area debrided (cm^2): 0 16  Volume (cm^3): 0 13  Devitalized tissue debrided: biofilm, fibrin and slough  Instrument(s) utilized: curette  Bleeding: small  Hemostasis obtained with: pressure  Procedural pain (0-10): 0  Post-procedural pain: 0   Response to treatment: procedure was tolerated well          Plan:  1  F/u visit  Wounds debrided  Thigh and groin wounds are improving and measuring smaller  Continue current plan of care  2  LLE cellulitis resolved  Groin abscess is less indurated on palpation, erythema has improved and wound is healing  3  Was able to palpate induration around patient's abdominal wound and was able to express some purulent drainage  Patient denied any pain on palpation  Will renew patient's doxycycline for an additional 10 days and will also renew the mupirocin ointment to be applied to abdominal wound daily  Counseled patient to also apply warm compresses to area 1-2 times per day  4  Will also place an ambulatory referral for patient to be seen by Dermatology d/t patient's frequent hx of skin infections  5  Patient is requesting he return to the wound center in 5 weeks d/t his work schedule  Patient reports he is unable to return in the recommended 2-3 weeks   Counseled patient to call wound center or go to the nearest ER or urgent care for any increased pain, fevers, chills, or erythema  Patient verbalized agreement and understanding       Wound 03/07/22 Surgical Leg Left;Upper;Medial (Active)   Wound Image Images linked 08/30/22 0846   Wound Description Pink;Granulation tissue; Yellow;Slough; Epithelialization 08/30/22 0846   Sarika-wound Assessment Edema;Pink;Scar Tissue 08/30/22 0846   Wound Length (cm) 3 4 cm 08/30/22 0846   Wound Width (cm) 11 7 cm 08/30/22 0846   Wound Depth (cm) 0 1 cm 08/30/22 0846   Wound Surface Area (cm^2) 39 78 cm^2 08/30/22 0846   Wound Volume (cm^3) 3 978 cm^3 08/30/22 0846   Calculated Wound Volume (cm^3) 3 98 cm^3 08/30/22 0846   Change in Wound Size % 32 31 08/30/22 0846   Undermining 0 3 08/30/22 0846   Undermining is depth extending from 6-9 oclock 08/30/22 0846   Drainage Amount Moderate 08/30/22 0846   Drainage Description Serosanguineous 08/30/22 0846   Non-staged Wound Description Full thickness 08/30/22 0846   Dressing Status Intact; New drainage 08/30/22 0846       Wound 04/11/22 Incision Abdomen Left;Medial;Lower (Active)   Wound Image Images linked 08/30/22 0853   Wound Description Granulation tissue;Slough; Yellow;Epithelialization 08/30/22 0853   Sarika-wound Assessment Pink;Scar Tissue; Induration 08/30/22 0853   Wound Length (cm) 2 6 cm (boxed measurement   2 open areas measured as one wound) 08/30/22 0853   Wound Width (cm) 2 cm 08/30/22 0853   Wound Depth (cm) 0 1 cm 08/30/22 0853   Wound Surface Area (cm^2) 5 2 cm^2 08/30/22 0853   Wound Volume (cm^3) 0 52 cm^3 08/30/22 0853   Calculated Wound Volume (cm^3) 0 52 cm^3 08/30/22 0853   Change in Wound Size % -225 08/30/22 0853   Drainage Amount Small 08/30/22 0853   Drainage Description Purulent;Serous 08/30/22 0853   Non-staged Wound Description Full thickness 08/30/22 0853   Dressing Status Other (Comment) (no dressing) 08/30/22 0853       Wound 08/23/22 Incision Thigh Left;Proximal;Medial (Active)   Wound Image Images linked 08/30/22 0850   Wound Description Granulation tissue;Pink;Slough; Yellow 08/30/22 0850   Sarika-wound Assessment Induration;Pink 08/30/22 0850   Wound Length (cm) 0 1 cm 08/30/22 0850   Wound Width (cm) 1 6 cm 08/30/22 0850   Wound Depth (cm) 0 8 cm 08/30/22 0850   Wound Surface Area (cm^2) 0 16 cm^2 08/30/22 0850   Wound Volume (cm^3) 0 128 cm^3 08/30/22 0850   Calculated Wound Volume (cm^3) 0 13 cm^3 08/30/22 0850   Change in Wound Size % 85 56 08/30/22 0850   Drainage Amount Moderate 08/30/22 0850   Drainage Description Serosanguineous 08/30/22 0850   Non-staged Wound Description Full thickness 08/30/22 0850   Dressing Status Intact; New drainage 08/30/22 0850       Wound 03/07/22 Surgical Leg Left;Upper;Medial (Active)   Date First Assessed/Time First Assessed: 03/07/22 0833   Primary Wound Type: Surgical  Location: Leg  Wound Location Orientation: Left;Upper;Medial  Wound Outcome: (c)        Wound 04/11/22 Incision Abdomen Left;Medial;Lower (Active)   Date First Assessed: 04/11/22   Primary Wound Type: Incision  Location: Abdomen  Wound Location Orientation: Left;Medial;Lower       Wound 08/23/22 Incision Thigh Left;Proximal;Medial (Active)   Date First Assessed: 08/23/22   Primary Wound Type: Incision  Location: Thigh  Wound Location Orientation: Left;Proximal;Medial       [REMOVED] Wound 05/26/21 Incision Thigh Left (Removed)   Resolved Date: 03/07/22  Date First Assessed/Time First Assessed: 05/26/21 1914   Pre-Existing Wound: Yes  Primary Wound Type: Incision  Location: Thigh  Wound Location Orientation: Left  Wound Description (Comments): 2 incisions  Incision's 1st Dress    [REMOVED] Wound 05/26/21 Surgical Other (Comment) Groin Left (Removed)   Resolved Date: 03/07/22  Date First Assessed/Time First Assessed: 05/26/21 1916   Pre-Existing Wound: Yes  Primary Wound Type: Surgical  Traumatic Wound Type: (c) Other (Comment)  Location: Groin  Wound Location Orientation: Left  Wound Outcome: Unkno    [REMOVED] Wound 06/01/21 Other (comment) Cellulitis Pretibial Left (Removed)   Resolved Date: 03/07/22  Date First Assessed/Time First Assessed: 06/01/21 1930   Pre-Existing Wound: Yes  Primary Wound Type: Other (comment)  Traumatic Wound Type: Cellulitis  Location: Pretibial  Wound Location Orientation: Left  Dressing Status: C  [REMOVED] Wound 06/02/21 Hip Anterior;Right (Removed)   Resolved Date: 03/07/22  Date First Assessed/Time First Assessed: 06/02/21 1028   Location: Hip  Wound Location Orientation: Anterior;Right  Wound Outcome: Unknown (No longer present)       [REMOVED] Wound 06/02/21 Sacrum (Removed)   Resolved Date: 03/07/22  Date First Assessed/Time First Assessed: 06/02/21 1034   Location: Sacrum  Wound Outcome: Unknown (No longer present)       [REMOVED] Wound 07/06/21 Leg Left (Removed)   Resolved Date: 03/07/22  Date First Assessed/Time First Assessed: 07/06/21 1639   Location: Leg  Wound Location Orientation: Left  Incision's 1st Dressing: KERLIX SUPER SPONGE 6 IN (x2), ACE WRAP 6 IN UNSTERILE (x4), SPONGE GAUZE 4 X 4 16 PLY STRL FÁTIMA    [REMOVED] Wound 03/30/22 Sacrum (Removed)   Resolved Date: 04/11/22  Date First Assessed/Time First Assessed: 03/30/22 1610   Location: Sacrum  Wound Description (Comments): 4/1/22 Stage 2 POA  Wound Outcome: (c) Other (Comment)       [REMOVED] Wound 03/30/22 Surgical Thigh Anterior; Left (Removed)   Resolved Date: 04/11/22  Date First Assessed/Time First Assessed: 03/30/22 1614   Pre-Existing Wound: Yes  Primary Wound Type: Surgical  Location: Thigh  Wound Location Orientation: Anterior; Left  Wound Description (Comments): Surgical Wound  Dressing           [REMOVED] Wound 03/30/22 Abdomen Left (Removed)   Resolved Date: 04/11/22  Date First Assessed/Time First Assessed: 03/30/22 1616   Location: Abdomen  Wound Location Orientation: Left  Wound Outcome: (c) Other (Comment)       [REMOVED] Wound 03/30/22 Thigh Anterior;Right (Removed)   Resolved Date: 04/11/22  Date First Assessed/Time First Assessed: 03/30/22 1617   Location: Thigh  Wound Location Orientation: Anterior;Right  Wound Outcome: (c) Other (Comment)       [REMOVED] Wound 03/30/22 Abscess Cellulitis Thigh Anterior;Left;Proximal (Removed)   Resolved Date: 04/11/22  Date First Assessed/Time First Assessed: 03/30/22 1618   Primary Wound Type: Abscess  Traumatic Wound Type: Cellulitis  Location: Thigh  Wound Location Orientation: Anterior;Left;Proximal  Dressing Status: New drainage  Wound     [REMOVED] Wound 05/04/22 Leg Left; Inner; Lower (Removed)   Resolved Date: 06/21/22  Date First Assessed/Time First Assessed: 05/04/22 2200   Pre-Existing Wound: Yes  Location: Leg  Wound Location Orientation: Left; Inner; Lower  Wound Outcome: (c) Other (Comment)       [REMOVED] Wound 05/04/22 Other (comment) Sacrum (Removed)   Resolved Date: 06/21/22  Date First Assessed/Time First Assessed: 05/04/22 0001   Pre-Existing Wound: Yes  Primary Wound Type: Other (comment)  Location: Sacrum  Wound Description (Comments): Pt states wound is closed/healed & declined assessment 6/21    Subjective:     F/u visit multiple wounds  No new complaints  He denies any pain, fevers, or chills  Patient reports he has been taking the doxycyline which was prescribed at last week's wound care appointment as prescribed         The following portions of the patient's history were reviewed and updated as appropriate:   He  has a past medical history of Arthritis, Breathing difficulty, Coronary artery disease, Diabetes mellitus ([facility] ), Diabetic neuropathy ([facility] [address] ) (5/28/2021), Heart disease, Hidradenitis suppurativa, History of transfusion, Hyperlipidemia, Hypertension, MI (myocardial infarction) ([facility] [address] ), Morbid obesity with BMI of 50 0-59 9, adult ([facility] [address] ), Nicotine dependence, Obesity, and Pilonidal cyst   He   Patient Active Problem List    Diagnosis Date Noted    Lymphedema of both lower extremities 06/15/2022    History of coronary angioplasty with insertion of stent 06/13/2022    Colitis 05/04/2022    Abscess of multiple sites of buttock 05/04/2022    Anemia 04/01/2022    Swelling of left lower extremity 03/30/2022    History of hidradenitis suppurativa 03/30/2022    Nocturnal hypoxia 12/04/2021    Cellulitis of multiple sites of trunk 12/02/2021    Chest pain 12/02/2021    Cellulitis of left lower extremity 06/23/2021    H/O drainage of abscess 06/21/2021    Difficult intubation 05/28/2021    Morbid obesity with body mass index (BMI) of 50 0 to 59 9 in adult [facility]) 03/10/2021    Type 1 non-ST elevation myocardial infarction (NSTEMI) ([facility] ) 11/29/2020    Left groin mass 11/29/2020    Type 2 diabetes mellitus with diabetic polyneuropathy, without long-term current use of insulin ([facility] ) 07/26/2018    Dyslipidemia 01/23/2017    Pilonidal cyst 12/13/2016    Essential hypertension 01/14/2016    Abnormal liver enzymes 09/23/2014    Coronary artery disease 06/25/2014     He  has a past surgical history that includes Leg Surgery (Left); Coronary angioplasty with stent; Incision and drainage of wound (N/A, 1/24/2017); pr exc skin benig >4 cm trunk,arm,leg (Left, 4/6/2021); pr neg press wound tx, < 50 cm (Left, 4/6/2021); Wound debridement (Left, 4/17/2021); Wound debridement (Left, 4/22/2021); Wound debridement (Left, 5/26/2021); Wound debridement (Left, 5/28/2021); pr debridement, skin, sub-q tissue,=<20 sq cm (Left, 7/6/2021); pr exc sweat gland lesn perineal,complx (Left, 7/6/2021); and Cardiac surgery  His family history includes Diabetes in his father; Heart disease in his father and mother; Hypertension in his mother  He  reports that he quit smoking about 17 months ago  His smoking use included cigarettes  He started smoking about 19 months ago  He has a 30 00 pack-year smoking history  He has never used smokeless tobacco  He reports previous alcohol use  He reports that he does not use drugs    Current Outpatient Medications Medication Sig Dispense Refill    doxycycline hyclate (VIBRAMYCIN) 100 mg capsule Take 1 capsule (100 mg total) by mouth every 12 (twelve) hours for 10 days 20 capsule 0    mupirocin (BACTROBAN) 2 % ointment Apply topically daily 22 g 2    albuterol (ProAir HFA) 90 mcg/act inhaler Inhale 2 puffs every 6 (six) hours as needed for wheezing 8 5 g 3    amLODIPine (NORVASC) 10 mg tablet Take 1 tablet (10 mg total) by mouth daily 90 tablet 1    aspirin (ECOTRIN LOW STRENGTH) 81 mg EC tablet Take 1 tablet (81 mg total) by mouth daily 30 tablet 0    atorvastatin (LIPITOR) 80 mg tablet Take 1 tablet (80 mg total) by mouth daily 90 tablet 1    carvedilol (COREG) 6 25 mg tablet Take 1 tablet (6 25 mg total) by mouth 2 (two) times a day with meals 180 tablet 1    Insulin Pen Needle (PEN NEEDLES 29GX1/2") 29G X 12MM MISC Use 1 needle daily for subcutaneous injections (Patient not taking: No sig reported) 50 each 3    liraglutide (VICTOZA) injection Inject 0 1 mL (0 6 mg total) under the skin daily Start at 0 6 mg daily for a week, then increase to 1 2 mg daily on the second week, then increase to 1 8 mg daily on the third week and going forward  3 mL 3    lisinopril (ZESTRIL) 20 mg tablet Take 1 tablet (20 mg total) by mouth daily 90 tablet 1    metFORMIN (GLUCOPHAGE) 1000 MG tablet Take 1 tablet (1,000 mg total) by mouth 2 (two) times a day with meals 180 tablet 1    Omega-3 Fatty Acids (fish oil) 1,000 mg Take 1 capsule (1,000 mg total) by mouth 2 (two) times a day 180 capsule 2    oxyCODONE-acetaminophen (PERCOCET) 5-325 mg per tablet       ticagrelor (Brilinta) 90 MG Take 1 tablet (90 mg total) by mouth every 12 (twelve) hours (Patient not taking: No sig reported) 180 tablet 2    zinc sulfate (ZINCATE) 220 mg capsule Take 1 capsule (220 mg total) by mouth daily 30 capsule 0     No current facility-administered medications for this visit  He is allergic to amoxicillin       Review of Systems Constitutional: Negative  HENT: Negative for ear pain and hearing loss  Eyes: Negative for pain  Respiratory: Negative for chest tightness and shortness of breath  Cardiovascular: Positive for leg swelling  Negative for chest pain and palpitations  Gastrointestinal: Negative for diarrhea, nausea and vomiting  Genitourinary: Negative for dysuria  Musculoskeletal: Negative for gait problem  Skin: Positive for wound  Neurological: Negative for tremors and weakness  Psychiatric/Behavioral: Negative for behavioral problems, confusion and suicidal ideas  Objective:       Wound 03/07/22 Surgical Leg Left;Upper;Medial (Active)   Wound Image Images linked 08/30/22 0846   Wound Description Pink;Granulation tissue; Yellow;Slough; Epithelialization 08/30/22 0846   Sarika-wound Assessment Edema;Pink;Scar Tissue 08/30/22 0846   Wound Length (cm) 3 4 cm 08/30/22 0846   Wound Width (cm) 11 7 cm 08/30/22 0846   Wound Depth (cm) 0 1 cm 08/30/22 0846   Wound Surface Area (cm^2) 39 78 cm^2 08/30/22 0846   Wound Volume (cm^3) 3 978 cm^3 08/30/22 0846   Calculated Wound Volume (cm^3) 3 98 cm^3 08/30/22 0846   Change in Wound Size % 32 31 08/30/22 0846   Undermining 0 3 08/30/22 0846   Undermining is depth extending from 6-9 oclock 08/30/22 0846   Drainage Amount Moderate 08/30/22 0846   Drainage Description Serosanguineous 08/30/22 0846   Non-staged Wound Description Full thickness 08/30/22 0846   Dressing Status Intact; New drainage 08/30/22 0846       Wound 04/11/22 Incision Abdomen Left;Medial;Lower (Active)   Wound Image Images linked 08/30/22 0853   Wound Description Granulation tissue;Slough; Yellow;Epithelialization 08/30/22 0853   Sarika-wound Assessment Pink;Scar Tissue; Induration 08/30/22 0853   Wound Length (cm) 2 6 cm (boxed measurement   2 open areas measured as one wound) 08/30/22 0853   Wound Width (cm) 2 cm 08/30/22 0853   Wound Depth (cm) 0 1 cm 08/30/22 0853   Wound Surface Area (cm^2) 5 2 cm^2 08/30/22 0853   Wound Volume (cm^3) 0 52 cm^3 08/30/22 0853   Calculated Wound Volume (cm^3) 0 52 cm^3 08/30/22 0853   Change in Wound Size % -225 08/30/22 0853   Drainage Amount Small 08/30/22 0853   Drainage Description Purulent;Serous 08/30/22 0853   Non-staged Wound Description Full thickness 08/30/22 0853   Dressing Status Other (Comment) (no dressing) 08/30/22 0853       Wound 08/23/22 Incision Thigh Left;Proximal;Medial (Active)   Wound Image Images linked 08/30/22 0850   Wound Description Granulation tissue;Pink;Slough; Yellow 08/30/22 0850   Sarika-wound Assessment Induration;Pink 08/30/22 0850   Wound Length (cm) 0 1 cm 08/30/22 0850   Wound Width (cm) 1 6 cm 08/30/22 0850   Wound Depth (cm) 0 8 cm 08/30/22 0850   Wound Surface Area (cm^2) 0 16 cm^2 08/30/22 0850   Wound Volume (cm^3) 0 128 cm^3 08/30/22 0850   Calculated Wound Volume (cm^3) 0 13 cm^3 08/30/22 0850   Change in Wound Size % 85 56 08/30/22 0850   Drainage Amount Moderate 08/30/22 0850   Drainage Description Serosanguineous 08/30/22 0850   Non-staged Wound Description Full thickness 08/30/22 0850   Dressing Status Intact; New drainage 08/30/22 0850       /87   Pulse 73   Temp (!) 96 7 °F (35 9 °C)   Resp 18                     Wound Instructions:  Orders Placed This Encounter   Procedures    Wound cleansing and dressings     Left Upper Left Leg/groin:      Wash your hands with soap and water  Remove old dressing, discard into plastic bag and place in trash  Cleanse the wound with soap and water (cleaned with Prophase today)prior to applying a clean dressing  Do not use tissue or cotton balls  Do not scrub the wound  Pat dry using gauze  Shower yes   Apply skin prep to skin surrounding wound  Apply Aquacel AG to the leg/groin wound  Cover with ABD  Secure with Medipore tape  Change dressing daily            This was done today      Wait 24 hrs until next shower (post debridement)                                                                       Standing Status:   Future     Standing Expiration Date:   8/30/2023    Wound compression and edema control     Continue using your lymphedema pump 1-2 times per day           Standing Status:   Future     Standing Expiration Date:   8/30/2023    Wound miscellaneous orders     Antibiotic extended  Take as directed  If you develop Fever, chills and don't feel well - go to ER          Standing Status:   Future     Standing Expiration Date:   8/30/2023    Wound cleansing and dressings     Left Leg incision     Wash your hands with soap and water  Remove old dressing, discard into plastic bag and place in trash  Cleanse the wound with normal saline prior to applying a clean dressing  Do not use tissue or cotton balls  Do not scrub the wound  Pat dry using gauze  Shower yes - with wound covered  Apply skin prep to skin surrounding wound  Lightly pack the wound with Iodoform packing  Cover with border gauze   Secure with Tegaderm  Change dressing daily     This was done today     Apply Warm compress to the incision 1-2 x day  Standing Status:   Future     Standing Expiration Date:   8/30/2023    Wound cleansing and dressings     Lower abdominal wound:        Apply warm compresses to the abdominal wound 1- 2x/day until compress cools down  Wash your hands with soap and water  Remove old dressing, discard into plastic bag and place in trash  Cleanse the wound with soap and water prior to applying a clean dressing  Do not use tissue or cotton balls  Do not scrub the wound  Pat dry using gauze  Shower yes   Apply mupirocin ointment to the wounds  Cover with gauze as long as it is draining  Secure with medipore tape    Change dressing daily      This was done today                                 Standing Status:   Future     Standing Expiration Date:   8/30/2023    Debridement     This order was created via procedure documentation    Debridement     This order was created via procedure documentation    Debridement     This order was created via procedure documentation    Ambulatory Referral to Dermatology     Standing Status:   Future     Standing Expiration Date:   8/30/2023     Referral Priority:   Routine     Referral Type:   Consult - AMB     Referral Reason:   Specialty Services Required     Requested Specialty:   Dermatology     Number of Visits Requested:   1     Expiration Date:   8/30/2023        Diagnosis ICD-10-CM Associated Orders   1  Non-healing surgical wound, initial encounter  T81 89XA lidocaine (XYLOCAINE) 4 % topical solution 5 mL     Wound cleansing and dressings     Wound compression and edema control     Wound miscellaneous orders     Wound cleansing and dressings     mupirocin (BACTROBAN) 2 % ointment     Wound cleansing and dressings     Debridement     Debridement   2  Acquired lymphedema of leg  I89 0 lidocaine (XYLOCAINE) 4 % topical solution 5 mL     Wound cleansing and dressings     Wound compression and edema control     Wound miscellaneous orders     Wound cleansing and dressings     Wound cleansing and dressings   3  Abscess of left groin  L02 214 doxycycline hyclate (VIBRAMYCIN) 100 mg capsule     Ambulatory Referral to Dermatology     Debridement   4  Cellulitis of left lower leg  L03 116 doxycycline hyclate (VIBRAMYCIN) 100 mg capsule   5  Morbid obesity due to excess calories ([facility])  E66 01    6  History of hidradenitis suppurativa  L73 2    7   Type 2 diabetes mellitus with diabetic polyneuropathy, without long-term current use of insulin ([facility])  E11 42

## 2022-08-30 NOTE — PATIENT INSTRUCTIONS
Orders Placed This Encounter   Procedures    Wound cleansing and dressings     Left Upper Left Leg/groin:      Wash your hands with soap and water  Remove old dressing, discard into plastic bag and place in trash  Cleanse the wound with soap and water (cleaned with Prophase today)prior to applying a clean dressing  Do not use tissue or cotton balls  Do not scrub the wound  Pat dry using gauze  Shower yes   Apply skin prep to skin surrounding wound  Apply Aquacel AG to the leg/groin wound  Cover with ABD  Secure with Medipore tape  Change dressing daily  This was done today  Wait 24 hrs until next shower (post debridement)  Standing Status:   Future     Standing Expiration Date:   8/30/2023    Wound compression and edema control     Continue using your lymphedema pump 1-2 times per day  Standing Status:   Future     Standing Expiration Date:   8/30/2023    Wound miscellaneous orders     Antibiotic extended  Take as directed  If you develop Fever, chills and don't feel well - go to ER  Standing Status:   Future     Standing Expiration Date:   8/30/2023    Wound cleansing and dressings     Left Leg incision     Wash your hands with soap and water  Remove old dressing, discard into plastic bag and place in trash  Cleanse the wound with normal saline prior to applying a clean dressing  Do not use tissue or cotton balls  Do not scrub the wound  Pat dry using gauze  Shower yes - with wound covered  Apply skin prep to skin surrounding wound  Lightly pack the wound with Iodoform packing  Cover with border gauze   Secure with Tegaderm  Change dressing daily     This was done today     Apply Warm compress to the incision 1-2 x day       Standing Status:   Future     Standing Expiration Date:   8/30/2023    Wound cleansing and dressings     Lower abdominal wound:        Apply warm compresses to the abdominal wound 1- 2x/day until compress cools down  Wash your hands with soap and water  Remove old dressing, discard into plastic bag and place in trash  Cleanse the wound with soap and water prior to applying a clean dressing  Do not use tissue or cotton balls  Do not scrub the wound  Pat dry using gauze  Shower yes   Apply mupirocin ointment to the wounds  Cover with gauze as long as it is draining  Secure with medipore tape  Change dressing daily       This was done today                                 Standing Status:   Future     Standing Expiration Date:   8/30/2023    Ambulatory Referral to Dermatology     Standing Status:   Future     Standing Expiration Date:   8/30/2023     Referral Priority:   Routine     Referral Type:   Consult - AMB     Referral Reason:   Specialty Services Required     Requested Specialty:   Dermatology     Number of Visits Requested:   1     Expiration Date:   8/30/2023

## 2022-08-30 NOTE — LETTER
August 30, 2022     Patient: Lisa Hale  YOB: 1980  Date of Visit: 8/30/2022      To Whom it May Concern:    Amanda Aaron is under my professional care  Deja Rater was seen in my office on 8/30/2022  Deja Rater may return to work on 9/31/2022  If you have any questions or concerns, please don't hesitate to call           Sincerely,          PETRA Polanco        CC: No Recipients

## 2022-08-31 ENCOUNTER — OFFICE VISIT (OUTPATIENT)
Dept: PHYSICAL THERAPY | Facility: CLINIC | Age: 42
End: 2022-08-31
Payer: COMMERCIAL

## 2022-08-31 DIAGNOSIS — S81.802S NON-HEALING WOUND OF LOWER EXTREMITY, LEFT, SEQUELA: ICD-10-CM

## 2022-08-31 DIAGNOSIS — I89.0 LYMPHEDEMA OF BOTH LOWER EXTREMITIES: ICD-10-CM

## 2022-08-31 DIAGNOSIS — I89.0 LYMPHEDEMA OF LEFT LOWER EXTREMITY: Primary | ICD-10-CM

## 2022-08-31 PROCEDURE — 97140 MANUAL THERAPY 1/> REGIONS: CPT | Performed by: PHYSICAL THERAPIST

## 2022-08-31 NOTE — PROGRESS NOTES
Daily Note  IE 22     Today's date: 2022  Patient name: Jewel Connor  : 1980  MRN: [id number]  Referring provider: Rosetta Rivero MD  Dx:   Encounter Diagnosis     ICD-10-CM    1  Lymphedema of left lower extremity  I89 0    2  Non-healing wound of lower extremity, left, sequela  S81 802S    3  Lymphedema of both lower extremities  I89 0                   Subjective: Josue Valadez reports "okay" upon arrival to therapy today  Objective: See treatment diary below      Assessment: Tolerated treatment fair  Patient would benefit from continued PT  PT to send information on compression velcro Donelda Ann wraps to patient through email as patient's insurance does not have any compression benefits  Patient would be size XL standard length  Plan: Continue per plan of care  Progress treatment as tolerated             Precautions: Chronic swelling, chronic infections, BMI > 30,   HEP: review educational literature provided by PT, order short stretch bandages as highlighted by PT   Daily Treatment Diary     Manuals          L LE MLD sequence  40' total  40' total  40' total                                    Therapeutic Exercise             Bike              Hip abduction             Hip flexion             Heel raises             Toe raises             Lateral walking             Bridges                                                                                                        Self-care/ Home management             Lymphedema education  8'

## 2022-09-07 ENCOUNTER — HOSPITAL ENCOUNTER (EMERGENCY)
Facility: HOSPITAL | Age: 42
Discharge: HOME | End: 2022-09-07
Attending: EMERGENCY MEDICINE
Payer: COMMERCIAL

## 2022-09-07 VITALS
TEMPERATURE: 98.7 F | DIASTOLIC BLOOD PRESSURE: 95 MMHG | RESPIRATION RATE: 22 BRPM | OXYGEN SATURATION: 96 % | WEIGHT: 315 LBS | BODY MASS INDEX: 54.41 KG/M2 | HEART RATE: 81 BPM | SYSTOLIC BLOOD PRESSURE: 150 MMHG

## 2022-09-07 DIAGNOSIS — T46.4X5A ACE INHIBITOR-AGGRAVATED ANGIOEDEMA, INITIAL ENCOUNTER: Primary | ICD-10-CM

## 2022-09-07 DIAGNOSIS — T78.3XXA ACE INHIBITOR-AGGRAVATED ANGIOEDEMA, INITIAL ENCOUNTER: Primary | ICD-10-CM

## 2022-09-07 PROCEDURE — 99283 EMERGENCY DEPT VISIT LOW MDM: CPT

## 2022-09-07 PROCEDURE — 99284 EMERGENCY DEPT VISIT MOD MDM: CPT | Performed by: EMERGENCY MEDICINE

## 2022-09-07 RX ORDER — PREDNISONE 20 MG/1
60 TABLET ORAL DAILY
Qty: 15 TABLET | Refills: 0 | Status: SHIPPED | OUTPATIENT
Start: 2022-09-07 | End: 2022-09-12

## 2022-09-07 NOTE — Clinical Note
Irineo Torres was seen and treated in our emergency department on 9/7/2022  No restrictions            Diagnosis:     Juju Mahoney  may return to work on return date  He may return on this date: 09/08/2022         If you have any questions or concerns, please don't hesitate to call        Barbara Zuniga RN    ______________________________           _______________          _______________  Hospital Representative                              Date                                Time

## 2022-09-07 NOTE — ED PROVIDER NOTES
History  Chief Complaint   Patient presents with    Facial Swelling     Started at 4am started with facial swelling, took 4 benadryl and woke up short time ago  Some of swelling down abit, denies any trouble breathing  States tongue feels weird  Does not appear swollen in triage started doxycycline a week ago  Only thing he could think that would cause it     Patient is a 52-year-old male  He has a penicillin allergy  He has been on doxycycline for redness to the left leg which has a history of chronic lymphedema  The leg has improved  No fever  He is also on an ACE-inhibitor  Patient awoke today with some swelling to the left side of his face  He has no dental complaints  He felt little short of breath  He denies any urticaria  He took some Benadryl and is improved  Prior to Admission Medications   Prescriptions Last Dose Informant Patient Reported? Taking?    Insulin Pen Needle (PEN NEEDLES 29GX1/2") 29G X 12MM MISC   No No   Sig: Use 1 needle daily for subcutaneous injections   Patient not taking: No sig reported   Omega-3 Fatty Acids (fish oil) 1,000 mg   No No   Sig: Take 1 capsule (1,000 mg total) by mouth 2 (two) times a day   albuterol (ProAir HFA) 90 mcg/act inhaler   No No   Sig: Inhale 2 puffs every 6 (six) hours as needed for wheezing   amLODIPine (NORVASC) 10 mg tablet   No No   Sig: Take 1 tablet (10 mg total) by mouth daily   aspirin (ECOTRIN LOW STRENGTH) 81 mg EC tablet   No No   Sig: Take 1 tablet (81 mg total) by mouth daily   atorvastatin (LIPITOR) 80 mg tablet   No No   Sig: Take 1 tablet (80 mg total) by mouth daily   carvedilol (COREG) 6 25 mg tablet   No No   Sig: Take 1 tablet (6 25 mg total) by mouth 2 (two) times a day with meals   doxycycline hyclate (VIBRAMYCIN) 100 mg capsule   No No   Sig: Take 1 capsule (100 mg total) by mouth every 12 (twelve) hours for 10 days   liraglutide (VICTOZA) injection   No No   Sig: Inject 0 1 mL (0 6 mg total) under the skin daily Start at 0 6 mg daily for a week, then increase to 1 2 mg daily on the second week, then increase to 1 8 mg daily on the third week and going forward  lisinopril (ZESTRIL) 20 mg tablet   No No   Sig: Take 1 tablet (20 mg total) by mouth daily   metFORMIN (GLUCOPHAGE) 1000 MG tablet   No No   Sig: Take 1 tablet (1,000 mg total) by mouth 2 (two) times a day with meals   mupirocin (BACTROBAN) 2 % ointment   No No   Sig: Apply topically daily   oxyCODONE-acetaminophen (PERCOCET) 5-325 mg per tablet   Yes No   ticagrelor (Brilinta) 90 MG   No No   Sig: Take 1 tablet (90 mg total) by mouth every 12 (twelve) hours   Patient not taking: No sig reported   zinc sulfate (ZINCATE) 220 mg capsule   No No   Sig: Take 1 capsule (220 mg total) by mouth daily      Facility-Administered Medications: None       Past Medical History:   Diagnosis Date    Arthritis     Breathing difficulty     Coronary artery disease     [facility]     Diabetic neuropathy ([facility] ) 5/28/2021    Heart disease     CAD   s/p ptca with 1 stent 2012    Hidradenitis suppurativa     groin    History of transfusion     Hyperlipidemia     Hypertension     [facility]     " silent " M I  in the past    Morbid obesity with BMI of 50 0-59 9, adult ([facility] )     Nicotine dependence     Obesity     Pilonidal cyst        Past Surgical History:   Procedure Laterality Date    CARDIAC SURGERY      CORONARY ANGIOPLASTY WITH STENT PLACEMENT      INCISION AND DRAINAGE OF WOUND N/A 1/24/2017    Procedure: INCISION AND DRAINAGE (I&D) BUTTOCK, PILONIDAL CYST;  Surgeon: Jemal Patel MD;  Location: [address] [place] [address];  Service:    [facility] LEG SURGERY Left     I&D of left leg 2012    NV DEBRIDEMENT, SKIN, SUB-Q TISSUE,=<20 SQ CM Left 7/6/2021    Procedure: DEBRIDEMENT WOUND [facility] OUT);   Surgeon: Peyton Young MD;  Location:  MAIN OR;  Service: General    NV EXC SKIN BENIG >4 CM TRUNK,ARM,LEG Left 4/6/2021    Procedure: EXCISION THIGH MASS;  Surgeon: Merary Wood Alex Miller MD;  [facility];  Service: General    NJ EXC SWEAT GLAND Javon Alessandro Left 2021    Procedure: EXCISION PERINEAL ABSCESS LEFT THIGH;  Surgeon: Oliver Hutton MD;  [facility];  Service: General    NJ NEG PRESS WOUND TX, < 50 CM Left 2021    Procedure: APPLICATION VAC DRESSING THIGH;  Surgeon: Oliver Hutton MD;  [facility];  Service: General    WOUND DEBRIDEMENT Left 2021    Procedure: DEBRIDEMENT WOUND AND DRESSING CHANGE ([address] OUT); Surgeon: Cary Harvey DO;  [facility];  Service: General    WOUND DEBRIDEMENT Left 2021    Procedure: DEBRIDEMENT LOWER EXTREMITY [facility] OUT), left groin and thigh;  Surgeon: Oliver Hutton MD;  [facility];  Service: General    WOUND DEBRIDEMENT Left 2021    Procedure: DEBRIDEMENT LOWER EXTREMITY ([address] OUT); Surgeon: Berna Cohen DO;  [facility];  Service: General    WOUND DEBRIDEMENT Left 2021    Procedure: Washout left thigh wound and closure;  Surgeon: Deangelo Chacon DO;  [facility];  Service: General       Family History   Problem Relation Age of Onset    Heart disease Father     Diabetes Father     Hypertension Mother     Heart disease Mother      I have reviewed and agree with the history as documented  E-Cigarette/Vaping    E-Cigarette Use Never User      E-Cigarette/Vaping Substances    Nicotine No     THC No     CBD No     Flavoring No      Social History     Tobacco Use    Smoking status: Former Smoker     Packs/day: 1 50     Years: 20 00     Pack years: 30 00     Types: Cigarettes     Start date:      Quit date: 2021     Years since quittin 5    Smokeless tobacco: Never Used   Vaping Use    Vaping Use: Never used   Substance Use Topics    Alcohol use: Not Currently     Comment: rarely    Drug use: No       Review of Systems   Constitutional: Negative for chills and fever  HENT: Positive for facial swelling   Negative for rhinorrhea and sore throat  Eyes: Negative for pain, redness and visual disturbance  Respiratory: Negative for cough and shortness of breath  Cardiovascular: Positive for leg swelling  Negative for chest pain  Gastrointestinal: Negative for abdominal pain, diarrhea and vomiting  Endocrine: Negative for polydipsia and polyuria  Genitourinary: Negative for dysuria, frequency and hematuria  Musculoskeletal: Negative for back pain and neck pain  Skin: Negative for rash and wound  Allergic/Immunologic: Negative for immunocompromised state  Neurological: Negative for weakness, numbness and headaches  Psychiatric/Behavioral: Negative for hallucinations and suicidal ideas  All other systems reviewed and are negative  Physical Exam  Physical Exam  Vitals reviewed  Constitutional:       General: He is not in acute distress  Appearance: He is obese  He is not toxic-appearing  HENT:      Head: Normocephalic and atraumatic  Comments: There is slight swelling to the left cheek  No intraoral swelling or tenderness  No fluctuance  No tenderness or induration under the tongue or to the submandibular area  Nose: Nose normal       Mouth/Throat:      Mouth: Mucous membranes are moist    Eyes:      General:         Right eye: No discharge  Left eye: No discharge  Conjunctiva/sclera: Conjunctivae normal    Cardiovascular:      Rate and Rhythm: Normal rate and regular rhythm  Pulses: Normal pulses  Heart sounds: Normal heart sounds  No murmur heard  No friction rub  No gallop  Pulmonary:      Effort: Pulmonary effort is normal  No respiratory distress  Breath sounds: Normal breath sounds  No stridor  No wheezing, rhonchi or rales  Abdominal:      General: Bowel sounds are normal  There is no distension  Palpations: Abdomen is soft  Tenderness: There is no abdominal tenderness   There is no right CVA tenderness, left CVA tenderness, guarding or rebound  Musculoskeletal:         General: No swelling, tenderness, deformity or signs of injury  Normal range of motion  Cervical back: Normal range of motion and neck supple  No rigidity  Right lower leg: No edema  Left lower leg: Edema present  Comments: There is swelling to the left lower extremity  There is no erythema or warmth or tenderness  Skin:     General: Skin is warm and dry  Coloration: Skin is not jaundiced or pale  Findings: No bruising, erythema or rash  Neurological:      General: No focal deficit present  Mental Status: He is alert and oriented to person, place, and time  Cranial Nerves: No facial asymmetry  Sensory: No sensory deficit  Motor: Motor function is intact     Psychiatric:         Mood and Affect: Mood normal          Behavior: Behavior normal          Vital Signs  ED Triage Vitals [09/07/22 1609]   Temperature Pulse Respirations Blood Pressure SpO2   98 8 °F (37 1 °C) 84 (!) 24 168/87 95 %      Temp Source Heart Rate Source Patient Position - Orthostatic VS BP Location FiO2 (%)   Tympanic Monitor Sitting Right arm --      Pain Score       No Pain           Vitals:    09/07/22 1609   BP: 168/87   Pulse: 84   Patient Position - Orthostatic VS: Sitting         Visual Acuity      ED Medications  Medications - No data to display    Diagnostic Studies  Results Reviewed     None                 No orders to display              Procedures  ECG 12 Lead Documentation Only    Date/Time: 9/7/2022 4:26 PM  Performed by: Radha Pal MD  Authorized by: Radha Pal MD     ECG reviewed by me, the ED Provider: yes    Patient location:  ED  Interpretation:     Interpretation: normal    Rate:     ECG rate assessment: normal    Rhythm:     Rhythm: sinus rhythm    Ectopy:     Ectopy: none    QRS:     QRS axis:  Normal  Conduction:     Conduction: normal    ST segments:     ST segments:  Normal  T waves:     T waves: normal               ED Course                                             MDM  Number of Diagnoses or Management Options  Diagnosis management comments: Patient is on an ACE-inhibitor  I suspect this could be ACE-inhibitor angioedema  The swelling is localized to left cheek  It does not appear to be a generalized reaction  As there is a possibility that it could be the doxycycline, and the leg looks better, I will have patient hold the doxycycline  LM continue Benadryl  I will prescribe a short course prednisone  Patient is aware that this may raise his sugar  There is no anaphylaxis at this time  Disposition  Final diagnoses:   ACE inhibitor-aggravated angioedema, initial encounter     Time reflects when diagnosis was documented in both MDM as applicable and the Disposition within this note     Time User Action Codes Description Comment    9/7/2022  4:27 PM Cari Almeida 92  3XXA,  T46 [id number]] ACE inhibitor-aggravated angioedema, initial encounter       ED Disposition     ED Disposition   Discharge    Condition   Stable    Date/Time   Wed Sep 7, 2022  4:27 PM    [address] [place] [address] discharge to home/self care  Follow-up Information     Follow up With Specialties Details Why Contact Info    Follow-up with your family doctor in 2 days for re-evaluation              Patient's Medications   Discharge Prescriptions    PREDNISONE 20 MG TABLET    Take 3 tablets (60 mg total) by mouth daily for 5 days       Start Date: 9/7/2022  End Date: 9/12/2022       Order Dose: 60 mg       Quantity: 15 tablet    Refills: 0       No discharge procedures on file      PDMP Review       Value Time User    PDMP Reviewed  Yes 4/29/2021 10:52 AM Marcie Cat PA-C          ED Provider  Electronically Signed by           Mayuri Cardenas MD  09/07/22 9478

## 2022-09-20 ENCOUNTER — TELEPHONE (OUTPATIENT)
Dept: DERMATOLOGY | Facility: CLINIC | Age: 42
End: 2022-09-20

## 2022-10-04 ENCOUNTER — OFFICE VISIT (OUTPATIENT)
Dept: WOUND CARE | Facility: HOSPITAL | Age: 42
End: 2022-10-04
Payer: COMMERCIAL

## 2022-10-04 VITALS
DIASTOLIC BLOOD PRESSURE: 91 MMHG | HEART RATE: 81 BPM | RESPIRATION RATE: 17 BRPM | SYSTOLIC BLOOD PRESSURE: 161 MMHG | TEMPERATURE: 97.6 F

## 2022-10-04 DIAGNOSIS — L02.214 ABSCESS OF LEFT GROIN: ICD-10-CM

## 2022-10-04 DIAGNOSIS — L02.211 ABSCESS OF SKIN OF ABDOMEN: ICD-10-CM

## 2022-10-04 DIAGNOSIS — I89.0 ACQUIRED LYMPHEDEMA OF LEG: ICD-10-CM

## 2022-10-04 DIAGNOSIS — E11.42 TYPE 2 DIABETES MELLITUS WITH DIABETIC POLYNEUROPATHY, WITHOUT LONG-TERM CURRENT USE OF INSULIN (HCC): ICD-10-CM

## 2022-10-04 DIAGNOSIS — T81.89XA NON-HEALING SURGICAL WOUND, INITIAL ENCOUNTER: Primary | ICD-10-CM

## 2022-10-04 DIAGNOSIS — E66.01 MORBID OBESITY DUE TO EXCESS CALORIES (HCC): ICD-10-CM

## 2022-10-04 PROCEDURE — 97597 DBRDMT OPN WND 1ST 20 CM/<: CPT | Performed by: NURSE PRACTITIONER

## 2022-10-04 PROCEDURE — 97598 DBRDMT OPN WND ADDL 20CM/<: CPT | Performed by: NURSE PRACTITIONER

## 2022-10-04 PROCEDURE — 99213 OFFICE O/P EST LOW 20 MIN: CPT | Performed by: NURSE PRACTITIONER

## 2022-10-04 RX ORDER — LIDOCAINE HYDROCHLORIDE 40 MG/ML
5 SOLUTION TOPICAL ONCE
Status: COMPLETED | OUTPATIENT
Start: 2022-10-04 | End: 2022-10-04

## 2022-10-04 RX ADMIN — LIDOCAINE HYDROCHLORIDE 5 ML: 40 SOLUTION TOPICAL at 08:51

## 2022-10-04 NOTE — PATIENT INSTRUCTIONS
Orders Placed This Encounter   Procedures    Wound cleansing and dressings       Left Upper Left Leg/groin:      Wash your hands with soap and water  Remove old dressing, discard into plastic bag and place in trash  Cleanse the wound with soap and water (cleaned with Prophase today)prior to applying a clean dressing  Do not use tissue or cotton balls  Do not scrub the wound  Pat dry using gauze  Shower yes   Apply skin prep to skin surrounding wound  Apply Aquacel AG to the leg/groin wound  Cover with ABD  Secure with Medipore tape  Change dressing daily  This was done today  Wait 24 hrs until next shower (post debridement)  Standing Status:   Future     Standing Expiration Date:   10/4/2023    Wound cleansing and dressings     Left Leg incision     Wash your hands with soap and water  Remove old dressing, discard into plastic bag and place in trash  Cleanse the wound with normal saline prior to applying a clean dressing  Do not use tissue or cotton balls  Do not scrub the wound  Pat dry using gauze  Shower yes - with wound covered  Apply skin prep to skin surrounding wound  Apply [place] to wound  Cover with border gauze     Change dressing daily     This was done today                  Standing Status:   Future     Standing Expiration Date:   10/4/2023    Wound compression and edema control     Wound compression and edema control  Continue using your lymphedema pump 1-2 times per day  Standing Status:   Future     Standing Expiration Date:   10/4/2023    Wound cleansing and dressings     Lower abdominal wound:        Apply warm compresses to the abdominal wound 1- 2x/day until compress cools down  Wash your hands with soap and water  Remove old dressing, discard into plastic bag and place in trash    Cleanse the wound with soap and water prior to applying a clean dressing  Do not use tissue or cotton balls  Do not scrub the wound  Pat dry using gauze  Shower yes   Apply mupirocin ointment to the wounds  Cover with gauze as long as it is draining  Secure with medipore tape  Change dressing daily  This was done today     Standing Status:   Future     Standing Expiration Date:   10/4/2023    Wound miscellaneous orders     Have ultrasound of abdomen done as ordered       Standing Status:   Future     Standing Expiration Date:   10/4/2023

## 2022-10-04 NOTE — PROGRESS NOTES
Patient ID: Loralyn Gowers is a 40 y o  male Date of Birth 1980     Chief Complaint  Chief Complaint   Patient presents with    Follow Up Wound Care Visit     Abdomen and left thigh       Allergies  Amoxicillin    Assessment:     Diagnoses and all orders for this visit:    Non-healing surgical wound, initial encounter  -     lidocaine (XYLOCAINE) 4 % topical solution 5 mL  -     Wound cleansing and dressings; Future  -     Wound cleansing and dressings; Future  -     Wound compression and edema control; Future  -     Wound cleansing and dressings; Future  -     Wound miscellaneous orders; Future  -     Debridement    Abscess of left groin  -     lidocaine (XYLOCAINE) 4 % topical solution 5 mL  -     Wound cleansing and dressings; Future  -     Wound cleansing and dressings; Future  -     Wound compression and edema control; Future  -     Wound cleansing and dressings; Future  -     Wound miscellaneous orders; Future  -     Debridement    Acquired lymphedema of leg  -     Wound cleansing and dressings; Future  -     Wound cleansing and dressings; Future  -     Wound compression and edema control; Future  -     Wound cleansing and dressings; Future  -     Wound miscellaneous orders; Future    Morbid obesity due to excess calories (HCC)  -     Wound cleansing and dressings; Future  -     Wound cleansing and dressings; Future  -     Wound compression and edema control; Future  -     Wound cleansing and dressings; Future  -     Wound miscellaneous orders; Future    Type 2 diabetes mellitus with diabetic polyneuropathy, without long-term current use of insulin (HCC)  -     Wound cleansing and dressings; Future  -     Wound cleansing and dressings; Future  -     Wound compression and edema control; Future  -     Wound cleansing and dressings; Future  -     Wound miscellaneous orders; Future    Abscess of skin of abdomen  -     US abdomen complete;  Future              Debridement   Wound 03/07/22 Surgical Leg Left;Upper;Medial    Universal Protocol:  Consent: Written consent obtained  Consent given by: patient  Time out: Immediately prior to procedure a "time out" was called to verify the correct patient, procedure, equipment, support staff and site/side marked as required  Timeout called at: 10/4/2022 9:47 AM   Patient identity confirmed: verbally with patient      Performed by: NP  Debridement type: selective  Pain control: lidocaine 4%  Pre-debridement measurements  Length (cm): 3 2  Width (cm): 12  Depth (cm): 0 1  Surface Area (cm^2): 38 4  Volume (cm^3): 3 84    Post-debridement measurements  Length (cm): 3 2  Width (cm): 12  Depth (cm): 0 1  Percent debrided: 100%  Surface Area (cm^2): 38 4  Area debrided (cm^2): 38 4  Volume (cm^3): 3 84  Devitalized tissue debrided: biofilm, fibrin and slough  Instrument(s) utilized: curette  Bleeding: small  Hemostasis obtained with: pressure  Procedural pain (0-10): 0  Post-procedural pain: 0   Response to treatment: procedure was tolerated well    Debridement   Wound 08/23/22 Incision Thigh Left;Proximal;Medial    Universal Protocol:  Consent: Written consent obtained  Consent given by: patient  Time out: Immediately prior to procedure a "time out" was called to verify the correct patient, procedure, equipment, support staff and site/side marked as required    Timeout called at: 10/4/2022 9:48 AM   Patient identity confirmed: verbally with patient      Performed by: NP  Debridement type: selective  Pain control: lidocaine 4%  Pre-debridement measurements  Length (cm): 0 1  Width (cm): 0 5  Depth (cm): 0 5  Surface Area (cm^2): 0 05  Volume (cm^3): 0 03    Post-debridement measurements  Length (cm): 0 1  Width (cm): 0 5  Depth (cm): 0 5  Percent debrided: 100%  Surface Area (cm^2): 0 05  Area debrided (cm^2): 0 05  Volume (cm^3): 0 03  Devitalized tissue debrided: biofilm, fibrin and slough  Instrument(s) utilized: curette  Bleeding: small  Hemostasis obtained with: pressure  Procedural pain (0-10): 0  Post-procedural pain: 0   Response to treatment: procedure was tolerated well          Plan:  1  F/u visit  Wounds debrided  Leg wound measuring the same  Discussed with patient about possibly seeing surgeon for STSG for closure d/t wound not making much progress since he was last seen in wound center  Patient declines at this time d/t not being able to be out of work after surgery for recovery  Continue current plan of care  2  Groin wound improving and measuring smaller  Will change treatment to Woun'dress covered with dry gauze and tape changed daily  3  Abdominal wound measuring smaller  Skin around wound is indurated but not erythematous or painful to palpation  Concerned for possible cyst or non-infected abscess  Will order an ultrasound to r/o fluid collection  If ultrasound comes back positive for fluid collection  Will refer patient to see Dr Gracia Fraser  4  Patient is requesting to come back in 4 weeks instead of the recommended 2-3 d/t his work schedule  Counseled patient to call wound center or go to the nearest urgent care or ER if he experiences any increased pain, fevers, chills, or increased erythema  Patient verbalized agreement and understanding      Wound 03/07/22 Surgical Leg Left;Upper;Medial (Active)   Wound Image Images linked 10/04/22 0834   Wound Description Pink;Granulation tissue; Yellow;Slough; Epithelialization 10/04/22 0834   Sarika-wound Assessment Edema;Pink;Scar Tissue 10/04/22 0834   Wound Length (cm) 3 2 cm 10/04/22 0834   Wound Width (cm) 12 cm 10/04/22 0834   Wound Depth (cm) 0 1 cm 10/04/22 0834   Wound Surface Area (cm^2) 38 4 cm^2 10/04/22 0834   Wound Volume (cm^3) 3 84 cm^3 10/04/22 0834   Calculated Wound Volume (cm^3) 3 84 cm^3 10/04/22 0834   Change in Wound Size % 34 69 10/04/22 0834   Undermining 0 2 10/04/22 0834   Undermining is depth extending from 6-9 o'clock 10/04/22 0834   Drainage Amount Moderate 10/04/22 0834   Drainage Description Yellow 10/04/22 0834   Non-staged Wound Description Full thickness 10/04/22 0834   Dressing Status Intact; Old drainage (upon arrival) 10/04/22 0834       Wound 04/11/22 Incision Abdomen Left;Medial;Lower (Active)   Wound Image Images linked 10/04/22 0840   Wound Description Granulation tissue;Slough; Yellow;Epithelialization 10/04/22 0840   Sarika-wound Assessment Pink;Scar Tissue; Induration 10/04/22 0840   Wound Length (cm) 1 cm (two spots open) 10/04/22 0840   Wound Width (cm) 2 5 cm 10/04/22 0840   Wound Depth (cm) 0 1 cm 10/04/22 0840   Wound Surface Area (cm^2) 2 5 cm^2 10/04/22 0840   Wound Volume (cm^3) 0 25 cm^3 10/04/22 0840   Calculated Wound Volume (cm^3) 0 25 cm^3 10/04/22 0840   Change in Wound Size % -56 25 10/04/22 0840   Drainage Amount Small 10/04/22 0840   Drainage Description Yellow;Purulent 10/04/22 0840   Dressing Status Other (Comment) (no dressing upon arrival) 10/04/22 0840       Wound 08/23/22 Incision Thigh Left;Proximal;Medial (Active)   Wound Image Images linked 10/04/22 0844   Wound Description Granulation tissue;Pink;Slough; Yellow 10/04/22 0844   Sarika-wound Assessment Pink 10/04/22 0844   Wound Length (cm) 0 1 cm 10/04/22 0844   Wound Width (cm) 0 5 cm 10/04/22 0844   Wound Depth (cm) 0 5 cm 10/04/22 0844   Wound Surface Area (cm^2) 0 05 cm^2 10/04/22 0844   Wound Volume (cm^3) 0 025 cm^3 10/04/22 0844   Calculated Wound Volume (cm^3) 0 03 cm^3 10/04/22 0844   Change in Wound Size % 96 67 10/04/22 0844   Drainage Amount Small 10/04/22 0844   Drainage Description Yellow 10/04/22 0844   Non-staged Wound Description Full thickness 10/04/22 0844   Dressing Status Other (Comment) (no dressing upon arrival) 10/04/22 0844       Wound 03/07/22 Surgical Leg Left;Upper;Medial (Active)   Date First Assessed/Time First Assessed: 03/07/22 0833   Primary Wound Type: Surgical  Location: Leg  Wound Location Orientation: Left;Upper;Medial  Wound Outcome: (c)        Wound 04/11/22 Incision Abdomen Left;Medial;Lower (Active)   Date First Assessed: 04/11/22   Primary Wound Type: Incision  Location: Abdomen  Wound Location Orientation: Left;Medial;Lower       Wound 08/23/22 Incision Thigh Left;Proximal;Medial (Active)   Date First Assessed: 08/23/22   Primary Wound Type: Incision  Location: Thigh  Wound Location Orientation: Left;Proximal;Medial       [REMOVED] Wound 05/26/21 Incision Thigh Left (Removed)   Resolved Date: 03/07/22  Date First Assessed/Time First Assessed: 05/26/21 1914   Pre-Existing Wound: Yes  Primary Wound Type: Incision  Location: Thigh  Wound Location Orientation: Left  Wound Description (Comments): 2 incisions  Incision's 1st Dress    [REMOVED] Wound 05/26/21 Surgical Other (Comment) Groin Left (Removed)   Resolved Date: 03/07/22  Date First Assessed/Time First Assessed: 05/26/21 1916   Pre-Existing Wound: Yes  Primary Wound Type: Surgical  Traumatic Wound Type: (c) Other (Comment)  Location: Groin  Wound Location Orientation: Left  Wound Outcome: Unkno    [REMOVED] Wound 06/01/21 Other (comment) Cellulitis Pretibial Left (Removed)   Resolved Date: 03/07/22  Date First Assessed/Time First Assessed: 06/01/21 1930   Pre-Existing Wound: Yes  Primary Wound Type: Other (comment)  Traumatic Wound Type: Cellulitis  Location: Pretibial  Wound Location Orientation: Left  Dressing Status: C         [REMOVED] Wound 06/02/21 Hip Anterior;Right (Removed)   Resolved Date: 03/07/22  Date First Assessed/Time First Assessed: 06/02/21 1028   Location: Hip  Wound Location Orientation: Anterior;Right  Wound Outcome: Unknown (No longer present)       [REMOVED] Wound 06/02/21 Sacrum (Removed)   Resolved Date: 03/07/22  Date First Assessed/Time First Assessed: 06/02/21 1034   Location: Sacrum  Wound Outcome: Unknown (No longer present)       [REMOVED] Wound 07/06/21 Leg Left (Removed)   Resolved Date: 03/07/22  Date First Assessed/Time First Assessed: 07/06/21 1639   Location: Leg  Wound Location Orientation: Left  Incision's 1st Dressing: KERLIX SUPER SPONGE 6 IN (x2), ACE WRAP 6 IN UNSTERILE (x4), SPONGE GAUZE 4 X 4 16 PLY STRL FÁTIMA    [REMOVED] Wound 03/30/22 Sacrum (Removed)   Resolved Date: 04/11/22  Date First Assessed/Time First Assessed: 03/30/22 1610   Location: Sacrum  Wound Description (Comments): 4/1/22 Stage 2 POA  Wound Outcome: (c) Other (Comment)       [REMOVED] Wound 03/30/22 Surgical Thigh Anterior; Left (Removed)   Resolved Date: 04/11/22  Date First Assessed/Time First Assessed: 03/30/22 1614   Pre-Existing Wound: Yes  Primary Wound Type: Surgical  Location: Thigh  Wound Location Orientation: Anterior; Left  Wound Description (Comments): Surgical Wound  Dressing    [REMOVED] Wound 03/30/22 Abdomen Left (Removed)   Resolved Date: 04/11/22  Date First Assessed/Time First Assessed: 03/30/22 1616   Location: Abdomen  Wound Location Orientation: Left  Wound Outcome: (c) Other (Comment)       [REMOVED] Wound 03/30/22 Thigh Anterior;Right (Removed)   Resolved Date: 04/11/22  Date First Assessed/Time First Assessed: 03/30/22 1617   Location: Thigh  Wound Location Orientation: Anterior;Right  Wound Outcome: (c) Other (Comment)       [REMOVED] Wound 03/30/22 Abscess Cellulitis Thigh Anterior;Left;Proximal (Removed)   Resolved Date: 04/11/22  Date First Assessed/Time First Assessed: 03/30/22 1618   Primary Wound Type: Abscess  Traumatic Wound Type: Cellulitis  Location: Thigh  Wound Location Orientation: Anterior;Left;Proximal  Dressing Status: New drainage  Wound     [REMOVED] Wound 05/04/22 Leg Left; Inner; Lower (Removed)   Resolved Date: 06/21/22  Date First Assessed/Time First Assessed: 05/04/22 2200   Pre-Existing Wound: Yes  Location: Leg  Wound Location Orientation: Left; Inner; Lower  Wound Outcome: (c) Other (Comment)       [REMOVED] Wound 05/04/22 Other (comment) Sacrum (Removed)   Resolved Date: 06/21/22  Date First Assessed/Time First Assessed: 05/04/22 0001   Pre-Existing Wound: Yes  Primary Wound Type: Other (comment)  Location: Sacrum  Wound Description (Comments): Pt states wound is closed/healed & declined assessment 6/21    Subjective:     F/u visit multiple wounds  Patient reports he accidentally missed his dermatology appointment d/t his work schedule  He has not re-scheduled his appointment yet  Patient also reports that he noticed increased induration of his lower abdominal pannus  He denies any increased pain or erythema of area  He denies any fevers or chills          The following portions of the patient's history were reviewed and updated as appropriate:   He  has a past medical history of Arthritis, Breathing difficulty, Coronary artery disease, Diabetes mellitus ([facility] ), Diabetic neuropathy ([facility] ) (5/28/2021), Heart disease, Hidradenitis suppurativa, History of transfusion, Hyperlipidemia, Hypertension, MI (myocardial infarction) ([facility] ), Morbid obesity with BMI of 50 0-59 9, adult ([facility] ), Nicotine dependence, Obesity, and Pilonidal cyst   He   Patient Active Problem List    Diagnosis Date Noted    Lymphedema of both lower extremities 06/15/2022    History of coronary angioplasty with insertion of stent 06/13/2022    Colitis 05/04/2022    Abscess of multiple sites of buttock 05/04/2022    Anemia 04/01/2022    Swelling of left lower extremity 03/30/2022    History of hidradenitis suppurativa 03/30/2022    Nocturnal hypoxia 12/04/2021    Cellulitis of multiple sites of trunk 12/02/2021    Chest pain 12/02/2021    Cellulitis of left lower extremity 06/23/2021    H/O drainage of abscess 06/21/2021    Difficult intubation 05/28/2021    Morbid obesity with body mass index (BMI) of 50 0 to 59 9 in adult [facility]) 03/10/2021    Type 1 non-ST elevation myocardial infarction (NSTEMI) ([facility] ) 11/29/2020    Left groin mass 11/29/2020    Type 2 diabetes mellitus with diabetic polyneuropathy, without long-term current use of insulin ([facility] ) 07/26/2018    Dyslipidemia 01/23/2017    Pilonidal cyst 12/13/2016    Essential hypertension 01/14/2016    Abnormal liver enzymes 09/23/2014    Coronary artery disease 06/25/2014     He  has a past surgical history that includes Leg Surgery (Left); Coronary angioplasty with stent; Incision and drainage of wound (N/A, 1/24/2017); pr exc skin benig >4 cm trunk,arm,leg (Left, 4/6/2021); pr neg press wound tx, < 50 cm (Left, 4/6/2021); Wound debridement (Left, 4/17/2021); Wound debridement (Left, 4/22/2021); Wound debridement (Left, 5/26/2021); Wound debridement (Left, 5/28/2021); pr debridement, skin, sub-q tissue,=<20 sq cm (Left, 7/6/2021); pr exc sweat gland lesn perineal,complx (Left, 7/6/2021); and Cardiac surgery  His family history includes Diabetes in his father; Heart disease in his father and mother; Hypertension in his mother  He  reports that he quit smoking about 19 months ago  His smoking use included cigarettes  He started smoking about 21 months ago  He has a 30 00 pack-year smoking history  He has never used smokeless tobacco  He reports previous alcohol use  He reports that he does not use drugs    Current Outpatient Medications   Medication Sig Dispense Refill    albuterol (ProAir HFA) 90 mcg/act inhaler Inhale 2 puffs every 6 (six) hours as needed for wheezing 8 5 g 3    amLODIPine (NORVASC) 10 mg tablet Take 1 tablet (10 mg total) by mouth daily 90 tablet 1    aspirin (ECOTRIN LOW STRENGTH) 81 mg EC tablet Take 1 tablet (81 mg total) by mouth daily 30 tablet 0    atorvastatin (LIPITOR) 80 mg tablet Take 1 tablet (80 mg total) by mouth daily 90 tablet 1    carvedilol (COREG) 6 25 mg tablet Take 1 tablet (6 25 mg total) by mouth 2 (two) times a day with meals 180 tablet 1    Insulin Pen Needle (PEN NEEDLES 29GX1/2") 29G X 12MM MISC Use 1 needle daily for subcutaneous injections (Patient not taking: No sig reported) 50 each 3    liraglutide (VICTOZA) injection Inject 0 1 mL (0 6 mg total) under the skin daily Start at 0 6 mg daily for a week, then increase to 1 2 mg daily on the second week, then increase to 1 8 mg daily on the third week and going forward  3 mL 3    metFORMIN (GLUCOPHAGE) 1000 MG tablet Take 1 tablet (1,000 mg total) by mouth 2 (two) times a day with meals 180 tablet 1    mupirocin (BACTROBAN) 2 % ointment Apply topically daily 22 g 2    Omega-3 Fatty Acids (fish oil) 1,000 mg Take 1 capsule (1,000 mg total) by mouth 2 (two) times a day 180 capsule 2    oxyCODONE-acetaminophen (PERCOCET) 5-325 mg per tablet       ticagrelor (Brilinta) 90 MG Take 1 tablet (90 mg total) by mouth every 12 (twelve) hours (Patient not taking: No sig reported) 180 tablet 2    zinc sulfate (ZINCATE) 220 mg capsule Take 1 capsule (220 mg total) by mouth daily 30 capsule 0     No current facility-administered medications for this visit  He is allergic to amoxicillin       Review of Systems   Constitutional: Negative  HENT: Negative for ear pain and hearing loss  Eyes: Negative for pain  Respiratory: Negative for chest tightness and shortness of breath  Cardiovascular: Positive for leg swelling  Negative for chest pain and palpitations  Gastrointestinal: Negative for diarrhea, nausea and vomiting  Genitourinary: Negative for dysuria  Musculoskeletal: Negative for gait problem  Skin: Positive for wound  Neurological: Negative for tremors and weakness  Psychiatric/Behavioral: Negative for behavioral problems, confusion and suicidal ideas  Objective:       Wound 03/07/22 Surgical Leg Left;Upper;Medial (Active)   Wound Image Images linked 10/04/22 0834   Wound Description Pink;Granulation tissue; Yellow;Slough; Epithelialization 10/04/22 0834   Sarika-wound Assessment Edema;Pink;Scar Tissue 10/04/22 0834   Wound Length (cm) 3 2 cm 10/04/22 0834   Wound Width (cm) 12 cm 10/04/22 0834   Wound Depth (cm) 0 1 cm 10/04/22 0834   Wound Surface Area (cm^2) 38 4 cm^2 10/04/22 0834   Wound Volume (cm^3) 3 84 cm^3 10/04/22 0834   Calculated Wound Volume (cm^3) 3 84 cm^3 10/04/22 0834   Change in Wound Size % 34 69 10/04/22 0834   Undermining 0 2 10/04/22 0834   Undermining is depth extending from 6-9 o'clock 10/04/22 0834   Drainage Amount Moderate 10/04/22 0834   Drainage Description Yellow 10/04/22 0834   Non-staged Wound Description Full thickness 10/04/22 0834   Dressing Status Intact; Old drainage (upon arrival) 10/04/22 0834       Wound 04/11/22 Incision Abdomen Left;Medial;Lower (Active)   Wound Image Images linked 10/04/22 0840   Wound Description Granulation tissue;Slough; Yellow;Epithelialization 10/04/22 0840   Sarika-wound Assessment Pink;Scar Tissue; Induration 10/04/22 0840   Wound Length (cm) 1 cm (two spots open) 10/04/22 0840   Wound Width (cm) 2 5 cm 10/04/22 0840   Wound Depth (cm) 0 1 cm 10/04/22 0840   Wound Surface Area (cm^2) 2 5 cm^2 10/04/22 0840   Wound Volume (cm^3) 0 25 cm^3 10/04/22 0840   Calculated Wound Volume (cm^3) 0 25 cm^3 10/04/22 0840   Change in Wound Size % -56 25 10/04/22 0840   Drainage Amount Small 10/04/22 0840   Drainage Description Yellow;Purulent 10/04/22 0840   Dressing Status Other (Comment) (no dressing upon arrival) 10/04/22 0840       Wound 08/23/22 Incision Thigh Left;Proximal;Medial (Active)   Wound Image Images linked 10/04/22 0844   Wound Description Granulation tissue;Pink;Slough; Yellow 10/04/22 0844   Sarika-wound Assessment Pink 10/04/22 0844   Wound Length (cm) 0 1 cm 10/04/22 0844   Wound Width (cm) 0 5 cm 10/04/22 0844   Wound Depth (cm) 0 5 cm 10/04/22 0844   Wound Surface Area (cm^2) 0 05 cm^2 10/04/22 0844   Wound Volume (cm^3) 0 025 cm^3 10/04/22 0844   Calculated Wound Volume (cm^3) 0 03 cm^3 10/04/22 0844   Change in Wound Size % 96 67 10/04/22 0844   Drainage Amount Small 10/04/22 0844   Drainage Description Yellow 10/04/22 0844   Non-staged Wound Description Full thickness 10/04/22 0844   Dressing Status Other (Comment) (no dressing upon arrival) 10/04/22 0844 /91   Pulse 81   Temp 97 6 °F (36 4 °C)   Resp 17                         Wound Instructions:  Orders Placed This Encounter   Procedures    Wound cleansing and dressings       Left Upper Left Leg/groin:      Wash your hands with soap and water  Remove old dressing, discard into plastic bag and place in trash  Cleanse the wound with soap and water (cleaned with Prophase today)prior to applying a clean dressing  Do not use tissue or cotton balls  Do not scrub the wound  Pat dry using gauze  Shower yes   Apply skin prep to skin surrounding wound  Apply Aquacel AG to the leg/groin wound  Cover with ABD  Secure with Medipore tape  Change dressing daily            This was done today      Wait 24 hrs until next shower (post debridement)                                                                                         Standing Status:   Future     Standing Expiration Date:   10/4/2023    Wound cleansing and dressings     Left Leg incision     Wash your hands with soap and water  Remove old dressing, discard into plastic bag and place in trash  Cleanse the wound with normal saline prior to applying a clean dressing  Do not use tissue or cotton balls  Do not scrub the wound  Pat dry using gauze  Shower yes - with wound covered  Apply skin prep to skin surrounding wound  Apply Woun'dres to wound  Cover with border gauze     Change dressing daily     This was done today                  Standing Status:   Future     Standing Expiration Date:   10/4/2023    Wound compression and edema control     Wound compression and edema control  Continue using your lymphedema pump 1-2 times per day                     Standing Status:   Future     Standing Expiration Date:   10/4/2023    Wound cleansing and dressings     Lower abdominal wound:        Apply warm compresses to the abdominal wound 1- 2x/day until compress cools down  Wash your hands with soap and water    Remove old dressing, discard into plastic bag and place in trash  Cleanse the wound with soap and water prior to applying a clean dressing  Do not use tissue or cotton balls  Do not scrub the wound  Pat dry using gauze  Shower yes   Apply mupirocin ointment to the wounds  Cover with gauze as long as it is draining  Secure with medipore tape  Change dressing daily      This was done today     Standing Status:   Future     Standing Expiration Date:   10/4/2023    Wound miscellaneous orders     Have ultrasound of abdomen done as ordered  Standing Status:   Future     Standing Expiration Date:   10/4/2023    Debridement     This order was created via procedure documentation    Debridement     This order was created via procedure documentation    US abdomen complete     Standing Status:   Future     Standing Expiration Date:   10/4/2026     Scheduling Instructions:      NPO Adult Preps- patients over 15years of age             If your appointment is scheduled BEFORE 12:00pm, you may not eat or drink anything other than water, after midnight on the day of you test  Medications can be taken with water  Diabetics can have juice  If your appointment is AFTER 12:00pm, you may have a fat free breakfast before 8:00am and remain on clear liquids (no soda, you may have clear tea without dairy, juice or water) until your test is complete  If your appointment is scheduled AFTER 4:00pm, you may not have anything to eat after 12 noon on the day of the test  Water and juice is allowed up to the appointment time  Do not drink any carbonated beverages  Medications may be taken as needed with water  Diabetics who need PM appointments may have a light breakfast but cannot have any additional food or drinks after breakfast  Schedule PM appointments for a time 6-8 hours after that morning meal                   NPO Pediatric Preps birth to 15 years             Infants- Birth to 3 year of age   Hold the feeding closest to the appointment time but not more than 2 hours prior  Do not bottle or breast feed or give solid foods to the baby during that 2 hour period prior to the appointment  1 year to 12 years- low fat meal (no dairy) a minimum of 4 hours prior to the appointment  No carbonated beverages are allowed  Water, Juice and clear tea are allowed  Medications may be taken with water  ---------------------------------------------------------------------------------------------------------------------             ELASTOGRAPHY PATIENST ONLY: all preps are for an 6-8 hour period- MINIMUM- NO Medications allowed during that 8 hour period, sips of water or ice chips can be allowed  Please bring your insurance cards, a form of photo ID and a list of your medications with you  Arrive 15 minutes prior to your appointment time in order to register  To schedule this appointment, please contact Central Scheduling at [phone]  Diagnosis ICD-10-CM Associated Orders   1  Non-healing surgical wound, initial encounter  T81 89XA lidocaine (XYLOCAINE) 4 % topical solution 5 mL     Wound cleansing and dressings     Wound cleansing and dressings     Wound compression and edema control     Wound cleansing and dressings     Wound miscellaneous orders     Debridement   2  Abscess of left groin  L02 214 lidocaine (XYLOCAINE) 4 % topical solution 5 mL     Wound cleansing and dressings     Wound cleansing and dressings     Wound compression and edema control     Wound cleansing and dressings     Wound miscellaneous orders     Debridement   3  Acquired lymphedema of leg  I89 0 Wound cleansing and dressings     Wound cleansing and dressings     Wound compression and edema control     Wound cleansing and dressings     Wound miscellaneous orders   4   Morbid obesity due to excess calories ([facility])  E66 01 Wound cleansing and dressings     Wound cleansing and dressings Wound compression and edema control     Wound cleansing and dressings     Wound miscellaneous orders   5  Type 2 diabetes mellitus with diabetic polyneuropathy, without long-term current use of insulin (HCC)  E11 42 Wound cleansing and dressings     Wound cleansing and dressings     Wound compression and edema control     Wound cleansing and dressings     Wound miscellaneous orders   6   Abscess of skin of abdomen  L02 211 US abdomen complete

## 2022-10-20 ENCOUNTER — HOSPITAL ENCOUNTER (OUTPATIENT)
Dept: RADIOLOGY | Facility: HOSPITAL | Age: 42
Discharge: HOME | End: 2022-10-20
Payer: COMMERCIAL

## 2022-10-20 DIAGNOSIS — L02.211 ABSCESS OF SKIN OF ABDOMEN: ICD-10-CM

## 2022-10-20 PROCEDURE — 76705 ECHO EXAM OF ABDOMEN: CPT

## 2022-10-26 DIAGNOSIS — I10 ESSENTIAL HYPERTENSION: ICD-10-CM

## 2022-10-26 RX ORDER — AMLODIPINE BESYLATE 10 MG/1
TABLET ORAL
Qty: 90 TABLET | Refills: 1 | Status: SHIPPED | OUTPATIENT
Start: 2022-10-26

## 2022-11-03 ENCOUNTER — OFFICE VISIT (OUTPATIENT)
Dept: WOUND CARE | Facility: HOSPITAL | Age: 42
End: 2022-11-03

## 2022-11-03 VITALS
TEMPERATURE: 96.3 F | DIASTOLIC BLOOD PRESSURE: 110 MMHG | RESPIRATION RATE: 18 BRPM | SYSTOLIC BLOOD PRESSURE: 151 MMHG | HEART RATE: 96 BPM

## 2022-11-03 DIAGNOSIS — Z91.199 NONCOMPLIANCE WITH TREATMENT: ICD-10-CM

## 2022-11-03 DIAGNOSIS — L02.211 ABSCESS OF SKIN OF ABDOMEN: ICD-10-CM

## 2022-11-03 DIAGNOSIS — T81.89XA NON-HEALING SURGICAL WOUND, INITIAL ENCOUNTER: Primary | ICD-10-CM

## 2022-11-03 DIAGNOSIS — E11.42 TYPE 2 DIABETES MELLITUS WITH DIABETIC POLYNEUROPATHY, WITHOUT LONG-TERM CURRENT USE OF INSULIN (HCC): ICD-10-CM

## 2022-11-03 DIAGNOSIS — L02.214 ABSCESS OF LEFT GROIN: ICD-10-CM

## 2022-11-03 DIAGNOSIS — L73.2 HIDRADENITIS SUPPURATIVA: ICD-10-CM

## 2022-11-03 DIAGNOSIS — E66.01 MORBID OBESITY WITH BODY MASS INDEX (BMI) OF 50.0 TO 59.9 IN ADULT (HCC): ICD-10-CM

## 2022-11-03 DIAGNOSIS — I89.0 LYMPHEDEMA OF LEFT LOWER EXTREMITY: ICD-10-CM

## 2022-11-03 RX ORDER — CLINDAMYCIN PHOSPHATE 10 MG/G
GEL TOPICAL 2 TIMES DAILY
Qty: 30 G | Refills: 0 | Status: SHIPPED | OUTPATIENT
Start: 2022-11-03

## 2022-11-03 NOTE — LETTER
November 3, 2022     Patient: Rubina Wu  YOB: 1980  Date of Visit: 11/3/2022      To Whom it May Concern:    Abraham Marcos is under my professional care  Daljit Dennis was seen in my office on 11/3/2022  Daljit Dennis may return to work on 11/4/2022  If you have any questions or concerns, please don't hesitate to call           Sincerely,          Marnie Lmeon MD        CC: No Recipients

## 2022-11-03 NOTE — PATIENT INSTRUCTIONS
Orders Placed This Encounter   Procedures    Wound cleansing and dressings     Left Upper Left Leg wound      Wash your hands with soap and water  Remove old dressing, discard into plastic bag and place in trash  Cleanse the wound with soap and water (cleaned with Prophase today)prior to applying a clean dressing  Do not use tissue or cotton balls  Do not scrub the wound  Pat dry using gauze  Shower yes - with wounds covered  Apply skin prep to skin surrounding wound  Apply Aquacel AG to the leg/groin wound  Cover with ABD  Secure with Medipore tape  Change dressing daily  This was done today  Standing Status:   Future     Standing Expiration Date:   11/3/2023    Wound cleansing and dressings     Left Leg incision     Wash your hands with soap and water  Remove old dressing, discard into plastic bag and place in trash  Cleanse the wound with normal saline prior to applying a clean dressing  Do not use tissue or cotton balls  Do not scrub the wound  Pat dry using gauze  Shower yes - with wound covered  Apply skin prep to skin surrounding wound  Apply Woun'dres to wound  Cover with border gauze      Change dressing daily     This was done today                                              Standing Status:   Future     Standing Expiration Date:   11/3/2023    Wound cleansing and dressings     Lower abdominal wound:        Apply warm compresses to the abdominal wound 1- 2x/day until compress cools down  Wash your hands with soap and water  Remove old dressing, discard into plastic bag and place in trash  Cleanse the wound with soap and water prior to applying a clean dressing  Do not use tissue or cotton balls  Do not scrub the wound  Pat dry using gauze  Shower yes - with wounds covered  Apply Clindamycin ointment to the wounds     Cover with adaptic and gauze   Secure with medipore tape  Change dressing daily  This was done today     Standing Status:   Future     Standing Expiration Date:   11/3/2023    Wound compression and edema control     Wound compression and edema control  Continue using your lymphedema pump 1-2 times per day  Standing Status:   Future     Standing Expiration Date:   11/3/2023    Wound miscellaneous orders     Contact [facility] to set up appointment       Standing Status:   Future     Standing Expiration Date:   11/3/2023

## 2022-11-03 NOTE — PROGRESS NOTES
Patient ID: Kristopher Winter is a 40 y o  male Date of Birth 1980     Chief Complaint  Chief Complaint   Patient presents with   • Follow Up Wound Care Visit     Left thigh and ABD wounds       Allergies  Amoxicillin    Assessment:     Diagnoses and all orders for this visit:    Non-healing surgical wound, initial encounter  -     Wound cleansing and dressings; Future  -     Wound cleansing and dressings; Future  -     Wound cleansing and dressings; Future  -     Wound compression and edema control; Future  -     Wound miscellaneous orders; Future  -     Debridement    Type 2 diabetes mellitus with diabetic polyneuropathy, without long-term current use of insulin (HCC)  -     Wound cleansing and dressings; Future  -     Wound cleansing and dressings; Future  -     Wound cleansing and dressings; Future  -     Wound compression and edema control; Future  -     Wound miscellaneous orders; Future    History of hidradenitis suppurativa  -     Wound cleansing and dressings; Future  -     Wound cleansing and dressings; Future  -     Wound cleansing and dressings; Future  -     Wound compression and edema control; Future  -     Wound miscellaneous orders; Future    Morbid obesity with body mass index (BMI) of 50 0 to 59 9 in [facility])  -     Wound cleansing and dressings; Future  -     Wound cleansing and dressings; Future  -     Wound cleansing and dressings; Future  -     Wound compression and edema control; Future  -     Wound miscellaneous orders; Future    Noncompliance with treatment  -     Wound cleansing and dressings; Future  -     Wound cleansing and dressings; Future  -     Wound cleansing and dressings; Future  -     Wound compression and edema control; Future  -     Wound miscellaneous orders; Future    Abscess of skin of abdomen  -     Wound cleansing and dressings; Future  -     Wound cleansing and dressings; Future  -     Wound cleansing and dressings;  Future  -     Wound compression and edema control; Future  -     Wound miscellaneous orders; Future  -     clindamycin (CLINDAGEL) 1 % gel; Apply topically 2 (two) times a day    Abscess of left groin  -     Wound cleansing and dressings; Future  -     Wound cleansing and dressings; Future  -     Wound cleansing and dressings; Future  -     Wound compression and edema control; Future  -     Wound miscellaneous orders; Future    Lymphedema of left lower extremity              Debridement   Wound 08/23/22 Incision Thigh Left;Proximal;Medial    Universal Protocol:  Procedure performed by:  Consent: Verbal consent obtained  Risks and benefits: risks, benefits and alternatives were discussed  Consent given by: patient  Time out: Immediately prior to procedure a "time out" was called to verify the correct patient, procedure, equipment, support staff and site/side marked as required    Timeout called at: 11/3/2022 3:10 PM   Patient identity confirmed: verbally with patient      Performed by: physician  Debridement type: selective  Pain control: lidocaine 4%  Pre-debridement measurements  Length (cm): 0 4  Width (cm): 2  Depth (cm): 0 1  Surface Area (cm^2): 0 8  Volume (cm^3): 0 08    Post-debridement measurements  Length (cm): 0 4  Width (cm): 2  Depth (cm): 0 1  Percent debrided: 90%  Surface Area (cm^2): 0 8  Area debrided (cm^2): 0 72  Volume (cm^3): 0 08  Devitalized tissue debrided: biofilm, exudate and fibrin  Instrument(s) utilized: curette  Bleeding: small  Hemostasis obtained with: pressure  Procedural pain (0-10): 1  Post-procedural pain: 1   Response to treatment: procedure was tolerated well          Plan:   Follow up visit today for abdominal wounds, incisional thigh wound, and surgical thigh wound  Abdominal wound - care change to clindamycin topical gel as below  See wound care instructions below for other wounds  Notably, patient does have a history of HS that could be contributing to repeat abdominal wounds  Clindamycin gel sent to pharmacy today to be applied to abdominal wounds  Consider adding collagen to alginate over lower left thigh wound at next visit  Patient needs to follow up with Dermatology and PT lymphedema  Most recent appointments were either No shows or Cancelled  Per prior PT note, patient would benefit from continued therapy  Advised patient to follow up with both services today  Recent U/S of the abdomen read as equivocal but less likely to be ultrasound  Will continue to monitor  If exam findings become more suspicious, will refer patient to general surgery  Work note generated for patient today to excuse his absence today  Additional time taken reviewing hospitalizations this past year and notes from PT and primary care  Follow up in 3 weeks  We would like to see the patient more often, but he is not able to come due to work  He is aware that he should call our office or go to ER/Urgent Care for any symptoms/signs of infection  Call for any questions    Wound 03/07/22 Surgical Leg Left;Upper;Medial (Active)   Wound Image Images linked 11/03/22 1428   Wound Description Pink;Granulation tissue; Yellow;Slough; Epithelialization 11/03/22 1428   Sarika-wound Assessment Edema;Scar Tissue; Maceration 11/03/22 1428   Wound Length (cm) 3 2 cm 11/03/22 1428   Wound Width (cm) 12 cm 11/03/22 1428   Wound Depth (cm) 0 1 cm 11/03/22 1428   Wound Surface Area (cm^2) 38 4 cm^2 11/03/22 1428   Wound Volume (cm^3) 3 84 cm^3 11/03/22 1428   Calculated Wound Volume (cm^3) 3 84 cm^3 11/03/22 1428   Change in Wound Size % 34 69 11/03/22 1428   Undermining 0 3 11/03/22 1428   Undermining is depth extending from 6-9 oclock 11/03/22 1428   Drainage Amount Large 11/03/22 1428   Drainage Description Yellow 11/03/22 1428   Non-staged Wound Description Full thickness 11/03/22 1428   Dressing Status Intact; Old drainage 11/03/22 1428       Wound 04/11/22 Incision Abdomen Left;Medial;Lower (Active)   Wound Image Images linked 11/03/22 1436   Wound Description Granulation tissue; Epithelialization;Pink 11/03/22 1435   Sarika-wound Assessment Pink;Scar Tissue; Induration 11/03/22 1435   Wound Length (cm) 0 1 cm (2 pin point openings ) 11/03/22 1435   Wound Width (cm) 1 5 cm 11/03/22 1435   Wound Depth (cm) 0 1 cm 11/03/22 1435   Wound Surface Area (cm^2) 0 15 cm^2 11/03/22 1435   Wound Volume (cm^3) 0 015 cm^3 11/03/22 1435   Calculated Wound Volume (cm^3) 0 02 cm^3 11/03/22 1435   Change in Wound Size % 87 5 11/03/22 1435   Drainage Amount Scant 11/03/22 1435   Drainage Description Milky 11/03/22 1435   Non-staged Wound Description Full thickness 11/03/22 1435   Dressing Status Other (Comment) (no dressing) 11/03/22 1435       Wound 08/23/22 Incision Thigh Left;Proximal;Medial (Active)   Wound Image Images linked 11/03/22 1432   Wound Description Granulation tissue;Pink 11/03/22 1432   Sarika-wound Assessment Pink 11/03/22 1432   Wound Length (cm) 0 4 cm 11/03/22 1432   Wound Width (cm) 2 cm 11/03/22 1432   Wound Depth (cm) 0 1 cm 11/03/22 1432   Wound Surface Area (cm^2) 0 8 cm^2 11/03/22 1432   Wound Volume (cm^3) 0 08 cm^3 11/03/22 1432   Calculated Wound Volume (cm^3) 0 08 cm^3 11/03/22 1432   Change in Wound Size % 91 11 11/03/22 1432   Undermining 0 4 11/03/22 1432   Undermining is depth extending from 9-10 o'clock 11/03/22 1432   Drainage Amount Scant 11/03/22 1432   Drainage Description Serous 11/03/22 1432   Non-staged Wound Description Full thickness 11/03/22 1432   Dressing Status Other (Comment) (no dressing) 11/03/22 1432       Wound 03/07/22 Surgical Leg Left;Upper;Medial (Active)   Date First Assessed/Time First Assessed: 03/07/22 0833   Primary Wound Type: Surgical  Location: Leg  Wound Location Orientation: Left;Upper;Medial  Wound Outcome: (c)        Wound 04/11/22 Incision Abdomen Left;Medial;Lower (Active)   Date First Assessed: 04/11/22   Primary Wound Type:  Incision  Location: Abdomen  Wound Location Orientation: Left;Medial;Lower       Wound 08/23/22 Incision Thigh Left;Proximal;Medial (Active)   Date First Assessed: 08/23/22   Primary Wound Type: Incision  Location: Thigh  Wound Location Orientation: Left;Proximal;Medial       [REMOVED] Wound 05/26/21 Incision Thigh Left (Removed)   Resolved Date: 03/07/22  Date First Assessed/Time First Assessed: 05/26/21 1914   Pre-Existing Wound: Yes  Primary Wound Type: Incision  Location: Thigh  Wound Location Orientation: Left  Wound Description (Comments): 2 incisions  Incision's 1st Dress    [REMOVED] Wound 05/26/21 Surgical Other (Comment) Groin Left (Removed)   Resolved Date: 03/07/22  Date First Assessed/Time First Assessed: 05/26/21 1916   Pre-Existing Wound: Yes  Primary Wound Type: Surgical  Traumatic Wound Type: (c) Other (Comment)  Location: Groin  Wound Location Orientation: Left  Wound Outcome: Unkno    [REMOVED] Wound 06/01/21 Other (comment) Cellulitis Pretibial Left (Removed)   Resolved Date: 03/07/22  Date First Assessed/Time First Assessed: 06/01/21 1930   Pre-Existing Wound: Yes  Primary Wound Type: Other (comment)  Traumatic Wound Type: Cellulitis  Location: Pretibial  Wound Location Orientation: Left  Dressing Status: C  [REMOVED] Wound 06/02/21 Hip Anterior;Right (Removed)   Resolved Date: 03/07/22  Date First Assessed/Time First Assessed: 06/02/21 1028   Location: Hip  Wound Location Orientation: Anterior;Right  Wound Outcome: Unknown (No longer present)       [REMOVED] Wound 06/02/21 Sacrum (Removed)   Resolved Date: 03/07/22  Date First Assessed/Time First Assessed: 06/02/21 1034   Location: Sacrum  Wound Outcome: Unknown (No longer present)       [REMOVED] Wound 07/06/21 Leg Left (Removed)   Resolved Date: 03/07/22  Date First Assessed/Time First Assessed: 07/06/21 1639   Location: Leg  Wound Location Orientation: Left  Incision's 1st Dressing: KERLIX SUPER SPONGE 6 IN (x2), ACE WRAP 6 IN UNSTERILE (x4), SPONGE GAUZE 4 X 4 16 PLY STRL FÁTIMA           [REMOVED] Wound 03/30/22 Sacrum (Removed) Resolved Date: 04/11/22  Date First Assessed/Time First Assessed: 03/30/22 1610   Location: Sacrum  Wound Description (Comments): 4/1/22 Stage 2 POA  Wound Outcome: (c) Other (Comment)       [REMOVED] Wound 03/30/22 Surgical Thigh Anterior; Left (Removed)   Resolved Date: 04/11/22  Date First Assessed/Time First Assessed: 03/30/22 1614   Pre-Existing Wound: Yes  Primary Wound Type: Surgical  Location: Thigh  Wound Location Orientation: Anterior; Left  Wound Description (Comments): Surgical Wound  Dressing    [REMOVED] Wound 03/30/22 Abdomen Left (Removed)   Resolved Date: 04/11/22  Date First Assessed/Time First Assessed: 03/30/22 1616   Location: Abdomen  Wound Location Orientation: Left  Wound Outcome: (c) Other (Comment)       [REMOVED] Wound 03/30/22 Thigh Anterior;Right (Removed)   Resolved Date: 04/11/22  Date First Assessed/Time First Assessed: 03/30/22 1617   Location: Thigh  Wound Location Orientation: Anterior;Right  Wound Outcome: (c) Other (Comment)       [REMOVED] Wound 03/30/22 Abscess Cellulitis Thigh Anterior;Left;Proximal (Removed)   Resolved Date: 04/11/22  Date First Assessed/Time First Assessed: 03/30/22 1618   Primary Wound Type: Abscess  Traumatic Wound Type: Cellulitis  Location: Thigh  Wound Location Orientation: Anterior;Left;Proximal  Dressing Status: New drainage  Wound     [REMOVED] Wound 05/04/22 Leg Left; Inner; Lower (Removed)   Resolved Date: 06/21/22  Date First Assessed/Time First Assessed: 05/04/22 2200   Pre-Existing Wound: Yes  Location: Leg  Wound Location Orientation: Left; Inner; Lower  Wound Outcome: (c) Other (Comment)       [REMOVED] Wound 05/04/22 Other (comment) Sacrum (Removed)   Resolved Date: 06/21/22  Date First Assessed/Time First Assessed: 05/04/22 0001   Pre-Existing Wound: Yes  Primary Wound Type: Other (comment)  Location: Sacrum  Wound Description (Comments): Pt states wound is closed/healed & declined assessment 6/21           Subjective:           42 year old male here today for follow up of multiple wounds  1  Abdominal wounds- history of multiple abdominal wounds of the pannus complicated by abscess and cellulitis in the past  Wounds appear the same today  2  Surgical wound of L thigh - first acquired in April 2021 when elective surgery was performed to remove a large soft tissue mass thought to be 2/2 worsening hidradenitis suppurativa  Patient did have previous discussion about the possible need for skin graft, however he is concerned with creating a new wound  Wound is unchanged  Patient has been using aquacel ag as directed  3  L incisional groin wound - formed in august 2022 from I&D of abscess  Overall improving  Patient has been using woundres as directed at home      The following portions of the patient's history were reviewed and updated as appropriate: allergies, current medications, past family history, past medical history, past social history, past surgical history and problem list     Review of Systems   Skin: Positive for wound  All other systems reviewed and are negative  Objective:       Wound 03/07/22 Surgical Leg Left;Upper;Medial (Active)   Wound Image Images linked 11/03/22 1428   Wound Description Pink;Granulation tissue; Yellow;Slough; Epithelialization 11/03/22 1428   Sarika-wound Assessment Edema;Scar Tissue; Maceration 11/03/22 1428   Wound Length (cm) 3 2 cm 11/03/22 1428   Wound Width (cm) 12 cm 11/03/22 1428   Wound Depth (cm) 0 1 cm 11/03/22 1428   Wound Surface Area (cm^2) 38 4 cm^2 11/03/22 1428   Wound Volume (cm^3) 3 84 cm^3 11/03/22 1428   Calculated Wound Volume (cm^3) 3 84 cm^3 11/03/22 1428   Change in Wound Size % 34 69 11/03/22 1428   Undermining 0 3 11/03/22 1428   Undermining is depth extending from 6-9 oclock 11/03/22 1428   Drainage Amount Large 11/03/22 1428   Drainage Description Yellow 11/03/22 1428   Non-staged Wound Description Full thickness 11/03/22 1428   Dressing Status Intact; Old drainage 11/03/22 1428       Wound 04/11/22 Incision Abdomen Left;Medial;Lower (Active)   Wound Image Images linked 11/03/22 1436   Wound Description Granulation tissue; Epithelialization;Pink 11/03/22 1435   Sarika-wound Assessment Pink;Scar Tissue; Induration 11/03/22 1435   Wound Length (cm) 0 1 cm (2 pin point openings ) 11/03/22 1435   Wound Width (cm) 1 5 cm 11/03/22 1435   Wound Depth (cm) 0 1 cm 11/03/22 1435   Wound Surface Area (cm^2) 0 15 cm^2 11/03/22 1435   Wound Volume (cm^3) 0 015 cm^3 11/03/22 1435   Calculated Wound Volume (cm^3) 0 02 cm^3 11/03/22 1435   Change in Wound Size % 87 5 11/03/22 1435   Drainage Amount Scant 11/03/22 1435   Drainage Description Milky 11/03/22 1435   Non-staged Wound Description Full thickness 11/03/22 1435   Dressing Status Other (Comment) (no dressing) 11/03/22 1435       Wound 08/23/22 Incision Thigh Left;Proximal;Medial (Active)   Wound Image Images linked 11/03/22 1432   Wound Description Granulation tissue;Pink 11/03/22 1432   Sarika-wound Assessment Pink 11/03/22 1432   Wound Length (cm) 0 4 cm 11/03/22 1432   Wound Width (cm) 2 cm 11/03/22 1432   Wound Depth (cm) 0 1 cm 11/03/22 1432   Wound Surface Area (cm^2) 0 8 cm^2 11/03/22 1432   Wound Volume (cm^3) 0 08 cm^3 11/03/22 1432   Calculated Wound Volume (cm^3) 0 08 cm^3 11/03/22 1432   Change in Wound Size % 91 11 11/03/22 1432   Undermining 0 4 11/03/22 1432   Undermining is depth extending from 9-10 o'clock 11/03/22 1432   Drainage Amount Scant 11/03/22 1432   Drainage Description Serous 11/03/22 1432   Non-staged Wound Description Full thickness 11/03/22 1432   Dressing Status Other (Comment) (no dressing) 11/03/22 1432       BP (!) 151/110 Comment: did not take bp medicine  Pulse 96   Temp (!) 96 3 °F (35 7 °C)   Resp 18     Physical Exam  Vitals reviewed  HENT:      Head: Normocephalic and atraumatic  Eyes:      Extraocular Movements: Extraocular movements intact     Pulmonary:      Effort: Pulmonary effort is normal  Skin:     Comments: Wounds as below   Neurological:      Mental Status: He is alert  Psychiatric:         Mood and Affect: Mood normal          Behavior: Behavior normal            Wound Instructions:  Orders Placed This Encounter   Procedures   • Wound cleansing and dressings     Left Upper Left Leg wound      Wash your hands with soap and water  Remove old dressing, discard into plastic bag and place in trash  Cleanse the wound with soap and water (cleaned with Prophase today)prior to applying a clean dressing  Do not use tissue or cotton balls  Do not scrub the wound  Pat dry using gauze  Shower yes - with wounds covered  Apply skin prep to skin surrounding wound  Apply Aquacel AG to the leg/groin wound  Cover with ABD  Secure with Medipore tape  Change dressing daily            This was done today                                                                                                                   Standing Status:   Future     Standing Expiration Date:   11/3/2023   • Wound cleansing and dressings     Left Leg incision     Wash your hands with soap and water  Remove old dressing, discard into plastic bag and place in trash  Cleanse the wound with normal saline prior to applying a clean dressing  Do not use tissue or cotton balls  Do not scrub the wound  Pat dry using gauze  Shower yes - with wound covered  Apply skin prep to skin surrounding wound  Apply Woun'dres to wound  Cover with border gauze      Change dressing daily     This was done today                                              Standing Status:   Future     Standing Expiration Date:   11/3/2023   • Wound cleansing and dressings     Lower abdominal wound:        Apply warm compresses to the abdominal wound 1- 2x/day until compress cools down  Wash your hands with soap and water  Remove old dressing, discard into plastic bag and place in trash  Cleanse the wound with soap and water prior to applying a clean dressing  Do not use tissue or cotton balls  Do not scrub the wound  Pat dry using gauze  Shower yes - with wounds covered  Apply Clindamycin ointment to the wounds  Cover with adaptic and gauze   Secure with medipore tape  Change dressing daily      This was done today     Standing Status:   Future     Standing Expiration Date:   11/3/2023   • Wound compression and edema control     Wound compression and edema control  Continue using your lymphedema pump 1-2 times per day                     Standing Status:   Future     Standing Expiration Date:   11/3/2023   • Wound miscellaneous orders     Contact [facility] to set up appointment  Standing Status:   Future     Standing Expiration Date:   11/3/2023   • Debridement     This order was created via procedure documentation        Diagnosis ICD-10-CM Associated Orders   1  Non-healing surgical wound, initial encounter  T81 [id number] Wound cleansing and dressings     Wound cleansing and dressings     Wound cleansing and dressings     Wound compression and edema control     Wound miscellaneous orders     Debridement   2  Type 2 diabetes mellitus with diabetic polyneuropathy, without long-term current use of insulin (HCC)  E11 42 Wound cleansing and dressings     Wound cleansing and dressings     Wound cleansing and dressings     Wound compression and edema control     Wound miscellaneous orders   3  History of hidradenitis suppurativa  L73 2 Wound cleansing and dressings     Wound cleansing and dressings     Wound cleansing and dressings     Wound compression and edema control     Wound miscellaneous orders   4  Morbid obesity with body mass index (BMI) of 50 0 to 59 9 in adult [facility])  E66 01 Wound cleansing and dressings    Z68 43 Wound cleansing and dressings     Wound cleansing and dressings     Wound compression and edema control     Wound miscellaneous orders   5   Noncompliance with treatment  Z91 199 Wound cleansing and dressings     Wound cleansing and dressings     Wound cleansing and dressings     Wound compression and edema control     Wound miscellaneous orders   6  Abscess of skin of abdomen  L02 211 Wound cleansing and dressings     Wound cleansing and dressings     Wound cleansing and dressings     Wound compression and edema control     Wound miscellaneous orders     clindamycin (CLINDAGEL) 1 % gel   7  Abscess of left groin  L02 214 Wound cleansing and dressings     Wound cleansing and dressings     Wound cleansing and dressings     Wound compression and edema control     Wound miscellaneous orders   8   Lymphedema of left lower extremity  I89 0

## 2022-11-22 ENCOUNTER — OFFICE VISIT (OUTPATIENT)
Dept: WOUND CARE | Facility: HOSPITAL | Age: 42
End: 2022-11-22

## 2022-11-22 VITALS — SYSTOLIC BLOOD PRESSURE: 165 MMHG | TEMPERATURE: 96.7 F | DIASTOLIC BLOOD PRESSURE: 84 MMHG | HEART RATE: 73 BPM

## 2022-11-22 DIAGNOSIS — T81.89XA NON-HEALING SURGICAL WOUND, INITIAL ENCOUNTER: Primary | ICD-10-CM

## 2022-11-22 DIAGNOSIS — Z91.199 NONCOMPLIANCE WITH TREATMENT: ICD-10-CM

## 2022-11-22 DIAGNOSIS — L02.214 ABSCESS OF LEFT GROIN: ICD-10-CM

## 2022-11-22 DIAGNOSIS — L02.211 ABSCESS OF SKIN OF ABDOMEN: ICD-10-CM

## 2022-11-22 DIAGNOSIS — L73.2 HIDRADENITIS SUPPURATIVA: ICD-10-CM

## 2022-11-22 DIAGNOSIS — E11.42 TYPE 2 DIABETES MELLITUS WITH DIABETIC POLYNEUROPATHY, WITHOUT LONG-TERM CURRENT USE OF INSULIN (HCC): ICD-10-CM

## 2022-11-22 DIAGNOSIS — E66.01 MORBID OBESITY WITH BODY MASS INDEX (BMI) OF 50.0 TO 59.9 IN ADULT (HCC): ICD-10-CM

## 2022-11-22 RX ORDER — LIDOCAINE HYDROCHLORIDE 40 MG/ML
5 SOLUTION TOPICAL ONCE
Status: COMPLETED | OUTPATIENT
Start: 2022-11-22 | End: 2022-11-22

## 2022-11-22 RX ADMIN — LIDOCAINE HYDROCHLORIDE 5 ML: 40 SOLUTION TOPICAL at 08:42

## 2022-11-22 NOTE — PATIENT INSTRUCTIONS
Orders Placed This Encounter   Procedures    Wound cleansing and dressings     Lower abdominal wound:        Apply warm compresses to the abdominal wound 1- 2x/day until compress cools down  Wash your hands with soap and water  Remove old dressing, discard into plastic bag and place in trash  Cleanse the wound with soap and water prior to applying a clean dressing  Do not use tissue or cotton balls  Do not scrub the wound  Pat dry using gauze  Shower yes - with wounds covered  Apply Clindamycin ointment to the wounds  Cover with adaptic and gauze   Secure with medipore tape  Change dressing daily  This was done today     Standing Status:   Future     Standing Expiration Date:   11/22/2023    Wound cleansing and dressings     Left Leg incision     Wash your hands with soap and water  Remove old dressing, discard into plastic bag and place in trash  Cleanse the wound with normal saline prior to applying a clean dressing  Do not use tissue or cotton balls  Do not scrub the wound  Pat dry using gauze  Shower yes - with wound covered  Apply skin prep to skin surrounding wound  Apply Woun'dres to wound  Cover with border gauze      Change dressing daily     This was done today           Standing Status:   Future     Standing Expiration Date:   11/22/2023    Wound compression and edema control     Continue using your lymphedema pump 1-2 times per day  Standing Status:   Future     Standing Expiration Date:   11/22/2023    Wound miscellaneous orders     Contact [facility] to set up appointment     Standing Status:   Future     Standing Expiration Date:   11/22/2023    Wound cleansing and dressings     Left Upper Left Leg wound      Wash your hands with soap and water  Remove old dressing, discard into plastic bag and place in trash  Cleanse the wound with soap and water (cleaned with Prophase today)prior to applying a clean dressing  Do not use tissue or cotton balls   Do not scrub the wound  Pat dry using gauze  Shower yes - with wounds covered  Apply skin prep to skin surrounding wound  Apply Calcium alginate with silver to the leg/groin wound  Cover with ABD  Secure with Medipore tape  Change dressing daily  This was done today          Standing Status:   Future     Standing Expiration Date:   11/22/2023    Debridement     This order was created via procedure documentation

## 2022-11-22 NOTE — PROGRESS NOTES
Patient ID: Sky Candelario is a 40 y o  male Date of Birth 1980     Chief Complaint  No chief complaint on file  Allergies  Amoxicillin    Assessment:     Diagnoses and all orders for this visit:    Non-healing surgical wound, initial encounter  -     lidocaine (XYLOCAINE) 4 % topical solution 5 mL  -     Cancel: Wound cleansing and dressings; Future  -     Wound cleansing and dressings; Future  -     Wound cleansing and dressings; Future  -     Wound compression and edema control; Future  -     Wound miscellaneous orders; Future  -     Wound cleansing and dressings; Future    Type 2 diabetes mellitus with diabetic polyneuropathy, without long-term current use of insulin ([facility])  -     lidocaine (XYLOCAINE) 4 % topical solution 5 mL  -     Cancel: Wound cleansing and dressings; Future  -     Wound cleansing and dressings; Future  -     Wound cleansing and dressings; Future  -     Wound compression and edema control; Future  -     Wound miscellaneous orders; Future  -     Wound cleansing and dressings; Future    History of hidradenitis suppurativa  -     lidocaine (XYLOCAINE) 4 % topical solution 5 mL  -     Cancel: Wound cleansing and dressings; Future  -     Wound cleansing and dressings; Future  -     Wound cleansing and dressings; Future  -     Wound compression and edema control; Future  -     Wound miscellaneous orders; Future  -     Wound cleansing and dressings; Future    Morbid obesity with body mass index (BMI) of 50 0 to 59 9 in adult ([facility])  -     lidocaine (XYLOCAINE) 4 % topical solution 5 mL  -     Cancel: Wound cleansing and dressings; Future  -     Wound cleansing and dressings; Future  -     Wound cleansing and dressings; Future  -     Wound compression and edema control; Future  -     Wound miscellaneous orders; Future  -     Wound cleansing and dressings;  Future    Abscess of skin of abdomen  -     lidocaine (XYLOCAINE) 4 % topical solution 5 mL  -     Cancel: Wound cleansing and dressings; Future  -     Wound cleansing and dressings; Future  -     Wound cleansing and dressings; Future  -     Wound compression and edema control; Future  -     Wound miscellaneous orders; Future  -     Wound cleansing and dressings; Future    Abscess of left groin  -     lidocaine (XYLOCAINE) 4 % topical solution 5 mL  -     Cancel: Wound cleansing and dressings; Future  -     Wound cleansing and dressings; Future  -     Wound cleansing and dressings; Future  -     Wound compression and edema control; Future  -     Wound miscellaneous orders; Future  -     Wound cleansing and dressings; Future  -     Debridement    Noncompliance with treatment  -     lidocaine (XYLOCAINE) 4 % topical solution 5 mL  -     Cancel: Wound cleansing and dressings; Future  -     Wound cleansing and dressings; Future  -     Wound cleansing and dressings; Future  -     Wound compression and edema control; Future  -     Wound miscellaneous orders; Future  -     Wound cleansing and dressings; Future              Debridement   Wound 08/23/22 Incision Thigh Left;Proximal;Medial    Universal Protocol:  Procedure performed by:  Consent: Verbal consent obtained  Risks and benefits: risks, benefits and alternatives were discussed  Consent given by: patient  Time out: Immediately prior to procedure a "time out" was called to verify the correct patient, procedure, equipment, support staff and site/side marked as required    Timeout called at: 11/22/2022 8:55 AM   Patient identity confirmed: verbally with patient      Performed by: physician  Debridement type: selective  Pain control: lidocaine 4%  Pre-debridement measurements  Length (cm): 0 2  Width (cm): 0 7  Depth (cm): 0 2  Surface Area (cm^2): 0 14  Volume (cm^3): 0 03    Post-debridement measurements  Length (cm): 0 2  Width (cm): 0 7  Depth (cm): 0 2  Percent debrided: 90%  Surface Area (cm^2): 0 14  Area debrided (cm^2): 0 13  Volume (cm^3): 0 03  Devitalized tissue debrided: biofilm and fibrin  Instrument(s) utilized: curette  Bleeding: small  Hemostasis obtained with: pressure  Procedural pain (0-10): 1  Post-procedural pain: 1   Response to treatment: procedure was tolerated well          Plan:   Follow up wound care visit today  Surgical wound healing with wound dress  Other wounds about the same  Continue clindamycin to abdomen and silver alginate to thigh wound  Thigh wound still with significant drainage  Reese Jean feels that abdominal wounds feel improved  All wounds cleansed with NS and gauze today  Additional debridement as above  Follow up in 3 weeks  Call for any questions or concerns  Wound 03/07/22 Surgical Leg Left;Upper;Medial (Active)   Wound Image Images linked (Simultaneous filing  User may not have seen previous data ) 11/22/22 0832   Wound Description Pink;Granulation tissue; Yellow;Slough; Epithelialization 11/22/22 0832   Sarika-wound Assessment Scar Tissue; Maceration; Induration 11/22/22 0832   Wound Length (cm) 3 2 cm 11/22/22 0832   Wound Width (cm) 11 6 cm 11/22/22 0832   Wound Depth (cm) 0 2 cm 11/22/22 0832   Wound Surface Area (cm^2) 37 12 cm^2 11/22/22 0832   Wound Volume (cm^3) 7 424 cm^3 11/22/22 0832   Calculated Wound Volume (cm^3) 7 42 cm^3 11/22/22 0832   Change in Wound Size % -26 19 11/22/22 0832   Undermining 0 3 11/22/22 0832   Undermining is depth extending from 7-9:00 (lower wound) 11/22/22 0832   Drainage Amount Large 11/22/22 0832   Drainage Description Clear 11/22/22 0832   Non-staged Wound Description Full thickness 11/22/22 0832   Dressing Status New drainage; Old drainage (upon arrival) 11/22/22 0832       Wound 04/11/22 Incision Abdomen Left;Medial;Lower (Active)   Wound Image Images linked (Simultaneous filing  User may not have seen previous data ) 11/22/22 0825   Wound Description Granulation tissue; Epithelialization;Pink 11/22/22 0825   Sarika-wound Assessment Pink;Scar Tissue; Induration 11/22/22 0825   Wound Length (cm) 0 1 cm 11/22/22 0825   Wound Width (cm) 4 5 cm 11/22/22 0825   Wound Depth (cm) 0 1 cm 11/22/22 0825   Wound Surface Area (cm^2) 0 45 cm^2 11/22/22 0825   Wound Volume (cm^3) 0 045 cm^3 11/22/22 0825   Calculated Wound Volume (cm^3) 0 05 cm^3 11/22/22 0825   Change in Wound Size % 68 75 11/22/22 0825   Drainage Amount Scant 11/22/22 0825   Drainage Description Milky; Serosanguineous 11/22/22 0825   Non-staged Wound Description Full thickness 11/22/22 0825   Dressing Status New drainage (no drsg upon arrival) 11/22/22 0825       Wound 08/23/22 Incision Thigh Left;Proximal;Medial (Active)   Wound Image Images linked 11/22/22 0829   Wound Description Granulation tissue;Pink;Epithelialization (rolled edges) 11/22/22 0828   Sarika-wound Assessment Pink 11/22/22 0828   Wound Length (cm) 0 2 cm 11/22/22 0828   Wound Width (cm) 0 7 cm 11/22/22 0828   Wound Depth (cm) 0 2 cm 11/22/22 0828   Wound Surface Area (cm^2) 0 14 cm^2 11/22/22 0828   Wound Volume (cm^3) 0 028 cm^3 11/22/22 0828   Calculated Wound Volume (cm^3) 0 03 cm^3 11/22/22 0828   Change in Wound Size % 96 67 11/22/22 0828   Undermining 0 3 11/22/22 0828   Undermining is depth extending from 9-12:00 11/22/22 0828   Drainage Amount Scant 11/22/22 0828   Drainage Description Clear 11/22/22 0828   Dressing Status New drainage (upon arrival) 11/22/22 0828       Wound 03/07/22 Surgical Leg Left;Upper;Medial (Active)   Date First Assessed/Time First Assessed: 03/07/22 0833   Primary Wound Type: Surgical  Location: Leg  Wound Location Orientation: Left;Upper;Medial  Wound Outcome: (c)        Wound 04/11/22 Incision Abdomen Left;Medial;Lower (Active)   Date First Assessed: 04/11/22   Primary Wound Type: Incision  Location: Abdomen  Wound Location Orientation: Left;Medial;Lower       Wound 08/23/22 Incision Thigh Left;Proximal;Medial (Active)   Date First Assessed: 08/23/22   Primary Wound Type:  Incision  Location: Thigh  Wound Location Orientation: Left;Proximal;Medial       [REMOVED] Wound 05/26/21 Incision Thigh Left (Removed)   Resolved Date: 03/07/22  Date First Assessed/Time First Assessed: 05/26/21 1914   Pre-Existing Wound: Yes  Primary Wound Type: Incision  Location: Thigh  Wound Location Orientation: Left  Wound Description (Comments): 2 incisions  Incision's 1st Dress    [REMOVED] Wound 05/26/21 Surgical Other (Comment) Groin Left (Removed)   Resolved Date: 03/07/22  Date First Assessed/Time First Assessed: 05/26/21 1916   Pre-Existing Wound: Yes  Primary Wound Type: Surgical  Traumatic Wound Type: (c) Other (Comment)  Location: Groin  Wound Location Orientation: Left  Wound Outcome: Unkno    [REMOVED] Wound 06/01/21 Other (comment) Cellulitis Pretibial Left (Removed)   Resolved Date: 03/07/22  Date First Assessed/Time First Assessed: 06/01/21 1930   Pre-Existing Wound: Yes  Primary Wound Type: Other (comment)  Traumatic Wound Type: Cellulitis  Location: Pretibial  Wound Location Orientation: Left  Dressing Status: C  [REMOVED] Wound 06/02/21 Hip Anterior;Right (Removed)   Resolved Date: 03/07/22  Date First Assessed/Time First Assessed: 06/02/21 1028   Location: Hip  Wound Location Orientation: Anterior;Right  Wound Outcome: Unknown (No longer present)       [REMOVED] Wound 06/02/21 Sacrum (Removed)   Resolved Date: 03/07/22  Date First Assessed/Time First Assessed: 06/02/21 1034   Location: Sacrum  Wound Outcome: Unknown (No longer present)       [REMOVED] Wound 07/06/21 Leg Left (Removed)   Resolved Date: 03/07/22  Date First Assessed/Time First Assessed: 07/06/21 1639   Location: Leg  Wound Location Orientation: Left  Incision's 1st Dressing: KERLIX SUPER SPONGE 6 IN (x2), ACE WRAP 6 IN UNSTERILE (x4), SPONGE GAUZE 4 X 4 16 PLY STRL FÁTIMA           [REMOVED] Wound 03/30/22 Sacrum (Removed)   Resolved Date: 04/11/22  Date First Assessed/Time First Assessed: 03/30/22 1610   Location: Sacrum  Wound Description (Comments): 4/1/22 Stage 2 POA  Wound Outcome: (c) Other (Comment) [REMOVED] Wound 03/30/22 Surgical Thigh Anterior; Left (Removed)   Resolved Date: 04/11/22  Date First Assessed/Time First Assessed: 03/30/22 1614   Pre-Existing Wound: Yes  Primary Wound Type: Surgical  Location: Thigh  Wound Location Orientation: Anterior; Left  Wound Description (Comments): Surgical Wound  Dressing    [REMOVED] Wound 03/30/22 Abdomen Left (Removed)   Resolved Date: 04/11/22  Date First Assessed/Time First Assessed: 03/30/22 1616   Location: Abdomen  Wound Location Orientation: Left  Wound Outcome: (c) Other (Comment)       [REMOVED] Wound 03/30/22 Thigh Anterior;Right (Removed)   Resolved Date: 04/11/22  Date First Assessed/Time First Assessed: 03/30/22 1617   Location: Thigh  Wound Location Orientation: Anterior;Right  Wound Outcome: (c) Other (Comment)       [REMOVED] Wound 03/30/22 Abscess Cellulitis Thigh Anterior;Left;Proximal (Removed)   Resolved Date: 04/11/22  Date First Assessed/Time First Assessed: 03/30/22 1618   Primary Wound Type: Abscess  Traumatic Wound Type: Cellulitis  Location: Thigh  Wound Location Orientation: Anterior;Left;Proximal  Dressing Status: New drainage  Wound     [REMOVED] Wound 05/04/22 Leg Left; Inner; Lower (Removed)   Resolved Date: 06/21/22  Date First Assessed/Time First Assessed: 05/04/22 2200   Pre-Existing Wound: Yes  Location: Leg  Wound Location Orientation: Left; Inner; Lower  Wound Outcome: (c) Other (Comment)       [REMOVED] Wound 05/04/22 Other (comment) Sacrum (Removed)   Resolved Date: 06/21/22  Date First Assessed/Time First Assessed: 05/04/22 0001   Pre-Existing Wound: Yes  Primary Wound Type: Other (comment)  Location: Sacrum  Wound Description (Comments): Pt states wound is closed/healed & declined assessment 6/21    Subjective:           39 yo M here today for follow up chronic wounds on the abdomen and thigh and surgical wound of the L hip  Overall wounds remain about the same  Surgical wound slightly improved        The following portions of the patient's history were reviewed and updated as appropriate: allergies, current medications, past family history, past medical history, past social history, past surgical history and problem list     Review of Systems   Skin: Positive for wound  All other systems reviewed and are negative  Objective:       Wound 03/07/22 Surgical Leg Left;Upper;Medial (Active)   Wound Image Images linked (Simultaneous filing  User may not have seen previous data ) 11/22/22 0832   Wound Description Pink;Granulation tissue; Yellow;Slough; Epithelialization 11/22/22 0832   Sarika-wound Assessment Scar Tissue; Maceration; Induration 11/22/22 0832   Wound Length (cm) 3 2 cm 11/22/22 0832   Wound Width (cm) 11 6 cm 11/22/22 0832   Wound Depth (cm) 0 2 cm 11/22/22 0832   Wound Surface Area (cm^2) 37 12 cm^2 11/22/22 0832   Wound Volume (cm^3) 7 424 cm^3 11/22/22 0832   Calculated Wound Volume (cm^3) 7 42 cm^3 11/22/22 0832   Change in Wound Size % -26 19 11/22/22 0832   Undermining 0 3 11/22/22 0832   Undermining is depth extending from 7-9:00 (lower wound) 11/22/22 0832   Drainage Amount Large 11/22/22 0832   Drainage Description Clear 11/22/22 0832   Non-staged Wound Description Full thickness 11/22/22 0832   Dressing Status New drainage; Old drainage (upon arrival) 11/22/22 0832       Wound 04/11/22 Incision Abdomen Left;Medial;Lower (Active)   Wound Image Images linked (Simultaneous filing  User may not have seen previous data ) 11/22/22 0825   Wound Description Granulation tissue; Epithelialization;Pink 11/22/22 0825   Sarika-wound Assessment Pink;Scar Tissue; Induration 11/22/22 0825   Wound Length (cm) 0 1 cm 11/22/22 0825   Wound Width (cm) 4 5 cm 11/22/22 0825   Wound Depth (cm) 0 1 cm 11/22/22 0825   Wound Surface Area (cm^2) 0 45 cm^2 11/22/22 0825   Wound Volume (cm^3) 0 045 cm^3 11/22/22 0825   Calculated Wound Volume (cm^3) 0 05 cm^3 11/22/22 0825   Change in Wound Size % 68 75 11/22/22 0825   Drainage Amount Scant 11/22/22 0825   Drainage Description Milky; Serosanguineous 11/22/22 0825   Non-staged Wound Description Full thickness 11/22/22 0825   Dressing Status New drainage (no drsg upon arrival) 11/22/22 0825       Wound 08/23/22 Incision Thigh Left;Proximal;Medial (Active)   Wound Image Images linked 11/22/22 0829   Wound Description Granulation tissue;Pink;Epithelialization (rolled edges) 11/22/22 0828   Sarika-wound Assessment Pink 11/22/22 0828   Wound Length (cm) 0 2 cm 11/22/22 0828   Wound Width (cm) 0 7 cm 11/22/22 0828   Wound Depth (cm) 0 2 cm 11/22/22 0828   Wound Surface Area (cm^2) 0 14 cm^2 11/22/22 0828   Wound Volume (cm^3) 0 028 cm^3 11/22/22 0828   Calculated Wound Volume (cm^3) 0 03 cm^3 11/22/22 0828   Change in Wound Size % 96 67 11/22/22 0828   Undermining 0 3 11/22/22 0828   Undermining is depth extending from 9-12:00 11/22/22 0828   Drainage Amount Scant 11/22/22 0828   Drainage Description Clear 11/22/22 0828   Dressing Status New drainage (upon arrival) 11/22/22 0828       /84   Pulse 73   Temp (!) 96 7 °F (35 9 °C) (Temporal)     Physical Exam  Vitals reviewed  Constitutional:       Appearance: He is obese  HENT:      Head: Normocephalic and atraumatic  Mouth/Throat:      Mouth: Mucous membranes are moist    Eyes:      Extraocular Movements: Extraocular movements intact  Pulmonary:      Effort: Pulmonary effort is normal    Skin:     Comments: Wounds as below   Neurological:      Mental Status: He is alert  Wound Instructions:  Orders Placed This Encounter   Procedures   • Wound cleansing and dressings     Lower abdominal wound:        Apply warm compresses to the abdominal wound 1- 2x/day until compress cools down  Wash your hands with soap and water  Remove old dressing, discard into plastic bag and place in trash  Cleanse the wound with soap and water prior to applying a clean dressing  Do not use tissue or cotton balls  Do not scrub the wound   Pat dry using gauze  Shower yes - with wounds covered  Apply Clindamycin ointment to the wounds  Cover with adaptic and gauze   Secure with medipore tape  Change dressing daily      This was done today     Standing Status:   Future     Standing Expiration Date:   11/22/2023   • Wound cleansing and dressings     Left Leg incision     Wash your hands with soap and water  Remove old dressing, discard into plastic bag and place in trash  Cleanse the wound with normal saline prior to applying a clean dressing  Do not use tissue or cotton balls  Do not scrub the wound  Pat dry using gauze  Shower yes - with wound covered  Apply skin prep to skin surrounding wound  Apply Woun'dres to wound  Cover with border gauze      Change dressing daily     This was done today           Standing Status:   Future     Standing Expiration Date:   11/22/2023   • Wound compression and edema control     Continue using your lymphedema pump 1-2 times per day                     Standing Status:   Future     Standing Expiration Date:   11/22/2023   • Wound miscellaneous orders     Contact [facility] to set up appointment     Standing Status:   Future     Standing Expiration Date:   11/22/2023   • Wound cleansing and dressings     Left Upper Left Leg wound      Wash your hands with soap and water  Remove old dressing, discard into plastic bag and place in trash  Cleanse the wound with soap and water (cleaned with Prophase today)prior to applying a clean dressing  Do not use tissue or cotton balls  Do not scrub the wound  Pat dry using gauze  Shower yes - with wounds covered  Apply skin prep to skin surrounding wound  Apply Calcium alginate with silver to the leg/groin wound  Cover with ABD  Secure with Medipore tape    Change dressing daily            This was done today         Standing Status:   Future     Standing Expiration Date:   11/22/2023   • Debridement     This order was created via procedure documentation Diagnosis ICD-10-CM Associated Orders   1  Non-healing surgical wound, initial encounter  T81 89XA lidocaine (XYLOCAINE) 4 % topical solution 5 mL     Wound cleansing and dressings     Wound cleansing and dressings     Wound compression and edema control     Wound miscellaneous orders     Wound cleansing and dressings      2  Type 2 diabetes mellitus with diabetic polyneuropathy, without long-term current use of insulin ([facility])  E11 42 lidocaine (XYLOCAINE) 4 % topical solution 5 mL     Wound cleansing and dressings     Wound cleansing and dressings     Wound compression and edema control     Wound miscellaneous orders     Wound cleansing and dressings      3  History of hidradenitis suppurativa  L73 2 lidocaine (XYLOCAINE) 4 % topical solution 5 mL     Wound cleansing and dressings     Wound cleansing and dressings     Wound compression and edema control     Wound miscellaneous orders     Wound cleansing and dressings      4  Morbid obesity with body mass index (BMI) of 50 0 to 59 9 in adult ([facility])  E66 01 lidocaine (XYLOCAINE) 4 % topical solution 5 mL    Z68 43 Wound cleansing and dressings     Wound cleansing and dressings     Wound compression and edema control     Wound miscellaneous orders     Wound cleansing and dressings      5  Abscess of skin of abdomen  L02 211 lidocaine (XYLOCAINE) 4 % topical solution 5 mL     Wound cleansing and dressings     Wound cleansing and dressings     Wound compression and edema control     Wound miscellaneous orders     Wound cleansing and dressings      6  Abscess of left groin  L02 214 lidocaine (XYLOCAINE) 4 % topical solution 5 mL     Wound cleansing and dressings     Wound cleansing and dressings     Wound compression and edema control     Wound miscellaneous orders     Wound cleansing and dressings     Debridement      7   Noncompliance with treatment  Z91 199 lidocaine (XYLOCAINE) 4 % topical solution 5 mL     Wound cleansing and dressings     Wound cleansing and dressings Wound compression and edema control     Wound miscellaneous orders     Wound cleansing and dressings

## 2022-12-13 ENCOUNTER — OFFICE VISIT (OUTPATIENT)
Dept: WOUND CARE | Facility: HOSPITAL | Age: 42
End: 2022-12-13

## 2022-12-13 ENCOUNTER — OFFICE VISIT (OUTPATIENT)
Dept: FAMILY MEDICINE CLINIC | Facility: CLINIC | Age: 42
End: 2022-12-13

## 2022-12-13 VITALS
BODY MASS INDEX: 49.44 KG/M2 | OXYGEN SATURATION: 92 % | WEIGHT: 315 LBS | TEMPERATURE: 97.7 F | HEART RATE: 77 BPM | DIASTOLIC BLOOD PRESSURE: 82 MMHG | RESPIRATION RATE: 18 BRPM | SYSTOLIC BLOOD PRESSURE: 140 MMHG | HEIGHT: 67 IN

## 2022-12-13 VITALS
SYSTOLIC BLOOD PRESSURE: 136 MMHG | HEART RATE: 66 BPM | RESPIRATION RATE: 18 BRPM | DIASTOLIC BLOOD PRESSURE: 82 MMHG | TEMPERATURE: 97.7 F

## 2022-12-13 DIAGNOSIS — I89.0 LYMPHEDEMA OF LEFT LOWER EXTREMITY: ICD-10-CM

## 2022-12-13 DIAGNOSIS — L02.211 ABSCESS OF SKIN OF ABDOMEN: ICD-10-CM

## 2022-12-13 DIAGNOSIS — Z91.199 NONCOMPLIANCE WITH TREATMENT: ICD-10-CM

## 2022-12-13 DIAGNOSIS — E11.65 TYPE 2 DIABETES MELLITUS WITH HYPERGLYCEMIA, WITHOUT LONG-TERM CURRENT USE OF INSULIN (HCC): ICD-10-CM

## 2022-12-13 DIAGNOSIS — T78.40XA ALLERGIC REACTION, INITIAL ENCOUNTER: Primary | ICD-10-CM

## 2022-12-13 DIAGNOSIS — E11.42 TYPE 2 DIABETES MELLITUS WITH DIABETIC POLYNEUROPATHY, WITHOUT LONG-TERM CURRENT USE OF INSULIN (HCC): Primary | ICD-10-CM

## 2022-12-13 DIAGNOSIS — E66.01 MORBID OBESITY WITH BODY MASS INDEX (BMI) OF 50.0 TO 59.9 IN ADULT (HCC): ICD-10-CM

## 2022-12-13 DIAGNOSIS — L73.2 HIDRADENITIS SUPPURATIVA: ICD-10-CM

## 2022-12-13 DIAGNOSIS — T81.89XA NON-HEALING SURGICAL WOUND, INITIAL ENCOUNTER: ICD-10-CM

## 2022-12-13 DIAGNOSIS — L02.214 ABSCESS OF LEFT GROIN: ICD-10-CM

## 2022-12-13 RX ORDER — CETIRIZINE HYDROCHLORIDE 10 MG/1
10 TABLET ORAL DAILY
Qty: 30 TABLET | Refills: 1 | Status: SHIPPED | OUTPATIENT
Start: 2022-12-13

## 2022-12-13 RX ORDER — LIDOCAINE HYDROCHLORIDE 40 MG/ML
5 SOLUTION TOPICAL ONCE
Status: COMPLETED | OUTPATIENT
Start: 2022-12-13 | End: 2022-12-13

## 2022-12-13 RX ORDER — EPINEPHRINE 0.3 MG/.3ML
0.3 INJECTION SUBCUTANEOUS ONCE
Qty: 0.6 ML | Refills: 0 | Status: SHIPPED | OUTPATIENT
Start: 2022-12-13 | End: 2022-12-13

## 2022-12-13 RX ADMIN — LIDOCAINE HYDROCHLORIDE 5 ML: 40 SOLUTION TOPICAL at 09:20

## 2022-12-13 NOTE — LETTER
December 13, 2022     Patient: Robert Carroll  YOB: 1980  Date of Visit: 12/13/2022      To Whom it May Concern:    Omer Koch is under my professional care  Enrriquecalvin Addison was seen in my office on 12/13/2022  Enrrique Addison may return to work on 12/14/22  If you have any questions or concerns, please don't hesitate to call           Sincerely,          PETRA Louis        CC: No Recipients

## 2022-12-13 NOTE — PROGRESS NOTES
[facility] Office visit    Assessment/Plan:     1  Allergic reaction, initial encounter  -     Ambulatory Referral to Allergy; Future  -Advised patient to avoid triggers at work  Prescribed epipen to be used in time of allergic reaction  Advised patient that he could come to office for explanation on how it is used  -     cetirizine (ZyrTEC) 10 mg tablet; Take 1 tablet (10 mg total) by mouth daily  -     EPINEPHrine (EPIPEN) 0 3 mg/0 3 mL SOAJ; Inject 0 3 mL (0 3 mg total) into a muscle once for 1 dose    2  Type 2 diabetes mellitus with hyperglycemia, without long-term current use of insulin ([facility])  -     metFORMIN (GLUCOPHAGE) 1000 MG tablet; Take 1 tablet (1,000 mg total) by mouth 2 (two) times a day with meals          No follow-ups on file  Subjective:   NELSON Mills is a 40 y o  male presents to discuss allergic reactions  He works as  and has been exposed to a certain oil  He notes that when this specific oil touches his face he develops a rash and at times angioedema  He takes benadryl when this happens and notes it does not really help  He denies his throat being itchy or sensation of closing up now  He only has an allergy to amoxicillin  He has a history of recurring cellulitis and notes that he would like his abdomen checked  Review of Systems   Constitutional: Positive for activity change  Negative for appetite change, chills, fatigue and fever  HENT: Negative for congestion, postnasal drip, rhinorrhea and trouble swallowing  Cardiovascular: Negative for chest pain, palpitations and leg swelling  Gastrointestinal: Negative for abdominal pain, constipation, diarrhea, nausea and vomiting  Musculoskeletal: Negative for myalgias  Skin: Negative for rash  Allergic/Immunologic: Negative for environmental allergies and food allergies  Neurological: Negative for light-headedness and headaches  Psychiatric/Behavioral: The patient is not nervous/anxious  Objective:     /82 (BP Location: Left arm, Patient Position: Sitting, Cuff Size: Large)   Pulse 77   Temp 97 7 °F (36 5 °C) (Tympanic)   Resp 18   Ht 5' 7" (1 702 m)   Wt (!) 181 kg (399 lb 9 6 oz)   SpO2 92%   BMI 62 59 kg/m²      Physical Exam  Constitutional:       Appearance: He is obese  HENT:      Head: Normocephalic and atraumatic  Nose: Nose normal  No congestion or rhinorrhea  Mouth/Throat:      Mouth: Mucous membranes are moist       Pharynx: No oropharyngeal exudate or posterior oropharyngeal erythema  Eyes:      Extraocular Movements: Extraocular movements intact  Conjunctiva/sclera: Conjunctivae normal       Pupils: Pupils are equal, round, and reactive to light  Cardiovascular:      Rate and Rhythm: Normal rate and regular rhythm  Pulses: Normal pulses  Heart sounds: Normal heart sounds  Pulmonary:      Effort: Pulmonary effort is normal       Breath sounds: Normal breath sounds  Abdominal:      General: Bowel sounds are normal  There is no distension  Palpations: Abdomen is soft  Tenderness: There is no abdominal tenderness  Neurological:      General: No focal deficit present  Mental Status: He is alert and oriented to person, place, and time  Psychiatric:         Mood and Affect: Mood normal          Behavior: Behavior normal          Thought Content:  Thought content normal          Judgment: Judgment normal           ** Please Note: This note has been constructed using a voice recognition system **     Elena Yoder MD  12/20/22  8:13 AM

## 2022-12-13 NOTE — PATIENT INSTRUCTIONS
Orders Placed This Encounter   Procedures    Wound cleansing and dressings     Wound cleansing and dressings      Lower abdominal wound:        Apply warm compresses to the abdominal wound 1- 2x/day until compress cools down  Wash your hands with soap and water  Remove old dressing, discard into plastic bag and place in trash  Cleanse the wound with soap and water prior to applying a clean dressing  Do not use tissue or cotton balls  Do not scrub the wound  Pat dry using gauze  Shower yes - with wounds covered  Apply Clindamycin ointment to the wounds  Cover with gauze and secure with medipore tape  Change dressing daily  This was done today        Left Leg incision:     Wash your hands with soap and water  Remove old dressing, discard into plastic bag and place in trash  Cleanse the wound with normal saline prior to applying a clean dressing  Do not use tissue or cotton balls  Do not scrub the wound  Pat dry using gauze  Shower yes - with wound covered  Apply skin prep to skin surrounding wound  Apply Woun'dres to wound  Cover with border gauze   Change dressing daily        Left Upper Left Leg wound:      Wash your hands with soap and water  Remove old dressing, discard into plastic bag and place in trash  Cleanse the wound with soap and water (cleaned with Prophase today)prior to applying a clean dressing  Do not use tissue or cotton balls  Do not scrub the wound  Pat dry using gauze  Shower yes - with wounds covered  Apply skin prep to skin surrounding wound  Apply Calcium alginate with silver to the leg/groin wound  Cover with ABD  Secure with Medipore tape  Change dressing daily  Wound compression and edema control      Continue using your lymphedema pump 1-2 times per day       Wound miscellaneous orders      Contact [facility] to set up appointment     Standing Status:   Future     Standing Expiration Date:   12/13/2023

## 2022-12-13 NOTE — PROGRESS NOTES
Patient ID: Lisa Hale is a 40 y o  male Date of Birth 1980     Chief Complaint  Chief Complaint   Patient presents with   • Follow Up Wound Care Visit     Thigh, abdomen and leg wounds       Allergies  Amoxicillin    Assessment:     Diagnoses and all orders for this visit:    Type 2 diabetes mellitus with diabetic polyneuropathy, without long-term current use of insulin (HCC)  -     lidocaine (XYLOCAINE) 4 % topical solution 5 mL  -     Wound cleansing and dressings; Future    History of hidradenitis suppurativa  -     lidocaine (XYLOCAINE) 4 % topical solution 5 mL  -     Wound cleansing and dressings; Future    Morbid obesity with body mass index (BMI) of 50 0 to 59 9 in adult (HCC)  -     lidocaine (XYLOCAINE) 4 % topical solution 5 mL  -     Wound cleansing and dressings; Future    Non-healing surgical wound, initial encounter  -     lidocaine (XYLOCAINE) 4 % topical solution 5 mL  -     Wound cleansing and dressings; Future    Abscess of skin of abdomen  -     lidocaine (XYLOCAINE) 4 % topical solution 5 mL  -     Wound cleansing and dressings; Future    Abscess of left groin  -     lidocaine (XYLOCAINE) 4 % topical solution 5 mL  -     Wound cleansing and dressings; Future    Noncompliance with treatment  -     lidocaine (XYLOCAINE) 4 % topical solution 5 mL  -     Wound cleansing and dressings; Future    Lymphedema of left lower extremity  -     lidocaine (XYLOCAINE) 4 % topical solution 5 mL  -     Wound cleansing and dressings; Future    Other orders  -     Debridement              Debridement   Wound 03/07/22 Surgical Leg Left;Upper;Medial    Universal Protocol:  Consent: Written consent obtained  Consent given by: patient  Time out: Immediately prior to procedure a "time out" was called to verify the correct patient, procedure, equipment, support staff and site/side marked as required    Timeout called at: 12/13/2022 10:25 AM   Patient understanding: patient states understanding of the procedure being performed  Patient consent: the patient's understanding of the procedure matches consent given  Procedure consent matches procedure scheduled: N/A  Relevant documents present and verified: N/A  Test results available and properly labeled: N/A  Site marked: the operative site was marked  Imaging studies available: N/A  Required blood products, implants, devices, and special equipment available: N/A  Patient identity confirmed: verbally with patient      Performed by: NP  Debridement type: selective  Pain control: lidocaine 4%  Pre-debridement measurements  Length (cm): 3 2  Width (cm): 12 3  Depth (cm): 0 2  Surface Area (cm^2): 39 36  Volume (cm^3): 7 87    Post-debridement measurements  Length (cm): 3 2  Width (cm): 12 3  Depth (cm): 0 2  Percent debrided: 100%  Surface Area (cm^2): 39 36  Area debrided (cm^2): 39 36  Volume (cm^3): 7 87  Devitalized tissue debrided: biofilm, fibrin and slough  Instrument(s) utilized: curette  Bleeding: small  Hemostasis obtained with: pressure  Procedural pain (0-10): 0  Post-procedural pain: 0   Response to treatment: procedure was tolerated well          Plan:  1  F/U visit  Wound debrided  Wounds are essentially unchanged  Continue current plan of care  2   Patient still has not rescheduled appointment with dermatology  Counseled patient he needs to be seen by dermatology to help manage his hidradenitis  3   Counseled patient on the importance of tight glycemic control to aid in wound healing  4   Counseled patient again about being seen by general surgery for split thickness skin graft of his left medial thigh wound  Patient states he is unable to take a leave of absence of work at this time  Patient states he will possibly consider in the future  5   Patient will follow up in 4 weeks       Wound 03/07/22 Surgical Leg Left;Upper;Medial (Active)   Wound Image Images linked 12/13/22 0925   Wound Description Pink;Granulation tissue; Yellow;Slough; Epithelialization 12/13/22 0914   Sarika-wound Assessment Scar Tissue; Maceration; Induration 12/13/22 0914   Wound Length (cm) 3 2 cm 12/13/22 0914   Wound Width (cm) 12 3 cm 12/13/22 0914   Wound Depth (cm) 0 2 cm 12/13/22 0914   Wound Surface Area (cm^2) 39 36 cm^2 12/13/22 0914   Wound Volume (cm^3) 7 872 cm^3 12/13/22 0914   Calculated Wound Volume (cm^3) 7 87 cm^3 12/13/22 0914   Change in Wound Size % -33 84 12/13/22 0914   Undermining 0 3 12/13/22 0914   Undermining is depth extending from 7-9:00 12/13/22 0914   Drainage Amount Large 12/13/22 0914   Drainage Description Serosanguineous 12/13/22 0914   Non-staged Wound Description Full thickness 12/13/22 0914       Wound 04/11/22 Incision Abdomen Left;Medial;Lower (Active)   Wound Image Images linked 12/13/22 0906   Wound Description Granulation tissue; Epithelialization;Pink 12/13/22 0904   Sarika-wound Assessment Pink;Scar Tissue; Induration 12/13/22 0904   Wound Length (cm) 1 cm 12/13/22 0904   Wound Width (cm) 3 cm 12/13/22 0904   Wound Depth (cm) 0 1 cm 12/13/22 0904   Wound Surface Area (cm^2) 3 cm^2 12/13/22 0904   Wound Volume (cm^3) 0 3 cm^3 12/13/22 0904   Calculated Wound Volume (cm^3) 0 3 cm^3 12/13/22 0904   Change in Wound Size % -87 5 12/13/22 0904   Drainage Amount Scant 12/13/22 0904   Drainage Description Serous 12/13/22 0904   Non-staged Wound Description Full thickness 12/13/22 0904       Wound 08/23/22 Incision Thigh Left;Proximal;Medial (Active)   Wound Image Images linked 12/13/22 0909   Wound Description Granulation tissue;Pink;Epithelialization 12/13/22 0908   Sarika-wound Assessment Pink 12/13/22 0908   Wound Length (cm) 0 2 cm 12/13/22 0908   Wound Width (cm) 0 5 cm 12/13/22 0908   Wound Depth (cm) 0 5 cm 12/13/22 0908   Wound Surface Area (cm^2) 0 1 cm^2 12/13/22 0908   Wound Volume (cm^3) 0 05 cm^3 12/13/22 0908   Calculated Wound Volume (cm^3) 0 05 cm^3 12/13/22 0908   Change in Wound Size % 94 44 12/13/22 0908 Drainage Amount Scant 12/13/22 0908   Drainage Description Clear 12/13/22 0908   Non-staged Wound Description Full thickness 12/13/22 0908       Wound 03/07/22 Surgical Leg Left;Upper;Medial (Active)   Date First Assessed/Time First Assessed: 03/07/22 0833   Primary Wound Type: Surgical  Location: Leg  Wound Location Orientation: Left;Upper;Medial  Wound Outcome: (c)        Wound 04/11/22 Incision Abdomen Left;Medial;Lower (Active)   Date First Assessed: 04/11/22   Primary Wound Type: Incision  Location: Abdomen  Wound Location Orientation: Left;Medial;Lower       Wound 08/23/22 Incision Thigh Left;Proximal;Medial (Active)   Date First Assessed: 08/23/22   Primary Wound Type: Incision  Location: Thigh  Wound Location Orientation: Left;Proximal;Medial       [REMOVED] Wound 05/26/21 Incision Thigh Left (Removed)   Resolved Date: 03/07/22  Date First Assessed/Time First Assessed: 05/26/21 1914   Pre-Existing Wound: Yes  Primary Wound Type: Incision  Location: Thigh  Wound Location Orientation: Left  Wound Description (Comments): 2 incisions  Incision's 1st Dress    [REMOVED] Wound 05/26/21 Surgical Other (Comment) Groin Left (Removed)   Resolved Date: 03/07/22  Date First Assessed/Time First Assessed: 05/26/21 1916   Pre-Existing Wound: Yes  Primary Wound Type: Surgical  Traumatic Wound Type: (c) Other (Comment)  Location: Groin  Wound Location Orientation: Left  Wound Outcome: Unkno    [REMOVED] Wound 06/01/21 Other (comment) Cellulitis Pretibial Left (Removed)   Resolved Date: 03/07/22  Date First Assessed/Time First Assessed: 06/01/21 1930   Pre-Existing Wound: Yes  Primary Wound Type: Other (comment)  Traumatic Wound Type: Cellulitis  Location: Pretibial  Wound Location Orientation: Left  Dressing Status: C         [REMOVED] Wound 06/02/21 Hip Anterior;Right (Removed)   Resolved Date: 03/07/22  Date First Assessed/Time First Assessed: 06/02/21 1028   Location: Hip  Wound Location Orientation: Anterior;Right  Wound Outcome: Unknown (No longer present)       [REMOVED] Wound 06/02/21 Sacrum (Removed)   Resolved Date: 03/07/22  Date First Assessed/Time First Assessed: 06/02/21 1034   Location: Sacrum  Wound Outcome: Unknown (No longer present)       [REMOVED] Wound 07/06/21 Leg Left (Removed)   Resolved Date: 03/07/22  Date First Assessed/Time First Assessed: 07/06/21 1639   Location: Leg  Wound Location Orientation: Left  Incision's 1st Dressing: KERLIX SUPER SPONGE 6 IN (x2), ACE WRAP 6 IN UNSTERILE (x4), SPONGE GAUZE 4 X 4 16 PLY STRL FÁTIMA    [REMOVED] Wound 03/30/22 Sacrum (Removed)   Resolved Date: 04/11/22  Date First Assessed/Time First Assessed: 03/30/22 1610   Location: Sacrum  Wound Description (Comments): 4/1/22 Stage 2 POA  Wound Outcome: (c) Other (Comment)       [REMOVED] Wound 03/30/22 Surgical Thigh Anterior; Left (Removed)   Resolved Date: 04/11/22  Date First Assessed/Time First Assessed: 03/30/22 1614   Pre-Existing Wound: Yes  Primary Wound Type: Surgical  Location: Thigh  Wound Location Orientation: Anterior; Left  Wound Description (Comments): Surgical Wound  Dressing    [REMOVED] Wound 03/30/22 Abdomen Left (Removed)   Resolved Date: 04/11/22  Date First Assessed/Time First Assessed: 03/30/22 1616   Location: Abdomen  Wound Location Orientation: Left  Wound Outcome: (c) Other (Comment)       [REMOVED] Wound 03/30/22 Thigh Anterior;Right (Removed)   Resolved Date: 04/11/22  Date First Assessed/Time First Assessed: 03/30/22 1617   Location: Thigh  Wound Location Orientation: Anterior;Right  Wound Outcome: (c) Other (Comment)       [REMOVED] Wound 03/30/22 Abscess Cellulitis Thigh Anterior;Left;Proximal (Removed)   Resolved Date: 04/11/22  Date First Assessed/Time First Assessed: 03/30/22 1618   Primary Wound Type: Abscess  Traumatic Wound Type: Cellulitis  Location: Thigh  Wound Location Orientation: Anterior;Left;Proximal  Dressing Status: New drainage  Wound     [REMOVED] Wound 05/04/22 Leg Left; Inner; Lower (Removed)   Resolved Date: 06/21/22  Date First Assessed/Time First Assessed: 05/04/22 2200   Pre-Existing Wound: Yes  Location: Leg  Wound Location Orientation: Left; Inner; Lower  Wound Outcome: (c) Other (Comment)       [REMOVED] Wound 05/04/22 Other (comment) Sacrum (Removed)   Resolved Date: 06/21/22  Date First Assessed/Time First Assessed: 05/04/22 0001   Pre-Existing Wound: Yes  Primary Wound Type: Other (comment)  Location: Sacrum  Wound Description (Comments): Pt states wound is closed/healed & declined assessment 6/21    Subjective:     F/U visit for multiple wounds  No new complaints  Patient reports he still has not made a follow-up appointment with dermatology yet  He denies any pain, fevers, or chills        The following portions of the patient's history were reviewed and updated as appropriate:   He  has a past medical history of Arthritis, Breathing difficulty, Coronary artery disease, Diabetes mellitus ([facility] [address] ), Diabetic neuropathy ([facility] [address] ) (5/28/2021), Heart disease, Hidradenitis suppurativa, History of transfusion, Hyperlipidemia, Hypertension, MI (myocardial infarction) ([facility] [address] ), Morbid obesity with BMI of 50 0-59 9, adult ([facility] [address] ), Nicotine dependence, Obesity, and Pilonidal cyst   He   Patient Active Problem List    Diagnosis Date Noted   • Lymphedema of both lower extremities 06/15/2022   • History of coronary angioplasty with insertion of stent 06/13/2022   • Colitis 05/04/2022   • Abscess of multiple sites of buttock 05/04/2022   • Anemia 04/01/2022   • Swelling of left lower extremity 03/30/2022   • History of hidradenitis suppurativa 03/30/2022   • Nocturnal hypoxia 12/04/2021   • Cellulitis of multiple sites of trunk 12/02/2021   • Chest pain 12/02/2021   • Cellulitis of left lower extremity 06/23/2021   • H/O drainage of abscess 06/21/2021   • Difficult intubation 05/28/2021   • Morbid obesity with body mass index (BMI) of 50 0 to 59 9 in adult ([place] [address] ) 03/10/2021   • Type 1 non-ST elevation myocardial infarction (NSTEMI) ([place] [id number] ) 11/29/2020   • Left groin mass 11/29/2020   • Type 2 diabetes mellitus with diabetic polyneuropathy, without long-term current use of insulin ([place] [address] ) 07/26/2018   • Dyslipidemia 01/23/2017   • Pilonidal cyst 12/13/2016   • Essential hypertension 01/14/2016   • Abnormal liver enzymes 09/23/2014   • Coronary artery disease 06/25/2014     He  has a past surgical history that includes Leg Surgery (Left); Coronary angioplasty with stent; Incision and drainage of wound (N/A, 1/24/2017); pr exc b9 lesion mrgn xcp sk tg t/a/l >4 0 cm (Left, 4/6/2021); pr negative pressure wound therapy dme </= 50 sq cm (Left, 4/6/2021); Wound debridement (Left, 4/17/2021); Wound debridement (Left, 4/22/2021); Wound debridement (Left, 5/26/2021); Wound debridement (Left, 5/28/2021); pr debridement subcutaneous tissue 20 sq cm/< (Left, 7/6/2021); pr excision h/p/p/u complex repair (Left, 7/6/2021); and Cardiac surgery  His family history includes Diabetes in his father; Heart disease in his father and mother; Hypertension in his mother  He  reports that he quit smoking about 21 months ago  His smoking use included cigarettes  He started smoking about 1 years ago  He has a 30 00 pack-year smoking history  He has never used smokeless tobacco  He reports that he does not currently use alcohol  He reports that he does not use drugs    Current Outpatient Medications   Medication Sig Dispense Refill   • albuterol (ProAir HFA) 90 mcg/act inhaler Inhale 2 puffs every 6 (six) hours as needed for wheezing 8 5 g 3   • amLODIPine (NORVASC) 10 mg tablet TAKE ONE TABLET BY MOUTH EVERY DAY 90 tablet 1   • aspirin (ECOTRIN LOW STRENGTH) 81 mg EC tablet Take 1 tablet (81 mg total) by mouth daily 30 tablet 0   • atorvastatin (LIPITOR) 80 mg tablet Take 1 tablet (80 mg total) by mouth daily 90 tablet 1   • carvedilol (COREG) 6 25 mg tablet Take 1 tablet (6 25 mg total) by mouth 2 (two) times a day with meals 180 tablet 1   • clindamycin (CLINDAGEL) 1 % gel Apply topically 2 (two) times a day 30 g 0   • Insulin Pen Needle (PEN NEEDLES 29GX1/2") 29G X 12MM MISC Use 1 needle daily for subcutaneous injections (Patient not taking: No sig reported) 50 each 3   • liraglutide (VICTOZA) injection Inject 0 1 mL (0 6 mg total) under the skin daily Start at 0 6 mg daily for a week, then increase to 1 2 mg daily on the second week, then increase to 1 8 mg daily on the third week and going forward  3 mL 3   • metFORMIN (GLUCOPHAGE) 1000 MG tablet Take 1 tablet (1,000 mg total) by mouth 2 (two) times a day with meals 180 tablet 1   • mupirocin (BACTROBAN) 2 % ointment Apply topically daily 22 g 2   • Omega-3 Fatty Acids (fish oil) 1,000 mg Take 1 capsule (1,000 mg total) by mouth 2 (two) times a day 180 capsule 2   • oxyCODONE-acetaminophen (PERCOCET) 5-325 mg per tablet      • ticagrelor (Brilinta) 90 MG Take 1 tablet (90 mg total) by mouth every 12 (twelve) hours (Patient not taking: No sig reported) 180 tablet 2   • zinc sulfate (ZINCATE) 220 mg capsule Take 1 capsule (220 mg total) by mouth daily 30 capsule 0     No current facility-administered medications for this visit  He is allergic to amoxicillin       Review of Systems   Constitutional: Negative  HENT: Negative for ear pain and hearing loss  Eyes: Negative for pain  Respiratory: Negative for chest tightness and shortness of breath  Cardiovascular: Positive for leg swelling  Negative for chest pain and palpitations  Gastrointestinal: Negative for diarrhea, nausea and vomiting  Genitourinary: Negative for dysuria  Musculoskeletal: Negative for gait problem  Skin: Positive for wound  Neurological: Negative for tremors and weakness  Psychiatric/Behavioral: Negative for behavioral problems, confusion and suicidal ideas           Objective:       Wound 03/07/22 Surgical Leg Left;Upper;Medial (Active)   Wound Image Images linked 12/13/22 0914   Wound Description Pink;Granulation tissue; Yellow;Slough; Epithelialization 12/13/22 0914   Sarika-wound Assessment Scar Tissue; Maceration; Induration 12/13/22 0914   Wound Length (cm) 3 2 cm 12/13/22 0914   Wound Width (cm) 12 3 cm 12/13/22 0914   Wound Depth (cm) 0 2 cm 12/13/22 0914   Wound Surface Area (cm^2) 39 36 cm^2 12/13/22 0914   Wound Volume (cm^3) 7 872 cm^3 12/13/22 0914   Calculated Wound Volume (cm^3) 7 87 cm^3 12/13/22 0914   Change in Wound Size % -33 84 12/13/22 0914   Undermining 0 3 12/13/22 0914   Undermining is depth extending from 7-9:00 12/13/22 0914   Drainage Amount Large 12/13/22 0914   Drainage Description Serosanguineous 12/13/22 0914   Non-staged Wound Description Full thickness 12/13/22 0914       Wound 04/11/22 Incision Abdomen Left;Medial;Lower (Active)   Wound Image Images linked 12/13/22 0906   Wound Description Granulation tissue; Epithelialization;Pink 12/13/22 0904   Sarika-wound Assessment Pink;Scar Tissue; Induration 12/13/22 0904   Wound Length (cm) 1 cm 12/13/22 0904   Wound Width (cm) 3 cm 12/13/22 0904   Wound Depth (cm) 0 1 cm 12/13/22 0904   Wound Surface Area (cm^2) 3 cm^2 12/13/22 0904   Wound Volume (cm^3) 0 3 cm^3 12/13/22 0904   Calculated Wound Volume (cm^3) 0 3 cm^3 12/13/22 0904   Change in Wound Size % -87 5 12/13/22 0904   Drainage Amount Scant 12/13/22 0904   Drainage Description Serous 12/13/22 0904   Non-staged Wound Description Full thickness 12/13/22 0904       Wound 08/23/22 Incision Thigh Left;Proximal;Medial (Active)   Wound Image Images linked 12/13/22 0909   Wound Description Granulation tissue;Pink;Epithelialization 12/13/22 0908   Sarika-wound Assessment Pink 12/13/22 0908   Wound Length (cm) 0 2 cm 12/13/22 0908   Wound Width (cm) 0 5 cm 12/13/22 0908   Wound Depth (cm) 0 5 cm 12/13/22 0908   Wound Surface Area (cm^2) 0 1 cm^2 12/13/22 0908   Wound Volume (cm^3) 0 05 cm^3 12/13/22 0908   Calculated Wound Volume (cm^3) 0 05 cm^3 12/13/22 4337   Change in Wound Size % 94 44 12/13/22 0908   Drainage Amount Scant 12/13/22 0908   Drainage Description Clear 12/13/22 0908   Non-staged Wound Description Full thickness 12/13/22 0908       /82   Pulse 66   Temp 97 7 °F (36 5 °C)   Resp 18                     Wound Instructions:  Orders Placed This Encounter   Procedures   • Wound cleansing and dressings     Wound cleansing and dressings      Lower abdominal wound:        Apply warm compresses to the abdominal wound 1- 2x/day until compress cools down  Wash your hands with soap and water  Remove old dressing, discard into plastic bag and place in trash  Cleanse the wound with soap and water prior to applying a clean dressing  Do not use tissue or cotton balls  Do not scrub the wound  Pat dry using gauze  Shower yes - with wounds covered  Apply Clindamycin ointment to the wounds  Cover with gauze and secure with medipore tape  Change dressing daily    This was done today        Left Leg incision:     Wash your hands with soap and water  Remove old dressing, discard into plastic bag and place in trash  Cleanse the wound with normal saline prior to applying a clean dressing  Do not use tissue or cotton balls  Do not scrub the wound  Pat dry using gauze  Shower yes - with wound covered  Apply skin prep to skin surrounding wound  Apply Woun'dres to wound  Cover with border gauze   Change dressing daily        Left Upper Left Leg wound:      Wash your hands with soap and water  Remove old dressing, discard into plastic bag and place in trash  Cleanse the wound with soap and water (cleaned with Prophase today)prior to applying a clean dressing  Do not use tissue or cotton balls  Do not scrub the wound  Pat dry using gauze  Shower yes - with wounds covered  Apply skin prep to skin surrounding wound  Apply Calcium alginate with silver to the leg/groin wound  Cover with ABD  Secure with Medipore tape  Change dressing daily        • Wound compression and edema control      Continue using your lymphedema pump 1-2 times per day  • Wound miscellaneous orders      Contact [facility] to set up appointment     Standing Status:   Future     Standing Expiration Date:   12/13/2023   • Debridement     This order was created via procedure documentation        Diagnosis ICD-10-CM Associated Orders   1  Type 2 diabetes mellitus with diabetic polyneuropathy, without long-term current use of insulin ([facility])  E11 42 lidocaine (XYLOCAINE) 4 % topical solution 5 mL     Wound cleansing and dressings      2  History of hidradenitis suppurativa  L73 2 lidocaine (XYLOCAINE) 4 % topical solution 5 mL     Wound cleansing and dressings      3  Morbid obesity with body mass index (BMI) of 50 0 to 59 9 in adult ([facility])  E66 01 lidocaine (XYLOCAINE) 4 % topical solution 5 mL    Z68 43 Wound cleansing and dressings      4  Non-healing surgical wound, initial encounter  T81 89XA lidocaine (XYLOCAINE) 4 % topical solution 5 mL     Wound cleansing and dressings      5  Abscess of skin of abdomen  L02 211 lidocaine (XYLOCAINE) 4 % topical solution 5 mL     Wound cleansing and dressings      6  Abscess of left groin  L02 214 lidocaine (XYLOCAINE) 4 % topical solution 5 mL     Wound cleansing and dressings      7  Noncompliance with treatment  Z91 199 lidocaine (XYLOCAINE) 4 % topical solution 5 mL     Wound cleansing and dressings      8   Lymphedema of left lower extremity  I89 0 lidocaine (XYLOCAINE) 4 % topical solution 5 mL     Wound cleansing and dressings

## 2023-01-10 ENCOUNTER — OFFICE VISIT (OUTPATIENT)
Dept: WOUND CARE | Facility: HOSPITAL | Age: 43
End: 2023-01-10

## 2023-01-10 ENCOUNTER — CONSULT (OUTPATIENT)
Dept: DERMATOLOGY | Facility: CLINIC | Age: 43
End: 2023-01-10

## 2023-01-10 ENCOUNTER — TELEPHONE (OUTPATIENT)
Dept: DERMATOLOGY | Facility: CLINIC | Age: 43
End: 2023-01-10

## 2023-01-10 VITALS — BODY MASS INDEX: 49.44 KG/M2 | HEIGHT: 67 IN | WEIGHT: 315 LBS

## 2023-01-10 VITALS
HEART RATE: 75 BPM | DIASTOLIC BLOOD PRESSURE: 81 MMHG | SYSTOLIC BLOOD PRESSURE: 130 MMHG | TEMPERATURE: 97.9 F | RESPIRATION RATE: 18 BRPM

## 2023-01-10 DIAGNOSIS — Z91.199 NONCOMPLIANCE WITH TREATMENT: ICD-10-CM

## 2023-01-10 DIAGNOSIS — E66.01 MORBID OBESITY WITH BODY MASS INDEX (BMI) OF 50.0 TO 59.9 IN ADULT (HCC): ICD-10-CM

## 2023-01-10 DIAGNOSIS — L02.214 ABSCESS OF LEFT GROIN: ICD-10-CM

## 2023-01-10 DIAGNOSIS — L73.2 HIDRADENITIS SUPPURATIVA: ICD-10-CM

## 2023-01-10 DIAGNOSIS — I89.0 LYMPHEDEMA OF LEFT LOWER EXTREMITY: ICD-10-CM

## 2023-01-10 DIAGNOSIS — L03.311 CELLULITIS OF ABDOMINAL WALL: ICD-10-CM

## 2023-01-10 DIAGNOSIS — L73.2 HIDRADENITIS SUPPURATIVA: Primary | ICD-10-CM

## 2023-01-10 DIAGNOSIS — E11.42 TYPE 2 DIABETES MELLITUS WITH DIABETIC POLYNEUROPATHY, WITHOUT LONG-TERM CURRENT USE OF INSULIN (HCC): Primary | ICD-10-CM

## 2023-01-10 DIAGNOSIS — T81.89XA NON-HEALING SURGICAL WOUND, INITIAL ENCOUNTER: ICD-10-CM

## 2023-01-10 DIAGNOSIS — L02.211 ABSCESS OF SKIN OF ABDOMEN: ICD-10-CM

## 2023-01-10 RX ORDER — CLINDAMYCIN PHOSPHATE 10 UG/ML
LOTION TOPICAL 2 TIMES DAILY
Qty: 60 ML | Refills: 0 | Status: SHIPPED | OUTPATIENT
Start: 2023-01-10

## 2023-01-10 RX ORDER — LIDOCAINE HYDROCHLORIDE 40 MG/ML
5 SOLUTION TOPICAL ONCE
Status: COMPLETED | OUTPATIENT
Start: 2023-01-10 | End: 2023-01-10

## 2023-01-10 RX ORDER — ZINC SULFATE 50(220)MG
220 CAPSULE ORAL DAILY
Qty: 30 CAPSULE | Refills: 0 | Status: SHIPPED | OUTPATIENT
Start: 2023-01-10 | End: 2023-02-09

## 2023-01-10 RX ORDER — DOXYCYCLINE 100 MG/1
CAPSULE ORAL
Qty: 60 CAPSULE | Refills: 1 | Status: SHIPPED | OUTPATIENT
Start: 2023-01-10 | End: 2023-04-10

## 2023-01-10 RX ADMIN — LIDOCAINE HYDROCHLORIDE 5 ML: 40 SOLUTION TOPICAL at 09:12

## 2023-01-10 NOTE — TELEPHONE ENCOUNTER
Prior authorization for Humira was faxed to insurance along with recent clinical notes and demographics and marked as urgent  Form was scanned into patients chart

## 2023-01-10 NOTE — PROGRESS NOTES
[facility] Note     Patient Name: Major Li  Encounter Date: 01/10/2023     Have you been cared for by a [facility] Dermatologist in the last 3 years and, if so, which description applies to you? NO  I am considered a "new" patient and must complete all patient intake questions  I am MALE/not capable of bearing children  REVIEW OF SYSTEMS:  Have you recently had or currently have any of the following? · Recent fever or chills? No  · Any non-healing wound? YES, Spot of concern, open for 2 years constantly draining fluid    PAST MEDICAL HISTORY:  Have you personally ever had or currently have any of the following? If "YES," then please provide more detail  · Skin cancer (such as Melanoma, Basal Cell Carcinoma, Squamous Cell Carcinoma? No  · Tuberculosis, HIV/AIDS, Hepatitis B or C: No  · Systemic Immunosuppression such as Diabetes, Biologic or Immunotherapy, Chemotherapy, Organ Transplantation, Bone Marrow Transplantation No  · Radiation Treatment No   FAMILY HISTORY:  Any "first degree relatives" (parent, brother, sister, or child) with the following? • Skin Cancer, Pancreatic or Other Cancer? No   PATIENT EXPERIENCE:    • Do you want the Dermatologist to perform a COMPLETE skin exam today including a clinical examination under the "bra and underwear" areas? NO  • If necessary, do we have your permission to call and leave a detailed message on your Preferred Phone number that includes your specific medical information?   Yes      Allergies   Allergen Reactions   • Amoxicillin Hives     childhood      Current Outpatient Medications:   •  amLODIPine (NORVASC) 10 mg tablet, TAKE ONE TABLET BY MOUTH EVERY DAY, Disp: 90 tablet, Rfl: 1  •  aspirin (ECOTRIN LOW STRENGTH) 81 mg EC tablet, Take 1 tablet (81 mg total) by mouth daily, Disp: 30 tablet, Rfl: 0  •  atorvastatin (LIPITOR) 80 mg tablet, Take 1 tablet (80 mg total) by mouth daily, Disp: 90 tablet, Rfl: 1  •  carvedilol (COREG) 6 25 mg tablet, Take 1 tablet (6 25 mg total) by mouth 2 (two) times a day with meals, Disp: 180 tablet, Rfl: 1  •  cetirizine (ZyrTEC) 10 mg tablet, Take 1 tablet (10 mg total) by mouth daily, Disp: 30 tablet, Rfl: 1  •  clindamycin (CLINDAGEL) 1 % gel, Apply topically 2 (two) times a day, Disp: 30 g, Rfl: 0  •  EPINEPHrine (EPIPEN) 0 3 mg/0 3 mL SOAJ, Inject 0 3 mL (0 3 mg total) into a muscle once for 1 dose, Disp: 0 6 mL, Rfl: 0  •  liraglutide (VICTOZA) injection, Inject 0 1 mL (0 6 mg total) under the skin daily Start at 0 6 mg daily for a week, then increase to 1 2 mg daily on the second week, then increase to 1 8 mg daily on the third week and going forward , Disp: 3 mL, Rfl: 3  •  metFORMIN (GLUCOPHAGE) 1000 MG tablet, Take 1 tablet (1,000 mg total) by mouth 2 (two) times a day with meals, Disp: 180 tablet, Rfl: 1  •  Omega-3 Fatty Acids (fish oil) 1,000 mg, Take 1 capsule (1,000 mg total) by mouth 2 (two) times a day, Disp: 180 capsule, Rfl: 2  •  albuterol (ProAir HFA) 90 mcg/act inhaler, Inhale 2 puffs every 6 (six) hours as needed for wheezing (Patient not taking: Reported on 1/10/2023), Disp: 8 5 g, Rfl: 3  •  Insulin Pen Needle (PEN NEEDLES 29GX1/2") 29G X 12MM MISC, Use 1 needle daily for subcutaneous injections (Patient not taking: Reported on 5/4/2022), Disp: 50 each, Rfl: 3  •  mupirocin (BACTROBAN) 2 % ointment, Apply topically daily (Patient not taking: Reported on 1/10/2023), Disp: 22 g, Rfl: 2  •  oxyCODONE-acetaminophen (PERCOCET) 5-325 mg per tablet, , Disp: , Rfl:   •  ticagrelor (Brilinta) 90 MG, Take 1 tablet (90 mg total) by mouth every 12 (twelve) hours (Patient not taking: Reported on 5/2/2022), Disp: 180 tablet, Rfl: 2  •  zinc sulfate (ZINCATE) 220 mg capsule, Take 1 capsule (220 mg total) by mouth daily (Patient not taking: Reported on 1/10/2023), Disp: 30 capsule, Rfl: 0  No current facility-administered medications for this visit            • Whom besides the patient is providing clinical information about today's encounter?   o NO ADDITIONAL HISTORIAN (patient alone provided history)    Physical Exam and Assessment/Plan by Diagnosis:    HIDRADENITIS SUPPURATIVA    Physical Exam:  • Anatomic Location Affected:  Groin and lower abdomen   • Morphological Description:  Salas stage 3; Pockets grater then 4 cm   • Pertinent Positives:  • Pertinent Negatives: Additional History of Present Condition:  Pt states that he has had these abscesses in the groin and lower abdomen for the past 2 years  Pt has been seeing wound care  Pt states that the lesions do drain fluid and bleed occasionally  Assessment and Plan:  Based on a thorough discussion of this condition and the management approach to it (including a comprehensive discussion of the known risks, side effects and potential benefits of treatment), the patient (family) agrees to implement the following specific plan:  • Please start taking Doxycyline 100 mg 2 times daily with a full glass of water for the next 3 months  • Please start applying Clindamycin 1% lotion up to 2 times daily to the affected area  • Please re-start taking a Zinc supplement   • Discussed Humira Injection (Loading dose 60 sub Q start; 80 every other week subQ ); Will start PA   • Please get labs done   • I note the his HS is extensive and severe  Will initially treat with protracted oral  Antibiotic as we purse option of humira  I did review risk benefit concerns with humira  What is hidradenitis suppurativa? Hidradenitis suppurativa is an inflammatory skin disease that affects apocrine gland-bearing skin in the axillae, in the groin, and under the breasts  It is characterised by recurrent boil-like nodules and abscesses that culminate in pus-like discharge, difficult-to-heal open wounds (sinuses) and scarring  Hidradenitis suppurativa also has significant psychological impact and many patients suffer from impairment of body image, depression and anxiety  The term hidradenitis implies it starts as an inflammatory disorder of sweat glands, which is now known to be incorrect  Hidradenitis suppurativa is also known as acne inversa  Who gets hidradenitis suppurativa? Hidradenitis often starts at puberty, and is most active between the ages of 21 and 36 years, and in women, can resolve at menopause  It is three times more common in females than in males  Risk factors include:  • Other family members with hidradenitis suppurativa  • Obesity and insulin resistance/metabolic syndrome  • Cigarette smoking  • Follicular occlusion disorders: acne conglobata, dissecting cellulitis, pilonidal sinus  • Inflammatory bowel disease (Crohn disease)  • Rare autoinflammatory syndromes associated with abnormalities of PSTPIP1 gene  *    * PAPA syndrome (pyogenic arthritis, pyoderma gangrenosum and acne), PASH syndrome (pyoderma gangrenosum, acne, suppurative hidradenitis) and PAPASH syndrome (pyogenic arthritis, pyoderma gangrenosum, acne, suppurative hidradenitis)  What causes hidradenitis suppurativa? Hidradenitis suppurativa is an autoinflammatory disorder  Although the exact cause is not yet understood, contributing factors include:  • Friction from clothing and body folds  • Aberrant immune response to commensal bacteria  • Abnormal cutaneous or follicular microbiome  • Follicular occlusion  • Release of pro-inflammatory cytokines  • Inflammation causing rupture of the follicular wall and destroying apocrine glands and ducts  • Secondary bacterial infection  • Certain drugs  What are the clinical features of hidradenitis suppurativa? Hidradenitis can affect a single or multiple areas in the armpits, neck, sub mammary area, and inner thighs  Anogenital involvement most commonly affects the groin, mons pubis, vulva (in females), sides of the scrotum (in males), perineum, buttocks and perianal folds    Signs include:  • Open and closed comedones  • Painful firm papules, larger nodules and pleated ridges  • Pustules, fluctuant pseudocysts and abscesses  • Pyogenic granulomas  • Draining sinuses linking inflammatory lesions  • Hypertrophic and atrophic scars  Many patients with hidradenitis suppurativa also suffer from other skin disorders, including acne, hirsutism and psoriasis  The severity and extent of hidradenitis suppurativa is recorded at assessment and when determining the impact of a treatment  The [employer] system describes three distinct clinical stages:  1  Solitary or multiple, isolated abscess formation without scarring or sinus tracts  2  Recurrent abscesses, single or multiple widely  lesions, with sinus tract formation  3  Diffuse or broad involvement, with multiple interconnected sinus tracts and abscesses  Severe hidradenitis (Salas Stage 3) has been associated with:  • Male sex  • Axillary and perianal involvement  • Obesity  • Smoking  • Higher risk of stroke, coronary artery disease, heart failure, and peripheral artery disease  • Disease duration  What is the treatment for hidradenitis suppurativa? General measures  • Weight loss; follow an anti-inflammatory, low-sugar, low-grain, low-dairy diet (mainly plants)  • Smoking cessation: this can lead to improvement within a few months  • Loose fitting clothing  • Daily unfragranced antiperspirants  • If prone to secondary infection, wash with antiseptics or take bleach baths  • Apply hydrogen peroxide solution or medical grade honey to reduce malodour  • Use peeling agents such as resorcinol 15% cream to de-roof nodules  • Apply simple dressings to draining sinuses  • Analgesics, such as paracetamol (acetaminophen), for pain control  • Seek help to manage anxiety and depression  Medical management of hidradenitis suppurativa  Medical management of hidradenitis suppurativa is difficult  Treatment is required long term  Effective options are listed below      Antibiotics  • Topical clindamycin, with benzoyl peroxide to reduce bacterial resistance  • Short course of oral antibiotics for acute staphylococcal abscesses, eg flucloxacillin  • Prolonged courses (minimum 3 months) of tetracycline, metronidazole, trimethoprim + sulphamethoxazole, fluoroquinolones, ertapenem or dapsone for their anti-inflammatory action  • 6-12 week courses of the combination of clindamycin (or doxycycline) and rifampicin for severe disease  Antiandrogens  • Long-term oral contraceptive pill; antiandrogenic progesterones drospirenone or cyproterone acetate may be more effective than standard combined pills  These are more suitable than progesterone-only pills or devices  • Spironolactone and finasteride  • Response takes 6 months or longer  Immunomodulatory treatments for severe disease  • Intralesional corticosteroids into nodules  • Systemic corticosteroids short-term for flares  • Methotrexate, ciclosporin, and azathioprine  • TNF-? inhibitors adalimumab and infliximab, used in higher dose than required for psoriasis, are the most successful treatments to date  Note that paradoxically, they may sometimes induce new-onset hidradenitis suppurativa  • Other biologics are under investigation, such as the IL-1?  antagonist, canakinumab    Other medical treatments  • Metformin in patients with insulin resistance  • Acitretin (unsuitable for females of childbearing potential)  • Isotretinoin -- effective for acne but appears unhelpful for most cases of hidradenitis  • Colchicine  • Medical management of anxiety and depression    Surgical management of hidradenitis suppurativa  • Incision and drainage of acute abscesses  • Curettage and deroofing of nodules, abscesses and sinuses  • Laser ablation of nodules, abscesses and sinuses  • Wide local excision of persistent nodules  • Radical excisional surgery of entire affected area  • Nd:YAG laser hair removal    Scribe Attestation    I,:  Martin Joaquin am acting as a scribe while in the presence of the attending physician :       I,:  Amy Vargas MD personally performed the services described in this documentation    as scribed in my presence :

## 2023-01-10 NOTE — PATIENT INSTRUCTIONS
Orders Placed This Encounter   Procedures    Wound cleansing and dressings                  Lower abdominal wound:        Apply warm compresses to the abdominal wound 1- 2x/day until compress cools down  Wash your hands with soap and water  Remove old dressing, discard into plastic bag and place in trash  Cleanse the wound with soap and water prior to applying a clean dressing  Do not use tissue or cotton balls  Do not scrub the wound  Pat dry using gauze  Shower yes - with wounds covered  Apply Clindamycin ointment to the wounds  Cover with gauze and secure with medipore tape  Change dressing daily  This was done today                       Standing Status:   Future     Standing Expiration Date:   1/10/2024    Wound cleansing and dressings     Left Leg incision:   this is healed  Apply moisturizer to skin following shower     Standing Status:   Future     Standing Expiration Date:   1/10/2024    Wound cleansing and dressings     Left Upper Left Leg wound:      Wash your hands with soap and water  Remove old dressing, discard into plastic bag and place in trash  Cleanse the wound with soap and water (cleaned with Prophase today)prior to applying a clean dressing  Do not use tissue or cotton balls  Do not scrub the wound  Pat dry using gauze  Shower yes - with wounds covered  Apply skin prep to skin surrounding wound  Apply Calcium alginate with silver to the leg/groin wound  Cover with ABD  Secure with Medipore tape  Change dressing daily      Patient heading dermatology appointment following [facility] appointment so DSD applied today        Standing Status:   Future     Standing Expiration Date:   1/10/2024

## 2023-01-10 NOTE — PATIENT INSTRUCTIONS
HIDRADENITIS SUPPURATIVA    Assessment and Plan:  Based on a thorough discussion of this condition and the management approach to it (including a comprehensive discussion of the known risks, side effects and potential benefits of treatment), the patient (family) agrees to implement the following specific plan:  Please start taking Doxycyline 100 mg 2 times daily with a full glass of water for the next 3 months  Please start applying Clindamycin 1% lotion up to 2 times daily to the affected area  Please re-start taking a Zinc supplement   Discussed Humira Injection; Will start PA   Please get labs done     What is hidradenitis suppurativa? Hidradenitis suppurativa is an inflammatory skin disease that affects apocrine gland-bearing skin in the axillae, in the groin, and under the breasts  It is characterised by recurrent boil-like nodules and abscesses that culminate in pus-like discharge, difficult-to-heal open wounds (sinuses) and scarring  Hidradenitis suppurativa also has significant psychological impact and many patients suffer from impairment of body image, depression and anxiety  The term hidradenitis implies it starts as an inflammatory disorder of sweat glands, which is now known to be incorrect  Hidradenitis suppurativa is also known as acne inversa  Who gets hidradenitis suppurativa? Hidradenitis often starts at puberty, and is most active between the ages of 21 and 36 years, and in women, can resolve at menopause  It is three times more common in females than in males  Risk factors include: Other family members with hidradenitis suppurativa  Obesity and insulin resistance/metabolic syndrome  Cigarette smoking  Follicular occlusion disorders: acne conglobata, dissecting cellulitis, pilonidal sinus  Inflammatory bowel disease (Crohn disease)  Rare autoinflammatory syndromes associated with abnormalities of PSTPIP1 gene  *    * PAPA syndrome (pyogenic arthritis, pyoderma gangrenosum and acne), [facility] syndrome (pyoderma gangrenosum, acne, suppurative hidradenitis) and PAPASH syndrome (pyogenic arthritis, pyoderma gangrenosum, acne, suppurative hidradenitis)  What causes hidradenitis suppurativa? Hidradenitis suppurativa is an autoinflammatory disorder  Although the exact cause is not yet understood, contributing factors include:  Friction from clothing and body folds  Aberrant immune response to commensal bacteria  Abnormal cutaneous or follicular microbiome  Follicular occlusion  Release of pro-inflammatory cytokines  Inflammation causing rupture of the follicular wall and destroying apocrine glands and ducts  Secondary bacterial infection  Certain drugs  What are the clinical features of hidradenitis suppurativa? Hidradenitis can affect a single or multiple areas in the armpits, neck, sub mammary area, and inner thighs  Anogenital involvement most commonly affects the groin, mons pubis, vulva (in females), sides of the scrotum (in males), perineum, buttocks and perianal folds  Signs include:  Open and closed comedones  Painful firm papules, larger nodules and pleated ridges  Pustules, fluctuant pseudocysts and abscesses  Pyogenic granulomas  Draining sinuses linking inflammatory lesions  Hypertrophic and atrophic scars  Many patients with hidradenitis suppurativa also suffer from other skin disorders, including acne, hirsutism and psoriasis  The severity and extent of hidradenitis suppurativa is recorded at assessment and when determining the impact of a treatment  The [place] system describes three distinct clinical stages:  Solitary or multiple, isolated abscess formation without scarring or sinus tracts  Recurrent abscesses, single or multiple widely  lesions, with sinus tract formation  Diffuse or broad involvement, with multiple interconnected sinus tracts and abscesses      Severe hidradenitis (Salas Stage 3) has been associated with:  Male sex  Axillary and perianal involvement  Obesity  Smoking  Higher risk of stroke, coronary artery disease, heart failure, and peripheral artery disease  Disease duration  What is the treatment for hidradenitis suppurativa? General measures  Weight loss; follow an anti-inflammatory, low-sugar, low-grain, low-dairy diet (mainly plants)  Smoking cessation: this can lead to improvement within a few months  Loose fitting clothing  Daily unfragranced antiperspirants  If prone to secondary infection, wash with antiseptics or take bleach baths  Apply hydrogen peroxide solution or medical grade honey to reduce malodour  Use peeling agents such as resorcinol 15% cream to de-roof nodules  Apply simple dressings to draining sinuses  Analgesics, such as paracetamol (acetaminophen), for pain control  Seek help to manage anxiety and depression  Medical management of hidradenitis suppurativa  Medical management of hidradenitis suppurativa is difficult  Treatment is required long term  Effective options are listed below  Antibiotics  Topical clindamycin, with benzoyl peroxide to reduce bacterial resistance  Short course of oral antibiotics for acute staphylococcal abscesses, eg flucloxacillin  Prolonged courses (minimum 3 months) of tetracycline, metronidazole, trimethoprim + sulphamethoxazole, fluoroquinolones, ertapenem or dapsone for their anti-inflammatory action  6-12 week courses of the combination of clindamycin (or doxycycline) and rifampicin for severe disease  Antiandrogens  Long-term oral contraceptive pill; antiandrogenic progesterones drospirenone or cyproterone acetate may be more effective than standard combined pills  These are more suitable than progesterone-only pills or devices  Spironolactone and finasteride  Response takes 6 months or longer      Immunomodulatory treatments for severe disease  Intralesional corticosteroids into nodules  Systemic corticosteroids short-term for flares  Methotrexate, ciclosporin, and azathioprine  TNF-? inhibitors adalimumab and infliximab, used in higher dose than required for psoriasis, are the most successful treatments to date  Note that paradoxically, they may sometimes induce new-onset hidradenitis suppurativa  Other biologics are under investigation, such as the IL-1?  antagonist, canakinumab    Other medical treatments  Metformin in patients with insulin resistance  Acitretin (unsuitable for females of childbearing potential)  Isotretinoin -- effective for acne but appears unhelpful for most cases of hidradenitis  Colchicine  Medical management of anxiety and depression    Surgical management of hidradenitis suppurativa  Incision and drainage of acute abscesses  Curettage and deroofing of nodules, abscesses and sinuses  Laser ablation of nodules, abscesses and sinuses  Wide local excision of persistent nodules  Radical excisional surgery of entire affected area  Nd:YAG laser hair removal

## 2023-01-10 NOTE — PROGRESS NOTES
Patient ID: Lisa Hale is a 40 y o  male Date of Birth 1980     Chief Complaint  Chief Complaint   Patient presents with   • Follow Up Wound Care Visit     Left groin wound       Allergies  Amoxicillin    Assessment:     Diagnoses and all orders for this visit:    Type 2 diabetes mellitus with diabetic polyneuropathy, without long-term current use of insulin ([facility] [address] )    History of hidradenitis suppurativa    Morbid obesity with body mass index (BMI) of 50 0 to 59 9 in [facility])    Non-healing surgical wound, initial encounter  -     Wound cleansing and dressings; Future  -     Wound cleansing and dressings; Future  -     Cancel: Wound cleansing and dressings; Future  -     Wound cleansing and dressings; Future    Abscess of skin of abdomen  -     lidocaine (XYLOCAINE) 4 % topical solution 5 mL  -     Wound cleansing and dressings; Future  -     Wound cleansing and dressings; Future  -     Cancel: Wound cleansing and dressings; Future  -     Wound cleansing and dressings; Future    Abscess of left groin  -     lidocaine (XYLOCAINE) 4 % topical solution 5 mL  -     Wound cleansing and dressings; Future  -     Wound cleansing and dressings; Future  -     Cancel: Wound cleansing and dressings; Future  -     Wound cleansing and dressings; Future    Noncompliance with treatment    Lymphedema of left lower extremity    Other orders  -     Debridement              Debridement   Wound 03/07/22 Surgical Leg Left;Upper;Medial    Universal Protocol:  Consent: Written consent obtained  Consent given by: patient  Time out: Immediately prior to procedure a "time out" was called to verify the correct patient, procedure, equipment, support staff and site/side marked as required    Timeout called at: 1/10/2023 9:37 AM   Patient identity confirmed: verbally with patient      Performed by: NP  Debridement type: selective  Pain control: lidocaine 4%  Pre-debridement measurements  Length (cm): 3 1  Width (cm): 12  Depth (cm): 0 2  Surface Area (cm^2): 37 2  Volume (cm^3): 7 44    Post-debridement measurements  Length (cm): 3 1  Width (cm): 12  Depth (cm): 0 2  Percent debrided: 50%  Surface Area (cm^2): 37 2  Area debrided (cm^2): 18 6  Volume (cm^3): 7 44  Devitalized tissue debrided: biofilm, fibrin and slough  Instrument(s) utilized: curette  Bleeding: small  Hemostasis obtained with: pressure  Procedural pain (0-10): 0  Post-procedural pain: 0   Response to treatment: procedure was tolerated well          Plan:  1  F/U visit  Left medial thigh wound debrided  Wound measuring the same  Abdominal wound cleansed with normal saline and gauze  Wound measures the same  Left groin wound epithelialized  Continue current plan of care for thigh and abdominal wounds  Patient scheduled to see dermatology later today  Will await for input  Patient will follow-up in 4 weeks  Wound 03/07/22 Surgical Leg Left;Upper;Medial (Active)   Wound Image Images linked 01/10/23 0905   Wound Description Pink;Granulation tissue; Yellow;Slough; Epithelialization 01/10/23 0905   Sarika-wound Assessment Scar Tissue; Maceration; Induration 01/10/23 0905   Wound Length (cm) 3 1 cm 01/10/23 0905   Wound Width (cm) 12 cm 01/10/23 0905   Wound Depth (cm) 0 2 cm 01/10/23 0905   Wound Surface Area (cm^2) 37 2 cm^2 01/10/23 0905   Wound Volume (cm^3) 7 44 cm^3 01/10/23 0905   Calculated Wound Volume (cm^3) 7 44 cm^3 01/10/23 0905   Change in Wound Size % -26 53 01/10/23 0905   Undermining 0 3 01/10/23 0905   Undermining is depth extending from 7-9 oclock 01/10/23 0905   Drainage Amount Large 01/10/23 0905   Drainage Description Serosanguineous 01/10/23 0905   Non-staged Wound Description Full thickness 01/10/23 0905   Dressing Status Intact 01/10/23 0905       Wound 04/11/22 Incision Abdomen Left;Medial;Lower (Active)   Wound Image Images linked 01/10/23 0909   Wound Description Granulation tissue; Epithelialization;Pink 01/10/23 0908   Sarika-wound Assessment Pink;Scar Tissue; Induration 01/10/23 0908   Wound Length (cm) 0 1 cm 01/10/23 0908   Wound Width (cm) 0 2 cm 01/10/23 0908   Wound Depth (cm) 0 1 cm 01/10/23 0908   Wound Surface Area (cm^2) 0 02 cm^2 01/10/23 0908   Wound Volume (cm^3) 0 002 cm^3 01/10/23 0908   Calculated Wound Volume (cm^3) 0 cm^3 01/10/23 0908   Change in Wound Size % 100 01/10/23 0908   Drainage Amount Scant 01/10/23 0908   Drainage Description Serous 01/10/23 0908   Non-staged Wound Description Full thickness 01/10/23 0908   Dressing Status Other (Comment) (MONSERRAT upon arrival) 01/10/23 0908       Wound 08/23/22 Incision Thigh Left;Proximal;Medial (Active)   Wound Image Images linked 01/10/23 0906   Wound Description Granulation tissue;Pink;Epithelialization 01/10/23 0904   Sarika-wound Assessment Pink 01/10/23 0904   Wound Length (cm) 0 cm 01/10/23 0904   Wound Width (cm) 0 cm 01/10/23 0904   Wound Depth (cm) 0 cm 01/10/23 0904   Wound Surface Area (cm^2) 0 cm^2 01/10/23 0904   Wound Volume (cm^3) 0 cm^3 01/10/23 0904   Calculated Wound Volume (cm^3) 0 cm^3 01/10/23 0904   Change in Wound Size % 100 01/10/23 0904   Undermining 0 4 01/10/23 0904   Undermining is depth extending from 9-12 01/10/23 0904   Drainage Amount None 01/10/23 0904   Non-staged Wound Description Not applicable 54/32/22 2211   Dressing Status Intact 01/10/23 0904       Wound 03/07/22 Surgical Leg Left;Upper;Medial (Active)   Date First Assessed/Time First Assessed: 03/07/22 0833   Primary Wound Type: Surgical  Location: Leg  Wound Location Orientation: Left;Upper;Medial  Wound Outcome: (c)        Wound 04/11/22 Incision Abdomen Left;Medial;Lower (Active)   Date First Assessed: 04/11/22   Primary Wound Type: Incision  Location: Abdomen  Wound Location Orientation: Left;Medial;Lower       Wound 08/23/22 Incision Thigh Left;Proximal;Medial (Active)   Date First Assessed: 08/23/22   Primary Wound Type:  Incision  Location: Thigh  Wound Location Orientation: Left;Proximal;Medial Wound Outcome: Healed       [REMOVED] Wound 05/26/21 Incision Thigh Left (Removed)   Resolved Date: 03/07/22  Date First Assessed/Time First Assessed: 05/26/21 1914   Pre-Existing Wound: Yes  Primary Wound Type: Incision  Location: Thigh  Wound Location Orientation: Left  Wound Description (Comments): 2 incisions  Incision's 1st Dress    [REMOVED] Wound 05/26/21 Surgical Other (Comment) Groin Left (Removed)   Resolved Date: 03/07/22  Date First Assessed/Time First Assessed: 05/26/21 1916   Pre-Existing Wound: Yes  Primary Wound Type: Surgical  Traumatic Wound Type: (c) Other (Comment)  Location: Groin  Wound Location Orientation: Left  Wound Outcome: Unkno    [REMOVED] Wound 06/01/21 Other (comment) Cellulitis Pretibial Left (Removed)   Resolved Date: 03/07/22  Date First Assessed/Time First Assessed: 06/01/21 1930   Pre-Existing Wound: Yes  Primary Wound Type: Other (comment)  Traumatic Wound Type: Cellulitis  Location: Pretibial  Wound Location Orientation: Left  Dressing Status: C  [REMOVED] Wound 06/02/21 Hip Anterior;Right (Removed)   Resolved Date: 03/07/22  Date First Assessed/Time First Assessed: 06/02/21 1028   Location: Hip  Wound Location Orientation: Anterior;Right  Wound Outcome: Unknown (No longer present)       [REMOVED] Wound 06/02/21 Sacrum (Removed)   Resolved Date: 03/07/22  Date First Assessed/Time First Assessed: 06/02/21 1034   Location: Sacrum  Wound Outcome: Unknown (No longer present)       [REMOVED] Wound 07/06/21 Leg Left (Removed)   Resolved Date: 03/07/22  Date First Assessed/Time First Assessed: 07/06/21 1639   Location: Leg  Wound Location Orientation: Left  Incision's 1st Dressing: KERLIX SUPER SPONGE 6 IN (x2), ACE WRAP 6 IN UNSTERILE (x4), SPONGE GAUZE 4 X 4 16 PLY STRL FÁTIMA           [REMOVED] Wound 03/30/22 Sacrum (Removed)   Resolved Date: 04/11/22  Date First Assessed/Time First Assessed: 03/30/22 1610   Location: Sacrum  Wound Description (Comments): 4/1/22 Stage 2 POA  Wound Outcome: (c) Other (Comment)       [REMOVED] Wound 03/30/22 Surgical Thigh Anterior; Left (Removed)   Resolved Date: 04/11/22  Date First Assessed/Time First Assessed: 03/30/22 1614   Pre-Existing Wound: Yes  Primary Wound Type: Surgical  Location: Thigh  Wound Location Orientation: Anterior; Left  Wound Description (Comments): Surgical Wound  Dressing    [REMOVED] Wound 03/30/22 Abdomen Left (Removed)   Resolved Date: 04/11/22  Date First Assessed/Time First Assessed: 03/30/22 1616   Location: Abdomen  Wound Location Orientation: Left  Wound Outcome: (c) Other (Comment)       [REMOVED] Wound 03/30/22 Thigh Anterior;Right (Removed)   Resolved Date: 04/11/22  Date First Assessed/Time First Assessed: 03/30/22 1617   Location: Thigh  Wound Location Orientation: Anterior;Right  Wound Outcome: (c) Other (Comment)       [REMOVED] Wound 03/30/22 Abscess Cellulitis Thigh Anterior;Left;Proximal (Removed)   Resolved Date: 04/11/22  Date First Assessed/Time First Assessed: 03/30/22 1618   Primary Wound Type: Abscess  Traumatic Wound Type: Cellulitis  Location: Thigh  Wound Location Orientation: Anterior;Left;Proximal  Dressing Status: New drainage  Wound     [REMOVED] Wound 05/04/22 Leg Left; Inner; Lower (Removed)   Resolved Date: 06/21/22  Date First Assessed/Time First Assessed: 05/04/22 2200   Pre-Existing Wound: Yes  Location: Leg  Wound Location Orientation: Left; Inner; Lower  Wound Outcome: (c) Other (Comment)       [REMOVED] Wound 05/04/22 Other (comment) Sacrum (Removed)   Resolved Date: 06/21/22  Date First Assessed/Time First Assessed: 05/04/22 0001   Pre-Existing Wound: Yes  Primary Wound Type: Other (comment)  Location: Sacrum  Wound Description (Comments): Pt states wound is closed/healed & declined assessment 6/21    Subjective:     F/U visit for left thigh, groin, and abdominal wounds  No new complaints  Patient reports he is scheduled to see dermatology later this morning    He denies any pain, fevers, or chills  The following portions of the patient's history were reviewed and updated as appropriate:   He  has a past medical history of Arthritis, Breathing difficulty, Coronary artery disease, Diabetes mellitus ([place] [address] ), Diabetic neuropathy ([place] [address] ) (5/28/2021), Heart disease, Hidradenitis suppurativa, History of transfusion, Hyperlipidemia, Hypertension, MI (myocardial infarction) ([place] [address] ), Morbid obesity with BMI of 50 0-59 9, adult ([place] [address] ), Nicotine dependence, Obesity, and Pilonidal cyst   He   Patient Active Problem List    Diagnosis Date Noted   • Lymphedema of both lower extremities 06/15/2022   • History of coronary angioplasty with insertion of stent 06/13/2022   • Colitis 05/04/2022   • Abscess of multiple sites of buttock 05/04/2022   • Anemia 04/01/2022   • Swelling of left lower extremity 03/30/2022   • History of hidradenitis suppurativa 03/30/2022   • Nocturnal hypoxia 12/04/2021   • Cellulitis of multiple sites of trunk 12/02/2021   • Chest pain 12/02/2021   • Cellulitis of left lower extremity 06/23/2021   • H/O drainage of abscess 06/21/2021   • Difficult intubation 05/28/2021   • Morbid obesity with body mass index (BMI) of 50 0 to 59 9 in adult [facility]) 03/10/2021   • Type 1 non-ST elevation myocardial infarction (NSTEMI) ([place] [address] ) 11/29/2020   • Left groin mass 11/29/2020   • Type 2 diabetes mellitus with diabetic polyneuropathy, without long-term current use of insulin ([place] [address] ) 07/26/2018   • Dyslipidemia 01/23/2017   • Pilonidal cyst 12/13/2016   • Essential hypertension 01/14/2016   • Abnormal liver enzymes 09/23/2014   • Coronary artery disease 06/25/2014     He  has a past surgical history that includes Leg Surgery (Left); Coronary angioplasty with stent; Incision and drainage of wound (N/A, 1/24/2017); pr exc b9 lesion mrgn xcp sk tg t/a/l >4 0 cm (Left, 4/6/2021); pr negative pressure wound therapy dme </= 50 sq cm (Left, 4/6/2021); Wound debridement (Left, 4/17/2021);  Wound debridement (Left, 4/22/2021); Wound debridement (Left, 5/26/2021); Wound debridement (Left, 5/28/2021); pr debridement subcutaneous tissue 20 sq cm/< (Left, 7/6/2021); pr excision h/p/p/u complex repair (Left, 7/6/2021); and Cardiac surgery  His family history includes Diabetes in his father; Heart disease in his father and mother; Hypertension in his mother  He  reports that he quit smoking about 22 months ago  His smoking use included cigarettes  He started smoking about 2 years ago  He has a 30 00 pack-year smoking history  He has never used smokeless tobacco  He reports that he does not currently use alcohol  He reports that he does not use drugs  Current Outpatient Medications   Medication Sig Dispense Refill   • albuterol (ProAir HFA) 90 mcg/act inhaler Inhale 2 puffs every 6 (six) hours as needed for wheezing 8 5 g 3   • amLODIPine (NORVASC) 10 mg tablet TAKE ONE TABLET BY MOUTH EVERY DAY 90 tablet 1   • aspirin (ECOTRIN LOW STRENGTH) 81 mg EC tablet Take 1 tablet (81 mg total) by mouth daily 30 tablet 0   • atorvastatin (LIPITOR) 80 mg tablet Take 1 tablet (80 mg total) by mouth daily 90 tablet 1   • carvedilol (COREG) 6 25 mg tablet Take 1 tablet (6 25 mg total) by mouth 2 (two) times a day with meals 180 tablet 1   • cetirizine (ZyrTEC) 10 mg tablet Take 1 tablet (10 mg total) by mouth daily 30 tablet 1   • clindamycin (CLINDAGEL) 1 % gel Apply topically 2 (two) times a day 30 g 0   • EPINEPHrine (EPIPEN) 0 3 mg/0 3 mL SOAJ Inject 0 3 mL (0 3 mg total) into a muscle once for 1 dose 0 6 mL 0   • Insulin Pen Needle (PEN NEEDLES 29GX1/2") 29G X 12MM MISC Use 1 needle daily for subcutaneous injections (Patient not taking: No sig reported) 50 each 3   • liraglutide (VICTOZA) injection Inject 0 1 mL (0 6 mg total) under the skin daily Start at 0 6 mg daily for a week, then increase to 1 2 mg daily on the second week, then increase to 1 8 mg daily on the third week and going forward   3 mL 3   • metFORMIN (GLUCOPHAGE) 1000 MG tablet Take 1 tablet (1,000 mg total) by mouth 2 (two) times a day with meals 180 tablet 1   • mupirocin (BACTROBAN) 2 % ointment Apply topically daily 22 g 2   • Omega-3 Fatty Acids (fish oil) 1,000 mg Take 1 capsule (1,000 mg total) by mouth 2 (two) times a day 180 capsule 2   • oxyCODONE-acetaminophen (PERCOCET) 5-325 mg per tablet      • ticagrelor (Brilinta) 90 MG Take 1 tablet (90 mg total) by mouth every 12 (twelve) hours (Patient not taking: Reported on 5/2/2022) 180 tablet 2   • zinc sulfate (ZINCATE) 220 mg capsule Take 1 capsule (220 mg total) by mouth daily 30 capsule 0     No current facility-administered medications for this visit  He is allergic to amoxicillin       Review of Systems   Constitutional: Negative  HENT: Negative for ear pain and hearing loss  Eyes: Negative for pain  Respiratory: Negative for chest tightness and shortness of breath  Cardiovascular: Positive for leg swelling  Negative for chest pain and palpitations  Gastrointestinal: Negative for diarrhea, nausea and vomiting  Genitourinary: Negative for dysuria  Musculoskeletal: Negative for gait problem  Skin: Positive for wound  Neurological: Negative for tremors and weakness  Psychiatric/Behavioral: Negative for behavioral problems, confusion and suicidal ideas  Objective:       Wound 03/07/22 Surgical Leg Left;Upper;Medial (Active)   Wound Image Images linked 01/10/23 0905   Wound Description Pink;Granulation tissue; Yellow;Slough; Epithelialization 01/10/23 0905   Sarika-wound Assessment Scar Tissue; Maceration; Induration 01/10/23 0905   Wound Length (cm) 3 1 cm 01/10/23 0905   Wound Width (cm) 12 cm 01/10/23 0905   Wound Depth (cm) 0 2 cm 01/10/23 0905   Wound Surface Area (cm^2) 37 2 cm^2 01/10/23 0905   Wound Volume (cm^3) 7 44 cm^3 01/10/23 0905   Calculated Wound Volume (cm^3) 7 44 cm^3 01/10/23 0905   Change in Wound Size % -26 53 01/10/23 0905   Undermining 0 3 01/10/23 0905 Undermining is depth extending from 7-9 oclock 01/10/23 0905   Drainage Amount Large 01/10/23 0905   Drainage Description Serosanguineous 01/10/23 0905   Non-staged Wound Description Full thickness 01/10/23 0905   Dressing Status Intact 01/10/23 0905       Wound 04/11/22 Incision Abdomen Left;Medial;Lower (Active)   Wound Image Images linked 01/10/23 0909   Wound Description Granulation tissue; Epithelialization;Pink 01/10/23 0908   Sarika-wound Assessment Pink;Scar Tissue; Induration 01/10/23 0908   Wound Length (cm) 0 1 cm 01/10/23 0908   Wound Width (cm) 0 2 cm 01/10/23 0908   Wound Depth (cm) 0 1 cm 01/10/23 0908   Wound Surface Area (cm^2) 0 02 cm^2 01/10/23 0908   Wound Volume (cm^3) 0 002 cm^3 01/10/23 0908   Calculated Wound Volume (cm^3) 0 cm^3 01/10/23 0908   Change in Wound Size % 100 01/10/23 0908   Drainage Amount Scant 01/10/23 0908   Drainage Description Serous 01/10/23 0908   Non-staged Wound Description Full thickness 01/10/23 0908   Dressing Status Other (Comment) (MONSERRAT upon arrival) 01/10/23 0908       Wound 08/23/22 Incision Thigh Left;Proximal;Medial (Active)   Wound Image Images linked 01/10/23 0906   Wound Description Granulation tissue;Pink;Epithelialization 01/10/23 0904   Sarika-wound Assessment Pink 01/10/23 0904   Wound Length (cm) 0 cm 01/10/23 0904   Wound Width (cm) 0 cm 01/10/23 0904   Wound Depth (cm) 0 cm 01/10/23 0904   Wound Surface Area (cm^2) 0 cm^2 01/10/23 0904   Wound Volume (cm^3) 0 cm^3 01/10/23 0904   Calculated Wound Volume (cm^3) 0 cm^3 01/10/23 0904   Change in Wound Size % 100 01/10/23 0904   Undermining 0 4 01/10/23 0904   Undermining is depth extending from 9-12 01/10/23 0904   Drainage Amount None 01/10/23 0904   Non-staged Wound Description Not applicable 38/87/22 8401   Dressing Status Intact 01/10/23 0904       /81   Pulse 75   Temp 97 9 °F (36 6 °C)   Resp 18                     Wound Instructions:  Orders Placed This Encounter   Procedures   • Wound cleansing and dressings                  Lower abdominal wound:        Apply warm compresses to the abdominal wound 1- 2x/day until compress cools down  Wash your hands with soap and water  Remove old dressing, discard into plastic bag and place in trash  Cleanse the wound with soap and water prior to applying a clean dressing  Do not use tissue or cotton balls  Do not scrub the wound  Pat dry using gauze  Shower yes - with wounds covered  Apply Clindamycin ointment to the wounds  Cover with gauze and secure with medipore tape  Change dressing daily    This was done today                       Standing Status:   Future     Standing Expiration Date:   1/10/2024   • Wound cleansing and dressings     Left Leg incision:   this is healed  Apply moisturizer to skin following shower     Standing Status:   Future     Standing Expiration Date:   1/10/2024   • Wound cleansing and dressings     Left Upper Left Leg wound:      Wash your hands with soap and water  Remove old dressing, discard into plastic bag and place in trash  Cleanse the wound with soap and water (cleaned with Prophase today)prior to applying a clean dressing  Do not use tissue or cotton balls  Do not scrub the wound  Pat dry using gauze  Shower yes - with wounds covered  Apply skin prep to skin surrounding wound  Apply Calcium alginate with silver to the leg/groin wound  Cover with ABD  Secure with Medipore tape  Change dressing daily  Patient heading dermatology appointment following [facility] appointment so DSD applied today        Standing Status:   Future     Standing Expiration Date:   1/10/2024   • Debridement     This order was created via procedure documentation        Diagnosis ICD-10-CM Associated Orders   1  Type 2 diabetes mellitus with diabetic polyneuropathy, without long-term current use of insulin ([facility])  E11 42       2  History of hidradenitis suppurativa  L73 2       3   Morbid obesity with body mass index (BMI) of 50 0 to 59 9 in adult ([facility] )  E66 01     Z68 43       4  Non-healing surgical wound, initial encounter  T81 89XA Wound cleansing and dressings     Wound cleansing and dressings     Wound cleansing and dressings      5  Abscess of skin of abdomen  L02 211 lidocaine (XYLOCAINE) 4 % topical solution 5 mL     Wound cleansing and dressings     Wound cleansing and dressings     Wound cleansing and dressings      6  Abscess of left groin  L02 214 lidocaine (XYLOCAINE) 4 % topical solution 5 mL     Wound cleansing and dressings     Wound cleansing and dressings     Wound cleansing and dressings      7  Noncompliance with treatment  Z91 199       8   Lymphedema of left lower extremity  I89 0

## 2023-01-10 NOTE — TELEPHONE ENCOUNTER
Assessment and Plan:  Based on a thorough discussion of this condition and the management approach to it (including a comprehensive discussion of the known risks, side effects and potential benefits of treatment), the patient (family) agrees to implement the following specific plan:  • Please start taking Doxycyline 100 mg 2 times daily with a full glass of water for the next 3 months  • Please start applying Clindamycin 1% lotion up to 2 times daily to the affected area  • Please re-start taking a Zinc supplement   • Discussed Humira Injection (Loading dose 60 sub Q start; 80 every other week subQ );  Will start PA   • Please get labs done

## 2023-01-12 DIAGNOSIS — I10 ESSENTIAL HYPERTENSION: Primary | ICD-10-CM

## 2023-01-12 RX ORDER — LISINOPRIL 20 MG/1
TABLET ORAL
Qty: 90 TABLET | Refills: 1 | Status: SHIPPED | OUTPATIENT
Start: 2023-01-12

## 2023-02-07 ENCOUNTER — OFFICE VISIT (OUTPATIENT)
Dept: WOUND CARE | Facility: HOSPITAL | Age: 43
End: 2023-02-07

## 2023-02-07 VITALS
SYSTOLIC BLOOD PRESSURE: 133 MMHG | HEART RATE: 66 BPM | RESPIRATION RATE: 18 BRPM | TEMPERATURE: 96.9 F | DIASTOLIC BLOOD PRESSURE: 80 MMHG

## 2023-02-07 DIAGNOSIS — T81.89XA NON-HEALING SURGICAL WOUND, INITIAL ENCOUNTER: ICD-10-CM

## 2023-02-07 DIAGNOSIS — E11.42 TYPE 2 DIABETES MELLITUS WITH DIABETIC POLYNEUROPATHY, WITHOUT LONG-TERM CURRENT USE OF INSULIN (HCC): Primary | ICD-10-CM

## 2023-02-07 DIAGNOSIS — L02.211 ABSCESS OF SKIN OF ABDOMEN: ICD-10-CM

## 2023-02-07 DIAGNOSIS — L30.4 INTERTRIGINOUS DERMATITIS ASSOCIATED WITH MOISTURE: ICD-10-CM

## 2023-02-07 DIAGNOSIS — I89.0 LYMPHEDEMA OF LEFT LOWER EXTREMITY: ICD-10-CM

## 2023-02-07 DIAGNOSIS — L02.214 ABSCESS OF LEFT GROIN: ICD-10-CM

## 2023-02-07 DIAGNOSIS — E66.01 MORBID OBESITY WITH BODY MASS INDEX (BMI) OF 50.0 TO 59.9 IN ADULT (HCC): ICD-10-CM

## 2023-02-07 DIAGNOSIS — L73.2 HIDRADENITIS SUPPURATIVA: ICD-10-CM

## 2023-02-07 RX ORDER — LIDOCAINE HYDROCHLORIDE 40 MG/ML
5 SOLUTION TOPICAL ONCE
Status: COMPLETED | OUTPATIENT
Start: 2023-02-07 | End: 2023-02-07

## 2023-02-07 RX ORDER — NYSTATIN 100000 [USP'U]/G
POWDER TOPICAL 2 TIMES DAILY
Qty: 15 G | Refills: 0 | Status: SHIPPED | OUTPATIENT
Start: 2023-02-07

## 2023-02-07 RX ADMIN — LIDOCAINE HYDROCHLORIDE 5 ML: 40 SOLUTION TOPICAL at 09:27

## 2023-02-07 NOTE — PROGRESS NOTES
Patient ID: Keara Christianson is a 40 y o  male Date of Birth 1980     Chief Complaint  Chief Complaint   Patient presents with   • Follow Up Wound Care Visit     Left thigh and abd wounds       Allergies  Amoxicillin    Assessment:     Diagnoses and all orders for this visit:    Type 2 diabetes mellitus with diabetic polyneuropathy, without long-term current use of insulin (HCC)  -     lidocaine (XYLOCAINE) 4 % topical solution 5 mL  -     Wound cleansing and dressings; Future  -     Wound cleansing and dressings; Future    History of hidradenitis suppurativa  -     lidocaine (XYLOCAINE) 4 % topical solution 5 mL  -     Wound cleansing and dressings; Future  -     Wound cleansing and dressings; Future    Morbid obesity with body mass index (BMI) of 50 0 to 59 9 in adult (HCC)  -     lidocaine (XYLOCAINE) 4 % topical solution 5 mL  -     Wound cleansing and dressings; Future  -     Wound cleansing and dressings; Future    Non-healing surgical wound, initial encounter  -     lidocaine (XYLOCAINE) 4 % topical solution 5 mL  -     Wound cleansing and dressings; Future  -     Wound cleansing and dressings; Future    Abscess of skin of abdomen  -     lidocaine (XYLOCAINE) 4 % topical solution 5 mL  -     Wound cleansing and dressings; Future  -     Wound cleansing and dressings; Future    Abscess of left groin  -     lidocaine (XYLOCAINE) 4 % topical solution 5 mL  -     Wound cleansing and dressings; Future  -     Wound cleansing and dressings; Future    Lymphedema of left lower extremity  -     lidocaine (XYLOCAINE) 4 % topical solution 5 mL  -     Wound cleansing and dressings; Future  -     Wound cleansing and dressings; Future              Debridement   Wound 03/07/22 Surgical Leg Left;Upper;Medial    Universal Protocol:  Consent: Written consent obtained    Consent given by: patient  Time out: Immediately prior to procedure a "time out" was called to verify the correct patient, procedure, equipment, support staff and site/side marked as required  Timeout called at: 2/7/2023 9:56 AM   Patient identity confirmed: verbally with patient      Performed by: NP  Debridement type: selective  Pain control: lidocaine 4%  Pre-debridement measurements  Length (cm): 3  Width (cm): 12  Depth (cm): 0 2  Surface Area (cm^2): 36  Volume (cm^3): 7 2    Post-debridement measurements  Length (cm): 3  Width (cm): 12  Depth (cm): 0 2  Percent debrided: 100%  Surface Area (cm^2): 36  Area debrided (cm^2): 36  Volume (cm^3): 7 2  Devitalized tissue debrided: biofilm, fibrin and slough  Instrument(s) utilized: curette  Bleeding: small  Hemostasis obtained with: pressure  Procedural pain (0-10): 0  Post-procedural pain: 0   Response to treatment: procedure was tolerated well          Plan:  1  F/U visit  Left medial thigh wound debrided  Wound measuring the same  We will change treatment ConvaMax change daily  2  Patient is continue to apply clindamycin lotion to abdominal wounds as recommended by dermatology  3  We will prescribe nystatin powder to be applied to abdominal fold due to moisture associated dermatitis  4  Patient is continue to take doxycycline as prescribed by dermatology  5  Counseled patient to continue monitoring the erythema and induration of his abdomen  If erythema and induration either does not improve or worsens he is to contact dermatology or go to the nearest urgent care or ER  Also recommended patient could apply warm moist compresses over dressing to help with induration  Patient verbalized agreement and understanding  6  Patient is scheduled to follow-up with dermatology in 1 month  7  Patient will follow-up at the wound center in 1 month     Wound 03/07/22 Surgical Leg Left;Upper;Medial (Active)   Wound Image Images linked 02/07/23 0919   Wound Description Pink;Granulation tissue; Yellow;Slough; Epithelialization 02/07/23 0919   Sarika-wound Assessment Scar Tissue; Maceration; Induration 02/07/23 0919 Wound Length (cm) 3 cm 02/07/23 0919   Wound Width (cm) 12 cm 02/07/23 0919   Wound Depth (cm) 0 2 cm 02/07/23 0919   Wound Surface Area (cm^2) 36 cm^2 02/07/23 0919   Wound Volume (cm^3) 7 2 cm^3 02/07/23 0919   Calculated Wound Volume (cm^3) 7 2 cm^3 02/07/23 0919   Change in Wound Size % -22 45 02/07/23 0919   Undermining 0 4 02/07/23 0919   Undermining is depth extending from 7-9 clock 02/07/23 0919   Drainage Amount Large 02/07/23 0919   Drainage Description Serosanguineous 02/07/23 0919   Non-staged Wound Description Full thickness 02/07/23 0919   Dressing Status Intact 02/07/23 0919       Wound 04/11/22 Incision Abdomen Left;Medial;Lower (Active)   Wound Image Images linked 02/07/23 0925   Wound Description Granulation tissue; Epithelialization;Pink 02/07/23 0925   Sarika-wound Assessment Scar Tissue; Induration; Purple 02/07/23 0925   Wound Length (cm) 5 cm (multiple pinpoint openings measured) 02/07/23 0925   Wound Width (cm) 5 5 cm 02/07/23 0925   Wound Depth (cm) 0 1 cm 02/07/23 0925   Wound Surface Area (cm^2) 27 5 cm^2 02/07/23 0925   Wound Volume (cm^3) 2 75 cm^3 02/07/23 0925   Calculated Wound Volume (cm^3) 2 75 cm^3 02/07/23 0925   Change in Wound Size % -1618 75 02/07/23 0925   Drainage Amount Moderate 02/07/23 0925   Drainage Description Milky; Serosanguineous 02/07/23 0925   Non-staged Wound Description Full thickness 02/07/23 0925   Dressing Status Other (Comment) (no dressing) 02/07/23 0925       Wound 03/07/22 Surgical Leg Left;Upper;Medial (Active)   Date First Assessed/Time First Assessed: 03/07/22 0833   Primary Wound Type: Surgical  Location: Leg  Wound Location Orientation: Left;Upper;Medial  Wound Outcome: (c)        Wound 04/11/22 Incision Abdomen Left;Medial;Lower (Active)   Date First Assessed: 04/11/22   Primary Wound Type:  Incision  Location: Abdomen  Wound Location Orientation: Left;Medial;Lower       [REMOVED] Wound 05/26/21 Incision Thigh Left (Removed)   Resolved Date: 03/07/22 Date First Assessed/Time First Assessed: 05/26/21 1914   Pre-Existing Wound: Yes  Primary Wound Type: Incision  Location: Thigh  Wound Location Orientation: Left  Wound Description (Comments): 2 incisions  Incision's 1st Dress    [REMOVED] Wound 05/26/21 Surgical Other (Comment) Groin Left (Removed)   Resolved Date: 03/07/22  Date First Assessed/Time First Assessed: 05/26/21 1916   Pre-Existing Wound: Yes  Primary Wound Type: Surgical  Traumatic Wound Type: (c) Other (Comment)  Location: Groin  Wound Location Orientation: Left  Wound Outcome: Unkno    [REMOVED] Wound 06/01/21 Other (comment) Cellulitis Pretibial Left (Removed)   Resolved Date: 03/07/22  Date First Assessed/Time First Assessed: 06/01/21 1930   Pre-Existing Wound: Yes  Primary Wound Type: Other (comment)  Traumatic Wound Type: Cellulitis  Location: Pretibial  Wound Location Orientation: Left  Dressing Status: C  [REMOVED] Wound 06/02/21 Hip Anterior;Right (Removed)   Resolved Date: 03/07/22  Date First Assessed/Time First Assessed: 06/02/21 1028   Location: Hip  Wound Location Orientation: Anterior;Right  Wound Outcome: Unknown (No longer present)       [REMOVED] Wound 06/02/21 Sacrum (Removed)   Resolved Date: 03/07/22  Date First Assessed/Time First Assessed: 06/02/21 1034   Location: Sacrum  Wound Outcome: Unknown (No longer present)       [REMOVED] Wound 07/06/21 Leg Left (Removed)   Resolved Date: 03/07/22  Date First Assessed/Time First Assessed: 07/06/21 1639   Location: Leg  Wound Location Orientation: Left  Incision's 1st Dressing: KERLIX SUPER SPONGE 6 IN (x2), ACE WRAP 6 IN UNSTERILE (x4), SPONGE GAUZE 4 X 4 16 PLY STRL FÁTIMA    [REMOVED] Wound 03/30/22 Sacrum (Removed)   Resolved Date: 04/11/22  Date First Assessed/Time First Assessed: 03/30/22 1610   Location: Sacrum  Wound Description (Comments): 4/1/22 Stage 2 POA  Wound Outcome: (c) Other (Comment)       [REMOVED] Wound 03/30/22 Surgical Thigh Anterior; Left (Removed)   Resolved Date: 04/11/22  Date First Assessed/Time First Assessed: 03/30/22 1614   Pre-Existing Wound: Yes  Primary Wound Type: Surgical  Location: Thigh  Wound Location Orientation: Anterior; Left  Wound Description (Comments): Surgical Wound  Dressing    [REMOVED] Wound 03/30/22 Abdomen Left (Removed)   Resolved Date: 04/11/22  Date First Assessed/Time First Assessed: 03/30/22 1616   Location: Abdomen  Wound Location Orientation: Left  Wound Outcome: (c) Other (Comment)       [REMOVED] Wound 03/30/22 Thigh Anterior;Right (Removed)   Resolved Date: 04/11/22  Date First Assessed/Time First Assessed: 03/30/22 1617   Location: Thigh  Wound Location Orientation: Anterior;Right  Wound Outcome: (c) Other (Comment)       [REMOVED] Wound 03/30/22 Abscess Cellulitis Thigh Anterior;Left;Proximal (Removed)   Resolved Date: 04/11/22  Date First Assessed/Time First Assessed: 03/30/22 1618   Primary Wound Type: Abscess  Traumatic Wound Type: Cellulitis  Location: Thigh  Wound Location Orientation: Anterior;Left;Proximal  Dressing Status: New drainage  Wound     [REMOVED] Wound 05/04/22 Leg Left; Inner; Lower (Removed)   Resolved Date: 06/21/22  Date First Assessed/Time First Assessed: 05/04/22 2200   Pre-Existing Wound: Yes  Location: Leg  Wound Location Orientation: Left; Inner; Lower  Wound Outcome: (c) Other (Comment)       [REMOVED] Wound 05/04/22 Other (comment) Sacrum (Removed)   Resolved Date: 06/21/22  Date First Assessed/Time First Assessed: 05/04/22 0001   Pre-Existing Wound: Yes  Primary Wound Type: Other (comment)  Location: Sacrum  Wound Description (Comments): Pt states wound is closed/healed & declined assessment 6/21    [REMOVED] Wound 08/23/22 Incision Thigh Left;Proximal;Medial (Removed)   Resolved Date: 01/10/23  Date First Assessed: 08/23/22   Primary Wound Type:  Incision  Location: Thigh  Wound Location Orientation: Left;Proximal;Medial  Wound Outcome: Healed       Subjective:     F/u visit left thigh and abdominal wounds  Patient reports he was seen by dermatology on 1/10  Per dermatology's note, patient was started on doxycycline 100 mg twice daily x3 months, he was also prescribed clindamycin lotion which he is to apply to his abdominal wounds daily, dermatology also recommended starting patient on Humira  Patient reports he is unsure if his insurance will cover Humira  Patient reports he is currently taking the doxycycline and using the clindamycin lotion as prescribed  He is scheduled to follow-up with dermatology again in 1 month  Patient reports he has noticed increased erythema and induration of his abdomen which started in the last day or so  He denies any pain with palpation of area  Patient still reports a large amount of drainage from his left medial thigh wound  Patient reports he changes his dressing 2-3 times per day due to the drainage  He denies any pain, fevers, or chills        The following portions of the patient's history were reviewed and updated as appropriate:   He  has a past medical history of Arthritis, Breathing difficulty, Coronary artery disease, Diabetes mellitus ([facility] [address] ), Diabetic neuropathy ([facility] [address] ) (5/28/2021), Heart disease, Hidradenitis suppurativa, History of transfusion, Hyperlipidemia, Hypertension, MI (myocardial infarction) ([facility] [address] ), Morbid obesity with BMI of 50 0-59 9, adult ([address] ), Nicotine dependence, Obesity, and Pilonidal cyst   He   Patient Active Problem List    Diagnosis Date Noted   • Lymphedema of both lower extremities 06/15/2022   • History of coronary angioplasty with insertion of stent 06/13/2022   • Colitis 05/04/2022   • Abscess of multiple sites of buttock 05/04/2022   • Anemia 04/01/2022   • Swelling of left lower extremity 03/30/2022   • History of hidradenitis suppurativa 03/30/2022   • Nocturnal hypoxia 12/04/2021   • Cellulitis of multiple sites of trunk 12/02/2021   • Chest pain 12/02/2021   • Cellulitis of left lower extremity 06/23/2021   • H/O drainage of abscess 06/21/2021   • Difficult intubation 05/28/2021   • Morbid obesity with body mass index (BMI) of 50 0 to 59 9 in adult [facility]) 03/10/2021   • Type 1 non-ST elevation myocardial infarction (NSTEMI) ([facility] ) 11/29/2020   • Left groin mass 11/29/2020   • Type 2 diabetes mellitus with diabetic polyneuropathy, without long-term current use of insulin ([place] [address] ) 07/26/2018   • Dyslipidemia 01/23/2017   • Pilonidal cyst 12/13/2016   • Essential hypertension 01/14/2016   • Abnormal liver enzymes 09/23/2014   • Coronary artery disease 06/25/2014     He  has a past surgical history that includes Leg Surgery (Left); Coronary angioplasty with stent; Incision and drainage of wound (N/A, 1/24/2017); pr exc b9 lesion mrgn xcp sk tg t/a/l >4 0 cm (Left, 4/6/2021); pr negative pressure wound therapy dme </= 50 sq cm (Left, 4/6/2021); Wound debridement (Left, 4/17/2021); Wound debridement (Left, 4/22/2021); Wound debridement (Left, 5/26/2021); Wound debridement (Left, 5/28/2021); pr debridement subcutaneous tissue 20 sq cm/< (Left, 7/6/2021); pr excision h/p/p/u complex repair (Left, 7/6/2021); and Cardiac surgery  His family history includes Diabetes in his father; Heart disease in his father and mother; Hypertension in his mother  He  reports that he quit smoking about 23 months ago  His smoking use included cigarettes  He started smoking about 2 years ago  He has a 30 00 pack-year smoking history  He has never used smokeless tobacco  He reports that he does not currently use alcohol  He reports that he does not use drugs    Current Outpatient Medications   Medication Sig Dispense Refill   • albuterol (ProAir HFA) 90 mcg/act inhaler Inhale 2 puffs every 6 (six) hours as needed for wheezing (Patient not taking: Reported on 1/10/2023) 8 5 g 3   • amLODIPine (NORVASC) 10 mg tablet TAKE ONE TABLET BY MOUTH EVERY DAY 90 tablet 1   • aspirin (ECOTRIN LOW STRENGTH) 81 mg EC tablet Take 1 tablet (81 mg total) by mouth daily 30 tablet 0   • atorvastatin (LIPITOR) 80 mg tablet Take 1 tablet (80 mg total) by mouth daily 90 tablet 1   • carvedilol (COREG) 6 25 mg tablet Take 1 tablet (6 25 mg total) by mouth 2 (two) times a day with meals 180 tablet 1   • cetirizine (ZyrTEC) 10 mg tablet Take 1 tablet (10 mg total) by mouth daily 30 tablet 1   • clindamycin (CLEOCIN T) 1 % lotion Apply topically 2 (two) times a day 60 mL 0   • clindamycin (CLINDAGEL) 1 % gel Apply topically 2 (two) times a day 30 g 0   • doxycycline monohydrate (MONODOX) 100 mg capsule Please take 2 times daily with water and a full glass of water 60 capsule 1   • EPINEPHrine (EPIPEN) 0 3 mg/0 3 mL SOAJ Inject 0 3 mL (0 3 mg total) into a muscle once for 1 dose 0 6 mL 0   • Insulin Pen Needle (PEN NEEDLES 29GX1/2") 29G X 12MM MISC Use 1 needle daily for subcutaneous injections (Patient not taking: Reported on 5/4/2022) 50 each 3   • liraglutide (VICTOZA) injection Inject 0 1 mL (0 6 mg total) under the skin daily Start at 0 6 mg daily for a week, then increase to 1 2 mg daily on the second week, then increase to 1 8 mg daily on the third week and going forward   3 mL 3   • lisinopril (ZESTRIL) 20 mg tablet TAKE ONE TABLET BY MOUTH EVERY DAY 90 tablet 1   • metFORMIN (GLUCOPHAGE) 1000 MG tablet Take 1 tablet (1,000 mg total) by mouth 2 (two) times a day with meals 180 tablet 1   • mupirocin (BACTROBAN) 2 % ointment Apply topically daily (Patient not taking: Reported on 1/10/2023) 22 g 2   • Omega-3 Fatty Acids (fish oil) 1,000 mg Take 1 capsule (1,000 mg total) by mouth 2 (two) times a day 180 capsule 2   • oxyCODONE-acetaminophen (PERCOCET) 5-325 mg per tablet  (Patient not taking: Reported on 1/10/2023)     • ticagrelor (Brilinta) 90 MG Take 1 tablet (90 mg total) by mouth every 12 (twelve) hours (Patient not taking: Reported on 5/2/2022) 180 tablet 2   • zinc sulfate (ZINCATE) 220 mg capsule Take 1 capsule (220 mg total) by mouth daily 30 capsule 0     No current facility-administered medications for this visit  He is allergic to amoxicillin       Review of Systems   Constitutional: Negative  HENT: Negative for ear pain and hearing loss  Eyes: Negative for pain  Respiratory: Negative for chest tightness and shortness of breath  Cardiovascular: Positive for leg swelling  Negative for chest pain and palpitations  Gastrointestinal: Negative for diarrhea, nausea and vomiting  Genitourinary: Negative for dysuria  Musculoskeletal: Negative for gait problem  Skin: Positive for wound  Neurological: Negative for tremors and weakness  Psychiatric/Behavioral: Negative for behavioral problems, confusion and suicidal ideas  Objective:       Wound 03/07/22 Surgical Leg Left;Upper;Medial (Active)   Wound Image Images linked 02/07/23 0919   Wound Description Pink;Granulation tissue; Yellow;Slough; Epithelialization 02/07/23 0919   Sarika-wound Assessment Scar Tissue; Maceration; Induration 02/07/23 0919   Wound Length (cm) 3 cm 02/07/23 0919   Wound Width (cm) 12 cm 02/07/23 0919   Wound Depth (cm) 0 2 cm 02/07/23 0919   Wound Surface Area (cm^2) 36 cm^2 02/07/23 0919   Wound Volume (cm^3) 7 2 cm^3 02/07/23 0919   Calculated Wound Volume (cm^3) 7 2 cm^3 02/07/23 0919   Change in Wound Size % -22 45 02/07/23 0919   Undermining 0 4 02/07/23 0919   Undermining is depth extending from 7-9 clock 02/07/23 0919   Drainage Amount Large 02/07/23 0919   Drainage Description Serosanguineous 02/07/23 0919   Non-staged Wound Description Full thickness 02/07/23 0919   Dressing Status Intact 02/07/23 0919       Wound 04/11/22 Incision Abdomen Left;Medial;Lower (Active)   Wound Image Images linked 02/07/23 0925   Wound Description Granulation tissue; Epithelialization;Pink 02/07/23 0925   Sarika-wound Assessment Scar Tissue; Induration; Purple 02/07/23 0925   Wound Length (cm) 5 cm (multiple pinpoint openings measured) 02/07/23 0925   Wound Width (cm) 5 5 cm 02/07/23 0925 Wound Depth (cm) 0 1 cm 02/07/23 0925   Wound Surface Area (cm^2) 27 5 cm^2 02/07/23 0925   Wound Volume (cm^3) 2 75 cm^3 02/07/23 0925   Calculated Wound Volume (cm^3) 2 75 cm^3 02/07/23 0925   Change in Wound Size % -1618 75 02/07/23 0925   Drainage Amount Moderate 02/07/23 0925   Drainage Description Milky; Serosanguineous 02/07/23 0925   Non-staged Wound Description Full thickness 02/07/23 0925   Dressing Status Other (Comment) (no dressing) 02/07/23 0925       /80   Pulse 66   Temp (!) 96 9 °F (36 1 °C)   Resp 18                 Wound Instructions:  Orders Placed This Encounter   Procedures   • Wound cleansing and dressings     Lower abdominal wound:        Apply warm compresses to the abdominal wound 1- 2x/day until compress cools down  Wash your hands with soap and water  Remove old dressing, discard into plastic bag and place in trash  Cleanse the wound with soap and water prior to applying a clean dressing  Do not use tissue or cotton balls  Do not scrub the wound  Pat dry using gauze  Shower yes - with wounds covered  Apply Nystatin powder to abdominal skin fold  Apply Clindamycin ointment to the wounds as recommended by Dermatology  Cover with gauze and secure with medipore tape  Change dressing daily                        Dry dressing applied today in wound care       Standing Status:   Future     Standing Expiration Date:   2/7/2024   • Wound cleansing and dressings      Left Upper Left Leg wound:      Wash your hands with soap and water  Remove old dressing, discard into plastic bag and place in trash  Cleanse the wound with soap and water (cleaned with Prophase today)prior to applying a clean dressing  Do not use tissue or cotton balls  Do not scrub the wound  Pat dry using gauze  Shower yes - with wounds covered  Apply skin prep to skin surrounding wound  Apply Convamax to the wound  Secure with Medipore tape    Change dressing daily      This was done today         Standing Status:   Future     Standing Expiration Date:   2/7/2024        Diagnosis ICD-10-CM Associated Orders   1  Type 2 diabetes mellitus with diabetic polyneuropathy, without long-term current use of insulin ([facility])  E11 42 lidocaine (XYLOCAINE) 4 % topical solution 5 mL     Wound cleansing and dressings     Wound cleansing and dressings      2  History of hidradenitis suppurativa  L73 2 lidocaine (XYLOCAINE) 4 % topical solution 5 mL     Wound cleansing and dressings     Wound cleansing and dressings      3  Morbid obesity with body mass index (BMI) of 50 0 to 59 9 in adult ([facility])  E66 01 lidocaine (XYLOCAINE) 4 % topical solution 5 mL    Z68 43 Wound cleansing and dressings     Wound cleansing and dressings      4  Non-healing surgical wound, initial encounter  T81 89XA lidocaine (XYLOCAINE) 4 % topical solution 5 mL     Wound cleansing and dressings     Wound cleansing and dressings      5  Abscess of skin of abdomen  L02 211 lidocaine (XYLOCAINE) 4 % topical solution 5 mL     Wound cleansing and dressings     Wound cleansing and dressings      6  Abscess of left groin  L02 214 lidocaine (XYLOCAINE) 4 % topical solution 5 mL     Wound cleansing and dressings     Wound cleansing and dressings      7   Lymphedema of left lower extremity  I89 0 lidocaine (XYLOCAINE) 4 % topical solution 5 mL     Wound cleansing and dressings     Wound cleansing and dressings

## 2023-02-07 NOTE — PATIENT INSTRUCTIONS
Orders Placed This Encounter   Procedures    Wound cleansing and dressings     Lower abdominal wound:        Apply warm compresses to the abdominal wound 1- 2x/day until compress cools down  Wash your hands with soap and water  Remove old dressing, discard into plastic bag and place in trash  Cleanse the wound with soap and water prior to applying a clean dressing  Do not use tissue or cotton balls  Do not scrub the wound  Pat dry using gauze  Shower yes - with wounds covered  Apply Nystatin powder to abdominal skin fold  Apply Clindamycin ointment to the wounds as recommended by Dermatology  Cover with gauze and secure with medipore tape  Change dressing daily  Dry dressing applied today in wound care       Standing Status:   Future     Standing Expiration Date:   2/7/2024    Wound cleansing and dressings      Left Upper Left Leg wound:      Wash your hands with soap and water  Remove old dressing, discard into plastic bag and place in trash  Cleanse the wound with soap and water (cleaned with Prophase today)prior to applying a clean dressing  Do not use tissue or cotton balls  Do not scrub the wound  Pat dry using gauze  Shower yes - with wounds covered  Apply skin prep to skin surrounding wound  Apply Convamax to the wound  Secure with Medipore tape  Change dressing daily       This was done today             Standing Status:   Future     Standing Expiration Date:   2/7/2024

## 2023-03-07 ENCOUNTER — OFFICE VISIT (OUTPATIENT)
Dept: WOUND CARE | Facility: HOSPITAL | Age: 43
End: 2023-03-07

## 2023-03-07 ENCOUNTER — OFFICE VISIT (OUTPATIENT)
Dept: DERMATOLOGY | Facility: CLINIC | Age: 43
End: 2023-03-07

## 2023-03-07 VITALS
HEIGHT: 67 IN | WEIGHT: 315 LBS | BODY MASS INDEX: 49.44 KG/M2 | TEMPERATURE: 98.9 F | HEART RATE: 69 BPM | OXYGEN SATURATION: 94 %

## 2023-03-07 VITALS
RESPIRATION RATE: 18 BRPM | DIASTOLIC BLOOD PRESSURE: 84 MMHG | SYSTOLIC BLOOD PRESSURE: 118 MMHG | HEART RATE: 72 BPM | TEMPERATURE: 96.6 F

## 2023-03-07 DIAGNOSIS — I89.0 LYMPHEDEMA OF LEFT LOWER EXTREMITY: ICD-10-CM

## 2023-03-07 DIAGNOSIS — T81.89XA NON-HEALING SURGICAL WOUND, INITIAL ENCOUNTER: ICD-10-CM

## 2023-03-07 DIAGNOSIS — L02.211 ABSCESS OF SKIN OF ABDOMEN: ICD-10-CM

## 2023-03-07 DIAGNOSIS — L73.2 HIDRADENITIS SUPPURATIVA: Primary | ICD-10-CM

## 2023-03-07 DIAGNOSIS — E11.42 TYPE 2 DIABETES MELLITUS WITH DIABETIC POLYNEUROPATHY, WITHOUT LONG-TERM CURRENT USE OF INSULIN (HCC): ICD-10-CM

## 2023-03-07 DIAGNOSIS — E66.01 MORBID OBESITY WITH BODY MASS INDEX (BMI) OF 50.0 TO 59.9 IN ADULT (HCC): ICD-10-CM

## 2023-03-07 DIAGNOSIS — L30.4 INTERTRIGINOUS DERMATITIS ASSOCIATED WITH MOISTURE: ICD-10-CM

## 2023-03-07 RX ORDER — LIDOCAINE HYDROCHLORIDE 40 MG/ML
5 SOLUTION TOPICAL ONCE
Status: COMPLETED | OUTPATIENT
Start: 2023-03-07 | End: 2023-03-07

## 2023-03-07 RX ADMIN — LIDOCAINE HYDROCHLORIDE 5 ML: 40 SOLUTION TOPICAL at 09:00

## 2023-03-07 NOTE — PATIENT INSTRUCTIONS
Orders Placed This Encounter   Procedures    Wound cleansing and dressings     Left Upper Left Leg wound      Wash your hands with soap and water  Remove old dressing, discard into plastic bag and place in trash  Cleanse the wound with soap and water prior to applying a clean dressing  Do not use tissue or cotton balls  Do not scrub the wound  Pat dry using gauze  Shower yes - with wounds covered  Apply skin prep to skin surrounding wound  Apply Convamax to the wound  May use Maxi pads  Secure with Medipore tape  Change dressing daily  This was done today     Standing Status:   Future     Standing Expiration Date:   3/7/2024    Wound cleansing and dressings     Lower abdominal wound        Apply warm compresses to the abdominal wound 1- 2x/day until compress cools down  Wash your hands with soap and water  Remove old dressing, discard into plastic bag and place in trash  Cleanse the wound with soap and water prior to applying a clean dressing  Do not use tissue or cotton balls  Do not scrub the wound  Pat dry using gauze  Shower yes - with wounds covered  Apply Nystatin powder to abdominal skin fold  Apply Clindamycin ointment to the wounds as recommended by Dermatology  Cover with gauze and secure with medipore tape  Change dressing daily                                          Dry dressing applied today in wound care                      Standing Status:   Future     Standing Expiration Date:   3/7/2024

## 2023-03-07 NOTE — PATIENT INSTRUCTIONS
Assessment and Plan:  Based on a thorough discussion of this condition and the management approach to it (including a comprehensive discussion of the known risks, side effects and potential benefits of treatment), the patient (family) agrees to implement the following specific plan:  Discussed with the patient that the insurance denied Humira injections  According to [facility] the patient needs history of failure to at least one oral antibiotic  Recommended to continue with Clindamycin lotion and Doxycycline tablets  Finish treatment  We will continue reaching out to the insurance for approval   Recommended to get blood work done ( Hep B virus and Hepatitis C antibody and Quantifieron TB Gold Plus)

## 2023-03-07 NOTE — PROGRESS NOTES
[facility] Note     Patient Name: Nettie Quarles  Encounter Date: 3/7/2023    Have you been cared for by a [facility] D[facility]atologist in the last 3 years and, if so, which description applies to you? Yes  I have been here within the last 3 years, and my medical history has NOT changed since that time  I am MALE/not capable of bearing children  REVIEW OF SYSTEMS:  Have you recently had or currently have any of the following? · No changes in my recent health  PAST MEDICAL HISTORY:  Have you personally ever had or currently have any of the following? If "YES," then please provide more detail  · No changes in my medical history  FAMILY HISTORY:  Any "first degree relatives" (parent, brother, sister, or child) with the following? • No changes in my family's known health  PATIENT EXPERIENCE:    • Do you want the Dermatologist to perform a COMPLETE skin exam today including a clinical examination under the "bra and underwear" areas? NO  • If necessary, do we have your permission to call and leave a detailed message on your Preferred Phone number that includes your specific medical information?   Yes      Allergies   Allergen Reactions   • Amoxicillin Hives     childhood      Current Outpatient Medications:   •  amLODIPine (NORVASC) 10 mg tablet, TAKE ONE TABLET BY MOUTH EVERY DAY, Disp: 90 tablet, Rfl: 1  •  aspirin (ECOTRIN LOW STRENGTH) 81 mg EC tablet, Take 1 tablet (81 mg total) by mouth daily, Disp: 30 tablet, Rfl: 0  •  atorvastatin (LIPITOR) 80 mg tablet, Take 1 tablet (80 mg total) by mouth daily, Disp: 90 tablet, Rfl: 1  •  carvedilol (COREG) 6 25 mg tablet, Take 1 tablet (6 25 mg total) by mouth 2 (two) times a day with meals, Disp: 180 tablet, Rfl: 1  •  cetirizine (ZyrTEC) 10 mg tablet, Take 1 tablet (10 mg total) by mouth daily, Disp: 30 tablet, Rfl: 1  •  clindamycin (CLEOCIN T) 1 % lotion, Apply topically 2 (two) times a day, Disp: 60 mL, Rfl: 0  •  clindamycin (CLINDAGEL) 1 % gel, Apply topically 2 (two) times a day, Disp: 30 g, Rfl: 0  •  doxycycline monohydrate (MONODOX) 100 mg capsule, Please take 2 times daily with water and a full glass of water, Disp: 60 capsule, Rfl: 1  •  liraglutide (VICTOZA) injection, Inject 0 1 mL (0 6 mg total) under the skin daily Start at 0 6 mg daily for a week, then increase to 1 2 mg daily on the second week, then increase to 1 8 mg daily on the third week and going forward , Disp: 3 mL, Rfl: 3  •  lisinopril (ZESTRIL) 20 mg tablet, TAKE ONE TABLET BY MOUTH EVERY DAY, Disp: 90 tablet, Rfl: 1  •  metFORMIN (GLUCOPHAGE) 1000 MG tablet, Take 1 tablet (1,000 mg total) by mouth 2 (two) times a day with meals, Disp: 180 tablet, Rfl: 1  •  nystatin (MYCOSTATIN) powder, Apply topically 2 (two) times a day, Disp: 15 g, Rfl: 0  •  Omega-3 Fatty Acids (fish oil) 1,000 mg, Take 1 capsule (1,000 mg total) by mouth 2 (two) times a day, Disp: 180 capsule, Rfl: 2  •  oxyCODONE-acetaminophen (PERCOCET) 5-325 mg per tablet, , Disp: , Rfl:   •  albuterol (ProAir HFA) 90 mcg/act inhaler, Inhale 2 puffs every 6 (six) hours as needed for wheezing (Patient not taking: Reported on 1/10/2023), Disp: 8 5 g, Rfl: 3  •  EPINEPHrine (EPIPEN) 0 3 mg/0 3 mL SOAJ, Inject 0 3 mL (0 3 mg total) into a muscle once for 1 dose, Disp: 0 6 mL, Rfl: 0  •  Insulin Pen Needle (PEN NEEDLES 29GX1/2") 29G X 12MM MISC, Use 1 needle daily for subcutaneous injections (Patient not taking: Reported on 5/4/2022), Disp: 50 each, Rfl: 3  •  mupirocin (BACTROBAN) 2 % ointment, Apply topically daily (Patient not taking: Reported on 1/10/2023), Disp: 22 g, Rfl: 2  •  ticagrelor (Brilinta) 90 MG, Take 1 tablet (90 mg total) by mouth every 12 (twelve) hours (Patient not taking: Reported on 5/2/2022), Disp: 180 tablet, Rfl: 2  •  zinc sulfate (ZINCATE) 220 mg capsule, Take 1 capsule (220 mg total) by mouth daily, Disp: 30 capsule, Rfl: 0  No current facility-administered medications for this visit  • Whom besides the patient is providing clinical information about today's encounter?   o NO ADDITIONAL HISTORIAN (patient alone provided history)    Physical Exam and Assessment/Plan by Diagnosis:      HIDRADENITIS SUPPURATIVA     Physical Exam:  • Anatomic Location Affected:  Groin and lower adbomen  • Morphological Description: Salas stage 3; Pockets grater then 4 cm   • Pertinent Positives:  • Pertinent Negatives: Additional History of Present Condition:  Patient states that he has been on Doxycyline and Clindamycin lotion for 2 months now and he does not see any improvement  He still has flare up and painful lesions on the groin area  Assessment and Plan:  Based on a thorough discussion of this condition and the management approach to it (including a comprehensive discussion of the known risks, side effects and potential benefits of treatment), the patient (family) agrees to implement the following specific plan:  • Discussed with the patient that the insurance denied Humira injections  According to [facility] the patient needs history of failure to at least one oral antibiotic  • Recommended to continue with Clindamycin lotion and Doxycycline tablets  Finish treatment  We will continue reaching out to the insurance for approval   • Recommended to get blood work done ( Hep B virus and Hepatitis C antibody and Quantifieron TB Gold Plus)   Will then appeal for PA      Lisaibe Attestation    I,:  Fox Yoder am acting as a scribe while in the presence of the attending physician :       I,:  Bing Khan MD personally performed the services described in this documentation    as scribed in my presence :

## 2023-03-07 NOTE — PROGRESS NOTES
Patient ID: Cande Ashton is a 40 y o  male Date of Birth 1980     Chief Complaint  Chief Complaint   Patient presents with   • Follow Up Wound Care Visit     Left leg/groin       Allergies  Amoxicillin    Assessment:     Diagnoses and all orders for this visit:    Hidradenitis suppurativa  -     lidocaine (XYLOCAINE) 4 % topical solution 5 mL  -     Wound cleansing and dressings; Future  -     Wound cleansing and dressings; Future    Type 2 diabetes mellitus with diabetic polyneuropathy, without long-term current use of insulin ([facility])    Morbid obesity with body mass index (BMI) of 50 0 to 59 9 in adult [facility])    Non-healing surgical wound, initial encounter  -     Debridement    Abscess of skin of abdomen    Lymphedema of left lower extremity    Intertriginous dermatitis associated with moisture              Debridement   Wound 03/07/22 Surgical Leg Left;Upper;Medial    Universal Protocol:  Consent: Written consent obtained  Consent given by: patient  Time out: Immediately prior to procedure a "time out" was called to verify the correct patient, procedure, equipment, support staff and site/side marked as required  Timeout called at: 3/7/2023 10:56 AM   Patient identity confirmed: verbally with patient      Performed by: NP  Debridement type: selective  Pain control: lidocaine 4%  Pre-debridement measurements  Length (cm): 2 9  Width (cm): 11 8  Depth (cm): 0 3  Surface Area (cm^2): 34 22  Volume (cm^3): 10 27    Post-debridement measurements  Length (cm): 2 9  Width (cm): 11 8  Depth (cm): 0 3  Percent debrided: 70%  Surface Area (cm^2): 34 22  Area debrided (cm^2): 23 95  Volume (cm^3): 10 27  Devitalized tissue debrided: biofilm, fibrin and slough  Instrument(s) utilized: curette  Bleeding: small  Hemostasis obtained with: pressure  Procedural pain (0-10): 0  Post-procedural pain: 0   Response to treatment: procedure was tolerated well          Plan:  1  F/U visit  Left medial leg wound debrided  Wound measuring slightly smaller  Continue current plan of care  2   Abdominal wound unchanged  Abdomen contains large indurated area possibly due to hidradenitis  Continue clindamycin gel as recommended by dermatology  3   Patient is continue to take doxycycline as recommended by dermatology  4  MASD of abdominal fold is improving  Patient may continue to use nystatin powder as needed  5   Patient is scheduled to see dermatology later today for follow-up  Will await for input  6   Patient will follow-up in 4 weeks     Wound 03/07/22 Surgical Leg Left;Upper;Medial (Active)   Wound Image Images linked 03/07/23 0842   Wound Description Yellow;Pink;Slough; Epithelialization 03/07/23 0841   Sarika-wound Assessment Scar Tissue; Maceration; Induration;Pink 03/07/23 0841   Wound Length (cm) 2 9 cm 03/07/23 0841   Wound Width (cm) 11 8 cm 03/07/23 0841   Wound Depth (cm) 0 3 cm 03/07/23 0841   Wound Surface Area (cm^2) 34 22 cm^2 03/07/23 0841   Wound Volume (cm^3) 10 266 cm^3 03/07/23 0841   Calculated Wound Volume (cm^3) 10 27 cm^3 03/07/23 0841   Change in Wound Size % -74 66 03/07/23 0841   Undermining 0 03/07/23 0841   Drainage Amount Moderate 03/07/23 0841   Drainage Description Serosanguineous 03/07/23 0841   Non-staged Wound Description Full thickness 03/07/23 0841   Wound packed? No 03/07/23 0841   Dressing Status Intact (upon arrival) 03/07/23 0841       Wound 04/11/22 Incision Abdomen Left;Medial;Lower (Active)   Wound Image Images linked 03/07/23 0848   Wound Description Epithelialization;Pink 03/07/23 0848   Sarika-wound Assessment Scar Tissue; Induration; Purple 03/07/23 0848   Wound Length (cm) 0 1 cm 03/07/23 0848   Wound Width (cm) 0 1 cm 03/07/23 0848   Wound Depth (cm) 0 1 cm 03/07/23 0848   Wound Surface Area (cm^2) 0 01 cm^2 03/07/23 0848   Wound Volume (cm^3) 0 001 cm^3 03/07/23 0848   Calculated Wound Volume (cm^3) 0 cm^3 03/07/23 0848   Change in Wound Size % 100 03/07/23 0848   Drainage Amount Scant 03/07/23 0848   Drainage Description Clear 03/07/23 0848   Non-staged Wound Description Full thickness 03/07/23 0848   Dressing Status Other (Comment) (no dressing upon arrival) 03/07/23 0848       Wound 03/07/22 Surgical Leg Left;Upper;Medial (Active)   Date First Assessed/Time First Assessed: 03/07/22 0833   Primary Wound Type: Surgical  Location: Leg  Wound Location Orientation: Left;Upper;Medial  Wound Outcome: (c)        Wound 04/11/22 Incision Abdomen Left;Medial;Lower (Active)   Date First Assessed: 04/11/22   Primary Wound Type: Incision  Location: Abdomen  Wound Location Orientation: Left;Medial;Lower       [REMOVED] Wound 05/26/21 Incision Thigh Left (Removed)   Resolved Date: 03/07/22  Date First Assessed/Time First Assessed: 05/26/21 1914   Pre-Existing Wound: Yes  Primary Wound Type: Incision  Location: Thigh  Wound Location Orientation: Left  Wound Description (Comments): 2 incisions  Incision's 1st Dress    [REMOVED] Wound 05/26/21 Surgical Other (Comment) Groin Left (Removed)   Resolved Date: 03/07/22  Date First Assessed/Time First Assessed: 05/26/21 1916   Pre-Existing Wound: Yes  Primary Wound Type: Surgical  Traumatic Wound Type: (c) Other (Comment)  Location: Groin  Wound Location Orientation: Left  Wound Outcome: Unkno    [REMOVED] Wound 06/01/21 Other (comment) Cellulitis Pretibial Left (Removed)   Resolved Date: 03/07/22  Date First Assessed/Time First Assessed: 06/01/21 1930   Pre-Existing Wound: Yes  Primary Wound Type: Other (comment)  Traumatic Wound Type: Cellulitis  Location: Pretibial  Wound Location Orientation: Left  Dressing Status: C         [REMOVED] Wound 06/02/21 Hip Anterior;Right (Removed)   Resolved Date: 03/07/22  Date First Assessed/Time First Assessed: 06/02/21 1028   Location: Hip  Wound Location Orientation: Anterior;Right  Wound Outcome: Unknown (No longer present)       [REMOVED] Wound 06/02/21 Sacrum (Removed)   Resolved Date: 03/07/22  Date First Assessed/Time First Assessed: 06/02/21 1034   Location: Sacrum  Wound Outcome: Unknown (No longer present)       [REMOVED] Wound 07/06/21 Leg Left (Removed)   Resolved Date: 03/07/22  Date First Assessed/Time First Assessed: 07/06/21 1639   Location: Leg  Wound Location Orientation: Left  Incision's 1st Dressing: KERLIX SUPER SPONGE 6 IN (x2), ACE WRAP 6 IN UNSTERILE (x4), SPONGE GAUZE 4 X 4 16 PLY STRL FÁTIMA    [REMOVED] Wound 03/30/22 Sacrum (Removed)   Resolved Date: 04/11/22  Date First Assessed/Time First Assessed: 03/30/22 1610   Location: Sacrum  Wound Description (Comments): 4/1/22 Stage 2 POA  Wound Outcome: (c) Other (Comment)       [REMOVED] Wound 03/30/22 Surgical Thigh Anterior; Left (Removed)   Resolved Date: 04/11/22  Date First Assessed/Time First Assessed: 03/30/22 1614   Pre-Existing Wound: Yes  Primary Wound Type: Surgical  Location: Thigh  Wound Location Orientation: Anterior; Left  Wound Description (Comments): Surgical Wound  Dressing    [REMOVED] Wound 03/30/22 Abdomen Left (Removed)   Resolved Date: 04/11/22  Date First Assessed/Time First Assessed: 03/30/22 1616   Location: Abdomen  Wound Location Orientation: Left  Wound Outcome: (c) Other (Comment)       [REMOVED] Wound 03/30/22 Thigh Anterior;Right (Removed)   Resolved Date: 04/11/22  Date First Assessed/Time First Assessed: 03/30/22 1617   Location: Thigh  Wound Location Orientation: Anterior;Right  Wound Outcome: (c) Other (Comment)       [REMOVED] Wound 03/30/22 Abscess Cellulitis Thigh Anterior;Left;Proximal (Removed)   Resolved Date: 04/11/22  Date First Assessed/Time First Assessed: 03/30/22 1618   Primary Wound Type: Abscess  Traumatic Wound Type: Cellulitis  Location: Thigh  Wound Location Orientation: Anterior;Left;Proximal  Dressing Status: New drainage  Wound     [REMOVED] Wound 05/04/22 Leg Left; Inner; Lower (Removed)   Resolved Date: 06/21/22  Date First Assessed/Time First Assessed: 05/04/22 2200   Pre-Existing Wound: Yes  Location: Leg  Wound Location Orientation: Left; Inner; Lower  Wound Outcome: (c) Other (Comment)       [REMOVED] Wound 05/04/22 Other (comment) Sacrum (Removed)   Resolved Date: 06/21/22  Date First Assessed/Time First Assessed: 05/04/22 0001   Pre-Existing Wound: Yes  Primary Wound Type: Other (comment)  Location: Sacrum  Wound Description (Comments): Pt states wound is closed/healed & declined assessment 6/21    [REMOVED] Wound 08/23/22 Incision Thigh Left;Proximal;Medial (Removed)   Resolved Date: 01/10/23  Date First Assessed: 08/23/22   Primary Wound Type: Incision  Location: Thigh  Wound Location Orientation: Left;Proximal;Medial  Wound Outcome: Healed       Subjective:     F/u visit for nonhealing surgical wound of left medial thigh and abdominal wound  No new complaints  He denies any pain, fevers, or chills  Patient reports he is scheduled to see dermatology later today        The following portions of the patient's history were reviewed and updated as appropriate:   He  has a past medical history of Arthritis, Breathing difficulty, Coronary artery disease, Diabetes mellitus ([facility] [address] ), Diabetic neuropathy ([facility] [address] ) (5/28/2021), Heart disease, Hidradenitis suppurativa, History of transfusion, Hyperlipidemia, Hypertension, MI (myocardial infarction) ([facility] [address] ), Morbid obesity with BMI of 50 0-59 9, adult ([facility] [address] ), Nicotine dependence, Obesity, and Pilonidal cyst   He   Patient Active Problem List    Diagnosis Date Noted   • Lymphedema of both lower extremities 06/15/2022   • History of coronary angioplasty with insertion of stent 06/13/2022   • Colitis 05/04/2022   • Abscess of multiple sites of buttock 05/04/2022   • Anemia 04/01/2022   • Swelling of left lower extremity 03/30/2022   • History of hidradenitis suppurativa 03/30/2022   • Nocturnal hypoxia 12/04/2021   • Cellulitis of multiple sites of trunk 12/02/2021   • Chest pain 12/02/2021   • Cellulitis of left lower extremity 06/23/2021   • H/O drainage of abscess 06/21/2021   • Difficult intubation 05/28/2021   • Morbid obesity with body mass index (BMI) of 50 0 to 59 9 in adult [facility]) 03/10/2021   • Type 1 non-ST elevation myocardial infarction (NSTEMI) ([facility] ) 11/29/2020   • Left groin mass 11/29/2020   • Type 2 diabetes mellitus with diabetic polyneuropathy, without long-term current use of insulin ([place] [address] ) 07/26/2018   • Dyslipidemia 01/23/2017   • Pilonidal cyst 12/13/2016   • Essential hypertension 01/14/2016   • Abnormal liver enzymes 09/23/2014   • Coronary artery disease 06/25/2014     He  has a past surgical history that includes Leg Surgery (Left); Coronary angioplasty with stent; Incision and drainage of wound (N/A, 1/24/2017); pr exc b9 lesion mrgn xcp sk tg t/a/l >4 0 cm (Left, 4/6/2021); pr negative pressure wound therapy dme </= 50 sq cm (Left, 4/6/2021); Wound debridement (Left, 4/17/2021); Wound debridement (Left, 4/22/2021); Wound debridement (Left, 5/26/2021); Wound debridement (Left, 5/28/2021); pr debridement subcutaneous tissue 20 sq cm/< (Left, 7/6/2021); pr excision h/p/p/u complex repair (Left, 7/6/2021); and Cardiac surgery  His family history includes Diabetes in his father; Heart disease in his father and mother; Hypertension in his mother  He  reports that he quit smoking about 2 years ago  His smoking use included cigarettes  He started smoking about 2 years ago  He has a 30 00 pack-year smoking history  He has never used smokeless tobacco  He reports that he does not currently use alcohol  He reports that he does not use drugs    Current Outpatient Medications   Medication Sig Dispense Refill   • albuterol (ProAir HFA) 90 mcg/act inhaler Inhale 2 puffs every 6 (six) hours as needed for wheezing (Patient not taking: Reported on 1/10/2023) 8 5 g 3   • amLODIPine (NORVASC) 10 mg tablet TAKE ONE TABLET BY MOUTH EVERY DAY 90 tablet 1   • aspirin (ECOTRIN LOW STRENGTH) 81 mg EC tablet Take 1 tablet (81 mg total) by mouth daily 30 tablet 0   • atorvastatin (LIPITOR) 80 mg tablet Take 1 tablet (80 mg total) by mouth daily 90 tablet 1   • carvedilol (COREG) 6 25 mg tablet Take 1 tablet (6 25 mg total) by mouth 2 (two) times a day with meals 180 tablet 1   • cetirizine (ZyrTEC) 10 mg tablet Take 1 tablet (10 mg total) by mouth daily 30 tablet 1   • clindamycin (CLEOCIN T) 1 % lotion Apply topically 2 (two) times a day 60 mL 0   • clindamycin (CLINDAGEL) 1 % gel Apply topically 2 (two) times a day 30 g 0   • doxycycline monohydrate (MONODOX) 100 mg capsule Please take 2 times daily with water and a full glass of water 60 capsule 1   • EPINEPHrine (EPIPEN) 0 3 mg/0 3 mL SOAJ Inject 0 3 mL (0 3 mg total) into a muscle once for 1 dose 0 6 mL 0   • Insulin Pen Needle (PEN NEEDLES 29GX1/2") 29G X 12MM MISC Use 1 needle daily for subcutaneous injections (Patient not taking: Reported on 5/4/2022) 50 each 3   • liraglutide (VICTOZA) injection Inject 0 1 mL (0 6 mg total) under the skin daily Start at 0 6 mg daily for a week, then increase to 1 2 mg daily on the second week, then increase to 1 8 mg daily on the third week and going forward   3 mL 3   • lisinopril (ZESTRIL) 20 mg tablet TAKE ONE TABLET BY MOUTH EVERY DAY 90 tablet 1   • metFORMIN (GLUCOPHAGE) 1000 MG tablet Take 1 tablet (1,000 mg total) by mouth 2 (two) times a day with meals 180 tablet 1   • mupirocin (BACTROBAN) 2 % ointment Apply topically daily (Patient not taking: Reported on 1/10/2023) 22 g 2   • nystatin (MYCOSTATIN) powder Apply topically 2 (two) times a day 15 g 0   • Omega-3 Fatty Acids (fish oil) 1,000 mg Take 1 capsule (1,000 mg total) by mouth 2 (two) times a day 180 capsule 2   • oxyCODONE-acetaminophen (PERCOCET) 5-325 mg per tablet      • ticagrelor (Brilinta) 90 MG Take 1 tablet (90 mg total) by mouth every 12 (twelve) hours (Patient not taking: Reported on 5/2/2022) 180 tablet 2   • zinc sulfate (ZINCATE) 220 mg capsule Take 1 capsule (220 mg total) by mouth daily 30 capsule 0     No current facility-administered medications for this visit  He is allergic to amoxicillin       Review of Systems   Constitutional: Negative  HENT: Negative for ear pain and hearing loss  Eyes: Negative for pain  Respiratory: Negative for chest tightness and shortness of breath  Cardiovascular: Positive for leg swelling  Negative for chest pain and palpitations  Gastrointestinal: Negative for diarrhea, nausea and vomiting  Genitourinary: Negative for dysuria  Musculoskeletal: Negative for gait problem  Skin: Positive for wound  Neurological: Negative for tremors and weakness  Psychiatric/Behavioral: Negative for behavioral problems, confusion and suicidal ideas  Objective:       Wound 03/07/22 Surgical Leg Left;Upper;Medial (Active)   Wound Image Images linked 03/07/23 0842   Wound Description Yellow;Pink;Slough; Epithelialization 03/07/23 0841   Sarika-wound Assessment Scar Tissue; Maceration; Induration;Pink 03/07/23 0841   Wound Length (cm) 2 9 cm 03/07/23 0841   Wound Width (cm) 11 8 cm 03/07/23 0841   Wound Depth (cm) 0 3 cm 03/07/23 0841   Wound Surface Area (cm^2) 34 22 cm^2 03/07/23 0841   Wound Volume (cm^3) 10 266 cm^3 03/07/23 0841   Calculated Wound Volume (cm^3) 10 27 cm^3 03/07/23 0841   Change in Wound Size % -74 66 03/07/23 0841   Undermining 0 03/07/23 0841   Drainage Amount Moderate 03/07/23 0841   Drainage Description Serosanguineous 03/07/23 0841   Non-staged Wound Description Full thickness 03/07/23 0841   Wound packed? No 03/07/23 0841   Dressing Status Intact (upon arrival) 03/07/23 0841       Wound 04/11/22 Incision Abdomen Left;Medial;Lower (Active)   Wound Image Images linked 03/07/23 0848   Wound Description Epithelialization;Pink 03/07/23 0848   Sarika-wound Assessment Scar Tissue; Induration; Purple 03/07/23 0848   Wound Length (cm) 0 1 cm 03/07/23 0848   Wound Width (cm) 0 1 cm 03/07/23 0848   Wound Depth (cm) 0 1 cm 03/07/23 0848   Wound Surface Area (cm^2) 0 01 cm^2 03/07/23 0848   Wound Volume (cm^3) 0 001 cm^3 03/07/23 0848   Calculated Wound Volume (cm^3) 0 cm^3 03/07/23 0848   Change in Wound Size % 100 03/07/23 0848   Drainage Amount Scant 03/07/23 0848   Drainage Description Clear 03/07/23 0848   Non-staged Wound Description Full thickness 03/07/23 0848   Dressing Status Other (Comment) (no dressing upon arrival) 03/07/23 0848       /84 (BP Location: Left arm)   Pulse 72   Temp (!) 96 6 °F (35 9 °C)   Resp 18                 Wound Instructions:  Orders Placed This Encounter   Procedures   • Wound cleansing and dressings     Left Upper Left Leg wound      Wash your hands with soap and water  Remove old dressing, discard into plastic bag and place in trash  Cleanse the wound with soap and water prior to applying a clean dressing  Do not use tissue or cotton balls  Do not scrub the wound  Pat dry using gauze  Shower yes - with wounds covered  Apply skin prep to skin surrounding wound  Apply Convamax to the wound  May use Maxi pads  Secure with Medipore tape  Change dressing daily      This was done today     Standing Status:   Future     Standing Expiration Date:   3/7/2024   • Wound cleansing and dressings     Lower abdominal wound        Apply warm compresses to the abdominal wound 1- 2x/day until compress cools down  Wash your hands with soap and water  Remove old dressing, discard into plastic bag and place in trash  Cleanse the wound with soap and water prior to applying a clean dressing  Do not use tissue or cotton balls  Do not scrub the wound  Pat dry using gauze  Shower yes - with wounds covered  Apply Nystatin powder to abdominal skin fold  Apply Clindamycin ointment to the wounds as recommended by Dermatology  Cover with gauze and secure with medipore tape    Change dressing daily                                         Dry dressing applied today in wound care                      Standing Status:   Future     Standing Expiration Date:   3/7/2024   • Debridement     This order was created via procedure documentation        Diagnosis ICD-10-CM Associated Orders   1  Hidradenitis suppurativa  L73 2 lidocaine (XYLOCAINE) 4 % topical solution 5 mL     Wound cleansing and dressings     Wound cleansing and dressings      2  Type 2 diabetes mellitus with diabetic polyneuropathy, without long-term current use of insulin ([facility])  E11 42       3  Morbid obesity with body mass index (BMI) of 50 0 to 59 9 in adult ([facility] )  E66 01     Z68 43       4  Non-healing surgical wound, initial encounter  T81 89XA Debridement      5  Abscess of skin of abdomen  L02 211       6  Lymphedema of left lower extremity  I89 0       7   Intertriginous dermatitis associated with moisture  L30 4

## 2023-04-04 ENCOUNTER — APPOINTMENT (OUTPATIENT)
Dept: LAB | Facility: HOSPITAL | Age: 43
End: 2023-04-04

## 2023-04-04 ENCOUNTER — OFFICE VISIT (OUTPATIENT)
Dept: WOUND CARE | Facility: HOSPITAL | Age: 43
End: 2023-04-04

## 2023-04-04 VITALS
RESPIRATION RATE: 18 BRPM | TEMPERATURE: 96.7 F | HEART RATE: 75 BPM | DIASTOLIC BLOOD PRESSURE: 93 MMHG | SYSTOLIC BLOOD PRESSURE: 156 MMHG

## 2023-04-04 DIAGNOSIS — E78.5 DYSLIPIDEMIA: ICD-10-CM

## 2023-04-04 DIAGNOSIS — T81.89XA NON-HEALING SURGICAL WOUND, INITIAL ENCOUNTER: ICD-10-CM

## 2023-04-04 DIAGNOSIS — E11.42 TYPE 2 DIABETES MELLITUS WITH DIABETIC POLYNEUROPATHY, WITHOUT LONG-TERM CURRENT USE OF INSULIN (HCC): ICD-10-CM

## 2023-04-04 DIAGNOSIS — L73.2 HIDRADENITIS SUPPURATIVA: ICD-10-CM

## 2023-04-04 DIAGNOSIS — E66.01 MORBID OBESITY WITH BODY MASS INDEX (BMI) OF 50.0 TO 59.9 IN ADULT (HCC): ICD-10-CM

## 2023-04-04 DIAGNOSIS — L73.2 HIDRADENITIS SUPPURATIVA: Primary | ICD-10-CM

## 2023-04-04 DIAGNOSIS — I10 ESSENTIAL HYPERTENSION: ICD-10-CM

## 2023-04-04 DIAGNOSIS — L02.211 ABSCESS OF SKIN OF ABDOMEN: ICD-10-CM

## 2023-04-04 DIAGNOSIS — I89.0 LYMPHEDEMA OF LEFT LOWER EXTREMITY: ICD-10-CM

## 2023-04-04 LAB
ALBUMIN SERPL BCP-MCNC: 3.6 G/DL (ref 3.5–5)
ALP SERPL-CCNC: 87 U/L (ref 34–104)
ALT SERPL W P-5'-P-CCNC: 16 U/L (ref 7–52)
ANION GAP SERPL CALCULATED.3IONS-SCNC: 5 MMOL/L (ref 4–13)
AST SERPL W P-5'-P-CCNC: 13 U/L (ref 13–39)
BILIRUB SERPL-MCNC: 0.3 MG/DL (ref 0.2–1)
BUN SERPL-MCNC: 14 MG/DL (ref 5–25)
CALCIUM SERPL-MCNC: 9.4 MG/DL (ref 8.4–10.2)
CHLORIDE SERPL-SCNC: 97 MMOL/L (ref 96–108)
CHOLEST SERPL-MCNC: 141 MG/DL
CO2 SERPL-SCNC: 32 MMOL/L (ref 21–32)
CREAT SERPL-MCNC: 0.92 MG/DL (ref 0.6–1.3)
GFR SERPL CREATININE-BSD FRML MDRD: 102 ML/MIN/1.73SQ M
GLUCOSE P FAST SERPL-MCNC: 374 MG/DL (ref 65–99)
HBV CORE AB SER QL: NORMAL
HBV SURFACE AB SER-ACNC: <3 MIU/ML
HBV SURFACE AG SER QL: NORMAL
HCV AB SER QL: NORMAL
HDLC SERPL-MCNC: 31 MG/DL
LDLC SERPL CALC-MCNC: 84 MG/DL (ref 0–100)
POTASSIUM SERPL-SCNC: 4.6 MMOL/L (ref 3.5–5.3)
PROT SERPL-MCNC: 7.8 G/DL (ref 6.4–8.4)
SODIUM SERPL-SCNC: 134 MMOL/L (ref 135–147)
TRIGL SERPL-MCNC: 131 MG/DL

## 2023-04-04 RX ORDER — LIDOCAINE HYDROCHLORIDE 40 MG/ML
5 SOLUTION TOPICAL ONCE
Status: COMPLETED | OUTPATIENT
Start: 2023-04-04 | End: 2023-04-04

## 2023-04-04 RX ADMIN — LIDOCAINE HYDROCHLORIDE 5 ML: 40 SOLUTION TOPICAL at 08:47

## 2023-04-04 NOTE — PATIENT INSTRUCTIONS
Orders Placed This Encounter   Procedures    Wound cleansing and dressings     Left Upper Left Leg wound      Wash your hands with soap and water  Remove old dressing, discard into plastic bag and place in trash  Cleanse the wound with soap and water prior to applying a clean dressing  Do not use tissue or cotton balls  Do not scrub the wound  Pat dry using gauze  Shower yes - with wounds covered  Apply skin prep to skin surrounding wound  Apply Convamax to the wound  May use Maxi pads  Secure with Medipore tape  Change dressing daily  This was done today           Standing Status:   Future     Standing Expiration Date:   4/4/2024    Wound cleansing and dressings     Lower abdominal wound        Apply warm compresses to the abdominal wound 1- 2x/day until compress cools down  Wash your hands with soap and water  Remove old dressing, discard into plastic bag and place in trash  Cleanse the wound with soap and water prior to applying a clean dressing  Do not use tissue or cotton balls  Do not scrub the wound  Pat dry using gauze  Shower yes - with wounds covered  Apply Nystatin powder to abdominal skin fold  Apply Clindamycin ointment to the wounds as recommended by Dermatology  Cover with gauze and secure with medipore tape  Change dressing daily                                          Dry dressing applied today in wound care                 Standing Status:   Future     Standing Expiration Date:   4/4/2024

## 2023-04-04 NOTE — PROGRESS NOTES
"Patient ID: Yuliya Ellis is a 40 y o  male Date of Birth 1980     Chief Complaint  Chief Complaint   Patient presents with   • Follow Up Wound Care Visit       Allergies  Amoxicillin    Assessment:     Diagnoses and all orders for this visit:    Hidradenitis suppurativa  -     lidocaine (XYLOCAINE) 4 % topical solution 5 mL  -     Wound cleansing and dressings; Future  -     Wound cleansing and dressings; Future    Type 2 diabetes mellitus with diabetic polyneuropathy, without long-term current use of insulin ([facility])    Morbid obesity with body mass index (BMI) of 50 0 to 59 9 in [facility])    Non-healing surgical wound, initial encounter    Abscess of skin of abdomen    Lymphedema of left lower extremity    Other orders  -     Debridement              Debridement   Wound 03/07/22 Surgical Leg Left;Upper;Medial    Universal Protocol:  Consent: Written consent obtained  Consent given by: patient  Time out: Immediately prior to procedure a \"time out\" was called to verify the correct patient, procedure, equipment, support staff and site/side marked as required  Timeout called at: 4/4/2023 10:30 AM   Patient identity confirmed: verbally with patient      Performed by: NP  Debridement type: selective  Pain control: lidocaine 4%  Pre-debridement measurements  Length (cm): 2 1  Width (cm): 11 1  Depth (cm): 0 4  Surface Area (cm^2): 23 31  Volume (cm^3): 9 32    Post-debridement measurements  Length (cm): 2 1  Width (cm): 1 1  Depth (cm): 0 4  Percent debrided: 100%  Surface Area (cm^2): 2 31  Area debrided (cm^2): 2 31  Volume (cm^3): 0 92  Devitalized tissue debrided: biofilm, fibrin and slough  Instrument(s) utilized: curette  Bleeding: small  Hemostasis obtained with: pressure  Procedural pain (0-10): 0  Post-procedural pain: 0   Response to treatment: procedure was tolerated well          Plan:  1  F/U visit  Left medial thigh wound debrided  Wound measuring slightly smaller    Continue current plan of " care   2   Abdominal wound cleansed with normal saline and gauze  Wound measuring slightly larger due to patient developing a new abscess that previously burst and drained  Continue clindamycin ointment as prescribed by dermatology  3   Counseled patient not to submerge his wounds in water  Counseled patient by doing this he is putting himself at risk for infection  Counseled patient to use warm compresses only on his abdomen when he thinks he is developing a new abscess  Patient verbalized agreement and understanding  4   Counseled patient if he notices any increased pain, fevers, chills, erythema he is to be seen in the ED immediately  Patient verbalized agreement and understanding  5   Patient waiting for insurance approval for Humira injections  6  Continue doxycycline as prescribed by dermatology  7   Patient will follow-up in 4 weeks     Wound 03/07/22 Surgical Leg Left;Upper;Medial (Active)   Wound Image Images linked 04/04/23 0843   Wound Description Yellow;Pink;Slough; Epithelialization 04/04/23 0842   Sarika-wound Assessment Scar Tissue; Maceration; Induration;Pink 04/04/23 0842   Wound Length (cm) 2 1 cm 04/04/23 0842   Wound Width (cm) 11 1 cm 04/04/23 0842   Wound Depth (cm) 0 4 cm 04/04/23 0842   Wound Surface Area (cm^2) 23 31 cm^2 04/04/23 0842   Wound Volume (cm^3) 9 324 cm^3 04/04/23 0842   Calculated Wound Volume (cm^3) 9 32 cm^3 04/04/23 0842   Change in Wound Size % -58 5 04/04/23 0842   Drainage Amount Moderate 04/04/23 0842   Drainage Description Serosanguineous 04/04/23 0842   Non-staged Wound Description Full thickness 04/04/23 0842   Dressing Status Intact 04/04/23 0842       Wound 04/11/22 Incision Abdomen Left;Medial;Lower (Active)   Wound Image Images linked 04/04/23 0840   Wound Description Brown;Eschar;Epithelialization;Pink 04/04/23 0839   Sarika-wound Assessment Scar Tissue; Induration; Purple 04/04/23 0839   Wound Length (cm) 7 1 cm 04/04/23 0839   Wound Width (cm) 0 5 cm 04/04/23 6396   Wound Depth (cm) 0 cm 04/04/23 0839   Wound Surface Area (cm^2) 3 55 cm^2 04/04/23 0839   Wound Volume (cm^3) 0 cm^3 04/04/23 0839   Calculated Wound Volume (cm^3) 0 cm^3 04/04/23 0839   Change in Wound Size % 100 04/04/23 0839   Drainage Amount Small 04/04/23 0839   Drainage Description Serous 04/04/23 0839   Non-staged Wound Description Full thickness 04/04/23 0839   Dressing Status -- (Open to air) 04/04/23 0839       Wound 03/07/22 Surgical Leg Left;Upper;Medial (Active)   Date First Assessed/Time First Assessed: 03/07/22 0833   Primary Wound Type: Surgical  Location: Leg  Wound Location Orientation: Left;Upper;Medial  Wound Outcome: (c)        Wound 04/11/22 Incision Abdomen Left;Medial;Lower (Active)   Date First Assessed: 04/11/22   Primary Wound Type: Incision  Location: Abdomen  Wound Location Orientation: Left;Medial;Lower       [REMOVED] Wound 05/26/21 Incision Thigh Left (Removed)   Resolved Date: 03/07/22  Date First Assessed/Time First Assessed: 05/26/21 1914   Pre-Existing Wound: Yes  Primary Wound Type: Incision  Location: Thigh  Wound Location Orientation: Left  Wound Description (Comments): 2 incisions  Incision's 1st Dress    [REMOVED] Wound 05/26/21 Surgical Other (Comment) Groin Left (Removed)   Resolved Date: 03/07/22  Date First Assessed/Time First Assessed: 05/26/21 1916   Pre-Existing Wound: Yes  Primary Wound Type: Surgical  Traumatic Wound Type: (c) Other (Comment)  Location: Groin  Wound Location Orientation: Left  Wound Outcome: Unkno    [REMOVED] Wound 06/01/21 Other (comment) Cellulitis Pretibial Left (Removed)   Resolved Date: 03/07/22  Date First Assessed/Time First Assessed: 06/01/21 1930   Pre-Existing Wound: Yes  Primary Wound Type: Other (comment)  Traumatic Wound Type: Cellulitis  Location: Pretibial  Wound Location Orientation: Left  Dressing Status: C         [REMOVED] Wound 06/02/21 Hip Anterior;Right (Removed)   Resolved Date: 03/07/22  Date First Assessed/Time First Assessed: 06/02/21 1028   Location: Hip  Wound Location Orientation: Anterior;Right  Wound Outcome: Unknown (No longer present)       [REMOVED] Wound 06/02/21 Sacrum (Removed)   Resolved Date: 03/07/22  Date First Assessed/Time First Assessed: 06/02/21 1034   Location: Sacrum  Wound Outcome: Unknown (No longer present)       [REMOVED] Wound 07/06/21 Leg Left (Removed)   Resolved Date: 03/07/22  Date First Assessed/Time First Assessed: 07/06/21 1639   Location: Leg  Wound Location Orientation: Left  Incision's 1st Dressing: KERLIX SUPER SPONGE 6 IN (x2), ACE WRAP 6 IN UNSTERILE (x4), SPONGE GAUZE 4 X 4 16 PLY STRL FÁTIMA    [REMOVED] Wound 03/30/22 Sacrum (Removed)   Resolved Date: 04/11/22  Date First Assessed/Time First Assessed: 03/30/22 1610   Location: Sacrum  Wound Description (Comments): 4/1/22 Stage 2 POA  Wound Outcome: (c) Other (Comment)       [REMOVED] Wound 03/30/22 Surgical Thigh Anterior; Left (Removed)   Resolved Date: 04/11/22  Date First Assessed/Time First Assessed: 03/30/22 1614   Pre-Existing Wound: Yes  Primary Wound Type: Surgical  Location: Thigh  Wound Location Orientation: Anterior; Left  Wound Description (Comments): Surgical Wound  Dressing           [REMOVED] Wound 03/30/22 Abdomen Left (Removed)   Resolved Date: 04/11/22  Date First Assessed/Time First Assessed: 03/30/22 1616   Location: Abdomen  Wound Location Orientation: Left  Wound Outcome: (c) Other (Comment)       [REMOVED] Wound 03/30/22 Thigh Anterior;Right (Removed)   Resolved Date: 04/11/22  Date First Assessed/Time First Assessed: 03/30/22 1617   Location: Thigh  Wound Location Orientation: Anterior;Right  Wound Outcome: (c) Other (Comment)       [REMOVED] Wound 03/30/22 Abscess Cellulitis Thigh Anterior;Left;Proximal (Removed)   Resolved Date: 04/11/22  Date First Assessed/Time First Assessed: 03/30/22 1618   Primary Wound Type: Abscess  Traumatic Wound Type: Cellulitis  Location: Thigh  Wound Location Orientation: Anterior;Left;Proximal  Dressing Status: New drainage  Wound     [REMOVED] Wound 05/04/22 Leg Left; Inner; Lower (Removed)   Resolved Date: 06/21/22  Date First Assessed/Time First Assessed: 05/04/22 2200   Pre-Existing Wound: Yes  Location: Leg  Wound Location Orientation: Left; Inner; Lower  Wound Outcome: (c) Other (Comment)       [REMOVED] Wound 05/04/22 Other (comment) Sacrum (Removed)   Resolved Date: 06/21/22  Date First Assessed/Time First Assessed: 05/04/22 0001   Pre-Existing Wound: Yes  Primary Wound Type: Other (comment)  Location: Sacrum  Wound Description (Comments): Pt states wound is closed/healed & declined assessment 6/21    [REMOVED] Wound 08/23/22 Incision Thigh Left;Proximal;Medial (Removed)   Resolved Date: 01/10/23  Date First Assessed: 08/23/22   Primary Wound Type: Incision  Location: Thigh  Wound Location Orientation: Left;Proximal;Medial  Wound Outcome: Healed       Subjective:     F/u visit for nonhealing surgical wound of left medial thigh and abdominal wound secondary to hydradenitis  Per patient's chart, patient was last seen by dermatology on 3/7  Per dermatology's note he is to continue to take doxycycline p o  and use clindamycin ointment on his abdomen  Patient's insurance denied Humira injections, dermatology is currently working to appeal this to get patient approved for Humira injections  Patient reports a week after he was seen by dermatology he noticed his abdomen had increased erythema and induration  He also had increased pain, fevers, and chills  Patient reports he soaked in warm water in his tub at home which allowed his indurated area to drain spontaneously  Patient reports he felt better after the area drained  He has been taking the doxycycline and using the clindamycin lotion as prescribed, however he feels neither medication has really helped much    Patient also reports he tried using warm compresses to his abdomen but he also felt that did not really help much  Patient currently denies any pain, fevers, or chills  The following portions of the patient's history were reviewed and updated as appropriate:   He  has a past medical history of Arthritis, Breathing difficulty, Coronary artery disease, Diabetes mellitus ([place] [id number] ), Diabetic neuropathy ([place] [address] ) (5/28/2021), Heart disease, Hidradenitis suppurativa, History of transfusion, Hyperlipidemia, Hypertension, MI (myocardial infarction) ([place] [address] ), Morbid obesity with BMI of 50 0-59 9, adult ([place] [address] ), Nicotine dependence, Obesity, and Pilonidal cyst   He   Patient Active Problem List    Diagnosis Date Noted   • Lymphedema of both lower extremities 06/15/2022   • History of coronary angioplasty with insertion of stent 06/13/2022   • Colitis 05/04/2022   • Abscess of multiple sites of buttock 05/04/2022   • Anemia 04/01/2022   • Swelling of left lower extremity 03/30/2022   • History of hidradenitis suppurativa 03/30/2022   • Nocturnal hypoxia 12/04/2021   • Cellulitis of multiple sites of trunk 12/02/2021   • Chest pain 12/02/2021   • Cellulitis of left lower extremity 06/23/2021   • H/O drainage of abscess 06/21/2021   • Difficult intubation 05/28/2021   • Morbid obesity with body mass index (BMI) of 50 0 to 59 9 in adult [facility]) 03/10/2021   • Type 1 non-ST elevation myocardial infarction (NSTEMI) ([place] [address] ) 11/29/2020   • Left groin mass 11/29/2020   • Type 2 diabetes mellitus with diabetic polyneuropathy, without long-term current use of insulin ([place] [address] ) 07/26/2018   • Dyslipidemia 01/23/2017   • Pilonidal cyst 12/13/2016   • Essential hypertension 01/14/2016   • Abnormal liver enzymes 09/23/2014   • Coronary artery disease 06/25/2014     He  has a past surgical history that includes Leg Surgery (Left); Coronary angioplasty with stent;  Incision and drainage of wound (N/A, 1/24/2017); pr exc b9 lesion mrgn xcp sk tg t/a/l >4 0 cm (Left, 4/6/2021); pr negative pressure wound therapy dme </= 50 sq cm (Left, 4/6/2021); "Wound debridement (Left, 4/17/2021); Wound debridement (Left, 4/22/2021); Wound debridement (Left, 5/26/2021); Wound debridement (Left, 5/28/2021); pr debridement subcutaneous tissue 20 sq cm/< (Left, 7/6/2021); pr excision h/p/p/u complex repair (Left, 7/6/2021); and Cardiac surgery  His family history includes Diabetes in his father; Heart disease in his father and mother; Hypertension in his mother  He  reports that he quit smoking about 2 years ago  His smoking use included cigarettes  He started smoking about 2 years ago  He has a 30 00 pack-year smoking history  He has never used smokeless tobacco  He reports that he does not currently use alcohol  He reports that he does not use drugs    Current Outpatient Medications   Medication Sig Dispense Refill   • albuterol (ProAir HFA) 90 mcg/act inhaler Inhale 2 puffs every 6 (six) hours as needed for wheezing (Patient not taking: Reported on 1/10/2023) 8 5 g 3   • amLODIPine (NORVASC) 10 mg tablet TAKE ONE TABLET BY MOUTH EVERY DAY 90 tablet 1   • aspirin (ECOTRIN LOW STRENGTH) 81 mg EC tablet Take 1 tablet (81 mg total) by mouth daily 30 tablet 0   • atorvastatin (LIPITOR) 80 mg tablet Take 1 tablet (80 mg total) by mouth daily 90 tablet 1   • carvedilol (COREG) 6 25 mg tablet Take 1 tablet (6 25 mg total) by mouth 2 (two) times a day with meals 180 tablet 1   • cetirizine (ZyrTEC) 10 mg tablet Take 1 tablet (10 mg total) by mouth daily 30 tablet 1   • clindamycin (CLEOCIN T) 1 % lotion Apply topically 2 (two) times a day 60 mL 0   • clindamycin (CLINDAGEL) 1 % gel Apply topically 2 (two) times a day 30 g 0   • doxycycline monohydrate (MONODOX) 100 mg capsule Please take 2 times daily with water and a full glass of water 60 capsule 1   • EPINEPHrine (EPIPEN) 0 3 mg/0 3 mL SOAJ Inject 0 3 mL (0 3 mg total) into a muscle once for 1 dose 0 6 mL 0   • Insulin Pen Needle (PEN NEEDLES 29GX1/2\") 29G X 12MM MISC Use 1 needle daily for subcutaneous injections (Patient not " taking: Reported on 5/4/2022) 50 each 3   • liraglutide (VICTOZA) injection Inject 0 1 mL (0 6 mg total) under the skin daily Start at 0 6 mg daily for a week, then increase to 1 2 mg daily on the second week, then increase to 1 8 mg daily on the third week and going forward  3 mL 3   • lisinopril (ZESTRIL) 20 mg tablet TAKE ONE TABLET BY MOUTH EVERY DAY 90 tablet 1   • metFORMIN (GLUCOPHAGE) 1000 MG tablet Take 1 tablet (1,000 mg total) by mouth 2 (two) times a day with meals 180 tablet 1   • mupirocin (BACTROBAN) 2 % ointment Apply topically daily (Patient not taking: Reported on 1/10/2023) 22 g 2   • nystatin (MYCOSTATIN) powder Apply topically 2 (two) times a day 15 g 0   • Omega-3 Fatty Acids (fish oil) 1,000 mg Take 1 capsule (1,000 mg total) by mouth 2 (two) times a day 180 capsule 2   • oxyCODONE-acetaminophen (PERCOCET) 5-325 mg per tablet      • ticagrelor (Brilinta) 90 MG Take 1 tablet (90 mg total) by mouth every 12 (twelve) hours (Patient not taking: Reported on 5/2/2022) 180 tablet 2   • zinc sulfate (ZINCATE) 220 mg capsule Take 1 capsule (220 mg total) by mouth daily 30 capsule 0     No current facility-administered medications for this visit  He is allergic to amoxicillin       Review of Systems   Constitutional: Negative  HENT: Negative for ear pain and hearing loss  Eyes: Negative for pain  Respiratory: Negative for chest tightness and shortness of breath  Cardiovascular: Positive for leg swelling  Negative for chest pain and palpitations  Gastrointestinal: Negative for diarrhea, nausea and vomiting  Genitourinary: Negative for dysuria  Musculoskeletal: Negative for gait problem  Skin: Positive for wound  Neurological: Negative for tremors and weakness  Psychiatric/Behavioral: Negative for behavioral problems, confusion and suicidal ideas           Objective:       Wound 03/07/22 Surgical Leg Left;Upper;Medial (Active)   Wound Image Images linked 04/04/23 7930   Wound Description Yellow;Pink;Slough; Epithelialization 04/04/23 0842   Sarika-wound Assessment Scar Tissue; Maceration; Induration;Pink 04/04/23 0842   Wound Length (cm) 2 1 cm 04/04/23 0842   Wound Width (cm) 11 1 cm 04/04/23 0842   Wound Depth (cm) 0 4 cm 04/04/23 0842   Wound Surface Area (cm^2) 23 31 cm^2 04/04/23 0842   Wound Volume (cm^3) 9 324 cm^3 04/04/23 0842   Calculated Wound Volume (cm^3) 9 32 cm^3 04/04/23 0842   Change in Wound Size % -58 5 04/04/23 0842   Drainage Amount Moderate 04/04/23 0842   Drainage Description Serosanguineous 04/04/23 0842   Non-staged Wound Description Full thickness 04/04/23 0842   Dressing Status Intact 04/04/23 0842       Wound 04/11/22 Incision Abdomen Left;Medial;Lower (Active)   Wound Image Images linked 04/04/23 0840   Wound Description Brown;Eschar;Epithelialization;Pink 04/04/23 0839   Sarika-wound Assessment Scar Tissue; Induration; Purple 04/04/23 0839   Wound Length (cm) 7 1 cm 04/04/23 0839   Wound Width (cm) 0 5 cm 04/04/23 0839   Wound Depth (cm) 0 cm 04/04/23 0839   Wound Surface Area (cm^2) 3 55 cm^2 04/04/23 0839   Wound Volume (cm^3) 0 cm^3 04/04/23 0839   Calculated Wound Volume (cm^3) 0 cm^3 04/04/23 0839   Change in Wound Size % 100 04/04/23 0839   Drainage Amount Small 04/04/23 0839   Drainage Description Serous 04/04/23 0839   Non-staged Wound Description Full thickness 04/04/23 0839   Dressing Status -- (Open to air) 04/04/23 0839       /93   Pulse 75   Temp (!) 96 7 °F (35 9 °C)   Resp 18                 Wound Instructions:  Orders Placed This Encounter   Procedures   • Wound cleansing and dressings     Left Upper Left Leg wound      Wash your hands with soap and water  Remove old dressing, discard into plastic bag and place in trash  Cleanse the wound with soap and water prior to applying a clean dressing  Do not use tissue or cotton balls  Do not scrub the wound  Pat dry using gauze    Shower yes - with wounds covered  Apply skin prep to skin surrounding wound  Apply Convamax to the wound  May use Maxi pads  Secure with Medipore tape  Change dressing daily      This was done today           Standing Status:   Future     Standing Expiration Date:   4/4/2024   • Wound cleansing and dressings     Lower abdominal wound        Apply warm compresses to the abdominal wound 1- 2x/day until compress cools down  Wash your hands with soap and water  Remove old dressing, discard into plastic bag and place in trash  Cleanse the wound with soap and water prior to applying a clean dressing  Do not use tissue or cotton balls  Do not scrub the wound  Pat dry using gauze  Shower yes - with wounds covered  Apply Nystatin powder to abdominal skin fold  Apply Clindamycin ointment to the wounds as recommended by Dermatology  Cover with gauze and secure with medipore tape  Change dressing daily                                         Dry dressing applied today in wound care                 Standing Status:   Future     Standing Expiration Date:   4/4/2024   • Debridement     This order was created via procedure documentation        Diagnosis ICD-10-CM Associated Orders   1  Hidradenitis suppurativa  L73 2 lidocaine (XYLOCAINE) 4 % topical solution 5 mL     Wound cleansing and dressings     Wound cleansing and dressings      2  Type 2 diabetes mellitus with diabetic polyneuropathy, without long-term current use of insulin ([facility])  E11 42       3  Morbid obesity with body mass index (BMI) of 50 0 to 59 9 in adult [facility] )  E66 01     Z68 43       4  Non-healing surgical wound, initial encounter  T81 89XA       5  Abscess of skin of abdomen  L02 211       6   Lymphedema of left lower extremity  I89 0

## 2023-04-06 LAB
GAMMA INTERFERON BACKGROUND BLD IA-ACNC: 0.04 IU/ML
M TB IFN-G BLD-IMP: NEGATIVE
M TB IFN-G CD4+ BCKGRND COR BLD-ACNC: -0.01 IU/ML
M TB IFN-G CD4+ BCKGRND COR BLD-ACNC: -0.01 IU/ML
MITOGEN IGNF BCKGRD COR BLD-ACNC: 9.81 IU/ML

## 2023-04-07 ENCOUNTER — TELEPHONE (OUTPATIENT)
Dept: FAMILY MEDICINE CLINIC | Facility: CLINIC | Age: 43
End: 2023-04-07

## 2023-04-07 NOTE — TELEPHONE ENCOUNTER
I called patient  He is not taking his Victoza because he has some infections in his abdomen and this may explain why his blood sugar is out of control  I coordinated a visit with Dr Estes today at 1:40 PM that [facility] scheduled

## 2023-05-02 ENCOUNTER — OFFICE VISIT (OUTPATIENT)
Dept: WOUND CARE | Facility: HOSPITAL | Age: 43
End: 2023-05-02

## 2023-05-02 VITALS
RESPIRATION RATE: 20 BRPM | HEART RATE: 83 BPM | SYSTOLIC BLOOD PRESSURE: 118 MMHG | DIASTOLIC BLOOD PRESSURE: 68 MMHG | TEMPERATURE: 97.7 F

## 2023-05-02 DIAGNOSIS — E66.01 MORBID OBESITY WITH BODY MASS INDEX (BMI) OF 50.0 TO 59.9 IN ADULT (HCC): ICD-10-CM

## 2023-05-02 DIAGNOSIS — I89.0 LYMPHEDEMA OF BOTH LOWER EXTREMITIES: ICD-10-CM

## 2023-05-02 DIAGNOSIS — T81.89XA NON-HEALING SURGICAL WOUND, INITIAL ENCOUNTER: ICD-10-CM

## 2023-05-02 DIAGNOSIS — E11.42 TYPE 2 DIABETES MELLITUS WITH DIABETIC POLYNEUROPATHY, WITHOUT LONG-TERM CURRENT USE OF INSULIN (HCC): ICD-10-CM

## 2023-05-02 DIAGNOSIS — L73.2 HIDRADENITIS SUPPURATIVA: Primary | ICD-10-CM

## 2023-05-02 PROBLEM — Z79.61 LONG TERM (CURRENT) USE OF IMMUNOMODULATOR: Status: ACTIVE | Noted: 2023-05-02

## 2023-05-02 PROBLEM — R07.9 CHEST PAIN: Status: RESOLVED | Noted: 2021-12-02 | Resolved: 2023-05-02

## 2023-05-02 PROBLEM — L03.319: Status: RESOLVED | Noted: 2021-12-02 | Resolved: 2023-05-02

## 2023-05-02 PROBLEM — M79.89 SWELLING OF LEFT LOWER EXTREMITY: Status: RESOLVED | Noted: 2022-03-30 | Resolved: 2023-05-02

## 2023-05-02 PROBLEM — L03.116 CELLULITIS OF LEFT LOWER EXTREMITY: Status: RESOLVED | Noted: 2021-06-23 | Resolved: 2023-05-02

## 2023-05-02 RX ORDER — LIDOCAINE HYDROCHLORIDE 40 MG/ML
5 SOLUTION TOPICAL ONCE
Status: COMPLETED | OUTPATIENT
Start: 2023-05-02 | End: 2023-05-02

## 2023-05-02 RX ADMIN — LIDOCAINE HYDROCHLORIDE 5 ML: 40 SOLUTION TOPICAL at 08:37

## 2023-05-02 NOTE — PATIENT INSTRUCTIONS
Orders Placed This Encounter   Procedures    Wound cleansing and dressings     Left thigh and Lower abdominal wound           Apply warm compresses to the abdominal and thigh wound 1- 2x/day until compress cools down  Wash your hands with soap and water  Remove old dressing, discard into plastic bag and place in trash  Cleanse the wound with soap and water prior to applying a clean dressing  Do not use tissue or cotton balls  Do not scrub the wound  Pat dry using gauze  Shower yes - with wounds covered   Apply Nystatin powder to abdominal skin fold   Apply Clindamycin ointment to the wounds as recommended by Dermatology  Cover with gauze and secure with medipore tape  Change dressing daily                                            Dry dressing applied today in wound care                 Standing Status:   Future     Standing Expiration Date:   5/2/2024

## 2023-06-01 ENCOUNTER — OFFICE VISIT (OUTPATIENT)
Dept: WOUND CARE | Facility: HOSPITAL | Age: 43
End: 2023-06-01

## 2023-06-01 VITALS
TEMPERATURE: 97.1 F | HEART RATE: 71 BPM | DIASTOLIC BLOOD PRESSURE: 83 MMHG | RESPIRATION RATE: 15 BRPM | SYSTOLIC BLOOD PRESSURE: 139 MMHG

## 2023-06-01 DIAGNOSIS — E66.01 MORBID OBESITY WITH BODY MASS INDEX (BMI) OF 50.0 TO 59.9 IN ADULT (HCC): ICD-10-CM

## 2023-06-01 DIAGNOSIS — L30.4 INTERTRIGINOUS DERMATITIS ASSOCIATED WITH MOISTURE: ICD-10-CM

## 2023-06-01 DIAGNOSIS — T81.89XA NON-HEALING SURGICAL WOUND, INITIAL ENCOUNTER: Primary | ICD-10-CM

## 2023-06-01 DIAGNOSIS — L73.2 HIDRADENITIS SUPPURATIVA: ICD-10-CM

## 2023-06-01 RX ORDER — LIDOCAINE HYDROCHLORIDE 40 MG/ML
5 SOLUTION TOPICAL ONCE
Status: COMPLETED | OUTPATIENT
Start: 2023-06-01 | End: 2023-06-01

## 2023-06-01 RX ADMIN — LIDOCAINE HYDROCHLORIDE 5 ML: 40 SOLUTION TOPICAL at 09:05

## 2023-06-01 NOTE — PATIENT INSTRUCTIONS
Orders Placed This Encounter   Procedures    Wound cleansing and dressings     Left Medial Thigh Wound (Surgical Wound)    Wash your hands with soap and water  Remove old dressing, discard into plastic bag and place in trash  Cleanse the wound with soap and water  prior to applying a clean dressing  Do not use tissue or cotton balls  Do not scrub the wound  Pat dry using gauze  Shower yes with wounds covered  Apply skin prep to skin surrounding wound  Apply silver alginate to the left thigh wound  Cover with ABD  Secure with tape  Change dressing three times per week  This was done today  Standing Status:   Future     Standing Expiration Date:   6/1/2024    Wound cleansing and dressings     Abdominal wounds, Left Anterior Leg Wound, Right Thigh Wounds:          Apply warm compresses to the abdominal and thigh wound 1- 2x/day until compress cools down  Wash your hands with soap and water  Remove old dressing, discard into plastic bag and place in trash  Cleanse the wound with soap and water prior to applying a clean dressing  Do not use tissue or cotton balls  Do not scrub the wound  Pat dry using gauze  Shower yes - with wounds covered   Apply Nystatin powder to abdominal skin fold   Apply Clindamycin ointment to the wounds as recommended by Dermatology  Then apply Adaptic gauze to wounds  Cover with gauze and secure with medipore tape  Change dressing daily       This was done today  (Clindamycin ointment was not applied)                                                     Standing Status:   Future     Standing Expiration Date:   6/1/2024

## 2023-06-01 NOTE — PROGRESS NOTES
Patient ID: Ashely Grijalva is a 44 y o  male Date of Birth 1980     Chief Complaint  Chief Complaint   Patient presents with   • Follow Up Wound Care Visit     Abdomen, left leg       Allergies  Amoxicillin    Assessment:     Diagnoses and all orders for this visit:    Non-healing surgical wound, initial encounter  -     lidocaine (XYLOCAINE) 4 % topical solution 5 mL  -     Cancel: Wound cleansing and dressings; Future  -     Wound cleansing and dressings; Future  -     Wound cleansing and dressings; Future  -     Debridement    Hidradenitis suppurativa  -     lidocaine (XYLOCAINE) 4 % topical solution 5 mL  -     Cancel: Wound cleansing and dressings; Future  -     Wound cleansing and dressings; Future  -     Wound cleansing and dressings; Future    Morbid obesity with body mass index (BMI) of 50 0 to 59 9 in adult (HCC)  -     lidocaine (XYLOCAINE) 4 % topical solution 5 mL  -     Cancel: Wound cleansing and dressings; Future  -     Wound cleansing and dressings; Future  -     Wound cleansing and dressings; Future    Intertriginous dermatitis associated with moisture  -     lidocaine (XYLOCAINE) 4 % topical solution 5 mL  -     Cancel: Wound cleansing and dressings; Future  -     Wound cleansing and dressings; Future  -     Wound cleansing and dressings; Future              Debridement   Wound 03/07/22 Surgical Leg Left;Upper;Medial    Universal Protocol:  Procedure performed by:  Consent: Verbal consent obtained    Risks and benefits: risks, benefits and alternatives were discussed  Consent given by: patient  Patient identity confirmed: verbally with patient      Performed by: physician  Debridement type: selective  Pain control: lidocaine 4%  Pre-debridement measurements  Length (cm): 3  Width (cm): 11  Depth (cm): 0 2  Surface Area (cm^2): 33  Volume (cm^3): 6 6    Post-debridement measurements  Length (cm): 3  Width (cm): 11  Depth (cm): 0 2  Percent debrided: 90%  Surface Area (cm^2): 33  Area debrided (cm^2): 29 7  Volume (cm^3): 6 6  Devitalized tissue debrided: biofilm, fibrin and eschar  Instrument(s) utilized: curette  Bleeding: small  Hemostasis obtained with: pressure  Procedural pain (0-10): 2  Post-procedural pain: 1   Response to treatment: procedure was tolerated well          Plan:  •  It was a pleasure to see Fanta Curry today for follow up care of his wounds today  • Selective debridement performed today as above of surgical wounds only  Other wounds cleansed with NS and gauze today  • Wound is not improving   • Continue plan of care as noted below with silver alginate to surgical wound  Clindamycin and adaptic to all other wounds  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, sweating more profusely, and more skin rubbing  • Patient did just finish a long course of oral doxycycline  He has started biweekly Humira injections as ordered by his dermatologist   •  A1C results reviewed with the patient today  • Will discuss diabetes and weight management at next appointment  Last A1C was 1 year ago  • No signs or symptoms of infection today  Patient understands that if any signs of infection start (such as increased redness, drainage, pain, fever, chills, diaphoresis), they should call our office or proceed to the ER or Urgent Care  • Patient should continue a high protein diet to facilitate wound healing  • Patient is advised to not submerge wound or leave wound open to air  • Follow up in 4 weeks  Advised patient that I recommend 1-2 weeks follow up due to the new wounds and addition of immunomodulator, but he declines  I advised him strongly to be cognizant of changes in the wounds as he is at high risk for infection now  • Given the multi-factorial nature of wound care, additional time was taken to review patient's treatment plan with other specialties and most recent pertinent lab work and imaging     • All plans of care discussed with patient at bedside who verbalized understanding with treatment plan  Wound 03/07/22 Surgical Leg Left;Upper;Medial (Active)   Wound Image Images linked 06/01/23 0857   Wound Description Yellow;Slough;Pink;Hypergranulation;Granulation tissue 06/01/23 0857   Sarika-wound Assessment Scar Tissue; Maceration; Induration;Pink 06/01/23 0857   Wound Length (cm) 3 cm 06/01/23 0857   Wound Width (cm) 11 cm 06/01/23 0857   Wound Depth (cm) 0 2 cm 06/01/23 0857   Wound Surface Area (cm^2) 33 cm^2 06/01/23 0857   Wound Volume (cm^3) 6 6 cm^3 06/01/23 0857   Calculated Wound Volume (cm^3) 6 6 cm^3 06/01/23 0857   Change in Wound Size % -12 24 06/01/23 0857   Undermining 0 3 06/01/23 0857   Undermining is depth extending from 6-9 o'clock 06/01/23 0857   Drainage Amount Moderate 06/01/23 0857   Drainage Description Serous 06/01/23 0857   Non-staged Wound Description Full thickness 06/01/23 0857   Dressing Status Intact (upon arrival) 06/01/23 0857       Wound 04/11/22 Incision Abdomen Left;Medial;Lower (Active)   Wound Image Images linked 06/01/23 0851   Wound Description Pink;Epithelialization;Yellow;Slough 06/01/23 0849   Sarika-wound Assessment Scar Tissue; Induration; Purple 06/01/23 0849   Wound Length (cm) 6 2 cm 06/01/23 0849   Wound Width (cm) 5 8 cm 06/01/23 0849   Wound Depth (cm) 0 2 cm 06/01/23 0849   Wound Surface Area (cm^2) 35 96 cm^2 06/01/23 0849   Wound Volume (cm^3) 7 192 cm^3 06/01/23 0849   Calculated Wound Volume (cm^3) 7 19 cm^3 06/01/23 0849   Change in Wound Size % -4393 75 06/01/23 0849   Drainage Amount Moderate 06/01/23 0849   Drainage Description Yellow;Purulent;Clear 06/01/23 0849   Non-staged Wound Description Full thickness 06/01/23 0849   Dressing Status Other (Comment) (no dressing upon visit) 06/01/23 0849       Wound 06/01/23 Thigh Left; Anterior (Active)   Wound Image Images linked 06/01/23 0903   Wound Description Yellow;Pink 06/01/23 0903   Sarika-wound Assessment Induration;Erythema; Maceration 06/01/23 0903   Wound Length (cm) 0 1 cm 06/01/23 0903   Wound Width (cm) 0 1 cm 06/01/23 0903   Wound Depth (cm) 0 1 cm 06/01/23 0903   Wound Surface Area (cm^2) 0 01 cm^2 06/01/23 0903   Wound Volume (cm^3) 0 001 cm^3 06/01/23 0903   Calculated Wound Volume (cm^3) 0 cm^3 06/01/23 0903   Drainage Amount Small 06/01/23 0903   Drainage Description Clear;Serous 06/01/23 0903   Non-staged Wound Description Full thickness 06/01/23 0903   Dressing Status Other (Comment) (no dressing upon arrival) 06/01/23 0903       Wound 06/01/23 Thigh Medial;Right (Active)   Wound Image Images linked 06/01/23 0901   Wound Description Pink;Granulation tissue; Yellow;Slough 06/01/23 0900   Sarika-wound Assessment Edema; Hyperpigmented; Purple 06/01/23 0900   Wound Length (cm) 6 4 cm 06/01/23 0900   Wound Width (cm) 5 5 cm 06/01/23 0900   Wound Depth (cm) 0 1 cm 06/01/23 0900   Wound Surface Area (cm^2) 35 2 cm^2 06/01/23 0900   Wound Volume (cm^3) 3 52 cm^3 06/01/23 0900   Calculated Wound Volume (cm^3) 3 52 cm^3 06/01/23 0900   Drainage Amount Moderate 06/01/23 0900   Drainage Description Clear 06/01/23 0900   Non-staged Wound Description Full thickness 06/01/23 0900   Dressing Status Other (Comment) (no dressing upon arrival) 06/01/23 0900       Wound 03/07/22 Surgical Leg Left;Upper;Medial (Active)   Date First Assessed/Time First Assessed: 03/07/22 0833   Primary Wound Type: Surgical  Location: Leg  Wound Location Orientation: Left;Upper;Medial  Wound Outcome: (c)        Wound 04/11/22 Incision Abdomen Left;Medial;Lower (Active)   Date First Assessed: 04/11/22   Primary Wound Type: Incision  Location: Abdomen  Wound Location Orientation: Left;Medial;Lower       Wound 06/01/23 Thigh Left; Anterior (Active)   Date First Assessed/Time First Assessed: 06/01/23 0855   Location: Thigh  Wound Location Orientation: Left; Anterior       Wound 06/01/23 Thigh Medial;Right (Active)   Date First Assessed/Time First Assessed: 06/01/23 0856   Location: Thigh  Wound Location Orientation: Medial;Right       [REMOVED] Wound 05/26/21 Incision Thigh Left (Removed)   Resolved Date: 03/07/22  Date First Assessed/Time First Assessed: 05/26/21 1914   Pre-Existing Wound: Yes  Primary Wound Type: Incision  Location: Thigh  Wound Location Orientation: Left  Wound Description (Comments): 2 incisions  Incision's 1st Dress    [REMOVED] Wound 05/26/21 Surgical Other (Comment) Groin Left (Removed)   Resolved Date: 03/07/22  Date First Assessed/Time First Assessed: 05/26/21 1916   Pre-Existing Wound: Yes  Primary Wound Type: Surgical  Traumatic Wound Type: (c) Other (Comment)  Location: Groin  Wound Location Orientation: Left  Wound Outcome: Unkno    [REMOVED] Wound 06/01/21 Other (comment) Cellulitis Pretibial Left (Removed)   Resolved Date: 03/07/22  Date First Assessed/Time First Assessed: 06/01/21 1930   Pre-Existing Wound: Yes  Primary Wound Type: Other (comment)  Traumatic Wound Type: Cellulitis  Location: Pretibial  Wound Location Orientation: Left  Dressing Status: C  [REMOVED] Wound 06/02/21 Hip Anterior;Right (Removed)   Resolved Date: 03/07/22  Date First Assessed/Time First Assessed: 06/02/21 1028   Location: Hip  Wound Location Orientation: Anterior;Right  Wound Outcome: Unknown (No longer present)       [REMOVED] Wound 06/02/21 Sacrum (Removed)   Resolved Date: 03/07/22  Date First Assessed/Time First Assessed: 06/02/21 1034   Location: Sacrum  Wound Outcome: Unknown (No longer present)       [REMOVED] Wound 07/06/21 Leg Left (Removed)   Resolved Date: 03/07/22  Date First Assessed/Time First Assessed: 07/06/21 1639   Location: Leg  Wound Location Orientation: Left  Incision's 1st Dressing: KERLIX SUPER SPONGE 6 IN (x2), ACE WRAP 6 IN UNSTERILE (x4), SPONGE GAUZE 4 X 4 16 PLY STRL FÁTIMA           [REMOVED] Wound 03/30/22 Sacrum (Removed)   Resolved Date: 04/11/22  Date First Assessed/Time First Assessed: 03/30/22 1610   Location: Sacrum  Wound Description (Comments): 4/1/22 Stage 2 POA Wound Outcome: (c) Other (Comment)       [REMOVED] Wound 03/30/22 Surgical Thigh Anterior; Left (Removed)   Resolved Date: 04/11/22  Date First Assessed/Time First Assessed: 03/30/22 1614   Pre-Existing Wound: Yes  Primary Wound Type: Surgical  Location: Thigh  Wound Location Orientation: Anterior; Left  Wound Description (Comments): Surgical Wound  Dressing    [REMOVED] Wound 03/30/22 Abdomen Left (Removed)   Resolved Date: 04/11/22  Date First Assessed/Time First Assessed: 03/30/22 1616   Location: Abdomen  Wound Location Orientation: Left  Wound Outcome: (c) Other (Comment)       [REMOVED] Wound 03/30/22 Thigh Anterior;Right (Removed)   Resolved Date: 04/11/22  Date First Assessed/Time First Assessed: 03/30/22 1617   Location: Thigh  Wound Location Orientation: Anterior;Right  Wound Outcome: (c) Other (Comment)       [REMOVED] Wound 03/30/22 Abscess Cellulitis Thigh Anterior;Left;Proximal (Removed)   Resolved Date: 04/11/22  Date First Assessed/Time First Assessed: 03/30/22 1618   Primary Wound Type: Abscess  Traumatic Wound Type: Cellulitis  Location: Thigh  Wound Location Orientation: Anterior;Left;Proximal  Dressing Status: New drainage  Wound     [REMOVED] Wound 05/04/22 Leg Left; Inner; Lower (Removed)   Resolved Date: 06/21/22  Date First Assessed/Time First Assessed: 05/04/22 2200   Pre-Existing Wound: Yes  Location: Leg  Wound Location Orientation: Left; Inner; Lower  Wound Outcome: (c) Other (Comment)       [REMOVED] Wound 05/04/22 Other (comment) Sacrum (Removed)   Resolved Date: 06/21/22  Date First Assessed/Time First Assessed: 05/04/22 0001   Pre-Existing Wound: Yes  Primary Wound Type: Other (comment)  Location: Sacrum  Wound Description (Comments): Pt states wound is closed/healed & declined assessment 6/21    [REMOVED] Wound 08/23/22 Incision Thigh Left;Proximal;Medial (Removed)   Resolved Date: 01/10/23  Date First Assessed: 08/23/22   Primary Wound Type:  Incision  Location: Thigh Wound Location Orientation: Left;Proximal;Medial  Wound Outcome: Healed       Subjective:           44year old M here today for fu care of wounds 2/2 hidradenitis suppurativa      The following portions of the patient's history were reviewed and updated as appropriate: allergies, current medications, past family history, past medical history, past social history, past surgical history and problem list     Review of Systems   Skin: Positive for wound  All other systems reviewed and are negative  Objective:       Wound 03/07/22 Surgical Leg Left;Upper;Medial (Active)   Wound Image Images linked 06/01/23 0857   Wound Description Yellow;Slough;Pink;Hypergranulation;Granulation tissue 06/01/23 0857   Sarika-wound Assessment Scar Tissue; Maceration; Induration;Pink 06/01/23 0857   Wound Length (cm) 3 cm 06/01/23 0857   Wound Width (cm) 11 cm 06/01/23 0857   Wound Depth (cm) 0 2 cm 06/01/23 0857   Wound Surface Area (cm^2) 33 cm^2 06/01/23 0857   Wound Volume (cm^3) 6 6 cm^3 06/01/23 0857   Calculated Wound Volume (cm^3) 6 6 cm^3 06/01/23 0857   Change in Wound Size % -12 24 06/01/23 0857   Undermining 0 3 06/01/23 0857   Undermining is depth extending from 6-9 o'clock 06/01/23 0857   Drainage Amount Moderate 06/01/23 0857   Drainage Description Serous 06/01/23 0857   Non-staged Wound Description Full thickness 06/01/23 0857   Dressing Status Intact (upon arrival) 06/01/23 0857       Wound 04/11/22 Incision Abdomen Left;Medial;Lower (Active)   Wound Image Images linked 06/01/23 0851   Wound Description Pink;Epithelialization;Yellow;Slough 06/01/23 0849   Sarika-wound Assessment Scar Tissue; Induration; Purple 06/01/23 0849   Wound Length (cm) 6 2 cm 06/01/23 0849   Wound Width (cm) 5 8 cm 06/01/23 0849   Wound Depth (cm) 0 2 cm 06/01/23 0849   Wound Surface Area (cm^2) 35 96 cm^2 06/01/23 0849   Wound Volume (cm^3) 7 192 cm^3 06/01/23 0849   Calculated Wound Volume (cm^3) 7 19 cm^3 06/01/23 0849   Change in Wound Size % -4393 75 06/01/23 0849   Drainage Amount Moderate 06/01/23 0849   Drainage Description Yellow;Purulent;Clear 06/01/23 0849   Non-staged Wound Description Full thickness 06/01/23 0849   Dressing Status Other (Comment) (no dressing upon visit) 06/01/23 0849       Wound 06/01/23 Thigh Left; Anterior (Active)   Wound Image Images linked 06/01/23 0903   Wound Description Yellow;Pink 06/01/23 0903   Sarika-wound Assessment Induration;Erythema; Maceration 06/01/23 0903   Wound Length (cm) 0 1 cm 06/01/23 0903   Wound Width (cm) 0 1 cm 06/01/23 0903   Wound Depth (cm) 0 1 cm 06/01/23 0903   Wound Surface Area (cm^2) 0 01 cm^2 06/01/23 0903   Wound Volume (cm^3) 0 001 cm^3 06/01/23 0903   Calculated Wound Volume (cm^3) 0 cm^3 06/01/23 0903   Drainage Amount Small 06/01/23 0903   Drainage Description Clear;Serous 06/01/23 0903   Non-staged Wound Description Full thickness 06/01/23 0903   Dressing Status Other (Comment) (no dressing upon arrival) 06/01/23 0903       Wound 06/01/23 Thigh Medial;Right (Active)   Wound Image Images linked 06/01/23 0901   Wound Description Pink;Granulation tissue; Yellow;Slough 06/01/23 0900   Sarika-wound Assessment Edema; Hyperpigmented; Purple 06/01/23 0900   Wound Length (cm) 6 4 cm 06/01/23 0900   Wound Width (cm) 5 5 cm 06/01/23 0900   Wound Depth (cm) 0 1 cm 06/01/23 0900   Wound Surface Area (cm^2) 35 2 cm^2 06/01/23 0900   Wound Volume (cm^3) 3 52 cm^3 06/01/23 0900   Calculated Wound Volume (cm^3) 3 52 cm^3 06/01/23 0900   Drainage Amount Moderate 06/01/23 0900   Drainage Description Clear 06/01/23 0900   Non-staged Wound Description Full thickness 06/01/23 0900   Dressing Status Other (Comment) (no dressing upon arrival) 06/01/23 0900       /83   Pulse 71   Temp (!) 97 1 °F (36 2 °C)   Resp 15     Physical Exam  Vitals reviewed  Constitutional:       Appearance: Normal appearance  He is obese  HENT:      Head: Normocephalic and atraumatic     Eyes:      Extraocular Movements: Extraocular movements intact  Pulmonary:      Effort: Pulmonary effort is normal    Musculoskeletal:      Cervical back: Neck supple  Skin:     Comments: Wounds of abdomen, BL medial thighs, L groin   Neurological:      Mental Status: He is alert  Psychiatric:         Mood and Affect: Mood normal            Wound Instructions:  Orders Placed This Encounter   Procedures   • Wound cleansing and dressings     Left Medial Thigh Wound (Surgical Wound)    Wash your hands with soap and water  Remove old dressing, discard into plastic bag and place in trash  Cleanse the wound with soap and water  prior to applying a clean dressing  Do not use tissue or cotton balls  Do not scrub the wound  Pat dry using gauze  Shower yes with wounds covered  Apply skin prep to skin surrounding wound  Apply silver alginate to the left thigh wound  Cover with ABD  Secure with tape  Change dressing three times per week  This was done today  Standing Status:   Future     Standing Expiration Date:   6/1/2024   • Wound cleansing and dressings     Abdominal wounds, Left Anterior Leg Wound, Right Thigh Wounds:          Apply warm compresses to the abdominal and thigh wound 1- 2x/day until compress cools down     Wash your hands with soap and water  Francisco Garcia old dressing, discard into plastic bag and place in trash   Cleanse the wound with soap and water prior to applying a clean dressing  Do not use tissue or cotton balls  Do not scrub the wound  Pat dry using gauze  Shower yes - with wounds covered   Apply Nystatin powder to abdominal skin fold   Apply Clindamycin ointment to the wounds as recommended by Dermatology  Then apply Adaptic gauze to wounds  Cover with gauze and secure with medipore tape  Change dressing daily       This was done today  (Clindamycin ointment was not applied)                                                     Standing Status:   Future     Standing Expiration Date:   6/1/2024   • Debridement This order was created via procedure documentation        Diagnosis ICD-10-CM Associated Orders   1  Non-healing surgical wound, initial encounter  T81 89XA lidocaine (XYLOCAINE) 4 % topical solution 5 mL     Wound cleansing and dressings     Wound cleansing and dressings     Debridement      2  Hidradenitis suppurativa  L73 2 lidocaine (XYLOCAINE) 4 % topical solution 5 mL     Wound cleansing and dressings     Wound cleansing and dressings      3  Morbid obesity with body mass index (BMI) of 50 0 to 59 9 in adult ([facility])  E66 01 lidocaine (XYLOCAINE) 4 % topical solution 5 mL    Z68 43 Wound cleansing and dressings     Wound cleansing and dressings      4   Intertriginous dermatitis associated with moisture  L30 4 lidocaine (XYLOCAINE) 4 % topical solution 5 mL     Wound cleansing and dressings     Wound cleansing and dressings

## 2023-06-28 ENCOUNTER — OFFICE VISIT (OUTPATIENT)
Dept: WOUND CARE | Facility: HOSPITAL | Age: 43
End: 2023-06-28
Payer: COMMERCIAL

## 2023-06-28 VITALS
DIASTOLIC BLOOD PRESSURE: 83 MMHG | RESPIRATION RATE: 18 BRPM | TEMPERATURE: 97.3 F | HEART RATE: 80 BPM | SYSTOLIC BLOOD PRESSURE: 132 MMHG

## 2023-06-28 DIAGNOSIS — E11.42 TYPE 2 DIABETES MELLITUS WITH DIABETIC POLYNEUROPATHY, WITHOUT LONG-TERM CURRENT USE OF INSULIN (HCC): ICD-10-CM

## 2023-06-28 DIAGNOSIS — T81.89XA NON-HEALING SURGICAL WOUND, INITIAL ENCOUNTER: ICD-10-CM

## 2023-06-28 DIAGNOSIS — L73.2 HIDRADENITIS SUPPURATIVA: Primary | ICD-10-CM

## 2023-06-28 PROCEDURE — 99213 OFFICE O/P EST LOW 20 MIN: CPT | Performed by: STUDENT IN AN ORGANIZED HEALTH CARE EDUCATION/TRAINING PROGRAM

## 2023-06-28 PROCEDURE — 97597 DBRDMT OPN WND 1ST 20 CM/<: CPT | Performed by: STUDENT IN AN ORGANIZED HEALTH CARE EDUCATION/TRAINING PROGRAM

## 2023-06-28 PROCEDURE — 97598 DBRDMT OPN WND ADDL 20CM/<: CPT | Performed by: STUDENT IN AN ORGANIZED HEALTH CARE EDUCATION/TRAINING PROGRAM

## 2023-06-28 RX ORDER — LIDOCAINE HYDROCHLORIDE 40 MG/ML
5 SOLUTION TOPICAL ONCE
Status: COMPLETED | OUTPATIENT
Start: 2023-06-28 | End: 2023-06-28

## 2023-06-28 RX ADMIN — LIDOCAINE HYDROCHLORIDE 5 ML: 40 SOLUTION TOPICAL at 08:53

## 2023-06-28 NOTE — PATIENT INSTRUCTIONS
Orders Placed This Encounter   Procedures    Wound cleansing and dressings     Left Medial Thigh Wound (Surgical Wound)     Wash your hands with soap and water  Remove old dressing, discard into plastic bag and place in trash  Cleanse the wound with soap and water  prior to applying a clean dressing  Do not use tissue or cotton balls  Do not scrub the wound  Pat dry using gauze  Shower yes with wounds covered  Apply skin prep to skin surrounding wound  Apply silver alginate to the left thigh wound  Cover with ABD  Secure with tape  Change dressing three times per week  This was done today  Standing Status:   Future     Standing Expiration Date:   6/28/2024    Wound cleansing and dressings         Abdominal wounds,  Right Thigh Wounds:          Apply warm compresses to the abdominal and thigh wound 1- 2x/day until compress cools down  Wash your hands with soap and water  Remove old dressing, discard into plastic bag and place in trash  Cleanse the wound with soap and water prior to applying a clean dressing  Do not use tissue or cotton balls  Do not scrub the wound  Pat dry using gauze  Shower yes - with wounds covered   Apply Nystatin powder to abdominal skin fold   Apply Clindamycin ointment to the wounds as recommended by Dermatology  Then apply Adaptic gauze to wounds  Cover with gauze and secure with medipore tape  Change dressing daily        This was done today  (Clindamycin ointment was not applied)                                          Standing Status:   Future     Standing Expiration Date:   6/28/2024

## 2023-06-28 NOTE — PROGRESS NOTES
Patient ID: America Alatorre is a 44 y o  male Date of Birth 1980     Chief Complaint  Chief Complaint   Patient presents with   • Follow Up Wound Care Visit     Groin wound       Allergies  Amoxicillin    Assessment:     Diagnoses and all orders for this visit:    Hidradenitis suppurativa  -     lidocaine (XYLOCAINE) 4 % topical solution 5 mL  -     Wound cleansing and dressings; Future  -     Wound cleansing and dressings; Future  -     Debridement    Non-healing surgical wound, initial encounter  -     Debridement    Type 2 diabetes mellitus with diabetic polyneuropathy, without long-term current use of insulin ([facility] )              Debridement   Wound 03/07/22 Surgical Leg Left;Upper;Medial    Universal Protocol:  Consent: Verbal consent obtained  Risks and benefits: risks, benefits and alternatives were discussed  Consent given by: patient  Patient identity confirmed: verbally with patient      Performed by: physician  Debridement type: selective  Pain control: lidocaine 4%  Pre-debridement measurements  Length (cm): 3 7  Width (cm): 1 2  Depth (cm): 0 2  Surface Area (cm^2): 4 44  Volume (cm^3): 0 89    Post-debridement measurements  Length (cm): 3 7  Width (cm): 1 2  Depth (cm): 0 2  Percent debrided: 80%  Surface Area (cm^2): 4 44  Area debrided (cm^2): 3 55  Volume (cm^3): 0 89  Devitalized tissue debrided: biofilm and exudate  Instrument(s) utilized: curette  Bleeding: small  Hemostasis obtained with: pressure  Procedural pain (0-10): 1  Post-procedural pain: 0   Response to treatment: procedure was tolerated well    Debridement   Wound 06/01/23 Thigh Left; Anterior    Universal Protocol:  Consent: Verbal consent obtained    Risks and benefits: risks, benefits and alternatives were discussed  Consent given by: patient  Patient identity confirmed: verbally with patient      Performed by: physician  Debridement type: selective  Pain control: lidocaine 4%  Pre-debridement measurements  Length (cm): 3 7  Width (cm): 1 2  Depth (cm): 0 2  Surface Area (cm^2): 4 44  Volume (cm^3): 0 89    Post-debridement measurements  Length (cm): 3 7  Width (cm): 0 2  Depth (cm): 0 2  Percent debrided: 90%  Surface Area (cm^2): 0 74  Area debrided (cm^2): 0 67  Volume (cm^3): 0 15  Devitalized tissue debrided: biofilm and exudate  Instrument(s) utilized: curette  Bleeding: small  Hemostasis obtained with: pressure  Procedural pain (0-10): 1  Post-procedural pain: 0   Response to treatment: procedure was tolerated well          Plan:  • It was a pleasure to see Coco Moran today for follow up care of his wound today  • Selective debridement performed today as above of LLE wound today  Other wounds cleansed with NS and gauze  • Wounds are improving   • Continue plan of care as noted below with clindamycin on abdominal and RLE wounds  Silver alginate to LLE wound  • He has started biweekly Humira injections as ordered by his dermatologist   • 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, sweating more profusely, and more skin rubbing  • He also notes that last cigarette use was over 2 years ago  He is very motivated and using good hygiene and wound care and keeping the wound healthy and clean  He is also working on losing weight as he does understand both smoking and weight management are important to overall prognosis for hidradenitis  • No signs or symptoms of infection today  Patient understands that if any signs of infection start (such as increased redness, drainage, pain, fever, chills, diaphoresis), they should call our office or proceed to the ER or Urgent Care  • Patient should continue a high protein diet to facilitate wound healing  • Patient is advised to not submerge wound or leave wound open to air    • Follow up in 4 weeks  • Given the multi-factorial nature of wound care, additional time was taken to review patient's treatment plan with other specialties and most recent pertinent lab work and imaging  • All plans of care discussed with patient at bedside who verbalized understanding with treatment plan  Wound 03/07/22 Surgical Leg Left;Upper;Medial (Active)   Wound Image -- (unable to take photo) 06/28/23 0841   Wound Description Pink; Hypergranulation;Granulation tissue 06/28/23 0841   Sarika-wound Assessment Scar Tissue; Maceration; Induration;Pink 06/28/23 0841   Wound Length (cm) 3 7 cm 06/28/23 0841   Wound Width (cm) 1 2 cm 06/28/23 0841   Wound Depth (cm) 0 2 cm 06/28/23 0841   Wound Surface Area (cm^2) 4 44 cm^2 06/28/23 0841   Wound Volume (cm^3) 0 888 cm^3 06/28/23 0841   Calculated Wound Volume (cm^3) 0 89 cm^3 06/28/23 0841   Change in Wound Size % 84 86 06/28/23 0841   Drainage Amount Small 06/28/23 0841   Drainage Description Serous 06/28/23 0841   Non-staged Wound Description Full thickness 06/28/23 0841   Dressing Status Intact 06/28/23 0841       Wound 04/11/22 Incision Abdomen Left;Medial;Lower (Active)   Wound Image Images linked 06/28/23 0844   Wound Description Pink;Epithelialization;Yellow;Slough 06/28/23 0844   Sarika-wound Assessment Scar Tissue; Induration; Purple 06/28/23 0844   Wound Length (cm) 2 5 cm 06/28/23 0844   Wound Width (cm) 1 5 cm 06/28/23 0844   Wound Depth (cm) 0 1 cm 06/28/23 0844   Wound Surface Area (cm^2) 3 75 cm^2 06/28/23 0844   Wound Volume (cm^3) 0 375 cm^3 06/28/23 0844   Calculated Wound Volume (cm^3) 0 38 cm^3 06/28/23 0844   Change in Wound Size % -137 5 06/28/23 0844   Drainage Amount Moderate 06/28/23 0844   Drainage Description Yellow;Purulent;Clear 06/28/23 0844   Non-staged Wound Description Full thickness 06/28/23 0844   Dressing Status Intact 06/28/23 0844       Wound 06/01/23 Thigh Left; Anterior (Active)   Wound Image Images linked 06/28/23 0900   Wound Description Epithelialization;Pink 06/28/23 0833   Sarika-wound Assessment Intact; Pink 06/28/23 0833   Wound Length (cm) 3 7 cm 06/28/23 0833   Wound Width (cm) 1 2 cm 06/28/23 0833   Wound Depth (cm) 0 2 cm 06/28/23 0833   Wound Surface Area (cm^2) 4 44 cm^2 06/28/23 0833   Wound Volume (cm^3) 0 888 cm^3 06/28/23 0833   Calculated Wound Volume (cm^3) 0 89 cm^3 06/28/23 0833   Drainage Amount Small 06/28/23 0833   Drainage Description Serous 06/28/23 0833   Non-staged Wound Description Not applicable 92/30/80 2937   Dressing Status Intact (no dressing on arrival) 06/28/23 0833       Wound 06/01/23 Thigh Medial;Right (Active)   Wound Image Images linked 06/28/23 0846   Wound Description Pink;Granulation tissue; Yellow;Slough; Epithelialization 06/28/23 0846   Sarika-wound Assessment Edema; Hyperpigmented; Purple 06/28/23 0846   Wound Length (cm) 0 5 cm 06/28/23 0846   Wound Width (cm) 0 4 cm 06/28/23 0846   Wound Depth (cm) 0 1 cm 06/28/23 0846   Wound Surface Area (cm^2) 0 2 cm^2 06/28/23 0846   Wound Volume (cm^3) 0 02 cm^3 06/28/23 0846   Calculated Wound Volume (cm^3) 0 02 cm^3 06/28/23 0846   Change in Wound Size % 99 43 06/28/23 0846   Drainage Amount Small 06/28/23 0846   Drainage Description Serous 06/28/23 0846   Non-staged Wound Description Full thickness 06/28/23 0846   Dressing Status Intact 06/28/23 0846       Wound 03/07/22 Surgical Leg Left;Upper;Medial (Active)   Date First Assessed/Time First Assessed: 03/07/22 0833   Primary Wound Type: Surgical  Location: Leg  Wound Location Orientation: Left;Upper;Medial  Wound Outcome: (c)        Wound 04/11/22 Incision Abdomen Left;Medial;Lower (Active)   Date First Assessed: 04/11/22   Primary Wound Type: Incision  Location: Abdomen  Wound Location Orientation: Left;Medial;Lower       Wound 06/01/23 Thigh Left; Anterior (Active)   Date First Assessed/Time First Assessed: 06/01/23 0855   Location: Thigh  Wound Location Orientation: Left; Anterior       Wound 06/01/23 Thigh Medial;Right (Active)   Date First Assessed/Time First Assessed: 06/01/23 0856   Location: Thigh  Wound Location Orientation: Medial;Right       [REMOVED] Wound 05/26/21 Incision Thigh Left (Removed)   Resolved Date: 03/07/22  Date First Assessed/Time First Assessed: 05/26/21 1914   Pre-Existing Wound: Yes  Primary Wound Type: Incision  Location: Thigh  Wound Location Orientation: Left  Wound Description (Comments): 2 incisions  Incision's 1st Dress    [REMOVED] Wound 05/26/21 Surgical Other (Comment) Groin Left (Removed)   Resolved Date: 03/07/22  Date First Assessed/Time First Assessed: 05/26/21 1916   Pre-Existing Wound: Yes  Primary Wound Type: Surgical  Traumatic Wound Type: (c) Other (Comment)  Location: Groin  Wound Location Orientation: Left  Wound Outcome: Unkno    [REMOVED] Wound 06/01/21 Other (comment) Cellulitis Pretibial Left (Removed)   Resolved Date: 03/07/22  Date First Assessed/Time First Assessed: 06/01/21 1930   Pre-Existing Wound: Yes  Primary Wound Type: Other (comment)  Traumatic Wound Type: Cellulitis  Location: Pretibial  Wound Location Orientation: Left  Dressing Status: C  [REMOVED] Wound 06/02/21 Hip Anterior;Right (Removed)   Resolved Date: 03/07/22  Date First Assessed/Time First Assessed: 06/02/21 1028   Location: Hip  Wound Location Orientation: Anterior;Right  Wound Outcome: Unknown (No longer present)       [REMOVED] Wound 06/02/21 Sacrum (Removed)   Resolved Date: 03/07/22  Date First Assessed/Time First Assessed: 06/02/21 1034   Location: Sacrum  Wound Outcome: Unknown (No longer present)       [REMOVED] Wound 07/06/21 Leg Left (Removed)   Resolved Date: 03/07/22  Date First Assessed/Time First Assessed: 07/06/21 1639   Location: Leg  Wound Location Orientation: Left  Incision's 1st Dressing: KERLIX SUPER SPONGE 6 IN (x2), ACE WRAP 6 IN UNSTERILE (x4), SPONGE GAUZE 4 X 4 16 PLY STRL FÁTIMA           [REMOVED] Wound 03/30/22 Sacrum (Removed)   Resolved Date: 04/11/22  Date First Assessed/Time First Assessed: 03/30/22 1610   Location: Sacrum  Wound Description (Comments): 4/1/22 Stage 2 POA  Wound Outcome: (c) Other (Comment)       [REMOVED] Wound 03/30/22 Surgical Thigh Anterior; Left (Removed)   Resolved Date: 04/11/22  Date First Assessed/Time First Assessed: 03/30/22 1614   Pre-Existing Wound: Yes  Primary Wound Type: Surgical  Location: Thigh  Wound Location Orientation: Anterior; Left  Wound Description (Comments): Surgical Wound  Dressing    [REMOVED] Wound 03/30/22 Abdomen Left (Removed)   Resolved Date: 04/11/22  Date First Assessed/Time First Assessed: 03/30/22 1616   Location: Abdomen  Wound Location Orientation: Left  Wound Outcome: (c) Other (Comment)       [REMOVED] Wound 03/30/22 Thigh Anterior;Right (Removed)   Resolved Date: 04/11/22  Date First Assessed/Time First Assessed: 03/30/22 1617   Location: Thigh  Wound Location Orientation: Anterior;Right  Wound Outcome: (c) Other (Comment)       [REMOVED] Wound 03/30/22 Abscess Cellulitis Thigh Anterior;Left;Proximal (Removed)   Resolved Date: 04/11/22  Date First Assessed/Time First Assessed: 03/30/22 1618   Primary Wound Type: Abscess  Traumatic Wound Type: Cellulitis  Location: Thigh  Wound Location Orientation: Anterior;Left;Proximal  Dressing Status: New drainage  Wound     [REMOVED] Wound 05/04/22 Leg Left; Inner; Lower (Removed)   Resolved Date: 06/21/22  Date First Assessed/Time First Assessed: 05/04/22 2200   Pre-Existing Wound: Yes  Location: Leg  Wound Location Orientation: Left; Inner; Lower  Wound Outcome: (c) Other (Comment)       [REMOVED] Wound 05/04/22 Other (comment) Sacrum (Removed)   Resolved Date: 06/21/22  Date First Assessed/Time First Assessed: 05/04/22 0001   Pre-Existing Wound: Yes  Primary Wound Type: Other (comment)  Location: Sacrum  Wound Description (Comments): Pt states wound is closed/healed & declined assessment 6/21    [REMOVED] Wound 08/23/22 Incision Thigh Left;Proximal;Medial (Removed)   Resolved Date: 01/10/23  Date First Assessed: 08/23/22   Primary Wound Type:  Incision  Location: Thigh  Wound Location Orientation: Left;Proximal;Medial  Wound Outcome: Healed       Subjective:           [de-identified] male with past medical history of hydradenitis suppurativa currently on Humira following with dermatology at [facility] here today for follow-up care of abdominal wound, right lower extremity wound, left lower extremity upper thigh nonhealing surgical wound  Is been using clindamycin and silver alginate as directed  The following portions of the patient's history were reviewed and updated as appropriate: allergies, current medications, past family history, past medical history, past social history, past surgical history and problem list     Review of Systems   Skin: Positive for wound  All other systems reviewed and are negative  Objective:       Wound 03/07/22 Surgical Leg Left;Upper;Medial (Active)   Wound Image -- (unable to take photo) 06/28/23 0841   Wound Description Pink; Hypergranulation;Granulation tissue 06/28/23 0841   Sarika-wound Assessment Scar Tissue; Maceration; Induration;Pink 06/28/23 0841   Wound Length (cm) 3 7 cm 06/28/23 0841   Wound Width (cm) 1 2 cm 06/28/23 0841   Wound Depth (cm) 0 2 cm 06/28/23 0841   Wound Surface Area (cm^2) 4 44 cm^2 06/28/23 0841   Wound Volume (cm^3) 0 888 cm^3 06/28/23 0841   Calculated Wound Volume (cm^3) 0 89 cm^3 06/28/23 0841   Change in Wound Size % 84 86 06/28/23 0841   Drainage Amount Small 06/28/23 0841   Drainage Description Serous 06/28/23 0841   Non-staged Wound Description Full thickness 06/28/23 0841   Dressing Status Intact 06/28/23 0841       Wound 04/11/22 Incision Abdomen Left;Medial;Lower (Active)   Wound Image Images linked 06/28/23 0844   Wound Description Pink;Epithelialization;Yellow;Slough 06/28/23 0844   Sarika-wound Assessment Scar Tissue; Induration; Purple 06/28/23 0844   Wound Length (cm) 2 5 cm 06/28/23 0844   Wound Width (cm) 1 5 cm 06/28/23 0844   Wound Depth (cm) 0 1 cm 06/28/23 0844   Wound Surface Area (cm^2) 3 75 cm^2 06/28/23 0844   Wound Volume (cm^3) 0 375 cm^3 06/28/23 0844   Calculated Wound Volume (cm^3) 0 38 cm^3 06/28/23 0844   Change in Wound Size % -137 5 06/28/23 0844   Drainage Amount Moderate 06/28/23 0844   Drainage Description Yellow;Purulent;Clear 06/28/23 0844   Non-staged Wound Description Full thickness 06/28/23 0844   Dressing Status Intact 06/28/23 0844       Wound 06/01/23 Thigh Left; Anterior (Active)   Wound Image Images linked 06/28/23 0900   Wound Description Epithelialization;Pink 06/28/23 0833   Sarika-wound Assessment Intact; Pink 06/28/23 0833   Wound Length (cm) 3 7 cm 06/28/23 0833   Wound Width (cm) 1 2 cm 06/28/23 0833   Wound Depth (cm) 0 2 cm 06/28/23 0833   Wound Surface Area (cm^2) 4 44 cm^2 06/28/23 0833   Wound Volume (cm^3) 0 888 cm^3 06/28/23 0833   Calculated Wound Volume (cm^3) 0 89 cm^3 06/28/23 0833   Drainage Amount Small 06/28/23 0833   Drainage Description Serous 06/28/23 0833   Non-staged Wound Description Not applicable 33/87/44 9593   Dressing Status Intact (no dressing on arrival) 06/28/23 0833       Wound 06/01/23 Thigh Medial;Right (Active)   Wound Image Images linked 06/28/23 0846   Wound Description Pink;Granulation tissue; Yellow;Slough; Epithelialization 06/28/23 0846   Sarika-wound Assessment Edema; Hyperpigmented; Purple 06/28/23 0846   Wound Length (cm) 0 5 cm 06/28/23 0846   Wound Width (cm) 0 4 cm 06/28/23 0846   Wound Depth (cm) 0 1 cm 06/28/23 0846   Wound Surface Area (cm^2) 0 2 cm^2 06/28/23 0846   Wound Volume (cm^3) 0 02 cm^3 06/28/23 0846   Calculated Wound Volume (cm^3) 0 02 cm^3 06/28/23 0846   Change in Wound Size % 99 43 06/28/23 0846   Drainage Amount Small 06/28/23 0846   Drainage Description Serous 06/28/23 0846   Non-staged Wound Description Full thickness 06/28/23 0846   Dressing Status Intact 06/28/23 0846       /83   Pulse 80   Temp (!) 97 3 °F (36 3 °C)   Resp 18     Physical Exam  Vitals reviewed  Constitutional:       Appearance: Normal appearance  He is obese     HENT:      Head: Normocephalic and atraumatic  Eyes:      Extraocular Movements: Extraocular movements intact  Pulmonary:      Effort: Pulmonary effort is normal    Musculoskeletal:      Cervical back: Neck supple  Neurological:      Mental Status: He is alert  Psychiatric:         Mood and Affect: Mood normal                              Wound Instructions:  Orders Placed This Encounter   Procedures   • Wound cleansing and dressings     Left Medial Thigh Wound (Surgical Wound)     Wash your hands with soap and water  Remove old dressing, discard into plastic bag and place in trash  Cleanse the wound with soap and water  prior to applying a clean dressing  Do not use tissue or cotton balls  Do not scrub the wound  Pat dry using gauze  Shower yes with wounds covered  Apply skin prep to skin surrounding wound  Apply silver alginate to the left thigh wound  Cover with ABD  Secure with tape  Change dressing three times per week      This was done today  Standing Status:   Future     Standing Expiration Date:   6/28/2024   • Wound cleansing and dressings         Abdominal wounds,  Right Thigh Wounds:          Apply warm compresses to the abdominal and thigh wound 1- 2x/day until compress cools down     Wash your hands with soap and water  Bridgerell Mcburney old dressing, discard into plastic bag and place in trash   Cleanse the wound with soap and water prior to applying a clean dressing  Do not use tissue or cotton balls  Do not scrub the wound  Pat dry using gauze  Shower yes - with wounds covered   Apply Nystatin powder to abdominal skin fold   Apply Clindamycin ointment to the wounds as recommended by Dermatology  Then apply Adaptic gauze to wounds  Cover with gauze and secure with medipore tape     Change dressing daily       This was done today  (Clindamycin ointment was not applied)                                          Standing Status:   Future     Standing Expiration Date:   6/28/2024   • Debridement     This order "was created via procedure documentation   • Debridement     This order was created via procedure documentation        Diagnosis ICD-10-CM Associated Orders   1  Hidradenitis suppurativa  L73 2 lidocaine (XYLOCAINE) 4 % topical solution 5 mL     Wound cleansing and dressings     Wound cleansing and dressings     Debridement      2  Non-healing surgical wound, initial encounter  T81 [id number] Debridement      3  Type 2 diabetes mellitus with diabetic polyneuropathy, without long-term current use of insulin ([facility])  E11 42            --  Swathi Pop MD    \"This note has been constructed using a voice recognition system  Therefore there may be syntax, spelling, and/or grammatical errors  Please call if you have any questions  All questions and concerns were addressed and answered by myself  \"       "

## 2023-07-05 DIAGNOSIS — I10 ESSENTIAL HYPERTENSION: ICD-10-CM

## 2023-07-05 RX ORDER — AMLODIPINE BESYLATE 10 MG/1
10 TABLET ORAL DAILY
Qty: 90 TABLET | Refills: 1 | Status: SHIPPED | OUTPATIENT
Start: 2023-07-05

## 2023-07-05 NOTE — TELEPHONE ENCOUNTER
Pt called again. Is now 5 days w/o meds. Routing to Dr Gabriela Quevedo to review when in office tonight.

## 2023-07-19 DIAGNOSIS — E66.01 MORBID OBESITY (HCC): ICD-10-CM

## 2023-07-19 DIAGNOSIS — E11.42 DM TYPE 2 WITH DIABETIC PERIPHERAL NEUROPATHY (HCC): ICD-10-CM

## 2023-07-25 ENCOUNTER — TELEPHONE (OUTPATIENT)
Dept: FAMILY MEDICINE CLINIC | Facility: CLINIC | Age: 43
End: 2023-07-25

## 2023-07-25 RX ORDER — LIRAGLUTIDE 6 MG/ML
INJECTION SUBCUTANEOUS
Qty: 6 ML | Refills: 3 | Status: SHIPPED | OUTPATIENT
Start: 2023-07-25

## 2023-07-25 NOTE — TELEPHONE ENCOUNTER
----- Message from Zachary Hinojosa sent at 7/25/2023  8:05 AM EDT -----  Regarding: FW: Please schedule for OVL      ----- Message -----  From: Marie Ordoñez MD  Sent: 7/25/2023   3:00 AM EDT  To: Alek Carter; Dipti Chaudhary  Subject: Please schedule for OVL                          He needs follow up for his uncontrolled diabetes and other chronic conditions.   Thanks, Ash Gaspar

## 2023-07-26 ENCOUNTER — OFFICE VISIT (OUTPATIENT)
Dept: WOUND CARE | Facility: HOSPITAL | Age: 43
End: 2023-07-26
Payer: COMMERCIAL

## 2023-07-26 VITALS
SYSTOLIC BLOOD PRESSURE: 147 MMHG | TEMPERATURE: 97.4 F | HEART RATE: 70 BPM | RESPIRATION RATE: 16 BRPM | DIASTOLIC BLOOD PRESSURE: 83 MMHG

## 2023-07-26 DIAGNOSIS — L02.211 ABSCESS OF SKIN OF ABDOMEN: ICD-10-CM

## 2023-07-26 DIAGNOSIS — T81.89XA NON-HEALING SURGICAL WOUND, INITIAL ENCOUNTER: ICD-10-CM

## 2023-07-26 DIAGNOSIS — E66.01 MORBID OBESITY WITH BODY MASS INDEX (BMI) OF 50.0 TO 59.9 IN ADULT (HCC): ICD-10-CM

## 2023-07-26 DIAGNOSIS — L73.2 HIDRADENITIS SUPPURATIVA: Primary | ICD-10-CM

## 2023-07-26 DIAGNOSIS — E11.42 TYPE 2 DIABETES MELLITUS WITH DIABETIC POLYNEUROPATHY, WITHOUT LONG-TERM CURRENT USE OF INSULIN (HCC): ICD-10-CM

## 2023-07-26 DIAGNOSIS — I89.0 LYMPHEDEMA OF BOTH LOWER EXTREMITIES: ICD-10-CM

## 2023-07-26 PROCEDURE — 97597 DBRDMT OPN WND 1ST 20 CM/<: CPT | Performed by: STUDENT IN AN ORGANIZED HEALTH CARE EDUCATION/TRAINING PROGRAM

## 2023-07-26 RX ORDER — LIDOCAINE HYDROCHLORIDE 40 MG/ML
5 SOLUTION TOPICAL ONCE
Status: COMPLETED | OUTPATIENT
Start: 2023-07-26 | End: 2023-07-26

## 2023-07-26 RX ORDER — CLINDAMYCIN PHOSPHATE 10 MG/G
GEL TOPICAL 2 TIMES DAILY
Qty: 30 G | Refills: 1 | Status: SHIPPED | OUTPATIENT
Start: 2023-07-26

## 2023-07-26 RX ADMIN — LIDOCAINE HYDROCHLORIDE 5 ML: 40 SOLUTION TOPICAL at 08:55

## 2023-07-26 NOTE — PATIENT INSTRUCTIONS
Orders Placed This Encounter   Procedures    Wound cleansing and dressings     Left Thigh Wounds (Surgical Wound)     Wash your hands with soap and water. Remove old dressing, discard into plastic bag and place in trash. Cleanse the wound with soap and water  prior to applying a clean dressing. Do not use tissue or cotton balls. Do not scrub the wound. Pat dry using gauze. Shower yes with wounds covered. Apply skin prep to skin surrounding wound  Apply Polymem the left thigh wounds. Cover with ABD  Secure with tape. Change dressing three times per week. This was done today. Standing Status:   Future     Standing Expiration Date:   7/26/2024    Wound cleansing and dressings     Abdominal wounds,  Right Thigh Wounds:          Apply warm compresses to the abdominal and thigh wound 1- 2x/day until compress cools down. Wash your hands with soap and water. Remove old dressing, discard into plastic bag and place in trash. Cleanse the wound with soap and water prior to applying a clean dressing. Do not use tissue or cotton balls. Do not scrub the wound. Pat dry using gauze. Shower yes - with wounds covered   Apply Nystatin powder to abdominal skin fold   Apply Clindamycin ointment to the wounds as recommended by Dermatology. Then apply Adaptic gauze to wounds. Cover with gauze and secure with medipore tape. Change dressing daily.       This was done today  (Clindamycin ointment was not applied)        Standing Status:   Future     Standing Expiration Date:   7/26/2024

## 2023-07-26 NOTE — PROGRESS NOTES
Patient ID: Jessica Olsen is a 44 y.o. male Date of Birth 1980     Chief Complaint  Chief Complaint   Patient presents with   • Follow Up Wound Care Visit     Groin wound       Allergies  Amoxicillin    Assessment:     Diagnoses and all orders for this visit:    Hidradenitis suppurativa    Abscess of skin of abdomen  -     lidocaine (XYLOCAINE) 4 % topical solution 5 mL  -     Wound cleansing and dressings; Future  -     Wound cleansing and dressings; Future  -     clindamycin (CLINDAGEL) 1 % gel; Apply topically 2 (two) times a day    Non-healing surgical wound, initial encounter  -     lidocaine (XYLOCAINE) 4 % topical solution 5 mL  -     Wound cleansing and dressings; Future  -     Wound cleansing and dressings; Future  -     Debridement  -     Debridement    Type 2 diabetes mellitus with diabetic polyneuropathy, without long-term current use of insulin (HCC)    Morbid obesity with body mass index (BMI) of 50.0 to 59.9 in [facility])    Lymphedema of both lower extremities              Debridement   Wound 03/07/22 Surgical Leg Left;Upper;Medial    Universal Protocol:  Consent: Verbal consent obtained. Risks and benefits: risks, benefits and alternatives were discussed  Consent given by: patient  Time out: Immediately prior to procedure a "time out" was called to verify the correct patient, procedure, equipment, support staff and site/side marked as required.   Patient identity confirmed: verbally with patient      Performed by: physician  Debridement type: selective  Pain control: lidocaine 4%  Pre-debridement measurements  Length (cm): 2.4  Width (cm): 3  Depth (cm): 0.2  Surface Area (cm^2): 7.2  Volume (cm^3): 1.44    Post-debridement measurements  Length (cm): 2.4  Width (cm): 3  Depth (cm): 0.2  Percent debrided: 90%  Surface Area (cm^2): 7.2  Area debrided (cm^2): 6.48  Volume (cm^3): 1.44  Devitalized tissue debrided: biofilm and exudate  Instrument(s) utilized: curette  Bleeding: small  Hemostasis obtained with: pressure  Procedural pain (0-10): 1  Post-procedural pain: 0   Response to treatment: procedure was tolerated well    Debridement   Wound 06/01/23 Thigh Left; Anterior    Universal Protocol:  Consent: Verbal consent obtained. Risks and benefits: risks, benefits and alternatives were discussed  Consent given by: patient  Time out: Immediately prior to procedure a "time out" was called to verify the correct patient, procedure, equipment, support staff and site/side marked as required. Patient identity confirmed: verbally with patient      Performed by: physician  Debridement type: selective  Pain control: lidocaine 4%  Pre-debridement measurements  Length (cm): 1  Width (cm): 3  Depth (cm): 0.2  Surface Area (cm^2): 3  Volume (cm^3): 0.6    Post-debridement measurements  Length (cm): 1  Width (cm): 3  Depth (cm): 0.2  Percent debrided: 90%  Surface Area (cm^2): 3  Area debrided (cm^2): 2.7  Volume (cm^3): 0.6  Devitalized tissue debrided: biofilm and exudate  Instrument(s) utilized: curette  Bleeding: small  Hemostasis obtained with: pressure  Procedural pain (0-10): 1  Post-procedural pain: 0   Response to treatment: procedure was tolerated well          Plan:  • It was a pleasure to see Kathy Gould for wound care follow up today  • Selective debridement performed today as above wounds of left thigh wounds only. Both the left thigh wounds are postsurgical.  All other wounds cleansed with saline and gauze today. • Wounds are improving   • Continue plan of care as noted below with Romycin ointment underneath Adaptic to abdominal wounds and right thigh wounds. Will change to PolyMem plus for left thigh wounds. •  A1C results reviewed with the patient today. • He has started biweekly Humira injections as ordered by his dermatologist.  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   • He also notes that last cigarette use was over 2 years ago. He is very motivated and using good hygiene and wound care and keeping the wound healthy and clean. He is also working on losing weight as he does understand both smoking and weight management are important to overall prognosis for hidradenitis. • No signs or symptoms of infection today. Patient understands that if any signs of infection start (such as increased redness, drainage, pain, fever, chills, diaphoresis), they should call our office or proceed to the ER or Urgent Care. • Patient should continue a high protein diet to facilitate wound healing  • Patient is advised to not submerge wound or leave wound open to air. • Follow up in 4 weeks  • Given the multi-factorial nature of wound care, additional time was taken to review patient's treatment plan with other specialties and most recent pertinent lab work and imaging. • All plans of care discussed with patient at bedside who verbalized understanding with treatment plan. Wound 03/07/22 Surgical Leg Left;Upper;Medial (Active)   Wound Image Images linked 07/26/23 0846   Wound Description Pink; Hypergranulation;Granulation tissue; Yellow;Slough 07/26/23 0846   Sarika-wound Assessment Scar Tissue; Maceration; Induration;Pink 07/26/23 0846   Wound Length (cm) 2.4 cm 07/26/23 0846   Wound Width (cm) 3 cm 07/26/23 0846   Wound Depth (cm) 0.2 cm 07/26/23 0846   Wound Surface Area (cm^2) 7.2 cm^2 07/26/23 0846   Wound Volume (cm^3) 1.44 cm^3 07/26/23 0846   Calculated Wound Volume (cm^3) 1.44 cm^3 07/26/23 0846   Change in Wound Size % 75.51 07/26/23 0846   Drainage Amount Small 07/26/23 0846   Drainage Description Serous 07/26/23 0846   Non-staged Wound Description Full thickness 07/26/23 0846   Dressing Status Intact 07/26/23 0846       Wound 04/11/22 Incision Abdomen Left;Medial;Lower (Active)   Wound Image Images linked 07/26/23 0841   Wound Description Epithelialization;Pink;Granulation tissue 07/26/23 0841   Sarika-wound Assessment Scar Tissue; Induration; Purple 07/26/23 0841   Wound Length (cm) 3 cm 07/26/23 0841   Wound Width (cm) 1.5 cm 07/26/23 0841   Wound Depth (cm) 0.1 cm 07/26/23 0841   Wound Surface Area (cm^2) 4.5 cm^2 07/26/23 0841   Wound Volume (cm^3) 0.45 cm^3 07/26/23 0841   Calculated Wound Volume (cm^3) 0.45 cm^3 07/26/23 0841   Change in Wound Size % -181.25 07/26/23 0841   Drainage Amount Small 07/26/23 0841   Drainage Description Yellow;Clear 07/26/23 0841   Non-staged Wound Description Full thickness 07/26/23 0841   Dressing Status Intact 07/26/23 0841       Wound 06/01/23 Thigh Left; Anterior (Active)   Wound Image Images linked 07/26/23 0849   Wound Description Epithelialization;Pink;Yellow;Slough 07/26/23 0849   Sarika-wound Assessment Intact;Pink;Maceration; Induration 07/26/23 0849   Wound Length (cm) 1 cm 07/26/23 0849   Wound Width (cm) 3 cm 07/26/23 0849   Wound Depth (cm) 0.2 cm 07/26/23 0849   Wound Surface Area (cm^2) 3 cm^2 07/26/23 0849   Wound Volume (cm^3) 0.6 cm^3 07/26/23 0849   Calculated Wound Volume (cm^3) 0.6 cm^3 07/26/23 0849   Drainage Amount Small 07/26/23 0849   Drainage Description Serous 07/26/23 0849   Dressing Status Intact 07/26/23 0849       Wound 06/01/23 Thigh Medial;Right (Active)   Wound Image Images linked 07/26/23 0844   Wound Description Pink;Granulation tissue;Slough; Epithelialization; White 07/26/23 0843   Sarika-wound Assessment Edema; Hyperpigmented; Purple 07/26/23 0843   Wound Length (cm) 0.2 cm 07/26/23 0843   Wound Width (cm) 0.2 cm 07/26/23 0843   Wound Depth (cm) 0.1 cm 07/26/23 0843   Wound Surface Area (cm^2) 0.04 cm^2 07/26/23 0843   Wound Volume (cm^3) 0.004 cm^3 07/26/23 0843   Calculated Wound Volume (cm^3) 0 cm^3 07/26/23 0843   Change in Wound Size % 100 07/26/23 0843   Drainage Amount Small 07/26/23 0843   Drainage Description Serous 07/26/23 0843   Non-staged Wound Description Full thickness 07/26/23 0843   Dressing Status Intact 07/26/23 0843       Wound 03/07/22 Surgical Leg Left;Upper;Medial (Active)   Date First Assessed/Time First Assessed: 03/07/22 0833   Primary Wound Type: Surgical  Location: Leg  Wound Location Orientation: Left;Upper;Medial  Wound Outcome: (c)        Wound 04/11/22 Incision Abdomen Left;Medial;Lower (Active)   Date First Assessed: 04/11/22   Primary Wound Type: Incision  Location: Abdomen  Wound Location Orientation: Left;Medial;Lower       Wound 06/01/23 Thigh Left; Anterior (Active)   Date First Assessed/Time First Assessed: 06/01/23 0855   Location: Thigh  Wound Location Orientation: Left; Anterior       Wound 06/01/23 Thigh Medial;Right (Active)   Date First Assessed/Time First Assessed: 06/01/23 0856   Location: Thigh  Wound Location Orientation: Medial;Right       [REMOVED] Wound 05/26/21 Incision Thigh Left (Removed)   Resolved Date: 03/07/22  Date First Assessed/Time First Assessed: 05/26/21 1914   Pre-Existing Wound: Yes  Primary Wound Type: Incision  Location: Thigh  Wound Location Orientation: Left  Wound Description (Comments): 2 incisions  Incision's 1st Dress. .. [REMOVED] Wound 05/26/21 Surgical Other (Comment) Groin Left (Removed)   Resolved Date: 03/07/22  Date First Assessed/Time First Assessed: 05/26/21 1916   Pre-Existing Wound: Yes  Primary Wound Type: Surgical  Traumatic Wound Type: (c) Other (Comment)  Location: Groin  Wound Location Orientation: Left  Wound Outcome: Unkno. .. [REMOVED] Wound 06/01/21 Other (comment) Cellulitis Pretibial Left (Removed)   Resolved Date: 03/07/22  Date First Assessed/Time First Assessed: 06/01/21 1930   Pre-Existing Wound: Yes  Primary Wound Type: Other (comment)  Traumatic Wound Type: Cellulitis  Location: Pretibial  Wound Location Orientation: Left  Dressing Status: C...        [REMOVED] Wound 06/02/21 Hip Anterior;Right (Removed)   Resolved Date: 03/07/22  Date First Assessed/Time First Assessed: 06/02/21 1028   Location: Hip  Wound Location Orientation: Anterior;Right  Wound Outcome: Unknown (No longer present)       [REMOVED] Wound 06/02/21 Sacrum (Removed)   Resolved Date: 03/07/22  Date First Assessed/Time First Assessed: 06/02/21 1034   Location: Sacrum  Wound Outcome: Unknown (No longer present)       [REMOVED] Wound 07/06/21 Leg Left (Removed)   Resolved Date: 03/07/22  Date First Assessed/Time First Assessed: 07/06/21 1639   Location: Leg  Wound Location Orientation: Left  Incision's 1st Dressing: KERLIX SUPER SPONGE 6 IN (x2), ACE WRAP 6 IN UNSTERILE (x4), SPONGE GAUZE 4 X 4 16 PLY STRL FÁTIMA. .. [REMOVED] Wound 03/30/22 Sacrum (Removed)   Resolved Date: 04/11/22  Date First Assessed/Time First Assessed: 03/30/22 1610   Location: Sacrum  Wound Description (Comments): 4/1/22 Stage 2 POA  Wound Outcome: (c) Other (Comment)       [REMOVED] Wound 03/30/22 Surgical Thigh Anterior; Left (Removed)   Resolved Date: 04/11/22  Date First Assessed/Time First Assessed: 03/30/22 1614   Pre-Existing Wound: Yes  Primary Wound Type: Surgical  Location: Thigh  Wound Location Orientation: Anterior; Left  Wound Description (Comments): Surgical Wound  Dressing. .. [REMOVED] Wound 03/30/22 Abdomen Left (Removed)   Resolved Date: 04/11/22  Date First Assessed/Time First Assessed: 03/30/22 1616   Location: Abdomen  Wound Location Orientation: Left  Wound Outcome: (c) Other (Comment)       [REMOVED] Wound 03/30/22 Thigh Anterior;Right (Removed)   Resolved Date: 04/11/22  Date First Assessed/Time First Assessed: 03/30/22 1617   Location: Thigh  Wound Location Orientation: Anterior;Right  Wound Outcome: (c) Other (Comment)       [REMOVED] Wound 03/30/22 Abscess Cellulitis Thigh Anterior;Left;Proximal (Removed)   Resolved Date: 04/11/22  Date First Assessed/Time First Assessed: 03/30/22 1618   Primary Wound Type: Abscess  Traumatic Wound Type: Cellulitis  Location: Thigh  Wound Location Orientation: Anterior;Left;Proximal  Dressing Status: New drainage  Wound . ..        [REMOVED] Wound 05/04/22 Leg Left;Inner; Lower (Removed)   Resolved Date: 06/21/22  Date First Assessed/Time First Assessed: 05/04/22 2200   Pre-Existing Wound: Yes  Location: Leg  Wound Location Orientation: Left; Inner; Lower  Wound Outcome: (c) Other (Comment)       [REMOVED] Wound 05/04/22 Other (comment) Sacrum (Removed)   Resolved Date: 06/21/22  Date First Assessed/Time First Assessed: 05/04/22 0001   Pre-Existing Wound: Yes  Primary Wound Type: Other (comment)  Location: Sacrum  Wound Description (Comments): Pt states wound is closed/healed & declined assessment 6/21. .. [REMOVED] Wound 08/23/22 Incision Thigh Left;Proximal;Medial (Removed)   Resolved Date: 01/10/23  Date First Assessed: 08/23/22   Primary Wound Type: Incision  Location: Thigh  Wound Location Orientation: Left;Proximal;Medial  Wound Outcome: Healed       Subjective:      .    71-year-old male with past medical history of hydradenitis suppurativa currently on Humira following with dermatology at [facility] here today for follow-up care of abdominal wound, right lower extremity wound, left lower extremity upper thigh nonhealing surgical wound. Has been using clindamycin and silver alginate as directed. The following portions of the patient's history were reviewed and updated as appropriate: allergies, current medications, past family history, past medical history, past social history, past surgical history and problem list.    Review of Systems   Skin: Positive for wound. All other systems reviewed and are negative. Objective:       Wound 03/07/22 Surgical Leg Left;Upper;Medial (Active)   Wound Image Images linked 07/26/23 0846   Wound Description Pink; Hypergranulation;Granulation tissue; Yellow;Slough 07/26/23 0846   Sarika-wound Assessment Scar Tissue; Maceration; Induration;Pink 07/26/23 0846   Wound Length (cm) 2.4 cm 07/26/23 0846   Wound Width (cm) 3 cm 07/26/23 0846   Wound Depth (cm) 0.2 cm 07/26/23 0846   Wound Surface Area (cm^2) 7.2 cm^2 07/26/23 0846   Wound Volume (cm^3) 1.44 cm^3 07/26/23 0846   Calculated Wound Volume (cm^3) 1.44 cm^3 07/26/23 0846   Change in Wound Size % 75.51 07/26/23 0846   Drainage Amount Small 07/26/23 0846   Drainage Description Serous 07/26/23 0846   Non-staged Wound Description Full thickness 07/26/23 0846   Dressing Status Intact 07/26/23 0846       Wound 04/11/22 Incision Abdomen Left;Medial;Lower (Active)   Wound Image Images linked 07/26/23 0841   Wound Description Epithelialization;Pink;Granulation tissue 07/26/23 0841   Sarika-wound Assessment Scar Tissue; Induration; Purple 07/26/23 0841   Wound Length (cm) 3 cm 07/26/23 0841   Wound Width (cm) 1.5 cm 07/26/23 0841   Wound Depth (cm) 0.1 cm 07/26/23 0841   Wound Surface Area (cm^2) 4.5 cm^2 07/26/23 0841   Wound Volume (cm^3) 0.45 cm^3 07/26/23 0841   Calculated Wound Volume (cm^3) 0.45 cm^3 07/26/23 0841   Change in Wound Size % -181.25 07/26/23 0841   Drainage Amount Small 07/26/23 0841   Drainage Description Yellow;Clear 07/26/23 0841   Non-staged Wound Description Full thickness 07/26/23 0841   Dressing Status Intact 07/26/23 0841       Wound 06/01/23 Thigh Left; Anterior (Active)   Wound Image Images linked 07/26/23 0849   Wound Description Epithelialization;Pink;Yellow;Slough 07/26/23 0849   Sarika-wound Assessment Intact;Pink;Maceration; Induration 07/26/23 0849   Wound Length (cm) 1 cm 07/26/23 0849   Wound Width (cm) 3 cm 07/26/23 0849   Wound Depth (cm) 0.2 cm 07/26/23 0849   Wound Surface Area (cm^2) 3 cm^2 07/26/23 0849   Wound Volume (cm^3) 0.6 cm^3 07/26/23 0849   Calculated Wound Volume (cm^3) 0.6 cm^3 07/26/23 0849   Drainage Amount Small 07/26/23 0849   Drainage Description Serous 07/26/23 0849   Dressing Status Intact 07/26/23 0849       Wound 06/01/23 Thigh Medial;Right (Active)   Wound Image Images linked 07/26/23 0844   Wound Description Pink;Granulation tissue;Slough; Epithelialization; White 07/26/23 0843   Sarika-wound Assessment Edema; Hyperpigmented; Purple 07/26/23 0843   Wound Length (cm) 0.2 cm 07/26/23 0843   Wound Width (cm) 0.2 cm 07/26/23 0843   Wound Depth (cm) 0.1 cm 07/26/23 0843   Wound Surface Area (cm^2) 0.04 cm^2 07/26/23 0843   Wound Volume (cm^3) 0.004 cm^3 07/26/23 0843   Calculated Wound Volume (cm^3) 0 cm^3 07/26/23 0843   Change in Wound Size % 100 07/26/23 0843   Drainage Amount Small 07/26/23 0843   Drainage Description Serous 07/26/23 0843   Non-staged Wound Description Full thickness 07/26/23 0843   Dressing Status Intact 07/26/23 0843       /83   Pulse 70   Temp (!) 97.4 °F (36.3 °C)   Resp 16     Physical Exam  Vitals reviewed. Constitutional:       Appearance: Normal appearance. He is obese. HENT:      Head: Normocephalic and atraumatic. Eyes:      Extraocular Movements: Extraocular movements intact. Pulmonary:      Effort: Pulmonary effort is normal.   Musculoskeletal:      Cervical back: Neck supple. Skin:     Comments: Wounds of abdomen almost completely healed. Some serous drainage. Rounding skin changing and thickening from prior abscesses    Right thigh wound almost closed today. Serosanguineous drainage. Surrounding skin changes and thickening from prior abscesses    Both left surgical thigh wound smaller today. There is sanguinous drainage. Some bordering maceration. Neurological:      Mental Status: He is alert. Psychiatric:         Mood and Affect: Mood normal.                         Wound Instructions:  Orders Placed This Encounter   Procedures   • Wound cleansing and dressings     Left Thigh Wounds (Surgical Wound)     Wash your hands with soap and water. Remove old dressing, discard into plastic bag and place in trash. Cleanse the wound with soap and water  prior to applying a clean dressing. Do not use tissue or cotton balls. Do not scrub the wound. Pat dry using gauze. Shower yes with wounds covered. Apply skin prep to skin surrounding wound  Apply Polymem the left thigh wounds.   Cover with ABD  Secure with tape. Change dressing three times per week.     This was done today. •     Standing Status:   Future     Standing Expiration Date:   7/26/2024   • Wound cleansing and dressings     Abdominal wounds,  Right Thigh Wounds:          Apply warm compresses to the abdominal and thigh wound 1- 2x/day until compress cools down.    Wash your hands with soap and water. Kathleen  old dressing, discard into plastic bag and place in trash.  Cleanse the wound with soap and water prior to applying a clean dressing. Do not use tissue or cotton balls. Do not scrub the wound. Pat dry using gauze. Shower yes - with wounds covered   Apply Nystatin powder to abdominal skin fold   Apply Clindamycin ointment to the wounds as recommended by Dermatology. Then apply Adaptic gauze to wounds. Cover with gauze and secure with medipore tape. Change dressing daily.      This was done today  (Clindamycin ointment was not applied)        Standing Status:   Future     Standing Expiration Date:   7/26/2024   • Debridement     This order was created via procedure documentation   • Debridement     This order was created via procedure documentation        Diagnosis ICD-10-CM Associated Orders   1. Hidradenitis suppurativa  L73.2       2. Abscess of skin of abdomen  L02.211 lidocaine (XYLOCAINE) 4 % topical solution 5 mL     Wound cleansing and dressings     Wound cleansing and dressings     clindamycin (CLINDAGEL) 1 % gel      3. Non-healing surgical wound, initial encounter  T81.89XA lidocaine (XYLOCAINE) 4 % topical solution 5 mL     Wound cleansing and dressings     Wound cleansing and dressings     Debridement     Debridement      4. Type 2 diabetes mellitus with diabetic polyneuropathy, without long-term current use of insulin (HCC)  E11.42       5. Morbid obesity with body mass index (BMI) of 50.0 to 59.9 in adult ([facility])  E66.01     Z68.43       6.  Lymphedema of both lower extremities  I89.0            --  [facility] MD    "This note has been constructed using a voice recognition system. Therefore there may be syntax, spelling, and/or grammatical errors. Occasional wrong word or "sound alike" substitutions may have occurred due to the inherent limitations of voice recognition software. Read the chart carefully and recognize, using context, where substitutions have occurred.  Please call if you have any questions."

## 2023-08-11 PROBLEM — R19.09 LEFT GROIN MASS: Status: RESOLVED | Noted: 2020-11-29 | Resolved: 2023-08-11

## 2023-08-11 PROBLEM — T88.4XXA DIFFICULT INTUBATION: Status: RESOLVED | Noted: 2021-05-28 | Resolved: 2023-08-11

## 2023-08-11 PROBLEM — I21.4 TYPE 1 NON-ST ELEVATION MYOCARDIAL INFARCTION (NSTEMI) (HCC): Status: RESOLVED | Noted: 2020-11-29 | Resolved: 2023-08-11

## 2023-08-19 DIAGNOSIS — I10 ESSENTIAL HYPERTENSION: ICD-10-CM

## 2023-08-24 RX ORDER — CARVEDILOL 6.25 MG/1
6.25 TABLET ORAL 2 TIMES DAILY WITH MEALS
Qty: 60 TABLET | Refills: 0 | Status: SHIPPED | OUTPATIENT
Start: 2023-08-24

## 2023-08-24 NOTE — TELEPHONE ENCOUNTER
Sent 60 day supply, please schedule for OVL for uncontrolled DM and other chronic conditions, and establish with new pcp. Please advise conditions that he will need to be seen in the office for any further prescriptions.   Thanks, Jeanne Ramirez

## 2023-09-06 ENCOUNTER — OFFICE VISIT (OUTPATIENT)
Dept: WOUND CARE | Facility: HOSPITAL | Age: 43
End: 2023-09-06
Payer: COMMERCIAL

## 2023-09-06 VITALS
SYSTOLIC BLOOD PRESSURE: 111 MMHG | DIASTOLIC BLOOD PRESSURE: 72 MMHG | RESPIRATION RATE: 15 BRPM | TEMPERATURE: 97.2 F | HEART RATE: 72 BPM

## 2023-09-06 DIAGNOSIS — S71.101A OPEN WOUND OF RIGHT THIGH, INITIAL ENCOUNTER: ICD-10-CM

## 2023-09-06 DIAGNOSIS — T81.89XA NON-HEALING SURGICAL WOUND, INITIAL ENCOUNTER: ICD-10-CM

## 2023-09-06 DIAGNOSIS — E11.42 TYPE 2 DIABETES MELLITUS WITH DIABETIC POLYNEUROPATHY, WITHOUT LONG-TERM CURRENT USE OF INSULIN (HCC): ICD-10-CM

## 2023-09-06 DIAGNOSIS — L02.211 ABSCESS OF SKIN OF ABDOMEN: ICD-10-CM

## 2023-09-06 DIAGNOSIS — L73.2 HIDRADENITIS SUPPURATIVA: Primary | ICD-10-CM

## 2023-09-06 DIAGNOSIS — S71.102A OPEN WOUND OF LEFT THIGH, INITIAL ENCOUNTER: ICD-10-CM

## 2023-09-06 DIAGNOSIS — L30.4 INTERTRIGINOUS DERMATITIS ASSOCIATED WITH MOISTURE: ICD-10-CM

## 2023-09-06 PROCEDURE — 97597 DBRDMT OPN WND 1ST 20 CM/<: CPT | Performed by: STUDENT IN AN ORGANIZED HEALTH CARE EDUCATION/TRAINING PROGRAM

## 2023-09-06 RX ORDER — LIDOCAINE HYDROCHLORIDE 40 MG/ML
5 SOLUTION TOPICAL ONCE
Status: COMPLETED | OUTPATIENT
Start: 2023-09-06 | End: 2023-09-06

## 2023-09-06 RX ADMIN — LIDOCAINE HYDROCHLORIDE 5 ML: 40 SOLUTION TOPICAL at 09:05

## 2023-09-06 NOTE — PROGRESS NOTES
Patient ID: Moisés Cao is a 44 y.o. male Date of Birth 1980     Chief Complaint  Chief Complaint   Patient presents with   • Follow Up Wound Care Visit     Abdomen, RLE and LLE       Allergies  Amoxicillin    Assessment:     Diagnoses and all orders for this visit:    Hidradenitis suppurativa  -     lidocaine (XYLOCAINE) 4 % topical solution 5 mL  -     Wound cleansing and dressings; Future  -     Wound cleansing and dressings; Future    Open wound of right thigh, initial encounter  -     Debridement    Abscess of skin of abdomen  -     lidocaine (XYLOCAINE) 4 % topical solution 5 mL  -     Wound cleansing and dressings; Future  -     Wound cleansing and dressings; Future    Non-healing surgical wound, initial encounter  -     Debridement    Type 2 diabetes mellitus with diabetic polyneuropathy, without long-term current use of insulin (HCC)  -     lidocaine (XYLOCAINE) 4 % topical solution 5 mL  -     Wound cleansing and dressings; Future  -     Wound cleansing and dressings; Future    Intertriginous dermatitis associated with moisture    Open wound of left thigh, initial encounter  -     Debridement              Debridement   Wound 03/07/22 Surgical Leg Left;Upper;Medial    Universal Protocol:  Consent: Verbal consent obtained. Risks and benefits: risks, benefits and alternatives were discussed  Consent given by: patient  Time out: Immediately prior to procedure a "time out" was called to verify the correct patient, procedure, equipment, support staff and site/side marked as required.   Patient identity confirmed: verbally with patient      Performed by: physician  Debridement type: selective  Pain control: lidocaine 4%  Pre-debridement measurements  Length (cm): 2.5  Width (cm): 2.7  Depth (cm): 0.2  Surface Area (cm^2): 6.75  Volume (cm^3): 1.35    Post-debridement measurements  Length (cm): 2.5  Width (cm): 2.7  Depth (cm): 0.2  Percent debrided: 80%  Surface Area (cm^2): 6.75  Area debrided (cm^2): 5.4  Volume (cm^3): 1.35  Devitalized tissue debrided: biofilm, exudate and slough  Instrument(s) utilized: curette  Bleeding: small  Hemostasis obtained with: pressure  Procedural pain (0-10): 1  Post-procedural pain: 0   Response to treatment: procedure was tolerated well    Debridement   Wound 06/01/23 Thigh Left; Anterior    Universal Protocol:  Consent: Verbal consent obtained. Risks and benefits: risks, benefits and alternatives were discussed  Consent given by: patient  Time out: Immediately prior to procedure a "time out" was called to verify the correct patient, procedure, equipment, support staff and site/side marked as required. Patient identity confirmed: verbally with patient      Performed by: physician  Debridement type: selective  Pain control: lidocaine 4%  Pre-debridement measurements  Length (cm): 1.5  Width (cm): 3  Depth (cm): 0.2  Surface Area (cm^2): 4.5  Volume (cm^3): 0.9    Post-debridement measurements  Length (cm): 1.5  Width (cm): 3  Depth (cm): 0.2  Percent debrided: 90%  Surface Area (cm^2): 4.5  Area debrided (cm^2): 4.05  Volume (cm^3): 0.9  Devitalized tissue debrided: biofilm and exudate  Instrument(s) utilized: curette  Bleeding: small  Hemostasis obtained with: pressure  Procedural pain (0-10): 1  Post-procedural pain: 0   Response to treatment: procedure was tolerated well    Debridement   Wound 06/01/23 Thigh Medial;Right    Universal Protocol:  Consent: Verbal consent obtained. Risks and benefits: risks, benefits and alternatives were discussed  Consent given by: patient  Time out: Immediately prior to procedure a "time out" was called to verify the correct patient, procedure, equipment, support staff and site/side marked as required.   Patient identity confirmed: verbally with patient      Performed by: physician  Debridement type: selective  Pain control: lidocaine 4%  Pre-debridement measurements  Length (cm): 2.5  Width (cm): 1.5  Depth (cm): 0.1  Surface Area (cm^2): 3.75  Volume (cm^3): 0.38    Post-debridement measurements  Length (cm): 2.5  Width (cm): 1.5  Depth (cm): 0.1  Percent debrided: 90%  Surface Area (cm^2): 3.75  Area debrided (cm^2): 3.38  Volume (cm^3): 0.38  Devitalized tissue debrided: biofilm, exudate and slough  Instrument(s) utilized: curette  Bleeding: small  Hemostasis obtained with: pressure  Procedural pain (0-10): 1  Post-procedural pain: 0   Response to treatment: procedure was tolerated well          Plan:  • It was a pleasure to see Silvano Muhammad for wound care follow up today  • Selective debridement performed today as above to right and left leg medial thigh wounds. Wounds of the abdomen cleansed with saline and gauze today. • Wounds are improving   • Continue plan of care as noted below with clindamycin and Adaptic to right medial thigh wounds and abdominal wounds. Left thigh wound to be treated with PolyMem with silver  • Continue to follow dermatology, Dr. Blanco Weir, for follow-up as you are on Humira injections. • Continue to work on weight loss and healthy eating  •  A1C results reviewed with the patient today. • No signs or symptoms of infection today. Patient understands that if any signs of infection start (such as increased redness, drainage, pain, fever, chills, diaphoresis), they should call our office or proceed to the ER or Urgent Care. • Patient should continue a high protein diet to facilitate wound healing  • Patient is advised to not submerge wound or leave wound open to air. • Follow up in 3 weeks  • Given the multi-factorial nature of wound care, additional time was taken to review patient's treatment plan with other specialties and most recent pertinent lab work and imaging. • All plans of care discussed with patient at bedside who verbalized understanding with treatment plan.        Wound 03/07/22 Surgical Leg Left;Upper;Medial (Active)   Wound Image Images linked 09/06/23 0901   Wound Description Pink;Hypergranulation;Yellow;Slough 09/06/23 0901   Sarika-wound Assessment Scar Tissue; Maceration; Induration;Pink 09/06/23 0901   Wound Length (cm) 2.5 cm 09/06/23 0901   Wound Width (cm) 2.7 cm 09/06/23 0901   Wound Depth (cm) 0.2 cm 09/06/23 0901   Wound Surface Area (cm^2) 6.75 cm^2 09/06/23 0901   Wound Volume (cm^3) 1.35 cm^3 09/06/23 0901   Calculated Wound Volume (cm^3) 1.35 cm^3 09/06/23 0901   Change in Wound Size % 77.04 09/06/23 0901   Drainage Amount Small 09/06/23 0901   Drainage Description Serous; Yellow 09/06/23 0901   Non-staged Wound Description Full thickness 09/06/23 0901   Dressing Status Intact (upon arrival) 09/06/23 0901       Wound 04/11/22 Incision Abdomen Left;Medial;Lower (Active)   Wound Image Images linked 09/06/23 0852   Wound Description Epithelialization;Pink;Granulation tissue 09/06/23 0851   Sarika-wound Assessment Scar Tissue; Induration; Purple 09/06/23 0851   Wound Length (cm) 3 cm 09/06/23 0851   Wound Width (cm) 3 cm 09/06/23 0851   Wound Depth (cm) 0.1 cm 09/06/23 0851   Wound Surface Area (cm^2) 9 cm^2 09/06/23 0851   Wound Volume (cm^3) 0.9 cm^3 09/06/23 0851   Calculated Wound Volume (cm^3) 0.9 cm^3 09/06/23 0851   Change in Wound Size % -462.5 09/06/23 0851   Drainage Amount Small 09/06/23 0851   Drainage Description Yellow;Clear 09/06/23 0851   Non-staged Wound Description Full thickness 09/06/23 0851   Dressing Status Intact (upon arrival) 09/06/23 0851       Wound 06/01/23 Thigh Left; Anterior (Active)   Wound Image Images linked 09/06/23 0858   Wound Description Pink;Yellow;Slough 09/06/23 0858   Sarika-wound Assessment Intact;Pink;Maceration; Induration 09/06/23 0858   Wound Length (cm) 1.5 cm 09/06/23 0858   Wound Width (cm) 3 cm 09/06/23 0858   Wound Depth (cm) 0.2 cm 09/06/23 0858   Wound Surface Area (cm^2) 4.5 cm^2 09/06/23 0858   Wound Volume (cm^3) 0.9 cm^3 09/06/23 0858   Calculated Wound Volume (cm^3) 0.9 cm^3 09/06/23 0858   Drainage Amount Small 09/06/23 0858 Drainage Description Serous 09/06/23 0858   Non-staged Wound Description Not applicable 38/41/62 5137   Dressing Status Intact (upon arrival) 09/06/23 0858       Wound 06/01/23 Thigh Medial;Right (Active)   Wound Image Images linked 09/06/23 0855   Wound Description Pink;Granulation tissue;Slough; Epithelialization; White 09/06/23 0854   Sarika-wound Assessment Edema; Hyperpigmented; Purple 09/06/23 0854   Wound Length (cm) 2.5 cm 09/06/23 0854   Wound Width (cm) 1.5 cm 09/06/23 0854   Wound Depth (cm) 0.1 cm 09/06/23 0854   Wound Surface Area (cm^2) 3.75 cm^2 09/06/23 0854   Wound Volume (cm^3) 0.375 cm^3 09/06/23 0854   Calculated Wound Volume (cm^3) 0.38 cm^3 09/06/23 0854   Change in Wound Size % 89.2 09/06/23 0854   Drainage Amount Small 09/06/23 0854   Drainage Description Yellow;Clear 09/06/23 0854   Non-staged Wound Description Full thickness 09/06/23 0854   Dressing Status Intact (upon arrival) 09/06/23 0854       Wound 03/07/22 Surgical Leg Left;Upper;Medial (Active)   Date First Assessed/Time First Assessed: 03/07/22 0833   Primary Wound Type: Surgical  Location: Leg  Wound Location Orientation: Left;Upper;Medial  Wound Outcome: (c)        Wound 04/11/22 Incision Abdomen Left;Medial;Lower (Active)   Date First Assessed: 04/11/22   Primary Wound Type: Incision  Location: Abdomen  Wound Location Orientation: Left;Medial;Lower       Wound 06/01/23 Thigh Left; Anterior (Active)   Date First Assessed/Time First Assessed: 06/01/23 0855   Location: Thigh  Wound Location Orientation: Left; Anterior       Wound 06/01/23 Thigh Medial;Right (Active)   Date First Assessed/Time First Assessed: 06/01/23 0856   Location: Thigh  Wound Location Orientation: Medial;Right       [REMOVED] Wound 05/26/21 Incision Thigh Left (Removed)   Resolved Date: 03/07/22  Date First Assessed/Time First Assessed: 05/26/21 1914   Pre-Existing Wound: Yes  Primary Wound Type:  Incision  Location: Thigh  Wound Location Orientation: Left  Wound Description (Comments): 2 incisions  Incision's 1st Dress. .. [REMOVED] Wound 05/26/21 Surgical Other (Comment) Groin Left (Removed)   Resolved Date: 03/07/22  Date First Assessed/Time First Assessed: 05/26/21 1916   Pre-Existing Wound: Yes  Primary Wound Type: Surgical  Traumatic Wound Type: (c) Other (Comment)  Location: Groin  Wound Location Orientation: Left  Wound Outcome: Unkno. .. [REMOVED] Wound 06/01/21 Other (comment) Cellulitis Pretibial Left (Removed)   Resolved Date: 03/07/22  Date First Assessed/Time First Assessed: 06/01/21 1930   Pre-Existing Wound: Yes  Primary Wound Type: Other (comment)  Traumatic Wound Type: Cellulitis  Location: Pretibial  Wound Location Orientation: Left  Dressing Status: C... [REMOVED] Wound 06/02/21 Hip Anterior;Right (Removed)   Resolved Date: 03/07/22  Date First Assessed/Time First Assessed: 06/02/21 1028   Location: Hip  Wound Location Orientation: Anterior;Right  Wound Outcome: Unknown (No longer present)       [REMOVED] Wound 06/02/21 Sacrum (Removed)   Resolved Date: 03/07/22  Date First Assessed/Time First Assessed: 06/02/21 1034   Location: Sacrum  Wound Outcome: Unknown (No longer present)       [REMOVED] Wound 07/06/21 Leg Left (Removed)   Resolved Date: 03/07/22  Date First Assessed/Time First Assessed: 07/06/21 1639   Location: Leg  Wound Location Orientation: Left  Incision's 1st Dressing: KERLIX SUPER SPONGE 6 IN (x2), ACE WRAP 6 IN UNSTERILE (x4), SPONGE GAUZE 4 X 4 16 PLY STRL FÁTIMA. .. [REMOVED] Wound 03/30/22 Sacrum (Removed)   Resolved Date: 04/11/22  Date First Assessed/Time First Assessed: 03/30/22 1610   Location: Sacrum  Wound Description (Comments): 4/1/22 Stage 2 POA  Wound Outcome: (c) Other (Comment)       [REMOVED] Wound 03/30/22 Surgical Thigh Anterior; Left (Removed)   Resolved Date: 04/11/22  Date First Assessed/Time First Assessed: 03/30/22 1614   Pre-Existing Wound: Yes  Primary Wound Type: Surgical  Location: Thigh  Wound Location Orientation: Anterior; Left  Wound Description (Comments): Surgical Wound  Dressing. .. [REMOVED] Wound 03/30/22 Abdomen Left (Removed)   Resolved Date: 04/11/22  Date First Assessed/Time First Assessed: 03/30/22 1616   Location: Abdomen  Wound Location Orientation: Left  Wound Outcome: (c) Other (Comment)       [REMOVED] Wound 03/30/22 Thigh Anterior;Right (Removed)   Resolved Date: 04/11/22  Date First Assessed/Time First Assessed: 03/30/22 1617   Location: Thigh  Wound Location Orientation: Anterior;Right  Wound Outcome: (c) Other (Comment)       [REMOVED] Wound 03/30/22 Abscess Cellulitis Thigh Anterior;Left;Proximal (Removed)   Resolved Date: 04/11/22  Date First Assessed/Time First Assessed: 03/30/22 1618   Primary Wound Type: Abscess  Traumatic Wound Type: Cellulitis  Location: Thigh  Wound Location Orientation: Anterior;Left;Proximal  Dressing Status: New drainage  Wound . .. [REMOVED] Wound 05/04/22 Leg Left; Inner; Lower (Removed)   Resolved Date: 06/21/22  Date First Assessed/Time First Assessed: 05/04/22 2200   Pre-Existing Wound: Yes  Location: Leg  Wound Location Orientation: Left; Inner; Lower  Wound Outcome: (c) Other (Comment)       [REMOVED] Wound 05/04/22 Other (comment) Sacrum (Removed)   Resolved Date: 06/21/22  Date First Assessed/Time First Assessed: 05/04/22 0001   Pre-Existing Wound: Yes  Primary Wound Type: Other (comment)  Location: Sacrum  Wound Description (Comments): Pt states wound is closed/healed & declined assessment 6/21. .. [REMOVED] Wound 08/23/22 Incision Thigh Left;Proximal;Medial (Removed)   Resolved Date: 01/10/23  Date First Assessed: 08/23/22   Primary Wound Type: Incision  Location: Thigh  Wound Location Orientation: Left;Proximal;Medial  Wound Outcome: Healed       Subjective:       .       28-year-old male with past medical history of hydradenitis suppurativa currently on Humira following with dermatology at [facility] here today for follow-up care of abdominal wound right and left medial thigh wounds. .  Has been using clindamycin and polymem as directed. Denies any local or systemic signs of infection today. The following portions of the patient's history were reviewed and updated as appropriate: allergies, current medications, past family history, past medical history, past social history, past surgical history and problem list.    Review of Systems   Constitutional: Negative for chills, diaphoresis, fatigue and fever. Skin: Positive for wound. All other systems reviewed and are negative. Objective:       Wound 03/07/22 Surgical Leg Left;Upper;Medial (Active)   Wound Image Images linked 09/06/23 0901   Wound Description Pink; Hypergranulation;Yellow;Slough 09/06/23 0901   Sarika-wound Assessment Scar Tissue; Maceration; Induration;Pink 09/06/23 0901   Wound Length (cm) 2.5 cm 09/06/23 0901   Wound Width (cm) 2.7 cm 09/06/23 0901   Wound Depth (cm) 0.2 cm 09/06/23 0901   Wound Surface Area (cm^2) 6.75 cm^2 09/06/23 0901   Wound Volume (cm^3) 1.35 cm^3 09/06/23 0901   Calculated Wound Volume (cm^3) 1.35 cm^3 09/06/23 0901   Change in Wound Size % 77.04 09/06/23 0901   Drainage Amount Small 09/06/23 0901   Drainage Description Serous; Yellow 09/06/23 0901   Non-staged Wound Description Full thickness 09/06/23 0901   Dressing Status Intact (upon arrival) 09/06/23 0901       Wound 04/11/22 Incision Abdomen Left;Medial;Lower (Active)   Wound Image Images linked 09/06/23 0852   Wound Description Epithelialization;Pink;Granulation tissue 09/06/23 0851   Sarika-wound Assessment Scar Tissue; Induration; Purple 09/06/23 0851   Wound Length (cm) 3 cm 09/06/23 0851   Wound Width (cm) 3 cm 09/06/23 0851   Wound Depth (cm) 0.1 cm 09/06/23 0851   Wound Surface Area (cm^2) 9 cm^2 09/06/23 0851   Wound Volume (cm^3) 0.9 cm^3 09/06/23 0851   Calculated Wound Volume (cm^3) 0.9 cm^3 09/06/23 0851   Change in Wound Size % -462.5 09/06/23 0851   Drainage Amount Small 09/06/23 5133   Drainage Description Yellow;Clear 09/06/23 0851   Non-staged Wound Description Full thickness 09/06/23 0851   Dressing Status Intact (upon arrival) 09/06/23 0851       Wound 06/01/23 Thigh Left; Anterior (Active)   Wound Image Images linked 09/06/23 0858   Wound Description Pink;Yellow;Slough 09/06/23 0858   Sarika-wound Assessment Intact;Pink;Maceration; Induration 09/06/23 0858   Wound Length (cm) 1.5 cm 09/06/23 0858   Wound Width (cm) 3 cm 09/06/23 0858   Wound Depth (cm) 0.2 cm 09/06/23 0858   Wound Surface Area (cm^2) 4.5 cm^2 09/06/23 0858   Wound Volume (cm^3) 0.9 cm^3 09/06/23 0858   Calculated Wound Volume (cm^3) 0.9 cm^3 09/06/23 0858   Drainage Amount Small 09/06/23 0858   Drainage Description Serous 09/06/23 0858   Non-staged Wound Description Not applicable 51/95/89 3701   Dressing Status Intact (upon arrival) 09/06/23 0858       Wound 06/01/23 Thigh Medial;Right (Active)   Wound Image Images linked 09/06/23 0855   Wound Description Pink;Granulation tissue;Slough; Epithelialization; White 09/06/23 0854   Sarika-wound Assessment Edema; Hyperpigmented; Purple 09/06/23 0854   Wound Length (cm) 2.5 cm 09/06/23 0854   Wound Width (cm) 1.5 cm 09/06/23 0854   Wound Depth (cm) 0.1 cm 09/06/23 0854   Wound Surface Area (cm^2) 3.75 cm^2 09/06/23 0854   Wound Volume (cm^3) 0.375 cm^3 09/06/23 0854   Calculated Wound Volume (cm^3) 0.38 cm^3 09/06/23 0854   Change in Wound Size % 89.2 09/06/23 0854   Drainage Amount Small 09/06/23 0854   Drainage Description Yellow;Clear 09/06/23 0854   Non-staged Wound Description Full thickness 09/06/23 0854   Dressing Status Intact (upon arrival) 09/06/23 0854       /72   Pulse 72   Temp (!) 97.2 °F (36.2 °C)   Resp 15     Physical Exam  Vitals reviewed. Constitutional:       General: He is not in acute distress. Appearance: Normal appearance. He is obese. He is not ill-appearing or toxic-appearing. HENT:      Head: Normocephalic and atraumatic.    Eyes: Extraocular Movements: Extraocular movements intact. Pulmonary:      Effort: Pulmonary effort is normal.   Musculoskeletal:      Cervical back: Neck supple. Skin:     Comments: Large abdominal pannus with small or areas of opening and serosanguineous drainage at the base of pannus and underneath pannus. Irregular hearing and scar tissue noted underneath the wounds. No fluctuance noted. Right medial thigh wound smaller than last exam.  Serosanguineous drainage. Some periwound maceration. Left medial thigh wounds with serosanguineous drainage. Periwound maceration. Smaller than last exam.   Neurological:      Mental Status: He is alert. Psychiatric:         Mood and Affect: Mood normal.                         Wound Instructions:  Orders Placed This Encounter   Procedures   • Wound cleansing and dressings     Left Thigh Wounds (Surgical Wound)     Wash your hands with soap and water. Remove old dressing, discard into plastic bag and place in trash. Cleanse the wound with soap and water  prior to applying a clean dressing. Do not use tissue or cotton balls. Do not scrub the wound. Pat dry using gauze. Shower yes with wounds covered. Apply skin prep to skin surrounding wound  Apply Polymem Max AG to the left thigh wounds. Cover with ABD  Apply Nystatin powder to inner thigh. Secure with tape. Change dressing three times per week.     This was done today.                                   Standing Status:   Future     Standing Expiration Date:   9/6/2024   • Wound cleansing and dressings      Abdominal wounds,  Right Thigh Wounds:          Apply warm compresses to the abdominal and thigh wound 1- 2x/day until compress cools down.    Wash your hands with soap and water. Isadore Evens old dressing, discard into plastic bag and place in trash.  Cleanse the wound with soap and water prior to applying a clean dressing. Do not use tissue or cotton balls. Do not scrub the wound. Pat dry using gauze.    Shower yes - with wounds covered   Apply Nystatin powder to right thigh fold. Apply Clindamycin ointment to the wounds as recommended by Dermatology. Then apply Adaptic gauze to wounds. Cover with gauze and secure with medipore tape. Change dressing daily.      This was done today  (Clindamycin ointment was not applied today)     Standing Status:   Future     Standing Expiration Date:   9/6/2024   • Debridement     This order was created via procedure documentation   • Debridement     This order was created via procedure documentation   • Debridement     This order was created via procedure documentation        Diagnosis ICD-10-CM Associated Orders   1. Hidradenitis suppurativa  L73.2 lidocaine (XYLOCAINE) 4 % topical solution 5 mL     Wound cleansing and dressings     Wound cleansing and dressings      2. Open wound of right thigh, initial encounter  S71.101A Debridement      3. Abscess of skin of abdomen  L02.211 lidocaine (XYLOCAINE) 4 % topical solution 5 mL     Wound cleansing and dressings     Wound cleansing and dressings      4. Non-healing surgical wound, initial encounter  T81.89XA Debridement      5. Type 2 diabetes mellitus with diabetic polyneuropathy, without long-term current use of insulin ([facility])  E11.42 lidocaine (XYLOCAINE) 4 % topical solution 5 mL     Wound cleansing and dressings     Wound cleansing and dressings      6. Intertriginous dermatitis associated with moisture  L30.4       7. Open wound of left thigh, initial encounter  S71.102A Debridement        --  Aminata Carlin MD    "This note has been constructed using a voice recognition system. Therefore there may be syntax, spelling, and/or grammatical errors. Occasional wrong word or "sound alike" substitutions may have occurred due to the inherent limitations of voice recognition software. Read the chart carefully and recognize, using context, where substitutions have occurred.  Please call if you have any questions."

## 2023-09-21 DIAGNOSIS — I10 ESSENTIAL HYPERTENSION: ICD-10-CM

## 2023-09-21 RX ORDER — LISINOPRIL 20 MG/1
20 TABLET ORAL DAILY
Qty: 90 TABLET | Refills: 1 | Status: SHIPPED | OUTPATIENT
Start: 2023-09-21

## 2023-10-06 ENCOUNTER — HOSPITAL ENCOUNTER (INPATIENT)
Facility: HOSPITAL | Age: 43
LOS: 4 days | Discharge: HOME | DRG: 871 | End: 2023-10-10
Attending: EMERGENCY MEDICINE | Admitting: FAMILY MEDICINE
Payer: COMMERCIAL

## 2023-10-06 ENCOUNTER — APPOINTMENT (EMERGENCY)
Dept: RADIOLOGY | Facility: HOSPITAL | Age: 43
DRG: 871 | End: 2023-10-06
Payer: COMMERCIAL

## 2023-10-06 DIAGNOSIS — E66.01 MORBID OBESITY WITH BODY MASS INDEX (BMI) OF 50.0 TO 59.9 IN ADULT (HCC): ICD-10-CM

## 2023-10-06 DIAGNOSIS — R11.2 NAUSEA & VOMITING: ICD-10-CM

## 2023-10-06 DIAGNOSIS — I25.10 CORONARY ARTERY DISEASE INVOLVING NATIVE CORONARY ARTERY OF NATIVE HEART WITHOUT ANGINA PECTORIS: ICD-10-CM

## 2023-10-06 DIAGNOSIS — E11.9 DIABETES (HCC): ICD-10-CM

## 2023-10-06 DIAGNOSIS — E11.42 TYPE 2 DIABETES MELLITUS WITH DIABETIC POLYNEUROPATHY, WITHOUT LONG-TERM CURRENT USE OF INSULIN (HCC): ICD-10-CM

## 2023-10-06 DIAGNOSIS — L03.90 CELLULITIS: Primary | ICD-10-CM

## 2023-10-06 DIAGNOSIS — I89.0 LYMPHEDEMA OF BOTH LOWER EXTREMITIES: ICD-10-CM

## 2023-10-06 LAB
ALBUMIN SERPL BCP-MCNC: 3.3 G/DL (ref 3.5–5)
ALP SERPL-CCNC: 79 U/L (ref 34–104)
ALT SERPL W P-5'-P-CCNC: 11 U/L (ref 7–52)
ANION GAP SERPL CALCULATED.3IONS-SCNC: 11 MMOL/L
ANISOCYTOSIS BLD QL SMEAR: PRESENT
AST SERPL W P-5'-P-CCNC: 36 U/L (ref 13–39)
BASOPHILS # BLD MANUAL: 0 THOUSAND/UL (ref 0–0.1)
BASOPHILS NFR MAR MANUAL: 0 % (ref 0–1)
BILIRUB SERPL-MCNC: 0.65 MG/DL (ref 0.2–1)
BUN SERPL-MCNC: 20 MG/DL (ref 5–25)
CALCIUM ALBUM COR SERPL-MCNC: 8.9 MG/DL (ref 8.3–10.1)
CALCIUM SERPL-MCNC: 8.3 MG/DL (ref 8.4–10.2)
CARDIAC TROPONIN I PNL SERPL HS: 13 NG/L
CHLORIDE SERPL-SCNC: 92 MMOL/L (ref 96–108)
CO2 SERPL-SCNC: 27 MMOL/L (ref 21–32)
CREAT SERPL-MCNC: 1.21 MG/DL (ref 0.6–1.3)
EOSINOPHIL # BLD MANUAL: 0 THOUSAND/UL (ref 0–0.4)
EOSINOPHIL NFR BLD MANUAL: 0 % (ref 0–6)
ERYTHROCYTE [DISTWIDTH] IN BLOOD BY AUTOMATED COUNT: 14 % (ref 11.6–15.1)
FLUAV RNA RESP QL NAA+PROBE: NEGATIVE
FLUBV RNA RESP QL NAA+PROBE: NEGATIVE
GFR SERPL CREATININE-BSD FRML MDRD: 73 ML/MIN/1.73SQ M
GLUCOSE SERPL-MCNC: 140 MG/DL (ref 65–140)
GLUCOSE SERPL-MCNC: 155 MG/DL (ref 65–140)
HCT VFR BLD AUTO: 40.5 % (ref 36.5–49.3)
HGB BLD-MCNC: 13.6 G/DL (ref 12–17)
LACTATE SERPL-SCNC: 1.1 MMOL/L (ref 0.5–2)
LYMPHOCYTES # BLD AUTO: 0.32 THOUSAND/UL (ref 0.6–4.47)
LYMPHOCYTES # BLD AUTO: 2 % (ref 14–44)
MCH RBC QN AUTO: 28.8 PG (ref 26.8–34.3)
MCHC RBC AUTO-ENTMCNC: 33.6 G/DL (ref 31.4–37.4)
MCV RBC AUTO: 86 FL (ref 82–98)
MONOCYTES # BLD AUTO: 0.65 THOUSAND/UL (ref 0–1.22)
MONOCYTES NFR BLD: 4 % (ref 4–12)
NEUTROPHILS # BLD MANUAL: 15.21 THOUSAND/UL (ref 1.85–7.62)
NEUTS BAND NFR BLD MANUAL: 13 % (ref 0–8)
NEUTS SEG NFR BLD AUTO: 81 % (ref 43–75)
PLATELET # BLD AUTO: 306 THOUSANDS/UL (ref 149–390)
PLATELET BLD QL SMEAR: ADEQUATE
PMV BLD AUTO: 10 FL (ref 8.9–12.7)
POLYCHROMASIA BLD QL SMEAR: PRESENT
POTASSIUM SERPL-SCNC: 4.9 MMOL/L (ref 3.5–5.3)
PROCALCITONIN SERPL-MCNC: 7.02 NG/ML
PROT SERPL-MCNC: 7.7 G/DL (ref 6.4–8.4)
RBC # BLD AUTO: 4.73 MILLION/UL (ref 3.88–5.62)
RBC MORPH BLD: PRESENT
RSV RNA RESP QL NAA+PROBE: NEGATIVE
SARS-COV-2 RNA RESP QL NAA+PROBE: NEGATIVE
SODIUM SERPL-SCNC: 130 MMOL/L (ref 135–147)
WBC # BLD AUTO: 16.18 THOUSAND/UL (ref 4.31–10.16)

## 2023-10-06 PROCEDURE — 36415 COLL VENOUS BLD VENIPUNCTURE: CPT

## 2023-10-06 PROCEDURE — 96365 THER/PROPH/DIAG IV INF INIT: CPT

## 2023-10-06 PROCEDURE — 82948 REAGENT STRIP/BLOOD GLUCOSE: CPT

## 2023-10-06 PROCEDURE — 99285 EMERGENCY DEPT VISIT HI MDM: CPT

## 2023-10-06 PROCEDURE — 96375 TX/PRO/DX INJ NEW DRUG ADDON: CPT

## 2023-10-06 PROCEDURE — 85007 BL SMEAR W/DIFF WBC COUNT: CPT

## 2023-10-06 PROCEDURE — 96361 HYDRATE IV INFUSION ADD-ON: CPT

## 2023-10-06 PROCEDURE — 0241U HB NFCT DS VIR RESP RNA 4 TRGT: CPT | Performed by: EMERGENCY MEDICINE

## 2023-10-06 PROCEDURE — 80053 COMPREHEN METABOLIC PANEL: CPT

## 2023-10-06 PROCEDURE — 83605 ASSAY OF LACTIC ACID: CPT

## 2023-10-06 PROCEDURE — 85027 COMPLETE CBC AUTOMATED: CPT

## 2023-10-06 PROCEDURE — 96367 TX/PROPH/DG ADDL SEQ IV INF: CPT

## 2023-10-06 PROCEDURE — 84145 PROCALCITONIN (PCT): CPT

## 2023-10-06 PROCEDURE — 73590 X-RAY EXAM OF LOWER LEG: CPT

## 2023-10-06 PROCEDURE — 84484 ASSAY OF TROPONIN QUANT: CPT | Performed by: EMERGENCY MEDICINE

## 2023-10-06 PROCEDURE — 87040 BLOOD CULTURE FOR BACTERIA: CPT

## 2023-10-06 PROCEDURE — 99285 EMERGENCY DEPT VISIT HI MDM: CPT | Performed by: EMERGENCY MEDICINE

## 2023-10-06 RX ORDER — HYDROMORPHONE HCL/PF 1 MG/ML
0.5 SYRINGE (ML) INJECTION ONCE
Status: COMPLETED | OUTPATIENT
Start: 2023-10-07 | End: 2023-10-07

## 2023-10-06 RX ORDER — ONDANSETRON 2 MG/ML
4 INJECTION INTRAMUSCULAR; INTRAVENOUS ONCE
Status: COMPLETED | OUTPATIENT
Start: 2023-10-07 | End: 2023-10-07

## 2023-10-06 RX ORDER — ONDANSETRON 2 MG/ML
4 INJECTION INTRAMUSCULAR; INTRAVENOUS ONCE
Status: COMPLETED | OUTPATIENT
Start: 2023-10-06 | End: 2023-10-06

## 2023-10-06 RX ORDER — HYDROMORPHONE HCL/PF 1 MG/ML
0.5 SYRINGE (ML) INJECTION ONCE
Status: COMPLETED | OUTPATIENT
Start: 2023-10-06 | End: 2023-10-06

## 2023-10-06 RX ORDER — CEFEPIME HYDROCHLORIDE 2 G/50ML
2000 INJECTION, SOLUTION INTRAVENOUS ONCE
Status: COMPLETED | OUTPATIENT
Start: 2023-10-06 | End: 2023-10-06

## 2023-10-06 RX ADMIN — SODIUM CHLORIDE 1000 ML: 0.9 INJECTION, SOLUTION INTRAVENOUS at 21:46

## 2023-10-06 RX ADMIN — ONDANSETRON 4 MG: 2 INJECTION INTRAMUSCULAR; INTRAVENOUS at 21:44

## 2023-10-06 RX ADMIN — HYDROMORPHONE HYDROCHLORIDE 0.5 MG: 1 INJECTION, SOLUTION INTRAMUSCULAR; INTRAVENOUS; SUBCUTANEOUS at 22:27

## 2023-10-06 RX ADMIN — CEFEPIME HYDROCHLORIDE 2000 MG: 2 INJECTION, SOLUTION INTRAVENOUS at 22:28

## 2023-10-06 RX ADMIN — VANCOMYCIN HYDROCHLORIDE 1500 MG: 10 INJECTION, POWDER, LYOPHILIZED, FOR SOLUTION INTRAVENOUS at 23:03

## 2023-10-06 NOTE — LETTER
[address] [place] [address] [address] [address]  Dept: [phone]    October 10, 2023     Patient: Rodrigue Mendoza   YOB: 1980   Date of Visit: 10/6/2023       To Whom it May Concern:    Royal Melgar is under my professional care. He was seen in the hospital from 10/6/2023 to 10/10/23. He may return to work on 10/12/2023. If you have any questions or concerns, please don't hesitate to call.     Sincerely,          Sana Jonas MD

## 2023-10-07 ENCOUNTER — APPOINTMENT (INPATIENT)
Dept: RADIOLOGY | Facility: HOSPITAL | Age: 43
DRG: 871 | End: 2023-10-07
Payer: COMMERCIAL

## 2023-10-07 PROBLEM — J96.01 ACUTE HYPOXIC RESPIRATORY FAILURE (HCC): Status: ACTIVE | Noted: 2023-10-07

## 2023-10-07 PROBLEM — L03.90 CELLULITIS: Status: ACTIVE | Noted: 2023-10-07

## 2023-10-07 LAB
2HR DELTA HS TROPONIN: -1 NG/L
4HR DELTA HS TROPONIN: -3 NG/L
ALBUMIN SERPL BCP-MCNC: 3 G/DL (ref 3.5–5)
ALP SERPL-CCNC: 69 U/L (ref 34–104)
ALT SERPL W P-5'-P-CCNC: 10 U/L (ref 7–52)
ANION GAP SERPL CALCULATED.3IONS-SCNC: 10 MMOL/L
AST SERPL W P-5'-P-CCNC: 13 U/L (ref 13–39)
BACTERIA UR QL AUTO: ABNORMAL /HPF
BILIRUB SERPL-MCNC: 0.48 MG/DL (ref 0.2–1)
BILIRUB UR QL STRIP: ABNORMAL
BUN SERPL-MCNC: 23 MG/DL (ref 5–25)
CALCIUM ALBUM COR SERPL-MCNC: 8.6 MG/DL (ref 8.3–10.1)
CALCIUM SERPL-MCNC: 7.8 MG/DL (ref 8.4–10.2)
CARDIAC TROPONIN I PNL SERPL HS: 10 NG/L
CARDIAC TROPONIN I PNL SERPL HS: 12 NG/L
CHLORIDE SERPL-SCNC: 94 MMOL/L (ref 96–108)
CLARITY UR: CLEAR
CO2 SERPL-SCNC: 28 MMOL/L (ref 21–32)
COLOR UR: ABNORMAL
CREAT SERPL-MCNC: 1.22 MG/DL (ref 0.6–1.3)
ERYTHROCYTE [DISTWIDTH] IN BLOOD BY AUTOMATED COUNT: 13.9 % (ref 11.6–15.1)
EST. AVERAGE GLUCOSE BLD GHB EST-MCNC: 295 MG/DL
GFR SERPL CREATININE-BSD FRML MDRD: 72 ML/MIN/1.73SQ M
GLUCOSE SERPL-MCNC: 145 MG/DL (ref 65–140)
GLUCOSE SERPL-MCNC: 149 MG/DL (ref 65–140)
GLUCOSE SERPL-MCNC: 155 MG/DL (ref 65–140)
GLUCOSE SERPL-MCNC: 181 MG/DL (ref 65–140)
GLUCOSE SERPL-MCNC: 200 MG/DL (ref 65–140)
GLUCOSE UR STRIP-MCNC: NEGATIVE MG/DL
HBA1C MFR BLD: 11.9 %
HCT VFR BLD AUTO: 37.9 % (ref 36.5–49.3)
HGB BLD-MCNC: 12.4 G/DL (ref 12–17)
HGB UR QL STRIP.AUTO: ABNORMAL
KETONES UR STRIP-MCNC: ABNORMAL MG/DL
LEUKOCYTE ESTERASE UR QL STRIP: NEGATIVE
MCH RBC QN AUTO: 28.9 PG (ref 26.8–34.3)
MCHC RBC AUTO-ENTMCNC: 32.7 G/DL (ref 31.4–37.4)
MCV RBC AUTO: 88 FL (ref 82–98)
NITRITE UR QL STRIP: NEGATIVE
NON-SQ EPI CELLS URNS QL MICRO: ABNORMAL /HPF
PH UR STRIP.AUTO: 5 [PH]
PLATELET # BLD AUTO: 250 THOUSANDS/UL (ref 149–390)
PMV BLD AUTO: 9.4 FL (ref 8.9–12.7)
POTASSIUM SERPL-SCNC: 3.6 MMOL/L (ref 3.5–5.3)
PROCALCITONIN SERPL-MCNC: 5.83 NG/ML
PROT SERPL-MCNC: 6.8 G/DL (ref 6.4–8.4)
PROT UR STRIP-MCNC: ABNORMAL MG/DL
RBC # BLD AUTO: 4.29 MILLION/UL (ref 3.88–5.62)
RBC #/AREA URNS AUTO: ABNORMAL /HPF
RBC CASTS URNS QL MICRO: ABNORMAL /LPF
SODIUM SERPL-SCNC: 132 MMOL/L (ref 135–147)
SP GR UR STRIP.AUTO: 1.02 (ref 1–1.03)
UROBILINOGEN UR QL STRIP.AUTO: 1 E.U./DL
WBC # BLD AUTO: 11.83 THOUSAND/UL (ref 4.31–10.16)
WBC #/AREA URNS AUTO: ABNORMAL /HPF

## 2023-10-07 PROCEDURE — 84484 ASSAY OF TROPONIN QUANT: CPT | Performed by: STUDENT IN AN ORGANIZED HEALTH CARE EDUCATION/TRAINING PROGRAM

## 2023-10-07 PROCEDURE — 85027 COMPLETE CBC AUTOMATED: CPT | Performed by: STUDENT IN AN ORGANIZED HEALTH CARE EDUCATION/TRAINING PROGRAM

## 2023-10-07 PROCEDURE — 84145 PROCALCITONIN (PCT): CPT | Performed by: STUDENT IN AN ORGANIZED HEALTH CARE EDUCATION/TRAINING PROGRAM

## 2023-10-07 PROCEDURE — 83036 HEMOGLOBIN GLYCOSYLATED A1C: CPT | Performed by: STUDENT IN AN ORGANIZED HEALTH CARE EDUCATION/TRAINING PROGRAM

## 2023-10-07 PROCEDURE — 87081 CULTURE SCREEN ONLY: CPT

## 2023-10-07 PROCEDURE — 81001 URINALYSIS AUTO W/SCOPE: CPT | Performed by: STUDENT IN AN ORGANIZED HEALTH CARE EDUCATION/TRAINING PROGRAM

## 2023-10-07 PROCEDURE — 93005 ELECTROCARDIOGRAM TRACING: CPT

## 2023-10-07 PROCEDURE — 82948 REAGENT STRIP/BLOOD GLUCOSE: CPT

## 2023-10-07 PROCEDURE — 99223 1ST HOSP IP/OBS HIGH 75: CPT | Performed by: INTERNAL MEDICINE

## 2023-10-07 PROCEDURE — 71045 X-RAY EXAM CHEST 1 VIEW: CPT

## 2023-10-07 PROCEDURE — 80053 COMPREHEN METABOLIC PANEL: CPT | Performed by: STUDENT IN AN ORGANIZED HEALTH CARE EDUCATION/TRAINING PROGRAM

## 2023-10-07 RX ORDER — HYDROMORPHONE HCL/PF 1 MG/ML
1 SYRINGE (ML) INJECTION EVERY 4 HOURS PRN
Status: DISCONTINUED | OUTPATIENT
Start: 2023-10-07 | End: 2023-10-10 | Stop reason: HOSPADM

## 2023-10-07 RX ORDER — CHLORAL HYDRATE 500 MG
1000 CAPSULE ORAL 2 TIMES DAILY
Status: DISCONTINUED | OUTPATIENT
Start: 2023-10-07 | End: 2023-10-10 | Stop reason: HOSPADM

## 2023-10-07 RX ORDER — VANCOMYCIN HYDROCHLORIDE 1 G/200ML
15 INJECTION, SOLUTION INTRAVENOUS EVERY 12 HOURS
Status: DISCONTINUED | OUTPATIENT
Start: 2023-10-07 | End: 2023-10-08 | Stop reason: DRUGHIGH

## 2023-10-07 RX ORDER — AMLODIPINE BESYLATE 5 MG/1
10 TABLET ORAL DAILY
Status: DISCONTINUED | OUTPATIENT
Start: 2023-10-08 | End: 2023-10-08

## 2023-10-07 RX ORDER — INSULIN LISPRO 100 [IU]/ML
2-12 INJECTION, SOLUTION INTRAVENOUS; SUBCUTANEOUS
Status: DISCONTINUED | OUTPATIENT
Start: 2023-10-07 | End: 2023-10-10 | Stop reason: HOSPADM

## 2023-10-07 RX ORDER — ONDANSETRON 2 MG/ML
4 INJECTION INTRAMUSCULAR; INTRAVENOUS EVERY 6 HOURS PRN
Status: DISCONTINUED | OUTPATIENT
Start: 2023-10-07 | End: 2023-10-10 | Stop reason: HOSPADM

## 2023-10-07 RX ORDER — LISINOPRIL 20 MG/1
20 TABLET ORAL DAILY
Status: DISCONTINUED | OUTPATIENT
Start: 2023-10-07 | End: 2023-10-07

## 2023-10-07 RX ORDER — CEFEPIME HYDROCHLORIDE 2 G/50ML
2000 INJECTION, SOLUTION INTRAVENOUS EVERY 12 HOURS
Status: DISCONTINUED | OUTPATIENT
Start: 2023-10-07 | End: 2023-10-07 | Stop reason: DRUGHIGH

## 2023-10-07 RX ORDER — INSULIN GLARGINE 100 [IU]/ML
12 INJECTION, SOLUTION SUBCUTANEOUS
Status: DISCONTINUED | OUTPATIENT
Start: 2023-10-07 | End: 2023-10-10 | Stop reason: HOSPADM

## 2023-10-07 RX ORDER — CEFAZOLIN SODIUM 1 G/50ML
1000 SOLUTION INTRAVENOUS EVERY 12 HOURS
Status: DISCONTINUED | OUTPATIENT
Start: 2023-10-07 | End: 2023-10-07

## 2023-10-07 RX ORDER — ASPIRIN 81 MG/1
81 TABLET, CHEWABLE ORAL DAILY
Status: DISCONTINUED | OUTPATIENT
Start: 2023-10-07 | End: 2023-10-10 | Stop reason: HOSPADM

## 2023-10-07 RX ORDER — ENOXAPARIN SODIUM 100 MG/ML
60 INJECTION SUBCUTANEOUS 2 TIMES DAILY
Status: DISCONTINUED | OUTPATIENT
Start: 2023-10-07 | End: 2023-10-10 | Stop reason: HOSPADM

## 2023-10-07 RX ORDER — CARVEDILOL 3.12 MG/1
6.25 TABLET ORAL 2 TIMES DAILY WITH MEALS
Status: DISCONTINUED | OUTPATIENT
Start: 2023-10-07 | End: 2023-10-10 | Stop reason: HOSPADM

## 2023-10-07 RX ORDER — ATORVASTATIN CALCIUM 40 MG/1
80 TABLET, FILM COATED ORAL DAILY
Status: DISCONTINUED | OUTPATIENT
Start: 2023-10-07 | End: 2023-10-10 | Stop reason: HOSPADM

## 2023-10-07 RX ORDER — IBUPROFEN 400 MG/1
400 TABLET ORAL EVERY 6 HOURS PRN
Status: DISCONTINUED | OUTPATIENT
Start: 2023-10-07 | End: 2023-10-08

## 2023-10-07 RX ORDER — ACETAMINOPHEN 325 MG/1
650 TABLET ORAL EVERY 8 HOURS SCHEDULED
Status: DISCONTINUED | OUTPATIENT
Start: 2023-10-07 | End: 2023-10-10 | Stop reason: HOSPADM

## 2023-10-07 RX ORDER — AMLODIPINE BESYLATE 5 MG/1
10 TABLET ORAL DAILY
Status: DISCONTINUED | OUTPATIENT
Start: 2023-10-07 | End: 2023-10-07

## 2023-10-07 RX ORDER — CEFAZOLIN SODIUM 2 G/50ML
2000 SOLUTION INTRAVENOUS EVERY 8 HOURS
Status: DISCONTINUED | OUTPATIENT
Start: 2023-10-07 | End: 2023-10-10 | Stop reason: HOSPADM

## 2023-10-07 RX ORDER — LISINOPRIL 20 MG/1
20 TABLET ORAL DAILY
Status: DISCONTINUED | OUTPATIENT
Start: 2023-10-08 | End: 2023-10-08

## 2023-10-07 RX ORDER — OXYCODONE HYDROCHLORIDE 5 MG/1
5 TABLET ORAL EVERY 4 HOURS PRN
Status: DISCONTINUED | OUTPATIENT
Start: 2023-10-07 | End: 2023-10-10 | Stop reason: HOSPADM

## 2023-10-07 RX ORDER — CEFEPIME HYDROCHLORIDE 2 G/50ML
2000 INJECTION, SOLUTION INTRAVENOUS EVERY 8 HOURS
Status: DISCONTINUED | OUTPATIENT
Start: 2023-10-07 | End: 2023-10-07

## 2023-10-07 RX ADMIN — ONDANSETRON 4 MG: 2 INJECTION INTRAMUSCULAR; INTRAVENOUS at 00:24

## 2023-10-07 RX ADMIN — HYDROMORPHONE HYDROCHLORIDE 1 MG: 1 INJECTION, SOLUTION INTRAMUSCULAR; INTRAVENOUS; SUBCUTANEOUS at 21:50

## 2023-10-07 RX ADMIN — AMLODIPINE BESYLATE 10 MG: 5 TABLET ORAL at 08:02

## 2023-10-07 RX ADMIN — HYDROMORPHONE HYDROCHLORIDE 1 MG: 1 INJECTION, SOLUTION INTRAMUSCULAR; INTRAVENOUS; SUBCUTANEOUS at 12:20

## 2023-10-07 RX ADMIN — OXYCODONE HYDROCHLORIDE 5 MG: 5 TABLET ORAL at 11:27

## 2023-10-07 RX ADMIN — ASPIRIN 81 MG CHEWABLE TABLET 81 MG: 81 TABLET CHEWABLE at 08:02

## 2023-10-07 RX ADMIN — ACETAMINOPHEN 650 MG: 325 TABLET ORAL at 14:21

## 2023-10-07 RX ADMIN — HYDROMORPHONE HYDROCHLORIDE 0.5 MG: 1 INJECTION, SOLUTION INTRAMUSCULAR; INTRAVENOUS; SUBCUTANEOUS at 00:20

## 2023-10-07 RX ADMIN — ATORVASTATIN CALCIUM 80 MG: 40 TABLET, FILM COATED ORAL at 08:03

## 2023-10-07 RX ADMIN — OXYCODONE HYDROCHLORIDE 5 MG: 5 TABLET ORAL at 19:50

## 2023-10-07 RX ADMIN — OXYCODONE HYDROCHLORIDE 5 MG: 5 TABLET ORAL at 15:58

## 2023-10-07 RX ADMIN — ACETAMINOPHEN 650 MG: 325 TABLET ORAL at 21:30

## 2023-10-07 RX ADMIN — INSULIN LISPRO 2 UNITS: 100 INJECTION, SOLUTION INTRAVENOUS; SUBCUTANEOUS at 11:27

## 2023-10-07 RX ADMIN — ACETAMINOPHEN 650 MG: 325 TABLET ORAL at 05:00

## 2023-10-07 RX ADMIN — OXYCODONE HYDROCHLORIDE 5 MG: 5 TABLET ORAL at 02:01

## 2023-10-07 RX ADMIN — HYDROMORPHONE HYDROCHLORIDE 1 MG: 1 INJECTION, SOLUTION INTRAMUSCULAR; INTRAVENOUS; SUBCUTANEOUS at 03:25

## 2023-10-07 RX ADMIN — ENOXAPARIN SODIUM 60 MG: 60 INJECTION SUBCUTANEOUS at 21:30

## 2023-10-07 RX ADMIN — LISINOPRIL 20 MG: 20 TABLET ORAL at 08:04

## 2023-10-07 RX ADMIN — CEFAZOLIN SODIUM 2000 MG: 2 SOLUTION INTRAVENOUS at 10:39

## 2023-10-07 RX ADMIN — CEFEPIME 2000 MG: 2 INJECTION, POWDER, FOR SOLUTION INTRAVENOUS at 06:44

## 2023-10-07 RX ADMIN — CARVEDILOL 6.25 MG: 3.12 TABLET, FILM COATED ORAL at 08:03

## 2023-10-07 RX ADMIN — HYDROMORPHONE HYDROCHLORIDE 1 MG: 1 INJECTION, SOLUTION INTRAMUSCULAR; INTRAVENOUS; SUBCUTANEOUS at 08:00

## 2023-10-07 RX ADMIN — HYDROMORPHONE HYDROCHLORIDE 1 MG: 1 INJECTION, SOLUTION INTRAMUSCULAR; INTRAVENOUS; SUBCUTANEOUS at 17:19

## 2023-10-07 RX ADMIN — INSULIN GLARGINE 12 UNITS: 100 INJECTION, SOLUTION SUBCUTANEOUS at 23:02

## 2023-10-07 RX ADMIN — INSULIN LISPRO 2 UNITS: 100 INJECTION, SOLUTION INTRAVENOUS; SUBCUTANEOUS at 08:05

## 2023-10-07 RX ADMIN — CEFAZOLIN SODIUM 2000 MG: 2 SOLUTION INTRAVENOUS at 17:19

## 2023-10-07 RX ADMIN — OXYCODONE HYDROCHLORIDE 5 MG: 5 TABLET ORAL at 06:52

## 2023-10-07 RX ADMIN — VANCOMYCIN HYDROCHLORIDE 1000 MG: 1 INJECTION, SOLUTION INTRAVENOUS at 23:02

## 2023-10-07 RX ADMIN — INSULIN LISPRO 4 UNITS: 100 INJECTION, SOLUTION INTRAVENOUS; SUBCUTANEOUS at 16:12

## 2023-10-07 RX ADMIN — ENOXAPARIN SODIUM 60 MG: 60 INJECTION SUBCUTANEOUS at 08:04

## 2023-10-07 RX ADMIN — OMEGA-3 FATTY ACIDS CAP 1000 MG 1000 MG: 1000 CAP at 08:04

## 2023-10-07 RX ADMIN — VANCOMYCIN HYDROCHLORIDE 1000 MG: 1 INJECTION, SOLUTION INTRAVENOUS at 10:53

## 2023-10-07 RX ADMIN — OMEGA-3 FATTY ACIDS CAP 1000 MG 1000 MG: 1000 CAP at 17:10

## 2023-10-07 NOTE — H&P
H&P Exam - Joann Capps 44 y.o. male MRN: [id number]    Unit/Bed#: [address] [place] [address] A Encounter: [id number]    Assessment/Plan:  * Sepsis [facility])  Assessment & Plan  Acute  As evidenced by WBC with bands tachypnea 25 and cellulitis on LLE  Pt reports pain x2 weeks and n/v x 2 days with 1 day/evening of dizziness/confusion  Exam shows as in photographs below a erythematous warm area over much of LLE  Leg XR shows no free are concerning for nec fac and inflamed/tender area does not involve groin (low concern for leonela gangrene)  · Given Zofran, 1 L normal saline, 2 doses of 0.5 Dilaudid, as well as vancomycin and cefepime in ED  · Given history of repeated infections, immunocompromise with Humira as well as diabetes concern for MRSA is not in significant; continue ABX as started with plan to de-escalate pending cultures, if significant improvement may also be able to discharge on oral doxycycline.   · BCx x2 pending, UA w/ reflex Cx  · CXR pending  · Pro-Markie in the morning      Acute hypoxic respiratory failure (HCC)  Assessment & Plan  Tachypneic up to 25 with pulse ox of 94 while on 2 L nasal cannula  -Chest x-ray pending  - Suspect transient discomfort from nausea and pain  - Attempt to wean back to room air  - Supplemental O2 as needed     Cellulitis  Assessment & Plan  See plan under sepsis above                  Long term (current) use of immunomodulator  Assessment & Plan  Patient takes Humira outpatient for Hidradenitis suppurativa  -Follows with wound care outpatient    History of hidradenitis suppurativa  Assessment & Plan  Patient reports most symptoms on the lower extremities denies problems in the armpits or other frequent body spots such as scalp neck etc.   follows with wound care     Morbid obesity with body mass index (BMI) of 50.0 to 59.9 in adult [facility])  Assessment & Plan  Patient admits to excessive soda consumption  - Consult nutrition    Type 2 diabetes mellitus with diabetic polyneuropathy, without long-term current use of insulin ([address])  Assessment & Plan  Lab Results   Component Value Date    HGBA1C 7.5 (H) 03/30/2022       Recent Labs     10/06/23  2104   POCGLU 155*       Blood Sugar Average: Last 72 hrs:  (P) 155       Patient reports that at home he usually sees sugars around 150s but recently his diet has been poor (attributes to soda)  - Patient says that last time he was here he did not need insulin, agrees to sliding scale to begin with as well as carb controlled diet    Essential hypertension  Assessment & Plan  Continue patient's home Norvasc and Coreg as well as Zestril    Dyslipidemia  Assessment & Plan  Continue patient's home Lipitor and fish oil    Coronary artery disease  Assessment & Plan  Patient has history of catheterization in 2010 and 2020 including stent placement with a lateral catheterization.  - No acute chest pain or discomfort at this time        History of Present Illness   This is a 71-year-old gentleman with past medical history significant for CAD x2 stent, MI, diabetes, hidradenitis suppurativa, lymphedema. He reports that he has been experiencing leg pain for approximately 2 weeks with increasing severity. Patient says he more or less felt fine Wednesday when he went to bed but then woke up with nausea vomiting and worsening leg pain that night. He reports he was vomiting repeatedly throughout the night and then slept through most of the day before again becoming nauseous and vomiting during most of Friday as well. Patient was eventually urged to go to the emergency room. He drove himself here but then reports that he was feeling lightheaded/confused when he arrived. Patient attributes the symptoms back to recurrent episodes of cellulitis and abscess growth of the lower extremities and skin dating back initially to first episode approximately 2 and half years ago when he had a surgery on the upper leg and developed the swelling afterwards.   He quit smoking around this time and has been abstinent from alcohol for approximately 4 years. He reports his biggest vice is soda. He works in a supervisory role with a welding job. Recreationally he fishes on a boat (no concern for infected water contamination of the lower extremity per patient). Review of Systems   Constitutional: Negative for chills and fever. HENT: Negative for congestion, ear pain, rhinorrhea, sore throat and trouble swallowing. Eyes: Negative for pain and visual disturbance. Respiratory: Negative for cough and shortness of breath. Cardiovascular: Negative for chest pain and palpitations. Gastrointestinal: Positive for nausea. Negative for abdominal pain, constipation, diarrhea and vomiting. Genitourinary: Negative for dysuria and hematuria. Musculoskeletal: Negative for arthralgias and back pain. Skin: Positive for rash. Negative for color change. Neurological: Negative for seizures and syncope. All other systems reviewed and are negative.       Historical Information   Past Medical History:   Diagnosis Date   • Arthritis    • Breathing difficulty    • Coronary artery disease    • Diabetes mellitus ([address] [place] [address])    • Diabetic neuropathy ([address] [place] [address]) 5/28/2021   • Heart disease     CAD   s/p ptca with 1 stent 2012   • Hidradenitis suppurativa     groin   • History of transfusion    • Hyperlipidemia    • Hypertension    • MI (myocardial infarction) ([address] [place] [address])     " silent " M.I. in the past   • Morbid obesity with BMI of 50.0-59.9, adult ([address] [place] [address])    • Nicotine dependence    • Obesity    • Pilonidal cyst    • Type 1 non-ST elevation myocardial infarction (NSTEMI) ([address] [place] [address]) 11/29/2020     Past Surgical History:   Procedure Laterality Date   • CARDIAC SURGERY     • CORONARY ANGIOPLASTY WITH STENT PLACEMENT     • INCISION AND DRAINAGE OF WOUND N/A 1/24/2017    Procedure: INCISION AND DRAINAGE (I&D) BUTTOCK, PILONIDAL CYST;  Surgeon: Gloria Mcneal MD;  Location[facility];  Service:    • LEG SURGERY Left     I&D of left leg 2012   • AZ DEBRIDEMENT SUBCUTANEOUS TISSUE 20 SQ CM/< Left 2021    Procedure: DEBRIDEMENT WOUND [facility] OUT); Surgeon: Concepción Jung MD;  [facility];  Service: General   • AZ EXC B9 LESION MRGN XCP SK TG T/A/L >4.0 CM Left 2021    Procedure: EXCISION THIGH MASS;  Surgeon: Concepción Jung MD;  [facility];  Service: General   • AZ EXCISION H/P/P/U COMPLEX REPAIR Left 2021    Procedure: EXCISION PERINEAL ABSCESS LEFT THIGH;  Surgeon: Concepción Jung MD;  [facility];  Service: General   • AZ NEGATIVE PRESSURE WOUND THERAPY DME </= 50 SQ CM Left 2021    Procedure: APPLICATION VAC DRESSING THIGH;  Surgeon: Concepción Jung MD;  [facility];  Service: General   • WOUND DEBRIDEMENT Left 2021    Procedure: DEBRIDEMENT WOUND AND DRESSING CHANGE ([address] [place] [address] OUT); Surgeon: Claudia Rosa DO;  [facility];  Service: General   • WOUND DEBRIDEMENT Left 2021    Procedure: DEBRIDEMENT LOWER EXTREMITY [facility] OUT), left groin and thigh;  Surgeon: Concepción Jung MD;  [facility];  Service: General   • WOUND DEBRIDEMENT Left 2021    Procedure: DEBRIDEMENT LOWER EXTREMITY [facility] OUT); Surgeon:  Shaina Jennings DO;  [facility];  Service: General   • WOUND DEBRIDEMENT Left 2021    Procedure: Washout left thigh wound and closure;  Surgeon: Edwin Au DO;  [facility];  Service: General     Social History   Social History     Substance and Sexual Activity   Alcohol Use Not Currently    Comment: rarely     Social History     Substance and Sexual Activity   Drug Use No     Social History     Tobacco Use   Smoking Status Former   • Packs/day: 1.50   • Years: 20.00   • Total pack years: 30.00   • Types: Cigarettes   • Start date: 2021   • Quit date: 2021   • Years since quittin.6   Smokeless Tobacco Never     E-Cigarette Use: Never User     E-Cigarette/Vaping Substances   • Nicotine No    • THC No    • CBD No    • Flavoring No Family History:   Family History   Problem Relation Age of Onset   • Heart disease Father    • Diabetes Father    • Hypertension Mother    • Heart disease Mother        Meds/Allergies   all medications and allergies reviewed  Allergies   Allergen Reactions   • Amoxicillin Hives     childhood       Objective   First Vitals:   Blood Pressure: 122/71 (10/06/23 2106)  Pulse: 91 (10/06/23 2106)  Temperature: 98.9 °F (37.2 °C) (10/06/23 2100)  Temp Source: Oral (10/06/23 2100)  Respirations: 20 (10/06/23 2106)  Height: 5' 7" (170.2 cm) (10/07/23 0124)  Weight - Scale: (!) 168 kg (371 lb) (10/06/23 2106)  SpO2: 98 % (10/06/23 2106)    Current Vitals:   Blood Pressure: (!) 105/49 (10/07/23 0124)  Pulse: 87 (10/07/23 0124)  Temperature: 98.2 °F (36.8 °C) (10/07/23 0124)  Temp Source: Oral (10/06/23 2100)  Respirations: 22 (10/07/23 0102)  Height: 5' 7" (170.2 cm) (10/07/23 0124)  Weight - Scale: (!) 169 kg (371 lb 12.8 oz) (10/06/23 2107)  SpO2: 92 % (10/07/23 0124)      Intake/Output Summary (Last 24 hours) at 10/7/2023 0142  Last data filed at 10/7/2023 0021  Gross per 24 hour   Intake 1050 ml   Output --   Net 1050 ml       Invasive Devices     Peripheral Intravenous Line  Duration           Peripheral IV 10/06/23 Left Antecubital <1 day                Physical Exam  Vitals and nursing note reviewed. Constitutional:       General: He is not in acute distress. Appearance: He is well-developed. HENT:      Head: Normocephalic and atraumatic. Nose: Nose normal.      Mouth/Throat:      Mouth: Mucous membranes are moist.   Eyes:      Conjunctiva/sclera: Conjunctivae normal.      Pupils: Pupils are equal, round, and reactive to light. Cardiovascular:      Rate and Rhythm: Normal rate and regular rhythm. Heart sounds: No murmur heard. Pulmonary:      Effort: Pulmonary effort is normal. No respiratory distress. Breath sounds: Normal breath sounds. Abdominal:      Palpations: Abdomen is soft. Tenderness: There is no abdominal tenderness. Musculoskeletal:         General: No swelling. Cervical back: Neck supple. Left lower leg: Edema present. Skin:     General: Skin is warm and dry. Capillary Refill: Capillary refill takes less than 2 seconds. Findings: Rash present. Neurological:      Mental Status: He is alert and oriented to person, place, and time. Mental status is at baseline.    Psychiatric:         Mood and Affect: Mood normal.         Behavior: Behavior normal.         Lab Results:   Results for orders placed or performed during the hospital encounter of 10/06/23   COVID/FLU/RSV    Specimen: Nose; Nares   Result Value Ref Range    SARS-CoV-2 Negative Negative    INFLUENZA A PCR Negative Negative    INFLUENZA B PCR Negative Negative    RSV PCR Negative Negative   CBC and differential   Result Value Ref Range    WBC 16.18 (H) 4.31 - 10.16 Thousand/uL    RBC 4.73 3.88 - 5.62 Million/uL    Hemoglobin 13.6 12.0 - 17.0 g/dL    Hematocrit 40.5 36.5 - 49.3 %    MCV 86 82 - 98 fL    MCH 28.8 26.8 - 34.3 pg    MCHC 33.6 31.4 - 37.4 g/dL    RDW 14.0 11.6 - 15.1 %    MPV 10.0 8.9 - 12.7 fL    Platelets 886 072 - 925 Thousands/uL   Comprehensive metabolic panel   Result Value Ref Range    Sodium 130 (L) 135 - 147 mmol/L    Potassium 4.9 3.5 - 5.3 mmol/L    Chloride 92 (L) 96 - 108 mmol/L    CO2 27 21 - 32 mmol/L    ANION GAP 11 mmol/L    BUN 20 5 - 25 mg/dL    Creatinine 1.21 0.60 - 1.30 mg/dL    Glucose 140 65 - 140 mg/dL    Calcium 8.3 (L) 8.4 - 10.2 mg/dL    Corrected Calcium 8.9 8.3 - 10.1 mg/dL    AST 36 13 - 39 U/L    ALT 11 7 - 52 U/L    Alkaline Phosphatase 79 34 - 104 U/L    Total Protein 7.7 6.4 - 8.4 g/dL    Albumin 3.3 (L) 3.5 - 5.0 g/dL    Total Bilirubin 0.65 0.20 - 1.00 mg/dL    eGFR 73 ml/min/1.73sq m   Lactic acid, plasma (w/reflex if result > 2.0)   Result Value Ref Range    LACTIC ACID 1.1 0.5 - 2.0 mmol/L   Procalcitonin   Result Value Ref Range    Procalcitonin 7.02 (H) <=0.25 ng/ml   HS Troponin 0hr (reflex protocol)   Result Value Ref Range    hs TnI 0hr 13 "Refer to ACS Flowchart"- see link ng/L   Fingerstick Glucose (POCT)   Result Value Ref Range    POC Glucose 155 (H) 65 - 140 mg/dl   Manual Differential(PHLEBS Do Not Order)   Result Value Ref Range    Segmented % 81 (H) 43 - 75 %    Bands % 13 (H) 0 - 8 %    Lymphocytes % 2 (L) 14 - 44 %    Monocytes % 4 4 - 12 %    Eosinophils, % 0 0 - 6 %    Basophils % 0 0 - 1 %    Absolute Neutrophils 15.21 (H) 1.85 - 7.62 Thousand/uL    Lymphocytes Absolute 0.32 (L) 0.60 - 4.47 Thousand/uL    Monocytes Absolute 0.65 0.00 - 1.22 Thousand/uL    Eosinophils Absolute 0.00 0.00 - 0.40 Thousand/uL    Basophils Absolute 0.00 0.00 - 0.10 Thousand/uL    Total Counted      RBC Morphology Present     Platelet Estimate Adequate Adequate    Anisocytosis Present     Polychromasia Present        Imaging:   XR tibia fibula 2 views LEFT    (Results Pending)   XR chest 1 view    (Results Pending)       Code Status: Level 1 - Full Code  Advance Directive and Living Will:      Power of :    POLST:      Counseling / Coordination of Care: Total floor / unit time spent today >30 minutes.

## 2023-10-07 NOTE — ASSESSMENT & PLAN NOTE
Patient has history of catheterization in 2010 and 2020 including stent placement with a lateral catheterization.  - No acute chest pain or discomfort at this time

## 2023-10-07 NOTE — UTILIZATION REVIEW
Initial Clinical Review    Admission: Date/Time/Statement:   Admission Orders (From admission, onward)     Ordered        10/06/23 3749  [address]  Once                      Orders Placed This Encounter   Procedures   • INPATIENT ADMISSION     Standing Status:   Standing     Number of Occurrences:   1     Order Specific Question:   Level of Care     Answer:   Med Surg [16]     Order Specific Question:   Estimated length of stay     Answer:   More than 2 Midnights     Order Specific Question:   Certification     Answer:   I certify that inpatient services are medically necessary for this patient for a duration of greater than two midnights. See H&P and MD Progress Notes for additional information about the patient's course of treatment. ED Arrival Information     Expected   -    Arrival   10/6/2023 20:55    Acuity   Urgent            Means of arrival   Walk-In    Escorted by   Self    Service   Hospitalist    Admission type   Emergency            Arrival complaint   vomiting / leg pain            Chief Complaint   Patient presents with   • Vomiting     Pt c/o vomiting, dizziness, diaphoresis, lymphedema L leg pain and wound inner thigh. Pt has HX diabetes       Initial Presentation: 44 y.o. male presents to ed from home for evaluation and treatment of vomiting x 2 days. He also reports severe LLE pain, malaise, lightheadedness, diaphoresis. Patient follows with wound care for hidradenitis suppurativa. He has started biweekly Humira injections as ordered by his dermatologist. Richie Gaming: DM, CAD, HTN, LYMPHEDEMA LLE  Clinical assessment significant with profound erythema, warmth, tenderness of LLE consistent with cellulitis. WBC 16.18, BANDS 13, PROCAL 7.02, . Initially treated with iv zofran x2,iv .9% ns bolus, iv dilaudid x2,iv vancomycin x1,iv cefepime x 1. Admit to inpatient med surg    Date: 10-7-23  Day 2: inpatient med surg   hypotensive today 105/49. Room air sat 88%. Pain LLE 10/10. Received iv dilaudid at 0020, 0325, 0800 and oxycodone at 0201, 052  Continue iv vancomycin and iv ancef. Plan chest x ray and LLE x ray. Follow up urine culture, blood cultures and random vancomycin.         ED Triage Vitals   10/06/23 2100 10/06/23 2106 10/06/23 2106 10/06/23 2106 10/06/23 2106   98.9 °F (37.2 °C) 91 20 122/71 98 %      Oral Monitor         Pain Score       8          10/06/23 (!) 169 kg (371 lb 12.8 oz)     Additional Vital Signs:      Date/  Time Temp Pulse Resp BP MAP  SpO2 Nasal Cannula O2 Flow Rate (L/min) O2 Device   10/07/23 07:54:27 98.4 °F (36.9 °C) 71 18 116/48   Abnormal  71 88 % Abnormal  -- None (Room air)   10/07/23 01:45:33 -- 82 -- 113/70 84 95 % -- None (Room air)   10/07/23 01:24:17 98.2 °F (36.8 °C) 87 -- 105/49   Abnormal  68 92 % -- --   10/07/23 0102 -- 82 22 122/78 -- 94 % 2 L/min Nasal cannula   10/06/23 2330 -- 83 25   Abnormal  124/61 86 95 % -- --   10/06/23 2300 -- 81 20 148/66 95 95 % -- --   10/06/23 2245 -- 78 21 123/64 88 94 % -- --   10/06/23 2230 -- 80 14 124/64 87 97 % 2 L/min Nasal cannula   10/06/23 2223 -- -- -- -- -- -- -- Nasal cannula   10/06/23 2215 -- 74 15 113/64 83 95 % 2 L/min Nasal cannula   10/06/23 2200 -- 77 12 99/56 73 93 % -- --   10/06/23 2145 -- 82 16 101/58 74 95 % -- --   10/06/23 2115 -- 85 22 122/71 91 93 % -- --   10/06/23 2107 -- 88 18 122/71 -- 95 % -- --   10/06/23 2106 -- 91 20 122/71 -- 98 % -- None (Room air)   10/06/23 2100 98.9 °F (37.2 °C) -- -- -- -- -- -- --       Pertinent Labs/Diagnostic Test Results:             Results from last 7 days   Lab Units 10/06/23  2140   SARS-COV-2  Negative     Results from last 7 days   Lab Units 10/07/23  0447 10/06/23  2141   WBC Thousand/uL 11.83* 16.18*   HEMOGLOBIN g/dL 12.4 13.6   HEMATOCRIT % 37.9 40.5   PLATELETS Thousands/uL 250 306   BANDS PCT %  --  13*         Results from last 7 days   Lab Units 10/07/23  0447 10/06/23  2141   SODIUM mmol/L 132* 130*   POTASSIUM mmol/L 3.6 4.9 CHLORIDE mmol/L 94* 92*   CO2 mmol/L 28 27   ANION GAP mmol/L 10 11   BUN mg/dL 23 20   CREATININE mg/dL 1.22 1.21   EGFR ml/min/1.73sq m 72 73   CALCIUM mg/dL 7.8* 8.3*     Results from last 7 days   Lab Units 10/07/23  0447 10/06/23  2141   AST U/L 13 36   ALT U/L 10 11   ALK PHOS U/L 69 79   TOTAL PROTEIN g/dL 6.8 7.7   ALBUMIN g/dL 3.0* 3.3*   TOTAL BILIRUBIN mg/dL 0.48 0.65     Results from last 7 days   Lab Units 10/07/23  0722 10/06/23  2104   POC GLUCOSE mg/dl 155* 155*     Results from last 7 days   Lab Units 10/07/23  0447 10/06/23  2141   GLUCOSE RANDOM mg/dL 149* 140       Results from last 7 days   Lab Units 10/07/23  0447 10/07/23  0156 10/06/23  2301   HS TNI 0HR ng/L  --   --  13   HS TNI 2HR ng/L  --  12  --    HSTNI D2 ng/L  --  -1  --    HS TNI 4HR ng/L 10  --   --    HSTNI D4 ng/L -3  --   --        Results from last 7 days   Lab Units 10/07/23  0447 10/06/23  2141   PROCALCITONIN ng/ml 5.83* 7.02*     Results from last 7 days   Lab Units 10/06/23  2141   LACTIC ACID mmol/L 1.1       Results from last 7 days   Lab Units 10/07/23  1013   CLARITY UA  Clear   COLOR UA  Anais   SPEC GRAV UA  1.025   PH UA  5.0   GLUCOSE UA mg/dl Negative   KETONES UA mg/dl Trace*   BLOOD UA  Large*   PROTEIN UA mg/dl 100 (2+)*   NITRITE UA  Negative   BILIRUBIN UA  Small*   UROBILINOGEN UA E.U./dl 1.0   LEUKOCYTES UA  Negative     Results from last 7 days   Lab Units 10/06/23  2140   INFLUENZA A PCR  Negative   INFLUENZA B PCR  Negative   RSV PCR  Negative       ED Treatment:   Medication Administration from 10/06/2023 2055 to 10/07/2023 0112       Date/Time Order Dose Route Action     10/06/2023 2144 EDT ondansetron (ZOFRAN) injection 4 mg 4 mg Intravenous Given     10/06/2023 2146 EDT sodium chloride 0.9 % bolus 1,000 mL 1,000 mL Intravenous New Bag     10/06/2023 2227 EDT HYDROmorphone (DILAUDID) injection 0.5 mg 0.5 mg Intravenous Given     10/06/2023 2303 EDT vancomycin (VANCOCIN) 1500 mg in sodium chloride 0.9% 250 mL IVPB 1,500 mg Intravenous New Bag     10/06/2023 2228 EDT cefepime (MAXIPIME) IVPB (premix in dextrose) 2,000 mg 50 mL 2,000 mg Intravenous New Bag     10/07/2023 0024 EDT ondansetron (ZOFRAN) injection 4 mg 4 mg Intravenous Given     10/07/2023 0020 EDT HYDROmorphone (DILAUDID) injection 0.5 mg 0.5 mg Intravenous Given        Past Medical History:   Diagnosis Date   • Arthritis    • Breathing difficulty    • Coronary artery disease    • Diabetes mellitus ([address] [place] [address])    • Diabetic neuropathy ([address] [place] [address]) 5/28/2021   • Heart disease     CAD   s/p ptca with 1 stent 2012   • Hidradenitis suppurativa     groin   • History of transfusion    • Hyperlipidemia    • Hypertension    • MI (myocardial infarction) ([address] [place] [address])     " silent " M.I. in the past   • Morbid obesity with BMI of 50.0-59.9, adult ([address] [place] [address])    • Nicotine dependence    • Obesity    • Pilonidal cyst    • Type 1 non-ST elevation myocardial infarction (NSTEMI) ([address] [place] [address]) 11/29/2020     Present on Admission:  • Coronary artery disease  • Dyslipidemia  • Essential hypertension  • Type 2 diabetes mellitus with diabetic polyneuropathy, without long-term current use of insulin ([facility])  • Lymphedema of both lower extremities  • Cellulitis  • History of hidradenitis suppurativa      Admitting Diagnosis: Cellulitis [L03.90]  Diabetes ([address] [place] [address]) [E11.9]  Nausea & vomiting [R11.2]  Coronary artery disease involving native coronary artery of native heart without angina pectoris [I25.10]  Type 2 diabetes mellitus with diabetic polyneuropathy, without long-term current use of insulin ([address] [place] [address]) [E11.42]      Age/Sex: 44 y.o. male    Scheduled Medications:    acetaminophen, 650 mg, Oral, Q8H [address]  [START ON 10/8/2023] amLODIPine, 10 mg, Oral, Daily  aspirin, 81 mg, Oral, Daily  atorvastatin, 80 mg, Oral, Daily  carvedilol, 6.25 mg, Oral, BID With Meals  cefazolin, 2,000 mg, Intravenous, Q8H  enoxaparin, 60 mg, Subcutaneous, BID  fish oil, 1,000 mg, Oral, BID  insulin lispro, 2-12 Units, Subcutaneous, TID AC  [START ON 10/8/2023] lisinopril, 20 mg, Oral, Daily  vancomycin, 15 mg/kg (Ideal), Intravenous, Q12H      Continuous IV Infusions:     PRN Meds:  HYDROmorphone, 1 mg, Intravenous, Q4H PRN  ibuprofen, 400 mg, Oral, Q6H PRN  ondansetron, 4 mg, Intravenous, Q6H PRN  oxyCODONE, 5 mg, Oral, Q4H PRN        IP CONSULT TO NUTRITION SERVICES  IP CONSULT TO PHARMACY    Network Utilization Review Department  ATTENTION: Please call with any questions or concerns to [phone] and carefully listen to the prompts so that you are directed to the right person. All voicemails are confidential.   For Discharge needs, contact Care Management DC Support Team at [phone] opt. 2  Send all requests for admission clinical reviews, approved or denied determinations and any other requests to dedicated fax number below belonging to the campus where the patient is receiving treatment.  List of dedicated fax numbers for the Facilities:  [place] DENIALS (Administrative/Medical Necessity) [phone]   DISCHARGE SUPPORT TEAM (NETWORK) [address] [facility] (Maternity/NICU/Pediatrics) [phone]   [address] [facility] [phone]   [address] [facility] [address] [address] [facility] [address] [facility]  [address] [phone]

## 2023-10-07 NOTE — PROGRESS NOTES
Wili Sen is a 44 y.o. male who is currently ordered Vancomycin IV with management by the Pharmacy Consult service. Relevant clinical data and objective / subjective history reviewed. Vancomycin Assessment:  Indication and Goal AUC/Trough: Soft tissue (goal -600, trough >10), -600, trough >10  Clinical Status: stable  Micro:   Pending  Renal Function:  SCr: 1.22 mg/dL  CrCl: 119.2 mL/min  Renal replacement: Not on dialysis  Days of Therapy: 2  Current Dose: 1500 mg IV x 1 dose  Vancomycin Plan:  New Dosin mg IV q12h  Estimated AUC: 448 mcg*hr/mL  Estimated Trough: 12.4 mcg/mL  Next Level: 10/8/23 at 0600  Renal Function Monitoring: Daily BMP and [place] will continue to follow closely for s/sx of nephrotoxicity, infusion reactions and appropriateness of therapy. BMP and CBC will be ordered per protocol. We will continue to follow the patient’s culture results and clinical progress daily.     Wilfredo Posada, Pharmacist

## 2023-10-07 NOTE — PLAN OF CARE
Problem: PAIN - ADULT  Goal: Verbalizes/displays adequate comfort level or baseline comfort level  Description: Interventions:  - Encourage patient to monitor pain and request assistance  - Assess pain using appropriate pain scale  - Administer analgesics based on type and severity of pain and evaluate response  - Implement non-pharmacological measures as appropriate and evaluate response  - Consider cultural and social influences on pain and pain management  - Notify physician/advanced practitioner if interventions unsuccessful or patient reports new pain  Outcome: Progressing     Problem: INFECTION - ADULT  Goal: Absence or prevention of progression during hospitalization  Description: INTERVENTIONS:  - Assess and monitor for signs and symptoms of infection  - Monitor lab/diagnostic results  - Monitor all insertion sites, i.e. indwelling lines, tubes, and drains  - Monitor endotracheal if appropriate and nasal secretions for changes in amount and color  - [place] appropriate cooling/warming therapies per order  - Administer medications as ordered  - Instruct and encourage patient and family to use good hand hygiene technique  - Identify and instruct in appropriate isolation precautions for identified infection/condition  Outcome: Progressing     Problem: SKIN/TISSUE INTEGRITY - ADULT  Goal: Skin Integrity remains intact(Skin Breakdown Prevention)  Description: Assess:  -Perform Juan assessment every 24  -Clean and moisturize skin every 24  -Inspect skin when repositioning, toileting, and assisting with ADLS      Bed Management:  -Have minimal linens on bed & keep smooth, unwrinkled  -Change linens as needed when moist or perspiring  -Avoid sitting or lying in one position for more than 2 hours while in bed  -Keep HOB at 45degrees     Toileting:  -Offer bedside commode      Activity:  -Mobilize patient 2 times a day  -Encourage activity and walks on unit  -Encourage or provide ROM exercises   -Turn and reposition patient every 2 Hours  -Use appropriate equipment to lift or move patient in bed  -Instruct/ Assist with weight shifting every 2 when out of bed in chair  -Consider limitation of chair time 2 hour intervals    Skin Care:  -Avoid use of baby powder, tape, friction and shearing, hot water or constrictive clothing  -Relieve pressure over bony prominences using cushion  -Do not massage red bony areas    Next Steps:  -Teach patient strategies to minimize risks such as reposition   -Consider consults to  interdisciplinary teams such as nutrition  Outcome: Progressing  Goal: Incision(s), wounds(s) or drain site(s) healing without S/S of infection  Description: INTERVENTIONS  - Assess and document dressing, incision, wound bed, drain sites and surrounding tissue  - Provide patient and family education  - Perform skin care/dressing changes every 24  Outcome: Progressing

## 2023-10-07 NOTE — PLAN OF CARE
Problem: PAIN - ADULT  Goal: Verbalizes/displays adequate comfort level or baseline comfort level  Description: Interventions:  - Encourage patient to monitor pain and request assistance  - Assess pain using appropriate pain scale  - Administer analgesics based on type and severity of pain and evaluate response  - Implement non-pharmacological measures as appropriate and evaluate response  - Consider cultural and social influences on pain and pain management  - Notify physician/advanced practitioner if interventions unsuccessful or patient reports new pain  Outcome: Progressing     Problem: INFECTION - ADULT  Goal: Absence or prevention of progression during hospitalization  Description: INTERVENTIONS:  - Assess and monitor for signs and symptoms of infection  - Monitor lab/diagnostic results  - Monitor all insertion sites, i.e. indwelling lines, tubes, and drains  - Monitor endotracheal if appropriate and nasal secretions for changes in amount and color  - [place] appropriate cooling/warming therapies per order  - Administer medications as ordered  - Instruct and encourage patient and family to use good hand hygiene technique  - Identify and instruct in appropriate isolation precautions for identified infection/condition  Outcome: Progressing     Problem: SKIN/TISSUE INTEGRITY - ADULT  Goal: Skin Integrity remains intact(Skin Breakdown Prevention)  Description: Assess:  -Perform Juan assessment every shift  -Clean and moisturize skin every shift  -Inspect skin when repositioning, toileting, and assisting with ADLS  -Assess extremities for adequate circulation and sensation     Bed Management:  -Have minimal linens on bed & keep smooth, unwrinkled  -Change linens as needed when moist or perspiring  -Avoid sitting or lying in one position for more than 2 hours while in bed  -Keep HOB at 30 degrees     Activity:  -Mobilize patient 2 times a day  -Encourage activity and walks on unit  -Encourage or provide ROM exercises   -Turn and reposition patient every 2 Hours  -Use appropriate equipment to lift or move patient in bed  -Instruct/ Assist with weight shifting every 2 hours when out of bed in chair  -Consider limitation of chair time 2 hour intervals    Skin Care:  -Avoid use of baby powder, tape, friction and shearing, hot water or constrictive clothing  -Relieve pressure over bony prominences using waffle cushion  -Do not massage red bony areas    Next Steps:  -Consider consults to  interdisciplinary teams such as wound care nurse  Outcome: Progressing  Goal: Incision(s), wounds(s) or drain site(s) healing without S/S of infection  Description: INTERVENTIONS  - Assess and document dressing, incision, wound bed, drain sites and surrounding tissue  - Provide patient and family education  - Perform skin care/dressing changes every daily and PRN  Outcome: Progressing

## 2023-10-07 NOTE — ASSESSMENT & PLAN NOTE
Tachypneic up to 25 with pulse ox of 94 while on 2 L nasal cannula  -Chest x-ray pending  - Suspect transient discomfort from nausea and pain  - Attempt to wean back to room air  - Supplemental O2 as needed   -Start incentive spirometry

## 2023-10-07 NOTE — ASSESSMENT & PLAN NOTE
Patient takes [facility] outpatient for Hidradenitis suppurativa  -Follows with wound care outpatient

## 2023-10-07 NOTE — PLAN OF CARE
Problem: PAIN - ADULT  Goal: Verbalizes/displays adequate comfort level or baseline comfort level  Description: Interventions:  - Encourage patient to monitor pain and request assistance  - Assess pain using appropriate pain scale  - Administer analgesics based on type and severity of pain and evaluate response  - Implement non-pharmacological measures as appropriate and evaluate response  - Consider cultural and social influences on pain and pain management  - Notify physician/advanced practitioner if interventions unsuccessful or patient reports new pain  Outcome: Progressing     Problem: INFECTION - ADULT  Goal: Absence or prevention of progression during hospitalization  Description: INTERVENTIONS:  - Assess and monitor for signs and symptoms of infection  - Monitor lab/diagnostic results  - Monitor all insertion sites, i.e. indwelling lines, tubes, and drains  - Monitor endotracheal if appropriate and nasal secretions for changes in amount and color  - [place] appropriate cooling/warming therapies per order  - Administer medications as ordered  - Instruct and encourage patient and family to use good hand hygiene technique  - Identify and instruct in appropriate isolation precautions for identified infection/condition  Outcome: Progressing     Problem: SKIN/TISSUE INTEGRITY - ADULT  Goal: Skin Integrity remains intact(Skin Breakdown Prevention)  Description: Assess:  -Perform Juan assessment every shift   -Inspect skin when repositioning, toileting, and assisting with ADLS  -Assess extremities for adequate circulation and sensation     Bed Management:  -Have minimal linens on bed & keep smooth, unwrinkled  -Change linens as needed when moist or perspiring  -Avoid sitting or lying in one position for more than 2 hours while in bed    Toileting:  -Offer bedside commode    -Use incontinent care products after each incontinent episode     Activity:  -Mobilize patient 4 times a day  -Encourage activity and walks on unit  -Encourage or provide ROM exercises   -Turn and reposition patient every 2 Hours  -Use appropriate equipment to lift or move patient in bed  -Instruct/ Assist with weight shifting every 2 hours when out of bed in chair  -Consider limitation of chair time 2 hour intervals    Skin Care:  -Avoid use of baby powder, tape, friction and shearing, hot water or constrictive clothing  -Relieve pressure over bony prominences using pillows  -Do not massage red bony areas    Next Steps:  -    Outcome: Progressing  Goal: Incision(s), wounds(s) or drain site(s) healing without S/S of infection  Description: INTERVENTIONS  - Assess and document dressing, incision, wound bed, drain sites and surrounding tissue  - Provide patient and family education  - Perform skin care/dressing changes every shift  Outcome: Progressing

## 2023-10-07 NOTE — ASSESSMENT & PLAN NOTE
Acute  As evidenced by WBC with bands tachypnea 25 and cellulitis on LLE  Pt reports pain x2 weeks and n/v x 2 days with 1 day/evening of dizziness/confusion  Exam shows as in photographs below a erythematous warm area over much of LLE  Leg XR shows no free are concerning for nec fac and inflamed/tender area does not involve groin (low concern for leonela gangrene)    X-ray Fibula: No acute osseous abnormality.   DVT study: No acute or chronic DVTs    · Given Zofran, 1 L normal saline, 2 doses of 0.5 Dilaudid, as well as vancomycin and cefepime in ED  · STOP Cefepime & vancomycin [MRSA negative]  · START Ancef   · Blood culture no growth at 24 hours  · Doppler study pending  · CXR pending  · Normal saline 75 cc/hr  · Continue to trend procal  · 7.02 --> 5.83-->2.63

## 2023-10-07 NOTE — PROGRESS NOTES
Dillon Pereira is a 44 y.o. male who is currently ordered Vancomycin IV with management by the Pharmacy Consult service. Relevant clinical data and objective / subjective history reviewed. Vancomycin Assessment:  Indication and Goal AUC/Trough: Soft tissue (goal -600, trough >10), -600, trough >10  Clinical Status: stable  Micro:   Pending  Renal Function:  SCr: 1.22 mg/dL  CrCl: 119.2 mL/min  Renal replacement: Not on dialysis  Days of Therapy: 2  Current Dose: 1500 mg IV x 1 dose  Vancomycin Plan:  New Dosin mg IV q12h  Estimated AUC: 448 mcg*hr/mL  Estimated Trough: 12.4 mcg/mL  Next Level: 10/8/23 at 0600  Renal Function Monitoring: Daily BMP and [place] will continue to follow closely for s/sx of nephrotoxicity, infusion reactions and appropriateness of therapy. BMP and CBC will be ordered per protocol. We will continue to follow the patient’s culture results and clinical progress daily.     Ginger Salomon, Pharmacist

## 2023-10-07 NOTE — ASSESSMENT & PLAN NOTE
Patient admits to excessive soda consumption and unhealthy eating with his son  · Consult nutrition  · D/C w/sleep study referral

## 2023-10-07 NOTE — ASSESSMENT & PLAN NOTE
Lab Results   Component Value Date    HGBA1C 11.9 (H) 10/07/2023       Recent Labs     10/08/23  1105 10/08/23  1551 10/08/23  2108 10/09/23  0652   POCGLU 154* 146* 159* 131       Blood Sugar Average: Last 72 hrs:  (P) 154       Patient reports that at home he usually sees sugars around 150s but recently his diet has been poor (attributes to soda)    · Basal Insulin 12 unit  · Sliding scale insulin  · Hypoglycemia protocol activated

## 2023-10-07 NOTE — ASSESSMENT & PLAN NOTE
Pt has soft blood blood pressure systolic around 52F    · Hold Amlodipine and Lisinopril due to CLOVIS  · Continue Coreg w/hold parameters

## 2023-10-07 NOTE — ASSESSMENT & PLAN NOTE
Chronic, stable  Patient has symptoms in the lower extremities.   Denies symptoms in armpits, scalp, neck    · Follow-up with wound care  · Takes [facility] outpatient

## 2023-10-07 NOTE — ED PROVIDER NOTES
History  Chief Complaint   Patient presents with   • Vomiting     Pt c/o vomiting, dizziness, diaphoresis, lymphedema L leg pain and wound inner thigh. Pt has HX diabetes     HPI  Patient is a 16-year-old male history of diabetes, CAD, hypertension, hyperlipidemia, lymphedema left lower extremity, hidradenitis suppurativa presenting for evaluation of 2 days of nausea, vomiting, general malaise, lightheadedness, diaphoresis, severe pain in his left lower extremity. Patient states that over the course of the past few weeks he noted slight increase in swelling of his left lower extremity and over the course the last 2 days noted redness, warmth, severe pain. Patient follows with wound care for hidradenitis suppurativa. Patient denies chest pain, shortness of breath, palpitations, syncope or near syncope. Prior to Admission Medications   Prescriptions Last Dose Informant Patient Reported? Taking? Adalimumab (Humira Pen) 80 MG/0.8ML PNKT   No No   Sig: Inject 80 mg on week 6 then every other week there after for maintenance dose. Adalimumab (Humira Pen) 80 MG/0.8ML PNKT   No No   Sig: Inject 160 mg on week 0 then 80 mg on week 2 and week 4 for loading dose.    EPINEPHrine (EPIPEN) 0.3 mg/0.3 mL SOAJ   No No   Sig: Inject 0.3 mL (0.3 mg total) into a muscle once for 1 dose   Insulin Pen Needle (PEN NEEDLES 29GX1/2") 29G X 12MM MISC   No No   Sig: Use 1 needle daily for subcutaneous injections   Patient not taking: Reported on 5/4/2022   Omega-3 Fatty Acids (fish oil) 1,000 mg   No No   Sig: Take 1 capsule (1,000 mg total) by mouth 2 (two) times a day   Victoza injection   No No   Sig: INJECT 0.6MG UNDER THE SKIN DAILY FOR A WEEK, THEN INCREASE TO 1.2MG DAILY ON THE SECOND WEEK, THEN INCREASE TO 1.8MG DAILY ON THE THIRD WEEK AND GOING FORWARD   amLODIPine (NORVASC) 10 mg tablet   No No   Sig: Take 1 tablet (10 mg total) by mouth daily   aspirin (ECOTRIN LOW STRENGTH) 81 mg EC tablet   No No   Sig: Take 1 tablet (81 mg total) by mouth daily   atorvastatin (LIPITOR) 80 mg tablet   No No   Sig: Take 1 tablet (80 mg total) by mouth daily   carvedilol (COREG) 6.25 mg tablet   No No   Sig: TAKE ONE TABLET BY MOUTH TWICE A DAY WITH MEALS   cetirizine (ZyrTEC) 10 mg tablet   No No   Sig: Take 1 tablet (10 mg total) by mouth daily   clindamycin (CLEOCIN T) 1 % lotion   No No   Sig: Apply topically 2 (two) times a day   clindamycin (CLINDAGEL) 1 % gel   No No   Sig: Apply topically 2 (two) times a day   lisinopril (ZESTRIL) 20 mg tablet   No No   Sig: Take 1 tablet (20 mg total) by mouth daily   metFORMIN (GLUCOPHAGE) 1000 MG tablet   No No   Sig: Take 1 tablet (1,000 mg total) by mouth 2 (two) times a day with meals   mupirocin (BACTROBAN) 2 % ointment   No No   Sig: Apply topically daily   Patient not taking: Reported on 1/10/2023   zinc sulfate (ZINCATE) 220 mg capsule   No No   Sig: Take 1 capsule (220 mg total) by mouth daily      Facility-Administered Medications: None       Past Medical History:   Diagnosis Date   • Arthritis    • Breathing difficulty    • Coronary artery disease    • Diabetes mellitus (HCC)    • Diabetic neuropathy (HCC) 5/28/2021   • Heart disease     CAD   s/p ptca with 1 stent 2012   • Hidradenitis suppurativa     groin   • History of transfusion    • Hyperlipidemia    • Hypertension    • MI (myocardial infarction) ([address])     " silent " M.I. in the past   • Morbid obesity with BMI of 50.0-59.9, adult (HCC)    • Nicotine dependence    • Obesity    • Pilonidal cyst    • Type 1 non-ST elevation myocardial infarction (NSTEMI) ([address]) 11/29/2020       Past Surgical History:   Procedure Laterality Date   • CARDIAC SURGERY     • CORONARY ANGIOPLASTY WITH STENT PLACEMENT     • INCISION AND DRAINAGE OF WOUND N/A 1/24/2017    Procedure: INCISION AND DRAINAGE (I&D) BUTTOCK, PILONIDAL CYST;  Surgeon: Litzy Palma MD;  Location: [facility];  Service:    • LEG SURGERY Left     I&D of left leg 2012   • NY DEBRIDEMENT SUBCUTANEOUS TISSUE 20 SQ CM/< Left 2021    Procedure: DEBRIDEMENT WOUND [facility] OUT); Surgeon: Anahi Multani MD;  [facility];  Service: General   • NY EXC B9 LESION MRGN XCP SK TG T/A/L >4.0 CM Left 2021    Procedure: EXCISION THIGH MASS;  Surgeon: Anahi Multani MD;  [facility];  Service: General   • NY EXCISION H/P/P/U COMPLEX REPAIR Left 2021    Procedure: EXCISION PERINEAL ABSCESS LEFT THIGH;  Surgeon: Anahi Multani MD;  [facility];  Service: General   • NY NEGATIVE PRESSURE WOUND THERAPY DME </= 50 SQ CM Left 2021    Procedure: APPLICATION VAC DRESSING THIGH;  Surgeon: Anahi Multani MD;  [facility];  Service: General   • WOUND DEBRIDEMENT Left 2021    Procedure: DEBRIDEMENT WOUND AND DRESSING CHANGE ([address] [place] [address] OUT); Surgeon: Negar Leiva DO;  [facility];  Service: General   • WOUND DEBRIDEMENT Left 2021    Procedure: DEBRIDEMENT LOWER EXTREMITY [facility] OUT), left groin and thigh;  Surgeon: Anahi Multani MD;  [facility];  Service: General   • WOUND DEBRIDEMENT Left 2021    Procedure: DEBRIDEMENT LOWER EXTREMITY [facility] OUT); Surgeon: Morelia Rodriguez DO;  [facility];  Service: General   • WOUND DEBRIDEMENT Left 2021    Procedure: Washout left thigh wound and closure;  Surgeon: Gunnar Kay DO;  [facility];  Service: General       Family History   Problem Relation Age of Onset   • Heart disease Father    • Diabetes Father    • Hypertension Mother    • Heart disease Mother      I have reviewed and agree with the history as documented.     E-Cigarette/Vaping   • E-Cigarette Use Never User      E-Cigarette/Vaping Substances   • Nicotine No    • THC No    • CBD No    • Flavoring No      Social History     Tobacco Use   • Smoking status: Former     Packs/day: 1.50     Years: 20.00     Total pack years: 30.00     Types: Cigarettes     Start date: 2021     Quit date: 2021     Years since quittin.6   • Smokeless tobacco: Never   Vaping Use   • Vaping Use: Never used   Substance Use Topics   • Alcohol use: Not Currently     Comment: rarely   • Drug use: No       Review of Systems   Constitutional: Positive for fatigue. Negative for chills and fever. Respiratory: Negative for cough, chest tightness and shortness of breath. Cardiovascular: Positive for leg swelling. Negative for chest pain. Gastrointestinal: Positive for nausea and vomiting. Negative for abdominal pain and diarrhea. Musculoskeletal: Negative for arthralgias and myalgias. Skin: Positive for color change and wound. Negative for pallor and rash. Neurological: Positive for headaches. Psychiatric/Behavioral: Negative for confusion. All other systems reviewed and are negative. Physical Exam  Physical Exam  Vitals and nursing note reviewed. Constitutional:       General: He is not in acute distress. Appearance: Normal appearance. He is ill-appearing. He is not toxic-appearing or diaphoretic. Comments: Ill-appearing, moderately distressed   HENT:      Head: Normocephalic and atraumatic. Comments: Moist mucous membranes     Right Ear: External ear normal.      Left Ear: External ear normal.   Eyes:      General:         Right eye: No discharge. Left eye: No discharge. Cardiovascular:      Comments: Regular rate and rhythm, no murmurs rubs or gallops. Extremities warm and well-perfused without mottling. Pulmonary:      Effort: No respiratory distress. Comments: No increased work of breathing. Speaking in complete sentences. Lungs clear to auscultation bilaterally without wheezes, rales, rhonchi. Satting 92 to 94% on room air. Abdominal:      General: There is no distension.       Comments: Abdomen soft, nontender, nondistended without rigidity, rebound, guarding   Genitourinary:     Comments: Chronic wounds consistent with patient's history of hidradenitis suppurativa in the inguinal area without tracking erythema, warmth, or tenderness. Musculoskeletal:         General: No deformity. Cervical back: Normal range of motion. Comments: Profound erythema, warmth, tenderness left lower extremity consistent with cellulitis   Skin:     Findings: No lesion or rash. Neurological:      Mental Status: He is alert and oriented to person, place, and time. Mental status is at baseline.       Comments: AAOx4, pleasant, interactive   Psychiatric:         Mood and Affect: Mood and affect normal.         Vital Signs  ED Triage Vitals   Temperature Pulse Respirations Blood Pressure SpO2   10/06/23 2100 10/06/23 2106 10/06/23 2106 10/06/23 2106 10/06/23 2106   98.9 °F (37.2 °C) 91 20 122/71 98 %      Temp Source Heart Rate Source Patient Position - Orthostatic VS BP Location FiO2 (%)   10/06/23 2100 10/06/23 2106 10/06/23 2106 10/06/23 2106 --   Oral Monitor Lying Right arm       Pain Score       10/06/23 2106       8           Vitals:    10/06/23 2230 10/06/23 2245 10/06/23 2300 10/06/23 2330   BP: 124/64 123/64 148/66 124/61   Pulse: 80 78 81 83   Patient Position - Orthostatic VS:             Visual Acuity      ED Medications  Medications   vancomycin (VANCOCIN) 1500 mg in sodium chloride 0.9% 250 mL IVPB (1,500 mg Intravenous New Bag 10/6/23 2303)   ondansetron (ZOFRAN) injection 4 mg (has no administration in time range)   HYDROmorphone (DILAUDID) injection 0.5 mg (has no administration in time range)   ondansetron (ZOFRAN) injection 4 mg (4 mg Intravenous Given 10/6/23 2144)   sodium chloride 0.9 % bolus 1,000 mL (1,000 mL Intravenous New Bag 10/6/23 2146)   HYDROmorphone (DILAUDID) injection 0.5 mg (0.5 mg Intravenous Given 10/6/23 2227)   cefepime (MAXIPIME) IVPB (premix in dextrose) 2,000 mg 50 mL (0 mg Intravenous Stopped 10/6/23 2258)       Diagnostic Studies  Results Reviewed     Procedure Component Value Units Date/Time    HS Troponin I 2hr [[id number]]     Lab Status: No result Specimen: Blood     HS Troponin 0hr (reflex protocol) [[id number]]  (Normal) Collected: 10/06/23 2301    Lab Status: Final result Specimen: Blood from Arm, Left Updated: 10/06/23 2328     hs TnI 0hr 13 ng/L     COVID/FLU/RSV [[id number]]  (Normal) Collected: 10/06/23 2140    Lab Status: Final result Specimen: Nares from Nose Updated: 10/06/23 2302     SARS-CoV-2 Negative     INFLUENZA A PCR Negative     INFLUENZA B PCR Negative     RSV PCR Negative    Narrative:      FOR PEDIATRIC PATIENTS - copy/paste COVID Guidelines URL to browser: [email or web address]    SARS-CoV-2 assay is a Nucleic Acid Amplification assay intended for the  qualitative detection of nucleic acid from SARS-CoV-2 in nasopharyngeal  swabs. Results are for the presumptive identification of SARS-CoV-2 RNA. Positive results are indicative of infection with SARS-CoV-2, the virus  causing COVID-19, but do not rule out bacterial infection or co-infection  with other viruses. Laboratories within the [facility] and its  territories are required to report all positive results to the appropriate  public health authorities. Negative results do not preclude SARS-CoV-2  infection and should not be used as the sole basis for treatment or other  patient management decisions. Negative results must be combined with  clinical observations, patient history, and epidemiological information. This test has not been FDA cleared or approved. This test has been authorized by FDA under an Emergency Use Authorization  (EUA). This test is only authorized for the duration of time the  declaration that circumstances exist justifying the authorization of the  emergency use of an in vitro diagnostic tests for detection of SARS-CoV-2  virus and/or diagnosis of COVID-19 infection under section 564(b)(1) of  the Act, 21 U. S.C. 312KTW-6(N)(5), unless the authorization is terminated  or revoked sooner.  The test has been validated but independent review by FDA  and CLIA is pending. Test performed using [employer] GeneXpert: This RT-PCR assay targets N2,  a region unique to SARS-CoV-2. A conserved region in the E-gene was chosen  for pan-Sarbecovirus detection which includes SARS-CoV-2. According to CMS-2020-01-R, this platform meets the definition of high-throughput technology. RBC Morphology Reflex Test [[id number]] Collected: 10/06/23 2141    Lab Status: Final result Specimen: Blood from Arm, Left Updated: 10/06/23 2301    CBC and differential [[id number]]  (Abnormal) Collected: 10/06/23 2141    Lab Status: Final result Specimen: Blood from Arm, Left Updated: 10/06/23 2238     WBC 16.18 Thousand/uL      RBC 4.73 Million/uL      Hemoglobin 13.6 g/dL      Hematocrit 40.5 %      MCV 86 fL      MCH 28.8 pg      MCHC 33.6 g/dL      RDW 14.0 %      MPV 10.0 fL      Platelets 439 Thousands/uL     Narrative: This is an appended report. These results have been appended to a previously verified report.     Manual Differential(PHLEBS Do Not Order) [[id number]]  (Abnormal) Collected: 10/06/23 2141    Lab Status: Final result Specimen: Blood from Arm, Left Updated: 10/06/23 2238     Segmented % 81 %      Bands % 13 %      Lymphocytes % 2 %      Monocytes % 4 %      Eosinophils, % 0 %      Basophils % 0 %      Absolute Neutrophils 15.21 Thousand/uL      Lymphocytes Absolute 0.32 Thousand/uL      Monocytes Absolute 0.65 Thousand/uL      Eosinophils Absolute 0.00 Thousand/uL      Basophils Absolute 0.00 Thousand/uL      Total Counted --     RBC Morphology Present     Platelet Estimate Adequate     Anisocytosis Present     Polychromasia Present    Procalcitonin [[id number]]  (Abnormal) Collected: 10/06/23 2141    Lab Status: Final result Specimen: Blood from Arm, Left Updated: 10/06/23 2221     Procalcitonin 7.02 ng/ml     Comprehensive metabolic panel [[id number]]  (Abnormal) Collected: 10/06/23 2141    Lab Status: Final result Specimen: Blood from Arm, Left Updated: 10/06/23 2216     Sodium 130 mmol/L      Potassium 4.9 mmol/L      Chloride 92 mmol/L      CO2 27 mmol/L      ANION GAP 11 mmol/L      BUN 20 mg/dL      Creatinine 1.21 mg/dL      Glucose 140 mg/dL      Calcium 8.3 mg/dL      Corrected Calcium 8.9 mg/dL      AST 36 U/L      ALT 11 U/L      Alkaline Phosphatase 79 U/L      Total Protein 7.7 g/dL      Albumin 3.3 g/dL      Total Bilirubin 0.65 mg/dL      eGFR 73 ml/min/1.73sq m     Narrative:      [facility] guidelines for Chronic Kidney Disease (CKD):   •  Stage 1 with normal or high GFR (GFR > 90 mL/min/1.73 square meters)  •  Stage 2 Mild CKD (GFR = 60-89 mL/min/1.73 square meters)  •  Stage 3A Moderate CKD (GFR = 45-59 mL/min/1.73 square meters)  •  Stage 3B Moderate CKD (GFR = 30-44 mL/min/1.73 square meters)  •  Stage 4 Severe CKD (GFR = 15-29 mL/min/1.73 square meters)  •  Stage 5 End Stage CKD (GFR <15 mL/min/1.73 square meters)  Note: GFR calculation is accurate only with a steady state creatinine    Lactic acid, plasma (w/reflex if result > 2.0) [[id number]]  (Normal) Collected: 10/06/23 2141    Lab Status: Final result Specimen: Blood from Arm, Left Updated: 10/06/23 2216     LACTIC ACID 1.1 mmol/L     Narrative:      Result may be elevated if tourniquet was used during collection. Blood culture #2 [[id number]] Collected: 10/06/23 2143    Lab Status: In process Specimen: Blood from Hand, Right Updated: 10/06/23 2147    Blood culture #1 [[id number]] Collected: 10/06/23 2141    Lab Status:  In process Specimen: Blood from Arm, Left Updated: 10/06/23 2146    Fingerstick Glucose (POCT) [[id number]]  (Abnormal) Collected: 10/06/23 2104    Lab Status: Final result Updated: 10/06/23 2105     POC Glucose 155 mg/dl                  XR tibia fibula 2 views LEFT    (Results Pending)              Procedures  Procedures         ED Course                               SBIRT 20yo+    Flowsheet Row Most Recent Value   Initial Alcohol Screen: US AUDIT-C     1. How often do you have a drink containing alcohol? 0 Filed at: 10/06/2023 2133   2. How many drinks containing alcohol do you have on a typical day you are drinking? 0 Filed at: 10/06/2023 2133   3a. Male UNDER 65: How often do you have five or more drinks on one occasion? 0 Filed at: 10/06/2023 2133   3b. FEMALE Any Age, or MALE 65+: How often do you have 4 or more drinks on one occassion? 0 Filed at: 10/06/2023 2103   Audit-C Score 0 Filed at: 10/06/2023 2133   ALEX: How many times in the past year have you. .. Used an illegal drug or used a prescription medication for non-medical reasons? Never Filed at: 10/06/2023 2133                    Medical Decision Making  I obtained history from the patient. I reviewed external medical documentation. Patient follows with wound care, most recently evaluated on 9/6/2023 for hidradenitis suppurativa, diabetes, had wound debridement of right and left medial thigh wounds, and was continued on clindamycin. Patient with symptoms concerning for cellulitis with constitutional symptoms. I ordered and reviewed labs including CBC, CMP, lactate, blood cultures, procalcitonin. Patient with leukocytosis of 16, procalcitonin of 7. Treated with broad-spectrum antibiotics vancomycin/cefepime, Dilaudid for pain control, Zofran. I ordered and independently interpreted plain films of the left lower extremity and do not appreciate subcutaneous air. I discussed patient with the hospitalist and admitted patient for further evaluation and management. Amount and/or Complexity of Data Reviewed  Labs: ordered. Radiology: ordered. Risk  Prescription drug management. Decision regarding hospitalization.           Disposition  Final diagnoses:   Nausea & vomiting   Cellulitis   Diabetes ([address])     Time reflects when diagnosis was documented in both MDM as applicable and the Disposition within this note     Time User Action Codes Description Comment    10/6/2023 11:57 PM Yury Ovalles Add [R11.2] Nausea & vomiting     10/6/2023 11:57 PM Yury Ovalles Add [L03.90] Cellulitis     10/6/2023 11:57 PM Yury Ovalles Add [E11.9] Diabetes [facility])       ED Disposition     ED Disposition   Admit    Condition   Stable    Date/Time   Fri Oct 6, 2023 11:57 PM    Comment   Case was discussed with [facility] and the patient's admission status was agreed to be Admission Status: inpatient status to the service of Dr. Germania Robertson . Follow-up Information    None         Patient's Medications   Discharge Prescriptions    No medications on file       No discharge procedures on file.     PDMP Review       Value Time User    PDMP Reviewed  Yes 4/29/2021 10:52 AM Shelby García PA-C          ED Provider  Electronically Signed by           Yury Ovalles MD  10/07/23 8151

## 2023-10-08 PROBLEM — J96.01 ACUTE HYPOXIC RESPIRATORY FAILURE (HCC): Status: RESOLVED | Noted: 2023-10-07 | Resolved: 2023-10-08

## 2023-10-08 LAB
ALBUMIN SERPL BCP-MCNC: 2.8 G/DL (ref 3.5–5)
ALP SERPL-CCNC: 68 U/L (ref 34–104)
ALT SERPL W P-5'-P-CCNC: 9 U/L (ref 7–52)
ANION GAP SERPL CALCULATED.3IONS-SCNC: 8 MMOL/L
ANION GAP SERPL CALCULATED.3IONS-SCNC: 8 MMOL/L
AST SERPL W P-5'-P-CCNC: 16 U/L (ref 13–39)
BACTERIA UR QL AUTO: ABNORMAL /HPF
BASOPHILS # BLD AUTO: 0.03 THOUSANDS/ÂΜL (ref 0–0.1)
BASOPHILS NFR BLD AUTO: 0 % (ref 0–1)
BILIRUB SERPL-MCNC: 0.37 MG/DL (ref 0.2–1)
BILIRUB UR QL STRIP: NEGATIVE
BUN SERPL-MCNC: 37 MG/DL (ref 5–25)
BUN SERPL-MCNC: 40 MG/DL (ref 5–25)
CALCIUM ALBUM COR SERPL-MCNC: 8.5 MG/DL (ref 8.3–10.1)
CALCIUM SERPL-MCNC: 7.5 MG/DL (ref 8.4–10.2)
CALCIUM SERPL-MCNC: 7.8 MG/DL (ref 8.4–10.2)
CHLORIDE SERPL-SCNC: 93 MMOL/L (ref 96–108)
CHLORIDE SERPL-SCNC: 95 MMOL/L (ref 96–108)
CLARITY UR: CLEAR
CO2 SERPL-SCNC: 27 MMOL/L (ref 21–32)
CO2 SERPL-SCNC: 27 MMOL/L (ref 21–32)
COLOR UR: ABNORMAL
CREAT SERPL-MCNC: 1.85 MG/DL (ref 0.6–1.3)
CREAT SERPL-MCNC: 2.4 MG/DL (ref 0.6–1.3)
EOSINOPHIL # BLD AUTO: 0.15 THOUSAND/ÂΜL (ref 0–0.61)
EOSINOPHIL NFR BLD AUTO: 2 % (ref 0–6)
ERYTHROCYTE [DISTWIDTH] IN BLOOD BY AUTOMATED COUNT: 13.9 % (ref 11.6–15.1)
GFR SERPL CREATININE-BSD FRML MDRD: 32 ML/MIN/1.73SQ M
GFR SERPL CREATININE-BSD FRML MDRD: 43 ML/MIN/1.73SQ M
GLUCOSE SERPL-MCNC: 114 MG/DL (ref 65–140)
GLUCOSE SERPL-MCNC: 122 MG/DL (ref 65–140)
GLUCOSE SERPL-MCNC: 146 MG/DL (ref 65–140)
GLUCOSE SERPL-MCNC: 154 MG/DL (ref 65–140)
GLUCOSE SERPL-MCNC: 158 MG/DL (ref 65–140)
GLUCOSE SERPL-MCNC: 159 MG/DL (ref 65–140)
GLUCOSE UR STRIP-MCNC: NEGATIVE MG/DL
HCT VFR BLD AUTO: 34.5 % (ref 36.5–49.3)
HGB BLD-MCNC: 10.9 G/DL (ref 12–17)
HGB UR QL STRIP.AUTO: ABNORMAL
HYALINE CASTS #/AREA URNS LPF: ABNORMAL /LPF
IMM GRANULOCYTES # BLD AUTO: 0.03 THOUSAND/UL (ref 0–0.2)
IMM GRANULOCYTES NFR BLD AUTO: 0 % (ref 0–2)
KETONES UR STRIP-MCNC: NEGATIVE MG/DL
LEUKOCYTE ESTERASE UR QL STRIP: NEGATIVE
LYMPHOCYTES # BLD AUTO: 1.44 THOUSANDS/ÂΜL (ref 0.6–4.47)
LYMPHOCYTES NFR BLD AUTO: 20 % (ref 14–44)
MAGNESIUM SERPL-MCNC: 1.7 MG/DL (ref 1.9–2.7)
MCH RBC QN AUTO: 27.9 PG (ref 26.8–34.3)
MCHC RBC AUTO-ENTMCNC: 31.6 G/DL (ref 31.4–37.4)
MCV RBC AUTO: 89 FL (ref 82–98)
MONOCYTES # BLD AUTO: 0.65 THOUSAND/ÂΜL (ref 0.17–1.22)
MONOCYTES NFR BLD AUTO: 9 % (ref 4–12)
MRSA NOSE QL CULT: NORMAL
NEUTROPHILS # BLD AUTO: 4.99 THOUSANDS/ÂΜL (ref 1.85–7.62)
NEUTS SEG NFR BLD AUTO: 69 % (ref 43–75)
NITRITE UR QL STRIP: NEGATIVE
NON-SQ EPI CELLS URNS QL MICRO: ABNORMAL /HPF
NRBC BLD AUTO-RTO: 0 /100 WBCS
PH UR STRIP.AUTO: 5.5 [PH]
PLATELET # BLD AUTO: 271 THOUSANDS/UL (ref 149–390)
PMV BLD AUTO: 9.9 FL (ref 8.9–12.7)
POTASSIUM SERPL-SCNC: 3.3 MMOL/L (ref 3.5–5.3)
POTASSIUM SERPL-SCNC: 3.7 MMOL/L (ref 3.5–5.3)
PROT SERPL-MCNC: 6.4 G/DL (ref 6.4–8.4)
PROT UR STRIP-MCNC: ABNORMAL MG/DL
RBC # BLD AUTO: 3.9 MILLION/UL (ref 3.88–5.62)
RBC #/AREA URNS AUTO: ABNORMAL /HPF
SODIUM SERPL-SCNC: 128 MMOL/L (ref 135–147)
SODIUM SERPL-SCNC: 130 MMOL/L (ref 135–147)
SP GR UR STRIP.AUTO: 1.01 (ref 1–1.03)
UROBILINOGEN UR QL STRIP.AUTO: 0.2 E.U./DL
VANCOMYCIN SERPL-MCNC: 19.3 UG/ML (ref 10–20)
WBC # BLD AUTO: 7.29 THOUSAND/UL (ref 4.31–10.16)
WBC #/AREA URNS AUTO: ABNORMAL /HPF

## 2023-10-08 PROCEDURE — 83735 ASSAY OF MAGNESIUM: CPT

## 2023-10-08 PROCEDURE — 99232 SBSQ HOSP IP/OBS MODERATE 35: CPT | Performed by: INTERNAL MEDICINE

## 2023-10-08 PROCEDURE — 82948 REAGENT STRIP/BLOOD GLUCOSE: CPT

## 2023-10-08 PROCEDURE — 80202 ASSAY OF VANCOMYCIN: CPT | Performed by: STUDENT IN AN ORGANIZED HEALTH CARE EDUCATION/TRAINING PROGRAM

## 2023-10-08 PROCEDURE — 80053 COMPREHEN METABOLIC PANEL: CPT

## 2023-10-08 PROCEDURE — 80048 BASIC METABOLIC PNL TOTAL CA: CPT

## 2023-10-08 PROCEDURE — 81001 URINALYSIS AUTO W/SCOPE: CPT

## 2023-10-08 PROCEDURE — 85025 COMPLETE CBC W/AUTO DIFF WBC: CPT

## 2023-10-08 RX ORDER — POTASSIUM CHLORIDE 14.9 MG/ML
20 INJECTION INTRAVENOUS ONCE
Status: DISCONTINUED | OUTPATIENT
Start: 2023-10-08 | End: 2023-10-08

## 2023-10-08 RX ORDER — POTASSIUM CHLORIDE 20 MEQ/1
20 TABLET, EXTENDED RELEASE ORAL ONCE
Status: COMPLETED | OUTPATIENT
Start: 2023-10-08 | End: 2023-10-08

## 2023-10-08 RX ORDER — VANCOMYCIN HYDROCHLORIDE 1 G/200ML
10 INJECTION, SOLUTION INTRAVENOUS DAILY PRN
Status: DISCONTINUED | OUTPATIENT
Start: 2023-10-09 | End: 2023-10-09

## 2023-10-08 RX ORDER — LANOLIN ALCOHOL/MO/W.PET/CERES
800 CREAM (GRAM) TOPICAL 2 TIMES DAILY
Status: COMPLETED | OUTPATIENT
Start: 2023-10-08 | End: 2023-10-08

## 2023-10-08 RX ORDER — TRAMADOL HYDROCHLORIDE 50 MG/1
50 TABLET ORAL ONCE
Status: COMPLETED | OUTPATIENT
Start: 2023-10-08 | End: 2023-10-08

## 2023-10-08 RX ORDER — ACETAMINOPHEN 325 MG/1
650 TABLET ORAL EVERY 4 HOURS PRN
Status: DISCONTINUED | OUTPATIENT
Start: 2023-10-08 | End: 2023-10-10 | Stop reason: HOSPADM

## 2023-10-08 RX ORDER — POTASSIUM CHLORIDE 29.8 MG/ML
40 INJECTION INTRAVENOUS ONCE
Status: DISCONTINUED | OUTPATIENT
Start: 2023-10-08 | End: 2023-10-08 | Stop reason: CLARIF

## 2023-10-08 RX ORDER — POTASSIUM CHLORIDE 14.9 MG/ML
20 INJECTION INTRAVENOUS ONCE
Status: COMPLETED | OUTPATIENT
Start: 2023-10-08 | End: 2023-10-08

## 2023-10-08 RX ORDER — SODIUM CHLORIDE 9 MG/ML
75 INJECTION, SOLUTION INTRAVENOUS CONTINUOUS
Status: DISCONTINUED | OUTPATIENT
Start: 2023-10-08 | End: 2023-10-10

## 2023-10-08 RX ADMIN — Medication 800 MG: at 17:07

## 2023-10-08 RX ADMIN — INSULIN LISPRO 2 UNITS: 100 INJECTION, SOLUTION INTRAVENOUS; SUBCUTANEOUS at 11:30

## 2023-10-08 RX ADMIN — CEFAZOLIN SODIUM 2000 MG: 2 SOLUTION INTRAVENOUS at 02:01

## 2023-10-08 RX ADMIN — CEFAZOLIN SODIUM 2000 MG: 2 SOLUTION INTRAVENOUS at 09:15

## 2023-10-08 RX ADMIN — OXYCODONE HYDROCHLORIDE 5 MG: 5 TABLET ORAL at 21:06

## 2023-10-08 RX ADMIN — OXYCODONE HYDROCHLORIDE 5 MG: 5 TABLET ORAL at 06:06

## 2023-10-08 RX ADMIN — HYDROMORPHONE HYDROCHLORIDE 1 MG: 1 INJECTION, SOLUTION INTRAMUSCULAR; INTRAVENOUS; SUBCUTANEOUS at 13:51

## 2023-10-08 RX ADMIN — INSULIN GLARGINE 12 UNITS: 100 INJECTION, SOLUTION SUBCUTANEOUS at 21:09

## 2023-10-08 RX ADMIN — Medication 800 MG: at 08:24

## 2023-10-08 RX ADMIN — ACETAMINOPHEN 650 MG: 325 TABLET ORAL at 21:09

## 2023-10-08 RX ADMIN — ACETAMINOPHEN 650 MG: 325 TABLET ORAL at 14:31

## 2023-10-08 RX ADMIN — OMEGA-3 FATTY ACIDS CAP 1000 MG 1000 MG: 1000 CAP at 17:07

## 2023-10-08 RX ADMIN — HYDROMORPHONE HYDROCHLORIDE 1 MG: 1 INJECTION, SOLUTION INTRAMUSCULAR; INTRAVENOUS; SUBCUTANEOUS at 09:14

## 2023-10-08 RX ADMIN — HYDROMORPHONE HYDROCHLORIDE 1 MG: 1 INJECTION, SOLUTION INTRAMUSCULAR; INTRAVENOUS; SUBCUTANEOUS at 17:34

## 2023-10-08 RX ADMIN — ATORVASTATIN CALCIUM 80 MG: 40 TABLET, FILM COATED ORAL at 08:21

## 2023-10-08 RX ADMIN — HYDROMORPHONE HYDROCHLORIDE 1 MG: 1 INJECTION, SOLUTION INTRAMUSCULAR; INTRAVENOUS; SUBCUTANEOUS at 22:19

## 2023-10-08 RX ADMIN — ACETAMINOPHEN 650 MG: 325 TABLET ORAL at 06:06

## 2023-10-08 RX ADMIN — SODIUM CHLORIDE 125 ML/HR: 0.9 INJECTION, SOLUTION INTRAVENOUS at 17:33

## 2023-10-08 RX ADMIN — ENOXAPARIN SODIUM 60 MG: 60 INJECTION SUBCUTANEOUS at 21:10

## 2023-10-08 RX ADMIN — POTASSIUM CHLORIDE 20 MEQ: 14.9 INJECTION, SOLUTION INTRAVENOUS at 08:25

## 2023-10-08 RX ADMIN — CARVEDILOL 6.25 MG: 3.12 TABLET, FILM COATED ORAL at 16:52

## 2023-10-08 RX ADMIN — OXYCODONE HYDROCHLORIDE 5 MG: 5 TABLET ORAL at 11:15

## 2023-10-08 RX ADMIN — ENOXAPARIN SODIUM 60 MG: 60 INJECTION SUBCUTANEOUS at 08:22

## 2023-10-08 RX ADMIN — OMEGA-3 FATTY ACIDS CAP 1000 MG 1000 MG: 1000 CAP at 08:22

## 2023-10-08 RX ADMIN — TRAMADOL HYDROCHLORIDE 50 MG: 50 TABLET, COATED ORAL at 12:50

## 2023-10-08 RX ADMIN — CEFAZOLIN SODIUM 2000 MG: 2 SOLUTION INTRAVENOUS at 17:27

## 2023-10-08 RX ADMIN — ASPIRIN 81 MG CHEWABLE TABLET 81 MG: 81 TABLET CHEWABLE at 08:21

## 2023-10-08 RX ADMIN — SODIUM CHLORIDE 125 ML/HR: 0.9 INJECTION, SOLUTION INTRAVENOUS at 10:09

## 2023-10-08 RX ADMIN — OXYCODONE HYDROCHLORIDE 5 MG: 5 TABLET ORAL at 16:52

## 2023-10-08 RX ADMIN — POTASSIUM CHLORIDE 20 MEQ: 1500 TABLET, EXTENDED RELEASE ORAL at 08:23

## 2023-10-08 NOTE — PROGRESS NOTES
Daily Progress Note - [address] [place] 44 y.o. male MRN: [id number]  Unit/Bed#: [address] [facility] Encounter: [id number]  Admitting Physician: Attila Short MD   PCP: Royer Rodriguez MD  Date of Admission:  10/6/2023  8:56 PM    Assessment and Plan    * Sepsis [facility])  Assessment & Plan  Acute  As evidenced by WBC with bands tachypnea 25 and cellulitis on LLE  Pt reports pain x2 weeks and n/v x 2 days with 1 day/evening of dizziness/confusion  Exam shows as in photographs below a erythematous warm area over much of LLE  Leg XR shows no free are concerning for nec fac and inflamed/tender area does not involve groin (low concern for leonela gangrene)  · Given Zofran, 1 L normal saline, 2 doses of 0.5 Dilaudid, as well as vancomycin and cefepime in ED  · STOP Cefepime  · START Ancef   · Continue vancomycin pending cultures  · Blood culture no growth at 24 hours  · X-ray tibia-fibula pending read  · CXR pending  · Normal saline 125 cc/hr  · Tramadol 50mg IV once  · Continue to trend procal  · 7.02 --> 5.83        Cellulitis  Assessment & Plan  See plan under sepsis above                  Type 2 diabetes mellitus with diabetic polyneuropathy, without long-term current use of insulin [facility])  Assessment & Plan  Lab Results   Component Value Date    HGBA1C 11.9 (H) 10/07/2023       Recent Labs     10/07/23  1605 10/07/23  2154 10/08/23  0706 10/08/23  1105   POCGLU 200* 145* 114 154*       Blood Sugar Average: Last 72 hrs:  (P) [id number]       Patient reports that at home he usually sees sugars around 150s but recently his diet has been poor (attributes to soda)    · Basal Insulin 12 unit  · Sliding scale insulin  · Hypoglycemia protocol activated    CLOVIS (acute kidney injury) [facility])  Assessment & Plan    Recent Labs     10/06/23  2141 10/07/23  0447 10/08/23  0535 10/08/23  1435   CREATININE 1.21 1.22 2.40* 1.85*     Pt's baseline creatnine is 1.2's w/ an increase to  2.4>1. 85. Pt also had associate hyponatremia and Creatnine clearance of 60.7. · Start NS 125ml/hr  · Bladder scan  · Repeat BMP in 2 hours  · UA  · Discontinue Amlodipine & lisinopril  · Renal dose Ancef, vancomycin, Lovenox    Long term (current) use of immunomodulator  Assessment & Plan  Patient takes Humira outpatient for Hidradenitis suppurativa  -Follows with wound care outpatient    History of hidradenitis suppurativa  Assessment & Plan  Chronic, stable  Patient has symptoms in the lower extremities. Denies symptoms in armpits, scalp, neck    · Follow-up with wound care  · Takes Humira outpatient    Morbid obesity with body mass index (BMI) of 50.0 to 59.9 in adult [facility])  Assessment & Plan  Patient admits to excessive soda consumption  - Consult nutrition    Essential hypertension  Assessment & Plan  Pt has soft blood blood pressure systolic around 50A    · Discontinue Amlodipine  · Discontinue Lisinopril  · Continue Coreg w/hold parameters     Dyslipidemia  Assessment & Plan  Continue patient's home Lipitor and fish oil    Coronary artery disease  Assessment & Plan  Patient has history of catheterization in 2010 and 2020 including stent placement with a lateral catheterization.  - No acute chest pain or discomfort at this time    Acute hypoxic respiratory failure (HCC)-resolved as of 10/8/2023  Assessment & Plan  Tachypneic up to 25 with pulse ox of 94 while on 2 L nasal cannula  -Chest x-ray pending  - Suspect transient discomfort from nausea and pain  - Attempt to wean back to room air  - Supplemental O2 as needed   -Start incentive spirometry      VTE Pharmacologic Prophylaxis: VTE Score: 9 High Risk (Score >/= 5) - Pharmacological DVT Prophylaxis Ordered: enoxaparin (Lovenox). Sequential Compression Devices Ordered. Patient Centered Rounds: I have performed bedside rounds with nursing staff today.     Discussions with Specialists or Other Care Team Provider: ROGER    Education and Discussions with Family / Patient:   Patient Information Sharing: With the consent of Mehul Crews , their loved ones will be notified today by inpatient team of the patient’s condition and current plan. All questions answered. Time Spent for Care: 30 minutes. More than 50% of total time spent on counseling and coordination of care as described above. Current Length of Stay: 2 day(s)    Current Patient Status: Inpatient   Certification Statement: The patient will continue to require additional inpatient hospital stay due to treatment w/IV antibiotics and CLOVIS    Discharge Plan: Home    Code Status: Level 1 - Full Code    Subjective:   Patient seen and examined at bedside. States that he slept well through the night, had a bowel movement yesterday. Currently has mild lightheadedness when he tries to get out of bed. No dizziness or room spinning sensation. 6 out of 10 pain in the left lower extremity. No SOB, CP, nausea vomiting diarrhea. No other acute complaints at this time    Objective     Objective:   Vitals:   Temp (24hrs), Av.3 °F (36.8 °C), Min:97.4 °F (36.3 °C), Max:99 °F (37.2 °C)    Temp:  [97.4 °F (36.3 °C)-99 °F (37.2 °C)] 98.7 °F (37.1 °C)  HR:  [62-79] 72  Resp:  [14-20] 16  BP: (92-99)/(59-73) 96/59  SpO2:  [88 %-97 %] 95 %  Body mass index is 58.23 kg/m². Input and Output Summary (last 24 hours): Intake/Output Summary (Last 24 hours) at 10/8/2023 1512  Last data filed at 10/8/2023 1444  Gross per 24 hour   Intake 835.83 ml   Output 975 ml   Net -139.17 ml       Physical Exam:   Physical Exam  Constitutional:       General: He is not in acute distress. Appearance: He is obese. He is not ill-appearing, toxic-appearing or diaphoretic. Cardiovascular:      Rate and Rhythm: Normal rate and regular rhythm. Pulses: Normal pulses. Heart sounds: Normal heart sounds. Pulmonary:      Effort: Pulmonary effort is normal.      Breath sounds: Normal breath sounds. Abdominal:      General: There is no distension. Palpations: Abdomen is soft. Musculoskeletal:         General: Swelling (LLE) and tenderness (LLE) present. Right lower leg: No edema. Left lower leg: Edema present. Skin:     General: Skin is warm. Capillary Refill: Capillary refill takes less than 2 seconds. Neurological:      General: No focal deficit present. Mental Status: He is alert and oriented to person, place, and time. Mental status is at baseline. Psychiatric:         Mood and Affect: Mood normal.         Behavior: Behavior normal.         Thought Content: Thought content normal.         Judgment: Judgment normal.           Additional Data:     Labs:  Results from last 7 days   Lab Units 10/08/23  0535   WBC Thousand/uL 7.29   HEMOGLOBIN g/dL 10.9*   HEMATOCRIT % 34.5*   PLATELETS Thousands/uL 271   NEUTROS PCT % 69   LYMPHS PCT % 20   MONOS PCT % 9   EOS PCT % 2     Results from last 7 days   Lab Units 10/08/23  1435 10/08/23  0535   POTASSIUM mmol/L 3.7 3.3*   CHLORIDE mmol/L 95* 93*   CO2 mmol/L 27 27   BUN mg/dL 37* 40*   CREATININE mg/dL 1.85* 2.40*   CALCIUM mg/dL 7.8* 7.5*   ALK PHOS U/L  --  68   ALT U/L  --  9   AST U/L  --  16         Results from last 7 days   Lab Units 10/08/23  1105 10/08/23  0706 10/07/23  2154 10/07/23  1605 10/07/23  1101   POC GLUCOSE mg/dl 154* 114 145* 200* 181*     Results from last 7 days   Lab Units 10/07/23  0447   HEMOGLOBIN A1C % 11.9*       * I Have Reviewed All Lab Data Listed Above. * Additional Pertinent Lab Tests Reviewed: [facility] Admission Reviewed    Imaging:    Imaging Reports Reviewed Today Include: yes  Imaging Personally Reviewed by Myself Includes:  yes    Recent Cultures (last 7 days):     Results from last 7 days   Lab Units 10/06/23  2143 10/06/23  2141   BLOOD CULTURE  No Growth at 24 hrs. No Growth at 24 hrs.        Last 24 Hours Medication List:   Current Facility-Administered Medications Medication Dose Route Frequency Provider Last Rate   • acetaminophen  650 mg Oral Q8H Dalton Barton MD     • acetaminophen  650 mg Oral Q4H PRN Karly Toussaint MD     • aspirin  81 mg Oral Daily [facility] Rashid, DO     • atorvastatin  80 mg Oral Daily [facility] Gay DO     • carvedilol  6.25 mg Oral BID With Meals [facility], DO     • cefazolin  2,000 mg Intravenous Q8H Asia Myers, DO Stopped (10/08/23 1008)   • enoxaparin  60 mg Subcutaneous BID [facility]     • fish oil  1,000 mg Oral BID [facility], DO     • HYDROmorphone  1 mg Intravenous Q4H PRN [facility], DO     • insulin glargine  12 Units Subcutaneous HS Asia Myesr DO     • insulin lispro  2-12 Units Subcutaneous TID [facility] [place], DO     • magnesium Oxide  800 mg Oral BID Karly Toussaint MD     • ondansetron  4 mg Intravenous Q6H PRN [facility], DO     • oxyCODONE  5 mg Oral Q4H PRN [facility], DO     • sodium chloride  125 mL/hr Intravenous Continuous Karly Toussaint  mL/hr (10/08/23 1400)   • [START ON 10/9/2023] vancomycin  10 mg/kg (Adjusted) Intravenous Daily PRN [facility], DO               ** Please Note: Dictation voice to text software may have been used in the creation of this document.  Karly Toussaint MD  10/08/23  3:12 PM

## 2023-10-08 NOTE — PROGRESS NOTES
Jalyn Solano is a 44 y.o. male who is currently ordered Vancomycin IV with management by the Pharmacy Consult service. Relevant clinical data and objective / subjective history reviewed. Vancomycin Assessment:  Indication and Goal AUC/Trough: Soft tissue (goal -600, trough >10), -- (trough </= 15)  Clinical Status: worsening  Micro:   pending   Renal Function:  SCr: 2.4 mg/dL  CrCl: 60.6 mL/min  Renal replacement: Not on dialysis  Days of Therapy: 3  Current Dose: 1000 mg IV q12h  Vancomycin Plan:  New Dosin mg IV dailyprn when trough </= 15  Trough = 19.3  Vancomycin will be held today, 10/8/23  Next Level: 10/9/23 at 0600  Renal Function Monitoring: Daily BMP and [place] will continue to follow closely for s/sx of nephrotoxicity, infusion reactions and appropriateness of therapy. BMP and CBC will be ordered per protocol. We will continue to follow the patient’s culture results and clinical progress daily.     Jorge Jacome, Pharmacist

## 2023-10-08 NOTE — ASSESSMENT & PLAN NOTE
Recent Labs     10/07/23  0447 10/08/23  0535 10/08/23  1435 10/09/23  0504   CREATININE 1.22 2.40* 1.85* 1.33*     Pt's baseline creatnine is 1.2's w/ an increase to  2.4 on 10/9.  Pt also had associate hyponatremia and Creatnine clearance of 109.5     UA: Trace proteins, 1-2 hyaline cast, 4-10 RBC, Large occult blood    · Decrease NS  From 100 to 75ml/hr  · Discontinue Amlodipine & lisinopril  · Renal dose Ancef, vancomycin, Lovenox  · Avoid nephrotoxic drugs and hypotension

## 2023-10-08 NOTE — PLAN OF CARE
Problem: PAIN - ADULT  Goal: Verbalizes/displays adequate comfort level or baseline comfort level  Description: Interventions:  - Encourage patient to monitor pain and request assistance  - Assess pain using appropriate pain scale  - Administer analgesics based on type and severity of pain and evaluate response  - Implement non-pharmacological measures as appropriate and evaluate response  - Consider cultural and social influences on pain and pain management  - Notify physician/advanced practitioner if interventions unsuccessful or patient reports new pain  Outcome: Progressing     Problem: INFECTION - ADULT  Goal: Absence or prevention of progression during hospitalization  Description: INTERVENTIONS:  - Assess and monitor for signs and symptoms of infection  - Monitor lab/diagnostic results  - Monitor all insertion sites, i.e. indwelling lines, tubes, and drains  - Monitor endotracheal if appropriate and nasal secretions for changes in amount and color  - [place] appropriate cooling/warming therapies per order  - Administer medications as ordered  - Instruct and encourage patient and family to use good hand hygiene technique  - Identify and instruct in appropriate isolation precautions for identified infection/condition  Outcome: Progressing     Problem: SKIN/TISSUE INTEGRITY - ADULT  Goal: Skin Integrity remains intact(Skin Breakdown Prevention)  Description: Assess:  -Perform Juan assessment every 24  -Clean and moisturize skin every 24  -Inspect skin when repositioning, toileting, and assisting with ADLS    -Assess extremities for adequate circulation and sensation     Bed Management:  -Have minimal linens on bed & keep smooth, unwrinkled  -Change linens as needed when moist or perspiring  -Avoid sitting or lying in one position for more than 2 hours while in bed  -Keep HOB at 45degrees     Toileting:  -Offer bedside commode      Activity:  -Mobilize patient 3 times a day  -Encourage activity and walks on unit  -Encourage or provide ROM exercises   -Turn and reposition patient every 2 Hours  -Use appropriate equipment to lift or move patient in bed  -Instruct/ Assist with weight shifting every 2 when out of bed in chair  -Consider limitation of chair time 2 hour intervals    Skin Care:  -Avoid use of baby powder, tape, friction and shearing, hot water or constrictive clothing    -Do not massage red bony areas    Next Steps:  -Teach patient strategies to minimize risks such as reposition   -Consider consults to  interdisciplinary teams such as nutrition  Outcome: Progressing  Goal: Incision(s), wounds(s) or drain site(s) healing without S/S of infection  Description: INTERVENTIONS  - Assess and document dressing, incision, wound bed, drain sites and surrounding tissue  - Provide patient and family education  - Perform skin care/dressing changes every 24  Outcome: Progressing

## 2023-10-09 ENCOUNTER — APPOINTMENT (INPATIENT)
Dept: RADIOLOGY | Facility: HOSPITAL | Age: 43
DRG: 871 | End: 2023-10-09
Payer: COMMERCIAL

## 2023-10-09 LAB
ANION GAP SERPL CALCULATED.3IONS-SCNC: 7 MMOL/L
ATRIAL RATE: 75 BPM
BASOPHILS # BLD AUTO: 0.03 THOUSANDS/ÂΜL (ref 0–0.1)
BASOPHILS NFR BLD AUTO: 0 % (ref 0–1)
BUN SERPL-MCNC: 32 MG/DL (ref 5–25)
CALCIUM SERPL-MCNC: 7.7 MG/DL (ref 8.4–10.2)
CHLORIDE SERPL-SCNC: 98 MMOL/L (ref 96–108)
CO2 SERPL-SCNC: 28 MMOL/L (ref 21–32)
CREAT SERPL-MCNC: 1.33 MG/DL (ref 0.6–1.3)
EOSINOPHIL # BLD AUTO: 0.19 THOUSAND/ÂΜL (ref 0–0.61)
EOSINOPHIL NFR BLD AUTO: 3 % (ref 0–6)
ERYTHROCYTE [DISTWIDTH] IN BLOOD BY AUTOMATED COUNT: 13.6 % (ref 11.6–15.1)
GFR SERPL CREATININE-BSD FRML MDRD: 65 ML/MIN/1.73SQ M
GLUCOSE SERPL-MCNC: 120 MG/DL (ref 65–140)
GLUCOSE SERPL-MCNC: 128 MG/DL (ref 65–140)
GLUCOSE SERPL-MCNC: 131 MG/DL (ref 65–140)
GLUCOSE SERPL-MCNC: 173 MG/DL (ref 65–140)
GLUCOSE SERPL-MCNC: 176 MG/DL (ref 65–140)
HCT VFR BLD AUTO: 37 % (ref 36.5–49.3)
HGB BLD-MCNC: 11.6 G/DL (ref 12–17)
IMM GRANULOCYTES # BLD AUTO: 0.05 THOUSAND/UL (ref 0–0.2)
IMM GRANULOCYTES NFR BLD AUTO: 1 % (ref 0–2)
LYMPHOCYTES # BLD AUTO: 1.63 THOUSANDS/ÂΜL (ref 0.6–4.47)
LYMPHOCYTES NFR BLD AUTO: 24 % (ref 14–44)
MAGNESIUM SERPL-MCNC: 2.3 MG/DL (ref 1.9–2.7)
MCH RBC QN AUTO: 27.5 PG (ref 26.8–34.3)
MCHC RBC AUTO-ENTMCNC: 31.4 G/DL (ref 31.4–37.4)
MCV RBC AUTO: 88 FL (ref 82–98)
MONOCYTES # BLD AUTO: 0.62 THOUSAND/ÂΜL (ref 0.17–1.22)
MONOCYTES NFR BLD AUTO: 9 % (ref 4–12)
NEUTROPHILS # BLD AUTO: 4.4 THOUSANDS/ÂΜL (ref 1.85–7.62)
NEUTS SEG NFR BLD AUTO: 63 % (ref 43–75)
NRBC BLD AUTO-RTO: 0 /100 WBCS
P AXIS: 65 DEGREES
PLATELET # BLD AUTO: 300 THOUSANDS/UL (ref 149–390)
PMV BLD AUTO: 9.3 FL (ref 8.9–12.7)
POTASSIUM SERPL-SCNC: 3.7 MMOL/L (ref 3.5–5.3)
PR INTERVAL: 172 MS
PROCALCITONIN SERPL-MCNC: 2.69 NG/ML
QRS AXIS: 2 DEGREES
QRSD INTERVAL: 76 MS
QT INTERVAL: 382 MS
QTC INTERVAL: 426 MS
RBC # BLD AUTO: 4.22 MILLION/UL (ref 3.88–5.62)
SODIUM SERPL-SCNC: 133 MMOL/L (ref 135–147)
T WAVE AXIS: 33 DEGREES
VANCOMYCIN SERPL-MCNC: 7.7 UG/ML (ref 10–20)
VENTRICULAR RATE: 75 BPM
WBC # BLD AUTO: 6.92 THOUSAND/UL (ref 4.31–10.16)

## 2023-10-09 PROCEDURE — 85025 COMPLETE CBC W/AUTO DIFF WBC: CPT

## 2023-10-09 PROCEDURE — 82948 REAGENT STRIP/BLOOD GLUCOSE: CPT

## 2023-10-09 PROCEDURE — 84145 PROCALCITONIN (PCT): CPT

## 2023-10-09 PROCEDURE — 99238 HOSP IP/OBS DSCHRG MGMT 30/<: CPT | Performed by: STUDENT IN AN ORGANIZED HEALTH CARE EDUCATION/TRAINING PROGRAM

## 2023-10-09 PROCEDURE — 80048 BASIC METABOLIC PNL TOTAL CA: CPT

## 2023-10-09 PROCEDURE — 93971 EXTREMITY STUDY: CPT | Performed by: SURGERY

## 2023-10-09 PROCEDURE — 80202 ASSAY OF VANCOMYCIN: CPT | Performed by: STUDENT IN AN ORGANIZED HEALTH CARE EDUCATION/TRAINING PROGRAM

## 2023-10-09 PROCEDURE — 93971 EXTREMITY STUDY: CPT

## 2023-10-09 PROCEDURE — 83735 ASSAY OF MAGNESIUM: CPT

## 2023-10-09 PROCEDURE — 93010 ELECTROCARDIOGRAM REPORT: CPT | Performed by: INTERNAL MEDICINE

## 2023-10-09 PROCEDURE — 99232 SBSQ HOSP IP/OBS MODERATE 35: CPT | Performed by: INTERNAL MEDICINE

## 2023-10-09 RX ORDER — VANCOMYCIN HYDROCHLORIDE 1 G/200ML
10 INJECTION, SOLUTION INTRAVENOUS EVERY 12 HOURS
Status: DISCONTINUED | OUTPATIENT
Start: 2023-10-09 | End: 2023-10-09

## 2023-10-09 RX ADMIN — OMEGA-3 FATTY ACIDS CAP 1000 MG 1000 MG: 1000 CAP at 08:07

## 2023-10-09 RX ADMIN — ACETAMINOPHEN 650 MG: 325 TABLET ORAL at 13:58

## 2023-10-09 RX ADMIN — HYDROMORPHONE HYDROCHLORIDE 1 MG: 1 INJECTION, SOLUTION INTRAMUSCULAR; INTRAVENOUS; SUBCUTANEOUS at 08:07

## 2023-10-09 RX ADMIN — ENOXAPARIN SODIUM 60 MG: 60 INJECTION SUBCUTANEOUS at 21:16

## 2023-10-09 RX ADMIN — HYDROMORPHONE HYDROCHLORIDE 1 MG: 1 INJECTION, SOLUTION INTRAMUSCULAR; INTRAVENOUS; SUBCUTANEOUS at 13:19

## 2023-10-09 RX ADMIN — OXYCODONE HYDROCHLORIDE 5 MG: 5 TABLET ORAL at 15:56

## 2023-10-09 RX ADMIN — SODIUM CHLORIDE 100 ML/HR: 0.9 INJECTION, SOLUTION INTRAVENOUS at 01:49

## 2023-10-09 RX ADMIN — OXYCODONE HYDROCHLORIDE 5 MG: 5 TABLET ORAL at 20:01

## 2023-10-09 RX ADMIN — CARVEDILOL 6.25 MG: 3.12 TABLET, FILM COATED ORAL at 16:15

## 2023-10-09 RX ADMIN — HYDROMORPHONE HYDROCHLORIDE 1 MG: 1 INJECTION, SOLUTION INTRAMUSCULAR; INTRAVENOUS; SUBCUTANEOUS at 21:29

## 2023-10-09 RX ADMIN — CEFAZOLIN SODIUM 2000 MG: 2 SOLUTION INTRAVENOUS at 17:19

## 2023-10-09 RX ADMIN — ENOXAPARIN SODIUM 60 MG: 60 INJECTION SUBCUTANEOUS at 08:08

## 2023-10-09 RX ADMIN — POTASSIUM CHLORIDE 30 MEQ: 1500 TABLET, EXTENDED RELEASE ORAL at 07:11

## 2023-10-09 RX ADMIN — CEFAZOLIN SODIUM 2000 MG: 2 SOLUTION INTRAVENOUS at 01:47

## 2023-10-09 RX ADMIN — CEFAZOLIN SODIUM 2000 MG: 2 SOLUTION INTRAVENOUS at 11:00

## 2023-10-09 RX ADMIN — INSULIN GLARGINE 12 UNITS: 100 INJECTION, SOLUTION SUBCUTANEOUS at 21:17

## 2023-10-09 RX ADMIN — ATORVASTATIN CALCIUM 80 MG: 40 TABLET, FILM COATED ORAL at 08:07

## 2023-10-09 RX ADMIN — VANCOMYCIN HYDROCHLORIDE 1000 MG: 1 INJECTION, SOLUTION INTRAVENOUS at 08:41

## 2023-10-09 RX ADMIN — CARVEDILOL 6.25 MG: 3.12 TABLET, FILM COATED ORAL at 08:07

## 2023-10-09 RX ADMIN — ASPIRIN 81 MG CHEWABLE TABLET 81 MG: 81 TABLET CHEWABLE at 08:07

## 2023-10-09 RX ADMIN — OMEGA-3 FATTY ACIDS CAP 1000 MG 1000 MG: 1000 CAP at 17:19

## 2023-10-09 RX ADMIN — OXYCODONE HYDROCHLORIDE 5 MG: 5 TABLET ORAL at 04:57

## 2023-10-09 RX ADMIN — INSULIN LISPRO 2 UNITS: 100 INJECTION, SOLUTION INTRAVENOUS; SUBCUTANEOUS at 11:48

## 2023-10-09 RX ADMIN — ACETAMINOPHEN 650 MG: 325 TABLET ORAL at 05:05

## 2023-10-09 RX ADMIN — HYDROMORPHONE HYDROCHLORIDE 1 MG: 1 INJECTION, SOLUTION INTRAMUSCULAR; INTRAVENOUS; SUBCUTANEOUS at 17:26

## 2023-10-09 RX ADMIN — ACETAMINOPHEN 650 MG: 325 TABLET ORAL at 21:17

## 2023-10-09 RX ADMIN — OXYCODONE HYDROCHLORIDE 5 MG: 5 TABLET ORAL at 11:48

## 2023-10-09 RX ADMIN — INSULIN LISPRO 2 UNITS: 100 INJECTION, SOLUTION INTRAVENOUS; SUBCUTANEOUS at 16:16

## 2023-10-09 NOTE — DISCHARGE INSTR - OTHER ORDERS
Plan:   Skin care plans:    1-Cleanse wounds to bilateral thighs and abdomen with soap and water & pat dry. Apply Maxorb Ag+ silver alginate to wounds cut to size. Cover with ABD pad and paper tape. Change daily & as needed for soilage/dislodgement. 2-Hydraguard barrier cream or equivalent to bilateral sacrum, buttock and heels 2x/day and as needed. 3-Float heels on 2 pillows in bed to offload pressure so heels are not in contact with mattress or pillows. 4-Use pressure redistribution cushion in chair when out of bed. Limit sitting for 1-2 hours at a time. Elevate legs to heart level or higher whether OOB to chair or in bed. 5-Moisturize skin daily with skin nourishing cream.  6-Turn/reposition every 2 hrs for pressure re-distribution on skin. 7-Follow up at [facility].

## 2023-10-09 NOTE — PLAN OF CARE
Problem: PAIN - ADULT  Goal: Verbalizes/displays adequate comfort level or baseline comfort level  Description: Interventions:  - Encourage patient to monitor pain and request assistance  - Assess pain using appropriate pain scale  - Administer analgesics based on type and severity of pain and evaluate response  - Implement non-pharmacological measures as appropriate and evaluate response  - Consider cultural and social influences on pain and pain management  - Notify physician/advanced practitioner if interventions unsuccessful or patient reports new pain  Outcome: Progressing     Problem: INFECTION - ADULT  Goal: Absence or prevention of progression during hospitalization  Description: INTERVENTIONS:  - Assess and monitor for signs and symptoms of infection  - Monitor lab/diagnostic results  - Monitor all insertion sites, i.e. indwelling lines, tubes, and drains  - Monitor endotracheal if appropriate and nasal secretions for changes in amount and color  - [place] appropriate cooling/warming therapies per order  - Administer medications as ordered  - Instruct and encourage patient and family to use good hand hygiene technique  - Identify and instruct in appropriate isolation precautions for identified infection/condition  Outcome: Progressing     Problem: SKIN/TISSUE INTEGRITY - ADULT  Goal: Skin Integrity remains intact(Skin Breakdown Prevention)  Description: Assess:  -Perform Juan assessment every   -Clean and moisturize skin every   -Inspect skin when repositioning, toileting, and assisting with ADLS  -Assess under medical devices such as  every   -Assess extremities for adequate circulation and sensation     Bed Management:  -Have minimal linens on bed & keep smooth, unwrinkled  -Change linens as needed when moist or perspiring  -Avoid sitting or lying in one position for more than  hours while in bed  -Keep HOB at degrees     Toileting:  -Offer bedside commode  -Assess for incontinence every   -Use incontinent care products after each incontinent episode such as     Activity:  -Mobilize patient  times a day  -Encourage activity and walks on unit  -Encourage or provide ROM exercises   -Turn and reposition patient every  Hours  -Use appropriate equipment to lift or move patient in bed  -Instruct/ Assist with weight shifting every  when out of bed in chair  -Consider limitation of chair time  hour intervals    Skin Care:  -Avoid use of baby powder, tape, friction and shearing, hot water or constrictive clothing  -Relieve pressure over bony prominences using   -Do not massage red bony areas    Next Steps:  -Teach patient strategies to minimize risks such as    -Consider consults to  interdisciplinary teams such as   Outcome: Progressing  Goal: Incision(s), wounds(s) or drain site(s) healing without S/S of infection  Description: INTERVENTIONS  - Assess and document dressing, incision, wound bed, drain sites and surrounding tissue  - Provide patient and family education  - Perform skin care/dressing changes every   Outcome: Progressing

## 2023-10-09 NOTE — PLAN OF CARE
Problem: PAIN - ADULT  Goal: Verbalizes/displays adequate comfort level or baseline comfort level  Description: Interventions:  - Encourage patient to monitor pain and request assistance  - Assess pain using appropriate pain scale  - Administer analgesics based on type and severity of pain and evaluate response  - Implement non-pharmacological measures as appropriate and evaluate response  - Consider cultural and social influences on pain and pain management  - Notify physician/advanced practitioner if interventions unsuccessful or patient reports new pain  Outcome: Progressing     Problem: INFECTION - ADULT  Goal: Absence or prevention of progression during hospitalization  Description: INTERVENTIONS:  - Assess and monitor for signs and symptoms of infection  - Monitor lab/diagnostic results  - Monitor all insertion sites, i.e. indwelling lines, tubes, and drains  - Monitor endotracheal if appropriate and nasal secretions for changes in amount and color  - [place] appropriate cooling/warming therapies per order  - Administer medications as ordered  - Instruct and encourage patient and family to use good hand hygiene technique  - Identify and instruct in appropriate isolation precautions for identified infection/condition  Outcome: Progressing     Problem: SKIN/TISSUE INTEGRITY - ADULT  Goal: Skin Integrity remains intact(Skin Breakdown Prevention)  Description: Assess:  -Inspect skin when repositioning, toileting, and assisting with ADLS  -Assess extremities for adequate circulation and sensation     Bed Management:  -Have minimal linens on bed & keep smooth, unwrinkled  -Change linens as needed when moist or perspiring    Toileting:  -Offer bedside commode    Activity:  -Encourage activity and walks on unit  -Encourage or provide ROM exercises   -Use appropriate equipment to lift or move patient in bed    Skin Care:  -Avoid use of baby powder, tape, friction and shearing, hot water or constrictive clothing  -Do not massage red bony areas    Outcome: Progressing  Goal: Incision(s), wounds(s) or drain site(s) healing without S/S of infection  Description: INTERVENTIONS  - Assess and document dressing, incision, wound bed, drain sites and surrounding tissue  - Provide patient and family education  Outcome: Progressing

## 2023-10-09 NOTE — PROGRESS NOTES
Daily Progress Note - [address] JESSIKA Frankel 44 y.o. male MRN: [id number]  Unit/Bed#: [address] [place] [address] A Encounter: [id number]  Admitting Physician: Niki Libman, MD   PCP: Dm Putnam MD  Date of Admission:  10/6/2023  8:56 PM    Assessment and Plan    * Sepsis [facility])  Assessment & Plan  Acute  As evidenced by WBC with bands tachypnea 25 and cellulitis on LLE  Pt reports pain x2 weeks and n/v x 2 days with 1 day/evening of dizziness/confusion  Exam shows as in photographs below a erythematous warm area over much of LLE  Leg XR shows no free are concerning for nec fac and inflamed/tender area does not involve groin (low concern for leonela gangrene)    X-ray Fibula: No acute osseous abnormality.     · Given Zofran, 1 L normal saline, 2 doses of 0.5 Dilaudid, as well as vancomycin and cefepime in ED  · STOP Cefepime & vancomycin [MRSA negative]  · START Ancef   · Blood culture no growth at 24 hours  · Doppler study pending  · CXR pending  · Normal saline 75 cc/hr  · Continue to trend procal  · 7.02 --> 5.83-->2.63        Cellulitis  Assessment & Plan  See plan under sepsis above                  Type 2 diabetes mellitus with diabetic polyneuropathy, without long-term current use of insulin [facility])  Assessment & Plan  Lab Results   Component Value Date    HGBA1C 11.9 (H) 10/07/2023       Recent Labs     10/08/23  1105 10/08/23  1551 10/08/23  2108 10/09/23  0652   POCGLU 154* 146* 159* 131       Blood Sugar Average: Last 72 hrs:  (P) 154       Patient reports that at home he usually sees sugars around 150s but recently his diet has been poor (attributes to soda)    · Basal Insulin 12 unit  · Sliding scale insulin  · Hypoglycemia protocol activated    CLOVIS (acute kidney injury) [facility])  Assessment & Plan    Recent Labs     10/07/23  0447 10/08/23  0535 10/08/23  1435 10/09/23  0504   CREATININE 1.22 2.40* 1.85* 1.33*     Pt's baseline creatnine is 1.2's w/ an increase to  2.4 on 10/9. Pt also had associate hyponatremia and Creatnine clearance of 109.5     UA: Trace proteins, 1-2 hyaline cast, 4-10 RBC, Large occult blood    · Decrease NS  From 100 to 75ml/hr  · Discontinue Amlodipine & lisinopril  · Renal dose Ancef, vancomycin, Lovenox  · Avoid nephrotoxic drugs and hypotension    Long term (current) use of immunomodulator  Assessment & Plan  Patient takes Humira outpatient for Hidradenitis suppurativa  -Follows with wound care outpatient    Lymphedema of both lower extremities  Assessment & Plan  Chronic, stable  Pt has B/L Lymphedema in lower extremities, Left worse than right    · Continue to elevate legs  · Wear compression stockings  · Continue outpatient Lymphedema massages    History of hidradenitis suppurativa  Assessment & Plan  Chronic, stable  Patient has symptoms in the lower extremities.   Denies symptoms in armpits, scalp, neck    · Follow-up with wound care  · Takes Humira outpatient    Morbid obesity with body mass index (BMI) of 50.0 to 59.9 in [facility])  Assessment & Plan  Patient admits to excessive soda consumption and unhealthy eating with his son  · Consult nutrition  · D/C w/sleep study referral    Essential hypertension  Assessment & Plan  Pt has soft blood blood pressure systolic around 32F    · Hold Amlodipine and Lisinopril due to CLOVIS  · Continue Coreg w/hold parameters     Dyslipidemia  Assessment & Plan  Continue patient's home Lipitor and fish oil    Coronary artery disease  Assessment & Plan  Patient has history of catheterization in 2010 and 2020 including stent placement with a lateral catheterization.  - No acute chest pain or discomfort at this time    Acute hypoxic respiratory failure (HCC)-resolved as of 10/8/2023  Assessment & Plan  Tachypneic up to 25 with pulse ox of 94 while on 2 L nasal cannula  -Chest x-ray pending  - Suspect transient discomfort from nausea and pain  - Attempt to wean back to room air  - Supplemental O2 as needed   -Start incentive spirometry      VTE Pharmacologic Prophylaxis: VTE Score: 9 High Risk (Score >/= 5) - Pharmacological DVT Prophylaxis Ordered: enoxaparin (Lovenox). Sequential Compression Devices Ordered. Patient Centered Rounds: I have performed bedside rounds with nursing staff today. Discussions with Specialists or Other Care Team Provider: ROGER    Education and Discussions with Family / Patient:   Patient Information Sharing: With the consent of Robby Angelucci , their loved ones will be notified today by inpatient team of the patient’s condition and current plan. All questions answered. Time Spent for Care: 30 minutes. More than 50% of total time spent on counseling and coordination of care as described above. Current Length of Stay: 3 day(s)    Current Patient Status: Inpatient   Certification Statement: The patient will continue to require additional inpatient hospital stay due to DVT study    Discharge Plan: Home    Code Status: Level 1 - Full Code    Subjective:   Patient seen and examined at bedside. Patient said he slept okay thru the night. Still has 8/10 pain on his LLE. No other acute complaints at this time. Denies shortness of breath, chest pain, nausea, vomiting, diarrhea. Objective     Objective:   Vitals:   Temp (24hrs), Av.1 °F (36.7 °C), Min:97.7 °F (36.5 °C), Max:98.8 °F (37.1 °C)    Temp:  [97.7 °F (36.5 °C)-98.8 °F (37.1 °C)] 97.7 °F (36.5 °C)  HR:  [55-66] 55  Resp:  [18] 18  BP: (109-113)/(56-76) 113/57  SpO2:  [90 %-95 %] 93 %  Body mass index is 58.23 kg/m². Input and Output Summary (last 24 hours): Intake/Output Summary (Last 24 hours) at 10/9/2023 0905  Last data filed at 10/8/2023 1816  Gross per 24 hour   Intake 1516.25 ml   Output 1175 ml   Net 341.25 ml       Physical Exam:   Physical Exam  Constitutional:       General: He is not in acute distress. Appearance: He is obese. He is not ill-appearing, toxic-appearing or diaphoretic. Comments: Morbidly obese   Cardiovascular:      Rate and Rhythm: Normal rate and regular rhythm. Pulses: Normal pulses. Heart sounds: Normal heart sounds. Pulmonary:      Effort: Pulmonary effort is normal.      Breath sounds: Normal breath sounds. Abdominal:      General: There is no distension. Palpations: Abdomen is soft. Musculoskeletal:         General: Swelling (LLE) and tenderness (LLE) present. Right lower leg: No edema. Left lower leg: Edema present. Skin:     General: Skin is warm. Capillary Refill: Capillary refill takes less than 2 seconds. Neurological:      General: No focal deficit present. Mental Status: He is alert and oriented to person, place, and time. Mental status is at baseline. Psychiatric:         Mood and Affect: Mood normal.         Behavior: Behavior normal.         Thought Content: Thought content normal.         Judgment: Judgment normal.           Additional Data:     Labs:  Results from last 7 days   Lab Units 10/09/23  0504   WBC Thousand/uL 6.92   HEMOGLOBIN g/dL 11.6*   HEMATOCRIT % 37.0   PLATELETS Thousands/uL 300   NEUTROS PCT % 63   LYMPHS PCT % 24   MONOS PCT % 9   EOS PCT % 3     Results from last 7 days   Lab Units 10/09/23  0504 10/08/23  1435 10/08/23  0535   POTASSIUM mmol/L 3.7   < > 3.3*   CHLORIDE mmol/L 98   < > 93*   CO2 mmol/L 28   < > 27   BUN mg/dL 32*   < > 40*   CREATININE mg/dL 1.33*   < > 2.40*   CALCIUM mg/dL 7.7*   < > 7.5*   ALK PHOS U/L  --   --  68   ALT U/L  --   --  9   AST U/L  --   --  16    < > = values in this interval not displayed. Results from last 7 days   Lab Units 10/09/23  0652 10/08/23  2108 10/08/23  1551 10/08/23  1105 10/08/23  0706   POC GLUCOSE mg/dl 131 159* 146* 154* 114     Results from last 7 days   Lab Units 10/07/23  0447   HEMOGLOBIN A1C % 11.9*       * I Have Reviewed All Lab Data Listed Above. * Additional Pertinent Lab Tests Reviewed:  [address] [place] [address] Admission Reviewed    Imaging:    Imaging Reports Reviewed Today Include: yes  Imaging Personally Reviewed by Myself Includes:  yes    Recent Cultures (last 7 days):     Results from last 7 days   Lab Units 10/06/23  2143 10/06/23  2141   BLOOD CULTURE  No Growth at 24 hrs. No Growth at 24 hrs. Last 24 Hours Medication List:   Current Facility-Administered Medications   Medication Dose Route Frequency Provider Last Rate   • acetaminophen  650 mg Oral Q8H Hermila Panchal MD     • acetaminophen  650 mg Oral Q4H PRN Johann Felipe MD     • aspirin  81 mg Oral Daily Jennie Rashid DO     • atorvastatin  80 mg Oral Daily [place], DO     • carvedilol  6.25 mg Oral BID With Meals [facility], DO     • cefazolin  2,000 mg Intravenous Q8H Wolf Rios DO 2,000 mg (10/09/23 0147)   • enoxaparin  60 mg Subcutaneous BID Jennie Rashid DO     • fish oil  1,000 mg Oral BID Jennie Rashid DO     • HYDROmorphone  1 mg Intravenous Q4H PRN [facility], DO     • insulin glargine  12 Units Subcutaneous HS Wolf Rios DO     • insulin lispro  2-12 Units Subcutaneous TID [facility], DO     • ondansetron  4 mg Intravenous Q6H PRN Jennie Rashid DO     • oxyCODONE  5 mg Oral Q4H PRN [facility], DO     • sodium chloride  75 mL/hr Intravenous Continuous Fifi Rhodes  mL/hr (10/09/23 0149)             ** Please Note: Dictation voice to text software may have been used in the creation of this document.  Johann Felipe MD  10/09/23  9:05 AM

## 2023-10-09 NOTE — WOUND OSTOMY CARE
Progress Note - Wound   Jam Serrano 44 y.o. male MRN: [id number]  Unit/Bed#: [address] Encounter: [id number]      Assessment: This is a 44year old male patient admitted on 10/6/23 with sepsis. He has a history of uncontrolled DM 2, CAD, lower extremity lymphedema and hidradenitis suppurativa. The patient was AAO x 3 and stated that he is upset due to swelling in LLE despite elevation. The patient performed the wound care dressing changes independently this am and stated, "I don't want you to take the dressings off because I don't want to waste them". He brought dressings in from home (Polymem Max Ag) and it was explained to him that this particular dressing is not available in this hospital. He refused to have wound care visit done unless he could be seen by Dr Christiano Galarza from the wound center and Dr Marimar Ochoa was notified - this Renny Kid is well known to this patient. Orders written based on photos taken on admission. Plan:   Skin care plans:  1-Cleanse wounds to bilateral thighs and abdomen with NSS & pat dry. Apply Maxorb Ag+ silver alginate to wounds cut to size. Cover with ABD pad and paper tape. Change daily & prn soilage/dislodgement. 2-Hydraguard to bilateral sacrum, buttock and heels BID and PRN  3-Float heels on 2 pillows to offload pressure so heels are not in contact with mattress or pillows. 4-Ehob pressure redistribution cushion in chair when out of bed. Limit sitting for 1-2 hours at a time. Elevate legs to heart level or higher whether OOB to chair or in bed. 5-Moisturize skin daily with skin nourishing cream.  6-Turn/reposition q2h or when medically stable for pressure re-distribution on skin. Please call or tiger text with questions and concerns. Discussed assessment findings, and plan of care/recommendations with Dr Marimar Ochoa and Zain Best RN. Recommendations written as orders.   El BOWENS, RN, Lobo Tripathi

## 2023-10-09 NOTE — ASSESSMENT & PLAN NOTE
Chronic, stable  Pt has B/L Lymphedema in lower extremities, Left worse than right    · Continue to elevate legs  · Wear compression stockings  · Continue outpatient Lymphedema massages

## 2023-10-09 NOTE — NURSING NOTE
Refuses wound care from writer and from Shyam José RN. Pt states he will continue to do his own wound care with his own supplies.

## 2023-10-09 NOTE — UTILIZATION REVIEW
Continued Stay Review    Date: 10/8 & 10/9                       Current Patient Class: inpatient  Current Level of Care: med surg  HPI:43 y.o. male initially admitted on 10/6/23     Assessment/Plan:   10/8/23:  Leukocytosis improved. Hemoglobin slightly lower at 10.9. Creatinine worsened to 2.4, IVF maintained. Mild hyponatremia hypokalemia and hypomagnesemia. Continue IVABT & wound care for LLE cellulitis. Using IV Dilaudid & Oxycodone for pain control. 10/9/23:  Persistent LLE edema & erythema. Continues to require IV Dilaudid & oxycodone. IVABT continued for cellulitis. Venous duplex study ordered. IVF maintained. Vital Signs:   Date/Time Temp Pulse Resp BP MAP (mmHg) SpO2 Calculated FIO2 (%) - Nasal Cannula Nasal Cannula O2 Flow Rate (L/min) O2 Device Patient Position - Orthostatic VS   10/09/23 0715 -- -- -- -- -- 93 % -- -- None (Room air) --   10/09/23 0700 97.7 °F (36.5 °C) 55 18 113/57 -- -- -- -- -- Lying   10/09/23 06:54:17 97.7 °F (36.5 °C) 63 18 113/57 76 90 % -- -- None (Room air) Lying   10/08/23 22:43:03 98.8 °F (37.1 °C) 62 18 109/58 75 91 % -- -- -- --   10/08/23 19:24:14 98.2 °F (36.8 °C) 60 18 112/56 75 90 % -- -- None (Room air) Lying   10/08/23 16:07:23 -- 66 -- 112/76 88 93 % -- -- -- --   10/08/23 0908 -- -- -- -- -- 95 % -- -- None (Room air) --   10/08/23 08:01:41 98.7 °F (37.1 °C) 72 16 96/59 71 88 % Abnormal  -- -- None (Room air) Sitting   10/08/23 00:15:12 -- 63 -- -- -- 94 % -- -- -- --       Pertinent Labs/Diagnostic Results:   10/9 VAS lower limb venous duplex study, unilateral/limited  Impression:  RIGHT LOWER LIMB LIMITED:  Evaluation shows no evidence of thrombus in the common femoral vein. Doppler evaluation shows a normal response to augmentation maneuvers. LEFT LOWER LIMB:  No evidence of acute or chronic deep vein thrombosis  No evidence of superficial thrombophlebitis noted. Doppler evaluation shows a normal response to augmentation maneuvers.   Popliteal, posterior tibial and anterior tibial arterial Doppler waveform's are triphasic. Tech Note:  There is an echogenic structure located in the bilateral inguinal regions suggestive of enlarged lymphatic channels. Technically difficult/limited study.  Some segments may be poorly visualized on today's exam.    Results from last 7 days   Lab Units 10/06/23  2140   SARS-COV-2  Negative     Results from last 7 days   Lab Units 10/09/23  0504 10/08/23  0535 10/07/23  0447 10/06/23  2141   WBC Thousand/uL 6.92 7.29 11.83* 16.18*   HEMOGLOBIN g/dL 11.6* 10.9* 12.4 13.6   HEMATOCRIT % 37.0 34.5* 37.9 40.5   PLATELETS Thousands/uL 300 271 250 306   NEUTROS ABS Thousands/µL 4.40 4.99  --   --    BANDS PCT %  --   --   --  13*     Results from last 7 days   Lab Units 10/09/23  0504 10/08/23  1435 10/08/23  0535 10/07/23  0447 10/06/23  2141   SODIUM mmol/L 133* 130* 128* 132* 130*   POTASSIUM mmol/L 3.7 3.7 3.3* 3.6 4.9   CHLORIDE mmol/L 98 95* 93* 94* 92*   CO2 mmol/L 28 27 27 28 27   ANION GAP mmol/L 7 8 8 10 11   BUN mg/dL 32* 37* 40* 23 20   CREATININE mg/dL 1.33* 1.85* 2.40* 1.22 1.21   EGFR ml/min/1.73sq m 65 43 32 72 73   CALCIUM mg/dL 7.7* 7.8* 7.5* 7.8* 8.3*   MAGNESIUM mg/dL 2.3  --  1.7*  --   --      Results from last 7 days   Lab Units 10/08/23  0535 10/07/23  0447 10/06/23  2141   AST U/L 16 13 36   ALT U/L 9 10 11   ALK PHOS U/L 68 69 79   TOTAL PROTEIN g/dL 6.4 6.8 7.7   ALBUMIN g/dL 2.8* 3.0* 3.3*   TOTAL BILIRUBIN mg/dL 0.37 0.48 0.65     Results from last 7 days   Lab Units 10/09/23  1043 10/09/23  0652 10/08/23  2108 10/08/23  1551 10/08/23  1105 10/08/23  0706 10/07/23  2154 10/07/23  1605 10/07/23  1101 10/07/23  0722 10/06/23  2104   POC GLUCOSE mg/dl 176* 131 159* 146* 154* 114 145* 200* 181* 155* 155*     Results from last 7 days   Lab Units 10/09/23  0504 10/08/23  1435 10/08/23  0535 10/07/23  0447 10/06/23  2141   GLUCOSE RANDOM mg/dL 120 158* 122 149* 140     Results from last 7 days   Lab Units 10/07/23  0447   HEMOGLOBIN A1C % 11.9*   EAG mg/dl 295     Results from last 7 days   Lab Units 10/07/23  0447 10/07/23  0156 10/06/23  2301   HS TNI 0HR ng/L  --   --  13   HS TNI 2HR ng/L  --  12  --    HSTNI D2 ng/L  --  -1  --    HS TNI 4HR ng/L 10  --   --    HSTNI D4 ng/L -3  --   --      Results from last 7 days   Lab Units 10/09/23  0504 10/07/23  0447 10/06/23  2141   PROCALCITONIN ng/ml 2.69* 5.83* 7.02*     Results from last 7 days   Lab Units 10/06/23  2141   LACTIC ACID mmol/L 1.1     Results from last 7 days   Lab Units 10/08/23  1255 10/07/23  1013   CLARITY UA  Clear Clear   COLOR UA  Light Yellow Anais   SPEC GRAV UA  1.010 1.025   PH UA  5.5 5.0   GLUCOSE UA mg/dl Negative Negative   KETONES UA mg/dl Negative Trace*   BLOOD UA  Large* Large*   PROTEIN UA mg/dl Trace* 100 (2+)*   NITRITE UA  Negative Negative   BILIRUBIN UA  Negative Small*   UROBILINOGEN UA E.U./dl 0.2 1.0   LEUKOCYTES UA  Negative Negative   WBC UA /hpf 2-4 1-2   RBC UA /hpf 4-10* 10-20*   BACTERIA UA /hpf Occasional Occasional   EPITHELIAL CELLS WET PREP /hpf Occasional None Seen     Results from last 7 days   Lab Units 10/06/23  2140   INFLUENZA A PCR  Negative   INFLUENZA B PCR  Negative   RSV PCR  Negative     Results from last 7 days   Lab Units 10/06/23  2143 10/06/23  2141   BLOOD CULTURE  No Growth at 24 hrs. No Growth at 24 hrs.      Results from last 7 days   Lab Units 10/09/23  0504 10/08/23  0535   VANCOMYCIN RM ug/mL 7.7* 19.3       Medications:   Scheduled Medications:  Medications 09/30 10/01 10/02 10/03 10/04 10/05 10/06 10/07 10/08 10/09   acetaminophen (TYLENOL) tablet 650 mg  Dose: 650 mg  Freq: Every 8 hours scheduled Route: PO  Start: 10/07/23 0600           0500     1421     2130      0606     1431     2109      0505     1400     2200        amLODIPine (NORVASC) tablet 10 mg  Dose: 10 mg  Freq: Daily Route: PO  Start: 10/08/23 0900 End: 10/08/23 0933   Admin Instructions:   LOOK ALIKE SOUND ALIKE MED   Order specific questions:   Hold for systolic blood pressure less than (mmHg) 130            (0806)     0933-D/C'd       amLODIPine (NORVASC) tablet 10 mg  Dose: 10 mg  Freq: Daily Route: PO  Start: 10/07/23 0900 End: 10/07/23 9927   Admin Instructions:   LOOK ALIKE SOUND ALIKE MED   Order specific questions:   Hold for systolic blood pressure less than (mmHg) 110           0802     0923-D/C'd        aspirin chewable tablet 81 mg  Dose: 81 mg  Freq: Daily Route: PO  Start: 10/07/23 0900           0802      0821      0807        atorvastatin (LIPITOR) tablet 80 mg  Dose: 80 mg  Freq: Daily Route: PO  Start: 10/07/23 0900           0803      0821      0807        carvedilol (COREG) tablet 6.25 mg  Dose: 6.25 mg  Freq: 2 times daily with meals Route: PO  Start: 10/07/23 0730   Admin Instructions:   Hold for heart rate less than 50 beats per minute. Look alike sound alike. Order specific questions:   Hold for systolic blood pressure less than (mmHg) 110           0803     (1539)      (0807) [C]     1652      0807     1630        ceFAZolin (ANCEF) IVPB (premix in dextrose) 1,000 mg 50 mL  Dose: 1,000 mg  Freq: Every 12 hours Route: IV  Start: 10/07/23 0800 End: 10/07/23 0254   Admin Instructions:   Look alike sound alike. Order specific questions:   Indication: skin and soft tissue infection           0254-D/C'd        ceFAZolin (ANCEF) IVPB (premix in dextrose) 2,000 mg 50 mL  Dose: 2,000 mg  Freq: Every 8 hours Route: IV  Last Dose: 2,000 mg (10/09/23 1100)  Start: 10/07/23 1000   Admin Instructions:   Look alike sound alike.    Order specific questions:   Indication: skin and soft tissue infection           1039     1117     1719      0201     0245     0915     1008     1727     1800      0147     1100     1800        cefepime (MAXIPIME) 2 g/50 mL dextrose IVPB  Dose: 2 g  Freq: Every 8 hours Route: IV  Last Dose: Stopped (10/07/23 0756)  Start: 10/07/23 0700 End: 10/07/23 2064   Admin Instructions:   Refrigerate Order specific questions:   Indication: skin and soft tissue infection           0644     0756     0923-D/C'd        cefepime (MAXIPIME) IVPB (premix in dextrose) 2,000 mg 50 mL  Dose: 2,000 mg  Freq: Every 8 hours Route: IV  Start: 10/07/23 0630 End: 10/07/23 0622   Order specific questions:   Indication: skin and soft tissue infection           0622-D/C'd        cefepime (MAXIPIME) IVPB (premix in dextrose) 2,000 mg 50 mL  Dose: 2,000 mg  Freq: Every 12 hours Route: IV  Start: 10/07/23 0800 End: 10/07/23 0323   Order specific questions:   Indication: skin and soft tissue infection           0323-D/C'd        cefepime (MAXIPIME) IVPB (premix in dextrose) 2,000 mg 50 mL  Dose: 2,000 mg  Freq: Once Route: IV  Last Dose: Stopped (10/06/23 2258)  Start: 10/06/23 2230 End: 10/06/23 2258   Order specific questions:   Indication: skin and soft tissue infection          2228 2258           enoxaparin (LOVENOX) subcutaneous injection 60 mg  Dose: 60 mg  Freq: 2 times daily Route: SC  Start: 10/07/23 0900   Admin Instructions:   High Alert Medication, Check for allergies to pork or pork derivatives/dietary restrictions before administration. LOOK ALIKE SOUND ALIKE MED           3147     2130      8160     2110      5200     2100        fish oil capsule 1,000 mg  Dose: 1,000 mg  Freq: 2 times daily Route: PO  Start: 10/07/23 0900   Admin Instructions:   Swallow whole; do not break, crush, dissolve, or chew.           0804     1710      0822     1707      0807     1800        HYDROmorphone (DILAUDID) injection 0.5 mg  Dose: 0.5 mg  Freq: Once Route: IV  Start: 10/07/23 0000 End: 10/07/23 0020   Admin Instructions:   High alert medication. LOOK ALIKE SOUND ALIKE MED           0020          HYDROmorphone (DILAUDID) injection 0.5 mg  Dose: 0.5 mg  Freq: Once Route: IV  Start: 10/06/23 2230 End: 10/06/23 2227   Admin Instructions:   High alert medication.  LOOK ALIKE SOUND ALIKE MED          9567           insulin glargine (LANTUS) subcutaneous injection 12 Units 0.12 mL  Dose: 12 Units  Freq: Daily at bedtime Route: SC  Start: 10/07/23 2200   Admin Instructions:   HIGH ALERT MEDICATION. Do not dilute/mix insulin glargine with any other insulin formulation/solution. **DISPOSE IN 8 GALLON BLACK CONTAINER** Do not hold medication without a physician order. 2305      2105      2205         insulin lispro (HumaLOG) 100 units/mL subcutaneous injection 2-12 Units  Dose: 2-12 Units  Freq: 3 times daily before meals Route: SC  Start: 10/07/23 0700   Admin Instructions:   Algorithm 4    Blood Glucose 150 - 199: 2 Unit of Insulin  Blood Glucose 200 - 249: 4 Units of Insulin  Blood Glucose 250 - 299: 6 Units of Insulin  Blood Glucose 300 - 349: 8 Units of Insulin  Blood Glucose 350 - 399: 10 Units of Inuslin  Blood Glucose greater than or equal to 400: 12 Units of Insulin  HIGH ALERT MEDICATION. **DISPOSE IN 8 GALLON BLACK CONTAINER**. LOOK ALIKE SOUND ALIKE MED           08     0301     9809      5322) [C]     1130     (8185) [C]      (9497)     1135     1600        lisinopril (ZESTRIL) tablet 20 mg  Dose: 20 mg  Freq: Daily Route: PO  Start: 10/08/23 0900 End: 10/08/23 0933   Admin Instructions:   LOOK ALIKE SOUND ALIKE MED   Order specific questions:   Hold for systolic blood pressure less than (mmHg) 130            (6608)     0933-D/C'd       lisinopril (ZESTRIL) tablet 20 mg  Dose: 20 mg  Freq: Daily Route: PO  Start: 10/07/23 0900 End: 10/07/23 0086   Admin Instructions:   LOOK ALIKE SOUND ALIKE MED   Order specific questions:   Hold for systolic blood pressure less than (mmHg) 110           0804     0923-D/C'd        magnesium Oxide (MAG-OX) tablet 800 mg  Dose: 800 mg  Freq: 2 times daily Route: PO  Start: 10/08/23 0900 End: 10/08/23 1707            0824     1707         ondansetron (ZOFRAN) injection 4 mg  Dose: 4 mg  Freq: Once Route: IV  Start: 10/07/23 0000 End: 10/07/23 0024   Admin Instructions:   Push over 2 minutes. 0024          ondansetron (ZOFRAN) injection 4 mg  Dose: 4 mg  Freq: Once Route: IV  Start: 10/06/23 2145 End: 10/06/23 2144   Admin Instructions:   Push over 2 minutes. 2144           potassium chloride (K-DUR,KLOR-CON) CR tablet 20 mEq  Dose: 20 mEq  Freq: Once Route: PO  Start: 10/08/23 0745 End: 10/08/23 0823   Admin Instructions:   Swallow whole; do not crush or chew. Tablet may be split in half to facilitate swallowing. 1019         potassium chloride (K-DUR,KLOR-CON) CR tablet 30 mEq  Dose: 30 mEq  Freq: Once Route: PO  Start: 10/09/23 0630 End: 10/09/23 0711   Admin Instructions:   Swallow whole; do not crush or chew. Tablet may be split in half to facilitate swallowing.             0711         potassium chloride 20 mEq IVPB (premix)  Dose: 20 mEq  Freq: Once Route: IV  Last Dose: Stopped (10/08/23 1028)  Start: 10/08/23 0830 End: 10/08/23 1028   Admin Instructions: Total dose = 40 meq  Patient must be on telemetry if rate is greater than 10 meq/hour. Peripheral administration not to exceed 20 meq/hour. High alert medication            0825     1028         Followed by  potassium chloride 20 mEq IVPB (premix)  Dose: 20 mEq  Freq: Once Route: IV  Start: 10/08/23 1030 End: 10/08/23 0921   Admin Instructions: Total dose = 40 meq  Patient must be on telemetry if rate is greater than 10 meq/hour. Peripheral administration not to exceed 20 meq/hour. High alert medication            0921-D/C'd       potassium chloride 40 mEq IVPB (premix)  Dose: 40 mEq  Freq: Once Route: IV  Start: 10/08/23 0745 End: 10/08/23 0744   Admin Instructions:   Patient must be on telemetry if rate is greater than 10 meq/hour. Must be administered via central line. High alert medication            0744-D/C'd       sodium chloride 0.9 % bolus 1,000 mL  Dose: 1,000 mL  Freq:  Once Route: IV  Last Dose: Stopped (10/07/23 0021)  Start: 10/06/23 2200 End: 10/07/23 0021          2146      0021          traMADol (ULTRAM) tablet 50 mg  Dose: 50 mg  Freq: Once Route: PO  Start: 10/08/23 1245 End: 10/08/23 1250   Admin Instructions:   High Alert Medication. LOOK ALIKE SOUND ALIKE MED            1250         vancomycin (VANCOCIN) 1500 mg in sodium chloride 0.9% 250 mL IVPB  Dose: 15 mg/kg  Weight Dosing Info: 107 kg (Adjusted)  Freq: Once Route: IV  Last Dose: Stopped (10/07/23 0156)  Start: 10/06/23 2300 End: 10/07/23 0156   Admin Instructions: Fastest infusion time = 30min for every 500mg administered; decrease rate if Red Man Syndrome occurs   Order specific questions:   Indication: methicillin-resistant staphylococcus aureus          2303      0156 [C]          vancomycin (VANCOCIN) IVPB (premix in dextrose) 1,000 mg 200 mL  Dose: 10 mg/kg  Weight Dosing Info: 107 kg (Adjusted)  Freq: Every 12 hours Route: IV  Last Dose: Stopped (10/09/23 1015)  Start: 10/09/23 0830 End: 10/09/23 0859   Admin Instructions: Fastest infusion time = 30min for every 500mg administered; decrease rate if Red Man Syndrome occurs  Refrigerate   Order specific questions:   Indication: skin and soft tissue infection             0841     0859-D/C'd  1015        vancomycin (VANCOCIN) IVPB (premix in dextrose) 1,000 mg 200 mL  Dose: 15 mg/kg  Weight Dosing Info: 66.1 kg (Ideal)  Freq: Every 12 hours Route: IV  Last Dose: Stopped (10/08/23 0100)  Start: 10/07/23 1100 End: 10/08/23 0731   Admin Instructions:    Fastest infusion time = 30min for every 500mg administered; decrease rate if Red Man Syndrome occurs  Refrigerate   Order specific questions:   Indication: skin and soft tissue infection           1053     1225     2302      0100     0731-D/C'd       Medications 09/30 10/01 10/02 10/03 10/04 10/05 10/06 10/07 10/08 10/09         Continuous Meds Sorted by Name  for Radha Voss as of 10/09/23 1126  Legend:                          Inactive     Active     Other Encounter     Linked                 Medications 09/30 10/01 10/02 10/03 10/04 10/05 10/06 10/07 10/08 10/09   sodium chloride 0.9 % infusion  Rate: 75 mL/hr Dose: 75 mL/hr  Freq: Continuous Route: IV  Last Dose: 75 mL/hr (10/09/23 0930)  Start: 10/08/23 0930            1009     1231     1400     1733     1809      0149     0930              PRN Meds Sorted by Name  for Ashlee Gloria as of 10/09/23 1126  Legend:                          Inactive     Active     Other Encounter     Linked                 Medications 09/30 10/01 10/02 10/03 10/04 10/05 10/06 10/07 10/08 10/09   acetaminophen (TYLENOL) tablet 650 mg  Dose: 650 mg  Freq: Every 4 hours PRN Route: PO  PRN Reason: mild pain  Start: 10/08/23 0741            (1958)         HYDROmorphone (DILAUDID) injection 1 mg  Dose: 1 mg  Freq: Every 4 hours PRN Route: IV  PRN Reason: breakthrough pain  Start: 10/07/23 0131   Admin Instructions:   High alert medication. LOOK ALIKE SOUND ALIKE MED           9341     0800     1220     1719     2150      0914     1351     1734     2219      0807        ibuprofen (MOTRIN) tablet 400 mg  Dose: 400 mg  Freq: Every 6 hours PRN Route: PO  PRN Reason: mild pain  Start: 10/07/23 0129 End: 10/08/23 0733   Admin Instructions:   LOOK ALIKE SOUND ALIKE MED            0733-D/C'd       ondansetron (ZOFRAN) injection 4 mg  Dose: 4 mg  Freq: Every 6 hours PRN Route: IV  PRN Reason: nausea  Start: 10/07/23 0146   Admin Instructions:   Push over 2 minutes. oxyCODONE (ROXICODONE) IR tablet 5 mg  Dose: 5 mg  Freq: Every 4 hours PRN Route: PO  PRN Reason: severe pain  Start: 10/07/23 0129   Admin Instructions:   High alert medication.  LOOK ALIKE SOUND ALIKE MED           0201     8137     4901     6530     3734      3543     2801     3310     2106      0457        vancomycin (VANCOCIN) IVPB (premix in dextrose) 1,000 mg 200 mL  Dose: 10 mg/kg  Weight Dosing Info: 107 kg (Adjusted)  Freq: Daily PRN Route: IV  PRN Comment: when trough is </= 15  Start: 10/09/23 1000 End: 10/09/23 0829   Admin Instructions: Fastest infusion time = 30min for every 500mg administered; decrease rate if Red Man Syndrome occurs  Refrigerate   Order specific questions:   Indication: skin and soft tissue infection             0829-D/C'd          Discharge Plan: d    Network Utilization Review Department  ATTENTION: Please call with any questions or concerns to [phone] and carefully listen to the prompts so that you are directed to the right person. All voicemails are confidential.   For Discharge needs, contact Care Management DC Support Team at [phone] opt. 2  Send all requests for admission clinical reviews, approved or denied determinations and any other requests to dedicated fax number below belonging to the campus where the patient is receiving treatment.  List of dedicated fax numbers for the Facilities:  [place] DENIALS (Administrative/Medical Necessity) [phone]   DISCHARGE SUPPORT TEAM (NETWORK) [id number] (Maternity/NICU/Pediatrics) [phone]   [address] [facility] [address] 1521 [facility] 1000 [facility] [phone]   [address] [place] [address] 207 [facility] [address] [place] [address] [address] [address] [facility] [address] [facility]  [facility] [phone]

## 2023-10-09 NOTE — DISCHARGE SUMMARY
Discharge Summary - [address] [place] [address] Medicine Residency     Patient Information: Arian Rubio 44 y.o. male MRN: [id number]  Unit/Bed#: 2 1409 [facility] ANISHA Encounter: [id number]     Admitting Physician: Robin Mock MD  Discharging Physician/Practitioner: Jean Pierre Waller MD   PCP: Concepción Cohen MD  Admission Date:   Admission Orders (From admission, onward)       Ordered        10/06/23 2359  INPATIENT ADMISSION  Once                          Discharge Date: 10/09/23      Reason for Admission: Cellulitis [L03.90]  Diabetes ([address]) [E11.9]  Nausea & vomiting [R11.2]  Coronary artery disease involving native coronary artery of native heart without angina pectoris [I25.10]  Type 2 diabetes mellitus with diabetic polyneuropathy, without long-term current use of insulin ([address]) [E11.42]     Discharge Diagnoses:      Principal Problem:    Sepsis ([address])  Active Problems:    Coronary artery disease    Dyslipidemia    Essential hypertension    Type 2 diabetes mellitus with diabetic polyneuropathy, without long-term current use of insulin ([address])    CLOVIS (acute kidney injury) ([address])    Morbid obesity with body mass index (BMI) of 50.0 to 59.9 in adult [facility])    History of hidradenitis suppurativa    Lymphedema of both lower extremities    Cellulitis  Resolved Problems:    Acute hypoxic respiratory failure ([address])    Consultations During Hospital Stay:    ·       None     Procedures Performed:     ·       None     Significant Findings / Test Results:     10/9: WBC 6.92, K 3.7, Mg 2.3, Cr 1.85>1.33  10/7: WBC 7.29, K 3.3, Mg 1.7, Cr 2.45>1.85  10/7: troponin WNL, A1c 11.9, na 132, procal 5.83  10/6: Troponin negative, WBC 16.18 with bands, Pro-Markie 7.02, , K4.9, CR 1.21    X-ray tibia-fibula: No acute osseous abnormality  VAS Doppler: No evidence of DVT B/L  UA: Trace proteins, 1-2 hyaline cast, 4-10 RBC, Large occult blood     Incidental Findings:   ·       None      Test Results Pending at Discharge (will require follow up): ·       None     Outpatient Tests Requested:  ·       Sleep study     Outpatient follow-up Requested:    ·       Follow up with wound care and sleep study    Complications:  None    Things to address at first visit after hospitalization     Did the patient follow up with sleep study on discharge? Did the patient follow up with wound care after discharge? Is the patient continuing on home HTN medications? Does the patient have any leg pain or lower extremity swelling? The patient complete his 2-day course of doxycycline for cellulitis? Has the patient been taking zinc for wound healing? Has the patient follow-up with the lymphedema clinic and vascular? Has the patient followed up with sleep medicine? Hospital Course:      Lynn Tubbs is a 44 y.o. male patient history of morbid obesity, lower extremity lymphedema, hidradenitis suppurativa, uncontrolled diabetes mellitus type 2, CAD who originally presented to the hospital on 10/6/2023 due to increasing left lower extremity pain erythema associated with malaise, nausea, vomiting and diaphoresis. Patient denies any chest pain, shortness of breath. Patient denies any fever but reported feeling dizzy and lightheaded as he was not able to eat for 2 days due to his symptoms. He was given Zofran, 1 L normal saline, 2 doses of 0.5 Dilaudid, as well as vancomycin and cefepime in ED. Given history of repeated infections, immunocompromise with Humira as well as diabetes concern for MRSA is not in significant; he was continued on ABX as started with plan to de-escalate pending cultures, if significant improvement may also be able to discharge on oral doxycycline. He was started on IV Cefazolin and d/c vancomycin. Patient had CLOVIS and his home HTN medications Amlodipine and Lisinopril were discontinued in the hospital. Patient to continue wound care and leg elevation on discharge.  Patient to follow up with wound care and sleep study after discharge. Sepsis ([address]) - Resolved  Assessment & Plan    Acute    As evidenced by WBC with bands tachypnea 25 and cellulitis on LLE  Pt reports pain x2 weeks and n/v x 2 days with 1 day/evening of dizziness/confusion  Exam shows as in photographs below a erythematous warm area over much of LLE  Leg XR shows no free are concerning for nec fac and inflamed/tender area does not involve groin (low concern for leonela gangrene)    X-ray Fibula: No acute osseous abnormality. DVT study: No acute or chronic DVTs    Given Zofran, 1 L normal saline, 2 doses of 0.5 Dilaudid, as well as vancomycin and cefepime in ED  STOP Cefepime & vancomycin [MRSA negative]  STARTED Ancef 2 g  Blood culture - no growth  Continue to trend procal  7.02 --> 5.83-->2.63    Cellulitis  Assessment & Plan    See plan under sepsis above    Patient treated with 5 days of cefazolin. MRSA negative vancomycin discontinued. Complete 10-day course of doxycycline 100 mg, p.o. every 6 hours  Start zinc supplementation for help with wound healing  Follow-up with lymphedema clinic and vascular  Monitor for worsening progression of the cellulitis  Monitor for signs of sepsis    Type 2 diabetes mellitus with diabetic polyneuropathy, without long-term current use of insulin ([facility])  Assessment & Plan    Lab Results   Component Value Date    HGBA1C 11.9 (H) 10/07/2023     Blood Sugar Average: Last 72 hrs:  (P) 154     Uncontrolled, blood sugar stable in the 150s. Patient reports that at home he usually sees sugars around 150s but recently his diet has been poor (attributes to soda). Blood sugars stable throughout hospitalization on insulin. Continue home diabetic medications  Carb controlled diet  Follow-up with PCP for further management    History of hidradenitis suppurativa  Assessment & Plan    Chronic, stable    Patient has symptoms in the lower extremities. Denies symptoms in armpits, scalp, neck.      Continue follow-up with wound care  Takes Humira outpatient    Morbid obesity with body mass index (BMI) of 50.0 to 59.9 in adult [facility])  Assessment & Plan    Patient admits to excessive soda consumption and unhealthy eating with his son. Consult nutrition  D/C w/sleep study referral    Long term (current) use of immunomodulator  Assessment & Plan    Patient takes [place] outpatient for Hidradenitis suppurativa    -Follows with wound care outpatient    Lymphedema of both lower extremities  Assessment & Plan  Chronic, stable  Pt has B/L Lymphedema in lower extremities, Left worse than right    Continue to elevate legs  Wear compression stockings  Continue outpatient Lymphedema massages    CLOVIS (acute kidney injury) [facility])  Assessment & Plan    Recent Labs     10/07/23  0447 10/08/23  0535 10/08/23  1435 10/09/23  0504   CREATININE 1.22 2.40* 1.85* 1.33*     Pt's baseline creatnine is 1.2's w/ an increase to  2.4 on 10/9.  Pt also had associate hyponatremia and Creatnine clearance of 109.5     UA: Trace proteins, 1-2 hyaline cast, 4-10 RBC, Large occult blood    Restart amlodipine & lisinopril  Renal dosed Ancef, vancomycin, Lovenox  Avoid nephrotoxic drugs and hypotension    Essential hypertension  Assessment & Plan  Pt has soft blood blood pressure systolic around 22P    Restart Amlodipine and Lisinopril due to CLOVIS  Continue Coreg w/hold parameters     Dyslipidemia  Assessment & Plan  Continue patient's home Lipitor and fish oil    Coronary artery disease  Assessment & Plan  Patient has history of catheterization in 2010 and 2020 including stent placement with a lateral catheterization.  - No acute chest pain or discomfort at this time    Condition at Discharge: Stable      Discharge Day Visit / Exam:      Vitals: Blood Pressure: 111/59 (10/09/23 1520)  Pulse: 67 (10/09/23 1520)  Temperature: 97.8 °F (36.6 °C) (10/09/23 1520)  Temp Source: Oral (10/09/23 1520)  Respirations: 18 (10/09/23 1520)  Height: 5' 7" (170.2 cm) (10/07/23 0124)  Weight - Scale: (!) 169 kg (371 lb 12.8 oz) (10/06/23 2107)  SpO2: 94 % (10/09/23 1520)    Physical Exam  Vitals and nursing note reviewed. Constitutional:       General: He is not in acute distress. Appearance: He is not ill-appearing or toxic-appearing. HENT:      Head: Normocephalic and atraumatic. Right Ear: External ear normal.      Left Ear: External ear normal.      Nose: Nose normal.      Mouth/Throat:      Mouth: Mucous membranes are moist.   Eyes:      General: No scleral icterus. Conjunctiva/sclera: Conjunctivae normal.   Cardiovascular:      Rate and Rhythm: Normal rate and regular rhythm. Pulses: Normal pulses. Heart sounds: Normal heart sounds. Pulmonary:      Effort: Pulmonary effort is normal.      Breath sounds: Normal breath sounds. Abdominal:      General: Abdomen is flat. Bowel sounds are normal.      Palpations: Abdomen is soft. Tenderness: There is no abdominal tenderness. Musculoskeletal:         General: Swelling present. Normal range of motion. Cervical back: Normal range of motion. Right lower leg: No edema. Left lower leg: Edema present. Comments: Lymphedema of the left lower extremity with associated cellulitis of the leg, improving. Patient still has erythema and tenderness mostly localized to the posterior aspect of the LLE there with regression. No blisters or fluctuations. Skin:     General: Skin is warm and dry. Capillary Refill: Capillary refill takes less than 2 seconds. Findings: Erythema present. Comments: Chronic wounds of the testicles   Neurological:      Mental Status: He is alert and oriented to person, place, and time. Psychiatric:         Mood and Affect: Mood normal.         Behavior: Behavior normal.         Thought Content:  Thought content normal.         Judgment: Judgment normal.     Discussion with Family: Done  Patient Information Sharing: With the consent of Aditya Walker, their loved ones Leandro Taveras were notified today by inpatient team of the patient’s condition and current plan. All questions answered. Discharge instructions/Information to patient and family:   See after visit summary for information provided to patient and family. Discharge Medications:     Medication List      CONTINUE taking these medications     PEN NEEDLES 29GX1/2" 29G X 12MM Misc; Use 1 needle daily for   subcutaneous injections     ASK your doctor about these medications     amLODIPine 10 mg tablet; Commonly known as: Madonna Buitrago; Take 1 tablet (10   mg total) by mouth daily   aspirin 81 mg EC tablet; Commonly known as: ECOTRIN LOW STRENGTH; Take 1   tablet (81 mg total) by mouth daily   atorvastatin 80 mg tablet; Commonly known as: LIPITOR; Take 1 tablet (80   mg total) by mouth daily   carvedilol 6.25 mg tablet; Commonly known as: COREG; TAKE ONE TABLET BY   MOUTH TWICE A DAY WITH MEALS   cetirizine 10 mg tablet; Commonly known as: ZyrTEC; Take 1 tablet (10 mg   total) by mouth daily   * clindamycin 1 % lotion; Commonly known as: CLEOCIN T; Apply topically   2 (two) times a day   * clindamycin 1 % gel; Commonly known as: CLINDAGEL; Apply topically 2   (two) times a day   EPINEPHrine 0.3 mg/0.3 mL Soaj; Commonly known as: Damariharvinder Teran; Inject 0.3 mL   (0.3 mg total) into a muscle once for 1 dose   fish oil 1,000 mg; Take 1 capsule (1,000 mg total) by mouth 2 (two)   times a day   * Humira Pen 80 MG/0.8ML Pnkt; Generic drug: Adalimumab; Inject 80 mg on   week 6 then every other week there after for maintenance dose. * Humira Pen 80 MG/0.8ML Pnkt; Generic drug: Adalimumab; Inject 160 mg   on week 0 then 80 mg on week 2 and week 4 for loading dose.    lisinopril 20 mg tablet; Commonly known as: ZESTRIL; Take 1 tablet (20   mg total) by mouth daily   metFORMIN 1000 MG tablet; Commonly known as: GLUCOPHAGE; Take 1 tablet   (1,000 mg total) by mouth 2 (two) times a day with meals   mupirocin 2 % ointment; Commonly known as: BACTROBAN; Apply topically   daily   Victoza injection; Generic drug: liraglutide; INJECT 0.6MG UNDER THE   SKIN DAILY FOR A WEEK, THEN INCREASE TO 1.2MG DAILY ON THE SECOND WEEK,   THEN INCREASE TO 1.8MG DAILY ON THE THIRD WEEK AND GOING FORWARD   zinc sulfate 220 mg capsule; Commonly known as: ZINCATE; Take 1 capsule   (220 mg total) by mouth daily  * This list has 4 medication(s) that are the same as other medications   prescribed for you. Read the directions carefully, and ask your doctor or   other care provider to review them with you. Disposition:     Home     Discharge Statement:    I spent 30 minutes discharging the patient. This time was spent on the day of discharge. I had direct contact with the patient on the day of discharge. Greater than 50% of the total time was spent examining patient, answering all patient questions, arranging and discussing plan of care with patient as well as directly providing post-discharge instructions. Additional time then spent on discharge activities.      ** Please Note: This note has been constructed using a voice recognition system **     [facility] [facility], [place]   10/09/23  7:05 PM

## 2023-10-10 VITALS
DIASTOLIC BLOOD PRESSURE: 78 MMHG | HEIGHT: 67 IN | SYSTOLIC BLOOD PRESSURE: 135 MMHG | OXYGEN SATURATION: 92 % | TEMPERATURE: 97.5 F | RESPIRATION RATE: 18 BRPM | WEIGHT: 315 LBS | BODY MASS INDEX: 49.44 KG/M2 | HEART RATE: 63 BPM

## 2023-10-10 LAB
ANION GAP SERPL CALCULATED.3IONS-SCNC: 6 MMOL/L
BASOPHILS # BLD AUTO: 0.04 THOUSANDS/ÂΜL (ref 0–0.1)
BASOPHILS NFR BLD AUTO: 1 % (ref 0–1)
BUN SERPL-MCNC: 20 MG/DL (ref 5–25)
CALCIUM SERPL-MCNC: 7.9 MG/DL (ref 8.4–10.2)
CHLORIDE SERPL-SCNC: 101 MMOL/L (ref 96–108)
CO2 SERPL-SCNC: 27 MMOL/L (ref 21–32)
CREAT SERPL-MCNC: 0.93 MG/DL (ref 0.6–1.3)
EOSINOPHIL # BLD AUTO: 0.21 THOUSAND/ÂΜL (ref 0–0.61)
EOSINOPHIL NFR BLD AUTO: 3 % (ref 0–6)
ERYTHROCYTE [DISTWIDTH] IN BLOOD BY AUTOMATED COUNT: 13.7 % (ref 11.6–15.1)
GFR SERPL CREATININE-BSD FRML MDRD: 100 ML/MIN/1.73SQ M
GLUCOSE SERPL-MCNC: 128 MG/DL (ref 65–140)
GLUCOSE SERPL-MCNC: 140 MG/DL (ref 65–140)
GLUCOSE SERPL-MCNC: 194 MG/DL (ref 65–140)
HCT VFR BLD AUTO: 34.5 % (ref 36.5–49.3)
HGB BLD-MCNC: 11.2 G/DL (ref 12–17)
IMM GRANULOCYTES # BLD AUTO: 0.08 THOUSAND/UL (ref 0–0.2)
IMM GRANULOCYTES NFR BLD AUTO: 1 % (ref 0–2)
LYMPHOCYTES # BLD AUTO: 1.49 THOUSANDS/ÂΜL (ref 0.6–4.47)
LYMPHOCYTES NFR BLD AUTO: 22 % (ref 14–44)
MCH RBC QN AUTO: 28.8 PG (ref 26.8–34.3)
MCHC RBC AUTO-ENTMCNC: 32.5 G/DL (ref 31.4–37.4)
MCV RBC AUTO: 89 FL (ref 82–98)
MONOCYTES # BLD AUTO: 0.46 THOUSAND/ÂΜL (ref 0.17–1.22)
MONOCYTES NFR BLD AUTO: 7 % (ref 4–12)
NEUTROPHILS # BLD AUTO: 4.45 THOUSANDS/ÂΜL (ref 1.85–7.62)
NEUTS SEG NFR BLD AUTO: 66 % (ref 43–75)
NRBC BLD AUTO-RTO: 0 /100 WBCS
PLATELET # BLD AUTO: 289 THOUSANDS/UL (ref 149–390)
PMV BLD AUTO: 9.2 FL (ref 8.9–12.7)
POTASSIUM SERPL-SCNC: 4.1 MMOL/L (ref 3.5–5.3)
RBC # BLD AUTO: 3.89 MILLION/UL (ref 3.88–5.62)
SODIUM SERPL-SCNC: 134 MMOL/L (ref 135–147)
WBC # BLD AUTO: 6.73 THOUSAND/UL (ref 4.31–10.16)

## 2023-10-10 PROCEDURE — 85025 COMPLETE CBC W/AUTO DIFF WBC: CPT

## 2023-10-10 PROCEDURE — 80048 BASIC METABOLIC PNL TOTAL CA: CPT

## 2023-10-10 PROCEDURE — 82948 REAGENT STRIP/BLOOD GLUCOSE: CPT

## 2023-10-10 RX ORDER — DOXYCYCLINE HYCLATE 100 MG/1
100 CAPSULE ORAL EVERY 12 HOURS SCHEDULED
Qty: 4 CAPSULE | Refills: 0 | Status: SHIPPED | OUTPATIENT
Start: 2023-10-10 | End: 2023-10-12

## 2023-10-10 RX ORDER — CEPHALEXIN 500 MG/1
500 CAPSULE ORAL EVERY 6 HOURS SCHEDULED
Qty: 8 CAPSULE | Refills: 0 | Status: SHIPPED | OUTPATIENT
Start: 2023-10-10 | End: 2023-10-10

## 2023-10-10 RX ORDER — ZINC GLUCONATE 50 MG
50 TABLET ORAL DAILY
Qty: 30 TABLET | Refills: 1 | Status: SHIPPED | OUTPATIENT
Start: 2023-10-10

## 2023-10-10 RX ADMIN — CARVEDILOL 6.25 MG: 3.12 TABLET, FILM COATED ORAL at 07:42

## 2023-10-10 RX ADMIN — OXYCODONE HYDROCHLORIDE 5 MG: 5 TABLET ORAL at 05:17

## 2023-10-10 RX ADMIN — CEFAZOLIN SODIUM 2000 MG: 2 SOLUTION INTRAVENOUS at 01:07

## 2023-10-10 RX ADMIN — ASPIRIN 81 MG CHEWABLE TABLET 81 MG: 81 TABLET CHEWABLE at 08:36

## 2023-10-10 RX ADMIN — OMEGA-3 FATTY ACIDS CAP 1000 MG 1000 MG: 1000 CAP at 08:36

## 2023-10-10 RX ADMIN — ACETAMINOPHEN 650 MG: 325 TABLET ORAL at 05:28

## 2023-10-10 RX ADMIN — ATORVASTATIN CALCIUM 80 MG: 40 TABLET, FILM COATED ORAL at 08:36

## 2023-10-10 RX ADMIN — CEFAZOLIN SODIUM 2000 MG: 2 SOLUTION INTRAVENOUS at 09:04

## 2023-10-10 RX ADMIN — HYDROMORPHONE HYDROCHLORIDE 1 MG: 1 INJECTION, SOLUTION INTRAMUSCULAR; INTRAVENOUS; SUBCUTANEOUS at 06:27

## 2023-10-10 RX ADMIN — SODIUM CHLORIDE 75 ML/HR: 0.9 INJECTION, SOLUTION INTRAVENOUS at 06:23

## 2023-10-10 RX ADMIN — INSULIN LISPRO 2 UNITS: 100 INJECTION, SOLUTION INTRAVENOUS; SUBCUTANEOUS at 11:37

## 2023-10-10 NOTE — PLAN OF CARE
Problem: PAIN - ADULT  Goal: Verbalizes/displays adequate comfort level or baseline comfort level  Description: Interventions:  - Encourage patient to monitor pain and request assistance  - Assess pain using appropriate pain scale  - Administer analgesics based on type and severity of pain and evaluate response  - Implement non-pharmacological measures as appropriate and evaluate response  - Consider cultural and social influences on pain and pain management  - Notify physician/advanced practitioner if interventions unsuccessful or patient reports new pain  Outcome: Progressing     Problem: INFECTION - ADULT  Goal: Absence or prevention of progression during hospitalization  Description: INTERVENTIONS:  - Assess and monitor for signs and symptoms of infection  - Monitor lab/diagnostic results  - Monitor all insertion sites, i.e. indwelling lines, tubes, and drains  - Monitor endotracheal if appropriate and nasal secretions for changes in amount and color  - [place] appropriate cooling/warming therapies per order  - Administer medications as ordered  - Instruct and encourage patient and family to use good hand hygiene technique  - Identify and instruct in appropriate isolation precautions for identified infection/condition  Outcome: Progressing     Problem: SKIN/TISSUE INTEGRITY - ADULT  Goal: Skin Integrity remains intact(Skin Breakdown Prevention)  Description: Assess:  -Inspect skin when repositioning, toileting, and assisting with ADL  -Assess extremities for adequate circulation and sensation       Toileting:  -Offer bedside commode    Activity:  -Encourage activity and walks on unit  -Encourage or provide ROM exercises   -Use appropriate equipment to lift or move patient in bed    Skin Care:  -Avoid use of baby powder, tape, friction and shearing, hot water or constrictive clothing  -Do not massage red bony areas    Next Steps  Outcome: Progressing  Goal: Incision(s), wounds(s) or drain site(s) healing without S/S of infection  Description: INTERVENTIONS  - Assess and document dressing, incision, wound bed, drain sites and surrounding tissue  - Provide patient and family education  Outcome: Progressing

## 2023-10-10 NOTE — PLAN OF CARE
Problem: PAIN - ADULT  Goal: Verbalizes/displays adequate comfort level or baseline comfort level  Description: Interventions:  - Encourage patient to monitor pain and request assistance  - Assess pain using appropriate pain scale  - Administer analgesics based on type and severity of pain and evaluate response  - Implement non-pharmacological measures as appropriate and evaluate response  - Consider cultural and social influences on pain and pain management  - Notify physician/advanced practitioner if interventions unsuccessful or patient reports new pain  Outcome: Progressing     Problem: INFECTION - ADULT  Goal: Absence or prevention of progression during hospitalization  Description: INTERVENTIONS:  - Assess and monitor for signs and symptoms of infection  - Monitor lab/diagnostic results  - Monitor all insertion sites, i.e. indwelling lines, tubes, and drains  - Monitor endotracheal if appropriate and nasal secretions for changes in amount and color  - [place] appropriate cooling/warming therapies per order  - Administer medications as ordered  - Instruct and encourage patient and family to use good hand hygiene technique  - Identify and instruct in appropriate isolation precautions for identified infection/condition  Outcome: Progressing     Problem: SKIN/TISSUE INTEGRITY - ADULT  Goal: Skin Integrity remains intact(Skin Breakdown Prevention)  Description: Assess:  -Perform Juan assessment every   -Clean and moisturize skin every   -Inspect skin when repositioning, toileting, and assisting with ADLS  -Assess under medical devices such as  every   -Assess extremities for adequate circulation and sensation     Bed Management:  -Have minimal linens on bed & keep smooth, unwrinkled  -Change linens as needed when moist or perspiring  -Avoid sitting or lying in one position for more than  hours while in bed  -Keep HOB at degrees     Toileting:  -Offer bedside commode  -Assess for incontinence every   -Use incontinent care products after each incontinent episode such as     Activity:  -Mobilize patient  times a day  -Encourage activity and walks on unit  -Encourage or provide ROM exercises   -Turn and reposition patient every  Hours  -Use appropriate equipment to lift or move patient in bed  -Instruct/ Assist with weight shifting every  when out of bed in chair  -Consider limitation of chair time  hour intervals    Skin Care:  -Avoid use of baby powder, tape, friction and shearing, hot water or constrictive clothing  -Relieve pressure over bony prominences using   -Do not massage red bony areas    Next Steps:  -Teach patient strategies to minimize risks such as    -Consider consults to  interdisciplinary teams such as   Outcome: Progressing  Goal: Incision(s), wounds(s) or drain site(s) healing without S/S of infection  Description: INTERVENTIONS  - Assess and document dressing, incision, wound bed, drain sites and surrounding tissue  - Provide patient and family education  - Perform skin care/dressing changes every  Outcome: Progressing

## 2023-10-10 NOTE — NURSING NOTE
Pt walked out with PCA. IV removed. Abdiel removed. Discharge instructions given to pt. Also wound care appt tomorrow. Follow up with vascular as an outpt, PT/OT for lymphedema drainage, and sleep study to be scheduled by pt, all phone numbers given to patient. All belongings with pt. Pt has a work note.

## 2023-10-10 NOTE — DISCHARGE INSTR - AVS FIRST PAGE
Completed 2-day course of doxycycline 100 mg p.o. every 12 H  Take zinc daily to help with wound healing  Follow-up with vascular surgery and lymphedema clinic  Follow-up of sleep medicine for possible ANGY  Continue wound care

## 2023-10-10 NOTE — PLAN OF CARE
Problem: PAIN - ADULT  Goal: Verbalizes/displays adequate comfort level or baseline comfort level  Description: Interventions:  - Encourage patient to monitor pain and request assistance  - Assess pain using appropriate pain scale  - Administer analgesics based on type and severity of pain and evaluate response  - Implement non-pharmacological measures as appropriate and evaluate response  - Consider cultural and social influences on pain and pain management  - Notify physician/advanced practitioner if interventions unsuccessful or patient reports new pain  10/10/2023 1330 by Raphael Ribeiro RN  Outcome: Completed  10/10/2023 0757 by Raphael Ribeiro RN  Outcome: Progressing     Problem: INFECTION - ADULT  Goal: Absence or prevention of progression during hospitalization  Description: INTERVENTIONS:  - Assess and monitor for signs and symptoms of infection  - Monitor lab/diagnostic results  - Monitor all insertion sites, i.e. indwelling lines, tubes, and drains  - Monitor endotracheal if appropriate and nasal secretions for changes in amount and color  - [place] appropriate cooling/warming therapies per order  - Administer medications as ordered  - Instruct and encourage patient and family to use good hand hygiene technique  - Identify and instruct in appropriate isolation precautions for identified infection/condition  10/10/2023 1330 by Raphael Ribeiro RN  Outcome: Completed  10/10/2023 0757 by Raphael Ribeiro RN  Outcome: Progressing     Problem: SKIN/TISSUE INTEGRITY - ADULT  Goal: Skin Integrity remains intact(Skin Breakdown Prevention)  Description: Assess:  -Perform Juan assessment every   -Clean and moisturize skin every   -Inspect skin when repositioning, toileting, and assisting with ADLS  -Assess under medical devices such as  every   -Assess extremities for adequate circulation and sensation     Bed Management:  -Have minimal linens on bed & keep smooth, unwrinkled  -Change linens as needed when moist or perspiring  -Avoid sitting or lying in one position for more than  hours while in bed  -Keep HOB at degrees     Toileting:  -Offer bedside commode  -Assess for incontinence every   -Use incontinent care products after each incontinent episode such as     Activity:  -Mobilize patient  times a day  -Encourage activity and walks on unit  -Encourage or provide ROM exercises   -Turn and reposition patient every  Hours  -Use appropriate equipment to lift or move patient in bed  -Instruct/ Assist with weight shifting every  when out of bed in chair  -Consider limitation of chair time  hour intervals    Skin Care:  -Avoid use of baby powder, tape, friction and shearing, hot water or constrictive clothing  -Relieve pressure over bony prominences using  -Do not massage red bony areas    Next Steps:  -Teach patient strategies to minimize risks such as    -Consider consults to  interdisciplinary teams such as   10/10/2023 1330 by Nova Mcclure RN  Outcome: Completed  10/10/2023 0757 by Nova Mcclure RN  Outcome: Progressing  Goal: Incision(s), wounds(s) or drain site(s) healing without S/S of infection  Description: INTERVENTIONS  - Assess and document dressing, incision, wound bed, drain sites and surrounding tissue  - Provide patient and family education  - Perform skin care/dressing changes  10/10/2023 1330 by Nova Mcclure RN  Outcome: Completed  10/10/2023 0757 by Nova Mcclure RN  Outcome: Progressing

## 2023-10-12 LAB
BACTERIA BLD CULT: NORMAL
BACTERIA BLD CULT: NORMAL

## 2023-10-13 NOTE — UTILIZATION REVIEW
NOTIFICATION OF ADMISSION DISCHARGE   This is a Notification of Discharge from [address]. Please be advised that this patient has been discharge from our facility. Below you will find the admission and discharge date and time including the patient’s disposition. UTILIZATION REVIEW CONTACT:  Kristopher Kelley  Utilization   Network Utilization Review Department  Phone: [phone] x carefully listen to the prompts. All voicemails are confidential.  Email: [email or web address]     ADMISSION INFORMATION  PRESENTATION DATE: 10/6/2023  8:56 PM  OBERVATION ADMISSION DATE:   INPATIENT ADMISSION DATE: 10/6/23 11:59 PM   DISCHARGE DATE: 10/10/2023  4:06 PM   DISPOSITION:Home/Self Care    Network Utilization Review Department  ATTENTION: Please call with any questions or concerns to [phone] and carefully listen to the prompts so that you are directed to the right person. All voicemails are confidential.   For Discharge needs, contact Care Management DC Support Team at [phone] opt. 2  Send all requests for admission clinical reviews, approved or denied determinations and any other requests to dedicated fax number below belonging to the campus where the patient is receiving treatment.  List of dedicated fax numbers for the Facilities:  [place] DENIALS (Administrative/Medical Necessity) [phone]   DISCHARGE SUPPORT TEAM (Network) [phone] [facility] (Maternity/NICU/Pediatrics) [phone]   [address] [facility] [address] [phone] [id number] [facility] [phone]   [address] [place] [address]  [address]  [phone]

## 2023-10-23 ENCOUNTER — TELEPHONE (OUTPATIENT)
Dept: FAMILY MEDICINE CLINIC | Facility: CLINIC | Age: 43
End: 2023-10-23

## 2023-10-23 NOTE — TELEPHONE ENCOUNTER
Dr Jie Contreras from [address] [place] [address] Case management called to discuss pts recent hospital stay. Pt is uncompliant. He is not actively filling meds when due for them. He told Dr Radha Irvin he does not need a hospital follow up as he is managing his health just fine. .     Dr Radha Irvin wanted to make us aware as of Dec 1 no cost for following insulin:  Humalog   Humalin   Lantus Byrneo       Pt has A1C nearing insulin required levels.      - Julian Larson   Ph# [phone] ext [phone]

## 2023-10-31 DIAGNOSIS — E11.42 DM TYPE 2 WITH DIABETIC PERIPHERAL NEUROPATHY (HCC): ICD-10-CM

## 2023-11-01 RX ORDER — PEN NEEDLE, DIABETIC 29 G X1/2"
NEEDLE, DISPOSABLE MISCELLANEOUS
Qty: 50 EACH | Refills: 3 | Status: SHIPPED | OUTPATIENT
Start: 2023-11-01

## 2023-12-07 PROBLEM — A41.9 SEPSIS (HCC): Status: RESOLVED | Noted: 2022-05-04 | Resolved: 2023-12-07

## 2023-12-11 NOTE — OP NOTE
OPERATIVE REPORT  PATIENT NAME: Jewel Connor    :  1980  MRN: [id number]  Pt Location: [facility] OR ROOM 07    SURGERY DATE: 2021    Surgeon(s) and Role:     * Corona Nick DO - Primary     * Virginie Gilliland MD - Pepito Wagner MD - Assisting    Preop Diagnosis:  Cutaneous abscess of groin [L02 214]    Post-Op Diagnosis Codes:     * Cutaneous abscess of groin [L02 214]    Procedure(s) (LRB):  DEBRIDEMENT LOWER EXTREMITY ([address] OUT) (Left)    Specimen(s):  ID Type Source Tests Collected by Time Destination   A : left leg wound Wound Wound ANAEROBIC CULTURE AND GRAM STAIN, STAT GRAM STAIN, WOUND CULTURE Corona Nick, DO 2021 1844    B : left leg wound #2 Wound Wound ANAEROBIC CULTURE AND GRAM STAIN, STAT GRAM STAIN, WOUND CULTURE Corona Nick, DO 2021 1909        Estimated Blood Loss:   Minimal    Drains:  [REMOVED] Negative Pressure Wound Therapy (V A C ) Other (Comment) (Removed)   Number of days: 16       [REMOVED] Negative Pressure Wound Therapy (V A C ) Groin (Removed)   Number of days: 1       [REMOVED] NG/OG/Enteral Tube Orogastric 18 Fr (Removed)   Number of days: 1       [REMOVED] Urethral Catheter Latex 16 Fr  (Removed)   Number of days: 2       Anesthesia Type:   General    Operative Indications:  Cutaneous abscess of groin [L02 214]    Operative Findings:  No necrotizing soft tissue infection seen  Discolored fluid collection capsule which was debrided and sent for culture  Culture swabs also sent x2  1x betadine soaked kerlix placed    Complications:   None    Procedure and Technique: This is a 37year old male who presents in hypotensive shock with concern for necrotizing soft tissue infection in need of surgical exploration  All risks, benefits, and alternatives were discussed and the patient agreed with the plan of care  Patient was identified by armband and verbal communication and brought back to the operating room   He was induced by general Patient called requesting lab orders to be placed.   anesthesia via endotracheal intubation  His left lower extremity was prepped circumferentially with betadine and draped in usual sterile fashion  A time out was called to review the procedure and its details  Antibiotics were administered  A 15cm incision was made transversely over the area of most induration/tenderness and carried down through skin and subcutaneous tissues with electrocautery  Various small venous branches were encountered and ligated  Fascia was encountered and appeared healthy  It was subsequently divided sharply to expose muscle, which was pink and healthy and reacted well with stimulation  Given lack of findings in this area, we proceeded to open the previous I&D incision made earlier that day approximately 10cm in length to fully visualize the fluid cavity  The wound did tunnel to the more inferior incision we had already created  The capsule of the previous fluid collection appeared to be somewhat dusky in nature so was debrided and sent for tissue culture  Culture swabs x2 were subsequently taken of the wounds and sent for culture  Fascia was inspected and appeared healthy  After sharply dividing the fascia, we similarly inspected muscle in this area which also appeared healthy and reacted well to electrocautery stimulation  Upon inspection there was no tracking or other concerning areas, so at this point there did not appear to be any evidence of necrotizing soft tissue infection  Wound was irrigated and hemostasis achieved  One betadine-soaked kerlix was inserted into both incisions  Maxorb was applied to previous groin wound site and all areas were covered with ABDs  Patient was then woken from general anesthesia and brought to the PACU in stable condition  All instrument, needle, and sponge counts were correct at the conclusion of the case  Radiofrequency scanning was negative  Dr Hattie Garcia was present for all critical portions of the case        Patient Disposition:  PACU SIGNATURE: Allie Carrion MD  DATE: May 26, 2021  TIME: 7:23 PM

## 2023-12-21 ENCOUNTER — TELEPHONE (OUTPATIENT)
Dept: DERMATOLOGY | Facility: CLINIC | Age: 43
End: 2023-12-21

## 2023-12-21 ENCOUNTER — TELEPHONE (OUTPATIENT)
Age: 43
End: 2023-12-21

## 2023-12-21 NOTE — TELEPHONE ENCOUNTER
Fax received from [employer]. Prior auth is needed for Humira Pen 80mg/0.8 ma pen injector kit. Form scanned into chart for review/completion.

## 2023-12-21 NOTE — TELEPHONE ENCOUNTER
Spoke with Jessica from [employer]. She called to confirm if patient had completed a TB test and if it was negative.    I reviewed patients chart and his TB test was done back on 4/2023 and came back negative. Office fax was given [phone]  No further information needed.

## 2023-12-21 NOTE — TELEPHONE ENCOUNTER
PA submitted for Humira via [facility]: [id number]. Clinical questions answered, chart notes sent. Awaiting determination.

## 2024-01-16 ENCOUNTER — TELEPHONE (OUTPATIENT)
Dept: FAMILY MEDICINE CLINIC | Facility: CLINIC | Age: 44
End: 2024-01-16

## 2024-01-16 NOTE — TELEPHONE ENCOUNTER
Pt called stating Victoza is no longer covered by insurance. [facility] was $9,000.     Dr Horne- can you please send an alterative of Victoza to [facility]?

## 2024-01-25 ENCOUNTER — HOSPITAL ENCOUNTER (INPATIENT)
Facility: HOSPITAL | Age: 44
LOS: 2 days | Discharge: HOME | DRG: 871 | End: 2024-01-30
Attending: EMERGENCY MEDICINE | Admitting: INTERNAL MEDICINE
Payer: COMMERCIAL

## 2024-01-25 DIAGNOSIS — L03.116 CELLULITIS OF LEFT LOWER EXTREMITY: Primary | ICD-10-CM

## 2024-01-25 DIAGNOSIS — I89.0 LYMPHEDEMA OF BOTH LOWER EXTREMITIES: ICD-10-CM

## 2024-01-25 DIAGNOSIS — E11.42 TYPE 2 DIABETES MELLITUS WITH DIABETIC POLYNEUROPATHY, WITHOUT LONG-TERM CURRENT USE OF INSULIN (HCC): ICD-10-CM

## 2024-01-25 DIAGNOSIS — L03.90 CELLULITIS: ICD-10-CM

## 2024-01-25 DIAGNOSIS — I10 ESSENTIAL HYPERTENSION: ICD-10-CM

## 2024-01-25 DIAGNOSIS — A41.9 SEPSIS (HCC): ICD-10-CM

## 2024-01-25 LAB
ALBUMIN SERPL BCP-MCNC: 3 G/DL (ref 3.5–5)
ALP SERPL-CCNC: 73 U/L (ref 34–104)
ALT SERPL W P-5'-P-CCNC: 12 U/L (ref 7–52)
ANION GAP SERPL CALCULATED.3IONS-SCNC: 9 MMOL/L
AST SERPL W P-5'-P-CCNC: 28 U/L (ref 13–39)
BASOPHILS # BLD AUTO: 0.05 THOUSANDS/ÂΜL (ref 0–0.1)
BASOPHILS NFR BLD AUTO: 0 % (ref 0–1)
BILIRUB SERPL-MCNC: 0.5 MG/DL (ref 0.2–1)
BUN SERPL-MCNC: 28 MG/DL (ref 5–25)
CALCIUM ALBUM COR SERPL-MCNC: 8.7 MG/DL (ref 8.3–10.1)
CALCIUM SERPL-MCNC: 7.9 MG/DL (ref 8.4–10.2)
CHLORIDE SERPL-SCNC: 93 MMOL/L (ref 96–108)
CO2 SERPL-SCNC: 25 MMOL/L (ref 21–32)
CREAT SERPL-MCNC: 1.59 MG/DL (ref 0.6–1.3)
EOSINOPHIL # BLD AUTO: 0.01 THOUSAND/ÂΜL (ref 0–0.61)
EOSINOPHIL NFR BLD AUTO: 0 % (ref 0–6)
ERYTHROCYTE [DISTWIDTH] IN BLOOD BY AUTOMATED COUNT: 13 % (ref 11.6–15.1)
FLUAV RNA RESP QL NAA+PROBE: NEGATIVE
FLUBV RNA RESP QL NAA+PROBE: NEGATIVE
GFR SERPL CREATININE-BSD FRML MDRD: 52 ML/MIN/1.73SQ M
GLUCOSE SERPL-MCNC: 168 MG/DL (ref 65–140)
GLUCOSE SERPL-MCNC: 178 MG/DL (ref 65–140)
GLUCOSE SERPL-MCNC: 179 MG/DL (ref 65–140)
HCT VFR BLD AUTO: 37.5 % (ref 36.5–49.3)
HGB BLD-MCNC: 12.5 G/DL (ref 12–17)
IMM GRANULOCYTES # BLD AUTO: 0.12 THOUSAND/UL (ref 0–0.2)
IMM GRANULOCYTES NFR BLD AUTO: 1 % (ref 0–2)
LACTATE SERPL-SCNC: 1 MMOL/L (ref 0.5–2)
LYMPHOCYTES # BLD AUTO: 0.7 THOUSANDS/ÂΜL (ref 0.6–4.47)
LYMPHOCYTES NFR BLD AUTO: 5 % (ref 14–44)
MCH RBC QN AUTO: 27.7 PG (ref 26.8–34.3)
MCHC RBC AUTO-ENTMCNC: 33.3 G/DL (ref 31.4–37.4)
MCV RBC AUTO: 83 FL (ref 82–98)
MONOCYTES # BLD AUTO: 0.81 THOUSAND/ÂΜL (ref 0.17–1.22)
MONOCYTES NFR BLD AUTO: 6 % (ref 4–12)
NEUTROPHILS # BLD AUTO: 13.13 THOUSANDS/ÂΜL (ref 1.85–7.62)
NEUTS SEG NFR BLD AUTO: 88 % (ref 43–75)
NRBC BLD AUTO-RTO: 0 /100 WBCS
PLATELET # BLD AUTO: 274 THOUSANDS/UL (ref 149–390)
PMV BLD AUTO: 9.5 FL (ref 8.9–12.7)
POTASSIUM SERPL-SCNC: 4.8 MMOL/L (ref 3.5–5.3)
PROT SERPL-MCNC: 7.7 G/DL (ref 6.4–8.4)
RBC # BLD AUTO: 4.51 MILLION/UL (ref 3.88–5.62)
RSV RNA RESP QL NAA+PROBE: NEGATIVE
SARS-COV-2 RNA RESP QL NAA+PROBE: NEGATIVE
SODIUM SERPL-SCNC: 127 MMOL/L (ref 135–147)
WBC # BLD AUTO: 14.82 THOUSAND/UL (ref 4.31–10.16)

## 2024-01-25 PROCEDURE — 83605 ASSAY OF LACTIC ACID: CPT | Performed by: EMERGENCY MEDICINE

## 2024-01-25 PROCEDURE — 36415 COLL VENOUS BLD VENIPUNCTURE: CPT | Performed by: EMERGENCY MEDICINE

## 2024-01-25 PROCEDURE — 87040 BLOOD CULTURE FOR BACTERIA: CPT | Performed by: EMERGENCY MEDICINE

## 2024-01-25 PROCEDURE — 96375 TX/PRO/DX INJ NEW DRUG ADDON: CPT

## 2024-01-25 PROCEDURE — 0241U HB NFCT DS VIR RESP RNA 4 TRGT: CPT | Performed by: EMERGENCY MEDICINE

## 2024-01-25 PROCEDURE — 82948 REAGENT STRIP/BLOOD GLUCOSE: CPT

## 2024-01-25 PROCEDURE — 85025 COMPLETE CBC W/AUTO DIFF WBC: CPT | Performed by: EMERGENCY MEDICINE

## 2024-01-25 PROCEDURE — 99283 EMERGENCY DEPT VISIT LOW MDM: CPT

## 2024-01-25 PROCEDURE — 80053 COMPREHEN METABOLIC PANEL: CPT | Performed by: EMERGENCY MEDICINE

## 2024-01-25 PROCEDURE — 96374 THER/PROPH/DIAG INJ IV PUSH: CPT

## 2024-01-25 PROCEDURE — 99285 EMERGENCY DEPT VISIT HI MDM: CPT | Performed by: EMERGENCY MEDICINE

## 2024-01-25 RX ORDER — CEFAZOLIN SODIUM 2 G/50ML
2000 SOLUTION INTRAVENOUS EVERY 8 HOURS
Status: DISCONTINUED | OUTPATIENT
Start: 2024-01-26 | End: 2024-01-30 | Stop reason: HOSPADM

## 2024-01-25 RX ORDER — HYDROMORPHONE HCL IN WATER/PF 6 MG/30 ML
0.2 PATIENT CONTROLLED ANALGESIA SYRINGE INTRAVENOUS EVERY 6 HOURS PRN
Status: DISCONTINUED | OUTPATIENT
Start: 2024-01-25 | End: 2024-01-26

## 2024-01-25 RX ORDER — SODIUM CHLORIDE 9 MG/ML
125 INJECTION, SOLUTION INTRAVENOUS CONTINUOUS
Status: DISCONTINUED | OUTPATIENT
Start: 2024-01-25 | End: 2024-01-27

## 2024-01-25 RX ORDER — AMLODIPINE BESYLATE 10 MG/1
10 TABLET ORAL DAILY
Status: DISCONTINUED | OUTPATIENT
Start: 2024-01-26 | End: 2024-01-26

## 2024-01-25 RX ORDER — ATORVASTATIN CALCIUM 80 MG/1
80 TABLET, FILM COATED ORAL DAILY
Status: DISCONTINUED | OUTPATIENT
Start: 2024-01-26 | End: 2024-01-30 | Stop reason: HOSPADM

## 2024-01-25 RX ORDER — ACETAMINOPHEN 325 MG/1
650 TABLET ORAL EVERY 6 HOURS PRN
Status: DISCONTINUED | OUTPATIENT
Start: 2024-01-25 | End: 2024-01-26

## 2024-01-25 RX ORDER — HYDROMORPHONE HCL/PF 1 MG/ML
0.5 SYRINGE (ML) INJECTION ONCE
Status: COMPLETED | OUTPATIENT
Start: 2024-01-25 | End: 2024-01-25

## 2024-01-25 RX ORDER — CEFAZOLIN SODIUM 2 G/50ML
2000 SOLUTION INTRAVENOUS EVERY 8 HOURS
Status: DISCONTINUED | OUTPATIENT
Start: 2024-01-25 | End: 2024-01-25

## 2024-01-25 RX ORDER — KETOROLAC TROMETHAMINE 30 MG/ML
15 INJECTION, SOLUTION INTRAMUSCULAR; INTRAVENOUS ONCE
Status: COMPLETED | OUTPATIENT
Start: 2024-01-25 | End: 2024-01-25

## 2024-01-25 RX ORDER — CARVEDILOL 6.25 MG/1
6.25 TABLET ORAL 2 TIMES DAILY WITH MEALS
Status: DISCONTINUED | OUTPATIENT
Start: 2024-01-26 | End: 2024-01-30 | Stop reason: HOSPADM

## 2024-01-25 RX ORDER — ONDANSETRON 2 MG/ML
4 INJECTION INTRAMUSCULAR; INTRAVENOUS ONCE
Status: COMPLETED | OUTPATIENT
Start: 2024-01-25 | End: 2024-01-25

## 2024-01-25 RX ORDER — ZINC SULFATE 50(220)MG
220 CAPSULE ORAL DAILY
Status: DISCONTINUED | OUTPATIENT
Start: 2024-01-26 | End: 2024-01-30 | Stop reason: HOSPADM

## 2024-01-25 RX ORDER — INSULIN LISPRO 100 [IU]/ML
2-12 INJECTION, SOLUTION INTRAVENOUS; SUBCUTANEOUS
Status: DISCONTINUED | OUTPATIENT
Start: 2024-01-25 | End: 2024-01-27

## 2024-01-25 RX ORDER — ONDANSETRON 2 MG/ML
4 INJECTION INTRAMUSCULAR; INTRAVENOUS EVERY 6 HOURS PRN
Status: DISCONTINUED | OUTPATIENT
Start: 2024-01-25 | End: 2024-01-30 | Stop reason: HOSPADM

## 2024-01-25 RX ORDER — ACETAMINOPHEN 325 MG/1
975 TABLET ORAL ONCE
Status: COMPLETED | OUTPATIENT
Start: 2024-01-25 | End: 2024-01-25

## 2024-01-25 RX ORDER — HEPARIN SODIUM 5000 [USP'U]/ML
7500 INJECTION, SOLUTION INTRAVENOUS; SUBCUTANEOUS EVERY 8 HOURS SCHEDULED
Status: DISCONTINUED | OUTPATIENT
Start: 2024-01-25 | End: 2024-01-30 | Stop reason: HOSPADM

## 2024-01-25 RX ORDER — CALCIUM CARBONATE 500 MG/1
1000 TABLET, CHEWABLE ORAL DAILY PRN
Status: DISCONTINUED | OUTPATIENT
Start: 2024-01-25 | End: 2024-01-30 | Stop reason: HOSPADM

## 2024-01-25 RX ORDER — ASPIRIN 81 MG/1
81 TABLET, CHEWABLE ORAL DAILY
Status: DISCONTINUED | OUTPATIENT
Start: 2024-01-26 | End: 2024-01-30 | Stop reason: HOSPADM

## 2024-01-25 RX ADMIN — VANCOMYCIN HYDROCHLORIDE 2000 MG: 1 INJECTION, POWDER, LYOPHILIZED, FOR SOLUTION INTRAVENOUS at 19:44

## 2024-01-25 RX ADMIN — SODIUM CHLORIDE 100 ML/HR: 0.9 INJECTION, SOLUTION INTRAVENOUS at 22:02

## 2024-01-25 RX ADMIN — SODIUM CHLORIDE 500 ML: 0.9 INJECTION, SOLUTION INTRAVENOUS at 19:52

## 2024-01-25 RX ADMIN — ACETAMINOPHEN 975 MG: 325 TABLET ORAL at 19:48

## 2024-01-25 RX ADMIN — HYDROMORPHONE HYDROCHLORIDE 0.5 MG: 1 INJECTION, SOLUTION INTRAMUSCULAR; INTRAVENOUS; SUBCUTANEOUS at 22:00

## 2024-01-25 RX ADMIN — HEPARIN SODIUM 7500 UNITS: 5000 INJECTION INTRAVENOUS; SUBCUTANEOUS at 23:09

## 2024-01-25 RX ADMIN — INSULIN LISPRO 2 UNITS: 100 INJECTION, SOLUTION INTRAVENOUS; SUBCUTANEOUS at 23:10

## 2024-01-25 RX ADMIN — KETOROLAC TROMETHAMINE 15 MG: 30 INJECTION, SOLUTION INTRAMUSCULAR; INTRAVENOUS at 19:49

## 2024-01-25 RX ADMIN — ONDANSETRON 4 MG: 2 INJECTION INTRAMUSCULAR; INTRAVENOUS at 19:58

## 2024-01-25 NOTE — LETTER
[address] [address]  Dept: [phone]    January 30, 2024     Patient: Joaquin Frankel   YOB: 1980   Date of Visit: 1/25/2024       To Whom it May Concern:    Joaquin Frankel is under my professional care. He was seen in the hospital from 1/25/2024 to 01/30/24. He may return to work on 2/5/2024 without limitations.    If you have any questions or concerns, please don't hesitate to call.         Sincerely,          Lillie Borges, DO

## 2024-01-26 ENCOUNTER — APPOINTMENT (OUTPATIENT)
Dept: RADIOLOGY | Facility: HOSPITAL | Age: 44
DRG: 871 | End: 2024-01-26
Payer: COMMERCIAL

## 2024-01-26 LAB
ALBUMIN SERPL BCP-MCNC: 2.7 G/DL (ref 3.5–5)
ALP SERPL-CCNC: 74 U/L (ref 34–104)
ALT SERPL W P-5'-P-CCNC: 11 U/L (ref 7–52)
AMORPH URATE CRY URNS QL MICRO: ABNORMAL /HPF
ANION GAP SERPL CALCULATED.3IONS-SCNC: 7 MMOL/L
AST SERPL W P-5'-P-CCNC: 17 U/L (ref 13–39)
BACTERIA UR QL AUTO: ABNORMAL /HPF
BASOPHILS # BLD AUTO: 0.02 THOUSANDS/ÂΜL (ref 0–0.1)
BASOPHILS NFR BLD AUTO: 0 % (ref 0–1)
BILIRUB SERPL-MCNC: 0.4 MG/DL (ref 0.2–1)
BILIRUB UR QL STRIP: ABNORMAL
BUN SERPL-MCNC: 33 MG/DL (ref 5–25)
CALCIUM ALBUM COR SERPL-MCNC: 8.7 MG/DL (ref 8.3–10.1)
CALCIUM SERPL-MCNC: 7.7 MG/DL (ref 8.4–10.2)
CHLORIDE SERPL-SCNC: 96 MMOL/L (ref 96–108)
CLARITY UR: ABNORMAL
CO2 SERPL-SCNC: 27 MMOL/L (ref 21–32)
COARSE GRAN CASTS URNS QL MICRO: ABNORMAL /LPF
COLOR UR: ABNORMAL
CREAT SERPL-MCNC: 1.66 MG/DL (ref 0.6–1.3)
CRP SERPL QL: 477 MG/L
EOSINOPHIL # BLD AUTO: 0.03 THOUSAND/ÂΜL (ref 0–0.61)
EOSINOPHIL NFR BLD AUTO: 0 % (ref 0–6)
ERYTHROCYTE [DISTWIDTH] IN BLOOD BY AUTOMATED COUNT: 13.1 % (ref 11.6–15.1)
EST. AVERAGE GLUCOSE BLD GHB EST-MCNC: 301 MG/DL
FINE GRAN CASTS URNS QL MICRO: ABNORMAL /LPF
GFR SERPL CREATININE-BSD FRML MDRD: 50 ML/MIN/1.73SQ M
GLUCOSE P FAST SERPL-MCNC: 157 MG/DL (ref 65–99)
GLUCOSE SERPL-MCNC: 157 MG/DL (ref 65–140)
GLUCOSE SERPL-MCNC: 184 MG/DL (ref 65–140)
GLUCOSE SERPL-MCNC: 212 MG/DL (ref 65–140)
GLUCOSE SERPL-MCNC: 216 MG/DL (ref 65–140)
GLUCOSE SERPL-MCNC: 232 MG/DL (ref 65–140)
GLUCOSE UR STRIP-MCNC: ABNORMAL MG/DL
HBA1C MFR BLD: 12.1 %
HCT VFR BLD AUTO: 36.2 % (ref 36.5–49.3)
HGB BLD-MCNC: 11.4 G/DL (ref 12–17)
HGB UR QL STRIP.AUTO: ABNORMAL
IMM GRANULOCYTES # BLD AUTO: 0.1 THOUSAND/UL (ref 0–0.2)
IMM GRANULOCYTES NFR BLD AUTO: 1 % (ref 0–2)
KETONES UR STRIP-MCNC: ABNORMAL MG/DL
LEUKOCYTE ESTERASE UR QL STRIP: NEGATIVE
LYMPHOCYTES # BLD AUTO: 0.71 THOUSANDS/ÂΜL (ref 0.6–4.47)
LYMPHOCYTES NFR BLD AUTO: 8 % (ref 14–44)
MCH RBC QN AUTO: 26.7 PG (ref 26.8–34.3)
MCHC RBC AUTO-ENTMCNC: 31.5 G/DL (ref 31.4–37.4)
MCV RBC AUTO: 85 FL (ref 82–98)
MONOCYTES # BLD AUTO: 0.71 THOUSAND/ÂΜL (ref 0.17–1.22)
MONOCYTES NFR BLD AUTO: 8 % (ref 4–12)
NEUTROPHILS # BLD AUTO: 7.95 THOUSANDS/ÂΜL (ref 1.85–7.62)
NEUTS SEG NFR BLD AUTO: 83 % (ref 43–75)
NITRITE UR QL STRIP: NEGATIVE
NON-SQ EPI CELLS URNS QL MICRO: ABNORMAL /HPF
NRBC BLD AUTO-RTO: 0 /100 WBCS
OSMOLALITY UR: 535 MMOL/KG
PH UR STRIP.AUTO: 5.5 [PH]
PLATELET # BLD AUTO: 267 THOUSANDS/UL (ref 149–390)
PMV BLD AUTO: 9.6 FL (ref 8.9–12.7)
POTASSIUM SERPL-SCNC: 3.4 MMOL/L (ref 3.5–5.3)
PROCALCITONIN SERPL-MCNC: 4.81 NG/ML
PROT SERPL-MCNC: 7.2 G/DL (ref 6.4–8.4)
PROT UR STRIP-MCNC: ABNORMAL MG/DL
RBC # BLD AUTO: 4.27 MILLION/UL (ref 3.88–5.62)
RBC #/AREA URNS AUTO: ABNORMAL /HPF
SODIUM 24H UR-SCNC: 22 MOL/L
SODIUM SERPL-SCNC: 130 MMOL/L (ref 135–147)
SP GR UR STRIP.AUTO: 1.02 (ref 1–1.03)
UROBILINOGEN UR QL STRIP.AUTO: 1 E.U./DL
WBC # BLD AUTO: 9.52 THOUSAND/UL (ref 4.31–10.16)
WBC #/AREA URNS AUTO: ABNORMAL /HPF

## 2024-01-26 PROCEDURE — 93971 EXTREMITY STUDY: CPT

## 2024-01-26 PROCEDURE — 81001 URINALYSIS AUTO W/SCOPE: CPT | Performed by: EMERGENCY MEDICINE

## 2024-01-26 PROCEDURE — 84300 ASSAY OF URINE SODIUM: CPT

## 2024-01-26 PROCEDURE — 85025 COMPLETE CBC W/AUTO DIFF WBC: CPT

## 2024-01-26 PROCEDURE — 82948 REAGENT STRIP/BLOOD GLUCOSE: CPT

## 2024-01-26 PROCEDURE — 86140 C-REACTIVE PROTEIN: CPT

## 2024-01-26 PROCEDURE — 83935 ASSAY OF URINE OSMOLALITY: CPT

## 2024-01-26 PROCEDURE — 97161 PT EVAL LOW COMPLEX 20 MIN: CPT

## 2024-01-26 PROCEDURE — 99223 1ST HOSP IP/OBS HIGH 75: CPT | Performed by: INTERNAL MEDICINE

## 2024-01-26 PROCEDURE — 80053 COMPREHEN METABOLIC PANEL: CPT

## 2024-01-26 PROCEDURE — 87086 URINE CULTURE/COLONY COUNT: CPT | Performed by: EMERGENCY MEDICINE

## 2024-01-26 PROCEDURE — 93971 EXTREMITY STUDY: CPT | Performed by: SURGERY

## 2024-01-26 PROCEDURE — 73610 X-RAY EXAM OF ANKLE: CPT

## 2024-01-26 PROCEDURE — 83036 HEMOGLOBIN GLYCOSYLATED A1C: CPT

## 2024-01-26 PROCEDURE — 84145 PROCALCITONIN (PCT): CPT

## 2024-01-26 RX ORDER — POTASSIUM CHLORIDE 20 MEQ/1
20 TABLET, EXTENDED RELEASE ORAL ONCE
Status: COMPLETED | OUTPATIENT
Start: 2024-01-26 | End: 2024-01-26

## 2024-01-26 RX ORDER — HYDROMORPHONE HCL/PF 1 MG/ML
0.5 SYRINGE (ML) INJECTION EVERY 6 HOURS PRN
Status: DISCONTINUED | OUTPATIENT
Start: 2024-01-26 | End: 2024-01-27

## 2024-01-26 RX ORDER — HYDROMORPHONE HCL IN WATER/PF 6 MG/30 ML
0.2 PATIENT CONTROLLED ANALGESIA SYRINGE INTRAVENOUS EVERY 6 HOURS PRN
Status: DISCONTINUED | OUTPATIENT
Start: 2024-01-26 | End: 2024-01-26

## 2024-01-26 RX ORDER — POTASSIUM CHLORIDE 20 MEQ/1
40 TABLET, EXTENDED RELEASE ORAL ONCE
Status: COMPLETED | OUTPATIENT
Start: 2024-01-26 | End: 2024-01-26

## 2024-01-26 RX ORDER — AMLODIPINE BESYLATE 10 MG/1
10 TABLET ORAL DAILY
Status: DISCONTINUED | OUTPATIENT
Start: 2024-01-27 | End: 2024-01-30

## 2024-01-26 RX ORDER — HYDROMORPHONE HCL IN WATER/PF 6 MG/30 ML
0.2 PATIENT CONTROLLED ANALGESIA SYRINGE INTRAVENOUS ONCE
Status: COMPLETED | OUTPATIENT
Start: 2024-01-26 | End: 2024-01-26

## 2024-01-26 RX ORDER — HYDROMORPHONE HCL IN WATER/PF 6 MG/30 ML
0.2 PATIENT CONTROLLED ANALGESIA SYRINGE INTRAVENOUS ONCE AS NEEDED
Status: COMPLETED | OUTPATIENT
Start: 2024-01-26 | End: 2024-01-27

## 2024-01-26 RX ORDER — ACETAMINOPHEN 325 MG/1
975 TABLET ORAL EVERY 8 HOURS SCHEDULED
Status: DISCONTINUED | OUTPATIENT
Start: 2024-01-26 | End: 2024-01-27

## 2024-01-26 RX ADMIN — POTASSIUM CHLORIDE 20 MEQ: 1500 TABLET, EXTENDED RELEASE ORAL at 06:45

## 2024-01-26 RX ADMIN — ONDANSETRON 4 MG: 2 INJECTION INTRAMUSCULAR; INTRAVENOUS at 08:28

## 2024-01-26 RX ADMIN — CEFAZOLIN SODIUM 2000 MG: 2 SOLUTION INTRAVENOUS at 13:20

## 2024-01-26 RX ADMIN — ACETAMINOPHEN 975 MG: 325 TABLET ORAL at 23:35

## 2024-01-26 RX ADMIN — CARVEDILOL 6.25 MG: 6.25 TABLET, FILM COATED ORAL at 17:29

## 2024-01-26 RX ADMIN — ATORVASTATIN CALCIUM 80 MG: 80 TABLET, FILM COATED ORAL at 08:29

## 2024-01-26 RX ADMIN — HEPARIN SODIUM 7500 UNITS: 5000 INJECTION INTRAVENOUS; SUBCUTANEOUS at 21:38

## 2024-01-26 RX ADMIN — ACETAMINOPHEN 975 MG: 325 TABLET ORAL at 17:11

## 2024-01-26 RX ADMIN — CARVEDILOL 6.25 MG: 6.25 TABLET, FILM COATED ORAL at 08:29

## 2024-01-26 RX ADMIN — HEPARIN SODIUM 7500 UNITS: 5000 INJECTION INTRAVENOUS; SUBCUTANEOUS at 06:00

## 2024-01-26 RX ADMIN — INSULIN LISPRO 2 UNITS: 100 INJECTION, SOLUTION INTRAVENOUS; SUBCUTANEOUS at 08:35

## 2024-01-26 RX ADMIN — INSULIN LISPRO 4 UNITS: 100 INJECTION, SOLUTION INTRAVENOUS; SUBCUTANEOUS at 12:07

## 2024-01-26 RX ADMIN — ACETAMINOPHEN 650 MG: 325 TABLET ORAL at 00:28

## 2024-01-26 RX ADMIN — HEPARIN SODIUM 7500 UNITS: 5000 INJECTION INTRAVENOUS; SUBCUTANEOUS at 13:20

## 2024-01-26 RX ADMIN — HYDROMORPHONE HYDROCHLORIDE 0.2 MG: 0.2 INJECTION, SOLUTION INTRAMUSCULAR; INTRAVENOUS; SUBCUTANEOUS at 08:29

## 2024-01-26 RX ADMIN — INSULIN LISPRO 4 UNITS: 100 INJECTION, SOLUTION INTRAVENOUS; SUBCUTANEOUS at 21:39

## 2024-01-26 RX ADMIN — POTASSIUM CHLORIDE 40 MEQ: 1500 TABLET, EXTENDED RELEASE ORAL at 08:28

## 2024-01-26 RX ADMIN — HYDROMORPHONE HYDROCHLORIDE 0.5 MG: 1 INJECTION, SOLUTION INTRAMUSCULAR; INTRAVENOUS; SUBCUTANEOUS at 23:15

## 2024-01-26 RX ADMIN — ZINC SULFATE 220 MG (50 MG) CAPSULE 220 MG: CAPSULE at 08:29

## 2024-01-26 RX ADMIN — CEFAZOLIN SODIUM 2000 MG: 2 SOLUTION INTRAVENOUS at 21:38

## 2024-01-26 RX ADMIN — HYDROMORPHONE HYDROCHLORIDE 0.2 MG: 0.2 INJECTION, SOLUTION INTRAMUSCULAR; INTRAVENOUS; SUBCUTANEOUS at 08:30

## 2024-01-26 RX ADMIN — INSULIN LISPRO 4 UNITS: 100 INJECTION, SOLUTION INTRAVENOUS; SUBCUTANEOUS at 17:10

## 2024-01-26 RX ADMIN — ASPIRIN 81 MG CHEWABLE TABLET 81 MG: 81 TABLET CHEWABLE at 08:29

## 2024-01-26 RX ADMIN — ANTACID TABLETS 1000 MG: 500 TABLET, CHEWABLE ORAL at 06:18

## 2024-01-26 RX ADMIN — HYDROMORPHONE HYDROCHLORIDE 0.2 MG: 0.2 INJECTION, SOLUTION INTRAMUSCULAR; INTRAVENOUS; SUBCUTANEOUS at 02:42

## 2024-01-26 RX ADMIN — SODIUM CHLORIDE 125 ML/HR: 0.9 INJECTION, SOLUTION INTRAVENOUS at 06:08

## 2024-01-26 RX ADMIN — HYDROMORPHONE HYDROCHLORIDE 0.5 MG: 1 INJECTION, SOLUTION INTRAMUSCULAR; INTRAVENOUS; SUBCUTANEOUS at 14:41

## 2024-01-26 RX ADMIN — ACETAMINOPHEN 975 MG: 325 TABLET ORAL at 08:29

## 2024-01-26 RX ADMIN — AMLODIPINE BESYLATE 10 MG: 10 TABLET ORAL at 08:29

## 2024-01-26 RX ADMIN — CEFAZOLIN SODIUM 2000 MG: 2 SOLUTION INTRAVENOUS at 06:05

## 2024-01-26 RX ADMIN — SODIUM CHLORIDE 125 ML/HR: 0.9 INJECTION, SOLUTION INTRAVENOUS at 20:27

## 2024-01-26 NOTE — PHYSICAL THERAPY NOTE
"   PT EVALUATION     01/26/24 1140   PT Last Visit   PT Visit Date 01/26/24   Note Type   Note type Evaluation   Pain Assessment   Pain Assessment Tool 0-10   Pain Score 10 - Worst Possible Pain   Pain Location/Orientation Orientation: Left;Location: Leg;Orientation: Lower   Hospital Pain Intervention(s) Repositioned;Elevated   Multiple Pain Sites No   Restrictions/Precautions   Weight Bearing Precautions Per Order No   Other Precautions Pain   Home Living   Type of Home Apartment   Home Layout Stairs to enter with rails  ([address] and then up two flight to his apartment.)   Bathroom Shower/Tub Tub/shower unit   Additional Comments no DME   Prior Function   Level of [place] Independent with ADLs;Independent with functional mobility;Independent with IADLS   Lives With Son   Receives Help From Family   IADLs Independent with driving;Independent with meal prep;Independent with medication management   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)   General   Additional Pertinent History Pt admitted with Cellulitis of LLE   Family/Caregiver Present No   Cognition   Overall Cognitive Status WFL   Arousal/Participation Cooperative   Orientation Level Oriented X4   Memory Within functional limits   Following Commands Follows all commands and directions without difficulty   Subjective   Subjective Pt states that he will have no difficulty with walking or stairs \"I have to \"  (pt reports having chronic LLE lymphedema at baseline)   RLE Assessment   RLE Assessment WFL   LLE Assessment   LLE Assessment WFL  (limited in knee flexion due to swelling of lower leg.  (pt has chronic lymphedema at baseline))   Bed Mobility   Rolling R 7  Independent   Rolling L 7  Independent   Supine to Sit 7  Independent   Sit to Supine 7  Independent   Transfers   Sit to Stand 7  Independent   Stand to Sit 7  Independent   Ambulation/Elevation   Gait pattern Wide DEJON;Antalgic   Gait Assistance 7  Independent   Assistive Device " None   Distance 3 feet (pt reports that he has been walking to the bathroom independently; RN confirms)   Stair Management Assistance Not tested   Ambulation/Elevation Additional Comments Pt declined walking further, due to not feeling well, generally.  Started his lunch, but has no appettite.  RN aware .   Balance   Static Sitting Good   Dynamic Sitting Good   Static Standing Good   Dynamic Standing Fair +   Ambulatory Fair +  (without AD)   Activity Tolerance   Activity Tolerance Patient limited by pain;Patient limited by fatigue   Nurse Made Aware yes: Maylin   Assessment   Prognosis Good   Problem List Decreased endurance;Pain;Decreased skin integrity;Obesity   Assessment Patient seen for Physical Therapy evaluation. Patient admitted with Sepsis (HCC).  Comorbidities affecting patient's physical performance include: DM, arthitis, CAD> 1 stent in 2012, obesity , MI ,neuropathy. Personal factors affecting patient at time of initial evaluation include: stairs to enter home. Prior to admission, patient was independent with functional mobility without assistive device, independent with ADLS, independent with IADLS, living with son in a single level home with 15 + 12  steps to enter, ambulating household distance, ambulating community distances, and works full time.  Please find objective findings from Physical Therapy assessment regarding body systems outlined above with impairments and limitations including decreased endurance, gait deviations, pain, decreased activity tolerance, and decreased skin integrity.  The Barthel Index was used as a functional outcome tool presenting with a score of Barthel Index Score: 100 today indicating no limitations of functional mobility and ADLS.  Patient's clinical presentation is currently stable  as seen in patient's presentation; limited primarily by pain. Pt  does not require skilled PT for this admission.   As demonstrated by objective findings, the assigned level of complexity  for this evaluation is low.The patient's AM-PAC Basic Mobility Inpatient Short Form Raw Score is 24. A Raw score of greater than 16 suggests the patient may benefit from discharge to home. Please also refer to the recommendation of the Physical Therapist for safe discharge planning.   Goals   Patient Goals to go home, get rid of pain   Plan   Treatment/Interventions Spoke to nursing   PT Frequency Other (Comment)  (no skilled PT needs for this admission)   Discharge Recommendation   Rehab Resource Intensity Level, PT No post-acute rehabilitation needs   Additional Comments Pt is independent within his room and feels confident that he will be able to returnhome to his prior living arrangements.  Pt has been dealing with chronic LLE lymphedema at baseline and will be better once pain is under control.  Will d/c ptfrom PT services at this time.  If any concerns should arise, advised to contact PT at that time.   AM-PAC Basic Mobility Inpatient   Turning in Flat Bed Without Bedrails 4   Lying on Back to Sitting on Edge of Flat Bed Without Bedrails 4   Moving Bed to Chair 4   Standing Up From Chair Using Arms 4   Walk in Room 4   Climb 3-5 Stairs With Railing 4   Basic Mobility Inpatient Raw Score 24   Basic Mobility Standardized Score 57.68   Highest Level Of Mobility   JH-HLM Goal 8: Walk 250 feet or more   JH-HLM Achieved 6: Walk 10 steps or more   Barthel Index   Feeding 10   Bathing 5   Grooming Score 5   Dressing Score 10   Bladder Score 10   Bowels Score 10   Toilet Use Score 10   Transfers (Bed/Chair) Score 15   Mobility (Level Surface) Score 15   Stairs Score 10   Barthel Index Score 100   End of Consult   Patient Position at End of Consult Supine;All needs within reach   End of Consult Comments left leg elevated on two pillows for comfort   Licensure   [place] License Number  Vaishali Vargas PT  [id number]

## 2024-01-26 NOTE — ASSESSMENT & PLAN NOTE
Chronic. Suspect some component of lipo-lymphedema  Pt has B/L Lymphedema in lower extremities, Left worse than right    Plan  Continue to elevate legs  Wear compression stockings  Continue outpatient Lymphedema massages  Follow up with wound care outpatient  Pt needs better glycemic control  Continue Zinc supplementation  Lasix 40 mg IV x 1

## 2024-01-26 NOTE — ASSESSMENT & PLAN NOTE
Patient with a history of cardiac catheterization in 2010 and 2020. Stents placed in [facility] in 2020, 2 total. Follows with cardiology outpatient. Denies any chest pain and shortness of breath on admission.  Home meds include : Aspirin 81 mg daily and Carvedilol 6.25 mg BID    Plan  Continue home meds Aspirin and Coreg with hold parameters

## 2024-01-26 NOTE — ASSESSMENT & PLAN NOTE
Lab Results   Component Value Date    HGBA1C 12.1 (H) 01/26/2024       Recent Labs     01/29/24  1610 01/29/24  2132 01/30/24  0717 01/30/24  1105   POCGLU 141* 185* 99 147*       Chronic, uncontrolled. Home regimen includes Metformin 1000 mg BID. Not taking Victoza as he cannot afford the medication. Historically tried glimepiride, wishes to try again.     Plan  A1c 12.1; patient needs better glycemic control  Resume home metformin  Basal insulin 12U  Start glimepiride 2 mg daily  DC  meal time insulin  Continue sliding scale  Hypoglycemia protocol  Carbohydrate controlled diet  Monitor blood sugars closely

## 2024-01-26 NOTE — ASSESSMENT & PLAN NOTE
Acute    Presented with erythema, edema, warmth, and tenderness of left lower extremity, after trauma. Patient reports he dropped a couch on his left ankle 3 days ago while moving furniture.    Labs on admission showed leukocytosis, fever, and tachycardia. Received Vancomycin 2g IV in the ED and Toradol 15 mg IV  Of note patient has had multiple admissions in the past for cellulitis and also has history of chronic lymphedema.     1/26: Patient reports pain is still poorly controlled. Received Toradol, Tylenol, and Dilaudid 0.5 mg x 1. Will optimize pain regimen with regards to poor renal function.  1/27 : Blood cultures negative    Plan  Ancef 2 grams Q 8H x 5 days  Keflex 500 mg BID for total of 10 days  X-ray left ankle and Doppler LLE negative  Elevation of the affected limb  Continue wound care   Continue pain control

## 2024-01-26 NOTE — ED PROVIDER NOTES
"History  Chief Complaint   Patient presents with    Fever     States he dropped a couch on his left leg about 4 days ago and \" 2 days later I became sick as a dog \". Patient is diabetic and ran out meds a few weeks ago. He is diaphoretic. Fever of 101.4    Wound Infection - Complicated     43-year-old male presents the ED states about 4 days ago he dropped a couch on his left ankle causing a abrasion to the area.  Area has scabbed over however now he has history of lymphedema and that leg to begin with and now is having areas of erythema in that leg as well as fever to 101.4 .  Patient awake and alert states he does have a lot of pain in that leg due to the increased swelling over and above what he normally has as well as the erythema to that area        Prior to Admission Medications   Prescriptions Last Dose Informant Patient Reported? Taking?   Adalimumab (Humira Pen) 80 MG/0.8ML PNKT   No No   Sig: Inject 80 mg on week 6 then every other week there after for maintenance dose.   EPINEPHrine (EPIPEN) 0.3 mg/0.3 mL SOAJ   No No   Sig: Inject 0.3 mL (0.3 mg total) into a muscle once for 1 dose   Insulin Pen Needle (BD ULTRA-FINE PEN NEEDLES) 29G X 12.7MM MISC   No No   Sig: USE 1 NEEDLE ONCE DAILY FOR INJECTION UNDER THE SKIN   Victoza injection   No No   Sig: INJECT 0.6MG UNDER THE SKIN DAILY FOR A WEEK, THEN INCREASE TO 1.2MG DAILY ON THE SECOND WEEK, THEN INCREASE TO 1.8MG DAILY ON THE THIRD WEEK AND GOING FORWARD   amLODIPine (NORVASC) 10 mg tablet   No No   Sig: Take 1 tablet (10 mg total) by mouth daily   aspirin (ECOTRIN LOW STRENGTH) 81 mg EC tablet   No No   Sig: Take 1 tablet (81 mg total) by mouth daily   atorvastatin (LIPITOR) 80 mg tablet   No No   Sig: Take 1 tablet (80 mg total) by mouth daily   carvedilol (COREG) 6.25 mg tablet   No No   Sig: TAKE ONE TABLET BY MOUTH TWICE A DAY WITH MEALS   lisinopril (ZESTRIL) 20 mg tablet   No No   Sig: Take 1 tablet (20 mg total) by mouth daily   metFORMIN " "(GLUCOPHAGE) 1000 MG tablet   No No   Sig: Take 1 tablet (1,000 mg total) by mouth 2 (two) times a day with meals   zinc gluconate 50 mg tablet   No No   Sig: Take 1 tablet (50 mg total) by mouth daily   zinc sulfate (ZINCATE) 220 mg capsule   No No   Sig: Take 1 capsule (220 mg total) by mouth daily      Facility-Administered Medications: None       Past Medical History:   Diagnosis Date    Acute hypoxic respiratory failure ([facility]) 10/7/2023    Arthritis     Breathing difficulty     Coronary artery disease     Diabetes mellitus ([facility])     Diabetic neuropathy ([facility]) 5/28/2021    Heart disease     CAD   s/p ptca with 1 stent 2012    Hidradenitis suppurativa     groin    History of transfusion     Hyperlipidemia     Hypertension     MI (myocardial infarction) ([facility])     \" silent \" M.I. in the past    Morbid obesity with BMI of 50.0-59.9, adult ([facility])     Nicotine dependence     Obesity     Pilonidal cyst     Type 1 non-ST elevation myocardial infarction (NSTEMI) ([facility]) 11/29/2020       Past Surgical History:   Procedure Laterality Date    CARDIAC SURGERY      CORONARY ANGIOPLASTY WITH STENT PLACEMENT      INCISION AND DRAINAGE OF WOUND N/A 1/24/2017    Procedure: INCISION AND DRAINAGE (I&D) BUTTOCK, PILONIDAL CYST;  Surgeon: Simba Adhikari MD;  Location: [facility];  Service:     LEG SURGERY Left     I&D of left leg 2012    MO DEBRIDEMENT SUBCUTANEOUS TISSUE 1ST 20 SQ CM/< Left 7/6/2021    Procedure: DEBRIDEMENT WOUND (WASH OUT);  Surgeon: Sudheer Mcfarlane MD;  Location: [facility];  Service: General    MO EXC B9 LESION MRGN XCP SK TG T/A/L >4.0 CM Left 4/6/2021    Procedure: EXCISION THIGH MASS;  Surgeon: Sudheer Mcfarlane MD;  [facility];  Service: General    MO EXCISION H/P/P/U COMPLEX REPAIR Left 7/6/2021    Procedure: EXCISION PERINEAL ABSCESS LEFT THIGH;  Surgeon: Sudheer Mcfarlane MD;  [facility];  Service: General    MO NEGATIVE PRESSURE WOUND THERAPY DME <= 50 SQ CM Left 4/6/2021    Procedure: APPLICATION VAC DRESSING " THIGH;  Surgeon: Sudheer Mcfarlane MD;  [facility];  Service: General    WOUND DEBRIDEMENT Left 2021    Procedure: DEBRIDEMENT WOUND AND DRESSING CHANGE (WASH OUT);  Surgeon: Mahin Traore DO;  [facility];  Service: General    WOUND DEBRIDEMENT Left 2021    Procedure: DEBRIDEMENT LOWER EXTREMITY (WASH OUT), left groin and thigh;  Surgeon: Sudheer Mcfarlane MD;  [facility];  Service: General    WOUND DEBRIDEMENT Left 2021    Procedure: DEBRIDEMENT LOWER EXTREMITY (WASH OUT);  Surgeon: Zka Castanon DO;  [facility];  Service: General    WOUND DEBRIDEMENT Left 2021    Procedure: Washout left thigh wound and closure;  Surgeon: Amor Jaramillo DO;  [facility];  Service: General       Family History   Problem Relation Age of Onset    Heart disease Father     Diabetes Father     Hypertension Mother     Heart disease Mother      I have reviewed and agree with the history as documented.    E-Cigarette/Vaping    E-Cigarette Use Never User      E-Cigarette/Vaping Substances    Nicotine No     THC No     CBD No     Flavoring No      Social History     Tobacco Use    Smoking status: Former     Current packs/day: 0.00     Average packs/day: 1.5 packs/day for 20.0 years (29.9 ttl pk-yrs)     Types: Cigarettes     Start date: 2021     Quit date: 2021     Years since quittin.9    Smokeless tobacco: Never   Vaping Use    Vaping status: Never Used   Substance Use Topics    Alcohol use: Not Currently     Comment: rarely    Drug use: No       Review of Systems   Constitutional:  Positive for fever. Negative for activity change, chills and diaphoresis.   HENT:  Negative for congestion, ear pain, nosebleeds, sore throat, trouble swallowing and voice change.    Eyes:  Negative for pain, discharge and redness.   Respiratory:  Negative for apnea, cough, choking, shortness of breath, wheezing and stridor.    Cardiovascular:  Negative for chest pain and palpitations.    Gastrointestinal:  Negative for abdominal distention, abdominal pain, constipation, diarrhea, nausea and vomiting.   Endocrine: Negative for polydipsia.   Genitourinary:  Negative for difficulty urinating, dysuria, flank pain, frequency, hematuria and urgency.   Musculoskeletal:  Positive for gait problem. Negative for back pain, joint swelling, myalgias, neck pain and neck stiffness.   Skin:  Positive for color change and wound. Negative for pallor and rash.   Neurological:  Negative for dizziness, tremors, syncope, speech difficulty, weakness, numbness and headaches.   Hematological:  Negative for adenopathy.   Psychiatric/Behavioral:  Negative for confusion, hallucinations, self-injury and suicidal ideas. The patient is not nervous/anxious.        Physical Exam  Physical Exam  Vitals and nursing note reviewed.   Constitutional:       General: He is not in acute distress.     Appearance: He is well-developed. He is not diaphoretic.   HENT:      Head: Normocephalic and atraumatic.      Right Ear: External ear normal.      Left Ear: External ear normal.      Nose: Nose normal.   Eyes:      Conjunctiva/sclera: Conjunctivae normal.      Pupils: Pupils are equal, round, and reactive to light.   Cardiovascular:      Rate and Rhythm: Normal rate and regular rhythm.      Heart sounds: Normal heart sounds.   Pulmonary:      Effort: Pulmonary effort is normal.      Breath sounds: Normal breath sounds.   Abdominal:      General: Bowel sounds are normal.      Palpations: Abdomen is soft.      Tenderness: There is abdominal tenderness.      Comments: Diffuse tenderness   Musculoskeletal:         General: Swelling, tenderness and signs of injury present. Normal range of motion.      Cervical back: Normal range of motion and neck supple.      Comments: Area around the abrasion is erythematous all awake up to mid thigh on his leg.  Also very warm to the touch.   Skin:     General: Skin is warm and dry.   Neurological:       Mental Status: He is alert and oriented to person, place, and time.      Deep Tendon Reflexes: Reflexes are normal and symmetric.   Psychiatric:         Behavior: Behavior is cooperative.         Vital Signs  ED Triage Vitals [01/25/24 1851]   Temperature Pulse Respirations Blood Pressure SpO2   (!) 101.4 °F (38.6 °C) 97 20 122/55 93 %      Temp Source Heart Rate Source Patient Position - Orthostatic VS BP Location FiO2 (%)   Tympanic Monitor Sitting Left arm --      Pain Score       10 - Worst Possible Pain           Vitals:    01/29/24 1738 01/29/24 1952 01/29/24 2254 01/30/24 0819   BP: 141/79 136/76 123/58 152/84   Pulse: 77 70 65 77   Patient Position - Orthostatic VS:  Lying           Visual Acuity      ED Medications  Medications   aspirin chewable tablet 81 mg (81 mg Oral Given 1/30/24 0818)   atorvastatin (LIPITOR) tablet 80 mg (80 mg Oral Given 1/30/24 0818)   carvedilol (COREG) tablet 6.25 mg (6.25 mg Oral Given 1/30/24 0818)   zinc sulfate (ZINCATE) capsule 220 mg (220 mg Oral Given 1/30/24 0818)   heparin (porcine) subcutaneous injection 7,500 Units (7,500 Units Subcutaneous Given 1/30/24 0516)   ondansetron (ZOFRAN) injection 4 mg (4 mg Intravenous Given 1/26/24 0828)   calcium carbonate (TUMS) chewable tablet 1,000 mg (1,000 mg Oral Given 1/26/24 0618)   ceFAZolin (ANCEF) IVPB (premix in dextrose) 2,000 mg 50 mL (2,000 mg Intravenous New Bag 1/30/24 1009)   amLODIPine (NORVASC) tablet 10 mg (10 mg Oral Given 1/30/24 0818)   insulin lispro (HumaLOG) 100 units/mL subcutaneous injection 2-12 Units ( Subcutaneous Not Given 1/30/24 1236)   insulin lispro (HumaLOG) 100 units/mL subcutaneous injection 1-5 Units (1 Units Subcutaneous Given 1/29/24 2204)   metFORMIN (GLUCOPHAGE) tablet 1,000 mg (1,000 mg Oral Given 1/30/24 0818)   glimepiride (AMARYL) tablet 2 mg (2 mg Oral Given 1/30/24 0818)   polyethylene glycol (MIRALAX) packet 17 g (17 g Oral Not Given 1/30/24 0818)   acetaminophen (TYLENOL) tablet 975 mg  (975 mg Oral Given 1/30/24 1009)   oxyCODONE (ROXICODONE) immediate release tablet 10 mg (10 mg Oral Given 1/30/24 1009)   insulin glargine (LANTUS) subcutaneous injection 12 Units 0.12 mL (has no administration in time range)   sodium chloride 0.9 % bolus 500 mL (0 mL Intravenous Stopped 1/26/24 0837)   ketorolac (TORADOL) injection 15 mg (15 mg Intravenous Given 1/25/24 1949)   vancomycin (VANCOCIN) 2,000 mg in sodium chloride 0.9 % 500 mL IVPB (0 mg Intravenous Stopped 1/26/24 0837)   acetaminophen (TYLENOL) tablet 975 mg (975 mg Oral Given 1/25/24 1948)   ondansetron (ZOFRAN) injection 4 mg (4 mg Intravenous Given 1/25/24 1958)   HYDROmorphone (DILAUDID) injection 0.5 mg (0.5 mg Intravenous Given 1/25/24 2200)   potassium chloride (Klor-Con M20) CR tablet 40 mEq (40 mEq Oral Given 1/26/24 0828)     Followed by   potassium chloride (Klor-Con M20) CR tablet 20 mEq (20 mEq Oral Given 1/26/24 0645)   HYDROmorphone HCl (DILAUDID) injection 0.2 mg (0.2 mg Intravenous Given 1/26/24 0830)   HYDROmorphone HCl (DILAUDID) injection 0.2 mg (0.2 mg Intravenous Given 1/27/24 0417)   magnesium sulfate 2 g/50 mL IVPB (premix) 2 g (0 g Intravenous Stopped 1/28/24 0700)   potassium chloride (Klor-Con M20) CR tablet 40 mEq (40 mEq Oral Given 1/27/24 1046)   furosemide (LASIX) injection 40 mg (40 mg Intravenous Given 1/29/24 1222)   magnesium sulfate 2 g/50 mL IVPB (premix) 2 g (2 g Intravenous New Bag 1/30/24 1009)       Diagnostic Studies  Results Reviewed       Procedure Component Value Units Date/Time    Blood culture #1 [[id number]] Collected: 01/25/24 1934    Lab Status: Preliminary result Specimen: Blood from Arm, Right Updated: 01/30/24 0001     Blood Culture No Growth After 4 Days.    Blood culture #2 [[id number]] Collected: 01/25/24 1934    Lab Status: Preliminary result Specimen: Blood from Arm, Left Updated: 01/30/24 0001     Blood Culture No Growth After 4 Days.    Urine culture [[id number]] Collected: 01/26/24 0841     Lab Status: Final result Specimen: Urine, Clean Catch Updated: 01/27/24 0906     Urine Culture No Growth <1000 cfu/mL    UA (URINE) with reflex to Scope [[id number]]  (Abnormal) Collected: 01/26/24 0841    Lab Status: Edited Result - FINAL Specimen: Urine, Clean Catch Updated: 01/26/24 0944     Color, UA Dark Anais     Clarity, UA Turbid     Specific Gravity, UA 1.025     pH, UA 5.5     Leukocytes, UA Negative     Nitrite, UA Negative     Protein,  (2+) mg/dl      Glucose,  (1/10%) mg/dl      Ketones, UA Trace mg/dl      Urobilinogen, UA 1.0 E.U./dl      Bilirubin, UA Small     Occult Blood, UA Large    Narrative:      Called er to Autumn Tobias    Lactic acid, plasma (w/reflex if result > 2.0) [[id number]]  (Normal) Collected: 01/25/24 2006    Lab Status: Final result Specimen: Blood from Hand, Left Updated: 01/25/24 2030     LACTIC ACID 1.0 mmol/L     Narrative:      Result may be elevated if tourniquet was used during collection.    FLU/RSV/COVID - if FLU/RSV clinically relevant [[id number]]  (Normal) Collected: 01/25/24 1934    Lab Status: Final result Specimen: Nares from Nose Updated: 01/25/24 2016     SARS-CoV-2 Negative     INFLUENZA A PCR Negative     INFLUENZA B PCR Negative     RSV PCR Negative    Narrative:      FOR PEDIATRIC PATIENTS - copy/paste COVID Guidelines URL to browser: [email or web address]    SARS-CoV-2 assay is a Nucleic Acid Amplification assay intended for the  qualitative detection of nucleic acid from SARS-CoV-2 in nasopharyngeal  swabs. Results are for the presumptive identification of SARS-CoV-2 RNA.    Positive results are indicative of infection with SARS-CoV-2, the virus  causing COVID-19, but do not rule out bacterial infection or co-infection  with other viruses. Laboratories within the [place] and its  territories are required to report all positive results to the appropriate  public health authorities. Negative  results do not preclude SARS-CoV-2  infection and should not be used as the sole basis for treatment or other  patient management decisions. Negative results must be combined with  clinical observations, patient history, and epidemiological information.  This test has not been FDA cleared or approved.    This test has been authorized by FDA under an Emergency Use Authorization  (EUA). This test is only authorized for the duration of time the  declaration that circumstances exist justifying the authorization of the  emergency use of an in vitro diagnostic tests for detection of SARS-CoV-2  virus and/or diagnosis of COVID-19 infection under section 564(b)(1) of  the Act, 21 U.S.C. 360bbb-3(b)(1), unless the authorization is terminated  or revoked sooner. The test has been validated but independent review by FDA  and CLIA is pending.    Test performed using [employer]: This RT-PCR assay targets N2,  a region unique to SARS-CoV-2. A conserved region in the E-gene was chosen  for pan-Sarbecovirus detection which includes SARS-CoV-2.    According to CMS-2020-01-R, this platform meets the definition of high-throughput technology.    Comprehensive metabolic panel [[id number]]  (Abnormal) Collected: 01/25/24 1934    Lab Status: Final result Specimen: Blood from Hand, Left Updated: 01/25/24 2002     Sodium 127 mmol/L      Potassium 4.8 mmol/L      Chloride 93 mmol/L      CO2 25 mmol/L      ANION GAP 9 mmol/L      BUN 28 mg/dL      Creatinine 1.59 mg/dL      Glucose 179 mg/dL      Calcium 7.9 mg/dL      Corrected Calcium 8.7 mg/dL      AST 28 U/L      ALT 12 U/L      Alkaline Phosphatase 73 U/L      Total Protein 7.7 g/dL      Albumin 3.0 g/dL      Total Bilirubin 0.50 mg/dL      eGFR 52 ml/min/1.73sq m     Narrative:      National Kidney Disease Foundation guidelines for Chronic Kidney Disease (CKD):     Stage 1 with normal or high GFR (GFR > 90 mL/min/1.73 square meters)    Stage 2 Mild CKD (GFR = 60-89 mL/min/1.73  square meters)    Stage 3A Moderate CKD (GFR = 45-59 mL/min/1.73 square meters)    Stage 3B Moderate CKD (GFR = 30-44 mL/min/1.73 square meters)    Stage 4 Severe CKD (GFR = 15-29 mL/min/1.73 square meters)    Stage 5 End Stage CKD (GFR <15 mL/min/1.73 square meters)  Note: GFR calculation is accurate only with a steady state creatinine    CBC and differential [[id number]]  (Abnormal) Collected: 01/25/24 1934    Lab Status: Final result Specimen: Blood from Hand, Left Updated: 01/25/24 1940     WBC 14.82 Thousand/uL      RBC 4.51 Million/uL      Hemoglobin 12.5 g/dL      Hematocrit 37.5 %      MCV 83 fL      MCH 27.7 pg      MCHC 33.3 g/dL      RDW 13.0 %      MPV 9.5 fL      Platelets 274 Thousands/uL      nRBC 0 /100 WBCs      Neutrophils Relative 88 %      Immat GRANS % 1 %      Lymphocytes Relative 5 %      Monocytes Relative 6 %      Eosinophils Relative 0 %      Basophils Relative 0 %      Neutrophils Absolute 13.13 Thousands/µL      Immature Grans Absolute 0.12 Thousand/uL      Lymphocytes Absolute 0.70 Thousands/µL      Monocytes Absolute 0.81 Thousand/µL      Eosinophils Absolute 0.01 Thousand/µL      Basophils Absolute 0.05 Thousands/µL     Fingerstick Glucose (POCT) [[id number]]  (Abnormal) Collected: 01/25/24 1933    Lab Status: Final result Updated: 01/25/24 1934     POC Glucose 178 mg/dl                    XR ankle 3+ vw left   Final Result by Crystal Cardozo MD (01/26 1136)      VAS VENOUS DUPLEX -LOWER LIMB UNILATERAL   Final Result by Dena Bartlett DO (01/26 1151)                 Procedures  Procedures         ED Course                               SBIRT 22yo+      Flowsheet Row Most Recent Value   Initial Alcohol Screen: US AUDIT-C     1. How often do you have a drink containing alcohol? 0 Filed at: 01/25/2024 1853   Audit-C Score 0 Filed at: 01/25/2024 1853   ALEX: How many times in the past year have you...    Used an illegal drug or used a prescription medication for non-medical reasons?  Never Filed at: 01/25/2024 1853                      Medical Decision Making  Abrasion to the ankle cellulitis versus increased lymphedema.  With patient's white blood cell count being up and fevers and erythema in the extremity most likely is infectious at this point we will have the patient admitted    Amount and/or Complexity of Data Reviewed  Labs: ordered.    Risk  OTC drugs.  Prescription drug management.  Decision regarding hospitalization.             Disposition  Final diagnoses:   Cellulitis of left lower extremity     Time reflects when diagnosis was documented in both MDM as applicable and the Disposition within this note       Time User Action Codes Description Comment    1/25/2024  7:57 PM Mo Guzman Add [L03.116] Cellulitis of left lower extremity           ED Disposition       ED Disposition   Admit    Condition   Stable    Date/Time   Thu Jan 25, 2024 1958    Comment   Case was discussed with fabiano and the patient's admission status was agreed to be Admission Status: observation status to the service of Dr. Waterman .               Follow-up Information    None         Current Discharge Medication List        CONTINUE these medications which have NOT CHANGED    Details   amLODIPine (NORVASC) 10 mg tablet Take 1 tablet (10 mg total) by mouth daily  Qty: 90 tablet, Refills: 1    Associated Diagnoses: Essential hypertension      Adalimumab (Humira Pen) 80 MG/0.8ML PNKT Inject 80 mg on week 6 then every other week there after for maintenance dose.  Qty: 2 each, Refills: 10    Associated Diagnoses: Hidradenitis suppurativa      aspirin (ECOTRIN LOW STRENGTH) 81 mg EC tablet Take 1 tablet (81 mg total) by mouth daily  Qty: 30 tablet, Refills: 0    Associated Diagnoses: CAD (coronary artery disease)      atorvastatin (LIPITOR) 80 mg tablet Take 1 tablet (80 mg total) by mouth daily  Qty: 90 tablet, Refills: 1    Associated Diagnoses: Dyslipidemia      carvedilol (COREG) 6.25 mg tablet TAKE ONE TABLET  BY MOUTH TWICE A DAY WITH MEALS  Qty: 60 tablet, Refills: 0    Associated Diagnoses: Essential hypertension      EPINEPHrine (EPIPEN) 0.3 mg/0.3 mL SOAJ Inject 0.3 mL (0.3 mg total) into a muscle once for 1 dose  Qty: 0.6 mL, Refills: 0    Associated Diagnoses: Allergic reaction, initial encounter      Insulin Pen Needle (BD ULTRA-FINE PEN NEEDLES) 29G X 12.7MM MISC USE 1 NEEDLE ONCE DAILY FOR INJECTION UNDER THE SKIN  Qty: 50 each, Refills: 3    Associated Diagnoses: DM type 2 with diabetic peripheral neuropathy (HCC)      lisinopril (ZESTRIL) 20 mg tablet Take 1 tablet (20 mg total) by mouth daily  Qty: 90 tablet, Refills: 1    Associated Diagnoses: Essential hypertension      metFORMIN (GLUCOPHAGE) 1000 MG tablet Take 1 tablet (1,000 mg total) by mouth 2 (two) times a day with meals  Qty: 180 tablet, Refills: 1    Associated Diagnoses: Type 2 diabetes mellitus with hyperglycemia, without long-term current use of insulin (HCC)      Victoza injection INJECT 0.6MG UNDER THE SKIN DAILY FOR A WEEK, THEN INCREASE TO 1.2MG DAILY ON THE SECOND WEEK, THEN INCREASE TO 1.8MG DAILY ON THE THIRD WEEK AND GOING FORWARD  Qty: 6 mL, Refills: 3    Associated Diagnoses: DM type 2 with diabetic peripheral neuropathy (HCC); Morbid obesity (HCC)      zinc gluconate 50 mg tablet Take 1 tablet (50 mg total) by mouth daily  Qty: 30 tablet, Refills: 1    Associated Diagnoses: Cellulitis      zinc sulfate (ZINCATE) 220 mg capsule Take 1 capsule (220 mg total) by mouth daily  Qty: 30 capsule, Refills: 0    Associated Diagnoses: Hidradenitis suppurativa             No discharge procedures on file.    PDMP Review         Value Time User    PDMP Reviewed  Yes 4/29/2021 10:52 AM Joaquin Garvey PA-C            ED Provider  Electronically Signed by             Mo Guzman DO  01/25/24 2021       Mo Guzman,   01/30/24 7493

## 2024-01-26 NOTE — ASSESSMENT & PLAN NOTE
Present on admission as evidenced by fever, tachycardia, and tachypnea associated with leukocytosis. Likely secondary to cellulitis of left lower extremity. LA wnl. Procal and CRP elevated.     ER Course: Vancomycin 2g IV, Toradol 15 mg IV, Zofran 4 mg IV and 500 mL NS bolus    1/26: Leukocytosis resolved. Cultures pending. No additional fevers or discharge from wound. Pain still poorly controlled, will optimize and evaluate for other sources of pain. ProCal 4.81--> 2.66  1/27 : Blood cultures negative; patient still complains of pain in LLE  UA shows neg leuc and nitrites, trace ketones, blood. Ucx negative    BC negative    Plan  Stop Ancef 2 gm IV   Keflex 500 mg BID for to 10 days  Monitor hemodynamics  IVF discontinued

## 2024-01-26 NOTE — ASSESSMENT & PLAN NOTE
Chronic, stable. SBP on admission 110-120.     Plan  Hold Lisinopril due to CLOVIS  Continue Coreg  Stop amlodipine  Start losartan 25 mg instead  Monitor BP and vitals daily

## 2024-01-26 NOTE — CASE MANAGEMENT
Case Management Assessment & Discharge Planning Note    Patient name Joaquin Frankel  Location 2 [facility] 209/2 [facility] 209 MRN [id number]  : 1980 Date 2024       Current Admission Date: 2024  Current Admission Diagnosis:Sepsis (HCC)   Patient Active Problem List    Diagnosis Date Noted    Cellulitis 10/07/2023    Long term (current) use of immunomodulator 2023    Lymphedema of both lower extremities 06/15/2022    History of coronary angioplasty with insertion of stent 2022    Colitis 2022    Sepsis (HCC) 2022    Abscess of multiple sites of buttock 2022    Anemia 2022    History of hidradenitis suppurativa 2022    Nocturnal hypoxia 2021    Cellulitis of left lower extremity 2021    H/O drainage of abscess 2021    Morbid obesity with body mass index (BMI) of 50.0 to 59.9 in adult ([facility]) 03/10/2021    Hyponatremia 2021    CLOVIS (acute kidney injury) ([facility]) 2021    Type 2 diabetes mellitus with diabetic polyneuropathy, without long-term current use of insulin ([facility]) 2018    Dyslipidemia 2017    Essential hypertension 2016    Coronary artery disease 2014      LOS (days): 0  Geometric Mean LOS (GMLOS) (days):   Days to GMLOS:     OBJECTIVE:     Current admission status: Observation    Preferred Pharmacy:   [id number] - [address] [address]  Phone: [phone] Fax: [phone]    [facility] - [place], [place] - [address], [facility] [address]  [address] [facility] [address]  [facility] [address]  Phone: [phone] Fax: [phone]    Primary Care Provider: Luc Casas MD    Primary Insurance: BLUE CROSS  Secondary Insurance:     ASSESSMENT:  Active Health Care Proxies    There are no active Health Care Proxies on file.       Advance Directives  Does patient have a Health Care POA?: No  Does patient currently have  a Health Care decision maker?: Yes, please see Health Care Proxy section  Does patient have Advance Directives?: No  Primary Contact: Carl Frankel    Readmission Root Cause  30 Day Readmission: No    Patient Information  Admitted from:: Home  Mental Status: Alert  During Assessment patient was accompanied by: Not accompanied during assessment  Assessment information provided by:: Patient  Primary Caregiver: Self  Support Systems: Self, Son, Parent  County of Residence: [place]  What city do you live in?: [place]  Home entry access options. Select all that apply.: Stairs  Number of steps to enter home.:  (Two flights)  Do the steps have railings?: Yes  Type of Current Residence: Apartment  Floor Level: 2  Upon entering residence, is there a bedroom on the main floor (no further steps)?: No  A bedroom is located on the following floor levels of residence (select all that apply):: 3rd Floor  Upon entering residence, is there a bathroom on the main floor (no further steps)?: Yes  Number of steps to 3rd floor from main floor: One Flight  Living Arrangements:  (son is 20 years old)    Activities of Daily Living Prior to Admission  Functional Status: Independent  Completes ADLs independently?: Yes  Ambulates independently?: Yes  Does patient use assisted devices?: No  Does patient currently own DME?: No  Does patient have a history of Outpatient Therapy (PT/OT)?: No  Does the patient have a history of Short-Term Rehab?: No  Does patient have a history of HHC?: No  Does patient currently have HHC?: No    Patient Information Continued  Income Source: Employed  Does patient have prescription coverage?: Yes (however his new coverage from new year does not cover some of his medications including insulin, has MD appt Monday to address)  Does patient receive dialysis treatments?: No  Does patient have a history of substance abuse?: No  Does patient have a history of Mental Health Diagnosis?: No    Means of  Transportation  Means of Transport to Appts:: Drives Self      Housing Stability: Low Risk  (1/26/2024)    Housing Stability Vital Sign     Unable to Pay for Housing in the Last Year: No     Number of Places Lived in the Last Year: 1     Unstable Housing in the Last Year: No   Food Insecurity: No Food Insecurity (1/26/2024)    Hunger Vital Sign     Worried About Running Out of Food in the Last Year: Never true     Ran Out of Food in the Last Year: Never true   Transportation Needs: No Transportation Needs (1/26/2024)    PRAPARE - Transportation     Lack of Transportation (Medical): No     Lack of Transportation (Non-Medical): No   Utilities: Not At Risk (1/26/2024)    [facility] Utilities     Threatened with loss of utilities: No       DISCHARGE DETAILS:    Discharge planning discussed with:: Patient  Freedom of Choice: Yes  Comments - Freedom of Choice: SW met with pt to assess needs and discuss plans.  Pt's plan is to return home with his son when discharged.  No discharge needs anticipated by pt at this time.  Pt does have appointment on Monday to address prescription coverage issue with PCP.  SW offered assistance in future if needed.  Will follow to monitor progress.  CM contacted family/caregiver?: No- see comments (per pt he would call father if needed)    Requested Home Health Care         Is the patient interested in HHC at discharge?: No    DME Referral Provided  Referral made for DME?: No    Other Referral/Resources/Interventions Provided:  Interventions: None Indicated    Treatment Team Recommendation: Home  Discharge Destination Plan:: Home  Transport at Discharge : Family

## 2024-01-26 NOTE — ASSESSMENT & PLAN NOTE
Acute    Cr 1.59 on admission (Baseline 1-1.2). Likey prerenal CLOVIS in the setting of vomiting and diarrhea about 2 days prior to admission  No prior history of CKD.    1/26: Cr 1.66, GFR 50. Of note, did receive IV toradol on admission. Will avoid NSAID's and other nephrotoxins.  1/27: Cr: 1.25    Plan  Monitor BMP daily  Hold nephrotoxic medications including lisinopril and metformin  Strict input and output monitoring  Avoid hypotension

## 2024-01-26 NOTE — PLAN OF CARE
Problem: PAIN - ADULT  Goal: Verbalizes/displays adequate comfort level or baseline comfort level  Description: Interventions:  - Encourage patient to monitor pain and request assistance  - Assess pain using appropriate pain scale  - Administer analgesics based on type and severity of pain and evaluate response  - Implement non-pharmacological measures as appropriate and evaluate response  - Consider cultural and social influences on pain and pain management  - Notify physician/advanced practitioner if interventions unsuccessful or patient reports new pain  Outcome: Progressing     Problem: INFECTION - ADULT  Goal: Absence or prevention of progression during hospitalization  Description: INTERVENTIONS:  - Assess and monitor for signs and symptoms of infection  - Monitor lab/diagnostic results  - Monitor all insertion sites, i.e. indwelling lines, tubes, and drains  - Monitor endotracheal if appropriate and nasal secretions for changes in amount and color  - [place] appropriate cooling/warming therapies per order  - Administer medications as ordered  - Instruct and encourage patient and family to use good hand hygiene technique  - Identify and instruct in appropriate isolation precautions for identified infection/condition  Outcome: Progressing  Goal: Absence of fever/infection during neutropenic period  Description: INTERVENTIONS:  - Monitor WBC    Outcome: Progressing     Problem: SAFETY ADULT  Goal: Patient will remain free of falls  Description: INTERVENTIONS:  - Educate patient/family on patient safety including physical limitations  - Instruct patient to call for assistance with activity   - Consult OT/PT to assist with strengthening/mobility   - Keep Call bell within reach  - Keep bed low and locked with side rails adjusted as appropriate  - Keep care items and personal belongings within reach  - Initiate and maintain comfort rounds  - MOutcome: Progressing  Goal: Maintain or return to baseline ADL  function  Description: INTERVENTIONS:  -  Assess patient's ability to carry out ADLs; assess patient's baseline for ADL function and identify physical deficits which impact ability to perform ADLs (bathing, care of mouth/teeth, toileting, grooming, dressing, etc.)  - Assess/evaluate cause of self-care deficits   - Assess range of motion  - Assess patient's mobility; develop plan if impaired  - Assess patient's need for assistive devices and provide as appropriate  - Encourage maximum independence but intervene and supervise when necessary  - Involve family in performance of ADLs  - Assess for home care needs following discharge   - Consider OT consult to assist with ADL evaluation and planning for discharge  - Provide patient education as appropriate  Outcome: Progressing     Problem: DISCHARGE PLANNING  Goal: Discharge to home or other facility with appropriate resources  Description: INTERVENTIONS:  - Identify barriers to discharge w/patient and caregiver  - Arrange for needed discharge resources and transportation as appropriate  - Identify discharge learning needs (meds, wound care, etc.)  - Arrange for interpretive services to assist at discharge as needed  - Refer to Case Management Department for coordinating discharge planning if the patient needs post-hospital services based on physician/advanced practitioner order or complex needs related to functional status, cognitive ability, or social support system  Outcome: Progressing     Problem: Knowledge Deficit  Goal: Patient/family/caregiver demonstrates understanding of disease process, treatment plan, medications, and discharge instructions  Description: Complete learning assessment and assess knowledge base.  Interventions:  - Provide teaching at level of understanding  - Provide teaching via preferred learning methods  Outcome: Progressing

## 2024-01-26 NOTE — OCCUPATIONAL THERAPY NOTE
OT EVALUATION       01/26/24 1129   Note Type   Note type Screen   Additional Comments Patient is independent with ADLS per PT, no skilled OT needs required at this time.   Licensure   [place] License Number  Liliya Kelley MS OTR/L [id number]

## 2024-01-26 NOTE — ASSESSMENT & PLAN NOTE
Chronic, stable.   Patient follows with wound care for hidradenitis suppurativa. He reports he did not take Humira as he could not afford it. On examination, patient has induration, scarring and wounds present present in groin and abdominal pannus.  No discharge from wounds. Reports he will not see wound care outpatient as it disrupts his work schedule.     Plan  Follow up with wound care

## 2024-01-26 NOTE — ED NOTES
Provider at bedside to eval for admission, aware of pts c/o 10/10 LLE pain      Pamela Reyes, RN  01/25/24 2038

## 2024-01-26 NOTE — PLAN OF CARE
Problem: PAIN - ADULT  Goal: Verbalizes/displays adequate comfort level or baseline comfort level  Description: Interventions:  - Encourage patient to monitor pain and request assistance  - Assess pain using appropriate pain scale  - Administer analgesics based on type and severity of pain and evaluate response  - Implement non-pharmacological measures as appropriate and evaluate response  - Consider cultural and social influences on pain and pain management  - Notify physician/advanced practitioner if interventions unsuccessful or patient reports new pain  Outcome: Progressing     Problem: INFECTION - ADULT  Goal: Absence or prevention of progression during hospitalization  Description: INTERVENTIONS:  - Assess and monitor for signs and symptoms of infection  - Monitor lab/diagnostic results  - Monitor all insertion sites, i.e. indwelling lines, tubes, and drains  - Monitor endotracheal if appropriate and nasal secretions for changes in amount and color  - [place] appropriate cooling/warming therapies per order  - Administer medications as ordered  - Instruct and encourage patient and family to use good hand hygiene technique  - Identify and instruct in appropriate isolation precautions for identified infection/condition  Outcome: Progressing  Goal: Absence of fever/infection during neutropenic period  Description: INTERVENTIONS:  - Monitor WBC    Outcome: Progressing     Problem: SAFETY ADULT  Goal: Patient will remain free of falls  Description: INTERVENTIONS:  - Educate patient/family on patient safety including physical limitations  - Instruct patient to call for assistance with activity   - Consult OT/PT to assist with strengthening/mobility   - Keep Call bell within reach  - Keep bed low and locked with side rails adjusted as appropriate  - Keep care items and personal belongings within reach  - Initiate and maintain comfort rounds  - Make Fall Risk Sign visible to staff  - Offer Toileting every  Hours,  in advance of need  - Initiate/Maintain alarm  - Obtain necessary fall risk management equipment:   - Apply yellow socks and bracelet for high fall risk patients  - Consider moving patient to room near nurses station  Outcome: Progressing  Goal: Maintain or return to baseline ADL function  Description: INTERVENTIONS:  -  Assess patient's ability to carry out ADLs; assess patient's baseline for ADL function and identify physical deficits which impact ability to perform ADLs (bathing, care of mouth/teeth, toileting, grooming, dressing, etc.)  - Assess/evaluate cause of self-care deficits   - Assess range of motion  - Assess patient's mobility; develop plan if impaired  - Assess patient's need for assistive devices and provide as appropriate  - Encourage maximum independence but intervene and supervise when necessary  - Involve family in performance of ADLs  - Assess for home care needs following discharge   - Consider OT consult to assist with ADL evaluation and planning for discharge  - Provide patient education as appropriate  Outcome: Progressing  Goal: Maintains/Returns to pre admission functional level  Description: INTERVENTIONS:  - Perform AM-PAC 6 Click Basic Mobility/ Daily Activity assessment daily.  - Set and communicate daily mobility goal to care team and patient/family/caregiver.   - Collaborate with rehabilitation services on mobility goals if consulted  - Perform Range of Motion  times a day.  - Reposition patient every  hours.  - Dangle patient  times a day  - Stand patient  times a day  - Ambulate patient  times a day  - Out of bed to chair  times a day   - Out of bed for meals  times a day  - Out of bed for toileting  - Record patient progress and toleration of activity level   Outcome: Progressing     Problem: DISCHARGE PLANNING  Goal: Discharge to home or other facility with appropriate resources  Description: INTERVENTIONS:  - Identify barriers to discharge w/patient and caregiver  - Arrange for  needed discharge resources and transportation as appropriate  - Identify discharge learning needs (meds, wound care, etc.)  - Arrange for interpretive services to assist at discharge as needed  - Refer to Case Management Department for coordinating discharge planning if the patient needs post-hospital services based on physician/advanced practitioner order or complex needs related to functional status, cognitive ability, or social support system  Outcome: Progressing     Problem: Knowledge Deficit  Goal: Patient/family/caregiver demonstrates understanding of disease process, treatment plan, medications, and discharge instructions  Description: Complete learning assessment and assess knowledge base.  Interventions:  - Provide teaching at level of understanding  - Provide teaching via preferred learning methods  Outcome: Progressing

## 2024-01-26 NOTE — H&P
History and Physical - [facility]  Family Medicine Residency     Patient Information: Joaquin Frankel 43 y.o. male MRN: [id number]  Unit/Bed#: [address] [place] [address] Encounter: [id number]  Admitting Physician: Dennis Watermna MD   PCP: Luc Casas MD  Date of Admission:  01/25/24     Assessment and Plan     * Sepsis (HCC)  Assessment & Plan  Patient met sepsis criteria on admission as evidenced by fever, tachycardia, tachypnea 97 associated with leukocytosis/bandemia  Likely secondary to cellulitis of left lower extremity  Lactic acid within normal limit.      In the ER, patient received Vancomycin 2000 mg IV, Toradol 15 mg IV, Zofran 4 mg IV and 500 mL normal saline bolus    Plan  Follow-up blood cultures  Started patient on Ancef 2 gm IV   Normal saline 125 cc/hr  Check UA and urine cultures  Follow-up CBC, procalcitonin  Monitor hemodynamics    Cellulitis of left lower extremity  Assessment & Plan  Present on admission with erythema, edema, warmth and tenderness to touch and induration of the left lower extremity. He reports that he dropped a couch on his left ankle 3 days ago while moving furniture causing an abrasion to the area.  Pt has chronic history of lymphedema and he reports that his leg began to swell with pain and redness.    Associated with nausea and vomiting, diarrhea, fever, chills, headaches and muscle pains.   Pt has had multiple admissions in the past for cellulitis, likely due to lymphedema and diabetes  On admission he was febrile with tachycardia and leukocytosis WBC 14.82  Lactic acid WNL; prior lower leg doppler was negative for DVT    ER Course : Vancomycin 2000 mg IV, Toradol 15 mg IV, Zofran 4 mg IV and 500 mL normal saline bolus     Plan  Starting patient on Empiric Ancef 2 gm IV q8h  ID consulted, recommendations appreciated  Follow up blood cultures  Elevation of the affected limb  Monitor fever curve and hemodynamics  Continue wound care   Tylenol 650 mg PO q6h PRN for  mild pain; Dilaudid 0.2 mg for severe pain     Type 2 diabetes mellitus with diabetic polyneuropathy, without long-term current use of insulin ([facility])  Assessment & Plan  Lab Results   Component Value Date    HGBA1C 11.9 (H) 10/07/2023       Recent Labs     01/25/24  1933 01/25/24 2103   POCGLU 178* 168*       Chronic, uncontrolled. Home regimen includes Metformin 1000 mg BID and Victoza injection    Plan  Hold home Victoza  and metformin   Starting pt on ISS algorithm 4 and Accuchecks ACHS  Hypoglycemia protocol  Carbohydrate controlled diet  Monitor blood sugars closely   Follow up A1c.      Hyponatremia  Assessment & Plan  Na on admission 127. Likely hypovolemic hyponatremia from dehydration due to diarrhea and vomiting.  Pt had diarrhea and vomiting initially when he sustained injury to left leg, but has subsided yesterday    Plan  Trend BMP daily  IVF  cc/hr  Na levels expected to improve with IV fluid therapy  Check urine sodium and urine osmolality    CLOVIS (acute kidney injury) ([facility])  Assessment & Plan  Cr on admission 1.59. Baseline creatine is around 1-1.2. Likey prerenal CLOVIS in the setting of vomiting and diarrhea about 2 days prior to admission  No prior H/O CKD    Plan  Monitor BMP daily  Hold nephrotoxic medications including lisinopril and metformin  Continue with IVF hydration  Strict input and output monitoring  Avoid hypotension      Lymphedema of both lower extremities  Assessment & Plan  Chronic  Pt has B/L Lymphedema in lower extremities, Left worse than right    Plan  Continue to elevate legs  Wear compression stockings  Continue outpatient Lymphedema massages  Follow up with wound care outpatient  Pt needs better glycemic control  Continue Zinc supplementation    History of hidradenitis suppurativa  Assessment & Plan  Chronic, stable.   Patient follows with wound care for hidradenitis suppurativa. He has started biweekly Humira injections as ordered by his dermatologist. On examination,  patient has induration, scarring and wounds present present in groin and abdominal pannus.  No discharge from wounds    Plan  Follow up with wound care  Continue with [facility] outpatient    Essential hypertension  Assessment & Plan  Chronic, stable. SBP on admission 110-120.     Plan  Hold Lisinopril due to CLOVIS  Continue Amlodipine and Coreg with appropriate hold parameters  Monitor BP and vitals daily    Dyslipidemia  Assessment & Plan  Chronic, stable    Plan  Continue home Atorvastatin 80 mg daily    Coronary artery disease  Assessment & Plan  Patient with a history of cardiac catheterization in 2010 and 2020. Stents placed in [facility] in 2020, 2 total. Follows with cardiology outpatient. Denies any chest pain and shortness of breath on admission.  Home meds include Aspirin 81 mg daily and Carvedilol 6.25 mg BID    Plan  Continue home meds Aspirin and Coreg with hold parameters         Fluids: Sodium chloride 0.9% bolus 500 ml  Electrolyte repletion: Replete  as needed.  Nutrition:        Diet Orders   (From admission, onward)                 Start     Ordered    01/25/24 2110  Diet Markie/CHO Controlled; Consistent Carbohydrate Diet Level 2 (5 carb servings/75 grams CHO/meal)  Diet effective now        References:    Adult Nutrition Support Algorithm    RD Therapeutic Diet Order Protocol   Question Answer Comment   Diet Type Markie/CHO Controlled    Markie/CHO Controlled Consistent Carbohydrate Diet Level 2 (5 carb servings/75 grams CHO/meal)    RD to adjust diet per protocol? Yes        01/25/24 2124                   VTE Prophylaxis: VTE Score: 7 High Risk (Score >/= 5) - Pharmacological DVT Prophylaxis Ordered: heparin. Sequential Compression Devices Ordered.  Code Status: Level 1 - Full Code  Anticipated Length of Stay:  Patient will be admitted on an Observation basis with an anticipated length of stay of  more than 2 midnights.     Justification for Hospital Stay: Management of cellulitis  Total Time for Visit, including  "Counseling / Coordination of Care: 45 mins. Greater than 50% of this total time spent on direct patient counseling and coordination of care.  Patient Information Sharing:  Joaquin Frankel was notified today by inpatient team of the patient’s condition and current plan. All questions answered.     Chief Complaint:     Chief Complaint   Patient presents with    Fever     States he dropped a couch on his left leg about 4 days ago and \" 2 days later I became sick as a dog \". Patient is diabetic and ran out meds a few weeks ago. He is diaphoretic. Fever of 101.4    Wound Infection - Complicated       Subjective      History of Present Illness:     Joaquin Frankel is a 43 y.o. male with PMH  of morbid obesity, lower extremity lymphedema, hidradenitis suppurativa, uncontrolled diabetes mellitus type 2, CAD who presents with  increasing left lower extremity pain erythema associated with malaise, nausea, vomiting and diaphoresis. He reports that he dropped a couch on his left ankle 3 days ago while moving furniture causing an abrasion to the area.  Pt has chronic history of lymphedema and he reports that his leg began to swell with pain and redness. He complains of severe pain in LLE and rates it as a 10 on a scale of 0 to 10.  Patient had some discharge from his leg initially. Patient reported feeling dizzy and lightheaded as he was not able to eat for last 2 days due to his symptoms. He had vomiting and diarrhea initially which have subsided yesterday. He still feels nauseous but denies any further episode of vomiting. He has had multiple admissions in the past for similar complaints of cellulitis. He follows up with Wound care and Dermatology outpatient for hidradenitis suppurativa and is on Humira for it.     In ED patient was febrile with Tmax of 101.4 °F with associated tachycardia.  Lab revealed leukocytosis and hyponatremia. Blood cultures were obtained, patient was started on IV Vancomycin. On evaluation, pt has " bilateral lower extremity lymphedema, left lower extremity significantly worse. Abrasion with diffuse erythema up to thigh. Increased warmth and tenderness. No fluctuation or crepitations noted.      Review of Systems:  Review of Systems   Constitutional:  Positive for activity change, appetite change, chills, fatigue and fever.   HENT:  Negative for ear pain and sore throat.    Eyes: Negative.  Negative for pain and visual disturbance.   Respiratory: Negative.  Negative for apnea, cough, choking, chest tightness, shortness of breath, wheezing and stridor.    Cardiovascular:  Positive for leg swelling. Negative for chest pain and palpitations.   Gastrointestinal:  Positive for diarrhea and nausea. Negative for abdominal distention, abdominal pain, anal bleeding, blood in stool, constipation, rectal pain and vomiting.   Genitourinary: Negative.  Negative for difficulty urinating, dysuria and hematuria.   Musculoskeletal:  Positive for gait problem and myalgias. Negative for arthralgias, back pain, joint swelling, neck pain and neck stiffness.   Skin:  Positive for color change and wound. Negative for rash.   Neurological:  Positive for light-headedness and headaches. Negative for dizziness, tremors, seizures, syncope, facial asymmetry, speech difficulty, weakness and numbness.   Hematological: Negative.  Negative for adenopathy. Does not bruise/bleed easily.   Psychiatric/Behavioral: Negative.  Negative for self-injury and sleep disturbance. The patient is not nervous/anxious.    All other systems reviewed and are negative.       Past Medical History:   Diagnosis Date    Acute hypoxic respiratory failure (HCC) 10/7/2023    Arthritis     Breathing difficulty     Coronary artery disease     Diabetes mellitus (HCC)     Diabetic neuropathy (HCC) 5/28/2021    Heart disease     CAD   s/p ptca with 1 stent 2012    Hidradenitis suppurativa     groin    History of transfusion     Hyperlipidemia     Hypertension     MI  "(myocardial infarction) ([facility])     \" silent \" M.I. in the past    Morbid obesity with BMI of 50.0-59.9, adult ([facility])     Nicotine dependence     Obesity     Pilonidal cyst     Type 1 non-ST elevation myocardial infarction (NSTEMI) ([facility]) 11/29/2020     Past Surgical History:   Procedure Laterality Date    CARDIAC SURGERY      CORONARY ANGIOPLASTY WITH STENT PLACEMENT      INCISION AND DRAINAGE OF WOUND N/A 1/24/2017    Procedure: INCISION AND DRAINAGE (I&D) BUTTOCK, PILONIDAL CYST;  Surgeon: Simba Adhikari MD;  Location: [facility];  Service:     LEG SURGERY Left     I&D of left leg 2012    LA DEBRIDEMENT SUBCUTANEOUS TISSUE 1ST 20 SQ CM/< Left 7/6/2021    Procedure: DEBRIDEMENT WOUND (WASH OUT);  Surgeon: Sudheer Mcfarlane MD;  Location: [facility];  Service: General    LA EXC B9 LESION MRGN XCP SK TG T/A/L >4.0 CM Left 4/6/2021    Procedure: EXCISION THIGH MASS;  Surgeon: Sudheer Mcfarlane MD;  Location: [facility];  Service: General    LA EXCISION H/P/P/U COMPLEX REPAIR Left 7/6/2021    Procedure: EXCISION PERINEAL ABSCESS LEFT THIGH;  Surgeon: Sudheer Mcfarlane MD;  [facility]: [facility];  Service: General    LA NEGATIVE PRESSURE WOUND THERAPY DME <= 50 SQ CM Left 4/6/2021    Procedure: APPLICATION VAC DRESSING THIGH;  Surgeon: Sudheer Mcfarlane MD;  [facility];  Service: General    WOUND DEBRIDEMENT Left 4/17/2021    Procedure: DEBRIDEMENT WOUND AND DRESSING CHANGE (WASH OUT);  Surgeon: Mahin Traore DO;  [facility]: [facility];  Service: General    WOUND DEBRIDEMENT Left 4/22/2021    Procedure: DEBRIDEMENT LOWER EXTREMITY (WASH OUT), left groin and thigh;  Surgeon: Sudheer Mcfarlane MD;  [facility];  Service: General    WOUND DEBRIDEMENT Left 5/26/2021    Procedure: DEBRIDEMENT LOWER EXTREMITY (WASH OUT);  Surgeon: Zak Castanon DO;  [facility];  Service: General    WOUND DEBRIDEMENT Left 5/28/2021    Procedure: Washout left thigh wound and closure;  Surgeon: Amor Jaramillo DO;  [facility]: [facility];  Service: " General     Allergies   Allergen Reactions    Amoxicillin Hives     childhood     Prior to Admission Medications   Prescriptions Last Dose Informant Patient Reported? Taking?   Adalimumab (Humira Pen) 80 MG/0.8ML PNKT   No No   Sig: Inject 80 mg on week 6 then every other week there after for maintenance dose.   EPINEPHrine (EPIPEN) 0.3 mg/0.3 mL SOAJ   No No   Sig: Inject 0.3 mL (0.3 mg total) into a muscle once for 1 dose   Insulin Pen Needle (BD ULTRA-FINE PEN NEEDLES) 29G X 12.7MM MISC   No No   Sig: USE 1 NEEDLE ONCE DAILY FOR INJECTION UNDER THE SKIN   Victoza injection   No No   Sig: INJECT 0.6MG UNDER THE SKIN DAILY FOR A WEEK, THEN INCREASE TO 1.2MG DAILY ON THE SECOND WEEK, THEN INCREASE TO 1.8MG DAILY ON THE THIRD WEEK AND GOING FORWARD   amLODIPine (NORVASC) 10 mg tablet   No No   Sig: Take 1 tablet (10 mg total) by mouth daily   aspirin (ECOTRIN LOW STRENGTH) 81 mg EC tablet   No No   Sig: Take 1 tablet (81 mg total) by mouth daily   atorvastatin (LIPITOR) 80 mg tablet   No No   Sig: Take 1 tablet (80 mg total) by mouth daily   carvedilol (COREG) 6.25 mg tablet   No No   Sig: TAKE ONE TABLET BY MOUTH TWICE A DAY WITH MEALS   lisinopril (ZESTRIL) 20 mg tablet   No No   Sig: Take 1 tablet (20 mg total) by mouth daily   metFORMIN (GLUCOPHAGE) 1000 MG tablet   No No   Sig: Take 1 tablet (1,000 mg total) by mouth 2 (two) times a day with meals   zinc gluconate 50 mg tablet   No No   Sig: Take 1 tablet (50 mg total) by mouth daily   zinc sulfate (ZINCATE) 220 mg capsule   No No   Sig: Take 1 capsule (220 mg total) by mouth daily      Facility-Administered Medications: None     Social History     Socioeconomic History    Marital status: Single     Spouse name: Not on file    Number of children: 1    Years of education: 12    Highest education level: High school graduate   Occupational History    Not on file   Tobacco Use    Smoking status: Former     Current packs/day: 0.00     Average packs/day: 1.5 packs/day  for 20.0 years (29.9 ttl pk-yrs)     Types: Cigarettes     Start date: 2021     Quit date: 2021     Years since quittin.9    Smokeless tobacco: Never   Vaping Use    Vaping status: Never Used   Substance and Sexual Activity    Alcohol use: Not Currently     Comment: rarely    Drug use: No    Sexual activity: Not Currently     Partners: Female   Other Topics Concern    Not on file   Social History Narrative    Not on file     Social Determinants of Health     Financial Resource Strain: Not on file   Food Insecurity: No Food Insecurity (2022)    Hunger Vital Sign     Worried About Running Out of Food in the Last Year: Never true     Ran Out of Food in the Last Year: Never true   Transportation Needs: No Transportation Needs (2022)    PRAPARE - Transportation     Lack of Transportation (Medical): No     Lack of Transportation (Non-Medical): No   Physical Activity: Not on file   Stress: Not on file   Social Connections: Not on file   Intimate Partner Violence: Not on file   Housing Stability: Low Risk  (2022)    Housing Stability Vital Sign     Unable to Pay for Housing in the Last Year: No     Number of Places Lived in the Last Year: 1     Unstable Housing in the Last Year: No     Family History   Problem Relation Age of Onset    Heart disease Father     Diabetes Father     Hypertension Mother     Heart disease Mother         Patient Pre-hospital Living Situation: Home  Patient Pre-hospital Level of Mobility: Limited mobility  Patient Pre-hospital Diet Restrictions: Diabetic diet          Objective     Physical Exam:   Vitals:   Blood Pressure: 116/71 (24)  Pulse: 85 (24)  Temperature: 98.4 °F (36.9 °C) (24)  Temp Source: Oral (24)  Respirations: (!) 25 (24)  Weight - Scale: (!) 172 kg (379 lb) (24)  SpO2: 94 % (24)     Physical Exam  Constitutional:       General: He is in acute distress.      Appearance: He is obese.  He is diaphoretic.   HENT:      Mouth/Throat:      Mouth: Mucous membranes are dry.   Eyes:      Extraocular Movements: Extraocular movements intact.      Conjunctiva/sclera: Conjunctivae normal.      Pupils: Pupils are equal, round, and reactive to light.   Cardiovascular:      Rate and Rhythm: Regular rhythm. Tachycardia present.      Pulses: Normal pulses.      Heart sounds: No murmur heard.     No friction rub. No gallop.   Pulmonary:      Effort: Pulmonary effort is normal. No respiratory distress.      Breath sounds: Normal breath sounds. No stridor. No wheezing, rhonchi or rales.   Chest:      Chest wall: No tenderness.   Abdominal:      General: Bowel sounds are normal. There is no distension.      Palpations: Abdomen is soft.      Tenderness: There is abdominal tenderness. There is no guarding.   Musculoskeletal:         General: Tenderness and signs of injury present.      Cervical back: Normal range of motion.      Right lower leg: Edema present.      Left lower leg: Edema present.      Comments: Erythema up to mid thigh with indistinct margins, warmth to touch, tenderness to palpation, significant nonpitting edema of the  left lower leg.  Abrasion near left ankle.  No crepitus or discharge   Skin:     Capillary Refill: Capillary refill takes less than 2 seconds.      Findings: Bruising present.   Neurological:      General: No focal deficit present.      Mental Status: He is alert and oriented to person, place, and time. Mental status is at baseline.   Psychiatric:         Mood and Affect: Mood normal.           Lab Results: I have personally reviewed pertinent reports.    Results from last 7 days   Lab Units 01/25/24 1934   WBC Thousand/uL 14.82*   HEMOGLOBIN g/dL 12.5   HEMATOCRIT % 37.5   PLATELETS Thousands/uL 274   NEUTROS PCT % 88*   LYMPHS PCT % 5*   MONOS PCT % 6   EOS PCT % 0     Results from last 7 days   Lab Units 01/25/24 1934   POTASSIUM mmol/L 4.8   CHLORIDE mmol/L 93*   CO2 mmol/L 25  "  BUN mg/dL 28*   CREATININE mg/dL 1.59*   CALCIUM mg/dL 7.9*   ALK PHOS U/L 73   ALT U/L 12   AST U/L 28   EGFR ml/min/1.73sq m 52         Results from last 7 days   Lab Units 01/25/24 2006   LACTIC ACID mmol/L 1.0                    Invalid input(s): \"URIBILINOGEN\"                    Imaging: I have personally reviewed pertinent reports.    No results found.               ** Please Note: This note has been constructed using a voice recognition system **     Reji Whipple MD  01/25/24  10:43 PM   "

## 2024-01-26 NOTE — PROGRESS NOTES
Daily Progress Note - [facility]  Family Medicine Residency  Joaquin Frankel 43 y.o. male MRN: [id number]  Unit/Bed#: [address] [place] [address] Encounter: [id number]  Admitting Physician: Dennis Waterman MD   PCP: Luc Casas MD  Date of Admission:  1/25/2024  6:53 PM    Assessment and Plan    * Sepsis (HCC)  Assessment & Plan  Present on admission as evidenced by fever, tachycardia, and tachypnea associated with leukocytosis. Likely secondary to cellulitis of left lower extremity. LA wnl. Procal and CRP elevated.     ER Course: Vancomycin 2g IV, Toradol 15 mg IV, Zofran 4 mg IV and 500 mL NS bolus    1/26: Leukocytosis resolved. Cultures pending. No additional fevers or discharge from wound. Pain still poorly controlled, will optimize and evaluate for other sources of pain.    Plan  Blood Cx pending  Continue Ancef 2 gm IV   Normal saline 125 cc/hr  Follow up UA and Urine Cx  Trend CBC, procalcitonin  Monitor hemodynamics    CLOVIS (acute kidney injury) (HCC)  Assessment & Plan  Acute    Cr 1.59 on admission (Baseline 1-1.2). Likey prerenal CLOVIS in the setting of vomiting and diarrhea about 2 days prior to admission  No prior history of CKD.    1/26: Cr 1.66, GFR 50. Of note, did receive IV toradol on admission. Will avoid NSAID's and other nephrotoxins.    Plan  Monitor BMP daily  Hold nephrotoxic medications including lisinopril and metformin  Continue with IVF hydration  Strict input and output monitoring  Avoid hypotension      Cellulitis of left lower extremity  Assessment & Plan  Acute    Presented with erythema, edema, warmth, and tenderness of left lower extremity, after trauma. Patient reports he dropped a couch on his left ankle 3 days ago while moving furniture.    Labs on admission showed leukocytosis, fever, and tachycardia. Received Vancomycin 2g IV in the ED and Toradol 15 mg IV  Of note patient has had multiple admissions in the past for cellulitis and also has history of chronic lymphedema.      1/26: Patient reports pain is still poorly controlled. Received Toradol, Tylenol, and Dilaudid 0.5 mg x 1. Will optimize pain regimen with regards to poor renal function.    Plan  Continue Empiric Ancef 2 gm IV q8h  Follow up Blood Cx  Will obtain doppler LLE to r/o DVT given severe pain, swelling and calf tenderness  X-ray left ankle r/o fracture  Optimize electrolytes, mild hypokalemia  Elevation of the affected limb  Monitor fever curve and hemodynamics  Continue wound care   Pain control with Tylenol 975 mg scheduled, Dilaudid 0.5 mg PRN for severe pain, 0.2 mg for breakthrough. Avoid NSAID's due to poor GFR < 60.     Hyponatremia  Assessment & Plan  Improving     Na on admission 127. Likely hypovolemic hyponatremia from dehydration due to diarrhea and vomiting.  Pt had diarrhea and vomiting initially when he sustained injury to left leg, no further episodes since admission    1/26: Na 130, continue IV fluids, trend BMP    Plan  Trend BMP daily  IVF  cc/hr  F/u urine sodium and urine osmolality    Type 2 diabetes mellitus with diabetic polyneuropathy, without long-term current use of insulin (Formerly [facility])  Assessment & Plan  Lab Results   Component Value Date    HGBA1C 11.9 (H) 10/07/2023       Recent Labs     01/25/24  1933 01/25/24  2103   POCGLU 178* 168*       Chronic, uncontrolled. Home regimen includes Metformin 1000 mg BID and Victoza injection    Plan  Hold home Victoza  and metformin   Starting pt on ISS algorithm 4 and Accuchecks ACHS  Hypoglycemia protocol  Carbohydrate controlled diet  Monitor blood sugars closely   Follow up A1c.      Lymphedema of both lower extremities  Assessment & Plan  Chronic  Pt has B/L Lymphedema in lower extremities, Left worse than right    Plan  Continue to elevate legs  Wear compression stockings  Continue outpatient Lymphedema massages  Follow up with wound care outpatient  Pt needs better glycemic control  Continue Zinc supplementation    History of hidradenitis  suppurativa  Assessment & Plan  Chronic, stable.   Patient follows with wound care for hidradenitis suppurativa. He has started biweekly Humira injections as ordered by his dermatologist. On examination, patient has induration, scarring and wounds present present in groin and abdominal pannus.  No discharge from wounds    Plan  Follow up with wound care  Continue with Humira outpatient    Essential hypertension  Assessment & Plan  Chronic, stable. SBP on admission 110-120.     Plan  Hold Lisinopril due to CLOVIS  Continue Amlodipine and Coreg with appropriate hold parameters  Monitor BP and vitals daily    Dyslipidemia  Assessment & Plan  Chronic, stable    Plan  Continue home Atorvastatin 80 mg daily    Coronary artery disease  Assessment & Plan  Patient with a history of cardiac catheterization in 2010 and 2020. Stents placed in [facility] in 2020, 2 total. Follows with cardiology outpatient. Denies any chest pain and shortness of breath on admission.  Home meds include Aspirin 81 mg daily and Carvedilol 6.25 mg BID    Plan  Continue home meds Aspirin and Coreg with hold parameters        VTE Pharmacologic Prophylaxis: VTE Score: 7 High Risk (Score >/= 5) - Pharmacological DVT Prophylaxis Ordered: heparin. Sequential Compression Devices Ordered.    Patient Centered Rounds: I have performed bedside rounds with nursing staff today.    Discussions with Specialists or Other Care Team Provider:     Education and Discussions with Family / Patient:   Patient Information Sharing: With the consent of Joaquin Frankel , their loved ones will be notified by inpatient team of the patient’s condition and current plan.  All questions answered.     Time Spent for Care: 30 minutes.  More than 50% of total time spent on counseling and coordination of care as described above.    Current Length of Stay: 0 day(s)    Current Patient Status: Observation   Certification Statement: The patient will continue to require additional inpatient  hospital stay due to sepsis, CLOVIS    Discharge Plan: 48-72 hours of IV Abx    Code Status: Level 1 - Full Code    Subjective:   Patient was seen and examined at bedside, not in acute distress. Overnight, he reported having night sweats and chills. This morning he was having 8/10 pain with nausea but no vomiting. He had a leg mass removal surgery a couple years ago that resulted in chronic lymphedema and chronic infections since then. The last time he was in the hospital for cellulitis was 2023.      Objective     Objective:   Vitals:   Temp (24hrs), Av °F (37.2 °C), Min:98.2 °F (36.8 °C), Max:101.4 °F (38.6 °C)    Temp:  [98.2 °F (36.8 °C)-101.4 °F (38.6 °C)] 98.2 °F (36.8 °C)  HR:  [78-97] 78  Resp:  [14-25] 18  BP: (114-126)/(55-71) 114/59  SpO2:  [93 %-94 %] 94 %  Body mass index is 58.7 kg/m².     Input and Output Summary (last 24 hours):     No intake or output data in the 24 hours ending 24 0658    Physical Exam:   Physical Exam  Constitutional:       General: He is not in acute distress.  Eyes:      Pupils: Pupils are equal, round, and reactive to light.   Cardiovascular:      Rate and Rhythm: Normal rate and regular rhythm.      Pulses: Normal pulses.      Heart sounds: No murmur heard.     No friction rub. No gallop.   Pulmonary:      Effort: Pulmonary effort is normal. No respiratory distress.      Breath sounds: Normal breath sounds. No wheezing.   Abdominal:      General: Bowel sounds are normal. There is no distension.      Palpations: Abdomen is soft. There is no mass.      Tenderness: There is no abdominal tenderness.   Musculoskeletal:      Left lower leg: Swelling and tenderness present. Edema present.      Comments: Erythema of entire left leg up to intertriginous area with indistinct margins, warm to touch, tenderness on palpation  Abrasion near left ankle with no purulent discharge     Neurological:      General: No focal deficit present.      Mental Status: He is alert and  oriented to person, place, and time.       Additional Data:     Labs:  Results from last 7 days   Lab Units 01/26/24  0520   WBC Thousand/uL 9.52   HEMOGLOBIN g/dL 11.4*   HEMATOCRIT % 36.2*   PLATELETS Thousands/uL 267   NEUTROS PCT % 83*   LYMPHS PCT % 8*   MONOS PCT % 8   EOS PCT % 0     Results from last 7 days   Lab Units 01/26/24  0520   POTASSIUM mmol/L 3.4*   CHLORIDE mmol/L 96   CO2 mmol/L 27   BUN mg/dL 33*   CREATININE mg/dL 1.66*   CALCIUM mg/dL 7.7*   ALK PHOS U/L 74   ALT U/L 11   AST U/L 17         Results from last 7 days   Lab Units 01/25/24  2103 01/25/24  1933   POC GLUCOSE mg/dl 168* 178*           * I Have Reviewed All Lab Data Listed Above.  * Additional Pertinent Lab Tests Reviewed: All Labs Within Last 24 Hours Reviewed    Imaging:    Imaging Reports Reviewed     Recent Cultures (last 7 days):     Results from last 7 days   Lab Units 01/25/24  1934   BLOOD CULTURE  Received in Microbiology Lab. Culture in Progress.  Received in Microbiology Lab. Culture in Progress.       Last 24 Hours Medication List:   Current Facility-Administered Medications   Medication Dose Route Frequency Provider Last Rate    acetaminophen  975 mg Oral Q8H [facility] Wilman Pino MD      amLODIPine  10 mg Oral Daily Reji Whipple MD      aspirin  81 mg Oral Daily Reji Whipple MD      atorvastatin  80 mg Oral Daily Reji Whipple MD      calcium carbonate  1,000 mg Oral Daily PRN Reji Whipple MD      carvedilol  6.25 mg Oral BID With Meals Reji Whipple MD      cefazolin  2,000 mg Intravenous Q8H Reji Whipple MD 2,000 mg (01/26/24 0605)    heparin (porcine)  7,500 Units Subcutaneous Q8H [facility] Reji Whipple MD      HYDROmorphone  0.2 mg Intravenous Q6H PRN Wilman Pino MD      insulin lispro  2-12 Units Subcutaneous 4x Daily (AC & HS) Reji Whipple MD      ondansetron  4 mg Intravenous Q6H PRN Reji Whipple MD      potassium chloride  40 mEq Oral Once Wilman Pino MD       Followed by    potassium chloride  20 mEq Oral Once Wilman Pino MD      sodium chloride  125 mL/hr Intravenous Continuous Reji Whipple  mL/hr (01/26/24 0608)    zinc sulfate  220 mg Oral Daily Reji Whipple MD               ** Please Note: Dictation voice to text software may have been used in the creation of this document. **    Wilman Pino MD  01/26/24  6:58 AM

## 2024-01-26 NOTE — ASSESSMENT & PLAN NOTE
Improving     Na on admission 127. Likely hypovolemic hyponatremia from dehydration due to diarrhea and vomiting.  Pt had diarrhea and vomiting initially when he sustained injury to left leg, no further episodes since admission    1/26: Na 130, continue IV fluids    Urine osm 535, Urine sodium 22. Suspect secondary to dehydration     Plan  F/U BMP in one week

## 2024-01-27 LAB
ALBUMIN SERPL BCP-MCNC: 2.4 G/DL (ref 3.5–5)
ALP SERPL-CCNC: 93 U/L (ref 34–104)
ALT SERPL W P-5'-P-CCNC: 11 U/L (ref 7–52)
ANION GAP SERPL CALCULATED.3IONS-SCNC: 7 MMOL/L
AST SERPL W P-5'-P-CCNC: 18 U/L (ref 13–39)
BACTERIA UR CULT: NORMAL
BASOPHILS # BLD AUTO: 0.01 THOUSANDS/ÂΜL (ref 0–0.1)
BASOPHILS NFR BLD AUTO: 0 % (ref 0–1)
BILIRUB SERPL-MCNC: 0.27 MG/DL (ref 0.2–1)
BUN SERPL-MCNC: 27 MG/DL (ref 5–25)
CALCIUM ALBUM COR SERPL-MCNC: 7.8 MG/DL (ref 8.3–10.1)
CALCIUM SERPL-MCNC: 6.5 MG/DL (ref 8.4–10.2)
CHLORIDE SERPL-SCNC: 103 MMOL/L (ref 96–108)
CO2 SERPL-SCNC: 23 MMOL/L (ref 21–32)
CREAT SERPL-MCNC: 1.25 MG/DL (ref 0.6–1.3)
EOSINOPHIL # BLD AUTO: 0.07 THOUSAND/ÂΜL (ref 0–0.61)
EOSINOPHIL NFR BLD AUTO: 1 % (ref 0–6)
ERYTHROCYTE [DISTWIDTH] IN BLOOD BY AUTOMATED COUNT: 13.2 % (ref 11.6–15.1)
GFR SERPL CREATININE-BSD FRML MDRD: 70 ML/MIN/1.73SQ M
GLUCOSE SERPL-MCNC: 151 MG/DL (ref 65–140)
GLUCOSE SERPL-MCNC: 182 MG/DL (ref 65–140)
GLUCOSE SERPL-MCNC: 183 MG/DL (ref 65–140)
GLUCOSE SERPL-MCNC: 260 MG/DL (ref 65–140)
GLUCOSE SERPL-MCNC: 260 MG/DL (ref 65–140)
HCT VFR BLD AUTO: 26.9 % (ref 36.5–49.3)
HGB BLD-MCNC: 8.5 G/DL (ref 12–17)
IMM GRANULOCYTES # BLD AUTO: 0.07 THOUSAND/UL (ref 0–0.2)
IMM GRANULOCYTES NFR BLD AUTO: 1 % (ref 0–2)
LYMPHOCYTES # BLD AUTO: 0.77 THOUSANDS/ÂΜL (ref 0.6–4.47)
LYMPHOCYTES NFR BLD AUTO: 13 % (ref 14–44)
MAGNESIUM SERPL-MCNC: 1.5 MG/DL (ref 1.9–2.7)
MCH RBC QN AUTO: 27.6 PG (ref 26.8–34.3)
MCHC RBC AUTO-ENTMCNC: 31.6 G/DL (ref 31.4–37.4)
MCV RBC AUTO: 87 FL (ref 82–98)
MONOCYTES # BLD AUTO: 0.53 THOUSAND/ÂΜL (ref 0.17–1.22)
MONOCYTES NFR BLD AUTO: 9 % (ref 4–12)
NEUTROPHILS # BLD AUTO: 4.45 THOUSANDS/ÂΜL (ref 1.85–7.62)
NEUTS SEG NFR BLD AUTO: 76 % (ref 43–75)
NRBC BLD AUTO-RTO: 0 /100 WBCS
PLATELET # BLD AUTO: 174 THOUSANDS/UL (ref 149–390)
PLATELET BLD QL SMEAR: ADEQUATE
PMV BLD AUTO: 9.8 FL (ref 8.9–12.7)
POTASSIUM SERPL-SCNC: 3.5 MMOL/L (ref 3.5–5.3)
PROCALCITONIN SERPL-MCNC: 2.66 NG/ML
PROT SERPL-MCNC: 6.1 G/DL (ref 6.4–8.4)
RBC # BLD AUTO: 3.08 MILLION/UL (ref 3.88–5.62)
RBC MORPH BLD: NORMAL
SODIUM SERPL-SCNC: 133 MMOL/L (ref 135–147)
WBC # BLD AUTO: 5.9 THOUSAND/UL (ref 4.31–10.16)

## 2024-01-27 PROCEDURE — 99232 SBSQ HOSP IP/OBS MODERATE 35: CPT | Performed by: INTERNAL MEDICINE

## 2024-01-27 PROCEDURE — 82948 REAGENT STRIP/BLOOD GLUCOSE: CPT

## 2024-01-27 PROCEDURE — 84145 PROCALCITONIN (PCT): CPT

## 2024-01-27 PROCEDURE — 83735 ASSAY OF MAGNESIUM: CPT

## 2024-01-27 PROCEDURE — 85025 COMPLETE CBC W/AUTO DIFF WBC: CPT

## 2024-01-27 PROCEDURE — 80053 COMPREHEN METABOLIC PANEL: CPT

## 2024-01-27 RX ORDER — INSULIN GLARGINE 100 [IU]/ML
20 INJECTION, SOLUTION SUBCUTANEOUS
Status: DISCONTINUED | OUTPATIENT
Start: 2024-01-27 | End: 2024-01-29

## 2024-01-27 RX ORDER — POTASSIUM CHLORIDE 20 MEQ/1
40 TABLET, EXTENDED RELEASE ORAL ONCE
Status: COMPLETED | OUTPATIENT
Start: 2024-01-27 | End: 2024-01-27

## 2024-01-27 RX ORDER — HYDROMORPHONE HCL/PF 1 MG/ML
0.5 SYRINGE (ML) INJECTION EVERY 6 HOURS PRN
Status: DISCONTINUED | OUTPATIENT
Start: 2024-01-27 | End: 2024-01-28

## 2024-01-27 RX ORDER — INSULIN LISPRO 100 [IU]/ML
5 INJECTION, SOLUTION INTRAVENOUS; SUBCUTANEOUS
Status: DISCONTINUED | OUTPATIENT
Start: 2024-01-27 | End: 2024-01-28

## 2024-01-27 RX ORDER — MAGNESIUM SULFATE HEPTAHYDRATE 40 MG/ML
2 INJECTION, SOLUTION INTRAVENOUS ONCE
Status: COMPLETED | OUTPATIENT
Start: 2024-01-27 | End: 2024-01-28

## 2024-01-27 RX ORDER — INSULIN LISPRO 100 [IU]/ML
1-5 INJECTION, SOLUTION INTRAVENOUS; SUBCUTANEOUS
Status: DISCONTINUED | OUTPATIENT
Start: 2024-01-27 | End: 2024-01-30 | Stop reason: HOSPADM

## 2024-01-27 RX ORDER — INSULIN LISPRO 100 [IU]/ML
2-12 INJECTION, SOLUTION INTRAVENOUS; SUBCUTANEOUS
Status: DISCONTINUED | OUTPATIENT
Start: 2024-01-27 | End: 2024-01-30 | Stop reason: HOSPADM

## 2024-01-27 RX ORDER — ACETAMINOPHEN 325 MG/1
650 TABLET ORAL EVERY 8 HOURS PRN
Status: DISCONTINUED | OUTPATIENT
Start: 2024-01-27 | End: 2024-01-30

## 2024-01-27 RX ADMIN — CEFAZOLIN SODIUM 2000 MG: 2 SOLUTION INTRAVENOUS at 14:31

## 2024-01-27 RX ADMIN — ZINC SULFATE 220 MG (50 MG) CAPSULE 220 MG: CAPSULE at 08:08

## 2024-01-27 RX ADMIN — HEPARIN SODIUM 7500 UNITS: 5000 INJECTION INTRAVENOUS; SUBCUTANEOUS at 22:38

## 2024-01-27 RX ADMIN — HYDROMORPHONE HYDROCHLORIDE 0.2 MG: 0.2 INJECTION, SOLUTION INTRAMUSCULAR; INTRAVENOUS; SUBCUTANEOUS at 04:17

## 2024-01-27 RX ADMIN — INSULIN GLARGINE 20 UNITS: 100 INJECTION, SOLUTION SUBCUTANEOUS at 22:37

## 2024-01-27 RX ADMIN — ASPIRIN 81 MG CHEWABLE TABLET 81 MG: 81 TABLET CHEWABLE at 08:08

## 2024-01-27 RX ADMIN — AMLODIPINE BESYLATE 10 MG: 10 TABLET ORAL at 08:08

## 2024-01-27 RX ADMIN — CARVEDILOL 6.25 MG: 6.25 TABLET, FILM COATED ORAL at 15:41

## 2024-01-27 RX ADMIN — CARVEDILOL 6.25 MG: 6.25 TABLET, FILM COATED ORAL at 08:08

## 2024-01-27 RX ADMIN — HYDROMORPHONE HYDROCHLORIDE 0.5 MG: 1 INJECTION, SOLUTION INTRAMUSCULAR; INTRAVENOUS; SUBCUTANEOUS at 18:43

## 2024-01-27 RX ADMIN — HEPARIN SODIUM 7500 UNITS: 5000 INJECTION INTRAVENOUS; SUBCUTANEOUS at 05:47

## 2024-01-27 RX ADMIN — INSULIN LISPRO 2 UNITS: 100 INJECTION, SOLUTION INTRAVENOUS; SUBCUTANEOUS at 08:11

## 2024-01-27 RX ADMIN — HYDROMORPHONE HYDROCHLORIDE 0.5 MG: 1 INJECTION, SOLUTION INTRAMUSCULAR; INTRAVENOUS; SUBCUTANEOUS at 11:13

## 2024-01-27 RX ADMIN — INSULIN LISPRO 2 UNITS: 100 INJECTION, SOLUTION INTRAVENOUS; SUBCUTANEOUS at 12:14

## 2024-01-27 RX ADMIN — CEFAZOLIN SODIUM 2000 MG: 2 SOLUTION INTRAVENOUS at 22:38

## 2024-01-27 RX ADMIN — INSULIN LISPRO 5 UNITS: 100 INJECTION, SOLUTION INTRAVENOUS; SUBCUTANEOUS at 12:14

## 2024-01-27 RX ADMIN — INSULIN LISPRO 2 UNITS: 100 INJECTION, SOLUTION INTRAVENOUS; SUBCUTANEOUS at 22:38

## 2024-01-27 RX ADMIN — SODIUM CHLORIDE 125 ML/HR: 0.9 INJECTION, SOLUTION INTRAVENOUS at 04:11

## 2024-01-27 RX ADMIN — HYDROMORPHONE HYDROCHLORIDE 0.5 MG: 1 INJECTION, SOLUTION INTRAMUSCULAR; INTRAVENOUS; SUBCUTANEOUS at 05:47

## 2024-01-27 RX ADMIN — ATORVASTATIN CALCIUM 80 MG: 80 TABLET, FILM COATED ORAL at 08:08

## 2024-01-27 RX ADMIN — ACETAMINOPHEN 975 MG: 325 TABLET ORAL at 08:08

## 2024-01-27 RX ADMIN — MAGNESIUM SULFATE HEPTAHYDRATE 2 G: 40 INJECTION, SOLUTION INTRAVENOUS at 10:49

## 2024-01-27 RX ADMIN — CEFAZOLIN SODIUM 2000 MG: 2 SOLUTION INTRAVENOUS at 05:48

## 2024-01-27 RX ADMIN — INSULIN LISPRO 6 UNITS: 100 INJECTION, SOLUTION INTRAVENOUS; SUBCUTANEOUS at 16:14

## 2024-01-27 RX ADMIN — HEPARIN SODIUM 7500 UNITS: 5000 INJECTION INTRAVENOUS; SUBCUTANEOUS at 14:31

## 2024-01-27 RX ADMIN — POTASSIUM CHLORIDE 40 MEQ: 1500 TABLET, EXTENDED RELEASE ORAL at 10:46

## 2024-01-27 RX ADMIN — INSULIN LISPRO 5 UNITS: 100 INJECTION, SOLUTION INTRAVENOUS; SUBCUTANEOUS at 16:15

## 2024-01-27 NOTE — PLAN OF CARE
Problem: PAIN - ADULT  Goal: Verbalizes/displays adequate comfort level or baseline comfort level  Description: Interventions:  - Encourage patient to monitor pain and request assistance  - Assess pain using appropriate pain scale  - Administer analgesics based on type and severity of pain and evaluate response  - Implement non-pharmacological measures as appropriate and evaluate response  - Consider cultural and social influences on pain and pain management  - Notify physician/advanced practitioner if interventions unsuccessful or patient reports new pain  Outcome: Progressing     Problem: INFECTION - ADULT  Goal: Absence or prevention of progression during hospitalization  Description: INTERVENTIONS:  - Assess and monitor for signs and symptoms of infection  - Monitor lab/diagnostic results  - Monitor all insertion sites, i.e. indwelling lines, tubes, and drains  - Monitor endotracheal if appropriate and nasal secretions for changes in amount and color  - [place] appropriate cooling/warming therapies per order  - Administer medications as ordered  - Instruct and encourage patient and family to use good hand hygiene technique  - Identify and instruct in appropriate isolation precautions for identified infection/condition  Outcome: Progressing  Goal: Absence of fever/infection during neutropenic period  Description: INTERVENTIONS:  - Monitor WBC    Outcome: Progressing     Problem: SAFETY ADULT  Goal: Patient will remain free of falls  Description: INTERVENTIONS:  - Educate patient/family on patient safety including physical limitations  - Instruct patient to call for assistance with activity   - Consult OT/PT to assist with strengthening/mobility   - Keep Call bell within reach  - Keep bed low and locked with side rails adjusted as appropriate  - Keep care items and personal belongings within reach  - Initiate and maintain comfort rounds  - Make Fall Risk Sign visible to staff  - Offer Toileting every  Hours,  in advance of need  - Initiate/Maintain alarm  - Obtain necessary fall risk management equipment:   - Apply yellow socks and bracelet for high fall risk patients  - Consider moving patient to room near nurses station  Outcome: Progressing  Goal: Maintain or return to baseline ADL function  Description: INTERVENTIONS:  -  Assess patient's ability to carry out ADLs; assess patient's baseline for ADL function and identify physical deficits which impact ability to perform ADLs (bathing, care of mouth/teeth, toileting, grooming, dressing, etc.)  - Assess/evaluate cause of self-care deficits   - Assess range of motion  - Assess patient's mobility; develop plan if impaired  - Assess patient's need for assistive devices and provide as appropriate  - Encourage maximum independence but intervene and supervise when necessary  - Involve family in performance of ADLs  - Assess for home care needs following discharge   - Consider OT consult to assist with ADL evaluation and planning for discharge  - Provide patient education as appropriate  Outcome: Progressing  Goal: Maintains/Returns to pre admission functional level  Description: INTERVENTIONS:  - Perform AM-PAC 6 Click Basic Mobility/ Daily Activity assessment daily.  - Set and communicate daily mobility goal to care team and patient/family/caregiver.   - Collaborate with rehabilitation services on mobility goals if consulted  - Perform Range of Motion  times a day.  - Reposition patient every  hours.  - Dangle patient  times a day  - Stand patient  times a day  - Ambulate patient  times a day  - Out of bed to chair  times a day   - Out of bed for meals  times a day  - Out of bed for toileting  - Record patient progress and toleration of activity level   Outcome: Progressing     Problem: DISCHARGE PLANNING  Goal: Discharge to home or other facility with appropriate resources  Description: INTERVENTIONS:  - Identify barriers to discharge w/patient and caregiver  - Arrange for  needed discharge resources and transportation as appropriate  - Identify discharge learning needs (meds, wound care, etc.)  - Arrange for interpretive services to assist at discharge as needed  - Refer to Case Management Department for coordinating discharge planning if the patient needs post-hospital services based on physician/advanced practitioner order or complex needs related to functional status, cognitive ability, or social support system  Outcome: Progressing     Problem: Knowledge Deficit  Goal: Patient/family/caregiver demonstrates understanding of disease process, treatment plan, medications, and discharge instructions  Description: Complete learning assessment and assess knowledge base.  Interventions:  - Provide teaching at level of understanding  - Provide teaching via preferred learning methods  Outcome: Progressing

## 2024-01-27 NOTE — WOUND OSTOMY CARE
"Progress Note - Wound   Joaquin Frankel 43 y.o. male MRN: [id number]  Unit/Bed#: 2 [place] [address] Encounter: [id number]      Assessment:   This is a 43 year old male patient well known to this wound RN, admitted on 1/25/24 with sepsis. Consult received for bilateral lymphedema with cellulitis, wounds in lower abdomen and groin from hidradenitis suppurativa. Patient denied having any open areas and stated that they are all healed.     Assessment Findings:  1-Full thickness wound to left upper/medial thigh visible and was left uncovered. Patient stated that he is refusing to have wound dressed \"because it was not helping\". Patient taking Humira at this time and it was explained to him that it is not good to keep wounds uncovered. Patient continued to refuse care.  2-Drainage noted on patient's underwear, possibly due to groin wounds - patient refused care. Dr Horne notified.  3-Lymphedema to left lower leg with no open areas. Notified Dr Horne that patient would be agreeable to have ambulatory referral made to Lymphedema clinic.    Plan:   Skin care plans:  1-Hydraguard to bilateral sacrum, buttock and heels BID and PRN  2-Float heels on 2 pillows to offload pressure so heels are not in contact with mattress or pillows.  3-Ehob pressure redistribution cushion in chair when out of bed. Limit prolonged sitting 4-Moisturize skin daily with skin nourishing cream.  5-Encourage patient to turn/reposition q2h or when medically stable for pressure re-distribution on skin.  6-Wound care signing off    Wound 03/07/22 Surgical Leg Left;Upper;Medial (Active)   Wound Image   01/26/24 1109   Wound Description Granulation tissue;Pink;Slough;Yellow 01/26/24 1109   Sarika-wound Assessment Maceration 01/26/24 1109   Wound Length (cm) 1.5 cm 01/26/24 1109   Wound Width (cm) 4 cm 01/26/24 1109   Wound Depth (cm) 0.2 cm 01/26/24 1109   Wound Surface Area (cm^2) 6 cm^2 01/26/24 1109   Wound Volume (cm^3) 1.2 cm^3 01/26/24 1109 "   Calculated Wound Volume (cm^3) 1.2 cm^3 01/26/24 1109   Change in Wound Size % 79.59 01/26/24 1109   Drainage Amount None 01/26/24 1109   Non-staged Wound Description Full thickness 01/26/24 1109   Treatments Other (Comment) 01/26/24 1109   Dressing Status Other (Comment) 01/26/24 1109     Discussed assessment findings, and plan of care/recommendations with Daya QUINN & Dr Horne.    Wound care signing off, please call or tiger text with questions and concerns.    Recommendations written as orders.  Myra Corea MSN, RN, CWON

## 2024-01-27 NOTE — PROGRESS NOTES
Daily Progress Note - [facility]  Family Medicine Residency  Joaquin Frankel 43 y.o. male MRN: [id number]  Unit/Bed#: [address] [place] [address] Encounter: [id number]  Admitting Physician: Dennis Waterman MD   PCP: Luc Casas MD  Date of Admission:  1/25/2024  6:53 PM    Assessment and Plan    * Sepsis (HCC)  Assessment & Plan  Present on admission as evidenced by fever, tachycardia, and tachypnea associated with leukocytosis. Likely secondary to cellulitis of left lower extremity. LA wnl. Procal and CRP elevated.     ER Course: Vancomycin 2g IV, Toradol 15 mg IV, Zofran 4 mg IV and 500 mL NS bolus    1/26: Leukocytosis resolved. Cultures pending. No additional fevers or discharge from wound. Pain still poorly controlled, will optimize and evaluate for other sources of pain. ProCal 4.81  1/27 : Blood cultures negative; patient still complains of pain in LLE    Plan  Continue Ancef 2 gm IV   Normal saline 125 cc/hr  Follow up UA and Urine Cx  Trend CBC, procalcitonin  Monitor hemodynamics    Cellulitis of left lower extremity  Assessment & Plan  Acute    Presented with erythema, edema, warmth, and tenderness of left lower extremity, after trauma. Patient reports he dropped a couch on his left ankle 3 days ago while moving furniture.    Labs on admission showed leukocytosis, fever, and tachycardia. Received Vancomycin 2g IV in the ED and Toradol 15 mg IV  Of note patient has had multiple admissions in the past for cellulitis and also has history of chronic lymphedema.     1/26: Patient reports pain is still poorly controlled. Received Toradol, Tylenol, and Dilaudid 0.5 mg x 1. Will optimize pain regimen with regards to poor renal function.  1/27 : Blood cultures negative    Plan  Continue Empiric Ancef 2 gm IV q8h  X-ray left ankle and Doppler LLE negative  Optimize electrolytes, mild hypokalemia  Elevation of the affected limb  Monitor fever curve and hemodynamics  Continue wound care   Pain control with  Tylenol 975 mg scheduled, Dilaudid 0.5 mg PRN for severe pain, 0.2 mg for breakthrough. Avoid NSAID's due to poor GFR < 60.     Type 2 diabetes mellitus with diabetic polyneuropathy, without long-term current use of insulin ([facility])  Assessment & Plan  Lab Results   Component Value Date    HGBA1C 11.9 (H) 10/07/2023       Recent Labs     01/25/24  1933 01/25/24  2103   POCGLU 178* 168*       Chronic, uncontrolled. Home regimen includes Metformin 1000 mg BID and Victoza injection    Plan  A1c 12.1; patient needs better glycemic control  Hold home Victoza  and metformin   Starting pt on ISS algorithm 4 and Accuchecks ACHS  Hypoglycemia protocol  Carbohydrate controlled diet  Monitor blood sugars closely         Hyponatremia  Assessment & Plan  Improving     Na on admission 127. Likely hypovolemic hyponatremia from dehydration due to diarrhea and vomiting.  Pt had diarrhea and vomiting initially when he sustained injury to left leg, no further episodes since admission    1/26: Na 130, continue IV fluids, trend BMP    Plan  Trend BMP daily  IVF  cc/hr  F/u urine sodium and urine osmolality    CLOVIS (acute kidney injury) ([facility])  Assessment & Plan  Acute    Cr 1.59 on admission (Baseline 1-1.2). Likey prerenal CLOVIS in the setting of vomiting and diarrhea about 2 days prior to admission  No prior history of CKD.    1/26: Cr 1.66, GFR 50. Of note, did receive IV toradol on admission. Will avoid NSAID's and other nephrotoxins.    Plan  Monitor BMP daily  Hold nephrotoxic medications including lisinopril and metformin  Continue with IVF hydration  Strict input and output monitoring  Avoid hypotension      Lymphedema of both lower extremities  Assessment & Plan  Chronic  Pt has B/L Lymphedema in lower extremities, Left worse than right    Plan  Continue to elevate legs  Wear compression stockings  Continue outpatient Lymphedema massages  Follow up with wound care outpatient  Pt needs better glycemic control  Continue Zinc  supplementation    History of hidradenitis suppurativa  Assessment & Plan  Chronic, stable.   Patient follows with wound care for hidradenitis suppurativa. He has started biweekly Humira injections as ordered by his dermatologist. On examination, patient has induration, scarring and wounds present present in groin and abdominal pannus.  No discharge from wounds    Plan  Follow up with wound care  Continue with Humira outpatient    Essential hypertension  Assessment & Plan  Chronic, stable. SBP on admission 110-120.     Plan  Hold Lisinopril due to CLOVIS  Continue Amlodipine and Coreg with appropriate hold parameters  Monitor BP and vitals daily    Dyslipidemia  Assessment & Plan  Chronic, stable    Plan  Continue home Atorvastatin 80 mg daily    Coronary artery disease  Assessment & Plan  Patient with a history of cardiac catheterization in 2010 and 2020. Stents placed in [facility] in 2020, 2 total. Follows with cardiology outpatient. Denies any chest pain and shortness of breath on admission.  Home meds include Aspirin 81 mg daily and Carvedilol 6.25 mg BID    Plan  Continue home meds Aspirin and Coreg with hold parameters        VTE Pharmacologic Prophylaxis: VTE Score: 7 Moderate Risk (Score 3-4) - Pharmacological DVT Prophylaxis Ordered: heparin.    Patient Centered Rounds: I have performed bedside rounds with nursing staff today.    Discussions with Specialists or Other Care Team Provider: No    Education and Discussions with Family / Patient: Yes  Patient Information Sharing:  Joaquin Frankel was notified today by inpatient team of the patient’s condition and current plan.  All questions answered.     Time Spent for Care: 20 minutes.  More than 50% of total time spent on counseling and coordination of care as described above.    Current Length of Stay: 0 day(s)    Current Patient Status: Observation   Certification Statement: The patient will continue to require additional inpatient hospital stay due to management  of cellulitis    Discharge Plan: Pending    Code Status: Level 1 - Full Code    Subjective:   Was seen and examined at bedside.  He reports that he no longer has any diaphoresis, nausea, diarrhea or body aches.  His appetite has improved but he still complains of pain in his left lower extremity.  He reports that there is slight improvement with the pain medicine but is still present.  He has not had a bowel movement yet.  Denies any other complaints.     Objective     Objective:   Vitals:   Temp (24hrs), Av °F (36.7 °C), Min:97.6 °F (36.4 °C), Max:98.4 °F (36.9 °C)    Temp:  [97.6 °F (36.4 °C)-98.4 °F (36.9 °C)] 97.7 °F (36.5 °C)  HR:  [78-88] 82  Resp:  [18-19] 19  BP: (117-122)/(60-81) 120/64  SpO2:  [90 %-94 %] 94 %  Body mass index is 58.7 kg/m².     Input and Output Summary (last 24 hours):       Intake/Output Summary (Last 24 hours) at 2024 0435  Last data filed at 2024 0411  Gross per 24 hour   Intake 1566.67 ml   Output 1200 ml   Net 366.67 ml       Physical Exam:   Physical Exam  Constitutional:       General: He is not in acute distress.  Eyes:      Pupils: Pupils are equal, round, and reactive to light.   Cardiovascular:      Rate and Rhythm: Normal rate and regular rhythm.      Pulses: Normal pulses.      Heart sounds: No murmur heard.     No friction rub. No gallop.   Pulmonary:      Effort: Pulmonary effort is normal. No respiratory distress.      Breath sounds: Normal breath sounds. No wheezing.   Abdominal:      General: Bowel sounds are normal. There is no distension.      Palpations: Abdomen is soft. There is no mass.      Tenderness: There is no abdominal tenderness.   Musculoskeletal:      Right lower leg: Edema present.      Left lower leg: Swelling and tenderness present. Edema present.      Comments: Erythema of entire left leg up to intertriginous area with indistinct margins,   Erythema has improved compared yesterdaywarm to touch, tenderness on palpation  Abrasion near left  ankle with no purulent discharge     Neurological:      General: No focal deficit present.      Mental Status: He is alert and oriented to person, place, and time.       Additional Data:     Labs:  Results from last 7 days   Lab Units 01/26/24  0520   WBC Thousand/uL 9.52   HEMOGLOBIN g/dL 11.4*   HEMATOCRIT % 36.2*   PLATELETS Thousands/uL 267   NEUTROS PCT % 83*   LYMPHS PCT % 8*   MONOS PCT % 8   EOS PCT % 0     Results from last 7 days   Lab Units 01/26/24  0520   POTASSIUM mmol/L 3.4*   CHLORIDE mmol/L 96   CO2 mmol/L 27   BUN mg/dL 33*   CREATININE mg/dL 1.66*   CALCIUM mg/dL 7.7*   ALK PHOS U/L 74   ALT U/L 11   AST U/L 17         Results from last 7 days   Lab Units 01/26/24  2110 01/26/24  1614 01/26/24  1101 01/26/24  0710 01/25/24  2103   POC GLUCOSE mg/dl 232* 216* 212* 184* 168*     Results from last 7 days   Lab Units 01/26/24  0520   HEMOGLOBIN A1C % 12.1*       * I Have Reviewed All Lab Data Listed Above.  * Additional Pertinent Lab Tests Reviewed: All Labs For Current Hospital Admission Reviewed    Imaging:    Imaging Reports Reviewed Today Include: XR ankle and VAS venous duplex lower limb  Imaging Personally Reviewed by Myself Includes:      Recent Cultures (last 7 days):     Results from last 7 days   Lab Units 01/25/24  1934   BLOOD CULTURE  No Growth at 24 hrs.  No Growth at 24 hrs.       Last 24 Hours Medication List:   Current Facility-Administered Medications   Medication Dose Route Frequency Provider Last Rate    acetaminophen  975 mg Oral Q8H [facility] Wilman Pino MD      amLODIPine  10 mg Oral Daily Wilman Pino MD      aspirin  81 mg Oral Daily Reji Wihpple MD      atorvastatin  80 mg Oral Daily Reji Whipple MD      calcium carbonate  1,000 mg Oral Daily PRN Reji Whipple MD      carvedilol  6.25 mg Oral BID With Meals Reji Whipple MD      cefazolin  2,000 mg Intravenous Q8H Reji Whipple MD 2,000 mg (01/26/24 2138)    heparin (porcine)  7,500 Units  Subcutaneous Q8H [facility]IA Reji Whipple MD      HYDROmorphone  0.5 mg Intravenous Q6H PRN Wilman Pino MD      insulin lispro  2-12 Units Subcutaneous 4x Daily (AC & HS) Reji Whipple MD      ondansetron  4 mg Intravenous Q6H PRN Reji Whipple MD      sodium chloride  125 mL/hr Intravenous Continuous Reji Whipple  mL/hr (01/27/24 0411)    zinc sulfate  220 mg Oral Daily Reji Whipple MD               ** Please Note: Dictation voice to text software may have been used in the creation of this document. **    Reji Whipple MD  01/27/24  4:35 AM

## 2024-01-27 NOTE — PLAN OF CARE
Problem: PAIN - ADULT  Goal: Verbalizes/displays adequate comfort level or baseline comfort level  Description: Interventions:  - Encourage patient to monitor pain and request assistance  - Assess pain using appropriate pain scale  - Administer analgesics based on type and severity of pain and evaluate response  - Implement non-pharmacological measures as appropriate and evaluate response  - Consider cultural and social influences on pain and pain management  - Notify physician/advanced practitioner if interventions unsuccessful or patient reports new pain  Outcome: Progressing     Problem: INFECTION - ADULT  Goal: Absence or prevention of progression during hospitalization  Description: INTERVENTIONS:  - Assess and monitor for signs and symptoms of infection  - Monitor lab/diagnostic results  - Monitor all insertion sites, i.e. indwelling lines, tubes, and drains  - Monitor endotracheal if appropriate and nasal secretions for changes in amount and color  - [place] appropriate cooling/warming therapies per order  - Administer medications as ordered  - Instruct and encourage patient and family to use good hand hygiene technique  - Identify and instruct in appropriate isolation precautions for identified infection/condition  Outcome: Progressing  Goal: Absence of fever/infection during neutropenic period  Description: INTERVENTIONS:  - Monitor WBC    Outcome: Progressing     Problem: SAFETY ADULT  Goal: Patient will remain free of falls  Description: INTERVENTIONS:  - Educate patient/family on patient safety including physical limitations  - Instruct patient to call for assistance with activity   - Consult OT/PT to assist with strengthening/mobility   - Keep Call bell within reach  - Keep bed low and locked with side rails adjusted as appropriate  - Keep care items and personal belongings within reach  - Initiate and maintain comfort rounds  - Make Fall Risk Sign visible to staff  - Offer Toileting every 2 Hours,  in advance of need  - Initiate/Maintain bed alarm  - Apply yellow socks and bracelet for high fall risk patients  - Consider moving patient to room near nurses station  Outcome: Progressing  Goal: Maintain or return to baseline ADL function  Description: INTERVENTIONS:  -  Assess patient's ability to carry out ADLs; assess patient's baseline for ADL function and identify physical deficits which impact ability to perform ADLs (bathing, care of mouth/teeth, toileting, grooming, dressing, etc.)  - Assess/evaluate cause of self-care deficits   - Assess range of motion  - Assess patient's mobility; develop plan if impaired  - Assess patient's need for assistive devices and provide as appropriate  - Encourage maximum independence but intervene and supervise when necessary  - Involve family in performance of ADLs  - Assess for home care needs following discharge   - Consider OT consult to assist with ADL evaluation and planning for discharge  - Provide patient education as appropriate  Outcome: Progressing  Goal: Maintains/Returns to pre admission functional level  Description: INTERVENTIONS:  - Perform AM-PAC 6 Click Basic Mobility/ Daily Activity assessment daily.  - Set and communicate daily mobility goal to care team and patient/family/caregiver.   - Collaborate with rehabilitation services on mobility goals if consulted  - Perform Range of Motion3 times a day.  - Reposition patient every 2 hours.  - Dangle patient 3 times a day  - Stand patient 3 times a day  - Ambulate patient 3 times a day  - Out of bed to chair 3 times a day   - Out of bed for meals 3 times a day  - Out of bed for toileting  - Record patient progress and toleration of activity level   Outcome: Progressing     Problem: DISCHARGE PLANNING  Goal: Discharge to home or other facility with appropriate resources  Description: INTERVENTIONS:  - Identify barriers to discharge w/patient and caregiver  - Arrange for needed discharge resources and  transportation as appropriate  - Identify discharge learning needs (meds, wound care, etc.)  - Arrange for interpretive services to assist at discharge as needed  - Refer to Case Management Department for coordinating discharge planning if the patient needs post-hospital services based on physician/advanced practitioner order or complex needs related to functional status, cognitive ability, or social support system  Outcome: Progressing     Problem: Knowledge Deficit  Goal: Patient/family/caregiver demonstrates understanding of disease process, treatment plan, medications, and discharge instructions  Description: Complete learning assessment and assess knowledge base.  Interventions:  - Provide teaching at level of understanding  - Provide teaching via preferred learning methods  Outcome: Progressing

## 2024-01-27 NOTE — DISCHARGE INSTR - OTHER ORDERS
Plan:   Skin care plans:    1-Hydraguard to bilateral sacrum, buttock and heels 2x/day and as needed.  2-Float heels on 2 pillows to offload pressure so heels are not in contact with mattress or pillows.  3-Use pressure redistribution cushion in chair when out of bed. Limit prolonged sitting 4-Moisturize skin daily with skin nourishing cream.  5-Turn/reposition q2h or when medically stable for pressure re-distribution on skin.

## 2024-01-28 LAB
ALBUMIN SERPL BCP-MCNC: 2.8 G/DL (ref 3.5–5)
ALP SERPL-CCNC: 174 U/L (ref 34–104)
ALT SERPL W P-5'-P-CCNC: 15 U/L (ref 7–52)
ANION GAP SERPL CALCULATED.3IONS-SCNC: 7 MMOL/L
AST SERPL W P-5'-P-CCNC: 23 U/L (ref 13–39)
BASOPHILS # BLD AUTO: 0.03 THOUSANDS/ÂΜL (ref 0–0.1)
BASOPHILS NFR BLD AUTO: 0 % (ref 0–1)
BILIRUB SERPL-MCNC: 0.29 MG/DL (ref 0.2–1)
BUN SERPL-MCNC: 26 MG/DL (ref 5–25)
CALCIUM ALBUM COR SERPL-MCNC: 8.6 MG/DL (ref 8.3–10.1)
CALCIUM SERPL-MCNC: 7.6 MG/DL (ref 8.4–10.2)
CHLORIDE SERPL-SCNC: 99 MMOL/L (ref 96–108)
CO2 SERPL-SCNC: 25 MMOL/L (ref 21–32)
CREAT SERPL-MCNC: 1.25 MG/DL (ref 0.6–1.3)
EOSINOPHIL # BLD AUTO: 0.07 THOUSAND/ÂΜL (ref 0–0.61)
EOSINOPHIL NFR BLD AUTO: 1 % (ref 0–6)
ERYTHROCYTE [DISTWIDTH] IN BLOOD BY AUTOMATED COUNT: 13.2 % (ref 11.6–15.1)
GFR SERPL CREATININE-BSD FRML MDRD: 70 ML/MIN/1.73SQ M
GLUCOSE SERPL-MCNC: 133 MG/DL (ref 65–140)
GLUCOSE SERPL-MCNC: 143 MG/DL (ref 65–140)
GLUCOSE SERPL-MCNC: 197 MG/DL (ref 65–140)
GLUCOSE SERPL-MCNC: 209 MG/DL (ref 65–140)
GLUCOSE SERPL-MCNC: 223 MG/DL (ref 65–140)
HCT VFR BLD AUTO: 31.9 % (ref 36.5–49.3)
HGB BLD-MCNC: 10.4 G/DL (ref 12–17)
IMM GRANULOCYTES # BLD AUTO: 0.13 THOUSAND/UL (ref 0–0.2)
IMM GRANULOCYTES NFR BLD AUTO: 2 % (ref 0–2)
LYMPHOCYTES # BLD AUTO: 1.06 THOUSANDS/ÂΜL (ref 0.6–4.47)
LYMPHOCYTES NFR BLD AUTO: 15 % (ref 14–44)
MAGNESIUM SERPL-MCNC: 2.2 MG/DL (ref 1.9–2.7)
MCH RBC QN AUTO: 27.5 PG (ref 26.8–34.3)
MCHC RBC AUTO-ENTMCNC: 32.6 G/DL (ref 31.4–37.4)
MCV RBC AUTO: 84 FL (ref 82–98)
MONOCYTES # BLD AUTO: 0.63 THOUSAND/ÂΜL (ref 0.17–1.22)
MONOCYTES NFR BLD AUTO: 9 % (ref 4–12)
NEUTROPHILS # BLD AUTO: 5.05 THOUSANDS/ÂΜL (ref 1.85–7.62)
NEUTS SEG NFR BLD AUTO: 73 % (ref 43–75)
NRBC BLD AUTO-RTO: 0 /100 WBCS
PLATELET # BLD AUTO: 249 THOUSANDS/UL (ref 149–390)
PMV BLD AUTO: 9.4 FL (ref 8.9–12.7)
POTASSIUM SERPL-SCNC: 4.2 MMOL/L (ref 3.5–5.3)
PROT SERPL-MCNC: 7 G/DL (ref 6.4–8.4)
RBC # BLD AUTO: 3.78 MILLION/UL (ref 3.88–5.62)
SODIUM SERPL-SCNC: 131 MMOL/L (ref 135–147)
WBC # BLD AUTO: 6.97 THOUSAND/UL (ref 4.31–10.16)

## 2024-01-28 PROCEDURE — 85025 COMPLETE CBC W/AUTO DIFF WBC: CPT

## 2024-01-28 PROCEDURE — 82948 REAGENT STRIP/BLOOD GLUCOSE: CPT

## 2024-01-28 PROCEDURE — 99232 SBSQ HOSP IP/OBS MODERATE 35: CPT | Performed by: INTERNAL MEDICINE

## 2024-01-28 PROCEDURE — 80053 COMPREHEN METABOLIC PANEL: CPT

## 2024-01-28 PROCEDURE — 83735 ASSAY OF MAGNESIUM: CPT

## 2024-01-28 RX ORDER — OXYCODONE HYDROCHLORIDE AND ACETAMINOPHEN 5; 325 MG/1; MG/1
1 TABLET ORAL EVERY 4 HOURS PRN
Status: DISCONTINUED | OUTPATIENT
Start: 2024-01-28 | End: 2024-01-30

## 2024-01-28 RX ORDER — HYDROMORPHONE HCL/PF 1 MG/ML
0.5 SYRINGE (ML) INJECTION EVERY 6 HOURS PRN
Status: DISCONTINUED | OUTPATIENT
Start: 2024-01-28 | End: 2024-01-28

## 2024-01-28 RX ORDER — HYDROMORPHONE HCL/PF 1 MG/ML
0.5 SYRINGE (ML) INJECTION EVERY 4 HOURS PRN
Status: DISCONTINUED | OUTPATIENT
Start: 2024-01-28 | End: 2024-01-30

## 2024-01-28 RX ORDER — GLIMEPIRIDE 2 MG/1
2 TABLET ORAL
Status: DISCONTINUED | OUTPATIENT
Start: 2024-01-28 | End: 2024-01-30 | Stop reason: HOSPADM

## 2024-01-28 RX ORDER — GLIMEPIRIDE 2 MG/1
2 TABLET ORAL
Status: DISCONTINUED | OUTPATIENT
Start: 2024-01-28 | End: 2024-01-28

## 2024-01-28 RX ADMIN — METFORMIN HYDROCHLORIDE 1000 MG: 500 TABLET ORAL at 16:45

## 2024-01-28 RX ADMIN — OXYCODONE HYDROCHLORIDE AND ACETAMINOPHEN 1 TABLET: 5; 325 TABLET ORAL at 22:10

## 2024-01-28 RX ADMIN — HEPARIN SODIUM 7500 UNITS: 5000 INJECTION INTRAVENOUS; SUBCUTANEOUS at 14:28

## 2024-01-28 RX ADMIN — GLIMEPIRIDE 2 MG: 2 TABLET ORAL at 11:37

## 2024-01-28 RX ADMIN — CEFAZOLIN SODIUM 2000 MG: 2 SOLUTION INTRAVENOUS at 14:28

## 2024-01-28 RX ADMIN — CARVEDILOL 6.25 MG: 6.25 TABLET, FILM COATED ORAL at 07:39

## 2024-01-28 RX ADMIN — ASPIRIN 81 MG CHEWABLE TABLET 81 MG: 81 TABLET CHEWABLE at 08:09

## 2024-01-28 RX ADMIN — HEPARIN SODIUM 7500 UNITS: 5000 INJECTION INTRAVENOUS; SUBCUTANEOUS at 21:28

## 2024-01-28 RX ADMIN — ZINC SULFATE 220 MG (50 MG) CAPSULE 220 MG: CAPSULE at 08:15

## 2024-01-28 RX ADMIN — ATORVASTATIN CALCIUM 80 MG: 80 TABLET, FILM COATED ORAL at 08:09

## 2024-01-28 RX ADMIN — OXYCODONE HYDROCHLORIDE AND ACETAMINOPHEN 1 TABLET: 5; 325 TABLET ORAL at 08:09

## 2024-01-28 RX ADMIN — HYDROMORPHONE HYDROCHLORIDE 0.5 MG: 1 INJECTION, SOLUTION INTRAMUSCULAR; INTRAVENOUS; SUBCUTANEOUS at 00:44

## 2024-01-28 RX ADMIN — CARVEDILOL 6.25 MG: 6.25 TABLET, FILM COATED ORAL at 16:45

## 2024-01-28 RX ADMIN — METFORMIN HYDROCHLORIDE 1000 MG: 500 TABLET ORAL at 09:42

## 2024-01-28 RX ADMIN — OXYCODONE HYDROCHLORIDE AND ACETAMINOPHEN 1 TABLET: 5; 325 TABLET ORAL at 16:49

## 2024-01-28 RX ADMIN — HEPARIN SODIUM 7500 UNITS: 5000 INJECTION INTRAVENOUS; SUBCUTANEOUS at 05:35

## 2024-01-28 RX ADMIN — AMLODIPINE BESYLATE 10 MG: 10 TABLET ORAL at 08:09

## 2024-01-28 RX ADMIN — INSULIN LISPRO 4 UNITS: 100 INJECTION, SOLUTION INTRAVENOUS; SUBCUTANEOUS at 07:39

## 2024-01-28 RX ADMIN — CEFAZOLIN SODIUM 2000 MG: 2 SOLUTION INTRAVENOUS at 05:35

## 2024-01-28 RX ADMIN — INSULIN GLARGINE 20 UNITS: 100 INJECTION, SOLUTION SUBCUTANEOUS at 21:27

## 2024-01-28 RX ADMIN — CEFAZOLIN SODIUM 2000 MG: 2 SOLUTION INTRAVENOUS at 22:03

## 2024-01-28 RX ADMIN — HYDROMORPHONE HYDROCHLORIDE 0.5 MG: 1 INJECTION, SOLUTION INTRAMUSCULAR; INTRAVENOUS; SUBCUTANEOUS at 06:13

## 2024-01-28 RX ADMIN — OXYCODONE HYDROCHLORIDE AND ACETAMINOPHEN 1 TABLET: 5; 325 TABLET ORAL at 12:38

## 2024-01-28 RX ADMIN — HYDROMORPHONE HYDROCHLORIDE 0.5 MG: 1 INJECTION, SOLUTION INTRAMUSCULAR; INTRAVENOUS; SUBCUTANEOUS at 23:57

## 2024-01-28 RX ADMIN — HYDROMORPHONE HYDROCHLORIDE 0.5 MG: 1 INJECTION, SOLUTION INTRAMUSCULAR; INTRAVENOUS; SUBCUTANEOUS at 15:32

## 2024-01-28 RX ADMIN — INSULIN LISPRO 2 UNITS: 100 INJECTION, SOLUTION INTRAVENOUS; SUBCUTANEOUS at 11:37

## 2024-01-28 RX ADMIN — INSULIN LISPRO 5 UNITS: 100 INJECTION, SOLUTION INTRAVENOUS; SUBCUTANEOUS at 07:39

## 2024-01-28 RX ADMIN — HYDROMORPHONE HYDROCHLORIDE 0.5 MG: 1 INJECTION, SOLUTION INTRAMUSCULAR; INTRAVENOUS; SUBCUTANEOUS at 19:31

## 2024-01-28 RX ADMIN — HYDROMORPHONE HYDROCHLORIDE 0.5 MG: 1 INJECTION, SOLUTION INTRAMUSCULAR; INTRAVENOUS; SUBCUTANEOUS at 10:43

## 2024-01-28 NOTE — PLAN OF CARE
Problem: PAIN - ADULT  Goal: Verbalizes/displays adequate comfort level or baseline comfort level  Description: Interventions:  - Encourage patient to monitor pain and request assistance  - Assess pain using appropriate pain scale  - Administer analgesics based on type and severity of pain and evaluate response  - Implement non-pharmacological measures as appropriate and evaluate response  - Consider cultural and social influences on pain and pain management  - Notify physician/advanced practitioner if interventions unsuccessful or patient reports new pain  Outcome: Progressing     Problem: INFECTION - ADULT  Goal: Absence or prevention of progression during hospitalization  Description: INTERVENTIONS:  - Assess and monitor for signs and symptoms of infection  - Monitor lab/diagnostic results  - Monitor all insertion sites, i.e. indwelling lines, tubes, and drains  - Monitor endotracheal if appropriate and nasal secretions for changes in amount and color  - [place] appropriate cooling/warming therapies per order  - Administer medications as ordered  - Instruct and encourage patient and family to use good hand hygiene technique  - Identify and instruct in appropriate isolation precautions for identified infection/condition  Outcome: Progressing  Goal: Absence of fever/infection during neutropenic period  Description: INTERVENTIONS:  - Monitor WBC    Outcome: Progressing     Problem: SAFETY ADULT  Goal: Patient will remain free of falls  Description: INTERVENTIONS:  - Educate patient/family on patient safety including physical limitations  - Instruct patient to call for assistance with activity   - Consult OT/PT to assist with strengthening/mobility   - Keep Call bell within reach  - Keep bed low and locked with side rails adjusted as appropriate  - Keep care items and personal belongings within reach  - Initiate and maintain comfort rounds  - Make Fall Risk Sign visible to staff  - Offer Toileting every 2 Hours,  in advance of need  -    - Apply yellow socks and bracelet for high fall risk patients  - Consider moving patient to room near nurses station  Outcome: Progressing  Goal: Maintain or return to baseline ADL function  Description: INTERVENTIONS:  -  Assess patient's ability to carry out ADLs; assess patient's baseline for ADL function and identify physical deficits which impact ability to perform ADLs (bathing, care of mouth/teeth, toileting, grooming, dressing, etc.)  - Assess/evaluate cause of self-care deficits   - Assess range of motion  - Assess patient's mobility; develop plan if impaired  - Assess patient's need for assistive devices and provide as appropriate  - Encourage maximum independence but intervene and supervise when necessary  - Involve family in performance of ADLs  - Assess for home care needs following discharge   - Consider OT consult to assist with ADL evaluation and planning for discharge  - Provide patient education as appropriate  Outcome: Completed  Goal: Maintains/Returns to pre admission functional level  Description: INTERVENTIONS:  - Perform AM-PAC 6 Click Basic Mobility/ Daily Activity assessment daily.  - Set and communicate daily mobility goal to care team and patient/family/caregiver.   - Collaborate with rehabilitation services on mobility goals if consulted    - Out of bed for toileting  - Record patient progress and toleration of activity level   Outcome: Completed     Problem: DISCHARGE PLANNING  Goal: Discharge to home or other facility with appropriate resources  Description: INTERVENTIONS:  - Identify barriers to discharge w/patient and caregiver  - Arrange for needed discharge resources and transportation as appropriate  - Identify discharge learning needs (meds, wound care, etc.)  - Arrange for interpretive services to assist at discharge as needed  - Refer to Case Management Department for coordinating discharge planning if the patient needs post-hospital services based on  physician/advanced practitioner order or complex needs related to functional status, cognitive ability, or social support system  Outcome: Progressing     Problem: Knowledge Deficit  Goal: Patient/family/caregiver demonstrates understanding of disease process, treatment plan, medications, and discharge instructions  Description: Complete learning assessment and assess knowledge base.  Interventions:  - Provide teaching at level of understanding  - Provide teaching via preferred learning methods  Outcome: Progressing

## 2024-01-28 NOTE — PLAN OF CARE
Problem: PAIN - ADULT  Goal: Verbalizes/displays adequate comfort level or baseline comfort level  Description: Interventions:  - Encourage patient to monitor pain and request assistance  - Assess pain using appropriate pain scale  - Administer analgesics based on type and severity of pain and evaluate response  - Implement non-pharmacological measures as appropriate and evaluate response  - Consider cultural and social influences on pain and pain management  - Notify physician/advanced practitioner if interventions unsuccessful or patient reports new pain  Outcome: Progressing     Problem: INFECTION - ADULT  Goal: Absence or prevention of progression during hospitalization  Description: INTERVENTIONS:  - Assess and monitor for signs and symptoms of infection  - Monitor lab/diagnostic results  - Monitor all insertion sites, i.e. indwelling lines, tubes, and drains  - Monitor endotracheal if appropriate and nasal secretions for changes in amount and color  - [place] appropriate cooling/warming therapies per order  - Administer medications as ordered  - Instruct and encourage patient and family to use good hand hygiene technique  - Identify and instruct in appropriate isolation precautions for identified infection/condition  Outcome: Progressing  Goal: Absence of fever/infection during neutropenic period  Description: INTERVENTIONS:  - Monitor WBC    Outcome: Progressing     Problem: SAFETY ADULT  Goal: Patient will remain free of falls  Description: INTERVENTIONS:  - Educate patient/family on patient safety including physical limitations  - Instruct patient to call for assistance with activity   - Consult OT/PT to assist with strengthening/mobility   - Keep Call bell within reach  - Keep bed low and locked with side rails adjusted as appropriate  - Keep care items and personal belongings within reach  - Initiate and maintain comfort rounds  - Make Fall Risk Sign visible to staff  - Offer Toileting every 2 Hours,  in advance of need  - Initiate/Maintain bed alarm  - Obtain necessary fall risk management equipment  - Apply yellow socks and bracelet for high fall risk patients  - Consider moving patient to room near nurses station  Outcome: Progressing  Goal: Maintain or return to baseline ADL function  Description: INTERVENTIONS:  -  Assess patient's ability to carry out ADLs; assess patient's baseline for ADL function and identify physical deficits which impact ability to perform ADLs (bathing, care of mouth/teeth, toileting, grooming, dressing, etc.)  - Assess/evaluate cause of self-care deficits   - Assess range of motion  - Assess patient's mobility; develop plan if impaired  - Assess patient's need for assistive devices and provide as appropriate  - Encourage maximum independence but intervene and supervise when necessary  - Involve family in performance of ADLs  - Assess for home care needs following discharge   - Consider OT consult to assist with ADL evaluation and planning for discharge  - Provide patient education as appropriate  Outcome: Progressing  Goal: Maintains/Returns to pre admission functional level  Description: INTERVENTIONS:  - Perform AM-PAC 6 Click Basic Mobility/ Daily Activity assessment daily.  - Set and communicate daily mobility goal to care team and patient/family/caregiver.   - Collaborate with rehabilitation services on mobility goals if consulted  - Perform Range of Motion 2 times a day.  - Reposition patient every 2 hours.  - Dangle patient 3 times a day  - Stand patient 3 times a day  - Ambulate patient 3 times a day  - Out of bed to chair 3 times a day   - Out of bed for meals 3 times a day  - Out of bed for toileting  - Record patient progress and toleration of activity level   Outcome: Progressing     Problem: DISCHARGE PLANNING  Goal: Discharge to home or other facility with appropriate resources  Description: INTERVENTIONS:  - Identify barriers to discharge w/patient and caregiver  -  Arrange for needed discharge resources and transportation as appropriate  - Identify discharge learning needs (meds, wound care, etc.)  - Arrange for interpretive services to assist at discharge as needed  - Refer to Case Management Department for coordinating discharge planning if the patient needs post-hospital services based on physician/advanced practitioner order or complex needs related to functional status, cognitive ability, or social support system  Outcome: Progressing     Problem: Knowledge Deficit  Goal: Patient/family/caregiver demonstrates understanding of disease process, treatment plan, medications, and discharge instructions  Description: Complete learning assessment and assess knowledge base.  Interventions:  - Provide teaching at level of understanding  - Provide teaching via preferred learning methods  Outcome: Progressing

## 2024-01-28 NOTE — UTILIZATION REVIEW
Observation 1/25/24 @ 1958 converted to inpatient admission 1/28/24 @ 1102 for continued care & tx for sepsis.    Admission Orders (From admission, onward)       Ordered        01/28/24 1102  Inpatient Admission  Once            01/25/24 1958  Place in Observation  Once                          Orders Placed This Encounter   Procedures    Inpatient Admission     Standing Status:   Standing     Number of Occurrences:   1     Order Specific Question:   Level of Care     Answer:   Med Surg [16]     Order Specific Question:   Estimated length of stay     Answer:   More than 2 Midnights     Order Specific Question:   Certification     Answer:   I certify that inpatient services are medically necessary for this patient for a duration of greater than two midnights. See H&P and MD Progress Notes for additional information about the patient's course of treatment.                  Current Patient Class: inpatient  Current Level of Care: med surg  HPI:43 y.o. male initially admitted on 1/28/24     Assessment/Plan:   Observation to IP admission 1/28/24:  LLE lymphedema with persistent erythema warmth and tenderness, most pronounced over left calf with induration.  Not evidence of fluctuation or crepitus. Remains on IVABT at this time.     DAY 2- 1/29/24:  IVABT in progress 2nd LLE cellulitis. Erythema of entire left leg up to intertriginous area with indistinct margins, warm to touch, tenderness on palpation. Abrasion near left ankle with no purulent discharge. IV Lasix x 1 given for lymphedema. Encourage leg elevation, compression stockings. Using IV Dilaudid, Percocet for pain control.       Vital Signs:   Date/Time Temp Pulse Resp BP MAP (mmHg) SpO2 O2 Device Patient Position - Orthostatic VS   01/28/24 07:33:55 -- 75 18 119/63 82 88 % Abnormal  None (Room air) --   01/27/24 23:36:51 99 °F (37.2 °C) 77 18 114/60 78 89 % Abnormal  -- --   01/27/24 20:07:41 98 °F (36.7 °C) 69 18 126/74 91 90 % -- --   01/27/24 15:28:11 97.8 °F  (36.6 °C) 72 18 111/63 79 90 % None (Room air) Lying   01/27/24 0808 97.8 °F (36.6 °C) 78 18 138/74 -- 90 % None (Room air) Lying   01/26/24 22:50:41 97.7 °F (36.5 °C) 82 -- 120/64 83 94 % -- --   01/26/24 20:25:47 98.1 °F (36.7 °C) 78 19 122/63 83 91 % -- --   01/26/24 16:36:26 97.6 °F (36.4 °C) 79 18 121/81 94 90 % None (Room air) Lying   01/26/24 07:41:15 98.4 °F (36.9 °C) 88 18 117/60 79 94 % None (Room air) Lying   01/26/24 03:17:58 98.2 °F (36.8 °C) 78 18 114/59 77 94 % -- --     Pertinent Labs/Diagnostic Results:   Results from last 7 days   Lab Units 01/25/24 1934   SARS-COV-2  Negative     Results from last 7 days   Lab Units 01/29/24  0614 01/28/24  0552 01/27/24  0515 01/26/24  0520 01/25/24 1934   WBC Thousand/uL 8.83 6.97 5.90 9.52 14.82*   HEMOGLOBIN g/dL 10.2* 10.4* 8.5* 11.4* 12.5   HEMATOCRIT % 31.9* 31.9* 26.9* 36.2* 37.5   PLATELETS Thousands/uL 299 249 174 267 274   NEUTROS ABS Thousands/µL 6.25 5.05 4.45 7.95* 13.13*     Results from last 7 days   Lab Units 01/29/24  0614 01/28/24  0552 01/27/24  0515 01/26/24  0520 01/25/24 1934   SODIUM mmol/L 134* 131* 133* 130* 127*   POTASSIUM mmol/L 4.3 4.2 3.5 3.4* 4.8   CHLORIDE mmol/L 101 99 103 96 93*   CO2 mmol/L 26 25 23 27 25   ANION GAP mmol/L 7 7 7 7 9   BUN mg/dL 23 26* 27* 33* 28*   CREATININE mg/dL 1.20 1.25 1.25 1.66* 1.59*   EGFR ml/min/1.73sq m 74 70 70 50 52   CALCIUM mg/dL 7.9* 7.6* 6.5* 7.7* 7.9*   MAGNESIUM mg/dL 2.1 2.2 1.5*  --   --      Results from last 7 days   Lab Units 01/29/24  0614 01/28/24  0552 01/27/24  0515 01/26/24  0520 01/25/24  1934   AST U/L 23 23 18 17 28   ALT U/L 14 15 11 11 12   ALK PHOS U/L 169* 174* 93 74 73   TOTAL PROTEIN g/dL 7.0 7.0 6.1* 7.2 7.7   ALBUMIN g/dL 2.8* 2.8* 2.4* 2.7* 3.0*   TOTAL BILIRUBIN mg/dL 0.31 0.29 0.27 0.40 0.50     Results from last 7 days   Lab Units 01/29/24  1140 01/29/24  0724 01/29/24  0223 01/28/24  2111 01/28/24  1612 01/28/24  1117 01/28/24  0728 01/27/24  2054 01/27/24  1601  01/27/24  1108 01/27/24  0722 01/26/24  2110   POC GLUCOSE mg/dl 156* 123 146* 143* 133 197* 209* 260* 260* 183* 182* 232*     Results from last 7 days   Lab Units 01/29/24  0614 01/28/24  0552 01/27/24  0515 01/26/24  0520 01/25/24 1934   GLUCOSE RANDOM mg/dL 127 223* 151* 157* 179*     Results from last 7 days   Lab Units 01/26/24  0520   HEMOGLOBIN A1C % 12.1*   EAG mg/dl 301     Results from last 7 days   Lab Units 01/27/24  0515 01/26/24  0520   PROCALCITONIN ng/ml 2.66* 4.81*     Results from last 7 days   Lab Units 01/25/24 2006   LACTIC ACID mmol/L 1.0     Results from last 7 days   Lab Units 01/26/24  0520   CRP mg/L 477.0*     Results from last 7 days   Lab Units 01/26/24  0841   OSMO UR mmol/     Results from last 7 days   Lab Units 01/26/24  0841   CLARITY UA  Turbid   COLOR UA  Dark Anais   SPEC GRAV UA  1.025   PH UA  5.5   GLUCOSE UA mg/dl 100 (1/10%)*   KETONES UA mg/dl Trace*   BLOOD UA  Large*   PROTEIN UA mg/dl 100 (2+)*   NITRITE UA  Negative   BILIRUBIN UA  Small*   UROBILINOGEN UA E.U./dl 1.0   LEUKOCYTES UA  Negative   WBC UA /hpf 4-10*   RBC UA /hpf Innumerable*   BACTERIA UA /hpf None Seen   EPITHELIAL CELLS WET PREP /hpf Occasional   SODIUM UR  22     Results from last 7 days   Lab Units 01/25/24 1934   INFLUENZA A PCR  Negative   INFLUENZA B PCR  Negative   RSV PCR  Negative     Results from last 7 days   Lab Units 01/26/24  0841 01/25/24 1934   BLOOD CULTURE   --  No Growth at 72 hrs.  No Growth at 72 hrs.   URINE CULTURE  No Growth <1000 cfu/mL  --        Medications:   Scheduled Medications:  Medications 01/20 01/21 01/22 01/23 01/24 01/25 01/26 01/27 01/28 01/29   acetaminophen (TYLENOL) tablet 975 mg  Dose: 975 mg  Freq: Every 8 hours scheduled Route: PO  Start: 01/26/24 0830 End: 01/27/24 1445          0829     1711     2335      0808     1445-D/C'd        acetaminophen (TYLENOL) tablet 975 mg  Dose: 975 mg  Freq: Once Route: PO  Start: 01/25/24 1930 End: 01/25/24 1948          1948            amLODIPine (NORVASC) tablet 10 mg  Dose: 10 mg  Freq: Daily Route: PO  Start: 01/27/24 0900   Admin Instructions:      Order specific questions:              0808      0809      0822        amLODIPine (NORVASC) tablet 10 mg  Dose: 10 mg  Freq: Daily Route: PO  Start: 01/26/24 0900 End: 01/26/24 0920   Admin Instructions:      Order specific questions:             0829     0920-D/C'd         aspirin chewable tablet 81 mg  Dose: 81 mg  Freq: Daily Route: PO  Start: 01/26/24 0900          0829      0808      0809      0822        atorvastatin (LIPITOR) tablet 80 mg  Dose: 80 mg  Freq: Daily Route: PO  Start: 01/26/24 0900          0829      0808      0809      0821        carvedilol (COREG) tablet 6.25 mg  Dose: 6.25 mg  Freq: 2 times daily with meals Route: PO  Start: 01/26/24 0730   Admin Instructions:      Order specific questions:             0829     1729      0808     1541      0739     1645      0821     1630        ceFAZolin (ANCEF) IVPB (premix in dextrose) 2,000 mg 50 mL  Dose: 2,000 mg  Freq: Every 8 hours Route: IV  Last Dose: 2,000 mg (01/29/24 0608)  Start: 01/26/24 0600   Admin Instructions:      Order specific questions:             0605     1320     2138      0548     1431     2238      0535     1428     1500     2203     2350      0608     1400     2200        ceFAZolin (ANCEF) IVPB (premix in dextrose) 2,000 mg 50 mL  Dose: 2,000 mg  Freq: Every 8 hours Route: IV  Start: 01/25/24 2130 End: 01/25/24 2138   Admin Instructions:      Order specific questions:            2138-D/C'd              furosemide (LASIX) injection 40 mg  Dose: 40 mg  Freq: Once Route: IV  Start: 01/29/24 1200 End: 01/29/24 1222   Admin Instructions:      Order specific questions:                1222        glimepiride (AMARYL) tablet 2 mg  Dose: 2 mg  Freq: Daily with breakfast Route: PO  Start: 01/28/24 1200            1137      0821        glimepiride (AMARYL) tablet 2 mg  Dose: 2 mg  Freq: Daily with  breakfast Route: PO  Start: 01/28/24 0930 End: 01/28/24 0926 0926-D/C'd       heparin (porcine) subcutaneous injection 7,500 Units  Dose: 7,500 Units  Freq: Every 8 hours scheduled Route: SC  Start: 01/25/24 2200   Admin Instructions:            2309      0600     1320     2138      0547     1431     2238      0535     1428     2128      0608     1400     2200        HYDROmorphone (DILAUDID) injection 0.5 mg  Dose: 0.5 mg  Freq: Once Route: IV  Start: 01/25/24 2145 End: 01/25/24 2200   Admin Instructions:            2200            HYDROmorphone HCl (DILAUDID) injection 0.2 mg  Dose: 0.2 mg  Freq: Once Route: IV  Start: 01/26/24 0830 End: 01/26/24 0830   Admin Instructions:             0830           insulin glargine (LANTUS) subcutaneous injection 16 Units 0.16 mL  Dose: 16 Units  Freq: Daily at bedtime Route: SC  Start: 01/29/24 2200   Admin Instructions:                2200        insulin glargine (LANTUS) subcutaneous injection 20 Units 0.2 mL  Dose: 20 Units  Freq: Daily at bedtime Route: SC  Start: 01/27/24 2200 End: 01/29/24 0824   Admin Instructions:              2232      2128      0824-D/C'd      insulin lispro (HumaLOG) 100 units/mL subcutaneous injection 1-5 Units  Dose: 1-5 Units  Freq: Daily at bedtime Route: SC  Start: 01/27/24 2200   Admin Instructions:              2238 (1523) [C]      2200         insulin lispro (HumaLOG) 100 units/mL subcutaneous injection 2-12 Units  Dose: 2-12 Units  Freq: 3 times daily with meals Route: SC  Start: 01/27/24 1200   Admin Instructions:              1219     1614 [C]      0722     1138 (5427) (0195) [id number]         insulin lispro (HumaLOG) 100 units/mL subcutaneous injection 2-12 Units  Dose: 2-12 Units  Freq: 4 times daily (before meals and at bedtime) Route: SC  Start: 01/25/24 2200 End: 01/27/24 0925   Admin Instructions:            231      0869     1207 [C]     1710 [C]     2137      0803     0925-D/C'd        insulin lispro  (HumaLOG) 100 units/mL subcutaneous injection 5 Units  Dose: 5 Units  Freq: 3 times daily with meals Route: SC  Start: 01/27/24 1200 End: 01/28/24 0923   Admin Instructions:              1214     1615      0739     0923-D/C'd       ketorolac (TORADOL) injection 15 mg  Dose: 15 mg  Freq: Once Route: IV  Start: 01/25/24 1915 End: 01/25/24 1949   Admin Instructions:            1949            magnesium sulfate 2 g/50 mL IVPB (premix) 2 g  Dose: 2 g  Freq: Once Route: IV  Last Dose: Stopped (01/28/24 0700)  Start: 01/27/24 0830 End: 01/28/24 0700   Admin Instructions:              1049      0700         metFORMIN (GLUCOPHAGE) tablet 1,000 mg  Dose: 1,000 mg  Freq: 2 times daily with meals Route: PO  Start: 01/28/24 0930   Admin Instructions:               0942     1645      0821     1630        ondansetron (ZOFRAN) injection 4 mg  Dose: 4 mg  Freq: Once Route: IV  Start: 01/25/24 2000 End: 01/25/24 1958   Admin Instructions:            1958            polyethylene glycol (MIRALAX) packet 17 g  Dose: 17 g  Freq: Daily Route: PO  Start: 01/29/24 0900   Admin Instructions:                0831         potassium chloride (Klor-Con M20) CR tablet 40 mEq  Dose: 40 mEq  Freq: Once Route: PO  Start: 01/26/24 0645 End: 01/26/24 0828   Admin Instructions:             0828           Followed by  potassium chloride (Klor-Con M20) CR tablet 20 mEq  Dose: 20 mEq  Freq: Once Route: PO  Start: 01/26/24 0645 End: 01/26/24 0645   Admin Instructions:             0645           potassium chloride (Klor-Con M20) CR tablet 40 mEq  Dose: 40 mEq  Freq: Once Route: PO  Start: 01/27/24 0830 End: 01/27/24 1046   Admin Instructions:              1046          sodium chloride 0.9 % bolus 500 mL  Dose: 500 mL  Freq: Once Route: IV  Last Dose: Stopped (01/26/24 0837)  Start: 01/25/24 1915 End: 01/26/24 0837 1952 0837           vancomycin (VANCOCIN) 2,000 mg in sodium chloride 0.9 % 500 mL IVPB  Dose: 2,000 mg  Freq: Once Route: IV  Last  Dose: Stopped (01/26/24 0837)  Start: 01/25/24 1915 End: 01/26/24 0837   Admin Instructions:      Order specific questions:            1944      0837           zinc sulfate (ZINCATE) capsule 220 mg  Dose: 220 mg  Freq: Daily Route: PO  Start: 01/26/24 0900          0829      0808      0815      0822        Legend:       [id number]/2101/2201/2301/2401/2501/2601/2701/2801/29        Continuous Meds Sorted by Name  for Joaquin Frankel as of 01/20/24 through 1/29/24  Legend:       Medications 01/20 01/21 01/22 01/23 01/24 01/25 01/26 01/27 01/28 01/29   sodium chloride 0.9 % infusion  Rate: 125 mL/hr Dose: 125 mL/hr  Freq: Continuous Route: IV  Last Dose: Stopped (01/27/24 1051)  Start: 01/25/24 2115 End: 01/27/24 0917         2202     2207      0608     2027      0411     0917-D/C'd  1051          Legend:       [id number]/2001/2101/2201/2301/2401/2501/2601/2701/2801/29        PRN Meds Sorted by Name  for Joaquin Frankel as of 01/20/24 through 1/29/24  Legend:       Medications 01/20 01/21 01/22 01/23 01/24 01/25 01/26 01/27 01/28 01/29   acetaminophen (TYLENOL) tablet 650 mg  Dose: 650 mg  Freq: Every 8 hours PRN Route: PO  PRN Reason: mild pain  Start: 01/27/24 1500                acetaminophen (TYLENOL) tablet 650 mg  Dose: 650 mg  Freq: Every 6 hours PRN Route: PO  PRN Reason: mild pain  Indications of Use: FEVER,HEADACHE,MILD PAIN  Start: 01/25/24 2113 End: 01/26/24 0654          0028     0654-D/C'd         calcium carbonate (TUMS) chewable tablet 1,000 mg  Dose: 1,000 mg  Freq: Daily PRN Route: PO  PRN Reasons: indigestion,heartburn  Start: 01/25/24 2113   Admin Instructions:             0618           HYDROmorphone (DILAUDID) injection 0.5 mg  Dose: 0.5 mg  Freq: Every 4 hours PRN Route: IV  PRN Reasons: severe pain,breakthrough pain  Indications of Use: MODERATE TO SEVERE PAIN  Start: 01/28/24 0555   Admin Instructions:               0613     1043     1532     1931     2357      0336     1045         HYDROmorphone (DILAUDID) injection 0.5 mg  Dose: 0.5 mg  Freq: Every 6 hours PRN Route: IV  PRN Reason: severe pain  Indications of Use: MODERATE TO SEVERE PAIN  Start: 01/28/24 0545 End: 01/28/24 0555   Admin Instructions:               0555-D/C'd       HYDROmorphone (DILAUDID) injection 0.5 mg  Dose: 0.5 mg  Freq: Every 6 hours PRN Route: IV  PRN Reason: severe pain  Start: 01/27/24 1100 End: 01/28/24 0546   Admin Instructions:              1113     1843      0044     0546-D/C'd       HYDROmorphone (DILAUDID) injection 0.5 mg  Dose: 0.5 mg  Freq: Every 6 hours PRN Route: IV  PRN Reason: severe pain  Start: 01/26/24 1430 End: 01/27/24 1100   Admin Instructions:             (1408) [C]     1441     2315      0547     1100-D/C'd        HYDROmorphone HCl (DILAUDID) injection 0.2 mg  Dose: 0.2 mg  Freq: Once as needed Route: IV  PRN Reason: breakthrough pain  Start: 01/26/24 1430 End: 01/27/24 0417   Admin Instructions:              0417          HYDROmorphone HCl (DILAUDID) injection 0.2 mg  Dose: 0.2 mg  Freq: Every 6 hours PRN Route: IV  PRN Reason: severe pain  Start: 01/26/24 1430 End: 01/26/24 0919   Admin Instructions:             0829     0919-D/C'd         HYDROmorphone HCl (DILAUDID) injection 0.2 mg  Dose: 0.2 mg  Freq: Every 6 hours PRN Route: IV  PRN Reason: severe pain  Start: 01/26/24 0657 End: 01/26/24 0822   Admin Instructions:             0822-D/C'd  (1314) [C]           HYDROmorphone HCl (DILAUDID) injection 0.2 mg  Dose: 0.2 mg  Freq: Every 6 hours PRN Route: IV  PRN Reason: severe pain  Start: 01/25/24 2147 End: 01/26/24 0657   Admin Instructions:             0242     0657-D/C'd         ondansetron (ZOFRAN) injection 4 mg  Dose: 4 mg  Freq: Every 6 hours PRN Route: IV  PRN Reasons: nausea,vomiting  Start: 01/25/24 2113   Admin Instructions:             0828           oxyCODONE-acetaminophen (PERCOCET) 5-325 mg per tablet 1 tablet  Dose: 1 tablet  Freq: Every 4 hours PRN Route: PO  PRN Reason:  moderate pain  Start: 01/28/24 0554   Admin Instructions:               0809     1238     1649     2210      0230     0821            Discharge Plan: tbd    Network Utilization Review Department  ATTENTION: Please call with any questions or concerns to [phone] and carefully listen to the prompts so that you are directed to the right person. All voicemails are confidential.   For Discharge needs, contact Care Management DC Support Team at [phone] opt. 2  Send all requests for admission clinical reviews, approved or denied determinations and any other requests to dedicated fax number below belonging to the campus where the patient is receiving treatment. List of dedicated fax numbers for the Facilities:  FACILITY NAME UR FAX NUMBER   ADMISSION DENIALS (Administrative/Medical Necessity) [phone]   DISCHARGE SUPPORT TEAM (NETWORK) [phone]   PARENT CHILD HEALTH (Maternity/NICU/Pediatrics) [phone]   [facility] [phone]   [facility] [phone]   [facility] [phone]   [facility] [phone]   [facility] [phone]   [facility] [phone]   [facility] [phone]   [facility] [phone]   [facility] [phone]   [facility] [phone]   [facility] [phone]   [facility] [phone]

## 2024-01-28 NOTE — PROGRESS NOTES
Daily Progress Note - [facility]  Family Medicine Residency  Joaquinhugo Frankel 43 y.o. male MRN: [id number]  Unit/Bed#: [address] [place] [address] Encounter: [id number]  Admitting Physician: Lillie Borges DO  PCP: Luc Casas MD  Date of Admission:  1/25/2024  6:53 PM    Summary:     IVFs:    Diet: Diet Markie/CHO Controlled; Consistent Carbohydrate Diet Level 2 (5 carb servings/75 grams CHO/meal)  VTE:  Heparin . Mechanical prophylaxis in place.   Patient Centered Rounds: I have performed bedside rounds with nursing staff today.  Specialists/Other Care Team Provider:   none    LOS: 0 day(s)  Status: Inpatient   Disposition: The patient will continue to require additional inpatient hospital stay due to IV abx  Code Status: Level 1 - Full Code      Assessment and Plan    * Sepsis (HCC)  Assessment & Plan  Present on admission as evidenced by fever, tachycardia, and tachypnea associated with leukocytosis. Likely secondary to cellulitis of left lower extremity. LA wnl. Procal and CRP elevated.     ER Course: Vancomycin 2g IV, Toradol 15 mg IV, Zofran 4 mg IV and 500 mL NS bolus    1/26: Leukocytosis resolved. Cultures pending. No additional fevers or discharge from wound. Pain still poorly controlled, will optimize and evaluate for other sources of pain. ProCal 4.81--> 2.66  1/27 : Blood cultures negative; patient still complains of pain in LLE  UA shows neg leuc and nitrites, trace ketones, blood. Ucx negative    BC negative    Plan  Continue Ancef 2 gm IV   Monitor hemodynamics  IVF discontinued    Lymphedema of both lower extremities  Assessment & Plan  Chronic  Pt has B/L Lymphedema in lower extremities, Left worse than right    Plan  Continue to elevate legs  Wear compression stockings  Continue outpatient Lymphedema massages  Follow up with wound care outpatient  Reports he will not go secondary to work disruptions  Pt needs better glycemic control  Continue Zinc supplementation    History of hidradenitis  suppurativa  Assessment & Plan  Chronic, stable.   Patient follows with wound care for hidradenitis suppurativa. He reports he did not take Humira as he could not afford it. On examination, patient has induration, scarring and wounds present present in groin and abdominal pannus.  No discharge from wounds. Reports he will not see wound care outpatient as it disrupts his work schedule.     Plan  Follow up with wound care    Cellulitis of left lower extremity  Assessment & Plan  Acute    Presented with erythema, edema, warmth, and tenderness of left lower extremity, after trauma. Patient reports he dropped a couch on his left ankle 3 days ago while moving furniture.    Labs on admission showed leukocytosis, fever, and tachycardia. Received Vancomycin 2g IV in the ED and Toradol 15 mg IV  Of note patient has had multiple admissions in the past for cellulitis and also has history of chronic lymphedema.     1/26: Patient reports pain is still poorly controlled. Received Toradol, Tylenol, and Dilaudid 0.5 mg x 1. Will optimize pain regimen with regards to poor renal function.  1/27 : Blood cultures negative    Plan  Continue Empiric Ancef 2 gm IV q8h  X-ray left ankle and Doppler LLE negative  Elevation of the affected limb  Monitor fever curve and hemodynamics  Continue wound care   Continue pain control    CLOVIS (acute kidney injury) (HCC)  Assessment & Plan  Acute    Cr 1.59 on admission (Baseline 1-1.2). Likey prerenal CLOVIS in the setting of vomiting and diarrhea about 2 days prior to admission  No prior history of CKD.    1/26: Cr 1.66, GFR 50. Of note, did receive IV toradol on admission. Will avoid NSAID's and other nephrotoxins.  1/27: Cr: 1.25    Plan  Monitor BMP daily  Hold nephrotoxic medications including lisinopril and metformin  Strict input and output monitoring  Avoid hypotension      Hyponatremia  Assessment & Plan  Improving     Na on admission 127. Likely hypovolemic hyponatremia from dehydration due to  diarrhea and vomiting.  Pt had diarrhea and vomiting initially when he sustained injury to left leg, no further episodes since admission    1/26: Na 130, continue IV fluids    Urine osm 535, Urine sodium 22. Suspect secondary to dehydration     Plan  Trend BMP daily    Type 2 diabetes mellitus with diabetic polyneuropathy, without long-term current use of insulin (HCC)  Assessment & Plan  Lab Results   Component Value Date    HGBA1C 12.1 (H) 01/26/2024       Recent Labs     01/27/24  1108 01/27/24  1601 01/27/24 2054 01/28/24  0728   POCGLU 183* 260* 260* 209*       Chronic, uncontrolled. Home regimen includes Metformin 1000 mg BID. Not taking Victoza as he cannot afford the medication. Historically tried glimepiride, wishes to try again.     Plan  A1c 12.1; patient needs better glycemic control  Resume home metformin  Start glimepiride 2 mg daily  DC  meal time insulin  Continue sliding scale  Hypoglycemia protocol  Carbohydrate controlled diet  Monitor blood sugars closely     Essential hypertension  Assessment & Plan  Chronic, stable. SBP on admission 110-120.     Plan  Hold Lisinopril due to CLOVIS  Continue Amlodipine and Coreg with appropriate hold parameters  Monitor BP and vitals daily    Dyslipidemia  Assessment & Plan  Chronic, stable    Plan  Continue home Atorvastatin 80 mg daily    Coronary artery disease  Assessment & Plan  Patient with a history of cardiac catheterization in 2010 and 2020. Stents placed in [facility] in 2020, 2 total. Follows with cardiology outpatient. Denies any chest pain and shortness of breath on admission.  Home meds include : Aspirin 81 mg daily and Carvedilol 6.25 mg BID    Plan  Continue home meds Aspirin and Coreg with hold parameters          Subjective:   Patient evaluated at bedside, reports pain in leg is still high but relieved by Dilaudid. Stating that he is frustrated at being in the hospital . Denies any other acute complaints.     Objective     Objective:   Vitals:   Temp  (24hrs), Av.3 °F (36.8 °C), Min:97.8 °F (36.6 °C), Max:99 °F (37.2 °C)    Temp:  [97.8 °F (36.6 °C)-99 °F (37.2 °C)] 99 °F (37.2 °C)  HR:  [69-77] 75  Resp:  [18] 18  BP: (111-126)/(60-74) 119/63  SpO2:  [88 %-90 %] 88 %  Body mass index is 60.33 kg/m².     Input and Output Summary (last 24 hours):       Intake/Output Summary (Last 24 hours) at 2024 1359  Last data filed at 2024 0809  Gross per 24 hour   Intake 790 ml   Output --   Net 790 ml       Physical Exam:   Physical Exam  Constitutional:       General: He is not in acute distress.  Eyes:      Pupils: Pupils are equal, round, and reactive to light.   Cardiovascular:      Rate and Rhythm: Normal rate and regular rhythm.      Pulses: Normal pulses.      Heart sounds: No murmur heard.     No friction rub. No gallop.   Pulmonary:      Effort: Pulmonary effort is normal. No respiratory distress.      Breath sounds: Normal breath sounds. No wheezing.   Abdominal:      General: Bowel sounds are normal. There is no distension.      Palpations: Abdomen is soft. There is no mass.      Tenderness: There is no abdominal tenderness.   Musculoskeletal:      Right lower leg: Edema present.      Left lower leg: Swelling and tenderness present. Edema present.      Comments: Erythema of entire left leg up to intertriginous area with indistinct margins,   Erythema has improved compared yesterdaywarm to touch, tenderness on palpation  Abrasion near left ankle with no purulent discharge     Neurological:      General: No focal deficit present.      Mental Status: He is alert.         Additional Data:     Labs:  Results from last 7 days   Lab Units 24  0552   WBC Thousand/uL 6.97   HEMOGLOBIN g/dL 10.4*   HEMATOCRIT % 31.9*   PLATELETS Thousands/uL 249   NEUTROS PCT % 73   LYMPHS PCT % 15   MONOS PCT % 9   EOS PCT % 1     Results from last 7 days   Lab Units 24  0552   POTASSIUM mmol/L 4.2   CHLORIDE mmol/L 99   CO2 mmol/L 25   BUN mg/dL 26*   CREATININE  mg/dL 1.25   CALCIUM mg/dL 7.6*   ALK PHOS U/L 174*   ALT U/L 15   AST U/L 23         Results from last 7 days   Lab Units 01/28/24  1117 01/28/24  0728 01/27/24  2054 01/27/24  1601 01/27/24  1108   POC GLUCOSE mg/dl 197* 209* 260* 260* 183*     Results from last 7 days   Lab Units 01/26/24  0520   HEMOGLOBIN A1C % 12.1*       * I Have Reviewed All Lab Data Listed Above.  * Additional Pertinent Lab Tests Reviewed: All Labs Within Last 24 Hours Reviewed    Imaging:    Imaging Reports Reviewed Today Include: none  Imaging Personally Reviewed by Myself Includes:  none    Recent Cultures (last 7 days):     Results from last 7 days   Lab Units 01/26/24  0841 01/25/24  1934   BLOOD CULTURE   --  No Growth at 48 hrs.  No Growth at 48 hrs.   URINE CULTURE  No Growth <1000 cfu/mL  --        Last 24 Hours Medication List:   Current Facility-Administered Medications   Medication Dose Route Frequency Provider Last Rate    acetaminophen  650 mg Oral Q8H PRN Dennis Waterman MD      amLODIPine  10 mg Oral Daily Wilman Pino MD      aspirin  81 mg Oral Daily Reji Whipple MD      atorvastatin  80 mg Oral Daily Reji Whipple MD      calcium carbonate  1,000 mg Oral Daily PRN Reji Whipple MD      carvedilol  6.25 mg Oral BID With Meals Reji Whipple MD      cefazolin  2,000 mg Intravenous Q8H Reji Whipple MD 2,000 mg (01/28/24 0535)    glimepiride  2 mg Oral Daily With Breakfast Lillie Borges DO      heparin (porcine)  7,500 Units Subcutaneous Q8H [facility] Reji Whipple MD      HYDROmorphone  0.5 mg Intravenous Q4H PRN Lonnie Sousa MD      insulin glargine  20 Units Subcutaneous HS Lonnie Sousa MD      insulin lispro  1-5 Units Subcutaneous HS Lonnie Sousa MD      insulin lispro  2-12 Units Subcutaneous TID With Meals Lonnie Sousa MD      metFORMIN  1,000 mg Oral BID With Meals Lillie Borges DO      ondansetron  4 mg Intravenous Q6H PRN Reji Whipple MD       oxyCODONE-acetaminophen  1 tablet Oral Q4H PRN Htet Jovan Sousa MD      zinc sulfate  220 mg Oral Daily Reji Whipple MD                 Patient Information Sharing: With the consent of Joaquin Frankel , their loved ones were notified today by inpatient team of the patient’s condition and current plan.  All questions answered.     Time Spent for Care: 45 minutes.  More than 50% of total time spent on counseling and coordination of care as described above.    ** Please Note: Dictation voice to text software may have been used in the creation of this document. **    Lillie Borges DO  01/28/24  1:59 PM

## 2024-01-29 PROBLEM — N17.9 AKI (ACUTE KIDNEY INJURY) (HCC): Status: RESOLVED | Noted: 2021-02-22 | Resolved: 2024-01-29

## 2024-01-29 LAB
ALBUMIN SERPL BCP-MCNC: 2.8 G/DL (ref 3.5–5)
ALP SERPL-CCNC: 169 U/L (ref 34–104)
ALT SERPL W P-5'-P-CCNC: 14 U/L (ref 7–52)
ANION GAP SERPL CALCULATED.3IONS-SCNC: 7 MMOL/L
AST SERPL W P-5'-P-CCNC: 23 U/L (ref 13–39)
BASOPHILS # BLD AUTO: 0.04 THOUSANDS/ÂΜL (ref 0–0.1)
BASOPHILS NFR BLD AUTO: 1 % (ref 0–1)
BILIRUB SERPL-MCNC: 0.31 MG/DL (ref 0.2–1)
BUN SERPL-MCNC: 23 MG/DL (ref 5–25)
CALCIUM ALBUM COR SERPL-MCNC: 8.9 MG/DL (ref 8.3–10.1)
CALCIUM SERPL-MCNC: 7.9 MG/DL (ref 8.4–10.2)
CHLORIDE SERPL-SCNC: 101 MMOL/L (ref 96–108)
CO2 SERPL-SCNC: 26 MMOL/L (ref 21–32)
CREAT SERPL-MCNC: 1.2 MG/DL (ref 0.6–1.3)
EOSINOPHIL # BLD AUTO: 0.12 THOUSAND/ÂΜL (ref 0–0.61)
EOSINOPHIL NFR BLD AUTO: 1 % (ref 0–6)
ERYTHROCYTE [DISTWIDTH] IN BLOOD BY AUTOMATED COUNT: 13.2 % (ref 11.6–15.1)
GFR SERPL CREATININE-BSD FRML MDRD: 74 ML/MIN/1.73SQ M
GLUCOSE SERPL-MCNC: 123 MG/DL (ref 65–140)
GLUCOSE SERPL-MCNC: 127 MG/DL (ref 65–140)
GLUCOSE SERPL-MCNC: 141 MG/DL (ref 65–140)
GLUCOSE SERPL-MCNC: 146 MG/DL (ref 65–140)
GLUCOSE SERPL-MCNC: 156 MG/DL (ref 65–140)
GLUCOSE SERPL-MCNC: 185 MG/DL (ref 65–140)
HCT VFR BLD AUTO: 31.9 % (ref 36.5–49.3)
HGB BLD-MCNC: 10.2 G/DL (ref 12–17)
IMM GRANULOCYTES # BLD AUTO: 0.19 THOUSAND/UL (ref 0–0.2)
IMM GRANULOCYTES NFR BLD AUTO: 2 % (ref 0–2)
LYMPHOCYTES # BLD AUTO: 1.4 THOUSANDS/ÂΜL (ref 0.6–4.47)
LYMPHOCYTES NFR BLD AUTO: 16 % (ref 14–44)
MAGNESIUM SERPL-MCNC: 2.1 MG/DL (ref 1.9–2.7)
MCH RBC QN AUTO: 27.3 PG (ref 26.8–34.3)
MCHC RBC AUTO-ENTMCNC: 32 G/DL (ref 31.4–37.4)
MCV RBC AUTO: 86 FL (ref 82–98)
MONOCYTES # BLD AUTO: 0.83 THOUSAND/ÂΜL (ref 0.17–1.22)
MONOCYTES NFR BLD AUTO: 9 % (ref 4–12)
NEUTROPHILS # BLD AUTO: 6.25 THOUSANDS/ÂΜL (ref 1.85–7.62)
NEUTS SEG NFR BLD AUTO: 71 % (ref 43–75)
NRBC BLD AUTO-RTO: 0 /100 WBCS
PLATELET # BLD AUTO: 299 THOUSANDS/UL (ref 149–390)
PMV BLD AUTO: 9.6 FL (ref 8.9–12.7)
POTASSIUM SERPL-SCNC: 4.3 MMOL/L (ref 3.5–5.3)
PROT SERPL-MCNC: 7 G/DL (ref 6.4–8.4)
RBC # BLD AUTO: 3.73 MILLION/UL (ref 3.88–5.62)
SODIUM SERPL-SCNC: 134 MMOL/L (ref 135–147)
WBC # BLD AUTO: 8.83 THOUSAND/UL (ref 4.31–10.16)

## 2024-01-29 PROCEDURE — 80053 COMPREHEN METABOLIC PANEL: CPT

## 2024-01-29 PROCEDURE — 85025 COMPLETE CBC W/AUTO DIFF WBC: CPT

## 2024-01-29 PROCEDURE — 82948 REAGENT STRIP/BLOOD GLUCOSE: CPT

## 2024-01-29 PROCEDURE — 83735 ASSAY OF MAGNESIUM: CPT

## 2024-01-29 PROCEDURE — 99232 SBSQ HOSP IP/OBS MODERATE 35: CPT | Performed by: INTERNAL MEDICINE

## 2024-01-29 RX ORDER — INSULIN GLARGINE 100 [IU]/ML
16 INJECTION, SOLUTION SUBCUTANEOUS
Status: DISCONTINUED | OUTPATIENT
Start: 2024-01-29 | End: 2024-01-30

## 2024-01-29 RX ORDER — FUROSEMIDE 10 MG/ML
40 INJECTION INTRAMUSCULAR; INTRAVENOUS ONCE
Status: COMPLETED | OUTPATIENT
Start: 2024-01-29 | End: 2024-01-29

## 2024-01-29 RX ORDER — POLYETHYLENE GLYCOL 3350 17 G/17G
17 POWDER, FOR SOLUTION ORAL DAILY
Status: DISCONTINUED | OUTPATIENT
Start: 2024-01-29 | End: 2024-01-30 | Stop reason: HOSPADM

## 2024-01-29 RX ADMIN — INSULIN GLARGINE 16 UNITS: 100 INJECTION, SOLUTION SUBCUTANEOUS at 22:05

## 2024-01-29 RX ADMIN — HEPARIN SODIUM 7500 UNITS: 5000 INJECTION INTRAVENOUS; SUBCUTANEOUS at 06:08

## 2024-01-29 RX ADMIN — CEFAZOLIN SODIUM 2000 MG: 2 SOLUTION INTRAVENOUS at 17:50

## 2024-01-29 RX ADMIN — CEFAZOLIN SODIUM 2000 MG: 2 SOLUTION INTRAVENOUS at 06:08

## 2024-01-29 RX ADMIN — CARVEDILOL 6.25 MG: 6.25 TABLET, FILM COATED ORAL at 08:21

## 2024-01-29 RX ADMIN — ASPIRIN 81 MG CHEWABLE TABLET 81 MG: 81 TABLET CHEWABLE at 08:22

## 2024-01-29 RX ADMIN — GLIMEPIRIDE 2 MG: 2 TABLET ORAL at 08:21

## 2024-01-29 RX ADMIN — INSULIN LISPRO 1 UNITS: 100 INJECTION, SOLUTION INTRAVENOUS; SUBCUTANEOUS at 22:04

## 2024-01-29 RX ADMIN — CARVEDILOL 6.25 MG: 6.25 TABLET, FILM COATED ORAL at 17:39

## 2024-01-29 RX ADMIN — POLYETHYLENE GLYCOL 3350 17 G: 17 POWDER, FOR SOLUTION ORAL at 08:31

## 2024-01-29 RX ADMIN — OXYCODONE HYDROCHLORIDE AND ACETAMINOPHEN 1 TABLET: 5; 325 TABLET ORAL at 20:06

## 2024-01-29 RX ADMIN — AMLODIPINE BESYLATE 10 MG: 10 TABLET ORAL at 08:22

## 2024-01-29 RX ADMIN — OXYCODONE HYDROCHLORIDE AND ACETAMINOPHEN 1 TABLET: 5; 325 TABLET ORAL at 15:32

## 2024-01-29 RX ADMIN — HEPARIN SODIUM 7500 UNITS: 5000 INJECTION INTRAVENOUS; SUBCUTANEOUS at 22:04

## 2024-01-29 RX ADMIN — HYDROMORPHONE HYDROCHLORIDE 0.5 MG: 1 INJECTION, SOLUTION INTRAMUSCULAR; INTRAVENOUS; SUBCUTANEOUS at 22:04

## 2024-01-29 RX ADMIN — OXYCODONE HYDROCHLORIDE AND ACETAMINOPHEN 1 TABLET: 5; 325 TABLET ORAL at 02:30

## 2024-01-29 RX ADMIN — HYDROMORPHONE HYDROCHLORIDE 0.5 MG: 1 INJECTION, SOLUTION INTRAMUSCULAR; INTRAVENOUS; SUBCUTANEOUS at 10:45

## 2024-01-29 RX ADMIN — METFORMIN HYDROCHLORIDE 1000 MG: 500 TABLET ORAL at 17:39

## 2024-01-29 RX ADMIN — HYDROMORPHONE HYDROCHLORIDE 0.5 MG: 1 INJECTION, SOLUTION INTRAMUSCULAR; INTRAVENOUS; SUBCUTANEOUS at 03:36

## 2024-01-29 RX ADMIN — METFORMIN HYDROCHLORIDE 1000 MG: 500 TABLET ORAL at 08:21

## 2024-01-29 RX ADMIN — ATORVASTATIN CALCIUM 80 MG: 80 TABLET, FILM COATED ORAL at 08:21

## 2024-01-29 RX ADMIN — ZINC SULFATE 220 MG (50 MG) CAPSULE 220 MG: CAPSULE at 08:22

## 2024-01-29 RX ADMIN — OXYCODONE HYDROCHLORIDE AND ACETAMINOPHEN 1 TABLET: 5; 325 TABLET ORAL at 08:21

## 2024-01-29 RX ADMIN — INSULIN LISPRO 2 UNITS: 100 INJECTION, SOLUTION INTRAVENOUS; SUBCUTANEOUS at 12:19

## 2024-01-29 RX ADMIN — HYDROMORPHONE HYDROCHLORIDE 0.5 MG: 1 INJECTION, SOLUTION INTRAMUSCULAR; INTRAVENOUS; SUBCUTANEOUS at 17:49

## 2024-01-29 RX ADMIN — HEPARIN SODIUM 7500 UNITS: 5000 INJECTION INTRAVENOUS; SUBCUTANEOUS at 15:21

## 2024-01-29 RX ADMIN — FUROSEMIDE 40 MG: 10 INJECTION, SOLUTION INTRAMUSCULAR; INTRAVENOUS at 12:22

## 2024-01-29 NOTE — PLAN OF CARE
Problem: PAIN - ADULT  Goal: Verbalizes/displays adequate comfort level or baseline comfort level  Description: Interventions:  - Encourage patient to monitor pain and request assistance  - Assess pain using appropriate pain scale  - Administer analgesics based on type and severity of pain and evaluate response  - Implement non-pharmacological measures as appropriate and evaluate response  - Consider cultural and social influences on pain and pain management  - Notify physician/advanced practitioner if interventions unsuccessful or patient reports new pain  Outcome: Progressing     Problem: INFECTION - ADULT  Goal: Absence or prevention of progression during hospitalization  Description: INTERVENTIONS:  - Assess and monitor for signs and symptoms of infection  - Monitor lab/diagnostic results  - Monitor all insertion sites, i.e. indwelling lines, tubes, and drains  - Monitor endotracheal if appropriate and nasal secretions for changes in amount and color  - [place] appropriate cooling/warming therapies per order  - Administer medications as ordered  - Instruct and encourage patient and family to use good hand hygiene technique  - Identify and instruct in appropriate isolation precautions for identified infection/condition  Outcome: Progressing  Goal: Absence of fever/infection during neutropenic period  Description: INTERVENTIONS:  - Monitor WBC    Outcome: Progressing     Problem: SAFETY ADULT  Goal: Patient will remain free of falls  Description: INTERVENTIONS:  - Educate patient/family on patient safety including physical limitations  - Instruct patient to call for assistance with activity   - Consult OT/PT to assist with strengthening/mobility   - Keep Call bell within reach  - Keep bed low and locked with side rails adjusted as appropriate  - Keep care items and personal belongings within reach  - Initiate and maintain comfort rounds  - Make Fall Risk Sign visible to staff  - Offer Toileting every 2 Hours,  in advance of need  - Initiate/Maintain bedalarm  - Obtain necessary fall risk management equipment: socks    Problem: DISCHARGE PLANNING  Goal: Discharge to home or other facility with appropriate resources  Description: INTERVENTIONS:  - Identify barriers to discharge w/patient and caregiver  - Arrange for needed discharge resources and transportation as appropriate  - Identify discharge learning needs (meds, wound care, etc.)  - Arrange for interpretive services to assist at discharge as needed  - Refer to Case Management Department for coordinating discharge planning if the patient needs post-hospital services based on physician/advanced practitioner order or complex needs related to functional status, cognitive ability, or social support system  Outcome: Progressing     Problem: Knowledge Deficit  Goal: Patient/family/caregiver demonstrates understanding of disease process, treatment plan, medications, and discharge instructions  Description: Complete learning assessment and assess knowledge base.  Interventions:  - Provide teaching at level of understanding  - Provide teaching via preferred learning methods  Outcome: Progressing     - Apply yellow socks and bracelet for high fall risk patients  - Consider moving patient to room near nurses station  Outcome: Progressing

## 2024-01-29 NOTE — PROGRESS NOTES
Daily Progress Note - [facility]  Family Medicine Residency  Joaquinhugo Frankel 43 y.o. male MRN: [id number]  Unit/Bed#: [address] [place] [address] Encounter: [id number]  Admitting Physician: Lillie Borges DO  PCP: Luc Casas MD  Date of Admission:  1/25/2024  6:53 PM    Summary:     IVFs:    Diet: Diet Markie/CHO Controlled; Consistent Carbohydrate Diet Level 2 (5 carb servings/75 grams CHO/meal)  VTE:  Enoxaparin (Lovenox) . Mechanical prophylaxis in place.   Patient Centered Rounds: I have performed bedside rounds with nursing staff today.  Specialists/Other Care Team Provider:     LOS: 1 day(s)  Status: Inpatient   Disposition: The patient will continue to require additional inpatient hospital stay due to IV abx  Code Status: Level 1 - Full Code      Assessment and Plan    * Sepsis (HCC)  Assessment & Plan  Present on admission as evidenced by fever, tachycardia, and tachypnea associated with leukocytosis. Likely secondary to cellulitis of left lower extremity. LA wnl. Procal and CRP elevated.     ER Course: Vancomycin 2g IV, Toradol 15 mg IV, Zofran 4 mg IV and 500 mL NS bolus    1/26: Leukocytosis resolved. Cultures pending. No additional fevers or discharge from wound. Pain still poorly controlled, will optimize and evaluate for other sources of pain. ProCal 4.81--> 2.66  1/27 : Blood cultures negative; patient still complains of pain in LLE  UA shows neg leuc and nitrites, trace ketones, blood. Ucx negative    BC negative    Plan  Continue Ancef 2 gm IV   Monitor hemodynamics  IVF discontinued    Lymphedema of both lower extremities  Assessment & Plan  Chronic  Pt has B/L Lymphedema in lower extremities, Left worse than right    Plan  Continue to elevate legs  Wear compression stockings  Continue outpatient Lymphedema massages  Follow up with wound care outpatient  Reports he will not go secondary to work disruptions  Pt needs better glycemic control  Continue Zinc supplementation  Lasix 40 mg IV x  1    History of hidradenitis suppurativa  Assessment & Plan  Chronic, stable.   Patient follows with wound care for hidradenitis suppurativa. He reports he did not take Humira as he could not afford it. On examination, patient has induration, scarring and wounds present present in groin and abdominal pannus.  No discharge from wounds. Reports he will not see wound care outpatient as it disrupts his work schedule.     Plan  Follow up with wound care    Cellulitis of left lower extremity  Assessment & Plan  Acute    Presented with erythema, edema, warmth, and tenderness of left lower extremity, after trauma. Patient reports he dropped a couch on his left ankle 3 days ago while moving furniture.    Labs on admission showed leukocytosis, fever, and tachycardia. Received Vancomycin 2g IV in the ED and Toradol 15 mg IV  Of note patient has had multiple admissions in the past for cellulitis and also has history of chronic lymphedema.     1/26: Patient reports pain is still poorly controlled. Received Toradol, Tylenol, and Dilaudid 0.5 mg x 1. Will optimize pain regimen with regards to poor renal function.  1/27 : Blood cultures negative    Plan  Continue Empiric Ancef 2 gm IV q8h  X-ray left ankle and Doppler LLE negative  Elevation of the affected limb  Monitor fever curve and hemodynamics  Continue wound care   Continue pain control    Hyponatremia  Assessment & Plan  Improving     Na on admission 127. Likely hypovolemic hyponatremia from dehydration due to diarrhea and vomiting.  Pt had diarrhea and vomiting initially when he sustained injury to left leg, no further episodes since admission    1/26: Na 130, continue IV fluids    Urine osm 535, Urine sodium 22. Suspect secondary to dehydration     Plan  Trend BMP daily    Type 2 diabetes mellitus with diabetic polyneuropathy, without long-term current use of insulin ([facility])  Assessment & Plan  Lab Results   Component Value Date    HGBA1C 12.1 (H) 01/26/2024       Recent  Labs     24  2111 24  0223 24  0724 24  1140   POCGLU 143* 146* 123 156*       Chronic, uncontrolled. Home regimen includes Metformin 1000 mg BID. Not taking Victoza as he cannot afford the medication. Historically tried glimepiride, wishes to try again.     Plan  A1c 12.1; patient needs better glycemic control  Resume home metformin  Start glimepiride 2 mg daily  DC  meal time insulin  Continue sliding scale  Hypoglycemia protocol  Carbohydrate controlled diet  Monitor blood sugars closely     Essential hypertension  Assessment & Plan  Chronic, stable. SBP on admission 110-120.     Plan  Hold Lisinopril due to CLOVIS  Continue Amlodipine and Coreg with appropriate hold parameters  Monitor BP and vitals daily    Dyslipidemia  Assessment & Plan  Chronic, stable    Plan  Continue home Atorvastatin 80 mg daily    Coronary artery disease  Assessment & Plan  Patient with a history of cardiac catheterization in  and . Stents placed in [facility] in , 2 total. Follows with cardiology outpatient. Denies any chest pain and shortness of breath on admission.  Home meds include : Aspirin 81 mg daily and Carvedilol 6.25 mg BID    Plan  Continue home meds Aspirin and Coreg with hold parameters    CLOVIS (acute kidney injury) (HCC)-resolved as of 2024  Assessment & Plan  Acute    Cr 1.59 on admission (Baseline 1-1.2). Likey prerenal CLOVIS in the setting of vomiting and diarrhea about 2 days prior to admission  No prior history of CKD.    : Cr 1.66, GFR 50. Of note, did receive IV toradol on admission. Will avoid NSAID's and other nephrotoxins.  : Cr: 1.25    Plan  Monitor BMP daily  Hold nephrotoxic medications including lisinopril and metformin  Strict input and output monitoring  Avoid hypotension            Subjective:   Patient evaluated at bedside, reporting improvement of pain and swelling. No acute complaints.    Objective     Objective:   Vitals:   Temp (24hrs), Av.1 °F (36.7 °C),  Min:97.4 °F (36.3 °C), Max:98.4 °F (36.9 °C)    Temp:  [97.4 °F (36.3 °C)-98.4 °F (36.9 °C)] 98.4 °F (36.9 °C)  HR:  [67-78] 72  Resp:  [18] 18  BP: (111-156)/(56-89) 111/56  SpO2:  [88 %-97 %] 92 %  Body mass index is 60.33 kg/m².     Input and Output Summary (last 24 hours):       Intake/Output Summary (Last 24 hours) at 1/29/2024 1351  Last data filed at 1/28/2024 2350  Gross per 24 hour   Intake 50 ml   Output --   Net 50 ml       Physical Exam:   Physical Exam  Constitutional:       General: He is not in acute distress.  Eyes:      Pupils: Pupils are equal, round, and reactive to light.   Cardiovascular:      Rate and Rhythm: Normal rate and regular rhythm.      Pulses: Normal pulses.      Heart sounds: No murmur heard.     No friction rub. No gallop.   Pulmonary:      Effort: Pulmonary effort is normal. No respiratory distress.      Breath sounds: Normal breath sounds. No wheezing.   Abdominal:      General: Bowel sounds are normal. There is no distension.      Palpations: Abdomen is soft. There is no mass.      Tenderness: There is no abdominal tenderness.   Musculoskeletal:      Right lower leg: Edema present.      Left lower leg: Swelling and tenderness present. Edema present.      Comments: Erythema of entire left leg up to intertriginous area with indistinct margins,   Erythema has improved compared yesterdaywarm to touch, tenderness on palpation  Abrasion near left ankle with no purulent discharge     Neurological:      General: No focal deficit present.      Mental Status: He is alert.       Additional Data:     Labs:  Results from last 7 days   Lab Units 01/29/24  0614   WBC Thousand/uL 8.83   HEMOGLOBIN g/dL 10.2*   HEMATOCRIT % 31.9*   PLATELETS Thousands/uL 299   NEUTROS PCT % 71   LYMPHS PCT % 16   MONOS PCT % 9   EOS PCT % 1     Results from last 7 days   Lab Units 01/29/24  0614   POTASSIUM mmol/L 4.3   CHLORIDE mmol/L 101   CO2 mmol/L 26   BUN mg/dL 23   CREATININE mg/dL 1.20   CALCIUM mg/dL  7.9*   ALK PHOS U/L 169*   ALT U/L 14   AST U/L 23         Results from last 7 days   Lab Units 01/29/24  1140 01/29/24  0724 01/29/24  0223 01/28/24  2111 01/28/24  1612   POC GLUCOSE mg/dl 156* 123 146* 143* 133     Results from last 7 days   Lab Units 01/26/24  0520   HEMOGLOBIN A1C % 12.1*       * I Have Reviewed All Lab Data Listed Above.  * Additional Pertinent Lab Tests Reviewed: All Labs Within Last 24 Hours Reviewed    Imaging:    Imaging Reports Reviewed Today Include: none  Imaging Personally Reviewed by Myself Includes:  none    Recent Cultures (last 7 days):     Results from last 7 days   Lab Units 01/26/24  0841 01/25/24  1934   BLOOD CULTURE   --  No Growth at 72 hrs.  No Growth at 72 hrs.   URINE CULTURE  No Growth <1000 cfu/mL  --        Last 24 Hours Medication List:   Current Facility-Administered Medications   Medication Dose Route Frequency Provider Last Rate    acetaminophen  650 mg Oral Q8H PRN Dennis Waterman MD      amLODIPine  10 mg Oral Daily Wilman Pino MD      aspirin  81 mg Oral Daily Reji Whipple MD      atorvastatin  80 mg Oral Daily Reji Whipple MD      calcium carbonate  1,000 mg Oral Daily PRN Reji Whipple MD      carvedilol  6.25 mg Oral BID With Meals Reji Whipple MD      cefazolin  2,000 mg Intravenous Q8H Reji Whipple MD 2,000 mg (01/29/24 0608)    glimepiride  2 mg Oral Daily With Breakfast Lillie Borges DO      heparin (porcine)  7,500 Units Subcutaneous Q8H [facility] Reji Whipple MD      HYDROmorphone  0.5 mg Intravenous Q4H PRN Lonnie Sousa MD      insulin glargine  16 Units Subcutaneous HS Lillie Borges DO      insulin lispro  1-5 Units Subcutaneous HS Lonnie Sousa MD      insulin lispro  2-12 Units Subcutaneous TID With Meals Lonnie Sousa MD      metFORMIN  1,000 mg Oral BID With Meals Lillie Borges DO      ondansetron  4 mg Intravenous Q6H PRN Reji Whipple MD      oxyCODONE-acetaminophen  1 tablet Oral Q4H  SEBAS Sousa MD      polyethylene glycol  17 g Oral Daily Lillie Borges DO      zinc sulfate  220 mg Oral Daily Reji Whipple MD                 Patient Information Sharing: With the consent of Joauqin Frankel , their loved ones were notified today by inpatient team of the patient’s condition and current plan.  All questions answered.    Time Spent for Care: 45 minutes.  More than 50% of total time spent on counseling and coordination of care as described above.    ** Please Note: Dictation voice to text software may have been used in the creation of this document. **    Lillie Borges DO  01/29/24  1:51 PM

## 2024-01-30 ENCOUNTER — TELEPHONE (OUTPATIENT)
Dept: FAMILY MEDICINE CLINIC | Facility: CLINIC | Age: 44
End: 2024-01-30

## 2024-01-30 VITALS
WEIGHT: 315 LBS | DIASTOLIC BLOOD PRESSURE: 56 MMHG | OXYGEN SATURATION: 94 % | SYSTOLIC BLOOD PRESSURE: 123 MMHG | TEMPERATURE: 97.4 F | BODY MASS INDEX: 49.44 KG/M2 | RESPIRATION RATE: 18 BRPM | HEIGHT: 67 IN | HEART RATE: 70 BPM

## 2024-01-30 PROBLEM — G47.30 SLEEP APNEA: Status: ACTIVE | Noted: 2024-01-30

## 2024-01-30 LAB
ALBUMIN SERPL BCP-MCNC: 2.8 G/DL (ref 3.5–5)
ALP SERPL-CCNC: 161 U/L (ref 34–104)
ALT SERPL W P-5'-P-CCNC: 11 U/L (ref 7–52)
ANION GAP SERPL CALCULATED.3IONS-SCNC: 7 MMOL/L
ANISOCYTOSIS BLD QL SMEAR: PRESENT
AST SERPL W P-5'-P-CCNC: 20 U/L (ref 13–39)
BASOPHILS # BLD MANUAL: 0 THOUSAND/UL (ref 0–0.1)
BASOPHILS NFR MAR MANUAL: 0 % (ref 0–1)
BILIRUB SERPL-MCNC: 0.28 MG/DL (ref 0.2–1)
BUN SERPL-MCNC: 22 MG/DL (ref 5–25)
CALCIUM ALBUM COR SERPL-MCNC: 9.3 MG/DL (ref 8.3–10.1)
CALCIUM SERPL-MCNC: 8.3 MG/DL (ref 8.4–10.2)
CHLORIDE SERPL-SCNC: 99 MMOL/L (ref 96–108)
CO2 SERPL-SCNC: 27 MMOL/L (ref 21–32)
CREAT SERPL-MCNC: 1.16 MG/DL (ref 0.6–1.3)
EOSINOPHIL # BLD MANUAL: 0.28 THOUSAND/UL (ref 0–0.4)
EOSINOPHIL NFR BLD MANUAL: 3 % (ref 0–6)
ERYTHROCYTE [DISTWIDTH] IN BLOOD BY AUTOMATED COUNT: 13.3 % (ref 11.6–15.1)
GFR SERPL CREATININE-BSD FRML MDRD: 77 ML/MIN/1.73SQ M
GLUCOSE SERPL-MCNC: 141 MG/DL (ref 65–140)
GLUCOSE SERPL-MCNC: 147 MG/DL (ref 65–140)
GLUCOSE SERPL-MCNC: 91 MG/DL (ref 65–140)
GLUCOSE SERPL-MCNC: 99 MG/DL (ref 65–140)
HCT VFR BLD AUTO: 32.7 % (ref 36.5–49.3)
HGB BLD-MCNC: 10.4 G/DL (ref 12–17)
LYMPHOCYTES # BLD AUTO: 1.7 THOUSAND/UL (ref 0.6–4.47)
LYMPHOCYTES # BLD AUTO: 16 % (ref 14–44)
MACROCYTES BLD QL AUTO: PRESENT
MAGNESIUM SERPL-MCNC: 1.7 MG/DL (ref 1.9–2.7)
MCH RBC QN AUTO: 27.4 PG (ref 26.8–34.3)
MCHC RBC AUTO-ENTMCNC: 31.8 G/DL (ref 31.4–37.4)
MCV RBC AUTO: 86 FL (ref 82–98)
METAMYELOCYTES NFR BLD MANUAL: 3 % (ref 0–1)
MONOCYTES # BLD AUTO: 0.57 THOUSAND/UL (ref 0–1.22)
MONOCYTES NFR BLD: 6 % (ref 4–12)
MYELOCYTES NFR BLD MANUAL: 1 % (ref 0–1)
NEUTROPHILS # BLD MANUAL: 6.5 THOUSAND/UL (ref 1.85–7.62)
NEUTS BAND NFR BLD MANUAL: 1 % (ref 0–8)
NEUTS SEG NFR BLD AUTO: 68 % (ref 43–75)
PLATELET # BLD AUTO: 348 THOUSANDS/UL (ref 149–390)
PLATELET BLD QL SMEAR: ADEQUATE
PMV BLD AUTO: 9.3 FL (ref 8.9–12.7)
POLYCHROMASIA BLD QL SMEAR: PRESENT
POTASSIUM SERPL-SCNC: 4.3 MMOL/L (ref 3.5–5.3)
PROT SERPL-MCNC: 7.2 G/DL (ref 6.4–8.4)
RBC # BLD AUTO: 3.8 MILLION/UL (ref 3.88–5.62)
RBC MORPH BLD: PRESENT
SODIUM SERPL-SCNC: 133 MMOL/L (ref 135–147)
VARIANT LYMPHS # BLD AUTO: 2 %
WBC # BLD AUTO: 9.42 THOUSAND/UL (ref 4.31–10.16)

## 2024-01-30 PROCEDURE — 85007 BL SMEAR W/DIFF WBC COUNT: CPT

## 2024-01-30 PROCEDURE — 82948 REAGENT STRIP/BLOOD GLUCOSE: CPT

## 2024-01-30 PROCEDURE — 85027 COMPLETE CBC AUTOMATED: CPT

## 2024-01-30 PROCEDURE — 83735 ASSAY OF MAGNESIUM: CPT

## 2024-01-30 PROCEDURE — 99239 HOSP IP/OBS DSCHRG MGMT >30: CPT | Performed by: STUDENT IN AN ORGANIZED HEALTH CARE EDUCATION/TRAINING PROGRAM

## 2024-01-30 PROCEDURE — 80053 COMPREHEN METABOLIC PANEL: CPT

## 2024-01-30 RX ORDER — INSULIN GLARGINE 100 [IU]/ML
12 INJECTION, SOLUTION SUBCUTANEOUS
Qty: 10 ML | Refills: 0 | Status: SHIPPED | OUTPATIENT
Start: 2024-01-30

## 2024-01-30 RX ORDER — GLIMEPIRIDE 2 MG/1
2 TABLET ORAL
Qty: 30 TABLET | Refills: 0 | Status: SHIPPED | OUTPATIENT
Start: 2024-01-31 | End: 2024-03-01

## 2024-01-30 RX ORDER — INSULIN GLARGINE 100 [IU]/ML
12 INJECTION, SOLUTION SUBCUTANEOUS
Status: DISCONTINUED | OUTPATIENT
Start: 2024-01-30 | End: 2024-01-30 | Stop reason: HOSPADM

## 2024-01-30 RX ORDER — OXYCODONE HYDROCHLORIDE 10 MG/1
10 TABLET ORAL EVERY 8 HOURS
Status: DISCONTINUED | OUTPATIENT
Start: 2024-01-30 | End: 2024-01-30 | Stop reason: HOSPADM

## 2024-01-30 RX ORDER — OXYCODONE HYDROCHLORIDE 5 MG/1
5 TABLET ORAL EVERY 6 HOURS PRN
Qty: 16 TABLET | Refills: 0 | Status: SHIPPED | OUTPATIENT
Start: 2024-01-30 | End: 2024-02-09

## 2024-01-30 RX ORDER — LOSARTAN POTASSIUM 25 MG/1
25 TABLET ORAL DAILY
Qty: 30 TABLET | Refills: 0 | Status: SHIPPED | OUTPATIENT
Start: 2024-01-30 | End: 2024-02-29

## 2024-01-30 RX ORDER — ACETAMINOPHEN 325 MG/1
975 TABLET ORAL EVERY 8 HOURS SCHEDULED
Status: DISCONTINUED | OUTPATIENT
Start: 2024-01-30 | End: 2024-01-30 | Stop reason: HOSPADM

## 2024-01-30 RX ORDER — ACETAMINOPHEN 325 MG/1
975 TABLET ORAL EVERY 8 HOURS SCHEDULED
Qty: 45 TABLET | Refills: 0 | Status: SHIPPED | OUTPATIENT
Start: 2024-01-30 | End: 2024-02-04

## 2024-01-30 RX ORDER — CEPHALEXIN 500 MG/1
500 CAPSULE ORAL EVERY 12 HOURS SCHEDULED
Qty: 10 CAPSULE | Refills: 0 | Status: SHIPPED | OUTPATIENT
Start: 2024-01-30 | End: 2024-02-04

## 2024-01-30 RX ORDER — MAGNESIUM SULFATE HEPTAHYDRATE 40 MG/ML
2 INJECTION, SOLUTION INTRAVENOUS ONCE
Status: COMPLETED | OUTPATIENT
Start: 2024-01-30 | End: 2024-01-30

## 2024-01-30 RX ADMIN — HEPARIN SODIUM 7500 UNITS: 5000 INJECTION INTRAVENOUS; SUBCUTANEOUS at 05:16

## 2024-01-30 RX ADMIN — CARVEDILOL 6.25 MG: 6.25 TABLET, FILM COATED ORAL at 18:05

## 2024-01-30 RX ADMIN — OXYCODONE HYDROCHLORIDE AND ACETAMINOPHEN 1 TABLET: 5; 325 TABLET ORAL at 06:22

## 2024-01-30 RX ADMIN — CEFAZOLIN SODIUM 2000 MG: 2 SOLUTION INTRAVENOUS at 01:11

## 2024-01-30 RX ADMIN — HYDROMORPHONE HYDROCHLORIDE 0.5 MG: 1 INJECTION, SOLUTION INTRAMUSCULAR; INTRAVENOUS; SUBCUTANEOUS at 08:18

## 2024-01-30 RX ADMIN — ASPIRIN 81 MG CHEWABLE TABLET 81 MG: 81 TABLET CHEWABLE at 08:18

## 2024-01-30 RX ADMIN — ACETAMINOPHEN 975 MG: 325 TABLET ORAL at 10:09

## 2024-01-30 RX ADMIN — CEFAZOLIN SODIUM 2000 MG: 2 SOLUTION INTRAVENOUS at 10:09

## 2024-01-30 RX ADMIN — AMLODIPINE BESYLATE 10 MG: 10 TABLET ORAL at 08:18

## 2024-01-30 RX ADMIN — ZINC SULFATE 220 MG (50 MG) CAPSULE 220 MG: CAPSULE at 08:18

## 2024-01-30 RX ADMIN — METFORMIN HYDROCHLORIDE 1000 MG: 500 TABLET ORAL at 18:05

## 2024-01-30 RX ADMIN — OXYCODONE HYDROCHLORIDE 10 MG: 10 TABLET ORAL at 10:09

## 2024-01-30 RX ADMIN — MAGNESIUM SULFATE HEPTAHYDRATE 2 G: 40 INJECTION, SOLUTION INTRAVENOUS at 10:09

## 2024-01-30 RX ADMIN — HYDROMORPHONE HYDROCHLORIDE 0.5 MG: 1 INJECTION, SOLUTION INTRAMUSCULAR; INTRAVENOUS; SUBCUTANEOUS at 03:58

## 2024-01-30 RX ADMIN — GLIMEPIRIDE 2 MG: 2 TABLET ORAL at 08:18

## 2024-01-30 RX ADMIN — OXYCODONE HYDROCHLORIDE 10 MG: 10 TABLET ORAL at 18:05

## 2024-01-30 RX ADMIN — ATORVASTATIN CALCIUM 80 MG: 80 TABLET, FILM COATED ORAL at 08:18

## 2024-01-30 RX ADMIN — METFORMIN HYDROCHLORIDE 1000 MG: 500 TABLET ORAL at 08:18

## 2024-01-30 RX ADMIN — CARVEDILOL 6.25 MG: 6.25 TABLET, FILM COATED ORAL at 08:18

## 2024-01-30 RX ADMIN — OXYCODONE HYDROCHLORIDE AND ACETAMINOPHEN 1 TABLET: 5; 325 TABLET ORAL at 01:13

## 2024-01-30 NOTE — PROGRESS NOTES
Daily Progress Note - [facility]  Family Medicine Residency  Joaquinhugo Frankel 43 y.o. male MRN: [id number]  Unit/Bed#: [address] [place] [address] Encounter: [id number]  Admitting Physician: Lillie Borges DO  PCP: Luc Casas MD  Date of Admission:  1/25/2024  6:53 PM    Summary:     IVFs:    Diet: Diet Markie/CHO Controlled; Consistent Carbohydrate Diet Level 2 (5 carb servings/75 grams CHO/meal)  VTE:  Heparin . Mechanical prophylaxis in place.   Patient Centered Rounds: I have performed bedside rounds with nursing staff today.      LOS: 2 day(s)  Status: Inpatient   Disposition: The patient will continue to require additional inpatient hospital stay due to IV Abx  Code Status: Level 1 - Full Code      Assessment and Plan    * Sepsis (HCC)  Assessment & Plan  Present on admission as evidenced by fever, tachycardia, and tachypnea associated with leukocytosis. Likely secondary to cellulitis of left lower extremity. LA wnl. Procal and CRP elevated.     ER Course: Vancomycin 2g IV, Toradol 15 mg IV, Zofran 4 mg IV and 500 mL NS bolus    1/26: Leukocytosis resolved. Cultures pending. No additional fevers or discharge from wound. Pain still poorly controlled, will optimize and evaluate for other sources of pain. ProCal 4.81--> 2.66  1/27 : Blood cultures negative; patient still complains of pain in LLE  UA shows neg leuc and nitrites, trace ketones, blood. Ucx negative    BC negative    Plan  Continue Ancef 2 gm IV   Monitor hemodynamics  IVF discontinued    Lymphedema of both lower extremities  Assessment & Plan  Chronic  Pt has B/L Lymphedema in lower extremities, Left worse than right    Plan  Continue to elevate legs  Wear compression stockings  Continue outpatient Lymphedema massages  Follow up with wound care outpatient  Reports he will not go secondary to work disruptions  Pt needs better glycemic control  Continue Zinc supplementation  Lasix 40 mg IV x 1    History of hidradenitis suppurativa  Assessment  & Plan  Chronic, stable.   Patient follows with wound care for hidradenitis suppurativa. He reports he did not take Humira as he could not afford it. On examination, patient has induration, scarring and wounds present present in groin and abdominal pannus.  No discharge from wounds. Reports he will not see wound care outpatient as it disrupts his work schedule.     Plan  Follow up with wound care    Cellulitis of left lower extremity  Assessment & Plan  Acute    Presented with erythema, edema, warmth, and tenderness of left lower extremity, after trauma. Patient reports he dropped a couch on his left ankle 3 days ago while moving furniture.    Labs on admission showed leukocytosis, fever, and tachycardia. Received Vancomycin 2g IV in the ED and Toradol 15 mg IV  Of note patient has had multiple admissions in the past for cellulitis and also has history of chronic lymphedema.     1/26: Patient reports pain is still poorly controlled. Received Toradol, Tylenol, and Dilaudid 0.5 mg x 1. Will optimize pain regimen with regards to poor renal function.  1/27 : Blood cultures negative    Plan  Continue Empiric Ancef 2 gm IV q8h  X-ray left ankle and Doppler LLE negative  Elevation of the affected limb  Monitor fever curve and hemodynamics  Continue wound care   Continue pain control    Hyponatremia  Assessment & Plan  Improving     Na on admission 127. Likely hypovolemic hyponatremia from dehydration due to diarrhea and vomiting.  Pt had diarrhea and vomiting initially when he sustained injury to left leg, no further episodes since admission    1/26: Na 130, continue IV fluids    Urine osm 535, Urine sodium 22. Suspect secondary to dehydration     Plan  Trend BMP daily    Type 2 diabetes mellitus with diabetic polyneuropathy, without long-term current use of insulin ([facility])  Assessment & Plan  Lab Results   Component Value Date    HGBA1C 12.1 (H) 01/26/2024       Recent Labs     01/28/24  2111 01/29/24  0223 01/29/24  0724  01/29/24  1140   POCGLU 143* 146* 123 156*       Chronic, uncontrolled. Home regimen includes Metformin 1000 mg BID. Not taking Victoza as he cannot afford the medication. Historically tried glimepiride, wishes to try again.     Plan  A1c 12.1; patient needs better glycemic control  Resume home metformin  Start glimepiride 2 mg daily  DC  meal time insulin  Continue sliding scale  Hypoglycemia protocol  Carbohydrate controlled diet  Monitor blood sugars closely     Essential hypertension  Assessment & Plan  Chronic, stable. SBP on admission 110-120.     Plan  Hold Lisinopril due to CLOVIS  Continue Amlodipine and Coreg with appropriate hold parameters  Monitor BP and vitals daily    Dyslipidemia  Assessment & Plan  Chronic, stable    Plan  Continue home Atorvastatin 80 mg daily    Coronary artery disease  Assessment & Plan  Patient with a history of cardiac catheterization in 2010 and 2020. Stents placed in [facility] in 2020, 2 total. Follows with cardiology outpatient. Denies any chest pain and shortness of breath on admission.  Home meds include : Aspirin 81 mg daily and Carvedilol 6.25 mg BID    Plan  Continue home meds Aspirin and Coreg with hold parameters    CLOVIS (acute kidney injury) (HCC)-resolved as of 1/29/2024  Assessment & Plan  Acute    Cr 1.59 on admission (Baseline 1-1.2). Likey prerenal CLOVIS in the setting of vomiting and diarrhea about 2 days prior to admission  No prior history of CKD.    1/26: Cr 1.66, GFR 50. Of note, did receive IV toradol on admission. Will avoid NSAID's and other nephrotoxins.  1/27: Cr: 1.25    Plan  Monitor BMP daily  Hold nephrotoxic medications including lisinopril and metformin  Strict input and output monitoring  Avoid hypotension            Subjective:   Patient evaluated at bedside, continuing to report lower extremity pain.  States that he requires Dilaudid consistently for breakthrough pain as it becomes unbearable and wakes him up from his sleep. He does note that his leg  appears less swollen, would like to attempt to wear compression stockings from home.  Reporting good urine output, reports happiness with reduction in lymphedema.  No other acute complaints at this time    Objective     Objective:   Vitals:   Temp (24hrs), Av.1 °F (36.7 °C), Min:97.7 °F (36.5 °C), Max:98.4 °F (36.9 °C)    Temp:  [97.7 °F (36.5 °C)-98.4 °F (36.9 °C)] 98.1 °F (36.7 °C)  HR:  [65-77] 65  Resp:  [16-18] 18  BP: (111-156)/(56-89) 123/58  SpO2:  [85 %-93 %] 91 %  Body mass index is 60.33 kg/m².     Input and Output Summary (last 24 hours):       Intake/Output Summary (Last 24 hours) at 2024 0803  Last data filed at 2024 1952  Gross per 24 hour   Intake 5 ml   Output 1550 ml   Net -1545 ml       Physical Exam:   Physical Exam  Constitutional:       General: He is not in acute distress.  Eyes:      Pupils: Pupils are equal, round, and reactive to light.   Cardiovascular:      Rate and Rhythm: Normal rate and regular rhythm.      Pulses: Normal pulses.      Heart sounds: No murmur heard.     No friction rub. No gallop.   Pulmonary:      Effort: Pulmonary effort is normal. No respiratory distress.      Breath sounds: Normal breath sounds. No wheezing.   Abdominal:      General: Bowel sounds are normal. There is no distension.      Palpations: Abdomen is soft. There is no mass.      Tenderness: There is no abdominal tenderness.   Musculoskeletal:      Right lower leg: Edema present.      Left lower leg: Swelling and tenderness present. Edema present.      Comments: Erythema of entire left leg   Abrasion near left ankle with no purulent discharge     Neurological:      General: No focal deficit present.      Mental Status: He is alert.           Additional Data:     Labs:  Results from last 7 days   Lab Units 24  0531 24  0614   WBC Thousand/uL 9.42 8.83   HEMOGLOBIN g/dL 10.4* 10.2*   HEMATOCRIT % 32.7* 31.9*   PLATELETS Thousands/uL 348 299   NEUTROS PCT %  --  71   LYMPHS PCT %  --   16   LYMPHO PCT % 16  --    MONOS PCT %  --  9   MONO PCT % 6  --    EOS PCT % 3 1     Results from last 7 days   Lab Units 01/30/24  0531   POTASSIUM mmol/L 4.3   CHLORIDE mmol/L 99   CO2 mmol/L 27   BUN mg/dL 22   CREATININE mg/dL 1.16   CALCIUM mg/dL 8.3*   ALK PHOS U/L 161*   ALT U/L 11   AST U/L 20         Results from last 7 days   Lab Units 01/30/24  0717 01/29/24  2132 01/29/24  1610 01/29/24  1140 01/29/24  0724   POC GLUCOSE mg/dl 99 185* 141* 156* 123     Results from last 7 days   Lab Units 01/26/24  0520   HEMOGLOBIN A1C % 12.1*       * I Have Reviewed All Lab Data Listed Above.  * Additional Pertinent Lab Tests Reviewed: All Labs Within Last 24 Hours Reviewed    Imaging:    Imaging Reports Reviewed Today Include: none  Imaging Personally Reviewed by Myself Includes:  none    Recent Cultures (last 7 days):     Results from last 7 days   Lab Units 01/26/24  0841 01/25/24  1934   BLOOD CULTURE   --  No Growth After 4 Days.  No Growth After 4 Days.   URINE CULTURE  No Growth <1000 cfu/mL  --        Last 24 Hours Medication List:   Current Facility-Administered Medications   Medication Dose Route Frequency Provider Last Rate    acetaminophen  650 mg Oral Q8H PRN Dennis Waterman MD      amLODIPine  10 mg Oral Daily Wilman Pino MD      aspirin  81 mg Oral Daily Reji Whipple MD      atorvastatin  80 mg Oral Daily Reji Whipple MD      calcium carbonate  1,000 mg Oral Daily PRN Reji Whipple MD      carvedilol  6.25 mg Oral BID With Meals Reji Whipple MD      cefazolin  2,000 mg Intravenous Q8H Reji Whipple MD 2,000 mg (01/30/24 0111)    glimepiride  2 mg Oral Daily With Breakfast Lillie Borges DO      heparin (porcine)  7,500 Units Subcutaneous Q8H [facility] Reji Whipple MD      HYDROmorphone  0.5 mg Intravenous Q4H PRN Lonnie Sousa MD      insulin glargine  16 Units Subcutaneous HS Lillie Borges DO      insulin lispro  1-5 Units Subcutaneous HS Lonnie Sousa, MD       insulin lispro  2-12 Units Subcutaneous TID With Meals Lonnie Sousa MD      magnesium sulfate  2 g Intravenous Once Lillie Borges DO      metFORMIN  1,000 mg Oral BID With Meals Lillie Borges DO      ondansetron  4 mg Intravenous Q6H PRN Reji Whipple MD      oxyCODONE-acetaminophen  1 tablet Oral Q4H PRN Lonnie Sousa MD      polyethylene glycol  17 g Oral Daily Lillie Borges DO      zinc sulfate  220 mg Oral Daily Reji Whipple MD                 Patient Information Sharing: With the consent of Joaquin Frankel , their loved ones were notified today by inpatient team of the patient’s condition and current plan.  All questions answered.     Time Spent for Care: 30 minutes.  More than 50% of total time spent on counseling and coordination of care as described above.    ** Please Note: Dictation voice to text software may have been used in the creation of this document. **    Lillie Borges DO  01/30/24  8:03 AM

## 2024-01-30 NOTE — ASSESSMENT & PLAN NOTE
Pt has previously been referred to sleep center. Noting mild desaturation events when sleeping, advise follow up with sleep clinic

## 2024-01-30 NOTE — TELEPHONE ENCOUNTER
CHOCO left on clinical line:    Hi, this is [facility] in [place] calling. Our number here is [phone]. I'm calling in reference to patient Joaquin Frankel. Date of birth is [birth year] and we got a one of the prescriptions that we got for him is for Cephalexin and I have that he has a penicillin allergy on file. I saw that previously another time we had received a cephalexin prescription and we contacted your office or an office and it was switched to a different antibiotic. So I don't have any history of cephalexin usage in his profile. And also we got a prescription for the Lantus vials. I don't see that on his profile. He's been on Victoza. Does he need the Lantus Solostar, the pens or the actual vials? I don't have a prescription for syringes. If he needs the if he needs the vial, our number here is [phone]. Thank you.      Dr Borges, can you please advise? Thank you!

## 2024-01-30 NOTE — DISCHARGE SUMMARY
Discharge Summary - [facility]     Patient Information: Joaquin Frankel 43 y.o. male MRN: [id number]  Unit/Bed#: [address] [place] [address] Encounter: [id number]     Admitting Physician: Dennis Waterman MD  Discharging Physician/Practitioner: Alyssa Levy MD   PCP: Luc Casas MD  Admission Date:   Admission Orders (From admission, onward)       Ordered        01/28/24 1102  Inpatient Admission  Once            01/25/24 1958  Place in Observation  Once                          Discharge Date: 01/30/24      Reason for Admission: Wound infection [T14.8XXA, L08.9]  Fever [R50.9]  Cellulitis of left lower extremity [L03.116]     Discharge Diagnoses:      Principal Problem:    Sepsis (HCC)  Active Problems:    Coronary artery disease    Dyslipidemia    Essential hypertension    Type 2 diabetes mellitus with diabetic polyneuropathy, without long-term current use of insulin (HCC)    Hyponatremia    Cellulitis of left lower extremity    History of hidradenitis suppurativa    Lymphedema of both lower extremities    Sleep apnea  Resolved Problems:    CLOVIS (acute kidney injury) ([facility])       Consultations During Hospital Stay:  ·       None     Procedures Performed:   ·       None     Significant Findings / Test Results:   -1/25  WBC 14.82, Na 127 (129 corrected), Cr 1.59, LA WNL  BC negative x 2    -1/26  WBC WNL, Na 130, Cr 1.66, K 3.4, procal 4.81, , procal 4.81, hgb 12.1  Urine osm 535, urine sodium 22  UA positive for protein, glucose, trace ketones, small bili, large blood  VAS venous duplex b/l  LE-  no DVT  Left ankle XR: Tibiotalar arthritis. There is no acute fracture or dislocation. No significant degenerative changes.No lytic or blastic osseous lesion. Soft tissue swelling over the lateral malleolus    -1/27  WBC WNL, Na 133, cr 1.25, K 3.5, mag 1.7, procal 2.66    -1/28  WBC WNL, Na 131, Cr 1.25, mag 2.2    -1/29  WBC WNL, Na 134, Cr 1.20, mag 2.1    -1/30  WBC WNL, Na  133, Cr 1.16, mag 1.7     Incidental Findings:   ·       None     Test Results Pending at Discharge (will require follow up):   ·       none     Outpatient Tests Requested:  ·       BMP in one week     Outpatient follow-up Requested:  ·       Wound care  - PCP  - Lymphedema clinic    Complications:    none    Things to address at first visit after hospitalization   -Have you been able to follow-up with the lymphedema clinic?  - How is your pain level?  - Have you been able to follow-up with wound care?  - What have your blood sugars been at home?  - How is the swelling in your legs?    Hospital Course:      Joaquin Frankel is a 43 y.o. male patient who originally presented to the hospital on 1/25/2024 due to increasing left lower extremity pain erythema associated with malaise, nausea, vomiting and diaphoresis.  Past medical history includes morbid obesity, bilateral lower extremity lymphedema, hidradenitis suppurativa, uncontrolled type 2 diabetes.  Patient reports that he had injured his left lower extremity prior to this admission causing cellulitis.  Of note patient is not taking Humira as he could not afford it.  Patient's infection was treated with IV antibiotics noting significant improvement in bilateral lower extremity swelling and pain.  Patient's A1c on admission was 12.1, patient was started on insulin. patient is stable and appropriate for discharge to home.  Recommend follow-up with wound care, lymphedema clinic, PCP.    * Sepsis (HCC)  Assessment & Plan  Present on admission as evidenced by fever, tachycardia, and tachypnea associated with leukocytosis. Likely secondary to cellulitis of left lower extremity. LA wnl. Procal and CRP elevated.     ER Course: Vancomycin 2g IV, Toradol 15 mg IV, Zofran 4 mg IV and 500 mL NS bolus    1/26: Leukocytosis resolved. Cultures pending. No additional fevers or discharge from wound. Pain still poorly controlled, will optimize and evaluate for other sources of  pain. ProCal 4.81--> 2.66  1/27 : Blood cultures negative; patient still complains of pain in LLE  UA shows neg leuc and nitrites, trace ketones, blood. Ucx negative    BC negative    Plan  Stop Ancef 2 gm IV   Keflex 500 mg BID for to 10 days  Monitor hemodynamics  IVF discontinued    Sleep apnea  Assessment & Plan  Pt has previously been referred to sleep center. Noting mild desaturation events when sleeping, advise follow up with sleep clinic    Lymphedema of both lower extremities  Assessment & Plan  Chronic. Suspect some component of lipo-lymphedema  Pt has B/L Lymphedema in lower extremities, Left worse than right    Plan  Continue to elevate legs  Wear compression stockings  Continue outpatient Lymphedema massages  Follow up with wound care outpatient  Pt needs better glycemic control  Continue Zinc supplementation  Lasix 40 mg IV x 1    History of hidradenitis suppurativa  Assessment & Plan  Chronic, stable.   Patient follows with wound care for hidradenitis suppurativa. He reports he did not take Humira as he could not afford it. On examination, patient has induration, scarring and wounds present present in groin and abdominal pannus.  No discharge from wounds. Reports he will not see wound care outpatient as it disrupts his work schedule.     Plan  Follow up with wound care    Cellulitis of left lower extremity  Assessment & Plan  Acute    Presented with erythema, edema, warmth, and tenderness of left lower extremity, after trauma. Patient reports he dropped a couch on his left ankle 3 days ago while moving furniture.    Labs on admission showed leukocytosis, fever, and tachycardia. Received Vancomycin 2g IV in the ED and Toradol 15 mg IV  Of note patient has had multiple admissions in the past for cellulitis and also has history of chronic lymphedema.     1/26: Patient reports pain is still poorly controlled. Received Toradol, Tylenol, and Dilaudid 0.5 mg x 1. Will optimize pain regimen with regards to  poor renal function.  1/27 : Blood cultures negative    Plan  Ancef 2 grams Q 8H x 5 days  Keflex 500 mg BID for total of 10 days  X-ray left ankle and Doppler LLE negative  Elevation of the affected limb  Continue wound care   Continue pain control    Hyponatremia  Assessment & Plan  Improving     Na on admission 127. Likely hypovolemic hyponatremia from dehydration due to diarrhea and vomiting.  Pt had diarrhea and vomiting initially when he sustained injury to left leg, no further episodes since admission    1/26: Na 130, continue IV fluids    Urine osm 535, Urine sodium 22. Suspect secondary to dehydration     Plan  F/U BMP in one week    Type 2 diabetes mellitus with diabetic polyneuropathy, without long-term current use of insulin ([facility])  Assessment & Plan  Lab Results   Component Value Date    HGBA1C 12.1 (H) 01/26/2024       Recent Labs     01/29/24  1610 01/29/24  2132 01/30/24  0717 01/30/24  1105   POCGLU 141* 185* 99 147*       Chronic, uncontrolled. Home regimen includes Metformin 1000 mg BID. Not taking Victoza as he cannot afford the medication. Historically tried glimepiride, wishes to try again.     Plan  A1c 12.1; patient needs better glycemic control  Resume home metformin  Basal insulin 12U  Start glimepiride 2 mg daily  DC  meal time insulin  Continue sliding scale  Hypoglycemia protocol  Carbohydrate controlled diet  Monitor blood sugars closely     Essential hypertension  Assessment & Plan  Chronic, stable. SBP on admission 110-120.     Plan  Hold Lisinopril due to CLOVIS  Continue Amlodipine and Coreg with appropriate hold parameters  Monitor BP and vitals daily    Dyslipidemia  Assessment & Plan  Chronic, stable    Plan  Continue home Atorvastatin 80 mg daily    Coronary artery disease  Assessment & Plan  Patient with a history of cardiac catheterization in 2010 and 2020. Stents placed in [facility] in 2020, 2 total. Follows with cardiology outpatient. Denies any chest pain and shortness of breath on  "admission.  Home meds include : Aspirin 81 mg daily and Carvedilol 6.25 mg BID    Plan  Continue home meds Aspirin and Coreg with hold parameters    CLOVIS (acute kidney injury) (HCC)-resolved as of 1/29/2024  Assessment & Plan  Acute    Cr 1.59 on admission (Baseline 1-1.2). Likey prerenal CLOVIS in the setting of vomiting and diarrhea about 2 days prior to admission  No prior history of CKD.    1/26: Cr 1.66, GFR 50. Of note, did receive IV toradol on admission. Will avoid NSAID's and other nephrotoxins.  1/27: Cr: 1.25    Plan  Monitor BMP daily  Hold nephrotoxic medications including lisinopril and metformin  Strict input and output monitoring  Avoid hypotension         Condition at Discharge: stable      Discharge Day Visit / Exam:      Vitals: Blood Pressure: 152/84 (01/30/24 0819)  Pulse: 77 (01/30/24 0819)  Temperature: 98.1 °F (36.7 °C) (01/29/24 2254)  Temp Source: Oral (01/29/24 1952)  Respirations: 18 (01/29/24 2254)  Height: 5' 7\" (170.2 cm) (01/25/24 2100)  Weight - Scale: (!) 175 kg (385 lb 3.2 oz) (01/27/24 0559)  SpO2: (!) 88 % (01/30/24 0819)  Exam:   Physical Exam  Constitutional:       General: He is not in acute distress.  Eyes:      Pupils: Pupils are equal, round, and reactive to light.   Cardiovascular:      Rate and Rhythm: Normal rate and regular rhythm.      Pulses: Normal pulses.      Heart sounds: No murmur heard.     No friction rub. No gallop.   Pulmonary:      Effort: Pulmonary effort is normal. No respiratory distress.      Breath sounds: Normal breath sounds. No wheezing.   Abdominal:      General: Bowel sounds are normal. There is no distension.      Palpations: Abdomen is soft. There is no mass.      Tenderness: There is no abdominal tenderness.   Musculoskeletal:      Right lower leg: Edema present.      Left lower leg: Swelling and tenderness present. Edema present.      Comments: Erythema of entire left leg up to intertriginous area with indistinct margins  L > R edema  Abrasion near " left ankle with no purulent discharge     Neurological:      General: No focal deficit present.      Mental Status: He is alert.          Patient Information Sharing: With the consent of Joaquin Frankel, their loved ones were notified today by inpatient team of the patient’s condition and current plan.  All questions answered.     Discharge instructions/Information to patient and family:   See after visit summary for information provided to patient and family.       Discharge Medications:     Medication List      START taking these medications     acetaminophen 325 mg tablet; Commonly known as: TYLENOL; Take 3 tablets   (975 mg total) by mouth every 8 (eight) hours for 5 days   cephalexin 500 mg capsule; Commonly known as: KEFLEX; Take 1 capsule   (500 mg total) by mouth every 12 (twelve) hours for 5 days   glimepiride 2 mg tablet; Commonly known as: AMARYL; Take 1 tablet (2 mg   total) by mouth daily with breakfast; Start taking on: January 31, 2024   insulin glargine 100 units/mL subcutaneous injection; Commonly known as:   LANTUS; Inject 12 Units under the skin daily at bedtime   oxyCODONE 5 immediate release tablet; Commonly known as: ROXICODONE;   Take 1 tablet (5 mg total) by mouth every 6 (six) hours as needed for   moderate pain for up to 10 days Max Daily Amount: 20 mg     CONTINUE taking these medications     amLODIPine 10 mg tablet; Commonly known as: NORVASC; Take 1 tablet (10   mg total) by mouth daily   aspirin 81 mg EC tablet; Commonly known as: ECOTRIN LOW STRENGTH; Take 1   tablet (81 mg total) by mouth daily   atorvastatin 80 mg tablet; Commonly known as: LIPITOR; Take 1 tablet (80   mg total) by mouth daily   BD ULTRA-FINE PEN NEEDLES 29G X 12.7MM Misc; Generic drug: Insulin Pen   Needle; USE 1 NEEDLE ONCE DAILY FOR INJECTION UNDER THE SKIN   carvedilol 6.25 mg tablet; Commonly known as: COREG; TAKE ONE TABLET BY   MOUTH TWICE A DAY WITH MEALS   EPINEPHrine 0.3 mg/0.3 mL Soaj; Commonly known as:  EPIPEN; Inject 0.3 mL   (0.3 mg total) into a muscle once for 1 dose   metFORMIN 1000 MG tablet; Commonly known as: GLUCOPHAGE; Take 1 tablet   (1,000 mg total) by mouth 2 (two) times a day with meals   zinc gluconate 50 mg tablet; Take 1 tablet (50 mg total) by mouth daily     STOP taking these medications     Humira (2 Pen) 80 MG/0.8ML Pnkt; Generic drug: Adalimumab   lisinopril 20 mg tablet; Commonly known as: ZESTRIL   Victoza injection; Generic drug: liraglutide   zinc sulfate 220 mg capsule; Commonly known as: ZINCATE        Disposition:   Home     For Discharges to [facility]:   ·       Not Applicable to this Patient - Not Applicable to this Patient     Discharge Statement:  I spent 45 minutes discharging the patient. This time was spent on the day of discharge. I had direct contact with the patient on the day of discharge. Greater than 50% of the total time was spent examining patient, answering all patient questions, arranging and discussing plan of care with patient as well as directly providing post-discharge instructions.  Additional time then spent on discharge activities.     ** Please Note: This note has been constructed using a voice recognition system **     Lillie Borges DO   01/30/24  3:21 PM

## 2024-01-30 NOTE — PLAN OF CARE
Problem: PAIN - ADULT  Goal: Verbalizes/displays adequate comfort level or baseline comfort level  Description: Interventions:  - Encourage patient to monitor pain and request assistance  - Assess pain using appropriate pain scale  - Administer analgesics based on type and severity of pain and evaluate response  - Implement non-pharmacological measures as appropriate and evaluate response  - Consider cultural and social influences on pain and pain management  - Notify physician/advanced practitioner if interventions unsuccessful or patient reports new pain  Outcome: Progressing     Problem: INFECTION - ADULT  Goal: Absence or prevention of progression during hospitalization  Description: INTERVENTIONS:  - Assess and monitor for signs and symptoms of infection  - Monitor lab/diagnostic results  - Monitor all insertion sites, i.e. indwelling lines, tubes, and drains  - Monitor endotracheal if appropriate and nasal secretions for changes in amount and color  - [place] appropriate cooling/warming therapies per order  - Administer medications as ordered  - Instruct and encourage patient and family to use good hand hygiene technique  - Identify and instruct in appropriate isolation precautions for identified infection/condition  Outcome: Progressing  Goal: Absence of fever/infection during neutropenic period  Description: INTERVENTIONS:  - Monitor WBC    Outcome: Progressing     Problem: SAFETY ADULT  Goal: Patient will remain free of falls  Description: INTERVENTIONS:  - Educate patient/family on patient safety including physical limitations  - Instruct patient to call for assistance with activity   - Consult OT/PT to assist with strengthening/mobility   - Keep Call bell within reach  - Keep bed low and locked with side rails adjusted as appropriate  - Keep care items and personal belongings within reach  - Initiate and maintain comfort rounds  - Make Fall Risk Sign visible to staff  - Offer Toileting every 2 Hours,  in advance of need  - Initiate/Maintain bed alarm  - Obtain necessary fall risk management equipment: Call bell   - Apply yellow socks and bracelet for high fall risk patients  - Consider moving patient to room near nurses station  Outcome: Progressing     Problem: DISCHARGE PLANNING  Goal: Discharge to home or other facility with appropriate resources  Description: INTERVENTIONS:  - Identify barriers to discharge w/patient and caregiver  - Arrange for needed discharge resources and transportation as appropriate  - Identify discharge learning needs (meds, wound care, etc.)  - Arrange for interpretive services to assist at discharge as needed  - Refer to Case Management Department for coordinating discharge planning if the patient needs post-hospital services based on physician/advanced practitioner order or complex needs related to functional status, cognitive ability, or social support system  Outcome: Progressing     Problem: Knowledge Deficit  Goal: Patient/family/caregiver demonstrates understanding of disease process, treatment plan, medications, and discharge instructions  Description: Complete learning assessment and assess knowledge base.  Interventions:  - Provide teaching at level of understanding  - Provide teaching via preferred learning methods  Outcome: Progressing

## 2024-01-30 NOTE — DISCHARGE INSTR - AVS FIRST PAGE
Diabetes medication:  - Insulin glargine 12 units at bedtime  - Metformin 1000 mg twice daily  - Glimepiride 2 mg daily  - Please continue to monitor your blood glucose at least twice per day for the next week and bring to your PCP appointment    Please follow-up with following providers  - PCP  - Wound care  - Lymphedema clinic    Continue keflex twice per day for 5 days

## 2024-01-31 LAB
BACTERIA BLD CULT: NORMAL
BACTERIA BLD CULT: NORMAL

## 2024-01-31 NOTE — UTILIZATION REVIEW
NOTIFICATION OF ADMISSION DISCHARGE   This is a Notification of Discharge from [facility]. Please be advised that this patient has been discharge from our facility. Below you will find the admission and discharge date and time including the patient’s disposition.   UTILIZATION REVIEW CONTACT:  Marjorie Ya  Utilization   Network Utilization Review Department  Phone: [phone] x carefully listen to the prompts. All voicemails are confidential.  Email: [email or web address]     ADMISSION INFORMATION  PRESENTATION DATE: 1/25/2024  6:53 PM  OBERVATION ADMISSION DATE:   INPATIENT ADMISSION DATE: 1/28/24 11:02 AM   DISCHARGE DATE: 1/30/2024  6:50 PM   DISPOSITION:Home/Self Care    Network Utilization Review Department  ATTENTION: Please call with any questions or concerns to [phone] and carefully listen to the prompts so that you are directed to the right person. All voicemails are confidential.   For Discharge needs, contact Care Management DC Support Team at [phone] opt. 2  Send all requests for admission clinical reviews, approved or denied determinations and any other requests to dedicated fax number below belonging to the campus where the patient is receiving treatment. List of dedicated fax numbers for the Facilities:  FACILITY NAME UR FAX NUMBER   ADMISSION DENIALS (Administrative/Medical Necessity) [phone]   DISCHARGE SUPPORT TEAM ([facility]) [phone]   PARENT CHILD HEALTH (Maternity/NICU/Pediatrics) [phone]   [facility] [phone]   [facility] [phone]   [facility] [phone]   [facility] [phone]   [facility] [phone]   [facility] [phone]   [facility] [phone]   [facility] [phone]    [facility] [phone]   [facility] [phone]   [facility] [phone]   [facility] [phone]

## 2024-02-15 ENCOUNTER — APPOINTMENT (EMERGENCY)
Dept: RADIOLOGY | Facility: HOSPITAL | Age: 44
DRG: 872 | End: 2024-02-15
Payer: COMMERCIAL

## 2024-02-15 ENCOUNTER — HOSPITAL ENCOUNTER (INPATIENT)
Facility: HOSPITAL | Age: 44
LOS: 2 days | Discharge: HOME | DRG: 872 | End: 2024-02-17
Attending: EMERGENCY MEDICINE | Admitting: STUDENT IN AN ORGANIZED HEALTH CARE EDUCATION/TRAINING PROGRAM
Payer: COMMERCIAL

## 2024-02-15 DIAGNOSIS — J21.0 RSV (ACUTE BRONCHIOLITIS DUE TO RESPIRATORY SYNCYTIAL VIRUS): ICD-10-CM

## 2024-02-15 DIAGNOSIS — G47.30 SLEEP APNEA, UNSPECIFIED TYPE: ICD-10-CM

## 2024-02-15 DIAGNOSIS — E11.42 TYPE 2 DIABETES MELLITUS WITH DIABETIC POLYNEUROPATHY, WITHOUT LONG-TERM CURRENT USE OF INSULIN (HCC): ICD-10-CM

## 2024-02-15 DIAGNOSIS — D69.2 PURPURA (HCC): ICD-10-CM

## 2024-02-15 DIAGNOSIS — L03.116 LEFT LEG CELLULITIS: ICD-10-CM

## 2024-02-15 DIAGNOSIS — R52 INTRACTABLE PAIN: Primary | ICD-10-CM

## 2024-02-15 DIAGNOSIS — B33.8 RSV INFECTION: ICD-10-CM

## 2024-02-15 DIAGNOSIS — R23.3 PETECHIAL RASH: ICD-10-CM

## 2024-02-15 PROBLEM — L03.90 CELLULITIS: Status: RESOLVED | Noted: 2023-10-07 | Resolved: 2024-02-15

## 2024-02-15 LAB
ALBUMIN SERPL BCP-MCNC: 3.4 G/DL (ref 3.5–5)
ALP SERPL-CCNC: 92 U/L (ref 34–104)
ALT SERPL W P-5'-P-CCNC: 14 U/L (ref 7–52)
ANION GAP SERPL CALCULATED.3IONS-SCNC: 7 MMOL/L
APTT PPP: 28 SECONDS (ref 23–37)
AST SERPL W P-5'-P-CCNC: 16 U/L (ref 13–39)
BASOPHILS # BLD AUTO: 0.06 THOUSANDS/ÂΜL (ref 0–0.1)
BASOPHILS NFR BLD AUTO: 1 % (ref 0–1)
BILIRUB SERPL-MCNC: 0.28 MG/DL (ref 0.2–1)
BNP SERPL-MCNC: 31 PG/ML (ref 0–100)
BUN SERPL-MCNC: 27 MG/DL (ref 5–25)
CALCIUM ALBUM COR SERPL-MCNC: 9.4 MG/DL (ref 8.3–10.1)
CALCIUM SERPL-MCNC: 8.9 MG/DL (ref 8.4–10.2)
CHLORIDE SERPL-SCNC: 99 MMOL/L (ref 96–108)
CO2 SERPL-SCNC: 29 MMOL/L (ref 21–32)
CREAT SERPL-MCNC: 1.51 MG/DL (ref 0.6–1.3)
EOSINOPHIL # BLD AUTO: 0.35 THOUSAND/ÂΜL (ref 0–0.61)
EOSINOPHIL NFR BLD AUTO: 4 % (ref 0–6)
ERYTHROCYTE [DISTWIDTH] IN BLOOD BY AUTOMATED COUNT: 13.3 % (ref 11.6–15.1)
FLUAV RNA RESP QL NAA+PROBE: NEGATIVE
FLUBV RNA RESP QL NAA+PROBE: NEGATIVE
GFR SERPL CREATININE-BSD FRML MDRD: 56 ML/MIN/1.73SQ M
GLUCOSE SERPL-MCNC: 130 MG/DL (ref 65–140)
GLUCOSE SERPL-MCNC: 181 MG/DL (ref 65–140)
HCT VFR BLD AUTO: 36.7 % (ref 36.5–49.3)
HGB BLD-MCNC: 11.7 G/DL (ref 12–17)
IMM GRANULOCYTES # BLD AUTO: 0.02 THOUSAND/UL (ref 0–0.2)
IMM GRANULOCYTES NFR BLD AUTO: 0 % (ref 0–2)
INR PPP: 1.04 (ref 0.84–1.19)
LACTATE SERPL-SCNC: 1 MMOL/L (ref 0.5–2)
LYMPHOCYTES # BLD AUTO: 1.81 THOUSANDS/ÂΜL (ref 0.6–4.47)
LYMPHOCYTES NFR BLD AUTO: 20 % (ref 14–44)
MCH RBC QN AUTO: 27 PG (ref 26.8–34.3)
MCHC RBC AUTO-ENTMCNC: 31.9 G/DL (ref 31.4–37.4)
MCV RBC AUTO: 85 FL (ref 82–98)
MONOCYTES # BLD AUTO: 0.66 THOUSAND/ÂΜL (ref 0.17–1.22)
MONOCYTES NFR BLD AUTO: 7 % (ref 4–12)
NEUTROPHILS # BLD AUTO: 6.32 THOUSANDS/ÂΜL (ref 1.85–7.62)
NEUTS SEG NFR BLD AUTO: 68 % (ref 43–75)
NRBC BLD AUTO-RTO: 0 /100 WBCS
PLATELET # BLD AUTO: 382 THOUSANDS/UL (ref 149–390)
PMV BLD AUTO: 8.7 FL (ref 8.9–12.7)
POTASSIUM SERPL-SCNC: 4.3 MMOL/L (ref 3.5–5.3)
PROCALCITONIN SERPL-MCNC: 0.09 NG/ML
PROT SERPL-MCNC: 8.3 G/DL (ref 6.4–8.4)
PROTHROMBIN TIME: 13.8 SECONDS (ref 11.6–14.5)
RBC # BLD AUTO: 4.33 MILLION/UL (ref 3.88–5.62)
RSV RNA RESP QL NAA+PROBE: POSITIVE
SARS-COV-2 RNA RESP QL NAA+PROBE: NEGATIVE
SODIUM SERPL-SCNC: 135 MMOL/L (ref 135–147)
WBC # BLD AUTO: 9.22 THOUSAND/UL (ref 4.31–10.16)

## 2024-02-15 PROCEDURE — 36415 COLL VENOUS BLD VENIPUNCTURE: CPT | Performed by: EMERGENCY MEDICINE

## 2024-02-15 PROCEDURE — 96375 TX/PRO/DX INJ NEW DRUG ADDON: CPT

## 2024-02-15 PROCEDURE — 96374 THER/PROPH/DIAG INJ IV PUSH: CPT

## 2024-02-15 PROCEDURE — 99285 EMERGENCY DEPT VISIT HI MDM: CPT | Performed by: EMERGENCY MEDICINE

## 2024-02-15 PROCEDURE — 82948 REAGENT STRIP/BLOOD GLUCOSE: CPT

## 2024-02-15 PROCEDURE — 83880 ASSAY OF NATRIURETIC PEPTIDE: CPT | Performed by: EMERGENCY MEDICINE

## 2024-02-15 PROCEDURE — 0241U HB NFCT DS VIR RESP RNA 4 TRGT: CPT | Performed by: EMERGENCY MEDICINE

## 2024-02-15 PROCEDURE — 80053 COMPREHEN METABOLIC PANEL: CPT | Performed by: EMERGENCY MEDICINE

## 2024-02-15 PROCEDURE — 85025 COMPLETE CBC W/AUTO DIFF WBC: CPT | Performed by: EMERGENCY MEDICINE

## 2024-02-15 PROCEDURE — 83605 ASSAY OF LACTIC ACID: CPT | Performed by: EMERGENCY MEDICINE

## 2024-02-15 PROCEDURE — 71045 X-RAY EXAM CHEST 1 VIEW: CPT

## 2024-02-15 PROCEDURE — 99284 EMERGENCY DEPT VISIT MOD MDM: CPT

## 2024-02-15 PROCEDURE — 93005 ELECTROCARDIOGRAM TRACING: CPT

## 2024-02-15 PROCEDURE — 87040 BLOOD CULTURE FOR BACTERIA: CPT | Performed by: EMERGENCY MEDICINE

## 2024-02-15 PROCEDURE — 99223 1ST HOSP IP/OBS HIGH 75: CPT | Performed by: INTERNAL MEDICINE

## 2024-02-15 PROCEDURE — 96376 TX/PRO/DX INJ SAME DRUG ADON: CPT

## 2024-02-15 PROCEDURE — 84145 PROCALCITONIN (PCT): CPT | Performed by: EMERGENCY MEDICINE

## 2024-02-15 PROCEDURE — 85610 PROTHROMBIN TIME: CPT | Performed by: EMERGENCY MEDICINE

## 2024-02-15 PROCEDURE — 85730 THROMBOPLASTIN TIME PARTIAL: CPT | Performed by: EMERGENCY MEDICINE

## 2024-02-15 RX ORDER — METHYLPREDNISOLONE SODIUM SUCCINATE 40 MG/ML
40 INJECTION, POWDER, LYOPHILIZED, FOR SOLUTION INTRAMUSCULAR; INTRAVENOUS DAILY
Status: DISCONTINUED | OUTPATIENT
Start: 2024-02-15 | End: 2024-02-16

## 2024-02-15 RX ORDER — GUAIFENESIN 600 MG/1
600 TABLET, EXTENDED RELEASE ORAL EVERY 12 HOURS PRN
Status: DISCONTINUED | OUTPATIENT
Start: 2024-02-15 | End: 2024-02-17 | Stop reason: HOSPADM

## 2024-02-15 RX ORDER — OXYCODONE HYDROCHLORIDE 5 MG/1
5 TABLET ORAL EVERY 4 HOURS PRN
Status: DISCONTINUED | OUTPATIENT
Start: 2024-02-15 | End: 2024-02-17

## 2024-02-15 RX ORDER — ACETAMINOPHEN 325 MG/1
650 TABLET ORAL EVERY 6 HOURS
Status: DISCONTINUED | OUTPATIENT
Start: 2024-02-15 | End: 2024-02-15

## 2024-02-15 RX ORDER — SODIUM CHLORIDE 9 MG/ML
125 INJECTION, SOLUTION INTRAVENOUS CONTINUOUS
Status: DISCONTINUED | OUTPATIENT
Start: 2024-02-16 | End: 2024-02-16

## 2024-02-15 RX ORDER — POLYETHYLENE GLYCOL 3350 17 G/17G
17 POWDER, FOR SOLUTION ORAL DAILY PRN
Status: DISCONTINUED | OUTPATIENT
Start: 2024-02-16 | End: 2024-02-17 | Stop reason: HOSPADM

## 2024-02-15 RX ORDER — INSULIN GLARGINE 100 [IU]/ML
12 INJECTION, SOLUTION SUBCUTANEOUS
Status: DISCONTINUED | OUTPATIENT
Start: 2024-02-15 | End: 2024-02-16

## 2024-02-15 RX ORDER — ACETAMINOPHEN 325 MG/1
975 TABLET ORAL EVERY 8 HOURS SCHEDULED
Status: DISCONTINUED | OUTPATIENT
Start: 2024-02-15 | End: 2024-02-15

## 2024-02-15 RX ORDER — ATORVASTATIN CALCIUM 80 MG/1
80 TABLET, FILM COATED ORAL
Status: DISCONTINUED | OUTPATIENT
Start: 2024-02-16 | End: 2024-02-17 | Stop reason: HOSPADM

## 2024-02-15 RX ORDER — HYDROMORPHONE HCL/PF 1 MG/ML
0.5 SYRINGE (ML) INJECTION ONCE
Status: COMPLETED | OUTPATIENT
Start: 2024-02-15 | End: 2024-02-15

## 2024-02-15 RX ORDER — CEFTRIAXONE 1 G/50ML
1000 INJECTION, SOLUTION INTRAVENOUS ONCE
Status: COMPLETED | OUTPATIENT
Start: 2024-02-15 | End: 2024-02-15

## 2024-02-15 RX ORDER — OXYCODONE HYDROCHLORIDE 5 MG/1
5 TABLET ORAL ONCE
Status: DISCONTINUED | OUTPATIENT
Start: 2024-02-15 | End: 2024-02-15

## 2024-02-15 RX ORDER — CARVEDILOL 6.25 MG/1
6.25 TABLET ORAL 2 TIMES DAILY WITH MEALS
Status: DISCONTINUED | OUTPATIENT
Start: 2024-02-16 | End: 2024-02-17 | Stop reason: HOSPADM

## 2024-02-15 RX ORDER — ACETAMINOPHEN 325 MG/1
975 TABLET ORAL EVERY 8 HOURS SCHEDULED
Status: DISCONTINUED | OUTPATIENT
Start: 2024-02-16 | End: 2024-02-17 | Stop reason: HOSPADM

## 2024-02-15 RX ORDER — ENOXAPARIN SODIUM 100 MG/ML
60 INJECTION SUBCUTANEOUS 2 TIMES DAILY
Status: DISCONTINUED | OUTPATIENT
Start: 2024-02-16 | End: 2024-02-17 | Stop reason: HOSPADM

## 2024-02-15 RX ORDER — ASPIRIN 81 MG/1
81 TABLET, CHEWABLE ORAL DAILY
Status: DISCONTINUED | OUTPATIENT
Start: 2024-02-16 | End: 2024-02-17 | Stop reason: HOSPADM

## 2024-02-15 RX ORDER — HYDROMORPHONE HCL IN WATER/PF 6 MG/30 ML
0.2 PATIENT CONTROLLED ANALGESIA SYRINGE INTRAVENOUS EVERY 4 HOURS PRN
Status: DISCONTINUED | OUTPATIENT
Start: 2024-02-16 | End: 2024-02-16

## 2024-02-15 RX ORDER — IPRATROPIUM BROMIDE AND ALBUTEROL SULFATE 2.5; .5 MG/3ML; MG/3ML
3 SOLUTION RESPIRATORY (INHALATION) ONCE
Status: COMPLETED | OUTPATIENT
Start: 2024-02-15 | End: 2024-02-15

## 2024-02-15 RX ORDER — INSULIN LISPRO 100 [IU]/ML
2-12 INJECTION, SOLUTION INTRAVENOUS; SUBCUTANEOUS
Status: DISCONTINUED | OUTPATIENT
Start: 2024-02-15 | End: 2024-02-17 | Stop reason: HOSPADM

## 2024-02-15 RX ORDER — CEFAZOLIN SODIUM 2 G/50ML
2000 SOLUTION INTRAVENOUS EVERY 8 HOURS
Status: DISCONTINUED | OUTPATIENT
Start: 2024-02-16 | End: 2024-02-17 | Stop reason: HOSPADM

## 2024-02-15 RX ORDER — HYDROMORPHONE HCL/PF 1 MG/ML
1 SYRINGE (ML) INJECTION ONCE
Status: COMPLETED | OUTPATIENT
Start: 2024-02-15 | End: 2024-02-15

## 2024-02-15 RX ADMIN — HYDROMORPHONE HYDROCHLORIDE 0.5 MG: 1 INJECTION, SOLUTION INTRAMUSCULAR; INTRAVENOUS; SUBCUTANEOUS at 22:04

## 2024-02-15 RX ADMIN — CEFAZOLIN SODIUM 2000 MG: 2 SOLUTION INTRAVENOUS at 23:41

## 2024-02-15 RX ADMIN — SODIUM CHLORIDE 125 ML/HR: 0.9 INJECTION, SOLUTION INTRAVENOUS at 23:58

## 2024-02-15 RX ADMIN — METHYLPREDNISOLONE SODIUM SUCCINATE 40 MG: 40 INJECTION, POWDER, FOR SOLUTION INTRAMUSCULAR; INTRAVENOUS at 23:41

## 2024-02-15 RX ADMIN — IPRATROPIUM BROMIDE AND ALBUTEROL SULFATE 3 ML: 2.5; .5 SOLUTION RESPIRATORY (INHALATION) at 18:10

## 2024-02-15 RX ADMIN — HYDROMORPHONE HYDROCHLORIDE 0.5 MG: 1 INJECTION, SOLUTION INTRAMUSCULAR; INTRAVENOUS; SUBCUTANEOUS at 18:22

## 2024-02-15 RX ADMIN — INSULIN GLARGINE 12 UNITS: 100 INJECTION, SOLUTION SUBCUTANEOUS at 23:41

## 2024-02-15 RX ADMIN — SODIUM CHLORIDE 1000 ML: 0.9 INJECTION, SOLUTION INTRAVENOUS at 23:42

## 2024-02-15 RX ADMIN — HYDROMORPHONE HYDROCHLORIDE 1 MG: 1 INJECTION, SOLUTION INTRAMUSCULAR; INTRAVENOUS; SUBCUTANEOUS at 17:10

## 2024-02-15 RX ADMIN — ACETAMINOPHEN 650 MG: 325 TABLET ORAL at 22:05

## 2024-02-15 RX ADMIN — CEFTRIAXONE 1000 MG: 1 INJECTION, SOLUTION INTRAVENOUS at 18:11

## 2024-02-15 RX ADMIN — OXYCODONE HYDROCHLORIDE 5 MG: 5 TABLET ORAL at 23:41

## 2024-02-15 NOTE — LETTER
To Whom It May Concern:    This is to inform the court that Gurjit Anaya ( 54) has been hospitalized at [facility] since 24 and will be discharging this weekend to a skilled nursing facility for rehab and continued IV antibiotic treatment.  There is a strong possibility that his court date may need to be rescheduled due to the length of his rehab stay.    Thank you,        PETRA Troy

## 2024-02-15 NOTE — ED PROVIDER NOTES
History  Chief Complaint   Patient presents with    Leg Pain     Left leg reddened , warm and painful. Right leg has a purple rash.      44 yo male with several complaints.  He notes cough, congestion and sob worsening over the last 4 days.  Feels like he can't get the phlegm up.  No documented fever but has chills and general malaise.  He also complains of worsening painful rash on right leg since yesterday and worsening pain and redness of leg left today.  He has h/o lymphedema on left leg and was admitted 2 weeks ago and treated for left lower extremity cellulitis.  He says the pain in both legs is very severe.      History provided by:  Patient   used: No    Leg Pain  Associated symptoms: no back pain and no fever        Prior to Admission Medications   Prescriptions Last Dose Informant Patient Reported? Taking?   EPINEPHrine (EPIPEN) 0.3 mg/0.3 mL SOAJ   No No   Sig: Inject 0.3 mL (0.3 mg total) into a muscle once for 1 dose   Insulin Pen Needle (BD ULTRA-FINE PEN NEEDLES) 29G X 12.7MM MISC   No No   Sig: USE 1 NEEDLE ONCE DAILY FOR INJECTION UNDER THE SKIN   aspirin (ECOTRIN LOW STRENGTH) 81 mg EC tablet   No No   Sig: Take 1 tablet (81 mg total) by mouth daily   atorvastatin (LIPITOR) 80 mg tablet   No No   Sig: Take 1 tablet (80 mg total) by mouth daily   carvedilol (COREG) 6.25 mg tablet   No No   Sig: TAKE ONE TABLET BY MOUTH TWICE A DAY WITH MEALS   glimepiride (AMARYL) 2 mg tablet   No No   Sig: Take 1 tablet (2 mg total) by mouth daily with breakfast   insulin glargine (LANTUS) 100 units/mL subcutaneous injection   No No   Sig: Inject 12 Units under the skin daily at bedtime   losartan (COZAAR) 25 mg tablet   No No   Sig: Take 1 tablet (25 mg total) by mouth daily   metFORMIN (GLUCOPHAGE) 1000 MG tablet   No No   Sig: Take 1 tablet (1,000 mg total) by mouth 2 (two) times a day with meals   zinc gluconate 50 mg tablet   No No   Sig: Take 1 tablet (50 mg total) by mouth daily     "  Facility-Administered Medications: None       Past Medical History:   Diagnosis Date    Acute hypoxic respiratory failure ([facility]) 10/7/2023    Arthritis     Breathing difficulty     Coronary artery disease     Diabetes mellitus ([facility])     Diabetic neuropathy ([facility]) 5/28/2021    Heart disease     CAD   s/p ptca with 1 stent 2012    Hidradenitis suppurativa     groin    History of transfusion     Hyperlipidemia     Hypertension     MI (myocardial infarction) ([facility])     \" silent \" M.I. in the past    Morbid obesity with BMI of 50.0-59.9, adult ([facility])     Nicotine dependence     Obesity     Pilonidal cyst     Type 1 non-ST elevation myocardial infarction (NSTEMI) ([facility]) 11/29/2020       Past Surgical History:   Procedure Laterality Date    CARDIAC SURGERY      CORONARY ANGIOPLASTY WITH STENT PLACEMENT      INCISION AND DRAINAGE OF WOUND N/A 1/24/2017    Procedure: INCISION AND DRAINAGE (I&D) BUTTOCK, PILONIDAL CYST;  Surgeon: Simba Adhikari MD;  Location: [facility];  Service:     LEG SURGERY Left     I&D of left leg 2012    DC DEBRIDEMENT SUBCUTANEOUS TISSUE 1ST 20 SQ CM/< Left 7/6/2021    Procedure: DEBRIDEMENT WOUND (WASH OUT);  Surgeon: Sudheer Mcfarlane MD;  [facility];  Service: General    DC EXC B9 LESION MRGN XCP SK TG T/A/L >4.0 CM Left 4/6/2021    Procedure: EXCISION THIGH MASS;  Surgeon: Sudheer Mcfarlane MD;  [facility];  Service: General    DC EXCISION H/P/P/U COMPLEX REPAIR Left 7/6/2021    Procedure: EXCISION PERINEAL ABSCESS LEFT THIGH;  Surgeon: Sudheer Mcfarlane MD;  [facility];  Service: General    DC NEGATIVE PRESSURE WOUND THERAPY DME <= 50 SQ CM Left 4/6/2021    Procedure: APPLICATION VAC DRESSING THIGH;  Surgeon: Sudheer Mcfarlane MD;  [facility];  Service: General    WOUND DEBRIDEMENT Left 4/17/2021    Procedure: DEBRIDEMENT WOUND AND DRESSING CHANGE (WASH OUT);  Surgeon: Mahin Traore DO;  [facility];  Service: General    WOUND DEBRIDEMENT Left 4/22/2021    Procedure: " DEBRIDEMENT LOWER EXTREMITY (WASH OUT), left groin and thigh;  Surgeon: Sudheer Mcfarlane MD;  [facility];  Service: General    WOUND DEBRIDEMENT Left 2021    Procedure: DEBRIDEMENT LOWER EXTREMITY (WASH OUT);  Surgeon: Zak Castanon DO;  [facility];  Service: General    WOUND DEBRIDEMENT Left 2021    Procedure: Washout left thigh wound and closure;  Surgeon: Amor Jaramillo DO;  [facility];  Service: General       Family History   Problem Relation Age of Onset    Heart disease Father     Diabetes Father     Hypertension Mother     Heart disease Mother      I have reviewed and agree with the history as documented.    E-Cigarette/Vaping    E-Cigarette Use Never User      E-Cigarette/Vaping Substances    Nicotine No     THC No     CBD No     Flavoring No      Social History     Tobacco Use    Smoking status: Former     Current packs/day: 0.00     Average packs/day: 1.5 packs/day for 20.0 years (29.9 ttl pk-yrs)     Types: Cigarettes     Start date: 2021     Quit date: 2021     Years since quittin.9    Smokeless tobacco: Never   Vaping Use    Vaping status: Never Used   Substance Use Topics    Alcohol use: Not Currently     Comment: rarely    Drug use: No       Review of Systems   Constitutional:  Positive for chills. Negative for fever.   HENT: Negative.  Negative for congestion and sore throat.    Eyes: Negative.    Respiratory:  Positive for cough, chest tightness and shortness of breath.    Cardiovascular:  Positive for leg swelling. Negative for chest pain.   Gastrointestinal: Negative.  Negative for abdominal pain, diarrhea, nausea and vomiting.   Genitourinary: Negative.  Negative for dysuria, flank pain and hematuria.   Musculoskeletal: Negative.  Negative for back pain and myalgias.   Skin:  Positive for color change and rash. Negative for wound.   Neurological: Negative.  Negative for dizziness and headaches.   Psychiatric/Behavioral: Negative.  Negative  for confusion and hallucinations. The patient is not nervous/anxious.    All other systems reviewed and are negative.      Physical Exam  Physical Exam  Vitals and nursing note reviewed.   Constitutional:       General: He is not in acute distress.     Appearance: He is well-developed. He is obese. He is not ill-appearing or diaphoretic.   HENT:      Head: Normocephalic and atraumatic.   Eyes:      General: No scleral icterus.     Conjunctiva/sclera: Conjunctivae normal.   Cardiovascular:      Rate and Rhythm: Normal rate and regular rhythm.      Pulses: Normal pulses.      Heart sounds: Normal heart sounds. No murmur heard.  Pulmonary:      Effort: Pulmonary effort is normal. No respiratory distress.      Breath sounds: Rhonchi present.   Abdominal:      General: Bowel sounds are normal. There is no distension.      Palpations: Abdomen is soft.      Tenderness: There is no abdominal tenderness.   Musculoskeletal:         General: Tenderness present. No deformity. Normal range of motion.      Cervical back: Normal range of motion and neck supple.      Left lower leg: Edema present.   Skin:     General: Skin is warm and dry.      Coloration: Skin is not pale.      Findings: Erythema and rash present.      Comments: Left leg with lymphedema, lower leg diffusely red, warm and tender with few scattered petechial like lesions on distal foot/toes; right lower leg with purple petechial like rash, spreading up onto the posteromedial thigh area, diffusely tender; both DP palp. - see pictures   Neurological:      General: No focal deficit present.      Mental Status: He is alert and oriented to person, place, and time.      Cranial Nerves: No cranial nerve deficit.   Psychiatric:         Mood and Affect: Mood normal.         Behavior: Behavior normal.               Vital Signs  ED Triage Vitals   Temperature Pulse Respirations Blood Pressure SpO2   02/15/24 1608 02/15/24 1608 02/15/24 1608 02/15/24 1608 02/15/24 1608   99.7  °F (37.6 °C) 95 22 157/76 94 %      Temp src Heart Rate Source Patient Position - Orthostatic VS BP Location FiO2 (%)   -- 02/15/24 1815 02/15/24 1815 02/15/24 1815 --    Monitor Lying Left arm       Pain Score       02/15/24 1608       8           Vitals:    02/15/24 1608 02/15/24 1815   BP: 157/76 166/80   Pulse: 95 85   Patient Position - Orthostatic VS:  Lying         Visual Acuity      ED Medications  Medications   cefTRIAXone (ROCEPHIN) IVPB (premix in dextrose) 1,000 mg 50 mL (1,000 mg Intravenous New Bag 2/15/24 1811)   HYDROmorphone (DILAUDID) injection 1 mg (1 mg Intravenous Given 2/15/24 1710)   ipratropium-albuterol (DUO-NEB) 0.5-2.5 mg/3 mL inhalation solution 3 mL (3 mL Nebulization Given 2/15/24 1810)   HYDROmorphone (DILAUDID) injection 0.5 mg (0.5 mg Intravenous Given 2/15/24 1822)       Diagnostic Studies  Results Reviewed       Procedure Component Value Units Date/Time    FLU/RSV/COVID - if FLU/RSV clinically relevant [[id number]]  (Abnormal) Collected: 02/15/24 1713    Lab Status: Final result Specimen: Nares from Nose Updated: 02/15/24 1801     SARS-CoV-2 Negative     INFLUENZA A PCR Negative     INFLUENZA B PCR Negative     RSV PCR Positive    Narrative:      FOR PEDIATRIC PATIENTS - copy/paste COVID Guidelines URL to browser: [email or web address]    SARS-CoV-2 assay is a Nucleic Acid Amplification assay intended for the  qualitative detection of nucleic acid from SARS-CoV-2 in nasopharyngeal  swabs. Results are for the presumptive identification of SARS-CoV-2 RNA.    Positive results are indicative of infection with SARS-CoV-2, the virus  causing COVID-19, but do not rule out bacterial infection or co-infection  with other viruses. Laboratories within the [place] and its  territories are required to report all positive results to the appropriate  public health authorities. Negative results do not preclude SARS-CoV-2  infection and  should not be used as the sole basis for treatment or other  patient management decisions. Negative results must be combined with  clinical observations, patient history, and epidemiological information.  This test has not been FDA cleared or approved.    This test has been authorized by FDA under an Emergency Use Authorization  (EUA). This test is only authorized for the duration of time the  declaration that circumstances exist justifying the authorization of the  emergency use of an in vitro diagnostic tests for detection of SARS-CoV-2  virus and/or diagnosis of COVID-19 infection under section 564(b)(1) of  the Act, 21 U.S.C. 360bbb-3(b)(1), unless the authorization is terminated  or revoked sooner. The test has been validated but independent review by FDA  and CLIA is pending.    Test performed using [employer]: This RT-PCR assay targets N2,  a region unique to SARS-CoV-2. A conserved region in the E-gene was chosen  for pan-Sarbecovirus detection which includes SARS-CoV-2.    According to CMS-2020-01-R, this platform meets the definition of high-throughput technology.    Procalcitonin [[id number]]  (Normal) Collected: 02/15/24 1713    Lab Status: Final result Specimen: Blood from Arm, Right Updated: 02/15/24 1755     Procalcitonin 0.09 ng/ml     B-Type Natriuretic Peptide(BNP) [[id number]]  (Normal) Collected: 02/15/24 1713    Lab Status: Final result Specimen: Blood from Arm, Right Updated: 02/15/24 1752     BNP 31 pg/mL     Lactic acid, plasma (w/reflex if result > 2.0) [[id number]]  (Normal) Collected: 02/15/24 1713    Lab Status: Final result Specimen: Blood from Arm, Right Updated: 02/15/24 1743     LACTIC ACID 1.0 mmol/L     Narrative:      Result may be elevated if tourniquet was used during collection.    Comprehensive metabolic panel [[id number]]  (Abnormal) Collected: 02/15/24 1713    Lab Status: Final result Specimen: Blood from Arm, Right Updated: 02/15/24 1743     Sodium 135 mmol/L       Potassium 4.3 mmol/L      Chloride 99 mmol/L      CO2 29 mmol/L      ANION GAP 7 mmol/L      BUN 27 mg/dL      Creatinine 1.51 mg/dL      Glucose 181 mg/dL      Calcium 8.9 mg/dL      Corrected Calcium 9.4 mg/dL      AST 16 U/L      ALT 14 U/L      Alkaline Phosphatase 92 U/L      Total Protein 8.3 g/dL      Albumin 3.4 g/dL      Total Bilirubin 0.28 mg/dL      eGFR 56 ml/min/1.73sq m     Narrative:      National Kidney Disease Foundation guidelines for Chronic Kidney Disease (CKD):     Stage 1 with normal or high GFR (GFR > 90 mL/min/1.73 square meters)    Stage 2 Mild CKD (GFR = 60-89 mL/min/1.73 square meters)    Stage 3A Moderate CKD (GFR = 45-59 mL/min/1.73 square meters)    Stage 3B Moderate CKD (GFR = 30-44 mL/min/1.73 square meters)    Stage 4 Severe CKD (GFR = 15-29 mL/min/1.73 square meters)    Stage 5 End Stage CKD (GFR <15 mL/min/1.73 square meters)  Note: GFR calculation is accurate only with a steady state creatinine    Protime-INR [[id number]]  (Normal) Collected: 02/15/24 1713    Lab Status: Final result Specimen: Blood from Arm, Right Updated: 02/15/24 1739     Protime 13.8 seconds      INR 1.04    APTT [[id number]]  (Normal) Collected: 02/15/24 1713    Lab Status: Final result Specimen: Blood from Arm, Right Updated: 02/15/24 1739     PTT 28 seconds     Blood culture #2 [[id number]] Collected: 02/15/24 1723    Lab Status: In process Specimen: Blood from Hand, Left Updated: 02/15/24 1735    CBC and differential [[id number]]  (Abnormal) Collected: 02/15/24 1713    Lab Status: Final result Specimen: Blood from Arm, Right Updated: 02/15/24 1722     WBC 9.22 Thousand/uL      RBC 4.33 Million/uL      Hemoglobin 11.7 g/dL      Hematocrit 36.7 %      MCV 85 fL      MCH 27.0 pg      MCHC 31.9 g/dL      RDW 13.3 %      MPV 8.7 fL      Platelets 382 Thousands/uL      nRBC 0 /100 WBCs      Neutrophils Relative 68 %      Immat GRANS % 0 %      Lymphocytes Relative 20 %      Monocytes Relative 7 %      Eosinophils  Relative 4 %      Basophils Relative 1 %      Neutrophils Absolute 6.32 Thousands/µL      Immature Grans Absolute 0.02 Thousand/uL      Lymphocytes Absolute 1.81 Thousands/µL      Monocytes Absolute 0.66 Thousand/µL      Eosinophils Absolute 0.35 Thousand/µL      Basophils Absolute 0.06 Thousands/µL     Blood culture #1 [[id number]] Collected: 02/15/24 1713    Lab Status: In process Specimen: Blood from Arm, Right Updated: 02/15/24 1720                   XR chest 1 view portable   ED Interpretation by Hamida Pringle MD (02/15 1740)   NAD, no sig. Change from previous                 Procedures  Procedures         ED Course                               SBIRT 22yo+      Flowsheet Row Most Recent Value   Initial Alcohol Screen: US AUDIT-C     1. How often do you have a drink containing alcohol? 0 Filed at: 02/15/2024 1610   2. How many drinks containing alcohol do you have on a typical day you are drinking?  0 Filed at: 02/15/2024 1610   3a. Male UNDER 65: How often do you have five or more drinks on one occasion? 0 Filed at: 02/15/2024 1610   3b. FEMALE Any Age, or MALE 65+: How often do you have 4 or more drinks on one occassion? 0 Filed at: 02/15/2024 1610   Audit-C Score 0 Filed at: 02/15/2024 1610                      Medical Decision Making  Pt. Still complaining of severe pain despite diluadid.  WBC/lactic ok, slight CLOVIS.  RSV positive which accounts for his cold symptoms.  Will give IV abx for recurrent left lower ext. Cellulitis.  I am not sure what the petechial rash on the right leg is.  Will admit for further evaluation.  Discussed with SLIM.    Amount and/or Complexity of Data Reviewed  Labs: ordered.  Radiology: ordered and independent interpretation performed.    Risk  Prescription drug management.  Decision regarding hospitalization.             Disposition  Final diagnoses:   Intractable pain   Left leg cellulitis   Petechial rash   RSV infection     Time reflects when diagnosis was documented in both  MDM as applicable and the Disposition within this note       Time User Action Codes Description Comment    2/15/2024  6:23 PM Hamida Pringle Add [R52] Intractable pain     2/15/2024  6:23 PM Hamida Pringle Add [L03.116] Left leg cellulitis     2/15/2024  6:23 PM Hamida Pringle Add [R23.3] Petechial rash     2/15/2024  6:23 PM Hamida Pringle Add [B33.8] RSV infection           ED Disposition       ED Disposition   Admit    Condition   Stable    Date/Time   u Feb 15, 2024 1825    Comment   Case was discussed with GERRI and the patient's admission status was agreed to be Admission Status: inpatient status               Follow-up Information    None         Patient's Medications   Discharge Prescriptions    No medications on file       No discharge procedures on file.    PDMP Review         Value Time User    PDMP Reviewed  Yes 1/30/2024  3:07 PM Alyssa Levy MD            ED Provider  Electronically Signed by             Hamida Pringle MD  02/15/24 [id number]

## 2024-02-16 ENCOUNTER — APPOINTMENT (INPATIENT)
Dept: RADIOLOGY | Facility: HOSPITAL | Age: 44
DRG: 872 | End: 2024-02-16
Payer: COMMERCIAL

## 2024-02-16 PROBLEM — D69.2 PURPURA (HCC): Status: ACTIVE | Noted: 2024-02-15

## 2024-02-16 LAB
ALBUMIN SERPL BCP-MCNC: 3 G/DL (ref 3.5–5)
ALP SERPL-CCNC: 97 U/L (ref 34–104)
ALT SERPL W P-5'-P-CCNC: 12 U/L (ref 7–52)
ANA SER QL IA: NEGATIVE
ANION GAP SERPL CALCULATED.3IONS-SCNC: 8 MMOL/L
AST SERPL W P-5'-P-CCNC: 13 U/L (ref 13–39)
ATRIAL RATE: 90 BPM
BASOPHILS # BLD AUTO: 0.02 THOUSANDS/ÂΜL (ref 0–0.1)
BASOPHILS NFR BLD AUTO: 0 % (ref 0–1)
BILIRUB SERPL-MCNC: 0.32 MG/DL (ref 0.2–1)
BUN SERPL-MCNC: 24 MG/DL (ref 5–25)
CALCIUM ALBUM COR SERPL-MCNC: 9 MG/DL (ref 8.3–10.1)
CALCIUM SERPL-MCNC: 8.2 MG/DL (ref 8.4–10.2)
CHLORIDE SERPL-SCNC: 99 MMOL/L (ref 96–108)
CO2 SERPL-SCNC: 26 MMOL/L (ref 21–32)
CREAT SERPL-MCNC: 1.25 MG/DL (ref 0.6–1.3)
CRP SERPL QL: 122.9 MG/L
EOSINOPHIL # BLD AUTO: 0.03 THOUSAND/ÂΜL (ref 0–0.61)
EOSINOPHIL NFR BLD AUTO: 0 % (ref 0–6)
ERYTHROCYTE [DISTWIDTH] IN BLOOD BY AUTOMATED COUNT: 13.5 % (ref 11.6–15.1)
GFR SERPL CREATININE-BSD FRML MDRD: 70 ML/MIN/1.73SQ M
GLUCOSE SERPL-MCNC: 259 MG/DL (ref 65–140)
GLUCOSE SERPL-MCNC: 315 MG/DL (ref 65–140)
GLUCOSE SERPL-MCNC: 355 MG/DL (ref 65–140)
GLUCOSE SERPL-MCNC: 355 MG/DL (ref 65–140)
GLUCOSE SERPL-MCNC: 370 MG/DL (ref 65–140)
HCT VFR BLD AUTO: 37.5 % (ref 36.5–49.3)
HGB BLD-MCNC: 11.9 G/DL (ref 12–17)
IMM GRANULOCYTES # BLD AUTO: 0.06 THOUSAND/UL (ref 0–0.2)
IMM GRANULOCYTES NFR BLD AUTO: 1 % (ref 0–2)
LDH SERPL-CCNC: 161 U/L (ref 140–271)
LYMPHOCYTES # BLD AUTO: 0.87 THOUSANDS/ÂΜL (ref 0.6–4.47)
LYMPHOCYTES NFR BLD AUTO: 10 % (ref 14–44)
MAGNESIUM SERPL-MCNC: 1.6 MG/DL (ref 1.9–2.7)
MCH RBC QN AUTO: 27.1 PG (ref 26.8–34.3)
MCHC RBC AUTO-ENTMCNC: 31.7 G/DL (ref 31.4–37.4)
MCV RBC AUTO: 85 FL (ref 82–98)
MONOCYTES # BLD AUTO: 0.11 THOUSAND/ÂΜL (ref 0.17–1.22)
MONOCYTES NFR BLD AUTO: 1 % (ref 4–12)
NEUTROPHILS # BLD AUTO: 7.67 THOUSANDS/ÂΜL (ref 1.85–7.62)
NEUTS SEG NFR BLD AUTO: 88 % (ref 43–75)
NRBC BLD AUTO-RTO: 0 /100 WBCS
P AXIS: 58 DEGREES
PLATELET # BLD AUTO: 365 THOUSANDS/UL (ref 149–390)
PMV BLD AUTO: 8.9 FL (ref 8.9–12.7)
POTASSIUM SERPL-SCNC: 4.6 MMOL/L (ref 3.5–5.3)
PR INTERVAL: 152 MS
PROT SERPL-MCNC: 8.5 G/DL (ref 6.4–8.4)
QRS AXIS: -21 DEGREES
QRSD INTERVAL: 80 MS
QT INTERVAL: 368 MS
QTC INTERVAL: 450 MS
RBC # BLD AUTO: 4.39 MILLION/UL (ref 3.88–5.62)
SODIUM SERPL-SCNC: 133 MMOL/L (ref 135–147)
T WAVE AXIS: 51 DEGREES
VENTRICULAR RATE: 90 BPM
WBC # BLD AUTO: 8.76 THOUSAND/UL (ref 4.31–10.16)

## 2024-02-16 PROCEDURE — 86037 ANCA TITER EACH ANTIBODY: CPT

## 2024-02-16 PROCEDURE — 83615 LACTATE (LD) (LDH) ENZYME: CPT

## 2024-02-16 PROCEDURE — 85025 COMPLETE CBC W/AUTO DIFF WBC: CPT

## 2024-02-16 PROCEDURE — 86038 ANTINUCLEAR ANTIBODIES: CPT

## 2024-02-16 PROCEDURE — 93010 ELECTROCARDIOGRAM REPORT: CPT | Performed by: INTERNAL MEDICINE

## 2024-02-16 PROCEDURE — 83735 ASSAY OF MAGNESIUM: CPT

## 2024-02-16 PROCEDURE — 86140 C-REACTIVE PROTEIN: CPT

## 2024-02-16 PROCEDURE — 83520 IMMUNOASSAY QUANT NOS NONAB: CPT

## 2024-02-16 PROCEDURE — 93971 EXTREMITY STUDY: CPT

## 2024-02-16 PROCEDURE — 80053 COMPREHEN METABOLIC PANEL: CPT

## 2024-02-16 PROCEDURE — 82948 REAGENT STRIP/BLOOD GLUCOSE: CPT

## 2024-02-16 PROCEDURE — 97161 PT EVAL LOW COMPLEX 20 MIN: CPT

## 2024-02-16 PROCEDURE — 93971 EXTREMITY STUDY: CPT | Performed by: SURGERY

## 2024-02-16 RX ORDER — FLUTICASONE PROPIONATE 50 MCG
1 SPRAY, SUSPENSION (ML) NASAL 2 TIMES DAILY
Status: DISCONTINUED | OUTPATIENT
Start: 2024-02-16 | End: 2024-02-17 | Stop reason: HOSPADM

## 2024-02-16 RX ORDER — MAGNESIUM SULFATE HEPTAHYDRATE 40 MG/ML
4 INJECTION, SOLUTION INTRAVENOUS ONCE
Status: COMPLETED | OUTPATIENT
Start: 2024-02-16 | End: 2024-02-16

## 2024-02-16 RX ORDER — HYDROMORPHONE HCL/PF 1 MG/ML
0.5 SYRINGE (ML) INJECTION EVERY 4 HOURS PRN
Status: DISCONTINUED | OUTPATIENT
Start: 2024-02-16 | End: 2024-02-16

## 2024-02-16 RX ORDER — HYDROMORPHONE HCL/PF 1 MG/ML
0.5 SYRINGE (ML) INJECTION EVERY 6 HOURS PRN
Status: DISCONTINUED | OUTPATIENT
Start: 2024-02-16 | End: 2024-02-16

## 2024-02-16 RX ORDER — HYDROMORPHONE HCL/PF 1 MG/ML
1 SYRINGE (ML) INJECTION EVERY 4 HOURS PRN
Status: DISCONTINUED | OUTPATIENT
Start: 2024-02-16 | End: 2024-02-17

## 2024-02-16 RX ORDER — OXYCODONE HYDROCHLORIDE 10 MG/1
10 TABLET ORAL EVERY 6 HOURS PRN
Status: DISCONTINUED | OUTPATIENT
Start: 2024-02-16 | End: 2024-02-17

## 2024-02-16 RX ORDER — LANOLIN ALCOHOL/MO/W.PET/CERES
800 CREAM (GRAM) TOPICAL ONCE
Status: COMPLETED | OUTPATIENT
Start: 2024-02-16 | End: 2024-02-16

## 2024-02-16 RX ORDER — OXYCODONE HYDROCHLORIDE 10 MG/1
10 TABLET ORAL EVERY 6 HOURS PRN
Status: DISCONTINUED | OUTPATIENT
Start: 2024-02-16 | End: 2024-02-16

## 2024-02-16 RX ORDER — INSULIN GLARGINE 100 [IU]/ML
18 INJECTION, SOLUTION SUBCUTANEOUS
Status: DISCONTINUED | OUTPATIENT
Start: 2024-02-16 | End: 2024-02-17 | Stop reason: HOSPADM

## 2024-02-16 RX ORDER — PREDNISONE 20 MG/1
40 TABLET ORAL DAILY
Status: DISCONTINUED | OUTPATIENT
Start: 2024-02-17 | End: 2024-02-17 | Stop reason: HOSPADM

## 2024-02-16 RX ORDER — MAGNESIUM SULFATE HEPTAHYDRATE 40 MG/ML
2 INJECTION, SOLUTION INTRAVENOUS ONCE
Status: DISCONTINUED | OUTPATIENT
Start: 2024-02-16 | End: 2024-02-16

## 2024-02-16 RX ADMIN — ACETAMINOPHEN 975 MG: 325 TABLET ORAL at 13:58

## 2024-02-16 RX ADMIN — ENOXAPARIN SODIUM 60 MG: 60 INJECTION SUBCUTANEOUS at 17:40

## 2024-02-16 RX ADMIN — OXYCODONE HYDROCHLORIDE 10 MG: 10 TABLET ORAL at 12:04

## 2024-02-16 RX ADMIN — HYDROMORPHONE HYDROCHLORIDE 0.2 MG: 0.2 INJECTION, SOLUTION INTRAMUSCULAR; INTRAVENOUS; SUBCUTANEOUS at 01:04

## 2024-02-16 RX ADMIN — CARVEDILOL 6.25 MG: 6.25 TABLET, FILM COATED ORAL at 15:40

## 2024-02-16 RX ADMIN — HYDROMORPHONE HYDROCHLORIDE 1 MG: 1 INJECTION, SOLUTION INTRAMUSCULAR; INTRAVENOUS; SUBCUTANEOUS at 20:34

## 2024-02-16 RX ADMIN — ATORVASTATIN CALCIUM 80 MG: 80 TABLET, FILM COATED ORAL at 15:40

## 2024-02-16 RX ADMIN — ASPIRIN 81 MG 81 MG: 81 TABLET ORAL at 08:25

## 2024-02-16 RX ADMIN — CEFAZOLIN SODIUM 2000 MG: 2 SOLUTION INTRAVENOUS at 08:25

## 2024-02-16 RX ADMIN — CARVEDILOL 6.25 MG: 6.25 TABLET, FILM COATED ORAL at 08:25

## 2024-02-16 RX ADMIN — INSULIN LISPRO 10 UNITS: 100 INJECTION, SOLUTION INTRAVENOUS; SUBCUTANEOUS at 23:13

## 2024-02-16 RX ADMIN — Medication 800 MG: at 10:15

## 2024-02-16 RX ADMIN — INSULIN GLARGINE 18 UNITS: 100 INJECTION, SOLUTION SUBCUTANEOUS at 23:04

## 2024-02-16 RX ADMIN — ACETAMINOPHEN 975 MG: 325 TABLET ORAL at 05:24

## 2024-02-16 RX ADMIN — FLUTICASONE PROPIONATE 1 SPRAY: 50 SPRAY, METERED NASAL at 17:40

## 2024-02-16 RX ADMIN — METHYLPREDNISOLONE SODIUM SUCCINATE 40 MG: 40 INJECTION, POWDER, FOR SOLUTION INTRAMUSCULAR; INTRAVENOUS at 08:24

## 2024-02-16 RX ADMIN — CEFAZOLIN SODIUM 2000 MG: 2 SOLUTION INTRAVENOUS at 15:40

## 2024-02-16 RX ADMIN — INSULIN LISPRO 8 UNITS: 100 INJECTION, SOLUTION INTRAVENOUS; SUBCUTANEOUS at 08:26

## 2024-02-16 RX ADMIN — ACETAMINOPHEN 975 MG: 325 TABLET ORAL at 23:01

## 2024-02-16 RX ADMIN — OXYCODONE HYDROCHLORIDE 10 MG: 10 TABLET ORAL at 18:11

## 2024-02-16 RX ADMIN — HYDROMORPHONE HYDROCHLORIDE 1 MG: 1 INJECTION, SOLUTION INTRAMUSCULAR; INTRAVENOUS; SUBCUTANEOUS at 14:49

## 2024-02-16 RX ADMIN — FLUTICASONE PROPIONATE 1 SPRAY: 50 SPRAY, METERED NASAL at 12:04

## 2024-02-16 RX ADMIN — HYDROMORPHONE HYDROCHLORIDE 1 MG: 1 INJECTION, SOLUTION INTRAMUSCULAR; INTRAVENOUS; SUBCUTANEOUS at 10:14

## 2024-02-16 RX ADMIN — HYDROMORPHONE HYDROCHLORIDE 0.5 MG: 1 INJECTION, SOLUTION INTRAMUSCULAR; INTRAVENOUS; SUBCUTANEOUS at 08:24

## 2024-02-16 RX ADMIN — ENOXAPARIN SODIUM 60 MG: 60 INJECTION SUBCUTANEOUS at 08:26

## 2024-02-16 RX ADMIN — MAGNESIUM SULFATE 4 G: 4 INJECTION INTRAVENOUS at 10:30

## 2024-02-16 RX ADMIN — INSULIN LISPRO 6 UNITS: 100 INJECTION, SOLUTION INTRAVENOUS; SUBCUTANEOUS at 12:04

## 2024-02-16 RX ADMIN — INSULIN LISPRO 10 UNITS: 100 INJECTION, SOLUTION INTRAVENOUS; SUBCUTANEOUS at 16:43

## 2024-02-16 RX ADMIN — CEFAZOLIN SODIUM 2000 MG: 2 SOLUTION INTRAVENOUS at 23:03

## 2024-02-16 NOTE — SEPSIS NOTE
Sepsis Note   Joaquin Frankel 43 y.o. male MRN: [id number]  Unit/Bed#: 2 [place] [address] Encounter: [id number]    Patient met sepsis criteria with tachycardia, tachypnea.  Source likely RSV URTI or persistent cellulitis of LLE. Due to persistent edema, conservative fluid resuscitation will be initiated.   Initial Sepsis Screening       Row Name 02/15/24 2221                Is the patient's history suggestive of a new or worsening infection? Yes (Proceed)  -AC        Suspected source of infection wound infection  -AC        Indicate SIRS criteria Tachycardia > 90 bpm;Tachypnea > 20 resp per min  -AC        Are two or more of the above signs & symptoms of infection both present and new to the patient? Yes (Proceed)  -AC        Assess for evidence of organ dysfunction: Are any of the below criteria present within 6 hours of suspected infection and SIRS criteria that are NOT considered to be chronic conditions? --                  User Key  (r) = Recorded By, (t) = Taken By, (c) = Cosigned By      Initials Name Provider Type    AC Alycia Walden MD Resident                      Body mass index is 60.77 kg/m².  Wt Readings from Last 1 Encounters:   02/15/24 (!) 176 kg (388 lb)     IBW (Ideal Body Weight): 66.1 kg    Ideal body weight: 66.1 kg (145 lb 11.6 oz)  Adjusted ideal body weight: 110 kg (242 lb 10.2 oz)

## 2024-02-16 NOTE — ASSESSMENT & PLAN NOTE
Patient presents with new onset right lower leg peripheral petechial-like rash that has been spreading from his calf up to his thigh.  The rash is painful and worsening.  Onset 2 days ago.  Patient began experiencing URTI sx 4 days ago.  Physical examination demonstrates right lower extremity tenderness over peripheral petechial like rash.  Rash present in clusters on right lower leg but has spread to posterior medial thigh.  Bilateral DP pulses palpable +2. Petechiae likely secondary to acute viral infection.     RSV positive.  Platelet count WNL.  CR 1.51 BUN 27  Right LLE doppler: No evidence of acute or chronic deep vein thrombosis. No evidence of superficial thrombophlebitic noted.   Chest Xray: No acute cardiopulmonary changes  ANCA:WNL      Plan:  Continue prednisone 40 mg for 3 days

## 2024-02-16 NOTE — OCCUPATIONAL THERAPY NOTE
"    Occupational Therapy Screen     Patient Name: Joaquin Frankel  Today's Date: 2/16/2024  Problem List  Principal Problem:    Sepsis ([facility])  Active Problems:    Coronary artery disease    Dyslipidemia    Essential hypertension    Type 2 diabetes mellitus with diabetic polyneuropathy, without long-term current use of insulin ([facility])    CLOVIS (acute kidney injury) ([facility])    Cellulitis of left lower extremity    Anemia    Lymphedema of both lower extremities    Sleep apnea    Petechiae    RSV (acute bronchiolitis due to respiratory syncytial virus)    Past Medical History  Past Medical History:   Diagnosis Date    Acute hypoxic respiratory failure ([facility]) 10/07/2023    Arthritis     Breathing difficulty     Cellulitis 10/07/2023    Coronary artery disease     Diabetes mellitus ([facility])     Diabetic neuropathy ([facility]) 05/28/2021    Heart disease     CAD   s/p ptca with 1 stent 2012    Hidradenitis suppurativa     groin    History of transfusion     Hyperlipidemia     Hypertension     MI (myocardial infarction) ([facility])     \" silent \" M.I. in the past    Morbid obesity with BMI of 50.0-59.9, adult ([facility])     Nicotine dependence     Obesity     Pilonidal cyst     Type 1 non-ST elevation myocardial infarction (NSTEMI) ([facility]) 11/29/2020     Past Surgical History  Past Surgical History:   Procedure Laterality Date    CARDIAC SURGERY      CORONARY ANGIOPLASTY WITH STENT PLACEMENT      INCISION AND DRAINAGE OF WOUND N/A 1/24/2017    Procedure: INCISION AND DRAINAGE (I&D) BUTTOCK, PILONIDAL CYST;  Surgeon: Simba Adhikari MD;  Location: [facility];  Service:     LEG SURGERY Left     I&D of left leg 2012    NV DEBRIDEMENT SUBCUTANEOUS TISSUE 1ST 20 SQ CM/< Left 7/6/2021    Procedure: DEBRIDEMENT WOUND (WASH OUT);  Surgeon: Sudheer Mcfarlane MD;  [facility];  Service: General    NV EXC B9 LESION MRGN XCP SK TG T/A/L >4.0 CM Left 4/6/2021    Procedure: EXCISION THIGH MASS;  Surgeon: Sudheer Mcfarlane MD;  [facility];  Service: General    NV " EXCISION H/P/P/U COMPLEX REPAIR Left 7/6/2021    Procedure: EXCISION PERINEAL ABSCESS LEFT THIGH;  Surgeon: Sudheer Mcfarlane MD;  [facility];  Service: General    WY NEGATIVE PRESSURE WOUND THERAPY DME <= 50 SQ CM Left 4/6/2021    Procedure: APPLICATION VAC DRESSING THIGH;  Surgeon: Sudheer Mcfarlane MD;  [facility];  Service: General    WOUND DEBRIDEMENT Left 4/17/2021    Procedure: DEBRIDEMENT WOUND AND DRESSING CHANGE (WASH OUT);  Surgeon: Mahin Traore DO;  [facility];  Service: General    WOUND DEBRIDEMENT Left 4/22/2021    Procedure: DEBRIDEMENT LOWER EXTREMITY (WASH OUT), left groin and thigh;  Surgeon: Sudheer Mcfarlane MD;  [facility];  Service: General    WOUND DEBRIDEMENT Left 5/26/2021    Procedure: DEBRIDEMENT LOWER EXTREMITY (WASH OUT);  Surgeon: Zak Castanon DO;  [facility];  Service: General    WOUND DEBRIDEMENT Left 5/28/2021    Procedure: Washout left thigh wound and closure;  Surgeon: Amor Jaramillo DO;  [facility];  Service: General         02/16/24 1140   OT Last Visit   OT Visit Date 02/16/24   Note Type   Note type Screen   Additional Comments OT orders received, chart review completed. spoke w/ PT loraine Romero independent and does not require OT at this time. Will screen   Licensure   [place] License Number  CHASE [place] O[employer]

## 2024-02-16 NOTE — CASE MANAGEMENT
Case Management Assessment & Discharge Planning Note    Patient name Joaquin Frankel  Location [address] [place] [address]/2 [facility] 213 MRN [id number]  : 1980 Date 2024       Current Admission Date: 2/15/2024  Current Admission Diagnosis:Sepsis (HCC)   Patient Active Problem List    Diagnosis Date Noted    Petechiae 02/15/2024    RSV (acute bronchiolitis due to respiratory syncytial virus) 02/15/2024    Sleep apnea 2024    Long term (current) use of immunomodulator 2023    Lymphedema of both lower extremities 06/15/2022    History of coronary angioplasty with insertion of stent 2022    Colitis 2022    Sepsis (HCC) 2022    Abscess of multiple sites of buttock 2022    Anemia 2022    History of hidradenitis suppurativa 2022    Nocturnal hypoxia 2021    Cellulitis of left lower extremity 2021    H/O drainage of abscess 2021    Morbid obesity with BMI of 60.0-69.9, adult ([facility]) 03/10/2021    Hyponatremia 2021    CLOVIS (acute kidney injury) ([facility]) 2021    Type 2 diabetes mellitus with diabetic polyneuropathy, without long-term current use of insulin ([facility]) 2018    Dyslipidemia 2017    Essential hypertension 2016    Coronary artery disease 2014      LOS (days): 1  Geometric Mean LOS (GMLOS) (days): 3.6  Days to GMLOS:2.7     OBJECTIVE:  PATIENT READMITTED TO HOSPITAL  Risk of Unplanned Readmission Score: 21.41     Current admission status: Inpatient    Preferred Pharmacy:   [id number] - [place], [place] - [address] [facility] [address]  [address] [address]  Phone: [phone] Fax: [phone]    [facility] [place] - [address] [facility] [address]  [address] [facility] [address]  [facility] [address]  Phone: [phone] Fax: [phone]    Primary Care Provider: Luc Casas MD    Primary Insurance: BLUE CROSS  Secondary Insurance:     ASSESSMENT:  Active Health Care  Proxies    There are no active Health Care Proxies on file.       Advance Directives  Does patient currently have a Health Care decision maker?: Yes, please see Health Care Proxy section  Primary Contact: Calr Frankel    Readmission Root Cause  30 Day Readmission: Yes  Who directed you to return to the hospital?: Self  Did you understand whom to contact if you had questions or problems?: Yes  Did you get your prescriptions before you left the hospital?: No  Reason:: Preference for own pharmacy  Were you able to get your prescriptions filled when you left the hospital?: Yes  Did you take your medications as prescribed?: Yes  Were you able to get to your follow-up appointments?: No  Reason:: Other (comment) (per pt the only appt scheduled was for wound care and he spoke with Marion when he received reminder call.  Pt talked with Marion about the wound and lack of issue with it so the appt was cancelled.)  During previous admission, was a post-acute recommendation made?: No  Patient was readmitted due to: sepsis, RSV, petechial rash, intractable pain  Action Plan: medical management    Patient Information  Admitted from:: Home  Mental Status: Alert  During Assessment patient was accompanied by: Son  Assessment information provided by:: Patient  Primary Caregiver: Self  Support Systems: Self, Son, Parent  County of Residence: [place]  What city do you live in?: [place]  Home entry access options. Select all that apply.: Stairs  Number of steps to enter home.:  (Two flights)  Do the steps have railings?: Yes  Type of Current Residence: Apartment  Floor Level: 2  Upon entering residence, is there a bedroom on the main floor (no further steps)?: No  A bedroom is located on the following floor levels of residence (select all that apply):: 3rd Floor  Upon entering residence, is there a bathroom on the main floor (no further steps)?: Yes  Number of steps to 3rd floor from main floor: One Flight  Living Arrangements:  Lives w/ Son    Activities of Daily Living Prior to Admission  Functional Status: Independent  Completes ADLs independently?: Yes  Ambulates independently?: Yes  Does patient use assisted devices?: No  Does patient currently own DME?: No  Does patient have a history of Outpatient Therapy (PT/OT)?: No  Does the patient have a history of Short-Term Rehab?: No  Does patient have a history of HHC?: No  Does patient currently have HHC?: No    Patient Information Continued  Income Source: Employed  Does patient have prescription coverage?: Yes  Does patient receive dialysis treatments?: No  Does patient have a history of substance abuse?: No  Does patient have a history of Mental Health Diagnosis?: No    Means of Transportation  Means of Transport to Appts:: Drives Self      Social Determinants of Health (SDOH)      Flowsheet Row Most Recent Value   Housing Stability    In the last 12 months, was there a time when you were not able to pay the mortgage or rent on time? N   In the last 12 months, how many places have you lived? 1   In the last 12 months, was there a time when you did not have a steady place to sleep or slept in a shelter (including now)? N   Transportation Needs    In the past 12 months, has lack of transportation kept you from medical appointments or from getting medications? no   In the past 12 months, has lack of transportation kept you from meetings, work, or from getting things needed for daily living? No   Food Insecurity    Within the past 12 months, you worried that your food would run out before you got the money to buy more. Never true   Within the past 12 months, the food you bought just didn't last and you didn't have money to get more. Never true   Utilities    In the past 12 months has the electric, gas, oil, or water company threatened to shut off services in your home? No            DISCHARGE DETAILS:    Discharge planning discussed with:: Patient  Freedom of Choice: Yes  Comments - Freedom  of Choice: SW met with pt to assess needs and discuss plans. Pt's plan is to return home with his son when discharged. No discharge needs expressed or anticipated by pt at this time. Pt did express that he is interested in establishing with a new PCP due to issues he has experienced with current practice. Per pt with current provider he never sees same practitioner and he feels his treatment plan changes all the time. Pt explained that the lack of consistency makes him feel like not going to appointments and he said he knows that is not a good thing.  Pt expressed that he would like to establish with a doctor with whom he can have more continuity of care.  SW provided pt with list of local [facility] providers and reviewed information with pt.  After reviewing information pt said he is anticipating trying to establish PCP at [facility].  SW offered to make appointment for pt however pt declined stating he will call after discharge.  SW offered ongoing support and assistance if needed.  CM contacted family/caregiver?: Yes (son was present during visit)  Were Treatment Team discharge recommendations reviewed with patient/caregiver?: Yes  Did patient/caregiver verbalize understanding of patient care needs?: Yes  Were patient/caregiver advised of the risks associated with not following Treatment Team discharge recommendations?: Yes    Contacts  Relationship to Patient:: Family (son)  Contact Method: In Person  Reason/Outcome: Continuity of Care, Discharge Planning    Requested Home Health Care         Is the patient interested in HHC at discharge?: No    DME Referral Provided  Referral made for DME?: No    Other Referral/Resources/Interventions Provided:  Interventions: PCP    Treatment Team Recommendation: Home  Discharge Destination Plan:: Home  Transport at Discharge : Family

## 2024-02-16 NOTE — UTILIZATION REVIEW
Initial Clinical Review    Admission: Date/Time/Statement:   Admission Orders (From admission, onward)       Ordered        02/15/24 1824  INPATIENT ADMISSION  Once                          Orders Placed This Encounter   Procedures    INPATIENT ADMISSION     Standing Status:   Standing     Number of Occurrences:   1     Order Specific Question:   Level of Care     Answer:   Med Surg [16]     Order Specific Question:   Estimated length of stay     Answer:   More than 2 Midnights     Order Specific Question:   Certification     Answer:   I certify that inpatient services are medically necessary for this patient for a duration of greater than two midnights. See H&P and MD Progress Notes for additional information about the patient's course of treatment.     ED Arrival Information       Expected   -    Arrival   2/15/2024 15:50    Acuity   Urgent              Means of arrival   Walk-In    Escorted by   Self    Service   Family Medicine    Admission type   Emergency              Arrival complaint   leg pain/ sob             Chief Complaint   Patient presents with    Leg Pain     Left leg reddened , warm and painful. Right leg has a purple rash.        Initial Presentation: 43 y.o. male to ED presents for cough congestion and shortness of breath x4 days and new painful RLE rash x2 days. Recent admission for LLE cellulitis. He required IV antibiotics and a course of oral antibiotics following his discharge. Pt notes that his LLE rash has improved but he still has redness, swelling and mild pain in his LLE. He has tried Mucinex and NyQuil with some relief. Notes RLE rash started 2 days ago near the calf and ankle. Rash is painful and worsening. Now spreading up his right lower extremity and involves the thigh. PMH for CAD, hypertension, T2DM, chronic lymphedema, HLD.   Admit Inpatient level of care for Sepsis, Petechiae, RSV, Cellulitis of LLE, CLOVIS, Chronic Lymphedema of BLE. tachycardia, tachypnea. Given Iv antibiotics in  ED and continue. Given 1L IVF bous and continue @ 125 ml/hr. Bld cultures pending. Start Iv Solu-medrol 40 mg daily. RLE venous Doppler study. ANCA and JOSE ROBERTO with am labs. Monitor Cbc with plt. Skin checks. Creat 1.51, baseline 1-1.2. BMP daily.         Date: 2/16   Day 2:   Progress notes; .9. Continue Iv antibiotics and Iv Steriod. Bld cultures pending. Continue to monitor Cbc and Plt counts. Skin checks. Hold losartan due to CLOVIS. Creat 1.25 today from 1.51 yesterday. Pain control.   Pt states that he is in 10/10 pain that he cannot even think straight. He was not able to sleep well due to the pain. On exam; Petechial rash on RLE. Lymphedema w/tenderness.     ED Triage Vitals   Temperature Pulse Respirations Blood Pressure SpO2   02/15/24 1608 02/15/24 1608 02/15/24 1608 02/15/24 1608 02/15/24 1608   99.7 °F (37.6 °C) 95 22 157/76 94 %      Temp Source Heart Rate Source Patient Position - Orthostatic VS BP Location FiO2 (%)   02/15/24 2011 02/15/24 1815 02/15/24 1815 02/15/24 1815 --   Oral Monitor Lying Left arm       Pain Score       02/15/24 1608       8          Wt Readings from Last 1 Encounters:   02/15/24 (!) 176 kg (388 lb)     Additional Vital Signs:   02/16/24 08:27:09 97.6 °F (36.4 °C) 91 18 155/87 110 91 % -- --   02/16/24 08:25:09 -- 94 -- 155/87 110 95 % -- --   02/16/24 08:22:49 -- 97 -- 155/87 110 86 % Abnormal  -- --   02/16/24 03:06:54 97.3 °F (36.3 °C) Abnormal  74 17 130/74 93 91 % -- --   02/16/24 00:25:27 98 °F (36.7 °C) 77 16 118/54 75 88 % Abnormal  -- --   02/15/24 20:11:46 99.1 °F (37.3 °C) 90 17 120/64 83 -- -- --   02/15/24 1830 -- 86 19 158/75 108 99 % None (Room air)      Pertinent Labs/Diagnostic Test Results:   XR chest 1 view portable   ED Interpretation by Hamida Pringle MD (02/15 1740)   NAD, no sig. Change from previous      Final Result by Serge Camacho MD (02/16 1111)      No acute cardiopulmonary disease.            Workstation performed: [id number]         VAS  VENOUS DUPLEX -LOWER LIMB UNILATERAL  (2/16) - No evidence of acute or chronic deep vein thrombosis .  No evidence of superficial thrombophlebitis noted.  Doppler evaluation shows a normal response to augmentation maneuvers.  Popliteal, posterior tibial and anterior tibial arterial Doppler waveform's are  triphasic.      LEFT LOWER LIMB LIMITED  Evaluation shows no evidence of thrombus in the common femoral vein.  Doppler evaluation shows a normal response to augmentation maneuvers.        Results from last 7 days   Lab Units 02/15/24  1713   SARS-COV-2  Negative     Results from last 7 days   Lab Units 02/16/24  0552 02/15/24  1713   WBC Thousand/uL 8.76 9.22   HEMOGLOBIN g/dL 11.9* 11.7*   HEMATOCRIT % 37.5 36.7   PLATELETS Thousands/uL 365 382   NEUTROS ABS Thousands/µL 7.67* 6.32         Results from last 7 days   Lab Units 02/16/24  0552 02/15/24  1713   SODIUM mmol/L 133* 135   POTASSIUM mmol/L 4.6 4.3   CHLORIDE mmol/L 99 99   CO2 mmol/L 26 29   ANION GAP mmol/L 8 7   BUN mg/dL 24 27*   CREATININE mg/dL 1.25 1.51*   EGFR ml/min/1.73sq m 70 56   CALCIUM mg/dL 8.2* 8.9   MAGNESIUM mg/dL 1.6*  --      Results from last 7 days   Lab Units 02/16/24  0552 02/15/24  1713   AST U/L 13 16   ALT U/L 12 14   ALK PHOS U/L 97 92   TOTAL PROTEIN g/dL 8.5* 8.3   ALBUMIN g/dL 3.0* 3.4*   TOTAL BILIRUBIN mg/dL 0.32 0.28     Results from last 7 days   Lab Units 02/16/24  1107 02/16/24  0653 02/15/24  2345   POC GLUCOSE mg/dl 355* 315* 130     Results from last 7 days   Lab Units 02/16/24  0552 02/15/24  1713   GLUCOSE RANDOM mg/dL 259* 181*       Results from last 7 days   Lab Units 02/15/24  1713   PROTIME seconds 13.8   INR  1.04   PTT seconds 28         Results from last 7 days   Lab Units 02/15/24  1713   PROCALCITONIN ng/ml 0.09     Results from last 7 days   Lab Units 02/15/24  1713   LACTIC ACID mmol/L 1.0             Results from last 7 days   Lab Units 02/15/24  1713   BNP pg/mL 31       Results from last 7 days   Lab  "Units 02/16/24  0552   CRP mg/L 122.9*                 Results from last 7 days   Lab Units 02/15/24  1713   INFLUENZA A PCR  Negative   INFLUENZA B PCR  Negative   RSV PCR  Positive*       Results from last 7 days   Lab Units 02/15/24  1723 02/15/24  1713   BLOOD CULTURE  Received in Microbiology Lab. Culture in Progress. Received in Microbiology Lab. Culture in Progress.         ED Treatment:   Medication Administration from 02/15/2024 1550 to 02/15/2024 2010         Date/Time Order Dose Route Action     02/15/2024 1710 EST HYDROmorphone (DILAUDID) injection 1 mg 1 mg Intravenous Given     02/15/2024 1811 EST cefTRIAXone (ROCEPHIN) IVPB (premix in dextrose) 1,000 mg 50 mL 1,000 mg Intravenous New Bag     02/15/2024 1810 EST ipratropium-albuterol (DUO-NEB) 0.5-2.5 mg/3 mL inhalation solution 3 mL 3 mL Nebulization Given     02/15/2024 1822 EST HYDROmorphone (DILAUDID) injection 0.5 mg 0.5 mg Intravenous Given          Past Medical History:   Diagnosis Date    Acute hypoxic respiratory failure ([facility]) 10/07/2023    Arthritis     Breathing difficulty     Cellulitis 10/07/2023    Coronary artery disease     Diabetes mellitus ([facility])     Diabetic neuropathy ([facility]) 05/28/2021    Heart disease     CAD   s/p ptca with 1 stent 2012    Hidradenitis suppurativa     groin    History of transfusion     Hyperlipidemia     Hypertension     MI (myocardial infarction) ([facility])     \" silent \" M.I. in the past    Morbid obesity with BMI of 50.0-59.9, adult ([facility])     Nicotine dependence     Obesity     Pilonidal cyst     Type 1 non-ST elevation myocardial infarction (NSTEMI) ([facility]) 11/29/2020     Present on Admission:   Lymphedema of both lower extremities   Sepsis ([facility])   Anemia   Sleep apnea   Cellulitis of left lower extremity   CLOVIS (acute kidney injury) ([facility])   Type 2 diabetes mellitus with diabetic polyneuropathy, without long-term current use of insulin ([facility])   Essential hypertension   Dyslipidemia   Coronary artery disease   " Petechiae   RSV (acute bronchiolitis due to respiratory syncytial virus)      Admitting Diagnosis: Leg pain [M79.606]  Petechial rash [R23.3]  Left leg cellulitis [L03.116]  RSV infection [B33.8]  Intractable pain [R52]  Age/Sex: 43 y.o. male    Admission Orders:  Scheduled Medications:  acetaminophen, 975 mg, Oral, Q8H SHAZIA  aspirin, 81 mg, Oral, Daily  atorvastatin, 80 mg, Oral, Daily With Dinner  carvedilol, 6.25 mg, Oral, BID With Meals  cefazolin, 2,000 mg, Intravenous, Q8H  enoxaparin, 60 mg, Subcutaneous, BID  fluticasone, 1 spray, Each Nare, BID  insulin glargine, 12 Units, Subcutaneous, HS  insulin lispro, 2-12 Units, Subcutaneous, 4x Daily (AC & HS)  magnesium sulfate, 4 g, Intravenous, Once  methylPREDNISolone sodium succinate, 40 mg, Intravenous, Daily      Continuous IV Infusions:  sodium chloride, 125 mL/hr, Intravenous, Continuous      PRN Meds:  guaiFENesin, 600 mg, Oral, Q12H PRN  HYDROmorphone, 1 mg, Intravenous, Q4H PRN  oxyCODONE, 10 mg, Oral, Q6H PRN  oxyCODONE, 5 mg, Oral, Q4H PRN  polyethylene glycol, 17 g, Oral, Daily PRN        None    Network Utilization Review Department  ATTENTION: Please call with any questions or concerns to [phone] and carefully listen to the prompts so that you are directed to the right person. All voicemails are confidential.   For Discharge needs, contact Care Management DC Support Team at [phone] opt[phone]  Send all requests for admission clinical reviews, approved or denied determinations and any other requests to dedicated fax number below belonging to the campus where the patient is receiving treatment. List of dedicated fax numbers for the Facilities:  FACILITY NAME UR FAX NUMBER   ADMISSION DENIALS (Administrative/Medical Necessity) [phone]   DISCHARGE SUPPORT TEAM (NETWORK) [phone]   PARENT CHILD HEALTH (Maternity/NICU/Pediatrics) [phone]   [facility] [phone]   [facility]  [phone]   [facility] [phone]   [facility] [phone]   [facility] [phone]   [facility] [phone]   [facility] [phone]   [facility] [phone]   [facility] [phone]   [facility] [phone]   [facility] [phone]   [facility] [phone]

## 2024-02-16 NOTE — ASSESSMENT & PLAN NOTE
Chronic, stable.  Continue home medications including Coreg 6.25 twice daily, losartan 25 mg daily.

## 2024-02-16 NOTE — ASSESSMENT & PLAN NOTE
Patient presented to ED with cough, congestion, shortness of breath for the last 4 days.  Denies fever but endorses chills and general fatigue.  RSV positive in the ED.  New onset petechial rash 2 days ago. Right lower extremity purple petechial rash present from the right lower leg spreading up to the thigh.  WBC WNL, Pro-Markie WNL    ED course: Ceftriaxone 1 g, Dilaudid 1.0 mg, 0.5 mg, DuoNeb X1  CRP elevated 122.9    CTA chest: No acute pulmonary emboli although the pulmonary arteries are suboptimally opacified. Mild diffuse bronchial wall thickening compatible with bronchitis, possibly related to RSV. Mild coronary artery calcification, greater than expected for the patient's age, with coronary stent.    Plan:  -Continue albuterol as needed for wheezing  -Continue mucinex  -Continue flonase  -See sepsis for rest of plan

## 2024-02-16 NOTE — H&P
History and Physical - [facility]  Family Medicine Residency     Patient Information: Joaquin Frankel 43 y.o. male MRN: [id number]  Unit/Bed#: [address] [place] [address] Encounter: [id number]  Admitting Physician: Alyssa Levy MD   PCP: Luc Casas MD  Date of Admission:  02/15/24     Assessment and Plan  Sepsis (HCC)  Assessment & Plan  Patient presented with tachycardia, tachypnea likely secondary to acute RSV infection versus ongoing cellulitis of left lower extremity.  Physical exam demonstrated new petechial rash on right lower extremity that began 2 days ago.  Persistent left lower extremity erythema and swelling.  Patient notes that it is diminished since admission.    WBC WNL.  LA WNL.  Pro-Markie WNL.    ED course: Rocephin 1g IV, Dilaudid 1 mg, 0.5 mg    Initiate Ancef 2 g IV every 8 hours tomorrow based on last admission treatment plan for cellulitis  Monitor CBC and fever curve  Administer 1L NS bolus  Initiate IV NS at 125/hr  Blood cultures pending    Petechiae  Assessment & Plan  Patient presents with new onset right lower leg peripheral petechial-like rash that has been spreading from his calf up to his thigh.  The rash is painful and worsening.  Onset 2 days ago.  Patient began experiencing URTI sx 4 days ago.  Physical examination demonstrates right lower extremity tenderness over peripheral petechial like rash.  Rash present in clusters on right lower leg but has spread to posterior medial thigh.  Bilateral DP pulses palpable +2. Petechiae likely secondary to acute viral infection.     RSV positive.  Platelet count WNL.  CR 1.51 BUN 27        Initiate pain regimen as follows:  Acetaminophen 975 mg every 8 hours scheduled  Oxycodone 5 mg every 4 hours for moderate pain  Dilaudid 0.2 mg IV every 4 hours for breakthrough pain  Initiate Solu-Medrol 40 mg IV daily  Right lower extremity venous Doppler study ordered  ANCA and JOSE ROBERTO with a.m. labs tomorrow to rule out alternative vasculitis  etiologies  Continue to monitor CBC with platelet count  Continue skin checks to monitor for spread of petechial rash    RSV (acute bronchiolitis due to respiratory syncytial virus)  Assessment & Plan  Patient presented to ED with cough, congestion, shortness of breath for the last 4 days.  Denies fever but endorses chills and general fatigue.  RSV positive in the ED.  New onset petechial rash 2 days ago.  Physical examination demonstrated bilateral rhonchi on lung auscultation.  Right lower extremity purple petechial rash present from the right lower leg spreading up to the thigh.  WBC WNL, Pro-Markie WNL    ED course: Ceftriaxone 1 g, Dilaudid 1.0 mg, 0.5 mg, DuoNeb X1    Continue symptomatic management  Monitor CBC and fever curve  Initiate IV fluids  Mucinex as needed    Lymphedema of both lower extremities  Assessment & Plan  Chronic lymphedema of both lower extremities.  Physical exam demonstrates bilateral lymphedema left worse than right.     Elevate legs  Hold off on compression stockings as patient currently has petechiae and cellulitis    Cellulitis of left lower extremity  Assessment & Plan  Patient with a history of lymphedema presents with left lower extremity swelling and erythema.  Recent admission for left lower extremity cellulitis from 1/25 to 1/30.  Patient was treated with Ancef for 5 days and discharged on Keflex for 10 days.  Since admission, patient notes that his left lower extremity has remained red and slightly swollen.  Patient endorses mild pain in his left lower extremity, however notes that the pain in his right lower extremity is greater.  Physical examination demonstrates left lower extremity erythema and edema.  Patient likely has persistent left lower extremity cellulitis.  WBC WNL.     ED course: Ceftriaxone 1 g, Dilaudid 1.0 mg, 0.5 mg    Restart Ancef 2 g every 8 hours  Continue to elevate left lower extremity  Continue pain control with pain regimen listed above  Continue to  monitor for spread of erythema.    CLOVIS (acute kidney injury) ([facility])  Assessment & Plan  Acute.  CR 1.51 on admission (baseline of 1-1.2).  Likely prerenal or renal CLOVIS in the setting of dehydration, sepsis, acute infection.     1L NS bolus administered  Initiate IV NS at 125ml/hr.  Monitor BMP daily  Hold nephrotoxic medications including losartan and metformin  Monitor I's and O's  Avoid hypotension    Type 2 diabetes mellitus with diabetic polyneuropathy, without long-term current use of insulin ([facility])  Assessment & Plan  Chronic, uncontrolled.  Last HbA1c 12.1 on 1/26/2024.  Home regimen includes metformin 1000 mg twice daily, glimepiride 2 mg daily, insulin Lantus 12 units at bedtime.  Patient notes that he has not been checking his blood sugars daily at home.    Continue basal insulin 12 units  Initiate sliding scale lispro ACHS  Hold glimepiride and metformin in the setting of CLOVIS  Hypoglycemic protocol  Carbohydrate controlled diet-level 2  Monitor blood sugars ACHS    Anemia  Assessment & Plan  Chronic anemia.  Hemoglobin 11.7 on admission.  Unclear baseline, approximately 8.5-10.4.    Continue to monitor CBC    Essential hypertension  Assessment & Plan  Chronic, stable.  Home medications include Coreg 6.25 twice daily, losartan 25 mg daily.    Hold losartan in setting of CLOVIS  Continue Coreg with hold parameters  Monitor BP and vitals regularly.    Coronary artery disease  Assessment & Plan  Chronic, history of cardiac catheterization in 2010 2020.  S/p 2 stent placement at [facility] in 2020.  Home medications include aspirin 81 mg daily and carvedilol 6.25 mg twice daily    Continue home aspirin and Coreg with hold parameters    Sleep apnea  Assessment & Plan  Chronic, not currently on CPAP.  Patient has previously been referred to sleep center but has not had a sleep study performed.    Monitor for desaturation events overnight  Administer O2 via nasal cannula as needed to maintain adequate oxygen  saturation    Dyslipidemia  Assessment & Plan  Chronic, stable.    Continue home atorvastatin 80 mg daily         Fluids: 1L NS, IV fluid 125 mL/h  Electrolyte repletion: Replete as needed.  Nutrition:        Diet Orders   (From admission, onward)                 Start     Ordered    02/15/24 2231  Diet Markie/CHO Controlled; Consistent Carbohydrate Diet Level 2 (5 carb servings/75 grams CHO/meal)  Diet effective now        References:    Adult Nutrition Support Algorithm    RD Therapeutic Diet Order Protocol   Question Answer Comment   Diet Type Markie/CHO Controlled    Markie/CHO Controlled Consistent Carbohydrate Diet Level 2 (5 carb servings/75 grams CHO/meal)    RD to adjust diet per protocol? Yes        02/15/24 2235                   VTE Prophylaxis: VTE Score: 6 High Risk (Score >/= 5) - Pharmacological DVT Prophylaxis Ordered: enoxaparin (Lovenox). Sequential Compression Devices Ordered.  Code Status: Level 1 - Full Code  Anticipated Length of Stay:  Patient will be admitted on an Inpatient basis with an anticipated length of stay of  more than 2 midnights.     Justification for Hospital Stay: Sepsis, RSV, Petechiae, LLE Cellulitis  Total Time for Visit, including Counseling / Coordination of Care: 60 mins. Greater than 50% of this total time spent on direct patient counseling and coordination of care.  Patient Information Sharing:Joaquin Frankel does not wish inpatient team to notify their loved ones of their condition and current plan.     Chief Complaint:     Chief Complaint   Patient presents with    Leg Pain     Left leg reddened , warm and painful. Right leg has a purple rash.        Subjective      History of Present Illness:     Joaquin Frankel is a 43 y.o. male with PMH CAD, hypertension, T2DM, chronic lymphedema, HLD, recent admission for left lower extremity cellulitis who presents with a 4-day history of cough congestion and shortness of breath.  He also presents with a 2-day history of a new,  painful right lower extremity rash.  Patient was recently admitted at the end of January for left lower extremity cellulitis.  He required IV antibiotics and a course of oral antibiotics following his discharge.  Patient notes that his left lower extremity rash has improved but he still has redness, swelling and mild pain in his left lower extremity.  Patient denies recent fever but notes chills and fatigue in the last 4 days.  He has tried Mucinex and NyQuil with some relief.  Patient notes that his right lower extremity rash began 2 days ago near the calf and ankle.  The rash is painful and worsening.  It is now spreading up his right lower extremity and involves the thigh.  Patient notes that his right lower extremity pain is greater than his left lower extremity pain.    ED summary: IV ceftriaxone 1 g, Dilaudid 1.0 mg and 0.5 mg, DuoNebs X1     Review of Systems:  Review of Systems   Constitutional:  Positive for chills and fatigue. Negative for fever.   HENT:  Positive for postnasal drip.    Eyes: Negative.  Negative for pain and visual disturbance.   Respiratory:  Positive for cough and shortness of breath.    Cardiovascular:  Negative for chest pain, palpitations and leg swelling.   Gastrointestinal:  Negative for abdominal pain, constipation, diarrhea, nausea and vomiting.   Endocrine: Negative.    Genitourinary: Negative.    Musculoskeletal:  Positive for myalgias (B/L leg pain).   Skin:  Positive for rash (RLE rash and LLE swelling and redness).   Allergic/Immunologic: Negative.    Neurological: Negative.    Hematological: Negative.    Psychiatric/Behavioral: Negative.     All other systems reviewed and are negative.       Past Medical History:   Diagnosis Date    Acute hypoxic respiratory failure (HCC) 10/07/2023    Arthritis     Breathing difficulty     Cellulitis 10/07/2023    Coronary artery disease     Diabetes mellitus ([facility])     Diabetic neuropathy ([facility]) 05/28/2021    Heart disease     CAD   s/p ptca  "with 1 stent 2012    Hidradenitis suppurativa     groin    History of transfusion     Hyperlipidemia     Hypertension     MI (myocardial infarction) ([facility])     \" silent \" M.I. in the past    Morbid obesity with BMI of 50.0-59.9, adult ([facility])     Nicotine dependence     Obesity     Pilonidal cyst     Type 1 non-ST elevation myocardial infarction (NSTEMI) ([facility]) 11/29/2020     Past Surgical History:   Procedure Laterality Date    CARDIAC SURGERY      CORONARY ANGIOPLASTY WITH STENT PLACEMENT      INCISION AND DRAINAGE OF WOUND N/A 1/24/2017    Procedure: INCISION AND DRAINAGE (I&D) BUTTOCK, PILONIDAL CYST;  Surgeon: Simba Adhikari MD;  Location: [facility];  Service:     LEG SURGERY Left     I&D of left leg 2012    ID DEBRIDEMENT SUBCUTANEOUS TISSUE 1ST 20 SQ CM/< Left 7/6/2021    Procedure: DEBRIDEMENT WOUND (WASH OUT);  Surgeon: Sudheer Mcfarlane MD;  Location: [facility];  Service: General    ID EXC B9 LESION MRGN XCP SK TG T/A/L >4.0 CM Left 4/6/2021    Procedure: EXCISION THIGH MASS;  Surgeon: Sudheer Mcfarlane MD;  Location: [facility];  Service: General    ID EXCISION H/P/P/U COMPLEX REPAIR Left 7/6/2021    Procedure: EXCISION PERINEAL ABSCESS LEFT THIGH;  Surgeon: Sudheer Mcfarlane MD;  Location: [facility];  Service: General    ID NEGATIVE PRESSURE WOUND THERAPY DME <= 50 SQ CM Left 4/6/2021    Procedure: APPLICATION VAC DRESSING THIGH;  Surgeon: Sudheer Mcfarlane MD;  [facility]: [facility];  Service: General    WOUND DEBRIDEMENT Left 4/17/2021    Procedure: DEBRIDEMENT WOUND AND DRESSING CHANGE (WASH OUT);  Surgeon: Mahin Traore DO;  [facility]: [facility];  Service: General    WOUND DEBRIDEMENT Left 4/22/2021    Procedure: DEBRIDEMENT LOWER EXTREMITY (WASH OUT), left groin and thigh;  Surgeon: Sudheer Mcfarlane MD;  [facility]: [facility];  Service: General    WOUND DEBRIDEMENT Left 5/26/2021    Procedure: DEBRIDEMENT LOWER EXTREMITY (WASH OUT);  Surgeon: Zak Castanon DO;  [facility]: [facility];  Service: General    WOUND DEBRIDEMENT Left " 2021    Procedure: Washout left thigh wound and closure;  Surgeon: Amor Jaramillo DO;  [facility]: [facility];  Service: General     Allergies   Allergen Reactions    Amoxicillin Hives     childhood     Prior to Admission Medications   Prescriptions Last Dose Informant Patient Reported? Taking?   EPINEPHrine (EPIPEN) 0.3 mg/0.3 mL SOAJ   No No   Sig: Inject 0.3 mL (0.3 mg total) into a muscle once for 1 dose   Insulin Pen Needle (BD ULTRA-FINE PEN NEEDLES) 29G X 12.7MM MISC   No No   Sig: USE 1 NEEDLE ONCE DAILY FOR INJECTION UNDER THE SKIN   aspirin (ECOTRIN LOW STRENGTH) 81 mg EC tablet   No No   Sig: Take 1 tablet (81 mg total) by mouth daily   atorvastatin (LIPITOR) 80 mg tablet   No No   Sig: Take 1 tablet (80 mg total) by mouth daily   carvedilol (COREG) 6.25 mg tablet   No No   Sig: TAKE ONE TABLET BY MOUTH TWICE A DAY WITH MEALS   glimepiride (AMARYL) 2 mg tablet   No No   Sig: Take 1 tablet (2 mg total) by mouth daily with breakfast   insulin glargine (LANTUS) 100 units/mL subcutaneous injection   No No   Sig: Inject 12 Units under the skin daily at bedtime   losartan (COZAAR) 25 mg tablet   No No   Sig: Take 1 tablet (25 mg total) by mouth daily   metFORMIN (GLUCOPHAGE) 1000 MG tablet   No No   Sig: Take 1 tablet (1,000 mg total) by mouth 2 (two) times a day with meals   zinc gluconate 50 mg tablet   No No   Sig: Take 1 tablet (50 mg total) by mouth daily      Facility-Administered Medications: None     Social History     Socioeconomic History    Marital status: Single     Spouse name: Not on file    Number of children: 1    Years of education: 12    Highest education level: High school graduate   Occupational History    Not on file   Tobacco Use    Smoking status: Former     Current packs/day: 0.00     Average packs/day: 1.5 packs/day for 20.0 years (29.9 ttl pk-yrs)     Types: Cigarettes     Start date: 2021     Quit date: 2021     Years since quittin.9    Smokeless tobacco: Never  "  Vaping Use    Vaping status: Never Used   Substance and Sexual Activity    Alcohol use: Not Currently     Comment: rarely    Drug use: No    Sexual activity: Not Currently     Partners: Female   Other Topics Concern    Not on file   Social History Narrative    Not on file     Social Determinants of Health     Financial Resource Strain: Not on file   Food Insecurity: No Food Insecurity (1/26/2024)    Hunger Vital Sign     Worried About Running Out of Food in the Last Year: Never true     Ran Out of Food in the Last Year: Never true   Transportation Needs: No Transportation Needs (1/26/2024)    PRAPARE - Transportation     Lack of Transportation (Medical): No     Lack of Transportation (Non-Medical): No   Physical Activity: Not on file   Stress: Not on file   Social Connections: Not on file   Intimate Partner Violence: Not on file   Housing Stability: Low Risk  (1/26/2024)    Housing Stability Vital Sign     Unable to Pay for Housing in the Last Year: No     Number of Places Lived in the Last Year: 1     Unstable Housing in the Last Year: No     Family History   Problem Relation Age of Onset    Heart disease Father     Diabetes Father     Hypertension Mother     Heart disease Mother         Patient Pre-hospital Living Situation: Home  Patient Pre-hospital Level of Mobility: Ambulatory  Patient Pre-hospital Diet Restrictions: None        Objective     Physical Exam:   Vitals:   Blood Pressure: 120/64 (02/15/24 2011)  Pulse: 90 (02/15/24 2011)  Temperature: 99.1 °F (37.3 °C) (02/15/24 2011)  Temp Source: Oral (02/15/24 2011)  Respirations: 17 (02/15/24 2011)  Height: 5' 7\" (170.2 cm) (02/15/24 2011)  Weight - Scale: (!) 176 kg (388 lb) (02/15/24 2011)  SpO2: 99 % (02/15/24 1830)     Physical Exam  Vitals and nursing note reviewed.   Constitutional:       Appearance: Normal appearance.   HENT:      Head: Normocephalic and atraumatic.      Mouth/Throat:      Mouth: Mucous membranes are moist.      Pharynx: Oropharynx is " clear.   Cardiovascular:      Rate and Rhythm: Normal rate and regular rhythm.      Pulses: Normal pulses.      Heart sounds: Normal heart sounds. No murmur heard.  Pulmonary:      Effort: Pulmonary effort is normal.      Breath sounds: Rhonchi (Bilateral) present. No wheezing.   Abdominal:      General: Abdomen is flat. Bowel sounds are normal. There is no distension.      Palpations: Abdomen is soft.      Tenderness: There is no abdominal tenderness.   Musculoskeletal:         General: Tenderness (Severe tenderness to palpation over right lower extremity petechial rash.  Mild tenderness palpation over left lower extremity erythema) present. Normal range of motion.      Cervical back: Normal range of motion.      Right lower leg: Edema present.      Left lower leg: Edema (Left lower extremity edema greater than right.  No pitting edema present) present.   Skin:     General: Skin is warm and dry.      Findings: Erythema (Left lower extremity erythema) and rash (Right lower extremity peripheral petechial rash present from dorsum of feet to thigh.) present. No lesion.      Comments: Few scattered petechiae present on left lower extremity.  Left lower extremity has diffuse erythema and swelling.  Mildly warm to touch.   Neurological:      General: No focal deficit present.      Mental Status: He is alert and oriented to person, place, and time.   Psychiatric:         Mood and Affect: Mood normal.         Behavior: Behavior normal.            Lab Results: I have personally reviewed pertinent reports.    Results from last 7 days   Lab Units 02/15/24  1713   WBC Thousand/uL 9.22   HEMOGLOBIN g/dL 11.7*   HEMATOCRIT % 36.7   PLATELETS Thousands/uL 382   NEUTROS PCT % 68   LYMPHS PCT % 20   MONOS PCT % 7   EOS PCT % 4     Results from last 7 days   Lab Units 02/15/24  1713   POTASSIUM mmol/L 4.3   CHLORIDE mmol/L 99   CO2 mmol/L 29   BUN mg/dL 27*   CREATININE mg/dL 1.51*   CALCIUM mg/dL 8.9   ALK PHOS U/L 92   ALT U/L 14  "  AST U/L 16   EGFR ml/min/1.73sq m 56     Results from last 7 days   Lab Units 02/15/24  1713   INR  1.04     Results from last 7 days   Lab Units 02/15/24  1713   LACTIC ACID mmol/L 1.0                    Invalid input(s): \"URIBILINOGEN\"                    Imaging: I have personally reviewed pertinent reports.    No results found.      EKG, Pathology, and Other Studies Reviewed on Admission:   EKG  Result Date: 02/15/24  Impression:  HR 90 NSR            ** Please Note: This note has been constructed using a voice recognition system **     Alycia Walden MD  02/15/24  11:36 PM    "

## 2024-02-16 NOTE — ASSESSMENT & PLAN NOTE
As evidenced by tachycardia, tachypnea likely secondary to acute RSV infection. Some evidence of LLE cellulitis. Physical exam demonstrated new petechial rash on right lower extremity that began 2 days ago.  Persistent left lower extremity erythema and swelling.      WBC WNL, procal WNL, LA WNL    ED course: Rocephin 1g IV, Dilaudid 1 mg, 0.5 mg    Ancef 2 g IV every 8 hours x 2 days     Plan:  -Keflex 4x per day for 5 days   -Prednisone 40 mg for 3 days

## 2024-02-16 NOTE — ASSESSMENT & PLAN NOTE
Chronic, uncontrolled.  Last HbA1c 12.1 on 1/26/2024.  Home regimen includes metformin 1000 mg twice daily, glimepiride 2 mg daily, insulin Lantus 12 units at bedtime.  Patient notes that he has not been checking his blood sugars daily at home.    Plan:  Continue basal insulin 18U for 3 days until completion of steroids  Return to 12U HS after  Restart home glimepiride and metformin  Carbohydrate controlled diet at home  Monitor blood sugars ACHS

## 2024-02-16 NOTE — PHYSICAL THERAPY NOTE
PT EVALUATION     02/16/24 1125   PT Last Visit   PT Visit Date 02/16/24   Note Type   Note type Evaluation   Pain Assessment   Pain Assessment Tool Riojas-Baker FACES   Riojas-Baker FACES Pain Rating 6  (B lower leg areas)   Restrictions/Precautions   Other Precautions Contact/isolation;Droplet precautions   Home Living   Type of Home Apartment   Home Layout One level  (15 Stairs to enter and 2 full flights to get to the apartment)   Home Equipment Other (Comment)  (none)   Additional Comments patient independent without assistive device prior to admission   Prior Function   Level of [place] Independent with ADLs;Independent with functional mobility;Independent with IADLS   Lives With Son  (20 year old)   IADLs Independent with driving;Independent with meal prep;Independent with medication management   Vocational Full time employment   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 R lower leg rash and edema and RSV. Patient independent and working full time prior to admission. Also with history of LLE cellulitis. patient now presents as independent with transfers, gait and ADLs and does not require skilled PT/OT services at this time. Patient ambulating independently to and from bathroom here in the hospital   Family/Caregiver Present No   Cognition   Overall Cognitive Status WFL   Arousal/Participation Cooperative   Orientation Level Oriented X4   Following Commands Follows all commands and directions without difficulty   Subjective   Subjective patient hoping to return home soon, reporting needing pain controled prior to DC   RUE Assessment   RUE Assessment WFL   LUE Assessment   LUE Assessment WFL   RLE Assessment   RLE Assessment   (rom WFL, strength 4/5)   LLE Assessment   LLE Assessment   (ROM WFL, strength 4/5)   Coordination   Movements are Fluid and Coordinated 1   Bed Mobility   Supine to Sit  7  Independent   Sit to Supine 7  Independent   Transfers   Sit to Stand 7  Independent   Stand to Sit 7  Independent   Ambulation/Elevation   Gait Assistance 7  Independent   Assistive Device   (none)   Distance unlimited distances in his hospital room (patient on isolation due to RSV)   Balance   Static Sitting Good   Dynamic Sitting Good   Static Standing Good   Dynamic Standing Good   Ambulatory Good   Activity Tolerance   Activity Tolerance Patient tolerated treatment well   Nurse Made Aware yes   Assessment   Assessment Patient seen for Physical Therapy evaluation. Patient admitted with Sepsis (HCC).  Comorbidities affecting patient's physical performance include: . Patient now presents as independent with gait, transfers and ADLs and does not require skilled PT/OT services. Personal factors affecting patient at time of initial evaluation include: lives in 3rd floor apartment story house, inability to navigate community distances, inability to navigate level surfaces without external assistance, inability to perform dynamic tasks in community, inability to perform current job functions, inability to perform caregiver support/tasks, inability to perform physical activity, and inability to perform IADLS . Prior to admission, patient was independent with functional mobility without assistive device, independent with ADLS, independent with IADLS, living in a multi-level home, ambulating household distance, ambulating community distances, and works full time.  Please find objective findings from Physical Therapy assessment regarding body systems outlined above with impairments and limitations including no new functional deficits.  The Barthel Index was used as a functional outcome tool presenting with a score of Barthel Index Score: 100 today indicating no limitations of functional mobility and ADLS.  Patient's clinical presentation is currently stable  as seen in patient's presentation of no new functional  deficits.  As demonstrated by objective findings, the assigned level of complexity for this evaluation is low.The patient's AM-PAC Basic Mobility Inpatient Short Form Raw Score is 23. A Raw score of greater than 16 suggests the patient may benefit from discharge to home. Please also refer to the recommendation of the Physical Therapist for safe discharge planning.   Goals   Patient Goals to decrease leg pain and go home   STG Expiration Date   (na)   LTG Expiration Date   (na)   Discharge Recommendation   Rehab Resource Intensity Level, PT No post-acute rehabilitation needs   AM-PAC Basic Mobility Inpatient   Turning in Flat Bed Without Bedrails 4   Lying on Back to Sitting on Edge of Flat Bed Without Bedrails 4   Moving Bed to Chair 4   Standing Up From Chair Using Arms 4   Walk in Room 4   Climb 3-5 Stairs With Railing 3   Basic Mobility Inpatient Raw Score 23   Basic Mobility Standardized Score 50.88   Highest Level Of Mobility   JH-HLM Goal 7: Walk 25 feet or more   JH-HLM Achieved 7: Walk 25 feet or more   Barthel Index   Feeding 10   Bathing 5   Grooming Score 5   Dressing Score 10   Bladder Score 10   Bowels Score 10   Toilet Use Score 10   Transfers (Bed/Chair) Score 15   Mobility (Level Surface) Score 15   Stairs Score 10   Barthel Index Score 100   Licensure   [place] License Number  Heather Miller PT [id number]

## 2024-02-16 NOTE — PLAN OF CARE
Problem: PAIN - ADULT  Goal: Verbalizes/displays adequate comfort level or baseline comfort level  Description: Interventions:  - Encourage patient to monitor pain and request assistance  - Assess pain using appropriate pain scale  - Administer analgesics based on type and severity of pain and evaluate response  - Implement non-pharmacological measures as appropriate and evaluate response  - Consider cultural and social influences on pain and pain management  - Notify physician/advanced practitioner if interventions unsuccessful or patient reports new pain  Outcome: Progressing     Problem: INFECTION - ADULT  Goal: Absence or prevention of progression during hospitalization  Description: INTERVENTIONS:  - Assess and monitor for signs and symptoms of infection  - Monitor lab/diagnostic results  - Monitor all insertion sites, i.e. indwelling lines, tubes, and drains  - Monitor endotracheal if appropriate and nasal secretions for changes in amount and color  - Bowie appropriate cooling/warming therapies per order  - Administer medications as ordered  - Instruct and encourage patient and family to use good hand hygiene technique  - Identify and instruct in appropriate isolation precautions for identified infection/condition  Outcome: Progressing     Problem: SAFETY ADULT  Goal: Patient will remain free of falls  Description: INTERVENTIONS:  - Educate patient/family on patient safety including physical limitations  - Instruct patient to call for assistance with activity   - Consult OT/PT to assist with strengthening/mobility   - Keep Call bell within reach  - Keep bed low and locked with side rails adjusted as appropriate  - Keep care items and personal belongings within reach  - Initiate and maintain comfort rounds  - Make Fall Risk Sign visible to staff  - Offer Toileting every  Hours, in advance of need  - Initiate/Maintain alarm  - Obtain necessary fall risk management equipment:   - Apply yellow socks and  bracelet for high fall risk patients  - Consider moving patient to room near nurses station  Outcome: Progressing     Problem: DISCHARGE PLANNING  Goal: Discharge to home or other facility with appropriate resources  Description: INTERVENTIONS:  - Identify barriers to discharge w/patient and caregiver  - Arrange for needed discharge resources and transportation as appropriate  - Identify discharge learning needs (meds, wound care, etc.)  - Arrange for interpretive services to assist at discharge as needed  - Refer to Case Management Department for coordinating discharge planning if the patient needs post-hospital services based on physician/advanced practitioner order or complex needs related to functional status, cognitive ability, or social support system  Outcome: Progressing     Problem: Knowledge Deficit  Goal: Patient/family/caregiver demonstrates understanding of disease process, treatment plan, medications, and discharge instructions  Description: Complete learning assessment and assess knowledge base.  Interventions:  - Provide teaching at level of understanding  - Provide teaching via preferred learning methods  Outcome: Progressing     Problem: CARDIOVASCULAR - ADULT  Goal: Maintains optimal cardiac output and hemodynamic stability  Description: INTERVENTIONS:  - Monitor I/O, vital signs and rhythm  - Monitor for S/S and trends of decreased cardiac output  - Administer and titrate ordered vasoactive medications to optimize hemodynamic stability  - Assess quality of pulses, skin color and temperature  - Assess for signs of decreased coronary artery perfusion  - Instruct patient to report change in severity of symptoms  Outcome: Progressing     Problem: RESPIRATORY - ADULT  Goal: Achieves optimal ventilation and oxygenation  Description: INTERVENTIONS:  - Assess for changes in respiratory status  - Assess for changes in mentation and behavior  - Position to facilitate oxygenation and minimize respiratory  effort  - Oxygen administered by appropriate delivery if ordered  - Initiate smoking cessation education as indicated  - Encourage broncho-pulmonary hygiene including cough, deep breathe, Incentive Spirometry  - Assess the need for suctioning and aspirate as needed  - Assess and instruct to report SOB or any respiratory difficulty  - Respiratory Therapy support as indicated  Outcome: Progressing     Problem: METABOLIC, FLUID AND ELECTROLYTES - ADULT  Goal: Electrolytes maintained within normal limits  Description: INTERVENTIONS:  - Monitor labs and assess patient for signs and symptoms of electrolyte imbalances  - Administer electrolyte replacement as ordered  - Monitor response to electrolyte replacements, including repeat lab results as appropriate  - Instruct patient on fluid and nutrition as appropriate  Outcome: Progressing     Problem: SKIN/TISSUE INTEGRITY - ADULT  Goal: Skin Integrity remains intact(Skin Breakdown Prevention)  Description: Assess:  -Perform Juan assessment every   -Clean and moisturize skin every   -Inspect skin when repositioning, toileting, and assisting with ADLS  -Assess under medical devices such as  every   -Assess extremities for adequate circulation and sensation     Bed Management:  -Have minimal linens on bed & keep smooth, unwrinkled  -Change linens as needed when moist or perspiring  -Avoid sitting or lying in one position for more than  hours while in bed  -Keep HOB at degrees     Toileting:  -Offer bedside commode  -Assess for incontinence every   -Use incontinent care products after each incontinent episode such as     Activity:  -Mobilize patient  times a day  -Encourage activity and walks on unit  -Encourage or provide ROM exercises   -Turn and reposition patient every  Hours  -Use appropriate equipment to lift or move patient in bed  -Instruct/ Assist with weight shifting every  when out of bed in chair  -Consider limitation of chair time hour intervals    Skin  Care:  -Avoid use of baby powder, tape, friction and shearing, hot water or constrictive clothing  -Relieve pressure over bony prominences using   -Do not massage red bony areas    Next Steps:  -Teach patient strategies to minimize risks such as    -Consider consults to  interdisciplinary teams such as   Outcome: Progressing

## 2024-02-16 NOTE — ASSESSMENT & PLAN NOTE
Chronic lymphedema of both lower extremities.  Physical exam demonstrates bilateral lymphedema left worse than right. Resume stockings when rash has resolved

## 2024-02-16 NOTE — PROGRESS NOTES
Daily Progress Note - [facility]  Family Medicine Residency  Joaquin Frankel 43 y.o. male MRN: [id number]  Unit/Bed#: [address] [place] [address] Encounter: [id number]  Admitting Physician: Alyssa Levy MD   PCP: Luc Casas MD  Date of Admission:  2/15/2024  4:20 PM    Assessment and Plan    * Sepsis (HCC)  Assessment & Plan  Patient presented with tachycardia, tachypnea likely secondary to acute RSV infection versus ongoing cellulitis of left lower extremity.  Physical exam demonstrated new petechial rash on right lower extremity that began 2 days ago.  Persistent left lower extremity erythema and swelling.  Patient notes that it is diminished since admission.    WBC WNL.  LA WNL.  Pro-Markie WNL.    ED course: Rocephin 1g IV, Dilaudid 1 mg, 0.5 mg    Initiate Ancef 2 g IV every 8 hours tomorrow based on last admission treatment plan for cellulitis  Monitor CBC and fever curve  Administer 1L NS bolus  Initiate IV NS at 125/hr discontinued  Blood cultures pending    Petechiae  Assessment & Plan  Patient presents with new onset right lower leg peripheral petechial-like rash that has been spreading from his calf up to his thigh.  The rash is painful and worsening.  Onset 2 days ago.  Patient began experiencing URTI sx 4 days ago.  Physical examination demonstrates right lower extremity tenderness over peripheral petechial like rash.  Rash present in clusters on right lower leg but has spread to posterior medial thigh.  Bilateral DP pulses palpable +2. Petechiae likely secondary to acute viral infection.     RSV positive.  Platelet count WNL.  CR 1.51 BUN 27  Right LLE doppler: No evidence of acute or chronic deep vein thrombosis. No evidence of superficial thrombophlebitic noted.   Chest Xray: No acute cardiopulmonary changes  ANCA:WNL      Initiate pain regimen as follows:  Acetaminophen 975 mg every 8 hours scheduled  Oxycodone 5 mg every 4 hours for moderate pain  Oxycodone 10 mg every 6 hours for Severe  pain  Dilaudid 1 mg IV every 6 hours for breakthrough pain  Initiate Solu-Medrol 40 mg IV daily transition to PO on discharge  CRP: 122.9  Continue to monitor CBC with platelet count  Continue skin checks to monitor for spread of petechial rash    CLOVIS (acute kidney injury) (HCC)  Assessment & Plan  Acute.  CR 1.51 on admission (baseline of 1-1.2).  Likely prerenal or renal CLOVIS in the setting of dehydration, sepsis, acute infection.     1L NS bolus administered   Fluid discontinued due to creatinine back to normal (1.25)  Monitor BMP daily  Hold nephrotoxic medications including losartan and metformin  Monitor I's and O's  Avoid hypotension    Type 2 diabetes mellitus with diabetic polyneuropathy, without long-term current use of insulin ([facility])  Assessment & Plan  Chronic, uncontrolled.  Last HbA1c 12.1 on 1/26/2024.  Home regimen includes metformin 1000 mg twice daily, glimepiride 2 mg daily, insulin Lantus 12 units at bedtime.  Patient notes that he has not been checking his blood sugars daily at home.    Continue basal insulin 12 units  Initiate sliding scale lispro ACHS  Hold glimepiride and metformin in the setting of CLOVIS  Hypoglycemic protocol  Carbohydrate controlled diet-level 2  Monitor blood sugars ACHS    RSV (acute bronchiolitis due to respiratory syncytial virus)  Assessment & Plan  Patient presented to ED with cough, congestion, shortness of breath for the last 4 days.  Denies fever but endorses chills and general fatigue.  RSV positive in the ED.  New onset petechial rash 2 days ago.  Physical examination demonstrated bilateral rhonchi on lung auscultation.  Right lower extremity purple petechial rash present from the right lower leg spreading up to the thigh.  WBC WNL, Pro-Markie WNL    ED course: Ceftriaxone 1 g, Dilaudid 1.0 mg, 0.5 mg, DuoNeb X1  CRP elevated 122.9    Continue symptomatic management  Monitor CBC and fever curve  Initiate IV fluids  Mucinex as needed  Flonase BIP  Incentive  spirometry  Blood cultures pending    Sleep apnea  Assessment & Plan  Chronic, not currently on CPAP.  Patient has previously been referred to sleep center but has not had a sleep study performed.    Monitor for desaturation events overnight  Administer O2 via nasal cannula as needed to maintain adequate oxygen saturation    Lymphedema of both lower extremities  Assessment & Plan  Chronic lymphedema of both lower extremities.  Physical exam demonstrates bilateral lymphedema left worse than right.     Elevate legs  Hold off on compression stockings as patient currently has petechiae and cellulitis    Anemia  Assessment & Plan  Chronic anemia.  Hemoglobin 11.7 on admission.  Unclear baseline, approximately 8.5-10.4.    Continue to monitor CBC    Cellulitis of left lower extremity  Assessment & Plan  Patient with a history of lymphedema presents with left lower extremity swelling and erythema.  Recent admission for left lower extremity cellulitis from 1/25 to 1/30.  Patient was treated with Ancef for 5 days and discharged on Keflex for 10 days.  Since admission, patient notes that his left lower extremity has remained red and slightly swollen.  Patient endorses mild pain in his left lower extremity, however notes that the pain in his right lower extremity is greater.  Physical examination demonstrates left lower extremity erythema and edema.  Patient likely has persistent left lower extremity cellulitis.  WBC WNL.     ED course: Ceftriaxone 1 g, Dilaudid 1.0 mg, 0.5 mg    Restart Ancef 2 g every 8 hours  Continue to elevate left lower extremity  Continue pain control with pain regimen listed above  Continue to monitor for spread of erythema.    Morbid obesity with BMI of 60.0-69.9, adult (HCC)  Assessment & Plan  Pt states that he has a poor diet.    Counseled patient lifestyle changes and dietary changes  DC patient with OP sleep study referral    Essential hypertension  Assessment & Plan  Chronic, stable.  Home  medications include Coreg 6.25 twice daily, losartan 25 mg daily.    Hold losartan in setting of CLOVIS  Continue Coreg with hold parameters  Monitor BP and vitals regularly.    Dyslipidemia  Assessment & Plan  Chronic, stable.    Continue home atorvastatin 80 mg daily    Coronary artery disease  Assessment & Plan  Chronic, history of cardiac catheterization in 2010.  S/p 2 stent placement at [facility] in .  Home medications include aspirin 81 mg daily and carvedilol 6.25 mg twice daily    Continue home aspirin and Coreg with hold parameters        VTE Pharmacologic Prophylaxis: VTE Score: 6 High Risk (Score >/= 5) - Pharmacological DVT Prophylaxis Ordered: enoxaparin (Lovenox). Sequential Compression Devices Ordered.    Patient Centered Rounds: I have performed bedside rounds with nursing staff today.    Discussions with Specialists or Other Care Team Provider: ROGER    Education and Discussions with Family / Patient:   Patient Information Sharing: With the consent of Joaquin Frankel , their loved ones will be notified today by inpatient team of the patient’s condition and current plan.  All questions answered.     Time Spent for Care: 30 minutes.  More than 50% of total time spent on counseling and coordination of care as described above.    Current Length of Stay: 1 day(s)    Current Patient Status: Inpatient   Certification Statement: The patient will continue to require additional inpatient hospital stay due to RLE petechiae LLE cellulitis     Discharge Plan: Home    Code Status: Level 1 - Full Code    Subjective:   Pt was seen an evaluated at bedside. Pt states that he is in 10/10 pain that he cannot even think straight. He was not able to sleep well due to the pain. Denies nausea, vomiting, diarrhea, chest pain, shortness of breathe.    Objective     Objective:   Vitals:   Temp (24hrs), Av.3 °F (36.8 °C), Min:97.3 °F (36.3 °C), Max:99.7 °F (37.6 °C)    Temp:  [97.3 °F (36.3 °C)-99.7 °F (37.6 °C)] 97.6 °F  (36.4 °C)  HR:  [74-97] 91  Resp:  [16-22] 18  BP: (118-166)/(54-87) 155/87  SpO2:  [86 %-99 %] 91 %  Body mass index is 60.77 kg/m².     Input and Output Summary (last 24 hours):       Intake/Output Summary (Last 24 hours) at 2/16/2024 1506  Last data filed at 2/15/2024 1918  Gross per 24 hour   Intake 50 ml   Output --   Net 50 ml       Physical Exam:   Physical Exam  Constitutional:       Appearance: Normal appearance. He is obese.   HENT:      Mouth/Throat:      Mouth: Mucous membranes are moist.   Eyes:      General:         Right eye: No discharge.   Cardiovascular:      Rate and Rhythm: Normal rate and regular rhythm.      Pulses: Normal pulses.      Heart sounds: Normal heart sounds.   Pulmonary:      Effort: Pulmonary effort is normal.      Breath sounds: Normal breath sounds.   Abdominal:      General: Abdomen is flat.      Palpations: Abdomen is soft.   Musculoskeletal:         General: Normal range of motion.      Left lower leg: Edema (Lymphadema w/tenderness) present.   Skin:     General: Skin is warm and dry.      Capillary Refill: Capillary refill takes less than 2 seconds.      Findings: Rash present.      Comments: Petechial rash on RLE   Neurological:      General: No focal deficit present.      Mental Status: He is alert and oriented to person, place, and time. Mental status is at baseline.   Psychiatric:         Mood and Affect: Mood normal.         Behavior: Behavior normal.         Thought Content: Thought content normal.         Judgment: Judgment normal.           Additional Data:     Labs:  Results from last 7 days   Lab Units 02/16/24  0552   WBC Thousand/uL 8.76   HEMOGLOBIN g/dL 11.9*   HEMATOCRIT % 37.5   PLATELETS Thousands/uL 365   NEUTROS PCT % 88*   LYMPHS PCT % 10*   MONOS PCT % 1*   EOS PCT % 0     Results from last 7 days   Lab Units 02/16/24  0552   POTASSIUM mmol/L 4.6   CHLORIDE mmol/L 99   CO2 mmol/L 26   BUN mg/dL 24   CREATININE mg/dL 1.25   CALCIUM mg/dL 8.2*   ALK PHOS  U/L 97   ALT U/L 12   AST U/L 13     Results from last 7 days   Lab Units 02/15/24  1713   INR  1.04     Results from last 7 days   Lab Units 02/16/24  1107 02/16/24  0653 02/15/24  2345   POC GLUCOSE mg/dl 355* 315* 130           * I Have Reviewed All Lab Data Listed Above.  * Additional Pertinent Lab Tests Reviewed: All Labs For Current Hospital Admission Reviewed    Imaging:    Imaging Reports Reviewed Today Include: all  Imaging Personally Reviewed by Myself Includes:  all    Recent Cultures (last 7 days):     Results from last 7 days   Lab Units 02/15/24  1723 02/15/24  1713   BLOOD CULTURE  Received in Microbiology Lab. Culture in Progress. Received in Microbiology Lab. Culture in Progress.       Last 24 Hours Medication List:   Current Facility-Administered Medications   Medication Dose Route Frequency Provider Last Rate    acetaminophen  975 mg Oral Q8H SHAZIA Alycia Walden MD      aspirin  81 mg Oral Daily Alycia Walden MD      atorvastatin  80 mg Oral Daily With Dinner Alycia Walden MD      carvedilol  6.25 mg Oral BID With Meals Alycia Walden MD      cefazolin  2,000 mg Intravenous Q8H Alycia Walden MD 2,000 mg (02/16/24 0825)    enoxaparin  60 mg Subcutaneous BID Alycia Walden MD      fluticasone  1 spray Each Nare BID Sonja Velazco MD      guaiFENesin  600 mg Oral Q12H PRN Alycia Walden MD      HYDROmorphone  1 mg Intravenous Q4H PRN Sonja Velazco MD      insulin glargine  12 Units Subcutaneous HS Alycia Walden MD      insulin lispro  2-12 Units Subcutaneous 4x Daily (AC & HS) Alycia Walden MD      methylPREDNISolone sodium succinate  40 mg Intravenous Daily Alycia Walden MD      oxyCODONE  10 mg Oral Q6H PRN Aidee Shukla, DO      oxyCODONE  5 mg Oral Q4H PRN Alycia Walden MD      polyethylene glycol  17 g Oral Daily PRN Alycia Walden MD               ** Please Note: Dictation voice to text  software may have been used in the creation of this document. **    Sonja Velazco MD  02/16/24  3:06 PM

## 2024-02-16 NOTE — PLAN OF CARE
Problem: PAIN - ADULT  Goal: Verbalizes/displays adequate comfort level or baseline comfort level  Description: Interventions:  - Encourage patient to monitor pain and request assistance  - Assess pain using appropriate pain scale  - Administer analgesics based on type and severity of pain and evaluate response  - Implement non-pharmacological measures as appropriate and evaluate response  - Consider cultural and social influences on pain and pain management  - Notify physician/advanced practitioner if interventions unsuccessful or patient reports new pain  Outcome: Progressing       Problem: Knowledge Deficit  Goal: Patient/family/caregiver demonstrates understanding of disease process, treatment plan, medications, and discharge instructions  Description: Complete learning assessment and assess knowledge base.  Interventions:  - Provide teaching at level of understanding  - Provide teaching via preferred learning methods  Outcome: Progressing

## 2024-02-16 NOTE — ASSESSMENT & PLAN NOTE
Pt states that he has a poor diet.    Counseled patient lifestyle changes and dietary changes  DC patient with OP sleep study referral

## 2024-02-16 NOTE — ASSESSMENT & PLAN NOTE
Chronic, history of cardiac catheterization in 2010 2020.  S/p 2 stent placement at [facility] in 2020.  Home medications include aspirin 81 mg daily and carvedilol 6.25 mg twice daily    Continue home aspirin and Coreg with hold parameters

## 2024-02-16 NOTE — ASSESSMENT & PLAN NOTE
Chronic, not currently on CPAP.  Patient has previously been referred to sleep center but has not had a sleep study performed.    Plan:  -Referral to sleep center placed

## 2024-02-16 NOTE — ASSESSMENT & PLAN NOTE
Patient with a history of lymphedema presents with left lower extremity swelling and erythema.  Recent admission for left lower extremity cellulitis from 1/25 to 1/30.  Patient was treated with Keflex s/p last admission    ED course: Ceftriaxone 1 g, Dilaudid 1.0 mg, 0.5 mg    See sepsis for plan

## 2024-02-17 ENCOUNTER — APPOINTMENT (INPATIENT)
Dept: RADIOLOGY | Facility: HOSPITAL | Age: 44
DRG: 872 | End: 2024-02-17
Payer: COMMERCIAL

## 2024-02-17 VITALS
SYSTOLIC BLOOD PRESSURE: 105 MMHG | BODY MASS INDEX: 49.44 KG/M2 | RESPIRATION RATE: 14 BRPM | TEMPERATURE: 97.4 F | DIASTOLIC BLOOD PRESSURE: 86 MMHG | HEIGHT: 67 IN | WEIGHT: 315 LBS | HEART RATE: 62 BPM | OXYGEN SATURATION: 89 %

## 2024-02-17 LAB
ALBUMIN SERPL BCP-MCNC: 2.9 G/DL (ref 3.5–5)
ALP SERPL-CCNC: 83 U/L (ref 34–104)
ALT SERPL W P-5'-P-CCNC: 8 U/L (ref 7–52)
ANION GAP SERPL CALCULATED.3IONS-SCNC: 7 MMOL/L
AST SERPL W P-5'-P-CCNC: 9 U/L (ref 13–39)
BASOPHILS # BLD AUTO: 0.02 THOUSANDS/ÂΜL (ref 0–0.1)
BASOPHILS NFR BLD AUTO: 0 % (ref 0–1)
BILIRUB SERPL-MCNC: 0.2 MG/DL (ref 0.2–1)
BUN SERPL-MCNC: 28 MG/DL (ref 5–25)
CALCIUM ALBUM COR SERPL-MCNC: 9.5 MG/DL (ref 8.3–10.1)
CALCIUM SERPL-MCNC: 8.6 MG/DL (ref 8.4–10.2)
CHLORIDE SERPL-SCNC: 100 MMOL/L (ref 96–108)
CO2 SERPL-SCNC: 26 MMOL/L (ref 21–32)
CREAT SERPL-MCNC: 1.22 MG/DL (ref 0.6–1.3)
EOSINOPHIL # BLD AUTO: 0 THOUSAND/ÂΜL (ref 0–0.61)
EOSINOPHIL NFR BLD AUTO: 0 % (ref 0–6)
ERYTHROCYTE [DISTWIDTH] IN BLOOD BY AUTOMATED COUNT: 13.3 % (ref 11.6–15.1)
GFR SERPL CREATININE-BSD FRML MDRD: 72 ML/MIN/1.73SQ M
GLUCOSE SERPL-MCNC: 224 MG/DL (ref 65–140)
GLUCOSE SERPL-MCNC: 255 MG/DL (ref 65–140)
GLUCOSE SERPL-MCNC: 283 MG/DL (ref 65–140)
HCT VFR BLD AUTO: 33.2 % (ref 36.5–49.3)
HGB BLD-MCNC: 10.6 G/DL (ref 12–17)
IMM GRANULOCYTES # BLD AUTO: 0.06 THOUSAND/UL (ref 0–0.2)
IMM GRANULOCYTES NFR BLD AUTO: 1 % (ref 0–2)
LYMPHOCYTES # BLD AUTO: 1.29 THOUSANDS/ÂΜL (ref 0.6–4.47)
LYMPHOCYTES NFR BLD AUTO: 12 % (ref 14–44)
MAGNESIUM SERPL-MCNC: 2.2 MG/DL (ref 1.9–2.7)
MCH RBC QN AUTO: 27 PG (ref 26.8–34.3)
MCHC RBC AUTO-ENTMCNC: 31.9 G/DL (ref 31.4–37.4)
MCV RBC AUTO: 85 FL (ref 82–98)
MONOCYTES # BLD AUTO: 0.73 THOUSAND/ÂΜL (ref 0.17–1.22)
MONOCYTES NFR BLD AUTO: 7 % (ref 4–12)
NEUTROPHILS # BLD AUTO: 8.88 THOUSANDS/ÂΜL (ref 1.85–7.62)
NEUTS SEG NFR BLD AUTO: 80 % (ref 43–75)
NRBC BLD AUTO-RTO: 0 /100 WBCS
PLATELET # BLD AUTO: 348 THOUSANDS/UL (ref 149–390)
PMV BLD AUTO: 9.1 FL (ref 8.9–12.7)
POTASSIUM SERPL-SCNC: 4.8 MMOL/L (ref 3.5–5.3)
PROT SERPL-MCNC: 8 G/DL (ref 6.4–8.4)
RBC # BLD AUTO: 3.92 MILLION/UL (ref 3.88–5.62)
SODIUM SERPL-SCNC: 133 MMOL/L (ref 135–147)
WBC # BLD AUTO: 10.98 THOUSAND/UL (ref 4.31–10.16)

## 2024-02-17 PROCEDURE — 82948 REAGENT STRIP/BLOOD GLUCOSE: CPT

## 2024-02-17 PROCEDURE — 80053 COMPREHEN METABOLIC PANEL: CPT

## 2024-02-17 PROCEDURE — 99239 HOSP IP/OBS DSCHRG MGMT >30: CPT | Performed by: INTERNAL MEDICINE

## 2024-02-17 PROCEDURE — 83735 ASSAY OF MAGNESIUM: CPT

## 2024-02-17 PROCEDURE — G1004 CDSM NDSC: HCPCS

## 2024-02-17 PROCEDURE — 71275 CT ANGIOGRAPHY CHEST: CPT

## 2024-02-17 PROCEDURE — 85025 COMPLETE CBC W/AUTO DIFF WBC: CPT

## 2024-02-17 RX ORDER — ALBUTEROL SULFATE 90 UG/1
2 AEROSOL, METERED RESPIRATORY (INHALATION) EVERY 4 HOURS PRN
Status: DISCONTINUED | OUTPATIENT
Start: 2024-02-17 | End: 2024-02-17 | Stop reason: HOSPADM

## 2024-02-17 RX ORDER — INSULIN GLARGINE 100 [IU]/ML
18 INJECTION, SOLUTION SUBCUTANEOUS
Qty: 0.54 ML | Refills: 0 | Status: SHIPPED | OUTPATIENT
Start: 2024-02-17 | End: 2024-02-20

## 2024-02-17 RX ORDER — GUAIFENESIN 600 MG/1
600 TABLET, EXTENDED RELEASE ORAL EVERY 12 HOURS PRN
Qty: 10 TABLET | Refills: 0 | Status: SHIPPED | OUTPATIENT
Start: 2024-02-17 | End: 2024-02-22

## 2024-02-17 RX ORDER — PREDNISONE 20 MG/1
40 TABLET ORAL DAILY
Qty: 6 TABLET | Refills: 0 | Status: SHIPPED | OUTPATIENT
Start: 2024-02-18 | End: 2024-02-21

## 2024-02-17 RX ORDER — OXYCODONE HYDROCHLORIDE 10 MG/1
10 TABLET ORAL EVERY 6 HOURS
Status: DISCONTINUED | OUTPATIENT
Start: 2024-02-17 | End: 2024-02-17 | Stop reason: HOSPADM

## 2024-02-17 RX ORDER — FLUTICASONE PROPIONATE 50 MCG
1 SPRAY, SUSPENSION (ML) NASAL 2 TIMES DAILY
Qty: 9.9 ML | Refills: 0 | Status: SHIPPED | OUTPATIENT
Start: 2024-02-17 | End: 2024-02-21

## 2024-02-17 RX ORDER — CEPHALEXIN 500 MG/1
500 CAPSULE ORAL EVERY 6 HOURS SCHEDULED
Qty: 20 CAPSULE | Refills: 0 | Status: SHIPPED | OUTPATIENT
Start: 2024-02-17 | End: 2024-02-22

## 2024-02-17 RX ORDER — OXYCODONE HYDROCHLORIDE 5 MG/1
5 TABLET ORAL EVERY 4 HOURS PRN
Status: DISCONTINUED | OUTPATIENT
Start: 2024-02-17 | End: 2024-02-17 | Stop reason: HOSPADM

## 2024-02-17 RX ORDER — ALBUTEROL SULFATE 90 UG/1
2 AEROSOL, METERED RESPIRATORY (INHALATION) EVERY 4 HOURS PRN
Qty: 6.7 G | Refills: 0 | Status: SHIPPED | OUTPATIENT
Start: 2024-02-17

## 2024-02-17 RX ORDER — INSULIN GLARGINE 100 [IU]/ML
12 INJECTION, SOLUTION SUBCUTANEOUS
Qty: 10 ML | Refills: 0 | Status: SHIPPED | OUTPATIENT
Start: 2024-02-21

## 2024-02-17 RX ORDER — OXYCODONE HYDROCHLORIDE AND ACETAMINOPHEN 5; 325 MG/1; MG/1
1 TABLET ORAL EVERY 6 HOURS PRN
Qty: 20 TABLET | Refills: 0 | Status: SHIPPED | OUTPATIENT
Start: 2024-02-17 | End: 2024-02-22

## 2024-02-17 RX ORDER — HYDROMORPHONE HCL/PF 1 MG/ML
0.5 SYRINGE (ML) INJECTION EVERY 4 HOURS PRN
Status: DISCONTINUED | OUTPATIENT
Start: 2024-02-17 | End: 2024-02-17 | Stop reason: HOSPADM

## 2024-02-17 RX ADMIN — CEFAZOLIN SODIUM 2000 MG: 2 SOLUTION INTRAVENOUS at 08:05

## 2024-02-17 RX ADMIN — IOHEXOL 100 ML: 350 INJECTION, SOLUTION INTRAVENOUS at 12:06

## 2024-02-17 RX ADMIN — OXYCODONE HYDROCHLORIDE 5 MG: 5 TABLET ORAL at 08:54

## 2024-02-17 RX ADMIN — ASPIRIN 81 MG 81 MG: 81 TABLET ORAL at 08:04

## 2024-02-17 RX ADMIN — ACETAMINOPHEN 975 MG: 325 TABLET ORAL at 05:09

## 2024-02-17 RX ADMIN — OXYCODONE HYDROCHLORIDE 10 MG: 10 TABLET ORAL at 00:22

## 2024-02-17 RX ADMIN — ACETAMINOPHEN 975 MG: 325 TABLET ORAL at 13:31

## 2024-02-17 RX ADMIN — INSULIN LISPRO 6 UNITS: 100 INJECTION, SOLUTION INTRAVENOUS; SUBCUTANEOUS at 07:58

## 2024-02-17 RX ADMIN — HYDROMORPHONE HYDROCHLORIDE 1 MG: 1 INJECTION, SOLUTION INTRAMUSCULAR; INTRAVENOUS; SUBCUTANEOUS at 01:56

## 2024-02-17 RX ADMIN — INSULIN LISPRO 4 UNITS: 100 INJECTION, SOLUTION INTRAVENOUS; SUBCUTANEOUS at 12:22

## 2024-02-17 RX ADMIN — FLUTICASONE PROPIONATE 1 SPRAY: 50 SPRAY, METERED NASAL at 08:05

## 2024-02-17 RX ADMIN — HYDROMORPHONE HYDROCHLORIDE 1 MG: 1 INJECTION, SOLUTION INTRAMUSCULAR; INTRAVENOUS; SUBCUTANEOUS at 06:16

## 2024-02-17 RX ADMIN — PREDNISONE 40 MG: 20 TABLET ORAL at 08:04

## 2024-02-17 RX ADMIN — OXYCODONE HYDROCHLORIDE 5 MG: 5 TABLET ORAL at 13:31

## 2024-02-17 RX ADMIN — ENOXAPARIN SODIUM 60 MG: 60 INJECTION SUBCUTANEOUS at 08:04

## 2024-02-17 NOTE — PLAN OF CARE
Problem: PAIN - ADULT  Goal: Verbalizes/displays adequate comfort level or baseline comfort level  Description: Interventions:  - Encourage patient to monitor pain and request assistance  - Assess pain using appropriate pain scale  - Administer analgesics based on type and severity of pain and evaluate response  - Implement non-pharmacological measures as appropriate and evaluate response  - Consider cultural and social influences on pain and pain management  - Notify physician/advanced practitioner if interventions unsuccessful or patient reports new pain  Outcome: Progressing     Problem: INFECTION - ADULT  Goal: Absence or prevention of progression during hospitalization  Description: INTERVENTIONS:  - Assess and monitor for signs and symptoms of infection  - Monitor lab/diagnostic results  - Monitor all insertion sites, i.e. indwelling lines, tubes, and drains  - Monitor endotracheal if appropriate and nasal secretions for changes in amount and color  - [place] appropriate cooling/warming therapies per order  - Administer medications as ordered  - Instruct and encourage patient and family to use good hand hygiene technique  - Identify and instruct in appropriate isolation precautions for identified infection/condition  Outcome: Progressing     Problem: SAFETY ADULT  Goal: Patient will remain free of falls  Description: INTERVENTIONS:  - Educate patient/family on patient safety including physical limitations  - Instruct patient to call for assistance with activity   - Consult OT/PT to assist with strengthening/mobility   - Keep Call bell within reach  - Keep bed low and locked with side rails adjusted as appropriate  - Keep care items and personal belongings within reach  - Initiate and maintain comfort rounds  - Make Fall Risk Sign visible to staff  - Offer Toileting every 2 Hours, in advance of need  - Initiate/Maintain bed alarm  - Apply yellow socks and bracelet for high fall risk patients  - Consider  moving patient to room near nurses station  Outcome: Progressing     Problem: DISCHARGE PLANNING  Goal: Discharge to home or other facility with appropriate resources  Description: INTERVENTIONS:  - Identify barriers to discharge w/patient and caregiver  - Arrange for needed discharge resources and transportation as appropriate  - Identify discharge learning needs (meds, wound care, etc.)  - Arrange for interpretive services to assist at discharge as needed  - Refer to Case Management Department for coordinating discharge planning if the patient needs post-hospital services based on physician/advanced practitioner order or complex needs related to functional status, cognitive ability, or social support system  Outcome: Progressing     Problem: Knowledge Deficit  Goal: Patient/family/caregiver demonstrates understanding of disease process, treatment plan, medications, and discharge instructions  Description: Complete learning assessment and assess knowledge base.  Interventions:  - Provide teaching at level of understanding  - Provide teaching via preferred learning methods  Outcome: Progressing     Problem: RESPIRATORY - ADULT  Goal: Achieves optimal ventilation and oxygenation  Description: INTERVENTIONS:  - Assess for changes in respiratory status  - Assess for changes in mentation and behavior  - Position to facilitate oxygenation and minimize respiratory effort  - Oxygen administered by appropriate delivery if ordered  - Initiate smoking cessation education as indicated  - Encourage broncho-pulmonary hygiene including cough, deep breathe, Incentive Spirometry  - Assess the need for suctioning and aspirate as needed  - Assess and instruct to report SOB or any respiratory difficulty  - Respiratory Therapy support as indicated  Outcome: Progressing     Problem: SKIN/TISSUE INTEGRITY - ADULT  Goal: Skin Integrity remains intact(Skin Breakdown Prevention)  Description: Assess:  -Perform Juan assessment every    -Clean and moisturize skin every   -Inspect skin when repositioning, toileting, and assisting with ADLS  -Assess under medical devices such as  every   -Assess extremities for adequate circulation and sensation     Bed Management:  -Have minimal linens on bed & keep smooth, unwrinkled  -Change linens as needed when moist or perspiring  -Avoid sitting or lying in one position for more than  hours while in bed  -Keep HOB at degrees     Toileting:  -Offer bedside commode  -Assess for incontinence every   -Use incontinent care products after each incontinent episode such as     Activity:  -Mobilize patient times a day  -Encourage activity and walks on unit  -Encourage or provide ROM exercises   -Turn and reposition patient every  Hours  -Use appropriate equipment to lift or move patient in bed  -Instruct/ Assist with weight shifting every  when out of bed in chair  -Consider limitation of chair time  hour intervals    Skin Care:  -Avoid use of baby powder, tape, friction and shearing, hot water or constrictive clothing  -Relieve pressure over bony prominences using   -Do not massage red bony areas    Next Steps:  -Teach patient strategies to minimize risks such as    -Consider consults to  interdisciplinary teams such as   Outcome: Progressing

## 2024-02-17 NOTE — DISCHARGE INSTR - AVS FIRST PAGE
-Please take the Keflex 500 mg 4 times per day for 5 days   -Continue prednisone 300 mg for 3 days   -Please continue the albuterol inhaler as needed when you are wheezing

## 2024-02-17 NOTE — PLAN OF CARE
Problem: PAIN - ADULT  Goal: Verbalizes/displays adequate comfort level or baseline comfort level  Description: Interventions:  - Encourage patient to monitor pain and request assistance  - Assess pain using appropriate pain scale  - Administer analgesics based on type and severity of pain and evaluate response  - Implement non-pharmacological measures as appropriate and evaluate response  - Consider cultural and social influences on pain and pain management  - Notify physician/advanced practitioner if interventions unsuccessful or patient reports new pain  2/17/2024 1433 by Sophia Arriaga RN  Outcome: Completed  2/17/2024 1203 by Sophia Arriaga RN  Outcome: Progressing     Problem: INFECTION - ADULT  Goal: Absence or prevention of progression during hospitalization  Description: INTERVENTIONS:  - Assess and monitor for signs and symptoms of infection  - Monitor lab/diagnostic results  - Monitor all insertion sites, i.e. indwelling lines, tubes, and drains  - Monitor endotracheal if appropriate and nasal secretions for changes in amount and color  - [place] appropriate cooling/warming therapies per order  - Administer medications as ordered  - Instruct and encourage patient and family to use good hand hygiene technique  - Identify and instruct in appropriate isolation precautions for identified infection/condition  2/17/2024 1433 by Sophia Arriaga RN  Outcome: Completed  2/17/2024 1203 by Sophia Arriaga RN  Outcome: Progressing     Problem: SAFETY ADULT  Goal: Patient will remain free of falls  Description: INTERVENTIONS:  - Educate patient/family on patient safety including physical limitations  - Instruct patient to call for assistance with activity   - Consult OT/PT to assist with strengthening/mobility   - Keep Call bell within reach  - Keep bed low and locked with side rails adjusted as appropriate  - Keep care items and personal belongings within reach  - Initiate and maintain  comfort rounds  - Make Fall Risk Sign visible to staff  - Offer Toileting every 2 Hours, in advance of need  - Initiate/Maintain bed alarm  - Apply yellow socks and bracelet for high fall risk patients  - Consider moving patient to room near nurses station  2/17/2024 1433 by Sophia Arriaga RN  Outcome: Completed  2/17/2024 1203 by Sophia Arriaga RN  Outcome: Progressing     Problem: DISCHARGE PLANNING  Goal: Discharge to home or other facility with appropriate resources  Description: INTERVENTIONS:  - Identify barriers to discharge w/patient and caregiver  - Arrange for needed discharge resources and transportation as appropriate  - Identify discharge learning needs (meds, wound care, etc.)  - Arrange for interpretive services to assist at discharge as needed  - Refer to Case Management Department for coordinating discharge planning if the patient needs post-hospital services based on physician/advanced practitioner order or complex needs related to functional status, cognitive ability, or social support system  2/17/2024 1433 by Sophia Arriaga RN  Outcome: Completed  2/17/2024 1203 by Sophia Arriaga RN  Outcome: Progressing     Problem: Knowledge Deficit  Goal: Patient/family/caregiver demonstrates understanding of disease process, treatment plan, medications, and discharge instructions  Description: Complete learning assessment and assess knowledge base.  Interventions:  - Provide teaching at level of understanding  - Provide teaching via preferred learning methods  2/17/2024 1433 by Sophia Arriaga RN  Outcome: Completed  2/17/2024 1203 by Sophia Arriaga RN  Outcome: Progressing     Problem: RESPIRATORY - ADULT  Goal: Achieves optimal ventilation and oxygenation  Description: INTERVENTIONS:  - Assess for changes in respiratory status  - Assess for changes in mentation and behavior  - Position to facilitate oxygenation and minimize respiratory effort  - Oxygen administered by  appropriate delivery if ordered  - Initiate smoking cessation education as indicated  - Encourage broncho-pulmonary hygiene including cough, deep breathe, Incentive Spirometry  - Assess the need for suctioning and aspirate as needed  - Assess and instruct to report SOB or any respiratory difficulty  - Respiratory Therapy support as indicated  2/17/2024 1433 by Sophia Arriaga RN  Outcome: Completed  2/17/2024 1203 by Sophia Arriaga RN  Outcome: Progressing     Problem: SKIN/TISSUE INTEGRITY - ADULT  Goal: Skin Integrity remains intact(Skin Breakdown Prevention)  Description: Assess:  -Perform Juan assessment every   -Clean and moisturize skin every   -Inspect skin when repositioning, toileting, and assisting with ADLS  -Assess under medical devices such as  every   -Assess extremities for adequate circulation and sensation     Bed Management:  -Have minimal linens on bed & keep smooth, unwrinkled  -Change linens as needed when moist or perspiring  -Avoid sitting or lying in one position for more than  hours while in bed  -Keep HOB at degrees     Toileting:  -Offer bedside commode  -Assess for incontinence every   -Use incontinent care products after each incontinent episode such as     Activity:  -Mobilize patient times a day  -Encourage activity and walks on unit  -Encourage or provide ROM exercises   -Turn and reposition patient every  Hours  -Use appropriate equipment to lift or move patient in bed  -Instruct/ Assist with weight shifting every  when out of bed in chair  -Consider limitation of chair time  hour intervals    Skin Care:  -Avoid use of baby powder, tape, friction and shearing, hot water or constrictive clothing  -Relieve pressure over bony prominences using   -Do not massage red bony areas    Next Steps:  -Teach patient strategies to minimize risks such as    -Consider consults to  interdisciplinary teams such as   2/17/2024 1433 by Sophia Arriaga RN  Outcome: Completed  2/17/2024  1203 by Sophia Arriaga, RN  Outcome: Progressing

## 2024-02-17 NOTE — PLAN OF CARE
Problem: PAIN - ADULT  Goal: Verbalizes/displays adequate comfort level or baseline comfort level  Description: Interventions:  - Encourage patient to monitor pain and request assistance  - Assess pain using appropriate pain scale  - Administer analgesics based on type and severity of pain and evaluate response  - Implement non-pharmacological measures as appropriate and evaluate response  - Consider cultural and social influences on pain and pain management  - Notify physician/advanced practitioner if interventions unsuccessful or patient reports new pain  2/17/2024 0954 by Dorie Patricio RN  Outcome: Progressing  2/17/2024 0949 by Dorie Patricio RN  Outcome: Progressing     Problem: INFECTION - ADULT  Goal: Absence or prevention of progression during hospitalization  Description: INTERVENTIONS:  - Assess and monitor for signs and symptoms of infection  - Monitor lab/diagnostic results  - Monitor all insertion sites, i.e. indwelling lines, tubes, and drains  - Monitor endotracheal if appropriate and nasal secretions for changes in amount and color  - [place] appropriate cooling/warming therapies per order  - Administer medications as ordered  - Instruct and encourage patient and family to use good hand hygiene technique  - Identify and instruct in appropriate isolation precautions for identified infection/condition  2/17/2024 0954 by Dorie Patricio RN  Outcome: Progressing  2/17/2024 0949 by Dorie Patricio RN  Outcome: Progressing     Problem: SAFETY ADULT  Goal: Patient will remain free of falls  Description: INTERVENTIONS:  - Educate patient/family on patient safety including physical limitations  - Instruct patient to call for assistance with activity   - Consult OT/PT to assist with strengthening/mobility   - Keep Call bell within reach  - Keep bed low and locked with side rails adjusted as appropriate  - Keep care items and personal belongings within reach  -  Initiate and maintain comfort rounds  - Make Fall Risk Sign visible to staff  - Offer Toileting every 2 Hours, in advance of need  - Initiate/Maintain bed alarm  - Apply yellow socks and bracelet for high fall risk patients  - Consider moving patient to room near nurses station  2/17/2024 0954 by Dorie Patricio RN  Outcome: Progressing  2/17/2024 0949 by Dorie Patricio RN  Outcome: Progressing     Problem: DISCHARGE PLANNING  Goal: Discharge to home or other facility with appropriate resources  Description: INTERVENTIONS:  - Identify barriers to discharge w/patient and caregiver  - Arrange for needed discharge resources and transportation as appropriate  - Identify discharge learning needs (meds, wound care, etc.)  - Arrange for interpretive services to assist at discharge as needed  - Refer to Case Management Department for coordinating discharge planning if the patient needs post-hospital services based on physician/advanced practitioner order or complex needs related to functional status, cognitive ability, or social support system  2/17/2024 0954 by Dorie Patricio RN  Outcome: Progressing  2/17/2024 0949 by Dorie Patricio RN  Outcome: Progressing     Problem: Knowledge Deficit  Goal: Patient/family/caregiver demonstrates understanding of disease process, treatment plan, medications, and discharge instructions  Description: Complete learning assessment and assess knowledge base.  Interventions:  - Provide teaching at level of understanding  - Provide teaching via preferred learning methods  Outcome: Progressing     Problem: RESPIRATORY - ADULT  Goal: Achieves optimal ventilation and oxygenation  Description: INTERVENTIONS:  - Assess for changes in respiratory status  - Assess for changes in mentation and behavior  - Position to facilitate oxygenation and minimize respiratory effort  - Oxygen administered by appropriate delivery if ordered  - Initiate smoking cessation  education as indicated  - Encourage broncho-pulmonary hygiene including cough, deep breathe, Incentive Spirometry  - Assess the need for suctioning and aspirate as needed  - Assess and instruct to report SOB or any respiratory difficulty  - Respiratory Therapy support as indicated  Outcome: Progressing

## 2024-02-17 NOTE — DISCHARGE SUMMARY
Discharge Summary - [facility]     Patient Information: Joaquin Frankel 43 y.o. male MRN: [id number]  Unit/Bed#: [address] [place] [address] Encounter: [id number]     Admitting Physician: Aylssa Levy MD  Discharging Physician/Practitioner: Alyce Carty MD   PCP: Luc Casas MD  Admission Date:   Admission Orders (From admission, onward)       Ordered        02/15/24 1824  INPATIENT ADMISSION  Once                          Discharge Date: 02/17/24      Reason for Admission: Leg pain [M79.606]  Petechial rash [R23.3]  Left leg cellulitis [L03.116]  RSV infection [B33.8]  Intractable pain [R52]     Discharge Diagnoses:      Principal Problem:    Sepsis (HCC)  Active Problems:    Coronary artery disease    Dyslipidemia    Essential hypertension    Type 2 diabetes mellitus with diabetic polyneuropathy, without long-term current use of insulin (HCC)    CLOVIS (acute kidney injury) (HCC)    Morbid obesity with BMI of 60.0-69.9, adult (HCC)    Cellulitis of left lower extremity    Anemia    Lymphedema of both lower extremities    Sleep apnea    Purpura (HCC)    RSV (acute bronchiolitis due to respiratory syncytial virus)  Resolved Problems:    * No resolved hospital problems. *     Consultations During Hospital Stay:  ·       None     Procedures Performed:   ·       None     Significant Findings / Test Results:   -2/17  WBC 10.98, platelets WNL, creatinine 1.51    CTA PE: No acute pulmonary emboli although the pulmonary arteries are suboptimally opacified. Mild diffuse bronchial wall thickening compatible with bronchitis, possibly related to RSV. Mild coronary artery calcification, greater than expected for the patient's age, with coronary stent.    -2/16  WBC WNL, platelets WNL, creatinine 1.25 (baseline), magnesium 1.6, JOSE ROBERTO screen WNL, LDH WNL, .9    RLE VAS: No evidence of DVT, no evidence of superficial thrombophlebitis  LLE VAS: No evidence of DVT in common femoral  vein    -2/15  WBC WNL, platelets WNL, creatinine 1.25 (baseline), magnesium WNL, PT/PTT/INR WNL, BNP WNL, Pro-[place]ak[place] WNL, lactic acid WNL    RSV positive, COVID/flu negative  Blood cultures negative at 24 hours  CXR no acute cardiopulmonary disease    Incidental Findings:   ·       None      Test Results Pending at Discharge (will require follow up):   -Anti-neutrophilic cytoplasmic antibody  -Blood cultures      Outpatient Tests Requested:  ·       Sleep Study for ANGY eval     Outpatient follow-up Requested:  ·       Sleep medicine/Pulm             Complications:  None    Things to address at first visit after hospitalization   Have you restarted your Glimepiride and Metformin?  Are you taking your insulin Lantus at night?  Have you restarted your home losartan?  Have you completed your Keflex?  How is your rash/pain?    HPI from admission:  Joaquin Frankel is a 43 y.o. male with PMH CAD, hypertension, T2DM, chronic lymphedema, HLD, recent admission for left lower extremity cellulitis who presents with a 4-day history of cough congestion and shortness of breath.  He also presents with a 2-day history of a new, painful right lower extremity rash.  Patient was recently admitted at the end of January for left lower extremity cellulitis.  He required IV antibiotics and a course of oral antibiotics following his discharge.  Patient notes that his left lower extremity rash has improved but he still has redness, swelling and mild pain in his left lower extremity.  Patient denies recent fever but notes chills and fatigue in the last 4 days.  He has tried Mucinex and NyQuil with some relief.  Patient notes that his right lower extremity rash began 2 days ago near the calf and ankle.  The rash is painful and worsening.  It is now spreading up his right lower extremity and involves the thigh.  Patient notes that his right lower extremity pain is greater than his left lower extremity pain.     ED summary: IV ceftriaxone 1 g,  Dilaudid 1.0 mg and 0.5 mg, DuoNebs X1    Hospital Course:      Joaquin Frankel is a 43 y.o. male patient with PMH HTN, T2DM, lymphedema, HLD, recent admission for LLE cellulitis who originally presented to the hospital on 2/15/2024 due to 4 days of URTI symptoms and a 2-day history of a new onset right lower extremity rash.  Patient was diagnosed with RSV in the ED.  Physical examination at that time demonstrated  painful purpura of the right lower extremity extending up to mid thigh.  Purpura likely secondary to his viral infection.  Patient received IV steroids while hospitalized and was transitioned to p.o. prednisone on discharge.  Patient had extensive pain over the rash during his admission and received a combination of Tylenol, oxycodone, Dilaudid for pain control.  Patient was recently admitted to the hospital from 1/25 to 1/30 for left lower extremity cellulitis.  He completed both IV and p.o. antibiotics.  Patient had persistent left lower extremity erythema and edema.  He received Ancef while hospitalized and will be discharged home with a course of Keflex.   CLOVIS was present on admission and resolved with  IV fluid resuscitation.  Nephrotoxic medications were held and can be restarted upon discharge.  Patient was discharged home with instructions to restart his oral diabetes medications and antihypertensives.  He was instructed to complete a course of Keflex for his left lower extremity cellulitis.  He was advised to follow-up with his PCP for continued evaluation of his right lower extremity rash but was advised that the rash may persist for a few days following discharge.    * Sepsis (HCC)  Assessment & Plan  As evidenced by tachycardia, tachypnea likely secondary to acute RSV infection. Some evidence of LLE cellulitis. Physical exam demonstrated new petechial rash on right lower extremity that began 2 days ago.  Persistent left lower extremity erythema and swelling.      WBC WNL, procal WNL, LA  WNL    ED course: Rocephin 1g IV, Dilaudid 1 mg, 0.5 mg    Ancef 2 g IV every 8 hours x 2 days     Plan:  -Keflex 4x per day for 5 days   -Prednisone 40 mg for 3 days    RSV (acute bronchiolitis due to respiratory syncytial virus)  Assessment & Plan  Patient presented to ED with cough, congestion, shortness of breath for the last 4 days.  Denies fever but endorses chills and general fatigue.  RSV positive in the ED.  New onset petechial rash 2 days ago. Right lower extremity purple petechial rash present from the right lower leg spreading up to the thigh.  WBC WNL, Pro-Markie WNL    ED course: Ceftriaxone 1 g, Dilaudid 1.0 mg, 0.5 mg, DuoNeb X1  CRP elevated 122.9    CTA chest: No acute pulmonary emboli although the pulmonary arteries are suboptimally opacified. Mild diffuse bronchial wall thickening compatible with bronchitis, possibly related to RSV. Mild coronary artery calcification, greater than expected for the patient's age, with coronary stent.    Plan:  -Continue albuterol as needed for wheezing  -Continue mucinex  -Continue flonase  -See sepsis for rest of plan    Purpura (HCC)  Assessment & Plan  Patient presents with new onset right lower leg peripheral petechial-like rash that has been spreading from his calf up to his thigh.  The rash is painful and worsening.  Onset 2 days ago.  Patient began experiencing URTI sx 4 days ago.  Physical examination demonstrates right lower extremity tenderness over peripheral petechial like rash.  Rash present in clusters on right lower leg but has spread to posterior medial thigh.  Bilateral DP pulses palpable +2. Petechiae likely secondary to acute viral infection.     RSV positive.  Platelet count WNL.  CR 1.51 BUN 27  Right LLE doppler: No evidence of acute or chronic deep vein thrombosis. No evidence of superficial thrombophlebitic noted.   Chest Xray: No acute cardiopulmonary changes  ANCA:WNL      Plan:  Continue prednisone 40 mg for 3 days    Sleep  apnea  Assessment & Plan  Chronic, not currently on CPAP.  Patient has previously been referred to sleep center but has not had a sleep study performed.    Plan:  -Referral to sleep center placed    Lymphedema of both lower extremities  Assessment & Plan  Chronic lymphedema of both lower extremities.  Physical exam demonstrates bilateral lymphedema left worse than right. Resume stockings when rash has resolved    Anemia  Assessment & Plan  Chronic, stable.     Cellulitis of left lower extremity  Assessment & Plan  Patient with a history of lymphedema presents with left lower extremity swelling and erythema.  Recent admission for left lower extremity cellulitis from 1/25 to 1/30.  Patient was treated with Keflex s/p last admission    ED course: Ceftriaxone 1 g, Dilaudid 1.0 mg, 0.5 mg    See sepsis for plan    Morbid obesity with BMI of 60.0-69.9, adult ([facility])  Assessment & Plan  Pt states that he has a poor diet.    Counseled patient lifestyle changes and dietary changes  DC patient with OP sleep study referral    CLOVIS (acute kidney injury) ([facility])  Assessment & Plan  RESOLVED.  CR 1.51 on admission (baseline of 1-1.2).     1L NSS bolus given    Type 2 diabetes mellitus with diabetic polyneuropathy, without long-term current use of insulin ([facility])  Assessment & Plan  Chronic, uncontrolled.  Last HbA1c 12.1 on 1/26/2024.  Home regimen includes metformin 1000 mg twice daily, glimepiride 2 mg daily, insulin Lantus 12 units at bedtime.  Patient notes that he has not been checking his blood sugars daily at home.    Plan:  Continue basal insulin 18U for 3 days until completion of steroids  Return to 12U HS after  Restart home glimepiride and metformin  Carbohydrate controlled diet at home  Monitor blood sugars ACHS    Essential hypertension  Assessment & Plan  Chronic, stable.  Continue home medications including Coreg 6.25 twice daily, losartan 25 mg daily.    Dyslipidemia  Assessment & Plan  Chronic, stable.    Continue home  "atorvastatin 80 mg daily    Coronary artery disease  Assessment & Plan  Chronic, history of cardiac catheterization in 2010 2020.  S/p 2 stent placement at [facility] in 2020.  Home medications include aspirin 81 mg daily and carvedilol 6.25 mg twice daily    Continue home aspirin and Coreg with hold parameters          Condition at Discharge: stable      Discharge Day Visit / Exam:      Vitals: Blood Pressure: 105/86 (02/17/24 0853)  Pulse: 62 (02/17/24 0853)  Temperature: (!) 97.4 °F (36.3 °C) (02/17/24 0802)  Temp Source: Oral (02/15/24 2011)  Respirations: 14 (02/17/24 0802)  Height: 5' 7\" (170.2 cm) (02/15/24 2011)  Weight - Scale: (!) 176 kg (387 lb 9.6 oz) (02/17/24 0559)  SpO2: (!) 89 % (02/17/24 0853)  Exam:   Physical Exam  Constitutional:       Appearance: Normal appearance. He is obese.   HENT:      Mouth/Throat:      Mouth: Mucous membranes are moist.   Eyes:      General:         Right eye: No discharge.   Cardiovascular:      Rate and Rhythm: Normal rate and regular rhythm.      Pulses: Normal pulses.      Heart sounds: Normal heart sounds.   Pulmonary:      Effort: Pulmonary effort is normal.      Breath sounds: Normal breath sounds.   Abdominal:      General: Abdomen is flat.      Palpations: Abdomen is soft.   Musculoskeletal:         General: Normal range of motion.      Left lower leg: Edema (Lymphadema w/tenderness) present.   Skin:     General: Skin is warm and dry.      Capillary Refill: Capillary refill takes less than 2 seconds.      Findings: Rash present.      Comments: Petechial rash on RLE   Neurological:      General: No focal deficit present.      Mental Status: He is alert and oriented to person, place, and time. Mental status is at baseline.   Psychiatric:         Mood and Affect: Mood normal.         Behavior: Behavior normal.         Thought Content: Thought content normal.         Judgment: Judgment normal.         Patient Information Sharing: With the patient     Discharge " instructions/Information to patient and family:   See after visit summary for information provided to patient and family.       Discharge Medications:     Medication List      START taking these medications     albuterol 90 mcg/act inhaler; Commonly known as: PROVENTIL HFA,VENTOLIN   HFA; Inhale 2 puffs every 4 (four) hours as needed for wheezing   cephalexin 500 mg capsule; Commonly known as: KEFLEX; Take 1 capsule   (500 mg total) by mouth every 6 (six) hours for 5 days   fluticasone 50 mcg/act nasal spray; Commonly known as: FLONASE; 1 spray   into each nostril 2 (two) times a day for 4 days   guaiFENesin 600 mg 12 hr tablet; Commonly known as: MUCINEX; Take 1   tablet (600 mg total) by mouth every 12 (twelve) hours as needed for cough   for up to 5 days   oxyCODONE-acetaminophen 5-325 mg per tablet; Commonly known as:   Percocet; Take 1 tablet by mouth every 6 (six) hours as needed for   moderate pain for up to 5 days Take 1 tab for moderate pain and 2 tablets   for severe pain Max Daily Amount: 4 tablets   predniSONE 20 mg tablet; Take 2 tablets (40 mg total) by mouth daily for   3 doses; Start taking on: February 18, 2024     CONTINUE taking these medications     aspirin 81 mg EC tablet; Commonly known as: ECOTRIN LOW STRENGTH; Take 1   tablet (81 mg total) by mouth daily   atorvastatin 80 mg tablet; Commonly known as: LIPITOR; Take 1 tablet (80   mg total) by mouth daily   BD ULTRA-FINE PEN NEEDLES 29G X 12.7MM Misc; Generic drug: Insulin Pen   Needle; USE 1 NEEDLE ONCE DAILY FOR INJECTION UNDER THE SKIN   carvedilol 6.25 mg tablet; Commonly known as: COREG; TAKE ONE TABLET BY   MOUTH TWICE A DAY WITH MEALS   EPINEPHrine 0.3 mg/0.3 mL Soaj; Commonly known as: EPIPEN; Inject 0.3 mL   (0.3 mg total) into a muscle once for 1 dose   glimepiride 2 mg tablet; Commonly known as: AMARYL; Take 1 tablet (2 mg   total) by mouth daily with breakfast   * insulin glargine 100 units/mL subcutaneous injection; Commonly known    as: LANTUS; Inject 18 Units under the skin daily at bedtime for 3 days   * insulin glargine 100 units/mL subcutaneous injection; Commonly known   as: LANTUS; Inject 12 Units under the skin daily at bedtime Do not start   before February 21, 2024.; Start taking on: February 21, 2024   losartan 25 mg tablet; Commonly known as: COZAAR; Take 1 tablet (25 mg   total) by mouth daily   metFORMIN 1000 MG tablet; Commonly known as: GLUCOPHAGE; Take 1 tablet   (1,000 mg total) by mouth 2 (two) times a day with meals   zinc gluconate 50 mg tablet; Take 1 tablet (50 mg total) by mouth daily  * This list has 2 medication(s) that are the same as other medications   prescribed for you. Read the directions carefully, and ask your doctor or   other care provider to review them with you.        Disposition:   Home     For Discharges to [facility]:   ·       Not Applicable to this Patient - Not Applicable to this Patient     Discharge Statement:  I spent 45 minutes discharging the patient. This time was spent on the day of discharge. I had direct contact with the patient on the day of discharge. Greater than 50% of the total time was spent examining patient, answering all patient questions, arranging and discussing plan of care with patient as well as directly providing post-discharge instructions.  Additional time then spent on discharge activities.     ** Please Note: This note has been constructed using a voice recognition system **     Lillie Borges,    02/17/24  2:26 PM

## 2024-02-19 LAB
C-ANCA TITR SER IF: NORMAL TITER
MYELOPEROXIDASE AB SER IA-ACNC: <0.2 UNITS (ref 0–0.9)
P-ANCA ATYPICAL TITR SER IF: NORMAL TITER
P-ANCA TITR SER IF: NORMAL TITER
PROTEINASE3 AB SER IA-ACNC: <0.2 UNITS (ref 0–0.9)

## 2024-02-20 ENCOUNTER — TRANSITIONAL CARE MANAGEMENT (OUTPATIENT)
Dept: FAMILY MEDICINE CLINIC | Facility: CLINIC | Age: 44
End: 2024-02-20

## 2024-02-20 LAB
BACTERIA BLD CULT: NORMAL
BACTERIA BLD CULT: NORMAL

## 2024-02-20 NOTE — UTILIZATION REVIEW
NOTIFICATION OF ADMISSION DISCHARGE   This is a Notification of Discharge from [facility]. Please be advised that this patient has been discharge from our facility. Below you will find the admission and discharge date and time including the patient’s disposition.   UTILIZATION REVIEW CONTACT:  Ghazal Bowens  Utilization   Network Utilization Review Department  Phone: [phone] x carefully listen to the prompts. All voicemails are confidential.  Email: [email or web address]     ADMISSION INFORMATION  PRESENTATION DATE: 2/15/2024  4:20 PM  OBERVATION ADMISSION DATE:   INPATIENT ADMISSION DATE: 2/15/24  6:24 PM   DISCHARGE DATE: 2/17/2024  3:31 PM   DISPOSITION:Home/Self Care    Network Utilization Review Department  ATTENTION: Please call with any questions or concerns to [phone] and carefully listen to the prompts so that you are directed to the right person. All voicemails are confidential.   For Discharge needs, contact Care Management DC Support Team at [phone] opt. 2  Send all requests for admission clinical reviews, approved or denied determinations and any other requests to dedicated fax number below belonging to the campus where the patient is receiving treatment. List of dedicated fax numbers for the Facilities:  FACILITY NAME UR FAX NUMBER   ADMISSION DENIALS (Administrative/Medical Necessity) [phone]   DISCHARGE SUPPORT TEAM ([facility]) [phone]   PARENT CHILD HEALTH (Maternity/NICU/Pediatrics) [phone]   [facility] [phone]   [facility] [phone]   [facility] [phone]   [facility] [phone]   [facility] [phone]   [facility] [phone]   [facility] [phone]   [facility]  [phone]   [facility] [phone]   [facility] [phone]   [facility] [phone]   [facility] [phone]

## 2024-02-21 PROBLEM — J21.0 RSV (ACUTE BRONCHIOLITIS DUE TO RESPIRATORY SYNCYTIAL VIRUS): Status: RESOLVED | Noted: 2024-02-15 | Resolved: 2024-02-21

## 2024-02-21 PROBLEM — A41.9 SEPSIS (HCC): Status: RESOLVED | Noted: 2022-05-04 | Resolved: 2024-02-21

## 2024-02-28 ENCOUNTER — OFFICE VISIT (OUTPATIENT)
Dept: FAMILY MEDICINE CLINIC | Facility: CLINIC | Age: 44
End: 2024-02-28
Payer: COMMERCIAL

## 2024-02-28 VITALS
DIASTOLIC BLOOD PRESSURE: 70 MMHG | BODY MASS INDEX: 59.67 KG/M2 | RESPIRATION RATE: 18 BRPM | TEMPERATURE: 99.2 F | WEIGHT: 315 LBS | SYSTOLIC BLOOD PRESSURE: 134 MMHG

## 2024-02-28 DIAGNOSIS — M79.604 PAIN OF RIGHT LOWER EXTREMITY: Primary | ICD-10-CM

## 2024-02-28 DIAGNOSIS — I10 ESSENTIAL HYPERTENSION: ICD-10-CM

## 2024-02-28 DIAGNOSIS — I25.10 CAD (CORONARY ARTERY DISEASE): ICD-10-CM

## 2024-02-28 DIAGNOSIS — R21 RASH: ICD-10-CM

## 2024-02-28 DIAGNOSIS — E11.42 TYPE 2 DIABETES MELLITUS WITH DIABETIC POLYNEUROPATHY, WITHOUT LONG-TERM CURRENT USE OF INSULIN (HCC): ICD-10-CM

## 2024-02-28 DIAGNOSIS — I89.0 LYMPHEDEMA: ICD-10-CM

## 2024-02-28 DIAGNOSIS — E78.5 DYSLIPIDEMIA: ICD-10-CM

## 2024-02-28 DIAGNOSIS — E11.69 TYPE 2 DIABETES MELLITUS WITH OTHER SPECIFIED COMPLICATION, WITHOUT LONG-TERM CURRENT USE OF INSULIN (HCC): ICD-10-CM

## 2024-02-28 PROCEDURE — 99495 TRANSJ CARE MGMT MOD F2F 14D: CPT | Performed by: NURSE PRACTITIONER

## 2024-02-28 RX ORDER — AMLODIPINE BESYLATE 10 MG/1
10 TABLET ORAL DAILY
Start: 2024-02-28 | End: 2024-03-29

## 2024-02-28 RX ORDER — OXYCODONE HYDROCHLORIDE AND ACETAMINOPHEN 5; 325 MG/1; MG/1
1 TABLET ORAL EVERY 8 HOURS PRN
Qty: 42 TABLET | Refills: 0 | Status: SHIPPED | OUTPATIENT
Start: 2024-02-28 | End: 2024-03-13

## 2024-02-28 RX ORDER — TRIAMCINOLONE ACETONIDE 1 MG/G
CREAM TOPICAL 2 TIMES DAILY
Qty: 15 G | Refills: 2 | Status: SHIPPED | OUTPATIENT
Start: 2024-02-28

## 2024-02-28 RX ORDER — GLIMEPIRIDE 2 MG/1
2 TABLET ORAL
Qty: 30 TABLET | Refills: 3 | Status: SHIPPED | OUTPATIENT
Start: 2024-02-28 | End: 2024-03-29

## 2024-02-28 RX ORDER — LISINOPRIL 20 MG/1
20 TABLET ORAL DAILY
Qty: 30 TABLET | Refills: 3
Start: 2024-02-28 | End: 2024-03-29

## 2024-02-28 RX ORDER — ATORVASTATIN CALCIUM 80 MG/1
80 TABLET, FILM COATED ORAL DAILY
Qty: 90 TABLET | Refills: 3 | Status: SHIPPED | OUTPATIENT
Start: 2024-02-28

## 2024-02-28 RX ORDER — CARVEDILOL 6.25 MG/1
6.25 TABLET ORAL 2 TIMES DAILY WITH MEALS
Qty: 60 TABLET | Refills: 3 | Status: SHIPPED | OUTPATIENT
Start: 2024-02-28

## 2024-02-28 RX ORDER — AMLODIPINE BESYLATE 10 MG/1
TABLET ORAL
Qty: 90 TABLET | Refills: 1 | OUTPATIENT
Start: 2024-02-28

## 2024-02-28 NOTE — PROGRESS NOTES
Diabetic Foot Exam    Patient's shoes and socks removed.    Right Foot/Ankle   Right Foot Inspection  Skin Exam: skin normal and skin intact. No dry skin, no warmth, no callus, no erythema, no maceration, no abnormal color, no pre-ulcer, no ulcer and no callus.     Toe Exam: ROM and strength within normal limits.     Sensory   Monofilament testing: intact    Vascular  Capillary refills: < 3 seconds  The right DP pulse is 2+. The right PT pulse is 2+.     Left Foot/Ankle  Left Foot Inspection  Skin Exam: skin normal and skin intact. No dry skin, no warmth, no erythema, no maceration, normal color, no pre-ulcer, no ulcer and no callus.     Toe Exam: ROM and strength within normal limits.     Sensory   Monofilament testing: intact    Vascular  Capillary refills: < 3 seconds  The left DP pulse is 2+. The left PT pulse is 2+.     Assign Risk Category  No deformity present  No loss of protective sensation  No weak pulses  Risk: 0      Assessment & Plan     1. Pain of right lower extremity  -     oxyCODONE-acetaminophen (Percocet) 5-325 mg per tablet; Take 1 tablet by mouth every 8 (eight) hours as needed for moderate pain for up to 14 days Max Daily Amount: 3 tablets    2. Rash  -     triamcinolone (KENALOG) 0.1 % cream; Apply topically 2 (two) times a day    3. Essential hypertension  -     lisinopril (ZESTRIL) 20 mg tablet; Take 1 tablet (20 mg total) by mouth daily  -     amLODIPine (NORVASC) 10 mg tablet; Take 1 tablet (10 mg total) by mouth daily  -     carvedilol (COREG) 6.25 mg tablet; Take 1 tablet (6.25 mg total) by mouth 2 (two) times a day with meals    4. Type 2 diabetes mellitus with diabetic polyneuropathy, without long-term current use of insulin ([facility])  -     glimepiride (AMARYL) 2 mg tablet; Take 1 tablet (2 mg total) by mouth daily with breakfast    5. Type 2 diabetes mellitus with other specified complication, without long-term current use of insulin ([facility])  -     metFORMIN (GLUCOPHAGE) 1000 MG tablet;  Take 1 tablet (1,000 mg total) by mouth 2 (two) times a day with meals    6. CAD (coronary artery disease)    7. Dyslipidemia  -     atorvastatin (LIPITOR) 80 mg tablet; Take 1 tablet (80 mg total) by mouth daily    8. Lymphedema        Depression Screening and Follow-up Plan: Patient was screened for depression during today's encounter. They screened negative with a PHQ-2 score of 0.      Subjective     Transitional Care Management Review:   Joaquin Frankel is a 43 y.o. male here for TCM follow up.     During the TCM phone call patient stated:  TCM Call       Date and time call was made  2/20/2024  2:31 PM    Hospital care reviewed  Records reviewed    Patient was hospitialized at  [facility]    Date of Admission  02/15/24    Date of discharge  02/17/24    Diagnosis  Sepsis    Disposition  Home    Were the patients medications reviewed and updated  Yes    Current Symptoms  Leg pain - left side; Leg pain - right side    Left side leg pain severity  Mild    Leg pain, left side, onset  Other (comment)    Right side leg pain severity  Mild    Leg pain, right side, onset  Other (comment)          TCM Call       Post hospital issues  Reduced activity    Should patient be enrolled in anticoag monitoring?  No    Scheduled for follow up?  Yes    Patient refusal reason  Patient stated he wanted to follow up with General Surgery    Patients specialists  Other (comment)    Other specialists names  Wound Care    Referrals needed  Needs Referrals to Pulmonoly, Cardiology, General Surgery and Vascular Surgery    Did you obtain your prescribed medications  Yes    Do you need help managing your prescriptions or medications  No    Is transportation to your appointment needed  No    I have advised the patient to call PCP with any new or worsening symptoms  Jayna Palm LPN    Living Arrangements  Alone    Support System  Family    The type of support provided  Emotional; Physical    Do you have social support  Yes, as much  "as I need    Are you recieving any outpatient services  No    Are you recieving home care services  No    Are you using any community resources  No    Current waiver services  No    Have you fallen in the last 12 months  No    Interperter language line needed  No    Counseling  Patient          43-year-old male presented for TCM with history of CAD, hypertension, diabetes, chronic lymphedema of the left leg, hyperlipidemia discharged from the hospital on 2/17/24 after 2 day stay for left leg cellulitis presented with upper respiratory symptoms for 5 days following which patient developed painful rash in the right leg.  Patient was sent to the hospital and noted to have purpuric rash on the right leg which was treated with IV steroids which was later changed to prednisone.  Patient also reported some scant hemoptysis and CAT scan of the chest was performed which showed only bronchitis  Have you restarted your Glimepiride and Metformin? yes  Are you taking your insulin Lantus at night? yes  Have you restarted your home losartan? No-Patient chose to return to medications he was previously prescribed by cardiologist and will follow up with cardio  Have you completed your Keflex? Yes-last one today  How is your rash/pain? Pain leg 7/10 described feels like  \"road rash\"-rash picture in media-gave patient additional 14 days of percocet as he stated the pain is unbearable at times.  Completed opioid agreement form with him.        Review of Systems   Constitutional: Negative.    HENT:  Positive for congestion.    Eyes: Negative.    Respiratory: Negative.     Cardiovascular:  Positive for leg swelling.        Left leg lymphedema   Gastrointestinal: Negative.    Endocrine: Negative.    Genitourinary: Negative.    Musculoskeletal: Negative.    Skin:  Positive for rash.   Allergic/Immunologic: Negative.    Neurological: Negative.    Hematological: Negative.    Psychiatric/Behavioral: Negative.         Objective     /70   " Temp 99.2 °F (37.3 °C)   Resp 18   Wt (!) 173 kg (381 lb)   BMI 59.67 kg/m²      Physical Exam  Constitutional:       General: He is not in acute distress.     Appearance: He is obese. He is not ill-appearing, toxic-appearing or diaphoretic.   HENT:      Head: Normocephalic and atraumatic.      Right Ear: External ear normal.      Left Ear: External ear normal.      Nose: Nose normal. No congestion.      Mouth/Throat:      Mouth: Mucous membranes are moist.   Eyes:      General:         Right eye: No discharge.         Left eye: No discharge.      Conjunctiva/sclera: Conjunctivae normal.   Cardiovascular:      Rate and Rhythm: Normal rate and regular rhythm.      Pulses: no weak pulses.           Dorsalis pedis pulses are 2+ on the right side and 2+ on the left side.        Posterior tibial pulses are 2+ on the right side and 2+ on the left side.      Heart sounds: No murmur heard.  Pulmonary:      Effort: Pulmonary effort is normal. No respiratory distress.      Breath sounds: Normal breath sounds. No wheezing, rhonchi or rales.   Abdominal:      General: Bowel sounds are normal.      Tenderness: There is no abdominal tenderness. There is no guarding.   Musculoskeletal:         General: Normal range of motion.      Cervical back: Normal range of motion. No rigidity.      Right lower leg: No edema.      Left lower leg: No edema.        Feet:    Feet:      Right foot:      Skin integrity: No ulcer, skin breakdown, erythema, warmth, callus or dry skin.      Left foot:      Skin integrity: No ulcer, skin breakdown, erythema, warmth, callus or dry skin.   Skin:     General: Skin is warm and dry.      Findings: Lesion present.      Comments: Multiple lesions/scabs right leg  Several scabs left foot  See media and diabetic foot exam   Neurological:      General: No focal deficit present.      Mental Status: He is alert and oriented to person, place, and time.      Gait: Gait normal.   Psychiatric:         Mood and  Affect: Mood normal.         Behavior: Behavior normal.         Thought Content: Thought content normal.         Judgment: Judgment normal.       Medications have been reviewed by provider in current encounter    PETRA Flores

## 2024-03-01 PROBLEM — Z98.890 H/O DRAINAGE OF ABSCESS: Status: RESOLVED | Noted: 2021-06-21 | Resolved: 2024-03-01

## 2024-03-01 PROBLEM — L02.31 ABSCESS OF MULTIPLE SITES OF BUTTOCK: Status: RESOLVED | Noted: 2022-05-04 | Resolved: 2024-03-01

## 2024-03-01 PROBLEM — L03.116 CELLULITIS OF LEFT LOWER EXTREMITY: Status: RESOLVED | Noted: 2021-06-23 | Resolved: 2024-03-01

## 2024-03-01 PROBLEM — I89.0 LYMPHEDEMA OF BOTH LOWER EXTREMITIES: Status: RESOLVED | Noted: 2022-06-15 | Resolved: 2024-03-01

## 2024-03-01 PROBLEM — N17.9 AKI (ACUTE KIDNEY INJURY) (HCC): Status: RESOLVED | Noted: 2021-02-22 | Resolved: 2024-03-01

## 2024-03-01 PROBLEM — E11.9 DIABETES MELLITUS (HCC): Status: ACTIVE | Noted: 2018-07-26

## 2024-03-01 PROBLEM — R21 RASH: Status: ACTIVE | Noted: 2024-02-15

## 2024-03-01 NOTE — TELEPHONE ENCOUNTER
Patient is calling to check the status of Rx's. Patient has been out of medication for the past week. Looks like we tried sending Rx 2/28/24.    Lisinopril   Amlodipine

## 2024-03-02 PROBLEM — N18.2 CKD STAGE 2 DUE TO TYPE 2 DIABETES MELLITUS: Status: ACTIVE | Noted: 2024-03-02

## 2024-03-02 PROBLEM — E11.29 TYPE 2 DIABETES MELLITUS WITH KIDNEY COMPLICATION, WITHOUT LONG-TERM CURRENT USE OF INSULIN (HCC): Status: ACTIVE | Noted: 2018-07-26

## 2024-03-02 PROBLEM — Z79.4 TYPE 2 DIABETES MELLITUS WITH KIDNEY COMPLICATION, WITH LONG-TERM CURRENT USE OF INSULIN (HCC): Status: ACTIVE | Noted: 2018-07-26

## 2024-03-02 PROBLEM — E11.22 CKD STAGE 2 DUE TO TYPE 2 DIABETES MELLITUS: Status: ACTIVE | Noted: 2024-03-02

## 2024-03-02 PROBLEM — G47.33 OSA (OBSTRUCTIVE SLEEP APNEA): Status: ACTIVE | Noted: 2024-01-30

## 2024-03-02 PROBLEM — E78.49 OTHER HYPERLIPIDEMIA: Status: ACTIVE | Noted: 2017-01-23

## 2024-03-02 PROBLEM — K52.9 COLITIS: Status: RESOLVED | Noted: 2022-05-04 | Resolved: 2024-03-02

## 2024-03-04 ENCOUNTER — TELEPHONE (OUTPATIENT)
Age: 44
End: 2024-03-04

## 2024-03-04 DIAGNOSIS — I10 ESSENTIAL HYPERTENSION: ICD-10-CM

## 2024-03-04 RX ORDER — LISINOPRIL 20 MG/1
20 TABLET ORAL DAILY
Qty: 30 TABLET | Refills: 3 | Status: SHIPPED | OUTPATIENT
Start: 2024-03-04 | End: 2024-04-03

## 2024-03-04 RX ORDER — AMLODIPINE BESYLATE 10 MG/1
10 TABLET ORAL DAILY
Qty: 30 TABLET | Refills: 3 | Status: SHIPPED | OUTPATIENT
Start: 2024-03-04 | End: 2024-04-03

## 2024-03-04 NOTE — TELEPHONE ENCOUNTER
PT called again because he has not yet received his medication. He has been out of meds, please resend. The original or 2nd request did not go through to pharmacy. Thank You

## 2024-04-03 ENCOUNTER — OFFICE VISIT (OUTPATIENT)
Dept: FAMILY MEDICINE CLINIC | Facility: CLINIC | Age: 44
End: 2024-04-03
Payer: COMMERCIAL

## 2024-04-03 VITALS
TEMPERATURE: 98.9 F | HEIGHT: 67 IN | BODY MASS INDEX: 49.44 KG/M2 | RESPIRATION RATE: 18 BRPM | WEIGHT: 315 LBS | DIASTOLIC BLOOD PRESSURE: 62 MMHG | SYSTOLIC BLOOD PRESSURE: 120 MMHG | HEART RATE: 104 BPM

## 2024-04-03 DIAGNOSIS — I89.0 LYMPHEDEMA: ICD-10-CM

## 2024-04-03 DIAGNOSIS — N18.2 TYPE 2 DIABETES MELLITUS WITH STAGE 2 CHRONIC KIDNEY DISEASE, WITH LONG-TERM CURRENT USE OF INSULIN (HCC): Primary | ICD-10-CM

## 2024-04-03 DIAGNOSIS — L73.2 HIDRADENITIS SUPPURATIVA: ICD-10-CM

## 2024-04-03 DIAGNOSIS — N18.2 CKD STAGE 2 DUE TO TYPE 2 DIABETES MELLITUS  (HCC): ICD-10-CM

## 2024-04-03 DIAGNOSIS — E78.49 OTHER HYPERLIPIDEMIA: ICD-10-CM

## 2024-04-03 DIAGNOSIS — E11.22 CKD STAGE 2 DUE TO TYPE 2 DIABETES MELLITUS  (HCC): ICD-10-CM

## 2024-04-03 DIAGNOSIS — G47.33 OSA (OBSTRUCTIVE SLEEP APNEA): ICD-10-CM

## 2024-04-03 DIAGNOSIS — I10 ESSENTIAL HYPERTENSION: ICD-10-CM

## 2024-04-03 DIAGNOSIS — E87.1 HYPONATREMIA: ICD-10-CM

## 2024-04-03 DIAGNOSIS — E11.22 TYPE 2 DIABETES MELLITUS WITH STAGE 2 CHRONIC KIDNEY DISEASE, WITH LONG-TERM CURRENT USE OF INSULIN (HCC): Primary | ICD-10-CM

## 2024-04-03 DIAGNOSIS — I25.10 CORONARY ARTERY DISEASE INVOLVING NATIVE CORONARY ARTERY OF NATIVE HEART WITHOUT ANGINA PECTORIS: ICD-10-CM

## 2024-04-03 DIAGNOSIS — Z95.5 HISTORY OF CORONARY ANGIOPLASTY WITH INSERTION OF STENT: ICD-10-CM

## 2024-04-03 DIAGNOSIS — Z87.891 STOPPED SMOKING WITH GREATER THAN 20 PACK YEAR HISTORY: ICD-10-CM

## 2024-04-03 DIAGNOSIS — E66.01 MORBID OBESITY (HCC): ICD-10-CM

## 2024-04-03 DIAGNOSIS — D64.89 ANEMIA DUE TO OTHER CAUSE, NOT CLASSIFIED: ICD-10-CM

## 2024-04-03 DIAGNOSIS — Z79.4 TYPE 2 DIABETES MELLITUS WITH STAGE 2 CHRONIC KIDNEY DISEASE, WITH LONG-TERM CURRENT USE OF INSULIN (HCC): Primary | ICD-10-CM

## 2024-04-03 PROBLEM — R21 RASH: Status: RESOLVED | Noted: 2024-02-15 | Resolved: 2024-04-03

## 2024-04-03 PROCEDURE — 99215 OFFICE O/P EST HI 40 MIN: CPT | Performed by: FAMILY MEDICINE

## 2024-04-03 NOTE — PROGRESS NOTES
Assessment/Plan:    No problem-specific Assessment & Plan notes found for this encounter.    DM2 uncontrolled with last A1c 12  Overdue for update  Advised that it seems  Diabetic education ordered  Referral to endocrinology for insulin management and support, he should call for appt asap  Risks of DM advised, not limited to CAD/MI/CVA/ESRD/PAD/infection/amputation/poor healing    Ckd2  Stay hydrated  Upc yearly    Htn stable  Continue meds  Purpose of ACEi advised    HLD  Importance of statin for CAD and stent and DM2 advised  He should have more discussion with his cardiologist    CAD/stent  I advised close f/u with cardiologist, referral given to his last [facility] cardiologist  Risks of recurrence advised    Bmi aware  He should consider wt mgmt in addition to DM education    ANGY untreated  Advised of impact on weight, cad, metabolism, edema, lymphedema  Referral given to sleep lab for tx and f/u    Lymphedema left leg  Risks of infection aware, vipin with uncontrolled DM2  Has been to lymphedema PT  Suggest use of pumps and compression  Weight loss  Vascular surgery referral    Left lower abdomen hidradenitis suppurativa, over 1 year duration  Has seen surgeon in past per records though he does not recall  Notes reviewed with pt  Suggest surgical eval  He has also seen dermatology, Dr Panchal  Risks of infection and sepsis from uncontrolled DM2 advised  General surgery referral given    Mild anemia noted  monitor    Mild hyponatremia  Possible due to hyperglycemia    Right leg skin discoloration  Pt thinks its from RSV  Suspect NLD    No definite anaphylaxis  I do not recommend epipen without clear indication especially with CAD and beta blocker  Suggest he see an allergist when ready     Diagnoses and all orders for this visit:    Type 2 diabetes mellitus with stage 2 chronic kidney disease, with long-term current use of insulin (HCC)  -     Comprehensive metabolic panel; Future  -     Hemoglobin A1C; Future  -      Albumin / creatinine urine ratio; Future  -     Lipid Panel with Direct LDL reflex; Future  -     Ambulatory referral to Endocrinology; Future    CKD stage 2 due to type 2 diabetes mellitus  (HCC)    Essential hypertension    Other hyperlipidemia    Morbid obesity (HCC)    FILEMON (obstructive sleep apnea)  -     Ambulatory referral to Sleep Medicine; Future    Coronary artery disease involving native coronary artery of native heart without angina pectoris  -     Ambulatory referral to Cardiology; Future    Lymphedema  -     Ambulatory Referral to Vascular Surgery; Future    Hidradenitis suppurativa  -     Ambulatory referral to General Surgery; Future    Anemia due to other cause, not classified    History of coronary angioplasty with insertion of stent    Hyponatremia    Stopped smoking with greater than 20 pack year history      1y cystic growth, lower left abd    Return in about 1 month (around 5/3/2024) for Recheck.    Subjective:      Patient ID: Joaquin Frankel is a 43 y.o. male.    Chief Complaint   Patient presents with    Establish Care     [facility]       HPI  Quit tob 3y ago, 1ppd for 20 year ago    3 bp meds  Low fat and low salt diet  Some exercise  No wt loss, eats well per pt  2000cal/d    Thought about gastric bypass  No wt mgmt eval in past    Stopped lipitor 80mg/d about 6m ago  Has CAD/stent  Gi upset? Not sure.'  Sees a cardiologist, Dr Fulton, [facility]    Epipen for?  No anaphylaxis  No allergist    Last A1c 12.1  On lantus  On amaryl 2mg/d in past month  Smbg once a day only in am    Filemon w/o machine  Risks aware  Advised importance of treatment    Chronic left leg lymphedema  Has the pumps at home  Never been to vascular surgeon  Hx of cellulitis    The following portions of the patient's history were reviewed and updated as appropriate: allergies, current medications, past family history, past medical history, past social history, past surgical history and problem list.    Review of Systems  "  Constitutional:  Negative for chills and fever.   Cardiovascular:  Positive for leg swelling. Negative for chest pain.         Current Outpatient Medications   Medication Sig Dispense Refill    amLODIPine (NORVASC) 10 mg tablet Take 1 tablet (10 mg total) by mouth daily 30 tablet 3    aspirin (ECOTRIN LOW STRENGTH) 81 mg EC tablet Take 1 tablet (81 mg total) by mouth daily 30 tablet 0    carvedilol (COREG) 6.25 mg tablet Take 1 tablet (6.25 mg total) by mouth 2 (two) times a day with meals 60 tablet 3    EPINEPHrine (EPIPEN) 0.3 mg/0.3 mL SOAJ Inject 0.3 mL (0.3 mg total) into a muscle once for 1 dose 0.6 mL 0    glimepiride (AMARYL) 2 mg tablet Take 1 tablet (2 mg total) by mouth daily with breakfast 30 tablet 3    insulin glargine (LANTUS) 100 units/mL subcutaneous injection Inject 12 Units under the skin daily at bedtime Do not start before February 21, 2024. 10 mL 0    Insulin Pen Needle (BD ULTRA-FINE PEN NEEDLES) 29G X 12.7MM MISC USE 1 NEEDLE ONCE DAILY FOR INJECTION UNDER THE SKIN 50 each 3    lisinopril (ZESTRIL) 20 mg tablet Take 1 tablet (20 mg total) by mouth daily 30 tablet 3    metFORMIN (GLUCOPHAGE) 1000 MG tablet Take 1 tablet (1,000 mg total) by mouth 2 (two) times a day with meals 180 tablet 3    atorvastatin (LIPITOR) 80 mg tablet Take 1 tablet (80 mg total) by mouth daily (Patient not taking: Reported on 4/3/2024) 90 tablet 3    fluticasone (FLONASE) 50 mcg/act nasal spray 1 spray into each nostril 2 (two) times a day for 4 days (Patient not taking: Reported on 4/3/2024) 9.9 mL 0     No current facility-administered medications for this visit.       Objective:    /62   Pulse 104   Temp 98.9 °F (37.2 °C) (Tympanic)   Resp 18   Ht 5' 7\" (1.702 m)   Wt (!) 176 kg (388 lb 6 oz)   BMI 60.83 kg/m²        Physical Exam  Vitals and nursing note reviewed.   Constitutional:       General: He is not in acute distress.     Appearance: He is well-developed. He is obese. He is not ill-appearing. "   HENT:      Head: Normocephalic.      Right Ear: Tympanic membrane and ear canal normal.      Left Ear: Tympanic membrane and ear canal normal.   Eyes:      General: No scleral icterus.     Conjunctiva/sclera: Conjunctivae normal.   Neck:      Vascular: No carotid bruit.   Cardiovascular:      Rate and Rhythm: Normal rate and regular rhythm.      Heart sounds: No murmur heard.  Pulmonary:      Effort: Pulmonary effort is normal. No respiratory distress.      Breath sounds: No wheezing or rales.   Abdominal:      Palpations: Abdomen is soft.      Tenderness: There is no abdominal tenderness.      Comments: Left lower abdomen cystic mass   Musculoskeletal:         General: No deformity.      Cervical back: Neck supple.      Right lower leg: No edema.      Left lower leg: Edema present.      Comments: Large amount of left leg lymphedema with burly skin changes   Skin:     General: Skin is warm and dry.      Coloration: Skin is not jaundiced or pale.      Comments: Right lower leg patchy discoloration suspect NLD   Neurological:      Mental Status: He is alert.      Motor: No weakness.      Gait: Gait normal.   Psychiatric:         Mood and Affect: Mood normal.         Behavior: Behavior normal.         Thought Content: Thought content normal.       41 minutes spent with patient and with chart review and documentation completion.           Hany Mcfarland DO

## 2024-04-05 ENCOUNTER — TELEPHONE (OUTPATIENT)
Age: 44
End: 2024-04-05

## 2024-04-05 ENCOUNTER — HOSPITAL ENCOUNTER (EMERGENCY)
Facility: HOSPITAL | Age: 44
Discharge: HOME | End: 2024-04-05
Attending: EMERGENCY MEDICINE
Payer: COMMERCIAL

## 2024-04-05 VITALS
RESPIRATION RATE: 18 BRPM | DIASTOLIC BLOOD PRESSURE: 70 MMHG | TEMPERATURE: 98.3 F | SYSTOLIC BLOOD PRESSURE: 151 MMHG | HEART RATE: 83 BPM | OXYGEN SATURATION: 95 %

## 2024-04-05 DIAGNOSIS — L03.116 CELLULITIS OF LEFT LOWER EXTREMITY: Primary | ICD-10-CM

## 2024-04-05 DIAGNOSIS — I89.0 LYMPHEDEMA: ICD-10-CM

## 2024-04-05 DIAGNOSIS — E11.9 TYPE 2 DIABETES MELLITUS (HCC): ICD-10-CM

## 2024-04-05 LAB — GLUCOSE SERPL-MCNC: 104 MG/DL (ref 65–140)

## 2024-04-05 PROCEDURE — 82948 REAGENT STRIP/BLOOD GLUCOSE: CPT

## 2024-04-05 PROCEDURE — 99284 EMERGENCY DEPT VISIT MOD MDM: CPT | Performed by: EMERGENCY MEDICINE

## 2024-04-05 PROCEDURE — 99283 EMERGENCY DEPT VISIT LOW MDM: CPT

## 2024-04-05 RX ORDER — CEPHALEXIN 500 MG/1
500 CAPSULE ORAL EVERY 6 HOURS SCHEDULED
Qty: 28 CAPSULE | Refills: 0 | Status: SHIPPED | OUTPATIENT
Start: 2024-04-05 | End: 2024-04-12

## 2024-04-05 RX ORDER — OXYCODONE HYDROCHLORIDE AND ACETAMINOPHEN 5; 325 MG/1; MG/1
1 TABLET ORAL EVERY 6 HOURS PRN
Qty: 12 TABLET | Refills: 0 | Status: SHIPPED | OUTPATIENT
Start: 2024-04-05 | End: 2024-04-15

## 2024-04-05 RX ORDER — OXYCODONE HYDROCHLORIDE 5 MG/1
5 TABLET ORAL ONCE
Status: COMPLETED | OUTPATIENT
Start: 2024-04-05 | End: 2024-04-05

## 2024-04-05 RX ORDER — CEPHALEXIN 500 MG/1
500 CAPSULE ORAL ONCE
Status: COMPLETED | OUTPATIENT
Start: 2024-04-05 | End: 2024-04-05

## 2024-04-05 RX ADMIN — CEPHALEXIN 500 MG: 500 CAPSULE ORAL at 03:49

## 2024-04-05 RX ADMIN — OXYCODONE HYDROCHLORIDE 5 MG: 5 TABLET ORAL at 03:49

## 2024-04-05 NOTE — TELEPHONE ENCOUNTER
Joaquin called in to schedule a consult to discuss a sleep study . Transfer patient to sleep center since the patient is looking to be seen in [place]

## 2024-04-05 NOTE — DISCHARGE INSTRUCTIONS
You will take 1 capsule of the Keflex every 6 hours for the next 7 days.  Please continue 650 mg of Tylenol and 600 mg of ibuprofen every 6 hours.  If your pain is still severe you can take 1 tablet of the Percocet every 6 hours in addition to the medications above.    Please follow-up with your primary care doctor.    Return to ER if you develop fevers or chills.

## 2024-04-05 NOTE — ED PROVIDER NOTES
History  Chief Complaint   Patient presents with    Leg Pain     Patient c/o left leg pain starting at 17:00, does have lymphedema on that side, did tried wrapping, but pain has progressed, patient is a diabetic also     43-year-old male past medical history significant for lymphedema in the left lower extremity after surgical procedure presenting to the ED today with left lower leg pain.  Patient states that this pain started about 5 PM.  He saw his physician about 2 days ago here he said it was not red at that time he is also not having much pain.  He has been doing compression as best he can however he is having increasing pain.  No fevers or chills.        Prior to Admission Medications   Prescriptions Last Dose Informant Patient Reported? Taking?   EPINEPHrine (EPIPEN) 0.3 mg/0.3 mL SOAJ   No No   Sig: Inject 0.3 mL (0.3 mg total) into a muscle once for 1 dose   Insulin Pen Needle (BD ULTRA-FINE PEN NEEDLES) 29G X 12.7MM MISC   No No   Sig: USE 1 NEEDLE ONCE DAILY FOR INJECTION UNDER THE SKIN   amLODIPine (NORVASC) 10 mg tablet   No No   Sig: Take 1 tablet (10 mg total) by mouth daily   aspirin (ECOTRIN LOW STRENGTH) 81 mg EC tablet   No No   Sig: Take 1 tablet (81 mg total) by mouth daily   atorvastatin (LIPITOR) 80 mg tablet   No No   Sig: Take 1 tablet (80 mg total) by mouth daily   Patient not taking: Reported on 4/3/2024   carvedilol (COREG) 6.25 mg tablet   No No   Sig: Take 1 tablet (6.25 mg total) by mouth 2 (two) times a day with meals   fluticasone (FLONASE) 50 mcg/act nasal spray   No No   Si spray into each nostril 2 (two) times a day for 4 days   Patient not taking: Reported on 4/3/2024   glimepiride (AMARYL) 2 mg tablet   No No   Sig: Take 1 tablet (2 mg total) by mouth daily with breakfast   insulin glargine (LANTUS) 100 units/mL subcutaneous injection   No No   Sig: Inject 12 Units under the skin daily at bedtime Do not start before 2024.   lisinopril (ZESTRIL) 20 mg tablet   No  "No   Sig: Take 1 tablet (20 mg total) by mouth daily   metFORMIN (GLUCOPHAGE) 1000 MG tablet   No No   Sig: Take 1 tablet (1,000 mg total) by mouth 2 (two) times a day with meals      Facility-Administered Medications: None       Past Medical History:   Diagnosis Date    Acute hypoxic respiratory failure ([facility]) 10/07/2023    Arthritis     Breathing difficulty     Cellulitis 10/07/2023    Coronary artery disease     Diabetes mellitus ([facility])     Diabetic neuropathy ([facility]) 05/28/2021    Heart disease     CAD   s/p ptca with 1 stent 2012    Hidradenitis suppurativa     groin    History of transfusion     Hyperlipidemia     Hypertension     MI (myocardial infarction) ([facility])     \" silent \" M.I. in the past    Morbid obesity with BMI of 50.0-59.9, adult ([facility])     Nicotine dependence     Obesity     Pilonidal cyst     Type 1 non-ST elevation myocardial infarction (NSTEMI) ([facility]) 11/29/2020       Past Surgical History:   Procedure Laterality Date    CARDIAC SURGERY      CORONARY ANGIOPLASTY WITH STENT PLACEMENT      INCISION AND DRAINAGE OF WOUND N/A 1/24/2017    Procedure: INCISION AND DRAINAGE (I&D) BUTTOCK, PILONIDAL CYST;  Surgeon: Simba Adhikari MD;  Location: [facility];  Service:     LEG SURGERY Left     I&D of left leg 2012    NM DEBRIDEMENT SUBCUTANEOUS TISSUE 1ST 20 SQ CM/< Left 7/6/2021    Procedure: DEBRIDEMENT WOUND (WASH OUT);  Surgeon: Sudheer Mcfarlane MD;  Location: [facility];  Service: General    NM EXC B9 LESION MRGN XCP SK TG T/A/L >4.0 CM Left 4/6/2021    Procedure: EXCISION THIGH MASS;  Surgeon: Sudheer Mcfarlane MD;  [facility]: [facility];  Service: General    NM EXCISION H/P/P/U COMPLEX REPAIR Left 7/6/2021    Procedure: EXCISION PERINEAL ABSCESS LEFT THIGH;  Surgeon: Sudheer Mcfarlane MD;  [facility];  Service: General    NM NEGATIVE PRESSURE WOUND THERAPY DME <= 50 SQ CM Left 4/6/2021    Procedure: APPLICATION VAC DRESSING THIGH;  Surgeon: Sudheer Mcfarlane MD;  [facility];  Service: General    WOUND DEBRIDEMENT " Left 4/17/2021    Procedure: DEBRIDEMENT WOUND AND DRESSING CHANGE (WASH OUT);  Surgeon: Mahin Traore DO;  [facility];  Service: General    WOUND DEBRIDEMENT Left 4/22/2021    Procedure: DEBRIDEMENT LOWER EXTREMITY (WASH OUT), left groin and thigh;  Surgeon: Sudheer Mcfarlane MD;  [facility];  Service: General    WOUND DEBRIDEMENT Left 5/26/2021    Procedure: DEBRIDEMENT LOWER EXTREMITY (WASH OUT);  Surgeon: Zak Castanon DO;  [facility];  Service: General    WOUND DEBRIDEMENT Left 5/28/2021    Procedure: Washout left thigh wound and closure;  Surgeon: Amor Jaramillo DO;  [facility];  Service: General       Family History   Problem Relation Age of Onset    Heart disease Father     Diabetes Father     Hypertension Mother     Heart disease Mother      I have reviewed and agree with the history as documented.    E-Cigarette/Vaping    E-Cigarette Use Never User      E-Cigarette/Vaping Substances    Nicotine No     THC No     CBD No     Flavoring No      Social History     Tobacco Use    Smoking status: Former     Current packs/day: 0.00     Average packs/day: 1.5 packs/day for 20.0 years (29.9 ttl pk-yrs)     Types: Cigarettes     Start date: 01/2021     Quit date: 03/2021     Years since quitting: 3.0     Passive exposure: Past    Smokeless tobacco: Never   Vaping Use    Vaping status: Never Used   Substance Use Topics    Alcohol use: Not Currently     Comment: rarely    Drug use: No       Review of Systems   Constitutional:  Negative for chills and fever.   HENT:  Negative for hearing loss.    Eyes:  Negative for visual disturbance.   Respiratory:  Negative for shortness of breath.    Cardiovascular:  Negative for chest pain.   Gastrointestinal:  Negative for abdominal pain, constipation, diarrhea, nausea and vomiting.   Genitourinary:  Negative for difficulty urinating.   Musculoskeletal:  Negative for myalgias.   Skin:  Negative for rash.   Neurological:  Negative for  dizziness.   Psychiatric/Behavioral:  Negative for agitation.    All other systems reviewed and are negative.      Physical Exam  Physical Exam  Vitals and nursing note reviewed.   Constitutional:       General: He is not in acute distress.     Appearance: Normal appearance. He is not ill-appearing.   HENT:      Head: Normocephalic and atraumatic.      Right Ear: External ear normal.      Left Ear: External ear normal.      Nose: Nose normal. No congestion.      Mouth/Throat:      Mouth: Mucous membranes are moist.      Pharynx: Oropharynx is clear. No oropharyngeal exudate.   Eyes:      General:         Right eye: No discharge.         Left eye: No discharge.      Extraocular Movements: Extraocular movements intact.      Conjunctiva/sclera: Conjunctivae normal.      Pupils: Pupils are equal, round, and reactive to light.   Cardiovascular:      Rate and Rhythm: Normal rate and regular rhythm.      Pulses: Normal pulses.      Heart sounds: Normal heart sounds.   Pulmonary:      Effort: Pulmonary effort is normal. No respiratory distress.      Breath sounds: Normal breath sounds. No wheezing.   Abdominal:      General: Abdomen is flat. Bowel sounds are normal. There is no distension.      Palpations: Abdomen is soft.      Tenderness: There is no abdominal tenderness.   Musculoskeletal:         General: Swelling present. No deformity. Normal range of motion.      Cervical back: Normal range of motion. No rigidity.      Comments: Patient has swelling of the left lower extremity that is circumferential secondary to his lymphedema.  Left lower extremity is also hot to touch as well as erythematous.  Right lower extremity without any abnormalities.  Distal pulses intact.   Skin:     General: Skin is warm and dry.      Capillary Refill: Capillary refill takes less than 2 seconds.   Neurological:      General: No focal deficit present.      Mental Status: He is alert and oriented to person, place, and time. Mental status is  at baseline.      Cranial Nerves: No cranial nerve deficit.      Motor: No weakness.      Gait: Gait normal.   Psychiatric:         Mood and Affect: Mood normal.         Behavior: Behavior normal.         Vital Signs  ED Triage Vitals   Temperature Pulse Respirations Blood Pressure SpO2   04/05/24 0323 04/05/24 0319 04/05/24 0319 04/05/24 0319 04/05/24 0319   98.3 °F (36.8 °C) 83 18 151/70 95 %      Temp Source Heart Rate Source Patient Position - Orthostatic VS BP Location FiO2 (%)   04/05/24 0323 -- 04/05/24 0319 04/05/24 0319 --   Oral  Lying Right arm       Pain Score       04/05/24 0319       10 - Worst Possible Pain           Vitals:    04/05/24 0319   BP: 151/70   Pulse: 83   Patient Position - Orthostatic VS: Lying         Visual Acuity      ED Medications  Medications   cephalexin (KEFLEX) capsule 500 mg (500 mg Oral Given 4/5/24 0349)   oxyCODONE (ROXICODONE) IR tablet 5 mg (5 mg Oral Given 4/5/24 0349)       Diagnostic Studies  Results Reviewed       Procedure Component Value Units Date/Time    Fingerstick Glucose (POCT) [[id number]]  (Normal) Collected: 04/05/24 0329    Lab Status: Final result Specimen: Blood Updated: 04/05/24 0330     POC Glucose 104 mg/dl                    No orders to display              Procedures  Procedures         ED Course                               SBIRT 20yo+      Flowsheet Row Most Recent Value   Initial Alcohol Screen: US AUDIT-C     1. How often do you have a drink containing alcohol? 1 Filed at: 04/05/2024 0322   2. How many drinks containing alcohol do you have on a typical day you are drinking?  1 Filed at: 04/05/2024 0322   3a. Male UNDER 65: How often do you have five or more drinks on one occasion? 0 Filed at: 04/05/2024 0322   Audit-C Score 2 Filed at: 04/05/2024 0322   ALEX: How many times in the past year have you...    Used an illegal drug or used a prescription medication for non-medical reasons? Never Filed at: 04/05/2024 0322                      Medical  Decision Making  43-year-old male presenting to the ER for left lower leg swelling and erythema.  Differential includes worsening lymphedema versus cellulitis however given his examination with erythema as well as warmth and pain concern for possible cellulitis.  Will treat with Keflex.  Will give him first dose here.  Will send prescription for some Keflex.  Will give him a dose of oxycodone for pain.  Will send a prescription for Percocet but encouraged him to use Tylenol ibuprofen first.  Encouraged outpatient follow-up and patient was discharged home.  Return precautions discussed.    Amount and/or Complexity of Data Reviewed  Labs: ordered.    Risk  Prescription drug management.             Disposition  Final diagnoses:   Cellulitis of left lower extremity   Type 2 diabetes mellitus (HCC)   Lymphedema     Time reflects when diagnosis was documented in both MDM as applicable and the Disposition within this note       Time User Action Codes Description Comment    4/5/2024  3:36 AM Mitchell Clayton [L03.116] Cellulitis of left lower extremity     4/5/2024  3:36 AM Mitchell Clayton [E11.9] Type 2 diabetes mellitus (HCC)     4/5/2024  3:36 AM Mitchell Clayton [I89.0] Lymphedema           ED Disposition       ED Disposition   Discharge    Condition   Stable    Date/Time   Fri Apr 5, 2024 0339    Comment   Joaquin Frankel discharge to home/self care.                   Follow-up Information       Follow up With Specialties Details Why Contact Info Additional Information    Hany Mcfarland, DO Family Medicine Schedule an appointment as soon as possible for a visit  for follow up [address] [id number] [phone]       [facility] Emergency Department Emergency Medicine Go to  If symptoms worsen, As needed [address] [facility] [id number] [phone] [facility] Emergency Department, [address] [place] [address], [id number]             Discharge Medication List as of 4/5/2024  3:45 AM        START taking these medications    Details   cephalexin (KEFLEX) 500 mg capsule Take 1 capsule (500 mg total) by mouth every 6 (six) hours for 7 days, Starting Fri 4/5/2024, Until Fri 4/12/2024, Normal      oxyCODONE-acetaminophen (Percocet) 5-325 mg per tablet Take 1 tablet by mouth every 6 (six) hours as needed for moderate pain for up to 10 days Max Daily Amount: 4 tablets, Starting Fri 4/5/2024, Until Mon 4/15/2024 at 2359, Normal           CONTINUE these medications which have NOT CHANGED    Details   amLODIPine (NORVASC) 10 mg tablet Take 1 tablet (10 mg total) by mouth daily, Starting Mon 3/4/2024, Until Wed 4/3/2024, Normal      aspirin (ECOTRIN LOW STRENGTH) 81 mg EC tablet Take 1 tablet (81 mg total) by mouth daily, Starting Thu 12/3/2020, Normal      atorvastatin (LIPITOR) 80 mg tablet Take 1 tablet (80 mg total) by mouth daily, Starting Wed 2/28/2024, Normal      carvedilol (COREG) 6.25 mg tablet Take 1 tablet (6.25 mg total) by mouth 2 (two) times a day with meals, Starting Wed 2/28/2024, Normal      EPINEPHrine (EPIPEN) 0.3 mg/0.3 mL SOAJ Inject 0.3 mL (0.3 mg total) into a muscle once for 1 dose, Starting Tue 12/13/2022, Normal      fluticasone (FLONASE) 50 mcg/act nasal spray 1 spray into each nostril 2 (two) times a day for 4 days, Starting Sat 2/17/2024, Until Wed 2/21/2024, Normal      glimepiride (AMARYL) 2 mg tablet Take 1 tablet (2 mg total) by mouth daily with breakfast, Starting Wed 2/28/2024, Until Wed 4/3/2024, Normal      insulin glargine (LANTUS) 100 units/mL subcutaneous injection Inject 12 Units under the skin daily at bedtime Do not start before February 21, 2024., Starting Wed 2/21/2024, Normal      Insulin Pen Needle (BD ULTRA-FINE PEN NEEDLES) 29G X 12.7MM MISC USE 1 NEEDLE ONCE DAILY FOR INJECTION UNDER THE SKIN, Normal      lisinopril (ZESTRIL) 20 mg tablet Take 1 tablet (20 mg total) by mouth daily, Starting Mon  3/4/2024, Until Wed 4/3/2024, Normal      metFORMIN (GLUCOPHAGE) 1000 MG tablet Take 1 tablet (1,000 mg total) by mouth 2 (two) times a day with meals, Starting Wed 2/28/2024, Normal             No discharge procedures on file.    PDMP Review         Value Time User    PDMP Reviewed  Yes 2/28/2024  4:53 PM Tuesday PETRA Villalobos            ED Provider  Electronically Signed by             Mitchell Clayton MD  04/05/24 0604

## 2024-04-19 ENCOUNTER — CONSULT (OUTPATIENT)
Dept: SURGERY | Facility: CLINIC | Age: 44
End: 2024-04-19
Payer: COMMERCIAL

## 2024-04-19 VITALS
TEMPERATURE: 97.4 F | WEIGHT: 315 LBS | HEIGHT: 67 IN | SYSTOLIC BLOOD PRESSURE: 160 MMHG | BODY MASS INDEX: 49.44 KG/M2 | DIASTOLIC BLOOD PRESSURE: 84 MMHG | HEART RATE: 73 BPM | OXYGEN SATURATION: 94 %

## 2024-04-19 DIAGNOSIS — E66.01 MORBID OBESITY WITH BODY MASS INDEX (BMI) OF 60.0 TO 69.9 IN ADULT (HCC): ICD-10-CM

## 2024-04-19 DIAGNOSIS — E11.9 INSULIN DEPENDENT TYPE 2 DIABETES MELLITUS (HCC): ICD-10-CM

## 2024-04-19 DIAGNOSIS — L73.2 HIDRADENITIS SUPPURATIVA: ICD-10-CM

## 2024-04-19 DIAGNOSIS — I10 ESSENTIAL HYPERTENSION: ICD-10-CM

## 2024-04-19 DIAGNOSIS — I89.0 LYMPHEDEMA: ICD-10-CM

## 2024-04-19 DIAGNOSIS — Z79.4 INSULIN DEPENDENT TYPE 2 DIABETES MELLITUS (HCC): ICD-10-CM

## 2024-04-19 DIAGNOSIS — L72.0 EPIDERMOID CYST OF SKIN OF SCALP: Primary | ICD-10-CM

## 2024-04-19 DIAGNOSIS — G47.34 NOCTURNAL HYPOXIA: ICD-10-CM

## 2024-04-19 DIAGNOSIS — I25.10 CAD (CORONARY ARTERY DISEASE): ICD-10-CM

## 2024-04-19 PROCEDURE — 99204 OFFICE O/P NEW MOD 45 MIN: CPT | Performed by: SPECIALIST

## 2024-04-19 RX ORDER — CLINDAMYCIN HYDROCHLORIDE 150 MG/1
150 CAPSULE ORAL EVERY 8 HOURS SCHEDULED
Qty: 21 CAPSULE | Refills: 0 | Status: SHIPPED | OUTPATIENT
Start: 2024-04-19 | End: 2024-04-26

## 2024-04-19 NOTE — PROGRESS NOTES
Surgical consult and history and Physical Examination - General Surgery   [facility]  Joaquin Frankel 43 y.o. male MRN: [id number]  : [id number] PCP: Hany Mcfarland DO    History of Present Illness   Chief Complaint: I have a cyst on the scalp for more than a year right side  And had adenitis suppurativa of anterior abdominal wall/panniculitis undergoing treatment by dermatologist    HPI:  Joaquin Frankel is a 43 y.o. male who presents to office with history of for insulin-dependent diabetes mellitus coronary artery disease as surgery and angioplasty patient is morbidly morbid obese difficult anesthesia and difficult to get extubated.    Noted to have a cyst over the scalp which is there for more than a year wants to have it removed there is no signs of any infection or problem    Patient has a folliculitis and hidradenitis suppurative of anterior abdominal wall which was extending to most of his lower abdominal wall with chronic antibiotics by dermatologist is now localized and still it still large but is localized to left side of his abdominal wall near umbilicus    Patient was referred to us for possible surgical intervention  Patient is scheduled to see weight management team for weight reduction treatment  Patient has changed his primary care physician and blood works are pending including hemoglobin A1c    Denies any fever chills rigors abdominal pain nausea vomiting diarrhea constipation  Able to lay down flat without any respiratory distress patient is not on his CPAP but his schedule to see pulmonologist     Patient has developed left lower extremity lymphedema patient attributes this to multiple debridement and surgery on the left leg.    Historical Information   The following portions of the patient's history were reviewed and updated as appropriate  Past Medical History:   Diagnosis Date    Acute hypoxic respiratory failure (HCC) 10/07/2023    Arthritis     Breathing  "difficulty     Cellulitis 10/07/2023    Coronary artery disease     Diabetes mellitus (HCC)     Diabetic neuropathy ([facility]) 05/28/2021    Heart disease     CAD   s/p ptca with 1 stent 2012    Hidradenitis suppurativa     groin    History of transfusion     Hyperlipidemia     Hypertension     MI (myocardial infarction) ([facility])     \" silent \" M.I. in the past    Morbid obesity with BMI of 50.0-59.9, adult ([facility])     Nicotine dependence     Obesity     Pilonidal cyst     Type 1 non-ST elevation myocardial infarction (NSTEMI) ([facility]) 11/29/2020     Past Surgical History:   Procedure Laterality Date    CARDIAC SURGERY      CORONARY ANGIOPLASTY WITH STENT PLACEMENT      INCISION AND DRAINAGE OF WOUND N/A 1/24/2017    Procedure: INCISION AND DRAINAGE (I&D) BUTTOCK, PILONIDAL CYST;  Surgeon: Simba Adhikari MD;  Location: [facility];  Service:     LEG SURGERY Left     I&D of left leg 2012    DC DEBRIDEMENT SUBCUTANEOUS TISSUE 1ST 20 SQ CM/< Left 7/6/2021    Procedure: DEBRIDEMENT WOUND (WASH OUT);  Surgeon: Sudheer Mcfarlane MD;  Location: [facility];  Service: General    DC EXC B9 LESION MRGN XCP SK TG T/A/L >4.0 CM Left 4/6/2021    Procedure: EXCISION THIGH MASS;  Surgeon: Sudheer Mcfarlane MD;  [facility]: [facility];  Service: General    DC EXCISION H/P/P/U COMPLEX REPAIR Left 7/6/2021    Procedure: EXCISION PERINEAL ABSCESS LEFT THIGH;  Surgeon: Sudheer Mcfarlane MD;  [facility];  Service: General    DC NEGATIVE PRESSURE WOUND THERAPY DME <= 50 SQ CM Left 4/6/2021    Procedure: APPLICATION VAC DRESSING THIGH;  Surgeon: Sudheer Mcfarlane MD;  [facility];  Service: General    WOUND DEBRIDEMENT Left 4/17/2021    Procedure: DEBRIDEMENT WOUND AND DRESSING CHANGE (WASH OUT);  Surgeon: Mahin Traore DO;  [facility];  Service: General    WOUND DEBRIDEMENT Left 4/22/2021    Procedure: DEBRIDEMENT LOWER EXTREMITY (WASH OUT), left groin and thigh;  Surgeon: Sudheer Mcfarlane MD;  [facility];  Service: General    WOUND DEBRIDEMENT " Left 5/26/2021    Procedure: DEBRIDEMENT LOWER EXTREMITY (WASH OUT);  Surgeon: Zak Castanon DO;  [facility];  Service: General    WOUND DEBRIDEMENT Left 5/28/2021    Procedure: Washout left thigh wound and closure;  Surgeon: Amor Jaramillo DO;  [facility]: [facility];  Service: General     Social History   Social History     Substance and Sexual Activity   Alcohol Use Not Currently    Comment: rarely     Social History     Substance and Sexual Activity   Drug Use No     Social History     Tobacco Use   Smoking Status Former    Current packs/day: 0.00    Average packs/day: 1.5 packs/day for 20.0 years (29.9 ttl pk-yrs)    Types: Cigarettes    Start date: 01/2021    Quit date: 03/2021    Years since quitting: 3.1    Passive exposure: Past   Smokeless Tobacco Never     Family History:   Family History   Problem Relation Age of Onset    Heart disease Father     Diabetes Father     Hypertension Mother     Heart disease Mother        Meds/Allergies   Allergies   Allergen Reactions    Keflex [Cephalexin] Facial Swelling and Lip Swelling    Amoxicillin Hives     childhood       Current Outpatient Medications:     amLODIPine (NORVASC) 10 mg tablet, Take 1 tablet (10 mg total) by mouth daily, Disp: 30 tablet, Rfl: 3    aspirin (ECOTRIN LOW STRENGTH) 81 mg EC tablet, Take 1 tablet (81 mg total) by mouth daily, Disp: 30 tablet, Rfl: 0    atorvastatin (LIPITOR) 80 mg tablet, Take 1 tablet (80 mg total) by mouth daily, Disp: 90 tablet, Rfl: 3    carvedilol (COREG) 6.25 mg tablet, Take 1 tablet (6.25 mg total) by mouth 2 (two) times a day with meals, Disp: 60 tablet, Rfl: 3    EPINEPHrine (EPIPEN) 0.3 mg/0.3 mL SOAJ, Inject 0.3 mL (0.3 mg total) into a muscle once for 1 dose, Disp: 0.6 mL, Rfl: 0    glimepiride (AMARYL) 2 mg tablet, Take 1 tablet (2 mg total) by mouth daily with breakfast, Disp: 30 tablet, Rfl: 3    insulin glargine (LANTUS) 100 units/mL subcutaneous injection, Inject 12 Units under the skin  "daily at bedtime Do not start before February 21, 2024., Disp: 10 mL, Rfl: 0    Insulin Pen Needle (BD ULTRA-FINE PEN NEEDLES) 29G X 12.7MM MISC, USE 1 NEEDLE ONCE DAILY FOR INJECTION UNDER THE SKIN, Disp: 50 each, Rfl: 3    lisinopril (ZESTRIL) 20 mg tablet, Take 1 tablet (20 mg total) by mouth daily, Disp: 30 tablet, Rfl: 3    metFORMIN (GLUCOPHAGE) 1000 MG tablet, Take 1 tablet (1,000 mg total) by mouth 2 (two) times a day with meals, Disp: 180 tablet, Rfl: 3    fluticasone (FLONASE) 50 mcg/act nasal spray, 1 spray into each nostril 2 (two) times a day for 4 days (Patient not taking: Reported on 4/3/2024), Disp: 9.9 mL, Rfl: 0     REVIEW OF SYSTEMS  Constitutional:  Denies fever or chills   Eyes:  Denies change in visual acuity   HENT:  Denies nasal congestion or sore throat   Respiratory:  Denies cough or shortness of breath   Quit smoking but has smoked for good 20 years  Cardiovascular:  Denies chest pain   Extensive history of hypertension on 3 medications  As cardiac surgery and stent placement for CAD  GI:  Denies abdominal pain, nausea, vomiting, bloody stools or diarrhea   :  Denies dysuria, frequency, difficulty in micturition and nocturia  Musculoskeletal:  Denies back pain or joint pain   Neurologic:  Denies headache, focal weakness or sensory changes   Endocrine:  Denies polyuria or polydipsia   On insulin and multiple oral antidiabetic medication  Lymphatic:  Denies swollen glands   Psychiatric:  Denies depression or anxiety     Objective   Current Vitals:   /84 (BP Location: Left arm, Patient Position: Sitting, Cuff Size: Large)   Pulse 73   Temp (!) 97.4 °F (36.3 °C) (Core)   Ht 5' 7\" (1.702 m)   Wt (!) 181 kg (399 lb 6.4 oz)   SpO2 94%   BMI 62.55 kg/m²   Body mass index is 62.55 kg/m².     PHYSICAL EXAMS  General:  Patient is not in acute distress, laying in the bed comfortably, awake, alert responding to commands, morbidly morbid obese with BMI 62.55  HEENT:  Both pupils " normal-size atraumatic, normocephalic, nonicteric  2 cm dermoid cyst right side of frontoparietal area with no signs of inflammation redness pain or tenderness  Neck:  JVP not raised. Trachea central  As per patient his difficult intubation after the cardiac surgery he was on the vent for long  Respiratory:  normal Breath sounds clear to auscultation,  Cardiovascular:  S1-S2 normal without any murmur   GI: Difficult abdominal examination secondary to patient's body habitus abdomen soft nontender.  Left side below the umbilicus near the pannus patient has 8 inches x 4 inches x 3 inches deep necrosis fat hard to firm consistency with multiple healed sinuses   musculoskeletal:  No back pain  Integument:  No skin rashes or ulceration  Lymphatic:  No cervical lmphadenopathy patient has lymphedema left lower extremity  Neurologic:  Patient is awake alert, responding to command, well-oriented to time and place and person moving all extremities ambulating well    Visit Diagnosis: . Diagnoses and all orders for this visit:    Epidermoid cyst of skin of scalp    Hidradenitis suppurativa  -     Ambulatory referral to General Surgery    Morbid obesity with body mass index (BMI) of 60.0 to 69.9 in adult (HCC)    Insulin dependent type 2 diabetes mellitus (HCC)    Nocturnal hypoxia    CAD (coronary artery disease)    Essential hypertension    Lymphedema       Plan of care was discussed with patient in detail    Pertinent labs reviewed  Pertinent images and available reads personally reviewed  No results found.  Pertinent notes reviewed    Assessment/Plan   Assessment:  Dermoid cyst right side of the scalp  Hidradenitis suppurativa/panniculitis anterior abdominal wall  Morbid obesity  Insulin-dependent diabetes mellitus  Coronary artery disease  Difficult intubation    Symptomatic   Encounter Diagnoses   Name Primary?    Hidradenitis suppurativa     Epidermoid cyst of skin of scalp Yes    Morbid obesity with body mass index (BMI)  of 60.0 to 69.9 in adult (HCC)     Insulin dependent type 2 diabetes mellitus (HCC)     Nocturnal hypoxia     CAD (coronary artery disease)     Essential hypertension     Lymphedema     .    Plan:  The risks, benefits, alternatives,and probabilities of success were discussed in detail with no guarantee made as to outcome. All questions were answered to the patient's satisfaction.     Advised cyst excision under local anesthesia if hemoglobin A1c is within normal limit  Advised to see weight management team for possible weight reduction    Advised follow-up with the plastic surgery for wide excision of hidradenitis/panniculitis anterior abdominal wall under anesthesia    Follow-up in 4 weeks for cyst excision under local anesthesia from scalp  Patient want to have antibiotics as he was treated with antibiotics by his dermatologist for hidradenitis suppurativa    Clindamycin 1 tablet 3 times a day    Counseling / Coordination of Care  Expained patient  in detailed about coordination of care. A description of the counseling / coordination of care:  I performed an interim history, pertinent images and labs, performed a physical examination to arrive at the plan delineated above with associated thought processes.       Dr Krishna Ramirez MD MS [facility] FACS  [facility]    Office   Tel.  [phone]  Fax. [phone]

## 2024-04-25 ENCOUNTER — RA CDI HCC (OUTPATIENT)
Dept: OTHER | Facility: HOSPITAL | Age: 44
End: 2024-04-25

## 2024-04-25 NOTE — PROGRESS NOTES
HCC coding opportunities       Chart reviewed, no opportunity found: CHART REVIEWED, NO OPPORTUNITY FOUND        Patients Insurance        Commercial Insurance: [employer]

## 2024-04-30 DIAGNOSIS — E11.22 CKD STAGE 2 DUE TO TYPE 2 DIABETES MELLITUS  (HCC): Primary | ICD-10-CM

## 2024-04-30 DIAGNOSIS — N18.2 CKD STAGE 2 DUE TO TYPE 2 DIABETES MELLITUS  (HCC): Primary | ICD-10-CM

## 2024-04-30 LAB
ALBUMIN SERPL-MCNC: 3.3 G/DL (ref 4.1–5.1)
ALBUMIN/CREAT UR: 5258 MG/G CREAT (ref 0–29)
ALBUMIN/GLOB SERPL: 0.8 {RATIO} (ref 1.2–2.2)
ALP SERPL-CCNC: 113 IU/L (ref 44–121)
ALT SERPL-CCNC: 10 IU/L (ref 0–44)
AST SERPL-CCNC: 14 IU/L (ref 0–40)
BILIRUB SERPL-MCNC: 0.3 MG/DL (ref 0–1.2)
BUN SERPL-MCNC: 40 MG/DL (ref 6–24)
BUN/CREAT SERPL: 13 (ref 9–20)
CALCIUM SERPL-MCNC: 8.8 MG/DL (ref 8.7–10.2)
CHLORIDE SERPL-SCNC: 104 MMOL/L (ref 96–106)
CHOLEST SERPL-MCNC: 184 MG/DL (ref 100–199)
CO2 SERPL-SCNC: 22 MMOL/L (ref 20–29)
CREAT SERPL-MCNC: 2.98 MG/DL (ref 0.76–1.27)
CREAT UR-MCNC: 78.8 MG/DL
EGFR: 26 ML/MIN/1.73
GLOBULIN SER-MCNC: 4.1 G/DL (ref 1.5–4.5)
GLUCOSE SERPL-MCNC: 88 MG/DL (ref 70–99)
HBA1C MFR BLD: 6.6 % (ref 4.8–5.6)
HDLC SERPL-MCNC: 28 MG/DL
LDLC SERPL CALC-MCNC: 121 MG/DL (ref 0–99)
MICROALBUMIN UR-MCNC: 4143 UG/ML
MICRODELETION SYND BLD/T FISH: NORMAL
MICRODELETION SYND BLD/T FISH: NORMAL
POTASSIUM SERPL-SCNC: 4.7 MMOL/L (ref 3.5–5.2)
PROT SERPL-MCNC: 7.4 G/DL (ref 6–8.5)
SODIUM SERPL-SCNC: 141 MMOL/L (ref 134–144)
TRIGL SERPL-MCNC: 198 MG/DL (ref 0–149)

## 2024-04-30 PROCEDURE — 3044F HG A1C LEVEL LT 7.0%: CPT | Performed by: SPECIALIST

## 2024-05-03 ENCOUNTER — OFFICE VISIT (OUTPATIENT)
Dept: FAMILY MEDICINE CLINIC | Facility: CLINIC | Age: 44
End: 2024-05-03
Payer: COMMERCIAL

## 2024-05-03 VITALS
BODY MASS INDEX: 49.44 KG/M2 | SYSTOLIC BLOOD PRESSURE: 136 MMHG | HEIGHT: 67 IN | RESPIRATION RATE: 18 BRPM | TEMPERATURE: 98 F | DIASTOLIC BLOOD PRESSURE: 70 MMHG | WEIGHT: 315 LBS | HEART RATE: 80 BPM

## 2024-05-03 DIAGNOSIS — I89.0 LYMPHEDEMA: ICD-10-CM

## 2024-05-03 DIAGNOSIS — E66.01 MORBID OBESITY WITH BODY MASS INDEX (BMI) OF 60.0 TO 69.9 IN ADULT (HCC): ICD-10-CM

## 2024-05-03 DIAGNOSIS — E11.9 INSULIN DEPENDENT TYPE 2 DIABETES MELLITUS (HCC): Primary | ICD-10-CM

## 2024-05-03 DIAGNOSIS — N18.4 CKD (CHRONIC KIDNEY DISEASE) STAGE 4, GFR 15-29 ML/MIN (HCC): ICD-10-CM

## 2024-05-03 DIAGNOSIS — I25.10 CORONARY ARTERY DISEASE INVOLVING NATIVE CORONARY ARTERY OF NATIVE HEART WITHOUT ANGINA PECTORIS: ICD-10-CM

## 2024-05-03 DIAGNOSIS — Z91.199 NONCOMPLIANCE WITH DIET AND MEDICATION REGIMEN: ICD-10-CM

## 2024-05-03 DIAGNOSIS — E78.49 OTHER HYPERLIPIDEMIA: ICD-10-CM

## 2024-05-03 DIAGNOSIS — Z91.148 NONCOMPLIANCE WITH DIET AND MEDICATION REGIMEN: ICD-10-CM

## 2024-05-03 DIAGNOSIS — Z79.4 INSULIN DEPENDENT TYPE 2 DIABETES MELLITUS (HCC): Primary | ICD-10-CM

## 2024-05-03 DIAGNOSIS — N39.9 URINARY PROBLEM IN MALE: ICD-10-CM

## 2024-05-03 PROCEDURE — 99215 OFFICE O/P EST HI 40 MIN: CPT | Performed by: FAMILY MEDICINE

## 2024-05-03 NOTE — PROGRESS NOTES
Assessment/Plan:    No problem-specific Assessment & Plan notes found for this encounter.    CLOVIS, now at CKD4  Advised hydration  Pt states he has a urine outflow issue? Feels like he does not urinate as he should  Refer to urology  Hydrate  Stay off  of metformin due to CK4  Recheck pending of creatinine    DM2 on insulin  Suggest he keep appt with endocrinology    Flow issues/concerns, see urology    Obesity aware    HLD  Back on statin per pt  LDL reviewed, over 100  Recheck lipid levels in the future    Hidradenitis  Not currently infected but risks aware  Consider removal by plastic or general surgeon    CAD, f/u regularly with cardiology    Left leg lymphedema unchanged    Bmi noted, wt loss recommended    Please stay off the metformin until your kidneys are normal enough to restart it.  Please stay on the cholesterol medication for cholesterol and risk reduction.  Medications such as ibuprofen and naproxen should be avoided to prevent kidney harm.       Diagnoses and all orders for this visit:    Insulin dependent type 2 diabetes mellitus (HCC)    CKD (chronic kidney disease) stage 4, GFR 15-29 ml/min (HCC)    Morbid obesity with body mass index (BMI) of 60.0 to 69.9 in adult (HCC)    Urinary problem in male  -     Ambulatory referral to Urology; Future    Other hyperlipidemia  -     Comprehensive metabolic panel; Future  -     Lipid Panel with Direct LDL reflex; Future    Lymphedema    Noncompliance with diet and medication regimen    Coronary artery disease involving native coronary artery of native heart without angina pectoris        Lab Results   Component Value Date    HGBA1C 6.6 (H) 04/29/2024    HGBA1C 12.1 (H) 01/26/2024    HGBA1C 11.9 (H) 10/07/2023     Lab Results   Component Value Date    GLUF 157 (H) 01/26/2024    LDLCALC 121 (H) 04/29/2024    CREATININE 2.98 (H) 04/29/2024       Return in about 6 months (around 11/3/2024) for Recheck.    Subjective:      Patient ID: Joaquin Frankel is a 43  "y.o. male.    Chief Complaint   Patient presents with    Follow-up     Liliya Olmedo CMA         HPI  Started lipitor 80mg since was not before labs  Been off metformin since before labs  Trying to eat better  Saw general surgeon for hidradenitis who referred to plastic surgeon    The following portions of the patient's history were reviewed and updated as appropriate: allergies, current medications, past family history, past medical history, past social history, past surgical history and problem list.    Review of Systems   Constitutional:  Negative for chills and fever.   Respiratory:  Negative for shortness of breath.    Cardiovascular:  Positive for leg swelling. Negative for chest pain.         Current Outpatient Medications   Medication Sig Dispense Refill    amLODIPine (NORVASC) 10 mg tablet Take 1 tablet (10 mg total) by mouth daily 30 tablet 3    aspirin (ECOTRIN LOW STRENGTH) 81 mg EC tablet Take 1 tablet (81 mg total) by mouth daily 30 tablet 0    atorvastatin (LIPITOR) 80 mg tablet Take 1 tablet (80 mg total) by mouth daily 90 tablet 3    carvedilol (COREG) 6.25 mg tablet Take 1 tablet (6.25 mg total) by mouth 2 (two) times a day with meals 60 tablet 3    EPINEPHrine (EPIPEN) 0.3 mg/0.3 mL SOAJ Inject 0.3 mL (0.3 mg total) into a muscle once for 1 dose 0.6 mL 0    glimepiride (AMARYL) 2 mg tablet Take 1 tablet (2 mg total) by mouth daily with breakfast 30 tablet 3    insulin glargine (LANTUS) 100 units/mL subcutaneous injection Inject 12 Units under the skin daily at bedtime Do not start before February 21, 2024. 10 mL 0    Insulin Pen Needle (BD ULTRA-FINE PEN NEEDLES) 29G X 12.7MM MISC USE 1 NEEDLE ONCE DAILY FOR INJECTION UNDER THE SKIN 50 each 3    lisinopril (ZESTRIL) 20 mg tablet Take 1 tablet (20 mg total) by mouth daily 30 tablet 3     No current facility-administered medications for this visit.       Objective:    /70   Pulse 80   Temp 98 °F (36.7 °C)   Resp 18   Ht 5' 7\" (1.702 m)   " Wt (!) 180 kg (396 lb)   BMI 62.02 kg/m²        Physical Exam  Vitals and nursing note reviewed.   Constitutional:       General: He is not in acute distress.     Appearance: He is well-developed. He is obese. He is not ill-appearing.   HENT:      Head: Normocephalic.      Right Ear: Tympanic membrane normal.      Left Ear: Tympanic membrane normal.   Eyes:      General: No scleral icterus.     Conjunctiva/sclera: Conjunctivae normal.   Cardiovascular:      Rate and Rhythm: Normal rate and regular rhythm.      Heart sounds: No murmur heard.  Pulmonary:      Effort: Pulmonary effort is normal. No respiratory distress.      Breath sounds: No wheezing.   Abdominal:      Palpations: Abdomen is soft. There is no mass.      Tenderness: There is no abdominal tenderness.   Musculoskeletal:         General: No deformity.      Cervical back: Neck supple.      Right lower leg: No edema.      Left lower leg: Edema present.   Skin:     General: Skin is warm and dry.      Coloration: Skin is not pale.      Findings: Lesion present.      Comments: Large cystic growth LLQ   Neurological:      Mental Status: He is alert.      Motor: No weakness.      Gait: Gait normal.   Psychiatric:         Mood and Affect: Mood normal.         Behavior: Behavior normal.         Thought Content: Thought content normal.         41 minutes spent with patient and with chart review and documentation completion.         Hany Mcfarland DO

## 2024-05-03 NOTE — PATIENT INSTRUCTIONS
Please stay off the metformin until your kidneys are normal enough to restart it.  Please stay on the cholesterol medication for cholesterol and risk reduction.  Medications such as ibuprofen and naproxen should be avoided to prevent kidney harm.

## 2024-05-15 NOTE — CASE MANAGEMENT
Discussed patient's care with Lana Zuleta- surgery  Plan is for patent to follow up at [address] next [facility] 4/28  Referral made to [facility] for SN and PT  They will need to teach wound care to someone since they cannot make daily visits  Patient upset by this  He is unable to do the wound care and does not want his 19yo son to provide the care  Informed by Margy Norris that patient is not cleared for dc home today  Sent [facility] message and left vm for [facility] of delayed dc  47F s/p b/s R foot dorsal medial mass incision & drainage (5/11)  - Patient seen and evaluated  - Afebrile, no leukocytosis   - 5/11 s/p b/s Right foot dorsal medial non-mobile mass incision and drainage, closed, sutures intact, central mass wound to dermis, hyperpigmented periwound, no fluctuance, no pus, no bogginess, no malodor, no periwound erythema.  Left lateral ankle wound to subQ, no tracking/ tunnelling/ signs of infection, small mobile soft tissue mass at subQ level, no pain on palpation   - R foot culture pending    - Right foot XR: no OM, no gas  - Right foot CT:  abscess within the dorsal medial soft tissues, clinically no fluctuance, no purulence expressed today.   - R foot MR pending   - R foot not likely source of fever, please rule out other sources   - STRICT decubitus precautions, Z- FLOWS boots at all time when in bed and in chair resting.   - Pod plan local wound care pending R foot MR  - Discussed with attending

## 2024-05-16 ENCOUNTER — TELEPHONE (OUTPATIENT)
Dept: SURGERY | Facility: CLINIC | Age: 44
End: 2024-05-16

## 2024-05-16 ENCOUNTER — PROCEDURE VISIT (OUTPATIENT)
Dept: SURGERY | Facility: CLINIC | Age: 44
End: 2024-05-16
Payer: COMMERCIAL

## 2024-05-16 VITALS
DIASTOLIC BLOOD PRESSURE: 72 MMHG | WEIGHT: 315 LBS | OXYGEN SATURATION: 94 % | BODY MASS INDEX: 49.44 KG/M2 | SYSTOLIC BLOOD PRESSURE: 144 MMHG | TEMPERATURE: 97 F | HEART RATE: 59 BPM | HEIGHT: 67 IN

## 2024-05-16 DIAGNOSIS — L72.0 EPIDERMOID CYST OF SKIN OF SCALP: Primary | ICD-10-CM

## 2024-05-16 DIAGNOSIS — E66.01 MORBID OBESITY WITH BODY MASS INDEX (BMI) OF 60.0 TO 69.9 IN ADULT (HCC): ICD-10-CM

## 2024-05-16 DIAGNOSIS — I10 ESSENTIAL HYPERTENSION: ICD-10-CM

## 2024-05-16 DIAGNOSIS — Z79.4 INSULIN DEPENDENT TYPE 2 DIABETES MELLITUS (HCC): ICD-10-CM

## 2024-05-16 DIAGNOSIS — E11.9 INSULIN DEPENDENT TYPE 2 DIABETES MELLITUS (HCC): ICD-10-CM

## 2024-05-16 PROCEDURE — 88304 TISSUE EXAM BY PATHOLOGIST: CPT | Performed by: PATHOLOGY

## 2024-05-16 PROCEDURE — 11424 EXC H-F-NK-SP B9+MARG 3.1-4: CPT | Performed by: SPECIALIST

## 2024-05-16 RX ORDER — CLINDAMYCIN HYDROCHLORIDE 150 MG/1
300 CAPSULE ORAL EVERY 8 HOURS SCHEDULED
Qty: 30 CAPSULE | Refills: 0 | Status: SHIPPED | OUTPATIENT
Start: 2024-05-16 | End: 2024-05-21

## 2024-05-16 NOTE — TELEPHONE ENCOUNTER
Called and left patient a message to call back.  Has appointment for today at 4 PM, called to see if patient can come at a earlier time.

## 2024-05-16 NOTE — PROGRESS NOTES
"Skin excision    Date/Time: 5/16/2024 4:00 PM    Performed by: Krishna Ramirez MD  Authorized by: Krishna Ramirez MD  Universal Protocol:  Consent: Verbal consent obtained.  Risks and benefits: risks, benefits and alternatives were discussed  Consent given by: patient  Time out: Immediately prior to procedure a \"time out\" was called to verify the correct patient, procedure, equipment, support staff and site/side marked as required.  Timeout called at: 5/16/2024 4:15 PM.  Patient understanding: patient states understanding of the procedure being performed  Patient consent: the patient's understanding of the procedure matches consent given  Procedure consent: procedure consent matches procedure scheduled  Relevant documents: relevant documents present and verified  Test results: test results available and properly labeled  Site marked: the operative site was marked  Patient identity confirmed: verbally with patient    Procedure Details - Skin Excision:     Number of Lesions:  1  Lesion 1:     Body area:  Head/neck    Head/neck location:  Scalp    Malignancy: benign lesion        Initial size (mm):  25        Final defect size (mm):  35    Destruction method: scissors used for extraction    Repair type:  Linear closure    Closure complexity: simple      Surgical defect:  3.5 cm    Repair size (cm):  3.5    Repair size (sq cm):  3.5     Repair Comments: Site was marked  Area was cleansed with ChloraPrep  10 cc of lidocaine with epinephrine was injected around the punctum for field block  Elliptical incision was made encircling the punctum and the cyst  Skin subtenons tissue was cut and the whole cyst was excised in toto    Resultant wound was then approximated with the interrupted nylon 3-0 sutures  5 sutures were placed    At the end of procedure no active bleeding was noted  Patient tolerated procedure well remained stable during and after procedure      Lesion 6:      Patient was advised to watch for bleeding  Apply " pressure to control bleeding if bleeding does not stop come to the ER    Ice pack  Advised that patient may develop black eye or bruising around the ear or right side of the face  Which will be temporary and will resolve in due course of time    Follow-up in 2 weeks for removal of sutures  Call MD if drainage/cellulitis or pain  Take Tylenol/Motrin for pain  Clindamycin 300 mg 1 capsule 8 hourly for 5 days  Hold if diarrhea

## 2024-05-21 NOTE — PROGRESS NOTES
Progress Note - Cardiology Office  [facility]    Joaquin Frankel 44 y.o. male MRN: [id number]  : 1980  Encounter: [id number]      ASSESSMENT:   S/P NSTEMI 2020, peak troponin was 0.72  Had catheterization and PCI/SABINE of RCA on 2020     Patient complains of chest pressure and dyspnea on exertion     Coronary artery disease,   S/p NSTEMI 2020, peak troponin was 0.72  s/p PCI of RCA on 2020,  History of PCI of LCX in   On aspirin, Brilinta, atorvastatin, lisinopril, Coreg     ECHO:  20 20:  EF 60%     Hypertension:    BP is 118/82 mmHg with heart rate of 70  On amlodipine, Coreg, lisinopril     Hyperlipidemia:  Target LDL is< 70  Currently on atorvastatin 80 mg  2024: , , HDL 28, normal AST and ALT    Patient had stopped taking his cholesterol medicines which have been recently resumed by his PCP and he is going to get repeat lipid profile after which we will decide if his lipid-lowering therapy has to be readjusted or not     Type 2 diabetes mellitus:  Managed by PCP     History of Left groin cellulitis/mass:  S/p excision of left groin mass with skin grafting     Chronic Tobacco Abuse  Quit Smoking!     Morbid obesity:  BMI is 60.10-> 65.13(417 lbs)    Chronic lymphedema    Noncompliant with medications     RECOMMENDATIONS:  Echocardiogram  Pharmacologic nuclear stress test  Continue aspirin, atorvastatin, Coreg, amlodipine and lisinopril.    Again advised on regular cardiovascular exercise diet and weight loss  Again re-emphasized to seek help from weight loss center.    Patient does not wish to have gastric bypass  Patient is getting repeat lipid profile with PCP      Please call [phone] if any questions.    HPI :     Joaquin Frankel is a 44 y.o. year old male who came for follow up.  He has gained more weight and his BMI is more than 65 and with 417 pounds now.  He has an appointment with weight loss center in the next  "coming weeks.  His lipid profile is abnormal and he states that he stopped taking his cholesterol medications which have been resumed by his PCP who is going to get a repeat lipid profile before adjusting his medications.  Patient also complains of exertional dyspnea and chest pressure    REVIEW OF SYSTEMS:  Review of Systems   Respiratory:  Positive for chest tightness and shortness of breath.    Cardiovascular:  Positive for leg swelling.   All other systems reviewed and are negative.        Historical Information   Past Medical History:   Diagnosis Date    Acute hypoxic respiratory failure ([facility]) 10/07/2023    Arthritis     Breathing difficulty     Cellulitis 10/07/2023    Coronary artery disease     Diabetes mellitus ([facility])     Diabetic neuropathy ([facility]) 05/28/2021    Heart disease     CAD   s/p ptca with 1 stent 2012    Hidradenitis suppurativa     groin    History of transfusion     Hyperlipidemia     Hypertension     MI (myocardial infarction) ([facility])     \" silent \" M.I. in the past    Morbid obesity with BMI of 50.0-59.9, adult ([facility])     Nicotine dependence     Obesity     Pilonidal cyst     Type 1 non-ST elevation myocardial infarction (NSTEMI) ([facility]) 11/29/2020     Past Surgical History:   Procedure Laterality Date    CARDIAC SURGERY      CORONARY ANGIOPLASTY WITH STENT PLACEMENT      INCISION AND DRAINAGE OF WOUND N/A 1/24/2017    Procedure: INCISION AND DRAINAGE (I&D) BUTTOCK, PILONIDAL CYST;  Surgeon: Simba Adhikari MD;  Location: [facility];  Service:     LEG SURGERY Left     I&D of left leg 2012    VA DEBRIDEMENT SUBCUTANEOUS TISSUE 1ST 20 SQ CM/< Left 7/6/2021    Procedure: DEBRIDEMENT WOUND (WASH OUT);  Surgeon: Sudheer Mcfarlane MD;  [facility]: [facility];  Service: General    VA EXC B9 LESION MRGN XCP SK TG T/A/L >4.0 CM Left 4/6/2021    Procedure: EXCISION THIGH MASS;  Surgeon: Sudheer Mcfarlane MD;  [facility];  Service: General    VA EXCISION H/P/P/U COMPLEX REPAIR Left 7/6/2021    Procedure: EXCISION " PERINEAL ABSCESS LEFT THIGH;  Surgeon: Sudheer Mcfarlane MD;  [facility];  Service: General    MT NEGATIVE PRESSURE WOUND THERAPY DME <= 50 SQ CM Left 4/6/2021    Procedure: APPLICATION VAC DRESSING THIGH;  Surgeon: Sudheer Mcfarlane MD;  [facility];  Service: General    WOUND DEBRIDEMENT Left 4/17/2021    Procedure: DEBRIDEMENT WOUND AND DRESSING CHANGE (WASH OUT);  Surgeon: Mahin Traore DO;  [facility];  Service: General    WOUND DEBRIDEMENT Left 4/22/2021    Procedure: DEBRIDEMENT LOWER EXTREMITY (WASH OUT), left groin and thigh;  Surgeon: Sudheer Mcfarlane MD;  [facility];  Service: General    WOUND DEBRIDEMENT Left 5/26/2021    Procedure: DEBRIDEMENT LOWER EXTREMITY (WASH OUT);  Surgeon: Zak Castanon DO;  [facility];  Service: General    WOUND DEBRIDEMENT Left 5/28/2021    Procedure: Washout left thigh wound and closure;  Surgeon: Amor Jaramillo DO;  [facility];  Service: General     Social History     Substance and Sexual Activity   Alcohol Use Not Currently    Comment: rarely     Social History     Substance and Sexual Activity   Drug Use No     Social History     Tobacco Use   Smoking Status Former    Current packs/day: 0.00    Average packs/day: 1.5 packs/day for 20.0 years (29.9 ttl pk-yrs)    Types: Cigarettes    Start date: 01/2021    Quit date: 03/2021    Years since quitting: 3.2    Passive exposure: Past   Smokeless Tobacco Never     Family History:   Family History   Problem Relation Age of Onset    Heart disease Father     Diabetes Father     Hypertension Mother     Heart disease Mother        Meds/Allergies     Allergies   Allergen Reactions    Keflex [Cephalexin] Facial Swelling and Lip Swelling    Amoxicillin Hives     childhood       Current Outpatient Medications:     aspirin (ECOTRIN LOW STRENGTH) 81 mg EC tablet, Take 1 tablet (81 mg total) by mouth daily, Disp: 30 tablet, Rfl: 0    atorvastatin (LIPITOR) 80 mg tablet, Take 1 tablet (80  "mg total) by mouth daily, Disp: 90 tablet, Rfl: 3    carvedilol (COREG) 6.25 mg tablet, Take 1 tablet (6.25 mg total) by mouth 2 (two) times a day with meals, Disp: 60 tablet, Rfl: 3    glimepiride (AMARYL) 2 mg tablet, Take 1 tablet (2 mg total) by mouth daily with breakfast, Disp: 30 tablet, Rfl: 3    insulin glargine (LANTUS) 100 units/mL subcutaneous injection, Inject 12 Units under the skin daily at bedtime Do not start before February 21, 2024., Disp: 10 mL, Rfl: 0    Insulin Pen Needle (BD ULTRA-FINE PEN NEEDLES) 29G X 12.7MM MISC, USE 1 NEEDLE ONCE DAILY FOR INJECTION UNDER THE SKIN, Disp: 50 each, Rfl: 3    amLODIPine (NORVASC) 10 mg tablet, Take 1 tablet (10 mg total) by mouth daily, Disp: 30 tablet, Rfl: 3    EPINEPHrine (EPIPEN) 0.3 mg/0.3 mL SOAJ, Inject 0.3 mL (0.3 mg total) into a muscle once for 1 dose, Disp: 0.6 mL, Rfl: 0    lisinopril (ZESTRIL) 20 mg tablet, Take 1 tablet (20 mg total) by mouth daily, Disp: 30 tablet, Rfl: 3    Vitals: Blood pressure 134/74, pulse 76, height 5' 7\" (1.702 m), weight (!) 189 kg (417 lb), SpO2 93%.    Body mass index is 65.31 kg/m².  Vitals:    05/22/24 1628   Weight: (!) 189 kg (417 lb)     BP Readings from Last 3 Encounters:   05/22/24 134/74   05/16/24 144/72   05/03/24 136/70       Physical Exam:  Physical Exam    Neurologic:  Alert & oriented x 3, no new focal deficits, Not in any acute distress,  Constitutional: Morbidly obese  Eyes:  Pupil equal and reacting to light, conjunctiva normal,   HENT:  Atraumatic, oropharynx moist, Neck- normal range of motion, no tenderness,  Neck supple, No JVP, No LNP   Respiratory:  Bilateral air entry, mostly clear to auscultation  Cardiovascular: S1-S2 regular with a I/VI systolic murmur   GI:  Soft, nondistended, normal bowel sounds, nontender, no hepatosplenomegaly appreciated.  Musculoskeletal:  No tenderness, no deformities.   Skin:  Well hydrated, no rash   Extremities: Chronic lymphedema        Diagnostic Studies Review " Cardio:      EKG: Normal sinus rhythm, heart rate 73/min    Cardiac testing:   Results for orders placed during the hospital encounter of 21    Echo complete with contrast if indicated    Narrative  [address] [address] [phone]    Transthoracic Echocardiogram  2D, M-mode, Doppler, and Color Doppler    Study date:  2021    Patient: MORELIA MASSEY  MR number: [id number]  Account number: [id number]  : 1980  Age: 40 years  Gender: Male  Status: Inpatient  Location: Bedside  Height: 66 in  Weight: 369.2 lb  BP: 100/ 58 mmHg    Indications: Assess left ventricular function.    Diagnoses: J96.91 - Respiratory failure, unspecified with hypoxia    Sonographer:  SIMEON Barnes  Primary Physician:  Heydi Norman MD  Referring Physician:  Lauryn Ullrich,DO  Group:  [facility]  Cardiology Fellow:  Simba Rm MD  Interpreting Physician:  Irving Arnett MD    SUMMARY    PROCEDURE INFORMATION:  This was a technically difficult study.  Intravenous contrast ( 0.6 ml Definity/NSS) was administered.    LEFT VENTRICLE:  Systolic function was normal. Ejection fraction was estimated to be 65 %.  There were no regional wall motion abnormalities.    RIGHT VENTRICLE:  The size was normal.  Systolic function was normal.    HISTORY: PRIOR HISTORY: Respiratory failure with hypoxia, CAD s/p stent, Hyperlipidemia    PROCEDURE: The procedure was performed at the bedside. This was a routine study. The transthoracic approach was used. The study included complete 2D imaging, M-mode, complete spectral Doppler, and color Doppler. The heart rate was 66 bpm,  at the start of the study. Images were obtained from the parasternal, apical, subcostal, and suprasternal notch acoustic windows. Intravenous contrast ( 0.6 ml Definity/NSS) was administered. Echocardiographic views were limited due to  decreased penetration and  patient on mechanical ventilator. This was a technically difficult study.    LEFT VENTRICLE: Size was normal. Systolic function was normal. Ejection fraction was estimated to be 65 %. There were no regional wall motion abnormalities. Wall thickness was normal. There was no evidence of concentric hypertrophy.  DOPPLER: The transmitral flow pattern was normal. The study was not technically sufficient to allow evaluation of LV diastolic function.    RIGHT VENTRICLE: The size was normal. Systolic function was normal.    LEFT ATRIUM: Size was normal.    RIGHT ATRIUM: Size was normal.    MITRAL VALVE: Valve structure was normal. There was normal leaflet separation. DOPPLER: The transmitral velocity was within the normal range. There was no evidence for stenosis. There was no significant regurgitation.    AORTIC VALVE: The valve was probably trileaflet. Leaflets exhibited normal thickness and normal cuspal separation. DOPPLER: Transaortic velocity was within the normal range. There was no evidence for stenosis. There was no regurgitation.    TRICUSPID VALVE: The valve structure was normal. There was normal leaflet separation. DOPPLER: The transtricuspid velocity was within the normal range. There was no evidence for stenosis. There was trace regurgitation. The tricuspid jet  envelope definition was inadequate for estimation of RV systolic pressure.    PULMONIC VALVE: Leaflets exhibited normal thickness, no calcification, and normal cuspal separation. DOPPLER: The transpulmonic velocity was within the normal range. There was no evidence for stenosis. There was no significant  regurgitation.    PERICARDIUM: There was no pericardial effusion.    AORTA: The root exhibited normal size.    SYSTEMIC VEINS: IVC: The inferior vena cava was dilated. Respirophasic changes in dimension were absent.    SYSTEM MEASUREMENT TABLES    2D  %FS: 40.76 %  Ao Diam: 3.12 cm  EDV(Teich): 125.68 ml  EF(Teich): 71.22 %  ESV(Teich): 36.17 ml  IVSd:  0.96 cm  LA Area: 18.82 cm2  LA Diam: 4.09 cm  LVEDV MOD A4C: 139.97 ml  LVEF MOD A4C: 73.53 %  LVESV MOD A4C: 37.05 ml  LVIDd: 5.13 cm  LVIDs: 3.04 cm  LVLd A4C: 8.89 cm  LVLs A4C: 6.99 cm  LVPWd: 0.97 cm  RA Area: 19.01 cm2  RVIDd: 3.58 cm  SV MOD A4C: 102.92 ml  SV(Teich): 89.51 ml    MM  TAPSE: 2.34 cm    PW  E' Sept: 0.07 m/s  E/E' Sept: 7.73  MV A Flavio: 0.38 m/s  MV Dec Logan: 2.89 m/s2  MV DecT: 197.93 ms  MV E Flavio: 0.57 m/s  MV E/A Ratio: 1.5  MV PHT: 57.4 ms  MVA By PHT: 3.83 cm2    [facility] Commission Accredited Echocardiography Laboratory    Prepared and electronically signed by    Irving Arnett MD  Signed 2021 10:12:03        Results for orders placed during the hospital encounter of 20    Cardiac catheterization    Narrative  [address] [address] [phone]    ([place])    Invasive Cardiovascular Lab Complete Report    Patient: MORELIA MASSEY  MR number: [id number]  Account number: [id number]  Study date: 2020  Gender: Male  : 1980  Height: 68.9 in  Weight: 374 lb  BSA: 2.69 mï¾²    Allergies: AMOXICILLIN    Diagnostic Cardiologist:  Jhon Mejias DO  Interventional Cardiologist:  Jhon Mejias DO    SUMMARY    CORONARY CIRCULATION:  There was 1-vessel coronary artery disease.  Mid RCA: There was a 80 % stenosis.    1ST LESION INTERVENTIONS:  A balloon angioplasty with stent procedure was performed on the 80 % lesion in the mid RCA. Following intervention there was a 0 % residual stenosis.  A Resolute Joshua Rx 3.0 x 12mm drug-eluting stent was placed across the lesion and deployed at a maximum inflation pressure of 14 sarah.    INDICATIONS:  --  Possible CAD: unstable angina.    PROCEDURES PERFORMED    --  Left coronary angiography.  --  Right coronary angiography.  --  Inpatient.  --  Mod Sedation Same Physician Initial 15min.  --  Mod Sedation Same Physician Add 15min.  --  Coronary  Catheterization (w/o [facility]).  --  Mod Sedation Same Physician Add 15min.  --  Coronary Drug Eluting Stent w/PTCA.  --  Intervention on mid RCA: balloon angioplasty, stent.    PROCEDURE: The risks and alternatives of the procedures and conscious sedation were explained to the patient and informed consent was obtained. The patient was brought to the cath lab and placed on the table. The planned puncture sites  were prepped and draped in the usual sterile fashion.    --  Right radial artery access. After performing an Ravi's test to verify adequate ulnar artery supply to the hand, the radial site was prepped. The puncture site was infiltrated with local anesthetic. The vessel was accessed using the  modified Seldinger technique, a wire was advanced into the vessel, and a sheath was advanced over the wire into the vessel.    --  Left coronary artery angiography. A catheter was advanced over a guidewire into the aorta and positioned in the left coronary artery ostium under fluoroscopic guidance. Angiography was performed.    --  Right coronary artery angiography. A catheter was advanced over a guidewire into the aorta and positioned in the right coronary artery ostium under fluoroscopic guidance. Angiography was performed.    --  Inpatient.    --  Mod Sedation Same Physician Initial 15min.    --  Mod Sedation Same Physician Add 15min.    --  Coronary Catheterization (w/o [facility]).    --  Mod Sedation Same Physician Add 15min.    LESION INTERVENTION: A balloon angioplasty with stent procedure was performed on the 80 % lesion in the mid RCA. Following intervention there was a 0 % residual stenosis. There was HERNAN 3 flow before the procedure and HERNAN 3 flow after the  procedure.    --  Vessel setup was performed. A 6Fr. Launcher AR1 guiding catheter was used to cannulate the vessel.    --  Vessel setup was performed. A Runthrough NS 180cm wire was used to cross the lesion.    --  Balloon angioplasty was performed, using a Trek  Rx 2.5 x 12mm balloon, with 2 inflations and a maximum inflation pressure of 12 sarah.    --  A Resolute Joshua Rx 3.0 x 12mm drug-eluting stent was placed across the lesion and deployed at a maximum inflation pressure of 14 sarah.    INTERVENTIONS:  --  Coronary Drug Eluting Stent w/PTCA.    PROCEDURE COMPLETION: The patient tolerated the procedure well and was discharged from the cath lab. TIMING: Test started at 11:08. Test concluded at 11:49. HEMOSTASIS: The sheath was removed. The site was compressed with a Hemoband  device. Hemostasis was obtained. MEDICATIONS GIVEN: Versed (2mg/2ml), 2 mg, IV, at 11:02. Fentanyl (1OOmcg/2 ml), 50 mcg, IV, at 11:02. 1% Lidocaine, 1 ml, subcutaneously, at 11:09. Nitroglycerin (200mcg/ml), 200 mcg, at 11:13. Verapamil  (5mg/2ml), 2.5 mg, IV, at 11:14. Heparin 1000 units/ml, 4,000 units, IV, at 11:14. Fentanyl (1OOmcg/2 ml), 50 mcg, IV, at 11:14. Versed (2mg/2ml), 1 mg, IV, at 11:14. Ticagrelor, 180 mg, PO, at 11:28. Heparin 1000 units/ml, 8,000 units,  IV, at 11:29. Versed (2mg/2ml), 1 mg, IV, at 11:39. Fentanyl (1OOmcg/2 ml), 25 mcg, IV, at 11:39. Fentanyl (1OOmcg/2 ml), 25 mcg, IV, at 11:41. Heparin 1000 units/ml, 2,000 units, IV, at 11:49. CONTRAST GIVEN: 130 ml Omnipaque (350 mg I  /ml). RADIATION EXPOSURE: Fluoroscopy time: 9.95 min.    CORONARY VESSELS:   --  The coronary circulation is right dominant.  --  There was 1-vessel coronary artery disease.  --  Left main: Angiography showed minor luminal irregularities.  --  LAD: Angiography showed minor luminal irregularities.  --  Mid circumflex: There was a 30 % stenosis.  --  RCA: The vessel was excessively tortuous.  --  Proximal RCA: There was a 30 % stenosis.  --  Mid RCA: There was a 80 % stenosis.  --  Distal RCA: There was a 40 % stenosis.    IMPRESSIONS:  1V CAD/RCA with SABINE placement mid RCA    RECOMMENDATIONS  weight loss, smoking cessation, DM control, dapt x 1 year, gdmt cad    DISPOSITION:  The patient left the  catheterization laboratory in stable condition.    Prepared and signed by    Jhon Mejias DO  Signed 2020 11:54:00    Study diagram    Angiographic findings  Native coronary lesions:  ï¾·Mid circumflex: Lesion 1: 30 % stenosis.  ï¾·Proximal RCA: Lesion 1: 30 % stenosis.  ï¾·Mid RCA: Lesion 1: 80 % stenosis.  ï¾·Distal RCA: Lesion 1: 40 % stenosis.  Intervention results  Native coronary lesions:  ï¾·balloon angioplasty and stent of the 80 % stenosis in mid RCA. 0 % residual stenosis. Stent: Resolute Joshua Rx 3.0 x 12mm drug-eluting.    Hemodynamic tables    Pressures:  Baseline  Pressures:  - HR: 77  Pressures:  - Rhythm:  Pressures:  -- Aortic Pressure (S/D/M): 160/100/126    Outputs:  Baseline  Outputs:  -- CALCULATIONS[age]  Outputs:  -- CALCULATIONS: Body Surface Area: 2.69  Outputs:  -- CALCULATIONS: Height in cm: 175.00  Outputs:  -- CALCULATIONS: Sex: Male  Outputs:  -- CALCULATIONS: Weight in k.00        Imaging:  Chest X-Ray:   No Chest XR results available for this patient.    CT-scan of the chest:     CTA chest pe study    Result Date: 2024  Impression No acute pulmonary emboli although the pulmonary arteries are suboptimally opacified. Mild diffuse bronchial wall thickening compatible with bronchitis, possibly related to RSV. Mild coronary artery calcification, greater than expected for the patient's age, with coronary stent. Workstation performed: [id number]     Lab Review   Lab Results   Component Value Date    WBC 10.98 (H) 2024    HGB 10.6 (L) 2024    HCT 33.2 (L) 2024    MCV 85 2024    RDW 13.3 2024     2024     BMP:  Lab Results   Component Value Date    SODIUM 141 2024    K 4.7 2024     2024    CO2 22 2024    BUN 40 (H) 2024    CREATININE 2.98 (H) 2024    GLUC 88 2024    GLUF 157 (H) 2024    CALCIUM 8.6 2024    CORRECTEDCA 9.5 2024    EGFR 26 (L)  "04/29/2024    MG 2.2 02/17/2024     LFT:  Lab Results   Component Value Date    AST 14 04/29/2024    ALT 10 04/29/2024    ALKPHOS 83 02/17/2024    TP 7.4 04/29/2024    ALB 3.3 (L) 04/29/2024      No components found for: \"TSH3\"  Lab Results   Component Value Date    OEY2XRWWFTNY 2.784 03/30/2022     Lab Results   Component Value Date    HGBA1C 6.6 (H) 04/29/2024     Lipid Profile:   Lab Results   Component Value Date    CHOLESTEROL 184 04/29/2024    HDL 28 (L) 04/29/2024    LDLCALC 121 (H) 04/29/2024    TRIG 198 (H) 04/29/2024     Lab Results   Component Value Date    CHOLESTEROL 184 04/29/2024    CHOLESTEROL 141 04/04/2023     Lab Results   Component Value Date    TROPONINI <0.02 05/26/2021     Lab Results   Component Value Date    NTBNP 203 (H) 12/02/2021      Recent Results (from the past 672 hour(s))   Comprehensive metabolic panel    Collection Time: 04/29/24  1:22 PM   Result Value Ref Range    Glucose, Random 88 70 - 99 mg/dL    BUN 40 (H) 6 - 24 mg/dL    Creatinine 2.98 (H) 0.76 - 1.27 mg/dL    eGFR 26 (L) >59 mL/min/1.73    SL AMB BUN/CREATININE RATIO 13 9 - 20    Sodium 141 134 - 144 mmol/L    Potassium 4.7 3.5 - 5.2 mmol/L    Chloride 104 96 - 106 mmol/L    CO2 22 20 - 29 mmol/L    CALCIUM 8.8 8.7 - 10.2 mg/dL    Protein, Total 7.4 6.0 - 8.5 g/dL    Albumin 3.3 (L) 4.1 - 5.1 g/dL    Globulin, Total 4.1 1.5 - 4.5 g/dL    Albumin/Globulin Ratio 0.8 (L) 1.2 - 2.2    TOTAL BILIRUBIN 0.3 0.0 - 1.2 mg/dL    Alk Phos Isoenzymes 113 44 - 121 IU/L    AST 14 0 - 40 IU/L    ALT 10 0 - 44 IU/L   Lipid panel    Collection Time: 04/29/24  1:22 PM   Result Value Ref Range    Cholesterol, Total 184 100 - 199 mg/dL    Triglycerides 198 (H) 0 - 149 mg/dL    HDL 28 (L) >39 mg/dL    LDL Calculated 121 (H) 0 - 99 mg/dL   Albumin / creatinine urine ratio    Collection Time: 04/29/24  1:22 PM   Result Value Ref Range    Creatinine, Urine 78.8 Not Estab. mg/dL    Albumin,U,Random 4,143.0 Not Estab. ug/mL    Microalb/Creat Ratio " "5,258 (H) 0 - 29 mg/g creat   Cardiovascular Report    Collection Time: 04/29/24  1:22 PM   Result Value Ref Range    Interpretation Note    [facility] Kidney Stone Panel    Collection Time: 04/29/24  1:22 PM   Result Value Ref Range    Interpretation Note    Hemoglobin A1c (w/out EAG)    Collection Time: 04/29/24  1:22 PM   Result Value Ref Range    Hemoglobin A1C 6.6 (H) 4.8 - 5.6 %             Dr. Gino Fulton MD, FACC      \"This note has been constructed using a voice recognition system.Therefore there may be syntax, spelling, and/or grammatical errors. Please call if you have any questions. \"  "

## 2024-05-22 ENCOUNTER — OFFICE VISIT (OUTPATIENT)
Dept: CARDIOLOGY CLINIC | Facility: CLINIC | Age: 44
End: 2024-05-22
Payer: COMMERCIAL

## 2024-05-22 VITALS
HEIGHT: 67 IN | HEART RATE: 76 BPM | WEIGHT: 315 LBS | SYSTOLIC BLOOD PRESSURE: 134 MMHG | DIASTOLIC BLOOD PRESSURE: 74 MMHG | OXYGEN SATURATION: 93 % | BODY MASS INDEX: 49.44 KG/M2

## 2024-05-22 DIAGNOSIS — I10 ESSENTIAL HYPERTENSION: ICD-10-CM

## 2024-05-22 DIAGNOSIS — R07.2 PRECORDIAL PAIN: ICD-10-CM

## 2024-05-22 DIAGNOSIS — E78.49 OTHER HYPERLIPIDEMIA: ICD-10-CM

## 2024-05-22 DIAGNOSIS — I25.10 CORONARY ARTERY DISEASE INVOLVING NATIVE CORONARY ARTERY OF NATIVE HEART WITHOUT ANGINA PECTORIS: ICD-10-CM

## 2024-05-22 DIAGNOSIS — I25.10 CORONARY ARTERY DISEASE, UNSPECIFIED VESSEL OR LESION TYPE, UNSPECIFIED WHETHER ANGINA PRESENT, UNSPECIFIED WHETHER NATIVE OR TRANSPLANTED HEART: Primary | ICD-10-CM

## 2024-05-22 PROCEDURE — 99215 OFFICE O/P EST HI 40 MIN: CPT | Performed by: INTERNAL MEDICINE

## 2024-05-22 PROCEDURE — 93000 ELECTROCARDIOGRAM COMPLETE: CPT | Performed by: INTERNAL MEDICINE

## 2024-05-23 PROCEDURE — 88304 TISSUE EXAM BY PATHOLOGIST: CPT | Performed by: PATHOLOGY

## 2024-05-30 ENCOUNTER — OFFICE VISIT (OUTPATIENT)
Dept: SURGERY | Facility: CLINIC | Age: 44
End: 2024-05-30
Payer: COMMERCIAL

## 2024-05-30 VITALS — WEIGHT: 315 LBS | HEIGHT: 67 IN | BODY MASS INDEX: 49.44 KG/M2 | TEMPERATURE: 97 F

## 2024-05-30 DIAGNOSIS — Z09 SURGICAL FOLLOW-UP CARE: Primary | ICD-10-CM

## 2024-05-30 PROCEDURE — 99212 OFFICE O/P EST SF 10 MIN: CPT | Performed by: SPECIALIST

## 2024-05-30 NOTE — PROGRESS NOTES
" General Surgery Office Visit Follow up   [facility]  Patient: Joaquin Frankel   : 1980 Sex: male MRN: [id number]   CSN: [id number] PCP: Hany Mcfarland DO    Assessment/ Plan:  Joaquin Frankel is a 44 y.o. male  day(s) POD # 2 weeks s/p cyst excision from scalp  Surgical follow-up care [Z09]    Plan  Stable postop   Removal of stitches  Discharge home follow-up    SUBJECTIVE:   I am doing very well my stitches have well-healed  OBJECTIVE:  No complaints  No fever no chills no rigors  Tolerating p.o. Diet well  Normal bowel movement no constipation or diarrhea  Ambulating well   Vitals:   Temp (!) 97 °F (36.1 °C) (Core)   Ht 5' 7\" (1.702 m)   Wt (!) 187 kg (413 lb)   BMI 64.68 kg/m²     Active medications:    Current Outpatient Medications:     amLODIPine (NORVASC) 10 mg tablet, Take 1 tablet (10 mg total) by mouth daily, Disp: 30 tablet, Rfl: 3    aspirin (ECOTRIN LOW STRENGTH) 81 mg EC tablet, Take 1 tablet (81 mg total) by mouth daily, Disp: 30 tablet, Rfl: 0    atorvastatin (LIPITOR) 80 mg tablet, Take 1 tablet (80 mg total) by mouth daily, Disp: 90 tablet, Rfl: 3    carvedilol (COREG) 6.25 mg tablet, Take 1 tablet (6.25 mg total) by mouth 2 (two) times a day with meals, Disp: 60 tablet, Rfl: 3    EPINEPHrine (EPIPEN) 0.3 mg/0.3 mL SOAJ, Inject 0.3 mL (0.3 mg total) into a muscle once for 1 dose, Disp: 0.6 mL, Rfl: 0    glimepiride (AMARYL) 2 mg tablet, Take 1 tablet (2 mg total) by mouth daily with breakfast, Disp: 30 tablet, Rfl: 3    insulin glargine (LANTUS) 100 units/mL subcutaneous injection, Inject 12 Units under the skin daily at bedtime Do not start before 2024., Disp: 10 mL, Rfl: 0    Insulin Pen Needle (BD ULTRA-FINE PEN NEEDLES) 29G X 12.7MM MISC, USE 1 NEEDLE ONCE DAILY FOR INJECTION UNDER THE SKIN, Disp: 50 each, Rfl: 3    lisinopril (ZESTRIL) 20 mg tablet, Take 1 tablet (20 mg total) by mouth daily, Disp: 30 tablet, Rfl: 3    Physical Exam: "   General Alert awake morbidly obese with BMI 64.68  Normocephalic atraumatic PERRLA   Lungs clear bilaterally  Cardiac normal S1 normal S2  Abdomen soft,non tender Bowel sounds present  Skin: surgical dressing is C/D/I  Ext: No clubbing, cyanosis, edema  Surgical wound well healed    Visit Diagnosis: . Diagnoses and all orders for this visit:    Surgical follow-up care       Plan of care was discussed with patient in detail    Pertinent labs reviewed  Most Recent Labs:   No visits with results within 2 Week(s) from this visit.   Latest known visit with results is:   Procedure visit on 05/16/2024   Component Date Value Ref Range Status    Case Report 05/16/2024    Final                    Value:Surgical Pathology Report                         Case: [id number]                                  Authorizing Provider:  Krishna Ramirez MD          Collected:           05/16/2024 1622              Ordering Location:     [facility]   Received:            05/16/2024 1622                                     [place]                                                                       Pathologist:           Mariela Alexis MD                                                         Specimen:    Skin, Cyst/Tag/Debridement, Sebaceous cyst right side of scalp                             Final Diagnosis 05/16/2024    Final                    Value:A. Skin, Cyst/Tag/Debridement, Sebaceous cyst right side of scalp, excision:  - Epidermal (infundibular) cyst.           Additional Information 05/16/2024    Final                    Value:All reported additional testing was performed with appropriately reactive controls.  These tests were developed and their performance characteristics determined by [facility] or appropriate performing facility, though some tests may be performed on tissues which have not been validated for performance characteristics (such as staining performed on alcohol exposed cell  "blocks and decalcified tissues).  Results should be interpreted with caution and in the context of the patients’ clinical condition. These tests may not be cleared or approved by the U.S. Food and Drug Administration, though the FDA has determined that such clearance or approval is not necessary. These tests are used for clinical purposes and they should not be regarded as investigational or for research. This laboratory has been approved by CLIA 88, designated as a high-complexity laboratory and is qualified to perform these tests.  Interpretation performed at [facility], [facility] [address].      Gross Description 05/16/2024    Final                    Value:A. The specimen is received in formalin, labeled with the patient's name and hospital number, and is designated \"skin cyst.\"  It consists of a focally disrupted 1.8 x 1.5 x 1.3 cm tan-white cyst.  The cyst is partially surfaced on 1 aspect by a 1.9 x 1.2 cm tan-white, wrinkled, hairbearing skin ellipse.  Sectioning reveals the cyst contains friable tan, soft material.  Representative sections are submitted in 1 cassette.    Note: The estimated total formalin fixation time based upon information provided by the submitting clinician and the standard processing schedule is over 72 hours. MScheib      Clinical Information 05/16/2024    Final                    Value:Sebaceous cyst right side of scalp  Epidermoid cyst of skin of scalp       Pertinent images and available reads personally reviewed  No results found.      Pertinent notes reviewed  Final Diagnosis   A. Skin, Cyst/Tag/Debridement, Sebaceous cyst right side of scalp, excision:  - Epidermal (infundibular) cyst.        Electronically signed by Mariela Alexis MD on 5/23/2024 at 10:43 AM       Counseling / Coordination of Care  Total Office time /unit time spent today 15minutes. Greater than 50% of total time was spent with the patient and / or family " "counseling and / or coordination of care. A description of the counseling / coordination of care:  I performed an interim history, pertinent images and labs, performed a physical examination to arrive at the plan delineated above with associated thought processes.         Krishna Ramirez MD MS FRCS FACS  [facility]  05/30/24 4:25 PM        Portions of the record may have been created with voice recognition software. Occasional wrong word or \"sound a like\" substitutions may have occurred due to the inherent limitations of voice recognition software. Read the chart carefully and recognize, using context, where substitutions have occurred.        "

## 2024-06-03 ENCOUNTER — APPOINTMENT (INPATIENT)
Dept: RADIOLOGY | Facility: HOSPITAL | Age: 44
DRG: 673 | End: 2024-06-03
Payer: COMMERCIAL

## 2024-06-03 ENCOUNTER — APPOINTMENT (EMERGENCY)
Dept: RADIOLOGY | Facility: HOSPITAL | Age: 44
DRG: 673 | End: 2024-06-03
Payer: COMMERCIAL

## 2024-06-03 ENCOUNTER — HOSPITAL ENCOUNTER (INPATIENT)
Facility: HOSPITAL | Age: 44
LOS: 23 days | Discharge: HOME | DRG: 673 | End: 2024-06-26
Attending: EMERGENCY MEDICINE | Admitting: FAMILY MEDICINE
Payer: COMMERCIAL

## 2024-06-03 ENCOUNTER — APPOINTMENT (OUTPATIENT)
Dept: RADIOLOGY | Facility: HOSPITAL | Age: 44
DRG: 673 | End: 2024-06-03
Payer: COMMERCIAL

## 2024-06-03 DIAGNOSIS — I89.0 CHRONIC ACQUIRED LYMPHEDEMA: ICD-10-CM

## 2024-06-03 DIAGNOSIS — I25.119 CORONARY ARTERY DISEASE INVOLVING NATIVE CORONARY ARTERY OF NATIVE HEART WITH ANGINA PECTORIS (HCC): ICD-10-CM

## 2024-06-03 DIAGNOSIS — E87.79 OTHER HYPERVOLEMIA: ICD-10-CM

## 2024-06-03 DIAGNOSIS — R09.02 HYPOXIA: ICD-10-CM

## 2024-06-03 DIAGNOSIS — R07.9 CHEST PAIN: ICD-10-CM

## 2024-06-03 DIAGNOSIS — S31.109A WOUND OF ABDOMEN: ICD-10-CM

## 2024-06-03 DIAGNOSIS — N17.9 AKI (ACUTE KIDNEY INJURY) (HCC): ICD-10-CM

## 2024-06-03 DIAGNOSIS — E55.9 VITAMIN D DEFICIENCY: ICD-10-CM

## 2024-06-03 DIAGNOSIS — R21 RASH: ICD-10-CM

## 2024-06-03 DIAGNOSIS — R06.09 DYSPNEA ON EXERTION: Primary | ICD-10-CM

## 2024-06-03 DIAGNOSIS — N19 RENAL FAILURE: ICD-10-CM

## 2024-06-03 DIAGNOSIS — K59.00 CONSTIPATION: ICD-10-CM

## 2024-06-03 DIAGNOSIS — D47.2 IGG GAMMOPATHY: ICD-10-CM

## 2024-06-03 DIAGNOSIS — I10 PRIMARY HYPERTENSION: ICD-10-CM

## 2024-06-03 DIAGNOSIS — N18.4 CKD (CHRONIC KIDNEY DISEASE) STAGE 4, GFR 15-29 ML/MIN (HCC): ICD-10-CM

## 2024-06-03 DIAGNOSIS — T14.8XXA WOUND DRAINAGE: ICD-10-CM

## 2024-06-03 DIAGNOSIS — D63.8 ANEMIA OF CHRONIC DISEASE: ICD-10-CM

## 2024-06-03 DIAGNOSIS — R52 PAIN: ICD-10-CM

## 2024-06-03 DIAGNOSIS — L03.116 CELLULITIS OF LEFT LOWER EXTREMITY: ICD-10-CM

## 2024-06-03 PROBLEM — R79.89 ELEVATED D-DIMER: Status: ACTIVE | Noted: 2024-06-03

## 2024-06-03 PROBLEM — E87.70 VOLUME OVERLOAD: Status: ACTIVE | Noted: 2024-06-03

## 2024-06-03 LAB
2HR DELTA HS TROPONIN: -2 NG/L
4HR DELTA HS TROPONIN: 2 NG/L
ALBUMIN SERPL BCG-MCNC: 3 G/DL (ref 3.5–5)
ALP SERPL-CCNC: 72 U/L (ref 34–104)
ALT SERPL W P-5'-P-CCNC: 9 U/L (ref 7–52)
AMORPH URATE CRY URNS QL MICRO: ABNORMAL
ANION GAP SERPL CALCULATED.3IONS-SCNC: 13 MMOL/L (ref 4–13)
APTT PPP: 34 SECONDS (ref 23–37)
APTT PPP: 37 SECONDS (ref 23–37)
AST SERPL W P-5'-P-CCNC: 13 U/L (ref 13–39)
BACTERIA UR QL AUTO: ABNORMAL /HPF
BASOPHILS # BLD AUTO: 0.04 THOUSANDS/ÂΜL (ref 0–0.1)
BASOPHILS NFR BLD AUTO: 0 % (ref 0–1)
BILIRUB SERPL-MCNC: 0.35 MG/DL (ref 0.2–1)
BILIRUB UR QL STRIP: NEGATIVE
BNP SERPL-MCNC: 233 PG/ML (ref 0–100)
BUN SERPL-MCNC: 62 MG/DL (ref 5–25)
CALCIUM ALBUM COR SERPL-MCNC: 8.9 MG/DL (ref 8.3–10.1)
CALCIUM SERPL-MCNC: 8.1 MG/DL (ref 8.4–10.2)
CARDIAC TROPONIN I PNL SERPL HS: 21 NG/L
CARDIAC TROPONIN I PNL SERPL HS: 23 NG/L
CARDIAC TROPONIN I PNL SERPL HS: 25 NG/L
CHLORIDE SERPL-SCNC: 104 MMOL/L (ref 96–108)
CK SERPL-CCNC: 267 U/L (ref 39–308)
CLARITY UR: CLEAR
CO2 SERPL-SCNC: 21 MMOL/L (ref 21–32)
COLOR UR: YELLOW
CREAT SERPL-MCNC: 6.4 MG/DL (ref 0.6–1.3)
D DIMER PPP FEU-MCNC: 1.97 UG/ML FEU
EOSINOPHIL # BLD AUTO: 0.18 THOUSAND/ÂΜL (ref 0–0.61)
EOSINOPHIL NFR BLD AUTO: 2 % (ref 0–6)
ERYTHROCYTE [DISTWIDTH] IN BLOOD BY AUTOMATED COUNT: 14.4 % (ref 11.6–15.1)
GFR SERPL CREATININE-BSD FRML MDRD: 9 ML/MIN/1.73SQ M
GLUCOSE SERPL-MCNC: 116 MG/DL (ref 65–140)
GLUCOSE SERPL-MCNC: 121 MG/DL (ref 65–140)
GLUCOSE SERPL-MCNC: 136 MG/DL (ref 65–140)
GLUCOSE SERPL-MCNC: 88 MG/DL (ref 65–140)
GLUCOSE UR STRIP-MCNC: NEGATIVE MG/DL
HCT VFR BLD AUTO: 27.7 % (ref 36.5–49.3)
HGB BLD-MCNC: 9 G/DL (ref 12–17)
HGB UR QL STRIP.AUTO: ABNORMAL
IMM GRANULOCYTES # BLD AUTO: 0.05 THOUSAND/UL (ref 0–0.2)
IMM GRANULOCYTES NFR BLD AUTO: 1 % (ref 0–2)
INR PPP: 1.1 (ref 0.84–1.19)
KETONES UR STRIP-MCNC: NEGATIVE MG/DL
LEUKOCYTE ESTERASE UR QL STRIP: NEGATIVE
LYMPHOCYTES # BLD AUTO: 0.98 THOUSANDS/ÂΜL (ref 0.6–4.47)
LYMPHOCYTES NFR BLD AUTO: 10 % (ref 14–44)
MCH RBC QN AUTO: 27.7 PG (ref 26.8–34.3)
MCHC RBC AUTO-ENTMCNC: 32.5 G/DL (ref 31.4–37.4)
MCV RBC AUTO: 85 FL (ref 82–98)
MONOCYTES # BLD AUTO: 0.48 THOUSAND/ÂΜL (ref 0.17–1.22)
MONOCYTES NFR BLD AUTO: 5 % (ref 4–12)
NEUTROPHILS # BLD AUTO: 8.18 THOUSANDS/ÂΜL (ref 1.85–7.62)
NEUTS SEG NFR BLD AUTO: 82 % (ref 43–75)
NITRITE UR QL STRIP: NEGATIVE
NON-SQ EPI CELLS URNS QL MICRO: ABNORMAL /HPF
NRBC BLD AUTO-RTO: 0 /100 WBCS
PH UR STRIP.AUTO: 6 [PH]
PLATELET # BLD AUTO: 327 THOUSANDS/UL (ref 149–390)
PMV BLD AUTO: 8.7 FL (ref 8.9–12.7)
POTASSIUM SERPL-SCNC: 3.8 MMOL/L (ref 3.5–5.3)
PROT SERPL-MCNC: 7.6 G/DL (ref 6.4–8.4)
PROT UR STRIP-MCNC: ABNORMAL MG/DL
PROTHROMBIN TIME: 14.5 SECONDS (ref 11.6–14.5)
RBC # BLD AUTO: 3.25 MILLION/UL (ref 3.88–5.62)
RBC #/AREA URNS AUTO: ABNORMAL /HPF
SODIUM SERPL-SCNC: 138 MMOL/L (ref 135–147)
SP GR UR STRIP.AUTO: 1.02 (ref 1–1.03)
UROBILINOGEN UR STRIP-ACNC: <2 MG/DL
WBC # BLD AUTO: 9.91 THOUSAND/UL (ref 4.31–10.16)
WBC #/AREA URNS AUTO: ABNORMAL /HPF

## 2024-06-03 PROCEDURE — 99223 1ST HOSP IP/OBS HIGH 75: CPT | Performed by: FAMILY MEDICINE

## 2024-06-03 PROCEDURE — 96365 THER/PROPH/DIAG IV INF INIT: CPT

## 2024-06-03 PROCEDURE — 36415 COLL VENOUS BLD VENIPUNCTURE: CPT | Performed by: EMERGENCY MEDICINE

## 2024-06-03 PROCEDURE — 84484 ASSAY OF TROPONIN QUANT: CPT | Performed by: EMERGENCY MEDICINE

## 2024-06-03 PROCEDURE — 84484 ASSAY OF TROPONIN QUANT: CPT | Performed by: FAMILY MEDICINE

## 2024-06-03 PROCEDURE — 83521 IG LIGHT CHAINS FREE EACH: CPT | Performed by: INTERNAL MEDICINE

## 2024-06-03 PROCEDURE — A9540 TC99M MAA: HCPCS

## 2024-06-03 PROCEDURE — 85730 THROMBOPLASTIN TIME PARTIAL: CPT | Performed by: FAMILY MEDICINE

## 2024-06-03 PROCEDURE — 82948 REAGENT STRIP/BLOOD GLUCOSE: CPT

## 2024-06-03 PROCEDURE — 85610 PROTHROMBIN TIME: CPT | Performed by: EMERGENCY MEDICINE

## 2024-06-03 PROCEDURE — 84166 PROTEIN E-PHORESIS/URINE/CSF: CPT | Performed by: INTERNAL MEDICINE

## 2024-06-03 PROCEDURE — 78582 LUNG VENTILAT&PERFUS IMAGING: CPT

## 2024-06-03 PROCEDURE — 71045 X-RAY EXAM CHEST 1 VIEW: CPT

## 2024-06-03 PROCEDURE — 85730 THROMBOPLASTIN TIME PARTIAL: CPT | Performed by: EMERGENCY MEDICINE

## 2024-06-03 PROCEDURE — 99285 EMERGENCY DEPT VISIT HI MDM: CPT | Performed by: EMERGENCY MEDICINE

## 2024-06-03 PROCEDURE — 99223 1ST HOSP IP/OBS HIGH 75: CPT | Performed by: INTERNAL MEDICINE

## 2024-06-03 PROCEDURE — 81001 URINALYSIS AUTO W/SCOPE: CPT | Performed by: FAMILY MEDICINE

## 2024-06-03 PROCEDURE — 86335 IMMUNFIX E-PHORSIS/URINE/CSF: CPT | Performed by: INTERNAL MEDICINE

## 2024-06-03 PROCEDURE — 80053 COMPREHEN METABOLIC PANEL: CPT | Performed by: EMERGENCY MEDICINE

## 2024-06-03 PROCEDURE — 82550 ASSAY OF CK (CPK): CPT | Performed by: FAMILY MEDICINE

## 2024-06-03 PROCEDURE — NC001 PR NO CHARGE: Performed by: INTERNAL MEDICINE

## 2024-06-03 PROCEDURE — 71250 CT THORAX DX C-: CPT

## 2024-06-03 PROCEDURE — 86334 IMMUNOFIX E-PHORESIS SERUM: CPT | Performed by: INTERNAL MEDICINE

## 2024-06-03 PROCEDURE — 85379 FIBRIN DEGRADATION QUANT: CPT | Performed by: EMERGENCY MEDICINE

## 2024-06-03 PROCEDURE — 99285 EMERGENCY DEPT VISIT HI MDM: CPT

## 2024-06-03 PROCEDURE — 83880 ASSAY OF NATRIURETIC PEPTIDE: CPT | Performed by: FAMILY MEDICINE

## 2024-06-03 PROCEDURE — A9539 TC99M PENTETATE: HCPCS

## 2024-06-03 PROCEDURE — 93005 ELECTROCARDIOGRAM TRACING: CPT

## 2024-06-03 PROCEDURE — 96375 TX/PRO/DX INJ NEW DRUG ADDON: CPT

## 2024-06-03 PROCEDURE — 84165 PROTEIN E-PHORESIS SERUM: CPT | Performed by: INTERNAL MEDICINE

## 2024-06-03 PROCEDURE — 74176 CT ABD & PELVIS W/O CONTRAST: CPT

## 2024-06-03 PROCEDURE — 85025 COMPLETE CBC W/AUTO DIFF WBC: CPT | Performed by: EMERGENCY MEDICINE

## 2024-06-03 RX ORDER — ACETAMINOPHEN 325 MG/1
650 TABLET ORAL EVERY 6 HOURS PRN
Status: DISCONTINUED | OUTPATIENT
Start: 2024-06-03 | End: 2024-06-10

## 2024-06-03 RX ORDER — HYDROMORPHONE HCL/PF 1 MG/ML
0.5 SYRINGE (ML) INJECTION ONCE
Status: COMPLETED | OUTPATIENT
Start: 2024-06-03 | End: 2024-06-03

## 2024-06-03 RX ORDER — HEPARIN SODIUM 5000 [USP'U]/ML
5000 INJECTION, SOLUTION INTRAVENOUS; SUBCUTANEOUS EVERY 8 HOURS SCHEDULED
Status: DISCONTINUED | OUTPATIENT
Start: 2024-06-03 | End: 2024-06-11 | Stop reason: SDUPTHER

## 2024-06-03 RX ORDER — ASPIRIN 81 MG/1
81 TABLET, CHEWABLE ORAL DAILY
Status: DISCONTINUED | OUTPATIENT
Start: 2024-06-04 | End: 2024-06-05

## 2024-06-03 RX ORDER — TRAMADOL HYDROCHLORIDE 50 MG/1
50 TABLET ORAL EVERY 12 HOURS PRN
Status: DISCONTINUED | OUTPATIENT
Start: 2024-06-03 | End: 2024-06-03

## 2024-06-03 RX ORDER — HYDROMORPHONE HCL/PF 1 MG/ML
1 SYRINGE (ML) INJECTION ONCE
Status: COMPLETED | OUTPATIENT
Start: 2024-06-03 | End: 2024-06-03

## 2024-06-03 RX ORDER — FUROSEMIDE 10 MG/ML
80 INJECTION INTRAMUSCULAR; INTRAVENOUS ONCE
Status: COMPLETED | OUTPATIENT
Start: 2024-06-03 | End: 2024-06-03

## 2024-06-03 RX ORDER — ATORVASTATIN CALCIUM 80 MG/1
80 TABLET, FILM COATED ORAL DAILY
Status: DISCONTINUED | OUTPATIENT
Start: 2024-06-03 | End: 2024-06-26 | Stop reason: HOSPADM

## 2024-06-03 RX ORDER — INSULIN LISPRO 100 [IU]/ML
1-6 INJECTION, SOLUTION INTRAVENOUS; SUBCUTANEOUS
Status: DISCONTINUED | OUTPATIENT
Start: 2024-06-03 | End: 2024-06-26 | Stop reason: HOSPADM

## 2024-06-03 RX ORDER — CARVEDILOL 6.25 MG/1
6.25 TABLET ORAL 2 TIMES DAILY WITH MEALS
Status: DISCONTINUED | OUTPATIENT
Start: 2024-06-03 | End: 2024-06-08

## 2024-06-03 RX ORDER — ACETAMINOPHEN 10 MG/ML
1000 INJECTION, SOLUTION INTRAVENOUS ONCE
Status: COMPLETED | OUTPATIENT
Start: 2024-06-03 | End: 2024-06-03

## 2024-06-03 RX ORDER — OXYCODONE HYDROCHLORIDE 5 MG/1
5 TABLET ORAL EVERY 8 HOURS PRN
Status: DISCONTINUED | OUTPATIENT
Start: 2024-06-03 | End: 2024-06-04

## 2024-06-03 RX ORDER — AMLODIPINE BESYLATE 10 MG/1
10 TABLET ORAL DAILY
Status: DISCONTINUED | OUTPATIENT
Start: 2024-06-04 | End: 2024-06-08

## 2024-06-03 RX ORDER — ASPIRIN 325 MG
325 TABLET ORAL ONCE
Status: COMPLETED | OUTPATIENT
Start: 2024-06-03 | End: 2024-06-03

## 2024-06-03 RX ORDER — HEPARIN SODIUM 10000 [USP'U]/100ML
3-30 INJECTION, SOLUTION INTRAVENOUS
Status: DISCONTINUED | OUTPATIENT
Start: 2024-06-03 | End: 2024-06-03

## 2024-06-03 RX ORDER — INSULIN GLARGINE 100 [IU]/ML
12 INJECTION, SOLUTION SUBCUTANEOUS
Status: DISCONTINUED | OUTPATIENT
Start: 2024-06-03 | End: 2024-06-05

## 2024-06-03 RX ORDER — INSULIN LISPRO 100 [IU]/ML
2-12 INJECTION, SOLUTION INTRAVENOUS; SUBCUTANEOUS
Status: DISCONTINUED | OUTPATIENT
Start: 2024-06-03 | End: 2024-06-26 | Stop reason: HOSPADM

## 2024-06-03 RX ADMIN — INSULIN GLARGINE 12 UNITS: 100 INJECTION, SOLUTION SUBCUTANEOUS at 21:41

## 2024-06-03 RX ADMIN — HYDROMORPHONE HYDROCHLORIDE 1 MG: 1 INJECTION, SOLUTION INTRAMUSCULAR; INTRAVENOUS; SUBCUTANEOUS at 08:50

## 2024-06-03 RX ADMIN — HEPARIN SODIUM 5000 UNITS: 5000 INJECTION, SOLUTION INTRAVENOUS; SUBCUTANEOUS at 18:15

## 2024-06-03 RX ADMIN — OXYCODONE HYDROCHLORIDE 5 MG: 5 TABLET ORAL at 19:32

## 2024-06-03 RX ADMIN — FUROSEMIDE 80 MG: 10 INJECTION, SOLUTION INTRAMUSCULAR; INTRAVENOUS at 14:37

## 2024-06-03 RX ADMIN — HYDROMORPHONE HYDROCHLORIDE 0.5 MG: 1 INJECTION, SOLUTION INTRAMUSCULAR; INTRAVENOUS; SUBCUTANEOUS at 22:10

## 2024-06-03 RX ADMIN — ASPIRIN 325 MG: 325 TABLET ORAL at 07:16

## 2024-06-03 RX ADMIN — ACETAMINOPHEN 1000 MG: 10 INJECTION INTRAVENOUS at 07:39

## 2024-06-03 RX ADMIN — ATORVASTATIN CALCIUM 80 MG: 80 TABLET, FILM COATED ORAL at 14:34

## 2024-06-03 RX ADMIN — CARVEDILOL 6.25 MG: 6.25 TABLET, FILM COATED ORAL at 16:55

## 2024-06-03 RX ADMIN — HEPARIN SODIUM 18 UNITS/KG/HR: 10000 INJECTION, SOLUTION INTRAVENOUS at 10:20

## 2024-06-03 RX ADMIN — TRAMADOL HYDROCHLORIDE 50 MG: 50 TABLET, FILM COATED ORAL at 15:23

## 2024-06-03 RX ADMIN — HYDROMORPHONE HYDROCHLORIDE 0.5 MG: 1 INJECTION, SOLUTION INTRAMUSCULAR; INTRAVENOUS; SUBCUTANEOUS at 12:34

## 2024-06-03 RX ADMIN — HYDROMORPHONE HYDROCHLORIDE 0.5 MG: 1 INJECTION, SOLUTION INTRAMUSCULAR; INTRAVENOUS; SUBCUTANEOUS at 10:18

## 2024-06-03 NOTE — H&P
[facility]  History and Physical  Name: Joaquin Frankel I  MRN: [id number]  Unit/Bed#: [address] [place] [address] I Date of Admission: 6/3/2024   Date of Service: 6/3/2024 I Hospital Day: 0    Assessment & Plan   * CLOVIS (acute kidney injury) (HCC)  Assessment & Plan  Patient was seen and examined in the emergency department and will be admitted to the hospital for further evaluation and management.  Patient presenting with evidence of acute kidney injury with a creatinine of 6.40.  Creatinine 1 month ago was 2.98 and 3 months ago was 1.22.  Question of possible volume overload on chest x-ray. Nephrology was consulted with recommendations for 80mg of IV Lasix. CT chest, abdomen, pelvis performed with no acute pulmonary or abdominal findings. There is evidence of left inguinal and left external iliac lymphadenopathy, increased from May 2022 which may be reactive to a process in the left lower extremity or in the partially imaged pannus. Monitor urinary response after Lasix administration. Repeat blood work in the morning. SPEP/UPEP/serum free light chin ratio ordered. Follow up with further recommendations from nephrology.    Elevated d-dimer  Assessment & Plan  Patient with an elevated D-dimer.  This could be elevated secondary to progressively worsening renal disease however there is also concern for underlying pulmonary embolism.  Will check a VQ scan.  Unable to perform CTA secondary to renal function.    Volume overload  Assessment & Plan  Question of volume overload on admission however CT imaging is unremarkable. Patient is getting 80mg of IV lasix.  Will monitor response.  2D echocardiogram will also be performed.  Cardiology consulted as noted above.    Acute respiratory failure with hypoxia (HCC)  Assessment & Plan  Patient also with evidence of acute hypoxic respiratory failure.  As noted above, question of possible volume overload for which patient is being administered 80 mg of IV Lasix.   Follow-up with recommendations from pulmonology and nephrology.  Will also obtain a VQ scan to evaluate for pulmonary embolism.  Patient was started on a heparin drip in the emergency department.    Anemia  Assessment & Plan  Patient with evidence of anemia with a hemoglobin level of 9.0.  No evidence of acute bleeding at this time.  Will check a stool occult blood.    Chest pain  Assessment & Plan  Patient with evidence of chest pain which could be secondary to volume overload or even a pulmonary embolism.  Patient does have a history of coronary artery disease as well so will monitor on telemetry and follow-up with recommendations from cardiology.    Morbid obesity (HCC)  Assessment & Plan  Patient counseled on the importance of exercise and following a healthy diet.    Diabetes (HCC)  Assessment & Plan  Lab Results   Component Value Date    HGBA1C 6.6 (H) 04/29/2024       Recent Labs     06/03/24  1124 06/03/24  1622   POCGLU 88 136       Blood Sugar Average: Last 72 hrs:  (P) 112    Patient will be placed on an insulin sliding scale.  Will continue to monitor blood sugar levels and make further adjustments as needed.  Will also continue patient's home dose of Lantus 12 units at bedtime.      Hypertension  Assessment & Plan  Continue home medications.    Coronary artery disease  Assessment & Plan  Patient with a history of coronary artery disease with multivessel disease status post stents.  Cardiology is following as noted above.  Will follow-up with their recommendations.         VTE Pharmacologic Prophylaxis:   Heparin.  Code Status: Level 1 - Full Code     Anticipated Length of Stay: Patient will be admitted on an inpatient basis with an anticipated length of stay of greater than 2 midnights secondary to above.    Total Time Spent on Date of Encounter in care of patient: 75 mins. This time was spent on one or more of the following: performing physical exam; counseling and coordination of care; obtaining or  "reviewing history; documenting in the medical record; reviewing/ordering tests, medications or procedures; communicating with other healthcare professionals and discussing with patient's family/caregivers.    Chief Complaint: Chest pain.    History of Present Illness:  Joaquin Frankel is a 44 y.o. male presenting to the emergency department with complaints of chest pain or shortness of breath that has been progressively worsening over the last 2 weeks.  Patient with a history of morbid obesity and chronic left lower extremity lymphedema.  In the emergency room patient was found to have significantly worsening renal failure and was also found to have an elevated D-dimer concerning for possible pulmonary embolism.  Patient currently denies any abdominal pain, lightheadedness, dizziness, palpitations, fevers, chills, change in urinary habits, change in bowel habits, recent travel or any known sick contacts.    Review of Systems:  14 point of review of systems obtained and reviewed and is negative except as outlined above in the HPI.     Past Medical and Surgical History:   Past Medical History:   Diagnosis Date    Acute hypoxic respiratory failure ([facility]) 10/07/2023    Arthritis     Breathing difficulty     Cellulitis 10/07/2023    Coronary artery disease     Diabetes mellitus ([facility])     Diabetic neuropathy ([facility]) 05/28/2021    Heart disease     CAD   s/p ptca with 1 stent 2012    Hidradenitis suppurativa     groin    History of transfusion     Hyperlipidemia     Hypertension     MI (myocardial infarction) ([facility])     \" silent \" M.I. in the past    Morbid obesity with BMI of 50.0-59.9, adult ([facility])     Nicotine dependence     Obesity     Pilonidal cyst     Type 1 non-ST elevation myocardial infarction (NSTEMI) ([facility]) 11/29/2020     Past Surgical History:   Procedure Laterality Date    CARDIAC SURGERY      CORONARY ANGIOPLASTY WITH STENT PLACEMENT      INCISION AND DRAINAGE OF WOUND N/A 1/24/2017    Procedure: INCISION AND " DRAINAGE (I&D) BUTTOCK, PILONIDAL CYST;  Surgeon: Simba Adhikari MD;  Location: [facility];  Service:     LEG SURGERY Left     I&D of left leg 2012    MO DEBRIDEMENT SUBCUTANEOUS TISSUE 1ST 20 SQ CM/< Left 7/6/2021    Procedure: DEBRIDEMENT WOUND (WASH OUT);  Surgeon: Sudheer Mcfarlane MD;  Location: [facility];  Service: General    MO EXC B9 LESION MRGN XCP SK TG T/A/L >4.0 CM Left 4/6/2021    Procedure: EXCISION THIGH MASS;  Surgeon: Sudheer Mcfarlane MD;  Location: [facility];  Service: General    MO EXCISION H/P/P/U COMPLEX REPAIR Left 7/6/2021    Procedure: EXCISION PERINEAL ABSCESS LEFT THIGH;  Surgeon: Sudheer Mcfarlane MD;  [facility]: [facility];  Service: General    MO NEGATIVE PRESSURE WOUND THERAPY DME <= 50 SQ CM Left 4/6/2021    Procedure: APPLICATION VAC DRESSING THIGH;  Surgeon: Sudheer Mcfarlane MD;  [facility];  Service: General    WOUND DEBRIDEMENT Left 4/17/2021    Procedure: DEBRIDEMENT WOUND AND DRESSING CHANGE (WASH OUT);  Surgeon: Mahin Traore DO;  [facility]: [facility];  Service: General    WOUND DEBRIDEMENT Left 4/22/2021    Procedure: DEBRIDEMENT LOWER EXTREMITY (WASH OUT), left groin and thigh;  Surgeon: Sudheer Mcfarlane MD;  [facility];  Service: General    WOUND DEBRIDEMENT Left 5/26/2021    Procedure: DEBRIDEMENT LOWER EXTREMITY (WASH OUT);  Surgeon: Zak Castanon DO;  [facility]: [facility];  Service: General    WOUND DEBRIDEMENT Left 5/28/2021    Procedure: Washout left thigh wound and closure;  Surgeon: Amor Jaramillo DO;  [facility]: [facility];  Service: General     Meds/Allergies:  Prior to Admission medications    Medication Sig Start Date End Date Taking? Authorizing Provider   amLODIPine (NORVASC) 10 mg tablet Take 1 tablet (10 mg total) by mouth daily 3/4/24 6/3/24 Yes Tuesday PETRA Villalobos   aspirin (ECOTRIN LOW STRENGTH) 81 mg EC tablet Take 1 tablet (81 mg total) by mouth daily 12/3/20  Yes Javier Hou MD   atorvastatin (LIPITOR) 80 mg tablet Take 1 tablet (80 mg total)  by mouth daily 2/28/24  Yes Tuesday [place] tavares   carvedilol (COREG) 6.25 mg tablet Take 1 tablet (6.25 mg total) by mouth 2 (two) times a day with meals 2/28/24  Yes Tuesday PETRA Villalobos   glimepiride (AMARYL) 2 mg tablet Take 1 tablet (2 mg total) by mouth daily with breakfast 2/28/24 6/3/24 Yes Tuesday PETRA Villalobos   lisinopril (ZESTRIL) 20 mg tablet Take 1 tablet (20 mg total) by mouth daily 3/4/24 6/3/24 Yes Tuesday [place]   EPINEPHrine (EPIPEN) 0.3 mg/0.3 mL SOAJ Inject 0.3 mL (0.3 mg total) into a muscle once for 1 dose 12/13/22 5/30/24  Cyndy Carver MD   insulin glargine (LANTUS) 100 units/mL subcutaneous injection Inject 12 Units under the skin daily at bedtime Do not start before February 21, 2024. 2/21/24   Lillie Borges DO   Insulin Pen Needle (BD ULTRA-FINE PEN NEEDLES) 29G X 12.7MM MISC USE 1 NEEDLE ONCE DAILY FOR INJECTION UNDER THE SKIN 11/1/23   Luc Horne MD     Allergies:   Allergies   Allergen Reactions    Keflex [Cephalexin] Facial Swelling and Lip Swelling    Amoxicillin Hives     childhood     Social History:  Marital Status: Single     Social History     Substance and Sexual Activity   Alcohol Use Not Currently    Comment: rarely     Social History     Tobacco Use   Smoking Status Former    Current packs/day: 0.00    Average packs/day: 1.5 packs/day for 20.0 years (29.9 ttl pk-yrs)    Types: Cigarettes    Start date: 01/2021    Quit date: 03/2021    Years since quitting: 3.2    Passive exposure: Past   Smokeless Tobacco Never     Social History     Substance and Sexual Activity   Drug Use No     Family History:  Family History   Problem Relation Age of Onset    Heart disease Father     Diabetes Father     Hypertension Mother     Heart disease Mother      Physical Exam:     Vitals:   Blood Pressure: 130/64 (06/03/24 1530)  Pulse: 63 (06/03/24 1530)  Temperature: 97.5 °F (36.4 °C) (06/03/24 1530)  Temp Source: Oral (06/03/24 0715)  Respirations: 18 (06/03/24  1530)  SpO2: (!) 87 % (06/03/24 1530)    Physical Exam  Constitutional:       Appearance: He is obese.   HENT:      Head: Normocephalic.      Mouth/Throat:      Mouth: Mucous membranes are moist.   Eyes:      Extraocular Movements: Extraocular movements intact.   Pulmonary:      Effort: Pulmonary effort is normal.      Comments: Diminished secondary to body habitus  Musculoskeletal:      Cervical back: Normal range of motion.      Comments: Left lower extremity lymphedema   Neurological:      Mental Status: He is alert and oriented to person, place, and time.   Psychiatric:         Mood and Affect: Mood normal.       Additional Data:     Lab Results:  Results from last 7 days   Lab Units 06/03/24  0714   WBC Thousand/uL 9.91   HEMOGLOBIN g/dL 9.0*   HEMATOCRIT % 27.7*   PLATELETS Thousands/uL 327   SEGS PCT % 82*   LYMPHO PCT % 10*   MONO PCT % 5   EOS PCT % 2     Results from last 7 days   Lab Units 06/03/24  0714   SODIUM mmol/L 138   POTASSIUM mmol/L 3.8   CHLORIDE mmol/L 104   CO2 mmol/L 21   BUN mg/dL 62*   CREATININE mg/dL 6.40*   ANION GAP mmol/L 13   CALCIUM mg/dL 8.1*   ALBUMIN g/dL 3.0*   TOTAL BILIRUBIN mg/dL 0.35   ALK PHOS U/L 72   ALT U/L 9   AST U/L 13   GLUCOSE RANDOM mg/dL 116     Results from last 7 days   Lab Units 06/03/24  0758   INR  1.10     Results from last 7 days   Lab Units 06/03/24  1622 06/03/24  1124   POC GLUCOSE mg/dl 136 88     Lines/Drains:  Invasive Devices       Peripheral Intravenous Line  Duration             Peripheral IV 06/03/24 Right Antecubital <1 day                  CT chest abdomen pelvis wo contrast   Final Result by Margoth Arango MD (06/03 1555)      No acute pulmonary pathology.      No acute abdominal or pelvic pathology within limitation of a noncontrast study. In particular, no hydronephrosis.      Left inguinal and left external iliac lymphadenopathy, increased from May 2022. This may be reactive to a process in the left lower extremity or in the partially  imaged pannus.      Additional findings as above.                  Workstation performed: [id number]         NM lung ventilation / perfusion   Final Result by Daniel Ackerman MD (06/03 1412)      The probability for pulmonary embolus is low.      Workstation performed: [id number]         XR chest 1 view portable   ED Interpretation by Kit Antoine MD (06/03 0753)   Mild pulmonary edema right worse than left, no additional acute cardiopulmonary abnormality      Final Result by Miah Toledo MD (06/03 0841)      No acute cardiopulmonary disease.            Workstation performed: [id number]           ** Please Note: This note has been constructed using a voice recognition system. **

## 2024-06-03 NOTE — CONSULTS
Consultation - Cardiology   [facility]     Joaquin Frankel 44 y.o. male MRN: [id number]  : 1980  Unit/Bed#: [address] [place] [address] Encounter: [id number]    Physician Requesting Consult: Les Batista MD  Reason for Consult / Principal Problem: Chest pain and SOB        ASSESSMENT:    Admitted on this occasion for progressive RICHARDSON and exertional chest pain. Presenting EKG showed no acute diagnostic ischemic changes; hs troponin negative x2.   CAD. NSTEMI in 2020 s/p PCI to RCA then. Hx of previous PCI to LCX in .   Echo 2021: LVEF 65%.   Acute renal failure.   Elevated D dimer.   Hypertensive urgency on arrival.   Morbid obesity with chronic lymphedema in the LLE.   Hx of L groin cellulitis and mass with skin grafting.  PMHx includes HLD, DM II, hx of tobacco use (reported stopping 3 years ago).     EKG showed sinus rhythm with PVCs. No diagnostic acute ischemic changes.   Hs troponin negative x2 thus far.  .   D dimer elevated.     PLAN AND RECOMMENDATIONS:  He has clinical, lab and radiographic evidence of volume overload and is in acute renal failure which may be cardiorenal. Spoke to nephrology and the plan is to challenge him with IV diuretics and assess response.   His blood pressure is under much better controlled. Continue amlodipine 10 mg daily, Coreg 6.25 mg daily.   He is on a heparin drip for empiric PE treatment; had elevated D dimer and could not have CTA due to renal failure. Underwent VQ scan, results pending.   Consider repeat ischemic workup when volume status is stable. The patient was due to undergo repeat stress testing after most recent cardiology office visit in May 2024.   Update echocardiogram.   We will follow.           History of Present Illness   HPI: Joaquin Frankel is a 44 y.o. year old male with past medical history of CAD, obesity, lymphedema, DM II, HTN, HLD, and tobacco use who presents for exertional dyspnea and chest pain.  "    He states that 3 weeks ago, he started to have pain on the left side of his chest near the shoulder which radiated up to the neck.  This occurred anytime he walked or did any physical activity.  There is associated dyspnea with exertion.  His symptoms progressed over the course of 3 weeks to where he experienced chest pain and shortness of breath walking from his bedroom to the bathroom.  The chest pain usually resolves after resting for 10 minutes.  The patient thinks the symptoms are similar to when he had his previous heart attack but the difference is they resolve quicker and are not lasting as long. Has had some sporadic lightheadedness but no near-syncope or syncope.  Denied abdominal pain, nausea, vomiting, GI bleeding.  He has chronic left lower extremity edema due to lymphedema which he thinks has been worse recently.      Review of Systems:  Constitutional: denies fevers  Eyes: no changes in vision  Ears: no changes in hearing  Respiratory: + exertional dyspnea  Cardiovascular: denies  palpitations. + for exertional chest pain and LE edema.   Gastrointestinal: denies abdominal pain,GI bleeding  : denies hematuria  Neurologic: no near syncope, syncope  Hematologic: denies excessive bleeding issues  Integumentary: denies rashes      Historical Information   Past Medical History:   Diagnosis Date    Acute hypoxic respiratory failure ([facility]) 10/07/2023    Arthritis     Breathing difficulty     Cellulitis 10/07/2023    Coronary artery disease     Diabetes mellitus ([facility])     Diabetic neuropathy ([facility]) 05/28/2021    Heart disease     CAD   s/p ptca with 1 stent 2012    Hidradenitis suppurativa     groin    History of transfusion     Hyperlipidemia     Hypertension     MI (myocardial infarction) ([facility])     \" silent \" M.I. in the past    Morbid obesity with BMI of 50.0-59.9, adult ([facility])     Nicotine dependence     Obesity     Pilonidal cyst     Type 1 non-ST elevation myocardial infarction (NSTEMI) ([facility]) " 11/29/2020     Past Surgical History:   Procedure Laterality Date    CARDIAC SURGERY      CORONARY ANGIOPLASTY WITH STENT PLACEMENT      INCISION AND DRAINAGE OF WOUND N/A 1/24/2017    Procedure: INCISION AND DRAINAGE (I&D) BUTTOCK, PILONIDAL CYST;  Surgeon: Simba Adhikari MD;  Location: [facility];  Service:     LEG SURGERY Left     I&D of left leg 2012    IL DEBRIDEMENT SUBCUTANEOUS TISSUE 1ST 20 SQ CM/< Left 7/6/2021    Procedure: DEBRIDEMENT WOUND (WASH OUT);  Surgeon: Sudheer Mcfarlane MD;  [facility];  Service: General    IL EXC B9 LESION MRGN XCP SK TG T/A/L >4.0 CM Left 4/6/2021    Procedure: EXCISION THIGH MASS;  Surgeon: Sudheer Mcfarlane MD;  [facility];  Service: General    IL EXCISION H/P/P/U COMPLEX REPAIR Left 7/6/2021    Procedure: EXCISION PERINEAL ABSCESS LEFT THIGH;  Surgeon: Sudheer Mcfarlane MD;  [facility];  Service: General    IL NEGATIVE PRESSURE WOUND THERAPY DME <= 50 SQ CM Left 4/6/2021    Procedure: APPLICATION VAC DRESSING THIGH;  Surgeon: Sudheer Mcfarlane MD;  [facility];  Service: General    WOUND DEBRIDEMENT Left 4/17/2021    Procedure: DEBRIDEMENT WOUND AND DRESSING CHANGE (WASH OUT);  Surgeon: Mahin Traore DO;  [facility];  Service: General    WOUND DEBRIDEMENT Left 4/22/2021    Procedure: DEBRIDEMENT LOWER EXTREMITY (WASH OUT), left groin and thigh;  Surgeon: Sudheer Mcfarlane MD;  [facility];  Service: General    WOUND DEBRIDEMENT Left 5/26/2021    Procedure: DEBRIDEMENT LOWER EXTREMITY (WASH OUT);  Surgeon: Zak Castanon DO;  [facility];  Service: General    WOUND DEBRIDEMENT Left 5/28/2021    Procedure: Washout left thigh wound and closure;  Surgeon: Amor Jaramillo DO;  [facility];  Service: General     Social History     Substance and Sexual Activity   Alcohol Use Not Currently    Comment: rarely     Social History     Substance and Sexual Activity   Drug Use No     Social History     Tobacco Use   Smoking Status Former     Current packs/day: 0.00    Average packs/day: 1.5 packs/day for 20.0 years (29.9 ttl pk-yrs)    Types: Cigarettes    Start date: 01/2021    Quit date: 03/2021    Years since quitting: 3.2    Passive exposure: Past   Smokeless Tobacco Never       Family History:   Family History   Problem Relation Age of Onset    Heart disease Father     Diabetes Father     Hypertension Mother     Heart disease Mother        Meds/Allergies   current meds:   Current Facility-Administered Medications   Medication Dose Route Frequency    acetaminophen (TYLENOL) tablet 650 mg  650 mg Oral Q6H PRN    [START ON 6/4/2024] amLODIPine (NORVASC) tablet 10 mg  10 mg Oral Daily    [START ON 6/4/2024] aspirin chewable tablet 81 mg  81 mg Oral Daily    atorvastatin (LIPITOR) tablet 80 mg  80 mg Oral Daily    carvedilol (COREG) tablet 6.25 mg  6.25 mg Oral BID With Meals    furosemide (LASIX) injection 80 mg  80 mg Intravenous Once    heparin (porcine) 25,000 units in 0.45% NaCl 250 mL infusion (premix)  3-30 Units/kg/hr (Order-Specific) Intravenous Titrated    insulin glargine (LANTUS) subcutaneous injection 12 Units 0.12 mL  12 Units Subcutaneous HS    insulin lispro (HumALOG/ADMELOG) 100 units/mL subcutaneous injection 1-6 Units  1-6 Units Subcutaneous HS    insulin lispro (HumALOG/ADMELOG) 100 units/mL subcutaneous injection 2-12 Units  2-12 Units Subcutaneous TID AC    traMADol (ULTRAM) tablet 50 mg  50 mg Oral Q12H PRN     Allergies   Allergen Reactions    Keflex [Cephalexin] Facial Swelling and Lip Swelling    Amoxicillin Hives     childhood       Objective   Vitals: Blood pressure 123/67, pulse 67, temperature 98.1 °F (36.7 °C), temperature source Oral, resp. rate 20, SpO2 (!) 88%., There is no height or weight on file to calculate BMI.     Physical Exam:  General: pleasant, comfortable, in no acute distress  Neurologic: speech is coherent, alert and oriented x3, moves all 4 extremities  Head: normocephalic  Eyes: no  conjunctivitis  Neck: supple, no JVD  Cardiovascular: regular S1 and S2, no murmurs  Pulmonary: Diminished breath sounds bilaterally; no apparent rales or wheezes though exam is limited due to body habitus.   Skin: no rashes  Abdomen: soft, nontender, obese  Extremities: LLE lymphedema. 1-2+ pitting edema RLE    Lab Results:     Troponins:   Results from last 7 days   Lab Units 06/03/24  1240   CK TOTAL U/L 267       CBC with diff:   Results from last 7 days   Lab Units 06/03/24  0714   WBC Thousand/uL 9.91   HEMOGLOBIN g/dL 9.0*   HEMATOCRIT % 27.7*   MCV fL 85   PLATELETS Thousands/uL 327   RBC Million/uL 3.25*   MCH pg 27.7   MCHC g/dL 32.5   RDW % 14.4   MPV fL 8.7*   NRBC AUTO /100 WBCs 0       CMP:   Results from last 7 days   Lab Units 06/03/24  0714   POTASSIUM mmol/L 3.8   CHLORIDE mmol/L 104   CO2 mmol/L 21   BUN mg/dL 62*   CREATININE mg/dL 6.40*   CALCIUM mg/dL 8.1*   AST U/L 13   ALT U/L 9   ALK PHOS U/L 72   EGFR ml/min/1.73sq m 9       Magnesium:       Coags:   Results from last 7 days   Lab Units 06/03/24  0758   PTT seconds 34   INR  1.10       Lipid Profile:         Cardiac testing:   All relevant testing has been reviewed.  See HPI for summary.        Jose De Jesus Cardozo PA-C  6/3/2024  2:27 PM   [facility]    This note was completed in part utilizing direct voice dictation software.  Grammatical errors, random word insertions, spelling mistakes, and incomplete sentences may be an occasional consequence of the system secondary to software limitations, ambient noise and hardware issues. Please read the chart carefully and recognize, using context, where substitutions have occurred.

## 2024-06-03 NOTE — CONSULTS
NEPHROLOGY HOSPITAL CONSULTATION   Joaquin Frankel 44 y.o. male MRN: [id number]  Unit/Bed#: [facility] Encounter: [id number]    ASSESSMENT and PLAN:  Joaquin Frankel is a 44 y.o. male who was admitted to [facility] after presenting with shortness of breath and chest pain. A renal consultation is requested today for assistance in the management of CLOVIS.    CLOVIS.  Etiology of CLOVIS most likely cardiorenal syndrome on baseline of diabetic nephropathy +/- autoregulatory failure in setting of RAAS blockade +/- heavy NSAID use.  Baseline creatinine appears to be around 1.2-1.6.  Creatinine was rising as outpatient with most recent creatinine of 2.98 on 04/29/2024.  Creatinine on admission 6.40.  CK within normal limits.  Urinalysis pending.  Kidney imaging pending.  Cardiac BNP elevated at 233 but can be falsely low in setting of morbid obesity.  Chest x-ray with signs of pulmonary vascular congestion.  Check SPEP/UPEP/serum free light chain ratio.  Will expand serological workup based on results of urinalysis.  Give IV Lasix 80 mg x 1 and monitor response.  Hold lisinopril.  Avoid iodinated contrast.  No acute indication for renal replacement therapy.  Trend BMP.    2.  Chronic kidney disease.  Most likely diabetic nephropathy.  Baseline creatinine appears to be around 1.2-1.6.  UCR 5200 mg/g 04/2024.    3.  Hypertension.  Home medications include amlodipine 10 mg daily, Coreg 6.25 mg twice daily, lisinopril 20 mg daily.  Continue Coreg and amlodipine.  Hold lisinopril.    4.  Anemia in CKD.  Hemoglobin 9.0 on admission.  Check iron panel and ferritin for further evaluation.  Check SPEP/UPEP/serum free light chain ratio for further evaluation of anemia in setting of CLOVIS.    5.  Chest pain.  Currently on empiric IV heparin for possibility of pulmonary embolism.  VQ scan has been completed, results are pending.  Discussed with cardiology and echocardiogram will be ordered for further evaluation.    Discussed with  "internal medicine team.  After discussion, we agreed that patient has developed acute kidney injury in setting of volume overload and to provide IV Lasix 80 mg x 1 today.    HISTORY OF PRESENT ILLNESS:  Requesting Physician: Les Batista MD  Reason for Consult: CLOVIS    Joaquin Frankel is a 44 y.o. male who was admitted to [facility] after presenting with shortness of breath and chest pain. A renal consultation is requested today for assistance in the management of CLOVIS.  Patient has history of coronary artery disease, diabetes, CKD presenting for evaluation of 2 weeks of progressive dyspnea on exertion, exertional chest pain and lower extremity swelling.  He tells me that he has never seen nephrology but has been told by other physicians that he has kidney dysfunction.  He struggles with pain in his legs and has been taking Advil approximately 800 mg daily for several months.    PAST MEDICAL HISTORY:  Past Medical History:   Diagnosis Date    Acute hypoxic respiratory failure ([facility]) 10/07/2023    Arthritis     Breathing difficulty     Cellulitis 10/07/2023    Coronary artery disease     Diabetes mellitus ([facility])     Diabetic neuropathy ([facility]) 05/28/2021    Heart disease     CAD   s/p ptca with 1 stent 2012    Hidradenitis suppurativa     groin    History of transfusion     Hyperlipidemia     Hypertension     MI (myocardial infarction) ([facility])     \" silent \" M.I. in the past    Morbid obesity with BMI of 50.0-59.9, adult ([facility])     Nicotine dependence     Obesity     Pilonidal cyst     Type 1 non-ST elevation myocardial infarction (NSTEMI) ([facility]) 11/29/2020       PAST SURGICAL HISTORY:  Past Surgical History:   Procedure Laterality Date    CARDIAC SURGERY      CORONARY ANGIOPLASTY WITH STENT PLACEMENT      INCISION AND DRAINAGE OF WOUND N/A 1/24/2017    Procedure: INCISION AND DRAINAGE (I&D) BUTTOCK, PILONIDAL CYST;  Surgeon: Simba Adhikari MD;  Location: [facility];  Service:     LEG SURGERY Left     I&D " of left leg 2012    NH DEBRIDEMENT SUBCUTANEOUS TISSUE 1ST 20 SQ CM/< Left 7/6/2021    Procedure: DEBRIDEMENT WOUND (WASH OUT);  Surgeon: Sudheer Mcfarlane MD;  [facility];  Service: General    NH EXC B9 LESION MRGN XCP SK TG T/A/L >4.0 CM Left 4/6/2021    Procedure: EXCISION THIGH MASS;  Surgeon: Sudheer Mcfarlane MD;  [facility];  Service: General    NH EXCISION H/P/P/U COMPLEX REPAIR Left 7/6/2021    Procedure: EXCISION PERINEAL ABSCESS LEFT THIGH;  Surgeon: Sudheer Mcfarlane MD;  [facility];  Service: General    NH NEGATIVE PRESSURE WOUND THERAPY DME <= 50 SQ CM Left 4/6/2021    Procedure: APPLICATION VAC DRESSING THIGH;  Surgeon: Sudheer Mcfarlane MD;  [facility];  Service: General    WOUND DEBRIDEMENT Left 4/17/2021    Procedure: DEBRIDEMENT WOUND AND DRESSING CHANGE (WASH OUT);  Surgeon: Mahin Traore DO;  [facility];  Service: General    WOUND DEBRIDEMENT Left 4/22/2021    Procedure: DEBRIDEMENT LOWER EXTREMITY (WASH OUT), left groin and thigh;  Surgeon: Sudheer Mcfarlane MD;  [facility];  Service: General    WOUND DEBRIDEMENT Left 5/26/2021    Procedure: DEBRIDEMENT LOWER EXTREMITY (WASH OUT);  Surgeon: Zak Castanon DO;  [facility];  Service: General    WOUND DEBRIDEMENT Left 5/28/2021    Procedure: Washout left thigh wound and closure;  Surgeon: Amor Jaramillo DO;  [facility];  Service: General       ALLERGIES:  Allergies   Allergen Reactions    Keflex [Cephalexin] Facial Swelling and Lip Swelling    Amoxicillin Hives     childhood       SOCIAL HISTORY:  Social History     Substance and Sexual Activity   Alcohol Use Not Currently    Comment: rarely     Social History     Substance and Sexual Activity   Drug Use No     Social History     Tobacco Use   Smoking Status Former    Current packs/day: 0.00    Average packs/day: 1.5 packs/day for 20.0 years (29.9 ttl pk-yrs)    Types: Cigarettes    Start date: 01/2021    Quit date: 03/2021    Years since  quitting: 3.2    Passive exposure: Past   Smokeless Tobacco Never       FAMILY HISTORY:  Family History   Problem Relation Age of Onset    Heart disease Father     Diabetes Father     Hypertension Mother     Heart disease Mother        MEDICATIONS:    Current Facility-Administered Medications:     heparin (porcine) 25,000 units in 0.45% NaCl 250 mL infusion (premix), 3-30 Units/kg/hr (Order-Specific), Intravenous, Titrated, Kit Antoine MD, Last Rate: 22.5 mL/hr at 06/03/24 1020, 18 Units/kg/hr at 06/03/24 1020    Current Outpatient Medications:     amLODIPine (NORVASC) 10 mg tablet, Take 1 tablet (10 mg total) by mouth daily, Disp: 30 tablet, Rfl: 3    aspirin (ECOTRIN LOW STRENGTH) 81 mg EC tablet, Take 1 tablet (81 mg total) by mouth daily, Disp: 30 tablet, Rfl: 0    atorvastatin (LIPITOR) 80 mg tablet, Take 1 tablet (80 mg total) by mouth daily, Disp: 90 tablet, Rfl: 3    carvedilol (COREG) 6.25 mg tablet, Take 1 tablet (6.25 mg total) by mouth 2 (two) times a day with meals, Disp: 60 tablet, Rfl: 3    glimepiride (AMARYL) 2 mg tablet, Take 1 tablet (2 mg total) by mouth daily with breakfast, Disp: 30 tablet, Rfl: 3    lisinopril (ZESTRIL) 20 mg tablet, Take 1 tablet (20 mg total) by mouth daily, Disp: 30 tablet, Rfl: 3    EPINEPHrine (EPIPEN) 0.3 mg/0.3 mL SOAJ, Inject 0.3 mL (0.3 mg total) into a muscle once for 1 dose, Disp: 0.6 mL, Rfl: 0    insulin glargine (LANTUS) 100 units/mL subcutaneous injection, Inject 12 Units under the skin daily at bedtime Do not start before February 21, 2024., Disp: 10 mL, Rfl: 0    Insulin Pen Needle (BD ULTRA-FINE PEN NEEDLES) 29G X 12.7MM MISC, USE 1 NEEDLE ONCE DAILY FOR INJECTION UNDER THE SKIN, Disp: 50 each, Rfl: 3    REVIEW OF SYSTEMS:  Review of Systems   Constitutional:  Negative for chills and fever.   HENT:  Negative for ear pain and sore throat.    Eyes:  Negative for pain and visual disturbance.   Respiratory:  Positive for shortness of breath. Negative for  cough.    Cardiovascular:  Positive for chest pain and leg swelling. Negative for palpitations.   Gastrointestinal:  Negative for abdominal pain and vomiting.   Genitourinary:  Negative for dysuria and hematuria.   Musculoskeletal:  Negative for arthralgias and back pain.   Skin:  Negative for color change and rash.   Neurological:  Negative for seizures and syncope.   All other systems reviewed and are negative.      PHYSICAL EXAM:  Current Weight:    First Weight:    Vitals:    06/03/24 0915 06/03/24 0945 06/03/24 1000 06/03/24 1032   BP: 154/72 144/64 137/65 155/77   BP Location:    Left arm   Pulse: 70 70 72 74   Resp:    20   Temp:       TempSrc:       SpO2: 93% 93% 95% 94%       Intake/Output Summary (Last 24 hours) at 6/3/2024 1043  Last data filed at 6/3/2024 0800  Gross per 24 hour   Intake 100 ml   Output --   Net 100 ml     Physical Exam  Constitutional:       Appearance: Normal appearance.   HENT:      Head: Normocephalic and atraumatic.   Cardiovascular:      Rate and Rhythm: Normal rate and regular rhythm.   Pulmonary:      Effort: Pulmonary effort is normal.      Breath sounds: Normal breath sounds.   Abdominal:      Palpations: Abdomen is soft.   Musculoskeletal:         General: Normal range of motion.      Right lower leg: Edema present.      Left lower leg: Edema present.      Comments: Left lower extremity lymphedema   Skin:     General: Skin is warm.   Neurological:      Mental Status: He is alert and oriented to person, place, and time. Mental status is at baseline.   Psychiatric:         Mood and Affect: Mood normal.       Lab Results:   Results from last 7 days   Lab Units 06/03/24  0714   WBC Thousand/uL 9.91   HEMOGLOBIN g/dL 9.0*   HEMATOCRIT % 27.7*   PLATELETS Thousands/uL 327   POTASSIUM mmol/L 3.8   CHLORIDE mmol/L 104   CO2 mmol/L 21   BUN mg/dL 62*   CREATININE mg/dL 6.40*   CALCIUM mg/dL 8.1*   ALK PHOS U/L 72   ALT U/L 9   AST U/L 13     Portions of the record may have been created  "with voice recognition software. Occasional wrong word or \"sound a like\" substitutions may have occurred due to the inherent limitations of voice recognition software. Read the chart carefully and recognize, using context, where substitutions have occurred. If you have any questions, please contact the dictatinAnd monitor responseg provider.    "

## 2024-06-03 NOTE — ASSESSMENT & PLAN NOTE
Question of volume overload on admission however CT imaging is unremarkable. Patient is getting 80mg of IV lasix.  Will monitor response.  2D echocardiogram will also be performed.  Cardiology consulted as noted above.

## 2024-06-03 NOTE — ASSESSMENT & PLAN NOTE
Patient with a history of coronary artery disease with multivessel disease status post stents.  Cardiology is following as noted above.  Will follow-up with their recommendations.

## 2024-06-03 NOTE — PLAN OF CARE
Problem: Prexisting or High Potential for Compromised Skin Integrity  Goal: Skin integrity is maintained or improved  Description: INTERVENTIONS:  - Identify patients at risk for skin breakdown  - Assess and monitor skin integrity  - Assess and monitor nutrition and hydration status  - Monitor labs   - Assess for incontinence   - Turn and reposition patient  - Assist with mobility/ambulation  - Relieve pressure over bony prominences  - Avoid friction and shearing  - Provide appropriate hygiene as needed including keeping skin clean and dry  - Evaluate need for skin moisturizer/barrier cream  - Collaborate with interdisciplinary team   - Patient/family teaching  - Consider wound care consult   Outcome: Progressing     Problem: PAIN - ADULT  Goal: Verbalizes/displays adequate comfort level or baseline comfort level  Description: Interventions:  - Encourage patient to monitor pain and request assistance  - Assess pain using appropriate pain scale  - Administer analgesics based on type and severity of pain and evaluate response  - Implement non-pharmacological measures as appropriate and evaluate response  - Consider cultural and social influences on pain and pain management  - Notify physician/advanced practitioner if interventions unsuccessful or patient reports new pain  Outcome: Progressing     Problem: INFECTION - ADULT  Goal: Absence or prevention of progression during hospitalization  Description: INTERVENTIONS:  - Assess and monitor for signs and symptoms of infection  - Monitor lab/diagnostic results  - Monitor all insertion sites, i.e. indwelling lines, tubes, and drains  - Monitor endotracheal if appropriate and nasal secretions for changes in amount and color  - [place] appropriate cooling/warming therapies per order  - Administer medications as ordered  - Instruct and encourage patient and family to use good hand hygiene technique  - Identify and instruct in appropriate isolation precautions for  identified infection/condition  Outcome: Progressing  Goal: Absence of fever/infection during neutropenic period  Description: INTERVENTIONS:  - Monitor WBC    Outcome: Progressing     Problem: SAFETY ADULT  Goal: Patient will remain free of falls  Description: INTERVENTIONS:  - Educate patient/family on patient safety including physical limitations  - Instruct patient to call for assistance with activity   - Consult OT/PT to assist with strengthening/mobility   - Keep Call bell within reach  - Keep bed low and locked with side rails adjusted as appropriate  - Keep care items and personal belongings within reach  - Initiate and maintain comfort rounds  - Make Fall Risk Sign visible to staff  - Offer Toileting every 2 Hours, in advance of need  - Initiate/Maintain bedalarm  - Obtain necessary fall risk management equipment:   - Apply yellow socks and bracelet for high fall risk patients  - Consider moving patient to room near nurses station  Outcome: Progressing  Goal: Maintain or return to baseline ADL function  Description: INTERVENTIONS:  -  Assess patient's ability to carry out ADLs; assess patient's baseline for ADL function and identify physical deficits which impact ability to perform ADLs (bathing, care of mouth/teeth, toileting, grooming, dressing, etc.)  - Assess/evaluate cause of self-care deficits   - Assess range of motion  - Assess patient's mobility; develop plan if impaired  - Assess patient's need for assistive devices and provide as appropriate  - Encourage maximum independence but intervene and supervise when necessary  - Involve family in performance of ADLs  - Assess for home care needs following discharge   - Consider OT consult to assist with ADL evaluation and planning for discharge  - Provide patient education as appropriate  Outcome: Progressing  Goal: Maintains/Returns to pre admission functional level  Description: INTERVENTIONS:  - Perform AM-PAC 6 Click Basic Mobility/ Daily Activity  assessment daily.  - Set and communicate daily mobility goal to care team and patient/family/caregiver.   - Collaborate with rehabilitation services on mobility goals if consulted  - Perform Range of Motion 3 times a day.  - Reposition patient every 2 hours.  - Dangle patient 3 times a day  - Stand patient 3 times a day  - Ambulate patient 3 times a day  - Out of bed to chair 3 times a day   - Out of bed for meals 3 times a day  - Out of bed for toileting  - Record patient progress and toleration of activity level   Outcome: Progressing     Problem: CARDIOVASCULAR - ADULT  Goal: Maintains optimal cardiac output and hemodynamic stability  Description: INTERVENTIONS:  - Monitor I/O, vital signs and rhythm  - Monitor for S/S and trends of decreased cardiac output  - Administer and titrate ordered vasoactive medications to optimize hemodynamic stability  - Assess quality of pulses, skin color and temperature  - Assess for signs of decreased coronary artery perfusion  - Instruct patient to report change in severity of symptoms  Outcome: Progressing  Goal: Absence of cardiac dysrhythmias or at baseline rhythm  Description: INTERVENTIONS:  - Continuous cardiac monitoring, vital signs, obtain 12 lead EKG if ordered  - Administer antiarrhythmic and heart rate control medications as ordered  - Monitor electrolytes and administer replacement therapy as ordered  Outcome: Progressing     Problem: RESPIRATORY - ADULT  Goal: Achieves optimal ventilation and oxygenation  Description: INTERVENTIONS:  - Assess for changes in respiratory status  - Assess for changes in mentation and behavior  - Position to facilitate oxygenation and minimize respiratory effort  - Oxygen administered by appropriate delivery if ordered  - Initiate smoking cessation education as indicated  - Encourage broncho-pulmonary hygiene including cough, deep breathe, Incentive Spirometry  - Assess the need for suctioning and aspirate as needed  - Assess and  instruct to report SOB or any respiratory difficulty  - Respiratory Therapy support as indicated  Outcome: Progressing     Problem: SKIN/TISSUE INTEGRITY - ADULT  Goal: Skin Integrity remains intact(Skin Breakdown Prevention)  Description: Assess:  -Perform Juan assessment every shift  -Clean and moisturize skin every shift  -Inspect skin when repositioning, toileting, and assisting with ADLS  -Assess under medical devices such as IV every shift  -Assess extremities for adequate circulation and sensation     Bed Management:  -Have minimal linens on bed & keep smooth, unwrinkled  -Change linens as needed when moist or perspiring  -Avoid sitting or lying in one position for more than 2 hours while in bed  -Keep HOB at 30degrees       Activity:  -Mobilize patient 3 times a day  -Encourage activity and walks on unit  -Encourage or provide ROM exercises   -Turn and reposition patient every 2 Hours  -Use appropriate equipment to lift or move patient in bed  -Instruct/ Assist with weight shifting every 2hr when out of bed in chair  -Consider limitation of chair time 30 min  hour intervals    Skin Care:  -Avoid use of baby powder, tape, friction and shearing, hot water or constrictive clothing  -Do not massage red bony areas    Next Steps:    Outcome: Progressing  Goal: Incision(s), wounds(s) or drain site(s) healing without S/S of infection  Description: INTERVENTIONS  - Assess and document dressing, incision, wound bed, drain sites and surrounding tissue  - Provide patient and family education    Outcome: Progressing

## 2024-06-03 NOTE — ED PROVIDER NOTES
History  Chief Complaint   Patient presents with    Chest Pain    Shortness of Breath     Pt reports of cp with dyspnea on exertion for 2 weeks     HPI  Patient is a 44-year-old male history of CAD with multivessel stenting, diabetes, CKD presenting for evaluation of 2 weeks of progressive dyspnea on exertion, exertional chest pain.  Patient notes worsening redness and swelling of left worse than right lower extremity.  Patient denies fevers, chills, cough.  Patient denies any symptoms at rest.  Patient denies nausea, vomiting, diaphoresis, syncope or near syncope.  Prior to Admission Medications   Prescriptions Last Dose Informant Patient Reported? Taking?   EPINEPHrine (EPIPEN) 0.3 mg/0.3 mL SOAJ   No No   Sig: Inject 0.3 mL (0.3 mg total) into a muscle once for 1 dose   Insulin Pen Needle (BD ULTRA-FINE PEN NEEDLES) 29G X 12.7MM MISC  Self No No   Sig: USE 1 NEEDLE ONCE DAILY FOR INJECTION UNDER THE SKIN   amLODIPine (NORVASC) 10 mg tablet 6/3/2024 at 0500  No Yes   Sig: Take 1 tablet (10 mg total) by mouth daily   aspirin (ECOTRIN LOW STRENGTH) 81 mg EC tablet 6/3/2024 Self No Yes   Sig: Take 1 tablet (81 mg total) by mouth daily   atorvastatin (LIPITOR) 80 mg tablet 6/2/2024 Self No Yes   Sig: Take 1 tablet (80 mg total) by mouth daily   carvedilol (COREG) 6.25 mg tablet 6/3/2024 at 0500 Self No Yes   Sig: Take 1 tablet (6.25 mg total) by mouth 2 (two) times a day with meals   glimepiride (AMARYL) 2 mg tablet 6/2/2024 Self No Yes   Sig: Take 1 tablet (2 mg total) by mouth daily with breakfast   insulin glargine (LANTUS) 100 units/mL subcutaneous injection Unknown Self No No   Sig: Inject 12 Units under the skin daily at bedtime Do not start before February 21, 2024.   lisinopril (ZESTRIL) 20 mg tablet 6/3/2024 at 0500  No Yes   Sig: Take 1 tablet (20 mg total) by mouth daily      Facility-Administered Medications: None       Past Medical History:   Diagnosis Date    Acute hypoxic respiratory failure (HCC)  "10/07/2023    Arthritis     Breathing difficulty     Cellulitis 10/07/2023    Coronary artery disease     Diabetes mellitus (HCC)     Diabetic neuropathy ([facility]) 05/28/2021    Heart disease     CAD   s/p ptca with 1 stent 2012    Hidradenitis suppurativa     groin    History of transfusion     Hyperlipidemia     Hypertension     MI (myocardial infarction) ([facility])     \" silent \" M.I. in the past    Morbid obesity with BMI of 50.0-59.9, adult ([facility])     Nicotine dependence     Obesity     Pilonidal cyst     Type 1 non-ST elevation myocardial infarction (NSTEMI) ([facility]) 11/29/2020       Past Surgical History:   Procedure Laterality Date    CARDIAC SURGERY      CORONARY ANGIOPLASTY WITH STENT PLACEMENT      INCISION AND DRAINAGE OF WOUND N/A 1/24/2017    Procedure: INCISION AND DRAINAGE (I&D) BUTTOCK, PILONIDAL CYST;  Surgeon: Simba Adhikari MD;  Location: [facility];  Service:     LEG SURGERY Left     I&D of left leg 2012    GA DEBRIDEMENT SUBCUTANEOUS TISSUE 1ST 20 SQ CM/< Left 7/6/2021    Procedure: DEBRIDEMENT WOUND (WASH OUT);  Surgeon: Sudheer Mcfarlane MD;  Location: [facility];  Service: General    GA EXC B9 LESION MRGN XCP SK TG T/A/L >4.0 CM Left 4/6/2021    Procedure: EXCISION THIGH MASS;  Surgeon: Sudheer Mcfarlane MD;  [facility]: [facility];  Service: General    GA EXCISION H/P/P/U COMPLEX REPAIR Left 7/6/2021    Procedure: EXCISION PERINEAL ABSCESS LEFT THIGH;  Surgeon: Sudheer Mcfarlane MD;  [facility]: [facility];  Service: General    GA NEGATIVE PRESSURE WOUND THERAPY DME <= 50 SQ CM Left 4/6/2021    Procedure: APPLICATION VAC DRESSING THIGH;  Surgeon: Sudheer Mcfarlane MD;  [facility];  Service: General    WOUND DEBRIDEMENT Left 4/17/2021    Procedure: DEBRIDEMENT WOUND AND DRESSING CHANGE (WASH OUT);  Surgeon: Mahin Traore DO;  [facility]: [facility];  Service: General    WOUND DEBRIDEMENT Left 4/22/2021    Procedure: DEBRIDEMENT LOWER EXTREMITY (WASH OUT), left groin and thigh;  Surgeon: Sudheer Mcfarlane MD;  [facility];  " Service: General    WOUND DEBRIDEMENT Left 5/26/2021    Procedure: DEBRIDEMENT LOWER EXTREMITY (WASH OUT);  Surgeon: Zak Castanon DO;  [facility];  Service: General    WOUND DEBRIDEMENT Left 5/28/2021    Procedure: Washout left thigh wound and closure;  Surgeon: Amor Jaramillo DO;  [facility]: [facility];  Service: General       Family History   Problem Relation Age of Onset    Heart disease Father     Diabetes Father     Hypertension Mother     Heart disease Mother      I have reviewed and agree with the history as documented.    E-Cigarette/Vaping    E-Cigarette Use Never User      E-Cigarette/Vaping Substances    Nicotine No     THC No     CBD No     Flavoring No      Social History     Tobacco Use    Smoking status: Former     Current packs/day: 0.00     Average packs/day: 1.5 packs/day for 20.0 years (29.9 ttl pk-yrs)     Types: Cigarettes     Start date: 01/2021     Quit date: 03/2021     Years since quitting: 3.2     Passive exposure: Past    Smokeless tobacco: Never   Vaping Use    Vaping status: Never Used   Substance Use Topics    Alcohol use: Not Currently     Comment: rarely    Drug use: No       Review of Systems   Constitutional:  Negative for chills, fatigue and fever.   Respiratory:  Positive for shortness of breath. Negative for cough.    Cardiovascular:  Positive for chest pain and leg swelling.   Gastrointestinal:  Negative for diarrhea, nausea and vomiting.   Genitourinary:  Negative for flank pain and frequency.   Musculoskeletal:  Negative for arthralgias.   Neurological:  Negative for weakness, numbness and headaches.   Psychiatric/Behavioral:  Negative for confusion.    All other systems reviewed and are negative.      Physical Exam  Physical Exam  Vitals and nursing note reviewed.   Constitutional:       General: He is not in acute distress.     Appearance: Normal appearance. He is not ill-appearing, toxic-appearing or diaphoretic.      Comments: Chronically ill-appearing  but nontoxic nondistressed.  Super morbidly obese   HENT:      Head: Normocephalic and atraumatic.      Comments: Moist mucous membranes     Right Ear: External ear normal.      Left Ear: External ear normal.   Eyes:      General:         Right eye: No discharge.         Left eye: No discharge.   Cardiovascular:      Comments: Regular rate and rhythm, no murmurs rubs or gallops.  Extremities warm and well-perfused without mottling  Pulmonary:      Effort: No respiratory distress.      Comments: Satting 90% on room air, intermittently dropping to 89%.  Lungs clear to auscultation bilaterally, no wheezes, rales, rhonchi  Abdominal:      General: There is no distension.   Musculoskeletal:         General: No deformity.      Cervical back: Normal range of motion.      Comments: Erythema and swelling left lower extremity.  Pitting edema right lower extremity.   Skin:     Findings: No lesion or rash.   Neurological:      Mental Status: He is alert and oriented to person, place, and time. Mental status is at baseline.      Comments: Awake, alert, pleasant, interactive   Psychiatric:         Mood and Affect: Mood and affect normal.         Vital Signs  ED Triage Vitals   Temperature Pulse Respirations Blood Pressure SpO2   06/03/24 0715 06/03/24 0703 06/03/24 0703 06/03/24 0703 06/03/24 0703   98.1 °F (36.7 °C) 96 20 (!) 204/94 93 %      Temp Source Heart Rate Source Patient Position - Orthostatic VS BP Location FiO2 (%)   06/03/24 0715 06/03/24 0703 06/03/24 0703 06/03/24 0703 --   Oral Monitor Sitting Right arm       Pain Score       06/03/24 0850       9           Vitals:    06/03/24 0800 06/03/24 0815 06/03/24 0845 06/03/24 0915   BP: (!) 172/82 144/71 161/72 154/72   Pulse: 80 76 66 70   Patient Position - Orthostatic VS:             Visual Acuity      ED Medications  Medications   heparin (porcine) 25,000 units in 0.45% NaCl 250 mL infusion (premix) (has no administration in time range)   aspirin tablet 325 mg (325 mg  Oral Given 6/3/24 0716)   acetaminophen (Ofirmev) injection 1,000 mg (0 mg Intravenous Stopped 6/3/24 0800)   HYDROmorphone (DILAUDID) injection 1 mg (1 mg Intravenous Given 6/3/24 0850)       Diagnostic Studies  Results Reviewed       Procedure Component Value Units Date/Time    APTT [[id number]] Collected: 06/03/24 0758    Lab Status: In process Specimen: Blood from Arm, Right Updated: 06/03/24 0935    Protime-INR [[id number]] Collected: 06/03/24 0758    Lab Status: In process Specimen: Blood from Arm, Right Updated: 06/03/24 0935    HS Troponin I 2hr [[id number]]  (Normal) Collected: 06/03/24 0854    Lab Status: Final result Specimen: Blood from Arm, Right Updated: 06/03/24 0922     hs TnI 2hr 21 ng/L      Delta 2hr hsTnI -2 ng/L     D-dimer, quantitative [[id number]]  (Abnormal) Collected: 06/03/24 0758    Lab Status: Final result Specimen: Blood from Arm, Right Updated: 06/03/24 0915     D-Dimer, Quant 1.97 ug/ml FEU     HS Troponin I 4hr [[id number]]     Lab Status: No result Specimen: Blood     HS Troponin 0hr (reflex protocol) [[id number]]  (Normal) Collected: 06/03/24 0714    Lab Status: Final result Specimen: Blood from Arm, Right Updated: 06/03/24 0742     hs TnI 0hr 23 ng/L     Comprehensive metabolic panel [[id number]]  (Abnormal) Collected: 06/03/24 0714    Lab Status: Final result Specimen: Blood from Arm, Right Updated: 06/03/24 0735     Sodium 138 mmol/L      Potassium 3.8 mmol/L      Chloride 104 mmol/L      CO2 21 mmol/L      ANION GAP 13 mmol/L      BUN 62 mg/dL      Creatinine 6.40 mg/dL      Glucose 116 mg/dL      Calcium 8.1 mg/dL      Corrected Calcium 8.9 mg/dL      AST 13 U/L      ALT 9 U/L      Alkaline Phosphatase 72 U/L      Total Protein 7.6 g/dL      Albumin 3.0 g/dL      Total Bilirubin 0.35 mg/dL      eGFR 9 ml/min/1.73sq m     Narrative:      National Kidney Disease Foundation guidelines for Chronic Kidney Disease (CKD):     Stage 1 with normal or high GFR (GFR > 90 mL/min/1.73 square  meters)    Stage 2 Mild CKD (GFR = 60-89 mL/min/1.73 square meters)    Stage 3A Moderate CKD (GFR = 45-59 mL/min/1.73 square meters)    Stage 3B Moderate CKD (GFR = 30-44 mL/min/1.73 square meters)    Stage 4 Severe CKD (GFR = 15-29 mL/min/1.73 square meters)    Stage 5 End Stage CKD (GFR <15 mL/min/1.73 square meters)  Note: GFR calculation is accurate only with a steady state creatinine    CBC and differential [[id number]]  (Abnormal) Collected: 06/03/24 0714    Lab Status: Final result Specimen: Blood from Arm, Right Updated: 06/03/24 0719     WBC 9.91 Thousand/uL      RBC 3.25 Million/uL      Hemoglobin 9.0 g/dL      Hematocrit 27.7 %      MCV 85 fL      MCH 27.7 pg      MCHC 32.5 g/dL      RDW 14.4 %      MPV 8.7 fL      Platelets 327 Thousands/uL      nRBC 0 /100 WBCs      Segmented % 82 %      Immature Grans % 1 %      Lymphocytes % 10 %      Monocytes % 5 %      Eosinophils Relative 2 %      Basophils Relative 0 %      Absolute Neutrophils 8.18 Thousands/µL      Absolute Immature Grans 0.05 Thousand/uL      Absolute Lymphocytes 0.98 Thousands/µL      Absolute Monocytes 0.48 Thousand/µL      Eosinophils Absolute 0.18 Thousand/µL      Basophils Absolute 0.04 Thousands/µL                    XR chest 1 view portable   ED Interpretation by Kit Antoine MD (06/03 0754)   Mild pulmonary edema right worse than left, no additional acute cardiopulmonary abnormality      Final Result by Miah Toledo MD (06/03 0841)      No acute cardiopulmonary disease.            Workstation performed: [id number]         Nuclear Medicine ED order    (Results Pending)              Procedures  Procedures         ED Course             HEART Risk Score      Flowsheet Row Most Recent Value   Heart Score Risk Calculator    History 0 Filed at: 06/03/2024 0938   ECG 0 Filed at: 06/03/2024 0938   Age 0 Filed at: 06/03/2024 0938   Risk Factors 2 Filed at: 06/03/2024 0938   Troponin 0 Filed at: 06/03/2024 0938   HEART Score 2 Filed  at: 06/03/2024 0938                              Wells' Criteria for PE      Flowsheet Row Most Recent Value   Wells' Criteria for PE    Clinical signs and symptoms of DVT 3 Filed at: 06/03/2024 0938   PE is primary diagnosis or equally likely 0 Filed at: 06/03/2024 0938   HR >100 0 Filed at: 06/03/2024 0938   Immobilization at least 3 days or Surgery in the previous 4 weeks 0 Filed at: 06/03/2024 0938   Previous, objectively diagnosed PE or DVT 0 Filed at: 06/03/2024 0938   Hemoptysis 0 Filed at: 06/03/2024 0938   Malignancy with treatment within 6 months or palliative 0 Filed at: 06/03/2024 0938   Wells' Criteria Total 3 Filed at: 06/03/2024 0938                  Medical Decision Making  I obtained history from the patient.  I reviewed external medical documentation.  Patient was evaluated by primary care provider on 5/3/2024 for general evaluation, noted to have CKD 4, increased leg swelling, decreased urination, history of type 2 diabetes on insulin with recommended follow-up with endocrinology.  Differential diagnosis includes was not limited to ACS, CHF, DVT/PE, cellulitis left lower extremity, decompensated CKD with fluid retention.  I ordered and reviewed lab work including CBC, CMP, troponin, BNP, D-dimer.  I ordered and independently interpreted an EKG which demonstrates normal sinus rhythm rate of 90 without ST or T wave abnormalities with occasional PVCs.  I ordered and independently interpreted a chest x-ray which demonstrates mild pulmonary edema right worse than the left.  Patient with an elevated D-dimer and a creatinine of.  Chest x-ray demonstrates apparent pulmonary edema.6.4 from previous of 2.98 on 4/29/2024.  Unable to perform CTA PE study given patient's GFR of 9.  I discussed patient with the hospitalist, ordered a VQ scan, ordered empiric anticoagulation, and admitted patient for further evaluation and management.    Amount and/or Complexity of Data Reviewed  Labs: ordered.  Radiology:  ordered and independent interpretation performed.    Risk  OTC drugs.  Prescription drug management.  Decision regarding hospitalization.             Disposition  Final diagnoses:   Dyspnea on exertion   Chest pain   Hypoxia     Time reflects when diagnosis was documented in both MDM as applicable and the Disposition within this note       Time User Action Codes Description Comment    6/3/2024  9:36 AM Kit Antoine [R06.09] Dyspnea on exertion     6/3/2024  9:37 AM Kit Antoine Add [R07.9] Chest pain     6/3/2024  9:37 AM Kit Antoine Add [R09.02] Hypoxia           ED Disposition       ED Disposition   Admit    Condition   Stable    Date/Time   Mon Roman 3, 2024 0936    Comment   Case was discussed with GERRI and the patient's admission status was agreed to be Admission Status: inpatient status to the service of Dr. Batista .               Follow-up Information    None         Patient's Medications   Discharge Prescriptions    No medications on file       No discharge procedures on file.    PDMP Review         Value Time User    PDMP Reviewed  Yes 2/28/2024  4:53 PM Tuesday PETRA Villalobos            ED Provider  Electronically Signed by             Kit Antoine MD  06/03/24 8402

## 2024-06-03 NOTE — ASSESSMENT & PLAN NOTE
Patient with evidence of chest pain which could be secondary to volume overload or even a pulmonary embolism.  Patient does have a history of coronary artery disease as well so will monitor on telemetry and follow-up with recommendations from cardiology.

## 2024-06-03 NOTE — ASSESSMENT & PLAN NOTE
Patient was seen and examined in the emergency department and will be admitted to the hospital for further evaluation and management.  Patient presenting with evidence of acute kidney injury with a creatinine of 6.40.  Creatinine 1 month ago was 2.98 and 3 months ago was 1.22.  Question of possible volume overload on chest x-ray. Nephrology was consulted with recommendations for 80mg of IV Lasix. CT chest, abdomen, pelvis performed with no acute pulmonary or abdominal findings. There is evidence of left inguinal and left external iliac lymphadenopathy, increased from May 2022 which may be reactive to a process in the left lower extremity or in the partially imaged pannus. Monitor urinary response after Lasix administration. Repeat blood work in the morning. SPEP/UPEP/serum free light chin ratio ordered. Follow up with further recommendations from nephrology.

## 2024-06-03 NOTE — ASSESSMENT & PLAN NOTE
Patient with evidence of anemia with a hemoglobin level of 9.0.  No evidence of acute bleeding at this time.  Will check a stool occult blood.

## 2024-06-03 NOTE — ASSESSMENT & PLAN NOTE
Patient also with evidence of acute hypoxic respiratory failure.  As noted above, question of possible volume overload for which patient is being administered 80 mg of IV Lasix.  Follow-up with recommendations from pulmonology and nephrology.  Will also obtain a VQ scan to evaluate for pulmonary embolism.  Patient was started on a heparin drip in the emergency department.

## 2024-06-03 NOTE — PROGRESS NOTES
VQ scan with low probability for pulmonary embolism. Will discontinue the heparin drip and transition to heparin prophylaxis.

## 2024-06-03 NOTE — ASSESSMENT & PLAN NOTE
Lab Results   Component Value Date    HGBA1C 6.6 (H) 04/29/2024       Recent Labs     06/03/24  1124 06/03/24  1622   POCGLU 88 136       Blood Sugar Average: Last 72 hrs:  (P) 112    Patient will be placed on an insulin sliding scale.  Will continue to monitor blood sugar levels and make further adjustments as needed.  Will also continue patient's home dose of Lantus 12 units at bedtime.

## 2024-06-03 NOTE — ASSESSMENT & PLAN NOTE
Patient with an elevated D-dimer.  This could be elevated secondary to progressively worsening renal disease however there is also concern for underlying pulmonary embolism.  Will check a VQ scan.  Unable to perform CTA secondary to renal function.

## 2024-06-04 ENCOUNTER — TELEPHONE (OUTPATIENT)
Dept: CARDIOLOGY CLINIC | Facility: CLINIC | Age: 44
End: 2024-06-04

## 2024-06-04 ENCOUNTER — APPOINTMENT (INPATIENT)
Dept: NON INVASIVE DIAGNOSTICS | Facility: HOSPITAL | Age: 44
DRG: 673 | End: 2024-06-04
Payer: COMMERCIAL

## 2024-06-04 PROBLEM — D63.8 ANEMIA OF CHRONIC DISEASE: Status: ACTIVE | Noted: 2022-04-01

## 2024-06-04 LAB
ALBUMIN SERPL ELPH-MCNC: 2.42 G/DL (ref 3.2–5.1)
ALBUMIN SERPL ELPH-MCNC: 37.3 % (ref 48–70)
ALBUMIN UR ELPH-MCNC: 64.5 %
ALPHA1 GLOB MFR UR ELPH: 4.4 %
ALPHA1 GLOB SERPL ELPH-MCNC: 0.47 G/DL (ref 0.15–0.47)
ALPHA1 GLOB SERPL ELPH-MCNC: 7.3 % (ref 1.8–7)
ALPHA2 GLOB MFR UR ELPH: 7 %
ALPHA2 GLOB SERPL ELPH-MCNC: 1.11 G/DL (ref 0.42–1.04)
ALPHA2 GLOB SERPL ELPH-MCNC: 17.1 % (ref 5.9–14.9)
ANA SER QL IA: NEGATIVE
ANION GAP SERPL CALCULATED.3IONS-SCNC: 9 MMOL/L (ref 4–13)
AORTIC ROOT: 3 CM
APICAL FOUR CHAMBER EJECTION FRACTION: 58 %
ATRIAL RATE: 90 BPM
B-GLOBULIN MFR UR ELPH: 6.3 %
BETA GLOB ABNORMAL SERPL ELPH-MCNC: 0.42 G/DL (ref 0.31–0.57)
BETA1 GLOB SERPL ELPH-MCNC: 6.5 % (ref 4.7–7.7)
BETA2 GLOB SERPL ELPH-MCNC: 8 % (ref 3.1–7.9)
BETA2+GAMMA GLOB SERPL ELPH-MCNC: 0.52 G/DL (ref 0.2–0.58)
BSA FOR ECHO PROCEDURE: 2.74 M2
BUN SERPL-MCNC: 64 MG/DL (ref 5–25)
C3 SERPL-MCNC: 118 MG/DL (ref 87–200)
C4 SERPL-MCNC: 34 MG/DL (ref 19–52)
CALCIUM SERPL-MCNC: 7.9 MG/DL (ref 8.4–10.2)
CHLORIDE SERPL-SCNC: 104 MMOL/L (ref 96–108)
CO2 SERPL-SCNC: 21 MMOL/L (ref 21–32)
CREAT SERPL-MCNC: 6.87 MG/DL (ref 0.6–1.3)
E WAVE DECELERATION TIME: 230 MS
E/A RATIO: 1.49
ERYTHROCYTE [DISTWIDTH] IN BLOOD BY AUTOMATED COUNT: 14.7 % (ref 11.6–15.1)
FERRITIN SERPL-MCNC: 69 NG/ML (ref 24–336)
FRACTIONAL SHORTENING: 33 (ref 28–44)
GAMMA GLOB ABNORMAL SERPL ELPH-MCNC: 1.55 G/DL (ref 0.4–1.66)
GAMMA GLOB MFR UR ELPH: 17.8 %
GAMMA GLOB SERPL ELPH-MCNC: 23.8 % (ref 6.9–22.3)
GFR SERPL CREATININE-BSD FRML MDRD: 8 ML/MIN/1.73SQ M
GLUCOSE SERPL-MCNC: 114 MG/DL (ref 65–140)
GLUCOSE SERPL-MCNC: 122 MG/DL (ref 65–140)
GLUCOSE SERPL-MCNC: 82 MG/DL (ref 65–140)
GLUCOSE SERPL-MCNC: 82 MG/DL (ref 65–140)
GLUCOSE SERPL-MCNC: 96 MG/DL (ref 65–140)
HCT VFR BLD AUTO: 25 % (ref 36.5–49.3)
HGB BLD-MCNC: 8 G/DL (ref 12–17)
IGG/ALB SER: 0.59 {RATIO} (ref 1.1–1.8)
INTERPRETATION UR IFE-IMP: NORMAL
INTERPRETATION UR IFE-IMP: NORMAL
INTERVENTRICULAR SEPTUM IN DIASTOLE (PARASTERNAL SHORT AXIS VIEW): 1.4 CM
INTERVENTRICULAR SEPTUM: 1.4 CM (ref 0.6–1.1)
IRON SATN MFR SERPL: 17 % (ref 15–50)
IRON SERPL-MCNC: 38 UG/DL (ref 50–212)
LAAS-AP2: 17.9 CM2
LAAS-AP4: 24.8 CM2
LEFT ATRIUM SIZE: 4.8 CM
LEFT ATRIUM VOLUME (MOD BIPLANE): 69 ML
LEFT ATRIUM VOLUME INDEX (MOD BIPLANE): 25.2 ML/M2
LEFT INTERNAL DIMENSION IN SYSTOLE: 3.5 CM (ref 2.1–4)
LEFT VENTRICULAR INTERNAL DIMENSION IN DIASTOLE: 5.2 CM (ref 3.5–6)
LEFT VENTRICULAR POSTERIOR WALL IN END DIASTOLE: 1.4 CM
LEFT VENTRICULAR STROKE VOLUME: 77 ML
LVSV (TEICH): 77 ML
M PROTEIN 1 MFR SERPL ELPH: 4 %
M PROTEIN 1 SERPL ELPH-MCNC: 0.26 G/DL
M PROTEIN MFR UR ELPH: 18.3 MG/DL
M PROTEIN UR-MCNC: 2.2 %
MCH RBC QN AUTO: 28.4 PG (ref 26.8–34.3)
MCHC RBC AUTO-ENTMCNC: 32 G/DL (ref 31.4–37.4)
MCV RBC AUTO: 89 FL (ref 82–98)
MV E'TISSUE VEL-LAT: 11 CM/S
MV E'TISSUE VEL-SEP: 13 CM/S
MV PEAK A VEL: 0.87 M/S
MV PEAK E VEL: 130 CM/S
MV STENOSIS PRESSURE HALF TIME: 67 MS
MV VALVE AREA P 1/2 METHOD: 3.28
P AXIS: 59 DEGREES
PLATELET # BLD AUTO: 319 THOUSANDS/UL (ref 149–390)
PMV BLD AUTO: 9 FL (ref 8.9–12.7)
POTASSIUM SERPL-SCNC: 4.2 MMOL/L (ref 3.5–5.3)
PR INTERVAL: 160 MS
PROT PATTERN SERPL ELPH-IMP: ABNORMAL
PROT PATTERN UR ELPH-IMP: NORMAL
PROT SERPL-MCNC: 6.5 G/DL (ref 6.4–8.2)
PROT UR-MCNC: 831.5 MG/DL
QRS AXIS: -25 DEGREES
QRSD INTERVAL: 82 MS
QT INTERVAL: 368 MS
QTC INTERVAL: 450 MS
RA PRESSURE ESTIMATED: 8 MMHG
RBC # BLD AUTO: 2.82 MILLION/UL (ref 3.88–5.62)
RIGHT ATRIUM AREA SYSTOLE A4C: 20.8 CM2
RIGHT VENTRICLE ID DIMENSION: 3.9 CM
RV PSP: 58 MMHG
SL CV LEFT ATRIUM LENGTH A2C: 4.8 CM
SL CV LV EF: 58
SL CV PED ECHO LEFT VENTRICLE DIASTOLIC VOLUME (MOD BIPLANE) 2D: 129 ML
SL CV PED ECHO LEFT VENTRICLE SYSTOLIC VOLUME (MOD BIPLANE) 2D: 52 ML
SODIUM SERPL-SCNC: 134 MMOL/L (ref 135–147)
T WAVE AXIS: 61 DEGREES
TIBC SERPL-MCNC: 226 UG/DL (ref 250–450)
TR MAX PG: 50 MMHG
TR PEAK VELOCITY: 3.5 M/S
TRICUSPID ANNULAR PLANE SYSTOLIC EXCURSION: 2.6 CM
TRICUSPID VALVE PEAK REGURGITATION VELOCITY: 3.52 M/S
UIBC SERPL-MCNC: 188 UG/DL (ref 155–355)
VENTRICULAR RATE: 90 BPM
VIT B12 SERPL-MCNC: 310 PG/ML (ref 180–914)
WBC # BLD AUTO: 8.6 THOUSAND/UL (ref 4.31–10.16)

## 2024-06-04 PROCEDURE — 83550 IRON BINDING TEST: CPT | Performed by: INTERNAL MEDICINE

## 2024-06-04 PROCEDURE — 86160 COMPLEMENT ANTIGEN: CPT | Performed by: INTERNAL MEDICINE

## 2024-06-04 PROCEDURE — 86037 ANCA TITER EACH ANTIBODY: CPT | Performed by: INTERNAL MEDICINE

## 2024-06-04 PROCEDURE — 93010 ELECTROCARDIOGRAM REPORT: CPT | Performed by: INTERNAL MEDICINE

## 2024-06-04 PROCEDURE — 99233 SBSQ HOSP IP/OBS HIGH 50: CPT | Performed by: PHYSICIAN ASSISTANT

## 2024-06-04 PROCEDURE — 99232 SBSQ HOSP IP/OBS MODERATE 35: CPT | Performed by: INTERNAL MEDICINE

## 2024-06-04 PROCEDURE — 84165 PROTEIN E-PHORESIS SERUM: CPT | Performed by: PATHOLOGY

## 2024-06-04 PROCEDURE — C8929 TTE W OR WO FOL WCON,DOPPLER: HCPCS

## 2024-06-04 PROCEDURE — 85027 COMPLETE CBC AUTOMATED: CPT | Performed by: INTERNAL MEDICINE

## 2024-06-04 PROCEDURE — 82728 ASSAY OF FERRITIN: CPT | Performed by: INTERNAL MEDICINE

## 2024-06-04 PROCEDURE — 86225 DNA ANTIBODY NATIVE: CPT | Performed by: INTERNAL MEDICINE

## 2024-06-04 PROCEDURE — 83540 ASSAY OF IRON: CPT | Performed by: INTERNAL MEDICINE

## 2024-06-04 PROCEDURE — 83520 IMMUNOASSAY QUANT NOS NONAB: CPT | Performed by: INTERNAL MEDICINE

## 2024-06-04 PROCEDURE — 86038 ANTINUCLEAR ANTIBODIES: CPT | Performed by: INTERNAL MEDICINE

## 2024-06-04 PROCEDURE — 82948 REAGENT STRIP/BLOOD GLUCOSE: CPT

## 2024-06-04 PROCEDURE — 80048 BASIC METABOLIC PNL TOTAL CA: CPT | Performed by: INTERNAL MEDICINE

## 2024-06-04 PROCEDURE — 86334 IMMUNOFIX E-PHORESIS SERUM: CPT | Performed by: PATHOLOGY

## 2024-06-04 PROCEDURE — 84166 PROTEIN E-PHORESIS/URINE/CSF: CPT | Performed by: PATHOLOGY

## 2024-06-04 PROCEDURE — 94760 N-INVAS EAR/PLS OXIMETRY 1: CPT

## 2024-06-04 PROCEDURE — 93306 TTE W/DOPPLER COMPLETE: CPT | Performed by: INTERNAL MEDICINE

## 2024-06-04 PROCEDURE — 99233 SBSQ HOSP IP/OBS HIGH 50: CPT | Performed by: INTERNAL MEDICINE

## 2024-06-04 PROCEDURE — 82607 VITAMIN B-12: CPT | Performed by: PHYSICIAN ASSISTANT

## 2024-06-04 RX ORDER — HYDROMORPHONE HCL/PF 1 MG/ML
0.5 SYRINGE (ML) INJECTION ONCE
Status: COMPLETED | OUTPATIENT
Start: 2024-06-04 | End: 2024-06-04

## 2024-06-04 RX ORDER — OXYCODONE HYDROCHLORIDE 5 MG/1
5 TABLET ORAL EVERY 6 HOURS PRN
Status: DISCONTINUED | OUTPATIENT
Start: 2024-06-04 | End: 2024-06-05

## 2024-06-04 RX ORDER — BUMETANIDE 0.25 MG/ML
2 INJECTION INTRAMUSCULAR; INTRAVENOUS 2 TIMES DAILY
Status: COMPLETED | OUTPATIENT
Start: 2024-06-04 | End: 2024-06-04

## 2024-06-04 RX ADMIN — ATORVASTATIN CALCIUM 80 MG: 80 TABLET, FILM COATED ORAL at 09:03

## 2024-06-04 RX ADMIN — CARVEDILOL 6.25 MG: 6.25 TABLET, FILM COATED ORAL at 09:03

## 2024-06-04 RX ADMIN — INSULIN GLARGINE 12 UNITS: 100 INJECTION, SOLUTION SUBCUTANEOUS at 21:27

## 2024-06-04 RX ADMIN — HEPARIN SODIUM 5000 UNITS: 5000 INJECTION, SOLUTION INTRAVENOUS; SUBCUTANEOUS at 14:21

## 2024-06-04 RX ADMIN — CARVEDILOL 6.25 MG: 6.25 TABLET, FILM COATED ORAL at 17:03

## 2024-06-04 RX ADMIN — HYDROMORPHONE HYDROCHLORIDE 0.5 MG: 1 INJECTION, SOLUTION INTRAMUSCULAR; INTRAVENOUS; SUBCUTANEOUS at 21:30

## 2024-06-04 RX ADMIN — BUMETANIDE 2 MG: 0.25 INJECTION INTRAMUSCULAR; INTRAVENOUS at 11:04

## 2024-06-04 RX ADMIN — ASPIRIN 81 MG CHEWABLE TABLET 81 MG: 81 TABLET CHEWABLE at 09:03

## 2024-06-04 RX ADMIN — OXYCODONE HYDROCHLORIDE 5 MG: 5 TABLET ORAL at 11:13

## 2024-06-04 RX ADMIN — OXYCODONE HYDROCHLORIDE 5 MG: 5 TABLET ORAL at 03:35

## 2024-06-04 RX ADMIN — BUMETANIDE 2 MG: 0.25 INJECTION INTRAMUSCULAR; INTRAVENOUS at 17:03

## 2024-06-04 RX ADMIN — HYDROMORPHONE HYDROCHLORIDE 0.5 MG: 1 INJECTION, SOLUTION INTRAMUSCULAR; INTRAVENOUS; SUBCUTANEOUS at 10:38

## 2024-06-04 RX ADMIN — OXYCODONE HYDROCHLORIDE 5 MG: 5 TABLET ORAL at 23:32

## 2024-06-04 RX ADMIN — PERFLUTREN 0.6 ML/MIN: 6.52 INJECTION, SUSPENSION INTRAVENOUS at 10:11

## 2024-06-04 RX ADMIN — AMLODIPINE BESYLATE 10 MG: 10 TABLET ORAL at 09:03

## 2024-06-04 RX ADMIN — OXYCODONE HYDROCHLORIDE 5 MG: 5 TABLET ORAL at 17:05

## 2024-06-04 NOTE — PROGRESS NOTES
[facility]  Progress Note  Name: Joaquin Frankel I  MRN: [id number]  Unit/Bed#: 4 [address] Date of Admission: 6/3/2024   Date of Service: 6/4/2024 I Hospital Day: 1    Assessment & Plan   * CLOVIS (acute kidney injury) (HCC)  Assessment & Plan  POA with creatinine of 6.40.    Creatinine today: 6.87   Unclear etiology of CLOVIS.  Likely multifactorial.  Including NSAID use with possible cardiorenal syndrome.    No evidence of hydronephrosis or obstruction on CT imaging.  Nephrology is following and completing a full workup.  SPEP/UPEP/Serum free light chain ration:  pending  C3 and C4: pending  JOSE ROBERTO: pending  dsDNA: pending  ANCA: pending  Anti-GBM: pending    Volume overload  Assessment & Plan  Patient with evidence of volume overload on admission as evidenced by increased edema of the left lower extremity which already has chronic lymphedema.  He also has noticed increased abdominal girth and swelling in his pannus.  ECHO:  pending  Place Robbins catheter for accurate I's and O's.  Both nephrology and cardiology are following for volume management.  Status post IV Lasix now changed to Bumex.  Patient on a renally restrictive diet.  Monitor daily weights.    Acute respiratory failure with hypoxia (HCC)  Assessment & Plan  Patient also with evidence of acute hypoxic respiratory failure.    Likely multifactorial including volume overload, suspected underlying obstructive sleep apnea and obesity hypoventilation syndrome.    No evidence of pulmonary emboli on diagnostic studies.  Patient states he has an upcoming appointment with a sleep specialist and ultimately will undergo a sleep study as an outpatient.  He has been told he has overnight hypoxia.  Will obtain overnight pulse ox to determine whether CPAP during his hospitalization would be of benefit.    Chronic acquired lymphedema  Assessment & Plan  Patient developed LLE lymphedema after left lower extremity surgical procedure several years  ago.  Patient complaining of increasing pain in the left lower extremity likely secondary to volume overload.  Left lower extremity venous Doppler: Pending  Patient would benefit from ambulatory referral to lymphedema clinic.    Anemia of chronic disease  Assessment & Plan  Patient has anemia of chronic disease likely secondary to underlying nephropathy.  However hemoglobin is lower today than baseline.  He did have some episodes of hematuria.  Robbins catheter is being inserted therefore will monitor for future episodes of hematuria.  Continue to monitor hemoglobin closely and transfuse for hemoglobin less than 7.0 or drastic drop in hemoglobin.  Obtain iron and B12 studies.    Morbid obesity (HCC)  Assessment & Plan  Patient counseled on the importance of exercise and following a healthy diet.    Diabetes ([facility])  Assessment & Plan  Lab Results   Component Value Date    HGBA1C 6.6 (H) 04/29/2024     Recent Labs     06/03/24  1124 06/03/24  1622 06/03/24 2013 06/04/24  0729   POCGLU 88 136 121 96       Blood Sugar Average: Last 72 hrs:  (P) 110.25    Amaryl held during his hospitalization.  Resume at discharge.    Continue Lantus 12 units at bedtime along with sliding scale insulin.    Continue diabetic diet.      Hypertension  Assessment & Plan  Blood pressures are stable and acceptable on amlodipine 10 mg daily and carvedilol 6.25 mg twice a day.    Patient has now been switched to IV Bumex.  Follow blood pressures closely while being diuresed.    Coronary artery disease  Assessment & Plan  Patient with a history of coronary artery disease with multivessel disease status post stents.    Continue with aspirin, high-dose statin and beta-blocker.  Cardiology following and appreciate their input.      VTE Pharmacologic Prophylaxis: VTE Score: 7 High Risk (Score >/= 5) - Pharmacological DVT Prophylaxis Ordered: heparin. Sequential Compression Devices Ordered.    Mobility:   Basic Mobility Inpatient Raw Score: 24  JH-M  Goal: 8: Walk 250 feet or more  JH-HLM Achieved: 7: Walk 25 feet or more  JH-HLM Goal NOT achieved. Continue with multidisciplinary rounding and encourage appropriate mobility to improve upon JH-HLM goals.    Patient Centered Rounds: I performed bedside rounds with nursing staff today.   Discussions with Specialists or Other Care Team Provider: Renal and cardiology    Education and Discussions with Family / Patient: Patient declined call to .     Total Time Spent on Date of Encounter in care of patient:  mins. This time was spent on one or more of the following: performing physical exam; counseling and coordination of care; obtaining or reviewing history; documenting in the medical record; reviewing/ordering tests, medications or procedures; communicating with other healthcare professionals and discussing with patient's family/caregivers.    Current Length of Stay: 1 day(s)  Current Patient Status: Inpatient   Certification Statement: The patient will continue to require additional inpatient hospital stay due to continued need for IV diuresis and workup for CLOVIS  Discharge Plan: Anticipate discharge in >72 hrs to home.    Code Status: Level 1 - Full Code    Subjective:   Patient reports significant pain in his left lower extremity likely secondary to increased volume.  He has noticed a slight reduction in volume accumulation in his lower abdomen.  He states while at rest he has no shortness of breath but does have some dyspnea on exertion.  He states he is urinating very little during the daytime but tends to urinate quite often overnight.  He is drinking at least a gallon of water a day but still gets cramping in his hands at times.    Objective:     Vitals:   Temp (24hrs), Av.6 °F (36.4 °C), Min:97.2 °F (36.2 °C), Max:97.9 °F (36.6 °C)    Temp:  [97.2 °F (36.2 °C)-97.9 °F (36.6 °C)] 97.9 °F (36.6 °C)  HR:  [57-79] 79  Resp:  [18-20] 18  BP: (120-144)/(59-69) 144/65  SpO2:  [87 %-95 %] 90 %  Body  mass index is 64.22 kg/m².     Input and Output Summary (last 24 hours):     Intake/Output Summary (Last 24 hours) at 6/4/2024 1101  Last data filed at 6/3/2024 1813  Gross per 24 hour   Intake 480 ml   Output 550 ml   Net -70 ml       Physical Exam:   Physical Exam  Vitals reviewed.   Constitutional:       Appearance: He is morbidly obese.   HENT:      Head: Normocephalic and atraumatic.      Mouth/Throat:      Mouth: Mucous membranes are moist.   Eyes:      General: No scleral icterus.  Cardiovascular:      Rate and Rhythm: Normal rate and regular rhythm.      Heart sounds: No murmur heard.  Pulmonary:      Breath sounds: Decreased breath sounds present. No wheezing or rales.   Chest:      Chest wall: No tenderness.   Abdominal:      General: Bowel sounds are normal. There is no distension.      Palpations: Abdomen is soft.      Tenderness: There is no abdominal tenderness.       Musculoskeletal:         General: Normal range of motion.   Lymphadenopathy:      Comments: Significant left lower extremity lymphedema   Skin:     General: Skin is warm and dry.      Findings: No rash.   Psychiatric:         Mood and Affect: Mood normal.         Cognition and Memory: Cognition normal.          Additional Data:     Labs:  Results from last 7 days   Lab Units 06/04/24  0836 06/03/24  0714   WBC Thousand/uL 8.60 9.91   HEMOGLOBIN g/dL 8.0* 9.0*   HEMATOCRIT % 25.0* 27.7*   PLATELETS Thousands/uL 319 327   SEGS PCT %  --  82*   LYMPHO PCT %  --  10*   MONO PCT %  --  5   EOS PCT %  --  2     Results from last 7 days   Lab Units 06/04/24  0821 06/03/24  0714   SODIUM mmol/L 134* 138   POTASSIUM mmol/L 4.2 3.8   CHLORIDE mmol/L 104 104   CO2 mmol/L 21 21   BUN mg/dL 64* 62*   CREATININE mg/dL 6.87* 6.40*   ANION GAP mmol/L 9 13   CALCIUM mg/dL 7.9* 8.1*   ALBUMIN g/dL  --  3.0*   TOTAL BILIRUBIN mg/dL  --  0.35   ALK PHOS U/L  --  72   ALT U/L  --  9   AST U/L  --  13   GLUCOSE RANDOM mg/dL 82 116     Results from last 7 days    Lab Units 24  0758   INR  1.10     Results from last 7 days   Lab Units 24  0729 24  2013 24  1622 24  1124   POC GLUCOSE mg/dl 96 121 136 88               Lines/Drains:  Invasive Devices       Peripheral Intravenous Line  Duration             Peripheral IV 24 Right Antecubital 1 day                      Telemetry:  Telemetry Orders (From admission, onward)               24 Hour Telemetry Monitoring  Continuous x 24 Hours (Telem)           Question:  Reason for 24 Hour Telemetry  Answer:  Decompensated CHF- and any one of the following: continuous diuretic infusion or total diuretic dose >200 mg daily, associated electrolyte derangement (I.e. K < 3.0), ionotropic drip (continuous infusion), hx of ventricular arrhythmia, or new EF < 35%                     Telemetry Reviewed: Normal Sinus Rhythm  Indication for Continued Telemetry Use: Acute CHF on >200 mg lasix/day or equivalent dose or with new reduced EF.          Imaging: Reviewed radiology reports from this admission including: abdominal/pelvic CT, ECHO, and V/Q scan    Recent Cultures (last 7 days):         Last 24 Hours Medication List:   Current Facility-Administered Medications   Medication Dose Route Frequency Provider Last Rate    acetaminophen  650 mg Oral Q6H PRN Les Batista MD      amLODIPine  10 mg Oral Daily Les Batista MD      aspirin  81 mg Oral Daily Les Batista MD      atorvastatin  80 mg Oral Daily Les Batista MD      bumetanide  2 mg Intravenous BID Bandar Timmons MD      carvedilol  6.25 mg Oral BID With Meals Les Batista MD      heparin (porcine)  5,000 Units Subcutaneous Q8H SHAZIA Les Batista MD      insulin glargine  12 Units Subcutaneous HS Les Batista MD      insulin lispro  1-6 Units Subcutaneous HS Les Batista MD      insulin lispro  2-12 Units Subcutaneous TID AC Les Batista MD      oxyCODONE   5 mg Oral Q6H PRN Natalia Ruiz PA-C          Today, Patient Was Seen By: Natalia Ruiz PA-C    **Please Note: This note may have been constructed using a voice recognition system.**

## 2024-06-04 NOTE — ASSESSMENT & PLAN NOTE
Patient with a history of coronary artery disease with multivessel disease status post stents.    Continue with aspirin, high-dose statin and beta-blocker.  Cardiology following and appreciate their input.

## 2024-06-04 NOTE — CASE MANAGEMENT
Case Management Assessment & Discharge Planning Note    Patient name Joaquin Frankel  Location [address] [address]/[address] [address] MRN [id number]  : 1980 Date 2024       Current Admission Date: 6/3/2024  Current Admission Diagnosis:CLOVIS (acute kidney injury) ([facility])   Patient Active Problem List    Diagnosis Date Noted Date Diagnosed    Volume overload 2024     Elevated d-dimer 2024     Surgical follow-up care 2024     Urinary problem in male 2024     Noncompliance with diet and medication regimen 2024     Epidermoid cyst of skin of scalp 2024     Stopped smoking with greater than 20 pack year history 2024     CKD (chronic kidney disease) stage 4, GFR 15-29 ml/min ([facility]) 2024     ANGY (obstructive sleep apnea) 2024     Acute respiratory failure with hypoxia ([facility]) 10/07/2023     Long term (current) use of immunomodulator 2023     Lymphedema 06/15/2022     History of coronary angioplasty with insertion of stent 2022     Anemia 2022     History of hidradenitis suppurativa 2022     Nocturnal hypoxia 2021     Chest pain 2021     Morbid obesity ([facility]) 03/10/2021     Hyponatremia 2021     CLOVIS (acute kidney injury) ([facility]) 2021     Diabetes ([facility]) 2018     Other hyperlipidemia 2017     Hypertension 2016     Coronary artery disease 2014       LOS (days): 1  Geometric Mean LOS (GMLOS) (days):   Days to GMLOS:     OBJECTIVE:    Risk of Unplanned Readmission Score: 26.41      Current admission status: Inpatient     Preferred Pharmacy:   [id number] - [place], [place] - [address]  [address] [address]  Phone: [phone] Fax: [phone]    [facility] - [place], [place] - [address] [facility] [address]  [address]  [facility] [address]  Phone: [phone] Fax: [phone]    Primary Care Provider: Hany Mcfarland,  DO    Primary Insurance: BLUE CROSS  Secondary Insurance:     ASSESSMENT:  Active Health Care Proxies    There are no active Health Care Proxies on file.  Readmission Root Cause  30 Day Readmission: No    Patient Information  Admitted from:: Home  Mental Status: Alert  During Assessment patient was accompanied by: Not accompanied during assessment  Assessment information provided by:: Patient  Primary Caregiver: Self  Support Systems: Self, Parent, Son  County of Residence: [place]  What city do you live in?: [place], [place]  Home entry access options. Select all that apply.: Stairs  Number of steps to enter home.:  (2 flights)  Type of Current Residence: Apartment  Floor Level: 2  Upon entering residence, is there a bedroom on the main floor (no further steps)?: No  A bedroom is located on the following floor levels of residence (select all that apply):: 3rd Floor  Upon entering residence, is there a bathroom on the main floor (no further steps)?: Yes  Living Arrangements: Lives w/ Son (aged 21)    Activities of Daily Living Prior to Admission  Functional Status: Independent  Completes ADLs independently?: Yes  Ambulates independently?: Yes  Does patient use assisted devices?: No  Does patient currently own DME?: No  Does patient have a history of Outpatient Therapy (PT/OT)?: No  Does the patient have a history of Short-Term Rehab?: No  Does patient have a history of HHC?: No  Does patient currently have HHC?: No     Patient Information Continued  Income Source: Employed ()  Does patient have prescription coverage?: Yes (Pt uses [employer] [facility] [place])  Does patient receive dialysis treatments?: No     Means of Transportation  Means of Transport to Appts:: Drives Self    Social Determinants of Health (SDOH)      Flowsheet Row Most Recent Value   Housing Stability    In the last 12 months, was there a time when you were not able to pay the mortgage or rent on time? N   In the past 12 months, how many times  have you moved where you were living? 0   At any time in the past 12 months, were you homeless or living in a shelter (including now)? N   Transportation Needs    In the past 12 months, has lack of transportation kept you from medical appointments or from getting medications? no   In the past 12 months, has lack of transportation kept you from meetings, work, or from getting things needed for daily living? No   Food Insecurity    Within the past 12 months, you worried that your food would run out before you got the money to buy more. Never true   Within the past 12 months, the food you bought just didn't last and you didn't have money to get more. Never true   Utilities    In the past 12 months has the electric, gas, oil, or water company threatened to shut off services in your home? No          DISCHARGE DETAILS:    Discharge planning discussed with:: Patient  Freedom of Choice: Yes  Comments - Freedom of Choice: SW met bedside with patient to introduce role, complete assessment, and discuss discharge planning needs. Patients states his plan is to return home w/ his son on discharge. His car is parked in the hospital lot and he will drive self home. Patient denies having any questions, concerns, or any anticipated discharge planning related needs that SW can assist with at this time.  CM contacted family/caregiver?: No- see comments (Patient declined call to contact.)  Were Treatment Team discharge recommendations reviewed with patient/caregiver?: Yes  Did patient/caregiver verbalize understanding of patient care needs?: Yes  Were patient/caregiver advised of the risks associated with not following Treatment Team discharge recommendations?: Yes    Contacts  Patient Contacts: Carl Frankel (father)  Relationship to Patient:: Family  Contact Method: Phone  Phone Number: [phone]  Reason/Outcome: Emergency Contact    Other Referral/Resources/Interventions Provided:  Interventions: None Indicated     Treatment  Team Recommendation: Home  Discharge Destination Plan:: Home  Transport at Discharge : Automobile, Self

## 2024-06-04 NOTE — PROGRESS NOTES
Cardiology Progress Note  [facility]     Joaquin Frankel 44 y.o. male MRN: [id number]  : 1980  Unit/Bed#: [place] [address] Encounter: [id number]        ASSESSMENT:     Admitted on this occasion for progressive RICHARDSON and exertional chest pain. Presenting EKG showed no acute diagnostic ischemic changes; hs troponin negative x3.   CAD. NSTEMI in 2020 s/p PCI to RCA then. Hx of previous PCI to LCX in .   Echo 2021: LVEF 65%.   Acute renal failure.  Possibly due to diabetic nephropathy, recent use of NSAIDs, home ACE inhibitor.  Elevated D dimer.  VQ scan showed low probability of PE.  Hypertensive urgency on arrival.   Morbid obesity with chronic lymphedema in the LLE.   Anemia of chronic disease.  Hx of L groin cellulitis and mass with skin grafting.  PMHx includes HLD, DM II, hx of tobacco use (reported stopping 3 years ago).     PLAN AND RECOMMENDATIONS:  Has clinical, radiographic and lab evidence of fluid overload.  Yesterday, he was challenged with IV Lasix 80 mg though urine output was reported to be minimal but the patient and his creatinine is 6.87 today.  Noted nephrology recommendations for trialing IV Bumex with Robbins.    From a cardiac standpoint he should continue amlodipine 10 mg daily and carvedilol 6.25 mg twice daily.  He had some sinus bradycardia on telemetry yesterday but rates are normal today.  Will keep an eye on tele and adjust his beta-blocker dose if needed.  His home lisinopril remains on hold due to CLOVIS.  His heparin drip was discontinued due to low probability for PE on VQ scan report.  Will plan for ischemic workup at some point once volume status is optimized and his renal function shows improvement.      Interval history: At rest, he is comfortable.  Denies any chest pain or shortness of breath while lying in bed.  Lower extremities remain edematous.        Meds/Allergies   current meds:   Current Facility-Administered Medications   Medication Dose  "Route Frequency    acetaminophen (TYLENOL) tablet 650 mg  650 mg Oral Q6H PRN    amLODIPine (NORVASC) tablet 10 mg  10 mg Oral Daily    aspirin chewable tablet 81 mg  81 mg Oral Daily    atorvastatin (LIPITOR) tablet 80 mg  80 mg Oral Daily    bumetanide (BUMEX) injection 2 mg  2 mg Intravenous BID    carvedilol (COREG) tablet 6.25 mg  6.25 mg Oral BID With Meals    heparin (porcine) subcutaneous injection 5,000 Units  5,000 Units Subcutaneous Q8H SHAZIA    insulin glargine (LANTUS) subcutaneous injection 12 Units 0.12 mL  12 Units Subcutaneous HS    insulin lispro (HumALOG/ADMELOG) 100 units/mL subcutaneous injection 1-6 Units  1-6 Units Subcutaneous HS    insulin lispro (HumALOG/ADMELOG) 100 units/mL subcutaneous injection 2-12 Units  2-12 Units Subcutaneous TID AC    oxyCODONE (ROXICODONE) IR tablet 5 mg  5 mg Oral Q6H PRN     Allergies   Allergen Reactions    Keflex [Cephalexin] Facial Swelling and Lip Swelling    Amoxicillin Hives     childhood       Objective   Vitals: Blood pressure 144/65, pulse 79, temperature 97.9 °F (36.6 °C), resp. rate 18, height 5' 7\" (1.702 m), weight (!) 186 kg (410 lb 0.9 oz), SpO2 90%.,       Intake/Output Summary (Last 24 hours) at 6/4/2024 1143  Last data filed at 6/3/2024 1813  Gross per 24 hour   Intake 480 ml   Output 550 ml   Net -70 ml       Physical Exam:  General: pleasant, comfortable, in no acute distress  Neurologic: speech is coherent, alert and oriented x3  Cardiovascular: regular S1 and S2, no murmurs  Pulmonary: Breath sounds diminished bilaterally though this could be due to body habitus  Abdomen: soft, nontender  Extremities: LLE lymphedema.  1-2+ pitting edema RLE.      Lab Results:   Troponins:   Results from last 7 days   Lab Units 06/03/24  1240   CK TOTAL U/L 267       Recent Results (from the past 24 hour(s))   B-Type Natriuretic Peptide(BNP)    Collection Time: 06/03/24 12:40 PM   Result Value Ref Range     (H) 0 - 100 pg/mL   CK    Collection Time: " "06/03/24 12:40 PM   Result Value Ref Range    Total  39 - 308 U/L   HS Troponin I 4hr    Collection Time: 06/03/24  4:06 PM   Result Value Ref Range    hs TnI 4hr 25 \"Refer to ACS Flowchart\"- see link ng/L    Delta 4hr hsTnI 2 <20 ng/L   Fingerstick Glucose (POCT)    Collection Time: 06/03/24  4:22 PM   Result Value Ref Range    POC Glucose 136 65 - 140 mg/dl   Urinalysis with microscopic    Collection Time: 06/03/24  5:33 PM   Result Value Ref Range    Color, UA Yellow     Clarity, UA Clear     Specific Gravity, UA 1.025 1.005 - 1.030    pH, UA 6.0 4.5, 5.0, 5.5, 6.0, 6.5, 7.0, 7.5, 8.0    Leukocytes, UA Negative Negative    Nitrite, UA Negative Negative    Protein,  (3+) (A) Negative mg/dl    Glucose, UA Negative Negative mg/dl    Ketones, UA Negative Negative mg/dl    Urobilinogen, UA <2.0 <2.0 mg/dl mg/dl    Bilirubin, UA Negative Negative    Occult Blood, UA Large (A) Negative    RBC, UA 10-20 (A) None Seen, 2-4 /hpf    WBC, UA 2-4 None Seen, 2-4, 5-60 /hpf    Epithelial Cells None Seen None Seen, Occasional /hpf    Bacteria, UA Moderate (A) None Seen, Occasional /hpf    Amorphous Crystals, UA Occasional    APTT    Collection Time: 06/03/24  5:45 PM   Result Value Ref Range    PTT 37 23 - 37 seconds   Fingerstick Glucose (POCT)    Collection Time: 06/03/24  8:13 PM   Result Value Ref Range    POC Glucose 121 65 - 140 mg/dl   Fingerstick Glucose (POCT)    Collection Time: 06/04/24  7:29 AM   Result Value Ref Range    POC Glucose 96 65 - 140 mg/dl   Basic metabolic panel    Collection Time: 06/04/24  8:21 AM   Result Value Ref Range    Sodium 134 (L) 135 - 147 mmol/L    Potassium 4.2 3.5 - 5.3 mmol/L    Chloride 104 96 - 108 mmol/L    CO2 21 21 - 32 mmol/L    ANION GAP 9 4 - 13 mmol/L    BUN 64 (H) 5 - 25 mg/dL    Creatinine 6.87 (H) 0.60 - 1.30 mg/dL    Glucose 82 65 - 140 mg/dL    Calcium 7.9 (L) 8.4 - 10.2 mg/dL    eGFR 8 ml/min/1.73sq m   CBC and Platelet    Collection Time: 06/04/24  8:36 AM "   Result Value Ref Range    WBC 8.60 4.31 - 10.16 Thousand/uL    RBC 2.82 (L) 3.88 - 5.62 Million/uL    Hemoglobin 8.0 (L) 12.0 - 17.0 g/dL    Hematocrit 25.0 (L) 36.5 - 49.3 %    MCV 89 82 - 98 fL    MCH 28.4 26.8 - 34.3 pg    MCHC 32.0 31.4 - 37.4 g/dL    RDW 14.7 11.6 - 15.1 %    Platelets 319 149 - 390 Thousands/uL    MPV 9.0 8.9 - 12.7 fL   Echo complete w/ contrast if indicated    Collection Time: 06/04/24 10:10 AM   Result Value Ref Range    Triscuspid Valve Regurgitation Peak Gradient 50.0 mmHg    RAA A4C 20.8 cm2    LA Volume Index (BP) 25.2 mL/m2    MV Peak A Flavio 0.87 m/s    MV stenosis pressure 1/2 time 67 ms    MV Peak E Flavio 130 cm/s    E wave deceleration time 230 ms    E/A ratio 1.49     MV valve area p 1/2 method 3.28     TR Peak Flavio 3.5 m/s    RVID d 3.9 cm    A4C EF 58 %    Tricuspid valve peak regurgitation velocity 3.52 m/s    Left ventricular stroke volume (2D) 77.00 mL    IVSd 1.40 cm    Tricuspid annular plane systolic excursion 2.60 cm    Ao root 3.00 cm    LVPWd 1.40 cm    LA size 4.8 cm    LA volume (BP) 69 mL    FS 33 28 - 44    LVIDS 3.50 cm    IVS 1.4 cm    LVIDd 5.20 cm    LA length (A2C) 4.80 cm    LEFT VENTRICLE SYSTOLIC VOLUME (MOD BIPLANE) 2D 52 mL    LV DIASTOLIC VOLUME (MOD BIPLANE) 2D 129 mL    Left Atrium Area-systolic Four Chamber 24.8 cm2    Left Atrium Area-systolic Apical Two Chamber 17.9 cm2    MV E' Tissue Velocity Lateral 11 cm/s    MV E' Tissue Velocity Septal 13 cm/s    LVSV, 2D 77 mL    BSA 2.74 m2    LV EF 58     Est. RA pres 8.0 mmHg    Right Ventricular Peak Systolic Pressure 58.00 mmHg   Fingerstick Glucose (POCT)    Collection Time: 06/04/24 11:34 AM   Result Value Ref Range    POC Glucose 82 65 - 140 mg/dl          Cardiac testing:   Relevant testing reviewed - see above  Telemetry: Rates normal today.  Sinus bradycardia yesterday with rates in the high 40s.     Jose De Jesus Cardozo PA-C  6/4/2024  11:49 AM     This note was completed in part utilizing direct voice  dictation software.  Grammatical errors, random word insertions, spelling mistakes, and incomplete sentences may be an occasional consequence of the system secondary to software limitations, ambient noise and hardware issues. Please read the chart carefully and recognize, using context, where substitutions have occurred.

## 2024-06-04 NOTE — UTILIZATION REVIEW
Initial Clinical Review    Admission: Date/Time/Statement:   Admission Orders (From admission, onward)       Ordered        06/03/24 0937  INPATIENT ADMISSION  Once                          Orders Placed This Encounter   Procedures    INPATIENT ADMISSION     Standing Status:   Standing     Number of Occurrences:   1     Order Specific Question:   Level of Care     Answer:   Med Surg [16]     Order Specific Question:   Estimated length of stay     Answer:   More than 2 Midnights     Order Specific Question:   Certification     Answer:   I certify that inpatient services are medically necessary for this patient for a duration of greater than two midnights. See H&P and MD Progress Notes for additional information about the patient's course of treatment.     ED Arrival Information       Expected   -    Arrival   6/3/2024 06:55    Acuity   Urgent              Means of arrival   Walk-In    Escorted by   Self    Service   Hospitalist    Admission type   Emergency              Arrival complaint   cp/sob             Chief Complaint   Patient presents with    Chest Pain    Shortness of Breath     Pt reports of cp with dyspnea on exertion for 2 weeks       Initial Presentation: 44 y.o. male pmh HTN, CAD w multivessel stent, CKD- (CR base 1.2-1.6), DM, morbid obesity presents self to ED w 2 wk worsening RICHARDSON, exertional CP. Reports worsening swelling/ redness of LE- L > R.     EXAM  Hypoxia in room air upper 80s, rales, bilat pitting edema w erythema /swelling LLE, HTN Urgency. LABS elevated D-Dimer, BNP, BUN /CLOVIS & GFR of 9, anemia, CXR reveals Mild pulmonary edema right worse than left. Given 80 mg IV Lasix, IV Dilaudid, IV Heparin GTT    Inpatient admission due to acute respiratory failure w hypoxia, Chest pain, Volume overload, CLOVIS on CKD, elevated D Dimer  Telemetry, Monitor POX, obtain bharat LE VQ scan;   IV Heparin GTT, consult Nephrology & obtain urine advanced studies. Monitor diuresis, consult Cardiology, cont home BP  agents, obtain occult stool sample. SSI  Anticipated Length of Stay/Certification Statement: Patient will be admitted on an inpatient basis with an anticipated length of stay of greater than 2 midnights secondary to above   Cardiology   Volume overload: acute renal failure which may be cardiorenal.    Elevated BNP/ CXR consistent w pulmonary vascular congestion; Cont IV Lasix & monitor response CT a/p pending;   VQ scan, DC IV Heparin if low probability. Consider repeat ischemic w/u when volume status sstable; was due for repeat stress testing after most recent OP Cardiac visit 5/2024. Obtain Echo  Nephrology   Baseline CR 1.2-1.6 POA 6.4. Cardiac BNP elevated at 233 but can be falsely low in setting of morbid obesity. Kidney imaging pending; Expand serological w/u w results of UA monitor response of 80 mg IV Lasix, hold home ACEi, avoid iodinated contrast trend BMP  Date: 6/4/2024   Day 2:   Nephrology  Urine output recorded as 550 cc. Denies dyspnea. Continues to have leg swelling.   CLOVIS: Creatinine incr to 6.87 today.    Not urinated much since admission and urine output is recorded as 500 cc. Proceed with Robbins catheter for accurate output assessment.    IV Bumex 2 mg x 2 today, monitor  diuresis.  Serological workup has been sent for evaluation of GN.     Provider  Acute respiratory failure has upcoming appt for sleep study; obtain overnight POX to determine if CPAP would benefit this hospitalization  CLOVIS  CR elevated Nephrology w full w/u all labs pending, Volume overload s/p IV Lasix now on IV Bumex; cont Robbins, I/O, renal diet, daily wt, ECHO & follow BP, pending. VAS pending; rec OP Lymphedema  clinic  Cardiology  IV Heparin GTT DC due to neg VQ Scan     He had some sinus bradycardia on telemetry yesterday but rates are normal today. Monitor  tele and adjust his beta-blocker dose if needed.  Continue amlodipine 10 mg daily and carvedilol 6.25 mg twice daily.  Home lisinopril remains on hold due to  CLOVIS.  Heparin drip was discontinued due to low probability for PE on VQ scan report.  Plan for ischemic workup at some point once volume status is optimized and his renal function shows improvement.  ED Triage Vitals   Temperature Pulse Respirations Blood Pressure SpO2   06/03/24 0715 06/03/24 0703 06/03/24 0703 06/03/24 0703 06/03/24 0703   98.1 °F (36.7 °C) 96 20 (!) 204/94 93 %      Temp Source Heart Rate Source Patient Position - Orthostatic VS BP Location FiO2 (%)   06/03/24 0715 06/03/24 0703 06/03/24 0703 06/03/24 0703 --   Oral Monitor Sitting Right arm       Pain Score       06/03/24 0850       9          Wt Readings from Last 1 Encounters:   06/04/24 (!) 186 kg (410 lb 0.9 oz)     Additional Vital Signs:   Date/Time Temp Pulse Resp BP MAP (mmHg) SpO2 Calculated FIO2 (%) - Nasal Cannula Nasal Cannula O2 Flow Rate (L/min) O2 Device Patient Position - Orthostatic VS   06/04/24 1038 -- 79 -- -- -- 90 % -- -- -- --   06/04/24 0931 -- 63 -- 144/65 -- 88 % Abnormal  -- -- -- --   06/04/24 09:03:38 97.9 °F (36.6 °C) 61 -- 144/65 91 92 % -- -- -- --   06/04/24 0900 97.9 °F (36.6 °C) -- -- -- -- 94 % -- -- -- --   06/04/24 03:13:58 -- 61 -- 121/69 86 91 % -- -- -- --   06/03/24 21:52:45 97.5 °F (36.4 °C) 71 18 135/68 90 93 % 32 3 L/min Nasal cannula --   06/03/24 2000 -- 66 -- -- -- 95 % -- -- -- --   06/03/24 18:49:43 97.2 °F (36.2 °C) Abnormal  57 18 120/59 79 92 % -- -- -- --   06/03/24 15:30:28 97.5 °F (36.4 °C) 63 18 130/64 86 87 % Abnormal  -- -- -- --   06/03/24 1234 -- -- -- -- -- 91 % 32 3 L/min Nasal cannula --   06/03/24 11:47:50 -- 67 20 123/67 86 88 % Abnormal  -- -- -- --   06/03/24 1032 -- 74 20 155/77 -- 94 % 32 3 L/min Nasal cannula Sitting   06/03/24 1000 -- 72 -- 137/65 94 95 % 32 3 L/min Nasal cannula --   06/03/24 0945 -- 70 -- 144/64 98 93 % 32 3 L/min -- --   06/03/24 0915 -- 70 -- 154/72 104 93 % 28 2 L/min Nasal cannula --   06/03/24 0845 -- 66 -- 161/72 104 94 % -- -- -- --   06/03/24 0815  "-- 76 20 144/71 103 95 % -- -- -- --   06/03/24 0800 -- 80 -- 172/82 Abnormal  118 95 % 28 2 L/min Nasal cannula --   06/03/24 0739 -- 83 -- 170/79 -- -- -- -- -- --   06/03/24 0715 98.1 °F (36.7 °C) 84 22 204/94 Abnormal  135 92 % -- -- None (Room air) Sitting   06/03/24 0703 -- 96 20 204/94 Abnormal  -- 93 % -- -- -- Sitting     Weights (last 14 days)    Date/Time Weight Weight Method Height   06/04/24 0931 186 kg (410 lb 0.9 oz) Abnormal  -- 5' 7\" (1.702 m)   06/04/24 0600 186 kg (409 lb 12.8 oz) Abnormal  Bed scale --   06/03/24 2032 186 kg (410 lb 3.2 oz) Abnormal  Bed scale --   06/03/24 15:30:28 -- -- 5' 7\" (1.702 m)     Pertinent Labs/Diagnostic Test Results:   6/4/2024 ECHO=   Left Ventricle: Left ventricular cavity size is normal. Wall thickness is moderately increased. There is moderate concentric hypertrophy. The left ventricular ejection fraction is 58% by visual estimation. Systolic function is normal. Wall motion is normal. Diastolic function is mildly abnormal, consistent with grade I (abnormal) relaxation.  Left atrial filling pressure is normal.    Left Atrium: The atrium is mildly dilated.    Right Atrium: The atrium is mildly dilated.    Mitral Valve: There is mild regurgitation.    Tricuspid Valve: There is mild regurgitation. The right ventricular systolic pressure is moderately elevated. The estimated right ventricular systolic pressure is 58.00 mmHg.    Pulmonic Valve: There is mild regurgitation.    Compared to previous exam, there is no significant change.  6/3 EKG  without ST or T wave abnormalities with occasional PVCs   CT chest abdomen pelvis wo contrast   Final Result by Margoth Arango MD (06/03 2322)      No acute pulmonary pathology.      No acute abdominal or pelvic pathology within limitation of a noncontrast study. In particular, no hydronephrosis.      Left inguinal and left external iliac lymphadenopathy, increased from May 2022. This may be reactive to a process in the left " "lower extremity or in the partially imaged pannus.      NM lung ventilation / perfusion   Final Result by Daniel Ackerman MD (06/03 1412)      The probability for pulmonary embolus is low.      XR chest 1 view portable   ED Interpretation by Kit Antoine MD (06/03 0753)   Mild pulmonary edema right worse than left, no additional acute cardiopulmonary abnormality      Final Result by Miah Toledo MD (06/03 0841)      No acute cardiopulmonary disease.    VAS VENOUS DUPLEX - LOWER LIMB BILATERAL    (Results Pending)         Results from last 7 days   Lab Units 06/04/24  0836 06/03/24  0714   WBC Thousand/uL 8.60 9.91   HEMOGLOBIN g/dL 8.0* 9.0*   HEMATOCRIT % 25.0* 27.7*   PLATELETS Thousands/uL 319 327   TOTAL NEUT ABS Thousands/µL  --  8.18*         Results from last 7 days   Lab Units 06/04/24  0821 06/03/24  0714   SODIUM mmol/L 134* 138   POTASSIUM mmol/L 4.2 3.8   CHLORIDE mmol/L 104 104   CO2 mmol/L 21 21   ANION GAP mmol/L 9 13   BUN mg/dL 64* 62*   CREATININE mg/dL 6.87* 6.40*   EGFR ml/min/1.73sq m 8 9   CALCIUM mg/dL 7.9* 8.1*     Results from last 7 days   Lab Units 06/03/24  0714   AST U/L 13   ALT U/L 9   ALK PHOS U/L 72   TOTAL PROTEIN g/dL 7.6   ALBUMIN g/dL 3.0*   TOTAL BILIRUBIN mg/dL 0.35     Results from last 7 days   Lab Units 06/04/24  1134 06/04/24  0729 06/03/24  2013 06/03/24  1622 06/03/24  1124   POC GLUCOSE mg/dl 82 96 121 136 88     Results from last 7 days   Lab Units 06/04/24  0821 06/03/24  0714   GLUCOSE RANDOM mg/dL 82 116             No results found for: \"BETA-HYDROXYBUTYRATE\"               Results from last 7 days   Lab Units 06/03/24  1240   CK TOTAL U/L 267     Results from last 7 days   Lab Units 06/03/24  1606 06/03/24  0854 06/03/24  0714   HS TNI 0HR ng/L  --   --  23   HS TNI 2HR ng/L  --  21  --    HSTNI D2 ng/L  --  -2  --    HS TNI 4HR ng/L 25  --   --    HSTNI D4 ng/L 2  --   --      Results from last 7 days   Lab Units 06/03/24  2459   D-DIMER QUANTITATIVE ug/ml " FEU 1.97*     Results from last 7 days   Lab Units 06/03/24  1745 06/03/24  0758   PROTIME seconds  --  14.5   INR   --  1.10   PTT seconds 37 34                         Results from last 7 days   Lab Units 06/03/24  1240   BNP pg/mL 233*                                     Results from last 7 days   Lab Units 06/03/24  1733   CLARITY UA  Clear   COLOR UA  Yellow   SPEC GRAV UA  1.025   PH UA  6.0   GLUCOSE UA mg/dl Negative   KETONES UA mg/dl Negative   BLOOD UA  Large*   PROTEIN UA mg/dl 300 (3+)*   NITRITE UA  Negative   BILIRUBIN UA  Negative   UROBILINOGEN UA (BE) mg/dl <2.0   LEUKOCYTES UA  Negative   WBC UA /hpf 2-4   RBC UA /hpf 10-20*   BACTERIA UA /hpf Moderate*   EPITHELIAL CELLS WET PREP /hpf None Seen             ED Treatment:   Medication Administration from 06/03/2024 0655 to 06/03/2024 1108         Date/Time Order Dose Route Action Action by Comments     06/03/2024 0716 EDT aspirin tablet 325 mg 325 mg Oral Given Triny Gautam RN --     06/03/2024 0800 EDT acetaminophen (Ofirmev) injection 1,000 mg 0 mg Intravenous Stopped Evangelina Mcfarland RN --     06/03/2024 0739 EDT acetaminophen (Ofirmev) injection 1,000 mg 1,000 mg Intravenous New Bag Evangelina Mcfarland, [place] --     06/03/2024 0850 EDT HYDROmorphone (DILAUDID) injection 1 mg 1 mg Intravenous Given Evangelina Mcfarland RN --     06/03/2024 1020 EDT heparin (porcine) 25,000 units in 0.45% NaCl 250 mL infusion (premix) 18 Units/kg/hr Intravenous New Bag Evangelina Mcfarland RN --     06/03/2024 1018 EDT HYDROmorphone (DILAUDID) injection 0.5 mg 0.5 mg Intravenous Given Evangelina Mcfarland RN --          Past Medical History:   Diagnosis Date    Acute hypoxic respiratory failure (HCC) 10/07/2023    Arthritis     Breathing difficulty     Cellulitis 10/07/2023    Coronary artery disease     Diabetes mellitus (HCC)     Diabetic neuropathy (HCC) 05/28/2021    Heart disease     CAD   s/p ptca with 1 stent 2012    Hidradenitis suppurativa     groin    History of  "transfusion     Hyperlipidemia     Hypertension     MI (myocardial infarction) (HCC)     \" silent \" M.I. in the past    Morbid obesity with BMI of 50.0-59.9, adult (HCC)     Nicotine dependence     Obesity     Pilonidal cyst     Type 1 non-ST elevation myocardial infarction (NSTEMI) ([facility]) 11/29/2020     Present on Admission:   CLOVIS (acute kidney injury) ([facility])   Chest pain   Chronic acquired lymphedema      Admitting Diagnosis: Chest pain [R07.9]  Renal failure [N19]  SOB (shortness of breath) [R06.02]  Dyspnea on exertion [R06.09]  Hypoxia [R09.02]  Age/Sex: 44 y.o. male  Admission Orders:  Robbins  I/O  Renal diet/ restrictive CHO diet  Daily WT  VAS LE    Scheduled Medications:  amLODIPine, 10 mg, Oral, Daily  aspirin, 81 mg, Oral, Daily  atorvastatin, 80 mg, Oral, Daily  bumetanide, 2 mg, Intravenous, BID  carvedilol, 6.25 mg, Oral, BID With Meals  heparin (porcine), 5,000 Units, Subcutaneous, Q8H SHAZIA  insulin glargine, 12 Units, Subcutaneous, HS  insulin lispro, 1-6 Units, Subcutaneous, HS  insulin lispro, 2-12 Units, Subcutaneous, TID AC    Continuous IV Infusions:     PRN Meds:  acetaminophen, 650 mg, Oral, Q6H PRN  oxyCODONE, 5 mg, Oral, Q6H PRN        IP CONSULT TO NEPHROLOGY  IP CONSULT TO CARDIOLOGY  IP CONSULT TO NEPHROLOGY  IP CONSULT TO VENOUS ACCESS TEAM    Network Utilization Review Department  ATTENTION: Please call with any questions or concerns to [phone] and carefully listen to the prompts so that you are directed to the right person. All voicemails are confidential.   For Discharge needs, contact Care Management DC Support Team at [phone] opt. [phone]  Send all requests for admission clinical reviews, approved or denied determinations and any other requests to dedicated fax number below belonging to the campus where the patient is receiving treatment. List of dedicated fax numbers for the Facilities:  FACILITY NAME UR FAX NUMBER   ADMISSION DENIALS (Administrative/Medical Necessity) [phone] "   DISCHARGE SUPPORT TEAM (NETWORK) [phone]   PARENT CHILD HEALTH (Maternity/NICU/Pediatrics) [phone]   [facility] [phone]   [facility] [phone]   [facility] [phone]   [facility] [phone]   [facility] [phone]   [facility] [phone]   [facility] [phone]   [facility] [phone]   [facility] [phone]   [facility] [phone]   [facility] [phone]   [facility] [phone]

## 2024-06-04 NOTE — TELEPHONE ENCOUNTER
----- Message from Gino Fulton MD sent at 6/4/2024 12:02 PM EDT -----  Please call and inform the patient that the Echocardiogram showed normal pumping function of the heart.    No significant valve abnormality was seen.  There was no significant change compared to the prior echocardiogram  We will discuss further at next scheduled office visit

## 2024-06-04 NOTE — ASSESSMENT & PLAN NOTE
Patient with evidence of volume overload on admission as evidenced by increased edema of the left lower extremity which already has chronic lymphedema.  He also has noticed increased abdominal girth and swelling in his pannus.  ECHO:  pending  Place Robbins catheter for accurate I's and O's.  Both nephrology and cardiology are following for volume management.  Status post IV Lasix now changed to Bumex.  Patient on a renally restrictive diet.  Monitor daily weights.

## 2024-06-04 NOTE — PLAN OF CARE
Problem: Prexisting or High Potential for Compromised Skin Integrity  Goal: Skin integrity is maintained or improved  Description: INTERVENTIONS:  - Identify patients at risk for skin breakdown  - Assess and monitor skin integrity  - Assess and monitor nutrition and hydration status  - Monitor labs   - Assess for incontinence   - Turn and reposition patient  - Assist with mobility/ambulation  - Relieve pressure over bony prominences  - Avoid friction and shearing  - Provide appropriate hygiene as needed including keeping skin clean and dry  - Evaluate need for skin moisturizer/barrier cream  - Collaborate with interdisciplinary team   - Patient/family teaching  - Consider wound care consult   Outcome: Progressing     Problem: PAIN - ADULT  Goal: Verbalizes/displays adequate comfort level or baseline comfort level  Description: Interventions:  - Encourage patient to monitor pain and request assistance  - Assess pain using appropriate pain scale  - Administer analgesics based on type and severity of pain and evaluate response  - Implement non-pharmacological measures as appropriate and evaluate response  - Consider cultural and social influences on pain and pain management  - Notify physician/advanced practitioner if interventions unsuccessful or patient reports new pain  Outcome: Progressing     Problem: INFECTION - ADULT  Goal: Absence or prevention of progression during hospitalization  Description: INTERVENTIONS:  - Assess and monitor for signs and symptoms of infection  - Monitor lab/diagnostic results  - Monitor all insertion sites, i.e. indwelling lines, tubes, and drains  - Monitor endotracheal if appropriate and nasal secretions for changes in amount and color  - [place] appropriate cooling/warming therapies per order  - Administer medications as ordered  - Instruct and encourage patient and family to use good hand hygiene technique  - Identify and instruct in appropriate isolation precautions for  identified infection/condition  Outcome: Progressing  Goal: Absence of fever/infection during neutropenic period  Description: INTERVENTIONS:  - Monitor WBC    Outcome: Progressing     Problem: SAFETY ADULT  Goal: Patient will remain free of falls  Description: INTERVENTIONS:  - Educate patient/family on patient safety including physical limitations  - Instruct patient to call for assistance with activity   - Consult OT/PT to assist with strengthening/mobility   - Keep Call bell within reach  - Keep bed low and locked with side rails adjusted as appropriate  - Keep care items and personal belongings within reach  - Initiate and maintain comfort rounds  - Make Fall Risk Sign visible to staff  - Offer Toileting every 2 Hours, in advance of need  - Initiate/Maintain bed alarm  - Obtain necessary fall risk management equipment: yellow socks  - Apply yellow socks and bracelet for high fall risk patients  - Consider moving patient to room near nurses station  Outcome: Progressing  Goal: Maintain or return to baseline ADL function  Description: INTERVENTIONS:  -  Assess patient's ability to carry out ADLs; assess patient's baseline for ADL function and identify physical deficits which impact ability to perform ADLs (bathing, care of mouth/teeth, toileting, grooming, dressing, etc.)  - Assess/evaluate cause of self-care deficits   - Assess range of motion  - Assess patient's mobility; develop plan if impaired  - Assess patient's need for assistive devices and provide as appropriate  - Encourage maximum independence but intervene and supervise when necessary  - Involve family in performance of ADLs  - Assess for home care needs following discharge   - Consider OT consult to assist with ADL evaluation and planning for discharge  - Provide patient education as appropriate  Outcome: Progressing  Goal: Maintains/Returns to pre admission functional level  Description: INTERVENTIONS:  - Perform AM-PAC 6 Click Basic Mobility/ Daily  Activity assessment daily.  - Set and communicate daily mobility goal to care team and patient/family/caregiver.   - Collaborate with rehabilitation services on mobility goals if consulted  - Perform Range of Motion 4 times a day.  - Reposition patient every 2 hours.  - Dangle patient 3 times a day  - Stand patient 3 times a day  - Ambulate patient 3 times a day  - Out of bed to chair 3 times a day   - Out of bed for meals 3 times a day  - Out of bed for toileting  - Record patient progress and toleration of activity level   Outcome: Progressing     Problem: CARDIOVASCULAR - ADULT  Goal: Maintains optimal cardiac output and hemodynamic stability  Description: INTERVENTIONS:  - Monitor I/O, vital signs and rhythm  - Monitor for S/S and trends of decreased cardiac output  - Administer and titrate ordered vasoactive medications to optimize hemodynamic stability  - Assess quality of pulses, skin color and temperature  - Assess for signs of decreased coronary artery perfusion  - Instruct patient to report change in severity of symptoms  Outcome: Progressing  Goal: Absence of cardiac dysrhythmias or at baseline rhythm  Description: INTERVENTIONS:  - Continuous cardiac monitoring, vital signs, obtain 12 lead EKG if ordered  - Administer antiarrhythmic and heart rate control medications as ordered  - Monitor electrolytes and administer replacement therapy as ordered  Outcome: Progressing     Problem: RESPIRATORY - ADULT  Goal: Achieves optimal ventilation and oxygenation  Description: INTERVENTIONS:  - Assess for changes in respiratory status  - Assess for changes in mentation and behavior  - Position to facilitate oxygenation and minimize respiratory effort  - Oxygen administered by appropriate delivery if ordered  - Initiate smoking cessation education as indicated  - Encourage broncho-pulmonary hygiene including cough, deep breathe, Incentive Spirometry  - Assess the need for suctioning and aspirate as needed  - Assess  and instruct to report SOB or any respiratory difficulty  - Respiratory Therapy support as indicated  Outcome: Progressing     Problem: SKIN/TISSUE INTEGRITY - ADULT  Goal: Skin Integrity remains intact(Skin Breakdown Prevention)  Description: Assess:  -Perform Juan assessment   -Clean and moisturize skin   -Inspect skin when repositioning, toileting, and assisting with ADLS  -Assess under medical devices   -Assess extremities for adequate circulation and sensation     Bed Management:  -Have minimal linens on bed & keep smooth, unwrinkled  -Change linens as needed when moist or perspiring  -Avoid sitting or lying in one position for more than 2 hours while in bed  -Keep HOB at 45 degrees     Toileting:  -Offer bedside commode  -Assess for incontinence   -Use incontinent care products after each incontinent episode    Activity:  -Mobilize patient 4 times a day  -Encourage activity and walks on unit  -Encourage or provide ROM exercises   -Turn and reposition patient every 2 Hours  -Use appropriate equipment to lift or move patient in bed  -Instruct/ Assist with weight shifting every 30 minutes when out of bed in chair  -Consider limitation of chair time 2 hour intervals    Skin Care:  -Avoid use of baby powder, tape, friction and shearing, hot water or constrictive clothing  -Relieve pressure over bony prominences   -Do not massage red bony areas    Next Steps:  -Teach patient strategies to minimize risks   -Consider consults to  interdisciplinary teams   Outcome: Progressing  Goal: Incision(s), wounds(s) or drain site(s) healing without S/S of infection  Description: INTERVENTIONS  - Assess and document dressing, incision, wound bed, drain sites and surrounding tissue  - Provide patient and family education  - Perform skin care/dressing changes   Outcome: Progressing

## 2024-06-04 NOTE — PROGRESS NOTES
NEPHROLOGY HOSPITAL PROGRESS NOTE   Joaquin Frankel 44 y.o. male MRN: [id number]  Unit/Bed#: [address] [place] [address] Encounter: [id number]  Reason for Consult: CLOVIS    ASSESSMENT and PLAN:  Joaquin Frankel is a 44 y.o. male who was admitted to [facility] after presenting with shortness of breath and chest pain. A renal consultation is requested today for assistance in the management of CLOVIS.     CLOVIS.  Etiology of CLOVIS ? cardiorenal syndrome on baseline of diabetic nephropathy +/- autoregulatory failure in setting of RAAS blockade +/- heavy NSAID use +/- rule out glomerulonephritis.  Baseline creatinine appears to be around 1.2-1.6.  Creatinine was rising as outpatient with most recent creatinine of 2.98 on 04/29/2024.  Creatinine on admission 6.40.  Creatinine pending today.  Urinalysis showing 10-20 RBC, 2-4 WBC.  No hydronephrosis on CT abdomen without contrast on admission.   Cardiac BNP elevated at 233 but can be falsely low in setting of morbid obesity.  Chest x-ray with signs of pulmonary vascular congestion.  CT chest with mosaic attenuation.  Follow SPEP/UPEP/serum free light chain ratio.  Check C3/C4/JOSE ROBERTO/dsDNA/ANCA/anti-GBM due to presence of hematuria.  Status post IV Lasix 80 mg x 1 on 06/03.  Keep holding lisinopril.  Avoid iodinated contrast.  Hold off further diuretics until labs are available from this morning.  No acute indication for renal replacement therapy.  Trend BMP.    Addendum: Creatinine has risen to 6.87 today.  Patient has not urinated much since admission and urine output is recorded as 500 cc.  We will proceed with placement of Robbins catheter for accurate output assessment.  We will provide IV Bumex 2 mg x 2 today to attempt diuresis.  Serological workup has been sent for evaluation of GN.  Discussed with internal medicine team and they are in agreement with this plan.     2.  Chronic kidney disease.  Most likely diabetic nephropathy.  Baseline creatinine appears to be around  1.2-1.6.  UCR 5200 mg/g 04/2024.     3.  Hypertension.  Home medications include amlodipine 10 mg daily, Coreg 6.25 mg twice daily, lisinopril 20 mg daily.  Blood pressure is currently improved with current dose of Coreg and amlodipine.  Keep holding lisinopril in setting of CLOVIS.     4.  Anemia in CKD.  Hemoglobin 9.0 on admission.  Check iron panel and ferritin for further evaluation.  Follow SPEP/UPEP/serum free light chain ratio for further evaluation of anemia in setting of CLOVIS.     5.  Chest pain.  VQ scan with low suspicion for PE.  IV heparin has been discontinued.  Echocardiogram has been ordered for further evaluation.  Cardiology is following.     SUBJECTIVE / 24H INTERVAL HISTORY:  Urine output recorded as 550 cc.  Denies dyspnea.  Continues to have leg swelling.    OBJECTIVE:  Current Weight: Weight - Scale: (!) 186 kg (409 lb 12.8 oz)  Vitals:    06/03/24 2032 06/03/24 2152 06/04/24 0313 06/04/24 0600   BP:  135/68 121/69    BP Location:       Pulse:  71 61    Resp:  18     Temp:  97.5 °F (36.4 °C)     TempSrc:  Oral     SpO2:  93% 91%    Weight: (!) 186 kg (410 lb 3.2 oz)   (!) 186 kg (409 lb 12.8 oz)   Height:           Intake/Output Summary (Last 24 hours) at 6/4/2024 0705  Last data filed at 6/3/2024 1813  Gross per 24 hour   Intake 580 ml   Output 550 ml   Net 30 ml     Review of Systems   Constitutional:  Negative for chills and fever.   HENT:  Negative for ear pain and sore throat.    Eyes:  Negative for pain and visual disturbance.   Respiratory:  Negative for cough and shortness of breath.    Cardiovascular:  Positive for leg swelling. Negative for chest pain and palpitations.   Gastrointestinal:  Negative for abdominal pain and vomiting.   Genitourinary:  Negative for dysuria and hematuria.   Musculoskeletal:  Negative for arthralgias and back pain.   Skin:  Negative for color change and rash.   Neurological:  Negative for seizures and syncope.   All other systems reviewed and are  negative.    Physical Exam  Vitals and nursing note reviewed.   Constitutional:       General: He is not in acute distress.     Appearance: He is well-developed.   HENT:      Head: Normocephalic and atraumatic.   Eyes:      Conjunctiva/sclera: Conjunctivae normal.   Cardiovascular:      Rate and Rhythm: Normal rate and regular rhythm.      Pulses: Normal pulses.      Heart sounds: Normal heart sounds. No murmur heard.  Pulmonary:      Effort: Pulmonary effort is normal. No respiratory distress.      Comments: Decreased breath sounds bilaterally at bases  Abdominal:      Palpations: Abdomen is soft.      Tenderness: There is no abdominal tenderness.   Musculoskeletal:         General: No swelling.      Cervical back: Neck supple.      Right lower leg: Edema present.      Left lower leg: Edema present.      Comments: Left lower extremity lymphedema present   Skin:     General: Skin is warm and dry.      Capillary Refill: Capillary refill takes less than 2 seconds.   Neurological:      Mental Status: He is alert.   Psychiatric:         Mood and Affect: Mood normal.       Medications:    Current Facility-Administered Medications:     acetaminophen (TYLENOL) tablet 650 mg, 650 mg, Oral, Q6H PRN, Les Batista MD    amLODIPine (NORVASC) tablet 10 mg, 10 mg, Oral, Daily, Les Batista MD    aspirin chewable tablet 81 mg, 81 mg, Oral, Daily, Les Batista MD    atorvastatin (LIPITOR) tablet 80 mg, 80 mg, Oral, Daily, Les Batisat MD, 80 mg at 06/03/24 1434    carvedilol (COREG) tablet 6.25 mg, 6.25 mg, Oral, BID With Meals, Les Batista MD, 6.25 mg at 06/03/24 1655    heparin (porcine) subcutaneous injection 5,000 Units, 5,000 Units, Subcutaneous, Q8H SHAZIA, Les Batista MD, 5,000 Units at 06/03/24 1815    insulin glargine (LANTUS) subcutaneous injection 12 Units 0.12 mL, 12 Units, Subcutaneous, HS, Les Batista MD, 12 Units at 06/03/24 2141    insulin lispro  "(HumALOG/ADMELOG) 100 units/mL subcutaneous injection 1-6 Units, 1-6 Units, Subcutaneous, HS, Les Batista MD    insulin lispro (HumALOG/ADMELOG) 100 units/mL subcutaneous injection 2-12 Units, 2-12 Units, Subcutaneous, TID AC **AND** Fingerstick Glucose (POCT), , , TID AC, Les Batista MD    oxyCODONE (ROXICODONE) IR tablet 5 mg, 5 mg, Oral, Q8H PRN, Les Batista MD, 5 mg at 06/04/24 0335    Laboratory Results:  Results from last 7 days   Lab Units 06/03/24  0714   WBC Thousand/uL 9.91   HEMOGLOBIN g/dL 9.0*   HEMATOCRIT % 27.7*   PLATELETS Thousands/uL 327   POTASSIUM mmol/L 3.8   CHLORIDE mmol/L 104   CO2 mmol/L 21   BUN mg/dL 62*   CREATININE mg/dL 6.40*   CALCIUM mg/dL 8.1*       Portions of the record may have been created with voice recognition software. Occasional wrong word or \"sound a like\" substitutions may have occurred due to the inherent limitations of voice recognition software. Read the chart carefully and recognize, using context, where substitutions have occurred. If you have any questions, please contact the dictating provider.    "

## 2024-06-04 NOTE — ASSESSMENT & PLAN NOTE
Patient has anemia of chronic disease likely secondary to underlying nephropathy.  However hemoglobin is lower today than baseline.  He did have some episodes of hematuria.  Robbins catheter is being inserted therefore will monitor for future episodes of hematuria.  Continue to monitor hemoglobin closely and transfuse for hemoglobin less than 7.0 or drastic drop in hemoglobin.  Obtain iron and B12 studies.

## 2024-06-04 NOTE — ASSESSMENT & PLAN NOTE
Lab Results   Component Value Date    HGBA1C 6.6 (H) 04/29/2024     Recent Labs     06/03/24  1124 06/03/24  1622 06/03/24 2013 06/04/24  0729   POCGLU 88 136 121 96       Blood Sugar Average: Last 72 hrs:  (P) 110.25    Amaryl held during his hospitalization.  Resume at discharge.    Continue Lantus 12 units at bedtime along with sliding scale insulin.    Continue diabetic diet.

## 2024-06-04 NOTE — TELEPHONE ENCOUNTER
Patient called back regarding message left on his phone for test results.  Please call him back @ [phone].

## 2024-06-04 NOTE — ASSESSMENT & PLAN NOTE
POA with creatinine of 6.40.    Creatinine today: 6.87   Unclear etiology of CLOVIS.  Likely multifactorial.  Including NSAID use with possible cardiorenal syndrome.    No evidence of hydronephrosis or obstruction on CT imaging.  Nephrology is following and completing a full workup.  SPEP/UPEP/Serum free light chain ration:  pending  C3 and C4: pending  JOSE ROBERTO: pending  dsDNA: pending  ANCA: pending  Anti-GBM: pending

## 2024-06-04 NOTE — ASSESSMENT & PLAN NOTE
Patient also with evidence of acute hypoxic respiratory failure.    Likely multifactorial including volume overload, suspected underlying obstructive sleep apnea and obesity hypoventilation syndrome.    No evidence of pulmonary emboli on diagnostic studies.  Patient states he has an upcoming appointment with a sleep specialist and ultimately will undergo a sleep study as an outpatient.  He has been told he has overnight hypoxia.  Will obtain overnight pulse ox to determine whether CPAP during his hospitalization would be of benefit.

## 2024-06-04 NOTE — ASSESSMENT & PLAN NOTE
Patient developed LLE lymphedema after left lower extremity surgical procedure several years ago.  Patient complaining of increasing pain in the left lower extremity likely secondary to volume overload.  Left lower extremity venous Doppler: Pending  Patient would benefit from ambulatory referral to lymphedema clinic.

## 2024-06-04 NOTE — ASSESSMENT & PLAN NOTE
Blood pressures are stable and acceptable on amlodipine 10 mg daily and carvedilol 6.25 mg twice a day.    Patient has now been switched to IV Bumex.  Follow blood pressures closely while being diuresed.

## 2024-06-05 PROBLEM — R79.89 ELEVATED D-DIMER: Status: RESOLVED | Noted: 2024-06-03 | Resolved: 2024-06-05

## 2024-06-05 PROBLEM — R07.9 CHEST PAIN: Status: RESOLVED | Noted: 2021-12-02 | Resolved: 2024-06-05

## 2024-06-05 PROBLEM — S31.109A WOUND OF ABDOMEN: Status: ACTIVE | Noted: 2024-06-05

## 2024-06-05 LAB
25(OH)D3 SERPL-MCNC: <7 NG/ML (ref 30–100)
ANION GAP SERPL CALCULATED.3IONS-SCNC: 10 MMOL/L (ref 4–13)
BASOPHILS # BLD AUTO: 0.04 THOUSANDS/ÂΜL (ref 0–0.1)
BASOPHILS NFR BLD AUTO: 0 % (ref 0–1)
BUN SERPL-MCNC: 71 MG/DL (ref 5–25)
CALCIUM SERPL-MCNC: 7.8 MG/DL (ref 8.4–10.2)
CHLORIDE SERPL-SCNC: 103 MMOL/L (ref 96–108)
CO2 SERPL-SCNC: 24 MMOL/L (ref 21–32)
CREAT SERPL-MCNC: 7.28 MG/DL (ref 0.6–1.3)
EOSINOPHIL # BLD AUTO: 0.23 THOUSAND/ÂΜL (ref 0–0.61)
EOSINOPHIL NFR BLD AUTO: 3 % (ref 0–6)
ERYTHROCYTE [DISTWIDTH] IN BLOOD BY AUTOMATED COUNT: 14.7 % (ref 11.6–15.1)
GFR SERPL CREATININE-BSD FRML MDRD: 8 ML/MIN/1.73SQ M
GLUCOSE SERPL-MCNC: 106 MG/DL (ref 65–140)
GLUCOSE SERPL-MCNC: 114 MG/DL (ref 65–140)
GLUCOSE SERPL-MCNC: 63 MG/DL (ref 65–140)
GLUCOSE SERPL-MCNC: 77 MG/DL (ref 65–140)
GLUCOSE SERPL-MCNC: 92 MG/DL (ref 65–140)
HCT VFR BLD AUTO: 27 % (ref 36.5–49.3)
HGB BLD-MCNC: 8.5 G/DL (ref 12–17)
IMM GRANULOCYTES # BLD AUTO: 0.04 THOUSAND/UL (ref 0–0.2)
IMM GRANULOCYTES NFR BLD AUTO: 0 % (ref 0–2)
KAPPA LC FREE SER-MCNC: 238.4 MG/L (ref 3.3–19.4)
KAPPA LC FREE/LAMBDA FREE SER: 1.37 {RATIO} (ref 0.26–1.65)
LAMBDA LC FREE SERPL-MCNC: 174.5 MG/L (ref 5.7–26.3)
LYMPHOCYTES # BLD AUTO: 0.83 THOUSANDS/ÂΜL (ref 0.6–4.47)
LYMPHOCYTES NFR BLD AUTO: 9 % (ref 14–44)
MCH RBC QN AUTO: 27.8 PG (ref 26.8–34.3)
MCHC RBC AUTO-ENTMCNC: 31.5 G/DL (ref 31.4–37.4)
MCV RBC AUTO: 88 FL (ref 82–98)
MONOCYTES # BLD AUTO: 0.74 THOUSAND/ÂΜL (ref 0.17–1.22)
MONOCYTES NFR BLD AUTO: 8 % (ref 4–12)
NEUTROPHILS # BLD AUTO: 7.27 THOUSANDS/ÂΜL (ref 1.85–7.62)
NEUTS SEG NFR BLD AUTO: 80 % (ref 43–75)
NRBC BLD AUTO-RTO: 0 /100 WBCS
PLATELET # BLD AUTO: 317 THOUSANDS/UL (ref 149–390)
PMV BLD AUTO: 9.1 FL (ref 8.9–12.7)
POTASSIUM SERPL-SCNC: 4.2 MMOL/L (ref 3.5–5.3)
PTH-INTACT SERPL-MCNC: 276.1 PG/ML (ref 12–88)
RBC # BLD AUTO: 3.06 MILLION/UL (ref 3.88–5.62)
SODIUM SERPL-SCNC: 137 MMOL/L (ref 135–147)
WBC # BLD AUTO: 9.15 THOUSAND/UL (ref 4.31–10.16)

## 2024-06-05 PROCEDURE — 82306 VITAMIN D 25 HYDROXY: CPT | Performed by: INTERNAL MEDICINE

## 2024-06-05 PROCEDURE — 94762 N-INVAS EAR/PLS OXIMTRY CONT: CPT

## 2024-06-05 PROCEDURE — NC001 PR NO CHARGE: Performed by: RADIOLOGY

## 2024-06-05 PROCEDURE — 85025 COMPLETE CBC W/AUTO DIFF WBC: CPT | Performed by: PHYSICIAN ASSISTANT

## 2024-06-05 PROCEDURE — 82948 REAGENT STRIP/BLOOD GLUCOSE: CPT

## 2024-06-05 PROCEDURE — 99232 SBSQ HOSP IP/OBS MODERATE 35: CPT | Performed by: PHYSICIAN ASSISTANT

## 2024-06-05 PROCEDURE — 99221 1ST HOSP IP/OBS SF/LOW 40: CPT | Performed by: PHYSICIAN ASSISTANT

## 2024-06-05 PROCEDURE — 99232 SBSQ HOSP IP/OBS MODERATE 35: CPT | Performed by: INTERNAL MEDICINE

## 2024-06-05 PROCEDURE — 83970 ASSAY OF PARATHORMONE: CPT | Performed by: INTERNAL MEDICINE

## 2024-06-05 PROCEDURE — 80048 BASIC METABOLIC PNL TOTAL CA: CPT | Performed by: PHYSICIAN ASSISTANT

## 2024-06-05 PROCEDURE — 99232 SBSQ HOSP IP/OBS MODERATE 35: CPT | Performed by: NURSE PRACTITIONER

## 2024-06-05 PROCEDURE — 99233 SBSQ HOSP IP/OBS HIGH 50: CPT | Performed by: INTERNAL MEDICINE

## 2024-06-05 RX ORDER — SODIUM HYPOCHLORITE 2.5 MG/ML
1 SOLUTION TOPICAL DAILY
Status: DISCONTINUED | OUTPATIENT
Start: 2024-06-06 | End: 2024-06-21

## 2024-06-05 RX ORDER — NYSTATIN 100000 [USP'U]/G
1 POWDER TOPICAL 2 TIMES DAILY
Status: DISCONTINUED | OUTPATIENT
Start: 2024-06-05 | End: 2024-06-26 | Stop reason: HOSPADM

## 2024-06-05 RX ORDER — BUMETANIDE 0.25 MG/ML
2 INJECTION INTRAMUSCULAR; INTRAVENOUS 2 TIMES DAILY
Status: COMPLETED | OUTPATIENT
Start: 2024-06-05 | End: 2024-06-05

## 2024-06-05 RX ORDER — OXYCODONE HYDROCHLORIDE 5 MG/1
5 TABLET ORAL
Status: DISCONTINUED | OUTPATIENT
Start: 2024-06-05 | End: 2024-06-08

## 2024-06-05 RX ORDER — INSULIN GLARGINE 100 [IU]/ML
10 INJECTION, SOLUTION SUBCUTANEOUS
Status: DISCONTINUED | OUTPATIENT
Start: 2024-06-05 | End: 2024-06-09

## 2024-06-05 RX ADMIN — OXYCODONE HYDROCHLORIDE 5 MG: 5 TABLET ORAL at 21:00

## 2024-06-05 RX ADMIN — ATORVASTATIN CALCIUM 80 MG: 80 TABLET, FILM COATED ORAL at 08:19

## 2024-06-05 RX ADMIN — NYSTATIN 1 APPLICATION: 100000 POWDER TOPICAL at 17:24

## 2024-06-05 RX ADMIN — CARVEDILOL 6.25 MG: 6.25 TABLET, FILM COATED ORAL at 08:19

## 2024-06-05 RX ADMIN — OXYCODONE HYDROCHLORIDE 5 MG: 5 TABLET ORAL at 05:37

## 2024-06-05 RX ADMIN — ASPIRIN 81 MG CHEWABLE TABLET 81 MG: 81 TABLET CHEWABLE at 08:19

## 2024-06-05 RX ADMIN — CYANOCOBALAMIN TAB 500 MCG 1000 MCG: 500 TAB at 10:22

## 2024-06-05 RX ADMIN — ACETAMINOPHEN 650 MG: 325 TABLET ORAL at 11:44

## 2024-06-05 RX ADMIN — OXYCODONE HYDROCHLORIDE 5 MG: 5 TABLET ORAL at 13:41

## 2024-06-05 RX ADMIN — Medication 2.5 MG: at 11:44

## 2024-06-05 RX ADMIN — CARVEDILOL 6.25 MG: 6.25 TABLET, FILM COATED ORAL at 15:42

## 2024-06-05 RX ADMIN — Medication 2.5 MG: at 19:49

## 2024-06-05 RX ADMIN — BUMETANIDE 2 MG: 0.25 INJECTION INTRAMUSCULAR; INTRAVENOUS at 17:23

## 2024-06-05 RX ADMIN — AMLODIPINE BESYLATE 10 MG: 10 TABLET ORAL at 08:19

## 2024-06-05 RX ADMIN — ACETAMINOPHEN 650 MG: 325 TABLET ORAL at 05:11

## 2024-06-05 RX ADMIN — OXYCODONE HYDROCHLORIDE 5 MG: 5 TABLET ORAL at 17:23

## 2024-06-05 RX ADMIN — Medication 2.5 MG: at 15:42

## 2024-06-05 RX ADMIN — BUMETANIDE 2 MG: 0.25 INJECTION INTRAMUSCULAR; INTRAVENOUS at 10:22

## 2024-06-05 RX ADMIN — INSULIN GLARGINE 10 UNITS: 100 INJECTION, SOLUTION SUBCUTANEOUS at 21:00

## 2024-06-05 NOTE — ASSESSMENT & PLAN NOTE
POA with creatinine of 6.40.    Creatinine today: 7.28  Unclear etiology of CLOVIS.  Likely multifactorial.  Including NSAID use with possible cardiorenal syndrome.    No evidence of hydronephrosis or obstruction on CT imaging.  Nephrology is following and completing a full workup.  Serum and urine immunofixation showing IgG kappa monoclonal band  Appreciate nephrology input  Patient's urine output is improving  Continue IV Bumex 2 mg twice daily  Plan for renal biopsy next Wednesday.    Hold aspirin until biopsy  Consult oncology  Monitor BMP

## 2024-06-05 NOTE — ASSESSMENT & PLAN NOTE
Patient with an elevated D-dimer.  This could be elevated secondary to progressively worsening renal disease however there is also concern for underlying pulmonary embolism.    Unable to perform CTA secondary to renal function   VQ scan showed low probability for PE

## 2024-06-05 NOTE — DISCHARGE INSTR - OTHER ORDERS
Plan:   Skin care plans:    1-Cleanse healed abdominal wounds with foaming cleanser and pat dry. Apply ABD pad and secure gentle paper tape. Change daily and as needed.  2-Cleanse wound to left medial thigh with normal saline & pat dry. Apply thin layer of Normlgel to wound. Cover with gauze or ABD and gentle paper tape. Change daily & as needed for soilage/dislodgement.  3-Cleanse fungal rash to right groin with foaming cleanser & pat dry. Apply light dusting of Nystatin powder 2x/day and gently remove excess to prevent caking.  4-Apply Prevent barrier cream to bilateral sacrum, buttock and heels 2x/day and as needed.  5-Float heels on 2 pillows in bed to offload pressure so heels are not in contact with mattress or pillows.  6-Use pressure redistribution cushion in chair when out of bed. Avoid prolonged sitting.  7-Moisturize skin daily with skin nourishing cream.  8-Turn/reposition every 2 hrs in bed for pressure re-distribution on skin.  9-Follow up at [facility] - call Central Scheduling for appointment: [phone]

## 2024-06-05 NOTE — CONSULTS
e-Consult (IPC)  - Interventional Radiology  Joaquin Frankel 44 y.o. male MRN: [id number]  Unit/Bed#: [address] [place] [address] Encounter: [id number]    Interventional Radiology has been consulted to evaluate Joaquin Frankel    We were consulted by Dr. Timmons concerning this patient with CLOVIS.    Inpatient Consult to IR  Consult performed by: Gustavo Neville MD  Consult ordered by: Bandar Timmons MD        06/05/24    Assessment/Recommendation:   Patient with CLOVIS and IR consulted to perform a renal biopsy for Branch Kit to determine the etiology of the CLOVIS and rule out MGRS (Monoclonal gammopathy of renal significance).  His serum and urine immunofixation shows IgG kapa monoclonal bands.  Since the patient was on ASA until this morning, we will have to wait 5-7 days before performing the procedure.  We will schedule the procedure for next Wednesday since we have no IR coverage on Tuesday.     21-30 minutes, >50% of the total time devoted to medical consultative verbal/EMR discussion between providers. Written report will be generated in the EMR.     Thank you for allowing Interventional Radiology to participate in the care of Joaquin Frankel. Please don't hesitate to call or TigerText us with any questions.     Gustavo Neville MD

## 2024-06-05 NOTE — PLAN OF CARE
Problem: Prexisting or High Potential for Compromised Skin Integrity  Goal: Skin integrity is maintained or improved  Description: INTERVENTIONS:  - Identify patients at risk for skin breakdown  - Assess and monitor skin integrity  - Assess and monitor nutrition and hydration status  - Monitor labs   - Assess for incontinence   - Turn and reposition patient  - Assist with mobility/ambulation  - Relieve pressure over bony prominences  - Avoid friction and shearing  - Provide appropriate hygiene as needed including keeping skin clean and dry  - Evaluate need for skin moisturizer/barrier cream  - Collaborate with interdisciplinary team   - Patient/family teaching  - Consider wound care consult   Outcome: Progressing     Problem: PAIN - ADULT  Goal: Verbalizes/displays adequate comfort level or baseline comfort level  Description: Interventions:  - Encourage patient to monitor pain and request assistance  - Assess pain using appropriate pain scale  - Administer analgesics based on type and severity of pain and evaluate response  - Implement non-pharmacological measures as appropriate and evaluate response  - Consider cultural and social influences on pain and pain management  - Notify physician/advanced practitioner if interventions unsuccessful or patient reports new pain  Outcome: Progressing     Problem: INFECTION - ADULT  Goal: Absence or prevention of progression during hospitalization  Description: INTERVENTIONS:  - Assess and monitor for signs and symptoms of infection  - Monitor lab/diagnostic results  - Monitor all insertion sites, i.e. indwelling lines, tubes, and drains  - [place] appropriate cooling/warming therapies per order  - Administer medications as ordered  - Instruct and encourage patient and family to use good hand hygiene technique  - Identify and instruct in appropriate isolation precautions for identified infection/condition  Outcome: Progressing  Goal: Absence of fever/infection during  neutropenic period  Description: INTERVENTIONS:  - Monitor WBC    Outcome: Progressing     Problem: SAFETY ADULT  Goal: Patient will remain free of falls  Description: INTERVENTIONS:  - Educate patient/family on patient safety including physical limitations  - Instruct patient to call for assistance with activity   - Consult OT/PT to assist with strengthening/mobility   - Keep Call bell within reach  - Keep bed low and locked with side rails adjusted as appropriate  - Keep care items and personal belongings within reach  - Initiate and maintain comfort rounds  - Make Fall Risk Sign visible to staff  - Apply yellow socks and bracelet for high fall risk patients  - Consider moving patient to room near nurses station  Outcome: Progressing  Goal: Maintain or return to baseline ADL function  Description: INTERVENTIONS:  -  Assess patient's ability to carry out ADLs; assess patient's baseline for ADL function and identify physical deficits which impact ability to perform ADLs (bathing, care of mouth/teeth, toileting, grooming, dressing, etc.)  - Assess/evaluate cause of self-care deficits   - Assess range of motion  - Assess patient's mobility; develop plan if impaired  - Assess patient's need for assistive devices and provide as appropriate  - Encourage maximum independence but intervene and supervise when necessary  - Involve family in performance of ADLs  - Assess for home care needs following discharge   - Consider OT consult to assist with ADL evaluation and planning for discharge  - Provide patient education as appropriate  Outcome: Progressing  Goal: Maintains/Returns to pre admission functional level  Description: INTERVENTIONS:  - Perform AM-PAC 6 Click Basic Mobility/ Daily Activity assessment daily.  - Set and communicate daily mobility goal to care team and patient/family/caregiver.   - Collaborate with rehabilitation services on mobility goals if consulted  - Out of bed for toileting  - Record patient  progress and toleration of activity level   Outcome: Progressing     Problem: CARDIOVASCULAR - ADULT  Goal: Maintains optimal cardiac output and hemodynamic stability  Description: INTERVENTIONS:  - Monitor I/O, vital signs and rhythm  - Monitor for S/S and trends of decreased cardiac output  - Administer and titrate ordered vasoactive medications to optimize hemodynamic stability  - Assess quality of pulses, skin color and temperature  - Assess for signs of decreased coronary artery perfusion  - Instruct patient to report change in severity of symptoms  Outcome: Progressing  Goal: Absence of cardiac dysrhythmias or at baseline rhythm  Description: INTERVENTIONS:  - Continuous cardiac monitoring, vital signs, obtain 12 lead EKG if ordered  - Administer antiarrhythmic and heart rate control medications as ordered  - Monitor electrolytes and administer replacement therapy as ordered  Outcome: Progressing     Problem: RESPIRATORY - ADULT  Goal: Achieves optimal ventilation and oxygenation  Description: INTERVENTIONS:  - Assess for changes in respiratory status  - Assess for changes in mentation and behavior  - Position to facilitate oxygenation and minimize respiratory effort  - Oxygen administered by appropriate delivery if ordered  - Initiate smoking cessation education as indicated  - Encourage broncho-pulmonary hygiene including cough, deep breathe, Incentive Spirometry  - Assess the need for suctioning and aspirate as needed  - Assess and instruct to report SOB or any respiratory difficulty  - Respiratory Therapy support as indicated  Outcome: Progressing     Problem: SKIN/TISSUE INTEGRITY - ADULT  Goal: Incision(s), wounds(s) or drain site(s) healing without S/S of infection  Description: INTERVENTIONS  - Assess and document dressing, incision, wound bed, drain sites and surrounding tissue  - Provide patient and family education  - Perform skin care/dressing changes  Outcome: Progressing

## 2024-06-05 NOTE — ASSESSMENT & PLAN NOTE
Lab Results   Component Value Date    HGBA1C 6.6 (H) 04/29/2024     Recent Labs     06/04/24 2011 06/05/24  0744 06/05/24  1131 06/05/24  1552   POCGLU 122 63* 92 106     Blood Sugar Average: Last 72 hrs:  (P) 102    Amaryl held during his hospitalization.  Resume at discharge.    Continue Lantus 10 units at bedtime along with sliding scale insulin.    Continue diabetic diet.

## 2024-06-05 NOTE — ASSESSMENT & PLAN NOTE
Find by wound care nurse that patient has a left lower quadrant abdominal wound with tracking and purulent drainage for which she recommended general surgery consultation  Appreciate surgery input  No acute surgical intervention at this time  Continue local wound care per wound care nurse  Follow-up in the outpatient wound center

## 2024-06-05 NOTE — WOUND OSTOMY CARE
"Progress Note - Wound   Joaquin Frankel 44 y.o. male MRN: [id number]  Unit/Bed#: [address] [place] [address] Encounter: [id number]      Assessment:   This is a 44 year old male patient admitted on 6/3/24 with acute kidney injury. He has a history of DM, HTN, CAD, hidradenitis suppurativa and morbid obesity. Patient was AAO x 3 and agreeable to have wound visit done. He had lewis to gravity in place with no record of BM. He refused to be turned for assessment of sacrobuttocks due to pain. Primary RN present and aware.    Assessment Findings:  1-Full thickness wound to left abdomen with yellow slough and green/yellow purulent drainage. Wound measures 0.8 x 1.4 x 1.6 cm. Orders in place for wound care and surgical consult requested due to depth of wound.  2-Full thickness surgical wound to left medial thigh with pink granulation tissue, yellow slough and scant serous drainage. Wound measures 2 x 1 x 0.2 cm. Orders in place for wound care.  3-Bilateral heels intact - patient refused inspection of sacrobuttocks due to pain - Provider notified and patient medicated for pain.    Plan:   Skin care plans:  1-Apply skin barrier to left abdominal periwound then apply Dakin's soaked gauze x 5 minutes. Remove, flush with NSS & pat dry. Pack wound with 1/4\" Iodoform packing (wound is 1.6 cm deep) and cover with ABD pad and gentle paper tape. Change daily & prn soilage/dislodgement.  2-Cleanse wound to left medial thigh with NSS & pat dry. Apply Melgisorb Ag (silver alginate) cut to size of wound. Cover with gauze or ABD and gentle paper tape. Change daily & prn soilage/dislodgement.  3-Cleanse fungal rash to right groin with foaming cleanser & pat dry. Apply light dusting of Nystatin powder 2x/day and gently remove excess to prevent caking.  4-Hydraguard to bilateral sacrum, buttock and heels BID and PRN  5-Float heels on 2 pillows to offload pressure so heels are not in contact with mattress or pillows.  6-Ehob pressure redistribution " cushion in chair when out of bed. Avoid prolonged sitting.  7-Moisturize skin daily with skin nourishing cream.  8-Turn/reposition q2h or when medically stable for pressure re-distribution on skin.     Wound 02/16/24 MASD Thigh Right;Medial (Active)   Wound Image   06/05/24 1038   Wound Description Intact;Other (Comment) 06/05/24 1038   Wound Length (cm) 6 cm 06/05/24 1038   Wound Width (cm) 8 cm 06/05/24 1038   Wound Depth (cm) 0 cm 06/05/24 1038   Wound Surface Area (cm^2) 48 cm^2 06/05/24 1038   Wound Volume (cm^3) 0 cm^3 06/05/24 1038   Calculated Wound Volume (cm^3) 0 cm^3 06/05/24 1038   Drainage Amount None 06/05/24 1038   Treatments Cleansed 06/05/24 1038   Dressing Nystatin powder 06/05/24 1038       Wound 06/03/24 Other (comment) Abdomen Left;Medial;Lower (Active)   Wound Image   06/04/24 1100   Wound Description Slough;Yellow 06/05/24 1100   Sarika-wound Assessment Erythema;Maceration 06/05/24 1100   Wound Length (cm) 0.8 cm 06/05/24 1100   Wound Width (cm) 1.4 cm 06/05/24 1100   Wound Depth (cm) 1.6 cm 06/05/24 1100   Wound Surface Area (cm^2) 1.12 cm^2 06/05/24 1100   Wound Volume (cm^3) 1.792 cm^3 06/05/24 1100   Calculated Wound Volume (cm^3) 1.79 cm^3 06/05/24 1100   Drainage Amount Moderate 06/05/24 1100   Drainage Description Yellow;Green;Purulent 06/05/24 1100   Non-staged Wound Description Full thickness 06/05/24 1100   Treatments Cleansed 06/05/24 1100   Dressing Iodoform Packing;ABD 06/05/24 1100   Wound packed? Yes 06/05/24 1100   Packing- # removed 0 06/05/24 1100   Packing- # inserted 1 06/05/24 1100   Dressing Changed New 06/05/24 1100   Dressing Status Clean;Dry;Intact 06/05/24 1100       Wound 06/05/24 Surgical Thigh Left;Medial (Active)   Wound Image   06/05/24 1036   Wound Description Granulation tissue;Pink;Slough;Yellow 06/05/24 1036   Sarika-wound Assessment Intact;Edema;Maceration 06/05/24 1036   Wound Length (cm) 2 cm 06/05/24 1036   Wound Width (cm) 1 cm 06/05/24 1036   Wound Depth  (cm) 0.2 cm 06/05/24 1036   Wound Surface Area (cm^2) 2 cm^2 06/05/24 1036   Wound Volume (cm^3) 0.4 cm^3 06/05/24 1036   Calculated Wound Volume (cm^3) 0.4 cm^3 06/05/24 1036   Drainage Amount Scant 06/05/24 1036   Drainage Description Serous 06/05/24 1036   Non-staged Wound Description Full thickness 06/05/24 1036   Treatments Cleansed 06/05/24 1036   Dressing Calcium Alginate with Silver;Gauze 06/05/24 1036   Dressing Changed New 06/05/24 1036   Dressing Status Clean;Dry;Intact 06/05/24 1036     Discussed assessment findings, and plan of care/recommendations with Margoth QUINN and Alia LAMBERT.    Wound care will follow along with patient throughout admission, please call or tiger text with questions and concerns.    Recommendations written as orders.  Myra Corea MSN, RN, CWON

## 2024-06-05 NOTE — ASSESSMENT & PLAN NOTE
Patient with evidence of volume overload on admission as evidenced by increased edema of the left lower extremity which already has chronic lymphedema.  He also has noticed increased abdominal girth and swelling in his pannus.  ECHO: LVEF 58%, diastolic function is mildly abnormal, consistent with grade 1 relaxation, bilateral atrial dilation, mitral valve with mild regurgitation, tricuspid valve with mild regurgitation, PA pressure 58 mmHg, there is mild pulmonic valve regurgitation  Continue lewis catheter for accurate I's and O's given body habitus  Both nephrology and cardiology are following for volume management.  Status post IV Lasix now changed to Bumex.  Patient on a renally restrictive diet.  Monitor daily weights.

## 2024-06-05 NOTE — PROGRESS NOTES
NEPHROLOGY HOSPITAL PROGRESS NOTE   Joaquin Frankel 44 y.o. male MRN: [id number]  Unit/Bed#: [address] [place] [address] Encounter: [id number]  Reason for Consult: CLOVIS    ASSESSMENT and PLAN:  Joaquin Frankel is a 44 y.o. male who was admitted to [facility] after presenting with shortness of breath and chest pain. A renal consultation is requested today for assistance in the management of CLOVIS.     CLOVIS.  Etiology of CLOVIS rule out MGRS +/-? cardiorenal syndrome on baseline of diabetic nephropathy +/- autoregulatory failure in setting of RAAS blockade +/- heavy NSAID use +/- rule out glomerulonephritis.  Baseline creatinine appears to be around 1.2-1.6.  Creatinine was rising as outpatient with most recent creatinine of 2.98 on 04/29/2024.  Creatinine on admission 6.40.  Creatinine today 7.28.  Urinalysis showing 10-20 RBC, 2-4 WBC.  No hydronephrosis on CT abdomen without contrast on admission.   Cardiac BNP elevated at 233 but can be falsely low in setting of morbid obesity.  Chest x-ray with signs of pulmonary vascular congestion.  CT chest with mosaic attenuation.  Serum and urine immunofixation showing IgG kappa monoclonal band.  Serum free light chain ratio pending.  No hypocomplementemia.  JOSE ROBERTO negative.  Follow dsDNA, ANCA, anti-GBM.  Status post IV Bumex 2 mg x 2 yesterday with increase in urine output.  Continue IV Bumex 2 mg twice daily.  Will obtain kidney biopsy for further evaluation of CLOVIS and to rule out MGRS.  No indication for renal replacement therapy at this stage but he may be headed in that direction.  Trend BMP.    2.  Chronic kidney disease.  Most likely diabetic nephropathy.  Baseline creatinine appears to be around 1.2-1.6.  UCR 5200 mg/g 04/2024.     3.  Hypertension.  Home medications include amlodipine 10 mg daily, Coreg 6.25 mg twice daily, lisinopril 20 mg daily.  Blood pressure is currently improved with current dose of Coreg and amlodipine.  Keep holding lisinopril in setting of  CLOVIS.     4.  Anemia in CKD.  Hemoglobin 8.5 today.  Iron panel/ferritin pending.  Serum immunofixation and urine immunofixation showing IgG kappa monoclonal band.     5.  Chest pain.  VQ scan with low suspicion for PE.  IV heparin has been discontinued.  Echocardiogram with normal EF and grade 1 diastolic dysfunction.  Cardiology is following and we will discuss with them regarding safety of holding aspirin for kidney biopsy.    Discussed with internal medicine team.  After discussion, we agreed to continue diuretics and to monitor kidney function closely and to obtain kidney biopsy for further evaluation of MGRS.     SUBJECTIVE / 24H INTERVAL HISTORY:  Urine output recorded as 2300 cc.  Still having dyspnea on exertion.  Continues to have leg swelling.  Robbins catheter is in place.    OBJECTIVE:  Current Weight: Weight - Scale: (!) 185 kg (406 lb 12.8 oz)  Vitals:    06/04/24 2224 06/05/24 0245 06/05/24 0551 06/05/24 0748   BP: 120/61 118/69  144/70   BP Location:  Left arm  Left arm   Pulse: 65 65  70   Resp: 18 16  17   Temp: 98.1 °F (36.7 °C) 97.7 °F (36.5 °C)  97.9 °F (36.6 °C)   TempSrc:  Oral  Oral   SpO2: 90% 93%  91%   Weight:   (!) 185 kg (406 lb 12.8 oz)    Height:           Intake/Output Summary (Last 24 hours) at 6/5/2024 0814  Last data filed at 6/5/2024 0547  Gross per 24 hour   Intake 670 ml   Output 2300 ml   Net -1630 ml     Review of Systems   Constitutional:  Negative for chills and fever.   HENT:  Negative for ear pain and sore throat.    Eyes:  Negative for pain and visual disturbance.   Respiratory:  Negative for cough and shortness of breath.    Cardiovascular:  Positive for leg swelling. Negative for chest pain and palpitations.   Gastrointestinal:  Negative for abdominal pain and vomiting.   Genitourinary:  Negative for dysuria and hematuria.   Musculoskeletal:  Negative for arthralgias and back pain.   Skin:  Negative for color change and rash.   Neurological:  Negative for seizures and  syncope.   All other systems reviewed and are negative.    Physical Exam  Vitals and nursing note reviewed.   Constitutional:       General: He is not in acute distress.     Appearance: He is well-developed.   HENT:      Head: Normocephalic and atraumatic.   Eyes:      Conjunctiva/sclera: Conjunctivae normal.   Cardiovascular:      Rate and Rhythm: Normal rate and regular rhythm.      Pulses: Normal pulses.      Heart sounds: Normal heart sounds. No murmur heard.  Pulmonary:      Effort: Pulmonary effort is normal. No respiratory distress.      Comments: Diminished breath sounds bilaterally at bases  Abdominal:      Palpations: Abdomen is soft.      Tenderness: There is no abdominal tenderness.   Musculoskeletal:         General: No swelling.      Cervical back: Neck supple.      Right lower leg: Edema present.      Left lower leg: Edema present.   Skin:     General: Skin is warm and dry.      Capillary Refill: Capillary refill takes less than 2 seconds.   Neurological:      Mental Status: He is alert. Mental status is at baseline.   Psychiatric:         Mood and Affect: Mood normal.       Medications:    Current Facility-Administered Medications:     acetaminophen (TYLENOL) tablet 650 mg, 650 mg, Oral, Q6H PRN, Les Batista MD, 650 mg at 06/05/24 0511    amLODIPine (NORVASC) tablet 10 mg, 10 mg, Oral, Daily, Les Batista MD, 10 mg at 06/04/24 0903    aspirin chewable tablet 81 mg, 81 mg, Oral, Daily, Les Batista MD, 81 mg at 06/04/24 0903    atorvastatin (LIPITOR) tablet 80 mg, 80 mg, Oral, Daily, Les Batista MD, 80 mg at 06/04/24 0903    carvedilol (COREG) tablet 6.25 mg, 6.25 mg, Oral, BID With Meals, Les Batista MD, 6.25 mg at 06/04/24 1703    heparin (porcine) subcutaneous injection 5,000 Units, 5,000 Units, Subcutaneous, Q8H SHAZIA, Les Batista MD, 5,000 Units at 06/04/24 1421    insulin glargine (LANTUS) subcutaneous injection 12 Units 0.12 mL, 12  "Units, Subcutaneous, HS, Les Batista MD, 12 Units at 06/04/24 2127    insulin lispro (HumALOG/ADMELOG) 100 units/mL subcutaneous injection 1-6 Units, 1-6 Units, Subcutaneous, HS, Les Batista MD    insulin lispro (HumALOG/ADMELOG) 100 units/mL subcutaneous injection 2-12 Units, 2-12 Units, Subcutaneous, TID AC **AND** Fingerstick Glucose (POCT), , , TID AC, Les Batista MD    oxyCODONE (ROXICODONE) IR tablet 5 mg, 5 mg, Oral, Q6H PRN, Natalia Ruiz PA-C, 5 mg at 06/05/24 0537    Laboratory Results:  Results from last 7 days   Lab Units 06/05/24  0619 06/04/24  0836 06/04/24  0821 06/03/24  0714   WBC Thousand/uL 9.15 8.60  --  9.91   HEMOGLOBIN g/dL 8.5* 8.0*  --  9.0*   HEMATOCRIT % 27.0* 25.0*  --  27.7*   PLATELETS Thousands/uL 317 319  --  327   POTASSIUM mmol/L 4.2  --  4.2 3.8   CHLORIDE mmol/L 103  --  104 104   CO2 mmol/L 24  --  21 21   BUN mg/dL 71*  --  64* 62*   CREATININE mg/dL 7.28*  --  6.87* 6.40*   CALCIUM mg/dL 7.8*  --  7.9* 8.1*       Portions of the record may have been created with voice recognition software. Occasional wrong word or \"sound a like\" substitutions may have occurred due to the inherent limitations of voice recognition software. Read the chart carefully and recognize, using context, where substitutions have occurred. If you have any questions, please contact the dictating provider.    "

## 2024-06-05 NOTE — PROGRESS NOTES
Cardiology Progress Note  [facility]     Joaquin Frankel 44 y.o. male MRN: [id number]  : 1980  Unit/Bed#: [address] [place] [address] Encounter: [id number]        ASSESSMENT:  Admitted on this occasion for progressive RICHARDSON and exertional chest pain. Presenting EKG showed no acute diagnostic ischemic changes; hs troponin negative x3.   Acute renal failure.  Possibly due to diabetic nephropathy, recent use of NSAIDs, home ACE inhibitor.  Cardiorenal syndrome with diastolic dysfunction.   Echo 24: LVEF 58% with grade I LVDD and normal wall motion. RV pressure is moderately elevated at 58 mmHg.   Elevated D dimer.  VQ scan showed low probability of PE.  Hypertensive urgency on arrival.   CAD. NSTEMI in 2020 s/p PCI to RCA then. Hx of previous PCI to LCX in .   Morbid obesity with chronic lymphedema in the LLE.   Anemia of chronic disease.  Hx of L groin cellulitis and mass with skin grafting.  PMHx includes HLD, DM II, hx of tobacco use (reported stopping 3 years ago).     PLAN AND RECOMMENDATIONS:  He has had -1600 cc of urine output on Bumex IV mg BID which will be continued. Creatinine slightly up from 6 to 7. A kidney biopsy is planned to rule out MGRS -- okay to hold ASA from cardiac standpoint for renal biopsy.   Continue Coreg 6.25 mg BID, amlodipine 10 mg daily, and atorvastatin 80 mg daily.   Indication for ischemic workup after optimization to be determined.             Interval history: Reporting having pain in his left leg today, worse than yesterday. Denies chest pain or SOB at rest.         Meds/Allergies   current meds:   Current Facility-Administered Medications   Medication Dose Route Frequency    acetaminophen (TYLENOL) tablet 650 mg  650 mg Oral Q6H PRN    amLODIPine (NORVASC) tablet 10 mg  10 mg Oral Daily    atorvastatin (LIPITOR) tablet 80 mg  80 mg Oral Daily    bumetanide (BUMEX) injection 2 mg  2 mg Intravenous BID    carvedilol (COREG) tablet 6.25 mg  6.25 mg Oral  "BID With Meals    cyanocobalamin (VITAMIN B-12) tablet 1,000 mcg  1,000 mcg Oral Daily    heparin (porcine) subcutaneous injection 5,000 Units  5,000 Units Subcutaneous Q8H SHAZIA    insulin glargine (LANTUS) subcutaneous injection 12 Units 0.12 mL  12 Units Subcutaneous HS    insulin lispro (HumALOG/ADMELOG) 100 units/mL subcutaneous injection 1-6 Units  1-6 Units Subcutaneous HS    insulin lispro (HumALOG/ADMELOG) 100 units/mL subcutaneous injection 2-12 Units  2-12 Units Subcutaneous TID AC    nystatin (MYCOSTATIN) powder 1 Application  1 Application Topical BID    oxyCODONE (ROXICODONE) IR tablet 5 mg  5 mg Oral Q2H PRN Max 8/day    oxyCODONE (ROXICODONE) split tablet 2.5 mg  2.5 mg Oral Q4H PRN     Allergies   Allergen Reactions    Keflex [Cephalexin] Facial Swelling and Lip Swelling    Amoxicillin Hives     childhood       Objective   Vitals: Blood pressure 144/70, pulse 70, temperature 97.9 °F (36.6 °C), temperature source Oral, resp. rate 17, height 5' 7\" (1.702 m), weight (!) 185 kg (406 lb 12.8 oz), SpO2 91%.,       Intake/Output Summary (Last 24 hours) at 6/5/2024 1442  Last data filed at 6/5/2024 0547  Gross per 24 hour   Intake 670 ml   Output 2300 ml   Net -1630 ml       Physical Exam:  General: pleasant, comfortable, in no acute distress  Neurologic: speech is coherent, alert and oriented x3  Cardiovascular: regular S1 and S2, no murmurs  Pulmonary: lungs clear to auscultation bilaterally, no rales or wheezes  Abdomen: soft, nontender  Extremities: LLE significant edema due to lymphedema. RLE with 1 to 2+ pitting edema       Lab Results:   Troponins:   Results from last 7 days   Lab Units 06/03/24  1240   CK TOTAL U/L 267       Recent Results (from the past 24 hour(s))   Fingerstick Glucose (POCT)    Collection Time: 06/04/24  4:02 PM   Result Value Ref Range    POC Glucose 114 65 - 140 mg/dl   Fingerstick Glucose (POCT)    Collection Time: 06/04/24  8:11 PM   Result Value Ref Range    POC Glucose 122 65 " - 140 mg/dl   Basic metabolic panel    Collection Time: 06/05/24  6:19 AM   Result Value Ref Range    Sodium 137 135 - 147 mmol/L    Potassium 4.2 3.5 - 5.3 mmol/L    Chloride 103 96 - 108 mmol/L    CO2 24 21 - 32 mmol/L    ANION GAP 10 4 - 13 mmol/L    BUN 71 (H) 5 - 25 mg/dL    Creatinine 7.28 (H) 0.60 - 1.30 mg/dL    Glucose 77 65 - 140 mg/dL    Calcium 7.8 (L) 8.4 - 10.2 mg/dL    eGFR 8 ml/min/1.73sq m   CBC and differential    Collection Time: 06/05/24  6:19 AM   Result Value Ref Range    WBC 9.15 4.31 - 10.16 Thousand/uL    RBC 3.06 (L) 3.88 - 5.62 Million/uL    Hemoglobin 8.5 (L) 12.0 - 17.0 g/dL    Hematocrit 27.0 (L) 36.5 - 49.3 %    MCV 88 82 - 98 fL    MCH 27.8 26.8 - 34.3 pg    MCHC 31.5 31.4 - 37.4 g/dL    RDW 14.7 11.6 - 15.1 %    MPV 9.1 8.9 - 12.7 fL    Platelets 317 149 - 390 Thousands/uL    nRBC 0 /100 WBCs    Segmented % 80 (H) 43 - 75 %    Immature Grans % 0 0 - 2 %    Lymphocytes % 9 (L) 14 - 44 %    Monocytes % 8 4 - 12 %    Eosinophils Relative 3 0 - 6 %    Basophils Relative 0 0 - 1 %    Absolute Neutrophils 7.27 1.85 - 7.62 Thousands/µL    Absolute Immature Grans 0.04 0.00 - 0.20 Thousand/uL    Absolute Lymphocytes 0.83 0.60 - 4.47 Thousands/µL    Absolute Monocytes 0.74 0.17 - 1.22 Thousand/µL    Eosinophils Absolute 0.23 0.00 - 0.61 Thousand/µL    Basophils Absolute 0.04 0.00 - 0.10 Thousands/µL   PTH, intact    Collection Time: 06/05/24  6:19 AM   Result Value Ref Range    .1 (H) 12.0 - 88.0 pg/mL   Vitamin D 25 hydroxy    Collection Time: 06/05/24  6:19 AM   Result Value Ref Range    Vit D, 25-Hydroxy <7.0 (L) 30.0 - 100.0 ng/mL   Fingerstick Glucose (POCT)    Collection Time: 06/05/24  7:44 AM   Result Value Ref Range    POC Glucose 63 (L) 65 - 140 mg/dl   Fingerstick Glucose (POCT)    Collection Time: 06/05/24 11:31 AM   Result Value Ref Range    POC Glucose 92 65 - 140 mg/dl          Cardiac testing:   Relevant testing reviewed - see above  Telemetry: sinus rhythm    Jose De Jesus  AUSTIN Cardozo  6/5/2024  2:51 PM     This note was completed in part utilizing direct voice dictation software.  Grammatical errors, random word insertions, spelling mistakes, and incomplete sentences may be an occasional consequence of the system secondary to software limitations, ambient noise and hardware issues. Please read the chart carefully and recognize, using context, where substitutions have occurred.

## 2024-06-05 NOTE — PROGRESS NOTES
[facility]  Progress Note  Name: Joaquin Frankel I  MRN: [id number]  Unit/Bed#: 4 [address] Date of Admission: 6/3/2024   Date of Service: 6/5/2024 I Hospital Day: 2    Assessment & Plan   * CLOVIS (acute kidney injury) (HCC)  Assessment & Plan  POA with creatinine of 6.40.    Creatinine today: 7.28  Unclear etiology of CLOVIS.  Likely multifactorial.  Including NSAID use with possible cardiorenal syndrome.    No evidence of hydronephrosis or obstruction on CT imaging.  Nephrology is following and completing a full workup.  Serum and urine immunofixation showing IgG kappa monoclonal band  Appreciate nephrology input  Patient's urine output is improving  Continue IV Bumex 2 mg twice daily  Plan for renal biopsy next Wednesday.    Hold aspirin until biopsy  Consult oncology  Monitor BMP    Volume overload  Assessment & Plan  Patient with evidence of volume overload on admission as evidenced by increased edema of the left lower extremity which already has chronic lymphedema.  He also has noticed increased abdominal girth and swelling in his pannus.  ECHO: LVEF 58%, diastolic function is mildly abnormal, consistent with grade 1 relaxation, bilateral atrial dilation, mitral valve with mild regurgitation, tricuspid valve with mild regurgitation, PA pressure 58 mmHg, there is mild pulmonic valve regurgitation  Continue lewis catheter for accurate I's and O's given body habitus  Both nephrology and cardiology are following for volume management.  Status post IV Lasix now changed to Bumex.  Patient on a renally restrictive diet.  Monitor daily weights.    Wound of abdomen  Assessment & Plan  Find by wound care nurse that patient has a left lower quadrant abdominal wound with tracking and purulent drainage for which she recommended general surgery consultation  Appreciate surgery input  No acute surgical intervention at this time  Continue local wound care per wound care nurse  Follow-up in the  outpatient wound center    Acute respiratory failure with hypoxia (HCC)  Assessment & Plan  Patient also with evidence of acute hypoxic respiratory failure.    Likely multifactorial including volume overload, suspected underlying obstructive sleep apnea and obesity hypoventilation syndrome.    No evidence of pulmonary emboli on diagnostic studies.  Patient states he has an upcoming appointment with a sleep specialist and ultimately will undergo a sleep study as an outpatient.  He has been told he has overnight hypoxia.  Will obtain overnight pulse ox to determine whether CPAP during his hospitalization would be of benefit.    Anemia of chronic disease  Assessment & Plan  Patient has anemia of chronic disease likely secondary to underlying nephropathy.  However hemoglobin is lower today than baseline.  He did have some episodes of hematuria.  Robbins catheter is being inserted therefore will monitor for future episodes of hematuria.  B12 310, add supplementation  Iron panel shows iron deficiency, however, will hold off on supplementation until seen by heme-onc  Continue to monitor hemoglobin closely and transfuse for hemoglobin less than 7.0 or drastic drop in hemoglobin.    Chronic acquired lymphedema  Assessment & Plan  Patient developed LLE lymphedema after left lower extremity surgical procedure several years ago.  Patient complaining of increasing pain in the left lower extremity likely secondary to volume overload.  Left lower extremity venous Doppler: Pending  Patient would benefit from ambulatory referral to lymphedema clinic.    Morbid obesity (HCC)  Assessment & Plan  Patient counseled on the importance of exercise and following a healthy diet.    Diabetes (HCC)  Assessment & Plan  Lab Results   Component Value Date    HGBA1C 6.6 (H) 04/29/2024     Recent Labs     06/04/24 2011 06/05/24  0744 06/05/24  1131 06/05/24  1552   POCGLU 122 63* 92 106     Blood Sugar Average: Last 72 hrs:  (P) 102    Amaryl held  during his hospitalization.  Resume at discharge.    Continue Lantus 10 units at bedtime along with sliding scale insulin.    Continue diabetic diet.      Hypertension  Assessment & Plan  Blood pressures are stable and acceptable on amlodipine 10 mg daily and carvedilol 6.25 mg twice a day.    Patient has now been switched to IV Bumex.  Follow blood pressures closely while being diuresed.    Coronary artery disease  Assessment & Plan  Patient with a history of coronary artery disease with multivessel disease status post stents.    Continue with aspirin, high-dose statin and beta-blocker.  Cardiology following and appreciate their input.    Elevated [id number] as of 6/5/2024  Assessment & Plan  Patient with an elevated D-dimer.  This could be elevated secondary to progressively worsening renal disease however there is also concern for underlying pulmonary embolism.    Unable to perform CTA secondary to renal function   VQ scan showed low probability for PE     Chest pain-resolved as of 6/5/2024  Assessment & Plan  Patient with evidence of chest pain which could be secondary to volume overload or even a pulmonary embolism.  Patient does have a history of coronary artery disease as well so will monitor on telemetry and follow-up with recommendations from cardiology.             VTE Pharmacologic Prophylaxis: VTE Score: 7 Moderate Risk (Score 3-4) - Pharmacological DVT Prophylaxis Ordered: heparin.    Mobility:   Basic Mobility Inpatient Raw Score: 24  JH-HLM Goal: 8: Walk 250 feet or more  JH-HLM Achieved: 3: Sit at edge of bed  JH-HLM Goal NOT achieved. Continue with multidisciplinary rounding and encourage appropriate mobility to improve upon JH-HLM goals.    Patient Centered Rounds: I performed bedside rounds with nursing staff today.   Discussions with Specialists or Other Care Team Provider: nursing, CM, nephrology, cardiology, wound care nurse, surgery AP    Education and Discussions with Family / Patient:  Patient declined call to .     Total Time Spent on Date of Encounter in care of patient:  mins. This time was spent on one or more of the following: performing physical exam; counseling and coordination of care; obtaining or reviewing history; documenting in the medical record; reviewing/ordering tests, medications or procedures; communicating with other healthcare professionals and discussing with patient's family/caregivers.    Current Length of Stay: 2 day(s)  Current Patient Status: Inpatient   Certification Statement: The patient will continue to require additional inpatient hospital stay due to renal failure, anemia, abnormal serum and urine immunofixation requiring renal biopsy next Wednesday, volume overload on IV diuretics  Discharge Plan: Anticipate discharge in >72 hrs to discharge location to be determined pending rehab evaluations.    Code Status: Level 1 - Full Code    Subjective:   Patient seen and examined at bedside.  He is resting in bed.  His biggest concern is left lower extremity pain.  He reports the left lower extremity has lymphedema and is chronically more swollen than the right.  He is tolerating Robbins catheter but does have discomfort.  He is amenable to deflating the balloon and repositioning the catheter.  He denies any headache, dizziness, chest pain, shortness of breath, nausea, vomiting, or diarrhea.  He is amenable to renal biopsy.    Objective:     Vitals:   Temp (24hrs), Av.9 °F (36.6 °C), Min:97.7 °F (36.5 °C), Max:98.1 °F (36.7 °C)    Temp:  [97.7 °F (36.5 °C)-98.1 °F (36.7 °C)] 97.9 °F (36.6 °C)  HR:  [61-73] 73  Resp:  [16-20] 17  BP: (112-144)/(49-70) 112/49  SpO2:  [90 %-93 %] 90 %  Body mass index is 63.71 kg/m².     Input and Output Summary (last 24 hours):     Intake/Output Summary (Last 24 hours) at 2024 1601  Last data filed at 2024 0547  Gross per 24 hour   Intake 670 ml   Output 2300 ml   Net -1630 ml       Physical Exam:   Physical  Exam  Vitals and nursing note reviewed.   Constitutional:       General: He is not in acute distress.     Appearance: He is obese. He is not toxic-appearing or diaphoretic.      Comments: Pleasant gentleman resting comfortably in bed on room air   HENT:      Head: Normocephalic.      Mouth/Throat:      Mouth: Mucous membranes are moist.   Eyes:      Conjunctiva/sclera: Conjunctivae normal.   Cardiovascular:      Rate and Rhythm: Normal rate.   Pulmonary:      Effort: Pulmonary effort is normal.      Breath sounds: Normal breath sounds.   Abdominal:      General: Bowel sounds are normal. There is no distension.      Palpations: Abdomen is soft.      Tenderness: There is no abdominal tenderness.   Genitourinary:     Comments: Indwelling Robbins catheter  Musculoskeletal:         General: Normal range of motion.      Cervical back: Normal range of motion.      Right lower leg: No edema.      Left lower leg: Edema present.   Skin:     General: Skin is warm and dry.      Capillary Refill: Capillary refill takes less than 2 seconds.      Findings: Erythema (LLE) present.   Neurological:      Mental Status: He is alert and oriented to person, place, and time. Mental status is at baseline.   Psychiatric:         Mood and Affect: Mood normal.         Speech: Speech normal.         Behavior: Behavior is cooperative.          Additional Data:     Labs:  Results from last 7 days   Lab Units 06/05/24  0619   WBC Thousand/uL 9.15   HEMOGLOBIN g/dL 8.5*   HEMATOCRIT % 27.0*   PLATELETS Thousands/uL 317   SEGS PCT % 80*   LYMPHO PCT % 9*   MONO PCT % 8   EOS PCT % 3     Results from last 7 days   Lab Units 06/05/24  0619 06/04/24  0821 06/03/24  0714   SODIUM mmol/L 137   < > 138   POTASSIUM mmol/L 4.2   < > 3.8   CHLORIDE mmol/L 103   < > 104   CO2 mmol/L 24   < > 21   BUN mg/dL 71*   < > 62*   CREATININE mg/dL 7.28*   < > 6.40*   ANION GAP mmol/L 10   < > 13   CALCIUM mg/dL 7.8*   < > 8.1*   ALBUMIN g/dL  --   --  3.0*   TOTAL  BILIRUBIN mg/dL  --   --  0.35   ALK PHOS U/L  --   --  72   ALT U/L  --   --  9   AST U/L  --   --  13   GLUCOSE RANDOM mg/dL 77   < > 116    < > = values in this interval not displayed.     Results from last 7 days   Lab Units 06/03/24  0758   INR  1.10     Results from last 7 days   Lab Units 06/05/24  1552 06/05/24  1131 06/05/24  0744 06/04/24 2011 06/04/24  1602 06/04/24  1134 06/04/24  0729 06/03/24 2013 06/03/24  1622 06/03/24  1124   POC GLUCOSE mg/dl 106 92 63* 122 114 82 96 121 136 88               Lines/Drains:  Invasive Devices       Peripheral Intravenous Line  Duration             Peripheral IV 06/03/24 Right Antecubital 2 days    Peripheral IV 06/04/24 Dorsal (posterior);Left Hand 1 day              Drain  Duration             Urethral Catheter Non-latex 16 Fr. 1 day                  Urinary Catheter:  Goal for removal: Remove after 48 hrs of I/O monitoring           Telemetry:  Telemetry Orders (From admission, onward)               24 Hour Telemetry Monitoring  Continuous x 24 Hours (Telem)        Question:  Reason for 24 Hour Telemetry  Answer:  Decompensated CHF- and any one of the following: continuous diuretic infusion or total diuretic dose >200 mg daily, associated electrolyte derangement (I.e. K < 3.0), ionotropic drip (continuous infusion), hx of ventricular arrhythmia, or new EF < 35%                     Telemetry Reviewed: Normal Sinus Rhythm  Indication for Continued Telemetry Use: Acute CHF on >200 mg lasix/day or equivalent dose or with new reduced EF.              Imaging: Reviewed radiology reports from this admission including: chest CT scan and abdominal/pelvic CT    Recent Cultures (last 7 days):         Last 24 Hours Medication List:   Current Facility-Administered Medications   Medication Dose Route Frequency Provider Last Rate    acetaminophen  650 mg Oral Q6H PRN Les Batista MD      amLODIPine  10 mg Oral Daily Les Batista MD      atorvastatin  80 mg  Oral Daily Les Batista MD      bumetanide  2 mg Intravenous BID Bandar Timmons MD      carvedilol  6.25 mg Oral BID With Meals Les Batista MD      cyanocobalamin  1,000 mcg Oral Daily PETRA Cleary      heparin (porcine)  5,000 Units Subcutaneous Q8H SHAZIA Les Batista MD      insulin glargine  10 Units Subcutaneous HS PETRA Cleary      insulin lispro  1-6 Units Subcutaneous HS Les Batista MD      insulin lispro  2-12 Units Subcutaneous TID AC Les Batista MD      nystatin  1 Application Topical BID PETRA Cleary      oxyCODONE  5 mg Oral Q2H PRN Max 8/day PETRA Cleary      oxyCODONE  2.5 mg Oral Q4H PRN PETRA Cleary          Today, Patient Was Seen By: PETRA Cleary    **Please Note: This note may have been constructed using a voice recognition system.**

## 2024-06-05 NOTE — ASSESSMENT & PLAN NOTE
Patient has anemia of chronic disease likely secondary to underlying nephropathy.  However hemoglobin is lower today than baseline.  He did have some episodes of hematuria.  Robbins catheter is being inserted therefore will monitor for future episodes of hematuria.  B12 310, add supplementation  Iron panel shows iron deficiency, however, will hold off on supplementation until seen by heme-onc  Continue to monitor hemoglobin closely and transfuse for hemoglobin less than 7.0 or drastic drop in hemoglobin.

## 2024-06-05 NOTE — CONSULTS
"Consultation - General Surgery   Joaquin Frankel 44 y.o. male MRN: [id number]  Unit/Bed#: [address] [place] [address] Encounter: [id number]    Assessment & Plan     Assessment:  45 y/o pmh CAD, HLD, HTN, morbid Obesity, chronic LLE lymphaedema presenting to the ED for increasing shortness of breath and chest pain. Surgery team consulted for abdominal wound.     Ct c/a/p: \"No ventral abdominal wall hernia. In large portion of the subcutaneous tissues of the anterior abdominal wall are outside of the field-of-view. There is subcutaneous tissue stranding and skin thickening of the pannus as   on the prior studies. There is a partially imaged tubular mildly hyperdense lesion along the left side of the undersurface of the pannus in the skin or immediately deep to the skin, which has been present dating back to at least 2021.\"    Plan:  No acute surgical intervention at this time.   Continue local wound care per wound care nurse.   Follow up outpatient wound center.     History of Present Illness     HPI:  Joaquin Frankel is a 44 y.o. male CAD, HLD, HTN, morbid Obesity, chronic LLE lymphaedema presenting to the ED for increasing shortness of breath and chest pain. Surgery team consulted for abdominal wound. Patient reports abdominal wound present x 7 days. Patient reports using warming compresses x3 days. Patient reports wound then opened up with drainage. Denies any nausea, vomiting, tenderness.     Inpatient consult to Acute Care Surgery  Consult performed by: El William PA-C  Consult ordered by: PETRA Cleary          Review of Systems   Constitutional:  Negative for chills, fatigue and fever.   HENT:  Negative for congestion, ear pain, hearing loss, postnasal drip, sinus pressure, sinus pain and sore throat.    Eyes:  Negative for pain and discharge.   Respiratory:  Positive for shortness of breath. Negative for chest tightness.    Cardiovascular:  Positive for chest pain.   Gastrointestinal:  Negative " "for abdominal pain, constipation, nausea and vomiting.   Genitourinary:  Negative for difficulty urinating.   Musculoskeletal:  Negative for arthralgias and myalgias.   Skin:  Positive for wound. Negative for rash.   Neurological:  Negative for dizziness and headaches.   Psychiatric/Behavioral:  Negative for behavioral problems.        Historical Information   Past Medical History:   Diagnosis Date    Acute hypoxic respiratory failure ([facility]) 10/07/2023    Arthritis     Breathing difficulty     Cellulitis 10/07/2023    Coronary artery disease     Diabetes mellitus ([facility])     Diabetic neuropathy ([facility]) 05/28/2021    Heart disease     CAD   s/p ptca with 1 stent 2012    Hidradenitis suppurativa     groin    History of transfusion     Hyperlipidemia     Hypertension     MI (myocardial infarction) ([facility])     \" silent \" M.I. in the past    Morbid obesity with BMI of 50.0-59.9, adult ([facility])     Nicotine dependence     Obesity     Pilonidal cyst     Type 1 non-ST elevation myocardial infarction (NSTEMI) ([facility]) 11/29/2020     Past Surgical History:   Procedure Laterality Date    CARDIAC SURGERY      CORONARY ANGIOPLASTY WITH STENT PLACEMENT      INCISION AND DRAINAGE OF WOUND N/A 1/24/2017    Procedure: INCISION AND DRAINAGE (I&D) BUTTOCK, PILONIDAL CYST;  Surgeon: Simba Adhikari MD;  Location: [facility];  Service:     LEG SURGERY Left     I&D of left leg 2012    WA DEBRIDEMENT SUBCUTANEOUS TISSUE 1ST 20 SQ CM/< Left 7/6/2021    Procedure: DEBRIDEMENT WOUND (WASH OUT);  Surgeon: Sudheer Mcfarlane MD;  Location:  MAIN OR;  Service: General    WA EXC B9 LESION MRGN XCP SK TG T/A/L >4.0 CM Left 4/6/2021    Procedure: EXCISION THIGH MASS;  Surgeon: Sudheer Mcfarlane MD;  Location: [facility];  Service: General    WA EXCISION H/P/P/U COMPLEX REPAIR Left 7/6/2021    Procedure: EXCISION PERINEAL ABSCESS LEFT THIGH;  Surgeon: Sudheer Mcfarlane MD;  [facility]: [facility];  Service: General    WA NEGATIVE PRESSURE WOUND THERAPY DME <= 50 SQ CM Left 4/6/2021    " "Procedure: APPLICATION VAC DRESSING THIGH;  Surgeon: Sudheer Mcfarlane MD;  [facility];  Service: General    WOUND DEBRIDEMENT Left 4/17/2021    Procedure: DEBRIDEMENT WOUND AND DRESSING CHANGE (WASH OUT);  Surgeon: Mahin Traore DO;  [facility]: [facility];  Service: General    WOUND DEBRIDEMENT Left 4/22/2021    Procedure: DEBRIDEMENT LOWER EXTREMITY (WASH OUT), left groin and thigh;  Surgeon: Sudheer Mcfarlane MD;  [facility];  Service: General    WOUND DEBRIDEMENT Left 5/26/2021    Procedure: DEBRIDEMENT LOWER EXTREMITY (WASH OUT);  Surgeon: Zak Castanon DO;  [facility];  Service: General    WOUND DEBRIDEMENT Left 5/28/2021    Procedure: Washout left thigh wound and closure;  Surgeon: Amor Jaramillo DO;  [facility];  Service: General     Social History   Social History     Substance and Sexual Activity   Alcohol Use Not Currently    Comment: rarely     Social History     Substance and Sexual Activity   Drug Use No     E-Cigarette/Vaping    E-Cigarette Use Never User      E-Cigarette/Vaping Substances    Nicotine No     THC No     CBD No     Flavoring No      Social History     Tobacco Use   Smoking Status Former    Current packs/day: 0.00    Average packs/day: 1.5 packs/day for 20.0 years (29.9 ttl pk-yrs)    Types: Cigarettes    Start date: 01/2021    Quit date: 03/2021    Years since quitting: 3.2    Passive exposure: Past   Smokeless Tobacco Never     Family History: non-contributory    Meds/Allergies   all current active meds have been reviewed  Allergies   Allergen Reactions    Keflex [Cephalexin] Facial Swelling and Lip Swelling    Amoxicillin Hives     childhood       Objective   First Vitals:   Blood Pressure: (!) 204/94 (06/03/24 0703)  Pulse: 96 (06/03/24 0703)  Temperature: 98.1 °F (36.7 °C) (06/03/24 0715)  Temp Source: Oral (06/03/24 0715)  Respirations: 20 (06/03/24 0703)  Height: 5' 7\" (170.2 cm) (06/03/24 1530)  Weight - Scale: (!) 186 kg (410 lb 3.2 oz) " "(06/03/24 2032)  SpO2: 93 % (06/03/24 0703)    Current Vitals:   Blood Pressure: 144/70 (06/05/24 0748)  Pulse: 70 (06/05/24 0748)  Temperature: 97.9 °F (36.6 °C) (06/05/24 0748)  Temp Source: Oral (06/05/24 0748)  Respirations: 17 (06/05/24 0748)  Height: 5' 7\" (170.2 cm) (06/04/24 0931)  Weight - Scale: (!) 185 kg (406 lb 12.8 oz) (06/05/24 0551)  SpO2: 91 % (06/05/24 0748)      Intake/Output Summary (Last 24 hours) at 6/5/2024 1153  Last data filed at 6/5/2024 0547  Gross per 24 hour   Intake 670 ml   Output 2300 ml   Net -1630 ml       Invasive Devices       Peripheral Intravenous Line  Duration             Peripheral IV 06/03/24 Right Antecubital 2 days    Peripheral IV 06/04/24 Dorsal (posterior);Left Hand <1 day              Drain  Duration             Urethral Catheter Non-latex 16 Fr. 1 day                    Physical Exam  Vitals and nursing note reviewed.   Constitutional:       Appearance: He is well-developed.   HENT:      Nose: Nose normal.   Eyes:      Extraocular Movements: Extraocular movements intact.   Cardiovascular:      Rate and Rhythm: Normal rate.      Heart sounds: S1 normal and S2 normal.   Pulmonary:      Effort: Pulmonary effort is normal. No respiratory distress.   Abdominal:      General: Bowel sounds are normal.      Palpations: Abdomen is soft.      Tenderness: There is no abdominal tenderness.      Comments: Obese abdomen. 0.8 cm x 1.4 cm x 1.6 cm   Musculoskeletal:      Cervical back: No edema.   Skin:     General: Skin is warm.      Capillary Refill: Capillary refill takes less than 2 seconds.      Findings: No rash.   Neurological:      Mental Status: He is alert and oriented to person, place, and time.   Psychiatric:         Speech: Speech normal.         Behavior: Behavior normal.         0.8 cm x 1.4 cm x 1.6 cm     Lab Results: I have personally reviewed pertinent lab results.  , CBC:   Lab Results   Component Value Date    WBC 9.15 06/05/2024    HGB 8.5 (L) 06/05/2024    HCT " 27.0 (L) 06/05/2024    MCV 88 06/05/2024     06/05/2024    RBC 3.06 (L) 06/05/2024    MCH 27.8 06/05/2024    MCHC 31.5 06/05/2024    RDW 14.7 06/05/2024    MPV 9.1 06/05/2024    NRBC 0 06/05/2024   , CMP:   Lab Results   Component Value Date    SODIUM 137 06/05/2024    K 4.2 06/05/2024     06/05/2024    CO2 24 06/05/2024    BUN 71 (H) 06/05/2024    CREATININE 7.28 (H) 06/05/2024    CALCIUM 7.8 (L) 06/05/2024    EGFR 8 06/05/2024     Imaging: I have personally reviewed pertinent reports.    EKG, Pathology, and Other Studies: I have personally reviewed pertinent reports.      Counseling / Coordination of Care  Total floor / unit time spent today 30 minutes.  Greater than 50% of total time was spent with the patient and / or family counseling and / or coordination of care.  A description of the counseling / coordination of care: obtaining history, performing physical exam, reviewing pertinent labs imaging, discussing case with attending.    .

## 2024-06-06 ENCOUNTER — APPOINTMENT (INPATIENT)
Dept: RADIOLOGY | Facility: HOSPITAL | Age: 44
DRG: 673 | End: 2024-06-06
Payer: COMMERCIAL

## 2024-06-06 ENCOUNTER — APPOINTMENT (OUTPATIENT)
Dept: NON INVASIVE DIAGNOSTICS | Facility: HOSPITAL | Age: 44
DRG: 673 | End: 2024-06-06
Attending: STUDENT IN AN ORGANIZED HEALTH CARE EDUCATION/TRAINING PROGRAM
Payer: COMMERCIAL

## 2024-06-06 LAB
ANION GAP SERPL CALCULATED.3IONS-SCNC: 12 MMOL/L (ref 4–13)
BUN SERPL-MCNC: 73 MG/DL (ref 5–25)
C-ANCA TITR SER IF: NORMAL TITER
CALCIUM SERPL-MCNC: 7.6 MG/DL (ref 8.4–10.2)
CHLORIDE SERPL-SCNC: 101 MMOL/L (ref 96–108)
CO2 SERPL-SCNC: 20 MMOL/L (ref 21–32)
CREAT SERPL-MCNC: 8.1 MG/DL (ref 0.6–1.3)
DSDNA AB SER QL CLIF: NEGATIVE
ERYTHROCYTE [DISTWIDTH] IN BLOOD BY AUTOMATED COUNT: 14.8 % (ref 11.6–15.1)
GBM AB SER IA-ACNC: <0.2 UNITS (ref 0–0.9)
GFR SERPL CREATININE-BSD FRML MDRD: 7 ML/MIN/1.73SQ M
GLUCOSE SERPL-MCNC: 104 MG/DL (ref 65–140)
GLUCOSE SERPL-MCNC: 151 MG/DL (ref 65–140)
GLUCOSE SERPL-MCNC: 74 MG/DL (ref 65–140)
GLUCOSE SERPL-MCNC: 83 MG/DL (ref 65–140)
GLUCOSE SERPL-MCNC: 94 MG/DL (ref 65–140)
HCT VFR BLD AUTO: 25 % (ref 36.5–49.3)
HGB BLD-MCNC: 8 G/DL (ref 12–17)
MCH RBC QN AUTO: 28.2 PG (ref 26.8–34.3)
MCHC RBC AUTO-ENTMCNC: 32 G/DL (ref 31.4–37.4)
MCV RBC AUTO: 88 FL (ref 82–98)
MYELOPEROXIDASE AB SER IA-ACNC: <0.2 UNITS (ref 0–0.9)
P-ANCA ATYPICAL TITR SER IF: NORMAL TITER
P-ANCA TITR SER IF: NORMAL TITER
PLATELET # BLD AUTO: 306 THOUSANDS/UL (ref 149–390)
PMV BLD AUTO: 9.1 FL (ref 8.9–12.7)
POTASSIUM SERPL-SCNC: 4.1 MMOL/L (ref 3.5–5.3)
PROTEINASE3 AB SER IA-ACNC: <0.2 UNITS (ref 0–0.9)
RBC # BLD AUTO: 2.84 MILLION/UL (ref 3.88–5.62)
SODIUM SERPL-SCNC: 133 MMOL/L (ref 135–147)
WBC # BLD AUTO: 9.66 THOUSAND/UL (ref 4.31–10.16)

## 2024-06-06 PROCEDURE — 02H633Z INSERTION OF INFUSION DEVICE INTO RIGHT ATRIUM, PERCUTANEOUS APPROACH: ICD-10-PCS | Performed by: STUDENT IN AN ORGANIZED HEALTH CARE EDUCATION/TRAINING PROGRAM

## 2024-06-06 PROCEDURE — 94760 N-INVAS EAR/PLS OXIMETRY 1: CPT

## 2024-06-06 PROCEDURE — 82948 REAGENT STRIP/BLOOD GLUCOSE: CPT

## 2024-06-06 PROCEDURE — C1752 CATH,HEMODIALYSIS,SHORT-TERM: HCPCS

## 2024-06-06 PROCEDURE — 99233 SBSQ HOSP IP/OBS HIGH 50: CPT | Performed by: INTERNAL MEDICINE

## 2024-06-06 PROCEDURE — 77001 FLUOROGUIDE FOR VEIN DEVICE: CPT

## 2024-06-06 PROCEDURE — 85027 COMPLETE CBC AUTOMATED: CPT | Performed by: INTERNAL MEDICINE

## 2024-06-06 PROCEDURE — 36556 INSERT NON-TUNNEL CV CATH: CPT

## 2024-06-06 PROCEDURE — 76937 US GUIDE VASCULAR ACCESS: CPT | Performed by: STUDENT IN AN ORGANIZED HEALTH CARE EDUCATION/TRAINING PROGRAM

## 2024-06-06 PROCEDURE — 80048 BASIC METABOLIC PNL TOTAL CA: CPT | Performed by: INTERNAL MEDICINE

## 2024-06-06 PROCEDURE — 36556 INSERT NON-TUNNEL CV CATH: CPT | Performed by: STUDENT IN AN ORGANIZED HEALTH CARE EDUCATION/TRAINING PROGRAM

## 2024-06-06 PROCEDURE — NC001 PR NO CHARGE: Performed by: STUDENT IN AN ORGANIZED HEALTH CARE EDUCATION/TRAINING PROGRAM

## 2024-06-06 PROCEDURE — 99232 SBSQ HOSP IP/OBS MODERATE 35: CPT | Performed by: INTERNAL MEDICINE

## 2024-06-06 PROCEDURE — 94660 CPAP INITIATION&MGMT: CPT

## 2024-06-06 PROCEDURE — 77001 FLUOROGUIDE FOR VEIN DEVICE: CPT | Performed by: STUDENT IN AN ORGANIZED HEALTH CARE EDUCATION/TRAINING PROGRAM

## 2024-06-06 PROCEDURE — C1894 INTRO/SHEATH, NON-LASER: HCPCS

## 2024-06-06 RX ORDER — HYDROMORPHONE HCL IN WATER/PF 6 MG/30 ML
0.2 PATIENT CONTROLLED ANALGESIA SYRINGE INTRAVENOUS ONCE
Status: DISCONTINUED | OUTPATIENT
Start: 2024-06-06 | End: 2024-06-07

## 2024-06-06 RX ORDER — BUMETANIDE 0.25 MG/ML
0.5 INJECTION INTRAMUSCULAR; INTRAVENOUS CONTINUOUS
Status: DISCONTINUED | OUTPATIENT
Start: 2024-06-06 | End: 2024-06-07

## 2024-06-06 RX ORDER — LIDOCAINE WITH 8.4% SOD BICARB 0.9%(10ML)
SYRINGE (ML) INJECTION AS NEEDED
Status: COMPLETED | OUTPATIENT
Start: 2024-06-06 | End: 2024-06-06

## 2024-06-06 RX ADMIN — AMLODIPINE BESYLATE 10 MG: 10 TABLET ORAL at 08:18

## 2024-06-06 RX ADMIN — CARVEDILOL 6.25 MG: 6.25 TABLET, FILM COATED ORAL at 17:11

## 2024-06-06 RX ADMIN — OXYCODONE HYDROCHLORIDE 5 MG: 5 TABLET ORAL at 20:25

## 2024-06-06 RX ADMIN — OXYCODONE HYDROCHLORIDE 5 MG: 5 TABLET ORAL at 03:08

## 2024-06-06 RX ADMIN — Medication 2.5 MG: at 17:10

## 2024-06-06 RX ADMIN — OXYCODONE HYDROCHLORIDE 5 MG: 5 TABLET ORAL at 15:13

## 2024-06-06 RX ADMIN — Medication 2.5 MG: at 11:46

## 2024-06-06 RX ADMIN — Medication 10 ML: at 14:18

## 2024-06-06 RX ADMIN — Medication 2.5 MG: at 06:50

## 2024-06-06 RX ADMIN — NYSTATIN 1 APPLICATION: 100000 POWDER TOPICAL at 08:45

## 2024-06-06 RX ADMIN — CARVEDILOL 6.25 MG: 6.25 TABLET, FILM COATED ORAL at 08:18

## 2024-06-06 RX ADMIN — Medication 2.5 MG: at 01:28

## 2024-06-06 RX ADMIN — Medication 5000 UNITS: at 08:18

## 2024-06-06 RX ADMIN — INSULIN LISPRO 1 UNITS: 100 INJECTION, SOLUTION INTRAVENOUS; SUBCUTANEOUS at 21:50

## 2024-06-06 RX ADMIN — IRON SUCROSE 200 MG: 20 INJECTION, SOLUTION INTRAVENOUS at 11:51

## 2024-06-06 RX ADMIN — DESMOPRESSIN ACETATE 20 MCG: 4 SOLUTION INTRAVENOUS at 17:10

## 2024-06-06 RX ADMIN — CYANOCOBALAMIN TAB 500 MCG 1000 MCG: 500 TAB at 08:18

## 2024-06-06 RX ADMIN — INSULIN GLARGINE 10 UNITS: 100 INJECTION, SOLUTION SUBCUTANEOUS at 21:50

## 2024-06-06 RX ADMIN — OXYCODONE HYDROCHLORIDE 5 MG: 5 TABLET ORAL at 08:19

## 2024-06-06 RX ADMIN — Medication 0.5 MG/HR: at 08:45

## 2024-06-06 RX ADMIN — ATORVASTATIN CALCIUM 80 MG: 80 TABLET, FILM COATED ORAL at 08:18

## 2024-06-06 RX ADMIN — Medication 2.5 MG: at 23:00

## 2024-06-06 NOTE — ASSESSMENT & PLAN NOTE
Patient also with evidence of acute hypoxic respiratory failure.    Likely multifactorial including volume overload, suspected underlying obstructive sleep apnea and obesity hypoventilation syndrome.    No evidence of pulmonary emboli on diagnostic studies.  Patient states he has an upcoming appointment with a sleep specialist and ultimately will undergo a sleep study as an outpatient.  He has been told he has overnight hypoxia. Overnight pulse ox study qualifies patient for CPAP. Will place order.

## 2024-06-06 NOTE — NURSING NOTE
Patient in need of blood work, however, patient at this time not in a happy mood, states someone told him that there would be no more blood work done today. Attending aware and will draw with am labs.

## 2024-06-06 NOTE — ASSESSMENT & PLAN NOTE
Patient has anemia of chronic disease likely secondary to underlying nephropathy.  However hemoglobin is lower today than baseline.  He did have some episodes of hematuria.  Robbins catheter is being inserted therefore will monitor for future episodes of hematuria.  B12 310, add supplementation  Iron panel shows iron deficiency. IV venofer 200mg every other day x 5 doses per Nephrology  Continue to monitor hemoglobin closely and transfuse for hemoglobin less than 7.0 or drastic drop in hemoglobin.

## 2024-06-06 NOTE — ASSESSMENT & PLAN NOTE
POA with creatinine of 6.40.    Creatinine today: 8.01  Unclear etiology of CLOVIS.  Likely multifactorial.    No evidence of hydronephrosis or obstruction on CT imaging.  Serum and urine immunofixation showing IgG kappa monoclonal band  Transition to IV Bumex drip 0.5mg/hr on 6/6/24  Plan for renal biopsy next Wednesday by IR. Hold aspirin until biopsy  Due to worsening creatinine and uremia, will place for HD tomorrow 6/7/24. IR consulted for HD catheter placement.  Nephrology following, input appreciated  Consult oncology  Monitor BMP

## 2024-06-06 NOTE — PROGRESS NOTES
Cardiology Progress Note  [facility]     Joaquin Frankel 44 y.o. male MRN: [id number]  : 1980  Unit/Bed#: [address] [place] [address] Encounter: [id number]        ASSESSMENT:  Admitted on this occasion for progressive RICHARDSON and exertional chest pain. Presenting EKG showed no acute diagnostic ischemic changes; hs troponin negative x3.   Acute renal failure.  Possibly due to diabetic nephropathy, recent use of NSAIDs, home ACE inhibitor, and cardiorenal syndrome with diastolic dysfunction.   Echo 24: LVEF 58% with grade I LVDD and normal wall motion. RV pressure is moderately elevated at 58 mmHg.   Elevated D dimer.  VQ scan showed low probability of PE.  CT chest and abdomen without contrast 2024 report: Slight mosaic attenuation of the lungs.  No other acute pulmonary pathology.  See full report in Epic.  Hypertensive urgency on arrival.   CAD. NSTEMI in 2020 s/p PCI to RCA then. Hx of previous PCI to LCX in .   Morbid obesity with chronic lymphedema in the LLE.   Anemia of chronic disease.  Hx of L groin cellulitis and mass with skin grafting.  PMHx includes HLD, DM II, hx of tobacco use (reported stopping 3 years ago).     PLAN AND RECOMMENDATIONS:  Discussed with nephrology.  There are plans to initiate hemodialysis given rising creatinine and inadequate response to diuretics thus far. IR has been consulted for temporary HD catheter placement.  He is being switched to a Bumex drip at 0.5 mg/h.  His aspirin is currently on hold for planned renal biopsy next week.  His blood pressures are acceptable.  Continue amlodipine 10 mg daily, carvedilol 6.25 mg twice daily.  Home lisinopril on hold due to above.         Interval history: At rest, he is chest pain-free and denies having dyspnea.  His symptoms are primarily exertional.  Denies having lightheadedness.  The patient is reporting increasing pain in his left lower extremity.        Meds/Allergies   current meds:   Current  "Facility-Administered Medications   Medication Dose Route Frequency    acetaminophen (TYLENOL) tablet 650 mg  650 mg Oral Q6H PRN    amLODIPine (NORVASC) tablet 10 mg  10 mg Oral Daily    atorvastatin (LIPITOR) tablet 80 mg  80 mg Oral Daily    bumetanide (BUMEX) 12.5 mg infusion 50 mL  0.5 mg/hr Intravenous Continuous    carvedilol (COREG) tablet 6.25 mg  6.25 mg Oral BID With Meals    Cholecalciferol (VITAMIN D3) tablet 5,000 Units  5,000 Units Oral Daily    cyanocobalamin (VITAMIN B-12) tablet 1,000 mcg  1,000 mcg Oral Daily    heparin (porcine) subcutaneous injection 5,000 Units  5,000 Units Subcutaneous Q8H SHAZIA    HYDROmorphone HCl (DILAUDID) injection 0.2 mg  0.2 mg Intravenous Once    insulin glargine (LANTUS) subcutaneous injection 10 Units 0.1 mL  10 Units Subcutaneous HS    insulin lispro (HumALOG/ADMELOG) 100 units/mL subcutaneous injection 1-6 Units  1-6 Units Subcutaneous HS    insulin lispro (HumALOG/ADMELOG) 100 units/mL subcutaneous injection 2-12 Units  2-12 Units Subcutaneous TID AC    iron sucrose (VENOFER) 200 mg in sodium chloride 0.9 % 100 mL IVPB  200 mg Intravenous Every Other Day    nystatin (MYCOSTATIN) powder 1 Application  1 Application Topical BID    oxyCODONE (ROXICODONE) IR tablet 5 mg  5 mg Oral Q2H PRN Max 8/day    oxyCODONE (ROXICODONE) split tablet 2.5 mg  2.5 mg Oral Q4H PRN    sodium hypochlorite (DAKIN'S HALF-STRENGTH) 0.25 percent topical solution 1 Application  1 Application Irrigation Daily     Allergies   Allergen Reactions    Keflex [Cephalexin] Facial Swelling and Lip Swelling    Amoxicillin Hives     childhood       Objective   Vitals: Blood pressure 133/68, pulse 68, temperature 98 °F (36.7 °C), temperature source Oral, resp. rate 20, height 5' 7\" (1.702 m), weight (!) 185 kg (408 lb 14.4 oz), SpO2 90%.,       Intake/Output Summary (Last 24 hours) at 6/6/2024 1337  Last data filed at 6/6/2024 0900  Gross per 24 hour   Intake 500 ml   Output 600 ml   Net -100 ml "       Physical Exam:  General: pleasant, comfortable, in no acute distress  Neurologic: speech is coherent, alert and oriented x3  Cardiovascular: regular S1 and S2, no murmurs  Pulmonary: lungs clear to auscultation bilaterally, no rales or wheezes  Abdomen: soft, nontender  Extremities: Massive edema LLE likely secondary to chronic lymphedema.  RLE shows 1-2+ pitting edema.      Lab Results:   Troponins:   Results from last 7 days   Lab Units 06/03/24  1240   CK TOTAL U/L 267       Recent Results (from the past 24 hour(s))   Fingerstick Glucose (POCT)    Collection Time: 06/05/24  3:52 PM   Result Value Ref Range    POC Glucose 106 65 - 140 mg/dl   Fingerstick Glucose (POCT)    Collection Time: 06/05/24  8:51 PM   Result Value Ref Range    POC Glucose 114 65 - 140 mg/dl   Basic metabolic panel    Collection Time: 06/06/24  3:12 AM   Result Value Ref Range    Sodium 133 (L) 135 - 147 mmol/L    Potassium 4.1 3.5 - 5.3 mmol/L    Chloride 101 96 - 108 mmol/L    CO2 20 (L) 21 - 32 mmol/L    ANION GAP 12 4 - 13 mmol/L    BUN 73 (H) 5 - 25 mg/dL    Creatinine 8.10 (H) 0.60 - 1.30 mg/dL    Glucose 74 65 - 140 mg/dL    Calcium 7.6 (L) 8.4 - 10.2 mg/dL    eGFR 7 ml/min/1.73sq m   CBC    Collection Time: 06/06/24  3:12 AM   Result Value Ref Range    WBC 9.66 4.31 - 10.16 Thousand/uL    RBC 2.84 (L) 3.88 - 5.62 Million/uL    Hemoglobin 8.0 (L) 12.0 - 17.0 g/dL    Hematocrit 25.0 (L) 36.5 - 49.3 %    MCV 88 82 - 98 fL    MCH 28.2 26.8 - 34.3 pg    MCHC 32.0 31.4 - 37.4 g/dL    RDW 14.8 11.6 - 15.1 %    Platelets 306 149 - 390 Thousands/uL    MPV 9.1 8.9 - 12.7 fL   Fingerstick Glucose (POCT)    Collection Time: 06/06/24  7:37 AM   Result Value Ref Range    POC Glucose 83 65 - 140 mg/dl   Fingerstick Glucose (POCT)    Collection Time: 06/06/24 11:13 AM   Result Value Ref Range    POC Glucose 104 65 - 140 mg/dl          Cardiac testing:   Relevant testing reviewed - see above      Jose De Jesus Cardozo PA-C  6/6/2024  1:43 PM      This note was completed in part utilizing direct voice dictation software.  Grammatical errors, random word insertions, spelling mistakes, and incomplete sentences may be an occasional consequence of the system secondary to software limitations, ambient noise and hardware issues. Please read the chart carefully and recognize, using context, where substitutions have occurred.

## 2024-06-06 NOTE — PROGRESS NOTES
Count includes the [facility]  Progress Note  Name: Joaquin Frankel I  MRN: [id number]  Unit/Bed#: [address] [place] [address] I Date of Admission: 6/3/2024   Date of Service: 6/6/2024 I Hospital Day: 3    Assessment & Plan   * CLOVIS (acute kidney injury) (HCC)  Assessment & Plan  POA with creatinine of 6.40.    Creatinine today: 8.01  Unclear etiology of CLOVIS.  Likely multifactorial.    No evidence of hydronephrosis or obstruction on CT imaging.  Serum and urine immunofixation showing IgG kappa monoclonal band  Transition to IV Bumex drip 0.5mg/hr on 6/6/24  Plan for renal biopsy next Wednesday by IR. Hold aspirin until biopsy  Due to worsening creatinine and uremia, will place for HD tomorrow 6/7/24. IR consulted for HD catheter placement.  Nephrology following, input appreciated  Consult oncology  Monitor BMP    Anemia of chronic disease  Assessment & Plan  Patient has anemia of chronic disease likely secondary to underlying nephropathy.  However hemoglobin is lower today than baseline.  He did have some episodes of hematuria.  Lewis catheter is being inserted therefore will monitor for future episodes of hematuria.  B12 310, add supplementation  Iron panel shows iron deficiency. IV venofer 200mg every other day x 5 doses per Nephrology  Continue to monitor hemoglobin closely and transfuse for hemoglobin less than 7.0 or drastic drop in hemoglobin.    Volume overload  Assessment & Plan  Patient with evidence of volume overload on admission as evidenced by increased edema of the left lower extremity which already has chronic lymphedema.  He also has noticed increased abdominal girth and swelling in his pannus.  ECHO: LVEF 58%, diastolic function is mildly abnormal, consistent with grade 1 relaxation, bilateral atrial dilation, mitral valve with mild regurgitation, tricuspid valve with mild regurgitation, PA pressure 58 mmHg, there is mild pulmonic valve regurgitation  Continue lewis catheter for accurate I's and O's given  body habitus  Both nephrology and cardiology are following for volume management  Continue IV Bumex infusion  Initiation of HD on 6/7/24  Patient on a renally restrictive diet.  Monitor daily weights.    Acute respiratory failure with hypoxia (HCC)  Assessment & Plan  Patient also with evidence of acute hypoxic respiratory failure.    Likely multifactorial including volume overload, suspected underlying obstructive sleep apnea and obesity hypoventilation syndrome.    No evidence of pulmonary emboli on diagnostic studies.  Patient states he has an upcoming appointment with a sleep specialist and ultimately will undergo a sleep study as an outpatient.  He has been told he has overnight hypoxia. Overnight pulse ox study qualifies patient for CPAP. Will place order.     Wound of abdomen  Assessment & Plan  Found by wound care nurse that patient has a left lower quadrant abdominal wound with tracking and purulent drainage for which she recommended general surgery consultation  Appreciate surgery input  No acute surgical intervention at this time  Continue local wound care per wound care nurse  Follow-up in the outpatient wound center    Chronic acquired lymphedema  Assessment & Plan  Patient developed LLE lymphedema after left lower extremity surgical procedure several years ago.  Patient complaining of increasing pain in the left lower extremity likely secondary to volume overload.  Left lower extremity venous Doppler: Pending  Patient would benefit from ambulatory referral to lymphedema clinic.    Chest pain  Assessment & Plan  POA  VQ scan low suspicion for PE  Echo with normal EF and grade 1 diastolic dysfunction  Cardiology input appreciated  ASA on hold for renal biopsy   Denies any further chest pain complaints today    Morbid obesity (HCC)  Assessment & Plan  Patient counseled on the importance of exercise and following a healthy diet.    Diabetes (HCC)  Assessment & Plan  Lab Results   Component Value Date     HGBA1C 6.6 (H) 04/29/2024     Recent Labs     06/05/24  1552 06/05/24  2051 06/06/24  0737 06/06/24  1113   POCGLU 106 114 83 104       Blood Sugar Average: Last 72 hrs:  (P) [id number]    Amaryl held during his hospitalization.  Resume at discharge.    Continue Lantus 10 units at bedtime along with sliding scale insulin.    Continue diabetic diet.      Hypertension  Assessment & Plan  Blood pressures are stable and acceptable on amlodipine 10 mg daily and carvedilol 6.25 mg twice a day.    Patient has now been switched to IV Bumex.  Follow blood pressures closely while being diuresed.    Coronary artery disease  Assessment & Plan  Patient with a history of coronary artery disease with multivessel disease status post stents.    Continue with aspirin, high-dose statin and beta-blocker.  Cardiology following and appreciate their input.             VTE Pharmacologic Prophylaxis: VTE Score: 7 High Risk (Score >/= 5) - Pharmacological DVT Prophylaxis Ordered: heparin. Sequential Compression Devices Ordered.    Mobility:   Basic Mobility Inpatient Raw Score: 23  JH-HLM Goal: 7: Walk 25 feet or more  JH-HLM Achieved: 5: Stand (1 or more minutes)  JH-HLM Goal achieved. Continue to encourage appropriate mobility.    Patient Centered Rounds: I performed bedside rounds with nursing staff today.   Discussions with Specialists or Other Care Team Provider: Nursing, Nephrology    Education and Discussions with Family / Patient: Patient declined call to .     Total Time Spent on Date of Encounter in care of patient: 45 mins. This time was spent on one or more of the following: performing physical exam; counseling and coordination of care; obtaining or reviewing history; documenting in the medical record; reviewing/ordering tests, medications or procedures; communicating with other healthcare professionals and discussing with patient's family/caregivers.    Current Length of Stay: 3 day(s)  Current Patient  Status: Inpatient   Certification Statement: The patient will continue to require additional inpatient hospital stay due to CLOVIS, volume overload  Discharge Plan: Anticipate discharge in >72 hrs to home.    Code Status: Level 1 - Full Code    Subjective:   Patient denies any new or worsening complaints today. Reports no further chest pain. He does say he is upset that he now needs dialysis which is causing him to be depressed, but denies any SI/HI. States he has a good friend group for support.     Objective:     Vitals:   Temp (24hrs), Av.1 °F (36.7 °C), Min:97.9 °F (36.6 °C), Max:98.2 °F (36.8 °C)    Temp:  [97.9 °F (36.6 °C)-98.2 °F (36.8 °C)] 98 °F (36.7 °C)  HR:  [61-73] 68  Resp:  [17-20] 20  BP: (112-133)/(49-68) 133/68  SpO2:  [90 %-96 %] 90 %  Body mass index is 64.04 kg/m².     Input and Output Summary (last 24 hours):     Intake/Output Summary (Last 24 hours) at 2024 1322  Last data filed at 2024 0900  Gross per 24 hour   Intake 500 ml   Output 600 ml   Net -100 ml       Physical Exam:   Physical Exam  Vitals and nursing note reviewed.   Constitutional:       General: He is not in acute distress.     Appearance: He is well-developed. He is obese. He is ill-appearing.   HENT:      Head: Normocephalic and atraumatic.   Eyes:      Conjunctiva/sclera: Conjunctivae normal.   Cardiovascular:      Rate and Rhythm: Normal rate and regular rhythm.      Heart sounds: No murmur heard.  Pulmonary:      Effort: Pulmonary effort is normal. No respiratory distress.      Breath sounds: Normal breath sounds.   Abdominal:      Palpations: Abdomen is soft.      Tenderness: There is no abdominal tenderness.   Musculoskeletal:         General: No swelling.      Cervical back: Neck supple.      Right lower leg: Edema present.      Left lower leg: Edema present.   Skin:     General: Skin is warm and dry.      Capillary Refill: Capillary refill takes less than 2 seconds.   Neurological:      Mental Status: He is alert  and oriented to person, place, and time.   Psychiatric:         Mood and Affect: Mood normal.          Additional Data:     Labs:  Results from last 7 days   Lab Units 06/06/24  0312 06/05/24  0619   WBC Thousand/uL 9.66 9.15   HEMOGLOBIN g/dL 8.0* 8.5*   HEMATOCRIT % 25.0* 27.0*   PLATELETS Thousands/uL 306 317   SEGS PCT %  --  80*   LYMPHO PCT %  --  9*   MONO PCT %  --  8   EOS PCT %  --  3     Results from last 7 days   Lab Units 06/06/24  0312 06/04/24  0821 06/03/24  0714   SODIUM mmol/L 133*   < > 138   POTASSIUM mmol/L 4.1   < > 3.8   CHLORIDE mmol/L 101   < > 104   CO2 mmol/L 20*   < > 21   BUN mg/dL 73*   < > 62*   CREATININE mg/dL 8.10*   < > 6.40*   ANION GAP mmol/L 12   < > 13   CALCIUM mg/dL 7.6*   < > 8.1*   ALBUMIN g/dL  --   --  3.0*   TOTAL BILIRUBIN mg/dL  --   --  0.35   ALK PHOS U/L  --   --  72   ALT U/L  --   --  9   AST U/L  --   --  13   GLUCOSE RANDOM mg/dL 74   < > 116    < > = values in this interval not displayed.     Results from last 7 days   Lab Units 06/03/24  0758   INR  1.10     Results from last 7 days   Lab Units 06/06/24  1113 06/06/24  0737 06/05/24  2051 06/05/24  1552 06/05/24  1131 06/05/24  0744 06/04/24 2011 06/04/24  1602 06/04/24  1134 06/04/24  0729 06/03/24 2013 06/03/24  1622   POC GLUCOSE mg/dl 104 83 114 106 92 63* 122 114 82 96 121 136               Lines/Drains:  Invasive Devices       Peripheral Intravenous Line  Duration             Peripheral IV 06/03/24 Right Antecubital 3 days    Peripheral IV 06/04/24 Dorsal (posterior);Left Hand 2 days              Drain  Duration             Urethral Catheter Non-latex 16 Fr. 2 days                  Urinary Catheter:  Goal for removal:  Accurate monitoring of I's and O's.  Attempt removal once CLOVIS resolving.           Telemetry:  Telemetry Orders (From admission, onward)               24 Hour Telemetry Monitoring  Continuous x 24 Hours (Telem)        Question:  Reason for 24 Hour Telemetry  Answer:  Decompensated CHF-  and any one of the following: continuous diuretic infusion or total diuretic dose >200 mg daily, associated electrolyte derangement (I.e. K < 3.0), ionotropic drip (continuous infusion), hx of ventricular arrhythmia, or new EF < 35%                     Telemetry Reviewed: Normal Sinus Rhythm  Indication for Continued Telemetry Use: Acute CHF on >200 mg lasix/day or equivalent dose or with new reduced EF.              Imaging: No pertinent imaging reviewed.    Recent Cultures (last 7 days):         Last 24 Hours Medication List:   Current Facility-Administered Medications   Medication Dose Route Frequency Provider Last Rate    acetaminophen  650 mg Oral Q6H PRN Les Batista MD      amLODIPine  10 mg Oral Daily Les Batista MD      atorvastatin  80 mg Oral Daily Les Batista MD      bumetanide (BUMEX) 12.5 mg infusion 50 mL  0.5 mg/hr Intravenous Continuous Bandar Timmons MD 0.5 mg/hr (06/06/24 0845)    carvedilol  6.25 mg Oral BID With Meals Les Batista MD      Cholecalciferol  5,000 Units Oral Daily Bandar Timmons MD      cyanocobalamin  1,000 mcg Oral Daily PETRA Cleary      heparin (porcine)  5,000 Units Subcutaneous Q8H [facility] Les Batista MD      HYDROmorphone  0.2 mg Intravenous Once Criss Gutiérrez PA-C      insulin glargine  10 Units Subcutaneous HS PETRA Cleary      insulin lispro  1-6 Units Subcutaneous HS Les Batista MD      insulin lispro  2-12 Units Subcutaneous TID AC Les Batista MD      iron sucrose  200 mg Intravenous Every Other Day Bandar Timmons MD      nystatin  1 Application Topical BID PETRA Cleary      oxyCODONE  5 mg Oral Q2H PRN Max 8/day PETRA Cleary      oxyCODONE  2.5 mg Oral Q4H PRN PETRA Cleary      sodium hypochlorite  1 Application Irrigation Daily Les Batista MD          Today, Patient Was Seen By: Criss Gutiérrez  [email or web address]    **Please Note: This note may have been constructed using a voice recognition system.**

## 2024-06-06 NOTE — ASSESSMENT & PLAN NOTE
POA  VQ scan low suspicion for PE  Echo with normal EF and grade 1 diastolic dysfunction  Cardiology input appreciated  ASA on hold for renal biopsy   Denies any further chest pain complaints today

## 2024-06-06 NOTE — ASSESSMENT & PLAN NOTE
Patient with evidence of volume overload on admission as evidenced by increased edema of the left lower extremity which already has chronic lymphedema.  He also has noticed increased abdominal girth and swelling in his pannus.  ECHO: LVEF 58%, diastolic function is mildly abnormal, consistent with grade 1 relaxation, bilateral atrial dilation, mitral valve with mild regurgitation, tricuspid valve with mild regurgitation, PA pressure 58 mmHg, there is mild pulmonic valve regurgitation  Continue lewis catheter for accurate I's and O's given body habitus  Both nephrology and cardiology are following for volume management  Continue IV Bumex infusion  Initiation of HD on 6/7/24  Patient on a renally restrictive diet.  Monitor daily weights.

## 2024-06-06 NOTE — PLAN OF CARE
Problem: Prexisting or High Potential for Compromised Skin Integrity  Goal: Skin integrity is maintained or improved  Description: INTERVENTIONS:  - Identify patients at risk for skin breakdown  - Assess and monitor skin integrity  - Assess and monitor nutrition and hydration status  - Monitor labs   - Assess for incontinence   - Turn and reposition patient  - Assist with mobility/ambulation  - Relieve pressure over bony prominences  - Avoid friction and shearing  - Provide appropriate hygiene as needed including keeping skin clean and dry  - Evaluate need for skin moisturizer/barrier cream  - Collaborate with interdisciplinary team   - Patient/family teaching  - Consider wound care consult   Outcome: Progressing     Problem: PAIN - ADULT  Goal: Verbalizes/displays adequate comfort level or baseline comfort level  Description: Interventions:  - Encourage patient to monitor pain and request assistance  - Assess pain using appropriate pain scale  - Administer analgesics based on type and severity of pain and evaluate response  - Implement non-pharmacological measures as appropriate and evaluate response  - Consider cultural and social influences on pain and pain management  - Notify physician/advanced practitioner if interventions unsuccessful or patient reports new pain  Outcome: Progressing     Problem: INFECTION - ADULT  Goal: Absence or prevention of progression during hospitalization  Description: INTERVENTIONS:  - Assess and monitor for signs and symptoms of infection  - Monitor lab/diagnostic results  - Monitor all insertion sites, i.e. indwelling lines, tubes, and drains  - [place] appropriate cooling/warming therapies per order  - Administer medications as ordered  - Instruct and encourage patient and family to use good hand hygiene technique  - Identify and instruct in appropriate isolation precautions for identified infection/condition  Outcome: Progressing  Goal: Absence of fever/infection during  neutropenic period  Description: INTERVENTIONS:  - Monitor WBC    Outcome: Progressing     Problem: SAFETY ADULT  Goal: Patient will remain free of falls  Description: INTERVENTIONS:  - Educate patient/family on patient safety including physical limitations  - Instruct patient to call for assistance with activity   - Consult OT/PT to assist with strengthening/mobility   - Keep Call bell within reach  - Keep bed low and locked with side rails adjusted as appropriate  - Keep care items and personal belongings within reach  - Initiate and maintain comfort rounds  - Make Fall Risk Sign visible to staff  - Apply yellow socks and bracelet for high fall risk patients  - Consider moving patient to room near nurses station  Outcome: Progressing  Goal: Maintain or return to baseline ADL function  Description: INTERVENTIONS:  -  Assess patient's ability to carry out ADLs; assess patient's baseline for ADL function and identify physical deficits which impact ability to perform ADLs (bathing, care of mouth/teeth, toileting, grooming, dressing, etc.)  - Assess/evaluate cause of self-care deficits   - Assess range of motion  - Assess patient's mobility; develop plan if impaired  - Assess patient's need for assistive devices and provide as appropriate  - Encourage maximum independence but intervene and supervise when necessary  - Involve family in performance of ADLs  - Assess for home care needs following discharge   - Consider OT consult to assist with ADL evaluation and planning for discharge  - Provide patient education as appropriate  Outcome: Progressing  Goal: Maintains/Returns to pre admission functional level  Description: INTERVENTIONS:  - Perform AM-PAC 6 Click Basic Mobility/ Daily Activity assessment daily.  - Set and communicate daily mobility goal to care team and patient/family/caregiver.   - Collaborate with rehabilitation services on mobility goals if consulted  - Out of bed for toileting  - Record patient  progress and toleration of activity level   Outcome: Progressing     Problem: CARDIOVASCULAR - ADULT  Goal: Maintains optimal cardiac output and hemodynamic stability  Description: INTERVENTIONS:  - Monitor I/O, vital signs and rhythm  - Monitor for S/S and trends of decreased cardiac output  - Administer and titrate ordered vasoactive medications to optimize hemodynamic stability  - Assess quality of pulses, skin color and temperature  - Assess for signs of decreased coronary artery perfusion  - Instruct patient to report change in severity of symptoms  Outcome: Progressing  Goal: Absence of cardiac dysrhythmias or at baseline rhythm  Description: INTERVENTIONS:  - Continuous cardiac monitoring, vital signs, obtain 12 lead EKG if ordered  - Administer antiarrhythmic and heart rate control medications as ordered  - Monitor electrolytes and administer replacement therapy as ordered  Outcome: Progressing     Problem: RESPIRATORY - ADULT  Goal: Achieves optimal ventilation and oxygenation  Description: INTERVENTIONS:  - Assess for changes in respiratory status  - Assess for changes in mentation and behavior  - Position to facilitate oxygenation and minimize respiratory effort  - Oxygen administered by appropriate delivery if ordered  - Initiate smoking cessation education as indicated  - Encourage broncho-pulmonary hygiene including cough, deep breathe, Incentive Spirometry  - Assess the need for suctioning and aspirate as needed  - Assess and instruct to report SOB or any respiratory difficulty  - Respiratory Therapy support as indicated  Outcome: Progressing     Problem: SKIN/TISSUE INTEGRITY - ADULT  Goal: Incision(s), wounds(s) or drain site(s) healing without S/S of infection  Description: INTERVENTIONS  - Assess and document dressing, incision, wound bed, drain sites and surrounding tissue  - Provide patient and family education  - Perform skin care/dressing changes  Outcome: Progressing

## 2024-06-06 NOTE — QUICK NOTE
Per IR, there was bleeding at access site after placement of hemodialysis catheter.  We will give DDAVP (low-dose) to prevent further bleeding in setting of possibility of uremic platelets.  Serum sodium was 133 this morning.  We will restrict fluid intake to 1200 cc per 24 hours.  Hopefully he will not have hyponatremia because he is currently on a Bumex drip and will have excretion of free water.

## 2024-06-06 NOTE — PLAN OF CARE
Problem: Prexisting or High Potential for Compromised Skin Integrity  Goal: Skin integrity is maintained or improved  Description: INTERVENTIONS:  - Identify patients at risk for skin breakdown  - Assess and monitor skin integrity  - Assess and monitor nutrition and hydration status  - Monitor labs   - Assess for incontinence   - Turn and reposition patient  - Assist with mobility/ambulation  - Relieve pressure over bony prominences  - Avoid friction and shearing  - Provide appropriate hygiene as needed including keeping skin clean and dry  - Evaluate need for skin moisturizer/barrier cream  - Collaborate with interdisciplinary team   - Patient/family teaching  - Consider wound care consult   Outcome: Progressing     Problem: PAIN - ADULT  Goal: Verbalizes/displays adequate comfort level or baseline comfort level  Description: Interventions:  - Encourage patient to monitor pain and request assistance  - Assess pain using appropriate pain scale  - Administer analgesics based on type and severity of pain and evaluate response  - Implement non-pharmacological measures as appropriate and evaluate response  - Consider cultural and social influences on pain and pain management  - Notify physician/advanced practitioner if interventions unsuccessful or patient reports new pain  Outcome: Progressing     Problem: INFECTION - ADULT  Goal: Absence or prevention of progression during hospitalization  Description: INTERVENTIONS:  - Assess and monitor for signs and symptoms of infection  - Monitor lab/diagnostic results  - Monitor all insertion sites, i.e. indwelling lines, tubes, and drains  - [place] appropriate cooling/warming therapies per order  - Administer medications as ordered  - Instruct and encourage patient and family to use good hand hygiene technique  - Identify and instruct in appropriate isolation precautions for identified infection/condition  Outcome: Progressing  Goal: Absence of fever/infection during  neutropenic period  Description: INTERVENTIONS:  - Monitor WBC    Outcome: Progressing     Problem: SAFETY ADULT  Goal: Patient will remain free of falls  Description: INTERVENTIONS:  - Educate patient/family on patient safety including physical limitations  - Instruct patient to call for assistance with activity   - Consult OT/PT to assist with strengthening/mobility   - Keep Call bell within reach  - Keep bed low and locked with side rails adjusted as appropriate  - Keep care items and personal belongings within reach  - Initiate and maintain comfort rounds  - Make Fall Risk Sign visible to staff  - Apply yellow socks and bracelet for high fall risk patients  - Consider moving patient to room near nurses station  Outcome: Progressing  Goal: Maintain or return to baseline ADL function  Description: INTERVENTIONS:  -  Assess patient's ability to carry out ADLs; assess patient's baseline for ADL function and identify physical deficits which impact ability to perform ADLs (bathing, care of mouth/teeth, toileting, grooming, dressing, etc.)  - Assess/evaluate cause of self-care deficits   - Assess range of motion  - Assess patient's mobility; develop plan if impaired  - Assess patient's need for assistive devices and provide as appropriate  - Encourage maximum independence but intervene and supervise when necessary  - Involve family in performance of ADLs  - Assess for home care needs following discharge   - Consider OT consult to assist with ADL evaluation and planning for discharge  - Provide patient education as appropriate  Outcome: Progressing  Goal: Maintains/Returns to pre admission functional level  Description: INTERVENTIONS:  - Perform AM-PAC 6 Click Basic Mobility/ Daily Activity assessment daily.  - Set and communicate daily mobility goal to care team and patient/family/caregiver.   - Collaborate with rehabilitation services on mobility goals if consulted  - Out of bed for toileting  - Record patient  progress and toleration of activity level   Outcome: Progressing     Problem: CARDIOVASCULAR - ADULT  Goal: Maintains optimal cardiac output and hemodynamic stability  Description: INTERVENTIONS:  - Monitor I/O, vital signs and rhythm  - Monitor for S/S and trends of decreased cardiac output  - Administer and titrate ordered vasoactive medications to optimize hemodynamic stability  - Assess quality of pulses, skin color and temperature  - Assess for signs of decreased coronary artery perfusion  - Instruct patient to report change in severity of symptoms  Outcome: Progressing  Goal: Absence of cardiac dysrhythmias or at baseline rhythm  Description: INTERVENTIONS:  - Continuous cardiac monitoring, vital signs, obtain 12 lead EKG if ordered  - Administer antiarrhythmic and heart rate control medications as ordered  - Monitor electrolytes and administer replacement therapy as ordered  Outcome: Progressing     Problem: RESPIRATORY - ADULT  Goal: Achieves optimal ventilation and oxygenation  Description: INTERVENTIONS:  - Assess for changes in respiratory status  - Assess for changes in mentation and behavior  - Position to facilitate oxygenation and minimize respiratory effort  - Oxygen administered by appropriate delivery if ordered  - Initiate smoking cessation education as indicated  - Encourage broncho-pulmonary hygiene including cough, deep breathe, Incentive Spirometry  - Assess the need for suctioning and aspirate as needed  - Assess and instruct to report SOB or any respiratory difficulty  - Respiratory Therapy support as indicated  Outcome: Progressing     Problem: SKIN/TISSUE INTEGRITY - ADULT  Goal: Incision(s), wounds(s) or drain site(s) healing without S/S of infection  Description: INTERVENTIONS  - Assess and document dressing, incision, wound bed, drain sites and surrounding tissue  - Provide patient and family education  - Perform skin care/dressing changes  Outcome: Progressing

## 2024-06-06 NOTE — CONSULTS
e-Consult (IPC)  - Interventional Radiology  Joaquin Frankel 44 y.o. male MRN: [id number]  Unit/Bed#: [address] [place] [address] Encounter: [id number]          Interventional Radiology has been consulted to evaluate Joaquin Frankel    We were consulted by Nephrology concerning this patient with CLOVIS on CKD, now with uremic symptoms and need to initiate hemodialysis.      We were previously consulted for random kidney biopsy to evaluate the CLOVIS, which is planned for next week.    Inpatient Consult to IR  Consult performed by: Radames Liu MD  Consult ordered by: Bandar Timmons MD        06/06/24    Assessment/Recommendation:   Will plan for temporary non-tunneled HD catheter placement today, timing pending IR availability.    5-10 minutes, >50% of the total time devoted to medical consultative verbal/EMR discussion between providers. Written report will be generated in the EMR.     Thank you for allowing Interventional Radiology to participate in the care of Joaquin Frankel. Please don't hesitate to call or TigerText us with any questions.     Radames Liu MD

## 2024-06-06 NOTE — ASSESSMENT & PLAN NOTE
Lab Results   Component Value Date    HGBA1C 6.6 (H) 04/29/2024     Recent Labs     06/05/24  1552 06/05/24 2051 06/06/24  0737 06/06/24  1113   POCGLU 106 114 83 104       Blood Sugar Average: Last 72 hrs:  (P) [id number]    Amaryl held during his hospitalization.  Resume at discharge.    Continue Lantus 10 units at bedtime along with sliding scale insulin.    Continue diabetic diet.

## 2024-06-06 NOTE — CONSULTS
Consultation - Medical Oncology   Joaquin Frankel 44 y.o. male MRN: [id number]  Unit/Bed#: [address] [place] [address] Encounter: [id number]      Assessment & Plan   Patient presented for evaluation of chest pain and shortness of breath, was found to have progressively worsening renal function along with anemia with a hemoglobin of about 9 on presentation.    He had a UPEP drawn which showed a monoclonal band in the gamma region.    SPEP showed M spike of 0.26.  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  Kappa free light chain is 238.4.  Lambda free light chain is 174.5.  Ratio was normal at 1.37. His total protein is normal.  His calcium is normal.    He had TDC placed today. He is being scheduled for kidney biopsy.    He has lymphedema along with abdominal wound.    Will check additional lab work to further assess the anemia.    Anemia in this case is certainly related to renal disease, likely also related to chronic disease/inflammation.    His hemoglobin is 8.0 today.  He is receiving IV iron at the discretion of nephrology/ primary team.  Recommend to transfuse as needed.    Additional lab work added to further assess the monoclonal protein.    History of Present Illness   Physician Requesting Consult: July Kendall MD  Reason for Consult / Principal Problem: Anemia.  HPI: Joaquin Frankel is a 44 y.o. year old male with past medical history significant for diabetes, morbid obesity, hypertension.    He presented to the ED several days ago with complaints of chest pain and shortness of breath, progressively worsening for the prior 2 weeks.    He was found to have anemia with hemoglobin of 9.0.  He was also found to have CLOVIS on CKD.  His creatinine was 6.40 on presentation.  He was also noted to have elevated D-dimer.  VQ scan showed low probability for PE.    He is being seen by nephrology.  His baseline creatinine is apparently 1.2-1.6.  He is being scheduled for renal biopsy. He also had  "TDC placed earlier today in anticipation of HD.    Patient also has chronic left lower extremity lymphedema along with abdominal wound which is being packed by wound care.     Patient was seen and examined at bedside today. He says that he noticed himself gaining weight of about 20 pounds over a period of around 1 weeks time prior to his presentation. He also noticed progressive shortness of breath.     He denies any prior history of hematologic issues. No family history of cancer/ heme issues.    Currently, he denies nausea, vomiting, bowel changes. He was found to have increased bleeding following placement of his TDC earlier today. Renal biopsy is pending due to receiving aspirin during this hospital stay.    Inpatient consult to Hematology  Consult performed by: Amy Merino PA-C  Consult ordered by: PETRA Cleary        ROS:   12 point review of systems negative unless stated in the HPI.    Historical Information   Past Medical History:   Diagnosis Date    Acute hypoxic respiratory failure (HCC) 10/07/2023    Arthritis     Breathing difficulty     Cellulitis 10/07/2023    Coronary artery disease     Diabetes mellitus ([facility])     Diabetic neuropathy ([facility]) 05/28/2021    Heart disease     CAD   s/p ptca with 1 stent 2012    Hidradenitis suppurativa     groin    History of transfusion     Hyperlipidemia     Hypertension     MI (myocardial infarction) ([facility])     \" silent \" M.I. in the past    Morbid obesity with BMI of 50.0-59.9, adult ([facility])     Nicotine dependence     Obesity     Pilonidal cyst     Type 1 non-ST elevation myocardial infarction (NSTEMI) ([facility]) 11/29/2020     Past Surgical History:   Procedure Laterality Date    CARDIAC SURGERY      CORONARY ANGIOPLASTY WITH STENT PLACEMENT      INCISION AND DRAINAGE OF WOUND N/A 1/24/2017    Procedure: INCISION AND DRAINAGE (I&D) BUTTOCK, PILONIDAL CYST;  Surgeon: Simba Adhikari MD;  Location: [facility];  Service:     LEG SURGERY Left     I&D of left leg " 2012    OH DEBRIDEMENT SUBCUTANEOUS TISSUE 1ST 20 SQ CM/< Left 7/6/2021    Procedure: DEBRIDEMENT WOUND (WASH OUT);  Surgeon: Sudheer Mcfarlane MD;  [facility];  Service: General    OH EXC B9 LESION MRGN XCP SK TG T/A/L >4.0 CM Left 4/6/2021    Procedure: EXCISION THIGH MASS;  Surgeon: Sudheer Mcfarlane MD;  [facility];  Service: General    OH EXCISION H/P/P/U COMPLEX REPAIR Left 7/6/2021    Procedure: EXCISION PERINEAL ABSCESS LEFT THIGH;  Surgeon: Sudheer Mcfarlane MD;  [facility];  Service: General    OH NEGATIVE PRESSURE WOUND THERAPY DME <= 50 SQ CM Left 4/6/2021    Procedure: APPLICATION VAC DRESSING THIGH;  Surgeon: Sudheer Mcfarlane MD;  [facility];  Service: General    WOUND DEBRIDEMENT Left 4/17/2021    Procedure: DEBRIDEMENT WOUND AND DRESSING CHANGE (WASH OUT);  Surgeon: Mahin Traore DO;  [facility];  Service: General    WOUND DEBRIDEMENT Left 4/22/2021    Procedure: DEBRIDEMENT LOWER EXTREMITY (WASH OUT), left groin and thigh;  Surgeon: Sudheer Mcfarlane MD;  [facility];  Service: General    WOUND DEBRIDEMENT Left 5/26/2021    Procedure: DEBRIDEMENT LOWER EXTREMITY (WASH OUT);  Surgeon: Zak Castanon DO;  [facility];  Service: General    WOUND DEBRIDEMENT Left 5/28/2021    Procedure: Washout left thigh wound and closure;  Surgeon: Amor Jaramillo DO;  [facility];  Service: General     Social History   Social History     Substance and Sexual Activity   Alcohol Use Not Currently    Comment: rarely     Social History     Substance and Sexual Activity   Drug Use No     E-Cigarette/Vaping    E-Cigarette Use Never User      E-Cigarette/Vaping Substances    Nicotine No     THC No     CBD No     Flavoring No      Social History     Tobacco Use   Smoking Status Former    Current packs/day: 0.00    Average packs/day: 1.5 packs/day for 20.0 years (29.9 ttl pk-yrs)    Types: Cigarettes    Start date: 01/2021    Quit date: 03/2021    Years since quitting:  3.2    Passive exposure: Past   Smokeless Tobacco Never     Family History:   Family History   Problem Relation Age of Onset    Heart disease Father     Diabetes Father     Hypertension Mother     Heart disease Mother        Meds/Allergies   current meds:   Current Facility-Administered Medications   Medication Dose Route Frequency    acetaminophen (TYLENOL) tablet 650 mg  650 mg Oral Q6H PRN    amLODIPine (NORVASC) tablet 10 mg  10 mg Oral Daily    atorvastatin (LIPITOR) tablet 80 mg  80 mg Oral Daily    bumetanide (BUMEX) 12.5 mg infusion 50 mL  0.5 mg/hr Intravenous Continuous    carvedilol (COREG) tablet 6.25 mg  6.25 mg Oral BID With Meals    Cholecalciferol (VITAMIN D3) tablet 5,000 Units  5,000 Units Oral Daily    cyanocobalamin (VITAMIN B-12) tablet 1,000 mcg  1,000 mcg Oral Daily    desmopressin (DDAVP) 20 mcg in sodium chloride 0.9 % 50 mL IVPB  0.3 mcg/kg (Ideal) Intravenous Once    heparin (porcine) subcutaneous injection 5,000 Units  5,000 Units Subcutaneous Q8H SHAZIA    HYDROmorphone HCl (DILAUDID) injection 0.2 mg  0.2 mg Intravenous Once    insulin glargine (LANTUS) subcutaneous injection 10 Units 0.1 mL  10 Units Subcutaneous HS    insulin lispro (HumALOG/ADMELOG) 100 units/mL subcutaneous injection 1-6 Units  1-6 Units Subcutaneous HS    insulin lispro (HumALOG/ADMELOG) 100 units/mL subcutaneous injection 2-12 Units  2-12 Units Subcutaneous TID AC    iron sucrose (VENOFER) 200 mg in sodium chloride 0.9 % 100 mL IVPB  200 mg Intravenous Every Other Day    nystatin (MYCOSTATIN) powder 1 Application  1 Application Topical BID    oxyCODONE (ROXICODONE) IR tablet 5 mg  5 mg Oral Q2H PRN Max 8/day    oxyCODONE (ROXICODONE) split tablet 2.5 mg  2.5 mg Oral Q4H PRN    sodium hypochlorite (DAKIN'S HALF-STRENGTH) 0.25 percent topical solution 1 Application  1 Application Irrigation Daily     Allergies   Allergen Reactions    Keflex [Cephalexin] Facial Swelling and Lip Swelling    Amoxicillin Hives      "childhood       Objective   Vitals: Blood pressure 169/86, pulse 78, temperature 98 °F (36.7 °C), temperature source Oral, resp. rate 20, height 5' 7\" (1.702 m), weight (!) 185 kg (408 lb 14.4 oz), SpO2 94%.    Intake/Output Summary (Last 24 hours) at 6/6/2024 1517  Last data filed at 6/6/2024 1512  Gross per 24 hour   Intake 500 ml   Output 1400 ml   Net -900 ml     Invasive Devices       Peripheral Intravenous Line  Duration             Peripheral IV 06/03/24 Right Antecubital 3 days    Peripheral IV 06/04/24 Dorsal (posterior);Left Hand 2 days              Hemodialysis Catheter  Duration             HD Temporary Double Catheter <1 day              Drain  Duration             Urethral Catheter Non-latex 16 Fr. 2 days                    PHYSICAL EXAM:  - GENERAL: Alert and oriented x 3. No acute distress. Obese.  - EYES: EOMI. Anicteric.  - HENT: Moist mucous membranes.   - LUNGS: Clear to auscultation bilaterally.   - CARDIOVASCULAR: Distant heart sounds.  - ABDOMEN: Soft, obese, non-tender and non-distended. Wound packed- not examined.  - EXTREMITIES: Lymphedema LLE. RLE 1-2+ edema present.?SKIN: No rashes or lesions. Warm.  - NEUROLOGIC: No focal neurological deficits. CN II-XII grossly intact, but not individually tested.  - PSYCHIATRIC: Cooperative. Appropriate mood and affect.     Lab Results: CBC:   Lab Results   Component Value Date    WBC 9.66 06/06/2024    HGB 8.0 (L) 06/06/2024    HCT 25.0 (L) 06/06/2024    MCV 88 06/06/2024     06/06/2024    RBC 2.84 (L) 06/06/2024    MCH 28.2 06/06/2024    MCHC 32.0 06/06/2024    RDW 14.8 06/06/2024    MPV 9.1 06/06/2024     CMP:   Lab Results   Component Value Date    SODIUM 133 (L) 06/06/2024     06/06/2024    CO2 20 (L) 06/06/2024    BUN 73 (H) 06/06/2024    CREATININE 8.10 (H) 06/06/2024    CALCIUM 7.6 (L) 06/06/2024    EGFR 7 06/06/2024     Imaging Studies: I have personally reviewed pertinent reports.    Pathology, and Other Studies: I have " personally reviewed pertinent reports.      Counseling / Coordination of Care  Total floor / unit time spent today 75 minutes. Greater than 50% of total time was spent with the patient and / or family counseling and / or coordination of care.

## 2024-06-06 NOTE — PLAN OF CARE
Problem: Prexisting or High Potential for Compromised Skin Integrity  Goal: Skin integrity is maintained or improved  Description: INTERVENTIONS:  - Identify patients at risk for skin breakdown  - Assess and monitor skin integrity  - Assess and monitor nutrition and hydration status  - Monitor labs   - Assess for incontinence   - Turn and reposition patient  - Assist with mobility/ambulation  - Relieve pressure over bony prominences  - Avoid friction and shearing  - Provide appropriate hygiene as needed including keeping skin clean and dry  - Evaluate need for skin moisturizer/barrier cream  - Collaborate with interdisciplinary team   - Patient/family teaching  - Consider wound care consult   6/6/2024 0735 by Margoth Niño RN  Outcome: Progressing  6/6/2024 0735 by Margoth Niño RN  Outcome: Progressing     Problem: PAIN - ADULT  Goal: Verbalizes/displays adequate comfort level or baseline comfort level  Description: Interventions:  - Encourage patient to monitor pain and request assistance  - Assess pain using appropriate pain scale  - Administer analgesics based on type and severity of pain and evaluate response  - Implement non-pharmacological measures as appropriate and evaluate response  - Consider cultural and social influences on pain and pain management  - Notify physician/advanced practitioner if interventions unsuccessful or patient reports new pain  6/6/2024 0735 by Margoth Niño RN  Outcome: Progressing  6/6/2024 0735 by Margoth Niño RN  Outcome: Progressing     Problem: INFECTION - ADULT  Goal: Absence or prevention of progression during hospitalization  Description: INTERVENTIONS:  - Assess and monitor for signs and symptoms of infection  - Monitor lab/diagnostic results  - Monitor all insertion sites, i.e. indwelling lines, tubes, and drains  - [place] appropriate cooling/warming therapies per order  - Administer medications as ordered  - Instruct and encourage patient and  family to use good hand hygiene technique  - Identify and instruct in appropriate isolation precautions for identified infection/condition  6/6/2024 0735 by Margoth Niño RN  Outcome: Progressing  6/6/2024 0735 by Margoth Niño RN  Outcome: Progressing  Goal: Absence of fever/infection during neutropenic period  Description: INTERVENTIONS:  - Monitor WBC    6/6/2024 0735 by Margoth Niño RN  Outcome: Progressing  6/6/2024 0735 by Margoth Niño RN  Outcome: Progressing     Problem: SAFETY ADULT  Goal: Patient will remain free of falls  Description: INTERVENTIONS:  - Educate patient/family on patient safety including physical limitations  - Instruct patient to call for assistance with activity   - Consult OT/PT to assist with strengthening/mobility   - Keep Call bell within reach  - Keep bed low and locked with side rails adjusted as appropriate  - Keep care items and personal belongings within reach  - Initiate and maintain comfort rounds  - Make Fall Risk Sign visible to staff  - Apply yellow socks and bracelet for high fall risk patients  - Consider moving patient to room near nurses station  6/6/2024 0735 by Margoth Niño RN  Outcome: Progressing  6/6/2024 0735 by Margoth Niño RN  Outcome: Progressing  Goal: Maintain or return to baseline ADL function  Description: INTERVENTIONS:  -  Assess patient's ability to carry out ADLs; assess patient's baseline for ADL function and identify physical deficits which impact ability to perform ADLs (bathing, care of mouth/teeth, toileting, grooming, dressing, etc.)  - Assess/evaluate cause of self-care deficits   - Assess range of motion  - Assess patient's mobility; develop plan if impaired  - Assess patient's need for assistive devices and provide as appropriate  - Encourage maximum independence but intervene and supervise when necessary  - Involve family in performance of ADLs  - Assess for home care needs following discharge   -  Consider OT consult to assist with ADL evaluation and planning for discharge  - Provide patient education as appropriate  6/6/2024 0735 by Margoth Niño RN  Outcome: Progressing  6/6/2024 0735 by Margoth Niño RN  Outcome: Progressing  Goal: Maintains/Returns to pre admission functional level  Description: INTERVENTIONS:  - Perform AM-PAC 6 Click Basic Mobility/ Daily Activity assessment daily.  - Set and communicate daily mobility goal to care team and patient/family/caregiver.   - Collaborate with rehabilitation services on mobility goals if consulted  - Out of bed for toileting  - Record patient progress and toleration of activity level   6/6/2024 0735 by Margoth Niño RN  Outcome: Progressing  6/6/2024 0735 by Margoth Niño RN  Outcome: Progressing     Problem: CARDIOVASCULAR - ADULT  Goal: Maintains optimal cardiac output and hemodynamic stability  Description: INTERVENTIONS:  - Monitor I/O, vital signs and rhythm  - Monitor for S/S and trends of decreased cardiac output  - Administer and titrate ordered vasoactive medications to optimize hemodynamic stability  - Assess quality of pulses, skin color and temperature  - Assess for signs of decreased coronary artery perfusion  - Instruct patient to report change in severity of symptoms  6/6/2024 0735 by Margoth Niño RN  Outcome: Progressing  6/6/2024 0735 by Margoth Niño RN  Outcome: Progressing  Goal: Absence of cardiac dysrhythmias or at baseline rhythm  Description: INTERVENTIONS:  - Continuous cardiac monitoring, vital signs, obtain 12 lead EKG if ordered  - Administer antiarrhythmic and heart rate control medications as ordered  - Monitor electrolytes and administer replacement therapy as ordered  6/6/2024 0735 by Margoth Niño RN  Outcome: Progressing  6/6/2024 0735 by Margoth Niño RN  Outcome: Progressing     Problem: RESPIRATORY - ADULT  Goal: Achieves optimal ventilation and oxygenation  Description:  INTERVENTIONS:  - Assess for changes in respiratory status  - Assess for changes in mentation and behavior  - Position to facilitate oxygenation and minimize respiratory effort  - Oxygen administered by appropriate delivery if ordered  - Initiate smoking cessation education as indicated  - Encourage broncho-pulmonary hygiene including cough, deep breathe, Incentive Spirometry  - Assess the need for suctioning and aspirate as needed  - Assess and instruct to report SOB or any respiratory difficulty  - Respiratory Therapy support as indicated  6/6/2024 0735 by Margoth Niño RN  Outcome: Progressing  6/6/2024 0735 by Margoth Niño RN  Outcome: Progressing     Problem: SKIN/TISSUE INTEGRITY - ADULT  Goal: Incision(s), wounds(s) or drain site(s) healing without S/S of infection  Description: INTERVENTIONS  - Assess and document dressing, incision, wound bed, drain sites and surrounding tissue  - Provide patient and family education  - Perform skin care/dressing changes  6/6/2024 0735 by Margoth Niño RN  Outcome: Progressing  6/6/2024 0735 by Margoth Niño RN  Outcome: Progressing

## 2024-06-06 NOTE — ASSESSMENT & PLAN NOTE
Found by wound care nurse that patient has a left lower quadrant abdominal wound with tracking and purulent drainage for which she recommended general surgery consultation  Appreciate surgery input  No acute surgical intervention at this time  Continue local wound care per wound care nurse  Follow-up in the outpatient wound center

## 2024-06-06 NOTE — BRIEF OP NOTE (RAD/CATH)
INTERVENTIONAL RADIOLOGY PROCEDURE NOTE    Date: 6/6/2024    Procedure:   Procedure Summary       Date:  Room / Location:     Anesthesia Start:  Anesthesia Stop:     Procedure:  Diagnosis:     Scheduled Providers:  Responsible Provider:     Anesthesia Type: Not recorded ASA Status: Not recorded            Preoperative diagnosis:   1. Dyspnea on exertion    2. Chest pain    3. Hypoxia    4. Renal failure    5. CLOVIS (acute kidney injury) (HCC)    6. Wound drainage    7. Anemia of chronic disease    8. IgG gammopathy         Postoperative diagnosis: Same.    Surgeon: Radames Lui MD     Assistant: None. No qualified resident was available.    Blood loss: 200 mL    Specimens: None     Findings:   Successful placement of right IJ 14 Fr x 24 cm non-tunneled (temporary) hemodialysis catheter.  Tip terminates in the right atrium.  Catheter ready for immediate use.    The course of this catheter is amenable to conversion to tunneled HD catheter if required.    Impaired hemostasis consistent with patient's known renal dysfunction.  Bleeding at access site controlled with pursestring suture (3-0 Vicryl) around the catheter.      Complications: None immediate.    Anesthesia: local

## 2024-06-06 NOTE — PROGRESS NOTES
NEPHROLOGY HOSPITAL PROGRESS NOTE   Joaquin Frankel 44 y.o. male MRN: [id number]  Unit/Bed#: [address] [place] [address] Encounter: [id number]  Reason for Consult: CLOVIS    ASSESSMENT and PLAN:  Joaquin Frankel is a 44 y.o. male who was admitted to [facility] after presenting with shortness of breath and chest pain. A renal consultation is requested today for assistance in the management of CLOVIS.     CLOVIS.  Etiology of CLOVIS rule out MGRS +/-? cardiorenal syndrome on baseline of diabetic nephropathy +/- autoregulatory failure in setting of RAAS blockade +/- heavy NSAID use +/- rule out glomerulonephritis.  Baseline creatinine appears to be around 1.2-1.6.  Creatinine was rising as outpatient with most recent creatinine of 2.98 on 04/29/2024.  Creatinine on admission 6.40.  Creatinine today 8.10.  Urinalysis showing 10-20 RBC, 2-4 WBC.  No hydronephrosis on CT abdomen without contrast on admission.   Cardiac BNP elevated at 233 but can be falsely low in setting of morbid obesity.  Chest x-ray with signs of pulmonary vascular congestion.  CT chest with mosaic attenuation.  Serum and urine immunofixation showing IgG kappa monoclonal band.  KL ratio within normal limits.  No hypocomplementemia.  JOSE ROBERTO negative.  Anti-GBM negative.  dsDNA and ANCA pending.  Status post IV Bumex 2 mg twice daily on 06/04 in 06/05.  He does not have significant response in the last 24 hours and will therefore switch to IV Bumex drip at 0.5 mg/h.  We will obtain kidney biopsy for further evaluation of severe CLOVIS but this cannot be done until Wednesday 06/12 as last dose of aspirin was 06/05.  Given worsening CLOVIS, appearance of some uremia we will plan for initiation of hemodialysis.  IR has been consulted for placement of temporary hemodialysis catheter.      2.  Chronic kidney disease.  Most likely diabetic nephropathy.  Baseline creatinine appears to be around 1.2-1.6.  UCR 5200 mg/g 04/2024.     3.  Hypertension.  Home medications include  amlodipine 10 mg daily, Coreg 6.25 mg twice daily, lisinopril 20 mg daily.  Blood pressure is currently improved with current dose of Coreg and amlodipine.  Keep holding lisinopril in setting of CLOVIS.     4.  Anemia in CKD.  Hemoglobin 8.0 today.  Ferritin 69, iron saturation 17 consistent with absolute iron deficiency.  Start IV Venofer 200 mg every other day for 5 doses.  Given IgG kappa monoclonal band, we have requested hematology evaluation.    5.  Chest pain on admission.  VQ scan with low suspicion for PE.  IV heparin has been discontinued.  Echocardiogram with normal EF and grade 1 diastolic dysfunction.  Discussed with cardiology and aspirin held for anticipation of kidney biopsy.    6.  Secondary hyperparathyroidism.  .  Etiology most likely due to vitamin D deficiency +/- chronic kidney disease.  25-hydroxy vitamin D level less than 7.0.  Start cholecalciferol 5000 units daily.  Hold off initiation of calcitriol as patient has hyperphosphatemia.    7.  Hyponatremia.  Serum sodium level 133.  Most likely in setting of volume overload and lack of free water clearance in setting of severe CLOVIS.  Monitor with diuresis.    8.  Metabolic acidosis.  Serum bicarbonate level 20.  Monitor with diuresis.  Avoid sodium bicarbonate due to volume overload.    Discussed with internal medicine team.  After discussion, we agreed that kidney function continues to worsen with no good response to intermittent doses of IV Bumex and to start a Bumex infusion and to consult IR for placement of temporary hemodialysis catheter.     SUBJECTIVE / 24H INTERVAL HISTORY:  Urine output recorded as 600 cc.  Complains of pain in his legs.  Appetite is okay.  Has not ambulated much since admission.    OBJECTIVE:  Current Weight: Weight - Scale: (!) 185 kg (408 lb 14.4 oz)  Vitals:    06/05/24 1852 06/05/24 2218 06/06/24 0314 06/06/24 0539   BP: 112/56 120/58 116/52    BP Location: Left arm Left arm     Pulse: 64 67 61    Resp:        Temp: 98.1 °F (36.7 °C) 98.2 °F (36.8 °C)     TempSrc: Oral Oral     SpO2: 93% 93% 91%    Weight:    (!) 185 kg (408 lb 14.4 oz)   Height:           Intake/Output Summary (Last 24 hours) at 6/6/2024 0704  Last data filed at 6/6/2024 0311  Gross per 24 hour   Intake --   Output 600 ml   Net -600 ml     Review of Systems   Constitutional:  Negative for chills and fever.   HENT:  Negative for ear pain and sore throat.    Eyes:  Negative for pain and visual disturbance.   Respiratory:  Negative for cough and shortness of breath.    Cardiovascular:  Negative for chest pain and palpitations.   Gastrointestinal:  Negative for abdominal pain and vomiting.   Genitourinary:  Negative for dysuria and hematuria.   Musculoskeletal:  Negative for arthralgias and back pain.   Skin:  Negative for color change and rash.   Neurological:  Negative for seizures and syncope.   All other systems reviewed and are negative.    Physical Exam  Vitals and nursing note reviewed.   Constitutional:       General: He is not in acute distress.     Appearance: He is well-developed. He is obese.   HENT:      Head: Normocephalic and atraumatic.   Eyes:      Conjunctiva/sclera: Conjunctivae normal.   Cardiovascular:      Rate and Rhythm: Normal rate and regular rhythm.      Pulses: Normal pulses.      Heart sounds: Normal heart sounds. No murmur heard.  Pulmonary:      Effort: Pulmonary effort is normal. No respiratory distress.      Comments: Decreased breath sounds bilaterally at bases  Abdominal:      Palpations: Abdomen is soft.      Tenderness: There is no abdominal tenderness.   Musculoskeletal:         General: No swelling.      Cervical back: Neck supple.      Right lower leg: Edema present.      Left lower leg: Edema present.   Skin:     General: Skin is warm and dry.      Capillary Refill: Capillary refill takes less than 2 seconds.   Neurological:      Mental Status: He is alert.      Comments: Asterixis present   Psychiatric:          Mood and Affect: Mood normal.       Medications:    Current Facility-Administered Medications:     acetaminophen (TYLENOL) tablet 650 mg, 650 mg, Oral, Q6H PRN, Les Batista MD, 650 mg at 06/05/24 1144    amLODIPine (NORVASC) tablet 10 mg, 10 mg, Oral, Daily, Les Batista MD, 10 mg at 06/05/24 0819    atorvastatin (LIPITOR) tablet 80 mg, 80 mg, Oral, Daily, Les Batista MD, 80 mg at 06/05/24 0819    carvedilol (COREG) tablet 6.25 mg, 6.25 mg, Oral, BID With Meals, Les Batista MD, 6.25 mg at 06/05/24 1542    Cholecalciferol (VITAMIN D3) tablet 5,000 Units, 5,000 Units, Oral, Daily, Bandar Timmons MD    cyanocobalamin (VITAMIN B-12) tablet 1,000 mcg, 1,000 mcg, Oral, Daily, PETRA Cleary, 1,000 mcg at 06/05/24 1022    heparin (porcine) subcutaneous injection 5,000 Units, 5,000 Units, Subcutaneous, Q8H SHAZIA, Les Batista MD, 5,000 Units at 06/04/24 1421    insulin glargine (LANTUS) subcutaneous injection 10 Units 0.1 mL, 10 Units, Subcutaneous, HS, PETRA Cleary, 10 Units at 06/05/24 2100    insulin lispro (HumALOG/ADMELOG) 100 units/mL subcutaneous injection 1-6 Units, 1-6 Units, Subcutaneous, HS, Les Batista MD    insulin lispro (HumALOG/ADMELOG) 100 units/mL subcutaneous injection 2-12 Units, 2-12 Units, Subcutaneous, TID AC **AND** Fingerstick Glucose (POCT), , , TID AC, Les Batista MD    nystatin (MYCOSTATIN) powder 1 Application, 1 Application, Topical, BID, PETRA Cleary, 1 Application at 06/05/24 1724    oxyCODONE (ROXICODONE) IR tablet 5 mg, 5 mg, Oral, Q2H PRN Max 8/day, PETRA Cleary, 5 mg at 06/06/24 0308    oxyCODONE (ROXICODONE) split tablet 2.5 mg, 2.5 mg, Oral, Q4H PRN, PETRA Cleary, 2.5 mg at 06/06/24 0650    sodium hypochlorite (DAKIN'S HALF-STRENGTH) 0.25 percent topical solution 1 Application, 1 Application, Irrigation, Daily, Les Batista,  "MD    Laboratory Results:  Results from last 7 days   Lab Units 06/06/24  0312 06/05/24  0619 06/04/24  0836 06/04/24  0821 06/03/24  0714   WBC Thousand/uL 9.66 9.15 8.60  --  9.91   HEMOGLOBIN g/dL 8.0* 8.5* 8.0*  --  9.0*   HEMATOCRIT % 25.0* 27.0* 25.0*  --  27.7*   PLATELETS Thousands/uL 306 317 319  --  327   POTASSIUM mmol/L 4.1 4.2  --  4.2 3.8   CHLORIDE mmol/L 101 103  --  104 104   CO2 mmol/L 20* 24  --  21 21   BUN mg/dL 73* 71*  --  64* 62*   CREATININE mg/dL 8.10* 7.28*  --  6.87* 6.40*   CALCIUM mg/dL 7.6* 7.8*  --  7.9* 8.1*       Portions of the record may have been created with voice recognition software. Occasional wrong word or \"sound a like\" substitutions may have occurred due to the inherent limitations of voice recognition software. Read the chart carefully and recognize, using context, where substitutions have occurred. If you have any questions, please contact the dictating provider.    "

## 2024-06-06 NOTE — SEDATION DOCUMENTATION
Temp HD cath placed in the right IJ by Dr. Liu. Both lumens flushed, blood return noted, clamped, and capped. CHG dressing and sutures to site. Patient tolerated well.

## 2024-06-07 ENCOUNTER — APPOINTMENT (INPATIENT)
Dept: RADIOLOGY | Facility: HOSPITAL | Age: 44
DRG: 673 | End: 2024-06-07
Payer: COMMERCIAL

## 2024-06-07 ENCOUNTER — APPOINTMENT (INPATIENT)
Dept: DIALYSIS | Facility: HOSPITAL | Age: 44
DRG: 673 | End: 2024-06-07
Payer: COMMERCIAL

## 2024-06-07 LAB
ANION GAP SERPL CALCULATED.3IONS-SCNC: 13 MMOL/L (ref 4–13)
BUN SERPL-MCNC: 91 MG/DL (ref 5–25)
CALCIUM SERPL-MCNC: 7.2 MG/DL (ref 8.4–10.2)
CHLORIDE SERPL-SCNC: 100 MMOL/L (ref 96–108)
CO2 SERPL-SCNC: 20 MMOL/L (ref 21–32)
CREAT SERPL-MCNC: 9.59 MG/DL (ref 0.6–1.3)
CRP SERPL QL: 306.3 MG/L
ERYTHROCYTE [DISTWIDTH] IN BLOOD BY AUTOMATED COUNT: 15 % (ref 11.6–15.1)
GFR SERPL CREATININE-BSD FRML MDRD: 5 ML/MIN/1.73SQ M
GLUCOSE SERPL-MCNC: 84 MG/DL (ref 65–140)
GLUCOSE SERPL-MCNC: 86 MG/DL (ref 65–140)
GLUCOSE SERPL-MCNC: 87 MG/DL (ref 65–140)
GLUCOSE SERPL-MCNC: 88 MG/DL (ref 65–140)
GLUCOSE SERPL-MCNC: 96 MG/DL (ref 65–140)
HBV CORE AB SER QL: NORMAL
HBV CORE IGM SER QL: NORMAL
HBV SURFACE AB SER-ACNC: <3 MIU/ML
HBV SURFACE AG SER QL: NORMAL
HCT VFR BLD AUTO: 24.5 % (ref 36.5–49.3)
HCV AB SER QL: NORMAL
HGB BLD-MCNC: 7.7 G/DL (ref 12–17)
IGA SERPL-MCNC: 417 MG/DL (ref 66–433)
IGG SERPL-MCNC: 1378 MG/DL (ref 635–1741)
IGM SERPL-MCNC: 23 MG/DL (ref 45–281)
LACTATE SERPL-SCNC: 0.3 MMOL/L (ref 0.5–2)
LDH SERPL-CCNC: 119 U/L (ref 140–271)
MCH RBC QN AUTO: 27.6 PG (ref 26.8–34.3)
MCHC RBC AUTO-ENTMCNC: 31.4 G/DL (ref 31.4–37.4)
MCV RBC AUTO: 88 FL (ref 82–98)
PLATELET # BLD AUTO: 316 THOUSANDS/UL (ref 149–390)
PMV BLD AUTO: 9.1 FL (ref 8.9–12.7)
POTASSIUM SERPL-SCNC: 4.5 MMOL/L (ref 3.5–5.3)
PROCALCITONIN SERPL-MCNC: 2.85 NG/ML
RBC # BLD AUTO: 2.79 MILLION/UL (ref 3.88–5.62)
SODIUM SERPL-SCNC: 133 MMOL/L (ref 135–147)
WBC # BLD AUTO: 14.53 THOUSAND/UL (ref 4.31–10.16)

## 2024-06-07 PROCEDURE — 86704 HEP B CORE ANTIBODY TOTAL: CPT | Performed by: INTERNAL MEDICINE

## 2024-06-07 PROCEDURE — 82784 ASSAY IGA/IGD/IGG/IGM EACH: CPT | Performed by: PHYSICIAN ASSISTANT

## 2024-06-07 PROCEDURE — 83615 LACTATE (LD) (LDH) ENZYME: CPT | Performed by: PHYSICIAN ASSISTANT

## 2024-06-07 PROCEDURE — 87340 HEPATITIS B SURFACE AG IA: CPT | Performed by: INTERNAL MEDICINE

## 2024-06-07 PROCEDURE — 82668 ASSAY OF ERYTHROPOIETIN: CPT | Performed by: PHYSICIAN ASSISTANT

## 2024-06-07 PROCEDURE — 5A1D70Z PERFORMANCE OF URINARY FILTRATION, INTERMITTENT, LESS THAN 6 HOURS PER DAY: ICD-10-PCS | Performed by: INTERNAL MEDICINE

## 2024-06-07 PROCEDURE — 86706 HEP B SURFACE ANTIBODY: CPT | Performed by: INTERNAL MEDICINE

## 2024-06-07 PROCEDURE — 99233 SBSQ HOSP IP/OBS HIGH 50: CPT | Performed by: INTERNAL MEDICINE

## 2024-06-07 PROCEDURE — 87040 BLOOD CULTURE FOR BACTERIA: CPT

## 2024-06-07 PROCEDURE — 99232 SBSQ HOSP IP/OBS MODERATE 35: CPT

## 2024-06-07 PROCEDURE — 87081 CULTURE SCREEN ONLY: CPT | Performed by: STUDENT IN AN ORGANIZED HEALTH CARE EDUCATION/TRAINING PROGRAM

## 2024-06-07 PROCEDURE — 84145 PROCALCITONIN (PCT): CPT

## 2024-06-07 PROCEDURE — 83605 ASSAY OF LACTIC ACID: CPT

## 2024-06-07 PROCEDURE — 82232 ASSAY OF BETA-2 PROTEIN: CPT | Performed by: PHYSICIAN ASSISTANT

## 2024-06-07 PROCEDURE — 82948 REAGENT STRIP/BLOOD GLUCOSE: CPT

## 2024-06-07 PROCEDURE — 83010 ASSAY OF HAPTOGLOBIN QUANT: CPT | Performed by: PHYSICIAN ASSISTANT

## 2024-06-07 PROCEDURE — 86140 C-REACTIVE PROTEIN: CPT | Performed by: PHYSICIAN ASSISTANT

## 2024-06-07 PROCEDURE — 94760 N-INVAS EAR/PLS OXIMETRY 1: CPT

## 2024-06-07 PROCEDURE — 99232 SBSQ HOSP IP/OBS MODERATE 35: CPT | Performed by: INTERNAL MEDICINE

## 2024-06-07 PROCEDURE — 80048 BASIC METABOLIC PNL TOTAL CA: CPT | Performed by: INTERNAL MEDICINE

## 2024-06-07 PROCEDURE — 86705 HEP B CORE ANTIBODY IGM: CPT | Performed by: INTERNAL MEDICINE

## 2024-06-07 PROCEDURE — 94660 CPAP INITIATION&MGMT: CPT

## 2024-06-07 PROCEDURE — 85027 COMPLETE CBC AUTOMATED: CPT | Performed by: INTERNAL MEDICINE

## 2024-06-07 PROCEDURE — 86803 HEPATITIS C AB TEST: CPT | Performed by: INTERNAL MEDICINE

## 2024-06-07 RX ORDER — CEFAZOLIN SODIUM 2 G/50ML
2000 SOLUTION INTRAVENOUS EVERY 8 HOURS
Status: DISCONTINUED | OUTPATIENT
Start: 2024-06-07 | End: 2024-06-07

## 2024-06-07 RX ORDER — HYDROMORPHONE HCL/PF 1 MG/ML
0.5 SYRINGE (ML) INJECTION ONCE
Status: COMPLETED | OUTPATIENT
Start: 2024-06-07 | End: 2024-06-07

## 2024-06-07 RX ORDER — CEFAZOLIN SODIUM 1 G/50ML
1000 SOLUTION INTRAVENOUS EVERY 12 HOURS
Status: DISCONTINUED | OUTPATIENT
Start: 2024-06-08 | End: 2024-06-09

## 2024-06-07 RX ORDER — ONDANSETRON 2 MG/ML
4 INJECTION INTRAMUSCULAR; INTRAVENOUS EVERY 6 HOURS PRN
Status: DISCONTINUED | OUTPATIENT
Start: 2024-06-07 | End: 2024-06-26 | Stop reason: HOSPADM

## 2024-06-07 RX ADMIN — INSULIN GLARGINE 10 UNITS: 100 INJECTION, SOLUTION SUBCUTANEOUS at 22:52

## 2024-06-07 RX ADMIN — Medication 2.5 MG: at 11:01

## 2024-06-07 RX ADMIN — CARVEDILOL 6.25 MG: 6.25 TABLET, FILM COATED ORAL at 17:15

## 2024-06-07 RX ADMIN — OXYCODONE HYDROCHLORIDE 5 MG: 5 TABLET ORAL at 13:06

## 2024-06-07 RX ADMIN — HEPARIN SODIUM 5000 UNITS: 5000 INJECTION, SOLUTION INTRAVENOUS; SUBCUTANEOUS at 13:07

## 2024-06-07 RX ADMIN — CYANOCOBALAMIN TAB 500 MCG 1000 MCG: 500 TAB at 08:05

## 2024-06-07 RX ADMIN — Medication 5000 UNITS: at 08:10

## 2024-06-07 RX ADMIN — OXYCODONE HYDROCHLORIDE 5 MG: 5 TABLET ORAL at 08:05

## 2024-06-07 RX ADMIN — HYOSCYAMINE SULFATE 1 APPLICATION: 16 SOLUTION at 17:14

## 2024-06-07 RX ADMIN — Medication 2.5 MG: at 05:42

## 2024-06-07 RX ADMIN — HEPARIN SODIUM 5000 UNITS: 5000 INJECTION, SOLUTION INTRAVENOUS; SUBCUTANEOUS at 05:43

## 2024-06-07 RX ADMIN — HYDROMORPHONE HYDROCHLORIDE 0.5 MG: 1 INJECTION, SOLUTION INTRAMUSCULAR; INTRAVENOUS; SUBCUTANEOUS at 16:45

## 2024-06-07 RX ADMIN — ACETAMINOPHEN 650 MG: 325 TABLET ORAL at 11:01

## 2024-06-07 RX ADMIN — ATORVASTATIN CALCIUM 80 MG: 80 TABLET, FILM COATED ORAL at 08:05

## 2024-06-07 RX ADMIN — NYSTATIN 1 APPLICATION: 100000 POWDER TOPICAL at 17:14

## 2024-06-07 RX ADMIN — ONDANSETRON 4 MG: 2 INJECTION INTRAMUSCULAR; INTRAVENOUS at 23:00

## 2024-06-07 RX ADMIN — Medication 0.5 MG/HR: at 01:28

## 2024-06-07 RX ADMIN — VANCOMYCIN HYDROCHLORIDE 2000 MG: 1 INJECTION, POWDER, LYOPHILIZED, FOR SOLUTION INTRAVENOUS at 12:59

## 2024-06-07 RX ADMIN — Medication 2.5 MG: at 22:52

## 2024-06-07 RX ADMIN — HEPARIN SODIUM 5000 UNITS: 5000 INJECTION, SOLUTION INTRAVENOUS; SUBCUTANEOUS at 22:52

## 2024-06-07 RX ADMIN — OXYCODONE HYDROCHLORIDE 5 MG: 5 TABLET ORAL at 01:28

## 2024-06-07 NOTE — PLAN OF CARE
Target UF Goal  0.5  L as tolerated. Patient dialyzing for 2 hours on 3 K bath for serum K of  4.1  per protocol. First day of hemodialysis started with Dr. Timmons at bedside and discussed plan of treatment. Accessed hemodialysis catheter with an order to OK to use.  Problem: METABOLIC, FLUID AND ELECTROLYTES - ADULT  Goal: Electrolytes maintained within normal limits  Description: INTERVENTIONS:  - Monitor labs and assess patient for signs and symptoms of electrolyte imbalances  - Administer electrolyte replacement as ordered  - Monitor response to electrolyte replacements, including repeat lab results as appropriate  - Instruct patient on fluid and nutrition as appropriate  Outcome: Progressing  Goal: Fluid balance maintained  Description: INTERVENTIONS:  - Monitor labs   - Monitor I/O and WT  - Instruct patient on fluid and nutrition as appropriate  - Assess for signs & symptoms of volume excess or deficit  Outcome: Progressing   Post-Dialysis RN Treatment Note    Blood Pressure:  Pre 130/68 mm/Hg  Post 109/67 mmHg   EDW  first hemodialysis kg    Weight:  Pre 185.5 kg   Post 185 kg   Mode of weight measurement: Bed Scale   Volume Removed  500 ml    Treatment duration 120 minutes    NS given  No    Treatment shortened? No   Medications given during Rx None Reported   Estimated Kt/V  None Reported   Access type: Temporary HD catheter   Access Issues: No    Report called to primary nurse   Yes Emma Araya RN  Was Febrile after hemodialysis Temperature of 101 tympanic. Dr. Timmons and Emma RN was aware.

## 2024-06-07 NOTE — PROGRESS NOTES
[facility]  Progress Note  Name: Joaquin Frankel I  MRN: [id number]  Unit/Bed#: 4 [address] Date of Admission: 6/3/2024   Date of Service: 6/7/2024 I Hospital Day: 4    Assessment & Plan   * CLOVIS (acute kidney injury) (HCC)  Assessment & Plan  POA with creatinine of 6.40.    Creatinine: 8.01> 9.59  Unclear etiology of CLOVIS.  Likely multifactorial.    No evidence of hydronephrosis or obstruction on CT imaging.  Serum and urine immunofixation showing IgG kappa monoclonal band  Transition to IV Bumex drip 0.5mg/hr on 6/6/24  Plan for renal biopsy next Wednesday by IR. Hold aspirin until biopsy  Due to worsening creatinine and uremia, will place for HD tomorrow 6/7/24. IR consulted for HD catheter placement.  Nephrology following, input appreciated  Consult oncology  Monitor BMP  6/7: Patient received first dialysis today    Volume overload  Assessment & Plan  Patient with evidence of volume overload on admission as evidenced by increased edema of the left lower extremity which already has chronic lymphedema.  He also has noticed increased abdominal girth and swelling in his pannus.  ECHO: LVEF 58%, diastolic function is mildly abnormal, consistent with grade 1 relaxation, bilateral atrial dilation, mitral valve with mild regurgitation, tricuspid valve with mild regurgitation, PA pressure 58 mmHg, there is mild pulmonic valve regurgitation  Continue lewis catheter for accurate I's and O's given body habitus  Both nephrology and cardiology are following for volume management  Continue IV Bumex infusion  Initiation of HD on 6/7/24  Patient on a renally restrictive diet.  Monitor daily weights.    Cellulitis of left lower extremity  Assessment & Plan  With a history of lymphedema .  On examination today, LLE noted to be warm, red and swollen and patient with fever of 101.  WBC elevated at 14.53  Procalcitonin of 2.85.  Repeat in a.m.    Blood cultures x 2 pending  Lactic acid  negative  Start vancomycin and cefazolin 1 g every 8 hrs  Pharmacy consult, appreciate input  Monitor fever curve and hemodynamics  CBC in a.m.      Wound of abdomen  Assessment & Plan  Found by wound care nurse that patient has a left lower quadrant abdominal wound with tracking and purulent drainage for which she recommended general surgery consultation  Appreciate surgery input  No acute surgical intervention at this time  Continue local wound care per wound care nurse  Follow-up in the outpatient wound center    Acute respiratory failure with hypoxia (HCC)  Assessment & Plan  Patient also with evidence of acute hypoxic respiratory failure.    Likely multifactorial including volume overload, suspected underlying obstructive sleep apnea and obesity hypoventilation syndrome.    No evidence of pulmonary emboli on diagnostic studies.  Patient states he has an upcoming appointment with a sleep specialist and ultimately will undergo a sleep study as an outpatient.  He has been told he has overnight hypoxia. Overnight pulse ox study qualifies patient for CPAP. Will place order.     Chronic acquired lymphedema  Assessment & Plan  Patient developed LLE lymphedema after left lower extremity surgical procedure several years ago.  Patient complaining of increasing pain in the left lower extremity likely secondary to volume overload.  Left lower extremity venous Doppler: Pending  Patient would benefit from ambulatory referral to lymphedema clinic.    Anemia of chronic disease  Assessment & Plan  Patient has anemia of chronic disease likely secondary to underlying nephropathy.  However hemoglobin is lower today than baseline.  He did have some episodes of hematuria.  Robbins catheter is being inserted therefore will monitor for future episodes of hematuria.  B12 310, add supplementation  Iron panel shows iron deficiency. IV venofer 200mg every other day x 5 doses per Nephrology  Continue to monitor hemoglobin closely and  transfuse for hemoglobin less than 7.0 or drastic drop in hemoglobin.    Chest pain  Assessment & Plan  POA  VQ scan low suspicion for PE  Echo with normal EF and grade 1 diastolic dysfunction  Cardiology input appreciated  ASA on hold for renal biopsy   Denies any further chest pain complaints today    Morbid obesity (HCC)  Assessment & Plan  Patient counseled on the importance of exercise and following a healthy diet.    Diabetes (HCC)  Assessment & Plan  Lab Results   Component Value Date    HGBA1C 6.6 (H) 04/29/2024     Recent Labs     06/06/24  1615 06/06/24  2113 06/07/24  0717 06/07/24  1114   POCGLU 94 151* 86 87       Blood Sugar Average: Last 72 hrs:  (P) [id number]    Amaryl held during his hospitalization.  Resume at discharge.    Continue Lantus 10 units at bedtime along with sliding scale insulin.    Continue diabetic diet.      Hypertension  Assessment & Plan  Blood pressures are stable and acceptable on amlodipine 10 mg daily and carvedilol 6.25 mg twice a day.    Patient has now been switched to IV Bumex.  Follow blood pressures closely while being diuresed.    Coronary artery disease  Assessment & Plan  Patient with a history of coronary artery disease with multivessel disease status post stents.    Continue with aspirin, high-dose statin and beta-blocker.  Cardiology following and appreciate their input.               VTE Pharmacologic Prophylaxis: VTE Score: 7 High Risk (Score >/= 5) - Pharmacological DVT Prophylaxis Ordered: heparin. Sequential Compression Devices Ordered.    Mobility:   Basic Mobility Inpatient Raw Score: 23  JH-HLM Goal: 7: Walk 25 feet or more  JH-HLM Achieved: 5: Stand (1 or more minutes)  JH-HLM Goal achieved. Continue to encourage appropriate mobility.    Patient Centered Rounds: I performed bedside rounds with nursing staff today.   Discussions with Specialists or Other Care Team Provider: Neprology, cardiology, RN, CM    Education and Discussions with Family /  Patient: Patient declined call to .     Total Time Spent on Date of Encounter in care of patient:  mins. This time was spent on one or more of the following: performing physical exam; counseling and coordination of care; obtaining or reviewing history; documenting in the medical record; reviewing/ordering tests, medications or procedures; communicating with other healthcare professionals and discussing with patient's family/caregivers.    Current Length of Stay: 4 day(s)  Current Patient Status: Inpatient   Certification Statement: The patient will continue to require additional inpatient hospital stay due to clinical course  Discharge Plan: Anticipate discharge in 48 hrs to home.    Code Status: Level 1 - Full Code    Subjective:   Seen and examined at bedside.  Reports pain on the left side down to the left lower extremity.  Discussed with patient the abnormal findings on lab works and fever which will require sepsis workup.  Denies chest pain, dizziness, lightheadedness, abdominal pain, nausea or vomiting.      Objective:     Vitals:   Temp (24hrs), Av.8 °F (37.1 °C), Min:98 °F (36.7 °C), Max:101 °F (38.3 °C)    Temp:  [98 °F (36.7 °C)-101 °F (38.3 °C)] 98 °F (36.7 °C)  HR:  [69-93] 81  Resp:  [16-20] 20  BP: ()/(44-68) 124/48  SpO2:  [89 %-94 %] 91 %  Body mass index is 64.22 kg/m².     Input and Output Summary (last 24 hours):     Intake/Output Summary (Last 24 hours) at 2024 1639  Last data filed at 2024 1041  Gross per 24 hour   Intake 1370 ml   Output 1655 ml   Net -285 ml       Physical Exam:   Physical Exam  Constitutional:       Appearance: He is obese.   HENT:      Head: Normocephalic.   Cardiovascular:      Rate and Rhythm: Normal rate.      Pulses: Normal pulses.   Pulmonary:      Effort: Pulmonary effort is normal.   Abdominal:      General: Bowel sounds are normal.   Musculoskeletal:         General: Tenderness present.      Right lower leg: Edema present.      Left  lower leg: Swelling present. Edema present.      Comments: Lymphedema/cellulitis   Skin:     General: Skin is warm.      Capillary Refill: Capillary refill takes less than 2 seconds.      Findings: Erythema present.      Comments: Left lower extremity   Neurological:      Mental Status: He is alert and oriented to person, place, and time.   Psychiatric:         Behavior: Behavior normal.          Additional Data:     Labs:  Results from last 7 days   Lab Units 06/07/24  0846 06/06/24  0312 06/05/24  0619   WBC Thousand/uL 14.53*   < > 9.15   HEMOGLOBIN g/dL 7.7*   < > 8.5*   HEMATOCRIT % 24.5*   < > 27.0*   PLATELETS Thousands/uL 316   < > 317   SEGS PCT %  --   --  80*   LYMPHO PCT %  --   --  9*   MONO PCT %  --   --  8   EOS PCT %  --   --  3    < > = values in this interval not displayed.     Results from last 7 days   Lab Units 06/07/24  0846 06/04/24  0821 06/03/24  0714   SODIUM mmol/L 133*   < > 138   POTASSIUM mmol/L 4.5   < > 3.8   CHLORIDE mmol/L 100   < > 104   CO2 mmol/L 20*   < > 21   BUN mg/dL 91*   < > 62*   CREATININE mg/dL 9.59*   < > 6.40*   ANION GAP mmol/L 13   < > 13   CALCIUM mg/dL 7.2*   < > 8.1*   ALBUMIN g/dL  --   --  3.0*   TOTAL BILIRUBIN mg/dL  --   --  0.35   ALK PHOS U/L  --   --  72   ALT U/L  --   --  9   AST U/L  --   --  13   GLUCOSE RANDOM mg/dL 88   < > 116    < > = values in this interval not displayed.     Results from last 7 days   Lab Units 06/03/24  0758   INR  1.10     Results from last 7 days   Lab Units 06/07/24  1114 06/07/24  0717 06/06/24  2113 06/06/24  1615 06/06/24  1113 06/06/24  0737 06/05/24  2051 06/05/24  1552 06/05/24  1131 06/05/24  0744 06/04/24 2011 06/04/24  1602   POC GLUCOSE mg/dl 87 86 151* 94 104 83 114 106 92 63* 122 114         Results from last 7 days   Lab Units 06/07/24  1215   LACTIC ACID mmol/L 0.3*   PROCALCITONIN ng/ml 2.85*       Lines/Drains:  Invasive Devices       Peripheral Intravenous Line  Duration             Peripheral IV 06/03/24  Right Antecubital 4 days    Peripheral IV 06/04/24 Dorsal (posterior);Left Hand 3 days              Hemodialysis Catheter  Duration             HD Temporary Double Catheter 1 day              Drain  Duration             Urethral Catheter Non-latex 16 Fr. 3 days                  Urinary Catheter:  Goal for removal: Voiding trial when ambulation improves           Telemetry:  Telemetry Orders (From admission, onward)               Temporary telemetry monitoring  (Hemodialysis Initiation Treatment)  Continuous x 24 Hours (Telem)        Question:  Indication  Answer:  During dialysis for arrythmia monitoring                     Telemetry Reviewed: Normal Sinus Rhythm  Indication for Continued Telemetry Use: Arrthymias requiring medical therapy             Imaging: No pertinent imaging reviewed.    Recent Cultures (last 7 days):         Last 24 Hours Medication List:   Current Facility-Administered Medications   Medication Dose Route Frequency Provider Last Rate    acetaminophen  650 mg Oral Q6H PRN Les Batista MD      amLODIPine  10 mg Oral Daily Les Batista MD      atorvastatin  80 mg Oral Daily Les Batista MD      carvedilol  6.25 mg Oral BID With Meals Les Batista MD      cefazolin  2,000 mg Intravenous Q8H Olayide D PETRA Duarte      Cholecalciferol  5,000 Units Oral Daily Bandar Timmons MD      cyanocobalamin  1,000 mcg Oral Daily PETRA Cleary      heparin (porcine)  5,000 Units Subcutaneous Q8H [facility] Les Batista MD      HYDROmorphone  0.5 mg Intravenous Once Olayide D PETRA Duarte      insulin glargine  10 Units Subcutaneous HS PETRA Cleary      insulin lispro  1-6 Units Subcutaneous HS Les Batitsa MD      insulin lispro  2-12 Units Subcutaneous TID AC Les Batista MD      iron sucrose  200 mg Intravenous Every Other Day Bandar Timmons MD      nystatin  1 Application Topical BID Alia Anaya  PETRA Randall      oxyCODONE  5 mg Oral Q2H PRN Max 8/day PETRA Cleary      oxyCODONE  2.5 mg Oral Q4H PRN PETRA Cleary      sodium hypochlorite  1 Application Irrigation Daily Les Batista MD      [START ON 6/8/2024] vancomycin  10 mg/kg (Adjusted) Intravenous After Dialysis PETRA Vásquez          Today, Patient Was Seen By: PETRA Vásquez    **Please Note: This note may have been constructed using a voice recognition system.**

## 2024-06-07 NOTE — ASSESSMENT & PLAN NOTE
With a history of lymphedema .  On examination today, LLE noted to be warm, red and swollen and patient with fever of 101.  WBC elevated at 14.53  Procalcitonin of 2.85.  Repeat in a.m.    Blood cultures x 2 pending  Lactic acid negative  Start vancomycin and cefazolin 1 g every 8 hrs  Pharmacy consult, appreciate input  Monitor fever curve and hemodynamics  CBC in a.m.

## 2024-06-07 NOTE — PROGRESS NOTES
NEPHROLOGY HOSPITAL PROGRESS NOTE   Joaquin Frankel 44 y.o. male MRN: [id number]  Unit/Bed#: [address] [place] [address] Encounter: [id number]  Reason for Consult: CLOVIS    ASSESSMENT and PLAN:  Joaquin Frankel is a 44 y.o. male who was admitted to [facility] after presenting with shortness of breath and chest pain. A renal consultation is requested today for assistance in the management of CLOVIS.     CLOVIS.  Etiology of CLOVIS rule out MGRS +/-? cardiorenal syndrome on baseline of diabetic nephropathy +/- autoregulatory failure in setting of RAAS blockade +/- heavy NSAID use +/- rule out glomerulonephritis.  Baseline creatinine appears to be around 1.2-1.6.  Creatinine was rising as outpatient with most recent creatinine of 2.98 on 04/29/2024.  Creatinine on admission 6.40.  Creatinine yesterday 8.10.  Creatinine today pending.  Urinalysis showing 10-20 RBC, 2-4 WBC.  No hydronephrosis on CT abdomen without contrast on admission.   Cardiac BNP elevated at 233 but can be falsely low in setting of morbid obesity.  Chest x-ray with signs of pulmonary vascular congestion.  CT chest with mosaic attenuation.  Serum and urine immunofixation showing IgG kappa monoclonal band.  KL ratio within normal limits.  No hypocomplementemia.  JOSE ROBERTO/dsDNA negative.  ANCA/Anti-GBM negative.   Kidney biopsy has been ordered for further evaluation, this cannot be performed before early next week due to aspirin use.  Given worsening renal function and signs of uremia, we will initiate hemodialysis today.  Temporary dialysis catheter placed by IR on 06/06.    2.  Chronic kidney disease.  Most likely diabetic nephropathy.  Baseline creatinine appears to be around 1.2-1.6.  UCR 5200 mg/g 04/2024.     3.  Hypertension.  Home medications include amlodipine 10 mg daily, Coreg 6.25 mg twice daily, lisinopril 20 mg daily.  Blood pressure is currently acceptable on current dose of Coreg and amlodipine.  Keep holding lisinopril in setting of CLOVIS.     4.   Anemia in CKD.  Hemoglobin 8.0 yesterday.  Ferritin 69, iron saturation 17 consistent with absolute iron deficiency.  Started on IV Venofer 200 mg every other day for 5 doses.  Hematology has evaluated the patient for anemia in setting of positive IgG kappa monoclonal band.    5.  Chest pain on admission.  VQ scan with low suspicion for PE.  IV heparin has been discontinued.  Echocardiogram with normal EF and grade 1 diastolic dysfunction.  Discussed with cardiology and aspirin held for anticipation of kidney biopsy.    6.  Secondary hyperparathyroidism.  .  Etiology most likely due to vitamin D deficiency +/- chronic kidney disease.  25-hydroxy vitamin D level less than 7.0.  Continue cholecalciferol 5000 units daily.  Hold off initiation of calcitriol as patient has hyperphosphatemia.    7.  Hyponatremia.  Serum sodium level was 133 yesterday.  He also received a dose of DDAVP due to bleeding around hemodialysis catheter site due to possibility of uremic platelets.  Monitor serum sodium level.  Continue fluid restriction.    8.  Metabolic acidosis.  Serum bicarbonate level was 20 yesterday.  Monitor with diuresis.  This will be corrected with dialysis.    Discussed with internal medicine team.  After discussion, we agreed that patient has significant CLOVIS and warrants initiation of hemodialysis and full session will be performed today.    SUBJECTIVE / 24H INTERVAL HISTORY:  Urine output recorded as 800 cc.  Complains of fatigue.  Complains of leg pain.  Appetite is low.    OBJECTIVE:  Current Weight: Weight - Scale: (!) 186 kg (410 lb)  Vitals:    06/06/24 2205 06/06/24 2305 06/07/24 0349 06/07/24 0600   BP: 109/56  130/68    BP Location:       Pulse: 69  79    Resp:   16    Temp: 98 °F (36.7 °C)      TempSrc:       SpO2: 91% 91% 94%    Weight:    (!) 186 kg (410 lb)   Height:           Intake/Output Summary (Last 24 hours) at 6/7/2024 0655  Last data filed at 6/6/2024 1726  Gross per 24 hour   Intake 850 ml    Output 800 ml   Net 50 ml     Review of Systems   Constitutional:  Positive for appetite change and fatigue. Negative for chills and fever.   HENT:  Negative for ear pain and sore throat.    Eyes:  Negative for pain and visual disturbance.   Respiratory:  Negative for cough and shortness of breath.    Cardiovascular:  Positive for leg swelling. Negative for chest pain and palpitations.   Gastrointestinal:  Negative for abdominal pain and vomiting.   Genitourinary:  Negative for dysuria and hematuria.   Musculoskeletal:  Negative for arthralgias and back pain.   Skin:  Negative for color change and rash.   Neurological:  Negative for seizures and syncope.   All other systems reviewed and are negative.    Physical Exam  Vitals and nursing note reviewed.   Constitutional:       General: He is not in acute distress.     Appearance: He is well-developed.   HENT:      Head: Normocephalic and atraumatic.   Eyes:      Conjunctiva/sclera: Conjunctivae normal.   Cardiovascular:      Rate and Rhythm: Normal rate and regular rhythm.      Pulses: Normal pulses.      Heart sounds: Normal heart sounds. No murmur heard.  Pulmonary:      Effort: Pulmonary effort is normal. No respiratory distress.      Comments: Decreased breath sounds bilaterally at bases  Abdominal:      Palpations: Abdomen is soft.      Tenderness: There is no abdominal tenderness.   Musculoskeletal:         General: No swelling.      Cervical back: Neck supple.      Right lower leg: Edema present.      Left lower leg: Edema present.      Comments: Left lower extremity lymphedema present   Skin:     General: Skin is warm and dry.      Capillary Refill: Capillary refill takes less than 2 seconds.   Neurological:      Mental Status: He is alert.      Comments: Asterixis present   Psychiatric:         Mood and Affect: Mood normal.       Medications:    Current Facility-Administered Medications:     acetaminophen (TYLENOL) tablet 650 mg, 650 mg, Oral, Q6H PRN,  Les Batista MD, 650 mg at 06/05/24 1144    amLODIPine (NORVASC) tablet 10 mg, 10 mg, Oral, Daily, Les Batista MD, 10 mg at 06/06/24 0818    atorvastatin (LIPITOR) tablet 80 mg, 80 mg, Oral, Daily, Les Batista MD, 80 mg at 06/06/24 0818    bumetanide (BUMEX) 12.5 mg infusion 50 mL, 0.5 mg/hr, Intravenous, Continuous, Bandar Timmons MD, Last Rate: 2 mL/hr at 06/07/24 0128, 0.5 mg/hr at 06/07/24 0128    carvedilol (COREG) tablet 6.25 mg, 6.25 mg, Oral, BID With Meals, Les Batista MD, 6.25 mg at 06/06/24 1711    Cholecalciferol (VITAMIN D3) tablet 5,000 Units, 5,000 Units, Oral, Daily, Bandar Timmons MD, 5,000 Units at 06/06/24 0818    cyanocobalamin (VITAMIN B-12) tablet 1,000 mcg, 1,000 mcg, Oral, Daily, PETRA Cleary, 1,000 mcg at 06/06/24 0818    heparin (porcine) subcutaneous injection 5,000 Units, 5,000 Units, Subcutaneous, Q8H SHAZIA, Les Batista MD, 5,000 Units at 06/07/24 0543    HYDROmorphone HCl (DILAUDID) injection 0.2 mg, 0.2 mg, Intravenous, Once, Criss Gutiérrez PA-C    insulin glargine (LANTUS) subcutaneous injection 10 Units 0.1 mL, 10 Units, Subcutaneous, HS, PETRA Cleary, 10 Units at 06/06/24 2150    insulin lispro (HumALOG/ADMELOG) 100 units/mL subcutaneous injection 1-6 Units, 1-6 Units, Subcutaneous, HS, Les Batista MD, 1 Units at 06/06/24 2150    insulin lispro (HumALOG/ADMELOG) 100 units/mL subcutaneous injection 2-12 Units, 2-12 Units, Subcutaneous, TID AC **AND** Fingerstick Glucose (POCT), , , TID AC, Les Batista MD    iron sucrose (VENOFER) 200 mg in sodium chloride 0.9 % 100 mL IVPB, 200 mg, Intravenous, Every Other Day, Bandar Timmons MD, 200 mg at 06/06/24 1151    nystatin (MYCOSTATIN) powder 1 Application, 1 Application, Topical, BID, PETRA Cleary, 1 Application at 06/06/24 0845    oxyCODONE (ROXICODONE) IR tablet 5 mg, 5 mg, Oral, Q2H PRN Max 8/day, Alia  "PETRA Pineda, 5 mg at 06/07/24 0128    oxyCODONE (ROXICODONE) split tablet 2.5 mg, 2.5 mg, Oral, Q4H PRN, Alia Anaya PETRA Randall, 2.5 mg at 06/07/24 0542    sodium hypochlorite (DAKIN'S HALF-STRENGTH) 0.25 percent topical solution 1 Application, 1 Application, Irrigation, Daily, Les Batista MD    Laboratory Results:  Results from last 7 days   Lab Units 06/06/24  0312 06/05/24  0619 06/04/24  0836 06/04/24  0821 06/03/24  0714   WBC Thousand/uL 9.66 9.15 8.60  --  9.91   HEMOGLOBIN g/dL 8.0* 8.5* 8.0*  --  9.0*   HEMATOCRIT % 25.0* 27.0* 25.0*  --  27.7*   PLATELETS Thousands/uL 306 317 319  --  327   POTASSIUM mmol/L 4.1 4.2  --  4.2 3.8   CHLORIDE mmol/L 101 103  --  104 104   CO2 mmol/L 20* 24  --  21 21   BUN mg/dL 73* 71*  --  64* 62*   CREATININE mg/dL 8.10* 7.28*  --  6.87* 6.40*   CALCIUM mg/dL 7.6* 7.8*  --  7.9* 8.1*       Portions of the record may have been created with voice recognition software. Occasional wrong word or \"sound a like\" substitutions may have occurred due to the inherent limitations of voice recognition software. Read the chart carefully and recognize, using context, where substitutions have occurred. If you have any questions, please contact the dictating provider.    "

## 2024-06-07 NOTE — ASSESSMENT & PLAN NOTE
Lab Results   Component Value Date    HGBA1C 6.6 (H) 04/29/2024     Recent Labs     06/06/24  1615 06/06/24  2113 06/07/24  0717 06/07/24  1114   POCGLU 94 151* 86 87       Blood Sugar Average: Last 72 hrs:  (P) [id number]    Amaryl held during his hospitalization.  Resume at discharge.    Continue Lantus 10 units at bedtime along with sliding scale insulin.    Continue diabetic diet.

## 2024-06-07 NOTE — PROGRESS NOTES
Joaquin Frankel is a 44 y.o. male who is currently ordered Vancomycin IV with management by the Pharmacy Consult service.  Relevant clinical data and objective / subjective history reviewed.  Vancomycin Assessment:  Indication and Goal AUC/Trough: Soft tissue (goal -600, trough >10), -600, trough >10  Clinical Status: New  Micro:   Pending  Renal Function:  SCr: 9.59 mg/dL  CrCl: 16 mL/min  Renal replacement: HD  Days of Therapy: 1  Current Dose: 2000 mg IV once  Vancomycin Plan:  New Dosin mg IV after dialysis prn T < 15 (pending specified day/s)  Next Level: 0600 on 24  Renal Function Monitoring: Daily BMP and UOP  Pharmacy will continue to follow closely for s/sx of nephrotoxicity, infusion reactions and appropriateness of therapy.  BMP and CBC will be ordered per protocol. We will continue to follow the patient’s culture results and clinical progress daily.    Gilda Loredo, Pharmacist

## 2024-06-07 NOTE — ASSESSMENT & PLAN NOTE
POA with creatinine of 6.40.    Creatinine: 8.01> 9.59  Unclear etiology of CLOVIS.  Likely multifactorial.    No evidence of hydronephrosis or obstruction on CT imaging.  Serum and urine immunofixation showing IgG kappa monoclonal band  Transition to IV Bumex drip 0.5mg/hr on 6/6/24  Plan for renal biopsy next Wednesday by IR. Hold aspirin until biopsy  Due to worsening creatinine and uremia, will place for HD tomorrow 6/7/24. IR consulted for HD catheter placement.  Nephrology following, input appreciated  Consult oncology  Monitor [facility]  6/7: Patient received first dialysis today

## 2024-06-07 NOTE — PLAN OF CARE
Problem: Prexisting or High Potential for Compromised Skin Integrity  Goal: Skin integrity is maintained or improved  Description: INTERVENTIONS:  - Identify patients at risk for skin breakdown  - Assess and monitor skin integrity  - Assess and monitor nutrition and hydration status  - Monitor labs   - Assess for incontinence   - Turn and reposition patient  - Assist with mobility/ambulation  - Relieve pressure over bony prominences  - Avoid friction and shearing  - Provide appropriate hygiene as needed including keeping skin clean and dry  - Evaluate need for skin moisturizer/barrier cream  - Collaborate with interdisciplinary team   - Patient/family teaching  - Consider wound care consult   6/7/2024 0229 by Cesia Vera RN  Outcome: Progressing  6/7/2024 0227 by Cesia Vera RN  Outcome: Progressing     Problem: PAIN - ADULT  Goal: Verbalizes/displays adequate comfort level or baseline comfort level  Description: Interventions:  - Encourage patient to monitor pain and request assistance  - Assess pain using appropriate pain scale  - Administer analgesics based on type and severity of pain and evaluate response  - Implement non-pharmacological measures as appropriate and evaluate response  - Consider cultural and social influences on pain and pain management  - Notify physician/advanced practitioner if interventions unsuccessful or patient reports new pain  6/7/2024 0229 by Cesia Vera RN  Outcome: Progressing  6/7/2024 0227 by Cesia Vera RN  Outcome: Progressing     Problem: INFECTION - ADULT  Goal: Absence or prevention of progression during hospitalization  Description: INTERVENTIONS:  - Assess and monitor for signs and symptoms of infection  - Monitor lab/diagnostic results  - Monitor all insertion sites, i.e. indwelling lines, tubes, and drains  - [place] appropriate cooling/warming therapies per order  - Administer medications as ordered  - Instruct and encourage patient and  family to use good hand hygiene technique  - Identify and instruct in appropriate isolation precautions for identified infection/condition  6/7/2024 0229 by Cesia Vera RN  Outcome: Progressing  6/7/2024 0227 by Cesia Vera RN  Outcome: Progressing  Goal: Absence of fever/infection during neutropenic period  Description: INTERVENTIONS:  - Monitor WBC    6/7/2024 0229 by Cesia Vera RN  Outcome: Progressing  6/7/2024 0227 by Cesia Vera RN  Outcome: Progressing     Problem: SAFETY ADULT  Goal: Patient will remain free of falls  Description: INTERVENTIONS:  - Educate patient/family on patient safety including physical limitations  - Instruct patient to call for assistance with activity   - Consult OT/PT to assist with strengthening/mobility   - Keep Call bell within reach  - Keep bed low and locked with side rails adjusted as appropriate  - Keep care items and personal belongings within reach  - Initiate and maintain comfort rounds  - Make Fall Risk Sign visible to staff  - Apply yellow socks and bracelet for high fall risk patients  - Consider moving patient to room near nurses station  6/7/2024 0229 by Cesia Vera RN  Outcome: Progressing  6/7/2024 0227 by Cesia Vera RN  Outcome: Progressing  Goal: Maintain or return to baseline ADL function  Description: INTERVENTIONS:  -  Assess patient's ability to carry out ADLs; assess patient's baseline for ADL function and identify physical deficits which impact ability to perform ADLs (bathing, care of mouth/teeth, toileting, grooming, dressing, etc.)  - Assess/evaluate cause of self-care deficits   - Assess range of motion  - Assess patient's mobility; develop plan if impaired  - Assess patient's need for assistive devices and provide as appropriate  - Encourage maximum independence but intervene and supervise when necessary  - Involve family in performance of ADLs  - Assess for home care needs following discharge   -  Consider OT consult to assist with ADL evaluation and planning for discharge  - Provide patient education as appropriate  6/7/2024 0229 by Cesia Vera RN  Outcome: Progressing  6/7/2024 0227 by Cesia Vera RN  Outcome: Progressing  Goal: Maintains/Returns to pre admission functional level  Description: INTERVENTIONS:  - Perform AM-PAC 6 Click Basic Mobility/ Daily Activity assessment daily.  - Set and communicate daily mobility goal to care team and patient/family/caregiver.   - Collaborate with rehabilitation services on mobility goals if consulted  - Out of bed for toileting  - Record patient progress and toleration of activity level   6/7/2024 0229 by Cesia Vera RN  Outcome: Progressing  6/7/2024 0227 by Cesia Vera RN  Outcome: Progressing     Problem: CARDIOVASCULAR - ADULT  Goal: Maintains optimal cardiac output and hemodynamic stability  Description: INTERVENTIONS:  - Monitor I/O, vital signs and rhythm  - Monitor for S/S and trends of decreased cardiac output  - Administer and titrate ordered vasoactive medications to optimize hemodynamic stability  - Assess quality of pulses, skin color and temperature  - Assess for signs of decreased coronary artery perfusion  - Instruct patient to report change in severity of symptoms  6/7/2024 0229 by Cesia Vera RN  Outcome: Progressing  6/7/2024 0227 by Cesia Vera RN  Outcome: Progressing  Goal: Absence of cardiac dysrhythmias or at baseline rhythm  Description: INTERVENTIONS:  - Continuous cardiac monitoring, vital signs, obtain 12 lead EKG if ordered  - Administer antiarrhythmic and heart rate control medications as ordered  - Monitor electrolytes and administer replacement therapy as ordered  6/7/2024 0229 by Cesia Vera RN  Outcome: Progressing  6/7/2024 0227 by Cesia Vera RN  Outcome: Progressing     Problem: RESPIRATORY - ADULT  Goal: Achieves optimal ventilation and oxygenation  Description:  INTERVENTIONS:  - Assess for changes in respiratory status  - Assess for changes in mentation and behavior  - Position to facilitate oxygenation and minimize respiratory effort  - Oxygen administered by appropriate delivery if ordered  - Initiate smoking cessation education as indicated  - Encourage broncho-pulmonary hygiene including cough, deep breathe, Incentive Spirometry  - Assess the need for suctioning and aspirate as needed  - Assess and instruct to report SOB or any respiratory difficulty  - Respiratory Therapy support as indicated  6/7/2024 0229 by Cesia Vera RN  Outcome: Progressing  6/7/2024 0227 by Cesia Vera RN  Outcome: Progressing     Problem: SKIN/TISSUE INTEGRITY - ADULT  Goal: Incision(s), wounds(s) or drain site(s) healing without S/S of infection  Description: INTERVENTIONS  - Assess and document dressing, incision, wound bed, drain sites and surrounding tissue  - Provide patient and family education  - Perform skin care/dressing changes  6/7/2024 0229 by Cesia Vera RN  Outcome: Progressing  6/7/2024 0227 by Cesia Vera RN  Outcome: Progressing  Goal: Skin Integrity remains intact(Skin Breakdown Prevention)  Description: Assess:  -Perform Juan assessment every shift.  -Clean and moisturize skin every shift.  -Inspect skin when repositioning, toileting, and assisting with ADLS  -Assess extremities for adequate circulation and sensation     Bed Management:  -Have minimal linens on bed & keep smooth, unwrinkled  -Change linens as needed when moist or perspiring  -Avoid sitting or lying in one position for more than 2 hours while in bed  -Keep HOB at 45 degrees     Toileting:  -Offer bedside commode  -Assess for incontinence every shift.   -Use incontinent care products after each incontinent episode such as protective barrier.    Activity:  -Mobilize patient 3 times a day  -Encourage activity and walks on unit  -Encourage or provide ROM exercises   -Turn and  reposition patient every 2 Hours  -Use appropriate equipment to lift or move patient in bed  -Instruct/ Assist with weight shifting every 2 hours  when out of bed in chair  -Consider limitation of chair time 2 hour intervals    Skin Care:  -Avoid use of baby powder, tape, friction and shearing, hot water or constrictive clothing  -Relieve pressure over bony prominences using pillows  -Do not massage red bony areas    Next Steps:  -Teach patient strategies to minimize risks such as off loading.    -Consider consults to  interdisciplinary teams such as wound care team, nutrition team.   Outcome: Progressing     Problem: NEUROSENSORY - ADULT  Goal: Achieves maximal functionality and self care  Description: INTERVENTIONS  - Monitor swallowing and airway patency with patient fatigue and changes in neurological status  - Encourage and assist patient to increase activity and self care.   - Encourage visually impaired, hearing impaired and aphasic patients to use assistive/communication devices  Outcome: Progressing     Problem: METABOLIC, FLUID AND ELECTROLYTES - ADULT  Goal: Electrolytes maintained within normal limits  Description: INTERVENTIONS:  - Monitor labs and assess patient for signs and symptoms of electrolyte imbalances  - Administer electrolyte replacement as ordered  - Monitor response to electrolyte replacements, including repeat lab results as appropriate  - Instruct patient on fluid and nutrition as appropriate  Outcome: Progressing  Goal: Fluid balance maintained  Description: INTERVENTIONS:  - Monitor labs   - Monitor I/O and WT  - Instruct patient on fluid and nutrition as appropriate  - Assess for signs & symptoms of volume excess or deficit  Outcome: Progressing  Goal: Glucose maintained within target range  Description: INTERVENTIONS:  - Monitor Blood Glucose as ordered  - Assess for signs and symptoms of hyperglycemia and hypoglycemia  - Administer ordered medications to maintain glucose within  target range  - Assess nutritional intake and initiate nutrition service referral as needed  Outcome: Progressing     Problem: MUSCULOSKELETAL - ADULT  Goal: Maintain or return mobility to safest level of function  Description: INTERVENTIONS:  - Assess patient's ability to carry out ADLs; assess patient's baseline for ADL function and identify physical deficits which impact ability to perform ADLs (bathing, care of mouth/teeth, toileting, grooming, dressing, etc.)  - Assess/evaluate cause of self-care deficits   - Assess range of motion  - Assess patient's mobility  - Assess patient's need for assistive devices and provide as appropriate  - Encourage maximum independence but intervene and supervise when necessary  - Involve family in performance of ADLs  - Assess for home care needs following discharge   - Consider OT consult to assist with ADL evaluation and planning for discharge  - Provide patient education as appropriate  Outcome: Progressing  Goal: Maintain proper alignment of affected body part  Description: INTERVENTIONS:  - Support, maintain and protect limb and body alignment  - Provide patient/ family with appropriate education  Outcome: Progressing

## 2024-06-07 NOTE — PROGRESS NOTES
Cardiology Progress Note  [facility]     Joaquin Frankel 44 y.o. male MRN: [id number]  : 1980  Unit/Bed#: [address] [place] [address] Encounter: [id number]        ASSESSMENT:  Admitted on this occasion for progressive RICHARDSON and exertional chest pain. Presenting EKG showed no acute diagnostic ischemic changes; hs troponin negative x3.   Acute renal failure.  Possibly due to diabetic nephropathy, recent use of NSAIDs, home ACE inhibitor, and cardiorenal syndrome with diastolic dysfunction.  S/p right IJ nontunneled temporary dialysis catheter 2024.  Echo 24: LVEF 58% with grade I LVDD and normal wall motion. RV pressure is moderately elevated at 58 mmHg.   Elevated D dimer.  VQ scan showed low probability of PE.  CT chest and abdomen without contrast 2024 report: Slight mosaic attenuation of the lungs.  No other acute pulmonary pathology.  See full report in Epic.  Hypertensive urgency on arrival.   CAD. NSTEMI in 2020 s/p PCI to RCA then. Hx of previous PCI to LCX in .   Morbid obesity with chronic lymphedema in the LLE.   Anemia of chronic disease.  Hx of L groin cellulitis and mass with skin grafting.  PMHx includes HLD, DM II, hx of tobacco use (reported stopping 3 years ago).     PLAN AND RECOMMENDATIONS:  Underwent placement of a temporary nontunneled dialysis catheter via the right IJ yesterday.  Receiving first dialysis session this morning.  Continue volume management with above and Bumex infusion.  Continue amlodipine 10 mg daily, carvedilol 6.25 mg twice daily, and atorvastatin 80 mg daily.  ASA being held for planned renal biopsy next week.          Interval history: Receiving first hemodialysis session today.  He is lying comfortably in bed.  Reporting having pain in his left leg which has been ongoing since he arrived.        Meds/Allergies   current meds:   Current Facility-Administered Medications   Medication Dose Route Frequency    acetaminophen (TYLENOL) tablet  "650 mg  650 mg Oral Q6H PRN    amLODIPine (NORVASC) tablet 10 mg  10 mg Oral Daily    atorvastatin (LIPITOR) tablet 80 mg  80 mg Oral Daily    bumetanide (BUMEX) 12.5 mg infusion 50 mL  0.5 mg/hr Intravenous Continuous    carvedilol (COREG) tablet 6.25 mg  6.25 mg Oral BID With Meals    Cholecalciferol (VITAMIN D3) tablet 5,000 Units  5,000 Units Oral Daily    cyanocobalamin (VITAMIN B-12) tablet 1,000 mcg  1,000 mcg Oral Daily    heparin (porcine) subcutaneous injection 5,000 Units  5,000 Units Subcutaneous Q8H SHAZIA    HYDROmorphone HCl (DILAUDID) injection 0.2 mg  0.2 mg Intravenous Once    insulin glargine (LANTUS) subcutaneous injection 10 Units 0.1 mL  10 Units Subcutaneous HS    insulin lispro (HumALOG/ADMELOG) 100 units/mL subcutaneous injection 1-6 Units  1-6 Units Subcutaneous HS    insulin lispro (HumALOG/ADMELOG) 100 units/mL subcutaneous injection 2-12 Units  2-12 Units Subcutaneous TID AC    iron sucrose (VENOFER) 200 mg in sodium chloride 0.9 % 100 mL IVPB  200 mg Intravenous Every Other Day    nystatin (MYCOSTATIN) powder 1 Application  1 Application Topical BID    oxyCODONE (ROXICODONE) IR tablet 5 mg  5 mg Oral Q2H PRN Max 8/day    oxyCODONE (ROXICODONE) split tablet 2.5 mg  2.5 mg Oral Q4H PRN    sodium hypochlorite (DAKIN'S HALF-STRENGTH) 0.25 percent topical solution 1 Application  1 Application Irrigation Daily    [START ON 6/8/2024] vancomycin (VANCOCIN) 1,250 mg in sodium chloride 0.9 % 250 mL IVPB  10 mg/kg (Adjusted) Intravenous After Dialysis    vancomycin (VANCOCIN) 2,000 mg in sodium chloride 0.9 % 500 mL IVPB  2,000 mg Intravenous Once     Allergies   Allergen Reactions    Keflex [Cephalexin] Facial Swelling and Lip Swelling    Amoxicillin Hives     childhood       Objective   Vitals: Blood pressure (!) 105/49, pulse 79, temperature 98 °F (36.7 °C), resp. rate 20, height 5' 7\" (1.702 m), weight (!) 186 kg (410 lb), SpO2 91%.,       Intake/Output Summary (Last 24 hours) at 6/7/2024 " 1416  Last data filed at 6/7/2024 1041  Gross per 24 hour   Intake 1370 ml   Output 2455 ml   Net -1085 ml       Physical Exam:  General: pleasant, comfortable, in no acute distress  Neurologic: speech is coherent, awake and alert  Cardiovascular: regular S1 and S2, no murmurs  Pulmonary: lungs clear to auscultation bilaterally, no rales or wheezes  Abdomen: soft, nontender  Extremities: LLE with massive edema due to lymphedema.  RLE with 1-2+ pitting edema.      Lab Results:   Troponins:   Results from last 7 days   Lab Units 06/03/24  1240   CK TOTAL U/L 267       Recent Results (from the past 24 hour(s))   Fingerstick Glucose (POCT)    Collection Time: 06/06/24  4:15 PM   Result Value Ref Range    POC Glucose 94 65 - 140 mg/dl   Fingerstick Glucose (POCT)    Collection Time: 06/06/24  9:13 PM   Result Value Ref Range    POC Glucose 151 (H) 65 - 140 mg/dl   Fingerstick Glucose (POCT)    Collection Time: 06/07/24  7:17 AM   Result Value Ref Range    POC Glucose 86 65 - 140 mg/dl   LD,Blood    Collection Time: 06/07/24  8:46 AM   Result Value Ref Range     (L) 140 - 271 U/L   C-reactive protein    Collection Time: 06/07/24  8:46 AM   Result Value Ref Range    .3 (H) <3.0 mg/L   Basic metabolic panel    Collection Time: 06/07/24  8:46 AM   Result Value Ref Range    Sodium 133 (L) 135 - 147 mmol/L    Potassium 4.5 3.5 - 5.3 mmol/L    Chloride 100 96 - 108 mmol/L    CO2 20 (L) 21 - 32 mmol/L    ANION GAP 13 4 - 13 mmol/L    BUN 91 (H) 5 - 25 mg/dL    Creatinine 9.59 (H) 0.60 - 1.30 mg/dL    Glucose 88 65 - 140 mg/dL    Calcium 7.2 (L) 8.4 - 10.2 mg/dL    eGFR 5 ml/min/1.73sq m   CBC    Collection Time: 06/07/24  8:46 AM   Result Value Ref Range    WBC 14.53 (H) 4.31 - 10.16 Thousand/uL    RBC 2.79 (L) 3.88 - 5.62 Million/uL    Hemoglobin 7.7 (L) 12.0 - 17.0 g/dL    Hematocrit 24.5 (L) 36.5 - 49.3 %    MCV 88 82 - 98 fL    MCH 27.6 26.8 - 34.3 pg    MCHC 31.4 31.4 - 37.4 g/dL    RDW 15.0 11.6 - 15.1 %     Platelets 316 149 - 390 Thousands/uL    MPV 9.1 8.9 - 12.7 fL   Fingerstick Glucose (POCT)    Collection Time: 06/07/24 11:14 AM   Result Value Ref Range    POC Glucose 87 65 - 140 mg/dl   Lactic acid, plasma (w/reflex if result > 2.0)    Collection Time: 06/07/24 12:15 PM   Result Value Ref Range    LACTIC ACID 0.3 (L) 0.5 - 2.0 mmol/L   Procalcitonin    Collection Time: 06/07/24 12:15 PM   Result Value Ref Range    Procalcitonin 2.85 (H) <=0.25 ng/ml          Cardiac testing:   Relevant testing reviewed - see above  Telemetry: SR Jose De Jesus Cardozo PA-C  6/7/2024  2:19 PM     This note was completed in part utilizing direct voice dictation software.  Grammatical errors, random word insertions, spelling mistakes, and incomplete sentences may be an occasional consequence of the system secondary to software limitations, ambient noise and hardware issues. Please read the chart carefully and recognize, using context, where substitutions have occurred.

## 2024-06-08 ENCOUNTER — APPOINTMENT (INPATIENT)
Dept: DIALYSIS | Facility: HOSPITAL | Age: 44
DRG: 673 | End: 2024-06-08
Attending: INTERNAL MEDICINE
Payer: COMMERCIAL

## 2024-06-08 ENCOUNTER — APPOINTMENT (INPATIENT)
Dept: RADIOLOGY | Facility: HOSPITAL | Age: 44
DRG: 673 | End: 2024-06-08
Payer: COMMERCIAL

## 2024-06-08 LAB
ANION GAP SERPL CALCULATED.3IONS-SCNC: 16 MMOL/L (ref 4–13)
BUN SERPL-MCNC: 77 MG/DL (ref 5–25)
CALCIUM SERPL-MCNC: 7.2 MG/DL (ref 8.4–10.2)
CHLORIDE SERPL-SCNC: 98 MMOL/L (ref 96–108)
CO2 SERPL-SCNC: 18 MMOL/L (ref 21–32)
CREAT SERPL-MCNC: 9.55 MG/DL (ref 0.6–1.3)
EPO SERPL-ACNC: 26 MIU/ML (ref 2.6–18.5)
ERYTHROCYTE [DISTWIDTH] IN BLOOD BY AUTOMATED COUNT: 15.2 % (ref 11.6–15.1)
GFR SERPL CREATININE-BSD FRML MDRD: 5 ML/MIN/1.73SQ M
GLUCOSE SERPL-MCNC: 106 MG/DL (ref 65–140)
GLUCOSE SERPL-MCNC: 116 MG/DL (ref 65–140)
GLUCOSE SERPL-MCNC: 118 MG/DL (ref 65–140)
GLUCOSE SERPL-MCNC: 120 MG/DL (ref 65–140)
GLUCOSE SERPL-MCNC: 121 MG/DL (ref 65–140)
GLUCOSE SERPL-MCNC: 152 MG/DL (ref 65–140)
HAPTOGLOB SERPL-MCNC: 434 MG/DL (ref 23–355)
HCT VFR BLD AUTO: 26 % (ref 36.5–49.3)
HGB BLD-MCNC: 8 G/DL (ref 12–17)
MAGNESIUM SERPL-MCNC: 1.7 MG/DL (ref 1.9–2.7)
MCH RBC QN AUTO: 27.6 PG (ref 26.8–34.3)
MCHC RBC AUTO-ENTMCNC: 30.8 G/DL (ref 31.4–37.4)
MCV RBC AUTO: 90 FL (ref 82–98)
MRSA NOSE QL CULT: NORMAL
PLATELET # BLD AUTO: 309 THOUSANDS/UL (ref 149–390)
PMV BLD AUTO: 9.1 FL (ref 8.9–12.7)
POTASSIUM SERPL-SCNC: 4.9 MMOL/L (ref 3.5–5.3)
PROCALCITONIN SERPL-MCNC: 7.47 NG/ML
RBC # BLD AUTO: 2.9 MILLION/UL (ref 3.88–5.62)
SODIUM SERPL-SCNC: 132 MMOL/L (ref 135–147)
WBC # BLD AUTO: 14.64 THOUSAND/UL (ref 4.31–10.16)

## 2024-06-08 PROCEDURE — 5A1D70Z PERFORMANCE OF URINARY FILTRATION, INTERMITTENT, LESS THAN 6 HOURS PER DAY: ICD-10-PCS | Performed by: INTERNAL MEDICINE

## 2024-06-08 PROCEDURE — 71045 X-RAY EXAM CHEST 1 VIEW: CPT

## 2024-06-08 PROCEDURE — 80048 BASIC METABOLIC PNL TOTAL CA: CPT | Performed by: INTERNAL MEDICINE

## 2024-06-08 PROCEDURE — 94760 N-INVAS EAR/PLS OXIMETRY 1: CPT

## 2024-06-08 PROCEDURE — 83880 ASSAY OF NATRIURETIC PEPTIDE: CPT | Performed by: NURSE PRACTITIONER

## 2024-06-08 PROCEDURE — 85027 COMPLETE CBC AUTOMATED: CPT | Performed by: INTERNAL MEDICINE

## 2024-06-08 PROCEDURE — 99232 SBSQ HOSP IP/OBS MODERATE 35: CPT

## 2024-06-08 PROCEDURE — 82948 REAGENT STRIP/BLOOD GLUCOSE: CPT

## 2024-06-08 PROCEDURE — 90935 HEMODIALYSIS ONE EVALUATION: CPT | Performed by: INTERNAL MEDICINE

## 2024-06-08 PROCEDURE — 83735 ASSAY OF MAGNESIUM: CPT

## 2024-06-08 PROCEDURE — 84145 PROCALCITONIN (PCT): CPT

## 2024-06-08 RX ORDER — OXYCODONE HYDROCHLORIDE 5 MG/1
5 TABLET ORAL EVERY 4 HOURS PRN
Status: DISCONTINUED | OUTPATIENT
Start: 2024-06-08 | End: 2024-06-11

## 2024-06-08 RX ORDER — ALBUMIN (HUMAN) 12.5 G/50ML
12.5 SOLUTION INTRAVENOUS ONCE
Status: COMPLETED | OUTPATIENT
Start: 2024-06-08 | End: 2024-06-09

## 2024-06-08 RX ORDER — MIDODRINE HYDROCHLORIDE 5 MG/1
10 TABLET ORAL ONCE
Status: COMPLETED | OUTPATIENT
Start: 2024-06-08 | End: 2024-06-08

## 2024-06-08 RX ORDER — CARVEDILOL 3.12 MG/1
3.12 TABLET ORAL 2 TIMES DAILY WITH MEALS
Status: DISCONTINUED | OUTPATIENT
Start: 2024-06-08 | End: 2024-06-09

## 2024-06-08 RX ORDER — HYDROMORPHONE HCL/PF 1 MG/ML
0.5 SYRINGE (ML) INJECTION
Status: DISCONTINUED | OUTPATIENT
Start: 2024-06-08 | End: 2024-06-10

## 2024-06-08 RX ADMIN — Medication 5000 UNITS: at 10:38

## 2024-06-08 RX ADMIN — NYSTATIN 1 APPLICATION: 100000 POWDER TOPICAL at 17:21

## 2024-06-08 RX ADMIN — OXYCODONE HYDROCHLORIDE 5 MG: 5 TABLET ORAL at 16:25

## 2024-06-08 RX ADMIN — HEPARIN SODIUM 5000 UNITS: 5000 INJECTION, SOLUTION INTRAVENOUS; SUBCUTANEOUS at 14:20

## 2024-06-08 RX ADMIN — CYANOCOBALAMIN TAB 500 MCG 1000 MCG: 500 TAB at 10:38

## 2024-06-08 RX ADMIN — INSULIN LISPRO 2 UNITS: 100 INJECTION, SOLUTION INTRAVENOUS; SUBCUTANEOUS at 17:20

## 2024-06-08 RX ADMIN — CARVEDILOL 3.12 MG: 3.12 TABLET, FILM COATED ORAL at 16:24

## 2024-06-08 RX ADMIN — OXYCODONE HYDROCHLORIDE 5 MG: 5 TABLET ORAL at 02:07

## 2024-06-08 RX ADMIN — HEPARIN SODIUM 5000 UNITS: 5000 INJECTION, SOLUTION INTRAVENOUS; SUBCUTANEOUS at 05:47

## 2024-06-08 RX ADMIN — ATORVASTATIN CALCIUM 80 MG: 80 TABLET, FILM COATED ORAL at 10:38

## 2024-06-08 RX ADMIN — CARVEDILOL 3.12 MG: 3.12 TABLET, FILM COATED ORAL at 11:53

## 2024-06-08 RX ADMIN — NYSTATIN 1 APPLICATION: 100000 POWDER TOPICAL at 10:48

## 2024-06-08 RX ADMIN — CEFAZOLIN SODIUM 1000 MG: 1 SOLUTION INTRAVENOUS at 16:24

## 2024-06-08 RX ADMIN — INSULIN GLARGINE 10 UNITS: 100 INJECTION, SOLUTION SUBCUTANEOUS at 22:44

## 2024-06-08 RX ADMIN — HYDROMORPHONE HYDROCHLORIDE 0.5 MG: 1 INJECTION, SOLUTION INTRAMUSCULAR; INTRAVENOUS; SUBCUTANEOUS at 14:19

## 2024-06-08 RX ADMIN — VANCOMYCIN HYDROCHLORIDE 1250 MG: 10 INJECTION, POWDER, LYOPHILIZED, FOR SOLUTION INTRAVENOUS at 17:22

## 2024-06-08 RX ADMIN — OXYCODONE HYDROCHLORIDE 5 MG: 5 TABLET ORAL at 11:54

## 2024-06-08 RX ADMIN — HEPARIN SODIUM 5000 UNITS: 5000 INJECTION, SOLUTION INTRAVENOUS; SUBCUTANEOUS at 22:44

## 2024-06-08 RX ADMIN — IRON SUCROSE 200 MG: 20 INJECTION, SOLUTION INTRAVENOUS at 10:51

## 2024-06-08 RX ADMIN — HYDROMORPHONE HYDROCHLORIDE 0.5 MG: 1 INJECTION, SOLUTION INTRAMUSCULAR; INTRAVENOUS; SUBCUTANEOUS at 18:08

## 2024-06-08 RX ADMIN — CEFAZOLIN SODIUM 1000 MG: 1 SOLUTION INTRAVENOUS at 05:48

## 2024-06-08 RX ADMIN — MIDODRINE HYDROCHLORIDE 10 MG: 5 TABLET ORAL at 10:37

## 2024-06-08 RX ADMIN — HYOSCYAMINE SULFATE 1 APPLICATION: 16 SOLUTION at 10:48

## 2024-06-08 NOTE — PROGRESS NOTES
[facility]  Progress Note  Name: Joaquin Frankel I  MRN: [id number]  Unit/Bed#: 4 [address] Date of Admission: 6/3/2024   Date of Service: 6/8/2024 I Hospital Day: 5    Assessment & Plan   * CLOVIS (acute kidney injury) (HCC)  Assessment & Plan  POA with creatinine of 6.40.    Creatinine: 8.01> 9.59  Unclear etiology of CLOVIS.  Likely multifactorial.    No evidence of hydronephrosis or obstruction on CT imaging.  Serum and urine immunofixation showing IgG kappa monoclonal band  Transition to IV Bumex drip 0.5mg/hr on 6/6/24  Plan for renal biopsy next Wednesday by IR. Hold aspirin until biopsy  Due to worsening creatinine and uremia, will place for HD tomorrow 6/7/24. IR consulted for HD catheter placement.  Nephrology following, input appreciated  Consult oncology  Monitor BMP  6/7: Patient received first dialysis today  6/8: Dialyzed today    Volume overload  Assessment & Plan  Patient with evidence of volume overload on admission as evidenced by increased edema of the left lower extremity which already has chronic lymphedema.  He also has noticed increased abdominal girth and swelling in his pannus.  ECHO: LVEF 58%, diastolic function is mildly abnormal, consistent with grade 1 relaxation, bilateral atrial dilation, mitral valve with mild regurgitation, tricuspid valve with mild regurgitation, PA pressure 58 mmHg, there is mild pulmonic valve regurgitation  Continue lewis catheter for accurate I's and O's given body habitus  Both nephrology and cardiology are following for volume management  Continue IV Bumex infusion  Initiation of HD on 6/7/24  Patient on a renally restrictive diet.  Monitor daily weights.    Cellulitis of left lower extremity  Assessment & Plan  With a history of lymphedema .  On examination today, LLE noted to be warm, red and swollen and patient with fever of 101.  WBC elevated at 14.53 > 14.64  Procalcitonin of 2.85 > 7.47.  Repeat in a.m.    Blood cultures  x 2 pending  Lactic acid negative  Start vancomycin and cefazolin 1 g every 8 hrs  Pharmacy consult, appreciate input  Monitor fever curve and hemodynamics  CBC in a.m.  Follow-up on chest x-ray      Wound of abdomen  Assessment & Plan  Found by wound care nurse that patient has a left lower quadrant abdominal wound with tracking and purulent drainage for which she recommended general surgery consultation  Appreciate surgery input  No acute surgical intervention at this time  Continue local wound care per wound care nurse  Follow-up in the outpatient wound center    Acute respiratory failure with hypoxia (HCC)  Assessment & Plan  Patient also with evidence of acute hypoxic respiratory failure.    Likely multifactorial including volume overload, suspected underlying obstructive sleep apnea and obesity hypoventilation syndrome.    No evidence of pulmonary emboli on diagnostic studies.  Patient states he has an upcoming appointment with a sleep specialist and ultimately will undergo a sleep study as an outpatient.  He has been told he has overnight hypoxia.   Overnight pulse ox study qualifies patient for CPAP.      Chronic acquired lymphedema  Assessment & Plan  Patient developed LLE lymphedema after left lower extremity surgical procedure several years ago.  Patient complaining of increasing pain in the left lower extremity likely secondary to volume overload.  Left lower extremity venous Doppler: Pending  Patient would benefit from ambulatory referral to lymphedema clinic.    Anemia of chronic disease  Assessment & Plan  Patient has anemia of chronic disease likely secondary to underlying nephropathy.  However hemoglobin is lower today than baseline.  He did have some episodes of hematuria.  Robbins catheter is being inserted therefore will monitor for future episodes of hematuria.  B12 310, add supplementation  Iron panel shows iron deficiency. IV venofer 200mg every other day x 5 doses per Nephrology  Continue to  monitor hemoglobin closely and transfuse for hemoglobin less than 7.0 or drastic drop in hemoglobin.    Chest pain  Assessment & Plan  POA  VQ scan low suspicion for PE  Echo with normal EF and grade 1 diastolic dysfunction  Cardiology input appreciated  ASA on hold for renal biopsy   Denies any further chest pain complaints today    Morbid obesity (HCC)  Assessment & Plan  Patient counseled on the importance of exercise and following a healthy diet.    Diabetes (HCC)  Assessment & Plan  Lab Results   Component Value Date    HGBA1C 6.6 (H) 04/29/2024     Recent Labs     06/07/24  2049 06/08/24  0108 06/08/24  0735 06/08/24  1108   POCGLU 84 120 116 106       Blood Sugar Average: Last 72 hrs:  (P) [id number]    Amaryl held during his hospitalization.  Resume at discharge.    Continue Lantus 10 units at bedtime along with sliding scale insulin.    Continue diabetic diet.      Hypertension  Assessment & Plan  Blood pressures are stable and acceptable on amlodipine 10 mg daily and carvedilol 6.25 mg twice a day.    Patient has now been switched to IV Bumex.  Follow blood pressures closely while being diuresed.    Coronary artery disease  Assessment & Plan  Patient with a history of coronary artery disease with multivessel disease status post stents.    Continue with aspirin, high-dose statin and beta-blocker.  Cardiology following and appreciate their input.         VTE Pharmacologic Prophylaxis: VTE Score: 7 High Risk (Score >/= 5) - Pharmacological DVT Prophylaxis Ordered: heparin. Sequential Compression Devices Ordered.     Mobility:   Basic Mobility Inpatient Raw Score: 23  JH-HLM Goal: 7: Walk 25 feet or more  JH-HLM Achieved: 5: Stand (1 or more minutes)  JH-HLM Goal achieved. Continue to encourage appropriate mobility.     Patient Centered Rounds: I performed bedside rounds with nursing staff today.   Discussions with Specialists or Other Care Team Provider: Neprology, RN, CM     Education and  Discussions with Family / Patient: Patient declined call to .      Total Time Spent on Date of Encounter in care of patient:  mins. This time was spent on one or more of the following: performing physical exam; counseling and coordination of care; obtaining or reviewing history; documenting in the medical record; reviewing/ordering tests, medications or procedures; communicating with other healthcare professionals and discussing with patient's family/caregivers.     Current Length of Stay: 5 day(s)  Current Patient Status: Inpatient   Certification Statement: The patient will continue to require additional inpatient hospital stay due to clinical course  Discharge Plan: Anticipate discharge in 48 - 72 hrs to home.     Code Status: Level 1 - Full Code        Subjective:   Patient seen and examined at bedside.  Reports chills and noted shaking.  Provided warm blanket and supportive care.  Patient reports ongoing back pain and no resolution with pain regimen.  Will adjust pain medication not needed and continue to monitor.    Objective:     Vitals:   Temp (24hrs), Av.6 °F (36.4 °C), Min:97 °F (36.1 °C), Max:98 °F (36.7 °C)    Temp:  [97 °F (36.1 °C)-98 °F (36.7 °C)] 97 °F (36.1 °C)  HR:  [64-81] 78  Resp:  [19-20] 20  BP: ()/(44-70) 119/57  SpO2:  [84 %-91 %] 84 %  Body mass index is 64.57 kg/m².     Input and Output Summary (last 24 hours):     Intake/Output Summary (Last 24 hours) at 2024 1420  Last data filed at 2024 1120  Gross per 24 hour   Intake 740 ml   Output 1200 ml   Net -460 ml       Physical Exam:   Physical Exam  Constitutional:       Appearance: He is obese.   HENT:      Head: Normocephalic.   Cardiovascular:      Rate and Rhythm: Normal rate.      Pulses: Normal pulses.      Comments: Right IJ nontunneled hemodialysis catheter in use for hemodialysis  Pulmonary:      Effort: Pulmonary effort is normal.   Abdominal:      General: Bowel sounds are normal.   Musculoskeletal:          General: Tenderness present.      Right lower leg: Edema present.      Left lower leg: Swelling present. Edema present.      Comments: Lymphedema/cellulitis   Skin:     General: Skin is warm.      Capillary Refill: Capillary refill takes less than 2 seconds.      Findings: Erythema present.      Comments: Left lower extremity   Neurological:      Mental Status: He is alert and oriented to person, place, and time.   Psychiatric:         Behavior: Behavior normal.    Additional Data:     Labs:  Results from last 7 days   Lab Units 06/08/24  0558 06/06/24  0312 06/05/24  0619   WBC Thousand/uL 14.64*   < > 9.15   HEMOGLOBIN g/dL 8.0*   < > 8.5*   HEMATOCRIT % 26.0*   < > 27.0*   PLATELETS Thousands/uL 309   < > 317   SEGS PCT %  --   --  80*   LYMPHO PCT %  --   --  9*   MONO PCT %  --   --  8   EOS PCT %  --   --  3    < > = values in this interval not displayed.     Results from last 7 days   Lab Units 06/08/24  0558 06/04/24  0821 06/03/24  0714   SODIUM mmol/L 132*   < > 138   POTASSIUM mmol/L 4.9   < > 3.8   CHLORIDE mmol/L 98   < > 104   CO2 mmol/L 18*   < > 21   BUN mg/dL 77*   < > 62*   CREATININE mg/dL 9.55*   < > 6.40*   ANION GAP mmol/L 16*   < > 13   CALCIUM mg/dL 7.2*   < > 8.1*   ALBUMIN g/dL  --   --  3.0*   TOTAL BILIRUBIN mg/dL  --   --  0.35   ALK PHOS U/L  --   --  72   ALT U/L  --   --  9   AST U/L  --   --  13   GLUCOSE RANDOM mg/dL 121   < > 116    < > = values in this interval not displayed.     Results from last 7 days   Lab Units 06/03/24  0758   INR  1.10     Results from last 7 days   Lab Units 06/08/24  1108 06/08/24  0735 06/08/24  0108 06/07/24  2049 06/07/24  1656 06/07/24  1114 06/07/24  0717 06/06/24  2113 06/06/24  1615 06/06/24  1113 06/06/24  0737 06/05/24  2051   POC GLUCOSE mg/dl 106 116 120 84 96 87 86 151* 94 104 83 114         Results from last 7 days   Lab Units 06/08/24  0558 06/07/24  1215   LACTIC ACID mmol/L  --  0.3*   PROCALCITONIN ng/ml 7.47* 2.85*        Lines/Drains:  Invasive Devices       Peripheral Intravenous Line  Duration             Peripheral IV 24 Right Antecubital 5 days    Peripheral IV 24 Dorsal (posterior);Left Hand 4 days              Hemodialysis Catheter  Duration             HD Temporary Double Catheter 1 day              Drain  Duration             Urethral Catheter Non-latex 16 Fr. 4 days                  Urinary Catheter:  Goal for removal: Voiding trial when ambulation improves           Telemetry:  Telemetry Orders (From admission, onward)               Temporary telemetry monitoring  (Hemodialysis Initiation Treatment)  Continuous x 24 Hours (Telem)           Question:  Indication  Answer:  During dialysis for arrythmia monitoring                     Telemetry Reviewed: Normal Sinus Rhythm  Indication for Continued Telemetry Use: Arrthymias requiring medical therapy             Imaging: No pertinent imaging reviewed.    Recent Cultures (last 7 days):   Results from last 7 days   Lab Units 24  1230 24  1200   BLOOD CULTURE  Received in Microbiology Lab. Culture in Progress. Received in Microbiology Lab. Culture in Progress.       Last 24 Hours Medication List:   Current Facility-Administered Medications   Medication Dose Route Frequency Provider Last Rate    acetaminophen  650 mg Oral Q6H PRN Les Batista MD      atorvastatin  80 mg Oral Daily Les Batista MD      carvedilol  3.125 mg Oral BID With Meals Bandar Timmons MD      cefazolin  1,000 mg Intravenous Q12H PETRA Vásquez 1,000 mg (24 0548)    Cholecalciferol  5,000 Units Oral Daily Bandar Timmons MD      cyanocobalamin  1,000 mcg Oral Daily PETRA Cleary      heparin (porcine)  5,000 Units Subcutaneous Q8H [facility] Les Batista MD      HYDROmorphone  0.5 mg Intravenous Q3H PRN PETRA Vásquez      insulin glargine  10 Units Subcutaneous HS PETRA Cleary      insulin  lispro  1-6 Units Subcutaneous HS Les Batista MD      insulin lispro  2-12 Units Subcutaneous TID AC Les Batista MD      iron sucrose  200 mg Intravenous Every Other Day Bandar Timmons MD      nystatin  1 Application Topical BID PETRA Cleary      ondansetron  4 mg Intravenous Q6H PRN PETRA Nolasco      oxyCODONE  5 mg Oral Q4H PRN PETRA Vásquez      sodium hypochlorite  1 Application Irrigation Daily Les Batista MD      vancomycin  10 mg/kg (Adjusted) Intravenous Once PRN PETRA Vásquez      vancomycin  10 mg/kg (Adjusted) Intravenous Once PETRA Vásquez          Today, Patient Was Seen By: PETRA Vásquez    **Please Note: This note may have been constructed using a voice recognition system.**

## 2024-06-08 NOTE — PROGRESS NOTES
NEPHROLOGY HOSPITAL PROGRESS NOTE   Joaquin Frankel 44 y.o. male MRN: [id number]  Unit/Bed#: [address] [place] [address] Encounter: [id number]  Reason for Consult: CLOVIS    ASSESSMENT and PLAN:  Joaquin Frankel is a 44 y.o. male who was admitted to [facility] after presenting with shortness of breath and chest pain. A renal consultation is requested today for assistance in the management of CLOVIS.     CLOVIS.  Etiology of CLOVIS rule out MGRS +/-? cardiorenal syndrome on baseline of diabetic nephropathy +/- autoregulatory failure in setting of RAAS blockade +/- heavy NSAID use +/- rule out glomerulonephritis.  Baseline creatinine appears to be around 1.2-1.6.  Creatinine was rising as outpatient with most recent creatinine of 2.98 on 04/29/2024.  Creatinine on admission 6.40.  Peak creatinine 9.59 on 06/07/2024.  Status post initiation of hemodialysis on 06/07.  Urinalysis showing 10-20 RBC, 2-4 WBC.  No hydronephrosis on CT abdomen without contrast on admission.   Cardiac BNP elevated at 233 but can be falsely low in setting of morbid obesity.  Chest x-ray with signs of pulmonary vascular congestion.  CT chest with mosaic attenuation.  Serum and urine immunofixation showing IgG kappa monoclonal band.  KL ratio within normal limits.  No hypocomplementemia.  JOSE ROBERTO/dsDNA negative.  ANCA/Anti-GBM negative.   Kidney biopsy has been ordered for further evaluation, this cannot be performed before early next week due to aspirin use.  Second session of hemodialysis today.  Continue monitor for renal recovery.    Patient was seen and examined on hemodialysis session this morning.  We are using blood flow of 250 mL/min and dialysate flow of 1.5 times blood flow.  We will ultrafilter 500 cc on hemodialysis today.  We are using 2K bath.    2.  Chronic kidney disease.  Most likely diabetic nephropathy.  Baseline creatinine appears to be around 1.2-1.6.  UACR 5200 mg/g 04/2024.     3.  Hypertension.  Home medications include amlodipine  10 mg daily, Coreg 6.25 mg twice daily, lisinopril 20 mg daily.  Blood pressure is currently low and will decrease dose of Coreg to 3.125 mg twice daily.  Amlodipine has been discontinued.  Keep holding lisinopril in setting of CLOVIS.     4.  Anemia in CKD.  Hemoglobin 8.0.  Ferritin 69, iron saturation 17 consistent with absolute iron deficiency.  Started on IV Venofer 200 mg every other day for 5 doses.  Hematology has evaluated the patient for anemia in setting of positive IgG kappa monoclonal band.    5.  Chest pain on admission.  VQ scan with low suspicion for PE.  IV heparin has been discontinued.  Echocardiogram with normal EF and grade 1 diastolic dysfunction.  Discussed with cardiology and aspirin held for anticipation of kidney biopsy.    6.  Secondary hyperparathyroidism.  .  Etiology most likely due to vitamin D deficiency +/- chronic kidney disease.  25-hydroxy vitamin D level less than 7.0.  Continue cholecalciferol 5000 units daily.  Hold off initiation of calcitriol as  hyperphosphatemia.    7.  Hyponatremia.  This is due to lack of free water clearance in setting of severe CLOVIS.  Serum sodium level 132.  This will be corrected with hemodialysis.    8.  Metabolic acidosis.  This is due to CLOVIS on CKD.  Serum bicarbonate level 18.  This will be corrected with dialysis.  No need for sodium bicarbonate.    9.  Episode of fever.  He spiked a fever of 101 on 06/07 after full session of hemodialysis.  Blood cultures are negative to date.  Management per primary team.    Discussed with internal medicine team.  After discussion, we agreed to perform second session of hemodialysis today and to monitor for renal recovery.    SUBJECTIVE / 24H INTERVAL HISTORY:  Urine output recorded as 700 cc.  Status post 1 session of hemodialysis yesterday.  At end of dialysis session he spiked a fever of 101.  Continues to complain of pain in his legs.  Denies dyspnea.    OBJECTIVE:  Current Weight: Weight - Scale: (!)  185 kg (407 lb 13.6 oz)  Vitals:    06/08/24 0155 06/08/24 0230 06/08/24 0235 06/08/24 0600   BP: (!) 103/47  (!) 98/45    BP Location:       Pulse:   64    Resp:   19    Temp:   98 °F (36.7 °C)    TempSrc:       SpO2:  (!) 89% (!) 84%    Weight:    (!) 185 kg (407 lb 13.6 oz)   Height:           Intake/Output Summary (Last 24 hours) at 6/8/2024 0647  Last data filed at 6/7/2024 2349  Gross per 24 hour   Intake 740 ml   Output 1705 ml   Net -965 ml     Review of Systems   Constitutional:  Negative for chills and fever.   HENT:  Negative for ear pain and sore throat.    Eyes:  Negative for pain and visual disturbance.   Respiratory:  Negative for cough and shortness of breath.    Cardiovascular:  Negative for chest pain and palpitations.   Gastrointestinal:  Negative for abdominal pain and vomiting.   Genitourinary:  Negative for dysuria and hematuria.   Musculoskeletal:  Negative for arthralgias and back pain.        Generalized aches and pains   Skin:  Negative for color change and rash.   Neurological:  Negative for seizures and syncope.   All other systems reviewed and are negative.    Physical Exam  Vitals and nursing note reviewed.   Constitutional:       General: He is not in acute distress.     Appearance: He is well-developed.   HENT:      Head: Normocephalic and atraumatic.   Eyes:      Conjunctiva/sclera: Conjunctivae normal.   Cardiovascular:      Rate and Rhythm: Normal rate and regular rhythm.      Heart sounds: No murmur heard.     Comments: Right IJ nontunneled hemodialysis catheter in use for hemodialysis  Pulmonary:      Effort: Pulmonary effort is normal. No respiratory distress.      Breath sounds: Normal breath sounds.   Abdominal:      Palpations: Abdomen is soft.      Tenderness: There is no abdominal tenderness.   Musculoskeletal:         General: No swelling.      Cervical back: Neck supple.      Right lower leg: Edema present.      Left lower leg: Edema present.      Comments: Left lower  extremity lymphedema   Skin:     General: Skin is warm and dry.      Capillary Refill: Capillary refill takes less than 2 seconds.   Neurological:      Mental Status: He is alert.   Psychiatric:         Mood and Affect: Mood normal.       Medications:    Current Facility-Administered Medications:     acetaminophen (TYLENOL) tablet 650 mg, 650 mg, Oral, Q6H PRN, Les Batista MD, 650 mg at 06/07/24 1101    amLODIPine (NORVASC) tablet 10 mg, 10 mg, Oral, Daily, Les Batista MD, 10 mg at 06/06/24 0818    atorvastatin (LIPITOR) tablet 80 mg, 80 mg, Oral, Daily, Les Batista MD, 80 mg at 06/07/24 0805    carvedilol (COREG) tablet 6.25 mg, 6.25 mg, Oral, BID With Meals, Les Batista MD, 6.25 mg at 06/07/24 1715    ceFAZolin (ANCEF) IVPB (premix in dextrose) 1,000 mg 50 mL, 1,000 mg, Intravenous, Q12H, PETRA Vásquez, Last Rate: 100 mL/hr at 06/08/24 0548, 1,000 mg at 06/08/24 0548    Cholecalciferol (VITAMIN D3) tablet 5,000 Units, 5,000 Units, Oral, Daily, Bandar Timmons MD, 5,000 Units at 06/07/24 0810    cyanocobalamin (VITAMIN B-12) tablet 1,000 mcg, 1,000 mcg, Oral, Daily, PETRA Cleary, 1,000 mcg at 06/07/24 0805    heparin (porcine) subcutaneous injection 5,000 Units, 5,000 Units, Subcutaneous, Q8H [facility], Les Batista MD, 5,000 Units at 06/08/24 0547    insulin glargine (LANTUS) subcutaneous injection 10 Units 0.1 mL, 10 Units, Subcutaneous, HS, PETRA Cleary, 10 Units at 06/07/24 2252    insulin lispro (HumALOG/ADMELOG) 100 units/mL subcutaneous injection 1-6 Units, 1-6 Units, Subcutaneous, HS, Les Batista MD, 1 Units at 06/06/24 2150    insulin lispro (HumALOG/ADMELOG) 100 units/mL subcutaneous injection 2-12 Units, 2-12 Units, Subcutaneous, TID AC **AND** Fingerstick Glucose (POCT), , , TID AC, Les Batista MD    iron sucrose (VENOFER) 200 mg in sodium chloride 0.9 % 100 mL IVPB, 200 mg, Intravenous,  "Every Other Day, Bandar Timmons MD, 200 mg at 06/06/24 1151    nystatin (MYCOSTATIN) powder 1 Application, 1 Application, Topical, BID, PETRA Cleary, 1 Application at 06/07/24 1714    ondansetron (ZOFRAN) injection 4 mg, 4 mg, Intravenous, Q6H PRN, PETRA Nolasco, 4 mg at 06/07/24 2300    oxyCODONE (ROXICODONE) IR tablet 5 mg, 5 mg, Oral, Q2H PRN Max 8/day, PETRA Cleary, 5 mg at 06/08/24 0207    oxyCODONE (ROXICODONE) split tablet 2.5 mg, 2.5 mg, Oral, Q4H PRN, PETRA Cleary, 2.5 mg at 06/07/24 2252    sodium hypochlorite (DAKIN'S HALF-STRENGTH) 0.25 percent topical solution 1 Application, 1 Application, Irrigation, Daily, Les Batista MD, 1 Application at 06/07/24 1714    vancomycin (VANCOCIN) 1,250 mg in sodium chloride 0.9 % 250 mL IVPB, 10 mg/kg (Adjusted), Intravenous, After Dialysis, PETRA Vásquez    Laboratory Results:  Results from last 7 days   Lab Units 06/08/24  0558 06/07/24  0846 06/06/24  0312 06/05/24  0619 06/04/24  0836 06/04/24  0821 06/03/24  0714   WBC Thousand/uL 14.64* 14.53* 9.66 9.15 8.60  --  9.91   HEMOGLOBIN g/dL 8.0* 7.7* 8.0* 8.5* 8.0*  --  9.0*   HEMATOCRIT % 26.0* 24.5* 25.0* 27.0* 25.0*  --  27.7*   PLATELETS Thousands/uL 309 316 306 317 319  --  327   POTASSIUM mmol/L  --  4.5 4.1 4.2  --  4.2 3.8   CHLORIDE mmol/L  --  100 101 103  --  104 104   CO2 mmol/L  --  20* 20* 24  --  21 21   BUN mg/dL  --  91* 73* 71*  --  64* 62*   CREATININE mg/dL  --  9.59* 8.10* 7.28*  --  6.87* 6.40*   CALCIUM mg/dL  --  7.2* 7.6* 7.8*  --  7.9* 8.1*       Portions of the record may have been created with voice recognition software. Occasional wrong word or \"sound a like\" substitutions may have occurred due to the inherent limitations of voice recognition software. Read the chart carefully and recognize, using context, where substitutions have occurred. If you have any questions, please contact the dictating provider.    "

## 2024-06-08 NOTE — ASSESSMENT & PLAN NOTE
Lab Results   Component Value Date    HGBA1C 6.6 (H) 04/29/2024     Recent Labs     06/07/24  2049 06/08/24  0108 06/08/24  0735 06/08/24  1108   POCGLU 84 120 116 106       Blood Sugar Average: Last 72 hrs:  (P) [id number]    Amaryl held during his hospitalization.  Resume at discharge.    Continue Lantus 10 units at bedtime along with sliding scale insulin.    Continue diabetic diet.

## 2024-06-08 NOTE — ASSESSMENT & PLAN NOTE
With a history of lymphedema .  On examination today, LLE noted to be warm, red and swollen and patient with fever of 101.  WBC elevated at 14.53 > 14.64  Procalcitonin of 2.85 > 7.47.  Repeat in a.m.    Blood cultures x 2 pending  Lactic acid negative  Start vancomycin and cefazolin 1 g every 8 hrs  Pharmacy consult, appreciate input  Monitor fever curve and hemodynamics  CBC in a.m.  Follow-up on chest x-ray

## 2024-06-08 NOTE — ASSESSMENT & PLAN NOTE
Patient also with evidence of acute hypoxic respiratory failure.    Likely multifactorial including volume overload, suspected underlying obstructive sleep apnea and obesity hypoventilation syndrome.    No evidence of pulmonary emboli on diagnostic studies.  Patient states he has an upcoming appointment with a sleep specialist and ultimately will undergo a sleep study as an outpatient.  He has been told he has overnight hypoxia.   Overnight pulse ox study qualifies patient for CPAP.

## 2024-06-08 NOTE — PLAN OF CARE
TX plan reviewed with Dr Timmons and he is agreeable to the following: Goal is to UF 0.5 L on a 2 K+ bath for a morning serum K+ of 4.8, 2 HR and 30 min HDTX. Midodrine 10 mg ordered by attending Dr and given by primary nurse. Primary nurse is aware that pt is requesting pain medication.     Problem: METABOLIC, FLUID AND ELECTROLYTES - ADULT  Goal: Electrolytes maintained within normal limits  Description: INTERVENTIONS:  - Monitor labs and assess patient for signs and symptoms of electrolyte imbalances  - Administer electrolyte replacement as ordered  - Monitor response to electrolyte replacements, including repeat lab results as appropriate  - Instruct patient on fluid and nutrition as appropriate  Outcome: Progressing  Goal: Fluid balance maintained  Description: INTERVENTIONS:  - Monitor labs   - Monitor I/O and WT  - Instruct patient on fluid and nutrition as appropriate  - Assess for signs & symptoms of volume excess or deficit  Outcome: Progressing     Post-Dialysis RN Treatment Note    Blood Pressure:  Pre 106/56 mm/Hg  Post 119/57 mmHg   EDW  TBD kg    Weight:  Pre 187 kg   Post 187 kg   Mode of weight measurement: Bed Scale, pt states he is unable to stand   Volume Removed  0 ml    Treatment duration 150    NS given  No    Treatment shortened? No   Medications given during Rx Venofer 200 mg, Midodrine 10 mg   Estimated Kt/V  None Reported   Access type: Temporary HD catheter   Access Issues: No    Report called to primary nurse   Yes, Tori Martin RN

## 2024-06-08 NOTE — ASSESSMENT & PLAN NOTE
POA with creatinine of 6.40.    Creatinine: 8.01> 9.59  Unclear etiology of CLOVIS.  Likely multifactorial.    No evidence of hydronephrosis or obstruction on CT imaging.  Serum and urine immunofixation showing IgG kappa monoclonal band  Transition to IV Bumex drip 0.5mg/hr on 6/6/24  Plan for renal biopsy next Wednesday by IR. Hold aspirin until biopsy  Due to worsening creatinine and uremia, will place for HD tomorrow 6/7/24. IR consulted for HD catheter placement.  Nephrology following, input appreciated  Consult oncology  Monitor BMP  6/7: Patient received first dialysis today  6/8: Dialyzed today

## 2024-06-08 NOTE — PROGRESS NOTES
Joaquin Frankel is a 44 y.o. male who is currently ordered Vancomycin IV with management by the Pharmacy Consult service.  Relevant clinical data and objective / subjective history reviewed.  Vancomycin Assessment:  Indication and Goal AUC/Trough: Soft tissue (goal -600, trough >10), -600, trough >10  Clinical Status:   Micro:   pending  Renal Function:  SCr: 9.55 mg/dL  CrCl: 16.1 mL/min  Renal replacement: HD  Days of Therapy: 2  Current Dose: 1250 mg IV after dialysis prn T < 15 (pending specified day/s)  Vancomycin Plan:  Patient received HD yesterday and today. After today's HD session, a dose of 1250mg IV once will be given. As there is no HD planned for tomorrow, we will monitor the dose with a pulse dosinmg (10mg/adj kg) IV once as needed for trough </=15. No trough was taken this morning as the blood sample was too small to run. Dialysis is performed already. It seems unnecessary to collect the sample prior to today's dose.   Next Level: 24 @0600  Renal Function Monitoring: Daily BMP and UOP  Pharmacy will continue to follow closely for s/sx of nephrotoxicity, infusion reactions and appropriateness of therapy.  BMP and CBC will be ordered per protocol. We will continue to follow the patient’s culture results and clinical progress daily.    Do Bessie Vu, Pharmacist

## 2024-06-08 NOTE — PLAN OF CARE
Problem: Prexisting or High Potential for Compromised Skin Integrity  Goal: Skin integrity is maintained or improved  Description: INTERVENTIONS:  - Identify patients at risk for skin breakdown  - Assess and monitor skin integrity  - Assess and monitor nutrition and hydration status  - Monitor labs   - Assess for incontinence   - Turn and reposition patient  - Assist with mobility/ambulation  - Relieve pressure over bony prominences  - Avoid friction and shearing  - Provide appropriate hygiene as needed including keeping skin clean and dry  - Evaluate need for skin moisturizer/barrier cream  - Collaborate with interdisciplinary team   - Patient/family teaching  - Consider wound care consult   Outcome: Progressing     Problem: PAIN - ADULT  Goal: Verbalizes/displays adequate comfort level or baseline comfort level  Description: Interventions:  - Encourage patient to monitor pain and request assistance  - Assess pain using appropriate pain scale  - Administer analgesics based on type and severity of pain and evaluate response  - Implement non-pharmacological measures as appropriate and evaluate response  - Consider cultural and social influences on pain and pain management  - Notify physician/advanced practitioner if interventions unsuccessful or patient reports new pain  Outcome: Progressing     Problem: INFECTION - ADULT  Goal: Absence or prevention of progression during hospitalization  Description: INTERVENTIONS:  - Assess and monitor for signs and symptoms of infection  - Monitor lab/diagnostic results  - Monitor all insertion sites, i.e. indwelling lines, tubes, and drains  - [place] appropriate cooling/warming therapies per order  - Administer medications as ordered  - Instruct and encourage patient and family to use good hand hygiene technique  - Identify and instruct in appropriate isolation precautions for identified infection/condition  Outcome: Progressing  Goal: Absence of fever/infection during  neutropenic period  Description: INTERVENTIONS:  - Monitor WBC    Outcome: Progressing     Problem: SAFETY ADULT  Goal: Patient will remain free of falls  Description: INTERVENTIONS:  - Educate patient/family on patient safety including physical limitations  - Instruct patient to call for assistance with activity   - Consult OT/PT to assist with strengthening/mobility   - Keep Call bell within reach  - Keep bed low and locked with side rails adjusted as appropriate  - Keep care items and personal belongings within reach  - Initiate and maintain comfort rounds  - Make Fall Risk Sign visible to staff  - Apply yellow socks and bracelet for high fall risk patients  - Consider moving patient to room near nurses station  Outcome: Progressing  Goal: Maintain or return to baseline ADL function  Description: INTERVENTIONS:  -  Assess patient's ability to carry out ADLs; assess patient's baseline for ADL function and identify physical deficits which impact ability to perform ADLs (bathing, care of mouth/teeth, toileting, grooming, dressing, etc.)  - Assess/evaluate cause of self-care deficits   - Assess range of motion  - Assess patient's mobility; develop plan if impaired  - Assess patient's need for assistive devices and provide as appropriate  - Encourage maximum independence but intervene and supervise when necessary  - Involve family in performance of ADLs  - Assess for home care needs following discharge   - Consider OT consult to assist with ADL evaluation and planning for discharge  - Provide patient education as appropriate  Outcome: Progressing  Goal: Maintains/Returns to pre admission functional level  Description: INTERVENTIONS:  - Perform AM-PAC 6 Click Basic Mobility/ Daily Activity assessment daily.  - Set and communicate daily mobility goal to care team and patient/family/caregiver.   - Collaborate with rehabilitation services on mobility goals if consulted  - Out of bed for toileting  - Record patient  progress and toleration of activity level   Outcome: Progressing     Problem: CARDIOVASCULAR - ADULT  Goal: Maintains optimal cardiac output and hemodynamic stability  Description: INTERVENTIONS:  - Monitor I/O, vital signs and rhythm  - Monitor for S/S and trends of decreased cardiac output  - Administer and titrate ordered vasoactive medications to optimize hemodynamic stability  - Assess quality of pulses, skin color and temperature  - Assess for signs of decreased coronary artery perfusion  - Instruct patient to report change in severity of symptoms  Outcome: Progressing  Goal: Absence of cardiac dysrhythmias or at baseline rhythm  Description: INTERVENTIONS:  - Continuous cardiac monitoring, vital signs, obtain 12 lead EKG if ordered  - Administer antiarrhythmic and heart rate control medications as ordered  - Monitor electrolytes and administer replacement therapy as ordered  Outcome: Progressing     Problem: RESPIRATORY - ADULT  Goal: Achieves optimal ventilation and oxygenation  Description: INTERVENTIONS:  - Assess for changes in respiratory status  - Assess for changes in mentation and behavior  - Position to facilitate oxygenation and minimize respiratory effort  - Oxygen administered by appropriate delivery if ordered  - Initiate smoking cessation education as indicated  - Encourage broncho-pulmonary hygiene including cough, deep breathe, Incentive Spirometry  - Assess the need for suctioning and aspirate as needed  - Assess and instruct to report SOB or any respiratory difficulty  - Respiratory Therapy support as indicated  Outcome: Progressing     Problem: SKIN/TISSUE INTEGRITY - ADULT  Goal: Incision(s), wounds(s) or drain site(s) healing without S/S of infection  Description: INTERVENTIONS  - Assess and document dressing, incision, wound bed, drain sites and surrounding tissue  - Provide patient and family education  - Perform skin care/dressing changes  Outcome: Progressing     Problem: NEUROSENSORY  - ADULT  Goal: Achieves maximal functionality and self care  Description: INTERVENTIONS  - Monitor swallowing and airway patency with patient fatigue and changes in neurological status  - Encourage and assist patient to increase activity and self care.   - Encourage visually impaired, hearing impaired and aphasic patients to use assistive/communication devices  Outcome: Progressing     Problem: METABOLIC, FLUID AND ELECTROLYTES - ADULT  Goal: Electrolytes maintained within normal limits  Description: INTERVENTIONS:  - Monitor labs and assess patient for signs and symptoms of electrolyte imbalances  - Administer electrolyte replacement as ordered  - Monitor response to electrolyte replacements, including repeat lab results as appropriate  - Instruct patient on fluid and nutrition as appropriate  Outcome: Progressing  Goal: Fluid balance maintained  Description: INTERVENTIONS:  - Monitor labs   - Monitor I/O and WT  - Instruct patient on fluid and nutrition as appropriate  - Assess for signs & symptoms of volume excess or deficit  Outcome: Progressing  Goal: Glucose maintained within target range  Description: INTERVENTIONS:  - Monitor Blood Glucose as ordered  - Assess for signs and symptoms of hyperglycemia and hypoglycemia  - Administer ordered medications to maintain glucose within target range  - Assess nutritional intake and initiate nutrition service referral as needed  Outcome: Progressing     Problem: MUSCULOSKELETAL - ADULT  Goal: Maintain or return mobility to safest level of function  Description: INTERVENTIONS:  - Assess patient's ability to carry out ADLs; assess patient's baseline for ADL function and identify physical deficits which impact ability to perform ADLs (bathing, care of mouth/teeth, toileting, grooming, dressing, etc.)  - Assess/evaluate cause of self-care deficits   - Assess range of motion  - Assess patient's mobility  - Assess patient's need for assistive devices and provide as  appropriate  - Encourage maximum independence but intervene and supervise when necessary  - Involve family in performance of ADLs  - Assess for home care needs following discharge   - Consider OT consult to assist with ADL evaluation and planning for discharge  - Provide patient education as appropriate  Outcome: Progressing  Goal: Maintain proper alignment of affected body part  Description: INTERVENTIONS:  - Support, maintain and protect limb and body alignment  - Provide patient/ family with appropriate education  Outcome: Progressing

## 2024-06-09 ENCOUNTER — APPOINTMENT (INPATIENT)
Dept: RADIOLOGY | Facility: HOSPITAL | Age: 44
DRG: 673 | End: 2024-06-09
Payer: COMMERCIAL

## 2024-06-09 PROBLEM — R65.10 SIRS (SYSTEMIC INFLAMMATORY RESPONSE SYNDROME) (HCC): Status: ACTIVE | Noted: 2024-06-09

## 2024-06-09 LAB
ABO GROUP BLD: NORMAL
ANION GAP SERPL CALCULATED.3IONS-SCNC: 11 MMOL/L (ref 4–13)
ANION GAP SERPL CALCULATED.3IONS-SCNC: 11 MMOL/L (ref 4–13)
ANION GAP SERPL CALCULATED.3IONS-SCNC: 13 MMOL/L (ref 4–13)
BASE EX.OXY STD BLDV CALC-SCNC: 92.3 % (ref 60–80)
BASE EXCESS BLDV CALC-SCNC: -4 MMOL/L
BLD GP AB SCN SERPL QL: NEGATIVE
BNP SERPL-MCNC: 288 PG/ML (ref 0–100)
BODY TEMPERATURE: 96.8 DEGREES FEHRENHEIT
BUN SERPL-MCNC: 66 MG/DL (ref 5–25)
BUN SERPL-MCNC: 71 MG/DL (ref 5–25)
BUN SERPL-MCNC: 73 MG/DL (ref 5–25)
CA-I BLD-SCNC: 0.97 MMOL/L (ref 1.12–1.32)
CA-I BLD-SCNC: 0.99 MMOL/L (ref 1.12–1.32)
CALCIUM SERPL-MCNC: 7.1 MG/DL (ref 8.4–10.2)
CALCIUM SERPL-MCNC: 7.1 MG/DL (ref 8.4–10.2)
CALCIUM SERPL-MCNC: 7.2 MG/DL (ref 8.4–10.2)
CHLORIDE SERPL-SCNC: 96 MMOL/L (ref 96–108)
CHLORIDE SERPL-SCNC: 97 MMOL/L (ref 96–108)
CHLORIDE SERPL-SCNC: 98 MMOL/L (ref 96–108)
CO2 SERPL-SCNC: 24 MMOL/L (ref 21–32)
CO2 SERPL-SCNC: 25 MMOL/L (ref 21–32)
CO2 SERPL-SCNC: 25 MMOL/L (ref 21–32)
CREAT SERPL-MCNC: 10.42 MG/DL (ref 0.6–1.3)
CREAT SERPL-MCNC: 9.12 MG/DL (ref 0.6–1.3)
CREAT SERPL-MCNC: 9.7 MG/DL (ref 0.6–1.3)
CREAT UR-MCNC: 386.8 MG/DL
ERYTHROCYTE [DISTWIDTH] IN BLOOD BY AUTOMATED COUNT: 15.4 % (ref 11.6–15.1)
GFR SERPL CREATININE-BSD FRML MDRD: 5 ML/MIN/1.73SQ M
GFR SERPL CREATININE-BSD FRML MDRD: 5 ML/MIN/1.73SQ M
GFR SERPL CREATININE-BSD FRML MDRD: 6 ML/MIN/1.73SQ M
GLUCOSE SERPL-MCNC: 104 MG/DL (ref 65–140)
GLUCOSE SERPL-MCNC: 110 MG/DL (ref 65–140)
GLUCOSE SERPL-MCNC: 113 MG/DL (ref 65–140)
GLUCOSE SERPL-MCNC: 119 MG/DL (ref 65–140)
GLUCOSE SERPL-MCNC: 122 MG/DL (ref 65–140)
GLUCOSE SERPL-MCNC: 123 MG/DL (ref 65–140)
GLUCOSE SERPL-MCNC: 99 MG/DL (ref 65–140)
HCO3 BLDV-SCNC: 24.2 MMOL/L (ref 24–30)
HCT VFR BLD AUTO: 21.2 % (ref 36.5–49.3)
HGB BLD-MCNC: 6.7 G/DL (ref 12–17)
HGB BLD-MCNC: 7.2 G/DL (ref 12–17)
LACTATE SERPL-SCNC: 0.4 MMOL/L (ref 0.5–2)
MAGNESIUM SERPL-MCNC: 1.6 MG/DL (ref 1.9–2.7)
MAGNESIUM SERPL-MCNC: 1.8 MG/DL (ref 1.9–2.7)
MAGNESIUM SERPL-MCNC: 1.9 MG/DL (ref 1.9–2.7)
MCH RBC QN AUTO: 28.2 PG (ref 26.8–34.3)
MCHC RBC AUTO-ENTMCNC: 31.6 G/DL (ref 31.4–37.4)
MCV RBC AUTO: 89 FL (ref 82–98)
NASAL CANNULA: 8
O2 CT BLDV-SCNC: 10.9 ML/DL
PCO2 BLDV: 63.9 MM HG (ref 42–50)
PH BLDV: 7.2 [PH] (ref 7.3–7.4)
PHOSPHATE SERPL-MCNC: 10.2 MG/DL (ref 2.7–4.5)
PHOSPHATE SERPL-MCNC: 8.5 MG/DL (ref 2.7–4.5)
PLATELET # BLD AUTO: 281 THOUSANDS/UL (ref 149–390)
PMV BLD AUTO: 8.8 FL (ref 8.9–12.7)
PO2 BLDV: 84.9 MM HG (ref 35–45)
POTASSIUM SERPL-SCNC: 4.5 MMOL/L (ref 3.5–5.3)
POTASSIUM SERPL-SCNC: 4.5 MMOL/L (ref 3.5–5.3)
POTASSIUM SERPL-SCNC: 4.7 MMOL/L (ref 3.5–5.3)
PROCALCITONIN SERPL-MCNC: 10.26 NG/ML
RBC # BLD AUTO: 2.38 MILLION/UL (ref 3.88–5.62)
RH BLD: POSITIVE
SODIUM SERPL-SCNC: 132 MMOL/L (ref 135–147)
SODIUM SERPL-SCNC: 134 MMOL/L (ref 135–147)
SODIUM SERPL-SCNC: 134 MMOL/L (ref 135–147)
SPECIMEN EXPIRATION DATE: NORMAL
UUN 24H UR-MCNC: 180 MG/DL
VANCOMYCIN TROUGH SERPL-MCNC: 20.1 UG/ML (ref 10–20)
WBC # BLD AUTO: 13.08 THOUSAND/UL (ref 4.31–10.16)

## 2024-06-09 PROCEDURE — 73700 CT LOWER EXTREMITY W/O DYE: CPT

## 2024-06-09 PROCEDURE — 99232 SBSQ HOSP IP/OBS MODERATE 35: CPT

## 2024-06-09 PROCEDURE — 82330 ASSAY OF CALCIUM: CPT

## 2024-06-09 PROCEDURE — 94760 N-INVAS EAR/PLS OXIMETRY 1: CPT

## 2024-06-09 PROCEDURE — 74176 CT ABD & PELVIS W/O CONTRAST: CPT

## 2024-06-09 PROCEDURE — 80048 BASIC METABOLIC PNL TOTAL CA: CPT | Performed by: INTERNAL MEDICINE

## 2024-06-09 PROCEDURE — 80048 BASIC METABOLIC PNL TOTAL CA: CPT

## 2024-06-09 PROCEDURE — 71250 CT THORAX DX C-: CPT

## 2024-06-09 PROCEDURE — 99223 1ST HOSP IP/OBS HIGH 75: CPT

## 2024-06-09 PROCEDURE — 86850 RBC ANTIBODY SCREEN: CPT

## 2024-06-09 PROCEDURE — 84145 PROCALCITONIN (PCT): CPT

## 2024-06-09 PROCEDURE — 83735 ASSAY OF MAGNESIUM: CPT

## 2024-06-09 PROCEDURE — 85027 COMPLETE CBC AUTOMATED: CPT

## 2024-06-09 PROCEDURE — 82948 REAGENT STRIP/BLOOD GLUCOSE: CPT

## 2024-06-09 PROCEDURE — 99233 SBSQ HOSP IP/OBS HIGH 50: CPT | Performed by: INTERNAL MEDICINE

## 2024-06-09 PROCEDURE — 82805 BLOOD GASES W/O2 SATURATION: CPT

## 2024-06-09 PROCEDURE — 94660 CPAP INITIATION&MGMT: CPT

## 2024-06-09 PROCEDURE — 85018 HEMOGLOBIN: CPT

## 2024-06-09 PROCEDURE — 80202 ASSAY OF VANCOMYCIN: CPT

## 2024-06-09 PROCEDURE — 86900 BLOOD TYPING SEROLOGIC ABO: CPT

## 2024-06-09 PROCEDURE — 86920 COMPATIBILITY TEST SPIN: CPT

## 2024-06-09 PROCEDURE — 86901 BLOOD TYPING SEROLOGIC RH(D): CPT

## 2024-06-09 PROCEDURE — 82570 ASSAY OF URINE CREATININE: CPT

## 2024-06-09 PROCEDURE — 84100 ASSAY OF PHOSPHORUS: CPT

## 2024-06-09 PROCEDURE — 83605 ASSAY OF LACTIC ACID: CPT

## 2024-06-09 PROCEDURE — 90945 DIALYSIS ONE EVALUATION: CPT

## 2024-06-09 PROCEDURE — 84540 ASSAY OF URINE/UREA-N: CPT

## 2024-06-09 PROCEDURE — 83036 HEMOGLOBIN GLYCOSYLATED A1C: CPT

## 2024-06-09 PROCEDURE — 82330 ASSAY OF CALCIUM: CPT | Performed by: INTERNAL MEDICINE

## 2024-06-09 RX ORDER — MAGNESIUM SULFATE HEPTAHYDRATE 40 MG/ML
2 INJECTION, SOLUTION INTRAVENOUS ONCE
Status: COMPLETED | OUTPATIENT
Start: 2024-06-09 | End: 2024-06-09

## 2024-06-09 RX ORDER — BUMETANIDE 0.25 MG/ML
0.5 INJECTION INTRAMUSCULAR; INTRAVENOUS CONTINUOUS
Status: DISCONTINUED | OUTPATIENT
Start: 2024-06-09 | End: 2024-06-09

## 2024-06-09 RX ORDER — ALBUMIN (HUMAN) 12.5 G/50ML
25 SOLUTION INTRAVENOUS ONCE
Status: COMPLETED | OUTPATIENT
Start: 2024-06-09 | End: 2024-06-09

## 2024-06-09 RX ORDER — MAGNESIUM SULFATE 1 G/100ML
1 INJECTION INTRAVENOUS ONCE
Status: COMPLETED | OUTPATIENT
Start: 2024-06-09 | End: 2024-06-09

## 2024-06-09 RX ADMIN — NYSTATIN 1 APPLICATION: 100000 POWDER TOPICAL at 10:13

## 2024-06-09 RX ADMIN — Medication 20000 ML: at 18:00

## 2024-06-09 RX ADMIN — ALBUMIN (HUMAN) 12.5 G: 0.25 INJECTION, SOLUTION INTRAVENOUS at 00:09

## 2024-06-09 RX ADMIN — PIPERACILLIN AND TAZOBACTAM 4.5 G: 4; .5 INJECTION, POWDER, FOR SOLUTION INTRAVENOUS at 17:46

## 2024-06-09 RX ADMIN — ALBUMIN (HUMAN) 25 G: 0.25 INJECTION, SOLUTION INTRAVENOUS at 17:36

## 2024-06-09 RX ADMIN — HYDROMORPHONE HYDROCHLORIDE 0.5 MG: 1 INJECTION, SOLUTION INTRAMUSCULAR; INTRAVENOUS; SUBCUTANEOUS at 02:49

## 2024-06-09 RX ADMIN — Medication 20000 ML: at 16:30

## 2024-06-09 RX ADMIN — CYANOCOBALAMIN TAB 500 MCG 1000 MCG: 500 TAB at 10:10

## 2024-06-09 RX ADMIN — CALCIUM GLUCONATE 3 G: 98 INJECTION, SOLUTION INTRAVENOUS at 21:30

## 2024-06-09 RX ADMIN — ONDANSETRON 4 MG: 2 INJECTION INTRAMUSCULAR; INTRAVENOUS at 10:00

## 2024-06-09 RX ADMIN — OXYCODONE HYDROCHLORIDE 5 MG: 5 TABLET ORAL at 20:10

## 2024-06-09 RX ADMIN — OXYCODONE HYDROCHLORIDE 5 MG: 5 TABLET ORAL at 00:17

## 2024-06-09 RX ADMIN — HEPARIN SODIUM 5000 UNITS: 5000 INJECTION, SOLUTION INTRAVENOUS; SUBCUTANEOUS at 05:12

## 2024-06-09 RX ADMIN — HEPARIN SODIUM 5000 UNITS: 5000 INJECTION, SOLUTION INTRAVENOUS; SUBCUTANEOUS at 21:10

## 2024-06-09 RX ADMIN — ATORVASTATIN CALCIUM 80 MG: 80 TABLET, FILM COATED ORAL at 10:10

## 2024-06-09 RX ADMIN — CEFAZOLIN SODIUM 1000 MG: 1 SOLUTION INTRAVENOUS at 04:21

## 2024-06-09 RX ADMIN — NYSTATIN 1 APPLICATION: 100000 POWDER TOPICAL at 20:20

## 2024-06-09 RX ADMIN — MAGNESIUM SULFATE HEPTAHYDRATE 2 G: 40 INJECTION, SOLUTION INTRAVENOUS at 10:11

## 2024-06-09 RX ADMIN — HYDROMORPHONE HYDROCHLORIDE 0.5 MG: 1 INJECTION, SOLUTION INTRAMUSCULAR; INTRAVENOUS; SUBCUTANEOUS at 12:15

## 2024-06-09 RX ADMIN — HYDROMORPHONE HYDROCHLORIDE 0.5 MG: 1 INJECTION, SOLUTION INTRAMUSCULAR; INTRAVENOUS; SUBCUTANEOUS at 21:27

## 2024-06-09 RX ADMIN — HEPARIN SODIUM 5000 UNITS: 5000 INJECTION, SOLUTION INTRAVENOUS; SUBCUTANEOUS at 14:18

## 2024-06-09 RX ADMIN — HYOSCYAMINE SULFATE 1 APPLICATION: 16 SOLUTION at 10:13

## 2024-06-09 RX ADMIN — Medication 5000 UNITS: at 10:10

## 2024-06-09 RX ADMIN — Medication 20000 ML: at 22:44

## 2024-06-09 RX ADMIN — Medication 0.5 MG/HR: at 07:01

## 2024-06-09 RX ADMIN — MAGNESIUM SULFATE HEPTAHYDRATE 1 G: 1 INJECTION, SOLUTION INTRAVENOUS at 21:01

## 2024-06-09 RX ADMIN — OXYCODONE HYDROCHLORIDE 5 MG: 5 TABLET ORAL at 10:08

## 2024-06-09 NOTE — ASSESSMENT & PLAN NOTE
hsTNL negative x3 on admission   Patient with known CAD  ? Related to volume status  Continue to monitor

## 2024-06-09 NOTE — ASSESSMENT & PLAN NOTE
Hemoglobin 6.7 on AM labs  Baseline hemoglobin appears to be around 8    Plan:  Obtain type and screen  Obtain repeat hemoglobin   Transfuse to maintain hemoglobin > 7

## 2024-06-09 NOTE — PLAN OF CARE
Problem: Prexisting or High Potential for Compromised Skin Integrity  Goal: Skin integrity is maintained or improved  Description: INTERVENTIONS:  - Identify patients at risk for skin breakdown  - Assess and monitor skin integrity  - Assess and monitor nutrition and hydration status  - Monitor labs   - Assess for incontinence   - Turn and reposition patient  - Assist with mobility/ambulation  - Relieve pressure over bony prominences  - Avoid friction and shearing  - Provide appropriate hygiene as needed including keeping skin clean and dry  - Evaluate need for skin moisturizer/barrier cream  - Collaborate with interdisciplinary team   - Patient/family teaching  - Consider wound care consult   Outcome: Progressing     Problem: PAIN - ADULT  Goal: Verbalizes/displays adequate comfort level or baseline comfort level  Description: Interventions:  - Encourage patient to monitor pain and request assistance  - Assess pain using appropriate pain scale  - Administer analgesics based on type and severity of pain and evaluate response  - Implement non-pharmacological measures as appropriate and evaluate response  - Consider cultural and social influences on pain and pain management  - Notify physician/advanced practitioner if interventions unsuccessful or patient reports new pain  Outcome: Progressing     Problem: INFECTION - ADULT  Goal: Absence or prevention of progression during hospitalization  Description: INTERVENTIONS:  - Assess and monitor for signs and symptoms of infection  - Monitor lab/diagnostic results  - Monitor all insertion sites, i.e. indwelling lines, tubes, and drains  - [place] appropriate cooling/warming therapies per order  - Administer medications as ordered  - Instruct and encourage patient and family to use good hand hygiene technique  - Identify and instruct in appropriate isolation precautions for identified infection/condition  Outcome: Progressing  Goal: Absence of fever/infection during  neutropenic period  Description: INTERVENTIONS:  - Monitor WBC    Outcome: Progressing     Problem: SAFETY ADULT  Goal: Patient will remain free of falls  Description: INTERVENTIONS:  - Educate patient/family on patient safety including physical limitations  - Instruct patient to call for assistance with activity   - Consult OT/PT to assist with strengthening/mobility   - Keep Call bell within reach  - Keep bed low and locked with side rails adjusted as appropriate  - Keep care items and personal belongings within reach  - Initiate and maintain comfort rounds  - Make Fall Risk Sign visible to staff  - Apply yellow socks and bracelet for high fall risk patients  - Consider moving patient to room near nurses station  Outcome: Progressing  Goal: Maintain or return to baseline ADL function  Description: INTERVENTIONS:  -  Assess patient's ability to carry out ADLs; assess patient's baseline for ADL function and identify physical deficits which impact ability to perform ADLs (bathing, care of mouth/teeth, toileting, grooming, dressing, etc.)  - Assess/evaluate cause of self-care deficits   - Assess range of motion  - Assess patient's mobility; develop plan if impaired  - Assess patient's need for assistive devices and provide as appropriate  - Encourage maximum independence but intervene and supervise when necessary  - Involve family in performance of ADLs  - Assess for home care needs following discharge   - Consider OT consult to assist with ADL evaluation and planning for discharge  - Provide patient education as appropriate  Outcome: Progressing  Goal: Maintains/Returns to pre admission functional level  Description: INTERVENTIONS:  - Perform AM-PAC 6 Click Basic Mobility/ Daily Activity assessment daily.  - Set and communicate daily mobility goal to care team and patient/family/caregiver.   - Collaborate with rehabilitation services on mobility goals if consulted  - Out of bed for toileting  - Record patient  progress and toleration of activity level   Outcome: Progressing     Problem: CARDIOVASCULAR - ADULT  Goal: Maintains optimal cardiac output and hemodynamic stability  Description: INTERVENTIONS:  - Monitor I/O, vital signs and rhythm  - Monitor for S/S and trends of decreased cardiac output  - Administer and titrate ordered vasoactive medications to optimize hemodynamic stability  - Assess quality of pulses, skin color and temperature  - Assess for signs of decreased coronary artery perfusion  - Instruct patient to report change in severity of symptoms  Outcome: Progressing  Goal: Absence of cardiac dysrhythmias or at baseline rhythm  Description: INTERVENTIONS:  - Continuous cardiac monitoring, vital signs, obtain 12 lead EKG if ordered  - Administer antiarrhythmic and heart rate control medications as ordered  - Monitor electrolytes and administer replacement therapy as ordered  Outcome: Progressing     Problem: RESPIRATORY - ADULT  Goal: Achieves optimal ventilation and oxygenation  Description: INTERVENTIONS:  - Assess for changes in respiratory status  - Assess for changes in mentation and behavior  - Position to facilitate oxygenation and minimize respiratory effort  - Oxygen administered by appropriate delivery if ordered  - Initiate smoking cessation education as indicated  - Encourage broncho-pulmonary hygiene including cough, deep breathe, Incentive Spirometry  - Assess the need for suctioning and aspirate as needed  - Assess and instruct to report SOB or any respiratory difficulty  - Respiratory Therapy support as indicated  Outcome: Progressing     Problem: SKIN/TISSUE INTEGRITY - ADULT  Goal: Incision(s), wounds(s) or drain site(s) healing without S/S of infection  Description: INTERVENTIONS  - Assess and document dressing, incision, wound bed, drain sites and surrounding tissue  - Provide patient and family education  - Perform skin care/dressing changes  Outcome: Progressing  Goal: Skin Integrity  remains intact(Skin Breakdown Prevention)  Description: Assess:  -Perform Juan assessment every 12 hrs  -Clean and moisturize skin every 12 hrs  -Inspect skin when repositioning, toileting, and assisting with ADLS  -Assess under medical devices such as oxygen tubing, lewis tube every 12 hrs and PRN  -Assess extremities for adequate circulation and sensation     Bed Management:  -Have minimal linens on bed & keep smooth, unwrinkled  -Change linens as needed when moist or perspiring  -Avoid sitting or lying in one position for more than 2 hours while in bed    Activity:  -Mobilize patient 4 times a day  -Encourage activity   -Encourage or provide ROM exercises   -Turn and reposition patient every 2 Hours  -Use appropriate equipment to lift or move patient in bed      Skin Care:  -Avoid use of baby powder, tape, friction and shearing, hot water or constrictive clothing  -Do not massage red bony areas      Problem: NEUROSENSORY - ADULT  Goal: Achieves maximal functionality and self care  Description: INTERVENTIONS  - Monitor swallowing and airway patency with patient fatigue and changes in neurological status  - Encourage and assist patient to increase activity and self care.   - Encourage visually impaired, hearing impaired and aphasic patients to use assistive/communication devices  Outcome: Progressing     Problem: METABOLIC, FLUID AND ELECTROLYTES - ADULT  Goal: Electrolytes maintained within normal limits  Description: INTERVENTIONS:  - Monitor labs and assess patient for signs and symptoms of electrolyte imbalances  - Administer electrolyte replacement as ordered  - Monitor response to electrolyte replacements, including repeat lab results as appropriate  - Instruct patient on fluid and nutrition as appropriate  Outcome: Progressing  Goal: Fluid balance maintained  Description: INTERVENTIONS:  - Monitor labs   - Monitor I/O and WT  - Instruct patient on fluid and nutrition as appropriate  - Assess for signs &  symptoms of volume excess or deficit  Outcome: Progressing  Goal: Glucose maintained within target range  Description: INTERVENTIONS:  - Monitor Blood Glucose as ordered  - Assess for signs and symptoms of hyperglycemia and hypoglycemia  - Administer ordered medications to maintain glucose within target range  - Assess nutritional intake and initiate nutrition service referral as needed  Outcome: Progressing     Problem: MUSCULOSKELETAL - ADULT  Goal: Maintain or return mobility to safest level of function  Description: INTERVENTIONS:  - Assess patient's ability to carry out ADLs; assess patient's baseline for ADL function and identify physical deficits which impact ability to perform ADLs (bathing, care of mouth/teeth, toileting, grooming, dressing, etc.)  - Assess/evaluate cause of self-care deficits   - Assess range of motion  - Assess patient's mobility  - Assess patient's need for assistive devices and provide as appropriate  - Encourage maximum independence but intervene and supervise when necessary  - Involve family in performance of ADLs  - Assess for home care needs following discharge   - Consider OT consult to assist with ADL evaluation and planning for discharge  - Provide patient education as appropriate  Outcome: Progressing  Goal: Maintain proper alignment of affected body part  Description: INTERVENTIONS:  - Support, maintain and protect limb and body alignment  - Provide patient/ family with appropriate education  Outcome: Progressing     Outcome: Progressing

## 2024-06-09 NOTE — ASSESSMENT & PLAN NOTE
Worsening hypoxia requiring MFNC   Possibly in the setting of hypervolemia    Plan:  Follow up CT chest   Wean fi02 to maintain sp02 > 92%

## 2024-06-09 NOTE — ASSESSMENT & PLAN NOTE
Unclear source of infection   Was being treated for possible lower extremity cellulitis however, site does not appear red or warm; patient reports pain has not improved    Plan:  Follow up CT leg  Follow up CT chest/abd/pelvis  Follow up blood cultures  Follow up lactic  Procal continues to rise  Escalate to Zosyn at this time   Trend fever/WBC

## 2024-06-09 NOTE — CONSULTS
[facility]  Consult  Name: Joaquin Frankel 44 y.o. male I MRN: [id number]  Unit/Bed#: ICU 11 I Date of Admission: 6/3/2024   Date of Service: 6/9/2024 I Hospital Day: 6    Inpatient consult to Medical Critical Care  Consult performed by: PETRA Chung  Consult ordered by: PETRA Vásquez        Assessment & Plan   SIRS (systemic inflammatory response syndrome) (HCC)  Assessment & Plan  Unclear source of infection   Was being treated for possible lower extremity cellulitis however, site does not appear red or warm; patient reports pain has not improved    Plan:  Follow up CT leg  Follow up CT chest/abd/pelvis  Follow up blood cultures  Follow up lactic  Procal continues to rise  Escalate to Zosyn at this time   Trend fever/WBC     Wound of abdomen  Assessment & Plan  Wound care nursing following  Continue site care    Acute respiratory failure with hypoxia (HCC)  Assessment & Plan  Worsening hypoxia requiring MFNC   Possibly in the setting of hypervolemia    Plan:  Follow up CT chest   Wean fi02 to maintain sp02 > 92%    Chronic acquired lymphedema  Assessment & Plan  Follow up with CT of lower extremity     Anemia of chronic disease  Assessment & Plan  Hemoglobin 6.7 on AM labs  Baseline hemoglobin appears to be around 8    Plan:  Obtain type and screen  Obtain repeat hemoglobin   Transfuse to maintain hemoglobin > 7    Chest pain  Assessment & Plan  hsTNL negative x3 on admission   Patient with known CAD  ? Related to volume status  Continue to monitor     Morbid obesity (HCC)  Assessment & Plan  Encourage lifestyle modifications on discharge  Nutrition consult     Hyponatremia  Assessment & Plan  Likely secondary to hypervolemia   Trend     Diabetes (HCC)  Assessment & Plan  Continue SSI + lantus  Make adjustments accordingly     Hypertension  Assessment & Plan  OP regimen consists of: carediolol, amlodipine, and lisinopril  Continue to hold lisinopril in the  setting of acute renal failure  Continue to hold all anti-hypertensive's in the setting of low BP    Coronary artery disease  Assessment & Plan  S/p stents in 2020  OP regimen consists of: aspirin and statin  Continue to hold aspirin for planned renal biopsy     * CLOVIS (acute kidney injury) (HCC)  Assessment & Plan  Creatine on admission 6.4  Baseline creatine 1.2-1.6  Most recent creatine 9.70  Unclear etiology   6/6 temp HD cath placed  6/7 first iHD   6/8 iHD  6/9 placed on Bumex infusion per nephro    Plan:  Obtain FEUrea   Kidney biopsy per nephrology   Monitor strict I&O  Possible CVVHD            History of Present Illness     HPI: Joaquin Frankel is a 44 y.o. who presented on 6/3 with chest pain and shortness of breath. Patient was found to have acute renal failure. He has a past medical history of CAD with stents, hypertension, obesity and diabetes type 2.     Patient was admitted to internal medicine service with consults to cardiology and nephrology. Chest pain/RICHARDSON was felt to be secondary to volume status from renal failure.     Etiology remains unclear for renal failure and has continued to have worsening creatine and volume status. IR was consulted and placed a temp right IJ iHD catheter on 6/6. Patient underwent iHD on 6/7 and 6/8. He developed worsening hypoxia and was placed on Bumex infusion. Patient with low blood pressures; therefore, is being transferred to ICU for initiation of CVVHD.      History obtained from chart review and the patient.  Review of Systems: Review of Systems   Constitutional:  Positive for fatigue.   Respiratory:  Positive for shortness of breath.    Cardiovascular:  Positive for chest pain and leg swelling.   Neurological:  Positive for weakness.     Disposition: Critical care   Historical Information   Past Medical History:  10/07/2023: Acute hypoxic respiratory failure (HCC)  No date: Arthritis  No date: Breathing difficulty  10/07/2023: Cellulitis  No date: Coronary  "artery disease  No date: Diabetes mellitus ([facility])  05/28/2021: Diabetic neuropathy ([facility])  No date: Heart disease      Comment:  CAD   s/p ptca with 1 stent 2012  No date: Hidradenitis suppurativa      Comment:  groin  No date: History of transfusion  No date: Hyperlipidemia  No date: Hypertension  No date: MI (myocardial infarction) ([facility])      Comment:  \" silent \" M.I. in the past  No date: Morbid obesity with BMI of 50.0-59.9, adult ([facility])  No date: Nicotine dependence  No date: Obesity  No date: Pilonidal cyst  11/29/2020: Type 1 non-ST elevation myocardial infarction (NSTEMI)   ([facility]) Past Surgical History:  No date: CARDIAC SURGERY  No date: CORONARY ANGIOPLASTY WITH STENT PLACEMENT  1/24/2017: INCISION AND DRAINAGE OF WOUND; N/A      Comment:  Procedure: INCISION AND DRAINAGE (I&D) BUTTOCK,                PILONIDAL CYST;  Surgeon: Simba Adhikari MD;  Location: [facility]                MAIN OR;  Service:   6/6/2024: IR TEMPORARY DIALYSIS CATHETER PLACEMENT  No date: LEG SURGERY; Left      Comment:  I&D of left leg 2012 7/6/2021: MA DEBRIDEMENT SUBCUTANEOUS TISSUE 1ST 20 SQ CM/<; Left      Comment:  Procedure: DEBRIDEMENT WOUND (WASH OUT);  Surgeon: Sudheer Mcfarlane MD;  Location: [facility];  Service: General  4/6/2021: MA EXC B9 LESION MRGN XCP SK TG T/A/L >4.0 CM; Left      Comment:  Procedure: EXCISION THIGH MASS;  Surgeon: Sudheer Mcfarlane MD;  [facility];  Service: General  7/6/2021: MA EXCISION H/P/P/U COMPLEX REPAIR; Left      Comment:  Procedure: EXCISION PERINEAL ABSCESS LEFT THIGH;                 Surgeon: Sudheer Mcfarlane MD;  [facility];  Service:               General  4/6/2021: MA NEGATIVE PRESSURE WOUND THERAPY DME <= 50 SQ CM; Left      Comment:  Procedure: APPLICATION VAC DRESSING THIGH;  Surgeon:                Sudheer Mcfarlane MD;  [facility];  Service: General  4/17/2021: WOUND DEBRIDEMENT; Left      Comment:  Procedure: DEBRIDEMENT WOUND AND DRESSING CHANGE (WASH     "            OUT);  Surgeon: Mahin Traore DO;  [facility];               Service: General  4/22/2021: WOUND DEBRIDEMENT; Left      Comment:  Procedure: DEBRIDEMENT LOWER EXTREMITY (WASH OUT), left                groin and thigh;  Surgeon: Sudheer Mcfarlane MD;  Location: [facility]                MAIN OR;  Service: General  5/26/2021: WOUND DEBRIDEMENT; Left      Comment:  Procedure: DEBRIDEMENT LOWER EXTREMITY (WASH OUT);                 Surgeon: Zak Castanon DO;  [facility];                 Service: General  5/28/2021: WOUND DEBRIDEMENT; Left      Comment:  Procedure: Washout left thigh wound and closure;                 Surgeon: Amor Jaramillo DO;  [facility]: [facility];                 Service: General   Current Outpatient Medications   Medication Instructions    amLODIPine (NORVASC) 10 mg, Oral, Daily    aspirin (ECOTRIN LOW STRENGTH) 81 mg, Oral, Daily    atorvastatin (LIPITOR) 80 mg, Oral, Daily    carvedilol (COREG) 6.25 mg, Oral, 2 times daily with meals    EPINEPHrine (EPIPEN) 0.3 mg, Intramuscular, Once    glimepiride (AMARYL) 2 mg, Oral, Daily with breakfast    insulin glargine (LANTUS) 12 Units, Subcutaneous, Daily at bedtime    Insulin Pen Needle (BD ULTRA-FINE PEN NEEDLES) 29G X 12.7MM MISC USE 1 NEEDLE ONCE DAILY FOR INJECTION UNDER THE SKIN    lisinopril (ZESTRIL) 20 mg, Oral, Daily    Allergies   Allergen Reactions    Keflex [Cephalexin] Facial Swelling and Lip Swelling    Amoxicillin Hives     childhood      Social History     Tobacco Use    Smoking status: Former     Current packs/day: 0.00     Average packs/day: 1.5 packs/day for 20.0 years (29.9 ttl pk-yrs)     Types: Cigarettes     Start date: 01/2021     Quit date: 03/2021     Years since quitting: 3.2     Passive exposure: Past    Smokeless tobacco: Never   Vaping Use    Vaping status: Never Used   Substance Use Topics    Alcohol use: Not Currently     Comment: rarely    Drug use: No    Family History   Problem Relation Age  of Onset    Heart disease Father     Diabetes Father     Hypertension Mother     Heart disease Mother         Objective                            Vitals I/O      Most Recent Min/Max in 24hrs   Temp (!) 96.5 °F (35.8 °C) Temp  Min: 96.5 °F (35.8 °C)  Max: 100.2 °F (37.9 °C)   Pulse 80 Pulse  Min: 68  Max: 83   Resp 21 Resp  Min: 18  Max: 21   /53 BP  Min: 88/44  Max: 111/51   O2 Sat 94 % SpO2  Min: 85 %  Max: 96 %      Intake/Output Summary (Last 24 hours) at 6/9/2024 1640  Last data filed at 6/8/2024 2331  Gross per 24 hour   Intake --   Output 100 ml   Net -100 ml       Diet NPO; Sips with meds  Diet Renal; Renal Restrictive; Yes; Fluid Restriction 1200 ML; No; Consistent Carbohydrate Diet Level 2 (5 carb servings/75 grams CHO/meal)    Invasive Monitoring           Physical Exam   Physical Exam  Eyes:      Pupils: Pupils are equal, round, and reactive to light.   Skin:     General: Skin is warm.   HENT:      Head: Normocephalic.      Mouth/Throat:      Mouth: Mucous membranes are moist.   Cardiovascular:      Rate and Rhythm: Normal rate and regular rhythm.      Comments: Left lower extremity lymphedema   Musculoskeletal:      Right lower leg: Edema present.      Left lower leg: Edema present.   Abdominal:      Palpations: Abdomen is soft.   Constitutional:       Appearance: He is ill-appearing.   Pulmonary:      Effort: No accessory muscle usage, respiratory distress or accessory muscle usage.      Breath sounds: No wheezing.   Neurological:      Mental Status: He is easily aroused and oriented to person, place and time.   Genitourinary/Anorectal:  Robbins present.          Diagnostic Studies      EKG: NSR  Imaging:  I have personally reviewed pertinent reports.       Medications:  Scheduled PRN   atorvastatin, 80 mg, Daily  Cholecalciferol, 5,000 Units, Daily  cyanocobalamin, 1,000 mcg, Daily  heparin (porcine), 5,000 Units, Q8H SHAZIA  insulin glargine, 10 Units, HS  insulin lispro, 1-6 Units, HS  insulin  lispro, 2-12 Units, TID AC  nystatin, 1 Application, BID  piperacillin-tazobactam, 4.5 g, Once   Followed by  piperacillin-tazobactam, 4.5 g, Q12H  sodium hypochlorite, 1 Application, Daily      acetaminophen, 650 mg, Q6H PRN  HYDROmorphone, 0.5 mg, Q3H PRN  ondansetron, 4 mg, Q6H PRN  oxyCODONE, 5 mg, Q4H PRN       Continuous    NxStage K 4/Ca 3, 20,000 mL         Labs:    CBC    Recent Labs     06/08/24  0558 06/09/24  0450 06/09/24  1538   WBC 14.64* 13.08*  --    HGB 8.0* 6.7* 7.2*   HCT 26.0* 21.2*  --     281  --      BMP    Recent Labs     06/09/24  0450 06/09/24  1538   SODIUM 132* 134*   K 4.5 4.7   CL 97 96   CO2 24 25   AGAP 11 13   BUN 71* 73*   CREATININE 9.70* 10.42*   CALCIUM 7.2* 7.1*       Coags    No recent results     Additional Electrolytes  Recent Labs     06/09/24  0450 06/09/24  1538   MG 1.6* 1.9   PHOS  --  10.2*   CAIONIZED  --  0.97*          Blood Gas    No recent results  No recent results LFTs  No recent results    Infectious  Recent Labs     06/08/24  0558 06/09/24  0450   PROCALCITONI 7.47* 10.26*     Glucose  Recent Labs     06/08/24  0558 06/09/24  0450 06/09/24  1538   GLUC 121 122 104               PETRA Chung

## 2024-06-09 NOTE — ASSESSMENT & PLAN NOTE
OP regimen consists of: carediolol, amlodipine, and lisinopril  Continue to hold lisinopril in the setting of acute renal failure  Continue to hold all anti-hypertensive's in the setting of low BP

## 2024-06-09 NOTE — PROGRESS NOTES
NEPHROLOGY HOSPITAL PROGRESS NOTE   Joaquin Frankel 44 y.o. male MRN: [id number]  Unit/Bed#: [address] [place] [address] Encounter: [id number]  Reason for Consult: CLOVIS    ASSESSMENT and PLAN:  Joaquin Frankel is a 44 y.o. male who was admitted to [facility] after presenting with shortness of breath and chest pain. A renal consultation is requested today for assistance in the management of CLOVIS.     CLOVIS.  Etiology of CLOVIS rule out MGRS +/-? cardiorenal syndrome on baseline of diabetic nephropathy +/- autoregulatory failure in setting of RAAS blockade +/- heavy NSAID use +/- rule out glomerulonephritis.  Baseline creatinine appears to be around 1.2-1.6.  Creatinine was rising as outpatient with most recent creatinine of 2.98 on 04/29/2024.  Creatinine on admission 6.40.  Peak creatinine 9.59 on 06/07/2024.  Status post initiation of hemodialysis on 06/07.  Urinalysis showing 10-20 RBC, 2-4 WBC.  No hydronephrosis on CT abdomen without contrast on admission.   Cardiac BNP elevated at 233 but can be falsely low in setting of morbid obesity.  Chest x-ray with signs of pulmonary vascular congestion.  CT chest with mosaic attenuation.  Serum and urine immunofixation showing IgG kappa monoclonal band.  KL ratio within normal limits.  No hypocomplementemia.  JOSE ROBERTO/dsDNA negative.  ANCA/Anti-GBM negative.   Kidney biopsy has been ordered for further evaluation, this cannot be performed before early next week due to aspirin use.  Status post 2 sessions of hemodialysis so far.  Overnight developed worsening hypoxia and volume overload.  We started Bumex drip at 0.5 mg/h but diuresis will be limited due to severe CLOVIS and hypotension discussed with critical care team and he may need transfer to ICU for closer monitoring and if diuretics fail, will need initiation of CVVHD.    Addendum: Patient now transferred to ICU.  Complains of ringing in his ears.  Bumex stopped.  CVVHD will be started for volume management.    2.  Chronic  kidney disease.  Most likely diabetic nephropathy.  Baseline creatinine appears to be around 1.2-1.6.  UACR 5200 mg/g 04/2024.     3.  Hypertension now with hypotension.  Home medications include amlodipine 10 mg daily, Coreg 6.25 mg twice daily, lisinopril 20 mg daily.  Amlodipine and lisinopril have been discontinued.  Currently on Coreg dose of which was decreased to 3.125 mg twice daily, use with hold parameters.    4.  Anemia in CKD.  Hemoglobin 6.7 today.  Ferritin 69, iron saturation 17 consistent with absolute iron deficiency.  Started on IV Venofer 200 mg every other day for 5 doses.  Hematology has evaluated the patient for anemia in setting of positive IgG kappa monoclonal band.  He may need blood transfusion today.    5.  Chest pain on admission.  VQ scan with low suspicion for PE.  IV heparin has been discontinued.  Echocardiogram with normal EF and grade 1 diastolic dysfunction.  Discussed with cardiology and aspirin held for anticipation of kidney biopsy.    6.  Secondary hyperparathyroidism.  .  Etiology most likely due to vitamin D deficiency +/- chronic kidney disease.  25-hydroxy vitamin D level less than 7.0.  Continue cholecalciferol 5000 units daily.  Hold off initiation of calcitriol as  hyperphosphatemia.    7.  Hyponatremia.  This is due to lack of free water clearance in setting of severe CLOVIS.  Serum sodium level today 132.  Continue to monitor serum sodium.  Continue fluid restriction.    8.  Metabolic acidosis.  Resolved with hemodialysis.    9.  Episode of fever 06/07.  Unclear etiology of fever.  Blood cultures are negative to date.  Management per primary team.    Discussed with internal medicine team.  After discussion, we agreed that patient may need to be transferred to ICU for closer monitoring of his respiratory status and volume status and he may need initiation of CVVHD.    SUBJECTIVE / 24H INTERVAL HISTORY:  Urine output recorded as 100 cc.  Overnight he became  hypotensive and hypoxic.  Currently requiring 10 L nasal cannula oxygen.  Complains of dyspnea.  Continues to complain of pain in his legs.    OBJECTIVE:  Current Weight: Weight - Scale: (!) 188 kg (414 lb 9.6 oz)  Vitals:    06/09/24 0710 06/09/24 0740 06/09/24 0817 06/09/24 0818   BP: (!) 99/46  (!) 94/42 (!) 94/42   BP Location:    Right arm   Pulse: 71  69 68   Resp:    20   Temp:   (!) 97.3 °F (36.3 °C) (!) 97.3 °F (36.3 °C)   TempSrc:    Axillary   SpO2: 95% 94% 93% 94%   Weight:       Height:           Intake/Output Summary (Last 24 hours) at 6/9/2024 0946  Last data filed at 6/8/2024 2331  Gross per 24 hour   Intake 300 ml   Output 600 ml   Net -300 ml     Review of Systems   Constitutional:  Negative for chills and fever.   HENT:  Negative for ear pain and sore throat.    Eyes:  Negative for pain and visual disturbance.   Respiratory:  Positive for shortness of breath. Negative for cough.    Cardiovascular:  Positive for leg swelling. Negative for chest pain and palpitations.   Gastrointestinal:  Negative for abdominal pain and vomiting.   Genitourinary:  Negative for dysuria and hematuria.   Musculoskeletal:  Negative for arthralgias and back pain.   Skin:  Negative for color change and rash.   Neurological:  Negative for seizures and syncope.   All other systems reviewed and are negative.    Physical Exam  Vitals and nursing note reviewed.   Constitutional:       General: He is not in acute distress.     Appearance: He is well-developed.   HENT:      Head: Normocephalic and atraumatic.   Eyes:      Conjunctiva/sclera: Conjunctivae normal.   Cardiovascular:      Rate and Rhythm: Normal rate and regular rhythm.      Heart sounds: No murmur heard.  Pulmonary:      Effort: Pulmonary effort is normal. No respiratory distress.      Comments: Diminished breath sounds bilaterally at bases  Abdominal:      Palpations: Abdomen is soft.      Tenderness: There is no abdominal tenderness.   Musculoskeletal:          General: No swelling.      Cervical back: Neck supple.      Right lower leg: Edema present.      Left lower leg: Edema present.      Comments: Left lower extremity lymphedema   Skin:     General: Skin is warm and dry.      Capillary Refill: Capillary refill takes less than 2 seconds.   Neurological:      Mental Status: He is alert.   Psychiatric:         Mood and Affect: Mood normal.       Medications:    Current Facility-Administered Medications:     acetaminophen (TYLENOL) tablet 650 mg, 650 mg, Oral, Q6H PRN, Les Batista MD, 650 mg at 06/07/24 1101    atorvastatin (LIPITOR) tablet 80 mg, 80 mg, Oral, Daily, Les Batista MD, 80 mg at 06/08/24 1038    bumetanide (BUMEX) 12.5 mg infusion 50 mL, 0.5 mg/hr, Intravenous, Continuous, PETRA Nolasco, Last Rate: 2 mL/hr at 06/09/24 0701, 0.5 mg/hr at 06/09/24 0701    carvedilol (COREG) tablet 3.125 mg, 3.125 mg, Oral, BID With Meals, Bandar Timmons MD, 3.125 mg at 06/08/24 1624    ceFAZolin (ANCEF) IVPB (premix in dextrose) 1,000 mg 50 mL, 1,000 mg, Intravenous, Q12H, PETRA Vásquez, Last Rate: 100 mL/hr at 06/09/24 0421, 1,000 mg at 06/09/24 0421    Cholecalciferol (VITAMIN D3) tablet 5,000 Units, 5,000 Units, Oral, Daily, Bandar Timmons MD, 5,000 Units at 06/08/24 1038    cyanocobalamin (VITAMIN B-12) tablet 1,000 mcg, 1,000 mcg, Oral, Daily, PETRA Cleary, 1,000 mcg at 06/08/24 1038    heparin (porcine) subcutaneous injection 5,000 Units, 5,000 Units, Subcutaneous, Q8H [facility], Les Batista MD, 5,000 Units at 06/09/24 0512    HYDROmorphone (DILAUDID) injection 0.5 mg, 0.5 mg, Intravenous, Q3H PRN, PETRA Vásquez, 0.5 mg at 06/09/24 0249    insulin glargine (LANTUS) subcutaneous injection 10 Units 0.1 mL, 10 Units, Subcutaneous, HS, PETRA Cleary, 10 Units at 06/08/24 2244    insulin lispro (HumALOG/ADMELOG) 100 units/mL subcutaneous injection 1-6 Units, 1-6 Units, Subcutaneous, HS,  Les Batista MD, 1 Units at 06/06/24 2150    insulin lispro (HumALOG/ADMELOG) 100 units/mL subcutaneous injection 2-12 Units, 2-12 Units, Subcutaneous, TID AC, 2 Units at 06/08/24 1720 **AND** Fingerstick Glucose (POCT), , , TID AC, Les Batista MD    iron sucrose (VENOFER) 200 mg in sodium chloride 0.9 % 100 mL IVPB, 200 mg, Intravenous, Every Other Day, Bandar Timmons MD, 200 mg at 06/08/24 1051    magnesium sulfate 2 g/50 mL IVPB (premix) 2 g, 2 g, Intravenous, Once, Olayide D Felicia, CRNP    nystatin (MYCOSTATIN) powder 1 Application, 1 Application, Topical, BID, PETRA Cleary, 1 Application at 06/08/24 1721    ondansetron (ZOFRAN) injection 4 mg, 4 mg, Intravenous, Q6H PRN, PETRA Nolasco, 4 mg at 06/07/24 2300    oxyCODONE (ROXICODONE) IR tablet 5 mg, 5 mg, Oral, Q4H PRN, Olayide D Olanicierra, CRNP, 5 mg at 06/09/24 0017    sodium hypochlorite (DAKIN'S HALF-STRENGTH) 0.25 percent topical solution 1 Application, 1 Application, Irrigation, Daily, Les Batista MD, 1 Application at 06/08/24 1048    vancomycin (VANCOCIN) 1,250 mg in sodium chloride 0.9 % 250 mL IVPB, 10 mg/kg (Adjusted), Intravenous, Once PRN, Olayide D Olanicierra, CRNP    Laboratory Results:  Results from last 7 days   Lab Units 06/09/24  0450 06/08/24  0558 06/07/24  0846 06/06/24  0312 06/05/24  0619 06/04/24  0836 06/04/24  0821 06/03/24  0714   WBC Thousand/uL 13.08* 14.64* 14.53* 9.66 9.15 8.60  --  9.91   HEMOGLOBIN g/dL 6.7* 8.0* 7.7* 8.0* 8.5* 8.0*  --  9.0*   HEMATOCRIT % 21.2* 26.0* 24.5* 25.0* 27.0* 25.0*  --  27.7*   PLATELETS Thousands/uL 281 309 316 306 317 319  --  327   POTASSIUM mmol/L 4.5 4.9 4.5 4.1 4.2  --  4.2 3.8   CHLORIDE mmol/L 97 98 100 101 103  --  104 104   CO2 mmol/L 24 18* 20* 20* 24  --  21 21   BUN mg/dL 71* 77* 91* 73* 71*  --  64* 62*   CREATININE mg/dL 9.70* 9.55* 9.59* 8.10* 7.28*  --  6.87* 6.40*   CALCIUM mg/dL 7.2* 7.2* 7.2* 7.6* 7.8*  --  7.9* 8.1*  "  MAGNESIUM mg/dL 1.6* 1.7*  --   --   --   --   --   --        Portions of the record may have been created with voice recognition software. Occasional wrong word or \"sound a like\" substitutions may have occurred due to the inherent limitations of voice recognition software. Read the chart carefully and recognize, using context, where substitutions have occurred. If you have any questions, please contact the dictating provider.    "

## 2024-06-09 NOTE — PROGRESS NOTES
[facility]  Progress Note  Name: Joaquin Frankel I  MRN: [id number]  Unit/Bed#: 4 [place] [address]  Date of Admission: 6/3/2024   Date of Service: 6/9/2024 I Hospital Day: 6    Assessment & Plan   * CLOVIS (acute kidney injury) (HCC)  Assessment & Plan  POA with creatinine of 6.40.    Creatinine: 8.01> 9.59  Unclear etiology of CLOVIS.  Likely multifactorial.    No evidence of hydronephrosis or obstruction on CT imaging.  Serum and urine immunofixation showing IgG kappa monoclonal band  Transition to IV Bumex drip 0.5mg/hr on 6/6/24  Plan for renal biopsy next Wednesday by IR. Hold aspirin until biopsy  Due to worsening creatinine and uremia, will place for HD tomorrow 6/7/24. IR consulted for HD catheter placement.  Nephrology following, input appreciated  Consult oncology  Monitor BMP  6/7: Patient received first dialysis today  6/8: Dialyzed today  Creatinine was noted elevated at 9.70 with low blood pressure requiring critical care consult  Patient will be transferred to ICU for higher level of care and closer monitoring    Volume overload  Assessment & Plan  Patient with evidence of volume overload on admission as evidenced by increased edema of the left lower extremity which already has chronic lymphedema.  He also has noticed increased abdominal girth and swelling in his pannus.  ECHO: LVEF 58%, diastolic function is mildly abnormal, consistent with grade 1 relaxation, bilateral atrial dilation, mitral valve with mild regurgitation, tricuspid valve with mild regurgitation, PA pressure 58 mmHg, there is mild pulmonic valve regurgitation  Continue lewis catheter for accurate I's and O's given body habitus  Both nephrology and cardiology are following for volume management  Continue IV Bumex infusion  Initiation of HD on 6/7/24  Patient on a renally restrictive diet.  Monitor daily weights.    Cellulitis of left lower extremity  Assessment & Plan  With a history of lymphedema .  On examination  today, LLE noted to be warm, red and swollen and patient with fever of 101.  WBC elevated at 14.53 > 14.64  Procalcitonin of 2.85 > 7.47.  Repeat in a.m.    Blood cultures x 2 pending  Lactic acid negative  Start vancomycin and cefazolin 1 g every 8 hrs  Pharmacy consult, appreciate input  Monitor fever curve and hemodynamics  CBC in a.m.  Follow-up on chest x-ray      Wound of abdomen  Assessment & Plan  Found by wound care nurse that patient has a left lower quadrant abdominal wound with tracking and purulent drainage for which she recommended general surgery consultation  Appreciate surgery input  No acute surgical intervention at this time  Continue local wound care per wound care nurse  Follow-up in the outpatient wound center    Acute respiratory failure with hypoxia (HCC)  Assessment & Plan  Patient also with evidence of acute hypoxic respiratory failure.    Likely multifactorial including volume overload, suspected underlying obstructive sleep apnea and obesity hypoventilation syndrome.    No evidence of pulmonary emboli on diagnostic studies.  Patient states he has an upcoming appointment with a sleep specialist and ultimately will undergo a sleep study as an outpatient.  He has been told he has overnight hypoxia.   Overnight pulse ox study qualifies patient for CPAP.      Chronic acquired lymphedema  Assessment & Plan  Patient developed LLE lymphedema after left lower extremity surgical procedure several years ago.  Patient complaining of increasing pain in the left lower extremity likely secondary to volume overload.  Left lower extremity venous Doppler: Pending  Patient would benefit from ambulatory referral to lymphedema clinic.    Anemia of chronic disease  Assessment & Plan  Patient has anemia of chronic disease likely secondary to underlying nephropathy.  However hemoglobin is lower today than baseline.  He did have some episodes of hematuria.  Robbins catheter is being inserted therefore will  monitor for future episodes of hematuria.  B12 310, add supplementation  Iron panel shows iron deficiency. IV venofer 200mg every other day x 5 doses per Nephrology  Continue to monitor hemoglobin closely and transfuse for hemoglobin less than 7.0 or drastic drop in hemoglobin.    Chest pain  Assessment & Plan  POA  VQ scan low suspicion for PE  Echo with normal EF and grade 1 diastolic dysfunction  Cardiology input appreciated  ASA on hold for renal biopsy   Denies any further chest pain complaints today    Morbid obesity (HCC)  Assessment & Plan  Patient counseled on the importance of exercise and following a healthy diet.    Diabetes (HCC)  Assessment & Plan  Lab Results   Component Value Date    HGBA1C 6.6 (H) 04/29/2024     Recent Labs     06/08/24  1642 06/08/24  2123 06/09/24  0714 06/09/24  1126   POCGLU 152* 118 123 113       Blood Sugar Average: Last 72 hrs:  (P) [id number]    Amaryl held during his hospitalization.  Resume at discharge.    Continue Lantus 10 units at bedtime along with sliding scale insulin.    Continue diabetic diet.      Hypertension  Assessment & Plan  Blood pressures are stable and acceptable on amlodipine 10 mg daily and carvedilol 6.25 mg twice a day.    Patient has now been switched to IV Bumex.  Follow blood pressures closely while being diuresed.    Coronary artery disease  Assessment & Plan  Patient with a history of coronary artery disease with multivessel disease status post stents.    Continue with aspirin, high-dose statin and beta-blocker.  Cardiology following and appreciate their input.           TE Pharmacologic Prophylaxis: VTE Score: 7 High Risk (Score >/= 5) - Pharmacological DVT Prophylaxis Ordered: heparin. Sequential Compression Devices Ordered.     Mobility:   Basic Mobility Inpatient Raw Score: 23  JH-HLM Goal: 7: Walk 25 feet or more  JH-HLM Achieved: 5: Stand (1 or more minutes)  JH-HLM Goal achieved. Continue to encourage appropriate mobility.      Patient Centered Rounds: I performed bedside rounds with nursing staff today.   Discussions with Specialists or Other Care Team Provider: Neprology, critical care, RN, CM     Education and Discussions with Family / Patient: Yes    Total Time Spent on Date of Encounter in care of patient:  mins. This time was spent on one or more of the following: performing physical exam; counseling and coordination of care; obtaining or reviewing history; documenting in the medical record; reviewing/ordering tests, medications or procedures; communicating with other healthcare professionals and discussing with patient's family/caregivers.     Current Length of Stay: 5 day(s)  Current Patient Status: Inpatient   Certification Statement: The patient will continue to require additional inpatient hospital stay due to clinical course  Discharge Plan: Anticipate discharge in 48 hrs to home.     Code Status: Level 1 - Full Code        Subjective:   Patient seen and examined at bedside.  Reports not feeling good and verbalizes nausea and vomiting and urgently requesting administration of Zofran.  Noted with hypoxia, evidence of sepsis with hypotension, tachypnea and low temperature.  Nephrology reached out to critical care for closer monitoring as patient may need initiation of CVVHD.  Met with family members at bedside and all questions were answered to the best of my ability.    Objective:     Vitals:   Temp (24hrs), Av.6 °F (37 °C), Min:97.3 °F (36.3 °C), Max:100.2 °F (37.9 °C)    Temp:  [97.3 °F (36.3 °C)-100.2 °F (37.9 °C)] 97.3 °F (36.3 °C)  HR:  [68-83] 68  Resp:  [18-20] 20  BP: ()/(35-51) 94/42  SpO2:  [85 %-95 %] 94 %  Body mass index is 64.94 kg/m².     Input and Output Summary (last 24 hours):     Intake/Output Summary (Last 24 hours) at 2024 1314  Last data filed at 2024 2331  Gross per 24 hour   Intake --   Output 100 ml   Net -100 ml       Physical Exam:   Physical Exam  Constitutional:       Appearance:  He is obese.   HENT:      Head: Normocephalic.   Cardiovascular:      Rate and Rhythm: Normal rate.      Pulses: Normal pulses.      Comments: Right IJ nontunneled hemodialysis catheter in use for hemodialysis  Pulmonary:      Effort: Pulmonary effort is normal.   Abdominal:      General: Bowel sounds are normal.   Musculoskeletal:         General: Tenderness present.      Right lower leg: Edema present.      Left lower leg: Swelling present. Edema present.      Comments: Lymphedema/cellulitis   Skin:     General: Skin is warm.      Capillary Refill: Capillary refill takes less than 2 seconds.      Findings: Erythema present.      Comments: Left lower extremity   Neurological:      Mental Status: He is alert and oriented to person, place, and time.   Psychiatric:         Behavior: Behavior normal.    Additional Data:     Labs:  Results from last 7 days   Lab Units 06/09/24  0450 06/06/24  0312 06/05/24  0619   WBC Thousand/uL 13.08*   < > 9.15   HEMOGLOBIN g/dL 6.7*   < > 8.5*   HEMATOCRIT % 21.2*   < > 27.0*   PLATELETS Thousands/uL 281   < > 317   SEGS PCT %  --   --  80*   LYMPHO PCT %  --   --  9*   MONO PCT %  --   --  8   EOS PCT %  --   --  3    < > = values in this interval not displayed.     Results from last 7 days   Lab Units 06/09/24  0450 06/04/24  0821 06/03/24  0714   SODIUM mmol/L 132*   < > 138   POTASSIUM mmol/L 4.5   < > 3.8   CHLORIDE mmol/L 97   < > 104   CO2 mmol/L 24   < > 21   BUN mg/dL 71*   < > 62*   CREATININE mg/dL 9.70*   < > 6.40*   ANION GAP mmol/L 11   < > 13   CALCIUM mg/dL 7.2*   < > 8.1*   ALBUMIN g/dL  --   --  3.0*   TOTAL BILIRUBIN mg/dL  --   --  0.35   ALK PHOS U/L  --   --  72   ALT U/L  --   --  9   AST U/L  --   --  13   GLUCOSE RANDOM mg/dL 122   < > 116    < > = values in this interval not displayed.     Results from last 7 days   Lab Units 06/03/24  0758   INR  1.10     Results from last 7 days   Lab Units 06/09/24  1126 06/09/24  0714 06/08/24  2123 06/08/24  1642  06/08/24  1108 06/08/24  0735 06/08/24  0108 06/07/24  2049 06/07/24  1656 06/07/24  1114 06/07/24  0717 06/06/24  2113   POC GLUCOSE mg/dl 113 123 118 152* 106 116 120 84 96 87 86 151*         Results from last 7 days   Lab Units 06/09/24  0450 06/08/24  0558 06/07/24  1215   LACTIC ACID mmol/L  --   --  0.3*   PROCALCITONIN ng/ml 10.26* 7.47* 2.85*       Lines/Drains:  Invasive Devices       Peripheral Intravenous Line  Duration             Peripheral IV 06/03/24 Right Antecubital 6 days    Peripheral IV 06/04/24 Dorsal (posterior);Left Hand 5 days              Hemodialysis Catheter  Duration             HD Temporary Double Catheter 2 days              Drain  Duration             Urethral Catheter Non-latex 16 Fr. 5 days                  Urinary Catheter:  Goal for removal: Voiding trial when ambulation improves                    Imaging: No pertinent imaging reviewed.    Recent Cultures (last 7 days):   Results from last 7 days   Lab Units 06/07/24  1230 06/07/24  1200   BLOOD CULTURE  No Growth at 24 hrs. No Growth at 24 hrs.       Last 24 Hours Medication List:   Current Facility-Administered Medications   Medication Dose Route Frequency Provider Last Rate    acetaminophen  650 mg Oral Q6H PRN TOYA ChungNP      atorvastatin  80 mg Oral Daily PETRA Chung      bumetanide (BUMEX) 12.5 mg infusion 50 mL  0.5 mg/hr Intravenous Continuous PETRA Chung 0.5 mg/hr (06/09/24 0701)    cefazolin  1,000 mg Intravenous Q12H TOYA ChungNP 1,000 mg (06/09/24 0421)    Cholecalciferol  5,000 Units Oral Daily PETRA Chung      cyanocobalamin  1,000 mcg Oral Daily Laureen PETRA Singleton      heparin (porcine)  5,000 Units Subcutaneous Q8H [facility] PETRA Chung      HYDROmorphone  0.5 mg Intravenous Q3H PRN PETRA Chung      insulin glargine  10 Units Subcutaneous HS PETRA Chung      insulin lispro  1-6 Units  Subcutaneous HS Laureen Cuba, CRNP      insulin lispro  2-12 Units Subcutaneous TID AC Laureen Bustamantene, CRNP      iron sucrose  200 mg Intravenous Every Other Day Laureen Cuba, CRNP      nystatin  1 Application Topical BID Laureen Bustamantene, CRNP      ondansetron  4 mg Intravenous Q6H PRN Laureen Cuba, CRNP      oxyCODONE  5 mg Oral Q4H PRN Laureen Cuba, CRNP      sodium hypochlorite  1 Application Irrigation Daily Laureen Cuba, CRNP      vancomycin  10 mg/kg (Adjusted) Intravenous Once PRN Laureen Cuba, TOYANP          Today, Patient Was Seen By: PETRA Vásquez    **Please Note: This note may have been constructed using a voice recognition system.**

## 2024-06-09 NOTE — PROGRESS NOTES
Patient is transferred to ICU for close monitoring and higher level of care. Report given to Flower QUINN, escorted the patient to CT radiography and the to ICU, the patient remains in stable condition.

## 2024-06-09 NOTE — PLAN OF CARE
Received patient alert and oriented X3 lying flat in bed- patient on oxygen via NC @6L/min and sat low 80's- patient also noted with low BP- Hospitalist on call made aware and order albumin- order carried out. RT on duty made aware of patient oxygen level- came to unit and place patient on cpap- patient O2 sat remains on the 80's. Patient is now stepdown and will start IV Bumex.-   Problem: PAIN - ADULT  Goal: Verbalizes/displays adequate comfort level or baseline comfort level  Description: Interventions:  - Encourage patient to monitor pain and request assistance  - Assess pain using appropriate pain scale  - Administer analgesics based on type and severity of pain and evaluate response  - Implement non-pharmacological measures as appropriate and evaluate response  - Consider cultural and social influences on pain and pain management  - Notify physician/advanced practitioner if interventions unsuccessful or patient reports new pain  Outcome: Progressin     Problem: INFECTION - ADULT  Goal: Absence or prevention of progression during hospitalization  Description: INTERVENTIONS:  - Assess and monitor for signs and symptoms of infection  - Monitor lab/diagnostic results  - Monitor all insertion sites, i.e. indwelling lines, tubes, and drains  - [place] appropriate cooling/warming therapies per order  - Administer medications as ordered  - Instruct and encourage patient and family to use good hand hygiene technique  - Identify and instruct in appropriate isolation precautions for identified infection/condition  Outcome: Progressing     Problem: SAFETY ADULT  Goal: Patient will remain free of falls  Description: INTERVENTIONS:  - Educate patient/family on patient safety including physical limitations  - Instruct patient to call for assistance with activity   - Consult OT/PT to assist with strengthening/mobility   - Keep Call bell within reach  - Keep bed low and locked with side rails adjusted as appropriate  - Keep  care items and personal belongings within reach  - Initiate and maintain comfort rounds  - Make Fall Risk Sign visible to staff  - Apply yellow socks and bracelet for high fall risk patients  - Consider moving patient to room near nurses station  Outcome: Progressing     Problem: SKIN/TISSUE INTEGRITY - ADULT  Goal: Incision(s), wounds(s) or drain site(s) healing without S/S of infection  Description: INTERVENTIONS  - Assess and document dressing, incision, wound bed, drain sites and surrounding tissue  - Provide patient and family education  - Perform skin care/dressing changes  Outcome: Progressing

## 2024-06-09 NOTE — ASSESSMENT & PLAN NOTE
POA with creatinine of 6.40.    Creatinine: 8.01> 9.59  Unclear etiology of CLOVIS.  Likely multifactorial.    No evidence of hydronephrosis or obstruction on CT imaging.  Serum and urine immunofixation showing IgG kappa monoclonal band  Transition to IV Bumex drip 0.5mg/hr on 6/6/24  Plan for renal biopsy next Wednesday by IR. Hold aspirin until biopsy  Due to worsening creatinine and uremia, will place for HD tomorrow 6/7/24. IR consulted for HD catheter placement.  Nephrology following, input appreciated  Consult oncology  Monitor [facility]  6/7: Patient received first dialysis today  6/8: Dialyzed today  Creatinine was noted elevated at 9.70 with low blood pressure requiring critical care consult  Patient will be transferred to ICU for higher level of care and closer monitoring

## 2024-06-09 NOTE — CONSULTS
Vancomycin IV Pharmacy-to-Dose Consultation     Vancomycin has been discontinued.  Pharmacy will sign off.  Please contact or re-consult with questions.    Do Bessie Vu, Pharmacist

## 2024-06-09 NOTE — ASSESSMENT & PLAN NOTE
S/p stents in 2020  OP regimen consists of: aspirin and statin  Continue to hold aspirin for planned renal biopsy

## 2024-06-09 NOTE — ASSESSMENT & PLAN NOTE
Lab Results   Component Value Date    HGBA1C 6.6 (H) 04/29/2024     Recent Labs     06/08/24  1642 06/08/24  2123 06/09/24  0714 06/09/24  1126   POCGLU 152* 118 123 113       Blood Sugar Average: Last 72 hrs:  (P) [id number]    Amaryl held during his hospitalization.  Resume at discharge.    Continue Lantus 10 units at bedtime along with sliding scale insulin.    Continue diabetic diet.

## 2024-06-10 ENCOUNTER — APPOINTMENT (INPATIENT)
Dept: DIALYSIS | Facility: HOSPITAL | Age: 44
DRG: 673 | End: 2024-06-10
Attending: INTERNAL MEDICINE
Payer: COMMERCIAL

## 2024-06-10 ENCOUNTER — APPOINTMENT (INPATIENT)
Dept: RADIOLOGY | Facility: HOSPITAL | Age: 44
DRG: 673 | End: 2024-06-10
Payer: COMMERCIAL

## 2024-06-10 LAB
ANION GAP SERPL CALCULATED.3IONS-SCNC: 11 MMOL/L (ref 4–13)
ANION GAP SERPL CALCULATED.3IONS-SCNC: 9 MMOL/L (ref 4–13)
ANION GAP SERPL CALCULATED.3IONS-SCNC: 9 MMOL/L (ref 4–13)
ARTERIAL PATENCY WRIST A: YES
B2 MICROGLOB SERPL-MCNC: 16.6 MG/L (ref 0.6–2.4)
BASE EXCESS BLDA CALC-SCNC: -1.7 MMOL/L
BASOPHILS # BLD AUTO: 0.02 THOUSANDS/ÂΜL (ref 0–0.1)
BASOPHILS NFR BLD AUTO: 0 % (ref 0–1)
BODY TEMPERATURE: 97.9 DEGREES FEHRENHEIT
BUN SERPL-MCNC: 50 MG/DL (ref 5–25)
BUN SERPL-MCNC: 53 MG/DL (ref 5–25)
BUN SERPL-MCNC: 64 MG/DL (ref 5–25)
CA-I BLD-SCNC: 1.05 MMOL/L (ref 1.12–1.32)
CA-I BLD-SCNC: 1.08 MMOL/L (ref 1.12–1.32)
CA-I BLD-SCNC: 1.1 MMOL/L (ref 1.12–1.32)
CALCIUM SERPL-MCNC: 7.7 MG/DL (ref 8.4–10.2)
CALCIUM SERPL-MCNC: 7.8 MG/DL (ref 8.4–10.2)
CALCIUM SERPL-MCNC: 7.8 MG/DL (ref 8.4–10.2)
CHLORIDE SERPL-SCNC: 100 MMOL/L (ref 96–108)
CHLORIDE SERPL-SCNC: 99 MMOL/L (ref 96–108)
CHLORIDE SERPL-SCNC: 99 MMOL/L (ref 96–108)
CO2 SERPL-SCNC: 23 MMOL/L (ref 21–32)
CO2 SERPL-SCNC: 25 MMOL/L (ref 21–32)
CO2 SERPL-SCNC: 26 MMOL/L (ref 21–32)
CREAT SERPL-MCNC: 6.98 MG/DL (ref 0.6–1.3)
CREAT SERPL-MCNC: 7.62 MG/DL (ref 0.6–1.3)
CREAT SERPL-MCNC: 8.31 MG/DL (ref 0.6–1.3)
EOSINOPHIL # BLD AUTO: 0.24 THOUSAND/ÂΜL (ref 0–0.61)
EOSINOPHIL NFR BLD AUTO: 2 % (ref 0–6)
ERYTHROCYTE [DISTWIDTH] IN BLOOD BY AUTOMATED COUNT: 15 % (ref 11.6–15.1)
EST. AVERAGE GLUCOSE BLD GHB EST-MCNC: 117 MG/DL
GFR SERPL CREATININE-BSD FRML MDRD: 7 ML/MIN/1.73SQ M
GFR SERPL CREATININE-BSD FRML MDRD: 7 ML/MIN/1.73SQ M
GFR SERPL CREATININE-BSD FRML MDRD: 8 ML/MIN/1.73SQ M
GLUCOSE SERPL-MCNC: 101 MG/DL (ref 65–140)
GLUCOSE SERPL-MCNC: 109 MG/DL (ref 65–140)
GLUCOSE SERPL-MCNC: 80 MG/DL (ref 65–140)
GLUCOSE SERPL-MCNC: 83 MG/DL (ref 65–140)
GLUCOSE SERPL-MCNC: 88 MG/DL (ref 65–140)
GLUCOSE SERPL-MCNC: 93 MG/DL (ref 65–140)
GLUCOSE SERPL-MCNC: 98 MG/DL (ref 65–140)
HBA1C MFR BLD: 5.7 %
HCO3 BLDA-SCNC: 24.9 MMOL/L (ref 22–28)
HCT VFR BLD AUTO: 22.3 % (ref 36.5–49.3)
HGB BLD-MCNC: 6.9 G/DL (ref 12–17)
IMM GRANULOCYTES # BLD AUTO: 0.08 THOUSAND/UL (ref 0–0.2)
IMM GRANULOCYTES NFR BLD AUTO: 1 % (ref 0–2)
LYMPHOCYTES # BLD AUTO: 0.51 THOUSANDS/ÂΜL (ref 0.6–4.47)
LYMPHOCYTES NFR BLD AUTO: 4 % (ref 14–44)
MAGNESIUM SERPL-MCNC: 1.9 MG/DL (ref 1.9–2.7)
MAGNESIUM SERPL-MCNC: 2 MG/DL (ref 1.9–2.7)
MAGNESIUM SERPL-MCNC: 2 MG/DL (ref 1.9–2.7)
MCH RBC QN AUTO: 28 PG (ref 26.8–34.3)
MCHC RBC AUTO-ENTMCNC: 30.9 G/DL (ref 31.4–37.4)
MCV RBC AUTO: 91 FL (ref 82–98)
MONOCYTES # BLD AUTO: 1.23 THOUSAND/ÂΜL (ref 0.17–1.22)
MONOCYTES NFR BLD AUTO: 9 % (ref 4–12)
NASAL CANNULA: 5
NEUTROPHILS # BLD AUTO: 11.14 THOUSANDS/ÂΜL (ref 1.85–7.62)
NEUTS SEG NFR BLD AUTO: 84 % (ref 43–75)
NRBC BLD AUTO-RTO: 0 /100 WBCS
O2 CT BLDA-SCNC: 10.6 ML/DL (ref 16–23)
OXYHGB MFR BLDA: 94.1 % (ref 94–97)
PCO2 BLDA: 52.4 MM HG (ref 36–44)
PCO2 TEMP ADJ BLDA: 51.5 MM HG (ref 36–44)
PH BLD: 7.3 [PH] (ref 7.35–7.45)
PH BLDA: 7.29 [PH] (ref 7.35–7.45)
PHOSPHATE SERPL-MCNC: 6.3 MG/DL (ref 2.7–4.5)
PHOSPHATE SERPL-MCNC: 6.9 MG/DL (ref 2.7–4.5)
PHOSPHATE SERPL-MCNC: 7.7 MG/DL (ref 2.7–4.5)
PLATELET # BLD AUTO: 267 THOUSANDS/UL (ref 149–390)
PMV BLD AUTO: 8.8 FL (ref 8.9–12.7)
PO2 BLD: 79.2 MM HG (ref 75–129)
PO2 BLDA: 81.3 MM HG (ref 75–129)
POTASSIUM SERPL-SCNC: 4.5 MMOL/L (ref 3.5–5.3)
POTASSIUM SERPL-SCNC: 4.7 MMOL/L (ref 3.5–5.3)
POTASSIUM SERPL-SCNC: 4.7 MMOL/L (ref 3.5–5.3)
RBC # BLD AUTO: 2.46 MILLION/UL (ref 3.88–5.62)
SODIUM SERPL-SCNC: 133 MMOL/L (ref 135–147)
SODIUM SERPL-SCNC: 133 MMOL/L (ref 135–147)
SODIUM SERPL-SCNC: 135 MMOL/L (ref 135–147)
SPECIMEN SOURCE: ABNORMAL
WBC # BLD AUTO: 13.22 THOUSAND/UL (ref 4.31–10.16)

## 2024-06-10 PROCEDURE — 36600 WITHDRAWAL OF ARTERIAL BLOOD: CPT

## 2024-06-10 PROCEDURE — 83735 ASSAY OF MAGNESIUM: CPT | Performed by: STUDENT IN AN ORGANIZED HEALTH CARE EDUCATION/TRAINING PROGRAM

## 2024-06-10 PROCEDURE — 99232 SBSQ HOSP IP/OBS MODERATE 35: CPT | Performed by: INTERNAL MEDICINE

## 2024-06-10 PROCEDURE — 3044F HG A1C LEVEL LT 7.0%: CPT | Performed by: SPECIALIST

## 2024-06-10 PROCEDURE — 82330 ASSAY OF CALCIUM: CPT | Performed by: STUDENT IN AN ORGANIZED HEALTH CARE EDUCATION/TRAINING PROGRAM

## 2024-06-10 PROCEDURE — 80048 BASIC METABOLIC PNL TOTAL CA: CPT | Performed by: INTERNAL MEDICINE

## 2024-06-10 PROCEDURE — 82805 BLOOD GASES W/O2 SATURATION: CPT

## 2024-06-10 PROCEDURE — 36556 INSERT NON-TUNNEL CV CATH: CPT

## 2024-06-10 PROCEDURE — 84100 ASSAY OF PHOSPHORUS: CPT | Performed by: STUDENT IN AN ORGANIZED HEALTH CARE EDUCATION/TRAINING PROGRAM

## 2024-06-10 PROCEDURE — 85025 COMPLETE CBC W/AUTO DIFF WBC: CPT | Performed by: NURSE PRACTITIONER

## 2024-06-10 PROCEDURE — 02HV33Z INSERTION OF INFUSION DEVICE INTO SUPERIOR VENA CAVA, PERCUTANEOUS APPROACH: ICD-10-PCS | Performed by: ANESTHESIOLOGY

## 2024-06-10 PROCEDURE — 94760 N-INVAS EAR/PLS OXIMETRY 1: CPT

## 2024-06-10 PROCEDURE — 82948 REAGENT STRIP/BLOOD GLUCOSE: CPT

## 2024-06-10 PROCEDURE — 94660 CPAP INITIATION&MGMT: CPT

## 2024-06-10 PROCEDURE — 71045 X-RAY EXAM CHEST 1 VIEW: CPT

## 2024-06-10 PROCEDURE — 99233 SBSQ HOSP IP/OBS HIGH 50: CPT | Performed by: STUDENT IN AN ORGANIZED HEALTH CARE EDUCATION/TRAINING PROGRAM

## 2024-06-10 PROCEDURE — 90945 DIALYSIS ONE EVALUATION: CPT

## 2024-06-10 PROCEDURE — 82595 ASSAY OF CRYOGLOBULIN: CPT | Performed by: INTERNAL MEDICINE

## 2024-06-10 PROCEDURE — 94762 N-INVAS EAR/PLS OXIMTRY CONT: CPT

## 2024-06-10 PROCEDURE — NC001 PR NO CHARGE

## 2024-06-10 PROCEDURE — 99233 SBSQ HOSP IP/OBS HIGH 50: CPT | Performed by: ANESTHESIOLOGY

## 2024-06-10 PROCEDURE — 99233 SBSQ HOSP IP/OBS HIGH 50: CPT | Performed by: INTERNAL MEDICINE

## 2024-06-10 RX ORDER — MAGNESIUM SULFATE 1 G/100ML
INJECTION INTRAVENOUS
Status: DISPENSED
Start: 2024-06-10 | End: 2024-06-10

## 2024-06-10 RX ORDER — CALCIUM GLUCONATE 20 MG/ML
2 INJECTION, SOLUTION INTRAVENOUS ONCE
Status: COMPLETED | OUTPATIENT
Start: 2024-06-10 | End: 2024-06-10

## 2024-06-10 RX ORDER — ACETAMINOPHEN 325 MG/1
650 TABLET ORAL EVERY 4 HOURS PRN
Status: DISCONTINUED | OUTPATIENT
Start: 2024-06-10 | End: 2024-06-11

## 2024-06-10 RX ORDER — POLYETHYLENE GLYCOL 3350 17 G/17G
17 POWDER, FOR SOLUTION ORAL DAILY PRN
Status: DISCONTINUED | OUTPATIENT
Start: 2024-06-10 | End: 2024-06-26 | Stop reason: HOSPADM

## 2024-06-10 RX ORDER — DOCUSATE SODIUM 100 MG/1
100 CAPSULE, LIQUID FILLED ORAL 2 TIMES DAILY
Status: DISCONTINUED | OUTPATIENT
Start: 2024-06-10 | End: 2024-06-26 | Stop reason: HOSPADM

## 2024-06-10 RX ORDER — SENNOSIDES 8.6 MG
1 TABLET ORAL
Status: DISCONTINUED | OUTPATIENT
Start: 2024-06-10 | End: 2024-06-26 | Stop reason: HOSPADM

## 2024-06-10 RX ORDER — FENTANYL CITRATE 50 UG/ML
50 INJECTION, SOLUTION INTRAMUSCULAR; INTRAVENOUS ONCE
Status: COMPLETED | OUTPATIENT
Start: 2024-06-10 | End: 2024-06-10

## 2024-06-10 RX ORDER — MIDODRINE HYDROCHLORIDE 5 MG/1
5 TABLET ORAL
Status: DISCONTINUED | OUTPATIENT
Start: 2024-06-10 | End: 2024-06-25

## 2024-06-10 RX ORDER — MAGNESIUM SULFATE 1 G/100ML
1 INJECTION INTRAVENOUS ONCE
Status: COMPLETED | OUTPATIENT
Start: 2024-06-10 | End: 2024-06-10

## 2024-06-10 RX ORDER — ALBUMIN (HUMAN) 12.5 G/50ML
12.5 SOLUTION INTRAVENOUS ONCE AS NEEDED
Status: COMPLETED | OUTPATIENT
Start: 2024-06-10 | End: 2024-06-10

## 2024-06-10 RX ADMIN — CALCIUM GLUCONATE 2 G: 20 INJECTION, SOLUTION INTRAVENOUS at 01:34

## 2024-06-10 RX ADMIN — MIDODRINE HYDROCHLORIDE 5 MG: 5 TABLET ORAL at 19:50

## 2024-06-10 RX ADMIN — Medication 5000 UNITS: at 08:28

## 2024-06-10 RX ADMIN — PIPERACILLIN AND TAZOBACTAM 4.5 G: 4; .5 INJECTION, POWDER, FOR SOLUTION INTRAVENOUS at 00:27

## 2024-06-10 RX ADMIN — OXYCODONE HYDROCHLORIDE 5 MG: 5 TABLET ORAL at 06:20

## 2024-06-10 RX ADMIN — NYSTATIN 1 APPLICATION: 100000 POWDER TOPICAL at 08:36

## 2024-06-10 RX ADMIN — FENTANYL CITRATE 50 MCG: 50 INJECTION, SOLUTION INTRAMUSCULAR; INTRAVENOUS at 16:36

## 2024-06-10 RX ADMIN — HEPARIN SODIUM 5000 UNITS: 5000 INJECTION, SOLUTION INTRAVENOUS; SUBCUTANEOUS at 06:14

## 2024-06-10 RX ADMIN — SENNOSIDES 8.6 MG: 8.6 TABLET, FILM COATED ORAL at 22:59

## 2024-06-10 RX ADMIN — DOCUSATE SODIUM 100 MG: 100 CAPSULE, LIQUID FILLED ORAL at 18:19

## 2024-06-10 RX ADMIN — DOCUSATE SODIUM 100 MG: 100 CAPSULE, LIQUID FILLED ORAL at 08:28

## 2024-06-10 RX ADMIN — ALBUMIN (HUMAN) 12.5 G: 0.25 INJECTION, SOLUTION INTRAVENOUS at 20:29

## 2024-06-10 RX ADMIN — MIDODRINE HYDROCHLORIDE 5 MG: 5 TABLET ORAL at 14:30

## 2024-06-10 RX ADMIN — PIPERACILLIN AND TAZOBACTAM 4.5 G: 4; .5 INJECTION, POWDER, FOR SOLUTION INTRAVENOUS at 12:50

## 2024-06-10 RX ADMIN — VANCOMYCIN HYDROCHLORIDE 2000 MG: 10 INJECTION, POWDER, LYOPHILIZED, FOR SOLUTION INTRAVENOUS at 22:59

## 2024-06-10 RX ADMIN — HYDROMORPHONE HYDROCHLORIDE 0.5 MG: 1 INJECTION, SOLUTION INTRAMUSCULAR; INTRAVENOUS; SUBCUTANEOUS at 08:42

## 2024-06-10 RX ADMIN — MAGNESIUM SULFATE HEPTAHYDRATE 1 G: 1 INJECTION, SOLUTION INTRAVENOUS at 03:00

## 2024-06-10 RX ADMIN — NYSTATIN 1 APPLICATION: 100000 POWDER TOPICAL at 18:19

## 2024-06-10 RX ADMIN — ATORVASTATIN CALCIUM 80 MG: 80 TABLET, FILM COATED ORAL at 08:28

## 2024-06-10 RX ADMIN — HEPARIN SODIUM 5000 UNITS: 5000 INJECTION, SOLUTION INTRAVENOUS; SUBCUTANEOUS at 22:59

## 2024-06-10 RX ADMIN — HEPARIN SODIUM 5000 UNITS: 5000 INJECTION, SOLUTION INTRAVENOUS; SUBCUTANEOUS at 13:27

## 2024-06-10 RX ADMIN — Medication 15000 ML: at 03:58

## 2024-06-10 RX ADMIN — CYANOCOBALAMIN TAB 500 MCG 1000 MCG: 500 TAB at 08:28

## 2024-06-10 NOTE — PROGRESS NOTES
NEPHROLOGY PROGRESS NOTE   Joaquin Frankel 44 y.o. male MRN: [id number]  Unit/Bed#: ICU 11 Encounter: [id number]  Reason for Consult: CLOVIS    ASSESSMENT AND PLAN:  43 yo man with PMH of morbid obesity, lower extremity lymphedema, diabetes, hypertension, CAD p/w shortness of breath for the last 2 weeks.  Nephrology is consulted for management of CLOVIS      PLAN:    #Non-Oliguric KDIGO CLOVIS stage 3 on CKD G3aA3  Etiology: High suspicious of MGR S with possible multiple myeloma vs diabetic glomerulopathy however rapid progression it is very uncommon  Baseline creatinine 1.3 to 1.4 mg/dL with previous episodes of CLOVIS  Current creatinine: 6.98 mg/dL, dialysis dependent  Peak creatinine: 10.42  UA: Hematuria, proteinuria  [id number]  Renal imaging : No hydronephrosis  Kidney biopsy requested by Dr. Reyes last week the patient is scheduled for this week as per IR  Treatment:  Stable to tolerate intermittent hemodialysis.  CVVH discontinue due to clotting issues  If patient cannot tolerate intermittent hemodialysis we will have to go back to CVVHD  Maintain MAP:  Over 65 mmHg if possible/avoid hypoperfusion:  Hold parameters on blood pressure medications  Avoid nephrotoxic agents such as NSAIDs, and IV contrast if possible. 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  Hemoglobin needs to be optimized for biopsy    # Monoclonal gammopathy  Immunofixation consistent with IgG Kappa  Multiple myeloma?  Patient seen by Dr. Fernández this morning, no bone marrow biopsy for now  Patient is sick and very obese.  Kidney biopsy will be challenging for IR    #DM  HbA1c 5.7  Advised to maintain a good DM control to prevent progression of CKD   Maintain healthy diet (vegetables, fruits, whole grains, nonfat or low fat)  Weight loss  Physical activity (5 to 10 minutes to start the increase to 30 min a day)      # Sepsis  Possible cellulitis  Antibiotics as per primary team    The highlighted and/or bolded points in my assessment, plan, and disposition were discussed with the primary team and they agree with those points and the plan.  Previous records were personally reviewed by me to obtain a baseline creatinine.   The images (CXR) were personally reviewed by me in PACS      SUBJECTIVE:  Patient seen and examined at bedside.  Patient is lethargic, on nasal cannula oxygen.  CVVH discontinued overnight due to clotting.   on the monitor    OBJECTIVE:  Current Weight: Weight - Scale: (!) 186 kg (410 lb 0.9 oz)  Vitals:    06/10/24 0900   BP: (!) 92/49   Pulse: 71   Resp: 21   Temp:    SpO2: 94%       Intake/Output Summary (Last 24 hours) at 6/10/2024 0948  Last data filed at 6/10/2024 0800  Gross per 24 hour   Intake 945.46 ml   Output 1474 ml   Net -528.54 ml     Wt Readings from Last 3 Encounters:   06/10/24 (!) 186 kg (410 lb 0.9 oz)   05/30/24 (!) 187 kg (413 lb)   05/22/24 (!) 189 kg (417 lb)     Temp Readings from Last 3 Encounters:   06/10/24 97.7 °F (36.5 °C) (Oral)   05/30/24 (!) 97 °F (36.1 °C) (Core)   05/16/24 (!) 97 °F (36.1 °C) (Core)     BP Readings from Last 3 Encounters:   06/10/24 (!) 92/49   05/22/24 134/74   05/16/24 144/72     Pulse Readings from Last 3 Encounters:   06/10/24 71    05/22/24 76   05/16/24 59      General:  no acute distress at this time. obese  Skin:  No acute rash  Eyes:  No scleral icterus and noninjected  ENT:  mucous membranes moist  Neck:  no carotid bruits  Chest:  Clear to auscultation percussion, good respiratory effort, no use of accessory respiratory muscles  CVS:  Regular rate and rhythm without rub   Abdomen:  soft and nontender   Extremities: lymphedema   Neuro:  No gross focality  Psych:  Alert , cooperative      Medications:    Current Facility-Administered Medications:     acetaminophen (TYLENOL) tablet 650 mg, 650 mg, Oral, Q6H PRN, TOYA ChungNP, 650 mg at 06/07/24 1101    albumin human (FLEXBUMIN) 25 % injection 12.5 g, 12.5 g, Intravenous, Once PRN, Joselyn Reyes Bahamonde, MD    atorvastatin (LIPITOR) tablet 80 mg, 80 mg, Oral, Daily, TOYA ChungNP, 80 mg at 06/10/24 0828    Cholecalciferol (VITAMIN D3) tablet 5,000 Units, 5,000 Units, Oral, Daily, TOYA ChungNP, 5,000 Units at 06/10/24 0828    cyanocobalamin (VITAMIN B-12) tablet 1,000 mcg, 1,000 mcg, Oral, Daily, PETRA Chung, 1,000 mcg at 06/10/24 0828    docusate sodium (COLACE) capsule 100 mg, 100 mg, Oral, BID, TOYA ChungNP, 100 mg at 06/10/24 0828    heparin (porcine) subcutaneous injection 5,000 Units, 5,000 Units, Subcutaneous, Q8H SHAZIA, PETRA Chung, 5,000 Units at 06/10/24 0614    HYDROmorphone (DILAUDID) injection 0.5 mg, 0.5 mg, Intravenous, Q3H PRN, PETRA Chung, 0.5 mg at 06/10/24 0842    insulin lispro (HumALOG/ADMELOG) 100 units/mL subcutaneous injection 1-6 Units, 1-6 Units, Subcutaneous, HS, PETRA Chung, 1 Units at 06/06/24 2150    insulin lispro (HumALOG/ADMELOG) 100 units/mL subcutaneous injection 2-12 Units, 2-12 Units, Subcutaneous, TID AC, 2 Units at 06/08/24 1720 **AND** Fingerstick Glucose (POCT), , , TID LUPE, PETRA Chung    midodrine (PROAMATINE)  tablet 5 mg, 5 mg, Oral, Before Dialysis, Laureen Alexander Rosa Elena, CRNP    nystatin (MYCOSTATIN) powder 1 Application, 1 Application, Topical, BID, Laureen Spencer Rosa Elena, CRNP, 1 Application at 06/10/24 0836    ondansetron (ZOFRAN) injection 4 mg, 4 mg, Intravenous, Q6H PRN, Laureen Alexander Rosa Elena, CRNP, 4 mg at 06/09/24 1000    oxyCODONE (ROXICODONE) IR tablet 5 mg, 5 mg, Oral, Q4H PRN, Laureen Spencer Rosa Elena, CRNP, 5 mg at 06/10/24 0620    [COMPLETED] piperacillin-tazobactam (ZOSYN) 4.5 g in sodium chloride 0.9 % 100 mL IV LOADING DOSE, 4.5 g, Intravenous, Once, Stopped at 06/09/24 1900 **FOLLOWED BY** piperacillin-tazobactam (ZOSYN) 4.5 g in sodium chloride 0.9 % 100 mL IVPB (EXTENDED INFUSION), 4.5 g, Intravenous, Q12H, Laureen Israelbles Rosa Elena, CRNP, Stopped at 06/10/24 0427    polyethylene glycol (MIRALAX) packet 17 g, 17 g, Oral, Daily PRN, Laureen Alexander Rosa Elena, CRNP    senna (SENOKOT) tablet 8.6 mg, 1 tablet, Oral, HS, Laureen Spencer Rosa Elena, CRNP    sodium hypochlorite (DAKIN'S HALF-STRENGTH) 0.25 percent topical solution 1 Application, 1 Application, Irrigation, Daily, Laureen Spencer Rosa Elena, CRNP, 1 Application at 06/09/24 1013    vancomycin (VANCOCIN) 2,750 mg in sodium chloride 0.9 % 500 mL IVPB, 15 mg/kg, Intravenous, Q12H, Laureen Palmer Rosa Elena, CRNP    Laboratory Results:  Results from last 7 days   Lab Units 06/10/24  0607 06/10/24  0024 06/09/24  1954 06/09/24  1538 06/09/24  0450 06/08/24  0558 06/07/24  0846 06/06/24  0312 06/05/24  0619 06/04/24  0836   WBC Thousand/uL 13.22*  --   --   --  13.08* 14.64* 14.53* 9.66 9.15 8.60   HEMOGLOBIN g/dL 6.9*  --   --  7.2* 6.7* 8.0* 7.7* 8.0* 8.5* 8.0*   HEMATOCRIT % 22.3*  --   --   --  21.2* 26.0* 24.5* 25.0* 27.0* 25.0*   PLATELETS Thousands/uL 267  --   --   --  281 309 316 306 317 319   SODIUM mmol/L 135 133* 134* 134* 132* 132* 133* 133* 137  --    POTASSIUM mmol/L 4.7 4.5 4.5 4.7 4.5 4.9 4.5 4.1 4.2  --    CHLORIDE mmol/L 100 99 98 96 97 98 100 101 103  --     CO2 mmol/L 26 23 25 25 24 18* 20* 20* 24  --    BUN mg/dL 50* 64* 66* 73* 71* 77* 91* 73* 71*  --    CREATININE mg/dL 6.98* 8.31* 9.12* 10.42* 9.70* 9.55* 9.59* 8.10* 7.28*  --    CALCIUM mg/dL 7.8* 7.7* 7.1* 7.1* 7.2* 7.2* 7.2* 7.6* 7.8*  --    MAGNESIUM mg/dL 2.0 1.9 1.8* 1.9 1.6* 1.7*  --   --   --   --    PHOSPHORUS mg/dL 6.3* 7.7* 8.5* 10.2*  --   --   --   --   --   --        CT lower extremity wo contrast left   Final Result by Jimbo Camacho MD (06/09 2045)      Extensive subcutaneous edema and skin thickening as described above. This may either reflect bland edema or extensive cellulitis. A well-defined rim-enhancing fluid collection not seen to suggest a discrete abscess. No soft tissue gas.      Osteoarthrosis as above. No osseous erosion.         Workstation performed: [id number]         CT chest abdomen pelvis wo contrast   Final Result by Allen Mcgowan MD (06/10 0804)   1. Small left greater than right basilar effusions with associated compressive atelectasis. Lungs otherwise clear.   2. Periaortic retroperitoneal, left greater than right iliac chain and inguinal lymph nodes identified, stable and nonspecific.   3. No acute findings.                     Workstation performed: [id number]         XR chest portable   Final Result by Danyell Ford MD (06/09 2240)      Mild pulmonary venous congestion.      Nothing to suggest pneumonia.            Workstation performed: [id number]         IR temporary dialysis catheter placement   Final Result by Radames Liu MD (06/07 7287)   1. Uncomplicated nontunneled/temporary dialysis catheter placement (14 [place] by 24 cm). Catheter tip terminates in the right atrium. The catheter is ready for immediate use.      2. Impaired hemostasis was discussed with Dr. Timmons of Nephrology. DDAVP will be administered to help mitigate rebleeding risk.      Workstation performed: [id number]         CT chest abdomen pelvis wo contrast   Final Result by  "Margoth Arango MD (06/03 2601)      No acute pulmonary pathology.      No acute abdominal or pelvic pathology within limitation of a noncontrast study. In particular, no hydronephrosis.      Left inguinal and left external iliac lymphadenopathy, increased from May 2022. This may be reactive to a process in the left lower extremity or in the partially imaged pannus.      Additional findings as above.                  Workstation performed: [id number]         NM lung ventilation / perfusion   Final Result by Daniel Ackerman MD (06/03 1412)      The probability for pulmonary embolus is low.      Workstation performed: [id number]         XR chest 1 view portable   ED Interpretation by Kit Antoine MD (06/03 0753)   Mild pulmonary edema right worse than left, no additional acute cardiopulmonary abnormality      Final Result by Miah Toledo MD (06/03 0841)      No acute cardiopulmonary disease.            Workstation performed: [id number]          VAS VENOUS DUPLEX - LOWER LIMB BILATERAL    (Results Pending)   IR biopsy kidney random    (Results Pending)       Portions of the record may have been created with voice recognition software. Occasional wrong word or \"sound a like\" substitutions may have occurred due to the inherent limitations of voice recognition software. Read the chart carefully and recognize, using context, where substitutions have occurred.    "

## 2024-06-10 NOTE — UTILIZATION REVIEW
Continued Stay Review    Date: 06/10/2024                          Current Patient Class: Inpatient  Current Level of Care: Critical Care    HPI:44 y.o. male initially admitted on 06/03/2024     Assessment/Plan: CLOVIS.  Cardio-Pulmonary monitoring.  Patient was started on CVVH last evening and has been have 50 ml/hr of fluid removed.  Monitor strict I&O.  CRRT filter clotted this morning. Trial of iHD today. Renal biopsy planned this week. Bumetadine infusion discontinued yesterday in setting of tinnitus.  Continue vancomycin. Continue zosyn. Monitor blood cultures obtained 6/7. Follow-up left lower extremity duplex. ASA on hold for renal biopsy this week.   Temporary HD catheter 6/6. PIV x 2. Discontinue Robbins 6/4.   Monitor & trend Cr    Vital Signs:   Date/Time Temp Pulse Resp BP MAP (mmHg) SpO2 Calculated FIO2 (%) - Nasal Cannula O2 Flow Rate (L/min) Nasal Cannula O2 Flow Rate (L/min) O2 Device O2 Interface Device Patient Position - Orthostatic VS   06/10/24 0900 -- 71 21 92/49 Abnormal  67 94 % -- -- -- -- -- --   06/10/24 0800 97.7 °F (36.5 °C) 74 18 107/53 77 97 % 44 -- 6 L/min Mid flow nasal cannula -- Lying   06/10/24 0757 97 °F (36.1 °C) Abnormal  -- -- -- -- -- -- -- -- -- -- --   06/10/24 0747 97.9 °F (36.6 °C) 73 20 -- -- 97 % 44 -- 6 L/min Mid flow nasal cannula -- --   06/10/24 0700 -- 79 21 112/82 92 96 % -- -- -- -- -- --   06/10/24 0600 -- 66 14 100/49 Abnormal  70 100 % -- -- -- -- -- --   06/10/24 0500 -- 64 20 104/53 72 99 % -- -- -- -- -- --   06/10/24 0400 -- 71 16 112/56 80 99 % -- -- -- -- -- --   06/10/24 0300 -- 74 17 129/60 87 100 % -- -- -- -- Face mask --   06/10/24 0200 -- 68 13 95/53 70 99 % -- -- -- -- -- --   06/10/24 0100 -- 66 16 96/51 70 99 % -- -- -- -- -- --   06/10/24 0000 -- 69 20 108/50 72 98 % -- -- -- -- -- --     Date and Time Eye Opening Best Verbal Response Best Motor Response [place] Coma Scale Score   06/10/24 0756 4 5 6 15   06/10/24 0400 4 5 6 15   06/10/24 0000 4 5 6  15     Pertinent Labs/Diagnostic Results:     Results from last 7 days   Lab Units 06/10/24  0607 06/09/24  1538 06/09/24  0450 06/08/24  0558 06/07/24  0846 06/06/24  0312 06/05/24  0619   WBC Thousand/uL 13.22*  --  13.08* 14.64* 14.53* 9.66 9.15   HEMOGLOBIN g/dL 6.9* 7.2* 6.7* 8.0* 7.7* 8.0* 8.5*   HEMATOCRIT % 22.3*  --  21.2* 26.0* 24.5* 25.0* 27.0*   PLATELETS Thousands/uL 267  --  281 309 316 306 317   TOTAL NEUT ABS Thousands/µL 11.14*  --   --   --   --   --  7.27     Results from last 7 days   Lab Units 06/10/24  0607 06/10/24  0024 06/09/24  1954 06/09/24  1538 06/09/24  0450   SODIUM mmol/L 135 133* 134* 134* 132*   POTASSIUM mmol/L 4.7 4.5 4.5 4.7 4.5   CHLORIDE mmol/L 100 99 98 96 97   CO2 mmol/L 26 23 25 25 24   ANION GAP mmol/L 9 11 11 13 11   BUN mg/dL 50* 64* 66* 73* 71*   CREATININE mg/dL 6.98* 8.31* 9.12* 10.42* 9.70*   EGFR ml/min/1.73sq m 8 7 6 5 5   CALCIUM mg/dL 7.8* 7.7* 7.1* 7.1* 7.2*   CALCIUM, IONIZED mmol/L 1.10* 1.05* 0.99* 0.97*  --    MAGNESIUM mg/dL 2.0 1.9 1.8* 1.9 1.6*   PHOSPHORUS mg/dL 6.3* 7.7* 8.5* 10.2*  --      Results from last 7 days   Lab Units 06/03/24  1606   TOTAL PROTEIN g/dL 6.5     Results from last 7 days   Lab Units 06/10/24  0717 06/09/24  2020 06/09/24  1603 06/09/24  1126 06/09/24  0714 06/08/24  2123 06/08/24  1642 06/08/24  1108 06/08/24  0735 06/08/24  0108 06/07/24  2049 06/07/24  1656   POC GLUCOSE mg/dl 93 119 99 113 123 118 152* 106 116 120 84 96     Results from last 7 days   Lab Units 06/10/24  0607 06/10/24  0024 06/09/24  1954 06/09/24  1538 06/09/24  0450 06/08/24  0558 06/07/24  0846 06/06/24  0312 06/05/24  0619 06/04/24  0821   GLUCOSE RANDOM mg/dL 101 109 110 104 122 121 88 74 77 82     Results from last 7 days   Lab Units 06/09/24  1538   HEMOGLOBIN A1C % 5.7*   EAG mg/dl 117     Results from last 7 days   Lab Units 06/09/24  1538   PH MAGUE  7.197*   PCO2 MAGUE mm Hg 63.9*   PO2 MAGUE mm Hg 84.9*   HCO3 MAGUE mmol/L 24.2   BASE EXC MAGUE mmol/L -4.0    O2 CONTENT MAGUE ml/dL 10.9   O2 HGB, VENOUS % 92.3*     Results from last 7 days   Lab Units 06/03/24  1240   CK TOTAL U/L 267     Results from last 7 days   Lab Units 06/03/24  1606   HS TNI 4HR ng/L 25   HSTNI D4 ng/L 2     Results from last 7 days   Lab Units 06/03/24  1745   PTT seconds 37     Results from last 7 days   Lab Units 06/09/24  0450 06/08/24  0558 06/07/24  1215   PROCALCITONIN ng/ml 10.26* 7.47* 2.85*     Results from last 7 days   Lab Units 06/09/24  1538 06/07/24  1215   LACTIC ACID mmol/L 0.4* 0.3*     Results from last 7 days   Lab Units 06/08/24  0558 06/03/24  1240   BNP pg/mL 288* 233*     Results from last 7 days   Lab Units 06/04/24  0821   FERRITIN ng/mL 69   IRON SATURATION % 17   IRON ug/dL 38*   TIBC ug/dL 226*     Results from last 7 days   Lab Units 06/07/24  0846   HEP B S AG  Non-reactive   HEP C AB  Non-reactive   HEP B C IGM  Non-reactive   HEP B C TOTAL AB  Non-reactive     Results from last 7 days   Lab Units 06/07/24  0846   CRP mg/L 306.3*     Results from last 7 days   Lab Units 06/09/24  1539 06/03/24  1733   CLARITY UA   --  Clear   COLOR UA   --  Yellow   SPEC GRAV UA   --  1.025   PH UA   --  6.0   GLUCOSE UA mg/dl  --  Negative   KETONES UA mg/dl  --  Negative   BLOOD UA   --  Large*   PROTEIN UA mg/dl  --  300 (3+)*   NITRITE UA   --  Negative   BILIRUBIN UA   --  Negative   UROBILINOGEN UA (BE) mg/dl  --  <2.0   LEUKOCYTES UA   --  Negative   WBC UA /hpf  --  2-4   RBC UA /hpf  --  10-20*   BACTERIA UA /hpf  --  Moderate*   EPITHELIAL CELLS WET PREP /hpf  --  None Seen   CREATININE UR mg/dL 386.8  --      Results from last 7 days   Lab Units 06/07/24  1230 06/07/24  1200   BLOOD CULTURE  No Growth at 48 hrs. No Growth at 48 hrs.     Results from last 7 days   Lab Units 06/09/24  0450   VANCOMYCIN TR ug/mL 20.1*     Medications:   Scheduled Medications:  atorvastatin, 80 mg, Oral, Daily  Cholecalciferol, 5,000 Units, Oral, Daily  cyanocobalamin, 1,000 mcg, Oral,  Daily  docusate sodium, 100 mg, Oral, BID  heparin (porcine), 5,000 Units, Subcutaneous, Q8H SHAZIA  insulin lispro, 1-6 Units, Subcutaneous, HS  insulin lispro, 2-12 Units, Subcutaneous, TID AC  nystatin, 1 Application, Topical, BID  piperacillin-tazobactam, 4.5 g, Intravenous, Q12H  senna, 1 tablet, Oral, HS  sodium hypochlorite, 1 Application, Irrigation, Daily      Continuous IV Infusions:  norepinephrine (LEVOPHED) 8 mg (DOUBLE CONCENTRATION) IV in sodium chloride 0.9% 250 mL  Rate: 1.9-56.3 mL/hr Dose: 1-30 mcg/min  Freq: Titrated Route: IV  Last Dose: Stopped (06/09/24 1924)  Start: 06/09/24 1715 End: 06/10/24 0703  NxStage K 4/Ca 3 dialysis solution (RFP-401) 20,000 mL  Dose: 82332 mL  Freq: Continuous Route: DIALYSIS  Start: 06/09/24 1645 End: 06/10/24 0803    PRN Meds:  acetaminophen, 650 mg, Oral, Q6H PRN  Albumin 25%, 12.5 g, Intravenous, Once PRN  HYDROmorphone, 0.5 mg, Intravenous, Q3H PRN   x 1 dose 6/10  ondansetron, 4 mg, Intravenous, Q6H PRN  oxyCODONE, 5 mg, Oral, Q4H PRN   x 1 dose 6/10  polyethylene glycol, 17 g, Oral, Daily PRN        Discharge Plan: D    Network Utilization Review Department  ATTENTION: Please call with any questions or concerns to [phone] and carefully listen to the prompts so that you are directed to the right person. All voicemails are confidential.   For Discharge needs, contact Care Management DC Support Team at [phone] opt. 2  Send all requests for admission clinical reviews, approved or denied determinations and any other requests to dedicated fax number below belonging to the campus where the patient is receiving treatment. List of dedicated fax numbers for the Facilities:  FACILITY NAME UR FAX NUMBER   ADMISSION DENIALS (Administrative/Medical Necessity) [phone]   DISCHARGE SUPPORT TEAM (NETWORK) [phone]   PARENT CHILD HEALTH (Maternity/NICU/Pediatrics) [phone]   [facility] [phone]   [facility] [phone]   [facility] [phone]   [facility] [phone]   Critical access hospital [phone]   [facility] [phone]   [facility] [phone]   [facility] [phone]   [facility] [phone]   [facility] [phone]   [facility] [phone]   [facility] [phone]

## 2024-06-10 NOTE — PROCEDURES
Temporary HD Catheter    Date/Time: 6/10/2024 5:29 PM    Performed by: PETRA Chung  Authorized by: PETRA Chung    Patient location:  Bedside  Other Assisting Provider: No    Consent:     Consent obtained:  Written    Consent given by:  Patient    Risks discussed:  Arterial puncture, incorrect placement, nerve damage, pneumothorax, infection and bleeding    Alternatives discussed:  No treatment  Universal protocol:     Procedure explained and questions answered to patient or proxy's satisfaction: yes      Relevant documents present and verified: yes      Test results available and properly labeled: yes      Radiology Images displayed and confirmed.  If images not available, report reviewed: yes      Required blood products, implants, devices, and special equipment available: yes      Site/side marked: yes      Immediately prior to procedure, a time out was called: yes      Patient identity confirmed:  Verbally with patient and arm band  Pre-procedure details:     Hand hygiene: Hand hygiene performed prior to insertion      Sterile barrier technique: All elements of maximal sterile technique followed      Skin preparation:  ChloraPrep    Skin preparation agent: Skin preparation agent completely dried prior to procedure    Indications:     Central line indications: dialysis    Sedation:     Sedation type: 50 mcg fentanyl.  Procedure details:     Location:  Right internal jugular    Vessel type: vein      Laterality:  Right    Patient position:  Trendelenburg    Catheter length:  16 cm    Landmarks identified: yes      Ultrasound guidance: yes      Ultrasound image availability:  Images available in PACS    Sterile ultrasound techniques: Sterile gel and sterile probe covers were used      Number of attempts:  1  Post-procedure details:     Post-procedure:  Dressing applied    Assessment:  Blood return through all ports, free fluid flow, no pneumothorax on x-ray and placement verified by  x-ray    Patient tolerance of procedure:  Tolerated well, no immediate complications    Observer: Yes      Observer name:  Dr. Lee

## 2024-06-10 NOTE — ASSESSMENT & PLAN NOTE
Worsening hypoxia requiring MFNC   Possibly in the setting of hypervolemia  CT chest - pending   Wean fi02 to maintain sp02 > 92%

## 2024-06-10 NOTE — PROGRESS NOTES
Critical access hospital  Progress Note  Name: Joaquin Frankel I  MRN: [id number]  Unit/Bed#: ICU 11 I Date of Admission: 6/3/2024   Date of Service: 6/10/2024 I Hospital Day: 7    Assessment & Plan   * CLOVIS (acute kidney injury) ([facility])  Assessment & Plan  Creatine on admission 6.4  Baseline creatine 1.2-1.6  Most recent creatine 9.70  Unclear etiology   6/6 temp HD cath placed  6/7 first iHD   6/8 iHD  6/9 placed on Bumex drip  6/9 started CVVH - remove 50 ml/hour  Kidney biopsy per nephrology   Monitor strict I&O    SIRS (systemic inflammatory response syndrome) ([facility])  Assessment & Plan  Unclear source of infection   Was being treated for possible lower extremity cellulitis however, site does not appear red or warm; patient reports pain has not improved  6/9 CT LLE - Extensive subcutaneous edema and skin thickening as described above   6/9 CT chest/abd/pelvis - pending   blood cultures pending   Lactic acid negative   Procal continues to rise in the setting of renal failure   Escalate to Zosyn at this time   Trend fever/WBC     Hypertension  Assessment & Plan  OP regimen consists of: carediolol, amlodipine, and lisinopril  Continue to hold lisinopril in the setting of acute renal failure  Continue to hold all anti-hypertensive's in the setting of low BP    Acute respiratory failure with hypoxia ([facility])  Assessment & Plan  Worsening hypoxia requiring MFNC   Possibly in the setting of hypervolemia  CT chest - pending   Wean fi02 to maintain sp02 > 92%    Diabetes ([facility])  Assessment & Plan  Continue SSI + lantus  Make adjustments accordingly     Chronic acquired lymphedema  Assessment & Plan  6/9 CT left leg - Extensive subcutaneous edema and skin thickening as described above     Coronary artery disease  Assessment & Plan  S/p stents in 2020  OP regimen consists of: aspirin and statin  Continue to hold aspirin for planned renal biopsy     Hyponatremia  Assessment & Plan  Likely secondary to hypervolemia    Trend     Morbid obesity (HCC)  Assessment & Plan  Encourage lifestyle modifications on discharge  Nutrition consult     Chest pain  Assessment & Plan  hsTNL negative x3 on admission   Patient with known CAD  ? Related to volume status  Continue to monitor     Anemia of chronic disease  Assessment & Plan  Hemoglobin 6.7 on AM labs  Baseline hemoglobin appears to be around 8  Type and screen pending   Transfuse to maintain hemoglobin > 7    Wound of abdomen  Assessment & Plan  Wound care nursing following  Continue site care             Disposition: Critical care    ICU Core Measures     A: Assess, Prevent, and Manage Pain Has pain been assessed? Yes  Need for changes to pain regimen? No   B: Both SAT/SAT  N/A   C: Choice of Sedation RASS Goal: N/A patient not on sedation  Need for changes to sedation or analgesia regimen? NA   D: Delirium CAM-ICU: Negative   E: Early Mobility  Plan for early mobility? Yes   F: Family Engagement Plan for family engagement today? Yes       Antibiotic Review: Awaiting culture results.     Review of Invasive Devices:    Robbins Plan: Continue for accurate I/O monitoring for 48 hours  Central access plan: HD cath in place.  Plan right subclavian      Prophylaxis:  VTE VTE covered by:  heparin (porcine), Subcutaneous, 5,000 Units at 06/09/24 2110       Stress Ulcer  not ordered         Significant 24hr Events     24hr events: Patient was started on CVVH last evening and has been have 50 ml/hr of fluid removed.       Subjective   Review of Systems: Review of Systems   Constitutional:  Positive for fatigue.   HENT: Negative.     Eyes: Negative.    Respiratory: Negative.     Cardiovascular: Negative.    Gastrointestinal: Negative.    Endocrine: Negative.    Genitourinary: Negative.    Musculoskeletal: Negative.    Skin: Negative.    Neurological:  Positive for weakness.   Hematological: Negative.    Psychiatric/Behavioral: Negative.          Objective                            Vitals I/O       Most Recent Min/Max in 24hrs   Temp (!) 96.8 °F (36 °C) Temp  Min: 96.5 °F (35.8 °C)  Max: 97.3 °F (36.3 °C)   Pulse 71 Pulse  Min: 66  Max: 84   Resp 16 Resp  Min: 13  Max: 21   /56 BP  Min: 80/35  Max: 131/61   O2 Sat 99 % SpO2  Min: 87 %  Max: 100 %      Intake/Output Summary (Last 24 hours) at 6/10/2024 0541  Last data filed at 6/10/2024 0400  Gross per 24 hour   Intake 853.46 ml   Output 1113 ml   Net -259.54 ml       Diet NPO; Sips with meds  Diet NPO; Sips of clear liquids    Invasive Monitoring           Physical Exam   Physical Exam  Vitals and nursing note reviewed.   Eyes:      General: Vision grossly intact.      Extraocular Movements: Extraocular movements intact.      Conjunctiva/sclera: Conjunctivae normal.      Pupils: Pupils are equal, round, and reactive to light.   Skin:     General: Skin is warm and dry.      Comments: LLE swelling    HENT:      Head: Normocephalic and atraumatic.      Right Ear: Hearing and tympanic membrane normal.      Left Ear: Hearing and tympanic membrane normal.      Mouth/Throat:      Mouth: Mucous membranes are dry.   Cardiovascular:      Rate and Rhythm: Normal rate and regular rhythm.   Musculoskeletal:         General: Normal range of motion.      Right lower leg: Trace Edema present.      Left lower leg: Trace Edema present.   Abdominal: General: Bowel sounds are normal.   Constitutional:       Appearance: He is well-developed and well-nourished.   Pulmonary:      Comments: On bipap  B/L breath sounds diminished   Neurological:      Mental Status: He is alert and oriented to person, place and time. Mental status is at baseline.      Motor: gross motor function is at baseline for patient. Strength full and intact in all extremities.        Corneal reflex present, cough reflex and gag reflex intact.   Genitourinary/Anorectal:  Robbins present.          Diagnostic Studies      EKG: NSR alarms on   Imaging: XR chest portable    Result Date: 6/9/2024  Impression:  Mild pulmonary venous congestion. Nothing to suggest pneumonia. Workstation performed: [id number]     IR temporary dialysis catheter placement    Result Date: 6/7/2024  Impression: 1. Uncomplicated nontunneled/temporary dialysis catheter placement (14 Mac[place] by 24 cm). Catheter tip terminates in the right atrium. The catheter is ready for immediate use. 2. Impaired hemostasis was discussed with Dr. Timmons of Nephrology. DDAVP will be administered to help mitigate rebleeding risk. Workstation performed: [id number]     CT chest abdomen pelvis wo contrast    Result Date: 6/3/2024  Impression: No acute pulmonary pathology. No acute abdominal or pelvic pathology within limitation of a noncontrast study. In particular, no hydronephrosis. Left inguinal and left external iliac lymphadenopathy, increased from May 2022. This may be reactive to a process in the left lower extremity or in the partially imaged pannus. Additional findings as above. Workstation performed: [id number]     NM lung ventilation / perfusion    Result Date: 6/3/2024  Impression: The probability for pulmonary embolus is low. Workstation performed: [id number]     XR chest 1 view portable    Result Date: 6/3/2024  Impression: No acute cardiopulmonary disease. Workstation performed: [id number]    I have personally reviewed pertinent reports.   and I have personally reviewed pertinent films in PACS     Medications:  Scheduled PRN   atorvastatin, 80 mg, Daily  Cholecalciferol, 5,000 Units, Daily  cyanocobalamin, 1,000 mcg, Daily  heparin (porcine), 5,000 Units, Q8H SHAZIA  insulin lispro, 1-6 Units, HS  insulin lispro, 2-12 Units, TID AC  nystatin, 1 Application, BID  piperacillin-tazobactam, 4.5 g, Q12H  sodium hypochlorite, 1 Application, Daily      acetaminophen, 650 mg, Q6H PRN  HYDROmorphone, 0.5 mg, Q3H PRN  ondansetron, 4 mg, Q6H PRN  oxyCODONE, 5 mg, Q4H PRN       Continuous    norepinephrine, 1-30 mcg/min, Last Rate: Stopped (06/09/24 1924)  NxStage K  4/Ca 3, 20,000 mL         Labs:    CBC    Recent Labs     06/08/24  0558 06/09/24  0450 06/09/24  1538   WBC 14.64* 13.08*  --    HGB 8.0* 6.7* 7.2*   HCT 26.0* 21.2*  --     281  --      BMP    Recent Labs     06/09/24  1954 06/10/24  0024   SODIUM 134* 133*   K 4.5 4.5   CL 98 99   CO2 25 23   AGAP 11 11   BUN 66* 64*   CREATININE 9.12* 8.31*   CALCIUM 7.1* 7.7*       Coags    No recent results     Additional Electrolytes  Recent Labs     06/09/24  1954 06/10/24  0024   MG 1.8* 1.9   PHOS 8.5* 7.7*   CAIONIZED 0.99* 1.05*          Blood Gas    No recent results  Recent Labs     06/09/24  1538   PHVEN 7.197*   SVA0LBD 63.9*   PO2VEN 84.9*   HOT7ANY 24.2   BEVEN -4.0   E2WUCEQ 92.3*    LFTs  No recent results    Infectious  Recent Labs     06/08/24  0558 06/09/24  0450   PROCALCITONI 7.47* 10.26*     Glucose  Recent Labs     06/09/24  0450 06/09/24  1538 06/09/24  1954 06/10/24  0024   GLUC 122 104 110 109               PETRA Johnson

## 2024-06-10 NOTE — ASSESSMENT & PLAN NOTE
Hemoglobin 6.7 on AM labs  Baseline hemoglobin appears to be around 8  Type and screen pending   Transfuse to maintain hemoglobin > 7

## 2024-06-10 NOTE — PROGRESS NOTES
Joaquin Frankel is a 44 y.o. male who is currently ordered Vancomycin IV with management by the Pharmacy Consult service.  Relevant clinical data and objective / subjective history reviewed.  Vancomycin Assessment:  Indication and Goal AUC/Trough: Soft tissue (goal -600, trough >10), -- (Target pre-HD trough level 10-15)  Clinical Status: worsening  Micro:     Renal Function:  SCr: 6.98 mg/dL  CrCl: 22 mL/min  Renal replacement: HD  Days of Therapy: 1  Current Dose: Vancomycin 2000 mg IV x 1 dose (loading dose)  Vancomycin Plan:  New Dosin mg IV Post-HD on Kralrq-Xligfoqms-Dcubvi  Next Level: 24 at 0600  Renal Function Monitoring: Daily BMP and UOP  Pharmacy will continue to follow closely for s/sx of nephrotoxicity, infusion reactions and appropriateness of therapy.  BMP and CBC will be ordered per protocol. We will continue to follow the patient’s culture results and clinical progress daily.    Aide Velazco, Pharmacist

## 2024-06-10 NOTE — PROGRESS NOTES
Hematology - Oncology Progress Note    Joaquin Frankel, 1980, [id number]  ICU 11/ICU 11      Impression and plan:    44-year-old male with multiple medical problems presently being treated for CLOVIS, SIRS, acute respiratory failure, diabetes, electrolyte abnormalities.  Wound care is following the abdominal wound.    Patient has anemia, significant.  Etiology is likely multifactorial.  Hemoglobin level is 6.7 g/deciliter, patient is to receive 1 unit of packed red blood cells.    Heavy chain immunoglobulins from June 7, 2024 did not demonstrate any elevated/abnormal values.  LDH is not elevated.  Urine immunofixation demonstrated a monoclonal immunoglobulin identified as IgG kappa.  Serum immunofixation also demonstrated a monoclonal gammopathy identified as IgG kappa.  Free light chains from Tori 3, 2024 were both elevated but the kappa lambda ratio was within normal limits.  Patient on IV iron.  Mr. Frankel is being evaluated for renal biopsy (monoclonal gammopathy of renal significance).  Patient is obviously very sick at this moment, bone marrow biopsy not indicated now.    Above discussed with patient; all questions answered.  Will follow.  ________________________________________________________________________________________    Chief complaint/reason for consult: Anemia    History of present illness: 44-year-old male with multiple medical problems including morbid obesity, diabetes, hypertension, left lower extremity swelling presented to the emergency room with progressive respiratory issues.  Patient was found to be anemic.  Mr. Frankel is in the ICU.  Active issues include CLOVIS, seizures, respiratory failure, abdominal wound.    Patient was found in bed in no apparent distress, patient was pleasant and responsive.  Activities are nil.    Hospital medications list:  Current Facility-Administered Medications   Medication Dose Route Frequency Provider Last Rate    acetaminophen  650 mg Oral Q6H PRN  "Laureen Spencer Rosa Elena, CRNP      Albumin 25%  12.5 g Intravenous Once PRN Joselyn Reyes Bahamonde, MD      atorvastatin  80 mg Oral Daily Laureen [place] Rosa Elena, CRNP      Cholecalciferol  5,000 Units Oral Daily Laureen Spencer Rosa Elena, CRNP      cyanocobalamin  1,000 mcg Oral Daily Laureen [place] Rosa Elena, CRNP      docusate sodium  100 mg Oral BID Laureen Spencer Rosa Elena, CRNP      heparin (porcine)  5,000 Units Subcutaneous Q8H SHAZIA Laureen Spencer Rosa Elena, CRNP      HYDROmorphone  0.5 mg Intravenous Q3H PRN Laureen Spencer Rosa Elena, CRNP      insulin lispro  1-6 Units Subcutaneous HS Laureen Spencer Rosa Elena, CRNP      insulin lispro  2-12 Units Subcutaneous TID AC Laureen [place] Rosa Elena, CRNP      nystatin  1 Application Topical BID Laureen Spencer Rosa Elena, CRNP      ondansetron  4 mg Intravenous Q6H PRN Laureen [place] Rosa Elena, CRNP      oxyCODONE  5 mg Oral Q4H PRN Laureen Spencer Rosa Elena, CRNP      piperacillin-tazobactam  4.5 g Intravenous Q12H Laureen [place] Rosa Elena, CRNP Stopped (06/10/24 0427)    polyethylene glycol  17 g Oral Daily PRN Laureen [place] Rosa Elena, CRNP      senna  1 tablet Oral HS Laureen [place] Rosa Elena, CRNP      sodium hypochlorite  1 Application Irrigation Daily Laureen Spencer Rosa Elena, CRNP       Physical exam  /53 (BP Location: Left arm)   Pulse 74   Temp (!) 97 °F (36.1 °C) (Tympanic)   Resp 18   Ht 5' 7\" (1.702 m)   Wt (!) 186 kg (410 lb 0.9 oz)   SpO2 97%   BMI 64.22 kg/m²   Constitutional: Morbidly obese male, no respiratory distress  Eyes: PERRL, conjunctiva , anicteric    Respiratory: Distant breath sounds bilaterally, scattered rhonchi  Cardiovascular: Normal rate, normal rhythm, no murmurs, no gallops, no rubs    GI: Soft, obese, nontender, distant bowel sounds  Integument: Pale, warm, moist, scattered purpura  Lymphatic: No adenopathy in the neck, supra-clavicular region, axilla and groin bilaterally  Extremities: 1+ right lower extremity edema plus obesity, 3+++ left lower extremity edema " plus obesity  Neurologic: Alert & oriented x 3, CN 2-12 normal, normal motor function, normal sensory function, no focal deficits noted  Psychiatric: Pleasant, responsive, appropriate  Rectal: Deferred    Laboratory test results        Sick/10/2024 BUN = 50 creatinine = 6.98 GFR = 8

## 2024-06-10 NOTE — PLAN OF CARE
Post-Dialysis RN Treatment Note    Blood Pressure:  Pre 103/54 mm/Hg  Post 95/48 mmHg   EDW  TBD kg    Weight:  Pre 186 kg   Post 184.5 kg   Mode of weight measurement: Bed Scale   Volume Removed  1500 ml    Treatment duration 150  minutes    NS given  No    Treatment shortened? Yes, describe: Catheter leak and subsequent replacement.  This was don by [facility].    Medications given during Rx Not Applicable   Estimated Kt/V  Not Applicable   Access type: Temporary HD catheter   Access Issues: Yes, describe: See below     Report called to primary nurse   Yes     Maylin Tucker RN            Goal of treatment is the removal of 1.5-2.0 kg fluid .  Clearance of renal toxins and waste.      Problem: METABOLIC, FLUID AND ELECTROLYTES - ADULT  Goal: Electrolytes maintained within normal limits  Description: INTERVENTIONS:  - Monitor labs and assess patient for signs and symptoms of electrolyte imbalances  - Administer electrolyte replacement as ordered  - Monitor response to electrolyte replacements, including repeat lab results as appropriate  - Instruct patient on fluid and nutrition as appropriate  Outcome: Progressing  Goal: Fluid balance maintained  Description: INTERVENTIONS:  - Monitor labs   - Monitor I/O and WT  - Instruct patient on fluid and nutrition as appropriate  - Assess for signs & symptoms of volume excess or deficit  Outcome: Progressing

## 2024-06-10 NOTE — ASSESSMENT & PLAN NOTE
Unclear source of infection   Was being treated for possible lower extremity cellulitis however, site does not appear red or warm; patient reports pain has not improved  6/9 CT LLE - Extensive subcutaneous edema and skin thickening as described above   6/9 CT chest/abd/pelvis - pending   blood cultures pending   Lactic acid negative   Procal continues to rise in the setting of renal failure   Escalate to Zosyn at this time   Trend fever/WBC

## 2024-06-10 NOTE — PROGRESS NOTES
Cardiology Progress Note  [facility]     Joaquin Frankel 44 y.o. male MRN: [id number]  : 1980  Unit/Bed#: ICU 11 Encounter: [id number]        ASSESSMENT:  Admitted on this occasion for progressive RICHARDSON and exertional chest pain. Presenting EKG showed no acute diagnostic ischemic changes; hs troponin negative x3.   Acute renal failure.  Possibly due to diabetic nephropathy, recent use of NSAIDs, home ACE inhibitor, and cardiorenal syndrome with diastolic dysfunction.  S/p right IJ nontunneled temporary dialysis catheter 2024.  Echo 24: LVEF 58% with grade I LVDD and normal wall motion. RV pressure is moderately elevated at 58 mmHg.   Elevated D dimer.  VQ scan showed low probability of PE.  CT chest and abdomen without contrast 2024 report: Slight mosaic attenuation of the lungs.  No other acute pulmonary pathology.  See full report in Epic.  Monoclonal gammopathy.   SIRS. Unclear source of infection.   Hypertensive urgency on arrival.   CAD. NSTEMI in 2020 s/p PCI to RCA then. Hx of previous PCI to LCX in .   Morbid obesity with chronic lymphedema in the LLE.   Anemia of chronic disease.  Hx of L groin cellulitis and mass with skin grafting.  PMHx includes HLD, DM II, hx of tobacco use (reported stopping 3 years ago).     PLAN AND RECOMMENDATIONS:  He was transferred to the ICU over the weekend due to hypotension during dialysis.  His Coreg and amlodipine are currently on hold as a result.  Trend pressures and consider adding back once his BP improves.    Volume is being managed through dialysis.  Continue atorvastatin 80 mg daily.  Has chronic anemia, hemoglobin 6.9 today.  Usually runs between ~7 and 8.  Telemetry shows sinus rhythm with artifact.  The plan for renal biopsy later this week, aspirin is on hold.        Interval history: He was transferred to the ICU over the weekend after developing hypotension during dialysis. Presently, he is lying comfortably in  bed.         Meds/Allergies   current meds:   Current Facility-Administered Medications   Medication Dose Route Frequency    acetaminophen (TYLENOL) tablet 650 mg  650 mg Oral Q6H PRN    albumin human (FLEXBUMIN) 25 % injection 12.5 g  12.5 g Intravenous Once PRN    atorvastatin (LIPITOR) tablet 80 mg  80 mg Oral Daily    Cholecalciferol (VITAMIN D3) tablet 5,000 Units  5,000 Units Oral Daily    cyanocobalamin (VITAMIN B-12) tablet 1,000 mcg  1,000 mcg Oral Daily    docusate sodium (COLACE) capsule 100 mg  100 mg Oral BID    heparin (porcine) subcutaneous injection 5,000 Units  5,000 Units Subcutaneous Q8H SHAZIA    HYDROmorphone (DILAUDID) injection 0.5 mg  0.5 mg Intravenous Q3H PRN    insulin lispro (HumALOG/ADMELOG) 100 units/mL subcutaneous injection 1-6 Units  1-6 Units Subcutaneous HS    insulin lispro (HumALOG/ADMELOG) 100 units/mL subcutaneous injection 2-12 Units  2-12 Units Subcutaneous TID AC    magnesium sulfate 1-5 GM/100ML-% IVPB (premix) SOLN **ADS Override Pull**        midodrine (PROAMATINE) tablet 5 mg  5 mg Oral Before Dialysis    nystatin (MYCOSTATIN) powder 1 Application  1 Application Topical BID    ondansetron (ZOFRAN) injection 4 mg  4 mg Intravenous Q6H PRN    oxyCODONE (ROXICODONE) IR tablet 5 mg  5 mg Oral Q4H PRN    piperacillin-tazobactam (ZOSYN) 4.5 g in sodium chloride 0.9 % 100 mL IVPB (EXTENDED INFUSION)  4.5 g Intravenous Q12H    polyethylene glycol (MIRALAX) packet 17 g  17 g Oral Daily PRN    senna (SENOKOT) tablet 8.6 mg  1 tablet Oral HS    sodium hypochlorite (DAKIN'S HALF-STRENGTH) 0.25 percent topical solution 1 Application  1 Application Irrigation Daily    [START ON 6/12/2024] vancomycin (VANCOCIN) 1,250 mg in sodium chloride 0.9 % 250 mL IVPB  10 mg/kg (Adjusted) Intravenous Once per day on Monday Wednesday Friday    vancomycin (VANCOCIN) 2,000 mg in sodium chloride 0.9 % 500 mL IVPB  2,000 mg Intravenous Once     Allergies   Allergen Reactions    Keflex [Cephalexin] Facial  "Swelling and Lip Swelling    Amoxicillin Hives     childhood       Objective   Vitals: Blood pressure (!) 92/49, pulse 71, temperature 97.7 °F (36.5 °C), temperature source Oral, resp. rate 21, height 5' 7\" (1.702 m), weight (!) 186 kg (410 lb 0.9 oz), SpO2 94%.,       Intake/Output Summary (Last 24 hours) at 6/10/2024 1104  Last data filed at 6/10/2024 0800  Gross per 24 hour   Intake 945.46 ml   Output 1474 ml   Net -528.54 ml       Physical Exam:  General: pleasant, comfortable, in no acute distress  Neurologic: speech is coherent, awake and alert  Cardiovascular: regular S1 and S2   Pulmonary: Diminished breath sounds bilaterally though this could be due to body habitus  Abdomen: soft, nontender  Extremities: 1-2+ pitting edema RLE.  LLE with massive edema secondary to lymphedema.      Lab Results:   Troponins:   Results from last 7 days   Lab Units 06/03/24  1240   CK TOTAL U/L 267       Recent Results (from the past 24 hour(s))   Fingerstick Glucose (POCT)    Collection Time: 06/09/24 11:26 AM   Result Value Ref Range    POC Glucose 113 65 - 140 mg/dl   Blood gas, venous    Collection Time: 06/09/24  3:38 PM   Result Value Ref Range    pH, Francisco 7.197 (LL) 7.300 - 7.400    pCO2, Francisco 63.9 (H) 42.0 - 50.0 mm Hg    pO2, [place] 84.9 (H) 35.0 - 45.0 mm Hg    HCO3, Francisco 24.2 24 - 30 mmol/L    Base Excess, Francisco -4.0 mmol/L    O2 Content, Francisco 10.9 ml/dL    O2 HGB, VENOUS 92.3 (H) 60.0 - 80.0 %    Temperature 96.8 Degrees Fehrenheit    Nasal Cannula 8    Magnesium    Collection Time: 06/09/24  3:38 PM   Result Value Ref Range    Magnesium 1.9 1.9 - 2.7 mg/dL   Lactic acid, plasma (w/reflex if result > 2.0)    Collection Time: 06/09/24  3:38 PM   Result Value Ref Range    LACTIC ACID 0.4 (L) 0.5 - 2.0 mmol/L   Type and screen    Collection Time: 06/09/24  3:38 PM   Result Value Ref Range    ABO Grouping A     Rh Factor Positive     Antibody Screen Negative     Specimen Expiration Date [id number]    Hemoglobin    Collection Time: " 06/09/24  3:38 PM   Result Value Ref Range    Hemoglobin 7.2 (L) 12.0 - 17.0 g/dL   Calcium, ionized    Collection Time: 06/09/24  3:38 PM   Result Value Ref Range    Calcium, Ionized 0.97 (L) 1.12 - 1.32 mmol/L   Phosphorus    Collection Time: 06/09/24  3:38 PM   Result Value Ref Range    Phosphorus 10.2 (H) 2.7 - 4.5 mg/dL   Basic metabolic panel    Collection Time: 06/09/24  3:38 PM   Result Value Ref Range    Sodium 134 (L) 135 - 147 mmol/L    Potassium 4.7 3.5 - 5.3 mmol/L    Chloride 96 96 - 108 mmol/L    CO2 25 21 - 32 mmol/L    ANION GAP 13 4 - 13 mmol/L    BUN 73 (H) 5 - 25 mg/dL    Creatinine 10.42 (H) 0.60 - 1.30 mg/dL    Glucose 104 65 - 140 mg/dL    Calcium 7.1 (L) 8.4 - 10.2 mg/dL    eGFR 5 ml/min/1.73sq m   Hemoglobin A1C w/ EAG Estimation    Collection Time: 06/09/24  3:38 PM   Result Value Ref Range    Hemoglobin A1C 5.7 (H) Normal 4.0-5.6%; PreDiabetic 5.7-6.4%; Diabetic >=6.5%; Glycemic control for adults with diabetes <7.0% %     mg/dl   Urea nitrogen, urine    Collection Time: 06/09/24  3:39 PM   Result Value Ref Range    Urea Nitrogen, Ur 180 Reference range not established. mg/dL   Creatinine, urine, random    Collection Time: 06/09/24  3:39 PM   Result Value Ref Range    Creatinine, Ur 386.8 Reference range not established. mg/dL   Fingerstick Glucose (POCT)    Collection Time: 06/09/24  4:03 PM   Result Value Ref Range    POC Glucose 99 65 - 140 mg/dl   Basic metabolic panel    Collection Time: 06/09/24  7:54 PM   Result Value Ref Range    Sodium 134 (L) 135 - 147 mmol/L    Potassium 4.5 3.5 - 5.3 mmol/L    Chloride 98 96 - 108 mmol/L    CO2 25 21 - 32 mmol/L    ANION GAP 11 4 - 13 mmol/L    BUN 66 (H) 5 - 25 mg/dL    Creatinine 9.12 (H) 0.60 - 1.30 mg/dL    Glucose 110 65 - 140 mg/dL    Calcium 7.1 (L) 8.4 - 10.2 mg/dL    eGFR 6 ml/min/1.73sq m   Calcium, ionized    Collection Time: 06/09/24  7:54 PM   Result Value Ref Range    Calcium, Ionized 0.99 (L) 1.12 - 1.32 mmol/L    Phosphorus    Collection Time: 06/09/24  7:54 PM   Result Value Ref Range    Phosphorus 8.5 (H) 2.7 - 4.5 mg/dL   Magnesium    Collection Time: 06/09/24  7:54 PM   Result Value Ref Range    Magnesium 1.8 (L) 1.9 - 2.7 mg/dL   Fingerstick Glucose (POCT)    Collection Time: 06/09/24  8:20 PM   Result Value Ref Range    POC Glucose 119 65 - 140 mg/dl   Basic metabolic panel    Collection Time: 06/10/24 12:24 AM   Result Value Ref Range    Sodium 133 (L) 135 - 147 mmol/L    Potassium 4.5 3.5 - 5.3 mmol/L    Chloride 99 96 - 108 mmol/L    CO2 23 21 - 32 mmol/L    ANION GAP 11 4 - 13 mmol/L    BUN 64 (H) 5 - 25 mg/dL    Creatinine 8.31 (H) 0.60 - 1.30 mg/dL    Glucose 109 65 - 140 mg/dL    Calcium 7.7 (L) 8.4 - 10.2 mg/dL    eGFR 7 ml/min/1.73sq m   Calcium, ionized    Collection Time: 06/10/24 12:24 AM   Result Value Ref Range    Calcium, Ionized 1.05 (L) 1.12 - 1.32 mmol/L   Magnesium    Collection Time: 06/10/24 12:24 AM   Result Value Ref Range    Magnesium 1.9 1.9 - 2.7 mg/dL   Phosphorus    Collection Time: 06/10/24 12:24 AM   Result Value Ref Range    Phosphorus 7.7 (H) 2.7 - 4.5 mg/dL   Basic metabolic panel    Collection Time: 06/10/24  6:07 AM   Result Value Ref Range    Sodium 135 135 - 147 mmol/L    Potassium 4.7 3.5 - 5.3 mmol/L    Chloride 100 96 - 108 mmol/L    CO2 26 21 - 32 mmol/L    ANION GAP 9 4 - 13 mmol/L    BUN 50 (H) 5 - 25 mg/dL    Creatinine 6.98 (H) 0.60 - 1.30 mg/dL    Glucose 101 65 - 140 mg/dL    Calcium 7.8 (L) 8.4 - 10.2 mg/dL    eGFR 8 ml/min/1.73sq m   CBC and differential    Collection Time: 06/10/24  6:07 AM   Result Value Ref Range    WBC 13.22 (H) 4.31 - 10.16 Thousand/uL    RBC 2.46 (L) 3.88 - 5.62 Million/uL    Hemoglobin 6.9 (L) 12.0 - 17.0 g/dL    Hematocrit 22.3 (L) 36.5 - 49.3 %    MCV 91 82 - 98 fL    MCH 28.0 26.8 - 34.3 pg    MCHC 30.9 (L) 31.4 - 37.4 g/dL    RDW 15.0 11.6 - 15.1 %    MPV 8.8 (L) 8.9 - 12.7 fL    Platelets 267 149 - 390 Thousands/uL    nRBC 0 /100 WBCs     Segmented % 84 (H) 43 - 75 %    Immature Grans % 1 0 - 2 %    Lymphocytes % 4 (L) 14 - 44 %    Monocytes % 9 4 - 12 %    Eosinophils Relative 2 0 - 6 %    Basophils Relative 0 0 - 1 %    Absolute Neutrophils 11.14 (H) 1.85 - 7.62 Thousands/µL    Absolute Immature Grans 0.08 0.00 - 0.20 Thousand/uL    Absolute Lymphocytes 0.51 (L) 0.60 - 4.47 Thousands/µL    Absolute Monocytes 1.23 (H) 0.17 - 1.22 Thousand/µL    Eosinophils Absolute 0.24 0.00 - 0.61 Thousand/µL    Basophils Absolute 0.02 0.00 - 0.10 Thousands/µL   Calcium, ionized    Collection Time: 06/10/24  6:07 AM   Result Value Ref Range    Calcium, Ionized 1.10 (L) 1.12 - 1.32 mmol/L   Magnesium    Collection Time: 06/10/24  6:07 AM   Result Value Ref Range    Magnesium 2.0 1.9 - 2.7 mg/dL   Phosphorus    Collection Time: 06/10/24  6:07 AM   Result Value Ref Range    Phosphorus 6.3 (H) 2.7 - 4.5 mg/dL   Fingerstick Glucose (POCT)    Collection Time: 06/10/24  7:17 AM   Result Value Ref Range    POC Glucose 93 65 - 140 mg/dl   Blood gas, arterial    Collection Time: 06/10/24  9:55 AM   Result Value Ref Range    pH, Arterial 7.294 (L) 7.350 - 7.450    PH ART TC 7.300 (L) 7.350 - 7.450    pCO2, Arterial 52.4 (H) 36.0 - 44.0 mm Hg    PCO2 (TC) Arterial 51.5 (H) 36.0 - 44.0 mm Hg    pO2, Arterial 81.3 75.0 - 129.0 mm Hg    PO2 (TC) Arterial 79.2 75.0 - 129.0 mm Hg    HCO3, Arterial 24.9 22.0 - 28.0 mmol/L    Base Excess, Arterial -1.7 mmol/L    O2 Content, Arterial 10.6 (L) 16.0 - 23.0 mL/dL    O2 HGB,Arterial  94.1 94.0 - 97.0 %    SOURCE Radial, Right     ANH TEST Yes     Temperature 97.9 Degrees Fehrenheit    Nasal Cannula 5    Fingerstick Glucose (POCT)    Collection Time: 06/10/24 11:01 AM   Result Value Ref Range    POC Glucose 98 65 - 140 mg/dl          Cardiac testing:   Relevant testing reviewed - see above  Telemetry: SR with artifact    Jose De Jesus Cardozo PA-C  6/10/2024  11:13 AM     This note was completed in part utilizing direct voice dictation  software.  Grammatical errors, random word insertions, spelling mistakes, and incomplete sentences may be an occasional consequence of the system secondary to software limitations, ambient noise and hardware issues. Please read the chart carefully and recognize, using context, where substitutions have occurred.

## 2024-06-11 ENCOUNTER — APPOINTMENT (INPATIENT)
Dept: DIALYSIS | Facility: HOSPITAL | Age: 44
DRG: 673 | End: 2024-06-11
Payer: COMMERCIAL

## 2024-06-11 LAB
ANION GAP SERPL CALCULATED.3IONS-SCNC: 10 MMOL/L (ref 4–13)
BASOPHILS # BLD AUTO: 0.06 THOUSANDS/ÂΜL (ref 0–0.1)
BASOPHILS NFR BLD AUTO: 1 % (ref 0–1)
BUN SERPL-MCNC: 46 MG/DL (ref 5–25)
CA-I BLD-SCNC: 1.05 MMOL/L (ref 1.12–1.32)
CALCIUM SERPL-MCNC: 7.7 MG/DL (ref 8.4–10.2)
CHLORIDE SERPL-SCNC: 98 MMOL/L (ref 96–108)
CO2 SERPL-SCNC: 25 MMOL/L (ref 21–32)
CREAT SERPL-MCNC: 6.75 MG/DL (ref 0.6–1.3)
EOSINOPHIL # BLD AUTO: 0.23 THOUSAND/ÂΜL (ref 0–0.61)
EOSINOPHIL NFR BLD AUTO: 2 % (ref 0–6)
ERYTHROCYTE [DISTWIDTH] IN BLOOD BY AUTOMATED COUNT: 14.8 % (ref 11.6–15.1)
GFR SERPL CREATININE-BSD FRML MDRD: 9 ML/MIN/1.73SQ M
GLUCOSE SERPL-MCNC: 113 MG/DL (ref 65–140)
GLUCOSE SERPL-MCNC: 117 MG/DL (ref 65–140)
GLUCOSE SERPL-MCNC: 130 MG/DL (ref 65–140)
GLUCOSE SERPL-MCNC: 72 MG/DL (ref 65–140)
GLUCOSE SERPL-MCNC: 78 MG/DL (ref 65–140)
GLUCOSE SERPL-MCNC: 88 MG/DL (ref 65–140)
HCT VFR BLD AUTO: 22 % (ref 36.5–49.3)
HGB BLD-MCNC: 6.9 G/DL (ref 12–17)
IMM GRANULOCYTES # BLD AUTO: 0.09 THOUSAND/UL (ref 0–0.2)
IMM GRANULOCYTES NFR BLD AUTO: 1 % (ref 0–2)
LYMPHOCYTES # BLD AUTO: 0.64 THOUSANDS/ÂΜL (ref 0.6–4.47)
LYMPHOCYTES NFR BLD AUTO: 6 % (ref 14–44)
MAGNESIUM SERPL-MCNC: 2 MG/DL (ref 1.9–2.7)
MCH RBC QN AUTO: 28 PG (ref 26.8–34.3)
MCHC RBC AUTO-ENTMCNC: 31.4 G/DL (ref 31.4–37.4)
MCV RBC AUTO: 89 FL (ref 82–98)
MONOCYTES # BLD AUTO: 0.85 THOUSAND/ÂΜL (ref 0.17–1.22)
MONOCYTES NFR BLD AUTO: 8 % (ref 4–12)
NEUTROPHILS # BLD AUTO: 9.39 THOUSANDS/ÂΜL (ref 1.85–7.62)
NEUTS SEG NFR BLD AUTO: 82 % (ref 43–75)
NRBC BLD AUTO-RTO: 0 /100 WBCS
PHOSPHATE SERPL-MCNC: 6.4 MG/DL (ref 2.7–4.5)
PLATELET # BLD AUTO: 317 THOUSANDS/UL (ref 149–390)
PMV BLD AUTO: 8.6 FL (ref 8.9–12.7)
POTASSIUM SERPL-SCNC: 4.2 MMOL/L (ref 3.5–5.3)
RBC # BLD AUTO: 2.46 MILLION/UL (ref 3.88–5.62)
SODIUM SERPL-SCNC: 133 MMOL/L (ref 135–147)
WBC # BLD AUTO: 11.26 THOUSAND/UL (ref 4.31–10.16)

## 2024-06-11 PROCEDURE — 94760 N-INVAS EAR/PLS OXIMETRY 1: CPT

## 2024-06-11 PROCEDURE — 99232 SBSQ HOSP IP/OBS MODERATE 35: CPT | Performed by: INTERNAL MEDICINE

## 2024-06-11 PROCEDURE — 82948 REAGENT STRIP/BLOOD GLUCOSE: CPT

## 2024-06-11 PROCEDURE — 99232 SBSQ HOSP IP/OBS MODERATE 35: CPT | Performed by: ANESTHESIOLOGY

## 2024-06-11 PROCEDURE — 82330 ASSAY OF CALCIUM: CPT

## 2024-06-11 PROCEDURE — 80048 BASIC METABOLIC PNL TOTAL CA: CPT

## 2024-06-11 PROCEDURE — 99232 SBSQ HOSP IP/OBS MODERATE 35: CPT | Performed by: STUDENT IN AN ORGANIZED HEALTH CARE EDUCATION/TRAINING PROGRAM

## 2024-06-11 PROCEDURE — P9016 RBC LEUKOCYTES REDUCED: HCPCS

## 2024-06-11 PROCEDURE — 30233N1 TRANSFUSION OF NONAUTOLOGOUS RED BLOOD CELLS INTO PERIPHERAL VEIN, PERCUTANEOUS APPROACH: ICD-10-PCS | Performed by: ANESTHESIOLOGY

## 2024-06-11 PROCEDURE — 84100 ASSAY OF PHOSPHORUS: CPT

## 2024-06-11 PROCEDURE — 94660 CPAP INITIATION&MGMT: CPT

## 2024-06-11 PROCEDURE — 85025 COMPLETE CBC W/AUTO DIFF WBC: CPT

## 2024-06-11 PROCEDURE — 94762 N-INVAS EAR/PLS OXIMTRY CONT: CPT

## 2024-06-11 PROCEDURE — 83735 ASSAY OF MAGNESIUM: CPT

## 2024-06-11 PROCEDURE — NC001 PR NO CHARGE: Performed by: NURSE PRACTITIONER

## 2024-06-11 RX ORDER — HEPARIN SODIUM 5000 [USP'U]/ML
5000 INJECTION, SOLUTION INTRAVENOUS; SUBCUTANEOUS EVERY 8 HOURS SCHEDULED
Status: DISCONTINUED | OUTPATIENT
Start: 2024-06-12 | End: 2024-06-26 | Stop reason: HOSPADM

## 2024-06-11 RX ORDER — ACETAMINOPHEN 325 MG/1
650 TABLET ORAL EVERY 6 HOURS SCHEDULED
Status: DISCONTINUED | OUTPATIENT
Start: 2024-06-11 | End: 2024-06-17

## 2024-06-11 RX ORDER — GABAPENTIN 300 MG/1
300 CAPSULE ORAL 2 TIMES DAILY
Status: DISCONTINUED | OUTPATIENT
Start: 2024-06-11 | End: 2024-06-21

## 2024-06-11 RX ORDER — LIDOCAINE 50 MG/G
1 PATCH TOPICAL DAILY
Status: DISCONTINUED | OUTPATIENT
Start: 2024-06-11 | End: 2024-06-26 | Stop reason: HOSPADM

## 2024-06-11 RX ADMIN — GABAPENTIN 300 MG: 300 CAPSULE ORAL at 21:16

## 2024-06-11 RX ADMIN — ACETAMINOPHEN 650 MG: 325 TABLET ORAL at 12:34

## 2024-06-11 RX ADMIN — HEPARIN SODIUM 5000 UNITS: 5000 INJECTION, SOLUTION INTRAVENOUS; SUBCUTANEOUS at 05:56

## 2024-06-11 RX ADMIN — DOCUSATE SODIUM 100 MG: 100 CAPSULE, LIQUID FILLED ORAL at 10:23

## 2024-06-11 RX ADMIN — Medication 2.5 MG: at 10:54

## 2024-06-11 RX ADMIN — ATORVASTATIN CALCIUM 80 MG: 80 TABLET, FILM COATED ORAL at 10:24

## 2024-06-11 RX ADMIN — PIPERACILLIN AND TAZOBACTAM 4.5 G: 4; .5 INJECTION, POWDER, FOR SOLUTION INTRAVENOUS at 23:12

## 2024-06-11 RX ADMIN — NYSTATIN 1 APPLICATION: 100000 POWDER TOPICAL at 18:47

## 2024-06-11 RX ADMIN — MIDODRINE HYDROCHLORIDE 5 MG: 5 TABLET ORAL at 13:17

## 2024-06-11 RX ADMIN — Medication 2.5 MG: at 15:53

## 2024-06-11 RX ADMIN — PIPERACILLIN AND TAZOBACTAM 4.5 G: 4; .5 INJECTION, POWDER, FOR SOLUTION INTRAVENOUS at 01:13

## 2024-06-11 RX ADMIN — ACETAMINOPHEN 650 MG: 325 TABLET ORAL at 23:12

## 2024-06-11 RX ADMIN — HEPARIN SODIUM 5000 UNITS: 5000 INJECTION, SOLUTION INTRAVENOUS; SUBCUTANEOUS at 15:44

## 2024-06-11 RX ADMIN — Medication 5000 UNITS: at 10:24

## 2024-06-11 RX ADMIN — VANCOMYCIN HYDROCHLORIDE 1250 MG: 10 INJECTION, POWDER, LYOPHILIZED, FOR SOLUTION INTRAVENOUS at 15:47

## 2024-06-11 RX ADMIN — PIPERACILLIN AND TAZOBACTAM 4.5 G: 4; .5 INJECTION, POWDER, FOR SOLUTION INTRAVENOUS at 12:34

## 2024-06-11 RX ADMIN — NYSTATIN 1 APPLICATION: 100000 POWDER TOPICAL at 10:25

## 2024-06-11 RX ADMIN — ONDANSETRON 4 MG: 2 INJECTION INTRAMUSCULAR; INTRAVENOUS at 16:53

## 2024-06-11 RX ADMIN — Medication 2.5 MG: at 21:16

## 2024-06-11 RX ADMIN — CYANOCOBALAMIN TAB 500 MCG 1000 MCG: 500 TAB at 10:24

## 2024-06-11 RX ADMIN — HYOSCYAMINE SULFATE 1 APPLICATION: 16 SOLUTION at 10:27

## 2024-06-11 NOTE — PROGRESS NOTES
Critical Care Interval Transfer Note:    Brief Hospital Summary:   Hospital Course: 43 yo male who presented 6/3 with shortness of breath and found to have CLOVIS on CKD. Eventually started on iHD, converted to CVVHD due to hypotension. Now tolerating iHD. Completing a course of antibiotics (zosyn, vanco) for possible cellulitis. Renal biopsy scheduled for tomorrow due to concern for possible multiple myeloma. Hematology also following. Received 1u PRBC today, no concern for active bleeding.       Barriers to discharge:   Renal biopsy tomorrow  Will need eventual bone marrow biopsy  Eventual conversion to permacath  Intended 7 day course of antibiotics, currently on zosyn/Vanc     Consults: IP CONSULT TO NEPHROLOGY  IP CONSULT TO CARDIOLOGY  IP CONSULT TO NEPHROLOGY  INPATIENT CONSULT TO IR  IP CONSULT TO ACUTE CARE SURGERY  IP CONSULT TO HEMATOLOGY  INPATIENT CONSULT TO IR  IP CONSULT TO PHARMACY  IP CONSULT TO MEDICAL CRITICAL CARE  IP CONSULT TO PHARMACY      Discharge Plan: Anticipate discharge in >72 hrs to rehab facility.  Central access plan: HD cath in place.  Plan will need eventual permacath       OT Recommendations: Home with outpatient rehabilitation  pending    Patient seen and evaluated by Critical Care today and deemed to be appropriate for transfer to Med Surg. Spoke to Dr. Hdez from [facility] to accept transfer. Critical care can be contacted via [employer] with any questions or concerns.

## 2024-06-11 NOTE — ASSESSMENT & PLAN NOTE
Creatine on admission 6.4  Baseline creatine 1.2-1.6  Most recent creatine 9.70  Unclear etiology   6/6 temp HD cath placed  6/7 first iHD   6/8 iHD  6/9 placed on Bumex drip  6/9 started CVVH - remove 50 ml/hour  6/10 transitioned to iHD  Kidney biopsy per nephrology   Monitor strict I&O

## 2024-06-11 NOTE — PROGRESS NOTES
Joaquin Frankel is a 44 y.o. male who is currently ordered Vancomycin IV with management by the Pharmacy Consult service.  Relevant clinical data and objective / subjective history reviewed.  Vancomycin Assessment:  Indication and Goal AUC/Trough: Soft tissue (goal -600, trough >10), -- (Target pre-HD trough level 10-15)  Clinical Status: worsening  Micro:     Renal Function:  SCr: 7.26 mg/dL  CrCl: 21.2 mL/min  Renal replacement: HD  Days of Therapy: 2  Current Dose: 1250mg IV Post-HD on    Vancomycin Plan:  New Dosin mg IV Post-HD on [id number]  Next Level: 24 at 0600  Renal Function Monitoring: Daily BMP and UOP  Pharmacy will continue to follow closely for s/sx of nephrotoxicity, infusion reactions and appropriateness of therapy.  BMP and CBC will be ordered per protocol. We will continue to follow the patient’s culture results and clinical progress daily.    Melodie Arita, Pharmacist

## 2024-06-11 NOTE — ASSESSMENT & PLAN NOTE
Hemoglobin 6.7 on AM labs  Baseline hemoglobin appears to be around 8  Transfuse to maintain hemoglobin > 7

## 2024-06-11 NOTE — PROGRESS NOTES
Cardiology Progress Note  [facility]     Joaquin Frankel 44 y.o. male MRN: [id number]  : 1980  Unit/Bed#: ICU 11 Encounter: [id number]        ASSESSMENT:  Admitted on this occasion for progressive RICHARDSON and exertional chest pain. Presenting EKG showed no acute diagnostic ischemic changes; hs troponin negative x3.   Acute renal failure.  Possibly due to diabetic nephropathy, recent use of NSAIDs, home ACE inhibitor, and cardiorenal syndrome with diastolic dysfunction.  S/p right IJ nontunneled temporary dialysis catheter 2024.  Echo 24: LVEF 58% with grade I LVDD and normal wall motion. RV pressure is moderately elevated at 58 mmHg.   Elevated D dimer.  VQ scan showed low probability of PE.  CT chest and abdomen without contrast 2024 report: Slight mosaic attenuation of the lungs.  No other acute pulmonary pathology.  See full report in Epic.  Monoclonal gammopathy.   SIRS. Unclear source of infection.   Hypertensive urgency on arrival.   CAD. NSTEMI in 2020 s/p PCI to RCA then. Hx of previous PCI to LCX in .   Morbid obesity with chronic lymphedema in the LLE.   Anemia of chronic disease.  Hx of L groin cellulitis and mass with skin grafting.  PMHx includes HLD, DM II, hx of tobacco use (reported stopping 3 years ago).        PLAN AND RECOMMENDATIONS:  Volume status is being managed through dialysis; plans for repeat dialysis session today.  His Coreg and amlodipine are currently on hold due to soft blood pressure.  He should continue atorvastatin 80 mg daily.  Chronic anemia with hemoglobin 6.9 today.  Running between high 6s to low 7s over the last two days; baseline around 8.   Aspirin is on hold for kidney biopsy planned for this week.  Telemetry reveals sinus rhythm with one atrial run and PVCs.  Plan to resume his beta-blocker possibly tomorrow if pressures improve.         Interval history: He is lying comfortably in bed.  He has no cardiac complaints at  rest.        Meds/Allergies   current meds:   Current Facility-Administered Medications   Medication Dose Route Frequency    acetaminophen (TYLENOL) tablet 650 mg  650 mg Oral Q6H SHAZIA    atorvastatin (LIPITOR) tablet 80 mg  80 mg Oral Daily    Cholecalciferol (VITAMIN D3) tablet 5,000 Units  5,000 Units Oral Daily    cyanocobalamin (VITAMIN B-12) tablet 1,000 mcg  1,000 mcg Oral Daily    docusate sodium (COLACE) capsule 100 mg  100 mg Oral BID    heparin (porcine) subcutaneous injection 5,000 Units  5,000 Units Subcutaneous Q8H SHAZIA    insulin lispro (HumALOG/ADMELOG) 100 units/mL subcutaneous injection 1-6 Units  1-6 Units Subcutaneous HS    insulin lispro (HumALOG/ADMELOG) 100 units/mL subcutaneous injection 2-12 Units  2-12 Units Subcutaneous TID AC    midodrine (PROAMATINE) tablet 5 mg  5 mg Oral Before Dialysis    nystatin (MYCOSTATIN) powder 1 Application  1 Application Topical BID    ondansetron (ZOFRAN) injection 4 mg  4 mg Intravenous Q6H PRN    oxyCODONE (ROXICODONE) split tablet 2.5 mg  2.5 mg Oral Q4H PRN    piperacillin-tazobactam (ZOSYN) 4.5 g in sodium chloride 0.9 % 100 mL IVPB (EXTENDED INFUSION)  4.5 g Intravenous Q12H    polyethylene glycol (MIRALAX) packet 17 g  17 g Oral Daily PRN    senna (SENOKOT) tablet 8.6 mg  1 tablet Oral HS    sodium hypochlorite (DAKIN'S HALF-STRENGTH) 0.25 percent topical solution 1 Application  1 Application Irrigation Daily    vancomycin (VANCOCIN) 1,250 mg in sodium chloride 0.9 % 250 mL IVPB  10 mg/kg (Adjusted) Intravenous After Dialysis    [START ON 6/13/2024] vancomycin (VANCOCIN) 1,250 mg in sodium chloride 0.9 % 250 mL IVPB  10 mg/kg (Adjusted) Intravenous Once per day on Tuesday Thursday Saturday     Allergies   Allergen Reactions    Keflex [Cephalexin] Facial Swelling and Lip Swelling    Amoxicillin Hives     childhood       Objective   Vitals: Blood pressure 112/61, pulse 72, temperature 98.6 °F (37 °C), temperature source Tympanic, resp. rate 20, height 5'  "7\" (1.702 m), weight (!) 186 kg (409 lb 9.8 oz), SpO2 96%.,       Intake/Output Summary (Last 24 hours) at 6/11/2024 1031  Last data filed at 6/11/2024 0059  Gross per 24 hour   Intake 1550 ml   Output 2170 ml   Net -620 ml       Physical Exam:  General: pleasant, comfortable, in no acute distress  Neurologic: speech is coherent, alert and oriented x3  Cardiovascular: regular S1 and S2, no murmurs  Pulmonary: Breath sounds diminished bilaterally though exam is limited due to body habitus  Abdomen: soft, nontender  Extremities: Significant lymphedema in LLE.  1-2+ pitting edema RLE.      Lab Results:   Troponins:       Recent Results (from the past 24 hour(s))   Fingerstick Glucose (POCT)    Collection Time: 06/10/24 11:01 AM   Result Value Ref Range    POC Glucose 98 65 - 140 mg/dl   Basic metabolic panel    Collection Time: 06/10/24 12:49 PM   Result Value Ref Range    Sodium 133 (L) 135 - 147 mmol/L    Potassium 4.7 3.5 - 5.3 mmol/L    Chloride 99 96 - 108 mmol/L    CO2 25 21 - 32 mmol/L    ANION GAP 9 4 - 13 mmol/L    BUN 53 (H) 5 - 25 mg/dL    Creatinine 7.62 (H) 0.60 - 1.30 mg/dL    Glucose 88 65 - 140 mg/dL    Calcium 7.8 (L) 8.4 - 10.2 mg/dL    eGFR 7 ml/min/1.73sq m   Calcium, ionized    Collection Time: 06/10/24 12:49 PM   Result Value Ref Range    Calcium, Ionized 1.08 (L) 1.12 - 1.32 mmol/L   Magnesium    Collection Time: 06/10/24 12:49 PM   Result Value Ref Range    Magnesium 2.0 1.9 - 2.7 mg/dL   Phosphorus    Collection Time: 06/10/24 12:49 PM   Result Value Ref Range    Phosphorus 6.9 (H) 2.7 - 4.5 mg/dL   Fingerstick Glucose (POCT)    Collection Time: 06/10/24  3:26 PM   Result Value Ref Range    POC Glucose 80 65 - 140 mg/dl   Fingerstick Glucose (POCT)    Collection Time: 06/10/24  8:14 PM   Result Value Ref Range    POC Glucose 83 65 - 140 mg/dl   Basic metabolic panel    Collection Time: 06/11/24  6:07 AM   Result Value Ref Range    Sodium 133 (L) 135 - 147 mmol/L    Potassium 4.2 3.5 - 5.3 " mmol/L    Chloride 98 96 - 108 mmol/L    CO2 25 21 - 32 mmol/L    ANION GAP 10 4 - 13 mmol/L    BUN 46 (H) 5 - 25 mg/dL    Creatinine 6.75 (H) 0.60 - 1.30 mg/dL    Glucose 78 65 - 140 mg/dL    Calcium 7.7 (L) 8.4 - 10.2 mg/dL    eGFR 9 ml/min/1.73sq m   Calcium, ionized    Collection Time: 06/11/24  6:07 AM   Result Value Ref Range    Calcium, Ionized 1.05 (L) 1.12 - 1.32 mmol/L   Magnesium    Collection Time: 06/11/24  6:07 AM   Result Value Ref Range    Magnesium 2.0 1.9 - 2.7 mg/dL   Phosphorus    Collection Time: 06/11/24  6:07 AM   Result Value Ref Range    Phosphorus 6.4 (H) 2.7 - 4.5 mg/dL   CBC and differential    Collection Time: 06/11/24  6:07 AM   Result Value Ref Range    WBC 11.26 (H) 4.31 - 10.16 Thousand/uL    RBC 2.46 (L) 3.88 - 5.62 Million/uL    Hemoglobin 6.9 (L) 12.0 - 17.0 g/dL    Hematocrit 22.0 (L) 36.5 - 49.3 %    MCV 89 82 - 98 fL    MCH 28.0 26.8 - 34.3 pg    MCHC 31.4 31.4 - 37.4 g/dL    RDW 14.8 11.6 - 15.1 %    MPV 8.6 (L) 8.9 - 12.7 fL    Platelets 317 149 - 390 Thousands/uL    nRBC 0 /100 WBCs    Segmented % 82 (H) 43 - 75 %    Immature Grans % 1 0 - 2 %    Lymphocytes % 6 (L) 14 - 44 %    Monocytes % 8 4 - 12 %    Eosinophils Relative 2 0 - 6 %    Basophils Relative 1 0 - 1 %    Absolute Neutrophils 9.39 (H) 1.85 - 7.62 Thousands/µL    Absolute Immature Grans 0.09 0.00 - 0.20 Thousand/uL    Absolute Lymphocytes 0.64 0.60 - 4.47 Thousands/µL    Absolute Monocytes 0.85 0.17 - 1.22 Thousand/µL    Eosinophils Absolute 0.23 0.00 - 0.61 Thousand/µL    Basophils Absolute 0.06 0.00 - 0.10 Thousands/µL   Fingerstick Glucose (POCT)    Collection Time: 06/11/24  6:17 AM   Result Value Ref Range    POC Glucose 72 65 - 140 mg/dl   Fingerstick Glucose (POCT)    Collection Time: 06/11/24  7:47 AM   Result Value Ref Range    POC Glucose 88 65 - 140 mg/dl          Cardiac testing:   Relevant testing reviewed - see above  Telemetry: Sinus rhythm, one atrial run, PVCs    Jose De Jesus Cardozo,  [place]  6/11/2024  10:37 AM     This note was completed in part utilizing direct voice dictation software.  Grammatical errors, random word insertions, spelling mistakes, and incomplete sentences may be an occasional consequence of the system secondary to software limitations, ambient noise and hardware issues. Please read the chart carefully and recognize, using context, where substitutions have occurred.

## 2024-06-11 NOTE — ASSESSMENT & PLAN NOTE
Worsening hypoxia requiring MFNC   Possibly in the setting of hypervolemia  CT chest - Small left greater than right basilar effusions with associated compressive atelectasis. Lungs otherwise clear.   Periaortic retroperitoneal, left greater than right iliac chain and inguinal lymph nodes identified, stable and nonspecific  Wean fi02 to maintain sp02 > 92%

## 2024-06-11 NOTE — PROGRESS NOTES
Joaquin Frankel is a 44 y.o. male who is currently ordered Vancomycin IV with management by the Pharmacy Consult service.  Relevant clinical data and objective / subjective history reviewed.  Vancomycin Assessment:  Indication and Goal AUC/Trough: Soft tissue (goal -600, trough >10), -- (Target pre-HD trough level 10-15)  Clinical Status: worsening  Micro:     Renal Function:  SCr: 7.26 mg/dL  CrCl: 21.2 mL/min  Renal replacement: HD schedule switched from MWF to T,,Sat  Days of Therapy: 2  Current Dose: 1250mg IV Post-HD on MWF  Vancomycin Plan:  New Dosinmg IV Post-HD on T,,Sat. Due to short HD yesterday 6/10 - will have HD again today  and will continue with HD ,,Sat scheduled.  Will give a Vanco 1250mg dose today after HD.   Next Level: 24 at 0600  Renal Function Monitoring: Daily BMP and UOP  Pharmacy will continue to follow closely for s/sx of nephrotoxicity, infusion reactions and appropriateness of therapy.  BMP and CBC will be ordered per protocol. We will continue to follow the patient’s culture results and clinical progress daily.    Melodie Arita, Pharmacist

## 2024-06-11 NOTE — PROGRESS NOTES
[facility]  Progress Note  Name: Joaquin Frankel I  MRN: [id number]  Unit/Bed#: ICU 11 I Date of Admission: 6/3/2024   Date of Service: 6/11/2024 I Hospital Day: 8    Assessment & Plan   * CLOVIS (acute kidney injury) ([facility])  Assessment & Plan  Creatine on admission 6.4  Baseline creatine 1.2-1.6  Most recent creatine 9.70  Unclear etiology   6/6 temp HD cath placed  6/7 first iHD   6/8 iHD  6/9 placed on Bumex drip  6/9 started CVVH - remove 50 ml/hour  6/10 transitioned to iHD  Kidney biopsy per nephrology   Monitor strict I&O    SIRS (systemic inflammatory response syndrome) ([facility])  Assessment & Plan  Unclear source of infection   Was being treated for possible lower extremity cellulitis however, site does not appear red or warm; patient reports pain has not improved  6/9 CT LLE - Extensive subcutaneous edema and skin thickening as described above   6/9 CT chest/abd/pelvis - pending   blood cultures pending   Lactic acid negative   Procal continues to rise in the setting of renal failure   Escalate to Zosyn at this time   Trend fever/WBC     Hypertension  Assessment & Plan  OP regimen consists of: carediolol, amlodipine, and lisinopril  Continue to hold lisinopril in the setting of acute renal failure  Continue to hold all anti-hypertensive's in the setting of low BP    Acute respiratory failure with hypoxia ([facility])  Assessment & Plan  Worsening hypoxia requiring MFNC   Possibly in the setting of hypervolemia  CT chest - Small left greater than right basilar effusions with associated compressive atelectasis. Lungs otherwise clear.   Periaortic retroperitoneal, left greater than right iliac chain and inguinal lymph nodes identified, stable and nonspecific  Wean fi02 to maintain sp02 > 92%    Diabetes ([facility])  Assessment & Plan  Continue SSI + lantus  Make adjustments accordingly     Chronic acquired lymphedema  Assessment & Plan  6/9 CT left leg - Extensive subcutaneous edema and skin thickening  as described above     Coronary artery disease  Assessment & Plan  S/p stents in 2020  OP regimen consists of: aspirin and statin  Continue to hold aspirin for planned renal biopsy     Hyponatremia  Assessment & Plan  Likely secondary to hypervolemia   Trend     Morbid obesity (HCC)  Assessment & Plan  Encourage lifestyle modifications on discharge  Nutrition consult     Chest pain  Assessment & Plan  hsTNL negative x3 on admission   Patient with known CAD  ? Related to volume status  Continue to monitor     Anemia of chronic disease  Assessment & Plan  Hemoglobin 6.7 on AM labs  Baseline hemoglobin appears to be around 8  Transfuse to maintain hemoglobin > 7    Wound of abdomen  Assessment & Plan  Wound care nursing following  Continue site care             Disposition: Stepdown Level 1    ICU Core Measures     A: Assess, Prevent, and Manage Pain Has pain been assessed? Yes  Need for changes to pain regimen? Yes   B: Both SAT/SAT  N/A   C: Choice of Sedation RASS Goal: N/A patient not on sedation  Need for changes to sedation or analgesia regimen? Yes   D: Delirium CAM-ICU: Negative   E: Early Mobility  Plan for early mobility? Yes   F: Family Engagement Plan for family engagement today? Yes       Antibiotic Review: Patient on appropriate coverage based on culture data.  and Awaiting culture results.     Review of Invasive Devices:      Central access plan: HD cath in place.  Plan continue       Prophylaxis:  VTE VTE covered by:  heparin (porcine), Subcutaneous, 5,000 Units at 06/10/24 9092       Stress Ulcer  not ordered         Significant 24hr Events     24hr events: No acute events overnight.  Patient refused bipap HS.       Subjective   Review of Systems: Review of Systems   Constitutional:  Positive for fatigue.   HENT: Negative.     Eyes: Negative.    Respiratory:  Positive for shortness of breath.    Cardiovascular: Negative.    Gastrointestinal: Negative.    Endocrine: Negative.    Genitourinary: Negative.     Musculoskeletal: Negative.    Skin: Negative.    Allergic/Immunologic: Negative.    Neurological:  Positive for weakness.   Hematological: Negative.    Psychiatric/Behavioral: Negative.          Objective                            Vitals I/O      Most Recent Min/Max in 24hrs   Temp 98.9 °F (37.2 °C) Temp  Min: 97 °F (36.1 °C)  Max: 99.6 °F (37.6 °C)   Pulse 72 Pulse  Min: 64  Max: 83   Resp 18 Resp  Min: 13  Max: 24   BP (!) 95/48 BP  Min: 82/39  Max: 129/60   O2 Sat 98 % SpO2  Min: 93 %  Max: 100 %      Intake/Output Summary (Last 24 hours) at 6/11/2024 0105  Last data filed at 6/10/2024 2225  Gross per 24 hour   Intake 1388 ml   Output 2937 ml   Net -1549 ml       Diet NPO; Sips with meds  Diet NPO; Sips of clear liquids    Invasive Monitoring           Physical Exam   Physical Exam  Vitals and nursing note reviewed.   Eyes:      General: Vision grossly intact.      Extraocular Movements: Extraocular movements intact.      Conjunctiva/sclera: Conjunctivae normal.      Pupils: Pupils are equal, round, and reactive to light.   Skin:     General: Skin is warm and dry.   HENT:      Head: Normocephalic and atraumatic.      Right Ear: Hearing and tympanic membrane normal.      Left Ear: Hearing and tympanic membrane normal.      Mouth/Throat:      Mouth: Mucous membranes are dry.   Cardiovascular:      Rate and Rhythm: Normal rate and regular rhythm.   Musculoskeletal:         General: Normal range of motion.      Right lower leg: Trace Edema present.      Left lower leg: Trace Edema present.   Abdominal: General: Bowel sounds are normal.      Palpations: Abdomen is soft.   Constitutional:       Appearance: He is ill-appearing and toxic-appearing.   Pulmonary:      Comments: B/L breath sounds clear but diminished   Neurological:      Mental Status: He is alert.      Motor: Strength full and intact in all extremities.        Corneal reflex present, cough reflex and gag reflex intact.   Genitourinary/Anorectal:      Comments: Due to void            Diagnostic Studies      EKG: NSR alarms on   Imaging: XR chest portable    Result Date: 6/9/2024  Impression: Mild pulmonary venous congestion. Nothing to suggest pneumonia. Workstation performed: [id number]     IR temporary dialysis catheter placement    Result Date: 6/7/2024  Impression: 1. Uncomplicated nontunneled/temporary dialysis catheter placement (14 Thai by 24 cm). Catheter tip terminates in the right atrium. The catheter is ready for immediate use. 2. Impaired hemostasis was discussed with Dr. Timmons of Nephrology. DDAVP will be administered to help mitigate rebleeding risk. Workstation performed: [id number]     CT chest abdomen pelvis wo contrast    Result Date: 6/3/2024  Impression: No acute pulmonary pathology. No acute abdominal or pelvic pathology within limitation of a noncontrast study. In particular, no hydronephrosis. Left inguinal and left external iliac lymphadenopathy, increased from May 2022. This may be reactive to a process in the left lower extremity or in the partially imaged pannus. Additional findings as above. Workstation performed: [id number]     NM lung ventilation / perfusion    Result Date: 6/3/2024  Impression: The probability for pulmonary embolus is low. Workstation performed: [id number]     XR chest 1 view portable    Result Date: 6/3/2024  Impression: No acute cardiopulmonary disease. Workstation performed: [id number]    I have personally reviewed pertinent reports.   and I have personally reviewed pertinent films in PACS     Medications:  Scheduled PRN   atorvastatin, 80 mg, Daily  Cholecalciferol, 5,000 Units, Daily  cyanocobalamin, 1,000 mcg, Daily  docusate sodium, 100 mg, BID  heparin (porcine), 5,000 Units, Q8H SHAZIA  insulin lispro, 1-6 Units, HS  insulin lispro, 2-12 Units, TID AC  nystatin, 1 Application, BID  piperacillin-tazobactam, 4.5 g, Q12H  senna, 1 tablet, HS  sodium hypochlorite, 1 Application, Daily  [START ON 6/12/2024]  vancomycin, 10 mg/kg (Adjusted), Once per day on Monday Wednesday Friday      acetaminophen, 650 mg, Q4H PRN  midodrine, 5 mg, Before Dialysis  ondansetron, 4 mg, Q6H PRN  oxyCODONE, 5 mg, Q4H PRN  polyethylene glycol, 17 g, Daily PRN       Continuous          Labs:    CBC    Recent Labs     06/09/24  0450 06/09/24  1538 06/10/24  0607   WBC 13.08*  --  13.22*   HGB 6.7* 7.2* 6.9*   HCT 21.2*  --  22.3*     --  267     BMP    Recent Labs     06/10/24  0607 06/10/24  1249   SODIUM 135 133*   K 4.7 4.7    99   CO2 26 25   AGAP 9 9   BUN 50* 53*   CREATININE 6.98* 7.62*   CALCIUM 7.8* 7.8*       Coags    No recent results     Additional Electrolytes  Recent Labs     06/10/24  0607 06/10/24  1249   MG 2.0 2.0   PHOS 6.3* 6.9*   CAIONIZED 1.10* 1.08*          Blood Gas    Recent Labs     06/10/24  0955   PHART 7.294*   SUX8CEX 52.4*   PO2ART 81.3   FKQ6QNB 24.9   BEART -1.7   SOURCE Radial, Right     Recent Labs     06/09/24  1538 06/10/24  0955   PHVEN 7.197*  --    QFX7YTA 63.9*  --    PO2VEN 84.9*  --    IPD5IJV 24.2  --    BEVEN -4.0  --    P8NWENE 92.3*  --    SOURCE  --  Radial, Right    LFTs  No recent results    Infectious  Recent Labs     06/09/24  0450   PROCALCITONI 10.26*     Glucose  Recent Labs     06/09/24  1954 06/10/24  0024 06/10/24  0607 06/10/24  1249   GLUC 110 109 101 88               PETRA Johnson

## 2024-06-11 NOTE — CASE MANAGEMENT
Case Management Discharge Planning Note    Patient name Joaquin Frankel  Location ICU 11/ICU 11 MRN [id number]  : 1980 Date 2024       Current Admission Date: 6/3/2024  Current Admission Diagnosis:CLOVIS (acute kidney injury) ([facility])   Patient Active Problem List    Diagnosis Date Noted Date Diagnosed    SIRS (systemic inflammatory response syndrome) ([facility]) 2024     Wound of abdomen 2024     Volume overload 2024     Surgical follow-up care 2024     Urinary problem in male 2024     Noncompliance with diet and medication regimen 2024     Epidermoid cyst of skin of scalp 2024     Stopped smoking with greater than 20 pack year history 2024     CKD (chronic kidney disease) stage 4, GFR 15-29 ml/min ([facility]) 2024     ANGY (obstructive sleep apnea) 2024     Acute respiratory failure with hypoxia ([facility]) 10/07/2023     Long term (current) use of immunomodulator 2023     Chronic acquired lymphedema 06/15/2022     History of coronary angioplasty with insertion of stent 2022     Anemia of chronic disease 2022     History of hidradenitis suppurativa 2022     Nocturnal hypoxia 2021     Chest pain 2021     Cellulitis of left lower extremity 2021     Morbid obesity ([facility]) 03/10/2021     Hyponatremia 2021     CLOVIS (acute kidney injury) ([facility]) 2021     Diabetes ([facility]) 2018     Other hyperlipidemia 2017     Hypertension 2016     Coronary artery disease 2014       LOS (days): 8  Geometric Mean LOS (GMLOS) (days): 4.4  Days to GMLOS:-3.9     OBJECTIVE:  Risk of Unplanned Readmission Score: 33.11     Current admission status: Inpatient   Preferred Pharmacy:   [id number] - [place] - [address]  [facility]  [address] [address]  Phone: [phone] Fax: [phone]    [facility] [address] [place] [address]  [address] [place] [address]  [facility] [address]  Phone: [phone] Fax: [phone]    Primary Care Provider: Hany Mcfarland DO    Primary Insurance: BLUE CROSS  Secondary Insurance:     DISCHARGE DETAILS:    Discharge planning discussed with:: Patient  Freedom of Choice: Yes  Comments - Freedom of Choice: SW following to monitor needs and assist with planning.  Pt has started dialysis in hospital and may need to continue as outpatient.  SW met with pt to discuss outpatient dialysis and options. 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  SW discussed possible evening chair times to accommdate work schedule as well as option for PD at home if pt is a candidate.  SW offered to start exploring options for pt by contacting local clinics.  Pt was agreeable but did express that he was feeling very overwhelmed with the situation.  SW offered emotional support and ongoing support to assist with planning.  SW will continue to follow to monitor progress and assist as needed.    Other Referral/Resources/Interventions Provided:  Interventions: Dialysis    Treatment Team Recommendation: Home  Discharge Destination Plan:: Home  Transport at Discharge : Automobile, Self

## 2024-06-11 NOTE — PROGRESS NOTES
NEPHROLOGY PROGRESS NOTE   Joaquin Frankel 44 y.o. male MRN: [id number]  Unit/Bed#: ICU 11 Encounter: [id number]  Reason for Consult: CLOVIS    ASSESSMENT AND PLAN:  43 yo man with PMH of morbid obesity, lower extremity lymphedema, diabetes, hypertension, CAD p/w shortness of breath for the last 2 weeks.  Nephrology is consulted for management of CLOVIS        PLAN:     # Anuric KDIGO CLOVIS stage 3 on CKD G3aA3  Etiology: High suspicious of MGR S with possible multiple myeloma +/- diabetic glomerulopathy however rapid progression it is very uncommon  Baseline creatinine 1.3 to 1.4 mg/dL with previous episodes of CLOVIS  Current creatinine: 6.75 mg/dL, dialysis dependent  Peak creatinine: 10.42  UA: Hematuria, proteinuria  [id number]  Renal imaging : No hydronephrosis  Kidney biopsy requested by Dr. Reyes last week the patient is scheduled for this week as per IR  Treatment:  Had intermittent hemodialysis yesterday, well-tolerated   Off CVVHD   Plan to repeat dialysis today and continue TTS  Maintain MAP:  Over 65 mmHg if possible/avoid hypoperfusion:  Hold parameters on blood pressure medications  Avoid nephrotoxic agents such as NSAIDs, and IV contrast if possible. 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  Patient seen by Dr. Fernández this morning, no bone marrow biopsy for now  Patient is sick and very obese.  Kidney biopsy will be challenging for IR     #DM  HbA1c 5.7  Advised to maintain a good DM control to prevent progression of CKD   Maintain healthy diet (vegetables, fruits, whole grains, nonfat or low fat)  Weight loss  Physical activity (5 to 10 minutes to start the increase to 30 min a day)        # Sepsis  Possible cellulitis  Antibiotics as per primary team       The highlighted and/or bolded points in my assessment, plan, and disposition were discussed with the primary team and they agree with those points and the plan.  Previous records were personally reviewed by me to obtain a baseline creatinine.   The images (CXR) were personally reviewed by me in PACS      SUBJECTIVE:  Patient seen and examined at bedside.  Patient had dialysis yesterday, well-tolerated.  Denies shortness of breath     OBJECTIVE:  Current Weight: Weight - Scale: (!) 186 kg (409 lb 9.8 oz)  Vitals:    06/11/24 0715   BP:    Pulse: 72   Resp: 20   Temp:    SpO2: 96%       Intake/Output Summary (Last 24 hours) at 6/11/2024 0901  Last data filed at 6/11/2024 0059  Gross per 24 hour   Intake 1550 ml   Output 2170 ml   Net -620 ml     Wt Readings from Last 3 Encounters:   06/11/24 (!) 186 kg (409 lb 9.8 oz)   05/30/24 (!) 187 kg (413 lb)   05/22/24 (!) 189 kg (417 lb)     Temp Readings from Last 3 Encounters:   06/11/24 98.6 °F (37 °C) (Tympanic)   05/30/24 (!) 97 °F (36.1 °C) (Core)   05/16/24 (!) 97 °F (36.1 °C) (Core)     BP Readings from Last 3 Encounters:   06/11/24 112/61   05/22/24 134/74   05/16/24 144/72     Pulse Readings from Last 3 Encounters:   06/11/24 72   05/22/24 76   05/16/24 59        General:  no acute distress at this time, obese  Skin:  No acute rash  Eyes:  No scleral icterus and  noninjected  ENT:  mucous membranes moist  Neck:  no carotid bruits  Chest:  Clear to auscultation percussion, good respiratory effort, no use of accessory respiratory muscles  CVS:  Regular rate and rhythm without rub   Abdomen:  soft and nontender   Extremities: lower extremity edema L>R  Neuro:  No gross focality  Psych:  Alert , cooperative   Vascular access: Temporary HD line      Medications:    Current Facility-Administered Medications:     acetaminophen (TYLENOL) tablet 650 mg, 650 mg, Oral, Q4H PRN, PETRA Chung    atorvastatin (LIPITOR) tablet 80 mg, 80 mg, Oral, Daily, PERTA Chung, 80 mg at 06/10/24 0828    Cholecalciferol (VITAMIN D3) tablet 5,000 Units, 5,000 Units, Oral, Daily, PETRA Chung, 5,000 Units at 06/10/24 0828    cyanocobalamin (VITAMIN B-12) tablet 1,000 mcg, 1,000 mcg, Oral, Daily, PETRA Chung, 1,000 mcg at 06/10/24 0828    docusate sodium (COLACE) capsule 100 mg, 100 mg, Oral, BID, PETRA Chung, 100 mg at 06/10/24 1819    heparin (porcine) subcutaneous injection 5,000 Units, 5,000 Units, Subcutaneous, Q8H SHAZIA, PETRA Chung, 5,000 Units at 06/11/24 0556    insulin lispro (HumALOG/ADMELOG) 100 units/mL subcutaneous injection 1-6 Units, 1-6 Units, Subcutaneous, HS, PETRA Chung, 1 Units at 06/06/24 2150    insulin lispro (HumALOG/ADMELOG) 100 units/mL subcutaneous injection 2-12 Units, 2-12 Units, Subcutaneous, TID AC, 2 Units at 06/08/24 1720 **AND** Fingerstick Glucose (POCT), , , TID AC, PETRA Chung    midodrine (PROAMATINE) tablet 5 mg, 5 mg, Oral, Before Dialysis, PETRA Chung, 5 mg at 06/10/24 1950    nystatin (MYCOSTATIN) powder 1 Application, 1 Application, Topical, BID, PETRA Chung, 1 Application at 06/10/24 1819    ondansetron (ZOFRAN) injection 4 mg, 4 mg, Intravenous, Q6H PRN, PETRA Chung, 4 mg at 06/09/24 1000     oxyCODONE (ROXICODONE) IR tablet 5 mg, 5 mg, Oral, Q4H PRN, PETRA Chung, 5 mg at 06/10/24 0620    [COMPLETED] piperacillin-tazobactam (ZOSYN) 4.5 g in sodium chloride 0.9 % 100 mL IV LOADING DOSE, 4.5 g, Intravenous, Once, Stopped at 06/09/24 1900 **FOLLOWED BY** piperacillin-tazobactam (ZOSYN) 4.5 g in sodium chloride 0.9 % 100 mL IVPB (EXTENDED INFUSION), 4.5 g, Intravenous, Q12H, PETRA Chung, Last Rate: 25 mL/hr at 06/11/24 0113, 4.5 g at 06/11/24 0113    polyethylene glycol (MIRALAX) packet 17 g, 17 g, Oral, Daily PRN, PETRA Chung    senna (SENOKOT) tablet 8.6 mg, 1 tablet, Oral, HS, PETRA Chung, 8.6 mg at 06/10/24 2259    sodium hypochlorite (DAKIN'S HALF-STRENGTH) 0.25 percent topical solution 1 Application, 1 Application, Irrigation, Daily, PETRA Chung, 1 Application at 06/09/24 1013    [START ON 6/12/2024] vancomycin (VANCOCIN) 1,250 mg in sodium chloride 0.9 % 250 mL IVPB, 10 mg/kg (Adjusted), Intravenous, Once per day on Monday Wednesday Friday, PETRA Chung    Laboratory Results:  Results from last 7 days   Lab Units 06/11/24  0607 06/10/24  1249 06/10/24  0607 06/10/24  0024 06/09/24  1954 06/09/24  1538 06/09/24  0450 06/08/24  0558 06/07/24  0846 06/07/24  0846 06/06/24  0312 06/05/24  0619   WBC Thousand/uL 11.26*  --  13.22*  --   --   --  13.08* 14.64*  --  14.53* 9.66 9.15   HEMOGLOBIN g/dL 6.9*  --  6.9*  --   --  7.2* 6.7* 8.0*  --  7.7* 8.0* 8.5*   HEMATOCRIT % 22.0*  --  22.3*  --   --   --  21.2* 26.0*  --  24.5* 25.0* 27.0*   PLATELETS Thousands/uL 317  --  267  --   --   --  281 309  --  316 306 317   SODIUM mmol/L 133* 133* 135 133* 134* 134* 132* 132*  --  133* 133* 137   POTASSIUM mmol/L 4.2 4.7 4.7 4.5 4.5 4.7 4.5 4.9  --  4.5 4.1 4.2   CHLORIDE mmol/L 98 99 100 99 98 96 97 98  --  100 101 103   CO2 mmol/L 25 25 26 23 25 25 24 18*  --  20* 20* 24   BUN mg/dL 46* 53* 50* 64* 66* 73* 71* 77*  --   91* 73* 71*   CREATININE mg/dL 6.75* 7.62* 6.98* 8.31* 9.12* 10.42* 9.70* 9.55*  --  9.59* 8.10* 7.28*   CALCIUM mg/dL 7.7* 7.8* 7.8* 7.7* 7.1* 7.1* 7.2* 7.2*  --  7.2* 7.6* 7.8*   MAGNESIUM mg/dL 2.0 2.0 2.0 1.9 1.8* 1.9 1.6* 1.7*   < >  --   --   --    PHOSPHORUS mg/dL 6.4* 6.9* 6.3* 7.7* 8.5* 10.2*  --   --   --   --   --   --     < > = values in this interval not displayed.       XR chest portable ICU   Final Result by Daryl Mccarthy MD (06/11 0811)      1.  Right internal jugular dialysis catheter projects over the SVC. No pneumothorax.      2.  Cardiomegaly with vascular congestion.            Workstation performed: [id number]         CT lower extremity wo contrast left   Final Result by Jimbo Camacho MD (06/09 2045)      Extensive subcutaneous edema and skin thickening as described above. This may either reflect bland edema or extensive cellulitis. A well-defined rim-enhancing fluid collection not seen to suggest a discrete abscess. No soft tissue gas.      Osteoarthrosis as above. No osseous erosion.         Workstation performed: [id number]         CT chest abdomen pelvis wo contrast   Final Result by Allen Mcgowan MD (06/10 0804)   1. Small left greater than right basilar effusions with associated compressive atelectasis. Lungs otherwise clear.   2. Periaortic retroperitoneal, left greater than right iliac chain and inguinal lymph nodes identified, stable and nonspecific.   3. No acute findings.                     Workstation performed: [id number]         XR chest portable   Final Result by Danyell Ford MD (06/09 2240)      Mild pulmonary venous congestion.      Nothing to suggest pneumonia.            Workstation performed: [id number]         IR temporary dialysis catheter placement   Final Result by Radames Liu MD (06/07 1627)   1. Uncomplicated nontunneled/temporary dialysis catheter placement (14 [place] by 24 cm). Catheter tip terminates in the right atrium. The catheter is  "ready for immediate use.      2. Impaired hemostasis was discussed with Dr. Timmons of Nephrology. DDAVP will be administered to help mitigate rebleeding risk.      Workstation performed: [id number]         CT chest abdomen pelvis wo contrast   Final Result by Margoth Arango MD (06/03 2175)      No acute pulmonary pathology.      No acute abdominal or pelvic pathology within limitation of a noncontrast study. In particular, no hydronephrosis.      Left inguinal and left external iliac lymphadenopathy, increased from May 2022. This may be reactive to a process in the left lower extremity or in the partially imaged pannus.      Additional findings as above.                  Workstation performed: [id number]         NM lung ventilation / perfusion   Final Result by Daniel Ackerman MD (06/03 1412)      The probability for pulmonary embolus is low.      Workstation performed: [id number]         XR chest 1 view portable   ED Interpretation by Kit Antoine MD (06/03 0753)   Mild pulmonary edema right worse than left, no additional acute cardiopulmonary abnormality      Final Result by Miah Toledo MD (06/03 0841)      No acute cardiopulmonary disease.            Workstation performed: [id number]          VAS VENOUS DUPLEX - LOWER LIMB BILATERAL    (Results Pending)   IR biopsy kidney random    (Results Pending)       Portions of the record may have been created with voice recognition software. Occasional wrong word or \"sound a like\" substitutions may have occurred due to the inherent limitations of voice recognition software. Read the chart carefully and recognize, using context, where substitutions have occurred.    "

## 2024-06-11 NOTE — PLAN OF CARE
Patient presents for a 3.5 hour HD session on a 3K2.5Ca bath for a serum potassium of 4.2 mmol/L drawn today with a net UF goal of 1-1.5 L as tolerated.    Post-Dialysis RN Treatment Note    Blood Pressure:  Pre 100/49 mm/Hg  Post 109/60 mmHg   EDW  TBD kg    Weight:  Pre 182.5 kg   Post 182.3 kg   Mode of weight measurement: Bed Scale   Volume Removed  222 ml    Treatment duration 90 minutes    NS given  No    Treatment shortened? Yes, describe: system clotted; Tx ended per Dr. Reyes   Medications given during Rx 5 mg of midodrine   Estimated Kt/V  Not Applicable   Access type: Temporary HD catheter   Access Issues: No    Report called to primary nurse   Yes, Flower Orlando RN    Problem: METABOLIC, FLUID AND ELECTROLYTES - ADULT  Goal: Electrolytes maintained within normal limits  Description: INTERVENTIONS:  - Monitor labs and assess patient for signs and symptoms of electrolyte imbalances  - Administer electrolyte replacement as ordered  - Monitor response to electrolyte replacements, including repeat lab results as appropriate  - Instruct patient on fluid and nutrition as appropriate  Outcome: Progressing  Goal: Fluid balance maintained  Description: INTERVENTIONS:  - Monitor labs   - Monitor I/O and WT  - Instruct patient on fluid and nutrition as appropriate  - Assess for signs & symptoms of volume excess or deficit  Outcome: Progressing

## 2024-06-12 ENCOUNTER — APPOINTMENT (INPATIENT)
Dept: RADIOLOGY | Facility: HOSPITAL | Age: 44
DRG: 673 | End: 2024-06-12
Attending: RADIOLOGY
Payer: COMMERCIAL

## 2024-06-12 ENCOUNTER — ANESTHESIA (INPATIENT)
Dept: RADIOLOGY | Facility: HOSPITAL | Age: 44
End: 2024-06-12
Payer: COMMERCIAL

## 2024-06-12 ENCOUNTER — ANESTHESIA EVENT (INPATIENT)
Dept: RADIOLOGY | Facility: HOSPITAL | Age: 44
End: 2024-06-12
Payer: COMMERCIAL

## 2024-06-12 ENCOUNTER — APPOINTMENT (INPATIENT)
Dept: RADIOLOGY | Facility: HOSPITAL | Age: 44
DRG: 673 | End: 2024-06-12
Payer: COMMERCIAL

## 2024-06-12 LAB
ABO GROUP BLD BPU: NORMAL
ABO GROUP BLD: NORMAL
ANION GAP SERPL CALCULATED.3IONS-SCNC: 1 MMOL/L (ref 4–13)
ANISOCYTOSIS BLD QL SMEAR: PRESENT
BACTERIA BLD CULT: NORMAL
BACTERIA BLD CULT: NORMAL
BASO STIPL BLD QL SMEAR: PRESENT
BASOPHILS # BLD AUTO: 0.1 THOUSAND/UL (ref 0–0.1)
BASOPHILS NFR MAR MANUAL: 1 % (ref 0–1)
BLD GP AB SCN SERPL QL: NEGATIVE
BPU ID: NORMAL
BUN SERPL-MCNC: 52 MG/DL (ref 5–25)
CALCIUM SERPL-MCNC: 7.9 MG/DL (ref 8.4–10.2)
CHLORIDE SERPL-SCNC: 101 MMOL/L (ref 96–108)
CO2 SERPL-SCNC: 26 MMOL/L (ref 21–32)
CORTIS SERPL-MCNC: 20.1 UG/DL
CREAT SERPL-MCNC: 7.1 MG/DL (ref 0.6–1.3)
CROSSMATCH: NORMAL
EOSINOPHIL # BLD AUTO: 0.51 THOUSAND/UL (ref 0–0.61)
EOSINOPHIL NFR BLD MANUAL: 5 % (ref 0–6)
ERYTHROCYTE [DISTWIDTH] IN BLOOD BY AUTOMATED COUNT: 14.6 % (ref 11.6–15.1)
GFR SERPL CREATININE-BSD FRML MDRD: 8 ML/MIN/1.73SQ M
GLUCOSE SERPL-MCNC: 100 MG/DL (ref 65–140)
GLUCOSE SERPL-MCNC: 107 MG/DL (ref 65–140)
GLUCOSE SERPL-MCNC: 123 MG/DL (ref 65–140)
GLUCOSE SERPL-MCNC: 127 MG/DL (ref 65–140)
GLUCOSE SERPL-MCNC: 98 MG/DL (ref 65–140)
HCT VFR BLD AUTO: 23 % (ref 36.5–49.3)
HGB BLD-MCNC: 7.3 G/DL (ref 12–17)
INR PPP: 1.13 (ref 0.84–1.19)
LYMPHOCYTES # BLD AUTO: 0.51 THOUSAND/UL (ref 0.6–4.47)
LYMPHOCYTES # BLD AUTO: 5 %
MAGNESIUM SERPL-MCNC: 2.1 MG/DL (ref 1.9–2.7)
MCH RBC QN AUTO: 28.3 PG (ref 26.8–34.3)
MCHC RBC AUTO-ENTMCNC: 31.7 G/DL (ref 31.4–37.4)
MCV RBC AUTO: 89 FL (ref 82–98)
MONOCYTES # BLD AUTO: 0.41 THOUSAND/UL (ref 0–1.22)
MONOCYTES NFR BLD AUTO: 4 % (ref 4–12)
NEUTS BAND NFR BLD MANUAL: 1 % (ref 0–8)
NEUTS SEG # BLD: 8.69 THOUSAND/UL (ref 1.81–6.82)
NEUTS SEG NFR BLD AUTO: 84 %
PHOSPHATE SERPL-MCNC: 5.6 MG/DL (ref 2.7–4.5)
PLATELET # BLD AUTO: 298 THOUSANDS/UL (ref 149–390)
PLATELET BLD QL SMEAR: ADEQUATE
PMV BLD AUTO: 8.4 FL (ref 8.9–12.7)
POTASSIUM SERPL-SCNC: 4 MMOL/L (ref 3.5–5.3)
PROTHROMBIN TIME: 14.8 SECONDS (ref 11.6–14.5)
RBC # BLD AUTO: 2.58 MILLION/UL (ref 3.88–5.62)
RBC MORPH BLD: PRESENT
RH BLD: POSITIVE
SODIUM SERPL-SCNC: 128 MMOL/L (ref 135–147)
SPECIMEN EXPIRATION DATE: NORMAL
TOTAL CELLS COUNTED SPEC: 100
UNIT DISPENSE STATUS: NORMAL
UNIT PRODUCT CODE: NORMAL
UNIT PRODUCT VOLUME: 350 ML
UNIT RH: NORMAL
WBC # BLD AUTO: 10.22 THOUSAND/UL (ref 4.31–10.16)

## 2024-06-12 PROCEDURE — 99232 SBSQ HOSP IP/OBS MODERATE 35: CPT | Performed by: INTERNAL MEDICINE

## 2024-06-12 PROCEDURE — 88341 IMHCHEM/IMCYTCHM EA ADD ANTB: CPT | Performed by: STUDENT IN AN ORGANIZED HEALTH CARE EDUCATION/TRAINING PROGRAM

## 2024-06-12 PROCEDURE — 86850 RBC ANTIBODY SCREEN: CPT | Performed by: NURSE PRACTITIONER

## 2024-06-12 PROCEDURE — 83735 ASSAY OF MAGNESIUM: CPT | Performed by: NURSE PRACTITIONER

## 2024-06-12 PROCEDURE — 88264 CHROMOSOME ANALYSIS 20-25: CPT | Performed by: INTERNAL MEDICINE

## 2024-06-12 PROCEDURE — 88365 INSITU HYBRIDIZATION (FISH): CPT | Performed by: STUDENT IN AN ORGANIZED HEALTH CARE EDUCATION/TRAINING PROGRAM

## 2024-06-12 PROCEDURE — 81455 SO/HL 51/>GSAP DNA/DNA&RNA: CPT | Performed by: INTERNAL MEDICINE

## 2024-06-12 PROCEDURE — 80048 BASIC METABOLIC PNL TOTAL CA: CPT | Performed by: NURSE PRACTITIONER

## 2024-06-12 PROCEDURE — 94660 CPAP INITIATION&MGMT: CPT

## 2024-06-12 PROCEDURE — 88185 FLOWCYTOMETRY/TC ADD-ON: CPT | Performed by: INTERNAL MEDICINE

## 2024-06-12 PROCEDURE — 94760 N-INVAS EAR/PLS OXIMETRY 1: CPT

## 2024-06-12 PROCEDURE — 88291 CYTO/MOLECULAR REPORT: CPT | Performed by: INTERNAL MEDICINE

## 2024-06-12 PROCEDURE — 85610 PROTHROMBIN TIME: CPT | Performed by: NURSE PRACTITIONER

## 2024-06-12 PROCEDURE — 88364 INSITU HYBRIDIZATION (FISH): CPT | Performed by: STUDENT IN AN ORGANIZED HEALTH CARE EDUCATION/TRAINING PROGRAM

## 2024-06-12 PROCEDURE — 97163 PT EVAL HIGH COMPLEX 45 MIN: CPT

## 2024-06-12 PROCEDURE — 88342 IMHCHEM/IMCYTCHM 1ST ANTB: CPT | Performed by: STUDENT IN AN ORGANIZED HEALTH CARE EDUCATION/TRAINING PROGRAM

## 2024-06-12 PROCEDURE — 86900 BLOOD TYPING SEROLOGIC ABO: CPT | Performed by: NURSE PRACTITIONER

## 2024-06-12 PROCEDURE — 88237 TISSUE CULTURE BONE MARROW: CPT | Performed by: INTERNAL MEDICINE

## 2024-06-12 PROCEDURE — 85027 COMPLETE CBC AUTOMATED: CPT | Performed by: NURSE PRACTITIONER

## 2024-06-12 PROCEDURE — 82948 REAGENT STRIP/BLOOD GLUCOSE: CPT

## 2024-06-12 PROCEDURE — 38222 DX BONE MARROW BX & ASPIR: CPT | Performed by: RADIOLOGY

## 2024-06-12 PROCEDURE — 88305 TISSUE EXAM BY PATHOLOGIST: CPT | Performed by: STUDENT IN AN ORGANIZED HEALTH CARE EDUCATION/TRAINING PROGRAM

## 2024-06-12 PROCEDURE — 85097 BONE MARROW INTERPRETATION: CPT | Performed by: STUDENT IN AN ORGANIZED HEALTH CARE EDUCATION/TRAINING PROGRAM

## 2024-06-12 PROCEDURE — 97167 OT EVAL HIGH COMPLEX 60 MIN: CPT

## 2024-06-12 PROCEDURE — 88311 DECALCIFY TISSUE: CPT | Performed by: STUDENT IN AN ORGANIZED HEALTH CARE EDUCATION/TRAINING PROGRAM

## 2024-06-12 PROCEDURE — 88184 FLOWCYTOMETRY/ TC 1 MARKER: CPT | Performed by: INTERNAL MEDICINE

## 2024-06-12 PROCEDURE — 84100 ASSAY OF PHOSPHORUS: CPT

## 2024-06-12 PROCEDURE — C1830 POWER BONE MARROW BX NEEDLE: HCPCS

## 2024-06-12 PROCEDURE — 079T3ZX DRAINAGE OF BONE MARROW, PERCUTANEOUS APPROACH, DIAGNOSTIC: ICD-10-PCS | Performed by: RADIOLOGY

## 2024-06-12 PROCEDURE — 85007 BL SMEAR W/DIFF WBC COUNT: CPT | Performed by: RADIOLOGY

## 2024-06-12 PROCEDURE — 99232 SBSQ HOSP IP/OBS MODERATE 35: CPT | Performed by: STUDENT IN AN ORGANIZED HEALTH CARE EDUCATION/TRAINING PROGRAM

## 2024-06-12 PROCEDURE — 88313 SPECIAL STAINS GROUP 2: CPT | Performed by: STUDENT IN AN ORGANIZED HEALTH CARE EDUCATION/TRAINING PROGRAM

## 2024-06-12 PROCEDURE — 07DR3ZX EXTRACTION OF ILIAC BONE MARROW, PERCUTANEOUS APPROACH, DIAGNOSTIC: ICD-10-PCS | Performed by: RADIOLOGY

## 2024-06-12 PROCEDURE — 77012 CT SCAN FOR NEEDLE BIOPSY: CPT | Performed by: RADIOLOGY

## 2024-06-12 PROCEDURE — 82533 TOTAL CORTISOL: CPT | Performed by: NURSE PRACTITIONER

## 2024-06-12 PROCEDURE — 86901 BLOOD TYPING SEROLOGIC RH(D): CPT | Performed by: NURSE PRACTITIONER

## 2024-06-12 RX ORDER — BUMETANIDE 1 MG/1
2 TABLET ORAL DAILY
Status: DISCONTINUED | OUTPATIENT
Start: 2024-06-12 | End: 2024-06-14

## 2024-06-12 RX ORDER — CEFAZOLIN SODIUM 2 G/50ML
2000 SOLUTION INTRAVENOUS EVERY 12 HOURS
Status: DISCONTINUED | OUTPATIENT
Start: 2024-06-12 | End: 2024-06-13

## 2024-06-12 RX ORDER — LIDOCAINE WITH 8.4% SOD BICARB 0.9%(10ML)
SYRINGE (ML) INJECTION AS NEEDED
Status: COMPLETED | OUTPATIENT
Start: 2024-06-12 | End: 2024-06-12

## 2024-06-12 RX ADMIN — HEPARIN SODIUM 5000 UNITS: 5000 INJECTION, SOLUTION INTRAVENOUS; SUBCUTANEOUS at 21:06

## 2024-06-12 RX ADMIN — ACETAMINOPHEN 650 MG: 325 TABLET ORAL at 11:46

## 2024-06-12 RX ADMIN — HYOSCYAMINE SULFATE 1 APPLICATION: 16 SOLUTION at 10:22

## 2024-06-12 RX ADMIN — Medication 2.5 MG: at 10:21

## 2024-06-12 RX ADMIN — Medication 5000 UNITS: at 10:21

## 2024-06-12 RX ADMIN — ACETAMINOPHEN 650 MG: 325 TABLET ORAL at 06:35

## 2024-06-12 RX ADMIN — BUMETANIDE 2 MG: 1 TABLET ORAL at 10:21

## 2024-06-12 RX ADMIN — ACETAMINOPHEN 650 MG: 325 TABLET ORAL at 17:06

## 2024-06-12 RX ADMIN — DOCUSATE SODIUM 100 MG: 100 CAPSULE, LIQUID FILLED ORAL at 10:21

## 2024-06-12 RX ADMIN — CYANOCOBALAMIN TAB 500 MCG 1000 MCG: 500 TAB at 10:21

## 2024-06-12 RX ADMIN — NYSTATIN 1 APPLICATION: 100000 POWDER TOPICAL at 10:26

## 2024-06-12 RX ADMIN — GABAPENTIN 300 MG: 300 CAPSULE ORAL at 17:06

## 2024-06-12 RX ADMIN — Medication 2.5 MG: at 15:58

## 2024-06-12 RX ADMIN — CEFAZOLIN SODIUM 2000 MG: 2 SOLUTION INTRAVENOUS at 21:06

## 2024-06-12 RX ADMIN — GABAPENTIN 300 MG: 300 CAPSULE ORAL at 10:21

## 2024-06-12 RX ADMIN — Medication 10 ML: at 09:23

## 2024-06-12 RX ADMIN — ATORVASTATIN CALCIUM 80 MG: 80 TABLET, FILM COATED ORAL at 10:21

## 2024-06-12 RX ADMIN — DOCUSATE SODIUM 100 MG: 100 CAPSULE, LIQUID FILLED ORAL at 17:06

## 2024-06-12 RX ADMIN — ONDANSETRON 4 MG: 2 INJECTION INTRAMUSCULAR; INTRAVENOUS at 08:40

## 2024-06-12 RX ADMIN — NYSTATIN 1 APPLICATION: 100000 POWDER TOPICAL at 17:06

## 2024-06-12 RX ADMIN — PIPERACILLIN AND TAZOBACTAM 4.5 G: 4; .5 INJECTION, POWDER, FOR SOLUTION INTRAVENOUS at 11:46

## 2024-06-12 RX ADMIN — Medication 2.5 MG: at 20:57

## 2024-06-12 NOTE — ASSESSMENT & PLAN NOTE
Lab Results   Component Value Date    HGBA1C 5.7 (H) 06/09/2024     Recent Labs     06/12/24  0756 06/12/24  1131 06/12/24  1557 06/12/24 2051   POCGLU 107 98 127 123     Blood Sugar Average: Last 72 hrs:  (P) 102.5    Amaryl held during his hospitalization.   Acceptable, continue to monitor on current regimen  Continue diabetic diet.

## 2024-06-12 NOTE — PLAN OF CARE
Problem: OCCUPATIONAL THERAPY ADULT  Goal: Performs self-care activities at highest level of function for planned discharge setting.  See evaluation for individualized goals.  Description: Treatment Interventions: ADL retraining, Endurance training, Equipment evaluation/education, Patient/family training, Functional transfer training, Compensatory technique education, Continued evaluation, Energy conservation, Activityengagement          See flowsheet documentation for full assessment, interventions and recommendations.   Note: Limitation: Decreased ADL status, Decreased endurance, Decreased self-care trans, Decreased high-level ADLs (impaired activity tolerance, pain, forward functional reach, standing tolerance, balance, LE strength, LE edema)     Assessment: Pt is a 43yo male [facility] w/ chest pain and shortness of breath. Diagnosed w/ CLOVIS. Pt is s/p IR bone marrow biopsy on 6/12/24 and s/p temporary HD catheter placement on 6/6/24. Pt reports I w/ ADLs / IADLs at baseline at home w/ 20yo son in apt w/ + ADRIANA. Significant PMH Impacting his occupational performance includes obesity, arthritis, CAD, DM, diabetic neuropathy, HTn, hx MI (2020), multiple L LE wound debridement. Upon eval, pt alert and oriented. Able to follow directions and communicate wants / needs on acute O2. Pt required mod I grooming seated, S UBD seated, max A LBD, min A sit to stand, and min A using RW functional mobility < than household distances. Pt returned to EOB due to weakness / dizziness. /78. Pt is completing ADLs below baseline level of I and would benefit from OT in acute care to max I w/ ADLs and improve engagement. Recommend level II rehab resource intensity when medically stable for discharge from acute care pend ability to manage stairs, improved activity tolerance, and medical optimization. Will continue to follow     Rehab Resource Intensity Level, OT: III (Minimum Resource Intensity)

## 2024-06-12 NOTE — PLAN OF CARE
Problem: Prexisting or High Potential for Compromised Skin Integrity  Goal: Skin integrity is maintained or improved  Description: INTERVENTIONS:  - Identify patients at risk for skin breakdown  - Assess and monitor skin integrity  - Assess and monitor nutrition and hydration status  - Monitor labs   - Assess for incontinence   - Turn and reposition patient  - Assist with mobility/ambulation  - Relieve pressure over bony prominences  - Avoid friction and shearing  - Provide appropriate hygiene as needed including keeping skin clean and dry  - Evaluate need for skin moisturizer/barrier cream  - Collaborate with interdisciplinary team   - Patient/family teaching  - Consider wound care consult   Outcome: Progressing     Problem: PAIN - ADULT  Goal: Verbalizes/displays adequate comfort level or baseline comfort level  Description: Interventions:  - Encourage patient to monitor pain and request assistance  - Assess pain using appropriate pain scale  - Administer analgesics based on type and severity of pain and evaluate response  - Implement non-pharmacological measures as appropriate and evaluate response  - Consider cultural and social influences on pain and pain management  - Notify physician/advanced practitioner if interventions unsuccessful or patient reports new pain  Outcome: Progressing     Problem: INFECTION - ADULT  Goal: Absence or prevention of progression during hospitalization  Description: INTERVENTIONS:  - Assess and monitor for signs and symptoms of infection  - Monitor lab/diagnostic results  - Monitor all insertion sites, i.e. indwelling lines, tubes, and drains  - [place] appropriate cooling/warming therapies per order  - Administer medications as ordered  - Instruct and encourage patient and family to use good hand hygiene technique  - Identify and instruct in appropriate isolation precautions for identified infection/condition  Outcome: Progressing     Problem: SAFETY ADULT  Goal: Patient will  remain free of falls  Description: INTERVENTIONS:  - Educate patient/family on patient safety including physical limitations  - Instruct patient to call for assistance with activity   - Consult OT/PT to assist with strengthening/mobility   - Keep Call bell within reach  - Keep bed low and locked with side rails adjusted as appropriate  - Keep care items and personal belongings within reach  - Initiate and maintain comfort rounds  - Make Fall Risk Sign visible to staff  - Apply yellow socks and bracelet for high fall risk patients  - Consider moving patient to room near nurses station  Outcome: Progressing  Goal: Maintain or return to baseline ADL function  Description: INTERVENTIONS:  -  Assess patient's ability to carry out ADLs; assess patient's baseline for ADL function and identify physical deficits which impact ability to perform ADLs (bathing, care of mouth/teeth, toileting, grooming, dressing, etc.)  - Assess/evaluate cause of self-care deficits   - Assess range of motion  - Assess patient's mobility; develop plan if impaired  - Assess patient's need for assistive devices and provide as appropriate  - Encourage maximum independence but intervene and supervise when necessary  - Involve family in performance of ADLs  - Assess for home care needs following discharge   - Consider OT consult to assist with ADL evaluation and planning for discharge  - Provide patient education as appropriate  Outcome: Progressing  Goal: Maintains/Returns to pre admission functional level  Description: INTERVENTIONS:  - Perform AM-PAC 6 Click Basic Mobility/ Daily Activity assessment daily.  - Set and communicate daily mobility goal to care team and patient/family/caregiver.   - Collaborate with rehabilitation services on mobility goals if consulted  - Out of bed for toileting  - Record patient progress and toleration of activity level   Outcome: Progressing     Problem: CARDIOVASCULAR - ADULT  Goal: Maintains optimal cardiac output  and hemodynamic stability  Description: INTERVENTIONS:  - Monitor I/O, vital signs and rhythm  - Monitor for S/S and trends of decreased cardiac output  - Administer and titrate ordered vasoactive medications to optimize hemodynamic stability  - Assess quality of pulses, skin color and temperature  - Assess for signs of decreased coronary artery perfusion  - Instruct patient to report change in severity of symptoms  Outcome: Progressing  Goal: Absence of cardiac dysrhythmias or at baseline rhythm  Description: INTERVENTIONS:  - Continuous cardiac monitoring, vital signs, obtain 12 lead EKG if ordered  - Administer antiarrhythmic and heart rate control medications as ordered  - Monitor electrolytes and administer replacement therapy as ordered  Outcome: Progressing     Problem: RESPIRATORY - ADULT  Goal: Achieves optimal ventilation and oxygenation  Description: INTERVENTIONS:  - Assess for changes in respiratory status  - Assess for changes in mentation and behavior  - Position to facilitate oxygenation and minimize respiratory effort  - Oxygen administered by appropriate delivery if ordered  - Initiate smoking cessation education as indicated  - Encourage broncho-pulmonary hygiene including cough, deep breathe, Incentive Spirometry  - Assess the need for suctioning and aspirate as needed  - Assess and instruct to report SOB or any respiratory difficulty  - Respiratory Therapy support as indicated  Outcome: Progressing     Problem: SKIN/TISSUE INTEGRITY - ADULT  Goal: Incision(s), wounds(s) or drain site(s) healing without S/S of infection  Description: INTERVENTIONS  - Assess and document dressing, incision, wound bed, drain sites and surrounding tissue  - Provide patient and family education  - Perform skin care/dressing changes  Outcome: Progressing  Goal: Skin Integrity remains intact(Skin Breakdown Prevention)  Description: Assess:  -Perform Juan assessment  -Clean and moisturize skin   -Inspect skin when  repositioning, toileting, and assisting with ADLS  -Assess under medical devices    Bed Management:  -Have minimal linens on bed & keep smooth, unwrinkled  -Change linens as needed when moist or perspiring    Skin Care:  -Avoid use of baby powder, tape, friction and shearing, hot water or constrictive clothing  -Relieve pressure over bony prominences  -Do not massage red bony areas  Outcome: Progressing     Problem: NEUROSENSORY - ADULT  Goal: Achieves maximal functionality and self care  Description: INTERVENTIONS  - Monitor swallowing and airway patency with patient fatigue and changes in neurological status  - Encourage and assist patient to increase activity and self care.   - Encourage visually impaired, hearing impaired and aphasic patients to use assistive/communication devices  Outcome: Progressing     Problem: METABOLIC, FLUID AND ELECTROLYTES - ADULT  Goal: Electrolytes maintained within normal limits  Description: INTERVENTIONS:  - Monitor labs and assess patient for signs and symptoms of electrolyte imbalances  - Administer electrolyte replacement as ordered  - Monitor response to electrolyte replacements, including repeat lab results as appropriate  - Instruct patient on fluid and nutrition as appropriate  Outcome: Progressing  Goal: Fluid balance maintained  Description: INTERVENTIONS:  - Monitor labs   - Monitor I/O and WT  - Instruct patient on fluid and nutrition as appropriate  - Assess for signs & symptoms of volume excess or deficit  Outcome: Progressing  Goal: Glucose maintained within target range  Description: INTERVENTIONS:  - Monitor Blood Glucose as ordered  - Assess for signs and symptoms of hyperglycemia and hypoglycemia  - Administer ordered medications to maintain glucose within target range  - Assess nutritional intake and initiate nutrition service referral as needed  Outcome: Progressing     Problem: MUSCULOSKELETAL - ADULT  Goal: Maintain or return mobility to safest level of  function  Description: INTERVENTIONS:  - Assess patient's ability to carry out ADLs; assess patient's baseline for ADL function and identify physical deficits which impact ability to perform ADLs (bathing, care of mouth/teeth, toileting, grooming, dressing, etc.)  - Assess/evaluate cause of self-care deficits   - Assess range of motion  - Assess patient's mobility  - Assess patient's need for assistive devices and provide as appropriate  - Encourage maximum independence but intervene and supervise when necessary  - Involve family in performance of ADLs  - Assess for home care needs following discharge   - Consider OT consult to assist with ADL evaluation and planning for discharge  - Provide patient education as appropriate  Outcome: Progressing  Goal: Maintain proper alignment of affected body part  Description: INTERVENTIONS:  - Support, maintain and protect limb and body alignment  - Provide patient/ family with appropriate education  Outcome: Progressing

## 2024-06-12 NOTE — DISCHARGE INSTRUCTIONS
Bone Marrow Biopsy     WHAT YOU NEED TO KNOW:   A bone marrow biopsy is a procedure to remove a small amount of bone marrow from your bone. Bone marrow is the soft tissue inside your bone that helps to make blood cells. The sample is tested for disease or infection.    DISCHARGE INSTRUCTIONS:     1. Limit your activities day of biopsy as directed by your doctor.    2. Use medication as ordered.    3. Return to your normal diet.Small sips of flat soda will help with nausea.    4. Remove band-aid or dressing 24 hours after procedure.    Contact Interventional Radiology at [phone] (ANDRES PATIENTS: Contact Interventional Radiology at [phone]) (CHELSI PATIENTS: Contact Interventional Radiology at [phone]) if:    1. Difficulty breathing, nausea or vomiting.    2. Chills or fever above 101 F.    3. Pain at biopsy site not relieved by medication.    4. Develop any redness, swelling, heat, unusual drainage, heavy bruising or bleeding from biopsy site.     Perma-cath Placement   WHAT YOU NEED TO KNOW:   A perma-cath is a catheter placed through a vein into or near your right atrium. Your right atrium is the right upper chamber of your heart. A perma-cath is used for dialysis in an emergency or until a long-term device is ready to use. After your procedure, you will have some pain and swelling on your chest and neck. You may have some bruises on your chest and neck. You may also have 2 dressings, one on your chest and one on your neck.   DISCHARGE INSTRUCTIONS:   Call 911 for any of the following:   You feel lightheaded, short of breath, and have chest pain.    Your catheter comes out   Contact Interventional Radiology at [phone] (ANDRES PATIENTS: Contact Interventional Radiology at [phone]) (CHELSI PATIENTS: Contact Interventional Radiology at [phone]) if:  Blood soaks through your bandage.   You have new swelling in your arm, neck, face, or chest on your right side.  Your catheter gets wet.     Your bruises or pain get worse.   You have a fever or chills.  Persistent nausea or vomiting.   Your incision is red, swollen, or draining pus.   You have questions or concerns about your condition or care.  Self-care:       Resume your normal diet.  Keep your dressings dry. Do not take a shower or swim. You may take a tub bath, but do not get your dressings wet. Water in your wound can cause bacteria to grow and cause an infection. If your dressing gets wet, dry it off and cover it with dry sterile gauze. Call your healthcare provider. Do not use soaps or ointments.  Do not change your dressings. Your healthcare provider or dialysis nurse will change your dressings. Your dressings should stay in place until your healthcare provider removes them. The dressing on your chest will stay as long as you have the catheter in place. The dressing prevents infection.    Do not remove the red and blue caps from the end of your catheter. The caps prevent air from getting into your catheter.  Follow up with your healthcare provider as directed: Write down your questions so you remember to ask them during your visits.

## 2024-06-12 NOTE — ASSESSMENT & PLAN NOTE
Noted to have worsening hypoxia during hospitalization requiring MFNC  Likely multifactorial including volume overload, suspected underlying obstructive sleep apnea and obesity hypoventilation syndrome.    No evidence of pulmonary emboli on diagnostic studies.  CT chest with evidence of bibasilar effusion with compressive atelectasis  Continue supplemental oxygen as needed  Incentive spirometry  Patient states he has an upcoming appointment with a sleep specialist and ultimately will undergo a sleep study as an outpatient.  He has been told he has overnight hypoxia.   Overnight pulse ox study qualifies patient for CPAP.

## 2024-06-12 NOTE — PROGRESS NOTES
NEPHROLOGY PROGRESS NOTE   Joaquin Frankel 44 y.o. male MRN: [id number]  Unit/Bed#: [address] [place] [address] Encounter: [id number]  Reason for Consult: CLOVIS    ASSESSMENT AND PLAN:  43 yo man with PMH of morbid obesity, lower extremity lymphedema, diabetes, hypertension, CAD p/w shortness of breath for the last 2 weeks.  Nephrology is consulted for management of CLOVIS        PLAN:     # Anuric KDIGO CLOVIS stage 3 on CKD G3aA3, HD dependent TTS  Etiology: High suspicious of MGR S with possible multiple myeloma +/- diabetic glomerulopathy however rapid progression it is very uncommon  Baseline creatinine 1.3 to 1.4 mg/dL with previous episodes of CLOVIS  Current creatinine: 7.1, HD dependent  Peak creatinine: 10.42  UA: Hematuria, proteinuria  [id number]  No evidence of kidney recovery  Renal imaging : No hydronephrosis  Kidney biopsy is scheduled for today however patient is morbidly obese, fluid overload,.  Concern of high risk procedure.  IR will discuss bone marrow biopsy with hematology  Treatment:  Off CVVHD   Continue HD TTS   Will continue to monitor signs of kidney recovery  Maintain MAP:  Over 65 mmHg if possible/avoid hypoperfusion:  Hold parameters on blood pressure medications  Avoid nephrotoxic agents such as NSAIDs, and IV contrast if possible. 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  Midodrine during dialysis pretension     #Anemia:  Current hemoglobin: 6.9 mg/dL  Possible secondary to monoclonal gammopathy  No evidence of hemolysis  Treatment:  Recommend optimization of hemoglobin for kidney biopsy     # Monoclonal gammopathy  Immunofixation consistent with IgG Kappa  Multiple myeloma?  Patient seen by Dr. Fernández   Reconsider bone marrow biopsy in the settings of very high risk of kidney biopsy due to morbidly obese, fluid overload, oxygen requirement     #DM  HbA1c 5.7  Advised to maintain a good DM control to prevent progression of CKD   Maintain healthy diet (vegetables, fruits, whole grains, nonfat or low fat)  Weight loss  Physical activity (5 to 10 minutes to start the increase to 30 min a day)        # Sepsis  Possible cellulitis  Antibiotics as per primary team       The highlighted and/or bolded points in my assessment, plan, and disposition were discussed with the primary team and they agree with those points and the plan.  Previous records were personally reviewed by me to obtain a baseline creatinine.   The images (CXR) were personally reviewed by me in PACS      SUBJECTIVE:  Patient seen and examined at bedside. No chest pain, shortness of breath.  Patient had dialysis yesterday complicated with clotting.  Did not receive heparin because of kidney biopsy today    OBJECTIVE:  Current Weight: Weight - Scale: (!) 187 kg (411 lb 4.8 oz)  Vitals:    06/12/24 0729   BP: 131/71   Pulse: 67   Resp: 17   Temp: 99.5 °F (37.5 °C)   SpO2: 98%       Intake/Output Summary (Last 24 hours) at 6/12/2024 0854  Last data filed at 6/11/2024 1956  Gross per 24 hour   Intake 2020 ml   Output 0 ml   Net 2020 ml     Wt Readings from Last 3 Encounters:   06/12/24 (!) 187 kg (411 lb 4.8 oz)   05/30/24 (!) 187 kg (413 lb)   05/22/24 (!) 189 kg (417 lb)     Temp Readings from Last 3 Encounters:   06/12/24 99.5 °F (37.5 °C)   05/30/24 (!) 97 °F (36.1 °C) (Core)    05/16/24 (!) 97 °F (36.1 °C) (Core)     BP Readings from Last 3 Encounters:   06/12/24 131/71   05/22/24 134/74   05/16/24 144/72     Pulse Readings from Last 3 Encounters:   06/12/24 67   05/22/24 76   05/16/24 59        General:  no acute distress at this time, obese  Skin:  No acute rash  Eyes:  No scleral icterus and noninjected  ENT:  mucous membranes moist  Neck:  no carotid bruits  Chest:  Clear to auscultation percussion, good respiratory effort, no use of accessory respiratory muscles  CVS:  Regular rate and rhythm without rub   Abdomen:  soft and nontender   Extremities: Lower extremity edema  Neuro:  No gross focality  Psych:  Alert , cooperative       Medications:    Current Facility-Administered Medications:     acetaminophen (TYLENOL) tablet 650 mg, 650 mg, Oral, Q6H SHAZIA, Holly Sifuentes, TOYANP, 650 mg at 06/12/24 0635    atorvastatin (LIPITOR) tablet 80 mg, 80 mg, Oral, Daily, Holly Sifuentes, CRNP, 80 mg at 06/11/24 1024    Cholecalciferol (VITAMIN D3) tablet 5,000 Units, 5,000 Units, Oral, Daily, Holly Sifuentes, CRNP, 5,000 Units at 06/11/24 1024    cyanocobalamin (VITAMIN B-12) tablet 1,000 mcg, 1,000 mcg, Oral, Daily, Holly Sifuentes, CRNP, 1,000 mcg at 06/11/24 1024    docusate sodium (COLACE) capsule 100 mg, 100 mg, Oral, BID, Holly Sifuentes, CRNP, 100 mg at 06/11/24 1023    gabapentin (NEURONTIN) capsule 300 mg, 300 mg, Oral, BID, Holly Patelker, CRNP, 300 mg at 06/11/24 2116    heparin (porcine) subcutaneous injection 5,000 Units, 5,000 Units, Subcutaneous, Q8H SHAZIA, Holly Sifuentes, CRNP    insulin lispro (HumALOG/ADMELOG) 100 units/mL subcutaneous injection 1-6 Units, 1-6 Units, Subcutaneous, HS, TOYA BriceñoNP, 1 Units at 06/06/24 2150    insulin lispro (HumALOG/ADMELOG) 100 units/mL subcutaneous injection 2-12 Units, 2-12 Units, Subcutaneous, TID AC, 2 Units at 06/08/24 1720 **AND** Fingerstick  Glucose (POCT), , , TID AC, PETRA Briceño    lidocaine (LIDODERM) 5 % patch 1 patch, 1 patch, Topical, Daily, PETRA Briceño    midodrine (PROAMATINE) tablet 5 mg, 5 mg, Oral, Before Dialysis, PETRA Briceño, 5 mg at 06/11/24 1317    nystatin (MYCOSTATIN) powder 1 Application, 1 Application, Topical, BID, PETRA Briceño, 1 Application at 06/11/24 1847    ondansetron (ZOFRAN) injection 4 mg, 4 mg, Intravenous, Q6H PRN, PETRA Briceño, 4 mg at 06/12/24 0840    oxyCODONE (ROXICODONE) split tablet 2.5 mg, 2.5 mg, Oral, Q4H PRN, PETRA Briceño, 2.5 mg at 06/11/24 2116    [COMPLETED] piperacillin-tazobactam (ZOSYN) 4.5 g in sodium chloride 0.9 % 100 mL IV LOADING DOSE, 4.5 g, Intravenous, Once, Stopped at 06/09/24 1900 **FOLLOWED BY** piperacillin-tazobactam (ZOSYN) 4.5 g in sodium chloride 0.9 % 100 mL IVPB (EXTENDED INFUSION), 4.5 g, Intravenous, Q12H, PETRA Briceño, Last Rate: 25 mL/hr at 06/11/24 2312, 4.5 g at 06/11/24 2312    polyethylene glycol (MIRALAX) packet 17 g, 17 g, Oral, Daily PRN, PETRA Briceño    senna (SENOKOT) tablet 8.6 mg, 1 tablet, Oral, HS, PETRA Briceño, 8.6 mg at 06/10/24 2259    sodium hypochlorite (DAKIN'S HALF-STRENGTH) 0.25 percent topical solution 1 Application, 1 Application, Irrigation, Daily, PETRA Briceño, 1 Application at 06/11/24 1027    [START ON 6/13/2024] vancomycin (VANCOCIN) 1,250 mg in sodium chloride 0.9 % 250 mL IVPB, 10 mg/kg (Adjusted), Intravenous, Once per day on Tuesday Thursday Saturday, PETRA Briceño    Laboratory Results:  Results from last 7 days   Lab Units 06/12/24  0631 06/11/24  0607 06/10/24  1249 06/10/24  0607 06/10/24  0024 06/09/24  1954 06/09/24  1538 06/09/24  0450 06/08/24  0558 06/07/24  0846 06/07/24  0846 06/06/24  0312   WBC Thousand/uL 10.22* 11.26*  --  13.22*   --   --   --  13.08* 14.64*  --  14.53* 9.66   HEMOGLOBIN g/dL 7.3* 6.9*  --  6.9*  --   --  7.2* 6.7* 8.0*  --  7.7* 8.0*   HEMATOCRIT % 23.0* 22.0*  --  22.3*  --   --   --  21.2* 26.0*  --  24.5* 25.0*   PLATELETS Thousands/uL 298 317  --  267  --   --   --  281 309  --  316 306   SODIUM mmol/L 128* 133* 133* 135 133* 134* 134* 132* 132*  --  133* 133*   POTASSIUM mmol/L 4.0 4.2 4.7 4.7 4.5 4.5 4.7 4.5 4.9  --  4.5 4.1   CHLORIDE mmol/L 101 98 99 100 99 98 96 97 98  --  100 101   CO2 mmol/L 26 25 25 26 23 25 25 24 18*  --  20* 20*   BUN mg/dL 52* 46* 53* 50* 64* 66* 73* 71* 77*  --  91* 73*   CREATININE mg/dL 7.10* 6.75* 7.62* 6.98* 8.31* 9.12* 10.42* 9.70* 9.55*  --  9.59* 8.10*   CALCIUM mg/dL 7.9* 7.7* 7.8* 7.8* 7.7* 7.1* 7.1* 7.2* 7.2*  --  7.2* 7.6*   MAGNESIUM mg/dL 2.1 2.0 2.0 2.0 1.9 1.8* 1.9 1.6* 1.7*   < >  --   --    PHOSPHORUS mg/dL 5.6* 6.4* 6.9* 6.3* 7.7* 8.5* 10.2*  --   --   --   --   --     < > = values in this interval not displayed.       XR chest portable ICU   Final Result by Daryl Mccarthy MD (06/11 0811)      1.  Right internal jugular dialysis catheter projects over the SVC. No pneumothorax.      2.  Cardiomegaly with vascular congestion.            Workstation performed: [id number]         CT lower extremity wo contrast left   Final Result by Jimbo Camacho MD (06/09 2045)      Extensive subcutaneous edema and skin thickening as described above. This may either reflect bland edema or extensive cellulitis. A well-defined rim-enhancing fluid collection not seen to suggest a discrete abscess. No soft tissue gas.      Osteoarthrosis as above. No osseous erosion.         Workstation performed: [id number]         CT chest abdomen pelvis wo contrast   Final Result by Allen Mcgowan MD (06/10 0804)   1. Small left greater than right basilar effusions with associated compressive atelectasis. Lungs otherwise clear.   2. Periaortic retroperitoneal, left greater than right iliac chain and inguinal  lymph nodes identified, stable and nonspecific.   3. No acute findings.                     Workstation performed: [id number]         XR chest portable   Final Result by Danyell Ford MD (06/09 2240)      Mild pulmonary venous congestion.      Nothing to suggest pneumonia.            Workstation performed: [id number]         IR temporary dialysis catheter placement   Final Result by Radames Liu MD (06/07 1627)   1. Uncomplicated nontunneled/temporary dialysis catheter placement (14 Bangladeshi by 24 cm). Catheter tip terminates in the right atrium. The catheter is ready for immediate use.      2. Impaired hemostasis was discussed with Dr. Timmons of Nephrology. DDAVP will be administered to help mitigate rebleeding risk.      Workstation performed: [id number]         CT chest abdomen pelvis wo contrast   Final Result by Margoth Arango MD (06/03 1555)      No acute pulmonary pathology.      No acute abdominal or pelvic pathology within limitation of a noncontrast study. In particular, no hydronephrosis.      Left inguinal and left external iliac lymphadenopathy, increased from May 2022. This may be reactive to a process in the left lower extremity or in the partially imaged pannus.      Additional findings as above.                  Workstation performed: [id number]         NM lung ventilation / perfusion   Final Result by Daniel Ackerman MD (06/03 1412)      The probability for pulmonary embolus is low.      Workstation performed: [id number]         XR chest 1 view portable   ED Interpretation by Kit Antoine MD (06/03 0753)   Mild pulmonary edema right worse than left, no additional acute cardiopulmonary abnormality      Final Result by Miah Toledo MD (06/03 0841)      No acute cardiopulmonary disease.            Workstation performed: [id number]         IR biopsy kidney random    (Results Pending)    VAS VENOUS DUPLEX - LOWER LIMB BILATERAL    (Results Pending)   IR biopsy bone marrow  "   (Results Pending)       Portions of the record may have been created with voice recognition software. Occasional wrong word or \"sound a like\" substitutions may have occurred due to the inherent limitations of voice recognition software. Read the chart carefully and recognize, using context, where substitutions have occurred.    "

## 2024-06-12 NOTE — PROGRESS NOTES
Cardiology Progress Note  [facility]     Joaquin Frankel 44 y.o. male MRN: [id number]  : 1980  Unit/Bed#: [address] [place] [address] Encounter: [id number]        ASSESSMENT:  Admitted on this occasion for progressive RICHARDSON and exertional chest pain. Presenting EKG showed no acute diagnostic ischemic changes; hs troponin negative x3.   Acute renal failure.  Possibly due to diabetic nephropathy, recent use of NSAIDs, home ACE inhibitor, and cardiorenal syndrome with diastolic dysfunction.  S/p right IJ nontunneled temporary dialysis catheter 2024.  Echo 24: LVEF 58% with grade I LVDD and normal wall motion. RV pressure is moderately elevated at 58 mmHg.   Elevated D dimer.  VQ scan showed low probability of PE.  CT chest and abdomen without contrast 2024 report: Slight mosaic attenuation of the lungs.  No other acute pulmonary pathology.  See full report in Epic.  Monoclonal gammopathy. S/p bone marrow biopsy 24.   SIRS. Unclear source of infection.   Hypertensive urgency on arrival.   CAD. NSTEMI in 2020 s/p PCI to RCA then. Hx of previous PCI to LCX in .   Morbid obesity with chronic lymphedema in the LLE.   Anemia of chronic disease.  Hx of L groin cellulitis and mass with skin grafting.  PMHx includes HLD, DM II, hx of tobacco use (reported stopping 3 years ago).     PLAN AND RECOMMENDATIONS:  Was initially due to undergo renal biopsy this morning but due to morbid obesity, he was felt to be at elevated risk so a bone marrow biopsy was performed instead.   He is currently getting midodrine during dialysis sessions and albumin. Coreg and amlodipine are on hold as a result. Consider resuming over the next day or two if pressures remain stable after dialysis.       Interval history: Breathing is better. Denies chest pain or SOB at rest.         Meds/Allergies   current meds:   Current Facility-Administered Medications   Medication Dose Route Frequency    acetaminophen  "(TYLENOL) tablet 650 mg  650 mg Oral Q6H SHAZIA    atorvastatin (LIPITOR) tablet 80 mg  80 mg Oral Daily    bumetanide (BUMEX) tablet 2 mg  2 mg Oral Daily    Cholecalciferol (VITAMIN D3) tablet 5,000 Units  5,000 Units Oral Daily    cyanocobalamin (VITAMIN B-12) tablet 1,000 mcg  1,000 mcg Oral Daily    docusate sodium (COLACE) capsule 100 mg  100 mg Oral BID    gabapentin (NEURONTIN) capsule 300 mg  300 mg Oral BID    heparin (porcine) subcutaneous injection 5,000 Units  5,000 Units Subcutaneous Q8H SHAZIA    insulin lispro (HumALOG/ADMELOG) 100 units/mL subcutaneous injection 1-6 Units  1-6 Units Subcutaneous HS    insulin lispro (HumALOG/ADMELOG) 100 units/mL subcutaneous injection 2-12 Units  2-12 Units Subcutaneous TID AC    lidocaine (LIDODERM) 5 % patch 1 patch  1 patch Topical Daily    midodrine (PROAMATINE) tablet 5 mg  5 mg Oral Before Dialysis    nystatin (MYCOSTATIN) powder 1 Application  1 Application Topical BID    ondansetron (ZOFRAN) injection 4 mg  4 mg Intravenous Q6H PRN    oxyCODONE (ROXICODONE) split tablet 2.5 mg  2.5 mg Oral Q4H PRN    piperacillin-tazobactam (ZOSYN) 4.5 g in sodium chloride 0.9 % 100 mL IVPB (EXTENDED INFUSION)  4.5 g Intravenous Q12H    polyethylene glycol (MIRALAX) packet 17 g  17 g Oral Daily PRN    senna (SENOKOT) tablet 8.6 mg  1 tablet Oral HS    sodium hypochlorite (DAKIN'S HALF-STRENGTH) 0.25 percent topical solution 1 Application  1 Application Irrigation Daily    [START ON 6/13/2024] vancomycin (VANCOCIN) 1,250 mg in sodium chloride 0.9 % 250 mL IVPB  10 mg/kg (Adjusted) Intravenous Once per day on Tuesday Thursday Saturday     Allergies   Allergen Reactions    Keflex [Cephalexin] Facial Swelling and Lip Swelling    Amoxicillin Hives     childhood       Objective   Vitals: Blood pressure 143/71, pulse 66, temperature 99.5 °F (37.5 °C), resp. rate 18, height 5' 7\" (1.702 m), weight (!) 187 kg (411 lb 4.8 oz), SpO2 100%.,       Intake/Output Summary (Last 24 hours) at " 6/12/2024 1046  Last data filed at 6/11/2024 1956  Gross per 24 hour   Intake 1680 ml   Output 0 ml   Net 1680 ml       Physical Exam:  General: pleasant, comfortable, in no acute distress  Neurologic: speech is coherent, alert and oriented x3  Cardiovascular: regular S1 and S2, no murmurs  Pulmonary: lungs clear to auscultation bilaterally, no rales or wheezes  Abdomen: soft, nontender  Extremities: massive lymphedema in the LLE. RLE shows 1+ pitting edema.       Lab Results:   Troponins:       Recent Results (from the past 24 hour(s))   Fingerstick Glucose (POCT)    Collection Time: 06/11/24 11:46 AM   Result Value Ref Range    POC Glucose 130 65 - 140 mg/dl   Fingerstick Glucose (POCT)    Collection Time: 06/11/24  3:33 PM   Result Value Ref Range    POC Glucose 113 65 - 140 mg/dl   Fingerstick Glucose (POCT)    Collection Time: 06/11/24  9:00 PM   Result Value Ref Range    POC Glucose 117 65 - 140 mg/dl   Prepare Leukoreduced RBC: 1 Units    Collection Time: 06/12/24  6:15 AM   Result Value Ref Range    Unit Product Code R4121C93     Unit Number [id number]     Unit ABO A     Unit RH POS     Crossmatch Compatible     Unit Dispense Status Presumed Trans     Unit Product Volume 350 mL   Cortisol    Collection Time: 06/12/24  6:31 AM   Result Value Ref Range    Cortisol, Random 20.1 ug/dL   Basic metabolic panel    Collection Time: 06/12/24  6:31 AM   Result Value Ref Range    Sodium 128 (L) 135 - 147 mmol/L    Potassium 4.0 3.5 - 5.3 mmol/L    Chloride 101 96 - 108 mmol/L    CO2 26 21 - 32 mmol/L    ANION GAP 1 (L) 4 - 13 mmol/L    BUN 52 (H) 5 - 25 mg/dL    Creatinine 7.10 (H) 0.60 - 1.30 mg/dL    Glucose 100 65 - 140 mg/dL    Calcium 7.9 (L) 8.4 - 10.2 mg/dL    eGFR 8 ml/min/1.73sq m   CBC    Collection Time: 06/12/24  6:31 AM   Result Value Ref Range    WBC 10.22 (H) 4.31 - 10.16 Thousand/uL    RBC 2.58 (L) 3.88 - 5.62 Million/uL    Hemoglobin 7.3 (L) 12.0 - 17.0 g/dL    Hematocrit 23.0 (L) 36.5 - 49.3 %     MCV 89 82 - 98 fL    MCH 28.3 26.8 - 34.3 pg    MCHC 31.7 31.4 - 37.4 g/dL    RDW 14.6 11.6 - 15.1 %    Platelets 298 149 - 390 Thousands/uL    MPV 8.4 (L) 8.9 - 12.7 fL   Protime-INR    Collection Time: 06/12/24  6:31 AM   Result Value Ref Range    Protime 14.8 (H) 11.6 - 14.5 seconds    INR 1.13 0.84 - 1.19   Magnesium    Collection Time: 06/12/24  6:31 AM   Result Value Ref Range    Magnesium 2.1 1.9 - 2.7 mg/dL   Phosphorus    Collection Time: 06/12/24  6:31 AM   Result Value Ref Range    Phosphorus 5.6 (H) 2.7 - 4.5 mg/dL   Type and screen    Collection Time: 06/12/24  6:31 AM   Result Value Ref Range    ABO Grouping A     Rh Factor Positive     Antibody Screen Negative     Specimen Expiration Date 20240615    Fingerstick Glucose (POCT)    Collection Time: 06/12/24  7:56 AM   Result Value Ref Range    POC Glucose 107 65 - 140 mg/dl          Cardiac testing:   Relevant testing reviewed - see above    Jose De Jesus Cardozo PA-C  6/12/2024  10:57 AM     This note was completed in part utilizing direct voice dictation software.  Grammatical errors, random word insertions, spelling mistakes, and incomplete sentences may be an occasional consequence of the system secondary to software limitations, ambient noise and hardware issues. Please read the chart carefully and recognize, using context, where substitutions have occurred.

## 2024-06-12 NOTE — OCCUPATIONAL THERAPY NOTE
"    Occupational Therapy Evaluation     Patient Name: Joaquin Frankel  Today's Date: 6/12/2024  Problem List  Principal Problem:    CLOVIS (acute kidney injury) ([facility])  Active Problems:    Coronary artery disease    Hypertension    Diabetes ([facility])    Hyponatremia    Morbid obesity ([facility])    Chest pain    Anemia of chronic disease    Chronic acquired lymphedema    Acute respiratory failure with hypoxia ([facility])    Wound of abdomen    SIRS (systemic inflammatory response syndrome) ([facility])    Past Medical History  Past Medical History:   Diagnosis Date    Acute hypoxic respiratory failure ([facility]) 10/07/2023    Arthritis     Breathing difficulty     Cellulitis 10/07/2023    Coronary artery disease     Diabetes mellitus ([facility])     Diabetic neuropathy ([facility]) 05/28/2021    Heart disease     CAD   s/p ptca with 1 stent 2012    Hidradenitis suppurativa     groin    History of transfusion     Hyperlipidemia     Hypertension     MI (myocardial infarction) ([facility])     \" silent \" M.I. in the past    Morbid obesity with BMI of 50.0-59.9, adult ([facility])     Nicotine dependence     Obesity     Pilonidal cyst     Type 1 non-ST elevation myocardial infarction (NSTEMI) ([facility]) 11/29/2020     Past Surgical History  Past Surgical History:   Procedure Laterality Date    CARDIAC SURGERY      CORONARY ANGIOPLASTY WITH STENT PLACEMENT      INCISION AND DRAINAGE OF WOUND N/A 1/24/2017    Procedure: INCISION AND DRAINAGE (I&D) BUTTOCK, PILONIDAL CYST;  Surgeon: Simba Adhikari MD;  Location: [facility];  Service:     IR TEMPORARY DIALYSIS CATHETER PLACEMENT  6/6/2024    LEG SURGERY Left     I&D of left leg 2012    DC DEBRIDEMENT SUBCUTANEOUS TISSUE 1ST 20 SQ CM/< Left 7/6/2021    Procedure: DEBRIDEMENT WOUND (WASH OUT);  Surgeon: Sudheer Mcfarlane MD;  Location: [facility];  Service: General    DC EXC B9 LESION MRGN XCP SK TG T/A/L >4.0 CM Left 4/6/2021    Procedure: EXCISION THIGH MASS;  Surgeon: Sudheer Mcfarlane MD;  [facility];  Service: General    DC EXCISION " H/P/P/U COMPLEX REPAIR Left 7/6/2021    Procedure: EXCISION PERINEAL ABSCESS LEFT THIGH;  Surgeon: Sudheer Mcfarlane MD;  [facility];  Service: General    SC NEGATIVE PRESSURE WOUND THERAPY DME <= 50 SQ CM Left 4/6/2021    Procedure: APPLICATION VAC DRESSING THIGH;  Surgeon: Sudheer Mcfarlane MD;  [facility];  Service: General    WOUND DEBRIDEMENT Left 4/17/2021    Procedure: DEBRIDEMENT WOUND AND DRESSING CHANGE (WASH OUT);  Surgeon: Mahin Traore DO;  [facility];  Service: General    WOUND DEBRIDEMENT Left 4/22/2021    Procedure: DEBRIDEMENT LOWER EXTREMITY (WASH OUT), left groin and thigh;  Surgeon: Sudheer Mcfarlane MD;  [facility];  Service: General    WOUND DEBRIDEMENT Left 5/26/2021    Procedure: DEBRIDEMENT LOWER EXTREMITY (WASH OUT);  Surgeon: Zak Castanon DO;  [facility];  Service: General    WOUND DEBRIDEMENT Left 5/28/2021    Procedure: Washout left thigh wound and closure;  Surgeon: Amor Jaramillo DO;  [facility];  Service: General           06/12/24 1519   OT Last Visit   OT Visit Date 06/12/24  (Wednesday)   Note Type   Note type Evaluation  (Eval 2370-5511)   Additional Comments Identified pt by full name and birthdate   Pain Assessment   Pain Assessment Tool 0-10   Pain Score 10 - Worst Possible Pain   Pain Location/Orientation Orientation: Left;Location: Leg  (seated at EOB post eval)   Effect of Pain on Daily Activities limits pace and activity / standing tolerance during ADLs   Patient's Stated Pain Goal No pain   Hospital Pain Intervention(s) Repositioned;Ambulation/increased activity;Emotional support  (RN, Anna made aware post eval)   Restrictions/Precautions   Weight Bearing Precautions Per Order No   Other Precautions Fall Risk;Pain;Multiple lines;Fluid restriction   Home Living   Type of Home Apartment   Home Layout One level;Performs ADLs on one level;Able to live on main level with bedroom/bathroom;Stairs to enter with rails  (2  "flight of stairs)   Bathroom Shower/Tub Tub/shower unit   Bathroom Toilet Standard   Bathroom Equipment   (no DME)   Bathroom Accessibility Accessible   Home Equipment   (no AD or DME)   Additional Comments Pt reports living w/ his 20 yo son in apt   Prior Function   Level of [place] Independent with ADLs;Independent with functional mobility;Independent with IADLS   Lives With Son   Receives Help From Family   IADLs Independent with driving;Independent with meal prep;Independent with medication management   Falls in the last 6 months 0   Vocational Full time employment   Comments Pt reports I w/ ADLs/ IADLs w/ out use of AD or DME   Lifestyle   Autonomy Pt reports I w/ ADLs at baseline w/ out use of AD   Reciprocal Relationships Pt reports living w/ his 20 yo son   Service to Others Pt reports working full time as    Intrinsic Gratification Pt reprots enjoying playing video games   General   Additional Pertinent History Pt admitted to [facility] on 6/3/24 w/ chest pain and shortness of breath. Transfer to ICU on 6/9/24 and to med surg on 6/11/24.   Family/Caregiver Present No   Additional General Comments Personal and environmental factors supporting performance includes first floor environment, independent at baseline; barriers include increased pain, inability to complete IADLs, difficulty managing stairs   Subjective   Subjective I am having a hard time because I am so swollen\"   ADL   Where Assessed Edge of bed   Eating Assistance 7  Independent   Grooming Assistance 6  Modified Independent   Grooming Deficit Setup;Increased time to complete  (seated unsupported at EOB due to decreased standing tolerance / balance)   UB Bathing Assistance Unable to assess   LB Bathing Assistance Unable to assess   UB Dressing Assistance 5  Supervision/Setup  (seated unsupported at EOB)   UB Dressing Deficit Setup;Supervision/safety;Increased time to complete   LB Dressing Assistance 2  Maximal Assistance   LB Dressing " Deficit Setup;Steadying;Supervision/safety;Increased time to complete;Don/doff L sock;Don/doff R sock   Toileting Assistance  Unable to assess  (returned from bathroom prior to therapist arrival. Anticipate min A w/ + time)   Additional Comments on 2L acute O2   Bed Mobility   Supine to Sit Unable to assess   Sit to Supine Unable to assess   Additional Comments Pt seated at EOB upon arrival and post eval w/ needs met, call bell in reach   Transfers   Sit to Stand 4  Minimal assistance   Additional items Assist x 1;Increased time required;Verbal cues;Bedrails;Armrests  (instruction for hand placement)   Stand to Sit 5  Supervision   Additional items Assist x 1;Increased time required;Verbal cues;Bedrails;Armrests   Additional Comments Pt performed sit <> stand 2X   Functional Mobility   Functional Mobility 4  Minimal assistance   Additional Comments engaged in functional mobility approx 20 feet using RW w/ min A and + time. Wide gait due to LE / groin edema   Additional items Rolling walker   Balance   Static Sitting Good   Dynamic Sitting Fair   Static Standing Fair   Ambulatory Fair -   Activity Tolerance   Activity Tolerance Patient limited by pain;Patient limited by fatigue   Medical Staff Made Aware care coordination w/ Liliya TALAMANTES due to decreased activity tolerance, regression from baseline and skilled physical assistance required   Nurse Made Aware spoke w/ Anna QUINN Assessment   RUE Assessment WFL   RUE Strength   RUE Overall Strength Within Functional Limits - able to perform ADL tasks with strength   LUE Assessment   LUE Assessment WFL   LUE Strength   LUE Overall Strength Within Functional Limits - able to perform ADL tasks with strength   Hand Function   Gross Motor Coordination Functional   Fine Motor Coordination Functional   Cognition   Overall Cognitive Status WFL   Arousal/Participation Alert;Cooperative   Attention Within functional limits   Orientation Level Oriented X4   Memory Within  functional limits   Following Commands Follows all commands and directions without difficulty   Comments Alert, oriented and able to follow directions during ADLs   Assessment   Limitation Decreased ADL status;Decreased endurance;Decreased self-care trans;Decreased high-level ADLs  (impaired activity tolerance, pain, forward functional reach, standing tolerance, balance, LE strength, LE edema)   Assessment Pt is a 45yo male admitted to [facility]\"" on 6/3/24 w/ chest pain and shortness of breath. Diagnosed w/ CLOVIS. Pt is s/p IR bone marrow biopsy on 6/12/24 and s/p temporary HD catheter placement on 6/6/24. Pt reports I w/ ADLs / IADLs at baseline at home w/ 22yo son in apt w/ + ADRIANA. Significant PMH Impacting his occupational performance includes obesity, arthritis, CAD, DM, diabetic neuropathy, HTn, hx MI (2020), multiple L LE wound debridement. Upon eval, pt alert and oriented. Able to follow directions and communicate wants / needs on acute O2. Pt required mod I grooming seated, S UBD seated, max A LBD, min A sit to stand, and min A using RW functional mobility < than household distances. Pt returned to EOB due to weakness / dizziness. /78. Pt is completing ADLs below baseline level of I and would benefit from OT in acute care to max I w/ ADLs and improve engagement. Recommend level II rehab resource intensity when medically stable for discharge from acute care pend ability to manage stairs, improved activity tolerance, and medical optimization. Will continue to follow   Goals   Patient Goals Pt stated that he would like to have less pain and return home to baseline level of I   Plan   Treatment Interventions ADL retraining;Endurance training;Equipment evaluation/education;Patient/family training;Functional transfer training;Compensatory technique education;Continued evaluation;Energy conservation;Activityengagement   Goal Expiration Date 06/26/24   OT Treatment Day 0  (Wed 6/12/24)   OT Frequency 3-5x/wk   Discharge  Recommendation   Rehab Resource Intensity Level, OT III (Minimum Resource Intensity)   AM-PAC Daily Activity Inpatient   Lower Body Dressing 2   Bathing 2   Toileting 3   Upper Body Dressing 4   Grooming 4   Eating 4   Daily Activity Raw Score 19   Daily Activity Standardized Score (Calc for Raw Score >=11) 40.22   AM-PAC Applied Cognition Inpatient   Following a Speech/Presentation 4   Understanding Ordinary Conversation 4   Taking Medications 3   Remembering Where Things Are Placed or Put Away 4   Remembering List of 4-5 Errands 4   Taking Care of Complicated Tasks 4   Applied Cognition Raw Score 23   Applied Cognition Standardized Score 53.08   Barthel Index   Feeding 10   Bathing 0   Grooming Score 5   Dressing Score 5   Bladder Score 10   Bowels Score 10   Toilet Use Score 5   Transfers (Bed/Chair) Score 10   Mobility (Level Surface) Score 0   Stairs Score 0   Barthel Index Score 55   End of Consult   Education Provided Yes   Patient Position at End of Consult Seated edge of bed;All needs within reach   Nurse Communication Nurse aware of consult   Licensure   [place] License Number  Katie Pathak, OTR/L [id number]        The patient's raw score on the AM-PAC Daily Activity Inpatient Short Form is 19. A raw score of greater than or equal to 19 suggests the patient may benefit from discharge to home. Please refer to the recommendation of the Occupational Therapist for safe discharge planning.      Pt goals to be met by 6/26/24 to max I w/ ADLs and improve engagement to return home to baseline level of I  includes:    -Pt will demonstrate good attention and understanding EC tech to max I w/ ADLs and improve engagement    -Pt will complete LBD w/ mod I using LHAE as needed to minimize burden of care    -Pt will demonstrate improved AMPAC score to at least 23 to max I w/ ADLs and assist w/ DC Planning    -Pt will complete functional transfers to all surfaces w/ mod I using LRAD, DME as needed    -Pt will complete  UBD independently    -Pt will consistently engage in functional mobility using LRAD, DME as needed w/ mod I household distances.     -Pt will demonstrate improved activity and sitting tolerance OOB In chair for all meals    Katie Pathak, OTR/L  [id number]  [id number]

## 2024-06-12 NOTE — SEDATION DOCUMENTATION
Procedure ended. IR bone marrow biopsy completed by Dr. Rodarte. Bandaid to site. Patient returned to inpatient room

## 2024-06-12 NOTE — ASSESSMENT & PLAN NOTE
Patient with a history of coronary artery disease with multivessel disease status post stents.    Holding aspirin  Continue statin  Cardiology following and appreciate their input.

## 2024-06-12 NOTE — BRIEF OP NOTE (RAD/CATH)
INTERVENTIONAL RADIOLOGY PROCEDURE NOTE    Date: 6/12/2024    Procedure:   Procedure Summary       Date:  Room / Location:     Anesthesia Start:  Anesthesia Stop:     Procedure:  Diagnosis:     Scheduled Providers:  Responsible Provider:     Anesthesia Type: Not recorded ASA Status: Not recorded            Preoperative diagnosis:   1. Dyspnea on exertion    2. Chest pain    3. Hypoxia    4. Renal failure    5. CLOVIS (acute kidney injury) ([facility])    6. Wound drainage    7. Anemia of chronic disease    8. IgG gammopathy    9. Cellulitis of left lower extremity    10. CKD (chronic kidney disease) stage 4, GFR 15-29 ml/min ([facility])         Postoperative diagnosis: Same.    Surgeon: Geovani Rodarte MD     Assistant: None. No qualified resident was available.    Blood loss: Minimal    Specimens: 6 cc bone marrow, 1 bone core     Findings: BM biopsy     Complications: None immediate.    Anesthesia: local

## 2024-06-12 NOTE — PLAN OF CARE
Problem: PHYSICAL THERAPY ADULT  Goal: Performs mobility at highest level of function for planned discharge setting.  See evaluation for individualized goals.  Description: Treatment/Interventions: ADL retraining, Functional transfer training, LE strengthening/ROM, Therapeutic exercise, Endurance training, Bed mobility, Gait training, Spoke to nursing          See flowsheet documentation for full assessment, interventions and recommendations.  Note: Prognosis: Good  Problem List: Decreased strength, Decreased range of motion, Decreased endurance, Impaired balance, Decreased mobility, Pain, Decreased skin integrity  Assessment: Patient seen for Physical Therapy evaluation. Patient admitted with CLOVIS (acute kidney injury) ([facility]).  Comorbidities affecting patient's physical performance include: anemia, CAD, cardiac disease, diabetes, and hyperlipidemia, lymphedema.  Personal factors affecting patient at time of initial evaluation include: lives in 1 story house, ambulating with assistive device, stairs to enter home, inability to navigate community distances, inability to navigate level surfaces without external assistance, inability to perform IADLS , and inability to live alone. Prior to admission, patient was independent with functional mobility without assistive device, independent with ADLS, independent with IADLS, living with son in a 1 level home with 20+ steps to enter, ambulating household distance, ambulating community distances, and works full time.  Please find objective findings from Physical Therapy assessment regarding body systems outlined above with impairments and limitations including weakness, decreased ROM, impaired balance, decreased endurance, gait deviations, pain, decreased activity tolerance, decreased functional mobility tolerance, decreased safety awareness, fall risk, and decreased skin integrity.  The Barthel Index was used as a functional outcome tool presenting with a score of Barthel  Index Score: 65 today indicating moderate limitations of functional mobility and ADLS.  Patient's clinical presentation is currently unstable/unpredictable as seen in patient's presentation of changing level of pain, increased fall risk, new onset of impairment of functional mobility, decreased endurance, and new onset of weakness. Pt would benefit from continued Physical Therapy treatment to address deficits as defined above and maximize level of functional mobility. As demonstrated by objective findings, the assigned level of complexity for this evaluation is high.The patient's AM-[facility] Basic Mobility Inpatient Short Form Raw Score is 17. A Raw score of greater than 16 suggests the patient may benefit from discharge to home pending clinical course + progression to negotiate steps. Please also refer to the recommendation of the Physical Therapist for safe discharge planning.        Rehab Resource Intensity Level, PT: III (Minimum Resource Intensity) (TBD pending clinical course + progression to stairs)    See flowsheet documentation for full assessment.

## 2024-06-12 NOTE — PROGRESS NOTES
Formerly [facility]  Progress Note  Name: Joaquin Frankel I  MRN: [id number]  Unit/Bed#: 4 [place] 412-01 I Date of Admission: 6/3/2024   Date of Service: 6/12/2024 I Hospital Day: 9    Assessment & Plan   SIRS (systemic inflammatory response syndrome) ([facility])  Assessment & Plan  Suspect secondary to left lower extremity cellulitis, slowly improving  CT left lower extremity-extensive subcutaneous edema and skin thickening  CT chest abdomen pelvis-without any acute abnormality  Blood cultures negative, MRSA surveillance negative  Currently on Vanco plus Zosyn  Will de-escalate to cefazolin  Monitor clinically  Follow-up venous Doppler    Acute respiratory failure with hypoxia (HCC)  Assessment & Plan  Noted to have worsening hypoxia during hospitalization requiring MFNC  Likely multifactorial including volume overload, suspected underlying obstructive sleep apnea and obesity hypoventilation syndrome.    No evidence of pulmonary emboli on diagnostic studies.  CT chest with evidence of bibasilar effusion with compressive atelectasis  Continue supplemental oxygen as needed  Incentive spirometry  Patient states he has an upcoming appointment with a sleep specialist and ultimately will undergo a sleep study as an outpatient.  He has been told he has overnight hypoxia.   Overnight pulse ox study qualifies patient for CPAP.      * CLOVIS (acute kidney injury) ([facility])  Assessment & Plan  POA with creatinine of 6.40.    Baseline creatinine 1.3-1.4  Noted to have worsening creatinine: Peaked at 10.42.  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  UA CR - 5280  No evidence of hydronephrosis or obstruction on CT imaging.  Serum and urine immunofixation showing IgG kappa monoclonal band  Initially on CVV HD, now HD TTS  Continue HD and nephrology  Follow-up labs  Initially planned for renal  biopsy but deferred due to high risk.  Status post bone marrow biopsy 6/12  Follow-up workup as per nephrology and hematology  Will require permacath placement and outpatient dialysis arrangement    Wound of abdomen  Assessment & Plan  Noted to have left lower quadrant abdominal wound with tracking and purulent drainage.  Seen by wound care, recommended general surgery consultation  Appreciate surgery input  No acute surgical intervention at this time  Continue local wound care per wound care nurse  Follow-up in the outpatient wound center    Anemia of chronic disease  Assessment & Plan  Patient has anemia of chronic disease likely secondary to underlying nephropathy,?  Myeloma.      He did have some episodes of hematuria.  No further overt bleeding  Hemoglobin gradually down trended to 6.7 g/dL, status post 1 PRBC on 6/11  B12 310, add supplementation  Iron panel shows iron deficiency.  Status post IV venofer 200mg every other day x 2  Continue to monitor hemoglobin closely and transfuse for hemoglobin less than 7.0 or drastic drop in hemoglobin.    Chest pain  Assessment & Plan  POA.  Denies chest discomfort at present  VQ scan low suspicion for PE  Echo with normal EF and grade 1 diastolic dysfunction  Cardiology input appreciated  ASA on hold for biopsy     Hyponatremia  Assessment & Plan  Monitor    Diabetes (HCC)  Assessment & Plan  Lab Results   Component Value Date    HGBA1C 5.7 (H) 06/09/2024     Recent Labs     06/12/24  0756 06/12/24  1131 06/12/24  1557 06/12/24 2051   POCGLU 107 98 127 123     Blood Sugar Average: Last 72 hrs:  (P) 102.5    Amaryl held during his hospitalization.   Acceptable, continue to monitor on current regimen  Continue diabetic diet.      Hypertension  Assessment & Plan  Holding amlodipine and carvedilol for low blood pressure  Requiring midodrine predialysis  Blood pressure stable at present  Started on Bumex 2 mg daily  Monitor intake output, daily weight  Monitor blood  pressure    Coronary artery disease  Assessment & Plan  Patient with a history of coronary artery disease with multivessel disease status post stents.    Holding aspirin  Continue statin  Cardiology following and appreciate their input.    Chronic acquired lymphedema  Assessment & Plan  Patient developed LLE lymphedema after left lower extremity surgical procedure several years ago.  Patient complaining of increasing pain in the left lower extremity likely secondary to volume overload.  Left lower extremity venous Doppler: Pending  Patient would benefit from ambulatory referral to lymphedema clinic.    Morbid obesity (HCC)  Assessment & Plan  With BMI of 64.42 which is good  Encourage lifestyle modification on discharge               VTE Pharmacologic Prophylaxis: VTE Score: 7 Moderate Risk (Score 3-4) - Pharmacological DVT Prophylaxis Ordered: heparin.    Mobility:   Basic Mobility Inpatient Raw Score: 7  JH-HLM Goal: 2: Bed activities/Dependent transfer  JH-HLM Achieved: 2: Bed activities/Dependent transfer  JH-HLM Goal NOT achieved. Continue with multidisciplinary rounding and encourage appropriate mobility to improve upon JH-HLM goals.    Patient Centered Rounds: I performed bedside rounds with nursing staff today.   Discussions with Specialists or Other Care Team Provider: Nephrology    Education and Discussions with Family / Patient:  Discussed with the patient.     Total Time Spent on Date of Encounter in care of patient: 45 mins. This time was spent on one or more of the following: performing physical exam; counseling and coordination of care; obtaining or reviewing history; documenting in the medical record; reviewing/ordering tests, medications or procedures; communicating with other healthcare professionals and discussing with patient's family/caregivers.    Current Length of Stay: 9 day(s)  Current Patient Status: Inpatient   Certification Statement: The patient will continue to require additional  inpatient hospital stay due to acute kidney injury  Discharge Plan: Anticipate discharge in >72 hrs to discharge location to be determined pending rehab evaluations.    Code Status: Level 1 - Full Code    Subjective:   Underwent bone marrow biopsy today  Reported episode of greenish-yellow emesis after taking multiple pills postprocedure  Denies any chest pain shortness of breath abdominal pain  Does not feel nauseous anymore    Left lower extremity swelling stable, pain is improved      Objective:     Vitals:   Temp (24hrs), Av.4 °F (36.9 °C), Min:97 °F (36.1 °C), Max:99.5 °F (37.5 °C)    Temp:  [97 °F (36.1 °C)-99.5 °F (37.5 °C)] 99.5 °F (37.5 °C)  HR:  [62-84] 67  Resp:  [17-32] 17  BP: ()/(50-83) 131/71  SpO2:  [94 %-98 %] 98 %  Body mass index is 64.42 kg/m².     Input and Output Summary (last 24 hours):     Intake/Output Summary (Last 24 hours) at 2024 0842  Last data filed at 2024 1956  Gross per 24 hour   Intake 2020 ml   Output 0 ml   Net 2020 ml       Physical Exam:   Physical Exam  Vitals and nursing note reviewed.   Constitutional:       General: He is not in acute distress.     Appearance: He is well-developed. He is obese.   HENT:      Head: Normocephalic and atraumatic.   Cardiovascular:      Rate and Rhythm: Normal rate.      Heart sounds: No murmur heard.  Pulmonary:      Effort: Pulmonary effort is normal. No respiratory distress.      Breath sounds: Decreased breath sounds present.   Abdominal:      Palpations: Abdomen is soft.      Tenderness: There is no abdominal tenderness.   Musculoskeletal:         General: No swelling.      Right lower leg: Edema present.      Left lower leg: Edema present.      Comments: Left lower extremity edema improving, superimposed on left lower extremity lymphedema   Skin:     General: Skin is warm and dry.      Capillary Refill: Capillary refill takes less than 2 seconds.   Neurological:      Mental Status: He is alert.   Psychiatric:          Mood and Affect: Mood normal.            Additional Data:     Labs:  Results from last 7 days   Lab Units 24  0631 24  0607   WBC Thousand/uL 10.22* 11.26*   HEMOGLOBIN g/dL 7.3* 6.9*   HEMATOCRIT % 23.0* 22.0*   PLATELETS Thousands/uL 298 317   SEGS PCT %  --  82*   LYMPHO PCT %  --  6*   MONO PCT %  --  8   EOS PCT %  --  2     Results from last 7 days   Lab Units 24  0631   SODIUM mmol/L 128*   POTASSIUM mmol/L 4.0   CHLORIDE mmol/L 101   CO2 mmol/L 26   BUN mg/dL 52*   CREATININE mg/dL 7.10*   ANION GAP mmol/L 1*   CALCIUM mg/dL 7.9*   GLUCOSE RANDOM mg/dL 100     Results from last 7 days   Lab Units 24  0631   INR  1.13     Results from last 7 days   Lab Units 24  0756 24  2100 24  1533 24  1146 24  0747 24  0617 06/10/24  2014 06/10/24  1526 06/10/24  1101 06/10/24  0717 24  2020 24  1603   POC GLUCOSE mg/dl 107 117 113 130 88 72 83 80 98 93 119 99     Results from last 7 days   Lab Units 24  1538   HEMOGLOBIN A1C % 5.7*     Results from last 7 days   Lab Units 24  1538 24  0450 24  0558 24  1215   LACTIC ACID mmol/L 0.4*  --   --  0.3*   PROCALCITONIN ng/ml  --  10.26* 7.47* 2.85*       Lines/Drains:  Invasive Devices       Peripheral Intravenous Line  Duration             Peripheral IV 24 Left;Upper Arm 2 days    Peripheral IV 24 Right;Upper Arm 2 days              Hemodialysis Catheter  Duration             HD Temporary Double Catheter 1 day                      Telemetry:  Telemetry Orders (From admission, onward)               Temporary telemetry monitoring  (Hemodialysis Initiation Treatment)  Continuous x 24 Hours (Telem)           Question:  Indication  Answer:  During dialysis for arrythmia monitoring                     Telemetry Reviewed: Normal Sinus Rhythm  Indication for Continued Telemetry Use: No indication for continued use. Will discontinue.              Imaging: Reviewed  radiology reports from this admission including: chest xray, chest CT scan, and abdominal/pelvic CT    Recent Cultures (last 7 days):   Results from last 7 days   Lab Units 06/07/24  1230 06/07/24  1200   BLOOD CULTURE  No Growth After 4 Days. No Growth After 4 Days.       Last 24 Hours Medication List:   Current Facility-Administered Medications   Medication Dose Route Frequency Provider Last Rate    acetaminophen  650 mg Oral Q6H [facility] PETRA Shell      atorvastatin  80 mg Oral Daily [facility] PETRA Sifuentes      Cholecalciferol  5,000 Units Oral Daily [facility] Bear, PETRA      cyanocobalamin  1,000 mcg Oral Daily [facility] Bear, PETRA      docusate sodium  100 mg Oral BID [facility] Bear, PETRA      gabapentin  300 mg Oral BID [facility] PETRA Sifuentes      heparin (porcine)  5,000 Units Subcutaneous Q8H [facility] PETRA Sifuentes      insulin lispro  1-6 Units Subcutaneous HS [facility] PETRA Sifuentes      insulin lispro  2-12 Units Subcutaneous TID AC [place] PETRA Shell      lidocaine  1 patch Topical Daily [facility] PETRA Sifuentes      midodrine  5 mg Oral Before Dialysis [place] PETRA Shell      nystatin  1 Application Topical BID [facility] PETRA Sifuentes      ondansetron  4 mg Intravenous Q6H PRN [place] PETRA Shell      oxyCODONE  2.5 mg Oral Q4H PRN [facility] PETRA Sifuentes      piperacillin-tazobactam  4.5 g Intravenous Q12H [place] PETRA Shell 4.5 g (06/11/24 2312)    polyethylene glycol  17 g Oral Daily PRN [facility] PETRA Sifuentes      senna  1 tablet Oral HS [facility] PETAR Sifuentes      sodium hypochlorite  1 Application Irrigation Daily HollyPETRA Avalos      [START ON 6/13/2024] vancomycin  10 mg/kg (Adjusted) Intravenous Once per day on Tuesday Thursday Saturday PETRA Briceño          Today, Patient Was Seen  By: Dennis Waterman MD    **Please Note: This note may have been constructed using a voice recognition system.**

## 2024-06-12 NOTE — ASSESSMENT & PLAN NOTE
Holding amlodipine and carvedilol for low blood pressure  Requiring midodrine predialysis  Blood pressure stable at present  Started on Bumex 2 mg daily  Monitor intake output, daily weight  Monitor blood pressure

## 2024-06-12 NOTE — ASSESSMENT & PLAN NOTE
POA with creatinine of 6.40.    Baseline creatinine 1.3-1.4  Noted to have worsening creatinine: Peaked at 10.42.  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  UA CR - 5280  No evidence of hydronephrosis or obstruction on CT imaging.  Serum and urine immunofixation showing IgG kappa monoclonal band  Initially on CVV HD, now HD TTS  Continue HD and nephrology  Follow-up labs  Initially planned for renal biopsy but deferred due to high risk.  Status post bone marrow biopsy 6/12  Follow-up workup as per nephrology and hematology  Will require permacath placement and outpatient dialysis arrangement

## 2024-06-12 NOTE — PHYSICAL THERAPY NOTE
"   PT EVALUATION       06/12/24 1514   PT Last Visit   PT Visit Date 06/12/24   Note Type   Note type Evaluation   Pain Assessment   Pain Assessment Tool 0-10   Pain Score 7   Pain Location/Orientation Orientation: Lower;Location: Back;Orientation: Left;Location: Leg   Pain Onset/Description Onset: Ongoing;Descriptor: Sore   Effect of Pain on Daily Activities limits mobility   Patient's Stated Pain Goal No pain   Hospital Pain Intervention(s) Repositioned;Ambulation/increased activity   Multiple Pain Sites No   Restrictions/Precautions   Weight Bearing Precautions Per Order No   Other Precautions Fall Risk;Pain;O2   Home Living   Type of Home Apartment   Home Layout One level;Performs ADLs on one level;Able to live on main level with bedroom/bathroom;Stairs to enter with rails  (2 flights of stairs to enter apartment)   Bathroom Toilet Standard   Additional Comments no DME, IND PTA   Prior Function   Level of [place] Independent with ADLs;Independent with functional mobility;Independent with IADLS   Lives With Son   Receives Help From Family   IADLs Independent with driving;Independent with medication management;Independent with meal prep   Falls in the last 6 months 0   Vocational Full time employment  (reports working as a )   General   Additional Pertinent History Pt is a 44 year-old male who was admitted to the hospital on 6/3/24 due to chest pain, SOB. New dialysis patient   Family/Caregiver Present No   Cognition   Overall Cognitive Status WFL   Arousal/Participation Cooperative   Orientation Level Oriented X4   Following Commands Follows all commands and directions without difficulty   Subjective   Subjective \"I just got back from the bathroom but I need some help\"   RLE Assessment   RLE Assessment WFL   LLE Assessment   LLE Assessment   (Hip at least 3/5 ; Knee/ankle 4+/5 ; aiken by pain + presents with increased swelling LLE)   Bed Mobility   Supine to Sit Unable to assess   Sit to Supine Unable to " assess   Additional Comments Received ambulating at EOB, returing to sit   Transfers   Sit to Stand 4  Minimal assistance  (initially supervision, progressing to Min A due to LLE pain)   Additional items Assist x 1;Verbal cues   Stand to Sit 5  Supervision   Additional items Assist x 1;Verbal cues   Ambulation/Elevation   Gait pattern Wide DEJON;Short stride;Step to;Foward flexed  (decreased gait speed ; wide DEJON dur to LLE swelling, limited by xavier)   Gait Assistance 4  Minimal assist   Additional items Assist x 1;Verbal cues   Assistive Device Bariatric Rolling walker   Distance 20 feet with change of direction   Stair Management Assistance Not tested   Balance   Static Sitting Good   Dynamic Sitting Fair +   Static Standing Fair   Dynamic Standing Fair   Ambulatory Fair -  (RW)   Activity Tolerance   Activity Tolerance Patient limited by pain;Patient limited by fatigue   Medical Staff Made Aware care coordinated with OT Katie due to decreased activity tolerance, regression from baseline   Nurse Made Aware yes RN Anna   Assessment   Prognosis Good   Problem List Decreased strength;Decreased range of motion;Decreased endurance;Impaired balance;Decreased mobility;Pain;Decreased skin integrity   Assessment Patient seen for Physical Therapy evaluation. Patient admitted with CLOVIS (acute kidney injury) (HCC).  Comorbidities affecting patient's physical performance include: anemia, CAD, cardiac disease, diabetes, and hyperlipidemia, lymphedema.  Personal factors affecting patient at time of initial evaluation include: lives in 1 story house, ambulating with assistive device, stairs to enter home, inability to navigate community distances, inability to navigate level surfaces without external assistance, inability to perform IADLS , and inability to live alone. Prior to admission, patient was independent with functional mobility without assistive device, independent with ADLS, independent with IADLS, living with son in a 1  level home with 20+ steps to enter, ambulating household distance, ambulating community distances, and works full time.  Please find objective findings from Physical Therapy assessment regarding body systems outlined above with impairments and limitations including weakness, decreased ROM, impaired balance, decreased endurance, gait deviations, pain, decreased activity tolerance, decreased functional mobility tolerance, decreased safety awareness, fall risk, and decreased skin integrity.  The Barthel Index was used as a functional outcome tool presenting with a score of Barthel Index Score: 65 today indicating moderate limitations of functional mobility and ADLS.  Patient's clinical presentation is currently unstable/unpredictable as seen in patient's presentation of changing level of pain, increased fall risk, new onset of impairment of functional mobility, decreased endurance, and new onset of weakness. Pt would benefit from continued Physical Therapy treatment to address deficits as defined above and maximize level of functional mobility. As demonstrated by objective findings, the assigned level of complexity for this evaluation is high.The patient's AM-[facility] Basic Mobility Inpatient Short Form Raw Score is 17. A Raw score of greater than 16 suggests the patient may benefit from discharge to home pending clinical course + progression to negotiate steps. Please also refer to the recommendation of the Physical Therapist for safe discharge planning.   Goals   Patient Goals to decrease pain and return home   STG Expiration Date 06/19/24   Short Term Goal #1 Pt will perform bed mobility IND ; Pt will perform bed <> chair transfer IND ; Standing balance will improve to fair+ to decrease risk of falls ; BLE strength will improve by 1/2 grade to improve tolerance to functional mobility ; Pt will ambulate x 150 feet with supervision + least restrictive device ; Pt will negotiate x 20+ steps with supervision + rail to  enter/exit home ; AMPAC score will improve >19/24 to demonstrate improved functional independence   LTG Expiration Date 06/26/24   Long Term Goal #1 Standing balance will improve to good to decrease risk of falls ; BLE strength will improve by 1 grade to improve tolerance to functional mobility ; Pt will ambulate x 250 feet Mod I + least restrictive device ; Pt will negotiate x 20+ steps Mod I + rail to enter/exit home ; AMPAC score will improve >21/24 to demonstrate improved functional independence   Plan   Treatment/Interventions ADL retraining;Functional transfer training;LE strengthening/ROM;Therapeutic exercise;Endurance training;Bed mobility;Gait training;Spoke to nursing   PT Frequency Other (Comment)  (5x/week)   Discharge Recommendation   Rehab Resource Intensity Level, PT III (Minimum Resource Intensity)  (TBD pending clinical course + progression to stairs)   AM-PAC Basic Mobility Inpatient   Turning in Flat Bed Without Bedrails 3   Lying on Back to Sitting on Edge of Flat Bed Without Bedrails 3   Moving Bed to Chair 3   Standing Up From Chair Using Arms 3   Walk in Room 3   Climb 3-5 Stairs With Railing 2   Basic Mobility Inpatient Raw Score 17   Basic Mobility Standardized Score 39.67   [facility] Highest Level Of Mobility   -HLM Goal 5: Stand one or more mins   -HLM Achieved 6: Walk 10 steps or more   Barthel Index   Feeding 10   Bathing 0   Grooming Score 5   Dressing Score 5   Bladder Score 10   Bowels Score 10   Toilet Use Score 5   Transfers (Bed/Chair) Score 10   Mobility (Level Surface) Score 10   Stairs Score 0   Barthel Index Score 65   End of Consult   Patient Position at End of Consult Seated edge of bed;All needs within reach   Licensure   [place] License Number  Liliya Dawson [id number]

## 2024-06-12 NOTE — ASSESSMENT & PLAN NOTE
Noted to have left lower quadrant abdominal wound with tracking and purulent drainage.  Seen by wound care, recommended general surgery consultation  Appreciate surgery input  No acute surgical intervention at this time  Continue local wound care per wound care nurse  Follow-up in the outpatient wound center

## 2024-06-12 NOTE — ASSESSMENT & PLAN NOTE
Patient has anemia of chronic disease likely secondary to underlying nephropathy,?  Myeloma.      He did have some episodes of hematuria.  No further overt bleeding  Hemoglobin gradually down trended to 6.7 g/dL, status post 1 PRBC on 6/11  B12 310, add supplementation  Iron panel shows iron deficiency.  Status post IV venofer 200mg every other day x 2  Continue to monitor hemoglobin closely and transfuse for hemoglobin less than 7.0 or drastic drop in hemoglobin.

## 2024-06-12 NOTE — PROGRESS NOTES
Joaquin Frankel is a 44 y.o. male who is currently ordered Vancomycin IV with management by the Pharmacy Consult service.  Relevant clinical data and objective / subjective history reviewed.  Vancomycin Assessment:  Indication and Goal AUC/Trough: Soft tissue (goal -600, trough >10), -- (Target pre-HD level 10-15)  Clinical Status: worsening  Micro:     Renal Function:  SCr: 7.10 mg/dL  CrCl: 21.6 mL/min  Renal replacement: HD  Days of Therapy: 3  Current Dose: 1250 mg IV post HD on [id number]  Vancomycin Plan:  New Dosin mg IV post HD on [id number]  Next Level: 24 at 0600  Renal Function Monitoring: Daily BMP and UOP  Pharmacy will continue to follow closely for s/sx of nephrotoxicity, infusion reactions and appropriateness of therapy.  BMP and CBC will be ordered per protocol. We will continue to follow the patient’s culture results and clinical progress daily.    Aide Velazco, Pharmacist

## 2024-06-12 NOTE — ASSESSMENT & PLAN NOTE
POA.  Denies chest discomfort at present  VQ scan low suspicion for PE  Echo with normal EF and grade 1 diastolic dysfunction  Cardiology input appreciated  ASA on hold for biopsy

## 2024-06-12 NOTE — OCCUPATIONAL THERAPY NOTE
"    Occupational Therapy Cancellation     Patient Name: Joaquin Frankel  Today's Date: 6/12/2024  Problem List  Principal Problem:    CLOVIS (acute kidney injury) ([facility])  Active Problems:    Coronary artery disease    Hypertension    Diabetes ([facility])    Hyponatremia    Morbid obesity ([facility])    Chest pain    Anemia of chronic disease    Chronic acquired lymphedema    Acute respiratory failure with hypoxia ([facility])    Wound of abdomen    SIRS (systemic inflammatory response syndrome) ([facility])    Past Medical History  Past Medical History:   Diagnosis Date    Acute hypoxic respiratory failure ([facility]) 10/07/2023    Arthritis     Breathing difficulty     Cellulitis 10/07/2023    Coronary artery disease     Diabetes mellitus ([facility])     Diabetic neuropathy ([facility]) 05/28/2021    Heart disease     CAD   s/p ptca with 1 stent 2012    Hidradenitis suppurativa     groin    History of transfusion     Hyperlipidemia     Hypertension     MI (myocardial infarction) ([facility])     \" silent \" M.I. in the past    Morbid obesity with BMI of 50.0-59.9, adult ([facility])     Nicotine dependence     Obesity     Pilonidal cyst     Type 1 non-ST elevation myocardial infarction (NSTEMI) ([facility]) 11/29/2020     Past Surgical History  Past Surgical History:   Procedure Laterality Date    CARDIAC SURGERY      CORONARY ANGIOPLASTY WITH STENT PLACEMENT      INCISION AND DRAINAGE OF WOUND N/A 1/24/2017    Procedure: INCISION AND DRAINAGE (I&D) BUTTOCK, PILONIDAL CYST;  Surgeon: Simba Adhikari MD;  Location: [facility];  Service:     IR TEMPORARY DIALYSIS CATHETER PLACEMENT  6/6/2024    LEG SURGERY Left     I&D of left leg 2012    HI DEBRIDEMENT SUBCUTANEOUS TISSUE 1ST 20 SQ CM/< Left 7/6/2021    Procedure: DEBRIDEMENT WOUND (WASH OUT);  Surgeon: Sudheer Mcfarlane MD;  Location: [facility];  Service: General    HI EXC B9 LESION MRGN XCP SK TG T/A/L >4.0 CM Left 4/6/2021    Procedure: EXCISION THIGH MASS;  Surgeon: Sudheer Mcfarlane MD;  [facility];  Service: General    HI EXCISION " H/P/P/U COMPLEX REPAIR Left 7/6/2021    Procedure: EXCISION PERINEAL ABSCESS LEFT THIGH;  Surgeon: Sudheer Mcfarlane MD;  [facility];  Service: General    KS NEGATIVE PRESSURE WOUND THERAPY DME <= 50 SQ CM Left 4/6/2021    Procedure: APPLICATION VAC DRESSING THIGH;  Surgeon: Sudheer Mcfarlane MD;  [facility];  Service: General    WOUND DEBRIDEMENT Left 4/17/2021    Procedure: DEBRIDEMENT WOUND AND DRESSING CHANGE (WASH OUT);  Surgeon: Mahin Traore DO;  [facility];  Service: General    WOUND DEBRIDEMENT Left 4/22/2021    Procedure: DEBRIDEMENT LOWER EXTREMITY (WASH OUT), left groin and thigh;  Surgeon: Sudheer Mcfarlane MD;  [facility];  Service: General    WOUND DEBRIDEMENT Left 5/26/2021    Procedure: DEBRIDEMENT LOWER EXTREMITY (WASH OUT);  Surgeon: Zak Castanon DO;  [facility];  Service: General    WOUND DEBRIDEMENT Left 5/28/2021    Procedure: Washout left thigh wound and closure;  Surgeon: Amor Jaramillo DO;  [facility];  Service: General         06/12/24 0818   Note Type   Note type Cancelled Session  (Wed 6/12/24)   Cancel Reasons Patient off floor/test  (Per Anna QUINN pt is off floor for biopsy)   Additional Comments OT orders received and chart review completed. Pt off floor for biopsy. Will cancel and continue to follow as appropriate / schedule allows.   Licensure   [place] License Number  Katie Pathak OTR/L [id number]     Katie Pathak OTR/L  [id number]  [id number]

## 2024-06-13 ENCOUNTER — APPOINTMENT (INPATIENT)
Dept: DIALYSIS | Facility: HOSPITAL | Age: 44
DRG: 673 | End: 2024-06-13
Payer: COMMERCIAL

## 2024-06-13 LAB
ANION GAP SERPL CALCULATED.3IONS-SCNC: 11 MMOL/L (ref 4–13)
BUN SERPL-MCNC: 67 MG/DL (ref 5–25)
CALCIUM SERPL-MCNC: 8 MG/DL (ref 8.4–10.2)
CHLORIDE SERPL-SCNC: 96 MMOL/L (ref 96–108)
CO2 SERPL-SCNC: 24 MMOL/L (ref 21–32)
CREAT SERPL-MCNC: 8.94 MG/DL (ref 0.6–1.3)
ERYTHROCYTE [DISTWIDTH] IN BLOOD BY AUTOMATED COUNT: 14.5 % (ref 11.6–15.1)
GFR SERPL CREATININE-BSD FRML MDRD: 6 ML/MIN/1.73SQ M
GLUCOSE SERPL-MCNC: 107 MG/DL (ref 65–140)
GLUCOSE SERPL-MCNC: 142 MG/DL (ref 65–140)
GLUCOSE SERPL-MCNC: 146 MG/DL (ref 65–140)
GLUCOSE SERPL-MCNC: 91 MG/DL (ref 65–140)
GLUCOSE SERPL-MCNC: 92 MG/DL (ref 65–140)
HCT VFR BLD AUTO: 22.8 % (ref 36.5–49.3)
HGB BLD-MCNC: 7 G/DL (ref 12–17)
MCH RBC QN AUTO: 27.7 PG (ref 26.8–34.3)
MCHC RBC AUTO-ENTMCNC: 30.7 G/DL (ref 31.4–37.4)
MCV RBC AUTO: 90 FL (ref 82–98)
PLATELET # BLD AUTO: 284 THOUSANDS/UL (ref 149–390)
PMV BLD AUTO: 8.1 FL (ref 8.9–12.7)
POTASSIUM SERPL-SCNC: 4.4 MMOL/L (ref 3.5–5.3)
RBC # BLD AUTO: 2.53 MILLION/UL (ref 3.88–5.62)
SODIUM SERPL-SCNC: 131 MMOL/L (ref 135–147)
VANCOMYCIN SERPL-MCNC: 27.5 UG/ML (ref 10–20)
WBC # BLD AUTO: 11.51 THOUSAND/UL (ref 4.31–10.16)

## 2024-06-13 PROCEDURE — 94760 N-INVAS EAR/PLS OXIMETRY 1: CPT

## 2024-06-13 PROCEDURE — 80048 BASIC METABOLIC PNL TOTAL CA: CPT | Performed by: NURSE PRACTITIONER

## 2024-06-13 PROCEDURE — 82948 REAGENT STRIP/BLOOD GLUCOSE: CPT

## 2024-06-13 PROCEDURE — 87205 SMEAR GRAM STAIN: CPT | Performed by: INTERNAL MEDICINE

## 2024-06-13 PROCEDURE — 87070 CULTURE OTHR SPECIMN AEROBIC: CPT | Performed by: INTERNAL MEDICINE

## 2024-06-13 PROCEDURE — 0J980ZZ DRAINAGE OF ABDOMEN SUBCUTANEOUS TISSUE AND FASCIA, OPEN APPROACH: ICD-10-PCS | Performed by: SURGERY

## 2024-06-13 PROCEDURE — 10061 I&D ABSCESS COMP/MULTIPLE: CPT | Performed by: PHYSICIAN ASSISTANT

## 2024-06-13 PROCEDURE — 99232 SBSQ HOSP IP/OBS MODERATE 35: CPT | Performed by: STUDENT IN AN ORGANIZED HEALTH CARE EDUCATION/TRAINING PROGRAM

## 2024-06-13 PROCEDURE — 87147 CULTURE TYPE IMMUNOLOGIC: CPT | Performed by: INTERNAL MEDICINE

## 2024-06-13 PROCEDURE — 85027 COMPLETE CBC AUTOMATED: CPT | Performed by: NURSE PRACTITIONER

## 2024-06-13 PROCEDURE — 80202 ASSAY OF VANCOMYCIN: CPT | Performed by: NURSE PRACTITIONER

## 2024-06-13 PROCEDURE — NC001 PR NO CHARGE: Performed by: RADIOLOGY

## 2024-06-13 PROCEDURE — 99232 SBSQ HOSP IP/OBS MODERATE 35: CPT | Performed by: INTERNAL MEDICINE

## 2024-06-13 RX ORDER — CEFAZOLIN SODIUM 2 G/50ML
2000 SOLUTION INTRAVENOUS EVERY 12 HOURS
Status: COMPLETED | OUTPATIENT
Start: 2024-06-13 | End: 2024-06-14

## 2024-06-13 RX ADMIN — ACETAMINOPHEN 650 MG: 325 TABLET ORAL at 12:53

## 2024-06-13 RX ADMIN — Medication 5000 UNITS: at 09:04

## 2024-06-13 RX ADMIN — ACETAMINOPHEN 650 MG: 325 TABLET ORAL at 23:17

## 2024-06-13 RX ADMIN — NYSTATIN 1 APPLICATION: 100000 POWDER TOPICAL at 09:15

## 2024-06-13 RX ADMIN — ACETAMINOPHEN 650 MG: 325 TABLET ORAL at 06:31

## 2024-06-13 RX ADMIN — Medication 2.5 MG: at 22:44

## 2024-06-13 RX ADMIN — HYOSCYAMINE SULFATE 1 APPLICATION: 16 SOLUTION at 09:15

## 2024-06-13 RX ADMIN — HEPARIN SODIUM 5000 UNITS: 5000 INJECTION, SOLUTION INTRAVENOUS; SUBCUTANEOUS at 06:31

## 2024-06-13 RX ADMIN — CEFAZOLIN SODIUM 2000 MG: 2 SOLUTION INTRAVENOUS at 22:46

## 2024-06-13 RX ADMIN — ATORVASTATIN CALCIUM 80 MG: 80 TABLET, FILM COATED ORAL at 08:15

## 2024-06-13 RX ADMIN — LIDOCAINE 5% 1 PATCH: 700 PATCH TOPICAL at 08:15

## 2024-06-13 RX ADMIN — CYANOCOBALAMIN TAB 500 MCG 1000 MCG: 500 TAB at 08:15

## 2024-06-13 RX ADMIN — HEPARIN SODIUM 5000 UNITS: 5000 INJECTION, SOLUTION INTRAVENOUS; SUBCUTANEOUS at 14:00

## 2024-06-13 RX ADMIN — NYSTATIN 1 APPLICATION: 100000 POWDER TOPICAL at 17:41

## 2024-06-13 RX ADMIN — HEPARIN SODIUM 5000 UNITS: 5000 INJECTION, SOLUTION INTRAVENOUS; SUBCUTANEOUS at 22:44

## 2024-06-13 RX ADMIN — DOCUSATE SODIUM 100 MG: 100 CAPSULE, LIQUID FILLED ORAL at 08:15

## 2024-06-13 RX ADMIN — ACETAMINOPHEN 650 MG: 325 TABLET ORAL at 00:53

## 2024-06-13 RX ADMIN — GABAPENTIN 300 MG: 300 CAPSULE ORAL at 17:41

## 2024-06-13 RX ADMIN — ACETAMINOPHEN 650 MG: 325 TABLET ORAL at 17:40

## 2024-06-13 RX ADMIN — BUMETANIDE 2 MG: 1 TABLET ORAL at 08:15

## 2024-06-13 RX ADMIN — Medication 2.5 MG: at 17:40

## 2024-06-13 RX ADMIN — GABAPENTIN 300 MG: 300 CAPSULE ORAL at 08:15

## 2024-06-13 RX ADMIN — DOCUSATE SODIUM 100 MG: 100 CAPSULE, LIQUID FILLED ORAL at 17:41

## 2024-06-13 RX ADMIN — Medication 2.5 MG: at 03:44

## 2024-06-13 NOTE — PLAN OF CARE
Target UF Goal 2 L as tolerated. Patient dialyzing fo 3.5h  on 3 K bath for serum K of  4.4  per protocol. Treatment plan reviewed with Nephrology.   Problem: METABOLIC, FLUID AND ELECTROLYTES - ADULT  Goal: Electrolytes maintained within normal limits  Description: INTERVENTIONS:  - Monitor labs and assess patient for signs and symptoms of electrolyte imbalances  - Administer electrolyte replacement as ordered  - Monitor response to electrolyte replacements, including repeat lab results as appropriate  - Instruct patient on fluid and nutrition as appropriate  Outcome: Progressing  Goal: Fluid balance maintained  Description: INTERVENTIONS:  - Monitor labs   - Monitor I/O and WT  - Instruct patient on fluid and nutrition as appropriate  - Assess for signs & symptoms of volume excess or deficit  Outcome: Progressing   Post-Dialysis RN Treatment Note    Blood Pressure:  Pre 131/69 mm/Hg  Post 138/73 mmHg   EDW  TBD kg    Weight:  Pre 185.7 kg   Post 183.7 kg   Mode of weight measurement: Bed Scale   Volume Removed  2000 ml    Treatment duration 210 minutes    NS given  No    Treatment shortened? No   Medications given during Rx None Reported Withheld midodrine due to high BP   Estimated Kt/V  None Reported   Access type: Temporary HD catheter   Access Issues: No    Report called to primary nurse   Yes Arden Bourne RN

## 2024-06-13 NOTE — QUICK NOTE
"Developing superficial left pannus abscess over the last 24 hours.   Incision and drain    Date/Time: 6/13/2024 1:05 PM    Performed by: El William PA-C  Authorized by: El William PA-C  Universal Protocol:  Consent: Verbal consent obtained.  Risks and benefits: risks, benefits and alternatives were discussed  Consent given by: patient  Time out: Immediately prior to procedure a \"time out\" was called to verify the correct patient, procedure, equipment, support staff and site/side marked as required.  Patient understanding: patient states understanding of the procedure being performed  Patient consent: the patient's understanding of the procedure matches consent given  Patient identity confirmed: verbally with patient and arm band    Patient location:  Bedside  Location:     Type:  Abscess    Size:  3cm    Location:  Trunk (left pannus)    Trunk location:  Abdomen  Pre-procedure details:     Skin preparation:  Betadine  Anesthesia (see MAR for exact dosages):     Anesthesia method:  Topical application    Topical anesthesia: pain ease spray.  Procedure details:     Complexity:  Simple    Needle aspiration: no      Incision types:  Stab incision    Scalpel blade:  11    Approach:  Open    Incision depth:  Subcutaneous and submucosal    Wound management:  Probed and deloculated and irrigated with saline    Drainage:  Bloody and purulent    Drainage amount: small amount.    Wound treatment:  Wound left open    Packing materials:  1/4 in iodoform gauze  Post-procedure details:     Patient tolerance of procedure:  Tolerated well, no immediate complications      "

## 2024-06-13 NOTE — PROGRESS NOTES
[facility]  Progress Note  Name: Joaquin Frankel I  MRN: [id number]  Unit/Bed#: [address] [place] [address]-01 I Date of Admission: 6/3/2024   Date of Service: 6/13/2024 I Hospital Day: 10    Assessment & Plan   SIRS (systemic inflammatory response syndrome) ([facility])  Assessment & Plan  Suspect secondary to left lower extremity cellulitis, which appears to have improved  CT left lower extremity-extensive subcutaneous edema and skin thickening  CT chest abdomen pelvis-without any acute abnormality  Blood cultures negative, MRSA surveillance negative  Noted to have draining abscess under the pannus.  Cultures obtained  Surgical consult for I&D  Continue cefazolin  Monitor clinically  Follow-up venous Doppler    Acute respiratory failure with hypoxia ([facility])  Assessment & Plan  Noted to have worsening hypoxia during hospitalization requiring MFNC  Likely multifactorial including volume overload, suspected underlying obstructive sleep apnea and obesity hypoventilation syndrome.    No evidence of pulmonary emboli on diagnostic studies.  CT chest with evidence of bibasilar effusion with compressive atelectasis  Continue ultrafiltration as tolerated  Continue supplemental oxygen as needed  Incentive spirometry  Patient states he has an upcoming appointment with a sleep specialist and ultimately will undergo a sleep study as an outpatient.  He has been told he has overnight hypoxia.   Overnight pulse ox study qualifies patient for CPAP.      * CLOVIS (acute kidney injury) ([facility])  Assessment & Plan  POA with creatinine of 6.40.    Baseline creatinine 1.3-1.4  Noted to have worsening creatinine: Peaked at 10.42.  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  UA CR - 5280  No evidence of hydronephrosis or obstruction on CT imaging.  Serum and urine immunofixation showing IgG kappa monoclonal  band  Initially on CVV HD, now HD TTS  Continue HD as per nephrology, HD today  Volume removal as tolerated  Follow-up labs  Initially planned for renal biopsy but deferred due to high risk.  Status post bone marrow biopsy 6/12  Follow-up workup as per nephrology and hematology  IR consult for permacath placement    outpatient dialysis arrangement    Wound of abdomen  Assessment & Plan  Seen by wound care, recommended general surgery consultation  Appreciate surgery input  Continue local wound care per wound care nurse  Follow-up in the outpatient wound center    Anemia of chronic disease  Assessment & Plan  Patient has anemia of chronic disease likely secondary to underlying nephropathy,?  Myeloma.      He did have some episodes of hematuria.  No further overt bleeding  Hemoglobin gradually down trended to 6.7 g/dL, status post 1 PRBC on 6/11  B12 310, add supplementation  Iron panel shows iron deficiency.  Status post IV venofer 200mg every other day x 2  Continue to monitor hemoglobin closely and transfuse for hemoglobin less than 7.0 or drastic drop in hemoglobin.    Chest pain  Assessment & Plan  POA.  Denies chest discomfort at present  VQ scan low suspicion for PE  Echo with normal EF and grade 1 diastolic dysfunction  Cardiology input appreciated  ASA on hold for biopsy     Hyponatremia  Assessment & Plan  Monitor    Diabetes (HCC)  Assessment & Plan  Lab Results   Component Value Date    HGBA1C 5.7 (H) 06/09/2024     Recent Labs     06/13/24  0706 06/13/24  1052 06/13/24  1544 06/13/24 2014   POCGLU 92 107 142* 146*     Blood Sugar Average: Last 72 hrs:  (P) [id number]    Amaryl held during his hospitalization.   Acceptable, continue to monitor on current regimen  Continue diabetic diet.      Hypertension  Assessment & Plan  Holding amlodipine and carvedilol for low blood pressure  Requiring midodrine predialysis  Blood pressure stable/improving at present   on Bumex 2 mg daily  Monitor intake  output, daily weight  Monitor blood pressure    Coronary artery disease  Assessment & Plan  Patient with a history of coronary artery disease with multivessel disease status post stents.    Holding aspirin  Continue statin  Cardiology following and appreciate their input.    Chronic acquired lymphedema  Assessment & Plan  Patient developed LLE lymphedema after left lower extremity surgical procedure several years ago.  Patient complaining of increasing pain in the left lower extremity likely secondary to volume overload.  Left lower extremity venous Doppler: Pending  Patient would benefit from ambulatory referral to lymphedema clinic.    Morbid obesity (HCC)  Assessment & Plan  With BMI of 64.42 which is good  Encourage lifestyle modification on discharge               VTE Pharmacologic Prophylaxis: VTE Score: 7 Moderate Risk (Score 3-4) - Pharmacological DVT Prophylaxis Ordered: heparin.    Mobility:   Basic Mobility Inpatient Raw Score: 17  JH-HLM Goal: 5: Stand one or more mins  JH-HLM Achieved: 6: Walk 10 steps or more  JH-HLM Goal NOT achieved. Continue with multidisciplinary rounding and encourage appropriate mobility to improve upon JH-HLM goals.    Patient Centered Rounds: I performed bedside rounds with nursing staff today.   Discussions with Specialists or Other Care Team Provider: Nephrology    Education and Discussions with Family / Patient:  Discussed with the patient.     Total Time Spent on Date of Encounter in care of patient: 45 mins. This time was spent on one or more of the following: performing physical exam; counseling and coordination of care; obtaining or reviewing history; documenting in the medical record; reviewing/ordering tests, medications or procedures; communicating with other healthcare professionals and discussing with patient's family/caregivers.    Current Length of Stay: 10 day(s)  Current Patient Status: Inpatient   Certification Statement: The patient will continue to require  "additional inpatient hospital stay due to acute kidney injury  Discharge Plan: Anticipate discharge in >72 hrs to discharge location to be determined pending rehab evaluations.    Code Status: Level 1 - Full Code    Subjective:   Reports mild back discomfort after bilateral biopsy  Also reported having noted \"cyst\" under abdominal pannus with drainage  Denies fever, chills        Objective:     Vitals:   Temp (24hrs), Av.5 °F (36.4 °C), Min:97.2 °F (36.2 °C), Max:97.6 °F (36.4 °C)    Temp:  [97.2 °F (36.2 °C)-97.6 °F (36.4 °C)] 97.5 °F (36.4 °C)  HR:  [59-70] 66  Resp:  [18-19] 19  BP: (126-143)/(58-78) 128/62  SpO2:  [90 %-100 %] 93 %  Body mass index is 64.37 kg/m².     Input and Output Summary (last 24 hours):   No intake or output data in the 24 hours ending 24 0826      Physical Exam:   Physical Exam  Vitals and nursing note reviewed.   Constitutional:       General: He is not in acute distress.     Appearance: He is well-developed. He is obese.   HENT:      Head: Normocephalic and atraumatic.   Cardiovascular:      Rate and Rhythm: Normal rate and regular rhythm.      Heart sounds: No murmur heard.     Comments: Right IJ dialysis catheter  Pulmonary:      Effort: Pulmonary effort is normal. No respiratory distress.      Breath sounds: Decreased breath sounds present.   Abdominal:      Palpations: Abdomen is soft.      Tenderness: There is no abdominal tenderness.      Comments: Abdominal wall edema, intertriginous rash.  Small abscess with purulent drainage noted under the pannus.  Culture obtained   Musculoskeletal:         General: No swelling.      Right lower leg: Edema present.      Left lower leg: Edema present.      Comments: Left lower extremity erythema improving, superimposed on left lower extremity lymphedema   Skin:     General: Skin is warm and dry.      Capillary Refill: Capillary refill takes less than 2 seconds.   Neurological:      Mental Status: He is alert.   Psychiatric:         " Mood and Affect: Mood normal.            Additional Data:     Labs:  Results from last 7 days   Lab Units 24  0640 24  0631 24  0607   WBC Thousand/uL 11.51* 10.22* 11.26*   HEMOGLOBIN g/dL 7.0* 7.3* 6.9*   HEMATOCRIT % 22.8* 23.0* 22.0*   PLATELETS Thousands/uL 284 298 317   BANDS PCT %  --  1  --    SEGS PCT %  --   --  82*   LYMPHO PCT %  --  5 6*   MONO PCT %  --  4 8   EOS PCT %  --  5 2     Results from last 7 days   Lab Units 24  0640   SODIUM mmol/L 131*   POTASSIUM mmol/L 4.4   CHLORIDE mmol/L 96   CO2 mmol/L 24   BUN mg/dL 67*   CREATININE mg/dL 8.94*   ANION GAP mmol/L 11   CALCIUM mg/dL 8.0*   GLUCOSE RANDOM mg/dL 91     Results from last 7 days   Lab Units 24  0631   INR  1.13     Results from last 7 days   Lab Units 24  0706 24  2051 24  1557 24  1131 24  0756 24  2100 24  1533 24  1146 24  0747 24  0617 06/10/24  2014 06/10/24  1526   POC GLUCOSE mg/dl 92 123 127 98 107 117 113 130 88 72 83 80     Results from last 7 days   Lab Units 24  1538   HEMOGLOBIN A1C % 5.7*     Results from last 7 days   Lab Units 24  1538 24  0450 24  0558 24  1215   LACTIC ACID mmol/L 0.4*  --   --  0.3*   PROCALCITONIN ng/ml  --  10.26* 7.47* 2.85*       Lines/Drains:  Invasive Devices       Peripheral Intravenous Line  Duration             Peripheral IV 24 Left;Upper Arm 3 days    Peripheral IV 24 Right;Upper Arm 3 days              Hemodialysis Catheter  Duration             HD Temporary Double Catheter 2 days                      Telemetry:  Telemetry Orders (From admission, onward)               Temporary telemetry monitoring  (Hemodialysis Initiation Treatment)  Continuous x 24 Hours (Telem)           Question:  Indication  Answer:  During dialysis for arrythmia monitoring                     Telemetry Reviewed: Normal Sinus Rhythm  Indication for Continued Telemetry Use: No  indication for continued use. Will discontinue.              Imaging: Reviewed radiology reports from this admission including: chest xray, chest CT scan, and abdominal/pelvic CT    Recent Cultures (last 7 days):   Results from last 7 days   Lab Units 06/07/24  1230 06/07/24  1200   BLOOD CULTURE  No Growth After 5 Days. No Growth After 5 Days.       Last 24 Hours Medication List:   Current Facility-Administered Medications   Medication Dose Route Frequency Provider Last Rate    acetaminophen  650 mg Oral Q6H [facility] CRNP      atorvastatin  80 mg Oral Daily [facility] CRNP      bumetanide  2 mg Oral Daily Joselyn Reyes Bahamonde, MD      cefazolin  2,000 mg Intravenous Q12H Dennis Waterman MD      Cholecalciferol  5,000 Units Oral Daily [facility] Shoemaker, CRNP      cyanocobalamin  1,000 mcg Oral Daily [facility]      docusate sodium  100 mg Oral BID [facility] Jorgeker, CRNP      gabapentin  300 mg Oral BID [facility] Bear, CRNP      heparin (porcine)  5,000 Units Subcutaneous Q8H SHAZIA [facility] Jorgeker, CRNP      insulin lispro  1-6 Units Subcutaneous HS [facility]      insulin lispro  2-12 Units Subcutaneous TID AC [facility] Bear, CRNP      lidocaine  1 patch Topical Daily [facility], CRNP      midodrine  5 mg Oral Before Dialysis [facility] Bear, CRNP      nystatin  1 Application Topical BID [facility] Bear, CRNP      ondansetron  4 mg Intravenous Q6H PRN [facility] Bear, CRNP      oxyCODONE  2.5 mg Oral Q4H PRN [facility] Bear, CRNP      polyethylene glycol  17 g Oral Daily PRN [facility] Bear, CRNP      senna  1 tablet Oral HS [facility], CRNP      sodium hypochlorite  1 Application Irrigation Daily [facility] Bear, TOYANP          Today, Patient Was Seen By: Dennis Waterman MD    **Please Note: This  note may have been constructed using a voice recognition system.**

## 2024-06-13 NOTE — PLAN OF CARE
Problem: Prexisting or High Potential for Compromised Skin Integrity  Goal: Skin integrity is maintained or improved  Description: INTERVENTIONS:  - Identify patients at risk for skin breakdown  - Assess and monitor skin integrity  - Assess and monitor nutrition and hydration status  - Monitor labs   - Assess for incontinence   - Turn and reposition patient  - Assist with mobility/ambulation  - Relieve pressure over bony prominences  - Avoid friction and shearing  - Provide appropriate hygiene as needed including keeping skin clean and dry  - Evaluate need for skin moisturizer/barrier cream  - Collaborate with interdisciplinary team   - Patient/family teaching  - Consider wound care consult   6/13/2024 0747 by Jackendie Saint-Julien, RN  Outcome: Progressing  6/13/2024 0050 by Jackendie Saint-Julien, RN  Outcome: Progressing     Problem: PAIN - ADULT  Goal: Verbalizes/displays adequate comfort level or baseline comfort level  Description: Interventions:  - Encourage patient to monitor pain and request assistance  - Assess pain using appropriate pain scale  - Administer analgesics based on type and severity of pain and evaluate response  - Implement non-pharmacological measures as appropriate and evaluate response  - Consider cultural and social influences on pain and pain management  - Notify physician/advanced practitioner if interventions unsuccessful or patient reports new pain  6/13/2024 0747 by Jackendie Saint-Julien, RN  Outcome: Progressing  6/13/2024 0050 by Jackendie Saint-Julien, RN  Outcome: Progressing     Problem: INFECTION - ADULT  Goal: Absence or prevention of progression during hospitalization  Description: INTERVENTIONS:  - Assess and monitor for signs and symptoms of infection  - Monitor lab/diagnostic results  - Monitor all insertion sites, i.e. indwelling lines, tubes, and drains  - [place] appropriate cooling/warming therapies per order  - Administer medications as ordered  - Instruct and  encourage patient and family to use good hand hygiene technique  - Identify and instruct in appropriate isolation precautions for identified infection/condition  6/13/2024 0747 by Jackendie Saint-Julien, RN  Outcome: Progressing  6/13/2024 0050 by Jackendie Saint-Julien, RN  Outcome: Progressing     Problem: SAFETY ADULT  Goal: Patient will remain free of falls  Description: INTERVENTIONS:  - Educate patient/family on patient safety including physical limitations  - Instruct patient to call for assistance with activity   - Consult OT/PT to assist with strengthening/mobility   - Keep Call bell within reach  - Keep bed low and locked with side rails adjusted as appropriate  - Keep care items and personal belongings within reach  - Initiate and maintain comfort rounds  - Make Fall Risk Sign visible to staff  - Apply yellow socks and bracelet for high fall risk patients  - Consider moving patient to room near nurses station  6/13/2024 0747 by Jackendie Saint-Julien, RN  Outcome: Progressing  6/13/2024 0050 by Jackendie Saint-Julien, RN  Outcome: Progressing  Goal: Maintain or return to baseline ADL function  Description: INTERVENTIONS:  -  Assess patient's ability to carry out ADLs; assess patient's baseline for ADL function and identify physical deficits which impact ability to perform ADLs (bathing, care of mouth/teeth, toileting, grooming, dressing, etc.)  - Assess/evaluate cause of self-care deficits   - Assess range of motion  - Assess patient's mobility; develop plan if impaired  - Assess patient's need for assistive devices and provide as appropriate  - Encourage maximum independence but intervene and supervise when necessary  - Involve family in performance of ADLs  - Assess for home care needs following discharge   - Consider OT consult to assist with ADL evaluation and planning for discharge  - Provide patient education as appropriate  6/13/2024 0747 by Jackendie Saint-Julien, RN  Outcome:  Progressing  6/13/2024 0050 by Jackendie Saint-Julien, RN  Outcome: Progressing  Goal: Maintains/Returns to pre admission functional level  Description: INTERVENTIONS:  - Perform AM-PAC 6 Click Basic Mobility/ Daily Activity assessment daily.  - Set and communicate daily mobility goal to care team and patient/family/caregiver.   - Collaborate with rehabilitation services on mobility goals if consulted  - Out of bed for toileting  - Record patient progress and toleration of activity level   6/13/2024 0747 by Jackendie Saint-Julien, RN  Outcome: Progressing  6/13/2024 0050 by Jackendie Saint-Julien, RN  Outcome: Progressing     Problem: CARDIOVASCULAR - ADULT  Goal: Maintains optimal cardiac output and hemodynamic stability  Description: INTERVENTIONS:  - Monitor I/O, vital signs and rhythm  - Monitor for S/S and trends of decreased cardiac output  - Administer and titrate ordered vasoactive medications to optimize hemodynamic stability  - Assess quality of pulses, skin color and temperature  - Assess for signs of decreased coronary artery perfusion  - Instruct patient to report change in severity of symptoms  6/13/2024 0747 by Jackendie Saint-Julien, RN  Outcome: Progressing  6/13/2024 0050 by Jackendie Saint-Julien, RN  Outcome: Progressing  Goal: Absence of cardiac dysrhythmias or at baseline rhythm  Description: INTERVENTIONS:  - Continuous cardiac monitoring, vital signs, obtain 12 lead EKG if ordered  - Administer antiarrhythmic and heart rate control medications as ordered  - Monitor electrolytes and administer replacement therapy as ordered  6/13/2024 0747 by Jackendie Saint-Julien, RN  Outcome: Progressing  6/13/2024 0050 by Jackendie Saint-Julien, RN  Outcome: Progressing     Problem: RESPIRATORY - ADULT  Goal: Achieves optimal ventilation and oxygenation  Description: INTERVENTIONS:  - Assess for changes in respiratory status  - Assess for changes in mentation and behavior  - Position to facilitate  oxygenation and minimize respiratory effort  - Oxygen administered by appropriate delivery if ordered  - Initiate smoking cessation education as indicated  - Encourage broncho-pulmonary hygiene including cough, deep breathe, Incentive Spirometry  - Assess the need for suctioning and aspirate as needed  - Assess and instruct to report SOB or any respiratory difficulty  - Respiratory Therapy support as indicated  6/13/2024 0747 by Jackendie Saint-Julien, RN  Outcome: Progressing  6/13/2024 0050 by Jackendie Saint-Julien, RN  Outcome: Progressing     Problem: SKIN/TISSUE INTEGRITY - ADULT  Goal: Incision(s), wounds(s) or drain site(s) healing without S/S of infection  Description: INTERVENTIONS  - Assess and document dressing, incision, wound bed, drain sites and surrounding tissue  - Provide patient and family education  - Perform skin care/dressing changes  6/13/2024 0747 by Jackendie Saint-Julien, RN  Outcome: Progressing  6/13/2024 0050 by Jackendie Saint-Julien, RN  Outcome: Progressing  Goal: Skin Integrity remains intact(Skin Breakdown Prevention)  Description: Assess:  -Perform Juan assessment every shifts  -Clean and moisturize skin every 2hrs  -Inspect skin when repositioning, toileting, and assisting with ADLS  -Assess under medical devices such as Nc every Shifts  -Assess extremities for adequate circulation and sensation     Bed Management:  -Have minimal linens on bed & keep smooth, unwrinkled  -Change linens as needed when moist or perspiring  -Avoid sitting or lying in one position for more than 2 hours while in bed  -Keep HOB at 45 degrees     Toileting:  -Offer bedside commode  -Assess for incontinence every 2hrs  -Use incontinent care products after each incontinent episode such as protective barriers    Activity:  -Mobilize patient 3 times a day  -Encourage activity and walks on unit  -Encourage or provide ROM exercises   -Turn and reposition patient every 2 Hours  -Use appropriate equipment to  lift or move patient in bed  -Instruct/ Assist with weight shifting every  when out of bed in chair  -Consider limitation of chair time 2 hour intervals    Skin Care:  -Avoid use of baby powder, tape, friction and shearing, hot water or constrictive clothing  -Relieve pressure over bony prominences using edges  -Do not massage red bony areas    Next Steps:  -Teach patient strategies to minimize risks such as skin breakdown   -Consider consults to  interdisciplinary teams such as   6/13/2024 0747 by Jackendie Saint-Julien, RN  Outcome: Progressing  6/13/2024 0050 by Jackendie Saint-Julien, RN  Outcome: Progressing     Problem: NEUROSENSORY - ADULT  Goal: Achieves maximal functionality and self care  Description: INTERVENTIONS  - Monitor swallowing and airway patency with patient fatigue and changes in neurological status  - Encourage and assist patient to increase activity and self care.   - Encourage visually impaired, hearing impaired and aphasic patients to use assistive/communication devices  6/13/2024 0747 by Jackendie Saint-Julien, RN  Outcome: Progressing  6/13/2024 0050 by Jackendie Saint-Julien, RN  Outcome: Progressing     Problem: METABOLIC, FLUID AND ELECTROLYTES - ADULT  Goal: Electrolytes maintained within normal limits  Description: INTERVENTIONS:  - Monitor labs and assess patient for signs and symptoms of electrolyte imbalances  - Administer electrolyte replacement as ordered  - Monitor response to electrolyte replacements, including repeat lab results as appropriate  - Instruct patient on fluid and nutrition as appropriate  6/13/2024 0747 by Jackendie Saint-Julien, RN  Outcome: Progressing  6/13/2024 0050 by Jackendie Saint-Julien, RN  Outcome: Progressing  Goal: Fluid balance maintained  Description: INTERVENTIONS:  - Monitor labs   - Monitor I/O and WT  - Instruct patient on fluid and nutrition as appropriate  - Assess for signs & symptoms of volume excess or deficit  6/13/2024 0747 by Mana  Saint-Julien, RN  Outcome: Progressing  6/13/2024 0050 by Jackendie Saint-Julien, RN  Outcome: Progressing  Goal: Glucose maintained within target range  Description: INTERVENTIONS:  - Monitor Blood Glucose as ordered  - Assess for signs and symptoms of hyperglycemia and hypoglycemia  - Administer ordered medications to maintain glucose within target range  - Assess nutritional intake and initiate nutrition service referral as needed  6/13/2024 0747 by Jackendie Saint-Julien, RN  Outcome: Progressing  6/13/2024 0050 by Jackendie Saint-Julien, RN  Outcome: Progressing     Problem: MUSCULOSKELETAL - ADULT  Goal: Maintain or return mobility to safest level of function  Description: INTERVENTIONS:  - Assess patient's ability to carry out ADLs; assess patient's baseline for ADL function and identify physical deficits which impact ability to perform ADLs (bathing, care of mouth/teeth, toileting, grooming, dressing, etc.)  - Assess/evaluate cause of self-care deficits   - Assess range of motion  - Assess patient's mobility  - Assess patient's need for assistive devices and provide as appropriate  - Encourage maximum independence but intervene and supervise when necessary  - Involve family in performance of ADLs  - Assess for home care needs following discharge   - Consider OT consult to assist with ADL evaluation and planning for discharge  - Provide patient education as appropriate  6/13/2024 0747 by Jackendie Saint-Julien, RN  Outcome: Progressing  6/13/2024 0050 by Jackendie Saint-Julien, RN  Outcome: Progressing  Goal: Maintain proper alignment of affected body part  Description: INTERVENTIONS:  - Support, maintain and protect limb and body alignment  - Provide patient/ family with appropriate education  6/13/2024 0747 by Jackendie Saint-Julien, RN  Outcome: Progressing  6/13/2024 0050 by Jackendie Saint-Julien, RN  Outcome: Progressing

## 2024-06-13 NOTE — PROGRESS NOTES
NEPHROLOGY PROGRESS NOTE   Joaquin Frankel 44 y.o. male MRN: [id number]  Unit/Bed#: [address] [place] [address] Encounter: [id number]  Reason for Consult: CLOVIS    ASSESSMENT AND PLAN:  45 yo man with PMH of morbid obesity, lower extremity lymphedema, diabetes, hypertension, CAD p/w shortness of breath for the last 2 weeks.  Nephrology is consulted for management of CLOVIS        PLAN:     # Anuric KDIGO CLOVIS stage 3 on CKD G3aA3, HD dependent TTS  Etiology: High suspicious of MGRS with possible multiple myeloma +/- diabetic glomerulopathy however rapid progression it is very uncommon  Baseline creatinine 1.3 to 1.4 mg/dL with previous episodes of CLOVIS  Peak creatinine: 10.42, before dialysis   current creatinine: 8.97, HD dependent  UA: Hematuria, proteinuria  UACR  5280  Renal imaging : No hydronephrosis  High risk of kidney biopsy due to morbid obesity, fluid overload.  Patient underwent bone marrow biopsy on 6/12, awaiting results   Treatment:  No evidence of kidney recovery will continue with HD TTS   Will continue to monitor signs of kidney recovery  Maintain MAP:  Over 65 mmHg if possible/avoid hypoperfusion:  Hold parameters on blood pressure medications  Avoid nephrotoxic agents such as NSAIDs, and IV contrast if possible. 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  We will discontinue the next 48 hours if patient remains anuric  Midodrine during dialysis pretension     #Anemia:  Current hemoglobin: 7 mg/dL  Possible secondary to monoclonal gammopathy  No evidence of hemolysis    #monoclonal gammopathy  Immunofixation consistent with IgG Kappa  Multiple myeloma?  Status post bone marrow biopsy   Follow-up by heme-onc     #DM  HbA1c 5.7  Advised to maintain a good DM control to prevent progression of CKD   Maintain healthy diet (vegetables, fruits, whole grains, nonfat or low fat)  Weight loss  Physical activity (5 to 10 minutes to start the increase to 30 min a day)        # Sepsis  Possible cellulitis  Antibiotics as per primary team       The highlighted and/or bolded points in my assessment, plan, and disposition were discussed with the primary team and they agree with those points and the plan.  Previous records were personally reviewed by me to obtain a baseline creatinine.   The images (CXR) were personally reviewed by me in PACS      SUBJECTIVE:  Patient seen and examined at bedside. No chest pain, shortness of breath, nausea, vomiting, abdominal pain    OBJECTIVE:  Current Weight: Weight - Scale: (!) 186 kg (411 lb)  Vitals:    06/13/24 0347   BP: 130/61   Pulse: 64   Resp:    Temp:    SpO2: 92%     No intake or output data in the 24 hours ending 06/13/24 0656  Wt Readings from Last 3 Encounters:   06/13/24 (!) 186 kg (411 lb)   05/30/24 (!) 187 kg (413 lb)   05/22/24 (!) 189 kg (417 lb)     Temp Readings from Last 3 Encounters:   06/12/24 (!) 97.2 °F (36.2 °C) (Axillary)   05/30/24 (!) 97 °F (36.1 °C) (Core)   05/16/24 (!) 97 °F (36.1 °C) (Core)     BP Readings from Last 3 Encounters:   06/13/24 130/61   05/22/24 134/74   05/16/24 144/72     Pulse Readings from Last 3 Encounters:   06/13/24 64   05/22/24 76   05/16/24 59        General:  no acute distress at this time, obese  Skin:  No acute rash  Eyes:  No scleral icterus and noninjected  ENT:  mucous  membranes moist  Neck:  no carotid bruits  Chest:  Clear to auscultation percussion, good respiratory effort, no use of accessory respiratory muscles  CVS:  Regular rate and rhythm without rub   Abdomen:  soft and nontender   Extremities: no significant lower extremity edema  Neuro:  No gross focality  Psych:  Alert , cooperative   Dialysis access: Temporary HD line    Medications:    Current Facility-Administered Medications:     acetaminophen (TYLENOL) tablet 650 mg, 650 mg, Oral, Q6H SHAZIA, TOYA BriceñoNP, 650 mg at 06/13/24 0631    atorvastatin (LIPITOR) tablet 80 mg, 80 mg, Oral, Daily, Holly TOYA ShellNP, 80 mg at 06/12/24 1021    bumetanide (BUMEX) tablet 2 mg, 2 mg, Oral, Daily, Joselyn Reyes Bahamonde, MD, 2 mg at 06/12/24 1021    ceFAZolin (ANCEF) IVPB (premix in dextrose) 2,000 mg 50 mL, 2,000 mg, Intravenous, Q12H, Dennis Waterman MD, Last Rate: 100 mL/hr at 06/12/24 2106, 2,000 mg at 06/12/24 2106    Cholecalciferol (VITAMIN D3) tablet 5,000 Units, 5,000 Units, Oral, Daily, TOYA BriceñoNP, 5,000 Units at 06/12/24 1021    cyanocobalamin (VITAMIN B-12) tablet 1,000 mcg, 1,000 mcg, Oral, Daily, TOYA BriceñoNP, 1,000 mcg at 06/12/24 1021    docusate sodium (COLACE) capsule 100 mg, 100 mg, Oral, BID, Hollyjacklyn Sifuentes, CRNP, 100 mg at 06/12/24 1706    gabapentin (NEURONTIN) capsule 300 mg, 300 mg, Oral, BID, Holly Rachelle Sifuentes, CRNP, 300 mg at 06/12/24 1706    heparin (porcine) subcutaneous injection 5,000 Units, 5,000 Units, Subcutaneous, Q8H SHAZIA, oHlly Sifuentes, CRNP, 5,000 Units at 06/13/24 0631    insulin lispro (HumALOG/ADMELOG) 100 units/mL subcutaneous injection 1-6 Units, 1-6 Units, Subcutaneous, HS, Holly Sifuentes, PETRA, 1 Units at 06/06/24 2150    insulin lispro (HumALOG/ADMELOG) 100 units/mL subcutaneous injection 2-12 Units, 2-12 Units, Subcutaneous, TID AC, 2 Units at 06/08/24 1720 **AND**  Fingerstick Glucose (POCT), , , TID AC, PETRA Briceño    lidocaine (LIDODERM) 5 % patch 1 patch, 1 patch, Topical, Daily, PETRA Briceño    midodrine (PROAMATINE) tablet 5 mg, 5 mg, Oral, Before Dialysis, PETRA Briceño, 5 mg at 06/11/24 1317    nystatin (MYCOSTATIN) powder 1 Application, 1 Application, Topical, BID, PETRA Briceño, 1 Application at 06/12/24 1706    ondansetron (ZOFRAN) injection 4 mg, 4 mg, Intravenous, Q6H PRN, PETRA Briceño, 4 mg at 06/12/24 0840    oxyCODONE (ROXICODONE) split tablet 2.5 mg, 2.5 mg, Oral, Q4H PRN, PETRA Briceño, 2.5 mg at 06/13/24 0344    polyethylene glycol (MIRALAX) packet 17 g, 17 g, Oral, Daily PRN, PETRA Briceño    senna (SENOKOT) tablet 8.6 mg, 1 tablet, Oral, HS, PETRA Briceño, 8.6 mg at 06/10/24 2259    sodium hypochlorite (DAKIN'S HALF-STRENGTH) 0.25 percent topical solution 1 Application, 1 Application, Irrigation, Daily, PETRA Briceño, 1 Application at 06/12/24 1022    Laboratory Results:  Results from last 7 days   Lab Units 06/13/24  0640 06/12/24  0631 06/11/24  0607 06/10/24  1249 06/10/24  0607 06/10/24  0024 06/09/24  1954 06/09/24  1538 06/09/24  0450 06/08/24  0558 06/07/24  0846 06/07/24  0846   WBC Thousand/uL 11.51* 10.22* 11.26*  --  13.22*  --   --   --  13.08* 14.64*  --  14.53*   HEMOGLOBIN g/dL 7.0* 7.3* 6.9*  --  6.9*  --   --  7.2* 6.7* 8.0*  --  7.7*   HEMATOCRIT % 22.8* 23.0* 22.0*  --  22.3*  --   --   --  21.2* 26.0*  --  24.5*   PLATELETS Thousands/uL 284 298 317  --  267  --   --   --  281 309  --  316   SODIUM mmol/L  --  128* 133* 133* 135 133* 134* 134* 132* 132*  --  133*   POTASSIUM mmol/L  --  4.0 4.2 4.7 4.7 4.5 4.5 4.7 4.5 4.9  --  4.5   CHLORIDE mmol/L  --  101 98 99 100 99 98 96 97 98  --  100   CO2 mmol/L  --  26 25 25 26 23 25 25 24 18*  --  20*   BUN mg/dL  --  52* 46* 53*  50* 64* 66* 73* 71* 77*  --  91*   CREATININE mg/dL  --  7.10* 6.75* 7.62* 6.98* 8.31* 9.12* 10.42* 9.70* 9.55*  --  9.59*   CALCIUM mg/dL  --  7.9* 7.7* 7.8* 7.8* 7.7* 7.1* 7.1* 7.2* 7.2*  --  7.2*   MAGNESIUM mg/dL  --  2.1 2.0 2.0 2.0 1.9 1.8* 1.9 1.6* 1.7*   < >  --    PHOSPHORUS mg/dL  --  5.6* 6.4* 6.9* 6.3* 7.7* 8.5* 10.2*  --   --   --   --     < > = values in this interval not displayed.       XR chest portable ICU   Final Result by Daryl Mccarthy MD (06/11 0811)      1.  Right internal jugular dialysis catheter projects over the SVC. No pneumothorax.      2.  Cardiomegaly with vascular congestion.            Workstation performed: [id number]         CT lower extremity wo contrast left   Final Result by Jimbo Camacho MD (06/09 2045)      Extensive subcutaneous edema and skin thickening as described above. This may either reflect bland edema or extensive cellulitis. A well-defined rim-enhancing fluid collection not seen to suggest a discrete abscess. No soft tissue gas.      Osteoarthrosis as above. No osseous erosion.         Workstation performed: [id number]         CT chest abdomen pelvis wo contrast   Final Result by Allen Mcgowan MD (06/10 0804)   1. Small left greater than right basilar effusions with associated compressive atelectasis. Lungs otherwise clear.   2. Periaortic retroperitoneal, left greater than right iliac chain and inguinal lymph nodes identified, stable and nonspecific.   3. No acute findings.                     Workstation performed: [id number]         XR chest portable   Final Result by Danyell Ford MD (06/09 2240)      Mild pulmonary venous congestion.      Nothing to suggest pneumonia.            Workstation performed: [id number]         IR temporary dialysis catheter placement   Final Result by Radames Liu MD (06/07 1627)   1. Uncomplicated nontunneled/temporary dialysis catheter placement (14 Malay by 24 cm). Catheter tip terminates in the right  "atrium. The catheter is ready for immediate use.      2. Impaired hemostasis was discussed with Dr. Timmons of Nephrology. DDAVP will be administered to help mitigate rebleeding risk.      Workstation performed: [id number]         CT chest abdomen pelvis wo contrast   Final Result by Margoth Arango MD (06/03 8995)      No acute pulmonary pathology.      No acute abdominal or pelvic pathology within limitation of a noncontrast study. In particular, no hydronephrosis.      Left inguinal and left external iliac lymphadenopathy, increased from May 2022. This may be reactive to a process in the left lower extremity or in the partially imaged pannus.      Additional findings as above.                  Workstation performed: [id number]         NM lung ventilation / perfusion   Final Result by Daniel Ackerman MD (06/03 1412)      The probability for pulmonary embolus is low.      Workstation performed: [id number]         XR chest 1 view portable   ED Interpretation by Kit Antoine MD (06/03 4603)   Mild pulmonary edema right worse than left, no additional acute cardiopulmonary abnormality      Final Result by Miah Toledo MD (06/03 7097)      No acute cardiopulmonary disease.            Workstation performed: [id number]         IR biopsy kidney random    (Results Pending)    VAS VENOUS DUPLEX - LOWER LIMB BILATERAL    (Results Pending)   IR biopsy bone marrow    (Results Pending)       Portions of the record may have been created with voice recognition software. Occasional wrong word or \"sound a like\" substitutions may have occurred due to the inherent limitations of voice recognition software. Read the chart carefully and recognize, using context, where substitutions have occurred.    "

## 2024-06-13 NOTE — PROGRESS NOTES
Cardiology Progress Note  [facility]     Joaquin Frankel 44 y.o. male MRN: [id number]  : 1980  Unit/Bed#: [address] [place] [address] Encounter: [id number]        ASSESSMENT:  Admitted on this occasion for progressive RICHARDSON and exertional chest pain. Presenting EKG showed no acute diagnostic ischemic changes; hs troponin negative x3.   Acute renal failure.  Possibly due to diabetic nephropathy, recent use of NSAIDs, home ACE inhibitor, and cardiorenal syndrome with diastolic dysfunction.  S/p right IJ nontunneled temporary dialysis catheter 2024.  Echo 24: LVEF 58% with grade I LVDD and normal wall motion. RV pressure is moderately elevated at 58 mmHg.   Elevated D dimer.  VQ scan showed low probability of PE.  CT chest and abdomen without contrast 2024 report: Slight mosaic attenuation of the lungs.  No other acute pulmonary pathology.  See full report in Epic.  Monoclonal gammopathy. S/p bone marrow biopsy 24.   SIRS.  Cellulitis.  Hypertensive urgency on arrival.   CAD. NSTEMI in 2020 s/p PCI to RCA then. Hx of previous PCI to LCX in .   Morbid obesity with chronic lymphedema in the LLE.   Anemia of chronic disease.  Hx of L groin cellulitis and mass with skin grafting.  PMHx includes HLD, DM II, hx of tobacco use (reported stopping 3 years ago).     PLAN AND RECOMMENDATIONS:  Was initially due to undergo renal biopsy yesterday but due to morbid obesity, he was felt to be at elevated risk so a bone marrow biopsy was performed instead.   He is currently getting midodrine during dialysis sessions and albumin. Coreg and amlodipine are on hold as a result.   Trial Coreg tomorrow if pressures remain stable at half of his home dose.  Resume aspirin when deemed clinically appropriate from IR standpoint following bone marrow biopsy.  Noted IR request for PermCath placement.           Interval history: He is sitting up comfortably in bed.  Denies chest pain or shortness of breath  "at rest.        Meds/Allergies   current meds:   Current Facility-Administered Medications   Medication Dose Route Frequency    acetaminophen (TYLENOL) tablet 650 mg  650 mg Oral Q6H SHAZIA    atorvastatin (LIPITOR) tablet 80 mg  80 mg Oral Daily    bumetanide (BUMEX) tablet 2 mg  2 mg Oral Daily    ceFAZolin (ANCEF) IVPB (premix in dextrose) 2,000 mg 50 mL  2,000 mg Intravenous Q12H    Cholecalciferol (VITAMIN D3) tablet 5,000 Units  5,000 Units Oral Daily    cyanocobalamin (VITAMIN B-12) tablet 1,000 mcg  1,000 mcg Oral Daily    docusate sodium (COLACE) capsule 100 mg  100 mg Oral BID    gabapentin (NEURONTIN) capsule 300 mg  300 mg Oral BID    heparin (porcine) subcutaneous injection 5,000 Units  5,000 Units Subcutaneous Q8H SHAZIA    insulin lispro (HumALOG/ADMELOG) 100 units/mL subcutaneous injection 1-6 Units  1-6 Units Subcutaneous HS    insulin lispro (HumALOG/ADMELOG) 100 units/mL subcutaneous injection 2-12 Units  2-12 Units Subcutaneous TID AC    lidocaine (LIDODERM) 5 % patch 1 patch  1 patch Topical Daily    midodrine (PROAMATINE) tablet 5 mg  5 mg Oral Before Dialysis    nystatin (MYCOSTATIN) powder 1 Application  1 Application Topical BID    ondansetron (ZOFRAN) injection 4 mg  4 mg Intravenous Q6H PRN    oxyCODONE (ROXICODONE) split tablet 2.5 mg  2.5 mg Oral Q4H PRN    polyethylene glycol (MIRALAX) packet 17 g  17 g Oral Daily PRN    senna (SENOKOT) tablet 8.6 mg  1 tablet Oral HS    sodium hypochlorite (DAKIN'S HALF-STRENGTH) 0.25 percent topical solution 1 Application  1 Application Irrigation Daily     Allergies   Allergen Reactions    Keflex [Cephalexin] Facial Swelling and Lip Swelling    Amoxicillin Hives     childhood       Objective   Vitals: Blood pressure 128/62, pulse 70, temperature 97.5 °F (36.4 °C), temperature source Oral, resp. rate 19, height 5' 7\" (1.702 m), weight (!) 186 kg (411 lb), SpO2 94%.,       Intake/Output Summary (Last 24 hours) at 6/13/2024 1216  Last data filed at 6/13/2024 " 0901  Gross per 24 hour   Intake 120 ml   Output --   Net 120 ml       Physical Exam:  General: pleasant, comfortable, in no acute distress  Neurologic: speech is coherent, alert and oriented x3  Cardiovascular: regular S1 and S2, no murmurs  Pulmonary: lungs clear to auscultation bilaterally, no rales or wheezes  Abdomen: soft, nontender, obese  Extremities: LLE with massive lymphedema.  RLE 1+ pitting edema.       Lab Results:   Troponins:       Recent Results (from the past 24 hour(s))   Fingerstick Glucose (POCT)    Collection Time: 06/12/24  3:57 PM   Result Value Ref Range    POC Glucose 127 65 - 140 mg/dl   Fingerstick Glucose (POCT)    Collection Time: 06/12/24  8:51 PM   Result Value Ref Range    POC Glucose 123 65 - 140 mg/dl   Vancomycin, random    Collection Time: 06/13/24  6:40 AM   Result Value Ref Range    Vancomycin Rm 27.5 (H) 10.0 - 20.0 ug/mL   Basic metabolic panel    Collection Time: 06/13/24  6:40 AM   Result Value Ref Range    Sodium 131 (L) 135 - 147 mmol/L    Potassium 4.4 3.5 - 5.3 mmol/L    Chloride 96 96 - 108 mmol/L    CO2 24 21 - 32 mmol/L    ANION GAP 11 4 - 13 mmol/L    BUN 67 (H) 5 - 25 mg/dL    Creatinine 8.94 (H) 0.60 - 1.30 mg/dL    Glucose 91 65 - 140 mg/dL    Calcium 8.0 (L) 8.4 - 10.2 mg/dL    eGFR 6 ml/min/1.73sq m   CBC    Collection Time: 06/13/24  6:40 AM   Result Value Ref Range    WBC 11.51 (H) 4.31 - 10.16 Thousand/uL    RBC 2.53 (L) 3.88 - 5.62 Million/uL    Hemoglobin 7.0 (L) 12.0 - 17.0 g/dL    Hematocrit 22.8 (L) 36.5 - 49.3 %    MCV 90 82 - 98 fL    MCH 27.7 26.8 - 34.3 pg    MCHC 30.7 (L) 31.4 - 37.4 g/dL    RDW 14.5 11.6 - 15.1 %    Platelets 284 149 - 390 Thousands/uL    MPV 8.1 (L) 8.9 - 12.7 fL   Fingerstick Glucose (POCT)    Collection Time: 06/13/24  7:06 AM   Result Value Ref Range    POC Glucose 92 65 - 140 mg/dl   Fingerstick Glucose (POCT)    Collection Time: 06/13/24 10:52 AM   Result Value Ref Range    POC Glucose 107 65 - 140 mg/dl          Cardiac  testing:   Relevant testing reviewed - see above  Telemetry: sinus rhythm    Jose De Jesus Cardozo PA-C  6/13/2024  12:22 PM     This note was completed in part utilizing direct voice dictation software.  Grammatical errors, random word insertions, spelling mistakes, and incomplete sentences may be an occasional consequence of the system secondary to software limitations, ambient noise and hardware issues. Please read the chart carefully and recognize, using context, where substitutions have occurred.

## 2024-06-13 NOTE — CASE MANAGEMENT
Case Management Progress Note    Patient name Joaquin Frankel  Location [address] [address]/[address] [place] [address]-* MRN [id number]  : 1980 Date 2024       LOS (days): 10  Geometric Mean LOS (GMLOS) (days): 4.4  Days to GMLOS:-5.9        OBJECTIVE:      Current admission status: Inpatient  Preferred Pharmacy:   [id number] - [place], [place] - [address] [place] [address]  Phone: [phone] Fax: [phone]    [facility] - [place], [place] - [address], [facility] [address]  [address], [facility] [address]  [facility] [address]  Phone: [phone] Fax: [phone]    Primary Care Provider: Hany Mcfarland DO    Primary Insurance: BLUE CROSS  Secondary Insurance:     PROGRESS NOTE:    YEVGENIY spoke with patient at bedside to review plan for outpatient HD when medically cleared.  Patient had initially planned to return to work when discharged from the hospital but now does not believe that he will be able to return right away.  He is considering applying for disability benefits.  SW explained that an evening HD placement would require him to drive at least to [place] and patient feels this would be too far given his current health status.  Patient is now requesting more local daytime placement.      YEVGENIY spoke by phone with Vaishali in admissions at [facility].  YEVGENIY updated her on patient's request and a chair at [facility] is offered on a MWF 12:20 PM schedule.  Vaishali stated that patient is not eligible for the [facility] TCU program due to his CLOVIS status.  He is also not eligible for consideration for home therapy/PD for the same reason.  Clinic is aware that patient will require a bariatric chair.  YEVGENIY notified attending and nephrology and updated patient at bedside.  Vaishali will fax a schedule letter to the [facility]  [employer].

## 2024-06-13 NOTE — CONSULTS
[facility] Electronic Health Record Consult  IR has been consulted to evaluate the patient, determine the appropriate procedure, and whether or not a procedure can and should be performed regarding the care of Joaquin Frankel.    We were consulted by Nephrology concerning ESRD, and to possibly perform a Tunneled dialysis catheter placement if medically appropriate for the patient. The patient is aware that a specialty consultation is taking place, and agrees to the IR Consult on their behalf.     Assessment/Plan:   44 year old male with ESRD and non-tunneled catheter in place. Will plan for conversion to tunneled dialysis catheter placement tomorrow or early next week pending IR schedule.    Please make npo at midnight. Due to respiratory status and difficult airway sedation may not be a safe option for this patient, however make NPO at midnight and evaluation will be performed tomorrow prior to procedure.    I spent 10 minutes in medical consultative time evaluating the medical record and imaging of Joaquin Frankel.    Thank you for allowing Interventional Radiology to participate in the care of Joaquin Frankel. Please don't hesitate to call or TigerText us with any questions.     Geovani Rodarte MD

## 2024-06-13 NOTE — ASSESSMENT & PLAN NOTE
Suspect secondary to left lower extremity cellulitis, slowly improving  CT left lower extremity-extensive subcutaneous edema and skin thickening  CT chest abdomen pelvis-without any acute abnormality  Blood cultures negative, MRSA surveillance negative  Currently on Vanco plus Zosyn  Will de-escalate to cefazolin  Monitor clinically  Follow-up venous Doppler

## 2024-06-13 NOTE — CONSULTS
Vancomycin IV Pharmacy-to-Dose Consultation     Vancomycin has been discontinued.  Pharmacy will sign off.  Please contact or re-consult with questions.    Aide Velazco, Pharmacist

## 2024-06-14 ENCOUNTER — APPOINTMENT (OUTPATIENT)
Dept: NON INVASIVE DIAGNOSTICS | Facility: HOSPITAL | Age: 44
DRG: 673 | End: 2024-06-14
Payer: COMMERCIAL

## 2024-06-14 LAB
ANION GAP SERPL CALCULATED.3IONS-SCNC: 10 MMOL/L (ref 4–13)
BUN SERPL-MCNC: 48 MG/DL (ref 5–25)
CALCIUM SERPL-MCNC: 8 MG/DL (ref 8.4–10.2)
CHLORIDE SERPL-SCNC: 93 MMOL/L (ref 96–108)
CO2 SERPL-SCNC: 27 MMOL/L (ref 21–32)
CREAT SERPL-MCNC: 7.63 MG/DL (ref 0.6–1.3)
ERYTHROCYTE [DISTWIDTH] IN BLOOD BY AUTOMATED COUNT: 14.2 % (ref 11.6–15.1)
GFR SERPL CREATININE-BSD FRML MDRD: 7 ML/MIN/1.73SQ M
GLUCOSE SERPL-MCNC: 106 MG/DL (ref 65–140)
GLUCOSE SERPL-MCNC: 110 MG/DL (ref 65–140)
GLUCOSE SERPL-MCNC: 90 MG/DL (ref 65–140)
GLUCOSE SERPL-MCNC: 92 MG/DL (ref 65–140)
GLUCOSE SERPL-MCNC: 94 MG/DL (ref 65–140)
HCT VFR BLD AUTO: 24.1 % (ref 36.5–49.3)
HGB BLD-MCNC: 7.6 G/DL (ref 12–17)
MCH RBC QN AUTO: 28.3 PG (ref 26.8–34.3)
MCHC RBC AUTO-ENTMCNC: 31.5 G/DL (ref 31.4–37.4)
MCV RBC AUTO: 90 FL (ref 82–98)
PLATELET # BLD AUTO: 303 THOUSANDS/UL (ref 149–390)
PMV BLD AUTO: 8.2 FL (ref 8.9–12.7)
POTASSIUM SERPL-SCNC: 4.1 MMOL/L (ref 3.5–5.3)
RBC # BLD AUTO: 2.69 MILLION/UL (ref 3.88–5.62)
SODIUM SERPL-SCNC: 130 MMOL/L (ref 135–147)
WBC # BLD AUTO: 11.18 THOUSAND/UL (ref 4.31–10.16)

## 2024-06-14 PROCEDURE — 82948 REAGENT STRIP/BLOOD GLUCOSE: CPT

## 2024-06-14 PROCEDURE — 99152 MOD SED SAME PHYS/QHP 5/>YRS: CPT | Performed by: STUDENT IN AN ORGANIZED HEALTH CARE EDUCATION/TRAINING PROGRAM

## 2024-06-14 PROCEDURE — 99232 SBSQ HOSP IP/OBS MODERATE 35: CPT | Performed by: INTERNAL MEDICINE

## 2024-06-14 PROCEDURE — C1769 GUIDE WIRE: HCPCS

## 2024-06-14 PROCEDURE — C1750 CATH, HEMODIALYSIS,LONG-TERM: HCPCS

## 2024-06-14 PROCEDURE — 76937 US GUIDE VASCULAR ACCESS: CPT | Performed by: STUDENT IN AN ORGANIZED HEALTH CARE EDUCATION/TRAINING PROGRAM

## 2024-06-14 PROCEDURE — 99233 SBSQ HOSP IP/OBS HIGH 50: CPT | Performed by: STUDENT IN AN ORGANIZED HEALTH CARE EDUCATION/TRAINING PROGRAM

## 2024-06-14 PROCEDURE — 76937 US GUIDE VASCULAR ACCESS: CPT

## 2024-06-14 PROCEDURE — 02H633Z INSERTION OF INFUSION DEVICE INTO RIGHT ATRIUM, PERCUTANEOUS APPROACH: ICD-10-PCS | Performed by: STUDENT IN AN ORGANIZED HEALTH CARE EDUCATION/TRAINING PROGRAM

## 2024-06-14 PROCEDURE — 77001 FLUOROGUIDE FOR VEIN DEVICE: CPT | Performed by: STUDENT IN AN ORGANIZED HEALTH CARE EDUCATION/TRAINING PROGRAM

## 2024-06-14 PROCEDURE — 36558 INSERT TUNNELED CV CATH: CPT

## 2024-06-14 PROCEDURE — 02PYX3Z REMOVAL OF INFUSION DEVICE FROM GREAT VESSEL, EXTERNAL APPROACH: ICD-10-PCS | Performed by: STUDENT IN AN ORGANIZED HEALTH CARE EDUCATION/TRAINING PROGRAM

## 2024-06-14 PROCEDURE — 0JH63XZ INSERTION OF TUNNELED VASCULAR ACCESS DEVICE INTO CHEST SUBCUTANEOUS TISSUE AND FASCIA, PERCUTANEOUS APPROACH: ICD-10-PCS | Performed by: STUDENT IN AN ORGANIZED HEALTH CARE EDUCATION/TRAINING PROGRAM

## 2024-06-14 PROCEDURE — 99153 MOD SED SAME PHYS/QHP EA: CPT

## 2024-06-14 PROCEDURE — 85027 COMPLETE CBC AUTOMATED: CPT | Performed by: NURSE PRACTITIONER

## 2024-06-14 PROCEDURE — 77001 FLUOROGUIDE FOR VEIN DEVICE: CPT

## 2024-06-14 PROCEDURE — 36558 INSERT TUNNELED CV CATH: CPT | Performed by: STUDENT IN AN ORGANIZED HEALTH CARE EDUCATION/TRAINING PROGRAM

## 2024-06-14 PROCEDURE — 97110 THERAPEUTIC EXERCISES: CPT

## 2024-06-14 PROCEDURE — 99152 MOD SED SAME PHYS/QHP 5/>YRS: CPT

## 2024-06-14 PROCEDURE — 97530 THERAPEUTIC ACTIVITIES: CPT

## 2024-06-14 PROCEDURE — 80048 BASIC METABOLIC PNL TOTAL CA: CPT | Performed by: NURSE PRACTITIONER

## 2024-06-14 RX ORDER — MIDAZOLAM HYDROCHLORIDE 2 MG/2ML
INJECTION, SOLUTION INTRAMUSCULAR; INTRAVENOUS AS NEEDED
Status: COMPLETED | OUTPATIENT
Start: 2024-06-14 | End: 2024-06-14

## 2024-06-14 RX ORDER — CEFAZOLIN SODIUM 2 G/50ML
SOLUTION INTRAVENOUS
Status: COMPLETED | OUTPATIENT
Start: 2024-06-14 | End: 2024-06-14

## 2024-06-14 RX ORDER — HYDROMORPHONE HCL IN WATER/PF 6 MG/30 ML
0.2 PATIENT CONTROLLED ANALGESIA SYRINGE INTRAVENOUS EVERY 4 HOURS PRN
Status: DISCONTINUED | OUTPATIENT
Start: 2024-06-14 | End: 2024-06-17

## 2024-06-14 RX ORDER — OXYCODONE HYDROCHLORIDE 5 MG/1
5 TABLET ORAL EVERY 6 HOURS PRN
Status: DISCONTINUED | OUTPATIENT
Start: 2024-06-14 | End: 2024-06-14

## 2024-06-14 RX ORDER — METOPROLOL SUCCINATE 25 MG/1
25 TABLET, EXTENDED RELEASE ORAL DAILY
Status: DISCONTINUED | OUTPATIENT
Start: 2024-06-14 | End: 2024-06-26 | Stop reason: HOSPADM

## 2024-06-14 RX ORDER — ASPIRIN 81 MG/1
81 TABLET, CHEWABLE ORAL DAILY
Status: DISCONTINUED | OUTPATIENT
Start: 2024-06-14 | End: 2024-06-16

## 2024-06-14 RX ORDER — FENTANYL CITRATE 50 UG/ML
INJECTION, SOLUTION INTRAMUSCULAR; INTRAVENOUS AS NEEDED
Status: COMPLETED | OUTPATIENT
Start: 2024-06-14 | End: 2024-06-14

## 2024-06-14 RX ADMIN — ATORVASTATIN CALCIUM 80 MG: 80 TABLET, FILM COATED ORAL at 09:34

## 2024-06-14 RX ADMIN — MIDAZOLAM 0.5 MG: 1 INJECTION INTRAMUSCULAR; INTRAVENOUS at 14:56

## 2024-06-14 RX ADMIN — HEPARIN SODIUM 5000 UNITS: 5000 INJECTION, SOLUTION INTRAVENOUS; SUBCUTANEOUS at 16:20

## 2024-06-14 RX ADMIN — DOCUSATE SODIUM 100 MG: 100 CAPSULE, LIQUID FILLED ORAL at 17:11

## 2024-06-14 RX ADMIN — LIDOCAINE 5% 1 PATCH: 700 PATCH TOPICAL at 09:35

## 2024-06-14 RX ADMIN — CEFAZOLIN SODIUM 3000 MG: 2 SOLUTION INTRAVENOUS at 14:51

## 2024-06-14 RX ADMIN — METOPROLOL SUCCINATE 25 MG: 25 TABLET, EXTENDED RELEASE ORAL at 16:21

## 2024-06-14 RX ADMIN — GABAPENTIN 300 MG: 300 CAPSULE ORAL at 17:11

## 2024-06-14 RX ADMIN — ASPIRIN 81 MG CHEWABLE TABLET 81 MG: 81 TABLET CHEWABLE at 22:12

## 2024-06-14 RX ADMIN — ACETAMINOPHEN 650 MG: 325 TABLET ORAL at 06:22

## 2024-06-14 RX ADMIN — ACETAMINOPHEN 650 MG: 325 TABLET ORAL at 23:14

## 2024-06-14 RX ADMIN — GABAPENTIN 300 MG: 300 CAPSULE ORAL at 09:35

## 2024-06-14 RX ADMIN — HEPARIN SODIUM 5000 UNITS: 5000 INJECTION, SOLUTION INTRAVENOUS; SUBCUTANEOUS at 22:12

## 2024-06-14 RX ADMIN — SENNOSIDES 8.6 MG: 8.6 TABLET, FILM COATED ORAL at 22:12

## 2024-06-14 RX ADMIN — Medication 5000 UNITS: at 09:34

## 2024-06-14 RX ADMIN — Medication 2.5 MG: at 09:39

## 2024-06-14 RX ADMIN — CEFAZOLIN SODIUM 2000 MG: 2 SOLUTION INTRAVENOUS at 09:34

## 2024-06-14 RX ADMIN — CYANOCOBALAMIN TAB 500 MCG 1000 MCG: 500 TAB at 09:35

## 2024-06-14 RX ADMIN — Medication 2.5 MG: at 16:24

## 2024-06-14 RX ADMIN — Medication 20 ML: at 15:29

## 2024-06-14 RX ADMIN — HYDROMORPHONE HYDROCHLORIDE 0.2 MG: 0.2 INJECTION, SOLUTION INTRAMUSCULAR; INTRAVENOUS; SUBCUTANEOUS at 16:48

## 2024-06-14 RX ADMIN — FENTANYL CITRATE 50 MCG: 50 INJECTION, SOLUTION INTRAMUSCULAR; INTRAVENOUS at 14:56

## 2024-06-14 RX ADMIN — NYSTATIN 1 APPLICATION: 100000 POWDER TOPICAL at 17:11

## 2024-06-14 RX ADMIN — DOCUSATE SODIUM 100 MG: 100 CAPSULE, LIQUID FILLED ORAL at 09:35

## 2024-06-14 RX ADMIN — Medication 20 ML: at 15:05

## 2024-06-14 RX ADMIN — HYOSCYAMINE SULFATE 1 APPLICATION: 16 SOLUTION at 09:35

## 2024-06-14 RX ADMIN — Medication 2.5 MG: at 23:14

## 2024-06-14 RX ADMIN — ONDANSETRON 4 MG: 2 INJECTION INTRAMUSCULAR; INTRAVENOUS at 07:15

## 2024-06-14 RX ADMIN — NYSTATIN 1 APPLICATION: 100000 POWDER TOPICAL at 09:35

## 2024-06-14 RX ADMIN — ACETAMINOPHEN 650 MG: 325 TABLET ORAL at 12:38

## 2024-06-14 NOTE — PLAN OF CARE
Problem: Prexisting or High Potential for Compromised Skin Integrity  Goal: Skin integrity is maintained or improved  Description: INTERVENTIONS:  - Identify patients at risk for skin breakdown  - Assess and monitor skin integrity  - Assess and monitor nutrition and hydration status  - Monitor labs   - Assess for incontinence   - Turn and reposition patient  - Assist with mobility/ambulation  - Relieve pressure over bony prominences  - Avoid friction and shearing  - Provide appropriate hygiene as needed including keeping skin clean and dry  - Evaluate need for skin moisturizer/barrier cream  - Collaborate with interdisciplinary team   - Patient/family teaching  - Consider wound care consult   Outcome: Progressing     Problem: PAIN - ADULT  Goal: Verbalizes/displays adequate comfort level or baseline comfort level  Description: Interventions:  - Encourage patient to monitor pain and request assistance  - Assess pain using appropriate pain scale  - Administer analgesics based on type and severity of pain and evaluate response  - Implement non-pharmacological measures as appropriate and evaluate response  - Consider cultural and social influences on pain and pain management  - Notify physician/advanced practitioner if interventions unsuccessful or patient reports new pain  Outcome: Progressing     Problem: INFECTION - ADULT  Goal: Absence or prevention of progression during hospitalization  Description: INTERVENTIONS:  - Assess and monitor for signs and symptoms of infection  - Monitor lab/diagnostic results  - Monitor all insertion sites, i.e. indwelling lines, tubes, and drains  - [place] appropriate cooling/warming therapies per order  - Administer medications as ordered  - Instruct and encourage patient and family to use good hand hygiene technique  - Identify and instruct in appropriate isolation precautions for identified infection/condition  Outcome: Progressing     Problem: SAFETY ADULT  Goal: Patient will  remain free of falls  Description: INTERVENTIONS:  - Educate patient/family on patient safety including physical limitations  - Instruct patient to call for assistance with activity   - Consult OT/PT to assist with strengthening/mobility   - Keep Call bell within reach  - Keep bed low and locked with side rails adjusted as appropriate  - Keep care items and personal belongings within reach  - Initiate and maintain comfort rounds  - Make Fall Risk Sign visible to staff  - Apply yellow socks and bracelet for high fall risk patients  - Consider moving patient to room near nurses station  Outcome: Progressing  Goal: Maintain or return to baseline ADL function  Description: INTERVENTIONS:  -  Assess patient's ability to carry out ADLs; assess patient's baseline for ADL function and identify physical deficits which impact ability to perform ADLs (bathing, care of mouth/teeth, toileting, grooming, dressing, etc.)  - Assess/evaluate cause of self-care deficits   - Assess range of motion  - Assess patient's mobility; develop plan if impaired  - Assess patient's need for assistive devices and provide as appropriate  - Encourage maximum independence but intervene and supervise when necessary  - Involve family in performance of ADLs  - Assess for home care needs following discharge   - Consider OT consult to assist with ADL evaluation and planning for discharge  - Provide patient education as appropriate  Outcome: Progressing  Goal: Maintains/Returns to pre admission functional level  Description: INTERVENTIONS:  - Perform AM-PAC 6 Click Basic Mobility/ Daily Activity assessment daily.  - Set and communicate daily mobility goal to care team and patient/family/caregiver.   - Collaborate with rehabilitation services on mobility goals if consulted  - Out of bed for toileting  - Record patient progress and toleration of activity level   Outcome: Progressing     Problem: CARDIOVASCULAR - ADULT  Goal: Maintains optimal cardiac output  and hemodynamic stability  Description: INTERVENTIONS:  - Monitor I/O, vital signs and rhythm  - Monitor for S/S and trends of decreased cardiac output  - Administer and titrate ordered vasoactive medications to optimize hemodynamic stability  - Assess quality of pulses, skin color and temperature  - Assess for signs of decreased coronary artery perfusion  - Instruct patient to report change in severity of symptoms  Outcome: Progressing  Goal: Absence of cardiac dysrhythmias or at baseline rhythm  Description: INTERVENTIONS:  - Continuous cardiac monitoring, vital signs, obtain 12 lead EKG if ordered  - Administer antiarrhythmic and heart rate control medications as ordered  - Monitor electrolytes and administer replacement therapy as ordered  Outcome: Progressing     Problem: CARDIOVASCULAR - ADULT  Goal: Maintains optimal cardiac output and hemodynamic stability  Description: INTERVENTIONS:  - Monitor I/O, vital signs and rhythm  - Monitor for S/S and trends of decreased cardiac output  - Administer and titrate ordered vasoactive medications to optimize hemodynamic stability  - Assess quality of pulses, skin color and temperature  - Assess for signs of decreased coronary artery perfusion  - Instruct patient to report change in severity of symptoms  Outcome: Progressing  Goal: Absence of cardiac dysrhythmias or at baseline rhythm  Description: INTERVENTIONS:  - Continuous cardiac monitoring, vital signs, obtain 12 lead EKG if ordered  - Administer antiarrhythmic and heart rate control medications as ordered  - Monitor electrolytes and administer replacement therapy as ordered  Outcome: Progressing     Problem: RESPIRATORY - ADULT  Goal: Achieves optimal ventilation and oxygenation  Description: INTERVENTIONS:  - Assess for changes in respiratory status  - Assess for changes in mentation and behavior  - Position to facilitate oxygenation and minimize respiratory effort  - Oxygen administered by appropriate delivery  if ordered  - Initiate smoking cessation education as indicated  - Encourage broncho-pulmonary hygiene including cough, deep breathe, Incentive Spirometry  - Assess the need for suctioning and aspirate as needed  - Assess and instruct to report SOB or any respiratory difficulty  - Respiratory Therapy support as indicated  Outcome: Progressing     Problem: SKIN/TISSUE INTEGRITY - ADULT  Goal: Incision(s), wounds(s) or drain site(s) healing without S/S of infection  Description: INTERVENTIONS  - Assess and document dressing, incision, wound bed, drain sites and surrounding tissue  - Provide patient and family education  - Perform skin care/dressing changes  Outcome: Progressing  Goal: Skin Integrity remains intact(Skin Breakdown Prevention)  Description: Assess:  -Perform Juan assessment every shift  -Clean and moisturize skin every shift  -Inspect skin when repositioning, toileting, and assisting with ADLS  -Assess under medical devices such as masimo every shift  -Assess extremities for adequate circulation and sensation     Bed Management:  -Have minimal linens on bed & keep smooth, unwrinkled  -Change linens as needed when moist or perspiring  -Avoid sitting or lying in one position for more than 2 hours while in bed  -Keep HOB at 30 degrees     Toileting:  -Offer bedside commode  -Assess for incontinence every shift  -Use incontinent care products after each incontinent episode such as remedy    Activity:  -Mobilize patient 3 times a day  -Encourage activity and walks on unit  -Encourage or provide ROM exercises   -Turn and reposition patient every 2 Hours  -Use appropriate equipment to lift or move patient in bed  -Instruct/ Assist with weight shifting every 1 hour  when out of bed in chair  -Consider limitation of chair time 2 hour intervals    Skin Care:  -Avoid use of baby powder, tape, friction and shearing, hot water or constrictive clothing  -Relieve pressure over bony prominences using pillows and  wedges  -Do not massage red bony areas    Next Steps:  -Teach patient strategies to minimize risks such as weight shifting   -Consider consults to  interdisciplinary teams such as wound care  Outcome: Progressing     Problem: SKIN/TISSUE INTEGRITY - ADULT  Goal: Incision(s), wounds(s) or drain site(s) healing without S/S of infection  Description: INTERVENTIONS  - Assess and document dressing, incision, wound bed, drain sites and surrounding tissue  - Provide patient and family education  - Perform skin care/dressing changes  Outcome: Progressing       Toileting:  -Offer bedside commode  -Assess for incontinence every shift  -Use incontinent care products after each incontinent episode such as Remedy       Problem: NEUROSENSORY - ADULT  Goal: Achieves maximal functionality and self care  Description: INTERVENTIONS  - Monitor swallowing and airway patency with patient fatigue and changes in neurological status  - Encourage and assist patient to increase activity and self care.   - Encourage visually impaired, hearing impaired and aphasic patients to use assistive/communication devices  Outcome: Progressing     Problem: METABOLIC, FLUID AND ELECTROLYTES - ADULT  Goal: Electrolytes maintained within normal limits  Description: INTERVENTIONS:  - Monitor labs and assess patient for signs and symptoms of electrolyte imbalances  - Administer electrolyte replacement as ordered  - Monitor response to electrolyte replacements, including repeat lab results as appropriate  - Instruct patient on fluid and nutrition as appropriate  Outcome: Progressing  Goal: Fluid balance maintained  Description: INTERVENTIONS:  - Monitor labs   - Monitor I/O and WT  - Instruct patient on fluid and nutrition as appropriate  - Assess for signs & symptoms of volume excess or deficit  Outcome: Progressing  Goal: Glucose maintained within target range  Description: INTERVENTIONS:  - Monitor Blood Glucose as ordered  - Assess for signs and symptoms  of hyperglycemia and hypoglycemia  - Administer ordered medications to maintain glucose within target range  - Assess nutritional intake and initiate nutrition service referral as needed  Outcome: Progressing     Problem: MUSCULOSKELETAL - ADULT  Goal: Maintain or return mobility to safest level of function  Description: INTERVENTIONS:  - Assess patient's ability to carry out ADLs; assess patient's baseline for ADL function and identify physical deficits which impact ability to perform ADLs (bathing, care of mouth/teeth, toileting, grooming, dressing, etc.)  - Assess/evaluate cause of self-care deficits   - Assess range of motion  - Assess patient's mobility  - Assess patient's need for assistive devices and provide as appropriate  - Encourage maximum independence but intervene and supervise when necessary  - Involve family in performance of ADLs  - Assess for home care needs following discharge   - Consider OT consult to assist with ADL evaluation and planning for discharge  - Provide patient education as appropriate  Outcome: Progressing  Goal: Maintain proper alignment of affected body part  Description: INTERVENTIONS:  - Support, maintain and protect limb and body alignment  - Provide patient/ family with appropriate education  Outcome: Progressing

## 2024-06-14 NOTE — ASSESSMENT & PLAN NOTE
Lab Results   Component Value Date    HGBA1C 5.7 (H) 06/09/2024     Recent Labs     06/16/24  1607 06/16/24 2009 06/17/24  0729 06/17/24  1100   POCGLU 132 133 99 118     Blood Sugar Average: Last 72 hrs:  (P) [id number]    Amaryl held during his hospitalization.   Acceptable, continue to monitor on current regimen  Continue diabetic diet.

## 2024-06-14 NOTE — SEDATION DOCUMENTATION
Pt tolerated perm dialysis line placement. Vitals stable. Pt returning to IP room. Report given to receiving nurse

## 2024-06-14 NOTE — PLAN OF CARE
Problem: Prexisting or High Potential for Compromised Skin Integrity  Goal: Skin integrity is maintained or improved  Description: INTERVENTIONS:  - Identify patients at risk for skin breakdown  - Assess and monitor skin integrity  - Assess and monitor nutrition and hydration status  - Monitor labs   - Assess for incontinence   - Turn and reposition patient  - Assist with mobility/ambulation  - Relieve pressure over bony prominences  - Avoid friction and shearing  - Provide appropriate hygiene as needed including keeping skin clean and dry  - Evaluate need for skin moisturizer/barrier cream  - Collaborate with interdisciplinary team   - Patient/family teaching  - Consider wound care consult   Outcome: Progressing     Problem: PAIN - ADULT  Goal: Verbalizes/displays adequate comfort level or baseline comfort level  Description: Interventions:  - Encourage patient to monitor pain and request assistance  - Assess pain using appropriate pain scale  - Administer analgesics based on type and severity of pain and evaluate response  - Implement non-pharmacological measures as appropriate and evaluate response  - Consider cultural and social influences on pain and pain management  - Notify physician/advanced practitioner if interventions unsuccessful or patient reports new pain  Outcome: Progressing     Problem: INFECTION - ADULT  Goal: Absence or prevention of progression during hospitalization  Description: INTERVENTIONS:  - Assess and monitor for signs and symptoms of infection  - Monitor lab/diagnostic results  - Monitor all insertion sites, i.e. indwelling lines, tubes, and drains  - [place] appropriate cooling/warming therapies per order  - Administer medications as ordered  - Instruct and encourage patient and family to use good hand hygiene technique  - Identify and instruct in appropriate isolation precautions for identified infection/condition  Outcome: Progressing     Problem: SAFETY ADULT  Goal: Patient will  remain free of falls  Description: INTERVENTIONS:  - Educate patient/family on patient safety including physical limitations  - Instruct patient to call for assistance with activity   - Consult OT/PT to assist with strengthening/mobility   - Keep Call bell within reach  - Keep bed low and locked with side rails adjusted as appropriate  - Keep care items and personal belongings within reach  - Initiate and maintain comfort rounds  - Make Fall Risk Sign visible to staff  - Apply yellow socks and bracelet for high fall risk patients  - Consider moving patient to room near nurses station  Outcome: Progressing  Goal: Maintain or return to baseline ADL function  Description: INTERVENTIONS:  -  Assess patient's ability to carry out ADLs; assess patient's baseline for ADL function and identify physical deficits which impact ability to perform ADLs (bathing, care of mouth/teeth, toileting, grooming, dressing, etc.)  - Assess/evaluate cause of self-care deficits   - Assess range of motion  - Assess patient's mobility; develop plan if impaired  - Assess patient's need for assistive devices and provide as appropriate  - Encourage maximum independence but intervene and supervise when necessary  - Involve family in performance of ADLs  - Assess for home care needs following discharge   - Consider OT consult to assist with ADL evaluation and planning for discharge  - Provide patient education as appropriate  Outcome: Progressing  Goal: Maintains/Returns to pre admission functional level  Description: INTERVENTIONS:  - Perform AM-PAC 6 Click Basic Mobility/ Daily Activity assessment daily.  - Set and communicate daily mobility goal to care team and patient/family/caregiver.   - Collaborate with rehabilitation services on mobility goals if consulted  - Out of bed for toileting  - Record patient progress and toleration of activity level   Outcome: Progressing     Problem: CARDIOVASCULAR - ADULT  Goal: Maintains optimal cardiac output  and hemodynamic stability  Description: INTERVENTIONS:  - Monitor I/O, vital signs and rhythm  - Monitor for S/S and trends of decreased cardiac output  - Administer and titrate ordered vasoactive medications to optimize hemodynamic stability  - Assess quality of pulses, skin color and temperature  - Assess for signs of decreased coronary artery perfusion  - Instruct patient to report change in severity of symptoms  Outcome: Progressing  Goal: Absence of cardiac dysrhythmias or at baseline rhythm  Description: INTERVENTIONS:  - Continuous cardiac monitoring, vital signs, obtain 12 lead EKG if ordered  - Administer antiarrhythmic and heart rate control medications as ordered  - Monitor electrolytes and administer replacement therapy as ordered  Outcome: Progressing     Problem: RESPIRATORY - ADULT  Goal: Achieves optimal ventilation and oxygenation  Description: INTERVENTIONS:  - Assess for changes in respiratory status  - Assess for changes in mentation and behavior  - Position to facilitate oxygenation and minimize respiratory effort  - Oxygen administered by appropriate delivery if ordered  - Initiate smoking cessation education as indicated  - Encourage broncho-pulmonary hygiene including cough, deep breathe, Incentive Spirometry  - Assess the need for suctioning and aspirate as needed  - Assess and instruct to report SOB or any respiratory difficulty  - Respiratory Therapy support as indicated  Outcome: Progressing     Problem: SKIN/TISSUE INTEGRITY - ADULT  Goal: Incision(s), wounds(s) or drain site(s) healing without S/S of infection  Description: INTERVENTIONS  - Assess and document dressing, incision, wound bed, drain sites and surrounding tissue  - Provide patient and family education  - Perform skin care/dressing changes  Outcome: Progressing  Goal: Skin Integrity remains intact(Skin Breakdown Prevention)  Description: Assess:  -Assess extremities for adequate circulation and sensation     Bed  Management:  -Have minimal linens on bed & keep smooth, unwrinkled  -Change linens as needed when moist or perspiring  -Keep HOB at 45 degrees     Toileting:  -Offer bedside commode  Activity:    -Encourage activity and walks on unit  -Encourage or provide ROM exercises   -Use appropriate equipment to lift or move patient in bed  Skin Care:  -Avoid use of baby powder, tape, friction and shearing, hot water or constrictive clothing  -Do not massage red bony areas    Next   Outcome: Progressing     Problem: NEUROSENSORY - ADULT  Goal: Achieves maximal functionality and self care  Description: INTERVENTIONS  - Monitor swallowing and airway patency with patient fatigue and changes in neurological status  - Encourage and assist patient to increase activity and self care.   - Encourage visually impaired, hearing impaired and aphasic patients to use assistive/communication devices  Outcome: Progressing     Problem: METABOLIC, FLUID AND ELECTROLYTES - ADULT  Goal: Electrolytes maintained within normal limits  Description: INTERVENTIONS:  - Monitor labs and assess patient for signs and symptoms of electrolyte imbalances  - Administer electrolyte replacement as ordered  - Monitor response to electrolyte replacements, including repeat lab results as appropriate  - Instruct patient on fluid and nutrition as appropriate  Outcome: Progressing  Goal: Fluid balance maintained  Description: INTERVENTIONS:  - Monitor labs   - Monitor I/O and WT  - Instruct patient on fluid and nutrition as appropriate  - Assess for signs & symptoms of volume excess or deficit  Outcome: Progressing  Goal: Glucose maintained within target range  Description: INTERVENTIONS:  - Monitor Blood Glucose as ordered  - Assess for signs and symptoms of hyperglycemia and hypoglycemia  - Administer ordered medications to maintain glucose within target range  - Assess nutritional intake and initiate nutrition service referral as needed  Outcome: Progressing      Problem: MUSCULOSKELETAL - ADULT  Goal: Maintain or return mobility to safest level of function  Description: INTERVENTIONS:  - Assess patient's ability to carry out ADLs; assess patient's baseline for ADL function and identify physical deficits which impact ability to perform ADLs (bathing, care of mouth/teeth, toileting, grooming, dressing, etc.)  - Assess/evaluate cause of self-care deficits   - Assess range of motion  - Assess patient's mobility  - Assess patient's need for assistive devices and provide as appropriate  - Encourage maximum independence but intervene and supervise when necessary  - Involve family in performance of ADLs  - Assess for home care needs following discharge   - Consider OT consult to assist with ADL evaluation and planning for discharge  - Provide patient education as appropriate  Outcome: Progressing  Goal: Maintain proper alignment of affected body part  Description: INTERVENTIONS:  - Support, maintain and protect limb and body alignment  - Provide patient/ family with appropriate education  Outcome: Progressing

## 2024-06-14 NOTE — PROGRESS NOTES
[facility]  Progress Note  Name: Joaquin Frankel I  MRN: [id number]  Unit/Bed#: 4 [facility] 412-01 I Date of Admission: 6/3/2024   Date of Service: 6/14/2024 I Hospital Day: 11    Assessment & Plan   SIRS (systemic inflammatory response syndrome) ([facility])  Assessment & Plan  Suspect secondary to left lower extremity cellulitis, which appears to have improved  Patient was also noted to have draining abscess under the pannus on 6/13  CT left lower extremity-extensive subcutaneous edema and skin thickening  CT chest abdomen pelvis-without any acute abnormality  Blood cultures negative, MRSA surveillance negative  Status post I&D of the pannus by surgery on 6/13.  Cultures obtained  Follow-up cultures  Completing cefazolin  Monitor clinically  Follow-up venous Doppler    Acute respiratory failure with hypoxia (HCC)  Assessment & Plan  Noted to have worsening hypoxia during hospitalization requiring MFNC  Likely multifactorial including volume overload, suspected underlying obstructive sleep apnea and obesity hypoventilation syndrome.    No evidence of pulmonary emboli on diagnostic studies.  CT chest with evidence of bibasilar effusion with compressive atelectasis  Continue ultrafiltration as tolerated  Continue supplemental oxygen as needed  Incentive spirometry  Patient states he has an upcoming appointment with a sleep specialist and ultimately will undergo a sleep study as an outpatient.  He has been told he has overnight hypoxia.   Overnight pulse ox study qualifies patient for CPAP.      * CLOVIS (acute kidney injury) (HCC)  Assessment & Plan  POA with creatinine of 6.40.    Baseline creatinine 1.3-1.4  Noted to have worsening creatinine: Peaked at 10.42.  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  UA CR - 5280  No evidence of hydronephrosis or obstruction on CT  imaging.  Serum and urine immunofixation showing IgG kappa monoclonal band  Initially on CVV HD, now HD TTS.  Remains with significant volume overload  Continue HD as per nephrology, HD in a.m.  Volume removal as tolerated  Follow-up labs  Initially planned for renal biopsy but deferred due to high risk.  Status post bone marrow biopsy 6/12  Follow-up workup as per nephrology and hematology  IR consult for permacath placement today   outpatient dialysis arrangement, patient is scheduled for outpatient dialysis on 6/19    Wound of abdomen  Assessment & Plan  Seen by wound care, recommended general surgery consultation  Appreciate surgery input  Continue local wound care per wound care nurse  Follow-up in the outpatient wound center    Anemia of chronic disease  Assessment & Plan  Patient has anemia of chronic disease likely secondary to underlying nephropathy,?  Myeloma.      He did have some episodes of hematuria.  No further overt bleeding  Hemoglobin gradually down trended to 6.7 g/dL, status post 1 PRBC on 6/11  B12 310, add supplementation  Iron panel shows iron deficiency.  Status post IV venofer 200mg every other day x 2  Continue to monitor hemoglobin closely and transfuse for hemoglobin less than 7.0 or drastic drop in hemoglobin.    Chest pain  Assessment & Plan  POA.  Denies chest discomfort at present  VQ scan low suspicion for PE  Echo with normal EF and grade 1 diastolic dysfunction  Cardiology input appreciated  ASA on hold for biopsy     Hyponatremia  Assessment & Plan  Monitor    Diabetes (HCC)  Assessment & Plan  Lab Results   Component Value Date    HGBA1C 5.7 (H) 06/09/2024     Recent Labs     06/13/24  0706 06/13/24  1052 06/13/24  1544 06/13/24 2014   POCGLU 92 107 142* 146*     Blood Sugar Average: Last 72 hrs:  (P) [id number]    Amaryl held during his hospitalization.   Acceptable, continue to monitor on current regimen  Continue diabetic diet.      Hypertension  Assessment &  Plan  Holding amlodipine and carvedilol for low blood pressure  Requiring midodrine predialysis as needed  Blood pressure stable/improving at present  Discontinued Bumex as patient remains anuric  Started Toprol-XL  Monitor intake output, daily weight  Monitor blood pressure    Coronary artery disease  Assessment & Plan  Patient with a history of coronary artery disease with multivessel disease status post stents.  Cardiology following, input appreciated  Holding aspirin, will resume once cleared by IR  Started on Toprol-XL  Continue statin    Chronic acquired lymphedema  Assessment & Plan  Patient developed LLE lymphedema after left lower extremity surgical procedure several years ago.  Patient complaining of increasing pain in the left lower extremity likely secondary to volume overload.  Left lower extremity venous Doppler: Pending  Patient would benefit from ambulatory referral to lymphedema clinic.    Morbid obesity (HCC)  Assessment & Plan  With BMI of 64.42 which is good  Encourage lifestyle modification on discharge         Addendum, discussed with IR.,  Post permacath placement.  Patient was cleared to resume aspirin 6/14 p.m. by IR      VTE Pharmacologic Prophylaxis: VTE Score: 7 Moderate Risk (Score 3-4) - Pharmacological DVT Prophylaxis Ordered: heparin.    Mobility:   Basic Mobility Inpatient Raw Score: 17  JH-HLM Goal: 5: Stand one or more mins  JH-HLM Achieved: 3: Sit at edge of bed  JH-HLM Goal NOT achieved. Continue with multidisciplinary rounding and encourage appropriate mobility to improve upon JH-HLM goals.    Patient Centered Rounds: I performed bedside rounds with nursing staff today.   Discussions with Specialists or Other Care Team Provider: Nephrology    Education and Discussions with Family / Patient:  Discussed with the patient.     Total Time Spent on Date of Encounter in care of patient: 45 mins. This time was spent on one or more of the following: performing physical exam; counseling  and coordination of care; obtaining or reviewing history; documenting in the medical record; reviewing/ordering tests, medications or procedures; communicating with other healthcare professionals and discussing with patient's family/caregivers.    Current Length of Stay: 11 day(s)  Current Patient Status: Inpatient   Certification Statement: The patient will continue to require additional inpatient hospital stay due to acute kidney injury  Discharge Plan: Anticipate discharge in >72 hrs to discharge location to be determined pending rehab evaluations.    Code Status: Level 1 - Full Code    Subjective:     Reports difficulty in ambulation due to significant lower extremity edema  Denies any chest pain or shortness of breath    Denies any fever or chills  Tolerated HD well yesterday      Objective:     Vitals:   Temp (24hrs), Av.9 °F (36.6 °C), Min:97.4 °F (36.3 °C), Max:98.8 °F (37.1 °C)    Temp:  [97.4 °F (36.3 °C)-98.8 °F (37.1 °C)] 98 °F (36.7 °C)  HR:  [56-82] 60  Resp:  [18-20] 19  BP: (104-153)/(60-81) 128/81  SpO2:  [87 %-100 %] 92 %  Body mass index is 63.85 kg/m².     Input and Output Summary (last 24 hours):     Intake/Output Summary (Last 24 hours) at 2024 0830  Last data filed at 2024 1714  Gross per 24 hour   Intake 1040 ml   Output 2500 ml   Net -1460 ml         Physical Exam:   Physical Exam  Vitals and nursing note reviewed.   Constitutional:       General: He is not in acute distress.     Appearance: He is well-developed. He is obese.   HENT:      Head: Normocephalic and atraumatic.   Eyes:      Conjunctiva/sclera: Conjunctivae normal.   Neck:      Comments: Right IJ dialysis catheter  Cardiovascular:      Rate and Rhythm: Normal rate and regular rhythm.      Heart sounds: No murmur heard.  Pulmonary:      Effort: Pulmonary effort is normal. No respiratory distress.      Breath sounds: Decreased breath sounds present.   Abdominal:      Palpations: Abdomen is soft.      Tenderness: There  is no abdominal tenderness.      Comments: Abdominal wall edema, massive abdominal pannus, packing in place at I&D site with small amount of drainage surrounding erythema improved   Musculoskeletal:         General: No swelling.      Right lower leg: Edema present.      Left lower leg: Edema (Superimposed on chronic left lower extremity lymphedema) present.   Skin:     General: Skin is warm and dry.      Capillary Refill: Capillary refill takes less than 2 seconds.      Findings: Rash (Intertriginous) present. No erythema (Left lower extremity improved).   Neurological:      Mental Status: He is alert.   Psychiatric:         Mood and Affect: Mood normal.            Additional Data:     Labs:  Results from last 7 days   Lab Units 06/14/24  0751 06/13/24  0640 06/12/24  0631 06/11/24  0607   WBC Thousand/uL 11.18*   < > 10.22* 11.26*   HEMOGLOBIN g/dL 7.6*   < > 7.3* 6.9*   HEMATOCRIT % 24.1*   < > 23.0* 22.0*   PLATELETS Thousands/uL 303   < > 298 317   BANDS PCT %  --   --  1  --    SEGS PCT %  --   --   --  82*   LYMPHO PCT %  --   --  5 6*   MONO PCT %  --   --  4 8   EOS PCT %  --   --  5 2    < > = values in this interval not displayed.     Results from last 7 days   Lab Units 06/13/24  0640   SODIUM mmol/L 131*   POTASSIUM mmol/L 4.4   CHLORIDE mmol/L 96   CO2 mmol/L 24   BUN mg/dL 67*   CREATININE mg/dL 8.94*   ANION GAP mmol/L 11   CALCIUM mg/dL 8.0*   GLUCOSE RANDOM mg/dL 91     Results from last 7 days   Lab Units 06/12/24  0631   INR  1.13     Results from last 7 days   Lab Units 06/14/24  0725 06/13/24  2014 06/13/24  1544 06/13/24  1052 06/13/24  0706 06/12/24  2051 06/12/24  1557 06/12/24  1131 06/12/24  0756 06/11/24  2100 06/11/24  1533 06/11/24  1146   POC GLUCOSE mg/dl 110 146* 142* 107 92 123 127 98 107 117 113 130     Results from last 7 days   Lab Units 06/09/24  1538   HEMOGLOBIN A1C % 5.7*     Results from last 7 days   Lab Units 06/09/24  1538 06/09/24  0450 06/08/24  0558 06/07/24  1215    LACTIC ACID mmol/L 0.4*  --   --  0.3*   PROCALCITONIN ng/ml  --  10.26* 7.47* 2.85*       Lines/Drains:  Invasive Devices       Peripheral Intravenous Line  Duration             Peripheral IV 24 Left;Upper Arm 4 days    Peripheral IV 24 Right;Upper Arm 4 days              Hemodialysis Catheter  Duration             HD Temporary Double Catheter 3 days                      Telemetry:  Telemetry Orders (From admission, onward)               Temporary telemetry monitoring  (Hemodialysis Initiation Treatment)  Continuous x 24 Hours (Telem)           Question:  Indication  Answer:  During dialysis for arrythmia monitoring                     Telemetry Reviewed: Normal Sinus Rhythm  Indication for Continued Telemetry Use: No indication for continued use. Will discontinue.              Imaging: Reviewed radiology reports from this admission including: chest xray, chest CT scan, and abdominal/pelvic CT    Recent Cultures (last 7 days):   Results from last 7 days   Lab Units 24  1252 24  1230 24  1200   BLOOD CULTURE   --  No Growth After 5 Days. No Growth After 5 Days.   GRAM STAIN RESULT  3+ Polys  No organisms seen  --   --        Last 24 Hours Medication List:   Current Facility-Administered Medications   Medication Dose Route Frequency Provider Last Rate    acetaminophen  650 mg Oral Q6H [facility] PETRA Shell      atorvastatin  80 mg Oral Daily Holly PETRA Shell      bumetanide  2 mg Oral Daily Joselyn Reyes Bahamonde, MD      cefazolin  2,000 mg Intravenous Q12H Dennis Waterman MD 2,000 mg (24 2246)    Cholecalciferol  5,000 Units Oral Daily HollyPETRA Avalos      cyanocobalamin  1,000 mcg Oral Daily Holly PETRA Shell      docusate sodium  100 mg Oral BID Holly PETRA Shell      gabapentin  300 mg Oral BID [place] PETRA Shell      heparin (porcine)  5,000 Units Subcutaneous [facility]  Rachelle Sifuentes, PETRA      insulin lispro  1-6 Units Subcutaneous HS Holly Rachelle Sifuentes, CRNP      insulin lispro  2-12 Units Subcutaneous TID AC Holly Sifuentes, PETRA      lidocaine  1 patch Topical Daily Holly Rachelle Sifuentes, PETRA      midodrine  5 mg Oral Before Dialysis Hollyjacklyn Sifuentes, PETRA      nystatin  1 Application Topical BID Hollyjacklyn Sifuentes, PETRA      ondansetron  4 mg Intravenous Q6H PRN Hollyjacklyn Sifuentes, PETRA      oxyCODONE  2.5 mg Oral Q4H PRN Hollyjacklyn Sifuentes, PETRA      polyethylene glycol  17 g Oral Daily PRN Hollyjacklyn Sifuentes, PETRA      senna  1 tablet Oral HS Holly Rachelle Sifuentes, PETRA      sodium hypochlorite  1 Application Irrigation Daily Hollyjacklyn Sifuentes, PETRA          Today, Patient Was Seen By: Dennis Waterman MD    **Please Note: This note may have been constructed using a voice recognition system.**

## 2024-06-14 NOTE — PROGRESS NOTES
NEPHROLOGY PROGRESS NOTE   Joaquin Frankel 44 y.o. male MRN: [id number]  Unit/Bed#: [address] [place] [address] Encounter: [id number]  Reason for Consult: CLOVIS    ASSESSMENT AND PLAN:  45 yo man with PMH of morbid obesity, lower extremity lymphedema, diabetes, hypertension, CAD p/w shortness of breath for the last 2 weeks.  Nephrology is consulted for management of CLOVIS        PLAN:     # Anuric KDIGO CLOVIS stage 3 on CKD G3aA3, HD dependent TTS with no evidence of kidney recovery  Etiology: High suspicious of MGRS with possible multiple myeloma +/- diabetic glomerulopathy however rapid progression it is very uncommon  Baseline creatinine 1.3 to 1.4 mg/dL with previous episodes of CLOVIS  Peak creatinine: 10.42, before dialysis   current creatinine: 8.97, HD dependent  UA: Hematuria, proteinuria  UACR  5280  Renal imaging :   No hydronephrosis  High risk of kidney biopsy due to morbid obesity, fluid overload.  Patient underwent bone marrow biopsy on 6/12, awaiting results   Treatment:  Remains anuric with no evidence of kidney recovery   Plan to continue HD as per schedule, next dialysis tomorrow   Maintain MAP:  Over 65 mmHg if possible/avoid hypoperfusion:  Hold parameters on blood pressure medications  Avoid nephrotoxic agents such as NSAIDs, and IV contrast if possible. 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  Status post bone marrow biopsy   Follow-up by heme-onc     #DM  HbA1c 5.7  Advised to maintain a good DM control to prevent progression of CKD   Maintain healthy diet (vegetables, fruits, whole grains, nonfat or low fat)  Weight loss  Physical activity (5 to 10 minutes to start the increase to 30 min a day)        # Sepsis  Possible cellulitis  Antibiotics as per primary team    SUBJECTIVE:  Patient seen and examined at bedside. No chest pain, shortness of breath, nausea, vomiting.  Patient had dialysis yesterday, well-tolerated UF 2 L    Current Weight: Weight - Scale: (!) 186 kg (411 lb)  Vitals:    06/14/24 0400   BP:    Pulse: 82   Resp:    Temp:    SpO2: 93%       Intake/Output Summary (Last 24 hours) at 6/14/2024 0634  Last data filed at 6/13/2024 1714  Gross per 24 hour   Intake 1040 ml   Output 2500 ml   Net -1460 ml     Wt Readings from Last 3 Encounters:   06/13/24 (!) 186 kg (411 lb)   05/30/24 (!) 187 kg (413 lb)   05/22/24 (!) 189 kg (417 lb)     Temp Readings from Last 3 Encounters:   06/13/24 97.7 °F (36.5 °C)   05/30/24 (!) 97 °F (36.1 °C) (Core)   05/16/24 (!) 97 °F (36.1 °C) (Core)     BP Readings from Last 3 Encounters:   06/14/24 104/60   05/22/24 134/74   05/16/24 144/72     Pulse Readings from Last 3 Encounters:   06/14/24 82   05/22/24 76   05/16/24 59        General:  no acute distress at this time, obese  Skin:  No acute rash  Eyes:  No scleral icterus and noninjected  ENT:  mucous membranes moist  Neck:  no carotid bruits  Chest:  Clear to auscultation percussion, good respiratory effort, no use of accessory respiratory muscles  CVS:  Regular rate and rhythm without rub   Abdomen:  soft and nontender   Extremities:  no significant lower extremity edema L>R  Neuro:  No gross focality  Psych:  Alert , cooperative   Dialysis access: Right IJ temp dialysis line      Medications:    Current Facility-Administered Medications:     acetaminophen (TYLENOL) tablet 650 mg, 650 mg, Oral, Q6H SHAZIA, PETRA Briceño, 650 mg at 06/14/24 0622    atorvastatin (LIPITOR) tablet 80 mg, 80 mg, Oral, Daily, PETRA Briceño, 80 mg at 06/13/24 0815    bumetanide (BUMEX) tablet 2 mg, 2 mg, Oral, Daily, Joselyn Reyes Bahamonde, MD, 2 mg at 06/13/24 0815    ceFAZolin (ANCEF) IVPB (premix in dextrose) 2,000 mg 50 mL, 2,000 mg, Intravenous, Q12H, Dennis Waterman MD, Last Rate: 100 mL/hr at 06/13/24 2246, 2,000 mg at 06/13/24 2246    Cholecalciferol (VITAMIN D3) tablet 5,000 Units, 5,000 Units, Oral, Daily, PETRA Briceño, 5,000 Units at 06/13/24 0904    cyanocobalamin (VITAMIN B-12) tablet 1,000 mcg, 1,000 mcg, Oral, Daily, PETRA Briceño, 1,000 mcg at 06/13/24 0815    docusate sodium (COLACE) capsule 100 mg, 100 mg, Oral, BID, PETRA Briceño, 100 mg at 06/13/24 1741    gabapentin (NEURONTIN) capsule 300 mg, 300 mg, Oral, BID, PETRA Briceño, 300 mg at 06/13/24 1741    heparin (porcine) subcutaneous injection 5,000 Units, 5,000 Units, Subcutaneous, Q8H SHAZIA, PETRA Briceño, 5,000 Units at 06/13/24 2244    insulin lispro (HumALOG/ADMELOG) 100 units/mL subcutaneous injection 1-6 Units, 1-6 Units, Subcutaneous, HS, PETRA Briceño, 1 Units at 06/06/24 2150    insulin lispro (HumALOG/ADMELOG) 100 units/mL subcutaneous injection 2-12 Units, 2-12 Units, Subcutaneous, TID AC, 2 Units at 06/08/24 1720 **AND** Fingerstick Glucose (POCT), , , TID AC, PETRA Briceño    lidocaine (LIDODERM) 5 % patch 1 patch, 1 patch, Topical, Daily, PETRA Briceño, 1 patch at 06/13/24 0815    midodrine (PROAMATINE) tablet  5 mg, 5 mg, Oral, Before Dialysis, Holly Sifuentes, TOYANP, 5 mg at 06/11/24 1317    nystatin (MYCOSTATIN) powder 1 Application, 1 Application, Topical, BID, Holly Sifuentes, CRNP, 1 Application at 06/13/24 1741    ondansetron (ZOFRAN) injection 4 mg, 4 mg, Intravenous, Q6H PRN, Holly Sifuentes, PETRA, 4 mg at 06/12/24 0840    oxyCODONE (ROXICODONE) split tablet 2.5 mg, 2.5 mg, Oral, Q4H PRN, Holly Sifuentes, PETRA, 2.5 mg at 06/13/24 2244    polyethylene glycol (MIRALAX) packet 17 g, 17 g, Oral, Daily PRN, Holly Sifuentes, TOYANP    senna (SENOKOT) tablet 8.6 mg, 1 tablet, Oral, HS, PETRA Briceño, 8.6 mg at 06/10/24 2259    sodium hypochlorite (DAKIN'S HALF-STRENGTH) 0.25 percent topical solution 1 Application, 1 Application, Irrigation, Daily, TOYA BriceñoNP, 1 Application at 06/13/24 0915    Laboratory Results:  Results from last 7 days   Lab Units 06/13/24  0640 06/12/24  0631 06/11/24  0607 06/10/24  1249 06/10/24  0607 06/10/24  0024 06/09/24  1954 06/09/24  1538 06/09/24  0450 06/08/24  0558 06/07/24  0846 06/07/24  0846   WBC Thousand/uL 11.51* 10.22* 11.26*  --  13.22*  --   --   --  13.08* 14.64*  --  14.53*   HEMOGLOBIN g/dL 7.0* 7.3* 6.9*  --  6.9*  --   --  7.2* 6.7* 8.0*  --  7.7*   HEMATOCRIT % 22.8* 23.0* 22.0*  --  22.3*  --   --   --  21.2* 26.0*  --  24.5*   PLATELETS Thousands/uL 284 298 317  --  267  --   --   --  281 309  --  316   SODIUM mmol/L 131* 128* 133* 133* 135 133* 134* 134* 132* 132*  --  133*   POTASSIUM mmol/L 4.4 4.0 4.2 4.7 4.7 4.5 4.5 4.7 4.5 4.9  --  4.5   CHLORIDE mmol/L 96 101 98 99 100 99 98 96 97 98  --  100   CO2 mmol/L 24 26 25 25 26 23 25 25 24 18*  --  20*   BUN mg/dL 67* 52* 46* 53* 50* 64* 66* 73* 71* 77*  --  91*   CREATININE mg/dL 8.94* 7.10* 6.75* 7.62* 6.98* 8.31* 9.12* 10.42* 9.70* 9.55*  --  9.59*   CALCIUM mg/dL 8.0* 7.9* 7.7* 7.8* 7.8* 7.7* 7.1* 7.1* 7.2* 7.2*  --  7.2*   MAGNESIUM  mg/dL  --  2.1 2.0 2.0 2.0 1.9 1.8* 1.9 1.6* 1.7*   < >  --    PHOSPHORUS mg/dL  --  5.6* 6.4* 6.9* 6.3* 7.7* 8.5* 10.2*  --   --   --   --     < > = values in this interval not displayed.       IR biopsy bone marrow   Final Result by Geovani Rodarte MD (06/13 0953)   Bone marrow aspiration and biopsy.      Workstation performed: [id number]         XR chest portable ICU   Final Result by Daryl Mccarthy MD (06/11 0811)      1.  Right internal jugular dialysis catheter projects over the SVC. No pneumothorax.      2.  Cardiomegaly with vascular congestion.            Workstation performed: [id number]         CT lower extremity wo contrast left   Final Result by Jimbo Camacho MD (06/09 2045)      Extensive subcutaneous edema and skin thickening as described above. This may either reflect bland edema or extensive cellulitis. A well-defined rim-enhancing fluid collection not seen to suggest a discrete abscess. No soft tissue gas.      Osteoarthrosis as above. No osseous erosion.         Workstation performed: [id number]         CT chest abdomen pelvis wo contrast   Final Result by Allen Mcgowan MD (06/10 0804)   1. Small left greater than right basilar effusions with associated compressive atelectasis. Lungs otherwise clear.   2. Periaortic retroperitoneal, left greater than right iliac chain and inguinal lymph nodes identified, stable and nonspecific.   3. No acute findings.                     Workstation performed: [id number]         XR chest portable   Final Result by Danyell Ford MD (06/09 2240)      Mild pulmonary venous congestion.      Nothing to suggest pneumonia.            Workstation performed: [id number]         IR temporary dialysis catheter placement   Final Result by Radames Liu MD (06/07 1627)   1. Uncomplicated nontunneled/temporary dialysis catheter placement (14 Turkish by 24 cm). Catheter tip terminates in the right atrium. The catheter is ready for immediate use.  "     2. Impaired hemostasis was discussed with Dr. Timmons of Nephrology. DDAVP will be administered to help mitigate rebleeding risk.      Workstation performed: [id number]         CT chest abdomen pelvis wo contrast   Final Result by Margoth Arango MD (06/03 1555)      No acute pulmonary pathology.      No acute abdominal or pelvic pathology within limitation of a noncontrast study. In particular, no hydronephrosis.      Left inguinal and left external iliac lymphadenopathy, increased from May 2022. This may be reactive to a process in the left lower extremity or in the partially imaged pannus.      Additional findings as above.                  Workstation performed: [id number]         NM lung ventilation / perfusion   Final Result by Daniel Ackerman MD (06/03 1412)      The probability for pulmonary embolus is low.      Workstation performed: [id number]         XR chest 1 view portable   ED Interpretation by Kit Antoine MD (06/03 0753)   Mild pulmonary edema right worse than left, no additional acute cardiopulmonary abnormality      Final Result by Miah Toledo MD (06/03 0841)      No acute cardiopulmonary disease.            Workstation performed: [id number]         IR biopsy kidney random    (Results Pending)    VAS VENOUS DUPLEX - LOWER LIMB BILATERAL    (Results Pending)   IR tunneled central line placement    (Results Pending)       Portions of the record may have been created with voice recognition software. Occasional wrong word or \"sound a like\" substitutions may have occurred due to the inherent limitations of voice recognition software. Read the chart carefully and recognize, using context, where substitutions have occurred.    "

## 2024-06-14 NOTE — PROGRESS NOTES
Patient:    MRN:  [id number]    Willian Request ID:  [id number]    Level of care reserved:  Dialysis    Partner Reserved:  [facility] [place] [address] [phone]    Clinical needs requested:    Geography searched:  50 miles around [id number]    Start of Service:    Pickup/appointment time:    Request sent:  4:25pm EDT on 6/11/2024 by Cesia Keenan    Partner reserved:  9:09am EDT on 6/14/2024 by Frances Jacobs    Choice list shared:

## 2024-06-14 NOTE — BRIEF OP NOTE (RAD/CATH)
INTERVENTIONAL RADIOLOGY PROCEDURE NOTE    Date: 6/14/2024    Procedure:   Procedure Summary       Date:  Room / Location:     Anesthesia Start:  Anesthesia Stop:     Procedure:  Diagnosis:     Scheduled Providers:  Responsible Provider:     Anesthesia Type: Not recorded ASA Status: Not recorded          Preoperative diagnosis:   1. Dyspnea on exertion    2. Chest pain    3. Hypoxia    4. Renal failure    5. CLOVIS (acute kidney injury) ([facility])    6. Wound drainage    7. Anemia of chronic disease    8. IgG gammopathy    9. Cellulitis of left lower extremity    10. CKD (chronic kidney disease) stage 4, GFR 15-29 ml/min ([facility])         Postoperative diagnosis: Same.    Surgeon: Radames Liu MD     Assistant: None. No qualified resident was available.    Blood loss: Minimal    Specimens: None     Findings:   Temporary dialysis catheter placed by IR on 6/6 had been removed on 6/10, and a new temporary HD catheter had been placed by critical care on that date.  The access tract was not amenable to conversion to a tunneled catheter.  New temp HD catheter was removed and hemostasis obtained with gentle manual pressure.    A new right chest tunneled dialysis catheter was placed via the right internal jugular vein.  Tip terminates in the right atrium. New HD catheter is ready for immediate use.    Complications: None immediate.    Anesthesia: conscious sedation

## 2024-06-14 NOTE — ASSESSMENT & PLAN NOTE
Noted to have worsening hypoxia during hospitalization requiring MFNC  Likely multifactorial including volume overload, suspected underlying obstructive sleep apnea and obesity hypoventilation syndrome.  Currently saturating adequately on room air  No evidence of pulmonary emboli on diagnostic studies.  CT chest with evidence of bibasilar effusion with compressive atelectasis  Continue ultrafiltration as tolerated  Continue supplemental oxygen as needed  Incentive spirometry  Patient states he has an upcoming appointment with a sleep specialist and ultimately will undergo a sleep study as an outpatient.  He has been told he has overnight hypoxia.

## 2024-06-14 NOTE — ASSESSMENT & PLAN NOTE
CT left lower extremity-extensive subcutaneous edema and skin thickening  CT CAP-without any acute abnormality  BCx negative, MRSA surveillance negative  Noted to left lower extremity cellulitis superimposed on lymphedema, now improved  Source likely recurrent draining abscess under the pannus on 6/13.    Cx: Of beta-hemolytic Streptococcus group C  S/P I&D of the pannus by surgery on 6/13.    Continue IV cefazolin post HD, > 6/18  Negative venous Doppler

## 2024-06-14 NOTE — ASSESSMENT & PLAN NOTE
POA.  Denies chest discomfort at present  VQ scan low suspicion for PE  Echo with normal EF and grade 1 diastolic dysfunction  Cardiology input appreciated

## 2024-06-14 NOTE — ASSESSMENT & PLAN NOTE
Held amlodipine and carvedilol for low blood pressure initially  Requiring midodrine predialysis as needed  Blood pressure stable/improving at present  Discontinued Bumex as patient remains anuric  Started Toprol-XL  Procardia added  Monitor intake output, daily weight  Monitor blood pressure

## 2024-06-14 NOTE — ASSESSMENT & PLAN NOTE
POA with creatinine of 6.40 >11.18  BL CR 1.3-1.4  Initiated on HD on 6/7  UA: Hematuria, proteinuria.  UA CR - 5280  Serologies: ANCA titer was negative, anti-GBM was negative.  JOSE ROBERTO/anti-double-stranded DNA was negative.  Kappa/lambda ratio was within normal limits.  Serum and urine immunofixation showed IgG kappa monoclonal bands.  Complement C3 and C4 were normal.  Beta-2 microglobulin was elevated.  CT: No evidence of hydronephrosis  D/W recs nephrology:  Suspect MGRS  HD TTS schedule.  Plan for next dialysis today> OP HD  At this time no evidence of renal recovery.  Continue dialysis schedule.  Once established stable from renal standpoint for discharge. TTS schedule  Follow-up bone marrow biopsies/renal biopsy

## 2024-06-14 NOTE — PROGRESS NOTES
Cardiology Progress Note  [facility]     Joaquin Frankel 44 y.o. male MRN: [id number]  : 1980  Unit/Bed#: [address] [place] [address] Encounter: [id number]        ASSESSMENT:  Admitted on this occasion for progressive RICHARDSON and exertional chest pain. Presenting EKG showed no acute diagnostic ischemic changes; hs troponin negative x3.   Acute renal failure.  Possibly due to diabetic nephropathy, recent use of NSAIDs, home ACE inhibitor, and cardiorenal syndrome with diastolic dysfunction.  S/p right IJ nontunneled temporary dialysis catheter 2024.  Echo 24: LVEF 58% with grade I LVDD and normal wall motion. RV pressure is moderately elevated at 58 mmHg.   Elevated D dimer.  VQ scan showed low probability of PE.  CT chest and abdomen without contrast 2024 report: Slight mosaic attenuation of the lungs.  No other acute pulmonary pathology.  See full report in Epic.  Monoclonal gammopathy. S/p bone marrow biopsy 24.   SIRS.  Cellulitis.  Hypertensive urgency on arrival.   CAD. NSTEMI in 2020 s/p PCI to RCA then. Hx of previous PCI to LCX in .   Morbid obesity with chronic lymphedema in the LLE.   Anemia of chronic disease.  Hx of L groin cellulitis and mass with skin grafting.  PMHx includes HLD, DM II, hx of tobacco use (reported stopping 3 years ago).     PLAN AND RECOMMENDATIONS:  His blood pressures are stable. We will add low dose Toprol XL 25 mg daily due to hx of CAD in place of home Coreg as he may tolerate it better hemodynamically. Consider up-titration pending response.   His ASA was held for bone marrow biopsy. Please restart as soon as possible when deemed clinically appropriate from bone biopsy standpoint.   Volume being managed through dialysis. He had been on Bumex but this was discontinued as he is anuric. Placement of PermCath pending.   His home amlodipine is on hold. Home ACE discontinued due to renal failure.         Interval history: He is feeling okay.  "Still has lower extremity edema and feels he has some fluid retention still in his upper thighs.         Meds/Allergies   current meds:   Current Facility-Administered Medications   Medication Dose Route Frequency    acetaminophen (TYLENOL) tablet 650 mg  650 mg Oral Q6H SHAZIA    atorvastatin (LIPITOR) tablet 80 mg  80 mg Oral Daily    Cholecalciferol (VITAMIN D3) tablet 5,000 Units  5,000 Units Oral Daily    cyanocobalamin (VITAMIN B-12) tablet 1,000 mcg  1,000 mcg Oral Daily    docusate sodium (COLACE) capsule 100 mg  100 mg Oral BID    gabapentin (NEURONTIN) capsule 300 mg  300 mg Oral BID    heparin (porcine) subcutaneous injection 5,000 Units  5,000 Units Subcutaneous Q8H SHAZIA    insulin lispro (HumALOG/ADMELOG) 100 units/mL subcutaneous injection 1-6 Units  1-6 Units Subcutaneous HS    insulin lispro (HumALOG/ADMELOG) 100 units/mL subcutaneous injection 2-12 Units  2-12 Units Subcutaneous TID AC    lidocaine (LIDODERM) 5 % patch 1 patch  1 patch Topical Daily    midodrine (PROAMATINE) tablet 5 mg  5 mg Oral Before Dialysis    nystatin (MYCOSTATIN) powder 1 Application  1 Application Topical BID    ondansetron (ZOFRAN) injection 4 mg  4 mg Intravenous Q6H PRN    oxyCODONE (ROXICODONE) split tablet 2.5 mg  2.5 mg Oral Q4H PRN    polyethylene glycol (MIRALAX) packet 17 g  17 g Oral Daily PRN    senna (SENOKOT) tablet 8.6 mg  1 tablet Oral HS    sodium hypochlorite (DAKIN'S HALF-STRENGTH) 0.25 percent topical solution 1 Application  1 Application Irrigation Daily     Allergies   Allergen Reactions    Keflex [Cephalexin] Facial Swelling and Lip Swelling    Amoxicillin Hives     childhood       Objective   Vitals: Blood pressure 151/80, pulse 63, temperature 98 °F (36.7 °C), resp. rate 19, height 5' 7\" (1.702 m), weight (!) 185 kg (407 lb 11.2 oz), SpO2 97%.,       Intake/Output Summary (Last 24 hours) at 6/14/2024 1452  Last data filed at 6/13/2024 2246  Gross per 24 hour   Intake 550 ml   Output 2500 ml   Net -1950 " ml       Physical Exam:  General: pleasant, comfortable, in no acute distress  Neurologic: speech is coherent, alert and oriented x3  Cardiovascular: regular S1 and S2, no murmurs  Pulmonary: lungs clear to auscultation bilaterally, no rales or wheezes  Abdomen: soft, nontender  Extremities: massive lymphedema in the LLE. RLE with 1-2+ pitting edema.        Lab Results:   Troponins:       Recent Results (from the past 24 hour(s))   Fingerstick Glucose (POCT)    Collection Time: 06/13/24  3:44 PM   Result Value Ref Range    POC Glucose 142 (H) 65 - 140 mg/dl   Fingerstick Glucose (POCT)    Collection Time: 06/13/24  8:14 PM   Result Value Ref Range    POC Glucose 146 (H) 65 - 140 mg/dl   Fingerstick Glucose (POCT)    Collection Time: 06/14/24  7:25 AM   Result Value Ref Range    POC Glucose 110 65 - 140 mg/dl   Basic metabolic panel    Collection Time: 06/14/24  7:51 AM   Result Value Ref Range    Sodium 130 (L) 135 - 147 mmol/L    Potassium 4.1 3.5 - 5.3 mmol/L    Chloride 93 (L) 96 - 108 mmol/L    CO2 27 21 - 32 mmol/L    ANION GAP 10 4 - 13 mmol/L    BUN 48 (H) 5 - 25 mg/dL    Creatinine 7.63 (H) 0.60 - 1.30 mg/dL    Glucose 94 65 - 140 mg/dL    Calcium 8.0 (L) 8.4 - 10.2 mg/dL    eGFR 7 ml/min/1.73sq m   CBC    Collection Time: 06/14/24  7:51 AM   Result Value Ref Range    WBC 11.18 (H) 4.31 - 10.16 Thousand/uL    RBC 2.69 (L) 3.88 - 5.62 Million/uL    Hemoglobin 7.6 (L) 12.0 - 17.0 g/dL    Hematocrit 24.1 (L) 36.5 - 49.3 %    MCV 90 82 - 98 fL    MCH 28.3 26.8 - 34.3 pg    MCHC 31.5 31.4 - 37.4 g/dL    RDW 14.2 11.6 - 15.1 %    Platelets 303 149 - 390 Thousands/uL    MPV 8.2 (L) 8.9 - 12.7 fL   Fingerstick Glucose (POCT)    Collection Time: 06/14/24 11:36 AM   Result Value Ref Range    POC Glucose 90 65 - 140 mg/dl          Cardiac testing:   Relevant testing reviewed - see above      Jose De Jesus Cardozo PA-C  6/14/2024  2:56 PM     This note was completed in part utilizing direct voice dictation software.   Grammatical errors, random word insertions, spelling mistakes, and incomplete sentences may be an occasional consequence of the system secondary to software limitations, ambient noise and hardware issues. Please read the chart carefully and recognize, using context, where substitutions have occurred.

## 2024-06-14 NOTE — ASSESSMENT & PLAN NOTE
Patient has anemia of chronic disease likely secondary to underlying nephropathy,?  Myeloma.      He did have some episodes of hematuria.  No further episodes  No further overt bleeding  Hemoglobin gradually down trended to 6.7 g/dL, status post 1 PRBC on 6/11.  Subsequently stable  B12 310, continue supplementation  Iron panel shows iron deficiency.  Status post IV venofer 200mg every other day x 2  Continue to monitor hemoglobin closely and transfuse for hemoglobin less than 7.0 or drastic drop in hemoglobin.

## 2024-06-14 NOTE — ASSESSMENT & PLAN NOTE
Patient with a history of coronary artery disease with multivessel disease status post stents.  Cardiology following, input appreciated  Held aspirin initially for biopsy, subsequently resumed.  Discussed with nephrology, holding aspirin again in case patient requires kidney biopsy  Continue on Toprol-XL  Continue statin

## 2024-06-14 NOTE — ASSESSMENT & PLAN NOTE
Seen by wound care, recommended general surgery consultation  Appreciate surgery input  Continue local wound care per wound care nurse  Follow-up in the outpatient wound center

## 2024-06-14 NOTE — ASSESSMENT & PLAN NOTE
Patient developed LLE lymphedema after left lower extremity surgical procedure several years ago.  Patient complaining of increasing pain in the left lower extremity likely secondary to volume overload.  Left lower extremity venous Doppler: Negative  Prior referrals ambulatory referral to lymphedema clinic.

## 2024-06-14 NOTE — NURSING NOTE
Patient came back from IR after having HD catheter replacement. He was c/o a lot of pain to his neck I gave him oxycodone PRN and 10 minutes later he said he needed something stronger. Dr. Waterman notified dilaudid PRN ordered it with + effect.

## 2024-06-14 NOTE — PHYSICAL THERAPY NOTE
PT TREATMENT       06/14/24 1047   PT Last Visit   PT Visit Date 06/14/24   Note Type   Note Type Treatment   Pain Assessment   Pain Assessment Tool 0-10   Pain Score 6   Pain Location/Orientation Orientation: Left;Location: Leg   Pain Onset/Description Onset: Ongoing;Descriptor: Sore;Frequency: Constant/Continuous   Effect of Pain on Daily Activities limits rest/mobility   Patient's Stated Pain Goal No pain   Hospital Pain Intervention(s) Repositioned;Ambulation/increased activity   Multiple Pain Sites No   Restrictions/Precautions   Weight Bearing Precautions Per Order No   Other Precautions Bed Alarm;Chair Alarm;Fall Risk;Pain   General   Chart Reviewed Yes   Family/Caregiver Present No   Cognition   Overall Cognitive Status WFL   Arousal/Participation Cooperative   Orientation Level Oriented X4   Following Commands Follows all commands and directions without difficulty   Subjective   Subjective Pt agreeable to PT session this AM. Continues to report difficulty walking due to severe LLE swelling   Bed Mobility   Supine to Sit 5  Supervision   Additional items Assist x 1;Verbal cues;Increased time required;HOB elevated;Bedrails   Sit to Supine 5  Supervision   Additional items Assist x 1;Verbal cues;Increased time required;Bedrails   Transfers   Sit to Stand 5  Supervision   Additional items Assist x 1;Verbal cues;Increased time required;Bedrails   Stand to Sit 5  Supervision   Additional items Assist x 1;Verbal cues;Increased time required;Bedrails   Ambulation/Elevation   Gait pattern Wide DEJON;Short stride;Step to;Foward flexed;Excessively slow  (mild unsteadiness, limited by LLE pain/swelling)   Gait Assistance 4  Minimal assist   Additional items Assist x 1;Verbal cues   Assistive Device Rolling walker   Distance 50 feet with change of direction   Stair Management Assistance Not tested   Balance   Static Sitting Fair +   Dynamic Sitting Fair   Static Standing Fair   Dynamic Standing Fair -   Ambulatory Fair  -  (RW)   Activity Tolerance   Activity Tolerance Patient limited by fatigue;Patient limited by pain   Nurse Made Aware yes RN Arden   Exercises   Neuro re-ed Able to perform supine exercise including ankle pumps, quad/glute sets, heel slides x 5-10 reps bilat   Assessment   Prognosis Good   Problem List Decreased strength;Decreased range of motion;Decreased endurance;Impaired balance;Decreased mobility;Pain;Decreased skin integrity   Assessment Pt seen for PT session this AM. Able to progress ambulation x increased distance with Min A to supervision using RW. Pt continues to be limited by severe LLE swelling/pain/lymphedema + fatigue with mobility short distances. Unable to negotiate steps due to pain/weakness. Able to perform exercise as mentioned above with good response, mild fatigue reported. Pt reports dizziness at EOS - BP assessed 151/80 + repositioned in supine with HOB elevated.  The patient's AM-PAC Basic Mobility Inpatient Short Form Raw Score is 16. A Raw score of less than or equal to 16 suggests the patient may benefit from discharge to post-acute rehabilitation services. Please also refer to the recommendation of the Physical Therapist for safe discharge planning.     Goals   Patient Goals to decrease LLE pain/swelling in order to move better   Plan   Treatment/Interventions ADL retraining;Functional transfer training;LE strengthening/ROM;Therapeutic exercise;Endurance training;Bed mobility;Gait training;Spoke to nursing;OT   Progress Slow progress, decreased activity tolerance   Discharge Recommendation   Rehab Resource Intensity Level, PT II (Moderate Resource Intensity)  (TBD pending clinical course)   Equipment Recommended Walker   Walker Package Recommended HD Bariatric wheeled walker   AM-PAC Basic Mobility Inpatient   Turning in Flat Bed Without Bedrails 3   Lying on Back to Sitting on Edge of Flat Bed Without Bedrails 3   Moving Bed to Chair 3   Standing Up From Chair Using Arms 3   Walk  in Room 3   Climb 3-5 Stairs With Railing 1   Basic Mobility Inpatient Raw Score 16   Basic Mobility Standardized Score 38.32   [facility] Highest Level Of Mobility   -HL Goal 5: Stand one or more mins   -[facility] Achieved 7: Walk 25 feet or more   Education   Education Provided Mobility training;Assistive device   Patient Demonstrates verbal understanding   End of Consult   Patient Position at End of Consult Supine;All needs within reach;Bed/Chair alarm activated   Licensure   [place] License Number  Liliya Eunice [id number]

## 2024-06-14 NOTE — WOUND OSTOMY CARE
"Progress Note - Wound   Joaquin Frankel 44 y.o. male MRN: [id number]  Unit/Bed#: [address] [place] [address] Encounter: [id number]      Assessment:   This is a follow up visit for this 44 year old male patient admitted on 6/3/24 with acute kidney injury. He has a history of DM, HTN, CAD, hidradenitis suppurativa and morbid obesity. As per Primary RN, wound dressings done this am. Also, I&D of new abdominal wound done yesterday by El AVILA which was packed post procedure and wound care orders written at that time. As per Primary RN, no questions or problems with wound care.     Plan:   Skin care plans:  1-Apply skin barrier to left abdominal periwound then apply Dakin's soaked gauze x 5 minutes. Remove, flush with NSS & pat dry. Pack wound with 1/4\" Iodoform packing (wound is 1.6 cm deep) and cover with ABD pad and gentle paper tape. Change daily & prn soilage/dislodgement.  2-Follow orders from Surgical Team for new abdominal surgical wound.  3-Cleanse wound to left medial thigh with NSS & pat dry. Apply Melgisorb Ag (silver alginate) cut to size of wound. Cover with gauze or ABD and gentle paper tape. Change daily & prn soilage/dislodgement.  4-Cleanse fungal rash to right groin with foaming cleanser & pat dry. Apply light dusting of Nystatin powder 2x/day and gently remove excess to prevent caking.  5-Hydraguard to bilateral sacrum, buttock and heels BID and PRN  6-Float heels on 2 pillows to offload pressure so heels are not in contact with mattress or pillows.  7-Ehob pressure redistribution cushion in chair when out of bed. Avoid prolonged sitting.  8-Moisturize skin daily with skin nourishing cream.  9-Turn/reposition q2h or when medically stable for pressure re-distribution on skin.     Discussed plan of care/recommendations with Arden RN.     Wound care will follow along with patient throughout admission, please call or tiger text with questions and concerns.     Recommendations written as orders.  Myra Corea " MSN, RN, CWON

## 2024-06-15 ENCOUNTER — APPOINTMENT (INPATIENT)
Dept: DIALYSIS | Facility: HOSPITAL | Age: 44
DRG: 673 | End: 2024-06-15
Attending: STUDENT IN AN ORGANIZED HEALTH CARE EDUCATION/TRAINING PROGRAM
Payer: COMMERCIAL

## 2024-06-15 PROBLEM — R07.9 CHEST PAIN: Status: RESOLVED | Noted: 2021-12-02 | Resolved: 2024-06-15

## 2024-06-15 LAB
BACTERIA WND AEROBE CULT: ABNORMAL
BACTERIA WND AEROBE CULT: ABNORMAL
CRYOGLOB SER QL 1D COLD INC: NORMAL
GLUCOSE SERPL-MCNC: 118 MG/DL (ref 65–140)
GLUCOSE SERPL-MCNC: 126 MG/DL (ref 65–140)
GLUCOSE SERPL-MCNC: 130 MG/DL (ref 65–140)
GLUCOSE SERPL-MCNC: 90 MG/DL (ref 65–140)
GRAM STN SPEC: ABNORMAL
GRAM STN SPEC: ABNORMAL

## 2024-06-15 PROCEDURE — 99232 SBSQ HOSP IP/OBS MODERATE 35: CPT | Performed by: INTERNAL MEDICINE

## 2024-06-15 PROCEDURE — 82948 REAGENT STRIP/BLOOD GLUCOSE: CPT

## 2024-06-15 PROCEDURE — 99233 SBSQ HOSP IP/OBS HIGH 50: CPT | Performed by: STUDENT IN AN ORGANIZED HEALTH CARE EDUCATION/TRAINING PROGRAM

## 2024-06-15 RX ORDER — CEFAZOLIN SODIUM 2 G/50ML
2000 SOLUTION INTRAVENOUS 3 TIMES WEEKLY
Status: COMPLETED | OUTPATIENT
Start: 2024-06-15 | End: 2024-06-15

## 2024-06-15 RX ADMIN — HYDROMORPHONE HYDROCHLORIDE 0.2 MG: 0.2 INJECTION, SOLUTION INTRAMUSCULAR; INTRAVENOUS; SUBCUTANEOUS at 08:17

## 2024-06-15 RX ADMIN — DOCUSATE SODIUM 100 MG: 100 CAPSULE, LIQUID FILLED ORAL at 17:57

## 2024-06-15 RX ADMIN — HYDROMORPHONE HYDROCHLORIDE 0.2 MG: 0.2 INJECTION, SOLUTION INTRAMUSCULAR; INTRAVENOUS; SUBCUTANEOUS at 01:47

## 2024-06-15 RX ADMIN — HEPARIN SODIUM 5000 UNITS: 5000 INJECTION, SOLUTION INTRAVENOUS; SUBCUTANEOUS at 17:48

## 2024-06-15 RX ADMIN — GABAPENTIN 300 MG: 300 CAPSULE ORAL at 17:52

## 2024-06-15 RX ADMIN — CYANOCOBALAMIN TAB 500 MCG 1000 MCG: 500 TAB at 08:24

## 2024-06-15 RX ADMIN — ATORVASTATIN CALCIUM 80 MG: 80 TABLET, FILM COATED ORAL at 08:24

## 2024-06-15 RX ADMIN — DOCUSATE SODIUM 100 MG: 100 CAPSULE, LIQUID FILLED ORAL at 08:24

## 2024-06-15 RX ADMIN — ACETAMINOPHEN 650 MG: 325 TABLET ORAL at 12:14

## 2024-06-15 RX ADMIN — HYOSCYAMINE SULFATE 1 APPLICATION: 16 SOLUTION at 08:27

## 2024-06-15 RX ADMIN — HEPARIN SODIUM 5000 UNITS: 5000 INJECTION, SOLUTION INTRAVENOUS; SUBCUTANEOUS at 05:25

## 2024-06-15 RX ADMIN — CEFAZOLIN SODIUM 2000 MG: 2 SOLUTION INTRAVENOUS at 17:40

## 2024-06-15 RX ADMIN — Medication 2.5 MG: at 17:49

## 2024-06-15 RX ADMIN — ACETAMINOPHEN 650 MG: 325 TABLET ORAL at 17:49

## 2024-06-15 RX ADMIN — NYSTATIN 1 APPLICATION: 100000 POWDER TOPICAL at 17:55

## 2024-06-15 RX ADMIN — HEPARIN SODIUM 5000 UNITS: 5000 INJECTION, SOLUTION INTRAVENOUS; SUBCUTANEOUS at 21:04

## 2024-06-15 RX ADMIN — NYSTATIN 1 APPLICATION: 100000 POWDER TOPICAL at 08:26

## 2024-06-15 RX ADMIN — ASPIRIN 81 MG CHEWABLE TABLET 81 MG: 81 TABLET CHEWABLE at 08:25

## 2024-06-15 RX ADMIN — GABAPENTIN 300 MG: 300 CAPSULE ORAL at 08:24

## 2024-06-15 RX ADMIN — Medication 5000 UNITS: at 08:43

## 2024-06-15 RX ADMIN — HYDROMORPHONE HYDROCHLORIDE 0.2 MG: 0.2 INJECTION, SOLUTION INTRAMUSCULAR; INTRAVENOUS; SUBCUTANEOUS at 21:11

## 2024-06-15 RX ADMIN — LIDOCAINE 5% 1 PATCH: 700 PATCH TOPICAL at 08:24

## 2024-06-15 RX ADMIN — Medication 2.5 MG: at 05:29

## 2024-06-15 RX ADMIN — SENNOSIDES 8.6 MG: 8.6 TABLET, FILM COATED ORAL at 21:04

## 2024-06-15 RX ADMIN — ACETAMINOPHEN 650 MG: 325 TABLET ORAL at 05:25

## 2024-06-15 RX ADMIN — METOPROLOL SUCCINATE 25 MG: 25 TABLET, EXTENDED RELEASE ORAL at 08:24

## 2024-06-15 NOTE — PLAN OF CARE
Patient presents for a 3.5 hour HD session on a 3K2.5Ca bath for a serum potassium of 4.1 mmol/L drawn on 6/14/24 with a net UF goal of 3 L as tolerated per discussion with Dr. Reyes.    Post-Dialysis RN Treatment Note    Blood Pressure:  Pre 137/73 mm/Hg  Post 132/75 mmHg   EDW  TBD kg    Weight:  Pre 186.5 kg   Post 183.5 kg   Mode of weight measurement: Bed Scale   Volume Removed  3000 ml    Treatment duration 210 minutes    NS given  No    Treatment shortened? No   Medications given during Rx None Reported   Estimated Kt/V  Not Applicable   Access type: Permacath/TDC   Access Issues: Yes, describe: Lines reversed due to sluggish pull from arterial line     Report called to primary nurse   Yes, Arden Bourne RN     Problem: METABOLIC, FLUID AND ELECTROLYTES - ADULT  Goal: Electrolytes maintained within normal limits  Description: INTERVENTIONS:  - Monitor labs and assess patient for signs and symptoms of electrolyte imbalances  - Administer electrolyte replacement as ordered  - Monitor response to electrolyte replacements, including repeat lab results as appropriate  - Instruct patient on fluid and nutrition as appropriate  Outcome: Progressing  Goal: Fluid balance maintained  Description: INTERVENTIONS:  - Monitor labs   - Monitor I/O and WT  - Instruct patient on fluid and nutrition as appropriate  - Assess for signs & symptoms of volume excess or deficit  Outcome: Progressing

## 2024-06-15 NOTE — PROGRESS NOTES
[facility]  Progress Note  Name: Joaquin Frankel I  MRN: [id number]  Unit/Bed#: [address] [address]01 I Date of Admission: 6/3/2024   Date of Service: 6/15/2024 I Hospital Day: 12    Assessment & Plan   SIRS (systemic inflammatory response syndrome) ([facility])  Assessment & Plan  CT left lower extremity-extensive subcutaneous edema and skin thickening  CT chest abdomen pelvis-without any acute abnormality  Blood cultures negative, MRSA surveillance negative  Noted to left lower extremity cellulitis superimposed on lymphedema, now improved  Patient was also noted to have draining abscess under the pannus on 6/13.  Culture with growth of beta-hemolytic Streptococcus group C  Status post I&D of the pannus by surgery on 6/13.    Continue IV cefazolin  Monitor clinically  Follow-up venous Doppler    Acute respiratory failure with hypoxia (HCC)  Assessment & Plan  Noted to have worsening hypoxia during hospitalization requiring MFNC  Likely multifactorial including volume overload, suspected underlying obstructive sleep apnea and obesity hypoventilation syndrome.    No evidence of pulmonary emboli on diagnostic studies.  CT chest with evidence of bibasilar effusion with compressive atelectasis  Continue ultrafiltration as tolerated  Continue supplemental oxygen as needed  Incentive spirometry  Patient states he has an upcoming appointment with a sleep specialist and ultimately will undergo a sleep study as an outpatient.  He has been told he has overnight hypoxia.   Overnight pulse ox study qualifies patient for CPAP.      * CLOVIS (acute kidney injury) (HCC)  Assessment & Plan  POA with creatinine of 6.40.    Baseline creatinine 1.3-1.4  Noted to have worsening creatinine: Peaked at 10.42.  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  UA CR - 5280  No evidence of hydronephrosis  or obstruction on CT imaging.  Serum and urine immunofixation showing IgG kappa monoclonal band  Initially on CVV HD, now HD TTS.    Status post permacath placement 6/14  Remains with  volume overload  Continue HD as per nephrology, HD today  Volume removal as tolerated  Follow-up labs  Initially planned for renal biopsy but deferred due to high risk.  Status post bone marrow biopsy 6/12  Follow-up workup as per nephrology and hematology  IR consult for permacath placement today   outpatient dialysis arrangement, patient is scheduled for outpatient dialysis on 6/19    Wound of abdomen  Assessment & Plan  Seen by wound care, recommended general surgery consultation  Appreciate surgery input  Continue local wound care per wound care nurse  Follow-up in the outpatient wound center    Anemia of chronic disease  Assessment & Plan  Patient has anemia of chronic disease likely secondary to underlying nephropathy,?  Myeloma.      He did have some episodes of hematuria.  No further episodes  No further overt bleeding  Hemoglobin gradually down trended to 6.7 g/dL, status post 1 PRBC on 6/11.  Subsequently stable  B12 310, continue supplementation  Iron panel shows iron deficiency.  Status post IV venofer 200mg every other day x 2  Continue to monitor hemoglobin closely and transfuse for hemoglobin less than 7.0 or drastic drop in hemoglobin.    Hyponatremia  Assessment & Plan  Monitor    Diabetes (HCC)  Assessment & Plan  Lab Results   Component Value Date    HGBA1C 5.7 (H) 06/09/2024     Recent Labs     06/13/24  0706 06/13/24  1052 06/13/24  1544 06/13/24 2014   POCGLU 92 107 142* 146*     Blood Sugar Average: Last 72 hrs:  (P) 112.9520308294842417    Amaryl held during his hospitalization.   Acceptable, continue to monitor on current regimen  Continue diabetic diet.      Hypertension  Assessment & Plan  Holding amlodipine and carvedilol for low blood pressure  Requiring midodrine predialysis as needed  Blood pressure  stable/improving at present  Discontinued Bumex as patient remains anuric  Started Toprol-XL  Monitor intake output, daily weight  Monitor blood pressure    Coronary artery disease  Assessment & Plan  Patient with a history of coronary artery disease with multivessel disease status post stents.  Cardiology following, input appreciated  Held aspirin for biopsy, now resumed  Started on Toprol-XL  Continue statin    Chest pain-resolved as of 6/15/2024  Assessment & Plan  POA.  Denies chest discomfort at present  VQ scan low suspicion for PE  Echo with normal EF and grade 1 diastolic dysfunction  Cardiology input appreciated    Chronic acquired lymphedema  Assessment & Plan  Patient developed LLE lymphedema after left lower extremity surgical procedure several years ago.  Patient complaining of increasing pain in the left lower extremity likely secondary to volume overload.  Left lower extremity venous Doppler: Pending  Patient would benefit from ambulatory referral to lymphedema clinic.    Morbid obesity (HCC)  Assessment & Plan  With BMI of 64.42 which is good  Encourage lifestyle modification on discharge               VTE Pharmacologic Prophylaxis: VTE Score: 7 Moderate Risk (Score 3-4) - Pharmacological DVT Prophylaxis Ordered: heparin.    Mobility:   Basic Mobility Inpatient Raw Score: 16  JH-HLM Goal: 5: Stand one or more mins  JH-HLM Achieved: 3: Sit at edge of bed  JH-HLM Goal NOT achieved. Continue with multidisciplinary rounding and encourage appropriate mobility to improve upon JH-HLM goals.    Patient Centered Rounds: I performed bedside rounds with nursing staff today.   Discussions with Specialists or Other Care Team Provider: Yes    Education and Discussions with Family / Patient:  Discussed with the patient.  Discussed with patient's uncle at bedside    Total Time Spent on Date of Encounter in care of patient: 45 mins. This time was spent on one or more of the following: performing physical exam;  counseling and coordination of care; obtaining or reviewing history; documenting in the medical record; reviewing/ordering tests, medications or procedures; communicating with other healthcare professionals and discussing with patient's family/caregivers.    Current Length of Stay: 12 day(s)  Current Patient Status: Inpatient   Certification Statement: The patient will continue to require additional inpatient hospital stay due to acute kidney injury  Discharge Plan: Anticipate discharge in >72 hrs to discharge location to be determined pending rehab evaluations.    Code Status: Level 1 - Full Code    Subjective:     Sitting up comfortably in bed, family member visiting  Reports that he is overall feeling better  Discomfort reported having some discomfort at catheter site which is improving    Denies any shortness of breath  still with significant bilateral lower extremity edema    Objective:     Vitals:   Temp (24hrs), Av.4 °F (30.8 °C), Min:35.6 °F (2 °C), Max:98.1 °F (36.7 °C)    Temp:  [35.6 °F (2 °C)-98.1 °F (36.7 °C)] 97.5 °F (36.4 °C)  HR:  [57-70] 60  Resp:  [13-20] 18  BP: (128-170)/(67-95) 129/67  SpO2:  [87 %-100 %] 96 %  Body mass index is 63.98 kg/m².     Input and Output Summary (last 24 hours):     Intake/Output Summary (Last 24 hours) at 6/15/2024 0622  Last data filed at 2024 1701  Gross per 24 hour   Intake 240 ml   Output --   Net 240 ml         Physical Exam:   Physical Exam  Vitals and nursing note reviewed.   Constitutional:       General: He is not in acute distress.     Appearance: He is well-developed.   HENT:      Head: Normocephalic and atraumatic.   Neck:      Comments: Right IJ permacath  Cardiovascular:      Rate and Rhythm: Normal rate and regular rhythm.      Heart sounds: No murmur heard.  Pulmonary:      Effort: Pulmonary effort is normal. No respiratory distress.      Breath sounds: Decreased breath sounds present.   Abdominal:      Palpations: Abdomen is soft.       Tenderness: There is no abdominal tenderness.      Comments: Abdominal wall edema, massive abdominal pannus, packing in place at I&D site with small amount of drainage surrounding erythema improving   Musculoskeletal:         General: No swelling.      Right lower leg: Edema present.      Left lower leg: Edema present.      Comments: Bilateral lower extremity lymphedema, left more than right   Skin:     General: Skin is warm and dry.      Capillary Refill: Capillary refill takes less than 2 seconds.      Findings: Erythema (Improved) present.   Neurological:      Mental Status: He is alert.   Psychiatric:         Mood and Affect: Mood normal.            Additional Data:     Labs:  Results from last 7 days   Lab Units 06/14/24  0751 06/13/24  0640 06/12/24  0631 06/11/24  0607   WBC Thousand/uL 11.18*   < > 10.22* 11.26*   HEMOGLOBIN g/dL 7.6*   < > 7.3* 6.9*   HEMATOCRIT % 24.1*   < > 23.0* 22.0*   PLATELETS Thousands/uL 303   < > 298 317   BANDS PCT %  --   --  1  --    SEGS PCT %  --   --   --  82*   LYMPHO PCT %  --   --  5 6*   MONO PCT %  --   --  4 8   EOS PCT %  --   --  5 2    < > = values in this interval not displayed.     Results from last 7 days   Lab Units 06/14/24  0751   SODIUM mmol/L 130*   POTASSIUM mmol/L 4.1   CHLORIDE mmol/L 93*   CO2 mmol/L 27   BUN mg/dL 48*   CREATININE mg/dL 7.63*   ANION GAP mmol/L 10   CALCIUM mg/dL 8.0*   GLUCOSE RANDOM mg/dL 94     Results from last 7 days   Lab Units 06/12/24  0631   INR  1.13     Results from last 7 days   Lab Units 06/14/24  2110 06/14/24  1616 06/14/24  1136 06/14/24  0725 06/13/24  2014 06/13/24  1544 06/13/24  1052 06/13/24  0706 06/12/24  2051 06/12/24  1557 06/12/24  1131 06/12/24  0756   POC GLUCOSE mg/dl 106 92 90 110 146* 142* 107 92 123 127 98 107     Results from last 7 days   Lab Units 06/09/24  1538   HEMOGLOBIN A1C % 5.7*     Results from last 7 days   Lab Units 06/09/24  1538 06/09/24  0450   LACTIC ACID mmol/L 0.4*  --    PROCALCITONIN  ng/ml  --  10.26*       Lines/Drains:  Invasive Devices       Peripheral Intravenous Line  Duration             Peripheral IV 24 Left;Upper Arm 5 days    Peripheral IV 24 Right;Upper Arm 5 days              Hemodialysis Catheter  Duration             HD Permanent Double Catheter <1 day                      Telemetry:  Telemetry Orders (From admission, onward)               Temporary telemetry monitoring  (Hemodialysis Initiation Treatment)  Continuous x 24 Hours (Telem)           Question:  Indication  Answer:  During dialysis for arrythmia monitoring                     Telemetry Reviewed: Normal Sinus Rhythm  Indication for Continued Telemetry Use: No indication for continued use. Will discontinue.              Imaging: Reviewed radiology reports from this admission including: chest xray, chest CT scan, and abdominal/pelvic CT    Recent Cultures (last 7 days):   Results from last 7 days   Lab Units 24  1252   GRAM STAIN RESULT  3+ Polys  No organisms seen       Last 24 Hours Medication List:   Current Facility-Administered Medications   Medication Dose Route Frequency Provider Last Rate    acetaminophen  650 mg Oral Q6H [facility] PETRA Briceño      aspirin  81 mg Oral Daily Dennis Waterman MD      atorvastatin  80 mg Oral Daily HollyPETRA Avalos      Cholecalciferol  5,000 Units Oral Daily HollyPETRA Avalos      cyanocobalamin  1,000 mcg Oral Daily HollyPETRA Avalos      docusate sodium  100 mg Oral BID HollyPETRA Avalos      gabapentin  300 mg Oral BID HollyPETRA Avalos      heparin (porcine)  5,000 Units Subcutaneous Q8H [facility] PETRA Briceño      HYDROmorphone  0.2 mg Intravenous Q4H PRN Dennis Waterman MD      insulin lispro  1-6 Units Subcutaneous HS PETRA Briceño      insulin lispro  2-12 Units Subcutaneous TID AC PETRA Briceño      lidocaine  1 patch  Topical Daily PETRA Briceño      metoprolol succinate  25 mg Oral Daily Jose De Jesus Cardozo PA-C      midodrine  5 mg Oral Before Dialysis PETRA Briceño      nystatin  1 Application Topical BID PETRA Briceño      ondansetron  4 mg Intravenous Q6H PRN PETRA Briceño      oxyCODONE  2.5 mg Oral Q6H PRN Dennis Waterman MD      polyethylene glycol  17 g Oral Daily PRN PETRA Briceño      senna  1 tablet Oral HS PETRA Briceño      sodium hypochlorite  1 Application Irrigation Daily PETRA Briceño          Today, Patient Was Seen By: Dennis Waterman MD    **Please Note: This note may have been constructed using a voice recognition system.**

## 2024-06-15 NOTE — PROGRESS NOTES
"Progress Note - Cardiology   [facility]     Joaquin Frankel 44 y.o. male MRN: [id number]  : 1980  Unit/Bed#: [address] [place] [address] Encounter: [id number]    ASSESSMENT:  Acute renal failure  On dialysis via permacath  Renal biopsy was been deferred due to high risk     Elevated D-dimer with low probability VQ scan for PE     SIRS     Coronary artery disease,   S/p NSTEMI 2020, peak troponin was 0.72  s/p PCI of RCA on 2020,  History of PCI of LCX in      TTE:  EF 58%, moderate LVH, G1 DD,  Moderate biatrial enlargement, mild tricuspid regurgitation, RSVP 58 mmHg     Patient complains of chest pressure and dyspnea on exertion     Hypertension  Hypertensive urgency on admission     Hyperlipidemia:     Type 2 diabetes mellitus:     History of Left groin cellulitis/mass:  S/p excision of left groin mass with skin grafting     Chronic Tobacco Abuse  Quit Smoking about 3 years ago     Morbid obesity:      Chronic LLE lymphedema     Anemia of chronic disease  Monoclonal gammopathy  S/p bone marrow biopsy     Noncompliant with medications as outpatient           RECOMMENDATIONS:   Continue aspirin, atorvastatin, and Toprol.  Currently on midodrine predialysis  Renal biopsy deferred due to high risk  Management of ARF/dialysis as per renal service        Subjective / Objective:     Review of Systems  Patient sitting up by side of bed and talking to his son on the phone.  States that he feels better and more comfortable today.  Denies chest pain or any dyspnea at rest  Vitals: Blood pressure 130/65, pulse 55, temperature 97.5 °F (36.4 °C), resp. rate 18, height 5' 7\" (1.702 m), weight (!) 185 kg (408 lb 8 oz), SpO2 96%.  Vitals:    24 0600 06/15/24 0600   Weight: (!) 185 kg (407 lb 11.2 oz) (!) 185 kg (408 lb 8 oz)     Body mass index is 63.98 kg/m².  BP Readings from Last 3 Encounters:   06/15/24 130/65   24 134/74   24 144/72     Orthostatic Blood Pressures  "     Flowsheet Row Most Recent Value   Blood Pressure 130/65 filed at 06/15/2024 0730   Patient Position - Orthostatic VS Sitting  [Having dinner] filed at 06/14/2024 1737          I/O         06/13 0701  06/14 0700 06/14 0701  06/15 0700 06/15 0701  06/16 0700    P.O. 590 240     I.V. (mL/kg) 520 (2.8)      Total Intake(mL/kg) 1110 (6) 240 (1.3)     Other 2500      Total Output 2500      Net -1390 +240                  Invasive Devices       Peripheral Intravenous Line  Duration             Peripheral IV 06/09/24 Left;Upper Arm 5 days    Peripheral IV 06/09/24 Right;Upper Arm 5 days              Hemodialysis Catheter  Duration             HD Permanent Double Catheter <1 day                      Intake/Output Summary (Last 24 hours) at 6/15/2024 1003  Last data filed at 6/14/2024 1701  Gross per 24 hour   Intake 240 ml   Output --   Net 240 ml         Physical Exam:   Physical Exam    Neurologic:  Alert & oriented x 3,  no focal deficits noted   Constitutional: Morbidly obese  Eyes:  PERRL, conjunctiva normal   HENT:  Atraumatic, external ears normal, nose normal, .  NECK: Normal range of motion, no tenderness, neck is supple , No JVP  Respiratory:  Bilateral air entry, mostly clear to auscultation  Cardiovascular: Regular S1-S2 with a I/VI systolic murmur.  No pericardial rub or gallop audible  GI:  Soft, nondistended, audible bowel sounds, nontender, no hepatosplenomegaly appreciated  Musculoskeletal:  No tenderness, no deformities.    Extremities: Left lower extremity severe lymphedema, mild to moderate edema right lower extremity  Psychiatric:  Speech and behavior appropriate             Medications/ Allergies:     Current Facility-Administered Medications   Medication Dose Route Frequency Provider Last Rate    acetaminophen  650 mg Oral Q6H [facility] PETRA Briceño      aspirin  81 mg Oral Daily Dennis Waterman MD      atorvastatin  80 mg Oral Daily PETRA Briceño       Cholecalciferol  5,000 Units [facility]      cyanocobalamin  1,000 mcg Oral Daily [facility] CR[facility]      docusate sodium  100 mg Oral BID [facility]      gabapentin  300 [facility] BID [facility]      heparin (porcine)  5,000 Units Subcutaneous Q8H [facility] Bear, CRNP      HYDROmorphone  0.2 mg Intravenous Q4H PRN Dennis Waterman MD      insulin lispro  1-6 Units Subcutaneous HS [facility], CRNP      insulin lispro  2-12 Units Subcutaneous TID AC [facility] Bear, CRNP      lidocaine  1 patch Topical Daily [facility]      metoprolol succinate  25 mg Oral Daily Jose De Jesus Cardozo PA-C      midodrine  5 mg Oral Before Dialysis [facility], [facility]      nystatin  1 Application Topical BID [facility], TOYANP      ondansetron  4 mg Intravenous Q6H PRN [facility], TOYANP      oxyCODONE  2.5 mg Oral Q6H PRN Dennis Waterman MD      polyethylene glycol  17 g Oral Daily PRN [facility], PETRA      senna  1 tablet Oral HS [facility], PETRA      sodium hypochlorite  1 Application Irrigation Daily [facility], TOYANP       HYDROmorphone, 0.2 mg, Q4H PRN  midodrine, 5 mg, Before Dialysis  ondansetron, 4 mg, Q6H PRN  oxyCODONE, 2.5 mg, Q6H PRN  polyethylene glycol, 17 g, Daily PRN      Allergies   Allergen Reactions    Keflex [Cephalexin] Facial Swelling and Lip Swelling    Amoxicillin Hives     childhood           Labs:   Troponins:      CBC with diff:  Results from last 7 days   Lab Units 06/14/24  0751 06/13/24  0640 06/12/24  0631 06/11/24  0607 06/10/24  0607 06/09/24  1538 06/09/24  0450   WBC Thousand/uL 11.18* 11.51* 10.22* 11.26* 13.22*  --  13.08*   HEMOGLOBIN g/dL 7.6* 7.0* 7.3* 6.9* 6.9* 7.2* 6.7*   HEMATOCRIT % 24.1* 22.8* 23.0* 22.0* 22.3*  --  21.2*   MCV fL 90 90 89 89 91  --  89   PLATELETS  "Thousands/uL 303 284 298 317 267  --  281   RBC Million/uL 2.69* 2.53* 2.58* 2.46* 2.46*  --  2.38*   MCH pg 28.3 27.7 28.3 28.0 28.0  --  28.2   MCHC g/dL 31.5 30.7* 31.7 31.4 30.9*  --  31.6   RDW % 14.2 14.5 14.6 14.8 15.0  --  15.4*   MPV fL 8.2* 8.1* 8.4* 8.6* 8.8*  --  8.8*   NRBC AUTO /100 WBCs  --   --   --  0 0  --   --        CMP:  Results from last 7 days   Lab Units 06/14/24  0751 06/13/24  0640 06/12/24  0631 06/11/24  0607 06/10/24  1249 06/10/24  0607 06/10/24  0024   SODIUM mmol/L 130* 131* 128* 133* 133* 135 133*   POTASSIUM mmol/L 4.1 4.4 4.0 4.2 4.7 4.7 4.5   CHLORIDE mmol/L 93* 96 101 98 99 100 99   CO2 mmol/L 27 24 26 25 25 26 23   ANION GAP mmol/L 10 11 1* 10 9 9 11   BUN mg/dL 48* 67* 52* 46* 53* 50* 64*   CREATININE mg/dL 7.63* 8.94* 7.10* 6.75* 7.62* 6.98* 8.31*   CALCIUM mg/dL 8.0* 8.0* 7.9* 7.7* 7.8* 7.8* 7.7*   EGFR ml/min/1.73sq m 7 6 8 9 7 8 7       Magnesium:  Results from last 7 days   Lab Units 06/12/24  0631 06/11/24  0607 06/10/24  1249 06/10/24  0607 06/10/24  0024 06/09/24  1954 06/09/24  1538   MAGNESIUM mg/dL 2.1 2.0 2.0 2.0 1.9 1.8* 1.9     Coags:  Results from last 7 days   Lab Units 06/12/24  0631   INR  1.13     TSH:    No components found for: \"TSH3\"  Lipid Profile:    Hgb A1c:  Results from last 7 days   Lab Units 06/09/24  1538   HEMOGLOBIN A1C % 5.7*     NT-proBNP: No results for input(s): \"NTBNP\" in the last 72 hours.     Imaging & Testing   I have personally reviewed pertinent reports.    IR tunneled central line placement    Result Date: 6/14/2024  Narrative: PROCEDURE: Nontunneled central venous catheter removal. Tunneled dialysis catheter placement. Procedural Personnel Attending physician(s): Radames Liu MD Pre-procedure diagnosis: Anuric CLOVIS on CKD on HD without evidence of renal recovery Post-procedure diagnosis: Same Clinical History: 44-year-old man with history of acute kidney injury on pre-existing chronic kidney disease, who had a temporary " dialysis catheter placed by me on 6/6/2024. On 6/10/2024, a small tear/hole was noted in the catheter I had placed, and that catheter was removed. The critical care service placed a new temporary dialysis catheter on that date. He has had no evidence of renal recovery and requires transition to a tunneled dialysis catheter. PROCEDURE SUMMARY: - Removal of nontunneled central venous catheter - Right internal jugular vein access under real-time ultrasound guidance - Tunneled dialysis catheter placement PROCEDURE DETAILS: Pre-procedure Consent: Informed consent for the procedure including risks, benefits and alternatives was obtained and time-out was performed prior to the procedure. Preparation: The site was prepared and draped using maximal sterile barrier technique including cutaneous antisepsis. Anesthesia/sedation Level of anesthesia/sedation: Moderate sedation (conscious sedation) Anesthesia/sedation administered by: Independent trained observer under attending supervision with continuous monitoring of the patient's level of consciousness and physiologic status Total intra-service sedation time: 1 hour Technique and Findings: Initial fluoroscopic image demonstrated a temporary dialysis catheter in place, with tip terminating in the mid superior vena cava. The access trajectory was not amenable to conversion to a tunneled catheter. The temporary dialysis catheter was removed and hemostasis obtained with gentle manual pressure. Ultrasound demonstrated a widely patent right internal jugular vein, appropriate for access. Local anesthetic was administered. A dermatotomy was made. Under real-time ultrasound guidance, a 21-gauge micropuncture needle was advanced into the right internal jugular vein from a lateral approach. A permanent image was saved. A microwire was advanced and the needle exchanged for the micropuncture transitional dilator. This access was secured with a flow switch. An appropriate length tunneled  dialysis catheter was selected. Local anesthetic was administered along the planned tunnel tract. A dermatotomy was made on the chest. The catheter was tunneled from the chest incision to the right neck incision. Attention was returned to the vascular access. The microwire was exchanged for a 0.035 inch guidewire. The access site was serially dilated and a peel-away sheath was placed. The catheter was inserted via the sheath, which was then peeled away. Completion image demonstrated that the tip terminated in the right atrium. The catheter was checked and both lumens provided brisk flow for dialysis. The catheter was secured with nonabsorbable suture, both lumens flushed and capped per protocol, and a sterile dressing was applied. Contrast None Radiation Dose Fluoroscopy time: 1.1 min Reference air kerma: 20 mGy Additional Details Specimens removed: None Estimated blood loss (mL): Less than 10 Complications: No immediate complications.     Impression: Successful placement of 15.5 [place]ian by 32 cm right chest/right internal jugular vein tunneled hemodialysis catheter. The tip terminates in the right atrium. The catheter is ready for immediate use. Workstation performed: [id number]     US bedside procedure    Result Date: 6/13/2024  Narrative: [id number]    IR biopsy bone marrow    Result Date: 6/13/2024  Narrative: PROCEDURE: CT-guided bone marrow biopsy and bone marrow aspiration STAFF: Geovani Rodarte M.D. DOSE LENGTH PRODUCT: 1292.30 mGy-cm. This examination, like all CT scans performed in the [facility], was performed utilizing techniques to minimize radiation dose exposure, including the use of iterative reconstruction and automated exposure control. NUMBER OF IMAGES: Multiple. COMPLICATIONS: None. MEDICATIONS: 1% lidocaine Moderate sedation was monitored by radiology nursing staff.  Monitoring of conscious sedation totaled 0 minutes. INDICATION: Rule out multiple  myeloma PROCEDURE: Using intermittent CT guidance, the 6fusion system was used to perform bone marrow aspirate via the left posterior superior iliac spine. Approximately 6 mL of marrow was removed and sent to pathology. Next, bone marrow biopsy was performed. The biopsy specimen was placed in formalin and sent to pathology. The needle was then removed and hemostasis was achieved using manual compression.     Impression: Bone marrow aspiration and biopsy. Workstation performed: [id number]     XR chest portable ICU    Result Date: 6/11/2024  Narrative: XR CHEST PORTABLE ICU INDICATION: status post right IJ temporary hemodialysis catheter placement. COMPARISON: 6/8/2024 FINDINGS: Right internal jugular dialysis catheter projects over the SVC. Clear lungs. There is vascular congestion. No pneumothorax or pleural effusion. Enlarged cardiac silhouette, unchanged. Bones are unremarkable for age. Normal upper abdomen.     Impression: 1.  Right internal jugular dialysis catheter projects over the SVC. No pneumothorax. 2.  Cardiomegaly with vascular congestion. Workstation performed: [id number]     CT chest abdomen pelvis wo contrast    Result Date: 6/10/2024  Narrative: CT CHEST, ABDOMEN AND PELVIS WITHOUT IV CONTRAST INDICATION: hypotension, hypervolemic. COMPARISON: 6/3/2024 TECHNIQUE: CT examination of the chest, abdomen and pelvis was performed without intravenous contrast. Multiplanar 2D reformatted images were created from the source data. This examination, like all CT scans performed in the [facility], was performed utilizing techniques to minimize radiation dose exposure, including the use of iterative reconstruction and automated exposure control. Radiation dose length product (DLP) for this visit: 1673 mGy-cm Enteric Contrast: Not administered. FINDINGS: CHEST LUNGS: Lungs are clear. No tracheal or endobronchial lesion. PLEURA: Small left greater than right basilar effusions with compressive  atelectasis HEART/GREAT VESSELS: Heart is unremarkable for patient's age. No thoracic aortic aneurysm. MEDIASTINUM AND SLAVA: Unremarkable. CHEST WALL AND LOWER NECK: Unremarkable. ABDOMEN LIVER/BILIARY TREE: Enlarged fatty liver noted. GALLBLADDER: No calcified gallstones. No pericholecystic inflammatory change. SPLEEN: The spleen appears enlarged. PANCREAS: Unremarkable. ADRENAL GLANDS: Unremarkable. KIDNEYS/URETERS: Unremarkable. No hydronephrosis. STOMACH AND BOWEL: Moderate fecal stasis. APPENDIX: Normal. ABDOMINOPELVIC CAVITY: Mildly prominent left greater than right inguinal nodes. Left greater than right iliac chain adenopathy is redemonstrated and stable. Small periaortic and mesenteric root nodes are also identified. VESSELS: Unremarkable for patient's age. PELVIS REPRODUCTIVE ORGANS: Unremarkable for patient's age. URINARY BLADDER: Decompressed with a Robbins catheter in place ABDOMINAL WALL/INGUINAL REGIONS: Right central line tip terminating at the caval atrial junction. BONES: Age-appropriate degenerative change without acute osseous abnormality.     Impression: 1. Small left greater than right basilar effusions with associated compressive atelectasis. Lungs otherwise clear. 2. Periaortic retroperitoneal, left greater than right iliac chain and inguinal lymph nodes identified, stable and nonspecific. 3. No acute findings. Workstation performed: [id number]     XR chest portable    Result Date: 6/9/2024  Narrative: XR CHEST PORTABLE INDICATION: Fever and leukocytosis. COMPARISON: Chest CT 6/9/2024 and CXR 6/3/2024. FINDINGS: Right jugular catheter in right atrium. Mild pulmonary venous congestion. No pneumothorax or pleural effusion. Mild cardiomegaly. Bones are unremarkable for age. Normal upper abdomen.     Impression: Mild pulmonary venous congestion. Nothing to suggest pneumonia. Workstation performed: [id number]     CT lower extremity wo contrast left    Result Date: 6/9/2024  Narrative: CT left  lower extremity without IV contrast INDICATION: severe pain. COMPARISON: None. TECHNIQUE: CT examination of the left lower extremity from the hip to the foot was performed. This examination, like all CT scans performed in the [facility], was performed utilizing techniques to minimize radiation dose exposure, including the use of iterative reconstruction and automated exposure control software.  Multiplanar 2D reformatted images were created from the source data. Rad dose  1394.86 mGy-cm FINDINGS: OSSEOUS STRUCTURES:  No fracture, dislocation or destructive osseous lesion. No osseous erosion. There is mild to moderate left hip osteoarthrosis. There is moderate to severe tricompartmental osteoarthrosis of the knee. VISUALIZED MUSCULATURE:  Unremarkable. SOFT TISSUES: There is prominent subcutaneous edema and skin thickening in the thigh greater medially. There is severe circumferential subcutaneous edema and skin thickening throughout the lower leg extending into the dorsum of the foot. This may either reflect bland edema or extensive cellulitis. A well-defined rim-enhancing fluid collection not seen to suggest a discrete abscess. No soft tissue gas. OTHER PERTINENT FINDINGS:  None.     Impression: Extensive subcutaneous edema and skin thickening as described above. This may either reflect bland edema or extensive cellulitis. A well-defined rim-enhancing fluid collection not seen to suggest a discrete abscess. No soft tissue gas. Osteoarthrosis as above. No osseous erosion. Workstation performed: [id number]     IR temporary dialysis catheter placement    Result Date: 6/7/2024  Narrative: PROCEDURE: Temporary dialysis catheter placement Procedural Personnel Attending physician(s): Radames Liu MD Pre-procedure diagnosis: CLOVIS on CKD Post-procedure diagnosis: Same Clinical History: 44-year-old man with history of acute kidney injury on pre-existing chronic kidney disease, now with uremic  symptoms and need to initiate hemodialysis. PROCEDURE SUMMARY: Ultrasound and fluoroscopy guided temporary dialysis catheter placement PROCEDURE DETAILS: Pre-procedure Consent: Informed consent for the procedure including risks, benefits and alternatives was obtained and time-out was performed prior to the procedure. Preparation: The site was prepared and draped using maximal sterile barrier technique including cutaneous antisepsis. Anesthesia/sedation Level of anesthesia/sedation: No sedation Technique and Findings: Immediate preprocedure ultrasound demonstrated a large, dilated, and mildly difficult to compress right internal jugular vein, consistent with patient's fluid overload. Local anesthetic was administered. A dermatotomy was made. Access was obtained using a 21-gauge micropuncture needle under real-time ultrasound guidance, taking a course amenable for future conversion to tunneled dialysis catheter. A microwire was advanced to the right atrium and the needle exchanged for a micropuncture transitional dilator. The intravascular distance was measured with the microwire. A 24 cm temporary dialysis catheter was selected. An Amplatz wire was advanced to the inferior vena cava. The access tract was serially dilated and the temporary dialysis catheter was advanced over the wire into the right atrium. There was significant oozing/bleeding around the catheter, consistent with impaired hemostasis in the setting of uremia. This was refractory to application of pressure. Hemostasis was obtained with a 3-0 Vicryl pursestring suture around the catheter tract. The catheter was secured with nonabsorbable suture. Completion image demonstrated appropriate positioning of the newly placed catheter within the right atrium. The catheter was checked and both lumens provided brisk flow. Contrast Contrast dose: None Radiation Dose Fluoroscopy time: 1.5 MIN Reference air kerma: 86 mGy Additional Details Specimens removed: None  Estimated blood loss (mL): 200 Complications: No immediate complications.     Impression: 1. Uncomplicated nontunneled/temporary dialysis catheter placement (14 [place]vian by 24 cm). Catheter tip terminates in the right atrium. The catheter is ready for immediate use. 2. Impaired hemostasis was discussed with Dr. Timmons of Nephrology. DDAVP will be administered to help mitigate rebleeding risk. Workstation performed: [id number]     Echo complete w/ contrast if indicated    Result Date: 6/4/2024  Narrative:   Left Ventricle: Left ventricular cavity size is normal. Wall thickness is moderately increased. There is moderate concentric hypertrophy. The left ventricular ejection fraction is 58% by visual estimation. Systolic function is normal. Wall motion is normal. Diastolic function is mildly abnormal, consistent with grade I (abnormal) relaxation.  Left atrial filling pressure is normal.   Left Atrium: The atrium is mildly dilated.   Right Atrium: The atrium is mildly dilated.   Mitral Valve: There is mild regurgitation.   Tricuspid Valve: There is mild regurgitation. The right ventricular systolic pressure is moderately elevated. The estimated right ventricular systolic pressure is 58.00 mmHg.   Pulmonic Valve: There is mild regurgitation.   Compared to previous exam, there is no significant change.     CT chest abdomen pelvis wo contrast    Result Date: 6/3/2024  Narrative: CT CHEST, ABDOMEN AND PELVIS WITHOUT IV CONTRAST INDICATION: Dyspnea and renal failure. COMPARISON: CT of the chest from 2/17/2024, CT of the abdomen and pelvis from 5/4/2022, and CT of the chest, abdomen and pelvis from 12/1/2022. TECHNIQUE: CT examination of the chest, abdomen and pelvis was performed without intravenous contrast due to elevated creatinine. Multiplanar 2D reformatted images were created from the source data. This examination, like all CT scans performed in the [facility], was performed utilizing techniques to  minimize radiation dose exposure, including the use of iterative reconstruction and automated exposure control. Radiation dose length product (DLP) for this visit:  2830 mGy-cm Enteric Contrast: Enteric contrast was not administered. FINDINGS: Evaluation of visceral structures and vasculature is limited by lack of intravenous contrast. CHEST BRONCHOPULMONARY:  Clear central airways. There is slight mosaic attenuation. There are areas of atelectasis/or scarring in the lower lobes, similar to February 2024. PLEURA: Small left pleural effusion. No pneumothorax. HEART/GREAT VESSELS: The heart is normal in size. No pericardial effusion. Normal caliber thoracic aorta. MEDIASTINUM/LYMPH NODES:  No axillary or mediastinal lymphadenopathy by size criteria. Nonspecific subcentimeter mediastinal lymph nodes are seen. Evaluation for hilar lymphadenopathy is limited without IV contrast. CHEST WALL AND LOWER NECK:  Unremarkable. ABDOMEN LIVER/BILIARY TREE:  Unremarkable unenhanced appearance of the liver. No biliary ductal dilation. GALLBLADDER:  No calcified gallstones. No pericholecystic inflammatory change. SPLEEN: Unremarkable unenhanced appearance. PANCREAS:  Unremarkable unenhanced appearance. No dilation of the main pancreatic duct. ADRENAL GLANDS:  Unremarkable unenhanced appearance. KIDNEYS/URETERS:  No intrarenal or ureteral calculi. No hydronephrosis. No definite focal renal lesions. STOMACH AND BOWEL:  Evaluation of the gastrointestinal tract is somewhat limited by underdistention and lack of oral contrast. No bowel obstruction or convincing inflammation. APPENDIX: Normal appendix. ABDOMINOPELVIC CAVITY:  No ascites or pneumoperitoneum. LYMPH NODES: There are several enlarged left external iliac lymph nodes measuring up to 2.1 cm in short axis. There is also an enlarged left inguinal lymph node measuring 1.5 cm in short axis and several additional smaller left inguinal lymph nodes. These are increased compared to the  May 2022 CT. No other abdominal or pelvic lymphadenopathy by size criteria. Bilateral right external iliac lymph nodes are prominent, however below size threshold for lymphadenopathy. VESSELS: Normal caliber aorta. PELVIS REPRODUCTIVE ORGANS:  The prostate gland is not enlarged. URINARY BLADDER:  Unremarkable. ABDOMINAL WALL/INGUINAL REGIONS: No ventral abdominal wall hernia. In large portion of the subcutaneous tissues of the anterior abdominal wall are outside of the field-of-view. There is subcutaneous tissue stranding and skin thickening of the pannus as on the prior studies. There is a partially imaged tubular mildly hyperdense lesion along the left side of the undersurface of the pannus in the skin or immediately deep to the skin, which has been present dating back to at least 2021. MUSCULOSKELETAL:  No focal aggressive osseous lesions. Degenerative changes of the spine. There is a healing, subacute fracture of the right 7th rib anteriorly.     Impression: No acute pulmonary pathology. No acute abdominal or pelvic pathology within limitation of a noncontrast study. In particular, no hydronephrosis. Left inguinal and left external iliac lymphadenopathy, increased from May 2022. This may be reactive to a process in the left lower extremity or in the partially imaged pannus. Additional findings as above. Workstation performed: [id number]     NM lung ventilation / perfusion    Result Date: 6/3/2024  Narrative: VENTILATION AND PERFUSION SCAN INDICATION: Shortness of breath, hypoxia, increased D-dimer, left lower extremity swelling COMPARISON:  Chest radiograph 6/3/2024 TECHNIQUE:  Posterior ventilation imaging was performed after the inhalation of 32 mCi nebulized Tc-99m DTPA with deposition of less than 1 mCi of activity within the lungs.  Multiplanar perfusion imaging was next performed following the intravenous administration of 4.2 mCi Tc-99m labeled MAA. FINDINGS: Ventilation imaging demonstrates a minimally  heterogeneous distribution of radiopharmaceutical throughout both lungs. There is central deposition of the radiotracer. Perfusion imaging demonstrates a mildly heterogeneous distribution of the radiopharmaceutical throughout both lungs. There is mildly decreased perfusion activity in the left lung as compared to the right, but matching the ventilation pattern. There is no  significant  segmental or subsegmental defect.     Impression: The probability for pulmonary embolus is low. Workstation performed: [id number]     XR chest 1 view portable    Result Date: 6/3/2024  Narrative: XR CHEST PORTABLE INDICATION: chest pain, shortness of breath. COMPARISON: Chest radiograph February 15, 2024 FINDINGS: Clear lungs. No pneumothorax or pleural effusion. Normal cardiomediastinal silhouette. Bones are unremarkable for age. Normal upper abdomen.     Impression: No acute cardiopulmonary disease. Workstation performed: [id number]        EKG / Monitor: Personally reviewed.    Sinus rhythm at 63/min    Cardiac testing:   Results for orders placed during the hospital encounter of 21    Echo complete with contrast if indicated    Narrative  [place] [id number] [phone]    Transthoracic Echocardiogram  2D, M-mode, Doppler, and Color Doppler    Study date:  2021    Patient: MORELIA MASSEY  MR number: [id number]  Account number: [id number]  : 1980  Age: 40 years  Gender: Male  Status: Inpatient  Location: Bedside  Height: 66 in  Weight: 369.2 lb  BP: 100/ 58 mmHg    Indications: Assess left ventricular function.    Diagnoses: J96.91 - Respiratory failure, unspecified with hypoxia    Sonographer:  SIMEON Barnes  Primary Physician:  Heydi Norman MD  Referring Physician:  Lauryn Ullrich,DO  Group:  [facility]  Cardiology Fellow:  Simba Rm MD  Interpreting Physician:  Irving Arnett  MD    SUMMARY    PROCEDURE INFORMATION:  This was a technically difficult study.  Intravenous contrast ( 0.6 ml Definity/NSS) was administered.    LEFT VENTRICLE:  Systolic function was normal. Ejection fraction was estimated to be 65 %.  There were no regional wall motion abnormalities.    RIGHT VENTRICLE:  The size was normal.  Systolic function was normal.    HISTORY: PRIOR HISTORY: Respiratory failure with hypoxia, CAD s/p stent, Hyperlipidemia    PROCEDURE: The procedure was performed at the bedside. This was a routine study. The transthoracic approach was used. The study included complete 2D imaging, M-mode, complete spectral Doppler, and color Doppler. The heart rate was 66 bpm,  at the start of the study. Images were obtained from the parasternal, apical, subcostal, and suprasternal notch acoustic windows. Intravenous contrast ( 0.6 ml Definity/NSS) was administered. Echocardiographic views were limited due to  decreased penetration and patient on mechanical ventilator. This was a technically difficult study.    LEFT VENTRICLE: Size was normal. Systolic function was normal. Ejection fraction was estimated to be 65 %. There were no regional wall motion abnormalities. Wall thickness was normal. There was no evidence of concentric hypertrophy.  DOPPLER: The transmitral flow pattern was normal. The study was not technically sufficient to allow evaluation of LV diastolic function.    RIGHT VENTRICLE: The size was normal. Systolic function was normal.    LEFT ATRIUM: Size was normal.    RIGHT ATRIUM: Size was normal.    MITRAL VALVE: Valve structure was normal. There was normal leaflet separation. DOPPLER: The transmitral velocity was within the normal range. There was no evidence for stenosis. There was no significant regurgitation.    AORTIC VALVE: The valve was probably trileaflet. Leaflets exhibited normal thickness and normal cuspal separation. DOPPLER: Transaortic velocity was within the normal range. There  "was no evidence for stenosis. There was no regurgitation.    TRICUSPID VALVE: The valve structure was normal. There was normal leaflet separation. DOPPLER: The transtricuspid velocity was within the normal range. There was no evidence for stenosis. There was trace regurgitation. The tricuspid jet  envelope definition was inadequate for estimation of RV systolic pressure.    PULMONIC VALVE: Leaflets exhibited normal thickness, no calcification, and normal cuspal separation. DOPPLER: The transpulmonic velocity was within the normal range. There was no evidence for stenosis. There was no significant  regurgitation.    PERICARDIUM: There was no pericardial effusion.    AORTA: The root exhibited normal size.    SYSTEMIC VEINS: IVC: The inferior vena cava was dilated. Respirophasic changes in dimension were absent.    SYSTEM MEASUREMENT TABLES    2D  %FS: 40.76 %  Ao Diam: 3.12 cm  EDV(Teich): 125.68 ml  EF(Teich): 71.22 %  ESV(Teich): 36.17 ml  IVSd: 0.96 cm  LA Area: 18.82 cm2  LA Diam: 4.09 cm  LVEDV MOD A4C: 139.97 ml  LVEF MOD A4C: 73.53 %  LVESV MOD A4C: 37.05 ml  LVIDd: 5.13 cm  LVIDs: 3.04 cm  LVLd A4C: 8.89 cm  LVLs A4C: 6.99 cm  LVPWd: 0.97 cm  RA Area: 19.01 cm2  RVIDd: 3.58 cm  SV MOD A4C: 102.92 ml  SV(Teich): 89.51 ml    MM  TAPSE: 2.34 cm    PW  E' Sept: 0.07 m/s  E/E' Sept: 7.73  MV A Flavio: 0.38 m/s  MV Dec [place]: 2.89 m/s2  MV DecT: 197.93 ms  MV E Flavio: 0.57 m/s  MV E/A Ratio: 1.5  MV PHT: 57.4 ms  MVA By PHT: 3.83 cm2    [employer] Accredited Echocardiography Laboratory    Prepared and electronically signed by    Irving Arnett MD  Signed 07-Apr-2021 10:12:03            Dr. Gino Fulton MD, [facility]      \"This note has been constructed using a voice recognition system.Therefore there may be syntax, spelling, and/or grammatical errors. Please call if you have any questions. \"  "

## 2024-06-15 NOTE — PROGRESS NOTES
NEPHROLOGY PROGRESS NOTE   Joaquin Frankel 44 y.o. male MRN: [id number]  Unit/Bed#: [address] [place] [address] Encounter: [id number]  Reason for Consult: CLOVIS    ASSESSMENT AND PLAN:  43 yo man with PMH of morbid obesity, lower extremity lymphedema, diabetes, hypertension, CAD p/w shortness of breath for the last 2 weeks.  Nephrology is consulted for management of CLOVIS        PLAN:     # Anuric KDIGO CLOVIS stage 3, severe on CKD G3aA3, HD dependent TTS  Etiology: Most likely secondary to MGR S with possible multiple myeloma +/- diabetic glomerulopathy however rapid progression it is very uncommon  Baseline creatinine 1.3 to 1.4 mg/dL with previous episodes of CLOVIS  Peak creatinine: 10.42, before dialysis   Continue to be HD dependent, anuric  UA: Hematuria, proteinuria  UACR  5280  Renal imaging :   No hydronephrosis  Patient underwent bone marrow biopsy last week due to high risk of kidney biopsy in the settings of morbid obesity, fluid overload  If bone marrow biopsy is negative for multiple myeloma patient will require kidney biopsy  Treatment:  Remains anuric with no evidence of kidney recovery  Maintain MAP:  Over 65 mmHg if possible/avoid hypoperfusion:  Hold parameters on blood pressure medications  Avoid nephrotoxic agents such as NSAIDs, and IV contrast if possible. 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  Pending bone marrow biopsy results  Follow-up by heme-onc     #DM  HbA1c 5.7  Advised to maintain a good DM control to prevent progression of CKD   Maintain healthy diet (vegetables, fruits, whole grains, nonfat or low fat)  Weight loss  Physical activity (5 to 10 minutes to start the increase to 30 min a day)        # Sepsis  Possible cellulitis  Antibiotics as per primary team    SUBJECTIVE:  Patient seen and examined at bedside. No chest pain, shortness of breath, nausea, vomiting, abdominal pain or diarrhea.    OBJECTIVE:  Current Weight: Weight - Scale: (!) 185 kg (408 lb 8 oz)  Vitals:    06/15/24 0730   BP: 130/65   Pulse: 55   Resp:    Temp: 97.5 °F (36.4 °C)   SpO2: 96%       Intake/Output Summary (Last 24 hours) at 6/15/2024 0759  Last data filed at 6/14/2024 1701  Gross per 24 hour   Intake 240 ml   Output --   Net 240 ml     Wt Readings from Last 3 Encounters:   06/15/24 (!) 185 kg (408 lb 8 oz)   05/30/24 (!) 187 kg (413 lb)   05/22/24 (!) 189 kg (417 lb)     Temp Readings from Last 3 Encounters:   06/15/24 97.5 °F (36.4 °C)   05/30/24 (!) 97 °F (36.1 °C) (Core)   05/16/24 (!) 97 °F (36.1 °C) (Core)     BP Readings from Last 3 Encounters:   06/15/24 130/65   05/22/24 134/74   05/16/24 144/72     Pulse Readings from Last 3 Encounters:   06/15/24 55   05/22/24 76   05/16/24 59      General:  no acute distress at this time, morbidly obese  Skin:  No acute rash  Eyes:  No scleral icterus and noninjected  ENT:  mucous membranes moist  Neck:  no carotid bruits  Chest:  Clear to auscultation percussion, good respiratory effort, no use of accessory respiratory  muscles  CVS:  Regular rate and rhythm without rub   Abdomen:  soft and nontender   Extremities: no significant lower extremity edema  Neuro:  No gross focality  Psych:  Alert , cooperative   Dialysis access: PermCath    Medications:    Current Facility-Administered Medications:     acetaminophen (TYLENOL) tablet 650 mg, 650 mg, Oral, Q6H [facility], PETRA Briceño, 650 mg at 06/15/24 0525    aspirin chewable tablet 81 mg, 81 mg, Oral, Daily, Dennis Waterman MD, 81 mg at 06/14/24 2212    atorvastatin (LIPITOR) tablet 80 mg, 80 mg, Oral, Daily, PETRA Briceño, 80 mg at 06/14/24 0934    Cholecalciferol (VITAMIN D3) tablet 5,000 Units, 5,000 Units, Oral, Daily, PETRA Briceño, 5,000 Units at 06/14/24 0934    cyanocobalamin (VITAMIN B-12) tablet 1,000 mcg, 1,000 mcg, Oral, Daily, PETRA Briceño, 1,000 mcg at 06/14/24 0935    docusate sodium (COLACE) capsule 100 mg, 100 mg, Oral, BID, PETRA Briceño, 100 mg at 06/14/24 1711    gabapentin (NEURONTIN) capsule 300 mg, 300 mg, Oral, BID, PETRA Briceño, 300 mg at 06/14/24 1711    heparin (porcine) subcutaneous injection 5,000 Units, 5,000 Units, Subcutaneous, Q8H [facility], PETRA Briceño, 5,000 Units at 06/15/24 0525    HYDROmorphone HCl (DILAUDID) injection 0.2 mg, 0.2 mg, Intravenous, Q4H PRN, Dennis Waterman MD, 0.2 mg at 06/15/24 0147    insulin lispro (HumALOG/ADMELOG) 100 units/mL subcutaneous injection 1-6 Units, 1-6 Units, Subcutaneous, HS, PETRA Briceño, 1 Units at 06/06/24 2150    insulin lispro (HumALOG/ADMELOG) 100 units/mL subcutaneous injection 2-12 Units, 2-12 Units, Subcutaneous, TID AC, 2 Units at 06/08/24 1720 **AND** Fingerstick Glucose (POCT), , , TID AC, PETRA Briceño    lidocaine (LIDODERM) 5 % patch 1 patch, 1 patch, Topical, Daily, PETRA Briceño, 1 patch at 06/14/24 0935    metoprolol  succinate (TOPROL-XL) 24 hr tablet 25 mg, 25 mg, Oral, Daily, Jose De Jesus Cardozo PA-C, 25 mg at 06/14/24 1621    midodrine (PROAMATINE) tablet 5 mg, 5 mg, Oral, Before Dialysis, PETRA Briceño, 5 mg at 06/11/24 1317    nystatin (MYCOSTATIN) powder 1 Application, 1 Application, Topical, BID, PETRA Briceño, 1 Application at 06/14/24 1711    ondansetron (ZOFRAN) injection 4 mg, 4 mg, Intravenous, Q6H PRN, PETRA Briceño, 4 mg at 06/14/24 0715    oxyCODONE (ROXICODONE) split tablet 2.5 mg, 2.5 mg, Oral, Q6H PRN, Dennis Waterman MD, 2.5 mg at 06/15/24 0529    polyethylene glycol (MIRALAX) packet 17 g, 17 g, Oral, Daily PRN, PETRA Briceño    senna (SENOKOT) tablet 8.6 mg, 1 tablet, Oral, HS, PETRA Briceño, 8.6 mg at 06/14/24 2212    sodium hypochlorite (DAKIN'S HALF-STRENGTH) 0.25 percent topical solution 1 Application, 1 Application, Irrigation, Daily, PETRA Briceño, 1 Application at 06/14/24 0935    Laboratory Results:  Results from last 7 days   Lab Units 06/14/24  0751 06/13/24  0640 06/12/24  0631 06/11/24  0607 06/10/24  1249 06/10/24  0607 06/10/24  0024 06/09/24  1954 06/09/24  1538 06/09/24  0450   WBC Thousand/uL 11.18* 11.51* 10.22* 11.26*  --  13.22*  --   --   --  13.08*   HEMOGLOBIN g/dL 7.6* 7.0* 7.3* 6.9*  --  6.9*  --   --  7.2* 6.7*   HEMATOCRIT % 24.1* 22.8* 23.0* 22.0*  --  22.3*  --   --   --  21.2*   PLATELETS Thousands/uL 303 284 298 317  --  267  --   --   --  281   SODIUM mmol/L 130* 131* 128* 133* 133* 135 133* 134* 134* 132*   POTASSIUM mmol/L 4.1 4.4 4.0 4.2 4.7 4.7 4.5 4.5 4.7 4.5   CHLORIDE mmol/L 93* 96 101 98 99 100 99 98 96 97   CO2 mmol/L 27 24 26 25 25 26 23 25 25 24   BUN mg/dL 48* 67* 52* 46* 53* 50* 64* 66* 73* 71*   CREATININE mg/dL 7.63* 8.94* 7.10* 6.75* 7.62* 6.98* 8.31* 9.12* 10.42* 9.70*   CALCIUM mg/dL 8.0* 8.0* 7.9* 7.7* 7.8* 7.8* 7.7* 7.1* 7.1* 7.2*   MAGNESIUM mg/dL  --    --  2.1 2.0 2.0 2.0 1.9 1.8* 1.9 1.6*   PHOSPHORUS mg/dL  --   --  5.6* 6.4* 6.9* 6.3* 7.7* 8.5* 10.2*  --        IR tunneled central line placement   Final Result by Radames Liu MD (06/14 1626)   Successful placement of 15.5 Slovenian by 32 cm right chest/right internal jugular vein tunneled hemodialysis catheter. The tip terminates in the right atrium. The catheter is ready for immediate use.      Workstation performed: [id number]         IR biopsy bone marrow   Final Result by Geovani Rodarte MD (06/13 0953)   Bone marrow aspiration and biopsy.      Workstation performed: [id number]         XR chest portable ICU   Final Result by Daryl Mccarthy MD (06/11 0811)      1.  Right internal jugular dialysis catheter projects over the SVC. No pneumothorax.      2.  Cardiomegaly with vascular congestion.            Workstation performed: [id number]         CT lower extremity wo contrast left   Final Result by Jimbo Camacho MD (06/09 2045)      Extensive subcutaneous edema and skin thickening as described above. This may either reflect bland edema or extensive cellulitis. A well-defined rim-enhancing fluid collection not seen to suggest a discrete abscess. No soft tissue gas.      Osteoarthrosis as above. No osseous erosion.         Workstation performed: [id number]         CT chest abdomen pelvis wo contrast   Final Result by Allen Mcgowan MD (06/10 0804)   1. Small left greater than right basilar effusions with associated compressive atelectasis. Lungs otherwise clear.   2. Periaortic retroperitoneal, left greater than right iliac chain and inguinal lymph nodes identified, stable and nonspecific.   3. No acute findings.                     Workstation performed: [id number]         XR chest portable   Final Result by Danyell Ford MD (06/09 2240)      Mild pulmonary venous congestion.      Nothing to suggest pneumonia.            Workstation performed: [id number]         IR temporary  "dialysis catheter placement   Final Result by Radames Liu MD (06/07 1627)   1. Uncomplicated nontunneled/temporary dialysis catheter placement (14 Lao by 24 cm). Catheter tip terminates in the right atrium. The catheter is ready for immediate use.      2. Impaired hemostasis was discussed with Dr. Timmons of Nephrology. DDAVP will be administered to help mitigate rebleeding risk.      Workstation performed: [id number]         CT chest abdomen pelvis wo contrast   Final Result by Margoth Arango MD (06/03 1555)      No acute pulmonary pathology.      No acute abdominal or pelvic pathology within limitation of a noncontrast study. In particular, no hydronephrosis.      Left inguinal and left external iliac lymphadenopathy, increased from May 2022. This may be reactive to a process in the left lower extremity or in the partially imaged pannus.      Additional findings as above.                  Workstation performed: [id number]         NM lung ventilation / perfusion   Final Result by Daniel Ackerman MD (06/03 1412)      The probability for pulmonary embolus is low.      Workstation performed: [id number]         XR chest 1 view portable   ED Interpretation by Kit Antoine MD (06/03 0753)   Mild pulmonary edema right worse than left, no additional acute cardiopulmonary abnormality      Final Result by Miah Toledo MD (06/03 0841)      No acute cardiopulmonary disease.            Workstation performed: [id number]         IR biopsy kidney random    (Results Pending)    VAS VENOUS DUPLEX - LOWER LIMB BILATERAL    (Results Pending)       Portions of the record may have been created with voice recognition software. Occasional wrong word or \"sound a like\" substitutions may have occurred due to the inherent limitations of voice recognition software. Read the chart carefully and recognize, using context, where substitutions have occurred.    "

## 2024-06-15 NOTE — PLAN OF CARE
Problem: Prexisting or High Potential for Compromised Skin Integrity  Goal: Skin integrity is maintained or improved  Description: INTERVENTIONS:  - Identify patients at risk for skin breakdown  - Assess and monitor skin integrity  - Assess and monitor nutrition and hydration status  - Monitor labs   - Assess for incontinence   - Turn and reposition patient  - Assist with mobility/ambulation  - Relieve pressure over bony prominences  - Avoid friction and shearing  - Provide appropriate hygiene as needed including keeping skin clean and dry  - Evaluate need for skin moisturizer/barrier cream  - Collaborate with interdisciplinary team   - Patient/family teaching  - Consider wound care consult   Outcome: Progressing     Problem: PAIN - ADULT  Goal: Verbalizes/displays adequate comfort level or baseline comfort level  Description: Interventions:  - Encourage patient to monitor pain and request assistance  - Assess pain using appropriate pain scale  - Administer analgesics based on type and severity of pain and evaluate response  - Implement non-pharmacological measures as appropriate and evaluate response  - Consider cultural and social influences on pain and pain management  - Notify physician/advanced practitioner if interventions unsuccessful or patient reports new pain  Outcome: Progressing     Problem: INFECTION - ADULT  Goal: Absence or prevention of progression during hospitalization  Description: INTERVENTIONS:  - Assess and monitor for signs and symptoms of infection  - Monitor lab/diagnostic results  - Monitor all insertion sites, i.e. indwelling lines, tubes, and drains  - [place] appropriate cooling/warming therapies per order  - Administer medications as ordered  - Instruct and encourage patient and family to use good hand hygiene technique  - Identify and instruct in appropriate isolation precautions for identified infection/condition  Outcome: Progressing     Problem: SAFETY ADULT  Goal: Patient will  remain free of falls  Description: INTERVENTIONS:  - Educate patient/family on patient safety including physical limitations  - Instruct patient to call for assistance with activity   - Consult OT/PT to assist with strengthening/mobility   - Keep Call bell within reach  - Keep bed low and locked with side rails adjusted as appropriate  - Keep care items and personal belongings within reach  - Initiate and maintain comfort rounds  - Make Fall Risk Sign visible to staff  - Apply yellow socks and bracelet for high fall risk patients  - Consider moving patient to room near nurses station  Outcome: Progressing  Goal: Maintain or return to baseline ADL function  Description: INTERVENTIONS:  -  Assess patient's ability to carry out ADLs; assess patient's baseline for ADL function and identify physical deficits which impact ability to perform ADLs (bathing, care of mouth/teeth, toileting, grooming, dressing, etc.)  - Assess/evaluate cause of self-care deficits   - Assess range of motion  - Assess patient's mobility; develop plan if impaired  - Assess patient's need for assistive devices and provide as appropriate  - Encourage maximum independence but intervene and supervise when necessary  - Involve family in performance of ADLs  - Assess for home care needs following discharge   - Consider OT consult to assist with ADL evaluation and planning for discharge  - Provide patient education as appropriate  Outcome: Progressing  Goal: Maintains/Returns to pre admission functional level  Description: INTERVENTIONS:  - Perform AM-PAC 6 Click Basic Mobility/ Daily Activity assessment daily.  - Set and communicate daily mobility goal to care team and patient/family/caregiver.   - Collaborate with rehabilitation services on mobility goals if consulted  - Out of bed for toileting  - Record patient progress and toleration of activity level   Outcome: Progressing     Problem: CARDIOVASCULAR - ADULT  Goal: Maintains optimal cardiac output  and hemodynamic stability  Description: INTERVENTIONS:  - Monitor I/O, vital signs and rhythm  - Monitor for S/S and trends of decreased cardiac output  - Administer and titrate ordered vasoactive medications to optimize hemodynamic stability  - Assess quality of pulses, skin color and temperature  - Assess for signs of decreased coronary artery perfusion  - Instruct patient to report change in severity of symptoms  Outcome: Progressing  Goal: Absence of cardiac dysrhythmias or at baseline rhythm  Description: INTERVENTIONS:  - Continuous cardiac monitoring, vital signs, obtain 12 lead EKG if ordered  - Administer antiarrhythmic and heart rate control medications as ordered  - Monitor electrolytes and administer replacement therapy as ordered  Outcome: Progressing     Problem: RESPIRATORY - ADULT  Goal: Achieves optimal ventilation and oxygenation  Description: INTERVENTIONS:  - Assess for changes in respiratory status  - Assess for changes in mentation and behavior  - Position to facilitate oxygenation and minimize respiratory effort  - Oxygen administered by appropriate delivery if ordered  - Initiate smoking cessation education as indicated  - Encourage broncho-pulmonary hygiene including cough, deep breathe, Incentive Spirometry  - Assess the need for suctioning and aspirate as needed  - Assess and instruct to report SOB or any respiratory difficulty  - Respiratory Therapy support as indicated  Outcome: Progressing     Problem: SKIN/TISSUE INTEGRITY - ADULT  Goal: Incision(s), wounds(s) or drain site(s) healing without S/S of infection  Description: INTERVENTIONS  - Assess and document dressing, incision, wound bed, drain sites and surrounding tissue  - Provide patient and family education  - Perform skin care/dressing changes  Outcome: Progressing  Goal: Skin Integrity remains intact(Skin Breakdown Prevention)  Description: Assess:  -Assess extremities for adequate circulation and sensation     Bed  Management:  -Have minimal linens on bed & keep smooth, unwrinkled  -Change linens as needed when moist or perspiring  -Keep HOB at 45degrees     Toileting:  -Offer bedside commode  -Encourage activity and walks on unit  -Encourage or provide ROM exercises     Skin Care:  -Avoid use of baby powder, tape, friction and shearing, hot water or constrictive clothing  -Relieve pressure over bony prominences using   -Do not massage red bony areas    Outcome: Progressing     Problem: NEUROSENSORY - ADULT  Goal: Achieves maximal functionality and self care  Description: INTERVENTIONS  - Monitor swallowing and airway patency with patient fatigue and changes in neurological status  - Encourage and assist patient to increase activity and self care.   - Encourage visually impaired, hearing impaired and aphasic patients to use assistive/communication devices  Outcome: Progressing     Problem: METABOLIC, FLUID AND ELECTROLYTES - ADULT  Goal: Electrolytes maintained within normal limits  Description: INTERVENTIONS:  - Monitor labs and assess patient for signs and symptoms of electrolyte imbalances  - Administer electrolyte replacement as ordered  - Monitor response to electrolyte replacements, including repeat lab results as appropriate  - Instruct patient on fluid and nutrition as appropriate  Outcome: Progressing  Goal: Fluid balance maintained  Description: INTERVENTIONS:  - Monitor labs   - Monitor I/O and WT  - Instruct patient on fluid and nutrition as appropriate  - Assess for signs & symptoms of volume excess or deficit  Outcome: Progressing  Goal: Glucose maintained within target range  Description: INTERVENTIONS:  - Monitor Blood Glucose as ordered  - Assess for signs and symptoms of hyperglycemia and hypoglycemia  - Administer ordered medications to maintain glucose within target range  - Assess nutritional intake and initiate nutrition service referral as needed  Outcome: Progressing     Problem: MUSCULOSKELETAL -  ADULT  Goal: Maintain or return mobility to safest level of function  Description: INTERVENTIONS:  - Assess patient's ability to carry out ADLs; assess patient's baseline for ADL function and identify physical deficits which impact ability to perform ADLs (bathing, care of mouth/teeth, toileting, grooming, dressing, etc.)  - Assess/evaluate cause of self-care deficits   - Assess range of motion  - Assess patient's mobility  - Assess patient's need for assistive devices and provide as appropriate  - Encourage maximum independence but intervene and supervise when necessary  - Involve family in performance of ADLs  - Assess for home care needs following discharge   - Consider OT consult to assist with ADL evaluation and planning for discharge  - Provide patient education as appropriate  Outcome: Progressing  Goal: Maintain proper alignment of affected body part  Description: INTERVENTIONS:  - Support, maintain and protect limb and body alignment  - Provide patient/ family with appropriate education  Outcome: Progressing

## 2024-06-16 LAB
ANION GAP SERPL CALCULATED.3IONS-SCNC: 10 MMOL/L (ref 4–13)
BUN SERPL-MCNC: 48 MG/DL (ref 5–25)
CALCIUM SERPL-MCNC: 8 MG/DL (ref 8.4–10.2)
CHLORIDE SERPL-SCNC: 91 MMOL/L (ref 96–108)
CO2 SERPL-SCNC: 27 MMOL/L (ref 21–32)
CREAT SERPL-MCNC: 8.07 MG/DL (ref 0.6–1.3)
ERYTHROCYTE [DISTWIDTH] IN BLOOD BY AUTOMATED COUNT: 14.2 % (ref 11.6–15.1)
GFR SERPL CREATININE-BSD FRML MDRD: 7 ML/MIN/1.73SQ M
GLUCOSE SERPL-MCNC: 102 MG/DL (ref 65–140)
GLUCOSE SERPL-MCNC: 132 MG/DL (ref 65–140)
GLUCOSE SERPL-MCNC: 133 MG/DL (ref 65–140)
GLUCOSE SERPL-MCNC: 88 MG/DL (ref 65–140)
GLUCOSE SERPL-MCNC: 88 MG/DL (ref 65–140)
HCT VFR BLD AUTO: 24.2 % (ref 36.5–49.3)
HGB BLD-MCNC: 7.7 G/DL (ref 12–17)
Lab: NORMAL
MCH RBC QN AUTO: 28.5 PG (ref 26.8–34.3)
MCHC RBC AUTO-ENTMCNC: 31.8 G/DL (ref 31.4–37.4)
MCV RBC AUTO: 90 FL (ref 82–98)
MISCELLANEOUS LAB TEST RESULT: NORMAL
MISCELLANEOUS LAB TEST RESULT: NORMAL
PLATELET # BLD AUTO: 316 THOUSANDS/UL (ref 149–390)
PMV BLD AUTO: 8.1 FL (ref 8.9–12.7)
POTASSIUM SERPL-SCNC: 4 MMOL/L (ref 3.5–5.3)
RBC # BLD AUTO: 2.7 MILLION/UL (ref 3.88–5.62)
SODIUM SERPL-SCNC: 128 MMOL/L (ref 135–147)
WBC # BLD AUTO: 12.57 THOUSAND/UL (ref 4.31–10.16)

## 2024-06-16 PROCEDURE — 99232 SBSQ HOSP IP/OBS MODERATE 35: CPT | Performed by: INTERNAL MEDICINE

## 2024-06-16 PROCEDURE — 82948 REAGENT STRIP/BLOOD GLUCOSE: CPT

## 2024-06-16 PROCEDURE — 94660 CPAP INITIATION&MGMT: CPT

## 2024-06-16 PROCEDURE — 99232 SBSQ HOSP IP/OBS MODERATE 35: CPT | Performed by: STUDENT IN AN ORGANIZED HEALTH CARE EDUCATION/TRAINING PROGRAM

## 2024-06-16 PROCEDURE — 97530 THERAPEUTIC ACTIVITIES: CPT

## 2024-06-16 PROCEDURE — 97535 SELF CARE MNGMENT TRAINING: CPT

## 2024-06-16 PROCEDURE — 85027 COMPLETE CBC AUTOMATED: CPT | Performed by: INTERNAL MEDICINE

## 2024-06-16 PROCEDURE — 80048 BASIC METABOLIC PNL TOTAL CA: CPT | Performed by: INTERNAL MEDICINE

## 2024-06-16 RX ORDER — CEFAZOLIN SODIUM 2 G/50ML
2000 SOLUTION INTRAVENOUS 3 TIMES WEEKLY
Status: COMPLETED | OUTPATIENT
Start: 2024-06-18 | End: 2024-06-18

## 2024-06-16 RX ORDER — NIFEDIPINE 30 MG/1
30 TABLET, EXTENDED RELEASE ORAL DAILY
Status: DISCONTINUED | OUTPATIENT
Start: 2024-06-16 | End: 2024-06-19

## 2024-06-16 RX ORDER — BUMETANIDE 1 MG/1
2 TABLET ORAL 2 TIMES DAILY
Status: DISCONTINUED | OUTPATIENT
Start: 2024-06-16 | End: 2024-06-17

## 2024-06-16 RX ADMIN — ACETAMINOPHEN 650 MG: 325 TABLET ORAL at 11:17

## 2024-06-16 RX ADMIN — HEPARIN SODIUM 5000 UNITS: 5000 INJECTION, SOLUTION INTRAVENOUS; SUBCUTANEOUS at 05:03

## 2024-06-16 RX ADMIN — HEPARIN SODIUM 5000 UNITS: 5000 INJECTION, SOLUTION INTRAVENOUS; SUBCUTANEOUS at 21:49

## 2024-06-16 RX ADMIN — Medication 5000 UNITS: at 08:31

## 2024-06-16 RX ADMIN — BUMETANIDE 2 MG: 1 TABLET ORAL at 17:07

## 2024-06-16 RX ADMIN — Medication 2.5 MG: at 00:02

## 2024-06-16 RX ADMIN — NYSTATIN 1 APPLICATION: 100000 POWDER TOPICAL at 08:33

## 2024-06-16 RX ADMIN — BUMETANIDE 2 MG: 1 TABLET ORAL at 08:31

## 2024-06-16 RX ADMIN — CYANOCOBALAMIN TAB 500 MCG 1000 MCG: 500 TAB at 08:31

## 2024-06-16 RX ADMIN — Medication 2.5 MG: at 20:12

## 2024-06-16 RX ADMIN — HEPARIN SODIUM 5000 UNITS: 5000 INJECTION, SOLUTION INTRAVENOUS; SUBCUTANEOUS at 14:16

## 2024-06-16 RX ADMIN — HYDROMORPHONE HYDROCHLORIDE 0.2 MG: 0.2 INJECTION, SOLUTION INTRAMUSCULAR; INTRAVENOUS; SUBCUTANEOUS at 05:03

## 2024-06-16 RX ADMIN — Medication 2.5 MG: at 11:16

## 2024-06-16 RX ADMIN — ASPIRIN 81 MG CHEWABLE TABLET 81 MG: 81 TABLET CHEWABLE at 08:30

## 2024-06-16 RX ADMIN — NIFEDIPINE 30 MG: 30 TABLET, EXTENDED RELEASE ORAL at 08:31

## 2024-06-16 RX ADMIN — ACETAMINOPHEN 650 MG: 325 TABLET ORAL at 00:00

## 2024-06-16 RX ADMIN — HYDROMORPHONE HYDROCHLORIDE 0.2 MG: 0.2 INJECTION, SOLUTION INTRAMUSCULAR; INTRAVENOUS; SUBCUTANEOUS at 14:22

## 2024-06-16 RX ADMIN — ACETAMINOPHEN 650 MG: 325 TABLET ORAL at 05:03

## 2024-06-16 RX ADMIN — ATORVASTATIN CALCIUM 80 MG: 80 TABLET, FILM COATED ORAL at 08:30

## 2024-06-16 RX ADMIN — HYDROMORPHONE HYDROCHLORIDE 0.2 MG: 0.2 INJECTION, SOLUTION INTRAMUSCULAR; INTRAVENOUS; SUBCUTANEOUS at 21:49

## 2024-06-16 RX ADMIN — DOCUSATE SODIUM 100 MG: 100 CAPSULE, LIQUID FILLED ORAL at 08:31

## 2024-06-16 RX ADMIN — ACETAMINOPHEN 650 MG: 325 TABLET ORAL at 17:07

## 2024-06-16 RX ADMIN — GABAPENTIN 300 MG: 300 CAPSULE ORAL at 08:31

## 2024-06-16 RX ADMIN — METOPROLOL SUCCINATE 25 MG: 25 TABLET, EXTENDED RELEASE ORAL at 08:31

## 2024-06-16 NOTE — OCCUPATIONAL THERAPY NOTE
OT TREATMENT     06/16/24 0953   Note Type   Note Type Treatment   Pain Assessment   Pain Assessment Tool 0-10   Pain Score 6   Pain Location/Orientation Orientation: Left;Location: Groin;Location: Leg;Location: Knee   Restrictions/Precautions   Weight Bearing Precautions Per Order No   Other Precautions Fall Risk;Pain;O2;Fluid restriction   ADL   Eating Assistance 7  Independent   Grooming Assistance 6  Modified Independent   UB Bathing Assistance 6  Modified Independent   UB Bathing Deficit Setup   LB Bathing Assistance 5  Supervision/Setup  (per pt he had to use bathroom earlier and was able to do some washing with upper body and asha area, but admitted it was not thorough enough)   LB Bathing Deficit Setup   UB Dressing Assistance 6  Modified independent   UB Dressing Deficit   (hosp gown)   LB Dressing Assistance 3  Moderate Assistance   LB Dressing Deficit Don/doff L sock   Toileting Assistance  5  Supervision/Setup   Bed Mobility   Rolling R 7  Independent   Supine to Sit 6  Modified independent   Transfers   Sit to Stand 6  Modified independent   Stand to Sit 7  Independent   Toilet transfer 7  Independent   Additional items Standard toilet;Verbal cues;Increased time required   Subjective   Subjective I think I'm doing better, the swelling is better after dialysis   Cognition   Overall Cognitive Status WFL   Arousal/Participation Alert;Responsive;Cooperative   Orientation Level Oriented X4   Activity Tolerance   Activity Tolerance Patient tolerated treatment well   Assessment   Assessment Patient seen for OT treatment this AM. Pt is pleasant and cooperative. At present patient continues to be limited by  L LE pain and swelling and in the groin area. Pt  able to do bed mobility tasks with Supervision to mod indp with increased time, pt able to do basic self care tasks with set-up  to mod A. UB ADLs pt able to perform independently after set-up, but needed assist for  LBD -> L Socks and for thorough perineal  hygiene due to limited distal reach and Increased swelling in the abdominal area and L groin area. Patient ambulated household distance in room to and from bathroom initially using walker then without walker, pt able to do ambulatory transfer to and from toilet with S to indp. After session, pt showed no signs of SOB and distress. VS SpO2 96% RA, HR 66bpm and /83mmHg.  At end of session patient seated at edge bed with all needs met. Continue OT per POC.   The patient's raw score on the AM-PAC Daily Activity Inpatient Short Form is 18. A raw score of less than 19 suggests the patient may benefit from discharge to post-acute rehabilitation services. Please refer to the recommendation of the Occupational Therapist for safe discharge planning.   Plan   Treatment Interventions ADL retraining;Functional transfer training;UE strengthening/ROM;Compensatory technique education;Continued evaluation;Activityengagement   OT Frequency 3-5x/wk   Discharge Recommendation   Rehab Resource Intensity Level, OT III (Minimum Resource Intensity)   Additional Comments 2 Encouraged pt to try get out of bed and move every 1-2 hours, including walking to bathroom and or sit in bedside chair to watch TV vs. Staying in bed. Pt agreed.   AM-PAC Daily Activity Inpatient   Lower Body Dressing 2   Bathing 3   Toileting 3   Upper Body Dressing 3   Grooming 3   Eating 4   Daily Activity Raw Score 18   Daily Activity Standardized Score (Calc for Raw Score >=11) 38.66   AM-PAC Applied Cognition Inpatient   Following a Speech/Presentation 4   Understanding Ordinary Conversation 4   Taking Medications 4   Remembering Where Things Are Placed or Put Away 4   Remembering List of 4-5 Errands 4   Taking Care of Complicated Tasks 4   Applied Cognition Raw Score 24   Applied Cognition Standardized Score 62.21   End of Consult   Education Provided Yes   Patient Position at End of Consult Seated edge of bed   Nurse Communication Nurse aware of consult    Licensure   [place] License Number  Mary Rose E. Blanes, MS, OTR/L [id number]

## 2024-06-16 NOTE — PROGRESS NOTES
NEPHROLOGY PROGRESS NOTE   Joaquin Frankel 44 y.o. male MRN: [id number]  Unit/Bed#: [address] [place] [address] Encounter: [id number]  Reason for Consult: CLOVIS    ASSESSMENT AND PLAN:  43 yo man with PMH of morbid obesity, lower extremity lymphedema, diabetes, hypertension, CAD p/w shortness of breath for the last 2 weeks.  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    If bone marrow biopsy is negative patient will require kidney biopsy on this admission   Treatment:  Continue HD TTS for now   Case management consulted for outpatient dialysis unit   maintain MAP:  Over 65 mmHg if possible/avoid hypoperfusion:  Hold parameters on blood pressure medications  Avoid nephrotoxic agents such as NSAIDs, and IV contrast if possible. 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  BM biopsy done last week, pending results  Follow-up by heme-onc     #DM  HbA1c 5.7  Advised to maintain a good DM control to prevent progression of CKD   Maintain healthy diet (vegetables, fruits, whole grains, nonfat or low fat)  Weight loss  Physical activity (5 to 10 minutes to start the increase to 30 min a day)        # Sepsis  Possible cellulitis  Antibiotics as per primary team        SUBJECTIVE:  Patient seen and examined at bedside. No chest pain, shortness of breath, nausea, vomiting, abdominal pain or diarrhea.  Yesterday, well-tolerated.  UF 3 L  Patient had dialysis  OBJECTIVE:  Current Weight: Weight - Scale: (!) 185 kg (408 lb 8 oz)  Vitals:    06/15/24 2153   BP: 156/83   Pulse: 81   Resp: 19   Temp: 98.4 °F (36.9 °C)   SpO2: 90%       Intake/Output Summary (Last 24 hours) at 6/16/2024 0601  Last data filed at 6/15/2024 1740  Gross per 24 hour   Intake 870 ml   Output 3500 ml   Net -2630 ml     Wt Readings from Last 3 Encounters:   06/15/24 (!) 185 kg (408 lb 8 oz)   05/30/24 (!) 187 kg (413 lb)   05/22/24 (!) 189 kg (417 lb)     Temp Readings from Last 3 Encounters:   06/15/24 98.4 °F (36.9 °C)   05/30/24 (!) 97 °F (36.1 °C) (Core)   05/16/24 (!) 97 °F (36.1 °C) (Core)     BP Readings from Last 3 Encounters:   06/15/24 156/83   05/22/24 134/74   05/16/24 144/72     Pulse Readings from Last 3 Encounters:   06/15/24 81   05/22/24 76   05/16/24 59        General:  no acute distress at this time, obese  Skin:  No acute rash  Eyes:  No  scleral icterus and noninjected  ENT:  mucous membranes moist  Neck:  no carotid bruits  Chest:  Clear to auscultation percussion, good respiratory effort, no use of accessory respiratory muscles  CVS:  Regular rate and rhythm without rub   Abdomen:  soft and nontender   Extremities:lower extremity edema  Neuro:  No gross focality  Psych:  Alert , cooperative   Vascular access: PermCath    Medications:    Current Facility-Administered Medications:     acetaminophen (TYLENOL) tablet 650 mg, 650 mg, Oral, Q6H [facility], PETRA Briceño, 650 mg at 06/16/24 0503    aspirin chewable tablet 81 mg, 81 mg, Oral, Daily, Dennis Waterman MD, 81 mg at 06/15/24 0825    atorvastatin (LIPITOR) tablet 80 mg, 80 mg, Oral, Daily, PETRA Briceño, 80 mg at 06/15/24 0824    Cholecalciferol (VITAMIN D3) tablet 5,000 Units, 5,000 Units, Oral, Daily, PETRA Briceño, 5,000 Units at 06/15/24 0843    cyanocobalamin (VITAMIN B-12) tablet 1,000 mcg, 1,000 mcg, Oral, Daily, PETRA Briceño, 1,000 mcg at 06/15/24 0824    docusate sodium (COLACE) capsule 100 mg, 100 mg, Oral, BID, PETRA Briceño, 100 mg at 06/15/24 1757    gabapentin (NEURONTIN) capsule 300 mg, 300 mg, Oral, BID, PETRA Briceño, 300 mg at 06/15/24 1752    heparin (porcine) subcutaneous injection 5,000 Units, 5,000 Units, Subcutaneous, Q8H [facility], PETRA Briceño, 5,000 Units at 06/16/24 0503    HYDROmorphone HCl (DILAUDID) injection 0.2 mg, 0.2 mg, Intravenous, Q4H PRN, Dennis Waterman MD, 0.2 mg at 06/16/24 0503    insulin lispro (HumALOG/ADMELOG) 100 units/mL subcutaneous injection 1-6 Units, 1-6 Units, Subcutaneous, HS, PETRA Briceño, 1 Units at 06/06/24 2150    insulin lispro (HumALOG/ADMELOG) 100 units/mL subcutaneous injection 2-12 Units, 2-12 Units, Subcutaneous, TID AC, 2 Units at 06/08/24 1720 **AND** Fingerstick Glucose (POCT), , , TID AC,  PETRA Briceño    lidocaine (LIDODERM) 5 % patch 1 patch, 1 patch, Topical, Daily, PETRA Briceño, 1 patch at 06/15/24 0824    metoprolol succinate (TOPROL-XL) 24 hr tablet 25 mg, 25 mg, Oral, Daily, Jose De Jesus Cardozo PA-C, 25 mg at 06/15/24 0824    midodrine (PROAMATINE) tablet 5 mg, 5 mg, Oral, Before Dialysis, PETRA Briceño, 5 mg at 06/11/24 1317    nystatin (MYCOSTATIN) powder 1 Application, 1 Application, Topical, BID, PETRA Briceño, 1 Application at 06/15/24 1755    ondansetron (ZOFRAN) injection 4 mg, 4 mg, Intravenous, Q6H PRN, PETRA Briceño, 4 mg at 06/14/24 0715    oxyCODONE (ROXICODONE) split tablet 2.5 mg, 2.5 mg, Oral, Q6H PRN, Dennis Waterman MD, 2.5 mg at 06/16/24 0002    polyethylene glycol (MIRALAX) packet 17 g, 17 g, Oral, Daily PRN, PETRA Briceño    senna (SENOKOT) tablet 8.6 mg, 1 tablet, Oral, HS, PETRA Briceño, 8.6 mg at 06/15/24 2104    sodium hypochlorite (DAKIN'S HALF-STRENGTH) 0.25 percent topical solution 1 Application, 1 Application, Irrigation, Daily, PETRA Briceño, 1 Application at 06/15/24 0827    Laboratory Results:  Results from last 7 days   Lab Units 06/16/24  0510 06/14/24  0751 06/13/24  0640 06/12/24  0631 06/11/24  0607 06/10/24  1249 06/10/24  0607 06/10/24  0024 06/09/24  1954 06/09/24  1538   WBC Thousand/uL 12.57* 11.18* 11.51* 10.22* 11.26*  --  13.22*  --   --   --    HEMOGLOBIN g/dL 7.7* 7.6* 7.0* 7.3* 6.9*  --  6.9*  --   --  7.2*   HEMATOCRIT % 24.2* 24.1* 22.8* 23.0* 22.0*  --  22.3*  --   --   --    PLATELETS Thousands/uL 316 303 284 298 317  --  267  --   --   --    SODIUM mmol/L  --  130* 131* 128* 133* 133* 135 133* 134* 134*   POTASSIUM mmol/L  --  4.1 4.4 4.0 4.2 4.7 4.7 4.5 4.5 4.7   CHLORIDE mmol/L  --  93* 96 101 98 99 100 99 98 96   CO2 mmol/L  --  27 24 26 25 25 26 23 25 25   BUN mg/dL  --  48* 67* 52* 46* 53* 50* 64*  66* 73*   CREATININE mg/dL  --  7.63* 8.94* 7.10* 6.75* 7.62* 6.98* 8.31* 9.12* 10.42*   CALCIUM mg/dL  --  8.0* 8.0* 7.9* 7.7* 7.8* 7.8* 7.7* 7.1* 7.1*   MAGNESIUM mg/dL  --   --   --  2.1 2.0 2.0 2.0 1.9 1.8* 1.9   PHOSPHORUS mg/dL  --   --   --  5.6* 6.4* 6.9* 6.3* 7.7* 8.5* 10.2*       IR tunneled central line placement   Final Result by Radames Liu MD (06/14 1626)   Successful placement of 15.5 Mauritian by 32 cm right chest/right internal jugular vein tunneled hemodialysis catheter. The tip terminates in the right atrium. The catheter is ready for immediate use.      Workstation performed: [id number]         IR biopsy bone marrow   Final Result by Geovani Rodarte MD (06/13 0953)   Bone marrow aspiration and biopsy.      Workstation performed: [id number]         XR chest portable ICU   Final Result by Daryl Mccarthy MD (06/11 0811)      1.  Right internal jugular dialysis catheter projects over the SVC. No pneumothorax.      2.  Cardiomegaly with vascular congestion.            Workstation performed: [id number]         CT lower extremity wo contrast left   Final Result by Jimbo Camacho MD (06/09 2045)      Extensive subcutaneous edema and skin thickening as described above. This may either reflect bland edema or extensive cellulitis. A well-defined rim-enhancing fluid collection not seen to suggest a discrete abscess. No soft tissue gas.      Osteoarthrosis as above. No osseous erosion.         Workstation performed: [id number]         CT chest abdomen pelvis wo contrast   Final Result by Allen Mcgowan MD (06/10 0804)   1. Small left greater than right basilar effusions with associated compressive atelectasis. Lungs otherwise clear.   2. Periaortic retroperitoneal, left greater than right iliac chain and inguinal lymph nodes identified, stable and nonspecific.   3. No acute findings.                     Workstation performed: [id number]         XR chest portable   Final Result by  "Danyell oFrd MD (06/09 2240)      Mild pulmonary venous congestion.      Nothing to suggest pneumonia.            Workstation performed: [id number]         IR temporary dialysis catheter placement   Final Result by Radames Liu MD (06/07 1627)   1. Uncomplicated nontunneled/temporary dialysis catheter placement (14 English by 24 cm). Catheter tip terminates in the right atrium. The catheter is ready for immediate use.      2. Impaired hemostasis was discussed with Dr. Timmons of Nephrology. DDAVP will be administered to help mitigate rebleeding risk.      Workstation performed: [id number]         CT chest abdomen pelvis wo contrast   Final Result by Margoth Arango MD (06/03 1555)      No acute pulmonary pathology.      No acute abdominal or pelvic pathology within limitation of a noncontrast study. In particular, no hydronephrosis.      Left inguinal and left external iliac lymphadenopathy, increased from May 2022. This may be reactive to a process in the left lower extremity or in the partially imaged pannus.      Additional findings as above.                  Workstation performed: [id number]         NM lung ventilation / perfusion   Final Result by Daniel Ackerman MD (06/03 1412)      The probability for pulmonary embolus is low.      Workstation performed: [id number]         XR chest 1 view portable   ED Interpretation by Kit Antoine MD (06/03 0753)   Mild pulmonary edema right worse than left, no additional acute cardiopulmonary abnormality      Final Result by Miah Toledo MD (06/03 0841)      No acute cardiopulmonary disease.            Workstation performed: [id number]         IR biopsy kidney random    (Results Pending)    VAS VENOUS DUPLEX - LOWER LIMB BILATERAL    (Results Pending)       Portions of the record may have been created with voice recognition software. Occasional wrong word or \"sound a like\" substitutions may have occurred due to the inherent limitations of voice " recognition software. Read the chart carefully and recognize, using context, where substitutions have occurred.

## 2024-06-16 NOTE — PROGRESS NOTES
[facility]  Progress Note  Name: Joaquin Frankel I  MRN: [id number]  Unit/Bed#: 4 [place] 412-01 I Date of Admission: 6/3/2024   Date of Service: 6/16/2024 I Hospital Day: 13    Assessment & Plan   SIRS (systemic inflammatory response syndrome) ([facility])  Assessment & Plan  CT left lower extremity-extensive subcutaneous edema and skin thickening  CT chest abdomen pelvis-without any acute abnormality  Blood cultures negative, MRSA surveillance negative  Noted to left lower extremity cellulitis superimposed on lymphedema, now improved  Patient was also noted to have draining abscess under the pannus on 6/13.  Culture with growth of beta-hemolytic Streptococcus group C  Status post I&D of the pannus by surgery on 6/13.    Continue IV cefazolin post HD, x 1 more dose next Tuesday  Monitor clinically  Follow-up venous Doppler    Acute respiratory failure with hypoxia ([facility])  Assessment & Plan  Noted to have worsening hypoxia during hospitalization requiring MFNC  Likely multifactorial including volume overload, suspected underlying obstructive sleep apnea and obesity hypoventilation syndrome.  Currently saturating adequately on room air  No evidence of pulmonary emboli on diagnostic studies.  CT chest with evidence of bibasilar effusion with compressive atelectasis  Continue ultrafiltration as tolerated  Continue supplemental oxygen as needed  Incentive spirometry  Patient states he has an upcoming appointment with a sleep specialist and ultimately will undergo a sleep study as an outpatient.  He has been told he has overnight hypoxia.   Overnight pulse ox study qualifies patient for CPAP.      * CLOVIS (acute kidney injury) ([facility])  Assessment & Plan  POA with creatinine of 6.40.    Baseline creatinine 1.3-1.4  Noted to have worsening creatinine: Peaked at 10.42.  Initiated on HD on 6/7  Nephrology following, input appreciated  Etiology-suspected MGR S with possible multiple myeloma +/- diabetic  glomerulopathy  UA-hematuria, proteinuria.  UA CR - 5280  No evidence of hydronephrosis or obstruction on CT imaging.  Serum and urine immunofixation showing IgG kappa monoclonal band  Initially on CVV HD, now HD TTS.    Status post permacath placement 6/14  Remains with  volume overload  Continue HD as per nephrology, HD today  Volume removal as tolerated  Follow-up labs  Initially planned for renal biopsy but deferred due to high risk.  Status post bone marrow biopsy 6/12  Follow-up workup as per nephrology and hematology  IR consult for permacath placement 6/14  Discussed with nephrology, may require to reconsider kidney biopsy if bone marrow biopsy is negative.  Will hold aspirin   outpatient dialysis arrangement, patient is scheduled for outpatient dialysis on 6/19    Wound of abdomen  Assessment & Plan  Seen by wound care, recommended general surgery consultation  Appreciate surgery input  Continue local wound care per wound care nurse  Follow-up in the outpatient wound center    Anemia of chronic disease  Assessment & Plan  Patient has anemia of chronic disease likely secondary to underlying nephropathy,?  Myeloma.      He did have some episodes of hematuria.  No further episodes  No further overt bleeding  Hemoglobin gradually down trended to 6.7 g/dL, status post 1 PRBC on 6/11.  Subsequently stable  B12 310, continue supplementation  Iron panel shows iron deficiency.  Status post IV venofer 200mg every other day x 2  Continue to monitor hemoglobin closely and transfuse for hemoglobin less than 7.0 or drastic drop in hemoglobin.    Hyponatremia  Assessment & Plan  Monitor    Diabetes (HCC)  Assessment & Plan  Lab Results   Component Value Date    HGBA1C 5.7 (H) 06/09/2024     Recent Labs     06/15/24  1619 06/15/24  2007 06/16/24  0726 06/16/24  1108   POCGLU 130 126 88 102     Blood Sugar Average: Last 72 hrs:  (P) [id number]    Amaryl held during his hospitalization.   Acceptable, continue to  monitor on current regimen  Continue diabetic diet.      Hypertension  Assessment & Plan  Holding amlodipine and carvedilol for low blood pressure  Requiring midodrine predialysis as needed  Blood pressure stable/improving at present  Discontinued Bumex as patient remains anuric  Started Toprol-XL  Monitor intake output, daily weight  Monitor blood pressure    Coronary artery disease  Assessment & Plan  Patient with a history of coronary artery disease with multivessel disease status post stents.  Cardiology following, input appreciated  Held aspirin initially for biopsy, now resumed.  Discussed with nephrology, will hold aspirin again in case patient requires kidney biopsy  Started on Toprol-XL  Continue statin    Chest pain-resolved as of 6/15/2024  Assessment & Plan  POA.  Denies chest discomfort at present  VQ scan low suspicion for PE  Echo with normal EF and grade 1 diastolic dysfunction  Cardiology input appreciated    Chronic acquired lymphedema  Assessment & Plan  Patient developed LLE lymphedema after left lower extremity surgical procedure several years ago.  Patient complaining of increasing pain in the left lower extremity likely secondary to volume overload.  Left lower extremity venous Doppler: Pending  Patient would benefit from ambulatory referral to lymphedema clinic.    Morbid obesity (HCC)  Assessment & Plan  With BMI of 64.42 which is good  Encourage lifestyle modification on discharge               VTE Pharmacologic Prophylaxis: VTE Score: 7 Moderate Risk (Score 3-4) - Pharmacological DVT Prophylaxis Ordered: heparin.    Mobility:   Basic Mobility Inpatient Raw Score: 16  JH-HLM Goal: 5: Stand one or more mins  JH-HLM Achieved: 7: Walk 25 feet or more  JH-HLM Goal NOT achieved. Continue with multidisciplinary rounding and encourage appropriate mobility to improve upon JH-HLM goals.    Patient Centered Rounds: I performed bedside rounds with nursing staff today.   Discussions with Specialists or  Other Care Team Provider: Yes    Education and Discussions with Family / Patient:  Discussed with the patient.      Total Time Spent on Date of Encounter in care of patient: 45 mins. This time was spent on one or more of the following: performing physical exam; counseling and coordination of care; obtaining or reviewing history; documenting in the medical record; reviewing/ordering tests, medications or procedures; communicating with other healthcare professionals and discussing with patient's family/caregivers.    Current Length of Stay: 13 day(s)  Current Patient Status: Inpatient   Certification Statement: The patient will continue to require additional inpatient hospital stay due to acute kidney injury  Discharge Plan: Anticipate discharge in >72 hrs to discharge location to be determined pending rehab evaluations.    Code Status: Level 1 - Full Code    Subjective:   Comfortable lying in bed  Denies any chest pain or shortness of breath  Overall feels well  Tolerated dialysis well yesterday  Tolerating diet, moving bowels  Denies any bleeding      Objective:     Vitals:   Temp (24hrs), Av °F (37.2 °C), Min:98.4 °F (36.9 °C), Max:99.5 °F (37.5 °C)    Temp:  [98.4 °F (36.9 °C)-99.5 °F (37.5 °C)] 98.4 °F (36.9 °C)  HR:  [65-87] 81  Resp:  [18-20] 19  BP: (132-156)/(56-83) 156/83  SpO2:  [89 %-94 %] 90 % on room air  Body mass index is 61.69 kg/m².     Input and Output Summary (last 24 hours):     Intake/Output Summary (Last 24 hours) at 2024 0743  Last data filed at 6/15/2024 1740  Gross per 24 hour   Intake 870 ml   Output 3500 ml   Net -2630 ml         Physical Exam:   Physical Exam  Vitals and nursing note reviewed.   Constitutional:       General: He is not in acute distress.     Appearance: He is well-developed. He is obese.      Comments: Right chest wall permacath   HENT:      Head: Normocephalic and atraumatic.   Eyes:      Conjunctiva/sclera: Conjunctivae normal.   Cardiovascular:      Rate and  Rhythm: Normal rate.      Heart sounds: No murmur heard.  Pulmonary:      Effort: Pulmonary effort is normal. No respiratory distress.      Breath sounds: Decreased breath sounds present.   Abdominal:      Palpations: Abdomen is soft.      Tenderness: There is no abdominal tenderness.      Comments: Abdominal wall edema, abdominal pannus, packing in place at I&D site with small amount of drainage surrounding erythema improving   Musculoskeletal:         General: No swelling.      Right lower leg: Edema present.      Left lower leg: Edema present.      Comments: Bilateral lower extremity lymphedema, left more than right   Skin:     General: Skin is warm and dry.      Capillary Refill: Capillary refill takes less than 2 seconds.   Neurological:      Mental Status: He is alert.   Psychiatric:         Mood and Affect: Mood normal.            Additional Data:     Labs:  Results from last 7 days   Lab Units 06/16/24  0510 06/13/24  0640 06/12/24  0631 06/11/24  0607   WBC Thousand/uL 12.57*   < > 10.22* 11.26*   HEMOGLOBIN g/dL 7.7*   < > 7.3* 6.9*   HEMATOCRIT % 24.2*   < > 23.0* 22.0*   PLATELETS Thousands/uL 316   < > 298 317   BANDS PCT %  --   --  1  --    SEGS PCT %  --   --   --  82*   LYMPHO PCT %  --   --  5 6*   MONO PCT %  --   --  4 8   EOS PCT %  --   --  5 2    < > = values in this interval not displayed.     Results from last 7 days   Lab Units 06/16/24  0510   SODIUM mmol/L 128*   POTASSIUM mmol/L 4.0   CHLORIDE mmol/L 91*   CO2 mmol/L 27   BUN mg/dL 48*   CREATININE mg/dL 8.07*   ANION GAP mmol/L 10   CALCIUM mg/dL 8.0*   GLUCOSE RANDOM mg/dL 88     Results from last 7 days   Lab Units 06/12/24  0631   INR  1.13     Results from last 7 days   Lab Units 06/16/24  0726 06/15/24  2007 06/15/24  1619 06/15/24  1144 06/15/24  0810 06/14/24  2110 06/14/24  1616 06/14/24  1136 06/14/24  0725 06/13/24 2014 06/13/24  1544 06/13/24  1052   POC GLUCOSE mg/dl 88 126 130 118 90 106 92 90 110 146* 142* 107     Results  from last 7 days   Lab Units 06/09/24  1538   HEMOGLOBIN A1C % 5.7*     Results from last 7 days   Lab Units 06/09/24  1538   LACTIC ACID mmol/L 0.4*       Lines/Drains:  Invasive Devices       Peripheral Intravenous Line  Duration             Peripheral IV 06/09/24 Left;Upper Arm 6 days              Hemodialysis Catheter  Duration             HD Permanent Double Catheter 1 day                             Imaging: Reviewed radiology reports from this admission including: chest xray, chest CT scan, and abdominal/pelvic CT    Recent Cultures (last 7 days):   Results from last 7 days   Lab Units 06/13/24  1252   GRAM STAIN RESULT  3+ Polys  No organisms seen   WOUND CULTURE  2+ Growth of Beta Hemolytic Streptococcus Group C*  1+ Growth of       Last 24 Hours Medication List:   Current Facility-Administered Medications   Medication Dose Route Frequency Provider Last Rate    acetaminophen  650 mg Oral Q6H [facility] PETRA Shell      aspirin  81 mg Oral Daily Dennis Waterman MD      atorvastatin  80 mg Oral Daily Benedict PETRA Shell      Cholecalciferol  5,000 Units Oral Daily Benedict PETRA Shell      cyanocobalamin  1,000 mcg Oral Daily [facility] PETRA Sifuentes      docusate sodium  100 mg Oral BID [facility] Bear, PETRA      gabapentin  300 mg Oral BID [facility] PETRA Sifuentes      heparin (porcine)  5,000 Units Subcutaneous Q8H [facility] PETRA Shell      HYDROmorphone  0.2 mg Intravenous Q4H PRN Dennis Waterman MD      insulin lispro  1-6 Units Subcutaneous HS Benedict PETRA Shell      insulin lispro  2-12 Units Subcutaneous TID AC Benedict PETRA Shell      lidocaine  1 patch Topical Daily Benedict PETRA Shell      metoprolol succinate  25 mg Oral Daily Jose De Jesus Cardozo PA-C      midodrine  5 mg Oral Before Dialysis Benedict PETRA Shell      NIFEdipine  30 mg Oral Daily Joselyn Reyes Bahamonde, MD       nystatin  1 Application Topical BID PETRA Briceño      ondansetron  4 mg Intravenous Q6H PRN PETRA Briceño      oxyCODONE  2.5 mg Oral Q6H PRN Dennis Waterman MD      polyethylene glycol  17 g Oral Daily PRN PETRA Briceño      senna  1 tablet Oral HS PETRA Briceño      sodium hypochlorite  1 Application Irrigation Daily PETRA Briceño          Today, Patient Was Seen By: Dennis Waterman MD    **Please Note: This note may have been constructed using a voice recognition system.**

## 2024-06-16 NOTE — PLAN OF CARE
Problem: Prexisting or High Potential for Compromised Skin Integrity  Goal: Skin integrity is maintained or improved  Description: INTERVENTIONS:  - Identify patients at risk for skin breakdown  - Assess and monitor skin integrity  - Assess and monitor nutrition and hydration status  - Monitor labs   - Assess for incontinence   - Turn and reposition patient  - Assist with mobility/ambulation  - Relieve pressure over bony prominences  - Avoid friction and shearing  - Provide appropriate hygiene as needed including keeping skin clean and dry  - Evaluate need for skin moisturizer/barrier cream  - Collaborate with interdisciplinary team   - Patient/family teaching  - Consider wound care consult   Outcome: Progressing     Problem: PAIN - ADULT  Goal: Verbalizes/displays adequate comfort level or baseline comfort level  Description: Interventions:  - Encourage patient to monitor pain and request assistance  - Assess pain using appropriate pain scale  - Administer analgesics based on type and severity of pain and evaluate response  - Implement non-pharmacological measures as appropriate and evaluate response  - Consider cultural and social influences on pain and pain management  - Notify physician/advanced practitioner if interventions unsuccessful or patient reports new pain  Outcome: Progressing     Problem: INFECTION - ADULT  Goal: Absence or prevention of progression during hospitalization  Description: INTERVENTIONS:  - Assess and monitor for signs and symptoms of infection  - Monitor lab/diagnostic results  - Monitor all insertion sites, i.e. indwelling lines, tubes, and drains  - Cope appropriate cooling/warming therapies per order  - Administer medications as ordered  - Instruct and encourage patient and family to use good hand hygiene technique  - Identify and instruct in appropriate isolation precautions for identified infection/condition  Outcome: Progressing     Problem: SAFETY ADULT  Goal: Patient will  remain free of falls  Description: INTERVENTIONS:  - Educate patient/family on patient safety including physical limitations  - Instruct patient to call for assistance with activity   - Consult OT/PT to assist with strengthening/mobility   - Keep Call bell within reach  - Keep bed low and locked with side rails adjusted as appropriate  - Keep care items and personal belongings within reach  - Initiate and maintain comfort rounds  - Make Fall Risk Sign visible to staff  - Apply yellow socks and bracelet for high fall risk patients  - Consider moving patient to room near nurses station  Outcome: Progressing  Goal: Maintain or return to baseline ADL function  Description: INTERVENTIONS:  -  Assess patient's ability to carry out ADLs; assess patient's baseline for ADL function and identify physical deficits which impact ability to perform ADLs (bathing, care of mouth/teeth, toileting, grooming, dressing, etc.)  - Assess/evaluate cause of self-care deficits   - Assess range of motion  - Assess patient's mobility; develop plan if impaired  - Assess patient's need for assistive devices and provide as appropriate  - Encourage maximum independence but intervene and supervise when necessary  - Involve family in performance of ADLs  - Assess for home care needs following discharge   - Consider OT consult to assist with ADL evaluation and planning for discharge  - Provide patient education as appropriate  Outcome: Progressing  Goal: Maintains/Returns to pre admission functional level  Description: INTERVENTIONS:  - Perform AM-PAC 6 Click Basic Mobility/ Daily Activity assessment daily.  - Set and communicate daily mobility goal to care team and patient/family/caregiver.   - Collaborate with rehabilitation services on mobility goals if consulted  - Out of bed for toileting  - Record patient progress and toleration of activity level   Outcome: Progressing     Problem: CARDIOVASCULAR - ADULT  Goal: Maintains optimal cardiac output  and hemodynamic stability  Description: INTERVENTIONS:  - Monitor I/O, vital signs and rhythm  - Monitor for S/S and trends of decreased cardiac output  - Administer and titrate ordered vasoactive medications to optimize hemodynamic stability  - Assess quality of pulses, skin color and temperature  - Assess for signs of decreased coronary artery perfusion  - Instruct patient to report change in severity of symptoms  Outcome: Progressing  Goal: Absence of cardiac dysrhythmias or at baseline rhythm  Description: INTERVENTIONS:  - Continuous cardiac monitoring, vital signs, obtain 12 lead EKG if ordered  - Administer antiarrhythmic and heart rate control medications as ordered  - Monitor electrolytes and administer replacement therapy as ordered  Outcome: Progressing     Problem: RESPIRATORY - ADULT  Goal: Achieves optimal ventilation and oxygenation  Description: INTERVENTIONS:  - Assess for changes in respiratory status  - Assess for changes in mentation and behavior  - Position to facilitate oxygenation and minimize respiratory effort  - Oxygen administered by appropriate delivery if ordered  - Initiate smoking cessation education as indicated  - Encourage broncho-pulmonary hygiene including cough, deep breathe, Incentive Spirometry  - Assess the need for suctioning and aspirate as needed  - Assess and instruct to report SOB or any respiratory difficulty  - Respiratory Therapy support as indicated  Outcome: Progressing     Problem: METABOLIC, FLUID AND ELECTROLYTES - ADULT  Goal: Electrolytes maintained within normal limits  Description: INTERVENTIONS:  - Monitor labs and assess patient for signs and symptoms of electrolyte imbalances  - Administer electrolyte replacement as ordered  - Monitor response to electrolyte replacements, including repeat lab results as appropriate  - Instruct patient on fluid and nutrition as appropriate  Outcome: Progressing  Goal: Fluid balance maintained  Description: INTERVENTIONS:  -  Monitor labs   - Monitor I/O and WT  - Instruct patient on fluid and nutrition as appropriate  - Assess for signs & symptoms of volume excess or deficit  Outcome: Progressing  Goal: Glucose maintained within target range  Description: INTERVENTIONS:  - Monitor Blood Glucose as ordered  - Assess for signs and symptoms of hyperglycemia and hypoglycemia  - Administer ordered medications to maintain glucose within target range  - Assess nutritional intake and initiate nutrition service referral as needed  Outcome: Progressing     Problem: MUSCULOSKELETAL - ADULT  Goal: Maintain or return mobility to safest level of function  Description: INTERVENTIONS:  - Assess patient's ability to carry out ADLs; assess patient's baseline for ADL function and identify physical deficits which impact ability to perform ADLs (bathing, care of mouth/teeth, toileting, grooming, dressing, etc.)  - Assess/evaluate cause of self-care deficits   - Assess range of motion  - Assess patient's mobility  - Assess patient's need for assistive devices and provide as appropriate  - Encourage maximum independence but intervene and supervise when necessary  - Involve family in performance of ADLs  - Assess for home care needs following discharge   - Consider OT consult to assist with ADL evaluation and planning for discharge  - Provide patient education as appropriate  Outcome: Progressing  Goal: Maintain proper alignment of affected body part  Description: INTERVENTIONS:  - Support, maintain and protect limb and body alignment  - Provide patient/ family with appropriate education  Outcome: Progressing      Problem: NEUROSENSORY - ADULT  Goal: Achieves maximal functionality and self care  Description: INTERVENTIONS  - Monitor swallowing and airway patency with patient fatigue and changes in neurological status  - Encourage and assist patient to increase activity and self care.   - Encourage visually impaired, hearing impaired and aphasic patients to use  assistive/communication devices  Outcome: Progressing

## 2024-06-16 NOTE — PROGRESS NOTES
"Progress Note - Cardiology   [facility]     Joaquin Frankel 44 y.o. male MRN: [id number]  : 1980  Unit/Bed#: [address] [place] [address]01 Encounter: [id number]    ASSESSMENT:  Acute renal failure on CKD  On dialysis via permacath  Renal biopsy was been deferred due to high risk secondary to morbid obesity     Elevated D-dimer with low probability VQ scan for PE     Coronary artery disease,   S/p NSTEMI 2020,   s/p PCI of RCA on 2020,  History of PCI of LCX in      TTE:  EF 58%, moderate LVH, G1 DD,  Moderate biatrial enlargement, mild tricuspid regurgitation, RSVP 58 mmHg     Patient complained of chest pressure and dyspnea on exertion  Had mildly elevated troponin, nonischemic myocardial injury due to acute renal failure and SIRS     Hypertension  Hypertensive urgency on admission     Hyperlipidemia:     Type 2 diabetes mellitus:     History of Left groin cellulitis/mass:  S/p excision of left groin mass with skin grafting     Chronic Tobacco Abuse  Quit Smoking about 3 years ago     Morbid obesity:      Chronic LLE lymphedema     Anemia of chronic disease  Monoclonal gammopathy  S/p bone marrow biopsy     Noncompliant with medications as outpatient           RECOMMENDATIONS:   Continue aspirin, atorvastatin, and Toprol.  Currently on midodrine predialysis  Renal biopsy deferred due to high risk secondary to obesity  Management of ARF/dialysis as per renal service  Bone marrow biopsy results pending      Subjective / Objective:     Review of Systems  Awake, alert and comfortable  Denies chest pain, dyspnea at rest, palpitations or syncope  Vitals: Blood pressure 142/76, pulse 56, temperature 97.5 °F (36.4 °C), resp. rate 19, height 5' 7\" (1.702 m), weight (!) 179 kg (393 lb 14.4 oz), SpO2 99%.  Vitals:    06/15/24 0600 24 0600   Weight: (!) 185 kg (408 lb 8 oz) (!) 179 kg (393 lb 14.4 oz)     Body mass index is 61.69 kg/m².  BP Readings from Last 3 Encounters:   24 " 142/76   05/22/24 134/74   05/16/24 144/72     Orthostatic Blood Pressures      Flowsheet Row Most Recent Value   Blood Pressure 142/76 filed at 06/16/2024 0754   Patient Position - Orthostatic VS Lying filed at 06/15/2024 1700          I/O         06/14 0701  06/15 0700 06/15 0701  06/16 0700 06/16 0701  06/17 0700    P.O. 240 360     I.V. (mL/kg)  510 (2.8)     Total Intake(mL/kg) 240 (1.3) 870 (4.9)     Other  3500     Total Output  3500     Net +240 -2630                  Invasive Devices       Peripheral Intravenous Line  Duration             Peripheral IV 06/09/24 Left;Upper Arm 6 days              Hemodialysis Catheter  Duration             HD Permanent Double Catheter 1 day                      Intake/Output Summary (Last 24 hours) at 6/16/2024 0917  Last data filed at 6/15/2024 1740  Gross per 24 hour   Intake 750 ml   Output 3500 ml   Net -2750 ml         Physical Exam:   Neurologic:  Alert & oriented x 3,  no focal deficits noted   Constitutional: Morbidly obese  Eyes:  PERRL, conjunctiva normal   HENT:  Atraumatic, external ears normal, nose normal, .  NECK: Normal range of motion, no tenderness, neck is supple , No JVP  Respiratory:  Bilateral air entry, mostly clear to auscultation  Cardiovascular: Regular S1-S2 with a I/VI systolic murmur.  No pericardial rub or gallop audible  GI:  Soft, nondistended, audible bowel sounds, nontender, no hepatosplenomegaly appreciated  Musculoskeletal:  No tenderness, no deformities.    Extremities: Left lower extremity severe lymphedema, mild to moderate edema right lower extremity  Psychiatric:  Speech and behavior appropriate              Medications/ Allergies:     Current Facility-Administered Medications   Medication Dose Route Frequency Provider Last Rate    acetaminophen  650 mg Oral Q6H [facility] PETRA Briceño      aspirin  81 mg Oral Daily Dennis Waterman MD      atorvastatin  80 mg Oral Daily PETRA Briceño      bumetanide  2  mg Oral BID Joselyn Reyes Bahamonde, MD      Cholecalciferol  5,000 Units Oral Daily [facility] Bear, CRNP      cyanocobalamin  1,000 mcg [facility]      docusate sodium  100 mg Oral BID [facility]      gabapentin  300 mg Oral BID [facility]      heparin (porcine)  5,000 Units Subcutaneous Q8H SHAZIA [facility], CRNP      HYDROmorphone  0.2 mg Intravenous Q4H PRN Dennis Waterman MD      insulin lispro  1-6 Units [facility]cutaneous HS [facility]      insulin lispro  2-12 Units Subcutaneous TID AC [facility] Bear, CRNP      lidocaine  1 patch Topical Daily [facility]      metoprolol succinate  25 mg Oral Daily Jose De Jesus Cardozo PA-C      midodrine  5 mg Oral Before Dialysis [facility] Bear, CRNP      NIFEdipine  30 mg Oral Daily Joselyn Reyes Bahamonde, MD      nystatin  1 Application Topical BID [facility], CRNP      ondansetron  4 mg Intravenous Q6H PRN [facility] Bear, CRNP      oxyCODONE  2.5 mg Oral Q6H PRN Dennis Waterman MD      polyethylene glycol  17 g Oral Daily PRN [facility] Bear, PETRA      senna  1 tablet Oral HS [facility]      sodium hypochlorite  1 Application Irrigation Daily [facility], CRLOKI       HYDROmorphone, 0.2 mg, Q4H PRN  midodrine, 5 mg, Before Dialysis  ondansetron, 4 mg, Q6H PRN  oxyCODONE, 2.5 mg, Q6H PRN  polyethylene glycol, 17 g, Daily PRN      Allergies   Allergen Reactions    Keflex [Cephalexin] Facial Swelling and Lip Swelling    Amoxicillin Hives     childhood           Labs:   Troponins:      CBC with diff:  Results from last 7 days   Lab Units 06/16/24  0510 06/14/24  0751 06/13/24  0640 06/12/24  0631 06/11/24  0607 06/10/24  0607 06/09/24  1538   WBC Thousand/uL 12.57* 11.18* 11.51* 10.22* 11.26* 13.22*  --    HEMOGLOBIN g/dL 7.7* 7.6* 7.0* 7.3* 6.9* 6.9*  "7.2*   HEMATOCRIT % 24.2* 24.1* 22.8* 23.0* 22.0* 22.3*  --    MCV fL 90 90 90 89 89 91  --    PLATELETS Thousands/uL 316 303 284 298 317 267  --    RBC Million/uL 2.70* 2.69* 2.53* 2.58* 2.46* 2.46*  --    MCH pg 28.5 28.3 27.7 28.3 28.0 28.0  --    MCHC g/dL 31.8 31.5 30.7* 31.7 31.4 30.9*  --    RDW % 14.2 14.2 14.5 14.6 14.8 15.0  --    MPV fL 8.1* 8.2* 8.1* 8.4* 8.6* 8.8*  --    NRBC AUTO /100 WBCs  --   --   --   --  0 0  --        CMP:  Results from last 7 days   Lab Units 06/16/24  0510 06/14/24  0751 06/13/24  0640 06/12/24  0631 06/11/24  0607 06/10/24  1249 06/10/24  0607   SODIUM mmol/L 128* 130* 131* 128* 133* 133* 135   POTASSIUM mmol/L 4.0 4.1 4.4 4.0 4.2 4.7 4.7   CHLORIDE mmol/L 91* 93* 96 101 98 99 100   CO2 mmol/L 27 27 24 26 25 25 26   ANION GAP mmol/L 10 10 11 1* 10 9 9   BUN mg/dL 48* 48* 67* 52* 46* 53* 50*   CREATININE mg/dL 8.07* 7.63* 8.94* 7.10* 6.75* 7.62* 6.98*   CALCIUM mg/dL 8.0* 8.0* 8.0* 7.9* 7.7* 7.8* 7.8*   EGFR ml/min/1.73sq m 7 7 6 8 9 7 8       Magnesium:  Results from last 7 days   Lab Units 06/12/24  0631 06/11/24  0607 06/10/24  1249 06/10/24  0607 06/10/24  0024 06/09/24  1954 06/09/24  1538   MAGNESIUM mg/dL 2.1 2.0 2.0 2.0 1.9 1.8* 1.9     Coags:  Results from last 7 days   Lab Units 06/12/24  0631   INR  1.13     TSH:    No components found for: \"TSH3\"  Lipid Profile:    Hgb A1c:  Results from last 7 days   Lab Units 06/09/24  1538   HEMOGLOBIN A1C % 5.7*     NT-proBNP: No results for input(s): \"NTBNP\" in the last 72 hours.     Imaging & Testing   I have personally reviewed pertinent reports.    IR tunneled central line placement    Result Date: 6/14/2024  Narrative: PROCEDURE: Nontunneled central venous catheter removal. Tunneled dialysis catheter placement. Procedural Personnel Attending physician(s): Radames Liu MD Pre-procedure diagnosis: Anuric CLOVIS on CKD on HD without evidence of renal recovery Post-procedure diagnosis: Same Clinical History: " 44-year-old man with history of acute kidney injury on pre-existing chronic kidney disease, who had a temporary dialysis catheter placed by me on 6/6/2024. On 6/10/2024, a small tear/hole was noted in the catheter I had placed, and that catheter was removed. The critical care service placed a new temporary dialysis catheter on that date. He has had no evidence of renal recovery and requires transition to a tunneled dialysis catheter. PROCEDURE SUMMARY: - Removal of nontunneled central venous catheter - Right internal jugular vein access under real-time ultrasound guidance - Tunneled dialysis catheter placement PROCEDURE DETAILS: Pre-procedure Consent: Informed consent for the procedure including risks, benefits and alternatives was obtained and time-out was performed prior to the procedure. Preparation: The site was prepared and draped using maximal sterile barrier technique including cutaneous antisepsis. Anesthesia/sedation Level of anesthesia/sedation: Moderate sedation (conscious sedation) Anesthesia/sedation administered by: Independent trained observer under attending supervision with continuous monitoring of the patient's level of consciousness and physiologic status Total intra-service sedation time: 1 hour Technique and Findings: Initial fluoroscopic image demonstrated a temporary dialysis catheter in place, with tip terminating in the mid superior vena cava. The access trajectory was not amenable to conversion to a tunneled catheter. The temporary dialysis catheter was removed and hemostasis obtained with gentle manual pressure. Ultrasound demonstrated a widely patent right internal jugular vein, appropriate for access. Local anesthetic was administered. A dermatotomy was made. Under real-time ultrasound guidance, a 21-gauge micropuncture needle was advanced into the right internal jugular vein from a lateral approach. A permanent image was saved. A microwire was advanced and the needle exchanged for the  micropuncture transitional dilator. This access was secured with a flow switch. An appropriate length tunneled dialysis catheter was selected. Local anesthetic was administered along the planned tunnel tract. A dermatotomy was made on the chest. The catheter was tunneled from the chest incision to the right neck incision. Attention was returned to the vascular access. The microwire was exchanged for a 0.035 inch guidewire. The access site was serially dilated and a peel-away sheath was placed. The catheter was inserted via the sheath, which was then peeled away. Completion image demonstrated that the tip terminated in the right atrium. The catheter was checked and both lumens provided brisk flow for dialysis. The catheter was secured with nonabsorbable suture, both lumens flushed and capped per protocol, and a sterile dressing was applied. Contrast None Radiation Dose Fluoroscopy time: 1.1 min Reference air kerma: 20 mGy Additional Details Specimens removed: None Estimated blood loss (mL): Less than 10 Complications: No immediate complications.     Impression: Successful placement of 15.5 [place] by 32 cm right chest/right internal jugular vein tunneled hemodialysis catheter. The tip terminates in the right atrium. The catheter is ready for immediate use. Workstation performed: [id number]     US bedside procedure    Result Date: 6/13/2024  Narrative: [id number]    IR biopsy bone marrow    Result Date: 6/13/2024  Narrative: PROCEDURE: CT-guided bone marrow biopsy and bone marrow aspiration STAFF: Geovani Rodarte M.D. DOSE LENGTH PRODUCT: 1292.30 mGy-cm. This examination, like all CT scans performed in the [facility], was performed utilizing techniques to minimize radiation dose exposure, including the use of iterative reconstruction and automated exposure control. NUMBER OF IMAGES: Multiple. COMPLICATIONS: None. MEDICATIONS: 1% lidocaine Moderate sedation was monitored  by radiology nursing staff.  Monitoring of conscious sedation totaled 0 minutes. INDICATION: Rule out multiple myeloma PROCEDURE: Using intermittent CT guidance, the Eduquia system was used to perform bone marrow aspirate via the left posterior superior iliac spine. Approximately 6 mL of marrow was removed and sent to pathology. Next, bone marrow biopsy was performed. The biopsy specimen was placed in formalin and sent to pathology. The needle was then removed and hemostasis was achieved using manual compression.     Impression: Bone marrow aspiration and biopsy. Workstation performed: [id number]     XR chest portable ICU    Result Date: 6/11/2024  Narrative: XR CHEST PORTABLE ICU INDICATION: status post right IJ temporary hemodialysis catheter placement. COMPARISON: 6/8/2024 FINDINGS: Right internal jugular dialysis catheter projects over the SVC. Clear lungs. There is vascular congestion. No pneumothorax or pleural effusion. Enlarged cardiac silhouette, unchanged. Bones are unremarkable for age. Normal upper abdomen.     Impression: 1.  Right internal jugular dialysis catheter projects over the SVC. No pneumothorax. 2.  Cardiomegaly with vascular congestion. Workstation performed: [id number]     CT chest abdomen pelvis wo contrast    Result Date: 6/10/2024  Narrative: CT CHEST, ABDOMEN AND PELVIS WITHOUT IV CONTRAST INDICATION: hypotension, hypervolemic. COMPARISON: 6/3/2024 TECHNIQUE: CT examination of the chest, abdomen and pelvis was performed without intravenous contrast. Multiplanar 2D reformatted images were created from the source data. This examination, like all CT scans performed in the [facility], was performed utilizing techniques to minimize radiation dose exposure, including the use of iterative reconstruction and automated exposure control. Radiation dose length product (DLP) for this visit: 1673 mGy-cm Enteric Contrast: Not administered. FINDINGS: CHEST LUNGS: Lungs are  clear. No tracheal or endobronchial lesion. PLEURA: Small left greater than right basilar effusions with compressive atelectasis HEART/GREAT VESSELS: Heart is unremarkable for patient's age. No thoracic aortic aneurysm. MEDIASTINUM AND SLAVA: Unremarkable. CHEST WALL AND LOWER NECK: Unremarkable. ABDOMEN LIVER/BILIARY TREE: Enlarged fatty liver noted. GALLBLADDER: No calcified gallstones. No pericholecystic inflammatory change. SPLEEN: The spleen appears enlarged. PANCREAS: Unremarkable. ADRENAL GLANDS: Unremarkable. KIDNEYS/URETERS: Unremarkable. No hydronephrosis. STOMACH AND BOWEL: Moderate fecal stasis. APPENDIX: Normal. ABDOMINOPELVIC CAVITY: Mildly prominent left greater than right inguinal nodes. Left greater than right iliac chain adenopathy is redemonstrated and stable. Small periaortic and mesenteric root nodes are also identified. VESSELS: Unremarkable for patient's age. PELVIS REPRODUCTIVE ORGANS: Unremarkable for patient's age. URINARY BLADDER: Decompressed with a Robbins catheter in place ABDOMINAL WALL/INGUINAL REGIONS: Right central line tip terminating at the caval atrial junction. BONES: Age-appropriate degenerative change without acute osseous abnormality.     Impression: 1. Small left greater than right basilar effusions with associated compressive atelectasis. Lungs otherwise clear. 2. Periaortic retroperitoneal, left greater than right iliac chain and inguinal lymph nodes identified, stable and nonspecific. 3. No acute findings. Workstation performed: [id number]     XR chest portable    Result Date: 6/9/2024  Narrative: XR CHEST PORTABLE INDICATION: Fever and leukocytosis. COMPARISON: Chest CT 6/9/2024 and CXR 6/3/2024. FINDINGS: Right jugular catheter in right atrium. Mild pulmonary venous congestion. No pneumothorax or pleural effusion. Mild cardiomegaly. Bones are unremarkable for age. Normal upper abdomen.     Impression: Mild pulmonary venous congestion. Nothing to suggest pneumonia.  Workstation performed: [id number]     CT lower extremity wo contrast left    Result Date: 6/9/2024  Narrative: CT left lower extremity without IV contrast INDICATION: severe pain. COMPARISON: None. TECHNIQUE: CT examination of the left lower extremity from the hip to the foot was performed. This examination, like all CT scans performed in the [facility], was performed utilizing techniques to minimize radiation dose exposure, including the use of iterative reconstruction and automated exposure control software.  Multiplanar 2D reformatted images were created from the source data. Rad dose  1394.86 mGy-cm FINDINGS: OSSEOUS STRUCTURES:  No fracture, dislocation or destructive osseous lesion. No osseous erosion. There is mild to moderate left hip osteoarthrosis. There is moderate to severe tricompartmental osteoarthrosis of the knee. VISUALIZED MUSCULATURE:  Unremarkable. SOFT TISSUES: There is prominent subcutaneous edema and skin thickening in the thigh greater medially. There is severe circumferential subcutaneous edema and skin thickening throughout the lower leg extending into the dorsum of the foot. This may either reflect bland edema or extensive cellulitis. A well-defined rim-enhancing fluid collection not seen to suggest a discrete abscess. No soft tissue gas. OTHER PERTINENT FINDINGS:  None.     Impression: Extensive subcutaneous edema and skin thickening as described above. This may either reflect bland edema or extensive cellulitis. A well-defined rim-enhancing fluid collection not seen to suggest a discrete abscess. No soft tissue gas. Osteoarthrosis as above. No osseous erosion. Workstation performed: [id number]     IR temporary dialysis catheter placement    Result Date: 6/7/2024  Narrative: PROCEDURE: Temporary dialysis catheter placement Procedural Personnel Attending physician(s): Radames Liu MD Pre-procedure diagnosis: CLOVIS on CKD Post-procedure diagnosis: Same Clinical  History: 44-year-old man with history of acute kidney injury on pre-existing chronic kidney disease, now with uremic symptoms and need to initiate hemodialysis. PROCEDURE SUMMARY: Ultrasound and fluoroscopy guided temporary dialysis catheter placement PROCEDURE DETAILS: Pre-procedure Consent: Informed consent for the procedure including risks, benefits and alternatives was obtained and time-out was performed prior to the procedure. Preparation: The site was prepared and draped using maximal sterile barrier technique including cutaneous antisepsis. Anesthesia/sedation Level of anesthesia/sedation: No sedation Technique and Findings: Immediate preprocedure ultrasound demonstrated a large, dilated, and mildly difficult to compress right internal jugular vein, consistent with patient's fluid overload. Local anesthetic was administered. A dermatotomy was made. Access was obtained using a 21-gauge micropuncture needle under real-time ultrasound guidance, taking a course amenable for future conversion to tunneled dialysis catheter. A microwire was advanced to the right atrium and the needle exchanged for a micropuncture transitional dilator. The intravascular distance was measured with the microwire. A 24 cm temporary dialysis catheter was selected. An Amplatz wire was advanced to the inferior vena cava. The access tract was serially dilated and the temporary dialysis catheter was advanced over the wire into the right atrium. There was significant oozing/bleeding around the catheter, consistent with impaired hemostasis in the setting of uremia. This was refractory to application of pressure. Hemostasis was obtained with a 3-0 Vicryl pursestring suture around the catheter tract. The catheter was secured with nonabsorbable suture. Completion image demonstrated appropriate positioning of the newly placed catheter within the right atrium. The catheter was checked and both lumens provided brisk flow. Contrast Contrast dose:  None Radiation Dose Fluoroscopy time: 1.5 MIN Reference air kerma: 86 mGy Additional Details Specimens removed: None Estimated blood loss (mL): 200 Complications: No immediate complications.     Impression: 1. Uncomplicated nontunneled/temporary dialysis catheter placement (14 [place] by 24 cm). Catheter tip terminates in the right atrium. The catheter is ready for immediate use. 2. Impaired hemostasis was discussed with Dr. Timmons of Nephrology. DDAVP will be administered to help mitigate rebleeding risk. Workstation performed: [id number]     Echo complete w/ contrast if indicated    Result Date: 6/4/2024  Narrative:   Left Ventricle: Left ventricular cavity size is normal. Wall thickness is moderately increased. There is moderate concentric hypertrophy. The left ventricular ejection fraction is 58% by visual estimation. Systolic function is normal. Wall motion is normal. Diastolic function is mildly abnormal, consistent with grade I (abnormal) relaxation.  Left atrial filling pressure is normal.   Left Atrium: The atrium is mildly dilated.   Right Atrium: The atrium is mildly dilated.   Mitral Valve: There is mild regurgitation.   Tricuspid Valve: There is mild regurgitation. The right ventricular systolic pressure is moderately elevated. The estimated right ventricular systolic pressure is 58.00 mmHg.   Pulmonic Valve: There is mild regurgitation.   Compared to previous exam, there is no significant change.     CT chest abdomen pelvis wo contrast    Result Date: 6/3/2024  Narrative: CT CHEST, ABDOMEN AND PELVIS WITHOUT IV CONTRAST INDICATION: Dyspnea and renal failure. COMPARISON: CT of the chest from 2/17/2024, CT of the abdomen and pelvis from 5/4/2022, and CT of the chest, abdomen and pelvis from 12/1/2022. TECHNIQUE: CT examination of the chest, abdomen and pelvis was performed without intravenous contrast due to elevated creatinine. Multiplanar 2D reformatted images were created from the source data. This  examination, like all CT scans performed in the [facility], was performed utilizing techniques to minimize radiation dose exposure, including the use of iterative reconstruction and automated exposure control. Radiation dose length product (DLP) for this visit:  2830 mGy-cm Enteric Contrast: Enteric contrast was not administered. FINDINGS: Evaluation of visceral structures and vasculature is limited by lack of intravenous contrast. CHEST BRONCHOPULMONARY:  Clear central airways. There is slight mosaic attenuation. There are areas of atelectasis/or scarring in the lower lobes, similar to February 2024. PLEURA: Small left pleural effusion. No pneumothorax. HEART/GREAT VESSELS: The heart is normal in size. No pericardial effusion. Normal caliber thoracic aorta. MEDIASTINUM/LYMPH NODES:  No axillary or mediastinal lymphadenopathy by size criteria. Nonspecific subcentimeter mediastinal lymph nodes are seen. Evaluation for hilar lymphadenopathy is limited without IV contrast. CHEST WALL AND LOWER NECK:  Unremarkable. ABDOMEN LIVER/BILIARY TREE:  Unremarkable unenhanced appearance of the liver. No biliary ductal dilation. GALLBLADDER:  No calcified gallstones. No pericholecystic inflammatory change. SPLEEN: Unremarkable unenhanced appearance. PANCREAS:  Unremarkable unenhanced appearance. No dilation of the main pancreatic duct. ADRENAL GLANDS:  Unremarkable unenhanced appearance. KIDNEYS/URETERS:  No intrarenal or ureteral calculi. No hydronephrosis. No definite focal renal lesions. STOMACH AND BOWEL:  Evaluation of the gastrointestinal tract is somewhat limited by underdistention and lack of oral contrast. No bowel obstruction or convincing inflammation. APPENDIX: Normal appendix. ABDOMINOPELVIC CAVITY:  No ascites or pneumoperitoneum. LYMPH NODES: There are several enlarged left external iliac lymph nodes measuring up to 2.1 cm in short axis. There is also an enlarged left inguinal lymph node measuring  1.5 cm in short axis and several additional smaller left inguinal lymph nodes. These are increased compared to the May 2022 CT. No other abdominal or pelvic lymphadenopathy by size criteria. Bilateral right external iliac lymph nodes are prominent, however below size threshold for lymphadenopathy. VESSELS: Normal caliber aorta. PELVIS REPRODUCTIVE ORGANS:  The prostate gland is not enlarged. URINARY BLADDER:  Unremarkable. ABDOMINAL WALL/INGUINAL REGIONS: No ventral abdominal wall hernia. In large portion of the subcutaneous tissues of the anterior abdominal wall are outside of the field-of-view. There is subcutaneous tissue stranding and skin thickening of the pannus as on the prior studies. There is a partially imaged tubular mildly hyperdense lesion along the left side of the undersurface of the pannus in the skin or immediately deep to the skin, which has been present dating back to at least 2021. MUSCULOSKELETAL:  No focal aggressive osseous lesions. Degenerative changes of the spine. There is a healing, subacute fracture of the right 7th rib anteriorly.     Impression: No acute pulmonary pathology. No acute abdominal or pelvic pathology within limitation of a noncontrast study. In particular, no hydronephrosis. Left inguinal and left external iliac lymphadenopathy, increased from May 2022. This may be reactive to a process in the left lower extremity or in the partially imaged pannus. Additional findings as above. Workstation performed: [id number]     NM lung ventilation / perfusion    Result Date: 6/3/2024  Narrative: VENTILATION AND PERFUSION SCAN INDICATION: Shortness of breath, hypoxia, increased D-dimer, left lower extremity swelling COMPARISON:  Chest radiograph 6/3/2024 TECHNIQUE:  Posterior ventilation imaging was performed after the inhalation of 32 mCi nebulized Tc-99m DTPA with deposition of less than 1 mCi of activity within the lungs.  Multiplanar perfusion imaging was next performed following the  intravenous administration of 4.2 mCi Tc-99m labeled MAA. FINDINGS: Ventilation imaging demonstrates a minimally heterogeneous distribution of radiopharmaceutical throughout both lungs. There is central deposition of the radiotracer. Perfusion imaging demonstrates a mildly heterogeneous distribution of the radiopharmaceutical throughout both lungs. There is mildly decreased perfusion activity in the left lung as compared to the right, but matching the ventilation pattern. There is no  significant  segmental or subsegmental defect.     Impression: The probability for pulmonary embolus is low. Workstation performed: [id number]     XR chest 1 view portable    Result Date: 6/3/2024  Narrative: XR CHEST PORTABLE INDICATION: chest pain, shortness of breath. COMPARISON: Chest radiograph February 15, 2024 FINDINGS: Clear lungs. No pneumothorax or pleural effusion. Normal cardiomediastinal silhouette. Bones are unremarkable for age. Normal upper abdomen.     Impression: No acute cardiopulmonary disease. Workstation performed: [id number]        Cardiac testing:   Results for orders placed during the hospital encounter of 21    Echo complete with contrast if indicated    Narrative  [address] [address] [phone]    Transthoracic Echocardiogram  2D, M-mode, Doppler, and Color Doppler    Study date:  2021    Patient: MORELIA MASSEY  MR number: [id number]  Account number: [id number]  : 1980  Age: 40 years  Gender: Male  Status: Inpatient  Location: Bedside  Height: 66 in  Weight: 369.2 lb  BP: 100/ 58 mmHg    Indications: Assess left ventricular function.    Diagnoses: J96.91 - Respiratory failure, unspecified with hypoxia    Sonographer:  SIMEON Barnes  Primary Physician:  Heydi Norman MD  Referring Physician:  Lauryn Ullrich,DO  Group:  [facility]  Cardiology Fellow:  Simba Rm  MD  Interpreting Physician:  Irving Arnett MD    SUMMARY    PROCEDURE INFORMATION:  This was a technically difficult study.  Intravenous contrast ( 0.6 ml Definity/NSS) was administered.    LEFT VENTRICLE:  Systolic function was normal. Ejection fraction was estimated to be 65 %.  There were no regional wall motion abnormalities.    RIGHT VENTRICLE:  The size was normal.  Systolic function was normal.    HISTORY: PRIOR HISTORY: Respiratory failure with hypoxia, CAD s/p stent, Hyperlipidemia    PROCEDURE: The procedure was performed at the bedside. This was a routine study. The transthoracic approach was used. The study included complete 2D imaging, M-mode, complete spectral Doppler, and color Doppler. The heart rate was 66 bpm,  at the start of the study. Images were obtained from the parasternal, apical, subcostal, and suprasternal notch acoustic windows. Intravenous contrast ( 0.6 ml Definity/NSS) was administered. Echocardiographic views were limited due to  decreased penetration and patient on mechanical ventilator. This was a technically difficult study.    LEFT VENTRICLE: Size was normal. Systolic function was normal. Ejection fraction was estimated to be 65 %. There were no regional wall motion abnormalities. Wall thickness was normal. There was no evidence of concentric hypertrophy.  DOPPLER: The transmitral flow pattern was normal. The study was not technically sufficient to allow evaluation of LV diastolic function.    RIGHT VENTRICLE: The size was normal. Systolic function was normal.    LEFT ATRIUM: Size was normal.    RIGHT ATRIUM: Size was normal.    MITRAL VALVE: Valve structure was normal. There was normal leaflet separation. DOPPLER: The transmitral velocity was within the normal range. There was no evidence for stenosis. There was no significant regurgitation.    AORTIC VALVE: The valve was probably trileaflet. Leaflets exhibited normal thickness and normal cuspal separation. DOPPLER: Transaortic  "velocity was within the normal range. There was no evidence for stenosis. There was no regurgitation.    TRICUSPID VALVE: The valve structure was normal. There was normal leaflet separation. DOPPLER: The transtricuspid velocity was within the normal range. There was no evidence for stenosis. There was trace regurgitation. The tricuspid jet  envelope definition was inadequate for estimation of RV systolic pressure.    PULMONIC VALVE: Leaflets exhibited normal thickness, no calcification, and normal cuspal separation. DOPPLER: The transpulmonic velocity was within the normal range. There was no evidence for stenosis. There was no significant  regurgitation.    PERICARDIUM: There was no pericardial effusion.    AORTA: The root exhibited normal size.    SYSTEMIC VEINS: IVC: The inferior vena cava was dilated. Respirophasic changes in dimension were absent.    SYSTEM MEASUREMENT TABLES    2D  %FS: 40.76 %  Ao Diam: 3.12 cm  EDV(Teich): 125.68 ml  EF(Teich): 71.22 %  ESV(Teich): 36.17 ml  IVSd: 0.96 cm  LA Area: 18.82 cm2  LA Diam: 4.09 cm  LVEDV MOD A4C: 139.97 ml  LVEF MOD A4C: 73.53 %  LVESV MOD A4C: 37.05 ml  LVIDd: 5.13 cm  LVIDs: 3.04 cm  LVLd A4C: 8.89 cm  LVLs A4C: 6.99 cm  LVPWd: 0.97 cm  RA Area: 19.01 cm2  RVIDd: 3.58 cm  SV MOD A4C: 102.92 ml  SV(Teich): 89.51 ml    MM  TAPSE: 2.34 cm    PW  E' Sept: 0.07 m/s  E/E' Sept: 7.73  MV A Flavio: 0.38 m/s  MV Dec Jay: 2.89 m/s2  MV DecT: 197.93 ms  MV E Flavio: 0.57 m/s  MV E/A Ratio: 1.5  MV PHT: 57.4 ms  MVA By PHT: 3.83 cm2    [employer] Accredited Echocardiography Laboratory    Prepared and electronically signed by    Irving Arnett MD  Signed 07-Apr-2021 10:12:03            Dr. Gino Fulton MD, [facility]      \"This note has been constructed using a voice recognition system.Therefore there may be syntax, spelling, and/or grammatical errors. Please call if you have any questions. \"  "

## 2024-06-17 ENCOUNTER — APPOINTMENT (INPATIENT)
Dept: RADIOLOGY | Facility: HOSPITAL | Age: 44
DRG: 673 | End: 2024-06-17
Payer: COMMERCIAL

## 2024-06-17 LAB
ANION GAP SERPL CALCULATED.3IONS-SCNC: 9 MMOL/L (ref 4–13)
BUN SERPL-MCNC: 56 MG/DL (ref 5–25)
CALCIUM SERPL-MCNC: 8.1 MG/DL (ref 8.4–10.2)
CHLORIDE SERPL-SCNC: 93 MMOL/L (ref 96–108)
CO2 SERPL-SCNC: 27 MMOL/L (ref 21–32)
CREAT SERPL-MCNC: 9.48 MG/DL (ref 0.6–1.3)
ERYTHROCYTE [DISTWIDTH] IN BLOOD BY AUTOMATED COUNT: 14.2 % (ref 11.6–15.1)
GFR SERPL CREATININE-BSD FRML MDRD: 6 ML/MIN/1.73SQ M
GLUCOSE SERPL-MCNC: 108 MG/DL (ref 65–140)
GLUCOSE SERPL-MCNC: 118 MG/DL (ref 65–140)
GLUCOSE SERPL-MCNC: 123 MG/DL (ref 65–140)
GLUCOSE SERPL-MCNC: 151 MG/DL (ref 65–140)
GLUCOSE SERPL-MCNC: 99 MG/DL (ref 65–140)
HCT VFR BLD AUTO: 23.6 % (ref 36.5–49.3)
HGB BLD-MCNC: 7.5 G/DL (ref 12–17)
MCH RBC QN AUTO: 28.2 PG (ref 26.8–34.3)
MCHC RBC AUTO-ENTMCNC: 31.8 G/DL (ref 31.4–37.4)
MCV RBC AUTO: 89 FL (ref 82–98)
PLATELET # BLD AUTO: 346 THOUSANDS/UL (ref 149–390)
PMV BLD AUTO: 8.2 FL (ref 8.9–12.7)
POTASSIUM SERPL-SCNC: 4.5 MMOL/L (ref 3.5–5.3)
RBC # BLD AUTO: 2.66 MILLION/UL (ref 3.88–5.62)
SODIUM SERPL-SCNC: 129 MMOL/L (ref 135–147)
WBC # BLD AUTO: 11.74 THOUSAND/UL (ref 4.31–10.16)

## 2024-06-17 PROCEDURE — 80048 BASIC METABOLIC PNL TOTAL CA: CPT | Performed by: INTERNAL MEDICINE

## 2024-06-17 PROCEDURE — 97535 SELF CARE MNGMENT TRAINING: CPT

## 2024-06-17 PROCEDURE — 82948 REAGENT STRIP/BLOOD GLUCOSE: CPT

## 2024-06-17 PROCEDURE — 99232 SBSQ HOSP IP/OBS MODERATE 35: CPT | Performed by: INTERNAL MEDICINE

## 2024-06-17 PROCEDURE — 93970 EXTREMITY STUDY: CPT

## 2024-06-17 PROCEDURE — 99233 SBSQ HOSP IP/OBS HIGH 50: CPT | Performed by: INTERNAL MEDICINE

## 2024-06-17 PROCEDURE — 85027 COMPLETE CBC AUTOMATED: CPT | Performed by: INTERNAL MEDICINE

## 2024-06-17 PROCEDURE — 93970 EXTREMITY STUDY: CPT | Performed by: SURGERY

## 2024-06-17 PROCEDURE — 97110 THERAPEUTIC EXERCISES: CPT

## 2024-06-17 RX ORDER — OXYCODONE HYDROCHLORIDE 5 MG/1
5 TABLET ORAL EVERY 6 HOURS PRN
Status: DISCONTINUED | OUTPATIENT
Start: 2024-06-17 | End: 2024-06-26 | Stop reason: HOSPADM

## 2024-06-17 RX ORDER — HYDROMORPHONE HCL/PF 1 MG/ML
0.5 SYRINGE (ML) INJECTION EVERY 4 HOURS PRN
Status: DISCONTINUED | OUTPATIENT
Start: 2024-06-17 | End: 2024-06-21

## 2024-06-17 RX ORDER — ACETAMINOPHEN 325 MG/1
650 TABLET ORAL EVERY 6 HOURS PRN
Status: DISCONTINUED | OUTPATIENT
Start: 2024-06-17 | End: 2024-06-18

## 2024-06-17 RX ADMIN — ATORVASTATIN CALCIUM 80 MG: 80 TABLET, FILM COATED ORAL at 08:57

## 2024-06-17 RX ADMIN — CYANOCOBALAMIN TAB 500 MCG 1000 MCG: 500 TAB at 08:57

## 2024-06-17 RX ADMIN — DOCUSATE SODIUM 100 MG: 100 CAPSULE, LIQUID FILLED ORAL at 08:57

## 2024-06-17 RX ADMIN — OXYCODONE HYDROCHLORIDE 5 MG: 5 TABLET ORAL at 18:13

## 2024-06-17 RX ADMIN — HYDROMORPHONE HYDROCHLORIDE 0.5 MG: 1 INJECTION, SOLUTION INTRAMUSCULAR; INTRAVENOUS; SUBCUTANEOUS at 23:34

## 2024-06-17 RX ADMIN — METOPROLOL SUCCINATE 25 MG: 25 TABLET, EXTENDED RELEASE ORAL at 08:57

## 2024-06-17 RX ADMIN — OXYCODONE HYDROCHLORIDE 5 MG: 5 TABLET ORAL at 12:18

## 2024-06-17 RX ADMIN — HYOSCYAMINE SULFATE 1 APPLICATION: 16 SOLUTION at 18:54

## 2024-06-17 RX ADMIN — LIDOCAINE 5% 1 PATCH: 700 PATCH TOPICAL at 08:57

## 2024-06-17 RX ADMIN — ACETAMINOPHEN 650 MG: 325 TABLET ORAL at 00:11

## 2024-06-17 RX ADMIN — HYDROMORPHONE HYDROCHLORIDE 0.2 MG: 0.2 INJECTION, SOLUTION INTRAMUSCULAR; INTRAVENOUS; SUBCUTANEOUS at 04:43

## 2024-06-17 RX ADMIN — NYSTATIN 1 APPLICATION: 100000 POWDER TOPICAL at 09:03

## 2024-06-17 RX ADMIN — NIFEDIPINE 30 MG: 30 TABLET, EXTENDED RELEASE ORAL at 08:57

## 2024-06-17 RX ADMIN — BUMETANIDE 2 MG: 1 TABLET ORAL at 08:57

## 2024-06-17 RX ADMIN — HYDROMORPHONE HYDROCHLORIDE 0.5 MG: 1 INJECTION, SOLUTION INTRAMUSCULAR; INTRAVENOUS; SUBCUTANEOUS at 18:56

## 2024-06-17 RX ADMIN — NYSTATIN 1 APPLICATION: 100000 POWDER TOPICAL at 18:15

## 2024-06-17 RX ADMIN — Medication 5000 UNITS: at 08:57

## 2024-06-17 RX ADMIN — HYDROMORPHONE HYDROCHLORIDE 0.5 MG: 1 INJECTION, SOLUTION INTRAMUSCULAR; INTRAVENOUS; SUBCUTANEOUS at 14:09

## 2024-06-17 RX ADMIN — Medication 2.5 MG: at 03:11

## 2024-06-17 RX ADMIN — GABAPENTIN 300 MG: 300 CAPSULE ORAL at 08:57

## 2024-06-17 RX ADMIN — HEPARIN SODIUM 5000 UNITS: 5000 INJECTION, SOLUTION INTRAVENOUS; SUBCUTANEOUS at 21:24

## 2024-06-17 RX ADMIN — HEPARIN SODIUM 5000 UNITS: 5000 INJECTION, SOLUTION INTRAVENOUS; SUBCUTANEOUS at 05:11

## 2024-06-17 RX ADMIN — SENNOSIDES 8.6 MG: 8.6 TABLET, FILM COATED ORAL at 21:14

## 2024-06-17 RX ADMIN — HEPARIN SODIUM 5000 UNITS: 5000 INJECTION, SOLUTION INTRAVENOUS; SUBCUTANEOUS at 14:12

## 2024-06-17 RX ADMIN — ACETAMINOPHEN 650 MG: 325 TABLET ORAL at 05:11

## 2024-06-17 NOTE — PROGRESS NOTES
[facility]  Progress Note  Name: Joaquin Frankel I  MRN: [id number]  Unit/Bed#: 4 [place] 412-01 I Date of Admission: 6/3/2024   Date of Service: 6/17/2024 I Hospital Day: 14    Assessment & Plan   SIRS (systemic inflammatory response syndrome) ([facility])  Assessment & Plan  CT left lower extremity-extensive subcutaneous edema and skin thickening  CT chest abdomen pelvis-without any acute abnormality  Blood cultures negative, MRSA surveillance negative  Noted to left lower extremity cellulitis superimposed on lymphedema, now improved  Patient was also noted to have draining abscess under the pannus on 6/13.  Culture with growth of beta-hemolytic Streptococcus group C  Status post I&D of the pannus by surgery on 6/13.    Continue IV cefazolin post HD, x 1 more dose 6/18  Monitor clinically  Follow-up venous Doppler    Acute respiratory failure with hypoxia ([facility])  Assessment & Plan  Noted to have worsening hypoxia during hospitalization requiring MFNC  Likely multifactorial including volume overload, suspected underlying obstructive sleep apnea and obesity hypoventilation syndrome.  Currently saturating adequately on room air  No evidence of pulmonary emboli on diagnostic studies.  CT chest with evidence of bibasilar effusion with compressive atelectasis  Continue ultrafiltration as tolerated  Continue supplemental oxygen as needed  Incentive spirometry  Patient states he has an upcoming appointment with a sleep specialist and ultimately will undergo a sleep study as an outpatient.  He has been told he has overnight hypoxia.      * CLOVIS (acute kidney injury) ([facility])  Assessment & Plan  POA with creatinine of 6.40.    Baseline creatinine 1.3-1.4  Noted to have worsening creatinine: Peaked at 10.42.  Initiated on HD on 6/7  UA-hematuria, proteinuria.  UA CR - 5280  -- Serologies: ANCA titer was negative, anti-GBM was negative.  JOSE ROBERTO/anti-double-stranded DNA was negative.  Kappa/lambda ratio was within  normal limits.  Serum and urine immunofixation showed IgG kappa monoclonal bands.  Complement C3 and C4 were normal.  Beta-2 microglobulin was elevated.  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    Status post permacath placement 6/14.  No evidence of renal recovery  Remains with  volume overload but improving with HD  Continue HD as per nephrology, currently on TTS schedule  Volume removal as tolerated  Follow-up labs  Initially planned for renal biopsy but deferred due to high risk.  Status post bone marrow biopsy 6/12  Follow-up workup as per nephrology and hematology  IR consult for permacath placement 6/14  Discussed with nephrology, may require to reconsider kidney biopsy if bone marrow biopsy is negative.  Will hold aspirin   outpatient dialysis arrangement, patient is scheduled for outpatient dialysis on 6/19    Wound of abdomen  Assessment & Plan  Seen by wound care, recommended general surgery consultation  Appreciate surgery input  Continue local wound care per wound care nurse  Follow-up in the outpatient wound center    Anemia of chronic disease  Assessment & Plan  Patient has anemia of chronic disease likely secondary to underlying nephropathy,?  Myeloma.      He did have some episodes of hematuria.  No further episodes  No further overt bleeding  Hemoglobin gradually down trended to 6.7 g/dL, status post 1 PRBC on 6/11.  Subsequently stable  B12 310, continue supplementation  Iron panel shows iron deficiency.  Status post IV venofer 200mg every other day x 2  Continue to monitor hemoglobin closely and transfuse for hemoglobin less than 7.0 or drastic drop in hemoglobin.    Hyponatremia  Assessment & Plan  Monitor    Diabetes (HCC)  Assessment & Plan  Lab Results   Component Value Date    HGBA1C 5.7 (H) 06/09/2024     Recent Labs      06/16/24  1607 06/16/24 2009 06/17/24  0729 06/17/24  1100   POCGLU 132 133 99 118     Blood Sugar Average: Last 72 hrs:  (P) [id number]    Amaryl held during his hospitalization.   Acceptable, continue to monitor on current regimen  Continue diabetic diet.      Hypertension  Assessment & Plan  Held amlodipine and carvedilol for low blood pressure initially  Requiring midodrine predialysis as needed  Blood pressure stable/improving at present  Discontinued Bumex as patient remains anuric  Started Toprol-XL  Procardia added  Monitor intake output, daily weight  Monitor blood pressure    Coronary artery disease  Assessment & Plan  Patient with a history of coronary artery disease with multivessel disease status post stents.  Cardiology following, input appreciated  Held aspirin initially for biopsy, subsequently resumed.  Discussed with nephrology, holding aspirin again in case patient requires kidney biopsy  Continue on Toprol-XL  Continue statin    Chest pain-resolved as of 6/15/2024  Assessment & Plan  POA.  Denies chest discomfort at present  VQ scan low suspicion for PE  Echo with normal EF and grade 1 diastolic dysfunction  Cardiology input appreciated    Chronic acquired lymphedema  Assessment & Plan  Patient developed LLE lymphedema after left lower extremity surgical procedure several years ago.  Patient complaining of increasing pain in the left lower extremity likely secondary to volume overload.  Left lower extremity venous Doppler: Pending  Patient would benefit from ambulatory referral to lymphedema clinic.    Morbid obesity (HCC)  Assessment & Plan  With BMI of 64.42 which is good  Encourage lifestyle modification on discharge               VTE Pharmacologic Prophylaxis: VTE Score: 7 Moderate Risk (Score 3-4) - Pharmacological DVT Prophylaxis Ordered: heparin.    Mobility:   Basic Mobility Inpatient Raw Score: 16  JH-HLM Goal: 5: Stand one or more mins  -HLM Achieved: 7: Walk 25 feet or  more  JH-HLM Goal NOT achieved. Continue with multidisciplinary rounding and encourage appropriate mobility to improve upon JH-HLM goals.    Patient Centered Rounds: I performed bedside rounds with nursing staff today.   Discussions with Specialists or Other Care Team Provider: Yes    Education and Discussions with Family / Patient:  Discussed with the patient.      Total Time Spent on Date of Encounter in care of patient: 45 mins. This time was spent on one or more of the following: performing physical exam; counseling and coordination of care; obtaining or reviewing history; documenting in the medical record; reviewing/ordering tests, medications or procedures; communicating with other healthcare professionals and discussing with patient's family/caregivers.    Current Length of Stay: 14 day(s)  Current Patient Status: Inpatient   Certification Statement: The patient will continue to require additional inpatient hospital stay due to acute kidney injury  Discharge Plan: Anticipate discharge in >72 hrs to discharge location to be determined pending rehab evaluations.    Code Status: Level 1 - Full Code    Subjective:     Sitting up in chair  Denies any chest pain or shortness of breath  Still with significant lower extremity swelling but improving  Reports lower back discomfort at biopsy site and discomfort due to significant lower extremity swelling    Reports that he is ambulating better    Objective:     Vitals:   Temp (24hrs), Av.7 °F (36.5 °C), Min:97.5 °F (36.4 °C), Max:98 °F (36.7 °C)    Temp:  [97.5 °F (36.4 °C)-98 °F (36.7 °C)] 97.8 °F (36.6 °C)  HR:  [56-70] 69  Resp:  [19] 19  BP: (121-142)/(56-76) 121/56  SpO2:  [94 %-99 %] 95 % on room air  Body mass index is 62.09 kg/m².     Input and Output Summary (last 24 hours):     Intake/Output Summary (Last 24 hours) at 2024 0736  Last data filed at 2024  Gross per 24 hour   Intake 110 ml   Output --   Net 110 ml         Physical Exam:    Physical Exam  Vitals and nursing note reviewed.   Constitutional:       General: He is not in acute distress.     Appearance: He is well-developed. He is obese.   HENT:      Head: Normocephalic and atraumatic.   Eyes:      Conjunctiva/sclera: Conjunctivae normal.   Cardiovascular:      Rate and Rhythm: Normal rate.      Heart sounds: No murmur heard.     Comments: Right chest wall permacath  Pulmonary:      Effort: Pulmonary effort is normal. No respiratory distress.      Breath sounds: Decreased breath sounds present.   Abdominal:      Palpations: Abdomen is soft.      Tenderness: There is no abdominal tenderness.      Comments: Abdominal wall edema, abdominal pannus, dressing in place.  Minimal drainage without surrounding erythema at I&D site   Musculoskeletal:         General: No swelling.      Right lower leg: Edema present.      Left lower leg: Edema present.      Comments: Bilateral lower extremity lymphedema left more than right   Skin:     General: Skin is warm and dry.      Capillary Refill: Capillary refill takes less than 2 seconds.   Neurological:      Mental Status: He is alert. Mental status is at baseline.   Psychiatric:         Mood and Affect: Mood normal.            Additional Data:     Labs:  Results from last 7 days   Lab Units 06/17/24  0450 06/13/24  0640 06/12/24  0631 06/11/24  0607   WBC Thousand/uL 11.74*   < > 10.22* 11.26*   HEMOGLOBIN g/dL 7.5*   < > 7.3* 6.9*   HEMATOCRIT % 23.6*   < > 23.0* 22.0*   PLATELETS Thousands/uL 346   < > 298 317   BANDS PCT %  --   --  1  --    SEGS PCT %  --   --   --  82*   LYMPHO PCT %  --   --  5 6*   MONO PCT %  --   --  4 8   EOS PCT %  --   --  5 2    < > = values in this interval not displayed.     Results from last 7 days   Lab Units 06/17/24  0450   SODIUM mmol/L 129*   POTASSIUM mmol/L 4.5   CHLORIDE mmol/L 93*   CO2 mmol/L 27   BUN mg/dL 56*   CREATININE mg/dL 9.48*   ANION GAP mmol/L 9   CALCIUM mg/dL 8.1*   GLUCOSE RANDOM mg/dL 108      Results from last 7 days   Lab Units 06/12/24  0631   INR  1.13     Results from last 7 days   Lab Units 06/17/24  0729 06/16/24 2009 06/16/24  1607 06/16/24  1108 06/16/24  0726 06/15/24  2007 06/15/24  1619 06/15/24  1144 06/15/24  0810 06/14/24  2110 06/14/24  1616 06/14/24  1136   POC GLUCOSE mg/dl 99 133 132 102 88 126 130 118 90 106 92 90                   Lines/Drains:  Invasive Devices       Peripheral Intravenous Line  Duration             Peripheral IV 06/09/24 Left;Upper Arm 7 days              Hemodialysis Catheter  Duration             HD Permanent Double Catheter 2 days                             Imaging: Reviewed radiology reports from this admission including: chest xray, chest CT scan, and abdominal/pelvic CT    Recent Cultures (last 7 days):   Results from last 7 days   Lab Units 06/13/24  1252   GRAM STAIN RESULT  3+ Polys  No organisms seen   WOUND CULTURE  2+ Growth of Beta Hemolytic Streptococcus Group C*  1+ Growth of       Last 24 Hours Medication List:   Current Facility-Administered Medications   Medication Dose Route Frequency Provider Last Rate    acetaminophen  650 mg Oral Q6H [facility] PETRA Shell      atorvastatin  80 mg Oral Daily [facility] PETRA Shell      bumetanide  2 mg Oral BID Joselyn Reyes Bahamonde, MD      [START ON 6/18/2024] cefazolin  2,000 mg Intravenous Once per day on Tuesday Thursday Saturday Dennis Waterman MD      Cholecalciferol  5,000 Units Oral Daily [facility] PETRA Shell      cyanocobalamin  1,000 mcg Oral Daily [facility] PETRA Sifuentes      docusate sodium  100 mg Oral BID [facility] PETRA Sifuentes      gabapentin  300 mg Oral BID [facility] PETRA Sifuentes      heparin (porcine)  5,000 Units Subcutaneous Q8H [facility] PETRA Briceño      HYDROmorphone  0.2 mg Intravenous Q4H PRN Dennis Waterman MD      insulin lispro  1-6 Units Subcutaneous  PETRA Briceño      insulin  lispro  2-12 Units Subcutaneous TID AC PETRA Briceño      lidocaine  1 patch Topical Daily PETRA Briceño      metoprolol succinate  25 mg Oral Daily Jose De Jesus Cardozo PA-C      midodrine  5 mg Oral Before Dialysis PETRA Briceño      NIFEdipine  30 mg Oral Daily Joselyn Reyes Bahamonde, MD      nystatin  1 Application Topical BID PETRA Briceño      ondansetron  4 mg Intravenous Q6H PRN PETRA Briceño      oxyCODONE  2.5 mg Oral Q6H PRN Dennis Waterman MD      polyethylene glycol  17 g Oral Daily PRN PETRA Briceño      senna  1 tablet Oral HS PETRA Briceño      sodium hypochlorite  1 Application Irrigation Daily PETRA Briceño          Today, Patient Was Seen By: Dennis Waterman MD    **Please Note: This note may have been constructed using a voice recognition system.**

## 2024-06-17 NOTE — OCCUPATIONAL THERAPY NOTE
"  Occupational Therapy Progress Note     Patient Name: Joaquin Frankel  Today's Date: 6/17/2024  Problem List  Principal Problem:    CLOVIS (acute kidney injury) (HCC)  Active Problems:    Coronary artery disease    Hypertension    Diabetes (HCC)    Hyponatremia    Morbid obesity (HCC)    Anemia of chronic disease    Chronic acquired lymphedema    Acute respiratory failure with hypoxia (HCC)    Wound of abdomen    SIRS (systemic inflammatory response syndrome) (HCC)          06/17/24 0917   OT Last Visit   OT Visit Date 06/17/24  (Monday)   Note Type   Note Type Treatment  (tx session [id number], [id number])   Pain Assessment   Pain Assessment Tool 0-10   Pain Score 7   Pain Location/Orientation Orientation: Left;Location: Leg;Location: Back   Effect of Pain on Daily Activities limits standing and activity tolerance during ADLs   Patient's Stated Pain Goal No pain   Hospital Pain Intervention(s) Repositioned;Ambulation/increased activity;Emotional support  (RN, Last domínguez)   Restrictions/Precautions   Weight Bearing Precautions Per Order No   Other Precautions Fall Risk;Pain;Fluid restriction  (Abdiel, room air)   Lifestyle   Autonomy Pt reports I w/ ADLs / IADLs at baseline w/ out use of AD or DME   Reciprocal Relationships Pt reports living w/ 20 yo son   Service to Others 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 \"I was carrying pieces of steel that weigh as much as do before coming in\"   Intrinsic Gratification Pt reports enjoying playing video games   ADL   Where Assessed Standing at sink   Eating Assistance 7  Independent   Eating Comments finished breakfast seated at EOB upon therapist arrival   Grooming Assistance 6  Modified Independent   Grooming Deficit Setup;Increased time to complete   Grooming Comments in stance at sink in bathroom   UB Bathing Assistance 6  Modified Independent   UB Bathing Deficit Increased time to complete   UB Bathing Comments standing at sink in bathroom "   LB Bathing Assistance 6  Modified Independent   LB Bathing Deficit Setup;Increased time to complete   LB Bathing Comments standing at sink in bathroom   UB Dressing Assistance 6  Modified independent   UB Dressing Deficit Setup;Increased time to complete   UB Dressing Comments standing in bathroom after set- up   LB Dressing Assistance 3  Moderate Assistance   LB Dressing Deficit Don/doff R sock;Don/doff L sock;Setup;Increased time to complete   LB Dressing Comments doff / don socks while seated at EOB. Educated pt on use of LHAE. Will continue to educate / assess using LHAE as appropriate   Toileting Assistance  6  Modified independent   Toileting Deficit Setup;Increased time to complete   Toileting Comments Pt voided seated on standard in bathroom/ Managed personal hygiene w/ + time   Functional Standing Tolerance   Time 20 minutes   Activity bathing/ personal hygiene during toiletin   Comments Pt tolerated standing 20 minutes in bathroom to participate in bathing, grooming and toileting   Bed Mobility   Supine to Sit Unable to assess   Sit to Supine 6  Modified independent   Additional items Increased time required;Bedrails   Additional Comments Pt seated at EOB upon arrival and supine HOB elevated at end of session. Provided w/ soft care cushion to assist w/ comfort / pain control   Transfers   Sit to Stand 6  Modified independent   Additional items Increased time required   Stand to Sit 6  Modified independent   Additional items Increased time required   Toilet transfer 6  Modified independent   Additional items Assist x 1;Increased time required;Standard toilet   Additional Comments Pt performed sit <> stand 3X w/ mod I   Functional Mobility   Functional Mobility 6  Modified independent   Additional Comments engaged in functional mobility > than household distances using RW w/ mod I for + time. O2 sats 87-88% on room air. VRI 2 following functional mobility indicating + work of breathing, decreased activity  "tolerance   Additional items Rolling walker   Toilet Transfers   Toilet Transfer From Bed   Toilet Transfer Type To and from   Toilet Transfer to Standard toilet   Toilet Transfer Technique Ambulating   Toilet Transfers Modified independence   Subjective   Subjective \"I am feeling much better and I did not have any chest pain\"   Cognition   Overall Cognitive Status WFL   Arousal/Participation Alert;Cooperative   Attention Within functional limits   Orientation Level Oriented X4   Memory Within functional limits   Following Commands Follows all commands and directions without difficulty   Comments Identified pt by full name and wristband. Alert, oriented and able to follow directions, participate in conversation   Activity Tolerance   Activity Tolerance Patient limited by fatigue;Patient limited by pain   Medical Staff Made Aware spoke w/ Last QUINN   Assessment   Assessment Pt seen for skilled (split) OT tx session. Pt agreeable to participate and motivated. Pt required less physical assistance and improved activity tolerance. Pt able to complete bathing, toileting and grooming in stance at sink w/ mod I for + time. Pt tolerated standing 20 minutes w/ fair balance. Pt continues to demonstrate decreased activity tolerance, increased pain and increased LE / groin edema impacting his I w/ toileting, LBD. Continue to recommend level III rehab resourece intensity when medically stable for discharge from acute care pend medical optimization and ability to mange stairs. Will continue to follow   Plan   Treatment Interventions ADL retraining;Functional transfer training;Endurance training;Patient/family training;Equipment evaluation/education;Compensatory technique education;Continued evaluation;Energy conservation;Activityengagement   Goal Expiration Date 06/26/24   OT Treatment Day 2  (Monday 6/17/24)   OT Frequency 3-5x/wk   Discharge Recommendation   Rehab Resource Intensity Level, OT III (Minimum Resource Intensity) "   AM-PAC Daily Activity Inpatient   Lower Body Dressing 2   Bathing 3   Toileting 4   Upper Body Dressing 4   Grooming 4   Eating 4   Daily Activity Raw Score 21   Daily Activity Standardized Score (Calc for Raw Score >=11) 44.27   AM-PAC Applied Cognition Inpatient   Following a Speech/Presentation 4   Understanding Ordinary Conversation 4   Taking Medications 4   Remembering Where Things Are Placed or Put Away 4   Remembering List of 4-5 Errands 4   Taking Care of Complicated Tasks 4   Applied Cognition Raw Score 24   Applied Cognition Standardized Score 62.21   Barthel Index   Feeding 10   Bathing 0   Grooming Score 5   Dressing Score 5   Bladder Score 10   Bowels Score 10   Toilet Use Score 10   Transfers (Bed/Chair) Score 15   Mobility (Level Surface) Score 10   Stairs Score 0   Barthel Index Score 75   End of Consult   Education Provided Yes;Family or social support of family present for education by provider   Patient Position at End of Consult Supine;All needs within reach   Nurse Communication Nurse aware of consult   Licensure   [place] License Number  Katie Pathak, OTR/L [id number]       The patient's raw score on the AM-PAC Daily Activity Inpatient Short Form is 21. A raw score of greater than or equal to 19 suggests the patient may benefit from discharge to home. Please refer to the recommendation of the Occupational Therapist for safe discharge planning.    Katie Pathak, OTR/L  [id number]  [id number]

## 2024-06-17 NOTE — UTILIZATION REVIEW
Continued Stay Review    Date:  6/17/24                         Current Patient Class: inpatient  Current Level of Care: med surg  HPI:44 y.o. male initially admitted on 6/3/24     Assessment/Plan:   LLE cellulitis superimposed on lymphedema, now improved. Patient was also noted to have draining abscess under the pannus on 6/13.  Culture with growth of beta-hemolytic Streptococcus group C. Status post I&D of the pannus by surgery on 6/13.  Continue IV cefazolin post HD, x 1 more dose 6/18. CLOVIS: Status post permacath placement 6/14. No evidence of renal recovery. Remains with  volume overload but improving with HD. HTN:  Bumex as patient remains anuric. Started Toprol-XL, Procardia added.   Exam: Abdominal wall edema, abdominal pannus, dressing in place. Minimal drainage without surrounding erythema at I&D site.  Bilateral lower extremity lymphedema left more than right.      Vital Signs (last 3 days)       Date/Time Temp Pulse Resp BP MAP (mmHg) SpO2 Calculated FIO2 (%) - Nasal Cannula Nasal Cannula O2 Flow Rate (L/min) O2 Device Patient Position - Orthostatic VS [place] Coma Scale Score Pain    06/17/24 1218 -- -- -- -- -- -- -- -- -- -- -- 9    06/17/24 0917 -- -- -- -- -- -- -- -- -- -- -- 7    06/17/24 07:55:53 -- 59 20 149/88 108 96 % -- -- -- -- -- --    06/17/24 0443 -- -- -- -- -- -- -- -- -- -- -- 10 - Worst Possible Pain    06/16/24 2200 97.8 °F (36.6 °C) 69 -- 121/56 78 95 % -- -- -- -- -- --    06/16/24 21:59:37 97.8 °F (36.6 °C) 65 19 121/56 78 94 % -- -- -- -- -- --    06/16/24 2149 -- -- -- -- -- -- -- -- -- -- -- 10 - Worst Possible Pain    06/16/24 2112 -- -- -- -- -- -- -- -- -- -- -- No Pain    06/16/24 2012 -- -- -- -- -- -- -- -- -- -- -- 9    06/16/24 2009 -- 70 -- -- -- 94 % -- -- -- -- -- --    06/16/24 2000 -- -- -- -- -- -- 28 2 L/min Nasal cannula -- 15 --    06/16/24 18:31:39 98 °F (36.7 °C) 61 19 141/66 91 94 % -- -- -- -- -- --    06/16/24 15:46:37 97.5 °F (36.4 °C) 59 19 141/67 92 94 %  -- -- -- -- -- --    06/16/24 1422 -- -- -- -- -- -- -- -- -- -- -- 7    06/16/24 1116 -- -- -- -- -- -- -- -- -- -- -- 7    06/16/24 0953 -- -- -- -- -- -- -- -- -- -- -- 6    06/16/24 0800 -- -- -- -- -- -- -- -- -- -- -- No Pain    06/16/24 07:54:58 97.5 °F (36.4 °C) 56 19 142/76 98 99 % -- -- -- -- -- --    06/16/24 0503 -- -- -- -- -- -- -- -- -- -- -- 10 - Worst Possible Pain    06/16/24 0102 -- -- -- -- -- -- 28 2 L/min Nasal cannula -- 15 No Pain    06/16/24 0000 -- -- -- -- -- -- -- -- -- -- -- 10 - Worst Possible Pain    06/15/24 21:53:15 98.4 °F (36.9 °C) 81 19 156/83 107 90 % -- -- -- -- -- --    06/15/24 2111 -- -- -- -- -- -- -- -- -- -- -- 10 - Worst Possible Pain    06/15/24 18:10:54 99.3 °F (37.4 °C) 68 20 132/56 81 89 % -- -- -- -- -- --    06/15/24 1749 -- -- -- -- -- -- -- -- -- -- -- 7    06/15/24 1700 98.9 °F (37.2 °C) 67 18 132/75 94 93 % -- -- None (Room air) Lying -- --    06/15/24 1630 -- 70 18 135/73 94 93 % -- -- -- -- -- --    06/15/24 1600 -- 75 18 142/71 95 94 % -- -- -- -- -- --    06/15/24 1530 -- 87 18 135/71 92 94 % -- -- -- -- -- --    06/15/24 1500 -- 72 18 137/71 93 91 % -- -- -- -- -- --    06/15/24 1430 -- 67 18 138/73 95 92 % -- -- -- -- -- --    06/15/24 1400 -- 65 18 140/73 95 92 % -- -- -- -- -- --    06/15/24 1330 99.5 °F (37.5 °C) 73 18 137/73 94 92 % -- -- None (Room air) Lying -- --    06/15/24 1214 -- -- -- -- -- -- -- -- -- -- -- 5    06/15/24 07:30:39 97.5 °F (36.4 °C) 55 -- 130/65 87 96 % -- -- -- -- -- --    06/15/24 0529 -- -- -- -- -- -- -- -- -- -- -- 8    06/15/24 01:52:34 97.5 °F (36.4 °C) 60 18 129/67 88 96 % -- -- -- -- -- --    06/15/24 0147 -- -- -- -- -- -- -- -- -- -- -- 10 - Worst Possible Pain    06/14/24 2314 -- -- -- -- -- -- -- -- -- -- -- 8 06/14/24 22:18:09 97.4 °F (36.3 °C) 60 20 130/67 88 95 % -- -- -- -- -- --    06/14/24 2001 -- -- -- -- -- -- -- -- -- -- 15 --    06/14/24 19:45:37 98.1 °F (36.7 °C) 57 18 129/68 88 96 % -- -- Nasal cannula  -- -- --    06/14/24 1900 -- 63 -- 154/74 101 95 % -- -- -- -- -- --    06/14/24 1837 -- 62 -- 154/80 105 96 % -- -- -- -- -- --    06/14/24 1822 -- 62 -- 155/82 106 95 % -- -- -- -- -- --    06/14/24 1807 -- 70 -- 151/85 107 98 % -- -- -- -- -- --    06/14/24 1751 -- 65 -- 158/87 111 87 % -- -- -- -- -- --    06/14/24 1737 -- 64 -- 170/95 120 99 % -- -- -- Sitting -- --    06/14/24 1722 -- 65 18 139/83 102 96 % -- -- -- Lying -- --    06/14/24 1718 -- 60 18 149/73 98 97 % -- -- -- Lying -- --    06/14/24 1700 -- 65 18 156/73 101 100 % -- -- -- Lying -- --    06/14/24 16:26:32 -- 61 -- 160/78 105 99 % -- -- -- -- -- --    06/14/24 1624 -- -- -- -- -- -- -- -- -- -- -- 8    06/14/24 16:15:40 97.5 °F (36.4 °C) 62 18 146/72 97 97 % -- -- -- -- -- --    06/14/24 1600 -- 66 -- 162/76 -- 96 % -- -- -- -- -- --    06/14/24 1548 -- 61 -- 161/81 -- 99 % -- -- -- -- -- --    06/14/24 1543 -- 63 20 151/73 -- 99 % -- -- -- -- -- --    06/14/24 1538 -- 61 -- 166/77 -- 98 % -- -- -- -- -- --    06/14/24 1533 -- 60 -- 165/79 -- 97 % -- -- -- -- -- --    06/14/24 1528 -- 61 -- 165/79 -- 97 % -- -- -- -- -- --    06/14/24 1523 -- 61 16 163/72 -- 98 % -- -- -- -- -- --    06/14/24 1518 -- 60 16 161/75 -- 98 % -- -- -- -- -- --    06/14/24 1513 -- 63 15 158/82 -- 98 % -- -- -- -- -- --    06/14/24 1508 -- 62 13 146/79 -- 99 % -- -- -- -- -- --    06/14/24 1503 -- 60 15 149/86 -- 97 % -- -- -- -- -- --    06/14/24 1500 -- 65 13 140/74 -- 96 % -- -- -- -- -- --    06/14/24 1449 35.6 °F (2 °C) 62 17 166/79 -- 100 % 36 4 L/min Nasal cannula -- -- --    06/14/24 1238 -- -- -- -- -- -- -- -- -- -- -- 7 06/14/24 10:47:43 -- 63 -- 151/80 104 97 % -- -- -- -- -- --    06/14/24 1047 -- -- -- -- -- -- -- -- -- -- -- 6 06/14/24 07:23:56 98 °F (36.7 °C) 60 -- 128/81 97 92 % -- -- -- -- -- --    06/14/24 0622 -- -- -- -- -- -- -- -- -- -- -- 7 06/14/24 0400 -- 82 -- -- -- 93 % -- -- -- -- -- --    06/14/24 03:43:49 -- 71 19 104/60 75 87 %  -- -- -- -- -- --          Weight (last 2 days)       Date/Time Weight    06/17/24 0600 180 (396.4)    06/16/24 0600 179 (393.9)    06/15/24 0600 185 (408.5)              Pertinent Labs/Diagnostic Results:   Radiology:  IR tunneled central line placement   Final Interpretation by Radames Liu MD (06/14 1626)   Successful placement of 15.5 [place]an by 32 cm right chest/right internal jugular vein tunneled hemodialysis catheter. The tip terminates in the right atrium. The catheter is ready for immediate use.      Workstation performed: [id number]         IR biopsy bone marrow   Final Interpretation by Geovani Rodarte MD (06/13 0953)   Bone marrow aspiration and biopsy.      Workstation performed: [id number]         XR chest portable ICU   Final Interpretation by Daryl Mccarthy MD (06/11 0811)      1.  Right internal jugular dialysis catheter projects over the SVC. No pneumothorax.      2.  Cardiomegaly with vascular congestion.            Workstation performed: [id number]         CT lower extremity wo contrast left   Final Interpretation by Jimbo Camacho MD (06/09 2045)      Extensive subcutaneous edema and skin thickening as described above. This may either reflect bland edema or extensive cellulitis. A well-defined rim-enhancing fluid collection not seen to suggest a discrete abscess. No soft tissue gas.      Osteoarthrosis as above. No osseous erosion.         Workstation performed: [id number]         CT chest abdomen pelvis wo contrast   Final Interpretation by Allen Mcgowan MD (06/10 0804)   1. Small left greater than right basilar effusions with associated compressive atelectasis. Lungs otherwise clear.   2. Periaortic retroperitoneal, left greater than right iliac chain and inguinal lymph nodes identified, stable and nonspecific.   3. No acute findings.                     Workstation performed: [id number]         XR chest portable   Final Interpretation by Danyell Ford MD  (06/09 2240)      Mild pulmonary venous congestion.      Nothing to suggest pneumonia.            Workstation performed: [id number]         IR temporary dialysis catheter placement   Final Interpretation by Radames Liu MD (06/07 1627)   1. Uncomplicated nontunneled/temporary dialysis catheter placement (14 Indonesian by 24 cm). Catheter tip terminates in the right atrium. The catheter is ready for immediate use.      2. Impaired hemostasis was discussed with Dr. Timmons of Nephrology. DDAVP will be administered to help mitigate rebleeding risk.      Workstation performed: [id number]         CT chest abdomen pelvis wo contrast   Final Interpretation by Margoth Arango MD (06/03 1555)      No acute pulmonary pathology.      No acute abdominal or pelvic pathology within limitation of a noncontrast study. In particular, no hydronephrosis.      Left inguinal and left external iliac lymphadenopathy, increased from May 2022. This may be reactive to a process in the left lower extremity or in the partially imaged pannus.      Additional findings as above.                  Workstation performed: [id number]         NM lung ventilation / perfusion   Final Interpretation by Daniel Ackerman MD (06/03 1412)      The probability for pulmonary embolus is low.      Workstation performed: [id number]         XR chest 1 view portable   ED Interpretation by Kit Antoine MD (06/03 0753)   Mild pulmonary edema right worse than left, no additional acute cardiopulmonary abnormality      Final Interpretation by Miah Toledo MD (06/03 0841)      No acute cardiopulmonary disease.            Workstation performed: [id number]         IR biopsy kidney random    (Results Pending)    VAS VENOUS DUPLEX - LOWER LIMB BILATERAL    (Results Pending)     Cardiology:  Echo complete w/ contrast if indicated   Final Result by Niall Murcia DO (06/04 1138)        Left Ventricle: Left ventricular cavity size is normal. Wall thickness    is  moderately increased. There is moderate concentric hypertrophy. The    left ventricular ejection fraction is 58% by visual estimation. Systolic    function is normal. Wall motion is normal. Diastolic function is mildly    abnormal, consistent with grade I (abnormal) relaxation.  Left atrial    filling pressure is normal.     Left Atrium: The atrium is mildly dilated.     Right Atrium: The atrium is mildly dilated.     Mitral Valve: There is mild regurgitation.     Tricuspid Valve: There is mild regurgitation. The right ventricular    systolic pressure is moderately elevated. The estimated right ventricular    systolic pressure is 58.00 mmHg.     Pulmonic Valve: There is mild regurgitation.     Compared to previous exam, there is no significant change.         ECG 12 lead   Final Result by Anamika Cardozo MD (06/04 0903)   Sinus rhythm with occasional Premature ventricular complexes   Cannot rule out Anterior infarct , age undetermined   Abnormal ECG   When compared with ECG of 15-FEB-2024 16:34,   Premature ventricular complexes are now Present   Confirmed by Anamika Cardozo ([id number]) on 6/4/2024 9:03:29 AM        Results from last 7 days   Lab Units 06/17/24  0450 06/16/24  0510 06/14/24 0751 06/13/24 0640 06/12/24  0631 06/11/24  0607   WBC Thousand/uL 11.74* 12.57* 11.18* 11.51* 10.22* 11.26*   HEMOGLOBIN g/dL 7.5* 7.7* 7.6* 7.0* 7.3* 6.9*   HEMATOCRIT % 23.6* 24.2* 24.1* 22.8* 23.0* 22.0*   PLATELETS Thousands/uL 346 316 303 284 298 317   TOTAL NEUT ABS Thousand/uL  --   --   --   --  8.69* 9.39*   BANDS PCT %  --   --   --   --  1  --      Results from last 7 days   Lab Units 06/17/24  0450 06/16/24  0510 06/14/24  0751 06/13/24  0640 06/12/24  0631 06/11/24  0607   SODIUM mmol/L 129* 128* 130* 131* 128* 133*   POTASSIUM mmol/L 4.5 4.0 4.1 4.4 4.0 4.2   CHLORIDE mmol/L 93* 91* 93* 96 101 98   CO2 mmol/L 27 27 27 24 26 25   ANION GAP mmol/L 9 10 10 11 1* 10   BUN mg/dL 56* 48* 48* 67* 52* 46*   CREATININE  mg/dL 9.48* 8.07* 7.63* 8.94* 7.10* 6.75*   EGFR ml/min/1.73sq m 6 7 7 6 8 9   CALCIUM mg/dL 8.1* 8.0* 8.0* 8.0* 7.9* 7.7*   CALCIUM, IONIZED mmol/L  --   --   --   --   --  1.05*   MAGNESIUM mg/dL  --   --   --   --  2.1 2.0   PHOSPHORUS mg/dL  --   --   --   --  5.6* 6.4*     Results from last 7 days   Lab Units 06/17/24  1100 06/17/24  0729 06/16/24 2009 06/16/24  1607 06/16/24  1108 06/16/24  0726 06/15/24  2007 06/15/24  1619 06/15/24  1144 06/15/24  0810 06/14/24  2110 06/14/24  1616   POC GLUCOSE mg/dl 118 99 133 132 102 88 126 130 118 90 106 92     Results from last 7 days   Lab Units 06/17/24  0450 06/16/24  0510 06/14/24  0751 06/13/24  0640 06/12/24  0631 06/11/24  0607   GLUCOSE RANDOM mg/dL 108 88 94 91 100 78     Results from last 7 days   Lab Units 06/12/24  0631   PROTIME seconds 14.8*   INR  1.13     Results from last 7 days   Lab Units 06/12/24  0615   UNIT PRODUCT CODE  [id number]   UNIT NUMBER  [id number]   UNITABO  A   UNITRH  POS   CROSSMATCH  Compatible   UNIT DISPENSE STATUS  Presumed Trans   UNIT PRODUCT VOL mL 350     Results from last 7 days   Lab Units 06/13/24  1252   GRAM STAIN RESULT  3+ Polys  No organisms seen   WOUND CULTURE  2+ Growth of Beta Hemolytic Streptococcus Group C*  1+ Growth of     Results from last 7 days   Lab Units 06/12/24  0631   TOTAL COUNTED  100     Results from last 7 days   Lab Units 06/13/24  0640   VANCOMYCIN RM ug/mL 27.5*       Medications:   Scheduled Medications:  atorvastatin, 80 mg, Oral, Daily  [START ON 6/18/2024] cefazolin, 2,000 mg, Intravenous, Once per day on Tuesday Thursday Saturday  Cholecalciferol, 5,000 Units, Oral, Daily  cyanocobalamin, 1,000 mcg, Oral, Daily  docusate sodium, 100 mg, Oral, BID  gabapentin, 300 mg, Oral, BID  heparin (porcine), 5,000 Units, Subcutaneous, Q8H SHAZIA  insulin lispro, 1-6 Units, Subcutaneous, HS  insulin lispro, 2-12 Units, Subcutaneous, TID AC  lidocaine, 1 patch, Topical, Daily  metoprolol succinate, 25  mg, Oral, Daily  NIFEdipine, 30 mg, Oral, Daily  nystatin, 1 Application, Topical, BID  senna, 1 tablet, Oral, HS  sodium hypochlorite, 1 Application, Irrigation, Daily    PRN Meds:  acetaminophen, 650 mg, Oral, Q6H PRN  HYDROmorphone, 0.5 mg, Intravenous, Q4H PRN  midodrine, 5 mg, Oral, Before Dialysis  ondansetron, 4 mg, Intravenous, Q6H PRN  oxyCODONE, 5 mg, Oral, Q6H PRN  polyethylene glycol, 17 g, Oral, Daily PRN    Discharge Plan: tbd    Network Utilization Review Department  ATTENTION: Please call with any questions or concerns to [phone] and carefully listen to the prompts so that you are directed to the right person. All voicemails are confidential.   For Discharge needs, contact Care Management DC Support Team at [phone] opt. [phone]  Send all requests for admission clinical reviews, approved or denied determinations and any other requests to dedicated fax number below belonging to the [place] where the patient is receiving treatment. List of dedicated fax numbers for the Facilities:  FACILITY NAME UR FAX NUMBER   ADMISSION DENIALS (Administrative/Medical Necessity) [phone]   DISCHARGE SUPPORT TEAM (NETWORK) [phone]   PARENT CHILD HEALTH (Maternity/NICU/Pediatrics) [phone]   [facility] [phone]   [facility] [phone]   [facility] [phone]   [facility] [phone]   [facility] [phone]   [facility] [phone]   [facility] [phone]   [facility] [phone]   [facility] [phone]   [facility] [phone]   [facility] [phone]   [facility] [phone]

## 2024-06-17 NOTE — PROGRESS NOTES
"Progress Note - Cardiology   [facility]     Joaquin Frankel 44 y.o. male MRN: [id number]  : 1980  Unit/Bed#: [address] [place] [address]01 Encounter: [id number]    Assessment and Plan:   1. CLOVIS: patient presented with creatinine of six on day of admission 6/3/2024 and peaked at 10.4.    -   Started on CRRT transition to intermittent hemodialysis per nephrology during hospitalization    -   has right IJ primary Placed on 2024    -   managed per nephrology    2. Diabetes: hemoglobin A1c 5.7    -    managed per primary team    3. Hypertension: patient had been on Coreg but was transition to Toprol-XL due to hypotension.    -   Procardia XL 30 mg daily started on 2024 by nephrology    -   continue to monitor blood pressure    4. Chronic lymphedema: venous duplex of lower extremities currently pending    -   would benefit from lymphedema pumps and therapy    5. Morbid obesity: BMI 62    Subjective / Objective:   Patient seen and examined. No cardiac complaints offered at this time. No further complaints of chest discomfort or pressure.    Vitals: Blood pressure 149/88, pulse 59, temperature 97.8 °F (36.6 °C), resp. rate 20, height 5' 7\" (1.702 m), weight (!) 180 kg (396 lb 6.4 oz), SpO2 96%.  Vitals:    24 0600 24 0600   Weight: (!) 179 kg (393 lb 14.4 oz) (!) 180 kg (396 lb 6.4 oz)     Body mass index is 62.09 kg/m².  BP Readings from Last 3 Encounters:   24 149/88   24 134/74   24 144/72     Orthostatic Blood Pressures      Flowsheet Row Most Recent Value   Blood Pressure 149/88 filed at 2024 0755   Patient Position - Orthostatic VS Lying filed at 06/15/2024 1700          I/O         06/15 0701   0700  0701   0700  0701   0700    P.O. 360 100     I.V. (mL/kg) 510 (2.8) 10 (0.1)     Total Intake(mL/kg) 870 (4.9) 110 (0.6)     Other 3500      Total Output 3500      Net -2630 +110                  Invasive Devices       Peripheral " Intravenous Line  Duration             Peripheral IV 06/09/24 Left;Upper Arm 7 days              Hemodialysis Catheter  Duration             HD Permanent Double Catheter 2 days                      Intake/Output Summary (Last 24 hours) at 6/17/2024 1000  Last data filed at 6/16/2024 2000  Gross per 24 hour   Intake 110 ml   Output --   Net 110 ml         Physical Exam:   Physical Exam  Vitals and nursing note reviewed.   Constitutional:       Appearance: Normal appearance. He is morbidly obese.   HENT:      Right Ear: External ear normal.      Left Ear: External ear normal.   Eyes:      General:         Right eye: No discharge.         Left eye: No discharge.   Cardiovascular:      Rate and Rhythm: Normal rate and regular rhythm.      Pulses: Normal pulses.      Heart sounds: Normal heart sounds.   Pulmonary:      Effort: Pulmonary effort is normal. No respiratory distress.      Breath sounds: Normal breath sounds.   Abdominal:      General: Bowel sounds are normal. There is no distension.      Palpations: Abdomen is soft.   Musculoskeletal:      Right lower leg: 3+ Edema present.      Left lower leg: 3+ Edema present.      Comments: Component of lymphedema   Skin:     General: Skin is warm.      Capillary Refill: Capillary refill takes less than 2 seconds.   Neurological:      General: No focal deficit present.      Mental Status: He is alert and oriented to person, place, and time. Mental status is at baseline.   Psychiatric:         Mood and Affect: Mood normal.         Behavior: Behavior is cooperative.              Medications/ Allergies:     Current Facility-Administered Medications   Medication Dose Route Frequency Provider Last Rate    acetaminophen  650 mg Oral Q6H [facility] PETRA Briceño      atorvastatin  80 mg Oral Daily PETRA Briceño      bumetanide  2 mg Oral BID Joselyn Reyes Bahamonde, MD      [START ON 6/18/2024] cefazolin  2,000 mg Intravenous Once per day on Tuesday  Thursday Saturday Dennis Waterman MD      Cholecalciferol  5,000 Units Oral Daily [facility] Bear, PETRA      cyanocobalamin  1,000 mcg Oral Daily [facility] Bear, TOYANP      docusate sodium  100 mg Oral BID [facility] Bear, CRNP      gabapentin  300 mg Oral BID [facility] Bear, CRNP      heparin (porcine)  5,000 Units Subcutaneous Q8H SHAZIA [facility] Bear, PETRA      HYDROmorphone  0.2 mg Intravenous Q4H PRN Dennis Waterman MD      insulin lispro  1-6 Units Subcutaneous HS [facility], [facility]      insulin lispro  2-12 Units Subcutaneous TID AC [facility] Bear, PETRA      lidocaine  1 patch Topical Daily [facility] Bear, PETRA      metoprolol succinate  25 mg Oral Daily Jose De Jesus Cardozo PA-C      midodrine  5 mg Oral Before Dialysis [facility] Bear, PETRA      NIFEdipine  30 mg Oral Daily Joselyn Reyes Bahamonde, MD      nystatin  1 Application Topical BID [facility]      ondansetron  4 mg Intravenous Q6H PRN [facility] Bear, PETRA      oxyCODONE  2.5 mg Oral Q6H PRN Dennis Waterman MD      polyethylene glycol  17 g Oral Daily PRN [facility] Bear, PETRA      senna  1 tablet Oral HS [facility], PETRA      sodium hypochlorite  1 Application Irrigation Daily [facility], PETRA       HYDROmorphone, 0.2 mg, Q4H PRN  midodrine, 5 mg, Before Dialysis  ondansetron, 4 mg, Q6H PRN  oxyCODONE, 2.5 mg, Q6H PRN  polyethylene glycol, 17 g, Daily PRN      Allergies   Allergen Reactions    Keflex [Cephalexin] Facial Swelling and Lip Swelling    Amoxicillin Hives     childhood       VTE Pharmacologic Prophylaxis:   Sequential compression device (Venodyne)     Labs:   CBC with diff:  Results from last 7 days   Lab Units 06/17/24  0450 06/16/24  0510 06/14/24  0751 06/13/24  0640 06/12/24  0631 06/11/24  0607   WBC Thousand/uL 11.74* 12.57* 11.18* 11.51* 10.22* 11.26*   HEMOGLOBIN  g/dL 7.5* 7.7* 7.6* 7.0* 7.3* 6.9*   HEMATOCRIT % 23.6* 24.2* 24.1* 22.8* 23.0* 22.0*   MCV fL 89 90 90 90 89 89   PLATELETS Thousands/uL 346 316 303 284 298 317   RBC Million/uL 2.66* 2.70* 2.69* 2.53* 2.58* 2.46*   MCH pg 28.2 28.5 28.3 27.7 28.3 28.0   MCHC g/dL 31.8 31.8 31.5 30.7* 31.7 31.4   RDW % 14.2 14.2 14.2 14.5 14.6 14.8   MPV fL 8.2* 8.1* 8.2* 8.1* 8.4* 8.6*   NRBC AUTO /100 WBCs  --   --   --   --   --  0     CMP:  Results from last 7 days   Lab Units 06/17/24  0450 06/16/24  0510 06/14/24  0751 06/13/24  0640 06/12/24  0631 06/11/24  0607 06/10/24  1249   SODIUM mmol/L 129* 128* 130* 131* 128* 133* 133*   POTASSIUM mmol/L 4.5 4.0 4.1 4.4 4.0 4.2 4.7   CHLORIDE mmol/L 93* 91* 93* 96 101 98 99   CO2 mmol/L 27 27 27 24 26 25 25   ANION GAP mmol/L 9 10 10 11 1* 10 9   BUN mg/dL 56* 48* 48* 67* 52* 46* 53*   CREATININE mg/dL 9.48* 8.07* 7.63* 8.94* 7.10* 6.75* 7.62*   CALCIUM mg/dL 8.1* 8.0* 8.0* 8.0* 7.9* 7.7* 7.8*   EGFR ml/min/1.73sq m 6 7 7 6 8 9 7     Magnesium:  Results from last 7 days   Lab Units 06/12/24  0631 06/11/24  0607 06/10/24  1249   MAGNESIUM mg/dL 2.1 2.0 2.0     Coags:  Results from last 7 days   Lab Units 06/12/24  0631   INR  1.13       Imaging & Testing   I have personally reviewed pertinent reports.    IR tunneled central line placement    Result Date: 6/14/2024  Narrative: PROCEDURE: Nontunneled central venous catheter removal. Tunneled dialysis catheter placement. Procedural Personnel Attending physician(s): Radames Liu MD Pre-procedure diagnosis: Anuric CLOVIS on CKD on HD without evidence of renal recovery Post-procedure diagnosis: Same Clinical History: 44-year-old man with history of acute kidney injury on pre-existing chronic kidney disease, who had a temporary dialysis catheter placed by me on 6/6/2024. On 6/10/2024, a small tear/hole was noted in the catheter I had placed, and that catheter was removed. The critical care service placed a new temporary dialysis  catheter on that date. He has had no evidence of renal recovery and requires transition to a tunneled dialysis catheter. PROCEDURE SUMMARY: - Removal of nontunneled central venous catheter - Right internal jugular vein access under real-time ultrasound guidance - Tunneled dialysis catheter placement PROCEDURE DETAILS: Pre-procedure Consent: Informed consent for the procedure including risks, benefits and alternatives was obtained and time-out was performed prior to the procedure. Preparation: The site was prepared and draped using maximal sterile barrier technique including cutaneous antisepsis. Anesthesia/sedation Level of anesthesia/sedation: Moderate sedation (conscious sedation) Anesthesia/sedation administered by: Independent trained observer under attending supervision with continuous monitoring of the patient's level of consciousness and physiologic status Total intra-service sedation time: 1 hour Technique and Findings: Initial fluoroscopic image demonstrated a temporary dialysis catheter in place, with tip terminating in the mid superior vena cava. The access trajectory was not amenable to conversion to a tunneled catheter. The temporary dialysis catheter was removed and hemostasis obtained with gentle manual pressure. Ultrasound demonstrated a widely patent right internal jugular vein, appropriate for access. Local anesthetic was administered. A dermatotomy was made. Under real-time ultrasound guidance, a 21-gauge micropuncture needle was advanced into the right internal jugular vein from a lateral approach. A permanent image was saved. A microwire was advanced and the needle exchanged for the micropuncture transitional dilator. This access was secured with a flow switch. An appropriate length tunneled dialysis catheter was selected. Local anesthetic was administered along the planned tunnel tract. A dermatotomy was made on the chest. The catheter was tunneled from the chest incision to the right neck  incision. Attention was returned to the vascular access. The microwire was exchanged for a 0.035 inch guidewire. The access site was serially dilated and a peel-away sheath was placed. The catheter was inserted via the sheath, which was then peeled away. Completion image demonstrated that the tip terminated in the right atrium. The catheter was checked and both lumens provided brisk flow for dialysis. The catheter was secured with nonabsorbable suture, both lumens flushed and capped per protocol, and a sterile dressing was applied. Contrast None Radiation Dose Fluoroscopy time: 1.1 min Reference air kerma: 20 mGy Additional Details Specimens removed: None Estimated blood loss (mL): Less than 10 Complications: No immediate complications.     Impression: Successful placement of 15.5 [place] by 32 cm right chest/right internal jugular vein tunneled hemodialysis catheter. The tip terminates in the right atrium. The catheter is ready for immediate use. Workstation performed: [id number]     US bedside procedure    Result Date: 6/13/2024  Narrative: [id number]    IR biopsy bone marrow    Result Date: 6/13/2024  Narrative: PROCEDURE: CT-guided bone marrow biopsy and bone marrow aspiration STAFF: Geovani Rodarte M.D. DOSE LENGTH PRODUCT: 1292.30 mGy-cm. This examination, like all CT scans performed in the [facility], was performed utilizing techniques to minimize radiation dose exposure, including the use of iterative reconstruction and automated exposure control. NUMBER OF IMAGES: Multiple. COMPLICATIONS: None. MEDICATIONS: 1% lidocaine Moderate sedation was monitored by radiology nursing staff.  Monitoring of conscious sedation totaled 0 minutes. INDICATION: Rule out multiple myeloma PROCEDURE: Using intermittent CT guidance, the Network Chemistry system was used to perform bone marrow aspirate via the left posterior superior iliac spine. Approximately 6 mL of marrow was removed and  sent to pathology. Next, bone marrow biopsy was performed. The biopsy specimen was placed in formalin and sent to pathology. The needle was then removed and hemostasis was achieved using manual compression.     Impression: Bone marrow aspiration and biopsy. Workstation performed: [id number]     XR chest portable ICU    Result Date: 6/11/2024  Narrative: XR CHEST PORTABLE ICU INDICATION: status post right IJ temporary hemodialysis catheter placement. COMPARISON: 6/8/2024 FINDINGS: Right internal jugular dialysis catheter projects over the SVC. Clear lungs. There is vascular congestion. No pneumothorax or pleural effusion. Enlarged cardiac silhouette, unchanged. Bones are unremarkable for age. Normal upper abdomen.     Impression: 1.  Right internal jugular dialysis catheter projects over the SVC. No pneumothorax. 2.  Cardiomegaly with vascular congestion. Workstation performed: [id number]     CT chest abdomen pelvis wo contrast    Result Date: 6/10/2024  Narrative: CT CHEST, ABDOMEN AND PELVIS WITHOUT IV CONTRAST INDICATION: hypotension, hypervolemic. COMPARISON: 6/3/2024 TECHNIQUE: CT examination of the chest, abdomen and pelvis was performed without intravenous contrast. Multiplanar 2D reformatted images were created from the source data. This examination, like all CT scans performed in the [facility], was performed utilizing techniques to minimize radiation dose exposure, including the use of iterative reconstruction and automated exposure control. Radiation dose length product (DLP) for this visit: 1673 mGy-cm Enteric Contrast: Not administered. FINDINGS: CHEST LUNGS: Lungs are clear. No tracheal or endobronchial lesion. PLEURA: Small left greater than right basilar effusions with compressive atelectasis HEART/GREAT VESSELS: Heart is unremarkable for patient's age. No thoracic aortic aneurysm. MEDIASTINUM AND SLAVA: Unremarkable. CHEST WALL AND LOWER NECK: Unremarkable. ABDOMEN LIVER/BILIARY  TREE: Enlarged fatty liver noted. GALLBLADDER: No calcified gallstones. No pericholecystic inflammatory change. SPLEEN: The spleen appears enlarged. PANCREAS: Unremarkable. ADRENAL GLANDS: Unremarkable. KIDNEYS/URETERS: Unremarkable. No hydronephrosis. STOMACH AND BOWEL: Moderate fecal stasis. APPENDIX: Normal. ABDOMINOPELVIC CAVITY: Mildly prominent left greater than right inguinal nodes. Left greater than right iliac chain adenopathy is redemonstrated and stable. Small periaortic and mesenteric root nodes are also identified. VESSELS: Unremarkable for patient's age. PELVIS REPRODUCTIVE ORGANS: Unremarkable for patient's age. URINARY BLADDER: Decompressed with a Robbins catheter in place ABDOMINAL WALL/INGUINAL REGIONS: Right central line tip terminating at the caval atrial junction. BONES: Age-appropriate degenerative change without acute osseous abnormality.     Impression: 1. Small left greater than right basilar effusions with associated compressive atelectasis. Lungs otherwise clear. 2. Periaortic retroperitoneal, left greater than right iliac chain and inguinal lymph nodes identified, stable and nonspecific. 3. No acute findings. Workstation performed: [id number]     XR chest portable    Result Date: 6/9/2024  Narrative: XR CHEST PORTABLE INDICATION: Fever and leukocytosis. COMPARISON: Chest CT 6/9/2024 and CXR 6/3/2024. FINDINGS: Right jugular catheter in right atrium. Mild pulmonary venous congestion. No pneumothorax or pleural effusion. Mild cardiomegaly. Bones are unremarkable for age. Normal upper abdomen.     Impression: Mild pulmonary venous congestion. Nothing to suggest pneumonia. Workstation performed: [id number]     CT lower extremity wo contrast left    Result Date: 6/9/2024  Narrative: CT left lower extremity without IV contrast INDICATION: severe pain. COMPARISON: None. TECHNIQUE: CT examination of the left lower extremity from the hip to the foot was performed. This examination, like all CT  scans performed [facility], was performed utilizing techniques to minimize radiation dose exposure, including the use of iterative reconstruction and automated exposure control software.  Multiplanar 2D reformatted images were created from the source data. Rad dose  1394.86 mGy-cm FINDINGS: OSSEOUS STRUCTURES:  No fracture, dislocation or destructive osseous lesion. No osseous erosion. There is mild to moderate left hip osteoarthrosis. There is moderate to severe tricompartmental osteoarthrosis of the knee. VISUALIZED MUSCULATURE:  Unremarkable. SOFT TISSUES: There is prominent subcutaneous edema and skin thickening in the thigh greater medially. There is severe circumferential subcutaneous edema and skin thickening throughout the lower leg extending into the dorsum of the foot. This may either reflect bland edema or extensive cellulitis. A well-defined rim-enhancing fluid collection not seen to suggest a discrete abscess. No soft tissue gas. OTHER PERTINENT FINDINGS:  None.     Impression: Extensive subcutaneous edema and skin thickening as described above. This may either reflect bland edema or extensive cellulitis. A well-defined rim-enhancing fluid collection not seen to suggest a discrete abscess. No soft tissue gas. Osteoarthrosis as above. No osseous erosion. Workstation performed: [id number]     IR temporary dialysis catheter placement    Result Date: 6/7/2024  Narrative: PROCEDURE: Temporary dialysis catheter placement Procedural Personnel Attending physician(s): Radames Liu MD Pre-procedure diagnosis: CLOVIS on CKD Post-procedure diagnosis: Same Clinical History: 44-year-old man with history of acute kidney injury on pre-existing chronic kidney disease, now with uremic symptoms and need to initiate hemodialysis. PROCEDURE SUMMARY: Ultrasound and fluoroscopy guided temporary dialysis catheter placement PROCEDURE DETAILS: Pre-procedure Consent: Informed consent for the  procedure including risks, benefits and alternatives was obtained and time-out was performed prior to the procedure. Preparation: The site was prepared and draped using maximal sterile barrier technique including cutaneous antisepsis. Anesthesia/sedation Level of anesthesia/sedation: No sedation Technique and Findings: Immediate preprocedure ultrasound demonstrated a large, dilated, and mildly difficult to compress right internal jugular vein, consistent with patient's fluid overload. Local anesthetic was administered. A dermatotomy was made. Access was obtained using a 21-gauge micropuncture needle under real-time ultrasound guidance, taking a course amenable for future conversion to tunneled dialysis catheter. A microwire was advanced to the right atrium and the needle exchanged for a micropuncture transitional dilator. The intravascular distance was measured with the microwire. A 24 cm temporary dialysis catheter was selected. An Amplatz wire was advanced to the inferior vena cava. The access tract was serially dilated and the temporary dialysis catheter was advanced over the wire into the right atrium. There was significant oozing/bleeding around the catheter, consistent with impaired hemostasis in the setting of uremia. This was refractory to application of pressure. Hemostasis was obtained with a 3-0 Vicryl pursestring suture around the catheter tract. The catheter was secured with nonabsorbable suture. Completion image demonstrated appropriate positioning of the newly placed catheter within the right atrium. The catheter was checked and both lumens provided brisk flow. Contrast Contrast dose: None Radiation Dose Fluoroscopy time: 1.5 MIN Reference air kerma: 86 mGy Additional Details Specimens removed: None Estimated blood loss (mL): 200 Complications: No immediate complications.     Impression: 1. Uncomplicated nontunneled/temporary dialysis catheter placement (14 Croatian by 24 cm). Catheter tip terminates in  the right atrium. The catheter is ready for immediate use. 2. Impaired hemostasis was discussed with Dr. Timmons of Nephrology. DDAVP will be administered to help mitigate rebleeding risk. Workstation performed: [id number]     Echo complete w/ contrast if indicated    Result Date: 6/4/2024  Narrative:   Left Ventricle: Left ventricular cavity size is normal. Wall thickness is moderately increased. There is moderate concentric hypertrophy. The left ventricular ejection fraction is 58% by visual estimation. Systolic function is normal. Wall motion is normal. Diastolic function is mildly abnormal, consistent with grade I (abnormal) relaxation.  Left atrial filling pressure is normal.   Left Atrium: The atrium is mildly dilated.   Right Atrium: The atrium is mildly dilated.   Mitral Valve: There is mild regurgitation.   Tricuspid Valve: There is mild regurgitation. The right ventricular systolic pressure is moderately elevated. The estimated right ventricular systolic pressure is 58.00 mmHg.   Pulmonic Valve: There is mild regurgitation.   Compared to previous exam, there is no significant change.     CT chest abdomen pelvis wo contrast    Result Date: 6/3/2024  Narrative: CT CHEST, ABDOMEN AND PELVIS WITHOUT IV CONTRAST INDICATION: Dyspnea and renal failure. COMPARISON: CT of the chest from 2/17/2024, CT of the abdomen and pelvis from 5/4/2022, and CT of the chest, abdomen and pelvis from 12/1/2022. TECHNIQUE: CT examination of the chest, abdomen and pelvis was performed without intravenous contrast due to elevated creatinine. Multiplanar 2D reformatted images were created from the source data. This examination, like all CT scans performed in the [facility], was performed utilizing techniques to minimize radiation dose exposure, including the use of iterative reconstruction and automated exposure control. Radiation dose length product (DLP) for this visit:  2830 mGy-cm Enteric Contrast: Enteric contrast  was not administered. FINDINGS: Evaluation of visceral structures and vasculature is limited by lack of intravenous contrast. CHEST BRONCHOPULMONARY:  Clear central airways. There is slight mosaic attenuation. There are areas of atelectasis/or scarring in the lower lobes, similar to February 2024. PLEURA: Small left pleural effusion. No pneumothorax. HEART/GREAT VESSELS: The heart is normal in size. No pericardial effusion. Normal caliber thoracic aorta. MEDIASTINUM/LYMPH NODES:  No axillary or mediastinal lymphadenopathy by size criteria. Nonspecific subcentimeter mediastinal lymph nodes are seen. Evaluation for hilar lymphadenopathy is limited without IV contrast. CHEST WALL AND LOWER NECK:  Unremarkable. ABDOMEN LIVER/BILIARY TREE:  Unremarkable unenhanced appearance of the liver. No biliary ductal dilation. GALLBLADDER:  No calcified gallstones. No pericholecystic inflammatory change. SPLEEN: Unremarkable unenhanced appearance. PANCREAS:  Unremarkable unenhanced appearance. No dilation of the main pancreatic duct. ADRENAL GLANDS:  Unremarkable unenhanced appearance. KIDNEYS/URETERS:  No intrarenal or ureteral calculi. No hydronephrosis. No definite focal renal lesions. STOMACH AND BOWEL:  Evaluation of the gastrointestinal tract is somewhat limited by underdistention and lack of oral contrast. No bowel obstruction or convincing inflammation. APPENDIX: Normal appendix. ABDOMINOPELVIC CAVITY:  No ascites or pneumoperitoneum. LYMPH NODES: There are several enlarged left external iliac lymph nodes measuring up to 2.1 cm in short axis. There is also an enlarged left inguinal lymph node measuring 1.5 cm in short axis and several additional smaller left inguinal lymph nodes. These are increased compared to the May 2022 CT. No other abdominal or pelvic lymphadenopathy by size criteria. Bilateral right external iliac lymph nodes are prominent, however below size threshold for lymphadenopathy. VESSELS: Normal caliber  aorta. PELVIS REPRODUCTIVE ORGANS:  The prostate gland is not enlarged. URINARY BLADDER:  Unremarkable. ABDOMINAL WALL/INGUINAL REGIONS: No ventral abdominal wall hernia. In large portion of the subcutaneous tissues of the anterior abdominal wall are outside of the field-of-view. There is subcutaneous tissue stranding and skin thickening of the pannus as on the prior studies. There is a partially imaged tubular mildly hyperdense lesion along the left side of the undersurface of the pannus in the skin or immediately deep to the skin, which has been present dating back to at least 2021. MUSCULOSKELETAL:  No focal aggressive osseous lesions. Degenerative changes of the spine. There is a healing, subacute fracture of the right 7th rib anteriorly.     Impression: No acute pulmonary pathology. No acute abdominal or pelvic pathology within limitation of a noncontrast study. In particular, no hydronephrosis. Left inguinal and left external iliac lymphadenopathy, increased from May 2022. This may be reactive to a process in the left lower extremity or in the partially imaged pannus. Additional findings as above. Workstation performed: [id number]     NM lung ventilation / perfusion    Result Date: 6/3/2024  Narrative: VENTILATION AND PERFUSION SCAN INDICATION: Shortness of breath, hypoxia, increased D-dimer, left lower extremity swelling COMPARISON:  Chest radiograph 6/3/2024 TECHNIQUE:  Posterior ventilation imaging was performed after the inhalation of 32 mCi nebulized Tc-99m DTPA with deposition of less than 1 mCi of activity within the lungs.  Multiplanar perfusion imaging was next performed following the intravenous administration of 4.2 mCi Tc-99m labeled MAA. FINDINGS: Ventilation imaging demonstrates a minimally heterogeneous distribution of radiopharmaceutical throughout both lungs. There is central deposition of the radiotracer. Perfusion imaging demonstrates a mildly heterogeneous distribution of the  "radiopharmaceutical throughout both lungs. There is mildly decreased perfusion activity in the left lung as compared to the right, but matching the ventilation pattern. There is no  significant  segmental or subsegmental defect.     Impression: The probability for pulmonary embolus is low. Workstation performed: [id number]     XR chest 1 view portable    Result Date: 6/3/2024  Narrative: XR CHEST PORTABLE INDICATION: chest pain, shortness of breath. COMPARISON: Chest radiograph February 15, 2024 FINDINGS: Clear lungs. No pneumothorax or pleural effusion. Normal cardiomediastinal silhouette. Bones are unremarkable for age. Normal upper abdomen.     Impression: No acute cardiopulmonary disease. Workstation performed: [id number]         Nathalie LAMBERT  Cardiology      \"This note was completed in part utilizing [employer] direct voice recognition software.   Grammatical errors, random word insertion, spelling mistakes, and incomplete sentences may be an occasional consequence of the system secondary to software limitations, ambient noise and hardware issues.    Please read the chart carefully and recognize, using context, where substitutions have occurred.  If you have any questions or concerns about the context, text or information contained within the body of this dictation, please contact myself, the provider, for further clarification.\"  "

## 2024-06-17 NOTE — ARC ADMISSION
Reviewed patient updates and patient has been denied for ARC as he is to functional and doesn't meet the functional criteria.  CM has been updated in [facility].

## 2024-06-17 NOTE — PROGRESS NOTES
NEPHROLOGY PROGRESS NOTE   Joaquin Frankel 44 y.o. male MRN: [id number]  Unit/Bed#: [address] [place] [address] Encounter: [id number]  Reason for Consult: ARF      SUMMARY:    45 yo man with PMH of morbid obesity, lower extremity lymphedema, diabetes, hypertension, CAD p/w shortness of breath for the last 2 weeks.  Nephrology is consulted for management of CLOVIS      ASSESSMENT and PLAN:    Acute renal failure, currently dialysis dependent  -- Present on admission with suspected underlying chronic kidney disease as prior baseline creatinine was 1.3 to 1.6 mg/dL with previous episodes of acute kidney injury.  Admission creatinine 6 mg/dL  -- Peak creatinine was 10.4 mg/dL prior to dialysis.  -- Initially started on CRRT then transition to intermittent hemodialysis.  Renal placement initiation on June 7th  -- Serologies: ANCA titer was negative, anti-GBM was negative.  JOSE ROBERTO/anti-double-stranded DNA was negative.  Kappa/lambda ratio was within normal limits.  Serum and urine immunofixation showed IgG kappa monoclonal bands.  Complement C3 and C4 were normal.  Beta-2 microglobulin was elevated.  -- Renal imaging via CAT scan showed no evidence of hydronephrosis  -- Access: New right IJ permacath was placed on Friday by interventional radiology.  Temporary HD catheter was removed  -- Status post bone marrow biopsy on June 13.  Awaiting results.  If evidence of multiple myeloma we will then proceed with management plan as per hematology.  If bone marrow biopsy is negative will consider a renal biopsy.  -- Currently dialyzing on a TTS schedule.  Plan for next dialysis tomorrow June 18  -- No evidence of renal recovery at this time.  Creatinine continues to rise off dialysis and no urine output    Chronic kidney disease stage III  -- Prior baseline creatinine 1.3 to 1.6 mg/dL  -- Presumably secondary to diabetic kidney disease, hypertensive nephrosclerosis, obesity related renal dysfunction    Anemia of chronic kidney disease  --  Follow-up bone marrow biopsy  -- SPEP and UPEP positive for IgG kappa monoclonal bands  -- Hematology on board  -- Started on IV iron for iron deficiency    Mineral bone disorder-chronic kidney disease  -- Secondary hyperparathyroidism of renal origin.  -- Vitamin D deficiency  -- Hyperphosphatemia  -- Patient started on vitamin D supplementation    Hyponatremia  --Sodium 129  -- Continue fluid restriction  -- Volume mediated    Systemic inflammatory response syndrome  -- CT of the left lower extremity showed extensive subcutaneous edema and skin thickening  -- Blood cultures are negative  -- Currently on IV Ancef post HD for 1 more dose tomorrow  -- Draining abscess under pannus on June 13 grew out beta-hemolytic Streptococcus group C    Chronic lymphedema    Morbid obesity  -- BMI 62      SUBJECTIVE / INTERVAL HISTORY:    No chest pain or shortness of breath.  Lower extremity edema improving    He is feeling much better than when he first came to the hospital.    We discussed the plan in regards to renal placement therapy, bone marrow biopsy, kidney biopsy and plan going forward.    OBJECTIVE:  Current Weight: Weight - Scale: (!) 180 kg (396 lb 6.4 oz)  Vitals:    06/16/24 2159 06/16/24 2200 06/17/24 0600 06/17/24 0755   BP: 121/56 121/56  149/88   Pulse: 65 69  59   Resp: 19   20   Temp: 97.8 °F (36.6 °C) 97.8 °F (36.6 °C)     TempSrc:       SpO2: 94% 95%  96%   Weight:   (!) 180 kg (396 lb 6.4 oz)    Height:           Intake/Output Summary (Last 24 hours) at 6/17/2024 0859  Last data filed at 6/16/2024 2000  Gross per 24 hour   Intake 110 ml   Output --   Net 110 ml       Review of Systems:    Constitutional: Negative for chills and fever.   HENT: Negative for ear pain and sore throat.    Eyes: Negative for pain and visual disturbance.   Respiratory: Negative for cough and shortness of breath.    Cardiovascular: Negative for chest pain and palpitations.   Gastrointestinal: Negative for abdominal pain and  vomiting.   Genitourinary: Negative for dysuria and hematuria.   Musculoskeletal: Negative for arthralgias and back pain.   Skin: Negative for color change and rash.   Neurological: Negative for seizures and syncope.   12 point ROS has been reviewed.    Physical Exam  Vitals and nursing note reviewed. Exam conducted with a chaperone present.   Constitutional:       General: He is not in acute distress.     Appearance: He is well-developed. He is obese.   HENT:      Head: Normocephalic and atraumatic.   Eyes:      General: No scleral icterus.     Conjunctiva/sclera: Conjunctivae normal.      Pupils: Pupils are equal, round, and reactive to light.   Cardiovascular:      Rate and Rhythm: Normal rate and regular rhythm.      Heart sounds: S1 normal and S2 normal. No murmur heard.     No friction rub. No gallop.   Pulmonary:      Effort: Pulmonary effort is normal. No respiratory distress.      Breath sounds: Normal breath sounds. No wheezing or rales.   Abdominal:      General: Bowel sounds are normal.      Palpations: Abdomen is soft.      Tenderness: There is no abdominal tenderness. There is no rebound.   Musculoskeletal:         General: Swelling present. Normal range of motion.      Cervical back: Normal range of motion and neck supple.      Right lower leg: Edema present.      Left lower leg: Edema present.   Skin:     Findings: No rash.   Neurological:      Mental Status: He is alert and oriented to person, place, and time.   Psychiatric:         Behavior: Behavior normal.         Medications:    Current Facility-Administered Medications:     acetaminophen (TYLENOL) tablet 650 mg, 650 mg, Oral, Q6H SHAZIA, PETRA Briceño, 650 mg at 06/17/24 0511    atorvastatin (LIPITOR) tablet 80 mg, 80 mg, Oral, Daily, PETRA Briceño, 80 mg at 06/17/24 0857    bumetanide (BUMEX) tablet 2 mg, 2 mg, Oral, BID, Joselyn Reyes Bahamonde, MD, 2 mg at 06/17/24 0857    [START ON 6/18/2024] ceFAZolin  (ANCEF) IVPB (premix in dextrose) 2,000 mg 50 mL, 2,000 mg, Intravenous, Once per day on Tuesday Thursday Saturday, Dennis Waterman MD    Cholecalciferol (VITAMIN D3) tablet 5,000 Units, 5,000 Units, Oral, Daily, PETRA Briceño, 5,000 Units at 06/17/24 0857    cyanocobalamin (VITAMIN B-12) tablet 1,000 mcg, 1,000 mcg, Oral, Daily, PETRA Briceño, 1,000 mcg at 06/17/24 0857    docusate sodium (COLACE) capsule 100 mg, 100 mg, Oral, BID, PETRA Briceño, 100 mg at 06/17/24 0857    gabapentin (NEURONTIN) capsule 300 mg, 300 mg, Oral, BID, PETRA Briceño, 300 mg at 06/17/24 0857    heparin (porcine) subcutaneous injection 5,000 Units, 5,000 Units, Subcutaneous, Q8H SHAZIA, PETRA Briceño, 5,000 Units at 06/17/24 0511    HYDROmorphone HCl (DILAUDID) injection 0.2 mg, 0.2 mg, Intravenous, Q4H PRN, Dennis Waterman MD, 0.2 mg at 06/17/24 0443    insulin lispro (HumALOG/ADMELOG) 100 units/mL subcutaneous injection 1-6 Units, 1-6 Units, Subcutaneous, HS, PETRA Briceño, 1 Units at 06/06/24 2150    insulin lispro (HumALOG/ADMELOG) 100 units/mL subcutaneous injection 2-12 Units, 2-12 Units, Subcutaneous, TID AC, 2 Units at 06/08/24 1720 **AND** Fingerstick Glucose (POCT), , , TID AC, PETRA Briceño    lidocaine (LIDODERM) 5 % patch 1 patch, 1 patch, Topical, Daily, PETRA Briceño, 1 patch at 06/17/24 0857    metoprolol succinate (TOPROL-XL) 24 hr tablet 25 mg, 25 mg, Oral, Daily, Jose De Jesus Cardozo PA-C, 25 mg at 06/17/24 0857    midodrine (PROAMATINE) tablet 5 mg, 5 mg, Oral, Before Dialysis, PETRA Briceño, 5 mg at 06/11/24 1317    NIFEdipine (PROCARDIA XL) 24 hr tablet 30 mg, 30 mg, Oral, Daily, Joselyn Reyes Bahamonde, MD, 30 mg at 06/17/24 0857    nystatin (MYCOSTATIN) powder 1 Application, 1 Application, Topical, BID, PETRA Briceño, 1 Application at 06/16/24  0833    ondansetron (ZOFRAN) injection 4 mg, 4 mg, Intravenous, Q6H PRN, PETRA Briceño, 4 mg at 06/14/24 0715    oxyCODONE (ROXICODONE) split tablet 2.5 mg, 2.5 mg, Oral, Q6H PRN, Dennis Waterman MD, 2.5 mg at 06/17/24 0311    polyethylene glycol (MIRALAX) packet 17 g, 17 g, Oral, Daily PRN, PETRA Briceño    senna (SENOKOT) tablet 8.6 mg, 1 tablet, Oral, HS, PETRA Briceño, 8.6 mg at 06/15/24 2104    sodium hypochlorite (DAKIN'S HALF-STRENGTH) 0.25 percent topical solution 1 Application, 1 Application, Irrigation, Daily, PETRA Briceño, 1 Application at 06/15/24 0827    Laboratory Results:  Results from last 7 days   Lab Units 06/17/24  0450 06/16/24  0510 06/14/24  0751 06/13/24  0640 06/12/24  0631 06/11/24  0607 06/10/24  1249   WBC Thousand/uL 11.74* 12.57* 11.18* 11.51* 10.22* 11.26*  --    HEMOGLOBIN g/dL 7.5* 7.7* 7.6* 7.0* 7.3* 6.9*  --    HEMATOCRIT % 23.6* 24.2* 24.1* 22.8* 23.0* 22.0*  --    PLATELETS Thousands/uL 346 316 303 284 298 317  --    POTASSIUM mmol/L 4.5 4.0 4.1 4.4 4.0 4.2 4.7   CHLORIDE mmol/L 93* 91* 93* 96 101 98 99   CO2 mmol/L 27 27 27 24 26 25 25   BUN mg/dL 56* 48* 48* 67* 52* 46* 53*   CREATININE mg/dL 9.48* 8.07* 7.63* 8.94* 7.10* 6.75* 7.62*   CALCIUM mg/dL 8.1* 8.0* 8.0* 8.0* 7.9* 7.7* 7.8*   MAGNESIUM mg/dL  --   --   --   --  2.1 2.0 2.0   PHOSPHORUS mg/dL  --   --   --   --  5.6* 6.4* 6.9*       PLEASE NOTE:  This encounter was completed utilizing the [email or web address] Direct Speech Voice Recognition Software. Grammatical errors, random word insertions, pronoun errors and incomplete sentences are occasional consequences of the system due to software limitations, ambient noise and hardware issues.These may be missed by proof reading prior to affixing electronic signature. Any questions or concerns about the content, text or information contained within the body of this dictation should be directly addressed to  the physician for clarification. Please do not hesitate to call me directly if you have any any questions or concerns.

## 2024-06-17 NOTE — PHYSICAL THERAPY NOTE
PT TREATMENT     06/17/24 0955   PT Last Visit   PT Visit Date 06/17/24   Note Type   Note Type Treatment   Pain Assessment   Pain Assessment Tool Riojas-Baker FACES   Riojas-Baker FACES Pain Rating 6  (L LE lower leg area to touch/pressure)   Restrictions/Precautions   Weight Bearing Precautions Per Order No   Other Precautions Fall Risk;Pain  (LE edema)   General   Chart Reviewed Yes   Family/Caregiver Present No   Cognition   Arousal/Participation Cooperative   Subjective   Subjective patient reports feeling good about being able to move better today   Bed Mobility   Supine to Sit 7  Independent   Sit to Supine 7  Independent   Transfers   Sit to Stand 7  Independent   Stand to Sit 7  Independent   Ambulation/Elevation   Gait Assistance 5  Supervision   Assistive Device   (None)   Distance 30 feet with change in direction.  Steady gait patterning although slow patterning noted   Stair Management Assistance 5  Supervision   Additional items Verbal cues   Stair Management Technique One rail L;Step to pattern   Number of Stairs 10   Balance   Static Sitting Good   Dynamic Sitting Fair   Static Standing Fair   Dynamic Standing Fair   Ambulatory Fair   Activity Tolerance   Activity Tolerance Patient tolerated treatment well   Nurse Made Aware Yes   Exercises   Knee AROM Long Arc Quad Sitting;10 reps;Bilateral   Ankle Pumps Sitting;10 reps;Bilateral   Balance training  Sidestepping and backward walking completed for balance and coordination   Assessment   Assessment Patient demonstrated improvement in gait balance and endurance this session on level and unlevel surfaces.  Able to ascend and descend 10 stairs with slow pacing and step to patterning.  Education completed and benefits of use of cane with 1 rail for home set up (patient to be given a cane for home use).  Patient will benefit from continued physical therapy with progression as tolerated and increasing functional mobility with clinical staff as well. The  patient's AM-PAC Basic Mobility Inpatient Short Form Raw Score is 23. A Raw score of greater than 16 suggests the patient may benefit from discharge to home. Please also refer to the recommendation of the Physical Therapist for safe discharge planning.         Plan   Treatment/Interventions Functional transfer training;LE strengthening/ROM;Elevations;Therapeutic exercise;Endurance training;Patient/family training;Gait training;Compensatory technique education;Equipment eval/education   PT Frequency Other (Comment)  (5 times per week)   Discharge Recommendation   Rehab Resource Intensity Level, PT III (Minimum Resource Intensity)   AM-PAC Basic Mobility Inpatient   Turning in Flat Bed Without Bedrails 4   Lying on Back to Sitting on Edge of Flat Bed Without Bedrails 4   Moving Bed to Chair 4   Standing Up From Chair Using Arms 4   Walk in Room 4   Climb 3-5 Stairs With Railing 3   Basic Mobility Inpatient Raw Score 23   Basic Mobility Standardized Score 50.88   [facility] Highest Level Of Mobility   JH-HLM Goal 7: Walk 25 feet or more   JH-HLM Achieved 7: Walk 25 feet or more   Education   Patient Demonstrates acceptance/verbal understanding;Explanation/teachback used;Demonstrates verbal understanding   Licensure   [place] License Number  Heather Miller, PT 4 [id number]

## 2024-06-17 NOTE — PLAN OF CARE
Problem: OCCUPATIONAL THERAPY ADULT  Goal: Performs self-care activities at highest level of function for planned discharge setting.  See evaluation for individualized goals.  Description: Treatment Interventions: ADL retraining, Endurance training, Equipment evaluation/education, Patient/family training, Functional transfer training, Compensatory technique education, Continued evaluation, Energy conservation, Activityengagement          See flowsheet documentation for full assessment, interventions and recommendations.   Outcome: Progressing  Note: Limitation: Decreased ADL status, Decreased endurance, Decreased self-care trans, Decreased high-level ADLs (impaired activity tolerance, pain, forward functional reach, standing tolerance, balance, LE strength, LE edema)     Assessment: Pt seen for skilled (split) OT tx session. Pt agreeable to participate and motivated. Pt required less physical assistance and improved activity tolerance. Pt able to complete bathing, toileting and grooming in stance at sink w/ mod I for + time. Pt tolerated standing 20 minutes w/ fair balance. Pt continues to demonstrate decreased activity tolerance, increased pain and increased LE / groin edema impacting his I w/ toileting, LBD. Continue to recommend level III rehab resourece intensity when medically stable for discharge from acute care pend medical optimization and ability to mange stairs. Will continue to follow     Rehab Resource Intensity Level, OT: III (Minimum Resource Intensity)

## 2024-06-18 ENCOUNTER — APPOINTMENT (INPATIENT)
Dept: DIALYSIS | Facility: HOSPITAL | Age: 44
DRG: 673 | End: 2024-06-18
Attending: STUDENT IN AN ORGANIZED HEALTH CARE EDUCATION/TRAINING PROGRAM
Payer: COMMERCIAL

## 2024-06-18 PROBLEM — J96.01 ACUTE RESPIRATORY FAILURE WITH HYPOXIA (HCC): Status: RESOLVED | Noted: 2023-10-07 | Resolved: 2024-06-18

## 2024-06-18 LAB
ANION GAP SERPL CALCULATED.3IONS-SCNC: 13 MMOL/L (ref 4–13)
BUN SERPL-MCNC: 66 MG/DL (ref 5–25)
CALCIUM SERPL-MCNC: 8.3 MG/DL (ref 8.4–10.2)
CHLORIDE SERPL-SCNC: 91 MMOL/L (ref 96–108)
CO2 SERPL-SCNC: 23 MMOL/L (ref 21–32)
CREAT SERPL-MCNC: 11.18 MG/DL (ref 0.6–1.3)
DME PARACHUTE DELIVERY DATE REQUESTED: NORMAL
DME PARACHUTE ITEM DESCRIPTION: NORMAL
DME PARACHUTE ORDER STATUS: NORMAL
DME PARACHUTE SUPPLIER NAME: NORMAL
DME PARACHUTE SUPPLIER PHONE: NORMAL
ERYTHROCYTE [DISTWIDTH] IN BLOOD BY AUTOMATED COUNT: 14.3 % (ref 11.6–15.1)
GFR SERPL CREATININE-BSD FRML MDRD: 4 ML/MIN/1.73SQ M
GLUCOSE SERPL-MCNC: 100 MG/DL (ref 65–140)
GLUCOSE SERPL-MCNC: 119 MG/DL (ref 65–140)
GLUCOSE SERPL-MCNC: 125 MG/DL (ref 65–140)
GLUCOSE SERPL-MCNC: 158 MG/DL (ref 65–140)
GLUCOSE SERPL-MCNC: 94 MG/DL (ref 65–140)
HCT VFR BLD AUTO: 24.2 % (ref 36.5–49.3)
HGB BLD-MCNC: 7.5 G/DL (ref 12–17)
MCH RBC QN AUTO: 28 PG (ref 26.8–34.3)
MCHC RBC AUTO-ENTMCNC: 31 G/DL (ref 31.4–37.4)
MCV RBC AUTO: 90 FL (ref 82–98)
PLATELET # BLD AUTO: 378 THOUSANDS/UL (ref 149–390)
PMV BLD AUTO: 8.5 FL (ref 8.9–12.7)
POTASSIUM SERPL-SCNC: 5 MMOL/L (ref 3.5–5.3)
RBC # BLD AUTO: 2.68 MILLION/UL (ref 3.88–5.62)
SODIUM SERPL-SCNC: 127 MMOL/L (ref 135–147)
WBC # BLD AUTO: 12.61 THOUSAND/UL (ref 4.31–10.16)

## 2024-06-18 PROCEDURE — 82948 REAGENT STRIP/BLOOD GLUCOSE: CPT

## 2024-06-18 PROCEDURE — 99233 SBSQ HOSP IP/OBS HIGH 50: CPT | Performed by: STUDENT IN AN ORGANIZED HEALTH CARE EDUCATION/TRAINING PROGRAM

## 2024-06-18 PROCEDURE — 97110 THERAPEUTIC EXERCISES: CPT

## 2024-06-18 PROCEDURE — 80048 BASIC METABOLIC PNL TOTAL CA: CPT | Performed by: INTERNAL MEDICINE

## 2024-06-18 PROCEDURE — 99232 SBSQ HOSP IP/OBS MODERATE 35: CPT | Performed by: INTERNAL MEDICINE

## 2024-06-18 PROCEDURE — 85027 COMPLETE CBC AUTOMATED: CPT | Performed by: INTERNAL MEDICINE

## 2024-06-18 RX ORDER — ACETAMINOPHEN 325 MG/1
975 TABLET ORAL EVERY 8 HOURS SCHEDULED
Status: COMPLETED | OUTPATIENT
Start: 2024-06-18 | End: 2024-06-19

## 2024-06-18 RX ADMIN — NYSTATIN 1 APPLICATION: 100000 POWDER TOPICAL at 09:32

## 2024-06-18 RX ADMIN — ATORVASTATIN CALCIUM 80 MG: 80 TABLET, FILM COATED ORAL at 09:20

## 2024-06-18 RX ADMIN — HEPARIN SODIUM 5000 UNITS: 5000 INJECTION, SOLUTION INTRAVENOUS; SUBCUTANEOUS at 05:51

## 2024-06-18 RX ADMIN — CYANOCOBALAMIN TAB 500 MCG 1000 MCG: 500 TAB at 09:20

## 2024-06-18 RX ADMIN — CEFAZOLIN SODIUM 2000 MG: 2 SOLUTION INTRAVENOUS at 16:52

## 2024-06-18 RX ADMIN — OXYCODONE HYDROCHLORIDE 5 MG: 5 TABLET ORAL at 09:19

## 2024-06-18 RX ADMIN — Medication 5000 UNITS: at 09:21

## 2024-06-18 RX ADMIN — ACETAMINOPHEN 975 MG: 325 TABLET ORAL at 14:01

## 2024-06-18 RX ADMIN — OXYCODONE HYDROCHLORIDE 5 MG: 5 TABLET ORAL at 16:53

## 2024-06-18 RX ADMIN — OXYCODONE HYDROCHLORIDE 5 MG: 5 TABLET ORAL at 01:58

## 2024-06-18 RX ADMIN — HYDROMORPHONE HYDROCHLORIDE 0.5 MG: 1 INJECTION, SOLUTION INTRAMUSCULAR; INTRAVENOUS; SUBCUTANEOUS at 05:51

## 2024-06-18 RX ADMIN — HYDROMORPHONE HYDROCHLORIDE 0.5 MG: 1 INJECTION, SOLUTION INTRAMUSCULAR; INTRAVENOUS; SUBCUTANEOUS at 22:27

## 2024-06-18 RX ADMIN — NIFEDIPINE 30 MG: 30 TABLET, EXTENDED RELEASE ORAL at 09:20

## 2024-06-18 RX ADMIN — HYDROMORPHONE HYDROCHLORIDE 0.5 MG: 1 INJECTION, SOLUTION INTRAMUSCULAR; INTRAVENOUS; SUBCUTANEOUS at 18:27

## 2024-06-18 RX ADMIN — ACETAMINOPHEN 975 MG: 325 TABLET ORAL at 22:09

## 2024-06-18 RX ADMIN — OXYCODONE HYDROCHLORIDE 5 MG: 5 TABLET ORAL at 23:34

## 2024-06-18 RX ADMIN — NYSTATIN 1 APPLICATION: 100000 POWDER TOPICAL at 18:29

## 2024-06-18 RX ADMIN — HEPARIN SODIUM 5000 UNITS: 5000 INJECTION, SOLUTION INTRAVENOUS; SUBCUTANEOUS at 22:10

## 2024-06-18 RX ADMIN — METOPROLOL SUCCINATE 25 MG: 25 TABLET, EXTENDED RELEASE ORAL at 09:20

## 2024-06-18 RX ADMIN — HEPARIN SODIUM 5000 UNITS: 5000 INJECTION, SOLUTION INTRAVENOUS; SUBCUTANEOUS at 14:01

## 2024-06-18 RX ADMIN — HYDROMORPHONE HYDROCHLORIDE 0.5 MG: 1 INJECTION, SOLUTION INTRAMUSCULAR; INTRAVENOUS; SUBCUTANEOUS at 11:25

## 2024-06-18 RX ADMIN — HYOSCYAMINE SULFATE 1 APPLICATION: 16 SOLUTION at 09:32

## 2024-06-18 NOTE — OCCUPATIONAL THERAPY NOTE
Occupational Therapy Cancel Note     Patient Name: Joaquin Frankel  Today's Date: 6/18/2024  Problem List  Principal Problem:    CLOVIS (acute kidney injury) (HCC)  Active Problems:    Coronary artery disease    Hypertension    Diabetes (HCC)    Hyponatremia    Morbid obesity (HCC)    Anemia of chronic disease    Chronic acquired lymphedema    Acute respiratory failure with hypoxia (HCC)    Wound of abdomen    SIRS (systemic inflammatory response syndrome) ([facility])       06/18/24 1140   Note Type   Note Type Cancelled Session  (Tuesday 6/18/24)   Cancel Reasons Patient off floor/hemodialysis   Licensure   [place] License Number  Katie Pathak, OTR/L [id number]     Katie Pathak, OTR/L  [id number]  [id number]

## 2024-06-18 NOTE — PROGRESS NOTES
NEPHROLOGY PROGRESS NOTE   Joaquin Frankel 44 y.o. male MRN: [id number]  Unit/Bed#: [address] [place] [address] Encounter: [id number]  Reason for Consult: ARF      SUMMARY:    43 yo man with PMH of morbid obesity, lower extremity lymphedema, diabetes, hypertension, CAD p/w shortness of breath for the last 2 weeks.  Nephrology is consulted for management of CLOVIS        ASSESSMENT and PLAN:     Acute renal failure, currently dialysis dependent  -- Present on admission with suspected underlying chronic kidney disease as prior baseline creatinine was 1.3 to 1.6 mg/dL with previous episodes of acute kidney injury.  Admission creatinine 6 mg/dL  -- Peak creatinine was 10.4 mg/dL prior to dialysis.  -- Initially started on CRRT then transition to intermittent hemodialysis.  Renal placement initiation on June 7th  -- Serologies: ANCA titer was negative, anti-GBM was negative.  JOSE ROBERTO/anti-double-stranded DNA was negative.  Kappa/lambda ratio was within normal limits.  Serum and urine immunofixation showed IgG kappa monoclonal bands.  Complement C3 and C4 were normal.  Beta-2 microglobulin was elevated.  -- Renal imaging via CAT scan showed no evidence of hydronephrosis  -- Access: New right IJ permacath was placed on Friday by interventional radiology.  Temporary HD catheter was removed  -- Status post bone marrow biopsy on June 13.  Awaiting results.  If evidence of multiple myeloma we will then proceed with management plan as per hematology.  If bone marrow biopsy is negative will consider a renal biopsy.  Suspect MGRS  -- Currently dialyzing on a TTS schedule.  Plan for next dialysis today  -- At this time no evidence of renal recovery.  Continue dialysis schedule.  --Discussed with case management.  Outpatient HD placement.  Once established stable from renal standpoint for discharge.     Chronic kidney disease stage III  -- Prior baseline creatinine 1.3 to 1.6 mg/dL  -- Presumably secondary to diabetic kidney disease, hypertensive  nephrosclerosis, obesity related renal dysfunction  --Suspect he will be admitted new baseline creatinine given the stigmata of CLOVIS     Anemia of chronic kidney disease  -- Follow-up bone marrow biopsy  -- SPEP and UPEP positive for IgG kappa monoclonal bands  -- Hematology on board  -- Received IV iron     Mineral bone disorder-chronic kidney disease  -- Secondary hyperparathyroidism of renal origin.  -- Vitamin D deficiency  -- Hyperphosphatemia  -- Patient started on vitamin D supplementation     Hyponatremia  -- Sodium 127  --Volume mediated  --Filtration with HD     Systemic inflammatory response syndrome  -- CT of the left lower extremity showed extensive subcutaneous edema and skin thickening  -- Blood cultures are negative  -- Currently on IV Ancef post HD for 1 more dose tomorrow  -- Draining abscess under pannus on June 13 grew out beta-hemolytic Streptococcus group C     Chronic lymphedema     Morbid obesity  -- BMI 62      SUBJECTIVE / INTERVAL HISTORY:    No complaints this morning    OBJECTIVE:  Current Weight: Weight - Scale: (!) 185 kg (408 lb)  Vitals:    06/17/24 2100 06/17/24 2238 06/18/24 0550 06/18/24 0915   BP:  118/59  169/89   Pulse:  67  66   Resp:  14  20   Temp:  98.2 °F (36.8 °C)  97.5 °F (36.4 °C)   TempSrc:       SpO2: 92% 91%  94%   Weight:   (!) 185 kg (408 lb)    Height:           Intake/Output Summary (Last 24 hours) at 6/18/2024 1025  Last data filed at 6/17/2024 1901  Gross per 24 hour   Intake 480 ml   Output --   Net 480 ml       Review of Systems:    Constitutional: Negative for chills and fever.   HENT: Negative for ear pain and sore throat.    Eyes: Negative for pain and visual disturbance.   Respiratory: Negative for cough and shortness of breath.    Cardiovascular: Negative for chest pain and palpitations.   Gastrointestinal: Negative for abdominal pain and vomiting.   Genitourinary: Negative for dysuria and hematuria.   Musculoskeletal: Negative for arthralgias and back  pain.   Skin: Negative for color change and rash.   Neurological: Negative for seizures and syncope.   12 point ROS has been reviewed.    Physical Exam  Vitals and nursing note reviewed.   Constitutional:       General: He is not in acute distress.     Appearance: He is well-developed. He is obese.   HENT:      Head: Normocephalic and atraumatic.   Eyes:      General: No scleral icterus.     Conjunctiva/sclera: Conjunctivae normal.      Pupils: Pupils are equal, round, and reactive to light.   Cardiovascular:      Rate and Rhythm: Normal rate and regular rhythm.      Heart sounds: S1 normal and S2 normal. No murmur heard.     No friction rub. No gallop.   Pulmonary:      Effort: Pulmonary effort is normal. No respiratory distress.      Breath sounds: Normal breath sounds. No wheezing or rales.   Abdominal:      General: Bowel sounds are normal.      Palpations: Abdomen is soft.      Tenderness: There is no abdominal tenderness. There is no rebound.   Musculoskeletal:         General: Normal range of motion.      Cervical back: Normal range of motion and neck supple.      Right lower leg: Edema present.      Left lower leg: Edema present.   Skin:     Findings: No rash.   Neurological:      Mental Status: He is alert and oriented to person, place, and time.   Psychiatric:         Behavior: Behavior normal.         Medications:    Current Facility-Administered Medications:     acetaminophen (TYLENOL) tablet 650 mg, 650 mg, Oral, Q6H PRN, Dennis Waterman MD    atorvastatin (LIPITOR) tablet 80 mg, 80 mg, Oral, Daily, PETRA Briceño, 80 mg at 06/18/24 0920    ceFAZolin (ANCEF) IVPB (premix in dextrose) 2,000 mg 50 mL, 2,000 mg, Intravenous, Once per day on Tuesday Thursday Saturday, Dennis Waterman MD    Cholecalciferol (VITAMIN D3) tablet 5,000 Units, 5,000 Units, Oral, Daily, PETRA Briceño, 5,000 Units at 06/18/24 0921    cyanocobalamin (VITAMIN B-12) tablet 1,000 mcg, 1,000 mcg,  Oral, Daily, PETRA Briceño, 1,000 mcg at 06/18/24 0920    docusate sodium (COLACE) capsule 100 mg, 100 mg, Oral, BID, PETRA Briceño, 100 mg at 06/17/24 0857    gabapentin (NEURONTIN) capsule 300 mg, 300 mg, Oral, BID, PETRA Briceño, 300 mg at 06/17/24 0857    heparin (porcine) subcutaneous injection 5,000 Units, 5,000 Units, Subcutaneous, Q8H SHAZIA, PETRA Briceño, 5,000 Units at 06/18/24 0551    HYDROmorphone (DILAUDID) injection 0.5 mg, 0.5 mg, Intravenous, Q4H PRN, Dennis Waterman MD, 0.5 mg at 06/18/24 0551    insulin lispro (HumALOG/ADMELOG) 100 units/mL subcutaneous injection 1-6 Units, 1-6 Units, Subcutaneous, HS, PETRA Briceño, 1 Units at 06/06/24 2150    insulin lispro (HumALOG/ADMELOG) 100 units/mL subcutaneous injection 2-12 Units, 2-12 Units, Subcutaneous, TID AC, 2 Units at 06/08/24 1720 **AND** Fingerstick Glucose (POCT), , , TID AC, PETRA Briceño    lidocaine (LIDODERM) 5 % patch 1 patch, 1 patch, Topical, Daily, PETRA Briceño, 1 patch at 06/17/24 0857    metoprolol succinate (TOPROL-XL) 24 hr tablet 25 mg, 25 mg, Oral, Daily, Jose De Jesus Cardozo PA-C, 25 mg at 06/18/24 0920    midodrine (PROAMATINE) tablet 5 mg, 5 mg, Oral, Before Dialysis, PETRA Briceño, 5 mg at 06/11/24 1317    NIFEdipine (PROCARDIA XL) 24 hr tablet 30 mg, 30 mg, Oral, Daily, Joselyn Reyes Bahamonde, MD, 30 mg at 06/18/24 0920    nystatin (MYCOSTATIN) powder 1 Application, 1 Application, Topical, BID, PETRA Briceño, 1 Application at 06/18/24 0932    ondansetron (ZOFRAN) injection 4 mg, 4 mg, Intravenous, Q6H PRN, PETRA Briceño, 4 mg at 06/14/24 0715    oxyCODONE (ROXICODONE) IR tablet 5 mg, 5 mg, Oral, Q6H PRN, Dennis Waterman MD, 5 mg at 06/18/24 0919    polyethylene glycol (MIRALAX) packet 17 g, 17 g, Oral, Daily PRN, PETRA Briceño     senna (SENOKOT) tablet 8.6 mg, 1 tablet, Oral, HS, PETRA Briceño, 8.6 mg at 06/17/24 2114    sodium hypochlorite (DAKIN'S HALF-STRENGTH) 0.25 percent topical solution 1 Application, 1 Application, Irrigation, Daily, PETRA Briceño, 1 Application at 06/18/24 0932    Laboratory Results:  Results from last 7 days   Lab Units 06/18/24  0603 06/17/24  0450 06/16/24  0510 06/14/24  0751 06/13/24  0640 06/12/24  0631   WBC Thousand/uL 12.61* 11.74* 12.57* 11.18* 11.51* 10.22*   HEMOGLOBIN g/dL 7.5* 7.5* 7.7* 7.6* 7.0* 7.3*   HEMATOCRIT % 24.2* 23.6* 24.2* 24.1* 22.8* 23.0*   PLATELETS Thousands/uL 378 346 316 303 284 298   POTASSIUM mmol/L 5.0 4.5 4.0 4.1 4.4 4.0   CHLORIDE mmol/L 91* 93* 91* 93* 96 101   CO2 mmol/L 23 27 27 27 24 26   BUN mg/dL 66* 56* 48* 48* 67* 52*   CREATININE mg/dL 11.18* 9.48* 8.07* 7.63* 8.94* 7.10*   CALCIUM mg/dL 8.3* 8.1* 8.0* 8.0* 8.0* 7.9*   MAGNESIUM mg/dL  --   --   --   --   --  2.1   PHOSPHORUS mg/dL  --   --   --   --   --  5.6*       PLEASE NOTE:  This encounter was completed utilizing the [employer] Direct Speech Voice Recognition Software. Grammatical errors, random word insertions, pronoun errors and incomplete sentences are occasional consequences of the system due to software limitations, ambient noise and hardware issues.These may be missed by proof reading prior to affixing electronic signature. Any questions or concerns about the content, text or information contained within the body of this dictation should be directly addressed to the physician for clarification. Please do not hesitate to call me directly if you have any any questions or concerns.

## 2024-06-18 NOTE — PHYSICAL THERAPY NOTE
PT TREATMENT     06/18/24 1100   PT Last Visit   PT Visit Date 06/18/24   Note Type   Note Type Treatment   Pain Assessment   Pain Assessment Tool Riojas-Baker FACES   Riojas-Baker FACES Pain Rating 6  (Left lower extremity)   Restrictions/Precautions   Other Precautions Pain;Fall Risk   General   Chart Reviewed Yes   Family/Caregiver Present No   Cognition   Arousal/Participation Cooperative   Subjective   Subjective Patient states continued weakness feeling although he is happy with improved mobility   Bed Mobility   Additional Comments Patient sitting on bed edge upon arrival by therapist   Transfers   Sit to Stand 7  Independent   Stand to Sit 7  Independent   Ambulation/Elevation   Gait Assistance 5  Supervision   Assistive Device Straight cane  (Bariatric straight cane)   Distance 80 feet with change in direction.  Slow gait patterning but steady gait.  Education and cueing for proper cane usage   Exercises   Ankle Pumps Sitting;10 reps;Bilateral   Marching Standing;10 reps;Bilateral   Balance training  Standing balance activity completed including sidestepping and backward walking   Assessment   Assessment Patient tolerating use of single-point cane well.  Patient fitted for bariatric cane by therapist.  Patient will benefit from continued physical therapy with progression as tolerated and increasing functional mobility with clinical staff as well. The patient's AM-[facility] Basic Mobility Inpatient Short Form Raw Score is 23. A Raw score of greater than 16 suggests the patient may benefit from discharge to home. Please also refer to the recommendation of the Physical Therapist for safe discharge planning.         Plan   Treatment/Interventions Functional transfer training;LE strengthening/ROM;Therapeutic exercise;Endurance training;Patient/family training;Equipment eval/education;Gait training;Compensatory technique education   PT Frequency Other (Comment)  (5 times per week)   Discharge Recommendation   Rehab  Resource Intensity Level, PT III (Minimum Resource Intensity)   AM-PAC Basic Mobility Inpatient   Turning in Flat Bed Without Bedrails 4   Lying on Back to Sitting on Edge of Flat Bed Without Bedrails 4   Moving Bed to Chair 4   Standing Up From Chair Using Arms 4   Walk in Room 4   Climb 3-5 Stairs With Railing 3   Basic Mobility Inpatient Raw Score 23   Basic Mobility Standardized Score 50.88   [facility] Highest Level Of Mobility   JH-HLM Goal 7: Walk 25 feet or more   JH-HLM Achieved 7: Walk 25 feet or more   Education   Patient Demonstrates acceptance/verbal understanding;Explanation/teachback used;Demonstrates verbal understanding   Licensure   [place] License Number  Heather Miller [id number]

## 2024-06-18 NOTE — PROGRESS NOTES
Count includes the [facility]  Progress Note  Name: Joaquin Frankel I  MRN: [id number]  Unit/Bed#: 4 [address] I Date of Admission: 6/3/2024   Date of Service: 6/18/2024 I Hospital Day: 15    Assessment & Plan   * Acute renal failure (ARF) ([facility])  Assessment & Plan  POA with creatinine of 6.40 >11.18  BL CR 1.3-1.4  Initiated on HD on 6/7  UA: Hematuria, proteinuria.  UA CR - 5280  Serologies: ANCA titer was negative, anti-GBM was negative.  JOSE ROBERTO/anti-double-stranded DNA was negative.  Kappa/lambda ratio was within normal limits.  Serum and urine immunofixation showed IgG kappa monoclonal bands.  Complement C3 and C4 were normal.  Beta-2 microglobulin was elevated.  CT: No evidence of hydronephrosis  D/W recs nephrology:  Suspect MGRS  HD TTS schedule.  Plan for next dialysis today> OP HD  At this time no evidence of renal recovery.  Continue dialysis schedule.  Once established stable from renal standpoint for discharge. TTS schedule  Follow-up bone marrow biopsies/renal biopsy      SIRS (systemic inflammatory response syndrome) ([facility])  Assessment & Plan  CT left lower extremity-extensive subcutaneous edema and skin thickening  CT CAP-without any acute abnormality  BCx negative, MRSA surveillance negative  Noted to left lower extremity cellulitis superimposed on lymphedema, now improved  Source likely recurrent draining abscess under the pannus on 6/13.    Cx: Of beta-hemolytic Streptococcus group C  S/P I&D of the pannus by surgery on 6/13.    Continue IV cefazolin post HD, > 6/18  Negative venous Doppler    Wound of abdomen  Assessment & Plan  Seen by wound care, recommended general surgery consultation  Appreciate surgery input  Continue local wound care per wound care nurse  Follow-up in the outpatient wound center    Chronic acquired lymphedema  Assessment & Plan  Patient developed LLE lymphedema after left lower extremity surgical procedure several years ago.  Patient complaining of increasing pain in  the left lower extremity likely secondary to volume overload.  Left lower extremity venous Doppler: Negative  Prior referrals ambulatory referral to lymphedema clinic.    Anemia of chronic disease  Assessment & Plan  Patient has anemia of chronic disease likely secondary to underlying nephropathy,?  Myeloma.      He did have some episodes of hematuria.  No further episodes  No further overt bleeding  Hemoglobin gradually down trended to 6.7 g/dL, status post 1 PRBC on 6/11.  Subsequently stable  B12 310, continue supplementation  Iron panel shows iron deficiency.  Status post IV venofer 200mg every other day x 2  Continue to monitor hemoglobin closely and transfuse for hemoglobin less than 7.0 or drastic drop in hemoglobin.    Morbid obesity (HCC)  Assessment & Plan  With BMI of 64.42 which is good  Encourage lifestyle modification on discharge    Hyponatremia  Assessment & Plan  NA-127  HD  Nephro following    Diabetes (HCC)  Assessment & Plan  Lab Results   Component Value Date    HGBA1C 5.7 (H) 06/09/2024     Recent Labs     06/16/24  1607 06/16/24 2009 06/17/24  0729 06/17/24  1100   POCGLU 132 133 99 118     Blood Sugar Average: Last 72 hrs:  (P) [id number]    Amaryl held during his hospitalization.   Acceptable, continue to monitor on current regimen  Continue diabetic diet.      Hypertension  Assessment & Plan  Held amlodipine and carvedilol for low blood pressure initially  Requiring midodrine predialysis as needed  Blood pressure stable/improving at present  Discontinued Bumex as patient remains anuric  Started Toprol-XL  Procardia added  Monitor intake output, daily weight  Monitor blood pressure    Coronary artery disease  Assessment & Plan  Patient with a history of coronary artery disease with multivessel disease status post stents.  Cardiology following, input appreciated  Held aspirin initially for biopsy, subsequently resumed.  Discussed with nephrology, holding aspirin again in case  patient requires kidney biopsy  Continue on Toprol-XL  Continue statin    Acute respiratory failure with hypoxia (HCC)-resolved as of 6/18/2024  Assessment & Plan  Noted to have worsening hypoxia during hospitalization requiring MFNC  Likely multifactorial including volume overload, suspected underlying obstructive sleep apnea and obesity hypoventilation syndrome.  Currently saturating adequately on room air  No evidence of pulmonary emboli on diagnostic studies.  CT chest with evidence of bibasilar effusion with compressive atelectasis  Continue ultrafiltration as tolerated  Continue supplemental oxygen as needed  Incentive spirometry  Patient states he has an upcoming appointment with a sleep specialist and ultimately will undergo a sleep study as an outpatient.  He has been told he has overnight hypoxia.      Chest pain-resolved as of 6/15/2024  Assessment & Plan  POA.  Denies chest discomfort at present  VQ scan low suspicion for PE  Echo with normal EF and grade 1 diastolic dysfunction  Cardiology input appreciated           VTE Pharmacologic Prophylaxis: VTE Score: 7 Moderate Risk (Score 3-4) - Pharmacological DVT Prophylaxis Ordered: heparin.    Mobility:   Basic Mobility Inpatient Raw Score: 23  JH-HLM Goal: 7: Walk 25 feet or more  JH-HLM Achieved: 4: Move to chair/commode  JH-HLM Goal NOT achieved. Continue with multidisciplinary rounding and encourage appropriate mobility to improve upon JH-HLM goals.    Patient Centered Rounds: I evaluated the patient without nursing staff present due to alternate patient care responsibilities    Discussions with Specialists or Other Care Team Provider: Nephrology    Education and Discussions with Family / Patient:  Spoke with patient.     Total Time Spent on Date of Encounter in care of patient: 40 mins. This time was spent on one or more of the following: performing physical exam; counseling and coordination of care; obtaining or reviewing history; documenting in the  medical record; reviewing/ordering tests, medications or procedures; communicating with other healthcare professionals and discussing with patient's family/caregivers.    Current Length of Stay: 15 day(s)  Current Patient Status: Inpatient   Certification Statement: The patient will continue to require additional inpatient hospital stay due to clinical course and specialist recs  Discharge Plan: Anticipate discharge in 24-48 hrs to discharge location to be determined pending rehab evaluations.    Code Status: Level 1 - Full Code    Subjective:   Patient seen and examined at bedside, reported thought he had low urine output for several months while working only using restroom once a day but at night reported multiple nighttime urination every 2 hours.  Patient reported switching from [facility] to different PCP.  Patient reported PTA minimal urination.  Reported no chest pain, shortness of breath, fever or chills.  Patient reported thinking his dry weight was 380 pounds and noticed prior to arrival he was around 420 pounds.  Reported still having regular swelling and thinking that swelling in abdomen and foot was due to his lymphedema.  Patient reported associated low back pain and area where biopsy was completed currently 8/10.  Patient reported due to chronic low back pain was taking multiple Advil up to 800 mg daily.    Objective:     Vitals:   Temp (24hrs), Av.3 °F (36.8 °C), Min:97.5 °F (36.4 °C), Max:99 °F (37.2 °C)    Temp:  [97.5 °F (36.4 °C)-99 °F (37.2 °C)] 97.5 °F (36.4 °C)  HR:  [62-82] 62  Resp:  [14-20] 14  BP: (118-169)/(59-89) 133/62  SpO2:  [90 %-95 %] 92 %  Body mass index is 63.9 kg/m².     Input and Output Summary (last 24 hours):     Intake/Output Summary (Last 24 hours) at 2024 1335  Last data filed at 2024 1210  Gross per 24 hour   Intake 440 ml   Output --   Net 440 ml       Physical Exam:   Physical Exam  Vitals and nursing note reviewed.   Constitutional:       General: He is not in  acute distress.     Appearance: He is well-developed. He is obese. He is not ill-appearing.   HENT:      Head: Normocephalic and atraumatic.   Eyes:      Conjunctiva/sclera: Conjunctivae normal.   Cardiovascular:      Rate and Rhythm: Normal rate and regular rhythm.      Heart sounds: No murmur heard.  Pulmonary:      Effort: Pulmonary effort is normal. No respiratory distress.      Breath sounds: Normal breath sounds. No wheezing or rhonchi.   Abdominal:      Palpations: Abdomen is soft.      Tenderness: There is no abdominal tenderness.      Comments: Enlarged panniculus, taunt   Musculoskeletal:         General: No swelling.      Cervical back: Neck supple.      Right lower leg: Swelling present. 2+ Pitting Edema present.      Left lower leg: Swelling present. 4+ Edema present.      Comments: LLE hyperpigmentation, keratosis   Skin:     General: Skin is warm and dry.      Capillary Refill: Capillary refill takes less than 2 seconds.   Neurological:      Mental Status: He is alert.   Psychiatric:         Mood and Affect: Mood normal.          Additional Data:     Labs:  Results from last 7 days   Lab Units 06/18/24  0603 06/13/24  0640 06/12/24  0631   WBC Thousand/uL 12.61*   < > 10.22*   HEMOGLOBIN g/dL 7.5*   < > 7.3*   HEMATOCRIT % 24.2*   < > 23.0*   PLATELETS Thousands/uL 378   < > 298   BANDS PCT %  --   --  1   LYMPHO PCT %  --   --  5   MONO PCT %  --   --  4   EOS PCT %  --   --  5    < > = values in this interval not displayed.     Results from last 7 days   Lab Units 06/18/24  0603   SODIUM mmol/L 127*   POTASSIUM mmol/L 5.0   CHLORIDE mmol/L 91*   CO2 mmol/L 23   BUN mg/dL 66*   CREATININE mg/dL 11.18*   ANION GAP mmol/L 13   CALCIUM mg/dL 8.3*   GLUCOSE RANDOM mg/dL 94     Results from last 7 days   Lab Units 06/12/24  0631   INR  1.13     Results from last 7 days   Lab Units 06/18/24  1103 06/18/24  0730 06/17/24  2058 06/17/24  1620 06/17/24  1100 06/17/24  0729 06/16/24  2009 06/16/24  1607  06/16/24  1108 06/16/24  0726 06/15/24  2007 06/15/24  1619   POC GLUCOSE mg/dl 125 100 151* 123 118 99 133 132 102 88 126 130               Lines/Drains:  Invasive Devices       Peripheral Intravenous Line  Duration             Peripheral IV 06/18/24 Dorsal (posterior);Right Hand <1 day              Hemodialysis Catheter  Duration             HD Permanent Double Catheter 3 days                      Imaging: Reviewed radiology reports from this admission including: Venous duplex    Recent Cultures (last 7 days):   Results from last 7 days   Lab Units 06/13/24  1252   GRAM STAIN RESULT  3+ Polys  No organisms seen   WOUND CULTURE  2+ Growth of Beta Hemolytic Streptococcus Group C*  1+ Growth of       Last 24 Hours Medication List:   Current Facility-Administered Medications   Medication Dose Route Frequency Provider Last Rate    acetaminophen  975 mg Oral Q8H SHAZIA Alyssa Levy MD      atorvastatin  80 mg Oral Daily PETRA Briceño      cefazolin  2,000 mg Intravenous Once per day on Tuesday Thursday Saturday Dennis Waterman MD      Cholecalciferol  5,000 Units Oral Daily PETRA Briceño      cyanocobalamin  1,000 mcg Oral Daily HollyPETRA Avalos      docusate sodium  100 mg Oral BID HollyPETRA Avalos      gabapentin  300 mg Oral BID HollyPETRA Avalos      heparin (porcine)  5,000 Units Subcutaneous Q8H [facility] PETRA Briceño      HYDROmorphone  0.5 mg Intravenous Q4H PRN Dennis Waterman MD      insulin lispro  1-6 Units Subcutaneous HS PETRA Briceño      insulin lispro  2-12 Units Subcutaneous TID AC PETRA Briceño      lidocaine  1 patch Topical Daily PETRA Briceño      metoprolol succinate  25 mg Oral Daily Jose De Jesus Cardozo PA-C      midodrine  5 mg Oral Before Dialysis PETRA Briceño      NIFEdipine  30 mg Oral Daily Joselyn Reyes Bahamonde, MD       nystatin  1 Application Topical BID PETRA Briceño      ondansetron  4 mg Intravenous Q6H PRN PTERA Briceño      oxyCODONE  5 mg Oral Q6H PRN Dennis Waterman MD      polyethylene glycol  17 g Oral Daily PRN PETRA Briceño      senna  1 tablet Oral HS PETRA Briceño      sodium hypochlorite  1 Application Irrigation Daily PETRA Briceño          Today, Patient Was Seen By: Alyssa Levy MD    **Please Note: This note may have been constructed using a voice recognition system.**

## 2024-06-18 NOTE — PLAN OF CARE
Post-Dialysis RN Treatment Note    Blood Pressure:  Pre 145/76 mm/Hg  Post 119/69 mmHg   EDW  TBD kg    Weight:  Pre 185 kg   Post 181 kg   Mode of weight measurement: Bed Scale   Volume Removed  4000 ml    Treatment duration 210 minutes    NS given  No    Treatment shortened? No   Medications given during Rx Not Applicable   Estimated Kt/V  Not Applicable   Access type: Permacath/TDC   Access Issues: No    Report called to primary nurse   Yes / June E CHEYENNE Martin        4000 ml as tolerated, 3.5 hrs, 2K bath  Problem: METABOLIC, FLUID AND ELECTROLYTES - ADULT  Goal: Electrolytes maintained within normal limits  Description: INTERVENTIONS:  - Monitor labs and assess patient for signs and symptoms of electrolyte imbalances  - Administer electrolyte replacement as ordered  - Monitor response to electrolyte replacements, including repeat lab results as appropriate  - Instruct patient on fluid and nutrition as appropriate  Outcome: Progressing  Goal: Fluid balance maintained  Description: INTERVENTIONS:  - Monitor labs   - Monitor I/O and WT  - Instruct patient on fluid and nutrition as appropriate  - Assess for signs & symptoms of volume excess or deficit  Outcome: Progressing

## 2024-06-18 NOTE — PROGRESS NOTES
"Progress Note - Cardiology   [facility]     Joaquin Frankel 44 y.o. male MRN: [id number]  : 1980  Unit/Bed#: [address] [place] [address] Encounter: [id number]    Assessment and Plan:   1. CLOVIS: patient presented with creatinine of six on day of admission 6/3/2024 and peaked at 10.4.    -   Started on CRRT transition to intermittent hemodialysis per nephrology during hospitalization    -   has right IJ primary Placed on 2024    -   managed per nephrology     2. Diabetes: hemoglobin A1c 5.7    -    managed per primary team     3. Hypertension: patient had been on Coreg but was transition to Toprol-XL due to hypotension.    -   Procardia XL 30 mg daily started on 2024 by nephrology, blood pressure greatly improved    -   continue to monitor blood pressure     4. Chronic lymphedema: venous duplex of lower extremities currently pending    -   would benefit from lymphedema pumps and therapy     5. Morbid obesity: BMI 62       Subjective / Objective:       Vitals: Blood pressure 128/60, pulse 58, temperature 97.5 °F (36.4 °C), temperature source Oral, resp. rate 18, height 5' 7\" (1.702 m), weight (!) 185 kg (408 lb), SpO2 95%.  Vitals:    24 0600 24 0550   Weight: (!) 180 kg (396 lb 6.4 oz) (!) 185 kg (408 lb)     Body mass index is 63.9 kg/m².  BP Readings from Last 3 Encounters:   24 128/60   24 134/74   24 144/72     Orthostatic Blood Pressures      Flowsheet Row Most Recent Value   Blood Pressure 128/60 filed at 2024 1400   Patient Position - Orthostatic VS Lying filed at 2024 1910          I/O          0701   0700  0701   0700  0701   0700    P.O. 100 600     I.V. (mL/kg) 10 (0.1)  200 (1.1)    Total Intake(mL/kg) 110 (0.6) 600 (3.2) 200 (1.1)    Other       Total Output       Net +110 +600 +200                 Invasive Devices       Peripheral Intravenous Line  Duration             Peripheral IV 24 Dorsal " (posterior);Right Hand <1 day              Hemodialysis Catheter  Duration             HD Permanent Double Catheter 3 days                      Intake/Output Summary (Last 24 hours) at 6/18/2024 1421  Last data filed at 6/18/2024 1210  Gross per 24 hour   Intake 440 ml   Output --   Net 440 ml         Physical Exam:   Physical Exam  Vitals and nursing note reviewed.   Constitutional:       Appearance: Normal appearance. He is morbidly obese.   Eyes:      General: No scleral icterus.        Right eye: No discharge.         Left eye: No discharge.   Cardiovascular:      Rate and Rhythm: Normal rate and regular rhythm.      Pulses: Normal pulses.   Pulmonary:      Effort: Pulmonary effort is normal.      Breath sounds: Normal breath sounds.   Abdominal:      General: Bowel sounds are normal. There is no distension.      Palpations: Abdomen is soft.   Musculoskeletal:      Right lower leg: Edema present.      Left lower leg: Edema present.      Comments: Component of lymphedema   Skin:     General: Skin is warm and dry.      Capillary Refill: Capillary refill takes less than 2 seconds.   Neurological:      General: No focal deficit present.      Mental Status: He is alert and oriented to person, place, and time. Mental status is at baseline.   Psychiatric:         Mood and Affect: Mood normal.           Medications/ Allergies:     Current Facility-Administered Medications   Medication Dose Route Frequency Provider Last Rate    acetaminophen  975 mg Oral Q8H [facility] Alyssa Levy MD      atorvastatin  80 mg Oral Daily PETRA Briceño      cefazolin  2,000 mg Intravenous Once per day on Tuesday Thursday Saturday Dennis Waterman MD      Cholecalciferol  5,000 Units Oral Daily PETRA Briceño      cyanocobalamin  1,000 mcg Oral Daily PETRA Briceño      docusate sodium  100 mg Oral BID PETRA Briceño      gabapentin  300 mg Oral BID Holly Sifuentes  CRNP      heparin (porcine)  5,000 Units Subcutaneous Q8H [facility] Bear, PETRA      HYDROmorphone  0.5 mg Intravenous Q4H PRN Dennis Waterman MD      insulin lispro  1-6 Units Subcutaneous HS [facility] Bear, CRNP      insulin lispro  2-12 Units Subcutaneous TID AC [facility] Bear, PETRA      lidocaine  1 patch Topical Daily [facility] Bear, PETRA      metoprolol succinate  25 mg Oral Daily Jose De Jesus Cardozo PA-C      midodrine  5 [facility]      NIFEdipine  30 mg Oral Daily Joselyn Reyes Bahamonde, MD      nystatin  1 Application Topical BID [facility], PETRA      ondansetron  4 mg Intravenous Q6H PRN [facility] Bear, TOYANP      oxyCODONE  5 mg Oral Q6H PRN Dennis Waterman MD      polyethylene glycol  17 g Oral Daily [facility], PETRA      senna  1 tablet Oral HS [facility], PETRA      sodium hypochlorite  1 Application Irrigation Daily [facility], PETRA       HYDROmorphone, 0.5 mg, Q4H PRN  midodrine, 5 mg, Before Dialysis  ondansetron, 4 mg, Q6H PRN  oxyCODONE, 5 mg, Q6H PRN  polyethylene glycol, 17 g, Daily PRN      Allergies   Allergen Reactions    Keflex [Cephalexin] Facial Swelling and Lip Swelling    Amoxicillin Hives     childhood       VTE Pharmacologic Prophylaxis:   Sequential compression device (Venodyne)     Labs:     CBC with diff:  Results from last 7 days   Lab Units 06/18/24  0603 06/17/24  0450 06/16/24  0510 06/14/24  0751 06/13/24  0640 06/12/24  0631   WBC Thousand/uL 12.61* 11.74* 12.57* 11.18* 11.51* 10.22*   HEMOGLOBIN g/dL 7.5* 7.5* 7.7* 7.6* 7.0* 7.3*   HEMATOCRIT % 24.2* 23.6* 24.2* 24.1* 22.8* 23.0*   MCV fL 90 89 90 90 90 89   PLATELETS Thousands/uL 378 346 316 303 284 298   RBC Million/uL 2.68* 2.66* 2.70* 2.69* 2.53* 2.58*   MCH pg 28.0 28.2 28.5 28.3 27.7 28.3   MCHC g/dL 31.0* 31.8 31.8 31.5 30.7* 31.7   RDW % 14.3 14.2 14.2  14.2 14.5 14.6   MPV fL 8.5* 8.2* 8.1* 8.2* 8.1* 8.4*     CMP:  Results from last 7 days   Lab Units 06/18/24  0603 06/17/24  0450 06/16/24  0510 06/14/24  0751 06/13/24  0640 06/12/24  0631   SODIUM mmol/L 127* 129* 128* 130* 131* 128*   POTASSIUM mmol/L 5.0 4.5 4.0 4.1 4.4 4.0   CHLORIDE mmol/L 91* 93* 91* 93* 96 101   CO2 mmol/L 23 27 27 27 24 26   ANION GAP mmol/L 13 9 10 10 11 1*   BUN mg/dL 66* 56* 48* 48* 67* 52*   CREATININE mg/dL 11.18* 9.48* 8.07* 7.63* 8.94* 7.10*   CALCIUM mg/dL 8.3* 8.1* 8.0* 8.0* 8.0* 7.9*   EGFR ml/min/1.73sq m 4 6 7 7 6 8     Magnesium:  Results from last 7 days   Lab Units 06/12/24  0631   MAGNESIUM mg/dL 2.1     Coags:  Results from last 7 days   Lab Units 06/12/24  0631   INR  1.13       Imaging & Testing   I have personally reviewed pertinent reports.     VAS VENOUS DUPLEX - LOWER LIMB BILATERAL    Result Date: 6/17/2024  Narrative:  [facility] REPORT CLINICAL: Indications: Patient presents with worsening bilateral lower extremity edema x a few weeks. Operative History: 2024-06-14 Right Tunneled central line Coronary angioplasty with stent placement Risk Factors The patient has history of Obesity, HTN, Diabetes, Hyperlipidemia, CAD, CKD, and previous smoking (quit 1-5yrs ago).   CONCLUSION:  Impression:  RIGHT LOWER LIMB: No gross evidence of acute or chronic deep vein thrombosis. No gross evidence of superficial thrombophlebitis noted. Doppler evaluation shows a normal response to augmentation maneuvers. Popliteal, posterior tibial and anterior tibial arterial Doppler waveforms are triphasic  LEFT LOWER LIMB: No gross evidence of acute or chronic deep vein thrombosis. No gross evidence of superficial thrombophlebitis noted. Doppler evaluation shows a normal response to augmentation maneuvers. Popliteal, posterior tibial and anterior tibial arterial Doppler waveforms are triphasic/hyperemic.  Tech Note: There is an echogenic structure located in the left inguinal region  measuring approximately 3.31cm suggestive of enlarged lymphatic channels.  Technical findings were faxed to chart. Technically difficult/limited study. Some segments may be poorly visualized on today's exam.  SIGNATURE: Electronically Signed by: ANDREA TISH on 2024-06-17 08:12:45 PM    IR tunneled central line placement    Result Date: 6/14/2024  Narrative: PROCEDURE: Nontunneled central venous catheter removal. Tunneled dialysis catheter placement. Procedural Personnel Attending physician(s): Radames Liu MD Pre-procedure diagnosis: Anuric CLOVIS on CKD on HD without evidence of renal recovery Post-procedure diagnosis: Same Clinical History: 44-year-old man with history of acute kidney injury on pre-existing chronic kidney disease, who had a temporary dialysis catheter placed by me on 6/6/2024. On 6/10/2024, a small tear/hole was noted in the catheter I had placed, and that catheter was removed. The critical care service placed a new temporary dialysis catheter on that date. He has had no evidence of renal recovery and requires transition to a tunneled dialysis catheter. PROCEDURE SUMMARY: - Removal of nontunneled central venous catheter - Right internal jugular vein access under real-time ultrasound guidance - Tunneled dialysis catheter placement PROCEDURE DETAILS: Pre-procedure Consent: Informed consent for the procedure including risks, benefits and alternatives was obtained and time-out was performed prior to the procedure. Preparation: The site was prepared and draped using maximal sterile barrier technique including cutaneous antisepsis. Anesthesia/sedation Level of anesthesia/sedation: Moderate sedation (conscious sedation) Anesthesia/sedation administered by: Independent trained observer under attending supervision with continuous monitoring of the patient's level of consciousness and physiologic status Total intra-service sedation time: 1 hour Technique and Findings: Initial fluoroscopic  image demonstrated a temporary dialysis catheter in place, with tip terminating in the mid superior vena cava. The access trajectory was not amenable to conversion to a tunneled catheter. The temporary dialysis catheter was removed and hemostasis obtained with gentle manual pressure. Ultrasound demonstrated a widely patent right internal jugular vein, appropriate for access. Local anesthetic was administered. A dermatotomy was made. Under real-time ultrasound guidance, a 21-gauge micropuncture needle was advanced into the right internal jugular vein from a lateral approach. A permanent image was saved. A microwire was advanced and the needle exchanged for the micropuncture transitional dilator. This access was secured with a flow switch. An appropriate length tunneled dialysis catheter was selected. Local anesthetic was administered along the planned tunnel tract. A dermatotomy was made on the chest. The catheter was tunneled from the chest incision to the right neck incision. Attention was returned to the vascular access. The microwire was exchanged for a 0.035 inch guidewire. The access site was serially dilated and a peel-away sheath was placed. The catheter was inserted via the sheath, which was then peeled away. Completion image demonstrated that the tip terminated in the right atrium. The catheter was checked and both lumens provided brisk flow for dialysis. The catheter was secured with nonabsorbable suture, both lumens flushed and capped per protocol, and a sterile dressing was applied. Contrast None Radiation Dose Fluoroscopy time: 1.1 min Reference air kerma: 20 mGy Additional Details Specimens removed: None Estimated blood loss (mL): Less than 10 Complications: No immediate complications.     Impression: Successful placement of 15.5 Liberian by 32 cm right chest/right internal jugular vein tunneled hemodialysis catheter. The tip terminates in the right atrium. The catheter is ready for immediate use.  Workstation performed: [id number]     US bedside procedure    Result Date: 6/13/2024  Narrative: [id number]    IR biopsy bone marrow    Result Date: 6/13/2024  Narrative: PROCEDURE: CT-guided bone marrow biopsy and bone marrow aspiration STAFF: Geovani Rodarte M.D. DOSE LENGTH PRODUCT: 1292.30 mGy-cm. This examination, like all CT scans performed in the [facility], was performed utilizing techniques to minimize radiation dose exposure, including the use of iterative reconstruction and automated exposure control. NUMBER OF IMAGES: Multiple. COMPLICATIONS: None. MEDICATIONS: 1% lidocaine Moderate sedation was monitored by radiology nursing staff.  Monitoring of conscious sedation totaled 0 minutes. INDICATION: Rule out multiple myeloma PROCEDURE: Using intermittent CT guidance, the Family[employer] system was used to perform bone marrow aspirate via the left posterior superior iliac spine. Approximately 6 mL of marrow was removed and sent to pathology. Next, bone marrow biopsy was performed. The biopsy specimen was placed in formalin and sent to pathology. The needle was then removed and hemostasis was achieved using manual compression.     Impression: Bone marrow aspiration and biopsy. Workstation performed: [id number]     XR chest portable ICU    Result Date: 6/11/2024  Narrative: XR CHEST PORTABLE ICU INDICATION: status post right IJ temporary hemodialysis catheter placement. COMPARISON: 6/8/2024 FINDINGS: Right internal jugular dialysis catheter projects over the SVC. Clear lungs. There is vascular congestion. No pneumothorax or pleural effusion. Enlarged cardiac silhouette, unchanged. Bones are unremarkable for age. Normal upper abdomen.     Impression: 1.  Right internal jugular dialysis catheter projects over the SVC. No pneumothorax. 2.  Cardiomegaly with vascular congestion. Workstation performed: [id number]     CT chest abdomen pelvis wo contrast    Result  Date: 6/10/2024  Narrative: CT CHEST, ABDOMEN AND PELVIS WITHOUT IV CONTRAST INDICATION: hypotension, hypervolemic. COMPARISON: 6/3/2024 TECHNIQUE: CT examination of the chest, abdomen and pelvis was performed without intravenous contrast. Multiplanar 2D reformatted images were created from the source data. This examination, like all CT scans performed in the [facility], was performed utilizing techniques to minimize radiation dose exposure, including the use of iterative reconstruction and automated exposure control. Radiation dose length product (DLP) for this visit: 1673 mGy-cm Enteric Contrast: Not administered. FINDINGS: CHEST LUNGS: Lungs are clear. No tracheal or endobronchial lesion. PLEURA: Small left greater than right basilar effusions with compressive atelectasis HEART/GREAT VESSELS: Heart is unremarkable for patient's age. No thoracic aortic aneurysm. MEDIASTINUM AND SLAVA: Unremarkable. CHEST WALL AND LOWER NECK: Unremarkable. ABDOMEN LIVER/BILIARY TREE: Enlarged fatty liver noted. GALLBLADDER: No calcified gallstones. No pericholecystic inflammatory change. SPLEEN: The spleen appears enlarged. PANCREAS: Unremarkable. ADRENAL GLANDS: Unremarkable. KIDNEYS/URETERS: Unremarkable. No hydronephrosis. STOMACH AND BOWEL: Moderate fecal stasis. APPENDIX: Normal. ABDOMINOPELVIC CAVITY: Mildly prominent left greater than right inguinal nodes. Left greater than right iliac chain adenopathy is redemonstrated and stable. Small periaortic and mesenteric root nodes are also identified. VESSELS: Unremarkable for patient's age. PELVIS REPRODUCTIVE ORGANS: Unremarkable for patient's age. URINARY BLADDER: Decompressed with a Robbins catheter in place ABDOMINAL WALL/INGUINAL REGIONS: Right central line tip terminating at the caval atrial junction. BONES: Age-appropriate degenerative change without acute osseous abnormality.     Impression: 1. Small left greater than right basilar effusions with associated  compressive atelectasis. Lungs otherwise clear. 2. Periaortic retroperitoneal, left greater than right iliac chain and inguinal lymph nodes identified, stable and nonspecific. 3. No acute findings. Workstation performed: [id number]     XR chest portable    Result Date: 6/9/2024  Narrative: XR CHEST PORTABLE INDICATION: Fever and leukocytosis. COMPARISON: Chest CT 6/9/2024 and CXR 6/3/2024. FINDINGS: Right jugular catheter in right atrium. Mild pulmonary venous congestion. No pneumothorax or pleural effusion. Mild cardiomegaly. Bones are unremarkable for age. Normal upper abdomen.     Impression: Mild pulmonary venous congestion. Nothing to suggest pneumonia. Workstation performed: [id number]     CT lower extremity wo contrast left    Result Date: 6/9/2024  Narrative: CT left lower extremity without IV contrast INDICATION: severe pain. COMPARISON: None. TECHNIQUE: CT examination of the left lower extremity from the hip to the foot was performed. This examination, like all CT scans performed in the [facility], was performed utilizing techniques to minimize radiation dose exposure, including the use of iterative reconstruction and automated exposure control software.  Multiplanar 2D reformatted images were created from the source data. Rad dose  1394.86 mGy-cm FINDINGS: OSSEOUS STRUCTURES:  No fracture, dislocation or destructive osseous lesion. No osseous erosion. There is mild to moderate left hip osteoarthrosis. There is moderate to severe tricompartmental osteoarthrosis of the knee. VISUALIZED MUSCULATURE:  Unremarkable. SOFT TISSUES: There is prominent subcutaneous edema and skin thickening in the thigh greater medially. There is severe circumferential subcutaneous edema and skin thickening throughout the lower leg extending into the dorsum of the foot. This may either reflect bland edema or extensive cellulitis. A well-defined rim-enhancing fluid collection not seen to suggest a discrete  abscess. No soft tissue gas. OTHER PERTINENT FINDINGS:  None.     Impression: Extensive subcutaneous edema and skin thickening as described above. This may either reflect bland edema or extensive cellulitis. A well-defined rim-enhancing fluid collection not seen to suggest a discrete abscess. No soft tissue gas. Osteoarthrosis as above. No osseous erosion. Workstation performed: [id number]     IR temporary dialysis catheter placement    Result Date: 6/7/2024  Narrative: PROCEDURE: Temporary dialysis catheter placement Procedural Personnel Attending physician(s): Radames Liu MD Pre-procedure diagnosis: CLOVIS on CKD Post-procedure diagnosis: Same Clinical History: 44-year-old man with history of acute kidney injury on pre-existing chronic kidney disease, now with uremic symptoms and need to initiate hemodialysis. PROCEDURE SUMMARY: Ultrasound and fluoroscopy guided temporary dialysis catheter placement PROCEDURE DETAILS: Pre-procedure Consent: Informed consent for the procedure including risks, benefits and alternatives was obtained and time-out was performed prior to the procedure. Preparation: The site was prepared and draped using maximal sterile barrier technique including cutaneous antisepsis. Anesthesia/sedation Level of anesthesia/sedation: No sedation Technique and Findings: Immediate preprocedure ultrasound demonstrated a large, dilated, and mildly difficult to compress right internal jugular vein, consistent with patient's fluid overload. Local anesthetic was administered. A dermatotomy was made. Access was obtained using a 21-gauge micropuncture needle under real-time ultrasound guidance, taking a course amenable for future conversion to tunneled dialysis catheter. A microwire was advanced to the right atrium and the needle exchanged for a micropuncture transitional dilator. The intravascular distance was measured with the microwire. A 24 cm temporary dialysis catheter was selected. An Amplatz  wire was advanced to the inferior vena cava. The access tract was serially dilated and the temporary dialysis catheter was advanced over the wire into the right atrium. There was significant oozing/bleeding around the catheter, consistent with impaired hemostasis in the setting of uremia. This was refractory to application of pressure. Hemostasis was obtained with a 3-0 Vicryl pursestring suture around the catheter tract. The catheter was secured with nonabsorbable suture. Completion image demonstrated appropriate positioning of the newly placed catheter within the right atrium. The catheter was checked and both lumens provided brisk flow. Contrast Contrast dose: None Radiation Dose Fluoroscopy time: 1.5 MIN Reference air kerma: 86 mGy Additional Details Specimens removed: None Estimated blood loss (mL): 200 Complications: No immediate complications.     Impression: 1. Uncomplicated nontunneled/temporary dialysis catheter placement (14 Yi by 24 cm). Catheter tip terminates in the right atrium. The catheter is ready for immediate use. 2. Impaired hemostasis was discussed with Dr. Timmons of Nephrology. DDAVP will be administered to help mitigate rebleeding risk. Workstation performed: [id number]     Echo complete w/ contrast if indicated    Result Date: 6/4/2024  Narrative:   Left Ventricle: Left ventricular cavity size is normal. Wall thickness is moderately increased. There is moderate concentric hypertrophy. The left ventricular ejection fraction is 58% by visual estimation. Systolic function is normal. Wall motion is normal. Diastolic function is mildly abnormal, consistent with grade I (abnormal) relaxation.  Left atrial filling pressure is normal.   Left Atrium: The atrium is mildly dilated.   Right Atrium: The atrium is mildly dilated.   Mitral Valve: There is mild regurgitation.   Tricuspid Valve: There is mild regurgitation. The right ventricular systolic pressure is moderately elevated. The estimated  right ventricular systolic pressure is 58.00 mmHg.   Pulmonic Valve: There is mild regurgitation.   Compared to previous exam, there is no significant change.     CT chest abdomen pelvis wo contrast    Result Date: 6/3/2024  Narrative: CT CHEST, ABDOMEN AND PELVIS WITHOUT IV CONTRAST INDICATION: Dyspnea and renal failure. COMPARISON: CT of the chest from 2/17/2024, CT of the abdomen and pelvis from 5/4/2022, and CT of the chest, abdomen and pelvis from 12/1/2022. TECHNIQUE: CT examination of the chest, abdomen and pelvis was performed without intravenous contrast due to elevated creatinine. Multiplanar 2D reformatted images were created from the source data. This examination, like all CT scans performed in the [facility], was performed utilizing techniques to minimize radiation dose exposure, including the use of iterative reconstruction and automated exposure control. Radiation dose length product (DLP) for this visit:  2830 mGy-cm Enteric Contrast: Enteric contrast was not administered. FINDINGS: Evaluation of visceral structures and vasculature is limited by lack of intravenous contrast. CHEST BRONCHOPULMONARY:  Clear central airways. There is slight mosaic attenuation. There are areas of atelectasis/or scarring in the lower lobes, similar to February 2024. PLEURA: Small left pleural effusion. No pneumothorax. HEART/GREAT VESSELS: The heart is normal in size. No pericardial effusion. Normal caliber thoracic aorta. MEDIASTINUM/LYMPH NODES:  No axillary or mediastinal lymphadenopathy by size criteria. Nonspecific subcentimeter mediastinal lymph nodes are seen. Evaluation for hilar lymphadenopathy is limited without IV contrast. CHEST WALL AND LOWER NECK:  Unremarkable. ABDOMEN LIVER/BILIARY TREE:  Unremarkable unenhanced appearance of the liver. No biliary ductal dilation. GALLBLADDER:  No calcified gallstones. No pericholecystic inflammatory change. SPLEEN: Unremarkable unenhanced appearance.  PANCREAS:  Unremarkable unenhanced appearance. No dilation of the main pancreatic duct. ADRENAL GLANDS:  Unremarkable unenhanced appearance. KIDNEYS/URETERS:  No intrarenal or ureteral calculi. No hydronephrosis. No definite focal renal lesions. STOMACH AND BOWEL:  Evaluation of the gastrointestinal tract is somewhat limited by underdistention and lack of oral contrast. No bowel obstruction or convincing inflammation. APPENDIX: Normal appendix. ABDOMINOPELVIC CAVITY:  No ascites or pneumoperitoneum. LYMPH NODES: There are several enlarged left external iliac lymph nodes measuring up to 2.1 cm in short axis. There is also an enlarged left inguinal lymph node measuring 1.5 cm in short axis and several additional smaller left inguinal lymph nodes. These are increased compared to the May 2022 CT. No other abdominal or pelvic lymphadenopathy by size criteria. Bilateral right external iliac lymph nodes are prominent, however below size threshold for lymphadenopathy. VESSELS: Normal caliber aorta. PELVIS REPRODUCTIVE ORGANS:  The prostate gland is not enlarged. URINARY BLADDER:  Unremarkable. ABDOMINAL WALL/INGUINAL REGIONS: No ventral abdominal wall hernia. In large portion of the subcutaneous tissues of the anterior abdominal wall are outside of the field-of-view. There is subcutaneous tissue stranding and skin thickening of the pannus as on the prior studies. There is a partially imaged tubular mildly hyperdense lesion along the left side of the undersurface of the pannus in the skin or immediately deep to the skin, which has been present dating back to at least 2021. MUSCULOSKELETAL:  No focal aggressive osseous lesions. Degenerative changes of the spine. There is a healing, subacute fracture of the right 7th rib anteriorly.     Impression: No acute pulmonary pathology. No acute abdominal or pelvic pathology within limitation of a noncontrast study. In particular, no hydronephrosis. Left inguinal and left external  "iliac lymphadenopathy, increased from May 2022. This may be reactive to a process in the left lower extremity or in the partially imaged pannus. Additional findings as above. Workstation performed: [id number]     NM lung ventilation / perfusion    Result Date: 6/3/2024  Narrative: VENTILATION AND PERFUSION SCAN INDICATION: Shortness of breath, hypoxia, increased D-dimer, left lower extremity swelling COMPARISON:  Chest radiograph 6/3/2024 TECHNIQUE:  Posterior ventilation imaging was performed after the inhalation of 32 mCi nebulized Tc-99m DTPA with deposition of less than 1 mCi of activity within the lungs.  Multiplanar perfusion imaging was next performed following the intravenous administration of 4.2 mCi Tc-99m labeled MAA. FINDINGS: Ventilation imaging demonstrates a minimally heterogeneous distribution of radiopharmaceutical throughout both lungs. There is central deposition of the radiotracer. Perfusion imaging demonstrates a mildly heterogeneous distribution of the radiopharmaceutical throughout both lungs. There is mildly decreased perfusion activity in the left lung as compared to the right, but matching the ventilation pattern. There is no  significant  segmental or subsegmental defect.     Impression: The probability for pulmonary embolus is low. Workstation performed: [id number]     XR chest 1 view portable    Result Date: 6/3/2024  Narrative: XR CHEST PORTABLE INDICATION: chest pain, shortness of breath. COMPARISON: Chest radiograph February 15, 2024 FINDINGS: Clear lungs. No pneumothorax or pleural effusion. Normal cardiomediastinal silhouette. Bones are unremarkable for age. Normal upper abdomen.     Impression: No acute cardiopulmonary disease. Workstation performed: [id number]         Nathalie LAMBERT  Cardiology      \"This note was completed in part utilizing m-modal fluency direct voice recognition software.   Grammatical errors, random word insertion, spelling mistakes, and incomplete " "sentences may be an occasional consequence of the system secondary to software limitations, ambient noise and hardware issues.    Please read the chart carefully and recognize, using context, where substitutions have occurred.  If you have any questions or concerns about the context, text or information contained within the body of this dictation, please contact myself, the provider, for further clarification.\"  "

## 2024-06-18 NOTE — PLAN OF CARE
Problem: Prexisting or High Potential for Compromised Skin Integrity  Goal: Skin integrity is maintained or improved  Description: INTERVENTIONS:  - Identify patients at risk for skin breakdown  - Assess and monitor skin integrity  - Assess and monitor nutrition and hydration status  - Monitor labs   - Assess for incontinence   - Turn and reposition patient  - Assist with mobility/ambulation  - Relieve pressure over bony prominences  - Avoid friction and shearing  - Provide appropriate hygiene as needed including keeping skin clean and dry  - Evaluate need for skin moisturizer/barrier cream  - Collaborate with interdisciplinary team   - Patient/family teaching  - Consider wound care consult   Outcome: Progressing     Problem: PAIN - ADULT  Goal: Verbalizes/displays adequate comfort level or baseline comfort level  Description: Interventions:  - Encourage patient to monitor pain and request assistance  - Assess pain using appropriate pain scale  - Administer analgesics based on type and severity of pain and evaluate response  - Implement non-pharmacological measures as appropriate and evaluate response  - Consider cultural and social influences on pain and pain management  - Notify physician/advanced practitioner if interventions unsuccessful or patient reports new pain  Outcome: Progressing     Problem: INFECTION - ADULT  Goal: Absence or prevention of progression during hospitalization  Description: INTERVENTIONS:  - Assess and monitor for signs and symptoms of infection  - Monitor lab/diagnostic results  - Monitor all insertion sites, i.e. indwelling lines, tubes, and drains  - [place] appropriate cooling/warming therapies per order  - Administer medications as ordered  - Instruct and encourage patient and family to use good hand hygiene technique  - Identify and instruct in appropriate isolation precautions for identified infection/condition  Outcome: Progressing     Problem: SAFETY ADULT  Goal: Patient will  remain free of falls  Description: INTERVENTIONS:  - Educate patient/family on patient safety including physical limitations  - Instruct patient to call for assistance with activity   - Consult OT/PT to assist with strengthening/mobility   - Keep Call bell within reach  - Keep bed low and locked with side rails adjusted as appropriate  - Keep care items and personal belongings within reach  - Initiate and maintain comfort rounds  - Make Fall Risk Sign visible to staff  - Apply yellow socks and bracelet for high fall risk patients  - Consider moving patient to room near nurses station  Outcome: Progressing  Goal: Maintain or return to baseline ADL function  Description: INTERVENTIONS:  -  Assess patient's ability to carry out ADLs; assess patient's baseline for ADL function and identify physical deficits which impact ability to perform ADLs (bathing, care of mouth/teeth, toileting, grooming, dressing, etc.)  - Assess/evaluate cause of self-care deficits   - Assess range of motion  - Assess patient's mobility; develop plan if impaired  - Assess patient's need for assistive devices and provide as appropriate  - Encourage maximum independence but intervene and supervise when necessary  - Involve family in performance of ADLs  - Assess for home care needs following discharge   - Consider OT consult to assist with ADL evaluation and planning for discharge  - Provide patient education as appropriate  Outcome: Progressing  Goal: Maintains/Returns to pre admission functional level  Description: INTERVENTIONS:  - Perform AM-PAC 6 Click Basic Mobility/ Daily Activity assessment daily.  - Set and communicate daily mobility goal to care team and patient/family/caregiver.   - Collaborate with rehabilitation services on mobility goals if consulted  - Out of bed for toileting  - Record patient progress and toleration of activity level   Outcome: Progressing     Problem: SKIN/TISSUE INTEGRITY - ADULT  Goal: Incision(s), wounds(s) or  drain site(s) healing without S/S of infection  Description: INTERVENTIONS  - Assess and document dressing, incision, wound bed, drain sites and surrounding tissue  - Provide patient and family education  - Perform skin care/dressing changes  Outcome: Progressing  Goal: Skin Integrity remains intact(Skin Breakdown Prevention)  Description: Assess:  -Perform Juan assessment every ***  -Clean and moisturize skin every ***  -Inspect skin when repositioning, toileting, and assisting with ADLS  -Assess under medical devices such as *** every ***  -Assess extremities for adequate circulation and sensation     Bed Management:  -Have minimal linens on bed & keep smooth, unwrinkled  -Change linens as needed when moist or perspiring  -Avoid sitting or lying in one position for more than 2 hours while in bed  -Keep HOB at 30degrees     Toileting:  -Offer bedside commode  -Assess for incontinence every 2  -Use incontinent care products after each incontinent episode such as moisture paste    Activity:  -Mobilize patient 2 times a day  -Encourage activity and walks on unit  -Encourage or provide ROM exercises   -Turn and reposition patient every 2 Hours  -Use appropriate equipment to lift or move patient in bed  -Instruct/ Assist with weight shifting every 2 when out of bed in chair  -Consider limitation of chair time 2 hour intervals    Skin Care:  -Avoid use of baby powder, tape, friction and shearing, hot water or constrictive clothing  -Relieve pressure over bony prominences using ***  -Do not massage red bony areas    Next Steps:  -Teach patient strategies to minimize risks such as ***   -Consider consults to  interdisciplinary teams such as ***  Outcome: Progressing

## 2024-06-18 NOTE — PLAN OF CARE
Problem: Prexisting or High Potential for Compromised Skin Integrity  Goal: Skin integrity is maintained or improved  Description: INTERVENTIONS:  - Identify patients at risk for skin breakdown  - Assess and monitor skin integrity  - Assess and monitor nutrition and hydration status  - Monitor labs   - Assess for incontinence   - Turn and reposition patient  - Assist with mobility/ambulation  - Relieve pressure over bony prominences  - Avoid friction and shearing  - Provide appropriate hygiene as needed including keeping skin clean and dry  - Evaluate need for skin moisturizer/barrier cream  - Collaborate with interdisciplinary team   - Patient/family teaching  - Consider wound care consult   Outcome: Progressing     Problem: INFECTION - ADULT  Goal: Absence or prevention of progression during hospitalization  Description: INTERVENTIONS:  - Assess and monitor for signs and symptoms of infection  - Monitor lab/diagnostic results  - Monitor all insertion sites, i.e. indwelling lines, tubes, and drains  - [place] appropriate cooling/warming therapies per order  - Administer medications as ordered  - Instruct and encourage patient and family to use good hand hygiene technique  - Identify and instruct in appropriate isolation precautions for identified infection/condition  Outcome: Progressing     Problem: SAFETY ADULT  Goal: Patient will remain free of falls  Description: INTERVENTIONS:  - Educate patient/family on patient safety including physical limitations  - Instruct patient to call for assistance with activity   - Consult OT/PT to assist with strengthening/mobility   - Keep Call bell within reach  - Keep bed low and locked with side rails adjusted as appropriate  - Keep care items and personal belongings within reach  - Initiate and maintain comfort rounds  - Make Fall Risk Sign visible to staff  - Apply yellow socks and bracelet for high fall risk patients  - Consider moving patient to room near nurses  station  Outcome: Progressing     Problem: CARDIOVASCULAR - ADULT  Goal: Maintains optimal cardiac output and hemodynamic stability  Description: INTERVENTIONS:  - Monitor I/O, vital signs and rhythm  - Monitor for S/S and trends of decreased cardiac output  - Administer and titrate ordered vasoactive medications to optimize hemodynamic stability  - Assess quality of pulses, skin color and temperature  - Assess for signs of decreased coronary artery perfusion  - Instruct patient to report change in severity of symptoms  Outcome: Progressing  Goal: Absence of cardiac dysrhythmias or at baseline rhythm  Description: INTERVENTIONS:  - Continuous cardiac monitoring, vital signs, obtain 12 lead EKG if ordered  - Administer antiarrhythmic and heart rate control medications as ordered  - Monitor electrolytes and administer replacement therapy as ordered  Outcome: Progressing     Problem: RESPIRATORY - ADULT  Goal: Achieves optimal ventilation and oxygenation  Description: INTERVENTIONS:  - Assess for changes in respiratory status  - Assess for changes in mentation and behavior  - Position to facilitate oxygenation and minimize respiratory effort  - Oxygen administered by appropriate delivery if ordered  - Initiate smoking cessation education as indicated  - Encourage broncho-pulmonary hygiene including cough, deep breathe, Incentive Spirometry  - Assess the need for suctioning and aspirate as needed  - Assess and instruct to report SOB or any respiratory difficulty  - Respiratory Therapy support as indicated  Outcome: Progressing     Problem: SKIN/TISSUE INTEGRITY - ADULT  Goal: Incision(s), wounds(s) or drain site(s) healing without S/S of infection  Description: INTERVENTIONS  - Assess and document dressing, incision, wound bed, drain sites and surrounding tissue  - Provide patient and family education  - Perform skin care/dressing changes  Outcome: Progressing  Goal: Skin Integrity remains intact(Skin Breakdown  Prevention)  Description: Assess:  -Perform Juan assessment every shift  -Clean and moisturize skin every shift  -Inspect skin when repositioning, toileting, and assisting with ADLS  -Assess under medical devices such as masimo, N\C every shift  -Assess extremities for adequate circulation and sensation     Bed Management:  -Have minimal linens on bed & keep smooth, unwrinkled  -Change linens as needed when moist or perspiring  -Avoid sitting or lying in one position for more than *** hours while in bed  -Keep HOB at ***degrees     Toileting:  -Offer bedside commode  -Assess for incontinence every ***  -Use incontinent care products after each incontinent episode such as ***    Activity:  -Mobilize patient *** times a day  -Encourage activity and walks on unit  -Encourage or provide ROM exercises   -Turn and reposition patient every *** Hours  -Use appropriate equipment to lift or move patient in bed  -Instruct/ Assist with weight shifting every *** when out of bed in chair  -Consider limitation of chair time *** hour intervals    Skin Care:  -Avoid use of baby powder, tape, friction and shearing, hot water or constrictive clothing  -Relieve pressure over bony prominences using ***  -Do not massage red bony areas    Next Steps:  -Teach patient strategies to minimize risks such as ***   -Consider consults to  interdisciplinary teams such as ***  Outcome: Progressing     Problem: METABOLIC, FLUID AND ELECTROLYTES - ADULT  Goal: Electrolytes maintained within normal limits  Description: INTERVENTIONS:  - Monitor labs and assess patient for signs and symptoms of electrolyte imbalances  - Administer electrolyte replacement as ordered  - Monitor response to electrolyte replacements, including repeat lab results as appropriate  - Instruct patient on fluid and nutrition as appropriate  Outcome: Progressing  Goal: Fluid balance maintained  Description: INTERVENTIONS:  - Monitor labs   - Monitor I/O and WT  - Instruct  patient on fluid and nutrition as appropriate  - Assess for signs & symptoms of volume excess or deficit  Outcome: Progressing  Goal: Glucose maintained within target range  Description: INTERVENTIONS:  - Monitor Blood Glucose as ordered  - Assess for signs and symptoms of hyperglycemia and hypoglycemia  - Administer ordered medications to maintain glucose within target range  - Assess nutritional intake and initiate nutrition service referral as needed  Outcome: Progressing     Problem: MUSCULOSKELETAL - ADULT  Goal: Maintain or return mobility to safest level of function  Description: INTERVENTIONS:  - Assess patient's ability to carry out ADLs; assess patient's baseline for ADL function and identify physical deficits which impact ability to perform ADLs (bathing, care of mouth/teeth, toileting, grooming, dressing, etc.)  - Assess/evaluate cause of self-care deficits   - Assess range of motion  - Assess patient's mobility  - Assess patient's need for assistive devices and provide as appropriate  - Encourage maximum independence but intervene and supervise when necessary  - Involve family in performance of ADLs  - Assess for home care needs following discharge   - Consider OT consult to assist with ADL evaluation and planning for discharge  - Provide patient education as appropriate  Outcome: Progressing  Goal: Maintain proper alignment of affected body part  Description: INTERVENTIONS:  - Support, maintain and protect limb and body alignment  - Provide patient/ family with appropriate education  Outcome: Progressing

## 2024-06-19 LAB
ANION GAP SERPL CALCULATED.3IONS-SCNC: 12 MMOL/L (ref 4–13)
ANISOCYTOSIS BLD QL SMEAR: PRESENT
BASO STIPL BLD QL SMEAR: PRESENT
BASOPHILS # BLD MANUAL: 0 THOUSAND/UL (ref 0–0.1)
BASOPHILS NFR MAR MANUAL: 0 % (ref 0–1)
BUN SERPL-MCNC: 48 MG/DL (ref 5–25)
CALCIUM SERPL-MCNC: 8.4 MG/DL (ref 8.4–10.2)
CHLORIDE SERPL-SCNC: 92 MMOL/L (ref 96–108)
CO2 SERPL-SCNC: 25 MMOL/L (ref 21–32)
CREAT SERPL-MCNC: 9.3 MG/DL (ref 0.6–1.3)
EOSINOPHIL # BLD MANUAL: 0.38 THOUSAND/UL (ref 0–0.4)
EOSINOPHIL NFR BLD MANUAL: 3 % (ref 0–6)
ERYTHROCYTE [DISTWIDTH] IN BLOOD BY AUTOMATED COUNT: 14.3 % (ref 11.6–15.1)
GFR SERPL CREATININE-BSD FRML MDRD: 6 ML/MIN/1.73SQ M
GLUCOSE SERPL-MCNC: 103 MG/DL (ref 65–140)
GLUCOSE SERPL-MCNC: 104 MG/DL (ref 65–140)
GLUCOSE SERPL-MCNC: 105 MG/DL (ref 65–140)
GLUCOSE SERPL-MCNC: 107 MG/DL (ref 65–140)
GLUCOSE SERPL-MCNC: 116 MG/DL (ref 65–140)
HCT VFR BLD AUTO: 25.2 % (ref 36.5–49.3)
HGB BLD-MCNC: 8 G/DL (ref 12–17)
LYMPHOCYTES # BLD AUTO: 1.79 THOUSAND/UL (ref 0.6–4.47)
LYMPHOCYTES # BLD AUTO: 14 % (ref 14–44)
MCH RBC QN AUTO: 28.1 PG (ref 26.8–34.3)
MCHC RBC AUTO-ENTMCNC: 31.7 G/DL (ref 31.4–37.4)
MCV RBC AUTO: 88 FL (ref 82–98)
METAMYELOCYTE ABSOLUTE CT: 0.13 THOUSAND/UL (ref 0–0.1)
METAMYELOCYTES NFR BLD MANUAL: 1 % (ref 0–1)
MONOCYTES # BLD AUTO: 0.38 THOUSAND/UL (ref 0–1.22)
MONOCYTES NFR BLD: 3 % (ref 4–12)
NEUTROPHILS # BLD MANUAL: 10.13 THOUSAND/UL (ref 1.85–7.62)
NEUTS BAND NFR BLD MANUAL: 1 % (ref 0–8)
NEUTS SEG NFR BLD AUTO: 78 % (ref 43–75)
PLATELET # BLD AUTO: 405 THOUSANDS/UL (ref 149–390)
PLATELET BLD QL SMEAR: ABNORMAL
PMV BLD AUTO: 8.4 FL (ref 8.9–12.7)
POLYCHROMASIA BLD QL SMEAR: PRESENT
POTASSIUM SERPL-SCNC: 4.7 MMOL/L (ref 3.5–5.3)
RBC # BLD AUTO: 2.85 MILLION/UL (ref 3.88–5.62)
RBC MORPH BLD: PRESENT
SODIUM SERPL-SCNC: 129 MMOL/L (ref 135–147)
WBC # BLD AUTO: 12.82 THOUSAND/UL (ref 4.31–10.16)

## 2024-06-19 PROCEDURE — 85027 COMPLETE CBC AUTOMATED: CPT | Performed by: STUDENT IN AN ORGANIZED HEALTH CARE EDUCATION/TRAINING PROGRAM

## 2024-06-19 PROCEDURE — 85007 BL SMEAR W/DIFF WBC COUNT: CPT | Performed by: STUDENT IN AN ORGANIZED HEALTH CARE EDUCATION/TRAINING PROGRAM

## 2024-06-19 PROCEDURE — 99233 SBSQ HOSP IP/OBS HIGH 50: CPT | Performed by: INTERNAL MEDICINE

## 2024-06-19 PROCEDURE — 99232 SBSQ HOSP IP/OBS MODERATE 35: CPT | Performed by: INTERNAL MEDICINE

## 2024-06-19 PROCEDURE — 99232 SBSQ HOSP IP/OBS MODERATE 35: CPT | Performed by: STUDENT IN AN ORGANIZED HEALTH CARE EDUCATION/TRAINING PROGRAM

## 2024-06-19 PROCEDURE — 80048 BASIC METABOLIC PNL TOTAL CA: CPT | Performed by: STUDENT IN AN ORGANIZED HEALTH CARE EDUCATION/TRAINING PROGRAM

## 2024-06-19 PROCEDURE — 82948 REAGENT STRIP/BLOOD GLUCOSE: CPT

## 2024-06-19 RX ORDER — TORSEMIDE 20 MG/1
40 TABLET ORAL DAILY
Status: DISCONTINUED | OUTPATIENT
Start: 2024-06-19 | End: 2024-06-26 | Stop reason: HOSPADM

## 2024-06-19 RX ORDER — OXYCODONE HYDROCHLORIDE 5 MG/1
5 TABLET ORAL ONCE
Status: COMPLETED | OUTPATIENT
Start: 2024-06-19 | End: 2024-06-19

## 2024-06-19 RX ADMIN — NIFEDIPINE 30 MG: 30 TABLET, EXTENDED RELEASE ORAL at 08:28

## 2024-06-19 RX ADMIN — HEPARIN SODIUM 5000 UNITS: 5000 INJECTION, SOLUTION INTRAVENOUS; SUBCUTANEOUS at 13:13

## 2024-06-19 RX ADMIN — HYDROMORPHONE HYDROCHLORIDE 0.5 MG: 1 INJECTION, SOLUTION INTRAMUSCULAR; INTRAVENOUS; SUBCUTANEOUS at 02:55

## 2024-06-19 RX ADMIN — ACETAMINOPHEN 975 MG: 325 TABLET ORAL at 05:12

## 2024-06-19 RX ADMIN — HEPARIN SODIUM 5000 UNITS: 5000 INJECTION, SOLUTION INTRAVENOUS; SUBCUTANEOUS at 05:12

## 2024-06-19 RX ADMIN — ATORVASTATIN CALCIUM 80 MG: 80 TABLET, FILM COATED ORAL at 08:28

## 2024-06-19 RX ADMIN — TORSEMIDE 40 MG: 20 TABLET ORAL at 09:43

## 2024-06-19 RX ADMIN — Medication 5000 UNITS: at 08:28

## 2024-06-19 RX ADMIN — HYDROMORPHONE HYDROCHLORIDE 0.5 MG: 1 INJECTION, SOLUTION INTRAMUSCULAR; INTRAVENOUS; SUBCUTANEOUS at 20:17

## 2024-06-19 RX ADMIN — OXYCODONE HYDROCHLORIDE 5 MG: 5 TABLET ORAL at 14:22

## 2024-06-19 RX ADMIN — HYDROMORPHONE HYDROCHLORIDE 0.5 MG: 1 INJECTION, SOLUTION INTRAMUSCULAR; INTRAVENOUS; SUBCUTANEOUS at 13:40

## 2024-06-19 RX ADMIN — HEPARIN SODIUM 5000 UNITS: 5000 INJECTION, SOLUTION INTRAVENOUS; SUBCUTANEOUS at 21:38

## 2024-06-19 RX ADMIN — OXYCODONE HYDROCHLORIDE 5 MG: 5 TABLET ORAL at 05:34

## 2024-06-19 RX ADMIN — HYDROMORPHONE HYDROCHLORIDE 0.5 MG: 1 INJECTION, SOLUTION INTRAMUSCULAR; INTRAVENOUS; SUBCUTANEOUS at 08:28

## 2024-06-19 RX ADMIN — HYOSCYAMINE SULFATE 1 APPLICATION: 16 SOLUTION at 09:43

## 2024-06-19 RX ADMIN — CYANOCOBALAMIN TAB 500 MCG 1000 MCG: 500 TAB at 08:28

## 2024-06-19 RX ADMIN — ONDANSETRON 4 MG: 2 INJECTION INTRAMUSCULAR; INTRAVENOUS at 03:02

## 2024-06-19 RX ADMIN — OXYCODONE HYDROCHLORIDE 5 MG: 5 TABLET ORAL at 18:24

## 2024-06-19 RX ADMIN — NYSTATIN 1 APPLICATION: 100000 POWDER TOPICAL at 17:13

## 2024-06-19 RX ADMIN — NYSTATIN 1 APPLICATION: 100000 POWDER TOPICAL at 09:43

## 2024-06-19 RX ADMIN — OXYCODONE HYDROCHLORIDE 5 MG: 5 TABLET ORAL at 11:57

## 2024-06-19 RX ADMIN — METOPROLOL SUCCINATE 25 MG: 25 TABLET, EXTENDED RELEASE ORAL at 08:28

## 2024-06-19 NOTE — PLAN OF CARE
Problem: Prexisting or High Potential for Compromised Skin Integrity  Goal: Skin integrity is maintained or improved  Description: INTERVENTIONS:  - Identify patients at risk for skin breakdown  - Assess and monitor skin integrity  - Assess and monitor nutrition and hydration status  - Monitor labs   - Assess for incontinence   - Turn and reposition patient  - Assist with mobility/ambulation  - Relieve pressure over bony prominences  - Avoid friction and shearing  - Provide appropriate hygiene as needed including keeping skin clean and dry  - Evaluate need for skin moisturizer/barrier cream  - Collaborate with interdisciplinary team   - Patient/family teaching  - Consider wound care consult   Outcome: Progressing     Problem: PAIN - ADULT  Goal: Verbalizes/displays adequate comfort level or baseline comfort level  Description: Interventions:  - Encourage patient to monitor pain and request assistance  - Assess pain using appropriate pain scale  - Administer analgesics based on type and severity of pain and evaluate response  - Implement non-pharmacological measures as appropriate and evaluate response  - Consider cultural and social influences on pain and pain management  - Notify physician/advanced practitioner if interventions unsuccessful or patient reports new pain  Outcome: Progressing     Problem: INFECTION - ADULT  Goal: Absence or prevention of progression during hospitalization  Description: INTERVENTIONS:  - Assess and monitor for signs and symptoms of infection  - Monitor lab/diagnostic results  - Monitor all insertion sites, i.e. indwelling lines, tubes, and drains  - [place] appropriate cooling/warming therapies per order  - Administer medications as ordered  - Instruct and encourage patient and family to use good hand hygiene technique  - Identify and instruct in appropriate isolation precautions for identified infection/condition  Outcome: Progressing     Problem: SAFETY ADULT  Goal: Patient will  remain free of falls  Description: INTERVENTIONS:  - Educate patient/family on patient safety including physical limitations  - Instruct patient to call for assistance with activity   - Consult OT/PT to assist with strengthening/mobility   - Keep Call bell within reach  - Keep bed low and locked with side rails adjusted as appropriate  - Keep care items and personal belongings within reach  - Initiate and maintain comfort rounds  - Make Fall Risk Sign visible to staff  - Apply yellow socks and bracelet for high fall risk patients  - Consider moving patient to room near nurses station  Outcome: Progressing  Goal: Maintain or return to baseline ADL function  Description: INTERVENTIONS:  -  Assess patient's ability to carry out ADLs; assess patient's baseline for ADL function and identify physical deficits which impact ability to perform ADLs (bathing, care of mouth/teeth, toileting, grooming, dressing, etc.)  - Assess/evaluate cause of self-care deficits   - Assess range of motion  - Assess patient's mobility; develop plan if impaired  - Assess patient's need for assistive devices and provide as appropriate  - Encourage maximum independence but intervene and supervise when necessary  - Involve family in performance of ADLs  - Assess for home care needs following discharge   - Consider OT consult to assist with ADL evaluation and planning for discharge  - Provide patient education as appropriate  Outcome: Progressing  Goal: Maintains/Returns to pre admission functional level  Description: INTERVENTIONS:  - Perform AM-PAC 6 Click Basic Mobility/ Daily Activity assessment daily.  - Set and communicate daily mobility goal to care team and patient/family/caregiver.   - Collaborate with rehabilitation services on mobility goals if consulted  - Out of bed for toileting  - Record patient progress and toleration of activity level   Outcome: Progressing     Problem: CARDIOVASCULAR - ADULT  Goal: Maintains optimal cardiac output  and hemodynamic stability  Description: INTERVENTIONS:  - Monitor I/O, vital signs and rhythm  - Monitor for S/S and trends of decreased cardiac output  - Administer and titrate ordered vasoactive medications to optimize hemodynamic stability  - Assess quality of pulses, skin color and temperature  - Assess for signs of decreased coronary artery perfusion  - Instruct patient to report change in severity of symptoms  Outcome: Progressing  Goal: Absence of cardiac dysrhythmias or at baseline rhythm  Description: INTERVENTIONS:  - Continuous cardiac monitoring, vital signs, obtain 12 lead EKG if ordered  - Administer antiarrhythmic and heart rate control medications as ordered  - Monitor electrolytes and administer replacement therapy as ordered  Outcome: Progressing     Problem: RESPIRATORY - ADULT  Goal: Achieves optimal ventilation and oxygenation  Description: INTERVENTIONS:  - Assess for changes in respiratory status  - Assess for changes in mentation and behavior  - Position to facilitate oxygenation and minimize respiratory effort  - Oxygen administered by appropriate delivery if ordered  - Initiate smoking cessation education as indicated  - Encourage broncho-pulmonary hygiene including cough, deep breathe, Incentive Spirometry  - Assess the need for suctioning and aspirate as needed  - Assess and instruct to report SOB or any respiratory difficulty  - Respiratory Therapy support as indicated  Outcome: Progressing     Problem: SKIN/TISSUE INTEGRITY - ADULT  Goal: Incision(s), wounds(s) or drain site(s) healing without S/S of infection  Description: INTERVENTIONS  - Assess and document dressing, incision, wound bed, drain sites and surrounding tissue  - Provide patient and family education  - Perform skin care/dressing changes  Outcome: Progressing     Problem: NEUROSENSORY - ADULT  Goal: Achieves maximal functionality and self care  Description: INTERVENTIONS  - Monitor swallowing and airway patency with  patient fatigue and changes in neurological status  - Encourage and assist patient to increase activity and self care.   - Encourage visually impaired, hearing impaired and aphasic patients to use assistive/communication devices  Outcome: Progressing     Problem: METABOLIC, FLUID AND ELECTROLYTES - ADULT  Goal: Electrolytes maintained within normal limits  Description: INTERVENTIONS:  - Monitor labs and assess patient for signs and symptoms of electrolyte imbalances  - Administer electrolyte replacement as ordered  - Monitor response to electrolyte replacements, including repeat lab results as appropriate  - Instruct patient on fluid and nutrition as appropriate  Outcome: Progressing  Goal: Fluid balance maintained  Description: INTERVENTIONS:  - Monitor labs   - Monitor I/O and WT  - Instruct patient on fluid and nutrition as appropriate  - Assess for signs & symptoms of volume excess or deficit  Outcome: Progressing  Goal: Glucose maintained within target range  Description: INTERVENTIONS:  - Monitor Blood Glucose as ordered  - Assess for signs and symptoms of hyperglycemia and hypoglycemia  - Administer ordered medications to maintain glucose within target range  - Assess nutritional intake and initiate nutrition service referral as needed  Outcome: Progressing     Problem: MUSCULOSKELETAL - ADULT  Goal: Maintain or return mobility to safest level of function  Description: INTERVENTIONS:  - Assess patient's ability to carry out ADLs; assess patient's baseline for ADL function and identify physical deficits which impact ability to perform ADLs (bathing, care of mouth/teeth, toileting, grooming, dressing, etc.)  - Assess/evaluate cause of self-care deficits   - Assess range of motion  - Assess patient's mobility  - Assess patient's need for assistive devices and provide as appropriate  - Encourage maximum independence but intervene and supervise when necessary  - Involve family in performance of ADLs  - Assess for  home care needs following discharge   - Consider OT consult to assist with ADL evaluation and planning for discharge  - Provide patient education as appropriate  Outcome: Progressing  Goal: Maintain proper alignment of affected body part  Description: INTERVENTIONS:  - Support, maintain and protect limb and body alignment  - Provide patient/ family with appropriate education  Outcome: Progressing

## 2024-06-19 NOTE — ASSESSMENT & PLAN NOTE
Patient has anemia of chronic disease likely secondary to underlying nephropathy,?  Myeloma.      He did have some episodes of hematuria.  No further episodes. No further overt bleeding    HG down trended to 6.7 g/dL, s/p 2 PRBC on 6/11, 6/23  Hg 7.1  B12 310, continue supplementation  Iron panel shows iron deficiency.    S/P IV venofer 200mg EOD  transfuse for hemoglobin less than 7.0 or drastic drop in hemoglobin  May need EPO outpatient.

## 2024-06-19 NOTE — PROGRESS NOTES
NEPHROLOGY PROGRESS NOTE   Joaquin Frankel 44 y.o. male MRN: [id number]  Unit/Bed#: [address] [place] [address] Encounter: [id number]  Reason for Consult: ARF      SUMMARY:    43 yo man with PMH of morbid obesity, lower extremity lymphedema, diabetes, hypertension, CAD p/w shortness of breath for the last 2 weeks.  Nephrology is consulted for management of CLOVIS        ASSESSMENT and PLAN:     Acute renal failure, currently dialysis dependent  -- Present on admission with suspected underlying chronic kidney disease as prior baseline creatinine was 1.3 to 1.6 mg/dL with previous episodes of acute kidney injury.  Admission creatinine 6 mg/dL  -- Peak creatinine was 10.4 mg/dL prior to dialysis.  -- Initially started on CRRT then transition to intermittent hemodialysis.  Renal placement initiation on June 7th  -- Serologies: ANCA titer was negative, anti-GBM was negative.  JOSE ROBERTO/anti-double-stranded DNA was negative.  Kappa/lambda ratio was within normal limits.  Serum and urine immunofixation showed IgG kappa monoclonal bands.  Complement C3 and C4 were normal.  Beta-2 microglobulin was elevated.  -- Renal imaging via CAT scan showed no evidence of hydronephrosis  -- Access: New right IJ permacath was placed on Friday by interventional radiology.  Temporary HD catheter was removed  -- Status post bone marrow biopsy on June 13.  Awaiting results.  If evidence of multiple myeloma we will then proceed with management plan as per hematology.  If bone marrow biopsy is negative will consider a renal biopsy.  Suspect MGRS  -- Currently dialyzing on a TTS schedule.  Plan for next dialysis tomorrow.  Underwent dialysis yesterday June 18 was able to ultrafiltrate 4 L yesterday  -- Continuing to monitor for renal recovery.  She noted some increased urine output this morning  --Will start torsemide 40 mg daily today.  Will discontinue nifedipine to prevent hypotension.  His blood pressure seems to be improving with ultrafiltration     Chronic  kidney disease stage III  -- Prior baseline creatinine 1.3 to 1.6 mg/dL  -- Presumably secondary to diabetic kidney disease, hypertensive nephrosclerosis, obesity related renal dysfunction  -- Suspect she will be at a new baseline creatinine if he does recover     Anemia of chronic kidney disease  -- Follow-up bone marrow biopsy  -- SPEP and UPEP positive for IgG kappa monoclonal bands  -- Hematology on board  -- Received IV iron    Hypertension  --Volume mediated, obese with a BMI greater than 60  --Blood pressure much improved with ultrafiltration.  Currently on nifedipine along with metoprolol  --Patient reported some improved urine output this morning will start torsemide 40 mg daily and monitor response will hold off on nifedipine at this time and monitor blood pressure     Mineral bone disorder-chronic kidney disease  -- Secondary hyperparathyroidism of renal origin.  -- Vitamin D deficiency  -- Hyperphosphatemia  -- Patient started on vitamin D supplementation  --Continue current management     Hyponatremia  --Sodium 129 this morning  --Volume mediated  --Continue fluid restriction  --Monitor with ultrafiltration on dialysis  --Start torsemide 40 mg daily     Systemic inflammatory response syndrome  -- CT of the left lower extremity showed extensive subcutaneous edema and skin thickening  -- Blood cultures are negative  -- Currently on IV Ancef post HD for 1 more dose tomorrow  -- Draining abscess under pannus on June 13 grew out beta-hemolytic Streptococcus group C     Chronic lymphedema     Morbid obesity  -- BMI 62       SUBJECTIVE / INTERVAL HISTORY:    Patient reported some increased urine output this morning.  Patient continues to feel better with ultrafiltration on dialysis    OBJECTIVE:  Current Weight: Weight - Scale: (!) 184 kg (406 lb 1.4 oz)  Vitals:    06/18/24 2216 06/19/24 0535 06/19/24 0716 06/19/24 0824   BP: 121/63  106/69 133/76   BP Location:       Pulse: 71  60 64   Resp: 18  16    Temp:  98.2 °F (36.8 °C)  97.5 °F (36.4 °C)    TempSrc:       SpO2: 91%  96% 92%   Weight:  (!) 184 kg (406 lb 1.4 oz)     Height:           Intake/Output Summary (Last 24 hours) at 6/19/2024 0922  Last data filed at 6/18/2024 1540  Gross per 24 hour   Intake 500 ml   Output 4500 ml   Net -4000 ml       Review of Systems:    Constitutional: Negative for chills and fever.   HENT: Negative for ear pain and sore throat.    Eyes: Negative for pain and visual disturbance.   Respiratory: Negative for cough and shortness of breath.    Cardiovascular: Negative for chest pain and palpitations.   Gastrointestinal: Negative for abdominal pain and vomiting.   Genitourinary: Negative for dysuria and hematuria.   Musculoskeletal: Negative for arthralgias and back pain.   Skin: Negative for color change and rash.   Neurological: Negative for seizures and syncope.   12 point ROS has been reviewed.    Physical Exam  Vitals and nursing note reviewed.   Constitutional:       General: He is not in acute distress.     Appearance: He is well-developed. He is obese.   HENT:      Head: Normocephalic and atraumatic.   Eyes:      General: No scleral icterus.     Conjunctiva/sclera: Conjunctivae normal.      Pupils: Pupils are equal, round, and reactive to light.   Cardiovascular:      Rate and Rhythm: Normal rate and regular rhythm.      Heart sounds: S1 normal and S2 normal. No murmur heard.     No friction rub. No gallop.   Pulmonary:      Effort: Pulmonary effort is normal. No respiratory distress.      Breath sounds: Normal breath sounds. No wheezing or rales.   Abdominal:      General: Bowel sounds are normal.      Palpations: Abdomen is soft.      Tenderness: There is no abdominal tenderness. There is no rebound.   Musculoskeletal:         General: Swelling present. Normal range of motion.      Cervical back: Normal range of motion and neck supple.      Right lower leg: Edema present.      Left lower leg: Edema present.   Skin:     Findings:  No rash.   Neurological:      Mental Status: He is alert and oriented to person, place, and time.   Psychiatric:         Behavior: Behavior normal.         Medications:    Current Facility-Administered Medications:     atorvastatin (LIPITOR) tablet 80 mg, 80 mg, Oral, Daily, PETRA Briceño, 80 mg at 06/19/24 0828    Cholecalciferol (VITAMIN D3) tablet 5,000 Units, 5,000 Units, Oral, Daily, PETRA Briceño, 5,000 Units at 06/19/24 0828    cyanocobalamin (VITAMIN B-12) tablet 1,000 mcg, 1,000 mcg, Oral, Daily, Holly Sifuentes, PETRA, 1,000 mcg at 06/19/24 0828    docusate sodium (COLACE) capsule 100 mg, 100 mg, Oral, BID, Holly Sifuentes, PETRA, 100 mg at 06/17/24 0857    gabapentin (NEURONTIN) capsule 300 mg, 300 mg, Oral, BID, PETRA Briceño, 300 mg at 06/17/24 0857    heparin (porcine) subcutaneous injection 5,000 Units, 5,000 Units, Subcutaneous, Q8H SHAZIA, PETRA Briceño, 5,000 Units at 06/19/24 0512    HYDROmorphone (DILAUDID) injection 0.5 mg, 0.5 mg, Intravenous, Q4H PRN, Dennis Waterman MD, 0.5 mg at 06/19/24 0828    insulin lispro (HumALOG/ADMELOG) 100 units/mL subcutaneous injection 1-6 Units, 1-6 Units, Subcutaneous, HS, PETRA Briceño, 1 Units at 06/06/24 2150    insulin lispro (HumALOG/ADMELOG) 100 units/mL subcutaneous injection 2-12 Units, 2-12 Units, Subcutaneous, TID AC, 2 Units at 06/08/24 1720 **AND** Fingerstick Glucose (POCT), , , TID AC, PETRA Briceño    lidocaine (LIDODERM) 5 % patch 1 patch, 1 patch, Topical, Daily, PETRA Briceño, 1 patch at 06/17/24 0857    metoprolol succinate (TOPROL-XL) 24 hr tablet 25 mg, 25 mg, Oral, Daily, Jose De Jesus Cardozo PA-C, 25 mg at 06/19/24 0828    midodrine (PROAMATINE) tablet 5 mg, 5 mg, Oral, Before Dialysis, PETRA Briceño, 5 mg at 06/11/24 1317    nystatin (MYCOSTATIN) powder 1 Application, 1 Application,  Topical, BID, PETRA Briceño, 1 Application at 06/18/24 1829    ondansetron (ZOFRAN) injection 4 mg, 4 mg, Intravenous, Q6H PRN, PETRA Briceño, 4 mg at 06/19/24 0302    oxyCODONE (ROXICODONE) IR tablet 5 mg, 5 mg, Oral, Q6H PRN, Dnenis Waterman MD, 5 mg at 06/19/24 0534    polyethylene glycol (MIRALAX) packet 17 g, 17 g, Oral, Daily PRN, PETRA Briceño    senna (SENOKOT) tablet 8.6 mg, 1 tablet, Oral, HS, PETRA Briceño, 8.6 mg at 06/17/24 2114    sodium hypochlorite (DAKIN'S HALF-STRENGTH) 0.25 percent topical solution 1 Application, 1 Application, Irrigation, Daily, PETRA Briceño, 1 Application at 06/18/24 0932    torsemide (DEMADEX) tablet 40 mg, 40 mg, Oral, Daily, Kylie Kendall MD    Laboratory Results:  Results from last 7 days   Lab Units 06/19/24  0307 06/19/24  0301 06/18/24  0603 06/17/24  0450 06/16/24  0510 06/14/24  0751 06/13/24  0640   WBC Thousand/uL  --  12.82* 12.61* 11.74* 12.57* 11.18* 11.51*   HEMOGLOBIN g/dL  --  8.0* 7.5* 7.5* 7.7* 7.6* 7.0*   HEMATOCRIT %  --  25.2* 24.2* 23.6* 24.2* 24.1* 22.8*   PLATELETS Thousands/uL  --  405* 378 346 316 303 284   POTASSIUM mmol/L 4.7  --  5.0 4.5 4.0 4.1 4.4   CHLORIDE mmol/L 92*  --  91* 93* 91* 93* 96   CO2 mmol/L 25  --  23 27 27 27 24   BUN mg/dL 48*  --  66* 56* 48* 48* 67*   CREATININE mg/dL 9.30*  --  11.18* 9.48* 8.07* 7.63* 8.94*   CALCIUM mg/dL 8.4  --  8.3* 8.1* 8.0* 8.0* 8.0*       PLEASE NOTE:  This encounter was completed utilizing the [employer] Speech Voice Recognition Software. Grammatical errors, random word insertions, pronoun errors and incomplete sentences are occasional consequences of the system due to software limitations, ambient noise and hardware issues.These may be missed by proof reading prior to affixing electronic signature. Any questions or concerns about the content, text or information contained within the body of this  dictation should be directly addressed to the physician for clarification. Please do not hesitate to call me directly if you have any any questions or concerns.

## 2024-06-19 NOTE — ASSESSMENT & PLAN NOTE
POA Cr 6.40 >11.36 > 9.46> 11.31 >8.97 >10.79    BL CR 1.3-1.4  Initiated on HD on 6/7  UA: Hematuria, proteinuria.  UA CR - 5280  Serologies: ANCA titer was negative, anti-GBM was negative.  JOSE ROBERTO/anti-double-stranded DNA was negative.  Kappa/lambda ratio was within normal limits.  Serum and urine immunofixation showed IgG kappa monoclonal bands.  Complement C3 and C4 were normal.  Beta-2 microglobulin was elevated.  CT: No evidence of hydronephrosis  D/W recs nephrology:  Suspect MGRS  Inpatient HD TTS schedule.    Plan for next dialysis tomorrow> OP HD MWF  At this time no evidence of renal recovery.  Continue dialysis schedule.  Continue Demadex  F/U bone marrow biopsies/renal biopsy

## 2024-06-19 NOTE — ASSESSMENT & PLAN NOTE
Held amlodipine and carvedilol for low blood pressure initially  Requiring midodrine predialysis as needed  Blood pressure stable/improving at present  Nephro recs following:    Started Toprol-XL 25 mg daily  Started on torsemide 40 mg daily per nephrology  Also on Imdur 60 mg daily.   Monitor intake output, daily weight  Monitor blood pressure

## 2024-06-19 NOTE — PLAN OF CARE
Problem: Prexisting or High Potential for Compromised Skin Integrity  Goal: Skin integrity is maintained or improved  Description: INTERVENTIONS:  - Identify patients at risk for skin breakdown  - Assess and monitor skin integrity  - Assess and monitor nutrition and hydration status  - Monitor labs   - Assess for incontinence   - Turn and reposition patient  - Assist with mobility/ambulation  - Relieve pressure over bony prominences  - Avoid friction and shearing  - Provide appropriate hygiene as needed including keeping skin clean and dry  - Evaluate need for skin moisturizer/barrier cream  - Collaborate with interdisciplinary team   - Patient/family teaching  - Consider wound care consult   Outcome: Progressing     Problem: PAIN - ADULT  Goal: Verbalizes/displays adequate comfort level or baseline comfort level  Description: Interventions:  - Encourage patient to monitor pain and request assistance  - Assess pain using appropriate pain scale  - Administer analgesics based on type and severity of pain and evaluate response  - Implement non-pharmacological measures as appropriate and evaluate response  - Consider cultural and social influences on pain and pain management  - Notify physician/advanced practitioner if interventions unsuccessful or patient reports new pain  Outcome: Progressing     Problem: INFECTION - ADULT  Goal: Absence or prevention of progression during hospitalization  Description: INTERVENTIONS:  - Assess and monitor for signs and symptoms of infection  - Monitor lab/diagnostic results  - Monitor all insertion sites, i.e. indwelling lines, tubes, and drains  - Cuervo appropriate cooling/warming therapies per order  - Administer medications as ordered  - Instruct and encourage patient and family to use good hand hygiene technique  - Identify and instruct in appropriate isolation precautions for identified infection/condition  Outcome: Progressing     Problem: SAFETY ADULT  Goal: Patient will  remain free of falls  Description: INTERVENTIONS:  - Educate patient/family on patient safety including physical limitations  - Instruct patient to call for assistance with activity   - Consult OT/PT to assist with strengthening/mobility   - Keep Call bell within reach  - Keep bed low and locked with side rails adjusted as appropriate  - Keep care items and personal belongings within reach  - Initiate and maintain comfort rounds  - Make Fall Risk Sign visible to staff  - Apply yellow socks and bracelet for high fall risk patients  - Consider moving patient to room near nurses station  Outcome: Progressing  Goal: Maintain or return to baseline ADL function  Description: INTERVENTIONS:  -  Assess patient's ability to carry out ADLs; assess patient's baseline for ADL function and identify physical deficits which impact ability to perform ADLs (bathing, care of mouth/teeth, toileting, grooming, dressing, etc.)  - Assess/evaluate cause of self-care deficits   - Assess range of motion  - Assess patient's mobility; develop plan if impaired  - Assess patient's need for assistive devices and provide as appropriate  - Encourage maximum independence but intervene and supervise when necessary  - Involve family in performance of ADLs  - Assess for home care needs following discharge   - Consider OT consult to assist with ADL evaluation and planning for discharge  - Provide patient education as appropriate  Outcome: Progressing  Goal: Maintains/Returns to pre admission functional level  Description: INTERVENTIONS:  - Perform AM-PAC 6 Click Basic Mobility/ Daily Activity assessment daily.  - Set and communicate daily mobility goal to care team and patient/family/caregiver.   - Collaborate with rehabilitation services on mobility goals if consulted  - Out of bed for toileting  - Record patient progress and toleration of activity level   Outcome: Progressing     Problem: CARDIOVASCULAR - ADULT  Goal: Maintains optimal cardiac output  and hemodynamic stability  Description: INTERVENTIONS:  - Monitor I/O, vital signs and rhythm  - Monitor for S/S and trends of decreased cardiac output  - Administer and titrate ordered vasoactive medications to optimize hemodynamic stability  - Assess quality of pulses, skin color and temperature  - Assess for signs of decreased coronary artery perfusion  - Instruct patient to report change in severity of symptoms  Outcome: Progressing  Goal: Absence of cardiac dysrhythmias or at baseline rhythm  Description: INTERVENTIONS:  - Continuous cardiac monitoring, vital signs, obtain 12 lead EKG if ordered  - Administer antiarrhythmic and heart rate control medications as ordered  - Monitor electrolytes and administer replacement therapy as ordered  Outcome: Progressing     Problem: RESPIRATORY - ADULT  Goal: Achieves optimal ventilation and oxygenation  Description: INTERVENTIONS:  - Assess for changes in respiratory status  - Assess for changes in mentation and behavior  - Position to facilitate oxygenation and minimize respiratory effort  - Oxygen administered by appropriate delivery if ordered  - Initiate smoking cessation education as indicated  - Encourage broncho-pulmonary hygiene including cough, deep breathe, Incentive Spirometry  - Assess the need for suctioning and aspirate as needed  - Assess and instruct to report SOB or any respiratory difficulty  - Respiratory Therapy support as indicated  Outcome: Progressing     Problem: SKIN/TISSUE INTEGRITY - ADULT  Goal: Incision(s), wounds(s) or drain site(s) healing without S/S of infection  Description: INTERVENTIONS  - Assess and document dressing, incision, wound bed, drain sites and surrounding tissue  - Provide patient and family education  - Perform skin care/dressing changes  Outcome: Progressing  Goal: Skin Integrity remains intact(Skin Breakdown Prevention)  Description: Assess:  -Perform Juan assessment every shift   -Clean and moisturize skin every  shift  -Inspect skin when repositioning, toileting, and assisting with ADLS  -Assess under medical devices   -Assess extremities for adequate circulation and sensation     Bed Management:  -Have minimal linens on bed & keep smooth, unwrinkled  -Change linens as needed when moist or perspiring  -Avoid sitting or lying in one position for more than 2 hours while in bed  -Keep HOB at 45 degrees     Toileting:  -Offer bedside commode  -Assess for incontinence   -Use incontinent care products after each incontinent episode     Activity:  -Mobilize patient 4 times a day  -Encourage activity and walks on unit  -Encourage or provide ROM exercises   -Turn and reposition patient every 2 Hours  -Use appropriate equipment to lift or move patient in bed  -Instruct/ Assist with weight shifting every offloading when out of bed in chair  -Consider limitation of chair time 2 hour intervals    Skin Care:  -Avoid use of baby powder, tape, friction and shearing, hot water or constrictive clothing  -Relieve pressure over bony prominences   -Do not massage red bony areas    Next Steps:  -Teach patient strategies to minimize risks    -Consider consults to  interdisciplinary teams   Outcome: Progressing     Problem: NEUROSENSORY - ADULT  Goal: Achieves maximal functionality and self care  Description: INTERVENTIONS  - Monitor swallowing and airway patency with patient fatigue and changes in neurological status  - Encourage and assist patient to increase activity and self care.   - Encourage visually impaired, hearing impaired and aphasic patients to use assistive/communication devices  Outcome: Progressing     Problem: METABOLIC, FLUID AND ELECTROLYTES - ADULT  Goal: Electrolytes maintained within normal limits  Description: INTERVENTIONS:  - Monitor labs and assess patient for signs and symptoms of electrolyte imbalances  - Administer electrolyte replacement as ordered  - Monitor response to electrolyte replacements, including repeat lab  results as appropriate  - Instruct patient on fluid and nutrition as appropriate  Outcome: Progressing  Goal: Fluid balance maintained  Description: INTERVENTIONS:  - Monitor labs   - Monitor I/O and WT  - Instruct patient on fluid and nutrition as appropriate  - Assess for signs & symptoms of volume excess or deficit  Outcome: Progressing  Goal: Glucose maintained within target range  Description: INTERVENTIONS:  - Monitor Blood Glucose as ordered  - Assess for signs and symptoms of hyperglycemia and hypoglycemia  - Administer ordered medications to maintain glucose within target range  - Assess nutritional intake and initiate nutrition service referral as needed  Outcome: Progressing     Problem: MUSCULOSKELETAL - ADULT  Goal: Maintain or return mobility to safest level of function  Description: INTERVENTIONS:  - Assess patient's ability to carry out ADLs; assess patient's baseline for ADL function and identify physical deficits which impact ability to perform ADLs (bathing, care of mouth/teeth, toileting, grooming, dressing, etc.)  - Assess/evaluate cause of self-care deficits   - Assess range of motion  - Assess patient's mobility  - Assess patient's need for assistive devices and provide as appropriate  - Encourage maximum independence but intervene and supervise when necessary  - Involve family in performance of ADLs  - Assess for home care needs following discharge   - Consider OT consult to assist with ADL evaluation and planning for discharge  - Provide patient education as appropriate  Outcome: Progressing  Goal: Maintain proper alignment of affected body part  Description: INTERVENTIONS:  - Support, maintain and protect limb and body alignment  - Provide patient/ family with appropriate education  Outcome: Progressing

## 2024-06-19 NOTE — ASSESSMENT & PLAN NOTE
Na-131  2/2 Vollume overload  Continue on HD  D/W nephrology recs:  Per nephro overload nonconcerning  Fluid restrict  HD

## 2024-06-19 NOTE — PROGRESS NOTES
INTERNAL MEDICINE PROGRESS NOTE     Name: Joaquin Frankel   Age & Sex: 44 y.o. male   MRN: [id number]  Unit/Bed#: [address] [place] [address]   Encounter: [id number]  Hospital Stay Days: 16      ASSESSMENT/PLAN     Principal Problem:    Acute renal failure (ARF) (HCC)  Active Problems:    Coronary artery disease    Hypertension    Diabetes (HCC)    Hyponatremia    Morbid obesity (HCC)    Anemia of chronic disease    Chronic acquired lymphedema    Wound of abdomen    SIRS (systemic inflammatory response syndrome) (HCC)      SIRS (systemic inflammatory response syndrome) ([facility])  Assessment & Plan  CT left lower extremity-extensive subcutaneous edema and skin thickening  CT CAP-without any acute abnormality  BCx negative, MRSA surveillance negative  Noted to left lower extremity cellulitis superimposed on lymphedema, now improved  Source likely recurrent draining abscess under the pannus on 6/13.    Cx: Of beta-hemolytic Streptococcus group C  S/P I&D of the pannus by surgery on 6/13.    Continue IV cefazolin post HD, > 6/18  Negative venous Doppler    Wound of abdomen  Assessment & Plan  Seen by wound care, recommended general surgery consultation  Appreciate surgery input  Continue local wound care per wound care nurse  Follow-up in the outpatient wound center    Chronic acquired lymphedema  Assessment & Plan  Patient developed LLE lymphedema after left lower extremity surgical procedure several years ago.  Patient complaining of increasing pain in the left lower extremity likely secondary to volume overload.  Left lower extremity venous Doppler: Negative  Prior referrals ambulatory referral to lymphedema clinic.    Anemia of chronic disease  Assessment & Plan  Patient has anemia of chronic disease likely secondary to underlying nephropathy,?  Myeloma.      He did have some episodes of hematuria.  No further episodes  No further overt bleeding  Hemoglobin gradually down trended to 6.7 g/dL, status post 1 PRBC on 6/11.   Subsequently stable  B12 310, continue supplementation  Iron panel shows iron deficiency.  Status post IV venofer 200mg every other day x 2  Continue to monitor hemoglobin closely and transfuse for hemoglobin less than 7.0 or drastic drop in hemoglobin.    Morbid obesity (HCC)  Assessment & Plan  With BMI of 64.42  Encourage lifestyle modification on discharge    Hyponatremia  Assessment & Plan  Continue to monitor patient is currently on HD, appreciate nephrology recommendations    Diabetes ([facility])  Assessment & Plan  Lab Results   Component Value Date    HGBA1C 5.7 (H) 06/09/2024     Recent Labs     06/18/24  1103 06/18/24  1617 06/18/24 2000 06/19/24  0719   POCGLU 125 119 158* 107       Blood Sugar Average: Last 72 hrs:  (P) [id number]    Amaryl held during his hospitalization.   Acceptable, continue to monitor on current regimen  Continue diabetic diet.      Hypertension  Assessment & Plan  Held amlodipine and carvedilol for low blood pressure initially  Requiring midodrine predialysis as needed  Blood pressure stable/improving at present  Discontinued Bumex as patient remains anuric  Started Toprol-XL 25 mg daily  Started on torsemide 40 mg daily per nephrology  Monitor intake output, daily weight  Monitor blood pressure    Coronary artery disease  Assessment & Plan  Patient with a history of coronary artery disease with multivessel disease status post stents.  Cardiology following, input appreciated  Held aspirin initially for biopsy, subsequently resumed.  Discussed with nephrology, holding aspirin again in case patient requires kidney biopsy  Continue on Toprol-XL  Continue statin    * Acute renal failure (ARF) ([facility])  Assessment & Plan  POA with creatinine of 6.40 >11.18  BL CR 1.3-1.4  Initiated on HD on 6/7  UA: Hematuria, proteinuria.  UA CR - 5280  Serologies: ANCA titer was negative, anti-GBM was negative.  JOSE ROBERTO/anti-double-stranded DNA was negative.  Kappa/lambda ratio was within normal  limits.  Serum and urine immunofixation showed IgG kappa monoclonal bands.  Complement C3 and C4 were normal.  Beta-2 microglobulin was elevated.  CT: No evidence of hydronephrosis  D/W recs nephrology:  Suspect MGRS  HD TTS schedule.  Plan for next dialysis today> OP HD  At this time no evidence of renal recovery.  Continue dialysis schedule.  Once established stable from renal standpoint for discharge. TTS schedule  Follow-up bone marrow biopsies/renal biopsy          VTE Pharmacologic Prophylaxis:   Pharmacologic: Heparin  Mechanical VTE Prophylaxis in Place: No    Patient Centered Rounds: I have performed bedside rounds with nursing staff today.    Discussions with Specialists or Other Care Team Provider: Appreciate nephrology recommendations    Education and Discussions with Family / Patient: Discussed with patient at bedside    Time Spent for Care: 45 minutes.  More than 50% of total time spent on counseling and coordination of care as described above.    Current Length of Stay: 16 day(s)    Current Patient Status: Inpatient   Certification Statement: The patient will continue to require additional inpatient hospital stay due to acute renal failure, new HD    Discharge Plan / Estimated Discharge Date: 48 to 72 hours    Code Status: Level 1 - Full Code    Subjective:     Patient is a pleasant 44-year-old male seen and examined at bedside, overall feeling better today, following with nephrology recommendations, no shortness of breath or chest pain.    Objective:   Vitals:   Temp (24hrs), Av °F (36.7 °C), Min:97.5 °F (36.4 °C), Max:98.3 °F (36.8 °C)    Temp:  [97.5 °F (36.4 °C)-98.3 °F (36.8 °C)] 97.5 °F (36.4 °C)  HR:  [58-82] 62  Resp:  [14-20] 16  BP: (106-145)/(60-78) 112/62  SpO2:  [89 %-97 %] 94 %  Body mass index is 63.6 kg/m².     Input and Output Summary (last 24 hours):     Intake/Output Summary (Last 24 hours) at 2024 1016  Last data filed at 2024 1540  Gross per 24 hour   Intake 500 ml    Output 4500 ml   Net -4000 ml     Physical Exam:   Physical Exam  Constitutional:       General: He is not in acute distress.  HENT:      Head: Normocephalic and atraumatic.   Eyes:      General: No scleral icterus.     Conjunctiva/sclera: Conjunctivae normal.   Cardiovascular:      Rate and Rhythm: Normal rate and regular rhythm.      Pulses: Normal pulses.      Heart sounds: No murmur heard.  Pulmonary:      Effort: Pulmonary effort is normal. No respiratory distress.      Breath sounds: Normal breath sounds.   Abdominal:      General: Bowel sounds are normal. There is no distension.      Palpations: Abdomen is soft.      Tenderness: There is no abdominal tenderness. There is no guarding.   Musculoskeletal:         General: No swelling. Normal range of motion.   Skin:     General: Skin is warm and dry.      Capillary Refill: Capillary refill takes less than 2 seconds.      Coloration: Skin is not jaundiced.   Neurological:      General: No focal deficit present.      Mental Status: He is alert and oriented to person, place, and time. Mental status is at baseline.   Psychiatric:         Mood and Affect: Mood normal.         Behavior: Behavior normal.         Additional Data:   Basic Laboratory Review:   Results from last 7 days   Lab Units 06/19/24  0307 06/18/24  0603 06/17/24  0450   SODIUM mmol/L 129* 127* 129*   POTASSIUM mmol/L 4.7 5.0 4.5   CHLORIDE mmol/L 92* 91* 93*   CO2 mmol/L 25 23 27   BUN mg/dL 48* 66* 56*   CREATININE mg/dL 9.30* 11.18* 9.48*   GLUCOSE RANDOM mg/dL 103 94 108   CALCIUM mg/dL 8.4 8.3* 8.1*   EGFR ml/min/1.73sq m 6 4 6       Results from last 7 days   Lab Units 06/19/24  0301 06/18/24  0603 06/17/24  0450   WBC Thousand/uL 12.82* 12.61* 11.74*   HEMOGLOBIN g/dL 8.0* 7.5* 7.5*   HEMATOCRIT % 25.2* 24.2* 23.6*   PLATELETS Thousands/uL 405* 378 346   LYMPHO PCT % 14  --   --    MONO PCT % 3*  --   --    EOS PCT % 3  --   --    BASOS PCT % 0  --   --    EOS ABS Thousand/uL 0.38  --   --     BASOS ABS Thousand/uL 0.00  --   --                Additional Labs:                 Recent Cultures (last 7 days):   Results from last 7 days   Lab Units 06/13/24  1252   GRAM STAIN RESULT  3+ Polys  No organisms seen   WOUND CULTURE  2+ Growth of Beta Hemolytic Streptococcus Group C*  1+ Growth of       * I Have Reviewed All Lab Data Listed Above.  * Additional Pertinent Lab Tests Reviewed: All Labs Within Last 24 Hours Reviewed    Imaging:  Prior imaging studies and reports reviewed    Last 24 Hours Medication List:   Current Facility-Administered Medications   Medication Dose Route Frequency Provider Last Rate    atorvastatin  80 mg Oral Daily [facility] Bear, PETRA      Cholecalciferol  5,000 Units Oral Daily [facility], TOYANP      cyanocobalamin  1,000 mcg Oral Daily [facility]      docusate sodium  100 mg Oral BID [facility], TOYANP      gabapentin  300 mg [facility] BID [facility]      heparin (porcine)  5,000 Units Subcutaneous Q8H [facility] PETRA Sifuentes      HYDROmorphone  0.5 mg Intravenous Q4H PRN Dennis Waterman MD      insulin lispro  1-6 Units Subcutaneous HS [facility] Bear, PETRA      insulin lispro  2-12 Units Subcutaneous TID AC [facility] PETRA Sifuentes      lidocaine  1 patch Topical Daily [facility] PETRA Sifuentes      metoprolol succinate  25 mg Oral Daily Jose De Jesus Cardozo PA-C      midodrine  5 mg Oral Before Dialysis [facility] PETRA Sifuentes      nystatin  1 Application Topical BID [facility] PETRA Sifuentes      ondansetron  4 mg Intravenous Q6H PRN [facility] PETRA Sifuentes      oxyCODONE  5 mg Oral Q6H PRN Dennis Waterman MD      polyethylene glycol  17 g Oral Daily PRN [facility] PETRA Sifuentes      senna  1 tablet Oral HS [facility] PETRA Sifuentes      sodium hypochlorite  1 Application Irrigation Daily [place] PETRA Shell       torsemide  40 mg Oral Daily Kylie Kendall MD       Today, Patient Was Seen By: Daniel Verma DO

## 2024-06-19 NOTE — PROGRESS NOTES
"Progress Note - Cardiology   [facility]     Joaquin Frankel 44 y.o. male MRN: [id number]  : 1980  Unit/Bed#: 4 [place] 412-01 Encounter: [id number]    Assessment and Plan:   1. CLOVIS: patient presented with creatinine of six on day of admission 6/3/2024 and peaked at 10.4.    -   Started on CRRT transition to intermittent hemodialysis per nephrology during hospitalization    -   has right IJ primary Placed on 2024    -   managed per nephrology     2. Diabetes: hemoglobin A1c 5.7    -    managed per primary team     3. Hypertension: patient had been on Coreg but was transition to Toprol-XL due to hypotension.    -   Procardia XL 30 mg daily started on 2024 by nephrology, blood pressure greatly improved    -   continue to monitor blood pressure     4. Chronic lymphedema: venous duplex of lower extremities currently pending    -   would benefit from lymphedema pumps and therapy     5. Morbid obesity: BMI 62       Appears to be stable from a cardiac standpoint. We will follow PRN. Please not hesitate contact if needed again during this admission.    Subjective / Objective:       Vitals: Blood pressure 112/62, pulse 62, temperature 97.5 °F (36.4 °C), resp. rate 16, height 5' 7\" (1.702 m), weight (!) 184 kg (406 lb 1.4 oz), SpO2 94%.  Vitals:    24 0550 24 0535   Weight: (!) 185 kg (408 lb) (!) 184 kg (406 lb 1.4 oz)     Body mass index is 63.6 kg/m².  BP Readings from Last 3 Encounters:   24 112/62   24 134/74   24 144/72     Orthostatic Blood Pressures      Flowsheet Row Most Recent Value   Blood Pressure 112/62 filed at 2024 0946   Patient Position - Orthostatic VS Lying filed at 2024 1540          I/O          0701   0700  0701   0700  0701   0700    P.O. 600      I.V. (mL/kg)  500 (2.7)     Total Intake(mL/kg) 600 (3.2) 500 (2.7)     Other  4500     Total Output  4500     Net +600 -4000              "     Invasive Devices       Peripheral Intravenous Line  Duration             Peripheral IV 24 Dorsal (posterior);Right Hand 1 day              Hemodialysis Catheter  Duration             HD Permanent Double Catheter 4 days                      Intake/Output Summary (Last 24 hours) at 2024 1119  Last data filed at 2024 1540  Gross per 24 hour   Intake 500 ml   Output 4500 ml   Net -4000 ml         Physical Exam:   Physical Exam  Constitutional:       Appearance: Normal appearance.   Eyes:      General: No scleral icterus.        Right eye: No discharge.         Left eye: No discharge.   Cardiovascular:      Rate and Rhythm: Normal rate and regular rhythm.   Pulmonary:      Effort: Pulmonary effort is normal.      Breath sounds: Normal breath sounds.   Abdominal:      General: Bowel sounds are normal. There is no distension.      Palpations: Abdomen is soft.   Musculoskeletal:      Right lower le+ Edema present.      Left lower le+ Edema present.      Comments: Component of lymphedema   Skin:     Capillary Refill: Capillary refill takes less than 2 seconds.   Neurological:      General: No focal deficit present.      Mental Status: He is alert and oriented to person, place, and time. Mental status is at baseline.           Medications/ Allergies:     Current Facility-Administered Medications   Medication Dose Route Frequency Provider Last Rate    atorvastatin  80 mg Oral Daily Holly Rachelle Sifuentes, TOYANP      Cholecalciferol  5,000 Units Oral Daily Hollyjacklyn Sifuentes, CRNP      cyanocobalamin  1,000 mcg Oral Daily Hollyjackyln Sifuentes, CRNP      docusate sodium  100 mg Oral BID Holly Bush Bear, CRNP      gabapentin  300 mg Oral BID Holly Bush Bear, CRNP      heparin (porcine)  5,000 Units Subcutaneous Q8H [facility] PETRA Briceño      HYDROmorphone  0.5 mg Intravenous Q4H PRN Dennis Waterman MD      insulin lispro  1-6 Units Subcutaneous HS  PETRA Briceño      insulin lispro  2-12 Units Subcutaneous TID AC PETRA Briceño      lidocaine  1 patch Topical Daily PETRA Briceño      metoprolol succinate  25 mg Oral Daily Jose De Jesus Cardozo PA-C      midodrine  5 mg Oral Before Dialysis PETRA Briceño      nystatin  1 Application Topical BID PETRA Briceño      ondansetron  4 mg Intravenous Q6H PRN PETRA Briceño      oxyCODONE  5 mg Oral Q6H PRN Dennis Waterman MD      polyethylene glycol  17 g Oral Daily PRN PETRA Briceño      senna  1 tablet Oral HS PETRA Briceño      sodium hypochlorite  1 Application Irrigation Daily PETRA Briceño      torsemide  40 mg Oral Daily Kylie Kendall MD       HYDROmorphone, 0.5 mg, Q4H PRN  midodrine, 5 mg, Before Dialysis  ondansetron, 4 mg, Q6H PRN  oxyCODONE, 5 mg, Q6H PRN  polyethylene glycol, 17 g, Daily PRN      Allergies   Allergen Reactions    Keflex [Cephalexin] Facial Swelling and Lip Swelling    Amoxicillin Hives     childhood       VTE Pharmacologic Prophylaxis:   Sequential compression device (Venodyne)     Labs:   CBC with diff:  Results from last 7 days   Lab Units 06/19/24  0301 06/18/24  0603 06/17/24  0450 06/16/24  0510 06/14/24  0751 06/13/24  0640   WBC Thousand/uL 12.82* 12.61* 11.74* 12.57* 11.18* 11.51*   HEMOGLOBIN g/dL 8.0* 7.5* 7.5* 7.7* 7.6* 7.0*   HEMATOCRIT % 25.2* 24.2* 23.6* 24.2* 24.1* 22.8*   MCV fL 88 90 89 90 90 90   PLATELETS Thousands/uL 405* 378 346 316 303 284   RBC Million/uL 2.85* 2.68* 2.66* 2.70* 2.69* 2.53*   MCH pg 28.1 28.0 28.2 28.5 28.3 27.7   MCHC g/dL 31.7 31.0* 31.8 31.8 31.5 30.7*   RDW % 14.3 14.3 14.2 14.2 14.2 14.5   MPV fL 8.4* 8.5* 8.2* 8.1* 8.2* 8.1*     CMP:  Results from last 7 days   Lab Units 06/19/24  0307 06/18/24  0603 06/17/24  0450 06/16/24  0510 06/14/24  0751 06/13/24  0640   SODIUM mmol/L 129* 127* 129*  128* 130* 131*   POTASSIUM mmol/L 4.7 5.0 4.5 4.0 4.1 4.4   CHLORIDE mmol/L 92* 91* 93* 91* 93* 96   CO2 mmol/L 25 23 27 27 27 24   ANION GAP mmol/L 12 13 9 10 10 11   BUN mg/dL 48* 66* 56* 48* 48* 67*   CREATININE mg/dL 9.30* 11.18* 9.48* 8.07* 7.63* 8.94*   CALCIUM mg/dL 8.4 8.3* 8.1* 8.0* 8.0* 8.0*   EGFR ml/min/1.73sq m 6 4 6 7 7 6       Imaging & Testing   I have personally reviewed pertinent reports.     VAS VENOUS DUPLEX - LOWER LIMB BILATERAL    Result Date: 6/17/2024  Narrative:  [facility] REPORT CLINICAL: Indications: Patient presents with worsening bilateral lower extremity edema x a few weeks. Operative History: 2024-06-14 Right Tunneled central line Coronary angioplasty with stent placement Risk Factors The patient has history of Obesity, HTN, Diabetes, Hyperlipidemia, CAD, CKD, and previous smoking (quit 1-5yrs ago).   CONCLUSION:  Impression:  RIGHT LOWER LIMB: No gross evidence of acute or chronic deep vein thrombosis. No gross evidence of superficial thrombophlebitis noted. Doppler evaluation shows a normal response to augmentation maneuvers. Popliteal, posterior tibial and anterior tibial arterial Doppler waveforms are triphasic  LEFT LOWER LIMB: No gross evidence of acute or chronic deep vein thrombosis. No gross evidence of superficial thrombophlebitis noted. Doppler evaluation shows a normal response to augmentation maneuvers. Popliteal, posterior tibial and anterior tibial arterial Doppler waveforms are triphasic/hyperemic.  Tech Note: There is an echogenic structure located in the left inguinal region measuring approximately 3.31cm suggestive of enlarged lymphatic channels.  Technical findings were faxed to chart. Technically difficult/limited study. Some segments may be poorly visualized on today's exam.  SIGNATURE: Electronically Signed by: ANDREA WINSTON on 2024-06-17 08:12:45 PM    IR tunneled central line placement    Result Date: 6/14/2024  Narrative: PROCEDURE:  Nontunneled central venous catheter removal. Tunneled dialysis catheter placement. Procedural Personnel Attending physician(s): Radames Liu MD Pre-procedure diagnosis: Anuric CLOVIS on CKD on HD without evidence of renal recovery Post-procedure diagnosis: Same Clinical History: 44-year-old man with history of acute kidney injury on pre-existing chronic kidney disease, who had a temporary dialysis catheter placed by me on 6/6/2024. On 6/10/2024, a small tear/hole was noted in the catheter I had placed, and that catheter was removed. The critical care service placed a new temporary dialysis catheter on that date. He has had no evidence of renal recovery and requires transition to a tunneled dialysis catheter. PROCEDURE SUMMARY: - Removal of nontunneled central venous catheter - Right internal jugular vein access under real-time ultrasound guidance - Tunneled dialysis catheter placement PROCEDURE DETAILS: Pre-procedure Consent: Informed consent for the procedure including risks, benefits and alternatives was obtained and time-out was performed prior to the procedure. Preparation: The site was prepared and draped using maximal sterile barrier technique including cutaneous antisepsis. Anesthesia/sedation Level of anesthesia/sedation: Moderate sedation (conscious sedation) Anesthesia/sedation administered by: Independent trained observer under attending supervision with continuous monitoring of the patient's level of consciousness and physiologic status Total intra-service sedation time: 1 hour Technique and Findings: Initial fluoroscopic image demonstrated a temporary dialysis catheter in place, with tip terminating in the mid superior vena cava. The access trajectory was not amenable to conversion to a tunneled catheter. The temporary dialysis catheter was removed and hemostasis obtained with gentle manual pressure. Ultrasound demonstrated a widely patent right internal jugular vein, appropriate for access.  Local anesthetic was administered. A dermatotomy was made. Under real-time ultrasound guidance, a 21-gauge micropuncture needle was advanced into the right internal jugular vein from a lateral approach. A permanent image was saved. A microwire was advanced and the needle exchanged for the micropuncture transitional dilator. This access was secured with a flow switch. An appropriate length tunneled dialysis catheter was selected. Local anesthetic was administered along the planned tunnel tract. A dermatotomy was made on the chest. The catheter was tunneled from the chest incision to the right neck incision. Attention was returned to the vascular access. The microwire was exchanged for a 0.035 inch guidewire. The access site was serially dilated and a peel-away sheath was placed. The catheter was inserted via the sheath, which was then peeled away. Completion image demonstrated that the tip terminated in the right atrium. The catheter was checked and both lumens provided brisk flow for dialysis. The catheter was secured with nonabsorbable suture, both lumens flushed and capped per protocol, and a sterile dressing was applied. Contrast None Radiation Dose Fluoroscopy time: 1.1 min Reference air kerma: 20 mGy Additional Details Specimens removed: None Estimated blood loss (mL): Less than 10 Complications: No immediate complications.     Impression: Successful placement of 15.5 [place] by 32 cm right chest/right internal jugular vein tunneled hemodialysis catheter. The tip terminates in the right atrium. The catheter is ready for immediate use. Workstation performed: [id number]     US bedside procedure    Result Date: 6/13/2024  Narrative: [id number]    IR biopsy bone marrow    Result Date: 6/13/2024  Narrative: PROCEDURE: CT-guided bone marrow biopsy and bone marrow aspiration STAFF: Geovani Rodarte M.D. DOSE LENGTH PRODUCT: 1292.30 mGy-cm. This examination, like all CT scans performed in  the [facility], was performed utilizing techniques to minimize radiation dose exposure, including the use of iterative reconstruction and automated exposure control. NUMBER OF IMAGES: Multiple. COMPLICATIONS: None. MEDICATIONS: 1% lidocaine Moderate sedation was monitored by radiology nursing staff.  Monitoring of conscious sedation totaled 0 minutes. INDICATION: Rule out multiple myeloma PROCEDURE: Using intermittent CT guidance, the Sensor Tower system was used to perform bone marrow aspirate via the left posterior superior iliac spine. Approximately 6 mL of marrow was removed and sent to pathology. Next, bone marrow biopsy was performed. The biopsy specimen was placed in formalin and sent to pathology. The needle was then removed and hemostasis was achieved using manual compression.     Impression: Bone marrow aspiration and biopsy. Workstation performed: [id number]     XR chest portable ICU    Result Date: 6/11/2024  Narrative: XR CHEST PORTABLE ICU INDICATION: status post right IJ temporary hemodialysis catheter placement. COMPARISON: 6/8/2024 FINDINGS: Right internal jugular dialysis catheter projects over the SVC. Clear lungs. There is vascular congestion. No pneumothorax or pleural effusion. Enlarged cardiac silhouette, unchanged. Bones are unremarkable for age. Normal upper abdomen.     Impression: 1.  Right internal jugular dialysis catheter projects over the SVC. No pneumothorax. 2.  Cardiomegaly with vascular congestion. Workstation performed: [id number]     CT chest abdomen pelvis wo contrast    Result Date: 6/10/2024  Narrative: CT CHEST, ABDOMEN AND PELVIS WITHOUT IV CONTRAST INDICATION: hypotension, hypervolemic. COMPARISON: 6/3/2024 TECHNIQUE: CT examination of the chest, abdomen and pelvis was performed without intravenous contrast. Multiplanar 2D reformatted images were created from the source data. This examination, like all CT scans performed in the [facility], was performed utilizing techniques to minimize radiation dose exposure, including the use of iterative reconstruction and automated exposure control. Radiation dose length product (DLP) for this visit: 1673 mGy-cm Enteric Contrast: Not administered. FINDINGS: CHEST LUNGS: Lungs are clear. No tracheal or endobronchial lesion. PLEURA: Small left greater than right basilar effusions with compressive atelectasis HEART/GREAT VESSELS: Heart is unremarkable for patient's age. No thoracic aortic aneurysm. MEDIASTINUM AND SLAVA: Unremarkable. CHEST WALL AND LOWER NECK: Unremarkable. ABDOMEN LIVER/BILIARY TREE: Enlarged fatty liver noted. GALLBLADDER: No calcified gallstones. No pericholecystic inflammatory change. SPLEEN: The spleen appears enlarged. PANCREAS: Unremarkable. ADRENAL GLANDS: Unremarkable. KIDNEYS/URETERS: Unremarkable. No hydronephrosis. STOMACH AND BOWEL: Moderate fecal stasis. APPENDIX: Normal. ABDOMINOPELVIC CAVITY: Mildly prominent left greater than right inguinal nodes. Left greater than right iliac chain adenopathy is redemonstrated and stable. Small periaortic and mesenteric root nodes are also identified. VESSELS: Unremarkable for patient's age. PELVIS REPRODUCTIVE ORGANS: Unremarkable for patient's age. URINARY BLADDER: Decompressed with a Robbins catheter in place ABDOMINAL WALL/INGUINAL REGIONS: Right central line tip terminating at the caval atrial junction. BONES: Age-appropriate degenerative change without acute osseous abnormality.     Impression: 1. Small left greater than right basilar effusions with associated compressive atelectasis. Lungs otherwise clear. 2. Periaortic retroperitoneal, left greater than right iliac chain and inguinal lymph nodes identified, stable and nonspecific. 3. No acute findings. Workstation performed: [id number]     XR chest portable    Result Date: 6/9/2024  Narrative: XR CHEST PORTABLE INDICATION: Fever and leukocytosis. COMPARISON: Chest CT 6/9/2024 and  CXR 6/3/2024. FINDINGS: Right jugular catheter in right atrium. Mild pulmonary venous congestion. No pneumothorax or pleural effusion. Mild cardiomegaly. Bones are unremarkable for age. Normal upper abdomen.     Impression: Mild pulmonary venous congestion. Nothing to suggest pneumonia. Workstation performed: [id number]     CT lower extremity wo contrast left    Result Date: 6/9/2024  Narrative: CT left lower extremity without IV contrast INDICATION: severe pain. COMPARISON: None. TECHNIQUE: CT examination of the left lower extremity from the hip to the foot was performed. This examination, like all CT scans performed in the [facility], was performed utilizing techniques to minimize radiation dose exposure, including the use of iterative reconstruction and automated exposure control software.  Multiplanar 2D reformatted images were created from the source data. Rad dose  1394.86 mGy-cm FINDINGS: OSSEOUS STRUCTURES:  No fracture, dislocation or destructive osseous lesion. No osseous erosion. There is mild to moderate left hip osteoarthrosis. There is moderate to severe tricompartmental osteoarthrosis of the knee. VISUALIZED MUSCULATURE:  Unremarkable. SOFT TISSUES: There is prominent subcutaneous edema and skin thickening in the thigh greater medially. There is severe circumferential subcutaneous edema and skin thickening throughout the lower leg extending into the dorsum of the foot. This may either reflect bland edema or extensive cellulitis. A well-defined rim-enhancing fluid collection not seen to suggest a discrete abscess. No soft tissue gas. OTHER PERTINENT FINDINGS:  None.     Impression: Extensive subcutaneous edema and skin thickening as described above. This may either reflect bland edema or extensive cellulitis. A well-defined rim-enhancing fluid collection not seen to suggest a discrete abscess. No soft tissue gas. Osteoarthrosis as above. No osseous erosion. Workstation performed:  [id number]      temporary dialysis catheter placement    Result Date: 6/7/2024  Narrative: PROCEDURE: Temporary dialysis catheter placement Procedural Personnel Attending physician(s): Radames Liu MD Pre-procedure diagnosis: CLOVIS on CKD Post-procedure diagnosis: Same Clinical History: 44-year-old man with history of acute kidney injury on pre-existing chronic kidney disease, now with uremic symptoms and need to initiate hemodialysis. PROCEDURE SUMMARY: Ultrasound and fluoroscopy guided temporary dialysis catheter placement PROCEDURE DETAILS: Pre-procedure Consent: Informed consent for the procedure including risks, benefits and alternatives was obtained and time-out was performed prior to the procedure. Preparation: The site was prepared and draped using maximal sterile barrier technique including cutaneous antisepsis. Anesthesia/sedation Level of anesthesia/sedation: No sedation Technique and Findings: Immediate preprocedure ultrasound demonstrated a large, dilated, and mildly difficult to compress right internal jugular vein, consistent with patient's fluid overload. Local anesthetic was administered. A dermatotomy was made. Access was obtained using a 21-gauge micropuncture needle under real-time ultrasound guidance, taking a course amenable for future conversion to tunneled dialysis catheter. A microwire was advanced to the right atrium and the needle exchanged for a micropuncture transitional dilator. The intravascular distance was measured with the microwire. A 24 cm temporary dialysis catheter was selected. An Amplatz wire was advanced to the inferior vena cava. The access tract was serially dilated and the temporary dialysis catheter was advanced over the wire into the right atrium. There was significant oozing/bleeding around the catheter, consistent with impaired hemostasis in the setting of uremia. This was refractory to application of pressure. Hemostasis was obtained with a 3-0 Vicryl  pursestring suture around the catheter tract. The catheter was secured with nonabsorbable suture. Completion image demonstrated appropriate positioning of the newly placed catheter within the right atrium. The catheter was checked and both lumens provided brisk flow. Contrast Contrast dose: None Radiation Dose Fluoroscopy time: 1.5 MIN Reference air kerma: 86 mGy Additional Details Specimens removed: None Estimated blood loss (mL): 200 Complications: No immediate complications.     Impression: 1. Uncomplicated nontunneled/temporary dialysis catheter placement (14 [place] of [place] by 24 cm). Catheter tip terminates in the right atrium. The catheter is ready for immediate use. 2. Impaired hemostasis was discussed with Dr. Timmons of Nephrology. DDAVP will be administered to help mitigate rebleeding risk. Workstation performed: [id number]     Echo complete w/ contrast if indicated    Result Date: 6/4/2024  Narrative:   Left Ventricle: Left ventricular cavity size is normal. Wall thickness is moderately increased. There is moderate concentric hypertrophy. The left ventricular ejection fraction is 58% by visual estimation. Systolic function is normal. Wall motion is normal. Diastolic function is mildly abnormal, consistent with grade I (abnormal) relaxation.  Left atrial filling pressure is normal.   Left Atrium: The atrium is mildly dilated.   Right Atrium: The atrium is mildly dilated.   Mitral Valve: There is mild regurgitation.   Tricuspid Valve: There is mild regurgitation. The right ventricular systolic pressure is moderately elevated. The estimated right ventricular systolic pressure is 58.00 mmHg.   Pulmonic Valve: There is mild regurgitation.   Compared to previous exam, there is no significant change.     CT chest abdomen pelvis wo contrast    Result Date: 6/3/2024  Narrative: CT CHEST, ABDOMEN AND PELVIS WITHOUT IV CONTRAST INDICATION: Dyspnea and renal failure. COMPARISON: CT of the chest from 2/17/2024, CT of the  abdomen and pelvis from 5/4/2022, and CT of the chest, abdomen and pelvis from 12/1/2022. TECHNIQUE: CT examination of the chest, abdomen and pelvis was performed without intravenous contrast due to elevated creatinine. Multiplanar 2D reformatted images were created from the source data. This examination, like all CT scans performed in the [facility], was performed utilizing techniques to minimize radiation dose exposure, including the use of iterative reconstruction and automated exposure control. Radiation dose length product (DLP) for this visit:  2830 mGy-cm Enteric Contrast: Enteric contrast was not administered. FINDINGS: Evaluation of visceral structures and vasculature is limited by lack of intravenous contrast. CHEST BRONCHOPULMONARY:  Clear central airways. There is slight mosaic attenuation. There are areas of atelectasis/or scarring in the lower lobes, similar to February 2024. PLEURA: Small left pleural effusion. No pneumothorax. HEART/GREAT VESSELS: The heart is normal in size. No pericardial effusion. Normal caliber thoracic aorta. MEDIASTINUM/LYMPH NODES:  No axillary or mediastinal lymphadenopathy by size criteria. Nonspecific subcentimeter mediastinal lymph nodes are seen. Evaluation for hilar lymphadenopathy is limited without IV contrast. CHEST WALL AND LOWER NECK:  Unremarkable. ABDOMEN LIVER/BILIARY TREE:  Unremarkable unenhanced appearance of the liver. No biliary ductal dilation. GALLBLADDER:  No calcified gallstones. No pericholecystic inflammatory change. SPLEEN: Unremarkable unenhanced appearance. PANCREAS:  Unremarkable unenhanced appearance. No dilation of the main pancreatic duct. ADRENAL GLANDS:  Unremarkable unenhanced appearance. KIDNEYS/URETERS:  No intrarenal or ureteral calculi. No hydronephrosis. No definite focal renal lesions. STOMACH AND BOWEL:  Evaluation of the gastrointestinal tract is somewhat limited by underdistention and lack of oral contrast. No bowel  obstruction or convincing inflammation. APPENDIX: Normal appendix. ABDOMINOPELVIC CAVITY:  No ascites or pneumoperitoneum. LYMPH NODES: There are several enlarged left external iliac lymph nodes measuring up to 2.1 cm in short axis. There is also an enlarged left inguinal lymph node measuring 1.5 cm in short axis and several additional smaller left inguinal lymph nodes. These are increased compared to the May 2022 CT. No other abdominal or pelvic lymphadenopathy by size criteria. Bilateral right external iliac lymph nodes are prominent, however below size threshold for lymphadenopathy. VESSELS: Normal caliber aorta. PELVIS REPRODUCTIVE ORGANS:  The prostate gland is not enlarged. URINARY BLADDER:  Unremarkable. ABDOMINAL WALL/INGUINAL REGIONS: No ventral abdominal wall hernia. In large portion of the subcutaneous tissues of the anterior abdominal wall are outside of the field-of-view. There is subcutaneous tissue stranding and skin thickening of the pannus as on the prior studies. There is a partially imaged tubular mildly hyperdense lesion along the left side of the undersurface of the pannus in the skin or immediately deep to the skin, which has been present dating back to at least 2021. MUSCULOSKELETAL:  No focal aggressive osseous lesions. Degenerative changes of the spine. There is a healing, subacute fracture of the right 7th rib anteriorly.     Impression: No acute pulmonary pathology. No acute abdominal or pelvic pathology within limitation of a noncontrast study. In particular, no hydronephrosis. Left inguinal and left external iliac lymphadenopathy, increased from May 2022. This may be reactive to a process in the left lower extremity or in the partially imaged pannus. Additional findings as above. Workstation performed: [id number]     NM lung ventilation / perfusion    Result Date: 6/3/2024  Narrative: VENTILATION AND PERFUSION SCAN INDICATION: Shortness of breath, hypoxia, increased D-dimer, left lower  "extremity swelling COMPARISON:  Chest radiograph 6/3/2024 TECHNIQUE:  Posterior ventilation imaging was performed after the inhalation of 32 mCi nebulized Tc-99m DTPA with deposition of less than 1 mCi of activity within the lungs.  Multiplanar perfusion imaging was next performed following the intravenous administration of 4.2 mCi Tc-99m labeled MAA. FINDINGS: Ventilation imaging demonstrates a minimally heterogeneous distribution of radiopharmaceutical throughout both lungs. There is central deposition of the radiotracer. Perfusion imaging demonstrates a mildly heterogeneous distribution of the radiopharmaceutical throughout both lungs. There is mildly decreased perfusion activity in the left lung as compared to the right, but matching the ventilation pattern. There is no  significant  segmental or subsegmental defect.     Impression: The probability for pulmonary embolus is low. Workstation performed: [id number]     XR chest 1 view portable    Result Date: 6/3/2024  Narrative: XR CHEST PORTABLE INDICATION: chest pain, shortness of breath. COMPARISON: Chest radiograph February 15, 2024 FINDINGS: Clear lungs. No pneumothorax or pleural effusion. Normal cardiomediastinal silhouette. Bones are unremarkable for age. Normal upper abdomen.     Impression: No acute cardiopulmonary disease. Workstation performed: [id number]         Nathalie LAMBERT  Cardiology    \"This note was completed in part utilizing [employer] direct voice recognition software.   Grammatical errors, random word insertion, spelling mistakes, and incomplete sentences may be an occasional consequence of the system secondary to software limitations, ambient noise and hardware issues.    Please read the chart carefully and recognize, using context, where substitutions have occurred.  If you have any questions or concerns about the context, text or information contained within the body of this dictation, please contact myself, the provider, for " further clarification.

## 2024-06-19 NOTE — ASSESSMENT & PLAN NOTE
Lab Results   Component Value Date    HGBA1C 5.7 (H) 06/09/2024     Recent Labs     06/23/24  1938 06/24/24  0738 06/24/24  1141 06/24/24  1615   POCGLU 128 87 103 92     Blood Sugar Average: Last 72 hrs:  (P) [id number]    Amaryl held during his hospitalization.   Acceptable, continue to monitor on current regimen  Continue diabetic diet.

## 2024-06-19 NOTE — ASSESSMENT & PLAN NOTE
Patient with a history of CAD with multivessel disease s/p stents.    PCI to the left circumflex in 2010, NSTEMI in November 2020 and PCI the RCA 12/1/2020   Held aspirin initially for biopsy, subsequently resumed.    Continue on Toprol-XL  Continue statin  D/W cardiology recs:  continue Lipitor 80 mg once a day  continue Toprol-XL 25 mg daily  Imdur increased to 60 mg daily.  Nuclear Stress test 6/24/24: fixed apical defect. SSS 3 SRS 1 SDS 2. No major reversible perfusion defect noted on this study.   D/w Cardiology and no Cardiac intervention and they d/w patient as well and recommended medical management as above.

## 2024-06-20 ENCOUNTER — APPOINTMENT (INPATIENT)
Dept: DIALYSIS | Facility: HOSPITAL | Age: 44
DRG: 673 | End: 2024-06-20
Attending: INTERNAL MEDICINE
Payer: COMMERCIAL

## 2024-06-20 LAB
ANION GAP SERPL CALCULATED.3IONS-SCNC: 13 MMOL/L (ref 4–13)
BASOPHILS # BLD AUTO: 0.03 THOUSANDS/ÂΜL (ref 0–0.1)
BASOPHILS NFR BLD AUTO: 0 % (ref 0–1)
BUN SERPL-MCNC: 57 MG/DL (ref 5–25)
CALCIUM SERPL-MCNC: 8.5 MG/DL (ref 8.4–10.2)
CHLORIDE SERPL-SCNC: 91 MMOL/L (ref 96–108)
CO2 SERPL-SCNC: 24 MMOL/L (ref 21–32)
CREAT SERPL-MCNC: 11.36 MG/DL (ref 0.6–1.3)
EOSINOPHIL # BLD AUTO: 0.49 THOUSAND/ÂΜL (ref 0–0.61)
EOSINOPHIL NFR BLD AUTO: 5 % (ref 0–6)
ERYTHROCYTE [DISTWIDTH] IN BLOOD BY AUTOMATED COUNT: 14.3 % (ref 11.6–15.1)
GFR SERPL CREATININE-BSD FRML MDRD: 4 ML/MIN/1.73SQ M
GLUCOSE SERPL-MCNC: 102 MG/DL (ref 65–140)
GLUCOSE SERPL-MCNC: 109 MG/DL (ref 65–140)
GLUCOSE SERPL-MCNC: 164 MG/DL (ref 65–140)
GLUCOSE SERPL-MCNC: 87 MG/DL (ref 65–140)
GLUCOSE SERPL-MCNC: 88 MG/DL (ref 65–140)
HCT VFR BLD AUTO: 22.7 % (ref 36.5–49.3)
HGB BLD-MCNC: 7.1 G/DL (ref 12–17)
IMM GRANULOCYTES # BLD AUTO: 0.11 THOUSAND/UL (ref 0–0.2)
IMM GRANULOCYTES NFR BLD AUTO: 1 % (ref 0–2)
LYMPHOCYTES # BLD AUTO: 1.21 THOUSANDS/ÂΜL (ref 0.6–4.47)
LYMPHOCYTES NFR BLD AUTO: 13 % (ref 14–44)
MCH RBC QN AUTO: 28.5 PG (ref 26.8–34.3)
MCHC RBC AUTO-ENTMCNC: 31.3 G/DL (ref 31.4–37.4)
MCV RBC AUTO: 91 FL (ref 82–98)
MONOCYTES # BLD AUTO: 0.72 THOUSAND/ÂΜL (ref 0.17–1.22)
MONOCYTES NFR BLD AUTO: 8 % (ref 4–12)
NEUTROPHILS # BLD AUTO: 7.09 THOUSANDS/ÂΜL (ref 1.85–7.62)
NEUTS SEG NFR BLD AUTO: 73 % (ref 43–75)
NRBC BLD AUTO-RTO: 0 /100 WBCS
PLATELET # BLD AUTO: 377 THOUSANDS/UL (ref 149–390)
PMV BLD AUTO: 8.7 FL (ref 8.9–12.7)
POTASSIUM SERPL-SCNC: 5 MMOL/L (ref 3.5–5.3)
RBC # BLD AUTO: 2.49 MILLION/UL (ref 3.88–5.62)
SODIUM SERPL-SCNC: 128 MMOL/L (ref 135–147)
WBC # BLD AUTO: 9.65 THOUSAND/UL (ref 4.31–10.16)

## 2024-06-20 PROCEDURE — 99233 SBSQ HOSP IP/OBS HIGH 50: CPT | Performed by: STUDENT IN AN ORGANIZED HEALTH CARE EDUCATION/TRAINING PROGRAM

## 2024-06-20 PROCEDURE — 82948 REAGENT STRIP/BLOOD GLUCOSE: CPT

## 2024-06-20 PROCEDURE — 99232 SBSQ HOSP IP/OBS MODERATE 35: CPT | Performed by: INTERNAL MEDICINE

## 2024-06-20 PROCEDURE — 80048 BASIC METABOLIC PNL TOTAL CA: CPT | Performed by: STUDENT IN AN ORGANIZED HEALTH CARE EDUCATION/TRAINING PROGRAM

## 2024-06-20 PROCEDURE — 85025 COMPLETE CBC W/AUTO DIFF WBC: CPT | Performed by: STUDENT IN AN ORGANIZED HEALTH CARE EDUCATION/TRAINING PROGRAM

## 2024-06-20 RX ADMIN — HYDROMORPHONE HYDROCHLORIDE 0.5 MG: 1 INJECTION, SOLUTION INTRAMUSCULAR; INTRAVENOUS; SUBCUTANEOUS at 23:47

## 2024-06-20 RX ADMIN — HEPARIN SODIUM 5000 UNITS: 5000 INJECTION, SOLUTION INTRAVENOUS; SUBCUTANEOUS at 13:14

## 2024-06-20 RX ADMIN — METOPROLOL SUCCINATE 25 MG: 25 TABLET, EXTENDED RELEASE ORAL at 08:00

## 2024-06-20 RX ADMIN — OXYCODONE HYDROCHLORIDE 5 MG: 5 TABLET ORAL at 22:47

## 2024-06-20 RX ADMIN — TORSEMIDE 40 MG: 20 TABLET ORAL at 08:00

## 2024-06-20 RX ADMIN — NYSTATIN 1 APPLICATION: 100000 POWDER TOPICAL at 08:05

## 2024-06-20 RX ADMIN — HYDROMORPHONE HYDROCHLORIDE 0.5 MG: 1 INJECTION, SOLUTION INTRAMUSCULAR; INTRAVENOUS; SUBCUTANEOUS at 03:28

## 2024-06-20 RX ADMIN — OXYCODONE HYDROCHLORIDE 5 MG: 5 TABLET ORAL at 00:41

## 2024-06-20 RX ADMIN — HEPARIN SODIUM 5000 UNITS: 5000 INJECTION, SOLUTION INTRAVENOUS; SUBCUTANEOUS at 21:13

## 2024-06-20 RX ADMIN — HYDROMORPHONE HYDROCHLORIDE 0.5 MG: 1 INJECTION, SOLUTION INTRAMUSCULAR; INTRAVENOUS; SUBCUTANEOUS at 17:28

## 2024-06-20 RX ADMIN — CYANOCOBALAMIN TAB 500 MCG 1000 MCG: 500 TAB at 08:00

## 2024-06-20 RX ADMIN — NYSTATIN 1 APPLICATION: 100000 POWDER TOPICAL at 17:27

## 2024-06-20 RX ADMIN — HYOSCYAMINE SULFATE 1 APPLICATION: 16 SOLUTION at 08:05

## 2024-06-20 RX ADMIN — Medication 5000 UNITS: at 08:00

## 2024-06-20 RX ADMIN — HEPARIN SODIUM 5000 UNITS: 5000 INJECTION, SOLUTION INTRAVENOUS; SUBCUTANEOUS at 05:40

## 2024-06-20 RX ADMIN — OXYCODONE HYDROCHLORIDE 5 MG: 5 TABLET ORAL at 16:18

## 2024-06-20 RX ADMIN — HYDROMORPHONE HYDROCHLORIDE 0.5 MG: 1 INJECTION, SOLUTION INTRAMUSCULAR; INTRAVENOUS; SUBCUTANEOUS at 09:51

## 2024-06-20 RX ADMIN — INSULIN LISPRO 2 UNITS: 100 INJECTION, SOLUTION INTRAVENOUS; SUBCUTANEOUS at 16:18

## 2024-06-20 RX ADMIN — ATORVASTATIN CALCIUM 80 MG: 80 TABLET, FILM COATED ORAL at 08:00

## 2024-06-20 RX ADMIN — OXYCODONE HYDROCHLORIDE 5 MG: 5 TABLET ORAL at 07:59

## 2024-06-20 NOTE — PLAN OF CARE
Problem: Prexisting or High Potential for Compromised Skin Integrity  Goal: Skin integrity is maintained or improved  Description: INTERVENTIONS:  - Identify patients at risk for skin breakdown  - Assess and monitor skin integrity  - Assess and monitor nutrition and hydration status  - Monitor labs   - Assess for incontinence   - Turn and reposition patient  - Assist with mobility/ambulation  - Relieve pressure over bony prominences  - Avoid friction and shearing  - Provide appropriate hygiene as needed including keeping skin clean and dry  - Evaluate need for skin moisturizer/barrier cream  - Collaborate with interdisciplinary team   - Patient/family teaching  - Consider wound care consult   Outcome: Progressing     Problem: PAIN - ADULT  Goal: Verbalizes/displays adequate comfort level or baseline comfort level  Description: Interventions:  - Encourage patient to monitor pain and request assistance  - Assess pain using appropriate pain scale  - Administer analgesics based on type and severity of pain and evaluate response  - Implement non-pharmacological measures as appropriate and evaluate response  - Consider cultural and social influences on pain and pain management  - Notify physician/advanced practitioner if interventions unsuccessful or patient reports new pain  Outcome: Progressing     Problem: INFECTION - ADULT  Goal: Absence or prevention of progression during hospitalization  Description: INTERVENTIONS:  - Assess and monitor for signs and symptoms of infection  - Monitor lab/diagnostic results  - Monitor all insertion sites, i.e. indwelling lines, tubes, and drains  - [place] appropriate cooling/warming therapies per order  - Administer medications as ordered  - Instruct and encourage patient and family to use good hand hygiene technique  - Identify and instruct in appropriate isolation precautions for identified infection/condition  Outcome: Progressing     Problem: SAFETY ADULT  Goal: Patient will  remain free of falls  Description: INTERVENTIONS:  - Educate patient/family on patient safety including physical limitations  - Instruct patient to call for assistance with activity   - Consult OT/PT to assist with strengthening/mobility   - Keep Call bell within reach  - Keep bed low and locked with side rails adjusted as appropriate  - Keep care items and personal belongings within reach  - Initiate and maintain comfort rounds  - Make Fall Risk Sign visible to staff  - Apply yellow socks and bracelet for high fall risk patients  - Consider moving patient to room near nurses station  Outcome: Progressing  Goal: Maintain or return to baseline ADL function  Description: INTERVENTIONS:  -  Assess patient's ability to carry out ADLs; assess patient's baseline for ADL function and identify physical deficits which impact ability to perform ADLs (bathing, care of mouth/teeth, toileting, grooming, dressing, etc.)  - Assess/evaluate cause of self-care deficits   - Assess range of motion  - Assess patient's mobility; develop plan if impaired  - Assess patient's need for assistive devices and provide as appropriate  - Encourage maximum independence but intervene and supervise when necessary  - Involve family in performance of ADLs  - Assess for home care needs following discharge   - Consider OT consult to assist with ADL evaluation and planning for discharge  - Provide patient education as appropriate  Outcome: Progressing  Goal: Maintains/Returns to pre admission functional level  Description: INTERVENTIONS:  - Perform AM-PAC 6 Click Basic Mobility/ Daily Activity assessment daily.  - Set and communicate daily mobility goal to care team and patient/family/caregiver.   - Collaborate with rehabilitation services on mobility goals if consulted  - Out of bed for toileting  - Record patient progress and toleration of activity level   Outcome: Progressing     Problem: CARDIOVASCULAR - ADULT  Goal: Maintains optimal cardiac output  and hemodynamic stability  Description: INTERVENTIONS:  - Monitor I/O, vital signs and rhythm  - Monitor for S/S and trends of decreased cardiac output  - Administer and titrate ordered vasoactive medications to optimize hemodynamic stability  - Assess quality of pulses, skin color and temperature  - Assess for signs of decreased coronary artery perfusion  - Instruct patient to report change in severity of symptoms  Outcome: Progressing  Goal: Absence of cardiac dysrhythmias or at baseline rhythm  Description: INTERVENTIONS:  - Continuous cardiac monitoring, vital signs, obtain 12 lead EKG if ordered  - Administer antiarrhythmic and heart rate control medications as ordered  - Monitor electrolytes and administer replacement therapy as ordered  Outcome: Progressing     Problem: RESPIRATORY - ADULT  Goal: Achieves optimal ventilation and oxygenation  Description: INTERVENTIONS:  - Assess for changes in respiratory status  - Assess for changes in mentation and behavior  - Position to facilitate oxygenation and minimize respiratory effort  - Oxygen administered by appropriate delivery if ordered  - Initiate smoking cessation education as indicated  - Encourage broncho-pulmonary hygiene including cough, deep breathe, Incentive Spirometry  - Assess the need for suctioning and aspirate as needed  - Assess and instruct to report SOB or any respiratory difficulty  - Respiratory Therapy support as indicated  Outcome: Progressing     Problem: SKIN/TISSUE INTEGRITY - ADULT  Goal: Incision(s), wounds(s) or drain site(s) healing without S/S of infection  Description: INTERVENTIONS  - Assess and document dressing, incision, wound bed, drain sites and surrounding tissue  - Provide patient and family education  - Perform skin care/dressing changes  Outcome: Progressing  Problem: NEUROSENSORY - ADULT  Goal: Achieves maximal functionality and self care  Description: INTERVENTIONS  - Monitor swallowing and airway patency with patient  fatigue and changes in neurological status  - Encourage and assist patient to increase activity and self care.   - Encourage visually impaired, hearing impaired and aphasic patients to use assistive/communication devices  Outcome: Progressing     Problem: METABOLIC, FLUID AND ELECTROLYTES - ADULT  Goal: Electrolytes maintained within normal limits  Description: INTERVENTIONS:  - Monitor labs and assess patient for signs and symptoms of electrolyte imbalances  - Administer electrolyte replacement as ordered  - Monitor response to electrolyte replacements, including repeat lab results as appropriate  - Instruct patient on fluid and nutrition as appropriate  Outcome: Progressing  Goal: Fluid balance maintained  Description: INTERVENTIONS:  - Monitor labs   - Monitor I/O and WT  - Instruct patient on fluid and nutrition as appropriate  - Assess for signs & symptoms of volume excess or deficit  Outcome: Progressing  Goal: Glucose maintained within target range  Description: INTERVENTIONS:  - Monitor Blood Glucose as ordered  - Assess for signs and symptoms of hyperglycemia and hypoglycemia  - Administer ordered medications to maintain glucose within target range  - Assess nutritional intake and initiate nutrition service referral as needed  Outcome: Progressing     Problem: MUSCULOSKELETAL - ADULT  Goal: Maintain or return mobility to safest level of function  Description: INTERVENTIONS:  - Assess patient's ability to carry out ADLs; assess patient's baseline for ADL function and identify physical deficits which impact ability to perform ADLs (bathing, care of mouth/teeth, toileting, grooming, dressing, etc.)  - Assess/evaluate cause of self-care deficits   - Assess range of motion  - Assess patient's mobility  - Assess patient's need for assistive devices and provide as appropriate  - Encourage maximum independence but intervene and supervise when necessary  - Involve family in performance of ADLs  - Assess for home  care needs following discharge   - Consider OT consult to assist with ADL evaluation and planning for discharge  - Provide patient education as appropriate  Outcome: Progressing  Goal: Maintain proper alignment of affected body part  Description: INTERVENTIONS:  - Support, maintain and protect limb and body alignment  - Provide patient/ family with appropriate education  Outcome: Progressing

## 2024-06-20 NOTE — PLAN OF CARE
Patient presents for a 3.5 hour HD session on a 2K2.5Ca bath for a serum potassium of 4.0 mmol/L drawn today with a net UF goal of 3-4 L as tolerated.    Post-Dialysis RN Treatment Note    Blood Pressure:  Pre 136/43 mm/Hg  Post 125/54 mmHg   EDW  TBD kg    Weight:  Pre 185.5 kg   Post 182.0 kg   Mode of weight measurement: Bed Scale   Volume Removed  3500 ml    Treatment duration 210 minutes    NS given  No    Treatment shortened? No   Medications given during Rx None Reported   Estimated Kt/V  Not Applicable   Access type: Permacath/TDC   Access Issues: Yes, describe: Lines reversed     Report called to primary nurse   Yes, Anna Pascal RN     Problem: METABOLIC, FLUID AND ELECTROLYTES - ADULT  Goal: Electrolytes maintained within normal limits  Description: INTERVENTIONS:  - Monitor labs and assess patient for signs and symptoms of electrolyte imbalances  - Administer electrolyte replacement as ordered  - Monitor response to electrolyte replacements, including repeat lab results as appropriate  - Instruct patient on fluid and nutrition as appropriate  Outcome: Progressing  Goal: Fluid balance maintained  Description: INTERVENTIONS:  - Monitor labs   - Monitor I/O and WT  - Instruct patient on fluid and nutrition as appropriate  - Assess for signs & symptoms of volume excess or deficit  Outcome: Progressing

## 2024-06-20 NOTE — PROGRESS NOTES
NEPHROLOGY PROGRESS NOTE   Joaquin Frankel 44 y.o. male MRN: [id number]  Unit/Bed#: [address] [place] [address] Encounter: [id number]  Reason for Consult: ARF      SUMMARY:    45 yo man with PMH of morbid obesity, lower extremity lymphedema, diabetes, hypertension, CAD p/w shortness of breath for the last 2 weeks.  Nephrology is consulted for management of CLOVIS        ASSESSMENT and PLAN:     Acute renal failure, currently dialysis dependent  -- Present on admission with suspected underlying chronic kidney disease as prior baseline creatinine was 1.3 to 1.6 mg/dL with previous episodes of acute kidney injury.  Admission creatinine 6 mg/dL  -- Peak creatinine was 10.4 mg/dL prior to dialysis.  -- Initially started on CRRT then transition to intermittent hemodialysis.  Renal placement initiation on June 7th  -- Serologies: ANCA titer was negative, anti-GBM was negative.  JOSE ROBERTO/anti-double-stranded DNA was negative.  Kappa/lambda ratio was within normal limits.  Serum and urine immunofixation showed IgG kappa monoclonal bands.  Complement C3 and C4 were normal.  Beta-2 microglobulin was elevated.  -- Renal imaging via CAT scan showed no evidence of hydronephrosis  -- Access: New right IJ permacath was placed on Friday by interventional radiology.  Temporary HD catheter was removed  -- Status post bone marrow biopsy on June 13.  Awaiting results.  If evidence of multiple myeloma we will then proceed with management plan as per hematology.  If bone marrow biopsy is negative will consider a renal biopsy.  Suspect MGRS  -- Currently dialyzing on a TTS schedule.   -- Continuing to monitor for renal recovery.  At this time no evidence of recovery  -- Outpatient HD placement as per case management overall stable from the standpoint for discharge when deemed stable for the medical team and placement has been set up  --Started on torsemide yesterday no significant improvement of the urine output  --Plan for hemodialysis today     Chronic  kidney disease stage III  -- Prior baseline creatinine 1.3 to 1.6 mg/dL  -- Presumably secondary to diabetic kidney disease, hypertensive nephrosclerosis, obesity related renal dysfunction  -- Suspect she will be at a new baseline creatinine if he does recover     Anemia of chronic kidney disease  -- Follow-up bone marrow biopsy  -- SPEP and UPEP positive for IgG kappa monoclonal bands  -- Hematology on board  -- Received IV iron     Hypertension  --Volume mediated, obese with a BMI greater than 60  -- Blood pressures currently controlled volume mediated  --Continue ultrafiltration with dialysis  --Discontinue nifedipine yesterday to prevent hypotension as we started torsemide  --Will maintain torsemide at this time if there is no meaningful urine output in the next day or so we can discontinue the diuretic     Mineral bone disorder-chronic kidney disease  -- Secondary hyperparathyroidism of renal origin.  -- Vitamin D deficiency  -- Hyperphosphatemia  --Currently on vitamin D supplementation  -- Continue current management     Hyponatremia  --Sodium 128 this morning  --Volume mediated  --Continue fluid restriction and ultrafiltration on dialysis  -- Started torsemide 40 mg daily on June 19 no significant increase in urine output noted     Systemic inflammatory response syndrome  -- CT of the left lower extremity showed extensive subcutaneous edema and skin thickening  -- Blood cultures are negative  --Completed course of cefazolin  -- Draining abscess under pannus on June 13 grew out beta-hemolytic Streptococcus group C     Chronic lymphedema     Morbid obesity  -- BMI 64.2 will fluctuate based on volume status      SUBJECTIVE / INTERVAL HISTORY:    No chest pain or shortness of breath    OBJECTIVE:  Current Weight: Weight - Scale: (!) 186 kg (409 lb 14.4 oz)  Vitals:    06/19/24 2040 06/19/24 2240 06/20/24 0600 06/20/24 0713   BP:  122/51  136/65   Pulse:  56  58   Resp:  19 16   Temp:  98.2 °F (36.8 °C)  97.5 °F  (36.4 °C)   TempSrc:  Oral     SpO2: 92% 92%  98%   Weight:   (!) 186 kg (409 lb 14.4 oz)    Height:         No intake or output data in the 24 hours ending 06/20/24 0910    Review of Systems:    Constitutional: Negative for chills and fever.   HENT: Negative for ear pain and sore throat.    Eyes: Negative for pain and visual disturbance.   Respiratory: Negative for cough and shortness of breath.    Cardiovascular: Negative for chest pain and palpitations.   Gastrointestinal: Negative for abdominal pain and vomiting.   Genitourinary: Negative for dysuria and hematuria.   Musculoskeletal: Negative for arthralgias and back pain.   Skin: Negative for color change and rash.   Neurological: Negative for seizures and syncope.   12 point ROS has been reviewed.    Physical Exam  Vitals and nursing note reviewed.   Constitutional:       General: He is not in acute distress.     Appearance: He is well-developed. He is obese.   HENT:      Head: Normocephalic and atraumatic.   Eyes:      General: No scleral icterus.     Conjunctiva/sclera: Conjunctivae normal.      Pupils: Pupils are equal, round, and reactive to light.   Cardiovascular:      Rate and Rhythm: Normal rate and regular rhythm.      Heart sounds: S1 normal and S2 normal. No murmur heard.     No friction rub. No gallop.   Pulmonary:      Effort: Pulmonary effort is normal. No respiratory distress.      Breath sounds: Normal breath sounds. No wheezing or rales.   Abdominal:      General: Bowel sounds are normal. There is distension.      Palpations: Abdomen is soft.      Tenderness: There is no abdominal tenderness. There is no rebound.   Musculoskeletal:         General: Swelling present. Normal range of motion.      Cervical back: Normal range of motion and neck supple.      Right lower leg: Edema present.      Left lower leg: Edema present.   Skin:     Findings: No rash.   Neurological:      Mental Status: He is alert and oriented to person, place, and time.    Psychiatric:         Behavior: Behavior normal.         Medications:    Current Facility-Administered Medications:     atorvastatin (LIPITOR) tablet 80 mg, 80 mg, Oral, Daily, EPTRA Briceño, 80 mg at 06/20/24 0800    Cholecalciferol (VITAMIN D3) tablet 5,000 Units, 5,000 Units, Oral, Daily, PETRA Briceño, 5,000 Units at 06/20/24 0800    cyanocobalamin (VITAMIN B-12) tablet 1,000 mcg, 1,000 mcg, Oral, Daily, PETRA Briceño, 1,000 mcg at 06/20/24 0800    docusate sodium (COLACE) capsule 100 mg, 100 mg, Oral, BID, PETRA Briceño, 100 mg at 06/17/24 0857    gabapentin (NEURONTIN) capsule 300 mg, 300 mg, Oral, BID, Holly Sifuentes, PETRA, 300 mg at 06/17/24 0857    heparin (porcine) subcutaneous injection 5,000 Units, 5,000 Units, Subcutaneous, Q8H SHAZIA, PETRA Briceño, 5,000 Units at 06/20/24 0540    HYDROmorphone (DILAUDID) injection 0.5 mg, 0.5 mg, Intravenous, Q4H PRN, Dennis Waterman MD, 0.5 mg at 06/20/24 0328    insulin lispro (HumALOG/ADMELOG) 100 units/mL subcutaneous injection 1-6 Units, 1-6 Units, Subcutaneous, HS, PETRA Briceño, 1 Units at 06/06/24 2150    insulin lispro (HumALOG/ADMELOG) 100 units/mL subcutaneous injection 2-12 Units, 2-12 Units, Subcutaneous, TID AC, 2 Units at 06/08/24 1720 **AND** Fingerstick Glucose (POCT), , , TID AC, PETRA Briceño    lidocaine (LIDODERM) 5 % patch 1 patch, 1 patch, Topical, Daily, PETRA Briceño, 1 patch at 06/17/24 0857    metoprolol succinate (TOPROL-XL) 24 hr tablet 25 mg, 25 mg, Oral, Daily, Jose De Jesus Cardozo PA-C, 25 mg at 06/20/24 0800    midodrine (PROAMATINE) tablet 5 mg, 5 mg, Oral, Before Dialysis, PETRA Briceño, 5 mg at 06/11/24 1317    nystatin (MYCOSTATIN) powder 1 Application, 1 Application, Topical, BID, PETRA Briceño, 1 Application at 06/20/24 0805    ondansetron  (ZOFRAN) injection 4 mg, 4 mg, Intravenous, Q6H PRN, PETRA Briceño, 4 mg at 06/19/24 0302    oxyCODONE (ROXICODONE) IR tablet 5 mg, 5 mg, Oral, Q6H PRN, Dennis Waterman MD, 5 mg at 06/20/24 0759    polyethylene glycol (MIRALAX) packet 17 g, 17 g, Oral, Daily PRN, PETRA Briceño    senna (SENOKOT) tablet 8.6 mg, 1 tablet, Oral, HS, PETRA Briceño, 8.6 mg at 06/17/24 2114    sodium hypochlorite (DAKIN'S HALF-STRENGTH) 0.25 percent topical solution 1 Application, 1 Application, Irrigation, Daily, PETRA Briceño, 1 Application at 06/20/24 0805    torsemide (DEMADEX) tablet 40 mg, 40 mg, Oral, Daily, Kylie Kendall MD, 40 mg at 06/20/24 0800    Laboratory Results:  Results from last 7 days   Lab Units 06/20/24  0538 06/19/24  0307 06/19/24  0301 06/18/24  0603 06/17/24  0450 06/16/24  0510 06/14/24  0751   WBC Thousand/uL 9.65  --  12.82* 12.61* 11.74* 12.57* 11.18*   HEMOGLOBIN g/dL 7.1*  --  8.0* 7.5* 7.5* 7.7* 7.6*   HEMATOCRIT % 22.7*  --  25.2* 24.2* 23.6* 24.2* 24.1*   PLATELETS Thousands/uL 377  --  405* 378 346 316 303   POTASSIUM mmol/L 5.0 4.7  --  5.0 4.5 4.0 4.1   CHLORIDE mmol/L 91* 92*  --  91* 93* 91* 93*   CO2 mmol/L 24 25  --  23 27 27 27   BUN mg/dL 57* 48*  --  66* 56* 48* 48*   CREATININE mg/dL 11.36* 9.30*  --  11.18* 9.48* 8.07* 7.63*   CALCIUM mg/dL 8.5 8.4  --  8.3* 8.1* 8.0* 8.0*       PLEASE NOTE:  This encounter was completed utilizing the [employer] Speech Voice Recognition Software. Grammatical errors, random word insertions, pronoun errors and incomplete sentences are occasional consequences of the system due to software limitations, ambient noise and hardware issues.These may be missed by proof reading prior to affixing electronic signature. Any questions or concerns about the content, text or information contained within the body of this dictation should be directly addressed to the physician for clarification.  Please do not hesitate to call me directly if you have any any questions or concerns.

## 2024-06-20 NOTE — ASSESSMENT & PLAN NOTE
Noted to have worsening hypoxia during hospitalization requiring MFNC  Likely multifactorial including volume overload, suspected underlying ANGY via OHS.  CT chest with evidence of bibasilar effusion with compressive atelectasis  Continue ultrafiltration as tolerated  Continue supplemental oxygen as needed  Incentive spirometry  Patient states he has an upcoming appointment with a sleep specialist and ultimately will undergo a sleep study as an outpatient.  He has been told he has overnight hypoxia.  Ordered a Home O2 eval.

## 2024-06-20 NOTE — PROGRESS NOTES
[facility]  Progress Note  Name: Joaquin Frankel I  MRN: [id number]  Unit/Bed#: 4 [address] I Date of Admission: 6/3/2024   Date of Service: 6/20/2024 I Hospital Day: 17    Assessment & Plan   * Acute renal failure (ARF) (HCC)  Assessment & Plan  POA with creatinine of 6.40 >11.36  BL CR 1.3-1.4  Initiated on HD on 6/7  UA: Hematuria, proteinuria.  UA CR - 5280  Serologies: ANCA titer was negative, anti-GBM was negative.  JOSE ROBERTO/anti-double-stranded DNA was negative.  Kappa/lambda ratio was within normal limits.  Serum and urine immunofixation showed IgG kappa monoclonal bands.  Complement C3 and C4 were normal.  Beta-2 microglobulin was elevated.  CT: No evidence of hydronephrosis  D/W recs nephrology:  Suspect MGRS  HD TTS schedule.  Plan for next dialysis today> OP HD  At this time no evidence of renal recovery.  Continue dialysis schedule.  DC once established stable from renal standpoint for discharge. TTS schedule  Initiated Demadex  Follow-up bone marrow biopsies/renal biopsy      SIRS (systemic inflammatory response syndrome) (HCC)  Assessment & Plan  CT left lower extremity-extensive subcutaneous edema and skin thickening  CT CAP-without any acute abnormality  BCx negative, MRSA surveillance negative  Noted to left lower extremity cellulitis superimposed on lymphedema, now improved  Source likely recurrent draining abscess under the pannus on 6/13.    Cx: Of beta-hemolytic Streptococcus group C  S/P I&D of the pannus by surgery on 6/13.    Continue IV cefazolin post HD, > 6/18  Negative venous Doppler    Wound of abdomen  Assessment & Plan  Seen by wound care, recommended general surgery consultation  Appreciate surgery input  Continue local wound care per wound care nurse  Follow-up in the outpatient wound center    Acute respiratory failure with hypoxia (HCC)  Assessment & Plan  Noted to have worsening hypoxia during hospitalization requiring MFNC  Likely multifactorial  including volume overload, suspected underlying ANGY via OHS.  CT chest with evidence of bibasilar effusion with compressive atelectasis  Continue ultrafiltration as tolerated  Continue supplemental oxygen as needed  Incentive spirometry  Patient states he has an upcoming appointment with a sleep specialist and ultimately will undergo a sleep study as an outpatient.  He has been told he has overnight hypoxia.  Overnight pulse ox      Chronic acquired lymphedema  Assessment & Plan  Patient developed LLE lymphedema after left lower extremity surgical procedure several years ago.  Patient complaining of increasing pain in the left lower extremity likely secondary to volume overload.  Left lower extremity venous Doppler: Negative  Prior referrals ambulatory referral to lymphedema clinic.    Anemia of chronic disease  Assessment & Plan  Patient has anemia of chronic disease likely secondary to underlying nephropathy,?  Myeloma.      He did have some episodes of hematuria.  No further episodes  No further overt bleeding  Hemoglobin gradually down trended to 6.7 g/dL, status post 1 PRBC on 6/11.  Subsequently stable  B12 310, continue supplementation  Iron panel shows iron deficiency.  Status post IV venofer 200mg every other day x 2  Continue to monitor hemoglobin closely and transfuse for hemoglobin less than 7.0 or drastic drop in hemoglobin.    Morbid obesity (HCC)  Assessment & Plan  With BMI of 64.42  Encourage lifestyle modification on discharge    Hyponatremia  Assessment & Plan  Continue to monitor patient is currently on HD, appreciate nephrology recommendations    Diabetes (HCC)  Assessment & Plan  Lab Results   Component Value Date    HGBA1C 5.7 (H) 06/09/2024     Recent Labs     06/19/24 2011 06/20/24  0716 06/20/24  1108 06/20/24  1602   POCGLU 116 87 102 164*     Blood Sugar Average: Last 72 hrs:  (P) [id number]    Amaryl held during his hospitalization.   Acceptable, continue to monitor on current  regimen  Continue diabetic diet.      Hypertension  Assessment & Plan  Held amlodipine and carvedilol for low blood pressure initially  Requiring midodrine predialysis as needed  Blood pressure stable/improving at present  Discontinued Bumex as patient remains anuric  Started Toprol-XL 25 mg daily  Started on torsemide 40 mg daily per nephrology  Monitor intake output, daily weight  Monitor blood pressure    Coronary artery disease  Assessment & Plan  Patient with a history of coronary artery disease with multivessel disease status post stents.  Cardiology following, input appreciated  Held aspirin initially for biopsy, subsequently resumed.  Discussed with nephrology, holding aspirin again in case patient requires kidney biopsy  Continue on Toprol-XL  Continue statin           VTE Pharmacologic Prophylaxis: VTE Score: 7 Moderate Risk (Score 3-4) - Pharmacological DVT Prophylaxis Ordered: heparin.    Mobility:   Basic Mobility Inpatient Raw Score: 23  JH-HLM Goal: 7: Walk 25 feet or more  JH-HLM Achieved: 7: Walk 25 feet or more  JH-HLM Goal NOT achieved. Continue with multidisciplinary rounding and encourage appropriate mobility to improve upon JH-HLM goals.    Patient Centered Rounds: I evaluated the patient without nursing staff present due to alternate patient care responsibilities    Discussions with Specialists or Other Care Team Provider: Nephrology    Education and Discussions with Family / Patient:  Spoke with patient.     Total Time Spent on Date of Encounter in care of patient: 40 mins. This time was spent on one or more of the following: performing physical exam; counseling and coordination of care; obtaining or reviewing history; documenting in the medical record; reviewing/ordering tests, medications or procedures; communicating with other healthcare professionals and discussing with patient's family/caregivers.    Current Length of Stay: 17 day(s)  Current Patient Status: Inpatient   Certification  Statement: The patient will continue to require additional inpatient hospital stay due to clinical course and specialist recs  Discharge Plan: Anticipate discharge in 24-48 hrs to discharge location to be determined pending rehab evaluations.    Code Status: Level 1 - Full Code    Subjective:   Patient seen and examined at bedside while resting, reported no fever no chills, no chest pain, reported becoming needing some O2 overnight, reported supposed to get out of sleep study outpatient.    Objective:     Vitals:   Temp (24hrs), Av.1 °F (36.7 °C), Min:97.5 °F (36.4 °C), Max:98.5 °F (36.9 °C)    Temp:  [97.5 °F (36.4 °C)-98.5 °F (36.9 °C)] 98.5 °F (36.9 °C)  HR:  [56-66] 61  Resp:  [16-19] 16  BP: (112-155)/(43-76) 147/64  SpO2:  [92 %-98 %] 95 %  Body mass index is 64.2 kg/m².     Input and Output Summary (last 24 hours):     Intake/Output Summary (Last 24 hours) at 2024 1903  Last data filed at 2024 1801  Gross per 24 hour   Intake 1020 ml   Output 4000 ml   Net -2980 ml       Physical Exam:   Physical Exam  Vitals and nursing note reviewed.   Constitutional:       General: He is not in acute distress.     Appearance: He is well-developed. He is obese. He is not ill-appearing.   HENT:      Head: Normocephalic and atraumatic.   Eyes:      Conjunctiva/sclera: Conjunctivae normal.   Cardiovascular:      Rate and Rhythm: Normal rate and regular rhythm.      Heart sounds: No murmur heard.  Pulmonary:      Effort: Pulmonary effort is normal. No respiratory distress.      Breath sounds: Normal breath sounds.      Comments: nc, habitus limiting  Abdominal:      Palpations: Abdomen is soft.      Tenderness: There is no abdominal tenderness.   Musculoskeletal:         General: No swelling.      Cervical back: Neck supple.      Right lower leg: Edema present.      Left lower leg: Edema (3+) present.   Skin:     General: Skin is warm and dry.      Capillary Refill: Capillary refill takes less than 2 seconds.    Neurological:      Mental Status: He is alert.   Psychiatric:         Mood and Affect: Mood normal.          Additional Data:     Labs:  Results from last 7 days   Lab Units 06/20/24  0538 06/19/24  0301   WBC Thousand/uL 9.65 12.82*   HEMOGLOBIN g/dL 7.1* 8.0*   HEMATOCRIT % 22.7* 25.2*   PLATELETS Thousands/uL 377 405*   BANDS PCT %  --  1   SEGS PCT % 73  --    LYMPHO PCT % 13* 14   MONO PCT % 8 3*   EOS PCT % 5 3     Results from last 7 days   Lab Units 06/20/24  0538   SODIUM mmol/L 128*   POTASSIUM mmol/L 5.0   CHLORIDE mmol/L 91*   CO2 mmol/L 24   BUN mg/dL 57*   CREATININE mg/dL 11.36*   ANION GAP mmol/L 13   CALCIUM mg/dL 8.5   GLUCOSE RANDOM mg/dL 88           Results from last 7 days   Lab Units 06/20/24  1602 06/20/24  1108 06/20/24  0716 06/19/24 2011 06/19/24  1621 06/19/24  1121 06/19/24  0719 06/18/24 2000 06/18/24  1617 06/18/24  1103 06/18/24  0730 06/17/24  2058   POC GLUCOSE mg/dl 164* 102 87 116 104 105 107 158* 119 125 100 151*               Lines/Drains:  Invasive Devices       Peripheral Intravenous Line  Duration             Peripheral IV 06/18/24 Dorsal (posterior);Right Hand 2 days              Hemodialysis Catheter  Duration             HD Permanent Double Catheter 6 days                      Imaging: Reviewed radiology reports from this admission including: Venous duplex    Recent Cultures (last 7 days):           Last 24 Hours Medication List:   Current Facility-Administered Medications   Medication Dose Route Frequency Provider Last Rate    atorvastatin  80 mg Oral Daily Holly PETRA Shell      Cholecalciferol  5,000 Units Oral Daily Holly PETRA Shell      cyanocobalamin  1,000 mcg Oral Daily Holly PETRA Shell      docusate sodium  100 mg Oral BID Holly PETRA Shell      gabapentin  300 mg Oral BID Holly Bush PETRA Sifuentes      heparin (porcine)  5,000 Units Subcutaneous Q8H [facility] PETRA Briceño       HYDROmorphone  0.5 mg Intravenous Q4H PRN Dennis Waterman MD      insulin lispro  1-6 Units Subcutaneous HS [place] Rachelle Sifuentes, PETRA      insulin lispro  2-12 Units Subcutaneous TID AC [place] Rachelle Sifuentes, PETRA      lidocaine  1 patch Topical Daily [place] Rachelle Sifuentes, CRNP      metoprolol succinate  25 mg Oral Daily Jose De Jesus Cardozo PA-C      midodrine  5 mg Oral Before Dialysis [place] Rachelle Sifuentes, PETRA      nystatin  1 Application Topical BID [place] Rachelle Sifuentes, PETRA      ondansetron  4 mg Intravenous Q6H PRN [place] Rachelle Sifuentes, PETRA      oxyCODONE  5 mg Oral Q6H PRN Dennis Waterman MD      polyethylene glycol  17 g Oral Daily PRN [place] Rachelle Sifuentes, PETRA      senna  1 tablet Oral HS [facility], PETRA      sodium hypochlorite  1 Application Irrigation Daily [place] Rachelle Sifuentes, CRNP      torsemide  40 mg Oral Daily Kylie Kendall MD          Today, Patient Was Seen By: Alyssa Levy MD    **Please Note: This note may have been constructed using a voice recognition system.**

## 2024-06-20 NOTE — PLAN OF CARE
Problem: Prexisting or High Potential for Compromised Skin Integrity  Goal: Skin integrity is maintained or improved  Description: INTERVENTIONS:  - Identify patients at risk for skin breakdown  - Assess and monitor skin integrity  - Assess and monitor nutrition and hydration status  - Monitor labs   - Assess for incontinence   - Turn and reposition patient  - Assist with mobility/ambulation  - Relieve pressure over bony prominences  - Avoid friction and shearing  - Provide appropriate hygiene as needed including keeping skin clean and dry  - Evaluate need for skin moisturizer/barrier cream  - Collaborate with interdisciplinary team   - Patient/family teaching  - Consider wound care consult   Outcome: Progressing     Problem: PAIN - ADULT  Goal: Verbalizes/displays adequate comfort level or baseline comfort level  Description: Interventions:  - Encourage patient to monitor pain and request assistance  - Assess pain using appropriate pain scale  - Administer analgesics based on type and severity of pain and evaluate response  - Implement non-pharmacological measures as appropriate and evaluate response  - Consider cultural and social influences on pain and pain management  - Notify physician/advanced practitioner if interventions unsuccessful or patient reports new pain  Outcome: Progressing     Problem: INFECTION - ADULT  Goal: Absence or prevention of progression during hospitalization  Description: INTERVENTIONS:  - Assess and monitor for signs and symptoms of infection  - Monitor lab/diagnostic results  - Monitor all insertion sites, i.e. indwelling lines, tubes, and drains  - [place] appropriate cooling/warming therapies per order  - Administer medications as ordered  - Instruct and encourage patient and family to use good hand hygiene technique  - Identify and instruct in appropriate isolation precautions for identified infection/condition  Outcome: Progressing     Problem: SAFETY ADULT  Goal: Patient will  remain free of falls  Description: INTERVENTIONS:  - Educate patient/family on patient safety including physical limitations  - Instruct patient to call for assistance with activity   - Consult OT/PT to assist with strengthening/mobility   - Keep Call bell within reach  - Keep bed low and locked with side rails adjusted as appropriate  - Keep care items and personal belongings within reach  - Initiate and maintain comfort rounds  - Make Fall Risk Sign visible to staff  - Apply yellow socks and bracelet for high fall risk patients  - Consider moving patient to room near nurses station  Outcome: Progressing     Problem: CARDIOVASCULAR - ADULT  Goal: Maintains optimal cardiac output and hemodynamic stability  Description: INTERVENTIONS:  - Monitor I/O, vital signs and rhythm  - Monitor for S/S and trends of decreased cardiac output  - Administer and titrate ordered vasoactive medications to optimize hemodynamic stability  - Assess quality of pulses, skin color and temperature  - Assess for signs of decreased coronary artery perfusion  - Instruct patient to report change in severity of symptoms  Outcome: Progressing  Goal: Absence of cardiac dysrhythmias or at baseline rhythm  Description: INTERVENTIONS:  - Continuous cardiac monitoring, vital signs, obtain 12 lead EKG if ordered  - Administer antiarrhythmic and heart rate control medications as ordered  - Monitor electrolytes and administer replacement therapy as ordered  Outcome: Progressing     Problem: RESPIRATORY - ADULT  Goal: Achieves optimal ventilation and oxygenation  Description: INTERVENTIONS:  - Assess for changes in respiratory status  - Assess for changes in mentation and behavior  - Position to facilitate oxygenation and minimize respiratory effort  - Oxygen administered by appropriate delivery if ordered  - Initiate smoking cessation education as indicated  - Encourage broncho-pulmonary hygiene including cough, deep breathe, Incentive Spirometry  -  Assess the need for suctioning and aspirate as needed  - Assess and instruct to report SOB or any respiratory difficulty  - Respiratory Therapy support as indicated  Outcome: Progressing     Problem: SKIN/TISSUE INTEGRITY - ADULT  Goal: Incision(s), wounds(s) or drain site(s) healing without S/S of infection  Description: INTERVENTIONS  - Assess and document dressing, incision, wound bed, drain sites and surrounding tissue  - Provide patient and family education  - Perform skin care/dressing changes  Outcome: Progressing  Goal: Skin Integrity remains intact(Skin Breakdown Prevention)  Description: Assess:  -Perform Juan assessment every shift  -Clean and moisturize skin every shift  -Inspect skin when repositioning, toileting, and assisting with ADLS  -Assess under medical devices such as masimo every shift  -Assess extremities for adequate circulation and sensation     Bed Management:  -Have minimal linens on bed & keep smooth, unwrinkled  -Change linens as needed when moist or perspiring  -Avoid sitting or lying in one position for more than 2 hours while in bed  -Keep HOB at 303degree  Activity:  -Mobilize patient 3 times a day  -Encourage activity and walks on unit  -Encourage or provide ROM exercises   -Use appropriate equipment to lift or move patient in bed  -Instruct/ Assist with weight shifting every 15 minutes when out of bed in chair  -Consider limitation of chair time 4 hour intervals    Skin Care:  -Avoid use of baby powder, tape, friction and shearing, hot water or constrictive clothing  -Relieve pressure over bony prominences using waffle cushion.  -Do not massage red bony areas    Next Steps:   -Consider consults to  interdisciplinary teams such as wound care  Outcome: Progressing

## 2024-06-21 PROBLEM — R65.10 SIRS (SYSTEMIC INFLAMMATORY RESPONSE SYNDROME) (HCC): Status: RESOLVED | Noted: 2024-06-09 | Resolved: 2024-06-21

## 2024-06-21 LAB
2HR DELTA HS TROPONIN: 2 NG/L
4HR DELTA HS TROPONIN: 3 NG/L
ANION GAP SERPL CALCULATED.3IONS-SCNC: 10 MMOL/L (ref 4–13)
ATRIAL RATE: 55 BPM
BASOPHILS # BLD AUTO: 0.05 THOUSANDS/ÂΜL (ref 0–0.1)
BASOPHILS NFR BLD AUTO: 1 % (ref 0–1)
BUN SERPL-MCNC: 44 MG/DL (ref 5–25)
CALCIUM SERPL-MCNC: 8.2 MG/DL (ref 8.4–10.2)
CARDIAC TROPONIN I PNL SERPL HS: 12 NG/L
CARDIAC TROPONIN I PNL SERPL HS: 14 NG/L
CARDIAC TROPONIN I PNL SERPL HS: 15 NG/L
CHLORIDE SERPL-SCNC: 92 MMOL/L (ref 96–108)
CO2 SERPL-SCNC: 26 MMOL/L (ref 21–32)
CREAT SERPL-MCNC: 9.46 MG/DL (ref 0.6–1.3)
EOSINOPHIL # BLD AUTO: 0.4 THOUSAND/ÂΜL (ref 0–0.61)
EOSINOPHIL NFR BLD AUTO: 5 % (ref 0–6)
ERYTHROCYTE [DISTWIDTH] IN BLOOD BY AUTOMATED COUNT: 14.1 % (ref 11.6–15.1)
GFR SERPL CREATININE-BSD FRML MDRD: 6 ML/MIN/1.73SQ M
GLUCOSE SERPL-MCNC: 105 MG/DL (ref 65–140)
GLUCOSE SERPL-MCNC: 83 MG/DL (ref 65–140)
GLUCOSE SERPL-MCNC: 86 MG/DL (ref 65–140)
GLUCOSE SERPL-MCNC: 89 MG/DL (ref 65–140)
GLUCOSE SERPL-MCNC: 96 MG/DL (ref 65–140)
HCT VFR BLD AUTO: 21 % (ref 36.5–49.3)
HGB BLD-MCNC: 6.6 G/DL (ref 12–17)
IMM GRANULOCYTES # BLD AUTO: 0.05 THOUSAND/UL (ref 0–0.2)
IMM GRANULOCYTES NFR BLD AUTO: 1 % (ref 0–2)
LYMPHOCYTES # BLD AUTO: 1.14 THOUSANDS/ÂΜL (ref 0.6–4.47)
LYMPHOCYTES NFR BLD AUTO: 14 % (ref 14–44)
MCH RBC QN AUTO: 28.3 PG (ref 26.8–34.3)
MCHC RBC AUTO-ENTMCNC: 31.4 G/DL (ref 31.4–37.4)
MCV RBC AUTO: 90 FL (ref 82–98)
MONOCYTES # BLD AUTO: 0.72 THOUSAND/ÂΜL (ref 0.17–1.22)
MONOCYTES NFR BLD AUTO: 9 % (ref 4–12)
NEUTROPHILS # BLD AUTO: 5.94 THOUSANDS/ÂΜL (ref 1.85–7.62)
NEUTS SEG NFR BLD AUTO: 70 % (ref 43–75)
NRBC BLD AUTO-RTO: 0 /100 WBCS
P AXIS: 70 DEGREES
PLATELET # BLD AUTO: 340 THOUSANDS/UL (ref 149–390)
PMV BLD AUTO: 8.8 FL (ref 8.9–12.7)
POTASSIUM SERPL-SCNC: 4.8 MMOL/L (ref 3.5–5.3)
PR INTERVAL: 186 MS
QRS AXIS: 2 DEGREES
QRSD INTERVAL: 84 MS
QT INTERVAL: 442 MS
QTC INTERVAL: 422 MS
RBC # BLD AUTO: 2.33 MILLION/UL (ref 3.88–5.62)
SODIUM SERPL-SCNC: 128 MMOL/L (ref 135–147)
T WAVE AXIS: 20 DEGREES
VENTRICULAR RATE: 55 BPM
WBC # BLD AUTO: 8.3 THOUSAND/UL (ref 4.31–10.16)

## 2024-06-21 PROCEDURE — 99233 SBSQ HOSP IP/OBS HIGH 50: CPT | Performed by: STUDENT IN AN ORGANIZED HEALTH CARE EDUCATION/TRAINING PROGRAM

## 2024-06-21 PROCEDURE — 82948 REAGENT STRIP/BLOOD GLUCOSE: CPT

## 2024-06-21 PROCEDURE — 94762 N-INVAS EAR/PLS OXIMTRY CONT: CPT

## 2024-06-21 PROCEDURE — 80048 BASIC METABOLIC PNL TOTAL CA: CPT | Performed by: STUDENT IN AN ORGANIZED HEALTH CARE EDUCATION/TRAINING PROGRAM

## 2024-06-21 PROCEDURE — 93005 ELECTROCARDIOGRAM TRACING: CPT

## 2024-06-21 PROCEDURE — 85025 COMPLETE CBC W/AUTO DIFF WBC: CPT | Performed by: STUDENT IN AN ORGANIZED HEALTH CARE EDUCATION/TRAINING PROGRAM

## 2024-06-21 PROCEDURE — 84484 ASSAY OF TROPONIN QUANT: CPT | Performed by: STUDENT IN AN ORGANIZED HEALTH CARE EDUCATION/TRAINING PROGRAM

## 2024-06-21 PROCEDURE — 99232 SBSQ HOSP IP/OBS MODERATE 35: CPT | Performed by: INTERNAL MEDICINE

## 2024-06-21 PROCEDURE — 97535 SELF CARE MNGMENT TRAINING: CPT

## 2024-06-21 RX ORDER — HYDROMORPHONE HCL/PF 1 MG/ML
0.5 SYRINGE (ML) INJECTION
Status: DISCONTINUED | OUTPATIENT
Start: 2024-06-21 | End: 2024-06-26 | Stop reason: HOSPADM

## 2024-06-21 RX ADMIN — OXYCODONE HYDROCHLORIDE 5 MG: 5 TABLET ORAL at 08:44

## 2024-06-21 RX ADMIN — ATORVASTATIN CALCIUM 80 MG: 80 TABLET, FILM COATED ORAL at 08:15

## 2024-06-21 RX ADMIN — CYANOCOBALAMIN TAB 500 MCG 1000 MCG: 500 TAB at 08:15

## 2024-06-21 RX ADMIN — HYDROMORPHONE HYDROCHLORIDE 0.5 MG: 1 INJECTION, SOLUTION INTRAMUSCULAR; INTRAVENOUS; SUBCUTANEOUS at 03:52

## 2024-06-21 RX ADMIN — OXYCODONE HYDROCHLORIDE 5 MG: 5 TABLET ORAL at 16:29

## 2024-06-21 RX ADMIN — HYDROMORPHONE HYDROCHLORIDE 0.5 MG: 1 INJECTION, SOLUTION INTRAMUSCULAR; INTRAVENOUS; SUBCUTANEOUS at 18:01

## 2024-06-21 RX ADMIN — NYSTATIN 1 APPLICATION: 100000 POWDER TOPICAL at 18:40

## 2024-06-21 RX ADMIN — Medication 5000 UNITS: at 08:14

## 2024-06-21 RX ADMIN — HEPARIN SODIUM 5000 UNITS: 5000 INJECTION, SOLUTION INTRAVENOUS; SUBCUTANEOUS at 13:04

## 2024-06-21 RX ADMIN — NYSTATIN 1 APPLICATION: 100000 POWDER TOPICAL at 09:48

## 2024-06-21 RX ADMIN — HYOSCYAMINE SULFATE 1 APPLICATION: 16 SOLUTION at 08:16

## 2024-06-21 RX ADMIN — HEPARIN SODIUM 5000 UNITS: 5000 INJECTION, SOLUTION INTRAVENOUS; SUBCUTANEOUS at 21:47

## 2024-06-21 RX ADMIN — HYDROMORPHONE HYDROCHLORIDE 0.5 MG: 1 INJECTION, SOLUTION INTRAMUSCULAR; INTRAVENOUS; SUBCUTANEOUS at 22:03

## 2024-06-21 RX ADMIN — TORSEMIDE 40 MG: 20 TABLET ORAL at 08:15

## 2024-06-21 RX ADMIN — HYDROMORPHONE HYDROCHLORIDE 0.5 MG: 1 INJECTION, SOLUTION INTRAMUSCULAR; INTRAVENOUS; SUBCUTANEOUS at 09:47

## 2024-06-21 RX ADMIN — HEPARIN SODIUM 5000 UNITS: 5000 INJECTION, SOLUTION INTRAVENOUS; SUBCUTANEOUS at 05:25

## 2024-06-21 RX ADMIN — HYDROMORPHONE HYDROCHLORIDE 0.5 MG: 1 INJECTION, SOLUTION INTRAMUSCULAR; INTRAVENOUS; SUBCUTANEOUS at 13:03

## 2024-06-21 RX ADMIN — METOPROLOL SUCCINATE 25 MG: 25 TABLET, EXTENDED RELEASE ORAL at 08:14

## 2024-06-21 NOTE — PROGRESS NOTES
[facility]  Progress Note  Name: Joaquin Frankel I  MRN: [id number]  Unit/Bed#: 4 [address] Date of Admission: 6/3/2024   Date of Service: 6/21/2024 I Hospital Day: 18    Assessment & Plan   * Acute renal failure (ARF) (HCC)  Assessment & Plan  POA Cr 6.40 >11.36 > 9.46    BL CR 1.3-1.4  Initiated on HD on 6/7  UA: Hematuria, proteinuria.  UA CR - 5280  Serologies: ANCA titer was negative, anti-GBM was negative.  JOSE ROBERTO/anti-double-stranded DNA was negative.  Kappa/lambda ratio was within normal limits.  Serum and urine immunofixation showed IgG kappa monoclonal bands.  Complement C3 and C4 were normal.  Beta-2 microglobulin was elevated.  CT: No evidence of hydronephrosis  D/W recs nephrology:  Suspect MGRS  HD TTS schedule.  Plan for next dialysis today> OP HD  At this time no evidence of renal recovery.  Continue dialysis schedule.  DC once established stable from renal standpoint for discharge. TTS schedule  Initiated Demadex  Follow-up bone marrow biopsies/renal biopsy      SIRS (systemic inflammatory response syndrome) (HCC)-resolved as of 6/21/2024  Assessment & Plan  CT left lower extremity-extensive subcutaneous edema and skin thickening  CT CAP-without any acute abnormality  BCx negative, MRSA surveillance negative  Noted to left lower extremity cellulitis superimposed on lymphedema, now improved  Source likely recurrent draining abscess under the pannus on 6/13.    Cx: Of beta-hemolytic Streptococcus group C  S/P I&D of the pannus by surgery on 6/13.    Continue IV cefazolin post HD, > 6/18  Negative venous Doppler    Hyponatremia  Assessment & Plan  Na-128  2/2 Vollume overload  Continue on HD  D/W nephrology recs:  Fluid restrict  HD    Wound of abdomen  Assessment & Plan  Seen by wound care, recommended general surgery consultation  Appreciate surgery input  Continue local wound care per wound care nurse  Follow-up in the outpatient wound center    Acute respiratory failure  with hypoxia (HCC)  Assessment & Plan  Noted to have worsening hypoxia during hospitalization requiring MFNC  Likely multifactorial including volume overload, suspected underlying ANGY via OHS.  CT chest with evidence of bibasilar effusion with compressive atelectasis  Continue ultrafiltration as tolerated  Continue supplemental oxygen as needed  Incentive spirometry  Patient states he has an upcoming appointment with a sleep specialist and ultimately will undergo a sleep study as an outpatient.  He has been told he has overnight hypoxia.  Overnight pulse ox      Chronic acquired lymphedema  Assessment & Plan  Patient developed LLE lymphedema after left lower extremity surgical procedure several years ago.  Patient complaining of increasing pain in the left lower extremity likely secondary to volume overload.  Left lower extremity venous Doppler: Negative  Prior referrals ambulatory referral to lymphedema clinic.    Anemia of chronic disease  Assessment & Plan  Patient has anemia of chronic disease likely secondary to underlying nephropathy,?  Myeloma.      He did have some episodes of hematuria.  No further episodes  No further overt bleeding  Hemoglobin gradually down trended to 6.7 g/dL, status post 1 PRBC on 6/11.  Subsequently stable  B12 310, continue supplementation  Iron panel shows iron deficiency.  Status post IV venofer 200mg every other day x 2  Continue to monitor hemoglobin closely and transfuse for hemoglobin less than 7.0 or drastic drop in hemoglobin.    Morbid obesity (HCC)  Assessment & Plan  With BMI of 64.42  Encourage lifestyle modification on discharge    Diabetes (HCC)  Assessment & Plan  Lab Results   Component Value Date    HGBA1C 5.7 (H) 06/09/2024     Recent Labs     06/20/24  1602 06/20/24  2057 06/21/24  0725 06/21/24  1119   POCGLU 164* 109 83 89     Blood Sugar Average: Last 72 hrs:  (P) [id number]    Amaryl held during his hospitalization.   Acceptable, continue to monitor  on current regimen  Continue diabetic diet.      Hypertension  Assessment & Plan  Held amlodipine and carvedilol for low blood pressure initially  Requiring midodrine predialysis as needed  Blood pressure stable/improving at present  Nephro recs following:    Started Toprol-XL 25 mg daily  Started on torsemide 40 mg daily per nephrology  Monitor intake output, daily weight  Monitor blood pressure    Coronary artery disease  Assessment & Plan  Patient with a history of coronary artery disease with multivessel disease status post stents.  Cardiology following, input appreciated  Held aspirin initially for biopsy, subsequently resumed.  Discussed with nephrology, holding aspirin again in case patient requires kidney biopsy  Continue on Toprol-XL  Continue statin           VTE Pharmacologic Prophylaxis: VTE Score: 7 Moderate Risk (Score 3-4) - Pharmacological DVT Prophylaxis Ordered: heparin.    Mobility:   Basic Mobility Inpatient Raw Score: 23  JH-HLM Goal: 7: Walk 25 feet or more  JH-HLM Achieved: 6: Walk 10 steps or more  JH-HLM Goal NOT achieved. Continue with multidisciplinary rounding and encourage appropriate mobility to improve upon JH-HLM goals.    Patient Centered Rounds: I evaluated the patient without nursing staff present due to alternate patient care responsibilities    Discussions with Specialists or Other Care Team Provider: Nephrology    Education and Discussions with Family / Patient:  Spoke with patient.     Total Time Spent on Date of Encounter in care of patient: 40 mins. This time was spent on one or more of the following: performing physical exam; counseling and coordination of care; obtaining or reviewing history; documenting in the medical record; reviewing/ordering tests, medications or procedures; communicating with other healthcare professionals and discussing with patient's family/caregivers.    Current Length of Stay: 18 day(s)  Current Patient Status: Inpatient   Certification Statement:  The patient will continue to require additional inpatient hospital stay due to clinical course and specialist recs  Discharge Plan: Anticipate discharge in 24-48 hrs to discharge location to be determined pending rehab evaluations.    Code Status: Level 1 - Full Code    Subjective:   Patient seen and examined at bedside and reported no chest pain, sob that's worsening or any other concerns, reported minimal output, discussed specialist recs.    Objective:     Vitals:   Temp (24hrs), Av.2 °F (36.8 °C), Min:97.6 °F (36.4 °C), Max:98.6 °F (37 °C)    Temp:  [97.6 °F (36.4 °C)-98.6 °F (37 °C)] 97.6 °F (36.4 °C)  HR:  [61-66] 64  Resp:  [16-19] 19  BP: (112-151)/(54-72) 151/72  SpO2:  [94 %-98 %] 95 %  Body mass index is 63.12 kg/m².     Input and Output Summary (last 24 hours):     Intake/Output Summary (Last 24 hours) at 2024 1439  Last data filed at 2024 2100  Gross per 24 hour   Intake 1020 ml   Output 4000 ml   Net -2980 ml       Physical Exam:   Physical Exam  Vitals and nursing note reviewed.   Constitutional:       General: He is not in acute distress.     Appearance: He is well-developed. He is obese.   HENT:      Head: Normocephalic and atraumatic.   Eyes:      Conjunctiva/sclera: Conjunctivae normal.   Cardiovascular:      Rate and Rhythm: Normal rate and regular rhythm.      Heart sounds: No murmur heard.  Pulmonary:      Effort: Pulmonary effort is normal. No respiratory distress.      Breath sounds: Normal breath sounds.   Abdominal:      Palpations: Abdomen is soft.      Tenderness: There is no abdominal tenderness. There is no guarding or rebound.   Musculoskeletal:         General: No swelling.      Cervical back: Neck supple.      Right lower leg: Edema present.      Left lower leg: Edema (3) present.   Skin:     General: Skin is warm and dry.      Capillary Refill: Capillary refill takes less than 2 seconds.   Neurological:      Mental Status: He is alert and oriented to person, place, and  time.   Psychiatric:         Mood and Affect: Mood normal.         Behavior: Behavior normal.          Additional Data:     Labs:  Results from last 7 days   Lab Units 06/21/24  0531 06/20/24  0538 06/19/24  0301   WBC Thousand/uL 8.30   < > 12.82*   HEMOGLOBIN g/dL 6.6*   < > 8.0*   HEMATOCRIT % 21.0*   < > 25.2*   PLATELETS Thousands/uL 340   < > 405*   BANDS PCT %  --   --  1   SEGS PCT % 70   < >  --    LYMPHO PCT % 14   < > 14   MONO PCT % 9   < > 3*   EOS PCT % 5   < > 3    < > = values in this interval not displayed.     Results from last 7 days   Lab Units 06/21/24  0531   SODIUM mmol/L 128*   POTASSIUM mmol/L 4.8   CHLORIDE mmol/L 92*   CO2 mmol/L 26   BUN mg/dL 44*   CREATININE mg/dL 9.46*   ANION GAP mmol/L 10   CALCIUM mg/dL 8.2*   GLUCOSE RANDOM mg/dL 86           Results from last 7 days   Lab Units 06/21/24  1119 06/21/24  0725 06/20/24  2057 06/20/24  1602 06/20/24  1108 06/20/24  0716 06/19/24 2011 06/19/24  1621 06/19/24  1121 06/19/24  0719 06/18/24 2000 06/18/24  1617   POC GLUCOSE mg/dl 89 83 109 164* 102 87 116 104 105 107 158* 119               Lines/Drains:  Invasive Devices       Peripheral Intravenous Line  Duration             Peripheral IV 06/18/24 Dorsal (posterior);Right Hand 3 days              Hemodialysis Catheter  Duration             HD Permanent Double Catheter 6 days                      Imaging: Reviewed radiology reports from this admission including: Venous duplex    Recent Cultures (last 7 days):           Last 24 Hours Medication List:   Current Facility-Administered Medications   Medication Dose Route Frequency Provider Last Rate    atorvastatin  80 mg Oral Daily Holly Bush Shoemaker, CRNP      Cholecalciferol  5,000 Units Oral Daily Holly Bush Shoemaker, CRNP      cyanocobalamin  1,000 mcg Oral Daily Holly Bush Shoemaker, CRNP      docusate sodium  100 mg Oral BID Holly Bush PETRA Sifuentes      heparin (porcine)  5,000 Units Subcutaneous Q8H SHAZIA  Holly Sifuentes, TOYANP      HYDROmorphone  0.5 mg Intravenous Q3H PRN Alyssa Levy MD      insulin lispro  1-6 Units Subcutaneous HS Holly Rachelle Sifuentes, CRNP      insulin lispro  2-12 Units Subcutaneous TID AC Holly Sifuentes, TOYANP      lidocaine  1 patch Topical Daily [place] Rachelle Sifuentes, CRNP      metoprolol succinate  25 mg Oral Daily Jose De Jesus Cardozo PA-C      midodrine  5 mg Oral Before Dialysis Holly Rachelle Sifuentes, PETRA      nystatin  1 Application Topical BID Holly Rachelle Sifuentes, CRNP      ondansetron  4 mg Intravenous Q6H PRN Hollyjacklyn Sifuentes, PETRA      oxyCODONE  5 mg Oral Q6H PRN Dennis Waterman MD      polyethylene glycol  17 g Oral Daily PRN Holly Rachelle Sifuentes, PETRA      senna  1 tablet Oral HS [place] Rachelle Sifuentes, CRNP      sodium hypochlorite  1 Application Irrigation Daily Holly Rachelle Sifuentes, CRNP      torsemide  40 mg Oral Daily Kylie Kendall MD          Today, Patient Was Seen By: Alyssa Levy MD    **Please Note: This note may have been constructed using a voice recognition system.**

## 2024-06-21 NOTE — PROGRESS NOTES
NEPHROLOGY PROGRESS NOTE   Joaquin Frankel 44 y.o. male MRN: [id number]  Unit/Bed#: [address] [place] [address] Encounter: [id number]  Reason for Consult: ARF      SUMMARY:    45 yo man with PMH of morbid obesity, lower extremity lymphedema, diabetes, hypertension, CAD p/w shortness of breath for the last 2 weeks.  Nephrology is consulted for management of CLOVIS        ASSESSMENT and PLAN:     Acute renal failure, currently dialysis dependent  -- Present on admission with suspected underlying chronic kidney disease as prior baseline creatinine was 1.3 to 1.6 mg/dL with previous episodes of acute kidney injury.  Admission creatinine 6 mg/dL  -- Peak creatinine was 10.4 mg/dL prior to dialysis.  -- Initially started on CRRT then transition to intermittent hemodialysis.  Renal placement initiation on June 7th  -- Serologies: ANCA titer was negative, anti-GBM was negative.  JOSE ROBERTO/anti-double-stranded DNA was negative.  Kappa/lambda ratio was within normal limits.  Serum and urine immunofixation showed IgG kappa monoclonal bands.  Complement C3 and C4 were normal.  Beta-2 microglobulin was elevated.  -- Renal imaging via CAT scan showed no evidence of hydronephrosis  -- Access: Right IJ permacath  -- Status post bone marrow biopsy on June 13.  Awaiting results.  If evidence of multiple myeloma we will then proceed with management plan as per hematology.  If bone marrow biopsy is negative will consider a renal biopsy.  Suspect MGRS.  Awaiting results.  He should have a follow-up with hematology as an outpatient.  If the bone marrow biopsies unremarkable after he has been well dialyzed with some more fluid removal we can plan for renal biopsy as an outpatient  -- Currently dialyzing on a TTS schedule, planning for Monday Wednesday Friday as an outpatient  --Continue torsemide will monitor for renal car reason outpatient.  -- Plan for dialysis tomorrow, plan to be Monday Wednesday Friday as an outpatient at [facility].  Will plan  for dialysis tomorrow no objections discharge after dialysis tomorrow.  Outpatient HD orders have been sent to the outpatient unit     Chronic kidney disease stage III  -- Prior baseline creatinine 1.3 to 1.6 mg/dL  -- Presumably secondary to diabetic kidney disease, hypertensive nephrosclerosis, obesity related renal dysfunction  -- Suspect she will be at a new baseline creatinine if he does recover     Anemia of chronic kidney disease  -- Follow-up bone marrow biopsy  -- SPEP and UPEP positive for IgG kappa monoclonal bands  --Will need follow-up with hematology as an outpatient  --Follow-up bone marrow biopsy if evidence of multiple myeloma would need recommendations in regards to JOSSELYN from hematology  -- Received IV iron     Hypertension  --Volume mediated, obese with a BMI greater than 60  -- Blood pressures currently controlled volume mediated  --Continue ultrafiltration with dialysis  --Blood pressure controlled  -- Continue torsemide     Mineral bone disorder-chronic kidney disease  -- Secondary hyperparathyroidism of renal origin.  -- Vitamin D deficiency  -- Hyperphosphatemia  --Currently on vitamin D supplementation  -- Continue current management     Hyponatremia  --Sodium stable at 128  --Volume mediated  --Continue fluid restriction and ultrafiltration on dialysis  -- Continue torsemide     Systemic inflammatory response syndrome  -- CT of the left lower extremity showed extensive subcutaneous edema and skin thickening  -- Blood cultures are negative  --Completed course of cefazolin  -- Draining abscess under pannus on June 13 grew out beta-hemolytic Streptococcus group C     Chronic lymphedema     Morbid obesity  -- BMI 64.2 will fluctuate based on volume status      SUBJECTIVE / INTERVAL HISTORY:    No complaints today    OBJECTIVE:  Current Weight: Weight - Scale: (!) 183 kg (403 lb)  Vitals:    06/20/24 2100 06/20/24 2323 06/21/24 0600 06/21/24 0813   BP:  139/67  139/67   BP Location:       Pulse:   64  63   Resp:  19     Temp:  98.6 °F (37 °C)  97.6 °F (36.4 °C)   TempSrc:       SpO2: 94% 96%  98%   Weight:   (!) 183 kg (403 lb)    Height:           Intake/Output Summary (Last 24 hours) at 6/21/2024 0946  Last data filed at 6/20/2024 2100  Gross per 24 hour   Intake 1220 ml   Output 4000 ml   Net -2780 ml       Review of Systems:    Constitutional: Negative for chills and fever.   HENT: Negative for ear pain and sore throat.    Eyes: Negative for pain and visual disturbance.   Respiratory: Negative for cough and shortness of breath.    Cardiovascular: Negative for chest pain and palpitations.   Gastrointestinal: Negative for abdominal pain and vomiting.   Genitourinary: Negative for dysuria and hematuria.   Musculoskeletal: Negative for arthralgias and back pain.   Skin: Negative for color change and rash.   Neurological: Negative for seizures and syncope.   12 point ROS has been reviewed.    Physical Exam  Vitals and nursing note reviewed.   Constitutional:       General: He is not in acute distress.     Appearance: He is well-developed. He is obese.   HENT:      Head: Normocephalic and atraumatic.   Eyes:      General: No scleral icterus.     Conjunctiva/sclera: Conjunctivae normal.      Pupils: Pupils are equal, round, and reactive to light.   Cardiovascular:      Rate and Rhythm: Normal rate and regular rhythm.      Heart sounds: S1 normal and S2 normal. No murmur heard.     No friction rub. No gallop.   Pulmonary:      Effort: Pulmonary effort is normal. No respiratory distress.      Breath sounds: Normal breath sounds. No wheezing or rales.   Abdominal:      General: Bowel sounds are normal.      Palpations: Abdomen is soft.      Tenderness: There is no abdominal tenderness. There is no rebound.   Musculoskeletal:         General: Normal range of motion.      Cervical back: Normal range of motion and neck supple.      Right lower leg: Edema present.      Left lower leg: Edema present.   Skin:      Findings: No rash.   Neurological:      Mental Status: He is alert and oriented to person, place, and time.   Psychiatric:         Behavior: Behavior normal.         Medications:    Current Facility-Administered Medications:     atorvastatin (LIPITOR) tablet 80 mg, 80 mg, Oral, Daily, PETRA Briceño, 80 mg at 06/21/24 0815    Cholecalciferol (VITAMIN D3) tablet 5,000 Units, 5,000 Units, Oral, Daily, PETRA Briceño, 5,000 Units at 06/21/24 0814    cyanocobalamin (VITAMIN B-12) tablet 1,000 mcg, 1,000 mcg, Oral, Daily, PETRA Briceño, 1,000 mcg at 06/21/24 0815    docusate sodium (COLACE) capsule 100 mg, 100 mg, Oral, BID, Holly Sifuentes, PETRA, 100 mg at 06/17/24 0857    heparin (porcine) subcutaneous injection 5,000 Units, 5,000 Units, Subcutaneous, Q8H SHAZIA, PETRA Briceño, 5,000 Units at 06/21/24 0525    HYDROmorphone (DILAUDID) injection 0.5 mg, 0.5 mg, Intravenous, Q3H PRN, Alyssa Levy MD    insulin lispro (HumALOG/ADMELOG) 100 units/mL subcutaneous injection 1-6 Units, 1-6 Units, Subcutaneous, HS, PETRA Briceño, 1 Units at 06/06/24 2150    insulin lispro (HumALOG/ADMELOG) 100 units/mL subcutaneous injection 2-12 Units, 2-12 Units, Subcutaneous, TID AC, 2 Units at 06/20/24 1618 **AND** Fingerstick Glucose (POCT), , , TID AC, PETRA Briceño    lidocaine (LIDODERM) 5 % patch 1 patch, 1 patch, Topical, Daily, PETRA Briceño, 1 patch at 06/17/24 0857    metoprolol succinate (TOPROL-XL) 24 hr tablet 25 mg, 25 mg, Oral, Daily, Jose De Jesus Cardozo PA-C, 25 mg at 06/21/24 0814    midodrine (PROAMATINE) tablet 5 mg, 5 mg, Oral, Before Dialysis, PETRA Briceño, 5 mg at 06/11/24 1317    nystatin (MYCOSTATIN) powder 1 Application, 1 Application, Topical, BID, PETRA Briceño, 1 Application at 06/20/24 1727    ondansetron (ZOFRAN) injection 4 mg, 4 mg, Intravenous,  Q6H PRN, PETRA Briceño, 4 mg at 06/19/24 0302    oxyCODONE (ROXICODONE) IR tablet 5 mg, 5 mg, Oral, Q6H PRN, Dennis Waterman MD, 5 mg at 06/21/24 0844    polyethylene glycol (MIRALAX) packet 17 g, 17 g, Oral, Daily PRN, PETRA Briceño    senna (SENOKOT) tablet 8.6 mg, 1 tablet, Oral, HS, PETRA Briceño, 8.6 mg at 06/17/24 2114    sodium hypochlorite (DAKIN'S HALF-STRENGTH) 0.25 percent topical solution 1 Application, 1 Application, Irrigation, Daily, PETRA Briceño, 1 Application at 06/21/24 0816    torsemide (DEMADEX) tablet 40 mg, 40 mg, Oral, Daily, Kylie Kendall MD, 40 mg at 06/21/24 0815    Laboratory Results:  Results from last 7 days   Lab Units 06/21/24  0531 06/20/24  0538 06/19/24  0307 06/19/24  0301 06/18/24  0603 06/17/24  0450 06/16/24  0510   WBC Thousand/uL 8.30 9.65  --  12.82* 12.61* 11.74* 12.57*   HEMOGLOBIN g/dL 6.6* 7.1*  --  8.0* 7.5* 7.5* 7.7*   HEMATOCRIT % 21.0* 22.7*  --  25.2* 24.2* 23.6* 24.2*   PLATELETS Thousands/uL 340 377  --  405* 378 346 316   POTASSIUM mmol/L 4.8 5.0 4.7  --  5.0 4.5 4.0   CHLORIDE mmol/L 92* 91* 92*  --  91* 93* 91*   CO2 mmol/L 26 24 25  --  23 27 27   BUN mg/dL 44* 57* 48*  --  66* 56* 48*   CREATININE mg/dL 9.46* 11.36* 9.30*  --  11.18* 9.48* 8.07*   CALCIUM mg/dL 8.2* 8.5 8.4  --  8.3* 8.1* 8.0*       PLEASE NOTE:  This encounter was completed utilizing the [employer] Direct Speech Voice Recognition Software. Grammatical errors, random word insertions, pronoun errors and incomplete sentences are occasional consequences of the system due to software limitations, ambient noise and hardware issues.These may be missed by proof reading prior to affixing electronic signature. Any questions or concerns about the content, text or information contained within the body of this dictation should be directly addressed to the physician for clarification. Please do not hesitate to call me directly if  you have any any questions or concerns.

## 2024-06-21 NOTE — PLAN OF CARE
Problem: Prexisting or High Potential for Compromised Skin Integrity  Goal: Skin integrity is maintained or improved  Description: INTERVENTIONS:  - Identify patients at risk for skin breakdown  - Assess and monitor skin integrity  - Assess and monitor nutrition and hydration status  - Monitor labs   - Assess for incontinence   - Turn and reposition patient  - Assist with mobility/ambulation  - Relieve pressure over bony prominences  - Avoid friction and shearing  - Provide appropriate hygiene as needed including keeping skin clean and dry  - Evaluate need for skin moisturizer/barrier cream  - Collaborate with interdisciplinary team   - Patient/family teaching  - Consider wound care consult   Outcome: Progressing     Problem: PAIN - ADULT  Goal: Verbalizes/displays adequate comfort level or baseline comfort level  Description: Interventions:  - Encourage patient to monitor pain and request assistance  - Assess pain using appropriate pain scale  - Administer analgesics based on type and severity of pain and evaluate response  - Implement non-pharmacological measures as appropriate and evaluate response  - Consider cultural and social influences on pain and pain management  - Notify physician/advanced practitioner if interventions unsuccessful or patient reports new pain  Outcome: Progressing     Problem: INFECTION - ADULT  Goal: Absence or prevention of progression during hospitalization  Description: INTERVENTIONS:  - Assess and monitor for signs and symptoms of infection  - Monitor lab/diagnostic results  - Monitor all insertion sites, i.e. indwelling lines, tubes, and drains  - Blevins appropriate cooling/warming therapies per order  - Administer medications as ordered  - Instruct and encourage patient and family to use good hand hygiene technique  - Identify and instruct in appropriate isolation precautions for identified infection/condition  Outcome: Progressing     Problem: SAFETY ADULT  Goal: Patient will  remain free of falls  Description: INTERVENTIONS:  - Educate patient/family on patient safety including physical limitations  - Instruct patient to call for assistance with activity   - Consult OT/PT to assist with strengthening/mobility   - Keep Call bell within reach  - Keep bed low and locked with side rails adjusted as appropriate  - Keep care items and personal belongings within reach  - Initiate and maintain comfort rounds  - Make Fall Risk Sign visible to staff  - Apply yellow socks and bracelet for high fall risk patients  - Consider moving patient to room near nurses station  Outcome: Progressing  Goal: Maintain or return to baseline ADL function  Description: INTERVENTIONS:  -  Assess patient's ability to carry out ADLs; assess patient's baseline for ADL function and identify physical deficits which impact ability to perform ADLs (bathing, care of mouth/teeth, toileting, grooming, dressing, etc.)  - Assess/evaluate cause of self-care deficits   - Assess range of motion  - Assess patient's mobility; develop plan if impaired  - Assess patient's need for assistive devices and provide as appropriate  - Encourage maximum independence but intervene and supervise when necessary  - Involve family in performance of ADLs  - Assess for home care needs following discharge   - Consider OT consult to assist with ADL evaluation and planning for discharge  - Provide patient education as appropriate  Outcome: Progressing  Goal: Maintains/Returns to pre admission functional level  Description: INTERVENTIONS:  - Perform AM-PAC 6 Click Basic Mobility/ Daily Activity assessment daily.  - Set and communicate daily mobility goal to care team and patient/family/caregiver.   - Collaborate with rehabilitation services on mobility goals if consulted  - Out of bed for toileting  - Record patient progress and toleration of activity level   Outcome: Progressing     Problem: CARDIOVASCULAR - ADULT  Goal: Maintains optimal cardiac output  and hemodynamic stability  Description: INTERVENTIONS:  - Monitor I/O, vital signs and rhythm  - Monitor for S/S and trends of decreased cardiac output  - Administer and titrate ordered vasoactive medications to optimize hemodynamic stability  - Assess quality of pulses, skin color and temperature  - Assess for signs of decreased coronary artery perfusion  - Instruct patient to report change in severity of symptoms  Outcome: Progressing  Goal: Absence of cardiac dysrhythmias or at baseline rhythm  Description: INTERVENTIONS:  - Continuous cardiac monitoring, vital signs, obtain 12 lead EKG if ordered  - Administer antiarrhythmic and heart rate control medications as ordered  - Monitor electrolytes and administer replacement therapy as ordered  Outcome: Progressing     Problem: RESPIRATORY - ADULT  Goal: Achieves optimal ventilation and oxygenation  Description: INTERVENTIONS:  - Assess for changes in respiratory status  - Assess for changes in mentation and behavior  - Position to facilitate oxygenation and minimize respiratory effort  - Oxygen administered by appropriate delivery if ordered  - Initiate smoking cessation education as indicated  - Encourage broncho-pulmonary hygiene including cough, deep breathe, Incentive Spirometry  - Assess the need for suctioning and aspirate as needed  - Assess and instruct to report SOB or any respiratory difficulty  - Respiratory Therapy support as indicated  Outcome: Progressing     Problem: SKIN/TISSUE INTEGRITY - ADULT  Goal: Incision(s), wounds(s) or drain site(s) healing without S/S of infection  Description: INTERVENTIONS  - Assess and document dressing, incision, wound bed, drain sites and surrounding tissue  - Provide patient and family education  - Perform skin care/dressing changes  Outcome: Progressing  Goal: Skin Integrity remains intact(Skin Breakdown Prevention)  Description: Assess:  -Assess extremities for adequate circulation and sensation     Bed  Management:  -Have minimal linens on bed & keep smooth, unwrinkled  -Change linens as needed when moist or perspiring    Toileting:  -Offer bedside commode    Activity:  -Encourage activity and walks on unit  -Encourage or provide ROM exercises   -Use appropriate equipment to lift or move patient in bed    Skin Care:  -Avoid use of baby powder, tape, friction and shearing, hot water or constrictive clothing  -Do not massage red bony areas      Outcome: Progressing     Problem: NEUROSENSORY - ADULT  Goal: Achieves maximal functionality and self care  Description: INTERVENTIONS  - Monitor swallowing and airway patency with patient fatigue and changes in neurological status  - Encourage and assist patient to increase activity and self care.   - Encourage visually impaired, hearing impaired and aphasic patients to use assistive/communication devices  Outcome: Progressing     Problem: METABOLIC, FLUID AND ELECTROLYTES - ADULT  Goal: Electrolytes maintained within normal limits  Description: INTERVENTIONS:  - Monitor labs and assess patient for signs and symptoms of electrolyte imbalances  - Administer electrolyte replacement as ordered  - Monitor response to electrolyte replacements, including repeat lab results as appropriate  - Instruct patient on fluid and nutrition as appropriate  Outcome: Progressing  Goal: Fluid balance maintained  Description: INTERVENTIONS:  - Monitor labs   - Monitor I/O and WT  - Instruct patient on fluid and nutrition as appropriate  - Assess for signs & symptoms of volume excess or deficit  Outcome: Progressing  Goal: Glucose maintained within target range  Description: INTERVENTIONS:  - Monitor Blood Glucose as ordered  - Assess for signs and symptoms of hyperglycemia and hypoglycemia  - Administer ordered medications to maintain glucose within target range  - Assess nutritional intake and initiate nutrition service referral as needed  Outcome: Progressing     Problem: MUSCULOSKELETAL -  ADULT  Goal: Maintain or return mobility to safest level of function  Description: INTERVENTIONS:  - Assess patient's ability to carry out ADLs; assess patient's baseline for ADL function and identify physical deficits which impact ability to perform ADLs (bathing, care of mouth/teeth, toileting, grooming, dressing, etc.)  - Assess/evaluate cause of self-care deficits   - Assess range of motion  - Assess patient's mobility  - Assess patient's need for assistive devices and provide as appropriate  - Encourage maximum independence but intervene and supervise when necessary  - Involve family in performance of ADLs  - Assess for home care needs following discharge   - Consider OT consult to assist with ADL evaluation and planning for discharge  - Provide patient education as appropriate  Outcome: Progressing  Goal: Maintain proper alignment of affected body part  Description: INTERVENTIONS:  - Support, maintain and protect limb and body alignment  - Provide patient/ family with appropriate education  Outcome: Progressing

## 2024-06-21 NOTE — CASE MANAGEMENT
Case Management Discharge Planning Note    Patient name Joaquin Frankel  Location [address] [place] [address]/[address] [place] [address]-* MRN [id number]  : 1980 Date 2024       Current Admission Date: 6/3/2024  Current Admission Diagnosis:Acute renal failure (ARF) ([facility])   Patient Active Problem List    Diagnosis Date Noted Date Diagnosed    Wound of abdomen 2024     Volume overload 2024     Surgical follow-up care 2024     Urinary problem in male 2024     Noncompliance with diet and medication regimen 2024     Epidermoid cyst of skin of scalp 2024     Stopped smoking with greater than 20 pack year history 2024     CKD (chronic kidney disease) stage 4, GFR 15-29 ml/min ([facility]) 2024     ANGY (obstructive sleep apnea) 2024     Acute respiratory failure with hypoxia ([facility]) 10/07/2023     Long term (current) use of immunomodulator 2023     Chronic acquired lymphedema 06/15/2022     History of coronary angioplasty with insertion of stent 2022     Anemia of chronic disease 2022     History of hidradenitis suppurativa 2022     Nocturnal hypoxia 2021     Cellulitis of left lower extremity 2021     Morbid obesity ([facility]) 03/10/2021     Hyponatremia 2021     Acute renal failure (ARF) ([facility]) 2021     Diabetes ([facility]) 2018     Other hyperlipidemia 2017     Hypertension 2016     Coronary artery disease 2014       LOS (days): 18  Geometric Mean LOS (GMLOS) (days): 4.4  Days to GMLOS:-13.8     OBJECTIVE:  Risk of Unplanned Readmission Score: 33.48       Current admission status: Inpatient   Preferred Pharmacy:   [id number] - [place], [place] - [address] [facility] [address]  [address] [address]  Phone: [phone] Fax: [phone]    [facility] [place] - [address] [facility] [address] [facility] [address]  [address] [facility] [address]  [facility] [address]  Phone: [phone] Fax:  [phone]    Primary Care Provider: Hany Mcfarland,     Primary Insurance: BLUE CROSS  Secondary Insurance:     DISCHARGE DETAILS:    Discharge planning discussed with:: Patient  Freedom of Choice: Yes    Comments - Freedom of Choice: Patient is now planning to return to work within a short period after discharge and will now need a third shift dialysis chair time.  YEVGENIY re-opened referral and patient has been accepted by [facility] for a 1530 chair time.  YEVGENIY notified liaison Aixa Gonzalez at [facility] that patient will need to switch providers due to his work schedule and will not be starting at [facility].  Dialysis information was placed on patient's AVS and schedule letter was requested from [facility].      Patient asked about additional insurance options as he may be out of work for a short time and SW provided a copy of the [place] [facility] application.  Plan at this time is for patient to be discharged tomorrow after dialysis.       Were Treatment Team discharge recommendations reviewed with patient/caregiver?: Yes  Did patient/caregiver verbalize understanding of patient care needs?: Yes  Were patient/caregiver advised of the risks associated with not following Treatment Team discharge recommendations?: Yes    Requested Home Health Care         Is the patient interested in HHC at discharge?: No    DME Referral Provided  Referral made for DME?: No    Other Referral/Resources/Interventions Provided:  Interventions: Dialysis    Would you like to participate in our [facility] service program?  : No - Declined    Treatment Team Recommendation: Home  Discharge Destination Plan:: Home  Transport at Discharge : Self, Automobile

## 2024-06-21 NOTE — PLAN OF CARE
Problem: Prexisting or High Potential for Compromised Skin Integrity  Goal: Skin integrity is maintained or improved  Description: INTERVENTIONS:  - Identify patients at risk for skin breakdown  - Assess and monitor skin integrity  - Assess and monitor nutrition and hydration status  - Monitor labs   - Assess for incontinence   - Turn and reposition patient  - Assist with mobility/ambulation  - Relieve pressure over bony prominences  - Avoid friction and shearing  - Provide appropriate hygiene as needed including keeping skin clean and dry  - Evaluate need for skin moisturizer/barrier cream  - Collaborate with interdisciplinary team   - Patient/family teaching  - Consider wound care consult   Outcome: Progressing     Problem: PAIN - ADULT  Goal: Verbalizes/displays adequate comfort level or baseline comfort level  Description: Interventions:  - Encourage patient to monitor pain and request assistance  - Assess pain using appropriate pain scale  - Administer analgesics based on type and severity of pain and evaluate response  - Implement non-pharmacological measures as appropriate and evaluate response  - Consider cultural and social influences on pain and pain management  - Notify physician/advanced practitioner if interventions unsuccessful or patient reports new pain  Outcome: Progressing     Problem: SAFETY ADULT  Goal: Patient will remain free of falls  Description: INTERVENTIONS:  - Educate patient/family on patient safety including physical limitations  - Instruct patient to call for assistance with activity   - Consult OT/PT to assist with strengthening/mobility   - Keep Call bell within reach  - Keep bed low and locked with side rails adjusted as appropriate  - Keep care items and personal belongings within reach  - Initiate and maintain comfort rounds  - Make Fall Risk Sign visible to staff  - Apply yellow socks and bracelet for high fall risk patients  - Consider moving patient to room near nurses  station  Outcome: Progressing     Problem: RESPIRATORY - ADULT  Goal: Achieves optimal ventilation and oxygenation  Description: INTERVENTIONS:  - Assess for changes in respiratory status  - Assess for changes in mentation and behavior  - Position to facilitate oxygenation and minimize respiratory effort  - Oxygen administered by appropriate delivery if ordered  - Initiate smoking cessation education as indicated  - Encourage broncho-pulmonary hygiene including cough, deep breathe, Incentive Spirometry  - Assess the need for suctioning and aspirate as needed  - Assess and instruct to report SOB or any respiratory difficulty  - Respiratory Therapy support as indicated  Outcome: Progressing     Problem: SKIN/TISSUE INTEGRITY - ADULT  Goal: Incision(s), wounds(s) or drain site(s) healing without S/S of infection  Description: INTERVENTIONS  - Assess and document dressing, incision, wound bed, drain sites and surrounding tissue  - Provide patient and family education  - Perform skin care/dressing changes  Outcome: Progressing     Problem: METABOLIC, FLUID AND ELECTROLYTES - ADULT  Goal: Electrolytes maintained within normal limits  Description: INTERVENTIONS:  - Monitor labs and assess patient for signs and symptoms of electrolyte imbalances  - Administer electrolyte replacement as ordered  - Monitor response to electrolyte replacements, including repeat lab results as appropriate  - Instruct patient on fluid and nutrition as appropriate  Outcome: Progressing

## 2024-06-21 NOTE — CASE MANAGEMENT
Case Management Progress Note    Patient name Joaquin Frankel  Location [address] [place] [address]/[address] [place] [address]-* MRN [id number]  : 1980 Date 2024       LOS (days): 18  Geometric Mean LOS (GMLOS) (days): 4.4  Days to GMLOS:-13.9        OBJECTIVE:      Current admission status: Inpatient  Preferred Pharmacy:   [id number] - [place], [place] - [facility] [address]  Phone: [phone] Fax: [phone]    [facility] - [place], [place] - [address] [facility] [facility] [address]  [address] [facility], [address] [address]  Phone: [phone] Fax: [phone]    Primary Care Provider: Hany Mcfarland DO    Primary Insurance: BLUE CROSS  Secondary Insurance:     PROGRESS NOTE:    Schedule letter received via fax from [facility] and given to patient.  Updated clinicals and treatment flowsheets uploaded to [facility] referral.

## 2024-06-21 NOTE — PROGRESS NOTES
[facility]  Progress Note  Name: Joaquin Frankel I  MRN: [id number]  Unit/Bed#: [address] [address] I Date of Admission: 6/3/2024   Date of Service: 6/21/2024 I Hospital Day: 18    Assessment & Plan   * Acute renal failure (ARF) ([facility])  Assessment & Plan  POA Cr 6.40 >11.36 > 9.46    BL CR 1.3-1.4  Initiated on HD on 6/7  UA: Hematuria, proteinuria.  UA CR - 5280  Serologies: ANCA titer was negative, anti-GBM was negative.  JOSE ROBERTO/anti-double-stranded DNA was negative.  Kappa/lambda ratio was within normal limits.  Serum and urine immunofixation showed IgG kappa monoclonal bands.  Complement C3 and C4 were normal.  Beta-2 microglobulin was elevated.  CT: No evidence of hydronephrosis  D/W recs nephrology:  Suspect MGRS  HD TTS schedule.  Plan for next dialysis today> OP HD  At this time no evidence of renal recovery.  Continue dialysis schedule.  DC once established stable from renal standpoint for discharge. TTS schedule  Initiated Demadex  Follow-up bone marrow biopsies/renal biopsy      SIRS (systemic inflammatory response syndrome) ([facility])  Assessment & Plan  CT left lower extremity-extensive subcutaneous edema and skin thickening  CT CAP-without any acute abnormality  BCx negative, MRSA surveillance negative  Noted to left lower extremity cellulitis superimposed on lymphedema, now improved  Source likely recurrent draining abscess under the pannus on 6/13.    Cx: Of beta-hemolytic Streptococcus group C  S/P I&D of the pannus by surgery on 6/13.    Continue IV cefazolin post HD, > 6/18  Negative venous Doppler    Hyponatremia  Assessment & Plan  2/2 Vollume overload  Continue on HD  D/W nephrology recs:  Fluid restrict  HD    Wound of abdomen  Assessment & Plan  Seen by wound care, recommended general surgery consultation  Appreciate surgery input  Continue local wound care per wound care nurse  Follow-up in the outpatient wound center    Acute respiratory failure with hypoxia ([facility])  Assessment &  Plan  Noted to have worsening hypoxia during hospitalization requiring MFNC  Likely multifactorial including volume overload, suspected underlying ANGY via OHS.  CT chest with evidence of bibasilar effusion with compressive atelectasis  Continue ultrafiltration as tolerated  Continue supplemental oxygen as needed  Incentive spirometry  Patient states he has an upcoming appointment with a sleep specialist and ultimately will undergo a sleep study as an outpatient.  He has been told he has overnight hypoxia.  Overnight pulse ox      Chronic acquired lymphedema  Assessment & Plan  Patient developed LLE lymphedema after left lower extremity surgical procedure several years ago.  Patient complaining of increasing pain in the left lower extremity likely secondary to volume overload.  Left lower extremity venous Doppler: Negative  Prior referrals ambulatory referral to lymphedema clinic.    Anemia of chronic disease  Assessment & Plan  Patient has anemia of chronic disease likely secondary to underlying nephropathy,?  Myeloma.      He did have some episodes of hematuria.  No further episodes  No further overt bleeding  Hemoglobin gradually down trended to 6.7 g/dL, status post 1 PRBC on 6/11.  Subsequently stable  B12 310, continue supplementation  Iron panel shows iron deficiency.  Status post IV venofer 200mg every other day x 2  Continue to monitor hemoglobin closely and transfuse for hemoglobin less than 7.0 or drastic drop in hemoglobin.    Morbid obesity (HCC)  Assessment & Plan  With BMI of 64.42  Encourage lifestyle modification on discharge    Diabetes (HCC)  Assessment & Plan  Lab Results   Component Value Date    HGBA1C 5.7 (H) 06/09/2024     Recent Labs     06/19/24 2011 06/20/24  0716 06/20/24  1108 06/20/24  1602   POCGLU 116 87 102 164*     Blood Sugar Average: Last 72 hrs:  (P) [id number]    Amaryl held during his hospitalization.   Acceptable, continue to monitor on current regimen  Continue  diabetic diet.      Hypertension  Assessment & Plan  Held amlodipine and carvedilol for low blood pressure initially  Requiring midodrine predialysis as needed  Blood pressure stable/improving at present  Discontinued Bumex as patient remains anuric  Started Toprol-XL 25 mg daily  Started on torsemide 40 mg daily per nephrology  Monitor intake output, daily weight  Monitor blood pressure    Coronary artery disease  Assessment & Plan  Patient with a history of coronary artery disease with multivessel disease status post stents.  Cardiology following, input appreciated  Held aspirin initially for biopsy, subsequently resumed.  Discussed with nephrology, holding aspirin again in case patient requires kidney biopsy  Continue on Toprol-XL  Continue statin           VTE Pharmacologic Prophylaxis: VTE Score: 7 Moderate Risk (Score 3-4) - Pharmacological DVT Prophylaxis Ordered: heparin.    Mobility:   Basic Mobility Inpatient Raw Score: 23  JH-HLM Goal: 7: Walk 25 feet or more  JH-HLM Achieved: 6: Walk 10 steps or more  JH-HLM Goal NOT achieved. Continue with multidisciplinary rounding and encourage appropriate mobility to improve upon JH-HLM goals.    Patient Centered Rounds: I evaluated the patient without nursing staff present due to alternate patient care responsibilities    Discussions with Specialists or Other Care Team Provider: Nephrology    Education and Discussions with Family / Patient:  Spoke with patient.     Total Time Spent on Date of Encounter in care of patient: 40 mins. This time was spent on one or more of the following: performing physical exam; counseling and coordination of care; obtaining or reviewing history; documenting in the medical record; reviewing/ordering tests, medications or procedures; communicating with other healthcare professionals and discussing with patient's family/caregivers.    Current Length of Stay: 18 day(s)  Current Patient Status: Inpatient   Certification Statement: The  patient will continue to require additional inpatient hospital stay due to clinical course and specialist recs  Discharge Plan: Anticipate discharge in 24-48 hrs to discharge location to be determined pending rehab evaluations.    Code Status: Level 1 - Full Code    Subjective:   Patient seen and examined at bedside while resting, reported no fever no chills, no chest pain, reported becoming needing some O2 overnight, reported supposed to get out of sleep study outpatient.    Objective:     Vitals:   Temp (24hrs), Av.2 °F (36.8 °C), Min:97.6 °F (36.4 °C), Max:98.6 °F (37 °C)    Temp:  [97.6 °F (36.4 °C)-98.6 °F (37 °C)] 97.6 °F (36.4 °C)  HR:  [60-66] 63  Resp:  [16-19] 19  BP: (112-155)/(43-76) 139/67  SpO2:  [92 %-98 %] 98 %  Body mass index is 63.12 kg/m².     Input and Output Summary (last 24 hours):     Intake/Output Summary (Last 24 hours) at 2024 0835  Last data filed at 2024 2100  Gross per 24 hour   Intake 1220 ml   Output 4000 ml   Net -2780 ml       Physical Exam:   Physical Exam  Vitals and nursing note reviewed.   Constitutional:       General: He is not in acute distress.     Appearance: He is well-developed.   HENT:      Head: Normocephalic and atraumatic.   Eyes:      Conjunctiva/sclera: Conjunctivae normal.   Cardiovascular:      Rate and Rhythm: Normal rate and regular rhythm.      Heart sounds: No murmur heard.  Pulmonary:      Effort: Pulmonary effort is normal. No respiratory distress.      Breath sounds: Normal breath sounds.   Abdominal:      Palpations: Abdomen is soft.      Tenderness: There is no abdominal tenderness.   Musculoskeletal:         General: No swelling.      Cervical back: Neck supple.      Right lower leg: Edema present.      Left lower leg: Edema (3) present.   Skin:     General: Skin is warm and dry.      Capillary Refill: Capillary refill takes less than 2 seconds.   Neurological:      Mental Status: He is alert and oriented to person, place, and time.    Psychiatric:         Mood and Affect: Mood normal.          Additional Data:     Labs:  Results from last 7 days   Lab Units 06/21/24  0531 06/20/24  0538 06/19/24  0301   WBC Thousand/uL 8.30   < > 12.82*   HEMOGLOBIN g/dL 6.6*   < > 8.0*   HEMATOCRIT % 21.0*   < > 25.2*   PLATELETS Thousands/uL 340   < > 405*   BANDS PCT %  --   --  1   SEGS PCT % 70   < >  --    LYMPHO PCT % 14   < > 14   MONO PCT % 9   < > 3*   EOS PCT % 5   < > 3    < > = values in this interval not displayed.     Results from last 7 days   Lab Units 06/21/24  0531   SODIUM mmol/L 128*   POTASSIUM mmol/L 4.8   CHLORIDE mmol/L 92*   CO2 mmol/L 26   BUN mg/dL 44*   CREATININE mg/dL 9.46*   ANION GAP mmol/L 10   CALCIUM mg/dL 8.2*   GLUCOSE RANDOM mg/dL 86           Results from last 7 days   Lab Units 06/21/24  0725 06/20/24  2057 06/20/24  1602 06/20/24  1108 06/20/24  0716 06/19/24 2011 06/19/24  1621 06/19/24  1121 06/19/24  0719 06/18/24 2000 06/18/24  1617 06/18/24  1103   POC GLUCOSE mg/dl 83 109 164* 102 87 116 104 105 107 158* 119 125               Lines/Drains:  Invasive Devices       Peripheral Intravenous Line  Duration             Peripheral IV 06/18/24 Dorsal (posterior);Right Hand 3 days              Hemodialysis Catheter  Duration             HD Permanent Double Catheter 6 days                      Imaging: Reviewed radiology reports from this admission including: Venous duplex    Recent Cultures (last 7 days):           Last 24 Hours Medication List:   Current Facility-Administered Medications   Medication Dose Route Frequency Provider Last Rate    atorvastatin  80 mg Oral Daily Holly Bush Shoemaker, CRNP      Cholecalciferol  5,000 Units Oral Daily Holly Bush Shoemaker, CRNP      cyanocobalamin  1,000 mcg Oral Daily Holly Bush Shoemaker, CRNP      docusate sodium  100 mg Oral BID Holly Bush Shoemaker, CRNP      gabapentin  300 mg Oral BID Holly Bush Shoemaker, CRNP      heparin (porcine)  5,000 Units  Subcutaneous Q8H SHAZIA Holly Rachelle Sifuentes, CRLOKI      HYDROmorphone  0.5 mg Intravenous Q4H PRN Dennis Waterman MD      insulin lispro  1-6 Units Subcutaneous HS Mercer Rachelle Sifuentes, CRNP      insulin lispro  2-12 Units Subcutaneous TID AC Mercer Rachelle Sifuentes, CRNP      lidocaine  1 patch Topical Daily Mercer Rachelle Sifuentes, CRNP      metoprolol succinate  25 mg Oral Daily Jose De Jesus Cardozo PA-C      midodrine  5 mg Oral Before Dialysis Mercer Rachelle Sifuentes, TOYANP      nystatin  1 Application Topical BID Mercer Rachelle Sifuentes, CRLOKI      ondansetron  4 mg Intravenous Q6H PRN Holly Rachelle Sifuentes, PETRA      oxyCODONE  5 mg Oral Q6H PRN Dennis Waterman MD      polyethylene glycol  17 g Oral Daily PRN Mercer Rachelle Sifuentes, TOYANP      senna  1 tablet Oral HS [facility], CRNP      sodium hypochlorite  1 Application Irrigation Daily Mercer Rachelle Sifuentes, CRNP      torsemide  40 mg Oral Daily Kylie Kendall MD          Today, Patient Was Seen By: Alyssa Levy MD    **Please Note: This note may have been constructed using a voice recognition system.**

## 2024-06-21 NOTE — PLAN OF CARE
Problem: OCCUPATIONAL THERAPY ADULT  Goal: Performs self-care activities at highest level of function for planned discharge setting.  See evaluation for individualized goals.  Description: Treatment Interventions: ADL retraining, Endurance training, Equipment evaluation/education, Patient/family training, Functional transfer training, Compensatory technique education, Continued evaluation, Energy conservation, Activityengagement          See flowsheet documentation for full assessment, interventions and recommendations.   Outcome: Progressing  Note: Limitation: Decreased ADL status, Decreased endurance, Decreased self-care trans, Decreased high-level ADLs (impaired activity tolerance, pain, forward functional reach, standing tolerance, balance, LE strength, LE edema)     Assessment: Pt seen for skilled OT tx session day 1 focusing on activity engagement, challenging activity tolerance and pt education. Pt agreeable to participate and required less physical assistance to complete bed mobility, sit <> stand and functional mobility using cane. Therapist educated pt on EC tech and increasing activity / engagement throughout the day. Therapist educated pt on use of LHAE to max I w/ ADLs and improve engagement. Continue to recommend level III rehab resource intensity when medically stable for discharge from acute care. Will continue to follow     Rehab Resource Intensity Level, OT: III (Minimum Resource Intensity)

## 2024-06-21 NOTE — OCCUPATIONAL THERAPY NOTE
Occupational Therapy Progress Note     Patient Name: Joaquin Frankel  Today's Date: 6/21/2024  Problem List  Principal Problem:    Acute renal failure (ARF) (HCC)  Active Problems:    Coronary artery disease    Hypertension    Diabetes (HCC)    Hyponatremia    Morbid obesity (HCC)    Anemia of chronic disease    Chronic acquired lymphedema    Acute respiratory failure with hypoxia (HCC)    Wound of abdomen    SIRS (systemic inflammatory response syndrome) (HCC)          06/21/24 1405   OT Last Visit   OT Visit Date 06/21/24  (Friday)   Note Type   Note Type Treatment  (tx session 1032-1888)   Pain Assessment   Pain Assessment Tool 0-10   Pain Score 9   Pain Location/Orientation Location: Chest  (during functional mobility; subsided upon return to bed. RNArden made aware)   Effect of Pain on Daily Activities limits pace and activity tolerance during ADLs   Patient's Stated Pain Goal No pain   Hospital Pain Intervention(s) Repositioned;Ambulation/increased activity;Emotional support   Restrictions/Precautions   Weight Bearing Precautions Per Order No   Other Precautions Fall Risk;Pain;Fluid restriction  (Abdiel on room air; 1800ML fluid restriction)   Lifestyle   Autonomy Pt reports I w/ ADLs/ IADLs at baseline, working full time   Reciprocal Relationships Pt reports living w/ his 22 yo son   Service to Others Pt reports working full time as ; requires heavy lifting   Intrinsic Gratification Pt reports enjoying video games   ADL   Where Assessed Edge of bed   Eating Assistance 7  Independent   Grooming Assistance 6  Modified Independent   Grooming Deficit Increased time to complete   UB Dressing Assistance 6  Modified independent   UB Dressing Deficit Setup;Increased time to complete   LB Dressing Comments Therapist educated pt on use of LHAE as needed to max I and minimize burden of care. Pt reports that he has his son and thinks that he will be okay. Pt added that he has not throught much about it.  "Pt declined trial using LHAE this afternoon. Will continue to reinforce / educate   Bed Mobility   Supine to Sit 6  Modified independent   Additional items HOB elevated;Assist x 1  (to pt's L)   Sit to Supine 6  Modified independent   Additional items HOB elevated;Increased time required;Assist x 1   Additional Comments Pt supine HOB elevated upon arrival and at end of session w/ needs met, call bell in reach. Wound care RN present   Transfers   Sit to Stand 6  Modified independent   Additional items Increased time required   Stand to Sit 6  Modified independent   Additional items Increased time required   Functional Mobility   Functional Mobility 5  Supervision   Additional Comments engaged in functional mobility > than household distances (approx 80'). Pt reports 9/10 chest pain. Standing rest break and then returned to room. O2 sats 96% on room air and HR 95 upon return. RN made aware. /72 upon return to supine.   Additional items SPC   Subjective   Subjective \"I think they need to work me up for something. I feel like there is a blockage\"   Cognition   Overall Cognitive Status WFL   Arousal/Participation Alert;Cooperative   Attention Within functional limits   Orientation Level Oriented X4   Memory Within functional limits   Following Commands Follows all commands and directions without difficulty   Comments Identified pt by full name and wristband. Alert, oriented and recalled this therapist from previous session   Activity Tolerance   Activity Tolerance Patient limited by fatigue;Patient limited by pain   Medical Staff Made Aware spoke w/ RNArden and wound care RN   Assessment   Assessment Pt seen for skilled OT tx session day 1 focusing on activity engagement, challenging activity tolerance and pt education. Pt agreeable to participate and required less physical assistance to complete bed mobility, sit <> stand and functional mobility using cane. Therapist educated pt on EC tech and increasing " activity / engagement throughout the day. Therapist educated pt on use of LHAE to max I w/ ADLs and improve engagement. Continue to recommend level III rehab resource intensity when medically stable for discharge from acute care. Will continue to follow   Plan   Treatment Interventions ADL retraining;Functional transfer training;Endurance training;Patient/family training;Equipment evaluation/education;Compensatory technique education;Continued evaluation;Energy conservation;Activityengagement   Goal Expiration Date 06/26/24   OT Treatment Day 1  (Friday 6/21/24)   OT Frequency 3-5x/wk   Discharge Recommendation   Rehab Resource Intensity Level, OT III (Minimum Resource Intensity)   Equipment Recommended   (may benefit from LHAE)   -[facility] Daily Activity Inpatient   Lower Body Dressing 2   Bathing 3   Toileting 4   Upper Body Dressing 4   Grooming 4   Eating 4   Daily Activity Raw Score 21   Daily Activity Standardized Score (Calc for Raw Score >=11) 44.27   -PAC Applied Cognition Inpatient   Following a Speech/Presentation 4   Understanding Ordinary Conversation 4   Taking Medications 4   Remembering Where Things Are Placed or Put Away 4   Remembering List of 4-5 Errands 4   Taking Care of Complicated Tasks 4   Applied Cognition Raw Score 24   Applied Cognition Standardized Score 62.21   Barthel Index   Feeding 10   Bathing 0   Grooming Score 5   Dressing Score 5   Bladder Score 10   Bowels Score 10   Toilet Use Score 10   Transfers (Bed/Chair) Score 15   Mobility (Level Surface) Score 10   Stairs Score 5   Barthel Index Score 80   End of Consult   Education Provided Yes   Patient Position at End of Consult Supine;All needs within reach   Nurse Communication Nurse aware of consult   Licensure   [place] License Number  Katie Pathak OTR/L [id number]         The patient's raw score on the AM-PAC Daily Activity Inpatient Short Form is 21. A raw score of greater than or equal to 19 suggests the patient may benefit  from discharge to home. Please refer to the recommendation of the Occupational Therapist for safe discharge planning.      Katie Pathak, OTR/L  [id number]  [id number]

## 2024-06-22 ENCOUNTER — APPOINTMENT (INPATIENT)
Dept: DIALYSIS | Facility: HOSPITAL | Age: 44
DRG: 673 | End: 2024-06-22
Attending: INTERNAL MEDICINE
Payer: COMMERCIAL

## 2024-06-22 LAB
ANION GAP SERPL CALCULATED.3IONS-SCNC: 10 MMOL/L (ref 4–13)
BASOPHILS # BLD AUTO: 0.05 THOUSANDS/ÂΜL (ref 0–0.1)
BASOPHILS NFR BLD AUTO: 1 % (ref 0–1)
BUN SERPL-MCNC: 54 MG/DL (ref 5–25)
CALCIUM SERPL-MCNC: 8.7 MG/DL (ref 8.4–10.2)
CHLORIDE SERPL-SCNC: 91 MMOL/L (ref 96–108)
CO2 SERPL-SCNC: 26 MMOL/L (ref 21–32)
CREAT SERPL-MCNC: 11.31 MG/DL (ref 0.6–1.3)
EOSINOPHIL # BLD AUTO: 0.38 THOUSAND/ÂΜL (ref 0–0.61)
EOSINOPHIL NFR BLD AUTO: 5 % (ref 0–6)
ERYTHROCYTE [DISTWIDTH] IN BLOOD BY AUTOMATED COUNT: 14.1 % (ref 11.6–15.1)
GFR SERPL CREATININE-BSD FRML MDRD: 4 ML/MIN/1.73SQ M
GLUCOSE SERPL-MCNC: 112 MG/DL (ref 65–140)
GLUCOSE SERPL-MCNC: 78 MG/DL (ref 65–140)
GLUCOSE SERPL-MCNC: 86 MG/DL (ref 65–140)
GLUCOSE SERPL-MCNC: 90 MG/DL (ref 65–140)
GLUCOSE SERPL-MCNC: 98 MG/DL (ref 65–140)
HCT VFR BLD AUTO: 21.8 % (ref 36.5–49.3)
HGB BLD-MCNC: 6.8 G/DL (ref 12–17)
IMM GRANULOCYTES # BLD AUTO: 0.04 THOUSAND/UL (ref 0–0.2)
IMM GRANULOCYTES NFR BLD AUTO: 1 % (ref 0–2)
LYMPHOCYTES # BLD AUTO: 0.99 THOUSANDS/ÂΜL (ref 0.6–4.47)
LYMPHOCYTES NFR BLD AUTO: 13 % (ref 14–44)
MCH RBC QN AUTO: 28.2 PG (ref 26.8–34.3)
MCHC RBC AUTO-ENTMCNC: 31.2 G/DL (ref 31.4–37.4)
MCV RBC AUTO: 91 FL (ref 82–98)
MONOCYTES # BLD AUTO: 0.71 THOUSAND/ÂΜL (ref 0.17–1.22)
MONOCYTES NFR BLD AUTO: 9 % (ref 4–12)
NEUTROPHILS # BLD AUTO: 5.65 THOUSANDS/ÂΜL (ref 1.85–7.62)
NEUTS SEG NFR BLD AUTO: 71 % (ref 43–75)
NRBC BLD AUTO-RTO: 0 /100 WBCS
PLATELET # BLD AUTO: 310 THOUSANDS/UL (ref 149–390)
PMV BLD AUTO: 8.3 FL (ref 8.9–12.7)
POTASSIUM SERPL-SCNC: 5 MMOL/L (ref 3.5–5.3)
RBC # BLD AUTO: 2.41 MILLION/UL (ref 3.88–5.62)
SODIUM SERPL-SCNC: 127 MMOL/L (ref 135–147)
WBC # BLD AUTO: 7.82 THOUSAND/UL (ref 4.31–10.16)

## 2024-06-22 PROCEDURE — 82948 REAGENT STRIP/BLOOD GLUCOSE: CPT

## 2024-06-22 PROCEDURE — 85025 COMPLETE CBC W/AUTO DIFF WBC: CPT | Performed by: STUDENT IN AN ORGANIZED HEALTH CARE EDUCATION/TRAINING PROGRAM

## 2024-06-22 PROCEDURE — 80048 BASIC METABOLIC PNL TOTAL CA: CPT | Performed by: STUDENT IN AN ORGANIZED HEALTH CARE EDUCATION/TRAINING PROGRAM

## 2024-06-22 PROCEDURE — 99233 SBSQ HOSP IP/OBS HIGH 50: CPT | Performed by: STUDENT IN AN ORGANIZED HEALTH CARE EDUCATION/TRAINING PROGRAM

## 2024-06-22 PROCEDURE — 99232 SBSQ HOSP IP/OBS MODERATE 35: CPT | Performed by: INTERNAL MEDICINE

## 2024-06-22 RX ADMIN — HEPARIN SODIUM 5000 UNITS: 5000 INJECTION, SOLUTION INTRAVENOUS; SUBCUTANEOUS at 05:09

## 2024-06-22 RX ADMIN — OXYCODONE HYDROCHLORIDE 5 MG: 5 TABLET ORAL at 00:53

## 2024-06-22 RX ADMIN — ATORVASTATIN CALCIUM 80 MG: 80 TABLET, FILM COATED ORAL at 08:11

## 2024-06-22 RX ADMIN — TORSEMIDE 40 MG: 20 TABLET ORAL at 08:11

## 2024-06-22 RX ADMIN — HEPARIN SODIUM 5000 UNITS: 5000 INJECTION, SOLUTION INTRAVENOUS; SUBCUTANEOUS at 22:04

## 2024-06-22 RX ADMIN — METOPROLOL SUCCINATE 25 MG: 25 TABLET, EXTENDED RELEASE ORAL at 08:11

## 2024-06-22 RX ADMIN — Medication 5000 UNITS: at 08:11

## 2024-06-22 RX ADMIN — NYSTATIN 1 APPLICATION: 100000 POWDER TOPICAL at 08:11

## 2024-06-22 RX ADMIN — OXYCODONE HYDROCHLORIDE 5 MG: 5 TABLET ORAL at 08:10

## 2024-06-22 RX ADMIN — HYDROMORPHONE HYDROCHLORIDE 0.5 MG: 1 INJECTION, SOLUTION INTRAMUSCULAR; INTRAVENOUS; SUBCUTANEOUS at 02:35

## 2024-06-22 RX ADMIN — HYDROMORPHONE HYDROCHLORIDE 0.5 MG: 1 INJECTION, SOLUTION INTRAMUSCULAR; INTRAVENOUS; SUBCUTANEOUS at 22:04

## 2024-06-22 RX ADMIN — HYDROMORPHONE HYDROCHLORIDE 0.5 MG: 1 INJECTION, SOLUTION INTRAMUSCULAR; INTRAVENOUS; SUBCUTANEOUS at 18:44

## 2024-06-22 RX ADMIN — NYSTATIN 1 APPLICATION: 100000 POWDER TOPICAL at 17:12

## 2024-06-22 RX ADMIN — ONDANSETRON 4 MG: 2 INJECTION INTRAMUSCULAR; INTRAVENOUS at 10:15

## 2024-06-22 RX ADMIN — HYDROMORPHONE HYDROCHLORIDE 0.5 MG: 1 INJECTION, SOLUTION INTRAMUSCULAR; INTRAVENOUS; SUBCUTANEOUS at 12:49

## 2024-06-22 RX ADMIN — CYANOCOBALAMIN TAB 500 MCG 1000 MCG: 500 TAB at 08:11

## 2024-06-22 RX ADMIN — OXYCODONE HYDROCHLORIDE 5 MG: 5 TABLET ORAL at 16:29

## 2024-06-22 RX ADMIN — HYDROMORPHONE HYDROCHLORIDE 0.5 MG: 1 INJECTION, SOLUTION INTRAMUSCULAR; INTRAVENOUS; SUBCUTANEOUS at 09:53

## 2024-06-22 NOTE — WOUND OSTOMY CARE
Progress Note - Wound   Joaquin Frankel 44 y.o. male MRN: [id number]  Unit/Bed#: [address] [place] [address] Encounter: [id number]              Assessment:   This is a follow up visit for this 44 year old male patient admitted on 6/3/24 with acute kidney injury. He has a history of DM, HTN, CAD, hidradenitis suppurativa and morbid obesity. Patient was completing occupational therapy after ambulating in [place] at time of visit and complained of chest pain - Primary RN notified. Patient stated relief once back in bed supine with legs elevated - vital signs stable. Patient agreeable to have wound visit done.    Assessment Findings:  1-Abdominal wounds healed - orders changed.  2-Left medial thigh full thickness wound with yellow slough found to be left open to air. Orders for wound care changed.    Plan:   Skin care plans:  1-Cleanse healed abdominal wound sites with foaming cleanser and pat dry. Apply ABD pad and gentle paper tape per patient request. Change daily and prn soilage/dislodgement.  2-Cleanse wound to left medial thigh with NSS & pat dry. Apply thin layer of Normlgel. Cover with gauze or ABD and gentle paper tape. Change daily & prn soilage/dislodgement.  3-Cleanse fungal rash to right groin with foaming cleanser & pat dry. Apply light dusting of Nystatin powder 2x/day and gently remove excess to prevent caking.  4-Hydraguard to bilateral sacrum, buttock and heels BID and PRN  5-Float heels on 2 pillows to offload pressure so heels are not in contact with mattress or pillows.  6-Ehob pressure redistribution cushion in chair when out of bed. Avoid prolonged sitting.  7-Moisturize skin daily with skin nourishing cream.  8-Turn/reposition q2h or when medically stable for pressure re-distribution on skin.      Discussed plan of care/recommendations with Arden QUINN.  This wound RN unable to complete this documentation on 6/21 due to unable to access EAD.     Wound care will follow along with patient throughout admission,  please call or tiger text with questions and concerns.     Recommendations written as orders.  Myra Corea MSN, RN, CWON

## 2024-06-22 NOTE — PLAN OF CARE
Target UF Goal 4 L as tolerated. Patient dialyzing for  3.5 hours  on 2 K bath for serum K of 5 per protocol. Treatment plan reviewed with Nephrology.   Problem: METABOLIC, FLUID AND ELECTROLYTES - ADULT  Goal: Electrolytes maintained within normal limits  Description: INTERVENTIONS:  - Monitor labs and assess patient for signs and symptoms of electrolyte imbalances  - Administer electrolyte replacement as ordered  - Monitor response to electrolyte replacements, including repeat lab results as appropriate  - Instruct patient on fluid and nutrition as appropriate  Outcome: Progressing  Goal: Fluid balance maintained  Description: INTERVENTIONS:  - Monitor labs   - Monitor I/O and WT  - Instruct patient on fluid and nutrition as appropriate  - Assess for signs & symptoms of volume excess or deficit  Outcome: Progressing     Post-Dialysis RN Treatment Note    Blood Pressure:  Pre 130/60 mm/Hg  Post 121/58 mmHg   EDW  tbd kg    Weight:  Pre 184 kg   Post 180 kg   Mode of weight measurement: Standing Scale   Volume Removed  4000 ml    Treatment duration 210 minutes    NS given  No    Treatment shortened? No   Medications given during Rx withheld Midodrine due to high BP   Estimated Kt/V  None Reported   Access type: Permacath/TDC   Access Issues: No    Report called to primary nurse   Yes Prabhakar Erickson RN

## 2024-06-22 NOTE — PROGRESS NOTES
[facility]  Progress Note  Name: Joaquin Frankel I  MRN: [id number]  Unit/Bed#: 4 [address] I Date of Admission: 6/3/2024   Date of Service: 6/22/2024 I Hospital Day: 19    Assessment & Plan   * Acute renal failure (ARF) (HCC)  Assessment & Plan  POA Cr 6.40 >11.36 > 9.46> 11.31    BL CR 1.3-1.4  Initiated on HD on 6/7  UA: Hematuria, proteinuria.  UA CR - 5280  Serologies: ANCA titer was negative, anti-GBM was negative.  JOSE ROBERTO/anti-double-stranded DNA was negative.  Kappa/lambda ratio was within normal limits.  Serum and urine immunofixation showed IgG kappa monoclonal bands.  Complement C3 and C4 were normal.  Beta-2 microglobulin was elevated.  CT: No evidence of hydronephrosis  D/W recs nephrology:  Suspect MGRS  HD TTS schedule.  Plan for next dialysis today> OP HD  At this time no evidence of renal recovery.  Continue dialysis schedule.  DC once established stable from renal standpoint for discharge. TTS schedule  Initiated Demadex  Follow-up bone marrow biopsies/renal biopsy      SIRS (systemic inflammatory response syndrome) (HCC)-resolved as of 6/21/2024  Assessment & Plan  CT left lower extremity-extensive subcutaneous edema and skin thickening  CT CAP-without any acute abnormality  BCx negative, MRSA surveillance negative  Noted to left lower extremity cellulitis superimposed on lymphedema, now improved  Source likely recurrent draining abscess under the pannus on 6/13.    Cx: Of beta-hemolytic Streptococcus group C  S/P I&D of the pannus by surgery on 6/13.    Continue IV cefazolin post HD, > 6/18  Negative venous Doppler    Hyponatremia  Assessment & Plan  Na-127  2/2 Vollume overload  Continue on HD  D/W nephrology recs:  Fluid restrict  HD    Wound of abdomen  Assessment & Plan  Seen by wound care, recommended general surgery consultation  Appreciate surgery input  Continue local wound care per wound care nurse  Follow-up in the outpatient wound center    Acute respiratory  failure with hypoxia (HCC)  Assessment & Plan  Noted to have worsening hypoxia during hospitalization requiring MFNC  Likely multifactorial including volume overload, suspected underlying ANGY via OHS.  CT chest with evidence of bibasilar effusion with compressive atelectasis  Continue ultrafiltration as tolerated  Continue supplemental oxygen as needed  Incentive spirometry  Patient states he has an upcoming appointment with a sleep specialist and ultimately will undergo a sleep study as an outpatient.  He has been told he has overnight hypoxia.  Overnight pulse ox      Chronic acquired lymphedema  Assessment & Plan  Patient developed LLE lymphedema after left lower extremity surgical procedure several years ago.  Patient complaining of increasing pain in the left lower extremity likely secondary to volume overload.  Left lower extremity venous Doppler: Negative  Prior referrals ambulatory referral to lymphedema clinic.    Anemia of chronic disease  Assessment & Plan  Patient has anemia of chronic disease likely secondary to underlying nephropathy,?  Myeloma.      He did have some episodes of hematuria.  No further episodes  No further overt bleeding  Hemoglobin gradually down trended to 6.7 g/dL, status post 1 PRBC on 6/11.  Subsequently stable  B12 310, continue supplementation  Iron panel shows iron deficiency.  Status post IV venofer 200mg every other day x 2  Continue to monitor hemoglobin closely and transfuse for hemoglobin less than 7.0 or drastic drop in hemoglobin.    Morbid obesity (HCC)  Assessment & Plan  With BMI of 64.42  Encourage lifestyle modification on discharge    Diabetes (HCC)  Assessment & Plan  Lab Results   Component Value Date    HGBA1C 5.7 (H) 06/09/2024     Recent Labs     06/20/24  1602 06/20/24  2057 06/21/24  0725 06/21/24  1119   POCGLU 164* 109 83 89     Blood Sugar Average: Last 72 hrs:  (P) [id number]    Amaryl held during his hospitalization.   Acceptable, continue to  monitor on current regimen  Continue diabetic diet.      Hypertension  Assessment & Plan  Held amlodipine and carvedilol for low blood pressure initially  Requiring midodrine predialysis as needed  Blood pressure stable/improving at present  Nephro recs following:    Started Toprol-XL 25 mg daily  Started on torsemide 40 mg daily per nephrology  Monitor intake output, daily weight  Monitor blood pressure    Coronary artery disease  Assessment & Plan  Patient with a history of coronary artery disease with multivessel disease status post stents.  Cardiology following, input appreciated  Held aspirin initially for biopsy, subsequently resumed.  Discussed with nephrology, holding aspirin again in case patient requires kidney biopsy  Continue on Toprol-XL  Continue statin           VTE Pharmacologic Prophylaxis: VTE Score: 7 Moderate Risk (Score 3-4) - Pharmacological DVT Prophylaxis Ordered: heparin.    Mobility:   Basic Mobility Inpatient Raw Score: 23  JH-HLM Goal: 7: Walk 25 feet or more  JH-HLM Achieved: 2: Bed activities/Dependent transfer  JH-HLM Goal NOT achieved. Continue with multidisciplinary rounding and encourage appropriate mobility to improve upon JH-HLM goals.    Patient Centered Rounds: I evaluated the patient without nursing staff present due to alternate patient care responsibilities    Discussions with Specialists or Other Care Team Provider: Nephrology    Education and Discussions with Family / Patient:  Spoke with patient.     Total Time Spent on Date of Encounter in care of patient: 45 mins. This time was spent on one or more of the following: performing physical exam; counseling and coordination of care; obtaining or reviewing history; documenting in the medical record; reviewing/ordering tests, medications or procedures; communicating with other healthcare professionals and discussing with patient's family/caregivers.    Current Length of Stay: 19 day(s)  Current Patient Status: Inpatient    Certification Statement: The patient will continue to require additional inpatient hospital stay due to clinical course and specialist recs  Discharge Plan: Anticipate discharge in 24-48 hrs to discharge location to be determined pending rehab evaluations.    Code Status: Level 1 - Full Code    Subjective:   Patient seen and examined at bedside reported using O2 overnight, stated that he had chest pain with and without exertion, that has been intermittent but that is lasting longer than before, reported supposed to have a cardiology NM scan  but was canceled due to being inpatient.  Patient denied any palpitations, nausea, lightheadedness.    Objective:     Vitals:   Temp (24hrs), Av.3 °F (36.8 °C), Min:98.2 °F (36.8 °C), Max:98.4 °F (36.9 °C)    Temp:  [98.2 °F (36.8 °C)-98.4 °F (36.9 °C)] 98.2 °F (36.8 °C)  HR:  [55-70] 57  Resp:  [16-20] 18  BP: (118-137)/(46-66) 118/56  SpO2:  [89 %-100 %] 96 %  Body mass index is 63.43 kg/m².     Input and Output Summary (last 24 hours):     Intake/Output Summary (Last 24 hours) at 2024 1426  Last data filed at 2024 1258  Gross per 24 hour   Intake 200 ml   Output --   Net 200 ml       Physical Exam:   Physical Exam  Vitals and nursing note reviewed.   Constitutional:       General: He is not in acute distress.     Appearance: He is well-developed. He is obese. He is not diaphoretic.   HENT:      Head: Normocephalic and atraumatic.   Eyes:      Conjunctiva/sclera: Conjunctivae normal.   Cardiovascular:      Rate and Rhythm: Normal rate and regular rhythm.      Heart sounds: No murmur heard.  Pulmonary:      Effort: Pulmonary effort is normal. No respiratory distress.      Breath sounds: Normal breath sounds.   Abdominal:      Palpations: Abdomen is soft.      Tenderness: There is no abdominal tenderness.   Musculoskeletal:         General: No swelling.      Cervical back: Neck supple.      Right lower leg: Edema present.      Left lower leg: Edema present.    Skin:     General: Skin is warm and dry.      Capillary Refill: Capillary refill takes less than 2 seconds.   Neurological:      Mental Status: He is alert and oriented to person, place, and time.   Psychiatric:         Mood and Affect: Mood normal.         Behavior: Behavior normal.          Additional Data:     Labs:  Results from last 7 days   Lab Units 06/22/24  0505 06/20/24  0538 06/19/24  0301   WBC Thousand/uL 7.82   < > 12.82*   HEMOGLOBIN g/dL 6.8*   < > 8.0*   HEMATOCRIT % 21.8*   < > 25.2*   PLATELETS Thousands/uL 310   < > 405*   BANDS PCT %  --   --  1   SEGS PCT % 71   < >  --    LYMPHO PCT % 13*   < > 14   MONO PCT % 9   < > 3*   EOS PCT % 5   < > 3    < > = values in this interval not displayed.     Results from last 7 days   Lab Units 06/22/24  0505   SODIUM mmol/L 127*   POTASSIUM mmol/L 5.0   CHLORIDE mmol/L 91*   CO2 mmol/L 26   BUN mg/dL 54*   CREATININE mg/dL 11.31*   ANION GAP mmol/L 10   CALCIUM mg/dL 8.7   GLUCOSE RANDOM mg/dL 86           Results from last 7 days   Lab Units 06/22/24  1131 06/22/24  0704 06/21/24  2025 06/21/24  1601 06/21/24  1119 06/21/24  0725 06/20/24  2057 06/20/24  1602 06/20/24  1108 06/20/24  0716 06/19/24 2011 06/19/24  1621   POC GLUCOSE mg/dl 90 78 96 105 89 83 109 164* 102 87 116 104               Lines/Drains:  Invasive Devices       Peripheral Intravenous Line  Duration             Peripheral IV 06/22/24 Distal;Right;Ventral (anterior) Forearm <1 day              Hemodialysis Catheter  Duration             HD Permanent Double Catheter 7 days                      Imaging: Reviewed radiology reports from this admission including: Venous duplex    Recent Cultures (last 7 days):           Last 24 Hours Medication List:   Current Facility-Administered Medications   Medication Dose Route Frequency Provider Last Rate    atorvastatin  80 mg Oral Daily PETRA Briceño      Cholecalciferol  5,000 Units Oral Daily PETRA Briceño       cyanocobalamin  1,000 mcg Oral Daily [facility], CRNP      docusate sodium  100 mg Oral BID [facility], CRNP      heparin (porcine)  5,000 Units Subcutaneous Q8H [facility] Bear, CRNP      HYDROmorphone  0.5 mg Intravenous Q3H PRN Alyssa Levy MD      insulin lispro  1-6 Units Subcutaneous HS [facility], CRNP      insulin lispro  2-12 Units Subcutaneous TID AC [facility], CRNP      lidocaine  1 patch Topical Daily [facility], CRNP      metoprolol succinate  25 mg Oral Daily Jose De Jesus Cardozo PA-C      midodrine  5 mg Oral Before Dialysis [facility], PETRA      nystatin  1 Application Topical BID [facility], CRNP      ondansetron  4 mg Intravenous Q6H PRN [facility], PETRA      oxyCODONE  5 mg Oral Q6H PRN Dennis Waterman MD      polyethylene glycol  17 g Oral Daily PRN [facility], PETRA      senna  1 tablet Oral HS [facility], CRNP      torsemide  40 mg Oral Daily Kylie Kendall MD          Today, Patient Was Seen By: Alyssa Levy MD    **Please Note: This note may have been constructed using a voice recognition system.**

## 2024-06-22 NOTE — PLAN OF CARE
Problem: Prexisting or High Potential for Compromised Skin Integrity  Goal: Skin integrity is maintained or improved  Description: INTERVENTIONS:  - Identify patients at risk for skin breakdown  - Assess and monitor skin integrity  - Assess and monitor nutrition and hydration status  - Monitor labs   - Assess for incontinence   - Turn and reposition patient  - Assist with mobility/ambulation  - Relieve pressure over bony prominences  - Avoid friction and shearing  - Provide appropriate hygiene as needed including keeping skin clean and dry  - Evaluate need for skin moisturizer/barrier cream  - Collaborate with interdisciplinary team   - Patient/family teaching  - Consider wound care consult   Outcome: Progressing     Problem: PAIN - ADULT  Goal: Verbalizes/displays adequate comfort level or baseline comfort level  Description: Interventions:  - Encourage patient to monitor pain and request assistance  - Assess pain using appropriate pain scale  - Administer analgesics based on type and severity of pain and evaluate response  - Implement non-pharmacological measures as appropriate and evaluate response  - Consider cultural and social influences on pain and pain management  - Notify physician/advanced practitioner if interventions unsuccessful or patient reports new pain  Outcome: Progressing     Problem: CARDIOVASCULAR - ADULT  Goal: Maintains optimal cardiac output and hemodynamic stability  Description: INTERVENTIONS:  - Monitor I/O, vital signs and rhythm  - Monitor for S/S and trends of decreased cardiac output  - Administer and titrate ordered vasoactive medications to optimize hemodynamic stability  - Assess quality of pulses, skin color and temperature  - Assess for signs of decreased coronary artery perfusion  - Instruct patient to report change in severity of symptoms  Outcome: Progressing     Problem: CARDIOVASCULAR - ADULT  Goal: Absence of cardiac dysrhythmias or at baseline rhythm  Description:  INTERVENTIONS:  - Continuous cardiac monitoring, vital signs, obtain 12 lead EKG if ordered  - Administer antiarrhythmic and heart rate control medications as ordered  - Monitor electrolytes and administer replacement therapy as ordered  Outcome: Progressing     Problem: SKIN/TISSUE INTEGRITY - ADULT  Goal: Incision(s), wounds(s) or drain site(s) healing without S/S of infection  Description: INTERVENTIONS  - Assess and document dressing, incision, wound bed, drain sites and surrounding tissue  - Provide patient and family education  - Perform skin care/dressing changes  Outcome: Progressing     Problem: METABOLIC, FLUID AND ELECTROLYTES - ADULT  Goal: Electrolytes maintained within normal limits  Description: INTERVENTIONS:  - Monitor labs and assess patient for signs and symptoms of electrolyte imbalances  - Administer electrolyte replacement as ordered  - Monitor response to electrolyte replacements, including repeat lab results as appropriate  - Instruct patient on fluid and nutrition as appropriate  Outcome: Progressing  Goal: Fluid balance maintained  Description: INTERVENTIONS:  - Monitor labs   - Monitor I/O and WT  - Instruct patient on fluid and nutrition as appropriate  - Assess for signs & symptoms of volume excess or deficit  Outcome: Progressing  Goal: Glucose maintained within target range  Description: INTERVENTIONS:  - Monitor Blood Glucose as ordered  - Assess for signs and symptoms of hyperglycemia and hypoglycemia  - Administer ordered medications to maintain glucose within target range  - Assess nutritional intake and initiate nutrition service referral as needed  Outcome: Progressing     Problem: METABOLIC, FLUID AND ELECTROLYTES - ADULT  Goal: Electrolytes maintained within normal limits  Description: INTERVENTIONS:  - Monitor labs and assess patient for signs and symptoms of electrolyte imbalances  - Administer electrolyte replacement as ordered  - Monitor response to electrolyte  replacements, including repeat lab results as appropriate  - Instruct patient on fluid and nutrition as appropriate  Outcome: Progressing

## 2024-06-22 NOTE — PROGRESS NOTES
NEPHROLOGY PROGRESS NOTE   Joaquin Frankel 44 y.o. male MRN: [id number]  Unit/Bed#: [address] [place] [address] Encounter: [id number]  Reason for Consult: ARF      SUMMARY:    43 yo man with PMH of morbid obesity, lower extremity lymphedema, diabetes, hypertension, CAD p/w shortness of breath for the last 2 weeks.  Nephrology is consulted for management of CLOVIS        ASSESSMENT and PLAN:     Acute renal failure, currently dialysis dependent  -- Present on admission with suspected underlying chronic kidney disease as prior baseline creatinine was 1.3 to 1.6 mg/dL with previous episodes of acute kidney injury.  Admission creatinine 6 mg/dL  -- Peak creatinine was 10.4 mg/dL prior to dialysis.  -- Initially started on CRRT then transition to intermittent hemodialysis.  Renal placement initiation on June 7th  -- Serologies: ANCA titer was negative, anti-GBM was negative.  JOSE ROBERTO/anti-double-stranded DNA was negative.  Kappa/lambda ratio was within normal limits.  Serum and urine immunofixation showed IgG kappa monoclonal bands.  Complement C3 and C4 were normal.  Beta-2 microglobulin was elevated.  -- Renal imaging via CAT scan showed no evidence of hydronephrosis  -- Access: Right IJ permacath  -- Status post bone marrow biopsy on June 13.  Awaiting results.  If evidence of multiple myeloma we will then proceed with management plan as per hematology.  If bone marrow biopsy is negative will consider a renal biopsy.  Suspect MGRS.  Awaiting results.  He should have a follow-up with hematology as an outpatient.  If the bone marrow biopsies unremarkable after he has been well dialyzed with some more fluid removal we can plan for renal biopsy as an outpatient  -- Currently dialyzing on a TTS schedule, planning for Monday Wednesday Friday as an outpatient  -- Continue torsemide monitor for renal recovery as an outpatient.  -- Plan for dialysis tomorrow, plan to be Monday Wednesday Friday as an outpatient at [facility]. Outpatient HD  orders have been sent to the outpatient unit  --Plan for dialysis this afternoon.  No objections to discharge from renal standpoint after dialysis     Chronic kidney disease stage III  -- Prior baseline creatinine 1.3 to 1.6 mg/dL  -- Presumably secondary to diabetic kidney disease, hypertensive nephrosclerosis, obesity related renal dysfunction  -- Suspect she will be at a new baseline creatinine if he does recover     Anemia of chronic kidney disease  -- Follow-up bone marrow biopsy  -- SPEP and UPEP positive for IgG kappa monoclonal bands  --Will need follow-up with hematology as an outpatient  --Follow-up bone marrow biopsy if evidence of multiple myeloma would need recommendations in regards to JOSSELYN from hematology  -- Received IV iron     Hypertension  --Volume mediated, obese with a BMI greater than 60  --Blood pressures are controlled with volume removal  --Continue ultrafiltration with dialysis  -- Continue current management     Mineral bone disorder-chronic kidney disease  -- Secondary hyperparathyroidism of renal origin.  -- Vitamin D deficiency  -- Hyperphosphatemia  --Currently on vitamin D supplementation  -- Continue current management     Hyponatremia  --Sodium 127  --Volume mediated, with acute renal failure  -- Continue torsemide and fluid restriction  --Will monitor on dialysis  --Adjust sodium bath to 138     Systemic inflammatory response syndrome  -- CT of the left lower extremity showed extensive subcutaneous edema and skin thickening  -- Blood cultures are negative  --Completed course of cefazolin  -- Draining abscess under pannus on June 13 grew out beta-hemolytic Streptococcus group C     Chronic lymphedema     Morbid obesity  -- BMI 64.2 will fluctuate based on volume status    Discussed with primary team      SUBJECTIVE / INTERVAL HISTORY:    No complaints eating lunch feels well    OBJECTIVE:  Current Weight: Weight - Scale: (!) 184 kg (405 lb)  Vitals:    06/21/24 1859 06/21/24 2220  06/22/24 0610 06/22/24 0803   BP: 121/66 133/63  130/64   Pulse: 55 60  68   Resp: 16 18     Temp: 98.2 °F (36.8 °C) 98.3 °F (36.8 °C)     TempSrc:       SpO2: 100% 91%  93%   Weight:   (!) 184 kg (405 lb)    Height:         No intake or output data in the 24 hours ending 06/22/24 1228    Review of Systems:    Constitutional: Negative for chills and fever.   HENT: Negative for ear pain and sore throat.    Eyes: Negative for pain and visual disturbance.   Respiratory: Negative for cough and shortness of breath.    Cardiovascular: Negative for chest pain and palpitations.   Gastrointestinal: Negative for abdominal pain and vomiting.   Genitourinary: Negative for dysuria and hematuria.   Musculoskeletal: Negative for arthralgias and back pain.   Skin: Negative for color change and rash.   Neurological: Negative for seizures and syncope.   12 point ROS has been reviewed.    Physical Exam  Vitals and nursing note reviewed.   Constitutional:       General: He is not in acute distress.     Appearance: He is well-developed. He is obese.   HENT:      Head: Normocephalic and atraumatic.   Eyes:      General: No scleral icterus.     Conjunctiva/sclera: Conjunctivae normal.      Pupils: Pupils are equal, round, and reactive to light.   Cardiovascular:      Rate and Rhythm: Normal rate and regular rhythm.      Heart sounds: S1 normal and S2 normal. No murmur heard.     No friction rub. No gallop.   Pulmonary:      Effort: Pulmonary effort is normal. No respiratory distress.      Breath sounds: Normal breath sounds. No wheezing or rales.   Abdominal:      General: Bowel sounds are normal.      Palpations: Abdomen is soft.      Tenderness: There is no abdominal tenderness. There is no rebound.   Musculoskeletal:         General: Normal range of motion.      Cervical back: Normal range of motion and neck supple.      Right lower leg: Edema present.      Left lower leg: Edema present.   Skin:     Findings: No rash.   Neurological:       Mental Status: He is alert and oriented to person, place, and time.   Psychiatric:         Behavior: Behavior normal.         Medications:    Current Facility-Administered Medications:     atorvastatin (LIPITOR) tablet 80 mg, 80 mg, Oral, Daily, PETRA Briceño, 80 mg at 06/22/24 0811    Cholecalciferol (VITAMIN D3) tablet 5,000 Units, 5,000 Units, Oral, Daily, PETRA Briceño, 5,000 Units at 06/22/24 0811    cyanocobalamin (VITAMIN B-12) tablet 1,000 mcg, 1,000 mcg, Oral, Daily, PETRA Briceño, 1,000 mcg at 06/22/24 0811    docusate sodium (COLACE) capsule 100 mg, 100 mg, Oral, BID, PETRA Briceño, 100 mg at 06/17/24 0857    heparin (porcine) subcutaneous injection 5,000 Units, 5,000 Units, Subcutaneous, Q8H SHAZIA, PETRA Briceño, 5,000 Units at 06/22/24 0509    HYDROmorphone (DILAUDID) injection 0.5 mg, 0.5 mg, Intravenous, Q3H PRN, Alyssa Levy MD, 0.5 mg at 06/22/24 0953    insulin lispro (HumALOG/ADMELOG) 100 units/mL subcutaneous injection 1-6 Units, 1-6 Units, Subcutaneous, HS, PETRA Briceño, 1 Units at 06/06/24 2150    insulin lispro (HumALOG/ADMELOG) 100 units/mL subcutaneous injection 2-12 Units, 2-12 Units, Subcutaneous, TID AC, 2 Units at 06/20/24 1618 **AND** Fingerstick Glucose (POCT), , , TID AC, PETRA Briceño    lidocaine (LIDODERM) 5 % patch 1 patch, 1 patch, Topical, Daily, PETRA Briceño, 1 patch at 06/17/24 0857    metoprolol succinate (TOPROL-XL) 24 hr tablet 25 mg, 25 mg, Oral, Daily, Jose De Jesus Cardozo PA-C, 25 mg at 06/22/24 0811    midodrine (PROAMATINE) tablet 5 mg, 5 mg, Oral, Before Dialysis, PETRA Briceño, 5 mg at 06/11/24 1317    nystatin (MYCOSTATIN) powder 1 Application, 1 Application, Topical, BID, PETRA Briceño, 1 Application at 06/22/24 0811    ondansetron (ZOFRAN) injection 4 mg, 4 mg, Intravenous, Q6H PRN,  PETRA Briceño, 4 mg at 06/22/24 1015    oxyCODONE (ROXICODONE) IR tablet 5 mg, 5 mg, Oral, Q6H PRN, Dennis Waterman MD, 5 mg at 06/22/24 0810    polyethylene glycol (MIRALAX) packet 17 g, 17 g, Oral, Daily PRN, PETRA Briceño    senna (SENOKOT) tablet 8.6 mg, 1 tablet, Oral, HS, PETRA Briceño, 8.6 mg at 06/17/24 2114    torsemide (DEMADEX) tablet 40 mg, 40 mg, Oral, Daily, Kylie Kendall MD, 40 mg at 06/22/24 0811    Laboratory Results:  Results from last 7 days   Lab Units 06/22/24  0505 06/21/24  0531 06/20/24  0538 06/19/24  0307 06/19/24  0301 06/18/24  0603 06/17/24  0450 06/16/24  0510   WBC Thousand/uL 7.82 8.30 9.65  --  12.82* 12.61* 11.74* 12.57*   HEMOGLOBIN g/dL 6.8* 6.6* 7.1*  --  8.0* 7.5* 7.5* 7.7*   HEMATOCRIT % 21.8* 21.0* 22.7*  --  25.2* 24.2* 23.6* 24.2*   PLATELETS Thousands/uL 310 340 377  --  405* 378 346 316   POTASSIUM mmol/L 5.0 4.8 5.0 4.7  --  5.0 4.5 4.0   CHLORIDE mmol/L 91* 92* 91* 92*  --  91* 93* 91*   CO2 mmol/L 26 26 24 25  --  23 27 27   BUN mg/dL 54* 44* 57* 48*  --  66* 56* 48*   CREATININE mg/dL 11.31* 9.46* 11.36* 9.30*  --  11.18* 9.48* 8.07*   CALCIUM mg/dL 8.7 8.2* 8.5 8.4  --  8.3* 8.1* 8.0*       PLEASE NOTE:  This encounter was completed utilizing the M- Mod[employer]Fluency Direct Speech Voice Recognition Software. Grammatical errors, random word insertions, pronoun errors and incomplete sentences are occasional consequences of the system due to software limitations, ambient noise and hardware issues.These may be missed by proof reading prior to affixing electronic signature. Any questions or concerns about the content, text or information contained within the body of this dictation should be directly addressed to the physician for clarification. Please do not hesitate to call me directly if you have any any questions or concerns.

## 2024-06-23 LAB
ABO GROUP BLD: NORMAL
ANION GAP SERPL CALCULATED.3IONS-SCNC: 10 MMOL/L (ref 4–13)
BASOPHILS # BLD AUTO: 0.07 THOUSANDS/ÂΜL (ref 0–0.1)
BASOPHILS NFR BLD AUTO: 1 % (ref 0–1)
BLD GP AB SCN SERPL QL: NEGATIVE
BUN SERPL-MCNC: 34 MG/DL (ref 5–25)
CALCIUM SERPL-MCNC: 8.4 MG/DL (ref 8.4–10.2)
CHLORIDE SERPL-SCNC: 92 MMOL/L (ref 96–108)
CO2 SERPL-SCNC: 26 MMOL/L (ref 21–32)
CREAT SERPL-MCNC: 8.97 MG/DL (ref 0.6–1.3)
EOSINOPHIL # BLD AUTO: 0.43 THOUSAND/ÂΜL (ref 0–0.61)
EOSINOPHIL NFR BLD AUTO: 6 % (ref 0–6)
ERYTHROCYTE [DISTWIDTH] IN BLOOD BY AUTOMATED COUNT: 14.2 % (ref 11.6–15.1)
GFR SERPL CREATININE-BSD FRML MDRD: 6 ML/MIN/1.73SQ M
GLUCOSE SERPL-MCNC: 104 MG/DL (ref 65–140)
GLUCOSE SERPL-MCNC: 105 MG/DL (ref 65–140)
GLUCOSE SERPL-MCNC: 128 MG/DL (ref 65–140)
GLUCOSE SERPL-MCNC: 87 MG/DL (ref 65–140)
GLUCOSE SERPL-MCNC: 94 MG/DL (ref 65–140)
HCT VFR BLD AUTO: 22 % (ref 36.5–49.3)
HGB BLD-MCNC: 7 G/DL (ref 12–17)
IMM GRANULOCYTES # BLD AUTO: 0.03 THOUSAND/UL (ref 0–0.2)
IMM GRANULOCYTES NFR BLD AUTO: 0 % (ref 0–2)
LYMPHOCYTES # BLD AUTO: 1.1 THOUSANDS/ÂΜL (ref 0.6–4.47)
LYMPHOCYTES NFR BLD AUTO: 16 % (ref 14–44)
MCH RBC QN AUTO: 29 PG (ref 26.8–34.3)
MCHC RBC AUTO-ENTMCNC: 31.8 G/DL (ref 31.4–37.4)
MCV RBC AUTO: 91 FL (ref 82–98)
MONOCYTES # BLD AUTO: 0.64 THOUSAND/ÂΜL (ref 0.17–1.22)
MONOCYTES NFR BLD AUTO: 9 % (ref 4–12)
NEUTROPHILS # BLD AUTO: 4.52 THOUSANDS/ÂΜL (ref 1.85–7.62)
NEUTS SEG NFR BLD AUTO: 68 % (ref 43–75)
NRBC BLD AUTO-RTO: 0 /100 WBCS
PLATELET # BLD AUTO: 344 THOUSANDS/UL (ref 149–390)
PMV BLD AUTO: 8.8 FL (ref 8.9–12.7)
POTASSIUM SERPL-SCNC: 4.6 MMOL/L (ref 3.5–5.3)
RBC # BLD AUTO: 2.41 MILLION/UL (ref 3.88–5.62)
RH BLD: POSITIVE
SODIUM SERPL-SCNC: 128 MMOL/L (ref 135–147)
SPECIMEN EXPIRATION DATE: NORMAL
WBC # BLD AUTO: 6.79 THOUSAND/UL (ref 4.31–10.16)

## 2024-06-23 PROCEDURE — 86850 RBC ANTIBODY SCREEN: CPT | Performed by: STUDENT IN AN ORGANIZED HEALTH CARE EDUCATION/TRAINING PROGRAM

## 2024-06-23 PROCEDURE — 99233 SBSQ HOSP IP/OBS HIGH 50: CPT | Performed by: STUDENT IN AN ORGANIZED HEALTH CARE EDUCATION/TRAINING PROGRAM

## 2024-06-23 PROCEDURE — 80048 BASIC METABOLIC PNL TOTAL CA: CPT | Performed by: STUDENT IN AN ORGANIZED HEALTH CARE EDUCATION/TRAINING PROGRAM

## 2024-06-23 PROCEDURE — 82948 REAGENT STRIP/BLOOD GLUCOSE: CPT

## 2024-06-23 PROCEDURE — 99232 SBSQ HOSP IP/OBS MODERATE 35: CPT | Performed by: INTERNAL MEDICINE

## 2024-06-23 PROCEDURE — P9016 RBC LEUKOCYTES REDUCED: HCPCS

## 2024-06-23 PROCEDURE — 85025 COMPLETE CBC W/AUTO DIFF WBC: CPT | Performed by: STUDENT IN AN ORGANIZED HEALTH CARE EDUCATION/TRAINING PROGRAM

## 2024-06-23 PROCEDURE — 86900 BLOOD TYPING SEROLOGIC ABO: CPT | Performed by: STUDENT IN AN ORGANIZED HEALTH CARE EDUCATION/TRAINING PROGRAM

## 2024-06-23 PROCEDURE — 86923 COMPATIBILITY TEST ELECTRIC: CPT

## 2024-06-23 PROCEDURE — 30233N1 TRANSFUSION OF NONAUTOLOGOUS RED BLOOD CELLS INTO PERIPHERAL VEIN, PERCUTANEOUS APPROACH: ICD-10-PCS | Performed by: STUDENT IN AN ORGANIZED HEALTH CARE EDUCATION/TRAINING PROGRAM

## 2024-06-23 PROCEDURE — 86901 BLOOD TYPING SEROLOGIC RH(D): CPT | Performed by: STUDENT IN AN ORGANIZED HEALTH CARE EDUCATION/TRAINING PROGRAM

## 2024-06-23 RX ORDER — ISOSORBIDE MONONITRATE 30 MG/1
30 TABLET, EXTENDED RELEASE ORAL DAILY
Status: DISCONTINUED | OUTPATIENT
Start: 2024-06-23 | End: 2024-06-25

## 2024-06-23 RX ORDER — NITROGLYCERIN 0.4 MG/1
0.4 TABLET SUBLINGUAL
Status: DISCONTINUED | OUTPATIENT
Start: 2024-06-23 | End: 2024-06-26 | Stop reason: HOSPADM

## 2024-06-23 RX ADMIN — ISOSORBIDE MONONITRATE 30 MG: 30 TABLET, EXTENDED RELEASE ORAL at 11:45

## 2024-06-23 RX ADMIN — HYDROMORPHONE HYDROCHLORIDE 0.5 MG: 1 INJECTION, SOLUTION INTRAMUSCULAR; INTRAVENOUS; SUBCUTANEOUS at 06:10

## 2024-06-23 RX ADMIN — HYDROMORPHONE HYDROCHLORIDE 0.5 MG: 1 INJECTION, SOLUTION INTRAMUSCULAR; INTRAVENOUS; SUBCUTANEOUS at 09:52

## 2024-06-23 RX ADMIN — NYSTATIN 1 APPLICATION: 100000 POWDER TOPICAL at 17:24

## 2024-06-23 RX ADMIN — HEPARIN SODIUM 5000 UNITS: 5000 INJECTION, SOLUTION INTRAVENOUS; SUBCUTANEOUS at 22:53

## 2024-06-23 RX ADMIN — ATORVASTATIN CALCIUM 80 MG: 80 TABLET, FILM COATED ORAL at 08:14

## 2024-06-23 RX ADMIN — OXYCODONE HYDROCHLORIDE 5 MG: 5 TABLET ORAL at 00:33

## 2024-06-23 RX ADMIN — SENNOSIDES 8.6 MG: 8.6 TABLET, FILM COATED ORAL at 22:53

## 2024-06-23 RX ADMIN — HEPARIN SODIUM 5000 UNITS: 5000 INJECTION, SOLUTION INTRAVENOUS; SUBCUTANEOUS at 13:14

## 2024-06-23 RX ADMIN — HEPARIN SODIUM 5000 UNITS: 5000 INJECTION, SOLUTION INTRAVENOUS; SUBCUTANEOUS at 06:11

## 2024-06-23 RX ADMIN — NYSTATIN 1 APPLICATION: 100000 POWDER TOPICAL at 08:15

## 2024-06-23 RX ADMIN — OXYCODONE HYDROCHLORIDE 5 MG: 5 TABLET ORAL at 08:14

## 2024-06-23 RX ADMIN — IRON SUCROSE 200 MG: 20 INJECTION, SOLUTION INTRAVENOUS at 13:23

## 2024-06-23 RX ADMIN — OXYCODONE HYDROCHLORIDE 5 MG: 5 TABLET ORAL at 17:18

## 2024-06-23 RX ADMIN — HYDROMORPHONE HYDROCHLORIDE 0.5 MG: 1 INJECTION, SOLUTION INTRAMUSCULAR; INTRAVENOUS; SUBCUTANEOUS at 18:33

## 2024-06-23 RX ADMIN — HYDROMORPHONE HYDROCHLORIDE 0.5 MG: 1 INJECTION, SOLUTION INTRAMUSCULAR; INTRAVENOUS; SUBCUTANEOUS at 02:44

## 2024-06-23 RX ADMIN — HYDROMORPHONE HYDROCHLORIDE 0.5 MG: 1 INJECTION, SOLUTION INTRAMUSCULAR; INTRAVENOUS; SUBCUTANEOUS at 13:13

## 2024-06-23 RX ADMIN — HYDROMORPHONE HYDROCHLORIDE 0.5 MG: 1 INJECTION, SOLUTION INTRAMUSCULAR; INTRAVENOUS; SUBCUTANEOUS at 22:52

## 2024-06-23 RX ADMIN — Medication 5000 UNITS: at 08:15

## 2024-06-23 RX ADMIN — TORSEMIDE 40 MG: 20 TABLET ORAL at 08:15

## 2024-06-23 RX ADMIN — METOPROLOL SUCCINATE 25 MG: 25 TABLET, EXTENDED RELEASE ORAL at 08:15

## 2024-06-23 RX ADMIN — CYANOCOBALAMIN TAB 500 MCG 1000 MCG: 500 TAB at 08:15

## 2024-06-23 NOTE — PROGRESS NOTES
[facility]  Progress Note  Name: Joaquin Frankel I  MRN: [id number]  Unit/Bed#: 4 [address] I Date of Admission: 6/3/2024   Date of Service: 6/23/2024 I Hospital Day: 20    Assessment & Plan   * Acute renal failure (ARF) (HCC)  Assessment & Plan  POA Cr 6.40 >11.36 > 9.46> 11.31 >8.97    BL CR 1.3-1.4  Initiated on HD on 6/7  UA: Hematuria, proteinuria.  UA CR - 5280  Serologies: ANCA titer was negative, anti-GBM was negative.  JOSE ROBERTO/anti-double-stranded DNA was negative.  Kappa/lambda ratio was within normal limits.  Serum and urine immunofixation showed IgG kappa monoclonal bands.  Complement C3 and C4 were normal.  Beta-2 microglobulin was elevated.  CT: No evidence of hydronephrosis  D/W recs nephrology:  Suspect MGRS  HD TTS schedule.  Plan for next dialysis today> OP HD  At this time no evidence of renal recovery.  Continue dialysis schedule.  DC once established stable from renal standpoint for discharge. TTS schedule  Continue Demadex  F/U bone marrow biopsies/renal biopsy      SIRS (systemic inflammatory response syndrome) (HCC)-resolved as of 6/21/2024  Assessment & Plan  CT left lower extremity-extensive subcutaneous edema and skin thickening  CT CAP-without any acute abnormality  BCx negative, MRSA surveillance negative  Noted to left lower extremity cellulitis superimposed on lymphedema, now improved  Source likely recurrent draining abscess under the pannus on 6/13.    Cx: Of beta-hemolytic Streptococcus group C  S/P I&D of the pannus by surgery on 6/13.    Continue IV cefazolin post HD, > 6/18  Negative venous Doppler    Hyponatremia  Assessment & Plan  Na-128  2/2 Vollume overload  Continue on HD  D/W nephrology recs:  Per nephro overload nonconcerning  Fluid restrict  HD    Coronary artery disease  Assessment & Plan  Patient with a history of coronary artery disease with multivessel disease status post stents.  Cardiology following, input appreciated    Held aspirin  initially for biopsy, subsequently resumed.    Continue on Toprol-XL  Continue statin  D/W cardiology recs:  NM scan prior to discharge contingent on clinical course improvement    Anemia of chronic disease  Assessment & Plan  Patient has anemia of chronic disease likely secondary to underlying nephropathy,?  Myeloma.      He did have some episodes of hematuria.  No further episodes. No further overt bleeding    HG down trended to 6.7 g/dL, s/p 2 PRBC on 6/11, 6/23  B12 310, continue supplementation  Iron panel shows iron deficiency.    S/P IV venofer 200mg EOD  transfuse for hemoglobin less than 7.0 or drastic drop in hemoglobin  Optimize anemia before cardiac workup    Wound of abdomen  Assessment & Plan  Seen by wound care, recommended general surgery consultation  Appreciate surgery input  Continue local wound care per wound care nurse  Follow-up in the outpatient wound center    Acute respiratory failure with hypoxia (HCC)  Assessment & Plan  Noted to have worsening hypoxia during hospitalization requiring MFNC  Likely multifactorial including volume overload, suspected underlying ANGY via OHS.  CT chest with evidence of bibasilar effusion with compressive atelectasis  Continue ultrafiltration as tolerated  Continue supplemental oxygen as needed  Incentive spirometry  Patient states he has an upcoming appointment with a sleep specialist and ultimately will undergo a sleep study as an outpatient.  He has been told he has overnight hypoxia.  Overnight pulse ox      Chronic acquired lymphedema  Assessment & Plan  Patient developed LLE lymphedema after left lower extremity surgical procedure several years ago.  Patient complaining of increasing pain in the left lower extremity likely secondary to volume overload.  Left lower extremity venous Doppler: Negative  Prior referrals ambulatory referral to lymphedema clinic.    Morbid obesity (HCC)  Assessment & Plan  With BMI of 64.42  Encourage lifestyle modification on  discharge    Diabetes (HCC)  Assessment & Plan  Lab Results   Component Value Date    HGBA1C 5.7 (H) 06/09/2024     Recent Labs     06/22/24  1628 06/22/24  2047 06/23/24  0730 06/23/24  1121   POCGLU 112 98 87 105     Blood Sugar Average: Last 72 hrs:  (P) [id number]    Amaryl held during his hospitalization.   Acceptable, continue to monitor on current regimen  Continue diabetic diet.      Hypertension  Assessment & Plan  Held amlodipine and carvedilol for low blood pressure initially  Requiring midodrine predialysis as needed  Blood pressure stable/improving at present  Nephro recs following:    Started Toprol-XL 25 mg daily  Started on torsemide 40 mg daily per nephrology  Monitor intake output, daily weight  Monitor blood pressure           VTE Pharmacologic Prophylaxis: VTE Score: 7 Moderate Risk (Score 3-4) - Pharmacological DVT Prophylaxis Ordered: heparin.    Mobility:   Basic Mobility Inpatient Raw Score: 23  JH-HLM Goal: 7: Walk 25 feet or more  JH-HLM Achieved: 7: Walk 25 feet or more  JH-HLM Goal achieved. Continue to encourage appropriate mobility.    Patient Centered Rounds: I evaluated the patient without nursing staff present due to alternate patient care responsibilities    Discussions with Specialists or Other Care Team Provider: Nephrology, cardiology    Education and Discussions with Family / Patient:  Spoke with patient.     Total Time Spent on Date of Encounter in care of patient: 45 mins. This time was spent on one or more of the following: performing physical exam; counseling and coordination of care; obtaining or reviewing history; documenting in the medical record; reviewing/ordering tests, medications or procedures; communicating with other healthcare professionals and discussing with patient's family/caregivers.    Current Length of Stay: 20 day(s)  Current Patient Status: Inpatient   Certification Statement: The patient will continue to require additional inpatient hospital  stay due to clinical course and specialist recs  Discharge Plan: Anticipate discharge in 24-48 hrs to discharge location to be determined pending rehab evaluations.    Code Status: Level 1 - Full Code    Subjective:   Patient seen and examined at bedside reported that he was having chest pain with ambulation and was going to the lay back in the bed it took a long time to go away.  Patient denied any nausea, reported slight headache.  Patient denies any palpitations reported being compliant with O2 overnight.  Patient currently satting appropriately on room air.    Objective:     Vitals:   Temp (24hrs), Av °F (36.7 °C), Min:97.6 °F (36.4 °C), Max:98.2 °F (36.8 °C)    Temp:  [97.6 °F (36.4 °C)-98.2 °F (36.8 °C)] 97.9 °F (36.6 °C)  HR:  [53-70] 60  Resp:  [16-20] 16  BP: (102-133)/(50-67) 126/50  SpO2:  [89 %-97 %] 93 %  Body mass index is 62.18 kg/m².     Input and Output Summary (last 24 hours):     Intake/Output Summary (Last 24 hours) at 2024 1217  Last data filed at 2024 0601  Gross per 24 hour   Intake 500 ml   Output 4550 ml   Net -4050 ml       Physical Exam:   Physical Exam  Vitals and nursing note reviewed.   Constitutional:       General: He is not in acute distress.     Appearance: He is well-developed. He is obese.   HENT:      Head: Normocephalic and atraumatic.   Eyes:      Conjunctiva/sclera: Conjunctivae normal.   Cardiovascular:      Rate and Rhythm: Normal rate and regular rhythm.      Heart sounds: No murmur heard.  Pulmonary:      Effort: Pulmonary effort is normal. No respiratory distress.      Breath sounds: Normal breath sounds. No wheezing or rhonchi.   Abdominal:      Palpations: Abdomen is soft.      Tenderness: There is no abdominal tenderness.   Musculoskeletal:         General: No swelling.      Cervical back: Neck supple.      Right lower leg: Edema present.      Left lower leg: Edema present.   Skin:     General: Skin is warm and dry.      Capillary Refill: Capillary refill  takes less than 2 seconds.   Neurological:      Mental Status: He is alert and oriented to person, place, and time.   Psychiatric:         Mood and Affect: Mood normal.         Behavior: Behavior normal.          Additional Data:     Labs:  Results from last 7 days   Lab Units 06/23/24  0619 06/20/24  0538 06/19/24  0301   WBC Thousand/uL 6.79   < > 12.82*   HEMOGLOBIN g/dL 7.0*   < > 8.0*   HEMATOCRIT % 22.0*   < > 25.2*   PLATELETS Thousands/uL 344   < > 405*   BANDS PCT %  --   --  1   SEGS PCT % 68   < >  --    LYMPHO PCT % 16   < > 14   MONO PCT % 9   < > 3*   EOS PCT % 6   < > 3    < > = values in this interval not displayed.     Results from last 7 days   Lab Units 06/23/24  0619   SODIUM mmol/L 128*   POTASSIUM mmol/L 4.6   CHLORIDE mmol/L 92*   CO2 mmol/L 26   BUN mg/dL 34*   CREATININE mg/dL 8.97*   ANION GAP mmol/L 10   CALCIUM mg/dL 8.4   GLUCOSE RANDOM mg/dL 94           Results from last 7 days   Lab Units 06/23/24  1121 06/23/24  0730 06/22/24  2047 06/22/24  1628 06/22/24  1131 06/22/24  0704 06/21/24  2025 06/21/24  1601 06/21/24  1119 06/21/24  0725 06/20/24  2057 06/20/24  1602   POC GLUCOSE mg/dl 105 87 98 112 90 78 96 105 89 83 109 164*               Lines/Drains:  Invasive Devices       Peripheral Intravenous Line  Duration             Peripheral IV 06/22/24 Distal;Right;Ventral (anterior) Forearm 1 day              Hemodialysis Catheter  Duration             HD Permanent Double Catheter 8 days                      Imaging: Reviewed radiology reports from this admission including: Venous duplex    Recent Cultures (last 7 days):           Last 24 Hours Medication List:   Current Facility-Administered Medications   Medication Dose Route Frequency Provider Last Rate    atorvastatin  80 mg Oral Daily Holly Bush PETRA Sifuentes      Cholecalciferol  5,000 Units Oral Daily Holly Bush PETRA Sifuentes      cyanocobalamin  1,000 mcg Oral Daily Holly PETRA Shell      docusate  sodium  100 mg Oral BID Holly Rachelle Sifuentes, PETRA      heparin (porcine)  5,000 Units Subcutaneous Q8H [facility] Holly Rachelle Sifuentes, PETRA      HYDROmorphone  0.5 mg Intravenous Q3H PRN Alyssa Levy MD      insulin lispro  1-6 Units Subcutaneous HS [facility] Rachelle Sifuentes, CRNP      insulin lispro  2-12 Units Subcutaneous TID AC Hollyjacklyn Sifuentes, PETRA      iron sucrose  200 mg Intravenous Once per day on Monday Wednesday Friday Alyssa Levy MD      isosorbide mononitrate  30 mg Oral Daily Nathalie Cid, PETRA      lidocaine  1 patch Topical Daily [facility] Rachelle Sifuentes, PETRA      metoprolol succinate  25 mg Oral Daily Jose De Jesus Cardozo PA-C      midodrine  5 mg Oral Before Dialysis [facility] Rachelle Sifuentes, PETRA      nitroglycerin  0.4 mg Sublingual Q5 Min PRN Alyssa Levy MD      nystatin  1 Application Topical BID [facility] Rachelle Sifuentes, PETRA      ondansetron  4 mg Intravenous Q6H PRN Hollyjacklyn Sifuentes, PETRA      oxyCODONE  5 mg Oral Q6H PRN Dennis Waterman MD      polyethylene glycol  17 g Oral Daily PRN [facility] Rachelle Sifuentes, PETRA      senna  1 tablet Oral HS [facility] Rachelle Sifuentes, PETRA      torsemide  40 mg Oral Daily Kylie Kendall MD          Today, Patient Was Seen By: Alyssa Levy MD    **Please Note: This note may have been constructed using a voice recognition system.**

## 2024-06-23 NOTE — PLAN OF CARE
Problem: Prexisting or High Potential for Compromised Skin Integrity  Goal: Skin integrity is maintained or improved  Description: INTERVENTIONS:  - Identify patients at risk for skin breakdown  - Assess and monitor skin integrity  - Assess and monitor nutrition and hydration status  - Monitor labs   - Assess for incontinence   - Turn and reposition patient  - Assist with mobility/ambulation  - Relieve pressure over bony prominences  - Avoid friction and shearing  - Provide appropriate hygiene as needed including keeping skin clean and dry  - Evaluate need for skin moisturizer/barrier cream  - Collaborate with interdisciplinary team   - Patient/family teaching  - Consider wound care consult   Outcome: Progressing     Problem: PAIN - ADULT  Goal: Verbalizes/displays adequate comfort level or baseline comfort level  Description: Interventions:  - Encourage patient to monitor pain and request assistance  - Assess pain using appropriate pain scale  - Administer analgesics based on type and severity of pain and evaluate response  - Implement non-pharmacological measures as appropriate and evaluate response  - Consider cultural and social influences on pain and pain management  - Notify physician/advanced practitioner if interventions unsuccessful or patient reports new pain  Outcome: Progressing     Problem: INFECTION - ADULT  Goal: Absence or prevention of progression during hospitalization  Description: INTERVENTIONS:  - Assess and monitor for signs and symptoms of infection  - Monitor lab/diagnostic results  - Monitor all insertion sites, i.e. indwelling lines, tubes, and drains  - [place] appropriate cooling/warming therapies per order  - Administer medications as ordered  - Instruct and encourage patient and family to use good hand hygiene technique  - Identify and instruct in appropriate isolation precautions for identified infection/condition  Outcome: Progressing     Problem: SAFETY ADULT  Goal: Patient will  remain free of falls  Description: INTERVENTIONS:  - Educate patient/family on patient safety including physical limitations  - Instruct patient to call for assistance with activity   - Consult OT/PT to assist with strengthening/mobility   - Keep Call bell within reach  - Keep bed low and locked with side rails adjusted as appropriate  - Keep care items and personal belongings within reach  - Initiate and maintain comfort rounds  - Make Fall Risk Sign visible to staff  - Apply yellow socks and bracelet for high fall risk patients  - Consider moving patient to room near nurses station  Outcome: Progressing  Goal: Maintain or return to baseline ADL function  Description: INTERVENTIONS:  -  Assess patient's ability to carry out ADLs; assess patient's baseline for ADL function and identify physical deficits which impact ability to perform ADLs (bathing, care of mouth/teeth, toileting, grooming, dressing, etc.)  - Assess/evaluate cause of self-care deficits   - Assess range of motion  - Assess patient's mobility; develop plan if impaired  - Assess patient's need for assistive devices and provide as appropriate  - Encourage maximum independence but intervene and supervise when necessary  - Involve family in performance of ADLs  - Assess for home care needs following discharge   - Consider OT consult to assist with ADL evaluation and planning for discharge  - Provide patient education as appropriate  Outcome: Progressing  Goal: Maintains/Returns to pre admission functional level  Description: INTERVENTIONS:  - Perform AM-PAC 6 Click Basic Mobility/ Daily Activity assessment daily.  - Set and communicate daily mobility goal to care team and patient/family/caregiver.   - Collaborate with rehabilitation services on mobility goals if consulted  - Out of bed for toileting  - Record patient progress and toleration of activity level   Outcome: Progressing     Problem: CARDIOVASCULAR - ADULT  Goal: Maintains optimal cardiac output  and hemodynamic stability  Description: INTERVENTIONS:  - Monitor I/O, vital signs and rhythm  - Monitor for S/S and trends of decreased cardiac output  - Administer and titrate ordered vasoactive medications to optimize hemodynamic stability  - Assess quality of pulses, skin color and temperature  - Assess for signs of decreased coronary artery perfusion  - Instruct patient to report change in severity of symptoms  Outcome: Progressing  Goal: Absence of cardiac dysrhythmias or at baseline rhythm  Description: INTERVENTIONS:  - Continuous cardiac monitoring, vital signs, obtain 12 lead EKG if ordered  - Administer antiarrhythmic and heart rate control medications as ordered  - Monitor electrolytes and administer replacement therapy as ordered  Outcome: Progressing     Problem: RESPIRATORY - ADULT  Goal: Achieves optimal ventilation and oxygenation  Description: INTERVENTIONS:  - Assess for changes in respiratory status  - Assess for changes in mentation and behavior  - Position to facilitate oxygenation and minimize respiratory effort  - Oxygen administered by appropriate delivery if ordered  - Initiate smoking cessation education as indicated  - Encourage broncho-pulmonary hygiene including cough, deep breathe, Incentive Spirometry  - Assess the need for suctioning and aspirate as needed  - Assess and instruct to report SOB or any respiratory difficulty  - Respiratory Therapy support as indicated  Outcome: Progressing     Problem: SKIN/TISSUE INTEGRITY - ADULT  Goal: Incision(s), wounds(s) or drain site(s) healing without S/S of infection  Description: INTERVENTIONS  - Assess and document dressing, incision, wound bed, drain sites and surrounding tissue  - Provide patient and family education  - Perform skin care/dressing changes  Outcome: Progressing     Problem: NEUROSENSORY - ADULT  Goal: Achieves maximal functionality and self care  Description: INTERVENTIONS  - Monitor swallowing and airway patency with  patient fatigue and changes in neurological status  - Encourage and assist patient to increase activity and self care.   - Encourage visually impaired, hearing impaired and aphasic patients to use assistive/communication devices  Outcome: Progressing     Problem: METABOLIC, FLUID AND ELECTROLYTES - ADULT  Goal: Electrolytes maintained within normal limits  Description: INTERVENTIONS:  - Monitor labs and assess patient for signs and symptoms of electrolyte imbalances  - Administer electrolyte replacement as ordered  - Monitor response to electrolyte replacements, including repeat lab results as appropriate  - Instruct patient on fluid and nutrition as appropriate  Outcome: Progressing  Goal: Fluid balance maintained  Description: INTERVENTIONS:  - Monitor labs   - Monitor I/O and WT  - Instruct patient on fluid and nutrition as appropriate  - Assess for signs & symptoms of volume excess or deficit  Outcome: Progressing  Goal: Glucose maintained within target range  Description: INTERVENTIONS:  - Monitor Blood Glucose as ordered  - Assess for signs and symptoms of hyperglycemia and hypoglycemia  - Administer ordered medications to maintain glucose within target range  - Assess nutritional intake and initiate nutrition service referral as needed  Outcome: Progressing     Problem: MUSCULOSKELETAL - ADULT  Goal: Maintain or return mobility to safest level of function  Description: INTERVENTIONS:  - Assess patient's ability to carry out ADLs; assess patient's baseline for ADL function and identify physical deficits which impact ability to perform ADLs (bathing, care of mouth/teeth, toileting, grooming, dressing, etc.)  - Assess/evaluate cause of self-care deficits   - Assess range of motion  - Assess patient's mobility  - Assess patient's need for assistive devices and provide as appropriate  - Encourage maximum independence but intervene and supervise when necessary  - Involve family in performance of ADLs  - Assess for  home care needs following discharge   - Consider OT consult to assist with ADL evaluation and planning for discharge  - Provide patient education as appropriate  Outcome: Progressing  Goal: Maintain proper alignment of affected body part  Description: INTERVENTIONS:  - Support, maintain and protect limb and body alignment  - Provide patient/ family with appropriate education  Outcome: Progressing

## 2024-06-23 NOTE — PROGRESS NOTES
Progress Note - Cardiology   [facility]     Joaquin Frankel 44 y.o. male MRN: [id number]  : 1980  Unit/Bed#: [address] [place] [address]01 Encounter: [id number]    Assessment and Plan:   1. Chest Pain in a patient with history of coronary artery disease: history of PCI to the left circumflex in , NSTEMI in 2020 and PCI the RCA 2020.    -   Discomfort occurs with activity and resolved with rest     -   continue Lipitor 80 mg once a day    -   continue Toprol-XL 25 mg daily    -   will schedule patient for Lexiscan nuclear stress test in the a.m. to evaluate for ischemia    2. CLOVIS: patient presented with creatinine of six on day of admission 6/3/2024 and peaked at 10.4.    -   Started on CRRT transitioned to intermittent hemodialysis per nephrology during hospitalization    -   has right IJ primary Placed on 2024    -   managed per nephrology     3. Diabetes: hemoglobin A1c 5.7    -    managed per primary team     4. Hypertension: patient had been on Coreg but was transition to Toprol-XL due to hypotension.    -   Procardia XL 30 mg daily started on 2024 by nephrology, blood pressure greatly improved    -   continue to monitor blood pressure     5. Chronic lymphedema: venous duplex of lower extremities currently pending    -   would benefit from lymphedema pumps and therapy     6. Morbid obesity: BMI 62       Subjective / Objective:   Patient seen and examined. He was initially admitted on 6/3/2024 after presenting with complaints of chest pain and found to have a CLOVIS. He has been started on intermittent hemodialysis and biopsies an evaluation of his kidney failure is ongoing with nephrology.    Patient previously was seen in our office 2024 by Dr. Fulton and was scheduled for nuclear stress test for complaints of chest discomfort. Patient yesterday once again noted chest pain. He states he is been having worsening chest discomfort since 2023. He does described  "as similar to the pain he had in 2020 when he had his MI requiring intervention. Patient notes initially when pain started in August it would not begin till approximately after walking 100 yards to get to work from his parking spot. The discomfort would be relieved with rest. He notes this morning while attempting to get out of bed to the chair the discomfort started again. Initial high-sensitivity troponins on admission were negative.    Vitals: Blood pressure 133/52, pulse 62, temperature 98.1 °F (36.7 °C), resp. rate 18, height 5' 7\" (1.702 m), weight (!) 180 kg (397 lb), SpO2 94%.  Vitals:    06/22/24 0610 06/23/24 0600   Weight: (!) 184 kg (405 lb) (!) 180 kg (397 lb)     Body mass index is 62.18 kg/m².  BP Readings from Last 3 Encounters:   06/22/24 133/52   05/22/24 134/74   05/16/24 144/72     Orthostatic Blood Pressures      Flowsheet Row Most Recent Value   Blood Pressure 133/52 filed at 06/22/2024 2215   Patient Position - Orthostatic VS Lying filed at 06/22/2024 1631          I/O         06/21 0701  06/22 0700 06/22 0701  06/23 0700 06/23 0701  06/24 0700    P.O.       I.V. (mL/kg)  500 (2.8)     Total Intake(mL/kg)  500 (2.8)     Urine (mL/kg/hr)  50 (0)     Emesis/NG output       Other  4500     Total Output  4550     Net  -4050                  Invasive Devices       Peripheral Intravenous Line  Duration             Peripheral IV 06/22/24 Distal;Right;Ventral (anterior) Forearm <1 day              Hemodialysis Catheter  Duration             HD Permanent Double Catheter 8 days                      Intake/Output Summary (Last 24 hours) at 6/23/2024 0720  Last data filed at 6/23/2024 0601  Gross per 24 hour   Intake 500 ml   Output 4550 ml   Net -4050 ml         Physical Exam:   Physical Exam  Vitals and nursing note reviewed.   Constitutional:       Appearance: Normal appearance.   HENT:      Right Ear: External ear normal.      Left Ear: External ear normal.   Eyes:      General: No scleral icterus.   "      Right eye: No discharge.         Left eye: No discharge.   Cardiovascular:      Rate and Rhythm: Normal rate and regular rhythm.      Pulses: Normal pulses.      Heart sounds: Normal heart sounds.   Pulmonary:      Effort: Pulmonary effort is normal.      Breath sounds: Normal breath sounds.   Abdominal:      General: Bowel sounds are normal. There is no distension.      Palpations: Abdomen is soft.   Musculoskeletal:      Right lower leg: Edema present.      Left lower leg: Edema present.   Skin:     General: Skin is warm and dry.      Capillary Refill: Capillary refill takes less than 2 seconds.   Neurological:      General: No focal deficit present.      Mental Status: He is alert and oriented to person, place, and time. Mental status is at baseline.   Psychiatric:         Mood and Affect: Mood normal.         Behavior: Behavior is cooperative.              Medications/ Allergies:     Current Facility-Administered Medications   Medication Dose Route Frequency Provider Last Rate    atorvastatin  80 mg Oral Daily Holly Rachelle Sifuentes, TOYANP      Cholecalciferol  5,000 Units Oral Daily [place] Rachelle Sifuentes, TOYANP      cyanocobalamin  1,000 mcg Oral Daily [place] PETRA Shell      docusate sodium  100 mg Oral BID [facility] PETRA Sifuentes      heparin (porcine)  5,000 Units Subcutaneous Q8H SHAZIA [place] PETRA Shell      HYDROmorphone  0.5 mg Intravenous Q3H PRN Alyssa Levy MD      insulin lispro  1-6 Units Subcutaneous HS [place] PETRA Shell      insulin lispro  2-12 Units Subcutaneous TID AC HollyPETRA Avalos      lidocaine  1 patch Topical Daily Holly PETRA Shell      metoprolol succinate  25 mg Oral Daily Jose De Jesus Cardozo PA-C      midodrine  5 mg Oral Before Dialysis HollyPETRA Avalos      nystatin  1 Application Topical BID [place] PETRA Shell      ondansetron  4 mg Intravenous Q6H PRN [facility]  PETRA Sifuentes      oxyCODONE  5 mg Oral Q6H PRN Dennis Waterman MD      polyethylene glycol  17 g Oral Daily PRN PETRA Briceño      senna  1 tablet Oral HS PETRA Briceño      torsemide  40 mg Oral Daily Kylie Kendall MD       HYDROmorphone, 0.5 mg, Q3H PRN  midodrine, 5 mg, Before Dialysis  ondansetron, 4 mg, Q6H PRN  oxyCODONE, 5 mg, Q6H PRN  polyethylene glycol, 17 g, Daily PRN      Allergies   Allergen Reactions    Keflex [Cephalexin] Facial Swelling and Lip Swelling    Amoxicillin Hives     childhood       VTE Pharmacologic Prophylaxis:   Sequential compression device (Venodyne)     Labs:   Troponins:  Results from last 7 days   Lab Units 06/21/24 2037 06/21/24  1839   HSTNI D2 ng/L  --  2   HSTNI D4 ng/L 3  --      CBC with diff:  Results from last 7 days   Lab Units 06/23/24  0619 06/22/24  0505 06/21/24  0531 06/20/24  0538 06/19/24  0301 06/18/24  0603 06/17/24  0450   WBC Thousand/uL 6.79 7.82 8.30 9.65 12.82* 12.61* 11.74*   HEMOGLOBIN g/dL 7.0* 6.8* 6.6* 7.1* 8.0* 7.5* 7.5*   HEMATOCRIT % 22.0* 21.8* 21.0* 22.7* 25.2* 24.2* 23.6*   MCV fL 91 91 90 91 88 90 89   PLATELETS Thousands/uL 344 310 340 377 405* 378 346   RBC Million/uL 2.41* 2.41* 2.33* 2.49* 2.85* 2.68* 2.66*   MCH pg 29.0 28.2 28.3 28.5 28.1 28.0 28.2   MCHC g/dL 31.8 31.2* 31.4 31.3* 31.7 31.0* 31.8   RDW % 14.2 14.1 14.1 14.3 14.3 14.3 14.2   MPV fL 8.8* 8.3* 8.8* 8.7* 8.4* 8.5* 8.2*   NRBC AUTO /100 WBCs 0 0 0 0  --   --   --      CMP:  Results from last 7 days   Lab Units 06/23/24  0619 06/22/24  0505 06/21/24  0531 06/20/24  0538 06/19/24  0307 06/18/24  0603 06/17/24  0450   SODIUM mmol/L 128* 127* 128* 128* 129* 127* 129*   POTASSIUM mmol/L 4.6 5.0 4.8 5.0 4.7 5.0 4.5   CHLORIDE mmol/L 92* 91* 92* 91* 92* 91* 93*   CO2 mmol/L 26 26 26 24 25 23 27   ANION GAP mmol/L 10 10 10 13 12 13 9   BUN mg/dL 34* 54* 44* 57* 48* 66* 56*   CREATININE mg/dL 8.97* 11.31* 9.46* 11.36* 9.30* 11.18* 9.48*   CALCIUM  mg/dL 8.4 8.7 8.2* 8.5 8.4 8.3* 8.1*   EGFR ml/min/1.73sq m 6 4 6 4 6 4 6       Imaging & Testing   I have personally reviewed pertinent reports.     VAS VENOUS DUPLEX - LOWER LIMB BILATERAL    Result Date: 6/17/2024  Narrative:  [facility] REPORT CLINICAL: Indications: Patient presents with worsening bilateral lower extremity edema x a few weeks. Operative History: 2024-06-14 Right Tunneled central line Coronary angioplasty with stent placement Risk Factors The patient has history of Obesity, HTN, Diabetes, Hyperlipidemia, CAD, CKD, and previous smoking (quit 1-5yrs ago).   CONCLUSION:  Impression:  RIGHT LOWER LIMB: No gross evidence of acute or chronic deep vein thrombosis. No gross evidence of superficial thrombophlebitis noted. Doppler evaluation shows a normal response to augmentation maneuvers. Popliteal, posterior tibial and anterior tibial arterial Doppler waveforms are triphasic  LEFT LOWER LIMB: No gross evidence of acute or chronic deep vein thrombosis. No gross evidence of superficial thrombophlebitis noted. Doppler evaluation shows a normal response to augmentation maneuvers. Popliteal, posterior tibial and anterior tibial arterial Doppler waveforms are triphasic/hyperemic.  Tech Note: There is an echogenic structure located in the left inguinal region measuring approximately 3.31cm suggestive of enlarged lymphatic channels.  Technical findings were faxed to chart. Technically difficult/limited study. Some segments may be poorly visualized on today's exam.  SIGNATURE: Electronically Signed by: ANDREA WINSTON on 2024-06-17 08:12:45 PM    IR tunneled central line placement    Result Date: 6/14/2024  Narrative: PROCEDURE: Nontunneled central venous catheter removal. Tunneled dialysis catheter placement. Procedural Personnel Attending physician(s): Radames Liu MD Pre-procedure diagnosis: Anuric CLOVIS on CKD on HD without evidence of renal recovery Post-procedure diagnosis: Same  Clinical History: 44-year-old man with history of acute kidney injury on pre-existing chronic kidney disease, who had a temporary dialysis catheter placed by me on 6/6/2024. On 6/10/2024, a small tear/hole was noted in the catheter I had placed, and that catheter was removed. The critical care service placed a new temporary dialysis catheter on that date. He has had no evidence of renal recovery and requires transition to a tunneled dialysis catheter. PROCEDURE SUMMARY: - Removal of nontunneled central venous catheter - Right internal jugular vein access under real-time ultrasound guidance - Tunneled dialysis catheter placement PROCEDURE DETAILS: Pre-procedure Consent: Informed consent for the procedure including risks, benefits and alternatives was obtained and time-out was performed prior to the procedure. Preparation: The site was prepared and draped using maximal sterile barrier technique including cutaneous antisepsis. Anesthesia/sedation Level of anesthesia/sedation: Moderate sedation (conscious sedation) Anesthesia/sedation administered by: Independent trained observer under attending supervision with continuous monitoring of the patient's level of consciousness and physiologic status Total intra-service sedation time: 1 hour Technique and Findings: Initial fluoroscopic image demonstrated a temporary dialysis catheter in place, with tip terminating in the mid superior vena cava. The access trajectory was not amenable to conversion to a tunneled catheter. The temporary dialysis catheter was removed and hemostasis obtained with gentle manual pressure. Ultrasound demonstrated a widely patent right internal jugular vein, appropriate for access. Local anesthetic was administered. A dermatotomy was made. Under real-time ultrasound guidance, a 21-gauge micropuncture needle was advanced into the right internal jugular vein from a lateral approach. A permanent image was saved. A microwire was advanced and the needle  exchanged for the micropuncture transitional dilator. This access was secured with a flow switch. An appropriate length tunneled dialysis catheter was selected. Local anesthetic was administered along the planned tunnel tract. A dermatotomy was made on the chest. The catheter was tunneled from the chest incision to the right neck incision. Attention was returned to the vascular access. The microwire was exchanged for a 0.035 inch guidewire. The access site was serially dilated and a peel-away sheath was placed. The catheter was inserted via the sheath, which was then peeled away. Completion image demonstrated that the tip terminated in the right atrium. The catheter was checked and both lumens provided brisk flow for dialysis. The catheter was secured with nonabsorbable suture, both lumens flushed and capped per protocol, and a sterile dressing was applied. Contrast None Radiation Dose Fluoroscopy time: 1.1 min Reference air kerma: 20 mGy Additional Details Specimens removed: None Estimated blood loss (mL): Less than 10 Complications: No immediate complications.     Impression: Successful placement of 15.5 Lao by 32 cm right chest/right internal jugular vein tunneled hemodialysis catheter. The tip terminates in the right atrium. The catheter is ready for immediate use. Workstation performed: [id number]     US bedside procedure    Result Date: 6/13/2024  Narrative: [id number]    IR biopsy bone marrow    Result Date: 6/13/2024  Narrative: PROCEDURE: CT-guided bone marrow biopsy and bone marrow aspiration STAFF: Geovani Rodarte M.D. DOSE LENGTH PRODUCT: 1292.30 mGy-cm. This examination, like all CT scans performed in the [facility], was performed utilizing techniques to minimize radiation dose exposure, including the use of iterative reconstruction and automated exposure control. NUMBER OF IMAGES: Multiple. COMPLICATIONS: None. MEDICATIONS: 1% lidocaine Moderate  sedation was monitored by radiology nursing staff.  Monitoring of conscious sedation totaled 0 minutes. INDICATION: Rule out multiple myeloma PROCEDURE: Using intermittent CT guidance, the [employer] system was used to perform bone marrow aspirate via the left posterior superior iliac spine. Approximately 6 mL of marrow was removed and sent to pathology. Next, bone marrow biopsy was performed. The biopsy specimen was placed in formalin and sent to pathology. The needle was then removed and hemostasis was achieved using manual compression.     Impression: Bone marrow aspiration and biopsy. Workstation performed: [id number]     XR chest portable ICU    Result Date: 6/11/2024  Narrative: XR CHEST PORTABLE ICU INDICATION: status post right IJ temporary hemodialysis catheter placement. COMPARISON: 6/8/2024 FINDINGS: Right internal jugular dialysis catheter projects over the SVC. Clear lungs. There is vascular congestion. No pneumothorax or pleural effusion. Enlarged cardiac silhouette, unchanged. Bones are unremarkable for age. Normal upper abdomen.     Impression: 1.  Right internal jugular dialysis catheter projects over the SVC. No pneumothorax. 2.  Cardiomegaly with vascular congestion. Workstation performed: [id number]     CT chest abdomen pelvis wo contrast    Result Date: 6/10/2024  Narrative: CT CHEST, ABDOMEN AND PELVIS WITHOUT IV CONTRAST INDICATION: hypotension, hypervolemic. COMPARISON: 6/3/2024 TECHNIQUE: CT examination of the chest, abdomen and pelvis was performed without intravenous contrast. Multiplanar 2D reformatted images were created from the source data. This examination, like all CT scans performed in the [facility], was performed utilizing techniques to minimize radiation dose exposure, including the use of iterative reconstruction and automated exposure control. Radiation dose length product (DLP) for this visit: 1673 mGy-cm Enteric Contrast: Not administered. FINDINGS: CHEST  LUNGS: Lungs are clear. No tracheal or endobronchial lesion. PLEURA: Small left greater than right basilar effusions with compressive atelectasis HEART/GREAT VESSELS: Heart is unremarkable for patient's age. No thoracic aortic aneurysm. MEDIASTINUM AND SLAVA: Unremarkable. CHEST WALL AND LOWER NECK: Unremarkable. ABDOMEN LIVER/BILIARY TREE: Enlarged fatty liver noted. GALLBLADDER: No calcified gallstones. No pericholecystic inflammatory change. SPLEEN: The spleen appears enlarged. PANCREAS: Unremarkable. ADRENAL GLANDS: Unremarkable. KIDNEYS/URETERS: Unremarkable. No hydronephrosis. STOMACH AND BOWEL: Moderate fecal stasis. APPENDIX: Normal. ABDOMINOPELVIC CAVITY: Mildly prominent left greater than right inguinal nodes. Left greater than right iliac chain adenopathy is redemonstrated and stable. Small periaortic and mesenteric root nodes are also identified. VESSELS: Unremarkable for patient's age. PELVIS REPRODUCTIVE ORGANS: Unremarkable for patient's age. URINARY BLADDER: Decompressed with a Robbins catheter in place ABDOMINAL WALL/INGUINAL REGIONS: Right central line tip terminating at the caval atrial junction. BONES: Age-appropriate degenerative change without acute osseous abnormality.     Impression: 1. Small left greater than right basilar effusions with associated compressive atelectasis. Lungs otherwise clear. 2. Periaortic retroperitoneal, left greater than right iliac chain and inguinal lymph nodes identified, stable and nonspecific. 3. No acute findings. Workstation performed: [id number]     XR chest portable    Result Date: 6/9/2024  Narrative: XR CHEST PORTABLE INDICATION: Fever and leukocytosis. COMPARISON: Chest CT 6/9/2024 and CXR 6/3/2024. FINDINGS: Right jugular catheter in right atrium. Mild pulmonary venous congestion. No pneumothorax or pleural effusion. Mild cardiomegaly. Bones are unremarkable for age. Normal upper abdomen.     Impression: Mild pulmonary venous congestion. Nothing to suggest  pneumonia. Workstation performed: [id number]     CT lower extremity wo contrast left    Result Date: 6/9/2024  Narrative: CT left lower extremity without IV contrast INDICATION: severe pain. COMPARISON: None. TECHNIQUE: CT examination of the left lower extremity from the hip to the foot was performed. This examination, like all CT scans performed in the [facility], was performed utilizing techniques to minimize radiation dose exposure, including the use of iterative reconstruction and automated exposure control software.  Multiplanar 2D reformatted images were created from the source data. Rad dose  1394.86 mGy-cm FINDINGS: OSSEOUS STRUCTURES:  No fracture, dislocation or destructive osseous lesion. No osseous erosion. There is mild to moderate left hip osteoarthrosis. There is moderate to severe tricompartmental osteoarthrosis of the knee. VISUALIZED MUSCULATURE:  Unremarkable. SOFT TISSUES: There is prominent subcutaneous edema and skin thickening in the thigh greater medially. There is severe circumferential subcutaneous edema and skin thickening throughout the lower leg extending into the dorsum of the foot. This may either reflect bland edema or extensive cellulitis. A well-defined rim-enhancing fluid collection not seen to suggest a discrete abscess. No soft tissue gas. OTHER PERTINENT FINDINGS:  None.     Impression: Extensive subcutaneous edema and skin thickening as described above. This may either reflect bland edema or extensive cellulitis. A well-defined rim-enhancing fluid collection not seen to suggest a discrete abscess. No soft tissue gas. Osteoarthrosis as above. No osseous erosion. Workstation performed: [id number]     IR temporary dialysis catheter placement    Result Date: 6/7/2024  Narrative: PROCEDURE: Temporary dialysis catheter placement Procedural Personnel Attending physician(s): Radames Liu MD Pre-procedure diagnosis: CLOVIS on CKD Post-procedure diagnosis: Same  Clinical History: 44-year-old man with history of acute kidney injury on pre-existing chronic kidney disease, now with uremic symptoms and need to initiate hemodialysis. PROCEDURE SUMMARY: Ultrasound and fluoroscopy guided temporary dialysis catheter placement PROCEDURE DETAILS: Pre-procedure Consent: Informed consent for the procedure including risks, benefits and alternatives was obtained and time-out was performed prior to the procedure. Preparation: The site was prepared and draped using maximal sterile barrier technique including cutaneous antisepsis. Anesthesia/sedation Level of anesthesia/sedation: No sedation Technique and Findings: Immediate preprocedure ultrasound demonstrated a large, dilated, and mildly difficult to compress right internal jugular vein, consistent with patient's fluid overload. Local anesthetic was administered. A dermatotomy was made. Access was obtained using a 21-gauge micropuncture needle under real-time ultrasound guidance, taking a course amenable for future conversion to tunneled dialysis catheter. A microwire was advanced to the right atrium and the needle exchanged for a micropuncture transitional dilator. The intravascular distance was measured with the microwire. A 24 cm temporary dialysis catheter was selected. An Amplatz wire was advanced to the inferior vena cava. The access tract was serially dilated and the temporary dialysis catheter was advanced over the wire into the right atrium. There was significant oozing/bleeding around the catheter, consistent with impaired hemostasis in the setting of uremia. This was refractory to application of pressure. Hemostasis was obtained with a 3-0 Vicryl pursestring suture around the catheter tract. The catheter was secured with nonabsorbable suture. Completion image demonstrated appropriate positioning of the newly placed catheter within the right atrium. The catheter was checked and both lumens provided brisk flow. Contrast Contrast  dose: None Radiation Dose Fluoroscopy time: 1.5 MIN Reference air kerma: 86 mGy Additional Details Specimens removed: None Estimated blood loss (mL): 200 Complications: No immediate complications.     Impression: 1. Uncomplicated nontunneled/temporary dialysis catheter placement (14 Venezuelan by 24 cm). Catheter tip terminates in the right atrium. The catheter is ready for immediate use. 2. Impaired hemostasis was discussed with Dr. Timmons of Nephrology. DDAVP will be administered to help mitigate rebleeding risk. Workstation performed: [id number]     Echo complete w/ contrast if indicated    Result Date: 6/4/2024  Narrative:   Left Ventricle: Left ventricular cavity size is normal. Wall thickness is moderately increased. There is moderate concentric hypertrophy. The left ventricular ejection fraction is 58% by visual estimation. Systolic function is normal. Wall motion is normal. Diastolic function is mildly abnormal, consistent with grade I (abnormal) relaxation.  Left atrial filling pressure is normal.   Left Atrium: The atrium is mildly dilated.   Right Atrium: The atrium is mildly dilated.   Mitral Valve: There is mild regurgitation.   Tricuspid Valve: There is mild regurgitation. The right ventricular systolic pressure is moderately elevated. The estimated right ventricular systolic pressure is 58.00 mmHg.   Pulmonic Valve: There is mild regurgitation.   Compared to previous exam, there is no significant change.     CT chest abdomen pelvis wo contrast    Result Date: 6/3/2024  Narrative: CT CHEST, ABDOMEN AND PELVIS WITHOUT IV CONTRAST INDICATION: Dyspnea and renal failure. COMPARISON: CT of the chest from 2/17/2024, CT of the abdomen and pelvis from 5/4/2022, and CT of the chest, abdomen and pelvis from 12/1/2022. TECHNIQUE: CT examination of the chest, abdomen and pelvis was performed without intravenous contrast due to elevated creatinine. Multiplanar 2D reformatted images were created from the source data.  This examination, like all CT scans performed in the [facility], was performed utilizing techniques to minimize radiation dose exposure, including the use of iterative reconstruction and automated exposure control. Radiation dose length product (DLP) for this visit:  2830 mGy-cm Enteric Contrast: Enteric contrast was not administered. FINDINGS: Evaluation of visceral structures and vasculature is limited by lack of intravenous contrast. CHEST BRONCHOPULMONARY:  Clear central airways. There is slight mosaic attenuation. There are areas of atelectasis/or scarring in the lower lobes, similar to February 2024. PLEURA: Small left pleural effusion. No pneumothorax. HEART/GREAT VESSELS: The heart is normal in size. No pericardial effusion. Normal caliber thoracic aorta. MEDIASTINUM/LYMPH NODES:  No axillary or mediastinal lymphadenopathy by size criteria. Nonspecific subcentimeter mediastinal lymph nodes are seen. Evaluation for hilar lymphadenopathy is limited without IV contrast. CHEST WALL AND LOWER NECK:  Unremarkable. ABDOMEN LIVER/BILIARY TREE:  Unremarkable unenhanced appearance of the liver. No biliary ductal dilation. GALLBLADDER:  No calcified gallstones. No pericholecystic inflammatory change. SPLEEN: Unremarkable unenhanced appearance. PANCREAS:  Unremarkable unenhanced appearance. No dilation of the main pancreatic duct. ADRENAL GLANDS:  Unremarkable unenhanced appearance. KIDNEYS/URETERS:  No intrarenal or ureteral calculi. No hydronephrosis. No definite focal renal lesions. STOMACH AND BOWEL:  Evaluation of the gastrointestinal tract is somewhat limited by underdistention and lack of oral contrast. No bowel obstruction or convincing inflammation. APPENDIX: Normal appendix. ABDOMINOPELVIC CAVITY:  No ascites or pneumoperitoneum. LYMPH NODES: There are several enlarged left external iliac lymph nodes measuring up to 2.1 cm in short axis. There is also an enlarged left inguinal lymph node  measuring 1.5 cm in short axis and several additional smaller left inguinal lymph nodes. These are increased compared to the May 2022 CT. No other abdominal or pelvic lymphadenopathy by size criteria. Bilateral right external iliac lymph nodes are prominent, however below size threshold for lymphadenopathy. VESSELS: Normal caliber aorta. PELVIS REPRODUCTIVE ORGANS:  The prostate gland is not enlarged. URINARY BLADDER:  Unremarkable. ABDOMINAL WALL/INGUINAL REGIONS: No ventral abdominal wall hernia. In large portion of the subcutaneous tissues of the anterior abdominal wall are outside of the field-of-view. There is subcutaneous tissue stranding and skin thickening of the pannus as on the prior studies. There is a partially imaged tubular mildly hyperdense lesion along the left side of the undersurface of the pannus in the skin or immediately deep to the skin, which has been present dating back to at least 2021. MUSCULOSKELETAL:  No focal aggressive osseous lesions. Degenerative changes of the spine. There is a healing, subacute fracture of the right 7th rib anteriorly.     Impression: No acute pulmonary pathology. No acute abdominal or pelvic pathology within limitation of a noncontrast study. In particular, no hydronephrosis. Left inguinal and left external iliac lymphadenopathy, increased from May 2022. This may be reactive to a process in the left lower extremity or in the partially imaged pannus. Additional findings as above. Workstation performed: [id number]     NM lung ventilation / perfusion    Result Date: 6/3/2024  Narrative: VENTILATION AND PERFUSION SCAN INDICATION: Shortness of breath, hypoxia, increased D-dimer, left lower extremity swelling COMPARISON:  Chest radiograph 6/3/2024 TECHNIQUE:  Posterior ventilation imaging was performed after the inhalation of 32 mCi nebulized Tc-99m DTPA with deposition of less than 1 mCi of activity within the lungs.  Multiplanar perfusion imaging was next performed  "following the intravenous administration of 4.2 mCi Tc-99m labeled MAA. FINDINGS: Ventilation imaging demonstrates a minimally heterogeneous distribution of radiopharmaceutical throughout both lungs. There is central deposition of the radiotracer. Perfusion imaging demonstrates a mildly heterogeneous distribution of the radiopharmaceutical throughout both lungs. There is mildly decreased perfusion activity in the left lung as compared to the right, but matching the ventilation pattern. There is no  significant  segmental or subsegmental defect.     Impression: The probability for pulmonary embolus is low. Workstation performed: [id number]     XR chest 1 view portable    Result Date: 6/3/2024  Narrative: XR CHEST PORTABLE INDICATION: chest pain, shortness of breath. COMPARISON: Chest radiograph February 15, 2024 FINDINGS: Clear lungs. No pneumothorax or pleural effusion. Normal cardiomediastinal silhouette. Bones are unremarkable for age. Normal upper abdomen.     Impression: No acute cardiopulmonary disease. Workstation performed: [id number]          Nathalie LAMBERT  Cardiology      \"This note was completed in part utilizing m-modal fluency direct voice recognition software.   Grammatical errors, random word insertion, spelling mistakes, and incomplete sentences may be an occasional consequence of the system secondary to software limitations, ambient noise and hardware issues.    Please read the chart carefully and recognize, using context, where substitutions have occurred.  If you have any questions or concerns about the context, text or information contained within the body of this dictation, please contact myself, the provider, for further clarification.\"  "

## 2024-06-23 NOTE — PLAN OF CARE
Problem: Prexisting or High Potential for Compromised Skin Integrity  Goal: Skin integrity is maintained or improved  Description: INTERVENTIONS:  - Identify patients at risk for skin breakdown  - Assess and monitor skin integrity  - Assess and monitor nutrition and hydration status  - Monitor labs   - Assess for incontinence   - Turn and reposition patient  - Assist with mobility/ambulation  - Relieve pressure over bony prominences  - Avoid friction and shearing  - Provide appropriate hygiene as needed including keeping skin clean and dry  - Evaluate need for skin moisturizer/barrier cream  - Collaborate with interdisciplinary team   - Patient/family teaching  - Consider wound care consult   Outcome: Progressing     Problem: PAIN - ADULT  Goal: Verbalizes/displays adequate comfort level or baseline comfort level  Description: Interventions:  - Encourage patient to monitor pain and request assistance  - Assess pain using appropriate pain scale  - Administer analgesics based on type and severity of pain and evaluate response  - Implement non-pharmacological measures as appropriate and evaluate response  - Consider cultural and social influences on pain and pain management  - Notify physician/advanced practitioner if interventions unsuccessful or patient reports new pain  Outcome: Progressing     Problem: INFECTION - ADULT  Goal: Absence or prevention of progression during hospitalization  Description: INTERVENTIONS:  - Assess and monitor for signs and symptoms of infection  - Monitor lab/diagnostic results  - Monitor all insertion sites, i.e. indwelling lines, tubes, and drains  - [place] appropriate cooling/warming therapies per order  - Administer medications as ordered  - Instruct and encourage patient and family to use good hand hygiene technique  - Identify and instruct in appropriate isolation precautions for identified infection/condition  Outcome: Progressing     Problem: SAFETY ADULT  Goal: Patient will  remain free of falls  Description: INTERVENTIONS:  - Educate patient/family on patient safety including physical limitations  - Instruct patient to call for assistance with activity   - Consult OT/PT to assist with strengthening/mobility   - Keep Call bell within reach  - Keep bed low and locked with side rails adjusted as appropriate  - Keep care items and personal belongings within reach  - Initiate and maintain comfort rounds  - Make Fall Risk Sign visible to staff  - Apply yellow socks and bracelet for high fall risk patients  - Consider moving patient to room near nurses station  Outcome: Progressing  Goal: Maintain or return to baseline ADL function  Description: INTERVENTIONS:  -  Assess patient's ability to carry out ADLs; assess patient's baseline for ADL function and identify physical deficits which impact ability to perform ADLs (bathing, care of mouth/teeth, toileting, grooming, dressing, etc.)  - Assess/evaluate cause of self-care deficits   - Assess range of motion  - Assess patient's mobility; develop plan if impaired  - Assess patient's need for assistive devices and provide as appropriate  - Encourage maximum independence but intervene and supervise when necessary  - Involve family in performance of ADLs  - Assess for home care needs following discharge   - Consider OT consult to assist with ADL evaluation and planning for discharge  - Provide patient education as appropriate  Outcome: Progressing  Goal: Maintains/Returns to pre admission functional level  Description: INTERVENTIONS:  - Perform AM-PAC 6 Click Basic Mobility/ Daily Activity assessment daily.  - Set and communicate daily mobility goal to care team and patient/family/caregiver.   - Collaborate with rehabilitation services on mobility goals if consulted  - Out of bed for toileting  - Record patient progress and toleration of activity level   Outcome: Progressing     Problem: CARDIOVASCULAR - ADULT  Goal: Maintains optimal cardiac output  and hemodynamic stability  Description: INTERVENTIONS:  - Monitor I/O, vital signs and rhythm  - Monitor for S/S and trends of decreased cardiac output  - Administer and titrate ordered vasoactive medications to optimize hemodynamic stability  - Assess quality of pulses, skin color and temperature  - Assess for signs of decreased coronary artery perfusion  - Instruct patient to report change in severity of symptoms  Outcome: Progressing  Goal: Absence of cardiac dysrhythmias or at baseline rhythm  Description: INTERVENTIONS:  - Continuous cardiac monitoring, vital signs, obtain 12 lead EKG if ordered  - Administer antiarrhythmic and heart rate control medications as ordered  - Monitor electrolytes and administer replacement therapy as ordered  Outcome: Progressing     Problem: RESPIRATORY - ADULT  Goal: Achieves optimal ventilation and oxygenation  Description: INTERVENTIONS:  - Assess for changes in respiratory status  - Assess for changes in mentation and behavior  - Position to facilitate oxygenation and minimize respiratory effort  - Oxygen administered by appropriate delivery if ordered  - Initiate smoking cessation education as indicated  - Encourage broncho-pulmonary hygiene including cough, deep breathe, Incentive Spirometry  - Assess the need for suctioning and aspirate as needed  - Assess and instruct to report SOB or any respiratory difficulty  - Respiratory Therapy support as indicated  Outcome: Progressing     Problem: SKIN/TISSUE INTEGRITY - ADULT  Goal: Incision(s), wounds(s) or drain site(s) healing without S/S of infection  Description: INTERVENTIONS  - Assess and document dressing, incision, wound bed, drain sites and surrounding tissue  - Provide patient and family education  - Perform skin care/dressing changes  Outcome: Progressing  Goal: Skin Integrity remains intact(Skin Breakdown Prevention)  Description: Assess:  -Perform Juan assessment every shift  -Clean and moisturize skin every  2hrs  -Inspect skin when repositioning, toileting, and assisting with ADLS  -Assess under medical devices such as Nasal canula every 2hrs  -Assess extremities for adequate circulation and sensation     Bed Management:  -Have minimal linens on bed & keep smooth, unwrinkled  -Change linens as needed when moist or perspiring  -Avoid sitting or lying in one position for more than 2 hours while in bed  -Keep HOB at 45 degrees     Toileting:  -Offer bedside commode  -Assess for incontinence every 2hrs  -Use incontinent care products after each incontinent episode such as protective barriers    Activity:  -Mobilize patient 3 times a day  -Encourage activity and walks on unit  -Encourage or provide ROM exercises   -Turn and reposition patient every 2 Hours  -Use appropriate equipment to lift or move patient in bed  -Instruct/ Assist with weight shifting every  when out of bed in chair  -Consider limitation of chair time 2 hour intervals    Skin Care:  -Avoid use of baby powder, tape, friction and shearing, hot water or constrictive clothing  -Relieve pressure over bony prominences using waffle pad  -Do not massage red bony areas    Next Steps:  -Teach patient strategies to minimize risks such as skin breakdown   -Consider consults to  interdisciplinary teams such as   Outcome: Progressing     Problem: NEUROSENSORY - ADULT  Goal: Achieves maximal functionality and self care  Description: INTERVENTIONS  - Monitor swallowing and airway patency with patient fatigue and changes in neurological status  - Encourage and assist patient to increase activity and self care.   - Encourage visually impaired, hearing impaired and aphasic patients to use assistive/communication devices  Outcome: Progressing     Problem: METABOLIC, FLUID AND ELECTROLYTES - ADULT  Goal: Electrolytes maintained within normal limits  Description: INTERVENTIONS:  - Monitor labs and assess patient for signs and symptoms of electrolyte imbalances  - Administer  electrolyte replacement as ordered  - Monitor response to electrolyte replacements, including repeat lab results as appropriate  - Instruct patient on fluid and nutrition as appropriate  Outcome: Progressing  Goal: Fluid balance maintained  Description: INTERVENTIONS:  - Monitor labs   - Monitor I/O and WT  - Instruct patient on fluid and nutrition as appropriate  - Assess for signs & symptoms of volume excess or deficit  Outcome: Progressing  Goal: Glucose maintained within target range  Description: INTERVENTIONS:  - Monitor Blood Glucose as ordered  - Assess for signs and symptoms of hyperglycemia and hypoglycemia  - Administer ordered medications to maintain glucose within target range  - Assess nutritional intake and initiate nutrition service referral as needed  Outcome: Progressing     Problem: MUSCULOSKELETAL - ADULT  Goal: Maintain or return mobility to safest level of function  Description: INTERVENTIONS:  - Assess patient's ability to carry out ADLs; assess patient's baseline for ADL function and identify physical deficits which impact ability to perform ADLs (bathing, care of mouth/teeth, toileting, grooming, dressing, etc.)  - Assess/evaluate cause of self-care deficits   - Assess range of motion  - Assess patient's mobility  - Assess patient's need for assistive devices and provide as appropriate  - Encourage maximum independence but intervene and supervise when necessary  - Involve family in performance of ADLs  - Assess for home care needs following discharge   - Consider OT consult to assist with ADL evaluation and planning for discharge  - Provide patient education as appropriate  Outcome: Progressing  Goal: Maintain proper alignment of affected body part  Description: INTERVENTIONS:  - Support, maintain and protect limb and body alignment  - Provide patient/ family with appropriate education  Outcome: Progressing      Statement Selected

## 2024-06-23 NOTE — PLAN OF CARE
Problem: Prexisting or High Potential for Compromised Skin Integrity  Goal: Skin integrity is maintained or improved  Description: INTERVENTIONS:  - Identify patients at risk for skin breakdown  - Assess and monitor skin integrity  - Assess and monitor nutrition and hydration status  - Monitor labs   - Assess for incontinence   - Turn and reposition patient  - Assist with mobility/ambulation  - Relieve pressure over bony prominences  - Avoid friction and shearing  - Provide appropriate hygiene as needed including keeping skin clean and dry  - Evaluate need for skin moisturizer/barrier cream  - Collaborate with interdisciplinary team   - Patient/family teaching  - Consider wound care consult   6/23/2024 1940 by Jackendie Saint-Julien, RN  Outcome: Progressing  6/23/2024 1940 by Jackendie Saint-Julien, RN  Outcome: Progressing     Problem: PAIN - ADULT  Goal: Verbalizes/displays adequate comfort level or baseline comfort level  Description: Interventions:  - Encourage patient to monitor pain and request assistance  - Assess pain using appropriate pain scale  - Administer analgesics based on type and severity of pain and evaluate response  - Implement non-pharmacological measures as appropriate and evaluate response  - Consider cultural and social influences on pain and pain management  - Notify physician/advanced practitioner if interventions unsuccessful or patient reports new pain  6/23/2024 1940 by Jackendie Saint-Julien, RN  Outcome: Progressing  6/23/2024 1940 by Jackendie Saint-Julien, RN  Outcome: Progressing     Problem: INFECTION - ADULT  Goal: Absence or prevention of progression during hospitalization  Description: INTERVENTIONS:  - Assess and monitor for signs and symptoms of infection  - Monitor lab/diagnostic results  - Monitor all insertion sites, i.e. indwelling lines, tubes, and drains  - [place] appropriate cooling/warming therapies per order  - Administer medications as ordered  - Instruct and  encourage patient and family to use good hand hygiene technique  - Identify and instruct in appropriate isolation precautions for identified infection/condition  6/23/2024 1940 by Jackendie Saint-Julien, RN  Outcome: Progressing  6/23/2024 1940 by Jackendie Saint-Julien, RN  Outcome: Progressing     Problem: SAFETY ADULT  Goal: Patient will remain free of falls  Description: INTERVENTIONS:  - Educate patient/family on patient safety including physical limitations  - Instruct patient to call for assistance with activity   - Consult OT/PT to assist with strengthening/mobility   - Keep Call bell within reach  - Keep bed low and locked with side rails adjusted as appropriate  - Keep care items and personal belongings within reach  - Initiate and maintain comfort rounds  - Make Fall Risk Sign visible to staff  - Apply yellow socks and bracelet for high fall risk patients  - Consider moving patient to room near nurses station  6/23/2024 1940 by Jackendie Saint-Julien, RN  Outcome: Progressing  6/23/2024 1940 by Jackendie Saint-Julien, RN  Outcome: Progressing  Goal: Maintain or return to baseline ADL function  Description: INTERVENTIONS:  -  Assess patient's ability to carry out ADLs; assess patient's baseline for ADL function and identify physical deficits which impact ability to perform ADLs (bathing, care of mouth/teeth, toileting, grooming, dressing, etc.)  - Assess/evaluate cause of self-care deficits   - Assess range of motion  - Assess patient's mobility; develop plan if impaired  - Assess patient's need for assistive devices and provide as appropriate  - Encourage maximum independence but intervene and supervise when necessary  - Involve family in performance of ADLs  - Assess for home care needs following discharge   - Consider OT consult to assist with ADL evaluation and planning for discharge  - Provide patient education as appropriate  6/23/2024 1940 by Jackendie Saint-Julien, RN  Outcome:  Progressing  6/23/2024 1940 by Jackendie Saint-Julien, RN  Outcome: Progressing  Goal: Maintains/Returns to pre admission functional level  Description: INTERVENTIONS:  - Perform AM-PAC 6 Click Basic Mobility/ Daily Activity assessment daily.  - Set and communicate daily mobility goal to care team and patient/family/caregiver.   - Collaborate with rehabilitation services on mobility goals if consulted  - Out of bed for toileting  - Record patient progress and toleration of activity level   6/23/2024 1940 by Jackendie Saint-Julien, RN  Outcome: Progressing  6/23/2024 1940 by Jackendie Saint-Julien, RN  Outcome: Progressing     Problem: CARDIOVASCULAR - ADULT  Goal: Maintains optimal cardiac output and hemodynamic stability  Description: INTERVENTIONS:  - Monitor I/O, vital signs and rhythm  - Monitor for S/S and trends of decreased cardiac output  - Administer and titrate ordered vasoactive medications to optimize hemodynamic stability  - Assess quality of pulses, skin color and temperature  - Assess for signs of decreased coronary artery perfusion  - Instruct patient to report change in severity of symptoms  6/23/2024 1940 by Jackendie Saint-Julien, RN  Outcome: Progressing  6/23/2024 1940 by Jackendie Saint-Julien, RN  Outcome: Progressing  Goal: Absence of cardiac dysrhythmias or at baseline rhythm  Description: INTERVENTIONS:  - Continuous cardiac monitoring, vital signs, obtain 12 lead EKG if ordered  - Administer antiarrhythmic and heart rate control medications as ordered  - Monitor electrolytes and administer replacement therapy as ordered  6/23/2024 1940 by Jackendie Saint-Julien, RN  Outcome: Progressing  6/23/2024 1940 by Jackendie Saint-Julien, RN  Outcome: Progressing     Problem: RESPIRATORY - ADULT  Goal: Achieves optimal ventilation and oxygenation  Description: INTERVENTIONS:  - Assess for changes in respiratory status  - Assess for changes in mentation and behavior  - Position to facilitate  oxygenation and minimize respiratory effort  - Oxygen administered by appropriate delivery if ordered  - Initiate smoking cessation education as indicated  - Encourage broncho-pulmonary hygiene including cough, deep breathe, Incentive Spirometry  - Assess the need for suctioning and aspirate as needed  - Assess and instruct to report SOB or any respiratory difficulty  - Respiratory Therapy support as indicated  6/23/2024 1940 by Jackendie Saint-Julien, RN  Outcome: Progressing  6/23/2024 1940 by Jackendie Saint-Julien, RN  Outcome: Progressing     Problem: SKIN/TISSUE INTEGRITY - ADULT  Goal: Incision(s), wounds(s) or drain site(s) healing without S/S of infection  Description: INTERVENTIONS  - Assess and document dressing, incision, wound bed, drain sites and surrounding tissue  - Provide patient and family education  - Perform skin care/dressing changes  6/23/2024 1940 by Jackendie Saint-Julien, RN  Outcome: Progressing  6/23/2024 1940 by Jackendie Saint-Julien, RN  Outcome: Progressing  Goal: Skin Integrity remains intact(Skin Breakdown Prevention)  Description: Assess:  -Perform Juan assessment every shifts  -Clean and moisturize skin every 2hrs  -Inspect skin when repositioning, toileting, and assisting with ADLS  -Assess under medical devices such as Nc every 2hrs  -Assess extremities for adequate circulation and sensation     Bed Management:  -Have minimal linens on bed & keep smooth, unwrinkled  -Change linens as needed when moist or perspiring  -Avoid sitting or lying in one position for more than 2 hours while in bed  -Keep HOB at 45degrees     Toileting:  -Offer bedside commode  -Assess for incontinence every 2hrs  -Use incontinent care products after each incontinent episode such as protective barriers    Activity:  -Mobilize patient 3 times a day  -Encourage activity and walks on unit  -Encourage or provide ROM exercises   -Turn and reposition patient every 2 Hours  -Use appropriate equipment to lift  or move patient in bed  -Instruct/ Assist with weight shifting every  when out of bed in chair  -Consider limitation of chair time 2 hour intervals    Skin Care:  -Avoid use of baby powder, tape, friction and shearing, hot water or constrictive clothing  -Relieve pressure over bony prominences using waffle cushion  -Do not massage red bony areas    Next Steps:  -Teach patient strategies to minimize risks such as skin breakdown   -Consider consults to  interdisciplinary teams such as RN    6/23/2024 1940 by Jackendie Saint-Julien, RN  Outcome: Progressing  6/23/2024 1940 by Jackendie Saint-Julien, RN  Outcome: Progressing     Problem: NEUROSENSORY - ADULT  Goal: Achieves maximal functionality and self care  Description: INTERVENTIONS  - Monitor swallowing and airway patency with patient fatigue and changes in neurological status  - Encourage and assist patient to increase activity and self care.   - Encourage visually impaired, hearing impaired and aphasic patients to use assistive/communication devices  6/23/2024 1940 by Jackendie Saint-Julien, RN  Outcome: Progressing  6/23/2024 1940 by Jackendie Saint-Julien, RN  Outcome: Progressing     Problem: METABOLIC, FLUID AND ELECTROLYTES - ADULT  Goal: Electrolytes maintained within normal limits  Description: INTERVENTIONS:  - Monitor labs and assess patient for signs and symptoms of electrolyte imbalances  - Administer electrolyte replacement as ordered  - Monitor response to electrolyte replacements, including repeat lab results as appropriate  - Instruct patient on fluid and nutrition as appropriate  6/23/2024 1940 by Jackendie Saint-Julien, RN  Outcome: Progressing  6/23/2024 1940 by Jackendie Saint-Julien, RN  Outcome: Progressing  Goal: Fluid balance maintained  Description: INTERVENTIONS:  - Monitor labs   - Monitor I/O and WT  - Instruct patient on fluid and nutrition as appropriate  - Assess for signs & symptoms of volume excess or deficit  6/23/2024 1940 by  Jackendie Saint-Julien, RN  Outcome: Progressing  6/23/2024 1940 by Jackendie Saint-Julien, RN  Outcome: Progressing  Goal: Glucose maintained within target range  Description: INTERVENTIONS:  - Monitor Blood Glucose as ordered  - Assess for signs and symptoms of hyperglycemia and hypoglycemia  - Administer ordered medications to maintain glucose within target range  - Assess nutritional intake and initiate nutrition service referral as needed  6/23/2024 1940 by Jackendie Saint-Julien, RN  Outcome: Progressing  6/23/2024 1940 by Jackendie Saint-Julien, RN  Outcome: Progressing     Problem: MUSCULOSKELETAL - ADULT  Goal: Maintain or return mobility to safest level of function  Description: INTERVENTIONS:  - Assess patient's ability to carry out ADLs; assess patient's baseline for ADL function and identify physical deficits which impact ability to perform ADLs (bathing, care of mouth/teeth, toileting, grooming, dressing, etc.)  - Assess/evaluate cause of self-care deficits   - Assess range of motion  - Assess patient's mobility  - Assess patient's need for assistive devices and provide as appropriate  - Encourage maximum independence but intervene and supervise when necessary  - Involve family in performance of ADLs  - Assess for home care needs following discharge   - Consider OT consult to assist with ADL evaluation and planning for discharge  - Provide patient education as appropriate  6/23/2024 1940 by Jackendie Saint-Julien, RN  Outcome: Progressing  6/23/2024 1940 by Jackendie Saint-Julien, RN  Outcome: Progressing  Goal: Maintain proper alignment of affected body part  Description: INTERVENTIONS:  - Support, maintain and protect limb and body alignment  - Provide patient/ family with appropriate education  6/23/2024 1940 by Jackendie Saint-Julien, RN  Outcome: Progressing  6/23/2024 1940 by Jackendie Saint-Julien, RN  Outcome: Progressing

## 2024-06-24 ENCOUNTER — APPOINTMENT (INPATIENT)
Dept: NON INVASIVE DIAGNOSTICS | Facility: HOSPITAL | Age: 44
DRG: 673 | End: 2024-06-24
Payer: COMMERCIAL

## 2024-06-24 ENCOUNTER — APPOINTMENT (INPATIENT)
Dept: RADIOLOGY | Facility: HOSPITAL | Age: 44
DRG: 673 | End: 2024-06-24
Payer: COMMERCIAL

## 2024-06-24 ENCOUNTER — TELEPHONE (OUTPATIENT)
Dept: HEMATOLOGY ONCOLOGY | Facility: CLINIC | Age: 44
End: 2024-06-24

## 2024-06-24 LAB
ABO GROUP BLD BPU: NORMAL
ANION GAP SERPL CALCULATED.3IONS-SCNC: 12 MMOL/L (ref 4–13)
ATRIAL RATE: 55 BPM
BASOPHILS # BLD AUTO: 0.06 THOUSANDS/ÂΜL (ref 0–0.1)
BASOPHILS NFR BLD AUTO: 1 % (ref 0–1)
BPU ID: NORMAL
BUN SERPL-MCNC: 47 MG/DL (ref 5–25)
CALCIUM SERPL-MCNC: 8.4 MG/DL (ref 8.4–10.2)
CHEST PAIN STATEMENT: NORMAL
CHLORIDE SERPL-SCNC: 94 MMOL/L (ref 96–108)
CO2 SERPL-SCNC: 25 MMOL/L (ref 21–32)
CREAT SERPL-MCNC: 10.79 MG/DL (ref 0.6–1.3)
CROSSMATCH: NORMAL
EOSINOPHIL # BLD AUTO: 0.34 THOUSAND/ÂΜL (ref 0–0.61)
EOSINOPHIL NFR BLD AUTO: 5 % (ref 0–6)
ERYTHROCYTE [DISTWIDTH] IN BLOOD BY AUTOMATED COUNT: 13.9 % (ref 11.6–15.1)
GFR SERPL CREATININE-BSD FRML MDRD: 5 ML/MIN/1.73SQ M
GLUCOSE SERPL-MCNC: 103 MG/DL (ref 65–140)
GLUCOSE SERPL-MCNC: 114 MG/DL (ref 65–140)
GLUCOSE SERPL-MCNC: 84 MG/DL (ref 65–140)
GLUCOSE SERPL-MCNC: 87 MG/DL (ref 65–140)
GLUCOSE SERPL-MCNC: 92 MG/DL (ref 65–140)
HCT VFR BLD AUTO: 21.9 % (ref 36.5–49.3)
HGB BLD-MCNC: 7.1 G/DL (ref 12–17)
IMM GRANULOCYTES # BLD AUTO: 0.02 THOUSAND/UL (ref 0–0.2)
IMM GRANULOCYTES NFR BLD AUTO: 0 % (ref 0–2)
LYMPHOCYTES # BLD AUTO: 0.94 THOUSANDS/ÂΜL (ref 0.6–4.47)
LYMPHOCYTES NFR BLD AUTO: 14 % (ref 14–44)
MAX DIASTOLIC BP: 93 MMHG
MAX HR PERCENT: 51 %
MAX HR: 92 BPM
MAX PREDICTED HEART RATE: 177 BPM
MCH RBC QN AUTO: 29 PG (ref 26.8–34.3)
MCHC RBC AUTO-ENTMCNC: 32.4 G/DL (ref 31.4–37.4)
MCV RBC AUTO: 89 FL (ref 82–98)
MISCELLANEOUS LAB TEST RESULT: NORMAL
MONOCYTES # BLD AUTO: 0.69 THOUSAND/ÂΜL (ref 0.17–1.22)
MONOCYTES NFR BLD AUTO: 10 % (ref 4–12)
NEUTROPHILS # BLD AUTO: 4.63 THOUSANDS/ÂΜL (ref 1.85–7.62)
NEUTS SEG NFR BLD AUTO: 70 % (ref 43–75)
NRBC BLD AUTO-RTO: 0 /100 WBCS
NUC STRESS EJECTION FRACTION: 58 %
P AXIS: 70 DEGREES
PLATELET # BLD AUTO: 301 THOUSANDS/UL (ref 149–390)
PMV BLD AUTO: 8.3 FL (ref 8.9–12.7)
POTASSIUM SERPL-SCNC: 5.2 MMOL/L (ref 3.5–5.3)
PR INTERVAL: 186 MS
PROTOCOL NAME: NORMAL
QRS AXIS: 2 DEGREES
QRSD INTERVAL: 84 MS
QT INTERVAL: 442 MS
QTC INTERVAL: 422 MS
RATE PRESSURE PRODUCT: NORMAL
RBC # BLD AUTO: 2.45 MILLION/UL (ref 3.88–5.62)
REASON FOR TERMINATION: NORMAL
SL CV REST NUCLEAR ISOTOPE DOSE: 16.3 MCI
SL CV STRESS NUCLEAR ISOTOPE DOSE: 49.2 MCI
SL CV STRESS RECOVERY BP: NORMAL MMHG
SL CV STRESS RECOVERY HR: 68 BPM
SL CV STRESS RECOVERY O2 SAT: 94 %
SODIUM SERPL-SCNC: 131 MMOL/L (ref 135–147)
STRESS ANGINA INDEX: 0
STRESS BASELINE BP: NORMAL MMHG
STRESS BASELINE HR: 52 BPM
STRESS O2 SAT REST: 93 %
STRESS PEAK HR: 63 BPM
STRESS POST ESTIMATED WORKLOAD: 1 METS
STRESS POST EXERCISE DUR MIN: 1 MIN
STRESS POST EXERCISE DUR MIN: 1 MIN
STRESS POST EXERCISE DUR SEC: 0 SEC
STRESS POST O2 SAT PEAK: 93 %
STRESS POST PEAK BP: 161 MMHG
STRESS POST PEAK HR: 92 BPM
STRESS POST PEAK SYSTOLIC BP: 169 MMHG
STRESS/REST PERFUSION RATIO: 1.1
T WAVE AXIS: 20 DEGREES
TARGET HR FORMULA: NORMAL
TEST INDICATION: NORMAL
UNIT DISPENSE STATUS: NORMAL
UNIT PRODUCT CODE: NORMAL
UNIT PRODUCT VOLUME: 350 ML
UNIT RH: NORMAL
VENTRICULAR RATE: 55 BPM
WBC # BLD AUTO: 6.68 THOUSAND/UL (ref 4.31–10.16)

## 2024-06-24 PROCEDURE — 99232 SBSQ HOSP IP/OBS MODERATE 35: CPT | Performed by: INTERNAL MEDICINE

## 2024-06-24 PROCEDURE — 93017 CV STRESS TEST TRACING ONLY: CPT

## 2024-06-24 PROCEDURE — 78452 HT MUSCLE IMAGE SPECT MULT: CPT

## 2024-06-24 PROCEDURE — 93010 ELECTROCARDIOGRAM REPORT: CPT | Performed by: INTERNAL MEDICINE

## 2024-06-24 PROCEDURE — 93016 CV STRESS TEST SUPVJ ONLY: CPT | Performed by: INTERNAL MEDICINE

## 2024-06-24 PROCEDURE — 80048 BASIC METABOLIC PNL TOTAL CA: CPT | Performed by: STUDENT IN AN ORGANIZED HEALTH CARE EDUCATION/TRAINING PROGRAM

## 2024-06-24 PROCEDURE — 93018 CV STRESS TEST I&R ONLY: CPT | Performed by: INTERNAL MEDICINE

## 2024-06-24 PROCEDURE — A9502 TC99M TETROFOSMIN: HCPCS

## 2024-06-24 PROCEDURE — 99233 SBSQ HOSP IP/OBS HIGH 50: CPT | Performed by: STUDENT IN AN ORGANIZED HEALTH CARE EDUCATION/TRAINING PROGRAM

## 2024-06-24 PROCEDURE — 97530 THERAPEUTIC ACTIVITIES: CPT

## 2024-06-24 PROCEDURE — 85025 COMPLETE CBC W/AUTO DIFF WBC: CPT | Performed by: STUDENT IN AN ORGANIZED HEALTH CARE EDUCATION/TRAINING PROGRAM

## 2024-06-24 PROCEDURE — 82948 REAGENT STRIP/BLOOD GLUCOSE: CPT

## 2024-06-24 PROCEDURE — 78452 HT MUSCLE IMAGE SPECT MULT: CPT | Performed by: INTERNAL MEDICINE

## 2024-06-24 RX ORDER — REGADENOSON 0.08 MG/ML
0.4 INJECTION, SOLUTION INTRAVENOUS ONCE
Status: COMPLETED | OUTPATIENT
Start: 2024-06-24 | End: 2024-06-24

## 2024-06-24 RX ORDER — ONDANSETRON 2 MG/ML
4 INJECTION INTRAMUSCULAR; INTRAVENOUS DAILY
Status: DISCONTINUED | OUTPATIENT
Start: 2024-06-25 | End: 2024-06-26 | Stop reason: HOSPADM

## 2024-06-24 RX ADMIN — OXYCODONE HYDROCHLORIDE 5 MG: 5 TABLET ORAL at 17:55

## 2024-06-24 RX ADMIN — OXYCODONE HYDROCHLORIDE 5 MG: 5 TABLET ORAL at 08:04

## 2024-06-24 RX ADMIN — HYDROMORPHONE HYDROCHLORIDE 0.5 MG: 1 INJECTION, SOLUTION INTRAMUSCULAR; INTRAVENOUS; SUBCUTANEOUS at 10:31

## 2024-06-24 RX ADMIN — HEPARIN SODIUM 5000 UNITS: 5000 INJECTION, SOLUTION INTRAVENOUS; SUBCUTANEOUS at 21:10

## 2024-06-24 RX ADMIN — HEPARIN SODIUM 5000 UNITS: 5000 INJECTION, SOLUTION INTRAVENOUS; SUBCUTANEOUS at 05:28

## 2024-06-24 RX ADMIN — DOCUSATE SODIUM 100 MG: 100 CAPSULE, LIQUID FILLED ORAL at 08:05

## 2024-06-24 RX ADMIN — CYANOCOBALAMIN TAB 500 MCG 1000 MCG: 500 TAB at 08:05

## 2024-06-24 RX ADMIN — HYDROMORPHONE HYDROCHLORIDE 0.5 MG: 1 INJECTION, SOLUTION INTRAMUSCULAR; INTRAVENOUS; SUBCUTANEOUS at 05:33

## 2024-06-24 RX ADMIN — HYDROMORPHONE HYDROCHLORIDE 0.5 MG: 1 INJECTION, SOLUTION INTRAMUSCULAR; INTRAVENOUS; SUBCUTANEOUS at 14:29

## 2024-06-24 RX ADMIN — TORSEMIDE 40 MG: 20 TABLET ORAL at 08:05

## 2024-06-24 RX ADMIN — OXYCODONE HYDROCHLORIDE 5 MG: 5 TABLET ORAL at 00:27

## 2024-06-24 RX ADMIN — HYDROMORPHONE HYDROCHLORIDE 0.5 MG: 1 INJECTION, SOLUTION INTRAMUSCULAR; INTRAVENOUS; SUBCUTANEOUS at 22:52

## 2024-06-24 RX ADMIN — ATORVASTATIN CALCIUM 80 MG: 80 TABLET, FILM COATED ORAL at 08:05

## 2024-06-24 RX ADMIN — HYDROMORPHONE HYDROCHLORIDE 0.5 MG: 1 INJECTION, SOLUTION INTRAMUSCULAR; INTRAVENOUS; SUBCUTANEOUS at 19:49

## 2024-06-24 RX ADMIN — REGADENOSON 0.4 MG: 0.08 INJECTION, SOLUTION INTRAVENOUS at 09:25

## 2024-06-24 RX ADMIN — ISOSORBIDE MONONITRATE 30 MG: 30 TABLET, EXTENDED RELEASE ORAL at 08:05

## 2024-06-24 RX ADMIN — HEPARIN SODIUM 5000 UNITS: 5000 INJECTION, SOLUTION INTRAVENOUS; SUBCUTANEOUS at 14:36

## 2024-06-24 RX ADMIN — NYSTATIN 1 APPLICATION: 100000 POWDER TOPICAL at 17:56

## 2024-06-24 RX ADMIN — Medication 5000 UNITS: at 08:05

## 2024-06-24 RX ADMIN — NYSTATIN 1 APPLICATION: 100000 POWDER TOPICAL at 08:06

## 2024-06-24 RX ADMIN — METOPROLOL SUCCINATE 25 MG: 25 TABLET, EXTENDED RELEASE ORAL at 08:05

## 2024-06-24 NOTE — PROGRESS NOTES
NEPHROLOGY HOSPITAL PROGRESS NOTE   Joaquin Frankel 44 y.o. male MRN: [id number]  Unit/Bed#: [address] [place] [address] Encounter: [id number]  Reason for Consult: CLOVIS    ASSESSMENT and PLAN:    44-year-old male with a past medical history of CKD stage III, diabetes, hypertension who initially presents with shortness of breath and chest pain.  Nephrology on board for acute kidney injury.    1-acute kidney injury-present on admission    - Prior baseline creatinine 1.3 to 1.6 mg/dL with episodes of acute kidney injury  - Admission creatinine 6 mg/dL  - Peak creatinine 10.4 mg/dL  - Initially started on intermittent dialysis on June 7.  Subsequently on June 9 the patient was transition to CRRT temporarily due to hypotension.  And then transition back to intermittent hemodialysis on Tori 10 due to hemodynamic stability.    Serological workup  - ANCA-negative  - Anti-GBM negative  - JOSE ROBERTO negative  - Anti-double-stranded DNA negative  - Kappa/lambda ratio normal limits  - IgG kappa monoclonal band on SPEP and UPEP  - C3 and C4 were normal  - Beta-2 microglobulin was elevated    Renal imaging-no evidence of hydronephrosis    Dialysis access-tunneled dialysis catheter placed June 14    Etiology-bone marrow biopsy results are pending.  There is concern for possible multiple myeloma.  If confirmed, then we will hold on renal biopsy.  If bone marrow biopsy is unrevealing, patient may ultimately require renal biopsy but currently is not safe for renal biopsy due to significant volume overload and may need to complete as outpatient    Plan  - Dialysis TTS schedule with dialysis tomorrow and then will plan for dialysis Wednesday if remains inpatient  - Noted patient will be on dialysis as outpatient at [facility] schedule  - Monitor for renal recovery  - Pending bone marrow biopsy  - Low potassium diet  - Reviewed case with primary team attending and case management.  Plan for dialysis tomorrow from renal  standpoint    2-electrolytes-monitor with dialysis with the exception of borderline hyponatremia intermittently.  Monitor with dialysis and ultrafiltration.  Borderline hyperkalemia.  Monitor with diuretics.  Change diet to low potassium diet when off of stress dose diet    3-acid/base-appropriate to monitor with dialysis    6-gcpcxc-ffnzxk-up bone marrow biopsy.  SPEP and UPEP positive for IgG kappa monoclonal bands.  On IV iron    - Defer JOSSELYN to hematology team    5-hypertension-monitor with ultrafiltration and current regimen.  Further management in progress by cardiology team.  Adding Imdur today.    6-MBD-secondary hyperparathyroidism with hyperphosphatemia.  Monitor with current regimen    7-chest pain with exertion-per primary team.  Stress test today.    SUBJECTIVE / 24H INTERVAL HISTORY:  No SOB, but on exertion has SOB. -160. Weakness in AM    OBJECTIVE:  Current Weight: Weight - Scale: (!) 182 kg (401 lb 12.8 oz)  Vitals:    06/23/24 2300 06/23/24 2302 06/24/24 0600 06/24/24 0734   BP: 145/68 145/68  164/81   Pulse: 60 63  58   Resp: 16   19   Temp: 98.2 °F (36.8 °C) 98.2 °F (36.8 °C)  98 °F (36.7 °C)   TempSrc: Oral      SpO2: 95% 96%  96%   Weight:   (!) 182 kg (401 lb 12.8 oz)    Height:           Intake/Output Summary (Last 24 hours) at 6/24/2024 1142  Last data filed at 6/24/2024 0901  Gross per 24 hour   Intake 561.67 ml   Output --   Net 561.67 ml     General: NAD  Skin: no rash  Eyes: anicteric sclera  ENT: moist mucous membrane  Neck: supple  Chest: CTA b/l, no ronchii, no wheeze, no rubs, no rales  CVS: s1s2, no murmur, no gallop, no rub  Abdomen: soft, nontender, nl sounds  Extremities: 3 +  edema LE b/l  : no lewis  Neuro: AAOX3  Psych: normal affect    Medications:    Current Facility-Administered Medications:     atorvastatin (LIPITOR) tablet 80 mg, 80 mg, Oral, Daily, PETRA Briceño, 80 mg at 06/24/24 0805    Cholecalciferol (VITAMIN D3) tablet 5,000 Units, 5,000  Units, Oral, Daily, PETRA Briceño, 5,000 Units at 06/24/24 0805    cyanocobalamin (VITAMIN B-12) tablet 1,000 mcg, 1,000 mcg, Oral, Daily, PETRA Briceño, 1,000 mcg at 06/24/24 0805    docusate sodium (COLACE) capsule 100 mg, 100 mg, Oral, BID, PETRA Briceño, 100 mg at 06/24/24 0805    heparin (porcine) subcutaneous injection 5,000 Units, 5,000 Units, Subcutaneous, Q8H SHAZIA, PETRA Briceño, 5,000 Units at 06/24/24 0528    HYDROmorphone (DILAUDID) injection 0.5 mg, 0.5 mg, Intravenous, Q3H PRN, Alyssa Levy MD, 0.5 mg at 06/24/24 1031    insulin lispro (HumALOG/ADMELOG) 100 units/mL subcutaneous injection 1-6 Units, 1-6 Units, Subcutaneous, HS, PETRA Briceño, 1 Units at 06/06/24 2150    insulin lispro (HumALOG/ADMELOG) 100 units/mL subcutaneous injection 2-12 Units, 2-12 Units, Subcutaneous, TID AC, 2 Units at 06/20/24 1618 **AND** Fingerstick Glucose (POCT), , , TID AC, PETRA Briceño    iron sucrose (VENOFER) 200 mg in sodium chloride 0.9 % 100 mL IVPB, 200 mg, Intravenous, Once per day on Monday Wednesday Friday, Alyssa Levy MD, Last Rate: 100 mL/hr at 06/23/24 1323, 200 mg at 06/23/24 1323    isosorbide mononitrate (IMDUR) 24 hr tablet 30 mg, 30 mg, Oral, Daily, PETRA Orlando, 30 mg at 06/24/24 0805    lidocaine (LIDODERM) 5 % patch 1 patch, 1 patch, Topical, Daily, PETRA Briceño, 1 patch at 06/17/24 0857    metoprolol succinate (TOPROL-XL) 24 hr tablet 25 mg, 25 mg, Oral, Daily, Jose De Jesus Cardozo PA-C, 25 mg at 06/24/24 0805    midodrine (PROAMATINE) tablet 5 mg, 5 mg, Oral, Before Dialysis, PETRA Briceño, 5 mg at 06/11/24 1317    nitroglycerin (NITROSTAT) SL tablet 0.4 mg, 0.4 mg, Sublingual, Q5 Min PRN, Alyssa Levy MD    nystatin (MYCOSTATIN) powder 1 Application, 1 Application, Topical, BID, PETRA Briceño, 1 Application at 06/24/24 0806     ondansetron (ZOFRAN) injection 4 mg, 4 mg, Intravenous, Q6H PRN, PETRA Briceño, 4 mg at 06/22/24 1015    oxyCODONE (ROXICODONE) IR tablet 5 mg, 5 mg, Oral, Q6H PRN, Dennis Waterman MD, 5 mg at 06/24/24 0804    polyethylene glycol (MIRALAX) packet 17 g, 17 g, Oral, Daily PRN, PETRA Briceño    senna (SENOKOT) tablet 8.6 mg, 1 tablet, Oral, HS, PETRA Briceño, 8.6 mg at 06/23/24 2253    torsemide (DEMADEX) tablet 40 mg, 40 mg, Oral, Daily, Kylie Kendall MD, 40 mg at 06/24/24 0805    Laboratory Results:  Results from last 7 days   Lab Units 06/24/24  0527 06/23/24  0619 06/22/24  0505 06/21/24  0531 06/20/24  0538 06/19/24  0307 06/19/24  0301 06/18/24  0603   WBC Thousand/uL 6.68 6.79 7.82 8.30 9.65  --  12.82* 12.61*   HEMOGLOBIN g/dL 7.1* 7.0* 6.8* 6.6* 7.1*  --  8.0* 7.5*   HEMATOCRIT % 21.9* 22.0* 21.8* 21.0* 22.7*  --  25.2* 24.2*   PLATELETS Thousands/uL 301 344 310 340 377  --  405* 378   POTASSIUM mmol/L 5.2 4.6 5.0 4.8 5.0 4.7  --  5.0   CHLORIDE mmol/L 94* 92* 91* 92* 91* 92*  --  91*   CO2 mmol/L 25 26 26 26 24 25  --  23   BUN mg/dL 47* 34* 54* 44* 57* 48*  --  66*   CREATININE mg/dL 10.79* 8.97* 11.31* 9.46* 11.36* 9.30*  --  11.18*   CALCIUM mg/dL 8.4 8.4 8.7 8.2* 8.5 8.4  --  8.3*

## 2024-06-24 NOTE — TELEPHONE ENCOUNTER
"Soft Intake Form   Patient Details   Joaquin Frankel     1980     Reason For Appointment   Who is Calling? Barb Song   If not patient, Name?  NA   DID YOU CONFIRM INSURANCE WITH PATIENT? E verified, Routed to finance   Who is the Referring Doctor? Amy Merino PA-C   What is the diagnosis? anemia, significant; monoclonal gammopathy identified as IgG kappa   Has this diagnosis been confirmed by a biopsy/surgery?    If yes, what is the date it was done? BMBX taken on 6/12   Biopsy done at [facility]?  If not, where was it done? Yes   Was imaging done, and was it done at [facility]?  If not, where was it done? Yes-[facility]   Have you been seen by another Oncologist?  If so, who and where (name of facility, city and state) No   For 2nd Opinions Only: Are you currently undergoing treatment, or are you scheduled to start treatment?  If yes, name of facility, city and state  NA   For \"History Of\" only: Have you completed treatment? NA    Have you had Genetic Testing done in the past?  If so, advise to bring test results to their visit NA   Record Gathering Information   Did you advise to have records faxed to [phone]?  NA   Did you advise to have disks sent to the proper address with imaging? (\"History of\" Patients)  5 years of imaging for breast patients-Mammos, US etc NA   Scheduling Information   Did you send new patient paperwork?  Email or mail? NA   What is the best call back number?   (If the [facility] is calling, please use their number) NA   Miscellaneous Information      The patient is scheduled for his HFU appointment with Dr. Fisher on 7/11 at 0840 in the [place] office.      "

## 2024-06-24 NOTE — PLAN OF CARE
Problem: Prexisting or High Potential for Compromised Skin Integrity  Goal: Skin integrity is maintained or improved  Description: INTERVENTIONS:  - Identify patients at risk for skin breakdown  - Assess and monitor skin integrity  - Assess and monitor nutrition and hydration status  - Monitor labs   - Assess for incontinence   - Turn and reposition patient  - Assist with mobility/ambulation  - Relieve pressure over bony prominences  - Avoid friction and shearing  - Provide appropriate hygiene as needed including keeping skin clean and dry  - Evaluate need for skin moisturizer/barrier cream  - Collaborate with interdisciplinary team   - Patient/family teaching  - Consider wound care consult   Outcome: Progressing     Problem: PAIN - ADULT  Goal: Verbalizes/displays adequate comfort level or baseline comfort level  Description: Interventions:  - Encourage patient to monitor pain and request assistance  - Assess pain using appropriate pain scale  - Administer analgesics based on type and severity of pain and evaluate response  - Implement non-pharmacological measures as appropriate and evaluate response  - Consider cultural and social influences on pain and pain management  - Notify physician/advanced practitioner if interventions unsuccessful or patient reports new pain  Outcome: Progressing     Problem: INFECTION - ADULT  Goal: Absence or prevention of progression during hospitalization  Description: INTERVENTIONS:  - Assess and monitor for signs and symptoms of infection  - Monitor lab/diagnostic results  - Monitor all insertion sites, i.e. indwelling lines, tubes, and drains  - [place] appropriate cooling/warming therapies per order  - Administer medications as ordered  - Instruct and encourage patient and family to use good hand hygiene technique  - Identify and instruct in appropriate isolation precautions for identified infection/condition  Outcome: Progressing     Problem: SAFETY ADULT  Goal: Patient will  remain free of falls  Description: INTERVENTIONS:  - Educate patient/family on patient safety including physical limitations  - Instruct patient to call for assistance with activity   - Consult OT/PT to assist with strengthening/mobility   - Keep Call bell within reach  - Keep bed low and locked with side rails adjusted as appropriate  - Keep care items and personal belongings within reach  - Initiate and maintain comfort rounds  - Make Fall Risk Sign visible to staff  - Apply yellow socks and bracelet for high fall risk patients  - Consider moving patient to room near nurses station  Outcome: Progressing  Goal: Maintain or return to baseline ADL function  Description: INTERVENTIONS:  -  Assess patient's ability to carry out ADLs; assess patient's baseline for ADL function and identify physical deficits which impact ability to perform ADLs (bathing, care of mouth/teeth, toileting, grooming, dressing, etc.)  - Assess/evaluate cause of self-care deficits   - Assess range of motion  - Assess patient's mobility; develop plan if impaired  - Assess patient's need for assistive devices and provide as appropriate  - Encourage maximum independence but intervene and supervise when necessary  - Involve family in performance of ADLs  - Assess for home care needs following discharge   - Consider OT consult to assist with ADL evaluation and planning for discharge  - Provide patient education as appropriate  Outcome: Progressing  Goal: Maintains/Returns to pre admission functional level  Description: INTERVENTIONS:  - Perform AM-PAC 6 Click Basic Mobility/ Daily Activity assessment daily.  - Set and communicate daily mobility goal to care team and patient/family/caregiver.   - Collaborate with rehabilitation services on mobility goals if consulted  - Out of bed for toileting  - Record patient progress and toleration of activity level   Outcome: Progressing     Problem: CARDIOVASCULAR - ADULT  Goal: Maintains optimal cardiac output  and hemodynamic stability  Description: INTERVENTIONS:  - Monitor I/O, vital signs and rhythm  - Monitor for S/S and trends of decreased cardiac output  - Administer and titrate ordered vasoactive medications to optimize hemodynamic stability  - Assess quality of pulses, skin color and temperature  - Assess for signs of decreased coronary artery perfusion  - Instruct patient to report change in severity of symptoms  Outcome: Progressing  Goal: Absence of cardiac dysrhythmias or at baseline rhythm  Description: INTERVENTIONS:  - Continuous cardiac monitoring, vital signs, obtain 12 lead EKG if ordered  - Administer antiarrhythmic and heart rate control medications as ordered  - Monitor electrolytes and administer replacement therapy as ordered  Outcome: Progressing     Problem: RESPIRATORY - ADULT  Goal: Achieves optimal ventilation and oxygenation  Description: INTERVENTIONS:  - Assess for changes in respiratory status  - Assess for changes in mentation and behavior  - Position to facilitate oxygenation and minimize respiratory effort  - Oxygen administered by appropriate delivery if ordered  - Initiate smoking cessation education as indicated  - Encourage broncho-pulmonary hygiene including cough, deep breathe, Incentive Spirometry  - Assess the need for suctioning and aspirate as needed  - Assess and instruct to report SOB or any respiratory difficulty  - Respiratory Therapy support as indicated  Outcome: Progressing     Problem: SKIN/TISSUE INTEGRITY - ADULT  Goal: Incision(s), wounds(s) or drain site(s) healing without S/S of infection  Description: INTERVENTIONS  - Assess and document dressing, incision, wound bed, drain sites and surrounding tissue  - Provide patient and family education  - Perform skin care/dressing changes  Outcome: Progressing  Goal: Skin Integrity remains intact(Skin Breakdown Prevention)  Description: Assess:  -Assess extremities for adequate circulation and sensation     Bed  Management:  -Have minimal linens on bed & keep smooth, unwrinkled  -Change linens as needed when moist or perspiring  -Keep HOB at 45degrees     Toileting:  -Offer bedside commode    Activity:  -Encourage activity and walks on unit  -Encourage or provide ROM exercises   -Use appropriate equipment to lift or move patient in bed    Skin Care:  -Avoid use of baby powder, tape, friction and shearing, hot water or constrictive clothing  -Do not massage red bony areas    Outcome: Progressing     Problem: NEUROSENSORY - ADULT  Goal: Achieves maximal functionality and self care  Description: INTERVENTIONS  - Monitor swallowing and airway patency with patient fatigue and changes in neurological status  - Encourage and assist patient to increase activity and self care.   - Encourage visually impaired, hearing impaired and aphasic patients to use assistive/communication devices  Outcome: Progressing     Problem: METABOLIC, FLUID AND ELECTROLYTES - ADULT  Goal: Electrolytes maintained within normal limits  Description: INTERVENTIONS:  - Monitor labs and assess patient for signs and symptoms of electrolyte imbalances  - Administer electrolyte replacement as ordered  - Monitor response to electrolyte replacements, including repeat lab results as appropriate  - Instruct patient on fluid and nutrition as appropriate  Outcome: Progressing  Goal: Fluid balance maintained  Description: INTERVENTIONS:  - Monitor labs   - Monitor I/O and WT  - Instruct patient on fluid and nutrition as appropriate  - Assess for signs & symptoms of volume excess or deficit  Outcome: Progressing  Goal: Glucose maintained within target range  Description: INTERVENTIONS:  - Monitor Blood Glucose as ordered  - Assess for signs and symptoms of hyperglycemia and hypoglycemia  - Administer ordered medications to maintain glucose within target range  - Assess nutritional intake and initiate nutrition service referral as needed  Outcome: Progressing     Problem:  MUSCULOSKELETAL - ADULT  Goal: Maintain or return mobility to safest level of function  Description: INTERVENTIONS:  - Assess patient's ability to carry out ADLs; assess patient's baseline for ADL function and identify physical deficits which impact ability to perform ADLs (bathing, care of mouth/teeth, toileting, grooming, dressing, etc.)  - Assess/evaluate cause of self-care deficits   - Assess range of motion  - Assess patient's mobility  - Assess patient's need for assistive devices and provide as appropriate  - Encourage maximum independence but intervene and supervise when necessary  - Involve family in performance of ADLs  - Assess for home care needs following discharge   - Consider OT consult to assist with ADL evaluation and planning for discharge  - Provide patient education as appropriate  Outcome: Progressing  Goal: Maintain proper alignment of affected body part  Description: INTERVENTIONS:  - Support, maintain and protect limb and body alignment  - Provide patient/ family with appropriate education  Outcome: Progressing

## 2024-06-24 NOTE — PHYSICAL THERAPY NOTE
"   PT TREATMENT         06/24/24 1421   PT Last Visit   PT Visit Date 06/24/24   Note Type   Note Type Treatment   Pain Assessment   Pain Assessment Tool 0-10   Pain Score 8   Pain Location/Orientation Location: Chest  (Pt reports no pain at rest. Pt reports pain in L upper chest and jaw during/post ambulation)   Restrictions/Precautions   Other Precautions Fall Risk;Pain   General   Chart Reviewed Yes   Cognition   Overall Cognitive Status WFL   Arousal/Participation Alert;Cooperative   Attention Within functional limits   Following Commands Follows all commands and directions without difficulty   Subjective   Subjective \"I am a little tired\"   Bed Mobility   Supine to Sit 5  Supervision   Additional items HOB elevated;Bedrails;Increased time required   Transfers   Sit to Stand 5  Supervision   Additional items Bedrails   Stand to Sit 5  Supervision   Ambulation/Elevation   Gait pattern Wide DEJON;Foward flexed;Short stride  (Decreased gait speed)   Gait Assistance 5  Supervision   Assistive Device   (Bariatric SPC)   Distance 125 ft   Balance   Static Sitting Good   Static Standing Fair   Ambulatory Fair -   Activity Tolerance   Activity Tolerance Patient limited by fatigue;Patient limited by pain  (Pt limited by SOB, jaw and chest pain)   Nurse Made Aware CHEYENNE Her aware of pts chest pain   Assessment   Prognosis Good   Problem List Decreased strength;Decreased range of motion;Decreased endurance;Impaired balance;Decreased mobility;Pain   Assessment Pt ambulated 125 ft with a bariatric SPC and supervision. Pt reported jaw and L upper chest pain when ambulating. Pt with SOB when ambulating which improved within ~2 mins of rest. O2 saturation throughout and post ambulation was >95%. Pt limited in further ambulation due to pain and SOB.      The patient's AM-PAC Basic Mobility Inpatient Short Form Raw Score is 20. A Raw score of greater than 16 suggests the patient may benefit from discharge to home. Please also " refer to the recommendation of the Physical Therapist for safe discharge planning.        Plan   Treatment/Interventions Functional transfer training;LE strengthening/ROM;Elevations;Therapeutic exercise;Endurance training;Bed mobility;Gait training   Progress Progressing toward goals   PT Frequency Other (Comment)  (5x/week)   Discharge Recommendation   Rehab Resource Intensity Level, PT III (Minimum Resource Intensity)   AM-PAC Basic Mobility Inpatient   Turning in Flat Bed Without Bedrails 4   Lying on Back to Sitting on Edge of Flat Bed Without Bedrails 4   Moving Bed to Chair 3   Standing Up From Chair Using Arms 3   Walk in Room 3   Climb 3-5 Stairs With Railing 3   Basic Mobility Inpatient Raw Score 20   Basic Mobility Standardized Score 43.99   [facility] Highest Level Of Mobility   -HLM Goal 6: Walk 10 steps or more   [facility]-HLM Achieved 7: Walk 25 feet or more   End of Consult   Patient Position at End of Consult Bedside chair;All needs within reach   Licensure   [place] License Number  Joana Vieyra, SPT

## 2024-06-24 NOTE — PROGRESS NOTES
"Progress Note - Cardiology   [facility]     Joaquin Frankel 44 y.o. male MRN: [id number]  : 1980  Unit/Bed#: [address] [place] [address]-01 Encounter: [id number]    Assessment and Plan:   1. Chest Pain in a patient with history of coronary artery disease: history of PCI to the left circumflex in , NSTEMI in 2020 and PCI the RCA 2020.    -   Discomfort occurs with activity and resolved with rest     -   continue Lipitor 80 mg once a day    -   continue Toprol-XL 25 mg daily    -   will add Imdur 30 mg daily    -   will schedule patient for Lexiscan nuclear stress test today.     2. CLOVIS: patient presented with creatinine of six on day of admission 6/3/2024 and peaked at 10.4.    -   Started on CRRT transitioned to intermittent hemodialysis per nephrology during hospitalization    -   has right IJ primary Placed on 2024    -   managed per nephrology    3. Anemia: hemoglobin around 7    -   most likely due to CLOVIS    -   would recommend optimization of hemoglobin prior to any invasive cardiac procedure     4. Diabetes: hemoglobin A1c 5.7    -    managed per primary team     5. Hypertension: patient had been on Coreg but was transition to Toprol-XL due to hypotension.    -   Procardia XL 30 mg daily started on 2024 by nephrology, blood pressure greatly improved    -   continue to monitor blood pressure     6. Chronic lymphedema: venous duplex of lower extremities currently pending    -   would benefit from lymphedema pumps and therapy     7. Morbid obesity: BMI 62       Subjective / Objective:   Patient seen and examined. He is for Lexiscan nuclear stress test today to evaluate for ischemia. Patient is noted to be anemic and discussed with primary team.    Vitals: Blood pressure 164/81, pulse 58, temperature 98 °F (36.7 °C), resp. rate 19, height 5' 7\" (1.702 m), weight (!) 182 kg (401 lb 12.8 oz), SpO2 96%.  Vitals:    24 0600 24 06   Weight: (!) 180 kg (397 lb) " (!) 182 kg (401 lb 12.8 oz)     Body mass index is 62.93 kg/m².  BP Readings from Last 3 Encounters:   06/24/24 164/81   05/22/24 134/74   05/16/24 144/72     Orthostatic Blood Pressures      Flowsheet Row Most Recent Value   Blood Pressure 164/81 filed at 06/24/2024 0734   Patient Position - Orthostatic VS Lying filed at 06/22/2024 1631          I/O         06/22 0701  06/23 0700 06/23 0701  06/24 0700 06/24 0701  06/25 0700    I.V. (mL/kg) 500 (2.8)      Blood  321.7     Total Intake(mL/kg) 500 (2.8) 321.7 (1.8)     Urine (mL/kg/hr) 50 (0)      Other 4500      Total Output 4550      Net -4050 +321.7                  Invasive Devices       Peripheral Intravenous Line  Duration             Peripheral IV 06/23/24 Distal;Right;Upper;Ventral (anterior) Arm <1 day              Hemodialysis Catheter  Duration             HD Permanent Double Catheter 9 days                      Intake/Output Summary (Last 24 hours) at 6/24/2024 0917  Last data filed at 6/23/2024 2300  Gross per 24 hour   Intake 321.67 ml   Output --   Net 321.67 ml         Physical Exam:   Physical Exam  Vitals and nursing note reviewed.   Constitutional:       Appearance: Normal appearance. He is morbidly obese.   HENT:      Right Ear: External ear normal.      Left Ear: External ear normal.   Eyes:      General: No scleral icterus.        Right eye: No discharge.         Left eye: No discharge.   Cardiovascular:      Rate and Rhythm: Normal rate and regular rhythm.      Pulses: Normal pulses.      Heart sounds: Normal heart sounds.   Pulmonary:      Effort: Pulmonary effort is normal. No respiratory distress.      Breath sounds: Normal breath sounds.   Abdominal:      General: Bowel sounds are normal. There is no distension.      Palpations: Abdomen is soft.   Musculoskeletal:      Right lower leg: Edema present.      Left lower leg: Edema present.   Skin:     General: Skin is warm and dry.      Capillary Refill: Capillary refill takes less than 2  seconds.   Neurological:      General: No focal deficit present.      Mental Status: He is alert and oriented to person, place, and time. Mental status is at baseline.   Psychiatric:         Mood and Affect: Mood normal.         Behavior: Behavior is cooperative.           Medications/ Allergies:     Current Facility-Administered Medications   Medication Dose Route Frequency Provider Last Rate    atorvastatin  80 mg Oral Daily [facility] PETRA      Cholecalciferol  5,000 Units Oral Daily [facility] Bear, CRLOKI      cyanocobalamin  1,000 mcg Oral Daily [facility] Bear, CRNP      docusate sodium  100 mg Oral BID [facility] Bear, PETRA      heparin (porcine)  5,000 Units Subcutaneous Q8H [facility] Bear, PETRA      HYDROmorphone  0.5 mg Intravenous Q3H PRN Alyssa Levy MD      insulin lispro  1-6 Units Subcutaneous HS [facility] Bear, PETRA      insulin lispro  2-12 Units Subcutaneous TID AC [facility] PETRA Sifuentes      iron sucrose  200 mg Intravenous Once per day on Monday Wednesday Friday Alyssa Levy  mg (06/23/24 1323)    isosorbide mononitrate  30 mg Oral Daily PETRA [place]      lidocaine  1 patch Topical Daily [facility] PETRA Sifuentes      metoprolol succinate  25 mg Oral Daily Jose De Jesus Cardozo PA-C      midodrine  5 mg Oral Before Dialysis [facility] Bear, PETRA      nitroglycerin  0.4 mg Sublingual Q5 Min PRN Alyssa Levy MD      nystatin  1 Application Topical BID [facility] PETRA Sifuentes      ondansetron  4 mg Intravenous Q6H PRN [facility] Bear, PETRA      oxyCODONE  5 mg Oral Q6H PRN Dennis Waterman MD      polyethylene glycol  17 g Oral Daily PRN [facility] PETRA Sifuentes      senna  1 tablet Oral HS [facility] Bear, PETRA      torsemide  40 mg Oral Daily Kylie Kendall MD       HYDROmorphone, 0.5 mg, Q3H PRN  midodrine, 5 mg, Before Dialysis  nitroglycerin, 0.4 mg,  Q5 Min PRN  ondansetron, 4 mg, Q6H PRN  oxyCODONE, 5 mg, Q6H PRN  polyethylene glycol, 17 g, Daily PRN      Allergies   Allergen Reactions    Keflex [Cephalexin] Facial Swelling and Lip Swelling    Amoxicillin Hives     childhood       VTE Pharmacologic Prophylaxis:   Sequential compression device (Venodyne)     Labs:   Troponins:  Results from last 7 days   Lab Units 06/21/24 2037 06/21/24  1839   HSTNI D2 ng/L  --  2   HSTNI D4 ng/L 3  --      CBC with diff:  Results from last 7 days   Lab Units 06/24/24  0527 06/23/24  0619 06/22/24  0505 06/21/24  0531 06/20/24  0538 06/19/24  0301 06/18/24  0603   WBC Thousand/uL 6.68 6.79 7.82 8.30 9.65 12.82* 12.61*   HEMOGLOBIN g/dL 7.1* 7.0* 6.8* 6.6* 7.1* 8.0* 7.5*   HEMATOCRIT % 21.9* 22.0* 21.8* 21.0* 22.7* 25.2* 24.2*   MCV fL 89 91 91 90 91 88 90   PLATELETS Thousands/uL 301 344 310 340 377 405* 378   RBC Million/uL 2.45* 2.41* 2.41* 2.33* 2.49* 2.85* 2.68*   MCH pg 29.0 29.0 28.2 28.3 28.5 28.1 28.0   MCHC g/dL 32.4 31.8 31.2* 31.4 31.3* 31.7 31.0*   RDW % 13.9 14.2 14.1 14.1 14.3 14.3 14.3   MPV fL 8.3* 8.8* 8.3* 8.8* 8.7* 8.4* 8.5*   NRBC AUTO /100 WBCs 0 0 0 0 0  --   --      CMP:  Results from last 7 days   Lab Units 06/24/24  0527 06/23/24  0619 06/22/24  0505 06/21/24  0531 06/20/24  0538 06/19/24  0307 06/18/24  0603   SODIUM mmol/L 131* 128* 127* 128* 128* 129* 127*   POTASSIUM mmol/L 5.2 4.6 5.0 4.8 5.0 4.7 5.0   CHLORIDE mmol/L 94* 92* 91* 92* 91* 92* 91*   CO2 mmol/L 25 26 26 26 24 25 23   ANION GAP mmol/L 12 10 10 10 13 12 13   BUN mg/dL 47* 34* 54* 44* 57* 48* 66*   CREATININE mg/dL 10.79* 8.97* 11.31* 9.46* 11.36* 9.30* 11.18*   CALCIUM mg/dL 8.4 8.4 8.7 8.2* 8.5 8.4 8.3*   EGFR ml/min/1.73sq m 5 6 4 6 4 6 4       Imaging & Testing   I have personally reviewed pertinent reports.     VAS VENOUS DUPLEX - LOWER LIMB BILATERAL    Result Date: 6/17/2024  Narrative:  [facility] REPORT CLINICAL: Indications: Patient presents with worsening bilateral  lower extremity edema x a few weeks. Operative History: 2024-06-14 Right Tunneled central line Coronary angioplasty with stent placement Risk Factors The patient has history of Obesity, HTN, Diabetes, Hyperlipidemia, CAD, CKD, and previous smoking (quit 1-5yrs ago).   CONCLUSION:  Impression:  RIGHT LOWER LIMB: No gross evidence of acute or chronic deep vein thrombosis. No gross evidence of superficial thrombophlebitis noted. Doppler evaluation shows a normal response to augmentation maneuvers. Popliteal, posterior tibial and anterior tibial arterial Doppler waveforms are triphasic  LEFT LOWER LIMB: No gross evidence of acute or chronic deep vein thrombosis. No gross evidence of superficial thrombophlebitis noted. Doppler evaluation shows a normal response to augmentation maneuvers. Popliteal, posterior tibial and anterior tibial arterial Doppler waveforms are triphasic/hyperemic.  Tech Note: There is an echogenic structure located in the left inguinal region measuring approximately 3.31cm suggestive of enlarged lymphatic channels.  Technical findings were faxed to chart. Technically difficult/limited study. Some segments may be poorly visualized on today's exam.  SIGNATURE: Electronically Signed by: ANDREA WINSTON on 2024-06-17 08:12:45 PM    IR tunneled central line placement    Result Date: 6/14/2024  Narrative: PROCEDURE: Nontunneled central venous catheter removal. Tunneled dialysis catheter placement. Procedural Personnel Attending physician(s): Radames Liu MD Pre-procedure diagnosis: Anuric CLOVIS on CKD on HD without evidence of renal recovery Post-procedure diagnosis: Same Clinical History: 44-year-old man with history of acute kidney injury on pre-existing chronic kidney disease, who had a temporary dialysis catheter placed by me on 6/6/2024. On 6/10/2024, a small tear/hole was noted in the catheter I had placed, and that catheter was removed. The critical care service placed a new temporary  dialysis catheter on that date. He has had no evidence of renal recovery and requires transition to a tunneled dialysis catheter. PROCEDURE SUMMARY: - Removal of nontunneled central venous catheter - Right internal jugular vein access under real-time ultrasound guidance - Tunneled dialysis catheter placement PROCEDURE DETAILS: Pre-procedure Consent: Informed consent for the procedure including risks, benefits and alternatives was obtained and time-out was performed prior to the procedure. Preparation: The site was prepared and draped using maximal sterile barrier technique including cutaneous antisepsis. Anesthesia/sedation Level of anesthesia/sedation: Moderate sedation (conscious sedation) Anesthesia/sedation administered by: Independent trained observer under attending supervision with continuous monitoring of the patient's level of consciousness and physiologic status Total intra-service sedation time: 1 hour Technique and Findings: Initial fluoroscopic image demonstrated a temporary dialysis catheter in place, with tip terminating in the mid superior vena cava. The access trajectory was not amenable to conversion to a tunneled catheter. The temporary dialysis catheter was removed and hemostasis obtained with gentle manual pressure. Ultrasound demonstrated a widely patent right internal jugular vein, appropriate for access. Local anesthetic was administered. A dermatotomy was made. Under real-time ultrasound guidance, a 21-gauge micropuncture needle was advanced into the right internal jugular vein from a lateral approach. A permanent image was saved. A microwire was advanced and the needle exchanged for the micropuncture transitional dilator. This access was secured with a flow switch. An appropriate length tunneled dialysis catheter was selected. Local anesthetic was administered along the planned tunnel tract. A dermatotomy was made on the chest. The catheter was tunneled from the chest incision to the right  neck incision. Attention was returned to the vascular access. The microwire was exchanged for a 0.035 inch guidewire. The access site was serially dilated and a peel-away sheath was placed. The catheter was inserted via the sheath, which was then peeled away. Completion image demonstrated that the tip terminated in the right atrium. The catheter was checked and both lumens provided brisk flow for dialysis. The catheter was secured with nonabsorbable suture, both lumens flushed and capped per protocol, and a sterile dressing was applied. Contrast None Radiation Dose Fluoroscopy time: 1.1 min Reference air kerma: 20 mGy Additional Details Specimens removed: None Estimated blood loss (mL): Less than 10 Complications: No immediate complications.     Impression: Successful placement of 15.5 Mozambican by 32 cm right chest/right internal jugular vein tunneled hemodialysis catheter. The tip terminates in the right atrium. The catheter is ready for immediate use. Workstation performed: [id number]     US bedside procedure    Result Date: 6/13/2024  Narrative: [id number]    IR biopsy bone marrow    Result Date: 6/13/2024  Narrative: PROCEDURE: CT-guided bone marrow biopsy and bone marrow aspiration STAFF: Geovani Rodarte M.D. DOSE LENGTH PRODUCT: 1292.30 mGy-cm. This examination, like all CT scans performed in the [facility], was performed utilizing techniques to minimize radiation dose exposure, including the use of iterative reconstruction and automated exposure control. NUMBER OF IMAGES: Multiple. COMPLICATIONS: None. MEDICATIONS: 1% lidocaine Moderate sedation was monitored by radiology nursing staff.  Monitoring of conscious sedation totaled 0 minutes. INDICATION: Rule out multiple myeloma PROCEDURE: Using intermittent CT guidance, the PadProof system was used to perform bone marrow aspirate via the left posterior superior iliac spine. Approximately 6 mL of marrow was  removed and sent to pathology. Next, bone marrow biopsy was performed. The biopsy specimen was placed in formalin and sent to pathology. The needle was then removed and hemostasis was achieved using manual compression.     Impression: Bone marrow aspiration and biopsy. Workstation performed: [id number]     XR chest portable ICU    Result Date: 6/11/2024  Narrative: XR CHEST PORTABLE ICU INDICATION: status post right IJ temporary hemodialysis catheter placement. COMPARISON: 6/8/2024 FINDINGS: Right internal jugular dialysis catheter projects over the SVC. Clear lungs. There is vascular congestion. No pneumothorax or pleural effusion. Enlarged cardiac silhouette, unchanged. Bones are unremarkable for age. Normal upper abdomen.     Impression: 1.  Right internal jugular dialysis catheter projects over the SVC. No pneumothorax. 2.  Cardiomegaly with vascular congestion. Workstation performed: [id number]     CT chest abdomen pelvis wo contrast    Result Date: 6/10/2024  Narrative: CT CHEST, ABDOMEN AND PELVIS WITHOUT IV CONTRAST INDICATION: hypotension, hypervolemic. COMPARISON: 6/3/2024 TECHNIQUE: CT examination of the chest, abdomen and pelvis was performed without intravenous contrast. Multiplanar 2D reformatted images were created from the source data. This examination, like all CT scans performed in the [facility], was performed utilizing techniques to minimize radiation dose exposure, including the use of iterative reconstruction and automated exposure control. Radiation dose length product (DLP) for this visit: 1673 mGy-cm Enteric Contrast: Not administered. FINDINGS: CHEST LUNGS: Lungs are clear. No tracheal or endobronchial lesion. PLEURA: Small left greater than right basilar effusions with compressive atelectasis HEART/GREAT VESSELS: Heart is unremarkable for patient's age. No thoracic aortic aneurysm. MEDIASTINUM AND SLAVA: Unremarkable. CHEST WALL AND LOWER NECK: Unremarkable. ABDOMEN  LIVER/BILIARY TREE: Enlarged fatty liver noted. GALLBLADDER: No calcified gallstones. No pericholecystic inflammatory change. SPLEEN: The spleen appears enlarged. PANCREAS: Unremarkable. ADRENAL GLANDS: Unremarkable. KIDNEYS/URETERS: Unremarkable. No hydronephrosis. STOMACH AND BOWEL: Moderate fecal stasis. APPENDIX: Normal. ABDOMINOPELVIC CAVITY: Mildly prominent left greater than right inguinal nodes. Left greater than right iliac chain adenopathy is redemonstrated and stable. Small periaortic and mesenteric root nodes are also identified. VESSELS: Unremarkable for patient's age. PELVIS REPRODUCTIVE ORGANS: Unremarkable for patient's age. URINARY BLADDER: Decompressed with a Robbins catheter in place ABDOMINAL WALL/INGUINAL REGIONS: Right central line tip terminating at the caval atrial junction. BONES: Age-appropriate degenerative change without acute osseous abnormality.     Impression: 1. Small left greater than right basilar effusions with associated compressive atelectasis. Lungs otherwise clear. 2. Periaortic retroperitoneal, left greater than right iliac chain and inguinal lymph nodes identified, stable and nonspecific. 3. No acute findings. Workstation performed: [id number]     XR chest portable    Result Date: 6/9/2024  Narrative: XR CHEST PORTABLE INDICATION: Fever and leukocytosis. COMPARISON: Chest CT 6/9/2024 and CXR 6/3/2024. FINDINGS: Right jugular catheter in right atrium. Mild pulmonary venous congestion. No pneumothorax or pleural effusion. Mild cardiomegaly. Bones are unremarkable for age. Normal upper abdomen.     Impression: Mild pulmonary venous congestion. Nothing to suggest pneumonia. Workstation performed: [id number]     CT lower extremity wo contrast left    Result Date: 6/9/2024  Narrative: CT left lower extremity without IV contrast INDICATION: severe pain. COMPARISON: None. TECHNIQUE: CT examination of the left lower extremity from the hip to the foot was performed. This examination,  like all CT scans performed in the [facility], was performed utilizing techniques to minimize radiation dose exposure, including the use of iterative reconstruction and automated exposure control software.  Multiplanar 2D reformatted images were created from the source data. Rad dose  1394.86 mGy-cm FINDINGS: OSSEOUS STRUCTURES:  No fracture, dislocation or destructive osseous lesion. No osseous erosion. There is mild to moderate left hip osteoarthrosis. There is moderate to severe tricompartmental osteoarthrosis of the knee. VISUALIZED MUSCULATURE:  Unremarkable. SOFT TISSUES: There is prominent subcutaneous edema and skin thickening in the thigh greater medially. There is severe circumferential subcutaneous edema and skin thickening throughout the lower leg extending into the dorsum of the foot. This may either reflect bland edema or extensive cellulitis. A well-defined rim-enhancing fluid collection not seen to suggest a discrete abscess. No soft tissue gas. OTHER PERTINENT FINDINGS:  None.     Impression: Extensive subcutaneous edema and skin thickening as described above. This may either reflect bland edema or extensive cellulitis. A well-defined rim-enhancing fluid collection not seen to suggest a discrete abscess. No soft tissue gas. Osteoarthrosis as above. No osseous erosion. Workstation performed: [id number]     IR temporary dialysis catheter placement    Result Date: 6/7/2024  Narrative: PROCEDURE: Temporary dialysis catheter placement Procedural Personnel Attending physician(s): Radames Liu MD Pre-procedure diagnosis: CLOVIS on CKD Post-procedure diagnosis: Same Clinical History: 44-year-old man with history of acute kidney injury on pre-existing chronic kidney disease, now with uremic symptoms and need to initiate hemodialysis. PROCEDURE SUMMARY: Ultrasound and fluoroscopy guided temporary dialysis catheter placement PROCEDURE DETAILS: Pre-procedure Consent: Informed consent  for the procedure including risks, benefits and alternatives was obtained and time-out was performed prior to the procedure. Preparation: The site was prepared and draped using maximal sterile barrier technique including cutaneous antisepsis. Anesthesia/sedation Level of anesthesia/sedation: No sedation Technique and Findings: Immediate preprocedure ultrasound demonstrated a large, dilated, and mildly difficult to compress right internal jugular vein, consistent with patient's fluid overload. Local anesthetic was administered. A dermatotomy was made. Access was obtained using a 21-gauge micropuncture needle under real-time ultrasound guidance, taking a course amenable for future conversion to tunneled dialysis catheter. A microwire was advanced to the right atrium and the needle exchanged for a micropuncture transitional dilator. The intravascular distance was measured with the microwire. A 24 cm temporary dialysis catheter was selected. An Amplatz wire was advanced to the inferior vena cava. The access tract was serially dilated and the temporary dialysis catheter was advanced over the wire into the right atrium. There was significant oozing/bleeding around the catheter, consistent with impaired hemostasis in the setting of uremia. This was refractory to application of pressure. Hemostasis was obtained with a 3-0 Vicryl pursestring suture around the catheter tract. The catheter was secured with nonabsorbable suture. Completion image demonstrated appropriate positioning of the newly placed catheter within the right atrium. The catheter was checked and both lumens provided brisk flow. Contrast Contrast dose: None Radiation Dose Fluoroscopy time: 1.5 MIN Reference air kerma: 86 mGy Additional Details Specimens removed: None Estimated blood loss (mL): 200 Complications: No immediate complications.     Impression: 1. Uncomplicated nontunneled/temporary dialysis catheter placement (14 Azerbaijani by 24 cm). Catheter tip  terminates in the right atrium. The catheter is ready for immediate use. 2. Impaired hemostasis was discussed with Dr. Timmons of Nephrology. DDAVP will be administered to help mitigate rebleeding risk. Workstation performed: [id number]     Echo complete w/ contrast if indicated    Result Date: 6/4/2024  Narrative:   Left Ventricle: Left ventricular cavity size is normal. Wall thickness is moderately increased. There is moderate concentric hypertrophy. The left ventricular ejection fraction is 58% by visual estimation. Systolic function is normal. Wall motion is normal. Diastolic function is mildly abnormal, consistent with grade I (abnormal) relaxation.  Left atrial filling pressure is normal.   Left Atrium: The atrium is mildly dilated.   Right Atrium: The atrium is mildly dilated.   Mitral Valve: There is mild regurgitation.   Tricuspid Valve: There is mild regurgitation. The right ventricular systolic pressure is moderately elevated. The estimated right ventricular systolic pressure is 58.00 mmHg.   Pulmonic Valve: There is mild regurgitation.   Compared to previous exam, there is no significant change.     CT chest abdomen pelvis wo contrast    Result Date: 6/3/2024  Narrative: CT CHEST, ABDOMEN AND PELVIS WITHOUT IV CONTRAST INDICATION: Dyspnea and renal failure. COMPARISON: CT of the chest from 2/17/2024, CT of the abdomen and pelvis from 5/4/2022, and CT of the chest, abdomen and pelvis from 12/1/2022. TECHNIQUE: CT examination of the chest, abdomen and pelvis was performed without intravenous contrast due to elevated creatinine. Multiplanar 2D reformatted images were created from the source data. This examination, like all CT scans performed in the [facility], was performed utilizing techniques to minimize radiation dose exposure, including the use of iterative reconstruction and automated exposure control. Radiation dose length product (DLP) for this visit:  2830 mGy-cm Enteric Contrast:  Enteric contrast was not administered. FINDINGS: Evaluation of visceral structures and vasculature is limited by lack of intravenous contrast. CHEST BRONCHOPULMONARY:  Clear central airways. There is slight mosaic attenuation. There are areas of atelectasis/or scarring in the lower lobes, similar to February 2024. PLEURA: Small left pleural effusion. No pneumothorax. HEART/GREAT VESSELS: The heart is normal in size. No pericardial effusion. Normal caliber thoracic aorta. MEDIASTINUM/LYMPH NODES:  No axillary or mediastinal lymphadenopathy by size criteria. Nonspecific subcentimeter mediastinal lymph nodes are seen. Evaluation for hilar lymphadenopathy is limited without IV contrast. CHEST WALL AND LOWER NECK:  Unremarkable. ABDOMEN LIVER/BILIARY TREE:  Unremarkable unenhanced appearance of the liver. No biliary ductal dilation. GALLBLADDER:  No calcified gallstones. No pericholecystic inflammatory change. SPLEEN: Unremarkable unenhanced appearance. PANCREAS:  Unremarkable unenhanced appearance. No dilation of the main pancreatic duct. ADRENAL GLANDS:  Unremarkable unenhanced appearance. KIDNEYS/URETERS:  No intrarenal or ureteral calculi. No hydronephrosis. No definite focal renal lesions. STOMACH AND BOWEL:  Evaluation of the gastrointestinal tract is somewhat limited by underdistention and lack of oral contrast. No bowel obstruction or convincing inflammation. APPENDIX: Normal appendix. ABDOMINOPELVIC CAVITY:  No ascites or pneumoperitoneum. LYMPH NODES: There are several enlarged left external iliac lymph nodes measuring up to 2.1 cm in short axis. There is also an enlarged left inguinal lymph node measuring 1.5 cm in short axis and several additional smaller left inguinal lymph nodes. These are increased compared to the May 2022 CT. No other abdominal or pelvic lymphadenopathy by size criteria. Bilateral right external iliac lymph nodes are prominent, however below size threshold for lymphadenopathy. VESSELS:  Normal caliber aorta. PELVIS REPRODUCTIVE ORGANS:  The prostate gland is not enlarged. URINARY BLADDER:  Unremarkable. ABDOMINAL WALL/INGUINAL REGIONS: No ventral abdominal wall hernia. In large portion of the subcutaneous tissues of the anterior abdominal wall are outside of the field-of-view. There is subcutaneous tissue stranding and skin thickening of the pannus as on the prior studies. There is a partially imaged tubular mildly hyperdense lesion along the left side of the undersurface of the pannus in the skin or immediately deep to the skin, which has been present dating back to at least 2021. MUSCULOSKELETAL:  No focal aggressive osseous lesions. Degenerative changes of the spine. There is a healing, subacute fracture of the right 7th rib anteriorly.     Impression: No acute pulmonary pathology. No acute abdominal or pelvic pathology within limitation of a noncontrast study. In particular, no hydronephrosis. Left inguinal and left external iliac lymphadenopathy, increased from May 2022. This may be reactive to a process in the left lower extremity or in the partially imaged pannus. Additional findings as above. Workstation performed: [id number]     NM lung ventilation / perfusion    Result Date: 6/3/2024  Narrative: VENTILATION AND PERFUSION SCAN INDICATION: Shortness of breath, hypoxia, increased D-dimer, left lower extremity swelling COMPARISON:  Chest radiograph 6/3/2024 TECHNIQUE:  Posterior ventilation imaging was performed after the inhalation of 32 mCi nebulized Tc-99m DTPA with deposition of less than 1 mCi of activity within the lungs.  Multiplanar perfusion imaging was next performed following the intravenous administration of 4.2 mCi Tc-99m labeled MAA. FINDINGS: Ventilation imaging demonstrates a minimally heterogeneous distribution of radiopharmaceutical throughout both lungs. There is central deposition of the radiotracer. Perfusion imaging demonstrates a mildly heterogeneous distribution of the  "radiopharmaceutical throughout both lungs. There is mildly decreased perfusion activity in the left lung as compared to the right, but matching the ventilation pattern. There is no  significant  segmental or subsegmental defect.     Impression: The probability for pulmonary embolus is low. Workstation performed: [id number]     XR chest 1 view portable    Result Date: 6/3/2024  Narrative: XR CHEST PORTABLE INDICATION: chest pain, shortness of breath. COMPARISON: Chest radiograph February 15, 2024 FINDINGS: Clear lungs. No pneumothorax or pleural effusion. Normal cardiomediastinal silhouette. Bones are unremarkable for age. Normal upper abdomen.     Impression: No acute cardiopulmonary disease. Workstation performed: [id number]          Nathalie LAMBERT  Cardiology      \"This note was completed in part utilizing [employer] direct voice recognition software.   Grammatical errors, random word insertion, spelling mistakes, and incomplete sentences may be an occasional consequence of the system secondary to software limitations, ambient noise and hardware issues.    Please read the chart carefully and recognize, using context, where substitutions have occurred.  If you have any questions or concerns about the context, text or information contained within the body of this dictation, please contact myself, the provider, for further clarification.\"  "

## 2024-06-24 NOTE — UTILIZATION REVIEW
Continued Stay Review    Date: 6/24/24                          Current Patient Class: inpatient  Current Level of Care: med surg  HPI:44 y.o. male initially admitted on 6/3/24     Assessment/Plan:   ARF, renal following. Initially started on intermittent dialysis on June 7. Subsequently on June 9 the patient was transition to CRRT temporarily due to hypotension. And then transition back to intermittent hemodialysis on Tori 10 due to hemodynamic stability. Plan: Dialysis TTS schedule with dialysis tomorrow and then will plan for dialysis Wednesday. Monitor for renal recovery. Monitor labs/lytes.  Imdur added per cardio, scheduled for Lexiscan stress test when medically appropriate to evaluate for ischemia. Hgb 7.1 s/p transfusion yesterday. Continue IV Venofer infusions M-W-F.       Vital Signs (last 3 days)       Date/Time Temp Pulse Resp BP MAP (mmHg) SpO2 O2 Device Patient Position - Orthostatic VS Oksana Coma Scale Score Pain    06/24/24 1031 -- -- -- -- -- -- -- -- -- 8 06/24/24 0804 -- -- -- -- -- -- -- -- -- 8 06/24/24 07:34:20 98 °F (36.7 °C) 58 19 164/81 109 96 % -- -- -- --    06/24/24 0533 -- -- -- -- -- -- -- -- -- 8 06/24/24 0027 -- -- -- -- -- -- -- -- -- 7 06/23/24 23:02:10 98.2 °F (36.8 °C) 63 -- 145/68 94 96 % -- -- -- --    06/23/24 2300 98.2 °F (36.8 °C) 60 16 145/68 -- 95 % -- -- -- --    06/23/24 2252 -- -- -- -- -- -- -- -- -- 8 06/23/24 2246 98 °F (36.7 °C) -- -- -- -- -- -- -- -- --    06/23/24 22:42:26 97.7 °F (36.5 °C) 58 16 159/79 106 96 % -- -- -- --    06/23/24 21:50:11 98.3 °F (36.8 °C) 60 16 139/74 96 96 % -- -- -- --    06/23/24 2043 98.3 °F (36.8 °C) 58 16 145/77 -- 93 % -- -- -- --    06/23/24 20:15:37 98.3 °F (36.8 °C) 63 -- 136/67 90 92 % -- -- -- --    06/23/24 2013 98 °F (36.7 °C) 57 16 136/67 -- 93 % -- -- -- --    06/23/24 1958 98.7 °F (37.1 °C) 64 16 138/68 -- 90 % -- -- -- --    06/23/24 19:57:48 98.7 °F (37.1 °C) 60 -- 138/68 91 89 % -- -- -- --    06/23/24  1953 98 °F (36.7 °C) 60 16 138/68 -- 86 % -- -- -- --    06/23/24 1947 -- 60 16 138/68 -- 90 % -- -- -- --    06/23/24 19:38:30 98 °F (36.7 °C) 62 -- 138/68 91 89 % -- -- -- --    06/23/24 1930 -- -- -- -- -- -- -- -- -- 4 06/23/24 19:06:28 98 °F (36.7 °C) 57 14 146/74 98 89 % -- -- -- --    06/23/24 1833 -- -- -- -- -- -- -- -- -- 7 06/23/24 1718 -- -- -- -- -- -- -- -- -- 7 06/23/24 15:42:36 97.7 °F (36.5 °C) -- -- 130/68 89 -- -- -- -- --    06/23/24 1313 -- -- -- -- -- -- -- -- -- 8 06/23/24 11:50:14 97.9 °F (36.6 °C) 60 16 126/50 75 93 % -- -- -- --    06/23/24 11:45:47 -- 57 -- 126/50 75 93 % -- -- -- --    06/23/24 0952 -- -- -- -- -- -- -- -- -- 7 06/23/24 0814 -- -- -- -- -- -- -- -- -- 7 06/23/24 07:39:18 97.7 °F (36.5 °C) 56 16 131/50 77 93 % None (Room air) -- -- 7 06/23/24 0610 -- -- -- -- -- -- -- -- -- 8 06/23/24 0244 -- -- -- -- -- -- -- -- -- 8 06/23/24 0033 -- -- -- -- -- -- -- -- -- 8 06/22/24 22:15:12 98.1 °F (36.7 °C) 62 18 133/52 79 94 % -- -- -- --    06/22/24 2204 -- -- -- -- -- -- -- -- -- 8 06/22/24 2100 -- -- -- -- -- 89 % -- -- -- --    06/22/24 19:46:58 97.6 °F (36.4 °C) 55 18 125/67 86 95 % -- -- -- --    06/22/24 1844 -- -- -- -- -- -- -- -- -- 8 06/22/24 1631 98.2 °F (36.8 °C) 62 18 121/58 79 -- -- Lying -- --    06/22/24 1629 -- -- -- -- -- -- -- -- -- 8 06/22/24 1600 -- 60 18 112/59 77 95 % -- -- -- --    06/22/24 1530 -- 57 18 111/60 77 96 % -- -- -- --    06/22/24 1500 -- 53 18 114/52 73 -- -- -- -- --    06/22/24 1445 -- 55 -- 114/54 74 94 % -- -- -- --    06/22/24 1430 -- 55 18 102/50 67 95 % -- -- -- --    06/22/24 1400 -- 57 18 -- -- 96 % -- -- -- --    06/22/24 1345 -- 70 -- 118/56 77 93 % -- -- -- --    06/22/24 1330 -- 56 18 118/57 77 90 % -- -- -- --    06/22/24 1300 -- 60 20 127/57 80 95 % -- -- -- --    06/22/24 1258 -- 61 18 130/60 83 97 % -- -- -- --    06/22/24 1249 -- -- -- -- -- -- -- -- -- 8 06/22/24 1242 98.2 °F (36.8 °C)  63 20 128/58 81 89 % -- Lying -- --    06/22/24 0953 -- -- -- -- -- -- -- -- -- 8 06/22/24 0810 -- -- -- -- -- -- -- -- -- 8 06/22/24 08:03:49 -- 68 -- 130/64 86 93 % None (Room air) -- -- --    06/22/24 0800 -- -- -- -- -- -- -- -- 15 --    06/22/24 0235 -- -- -- -- -- -- -- -- -- 8 06/22/24 0053 -- -- -- -- -- -- -- -- -- 8 06/21/24 22:20:51 98.3 °F (36.8 °C) 60 18 133/63 86 91 % -- -- -- --    06/21/24 2203 -- -- -- -- -- -- -- -- -- 8 06/21/24 2100 -- -- -- -- -- -- -- -- 15 --    06/21/24 18:59:34 98.2 °F (36.8 °C) 55 16 121/66 84 100 % -- -- -- --    06/21/24 18:31:17 98.4 °F (36.9 °C) 66 18 119/46 70 93 % -- -- -- --    06/21/24 1801 -- -- -- -- -- -- -- -- -- 8 06/21/24 1629 -- -- -- -- -- -- -- -- -- 7 06/21/24 15:42:50 98.4 °F (36.9 °C) 56 20 137/56 83 93 % -- -- -- --    06/21/24 1405 -- -- -- -- -- -- -- -- -- 9    06/21/24 14:04:10 -- 64 -- 151/72 98 95 % -- -- -- --    06/21/24 14:03:29 -- 63 -- 151/72 98 96 % -- -- -- --    06/21/24 1303 -- -- -- -- -- -- -- -- -- 2    06/21/24 0844 -- -- -- -- -- -- -- -- -- 7    06/21/24 08:13:40 97.6 °F (36.4 °C) 63 -- 139/67 91 98 % -- -- -- --    06/21/24 0352 -- -- -- -- -- -- -- -- -- 7          Weight (last 2 days)       Date/Time Weight    06/24/24 0600 182 (401.8)    06/23/24 0600 180 (397)    06/22/24 0610 184 (405)              Pertinent Labs/Diagnostic Results:   Radiology:   VAS VENOUS DUPLEX - LOWER LIMB BILATERAL   Final Interpretation by Raúl Ortega MD (06/17 2012)      IR tunneled central line placement   Final Interpretation by Radames Liu MD (06/14 7126)   Successful placement of 15.5 [place] by 32 cm right chest/right internal jugular vein tunneled hemodialysis catheter. The tip terminates in the right atrium. The catheter is ready for immediate use.      Workstation performed: [id number]         IR biopsy bone marrow   Final Interpretation by Geovani Rodarte MD (06/13 5864)   Bone marrow  aspiration and biopsy.      Workstation performed: [id number]         XR chest portable ICU   Final Interpretation by Daryl Mccarthy MD (06/11 0811)      1.  Right internal jugular dialysis catheter projects over the SVC. No pneumothorax.      2.  Cardiomegaly with vascular congestion.            Workstation performed: [id number]         CT lower extremity wo contrast left   Final Interpretation by Jimbo Camacho MD (06/09 2045)      Extensive subcutaneous edema and skin thickening as described above. This may either reflect bland edema or extensive cellulitis. A well-defined rim-enhancing fluid collection not seen to suggest a discrete abscess. No soft tissue gas.      Osteoarthrosis as above. No osseous erosion.         Workstation performed: [id number]         CT chest abdomen pelvis wo contrast   Final Interpretation by Allen Mcgowan MD (06/10 0804)   1. Small left greater than right basilar effusions with associated compressive atelectasis. Lungs otherwise clear.   2. Periaortic retroperitoneal, left greater than right iliac chain and inguinal lymph nodes identified, stable and nonspecific.   3. No acute findings.                     Workstation performed: [id number]         XR chest portable   Final Interpretation by Danyell Ford MD (06/09 2240)      Mild pulmonary venous congestion.      Nothing to suggest pneumonia.            Workstation performed: [id number]         IR temporary dialysis catheter placement   Final Interpretation by Radames Liu MD (06/07 1627)   1. Uncomplicated nontunneled/temporary dialysis catheter placement (14 Citizen of the [place] by 24 cm). Catheter tip terminates in the right atrium. The catheter is ready for immediate use.      2. Impaired hemostasis was discussed with Dr. Timmons of Nephrology. DDAVP will be administered to help mitigate rebleeding risk.      Workstation performed: [id number]         CT chest abdomen pelvis wo contrast   Final Interpretation by  Margoth Arango MD (06/03 1558)      No acute pulmonary pathology.      No acute abdominal or pelvic pathology within limitation of a noncontrast study. In particular, no hydronephrosis.      Left inguinal and left external iliac lymphadenopathy, increased from May 2022. This may be reactive to a process in the left lower extremity or in the partially imaged pannus.      Additional findings as above.                  Workstation performed: [id number]         NM lung ventilation / perfusion   Final Interpretation by Daniel Ackerman MD (06/03 1412)      The probability for pulmonary embolus is low.      Workstation performed: [id number]         XR chest 1 view portable   ED Interpretation by Kit Antoine MD (06/03 0753)   Mild pulmonary edema right worse than left, no additional acute cardiopulmonary abnormality      Final Interpretation by Miah Toledo MD (06/03 0890)      No acute cardiopulmonary disease.            Workstation performed: [id number]           Cardiology:  ECG 12 lead   Final Result by Gino Fulton MD (06/24 2220)   Sinus bradycardia   Otherwise normal ECG   Confirmed by Gino Fulton ([id number]) on 6/24/2024 7:55:49 AM      Echo complete w/ contrast if indicated   Final Result by Niall Murcia DO (06/04 113)        Left Ventricle: Left ventricular cavity size is normal. Wall thickness    is moderately increased. There is moderate concentric hypertrophy. The    left ventricular ejection fraction is 58% by visual estimation. Systolic    function is normal. Wall motion is normal. Diastolic function is mildly    abnormal, consistent with grade I (abnormal) relaxation.  Left atrial    filling pressure is normal.     Left Atrium: The atrium is mildly dilated.     Right Atrium: The atrium is mildly dilated.     Mitral Valve: There is mild regurgitation.     Tricuspid Valve: There is mild regurgitation. The right ventricular    systolic pressure is moderately elevated. The estimated right ventricular     systolic pressure is 58.00 mmHg.     Pulmonic Valve: There is mild regurgitation.     Compared to previous exam, there is no significant change.         ECG 12 lead   Final Result by Anamika Cardozo MD (06/04 0903)   Sinus rhythm with occasional Premature ventricular complexes   Cannot rule out Anterior infarct , age undetermined   Abnormal ECG   When compared with ECG of 15-FEB-2024 16:34,   Premature ventricular complexes are now Present   Confirmed by Anamika Cardozo ([id number]) on 6/4/2024 9:03:29 AM        Results from last 7 days   Lab Units 06/24/24  0527 06/23/24  0619 06/22/24  0505 06/21/24  0531 06/20/24  0538 06/19/24  0301 06/19/24  0301   WBC Thousand/uL 6.68 6.79 7.82 8.30 9.65  --  12.82*   HEMOGLOBIN g/dL 7.1* 7.0* 6.8* 6.6* 7.1*  --  8.0*   HEMATOCRIT % 21.9* 22.0* 21.8* 21.0* 22.7*  --  25.2*   PLATELETS Thousands/uL 301 344 310 340 377  --  405*   TOTAL NEUT ABS Thousands/µL 4.63 4.52 5.65 5.94 7.09   < >  --    BANDS PCT %  --   --   --   --   --   --  1    < > = values in this interval not displayed.     Results from last 7 days   Lab Units 06/24/24  0527 06/23/24 0619 06/22/24  0505 06/21/24  0531 06/20/24  0538   SODIUM mmol/L 131* 128* 127* 128* 128*   POTASSIUM mmol/L 5.2 4.6 5.0 4.8 5.0   CHLORIDE mmol/L 94* 92* 91* 92* 91*   CO2 mmol/L 25 26 26 26 24   ANION GAP mmol/L 12 10 10 10 13   BUN mg/dL 47* 34* 54* 44* 57*   CREATININE mg/dL 10.79* 8.97* 11.31* 9.46* 11.36*   EGFR ml/min/1.73sq m 5 6 4 6 4   CALCIUM mg/dL 8.4 8.4 8.7 8.2* 8.5     Results from last 7 days   Lab Units 06/24/24  1141 06/24/24  0738 06/23/24  1938 06/23/24  1607 06/23/24  1121 06/23/24  0730 06/22/24  2047 06/22/24  1628 06/22/24  1131 06/22/24  0704 06/21/24  2025 06/21/24  1601   POC GLUCOSE mg/dl 103 87 128 104 105 87 98 112 90 78 96 105     Results from last 7 days   Lab Units 06/24/24  0527 06/23/24  0619 06/22/24  0505 06/21/24  0531 06/20/24  0538 06/19/24  0307 06/18/24  0603   GLUCOSE RANDOM mg/dL 84 94 86  86 88 103 94     Results from last 7 days   Lab Units 06/21/24 2037 06/21/24  1839 06/21/24  1601   HS TNI 0HR ng/L  --   --  12   HS TNI 2HR ng/L  --  14  --    HSTNI D2 ng/L  --  2  --    HS TNI 4HR ng/L 15  --   --    HSTNI D4 ng/L 3  --   --      Results from last 7 days   Lab Units 06/24/24  0615   UNIT PRODUCT CODE  [id number]   UNIT NUMBER  [id number]   UNITABO  A   UNITRH  POS   CROSSMATCH  Compatible   UNIT DISPENSE STATUS  Presumed Trans   UNIT PRODUCT VOL mL 350     Medications:   Scheduled Medications:  atorvastatin, 80 mg, Oral, Daily  Cholecalciferol, 5,000 Units, Oral, Daily  cyanocobalamin, 1,000 mcg, Oral, Daily  docusate sodium, 100 mg, Oral, BID  heparin (porcine), 5,000 Units, Subcutaneous, Q8H SHAZIA  insulin lispro, 1-6 Units, Subcutaneous, HS  insulin lispro, 2-12 Units, Subcutaneous, TID AC  iron sucrose, 200 mg, Intravenous, Once per day on Monday Wednesday Friday  isosorbide mononitrate, 30 mg, Oral, Daily  lidocaine, 1 patch, Topical, Daily  metoprolol succinate, 25 mg, Oral, Daily  nystatin, 1 Application, Topical, BID  senna, 1 tablet, Oral, HS  torsemide, 40 mg, Oral, Daily    PRN Meds:  HYDROmorphone, 0.5 mg, Intravenous, Q3H PRN  midodrine, 5 mg, Oral, Before Dialysis  nitroglycerin, 0.4 mg, Sublingual, Q5 Min PRN  ondansetron, 4 mg, Intravenous, Q6H PRN  oxyCODONE, 5 mg, Oral, Q6H PRN  polyethylene glycol, 17 g, Oral, Daily PRN    Discharge Plan: d    Network Utilization Review Department  ATTENTION: Please call with any questions or concerns to [phone] and carefully listen to the prompts so that you are directed to the right person. All voicemails are confidential.   For Discharge needs, contact Care Management DC Support Team at [phone] opt. 2  Send all requests for admission clinical reviews, approved or denied determinations and any other requests to dedicated fax number below belonging to the campus where the patient is receiving treatment. List of dedicated fax  numbers for the Facilities:  FACILITY NAME UR FAX NUMBER   ADMISSION DENIALS (Administrative/Medical Necessity) [phone]   DISCHARGE SUPPORT TEAM (NETWORK) [phone]   PARENT CHILD HEALTH (Maternity/NICU/Pediatrics) [phone]   [facility] [phone]   [facility] [phone]   [facility] [phone]   [facility] [phone]   [facility] [phone]   [facility] [phone]   [facility] [phone]   [facility] [phone]   [facility] [phone]   [facility] [phone]   [facility] [phone]   [facility] [phone]

## 2024-06-24 NOTE — PROGRESS NOTES
[facility]  Progress Note  Name: Joaquin Frankel I  MRN: [id number]  Unit/Bed#: [address] [address] I Date of Admission: 6/3/2024   Date of Service: 6/24/2024 I Hospital Day: 21    Assessment & Plan   * Acute renal failure (ARF) (HCC)  Assessment & Plan  POA Cr 6.40 >11.36 > 9.46> 11.31 >8.97 >10.79    BL CR 1.3-1.4  Initiated on HD on 6/7  UA: Hematuria, proteinuria.  UA CR - 5280  Serologies: ANCA titer was negative, anti-GBM was negative.  JOSE ROBERTO/anti-double-stranded DNA was negative.  Kappa/lambda ratio was within normal limits.  Serum and urine immunofixation showed IgG kappa monoclonal bands.  Complement C3 and C4 were normal.  Beta-2 microglobulin was elevated.  CT: No evidence of hydronephrosis  D/W recs nephrology:  Suspect MGRS  Inpatient HD TTS schedule.    Plan for next dialysis tomorrow> OP HD MWF  At this time no evidence of renal recovery.  Continue dialysis schedule.  Continue Demadex  F/U bone marrow biopsies/renal biopsy      Coronary artery disease with angina pectoris (HCC)  Assessment & Plan  Patient with a history of CAD with multivessel disease s/p stents.    PCI to the left circumflex in 2010, NSTEMI in November 2020 and PCI the RCA 12/1/2020   Held aspirin initially for biopsy, subsequently resumed.    Continue on Toprol-XL  Continue statin  D/W cardiology recs:  continue Lipitor 80 mg once a day  continue Toprol-XL 25 mg daily  add Imdur 30 mg daily  schedule patient for Lexiscan nuclear stress test today    Hyponatremia  Assessment & Plan  Na-131  2/2 Vollume overload  Continue on HD  D/W nephrology recs:  Per nephro overload nonconcerning  Fluid restrict  HD    Anemia of chronic disease  Assessment & Plan  Patient has anemia of chronic disease likely secondary to underlying nephropathy,?  Myeloma.      He did have some episodes of hematuria.  No further episodes. No further overt bleeding    HG down trended to 6.7 g/dL, s/p 2 PRBC on 6/11, 6/23  Hg 7.1  B12 310,  continue supplementation  Iron panel shows iron deficiency.    S/P IV venofer 200mg EOD  transfuse for hemoglobin less than 7.0 or drastic drop in hemoglobin  Optimize anemia before cardiac workup    SIRS (systemic inflammatory response syndrome) (HCC)-resolved as of 6/21/2024  Assessment & Plan  CT left lower extremity-extensive subcutaneous edema and skin thickening  CT CAP-without any acute abnormality  BCx negative, MRSA surveillance negative  Noted to left lower extremity cellulitis superimposed on lymphedema, now improved  Source likely recurrent draining abscess under the pannus on 6/13.    Cx: Of beta-hemolytic Streptococcus group C  S/P I&D of the pannus by surgery on 6/13.    Continue IV cefazolin post HD, > 6/18  Negative venous Doppler    Wound of abdomen  Assessment & Plan  Seen by wound care, recommended general surgery consultation  Appreciate surgery input  Continue local wound care per wound care nurse  Follow-up in the outpatient wound center    Acute respiratory failure with hypoxia (HCC)  Assessment & Plan  Noted to have worsening hypoxia during hospitalization requiring MFNC  Likely multifactorial including volume overload, suspected underlying ANGY via OHS.  CT chest with evidence of bibasilar effusion with compressive atelectasis  Continue ultrafiltration as tolerated  Continue supplemental oxygen as needed  Incentive spirometry  Patient states he has an upcoming appointment with a sleep specialist and ultimately will undergo a sleep study as an outpatient.  He has been told he has overnight hypoxia.  Overnight pulse ox      Chronic acquired lymphedema  Assessment & Plan  Patient developed LLE lymphedema after left lower extremity surgical procedure several years ago.  Patient complaining of increasing pain in the left lower extremity likely secondary to volume overload.  Left lower extremity venous Doppler: Negative  Prior referrals ambulatory referral to lymphedema clinic.    Morbid obesity  ([facility])  Assessment & Plan  With BMI of 64.42  Encourage lifestyle modification on discharge    Diabetes ([facility])  Assessment & Plan  Lab Results   Component Value Date    HGBA1C 5.7 (H) 06/09/2024     Recent Labs     06/23/24  1938 06/24/24  0738 06/24/24  1141 06/24/24  1615   POCGLU 128 87 103 92     Blood Sugar Average: Last 72 hrs:  (P) [id number]    Amaryl held during his hospitalization.   Acceptable, continue to monitor on current regimen  Continue diabetic diet.      Hypertension  Assessment & Plan  Held amlodipine and carvedilol for low blood pressure initially  Requiring midodrine predialysis as needed  Blood pressure stable/improving at present  Nephro recs following:    Started Toprol-XL 25 mg daily  Started on torsemide 40 mg daily per nephrology  Monitor intake output, daily weight  Monitor blood pressure    Chest pain-resolved as of 6/15/2024  Assessment & Plan  POA.  Denies chest discomfort at present  VQ scan low suspicion for PE  Echo with normal EF and grade 1 diastolic dysfunction  Cardiology input appreciated           VTE Pharmacologic Prophylaxis: VTE Score: 7 Moderate Risk (Score 3-4) - Pharmacological DVT Prophylaxis Ordered: heparin.    Mobility:   Basic Mobility Inpatient Raw Score: 20  JH-HLM Goal: 6: Walk 10 steps or more  JH-HLM Achieved: 7: Walk 25 feet or more  JH-HLM Goal achieved. Continue to encourage appropriate mobility.    Patient Centered Rounds: I evaluated the patient without nursing staff present due to alternate patient care responsibilities    Discussions with Specialists or Other Care Team Provider: Nephrology, cardiology,     Education and Discussions with Family / Patient:  Spoke with patient.     Total Time Spent on Date of Encounter in care of patient: 45 mins. This time was spent on one or more of the following: performing physical exam; counseling and coordination of care; obtaining or reviewing history; documenting in the medical record; reviewing/ordering  tests, medications or procedures; communicating with other healthcare professionals and discussing with patient's family/caregivers.    Current Length of Stay: 21 day(s)  Current Patient Status: Inpatient   Certification Statement: The patient will continue to require additional inpatient hospital stay due to clinical course and specialist recs  Discharge Plan: Anticipate discharge in 24-48 hrs to discharge location to be determined pending rehab evaluations.    Code Status: Level 1 - Full Code    Subjective:   Patient seen and examined at bedside while seated upright, reported no chest pain, reported recurrent nausea and vomiting in the mornings, reported did not ask for nausea medication; vomitus is bilious and no food, reported last meal at dinner between 5 to 6 PM.  Patient reported multiple episodes while inpatient.  Reported last bowel movement yesterday a.m. that was nonbloody.  Patient reported just going down coming back from stress test and did not really have pain.    Objective:     Vitals:   Temp (24hrs), Av.2 °F (36.8 °C), Min:97.7 °F (36.5 °C), Max:98.7 °F (37.1 °C)    Temp:  [97.7 °F (36.5 °C)-98.7 °F (37.1 °C)] 98.5 °F (36.9 °C)  HR:  [57-64] 59  Resp:  [14-19] 18  BP: (136-166)/(67-82) 164/82  SpO2:  [86 %-96 %] 94 %  Body mass index is 62.93 kg/m².     Input and Output Summary (last 24 hours):     Intake/Output Summary (Last 24 hours) at 2024 1713  Last data filed at 2024 1230  Gross per 24 hour   Intake 741.67 ml   Output --   Net 741.67 ml       Physical Exam  Vitals and nursing note reviewed.   Constitutional:       General: He is not in acute distress.     Appearance: He is well-developed. He is obese.   HENT:      Head: Normocephalic and atraumatic.   Eyes:      Conjunctiva/sclera: Conjunctivae normal.   Cardiovascular:      Rate and Rhythm: Normal rate and regular rhythm.      Heart sounds: No murmur heard.  Pulmonary:      Effort: Pulmonary effort is normal. No respiratory  distress.      Breath sounds: Normal breath sounds. No wheezing or rhonchi.   Abdominal:      Palpations: Abdomen is soft.      Tenderness: There is no abdominal tenderness. There is no guarding or rebound.   Musculoskeletal:         General: No swelling.      Cervical back: Neck supple.      Right lower leg: No edema.      Left lower leg: Edema (4) present.   Skin:     General: Skin is warm and dry.      Capillary Refill: Capillary refill takes less than 2 seconds.   Neurological:      Mental Status: He is alert and oriented to person, place, and time.   Psychiatric:         Mood and Affect: Mood normal.         Behavior: Behavior normal.          Additional Data:     Labs:  Results from last 7 days   Lab Units 06/24/24  0527 06/20/24  0538 06/19/24  0301   WBC Thousand/uL 6.68   < > 12.82*   HEMOGLOBIN g/dL 7.1*   < > 8.0*   HEMATOCRIT % 21.9*   < > 25.2*   PLATELETS Thousands/uL 301   < > 405*   BANDS PCT %  --   --  1   SEGS PCT % 70   < >  --    LYMPHO PCT % 14   < > 14   MONO PCT % 10   < > 3*   EOS PCT % 5   < > 3    < > = values in this interval not displayed.     Results from last 7 days   Lab Units 06/24/24  0527   SODIUM mmol/L 131*   POTASSIUM mmol/L 5.2   CHLORIDE mmol/L 94*   CO2 mmol/L 25   BUN mg/dL 47*   CREATININE mg/dL 10.79*   ANION GAP mmol/L 12   CALCIUM mg/dL 8.4   GLUCOSE RANDOM mg/dL 84           Results from last 7 days   Lab Units 06/24/24  1615 06/24/24  1141 06/24/24  0738 06/23/24  1938 06/23/24  1607 06/23/24  1121 06/23/24  0730 06/22/24  2047 06/22/24  1628 06/22/24  1131 06/22/24  0704 06/21/24 2025   POC GLUCOSE mg/dl 92 103 87 128 104 105 87 98 112 90 78 96               Lines/Drains:  Invasive Devices       Peripheral Intravenous Line  Duration             Peripheral IV 06/23/24 Distal;Right;Upper;Ventral (anterior) Arm <1 day              Hemodialysis Catheter  Duration             HD Permanent Double Catheter 10 days                      Imaging: Reviewed radiology reports  from this admission including: Venous duplex    Recent Cultures (last 7 days):           Last 24 Hours Medication List:   Current Facility-Administered Medications   Medication Dose Route Frequency Provider Last Rate    atorvastatin  80 mg Oral Daily [place] PETRA Shell      Cholecalciferol  5,000 Units Oral Daily [facility] Bear, PETRA      cyanocobalamin  1,000 mcg Oral Daily [facility] Bear, PETRA      docusate sodium  100 mg Oral BID [facility] PETRA Sifuentes      heparin (porcine)  5,000 Units Subcutaneous Q8H [facility] PETRA Sifuentes      HYDROmorphone  0.5 mg Intravenous Q3H PRN Alyssa Levy MD      insulin lispro  1-6 Units Subcutaneous HS [facility] PETRA Sifuentes      insulin lispro  2-12 Units Subcutaneous TID AC [facility] PETRA Sifuentes      iron sucrose  200 mg Intravenous Once per day on Monday Wednesday Friday Alyssa Levy  mg (06/23/24 1323)    isosorbide mononitrate  30 mg Oral Daily PETRA Orlando      lidocaine  1 patch Topical Daily [facility] PETRA Sifuentes      metoprolol succinate  25 mg Oral Daily Joes De Jesus Cardozo PA-C      midodrine  5 mg Oral Before Dialysis [facility] PETRA Sifuentes      nitroglycerin  0.4 mg Sublingual Q5 Min PRN Alyssa Levy MD      nystatin  1 Application Topical BID [facility] PETRA Sifuentes      ondansetron  4 mg Intravenous Q6H PRN [place] PETRA Shell      [START ON 6/25/2024] ondansetron  4 mg Intravenous Daily Alyssa Levy MD      oxyCODONE  5 mg Oral Q6H PRN Dennis Waterman MD      polyethylene glycol  17 g Oral Daily PRN [facility] PETRA Sifuentes      senna  1 tablet Oral HS [facility] PETRA Sifuentes      torsemide  40 mg Oral Daily Kylie Kendall MD          Today, Patient Was Seen By: Alyssa Levy MD    **Please Note: This note may have been constructed using a voice recognition system.**

## 2024-06-25 ENCOUNTER — APPOINTMENT (INPATIENT)
Dept: DIALYSIS | Facility: HOSPITAL | Age: 44
DRG: 673 | End: 2024-06-25
Attending: INTERNAL MEDICINE
Payer: COMMERCIAL

## 2024-06-25 LAB
ANION GAP SERPL CALCULATED.3IONS-SCNC: 11 MMOL/L (ref 4–13)
BUN SERPL-MCNC: 57 MG/DL (ref 5–25)
CALCIUM SERPL-MCNC: 8.9 MG/DL (ref 8.4–10.2)
CHLORIDE SERPL-SCNC: 92 MMOL/L (ref 96–108)
CO2 SERPL-SCNC: 25 MMOL/L (ref 21–32)
CREAT SERPL-MCNC: 12.46 MG/DL (ref 0.6–1.3)
ERYTHROCYTE [DISTWIDTH] IN BLOOD BY AUTOMATED COUNT: 13.8 % (ref 11.6–15.1)
GFR SERPL CREATININE-BSD FRML MDRD: 4 ML/MIN/1.73SQ M
GLUCOSE SERPL-MCNC: 121 MG/DL (ref 65–140)
GLUCOSE SERPL-MCNC: 135 MG/DL (ref 65–140)
GLUCOSE SERPL-MCNC: 154 MG/DL (ref 65–140)
GLUCOSE SERPL-MCNC: 76 MG/DL (ref 65–140)
GLUCOSE SERPL-MCNC: 82 MG/DL (ref 65–140)
HCT VFR BLD AUTO: 22.8 % (ref 36.5–49.3)
HGB BLD-MCNC: 7.2 G/DL (ref 12–17)
MAGNESIUM SERPL-MCNC: 1.9 MG/DL (ref 1.9–2.7)
MCH RBC QN AUTO: 28.6 PG (ref 26.8–34.3)
MCHC RBC AUTO-ENTMCNC: 31.6 G/DL (ref 31.4–37.4)
MCV RBC AUTO: 91 FL (ref 82–98)
PHOSPHATE SERPL-MCNC: 9.5 MG/DL (ref 2.7–4.5)
PLATELET # BLD AUTO: 307 THOUSANDS/UL (ref 149–390)
PMV BLD AUTO: 8.5 FL (ref 8.9–12.7)
POTASSIUM SERPL-SCNC: 5.4 MMOL/L (ref 3.5–5.3)
RBC # BLD AUTO: 2.52 MILLION/UL (ref 3.88–5.62)
SODIUM SERPL-SCNC: 128 MMOL/L (ref 135–147)
WBC # BLD AUTO: 7.58 THOUSAND/UL (ref 4.31–10.16)

## 2024-06-25 PROCEDURE — 82948 REAGENT STRIP/BLOOD GLUCOSE: CPT

## 2024-06-25 PROCEDURE — 90935 HEMODIALYSIS ONE EVALUATION: CPT | Performed by: INTERNAL MEDICINE

## 2024-06-25 PROCEDURE — 99232 SBSQ HOSP IP/OBS MODERATE 35: CPT | Performed by: INTERNAL MEDICINE

## 2024-06-25 PROCEDURE — 80048 BASIC METABOLIC PNL TOTAL CA: CPT | Performed by: STUDENT IN AN ORGANIZED HEALTH CARE EDUCATION/TRAINING PROGRAM

## 2024-06-25 PROCEDURE — 83735 ASSAY OF MAGNESIUM: CPT | Performed by: INTERNAL MEDICINE

## 2024-06-25 PROCEDURE — 99233 SBSQ HOSP IP/OBS HIGH 50: CPT | Performed by: INTERNAL MEDICINE

## 2024-06-25 PROCEDURE — 85027 COMPLETE CBC AUTOMATED: CPT | Performed by: STUDENT IN AN ORGANIZED HEALTH CARE EDUCATION/TRAINING PROGRAM

## 2024-06-25 PROCEDURE — 84100 ASSAY OF PHOSPHORUS: CPT | Performed by: INTERNAL MEDICINE

## 2024-06-25 RX ORDER — ISOSORBIDE MONONITRATE 60 MG/1
60 TABLET, EXTENDED RELEASE ORAL DAILY
Status: DISCONTINUED | OUTPATIENT
Start: 2024-06-26 | End: 2024-06-25

## 2024-06-25 RX ORDER — SEVELAMER HYDROCHLORIDE 800 MG/1
800 TABLET, FILM COATED ORAL
Status: DISCONTINUED | OUTPATIENT
Start: 2024-06-25 | End: 2024-06-26 | Stop reason: HOSPADM

## 2024-06-25 RX ORDER — ISOSORBIDE MONONITRATE 60 MG/1
60 TABLET, EXTENDED RELEASE ORAL DAILY
Status: DISCONTINUED | OUTPATIENT
Start: 2024-06-25 | End: 2024-06-26 | Stop reason: HOSPADM

## 2024-06-25 RX ADMIN — HEPARIN SODIUM 5000 UNITS: 5000 INJECTION, SOLUTION INTRAVENOUS; SUBCUTANEOUS at 21:33

## 2024-06-25 RX ADMIN — ATORVASTATIN CALCIUM 80 MG: 80 TABLET, FILM COATED ORAL at 12:44

## 2024-06-25 RX ADMIN — HEPARIN SODIUM 5000 UNITS: 5000 INJECTION, SOLUTION INTRAVENOUS; SUBCUTANEOUS at 05:43

## 2024-06-25 RX ADMIN — NYSTATIN 1 APPLICATION: 100000 POWDER TOPICAL at 12:52

## 2024-06-25 RX ADMIN — HEPARIN SODIUM 5000 UNITS: 5000 INJECTION, SOLUTION INTRAVENOUS; SUBCUTANEOUS at 14:34

## 2024-06-25 RX ADMIN — HYDROMORPHONE HYDROCHLORIDE 0.5 MG: 1 INJECTION, SOLUTION INTRAMUSCULAR; INTRAVENOUS; SUBCUTANEOUS at 03:10

## 2024-06-25 RX ADMIN — HYDROMORPHONE HYDROCHLORIDE 0.5 MG: 1 INJECTION, SOLUTION INTRAMUSCULAR; INTRAVENOUS; SUBCUTANEOUS at 17:21

## 2024-06-25 RX ADMIN — ONDANSETRON 4 MG: 2 INJECTION INTRAMUSCULAR; INTRAVENOUS at 05:43

## 2024-06-25 RX ADMIN — HYDROMORPHONE HYDROCHLORIDE 0.5 MG: 1 INJECTION, SOLUTION INTRAMUSCULAR; INTRAVENOUS; SUBCUTANEOUS at 12:50

## 2024-06-25 RX ADMIN — NYSTATIN 1 APPLICATION: 100000 POWDER TOPICAL at 17:21

## 2024-06-25 RX ADMIN — OXYCODONE HYDROCHLORIDE 5 MG: 5 TABLET ORAL at 10:07

## 2024-06-25 RX ADMIN — CYANOCOBALAMIN TAB 500 MCG 1000 MCG: 500 TAB at 12:44

## 2024-06-25 RX ADMIN — INSULIN LISPRO 2 UNITS: 100 INJECTION, SOLUTION INTRAVENOUS; SUBCUTANEOUS at 12:51

## 2024-06-25 RX ADMIN — ISOSORBIDE MONONITRATE 60 MG: 60 TABLET, EXTENDED RELEASE ORAL at 13:04

## 2024-06-25 RX ADMIN — METOPROLOL SUCCINATE 25 MG: 25 TABLET, EXTENDED RELEASE ORAL at 12:44

## 2024-06-25 RX ADMIN — HYDROMORPHONE HYDROCHLORIDE 0.5 MG: 1 INJECTION, SOLUTION INTRAMUSCULAR; INTRAVENOUS; SUBCUTANEOUS at 20:44

## 2024-06-25 RX ADMIN — HYDROMORPHONE HYDROCHLORIDE 0.5 MG: 1 INJECTION, SOLUTION INTRAMUSCULAR; INTRAVENOUS; SUBCUTANEOUS at 06:23

## 2024-06-25 RX ADMIN — HYDROMORPHONE HYDROCHLORIDE 0.5 MG: 1 INJECTION, SOLUTION INTRAMUSCULAR; INTRAVENOUS; SUBCUTANEOUS at 23:47

## 2024-06-25 RX ADMIN — OXYCODONE HYDROCHLORIDE 5 MG: 5 TABLET ORAL at 01:03

## 2024-06-25 RX ADMIN — SEVELAMER HYDROCHLORIDE 800 MG: 800 TABLET, FILM COATED PARENTERAL at 12:44

## 2024-06-25 RX ADMIN — SEVELAMER HYDROCHLORIDE 800 MG: 800 TABLET, FILM COATED PARENTERAL at 17:21

## 2024-06-25 RX ADMIN — Medication 5000 UNITS: at 12:44

## 2024-06-25 RX ADMIN — OXYCODONE HYDROCHLORIDE 5 MG: 5 TABLET ORAL at 22:40

## 2024-06-25 RX ADMIN — TORSEMIDE 40 MG: 20 TABLET ORAL at 12:44

## 2024-06-25 RX ADMIN — OXYCODONE HYDROCHLORIDE 5 MG: 5 TABLET ORAL at 16:00

## 2024-06-25 NOTE — OCCUPATIONAL THERAPY NOTE
OT TREATMENT       06/25/24 1133   Note Type   Note Type Cancelled Session   Cancel Reasons Patient off floor/hemodialysis   Licensure   [place] License Number  Liliya Kelley MS OTR/L [id number]

## 2024-06-25 NOTE — PLAN OF CARE
Pt. On HD treatment for 3.5 hours with a UF goal of 3-4 L as tolerated. Pt on a 2 k 2.5 rodri bath for a potassium of 5.4 on 06/25/24.  Problem: METABOLIC, FLUID AND ELECTROLYTES - ADULT  Goal: Electrolytes maintained within normal limits  Description: INTERVENTIONS:  - Monitor labs and assess patient for signs and symptoms of electrolyte imbalances  - Administer electrolyte replacement as ordered  - Monitor response to electrolyte replacements, including repeat lab results as appropriate  - Instruct patient on fluid and nutrition as appropriate  Outcome: Progressing  Goal: Fluid balance maintained  Description: INTERVENTIONS:  - Monitor labs   - Monitor I/O and WT  - Instruct patient on fluid and nutrition as appropriate  - Assess for signs & symptoms of volume excess or deficit  Outcome: Progressing

## 2024-06-25 NOTE — PROGRESS NOTES
[facility]  Progress Note  Name: Joaquin Frankel I  MRN: [id number]  Unit/Bed#: 4 [address] I Date of Admission: 6/3/2024   Date of Service: 6/25/2024 I Hospital Day: 22    Assessment & Plan   * Acute renal failure (ARF) (HCC)  Assessment & Plan  POA Cr 6.40 >11.36 > 9.46> 11.31 >8.97 >10.79    BL CR 1.3-1.4  Initiated on HD on 6/7  UA: Hematuria, proteinuria.  UA CR - 5280  Serologies: ANCA titer was negative, anti-GBM was negative.  JOSE ROBERTO/anti-double-stranded DNA was negative.  Kappa/lambda ratio was within normal limits.  Serum and urine immunofixation showed IgG kappa monoclonal bands.  Complement C3 and C4 were normal.  Beta-2 microglobulin was elevated.  CT: No evidence of hydronephrosis  D/W recs nephrology:  Suspect MGRS  Inpatient HD TTS schedule.    Plan for next dialysis tomorrow> OP HD MWF  At this time no evidence of renal recovery.  Continue dialysis schedule.  Continue Demadex  F/U bone marrow biopsies/renal biopsy      Coronary artery disease with angina pectoris (HCC)  Assessment & Plan  Patient with a history of CAD with multivessel disease s/p stents.    PCI to the left circumflex in 2010, NSTEMI in November 2020 and PCI the RCA 12/1/2020   Held aspirin initially for biopsy, subsequently resumed.    Continue on Toprol-XL  Continue statin  D/W cardiology recs:  continue Lipitor 80 mg once a day  continue Toprol-XL 25 mg daily  Imdur increased to 60 mg daily.  Nuclear Stress test 6/24/24: fixed apical defect. SSS 3 SRS 1 SDS 2. No major reversible perfusion defect noted on this study.   D/w Cardiology and no Cardiac intervention and they d/w patient as well and recommended medical management as above.     Acute respiratory failure with hypoxia (HCC)  Assessment & Plan  Noted to have worsening hypoxia during hospitalization requiring MFNC  Likely multifactorial including volume overload, suspected underlying ANGY via OHS.  CT chest with evidence of bibasilar effusion with  compressive atelectasis  Continue ultrafiltration as tolerated  Continue supplemental oxygen as needed  Incentive spirometry  Patient states he has an upcoming appointment with a sleep specialist and ultimately will undergo a sleep study as an outpatient.  He has been told he has overnight hypoxia.  Ordered a Home O2 eval.     Wound of abdomen  Assessment & Plan  Seen by wound care, recommended general surgery consultation  Appreciate surgery input  Continue local wound care per wound care nurse  Follow-up in the outpatient wound center    Chronic acquired lymphedema  Assessment & Plan  Patient developed LLE lymphedema after left lower extremity surgical procedure several years ago.  Patient complaining of increasing pain in the left lower extremity likely secondary to volume overload.  Left lower extremity venous Doppler: Negative  Prior referrals ambulatory referral to lymphedema clinic.    Anemia of chronic disease  Assessment & Plan  Patient has anemia of chronic disease likely secondary to underlying nephropathy,?  Myeloma.      He did have some episodes of hematuria.  No further episodes. No further overt bleeding    HG down trended to 6.7 g/dL, s/p 2 PRBC on 6/11, 6/23  Hg 7.1  B12 310, continue supplementation  Iron panel shows iron deficiency.    S/P IV venofer 200mg EOD  transfuse for hemoglobin less than 7.0 or drastic drop in hemoglobin  May need EPO outpatient.     Morbid obesity (HCC)  Assessment & Plan  With BMI of 64.42  Encourage lifestyle modification on discharge    Hyponatremia  Assessment & Plan  Na-131  2/2 Vollume overload  Continue on HD  D/W nephrology recs:  Per nephro overload nonconcerning  Fluid restrict  HD    Diabetes (HCC)  Assessment & Plan  Lab Results   Component Value Date    HGBA1C 5.7 (H) 06/09/2024     Recent Labs     06/23/24  1938 06/24/24  0738 06/24/24  1141 06/24/24  1615   POCGLU 128 87 103 92     Blood Sugar Average: Last 72 hrs:  (P) [id number]    Amsusan held  during his hospitalization.   Acceptable, continue to monitor on current regimen  Continue diabetic diet.      Hypertension  Assessment & Plan  Held amlodipine and carvedilol for low blood pressure initially  Requiring midodrine predialysis as needed  Blood pressure stable/improving at present  Nephro recs following:    Started Toprol-XL 25 mg daily  Started on torsemide 40 mg daily per nephrology  Also on Imdur 60 mg daily.   Monitor intake output, daily weight  Monitor blood pressure    SIRS (systemic inflammatory response syndrome) (HCC)-resolved as of 6/21/2024  Assessment & Plan  CT left lower extremity-extensive subcutaneous edema and skin thickening  CT CAP-without any acute abnormality  BCx negative, MRSA surveillance negative  Noted to left lower extremity cellulitis superimposed on lymphedema, now improved  Source likely recurrent draining abscess under the pannus on 6/13.    Cx: Of beta-hemolytic Streptococcus group C  S/P I&D of the pannus by surgery on 6/13.    Continue IV cefazolin post HD, > 6/18  Negative venous Doppler    Chest pain-resolved as of 6/15/2024  Assessment & Plan  POA.  Denies chest discomfort at present  VQ scan low suspicion for PE  Echo with normal EF and grade 1 diastolic dysfunction  Cardiology input appreciated                 Mobility:     Basic Mobility Inpatient Raw Score: 20  JH-HLM Goal: 6: Walk 10 steps or more  JH-HLM Achieved: 6: Walk 10 steps or more  HLM Goal achieved. Continue to encourage appropriate mobility.    Pharmacologic VTE Prophylaxis: Yes   Mechanical VTE Prophylaxis in Place: No   Patient Centered Rounds: I have performed bedside rounds with the Nursing staff today.   Current Length of Stay: 22 day(s)  Current Patient Status: Inpatient   Code Status: Level 1 - Full Code  Time Spent for Care:  35 minutes.  More than 50% of total time spent on counseling and coordination of care as described above.  Discussions with Specialists or Other Care Team Provider: Yes  Cardiology, Nephrology.   Education and Discussions with Family / Patient: No, Patient refused.   Discharge Plan: 24-48 hrs.   Case Discussed with  regarding updating plan of care and disposition planning.   Certification Statement: The patient will continue to require additional inpatient hospital stay due to CLOVIS on CKD on HD, CAD,    Subjective:   I have seen and Examined the patient at the bedside.  Patient complains of some chest pain on exertion especially.  Currently getting hemodialysis.  Denies any chest pain currently.  No orthopnea or PND.  On and off has been noted to have hypoxia at rest on room air.  However sometimes he is back again in the 94 range.  But sometimes dips into the mid 80s.  Has been told he needs to follow-up with sleep study as an outpatient and had a appointment scheduled in July.  Currently denies any cough or cold or fevers or chills or any nausea or vomiting.  Appetite stable.  Able to ambulate but has some episodes of chest pain.    Review of System:  Denies any Cough or Cold  Denies any Fevers or chills.   Denies any focal tingling numbness or weakness in any extremities.   Denies any abdominal pain, Nausea or vomiting.     Objective:   Temp (24hrs), Av °F (36.7 °C), Min:97.3 °F (36.3 °C), Max:98.5 °F (36.9 °C)    Temp:  [97.3 °F (36.3 °C)-98.5 °F (36.9 °C)] 98.4 °F (36.9 °C)  HR:  [58-71] 71  Resp:  [18] 18  BP: (123-164)/(63-90) 146/90  SpO2:  [91 %-97 %] 91 %  Body mass index is 63.06 kg/m².     Input and Output Summary (last 24 hours):     Intake/Output Summary (Last 24 hours) at 2024 1432  Last data filed at 2024 1200  Gross per 24 hour   Intake 830 ml   Output 4250 ml   Net -3420 ml     I/O          07 0700  07 0700  07 0700    P.O.  600     I.V. (mL/kg)   500 (2.7)    Blood 321.7      Other   150    Total Intake(mL/kg) 321.7 (1.8) 600 (3.3) 650 (3.6)    Urine (mL/kg/hr)  100 (0)     Other   4150    Total  Output  100 4150    Net +321.7 +500 -3500                   Physical Exam:   General : Alert, Awake and oriented x 3, NAD.  Morbidly obese.  Neck : Supple.  Short neck.  Eyes:  KARLA, EOMI.   CVS : S1, S2, RRR.   R/S : Clear to auscultate anteriorly.  Decreased breath sound at bases bilaterally.  Abd: Soft, NT, ND. Bs+ve  Extremity and skin exam: Over bilateral lower extremity lymphedema noted t left lower extremity has significant lymphedema chronically, lateral extremity does have lymphedema but also some pitting edema noted.  Bilateral extremity chronic venous stasis changes noted.    CNS: No acute FND.     Additional Data:     Labs, Culture & Imaging Data Reviewed:    Results from last 7 days   Lab Units 06/25/24  0547   WBC Thousand/uL 7.58   HEMOGLOBIN g/dL 7.2*   HEMATOCRIT % 22.8*   PLATELETS Thousands/uL 307     Results from last 7 days   Lab Units 06/25/24  0547   POTASSIUM mmol/L 5.4*   CHLORIDE mmol/L 92*   CO2 mmol/L 25   BUN mg/dL 57*   CREATININE mg/dL 12.46*   CALCIUM mg/dL 8.9         Lab Results   Component Value Date    HGBA1C 5.7 (H) 06/09/2024       VAS VENOUS DUPLEX - LOWER LIMB BILATERAL   Final Result by Raúl Ortega MD (06/17 2012)      IR tunneled central line placement   Final Result by Radames Liu MD (06/14 1626)   Successful placement of 15.5 Tamazight by 32 cm right chest/right internal jugular vein tunneled hemodialysis catheter. The tip terminates in the right atrium. The catheter is ready for immediate use.      Workstation performed: [id number]         IR biopsy bone marrow   Final Result by Geovani Rodarte MD (06/13 0953)   Bone marrow aspiration and biopsy.      Workstation performed: [id number]         XR chest portable ICU   Final Result by Daryl Mccarthy MD (06/11 0811)      1.  Right internal jugular dialysis catheter projects over the SVC. No pneumothorax.      2.  Cardiomegaly with vascular congestion.            Workstation performed:  [id number]         CT lower extremity wo contrast left   Final Result by Jimbo Camacho MD (06/09 2045)      Extensive subcutaneous edema and skin thickening as described above. This may either reflect bland edema or extensive cellulitis. A well-defined rim-enhancing fluid collection not seen to suggest a discrete abscess. No soft tissue gas.      Osteoarthrosis as above. No osseous erosion.         Workstation performed: [id number]         CT chest abdomen pelvis wo contrast   Final Result by Allen Mcgowan MD (06/10 0804)   1. Small left greater than right basilar effusions with associated compressive atelectasis. Lungs otherwise clear.   2. Periaortic retroperitoneal, left greater than right iliac chain and inguinal lymph nodes identified, stable and nonspecific.   3. No acute findings.                     Workstation performed: [id number]         XR chest portable   Final Result by Danyell Ford MD (06/09 2240)      Mild pulmonary venous congestion.      Nothing to suggest pneumonia.            Workstation performed: [id number]         IR temporary dialysis catheter placement   Final Result by Radames Liu MD (06/07 1627)   1. Uncomplicated nontunneled/temporary dialysis catheter placement (14 [place] by 24 cm). Catheter tip terminates in the right atrium. The catheter is ready for immediate use.      2. Impaired hemostasis was discussed with Dr. Timmons of Nephrology. DDAVP will be administered to help mitigate rebleeding risk.      Workstation performed: [id number]         CT chest abdomen pelvis wo contrast   Final Result by Margoth Arango MD (06/03 1555)      No acute pulmonary pathology.      No acute abdominal or pelvic pathology within limitation of a noncontrast study. In particular, no hydronephrosis.      Left inguinal and left external iliac lymphadenopathy, increased from May 2022. This may be reactive to a process in the left lower extremity or in the partially imaged pannus.     "  Additional findings as above.                  Workstation performed: [id number]         NM lung ventilation / perfusion   Final Result by Daniel Ackerman MD (06/03 1412)      The probability for pulmonary embolus is low.      Workstation performed: [id number]         XR chest 1 view portable   ED Interpretation by Kit Antoine MD (06/03 0753)   Mild pulmonary edema right worse than left, no additional acute cardiopulmonary abnormality      Final Result by Miah Toledo MD (06/03 0841)      No acute cardiopulmonary disease.            Workstation performed: [id number]             Cultures:   Blood Culture:   Lab Results   Component Value Date    BLOODCX No Growth After 5 Days. 06/07/2024    BLOODCX No Growth After 5 Days. 06/07/2024     Urine Culture:   Lab Results   Component Value Date    URINECX No Growth <1000 cfu/mL 01/26/2024     Sputum Culture: No components found for: \"SPUTUMCX\"  Wound Culture:   Lab Results   Component Value Date    WOUNDCULT 2+ Growth of Beta Hemolytic Streptococcus Group C (A) 06/13/2024    WOUNDCULT 1+ Growth of 06/13/2024       Last 24 Hours Medication List:   Current Facility-Administered Medications   Medication Dose Route Frequency Provider Last Rate    atorvastatin  80 mg Oral Daily Holly Bush PETRA Sifuentes      Cholecalciferol  5,000 Units Oral Daily Holly PETRA Shell      cyanocobalamin  1,000 mcg Oral Daily Holly Bush Bear, PETRA      docusate sodium  100 mg Oral BID Holly PETRA Shell      heparin (porcine)  5,000 Units Subcutaneous Q8H SHAZIA PETRA Briceño      HYDROmorphone  0.5 mg Intravenous Q3H PRN Alyssa Levy MD      insulin lispro  1-6 Units Subcutaneous HS HollyPETRA Avalos      insulin lispro  2-12 Units Subcutaneous TID AC PETRA Briceño      iron sucrose  200 mg Intravenous Once per day on Monday Wednesday Friday Alyssa Levy  mg (06/23/24 5813)    " isosorbide mononitrate  60 mg Oral Daily Monique Alfred PA-C      lidocaine  1 patch Topical Daily PETRA Briceño      metoprolol succinate  25 mg Oral Daily Jose De Jesus Cardozo PA-C      nitroglycerin  0.4 mg Sublingual Q5 Min PRN Alyssa Levy MD      nystatin  1 Application Topical BID PETRA Briceño      ondansetron  4 mg Intravenous Q6H PRN PETRA Briceño      ondansetron  4 mg Intravenous Daily Alyssa Levy MD      oxyCODONE  5 mg Oral Q6H PRN Dennis Waterman MD      polyethylene glycol  17 g Oral Daily PRN PETRA Briceño      senna  1 tablet Oral HS PETRA Briceño      sevelamer  800 mg Oral TID With Meals Kriss Velazco MD      torsemide  40 mg Oral Daily Kylie Kendall MD           Patient is at moderate risk for morbidity and mortality due to above mentioned illness and comorbidities.

## 2024-06-25 NOTE — PLAN OF CARE
Problem: Prexisting or High Potential for Compromised Skin Integrity  Goal: Skin integrity is maintained or improved  Description: INTERVENTIONS:  - Identify patients at risk for skin breakdown  - Assess and monitor skin integrity  - Assess and monitor nutrition and hydration status  - Monitor labs   - Assess for incontinence   - Turn and reposition patient  - Assist with mobility/ambulation  - Relieve pressure over bony prominences  - Avoid friction and shearing  - Provide appropriate hygiene as needed including keeping skin clean and dry  - Evaluate need for skin moisturizer/barrier cream  - Collaborate with interdisciplinary team   - Patient/family teaching  - Consider wound care consult   Outcome: Progressing     Problem: PAIN - ADULT  Goal: Verbalizes/displays adequate comfort level or baseline comfort level  Description: Interventions:  - Encourage patient to monitor pain and request assistance  - Assess pain using appropriate pain scale  - Administer analgesics based on type and severity of pain and evaluate response  - Implement non-pharmacological measures as appropriate and evaluate response  - Consider cultural and social influences on pain and pain management  - Notify physician/advanced practitioner if interventions unsuccessful or patient reports new pain  Outcome: Progressing     Problem: INFECTION - ADULT  Goal: Absence or prevention of progression during hospitalization  Description: INTERVENTIONS:  - Assess and monitor for signs and symptoms of infection  - Monitor lab/diagnostic results  - Monitor all insertion sites, i.e. indwelling lines, tubes, and drains  - [place] appropriate cooling/warming therapies per order  - Administer medications as ordered  - Instruct and encourage patient and family to use good hand hygiene technique  - Identify and instruct in appropriate isolation precautions for identified infection/condition  Outcome: Progressing     Problem: SAFETY ADULT  Goal: Patient will  remain free of falls  Description: INTERVENTIONS:  - Educate patient/family on patient safety including physical limitations  - Instruct patient to call for assistance with activity   - Consult OT/PT to assist with strengthening/mobility   - Keep Call bell within reach  - Keep bed low and locked with side rails adjusted as appropriate  - Keep care items and personal belongings within reach  - Initiate and maintain comfort rounds  - Make Fall Risk Sign visible to staff  - Apply yellow socks and bracelet for high fall risk patients  - Consider moving patient to room near nurses station  Outcome: Progressing  Goal: Maintain or return to baseline ADL function  Description: INTERVENTIONS:  -  Assess patient's ability to carry out ADLs; assess patient's baseline for ADL function and identify physical deficits which impact ability to perform ADLs (bathing, care of mouth/teeth, toileting, grooming, dressing, etc.)  - Assess/evaluate cause of self-care deficits   - Assess range of motion  - Assess patient's mobility; develop plan if impaired  - Assess patient's need for assistive devices and provide as appropriate  - Encourage maximum independence but intervene and supervise when necessary  - Involve family in performance of ADLs  - Assess for home care needs following discharge   - Consider OT consult to assist with ADL evaluation and planning for discharge  - Provide patient education as appropriate  Outcome: Progressing  Goal: Maintains/Returns to pre admission functional level  Description: INTERVENTIONS:  - Perform AM-PAC 6 Click Basic Mobility/ Daily Activity assessment daily.  - Set and communicate daily mobility goal to care team and patient/family/caregiver.   - Collaborate with rehabilitation services on mobility goals if consulted  - Out of bed for toileting  - Record patient progress and toleration of activity level   Outcome: Progressing     Problem: CARDIOVASCULAR - ADULT  Goal: Maintains optimal cardiac output  and hemodynamic stability  Description: INTERVENTIONS:  - Monitor I/O, vital signs and rhythm  - Monitor for S/S and trends of decreased cardiac output  - Administer and titrate ordered vasoactive medications to optimize hemodynamic stability  - Assess quality of pulses, skin color and temperature  - Assess for signs of decreased coronary artery perfusion  - Instruct patient to report change in severity of symptoms  Outcome: Progressing  Goal: Absence of cardiac dysrhythmias or at baseline rhythm  Description: INTERVENTIONS:  - Continuous cardiac monitoring, vital signs, obtain 12 lead EKG if ordered  - Administer antiarrhythmic and heart rate control medications as ordered  - Monitor electrolytes and administer replacement therapy as ordered  Outcome: Progressing     Problem: RESPIRATORY - ADULT  Goal: Achieves optimal ventilation and oxygenation  Description: INTERVENTIONS:  - Assess for changes in respiratory status  - Assess for changes in mentation and behavior  - Position to facilitate oxygenation and minimize respiratory effort  - Oxygen administered by appropriate delivery if ordered  - Initiate smoking cessation education as indicated  - Encourage broncho-pulmonary hygiene including cough, deep breathe, Incentive Spirometry  - Assess the need for suctioning and aspirate as needed  - Assess and instruct to report SOB or any respiratory difficulty  - Respiratory Therapy support as indicated  Outcome: Progressing     Problem: SKIN/TISSUE INTEGRITY - ADULT  Goal: Incision(s), wounds(s) or drain site(s) healing without S/S of infection  Description: INTERVENTIONS  - Assess and document dressing, incision, wound bed, drain sites and surrounding tissue  - Provide patient and family education  - Perform skin care/dressing changes    Problem: NEUROSENSORY - ADULT  Goal: Achieves maximal functionality and self care  Description: INTERVENTIONS  - Monitor swallowing and airway patency with patient fatigue and changes  in neurological status  - Encourage and assist patient to increase activity and self care.   - Encourage visually impaired, hearing impaired and aphasic patients to use assistive/communication devices  Outcome: Progressing     Problem: METABOLIC, FLUID AND ELECTROLYTES - ADULT  Goal: Electrolytes maintained within normal limits  Description: INTERVENTIONS:  - Monitor labs and assess patient for signs and symptoms of electrolyte imbalances  - Administer electrolyte replacement as ordered  - Monitor response to electrolyte replacements, including repeat lab results as appropriate  - Instruct patient on fluid and nutrition as appropriate  Outcome: Progressing  Goal: Fluid balance maintained  Description: INTERVENTIONS:  - Monitor labs   - Monitor I/O and WT  - Instruct patient on fluid and nutrition as appropriate  - Assess for signs & symptoms of volume excess or deficit  Outcome: Progressing  Goal: Glucose maintained within target range  Description: INTERVENTIONS:  - Monitor Blood Glucose as ordered  - Assess for signs and symptoms of hyperglycemia and hypoglycemia  - Administer ordered medications to maintain glucose within target range  - Assess nutritional intake and initiate nutrition service referral as needed  Outcome: Progressing     Problem: MUSCULOSKELETAL - ADULT  Goal: Maintain or return mobility to safest level of function  Description: INTERVENTIONS:  - Assess patient's ability to carry out ADLs; assess patient's baseline for ADL function and identify physical deficits which impact ability to perform ADLs (bathing, care of mouth/teeth, toileting, grooming, dressing, etc.)  - Assess/evaluate cause of self-care deficits   - Assess range of motion  - Assess patient's mobility  - Assess patient's need for assistive devices and provide as appropriate  - Encourage maximum independence but intervene and supervise when necessary  - Involve family in performance of ADLs  - Assess for home care needs following  discharge   - Consider OT consult to assist with ADL evaluation and planning for discharge  - Provide patient education as appropriate  Outcome: Progressing  Goal: Maintain proper alignment of affected body part  Description: INTERVENTIONS:  - Support, maintain and protect limb and body alignment  - Provide patient/ family with appropriate education  Outcome: Progressing

## 2024-06-25 NOTE — PROGRESS NOTES
NEPHROLOGY HOSPITAL PROGRESS NOTE   Joaquin Frankel 44 y.o. male MRN: [id number]  Unit/Bed#: [address] [place] [address] Encounter: [id number]  Reason for Consult: CLOVIS present on admission    ASSESSMENT and PLAN:    44-year-old male with a past medical history of CKD stage III, diabetes, hypertension who initially presents with shortness of breath and chest pain.  Nephrology on board for acute kidney injury.     1-acute kidney injury-present on admission     - Prior baseline creatinine 1.3 to 1.6 mg/dL with episodes of acute kidney injury  - Admission creatinine 6 mg/dL  - Peak creatinine 10.4 mg/dL  - Initially started on intermittent dialysis on June 7.  Subsequently on June 9 the patient was transition to CRRT temporarily due to hypotension.  And then transition back to intermittent hemodialysis on Tori 10 due to hemodynamic stability.  - June 25-dialysis.  Sodium borderline low for volume overload and will monitor her.  Phosphorus elevated 9.5.  Start phosphorus binders.  Potassium elevated and will dialyze today.     Serological workup  - ANCA-negative  - Anti-GBM negative  - JOSE ROBERTO negative  - Anti-double-stranded DNA negative  - Kappa/lambda ratio normal limits  - IgG kappa monoclonal band on SPEP and UPEP  - C3 and C4 were normal  - Beta-2 microglobulin was elevated     Renal imaging-no evidence of hydronephrosis     Dialysis access-tunneled dialysis catheter placed June 14     Etiology-bone marrow biopsy results are pending.  There is concern for possible multiple myeloma.  If confirmed, then we will hold on renal biopsy.  If bone marrow biopsy is unrevealing, patient may ultimately require renal biopsy but currently is not safe for renal biopsy due to significant volume overload and may need to complete as outpatient     Plan  - Dialysis today-patient seen and examined on dialysis.  Sodium 138, bicarbonate 35, F1 80 filter, blood flow rate 350, 3.5-hour treatment.  - Next dialysis tomorrow Wednesday-and the patient will  be on MWF schedule  - Start phosphorus binders  - Low potassium diet  - Noted patient will be on dialysis as outpatient at [facility] schedule  - Monitor for renal recovery  - Pending bone marrow biopsy  - Dressing change on catheter site, noted to have very mild/minimal erythema at the entrance site.  No pain.  No drainage.  Will monitor closely for now.  If erythema worsens, will need antibiotics.    2-electrolytes-hyponatremia-likely in the setting of volume overload.  Monitor with ultrafiltration.     3-acid/base-appropriate to monitor with dialysis     7-unpaup-gomojb-up bone marrow biopsy.  SPEP and UPEP positive for IgG kappa monoclonal bands.  On IV iron     - Defer JOSSELYN to hematology team     5-hypertension-monitor with ultrafiltration and current regimen.  Further management in progress by cardiology team.  Imdur started 6/24.  Blood pressures improving.     6-MBD-secondary hyperparathyroidism with hyperphosphatemia.  Monitor with current regimen     7-chest pain with exertion-per primary team.  Stress test completed with fixed defect.  Current medical management being recommended per cardiology team.       SUBJECTIVE / 24H INTERVAL HISTORY:  Blood pressure is overall improving 140 systolic.  Afebrile.  On room air.  Patient seen while on dialysis this morning.  Had chest pain again this morning.  Standing scale weight slightly higher 183 kg.    OBJECTIVE:  Current Weight: Weight - Scale: (!) 183 kg (402 lb 9.6 oz)  Vitals:    06/25/24 0815 06/25/24 0830 06/25/24 0900 06/25/24 0930   BP: 144/71 148/81 145/79 142/75   BP Location: Right arm      Pulse: 64 58 67 63   Resp: 18 18 18 18   Temp: 97.8 °F (36.6 °C)      TempSrc: Tympanic      SpO2:       Weight:       Height:           Intake/Output Summary (Last 24 hours) at 6/25/2024 0950  Last data filed at 6/25/2024 0830  Gross per 24 hour   Intake 560 ml   Output 100 ml   Net 460 ml     General: NAD  Skin: no rash  Eyes: anicteric sclera  ENT: moist  mucous membrane  Neck: supple  Chest: CTA b/l, no ronchii, no wheeze, no rubs, no rales  CVS: s1s2, no murmur, no gallop, no rub  Abdomen: soft, nontender, nl sounds  Extremities: 2-3+ unchanged lower extremity edema, tunneled dialysis catheter site with very minimal erythema noted  : no lewis  Neuro: AAOX3  Psych: normal affect    Medications:    Current Facility-Administered Medications:     atorvastatin (LIPITOR) tablet 80 mg, 80 mg, Oral, Daily, PETRA Briceño, 80 mg at 06/24/24 0805    Cholecalciferol (VITAMIN D3) tablet 5,000 Units, 5,000 Units, Oral, Daily, PETRA Briceño, 5,000 Units at 06/24/24 0805    cyanocobalamin (VITAMIN B-12) tablet 1,000 mcg, 1,000 mcg, Oral, Daily, Holly Sifuentes, PETRA, 1,000 mcg at 06/24/24 0805    docusate sodium (COLACE) capsule 100 mg, 100 mg, Oral, BID, PETRA Briceño, 100 mg at 06/24/24 0805    heparin (porcine) subcutaneous injection 5,000 Units, 5,000 Units, Subcutaneous, Q8H SHAZIA, PETRA Briceño, 5,000 Units at 06/25/24 0543    HYDROmorphone (DILAUDID) injection 0.5 mg, 0.5 mg, Intravenous, Q3H PRN, Alyssa Levy MD, 0.5 mg at 06/25/24 0623    insulin lispro (HumALOG/ADMELOG) 100 units/mL subcutaneous injection 1-6 Units, 1-6 Units, Subcutaneous, HS, PETRA Briceño, 1 Units at 06/06/24 2150    insulin lispro (HumALOG/ADMELOG) 100 units/mL subcutaneous injection 2-12 Units, 2-12 Units, Subcutaneous, TID AC, 2 Units at 06/20/24 1618 **AND** Fingerstick Glucose (POCT), , , TID AC, PETRA Briceño    iron sucrose (VENOFER) 200 mg in sodium chloride 0.9 % 100 mL IVPB, 200 mg, Intravenous, Once per day on Monday Wednesday Friday, Alyssa Levy MD, Last Rate: 100 mL/hr at 06/23/24 1323, 200 mg at 06/23/24 1323    isosorbide mononitrate (IMDUR) 24 hr tablet 30 mg, 30 mg, Oral, Daily, PETRA Orlando, 30 mg at 06/24/24 0805    lidocaine (LIDODERM) 5 % patch 1  patch, 1 patch, Topical, Daily, PETRA Briceño, 1 patch at 06/17/24 0857    metoprolol succinate (TOPROL-XL) 24 hr tablet 25 mg, 25 mg, Oral, Daily, Jose De Jesus Cardozo PA-C, 25 mg at 06/24/24 0805    midodrine (PROAMATINE) tablet 5 mg, 5 mg, Oral, Before Dialysis, PETRA Briceño, 5 mg at 06/11/24 1317    nitroglycerin (NITROSTAT) SL tablet 0.4 mg, 0.4 mg, Sublingual, Q5 Min PRN, Alyssa Levy MD    nystatin (MYCOSTATIN) powder 1 Application, 1 Application, Topical, BID, PETRA Briceño, 1 Application at 06/24/24 1756    ondansetron (ZOFRAN) injection 4 mg, 4 mg, Intravenous, Q6H PRN, PETRA Briceño, 4 mg at 06/22/24 1015    ondansetron (ZOFRAN) injection 4 mg, 4 mg, Intravenous, Daily, Alyssa Levy MD, 4 mg at 06/25/24 0543    oxyCODONE (ROXICODONE) IR tablet 5 mg, 5 mg, Oral, Q6H PRN, Dennis Waterman MD, 5 mg at 06/25/24 0103    polyethylene glycol (MIRALAX) packet 17 g, 17 g, Oral, Daily PRN, PETRA Briceño    senna (SENOKOT) tablet 8.6 mg, 1 tablet, Oral, HS, PETRA Briceño, 8.6 mg at 06/23/24 2253    torsemide (DEMADEX) tablet 40 mg, 40 mg, Oral, Daily, Kylie Kendall MD, 40 mg at 06/24/24 0805    Laboratory Results:  Results from last 7 days   Lab Units 06/25/24  0547 06/24/24  0527 06/23/24  0619 06/22/24  0505 06/21/24  0531 06/20/24  0538 06/19/24  0307 06/19/24  0301   WBC Thousand/uL 7.58 6.68 6.79 7.82 8.30 9.65  --  12.82*   HEMOGLOBIN g/dL 7.2* 7.1* 7.0* 6.8* 6.6* 7.1*  --  8.0*   HEMATOCRIT % 22.8* 21.9* 22.0* 21.8* 21.0* 22.7*  --  25.2*   PLATELETS Thousands/uL 307 301 344 310 340 377  --  405*   POTASSIUM mmol/L 5.4* 5.2 4.6 5.0 4.8 5.0 4.7  --    CHLORIDE mmol/L 92* 94* 92* 91* 92* 91* 92*  --    CO2 mmol/L 25 25 26 26 26 24 25  --    BUN mg/dL 57* 47* 34* 54* 44* 57* 48*  --    CREATININE mg/dL 12.46* 10.79* 8.97* 11.31* 9.46* 11.36* 9.30*  --    CALCIUM mg/dL 8.9 8.4 8.4 8.7 8.2* 8.5 8.4  --     MAGNESIUM mg/dL 1.9  --   --   --   --   --   --   --    PHOSPHORUS mg/dL 9.5*  --   --   --   --   --   --   --

## 2024-06-25 NOTE — PROGRESS NOTES
Progress Note - Cardiology   [facility]     Joaquin Frankel 44 y.o. male MRN: [id number]  : 1980  Unit/Bed#: [address] [place] [address] Encounter: [id number]    Assessment and Plan:   Chest pain.  - Cardiology consulted due to patient's reports of chest discomfort with activity, chest comfort resolves with rest.  - 24 nuclear stress test: Abnormal study. Small predominantly fixed apical defect. SSS 3 SRS 1 SDS 2. No major reversible perfusion defect noted on this study. Elevated BP noted during the they study.   - 24 TTE: LVEF 58%. Diastolic function is mildly abnormal, consistent with grade I (abnormal) relaxation.  Bilateral atrium mildly dilated.  Mitral with mild regurgitation.  Tricuspid valve with mild regurgitation.  Right ventricle systolic pressure moderately elevated, 58 mmHg.  Pulmonic valve with mild regurgitation.  - Currently on Imdur 30 mg daily. Increase Imdur 30 mg daily to Imdur 60 mg daily for better BP control.  - Will plan for medical management at this time.     Coronary artery disease.  - s/p PCI with SABINE to mid RCA on 2020.  - 20 cardiac catheterization:     --  The coronary circulation is right dominant.  --  There was 1-vessel coronary artery disease.  --  Left main: Angiography showed minor luminal irregularities.  --  LAD: Angiography showed minor luminal irregularities.  --  Mid circumflex: There was a 30 % stenosis.  --  RCA: The vessel was excessively tortuous.  --  Proximal RCA: There was a 30 % stenosis.  --  Mid RCA: There was a 80 % stenosis.  --  Distal RCA: There was a 40 % stenosis.    - 24 nuclear stress test: Abnormal study. Small predominantly fixed apical defect. SSS 3 SRS 1 SDS 2. No major reversible perfusion defect noted on this study. Elevated BP noted during the they study.   - Continue Lipitor 80 mg daily.  - Continue Imdur 30 mg daily.  - Continue Toprol-XL 25 mg daily.    Acute renal failure.  -  Prior to admission  "patient with a history of CKD stage III.  Admission creatinine 6 mg/dL with peak to 10.4 mg/dL.   -Currently on HD on Tuesday/Thursday/Saturday.  -  Nephrology following.    Anemia of chronic disease.  - Hemoglobin ranges from 6.6-8.0 during his current hospitalization.  - Care per primary team.     Essential hypertension.  - SBP over 24 hours 142-166.  - Currently on Imdur 30 mg daily, Toprol-XL 25 mg daily, and torsemide 40 mg daily.   - Increase Imdur 30 mg daily to Imdur 60 mg daily for better BP control.  - Continue to monitor BP.    Dyslipidemia.  - 4/29/24 lipid panel: Cholesterol 184, triglycerides 198, HDL 20, .  - Currently on Lipitor 80 mg daily.    Chronic lower extremity lymphedema.  - Left lower extremity noticeably more edematous compared to right lower extremity.  - Recommend lower extremity compression and elevation.    Type II diabetes.   - 6/09/24 HgbA1c: 5.7.   - Care per primary team.     Class III obesity.  - BMI 63.06 kg/m2.   Subjective / Objective:         Patient states that he continues to have episodes of chest discomfort.  States that he had experienced chest discomfort after walking with PT yesterday.  Also reports episode of chest discomfort this morning after using the restroom.  Patient states that chest discomfort resolves with rest.  Denies chest discomfort is associate with any additional symptoms such as shortness of breath, palpitations, lightheadedness, dizziness, headache, nausea, vomiting or diaphoresis.      Patient is scheduled for dialysis today.    Vitals: Blood pressure 153/82, pulse 60, temperature 98.1 °F (36.7 °C), resp. rate 18, height 5' 7\" (1.702 m), weight (!) 183 kg (402 lb 9.6 oz), SpO2 91%.  Vitals:    06/24/24 0600 06/25/24 0600   Weight: (!) 182 kg (401 lb 12.8 oz) (!) 183 kg (402 lb 9.6 oz)     Body mass index is 63.06 kg/m².  BP Readings from Last 3 Encounters:   06/24/24 153/82   05/22/24 134/74   05/16/24 144/72     Orthostatic Blood Pressures  "     Flowsheet Row Most Recent Value   Blood Pressure 153/82 filed at 06/24/2024 2146   Patient Position - Orthostatic VS Lying filed at 06/22/2024 1631          I/O         06/23 0701  06/24 0700 06/24 0701  06/25 0700 06/25 0701  06/26 0700    P.O.  600     I.V. (mL/kg)       Blood 321.7      Total Intake(mL/kg) 321.7 (1.8) 600 (3.3)     Urine (mL/kg/hr)  100 (0)     Other       Total Output  100     Net +321.7 +500                  Invasive Devices       Peripheral Intravenous Line  Duration             Peripheral IV 06/23/24 Distal;Right;Upper;Ventral (anterior) Arm 1 day              Hemodialysis Catheter  Duration             HD Permanent Double Catheter 10 days                      Intake/Output Summary (Last 24 hours) at 6/25/2024 0720  Last data filed at 6/24/2024 1730  Gross per 24 hour   Intake 600 ml   Output 100 ml   Net 500 ml         Physical Exam:   Physical Exam  Vitals reviewed.   Constitutional:       General: He is not in acute distress.     Appearance: He is obese.   Cardiovascular:      Rate and Rhythm: Normal rate and regular rhythm.      Pulses: Normal pulses.      Heart sounds: Murmur heard.   Pulmonary:      Effort: Pulmonary effort is normal. No respiratory distress.      Breath sounds: Normal breath sounds.   Abdominal:      General: Abdomen is flat. There is no distension.      Palpations: Abdomen is soft.      Tenderness: There is no abdominal tenderness.   Musculoskeletal:      Right lower leg: No edema.      Left lower leg: Edema present.   Skin:     General: Skin is warm and dry.   Neurological:      Mental Status: He is alert and oriented to person, place, and time.       Medications/ Allergies:     Current Facility-Administered Medications   Medication Dose Route Frequency Provider Last Rate    atorvastatin  80 mg Oral Daily HollyPETRA Avalos      Cholecalciferol  5,000 Units Oral Daily PETRA Briceño      cyanocobalamin  1,000 mcg Oral Daily Holly  PETRA Shell      docusate sodium  100 mg Oral BID Holly PETRA Shell      heparin (porcine)  5,000 Units Subcutaneous Q8H SHAZIA PETRA Briceño      HYDROmorphone  0.5 mg Intravenous Q3H PRN Alyssa Levy MD      insulin lispro  1-6 Units Subcutaneous HS HollyPETRA Avalos      insulin lispro  2-12 Units Subcutaneous TID AC PETRA Briceño      iron sucrose  200 mg Intravenous Once per day on Monday Wednesday Friday Alyssa Levy  mg (06/23/24 1323)    isosorbide mononitrate  30 mg Oral Daily PETRA Orlando      lidocaine  1 patch Topical Daily HollyPETRA Avalos      metoprolol succinate  25 mg Oral Daily Jose De Jesus Cardozo PA-C      midodrine  5 mg Oral Before Dialysis Taylor PETRA Shell      nitroglycerin  0.4 mg Sublingual Q5 Min PRN Alyssa Levy MD      nystatin  1 Application Topical BID Taylor PETRA Shell      ondansetron  4 mg Intravenous Q6H PRN Holly PETRA Shell      ondansetron  4 mg Intravenous Daily Alyssa Levy MD      oxyCODONE  5 mg Oral Q6H PRN Dennis Waterman MD      polyethylene glycol  17 g Oral Daily PRN Holly PETRA Shell      senna  1 tablet Oral HS Taylor PETRA Shell      torsemide  40 mg Oral Daily Kylie Kendall MD       HYDROmorphone, 0.5 mg, Q3H PRN  midodrine, 5 mg, Before Dialysis  nitroglycerin, 0.4 mg, Q5 Min PRN  ondansetron, 4 mg, Q6H PRN  oxyCODONE, 5 mg, Q6H PRN  polyethylene glycol, 17 g, Daily PRN      Allergies   Allergen Reactions    Keflex [Cephalexin] Facial Swelling and Lip Swelling    Amoxicillin Hives     childhood       VTE Pharmacologic Prophylaxis:   Heparin    Labs:   Troponins:  Results from last 7 days   Lab Units 06/21/24 2037 06/21/24  1839   HSTNI D2 ng/L  --  2   HSTNI D4 ng/L 3  --          CBC with diff:  Results from last 7 days   Lab Units 06/25/24  0547 06/24/24  0527 06/23/24  0619  06/22/24  0505 06/21/24  0531 06/20/24  0538 06/19/24  0301   WBC Thousand/uL 7.58 6.68 6.79 7.82 8.30 9.65 12.82*   HEMOGLOBIN g/dL 7.2* 7.1* 7.0* 6.8* 6.6* 7.1* 8.0*   HEMATOCRIT % 22.8* 21.9* 22.0* 21.8* 21.0* 22.7* 25.2*   MCV fL 91 89 91 91 90 91 88   PLATELETS Thousands/uL 307 301 344 310 340 377 405*   RBC Million/uL 2.52* 2.45* 2.41* 2.41* 2.33* 2.49* 2.85*   MCH pg 28.6 29.0 29.0 28.2 28.3 28.5 28.1   MCHC g/dL 31.6 32.4 31.8 31.2* 31.4 31.3* 31.7   RDW % 13.8 13.9 14.2 14.1 14.1 14.3 14.3   MPV fL 8.5* 8.3* 8.8* 8.3* 8.8* 8.7* 8.4*   NRBC AUTO /100 WBCs  --  0 0 0 0 0  --        CMP:  Results from last 7 days   Lab Units 06/25/24  0547 06/24/24  0527 06/23/24  0619 06/22/24  0505 06/21/24  0531 06/20/24  0538 06/19/24  0307   SODIUM mmol/L 128* 131* 128* 127* 128* 128* 129*   POTASSIUM mmol/L 5.4* 5.2 4.6 5.0 4.8 5.0 4.7   CHLORIDE mmol/L 92* 94* 92* 91* 92* 91* 92*   CO2 mmol/L 25 25 26 26 26 24 25   ANION GAP mmol/L 11 12 10 10 10 13 12   BUN mg/dL 57* 47* 34* 54* 44* 57* 48*   CREATININE mg/dL 12.46* 10.79* 8.97* 11.31* 9.46* 11.36* 9.30*   CALCIUM mg/dL 8.9 8.4 8.4 8.7 8.2* 8.5 8.4   EGFR ml/min/1.73sq m 4 5 6 4 6 4 6       Magnesium:  Results from last 7 days   Lab Units 06/25/24  0547   MAGNESIUM mg/dL 1.9     Imaging & Testing   I have personally reviewed pertinent reports.       VAS VENOUS DUPLEX - LOWER LIMB BILATERAL    Result Date: 6/17/2024  Narrative:  [facility] REPORT CLINICAL: Indications: Patient presents with worsening bilateral lower extremity edema x a few weeks. Operative History: 2024-06-14 Right Tunneled central line Coronary angioplasty with stent placement Risk Factors The patient has history of Obesity, HTN, Diabetes, Hyperlipidemia, CAD, CKD, and previous smoking (quit 1-5yrs ago).   CONCLUSION:  Impression:  RIGHT LOWER LIMB: No gross evidence of acute or chronic deep vein thrombosis. No gross evidence of superficial thrombophlebitis noted. Doppler evaluation shows a  normal response to augmentation maneuvers. Popliteal, posterior tibial and anterior tibial arterial Doppler waveforms are triphasic  LEFT LOWER LIMB: No gross evidence of acute or chronic deep vein thrombosis. No gross evidence of superficial thrombophlebitis noted. Doppler evaluation shows a normal response to augmentation maneuvers. Popliteal, posterior tibial and anterior tibial arterial Doppler waveforms are triphasic/hyperemic.  Tech Note: There is an echogenic structure located in the left inguinal region measuring approximately 3.31cm suggestive of enlarged lymphatic channels.  Technical findings were faxed to chart. Technically difficult/limited study. Some segments may be poorly visualized on today's exam.  SIGNATURE: Electronically Signed by: ANDREA WINSTON on 2024-06-17 08:12:45 PM    IR tunneled central line placement    Result Date: 6/14/2024  Narrative: PROCEDURE: Nontunneled central venous catheter removal. Tunneled dialysis catheter placement. Procedural Personnel Attending physician(s): Radames Liu MD Pre-procedure diagnosis: Anuric CLOVIS on CKD on HD without evidence of renal recovery Post-procedure diagnosis: Same Clinical History: 44-year-old man with history of acute kidney injury on pre-existing chronic kidney disease, who had a temporary dialysis catheter placed by me on 6/6/2024. On 6/10/2024, a small tear/hole was noted in the catheter I had placed, and that catheter was removed. The critical care service placed a new temporary dialysis catheter on that date. He has had no evidence of renal recovery and requires transition to a tunneled dialysis catheter. PROCEDURE SUMMARY: - Removal of nontunneled central venous catheter - Right internal jugular vein access under real-time ultrasound guidance - Tunneled dialysis catheter placement PROCEDURE DETAILS: Pre-procedure Consent: Informed consent for the procedure including risks, benefits and alternatives was obtained and time-out  was performed prior to the procedure. Preparation: The site was prepared and draped using maximal sterile barrier technique including cutaneous antisepsis. Anesthesia/sedation Level of anesthesia/sedation: Moderate sedation (conscious sedation) Anesthesia/sedation administered by: Independent trained observer under attending supervision with continuous monitoring of the patient's level of consciousness and physiologic status Total intra-service sedation time: 1 hour Technique and Findings: Initial fluoroscopic image demonstrated a temporary dialysis catheter in place, with tip terminating in the mid superior vena cava. The access trajectory was not amenable to conversion to a tunneled catheter. The temporary dialysis catheter was removed and hemostasis obtained with gentle manual pressure. Ultrasound demonstrated a widely patent right internal jugular vein, appropriate for access. Local anesthetic was administered. A dermatotomy was made. Under real-time ultrasound guidance, a 21-gauge micropuncture needle was advanced into the right internal jugular vein from a lateral approach. A permanent image was saved. A microwire was advanced and the needle exchanged for the micropuncture transitional dilator. This access was secured with a flow switch. An appropriate length tunneled dialysis catheter was selected. Local anesthetic was administered along the planned tunnel tract. A dermatotomy was made on the chest. The catheter was tunneled from the chest incision to the right neck incision. Attention was returned to the vascular access. The microwire was exchanged for a 0.035 inch guidewire. The access site was serially dilated and a peel-away sheath was placed. The catheter was inserted via the sheath, which was then peeled away. Completion image demonstrated that the tip terminated in the right atrium. The catheter was checked and both lumens provided brisk flow for dialysis. The catheter was secured with nonabsorbable  suture, both lumens flushed and capped per protocol, and a sterile dressing was applied. Contrast None Radiation Dose Fluoroscopy time: 1.1 min Reference air kerma: 20 mGy Additional Details Specimens removed: None Estimated blood loss (mL): Less than 10 Complications: No immediate complications.     Impression: Successful placement of 15.5 [place] by 32 cm right chest/right internal jugular vein tunneled hemodialysis catheter. The tip terminates in the right atrium. The catheter is ready for immediate use. Workstation performed: [id number]     EKG / Monitor: Personally reviewed.      Not currently on telemetry.    Cardiac testing:     NM myocardial perfusion spect (rx stress and/or rest)  Result date: 6/24/24  Interpretation Summary    Stress ECG: The stress ECG is equivocal for ischemia after pharmacologic vasodilation.    Perfusion: There is a left ventricular perfusion defect that is small to medium in size present in the apical location(s) that is predominantly fixed.    Stress Combined Conclusion: Left ventricular perfusion is probably abnormal.    Stress Function: Left ventricular function post-stress is normal. Stress ejection fraction is 58%.    Perfusion Defect Conclusion: The stress/rest perfusion ratio is 1.10. There is no evidence of transient ischemic dilation (TID).    Abnormal study. Small predominantly fixed apical defect. SSS 3 SRS 1 SDS 2. No major reversible perfusion defect noted on this study. Elevated BP noted during the they study.    Echo complete with contrast if indicated  Result date: 6/04/24    Left Ventricle Left ventricular cavity size is normal. Wall thickness is moderately increased. There is moderate concentric hypertrophy. The left ventricular ejection fraction is 58% by visual estimation. Systolic function is normal. Wall motion is normal. Diastolic function is mildly abnormal, consistent with grade I (abnormal) relaxation.  Left atrial filling pressure is normal.   Right  Ventricle Right ventricular cavity size is normal. Systolic function is normal. Wall thickness is normal.   Left Atrium The atrium is mildly dilated.   Right Atrium The atrium is mildly dilated.   Aortic Valve The aortic valve was not well visualized. The leaflets are mildly thickened. The leaflets are mildly calcified. The leaflets exhibit normal mobility. There is no evidence of regurgitation. The aortic valve has no significant stenosis.   Mitral Valve Mitral valve structure is normal.  There is mild regurgitation. There is no evidence of stenosis.   Tricuspid Valve Tricuspid valve structure is normal. There is mild regurgitation. There is no evidence of stenosis. The right ventricular systolic pressure is moderately elevated. The estimated right ventricular systolic pressure is 58.00 mmHg.   Pulmonic Valve The pulmonic valve was not well visualized. Pulmonic valve structure is normal. There is mild regurgitation. There is no evidence of stenosis.   Ascending Aorta The aortic root is normal in size.   IVC/SVC The right atrial pressure is estimated at 8.0 mmHg. The inferior vena cava is normal in size.   Pericardium There is no pericardial effusion. The pericardium is normal in appearance.         Monique Alfred PA-C

## 2024-06-26 ENCOUNTER — APPOINTMENT (INPATIENT)
Dept: RADIOLOGY | Facility: HOSPITAL | Age: 44
DRG: 673 | End: 2024-06-26
Payer: COMMERCIAL

## 2024-06-26 ENCOUNTER — APPOINTMENT (INPATIENT)
Dept: DIALYSIS | Facility: HOSPITAL | Age: 44
DRG: 673 | End: 2024-06-26
Attending: INTERNAL MEDICINE
Payer: COMMERCIAL

## 2024-06-26 VITALS
TEMPERATURE: 97.9 F | BODY MASS INDEX: 49.44 KG/M2 | HEART RATE: 67 BPM | SYSTOLIC BLOOD PRESSURE: 142 MMHG | RESPIRATION RATE: 18 BRPM | OXYGEN SATURATION: 95 % | WEIGHT: 315 LBS | HEIGHT: 67 IN | DIASTOLIC BLOOD PRESSURE: 66 MMHG

## 2024-06-26 LAB
ABO GROUP BLD: NORMAL
ALBUMIN SERPL BCG-MCNC: 2.8 G/DL (ref 3.5–5)
ANION GAP SERPL CALCULATED.3IONS-SCNC: 16 MMOL/L (ref 4–13)
BASOPHILS # BLD AUTO: 0.07 THOUSANDS/ÂΜL (ref 0–0.1)
BASOPHILS NFR BLD AUTO: 1 % (ref 0–1)
BLD GP AB SCN SERPL QL: NEGATIVE
BUN SERPL-MCNC: 46 MG/DL (ref 5–25)
CALCIUM ALBUM COR SERPL-MCNC: 9.7 MG/DL (ref 8.3–10.1)
CALCIUM SERPL-MCNC: 8.7 MG/DL (ref 8.4–10.2)
CHLORIDE SERPL-SCNC: 89 MMOL/L (ref 96–108)
CO2 SERPL-SCNC: 26 MMOL/L (ref 21–32)
CREAT SERPL-MCNC: 10.53 MG/DL (ref 0.6–1.3)
EOSINOPHIL # BLD AUTO: 0.46 THOUSAND/ÂΜL (ref 0–0.61)
EOSINOPHIL NFR BLD AUTO: 6 % (ref 0–6)
ERYTHROCYTE [DISTWIDTH] IN BLOOD BY AUTOMATED COUNT: 13.9 % (ref 11.6–15.1)
GFR SERPL CREATININE-BSD FRML MDRD: 5 ML/MIN/1.73SQ M
GLUCOSE SERPL-MCNC: 115 MG/DL (ref 65–140)
GLUCOSE SERPL-MCNC: 142 MG/DL (ref 65–140)
GLUCOSE SERPL-MCNC: 85 MG/DL (ref 65–140)
GLUCOSE SERPL-MCNC: 87 MG/DL (ref 65–140)
HCT VFR BLD AUTO: 22.3 % (ref 36.5–49.3)
HGB BLD-MCNC: 7 G/DL (ref 12–17)
IMM GRANULOCYTES # BLD AUTO: 0.02 THOUSAND/UL (ref 0–0.2)
IMM GRANULOCYTES NFR BLD AUTO: 0 % (ref 0–2)
LYMPHOCYTES # BLD AUTO: 1.03 THOUSANDS/ÂΜL (ref 0.6–4.47)
LYMPHOCYTES NFR BLD AUTO: 14 % (ref 14–44)
MCH RBC QN AUTO: 28.3 PG (ref 26.8–34.3)
MCHC RBC AUTO-ENTMCNC: 31.4 G/DL (ref 31.4–37.4)
MCV RBC AUTO: 90 FL (ref 82–98)
MONOCYTES # BLD AUTO: 0.72 THOUSAND/ÂΜL (ref 0.17–1.22)
MONOCYTES NFR BLD AUTO: 10 % (ref 4–12)
NEUTROPHILS # BLD AUTO: 5.09 THOUSANDS/ÂΜL (ref 1.85–7.62)
NEUTS SEG NFR BLD AUTO: 69 % (ref 43–75)
NRBC BLD AUTO-RTO: 0 /100 WBCS
PHOSPHATE SERPL-MCNC: 7.9 MG/DL (ref 2.7–4.5)
PLATELET # BLD AUTO: 261 THOUSANDS/UL (ref 149–390)
PMV BLD AUTO: 8.2 FL (ref 8.9–12.7)
POTASSIUM SERPL-SCNC: 5.4 MMOL/L (ref 3.5–5.3)
RBC # BLD AUTO: 2.47 MILLION/UL (ref 3.88–5.62)
RH BLD: POSITIVE
SODIUM SERPL-SCNC: 131 MMOL/L (ref 135–147)
SPECIMEN EXPIRATION DATE: NORMAL
WBC # BLD AUTO: 7.39 THOUSAND/UL (ref 4.31–10.16)

## 2024-06-26 PROCEDURE — 86901 BLOOD TYPING SEROLOGIC RH(D): CPT | Performed by: INTERNAL MEDICINE

## 2024-06-26 PROCEDURE — 99232 SBSQ HOSP IP/OBS MODERATE 35: CPT | Performed by: INTERNAL MEDICINE

## 2024-06-26 PROCEDURE — 76882 US LMTD JT/FCL EVL NVASC XTR: CPT

## 2024-06-26 PROCEDURE — 82948 REAGENT STRIP/BLOOD GLUCOSE: CPT

## 2024-06-26 PROCEDURE — 99239 HOSP IP/OBS DSCHRG MGMT >30: CPT | Performed by: INTERNAL MEDICINE

## 2024-06-26 PROCEDURE — 86900 BLOOD TYPING SEROLOGIC ABO: CPT | Performed by: INTERNAL MEDICINE

## 2024-06-26 PROCEDURE — 80069 RENAL FUNCTION PANEL: CPT | Performed by: INTERNAL MEDICINE

## 2024-06-26 PROCEDURE — 85025 COMPLETE CBC W/AUTO DIFF WBC: CPT | Performed by: INTERNAL MEDICINE

## 2024-06-26 PROCEDURE — 86850 RBC ANTIBODY SCREEN: CPT | Performed by: INTERNAL MEDICINE

## 2024-06-26 RX ORDER — TORSEMIDE 20 MG/1
40 TABLET ORAL DAILY
Qty: 60 TABLET | Refills: 0 | Status: ON HOLD | OUTPATIENT
Start: 2024-06-27

## 2024-06-26 RX ORDER — LIDOCAINE 50 MG/G
1 PATCH TOPICAL DAILY
Qty: 30 PATCH | Refills: 0 | Status: ON HOLD | OUTPATIENT
Start: 2024-06-27 | End: 2024-07-05

## 2024-06-26 RX ORDER — NYSTATIN 100000 [USP'U]/G
1 POWDER TOPICAL 2 TIMES DAILY
Qty: 15 G | Refills: 0 | Status: ON HOLD | OUTPATIENT
Start: 2024-06-26

## 2024-06-26 RX ORDER — ISOSORBIDE MONONITRATE 60 MG/1
60 TABLET, EXTENDED RELEASE ORAL DAILY
Qty: 30 TABLET | Refills: 0 | Status: ON HOLD | OUTPATIENT
Start: 2024-06-27

## 2024-06-26 RX ORDER — RIBOFLAVIN (VITAMIN B2) 100 MG
100 TABLET ORAL DAILY
Qty: 30 TABLET | Refills: 0 | Status: ON HOLD | OUTPATIENT
Start: 2024-06-26

## 2024-06-26 RX ORDER — METOPROLOL SUCCINATE 25 MG/1
25 TABLET, EXTENDED RELEASE ORAL DAILY
Qty: 30 TABLET | Refills: 0 | Status: ON HOLD | OUTPATIENT
Start: 2024-06-27

## 2024-06-26 RX ORDER — DOCUSATE SODIUM 100 MG/1
100 CAPSULE, LIQUID FILLED ORAL 2 TIMES DAILY
Qty: 20 CAPSULE | Refills: 0 | Status: ON HOLD | OUTPATIENT
Start: 2024-06-26 | End: 2024-07-05

## 2024-06-26 RX ORDER — OXYCODONE HYDROCHLORIDE 10 MG/1
10 TABLET ORAL EVERY 4 HOURS PRN
Qty: 20 TABLET | Refills: 0 | Status: ON HOLD | OUTPATIENT
Start: 2024-06-26

## 2024-06-26 RX ORDER — SEVELAMER HYDROCHLORIDE 800 MG/1
800 TABLET, FILM COATED ORAL
Qty: 90 TABLET | Refills: 0 | Status: ON HOLD | OUTPATIENT
Start: 2024-06-26

## 2024-06-26 RX ORDER — FERROUS SULFATE 324(65)MG
324 TABLET, DELAYED RELEASE (ENTERIC COATED) ORAL
Qty: 60 TABLET | Refills: 0 | Status: ON HOLD | OUTPATIENT
Start: 2024-06-26

## 2024-06-26 RX ADMIN — SEVELAMER HYDROCHLORIDE 800 MG: 800 TABLET, FILM COATED PARENTERAL at 11:13

## 2024-06-26 RX ADMIN — NYSTATIN 1 APPLICATION: 100000 POWDER TOPICAL at 17:14

## 2024-06-26 RX ADMIN — ISOSORBIDE MONONITRATE 60 MG: 60 TABLET, EXTENDED RELEASE ORAL at 09:24

## 2024-06-26 RX ADMIN — METOPROLOL SUCCINATE 25 MG: 25 TABLET, EXTENDED RELEASE ORAL at 09:24

## 2024-06-26 RX ADMIN — OXYCODONE HYDROCHLORIDE 5 MG: 5 TABLET ORAL at 05:16

## 2024-06-26 RX ADMIN — HYDROMORPHONE HYDROCHLORIDE 0.5 MG: 1 INJECTION, SOLUTION INTRAMUSCULAR; INTRAVENOUS; SUBCUTANEOUS at 03:05

## 2024-06-26 RX ADMIN — HYDROMORPHONE HYDROCHLORIDE 0.5 MG: 1 INJECTION, SOLUTION INTRAMUSCULAR; INTRAVENOUS; SUBCUTANEOUS at 07:15

## 2024-06-26 RX ADMIN — NYSTATIN 1 APPLICATION: 100000 POWDER TOPICAL at 09:26

## 2024-06-26 RX ADMIN — HEPARIN SODIUM 5000 UNITS: 5000 INJECTION, SOLUTION INTRAVENOUS; SUBCUTANEOUS at 05:10

## 2024-06-26 RX ADMIN — OXYCODONE HYDROCHLORIDE 5 MG: 5 TABLET ORAL at 11:13

## 2024-06-26 RX ADMIN — SEVELAMER HYDROCHLORIDE 800 MG: 800 TABLET, FILM COATED PARENTERAL at 07:15

## 2024-06-26 RX ADMIN — HEPARIN SODIUM 5000 UNITS: 5000 INJECTION, SOLUTION INTRAVENOUS; SUBCUTANEOUS at 14:13

## 2024-06-26 RX ADMIN — OXYCODONE HYDROCHLORIDE 5 MG: 5 TABLET ORAL at 18:22

## 2024-06-26 RX ADMIN — ATORVASTATIN CALCIUM 80 MG: 80 TABLET, FILM COATED ORAL at 09:24

## 2024-06-26 RX ADMIN — Medication 5000 UNITS: at 09:23

## 2024-06-26 RX ADMIN — CYANOCOBALAMIN TAB 500 MCG 1000 MCG: 500 TAB at 09:24

## 2024-06-26 RX ADMIN — IRON SUCROSE 200 MG: 20 INJECTION, SOLUTION INTRAVENOUS at 10:00

## 2024-06-26 RX ADMIN — LIDOCAINE 5% 1 PATCH: 700 PATCH TOPICAL at 09:24

## 2024-06-26 RX ADMIN — TORSEMIDE 40 MG: 20 TABLET ORAL at 09:24

## 2024-06-26 RX ADMIN — HYDROMORPHONE HYDROCHLORIDE 0.5 MG: 1 INJECTION, SOLUTION INTRAMUSCULAR; INTRAVENOUS; SUBCUTANEOUS at 11:48

## 2024-06-26 NOTE — PROGRESS NOTES
NEPHROLOGY HOSPITAL PROGRESS NOTE   Joaquin Frankel 44 y.o. male MRN: [id number]  Unit/Bed#: [address] [place] [address] Encounter: [id number]  Reason for Consult: CLOVIS    ASSESSMENT and PLAN:    44-year-old male with a past medical history of CKD stage III, diabetes, hypertension who initially presents with shortness of breath and chest pain.  Nephrology on board for acute kidney injury.     1-acute kidney injury-present on admission     - Prior baseline creatinine 1.3 to 1.6 mg/dL with episodes of acute kidney injury  - Admission creatinine 6 mg/dL  - Peak creatinine 10.4 mg/dL  - Initially started on intermittent dialysis on June 7.  Subsequently on June 9 the patient was transition to CRRT temporarily due to hypotension.  And then transition back to intermittent hemodialysis on Tori 10 due to hemodynamic stability.  - June 25-dialysis.  Sodium borderline low for volume overload and will monitor her.  Phosphorus elevated 9.5.  Start phosphorus binders.  Potassium elevated and will dialyze today.     Serological workup  - ANCA-negative  - Anti-GBM negative  - JOSE ROBERTO negative  - Anti-double-stranded DNA negative  - Kappa/lambda ratio normal limits  - IgG kappa monoclonal band on SPEP and UPEP  - C3 and C4 were normal  - Beta-2 microglobulin was elevated     Renal imaging-no evidence of hydronephrosis     Dialysis access-tunneled dialysis catheter placed June 14     Etiology-bone marrow biopsy results are pending.  There is concern for possible multiple myeloma.  If confirmed, then we will hold on renal biopsy.  If bone marrow biopsy is unrevealing, patient may ultimately require renal biopsy but currently is not safe for renal biopsy due to significant volume overload and may need to complete as outpatient     Plan  - Dialysis today to convert patient to F schedule  - Low potassium diet  - Noted patient will be on dialysis as outpatient at [facility] schedule  - Monitor for renal recovery  - Pending bone marrow  biopsy  - Dressing change on catheter site, noted to have very mild/minimal erythema at the entrance site that is improved today June 26 compared to yesterday June 25.  Yesterday was very minimal also..  No pain.  No drainage.  Will monitor closely for now.  If erythema worsens, will need antibiotics.  Can be monitored further as outpatient  - Reviewed case primary team attending and we are in agreement renal plan for dialysis today  - Defer final decision regarding transfusion to primary team     2-electrolytes-hyponatremia-likely in the setting of volume overload.  Monitor with ultrafiltration.  Improving June 26    Borderline hyperkalemia.  Monitor with dialysis again today     3-acid/base-appropriate to monitor with dialysis     5-fpqscq-ckvnxd-up bone marrow biopsy.  SPEP and UPEP positive for IgG kappa monoclonal bands.  On IV iron     - Defer JOSSELYN to hematology team     5-hypertension-monitor with ultrafiltration and current regimen.  Further management in progress by cardiology team.  Imdur started 6/24.  Blood pressures improving.  Cardiology team adjusting medications     6-MBD-secondary hyperparathyroidism with hyperphosphatemia.  Monitor with current regimen     7-chest pain with exertion-per primary team.  Stress test completed with fixed defect.  Current medical management being recommended per cardiology team.       SUBJECTIVE / 24H INTERVAL HISTORY:    Blood pressure is 1/21/1930 systolic.  On room air.  Patient states that he did not have chest pain this morning.    OBJECTIVE:  Current Weight: Weight - Scale: (!) 182 kg (400 lb 9.6 oz)  Vitals:    06/25/24 1949 06/25/24 2252 06/26/24 0534 06/26/24 0805   BP: 139/65 135/67  125/58   BP Location:       Pulse: 62 69  66   Resp:  18     Temp:  98.3 °F (36.8 °C)  98.3 °F (36.8 °C)   TempSrc:       SpO2: 91% 96%  92%   Weight:   (!) 182 kg (400 lb 9.6 oz)    Height:           Intake/Output Summary (Last 24 hours) at 6/26/2024 1030  Last data filed at  6/25/2024 1200  Gross per 24 hour   Intake 300 ml   Output 4150 ml   Net -3850 ml     General: NAD  Skin: no rash  Eyes: anicteric sclera  ENT: moist mucous membrane  Neck: supple  Chest: CTA b/l, no ronchii, no wheeze, no rubs, no rales  CVS: s1s2, no murmur, no gallop, no rub  Abdomen: soft, nontender, nl sounds  Extremities: Unchanged significant 3-4+ lower extremity edema bilaterally, tunneled dialysis catheter site with improved erythema.  Very minimal erythema compared to yesterday very minimal erythema at the entrance site.  : no lewis  Neuro: AAOX3  Psych: normal affect    Medications:    Current Facility-Administered Medications:     atorvastatin (LIPITOR) tablet 80 mg, 80 mg, Oral, Daily, PETRA Briceño, 80 mg at 06/26/24 0924    Cholecalciferol (VITAMIN D3) tablet 5,000 Units, 5,000 Units, Oral, Daily, PETRA Briceño, 5,000 Units at 06/26/24 0923    cyanocobalamin (VITAMIN B-12) tablet 1,000 mcg, 1,000 mcg, Oral, Daily, Holly Sifuentes, PETRA, 1,000 mcg at 06/26/24 0924    docusate sodium (COLACE) capsule 100 mg, 100 mg, Oral, BID, Holly Sifuentes, CRNP, 100 mg at 06/24/24 0805    heparin (porcine) subcutaneous injection 5,000 Units, 5,000 Units, Subcutaneous, Q8H SHAZIA, PETAR Briceño, 5,000 Units at 06/26/24 0510    HYDROmorphone (DILAUDID) injection 0.5 mg, 0.5 mg, Intravenous, Q3H PRN, Alyssa Levy MD, 0.5 mg at 06/26/24 0715    insulin lispro (HumALOG/ADMELOG) 100 units/mL subcutaneous injection 1-6 Units, 1-6 Units, Subcutaneous, HS, PETRA Briceño, 1 Units at 06/06/24 2150    insulin lispro (HumALOG/ADMELOG) 100 units/mL subcutaneous injection 2-12 Units, 2-12 Units, Subcutaneous, TID AC, 2 Units at 06/25/24 1251 **AND** Fingerstick Glucose (POCT), , , TID AC, PETRA Briceño    iron sucrose (VENOFER) 200 mg in sodium chloride 0.9 % 100 mL IVPB, 200 mg, Intravenous, Once per day on Monday  Wednesday Friday, Alyssa Levy MD, Last Rate: 100 mL/hr at 06/23/24 1323, 200 mg at 06/23/24 1323    isosorbide mononitrate (IMDUR) 24 hr tablet 60 mg, 60 mg, Oral, Daily, Monique Alfred PA-C, 60 mg at 06/26/24 0924    lidocaine (LIDODERM) 5 % patch 1 patch, 1 patch, Topical, Daily, PETRA Briceño, 1 patch at 06/26/24 0924    metoprolol succinate (TOPROL-XL) 24 hr tablet 25 mg, 25 mg, Oral, Daily, Jose De Jesus Cardozo PA-C, 25 mg at 06/26/24 0924    nitroglycerin (NITROSTAT) SL tablet 0.4 mg, 0.4 mg, Sublingual, Q5 Min PRN, Alyssa Levy MD    nystatin (MYCOSTATIN) powder 1 Application, 1 Application, Topical, BID, PETRA Briceño, 1 Application at 06/26/24 0926    ondansetron (ZOFRAN) injection 4 mg, 4 mg, Intravenous, Q6H PRN, PETRA Briceño, 4 mg at 06/22/24 1015    ondansetron (ZOFRAN) injection 4 mg, 4 mg, Intravenous, Daily, Alyssa Levy MD, 4 mg at 06/25/24 0543    oxyCODONE (ROXICODONE) IR tablet 5 mg, 5 mg, Oral, Q6H PRN, Dennis Waterman MD, 5 mg at 06/26/24 0516    polyethylene glycol (MIRALAX) packet 17 g, 17 g, Oral, Daily PRN, PETRA Briceño    senna (SENOKOT) tablet 8.6 mg, 1 tablet, Oral, HS, PETRA Briceño, 8.6 mg at 06/23/24 2253    sevelamer (RENAGEL) tablet 800 mg, 800 mg, Oral, TID With Meals, Kriss Velazco MD, 800 mg at 06/26/24 0715    torsemide (DEMADEX) tablet 40 mg, 40 mg, Oral, Daily, Kylie Kendall MD, 40 mg at 06/26/24 0924    Laboratory Results:  Results from last 7 days   Lab Units 06/26/24  0313 06/25/24  0547 06/24/24  0527 06/23/24  0619 06/22/24  0505 06/21/24  0531 06/20/24  0538   WBC Thousand/uL 7.39 7.58 6.68 6.79 7.82 8.30 9.65   HEMOGLOBIN g/dL 7.0* 7.2* 7.1* 7.0* 6.8* 6.6* 7.1*   HEMATOCRIT % 22.3* 22.8* 21.9* 22.0* 21.8* 21.0* 22.7*   PLATELETS Thousands/uL 261 307 301 344 310 340 377   POTASSIUM mmol/L 5.4* 5.4* 5.2 4.6 5.0 4.8 5.0   CHLORIDE mmol/L 89* 92* 94* 92* 91* 92* 91*   CO2  mmol/L 26 25 25 26 26 26 24   BUN mg/dL 46* 57* 47* 34* 54* 44* 57*   CREATININE mg/dL 10.53* 12.46* 10.79* 8.97* 11.31* 9.46* 11.36*   CALCIUM mg/dL 8.7 8.9 8.4 8.4 8.7 8.2* 8.5   MAGNESIUM mg/dL  --  1.9  --   --   --   --   --    PHOSPHORUS mg/dL 7.9* 9.5*  --   --   --   --   --

## 2024-06-26 NOTE — PLAN OF CARE
TX plan reviewed with Dr JOHN Velazco and he is agreeable to the following: Goal is to UF 1.5-2.5 L on a 2 K+ bath for a morning serum K+ of 5.4, 2 HR TX today d/t late start with machine complications and pt is to D/C today.     Problem: METABOLIC, FLUID AND ELECTROLYTES - ADULT  Goal: Electrolytes maintained within normal limits  Description: INTERVENTIONS:  - Monitor labs and assess patient for signs and symptoms of electrolyte imbalances  - Administer electrolyte replacement as ordered  - Monitor response to electrolyte replacements, including repeat lab results as appropriate  - Instruct patient on fluid and nutrition as appropriate  Outcome: Progressing  Goal: Fluid balance maintained  Description: INTERVENTIONS:  - Monitor labs   - Monitor I/O and WT  - Instruct patient on fluid and nutrition as appropriate  - Assess for signs & symptoms of volume excess or deficit  Outcome: Progressing     Post-Dialysis RN Treatment Note    Blood Pressure:  Pre 114/69 mm/Hg  Post 123/80 mmHg   EDW  TBD kg    Weight:  Pre 181.7 kg   Post 180.2 kg   Mode of weight measurement: Standing Scale   Volume Removed  1500 ml net    Treatment duration 120 minutes    NS given  No    Treatment shortened? Yes, describe: by 90 mins as per Dr VIMAL Velazco, late start today d/t machine complications and pt is to D/c after TX.    Medications given during Rx None Reported   Estimated Kt/V  0.44   Access type: Permacath/TDC   Access Issues: No    Report called to primary nurse   Yes,  Jesus Mccarty

## 2024-06-26 NOTE — PLAN OF CARE
Problem: Prexisting or High Potential for Compromised Skin Integrity  Goal: Skin integrity is maintained or improved  Description: INTERVENTIONS:  - Identify patients at risk for skin breakdown  - Assess and monitor skin integrity  - Assess and monitor nutrition and hydration status  - Monitor labs   - Assess for incontinence   - Turn and reposition patient  - Assist with mobility/ambulation  - Relieve pressure over bony prominences  - Avoid friction and shearing  - Provide appropriate hygiene as needed including keeping skin clean and dry  - Evaluate need for skin moisturizer/barrier cream  - Collaborate with interdisciplinary team   - Patient/family teaching  - Consider wound care consult   Outcome: Progressing     Problem: PAIN - ADULT  Goal: Verbalizes/displays adequate comfort level or baseline comfort level  Description: Interventions:  - Encourage patient to monitor pain and request assistance  - Assess pain using appropriate pain scale  - Administer analgesics based on type and severity of pain and evaluate response  - Implement non-pharmacological measures as appropriate and evaluate response  - Consider cultural and social influences on pain and pain management  - Notify physician/advanced practitioner if interventions unsuccessful or patient reports new pain  Outcome: Progressing     Problem: INFECTION - ADULT  Goal: Absence or prevention of progression during hospitalization  Description: INTERVENTIONS:  - Assess and monitor for signs and symptoms of infection  - Monitor lab/diagnostic results  - Monitor all insertion sites, i.e. indwelling lines, tubes, and drains  - [place] appropriate cooling/warming therapies per order  - Administer medications as ordered  - Instruct and encourage patient and family to use good hand hygiene technique  - Identify and instruct in appropriate isolation precautions for identified infection/condition  Outcome: Progressing     Problem: SAFETY ADULT  Goal: Patient will  remain free of falls  Description: INTERVENTIONS:  - Educate patient/family on patient safety including physical limitations  - Instruct patient to call for assistance with activity   - Consult OT/PT to assist with strengthening/mobility   - Keep Call bell within reach  - Keep bed low and locked with side rails adjusted as appropriate  - Keep care items and personal belongings within reach  - Initiate and maintain comfort rounds  - Make Fall Risk Sign visible to staff  - Apply yellow socks and bracelet for high fall risk patients  - Consider moving patient to room near nurses station  Outcome: Progressing  Goal: Maintain or return to baseline ADL function  Description: INTERVENTIONS:  -  Assess patient's ability to carry out ADLs; assess patient's baseline for ADL function and identify physical deficits which impact ability to perform ADLs (bathing, care of mouth/teeth, toileting, grooming, dressing, etc.)  - Assess/evaluate cause of self-care deficits   - Assess range of motion  - Assess patient's mobility; develop plan if impaired  - Assess patient's need for assistive devices and provide as appropriate  - Encourage maximum independence but intervene and supervise when necessary  - Involve family in performance of ADLs  - Assess for home care needs following discharge   - Consider OT consult to assist with ADL evaluation and planning for discharge  - Provide patient education as appropriate  Outcome: Progressing  Goal: Maintains/Returns to pre admission functional level  Description: INTERVENTIONS:  - Perform AM-PAC 6 Click Basic Mobility/ Daily Activity assessment daily.  - Set and communicate daily mobility goal to care team and patient/family/caregiver.   - Collaborate with rehabilitation services on mobility goals if consulted  - Out of bed for toileting  - Record patient progress and toleration of activity level   Outcome: Progressing     Problem: CARDIOVASCULAR - ADULT  Goal: Maintains optimal cardiac output  and hemodynamic stability  Description: INTERVENTIONS:  - Monitor I/O, vital signs and rhythm  - Monitor for S/S and trends of decreased cardiac output  - Administer and titrate ordered vasoactive medications to optimize hemodynamic stability  - Assess quality of pulses, skin color and temperature  - Assess for signs of decreased coronary artery perfusion  - Instruct patient to report change in severity of symptoms  Outcome: Progressing  Goal: Absence of cardiac dysrhythmias or at baseline rhythm  Description: INTERVENTIONS:  - Continuous cardiac monitoring, vital signs, obtain 12 lead EKG if ordered  - Administer antiarrhythmic and heart rate control medications as ordered  - Monitor electrolytes and administer replacement therapy as ordered  Outcome: Progressing     Problem: RESPIRATORY - ADULT  Goal: Achieves optimal ventilation and oxygenation  Description: INTERVENTIONS:  - Assess for changes in respiratory status  - Assess for changes in mentation and behavior  - Position to facilitate oxygenation and minimize respiratory effort  - Oxygen administered by appropriate delivery if ordered  - Initiate smoking cessation education as indicated  - Encourage broncho-pulmonary hygiene including cough, deep breathe, Incentive Spirometry  - Assess the need for suctioning and aspirate as needed  - Assess and instruct to report SOB or any respiratory difficulty  - Respiratory Therapy support as indicated  Outcome: Progressing     Problem: SKIN/TISSUE INTEGRITY - ADULT  Goal: Incision(s), wounds(s) or drain site(s) healing without S/S of infection  Description: INTERVENTIONS  - Assess and document dressing, incision, wound bed, drain sites and surrounding tissue  - Provide patient and family education  - Perform skin care/dressing changes  Outcome: Progressing  Goal: Skin Integrity remains intact(Skin Breakdown Prevention)  Description: Assess:  -Perform Juan assessment every shift  -Clean and moisturize skin every per  order/PRN   -Inspect skin when repositioning, toileting, and assisting with ADLS  -Assess extremities for adequate circulation and sensation     Bed Management:  -Have minimal linens on bed & keep smooth, unwrinkled  -Change linens as needed when moist or perspiring  -Avoid sitting or lying in one position for more than 2 hours while in bed  -Keep HOB at 60 degrees     Toileting:  -Offer bedside commode  -Assess for incontinence every 2 hours   -Use incontinent care products after each incontinent episode such as foaming cleanser     Activity:  -Mobilize patient 3 times a day  -Encourage activity and walks on unit  -Encourage or provide ROM exercises   -Turn and reposition patient every 2 Hours  -Use appropriate equipment to lift or move patient in bed  -Instruct/ Assist with weight shifting every hour when out of bed in chair  -Consider limitation of chair time 2 hour intervals    Skin Care:  -Avoid use of baby powder, tape, friction and shearing, hot water or constrictive clothing  -Do not massage red bony areas       Problem: NEUROSENSORY - ADULT  Goal: Achieves maximal functionality and self care  Description: INTERVENTIONS  - Monitor swallowing and airway patency with patient fatigue and changes in neurological status  - Encourage and assist patient to increase activity and self care.   - Encourage visually impaired, hearing impaired and aphasic patients to use assistive/communication devices  Outcome: Progressing     Problem: METABOLIC, FLUID AND ELECTROLYTES - ADULT  Goal: Electrolytes maintained within normal limits  Description: INTERVENTIONS:  - Monitor labs and assess patient for signs and symptoms of electrolyte imbalances  - Administer electrolyte replacement as ordered  - Monitor response to electrolyte replacements, including repeat lab results as appropriate  - Instruct patient on fluid and nutrition as appropriate  Outcome: Progressing  Goal: Fluid balance maintained  Description: INTERVENTIONS:  -  Monitor labs   - Monitor I/O and WT  - Instruct patient on fluid and nutrition as appropriate  - Assess for signs & symptoms of volume excess or deficit  Outcome: Progressing  Goal: Glucose maintained within target range  Description: INTERVENTIONS:  - Monitor Blood Glucose as ordered  - Assess for signs and symptoms of hyperglycemia and hypoglycemia  - Administer ordered medications to maintain glucose within target range  - Assess nutritional intake and initiate nutrition service referral as needed  Outcome: Progressing     Problem: MUSCULOSKELETAL - ADULT  Goal: Maintain or return mobility to safest level of function  Description: INTERVENTIONS:  - Assess patient's ability to carry out ADLs; assess patient's baseline for ADL function and identify physical deficits which impact ability to perform ADLs (bathing, care of mouth/teeth, toileting, grooming, dressing, etc.)  - Assess/evaluate cause of self-care deficits   - Assess range of motion  - Assess patient's mobility  - Assess patient's need for assistive devices and provide as appropriate  - Encourage maximum independence but intervene and supervise when necessary  - Involve family in performance of ADLs  - Assess for home care needs following discharge   - Consider OT consult to assist with ADL evaluation and planning for discharge  - Provide patient education as appropriate  Outcome: Progressing  Goal: Maintain proper alignment of affected body part  Description: INTERVENTIONS:  - Support, maintain and protect limb and body alignment  - Provide patient/ family with appropriate education  Outcome: Progressing

## 2024-06-26 NOTE — CASE MANAGEMENT
Case Management Discharge Planning Note    Patient name Joaquin Frankel  Location [address] [place] [address]/[address] [place] [address]-* MRN [id number]  : 1980 Date 2024       Current Admission Date: 6/3/2024  Current Admission Diagnosis:Acute renal failure (ARF) ([facility])   Patient Active Problem List    Diagnosis Date Noted Date Diagnosed    Wound of abdomen 2024     Volume overload 2024     Surgical follow-up care 2024     Urinary problem in male 2024     Noncompliance with diet and medication regimen 2024     Epidermoid cyst of skin of scalp 2024     Stopped smoking with greater than 20 pack year history 2024     CKD (chronic kidney disease) stage 4, GFR 15-29 ml/min ([facility]) 2024     ANGY (obstructive sleep apnea) 2024     Acute respiratory failure with hypoxia ([facility]) 10/07/2023     Long term (current) use of immunomodulator 2023     Chronic acquired lymphedema 06/15/2022     History of coronary angioplasty with insertion of stent 2022     Anemia of chronic disease 2022     History of hidradenitis suppurativa 2022     Nocturnal hypoxia 2021     Cellulitis of left lower extremity 2021     Morbid obesity ([facility]) 03/10/2021     Hyponatremia 2021     Acute renal failure (ARF) ([facility]) 2021     Diabetes ([facility]) 2018     Other hyperlipidemia 2017     Hypertension 2016     Coronary artery disease with angina pectoris ([facility]) 2014       LOS (days): 23  Geometric Mean LOS (GMLOS) (days): 4.4  Days to GMLOS:-18.7     OBJECTIVE:  Risk of Unplanned Readmission Score: 34.93       Current admission status: Inpatient   Preferred Pharmacy:   [id number] - [place], [place] - [address]  [address] [address]  Phone: [phone] Fax: [phone]    [facility] [place] - [address] [facility] [address]  [address] [place] [id number]  [facility] [address]  Phone:  [phone] Fax: [phone]    Primary Care Provider: Hany Mcfarland DO    Primary Insurance: BLUE CROSS  Secondary Insurance:     DISCHARGE DETAILS:       Treatment Team Recommendation: Home  Discharge Destination Plan:: Home  Transport at Discharge : Self, Automobile     Patient is medically cleared for discharge per discussion with attending during rounds.  Patient will discharge to home today and is scheduled for start at [facility] for OP HD on Friday, 6/28 at 1530.  SW notified clinic of discharge and confirmed planned start date in [facility].

## 2024-06-26 NOTE — PROGRESS NOTES
"Progress Note - Cardiology   [facility]     Joaquin Frankel 44 y.o. male MRN: [id number]  : 1980  Unit/Bed#: [place] [address] Encounter: [id number]    Assessment and Plan:   Chest pain.  - Cardiology consulted due to patient's reports of chest discomfort with activity, chest comfort resolves with rest.  - 24 nuclear stress test: Abnormal study. Small predominantly fixed apical defect. SSS 3 SRS 1 SDS 2. No major reversible perfusion defect noted on this study. Elevated BP noted during the they study.   - 24 TTE: LVEF 58%. Diastolic function is mildly abnormal, consistent with grade I (abnormal) relaxation.  Bilateral atrium mildly dilated.  Mitral with mild regurgitation.  Tricuspid valve with mild regurgitation.  Right ventricle systolic pressure moderately elevated, 58 mmHg.  Pulmonic valve with mild regurgitation.  - Continue Imdur 60 mg daily.   - Will plan for medical management at this time. 24 Cardiology attending note: \"His cardiac enzymes were negative. His stress test yesterday showed a predominantly fixed apical defect-no large reversible perfusion defects. We are trying to optimize his antianginals and try conservative therapy. He is currently high risk for invasive management-especially in the setting of no major large defects on stress test.\"    Coronary artery disease.  - s/p PCI with SABINE to mid RCA on 2020.   - s/p PCI to Lcx .   - 20 cardiac catheterization:     --  The coronary circulation is right dominant.  --  There was 1-vessel coronary artery disease.  --  Left main: Angiography showed minor luminal irregularities.  --  LAD: Angiography showed minor luminal irregularities.  --  Mid circumflex: There was a 30 % stenosis.  --  RCA: The vessel was excessively tortuous.  --  Proximal RCA: There was a 30 % stenosis.  --  Mid RCA: There was a 80 % stenosis.  --  Distal RCA: There was a 40 % stenosis.    - 24 nuclear stress test: " "Abnormal study. Small predominantly fixed apical defect. SSS 3 SRS 1 SDS 2. No major reversible perfusion defect noted on this study. Elevated BP noted during the they study.   - Continue Lipitor 80 mg daily.  - Continue Imdur 60 mg daily.  - Continue Toprol-XL 25 mg daily.    Acute renal failure.  -  Prior to admission patient with a history of CKD stage III.  Admission creatinine 6 mg/dL with peak to 10.4 mg/dL.   - Currently on HD on Tuesday/Thursday/Saturday.  - Nephrology following.    Anemia of chronic disease.  - Hemoglobin ranges from 6.6-8.0 during his current hospitalization.  - Care per primary team.     Essential hypertension.  - SBP over 24 hours 126-146.  - Currently on Imdur 60 mg daily, Toprol-XL 25 mg daily, and torsemide 40 mg daily.   - Continue to monitor BP.    Dyslipidemia.  - 4/29/24 lipid panel: Cholesterol 184, triglycerides 198, HDL 20, .  - Currently on Lipitor 80 mg daily.    Chronic lower extremity lymphedema.  - Left lower extremity noticeably more edematous compared to right lower extremity.  - Recommend lower extremity compression and elevation.    Type II diabetes.   - 6/09/24 HgbA1c: 5.7.   - Care per primary team.     Class III obesity.  - BMI 62.74 kg/m2.   Subjective / Objective:         Patient currently denies experiencing any episodes of chest discomfort.  He states that he plans to ambulate the unit to assess if he has     Vitals: Blood pressure 125/58, pulse 66, temperature 98.3 °F (36.8 °C), resp. rate 18, height 5' 7\" (1.702 m), weight (!) 182 kg (400 lb 9.6 oz), SpO2 92%.  Vitals:    06/25/24 0600 06/26/24 0534   Weight: (!) 183 kg (402 lb 9.6 oz) (!) 182 kg (400 lb 9.6 oz)     Body mass index is 62.74 kg/m².  BP Readings from Last 3 Encounters:   06/26/24 125/58   05/22/24 134/74   05/16/24 144/72     Orthostatic Blood Pressures      Flowsheet Row Most Recent Value   Blood Pressure 125/58 filed at 06/26/2024 0805   Patient Position - Orthostatic VS Lying filed at " 06/25/2024 0815          I/O         06/23 0701  06/24 0700 06/24 0701  06/25 0700 06/25 0701  06/26 0700    P.O.  600     I.V. (mL/kg)       Blood 321.7      Total Intake(mL/kg) 321.7 (1.8) 600 (3.3)     Urine (mL/kg/hr)  100 (0)     Other       Total Output  100     Net +321.7 +500                  Invasive Devices       Peripheral Intravenous Line  Duration             Peripheral IV 06/23/24 Distal;Right;Upper;Ventral (anterior) Arm 2 days              Hemodialysis Catheter  Duration             HD Permanent Double Catheter 11 days                      Intake/Output Summary (Last 24 hours) at 6/26/2024 1017  Last data filed at 6/25/2024 1200  Gross per 24 hour   Intake 450 ml   Output 4150 ml   Net -3700 ml         Physical Exam:   Physical Exam  Vitals reviewed.   Constitutional:       General: He is not in acute distress.     Appearance: He is obese.   Cardiovascular:      Rate and Rhythm: Normal rate and regular rhythm.      Pulses: Normal pulses.      Heart sounds: Murmur heard.   Pulmonary:      Effort: Pulmonary effort is normal. No respiratory distress.      Breath sounds: Normal breath sounds.   Abdominal:      General: Abdomen is flat. There is no distension.      Palpations: Abdomen is soft.      Tenderness: There is no abdominal tenderness.   Musculoskeletal:      Right lower leg: No edema.      Left lower leg: Edema present.   Skin:     General: Skin is warm and dry.   Neurological:      Mental Status: He is alert and oriented to person, place, and time.       Medications/ Allergies:     Current Facility-Administered Medications   Medication Dose Route Frequency Provider Last Rate    atorvastatin  80 mg Oral Daily Holly Bush Bear, TOYANP      Cholecalciferol  5,000 Units Oral Daily Holly Bush Bear, CRNP      cyanocobalamin  1,000 mcg Oral Daily Holly Bush Bear, PETRA      docusate sodium  100 mg Oral BID Holly Bush PETRA Sifuentes      heparin (porcine)  5,000 Units  Subcutaneous Q8H SHAZIA PETRA Briceño      HYDROmorphone  0.5 mg Intravenous Q3H PRN Alyssa Levy MD      insulin lispro  1-6 Units Subcutaneous HS Holly PETRA Shell      insulin lispro  2-12 Units Subcutaneous TID AC PETRA Briceño      iron sucrose  200 mg Intravenous Once per day on Monday Wednesday Friday Alyssa Levy  mg (06/23/24 1323)    isosorbide mononitrate  60 mg Oral Daily Monique Alfred PA-C      lidocaine  1 patch Topical Daily [place] PETRA Shell      metoprolol succinate  25 mg Oral Daily Jose De Jesus Cardozo PA-C      nitroglycerin  0.4 mg Sublingual Q5 Min PRN Alyssa Levy MD      nystatin  1 Application Topical BID Holly PETRA Shell      ondansetron  4 mg Intravenous Q6H PRN Holly PETRA Shell      ondansetron  4 mg Intravenous Daily Alyssa Levy MD      oxyCODONE  5 mg Oral Q6H PRN Dennis Waterman MD      polyethylene glycol  17 g Oral Daily PRN Holly PETRA Shell      senna  1 tablet Oral HS [facility] PETRA Sifuentes      sevelamer  800 mg Oral TID With Meals Kriss Velazco MD      torsemide  40 mg Oral Daily Kylie Kendall MD       HYDROmorphone, 0.5 mg, Q3H PRN  nitroglycerin, 0.4 mg, Q5 Min PRN  ondansetron, 4 mg, Q6H PRN  oxyCODONE, 5 mg, Q6H PRN  polyethylene glycol, 17 g, Daily PRN      Allergies   Allergen Reactions    Keflex [Cephalexin] Facial Swelling and Lip Swelling    Amoxicillin Hives     childhood       VTE Pharmacologic Prophylaxis:   Heparin    Labs:   Troponins:  Results from last 7 days   Lab Units 06/21/24 2037 06/21/24  1839   HSTNI D2 ng/L  --  2   HSTNI D4 ng/L 3  --          CBC with diff:  Results from last 7 days   Lab Units 06/26/24  0313 06/25/24  0547 06/24/24  0527 06/23/24  0619 06/22/24  0505 06/21/24  0531 06/20/24  0538   WBC Thousand/uL 7.39 7.58 6.68 6.79 7.82 8.30 9.65   HEMOGLOBIN g/dL 7.0* 7.2* 7.1* 7.0* 6.8* 6.6* 7.1*   HEMATOCRIT %  22.3* 22.8* 21.9* 22.0* 21.8* 21.0* 22.7*   MCV fL 90 91 89 91 91 90 91   PLATELETS Thousands/uL 261 307 301 344 310 340 377   RBC Million/uL 2.47* 2.52* 2.45* 2.41* 2.41* 2.33* 2.49*   MCH pg 28.3 28.6 29.0 29.0 28.2 28.3 28.5   MCHC g/dL 31.4 31.6 32.4 31.8 31.2* 31.4 31.3*   RDW % 13.9 13.8 13.9 14.2 14.1 14.1 14.3   MPV fL 8.2* 8.5* 8.3* 8.8* 8.3* 8.8* 8.7*   NRBC AUTO /100 WBCs 0  --  0 0 0 0 0       CMP:  Results from last 7 days   Lab Units 06/26/24  0313 06/25/24  0547 06/24/24  0527 06/23/24  0619 06/22/24  0505 06/21/24  0531 06/20/24  0538   SODIUM mmol/L 131* 128* 131* 128* 127* 128* 128*   POTASSIUM mmol/L 5.4* 5.4* 5.2 4.6 5.0 4.8 5.0   CHLORIDE mmol/L 89* 92* 94* 92* 91* 92* 91*   CO2 mmol/L 26 25 25 26 26 26 24   ANION GAP mmol/L 16* 11 12 10 10 10 13   BUN mg/dL 46* 57* 47* 34* 54* 44* 57*   CREATININE mg/dL 10.53* 12.46* 10.79* 8.97* 11.31* 9.46* 11.36*   CALCIUM mg/dL 8.7 8.9 8.4 8.4 8.7 8.2* 8.5   ALBUMIN g/dL 2.8*  --   --   --   --   --   --    EGFR ml/min/1.73sq m 5 4 5 6 4 6 4       Magnesium:  Results from last 7 days   Lab Units 06/25/24  0547   MAGNESIUM mg/dL 1.9     Imaging & Testing   I have personally reviewed pertinent reports.       VAS VENOUS DUPLEX - LOWER LIMB BILATERAL    Result Date: 6/17/2024  Narrative:  [facility] REPORT CLINICAL: Indications: Patient presents with worsening bilateral lower extremity edema x a few weeks. Operative History: 2024-06-14 Right Tunneled central line Coronary angioplasty with stent placement Risk Factors The patient has history of Obesity, HTN, Diabetes, Hyperlipidemia, CAD, CKD, and previous smoking (quit 1-5yrs ago).   CONCLUSION:  Impression:  RIGHT LOWER LIMB: No gross evidence of acute or chronic deep vein thrombosis. No gross evidence of superficial thrombophlebitis noted. Doppler evaluation shows a normal response to augmentation maneuvers. Popliteal, posterior tibial and anterior tibial arterial Doppler waveforms are triphasic   LEFT LOWER LIMB: No gross evidence of acute or chronic deep vein thrombosis. No gross evidence of superficial thrombophlebitis noted. Doppler evaluation shows a normal response to augmentation maneuvers. Popliteal, posterior tibial and anterior tibial arterial Doppler waveforms are triphasic/hyperemic.  Tech Note: There is an echogenic structure located in the left inguinal region measuring approximately 3.31cm suggestive of enlarged lymphatic channels.  Technical findings were faxed to chart. Technically difficult/limited study. Some segments may be poorly visualized on today's exam.  SIGNATURE: Electronically Signed by: ANDREA WINSTON on 2024-06-17 08:12:45 PM    IR tunneled central line placement    Result Date: 6/14/2024  Narrative: PROCEDURE: Nontunneled central venous catheter removal. Tunneled dialysis catheter placement. Procedural Personnel Attending physician(s): Radames Liu MD Pre-procedure diagnosis: Anuric CLOVIS on CKD on HD without evidence of renal recovery Post-procedure diagnosis: Same Clinical History: 44-year-old man with history of acute kidney injury on pre-existing chronic kidney disease, who had a temporary dialysis catheter placed by me on 6/6/2024. On 6/10/2024, a small tear/hole was noted in the catheter I had placed, and that catheter was removed. The critical care service placed a new temporary dialysis catheter on that date. He has had no evidence of renal recovery and requires transition to a tunneled dialysis catheter. PROCEDURE SUMMARY: - Removal of nontunneled central venous catheter - Right internal jugular vein access under real-time ultrasound guidance - Tunneled dialysis catheter placement PROCEDURE DETAILS: Pre-procedure Consent: Informed consent for the procedure including risks, benefits and alternatives was obtained and time-out was performed prior to the procedure. Preparation: The site was prepared and draped using maximal sterile barrier technique including  cutaneous antisepsis. Anesthesia/sedation Level of anesthesia/sedation: Moderate sedation (conscious sedation) Anesthesia/sedation administered by: Independent trained observer under attending supervision with continuous monitoring of the patient's level of consciousness and physiologic status Total intra-service sedation time: 1 hour Technique and Findings: Initial fluoroscopic image demonstrated a temporary dialysis catheter in place, with tip terminating in the mid superior vena cava. The access trajectory was not amenable to conversion to a tunneled catheter. The temporary dialysis catheter was removed and hemostasis obtained with gentle manual pressure. Ultrasound demonstrated a widely patent right internal jugular vein, appropriate for access. Local anesthetic was administered. A dermatotomy was made. Under real-time ultrasound guidance, a 21-gauge micropuncture needle was advanced into the right internal jugular vein from a lateral approach. A permanent image was saved. A microwire was advanced and the needle exchanged for the micropuncture transitional dilator. This access was secured with a flow switch. An appropriate length tunneled dialysis catheter was selected. Local anesthetic was administered along the planned tunnel tract. A dermatotomy was made on the chest. The catheter was tunneled from the chest incision to the right neck incision. Attention was returned to the vascular access. The microwire was exchanged for a 0.035 inch guidewire. The access site was serially dilated and a peel-away sheath was placed. The catheter was inserted via the sheath, which was then peeled away. Completion image demonstrated that the tip terminated in the right atrium. The catheter was checked and both lumens provided brisk flow for dialysis. The catheter was secured with nonabsorbable suture, both lumens flushed and capped per protocol, and a sterile dressing was applied. Contrast None Radiation Dose Fluoroscopy time:  1.1 min Reference air kerma: 20 mGy Additional Details Specimens removed: None Estimated blood loss (mL): Less than 10 Complications: No immediate complications.     Impression: Successful placement of 15.5 Polish by 32 cm right chest/right internal jugular vein tunneled hemodialysis catheter. The tip terminates in the right atrium. The catheter is ready for immediate use. Workstation performed: [id number]     EKG / Monitor: Personally reviewed.      Not currently on telemetry.    Cardiac testing:     NM myocardial perfusion spect (rx stress and/or rest)  Result date: 6/24/24  Interpretation Summary    Stress ECG: The stress ECG is equivocal for ischemia after pharmacologic vasodilation.    Perfusion: There is a left ventricular perfusion defect that is small to medium in size present in the apical location(s) that is predominantly fixed.    Stress Combined Conclusion: Left ventricular perfusion is probably abnormal.    Stress Function: Left ventricular function post-stress is normal. Stress ejection fraction is 58%.    Perfusion Defect Conclusion: The stress/rest perfusion ratio is 1.10. There is no evidence of transient ischemic dilation (TID).    Abnormal study. Small predominantly fixed apical defect. SSS 3 SRS 1 SDS 2. No major reversible perfusion defect noted on this study. Elevated BP noted during the they study.    Echo complete with contrast if indicated  Result date: 6/04/24    Left Ventricle Left ventricular cavity size is normal. Wall thickness is moderately increased. There is moderate concentric hypertrophy. The left ventricular ejection fraction is 58% by visual estimation. Systolic function is normal. Wall motion is normal. Diastolic function is mildly abnormal, consistent with grade I (abnormal) relaxation.  Left atrial filling pressure is normal.   Right Ventricle Right ventricular cavity size is normal. Systolic function is normal. Wall thickness is normal.   Left Atrium The atrium is mildly  dilated.   Right Atrium The atrium is mildly dilated.   Aortic Valve The aortic valve was not well visualized. The leaflets are mildly thickened. The leaflets are mildly calcified. The leaflets exhibit normal mobility. There is no evidence of regurgitation. The aortic valve has no significant stenosis.   Mitral Valve Mitral valve structure is normal.  There is mild regurgitation. There is no evidence of stenosis.   Tricuspid Valve Tricuspid valve structure is normal. There is mild regurgitation. There is no evidence of stenosis. The right ventricular systolic pressure is moderately elevated. The estimated right ventricular systolic pressure is 58.00 mmHg.   Pulmonic Valve The pulmonic valve was not well visualized. Pulmonic valve structure is normal. There is mild regurgitation. There is no evidence of stenosis.   Ascending Aorta The aortic root is normal in size.   IVC/SVC The right atrial pressure is estimated at 8.0 mmHg. The inferior vena cava is normal in size.   Pericardium There is no pericardial effusion. The pericardium is normal in appearance.         Monique Alfred PA-C

## 2024-06-26 NOTE — PROGRESS NOTES
"The patient discharged home.  Per MD the patient can discharged home after dialysis treatment.   wrote a note that the patient can go back to work on July 1st.  The patient stated that \"other doctors told me I can stay home for 2 weeks I still don't feel good\"  The charge nurse aware.   Doctor notified via secure chat.  Nursing supervisor notified as well,   The nurse to nurse report provided to the night nurse.     "

## 2024-06-26 NOTE — DISCHARGE SUMMARY
Discharge Summary - [facility]  Patient: Joaquin Frankel 44 y.o. male   MRN: [id number]  PCP: Hany Mcfarland DO  Unit/Bed#: [address] [place] [address] Encounter: [id number]            Discharging Physician / Practitioner: Joanne Anguiano MD  PCP: Hany Mcfarland DO  Admission Date:   Admission Orders (From admission, onward)       Ordered        06/03/24 0937  INPATIENT ADMISSION  Once                          Discharge Date: 06/26/24      Reason for Admission: Chest pain/discomfort      Discharge Diagnoses:     Principal Problem:    Acute renal failure (ARF) (HCC)    Active Problems:    Coronary artery disease with angina pectoris (HCC)    Hypertension    Diabetes (HCC)    Hyponatremia    Morbid obesity (HCC)    Anemia of chronic disease    Chronic acquired lymphedema    Acute respiratory failure with hypoxia (HCC)    Wound of abdomen      Consultations During Hospital Stay:  Nephrology  Cardiology      Hospital Course:     Joaquin Frankel is a 44 y.o. male patient who originally presented to the hospital on 6/3/2024 due to complaints of generalized pain including chest discomfort, with a known history of morbid obesity and chronic/progressive bilateral lower extremity lymphedema.  He was evaluated by both cardiology and nephrology, due to presenting acute renal failure on admission, ultimately resulting in placement of a catheter for hemodialysis, which will be continued post-discharge.  Workup for etiology of renal failure revealed evidence of IgG kappa monoclonal banding on SPEP/UPEP and an elevated beta-2 microglobulin, however, other rheumatologic markers were unremarkable.  He underwent a bone marrow biopsy for which results are pending, to rule out multiple myeloma.  He has been advised by nephrology to continue a low potassium diet and has received IV iron infusions during hospital course, with plan to transition to oral iron supplementation on discharge.  He has undergone routine dressing  "changes around catheter insertion site which should be periodically monitored in the outpatient setting for development of worsening redness and/or drainage, indicative of a future infection.  From a cardiac standpoint, he has a history of CAD status post prior stenting/PCI.  He underwent a stress test on 6/25, and per cardiology, will remain on optimized medical treatment with high intensity statin, beta-blockade with Toprol-XL, and newly initiated Imdur to assist with anginal discomfort.  He will be discharged home today with outpatient follow-up with his PCP, outpatient wound care, and nephrology for continued dialysis.      Condition at Discharge: fair       Discharge Day Visit / Exam:     Vitals:  Blood Pressure: 125/58 (06/26/24 0805)  Pulse: 66 (06/26/24 0805)  Temperature: 98.3 °F (36.8 °C) (06/26/24 0805)  Temp Source: Tympanic (06/25/24 1200)  Respirations: 18 (06/25/24 2252)  Height: 5' 7\" (170.2 cm) (06/09/24 1530)  Weight - Scale: (!) 182 kg (400 lb 9.6 oz) (06/26/24 0534)  SpO2: 95 % (06/26/24 0900)      Physical exam:  I had a face-to-face encounter with the patient on day of discharge.      Discussion with Patient and/or Family:  The patient has been advised to return to the ER immediately if any symptoms recur or worsen.       Discharge instructions/Information to Patient and/or Family:   See after visit summary for information provided to patient and/or family.        Provisions for Follow-Up Care:  See after visit summary for information related to follow-up care and any pertinent home health orders.        Disposition:   Home       Discharge Medications:  See after visit summary for reconciled discharge medications provided to patient and/or family.        Discharge Statement:  I spent 38 minutes discharging the patient. This time was spent on the day of discharge. I had direct contact with the patient on the day of discharge. Greater than 50% of the total time was spent examining patient, " answering all patient questions, arranging and discussing plan of care with patient as well as directly providing post-discharge instructions.  Additional time then spent on discharge activities.           JUANA HARRIS MD   Hospitalist - [facility]        ** Please Note: This note is constructed using a voice recognition dictation system.  An occasional wrong word/phrase or “sound-a-like” substitution may have been picked up by dictation device due to the inherent limitations of voice recognition software.  Read the chart carefully and recognize, using reasonable context, where substitutions may have occurred.**

## 2024-06-27 ENCOUNTER — PATIENT OUTREACH (OUTPATIENT)
Dept: FAMILY MEDICINE CLINIC | Facility: CLINIC | Age: 44
End: 2024-06-27

## 2024-06-27 ENCOUNTER — NURSE TRIAGE (OUTPATIENT)
Age: 44
End: 2024-06-27

## 2024-06-27 ENCOUNTER — TELEPHONE (OUTPATIENT)
Dept: OTHER | Facility: HOSPITAL | Age: 44
End: 2024-06-27

## 2024-06-27 ENCOUNTER — TRANSITIONAL CARE MANAGEMENT (OUTPATIENT)
Dept: FAMILY MEDICINE CLINIC | Facility: CLINIC | Age: 44
End: 2024-06-27

## 2024-06-27 DIAGNOSIS — Z71.89 COORDINATION OF COMPLEX CARE: Primary | ICD-10-CM

## 2024-06-27 LAB
ANISOCYTOSIS BLD QL SMEAR: PRESENT
BASO STIPL BLD QL SMEAR: PRESENT
BASOPHILS # BLD AUTO: 0.1 THOUSAND/UL (ref 0–0.1)
BASOPHILS NFR MAR MANUAL: 1 % (ref 0–1)
EOSINOPHIL # BLD AUTO: 0.51 THOUSAND/UL (ref 0–0.61)
EOSINOPHIL NFR BLD MANUAL: 5 % (ref 0–6)
LYMPHOCYTES # BLD AUTO: 0.51 THOUSAND/UL (ref 0.6–4.47)
LYMPHOCYTES # BLD AUTO: 5 %
MONOCYTES # BLD AUTO: 0.41 THOUSAND/UL (ref 0–1.22)
MONOCYTES NFR BLD AUTO: 4 % (ref 4–12)
NEUTS BAND NFR BLD MANUAL: 1 % (ref 0–8)
NEUTS SEG # BLD: 8.69 THOUSAND/UL (ref 1.81–6.82)
NEUTS SEG NFR BLD AUTO: 84 %
PATHOLOGY REVIEW: YES
PLATELET BLD QL SMEAR: ADEQUATE
RBC MORPH BLD: PRESENT
TOTAL CELLS COUNTED SPEC: 100

## 2024-06-27 PROCEDURE — 88365 INSITU HYBRIDIZATION (FISH): CPT | Performed by: STUDENT IN AN ORGANIZED HEALTH CARE EDUCATION/TRAINING PROGRAM

## 2024-06-27 PROCEDURE — 88364 INSITU HYBRIDIZATION (FISH): CPT | Performed by: STUDENT IN AN ORGANIZED HEALTH CARE EDUCATION/TRAINING PROGRAM

## 2024-06-27 PROCEDURE — 88305 TISSUE EXAM BY PATHOLOGIST: CPT | Performed by: STUDENT IN AN ORGANIZED HEALTH CARE EDUCATION/TRAINING PROGRAM

## 2024-06-27 PROCEDURE — 88313 SPECIAL STAINS GROUP 2: CPT | Performed by: STUDENT IN AN ORGANIZED HEALTH CARE EDUCATION/TRAINING PROGRAM

## 2024-06-27 PROCEDURE — 88311 DECALCIFY TISSUE: CPT | Performed by: STUDENT IN AN ORGANIZED HEALTH CARE EDUCATION/TRAINING PROGRAM

## 2024-06-27 PROCEDURE — 85097 BONE MARROW INTERPRETATION: CPT | Performed by: STUDENT IN AN ORGANIZED HEALTH CARE EDUCATION/TRAINING PROGRAM

## 2024-06-27 PROCEDURE — 85060 BLOOD SMEAR INTERPRETATION: CPT | Performed by: STUDENT IN AN ORGANIZED HEALTH CARE EDUCATION/TRAINING PROGRAM

## 2024-06-27 PROCEDURE — 88342 IMHCHEM/IMCYTCHM 1ST ANTB: CPT | Performed by: STUDENT IN AN ORGANIZED HEALTH CARE EDUCATION/TRAINING PROGRAM

## 2024-06-27 PROCEDURE — 88341 IMHCHEM/IMCYTCHM EA ADD ANTB: CPT | Performed by: STUDENT IN AN ORGANIZED HEALTH CARE EDUCATION/TRAINING PROGRAM

## 2024-06-27 NOTE — PROGRESS NOTES
Outpatient Care Management Note:    New HRR referral received. Patient was hospitalized from 6/3-6/26/24 with acute renal failure.  He presented with pain and chest discomfort.  He required a catheter for hemodialysis.  He will go to [facility] every Monday, Wednesday, and Friday at 3:30 pm.  He is to follow a low potassium diet. He needs follow up appts with his PCP, wound care and nephrology.  He was also given a referral to Physical/Occupational/Lymphedema therapy.      CM called Joaquin, introduced myself and the care management program.  I reviewed that I am covering for his regular CM, Yoli Hill.  I gave Joaquin Kent's contact information.     Joaquin states that he will be attending [facility] tomorrow in [place]. He lives in [place] but works in [place]. He has a HD catheter in place in  his chest area below his collar bone. He knows to monitor the catheter for any redness, swelling or drainage.  He will call nephrology with any questions.     Joaquin is scheduled to see his PCP on 7/8. He is not scheduled with wound care and states that his abdominal wounds are healed.  He will see nephrology at dialysis.     Joaquin is diabetic. He monitors his blood sugars. This morning it was 98.  He is not taking his lantus insulin, because it was causing him to go to low. He only takes glimepiride every other day.  He is scheduled to see endocrine for an initial visit on 9/30.  He is on a cancellation list for a sooner appt. His last BlX0Y=7.7 on 6/9/24.  CM instructed Joaquin to call his PCP with any concerns until he can be seen by endocrine.     Joaquin declined completing a med review. He states that he reviewed his list with the PCP office and cardiology.     CM did note that Joaquin had called cardiology today with ongoing chest pain symptoms. He states that he had these symptoms during is hospitalization also.  CM reinforced that he should call 911 and seek emergency care for any worsening chest pain or shortness of breath  symptoms. He knows to call cardiology with any questions.     Per AVS, Joaquin was referred to Physical, Occupational and Lymphedema treatments.  CM does not see an order. I will forward this to Dr Mcfarland.

## 2024-06-27 NOTE — PROGRESS NOTES
Ambulatory Referral to PT/OT Lymphedema Therapy (Order [id number])  Outpatient Referral  Date: 6/17/2024 Department: [address] Ordering/Authorizing: PETRA Orlando     I copied/pasted the order place on 6/17/24     There is an order already placed. [email or web address]

## 2024-06-27 NOTE — NURSING NOTE
Patient discharged to home.  Discharge instructions given by CHEYENNE Dowd. Pt denies any pain or sob.

## 2024-06-28 LAB
Lab: NORMAL
Lab: NORMAL
MISCELLANEOUS LAB TEST RESULT: NORMAL

## 2024-06-28 NOTE — UTILIZATION REVIEW
NOTIFICATION OF ADMISSION DISCHARGE   This is a Notification of Discharge from [facility]. Please be advised that this patient has been discharge from our facility. Below you will find the admission and discharge date and time including the patient’s disposition.   UTILIZATION REVIEW CONTACT:  Marjorie Ya  Utilization   Network Utilization Review Department  Phone: [phone] x carefully listen to the prompts. All voicemails are confidential.  Email: [email or web address]     ADMISSION INFORMATION  PRESENTATION DATE: 6/3/2024  7:01 AM  OBERVATION ADMISSION DATE: N/A  INPATIENT ADMISSION DATE: 6/3/24  9:37 AM   DISCHARGE DATE: 6/26/2024  8:30 PM   DISPOSITION:Home/Self Care    Network Utilization Review Department  ATTENTION: Please call with any questions or concerns to [phone] and carefully listen to the prompts so that you are directed to the right person. All voicemails are confidential.   For Discharge needs, contact Care Management DC Support Team at [phone] opt. [phone]  Send all requests for admission clinical reviews, approved or denied determinations and any other requests to dedicated fax number below belonging to the campus where the patient is receiving treatment. List of dedicated fax numbers for the Facilities:  FACILITY NAME UR FAX NUMBER   ADMISSION DENIALS (Administrative/Medical Necessity) [phone]   DISCHARGE SUPPORT TEAM ([facility]) [phone]   PARENT CHILD HEALTH (Maternity/NICU/Pediatrics) [phone]   [facility] [phone]   [facility] [phone]   [facility] [phone]   [facility] [phone]   [facility] [phone]   [facility] [phone]   [facility] [phone]   [facility] [phone]    [facility] [phone]   [facility] [phone]   [facility] [phone]   [facility] [phone]

## 2024-06-30 ENCOUNTER — TELEPHONE (OUTPATIENT)
Dept: FAMILY MEDICINE CLINIC | Facility: CLINIC | Age: 44
End: 2024-06-30

## 2024-07-01 NOTE — TELEPHONE ENCOUNTER
----- Message from Joanne Anguiano MD sent at 6/26/2024  3:02 PM EDT -----  Regarding: Status post discharge    Thank you for allowing us to participate in the care of your patient, Joaquin Frankel, who originally presented to the hospital on 6/3/2024 due to complaints of generalized pain including chest discomfort, with a known history of morbid obesity and chronic/progressive bilateral lower extremity lymphedema.  He was evaluated by both cardiology and nephrology, due to presenting acute renal failure on admission, ultimately resulting in placement of a catheter for hemodialysis, which will be continued post-discharge.  Workup for etiology of renal failure revealed evidence of IgG kappa monoclonal banding on SPEP/UPEP and an elevated beta-2 microglobulin, however, other rheumatologic markers were unremarkable.  He underwent a bone marrow biopsy for which results are pending, to rule out multiple myeloma.  He has been advised by nephrology to continue a low potassium diet and has received IV iron infusions during hospital course, with plan to transition to oral iron supplementation on discharge.  He has undergone routine dressing changes around catheter insertion site which should be periodically monitored in the outpatient setting for development of worsening redness and/or drainage, indicative of a future infection.  From a cardiac standpoint, he has a history of CAD status post prior stenting/PCI.  He underwent a stress test on 6/25, and per cardiology, will remain on optimized medical treatment with high intensity statin, beta-blockade with Toprol-XL, and newly initiated Imdur to assist with anginal discomfort.  He will be discharged home today with outpatient follow-up with yourself, outpatient wound care, and nephrology for continued dialysis.    If you have any additional questions or would like to discuss further, please feel free to contact me.    Joanne Anguiano MD  [facility] Internal Medicine,  [facility]  [phone]

## 2024-07-04 ENCOUNTER — APPOINTMENT (EMERGENCY)
Dept: RADIOLOGY | Facility: HOSPITAL | Age: 44
DRG: 175 | End: 2024-07-04
Payer: COMMERCIAL

## 2024-07-04 ENCOUNTER — HOSPITAL ENCOUNTER (INPATIENT)
Facility: HOSPITAL | Age: 44
LOS: 22 days | Discharge: HOME | DRG: 175 | End: 2024-07-26
Attending: EMERGENCY MEDICINE | Admitting: INTERNAL MEDICINE
Payer: COMMERCIAL

## 2024-07-04 ENCOUNTER — APPOINTMENT (EMERGENCY)
Dept: CT IMAGING | Facility: HOSPITAL | Age: 44
DRG: 175 | End: 2024-07-04
Payer: COMMERCIAL

## 2024-07-04 DIAGNOSIS — N18.6 ESRD ON DIALYSIS (HCC): ICD-10-CM

## 2024-07-04 DIAGNOSIS — I26.99 PULMONARY EMBOLISM (HCC): ICD-10-CM

## 2024-07-04 DIAGNOSIS — E87.79 OTHER HYPERVOLEMIA: ICD-10-CM

## 2024-07-04 DIAGNOSIS — R09.02 HYPOXIA: ICD-10-CM

## 2024-07-04 DIAGNOSIS — R07.9 CHEST PAIN, UNSPECIFIED TYPE: ICD-10-CM

## 2024-07-04 DIAGNOSIS — I20.89 ANGINAL CHEST PAIN AT REST: Primary | ICD-10-CM

## 2024-07-04 DIAGNOSIS — I10 PRIMARY HYPERTENSION: ICD-10-CM

## 2024-07-04 DIAGNOSIS — I50.33 ACUTE ON CHRONIC DIASTOLIC CONGESTIVE HEART FAILURE (HCC): ICD-10-CM

## 2024-07-04 DIAGNOSIS — Z99.2 ESRD ON DIALYSIS (HCC): ICD-10-CM

## 2024-07-04 LAB
2HR DELTA HS TROPONIN: 4 NG/L
4HR DELTA HS TROPONIN: 5 NG/L
ABO GROUP BLD: NORMAL
ALBUMIN SERPL BCG-MCNC: 3.1 G/DL (ref 3.5–5)
ALP SERPL-CCNC: 90 U/L (ref 34–104)
ALT SERPL W P-5'-P-CCNC: <3 U/L (ref 7–52)
ANION GAP SERPL CALCULATED.3IONS-SCNC: 12 MMOL/L (ref 4–13)
APTT PPP: 32 SECONDS (ref 23–37)
AST SERPL W P-5'-P-CCNC: 15 U/L (ref 13–39)
BASOPHILS # BLD AUTO: 0.04 THOUSANDS/ÂΜL (ref 0–0.1)
BASOPHILS NFR BLD AUTO: 1 % (ref 0–1)
BILIRUB SERPL-MCNC: 0.41 MG/DL (ref 0.2–1)
BLD GP AB SCN SERPL QL: NEGATIVE
BNP SERPL-MCNC: 990 PG/ML (ref 0–100)
BUN SERPL-MCNC: 27 MG/DL (ref 5–25)
CALCIUM ALBUM COR SERPL-MCNC: 9.7 MG/DL (ref 8.3–10.1)
CALCIUM SERPL-MCNC: 9 MG/DL (ref 8.4–10.2)
CARDIAC TROPONIN I PNL SERPL HS: 31 NG/L
CARDIAC TROPONIN I PNL SERPL HS: 35 NG/L
CARDIAC TROPONIN I PNL SERPL HS: 36 NG/L
CHLORIDE SERPL-SCNC: 96 MMOL/L (ref 96–108)
CO2 SERPL-SCNC: 27 MMOL/L (ref 21–32)
CREAT SERPL-MCNC: 9.02 MG/DL (ref 0.6–1.3)
D DIMER PPP FEU-MCNC: 4.21 UG/ML FEU
EOSINOPHIL # BLD AUTO: 0.14 THOUSAND/ÂΜL (ref 0–0.61)
EOSINOPHIL NFR BLD AUTO: 2 % (ref 0–6)
ERYTHROCYTE [DISTWIDTH] IN BLOOD BY AUTOMATED COUNT: 14.3 % (ref 11.6–15.1)
FLUAV RNA RESP QL NAA+PROBE: NEGATIVE
FLUBV RNA RESP QL NAA+PROBE: NEGATIVE
GFR SERPL CREATININE-BSD FRML MDRD: 6 ML/MIN/1.73SQ M
GLUCOSE SERPL-MCNC: 89 MG/DL (ref 65–140)
HCT VFR BLD AUTO: 21.2 % (ref 36.5–49.3)
HGB BLD-MCNC: 6.6 G/DL (ref 12–17)
IMM GRANULOCYTES # BLD AUTO: 0.02 THOUSAND/UL (ref 0–0.2)
IMM GRANULOCYTES NFR BLD AUTO: 0 % (ref 0–2)
INR PPP: 1.18 (ref 0.84–1.19)
LYMPHOCYTES # BLD AUTO: 0.68 THOUSANDS/ÂΜL (ref 0.6–4.47)
LYMPHOCYTES NFR BLD AUTO: 10 % (ref 14–44)
MAGNESIUM SERPL-MCNC: 1.9 MG/DL (ref 1.9–2.7)
MCH RBC QN AUTO: 29.1 PG (ref 26.8–34.3)
MCHC RBC AUTO-ENTMCNC: 31.1 G/DL (ref 31.4–37.4)
MCV RBC AUTO: 93 FL (ref 82–98)
MONOCYTES # BLD AUTO: 0.5 THOUSAND/ÂΜL (ref 0.17–1.22)
MONOCYTES NFR BLD AUTO: 7 % (ref 4–12)
NEUTROPHILS # BLD AUTO: 5.54 THOUSANDS/ÂΜL (ref 1.85–7.62)
NEUTS SEG NFR BLD AUTO: 80 % (ref 43–75)
NRBC BLD AUTO-RTO: 0 /100 WBCS
PLATELET # BLD AUTO: 254 THOUSANDS/UL (ref 149–390)
PMV BLD AUTO: 8.9 FL (ref 8.9–12.7)
POTASSIUM SERPL-SCNC: 4.5 MMOL/L (ref 3.5–5.3)
PROT SERPL-MCNC: 7.8 G/DL (ref 6.4–8.4)
PROTHROMBIN TIME: 15.7 SECONDS (ref 11.6–14.5)
RBC # BLD AUTO: 2.27 MILLION/UL (ref 3.88–5.62)
RH BLD: POSITIVE
RSV RNA RESP QL NAA+PROBE: NEGATIVE
SARS-COV-2 RNA RESP QL NAA+PROBE: NEGATIVE
SODIUM SERPL-SCNC: 135 MMOL/L (ref 135–147)
SPECIMEN EXPIRATION DATE: NORMAL
WBC # BLD AUTO: 6.92 THOUSAND/UL (ref 4.31–10.16)

## 2024-07-04 PROCEDURE — 3066F NEPHROPATHY DOC TX: CPT | Performed by: INTERNAL MEDICINE

## 2024-07-04 PROCEDURE — 86901 BLOOD TYPING SEROLOGIC RH(D): CPT

## 2024-07-04 PROCEDURE — 71045 X-RAY EXAM CHEST 1 VIEW: CPT

## 2024-07-04 PROCEDURE — 86900 BLOOD TYPING SEROLOGIC ABO: CPT

## 2024-07-04 PROCEDURE — 85025 COMPLETE CBC W/AUTO DIFF WBC: CPT

## 2024-07-04 PROCEDURE — 86920 COMPATIBILITY TEST SPIN: CPT

## 2024-07-04 PROCEDURE — 83735 ASSAY OF MAGNESIUM: CPT

## 2024-07-04 PROCEDURE — 85379 FIBRIN DEGRADATION QUANT: CPT

## 2024-07-04 PROCEDURE — 71275 CT ANGIOGRAPHY CHEST: CPT

## 2024-07-04 PROCEDURE — P9016 RBC LEUKOCYTES REDUCED: HCPCS

## 2024-07-04 PROCEDURE — 99223 1ST HOSP IP/OBS HIGH 75: CPT | Performed by: INTERNAL MEDICINE

## 2024-07-04 PROCEDURE — 36415 COLL VENOUS BLD VENIPUNCTURE: CPT

## 2024-07-04 PROCEDURE — 99291 CRITICAL CARE FIRST HOUR: CPT | Performed by: EMERGENCY MEDICINE

## 2024-07-04 PROCEDURE — 85730 THROMBOPLASTIN TIME PARTIAL: CPT

## 2024-07-04 PROCEDURE — 96365 THER/PROPH/DIAG IV INF INIT: CPT

## 2024-07-04 PROCEDURE — 80053 COMPREHEN METABOLIC PANEL: CPT

## 2024-07-04 PROCEDURE — 85610 PROTHROMBIN TIME: CPT

## 2024-07-04 PROCEDURE — 0241U HB NFCT DS VIR RESP RNA 4 TRGT: CPT

## 2024-07-04 PROCEDURE — 86850 RBC ANTIBODY SCREEN: CPT

## 2024-07-04 PROCEDURE — 84484 ASSAY OF TROPONIN QUANT: CPT

## 2024-07-04 PROCEDURE — 99285 EMERGENCY DEPT VISIT HI MDM: CPT

## 2024-07-04 PROCEDURE — 36430 TRANSFUSION BLD/BLD COMPNT: CPT

## 2024-07-04 PROCEDURE — 96375 TX/PRO/DX INJ NEW DRUG ADDON: CPT

## 2024-07-04 PROCEDURE — 83880 ASSAY OF NATRIURETIC PEPTIDE: CPT

## 2024-07-04 PROCEDURE — 93005 ELECTROCARDIOGRAM TRACING: CPT

## 2024-07-04 RX ORDER — HEPARIN SODIUM 10000 [USP'U]/100ML
3-30 INJECTION, SOLUTION INTRAVENOUS
Status: DISPENSED | OUTPATIENT
Start: 2024-07-04 | End: 2024-07-12

## 2024-07-04 RX ORDER — ONDANSETRON 2 MG/ML
4 INJECTION INTRAMUSCULAR; INTRAVENOUS ONCE
Status: COMPLETED | OUTPATIENT
Start: 2024-07-04 | End: 2024-07-04

## 2024-07-04 RX ORDER — HEPARIN SODIUM 1000 [USP'U]/ML
10000 INJECTION, SOLUTION INTRAVENOUS; SUBCUTANEOUS ONCE
Status: COMPLETED | OUTPATIENT
Start: 2024-07-04 | End: 2024-07-04

## 2024-07-04 RX ORDER — HEPARIN SODIUM 1000 [USP'U]/ML
10000 INJECTION, SOLUTION INTRAVENOUS; SUBCUTANEOUS EVERY 6 HOURS PRN
Status: ACTIVE | OUTPATIENT
Start: 2024-07-04 | End: 2024-07-15

## 2024-07-04 RX ORDER — ACETAMINOPHEN 325 MG/1
975 TABLET ORAL ONCE
Status: COMPLETED | OUTPATIENT
Start: 2024-07-04 | End: 2024-07-04

## 2024-07-04 RX ORDER — NITROGLYCERIN 0.4 MG/1
0.4 TABLET SUBLINGUAL ONCE
Status: COMPLETED | OUTPATIENT
Start: 2024-07-04 | End: 2024-07-04

## 2024-07-04 RX ORDER — HEPARIN SODIUM 1000 [USP'U]/ML
5000 INJECTION, SOLUTION INTRAVENOUS; SUBCUTANEOUS EVERY 6 HOURS PRN
Status: DISPENSED | OUTPATIENT
Start: 2024-07-04 | End: 2024-07-15

## 2024-07-04 RX ADMIN — HEPARIN SODIUM 10000 UNITS: 1000 INJECTION INTRAVENOUS; SUBCUTANEOUS at 20:35

## 2024-07-04 RX ADMIN — IOHEXOL 85 ML: 350 INJECTION, SOLUTION INTRAVENOUS at 18:06

## 2024-07-04 RX ADMIN — ACETAMINOPHEN 975 MG: 325 TABLET, FILM COATED ORAL at 17:27

## 2024-07-04 RX ADMIN — NITROGLYCERIN 0.4 MG: 0.4 TABLET SUBLINGUAL at 16:16

## 2024-07-04 RX ADMIN — NITROGLYCERIN 0.4 MG: 0.4 TABLET SUBLINGUAL at 15:02

## 2024-07-04 RX ADMIN — ONDANSETRON 4 MG: 2 INJECTION INTRAMUSCULAR; INTRAVENOUS at 15:49

## 2024-07-04 RX ADMIN — HEPARIN SODIUM 18 UNITS/KG/HR: 10000 INJECTION, SOLUTION INTRAVENOUS at 20:45

## 2024-07-04 NOTE — LETTER
[facility]  FLOOR MED SURG UNIT  [address]  [place] [address]  Dept: [phone]    July 26, 2024     Patient: Joaquin Frankel   YOB: 1980   Date of Visit: 7/4/2024       To Whom it May Concern:    Joaquin Frankel is under my professional care. He was seen in the hospital from 7/4/2024 to 07/26/24. He may return to work on Monday 8/5/2024 without limitations.    If you have any questions or concerns, please don't hesitate to call.      Sincerely,         Deya Palomino MD

## 2024-07-04 NOTE — ED ATTENDING ATTESTATION
7/4/2024  I, Lauryn Hobbs MD, saw and evaluated the patient. I have discussed the patient with the resident/non-physician practitioner and agree with the resident's/non-physician practitioner's findings, Plan of Care, and MDM as documented in the resident's/non-physician practitioner's note, except where noted. All available labs and Radiology studies were reviewed.  I was present for key portions of any procedure(s) performed by the resident/non-physician practitioner and I was immediately available to provide assistance.       At this point I agree with the current assessment done in the Emergency Department.  I have conducted an independent evaluation of this patient a history and physical is as follows:    Patient is a 44-year-old male presents to the emergency department with chest discomfort.  Left-sided pressure with radiation to the jaw and associated with dyspnea.  Discomfort was particularly intense last night.  It persisted today but slightly milder degree.  He notes some improvement following sublingual nitroglycerin here.  He appreciates dyspnea with even very mild activity such as walking 10 feet across the room.  He notes that a couple of months ago he would be able to walk 200 yards from the parking lot into his work building.  He gradually needed to take more and more breaks along the way to catch his breath.  He was hospitalized from Tori 3 through the 26th and underwent testing including echocardiogram and stress test.  He notes that his enzymes were not elevated.  Medications were adjusted and he reports compliance with these.  He was identified to have renal failure and initiated on dialysis a couple of weeks ago.  He does have chronic left leg swelling secondary to lymphedema.  This is not worse than usual.  He had some swelling in the right leg which has greatly improved with dialysis.  He reports compliance with the sessions.  Most recent was yesterday.    Blood pressure  elevated on arrival here.  Patient notes that blood pressure tends to be very high when his pain is maximal.  (Uncertain whether discomfort elevates pressure or elevated pressure may be causing discomfort).    Past medical history significant for hypertension, diabetes, CAD (status post stenting), ES RD (on hemodialysis with access with right chest catheter).    On exam he appears tired.  Mucous membranes are moist.  Heart sounds regular.  Lungs are clear to auscultation without wheezing, rhonchi or rales.  O2 sats 95% on 3 L supplemental O2.  He describes it feeling difficult to take an adequately deep breath.  Chest wall and abdomen are nontender.  Bandage clean, dry and intact around right chest wall catheter.  No leg tenderness.      Wt Readings from Last 3 Encounters:   06/26/24 (!) 182 kg (400 lb 9.2 oz)   05/30/24 (!) 187 kg (413 lb)   05/22/24 (!) 189 kg (417 lb)       Differential diagnosis includes but is not limited to symptomatic hypertension, ACS including but not limited to STEMI, NSTEMI and unstable angina, CHF exacerbation.  Doubt PE, pneumonia.  Anxiety may be contributing though highly doubt this is fully responsible.    ED Course  ED Course as of 07/04/24 2212   Thu Jul 04, 2024   1657 Troponin 31 -not consistent with MI given persistent chest pain since last night.  Do suspect that the anemia is greatly contributing to his symptoms especially with exertion.   1951 Radiologist reviewing images     He did have relief with first nitroglycerin given and improvement in blood pressure.  Second will be given.    Patient did have further improvement with second nitroglycerin.  We reviewed findings of anemia and how this might be contributing significantly to his dyspnea on exertion as well as chest discomfort.  Will transfuse.  CT scan was pending at that time and consideration of possible PE.    Ultimately patient found to have small PE.  Anticoagulation initiated.  Accepted to [facility] service    Critical  Care Time  CriticalCare Time    Date/Time: 7/4/2024 10:11 PM    Performed by: Lauryn Hobbs MD  Authorized by: Lauryn Hobbs MD    Critical care provider statement:     Critical care time (minutes):  30    Critical care time was exclusive of:  Separately billable procedures and treating other patients and teaching time    Critical care was necessary to treat or prevent imminent or life-threatening deterioration of the following conditions:  Circulatory failure (Hypoxia, symptomatic anemia)    Critical care was time spent personally by me on the following activities:  Obtaining history from patient or surrogate, examination of patient, review of old charts, ordering and performing treatments and interventions, ordering and review of laboratory studies, ordering and review of radiographic studies, re-evaluation of patient's condition, evaluation of patient's response to treatment and development of treatment plan with patient or surrogate

## 2024-07-05 ENCOUNTER — APPOINTMENT (INPATIENT)
Dept: DIALYSIS | Facility: HOSPITAL | Age: 44
DRG: 175 | End: 2024-07-05
Payer: COMMERCIAL

## 2024-07-05 ENCOUNTER — APPOINTMENT (INPATIENT)
Dept: NON INVASIVE DIAGNOSTICS | Facility: HOSPITAL | Age: 44
DRG: 175 | End: 2024-07-05
Payer: COMMERCIAL

## 2024-07-05 ENCOUNTER — PATIENT OUTREACH (OUTPATIENT)
Dept: FAMILY MEDICINE CLINIC | Facility: CLINIC | Age: 44
End: 2024-07-05

## 2024-07-05 PROBLEM — E11.9 TYPE 2 DIABETES MELLITUS (HCC): Status: RESOLVED | Noted: 2024-07-05 | Resolved: 2024-07-05

## 2024-07-05 PROBLEM — E11.9 TYPE 2 DIABETES MELLITUS (HCC): Status: ACTIVE | Noted: 2024-07-05

## 2024-07-05 PROBLEM — N18.6 ESRD (END STAGE RENAL DISEASE) ON DIALYSIS (HCC): Status: ACTIVE | Noted: 2024-07-05

## 2024-07-05 PROBLEM — I26.99 PULMONARY EMBOLISM (HCC): Status: ACTIVE | Noted: 2024-07-05

## 2024-07-05 PROBLEM — D64.9 ANEMIA: Status: ACTIVE | Noted: 2024-07-05

## 2024-07-05 PROBLEM — Z99.2 ESRD (END STAGE RENAL DISEASE) ON DIALYSIS (HCC): Status: ACTIVE | Noted: 2024-07-05

## 2024-07-05 LAB
2HR DELTA HS TROPONIN: -5 NG/L
ABO GROUP BLD BPU: NORMAL
ABO GROUP BLD BPU: NORMAL
ANION GAP SERPL CALCULATED.3IONS-SCNC: 9 MMOL/L (ref 4–13)
AORTIC ROOT: 2.8 CM
AORTIC VALVE MEAN VELOCITY: 12.4 M/S
APICAL FOUR CHAMBER EJECTION FRACTION: 65 %
APTT PPP: 43 SECONDS (ref 23–37)
APTT PPP: 54 SECONDS (ref 23–37)
APTT PPP: 58 SECONDS (ref 23–37)
APTT PPP: 65 SECONDS (ref 23–37)
ATRIAL RATE: 64 BPM
AV MEAN GRADIENT: 7 MMHG
AV PEAK GRADIENT: 13 MMHG
BPU ID: NORMAL
BPU ID: NORMAL
BSA FOR ECHO PROCEDURE: 2.71 M2
BUN SERPL-MCNC: 32 MG/DL (ref 5–25)
CALCIUM SERPL-MCNC: 8.8 MG/DL (ref 8.4–10.2)
CARDIAC TROPONIN I PNL SERPL HS: 27 NG/L
CARDIAC TROPONIN I PNL SERPL HS: 32 NG/L
CHLORIDE SERPL-SCNC: 97 MMOL/L (ref 96–108)
CO2 SERPL-SCNC: 31 MMOL/L (ref 21–32)
CREAT SERPL-MCNC: 9.93 MG/DL (ref 0.6–1.3)
CROSSMATCH: NORMAL
CROSSMATCH: NORMAL
DOP CALC AO PEAK VEL: 1.83 M/S
DOP CALC AO VTI: 46.46 CM
DOP CALC MV VTI: 49.34 CM
E WAVE DECELERATION TIME: 178 MS
E/A RATIO: 1.14
ERYTHROCYTE [DISTWIDTH] IN BLOOD BY AUTOMATED COUNT: 14.7 % (ref 11.6–15.1)
FRACTIONAL SHORTENING: 32 (ref 28–44)
GFR SERPL CREATININE-BSD FRML MDRD: 5 ML/MIN/1.73SQ M
GLUCOSE SERPL-MCNC: 100 MG/DL (ref 65–140)
GLUCOSE SERPL-MCNC: 106 MG/DL (ref 65–140)
GLUCOSE SERPL-MCNC: 114 MG/DL (ref 65–140)
GLUCOSE SERPL-MCNC: 80 MG/DL (ref 65–140)
HCT VFR BLD AUTO: 24.4 % (ref 36.5–49.3)
HGB BLD-MCNC: 7.6 G/DL (ref 12–17)
INTERVENTRICULAR SEPTUM IN DIASTOLE (PARASTERNAL SHORT AXIS VIEW): 1.3 CM
INTERVENTRICULAR SEPTUM: 1.3 CM (ref 0.6–1.1)
LEFT ATRIUM SIZE: 4.9 CM
LEFT INTERNAL DIMENSION IN SYSTOLE: 3.9 CM (ref 2.1–4)
LEFT VENTRICULAR INTERNAL DIMENSION IN DIASTOLE: 5.7 CM (ref 3.5–6)
LEFT VENTRICULAR POSTERIOR WALL IN END DIASTOLE: 1.3 CM
LEFT VENTRICULAR STROKE VOLUME: 92 ML
LVSV (TEICH): 92 ML
MCH RBC QN AUTO: 28.8 PG (ref 26.8–34.3)
MCHC RBC AUTO-ENTMCNC: 31.1 G/DL (ref 31.4–37.4)
MCV RBC AUTO: 92 FL (ref 82–98)
MV E'TISSUE VEL-LAT: 14 CM/S
MV E'TISSUE VEL-SEP: 10 CM/S
MV MEAN GRADIENT: 2 MMHG
MV PEAK A VEL: 0.99 M/S
MV PEAK E VEL: 113 CM/S
MV PEAK GRADIENT: 8 MMHG
MV STENOSIS PRESSURE HALF TIME: 52 MS
MV VALVE AREA P 1/2 METHOD: 4.23
P AXIS: 66 DEGREES
PLATELET # BLD AUTO: 229 THOUSANDS/UL (ref 149–390)
PMV BLD AUTO: 9 FL (ref 8.9–12.7)
POTASSIUM SERPL-SCNC: 4.5 MMOL/L (ref 3.5–5.3)
PR INTERVAL: 164 MS
QRS AXIS: -6 DEGREES
QRSD INTERVAL: 86 MS
QT INTERVAL: 450 MS
QTC INTERVAL: 464 MS
RA PRESSURE ESTIMATED: 10 MMHG
RBC # BLD AUTO: 2.64 MILLION/UL (ref 3.88–5.62)
RIGHT VENTRICLE ID DIMENSION: 4.1 CM
RV PSP: 29 MMHG
SL CV PED ECHO LEFT VENTRICLE DIASTOLIC VOLUME (MOD BIPLANE) 2D: 159 ML
SL CV PED ECHO LEFT VENTRICLE SYSTOLIC VOLUME (MOD BIPLANE) 2D: 67 ML
SODIUM SERPL-SCNC: 137 MMOL/L (ref 135–147)
T WAVE AXIS: 76 DEGREES
TR MAX PG: 19 MMHG
TR PEAK VELOCITY: 2.2 M/S
TRICUSPID ANNULAR PLANE SYSTOLIC EXCURSION: 3.4 CM
TRICUSPID VALVE PEAK REGURGITATION VELOCITY: 2.17 M/S
UNIT DISPENSE STATUS: NORMAL
UNIT DISPENSE STATUS: NORMAL
UNIT PRODUCT CODE: NORMAL
UNIT PRODUCT CODE: NORMAL
UNIT PRODUCT VOLUME: 350 ML
UNIT PRODUCT VOLUME: 350 ML
UNIT RH: NORMAL
UNIT RH: NORMAL
VENTRICULAR RATE: 64 BPM
WBC # BLD AUTO: 6.72 THOUSAND/UL (ref 4.31–10.16)

## 2024-07-05 PROCEDURE — 87340 HEPATITIS B SURFACE AG IA: CPT | Performed by: PHYSICIAN ASSISTANT

## 2024-07-05 PROCEDURE — 93005 ELECTROCARDIOGRAM TRACING: CPT

## 2024-07-05 PROCEDURE — 93321 DOPPLER ECHO F-UP/LMTD STD: CPT | Performed by: INTERNAL MEDICINE

## 2024-07-05 PROCEDURE — 85027 COMPLETE CBC AUTOMATED: CPT

## 2024-07-05 PROCEDURE — 99223 1ST HOSP IP/OBS HIGH 75: CPT | Performed by: INTERNAL MEDICINE

## 2024-07-05 PROCEDURE — 84484 ASSAY OF TROPONIN QUANT: CPT

## 2024-07-05 PROCEDURE — 82948 REAGENT STRIP/BLOOD GLUCOSE: CPT

## 2024-07-05 PROCEDURE — 80048 BASIC METABOLIC PNL TOTAL CA: CPT

## 2024-07-05 PROCEDURE — 93325 DOPPLER ECHO COLOR FLOW MAPG: CPT

## 2024-07-05 PROCEDURE — 93325 DOPPLER ECHO COLOR FLOW MAPG: CPT | Performed by: INTERNAL MEDICINE

## 2024-07-05 PROCEDURE — 86706 HEP B SURFACE ANTIBODY: CPT | Performed by: PHYSICIAN ASSISTANT

## 2024-07-05 PROCEDURE — 93308 TTE F-UP OR LMTD: CPT | Performed by: INTERNAL MEDICINE

## 2024-07-05 PROCEDURE — 85730 THROMBOPLASTIN TIME PARTIAL: CPT

## 2024-07-05 PROCEDURE — 93308 TTE F-UP OR LMTD: CPT

## 2024-07-05 PROCEDURE — 86705 HEP B CORE ANTIBODY IGM: CPT | Performed by: PHYSICIAN ASSISTANT

## 2024-07-05 PROCEDURE — 86803 HEPATITIS C AB TEST: CPT | Performed by: PHYSICIAN ASSISTANT

## 2024-07-05 PROCEDURE — 86704 HEP B CORE ANTIBODY TOTAL: CPT | Performed by: PHYSICIAN ASSISTANT

## 2024-07-05 PROCEDURE — 85730 THROMBOPLASTIN TIME PARTIAL: CPT | Performed by: INTERNAL MEDICINE

## 2024-07-05 PROCEDURE — 93321 DOPPLER ECHO F-UP/LMTD STD: CPT

## 2024-07-05 PROCEDURE — 36415 COLL VENOUS BLD VENIPUNCTURE: CPT

## 2024-07-05 RX ORDER — OXYCODONE HYDROCHLORIDE 10 MG/1
10 TABLET ORAL EVERY 4 HOURS PRN
Status: DISCONTINUED | OUTPATIENT
Start: 2024-07-05 | End: 2024-07-08

## 2024-07-05 RX ORDER — ACETAMINOPHEN 325 MG/1
650 TABLET ORAL EVERY 6 HOURS PRN
Status: DISCONTINUED | OUTPATIENT
Start: 2024-07-05 | End: 2024-07-20

## 2024-07-05 RX ORDER — CARVEDILOL 6.25 MG/1
6.25 TABLET ORAL 2 TIMES DAILY WITH MEALS
Status: DISCONTINUED | OUTPATIENT
Start: 2024-07-05 | End: 2024-07-06

## 2024-07-05 RX ORDER — ATORVASTATIN CALCIUM 40 MG/1
80 TABLET, FILM COATED ORAL DAILY
Status: DISCONTINUED | OUTPATIENT
Start: 2024-07-05 | End: 2024-07-26 | Stop reason: HOSPADM

## 2024-07-05 RX ORDER — LABETALOL HYDROCHLORIDE 5 MG/ML
10 INJECTION, SOLUTION INTRAVENOUS EVERY 6 HOURS PRN
Status: DISCONTINUED | OUTPATIENT
Start: 2024-07-05 | End: 2024-07-19

## 2024-07-05 RX ORDER — FERROUS SULFATE 325(65) MG
325 TABLET ORAL 2 TIMES DAILY WITH MEALS
Status: DISCONTINUED | OUTPATIENT
Start: 2024-07-05 | End: 2024-07-15

## 2024-07-05 RX ORDER — INSULIN GLARGINE 100 [IU]/ML
12 INJECTION, SOLUTION SUBCUTANEOUS
Status: DISCONTINUED | OUTPATIENT
Start: 2024-07-05 | End: 2024-07-05

## 2024-07-05 RX ORDER — AMLODIPINE BESYLATE 5 MG/1
5 TABLET ORAL DAILY
Status: DISCONTINUED | OUTPATIENT
Start: 2024-07-05 | End: 2024-07-06

## 2024-07-05 RX ORDER — INSULIN LISPRO 100 [IU]/ML
4-20 INJECTION, SOLUTION INTRAVENOUS; SUBCUTANEOUS
Status: DISCONTINUED | OUTPATIENT
Start: 2024-07-05 | End: 2024-07-05

## 2024-07-05 RX ORDER — SEVELAMER HYDROCHLORIDE 800 MG/1
800 TABLET, FILM COATED ORAL
Status: DISCONTINUED | OUTPATIENT
Start: 2024-07-05 | End: 2024-07-26 | Stop reason: HOSPADM

## 2024-07-05 RX ORDER — TORSEMIDE 20 MG/1
40 TABLET ORAL DAILY
Status: DISCONTINUED | OUTPATIENT
Start: 2024-07-05 | End: 2024-07-06

## 2024-07-05 RX ORDER — METOPROLOL SUCCINATE 25 MG/1
25 TABLET, EXTENDED RELEASE ORAL DAILY
Status: DISCONTINUED | OUTPATIENT
Start: 2024-07-05 | End: 2024-07-05

## 2024-07-05 RX ORDER — ISOSORBIDE MONONITRATE 60 MG/1
60 TABLET, EXTENDED RELEASE ORAL DAILY
Status: DISCONTINUED | OUTPATIENT
Start: 2024-07-05 | End: 2024-07-26 | Stop reason: HOSPADM

## 2024-07-05 RX ADMIN — HEPARIN SODIUM 20 UNITS/KG/HR: 10000 INJECTION, SOLUTION INTRAVENOUS at 06:54

## 2024-07-05 RX ADMIN — SEVELAMER HYDROCHLORIDE 800 MG: 800 TABLET ORAL at 07:21

## 2024-07-05 RX ADMIN — NITROGLYCERIN 1 INCH: 20 OINTMENT TOPICAL at 00:27

## 2024-07-05 RX ADMIN — HEPARIN SODIUM 22 UNITS/KG/HR: 10000 INJECTION, SOLUTION INTRAVENOUS at 16:05

## 2024-07-05 RX ADMIN — ASPIRIN 81 MG: 81 TABLET, COATED ORAL at 08:45

## 2024-07-05 RX ADMIN — HEPARIN SODIUM 5000 UNITS: 1000 INJECTION INTRAVENOUS; SUBCUTANEOUS at 10:29

## 2024-07-05 RX ADMIN — METOPROLOL SUCCINATE 25 MG: 25 TABLET, EXTENDED RELEASE ORAL at 08:45

## 2024-07-05 RX ADMIN — ACETAMINOPHEN 650 MG: 325 TABLET, FILM COATED ORAL at 12:51

## 2024-07-05 RX ADMIN — FERROUS SULFATE TAB 325 MG (65 MG ELEMENTAL FE) 325 MG: 325 (65 FE) TAB at 07:21

## 2024-07-05 RX ADMIN — PERFLUTREN 0.4 ML/MIN: 6.52 INJECTION, SUSPENSION INTRAVENOUS at 11:58

## 2024-07-05 RX ADMIN — HEPARIN SODIUM 5000 UNITS: 1000 INJECTION INTRAVENOUS; SUBCUTANEOUS at 17:08

## 2024-07-05 RX ADMIN — HEPARIN SODIUM 5000 UNITS: 1000 INJECTION INTRAVENOUS; SUBCUTANEOUS at 03:47

## 2024-07-05 RX ADMIN — SEVELAMER HYDROCHLORIDE 800 MG: 800 TABLET ORAL at 11:54

## 2024-07-05 RX ADMIN — TORSEMIDE 40 MG: 20 TABLET ORAL at 08:46

## 2024-07-05 RX ADMIN — AMLODIPINE BESYLATE 5 MG: 5 TABLET ORAL at 12:49

## 2024-07-05 RX ADMIN — ISOSORBIDE MONONITRATE 60 MG: 60 TABLET, EXTENDED RELEASE ORAL at 08:46

## 2024-07-05 RX ADMIN — ATORVASTATIN CALCIUM 80 MG: 40 TABLET, FILM COATED ORAL at 08:44

## 2024-07-05 NOTE — ASSESSMENT & PLAN NOTE
Patient developed LLE lymphedema after left lower extremity surgical procedure several years ago.

## 2024-07-05 NOTE — ASSESSMENT & PLAN NOTE
Patient admitted to the ED for worsening chest pain, shortness of breath, and dyspnea on exertion.  Patient was admitted for similar chest pain in early June and was found to have ESRD, started on HD MWF   D-Dimer 4.21  CTA showed subsegmental right lower lobe pulmonary embolism  Placed on heparin drip  Patient requiring 2-4 L nasal cannula.  Echo:  Left Ventricle: Left ventricular cavity size is normal. There is mild concentric hypertrophy. The left ventricular ejection fraction is %. Systolic function is normal. Wall motion is normal. Diastolic function is normal.  Right Ventricle: Right ventricular cavity size is normal. Systolic function is normal.  Tricuspid Valve: There is trace regurgitation.  Prior TTE study available for comparison. Prior study date: 6/4/2024. No significant changes noted compared to the prior study.    Plan-  Continue Heparin drip  Hold on starting Eliquis or oral AC as nephro is considering a renal biopsy  Continue supplemental oxygen as needed  Monitor telemetry

## 2024-07-05 NOTE — ASSESSMENT & PLAN NOTE
D-Dimer 4.21  CTA showed subsegmental right lower lobe pulmonary embolism  Placed on heparin drip  Monitor telemetry  F/u echocardiogram

## 2024-07-05 NOTE — CONSULTS
Consultation - Nephrology   Joaquin ROSEN Marlys 44 y.o. male MRN: [id number]  Unit/Bed#: ED-31 Encounter: [id number]    ASSESSMENT/PLAN:  CLOVIS, POA, on CKD III, currently HD dependent MWJOANN @[place] [place] :  -Etiology of CLOVIS: suspected MGRS  -Etiology of CKD: Suspected due to diabetic nephropathy, hypertensive nephrosclerosis, obesity related renal dysfunction  -Previous baseline creatinine 1.3-1.6 w/prior episodes of CLOVIS  -Imaging: prior CT with no hydronephrosis  -serologies: ANCA, anti GBM, JOSE ROBERTO, dsDNA negative. C 3,4 and K/L ration normal.  SPEP/UEPEP +IgG Kappa monoclonal band, bet-2 microglobulin elevated  -Bone marrow biopsy 6/12/2024: negative for plasma cell neoplasm  -CRRT initiated 6/7/2024.  Peak creatinine 10.4  -last treatment 7/3/2024 with UF 2.2L  -EDW: 181 kg  -electrolytes and acid/base stable  -Plan for HD today  -next treatment 7/8/2024.  Will plan to continue regular Monday, Wednesday, Friday schedule during hospitalization.  -given negative bone marrow biopsy, renal biopsy would be beneficial but precluded due to acute PE and need for uninterrupted anticoagulation therapy.  -will d/w Dr. Spear    Access: R IJV PermCath   -site clear  -continue to use cath for hemodialysis access    Acute RLL PE:  -p/w SOB  -CTA chest with subsegmental RLL PE with no evidence of right heart strain  -Echo 7/5/2024: Pending  -Requiring NC O2  -Continue heparin drip  -Management per primary team    Acute hypoxic respiratory failure:  -In the setting of acute PE  -O2 sat stable on 4 LNC O2  -Continue to monitor  -Management per primary team    HTN/volume status:  -BP hypertensive initially, now improved  -volume status mild hypervolemia  -maintain goal BP <140/90  -home medications: Imdur 60 mg daily, Toprol-XL 25 mg daily, torsemide 40 mg daily  -current medications: Imdur 60 mg daily, Toprol-XL 20 mg daily, torsemide 40 mg daily  -continue UF with dialysis as BP tolerates  -continue to monitor    Acute on chronic  anemia in CKD:  -Hgb 6.6 on admission, s/p 2 units pRBC  -Hbg 7.6 today after transfusion, below goal.  Goal Hgb >10  -prior SPEP/UPEP with + IgG kappa monoclonal bands  -Bone marrow biopsy 6/12/2024:  No definitive morphologic or immunophenotypic evidence of plasma cell neoplasm. FANCD2 mutation of uncertain significance.  Normocellular bone marrow (60-70% cellularity) with myeloid predominant trilineage maturing hematopoiesis, mild megakaryocytic hyperplasia, and no increase in blasts.  -Hematology following with outpatient f/u scheduled 7/11/2024.  -No JOSSELYN in the setting of acute PE  -On oral iron twice daily  -continue to monitor  -transfuse for Hgb <7.0  -Management per primary team    Bone mineral disease of renal origin:  -Hyperphosphatemia: Continue sevelamer 800 mg 3 times daily with meals.  Low phosphorus diet  -Renal diet  -continue to monitor phosphorus, PTH, calcium, magnesium as outpatient    Chronic LLE lymphedema:  -Chronic LLE lymphedema x 3 years after surgical resection of LLE cyst  -Continue compression and elevation of lower extremity for symptomatic edema relief  -Management per primary team      HISTORY OF PRESENT ILLNESS:  Requesting Physician: Gail Caballero MD  Reason for Consult: CLOVIS, POA, HD dependent    Joaquin Frankel is a 44 y.o. year old male former smoker with hx recent CLOVIS now HD dependent since 6/7/2024, +IgG Kappa monoclonal gammopathy, CKD III, DM2, HTN, HLD, CAD, s/p PCI/stent, morbid obesity who was admitted to [facility] after presenting with chest pain.  Patient complains of chest pain for the past month which is progressively become worse over the past 3 days.  CTA chest on admission with evidence of subsegmental RLL PE without RV strain.  Heparin drip initiated.  Patient requiring 4 LNC O2.  Patient previously hospitalized at [facility] 6/3-6/26/2024 with evidence of acute renal failure at which time HD was initiated on 6/7/2024.  Workup at that time notable for IgG kappa  "monoclonal gammopathy and s/p bone marrow biopsy on 6/12/2023 to assess for multiple myeloma.  Bone marrow biopsy with no evidence of plasma cell neoplasm.  Last HD 7/3/2024 with UF 2.2L. A renal consultation is requested today for assistance in the management of ESRD. Joaquin Frankel is a known ESRD patient who undergoes maintenance hemodialysis at [facility] (formerly [facility]) on Monday, Wednesday, Friday.    PAST MEDICAL HISTORY:  Past Medical History:   Diagnosis Date    Acute hypoxic respiratory failure (HCC) 10/07/2023    Arthritis     Breathing difficulty     Cellulitis 10/07/2023    Coronary artery disease     Diabetes mellitus ([facility])     Diabetic neuropathy ([facility]) 05/28/2021    Heart disease     CAD   s/p ptca with 1 stent 2012    Hidradenitis suppurativa     groin    History of transfusion     Hyperlipidemia     Hypertension     MI (myocardial infarction) ([facility])     \" silent \" M.I. in the past    Morbid obesity with BMI of 50.0-59.9, adult ([facility])     Nicotine dependence     Obesity     Pilonidal cyst     Type 1 non-ST elevation myocardial infarction (NSTEMI) ([facility]) 11/29/2020       PAST SURGICAL HISTORY:  Past Surgical History:   Procedure Laterality Date    CARDIAC SURGERY      CORONARY ANGIOPLASTY WITH STENT PLACEMENT      INCISION AND DRAINAGE OF WOUND N/A 1/24/2017    Procedure: INCISION AND DRAINAGE (I&D) BUTTOCK, PILONIDAL CYST;  Surgeon: Simba Adhikari MD;  Location: [facility];  Service:     IR BIOPSY BONE MARROW  6/12/2024    IR TEMPORARY DIALYSIS CATHETER PLACEMENT  6/6/2024    IR TUNNELED CENTRAL LINE PLACEMENT  6/14/2024    LEG SURGERY Left     I&D of left leg 2012    ID DEBRIDEMENT SUBCUTANEOUS TISSUE 1ST 20 SQ CM/< Left 7/6/2021    Procedure: DEBRIDEMENT WOUND (WASH OUT);  Surgeon: Sudheer Mcfarlane MD;  Location: [facility];  Service: General    ID EXC B9 LESION MRGN XCP SK TG T/A/L >4.0 CM Left 4/6/2021    Procedure: EXCISION THIGH MASS;  Surgeon: Sudheer Mcfarlane MD;  [facility];  Service: General    ID " EXCISION H/P/P/U COMPLEX REPAIR Left 7/6/2021    Procedure: EXCISION PERINEAL ABSCESS LEFT THIGH;  Surgeon: Sudheer Mcfarlane MD;  [facility]: [facility];  Service: General    ND NEGATIVE PRESSURE WOUND THERAPY DME <= 50 SQ CM Left 4/6/2021    Procedure: APPLICATION VAC DRESSING THIGH;  Surgeon: Sudheer Mcfarlane MD;  [facility];  Service: General    WOUND DEBRIDEMENT Left 4/17/2021    Procedure: DEBRIDEMENT WOUND AND DRESSING CHANGE (WASH OUT);  Surgeon: Mahin Traore DO;  [facility];  Service: General    WOUND DEBRIDEMENT Left 4/22/2021    Procedure: DEBRIDEMENT LOWER EXTREMITY (WASH OUT), left groin and thigh;  Surgeon: Sudheer Mcfarlane MD;  [facility];  Service: General    WOUND DEBRIDEMENT Left 5/26/2021    Procedure: DEBRIDEMENT LOWER EXTREMITY (WASH OUT);  Surgeon: Zak Castanon DO;  [facility];  Service: General    WOUND DEBRIDEMENT Left 5/28/2021    Procedure: Washout left thigh wound and closure;  Surgeon: Amor Jaramillo DO;  [facility]: [facility];  Service: General       ALLERGIES:  Allergies   Allergen Reactions    Keflex [Cephalexin] Facial Swelling and Lip Swelling    Amoxicillin Hives     childhood       SOCIAL HISTORY:  Social History     Substance and Sexual Activity   Alcohol Use Not Currently    Comment: rarely     Social History     Substance and Sexual Activity   Drug Use No     Social History     Tobacco Use   Smoking Status Former    Current packs/day: 0.00    Average packs/day: 1.5 packs/day for 20.0 years (29.9 ttl pk-yrs)    Types: Cigarettes    Start date: 01/2021    Quit date: 03/2021    Years since quitting: 3.3    Passive exposure: Past   Smokeless Tobacco Never       FAMILY HISTORY:  Family History   Problem Relation Age of Onset    Heart disease Father     Diabetes Father     Hypertension Mother     Heart disease Mother        MEDICATIONS:    Current Facility-Administered Medications:     acetaminophen (TYLENOL) tablet 650 mg, 650 mg, Oral, Q6H PRN,  Mily Martin MD    aspirin (ECOTRIN LOW STRENGTH) EC tablet 81 mg, 81 mg, Oral, Daily, Mily Martin MD, 81 mg at 07/05/24 0845    atorvastatin (LIPITOR) tablet 80 mg, 80 mg, Oral, Daily, Mily Martin MD, 80 mg at 07/05/24 0844    ferrous sulfate tablet 325 mg, 325 mg, Oral, BID With Meals, Mily Martin MD, 325 mg at 07/05/24 0721    heparin (porcine) 25,000 units in 0.45% NaCl 250 mL infusion (premix), 3-30 Units/kg/hr (Order-Specific), Intravenous, Titrated, Mily Martin MD, Last Rate: 27.5 mL/hr at 07/05/24 1033, 22 Units/kg/hr at 07/05/24 1033    heparin (porcine) injection 10,000 Units, 10,000 Units, Intravenous, Q6H PRN, Monique Rothman DO    heparin (porcine) injection 5,000 Units, 5,000 Units, Intravenous, Q6H PRN, Monique Rothman DO, 5,000 Units at 07/05/24 1029    isosorbide mononitrate (IMDUR) 24 hr tablet 60 mg, 60 mg, Oral, Daily, Mily Martin MD, 60 mg at 07/05/24 0846    metoprolol succinate (TOPROL-XL) 24 hr tablet 25 mg, 25 mg, Oral, Daily, Mily Martin MD, 25 mg at 07/05/24 0845    oxyCODONE (ROXICODONE) immediate release tablet 10 mg, 10 mg, Oral, Q4H PRN, Mily Martin MD    sevelamer (RENAGEL) tablet 800 mg, 800 mg, Oral, TID With Meals, Mily Martin MD, 800 mg at 07/05/24 0721    torsemide (DEMADEX) tablet 40 mg, 40 mg, Oral, Daily, Mily Martin MD, 40 mg at 07/05/24 0846    Current Outpatient Medications:     aspirin (ECOTRIN LOW STRENGTH) 81 mg EC tablet, Take 1 tablet (81 mg total) by mouth daily, Disp: 30 tablet, Rfl: 0    atorvastatin (LIPITOR) 80 mg tablet, Take 1 tablet (80 mg total) by mouth daily, Disp: 90 tablet, Rfl: 3    Cholecalciferol (VITAMIN D3) 1,000 units tablet, Take 2 tablets (2,000 Units total) by mouth daily, Disp: 60 tablet, Rfl: 0    cyanocobalamin (VITAMIN B-12) 1000 MCG tablet, Take 1 tablet (1,000 mcg total) by mouth daily, Disp: 30 tablet, Rfl: 0    ferrous sulfate 324 (65 Fe) mg, Take 1 tablet (324 mg  total) by mouth 2 (two) times a day before meals, Disp: 60 tablet, Rfl: 0    isosorbide mononitrate (IMDUR) 60 mg 24 hr tablet, Take 1 tablet (60 mg total) by mouth daily, Disp: 30 tablet, Rfl: 0    metoprolol succinate (TOPROL-XL) 25 mg 24 hr tablet, Take 1 tablet (25 mg total) by mouth daily, Disp: 30 tablet, Rfl: 0    oxyCODONE (ROXICODONE) 10 MG TABS, Take 1 tablet (10 mg total) by mouth every 4 (four) hours as needed for moderate pain or severe pain Max Daily Amount: 60 mg, Disp: 20 tablet, Rfl: 0    sevelamer (RENAGEL) 800 mg tablet, Take 1 tablet (800 mg total) by mouth 3 (three) times a day with meals, Disp: 90 tablet, Rfl: 0    torsemide (DEMADEX) 20 mg tablet, Take 2 tablets (40 mg total) by mouth daily, Disp: 60 tablet, Rfl: 0    Ascorbic Acid (vitamin C) 100 MG tablet, Take 1 tablet (100 mg total) by mouth daily, Disp: 30 tablet, Rfl: 0    docusate sodium (COLACE) 100 mg capsule, Take 1 capsule (100 mg total) by mouth 2 (two) times a day, Disp: 20 capsule, Rfl: 0    glimepiride (AMARYL) 2 mg tablet, Take 1 tablet (2 mg total) by mouth daily with breakfast, Disp: 30 tablet, Rfl: 3    insulin glargine (LANTUS) 100 units/mL subcutaneous injection, Inject 12 Units under the skin daily at bedtime Do not start before February 21, 2024., Disp: 10 mL, Rfl: 0    Insulin Pen Needle (BD ULTRA-FINE PEN NEEDLES) 29G X 12.7MM MISC, USE 1 NEEDLE ONCE DAILY FOR INJECTION UNDER THE SKIN, Disp: 50 each, Rfl: 3    lidocaine (LIDODERM) 5 %, Apply 1 patch topically over 12 hours daily Remove & Discard patch within 12 hours or as directed by MD (Patient not taking: Reported on 7/4/2024), Disp: 30 patch, Rfl: 0    nystatin (MYCOSTATIN) powder, Apply 1 Application topically 2 (two) times a day (Patient not taking: Reported on 7/4/2024), Disp: 15 g, Rfl: 0    REVIEW OF SYSTEMS:  A complete 10 point review of systems was performed and found to be negative unless otherwise noted below or in the HPI.  General: No fevers, chills.    Cardiovascular: No chest pain, + shortness of breath, no palpitations, + chronic left lower leg edema.  Respiratory: No cough, no sputum production, + shortness of breath.  Gastrointestinal: No nausea, vomiting, abdominal pain, diarrhea.  Genitourinary: No hematuria.    PHYSICAL EXAM:  Current Weight:    First Weight:    Vitals:    07/05/24 0600 07/05/24 0730 07/05/24 0800 07/05/24 0845   BP: 161/69 169/76 167/79    BP Location: Right arm Right arm Right arm    Pulse: 55 56 56 74   Resp: 16 16 16    Temp:       TempSrc:       SpO2: 96% 93% 92%        Intake/Output Summary (Last 24 hours) at 7/5/2024 1057  Last data filed at 7/4/2024 2305  Gross per 24 hour   Intake 700 ml   Output --   Net 700 ml     General:  Awake, alert, appears comfortable and in no acute distress.  Nontoxic.  Morbidly obese  Skin:  No rash, warm, good skin turgor   Eyes:  PERRL, EOMI, sclerae nonicteric.  no periorbital edema   ENT:  Moist mucous membranes  Neck:  Trachea midline, symmetric.  No JVD.  No carotid bruits.  Chest:  Clear to auscultation bilaterally without wheezes, crackles or rhonchi  CVS:  Regular rate and rhythm without murmur, gallop or rub.  S1 and S2 identified and normal.  No S3, S4.   Abdomen:  Soft, nontender, nondistended without masses.  Normal bowel sounds x 4 quadrants.  No bruit.  Extremities:  Warm, pink, motor and sensory intact and well perfused.  No cyanosis, pallor.  No RLE edema.  Left lower leg lymphedema with chronic changes, lipodermatosclerosis.  Neuro:  Awake, alert, oriented x3.  Grossly intact  Psych:  Appropriate affect.  Mentating appropriately.  Normal mental status exam  Access: R IJV PermCath site clear.  Dressing intact       Invasive Devices:      Lab Results:   Results from last 7 days   Lab Units 07/05/24  0411 07/04/24  1538   WBC Thousand/uL 6.72 6.92   HEMOGLOBIN g/dL 7.6* 6.6*   HEMATOCRIT % 24.4* 21.2*   PLATELETS Thousands/uL 229 254   SODIUM mmol/L 137 135   POTASSIUM mmol/L 4.5 4.5    CHLORIDE mmol/L 97 96   CO2 mmol/L 31 27   BUN mg/dL 32* 27*   CREATININE mg/dL 9.93* 9.02*   CALCIUM mg/dL 8.8 9.0   MAGNESIUM mg/dL  --  1.9   ALK PHOS U/L  --  90   ALT U/L  --  <3*   AST U/L  --  15     Lab Results   Component Value Date    .1 (H) 06/05/2024    CALCIUM 8.8 07/05/2024    PHOS 7.9 (H) 06/26/2024   Imaging study:  CTA chest PE study 7/4/2024:  Imaging study and images personally reviewed.  Subsegmental RLL PE.  Diffuse patchy groundglass opacities most evident in LISA.  Small right pleural effusion.    CXR 7/4/2024:  Imaging studies and images personally reviewed.  Mild pulmonary venous congestion.    EMR, including [facility], [employer],  [facility] and outpatient notes reviewed.  I have personally reviewed the blood work as stated above and in my note.  I have personally reviewed CXR, CTA chest imaging studies.  I have personally reviewed primary medical service and previous nephrology note.

## 2024-07-05 NOTE — ASSESSMENT & PLAN NOTE
History of HTN, recent medication change after dx of ESRD  Workup at that time notable for IgG kappa monoclonal gammopathy and s/p bone marrow biopsy on 6/12/2023 to assess for multiple myeloma. Bone marrow biopsy with no evidence of plasma cell neoplasm.   HD on MWF schedule  Presented to ED with uncontrolled /80  Endorsed headaches, and blurry vision in his left eye in the ED that has since resolved.    Plan:  Continue Toprol XL 25mg daily, Torsemide 40mg daily, Imdur 60mg once daily  Start p.o. amlodipine 5 mg daily  Hold Toprol if HR <50  Nephrology consulted, recommendations appreciated  Continue UF with dialysis as BP tolerates

## 2024-07-05 NOTE — ASSESSMENT & PLAN NOTE
Lab Results   Component Value Date    EGFR 6 07/04/2024    EGFR 5 06/26/2024    EGFR 4 06/25/2024    CREATININE 9.02 (H) 07/04/2024    CREATININE 10.53 (H) 06/26/2024    CREATININE 12.46 (H) 06/25/2024     Patient ESRD on Dialysis 3 times a week  Elevated , c/o of orthopnea   Will consult nephrology

## 2024-07-05 NOTE — ASSESSMENT & PLAN NOTE
History of HTN, recent medication change after dx of ESRD  Presented to ED with uncontrolled /80  Endorsed headaches, and blurry vision in his left eye.    Continue Toprol XL 25mg daily, Torsemide 20mg BID, Imdur 60mg once daily  Hold Toprol if HR <50

## 2024-07-05 NOTE — ASSESSMENT & PLAN NOTE
Patient reports worsening substernal chest pain over several weeks.  H/o chronic angina, CAD, family history of hereditary heart disease on fathers side.   Patient was admitted for similar chest pain in early June and was found to have ESRD, started on HD MWF.  EKG on admission: Sinus Rhythm with PAC's  Troponin's negative x3  BNP (990)  D-Dimer 4.21  CTA subsegmental right lower lobe pulmonary embolism  Repeat EKG showed sinus bradycardia  Patient blood pressure elevated on admission. 194/80   Patient was saturating low 80s on room air on admission and required 3L supplemental O2, which improved saturation to 90s.  Patient received 2 doses of nitroglycerin in ED and reports it did not improve his pain, but it improved his elevated blood pressure.      Plan-  Per cardiology recs:   Continue amlodipine 5 mg daily  D/C metoprolol succinate   Start Coreg 6.25 mg BID  Continue Imdur 60 mg daily  Continue aspirin 81 mg daily.  Continue atorvastatin 80 mg daily  Monitor on telemetry

## 2024-07-05 NOTE — PROGRESS NOTES
Outpatient Care Management Note:    ADT alert received. Patient is currently admitted with chest pain symptoms.  CM team will attempt to outreach on discharge.

## 2024-07-05 NOTE — ED NOTES
"This RN sent 0-hr Troponin down to lab at 0025. At this time (0323), Troponin still says \"in process.\" Saira ROSEN RN called the lab to question why Troponin is still in process to which the lab said they did not have specimen. A few seconds later, the lab said that they found the specimen and would begin running it now. Incident report will be filed at this time.      Mariela Macedo RN  07/05/24 0337    "

## 2024-07-05 NOTE — ASSESSMENT & PLAN NOTE
Chronic LLE lymphedema x 3 years after surgical resection of LLE cyst    Plan:  Continue compression and elevation of lower extremity for symptomatic edema relief

## 2024-07-05 NOTE — CONSULTS
Cardiology   Joaquin Frankel 44 y.o. male MRN: [id number]  Unit/Bed#: ED-31 Encounter: [id number]      Reason for Consult / Principal Problem: Chest pain     Physician Requesting Consult:  Gail Caballero MD    Outpatient Cardiologist:Dr. Fulton    Assessment  1. Chest pain  -Pt reports a 2-month history of progressive exertional CP/dyspnea.  Recent Randi MPI study demonstrated no reversible perfusion defects.  Cardiac workup thus far this admission unremarkable for ischemia (ECG w/ acute changes and HS troponin levels (-) x 2).  Symptoms possibly secondary to # 2 +/- uncontrolled HTN +/- microvascular angina.   2. Acute RLL PE  -CTA chest PE study 7/4; subsegmental RLL PE.  No CT evidence of right heart strain.  -Limited TTE 7/5/2024; normal LV systolic function, diastolic function normal, RV normal size/systolic function, LA/RA normal, no significant valvular disease.  -On IV heparin GTT VTE/PE high protocol.  3. CAD s/p PCI/stenting to the LCX (in 2010) and PCI/stenting to the (RCA in 2020)   -Randi MPI study 6/24/2024; small to medium sized perfusion defect in the apical location that is predominantly fixed, no major reversible perfusion defect.  -[facility] procedure 12/2020; 30% proximal RCA stenosis, 80% mid RCA stenosis, 40% distal RCA stenosis, 30% mid LCx stenosis, minor luminal irregularities in the LAD and LM.  4. Hypertension  -BP elevated at 206/95 on admission.  BP last recorded 148/70.  -Outpatient BP regimen; Imdur 60 mg daily, metoprolol succinate 25 mg daily, and torsemide 40 mg daily.  -Inpatient BP regimen; amlodipine 5 mg daily, Imdur 60 mg daily, metoprolol succinate 25 mg daily and torsemide 40 mg daily.  5. Dyslipidemia  -Lipid profile 4/29/2024; cholesterol 184, triglyceride 198, HDL 28 and LDL calculated 121.  -On atorvastatin 80 mg daily  6. DM type II  -HbA1c 5.7.  7. ESRD on HD  -Management per the nephrology team.  8. Morbid obesity; BMI 62.6    Plan  -No evidence of ACS this admission  (ECG/trop levels unremarkable). Recent ischemic testing (Randi MPI) also unremarkable for ischemia. CP symptoms possibly secondary to # 2 +/- uncontrolled HTN +/- microvascular angina. Favor optimized medical management for now (needs improved BP control and optimization of his volume status). Continue amlodipine 5 mg daily (added today), DC metoprolol succinate, start Coreg 6.25 mg BID, and continue Imdur 60 mg daily. Volume management via HD + oral diuretics being managed by Nephro. Would consider cardiac cath procedure if still symptomatic despite euvolemic state and well controlled BP.   -IV heparin GTT in reference to acute PE. Timing/selection of DOAC per the primary team.   -Continue aspirin 81 mg daily.  -Continue high intensity statin.   -Continue to monitor on telemetry.           HPI: Joaquin JESSIKA Frankel 44 y.o. year old male with history of CAD s/p PCI/stenting to the LCX (in 2010) and PCI/stenting to the (RCA in 2020), hypertension, dyslipidemia, chronic L LE lymphedema, morbid obesity, and ESRD on HD.  Non-smoker, denies excessive alcohol or recreational/illicit drug use.  He follows with [facility] Hinkle [facility].  He was recently hospitalized at the [facility] 6/3 through 6/26/2024 for initially presenting with generalized/total body pain but more specifically progressive left-sided exertional CP/dyspnea and increasing bilateral lower extremity edema 1 month prior to this presentation.  He was found to have significant acute kidney injury with a peak creatinine level of 10.4 and subsequently initiated on HD during this admission.  He was evaluated by the  Cardiology team given progressive symptoms of exertional CP/dyspnea with concern for possible angina.  He underwent a pharmacologic stress test during this admission which demonstrated a small fixed apical defect with no major reversible perfusion defects.  Optimized medical management was recommended.  He presents now to the [facility]  "with complaints of chest pain.  Since his most recent admission to the [facility] he has been experiencing even more progressive symptoms of exertional CP/dyspnea to the point where he is no longer able to perform his usual mildly exertional ADLs without reproduction of these symptoms.  Initial ECG in the ED demonstrated NSR with no acute ischemic changes.  HS troponin levels unremarkable.  Underwent CTA imaging of the chest/PE study which demonstrated a subsegmental right lower lobe PE with no CT evidence of right heart strain.  Cardiology has now been consulted for further treatment recommendations/management regarding his chest pain.     Family History:   Family History   Problem Relation Age of Onset    Heart disease Father     Diabetes Father     Hypertension Mother     Heart disease Mother      Historical Information   Past Medical History:   Diagnosis Date    Acute hypoxic respiratory failure ([facility]) 10/07/2023    Arthritis     Breathing difficulty     Cellulitis 10/07/2023    Coronary artery disease     Diabetes mellitus ([facility])     Diabetic neuropathy ([facility]) 05/28/2021    Heart disease     CAD   s/p ptca with 1 stent 2012    Hidradenitis suppurativa     groin    History of transfusion     Hyperlipidemia     Hypertension     MI (myocardial infarction) ([facility])     \" silent \" M.I. in the past    Morbid obesity with BMI of 50.0-59.9, adult ([facility])     Nicotine dependence     Obesity     Pilonidal cyst     Type 1 non-ST elevation myocardial infarction (NSTEMI) ([facility]) 11/29/2020     Past Surgical History:   Procedure Laterality Date    CARDIAC SURGERY      CORONARY ANGIOPLASTY WITH STENT PLACEMENT      INCISION AND DRAINAGE OF WOUND N/A 1/24/2017    Procedure: INCISION AND DRAINAGE (I&D) BUTTOCK, PILONIDAL CYST;  Surgeon: Simba Adhikari MD;  Location: [facility];  Service:     IR BIOPSY BONE MARROW  6/12/2024    IR TEMPORARY DIALYSIS CATHETER PLACEMENT  6/6/2024    IR TUNNELED CENTRAL LINE PLACEMENT  6/14/2024    LEG " SURGERY Left     I&D of left leg 2012    CA DEBRIDEMENT SUBCUTANEOUS TISSUE 1ST 20 SQ CM/< Left 7/6/2021    Procedure: DEBRIDEMENT WOUND (WASH OUT);  Surgeon: Sudheer Mcfarlane MD;  [facility]: [facility];  Service: General    CA EXC B9 LESION MRGN XCP SK TG T/A/L >4.0 CM Left 4/6/2021    Procedure: EXCISION THIGH MASS;  Surgeon: Sudheer Mcfarlane MD;  [facility];  Service: General    CA EXCISION H/P/P/U COMPLEX REPAIR Left 7/6/2021    Procedure: EXCISION PERINEAL ABSCESS LEFT THIGH;  Surgeon: Sudheer Mcfarlane MD;  [facility];  Service: General    CA NEGATIVE PRESSURE WOUND THERAPY DME <= 50 SQ CM Left 4/6/2021    Procedure: APPLICATION VAC DRESSING THIGH;  Surgeon: Sudheer Mcfarlane MD;  [facility];  Service: General    WOUND DEBRIDEMENT Left 4/17/2021    Procedure: DEBRIDEMENT WOUND AND DRESSING CHANGE (WASH OUT);  Surgeon: Mahin Traore DO;  [facility];  Service: General    WOUND DEBRIDEMENT Left 4/22/2021    Procedure: DEBRIDEMENT LOWER EXTREMITY (WASH OUT), left groin and thigh;  Surgeon: Sudheer Mcfarlane MD;  [facility];  Service: General    WOUND DEBRIDEMENT Left 5/26/2021    Procedure: DEBRIDEMENT LOWER EXTREMITY (WASH OUT);  Surgeon: Zak Castanon DO;  [facility];  Service: General    WOUND DEBRIDEMENT Left 5/28/2021    Procedure: Washout left thigh wound and closure;  Surgeon: Amor Jaramillo DO;  [facility];  Service: General     Social History   Social History     Substance and Sexual Activity   Alcohol Use Not Currently    Comment: rarely     Social History     Substance and Sexual Activity   Drug Use No     Social History     Tobacco Use   Smoking Status Former    Current packs/day: 0.00    Average packs/day: 1.5 packs/day for 20.0 years (29.9 ttl pk-yrs)    Types: Cigarettes    Start date: 01/2021    Quit date: 03/2021    Years since quitting: 3.3    Passive exposure: Past   Smokeless Tobacco Never     Family History:   Family History   Problem  Relation Age of Onset    Heart disease Father     Diabetes Father     Hypertension Mother     Heart disease Mother        Review of Systems:  Review of Systems   Constitutional:  Negative for chills, fatigue and fever.   Respiratory:  Negative for cough and shortness of breath.    Cardiovascular:         +Exertional CP/dyspnea.    Gastrointestinal:  Negative for abdominal pain.   Neurological:  Negative for dizziness, light-headedness and headaches.   All other systems reviewed and are negative.          Scheduled Meds:  Current Facility-Administered Medications   Medication Dose Route Frequency Provider Last Rate    acetaminophen  650 mg Oral Q6H PRN Mily Martin MD      amLODIPine  5 mg Oral Daily Deya Palomino MD      aspirin  81 mg Oral Daily Mily Martin MD      atorvastatin  80 mg Oral Daily Mily Martin MD      ferrous sulfate  325 mg Oral BID With Meals Mily Martin MD      heparin (porcine)  3-30 Units/kg/hr (Order-Specific) Intravenous Titrated Mily Martin MD 22 Units/kg/hr (07/05/24 1033)    heparin (porcine)  10,000 Units Intravenous Q6H PRN Monique Rothman DO      heparin (porcine)  5,000 Units Intravenous Q6H PRN Monique Rothman DO      isosorbide mononitrate  60 mg Oral Daily Mily Martin MD      metoprolol succinate  25 mg Oral Daily Mily Martin MD      oxyCODONE  10 mg Oral Q4H PRN Mily Martin MD      sevelamer  800 mg Oral TID With Meals Mily Martin MD      torsemide  40 mg Oral Daily Mily Martin MD       Continuous Infusions:heparin (porcine), 3-30 Units/kg/hr (Order-Specific), Last Rate: 22 Units/kg/hr (07/05/24 1033)      PRN Meds:.  acetaminophen    heparin (porcine)    heparin (porcine)    oxyCODONE  all current active meds have been reviewed and current meds:   Current Facility-Administered Medications   Medication Dose Route Frequency    acetaminophen (TYLENOL) tablet 650 mg  650 mg Oral Q6H PRN    amLODIPine (NORVASC) tablet  "5 mg  5 mg Oral Daily    aspirin (ECOTRIN LOW STRENGTH) EC tablet 81 mg  81 mg Oral Daily    atorvastatin (LIPITOR) tablet 80 mg  80 mg Oral Daily    carvedilol (COREG) tablet 6.25 mg  6.25 mg Oral BID With Meals    ferrous sulfate tablet 325 mg  325 mg Oral BID With Meals    heparin (porcine) 25,000 units in 0.45% NaCl 250 mL infusion (premix)  3-30 Units/kg/hr (Order-Specific) Intravenous Titrated    heparin (porcine) injection 10,000 Units  10,000 Units Intravenous Q6H PRN    heparin (porcine) injection 5,000 Units  5,000 Units Intravenous Q6H PRN    isosorbide mononitrate (IMDUR) 24 hr tablet 60 mg  60 mg Oral Daily    oxyCODONE (ROXICODONE) immediate release tablet 10 mg  10 mg Oral Q4H PRN    sevelamer (RENAGEL) tablet 800 mg  800 mg Oral TID With Meals    torsemide (DEMADEX) tablet 40 mg  40 mg Oral Daily       Allergies   Allergen Reactions    Keflex [Cephalexin] Facial Swelling and Lip Swelling    Amoxicillin Hives     childhood       Objective   Vitals: Blood pressure 147/69, pulse (!) 49, temperature 97.6 °F (36.4 °C), temperature source Oral, resp. rate 18, height 5' 7\" (1.702 m), weight (!) 181 kg (400 lb), SpO2 98%., Body mass index is 62.65 kg/m².,   Orthostatic Blood Pressures      Flowsheet Row Most Recent Value   Blood Pressure 147/69 filed at 07/05/2024 1500   Patient Position - Orthostatic VS Sitting filed at 07/05/2024 0600              Intake/Output Summary (Last 24 hours) at 7/5/2024 1528  Last data filed at 7/4/2024 2305  Gross per 24 hour   Intake 700 ml   Output --   Net 700 ml       Invasive Devices       Peripheral Intravenous Line  Duration             Peripheral IV 07/04/24 Dorsal (posterior);Left Forearm <1 day    Peripheral IV 07/04/24 Right;Ventral (anterior) Forearm <1 day              Hemodialysis Catheter  Duration             HD Permanent Double Catheter 20 days                    Physical Exam:  Physical Exam  Vitals and nursing note reviewed.   Constitutional:       General: He " is not in acute distress.     Appearance: He is obese. He is not diaphoretic.   HENT:      Head: Normocephalic and atraumatic.   Eyes:      General: No scleral icterus.  Cardiovascular:      Rate and Rhythm: Normal rate and regular rhythm.      Pulses: Normal pulses.      Heart sounds: Normal heart sounds.   Pulmonary:      Effort: Pulmonary effort is normal.      Breath sounds: Normal breath sounds. No wheezing or rales.   Abdominal:      Palpations: Abdomen is soft.      Tenderness: There is no abdominal tenderness.   Musculoskeletal:         General: Swelling present.      Right lower leg: Edema present.      Left lower leg: Edema present.      Comments: LLE > RLE, hx of lymphedema.    Neurological:      General: No focal deficit present.      Mental Status: He is alert and oriented to person, place, and time.   Psychiatric:         Mood and Affect: Mood normal.         Lab Results:   Recent Results (from the past 24 hour(s))   CBC and differential    Collection Time: 07/04/24  3:38 PM   Result Value Ref Range    WBC 6.92 4.31 - 10.16 Thousand/uL    RBC 2.27 (L) 3.88 - 5.62 Million/uL    Hemoglobin 6.6 (L) 12.0 - 17.0 g/dL    Hematocrit 21.2 (L) 36.5 - 49.3 %    MCV 93 82 - 98 fL    MCH 29.1 26.8 - 34.3 pg    MCHC 31.1 (L) 31.4 - 37.4 g/dL    RDW 14.3 11.6 - 15.1 %    MPV 8.9 8.9 - 12.7 fL    Platelets 254 149 - 390 Thousands/uL    nRBC 0 /100 WBCs    Segmented % 80 (H) 43 - 75 %    Immature Grans % 0 0 - 2 %    Lymphocytes % 10 (L) 14 - 44 %    Monocytes % 7 4 - 12 %    Eosinophils Relative 2 0 - 6 %    Basophils Relative 1 0 - 1 %    Absolute Neutrophils 5.54 1.85 - 7.62 Thousands/µL    Absolute Immature Grans 0.02 0.00 - 0.20 Thousand/uL    Absolute Lymphocytes 0.68 0.60 - 4.47 Thousands/µL    Absolute Monocytes 0.50 0.17 - 1.22 Thousand/µL    Eosinophils Absolute 0.14 0.00 - 0.61 Thousand/µL    Basophils Absolute 0.04 0.00 - 0.10 Thousands/µL   Comprehensive metabolic panel    Collection Time: 07/04/24  3:38  "PM   Result Value Ref Range    Sodium 135 135 - 147 mmol/L    Potassium 4.5 3.5 - 5.3 mmol/L    Chloride 96 96 - 108 mmol/L    CO2 27 21 - 32 mmol/L    ANION GAP 12 4 - 13 mmol/L    BUN 27 (H) 5 - 25 mg/dL    Creatinine 9.02 (H) 0.60 - 1.30 mg/dL    Glucose 89 65 - 140 mg/dL    Calcium 9.0 8.4 - 10.2 mg/dL    Corrected Calcium 9.7 8.3 - 10.1 mg/dL    AST 15 13 - 39 U/L    ALT <3 (L) 7 - 52 U/L    Alkaline Phosphatase 90 34 - 104 U/L    Total Protein 7.8 6.4 - 8.4 g/dL    Albumin 3.1 (L) 3.5 - 5.0 g/dL    Total Bilirubin 0.41 0.20 - 1.00 mg/dL    eGFR 6 ml/min/1.73sq m   HS Troponin 0hr (reflex protocol)    Collection Time: 07/04/24  3:38 PM   Result Value Ref Range    hs TnI 0hr 31 \"Refer to ACS Flowchart\"- see link ng/L   B-Type Natriuretic Peptide(BNP)    Collection Time: 07/04/24  3:38 PM   Result Value Ref Range     (H) 0 - 100 pg/mL   D-Dimer    Collection Time: 07/04/24  3:38 PM   Result Value Ref Range    D-Dimer, Quant 4.21 (H) <0.50 ug/ml FEU   Magnesium    Collection Time: 07/04/24  3:38 PM   Result Value Ref Range    Magnesium 1.9 1.9 - 2.7 mg/dL   Type and screen    Collection Time: 07/04/24  4:38 PM   Result Value Ref Range    ABO Grouping A     Rh Factor Positive     Antibody Screen Negative     Specimen Expiration Date [id number]    HS Troponin I 2hr    Collection Time: 07/04/24  5:27 PM   Result Value Ref Range    hs TnI 2hr 35 \"Refer to ACS Flowchart\"- see link ng/L    Delta 2hr hsTnI 4 <20 ng/L   HS Troponin I 4hr    Collection Time: 07/04/24  7:40 PM   Result Value Ref Range    hs TnI 4hr 36 \"Refer to ACS Flowchart\"- see link ng/L    Delta 4hr hsTnI 5 <20 ng/L   FLU/RSV/COVID - if FLU/RSV clinically relevant    Collection Time: 07/04/24  8:13 PM    Specimen: Nose; Nares   Result Value Ref Range    SARS-CoV-2 Negative Negative    INFLUENZA A PCR Negative Negative    INFLUENZA B PCR Negative Negative    RSV PCR Negative Negative   APTT    Collection Time: 07/04/24  8:13 PM   Result Value Ref " "Range    PTT 32 23 - 37 seconds   Protime-INR    Collection Time: 07/04/24  8:13 PM   Result Value Ref Range    Protime 15.7 (H) 11.6 - 14.5 seconds    INR 1.18 0.84 - 1.19   HS Troponin 0hr (reflex protocol)    Collection Time: 07/05/24 12:25 AM   Result Value Ref Range    hs TnI 0hr 32 \"Refer to ACS Flowchart\"- see link ng/L   Fingerstick Glucose (POCT)    Collection Time: 07/05/24 12:27 AM   Result Value Ref Range    POC Glucose 100 65 - 140 mg/dl   ECG 12 lead    Collection Time: 07/05/24 12:44 AM   Result Value Ref Range    Ventricular Rate 53 BPM    Atrial Rate 53 BPM    AK Interval 172 ms    QRSD Interval 88 ms    QT Interval 468 ms    QTC Interval 439 ms    P Ravalli 74 degrees    QRS Axis -2 degrees    T Wave Axis 68 degrees   APTT    Collection Time: 07/05/24  2:42 AM   Result Value Ref Range    PTT 54 (H) 23 - 37 seconds   HS Troponin I 2hr    Collection Time: 07/05/24  3:48 AM   Result Value Ref Range    hs TnI 2hr 27 \"Refer to ACS Flowchart\"- see link ng/L    Delta 2hr hsTnI -5 <20 ng/L   Basic metabolic panel    Collection Time: 07/05/24  4:11 AM   Result Value Ref Range    Sodium 137 135 - 147 mmol/L    Potassium 4.5 3.5 - 5.3 mmol/L    Chloride 97 96 - 108 mmol/L    CO2 31 21 - 32 mmol/L    ANION GAP 9 4 - 13 mmol/L    BUN 32 (H) 5 - 25 mg/dL    Creatinine 9.93 (H) 0.60 - 1.30 mg/dL    Glucose 80 65 - 140 mg/dL    Calcium 8.8 8.4 - 10.2 mg/dL    eGFR 5 ml/min/1.73sq m   CBC (With Platelets)    Collection Time: 07/05/24  4:11 AM   Result Value Ref Range    WBC 6.72 4.31 - 10.16 Thousand/uL    RBC 2.64 (L) 3.88 - 5.62 Million/uL    Hemoglobin 7.6 (L) 12.0 - 17.0 g/dL    Hematocrit 24.4 (L) 36.5 - 49.3 %    MCV 92 82 - 98 fL    MCH 28.8 26.8 - 34.3 pg    MCHC 31.1 (L) 31.4 - 37.4 g/dL    RDW 14.7 11.6 - 15.1 %    Platelets 229 149 - 390 Thousands/uL    MPV 9.0 8.9 - 12.7 fL   Prepare Leukoreduced RBC: 2 Units    Collection Time: 07/05/24  5:47 AM   Result Value Ref Range    Unit Product Code O7277U96     " Unit Number [id number]     Unit ABO A     Unit RH POS     Crossmatch Compatible     Unit Dispense Status Presumed Trans     Unit Product Volume 350 ml    Unit Product Code I1632K78     Unit Number [id number]     Unit ABO A     Unit RH POS     Crossmatch Compatible     Unit Dispense Status Presumed Trans     Unit Product Volume 350 ml   ECG 12 lead    Collection Time: 07/05/24  8:41 AM   Result Value Ref Range    Ventricular Rate 64 BPM    Atrial Rate 64 BPM    MO Interval 164 ms    QRSD Interval 86 ms    QT Interval 450 ms    QTC Interval 464 ms    P Axis 66 degrees    QRS Axis -6 degrees    T Wave Axis 76 degrees   APTT    Collection Time: 07/05/24  9:55 AM   Result Value Ref Range    PTT 43 (H) 23 - 37 seconds   Echo follow up/limited w/ contrast if indicated    Collection Time: 07/05/24 12:06 PM   Result Value Ref Range    BSA 2.71 m2    A4C EF 65 %    LVIDd 5.70 cm    LVIDS 3.90 cm    IVSd 1.30 cm    LVPWd 1.30 cm    FS 32 28 - 44    MV E' Tissue Velocity Septal 10 cm/s    MV E' Tissue Velocity Lateral 14 cm/s    E/A ratio 1.14     E wave deceleration time 178 ms    MV Peak E Flavio 113 cm/s    MV Peak A Flavio 0.99 m/s    RVID d 4.1 cm    Tricuspid annular plane systolic excursion 3.40 cm    LA size 4.9 cm    Aortic valve peak velocity 1.83 m/s    Ao VTI 46.46 cm    AV mean gradient 7 mmHg    AV peak gradient 13 mmHg    MV mean gradient antegrade 2 mmHg    MV peak gradient antegrade 8 mmHg    MV VTI 49.34 cm    MV stenosis pressure 1/2 time 52 ms    MV valve area p 1/2 method 4.23     TR Peak Flavio 2.2 m/s    Triscuspid Valve Regurgitation Peak Gradient 19.0 mmHg    Ao root 2.80 cm    Aortic valve mean velocity 12.40 m/s    Tricuspid valve peak regurgitation velocity 2.17 m/s    Left ventricular stroke volume (2D) 92.00 mL    IVS 1.3 cm    LEFT VENTRICLE SYSTOLIC VOLUME (MOD BIPLANE) 2D 67 mL    LV DIASTOLIC VOLUME (MOD BIPLANE) 2D 159 mL    LVSV, 2D 92 mL    Est. RA pres 10.0 mmHg    Right Ventricular Peak  Systolic Pressure 29.00 mmHg     Imaging: I have personally reviewed pertinent reports.   and I have personally reviewed pertinent films in PACS  Code Status: Level 1 full code  Epic/ Allscripts/Care Everywhere records reviewed: Yes    * Please Note: Fluency DirectDictation voice to text software may have been used in the creation of this document. **

## 2024-07-05 NOTE — ASSESSMENT & PLAN NOTE
Patient has history of anemia of chronic disease, ESRD  CBC on admission showed hgb 6.6/ hct 21.2  Received 2 units packed RBC's in ED  No active bleeding, on heparin for PE  F/u hemoglobin levels

## 2024-07-05 NOTE — PLAN OF CARE
Target UF Goal  3.5  L as tolerated. Patient dialyzing for 3 hours on 2 K bath for serum K of  4.5  per protocol. Treatment plan reviewed with Dr. Spear.   Problem: METABOLIC, FLUID AND ELECTROLYTES - ADULT  Goal: Electrolytes maintained within normal limits  Description: INTERVENTIONS:  - Monitor labs and assess patient for signs and symptoms of electrolyte imbalances  - Administer electrolyte replacement as ordered  - Monitor response to electrolyte replacements, including repeat lab results as appropriate  - Instruct patient on fluid and nutrition as appropriate  Outcome: Progressing  Goal: Fluid balance maintained  Description: INTERVENTIONS:  - Monitor labs   - Monitor I/O and WT  - Instruct patient on fluid and nutrition as appropriate  - Assess for signs & symptoms of volume excess or deficit  Outcome: Progressing   Post-Dialysis RN Treatment Note    Blood Pressure:  Pre 147/69 mm/Hg  Post 146/67 mmHg   EDW  181 kg    Weight:  Pre 184.8 kg   Post 181 kg   Mode of weight measurement: Bed Scale   Volume Removed  3500 ml    Treatment duration 180 minutes    NS given  No    Treatment shortened? Yes, describe: due to late start and Dr. Spear agreed on it   Medications given during Rx None Reported   Estimated Kt/V  None Reported   Access type: Permacath/TDC   Access Issues: No    Report called to primary nurse   Yes Amelie Dickson RN

## 2024-07-05 NOTE — ASSESSMENT & PLAN NOTE
Patient has history of anemia of chronic disease, ESRD  CBC on admission showed hgb 6.6/ hct 21.2  Received 2 units packed RBC's in ED  Hbg 7.6 today after transfusion  Hematology following with outpatient f/u scheduled 7/11/2024.   On oral iron twice daily  No active bleeding, on heparin for PE, will switch to Eliquis eventually.    Plan:  Continue to monitor hemoglobin levels  transfuse for Hgb <7.0

## 2024-07-05 NOTE — ASSESSMENT & PLAN NOTE
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 C 3,4 and K/L ration normal.  SPEP/UEPEP +IgG Kappa monoclonal band, bet-2 microglobulin elevated  Bone marrow biopsy 6/12/2024: negative for plasma cell neoplasm. given negative bone marrow biopsy, renal biopsy would be beneficial but precluded due to acute PE and need for uninterrupted anticoagulation therapy.  CRRT initiated 6/7/2024.  Peak creatinine 10.4  last treatment 7/3/2024 with UF 2.2L  EDW: 181 kg  electrolytes and acid/base stable    Plan:  Nephrology consulted, recommendations appreciated   Tentatively plan for a IR guided renal biopsy  HD today  Next treatment 7/8/2024.  Will plan to continue regular Monday, Wednesday, Friday schedule during hospitalization.

## 2024-07-05 NOTE — ASSESSMENT & PLAN NOTE
Patient O2 saturation on admission was 83% on RA  Was placed on 3/L Nasal canula FiO2 32%  CTA showed right lower lobe subsegmental PE  Was placed on heparin  Consult nephrology for ESRD  Incentive Spirometry

## 2024-07-05 NOTE — ED NOTES
Pt still receiving dialysis and per dialysis nurse pt has about 2 more hours of dialysis treatment.      Amelie Parish RN  07/05/24 0650

## 2024-07-05 NOTE — ASSESSMENT & PLAN NOTE
Patient O2 saturation on admission was 83% on RA  Was placed on 3/L Nasal canula FiO2 32%  CTA showed right lower lobe subsegmental PE  Was placed on heparin  Patient continues to require 4L nasal cannula.    Plan:  Supplemental oxygen as needed  Continue heparin for PE  Incentive Spirometry

## 2024-07-05 NOTE — PROGRESS NOTES
Critical access hospital  Progress Note  Name: Joaquin Frankel I  MRN: [id number]  Unit/Bed#: ED-31 I Date of Admission: 7/4/2024   Date of Service: 7/5/2024 I Hospital Day: 1    Assessment & Plan   * Chest pain  Assessment & Plan  Patient reports worsening substernal chest pain over several weeks.  H/o chronic angina, CAD, family history of hereditary heart disease on fathers side.   Patient was admitted for similar chest pain in early June and was found to have ESRD, started on HD MWF.  EKG on admission: Sinus Rhythm with PAC's  Troponin's negative x3  BNP (990)  D-Dimer 4.21  CTA subsegmental right lower lobe pulmonary embolism  Repeat EKG showed sinus bradycardia  Patient blood pressure elevated on admission. 194/80   Patient was saturating low 80s on room air on admission and required 3L supplemental O2, which improved saturation to 90s.  Patient received 2 doses of nitroglycerin in ED and reports it did not improve his pain, but it improved his elevated blood pressure.      Plan-  Per cardiology recs:   Continue amlodipine 5 mg daily  D/C metoprolol succinate   Start Coreg 6.25 mg BID  Continue Imdur 60 mg daily  Continue aspirin 81 mg daily.  Continue atorvastatin 80 mg daily  Monitor on telemetry      Pulmonary embolism (HCC)  Assessment & Plan  Patient admitted to the ED for worsening chest pain, shortness of breath, and dyspnea on exertion.  Patient was admitted for similar chest pain in early June and was found to have ESRD, started on HD MWF   D-Dimer 4.21  CTA showed subsegmental right lower lobe pulmonary embolism  Placed on heparin drip  Patient requiring 2-4 L nasal cannula.  Echo:  Left Ventricle: Left ventricular cavity size is normal. There is mild concentric hypertrophy. The left ventricular ejection fraction is %. Systolic function is normal. Wall motion is normal. Diastolic function is normal.  Right Ventricle: Right ventricular cavity size is normal. Systolic function is  normal.  Tricuspid Valve: There is trace regurgitation.  Prior TTE study available for comparison. Prior study date: 6/4/2024. No significant changes noted compared to the prior study.    Plan-  Continue Heparin drip  Hold on starting Eliquis or oral AC as nephro is considering a renal biopsy  Continue supplemental oxygen as needed  Monitor telemetry    Anemia  Assessment & Plan  Patient has history of anemia of chronic disease, ESRD  CBC on admission showed hgb 6.6/ hct 21.2  Received 2 units packed RBC's in ED  Hbg 7.6 today after transfusion  Hematology following with outpatient f/u scheduled 7/11/2024.   On oral iron twice daily  No active bleeding, on heparin for PE, will switch to Eliquis eventually.    Plan:  Continue to monitor hemoglobin levels  transfuse for Hgb <7.0    ESRD (end stage renal disease) on dialysis ([facility])  Assessment & Plan  Lab Results   Component Value Date    EGFR 5 07/05/2024    EGFR 6 07/04/2024    EGFR 5 06/26/2024    CREATININE 9.93 (H) 07/05/2024    CREATININE 9.02 (H) 07/04/2024    CREATININE 10.53 (H) 06/26/2024     Patient ESRD on Dialysis 3 times a week, MWF  Elevated , c/o of orthopnea   Etiology of CLOVIS: suspected MGRS  Etiology of CKD: Suspected due to diabetic nephropathy, hypertensive nephrosclerosis, obesity related renal dysfunction  Previous baseline creatinine 1.3-1.6 w/ prior episodes of CLOVIS  serologies: ANCA, anti GBM, JOSE ROBERTO, dsDNA negative. C 3,4 and K/L ration normal.  SPEP/UEPEP +IgG Kappa monoclonal band, bet-2 microglobulin elevated  Bone marrow biopsy 6/12/2024: negative for plasma cell neoplasm. given negative bone marrow biopsy, renal biopsy would be beneficial but precluded due to acute PE and need for uninterrupted anticoagulation therapy.  CRRT initiated 6/7/2024.  Peak creatinine 10.4  last treatment 7/3/2024 with UF 2.2L  EDW: 181 kg  electrolytes and acid/base stable    Plan:  Nephrology consulted, recommendations appreciated   Tentatively plan for a IR  guided renal biopsy  HD today  Next treatment 7/8/2024.  Will plan to continue regular Monday, Wednesday, Friday schedule during hospitalization.      Acute respiratory failure with hypoxia (HCC)  Assessment & Plan  Patient O2 saturation on admission was 83% on RA  Was placed on 3/L Nasal canula FiO2 32%  CTA showed right lower lobe subsegmental PE  Was placed on heparin  Patient continues to require 4L nasal cannula.    Plan:  Supplemental oxygen as needed  Continue heparin for PE  Incentive Spirometry    Chronic acquired lymphedema  Assessment & Plan  Chronic LLE lymphedema x 3 years after surgical resection of LLE cyst    Plan:  Continue compression and elevation of lower extremity for symptomatic edema relief    Hypertension  Assessment & Plan  History of HTN, recent medication change after dx of ESRD  Workup at that time notable for IgG kappa monoclonal gammopathy and s/p bone marrow biopsy on 6/12/2023 to assess for multiple myeloma. Bone marrow biopsy with no evidence of plasma cell neoplasm.   HD on MWF schedule  Presented to ED with uncontrolled /80  Endorsed headaches, and blurry vision in his left eye in the ED that has since resolved.    Plan:  Continue Toprol XL 25mg daily, Torsemide 40mg daily, Imdur 60mg once daily  Start p.o. amlodipine 5 mg daily  Hold Toprol if HR <50  Nephrology consulted, recommendations appreciated  Continue UF with dialysis as BP tolerates              VTE Pharmacologic Prophylaxis: VTE Score: 7 High Risk (Score >/= 5) - Pharmacological DVT Prophylaxis Ordered: heparin. Sequential Compression Devices Ordered.    Mobility:      JH-HLM Goal NOT achieved. Continue with multidisciplinary rounding and encourage appropriate mobility to improve upon JH-HLM goals.    Patient Centered Rounds: I performed bedside rounds with nursing staff today.   Discussions with Specialists or Other Care Team Provider: nephrology and cardiology    Education and Discussions with Family / Patient:  Patient declined call to .     Current Length of Stay: 1 day(s)  Current Patient Status: Inpatient   Discharge Plan: Anticipate discharge in 24-48 hrs to home.    Code Status: Level 1 - Full Code    Subjective:   Patient was seen and evaluated at bedside this morning. Patient reports continued substernal chest pain at rest and with exertion. Patient expresses he is feeling very anxious about his ongoing medical complications.    Objective:     Vitals:   Temp (24hrs), Av °F (36.7 °C), Min:97.6 °F (36.4 °C), Max:98.8 °F (37.1 °C)    Temp:  [97.6 °F (36.4 °C)-98.8 °F (37.1 °C)] 97.6 °F (36.4 °C)  HR:  [46-76] 49  Resp:  [16-22] 18  BP: (133-206)/(54-95) 133/63  SpO2:  [83 %-98 %] 94 %  Body mass index is 62.65 kg/m².     Input and Output Summary (last 24 hours):     Intake/Output Summary (Last 24 hours) at 2024 1712  Last data filed at 2024 1500  Gross per 24 hour   Intake 900 ml   Output --   Net 900 ml       Physical Exam:   Physical Exam  Constitutional:       Appearance: Normal appearance. He is obese. He is not ill-appearing or toxic-appearing.   HENT:      Head: Normocephalic and atraumatic.      Nose: Nose normal.      Mouth/Throat:      Mouth: Mucous membranes are moist.      Pharynx: Oropharynx is clear.   Eyes:      Extraocular Movements: Extraocular movements intact.      Conjunctiva/sclera: Conjunctivae normal.   Cardiovascular:      Rate and Rhythm: Regular rhythm. Bradycardia present.      Heart sounds: Normal heart sounds.   Pulmonary:      Effort: Pulmonary effort is normal.      Breath sounds: Normal breath sounds. No wheezing.   Abdominal:      General: Bowel sounds are normal.      Palpations: Abdomen is soft.      Tenderness: There is no abdominal tenderness.      Comments: Obese abdomen   Musculoskeletal:      Cervical back: Neck supple.      Right lower leg: Edema (violaceous skin changes right lower extremity, punctate wounds healing over right shin) present.      Left  lower leg: Edema (chronic changes, lipodermatosclerosis) present.      Comments: Elderly significantly larger than RLE due to chronic lymphedema.  Old healed surgical incision noted to left calf    Skin:     General: Skin is warm and dry.      Comments: R IJV PermCath site clear.  Dressing intact   Neurological:      General: No focal deficit present.      Mental Status: He is alert and oriented to person, place, and time. Mental status is at baseline.   Psychiatric:         Mood and Affect: Mood normal.          Additional Data:     Labs:  Results from last 7 days   Lab Units 07/05/24  0411 07/04/24  1538   WBC Thousand/uL 6.72 6.92   HEMOGLOBIN g/dL 7.6* 6.6*   HEMATOCRIT % 24.4* 21.2*   PLATELETS Thousands/uL 229 254   SEGS PCT %  --  80*   LYMPHO PCT %  --  10*   MONO PCT %  --  7   EOS PCT %  --  2     Results from last 7 days   Lab Units 07/05/24  0411 07/04/24  1538   SODIUM mmol/L 137 135   POTASSIUM mmol/L 4.5 4.5   CHLORIDE mmol/L 97 96   CO2 mmol/L 31 27   BUN mg/dL 32* 27*   CREATININE mg/dL 9.93* 9.02*   ANION GAP mmol/L 9 12   CALCIUM mg/dL 8.8 9.0   ALBUMIN g/dL  --  3.1*   TOTAL BILIRUBIN mg/dL  --  0.41   ALK PHOS U/L  --  90   ALT U/L  --  <3*   AST U/L  --  15   GLUCOSE RANDOM mg/dL 80 89     Results from last 7 days   Lab Units 07/04/24 2013   INR  1.18     Results from last 7 days   Lab Units 07/05/24  0027   POC GLUCOSE mg/dl 100               Lines/Drains:  Invasive Devices       Peripheral Intravenous Line  Duration             Peripheral IV 07/04/24 Right;Ventral (anterior) Forearm 1 day    Peripheral IV 07/04/24 Dorsal (posterior);Left Forearm <1 day              Hemodialysis Catheter  Duration             HD Permanent Double Catheter 21 days                      Telemetry:  Telemetry Orders (From admission, onward)               24 Hour Telemetry Monitoring  Continuous x 24 Hours (Telem)        Question:  Reason for 24 Hour Telemetry  Answer:  Pulmonary Embolism                      Telemetry Reviewed: Normal Sinus Rhythm and Sinus Bradycardia Indication for Continued Telemetry Use: PE             Imaging: Reviewed radiology reports from this admission including: chest xray, abdominal/pelvic CT, and ECHO    Recent Cultures (last 7 days):         Last 24 Hours Medication List:   Current Facility-Administered Medications   Medication Dose Route Frequency Provider Last Rate    acetaminophen  650 mg Oral Q6H PRN Mily Martin MD      amLODIPine  5 mg Oral Daily Deya Palomino MD      aspirin  81 mg Oral Daily Mily Martin MD      atorvastatin  80 mg Oral Daily Mily Martin MD      carvedilol  6.25 mg Oral BID With Meals PETRA Castillo      ferrous sulfate  325 mg Oral BID With Meals Mily Martin MD      heparin (porcine)  3-30 Units/kg/hr (Order-Specific) Intravenous Titrated Mily Martin MD 24 Units/kg/hr (07/05/24 1708)    heparin (porcine)  10,000 Units Intravenous Q6H PRN Monique Rothman DO      heparin (porcine)  5,000 Units Intravenous Q6H PRN Monique Rothman DO      isosorbide mononitrate  60 mg Oral Daily Mily Martin MD      labetalol  10 mg Intravenous Q6H PRN Liana Riley MD      oxyCODONE  10 mg Oral Q4H PRN Mily Martin MD      sevelamer  800 mg Oral TID With Meals Mily Martin MD      torsemide  40 mg Oral Daily Mily Martin MD          Today, Patient Was Seen By: Deya Palomino MD    **Please Note: This note may have been constructed using a voice recognition system.**

## 2024-07-05 NOTE — H&P
[facility]  H&P  Name: Joaquin Frankel 44 y.o. male I MRN: [id number]  Unit/Bed#: ED-31 I Date of Admission: 7/4/2024   Date of Service: 7/5/2024 I Hospital Day: 1      Assessment & Plan   * Chest pain  Assessment & Plan  H/o chronic angina, CAD, family history of hereditary heart disease on fathers side.  EKG on admission: Sinus Rhythm with PAC's  Troponin's negative x3  BNP (990)  D-Dimer 4.21  CTA subsegmental right lower lobe pulmonary embolism  Repeat EKG showed sinus bradycardia    Monitor on telemetry  F/u echocardiogram  Nitroglycerin ointment  Follow HS Troponin      Anemia  Assessment & Plan  Patient has history of anemia of chronic disease, ESRD  CBC on admission showed hgb 6.6/ hct 21.2  Received 2 units packed RBC's in ED  No active bleeding, on heparin for PE  F/u hemoglobin levels      ESRD (end stage renal disease) on dialysis (HCC)  Assessment & Plan  Lab Results   Component Value Date    EGFR 6 07/04/2024    EGFR 5 06/26/2024    EGFR 4 06/25/2024    CREATININE 9.02 (H) 07/04/2024    CREATININE 10.53 (H) 06/26/2024    CREATININE 12.46 (H) 06/25/2024     Patient ESRD on Dialysis 3 times a week  Elevated , c/o of orthopnea   Will consult nephrology    Pulmonary embolism (HCC)  Assessment & Plan  D-Dimer 4.21  CTA showed subsegmental right lower lobe pulmonary embolism  Placed on telemetry  F/u echocardiogram    Acute respiratory failure with hypoxia (HCC)  Assessment & Plan  Patient O2 saturation on admission was 83% on RA  Was placed on 3/L Nasal canula FiO2 32%  CTA showed right lower lobe subsegmental PE  Was placed on heparin  Consult nephrology for ESRD  Incentive Spirometry    Chronic acquired lymphedema  Assessment & Plan  Patient developed LLE lymphedema after left lower extremity surgical procedure several years ago.       Hypertension  Assessment & Plan  History of HTN, recent medication change after dx of ESRD  Presented to ED with uncontrolled BP  194/80  Endorsed headaches, and blurry vision in his left eye.    Continue Toprol XL 25mg daily, Torsemide 20mg BID, Imdur 60mg once daily  Hold Toprol if HR <50             VTE Pharmacologic Prophylaxis: VTE Score: 7 High Risk (Score >/= 5) - Pharmacological DVT Prophylaxis Ordered: heparin. Sequential Compression Devices Ordered.  Code Status: Level 1 - Full Code   Discussion with family:     Anticipated Length of Stay: Patient will be admitted on an observation basis with an anticipated length of stay of less than 2 midnights secondary to chest pain.    Chief Complaint: chest pain    History of Present Illness:  Joaquin Frankel is a 44 y.o. male with a PMH of ESRD on HD, CAD s/p stenting/PCI, HTN, TIIDM who presents with c/o of chest pain. Patient came in to the hospital with complaints of chest pain. Patient states his chest pain has been present for weeks and but has progressively been getting worse especially over the past 3 days. He states the this pain is exacerbated on exertion, but also occurs at rest and is associated with dyspnea. He endorses a history of chronic angina, but he notes a worsening tolerance to activity and states he has had increased chest pain and dyspnea during the past week while performing daily activities at his house. He contrasts that with a few months ago when he was able to walk 200 yards at his job without any issues.  He describes the pain as a squeezing pain on the left side of his chest rates it as a 10/10 on pain scale. He reports radiation to the left side of his neck and left shoulder. He reports that activity seems to exacerbate his pain, and reports mild relief of his pain when given nitroglycerin at the ED. Patient also reports shortness of breath while laying down at night and has had to sleep upright to prevent himself from gasping for air while laying down. Blood pressure on admission was markedly elevated and patient reports an elevated reading of his blood  "pressure yesterday at his nephrologist office which was >190 systolic. He endorse a history of headaches associated with his chest pain. He also endorses an episode of blurry vision in his left eye today while in the emergency department. He endorses 2 episodes of vomiting today, in the morning and at noon. Patient also has swelling of bilateral lower extremities and marked lymphedema of the left leg that began about 4 years ago due to an infectious cause, he received surgery for removal of cyst on the left side of his groin. He reports 20lbs weight loss since beginning his dialysis treatment last month. Also, of note patient states he has history of hereditary cardiac disease from his father, and states that his father had more then 10 stents and has extensive history of heart problems.    Review of Systems:  Review of Systems   Constitutional:  Negative for fever.   Eyes:  Positive for visual disturbance.   Respiratory:  Positive for shortness of breath. Negative for cough.    Cardiovascular:  Positive for chest pain, palpitations and leg swelling.   Gastrointestinal:  Positive for constipation, nausea and vomiting. Negative for diarrhea.   Musculoskeletal:  Positive for neck pain.   Neurological:  Positive for headaches.   Psychiatric/Behavioral:  The patient is nervous/anxious.        Past Medical and Surgical History:   Past Medical History:   Diagnosis Date    Acute hypoxic respiratory failure ([facility]) 10/07/2023    Arthritis     Breathing difficulty     Cellulitis 10/07/2023    Coronary artery disease     Diabetes mellitus ([facility])     Diabetic neuropathy ([facility]) 05/28/2021    Heart disease     CAD   s/p ptca with 1 stent 2012    Hidradenitis suppurativa     groin    History of transfusion     Hyperlipidemia     Hypertension     MI (myocardial infarction) ([facility])     \" silent \" M.I. in the past    Morbid obesity with BMI of 50.0-59.9, adult ([facility])     Nicotine dependence     Obesity     Pilonidal cyst     Type 1 non-ST " elevation myocardial infarction (NSTEMI) ([facility]) 11/29/2020       Past Surgical History:   Procedure Laterality Date    CARDIAC SURGERY      CORONARY ANGIOPLASTY WITH STENT PLACEMENT      INCISION AND DRAINAGE OF WOUND N/A 1/24/2017    Procedure: INCISION AND DRAINAGE (I&D) BUTTOCK, PILONIDAL CYST;  Surgeon: Simba Adhikari MD;  Location: [facility];  Service:     IR BIOPSY BONE MARROW  6/12/2024    IR TEMPORARY DIALYSIS CATHETER PLACEMENT  6/6/2024    IR TUNNELED CENTRAL LINE PLACEMENT  6/14/2024    LEG SURGERY Left     I&D of left leg 2012    OH DEBRIDEMENT SUBCUTANEOUS TISSUE 1ST 20 SQ CM/< Left 7/6/2021    Procedure: DEBRIDEMENT WOUND (WASH OUT);  Surgeon: Sudheer Mcfarlane MD;  Location: [facility];  Service: General    OH EXC B9 LESION MRGN XCP SK TG T/A/L >4.0 CM Left 4/6/2021    Procedure: EXCISION THIGH MASS;  Surgeon: Sudheer Mcfarlane MD;  [facility]: [facility];  Service: General    OH EXCISION H/P/P/U COMPLEX REPAIR Left 7/6/2021    Procedure: EXCISION PERINEAL ABSCESS LEFT THIGH;  Surgeon: Sudheer Mcfarlane MD;  [facility];  Service: General    OH NEGATIVE PRESSURE WOUND THERAPY DME <= 50 SQ CM Left 4/6/2021    Procedure: APPLICATION VAC DRESSING THIGH;  Surgeon: Sudheer Mcfarlane MD;  [facility];  Service: General    WOUND DEBRIDEMENT Left 4/17/2021    Procedure: DEBRIDEMENT WOUND AND DRESSING CHANGE (WASH OUT);  Surgeon: Mahin Traore DO;  [facility];  Service: General    WOUND DEBRIDEMENT Left 4/22/2021    Procedure: DEBRIDEMENT LOWER EXTREMITY (WASH OUT), left groin and thigh;  Surgeon: Sudheer Mcfarlane MD;  [facility];  Service: General    WOUND DEBRIDEMENT Left 5/26/2021    Procedure: DEBRIDEMENT LOWER EXTREMITY (WASH OUT);  Surgeon: Zak Castanon DO;  [facility];  Service: General    WOUND DEBRIDEMENT Left 5/28/2021    Procedure: Washout left thigh wound and closure;  Surgeon: Amor Jaramillo DO;  [facility];  Service: General       Meds/Allergies:  Prior to  Admission medications    Medication Sig Start Date End Date Taking? Authorizing Provider   aspirin (ECOTRIN LOW STRENGTH) 81 mg EC tablet Take 1 tablet (81 mg total) by mouth daily 12/3/20  Yes Javier Hou MD   atorvastatin (LIPITOR) 80 mg tablet Take 1 tablet (80 mg total) by mouth daily 2/28/24  Yes Tuesday PETRA Villalobos   Cholecalciferol (VITAMIN D3) 1,000 units tablet Take 2 tablets (2,000 Units total) by mouth daily 6/27/24 7/27/24 Yes Joanne Anguiano MD   cyanocobalamin (VITAMIN B-12) 1000 MCG tablet Take 1 tablet (1,000 mcg total) by mouth daily 6/27/24  Yes Joanne Anguiano MD   ferrous sulfate 324 (65 Fe) mg Take 1 tablet (324 mg total) by mouth 2 (two) times a day before meals 6/26/24  Yes Joanne Anguiano MD   isosorbide mononitrate (IMDUR) 60 mg 24 hr tablet Take 1 tablet (60 mg total) by mouth daily 6/27/24  Yes Joanne Anguiano MD   metoprolol succinate (TOPROL-XL) 25 mg 24 hr tablet Take 1 tablet (25 mg total) by mouth daily 6/27/24  Yes Joanne Anguiano MD   oxyCODONE (ROXICODONE) 10 MG TABS Take 1 tablet (10 mg total) by mouth every 4 (four) hours as needed for moderate pain or severe pain Max Daily Amount: 60 mg 6/26/24  Yes Joanne Anguiano MD   sevelamer (RENAGEL) 800 mg tablet Take 1 tablet (800 mg total) by mouth 3 (three) times a day with meals 6/26/24  Yes Joanne Anguiano MD   torsemide (DEMADEX) 20 mg tablet Take 2 tablets (40 mg total) by mouth daily 6/27/24  Yes Joanne Anguiano MD   Ascorbic Acid (vitamin C) 100 MG tablet Take 1 tablet (100 mg total) by mouth daily 6/26/24   Joanne Anguiano MD   docusate sodium (COLACE) 100 mg capsule Take 1 capsule (100 mg total) by mouth 2 (two) times a day 6/26/24   Joanne Anguiano MD   glimepiride (AMARYL) 2 mg tablet Take 1 tablet (2 mg total) by mouth daily with breakfast 2/28/24 6/3/24  Tuesday PETRA Villalobos   insulin glargine (LANTUS) 100 units/mL subcutaneous injection Inject 12 Units under the skin daily at bedtime Do not start before February 21, 2024.  2/21/24   Lillie Borges,    Insulin Pen Needle (BD ULTRA-FINE PEN NEEDLES) 29G X 12.7MM MISC USE 1 NEEDLE ONCE DAILY FOR INJECTION UNDER THE SKIN 11/1/23   Luc Horne MD   lidocaine (LIDODERM) 5 % Apply 1 patch topically over 12 hours daily Remove & Discard patch within 12 hours or as directed by MD  Patient not taking: Reported on 7/4/2024 6/27/24   Joanne Anguiano MD   nystatin (MYCOSTATIN) powder Apply 1 Application topically 2 (two) times a day  Patient not taking: Reported on 7/4/2024 6/26/24   Joanne Anguiano MD     I have reviewed home medications with patient personally.    Allergies:   Allergies   Allergen Reactions    Keflex [Cephalexin] Facial Swelling and Lip Swelling    Amoxicillin Hives     childhood       Social History:  Marital Status: Single   Occupation: [email or web address] work in [place]  Patient Pre-hospital Living Situation: Home  Patient Pre-hospital Level of Mobility:  is able to walk but becomes short of breath  Patient Pre-hospital Diet Restrictions:   Substance Use History:   Social History     Substance and Sexual Activity   Alcohol Use Not Currently    Comment: rarely     Social History     Tobacco Use   Smoking Status Former    Current packs/day: 0.00    Average packs/day: 1.5 packs/day for 20.0 years (29.9 ttl pk-yrs)    Types: Cigarettes    Start date: 01/2021    Quit date: 03/2021    Years since quitting: 3.3    Passive exposure: Past   Smokeless Tobacco Never     Social History     Substance and Sexual Activity   Drug Use No       Family History:  Family History   Problem Relation Age of Onset    Heart disease Father     Diabetes Father     Hypertension Mother     Heart disease Mother        Physical Exam:     Vitals:   Blood Pressure: 163/71 (07/05/24 0115)  Pulse: (!) 54 (07/05/24 0115)  Temperature: 97.6 °F (36.4 °C) (07/04/24 2300)  Temp Source: Oral (07/04/24 2300)  Respirations: 16 (07/05/24 0115)  SpO2: 95 % (07/05/24 0115)    Physical Exam  Vitals and nursing note reviewed.    Constitutional:       General: He is not in acute distress.     Appearance: He is well-developed.   HENT:      Head: Normocephalic and atraumatic.   Eyes:      Conjunctiva/sclera: Conjunctivae normal.   Cardiovascular:      Rate and Rhythm: Regular rhythm. Bradycardia present.      Heart sounds: No murmur heard.  Pulmonary:      Effort: Pulmonary effort is normal. No respiratory distress.      Breath sounds: Normal breath sounds.   Abdominal:      Palpations: Abdomen is soft.      Tenderness: There is no abdominal tenderness.   Musculoskeletal:         General: Swelling present.      Cervical back: Neck supple.      Right lower leg: Edema (violaceous skin changes right lower extremity, punctate wounds healing over right shin) present.      Left lower leg: Edema (marked chronic lymphedema of left lower extremity, tense nonpitting) present.   Skin:     General: Skin is warm and dry.      Capillary Refill: Capillary refill takes less than 2 seconds.   Neurological:      Mental Status: He is alert.   Psychiatric:         Mood and Affect: Mood normal.          Additional Data:     Lab Results:  Results from last 7 days   Lab Units 07/05/24  0411 07/04/24  1538   WBC Thousand/uL 6.72 6.92   HEMOGLOBIN g/dL 7.6* 6.6*   HEMATOCRIT % 24.4* 21.2*   PLATELETS Thousands/uL 229 254   SEGS PCT %  --  80*   LYMPHO PCT %  --  10*   MONO PCT %  --  7   EOS PCT %  --  2     Results from last 7 days   Lab Units 07/04/24  1538   SODIUM mmol/L 135   POTASSIUM mmol/L 4.5   CHLORIDE mmol/L 96   CO2 mmol/L 27   BUN mg/dL 27*   CREATININE mg/dL 9.02*   ANION GAP mmol/L 12   CALCIUM mg/dL 9.0   ALBUMIN g/dL 3.1*   TOTAL BILIRUBIN mg/dL 0.41   ALK PHOS U/L 90   ALT U/L <3*   AST U/L 15   GLUCOSE RANDOM mg/dL 89     Results from last 7 days   Lab Units 07/04/24  2013   INR  1.18     Results from last 7 days   Lab Units 07/05/24  0027   POC GLUCOSE mg/dl 100     Lab Results   Component Value Date    HGBA1C 5.7 (H) 06/09/2024    HGBA1C 6.6 (H)  04/29/2024    HGBA1C 12.1 (H) 01/26/2024           Lines/Drains:  Invasive Devices       Peripheral Intravenous Line  Duration             Peripheral IV 07/04/24 Dorsal (posterior);Left Forearm <1 day    Peripheral IV 07/04/24 Right;Ventral (anterior) Forearm <1 day              Hemodialysis Catheter  Duration             HD Permanent Double Catheter 20 days                        Imaging: Reviewed radiology reports from this admission including: chest xray and CTA  CTA ED chest PE Study   Final Result by Gurjit Gibbons MD (07/04 2003)      Subsegmental right lower lobe pulmonary embolism.   No CT evidence of right heart strain.      Diffuse patchy groundglass opacities consistent with infectious/inflammatory infiltrate.      Findings were discussed with Dr. Monique Rothman MD 8:00 p.m. on 7/4/2024            Workstation performed: [id number]         XR chest 1 view portable   Final Result by Danyell Ford MD (07/04 1707)      Mild pulmonary venous congestion.            Workstation performed: [id number]             EKG and Other Studies Reviewed on Admission:   EKG: NSR. HR 69.    ** Please Note: This note has been constructed using a voice recognition system. **

## 2024-07-06 PROBLEM — J96.01 ACUTE RESPIRATORY FAILURE WITH HYPOXIA (HCC): Status: RESOLVED | Noted: 2023-10-07 | Resolved: 2024-07-06

## 2024-07-06 LAB
ANION GAP SERPL CALCULATED.3IONS-SCNC: 11 MMOL/L (ref 4–13)
APTT PPP: 65 SECONDS (ref 23–37)
ATRIAL RATE: 53 BPM
ATRIAL RATE: 69 BPM
BASOPHILS # BLD AUTO: 0.05 THOUSANDS/ÂΜL (ref 0–0.1)
BASOPHILS NFR BLD AUTO: 1 % (ref 0–1)
BUN SERPL-MCNC: 33 MG/DL (ref 5–25)
CALCIUM SERPL-MCNC: 8.8 MG/DL (ref 8.4–10.2)
CHLORIDE SERPL-SCNC: 96 MMOL/L (ref 96–108)
CO2 SERPL-SCNC: 29 MMOL/L (ref 21–32)
CREAT SERPL-MCNC: 8.43 MG/DL (ref 0.6–1.3)
EOSINOPHIL # BLD AUTO: 0.46 THOUSAND/ÂΜL (ref 0–0.61)
EOSINOPHIL NFR BLD AUTO: 7 % (ref 0–6)
ERYTHROCYTE [DISTWIDTH] IN BLOOD BY AUTOMATED COUNT: 14.5 % (ref 11.6–15.1)
GFR SERPL CREATININE-BSD FRML MDRD: 6 ML/MIN/1.73SQ M
GLUCOSE SERPL-MCNC: 110 MG/DL (ref 65–140)
GLUCOSE SERPL-MCNC: 115 MG/DL (ref 65–140)
GLUCOSE SERPL-MCNC: 78 MG/DL (ref 65–140)
GLUCOSE SERPL-MCNC: 85 MG/DL (ref 65–140)
GLUCOSE SERPL-MCNC: 95 MG/DL (ref 65–140)
HCT VFR BLD AUTO: 24.6 % (ref 36.5–49.3)
HGB BLD-MCNC: 7.5 G/DL (ref 12–17)
IMM GRANULOCYTES # BLD AUTO: 0.05 THOUSAND/UL (ref 0–0.2)
IMM GRANULOCYTES NFR BLD AUTO: 1 % (ref 0–2)
LYMPHOCYTES # BLD AUTO: 1.1 THOUSANDS/ÂΜL (ref 0.6–4.47)
LYMPHOCYTES NFR BLD AUTO: 16 % (ref 14–44)
MCH RBC QN AUTO: 28.5 PG (ref 26.8–34.3)
MCHC RBC AUTO-ENTMCNC: 30.5 G/DL (ref 31.4–37.4)
MCV RBC AUTO: 94 FL (ref 82–98)
MONOCYTES # BLD AUTO: 0.6 THOUSAND/ÂΜL (ref 0.17–1.22)
MONOCYTES NFR BLD AUTO: 9 % (ref 4–12)
NEUTROPHILS # BLD AUTO: 4.57 THOUSANDS/ÂΜL (ref 1.85–7.62)
NEUTS SEG NFR BLD AUTO: 66 % (ref 43–75)
NRBC BLD AUTO-RTO: 0 /100 WBCS
P AXIS: 28 DEGREES
P AXIS: 74 DEGREES
PHOSPHATE SERPL-MCNC: 4.6 MG/DL (ref 2.7–4.5)
PLATELET # BLD AUTO: 214 THOUSANDS/UL (ref 149–390)
PMV BLD AUTO: 9.2 FL (ref 8.9–12.7)
POTASSIUM SERPL-SCNC: 4.2 MMOL/L (ref 3.5–5.3)
PR INTERVAL: 146 MS
PR INTERVAL: 172 MS
QRS AXIS: -12 DEGREES
QRS AXIS: -2 DEGREES
QRSD INTERVAL: 86 MS
QRSD INTERVAL: 88 MS
QT INTERVAL: 422 MS
QT INTERVAL: 468 MS
QTC INTERVAL: 439 MS
QTC INTERVAL: 452 MS
RBC # BLD AUTO: 2.63 MILLION/UL (ref 3.88–5.62)
SODIUM SERPL-SCNC: 136 MMOL/L (ref 135–147)
T WAVE AXIS: 66 DEGREES
T WAVE AXIS: 68 DEGREES
VENTRICULAR RATE: 53 BPM
VENTRICULAR RATE: 69 BPM
WBC # BLD AUTO: 6.83 THOUSAND/UL (ref 4.31–10.16)

## 2024-07-06 PROCEDURE — 82948 REAGENT STRIP/BLOOD GLUCOSE: CPT

## 2024-07-06 PROCEDURE — 85025 COMPLETE CBC W/AUTO DIFF WBC: CPT

## 2024-07-06 PROCEDURE — 99232 SBSQ HOSP IP/OBS MODERATE 35: CPT | Performed by: INTERNAL MEDICINE

## 2024-07-06 PROCEDURE — 5A1D70Z PERFORMANCE OF URINARY FILTRATION, INTERMITTENT, LESS THAN 6 HOURS PER DAY: ICD-10-PCS | Performed by: INTERNAL MEDICINE

## 2024-07-06 PROCEDURE — NC001 PR NO CHARGE: Performed by: RADIOLOGY

## 2024-07-06 PROCEDURE — 93010 ELECTROCARDIOGRAM REPORT: CPT | Performed by: INTERNAL MEDICINE

## 2024-07-06 PROCEDURE — 84100 ASSAY OF PHOSPHORUS: CPT

## 2024-07-06 PROCEDURE — 80048 BASIC METABOLIC PNL TOTAL CA: CPT

## 2024-07-06 PROCEDURE — 85730 THROMBOPLASTIN TIME PARTIAL: CPT | Performed by: INTERNAL MEDICINE

## 2024-07-06 PROCEDURE — 87081 CULTURE SCREEN ONLY: CPT | Performed by: INTERNAL MEDICINE

## 2024-07-06 RX ORDER — TORSEMIDE 100 MG/1
100 TABLET ORAL DAILY
Status: DISCONTINUED | OUTPATIENT
Start: 2024-07-07 | End: 2024-07-26 | Stop reason: HOSPADM

## 2024-07-06 RX ORDER — AMLODIPINE BESYLATE 10 MG/1
10 TABLET ORAL DAILY
Status: DISCONTINUED | OUTPATIENT
Start: 2024-07-07 | End: 2024-07-22

## 2024-07-06 RX ORDER — CARVEDILOL 6.25 MG/1
6.25 TABLET ORAL 2 TIMES DAILY WITH MEALS
Status: DISCONTINUED | OUTPATIENT
Start: 2024-07-06 | End: 2024-07-26 | Stop reason: HOSPADM

## 2024-07-06 RX ORDER — CARVEDILOL 12.5 MG/1
12.5 TABLET ORAL 2 TIMES DAILY WITH MEALS
Status: DISCONTINUED | OUTPATIENT
Start: 2024-07-06 | End: 2024-07-06

## 2024-07-06 RX ADMIN — TORSEMIDE 40 MG: 20 TABLET ORAL at 08:57

## 2024-07-06 RX ADMIN — HEPARIN SODIUM 24 UNITS/KG/HR: 10000 INJECTION, SOLUTION INTRAVENOUS at 19:20

## 2024-07-06 RX ADMIN — ISOSORBIDE MONONITRATE 60 MG: 60 TABLET, EXTENDED RELEASE ORAL at 08:57

## 2024-07-06 RX ADMIN — SEVELAMER HYDROCHLORIDE 800 MG: 800 TABLET ORAL at 11:08

## 2024-07-06 RX ADMIN — OXYCODONE HYDROCHLORIDE 10 MG: 10 TABLET ORAL at 22:59

## 2024-07-06 RX ADMIN — SEVELAMER HYDROCHLORIDE 800 MG: 800 TABLET ORAL at 08:57

## 2024-07-06 RX ADMIN — ATORVASTATIN CALCIUM 80 MG: 40 TABLET, FILM COATED ORAL at 08:57

## 2024-07-06 RX ADMIN — CARVEDILOL 6.25 MG: 6.25 TABLET, FILM COATED ORAL at 15:45

## 2024-07-06 RX ADMIN — SEVELAMER HYDROCHLORIDE 800 MG: 800 TABLET ORAL at 15:45

## 2024-07-06 RX ADMIN — CARVEDILOL 6.25 MG: 6.25 TABLET, FILM COATED ORAL at 08:59

## 2024-07-06 RX ADMIN — FERROUS SULFATE TAB 325 MG (65 MG ELEMENTAL FE) 325 MG: 325 (65 FE) TAB at 15:45

## 2024-07-06 RX ADMIN — FERROUS SULFATE TAB 325 MG (65 MG ELEMENTAL FE) 325 MG: 325 (65 FE) TAB at 08:57

## 2024-07-06 RX ADMIN — AMLODIPINE BESYLATE 5 MG: 5 TABLET ORAL at 08:57

## 2024-07-06 RX ADMIN — HEPARIN SODIUM 24 UNITS/KG/HR: 10000 INJECTION, SOLUTION INTRAVENOUS at 08:58

## 2024-07-06 RX ADMIN — HEPARIN SODIUM 24 UNITS/KG/HR: 10000 INJECTION, SOLUTION INTRAVENOUS at 00:23

## 2024-07-06 RX ADMIN — ASPIRIN 81 MG: 81 TABLET, COATED ORAL at 08:57

## 2024-07-06 NOTE — PLAN OF CARE
Problem: Potential for Falls  Goal: Patient will remain free of falls  Description: INTERVENTIONS:  - Educate patient/family on patient safety including physical limitations  - Instruct patient to call for assistance with activity   - Consult OT/PT to assist with strengthening/mobility   - Keep Call bell within reach  - Keep bed low and locked with side rails adjusted as appropriate  - Keep care items and personal belongings within reach  - Initiate and maintain comfort rounds  - Make Fall Risk Sign visible to staff  - Offer Toileting every  Hours, in advance of need  - Initiate/Maintain alarm  - Obtain necessary fall risk management equipment:   - Apply yellow socks and bracelet for high fall risk patients  - Consider moving patient to room near nurses station  Outcome: Progressing     Problem: METABOLIC, FLUID AND ELECTROLYTES - ADULT  Goal: Electrolytes maintained within normal limits  Description: INTERVENTIONS:  - Monitor labs and assess patient for signs and symptoms of electrolyte imbalances  - Administer electrolyte replacement as ordered  - Monitor response to electrolyte replacements, including repeat lab results as appropriate  - Instruct patient on fluid and nutrition as appropriate  Outcome: Progressing  Goal: Fluid balance maintained  Description: INTERVENTIONS:  - Monitor labs   - Monitor I/O and WT  - Instruct patient on fluid and nutrition as appropriate  - Assess for signs & symptoms of volume excess or deficit  Outcome: Progressing     Problem: Prexisting or High Potential for Compromised Skin Integrity  Goal: Skin integrity is maintained or improved  Description: INTERVENTIONS:  - Identify patients at risk for skin breakdown  - Assess and monitor skin integrity  - Assess and monitor nutrition and hydration status  - Monitor labs   - Assess for incontinence   - Turn and reposition patient  - Assist with mobility/ambulation  - Relieve pressure over bony prominences  - Avoid friction and  shearing  - Provide appropriate hygiene as needed including keeping skin clean and dry  - Evaluate need for skin moisturizer/barrier cream  - Collaborate with interdisciplinary team   - Patient/family teaching  - Consider wound care consult   Outcome: Progressing

## 2024-07-06 NOTE — ASSESSMENT & PLAN NOTE
Lab Results   Component Value Date    HGBA1C 5.7 (H) 06/09/2024       Recent Labs     07/07/24  1542 07/07/24  2113 07/08/24  0742 07/08/24  1058   POCGLU 102 118 76 99       Blood Sugar Average: Last 72 hrs:  (P) [id number]

## 2024-07-06 NOTE — PROGRESS NOTES
[facility]  Progress Note  Name: Joaquin Frankel I  MRN: [id number]  Unit/Bed#: S -Karon I Date of Admission: 7/4/2024   Date of Service: 7/6/2024 I Hospital Day: 2    Assessment & Plan   * Chest pain  Assessment & Plan  Patient reports worsening substernal chest pain over several weeks.  H/o chronic angina, CAD, family history of hereditary heart disease on fathers side.   Patient was admitted for similar chest pain in early June and was found to have ESRD, started on HD MWF.  EKG on admission: Sinus Rhythm with PAC's  Troponin's negative x3  BNP (990)  D-Dimer 4.21  CTA subsegmental right lower lobe pulmonary embolism  Repeat EKG showed sinus bradycardia  Patient blood pressure elevated on admission. 194/80 currently less than 140s  Patient was saturating low 80s on room air on admission and required 3L supplemental O2, which improved saturation to 90s.  Patient received 2 doses of nitroglycerin in ED and reports it did not improve his pain, but it improved his elevated blood pressure.    Etiology could be secondary to volume overload versus hypertension versus anemia versus ischemia.  Patient's chest pain has improved following hemodialysis and the improvement of high blood pressure.      Plan-  If chest pain persist despite of HD, heparin drip, and normotension patient may need cardiac catheterization at some point.  Per cardiology recs:   Continue amlodipine 5 mg daily  D/C metoprolol succinate   Start Coreg 6.25 mg BID  Continue Imdur 60 mg daily  Continue aspirin 81 mg daily.  Continue atorvastatin 80 mg daily  Monitor on telemetry      ESRD (end stage renal disease) on dialysis (HCC)  Assessment & Plan  Lab Results   Component Value Date    EGFR 6 07/06/2024    EGFR 5 07/05/2024    EGFR 6 07/04/2024    CREATININE 8.43 (H) 07/06/2024    CREATININE 9.93 (H) 07/05/2024    CREATININE 9.02 (H) 07/04/2024     Patient ESRD on Dialysis 3 times a week, MWF  Elevated , c/o of  orthopnea   Etiology of CLOVIS: suspected MGRS  Etiology of CKD: Suspected due to diabetic nephropathy, hypertensive nephrosclerosis, obesity related renal dysfunction  Previous baseline creatinine 1.3-1.6 w/ prior episodes of CLOVIS  serologies: ANCA, anti GBM, JOSE ROBERTO, dsDNA negative. C 3,4 and K/L ration normal.  SPEP/UEPEP +IgG Kappa monoclonal band, bet-2 microglobulin elevated  Bone marrow biopsy 6/12/2024: negative for plasma cell neoplasm. given negative bone marrow biopsy, renal biopsy would be beneficial but precluded due to acute PE and need for uninterrupted anticoagulation therapy.  CRRT initiated 6/7/2024.  Peak creatinine 10.4  last treatment 7/3/2024 with UF 2.2L  EDW: 181 kg  electrolytes and acid/base stable    Plan:  Nephrology consulted, recommendations appreciated   Tentatively plan for a IR guided renal biopsy  Hold aspirin for 5 days prior to biopsy, possible earliest this Thursday. (Patient received aspirin today a.m.)  heparin will need to be held Thursday morning.   Next HD treatment 7/8/2024.  Will plan to continue regular Monday, Wednesday, Friday schedule during hospitalization.      Pulmonary embolism (HCC)  Assessment & Plan  Patient admitted to the ED for worsening chest pain, shortness of breath, and dyspnea on exertion.  Patient was admitted for similar chest pain in early June and was found to have ESRD, started on HD MWF   D-Dimer 4.21  CTA showed subsegmental right lower lobe pulmonary embolism  Placed on heparin drip  Patient requiring 2-4 L nasal cannula.  Echo:  Left Ventricle: Left ventricular cavity size is normal. There is mild concentric hypertrophy. The left ventricular ejection fraction is %. Systolic function is normal. Wall motion is normal. Diastolic function is normal.  Right Ventricle: Right ventricular cavity size is normal. Systolic function is normal.  Tricuspid Valve: There is trace regurgitation.  Prior TTE study available for comparison. Prior study date: 6/4/2024. No  significant changes noted compared to the prior study.    Plan-  Continue Heparin drip  Patient to oral AC following biopsy  Continue supplemental oxygen as needed  Monitor telemetry    Type 2 diabetes mellitus (HCC)  Assessment & Plan  Lab Results   Component Value Date    HGBA1C 5.7 (H) 06/09/2024       Recent Labs     07/05/24  1743 07/05/24  2126 07/06/24  0716 07/06/24  1137   POCGLU 114 106 78 95       Blood Sugar Average: Last 72 hrs:  (P) 98.6      Anemia  Assessment & Plan  Patient has history of anemia of chronic disease, ESRD  CBC on admission showed hgb 6.6/ hct 21.2  Received 2 units packed RBC's in ED  Hbg 7.6 today after transfusion  Hematology following with outpatient f/u scheduled 7/11/2024.   On oral iron twice daily  No active bleeding, on heparin for PE, will switch to Eliquis eventually.    Plan:  Continue to monitor hemoglobin levels  transfuse for Hgb <7.0    Chronic acquired lymphedema  Assessment & Plan  Chronic LLE lymphedema x 3 years after surgical resection of LLE cyst    Plan:  Continue compression and elevation of lower extremity for symptomatic edema relief    Hypertension  Assessment & Plan  History of HTN, recent medication change after dx of ESRD  Workup at that time notable for IgG kappa monoclonal gammopathy and s/p bone marrow biopsy on 6/12/2023 to assess for multiple myeloma. Bone marrow biopsy with no evidence of plasma cell neoplasm.   HD on MWF schedule  Presented to ED with uncontrolled /80  Endorsed headaches, and blurry vision in his left eye in the ED that has since resolved.    Plan:  Continue Toprol XL 25mg daily, Torsemide 40mg daily, Imdur 60mg once daily  Started p.o. amlodipine 5 mg daily  Hold Toprol if HR <50  Nephrology consulted, recommendations appreciated  Continue UF with dialysis as BP tolerates       Acute respiratory failure with hypoxia (HCC)-resolved as of 7/6/2024  Assessment & Plan  Patient O2 saturation on admission was 83% on RA  Was placed  on 3/L Nasal canula FiO2 32%  CTA showed right lower lobe subsegmental PE  Was placed on heparin  Patient continues to require 4L nasal cannula.    Plan:  Supplemental oxygen as needed  Continue heparin for PE  Incentive Spirometry           VTE Pharmacologic Prophylaxis: VTE Score: 7 High Risk (Score >/= 5) - Pharmacological DVT Prophylaxis Ordered: heparin. Sequential Compression Devices Ordered.    Mobility:   Basic Mobility Inpatient Raw Score: 24  JH-HLM Goal: 8: Walk 250 feet or more  JH-HLM Achieved: 7: Walk 25 feet or more  JH-HLM Goal NOT achieved. Continue with multidisciplinary rounding and encourage appropriate mobility to improve upon JH-HLM goals.    Patient Centered Rounds: I performed bedside rounds with nursing staff today.   Discussions with Specialists or Other Care Team Provider: nephrology and cardiology    Education and Discussions with Family / Patient: Patient declined call to .     Current Length of Stay: 2 day(s)  Current Patient Status: Inpatient   Discharge Plan: Anticipate discharge in 24-48 hrs to home.    Code Status: Level 1 - Full Code    Subjective:   Patient was seen and evaluated at bedside this morning.  Patient reported some chest pressure overnight.  Chest pain with exertion has improved compared to admission.  Vital stable.    Objective:     Vitals:   Temp (24hrs), Av.1 °F (36.7 °C), Min:97.6 °F (36.4 °C), Max:98.3 °F (36.8 °C)    Temp:  [97.6 °F (36.4 °C)-98.3 °F (36.8 °C)] 98.2 °F (36.8 °C)  HR:  [46-60] 60  Resp:  [16-18] 16  BP: (114-177)/(58-82) 177/82  SpO2:  [90 %-98 %] 90 %  Body mass index is 59.87 kg/m².     Input and Output Summary (last 24 hours):     Intake/Output Summary (Last 24 hours) at 2024 1317  Last data filed at 2024 1101  Gross per 24 hour   Intake 1760 ml   Output 4300 ml   Net -2540 ml       Physical Exam:   Physical Exam  Constitutional:       Appearance: Normal appearance. He is obese. He is not ill-appearing or  toxic-appearing.   HENT:      Head: Normocephalic and atraumatic.      Nose: Nose normal.      Mouth/Throat:      Mouth: Mucous membranes are moist.      Pharynx: Oropharynx is clear.   Eyes:      Extraocular Movements: Extraocular movements intact.      Conjunctiva/sclera: Conjunctivae normal.   Cardiovascular:      Rate and Rhythm: Regular rhythm. Bradycardia present.      Heart sounds: Normal heart sounds.   Pulmonary:      Effort: Pulmonary effort is normal.      Breath sounds: Normal breath sounds. No wheezing.   Abdominal:      General: Bowel sounds are normal.      Palpations: Abdomen is soft.      Tenderness: There is no abdominal tenderness.      Comments: Obese abdomen   Musculoskeletal:      Cervical back: Neck supple.      Right lower leg: Edema (violaceous skin changes right lower extremity, punctate wounds healing over right shin) present.      Left lower leg: Edema (chronic changes, lipodermatosclerosis) present.      Comments: Elderly significantly larger than RLE due to chronic lymphedema.  Old healed surgical incision noted to left calf    Skin:     General: Skin is warm and dry.      Comments: R IJV PermCath site clear.  Dressing intact   Neurological:      General: No focal deficit present.      Mental Status: He is alert and oriented to person, place, and time. Mental status is at baseline.   Psychiatric:         Mood and Affect: Mood normal.          Additional Data:     Labs:  Results from last 7 days   Lab Units 07/06/24  0456   WBC Thousand/uL 6.83   HEMOGLOBIN g/dL 7.5*   HEMATOCRIT % 24.6*   PLATELETS Thousands/uL 214   SEGS PCT % 66   LYMPHO PCT % 16   MONO PCT % 9   EOS PCT % 7*     Results from last 7 days   Lab Units 07/06/24  0456 07/05/24  0411 07/04/24  1538   SODIUM mmol/L 136   < > 135   POTASSIUM mmol/L 4.2   < > 4.5   CHLORIDE mmol/L 96   < > 96   CO2 mmol/L 29   < > 27   BUN mg/dL 33*   < > 27*   CREATININE mg/dL 8.43*   < > 9.02*   ANION GAP mmol/L 11   < > 12   CALCIUM mg/dL  8.8   < > 9.0   ALBUMIN g/dL  --   --  3.1*   TOTAL BILIRUBIN mg/dL  --   --  0.41   ALK PHOS U/L  --   --  90   ALT U/L  --   --  <3*   AST U/L  --   --  15   GLUCOSE RANDOM mg/dL 85   < > 89    < > = values in this interval not displayed.     Results from last 7 days   Lab Units 07/04/24 2013   INR  1.18     Results from last 7 days   Lab Units 07/06/24  1137 07/06/24  0716 07/05/24  2126 07/05/24  1743 07/05/24  0027   POC GLUCOSE mg/dl 95 78 106 114 100               Lines/Drains:  Invasive Devices       Peripheral Intravenous Line  Duration             Peripheral IV 07/04/24 Dorsal (posterior);Left Forearm 1 day    Peripheral IV 07/04/24 Right;Ventral (anterior) Forearm 1 day              Hemodialysis Catheter  Duration             HD Permanent Double Catheter 21 days                      Telemetry:  Telemetry Orders (From admission, onward)               24 Hour Telemetry Monitoring  Continuous x 24 Hours (Telem)        Question:  Reason for 24 Hour Telemetry  Answer:  Pulmonary Embolism                     Telemetry Reviewed: Normal Sinus Rhythm and Sinus Bradycardia Indication for Continued Telemetry Use: PE             Imaging: Reviewed radiology reports from this admission including: chest xray, abdominal/pelvic CT, and ECHO    Recent Cultures (last 7 days):         Last 24 Hours Medication List:   Current Facility-Administered Medications   Medication Dose Route Frequency Provider Last Rate    acetaminophen  650 mg Oral Q6H PRN Mily Martin MD      [START ON 7/7/2024] amLODIPine  10 mg Oral Daily Andrea Longoria MD      atorvastatin  80 mg Oral Daily Mily Martin MD      carvedilol  6.25 mg Oral BID With Meals Andrea Longoria MD      ferrous sulfate  325 mg Oral BID With Meals Mily Martin MD      heparin (porcine)  3-30 Units/kg/hr (Order-Specific) Intravenous Titrated Gustavo Neville MD 24 Units/kg/hr (07/06/24 0858)    heparin (porcine)  10,000 Units Intravenous Q6H PRN Monique Yanes  DO Stephan      heparin (porcine)  5,000 Units Intravenous Q6H PRN Monique Rothman DO      isosorbide mononitrate  60 mg Oral Daily Mily Martin MD      labetalol  10 mg Intravenous Q6H PRN Liana Riley MD      oxyCODONE  10 mg Oral Q4H PRN Mily Martin MD      sevelamer  800 mg Oral TID With Meals Mily Martin MD      [START ON 7/7/2024] torsemide  100 mg Oral Daily Charles Spear MD          Today, Patient Was Seen By: Guy Vazquez MD    **Please Note: This note may have been constructed using a voice recognition system.**

## 2024-07-06 NOTE — ASSESSMENT & PLAN NOTE
Patient admitted to the ED for worsening chest pain, shortness of breath, and dyspnea on exertion.  Patient was admitted for similar chest pain in early June and was found to have ESRD, started on HD MWF   D-Dimer 4.21  CTA showed subsegmental right lower lobe pulmonary embolism  Placed on heparin drip  Patient no longer on nasal cannula  Echo:  Left Ventricle: Left ventricular cavity size is normal. There is mild concentric hypertrophy. The left ventricular ejection fraction is 65%. Systolic function is normal. Wall motion is normal. Diastolic function is normal. Right Ventricle: Right ventricular cavity size is normal. Systolic function is normal. Tricuspid Valve: There is trace regurgitation. Prior TTE study available for comparison. Prior study date: 6/4/2024. No significant changes noted compared to the prior study.    Plan-  Continue Heparin drip. To be held morning of renal biopsy (07/11/2024)  Transition to oral anticoagulation Eliquis after renal biopsy   Supplemental oxygen as needed  Monitor telemetry

## 2024-07-06 NOTE — ASSESSMENT & PLAN NOTE
Lab Results   Component Value Date    EGFR 6 07/06/2024    EGFR 5 07/05/2024    EGFR 6 07/04/2024    CREATININE 8.43 (H) 07/06/2024    CREATININE 9.93 (H) 07/05/2024    CREATININE 9.02 (H) 07/04/2024     Patient ESRD on Dialysis 3 times a week, MWF  Elevated , c/o of orthopnea   Etiology of CLOVIS: Etiology not entirely clear - Possible MGRS? Pattern of disease progression is not typical for diabetic nephropathy.   Etiology of CKD: Suspected due to diabetic nephropathy, hypertensive nephrosclerosis, obesity related renal dysfunction  Previous baseline creatinine 1.3-1.6 w/ prior episodes of CLOVIS  Serologic workup negative except for IgG kappa on SPEP and UPEP.  Cryoglobulin negative, ANCA negative, anti-dsDNA negative, anti-GBM negative, JOSE ROBERTO negative, C4 34, C3 118  Bone marrow biopsy 6/12/2024: negative for plasma cell neoplasm.   CRRT initiated 6/7/2024.  Peak creatinine 10.4  Last treatment 7/5/2024 with UF 3.5 L  EDW: 181 kg  electrolytes and acid/base stable  Creatinine 11.13 today    Plan:  Nephrology onboard  IR guided renal biopsy scheduled for Thursday 07/11/2024.Hold heparin that morning.  Hold oral AC. Resume ASA 81 mg daily after renal biopsy   HD today. Patient consented for transfusion of 1 unit of blood during HD.    Continue torsemide 100 mg daily

## 2024-07-06 NOTE — ASSESSMENT & PLAN NOTE
Patient O2 saturation on admission was 83% on RA  Was placed on 3/L Nasal canula FiO2 32%  CTA showed right lower lobe subsegmental PE  Was placed on heparin  Pt currently on RA    Plan:  Supplemental oxygen as needed  Continue heparin for PE  Incentive Spirometry

## 2024-07-06 NOTE — CONSULTS
e-Consult (IPC)  - Interventional Radiology  Joaquin Frankel 44 y.o. male MRN: [id number]  Unit/Bed#: S -01 Encounter: [id number]    Interventional Radiology has been consulted to evaluate Joaquin Frankel    We were consulted by Dr. Caballero concerning this patient with CLOVIS.    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  R/O MGRS or other etiologies.  Patient on Heparin and ASA.  Aspirin last dose was yesterday.  We must wait 5 days off of ASA before the kidney biopsy.  I discontinued the ASA this morning so we can perform the biopsy on Thursday earliest..  The heparin will need to be help Thursday morning.  Please coordinate this with the IR team on Monday.     21-30 minutes, >50% of the total time devoted to medical consultative verbal/EMR discussion between providers. Written report will be generated in the EMR.     Thank you for allowing Interventional Radiology to participate in the care of Joaquin Frankel. Please don't hesitate to call or TigerText us with any questions.     Gustavo Neville MD

## 2024-07-06 NOTE — PROGRESS NOTES
NEPHROLOGY HOSPITAL PROGRESS NOTE   Joaquin Frankel 44 y.o. male MRN: [id number]  Unit/Bed#: S -01 Encounter: [id number]  Reason for Consult: CLOVIS    ASSESSMENT and PLAN:  Acute kidney injury on HD (POA)  Had CLOVIS from previous admission in June 2024 and started HD then.   HD dependent since 6/7/24.   OP Clinic: [facility]  Access: R IJ permcath.   SCr 1.22 in Feb 2024, then 2.98 in April 2024, then 6.40 in June 2024.   Serologic workup negative except for IgG kappa on SPEP and UPEP.  Etiology not entirely clear - Possible MGRS?  Rapid progression of renal disease not typical for DM neph.   No evidence of renal recovery and will need continued HD support.  IR consulted for renal biopsy.  Completed HD yesterday.  Next HD is Monday, July 8, 2024.     Chronic kidney disease, stage III  Baseline creatinine 1.2 to 1.5.   No prior renal follow up.   Of note, he had nephrotic range proteinuria in April 2024.     Hypertension:  BP high on admission but better now.   Home Rx: Metoprolol succinate 25 mg OD, Torsemide 40 mg OD.   Current Rx: Amlodipine 10 mg OD, Carvedilol 6.25 mg BID, Torsemide 40 mg OD.   Increase Torsemide to 100 mg daily.   EDW is 181 kg.      Hx of CAD and with chest pain  Cardiology consulted.  Plan to optimize volume status.  Could consider cardiac catheterization.     Anemia  Hgb below goal but stable. 7.5 today.   No JOSSELYN due to acute PE.      Pulmonary embolism, respiratory failure.   On Heparin drip.   Hold off on NOAC because of needing a renal biopsy.      Nephrotic range proteinuria  Urine ACR was 5.2 gm in April 2024.      Mineral bone disease  Continue Sevelamer for hyperphos.   Phos at goal.      L LE lymphedema    PLAN SUMMARY:  Next HD is scheduled for Monday, July 8.  Increase torsemide to 100 mg daily.  Await renal biopsy.    SUBJECTIVE / 24H INTERVAL HISTORY:  Chest pain is better.   No significant SOB.   Had HD yesterday.  Tolerated 3.5 kg fluid  removal.    OBJECTIVE:  Current Weight: Weight - Scale: (!) 173 kg (382 lb 4.4 oz)  Vitals:    07/06/24 0600 07/06/24 0724 07/06/24 0725 07/06/24 1020   BP:   147/75 (!) 177/82   BP Location:   Left arm    Pulse:   58 60   Resp:   16    Temp:  98.2 °F (36.8 °C)     TempSrc:  Oral     SpO2:   95% 90%   Weight: (!) 173 kg (382 lb 4.4 oz)  (!) 173 kg (382 lb 4.4 oz)    Height:           Intake/Output Summary (Last 24 hours) at 7/6/2024 1107  Last data filed at 7/6/2024 0757  Gross per 24 hour   Intake 1760 ml   Output 4300 ml   Net -2540 ml     General: conscious, cooperative, no distress, obese  Skin: dry  Eyes: pink conjunctivae  ENT: moist mucous membranes  Respiratory: equal chest expansion, clear breath sounds.  Cardiovascular: distinct heart sounds, normal rate, regular rhythm, no rub  Abdomen: soft, non tender, non distended, normal bowel sounds  Extremities: (+) LE edema, L>R  Genitourinary: no lewis catheter.   Neuro: awake, alert.   Psych: appropriate affect    Medications:    Current Facility-Administered Medications:     acetaminophen (TYLENOL) tablet 650 mg, 650 mg, Oral, Q6H PRN, Mily Martin MD, 650 mg at 07/05/24 1251    [START ON 7/7/2024] amLODIPine (NORVASC) tablet 10 mg, 10 mg, Oral, Daily, Andrea Longoria MD    atorvastatin (LIPITOR) tablet 80 mg, 80 mg, Oral, Daily, Mily Martin MD, 80 mg at 07/06/24 0857    carvedilol (COREG) tablet 6.25 mg, 6.25 mg, Oral, BID With Meals, Andrea Longoria MD    ferrous sulfate tablet 325 mg, 325 mg, Oral, BID With Meals, Mily Martin MD, 325 mg at 07/06/24 0857    heparin (porcine) 25,000 units in 0.45% NaCl 250 mL infusion (premix), 3-30 Units/kg/hr (Order-Specific), Intravenous, Titrated, Gustavo Neville MD, Last Rate: 30 mL/hr at 07/06/24 0858, 24 Units/kg/hr at 07/06/24 0858    heparin (porcine) injection 10,000 Units, 10,000 Units, Intravenous, Q6H PRN, Monique Rothman DO    heparin (porcine) injection 5,000 Units, 5,000 Units,  Intravenous, Q6H PRN, Monique Rothman , 5,000 Units at 07/05/24 1708    isosorbide mononitrate (IMDUR) 24 hr tablet 60 mg, 60 mg, Oral, Daily, Mily Martin MD, 60 mg at 07/06/24 0857    labetalol (NORMODYNE) injection 10 mg, 10 mg, Intravenous, Q6H PRN, Liana Riley MD    oxyCODONE (ROXICODONE) immediate release tablet 10 mg, 10 mg, Oral, Q4H PRN, Mily Martin MD    sevelamer (RENAGEL) tablet 800 mg, 800 mg, Oral, TID With Meals, Mily Martin MD, 800 mg at 07/06/24 0857    torsemide (DEMADEX) tablet 40 mg, 40 mg, Oral, Daily, Mily Martin MD, 40 mg at 07/06/24 0857    Laboratory Results:  Results from last 7 days   Lab Units 07/06/24  0456 07/05/24  0411 07/04/24  1538   WBC Thousand/uL 6.83 6.72 6.92   HEMOGLOBIN g/dL 7.5* 7.6* 6.6*   HEMATOCRIT % 24.6* 24.4* 21.2*   PLATELETS Thousands/uL 214 229 254   POTASSIUM mmol/L 4.2 4.5 4.5   CHLORIDE mmol/L 96 97 96   CO2 mmol/L 29 31 27   BUN mg/dL 33* 32* 27*   CREATININE mg/dL 8.43* 9.93* 9.02*   CALCIUM mg/dL 8.8 8.8 9.0   MAGNESIUM mg/dL  --   --  1.9   PHOSPHORUS mg/dL 4.6*  --   --

## 2024-07-06 NOTE — ASSESSMENT & PLAN NOTE
Patient has history of anemia of chronic disease, ESRD  CBC on admission showed hgb 6.6 / hct 21.2  Received 2 units packed RBC's in ED. Hbg 7.6 after transfusion  Hematology following with outpatient f/u scheduled 7/11/2024, patient will reschedule due to overlapping hospital admission   On oral iron twice daily  No active bleeding, on heparin for PE, will switch to Eliquis after renal biopsy procedure.  Hgb below goal, but stable. Hgb 7.8 today.    Plan:  Continue to monitor hemoglobin levels  Transfuse 1 unit of blood today during HD. Pt signed consent.  Transfuse for Hgb <8.0

## 2024-07-06 NOTE — PROGRESS NOTES
General Cardiology Progress Note   Joaquin Frankel 44 y.o. male MRN: [id number]  Unit/Bed#: S -01 Encounter: [id number]      Assessment:  Principal Problem:    Chest pain  Active Problems:    Hypertension    Chronic acquired lymphedema    Acute respiratory failure with hypoxia (HCC)    Pulmonary embolism (HCC)    ESRD (end stage renal disease) on dialysis (HCC)    Anemia      Impression:    Chest pain-probably multifactorial  Super morbid obesity-BMI 59  Left lower extremity lymphedema  Hypertensive urgency  Blood pressures have trended down quite nicely, and are mostly 114/58 to 146/67 in the last 8 hours  Subsegmental pulmonary embolism, following recent hospitalization  Coronary artery disease (premature)-with recent stress testing that was notable for considerable artifact but no obvious ischemia  Uncontrolled hypercholesterolemia  End-stage renal disease-new start hemodialysis  4 L removed by dialysis yesterday    Plan:    Chest pain seems multifactorial, in the context of severe uncontrolled hypertension, end-stage renal disease, subsegmental pulmonary embolism, but no clear objective suggestion of angina/ACS.  Planning medical stabilization with hypertension, and follow-up symptoms after volume improvement with dialysis      Subjective:   Patient seen and examined.      Blood pressure is improving  Received dialysis, 4 L off  Continues to complain of chest discomfort, although has not walked much to see if there is recurrence today      Review of Systems   Constitutional: Negative for diaphoresis, fever, malaise/fatigue and night sweats.   Cardiovascular:  Positive for chest pain. Negative for dyspnea on exertion, leg swelling, orthopnea, palpitations and syncope.   Respiratory:  Positive for shortness of breath. Negative for cough, sputum production and wheezing.    Skin:  Negative for itching and rash.   Gastrointestinal:  Negative for abdominal pain, change in bowel habit and diarrhea.  "  Neurological:  Negative for dizziness, focal weakness, light-headedness and weakness.       Objective   Vitals: Blood pressure 147/75, pulse 58, temperature 98.2 °F (36.8 °C), temperature source Oral, resp. rate 16, height 5' 7\" (1.702 m), weight (!) 173 kg (382 lb 4.4 oz), SpO2 95%., Body mass index is 59.87 kg/m²., I/O last 3 completed shifts:  In: 1680 [P.O.:480; I.V.:500; Blood:700]  Out: 4000 [Other:4000]  I/O this shift:  In: 780 [P.O.:780]  Out: 300 [Urine:300]  Wt Readings from Last 3 Encounters:   07/06/24 (!) 173 kg (382 lb 4.4 oz)   06/26/24 (!) 182 kg (400 lb 9.2 oz)   05/30/24 (!) 187 kg (413 lb)       Intake/Output Summary (Last 24 hours) at 7/6/2024 0840  Last data filed at 7/6/2024 0757  Gross per 24 hour   Intake 1760 ml   Output 4300 ml   Net -2540 ml     I/O last 3 completed shifts:  In: 1680 [P.O.:480; I.V.:500; Blood:700]  Out: 4000 [Other:4000]        Physical Exam  Constitutional:       General: He is not in acute distress.     Appearance: He is not diaphoretic.   HENT:      Head: Normocephalic and atraumatic.   Neck:      Vascular: No JVD.   Cardiovascular:      Rate and Rhythm: Normal rate and regular rhythm.      Heart sounds: Normal heart sounds. No murmur heard.  Pulmonary:      Effort: Pulmonary effort is normal. No respiratory distress.      Breath sounds: Normal breath sounds. No wheezing or rales.   Abdominal:      General: Bowel sounds are normal. There is no distension.      Palpations: Abdomen is soft.      Tenderness: There is no abdominal tenderness.   Musculoskeletal:      Cervical back: Normal range of motion and neck supple.      Right lower leg: No edema.      Left lower leg: No edema (Massive lymphedema).   Skin:     General: Skin is warm and dry.   Neurological:      Mental Status: He is alert and oriented to person, place, and time.         Meds/Allergies   Allergies   Allergen Reactions    Keflex [Cephalexin] Facial Swelling and Lip Swelling    Amoxicillin Hives     " childhood       Current Facility-Administered Medications:     acetaminophen (TYLENOL) tablet 650 mg, 650 mg, Oral, Q6H PRN, Mily Martin MD, 650 mg at 07/05/24 1251    amLODIPine (NORVASC) tablet 5 mg, 5 mg, Oral, Daily, Deya Palomino MD, 5 mg at 07/05/24 1249    aspirin (ECOTRIN LOW STRENGTH) EC tablet 81 mg, 81 mg, Oral, Daily, Mily Martin MD, 81 mg at 07/05/24 0845    atorvastatin (LIPITOR) tablet 80 mg, 80 mg, Oral, Daily, Mily Martin MD, 80 mg at 07/05/24 0844    carvedilol (COREG) tablet 6.25 mg, 6.25 mg, Oral, BID With Meals, PETRA Castillo    ferrous sulfate tablet 325 mg, 325 mg, Oral, BID With Meals, Mily Martin MD, 325 mg at 07/05/24 0721    heparin (porcine) 25,000 units in 0.45% NaCl 250 mL infusion (premix), 3-30 Units/kg/hr (Order-Specific), Intravenous, Titrated, Mily Martin MD, Last Rate: 30 mL/hr at 07/06/24 0617, 24 Units/kg/hr at 07/06/24 0617    heparin (porcine) injection 10,000 Units, 10,000 Units, Intravenous, Q6H PRN, Monique Rothman DO    heparin (porcine) injection 5,000 Units, 5,000 Units, Intravenous, Q6H PRN, Monique Rothman DO, 5,000 Units at 07/05/24 1708    isosorbide mononitrate (IMDUR) 24 hr tablet 60 mg, 60 mg, Oral, Daily, Mily Martin MD, 60 mg at 07/05/24 0846    labetalol (NORMODYNE) injection 10 mg, 10 mg, Intravenous, Q6H PRN, Liana Riley MD    oxyCODONE (ROXICODONE) immediate release tablet 10 mg, 10 mg, Oral, Q4H PRN, Mily Martin MD    sevelamer (RENAGEL) tablet 800 mg, 800 mg, Oral, TID With Meals, Mily Martin MD, 800 mg at 07/05/24 1154    torsemide (DEMADEX) tablet 40 mg, 40 mg, Oral, Daily, Mily Martin MD, 40 mg at 07/05/24 0846    Laboratory Results:        CBC with diff:   Results from last 7 days   Lab Units 07/06/24  0456 07/05/24  0411 07/04/24  1538   WBC Thousand/uL 6.83 6.72 6.92   HEMOGLOBIN g/dL 7.5* 7.6* 6.6*   HEMATOCRIT % 24.6* 24.4* 21.2*   MCV fL 94 92 93  "  PLATELETS Thousands/uL 214 229 254   RBC Million/uL 2.63* 2.64* 2.27*   MCH pg 28.5 28.8 29.1   MCHC g/dL 30.5* 31.1* 31.1*   RDW % 14.5 14.7 14.3   MPV fL 9.2 9.0 8.9   NRBC AUTO /100 WBCs 0  --  0       CMP:  Results from last 7 days   Lab Units 07/06/24  0456 07/05/24 0411 07/04/24  1538   SODIUM mmol/L 136 137 135   POTASSIUM mmol/L 4.2 4.5 4.5   CHLORIDE mmol/L 96 97 96   CO2 mmol/L 29 31 27   BUN mg/dL 33* 32* 27*   CREATININE mg/dL 8.43* 9.93* 9.02*   CALCIUM mg/dL 8.8 8.8 9.0   AST U/L  --   --  15   ALT U/L  --   --  <3*   ALK PHOS U/L  --   --  90   EGFR ml/min/1.73sq m 6 5 6       BMP:  Results from last 7 days   Lab Units 07/06/24  0456 07/05/24 0411 07/04/24  1538   SODIUM mmol/L 136 137 135   POTASSIUM mmol/L 4.2 4.5 4.5   CHLORIDE mmol/L 96 97 96   CO2 mmol/L 29 31 27   BUN mg/dL 33* 32* 27*   CREATININE mg/dL 8.43* 9.93* 9.02*   CALCIUM mg/dL 8.8 8.8 9.0       NT-proBNP: No results for input(s): \"NTBNP\" in the last 72 hours.     Magnesium:   Results from last 7 days   Lab Units 07/04/24  1538   MAGNESIUM mg/dL 1.9       Coags:   Results from last 7 days   Lab Units 07/06/24  0456 07/05/24  2310 07/05/24  1635 07/05/24  0955 07/05/24  0242 07/04/24 2013   PTT seconds 65* 65* 58* 43* 54* 32   INR   --   --   --   --   --  1.18       TSH:        Hemoglobin A1C )      Lipid Profile:   Lab Results   Component Value Date    CHOL 147 12/29/2016     Lab Results   Component Value Date    HDL 28 (L) 04/29/2024    HDL 31 (L) 04/04/2023    HDL 29 (L) 03/30/2022     Lab Results   Component Value Date    LDLCALC 121 (H) 04/29/2024    LDLCALC 84 04/04/2023    LDLCALC 63 03/30/2022     No results found for: \"LDLDIRECT\"  Lab Results   Component Value Date    TRIG 198 (H) 04/29/2024    TRIG 131 04/04/2023    TRIG 83 03/30/2022       Cardiac testing:   EKG personally reviewed by Andrea Longoria MD.     Cath:  ECHO:  Stress TEST:  Other:        Andrea Longoria MD    Portions of the record may have been created with voice " "recognition software.  Occasional wrong word or \"sound a like\" substitutions may have occurred due to the inherent limitations of voice recognition software.  Read the chart carefully and recognize, using context, where substitutions have occurred.        "

## 2024-07-06 NOTE — H&P (VIEW-ONLY)
e-Consult (IPC)  - Interventional Radiology  Joaquin Frankel 44 y.o. male MRN: [id number]  Unit/Bed#: S -01 Encounter: [id number]    Interventional Radiology has been consulted to evaluate Joaquin Frankel    We were consulted by Dr. Caballero concerning this patient with CLOVIS.    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  R/O MGRS or other etiologies.  Patient on Heparin and ASA.  Aspirin last dose was yesterday.  We must wait 5 days off of ASA before the kidney biopsy.  I discontinued the ASA this morning so we can perform the biopsy on Thursday earliest..  The heparin will need to be help Thursday morning.  Please coordinate this with the IR team on Monday.     21-30 minutes, >50% of the total time devoted to medical consultative verbal/EMR discussion between providers. Written report will be generated in the EMR.     Thank you for allowing Interventional Radiology to participate in the care of Joaquin Frankel. Please don't hesitate to call or TigerText us with any questions.     Gustavo Neville MD

## 2024-07-06 NOTE — ASSESSMENT & PLAN NOTE
Chronic LLE lymphedema x 3 years after surgical resection of LLE cyst    Plan:  Advised compression and elevation of lower extremity for symptomatic edema relief

## 2024-07-06 NOTE — ASSESSMENT & PLAN NOTE
History of HTN, recent medication change after dx of ESRD  Workup at that time notable for IgG kappa monoclonal gammopathy and s/p bone marrow biopsy on 6/12/2023 to assess for multiple myeloma. Bone marrow biopsy with no evidence of plasma cell neoplasm.   HD on MWF schedule.   Presented to ED with uncontrolled /80. Endorsed headaches and blurry vision in his left eye in the ED that has since resolved.  BP improved but still suboptimal. Last /82.  24-hour average BP was 142/73.     Plan:  Continue amlodipine 10 mg daily, carvedilol 6.25 mg twice daily, Imdur 60 mg once daily, torsemide 100 mg daily  Hold carvedilol if HR <50  Nephrology and cardiology onbooard   Continue UF with dialysis as BP tolerates   HD today

## 2024-07-06 NOTE — PLAN OF CARE
Problem: Potential for Falls  Goal: Patient will remain free of falls  Description: INTERVENTIONS:  - Educate patient/family on patient safety including physical limitations  - Instruct patient to call for assistance with activity   - Consult OT/PT to assist with strengthening/mobility   - Keep Call bell within reach  - Keep bed low and locked with side rails adjusted as appropriate  - Keep care items and personal belongings within reach  - Initiate and maintain comfort rounds  - Make Fall Risk Sign visible to staff  - Offer Toileting every 2 Hours, in advance of need  - Initiate/Maintain 2alarm  - Obtain necessary fall risk management equipment: 2  - Apply yellow socks and bracelet for high fall risk patients  - Consider moving patient to room near nurses station  Outcome: Progressing     Problem: METABOLIC, FLUID AND ELECTROLYTES - ADULT  Goal: Electrolytes maintained within normal limits  Description: INTERVENTIONS:  - Monitor labs and assess patient for signs and symptoms of electrolyte imbalances  - Administer electrolyte replacement as ordered  - Monitor response to electrolyte replacements, including repeat lab results as appropriate  - Instruct patient on fluid and nutrition as appropriate  Outcome: Progressing  Goal: Fluid balance maintained  Description: INTERVENTIONS:  - Monitor labs   - Monitor I/O and WT  - Instruct patient on fluid and nutrition as appropriate  - Assess for signs & symptoms of volume excess or deficit  Outcome: Progressing     Problem: Prexisting or High Potential for Compromised Skin Integrity  Goal: Skin integrity is maintained or improved  Description: INTERVENTIONS:  - Identify patients at risk for skin breakdown  - Assess and monitor skin integrity  - Assess and monitor nutrition and hydration status  - Monitor labs   - Assess for incontinence   - Turn and reposition patient  - Assist with mobility/ambulation  - Relieve pressure over bony prominences  - Avoid friction and  shearing  - Provide appropriate hygiene as needed including keeping skin clean and dry  - Evaluate need for skin moisturizer/barrier cream  - Collaborate with interdisciplinary team   - Patient/family teaching  - Consider wound care consult   Outcome: Progressing

## 2024-07-07 LAB
ANION GAP SERPL CALCULATED.3IONS-SCNC: 11 MMOL/L (ref 4–13)
APTT PPP: 60 SECONDS (ref 23–37)
BUN SERPL-MCNC: 45 MG/DL (ref 5–25)
CALCIUM SERPL-MCNC: 8.8 MG/DL (ref 8.4–10.2)
CHLORIDE SERPL-SCNC: 97 MMOL/L (ref 96–108)
CO2 SERPL-SCNC: 25 MMOL/L (ref 21–32)
CREAT SERPL-MCNC: 10.2 MG/DL (ref 0.6–1.3)
ERYTHROCYTE [DISTWIDTH] IN BLOOD BY AUTOMATED COUNT: 14.1 % (ref 11.6–15.1)
GFR SERPL CREATININE-BSD FRML MDRD: 5 ML/MIN/1.73SQ M
GLUCOSE SERPL-MCNC: 102 MG/DL (ref 65–140)
GLUCOSE SERPL-MCNC: 118 MG/DL (ref 65–140)
GLUCOSE SERPL-MCNC: 80 MG/DL (ref 65–140)
GLUCOSE SERPL-MCNC: 82 MG/DL (ref 65–140)
GLUCOSE SERPL-MCNC: 86 MG/DL (ref 65–140)
HCT VFR BLD AUTO: 25.2 % (ref 36.5–49.3)
HGB BLD-MCNC: 7.9 G/DL (ref 12–17)
MCH RBC QN AUTO: 29.3 PG (ref 26.8–34.3)
MCHC RBC AUTO-ENTMCNC: 31.3 G/DL (ref 31.4–37.4)
MCV RBC AUTO: 93 FL (ref 82–98)
MRSA NOSE QL CULT: NORMAL
PLATELET # BLD AUTO: 240 THOUSANDS/UL (ref 149–390)
PMV BLD AUTO: 9.2 FL (ref 8.9–12.7)
POTASSIUM SERPL-SCNC: 4.2 MMOL/L (ref 3.5–5.3)
RBC # BLD AUTO: 2.7 MILLION/UL (ref 3.88–5.62)
SODIUM SERPL-SCNC: 133 MMOL/L (ref 135–147)
WBC # BLD AUTO: 7.09 THOUSAND/UL (ref 4.31–10.16)

## 2024-07-07 PROCEDURE — 82948 REAGENT STRIP/BLOOD GLUCOSE: CPT

## 2024-07-07 PROCEDURE — 99232 SBSQ HOSP IP/OBS MODERATE 35: CPT | Performed by: INTERNAL MEDICINE

## 2024-07-07 PROCEDURE — 85730 THROMBOPLASTIN TIME PARTIAL: CPT | Performed by: INTERNAL MEDICINE

## 2024-07-07 PROCEDURE — 99233 SBSQ HOSP IP/OBS HIGH 50: CPT | Performed by: INTERNAL MEDICINE

## 2024-07-07 PROCEDURE — 85027 COMPLETE CBC AUTOMATED: CPT | Performed by: INTERNAL MEDICINE

## 2024-07-07 PROCEDURE — 80048 BASIC METABOLIC PNL TOTAL CA: CPT | Performed by: INTERNAL MEDICINE

## 2024-07-07 RX ORDER — HYDROMORPHONE HCL/PF 1 MG/ML
0.5 SYRINGE (ML) INJECTION EVERY 4 HOURS PRN
Status: DISCONTINUED | OUTPATIENT
Start: 2024-07-07 | End: 2024-07-08

## 2024-07-07 RX ORDER — LIDOCAINE 50 MG/G
1 PATCH TOPICAL DAILY
Status: DISCONTINUED | OUTPATIENT
Start: 2024-07-07 | End: 2024-07-20

## 2024-07-07 RX ADMIN — OXYCODONE HYDROCHLORIDE 10 MG: 10 TABLET ORAL at 13:11

## 2024-07-07 RX ADMIN — FERROUS SULFATE TAB 325 MG (65 MG ELEMENTAL FE) 325 MG: 325 (65 FE) TAB at 15:39

## 2024-07-07 RX ADMIN — OXYCODONE HYDROCHLORIDE 10 MG: 10 TABLET ORAL at 17:46

## 2024-07-07 RX ADMIN — SEVELAMER HYDROCHLORIDE 800 MG: 800 TABLET ORAL at 15:39

## 2024-07-07 RX ADMIN — FERROUS SULFATE TAB 325 MG (65 MG ELEMENTAL FE) 325 MG: 325 (65 FE) TAB at 08:27

## 2024-07-07 RX ADMIN — HEPARIN SODIUM 24 UNITS/KG/HR: 10000 INJECTION, SOLUTION INTRAVENOUS at 11:25

## 2024-07-07 RX ADMIN — OXYCODONE HYDROCHLORIDE 10 MG: 10 TABLET ORAL at 09:10

## 2024-07-07 RX ADMIN — SEVELAMER HYDROCHLORIDE 800 MG: 800 TABLET ORAL at 08:27

## 2024-07-07 RX ADMIN — ATORVASTATIN CALCIUM 80 MG: 40 TABLET, FILM COATED ORAL at 08:26

## 2024-07-07 RX ADMIN — CARVEDILOL 6.25 MG: 6.25 TABLET, FILM COATED ORAL at 15:39

## 2024-07-07 RX ADMIN — TORSEMIDE 100 MG: 100 TABLET ORAL at 08:27

## 2024-07-07 RX ADMIN — HEPARIN SODIUM 24 UNITS/KG/HR: 10000 INJECTION, SOLUTION INTRAVENOUS at 20:25

## 2024-07-07 RX ADMIN — ISOSORBIDE MONONITRATE 60 MG: 60 TABLET, EXTENDED RELEASE ORAL at 08:27

## 2024-07-07 RX ADMIN — HYDROMORPHONE HYDROCHLORIDE 0.5 MG: 1 INJECTION, SOLUTION INTRAMUSCULAR; INTRAVENOUS; SUBCUTANEOUS at 01:02

## 2024-07-07 RX ADMIN — OXYCODONE HYDROCHLORIDE 10 MG: 10 TABLET ORAL at 04:01

## 2024-07-07 RX ADMIN — HEPARIN SODIUM 24 UNITS/KG/HR: 10000 INJECTION, SOLUTION INTRAVENOUS at 02:44

## 2024-07-07 RX ADMIN — SEVELAMER HYDROCHLORIDE 800 MG: 800 TABLET ORAL at 11:25

## 2024-07-07 RX ADMIN — HYDROMORPHONE HYDROCHLORIDE 0.5 MG: 1 INJECTION, SOLUTION INTRAMUSCULAR; INTRAVENOUS; SUBCUTANEOUS at 20:23

## 2024-07-07 RX ADMIN — AMLODIPINE BESYLATE 10 MG: 10 TABLET ORAL at 08:26

## 2024-07-07 RX ADMIN — CARVEDILOL 6.25 MG: 6.25 TABLET, FILM COATED ORAL at 08:27

## 2024-07-07 RX ADMIN — HYDROMORPHONE HYDROCHLORIDE 0.5 MG: 1 INJECTION, SOLUTION INTRAMUSCULAR; INTRAVENOUS; SUBCUTANEOUS at 06:00

## 2024-07-07 NOTE — ASSESSMENT & PLAN NOTE
Lab Results   Component Value Date    HGBA1C 5.7 (H) 06/09/2024       Recent Labs     07/06/24  1610 07/06/24  2104 07/07/24  0718 07/07/24  1119   POCGLU 110 115 80 86       Blood Sugar Average: Last 72 hrs:  (P) [id number]

## 2024-07-07 NOTE — ASSESSMENT & PLAN NOTE
Overnight pulse oximetry obtain: highest O2 sat 99%, his lowest O2 sat 73%, with a mean of 91%. Patient had 120 desaturation events <89%.  He spent an 1 hour and 15 minutes in desaturation (~15.82%) and and 1 hour and 34 minutes with desaturation <89% (~19.77%).  Counseled patient on importance of maintaining oxygen saturation as oxygen deprivation can lead to hypertension, heart failure, coronary artery disease, pulmonary hypertension, etc.    Patient was advised of need to wear oxygen at night upon discharge. He declines stating he would like to wait for formal evaluation via sleep study as an outpatient.

## 2024-07-07 NOTE — PLAN OF CARE
Problem: Potential for Falls  Goal: Patient will remain free of falls  Description: INTERVENTIONS:  - Educate patient/family on patient safety including physical limitations  - Instruct patient to call for assistance with activity   - Consult OT/PT to assist with strengthening/mobility   - Keep Call bell within reach  - Keep bed low and locked with side rails adjusted as appropriate  - Keep care items and personal belongings within reach  - Initiate and maintain comfort rounds  - Make Fall Risk Sign visible to staff  - Offer Toileting every 2 Hours, in advance of need    - Apply yellow socks and bracelet for high fall risk patients  - Consider moving patient to room near nurses station  Outcome: Progressing     Problem: METABOLIC, FLUID AND ELECTROLYTES - ADULT  Goal: Electrolytes maintained within normal limits  Description: INTERVENTIONS:  - Monitor labs and assess patient for signs and symptoms of electrolyte imbalances  - Administer electrolyte replacement as ordered  - Monitor response to electrolyte replacements, including repeat lab results as appropriate  - Instruct patient on fluid and nutrition as appropriate  Outcome: Progressing  Goal: Fluid balance maintained  Description: INTERVENTIONS:  - Monitor labs   - Monitor I/O and WT  - Instruct patient on fluid and nutrition as appropriate  - Assess for signs & symptoms of volume excess or deficit  Outcome: Progressing     Problem: Prexisting or High Potential for Compromised Skin Integrity  Goal: Skin integrity is maintained or improved  Description: INTERVENTIONS:  - Identify patients at risk for skin breakdown  - Assess and monitor skin integrity  - Assess and monitor nutrition and hydration status  - Monitor labs   - Assess for incontinence   - Turn and reposition patient  - Assist with mobility/ambulation  - Relieve pressure over bony prominences  - Avoid friction and shearing  - Provide appropriate hygiene as needed including keeping skin clean and  dry  - Evaluate need for skin moisturizer/barrier cream  - Collaborate with interdisciplinary team   - Patient/family teaching  - Consider wound care consult   Outcome: Progressing

## 2024-07-07 NOTE — ED PROVIDER NOTES
History  Chief Complaint   Patient presents with    Chest Pain     X1 month, SOB. Recent dx kidney failure and dialysis     HPI    Patient is a 44-year-old male with significant past medical history including CAD with MI and stenting x 2, recent hospitalization for acute renal failure where he was recently started on dialysis presenting with chest discomfort.  He says that he has had ongoing chest discomfort for 1 month which was part of why he was admitted to the hospital recently, however, he says pain has worsened since discharge and feels very similar to when he had previous heart attacks.  He has been adhering to his Monday, Wednesday, Friday dialysis schedule and says he has been taking his medications as prescribed.  He says he does have some intermittent nausea, but no vomiting, no fevers, no abdominal pain.  He says pain is localized to the chest but radiates upward into the jaw area.  Of note, during his hospitalization, he did undergo stress testing and echocardiogram which showed what appeared to be an apical perfusion defect but nothing large enough to warrant stenting at that time.    Prior to Admission Medications   Prescriptions Last Dose Informant Patient Reported? Taking?   Ascorbic Acid (vitamin C) 100 MG tablet   No No   Sig: Take 1 tablet (100 mg total) by mouth daily   Cholecalciferol (VITAMIN D3) 1,000 units tablet 7/4/2024  No Yes   Sig: Take 2 tablets (2,000 Units total) by mouth daily   Insulin Pen Needle (BD ULTRA-FINE PEN NEEDLES) 29G X 12.7MM MISC More than a month Self No No   Sig: USE 1 NEEDLE ONCE DAILY FOR INJECTION UNDER THE SKIN   aspirin (ECOTRIN LOW STRENGTH) 81 mg EC tablet 7/4/2024 Self No Yes   Sig: Take 1 tablet (81 mg total) by mouth daily   atorvastatin (LIPITOR) 80 mg tablet 7/4/2024 Self No Yes   Sig: Take 1 tablet (80 mg total) by mouth daily   cyanocobalamin (VITAMIN B-12) 1000 MCG tablet 7/4/2024  No Yes   Sig: Take 1 tablet (1,000 mcg total) by mouth daily   ferrous  "sulfate 324 (65 Fe) mg 7/4/2024  No Yes   Sig: Take 1 tablet (324 mg total) by mouth 2 (two) times a day before meals   glimepiride (AMARYL) 2 mg tablet  Self No No   Sig: Take 1 tablet (2 mg total) by mouth daily with breakfast   insulin glargine (LANTUS) 100 units/mL subcutaneous injection Not Taking Self No No   Sig: Inject 12 Units under the skin daily at bedtime Do not start before February 21, 2024.   Patient not taking: Reported on 7/5/2024   isosorbide mononitrate (IMDUR) 60 mg 24 hr tablet 7/4/2024  No Yes   Sig: Take 1 tablet (60 mg total) by mouth daily   metoprolol succinate (TOPROL-XL) 25 mg 24 hr tablet 7/4/2024  No Yes   Sig: Take 1 tablet (25 mg total) by mouth daily   nystatin (MYCOSTATIN) powder   No No   Sig: Apply 1 Application topically 2 (two) times a day   oxyCODONE (ROXICODONE) 10 MG TABS Not Taking  No No   Sig: Take 1 tablet (10 mg total) by mouth every 4 (four) hours as needed for moderate pain or severe pain Max Daily Amount: 60 mg   Patient not taking: Reported on 7/5/2024   sevelamer (RENAGEL) 800 mg tablet 7/4/2024  No Yes   Sig: Take 1 tablet (800 mg total) by mouth 3 (three) times a day with meals   torsemide (DEMADEX) 20 mg tablet 7/4/2024  No Yes   Sig: Take 2 tablets (40 mg total) by mouth daily      Facility-Administered Medications: None       Past Medical History:   Diagnosis Date    Acute hypoxic respiratory failure (HCC) 10/07/2023    Arthritis     Breathing difficulty     Cellulitis 10/07/2023    Coronary artery disease     Diabetes mellitus ([facility])     Diabetic neuropathy ([facility]) 05/28/2021    Heart disease     CAD   s/p ptca with 1 stent 2012    Hidradenitis suppurativa     groin    History of transfusion     Hyperlipidemia     Hypertension     MI (myocardial infarction) ([facility])     \" silent \" M.I. in the past    Morbid obesity with BMI of 50.0-59.9, adult ([facility])     Nicotine dependence     Obesity     Pilonidal cyst     Type 1 non-ST elevation myocardial infarction (NSTEMI) " ([facility]) 11/29/2020       Past Surgical History:   Procedure Laterality Date    CARDIAC SURGERY      CORONARY ANGIOPLASTY WITH STENT PLACEMENT      INCISION AND DRAINAGE OF WOUND N/A 1/24/2017    Procedure: INCISION AND DRAINAGE (I&D) BUTTOCK, PILONIDAL CYST;  Surgeon: Simba Adhikari MD;  Location: [facility];  Service:     IR BIOPSY BONE MARROW  6/12/2024    IR TEMPORARY DIALYSIS CATHETER PLACEMENT  6/6/2024    IR TUNNELED CENTRAL LINE PLACEMENT  6/14/2024    LEG SURGERY Left     I&D of left leg 2012    RI DEBRIDEMENT SUBCUTANEOUS TISSUE 1ST 20 SQ CM/< Left 7/6/2021    Procedure: DEBRIDEMENT WOUND (WASH OUT);  Surgeon: Sudheer Mcfarlane MD;  Location: [facility];  Service: General    RI EXC B9 LESION MRGN XCP SK TG T/A/L >4.0 CM Left 4/6/2021    Procedure: EXCISION THIGH MASS;  Surgeon: Sudheer Mcfarlane MD;  [facility];  Service: General    RI EXCISION H/P/P/U COMPLEX REPAIR Left 7/6/2021    Procedure: EXCISION PERINEAL ABSCESS LEFT THIGH;  Surgeon: Sudheer Mcfarlane MD;  [facility];  Service: General    RI NEGATIVE PRESSURE WOUND THERAPY DME <= 50 SQ CM Left 4/6/2021    Procedure: APPLICATION VAC DRESSING THIGH;  Surgeon: Sudheer Mcfarlane MD;  [facility];  Service: General    WOUND DEBRIDEMENT Left 4/17/2021    Procedure: DEBRIDEMENT WOUND AND DRESSING CHANGE (WASH OUT);  Surgeon: Mahin Traore DO;  [facility];  Service: General    WOUND DEBRIDEMENT Left 4/22/2021    Procedure: DEBRIDEMENT LOWER EXTREMITY (WASH OUT), left groin and thigh;  Surgeon: Sudheer Mcfarlane MD;  [facility];  Service: General    WOUND DEBRIDEMENT Left 5/26/2021    Procedure: DEBRIDEMENT LOWER EXTREMITY (WASH OUT);  Surgeon: Zak Castanon DO;  [facility];  Service: General    WOUND DEBRIDEMENT Left 5/28/2021    Procedure: Washout left thigh wound and closure;  Surgeon: Amor Jaramillo DO;  [facility];  Service: General       Family History   Problem Relation Age of Onset    Heart disease Father      Diabetes Father     Hypertension Mother     Heart disease Mother      I have reviewed and agree with the history as documented.    E-Cigarette/Vaping    E-Cigarette Use Never User      E-Cigarette/Vaping Substances    Nicotine No     THC No     CBD No     Flavoring No     Other No     Unknown No      Social History     Tobacco Use    Smoking status: Former     Current packs/day: 0.00     Average packs/day: 1.5 packs/day for 20.0 years (29.9 ttl pk-yrs)     Types: Cigarettes     Start date: 01/2021     Quit date: 03/2021     Years since quitting: 3.3     Passive exposure: Past    Smokeless tobacco: Never   Vaping Use    Vaping status: Never Used   Substance Use Topics    Alcohol use: Not Currently     Comment: rarely    Drug use: No        Review of Systems   Constitutional:  Positive for appetite change and fatigue. Negative for chills and fever.   HENT:  Negative for ear pain and sore throat.    Eyes:  Negative for pain and visual disturbance.   Respiratory:  Positive for shortness of breath. Negative for cough.    Cardiovascular:  Positive for chest pain. Negative for palpitations.   Gastrointestinal:  Positive for nausea. Negative for abdominal pain and vomiting.   Genitourinary:  Negative for dysuria and hematuria.   Musculoskeletal:  Negative for arthralgias and back pain.   Skin:  Negative for color change and rash.   Neurological:  Negative for seizures and syncope.   All other systems reviewed and are negative.      Physical Exam  ED Triage Vitals   Temperature Pulse Respirations Blood Pressure SpO2   07/04/24 1448 07/04/24 1448 07/04/24 1448 07/04/24 1448 07/04/24 1448   98.1 °F (36.7 °C) 92 18 (!) 194/80 (S) (!) 83 %      Temp Source Heart Rate Source Patient Position - Orthostatic VS BP Location FiO2 (%)   07/04/24 1830 07/04/24 1530 07/04/24 1638 07/04/24 1638 --   Oral Monitor Sitting Right arm       Pain Score       07/04/24 1638       7             Orthostatic Vital Signs  Vitals:    07/06/24 1545  07/06/24 1548 07/06/24 1610 07/06/24 2106   BP: 138/70 138/70 131/66 157/75   Pulse: 93 69 65 (!) 53   Patient Position - Orthostatic VS:           Physical Exam  Vitals and nursing note reviewed.   Constitutional:       Appearance: He is well-developed.   HENT:      Head: Normocephalic and atraumatic.   Eyes:      Conjunctiva/sclera: Conjunctivae normal.   Neck:      Vascular: No JVD.   Cardiovascular:      Rate and Rhythm: Regular rhythm. Bradycardia present.      Heart sounds: No murmur heard.  Pulmonary:      Effort: Pulmonary effort is normal. No respiratory distress.      Breath sounds: Normal breath sounds.   Abdominal:      Palpations: Abdomen is soft.      Tenderness: There is no abdominal tenderness.   Musculoskeletal:         General: No swelling.      Cervical back: Neck supple.      Right lower leg: No edema.      Left lower leg: Edema present.      Comments: Significant lymphedema to the left lower extremity   Skin:     General: Skin is warm and dry.   Neurological:      General: No focal deficit present.      Mental Status: He is alert and oriented to person, place, and time.   Psychiatric:         Mood and Affect: Mood normal.         ED Medications  Medications   heparin (porcine) 25,000 units in 0.45% NaCl 250 mL infusion (premix) (24 Units/kg/hr × 125 kg (Order-Specific) Intravenous New Bag 7/6/24 1920)   heparin (porcine) injection 10,000 Units (has no administration in time range)   heparin (porcine) injection 5,000 Units (5,000 Units Intravenous Given 7/5/24 1708)   acetaminophen (TYLENOL) tablet 650 mg (650 mg Oral Given 7/5/24 1251)   atorvastatin (LIPITOR) tablet 80 mg (80 mg Oral Given 7/6/24 0857)   ferrous sulfate tablet 325 mg (325 mg Oral Given 7/6/24 1545)   isosorbide mononitrate (IMDUR) 24 hr tablet 60 mg (60 mg Oral Given 7/6/24 0857)   oxyCODONE (ROXICODONE) immediate release tablet 10 mg (10 mg Oral Given 7/6/24 2259)   sevelamer (RENAGEL) tablet 800 mg (800 mg Oral Given 7/6/24  1545)   labetalol (NORMODYNE) injection 10 mg (has no administration in time range)   carvedilol (COREG) tablet 6.25 mg (6.25 mg Oral Given 7/6/24 1545)   amLODIPine (NORVASC) tablet 10 mg (has no administration in time range)   torsemide (DEMADEX) tablet 100 mg (has no administration in time range)   nitroglycerin (NITROSTAT) SL tablet 0.4 mg (0.4 mg Sublingual Given 7/4/24 1502)   ondansetron (ZOFRAN) injection 4 mg (4 mg Intravenous Given 7/4/24 1549)   nitroglycerin (NITROSTAT) SL tablet 0.4 mg (0.4 mg Sublingual Given 7/4/24 1616)   acetaminophen (TYLENOL) tablet 975 mg (975 mg Oral Given 7/4/24 1727)   iohexol (OMNIPAQUE) 350 MG/ML injection (MULTI-DOSE) 85 mL (85 mL Intravenous Given 7/4/24 1806)   heparin (porcine) injection 10,000 Units (10,000 Units Intravenous Given 7/4/24 2035)   nitroglycerin (NITRO-BID) 2 % TD ointment 1 inch (1 inch Topical Given 7/5/24 0027)   perflutren lipid microsphere (DEFINITY) injection (0.4 mL/min Intravenous Given 7/5/24 1158)       Diagnostic Studies  Results Reviewed       Procedure Component Value Units Date/Time    Basic metabolic panel [[id number]]  (Abnormal) Collected: 07/06/24 0456    Lab Status: Final result Specimen: Blood from Hand, Right Updated: 07/06/24 0601     Sodium 136 mmol/L      Potassium 4.2 mmol/L      Chloride 96 mmol/L      CO2 29 mmol/L      ANION GAP 11 mmol/L      BUN 33 mg/dL      Creatinine 8.43 mg/dL      Glucose 85 mg/dL      Calcium 8.8 mg/dL      eGFR 6 ml/min/1.73sq m     Narrative:      National Kidney Disease Foundation guidelines for Chronic Kidney Disease (CKD):     Stage 1 with normal or high GFR (GFR > 90 mL/min/1.73 square meters)    Stage 2 Mild CKD (GFR = 60-89 mL/min/1.73 square meters)    Stage 3A Moderate CKD (GFR = 45-59 mL/min/1.73 square meters)    Stage 3B Moderate CKD (GFR = 30-44 mL/min/1.73 square meters)    Stage 4 Severe CKD (GFR = 15-29 mL/min/1.73 square meters)    Stage 5 End Stage CKD (GFR <15 mL/min/1.73 square  meters)  Note: GFR calculation is accurate only with a steady state creatinine    Phosphorus [[id number]]  (Abnormal) Collected: 07/06/24 0456    Lab Status: Final result Specimen: Blood from Hand, Right Updated: 07/06/24 0601     Phosphorus 4.6 mg/dL     CBC and differential [[id number]]  (Abnormal) Collected: 07/06/24 0456    Lab Status: Final result Specimen: Blood from Hand, Right Updated: 07/06/24 0538     WBC 6.83 Thousand/uL      RBC 2.63 Million/uL      Hemoglobin 7.5 g/dL      Hematocrit 24.6 %      MCV 94 fL      MCH 28.5 pg      MCHC 30.5 g/dL      RDW 14.5 %      MPV 9.2 fL      Platelets 214 Thousands/uL      nRBC 0 /100 WBCs      Segmented % 66 %      Immature Grans % 1 %      Lymphocytes % 16 %      Monocytes % 9 %      Eosinophils Relative 7 %      Basophils Relative 1 %      Absolute Neutrophils 4.57 Thousands/µL      Absolute Immature Grans 0.05 Thousand/uL      Absolute Lymphocytes 1.10 Thousands/µL      Absolute Monocytes 0.60 Thousand/µL      Eosinophils Absolute 0.46 Thousand/µL      Basophils Absolute 0.05 Thousands/µL     APTT [[id number]]  (Abnormal) Collected: 07/05/24 2310    Lab Status: Final result Specimen: Blood from Hand, Right Updated: 07/05/24 2343     PTT 65 seconds     Fingerstick Glucose (POCT) [[id number]]  (Normal) Collected: 07/05/24 1743    Lab Status: Final result Specimen: Blood Updated: 07/05/24 1744     POC Glucose 114 mg/dl     APTT [[id number]]  (Abnormal) Collected: 07/05/24 1635    Lab Status: Final result Specimen: Blood from Central Venous Line Updated: 07/05/24 1700     PTT 58 seconds     Hepatitis Panel (IP Renal Unit) [[id number]] Collected: 07/05/24 1633    Lab Status: In process Specimen: Blood from Central Venous Line Updated: 07/05/24 1641    APTT [[id number]]  (Abnormal) Collected: 07/05/24 0955    Lab Status: Final result Specimen: Blood from Arm, Right Updated: 07/05/24 1023     PTT 43 seconds     Basic metabolic panel [[id number]]  (Abnormal) Collected: 07/05/24  0411    Lab Status: Final result Specimen: Blood from Line, Venous Updated: 07/05/24 0458     Sodium 137 mmol/L      Potassium 4.5 mmol/L      Chloride 97 mmol/L      CO2 31 mmol/L      ANION GAP 9 mmol/L      BUN 32 mg/dL      Creatinine 9.93 mg/dL      Glucose 80 mg/dL      Calcium 8.8 mg/dL      eGFR 5 ml/min/1.73sq m     Narrative:      National Kidney Disease Foundation guidelines for Chronic Kidney Disease (CKD):     Stage 1 with normal or high GFR (GFR > 90 mL/min/1.73 square meters)    Stage 2 Mild CKD (GFR = 60-89 mL/min/1.73 square meters)    Stage 3A Moderate CKD (GFR = 45-59 mL/min/1.73 square meters)    Stage 3B Moderate CKD (GFR = 30-44 mL/min/1.73 square meters)    Stage 4 Severe CKD (GFR = 15-29 mL/min/1.73 square meters)    Stage 5 End Stage CKD (GFR <15 mL/min/1.73 square meters)  Note: GFR calculation is accurate only with a steady state creatinine    CBC (With Platelets) [[id number]]  (Abnormal) Collected: 07/05/24 0411    Lab Status: Final result Specimen: Blood from Arm, Right Updated: 07/05/24 0419     WBC 6.72 Thousand/uL      RBC 2.64 Million/uL      Hemoglobin 7.6 g/dL      Hematocrit 24.4 %      MCV 92 fL      MCH 28.8 pg      MCHC 31.1 g/dL      RDW 14.7 %      Platelets 229 Thousands/uL      MPV 9.0 fL     HS Troponin I 2hr [[id number]]  (Normal) Collected: 07/05/24 0348    Lab Status: Final result Specimen: Blood from Line, Venous Updated: 07/05/24 0418     hs TnI 2hr 27 ng/L      Delta 2hr hsTnI -5 ng/L     APTT [[id number]]  (Abnormal) Collected: 07/05/24 0242    Lab Status: Final result Specimen: Blood from Arm, Right Updated: 07/05/24 0342     PTT 54 seconds     Narrative:      Verified by repeat    HS Troponin 0hr (reflex protocol) [[id number]]  (Normal) Collected: 07/05/24 0025    Lab Status: Final result Specimen: Blood from Line, Venous Updated: 07/05/24 0339     hs TnI 0hr 32 ng/L     Fingerstick Glucose (POCT) [[id number]]  (Normal) Collected: 07/05/24 0027    Lab Status: Final  result Specimen: Blood Updated: 07/05/24 0028     POC Glucose 100 mg/dl     FLU/RSV/COVID - if FLU/RSV clinically relevant [[id number]]  (Normal) Collected: 07/04/24 2013    Lab Status: Final result Specimen: Nares from Nose Updated: 07/04/24 2129     SARS-CoV-2 Negative     INFLUENZA A PCR Negative     INFLUENZA B PCR Negative     RSV PCR Negative    Narrative:      FOR PEDIATRIC PATIENTS - copy/paste COVID Guidelines URL to browser: [email or web address]    SARS-CoV-2 assay is a Nucleic Acid Amplification assay intended for the  qualitative detection of nucleic acid from SARS-CoV-2 in nasopharyngeal  swabs. Results are for the presumptive identification of SARS-CoV-2 RNA.    Positive results are indicative of infection with SARS-CoV-2, the virus  causing COVID-19, but do not rule out bacterial infection or co-infection  with other viruses. Laboratories within the [place] and its  territories are required to report all positive results to the appropriate  public health authorities. Negative results do not preclude SARS-CoV-2  infection and should not be used as the sole basis for treatment or other  patient management decisions. Negative results must be combined with  clinical observations, patient history, and epidemiological information.  This test has not been FDA cleared or approved.    This test has been authorized by FDA under an Emergency Use Authorization  (EUA). This test is only authorized for the duration of time the  declaration that circumstances exist justifying the authorization of the  emergency use of an in vitro diagnostic tests for detection of SARS-CoV-2  virus and/or diagnosis of COVID-19 infection under section 564(b)(1) of  the Act, 21 U.S.C. 360bbb-3(b)(1), unless the authorization is terminated  or revoked sooner. The test has been validated but independent review by FDA  and CLIA is pending.    Test performed using [employer]  GeneXpert: This RT-PCR assay targets N2,  a region unique to SARS-CoV-2. A conserved region in the E-gene was chosen  for pan-Sarbecovirus detection which includes SARS-CoV-2.    According to CMS-2020-01-R, this platform meets the definition of high-throughput technology.    APTT [[id number]]  (Normal) Collected: 07/04/24 2013    Lab Status: Final result Specimen: Blood from Arm, Left Updated: 07/04/24 2056     PTT 32 seconds     Protime-INR [[id number]]  (Abnormal) Collected: 07/04/24 2013    Lab Status: Final result Specimen: Blood from Arm, Left Updated: 07/04/24 2056     Protime 15.7 seconds      INR 1.18    HS Troponin I 4hr [[id number]]  (Normal) Collected: 07/04/24 1940    Lab Status: Final result Specimen: Blood from Arm, Right Updated: 07/04/24 2052     hs TnI 4hr 36 ng/L      Delta 4hr hsTnI 5 ng/L     HS Troponin I 2hr [[id number]]  (Normal) Collected: 07/04/24 1727    Lab Status: Final result Specimen: Blood from Arm, Right Updated: 07/04/24 1801     hs TnI 2hr 35 ng/L      Delta 2hr hsTnI 4 ng/L     D-Dimer [[id number]]  (Abnormal) Collected: 07/04/24 1538    Lab Status: Final result Specimen: Blood from Arm, Left Updated: 07/04/24 1626     D-Dimer, Quant 4.21 ug/ml FEU     Comprehensive metabolic panel [[id number]]  (Abnormal) Collected: 07/04/24 1538    Lab Status: Final result Specimen: Blood from Arm, Left Updated: 07/04/24 1615     Sodium 135 mmol/L      Potassium 4.5 mmol/L      Chloride 96 mmol/L      CO2 27 mmol/L      ANION GAP 12 mmol/L      BUN 27 mg/dL      Creatinine 9.02 mg/dL      Glucose 89 mg/dL      Calcium 9.0 mg/dL      Corrected Calcium 9.7 mg/dL      AST 15 U/L      ALT <3 U/L      Alkaline Phosphatase 90 U/L      Total Protein 7.8 g/dL      Albumin 3.1 g/dL      Total Bilirubin 0.41 mg/dL      eGFR 6 ml/min/1.73sq m     Narrative:      National Kidney Disease Foundation guidelines for Chronic Kidney Disease (CKD):     Stage 1 with normal or high GFR (GFR > 90 mL/min/1.73 square  meters)    Stage 2 Mild CKD (GFR = 60-89 mL/min/1.73 square meters)    Stage 3A Moderate CKD (GFR = 45-59 mL/min/1.73 square meters)    Stage 3B Moderate CKD (GFR = 30-44 mL/min/1.73 square meters)    Stage 4 Severe CKD (GFR = 15-29 mL/min/1.73 square meters)    Stage 5 End Stage CKD (GFR <15 mL/min/1.73 square meters)  Note: GFR calculation is accurate only with a steady state creatinine    B-Type Natriuretic Peptide(BNP) [[id number]]  (Abnormal) Collected: 07/04/24 1538    Lab Status: Final result Specimen: Blood from Arm, Left Updated: 07/04/24 1612      pg/mL     HS Troponin 0hr (reflex protocol) [[id number]]  (Normal) Collected: 07/04/24 1538    Lab Status: Final result Specimen: Blood from Arm, Left Updated: 07/04/24 1611     hs TnI 0hr 31 ng/L     Magnesium [[id number]]  (Normal) Collected: 07/04/24 1538    Lab Status: Final result Specimen: Blood from Arm, Left Updated: 07/04/24 1604     Magnesium 1.9 mg/dL     CBC and differential [[id number]]  (Abnormal) Collected: 07/04/24 1538    Lab Status: Final result Specimen: Blood from Arm, Left Updated: 07/04/24 1550     WBC 6.92 Thousand/uL      RBC 2.27 Million/uL      Hemoglobin 6.6 g/dL      Hematocrit 21.2 %      MCV 93 fL      MCH 29.1 pg      MCHC 31.1 g/dL      RDW 14.3 %      MPV 8.9 fL      Platelets 254 Thousands/uL      nRBC 0 /100 WBCs      Segmented % 80 %      Immature Grans % 0 %      Lymphocytes % 10 %      Monocytes % 7 %      Eosinophils Relative 2 %      Basophils Relative 1 %      Absolute Neutrophils 5.54 Thousands/µL      Absolute Immature Grans 0.02 Thousand/uL      Absolute Lymphocytes 0.68 Thousands/µL      Absolute Monocytes 0.50 Thousand/µL      Eosinophils Absolute 0.14 Thousand/µL      Basophils Absolute 0.04 Thousands/µL                    CTA ED chest PE Study   Final Result by Gurjit Gibbons MD (07/04 2003)      Subsegmental right lower lobe pulmonary embolism.   No CT evidence of right heart strain.      Diffuse patchy  groundglass opacities consistent with infectious/inflammatory infiltrate.      Findings were discussed with Dr. Monique Rothman MD 8:00 p.m. on 7/4/2024            Workstation performed: [id number]         XR chest 1 view portable   Final Result by Danyell Ford MD (07/04 1707)      Mild pulmonary venous congestion.            Workstation performed: [id number]         IR biopsy kidney random    (Results Pending)         Procedures  ECG 12 Lead Documentation Only    Date/Time: 7/4/2024 6:00 PM    Performed by: Monique Rothman DO  Authorized by: Monique Rothman DO    Indications / Diagnosis:  Chest pain  ECG reviewed by me, the ED Provider: yes    Patient location:  ED  Previous ECG:     Previous ECG:  Compared to current    Similarity:  Changes noted  Interpretation:     Interpretation: abnormal    Rate:     ECG rate:  69    ECG rate assessment: normal    Rhythm:     Rhythm: sinus rhythm    Ectopy:     Ectopy: PAC    QRS:     QRS axis:  Normal  Conduction:     Conduction: normal    ST segments:     ST segments:  Normal  T waves:     T waves: normal          ED Course  ED Course as of 07/07/24 0023   Thu Jul 04, 2024   1506 SpO2: 90 %  Now on 4L NC   1553 Hemoglobin(!): 6.6  Plan to consent to transfuse   1620 Creatinine(!): 9.02  At baseline             HEART Risk Score      Flowsheet Row Most Recent Value   Heart Score Risk Calculator    History 2 Filed at: 07/04/2024 2029   ECG 1 Filed at: 07/04/2024 2029   Age 0 Filed at: 07/04/2024 2029   Risk Factors 2 Filed at: 07/04/2024 2029   Troponin 1 Filed at: 07/04/2024 2029   HEART Score 6 Filed at: 07/04/2024 2029                        SBIRT 22yo+      Flowsheet Row Most Recent Value   Initial Alcohol Screen: US AUDIT-C     1. How often do you have a drink containing alcohol? 1 Filed at: 07/05/2024 0054   2. How many drinks containing alcohol do you have on a typical day you are drinking?  0 Filed at: 07/05/2024 0054   3a. Male  UNDER 65: How often do you have five or more drinks on one occasion? 0 Filed at: 07/05/2024 0054   3b. FEMALE Any Age, or MALE 65+: How often do you have 4 or more drinks on one occassion? 0 Filed at: 07/05/2024 0054   Audit-C Score 1 Filed at: 07/05/2024 0054   ALEX: How many times in the past year have you...    Used an illegal drug or used a prescription medication for non-medical reasons? Never Filed at: 07/05/2024 0054            [facility] Criteria for PE      Flowsheet Row Most Recent Value   Wells' Criteria for PE    Clinical signs and symptoms of DVT 0 Filed at: 07/04/2024 1556   PE is primary diagnosis or equally likely 0 Filed at: 07/04/2024 1556   HR >100 0 Filed at: 07/04/2024 1556   Immobilization at least 3 days or Surgery in the previous 4 weeks 1.5 Filed at: 07/04/2024 1556   Previous, objectively diagnosed PE or DVT 0 Filed at: 07/04/2024 1556   Hemoptysis 0 Filed at: 07/04/2024 1556   Malignancy with treatment within 6 months or palliative 0 Filed at: 07/04/2024 1556   Wells' Criteria Total 1.5 Filed at: 07/04/2024 1556              Medical Decision Making  44-year-old male presenting with chest pain, worsening dyspnea since discharge from recent hospitalization.  On initial evaluation, he appeared somewhat uncomfortable, conversationally dyspneic, was hypoxic and requiring nasal cannula supplementation to maintain SpO2 above 92%.  On exam, he did not have any specific abnormal lung sounds, did have some trace lower extremity pitting edema with significant lymphedema on the left side that the patient says is at his baseline.  Initial concern given the patient's clinical history is for cardiac chest pain including STEMI/NSTEMI, anginal chest pain.  There is some concern for an element of congestive heart failure exacerbation as well given he does have a history of some diastolic dysfunction and given recent hospitalization did want to rule out acute pulmonary embolus.  Given this, broad lab workup was  done which showed troponin and relatively low risk pointing to STEMI/NSTEMI being less likely etiology of pain.  EKG was also without acute ischemic changes.  Patient did have significantly low GFR but did appear to be at his baseline.  He also had an acute on chronic anemia with hemoglobin being below threshold for transfusion.  Discussion about the risks and benefits of blood transfusion were had and the patient was amenable to transfusion of 2 units of packed red blood cells.  2 units was given based on poor response to previous transfusion on June 11 during prior hospitalization.  Anemia is likely secondary to iron deficiency and chronic disease, especially kidney disease, given no reported history of GI bleed.  Ultimately CT scan was done to rule out pulmonary embolus and did show small, subsegmental PE but this is likely not etiology of his current significant hypoxia and chest pain given small size and other comorbidities.  Overall, we did feel that the etiology of his current clinical presentation is likely multifactorial.  He is likely having some anginal chest pain due to his significant cardiac disease and acute on chronic iron deficiency anemia.  This chest pain did improve with multiple doses of sublingual nitroglycerin.  Also possible there is a component of CHF exacerbation and patient will ultimately need dialyzed as scheduled tomorrow.  Patient was started on heparin infusion for acute PE and case was discussed with [facility] who accepted the patient in stable condition for further workup and management.    Problems Addressed:  Acute on chronic diastolic congestive heart failure (HCC): acute illness or injury  Anginal chest pain at rest: acute illness or injury  Hypoxia: acute illness or injury  Pulmonary embolism (HCC): acute illness or injury    Amount and/or Complexity of Data Reviewed  Labs: ordered. Decision-making details documented in ED Course.  Radiology: ordered.    Risk  OTC  drugs.  Prescription drug management.  Decision regarding hospitalization.          Disposition  Final diagnoses:   Anginal chest pain at rest   Hypoxia   Pulmonary embolism (HCC)   Acute on chronic diastolic congestive heart failure (HCC)     Time reflects when diagnosis was documented in both MDM as applicable and the Disposition within this note       Time User Action Codes Description Comment    7/4/2024  9:48 PM RohMonique jarrell Add [I20.89] Anginal chest pain at rest     7/4/2024  9:48 PM JadielrbauMonique jimenez Add [R09.02] Hypoxia     7/4/2024  9:48 PM RohrbauMonique jimenez Add [I26.99] Pulmonary embolism (HCC)     7/4/2024  9:49 PM BaileyauMonique jimenez Add [I50.33] Acute on chronic diastolic congestive heart failure (HCC)     7/5/2024 12:11 AM Mily Martin Add [N18.6,  Z99.2] ESRD on dialysis ([facility])     7/5/2024  1:37 PM Deya Palomino Add [N18.6,  Z99.2] ESRD (end stage renal disease) on dialysis ([facility])     7/5/2024  1:37 PM Deya Palomino Remove [N18.6,  Z99.2] ESRD (end stage renal disease) on dialysis ([facility])     7/5/2024  1:38 PM Deya Palomino Add [R07.9] Chest pain, unspecified type           ED Disposition       ED Disposition   Admit    Condition   Stable    Date/Time   Thu Jul 4, 2024 9592    Comment   Case was discussed with GERRI and the patient's admission status was agreed to be Admission Status: inpatient status to the service of Dr. Caballero .               Follow-up Information    None         Current Discharge Medication List        START taking these medications    Details   apixaban (Eliquis) 5 mg Take 2 tablets (10 mg total) by mouth 2 (two) times a day for 7 days, THEN 1 tablet (5 mg total) 2 (two) times a day for 23 days.  Qty: 74 tablet, Refills: 0    Comments: Eliquis Starter Pack  Associated Diagnoses: Pulmonary embolism (HCC)           CONTINUE these medications which have NOT CHANGED    Details   aspirin (ECOTRIN LOW STRENGTH) 81 mg EC tablet Take 1 tablet (81 mg total) by mouth daily  Qty: 30 tablet,  Refills: 0    Associated Diagnoses: CAD (coronary artery disease)      atorvastatin (LIPITOR) 80 mg tablet Take 1 tablet (80 mg total) by mouth daily  Qty: 90 tablet, Refills: 3    Associated Diagnoses: Dyslipidemia      Cholecalciferol (VITAMIN D3) 1,000 units tablet Take 2 tablets (2,000 Units total) by mouth daily  Qty: 60 tablet, Refills: 0    Associated Diagnoses: Vitamin D deficiency      cyanocobalamin (VITAMIN B-12) 1000 MCG tablet Take 1 tablet (1,000 mcg total) by mouth daily  Qty: 30 tablet, Refills: 0    Associated Diagnoses: Anemia of chronic disease      ferrous sulfate 324 (65 Fe) mg Take 1 tablet (324 mg total) by mouth 2 (two) times a day before meals  Qty: 60 tablet, Refills: 0    Associated Diagnoses: Anemia of chronic disease      isosorbide mononitrate (IMDUR) 60 mg 24 hr tablet Take 1 tablet (60 mg total) by mouth daily  Qty: 30 tablet, Refills: 0    Associated Diagnoses: Coronary artery disease involving native coronary artery of native heart with angina pectoris (HCC)      metoprolol succinate (TOPROL-XL) 25 mg 24 hr tablet Take 1 tablet (25 mg total) by mouth daily  Qty: 30 tablet, Refills: 0    Associated Diagnoses: Coronary artery disease involving native coronary artery of native heart with angina pectoris (HCC)      sevelamer (RENAGEL) 800 mg tablet Take 1 tablet (800 mg total) by mouth 3 (three) times a day with meals  Qty: 90 tablet, Refills: 0    Associated Diagnoses: Renal failure      torsemide (DEMADEX) 20 mg tablet Take 2 tablets (40 mg total) by mouth daily  Qty: 60 tablet, Refills: 0    Associated Diagnoses: Other hypervolemia      Ascorbic Acid (vitamin C) 100 MG tablet Take 1 tablet (100 mg total) by mouth daily  Qty: 30 tablet, Refills: 0    Associated Diagnoses: Anemia of chronic disease      glimepiride (AMARYL) 2 mg tablet Take 1 tablet (2 mg total) by mouth daily with breakfast  Qty: 30 tablet, Refills: 3    Associated Diagnoses: Type 2 diabetes mellitus with diabetic  polyneuropathy, without long-term current use of insulin ([facility])      insulin glargine (LANTUS) 100 units/mL subcutaneous injection Inject 12 Units under the skin daily at bedtime Do not start before February 21, 2024.  Qty: 10 mL, Refills: 0    Associated Diagnoses: Type 2 diabetes mellitus with diabetic polyneuropathy, without long-term current use of insulin ([facility])      Insulin Pen Needle (BD ULTRA-FINE PEN NEEDLES) 29G X 12.7MM MISC USE 1 NEEDLE ONCE DAILY FOR INJECTION UNDER THE SKIN  Qty: 50 each, Refills: 3    Associated Diagnoses: DM type 2 with diabetic peripheral neuropathy ([facility])      nystatin (MYCOSTATIN) powder Apply 1 Application topically 2 (two) times a day  Qty: 15 g, Refills: 0    Associated Diagnoses: Rash      oxyCODONE (ROXICODONE) 10 MG TABS Take 1 tablet (10 mg total) by mouth every 4 (four) hours as needed for moderate pain or severe pain Max Daily Amount: 60 mg  Qty: 20 tablet, Refills: 0    Associated Diagnoses: Other hypervolemia           No discharge procedures on file.    PDMP Review         Value Time User    PDMP Reviewed  Yes 2/28/2024  4:53 PM Tuesday PETRA Villalobos             ED Provider  Attending physically available and evaluated Joaquin JESSIKA Velazquezrey. I managed the patient along with the ED Attending.    Electronically Signed by           Monique Rothman DO  07/07/24 0105

## 2024-07-07 NOTE — PROGRESS NOTES
NEPHROLOGY HOSPITAL PROGRESS NOTE   Joaquin Frankel 44 y.o. male MRN: [id number]  Unit/Bed#: S -01 Encounter: [id number]  Reason for Consult: CLOVIS    ASSESSMENT and PLAN:  Acute kidney injury on HD (POA)  Had CLOVIS from previous admission in June 2024 and started HD then.   HD dependent since 6/7/24.   OP Clinic: [facility]  Access: R IJ permcat.   SCr 1.22 in Feb 2024, then 2.98 in April 2024, then 6.40 in June 2024.   Serologic workup negative except for IgG kappa on SPEP and UPEP.  Cryoglobulin negative, ANCA negative, anti-dsDNA negative, anti-GBM negative, JOSE ROBERTO negative, C4 34, C3 118  Etiology not entirely clear - Possible MGRS? Pattern of disease progression is not typical for diabetic nephropathy.    Plan for renal biopsy this week.   No evidence of renal recovery and will need continued HD support.  Creatinine 10.20 today.   Plan for HD tomorrow.     Chronic kidney disease, stage III  Baseline creatinine 1.2 to 1.5.   No prior renal follow up.   Of note, he had nephrotic range proteinuria in April 2024.     Hypertension:  BP was high but better now.   Home Rx: Metoprolol succinate 25 mg OD, Torsemide 40 mg OD.   Current Rx: Amlodipine 10 mg OD, Carvedilol 6.25 mg BID, Torsemide 100 mg OD.   No changes.   Monitor BP with UF on HD.   EDW is 181 kg.      Hx of CAD and with chest pain  Cardiology consulted.  Plan to optimize volume status.  Could consider cardiac catheterization if symptoms persist.      Anemia  Hgb below goal but stable. 7.9 today.   No JOSSELYN due to acute PE.      Pulmonary embolism, respiratory failure.   On Heparin drip.   Hold off on NOAC because of needing a renal biopsy.      Nephrotic range proteinuria  Urine ACR was 5.2 gm in April 2024.   Renal biopsy as above.      IgG kappa Monoclonal gammopathy  Bone marrow biopsy done on June 12, 2024 did not reveal a plasma cell neoplasm.    Mineral bone disease  Continue Sevelamer for hyperphos.   Phos at goal.   Renal diet.     L LE  lymphedema    PLAN SUMMARY:  HD tomorrow.   Plan for renal biopsy this week.   Maintain heparin drip until renal biopsy completed.     SUBJECTIVE / 24H INTERVAL HISTORY:  Feels okay.   No new acute complaints.     OBJECTIVE:  Current Weight: Weight - Scale: (!) 175 kg (386 lb 9.6 oz)  Vitals:    07/06/24 1548 07/06/24 1610 07/06/24 2106 07/07/24 0355   BP: 138/70 131/66 157/75    BP Location:       Pulse: 69 65 (!) 53    Resp:       Temp:  98.1 °F (36.7 °C) 97.9 °F (36.6 °C)    TempSrc:       SpO2: 94% 91% 94%    Weight:    (!) 175 kg (386 lb 9.6 oz)   Height:           Intake/Output Summary (Last 24 hours) at 7/7/2024 0755  Last data filed at 7/6/2024 1501  Gross per 24 hour   Intake 780 ml   Output 300 ml   Net 480 ml     General: conscious, cooperative, no distress  Skin: dry  Eyes: pink conjunctivae  ENT: moist mucous membranes  Respiratory: equal chest expansion, clear breath sounds.  Cardiovascular: distinct heart sounds, normal rate, regular rhythm, no rub  Abdomen: soft, non tender, non distended, normal bowel sounds  Extremities: (+) L LE edema. Minimal edema of R LE.   Genitourinary: no lewis catheter.   Neuro: awake, alert.   Psych: appropriate affect    Medications:    Current Facility-Administered Medications:     acetaminophen (TYLENOL) tablet 650 mg, 650 mg, Oral, Q6H PRN, Mily Martin MD, 650 mg at 07/05/24 1251    amLODIPine (NORVASC) tablet 10 mg, 10 mg, Oral, Daily, Andrea Longoria MD    atorvastatin (LIPITOR) tablet 80 mg, 80 mg, Oral, Daily, Mily Martin MD, 80 mg at 07/06/24 0857    carvedilol (COREG) tablet 6.25 mg, 6.25 mg, Oral, BID With Meals, Andrea Longoria MD, 6.25 mg at 07/06/24 1545    ferrous sulfate tablet 325 mg, 325 mg, Oral, BID With Meals, Mily Martin MD, 325 mg at 07/06/24 1545    heparin (porcine) 25,000 units in 0.45% NaCl 250 mL infusion (premix), 3-30 Units/kg/hr (Order-Specific), Intravenous, Titrated, Gustavo Neville MD, Last Rate: 30 mL/hr at 07/07/24 0244, 24  Units/kg/hr at 07/07/24 0244    heparin (porcine) injection 10,000 Units, 10,000 Units, Intravenous, Q6H PRN, Monique Rothman DO    heparin (porcine) injection 5,000 Units, 5,000 Units, Intravenous, Q6H PRN, Monique Rothman DO, 5,000 Units at 07/05/24 1708    HYDROmorphone (DILAUDID) injection 0.5 mg, 0.5 mg, Intravenous, Q4H PRN, Mily Martin MD, 0.5 mg at 07/07/24 0600    isosorbide mononitrate (IMDUR) 24 hr tablet 60 mg, 60 mg, Oral, Daily, Mily Martin MD, 60 mg at 07/06/24 0857    labetalol (NORMODYNE) injection 10 mg, 10 mg, Intravenous, Q6H PRN, Liana Riley MD    lidocaine (LIDODERM) 5 % patch 1 patch, 1 patch, Topical, Daily, Mily Martin MD    oxyCODONE (ROXICODONE) immediate release tablet 10 mg, 10 mg, Oral, Q4H PRN, Mily Martin MD, 10 mg at 07/07/24 0401    sevelamer (RENAGEL) tablet 800 mg, 800 mg, Oral, TID With Meals, Mily Martin MD, 800 mg at 07/06/24 1545    torsemide (DEMADEX) tablet 100 mg, 100 mg, Oral, Daily, Charles Spear MD    Laboratory Results:  Results from last 7 days   Lab Units 07/07/24  0515 07/06/24  0456 07/05/24  0411 07/04/24  1538   WBC Thousand/uL 7.09 6.83 6.72 6.92   HEMOGLOBIN g/dL 7.9* 7.5* 7.6* 6.6*   HEMATOCRIT % 25.2* 24.6* 24.4* 21.2*   PLATELETS Thousands/uL 240 214 229 254   POTASSIUM mmol/L 4.2 4.2 4.5 4.5   CHLORIDE mmol/L 97 96 97 96   CO2 mmol/L 25 29 31 27   BUN mg/dL 45* 33* 32* 27*   CREATININE mg/dL 10.20* 8.43* 9.93* 9.02*   CALCIUM mg/dL 8.8 8.8 8.8 9.0   MAGNESIUM mg/dL  --   --   --  1.9   PHOSPHORUS mg/dL  --  4.6*  --   --

## 2024-07-07 NOTE — ASSESSMENT & PLAN NOTE
Patient admitted to the ED for worsening chest pain, shortness of breath, and dyspnea on exertion.  Patient was admitted for similar chest pain in early June and was found to have ESRD, started on HD MWF   D-Dimer 4.21  CTA showed subsegmental right lower lobe pulmonary embolism  Placed on heparin drip  Patient requiring 2-4 L nasal cannula.  Echo:  Left Ventricle: Left ventricular cavity size is normal. There is mild concentric hypertrophy. The left ventricular ejection fraction is 65%. Systolic function is normal. Wall motion is normal. Diastolic function is normal.  Right Ventricle: Right ventricular cavity size is normal. Systolic function is normal.  Tricuspid Valve: There is trace regurgitation.  Prior TTE study available for comparison. Prior study date: 6/4/2024. No significant changes noted compared to the prior study.     Plan-  Continue Heparin drip  Patient to oral AC following biopsy  Continue supplemental oxygen as needed  Monitor telemetry

## 2024-07-07 NOTE — PROGRESS NOTES
[facility]  Progress Note  Name: Joaquin Frankel I  MRN: [id number]  Unit/Bed#: S -01 I Date of Admission: 7/4/2024   Date of Service: 7/7/2024 I Hospital Day: 3    Assessment & Plan   * Chest pain  Assessment & Plan  Patient reports worsening substernal chest pain over several weeks.  H/o chronic angina, CAD, family history of hereditary heart disease on fathers side.   Patient was admitted for similar chest pain in early June and was found to have ESRD, started on HD MWF.  EKG on admission: Sinus Rhythm with PAC's  Troponin's negative x3  BNP (990)  D-Dimer 4.21  CTA subsegmental right lower lobe pulmonary embolism  Repeat EKG showed sinus bradycardia  Patient blood pressure elevated on admission. 194/80 currently less than 140s  Patient was saturating low 80s on room air on admission and required 3L supplemental O2, which improved saturation to 90s. O2 Sat currently 94% to 95% on room air.  Patient received 2 doses of nitroglycerin in ED and reports it did not improve his pain, but it improved his elevated blood pressure.    Etiology could be secondary to volume overload versus hypertension versus anemia versus ischemia.  Patient's chest pain has improved following hemodialysis and the improvement of high blood pressure.      Plan-  If chest pain persist despite of HD, heparin drip, and normotension patient may need cardiac catheterization at some point.  Per cardiology recs:   Continue amlodipine 5 mg daily  Continue Coreg 6.25 mg BID  Continue Imdur 60 mg daily  Continue aspirin 81 mg daily.  Continue atorvastatin 80 mg daily  Monitor on telemetry    Pulmonary embolism (HCC)  Assessment & Plan  Patient admitted to the ED for worsening chest pain, shortness of breath, and dyspnea on exertion.  Patient was admitted for similar chest pain in early June and was found to have ESRD, started on HD MWF   D-Dimer 4.21  CTA showed subsegmental right lower lobe pulmonary embolism  Placed  on heparin drip  Patient no longer on nasal cannula  Echo:  Left Ventricle: Left ventricular cavity size is normal. There is mild concentric hypertrophy. The left ventricular ejection fraction is 65%. Systolic function is normal. Wall motion is normal. Diastolic function is normal. Right Ventricle: Right ventricular cavity size is normal. Systolic function is normal. Tricuspid Valve: There is trace regurgitation. Prior TTE study available for comparison. Prior study date: 6/4/2024. No significant changes noted compared to the prior study.    Plan-  Continue Heparin drip  Patient to oral AC following biopsy  Supplemental oxygen as needed  Monitor telemetry    Type 2 diabetes mellitus (HCC)  Assessment & Plan  Lab Results   Component Value Date    HGBA1C 5.7 (H) 06/09/2024       Recent Labs     07/05/24  1743 07/05/24  2126 07/06/24  0716 07/06/24  1137   POCGLU 114 106 78 95       Blood Sugar Average: Last 72 hrs:  (P) 98.6      Anemia  Assessment & Plan  Patient has history of anemia of chronic disease, ESRD  CBC on admission showed hgb 6.6/ hct 21.2  Received 2 units packed RBC's in ED  Hbg 7.6 today after transfusion  Hematology following with outpatient f/u scheduled 7/11/2024.   On oral iron twice daily  No active bleeding, on heparin for PE, will switch to Eliquis eventually.    Plan:  Continue to monitor hemoglobin levels  transfuse for Hgb <7.0    ESRD (end stage renal disease) on dialysis (Formerly [facility])  Assessment & Plan  Lab Results   Component Value Date    EGFR 6 07/06/2024    EGFR 5 07/05/2024    EGFR 6 07/04/2024    CREATININE 8.43 (H) 07/06/2024    CREATININE 9.93 (H) 07/05/2024    CREATININE 9.02 (H) 07/04/2024     Patient ESRD on Dialysis 3 times a week, MWF  Elevated , c/o of orthopnea   Etiology of CLOVIS: suspected MGRS  Etiology of CKD: Suspected due to diabetic nephropathy, hypertensive nephrosclerosis, obesity related renal dysfunction  Previous baseline creatinine 1.3-1.6 w/ prior episodes of  CLOVIS  serologies: ANCA, anti GBM, JOSE ROBERTO, dsDNA negative. C 3,4 and K/L ration normal.  SPEP/UEPEP +IgG Kappa monoclonal band, bet-2 microglobulin elevated  Bone marrow biopsy 6/12/2024: negative for plasma cell neoplasm. given negative bone marrow biopsy, renal biopsy would be beneficial but precluded due to acute PE and need for uninterrupted anticoagulation therapy.  CRRT initiated 6/7/2024.  Peak creatinine 10.4  Last treatment 7/5/2024 with UF 3.5 L  EDW: 181 kg  electrolytes and acid/base stable    Plan:  Nephrology consulted, recommendations appreciated   Tentatively plan for a IR guided renal biopsy  Hold aspirin for 5 days prior to biopsy, earliest possible date is this Thursday (7/11/2024). Heparin will need to be held Thursday morning.   Next HD treatment 7/8/2024.  Will plan to continue regular Monday, Wednesday, Friday schedule during hospitalization.  Continue torsemide 100 mg daily    ANGY (obstructive sleep apnea)  Assessment & Plan  Overnight pulse oximetry obtain: highest O2 sat 99%, his lowest O2 sat 73%, with a mean of 91%. Patient had 120 desaturation events <89%.  He spent an 1 hour and 15 minutes in desaturation (~15.82%) and and 1 hour and 34 minutes with desaturation <89% (~19.77%).  Counseled patient on importance of maintaining oxygen saturation as oxygen deprivation can lead to hypertension, heart failure, coronary artery disease, pulmonary hypertension, etc.    Patient was advised of need to wear oxygen at night upon discharge. He declines stating he would like to wait for formal evaluation via sleep study as an outpatient.    Chronic acquired lymphedema  Assessment & Plan  Chronic LLE lymphedema x 3 years after surgical resection of LLE cyst    Plan:  Advised compression and elevation of lower extremity for symptomatic edema relief    Hypertension  Assessment & Plan  History of HTN, recent medication change after dx of ESRD  Workup at that time notable for IgG kappa monoclonal gammopathy and  s/p bone marrow biopsy on 6/12/2023 to assess for multiple myeloma. Bone marrow biopsy with no evidence of plasma cell neoplasm.   HD on MWF schedule  Presented to ED with uncontrolled /80  Endorsed headaches, and blurry vision in his left eye in the ED that has since resolved.    Plan:  Continue amlodipine 10 mg daily, carvedilol 6.25 mg twice daily, Imdur 60mg once daily, torsemide 100 mg daily  Hold carvedilol if HR <50  Nephrology consulted, recommendations appreciated  Continue UF with dialysis as BP tolerates       Acute respiratory failure with hypoxia (HCC)-resolved as of 7/6/2024  Assessment & Plan  Patient O2 saturation on admission was 83% on RA  Was placed on 3/L Nasal canula FiO2 32%  CTA showed right lower lobe subsegmental PE  Was placed on heparin  Patient continues to require 4L nasal cannula.    Plan:  Supplemental oxygen as needed  Continue heparin for PE  Incentive Spirometry           VTE Pharmacologic Prophylaxis: VTE Score: 7 High Risk (Score >/= 5) - Pharmacological DVT Prophylaxis Ordered: heparin. Sequential Compression Devices Ordered.    Mobility:   Basic Mobility Inpatient Raw Score: 24  JH-HLM Goal: 8: Walk 250 feet or more  JH-HLM Achieved: 7: Walk 25 feet or more  JH-HLM Goal NOT achieved. Continue with multidisciplinary rounding and encourage appropriate mobility to improve upon JH-HLM goals.    Patient Centered Rounds: I performed bedside rounds with nursing staff today.   Discussions with Specialists or Other Care Team Provider: nephrology and cardiology    Education and Discussions with Family / Patient: Patient declined call to .     Current Length of Stay: 3 day(s)  Current Patient Status: Inpatient   Discharge Plan: Anticipate discharge in 24-48 hrs to home.    Code Status: Level 1 - Full Code    Subjective:   Patient was seen and evaluated at bedside this morning.  He reported some back pain at 1 AM  and received Dilaudid with relief. This morning, he is  feeling much better. He states his chest pain and shortness of breath have markedly improved.  He was able to take a shower this morning with minimal symptoms. His oxygen saturation was 94-95% on room air.     Objective:     Vitals:   Temp (24hrs), Av °F (36.7 °C), Min:97.9 °F (36.6 °C), Max:98.1 °F (36.7 °C)    Temp:  [97.9 °F (36.6 °C)-98.1 °F (36.7 °C)] 98 °F (36.7 °C)  HR:  [53-93] 53  Resp:  [17] 17  BP: (131-157)/(66-83) 157/83  SpO2:  [91 %-94 %] 94 %  Body mass index is 60.55 kg/m².     Input and Output Summary (last 24 hours):     Intake/Output Summary (Last 24 hours) at 2024 1408  Last data filed at 2024 0900  Gross per 24 hour   Intake 960 ml   Output 215 ml   Net 745 ml       Physical Exam:   Physical Exam  Constitutional:       Appearance: Normal appearance. He is obese. He is not ill-appearing or toxic-appearing.   HENT:      Head: Normocephalic and atraumatic.      Nose: Nose normal.      Mouth/Throat:      Mouth: Mucous membranes are moist.      Pharynx: Oropharynx is clear.   Eyes:      Extraocular Movements: Extraocular movements intact.      Conjunctiva/sclera: Conjunctivae normal.   Cardiovascular:      Rate and Rhythm: Regular rhythm. Bradycardia present.      Heart sounds: Normal heart sounds.   Pulmonary:      Effort: Pulmonary effort is normal.      Breath sounds: Normal breath sounds. No wheezing.   Abdominal:      General: Bowel sounds are normal.      Palpations: Abdomen is soft.      Tenderness: There is no abdominal tenderness.      Comments: Obese abdomen   Musculoskeletal:      Cervical back: Neck supple.      Right lower leg: Edema (violaceous skin changes right lower extremity, punctate wounds healing over right shin) present.      Left lower leg: Edema (chronic changes, lipodermatosclerosis) present.      Comments: LLE significantly larger than RLE due to chronic lymphedema.  Old healed surgical incision noted to left calf    Skin:     General: Skin is warm and dry.       Comments: R IJV PermCath site clear.  Dressing intact   Neurological:      General: No focal deficit present.      Mental Status: He is alert and oriented to person, place, and time. Mental status is at baseline.   Psychiatric:         Mood and Affect: Mood normal.          Additional Data:     Labs:  Results from last 7 days   Lab Units 07/07/24  0515 07/06/24  0456   WBC Thousand/uL 7.09 6.83   HEMOGLOBIN g/dL 7.9* 7.5*   HEMATOCRIT % 25.2* 24.6*   PLATELETS Thousands/uL 240 214   SEGS PCT %  --  66   LYMPHO PCT %  --  16   MONO PCT %  --  9   EOS PCT %  --  7*     Results from last 7 days   Lab Units 07/07/24  0515 07/05/24  0411 07/04/24  1538   SODIUM mmol/L 133*   < > 135   POTASSIUM mmol/L 4.2   < > 4.5   CHLORIDE mmol/L 97   < > 96   CO2 mmol/L 25   < > 27   BUN mg/dL 45*   < > 27*   CREATININE mg/dL 10.20*   < > 9.02*   ANION GAP mmol/L 11   < > 12   CALCIUM mg/dL 8.8   < > 9.0   ALBUMIN g/dL  --   --  3.1*   TOTAL BILIRUBIN mg/dL  --   --  0.41   ALK PHOS U/L  --   --  90   ALT U/L  --   --  <3*   AST U/L  --   --  15   GLUCOSE RANDOM mg/dL 82   < > 89    < > = values in this interval not displayed.     Results from last 7 days   Lab Units 07/04/24  2013   INR  1.18     Results from last 7 days   Lab Units 07/07/24  1119 07/07/24  0718 07/06/24  2104 07/06/24  1610 07/06/24  1137 07/06/24  0716 07/05/24  2126 07/05/24  1743 07/05/24  0027   POC GLUCOSE mg/dl 86 80 115 110 95 78 106 114 100               Lines/Drains:  Invasive Devices       Peripheral Intravenous Line  Duration             Peripheral IV 07/04/24 Dorsal (posterior);Left Forearm 2 days              Hemodialysis Catheter  Duration             HD Permanent Double Catheter 22 days                      Telemetry:  Telemetry Orders (From admission, onward)               24 Hour Telemetry Monitoring  Continuous x 24 Hours (Telem)        Question:  Reason for 24 Hour Telemetry  Answer:  Pulmonary Embolism                     Telemetry Reviewed:  Normal Sinus Rhythm and Sinus Bradycardia Indication for Continued Telemetry Use: PE             Imaging: Reviewed radiology reports from this admission including: chest xray, chest CT scan, and ECHO    Recent Cultures (last 7 days):         Last 24 Hours Medication List:   Current Facility-Administered Medications   Medication Dose Route Frequency Provider Last Rate    acetaminophen  650 mg Oral Q6H PRN Mily Martin MD      amLODIPine  10 mg Oral Daily Andrea Longoria MD      atorvastatin  80 mg Oral Daily Mily Martin MD      carvedilol  6.25 mg Oral BID With Meals Andrea Longoria MD      ferrous sulfate  325 mg Oral BID With Meals Mily Martin MD      heparin (porcine)  3-30 Units/kg/hr (Order-Specific) Intravenous Titrated Gustavo Neville MD 24 Units/kg/hr (07/07/24 1125)    heparin (porcine)  10,000 Units Intravenous Q6H PRN Monique Rothman DO      heparin (porcine)  5,000 Units Intravenous Q6H PRN Monique Rothman DO      HYDROmorphone  0.5 mg Intravenous Q4H PRN Mily Martin MD      isosorbide mononitrate  60 mg Oral Daily Mily Martin MD      labetalol  10 mg Intravenous Q6H PRN Liana Riley MD      lidocaine  1 patch Topical Daily Mily Martin MD      oxyCODONE  10 mg Oral Q4H PRN Mily Martin MD      sevelamer  800 mg Oral TID With Meals Mily Martin MD      torsemide  100 mg Oral Daily Charles Spear MD          Today, Patient Was Seen By: Deya Palomino MD    **Please Note: This note may have been constructed using a voice recognition system.**

## 2024-07-07 NOTE — ASSESSMENT & PLAN NOTE
Chest pain  Assessment & Plan  Patient reports worsening substernal chest pain over several weeks.  H/o chronic angina, CAD, family history of hereditary heart disease on fathers side.   Patient was admitted for similar chest pain in early June and was found to have ESRD, started on HD MWF.  EKG on admission: Sinus Rhythm with PAC's  Troponin's negative x3  BNP (990)  D-Dimer 4.21  CTA subsegmental right lower lobe pulmonary embolism  Repeat EKG showed sinus bradycardia  Patient blood pressure elevated on admission. 194/80 currently less than 140s  Patient was saturating low 80s on room air on admission and required 3L supplemental O2, which improved saturation to 90s.  Patient received 2 doses of nitroglycerin in ED and reports it did not improve his pain, but it improved his elevated blood pressure.     Etiology could be secondary to volume overload versus hypertension versus anemia versus ischemia.  Patient's chest pain has improved following hemodialysis and the improvement of high blood pressure.        Plan-  If chest pain persist despite of HD, heparin drip, and normotension patient may need cardiac catheterization at some point.  Per cardiology recs:   Continue amlodipine 5 mg daily  Continue Coreg 6.25 mg BID  Continue Imdur 60 mg daily  Continue aspirin 81 mg daily.  Continue atorvastatin 80 mg daily  Monitor on telemetry

## 2024-07-07 NOTE — ASSESSMENT & PLAN NOTE
Lab Results   Component Value Date    EGFR 5 07/07/2024    EGFR 6 07/06/2024    EGFR 5 07/05/2024    CREATININE 10.20 (H) 07/07/2024    CREATININE 8.43 (H) 07/06/2024    CREATININE 9.93 (H) 07/05/2024     Patient ESRD on Dialysis 3 times a week, MWF  Elevated , c/o of orthopnea   Etiology of CLOVIS: suspected MGRS  Etiology of CKD: Suspected due to diabetic nephropathy, hypertensive nephrosclerosis, obesity related renal dysfunction  Previous baseline creatinine 1.3-1.6 w/ prior episodes of CLOVIS  serologies: ANCA, anti GBM, JOSE ROBERTO, dsDNA negative. C 3,4 and K/L ration normal.  SPEP/UEPEP +IgG Kappa monoclonal band, bet-2 microglobulin elevated  Bone marrow biopsy 6/12/2024: negative for plasma cell neoplasm. given negative bone marrow biopsy, renal biopsy would be beneficial but precluded due to acute PE and need for uninterrupted anticoagulation therapy.  CRRT initiated 6/7/2024.  Peak creatinine 10.4  last treatment 7/3/2024 with UF 2.2L  EDW: 181 kg  electrolytes and acid/base stable     Plan:  Nephrology consulted, recommendations appreciated          Tentatively plan for a IR guided renal biopsy  Hold aspirin for 5 days prior to biopsy, possible earliest this Thursday heparin will need to be held Thursday morning. 7/11/24  Next HD treatment 7/8/2024.  Will plan to continue regular Monday, Wednesday, Friday schedule during hospitalization.

## 2024-07-07 NOTE — PROGRESS NOTES
General Cardiology Progress Note   Joaquin Frankel 44 y.o. male MRN: [id number]  Unit/Bed#: S -01 Encounter: [id number]      Assessment:  Principal Problem:    Chest pain  Active Problems:    Hypertension    Chronic acquired lymphedema    Pulmonary embolism (HCC)    ESRD (end stage renal disease) on dialysis (HCC)    Anemia    Type 2 diabetes mellitus (HCC)      Impression:    Chest pain-multifactorial, in the context of heparinization for his pulmonary embolism, dialysis with significant volume removal, treatment of his severe hypertensive urgency, he has had significant improvement in his chest discomfort and he had no objective findings of ACS on admission.  Super morbid obesity-BMI 59  Left lower extremity lymphedema  Hypertensive urgency  Blood pressures have trended down quite nicely, and are mostly 114/58 to 146/67 in the last 8 hours  Subsegmental pulmonary embolism, following recent hospitalization  Coronary artery disease (premature)-with recent stress testing that was notable for considerable artifact but no obvious ischemia  Uncontrolled hypercholesterolemia  Anemia  Significant since 6/24, prior to that he had hemoglobin levels around 10, has been running 6.7-7.9 over the last month and a half, highly likely to be implicated in his symptoms of dyspnea on exertion  End-stage renal disease-new start hemodialysis  4 L removed by dialysis yesterday    Plan:    Chest pains are likely multifactorial, severe anemia, came in with uncontrolled hypertension, volume overloaded in the context of end-stage renal disease, his weight is down to 386,, subsegmental PE  Glad to hear his chest pains are markedly improved  Will not plan any further cardiovascular testing  I would advise aggressive means to improve his hemoglobin, currently running in the sevens.  He had no objective ischemic findings on this admission, by EKG, troponins, and opted for reevaluation of symptoms after medical  "stabilization.      Subjective:   Patient seen and examined.      Much improved chest discomfort, took a shower with minimal symptoms this morning.  Has not walked much, plans to today      Review of Systems   Constitutional: Negative for diaphoresis, fever, malaise/fatigue and night sweats.   Cardiovascular:  Positive for chest pain. Negative for dyspnea on exertion, leg swelling, orthopnea, palpitations and syncope.   Respiratory:  Positive for shortness of breath. Negative for cough, sputum production and wheezing.    Skin:  Negative for itching and rash.   Gastrointestinal:  Negative for abdominal pain, change in bowel habit and diarrhea.   Neurological:  Negative for dizziness, focal weakness, light-headedness and weakness.       Objective   Vitals: Blood pressure 157/75, pulse (!) 53, temperature 97.9 °F (36.6 °C), resp. rate 16, height 5' 7\" (1.702 m), weight (!) 175 kg (386 lb 9.6 oz), SpO2 94%., Body mass index is 60.55 kg/m²., I/O last 3 completed shifts:  In: 1740 [P.O.:1740]  Out: 300 [Urine:300]  No intake/output data recorded.  Wt Readings from Last 3 Encounters:   07/07/24 (!) 175 kg (386 lb 9.6 oz)   06/26/24 (!) 182 kg (400 lb 9.2 oz)   05/30/24 (!) 187 kg (413 lb)       Intake/Output Summary (Last 24 hours) at 7/7/2024 0732  Last data filed at 7/6/2024 1501  Gross per 24 hour   Intake 780 ml   Output 300 ml   Net 480 ml     I/O last 3 completed shifts:  In: 1740 [P.O.:1740]  Out: 300 [Urine:300]        Physical Exam  Constitutional:       General: He is not in acute distress.     Appearance: He is obese. He is not diaphoretic.   HENT:      Head: Normocephalic and atraumatic.   Neck:      Vascular: No JVD.   Cardiovascular:      Rate and Rhythm: Normal rate and regular rhythm.      Heart sounds: Normal heart sounds. No murmur heard.  Pulmonary:      Effort: Pulmonary effort is normal. No respiratory distress.      Breath sounds: Normal breath sounds. No wheezing or rales.   Abdominal:      General: " Bowel sounds are normal. There is no distension.      Palpations: Abdomen is soft.      Tenderness: There is no abdominal tenderness.   Musculoskeletal:      Cervical back: Normal range of motion and neck supple.      Right lower leg: No edema.      Left lower leg: Edema (Significant lymphedema) present.   Skin:     General: Skin is warm and dry.   Neurological:      Mental Status: He is alert and oriented to person, place, and time.         Meds/Allergies   Allergies   Allergen Reactions    Keflex [Cephalexin] Facial Swelling and Lip Swelling    Amoxicillin Hives     childhood       Current Facility-Administered Medications:     acetaminophen (TYLENOL) tablet 650 mg, 650 mg, Oral, Q6H PRN, Mily Martin MD, 650 mg at 07/05/24 1251    amLODIPine (NORVASC) tablet 10 mg, 10 mg, Oral, Daily, Andrea Longoria MD    atorvastatin (LIPITOR) tablet 80 mg, 80 mg, Oral, Daily, Mily Martin MD, 80 mg at 07/06/24 0857    carvedilol (COREG) tablet 6.25 mg, 6.25 mg, Oral, BID With Meals, Andrea Longoria MD, 6.25 mg at 07/06/24 1545    ferrous sulfate tablet 325 mg, 325 mg, Oral, BID With Meals, Mily Martin MD, 325 mg at 07/06/24 1545    heparin (porcine) 25,000 units in 0.45% NaCl 250 mL infusion (premix), 3-30 Units/kg/hr (Order-Specific), Intravenous, Titrated, Gustavo Neville MD, Last Rate: 30 mL/hr at 07/07/24 0244, 24 Units/kg/hr at 07/07/24 0244    heparin (porcine) injection 10,000 Units, 10,000 Units, Intravenous, Q6H PRN, Monique Rothman DO    heparin (porcine) injection 5,000 Units, 5,000 Units, Intravenous, Q6H PRN, Monique Rothman DO, 5,000 Units at 07/05/24 1708    HYDROmorphone (DILAUDID) injection 0.5 mg, 0.5 mg, Intravenous, Q4H PRN, Mily Martin MD, 0.5 mg at 07/07/24 0600    isosorbide mononitrate (IMDUR) 24 hr tablet 60 mg, 60 mg, Oral, Daily, Mily Martin MD, 60 mg at 07/06/24 0857    labetalol (NORMODYNE) injection 10 mg, 10 mg, Intravenous, Q6H PRN, Liana Bustillo  "Murali Riley MD    lidocaine (LIDODERM) 5 % patch 1 patch, 1 patch, Topical, Daily, Mily Martin MD    oxyCODONE (ROXICODONE) immediate release tablet 10 mg, 10 mg, Oral, Q4H PRN, Mily Martin MD, 10 mg at 07/07/24 0401    sevelamer (RENAGEL) tablet 800 mg, 800 mg, Oral, TID With Meals, Mily Martin MD, 800 mg at 07/06/24 1545    torsemide (DEMADEX) tablet 100 mg, 100 mg, Oral, Daily, Charles Spear MD    Laboratory Results:        CBC with diff:   Results from last 7 days   Lab Units 07/07/24 0515 07/06/24  0456 07/05/24 0411 07/04/24  1538   WBC Thousand/uL 7.09 6.83 6.72 6.92   HEMOGLOBIN g/dL 7.9* 7.5* 7.6* 6.6*   HEMATOCRIT % 25.2* 24.6* 24.4* 21.2*   MCV fL 93 94 92 93   PLATELETS Thousands/uL 240 214 229 254   RBC Million/uL 2.70* 2.63* 2.64* 2.27*   MCH pg 29.3 28.5 28.8 29.1   MCHC g/dL 31.3* 30.5* 31.1* 31.1*   RDW % 14.1 14.5 14.7 14.3   MPV fL 9.2 9.2 9.0 8.9   NRBC AUTO /100 WBCs  --  0  --  0       CMP:  Results from last 7 days   Lab Units 07/07/24  0515 07/06/24  0456 07/05/24 0411 07/04/24  1538   SODIUM mmol/L 133* 136 137 135   POTASSIUM mmol/L 4.2 4.2 4.5 4.5   CHLORIDE mmol/L 97 96 97 96   CO2 mmol/L 25 29 31 27   BUN mg/dL 45* 33* 32* 27*   CREATININE mg/dL 10.20* 8.43* 9.93* 9.02*   CALCIUM mg/dL 8.8 8.8 8.8 9.0   AST U/L  --   --   --  15   ALT U/L  --   --   --  <3*   ALK PHOS U/L  --   --   --  90   EGFR ml/min/1.73sq m 5 6 5 6       BMP:  Results from last 7 days   Lab Units 07/07/24  0515 07/06/24  0456 07/05/24  0411 07/04/24  1538   SODIUM mmol/L 133* 136 137 135   POTASSIUM mmol/L 4.2 4.2 4.5 4.5   CHLORIDE mmol/L 97 96 97 96   CO2 mmol/L 25 29 31 27   BUN mg/dL 45* 33* 32* 27*   CREATININE mg/dL 10.20* 8.43* 9.93* 9.02*   CALCIUM mg/dL 8.8 8.8 8.8 9.0       NT-proBNP: No results for input(s): \"NTBNP\" in the last 72 hours.     Magnesium:   Results from last 7 days   Lab Units 07/04/24  1538   MAGNESIUM mg/dL 1.9       Coags:   Results from last 7 days   Lab Units " "07/07/24  0515 07/06/24  0456 07/05/24  2310 07/05/24  1635 07/05/24  0955 07/05/24  0242 07/04/24 2013   PTT seconds 60* 65* 65* 58* 43* 54* 32   INR   --   --   --   --   --   --  1.18       TSH:        Hemoglobin A1C )      Lipid Profile:   Lab Results   Component Value Date    CHOL 147 12/29/2016     Lab Results   Component Value Date    HDL 28 (L) 04/29/2024    HDL 31 (L) 04/04/2023    HDL 29 (L) 03/30/2022     Lab Results   Component Value Date    LDLCALC 121 (H) 04/29/2024    LDLCALC 84 04/04/2023    LDLCALC 63 03/30/2022     No results found for: \"LDLDIRECT\"  Lab Results   Component Value Date    TRIG 198 (H) 04/29/2024    TRIG 131 04/04/2023    TRIG 83 03/30/2022       Cardiac testing:   EKG personally reviewed by Andrea Longoria MD.     Cath:  ECHO:  Stress TEST:  Other:        Andrea Longoria MD    Portions of the record may have been created with voice recognition software.  Occasional wrong word or \"sound a like\" substitutions may have occurred due to the inherent limitations of voice recognition software.  Read the chart carefully and recognize, using context, where substitutions have occurred.        "

## 2024-07-08 ENCOUNTER — APPOINTMENT (INPATIENT)
Dept: DIALYSIS | Facility: HOSPITAL | Age: 44
DRG: 175 | End: 2024-07-08
Payer: COMMERCIAL

## 2024-07-08 PROBLEM — D64.9 ANEMIA: Chronic | Status: ACTIVE | Noted: 2024-07-05

## 2024-07-08 PROBLEM — R07.9 CHEST PAIN: Chronic | Status: ACTIVE | Noted: 2021-12-02

## 2024-07-08 LAB
ABO GROUP BLD: NORMAL
ANION GAP SERPL CALCULATED.3IONS-SCNC: 13 MMOL/L (ref 4–13)
APTT PPP: 48 SECONDS (ref 23–37)
APTT PPP: 58 SECONDS (ref 23–37)
APTT PPP: 66 SECONDS (ref 23–37)
ATRIAL RATE: 64 BPM
BASOPHILS # BLD AUTO: 0.04 THOUSANDS/ÂΜL (ref 0–0.1)
BASOPHILS NFR BLD AUTO: 1 % (ref 0–1)
BLD GP AB SCN SERPL QL: NEGATIVE
BUN SERPL-MCNC: 51 MG/DL (ref 5–25)
CALCIUM SERPL-MCNC: 8.9 MG/DL (ref 8.4–10.2)
CHLORIDE SERPL-SCNC: 95 MMOL/L (ref 96–108)
CO2 SERPL-SCNC: 25 MMOL/L (ref 21–32)
CREAT SERPL-MCNC: 11.13 MG/DL (ref 0.6–1.3)
EOSINOPHIL # BLD AUTO: 0.38 THOUSAND/ÂΜL (ref 0–0.61)
EOSINOPHIL NFR BLD AUTO: 6 % (ref 0–6)
ERYTHROCYTE [DISTWIDTH] IN BLOOD BY AUTOMATED COUNT: 14.2 % (ref 11.6–15.1)
GFR SERPL CREATININE-BSD FRML MDRD: 4 ML/MIN/1.73SQ M
GLUCOSE SERPL-MCNC: 103 MG/DL (ref 65–140)
GLUCOSE SERPL-MCNC: 112 MG/DL (ref 65–140)
GLUCOSE SERPL-MCNC: 68 MG/DL (ref 65–140)
GLUCOSE SERPL-MCNC: 76 MG/DL (ref 65–140)
GLUCOSE SERPL-MCNC: 99 MG/DL (ref 65–140)
HBV CORE AB SER QL: NORMAL
HBV CORE IGM SER QL: NORMAL
HBV SURFACE AB SER-ACNC: <3 MIU/ML
HBV SURFACE AG SER QL: NORMAL
HCT VFR BLD AUTO: 25 % (ref 36.5–49.3)
HCT VFR BLD AUTO: 27.1 % (ref 36.5–49.3)
HCV AB SER QL: NORMAL
HGB BLD-MCNC: 7.8 G/DL (ref 12–17)
HGB BLD-MCNC: 8.8 G/DL (ref 12–17)
IMM GRANULOCYTES # BLD AUTO: 0.03 THOUSAND/UL (ref 0–0.2)
IMM GRANULOCYTES NFR BLD AUTO: 0 % (ref 0–2)
LYMPHOCYTES # BLD AUTO: 0.97 THOUSANDS/ÂΜL (ref 0.6–4.47)
LYMPHOCYTES NFR BLD AUTO: 14 % (ref 14–44)
MCH RBC QN AUTO: 28.9 PG (ref 26.8–34.3)
MCHC RBC AUTO-ENTMCNC: 31.2 G/DL (ref 31.4–37.4)
MCV RBC AUTO: 93 FL (ref 82–98)
MONOCYTES # BLD AUTO: 0.54 THOUSAND/ÂΜL (ref 0.17–1.22)
MONOCYTES NFR BLD AUTO: 8 % (ref 4–12)
NEUTROPHILS # BLD AUTO: 4.77 THOUSANDS/ÂΜL (ref 1.85–7.62)
NEUTS SEG NFR BLD AUTO: 71 % (ref 43–75)
NRBC BLD AUTO-RTO: 0 /100 WBCS
P AXIS: 66 DEGREES
PHOSPHATE SERPL-MCNC: 5.8 MG/DL (ref 2.7–4.5)
PLATELET # BLD AUTO: 267 THOUSANDS/UL (ref 149–390)
PMV BLD AUTO: 9.4 FL (ref 8.9–12.7)
POTASSIUM SERPL-SCNC: 4.5 MMOL/L (ref 3.5–5.3)
PR INTERVAL: 164 MS
QRS AXIS: -6 DEGREES
QRSD INTERVAL: 86 MS
QT INTERVAL: 450 MS
QTC INTERVAL: 464 MS
RBC # BLD AUTO: 2.7 MILLION/UL (ref 3.88–5.62)
RH BLD: POSITIVE
SODIUM SERPL-SCNC: 133 MMOL/L (ref 135–147)
SPECIMEN EXPIRATION DATE: NORMAL
T WAVE AXIS: 76 DEGREES
VENTRICULAR RATE: 64 BPM
WBC # BLD AUTO: 6.73 THOUSAND/UL (ref 4.31–10.16)

## 2024-07-08 PROCEDURE — 85014 HEMATOCRIT: CPT

## 2024-07-08 PROCEDURE — 86850 RBC ANTIBODY SCREEN: CPT

## 2024-07-08 PROCEDURE — P9016 RBC LEUKOCYTES REDUCED: HCPCS

## 2024-07-08 PROCEDURE — 99232 SBSQ HOSP IP/OBS MODERATE 35: CPT | Performed by: INTERNAL MEDICINE

## 2024-07-08 PROCEDURE — 86901 BLOOD TYPING SEROLOGIC RH(D): CPT

## 2024-07-08 PROCEDURE — 85025 COMPLETE CBC W/AUTO DIFF WBC: CPT

## 2024-07-08 PROCEDURE — 90935 HEMODIALYSIS ONE EVALUATION: CPT | Performed by: INTERNAL MEDICINE

## 2024-07-08 PROCEDURE — 86900 BLOOD TYPING SEROLOGIC ABO: CPT

## 2024-07-08 PROCEDURE — 80048 BASIC METABOLIC PNL TOTAL CA: CPT | Performed by: INTERNAL MEDICINE

## 2024-07-08 PROCEDURE — 30233N1 TRANSFUSION OF NONAUTOLOGOUS RED BLOOD CELLS INTO PERIPHERAL VEIN, PERCUTANEOUS APPROACH: ICD-10-PCS | Performed by: HOSPITALIST

## 2024-07-08 PROCEDURE — 85018 HEMOGLOBIN: CPT

## 2024-07-08 PROCEDURE — 5A1D70Z PERFORMANCE OF URINARY FILTRATION, INTERMITTENT, LESS THAN 6 HOURS PER DAY: ICD-10-PCS | Performed by: INTERNAL MEDICINE

## 2024-07-08 PROCEDURE — 84100 ASSAY OF PHOSPHORUS: CPT

## 2024-07-08 PROCEDURE — 86923 COMPATIBILITY TEST ELECTRIC: CPT

## 2024-07-08 PROCEDURE — 85730 THROMBOPLASTIN TIME PARTIAL: CPT | Performed by: INTERNAL MEDICINE

## 2024-07-08 PROCEDURE — 82948 REAGENT STRIP/BLOOD GLUCOSE: CPT

## 2024-07-08 RX ORDER — OXYCODONE HYDROCHLORIDE 5 MG/1
5 TABLET ORAL EVERY 4 HOURS PRN
Status: DISCONTINUED | OUTPATIENT
Start: 2024-07-08 | End: 2024-07-19

## 2024-07-08 RX ORDER — OXYCODONE HYDROCHLORIDE 10 MG/1
10 TABLET ORAL EVERY 4 HOURS PRN
Status: DISCONTINUED | OUTPATIENT
Start: 2024-07-08 | End: 2024-07-19

## 2024-07-08 RX ADMIN — FERROUS SULFATE TAB 325 MG (65 MG ELEMENTAL FE) 325 MG: 325 (65 FE) TAB at 07:54

## 2024-07-08 RX ADMIN — CARVEDILOL 6.25 MG: 6.25 TABLET, FILM COATED ORAL at 17:45

## 2024-07-08 RX ADMIN — AMLODIPINE BESYLATE 10 MG: 10 TABLET ORAL at 08:57

## 2024-07-08 RX ADMIN — SEVELAMER HYDROCHLORIDE 800 MG: 800 TABLET ORAL at 07:54

## 2024-07-08 RX ADMIN — OXYCODONE HYDROCHLORIDE 5 MG: 5 TABLET ORAL at 13:10

## 2024-07-08 RX ADMIN — HEPARIN SODIUM 5000 UNITS: 1000 INJECTION INTRAVENOUS; SUBCUTANEOUS at 07:48

## 2024-07-08 RX ADMIN — OXYCODONE HYDROCHLORIDE 5 MG: 5 TABLET ORAL at 17:55

## 2024-07-08 RX ADMIN — TORSEMIDE 100 MG: 100 TABLET ORAL at 09:01

## 2024-07-08 RX ADMIN — HEPARIN SODIUM 26 UNITS/KG/HR: 10000 INJECTION, SOLUTION INTRAVENOUS at 19:00

## 2024-07-08 RX ADMIN — ATORVASTATIN CALCIUM 80 MG: 40 TABLET, FILM COATED ORAL at 09:01

## 2024-07-08 RX ADMIN — FERROUS SULFATE TAB 325 MG (65 MG ELEMENTAL FE) 325 MG: 325 (65 FE) TAB at 17:45

## 2024-07-08 RX ADMIN — SEVELAMER HYDROCHLORIDE 800 MG: 800 TABLET ORAL at 17:45

## 2024-07-08 RX ADMIN — CARVEDILOL 6.25 MG: 6.25 TABLET, FILM COATED ORAL at 07:54

## 2024-07-08 RX ADMIN — HEPARIN SODIUM 26 UNITS/KG/HR: 10000 INJECTION, SOLUTION INTRAVENOUS at 11:11

## 2024-07-08 RX ADMIN — HEPARIN SODIUM 5000 UNITS: 1000 INJECTION INTRAVENOUS; SUBCUTANEOUS at 21:30

## 2024-07-08 RX ADMIN — OXYCODONE HYDROCHLORIDE 10 MG: 10 TABLET ORAL at 07:55

## 2024-07-08 RX ADMIN — SEVELAMER HYDROCHLORIDE 800 MG: 800 TABLET ORAL at 11:11

## 2024-07-08 RX ADMIN — OXYCODONE HYDROCHLORIDE 10 MG: 10 TABLET ORAL at 20:15

## 2024-07-08 RX ADMIN — OXYCODONE HYDROCHLORIDE 5 MG: 5 TABLET ORAL at 23:49

## 2024-07-08 RX ADMIN — ISOSORBIDE MONONITRATE 60 MG: 60 TABLET, EXTENDED RELEASE ORAL at 09:01

## 2024-07-08 RX ADMIN — OXYCODONE HYDROCHLORIDE 10 MG: 10 TABLET ORAL at 00:48

## 2024-07-08 RX ADMIN — HEPARIN SODIUM 24 UNITS/KG/HR: 10000 INJECTION, SOLUTION INTRAVENOUS at 04:59

## 2024-07-08 NOTE — ASSESSMENT & PLAN NOTE
Patient presented with worsening substernal chest pain over several weeks.  H/o chronic angina, CAD, family history of hereditary heart disease on fathers side.   Patient was admitted for similar chest pain in early June and was found to have ESRD, started on HD MWF.  Recent nuclear stress test without significant reversible ischemia  EKG on admission: Sinus Rhythm with PAC's. Repeat EKG showed sinus bradycardia  High-sensitivity troponin negative x 5   . D-Dimer 4.21  CTA: subsegmental right lower lobe pulmonary embolism  07/05/24: Updated echocardiogram on unremarkable  Patient blood pressure elevated on admission to 194/80, currently stable.  Patient received 2 doses of nitroglycerin in ED and reports it did not improve his pain, but it improved his elevated blood pressure.  Patient was saturating low 80s on room air on admission and required 3L supplemental O2, which improved saturation to 90s. O2 Sat currently 94% to 95% on room air.  MRSA negative. Viral panel negative.  Multifactorial from acute on chronic anemia, hypertensive urgency, and acute PE   Patient's chest pain has resolved following hemodialysis and the improvement of high blood pressure.   7/9: Telemetry discontinued  7/12: Lab holiday. Will get labs on Monday    Plan-  Per cardiology recs: Continue amlodipine 5 mg daily,Coreg 6.25 mg BID, Imdur 60 mg daily, atorvastatin 80 mg daily. Holding aspirin 81 mg daily

## 2024-07-08 NOTE — ASSESSMENT & PLAN NOTE
Patient presented for worsening chest pain, shortness of breath, and dyspnea on exertion.  Patient was admitted for similar chest pain in early June and was found to have ESRD, started on HD MWF   D-Dimer 4.21  CTA showed subsegmental right lower lobe pulmonary embolism  Placed on heparin drip  7/12/24 Heparin was subtherapeutic this morning, PTT 55. No bolus was given or increase in gtt as patient will have the gtt stopped at 10 am pending IR procedure.    Plan  Resume heparin - Hold on 7/15 8AM for potential kidney biopsy   Transition to warfarin 5 mg daily after renal biopsy   Supplemental oxygen as needed

## 2024-07-08 NOTE — WOUND OSTOMY CARE
"Consult Note - Wound   Joaquin Frankel 44 y.o. male MRN: [id number]  Unit/Bed#: S -01 Encounter: [id number]        History and Present Illness:  Patient is a 45 yo male that was admitted to   for treatment of chest pain. Patient has a PMH of hydradenitis suppurativa, ESRD on HD, CAD s/p stenting/PCI, HTN, DM 2, left leg lymphedema. Patient is alert and oriented times three and agreeable to assessment. Patient is independent for turning and repositioning, assist for care and independent with meals. Patient is continent of bowel and bladder. On assessment, patient is OOB to chair with feet in dependent position. Patient able to transfer self independently to bed for assessment.    Wound Care was consulted for right buttock and left thigh wounds.     Assessment Findings:   B/L heels are dry intact and sancho with no skin loss or wounds present. Recommend preventative Hydraguard Cream and proper offloading/ repositioning.        POA Right Buttock stage 2 pressure injury - oval/irregular shaped area of partial thickness skin loss. Wound bed is non blanchable pink tissue. Sarika wound is hyperpigmented, dry, intact. Recommend silicone foam bordered dressing; patient refused.     POA left thigh and inner groin fold -  two irregular shaped area of partial thickness skin loss photographed and measured together. Wound bed is 50% yellow dried tissue and 50% pink tissue. Sarika wound is dry, scarred, hyperpigmented. Recommend thin layer of Triad paste; patient refused.       Patient adamant about keeping wounds open to air. Patient stated that \"dressings make it worse\". Patient endorsed keeping wounds cleansed with soap and water daily. Patient noted that he absolutely would not place any topical agent or dressing on top. \"I went to wound care they can't help me anymore. I've been dealing with this for three years I know what I need\"     No induration, fluctuance, odor, warmth/temperature differences, redness, or " purulence noted to the above noted wounds and skin areas assessed. Patient tolerated well- no s/s of non-verbal pain or discomfort observed during the encounter. Bedside nurse aware of plan of care. See flow sheets for more detailed assessment findings.      Skin care plans:  1-Hydraguard to bilateral sacrum, buttock and heels BID and PRN  2-Elevate heels to offload pressure.  3-Ehob cushion in chair when out of bed.  4-Moisturize skin daily with skin nourishing cream.  5-Turn/reposition q2h for pressure re-distribution on skin.    Wounds:    Wound 06/05/24 Surgical Thigh Left;Medial;Inner (Active)   Wound Image   07/08/24 0918   Wound Description Dry;Pink;Fragile 07/08/24 0918   Sarika-wound Assessment Dry;Scaly;Fragile 07/08/24 0918   Wound Length (cm) 12 cm 07/08/24 0918   Wound Width (cm) 1 cm 07/08/24 0918   Wound Depth (cm) 0.2 cm 07/08/24 0918   Wound Surface Area (cm^2) 12 cm^2 07/08/24 0918   Wound Volume (cm^3) 2.4 cm^3 07/08/24 0918   Calculated Wound Volume (cm^3) 2.4 cm^3 07/08/24 0918   Change in Wound Size % -500 07/08/24 0918   Drainage Amount None 07/08/24 0918   Non-staged Wound Description Full thickness 07/08/24 0918   Treatments Site care 07/08/24 0918   Dressing Open to air 07/08/24 0918   Wound packed? No 07/08/24 0918   Patient Tolerance Tolerated well 07/08/24 0918   Wound 07/05/24 Pressure Injury Buttocks Right (Active)   Wound Image   07/08/24 0921   Wound Description Intact;Dry;Pink 07/08/24 0921   Pressure Injury Stage 2 07/08/24 0921   Sarika-wound Assessment Dry;Clean;Intact 07/08/24 0921   Wound Length (cm) 2 cm 07/08/24 0921   Wound Width (cm) 0.5 cm 07/08/24 0921   Wound Depth (cm) 0.1 cm 07/08/24 0921   Wound Surface Area (cm^2) 1 cm^2 07/08/24 0921   Wound Volume (cm^3) 0.1 cm^3 07/08/24 0921   Calculated Wound Volume (cm^3) 0.1 cm^3 07/08/24 0921   Drainage Amount None 07/08/24 0921   Treatments Site care 07/08/24 0921   Dressing Open to air 07/08/24 0921   Patient Tolerance  Tolerated well 07/08/24 0921   Dressing Status Clean;Dry;Intact 07/05/24 1909       Wound care will sign off at this time, please re-consult if needed. Please follow skin care recommendations written as orders for skin protection and prevention.        Natalia Dutton RN, BSN, CCRN

## 2024-07-08 NOTE — PROGRESS NOTES
General Cardiology   Progress Note -  [facility]   Joaquin Frankel 44 y.o. male MRN: [id number]  Unit/Bed#: S -01 Encounter: [id number]    Assessment    Chest pain  Multifactorial from acute on chronic anemia, hypertensive urgency, and acute PE  Symptoms have improved with treatment of hypertensive urgency and PE  High-sensitivity troponin negative x 5  Recent nuclear stress test without significant reversible ischemia  Updated echocardiogram unremarkable    Hypertensive urgency  Presented with significantly elevated blood pressures which have improved.  Last /82.  24-hour average BP was 142/73.  Current Rx: Amlodipine 10 mg daily, carvedilol 6.25 mg twice daily, Imdur 60 mg daily, and torsemide 100 mg daily    Subsegmental right lower lobe PE  New this admission, currently on IV heparin with plans to transition to oral anticoagulation after renal biopsy    CAD s/p PCI to LCx 2010, PCI to RCA 2020  Lexiscan Myoview 6/20/2024: Small to medium size perfusion defect in the apical location is predominantly fixed, no major reversible perfusion defects  Maintained on intensity statin, beta-blocker, and Imdur 60 mg daily  Aspirin held in the setting of anemia and need for anticoagulation in setting of new PE    ESRD on HD (new start June 2024)  Nephrology following  Hemodialysis Monday Wednesday Friday  On torsemide 100 mg daily  Renal biopsy planned for this week- Thursday    Type 2 DM-A1c 5.7%    HLD-total cholesterol 184, triglycerides 198, HDL 28,  from lipid panel in April 2024.  On atorvastatin 80 mg daily    Chronic LLE lymphedema    Morbid obesity- BMI 61.15    Anemia- presented with hgb 6.6, now up to 7.8. baseline in the 7s.    Plan  Suspect his presenting chest pain related to PE and hypertensive urgency  Encouraged to ambulate in the hallways and including the stairways with nursing staff to ensure resolution of his chest pain  If no symptoms with these activities, can discontinue telemetry  "without any plans for further cardiology testing this admission  BP control has improved but still suboptimal.  Continue on amlodipine 10 mg daily, torsemide 100 mg daily, Imdur 60 mg daily, and carvedilol 6.25 mg twice daily.  May need additional agent as I would not go up on his beta-blocker given baseline sinus bradycardia in the 50s at present.  Resume ASA 81 mg daily after renal biopsy  Continue high intensity statin, recently started taking again. Will need repeat labs as an outpatient to ensure improvement in lipid profile.    Subjective  Review of Systems   Constitutional: Negative for chills, malaise/fatigue and weight gain.   Cardiovascular:  Negative for chest pain, dyspnea on exertion, leg swelling, orthopnea, palpitations and syncope.   Respiratory:  Negative for cough, shortness of breath, sleep disturbances due to breathing and sputum production.    Endocrine: Negative.    Hematologic/Lymphatic: Negative.    Gastrointestinal:  Negative for bloating and nausea.   Genitourinary: Negative.    Neurological:  Negative for dizziness, light-headedness and weakness.   Psychiatric/Behavioral:  Negative for altered mental status.    All other systems reviewed and are negative.      Objective:   Vitals: Blood pressure 155/82, pulse 56, temperature 98 °F (36.7 °C), resp. rate 16, height 5' 7\" (1.702 m), weight (!) 177 kg (390 lb 6.4 oz), SpO2 94%., Body mass index is 61.15 kg/m².,     Systolic (24hrs), Av , Min:126 , Max:155     Diastolic (24hrs), Av, Min:69, Max:82        Intake/Output Summary (Last 24 hours) at 2024 0903  Last data filed at 2024 0903  Gross per 24 hour   Intake 720 ml   Output 1000 ml   Net -280 ml     Wt Readings from Last 3 Encounters:   24 (!) 177 kg (390 lb 6.4 oz)   24 (!) 182 kg (400 lb 9.2 oz)   24 (!) 187 kg (413 lb)     Telemetry Review: Sinus bradycardia    Physical Exam  Vitals reviewed.   Constitutional:       General: He is not in acute " distress.     Appearance: He is obese.   Neck:      Vascular: No hepatojugular reflux or JVD.   Cardiovascular:      Rate and Rhythm: Regular rhythm. Bradycardia present.      Heart sounds: Normal heart sounds. No murmur heard.     No friction rub. No gallop.   Pulmonary:      Effort: Pulmonary effort is normal. No respiratory distress.      Breath sounds: No rales.      Comments: Room air  Abdominal:      General: Bowel sounds are normal. There is no distension.      Palpations: Abdomen is soft.      Tenderness: There is no abdominal tenderness.   Musculoskeletal:         General: No tenderness. Normal range of motion.      Cervical back: Neck supple.      Right lower leg: Edema (1+) present.      Left lower leg: Edema (1+, lymphedema present as well) present.   Skin:     General: Skin is warm and dry.      Capillary Refill: Capillary refill takes 2 to 3 seconds.      Coloration: Skin is pale.      Findings: No erythema.   Neurological:      Mental Status: He is alert and oriented to person, place, and time.   Psychiatric:         Mood and Affect: Mood normal.         LABORATORY RESULTS      CBC with diff:   Results from last 7 days   Lab Units 07/08/24 0518 07/07/24 0515 07/06/24 0456 07/05/24 0411 07/04/24  1538   WBC Thousand/uL 6.73 7.09 6.83 6.72 6.92   HEMOGLOBIN g/dL 7.8* 7.9* 7.5* 7.6* 6.6*   HEMATOCRIT % 25.0* 25.2* 24.6* 24.4* 21.2*   MCV fL 93 93 94 92 93   PLATELETS Thousands/uL 267 240 214 229 254   RBC Million/uL 2.70* 2.70* 2.63* 2.64* 2.27*   MCH pg 28.9 29.3 28.5 28.8 29.1   MCHC g/dL 31.2* 31.3* 30.5* 31.1* 31.1*   RDW % 14.2 14.1 14.5 14.7 14.3   MPV fL 9.4 9.2 9.2 9.0 8.9   NRBC AUTO /100 WBCs 0  --  0  --  0     CMP:  Results from last 7 days   Lab Units 07/08/24 0518 07/07/24 0515 07/06/24 0456 07/05/24 0411 07/04/24  1538   POTASSIUM mmol/L 4.5 4.2 4.2 4.5 4.5   CHLORIDE mmol/L 95* 97 96 97 96   CO2 mmol/L 25 25 29 31 27   BUN mg/dL 51* 45* 33* 32* 27*   CREATININE mg/dL 11.13* 10.20*  8.43* 9.93* 9.02*   CALCIUM mg/dL 8.9 8.8 8.8 8.8 9.0   AST U/L  --   --   --   --  15   ALT U/L  --   --   --   --  <3*   ALK PHOS U/L  --   --   --   --  90   EGFR ml/min/1.73sq m 4 5 6 5 6     BMP:  Results from last 7 days   Lab Units 24  0518 24  0515 24  0456 24  0411 24  1538   POTASSIUM mmol/L 4.5 4.2 4.2 4.5 4.5   CHLORIDE mmol/L 95* 97 96 97 96   CO2 mmol/L 25 25 29 31 27   BUN mg/dL 51* 45* 33* 32* 27*   CREATININE mg/dL 11.13* 10.20* 8.43* 9.93* 9.02*   CALCIUM mg/dL 8.9 8.8 8.8 8.8 9.0     Lab Results   Component Value Date    CREATININE 11.13 (H) 2024    CREATININE 10.20 (H) 2024    CREATININE 8.43 (H) 2024     Lab Results   Component Value Date    NTBNP 203 (H) 2021    NTBNP 32 2021     Results from last 7 days   Lab Units 24  1538   MAGNESIUM mg/dL 1.9     Results from last 7 days   Lab Units 24  2013   INR  1.18     Lipid Profile:   Lab Results   Component Value Date    CHOL 147 2016     Lab Results   Component Value Date    HDL 28 (L) 2024    HDL 31 (L) 2023    HDL 29 (L) 2022     Lab Results   Component Value Date    LDLCALC 121 (H) 2024    LDLCALC 84 2023    LDLCALC 63 2022     Lab Results   Component Value Date    TRIG 198 (H) 2024    TRIG 131 2023    TRIG 83 2022       Cardiac testing:   Results for orders placed during the hospital encounter of 21    Echo complete with contrast if indicated    Narrative  [address] [address] [phone]    Transthoracic Echocardiogram  2D, M-mode, Doppler, and Color Doppler    Study date:  2021    Patient: MORELIA MASSEY  MR number: [id number]  Account number: [id number]  : 1980  Age: 40 years  Gender: Male  Status: Inpatient  Location: Bedside  Height: 66 in  Weight: 369.2 lb  BP: 100/ 58 mmHg    Indications: Assess left ventricular  function.    Diagnoses: J96.91 - Respiratory failure, unspecified with hypoxia    Sonographer:  SIMEON Barnes  Primary Physician:  Heydi Norman MD  Referring Physician:  Lauryn Ullrich,DO  Group:  [facility]  Cardiology Fellow:  Simba Rm MD  Interpreting Physician:  Irving Arnett MD    SUMMARY    PROCEDURE INFORMATION:  This was a technically difficult study.  Intravenous contrast ( 0.6 ml Definity/NSS) was administered.    LEFT VENTRICLE:  Systolic function was normal. Ejection fraction was estimated to be 65 %.  There were no regional wall motion abnormalities.    RIGHT VENTRICLE:  The size was normal.  Systolic function was normal.    HISTORY: PRIOR HISTORY: Respiratory failure with hypoxia, CAD s/p stent, Hyperlipidemia    PROCEDURE: The procedure was performed at the bedside. This was a routine study. The transthoracic approach was used. The study included complete 2D imaging, M-mode, complete spectral Doppler, and color Doppler. The heart rate was 66 bpm,  at the start of the study. Images were obtained from the parasternal, apical, subcostal, and suprasternal notch acoustic windows. Intravenous contrast ( 0.6 ml Definity/NSS) was administered. Echocardiographic views were limited due to  decreased penetration and patient on mechanical ventilator. This was a technically difficult study.    LEFT VENTRICLE: Size was normal. Systolic function was normal. Ejection fraction was estimated to be 65 %. There were no regional wall motion abnormalities. Wall thickness was normal. There was no evidence of concentric hypertrophy.  DOPPLER: The transmitral flow pattern was normal. The study was not technically sufficient to allow evaluation of LV diastolic function.    RIGHT VENTRICLE: The size was normal. Systolic function was normal.    LEFT ATRIUM: Size was normal.    RIGHT ATRIUM: Size was normal.    MITRAL VALVE: Valve structure was normal. There was normal leaflet  separation. DOPPLER: The transmitral velocity was within the normal range. There was no evidence for stenosis. There was no significant regurgitation.    AORTIC VALVE: The valve was probably trileaflet. Leaflets exhibited normal thickness and normal cuspal separation. DOPPLER: Transaortic velocity was within the normal range. There was no evidence for stenosis. There was no regurgitation.    TRICUSPID VALVE: The valve structure was normal. There was normal leaflet separation. DOPPLER: The transtricuspid velocity was within the normal range. There was no evidence for stenosis. There was trace regurgitation. The tricuspid jet  envelope definition was inadequate for estimation of RV systolic pressure.    PULMONIC VALVE: Leaflets exhibited normal thickness, no calcification, and normal cuspal separation. DOPPLER: The transpulmonic velocity was within the normal range. There was no evidence for stenosis. There was no significant  regurgitation.    PERICARDIUM: There was no pericardial effusion.    AORTA: The root exhibited normal size.    SYSTEMIC VEINS: IVC: The inferior vena cava was dilated. Respirophasic changes in dimension were absent.    SYSTEM MEASUREMENT TABLES    2D  %FS: 40.76 %  Ao Diam: 3.12 cm  EDV(Teich): 125.68 ml  EF(Teich): 71.22 %  ESV(Teich): 36.17 ml  IVSd: 0.96 cm  LA Area: 18.82 cm2  LA Diam: 4.09 cm  LVEDV MOD A4C: 139.97 ml  LVEF MOD A4C: 73.53 %  LVESV MOD A4C: 37.05 ml  LVIDd: 5.13 cm  LVIDs: 3.04 cm  LVLd A4C: 8.89 cm  LVLs A4C: 6.99 cm  LVPWd: 0.97 cm  RA Area: 19.01 cm2  RVIDd: 3.58 cm  SV MOD A4C: 102.92 ml  SV(Teich): 89.51 ml    MM  TAPSE: 2.34 cm    PW  E' Sept: 0.07 m/s  E/E' Sept: 7.73  MV A Flavio: 0.38 m/s  MV Dec [place]: 2.89 m/s2  MV DecT: 197.93 ms  MV E Flavio: 0.57 m/s  MV E/A Ratio: 1.5  MV PHT: 57.4 ms  MVA By PHT: 3.83 cm2    [facility] Accredited Ech[facility] Laboratory    Prepared and electronically signed by    Irving Arnett MD  Signed 07-Apr-2021  10:12:03    Meds/Allergies   all current active meds have been reviewed and current meds:   Current Facility-Administered Medications   Medication Dose Route Frequency    acetaminophen (TYLENOL) tablet 650 mg  650 mg Oral Q6H PRN    amLODIPine (NORVASC) tablet 10 mg  10 mg Oral Daily    atorvastatin (LIPITOR) tablet 80 mg  80 mg Oral Daily    carvedilol (COREG) tablet 6.25 mg  6.25 mg Oral BID With Meals    ferrous sulfate tablet 325 mg  325 mg Oral BID With Meals    heparin (porcine) 25,000 units in 0.45% NaCl 250 mL infusion (premix)  3-30 Units/kg/hr (Order-Specific) Intravenous Titrated    heparin (porcine) injection 10,000 Units  10,000 Units Intravenous Q6H PRN    heparin (porcine) injection 5,000 Units  5,000 Units Intravenous Q6H PRN    HYDROmorphone (DILAUDID) injection 0.5 mg  0.5 mg Intravenous Q4H PRN    isosorbide mononitrate (IMDUR) 24 hr tablet 60 mg  60 mg Oral Daily    labetalol (NORMODYNE) injection 10 mg  10 mg Intravenous Q6H PRN    lidocaine (LIDODERM) 5 % patch 1 patch  1 patch Topical Daily    oxyCODONE (ROXICODONE) immediate release tablet 10 mg  10 mg Oral Q4H PRN    sevelamer (RENAGEL) tablet 800 mg  800 mg Oral TID With Meals    torsemide (DEMADEX) tablet 100 mg  100 mg Oral Daily       Medications Prior to Admission:     aspirin (ECOTRIN LOW STRENGTH) 81 mg EC tablet    atorvastatin (LIPITOR) 80 mg tablet    Cholecalciferol (VITAMIN D3) 1,000 units tablet    cyanocobalamin (VITAMIN B-12) 1000 MCG tablet    ferrous sulfate 324 (65 Fe) mg    isosorbide mononitrate (IMDUR) 60 mg 24 hr tablet    metoprolol succinate (TOPROL-XL) 25 mg 24 hr tablet    sevelamer (RENAGEL) 800 mg tablet    torsemide (DEMADEX) 20 mg tablet    Ascorbic Acid (vitamin C) 100 MG tablet    glimepiride (AMARYL) 2 mg tablet    insulin glargine (LANTUS) 100 units/mL subcutaneous injection    Insulin Pen Needle (BD ULTRA-FINE PEN NEEDLES) 29G X 12.7MM MISC    nystatin (MYCOSTATIN) powder    oxyCODONE (ROXICODONE) 10 MG  TABS    heparin (porcine), 3-30 Units/kg/hr (Order-Specific), Last Rate: 26 Units/kg/hr (07/08/24 0748)    Counseling / Coordination of Care  Total floor / unit time spent today 20 minutes.  Greater than 50% of total time was spent with the patient and / or family counseling and / or coordination of care.      ** Please Note: Dragon 360 Dictation voice to text software may have been used in the creation of this document. **

## 2024-07-08 NOTE — PROGRESS NOTES
[facility]  Progress Note  Name: Joaquin Frankel I  MRN: [id number]  Unit/Bed#: S -Karon I Date of Admission: 7/4/2024   Date of Service: 7/8/2024 I Hospital Day: 4    Assessment & Plan   * Chest pain  Assessment & Plan  Patient reports worsening substernal chest pain over several weeks.  H/o chronic angina, CAD, family history of hereditary heart disease on fathers side.   Patient was admitted for similar chest pain in early June and was found to have ESRD, started on HD MWF.  EKG on admission: Sinus Rhythm with PAC's. Repeat EKG showed sinus bradycardia  High-sensitivity troponin negative x 5   . D-Dimer 4.21  CTA subsegmental right lower lobe pulmonary embolism  Recent nuclear stress test without significant reversible ischemia  Updated echocardiogram on 07/05/24 unremarkable  Patient blood pressure elevated on admission to 194/80, currently stable.  Patient received 2 doses of nitroglycerin in ED and reports it did not improve his pain, but it improved his elevated blood pressure.  Patient was saturating low 80s on room air on admission and required 3L supplemental O2, which improved saturation to 90s. O2 Sat currently 94% to 95% on room air.  MRSA negative. Viral panel negative.      Multifactorial from acute on chronic anemia, hypertensive urgency, and acute PE   Patient's chest pain has improved following hemodialysis and the improvement of high blood pressure. Patient was able to tolerate bathing himself without significant pain.      Plan-  If chest pain persist despite of HD, heparin drip, and normotension patient may need cardiac catheterization at some point.  Per cardiology recs: Continue amlodipine 5 mg daily,Coreg 6.25 mg BID, Imdur 60 mg daily, atorvastatin 80 mg daily. Holding aspirin 81 mg daily.    Pt was encouraged to ambulate in the hallways and stairways with nursing staff to ensure resolution of his chest pain. If no symptoms with these activities,  discontinue telemetry without any plans for further cardiology testing this admission    Pulmonary embolism (HCC)  Assessment & Plan  Patient admitted to the ED for worsening chest pain, shortness of breath, and dyspnea on exertion.  Patient was admitted for similar chest pain in early June and was found to have ESRD, started on HD MWF   D-Dimer 4.21  CTA showed subsegmental right lower lobe pulmonary embolism  Placed on heparin drip  Patient no longer on nasal cannula  Echo:  Left Ventricle: Left ventricular cavity size is normal. There is mild concentric hypertrophy. The left ventricular ejection fraction is 65%. Systolic function is normal. Wall motion is normal. Diastolic function is normal. Right Ventricle: Right ventricular cavity size is normal. Systolic function is normal. Tricuspid Valve: There is trace regurgitation. Prior TTE study available for comparison. Prior study date: 6/4/2024. No significant changes noted compared to the prior study.    Plan-  Continue Heparin drip. To be held morning of renal biopsy (07/11/2024)  Transition to oral anticoagulation Eliquis after renal biopsy   Supplemental oxygen as needed  Monitor telemetry    Type 2 diabetes mellitus (HCC)  Assessment & Plan  Lab Results   Component Value Date    HGBA1C 5.7 (H) 06/09/2024       Recent Labs     07/07/24  1542 07/07/24  2113 07/08/24  0742 07/08/24  1058   POCGLU 102 118 76 99         Blood Sugar Average: Last 72 hrs:  (P) [id number]      Anemia  Assessment & Plan  Patient has history of anemia of chronic disease, ESRD  CBC on admission showed hgb 6.6 / hct 21.2  Received 2 units packed RBC's in ED. Hbg 7.6 after transfusion  Hematology following with outpatient f/u scheduled 7/11/2024, patient will reschedule due to overlapping hospital admission   On oral iron twice daily  No active bleeding, on heparin for PE, will switch to Eliquis after renal biopsy procedure.  Hgb below goal, but stable. Hgb 7.8  today.    Plan:  Continue to monitor hemoglobin levels  Transfuse 1 unit of blood today during HD. Pt signed consent.  Transfuse for Hgb <8.0    ESRD (end stage renal disease) on dialysis ([facility])  Assessment & Plan  Lab Results   Component Value Date    EGFR 4 07/08/2024    EGFR 5 07/07/2024    EGFR 6 07/06/2024    CREATININE 11.13 (H) 07/08/2024    CREATININE 10.20 (H) 07/07/2024    CREATININE 8.43 (H) 07/06/2024     Patient ESRD on Dialysis 3 times a week, MWF  Elevated , c/o of orthopnea   Etiology of CLOVIS: Etiology not entirely clear - Possible MGRS? Pattern of disease progression is not typical for diabetic nephropathy.   Etiology of CKD: Suspected due to diabetic nephropathy, hypertensive nephrosclerosis, obesity related renal dysfunction  Previous baseline creatinine 1.3-1.6 w/ prior episodes of CLOVIS  Serologic workup negative except for IgG kappa on SPEP and UPEP.  Cryoglobulin negative, ANCA negative, anti-dsDNA negative, anti-GBM negative, JOSE ROBERTO negative, C4 34, C3 118  Bone marrow biopsy 6/12/2024: negative for plasma cell neoplasm.   CRRT initiated 6/7/2024.  Peak creatinine 10.4  Last treatment 7/5/2024 with UF 3.5 L  EDW: 181 kg  electrolytes and acid/base stable  Creatinine 11.13 today    Plan:  Nephrology onboard  IR guided renal biopsy scheduled for Thursday 07/11/2024.Hold heparin that morning.  Hold oral AC. Resume ASA 81 mg daily after renal biopsy   HD today. Patient consented for transfusion of 1 unit of blood during HD.    Continue torsemide 100 mg daily    ANGY (obstructive sleep apnea)  Assessment & Plan  Overnight pulse oximetry obtain: highest O2 sat 99%, his lowest O2 sat 73%, with a mean of 91%. Patient had 120 desaturation events <89%.  He spent an 1 hour and 15 minutes in desaturation (~15.82%) and and 1 hour and 34 minutes with desaturation <89% (~19.77%).  Counseled patient on importance of maintaining oxygen saturation as oxygen deprivation can lead to hypertension, heart failure,  coronary artery disease, pulmonary hypertension, etc.    Patient was advised of need to wear oxygen at night upon discharge. He declines stating he would like to wait for formal evaluation via sleep study as an outpatient.    Chronic acquired lymphedema  Assessment & Plan  Chronic LLE lymphedema x 3 years after surgical resection of LLE cyst    Plan:  Advised compression and elevation of lower extremity for symptomatic edema relief    Hypertension  Assessment & Plan  History of HTN, recent medication change after dx of ESRD  Workup at that time notable for IgG kappa monoclonal gammopathy and s/p bone marrow biopsy on 6/12/2023 to assess for multiple myeloma. Bone marrow biopsy with no evidence of plasma cell neoplasm.   HD on MWF schedule.   Presented to ED with uncontrolled /80. Endorsed headaches and blurry vision in his left eye in the ED that has since resolved.  BP improved but still suboptimal. Last /82.  24-hour average BP was 142/73.     Plan:  Continue amlodipine 10 mg daily, carvedilol 6.25 mg twice daily, Imdur 60 mg once daily, torsemide 100 mg daily  Hold carvedilol if HR <50  Nephrology and cardiology onbooard   Continue UF with dialysis as BP tolerates   HD today        Acute respiratory failure with hypoxia (HCC)-resolved as of 7/6/2024  Assessment & Plan  Patient O2 saturation on admission was 83% on RA  Was placed on 3/L Nasal canula FiO2 32%  CTA showed right lower lobe subsegmental PE  Was placed on heparin  Pt currently on RA    Plan:  Supplemental oxygen as needed  Continue heparin for PE  Incentive Spirometry           VTE Pharmacologic Prophylaxis: VTE Score: 7 High Risk (Score >/= 5) - Pharmacological DVT Prophylaxis Ordered: heparin drip. Sequential Compression Devices Ordered.    Mobility:   Basic Mobility Inpatient Raw Score: 24  JH-HLM Goal: 8: Walk 250 feet or more  JH-HLM Achieved: 7: Walk 25 feet or more  JH-HLM Goal NOT achieved. Continue with multidisciplinary rounding  and encourage appropriate mobility to improve upon -[facility] goals.    Patient Centered Rounds: I performed bedside rounds with nursing staff today.   Discussions with Specialists or Other Care Team Provider: cardiology, nephrology    Education and Discussions with Family / Patient: Patient declined call to .     Current Length of Stay: 4 day(s)  Current Patient Status: Inpatient   Discharge Plan: Anticipate discharge in 48-72 hrs to home.    Code Status: Level 1 - Full Code    Subjective:   Patient was seen and evaluated at bedside this morning. Patient reports chest pain is improved from admission. He slept better overnight and reports feelings less anxious overall. Patient is eating and having bowel movements at baseline and reports increased urine production with increased diuretics dosing inpatient. Patient is requesting medical documentation to provide to his employer.     Objective:     Vitals:   Temp (24hrs), Av.7 °F (36.5 °C), Min:97.4 °F (36.3 °C), Max:98 °F (36.7 °C)    Temp:  [97.4 °F (36.3 °C)-98 °F (36.7 °C)] 98 °F (36.7 °C)  HR:  [56] 56  Resp:  [16] 16  BP: (126-155)/(69-82) 155/82  Body mass index is 61.15 kg/m².     Input and Output Summary (last 24 hours):     Intake/Output Summary (Last 24 hours) at 2024 1414  Last data filed at 2024 1310  Gross per 24 hour   Intake 820 ml   Output 1000 ml   Net -180 ml       Physical Exam:   Physical Exam  Constitutional:       General: He is not in acute distress.     Appearance: He is obese. He is not ill-appearing.   HENT:      Head: Normocephalic and atraumatic.      Right Ear: External ear normal.      Left Ear: External ear normal.      Nose: Nose normal.      Mouth/Throat:      Mouth: Mucous membranes are moist.      Pharynx: Oropharynx is clear.   Eyes:      Extraocular Movements: Extraocular movements intact.      Conjunctiva/sclera: Conjunctivae normal.   Cardiovascular:      Rate and Rhythm: Regular rhythm. Bradycardia present.       Pulses: Normal pulses.      Heart sounds: Normal heart sounds. No murmur heard.     No gallop.   Pulmonary:      Effort: Pulmonary effort is normal. No respiratory distress.      Breath sounds: Normal breath sounds. No wheezing.      Comments: Tolerating room air  Abdominal:      General: Bowel sounds are normal. There is no distension.      Palpations: Abdomen is soft.      Tenderness: There is no abdominal tenderness.   Musculoskeletal:         General: Normal range of motion.      Cervical back: Normal range of motion and neck supple. No rigidity.      Right lower leg: Edema (violaceous skin changes right lower extremity, punctate wounds healing over right shin) present.      Left lower leg: Edema (lymphedema) present.      Comments: LLE significantly larger than RLE due to chronic lymphedema. Old healed surgical incision noted to left calf     Skin:     General: Skin is warm and dry.      Comments: R IJV PermCath site clear.  Dressing intact   Neurological:      General: No focal deficit present.      Mental Status: He is alert and oriented to person, place, and time. Mental status is at baseline.   Psychiatric:         Mood and Affect: Mood normal.          Additional Data:     Labs:  Results from last 7 days   Lab Units 07/08/24  0518   WBC Thousand/uL 6.73   HEMOGLOBIN g/dL 7.8*   HEMATOCRIT % 25.0*   PLATELETS Thousands/uL 267   SEGS PCT % 71   LYMPHO PCT % 14   MONO PCT % 8   EOS PCT % 6     Results from last 7 days   Lab Units 07/08/24  0518 07/05/24  0411 07/04/24  1538   SODIUM mmol/L 133*   < > 135   POTASSIUM mmol/L 4.5   < > 4.5   CHLORIDE mmol/L 95*   < > 96   CO2 mmol/L 25   < > 27   BUN mg/dL 51*   < > 27*   CREATININE mg/dL 11.13*   < > 9.02*   ANION GAP mmol/L 13   < > 12   CALCIUM mg/dL 8.9   < > 9.0   ALBUMIN g/dL  --   --  3.1*   TOTAL BILIRUBIN mg/dL  --   --  0.41   ALK PHOS U/L  --   --  90   ALT U/L  --   --  <3*   AST U/L  --   --  15   GLUCOSE RANDOM mg/dL 68   < > 89    < > =  values in this interval not displayed.     Results from last 7 days   Lab Units 07/04/24 2013   INR  1.18     Results from last 7 days   Lab Units 07/08/24  1058 07/08/24  0742 07/07/24  2113 07/07/24  1542 07/07/24  1119 07/07/24  0718 07/06/24  2104 07/06/24  1610 07/06/24  1137 07/06/24  0716 07/05/24  2126 07/05/24  1743   POC GLUCOSE mg/dl 99 76 118 102 86 80 115 110 95 78 106 114               Lines/Drains:  Invasive Devices       Peripheral Intravenous Line  Duration             Peripheral IV 07/04/24 Dorsal (posterior);Left Forearm 3 days              Hemodialysis Catheter  Duration             HD Permanent Double Catheter 23 days                      Telemetry:  Telemetry Orders (From admission, onward)               24 Hour Telemetry Monitoring  Continuous x 24 Hours (Telem)        Expiring   Question:  Reason for 24 Hour Telemetry  Answer:  Pulmonary Embolism                     Telemetry Reviewed: Sinus Bradycardia  Indication for Continued Telemetry Use: PE             Imaging: Reviewed radiology reports from this admission including: chest xray, abdominal/pelvic CT, and ECHO    Recent Cultures (last 7 days):         Last 24 Hours Medication List:   Current Facility-Administered Medications   Medication Dose Route Frequency Provider Last Rate    acetaminophen  650 mg Oral Q6H PRN Mily Martin MD      amLODIPine  10 mg Oral Daily Andrea Longoria MD      atorvastatin  80 mg Oral Daily Mily Martin MD      carvedilol  6.25 mg Oral BID With Meals Andrea Longoria MD      ferrous sulfate  325 mg Oral BID With Meals Mily Martin MD      heparin (porcine)  3-30 Units/kg/hr (Order-Specific) Intravenous Titrated Gustavo Neville MD 26 Units/kg/hr (07/08/24 1111)    heparin (porcine)  10,000 Units Intravenous Q6H PRN Monique Rothman DO      heparin (porcine)  5,000 Units Intravenous Q6H PRN Monique Rothman DO      isosorbide mononitrate  60 mg Oral Daily Mily Martin MD       labetalol  10 mg Intravenous Q6H PRN Liana Riley MD      lidocaine  1 patch Topical Daily Mily Martin MD      oxyCODONE  10 mg Oral Q4H PRN Lorri Maria MD      oxyCODONE  5 mg Oral Q4H PRN Lorri Maria MD      sevelamer  800 mg Oral TID With Meals Mily Martin MD      torsemide  100 mg Oral Daily Charles Spear MD          Today, Patient Was Seen By: Deya Palomino MD    **Please Note: This note may have been constructed using a voice recognition system.**

## 2024-07-08 NOTE — ASSESSMENT & PLAN NOTE
Overnight pulse oximetry obtained: highest O2 sat 99%, his lowest O2 sat 73%, with a mean of 91%. Patient had 120 desaturation events <89%.  He spent an 1 hour and 15 minutes in desaturation (~15.82%) and and 1 hour and 34 minutes with desaturation <89% (~19.77%).  Counseled patient on importance of maintaining oxygen saturation as oxygen deprivation can lead to hypertension, heart failure, coronary artery disease, pulmonary hypertension, etc.    Patient was advised of need to wear oxygen at night upon discharge. He declines stating he would like to wait for formal evaluation via sleep study as an outpatient.

## 2024-07-08 NOTE — ASSESSMENT & PLAN NOTE
Lab Results   Component Value Date    HGBA1C 5.7 (H) 06/09/2024       Recent Labs     07/12/24  1521 07/12/24  2157 07/13/24  0808 07/13/24  1145   POCGLU 82 133 95 103       Blood Sugar Average: Last 72 hrs:  (P) [id number]

## 2024-07-08 NOTE — PROGRESS NOTES
NEPHROLOGY PROGRESS NOTE   Joaquin Frankel 44 y.o. male MRN: [id number]  Unit/Bed#: S -01 Encounter: [id number]    ASSESSMENT & PLAN:  44-year-old male presented with complaint of chest pain.  Nephrology was consulted for acute kidney injury on HD.  Recent hospital admission at [facility] between Tori 3 Tori 26 during that hospital admission he had a admission creatinine of 6.4 and was started on intermittent HD on 6/7/24 and has been on dialysis dependent since then.  He now presented with complaint of chest pain and CTA on admission revealed PE.    Acute kidney injury on HD, POA  -History of acute kidney injury previous hospital admission in June 2024 and on HD since 6/7/2024.  Current outpatient dialysis clinic [facility] Monday Wednesday Friday.  -Access right IJ PermCath  -Serum creatinine was 1.22 mg/dL in February 2024, increased to 2.98 in April 2024 and then increased to 6.4 in June 2024.  Serological workup was negative except for IgG kappa on SPEP and UPEP.  Cryoglobulin negative, ANCA negative anti-double-stranded DNA negative anti-GBM negative JOSE ROBERTO negative, C3-C4 at normal range.  Bone marrow biopsy done on June 12, 2024 did not reveal a plasma cell neoplasm.  -Etiology: Exact etiology not clear.  Patient does admit to using NSAIDs which could be contributing to CLOVIS.  Also there is possibility of MGRS -plan for kidney biopsy  Plan:  Plan for kidney biopsy this week on Thursday as per my discussion with patient.  Continue to hold aspirin  Patient was seen and examined on hemodialysis treatment today, plan for ultrafiltration 3.5 L net, plan for PRBC on HD, recommended using 2K bath as patient will be receiving PRBC today.  Single visit during hemodialysis treatment, tolerating HD, vitals reviewed.  Continue to monitor for renal recovery.  Estimated dry weight 181 kg    Chronic kidney disease stage III: Baseline creatinine 1.2 to 1.5 mg/dL.  No prior renal follow-up.  History of  nephrotic range proteinuria in April 2024    Primary hypertension: Blood pressure elevated but acceptable.  Continue current treatment.  Avoid hypotension.  Currently on amlodipine, Coreg, torsemide    Fluid overload: Continue UF with HD continue to challenge target weight.  Patient has left lower extremity lymphedema but also has trace right lower extremity edema     Anemia, unspecified  -no JOSSELYN due to acute PE.  Iron saturation 17%  -Hemoglobin currently trending down to 7.8 g/dL continue to monitor.  Noted plan for PRBC.    Nephrotic range proteinuria  -Albumin creatinine ratio was 5.2 g in April 2024, plan for kidney biopsy as mentioned above    IgG kappa monoclonal gammopathy, bone marrow biopsy on June 12, 2024 did not reveal plasma cell neoplasm.  Plan for kidney biopsy as mentioned above    CKD/MBD/hyperphosphatemia   -continue sevelamer.  Phosphorus is at goal recommend low phosphorus diet    Pulmonary embolism: On heparin drip.  Hold off on NOAC due to need for kidney biopsy    Left lower extremity lymphedema recommend limb elevation   continue UF with HD    Complaint of chest pain, history of CAD, cardiology on board,  repeat nuclear stress test without significant reversible ischemia.  As per cardiology note most likely due to acute PE chronic anemia and hypertension causing chest pain    Discussed with primary team regarding plan for intermittent HD treatment today and agreed with the plan      SUBJECTIVE:  No new complaints.  No chest pain or shortness of breath, no nausea vomiting    OBJECTIVE:  Current Weight: Weight - Scale: (!) 177 kg (390 lb 6.4 oz)  Vitals:    07/08/24 0755   BP: 155/82   Pulse: 56   Resp:    Temp: 98 °F (36.7 °C)   SpO2:        Intake/Output Summary (Last 24 hours) at 7/8/2024 1401  Last data filed at 7/8/2024 1310  Gross per 24 hour   Intake 820 ml   Output 1000 ml   Net -180 ml       Physical Exam  General:  Ill looking, awake. Obese   Eyes: Conjunctivae pink,  Sclera  anicteric  ENT: lips and mucous membranes moist  Neck: supple   Chest: Clear to Auscultation both lungs,  no crackles, ronchus or wheezing.  CVS: S1 & S2 present, normal rate, regular rhythm, no murmur.  Abdomen: soft, non-tender, non-distended, Bowel sounds normoactive  Extremities: 1+ edema right leg, left leg lymphedema  Skin: no rash  Neuro: awake, alert, oriented x 3   Psych: Mood and affect appropriate       Medications:    Current Facility-Administered Medications:     acetaminophen (TYLENOL) tablet 650 mg, 650 mg, Oral, Q6H PRN, Mily Martin MD, 650 mg at 07/05/24 1251    amLODIPine (NORVASC) tablet 10 mg, 10 mg, Oral, Daily, Andrea Longoria MD, 10 mg at 07/08/24 0857    atorvastatin (LIPITOR) tablet 80 mg, 80 mg, Oral, Daily, Mily Martin MD, 80 mg at 07/08/24 0901    carvedilol (COREG) tablet 6.25 mg, 6.25 mg, Oral, BID With Meals, Andrea Longoria MD, 6.25 mg at 07/08/24 0754    ferrous sulfate tablet 325 mg, 325 mg, Oral, BID With Meals, Mily Martin MD, 325 mg at 07/08/24 0754    heparin (porcine) 25,000 units in 0.45% NaCl 250 mL infusion (premix), 3-30 Units/kg/hr (Order-Specific), Intravenous, Titrated, Gustavo Neville MD, Last Rate: 32.5 mL/hr at 07/08/24 1111, 26 Units/kg/hr at 07/08/24 1111    heparin (porcine) injection 10,000 Units, 10,000 Units, Intravenous, Q6H PRN, Monique Rothman DO    heparin (porcine) injection 5,000 Units, 5,000 Units, Intravenous, Q6H PRN, Monique Rothman DO, 5,000 Units at 07/08/24 0748    isosorbide mononitrate (IMDUR) 24 hr tablet 60 mg, 60 mg, Oral, Daily, Mily Martin MD, 60 mg at 07/08/24 0901    labetalol (NORMODYNE) injection 10 mg, 10 mg, Intravenous, Q6H PRN, Liana Riley MD    lidocaine (LIDODERM) 5 % patch 1 patch, 1 patch, Topical, Daily, Mily Martin MD    oxyCODONE (ROXICODONE) immediate release tablet 10 mg, 10 mg, Oral, Q4H PRN, Lorri Maria MD    oxyCODONE (ROXICODONE) IR tablet 5 mg, 5 mg, Oral,  "Q4H PRN, Lorri Maria MD, 5 mg at 07/08/24 1310    sevelamer (RENAGEL) tablet 800 mg, 800 mg, Oral, TID With Meals, Mily Martin MD, 800 mg at 07/08/24 1111    torsemide (DEMADEX) tablet 100 mg, 100 mg, Oral, Daily, Charles Spear MD, 100 mg at 07/08/24 0901    Invasive Devices:        Lab Results:   Results from last 7 days   Lab Units 07/08/24  0518 07/07/24  0515 07/06/24  0456 07/05/24  0411 07/04/24  1538   WBC Thousand/uL 6.73 7.09 6.83   < > 6.92   HEMOGLOBIN g/dL 7.8* 7.9* 7.5*   < > 6.6*   HEMATOCRIT % 25.0* 25.2* 24.6*   < > 21.2*   PLATELETS Thousands/uL 267 240 214   < > 254   POTASSIUM mmol/L 4.5 4.2 4.2   < > 4.5   CHLORIDE mmol/L 95* 97 96   < > 96   CO2 mmol/L 25 25 29   < > 27   BUN mg/dL 51* 45* 33*   < > 27*   CREATININE mg/dL 11.13* 10.20* 8.43*   < > 9.02*   CALCIUM mg/dL 8.9 8.8 8.8   < > 9.0   MAGNESIUM mg/dL  --   --   --   --  1.9   PHOSPHORUS mg/dL 5.8*  --  4.6*  --   --    ALK PHOS U/L  --   --   --   --  90   ALT U/L  --   --   --   --  <3*   AST U/L  --   --   --   --  15    < > = values in this interval not displayed.       Previous work up:         Portions of the record may have been created with voice recognition software. Occasional wrong word or \"sound a like\" substitutions may have occurred due to the inherent limitations of voice recognition software. Read the chart carefully and recognize, using context, where substitutions have occurred.If you have any questions, please contact the dictating provider.    "

## 2024-07-08 NOTE — ASSESSMENT & PLAN NOTE
Patient O2 saturation on admission was 83% on RA  Was placed on 3/L Nasal canula FiO2 32%  CTA showed right lower lobe subsegmental PE  Was placed on heparin    Plan:  Supplemental oxygen as needed  Continue heparin for PE  Incentive Spirometry

## 2024-07-08 NOTE — ASSESSMENT & PLAN NOTE
Hemoglobin   Date Value Ref Range Status   07/13/2024 9.4 (L) 12.0 - 17.0 g/dL Final   01/23/2017 14.5 12.6 - 17.7 g/dL Final     Hematocrit   Date Value Ref Range Status   07/13/2024 30.0 (L) 36.5 - 49.3 % Final   01/23/2017 43.8 37.5 - 51.0 % Final       Patient has history of anemia of chronic disease, ESRD  CBC on admission showed hgb 6.6 / hct 21.2  Received 2 units packed RBC's in ED. Hbg 7.6 after transfusion  Hematology following with outpatient f/u scheduled 7/11/2024, patient will reschedule due to overlapping hospital admission   On oral iron twice daily  No active bleeding, on heparin for PE, will switch to oral AC after renal biopsy procedure.  7/8/2024 Patient was transfused 1 unit of blood during hemodialysis.  Hemoglobin 8.8, hematocrit 27.1  S/P 3 units packed RBCs  Start IV Venofer 100 mg with each dialysis treatment for total 10 doses starting 7/10/24     Plan:  Continue to monitor hemoglobin levels  Transfuse for Hgb <8.0

## 2024-07-08 NOTE — ASSESSMENT & PLAN NOTE
History of HTN, recent medication change after dx of ESRD  Workup at that time notable for IgG kappa monoclonal gammopathy and s/p bone marrow biopsy on 6/12/2023 to assess for multiple myeloma. Bone marrow biopsy with no evidence of plasma cell neoplasm.   HD on MWF schedule.   Presented to ED with uncontrolled /80. Endorsed headaches and blurry vision in his left eye in the ED that has since resolved.  BP improved    Plan:  Continue amlodipine 10 mg daily, carvedilol 6.25 mg twice daily, Imdur 60 mg once daily, torsemide 100 mg daily  Hold carvedilol if HR <50  Nephrology and cardiology onbooard   Continue UF with dialysis as BP tolerates

## 2024-07-08 NOTE — CASE MANAGEMENT
Case Management Assessment    Patient name Joaquin Frankel  Location S /S -01 MRN [id number]  : 1980 Date 2024       Current Admission Date: 2024  Current Admission Diagnosis:Chest pain   Patient Active Problem List    Diagnosis Date Noted Date Diagnosed    Pulmonary embolism (HCC) 2024     ESRD (end stage renal disease) on dialysis ([facility]) 2024     Anemia 2024     Type 2 diabetes mellitus ([facility]) 2024     Wound of abdomen 2024     Volume overload 2024     Surgical follow-up care 2024     Urinary problem in male 2024     Noncompliance with diet and medication regimen 2024     Epidermoid cyst of skin of scalp 2024     Stopped smoking with greater than 20 pack year history 2024     CKD (chronic kidney disease) stage 4, GFR 15-29 ml/min ([facility]) 2024     ANGY (obstructive sleep apnea) 2024     Long term (current) use of immunomodulator 2023     Chronic acquired lymphedema 06/15/2022     History of coronary angioplasty with insertion of stent 2022     Anemia of chronic disease 2022     History of hidradenitis suppurativa 2022     Nocturnal hypoxia 2021     Chest pain 2021     Cellulitis of left lower extremity 2021     Morbid obesity ([facility]) 03/10/2021     Hyponatremia 2021     Acute renal failure (ARF) ([facility]) 2021     Diabetes ([facility]) 2018     Other hyperlipidemia 2017     Hypertension 2016     Coronary artery disease with angina pectoris ([facility]) 2014       LOS (days): 4  Geometric Mean LOS (GMLOS) (days): 4  Days to GMLOS:0.2     OBJECTIVE:  PATIENT READMITTED TO HOSPITAL  Risk of Unplanned Readmission Score: 35.93         Current admission status: Inpatient       Preferred Pharmacy:   [id number] - [place], [place] - [address] [address] [address]  Phone: [phone] Fax: [phone]    [facility] - [place], [place] - [address]  [address]  [facility] [address]  Phone: [phone] Fax: [phone]    Primary Care Provider: Hany Mcfarland DO    Primary Insurance: BLUE CROSS  Secondary Insurance:     ASSESSMENT:  Active Health Care Proxies    There are no active Health Care Proxies on file.                      Patient Information  Admitted from:: Home  Mental Status: Alert  During Assessment patient was accompanied by: Not accompanied during assessment  Assessment information provided by:: Patient  Primary Caregiver: Self  Support Systems: Family members  County of Residence: [place]  What [facility] do you live in?: [place]  Home entry access options. Select all that apply.: Stairs  Number of steps to enter home.:  (two flights)  Do the steps have railings?: Yes  Type of Current Residence: Apartment  Floor Level: 2  Upon entering residence, is there a bedroom on the main floor (no further steps)?: Yes  Upon entering residence, is there a bathroom on the main floor (no further steps)?: Yes  Living Arrangements: Lives w/ Son    Activities of Daily Living Prior to Admission  Functional Status: Independent  Completes ADLs independently?: Yes  Ambulates independently?: Yes  Does patient use assisted devices?: Yes  Assisted Devices (DME) used: Straight Cane  Does patient currently own DME?: Yes  What DME does the patient currently own?: Straight Cane  Does patient have a history of Outpatient Therapy (PT/OT)?: Yes  Does the patient have a history of Short-Term Rehab?: No  Does patient have a history of HHC?: No  Does patient currently have HHC?: No         Patient Information Continued  Does patient have prescription coverage?: Yes  Does patient receive dialysis treatments?: No ([facility] M-W-F, 1530 chair time, drives self)         Means of Transportation  [facility]    CM met with patient at bedside re: assessment.  Patient relayed he lives w/ son in second floor apartment, two flights [place]. Patient relayed he is independent with ADLS, has recently been utilizing cane to ambulate, has hx of OPPT, and drives self to appointments. Patient relayed [facility] M-W-F, 1530 chair time. Pt confirmed PCP (Bartolo) and preferred pharmacy ([facility]) for immediate med needs. Pt relayed family/friend to provide transport home when ready for d/c. No CM d/c needs currently identified, CM remains available.     Social Determinants of Health (SDOH)      Flowsheet Row Most Recent Value   Housing Stability    In the last 12 months, was there a time when you were not able to pay the mortgage or rent on time? N   At any time in the past 12 months, were you homeless or living in a shelter (including now)? N   Transportation Needs    In the past 12 months, has lack of transportation kept you from medical appointments or from getting medications? no   In the past 12 months, has lack of transportation kept you from meetings, work, or from getting things needed for daily living? No   Food Insecurity    Within the past 12 months, you worried that your food would run out before you got the money to buy more. Never true   Within the past 12 months, the food you bought just didn't last and you didn't have money to get more. Never true   Utilities    In the past 12 months has the electric, gas, oil, or water company threatened to shut off services in your home? No

## 2024-07-08 NOTE — PLAN OF CARE
Post-Dialysis RN Treatment Note    Blood Pressure:  Pre 112/66 mm/Hg  Post 178/80 mmHg   EDW  181 kg    Weight:  Pre 177 kg   Post 173.9 kg   Mode of weight measurement: Standing Scale   Volume Removed  3,500 ml - Dr. Viramontes ordered to increase the goal from 3L net   Treatment duration 240 minutes    NS given  No    Treatment shortened? No   Medications given during Rx Transfused 1 pack of Leukoreduced RBC   Estimated Kt/V  Not Applicable   Access type: Permacath/TDC   Access Issues: No    Report called to primary nurse   Yes, in person to Amelie MEDINA RN      Started patient on hemodialysis treatment with UF goal of 2-3L net as tolerated per Malgorzata V PA x 4 hours x 3K bath for serum k+ of 4.5 today 7/8/24.    Problem: METABOLIC, FLUID AND ELECTROLYTES - ADULT  Goal: Electrolytes maintained within normal limits  Description: INTERVENTIONS:  - Monitor labs and assess patient for signs and symptoms of electrolyte imbalances  - Administer electrolyte replacement as ordered  - Monitor response to electrolyte replacements, including repeat lab results as appropriate  - Instruct patient on fluid and nutrition as appropriate  Outcome: Progressing  Goal: Fluid balance maintained  Description: INTERVENTIONS:  - Monitor labs   - Monitor I/O and WT  - Instruct patient on fluid and nutrition as appropriate  - Assess for signs & symptoms of volume excess or deficit  Outcome: Progressing

## 2024-07-08 NOTE — ASSESSMENT & PLAN NOTE
Lab Results   Component Value Date    EGFR 8 07/13/2024    EGFR 6 07/12/2024    EGFR 7 07/11/2024    CREATININE 7.33 (H) 07/13/2024    CREATININE 8.84 (H) 07/12/2024    CREATININE 8.13 (H) 07/11/2024     Patient ESRD on Dialysis 3 times a week, MWF  Elevated , c/o of orthopnea   Etiology of CLOVIS: Etiology not entirely clear - Possible MGRS? Pattern of disease progression is not typical for diabetic nephropathy.   Etiology of CKD: Suspected due to diabetic nephropathy, hypertensive nephrosclerosis, obesity related renal dysfunction  Previous baseline creatinine 1.3-1.6 w/ prior episodes of CLOVIS  Serologic workup negative except for IgG kappa on SPEP and UPEP.  Cryoglobulin negative, ANCA negative, anti-dsDNA negative, anti-GBM negative, JOSE ROBERTO negative, C4 34, C3 118  Bone marrow biopsy 6/12/2024: negative for plasma cell neoplasm.   CRRT initiated 6/7/2024.  Peak creatinine 10.4  Last dialyzed 7/12/2024 removed 4 L  Dry weight decreased to 172.5 kg   electrolytes and acid/base stable  7/12 renal biopsy was not performed due to hypertension despite sedation and antihypertensive medications. Tentatively rescheduled for Monday or Tuesday next week.    Plan:  Nephrology onboard  Resume heparin- Hold on 7/15 8AM for potential kidney biopsy   Hold oral AC. Resume ASA 81 mg daily after renal biopsy   NPO after midnight today.  HD per nephro.  Continue torsemide 100 mg daily

## 2024-07-09 LAB
25(OH)D3 SERPL-MCNC: 19.6 NG/ML (ref 30–100)
ABO GROUP BLD BPU: NORMAL
ANION GAP SERPL CALCULATED.3IONS-SCNC: 8 MMOL/L (ref 4–13)
APTT PPP: 50 SECONDS (ref 23–37)
APTT PPP: 72 SECONDS (ref 23–37)
APTT PPP: 86 SECONDS (ref 23–37)
BPU ID: NORMAL
BUN SERPL-MCNC: 32 MG/DL (ref 5–25)
CALCIUM SERPL-MCNC: 8.6 MG/DL (ref 8.4–10.2)
CHLORIDE SERPL-SCNC: 96 MMOL/L (ref 96–108)
CO2 SERPL-SCNC: 30 MMOL/L (ref 21–32)
CREAT SERPL-MCNC: 8.05 MG/DL (ref 0.6–1.3)
CROSSMATCH: NORMAL
GFR SERPL CREATININE-BSD FRML MDRD: 7 ML/MIN/1.73SQ M
GLUCOSE SERPL-MCNC: 82 MG/DL (ref 65–140)
GLUCOSE SERPL-MCNC: 84 MG/DL (ref 65–140)
GLUCOSE SERPL-MCNC: 93 MG/DL (ref 65–140)
GLUCOSE SERPL-MCNC: 94 MG/DL (ref 65–140)
GLUCOSE SERPL-MCNC: 97 MG/DL (ref 65–140)
POTASSIUM SERPL-SCNC: 4 MMOL/L (ref 3.5–5.3)
SODIUM SERPL-SCNC: 134 MMOL/L (ref 135–147)
UNIT DISPENSE STATUS: NORMAL
UNIT PRODUCT CODE: NORMAL
UNIT PRODUCT VOLUME: 350 ML
UNIT RH: NORMAL

## 2024-07-09 PROCEDURE — 99232 SBSQ HOSP IP/OBS MODERATE 35: CPT | Performed by: INTERNAL MEDICINE

## 2024-07-09 PROCEDURE — 85730 THROMBOPLASTIN TIME PARTIAL: CPT | Performed by: INTERNAL MEDICINE

## 2024-07-09 PROCEDURE — 80048 BASIC METABOLIC PNL TOTAL CA: CPT

## 2024-07-09 PROCEDURE — 82306 VITAMIN D 25 HYDROXY: CPT | Performed by: INTERNAL MEDICINE

## 2024-07-09 PROCEDURE — 82948 REAGENT STRIP/BLOOD GLUCOSE: CPT

## 2024-07-09 RX ADMIN — OXYCODONE HYDROCHLORIDE 10 MG: 10 TABLET ORAL at 14:25

## 2024-07-09 RX ADMIN — HEPARIN SODIUM 30 UNITS/KG/HR: 10000 INJECTION, SOLUTION INTRAVENOUS at 16:44

## 2024-07-09 RX ADMIN — HEPARIN SODIUM 30 UNITS/KG/HR: 10000 INJECTION, SOLUTION INTRAVENOUS at 22:54

## 2024-07-09 RX ADMIN — OXYCODONE HYDROCHLORIDE 10 MG: 10 TABLET ORAL at 10:28

## 2024-07-09 RX ADMIN — HEPARIN SODIUM 28 UNITS/KG/HR: 10000 INJECTION, SOLUTION INTRAVENOUS at 02:25

## 2024-07-09 RX ADMIN — OXYCODONE HYDROCHLORIDE 5 MG: 5 TABLET ORAL at 20:09

## 2024-07-09 RX ADMIN — SEVELAMER HYDROCHLORIDE 800 MG: 800 TABLET ORAL at 16:43

## 2024-07-09 RX ADMIN — OXYCODONE HYDROCHLORIDE 5 MG: 5 TABLET ORAL at 08:04

## 2024-07-09 RX ADMIN — FERROUS SULFATE TAB 325 MG (65 MG ELEMENTAL FE) 325 MG: 325 (65 FE) TAB at 08:00

## 2024-07-09 RX ADMIN — TORSEMIDE 100 MG: 100 TABLET ORAL at 08:01

## 2024-07-09 RX ADMIN — FERROUS SULFATE TAB 325 MG (65 MG ELEMENTAL FE) 325 MG: 325 (65 FE) TAB at 16:43

## 2024-07-09 RX ADMIN — SEVELAMER HYDROCHLORIDE 800 MG: 800 TABLET ORAL at 08:00

## 2024-07-09 RX ADMIN — HEPARIN SODIUM 28 UNITS/KG/HR: 10000 INJECTION, SOLUTION INTRAVENOUS at 10:29

## 2024-07-09 RX ADMIN — ATORVASTATIN CALCIUM 80 MG: 40 TABLET, FILM COATED ORAL at 08:00

## 2024-07-09 RX ADMIN — CARVEDILOL 6.25 MG: 6.25 TABLET, FILM COATED ORAL at 16:43

## 2024-07-09 RX ADMIN — ISOSORBIDE MONONITRATE 60 MG: 60 TABLET, EXTENDED RELEASE ORAL at 08:00

## 2024-07-09 RX ADMIN — AMLODIPINE BESYLATE 10 MG: 10 TABLET ORAL at 08:00

## 2024-07-09 RX ADMIN — OXYCODONE HYDROCHLORIDE 10 MG: 10 TABLET ORAL at 02:26

## 2024-07-09 RX ADMIN — HEPARIN SODIUM 5000 UNITS: 1000 INJECTION INTRAVENOUS; SUBCUTANEOUS at 11:30

## 2024-07-09 RX ADMIN — SEVELAMER HYDROCHLORIDE 800 MG: 800 TABLET ORAL at 11:35

## 2024-07-09 RX ADMIN — CARVEDILOL 6.25 MG: 6.25 TABLET, FILM COATED ORAL at 08:00

## 2024-07-09 NOTE — PROGRESS NOTES
[facility]  Progress Note  Name: Joaquin Frankel I  MRN: [id number]  Unit/Bed#: S -01 I Date of Admission: 7/4/2024   Date of Service: 7/9/2024 I Hospital Day: 5    Assessment & Plan   * Chest pain  Assessment & Plan  Patient presented with worsening substernal chest pain over several weeks.  H/o chronic angina, CAD, family history of hereditary heart disease on fathers side.   Patient was admitted for similar chest pain in early June and was found to have ESRD, started on HD MWF.  Recent nuclear stress test without significant reversible ischemia  EKG on admission: Sinus Rhythm with PAC's. Repeat EKG showed sinus bradycardia  High-sensitivity troponin negative x 5   . D-Dimer 4.21  CTA: subsegmental right lower lobe pulmonary embolism  07/05/24: Updated echocardiogram on unremarkable  Patient blood pressure elevated on admission to 194/80, currently stable.  Patient received 2 doses of nitroglycerin in ED and reports it did not improve his pain, but it improved his elevated blood pressure.  Patient was saturating low 80s on room air on admission and required 3L supplemental O2, which improved saturation to 90s. O2 Sat currently 94% to 95% on room air.  MRSA negative. Viral panel negative.    Multifactorial from acute on chronic anemia, hypertensive urgency, and acute PE   Patient's chest pain has improved following hemodialysis and the improvement of high blood pressure.     Plan-  Per cardiology recs: Continue amlodipine 5 mg daily,Coreg 6.25 mg BID, Imdur 60 mg daily, atorvastatin 80 mg daily. Holding aspirin 81 mg daily  Discontinue telemetry    Pulmonary embolism (HCC)  Assessment & Plan  Patient presented for worsening chest pain, shortness of breath, and dyspnea on exertion.  Patient was admitted for similar chest pain in early June and was found to have ESRD, started on HD MWF   D-Dimer 4.21  CTA showed subsegmental right lower lobe pulmonary embolism  Placed on  heparin drip  Patient no longer on nasal cannula    Plan-  Continue Heparin drip. To be held morning of renal biopsy (07/12/2024)  Transition to oral anticoagulation after renal biopsy   Supplemental oxygen as needed    ESRD (end stage renal disease) on dialysis (HCC)  Assessment & Plan  Lab Results   Component Value Date    EGFR 4 07/08/2024    EGFR 5 07/07/2024    EGFR 6 07/06/2024    CREATININE 11.13 (H) 07/08/2024    CREATININE 10.20 (H) 07/07/2024    CREATININE 8.43 (H) 07/06/2024     Patient ESRD on Dialysis 3 times a week, MWF  Elevated , c/o of orthopnea   Etiology of CLOVIS: Etiology not entirely clear - Possible MGRS? Pattern of disease progression is not typical for diabetic nephropathy.   Etiology of CKD: Suspected due to diabetic nephropathy, hypertensive nephrosclerosis, obesity related renal dysfunction  Previous baseline creatinine 1.3-1.6 w/ prior episodes of CLOVIS  Serologic workup negative except for IgG kappa on SPEP and UPEP.  Cryoglobulin negative, ANCA negative, anti-dsDNA negative, anti-GBM negative, JOSE ROBERTO negative, C4 34, C3 118  Bone marrow biopsy 6/12/2024: negative for plasma cell neoplasm.   CRRT initiated 6/7/2024.  Peak creatinine 10.4  Last treatment 7/8/2024 with UF 3.5 L  EDW: 181 kg  electrolytes and acid/base stable  BUN 32 Creatinine 8.05 today    Plan:  Nephrology onboard  IR guided renal biopsy rescheduled for Friday 07/12/2024.Hold heparin that morning.  Hold oral AC. Resume ASA 81 mg daily after renal biopsy   Continue torsemide 100 mg daily    Hypertension  Assessment & Plan  History of HTN, recent medication change after dx of ESRD  Workup at that time notable for IgG kappa monoclonal gammopathy and s/p bone marrow biopsy on 6/12/2023 to assess for multiple myeloma. Bone marrow biopsy with no evidence of plasma cell neoplasm.   HD on MWF schedule.   Presented to ED with uncontrolled /80. Endorsed headaches and blurry vision in his left eye in the ED that has since  resolved.  BP improved but still suboptimal.     Plan:  Continue amlodipine 10 mg daily, carvedilol 6.25 mg twice daily, Imdur 60 mg once daily, torsemide 100 mg daily  Hold carvedilol if HR <50  Nephrology and cardiology onbooard   Continue UF with dialysis as BP tolerates     Type 2 diabetes mellitus (HCC)  Assessment & Plan  Lab Results   Component Value Date    HGBA1C 5.7 (H) 06/09/2024       Recent Labs     07/08/24  2116 07/09/24  0730 07/09/24  1130 07/09/24  1520   POCGLU 103 82 93 97       Blood Sugar Average: Last 72 hrs:  (P) 96.4      Anemia  Assessment & Plan  Patient has history of anemia of chronic disease, ESRD  CBC on admission showed hgb 6.6 / hct 21.2  Received 2 units packed RBC's in ED. Hbg 7.6 after transfusion  Hematology following with outpatient f/u scheduled 7/11/2024, patient will reschedule due to overlapping hospital admission   On oral iron twice daily  No active bleeding, on heparin for PE, will switch to oral AC after renal biopsy procedure.  7/8/2024 Patient was transfused 1 unit of blood during hemodialysis.  Hemoglobin 8.8, hematocrit 27.1  S/P 3 units packed RBCs    Plan:  Continue to monitor hemoglobin levels  Transfuse for Hgb <8.0    ANGY (obstructive sleep apnea)  Assessment & Plan  Overnight pulse oximetry obtained: highest O2 sat 99%, his lowest O2 sat 73%, with a mean of 91%. Patient had 120 desaturation events <89%.  He spent an 1 hour and 15 minutes in desaturation (~15.82%) and and 1 hour and 34 minutes with desaturation <89% (~19.77%).  Counseled patient on importance of maintaining oxygen saturation as oxygen deprivation can lead to hypertension, heart failure, coronary artery disease, pulmonary hypertension, etc.    Patient was advised of need to wear oxygen at night upon discharge. He declines stating he would like to wait for formal evaluation via sleep study as an outpatient.    Chronic acquired lymphedema  Assessment & Plan  Chronic LLE lymphedema x 3 years after  surgical resection of LLE cyst    Plan:  Advised compression and elevation of lower extremity for symptomatic edema relief    Acute respiratory failure with hypoxia (HCC)-resolved as of 2024  Assessment & Plan  Patient O2 saturation on admission was 83% on RA  Was placed on 3/L Nasal canula FiO2 32%  CTA showed right lower lobe subsegmental PE  Was placed on heparin  Pt currently on RA    Plan:  Supplemental oxygen as needed  Continue heparin for PE  Incentive Spirometry           VTE Pharmacologic Prophylaxis: VTE Score: 7 High Risk (Score >/= 5) - Pharmacological DVT Prophylaxis Ordered: heparin drip. Sequential Compression Devices Ordered.    Mobility:   Basic Mobility Inpatient Raw Score: 24  JH-HLM Goal: 8: Walk 250 feet or more  JH-HLM Achieved: 5: Stand (1 or more minutes)  JH-HLM Goal NOT achieved. Continue with multidisciplinary rounding and encourage appropriate mobility to improve upon JH-HLM goals.    Patient Centered Rounds: I performed bedside rounds with nursing staff today.   Discussions with Specialists or Other Care Team Provider: cardiology, nephrology    Education and Discussions with Family / Patient: Patient declined call to .     Current Length of Stay: 5 day(s)  Current Patient Status: Inpatient   Discharge Plan: Anticipate discharge in 48-72 hrs to home.    Code Status: Level 1 - Full Code    Subjective:   Patient was seen and evaluated at bedside.  No overnight events.  He endorses improvement of chest pain as well as shortness of breath.  States he has been increasing his ambulation with minimal symptoms, so telemetry will be discontinued.  He has no other complaints at this time.    Objective:     Vitals:   Temp (24hrs), Av.2 °F (36.8 °C), Min:97.5 °F (36.4 °C), Max:98.8 °F (37.1 °C)    Temp:  [97.5 °F (36.4 °C)-98.8 °F (37.1 °C)] 98.2 °F (36.8 °C)  HR:  [49-70] 70  Resp:  [15-21] 21  BP: (138-178)/(67-91) 138/67  SpO2:  [93 %-98 %] 94 %  Body mass index is 60.32  kg/m².     Input and Output Summary (last 24 hours):     Intake/Output Summary (Last 24 hours) at 7/9/2024 1640  Last data filed at 7/9/2024 1601  Gross per 24 hour   Intake 2750 ml   Output 5360 ml   Net -2610 ml       Physical Exam:   Physical Exam  Constitutional:       General: He is not in acute distress.     Appearance: He is obese. He is not ill-appearing.   HENT:      Head: Normocephalic and atraumatic.      Right Ear: External ear normal.      Left Ear: External ear normal.      Nose: Nose normal.      Mouth/Throat:      Mouth: Mucous membranes are moist.      Pharynx: Oropharynx is clear.   Eyes:      Extraocular Movements: Extraocular movements intact.      Conjunctiva/sclera: Conjunctivae normal.   Cardiovascular:      Rate and Rhythm: Regular rhythm. Bradycardia present.      Pulses: Normal pulses.      Heart sounds: Normal heart sounds. No murmur heard.     No gallop.   Pulmonary:      Effort: Pulmonary effort is normal. No respiratory distress.      Breath sounds: Normal breath sounds. No wheezing.      Comments: Tolerating room air  Abdominal:      General: Bowel sounds are normal. There is no distension.      Palpations: Abdomen is soft.      Tenderness: There is no abdominal tenderness.   Musculoskeletal:         General: Normal range of motion.      Cervical back: Normal range of motion and neck supple. No rigidity.      Right lower leg: Edema (violaceous skin changes right lower extremity, punctate wounds healing over right shin) present.      Left lower leg: Edema (lymphedema) present.      Comments: LLE significantly larger than RLE due to chronic lymphedema. Old healed surgical incision noted to left calf     Skin:     General: Skin is warm and dry.      Comments: R IJV PermCath site clear. Dressing intact  Abrasion to right anterior ankle noted   Neurological:      General: No focal deficit present.      Mental Status: He is alert and oriented to person, place, and time. Mental status is at  baseline.   Psychiatric:         Mood and Affect: Mood normal.          Additional Data:     Labs:  Results from last 7 days   Lab Units 07/08/24  2021 07/08/24  0518   WBC Thousand/uL  --  6.73   HEMOGLOBIN g/dL 8.8* 7.8*   HEMATOCRIT % 27.1* 25.0*   PLATELETS Thousands/uL  --  267   SEGS PCT %  --  71   LYMPHO PCT %  --  14   MONO PCT %  --  8   EOS PCT %  --  6     Results from last 7 days   Lab Units 07/09/24  0433 07/05/24  0411 07/04/24  1538   SODIUM mmol/L 134*   < > 135   POTASSIUM mmol/L 4.0   < > 4.5   CHLORIDE mmol/L 96   < > 96   CO2 mmol/L 30   < > 27   BUN mg/dL 32*   < > 27*   CREATININE mg/dL 8.05*   < > 9.02*   ANION GAP mmol/L 8   < > 12   CALCIUM mg/dL 8.6   < > 9.0   ALBUMIN g/dL  --   --  3.1*   TOTAL BILIRUBIN mg/dL  --   --  0.41   ALK PHOS U/L  --   --  90   ALT U/L  --   --  <3*   AST U/L  --   --  15   GLUCOSE RANDOM mg/dL 84   < > 89    < > = values in this interval not displayed.     Results from last 7 days   Lab Units 07/04/24  2013   INR  1.18     Results from last 7 days   Lab Units 07/09/24  1520 07/09/24  1130 07/09/24  0730 07/08/24  2116 07/08/24  1541 07/08/24  1058 07/08/24  0742 07/07/24  2113 07/07/24  1542 07/07/24  1119 07/07/24  0718 07/06/24  2104   POC GLUCOSE mg/dl 97 93 82 103 112 99 76 118 102 86 80 115               Lines/Drains:  Invasive Devices       Peripheral Intravenous Line  Duration             Peripheral IV 07/04/24 Dorsal (posterior);Left Forearm 4 days    Peripheral IV 07/09/24 Right;Lower Arm <1 day              Hemodialysis Catheter  Duration             HD Permanent Double Catheter 25 days                    Imaging: Reviewed radiology reports from this admission including: chest xray, abdominal/pelvic CT, and ECHO    Recent Cultures (last 7 days):         Last 24 Hours Medication List:   Current Facility-Administered Medications   Medication Dose Route Frequency Provider Last Rate    acetaminophen  650 mg Oral Q6H PRN Mily Martin MD      amLODIPine   10 mg Oral Daily Andrea Longoria MD      atorvastatin  80 mg Oral Daily Mily Martin MD      carvedilol  6.25 mg Oral BID With Meals Andrea Longoria MD      ferrous sulfate  325 mg Oral BID With Meals Mily Martin MD      heparin (porcine)  3-30 Units/kg/hr (Order-Specific) Intravenous Titrated Gustavo Neville MD 30 Units/kg/hr (07/09/24 1133)    heparin (porcine)  10,000 Units Intravenous Q6H PRN Monique Rothman DO      heparin (porcine)  5,000 Units Intravenous Q6H PRN Monique Rothman DO      isosorbide mononitrate  60 mg Oral Daily Mily Martin MD      labetalol  10 mg Intravenous Q6H PRN Liana Riley MD      lidocaine  1 patch Topical Daily Mily Martin MD      oxyCODONE  10 mg Oral Q4H PRN Lorri Maria MD      oxyCODONE  5 mg Oral Q4H PRN Lorri Maria MD      sevelamer  800 mg Oral TID With Meals Mily Martin MD      torsemide  100 mg Oral Daily Charles Spear MD          Today, Patient Was Seen By: Deya Palomino MD    **Please Note: This note may have been constructed using a voice recognition system.**

## 2024-07-09 NOTE — PROCEDURES
"Venous Access Line Insertion    Date/Time: 7/9/2024 12:08 PM    Performed by: Cesia Tolbert RN  Authorized by: Job Gutierrez MD    Patient location:  Bedside  Holden protocol:     Procedure explained and questions answered to patient or proxy's satisfaction: yes    Pre-procedure details:     Hand hygiene: Hand hygiene performed prior to insertion      Sterile barrier technique: All elements of maximal sterile technique followed      Skin preparation:  Alcohol and ChloraPrep    Skin preparation agent: Skin preparation agent completely dried prior to procedure    Procedure details:     Complex Venous Access Line Type: US Guided Peripheral IV      Orientation:  Right and lower    Location:  Arm    Catheter size:  20 gauge (1.88\")    Patient evaluated for contraindications to access (i.e. fistula, thrombosis, etc): Yes      Sterile ultrasound techniques: Sterile gel and sterile probe covers were used      Number of attempts:  1    Successful placement: yes      Landmarks identified: yes    Anesthesia (see MAR for exact dosages):     Anesthesia method:  None  Post-procedure details:     Post-procedure:  Dressing applied    Assessment:  Blood return through all ports    Post-procedure complications: none      Patient tolerance of procedure:  Tolerated well, no immediate complications          "

## 2024-07-09 NOTE — PROGRESS NOTES
NEPHROLOGY PROGRESS NOTE   Joaquin Frankel 44 y.o. male MRN: [id number]  Unit/Bed#: S -01 Encounter: [id number]    ASSESSMENT & PLAN:  44-year-old male presented with complaint of chest pain.  Nephrology was consulted for acute kidney injury on HD.  Recent hospital admission at [facility] between Tori 3 Tori 26 during that hospital admission he had a admission creatinine of 6.4 and was started on intermittent HD on 6/7/24 and has been on dialysis dependent since then.  He now presented with complaint of chest pain and CTA on admission revealed PE.    Acute kidney injury on HD, POA  -History of acute kidney injury previous hospital admission in June 2024 and on HD since 6/7/2024.  Current outpatient dialysis clinic [facility] Monday Wednesday Friday.  -Access right IJ PermCath  -Serum creatinine was 1.22 mg/dL in February 2024, increased to 2.98 in April 2024 and then increased to 6.4 in June 2024.  Serological workup was negative except for IgG kappa on SPEP and UPEP.  Cryoglobulin negative, ANCA negative anti-double-stranded DNA negative anti-GBM negative JOSE ROBERTO negative, C3-C4 at normal range.  Bone marrow biopsy done on June 12, 2024 did not reveal a plasma cell neoplasm.  -Etiology: Exact etiology not clear.  Patient does admit to using NSAIDs which could be contributing to CLOVIS.  Also there is possibility of MGRS -plan for kidney biopsy  Plan:  Plan for kidney biopsy this week on Friday  as per my discussion with IR  Continue to hold aspirin.  Recommend holding heparin at 10 AM on Friday and to keep n.p.o. after midnight on Thursday.  Last hemodialysis treatment was on 7/8, continue UF with HD.  Current estimated dry weight 181 kg    Chronic kidney disease stage III: Baseline creatinine 1.2 to 1.5 mg/dL.  No prior renal follow-up.  History of nephrotic range proteinuria in April 2024    Primary hypertension: Blood pressure elevated but acceptable.  Continue current treatment.  Avoid hypotension.   Currently on amlodipine, Coreg, torsemide    Hyponatremia likely due to fluid overload, sodium improving to 134, continue to monitor, recommend fluid restriction    Fluid overload: Continue UF with HD   -continue to challenge target weight.  Patient has left lower extremity lymphedema but also has trace right lower extremity edema     Anemia, unspecified  -no JOSSELYN due to acute PE.  Iron saturation 17%  -Hemoglobin improved to 8.8 g/dL, status post PRBC.  Continue to monitor    Nephrotic range proteinuria  -Albumin creatinine ratio was 5.2 g in April 2024, plan for kidney biopsy as mentioned above    IgG kappa monoclonal gammopathy, bone marrow biopsy on June 12, 2024 did not reveal plasma cell neoplasm.  Plan for kidney biopsy as mentioned above    CKD/MBD/hyperphosphatemia   -continue sevelamer.  Phosphorus is elevated at 5.8, recommend low phosphorus diet.    Pulmonary embolism: On heparin drip.  Hold off on NOAC due to need for kidney biopsy    Left lower extremity lymphedema recommend limb elevation   continue UF with HD    Complaint of chest pain, history of CAD, cardiology on board,  repeat nuclear stress test without significant reversible ischemia.  As per cardiology note most likely due to acute PE chronic anemia and hypertension causing chest pain     Discussed with primary team regarding plan for next hemodialysis treatment tomorrow and plan for kidney biopsy on Friday and agreed with the plan      SUBJECTIVE:  No new complaints.  No chest pain or shortness of breath    OBJECTIVE:  Current Weight: Weight - Scale: (!) 175 kg (385 lb 2.3 oz)  Vitals:    07/09/24 0800   BP: 149/68   Pulse: 55   Resp:    Temp:    SpO2:        Intake/Output Summary (Last 24 hours) at 7/9/2024 1244  Last data filed at 7/9/2024 0500  Gross per 24 hour   Intake 2150 ml   Output 4660 ml   Net -2510 ml       Physical Exam  General:  Ill looking, awake.  Eyes: Conjunctivae pink,  Sclera anicteric  ENT: lips and mucous membranes  moist  Neck: supple   Chest: Clear to Auscultation both lungs,  no crackles, ronchus or wheezing.  CVS: S1 & S2 present, normal rate, regular rhythm, no murmur.  Abdomen: soft, non-tender, non-distended, Bowel sounds normoactive  Extremities: 1 + edema right leg , left leg Lymphedema   Skin: no rash  Neuro: awake, alert, oriented x 3   Psych: Mood and affect appropriate           Medications:    Current Facility-Administered Medications:     acetaminophen (TYLENOL) tablet 650 mg, 650 mg, Oral, Q6H PRN, Mily Martin MD, 650 mg at 07/05/24 1251    amLODIPine (NORVASC) tablet 10 mg, 10 mg, Oral, Daily, Andrea Longoria MD, 10 mg at 07/09/24 0800    atorvastatin (LIPITOR) tablet 80 mg, 80 mg, Oral, Daily, Mily Martin MD, 80 mg at 07/09/24 0800    carvedilol (COREG) tablet 6.25 mg, 6.25 mg, Oral, BID With Meals, Andrea Longoria MD, 6.25 mg at 07/09/24 0800    ferrous sulfate tablet 325 mg, 325 mg, Oral, BID With Meals, Mily Martin MD, 325 mg at 07/09/24 0800    heparin (porcine) 25,000 units in 0.45% NaCl 250 mL infusion (premix), 3-30 Units/kg/hr (Order-Specific), Intravenous, Titrated, Gustavo Neville MD, Last Rate: 37.5 mL/hr at 07/09/24 1133, 30 Units/kg/hr at 07/09/24 1133    heparin (porcine) injection 10,000 Units, 10,000 Units, Intravenous, Q6H PRN, Monique Rothman DO    heparin (porcine) injection 5,000 Units, 5,000 Units, Intravenous, Q6H PRN, Monique Rothman DO, 5,000 Units at 07/09/24 1130    isosorbide mononitrate (IMDUR) 24 hr tablet 60 mg, 60 mg, Oral, Daily, Mily Martin MD, 60 mg at 07/09/24 0800    labetalol (NORMODYNE) injection 10 mg, 10 mg, Intravenous, Q6H PRN, Liana Riley MD    lidocaine (LIDODERM) 5 % patch 1 patch, 1 patch, Topical, Daily, Mily Martin MD    oxyCODONE (ROXICODONE) immediate release tablet 10 mg, 10 mg, Oral, Q4H PRN, Lorri Maria MD, 10 mg at 07/09/24 1028    oxyCODONE (ROXICODONE) IR tablet 5 mg, 5 mg, Oral, Q4H PRN,  "Lorri Maira MD, 5 mg at 07/09/24 0804    sevelamer (RENAGEL) tablet 800 mg, 800 mg, Oral, TID With Meals, Mily Martin MD, 800 mg at 07/09/24 1135    torsemide (DEMADEX) tablet 100 mg, 100 mg, Oral, Daily, Charles Spear MD, 100 mg at 07/09/24 0801    Invasive Devices:        Lab Results:   Results from last 7 days   Lab Units 07/09/24  0433 07/08/24  2021 07/08/24  0518 07/07/24  0515 07/06/24  0456 07/05/24  0411 07/04/24  1538   WBC Thousand/uL  --   --  6.73 7.09 6.83   < > 6.92   HEMOGLOBIN g/dL  --  8.8* 7.8* 7.9* 7.5*   < > 6.6*   HEMATOCRIT %  --  27.1* 25.0* 25.2* 24.6*   < > 21.2*   PLATELETS Thousands/uL  --   --  267 240 214   < > 254   POTASSIUM mmol/L 4.0  --  4.5 4.2 4.2   < > 4.5   CHLORIDE mmol/L 96  --  95* 97 96   < > 96   CO2 mmol/L 30  --  25 25 29   < > 27   BUN mg/dL 32*  --  51* 45* 33*   < > 27*   CREATININE mg/dL 8.05*  --  11.13* 10.20* 8.43*   < > 9.02*   CALCIUM mg/dL 8.6  --  8.9 8.8 8.8   < > 9.0   MAGNESIUM mg/dL  --   --   --   --   --   --  1.9   PHOSPHORUS mg/dL  --   --  5.8*  --  4.6*  --   --    ALK PHOS U/L  --   --   --   --   --   --  90   ALT U/L  --   --   --   --   --   --  <3*   AST U/L  --   --   --   --   --   --  15    < > = values in this interval not displayed.       Previous work up:         Portions of the record may have been created with voice recognition software. Occasional wrong word or \"sound a like\" substitutions may have occurred due to the inherent limitations of voice recognition software. Read the chart carefully and recognize, using context, where substitutions have occurred.If you have any questions, please contact the dictating provider.    "

## 2024-07-09 NOTE — PLAN OF CARE
Problem: Potential for Falls  Goal: Patient will remain free of falls  Description: INTERVENTIONS:  - Educate patient/family on patient safety including physical limitations  - Instruct patient to call for assistance with activity   - Consult OT/PT to assist with strengthening/mobility   - Keep Call bell within reach  - Keep bed low and locked with side rails adjusted as appropriate  - Keep care items and personal belongings within reach  - Initiate and maintain comfort rounds  - Make Fall Risk Sign visible to staff  - Offer Toileting every 2 Hours, in advance of need  - Initiate/Maintain bed and chair alarm  - Obtain necessary fall risk management equipment:   - Apply yellow socks and bracelet for high fall risk patients  - Consider moving patient to room near nurses station  Outcome: Progressing     Problem: METABOLIC, FLUID AND ELECTROLYTES - ADULT  Goal: Electrolytes maintained within normal limits  Description: INTERVENTIONS:  - Monitor labs and assess patient for signs and symptoms of electrolyte imbalances  - Administer electrolyte replacement as ordered  - Monitor response to electrolyte replacements, including repeat lab results as appropriate  - Instruct patient on fluid and nutrition as appropriate  Outcome: Progressing  Goal: Fluid balance maintained  Description: INTERVENTIONS:  - Monitor labs   - Monitor I/O and WT  - Instruct patient on fluid and nutrition as appropriate  - Assess for signs & symptoms of volume excess or deficit  Outcome: Progressing     Problem: Prexisting or High Potential for Compromised Skin Integrity  Goal: Skin integrity is maintained or improved  Description: INTERVENTIONS:  - Identify patients at risk for skin breakdown  - Assess and monitor skin integrity  - Assess and monitor nutrition and hydration status  - Monitor labs   - Assess for incontinence   - Turn and reposition patient  - Assist with mobility/ambulation  - Relieve pressure over bony prominences  - Avoid  friction and shearing  - Provide appropriate hygiene as needed including keeping skin clean and dry  - Evaluate need for skin moisturizer/barrier cream  - Collaborate with interdisciplinary team   - Patient/family teaching  - Consider wound care consult   Outcome: Progressing     Problem: Nutrition/Hydration-ADULT  Goal: Nutrient/Hydration intake appropriate for improving, restoring or maintaining nutritional needs  Description: Monitor and assess patient's nutrition/hydration status for malnutrition. Collaborate with interdisciplinary team and initiate plan and interventions as ordered.  Monitor patient's weight and dietary intake as ordered or per policy. Utilize nutrition screening tool and intervene as necessary. Determine patient's food preferences and provide high-protein, high-caloric foods as appropriate.     INTERVENTIONS:  - Monitor oral intake, urinary output, labs, and treatment plans  - Assess nutrition and hydration status and recommend course of action  - Evaluate amount of meals eaten  - Assist patient with eating if necessary   - Allow adequate time for meals  - Recommend/ encourage appropriate diets, oral nutritional supplements, and vitamin/mineral supplements  - Order, calculate, and assess calorie counts as needed  - Recommend, monitor, and adjust tube feedings and TPN/PPN based on assessed needs  - Assess need for intravenous fluids  - Provide specific nutrition/hydration education as appropriate  - Include patient/family/caregiver in decisions related to nutrition  Outcome: Progressing

## 2024-07-10 ENCOUNTER — APPOINTMENT (INPATIENT)
Dept: DIALYSIS | Facility: HOSPITAL | Age: 44
DRG: 175 | End: 2024-07-10
Payer: COMMERCIAL

## 2024-07-10 LAB
ANION GAP SERPL CALCULATED.3IONS-SCNC: 8 MMOL/L (ref 4–13)
APTT PPP: 50 SECONDS (ref 23–37)
APTT PPP: 66 SECONDS (ref 23–37)
APTT PPP: 93 SECONDS (ref 23–37)
BASOPHILS # BLD AUTO: 0.04 THOUSANDS/ÂΜL (ref 0–0.1)
BASOPHILS NFR BLD AUTO: 1 % (ref 0–1)
BUN SERPL-MCNC: 41 MG/DL (ref 5–25)
CALCIUM SERPL-MCNC: 9.1 MG/DL (ref 8.4–10.2)
CHLORIDE SERPL-SCNC: 96 MMOL/L (ref 96–108)
CO2 SERPL-SCNC: 28 MMOL/L (ref 21–32)
CREAT SERPL-MCNC: 9.56 MG/DL (ref 0.6–1.3)
EOSINOPHIL # BLD AUTO: 0.52 THOUSAND/ÂΜL (ref 0–0.61)
EOSINOPHIL NFR BLD AUTO: 8 % (ref 0–6)
ERYTHROCYTE [DISTWIDTH] IN BLOOD BY AUTOMATED COUNT: 14.2 % (ref 11.6–15.1)
GFR SERPL CREATININE-BSD FRML MDRD: 5 ML/MIN/1.73SQ M
GLUCOSE SERPL-MCNC: 77 MG/DL (ref 65–140)
GLUCOSE SERPL-MCNC: 79 MG/DL (ref 65–140)
GLUCOSE SERPL-MCNC: 90 MG/DL (ref 65–140)
HCT VFR BLD AUTO: 25.9 % (ref 36.5–49.3)
HGB BLD-MCNC: 8.1 G/DL (ref 12–17)
IMM GRANULOCYTES # BLD AUTO: 0.04 THOUSAND/UL (ref 0–0.2)
IMM GRANULOCYTES NFR BLD AUTO: 1 % (ref 0–2)
LYMPHOCYTES # BLD AUTO: 1.18 THOUSANDS/ÂΜL (ref 0.6–4.47)
LYMPHOCYTES NFR BLD AUTO: 18 % (ref 14–44)
MCH RBC QN AUTO: 29.1 PG (ref 26.8–34.3)
MCHC RBC AUTO-ENTMCNC: 31.3 G/DL (ref 31.4–37.4)
MCV RBC AUTO: 93 FL (ref 82–98)
MONOCYTES # BLD AUTO: 0.77 THOUSAND/ÂΜL (ref 0.17–1.22)
MONOCYTES NFR BLD AUTO: 12 % (ref 4–12)
NEUTROPHILS # BLD AUTO: 3.98 THOUSANDS/ÂΜL (ref 1.85–7.62)
NEUTS SEG NFR BLD AUTO: 60 % (ref 43–75)
NRBC BLD AUTO-RTO: 0 /100 WBCS
PLATELET # BLD AUTO: 233 THOUSANDS/UL (ref 149–390)
PMV BLD AUTO: 9.1 FL (ref 8.9–12.7)
POTASSIUM SERPL-SCNC: 4.3 MMOL/L (ref 3.5–5.3)
RBC # BLD AUTO: 2.78 MILLION/UL (ref 3.88–5.62)
SODIUM SERPL-SCNC: 132 MMOL/L (ref 135–147)
WBC # BLD AUTO: 6.53 THOUSAND/UL (ref 4.31–10.16)

## 2024-07-10 PROCEDURE — 99232 SBSQ HOSP IP/OBS MODERATE 35: CPT | Performed by: INTERNAL MEDICINE

## 2024-07-10 PROCEDURE — 85730 THROMBOPLASTIN TIME PARTIAL: CPT | Performed by: INTERNAL MEDICINE

## 2024-07-10 PROCEDURE — 82948 REAGENT STRIP/BLOOD GLUCOSE: CPT

## 2024-07-10 PROCEDURE — 80048 BASIC METABOLIC PNL TOTAL CA: CPT | Performed by: INTERNAL MEDICINE

## 2024-07-10 PROCEDURE — 85025 COMPLETE CBC W/AUTO DIFF WBC: CPT | Performed by: INTERNAL MEDICINE

## 2024-07-10 RX ADMIN — IRON SUCROSE 100 MG: 20 INJECTION, SOLUTION INTRAVENOUS at 20:52

## 2024-07-10 RX ADMIN — OXYCODONE HYDROCHLORIDE 10 MG: 10 TABLET ORAL at 18:04

## 2024-07-10 RX ADMIN — HEPARIN SODIUM 5000 UNITS: 1000 INJECTION INTRAVENOUS; SUBCUTANEOUS at 11:14

## 2024-07-10 RX ADMIN — SEVELAMER HYDROCHLORIDE 800 MG: 800 TABLET ORAL at 11:14

## 2024-07-10 RX ADMIN — HEPARIN SODIUM 30 UNITS/KG/HR: 10000 INJECTION, SOLUTION INTRAVENOUS at 13:41

## 2024-07-10 RX ADMIN — FERROUS SULFATE TAB 325 MG (65 MG ELEMENTAL FE) 325 MG: 325 (65 FE) TAB at 18:05

## 2024-07-10 RX ADMIN — SEVELAMER HYDROCHLORIDE 800 MG: 800 TABLET ORAL at 08:24

## 2024-07-10 RX ADMIN — CARVEDILOL 6.25 MG: 6.25 TABLET, FILM COATED ORAL at 18:05

## 2024-07-10 RX ADMIN — OXYCODONE HYDROCHLORIDE 10 MG: 10 TABLET ORAL at 08:27

## 2024-07-10 RX ADMIN — OXYCODONE HYDROCHLORIDE 5 MG: 5 TABLET ORAL at 22:38

## 2024-07-10 RX ADMIN — ISOSORBIDE MONONITRATE 60 MG: 60 TABLET, EXTENDED RELEASE ORAL at 08:23

## 2024-07-10 RX ADMIN — SEVELAMER HYDROCHLORIDE 800 MG: 800 TABLET ORAL at 18:05

## 2024-07-10 RX ADMIN — ATORVASTATIN CALCIUM 80 MG: 40 TABLET, FILM COATED ORAL at 08:24

## 2024-07-10 RX ADMIN — OXYCODONE HYDROCHLORIDE 5 MG: 5 TABLET ORAL at 06:04

## 2024-07-10 RX ADMIN — HEPARIN SODIUM 30 UNITS/KG/HR: 10000 INJECTION, SOLUTION INTRAVENOUS at 20:39

## 2024-07-10 RX ADMIN — FERROUS SULFATE TAB 325 MG (65 MG ELEMENTAL FE) 325 MG: 325 (65 FE) TAB at 08:23

## 2024-07-10 RX ADMIN — HEPARIN SODIUM 28 UNITS/KG/HR: 10000 INJECTION, SOLUTION INTRAVENOUS at 05:20

## 2024-07-10 RX ADMIN — AMLODIPINE BESYLATE 10 MG: 10 TABLET ORAL at 08:24

## 2024-07-10 RX ADMIN — OXYCODONE HYDROCHLORIDE 10 MG: 10 TABLET ORAL at 00:49

## 2024-07-10 RX ADMIN — TORSEMIDE 100 MG: 100 TABLET ORAL at 08:24

## 2024-07-10 RX ADMIN — CARVEDILOL 6.25 MG: 6.25 TABLET, FILM COATED ORAL at 08:24

## 2024-07-10 NOTE — PLAN OF CARE
Problem: Potential for Falls  Goal: Patient will remain free of falls  Description: INTERVENTIONS:  - Educate patient/family on patient safety including physical limitations  - Instruct patient to call for assistance with activity   - Consult OT/PT to assist with strengthening/mobility   - Keep Call bell within reach  - Keep bed low and locked with side rails adjusted as appropriate  - Keep care items and personal belongings within reach  - Initiate and maintain comfort rounds  - Make Fall Risk Sign visible to staff  - Offer Toileting every  Hours, in advance of need  - Initiate/Maintain alarm  - Obtain necessary fall risk management equipment:   - Apply yellow socks and bracelet for high fall risk patients  - Consider moving patient to room near nurses station  Outcome: Progressing     Problem: METABOLIC, FLUID AND ELECTROLYTES - ADULT  Goal: Electrolytes maintained within normal limits  Description: INTERVENTIONS:  - Monitor labs and assess patient for signs and symptoms of electrolyte imbalances  - Administer electrolyte replacement as ordered  - Monitor response to electrolyte replacements, including repeat lab results as appropriate  - Instruct patient on fluid and nutrition as appropriate  Outcome: Progressing  Goal: Fluid balance maintained  Description: INTERVENTIONS:  - Monitor labs   - Monitor I/O and WT  - Instruct patient on fluid and nutrition as appropriate  - Assess for signs & symptoms of volume excess or deficit  Outcome: Progressing     Problem: METABOLIC, FLUID AND ELECTROLYTES - ADULT  Goal: Fluid balance maintained  Description: INTERVENTIONS:  - Monitor labs   - Monitor I/O and WT  - Instruct patient on fluid and nutrition as appropriate  - Assess for signs & symptoms of volume excess or deficit  Outcome: Progressing     Problem: Prexisting or High Potential for Compromised Skin Integrity  Goal: Skin integrity is maintained or improved  Description: INTERVENTIONS:  - Identify patients at risk  for skin breakdown  - Assess and monitor skin integrity  - Assess and monitor nutrition and hydration status  - Monitor labs   - Assess for incontinence   - Turn and reposition patient  - Assist with mobility/ambulation  - Relieve pressure over bony prominences  - Avoid friction and shearing  - Provide appropriate hygiene as needed including keeping skin clean and dry  - Evaluate need for skin moisturizer/barrier cream  - Collaborate with interdisciplinary team   - Patient/family teaching  - Consider wound care consult   Outcome: Progressing     Problem: Nutrition/Hydration-ADULT  Goal: Nutrient/Hydration intake appropriate for improving, restoring or maintaining nutritional needs  Description: Monitor and assess patient's nutrition/hydration status for malnutrition. Collaborate with interdisciplinary team and initiate plan and interventions as ordered.  Monitor patient's weight and dietary intake as ordered or per policy. Utilize nutrition screening tool and intervene as necessary. Determine patient's food preferences and provide high-protein, high-caloric foods as appropriate.     INTERVENTIONS:  - Monitor oral intake, urinary output, labs, and treatment plans  - Assess nutrition and hydration status and recommend course of action  - Evaluate amount of meals eaten  - Assist patient with eating if necessary   - Allow adequate time for meals  - Recommend/ encourage appropriate diets, oral nutritional supplements, and vitamin/mineral supplements  - Order, calculate, and assess calorie counts as needed  - Recommend, monitor, and adjust tube feedings and TPN/PPN based on assessed needs  - Assess need for intravenous fluids  - Provide specific nutrition/hydration education as appropriate  - Include patient/family/caregiver in decisions related to nutrition  Outcome: Progressing

## 2024-07-10 NOTE — ASSESSMENT & PLAN NOTE
Lab Results   Component Value Date/Time    SODIUM 132 (L) 07/13/2024 07:45 AM     Likely due to fluid overload.     Plan:  Per nephro, fluid restriction to 1.5 L/day  Continue with hemodialysis  F/u  BMP 7/15

## 2024-07-10 NOTE — PLAN OF CARE
Post-Dialysis RN Treatment Note    Blood Pressure:  Pre 131/71 mm/Hg  Post 147/74 mmHg   EDW  181 kg    Weight:  Pre 176.5 kg   Post 173 kg   Mode of weight measurement: Standing Scale   Volume Removed  3,500 ml    Treatment duration 180 minutes    NS given  No    Treatment shortened? No   Medications given during Rx None, Venofer will be given by the primary RN d/t delivery issue.   Estimated Kt/V  Not Applicable   Access type: Permacath/TDC   Access Issues: No    Report called to primary nurse   Yes, by Chely DOBBINS RN to Maame CARDOSO RN      Started patient on hemodialysis treatment with UF goal of 3.5L net as toleratd per Dr. Viramontes x 3 hours x 3K bath for serum k+ of 4.3 today 7/10/24.    Problem: METABOLIC, FLUID AND ELECTROLYTES - ADULT  Goal: Electrolytes maintained within normal limits  Description: INTERVENTIONS:  - Monitor labs and assess patient for signs and symptoms of electrolyte imbalances  - Administer electrolyte replacement as ordered  - Monitor response to electrolyte replacements, including repeat lab results as appropriate  - Instruct patient on fluid and nutrition as appropriate  Outcome: Progressing  Goal: Fluid balance maintained  Description: INTERVENTIONS:  - Monitor labs   - Monitor I/O and WT  - Instruct patient on fluid and nutrition as appropriate  - Assess for signs & symptoms of volume excess or deficit  Outcome: Progressing

## 2024-07-10 NOTE — UTILIZATION REVIEW
Continued Stay Review    Date: 7/10/24                          Current Patient Class: IP  Current Level of Care: Med Surg    HPI:44 y.o. male initially admitted on 7/4    Assessment/Plan: Per cardiology recs: Continue amlodipine 5 mg daily,Coreg 6.25 mg BID, Imdur 60 mg daily, atorvastatin 80 mg daily. Holding aspirin 81 mg daily. Continue Heparin drip. To be held morning of renal biopsy (07/12). Transition to oral anticoagulation after renal biopsy. NPO after midnight on Thursday. Continue torsemide. Start IV Venofer 100 mg with each dialysis treatment for total 10 doses. 1500 ml fluid restriction. Continue HD.     Vital Signs (last 3 days)       Date/Time Temp Pulse Resp BP MAP (mmHg) SpO2 O2 Device Patient Position - Orthostatic VS Oksana Coma Scale Score Pain    07/10/24 0900 -- -- -- -- -- -- -- -- 15 8    07/10/24 0827 -- -- -- -- -- -- -- -- -- 8    07/10/24 0604 -- -- -- -- -- -- -- -- -- 8    07/10/24 0049 -- -- -- -- -- -- -- -- -- 8 07/09/24 2009 -- -- -- -- -- -- -- -- -- 8 07/09/24 1900 -- -- -- -- -- -- -- -- 15 --    07/09/24 1500 98.2 °F (36.8 °C) 70 21 138/67 91 -- None (Room air) Sitting -- --    07/09/24 1425 -- -- -- -- -- -- -- -- -- 7 07/09/24 1028 -- -- -- -- -- -- -- -- -- 8 07/09/24 0804 -- -- -- -- -- -- -- -- -- 8 07/09/24 0800 -- 55 -- 149/68 -- -- -- -- 15 --    07/09/24 0732 98.3 °F (36.8 °C) 58 -- 171/83 112 94 % None (Room air) Sitting -- --    07/09/24 0226 -- -- -- -- -- -- -- -- -- 10 - Worst Possible Pain    07/09/24 0015 -- -- -- -- -- -- None (Room air) -- 15 8 07/08/24 2349 -- -- -- -- -- -- -- -- -- 8    07/08/24 2100 98.8 °F (37.1 °C) -- -- 168/91 -- -- -- Sitting -- --    07/08/24 2015 -- -- -- -- -- -- -- -- -- 10 - Worst Possible Pain    07/08/24 1800 98.3 °F (36.8 °C) 50 16 178/80 113 95 % -- Lying -- No Pain    07/08/24 1755 -- -- -- -- -- -- -- -- -- 4    07/08/24 1730 -- 49 15 158/79 105 98 % -- -- -- --    07/08/24 1710 97.5 °F (36.4 °C) 54 16  166/76 -- -- -- -- -- --    07/08/24 1700 -- 50 16 162/73 103 93 % -- -- -- --    07/08/24 1630 -- 60 15 167/78 108 94 % -- -- -- --    07/08/24 1614 97.4 °F (36.3 °C) 57 16 155/84 -- -- -- -- -- --    07/08/24 1600 -- 56 15 133/71 92 95 % -- -- -- --    07/08/24 1558 97.5 °F (36.4 °C) 59 16 110/71 -- -- -- -- -- --    07/08/24 1545 -- 56 -- 144/75 98 94 % -- -- -- --    07/08/24 1530 -- 58 16 138/74 95 94 % -- -- -- --    07/08/24 1500 -- 55 15 130/66 87 92 % -- -- -- --    07/08/24 1430 -- 58 16 130/66 87 93 % -- -- -- --    07/08/24 1400 -- 58 15 125/66 86 -- -- -- -- --    07/08/24 1350 97.2 °F (36.2 °C) 58 16 112/66 81 93 % -- Lying -- 8    07/08/24 1310 -- -- -- -- -- -- -- -- -- 9    07/08/24 0755 98 °F (36.7 °C) 56 -- 155/82 106 -- -- -- 15 7    07/08/24 0741 -- -- -- -- -- -- None (Room air) Lying -- --    07/08/24 0048 -- -- -- -- -- -- -- -- -- 7    07/07/24 2118 97.8 °F (36.6 °C) -- -- 146/69 -- -- -- Lying -- --    07/07/24 2023 -- -- -- -- -- -- -- -- -- 8    07/07/24 1900 -- -- -- -- -- -- -- -- 15 --    07/07/24 1746 -- -- -- -- -- -- -- -- -- 8    07/07/24 1545 97.4 °F (36.3 °C) -- 16 126/69 -- -- -- Sitting -- --    07/07/24 0910 -- -- -- -- -- -- -- -- -- 7 07/07/24 0900 -- -- -- -- -- -- -- -- 15 --    07/07/24 0721 98 °F (36.7 °C) -- 17 157/83 108 94 % -- Sitting -- --    07/07/24 0600 -- -- -- -- -- -- -- -- -- 7 07/07/24 0401 -- -- -- -- -- -- -- -- -- 7    07/07/24 0102 -- -- -- -- -- -- -- -- -- 6          Weight (last 2 days)       Date/Time Weight    07/10/24 0553 176 (389.11)    07/09/24 0600 175 (385.14)    07/08/24 1800 174 (383.38)    07/08/24 1350 177 (390.21)    07/08/24 0600 177 (390.4)    07/08/24 0500 177 (390.4)              Pertinent Labs/Diagnostic Results:   Radiology:  CTA ED chest PE Study   Final Interpretation by Gurjit Gibbons MD (07/04 2003)      Subsegmental right lower lobe pulmonary embolism.   No CT evidence of right heart strain.      Diffuse patchy  groundglass opacities consistent with infectious/inflammatory infiltrate.      Findings were discussed with Dr. Monique Rothman MD 8:00 p.m. on 7/4/2024            Workstation performed: [id number]         XR chest 1 view portable   Final Interpretation by Danyell Ford MD (07/04 1707)      Mild pulmonary venous congestion.            Workstation performed: [id number]         IR biopsy kidney random    (Results Pending)     Cardiology:  ECG 12 lead   Final Result by Jhon Pedraza DO (07/08 0741)   Normal sinus rhythm   Normal ECG   When compared with ECG of 05-JUL-2024 00:44, (unconfirmed)   No significant change was found   Confirmed by Jhon Pedraza ([id number]) on 7/8/2024 7:41:44 AM      ECG 12 lead   Final Result by Andrea Longoria MD (07/06 1125)   Sinus bradycardia with sinus arrhythmia   Cannot rule out Anterior infarct , age undetermined   Abnormal ECG   When compared with ECG of 04-JUL-2024 14:52, (unconfirmed)   Premature atrial complexes are no longer Present   Confirmed by Andrea Longoria ([id number]) on 7/6/2024 11:25:40 AM      ECG 12 lead   Final Result by Andrea Longoria MD (07/06 1107)   Sinus rhythm with Premature atrial complexes   Otherwise normal ECG   When compared with ECG of 21-JUN-2024 15:16,   Premature atrial complexes are now Present   Confirmed by Andrea Longoria ([id number]) on 7/6/2024 11:07:32 AM        GI:  No orders to display       Results from last 7 days   Lab Units 07/04/24 2013   SARS-COV-2  Negative     Results from last 7 days   Lab Units 07/10/24  0517 07/08/24  2021 07/08/24  0518 07/07/24  0515 07/06/24  0456   WBC Thousand/uL 6.53  --  6.73 7.09 6.83   HEMOGLOBIN g/dL 8.1* 8.8* 7.8* 7.9* 7.5*   HEMATOCRIT % 25.9* 27.1* 25.0* 25.2* 24.6*   PLATELETS Thousands/uL 233  --  267 240 214   TOTAL NEUT ABS Thousands/µL 3.98  --  4.77  --  4.57         Results from last 7 days   Lab Units 07/10/24  0517 07/09/24  0433 07/08/24  0518 07/07/24  0515 07/06/24  0456 07/05/24  0411  07/04/24  1538   SODIUM mmol/L 132* 134* 133* 133* 136   < > 135   POTASSIUM mmol/L 4.3 4.0 4.5 4.2 4.2   < > 4.5   CHLORIDE mmol/L 96 96 95* 97 96   < > 96   CO2 mmol/L 28 30 25 25 29   < > 27   ANION GAP mmol/L 8 8 13 11 11   < > 12   BUN mg/dL 41* 32* 51* 45* 33*   < > 27*   CREATININE mg/dL 9.56* 8.05* 11.13* 10.20* 8.43*   < > 9.02*   EGFR ml/min/1.73sq m 5 7 4 5 6   < > 6   CALCIUM mg/dL 9.1 8.6 8.9 8.8 8.8   < > 9.0   MAGNESIUM mg/dL  --   --   --   --   --   --  1.9   PHOSPHORUS mg/dL  --   --  5.8*  --  4.6*  --   --     < > = values in this interval not displayed.     Results from last 7 days   Lab Units 07/04/24  1538   AST U/L 15   ALT U/L <3*   ALK PHOS U/L 90   TOTAL PROTEIN g/dL 7.8   ALBUMIN g/dL 3.1*   TOTAL BILIRUBIN mg/dL 0.41     Results from last 7 days   Lab Units 07/10/24  1119 07/10/24  0730 07/09/24  2138 07/09/24  1520 07/09/24  1130 07/09/24  0730 07/08/24  2116 07/08/24  1541 07/08/24  1058 07/08/24  0742 07/07/24  2113 07/07/24  1542   POC GLUCOSE mg/dl 90 79 94 97 93 82 103 112 99 76 118 102     Results from last 7 days   Lab Units 07/10/24  0517 07/09/24  0433 07/08/24  0518 07/07/24  0515 07/06/24  0456 07/05/24  0411 07/04/24  1538   GLUCOSE RANDOM mg/dL 77 84 68 82 85 80 89           Results from last 7 days   Lab Units 07/05/24  0348 07/05/24  0025 07/04/24  1940 07/04/24  1727 07/04/24  1538   HS TNI 0HR ng/L  --  32  --   --  31   HS TNI 2HR ng/L 27  --   --  35  --    HSTNI D2 ng/L -5  --   --  4  --    HS TNI 4HR ng/L  --   --  36  --   --    HSTNI D4 ng/L  --   --  5  --   --      Results from last 7 days   Lab Units 07/04/24  1538   D-DIMER QUANTITATIVE ug/ml FEU 4.21*     Results from last 7 days   Lab Units 07/10/24  0926 07/10/24  0040 07/09/24  1822 07/05/24  0242 07/04/24 2013   PROTIME seconds  --   --   --   --  15.7*   INR   --   --   --   --  1.18   PTT seconds 50* 93* 86*   < > 32    < > = values in this interval not displayed.           Results from last 7 days    Lab Units 07/04/24  1538   BNP pg/mL 990*             Results from last 7 days   Lab Units 07/09/24  0547 07/05/24  0547   UNIT PRODUCT CODE  N2741T55 [id number]  [id number]   UNIT NUMBER  [id number]  [id number]   UNITABO  A A  A   UNITRH  POS POS  POS   CROSSMATCH  Compatible Compatible  Compatible   UNIT DISPENSE STATUS  Presumed Trans Presumed Trans  Presumed Trans   UNIT PRODUCT VOL ml 350 350  350     Results from last 7 days   Lab Units 07/05/24  1633   HEP B S AG  Non-reactive   HEP C AB  Non-reactive   HEP B C IGM  Non-reactive   HEP B C TOTAL AB  Non-reactive           Results from last 7 days   Lab Units 07/04/24 2013   INFLUENZA A PCR  Negative   INFLUENZA B PCR  Negative   RSV PCR  Negative         Medications:   Scheduled Medications:  amLODIPine, 10 mg, Oral, Daily  atorvastatin, 80 mg, Oral, Daily  carvedilol, 6.25 mg, Oral, BID With Meals  ferrous sulfate, 325 mg, Oral, BID With Meals  isosorbide mononitrate, 60 mg, Oral, Daily  lidocaine, 1 patch, Topical, Daily  sevelamer, 800 mg, Oral, TID With Meals  torsemide, 100 mg, Oral, Daily      Continuous IV Infusions:  heparin (porcine), 3-30 Units/kg/hr (Order-Specific), Intravenous, Titrated      PRN Meds:  acetaminophen, 650 mg, Oral, Q6H PRN  heparin (porcine), 10,000 Units, Intravenous, Q6H PRN  heparin (porcine), 5,000 Units, Intravenous, Q6H PRN  iron sucrose, 100 mg, Intravenous, After Dialysis  labetalol, 10 mg, Intravenous, Q6H PRN  oxyCODONE, 10 mg, Oral, Q4H PRN  oxyCODONE, 5 mg, Oral, Q4H PRN        Discharge Plan: D    Network Utilization Review Department  ATTENTION: Please call with any questions or concerns to [phone] and carefully listen to the prompts so that you are directed to the right person. All voicemails are confidential.   For Discharge needs, contact Care Management DC Support Team at [phone] opt. [phone]  Send all requests for admission clinical reviews, approved or denied determinations  and any other requests to dedicated fax number below belonging to the campus where the patient is receiving treatment. List of dedicated fax numbers for the Facilities:  FACILITY NAME UR FAX NUMBER   ADMISSION DENIALS (Administrative/Medical Necessity) [phone]   DISCHARGE SUPPORT TEAM (NETWORK) [phone]   PARENT CHILD HEALTH (Maternity/NICU/Pediatrics) [phone]   [facility] [phone]   [facility] [phone]   [facility] [phone]   [facility] [phone]   [facility] [phone]   [facility] [phone]   [facility] [phone]   [facility] [phone]   [facility] [phone]   [facility] [phone]   [facility] [phone]   [facility] [phone]

## 2024-07-10 NOTE — PROGRESS NOTES
[facility]  Progress Note  Name: Joaquin Frankel I  MRN: [id number]  Unit/Bed#: S -01 I Date of Admission: 7/4/2024   Date of Service: 7/10/2024 I Hospital Day: 6    Assessment & Plan   * Chest pain  Assessment & Plan  Patient presented with worsening substernal chest pain over several weeks.  H/o chronic angina, CAD, family history of hereditary heart disease on fathers side.   Patient was admitted for similar chest pain in early June and was found to have ESRD, started on HD MWF.  Recent nuclear stress test without significant reversible ischemia  EKG on admission: Sinus Rhythm with PAC's. Repeat EKG showed sinus bradycardia  High-sensitivity troponin negative x 5   . D-Dimer 4.21  CTA: subsegmental right lower lobe pulmonary embolism  07/05/24: Updated echocardiogram on unremarkable  Patient blood pressure elevated on admission to 194/80, currently stable.  Patient received 2 doses of nitroglycerin in ED and reports it did not improve his pain, but it improved his elevated blood pressure.  Patient was saturating low 80s on room air on admission and required 3L supplemental O2, which improved saturation to 90s. O2 Sat currently 94% to 95% on room air.  MRSA negative. Viral panel negative.    Multifactorial from acute on chronic anemia, hypertensive urgency, and acute PE   Patient's chest pain has improved following hemodialysis and the improvement of high blood pressure.   7/9/24: Telemetry discontinued    Plan-  Per cardiology recs: Continue amlodipine 5 mg daily,Coreg 6.25 mg BID, Imdur 60 mg daily, atorvastatin 80 mg daily. Holding aspirin 81 mg daily    Pulmonary embolism (HCC)  Assessment & Plan  Patient presented for worsening chest pain, shortness of breath, and dyspnea on exertion.  Patient was admitted for similar chest pain in early June and was found to have ESRD, started on HD MWF   D-Dimer 4.21  CTA showed subsegmental right lower lobe pulmonary  embolism  Placed on heparin drip  Patient no longer on nasal cannula    Plan-  Continue Heparin drip. To be held morning of renal biopsy (07/12/2024)  Transition to oral anticoagulation after renal biopsy   Supplemental oxygen as needed    ESRD (end stage renal disease) on dialysis (HCC)  Assessment & Plan  Lab Results   Component Value Date    EGFR 5 07/10/2024    EGFR 7 07/09/2024    EGFR 4 07/08/2024    CREATININE 9.56 (H) 07/10/2024    CREATININE 8.05 (H) 07/09/2024    CREATININE 11.13 (H) 07/08/2024     Patient ESRD on Dialysis 3 times a week, MWF  Elevated , c/o of orthopnea   Etiology of CLOVIS: Etiology not entirely clear - Possible MGRS? Pattern of disease progression is not typical for diabetic nephropathy.   Etiology of CKD: Suspected due to diabetic nephropathy, hypertensive nephrosclerosis, obesity related renal dysfunction  Previous baseline creatinine 1.3-1.6 w/ prior episodes of CLOVIS  Serologic workup negative except for IgG kappa on SPEP and UPEP.  Cryoglobulin negative, ANCA negative, anti-dsDNA negative, anti-GBM negative, JOSE ROBERTO negative, C4 34, C3 118  Bone marrow biopsy 6/12/2024: negative for plasma cell neoplasm.   CRRT initiated 6/7/2024.  Peak creatinine 10.4  Last treatment 7/8/2024 with UF 3.5 L  EDW: 181 kg  electrolytes and acid/base stable  BUN 41 Creatinine 9.56 today    Plan:  Nephrology onboard  Will have HD treatment today  IR guided renal biopsy rescheduled for Friday 07/12/2024. Hold heparin at 10 AM that morning. Will keep NPO after midnight on Thursday 7/11/2024  Hold oral AC. Resume ASA 81 mg daily after renal biopsy   Continue torsemide 100 mg daily    Hypertension  Assessment & Plan  History of HTN, recent medication change after dx of ESRD  Workup at that time notable for IgG kappa monoclonal gammopathy and s/p bone marrow biopsy on 6/12/2023 to assess for multiple myeloma. Bone marrow biopsy with no evidence of plasma cell neoplasm.   HD on MWF schedule.   Presented to ED  with uncontrolled /80. Endorsed headaches and blurry vision in his left eye in the ED that has since resolved.  BP improved    Plan:  Continue amlodipine 10 mg daily, carvedilol 6.25 mg twice daily, Imdur 60 mg once daily, torsemide 100 mg daily  Hold carvedilol if HR <50  Nephrology and cardiology onbooard   Continue UF with dialysis as BP tolerates     Type 2 diabetes mellitus (HCC)  Assessment & Plan  Lab Results   Component Value Date    HGBA1C 5.7 (H) 06/09/2024       Recent Labs     07/09/24  1520 07/09/24  2138 07/10/24  0730 07/10/24  1119   POCGLU 97 94 79 90       Blood Sugar Average: Last 72 hrs:  (P) [id number]      Anemia  Assessment & Plan  Patient has history of anemia of chronic disease, ESRD  CBC on admission showed hgb 6.6 / hct 21.2  Received 2 units packed RBC's in ED. Hbg 7.6 after transfusion  Hematology following with outpatient f/u scheduled 7/11/2024, patient will reschedule due to overlapping hospital admission   On oral iron twice daily  No active bleeding, on heparin for PE, will switch to oral AC after renal biopsy procedure.  7/8/2024 Patient was transfused 1 unit of blood during hemodialysis.  Hemoglobin 8.8, hematocrit 27.1  S/P 3 units packed RBCs  H&H today 8.1/25.9    Plan:  Start IV Venofer 100 mg with each dialysis treatment for total 10 doses starting 7/10/24  Continue to monitor hemoglobin levels  Transfuse for Hgb <8.0    Hyponatremia  Assessment & Plan  Sodium 132 today   Likely due to fluid overload.  Patient scheduled for hemodialysis today    Plan:  Per nephro, fluid restriction to 1.5 L/day  Continue with hemodialysis  Follow-up w/ BMP in the a.m.    ANGY (obstructive sleep apnea)  Assessment & Plan  Overnight pulse oximetry obtained: highest O2 sat 99%, his lowest O2 sat 73%, with a mean of 91%. Patient had 120 desaturation events <89%.  He spent an 1 hour and 15 minutes in desaturation (~15.82%) and and 1 hour and 34 minutes with desaturation <89%  (~19.77%).  Counseled patient on importance of maintaining oxygen saturation as oxygen deprivation can lead to hypertension, heart failure, coronary artery disease, pulmonary hypertension, etc.    Patient was advised of need to wear oxygen at night upon discharge. He declines stating he would like to wait for formal evaluation via sleep study as an outpatient.    Chronic acquired lymphedema  Assessment & Plan  Chronic LLE lymphedema x 3 years after surgical resection of LLE cyst    Plan:  Advised compression and elevation of lower extremity for symptomatic edema relief    Acute respiratory failure with hypoxia (HCC)-resolved as of 7/6/2024  Assessment & Plan  Patient O2 saturation on admission was 83% on RA  Was placed on 3/L Nasal canula FiO2 32%  CTA showed right lower lobe subsegmental PE  Was placed on heparin  Pt currently on RA    Plan:  Supplemental oxygen as needed  Continue heparin for PE  Incentive Spirometry           VTE Pharmacologic Prophylaxis: VTE Score: 7 High Risk (Score >/= 5) - Pharmacological DVT Prophylaxis Ordered: heparin drip. Sequential Compression Devices Ordered.    Mobility:   Basic Mobility Inpatient Raw Score: 24  JH-HLM Goal: 8: Walk 250 feet or more  JH-HLM Achieved: 8: Walk 250 feet ot more  JH-HLM Goal achieved. Continue to encourage appropriate mobility.    Patient Centered Rounds: I performed bedside rounds with nursing staff today.   Discussions with Specialists or Other Care Team Provider: cardiology, nephrology    Education and Discussions with Family / Patient: Patient declined call to .     Current Length of Stay: 6 day(s)  Current Patient Status: Inpatient   Discharge Plan: Anticipate discharge in 48-72 hrs to home.    Code Status: Level 1 - Full Code    Subjective:   Patient was seen and evaluated at bedside.  No overnight events.  He endorses improvement of chest pain as well as shortness of breath. He continues to increase his ambulation. He has no other  complaints at this time.  Patient was made aware that his renal biopsy was rescheduled to Friday. He will receive dialysis today.    Objective:     Vitals:   Temp (24hrs), Av.2 °F (36.8 °C), Min:98.2 °F (36.8 °C), Max:98.2 °F (36.8 °C)    Temp:  [98.2 °F (36.8 °C)] 98.2 °F (36.8 °C)  HR:  [70] 70  Resp:  [21] 21  BP: (138)/(67) 138/67  Body mass index is 60.94 kg/m².     Input and Output Summary (last 24 hours):     Intake/Output Summary (Last 24 hours) at 7/10/2024 1354  Last data filed at 7/10/2024 0919  Gross per 24 hour   Intake 1620 ml   Output 2000 ml   Net -380 ml       Physical Exam:   Physical Exam  Vitals and nursing note reviewed.   Constitutional:       General: He is not in acute distress.     Appearance: He is obese. He is not ill-appearing.   HENT:      Head: Normocephalic and atraumatic.      Right Ear: External ear normal.      Left Ear: External ear normal.      Nose: Nose normal.      Mouth/Throat:      Mouth: Mucous membranes are moist.      Pharynx: Oropharynx is clear.   Eyes:      Extraocular Movements: Extraocular movements intact.      Conjunctiva/sclera: Conjunctivae normal.   Cardiovascular:      Rate and Rhythm: Regular rhythm. Bradycardia present.      Pulses: Normal pulses.      Heart sounds: Normal heart sounds. No murmur heard.     No gallop.   Pulmonary:      Effort: Pulmonary effort is normal. No respiratory distress.      Breath sounds: Normal breath sounds. No wheezing.      Comments: Tolerating room air  Abdominal:      General: Bowel sounds are normal. There is no distension.      Palpations: Abdomen is soft.      Tenderness: There is no abdominal tenderness.   Musculoskeletal:         General: Normal range of motion.      Cervical back: Normal range of motion and neck supple. No rigidity.      Right lower leg: Edema (violaceous skin changes right lower extremity with healing punctate wounds over shin) present.      Left lower leg: Edema (lymphedema) present.      Comments:  LLE significantly larger than RLE due to chronic lymphedema. Healed surgical incision noted to left calf.    Skin:     General: Skin is warm and dry.      Comments: R IJV PermCath site clear. Dressing intact.  Bandage in place over right anterior ankle abrasion, not removed.   Neurological:      General: No focal deficit present.      Mental Status: He is alert and oriented to person, place, and time. Mental status is at baseline.   Psychiatric:         Mood and Affect: Mood normal.          Additional Data:     Labs:  Results from last 7 days   Lab Units 07/10/24  0517   WBC Thousand/uL 6.53   HEMOGLOBIN g/dL 8.1*   HEMATOCRIT % 25.9*   PLATELETS Thousands/uL 233   SEGS PCT % 60   LYMPHO PCT % 18   MONO PCT % 12   EOS PCT % 8*     Results from last 7 days   Lab Units 07/10/24  0517 07/05/24  0411 07/04/24  1538   SODIUM mmol/L 132*   < > 135   POTASSIUM mmol/L 4.3   < > 4.5   CHLORIDE mmol/L 96   < > 96   CO2 mmol/L 28   < > 27   BUN mg/dL 41*   < > 27*   CREATININE mg/dL 9.56*   < > 9.02*   ANION GAP mmol/L 8   < > 12   CALCIUM mg/dL 9.1   < > 9.0   ALBUMIN g/dL  --   --  3.1*   TOTAL BILIRUBIN mg/dL  --   --  0.41   ALK PHOS U/L  --   --  90   ALT U/L  --   --  <3*   AST U/L  --   --  15   GLUCOSE RANDOM mg/dL 77   < > 89    < > = values in this interval not displayed.     Results from last 7 days   Lab Units 07/04/24  2013   INR  1.18     Results from last 7 days   Lab Units 07/10/24  1119 07/10/24  0730 07/09/24  2138 07/09/24  1520 07/09/24  1130 07/09/24  0730 07/08/24  2116 07/08/24  1541 07/08/24  1058 07/08/24  0742 07/07/24  2113 07/07/24  1542   POC GLUCOSE mg/dl 90 79 94 97 93 82 103 112 99 76 118 102               Lines/Drains:  Invasive Devices       Peripheral Intravenous Line  Duration             Peripheral IV 07/09/24 Right;Lower Arm 1 day              Hemodialysis Catheter  Duration             HD Permanent Double Catheter 25 days                    Imaging: Reviewed radiology reports from this  admission including: chest xray, abdominal/pelvic CT, and ECHO    Recent Cultures (last 7 days):         Last 24 Hours Medication List:   Current Facility-Administered Medications   Medication Dose Route Frequency Provider Last Rate    acetaminophen  650 mg Oral Q6H PRN Mily Martin MD      amLODIPine  10 mg Oral Daily Andrea Longoria MD      atorvastatin  80 mg Oral Daily Mily Martin MD      carvedilol  6.25 mg Oral BID With Meals Andrea Longoria MD      ferrous sulfate  325 mg Oral BID With Meals Mily Martin MD      heparin (porcine)  3-30 Units/kg/hr (Order-Specific) Intravenous Titrated Gustavo Neville MD 30 Units/kg/hr (07/10/24 1341)    heparin (porcine)  10,000 Units Intravenous Q6H PRN Monique Rothman DO      heparin (porcine)  5,000 Units Intravenous Q6H PRN Monique Rothman DO      iron sucrose  100 mg Intravenous After Dialysis Johnny Viramontes MD      isosorbide mononitrate  60 mg Oral Daily Mily Martin MD      labetalol  10 mg Intravenous Q6H PRN Liana Riley MD      lidocaine  1 patch Topical Daily Mily Martin MD      oxyCODONE  10 mg Oral Q4H PRN Lorri Maria MD      oxyCODONE  5 mg Oral Q4H PRN Lorri Maria MD      sevelamer  800 mg Oral TID With Meals Mily Martin MD      torsemide  100 mg Oral Daily Charles Spear MD          Today, Patient Was Seen By: Deya Palomino MD    **Please Note: This note may have been constructed using a voice recognition system.**

## 2024-07-10 NOTE — PROGRESS NOTES
NEPHROLOGY PROGRESS NOTE   Joaquin Frankel 44 y.o. male MRN: [id number]  Unit/Bed#: S -01 Encounter: [id number]    ASSESSMENT & PLAN:  44-year-old male presented with complaint of chest pain.  Nephrology was consulted for acute kidney injury on HD.  Recent hospital admission at [facility] between Tori 3 Tori 26 during that hospital admission he had a admission creatinine of 6.4 and was started on intermittent HD on 6/7/24 and has been on dialysis dependent since then.  He now presented with complaint of chest pain and CTA on admission revealed PE.    Acute kidney injury on HD, POA  -History of acute kidney injury previous hospital admission in June 2024 and on HD since 6/7/2024.  Current outpatient dialysis clinic [facility] Monday Wednesday Friday.  -Access right IJ PermCath  -Serum creatinine was 1.22 mg/dL in February 2024, increased to 2.98 in April 2024 and then increased to 6.4 in June 2024.  Serological workup was negative except for IgG kappa on SPEP and UPEP.  Cryoglobulin negative, ANCA negative anti-double-stranded DNA negative anti-GBM negative JOSE ROBERTO negative, C3-C4 at normal range.  Bone marrow biopsy done on June 12, 2024 did not reveal a plasma cell neoplasm.  -Etiology: Exact etiology not clear.  Patient does admit to using NSAIDs which could be contributing to CLOVIS.  Also there is possibility of MGRS -plan for kidney biopsy  Plan:  Plan for kidney biopsy this week on Friday  as per my discussion with IR  Continue to hold aspirin.  Recommend holding heparin at 10 AM on Friday and to keep n.p.o. after midnight on Thursday.   Last hemodialysis treatment was on 7/8, continue UF with HD.  Plan for hemodialysis treatment today with ultrafiltration.  Will challenge target weight.  Current estimated dry weight 181 kg    Chronic kidney disease stage III: Baseline creatinine 1.2 to 1.5 mg/dL.  No prior renal follow-up.  History of nephrotic range proteinuria in April 2024    Primary  hypertension: Blood pressure acceptable.  Continue current treatment.  Avoid hypotension.  Currently on amlodipine, Coreg, torsemide    Hyponatremia likely due to fluid overload, sodium trended down to 132 continue to monitor, recommend fluid restriction 1.5 L/day and continue UF with HD    Fluid overload: Continue UF with HD   -continue to challenge target weight.  Patient has left lower extremity lymphedema.  Right lower extremity edema has resolved    Anemia due to iron deficiency as well as chronic kidney disease and chronic illness  -no JOSSELYN due to acute PE.  Iron saturation 17%  -Status post PRBC, hemoglobin currently stable at 8.1 g/dL, continue to monitor.  Order for IV iron 100 mg with each dialysis treatment for total 10 doses starting 7/10    Nephrotic range proteinuria  -Albumin creatinine ratio was 5.2 g in April 2024, plan for kidney biopsy as mentioned above    IgG kappa monoclonal gammopathy, bone marrow biopsy on June 12, 2024 did not reveal plasma cell neoplasm.  Plan for kidney biopsy as mentioned above    CKD/MBD/hyperphosphatemia   -continue sevelamer.  Phosphorus is elevated at 5.8, recommend low phosphorus diet..  If phosphorus remains elevated may need to increase the dose of binders    Pulmonary embolism: On heparin drip.  Hold off on NOAC due to need for kidney biopsy    Left lower extremity lymphedema recommend limb elevation   continue UF with HD    Complaint of chest pain, history of CAD, cardiology on board,  repeat nuclear stress test without significant reversible ischemia.  As per cardiology note most likely due to acute PE chronic anemia and hypertension causing chest pain     Discussed with primary team regarding plan for intermittent HD treatment today and agreed with the plan.  Also discussed about starting on IV Venofer and agreed      SUBJECTIVE:  No new complaints, no nausea vomiting, plan for hemodialysis treatment today    OBJECTIVE:  Current Weight: Weight - Scale: (!) 176  kg (389 lb 1.8 oz)  Vitals:    07/09/24 1500   BP: 138/67   Pulse: 70   Resp: 21   Temp: 98.2 °F (36.8 °C)   SpO2:        Intake/Output Summary (Last 24 hours) at 7/10/2024 1131  Last data filed at 7/10/2024 0919  Gross per 24 hour   Intake 1920 ml   Output 2000 ml   Net -80 ml       Physical Exam  General:  Ill looking, awake.  Obese  Eyes: Conjunctivae pink,  Sclera anicteric  ENT: lips and mucous membranes moist  Neck: supple   Chest: Clear to Auscultation both lungs,  no crackles, ronchus or wheezing.  CVS: S1 & S2 present, normal rate, regular rhythm, no murmur.  Abdomen: soft, non-tender, non-distended, Bowel sounds normoactive  Extremities: Left lower extremity lymphedema  Skin: no rash  Neuro: awake, alert, oriented x 3   Psych: Mood and affect appropriate      Medications:    Current Facility-Administered Medications:     acetaminophen (TYLENOL) tablet 650 mg, 650 mg, Oral, Q6H PRN, Mily Martin MD, 650 mg at 07/05/24 1251    amLODIPine (NORVASC) tablet 10 mg, 10 mg, Oral, Daily, Andrea Longoria MD, 10 mg at 07/10/24 0824    atorvastatin (LIPITOR) tablet 80 mg, 80 mg, Oral, Daily, Mily Martin MD, 80 mg at 07/10/24 0824    carvedilol (COREG) tablet 6.25 mg, 6.25 mg, Oral, BID With Meals, Andrea Longoria MD, 6.25 mg at 07/10/24 0824    ferrous sulfate tablet 325 mg, 325 mg, Oral, BID With Meals, Mily Martin MD, 325 mg at 07/10/24 0823    heparin (porcine) 25,000 units in 0.45% NaCl 250 mL infusion (premix), 3-30 Units/kg/hr (Order-Specific), Intravenous, Titrated, Gustavo Neville MD, Last Rate: 37.5 mL/hr at 07/10/24 1113, 30 Units/kg/hr at 07/10/24 1113    heparin (porcine) injection 10,000 Units, 10,000 Units, Intravenous, Q6H PRN, Monique Rothman DO    heparin (porcine) injection 5,000 Units, 5,000 Units, Intravenous, Q6H PRN, Monique Rothman DO, 5,000 Units at 07/10/24 1114    isosorbide mononitrate (IMDUR) 24 hr tablet 60 mg, 60 mg, Oral, Daily, Mily Martin MD,  "60 mg at 07/10/24 0823    labetalol (NORMODYNE) injection 10 mg, 10 mg, Intravenous, Q6H PRN, Liana Riley MD    lidocaine (LIDODERM) 5 % patch 1 patch, 1 patch, Topical, Daily, Mily Martin MD    oxyCODONE (ROXICODONE) immediate release tablet 10 mg, 10 mg, Oral, Q4H PRN, Lorri Maria MD, 10 mg at 07/10/24 0827    oxyCODONE (ROXICODONE) IR tablet 5 mg, 5 mg, Oral, Q4H PRN, Lorri Maria MD, 5 mg at 07/10/24 0604    sevelamer (RENAGEL) tablet 800 mg, 800 mg, Oral, TID With Meals, Mily Martin MD, 800 mg at 07/10/24 1114    torsemide (DEMADEX) tablet 100 mg, 100 mg, Oral, Daily, Charles Spear MD, 100 mg at 07/10/24 0824    Invasive Devices:        Lab Results:   Results from last 7 days   Lab Units 07/10/24  0517 07/09/24  0433 07/08/24  2021 07/08/24  0518 07/07/24  0515 07/06/24  0456 07/05/24  0411 07/04/24  1538   WBC Thousand/uL 6.53  --   --  6.73 7.09 6.83   < > 6.92   HEMOGLOBIN g/dL 8.1*  --  8.8* 7.8* 7.9* 7.5*   < > 6.6*   HEMATOCRIT % 25.9*  --  27.1* 25.0* 25.2* 24.6*   < > 21.2*   PLATELETS Thousands/uL 233  --   --  267 240 214   < > 254   POTASSIUM mmol/L 4.3 4.0  --  4.5 4.2 4.2   < > 4.5   CHLORIDE mmol/L 96 96  --  95* 97 96   < > 96   CO2 mmol/L 28 30  --  25 25 29   < > 27   BUN mg/dL 41* 32*  --  51* 45* 33*   < > 27*   CREATININE mg/dL 9.56* 8.05*  --  11.13* 10.20* 8.43*   < > 9.02*   CALCIUM mg/dL 9.1 8.6  --  8.9 8.8 8.8   < > 9.0   MAGNESIUM mg/dL  --   --   --   --   --   --   --  1.9   PHOSPHORUS mg/dL  --   --   --  5.8*  --  4.6*  --   --    ALK PHOS U/L  --   --   --   --   --   --   --  90   ALT U/L  --   --   --   --   --   --   --  <3*   AST U/L  --   --   --   --   --   --   --  15    < > = values in this interval not displayed.       Previous work up:         Portions of the record may have been created with voice recognition software. Occasional wrong word or \"sound a like\" substitutions may have occurred due to the inherent limitations of voice " recognition software. Read the chart carefully and recognize, using context, where substitutions have occurred.If you have any questions, please contact the dictating provider.

## 2024-07-10 NOTE — PLAN OF CARE
Problem: Potential for Falls  Goal: Patient will remain free of falls  Description: INTERVENTIONS:  - Educate patient/family on patient safety including physical limitations  - Instruct patient to call for assistance with activity   - Consult OT/PT to assist with strengthening/mobility   - Keep Call bell within reach  - Keep bed low and locked with side rails adjusted as appropriate  - Keep care items and personal belongings within reach  - Initiate and maintain comfort rounds  - Make Fall Risk Sign visible to staff  - Offer Toileting every 2 Hours, in advance of need  - Initiate/Maintain alarm  - Obtain necessary fall risk management equipment:  - Apply yellow socks and bracelet for high fall risk patients  - Consider moving patient to room near nurses station  Outcome: Progressing     Problem: METABOLIC, FLUID AND ELECTROLYTES - ADULT  Goal: Electrolytes maintained within normal limits  Description: INTERVENTIONS:  - Monitor labs and assess patient for signs and symptoms of electrolyte imbalances  - Administer electrolyte replacement as ordered  - Monitor response to electrolyte replacements, including repeat lab results as appropriate  - Instruct patient on fluid and nutrition as appropriate  Outcome: Progressing  Goal: Fluid balance maintained  Description: INTERVENTIONS:  - Monitor labs   - Monitor I/O and WT  - Instruct patient on fluid and nutrition as appropriate  - Assess for signs & symptoms of volume excess or deficit  Outcome: Progressing     Problem: Prexisting or High Potential for Compromised Skin Integrity  Goal: Skin integrity is maintained or improved  Description: INTERVENTIONS:  - Identify patients at risk for skin breakdown  - Assess and monitor skin integrity  - Assess and monitor nutrition and hydration status  - Monitor labs   - Assess for incontinence   - Turn and reposition patient  - Assist with mobility/ambulation  - Relieve pressure over bony prominences  - Avoid friction and  shearing  - Provide appropriate hygiene as needed including keeping skin clean and dry  - Evaluate need for skin moisturizer/barrier cream  - Collaborate with interdisciplinary team   - Patient/family teaching  - Consider wound care consult   Outcome: Progressing     Problem: Nutrition/Hydration-ADULT  Goal: Nutrient/Hydration intake appropriate for improving, restoring or maintaining nutritional needs  Description: Monitor and assess patient's nutrition/hydration status for malnutrition. Collaborate with interdisciplinary team and initiate plan and interventions as ordered.  Monitor patient's weight and dietary intake as ordered or per policy. Utilize nutrition screening tool and intervene as necessary. Determine patient's food preferences and provide high-protein, high-caloric foods as appropriate.     INTERVENTIONS:  - Monitor oral intake, urinary output, labs, and treatment plans  - Assess nutrition and hydration status and recommend course of action  - Evaluate amount of meals eaten  - Assist patient with eating if necessary   - Allow adequate time for meals  - Recommend/ encourage appropriate diets, oral nutritional supplements, and vitamin/mineral supplements  - Order, calculate, and assess calorie counts as needed  - Recommend, monitor, and adjust tube feedings and TPN/PPN based on assessed needs  - Assess need for intravenous fluids  - Provide specific nutrition/hydration education as appropriate  - Include patient/family/caregiver in decisions related to nutrition  Outcome: Progressing

## 2024-07-11 LAB
ANION GAP SERPL CALCULATED.3IONS-SCNC: 9 MMOL/L (ref 4–13)
APTT PPP: 117 SECONDS (ref 23–37)
APTT PPP: 64 SECONDS (ref 23–37)
APTT PPP: 74 SECONDS (ref 23–37)
BASOPHILS # BLD AUTO: 0.04 THOUSANDS/ÂΜL (ref 0–0.1)
BASOPHILS NFR BLD AUTO: 1 % (ref 0–1)
BUN SERPL-MCNC: 32 MG/DL (ref 5–25)
CALCIUM SERPL-MCNC: 9.2 MG/DL (ref 8.4–10.2)
CHLORIDE SERPL-SCNC: 94 MMOL/L (ref 96–108)
CO2 SERPL-SCNC: 28 MMOL/L (ref 21–32)
CREAT SERPL-MCNC: 8.13 MG/DL (ref 0.6–1.3)
EOSINOPHIL # BLD AUTO: 0.55 THOUSAND/ÂΜL (ref 0–0.61)
EOSINOPHIL NFR BLD AUTO: 9 % (ref 0–6)
ERYTHROCYTE [DISTWIDTH] IN BLOOD BY AUTOMATED COUNT: 14.2 % (ref 11.6–15.1)
GFR SERPL CREATININE-BSD FRML MDRD: 7 ML/MIN/1.73SQ M
GLUCOSE SERPL-MCNC: 112 MG/DL (ref 65–140)
GLUCOSE SERPL-MCNC: 113 MG/DL (ref 65–140)
GLUCOSE SERPL-MCNC: 113 MG/DL (ref 65–140)
GLUCOSE SERPL-MCNC: 86 MG/DL (ref 65–140)
HCT VFR BLD AUTO: 28.9 % (ref 36.5–49.3)
HGB BLD-MCNC: 9.1 G/DL (ref 12–17)
IMM GRANULOCYTES # BLD AUTO: 0.05 THOUSAND/UL (ref 0–0.2)
IMM GRANULOCYTES NFR BLD AUTO: 1 % (ref 0–2)
LYMPHOCYTES # BLD AUTO: 1.1 THOUSANDS/ÂΜL (ref 0.6–4.47)
LYMPHOCYTES NFR BLD AUTO: 17 % (ref 14–44)
MCH RBC QN AUTO: 29 PG (ref 26.8–34.3)
MCHC RBC AUTO-ENTMCNC: 31.5 G/DL (ref 31.4–37.4)
MCV RBC AUTO: 92 FL (ref 82–98)
MONOCYTES # BLD AUTO: 0.66 THOUSAND/ÂΜL (ref 0.17–1.22)
MONOCYTES NFR BLD AUTO: 10 % (ref 4–12)
NEUTROPHILS # BLD AUTO: 4.08 THOUSANDS/ÂΜL (ref 1.85–7.62)
NEUTS SEG NFR BLD AUTO: 62 % (ref 43–75)
NRBC BLD AUTO-RTO: 0 /100 WBCS
PLATELET # BLD AUTO: 241 THOUSANDS/UL (ref 149–390)
PMV BLD AUTO: 9 FL (ref 8.9–12.7)
POTASSIUM SERPL-SCNC: 4 MMOL/L (ref 3.5–5.3)
RBC # BLD AUTO: 3.14 MILLION/UL (ref 3.88–5.62)
SODIUM SERPL-SCNC: 131 MMOL/L (ref 135–147)
WBC # BLD AUTO: 6.48 THOUSAND/UL (ref 4.31–10.16)

## 2024-07-11 PROCEDURE — 82948 REAGENT STRIP/BLOOD GLUCOSE: CPT

## 2024-07-11 PROCEDURE — 99233 SBSQ HOSP IP/OBS HIGH 50: CPT | Performed by: INTERNAL MEDICINE

## 2024-07-11 PROCEDURE — 99232 SBSQ HOSP IP/OBS MODERATE 35: CPT | Performed by: INTERNAL MEDICINE

## 2024-07-11 PROCEDURE — 85025 COMPLETE CBC W/AUTO DIFF WBC: CPT | Performed by: INTERNAL MEDICINE

## 2024-07-11 PROCEDURE — 80048 BASIC METABOLIC PNL TOTAL CA: CPT | Performed by: INTERNAL MEDICINE

## 2024-07-11 PROCEDURE — 85730 THROMBOPLASTIN TIME PARTIAL: CPT | Performed by: INTERNAL MEDICINE

## 2024-07-11 RX ORDER — WARFARIN SODIUM 1 MG/1
TABLET ORAL
Status: CANCELLED | OUTPATIENT
Start: 2024-07-11

## 2024-07-11 RX ADMIN — OXYCODONE HYDROCHLORIDE 5 MG: 5 TABLET ORAL at 19:50

## 2024-07-11 RX ADMIN — HEPARIN SODIUM 27 UNITS/KG/HR: 10000 INJECTION, SOLUTION INTRAVENOUS at 12:29

## 2024-07-11 RX ADMIN — OXYCODONE HYDROCHLORIDE 10 MG: 10 TABLET ORAL at 06:32

## 2024-07-11 RX ADMIN — FERROUS SULFATE TAB 325 MG (65 MG ELEMENTAL FE) 325 MG: 325 (65 FE) TAB at 09:46

## 2024-07-11 RX ADMIN — OXYCODONE HYDROCHLORIDE 10 MG: 10 TABLET ORAL at 16:38

## 2024-07-11 RX ADMIN — HEPARIN SODIUM 27 UNITS/KG/HR: 10000 INJECTION, SOLUTION INTRAVENOUS at 06:32

## 2024-07-11 RX ADMIN — OXYCODONE HYDROCHLORIDE 10 MG: 10 TABLET ORAL at 02:40

## 2024-07-11 RX ADMIN — OXYCODONE HYDROCHLORIDE 10 MG: 10 TABLET ORAL at 12:16

## 2024-07-11 RX ADMIN — SEVELAMER HYDROCHLORIDE 800 MG: 800 TABLET ORAL at 12:16

## 2024-07-11 RX ADMIN — ACETAMINOPHEN 650 MG: 325 TABLET, FILM COATED ORAL at 19:50

## 2024-07-11 RX ADMIN — CARVEDILOL 6.25 MG: 6.25 TABLET, FILM COATED ORAL at 16:38

## 2024-07-11 RX ADMIN — SEVELAMER HYDROCHLORIDE 800 MG: 800 TABLET ORAL at 16:38

## 2024-07-11 RX ADMIN — TORSEMIDE 100 MG: 100 TABLET ORAL at 09:48

## 2024-07-11 RX ADMIN — ATORVASTATIN CALCIUM 80 MG: 40 TABLET, FILM COATED ORAL at 09:47

## 2024-07-11 RX ADMIN — CARVEDILOL 6.25 MG: 6.25 TABLET, FILM COATED ORAL at 09:47

## 2024-07-11 RX ADMIN — OXYCODONE HYDROCHLORIDE 5 MG: 5 TABLET ORAL at 09:48

## 2024-07-11 RX ADMIN — OXYCODONE HYDROCHLORIDE 10 MG: 10 TABLET ORAL at 21:27

## 2024-07-11 RX ADMIN — AMLODIPINE BESYLATE 10 MG: 10 TABLET ORAL at 09:47

## 2024-07-11 RX ADMIN — FERROUS SULFATE TAB 325 MG (65 MG ELEMENTAL FE) 325 MG: 325 (65 FE) TAB at 16:38

## 2024-07-11 RX ADMIN — HEPARIN SODIUM 27 UNITS/KG/HR: 10000 INJECTION, SOLUTION INTRAVENOUS at 19:51

## 2024-07-11 RX ADMIN — SEVELAMER HYDROCHLORIDE 800 MG: 800 TABLET ORAL at 09:47

## 2024-07-11 RX ADMIN — ISOSORBIDE MONONITRATE 60 MG: 60 TABLET, EXTENDED RELEASE ORAL at 09:47

## 2024-07-11 NOTE — PROGRESS NOTES
[facility]  Progress Note  Name: Joaquin Frankel I  MRN: [id number]  Unit/Bed#: S -01 I Date of Admission: 7/4/2024   Date of Service: 7/11/2024 I Hospital Day: 7    Assessment & Plan   * Chest pain  Assessment & Plan  Patient presented with worsening substernal chest pain over several weeks.  H/o chronic angina, CAD, family history of hereditary heart disease on fathers side.   Patient was admitted for similar chest pain in early June and was found to have ESRD, started on HD MWF.  Recent nuclear stress test without significant reversible ischemia  EKG on admission: Sinus Rhythm with PAC's. Repeat EKG showed sinus bradycardia  High-sensitivity troponin negative x 5   . D-Dimer 4.21  CTA: subsegmental right lower lobe pulmonary embolism  07/05/24: Updated echocardiogram on unremarkable  Patient blood pressure elevated on admission to 194/80, currently stable.  Patient received 2 doses of nitroglycerin in ED and reports it did not improve his pain, but it improved his elevated blood pressure.  Patient was saturating low 80s on room air on admission and required 3L supplemental O2, which improved saturation to 90s. O2 Sat currently 94% to 95% on room air.  MRSA negative. Viral panel negative.    Multifactorial from acute on chronic anemia, hypertensive urgency, and acute PE   Patient's chest pain has resolved following hemodialysis and the improvement of high blood pressure.   7/9/24: Telemetry discontinued    Plan-  Per cardiology recs: Continue amlodipine 5 mg daily,Coreg 6.25 mg BID, Imdur 60 mg daily, atorvastatin 80 mg daily. Holding aspirin 81 mg daily    Pulmonary embolism (HCC)  Assessment & Plan  Patient presented for worsening chest pain, shortness of breath, and dyspnea on exertion.  Patient was admitted for similar chest pain in early June and was found to have ESRD, started on HD MWF   D-Dimer 4.21  CTA showed subsegmental right lower lobe pulmonary  embolism  Placed on heparin drip  Patient no longer on nasal cannula    Plan-  Continue Heparin drip. To be held at 10 AM tomorrow morning for renal biopsy (07/12/2024)  Transition to warfarin 5 mg daily after renal biopsy   Supplemental oxygen as needed    ESRD (end stage renal disease) on dialysis (HCC)  Assessment & Plan  Lab Results   Component Value Date    EGFR 5 07/10/2024    EGFR 7 07/09/2024    EGFR 4 07/08/2024    CREATININE 9.56 (H) 07/10/2024    CREATININE 8.05 (H) 07/09/2024    CREATININE 11.13 (H) 07/08/2024     Patient ESRD on Dialysis 3 times a week, MWF  Elevated , c/o of orthopnea   Etiology of CLOVIS: Etiology not entirely clear - Possible MGRS? Pattern of disease progression is not typical for diabetic nephropathy.   Etiology of CKD: Suspected due to diabetic nephropathy, hypertensive nephrosclerosis, obesity related renal dysfunction  Previous baseline creatinine 1.3-1.6 w/ prior episodes of CLOVIS  Serologic workup negative except for IgG kappa on SPEP and UPEP.  Cryoglobulin negative, ANCA negative, anti-dsDNA negative, anti-GBM negative, JOSE ROBERTO negative, C4 34, C3 118  Bone marrow biopsy 6/12/2024: negative for plasma cell neoplasm.   CRRT initiated 6/7/2024.  Peak creatinine 10.4  Last treatment 7/8/2024 with UF 3.5 L  EDW: 181 kg  electrolytes and acid/base stable  BUN 32 Creatinine 8.13 today    Plan:  Nephrology onboard  IR guided renal biopsy scheduled for tomorrow Friday 07/12/2024. Hold heparin at 10 AM that morning.  NPO after midnight today  Hold oral AC. Resume ASA 81 mg daily after renal biopsy   Continue torsemide 100 mg daily    Hypertension  Assessment & Plan  History of HTN, recent medication change after dx of ESRD  Workup at that time notable for IgG kappa monoclonal gammopathy and s/p bone marrow biopsy on 6/12/2023 to assess for multiple myeloma. Bone marrow biopsy with no evidence of plasma cell neoplasm.   HD on MWF schedule.   Presented to ED with uncontrolled /80.  Endorsed headaches and blurry vision in his left eye in the ED that has since resolved.  BP improved    Plan:  Continue amlodipine 10 mg daily, carvedilol 6.25 mg twice daily, Imdur 60 mg once daily, torsemide 100 mg daily  Hold carvedilol if HR <50  Nephrology and cardiology onbooard   Continue UF with dialysis as BP tolerates     Type 2 diabetes mellitus (HCC)  Assessment & Plan  Lab Results   Component Value Date    HGBA1C 5.7 (H) 06/09/2024       Recent Labs     07/10/24  0730 07/10/24  1119 07/11/24  0719 07/11/24  1620   POCGLU 79 90 86 113       Blood Sugar Average: Last 72 hrs:  (P) [id number]      Anemia  Assessment & Plan  Patient has history of anemia of chronic disease, ESRD  CBC on admission showed hgb 6.6 / hct 21.2  Received 2 units packed RBC's in ED. Hbg 7.6 after transfusion  Hematology following with outpatient f/u scheduled 7/11/2024, patient will reschedule due to overlapping hospital admission   On oral iron twice daily  No active bleeding, on heparin for PE, will switch to oral AC after renal biopsy procedure.  7/8/2024 Patient was transfused 1 unit of blood during hemodialysis.  Hemoglobin 8.8, hematocrit 27.1  S/P 3 units packed RBCs  Start IV Venofer 100 mg with each dialysis treatment for total 10 doses starting 7/10/24   H&H today 9.1/28.9    Plan:  Continue to monitor hemoglobin levels  Transfuse for Hgb <8.0    Hyponatremia  Assessment & Plan  Sodium 131 today, trended down  Likely due to fluid overload.  Patient scheduled for hemodialysis today    Plan:  Per nephro, fluid restriction to 1.5 L/day  Continue with hemodialysis  Follow-up w/ BMP in the a.m.    ANGY (obstructive sleep apnea)  Assessment & Plan  Overnight pulse oximetry obtained: highest O2 sat 99%, his lowest O2 sat 73%, with a mean of 91%. Patient had 120 desaturation events <89%.  He spent an 1 hour and 15 minutes in desaturation (~15.82%) and and 1 hour and 34 minutes with desaturation <89% (~19.77%).  Counseled  patient on importance of maintaining oxygen saturation as oxygen deprivation can lead to hypertension, heart failure, coronary artery disease, pulmonary hypertension, etc.    Patient was advised of need to wear oxygen at night upon discharge. He declines stating he would like to wait for formal evaluation via sleep study as an outpatient.    Chronic acquired lymphedema  Assessment & Plan  Chronic LLE lymphedema x 3 years after surgical resection of LLE cyst    Plan:  Advised compression and elevation of lower extremity for symptomatic edema relief    Acute respiratory failure with hypoxia (HCC)-resolved as of 7/6/2024  Assessment & Plan  Patient O2 saturation on admission was 83% on RA  Was placed on 3/L Nasal canula FiO2 32%  CTA showed right lower lobe subsegmental PE  Was placed on heparin  Pt currently on RA    Plan:  Supplemental oxygen as needed  Continue heparin for PE  Incentive Spirometry           VTE Pharmacologic Prophylaxis: VTE Score: 7 High Risk (Score >/= 5) - Pharmacological DVT Prophylaxis Ordered: heparin drip. Sequential Compression Devices Ordered.    Mobility:   Basic Mobility Inpatient Raw Score: 24  JH-HLM Goal: 8: Walk 250 feet or more  JH-HLM Achieved: 8: Walk 250 feet ot more  JH-HLM Goal achieved. Continue to encourage appropriate mobility.    Patient Centered Rounds: I performed bedside rounds with nursing staff today.   Discussions with Specialists or Other Care Team Provider: cardiology, nephrology    Education and Discussions with Family / Patient: Patient declined call to .     Current Length of Stay: 7 day(s)  Current Patient Status: Inpatient   Discharge Plan: Anticipate discharge in 48-72 hrs to home.    Code Status: Level 1 - Full Code    Subjective:   Patient was seen and evaluated at bedside.  No overnight events.  Patient states he is currently asymptomatic even with increased ambulation. He has no other complaints at this time. Discussed different  anticoagulation choices to take upon discharge with patient. Given BMI of 60, patient chose warfarin. He was given patient instruction handout about warfarin. All questions and concerns were answered at this time    Objective:     Vitals:   Temp (24hrs), Av.2 °F (36.8 °C), Min:97.8 °F (36.6 °C), Max:98.6 °F (37 °C)    Temp:  [97.8 °F (36.6 °C)-98.6 °F (37 °C)] 98.6 °F (37 °C)  HR:  [57-61] 57  Resp:  [18-20] 18  BP: (131-147)/(65-70) 141/70  SpO2:  [91 %-94 %] 94 %  Body mass index is 60.29 kg/m².     Input and Output Summary (last 24 hours):     Intake/Output Summary (Last 24 hours) at 2024 1713  Last data filed at 2024 1300  Gross per 24 hour   Intake 1380 ml   Output 800 ml   Net 580 ml       Physical Exam:   Physical Exam  Vitals and nursing note reviewed.   Constitutional:       General: He is not in acute distress.     Appearance: He is obese. He is not ill-appearing.   HENT:      Head: Normocephalic and atraumatic.      Right Ear: External ear normal.      Left Ear: External ear normal.      Nose: Nose normal.      Mouth/Throat:      Mouth: Mucous membranes are moist.      Pharynx: Oropharynx is clear.   Eyes:      Extraocular Movements: Extraocular movements intact.      Conjunctiva/sclera: Conjunctivae normal.   Cardiovascular:      Rate and Rhythm: Regular rhythm. Bradycardia present.      Pulses: Normal pulses.      Heart sounds: Normal heart sounds. No murmur heard.     No gallop.   Pulmonary:      Effort: Pulmonary effort is normal. No respiratory distress.      Breath sounds: Normal breath sounds. No wheezing.      Comments: Tolerating room air  Abdominal:      General: Bowel sounds are normal. There is no distension.      Palpations: Abdomen is soft.      Tenderness: There is no abdominal tenderness.   Musculoskeletal:         General: Normal range of motion.      Cervical back: Normal range of motion and neck supple. No rigidity.      Right lower leg: Edema (violaceous skin changes  right lower extremity with healing punctate wounds over shin) present.      Left lower leg: Edema (lymphedema) present.      Comments: LLE significantly larger than RLE due to chronic lymphedema. Healed surgical incision noted to left calf.    Skin:     General: Skin is warm and dry.      Comments: R IJV PermCath site clear. Dressing intact.   Neurological:      General: No focal deficit present.      Mental Status: He is alert and oriented to person, place, and time. Mental status is at baseline.   Psychiatric:         Mood and Affect: Mood normal.          Additional Data:     Labs:  Results from last 7 days   Lab Units 07/11/24  0925   WBC Thousand/uL 6.48   HEMOGLOBIN g/dL 9.1*   HEMATOCRIT % 28.9*   PLATELETS Thousands/uL 241   SEGS PCT % 62   LYMPHO PCT % 17   MONO PCT % 10   EOS PCT % 9*     Results from last 7 days   Lab Units 07/11/24  0925   SODIUM mmol/L 131*   POTASSIUM mmol/L 4.0   CHLORIDE mmol/L 94*   CO2 mmol/L 28   BUN mg/dL 32*   CREATININE mg/dL 8.13*   ANION GAP mmol/L 9   CALCIUM mg/dL 9.2   GLUCOSE RANDOM mg/dL 112     Results from last 7 days   Lab Units 07/04/24 2013   INR  1.18     Results from last 7 days   Lab Units 07/11/24  1620 07/11/24  0719 07/10/24  1119 07/10/24  0730 07/09/24  2138 07/09/24  1520 07/09/24  1130 07/09/24  0730 07/08/24  2116 07/08/24  1541 07/08/24  1058 07/08/24  0742   POC GLUCOSE mg/dl 113 86 90 79 94 97 93 82 103 112 99 76               Lines/Drains:  Invasive Devices       Peripheral Intravenous Line  Duration             Peripheral IV 07/09/24 Right;Lower Arm 2 days    Peripheral IV 07/10/24 Left Antecubital <1 day              Hemodialysis Catheter  Duration             HD Permanent Double Catheter 27 days                    Imaging: Reviewed radiology reports from this admission including: chest xray, abdominal/pelvic CT, and ECHO    Recent Cultures (last 7 days):         Last 24 Hours Medication List:   Current Facility-Administered Medications    Medication Dose Route Frequency Provider Last Rate    acetaminophen  650 mg Oral Q6H PRN Mily Martin MD      amLODIPine  10 mg Oral Daily Andrea Longoria MD      atorvastatin  80 mg Oral Daily Mily Martin MD      carvedilol  6.25 mg Oral BID With Meals Andrea Longoria MD      ferrous sulfate  325 mg Oral BID With Meals Mily Martin MD      heparin (porcine)  3-30 Units/kg/hr (Order-Specific) Intravenous Titrated Deya Palomino MD 27 Units/kg/hr (07/11/24 1229)    heparin (porcine)  10,000 Units Intravenous Q6H PRN Monique Rothman DO      heparin (porcine)  5,000 Units Intravenous Q6H PRN Monique Rothman DO      iron sucrose  100 mg Intravenous After Dialysis Johnny Viramontes MD Stopped (07/10/24 2235)    isosorbide mononitrate  60 mg Oral Daily Mily Martin MD      labetalol  10 mg Intravenous Q6H PRN Liana Riley MD      lidocaine  1 patch Topical Daily Mily Martin MD      oxyCODONE  10 mg Oral Q4H PRN Lorri Maria MD      oxyCODONE  5 mg Oral Q4H PRN Lorri Marai MD      sevelamer  800 mg Oral TID With Meals Mily Martin MD      torsemide  100 mg Oral Daily Charles Spear MD          Today, Patient Was Seen By: Deya Palomino MD    **Please Note: This note may have been constructed using a voice recognition system.**

## 2024-07-11 NOTE — PLAN OF CARE
Problem: Potential for Falls  Goal: Patient will remain free of falls  Description: INTERVENTIONS:  - Educate patient/family on patient safety including physical limitations  - Instruct patient to call for assistance with activity   - Consult OT/PT to assist with strengthening/mobility   - Keep Call bell within reach  - Keep bed low and locked with side rails adjusted as appropriate  - Keep care items and personal belongings within reach  - Initiate and maintain comfort rounds  - Make Fall Risk Sign visible to staff  - Offer Toileting every  Hours, in advance of need  - Initiate/Maintain alarm  - Obtain necessary fall risk management equipment:   - Apply yellow socks and bracelet for high fall risk patients  - Consider moving patient to room near nurses station  Outcome: Progressing     Problem: METABOLIC, FLUID AND ELECTROLYTES - ADULT  Goal: Electrolytes maintained within normal limits  Description: INTERVENTIONS:  - Monitor labs and assess patient for signs and symptoms of electrolyte imbalances  - Administer electrolyte replacement as ordered  - Monitor response to electrolyte replacements, including repeat lab results as appropriate  - Instruct patient on fluid and nutrition as appropriate  Outcome: Progressing  Goal: Fluid balance maintained  Description: INTERVENTIONS:  - Monitor labs   - Monitor I/O and WT  - Instruct patient on fluid and nutrition as appropriate  - Assess for signs & symptoms of volume excess or deficit  Outcome: Progressing     Problem: Prexisting or High Potential for Compromised Skin Integrity  Goal: Skin integrity is maintained or improved  Description: INTERVENTIONS:  - Identify patients at risk for skin breakdown  - Assess and monitor skin integrity  - Assess and monitor nutrition and hydration status  - Monitor labs   - Assess for incontinence   - Turn and reposition patient  - Assist with mobility/ambulation  - Relieve pressure over bony prominences  - Avoid friction and  shearing  - Provide appropriate hygiene as needed including keeping skin clean and dry  - Evaluate need for skin moisturizer/barrier cream  - Collaborate with interdisciplinary team   - Patient/family teaching  - Consider wound care consult   Outcome: Progressing     Problem: Nutrition/Hydration-ADULT  Goal: Nutrient/Hydration intake appropriate for improving, restoring or maintaining nutritional needs  Description: Monitor and assess patient's nutrition/hydration status for malnutrition. Collaborate with interdisciplinary team and initiate plan and interventions as ordered.  Monitor patient's weight and dietary intake as ordered or per policy. Utilize nutrition screening tool and intervene as necessary. Determine patient's food preferences and provide high-protein, high-caloric foods as appropriate.     INTERVENTIONS:  - Monitor oral intake, urinary output, labs, and treatment plans  - Assess nutrition and hydration status and recommend course of action  - Evaluate amount of meals eaten  - Assist patient with eating if necessary   - Allow adequate time for meals  - Recommend/ encourage appropriate diets, oral nutritional supplements, and vitamin/mineral supplements  - Order, calculate, and assess calorie counts as needed  - Recommend, monitor, and adjust tube feedings and TPN/PPN based on assessed needs  - Assess need for intravenous fluids  - Provide specific nutrition/hydration education as appropriate  - Include patient/family/caregiver in decisions related to nutrition  Outcome: Progressing

## 2024-07-11 NOTE — PROGRESS NOTES
NEPHROLOGY PROGRESS NOTE   Joaquin Frankel 44 y.o. male MRN: [id number]  Unit/Bed#: S -01 Encounter: [id number]    ASSESSMENT & PLAN:  44-year-old male presented with complaint of chest pain.  Nephrology was consulted for acute kidney injury on HD.  Recent hospital admission at [facility] between Tori 3 Tori 26 during that hospital admission he had a admission creatinine of 6.4 and was started on intermittent HD on 6/7/24 and has been on dialysis dependent since then.  He now presented with complaint of chest pain and CTA on admission revealed PE.    Acute kidney injury on HD, POA  -History of acute kidney injury previous hospital admission in June 2024 and on HD since 6/7/2024.  Current outpatient dialysis clinic [facility] Monday Wednesday Friday.  -Access right IJ PermCath  -Serum creatinine was 1.22 mg/dL in February 2024, increased to 2.98 in April 2024 and then increased to 6.4 in June 2024.  Serological workup was negative except for IgG kappa on SPEP and UPEP.  Cryoglobulin negative, ANCA negative anti-double-stranded DNA negative anti-GBM negative JOSE ROBERTO negative, C3-C4 at normal range.  Bone marrow biopsy done on June 12, 2024 did not reveal a plasma cell neoplasm.  -Etiology: Exact etiology not clear.  Patient does admit to using NSAIDs which could be contributing to CLOVIS.  Also there is possibility of MGRS -plan for kidney biopsy  Plan:  Plan for kidney biopsy this week on Friday  as per my discussion with IR  Continue to hold aspirin.  Recommend holding heparin at 10 AM on Friday and to keep n.p.o. after midnight on Thursday.   Last hemodialysis treatment was on 7/10.  Postdialysis weight 173 kg.  Will decrease dry weight 172.5 kg    Chronic kidney disease stage III: Baseline creatinine 1.2 to 1.5 mg/dL.  No prior renal follow-up.  History of nephrotic range proteinuria in April 2024    Primary hypertension: Blood pressure Stable.  Continue current treatment.  Avoid hypotension.   Currently on amlodipine, Coreg, torsemide. Continue same     Hyponatremia likely due to fluid overload, sodium trended down to 131 continue to monitor, recommend fluid restriction 1.5 L/day and continue UF with HD    Fluid overload: Continue UF with HD   -continue to challenge target weight.  Patient has left lower extremity lymphedema.  Right lower extremity edema has resolved  -decreased TW     Anemia due to iron deficiency as well as chronic kidney disease and chronic illness  -no JOSSELYN due to acute PE.  Iron saturation 17%  -Status post PRBC, hemoglobin currently stable at 9.1 g/dL, continue to monitor.  Order for IV iron 100 mg on 7/10 with each dialysis treatment for total 10 doses starting 7/10    Nephrotic range proteinuria  -Albumin creatinine ratio was 5.2 g in April 2024, plan for kidney biopsy as mentioned above    IgG kappa monoclonal gammopathy, bone marrow biopsy on June 12, 2024 did not reveal plasma cell neoplasm.  Plan for kidney biopsy as mentioned above    CKD/MBD/hyperphosphatemia   -continue sevelamer.  Phosphorus is elevated at 5.8, recommend low phosphorus diet..  If phosphorus remains elevated may need to increase the dose of binders    Pulmonary embolism: On heparin drip.  Hold off on NOAC due to need for kidney biopsy    Left lower extremity lymphedema recommend limb elevation   continue UF with HD    Complaint of chest pain, history of CAD, cardiology on board,  repeat nuclear stress test without significant reversible ischemia.  As per cardiology note most likely due to acute PE chronic anemia and hypertension causing chest pain     Discussed with primary team regarding plan for intermittent HD treatment tomorrow a.m. and for kidney biopsy tomorrow and agreed with the plan      SUBJECTIVE:  No new complaints.  No chest pain or shortness of breath, no nausea vomiting    OBJECTIVE:  Current Weight: Weight - Scale: (!) 175 kg (384 lb 14.8 oz)  Vitals:    07/11/24 0717   BP: 147/65   Pulse: 57    Resp: 18   Temp: 98.6 °F (37 °C)   SpO2: 94%       Intake/Output Summary (Last 24 hours) at 7/11/2024 1241  Last data filed at 7/11/2024 0949  Gross per 24 hour   Intake 1460 ml   Output 4800 ml   Net -3340 ml       Physical Exam  General:  Ill looking, awake.  Obesity  Eyes: Conjunctivae pink,  Sclera anicteric  ENT: lips and mucous membranes moist  Neck: supple   Chest: Clear to Auscultation both lungs,  no crackles, ronchus or wheezing.  CVS: S1 & S2 present, normal rate, regular rhythm, no murmur.  Abdomen: soft, non-tender, non-distended, Bowel sounds normoactive  Extremities: Left leg lymphedema, trace edema on the right lower extremity  Skin: no rash  Neuro: awake, alert, oriented x 3   Psych: Mood and affect appropriate        Medications:    Current Facility-Administered Medications:     acetaminophen (TYLENOL) tablet 650 mg, 650 mg, Oral, Q6H PRN, Mily Martin MD, 650 mg at 07/05/24 1251    amLODIPine (NORVASC) tablet 10 mg, 10 mg, Oral, Daily, Andrea Longoria MD, 10 mg at 07/11/24 0947    atorvastatin (LIPITOR) tablet 80 mg, 80 mg, Oral, Daily, Mily Martin MD, 80 mg at 07/11/24 0947    carvedilol (COREG) tablet 6.25 mg, 6.25 mg, Oral, BID With Meals, Andrea Longoria MD, 6.25 mg at 07/11/24 0947    ferrous sulfate tablet 325 mg, 325 mg, Oral, BID With Meals, Mily Martin MD, 325 mg at 07/11/24 0946    heparin (porcine) 25,000 units in 0.45% NaCl 250 mL infusion (premix), 3-30 Units/kg/hr (Order-Specific), Intravenous, Titrated, Gustavo Neville MD, Last Rate: 33.8 mL/hr at 07/11/24 1229, 27 Units/kg/hr at 07/11/24 1229    heparin (porcine) injection 10,000 Units, 10,000 Units, Intravenous, Q6H PRN, Monique Nicol Wayne Rohrbaugh, DO    heparin (porcine) injection 5,000 Units, 5,000 Units, Intravenous, Q6H PRN, Monique Rothman DO, 5,000 Units at 07/10/24 1114    iron sucrose (VENOFER) 100 mg in sodium chloride 0.9 % 100 mL IVPB, 100 mg, Intravenous, After Dialysis, Johnny Viramontes MD,  Stopped at 07/10/24 2235    isosorbide mononitrate (IMDUR) 24 hr tablet 60 mg, 60 mg, Oral, Daily, Mily Martin MD, 60 mg at 07/11/24 0947    labetalol (NORMODYNE) injection 10 mg, 10 mg, Intravenous, Q6H PRN, Liana Riley MD    lidocaine (LIDODERM) 5 % patch 1 patch, 1 patch, Topical, Daily, Mily Martin MD    oxyCODONE (ROXICODONE) immediate release tablet 10 mg, 10 mg, Oral, Q4H PRN, Lorri Maria MD, 10 mg at 07/11/24 1216    oxyCODONE (ROXICODONE) IR tablet 5 mg, 5 mg, Oral, Q4H PRN, Lorri Maria MD, 5 mg at 07/11/24 0948    sevelamer (RENAGEL) tablet 800 mg, 800 mg, Oral, TID With Meals, Mily Martin MD, 800 mg at 07/11/24 1216    torsemide (DEMADEX) tablet 100 mg, 100 mg, Oral, Daily, Charles Spear MD, 100 mg at 07/11/24 0948    Invasive Devices:        Lab Results:   Results from last 7 days   Lab Units 07/11/24  0925 07/10/24  0517 07/09/24  0433 07/08/24  2021 07/08/24  0518 07/07/24  0515 07/06/24  0456 07/05/24  0411 07/04/24  1538   WBC Thousand/uL 6.48 6.53  --   --  6.73   < > 6.83   < > 6.92   HEMOGLOBIN g/dL 9.1* 8.1*  --  8.8* 7.8*   < > 7.5*   < > 6.6*   HEMATOCRIT % 28.9* 25.9*  --  27.1* 25.0*   < > 24.6*   < > 21.2*   PLATELETS Thousands/uL 241 233  --   --  267   < > 214   < > 254   POTASSIUM mmol/L 4.0 4.3 4.0  --  4.5   < > 4.2   < > 4.5   CHLORIDE mmol/L 94* 96 96  --  95*   < > 96   < > 96   CO2 mmol/L 28 28 30  --  25   < > 29   < > 27   BUN mg/dL 32* 41* 32*  --  51*   < > 33*   < > 27*   CREATININE mg/dL 8.13* 9.56* 8.05*  --  11.13*   < > 8.43*   < > 9.02*   CALCIUM mg/dL 9.2 9.1 8.6  --  8.9   < > 8.8   < > 9.0   MAGNESIUM mg/dL  --   --   --   --   --   --   --   --  1.9   PHOSPHORUS mg/dL  --   --   --   --  5.8*  --  4.6*  --   --    ALK PHOS U/L  --   --   --   --   --   --   --   --  90   ALT U/L  --   --   --   --   --   --   --   --  <3*   AST U/L  --   --   --   --   --   --   --   --  15    < > = values in this interval not displayed.  "      Previous work up:         Portions of the record may have been created with voice recognition software. Occasional wrong word or \"sound a like\" substitutions may have occurred due to the inherent limitations of voice recognition software. Read the chart carefully and recognize, using context, where substitutions have occurred.If you have any questions, please contact the dictating provider.    "

## 2024-07-12 ENCOUNTER — APPOINTMENT (INPATIENT)
Dept: DIALYSIS | Facility: HOSPITAL | Age: 44
DRG: 175 | End: 2024-07-12
Payer: COMMERCIAL

## 2024-07-12 ENCOUNTER — APPOINTMENT (INPATIENT)
Dept: CT IMAGING | Facility: HOSPITAL | Age: 44
DRG: 175 | End: 2024-07-12
Attending: RADIOLOGY
Payer: COMMERCIAL

## 2024-07-12 LAB
ANION GAP SERPL CALCULATED.3IONS-SCNC: 9 MMOL/L (ref 4–13)
APTT PPP: 55 SECONDS (ref 23–37)
BASOPHILS # BLD AUTO: 0.04 THOUSANDS/ÂΜL (ref 0–0.1)
BASOPHILS NFR BLD AUTO: 1 % (ref 0–1)
BUN SERPL-MCNC: 39 MG/DL (ref 5–25)
CALCIUM SERPL-MCNC: 9 MG/DL (ref 8.4–10.2)
CHLORIDE SERPL-SCNC: 95 MMOL/L (ref 96–108)
CO2 SERPL-SCNC: 27 MMOL/L (ref 21–32)
CREAT SERPL-MCNC: 8.84 MG/DL (ref 0.6–1.3)
EOSINOPHIL # BLD AUTO: 0.44 THOUSAND/ÂΜL (ref 0–0.61)
EOSINOPHIL NFR BLD AUTO: 7 % (ref 0–6)
ERYTHROCYTE [DISTWIDTH] IN BLOOD BY AUTOMATED COUNT: 14.2 % (ref 11.6–15.1)
GFR SERPL CREATININE-BSD FRML MDRD: 6 ML/MIN/1.73SQ M
GLUCOSE SERPL-MCNC: 133 MG/DL (ref 65–140)
GLUCOSE SERPL-MCNC: 81 MG/DL (ref 65–140)
GLUCOSE SERPL-MCNC: 82 MG/DL (ref 65–140)
GLUCOSE SERPL-MCNC: 86 MG/DL (ref 65–140)
GLUCOSE SERPL-MCNC: 91 MG/DL (ref 65–140)
HCT VFR BLD AUTO: 25.9 % (ref 36.5–49.3)
HGB BLD-MCNC: 8.1 G/DL (ref 12–17)
IMM GRANULOCYTES # BLD AUTO: 0.03 THOUSAND/UL (ref 0–0.2)
IMM GRANULOCYTES NFR BLD AUTO: 1 % (ref 0–2)
INR PPP: 1.06 (ref 0.84–1.19)
LYMPHOCYTES # BLD AUTO: 0.94 THOUSANDS/ÂΜL (ref 0.6–4.47)
LYMPHOCYTES NFR BLD AUTO: 16 % (ref 14–44)
MCH RBC QN AUTO: 29 PG (ref 26.8–34.3)
MCHC RBC AUTO-ENTMCNC: 31.3 G/DL (ref 31.4–37.4)
MCV RBC AUTO: 93 FL (ref 82–98)
MONOCYTES # BLD AUTO: 0.58 THOUSAND/ÂΜL (ref 0.17–1.22)
MONOCYTES NFR BLD AUTO: 10 % (ref 4–12)
NEUTROPHILS # BLD AUTO: 4.05 THOUSANDS/ÂΜL (ref 1.85–7.62)
NEUTS SEG NFR BLD AUTO: 65 % (ref 43–75)
NRBC BLD AUTO-RTO: 0 /100 WBCS
PLATELET # BLD AUTO: 239 THOUSANDS/UL (ref 149–390)
PMV BLD AUTO: 9.2 FL (ref 8.9–12.7)
POTASSIUM SERPL-SCNC: 4.3 MMOL/L (ref 3.5–5.3)
PROTHROMBIN TIME: 14.4 SECONDS (ref 11.6–14.5)
RBC # BLD AUTO: 2.79 MILLION/UL (ref 3.88–5.62)
SODIUM SERPL-SCNC: 131 MMOL/L (ref 135–147)
WBC # BLD AUTO: 6.08 THOUSAND/UL (ref 4.31–10.16)

## 2024-07-12 PROCEDURE — 99152 MOD SED SAME PHYS/QHP 5/>YRS: CPT | Performed by: STUDENT IN AN ORGANIZED HEALTH CARE EDUCATION/TRAINING PROGRAM

## 2024-07-12 PROCEDURE — 99232 SBSQ HOSP IP/OBS MODERATE 35: CPT | Performed by: INTERNAL MEDICINE

## 2024-07-12 PROCEDURE — 85610 PROTHROMBIN TIME: CPT

## 2024-07-12 PROCEDURE — 85025 COMPLETE CBC W/AUTO DIFF WBC: CPT | Performed by: INTERNAL MEDICINE

## 2024-07-12 PROCEDURE — 77012 CT SCAN FOR NEEDLE BIOPSY: CPT | Performed by: STUDENT IN AN ORGANIZED HEALTH CARE EDUCATION/TRAINING PROGRAM

## 2024-07-12 PROCEDURE — 90935 HEMODIALYSIS ONE EVALUATION: CPT | Performed by: INTERNAL MEDICINE

## 2024-07-12 PROCEDURE — 80048 BASIC METABOLIC PNL TOTAL CA: CPT | Performed by: INTERNAL MEDICINE

## 2024-07-12 PROCEDURE — 82948 REAGENT STRIP/BLOOD GLUCOSE: CPT

## 2024-07-12 PROCEDURE — 85730 THROMBOPLASTIN TIME PARTIAL: CPT | Performed by: INTERNAL MEDICINE

## 2024-07-12 PROCEDURE — 50200 RENAL BIOPSY PERQ: CPT | Performed by: STUDENT IN AN ORGANIZED HEALTH CARE EDUCATION/TRAINING PROGRAM

## 2024-07-12 RX ORDER — POLYETHYLENE GLYCOL 3350 17 G/17G
17 POWDER, FOR SOLUTION ORAL DAILY PRN
Status: DISCONTINUED | OUTPATIENT
Start: 2024-07-12 | End: 2024-07-26 | Stop reason: HOSPADM

## 2024-07-12 RX ORDER — FENTANYL CITRATE 50 UG/ML
INJECTION, SOLUTION INTRAMUSCULAR; INTRAVENOUS AS NEEDED
Status: COMPLETED | OUTPATIENT
Start: 2024-07-12 | End: 2024-07-12

## 2024-07-12 RX ORDER — HEPARIN SODIUM 10000 [USP'U]/100ML
3-30 INJECTION, SOLUTION INTRAVENOUS
Status: DISPENSED | OUTPATIENT
Start: 2024-07-12 | End: 2024-07-15

## 2024-07-12 RX ORDER — MIDAZOLAM HYDROCHLORIDE 2 MG/2ML
INJECTION, SOLUTION INTRAMUSCULAR; INTRAVENOUS AS NEEDED
Status: COMPLETED | OUTPATIENT
Start: 2024-07-12 | End: 2024-07-12

## 2024-07-12 RX ORDER — WARFARIN SODIUM 7.5 MG/1
7.5 TABLET ORAL
Status: DISCONTINUED | OUTPATIENT
Start: 2024-07-12 | End: 2024-07-12

## 2024-07-12 RX ORDER — HYDRALAZINE HYDROCHLORIDE 20 MG/ML
INJECTION INTRAMUSCULAR; INTRAVENOUS AS NEEDED
Status: COMPLETED | OUTPATIENT
Start: 2024-07-12 | End: 2024-07-12

## 2024-07-12 RX ADMIN — CARVEDILOL 6.25 MG: 6.25 TABLET, FILM COATED ORAL at 16:49

## 2024-07-12 RX ADMIN — MIDAZOLAM 1 MG: 1 INJECTION INTRAMUSCULAR; INTRAVENOUS at 14:53

## 2024-07-12 RX ADMIN — FENTANYL CITRATE 50 MCG: 50 INJECTION INTRAMUSCULAR; INTRAVENOUS at 14:45

## 2024-07-12 RX ADMIN — MIDAZOLAM 1 MG: 1 INJECTION INTRAMUSCULAR; INTRAVENOUS at 14:45

## 2024-07-12 RX ADMIN — HYDRALAZINE HYDROCHLORIDE 10 MG: 20 INJECTION, SOLUTION INTRAMUSCULAR; INTRAVENOUS at 14:43

## 2024-07-12 RX ADMIN — OXYCODONE HYDROCHLORIDE 10 MG: 10 TABLET ORAL at 05:29

## 2024-07-12 RX ADMIN — OXYCODONE HYDROCHLORIDE 5 MG: 5 TABLET ORAL at 20:36

## 2024-07-12 RX ADMIN — HEPARIN SODIUM 27 UNITS/KG/HR: 10000 INJECTION, SOLUTION INTRAVENOUS at 02:41

## 2024-07-12 RX ADMIN — OXYCODONE HYDROCHLORIDE 5 MG: 5 TABLET ORAL at 02:32

## 2024-07-12 RX ADMIN — CARVEDILOL 6.25 MG: 6.25 TABLET, FILM COATED ORAL at 12:17

## 2024-07-12 RX ADMIN — ISOSORBIDE MONONITRATE 60 MG: 60 TABLET, EXTENDED RELEASE ORAL at 12:17

## 2024-07-12 RX ADMIN — OXYCODONE HYDROCHLORIDE 10 MG: 10 TABLET ORAL at 16:50

## 2024-07-12 RX ADMIN — HYDRALAZINE HYDROCHLORIDE 10 MG: 20 INJECTION, SOLUTION INTRAMUSCULAR; INTRAVENOUS at 14:50

## 2024-07-12 RX ADMIN — SEVELAMER HYDROCHLORIDE 800 MG: 800 TABLET ORAL at 16:53

## 2024-07-12 RX ADMIN — FENTANYL CITRATE 50 MCG: 50 INJECTION INTRAMUSCULAR; INTRAVENOUS at 14:39

## 2024-07-12 RX ADMIN — MIDAZOLAM 1 MG: 1 INJECTION INTRAMUSCULAR; INTRAVENOUS at 14:39

## 2024-07-12 RX ADMIN — TORSEMIDE 100 MG: 100 TABLET ORAL at 12:16

## 2024-07-12 RX ADMIN — OXYCODONE HYDROCHLORIDE 10 MG: 10 TABLET ORAL at 12:17

## 2024-07-12 RX ADMIN — FENTANYL CITRATE 50 MCG: 50 INJECTION INTRAMUSCULAR; INTRAVENOUS at 14:53

## 2024-07-12 RX ADMIN — FERROUS SULFATE TAB 325 MG (65 MG ELEMENTAL FE) 325 MG: 325 (65 FE) TAB at 12:17

## 2024-07-12 RX ADMIN — FERROUS SULFATE TAB 325 MG (65 MG ELEMENTAL FE) 325 MG: 325 (65 FE) TAB at 16:49

## 2024-07-12 RX ADMIN — OXYCODONE HYDROCHLORIDE 10 MG: 10 TABLET ORAL at 22:04

## 2024-07-12 RX ADMIN — HEPARIN SODIUM 27 UNITS/KG/HR: 10000 INJECTION, SOLUTION INTRAVENOUS at 18:24

## 2024-07-12 RX ADMIN — AMLODIPINE BESYLATE 10 MG: 10 TABLET ORAL at 12:17

## 2024-07-12 RX ADMIN — IRON SUCROSE 100 MG: 20 INJECTION, SOLUTION INTRAVENOUS at 10:33

## 2024-07-12 RX ADMIN — ATORVASTATIN CALCIUM 80 MG: 40 TABLET, FILM COATED ORAL at 12:17

## 2024-07-12 NOTE — PROGRESS NOTES
NEPHROLOGY PROGRESS NOTE   Joaquin Frankel 44 y.o. male MRN: [id number]  Unit/Bed#: S -01 Encounter: [id number]    ASSESSMENT & PLAN:  44-year-old male presented with complaint of chest pain.  Nephrology was consulted for acute kidney injury on HD.  Recent hospital admission at [facility] between Tori 3 Tori 26 during that hospital admission he had a admission creatinine of 6.4 and was started on intermittent HD on 6/7/24 and has been on dialysis dependent since then.  He now presented with complaint of chest pain and CTA on admission revealed PE.    Acute kidney injury on HD, POA  -History of acute kidney injury previous hospital admission in June 2024 and on HD since 6/7/2024.  Current outpatient dialysis [facility] Monday Wednesday Friday.  -Access right IJ PermCath  -Serum creatinine was 1.22 mg/dL in February 2024, increased to 2.98 in April 2024 and then increased to 6.4 in June 2024.  Serological workup was negative except for IgG kappa on SPEP and UPEP.  Cryoglobulin negative, ANCA negative anti-double-stranded DNA negative anti-GBM negative JOSE ROBERTO negative, C3-C4 at normal range.  Bone marrow biopsy done on June 12, 2024 did not reveal a plasma cell neoplasm.  -Etiology: Exact etiology not clear.  Patient does admit to using NSAIDs which could be contributing to CLOVIS.  Also there is possibility of MGRS -plan for kidney biopsy  Plan:  Plan for kidney biopsy this week  Last hemodialysis treatment was on 7/10.  Postdialysis weight 173 kg.   decrease dry weight 172.5 kg will continue to challenge target weight  Patient was seen and examined on hemodialysis treatment, tolerating HD.  Reviewed vitals, dialysis prescription and discussed with hemodialysis nurse  HD prescription:   Time: 4 hours  Sodium:  138   Blood flow (ml/min):  400   Dialyzer: F  160 Potassium:  3.0 Dialysate flow(ml/min):  500   Access:   Bicarbonate:  35 Ultrafiltration goal (kg):  4.0 -4.5    Medications on HD: IV  Venofer     If blood pressure remains elevated at the end of dialysis treatment, will give IV antihypertensive medications prior to kidney biopsy    Chronic kidney disease stage III: Baseline creatinine 1.2 to 1.5 mg/dL.  No prior renal follow-up.  History of nephrotic range proteinuria in April 2024    Primary hypertension: Blood pressure Stable.  Continue current treatment.  Avoid hypotension.  Currently on amlodipine, Coreg, torsemide. Continue same.   Not able to increase the dose of Coreg due to bradycardia.  Would add hydralazine if the blood pressure remains elevated    Hyponatremia likely due to fluid overload, sodium trended down to 131 continue to monitor, recommend fluid restriction 1.5 L/day and continue UF with HD    Fluid overload: Continue UF with HD   -continue to challenge target weight.  Patient has left lower extremity lymphedema.  Right lower extremity edema has resolved  -decreased TW as mentioned above and to drop further if patient tolerates    Anemia due to iron deficiency as well as chronic kidney disease and chronic illness  -no JOSSELYN due to acute PE.  Iron saturation 17%  -Status post PRBC, hemoglobin today dropped to 8.1 g/dL, continue to monitor.  Order for IV iron 100 mg on 7/10 with each dialysis treatment for total 10 doses starting 7/10.  Continue IV iron with hemodialysis treatment    Nephrotic range proteinuria  -Albumin creatinine ratio was 5.2 g in April 2024, plan for kidney biopsy as mentioned above    IgG kappa monoclonal gammopathy, bone marrow biopsy on June 12, 2024 did not reveal plasma cell neoplasm.  Plan for kidney biopsy as mentioned above    CKD/MBD/hyperphosphatemia   -continue sevelamer.  Phosphorus is elevated at 5.8, recommend low phosphorus diet..  If phosphorus remains elevated may need to increase the dose of binders    Pulmonary embolism: On heparin drip.  Hold off on NOAC due to need for kidney biopsy    Left lower extremity lymphedema recommend limb elevation    continue UF with HD    Complaint of chest pain, history of CAD, cardiology on board,  repeat nuclear stress test without significant reversible ischemia.  As per cardiology note most likely due to acute PE chronic anemia and hypertension causing chest pain     Discussed with primary team as well as IR regarding plan for intermittent HD treatment today and agreed with the plan      SUBJECTIVE:  No new complaints, no chest pain or shortness of breath, has chronic left lower extremity lymphedema and uses lymphedema pump at home    OBJECTIVE:  Current Weight: Weight - Scale: (!) 176 kg (388 lb 7.2 oz)  Vitals:    07/12/24 1000   BP: 146/75   Pulse: 57   Resp: 18   Temp:    SpO2:        Intake/Output Summary (Last 24 hours) at 7/12/2024 1025  Last data filed at 7/12/2024 0800  Gross per 24 hour   Intake 860 ml   Output 650 ml   Net 210 ml       Physical Exam  General:  Ill looking, awake.  Eyes: Conjunctivae pink,  Sclera anicteric  ENT: lips and mucous membranes moist  Neck: supple   Chest: Clear to Auscultation both lungs,  no crackles, ronchus or wheezing.  CVS: S1 & S2 present, normal rate, regular rhythm, no murmur.  Abdomen: soft, non-tender, non-distended, Bowel sounds normoactive  Extremities: Left lower extremity lymphedema, trace edema right lower extremity  Skin: no rash  Neuro: awake, alert, oriented  Psych: Mood and affect appropriate      Medications:    Current Facility-Administered Medications:     acetaminophen (TYLENOL) tablet 650 mg, 650 mg, Oral, Q6H PRN, Mily Martin MD, 650 mg at 07/11/24 1950    amLODIPine (NORVASC) tablet 10 mg, 10 mg, Oral, Daily, Andrea Longoria MD, 10 mg at 07/11/24 0947    atorvastatin (LIPITOR) tablet 80 mg, 80 mg, Oral, Daily, Mily Martin MD, 80 mg at 07/11/24 0947    carvedilol (COREG) tablet 6.25 mg, 6.25 mg, Oral, BID With Meals, Andrea Longoria MD, 6.25 mg at 07/11/24 1638    ferrous sulfate tablet 325 mg, 325 mg, Oral, BID With Meals, Mily Martin MD, 325 mg at  07/11/24 1638    heparin (porcine) injection 10,000 Units, 10,000 Units, Intravenous, Q6H PRN, Monique Rothman DO    heparin (porcine) injection 5,000 Units, 5,000 Units, Intravenous, Q6H PRN, Monique Rothman DO, 5,000 Units at 07/10/24 1114    iron sucrose (VENOFER) 100 mg in sodium chloride 0.9 % 100 mL IVPB, 100 mg, Intravenous, After Dialysis, Johnny Viramontes MD, Stopped at 07/10/24 2235    isosorbide mononitrate (IMDUR) 24 hr tablet 60 mg, 60 mg, Oral, Daily, Mily Martin MD, 60 mg at 07/11/24 0947    labetalol (NORMODYNE) injection 10 mg, 10 mg, Intravenous, Q6H PRN, Liana Riley MD    lidocaine (LIDODERM) 5 % patch 1 patch, 1 patch, Topical, Daily, Mily Martin MD    oxyCODONE (ROXICODONE) immediate release tablet 10 mg, 10 mg, Oral, Q4H PRN, Lorri Maria MD, 10 mg at 07/12/24 0529    oxyCODONE (ROXICODONE) IR tablet 5 mg, 5 mg, Oral, Q4H PRN, Lorri Maria MD, 5 mg at 07/12/24 0232    sevelamer (RENAGEL) tablet 800 mg, 800 mg, Oral, TID With Meals, iMly Martin MD, 800 mg at 07/11/24 1638    torsemide (DEMADEX) tablet 100 mg, 100 mg, Oral, Daily, Charles Spear MD, 100 mg at 07/11/24 0948    Invasive Devices:        Lab Results:   Results from last 7 days   Lab Units 07/12/24  0549 07/11/24  0925 07/10/24  0517 07/08/24 2021 07/08/24  0518 07/07/24  0515 07/06/24  0456   WBC Thousand/uL 6.08 6.48 6.53  --  6.73   < > 6.83   HEMOGLOBIN g/dL 8.1* 9.1* 8.1*   < > 7.8*   < > 7.5*   HEMATOCRIT % 25.9* 28.9* 25.9*   < > 25.0*   < > 24.6*   PLATELETS Thousands/uL 239 241 233  --  267   < > 214   POTASSIUM mmol/L 4.3 4.0 4.3   < > 4.5   < > 4.2   CHLORIDE mmol/L 95* 94* 96   < > 95*   < > 96   CO2 mmol/L 27 28 28   < > 25   < > 29   BUN mg/dL 39* 32* 41*   < > 51*   < > 33*   CREATININE mg/dL 8.84* 8.13* 9.56*   < > 11.13*   < > 8.43*   CALCIUM mg/dL 9.0 9.2 9.1   < > 8.9   < > 8.8   PHOSPHORUS mg/dL  --   --   --   --  5.8*  --  4.6*    < > = values in this  "interval not displayed.       Previous work up:         Portions of the record may have been created with voice recognition software. Occasional wrong word or \"sound a like\" substitutions may have occurred due to the inherent limitations of voice recognition software. Read the chart carefully and recognize, using context, where substitutions have occurred.If you have any questions, please contact the dictating provider.    "

## 2024-07-12 NOTE — PLAN OF CARE
Problem: Potential for Falls  Goal: Patient will remain free of falls  Description: INTERVENTIONS:  - Educate patient/family on patient safety including physical limitations  - Instruct patient to call for assistance with activity   - Consult OT/PT to assist with strengthening/mobility   - Keep Call bell within reach  - Keep bed low and locked with side rails adjusted as appropriate  - Keep care items and personal belongings within reach  - Initiate and maintain comfort rounds  - Make Fall Risk Sign visible to staff  - Offer Toileting every  Hours, in advance of need  - Initiate/Maintain alarm  - Obtain necessary fall risk management equipment:   - Apply yellow socks and bracelet for high fall risk patients  - Consider moving patient to room near nurses station  Outcome: Progressing     Problem: METABOLIC, FLUID AND ELECTROLYTES - ADULT  Goal: Electrolytes maintained within normal limits  Description: INTERVENTIONS:  - Monitor labs and assess patient for signs and symptoms of electrolyte imbalances  - Administer electrolyte replacement as ordered  - Monitor response to electrolyte replacements, including repeat lab results as appropriate  - Instruct patient on fluid and nutrition as appropriate  Outcome: Progressing  Goal: Fluid balance maintained  Description: INTERVENTIONS:  - Monitor labs   - Monitor I/O and WT  - Instruct patient on fluid and nutrition as appropriate  - Assess for signs & symptoms of volume excess or deficit  Outcome: Progressing     Problem: Prexisting or High Potential for Compromised Skin Integrity  Goal: Skin integrity is maintained or improved  Description: INTERVENTIONS:  - Identify patients at risk for skin breakdown  - Assess and monitor skin integrity  - Assess and monitor nutrition and hydration status  - Monitor labs   - Assess for incontinence   - Turn and reposition patient  - Assist with mobility/ambulation  - Relieve pressure over bony prominences  - Avoid friction and  shearing  - Provide appropriate hygiene as needed including keeping skin clean and dry  - Evaluate need for skin moisturizer/barrier cream  - Collaborate with interdisciplinary team   - Patient/family teaching  - Consider wound care consult   Outcome: Progressing     Problem: Nutrition/Hydration-ADULT  Goal: Nutrient/Hydration intake appropriate for improving, restoring or maintaining nutritional needs  Description: Monitor and assess patient's nutrition/hydration status for malnutrition. Collaborate with interdisciplinary team and initiate plan and interventions as ordered.  Monitor patient's weight and dietary intake as ordered or per policy. Utilize nutrition screening tool and intervene as necessary. Determine patient's food preferences and provide high-protein, high-caloric foods as appropriate.     INTERVENTIONS:  - Monitor oral intake, urinary output, labs, and treatment plans  - Assess nutrition and hydration status and recommend course of action  - Evaluate amount of meals eaten  - Assist patient with eating if necessary   - Allow adequate time for meals  - Recommend/ encourage appropriate diets, oral nutritional supplements, and vitamin/mineral supplements  - Order, calculate, and assess calorie counts as needed  - Recommend, monitor, and adjust tube feedings and TPN/PPN based on assessed needs  - Assess need for intravenous fluids  - Provide specific nutrition/hydration education as appropriate  - Include patient/family/caregiver in decisions related to nutrition  Outcome: Progressing     Problem: PAIN - ADULT  Goal: Verbalizes/displays adequate comfort level or baseline comfort level  Description: Interventions:  - Encourage patient to monitor pain and request assistance  - Assess pain using appropriate pain scale  - Administer analgesics based on type and severity of pain and evaluate response  - Implement non-pharmacological measures as appropriate and evaluate response  - Consider cultural and social  influences on pain and pain management  - Notify physician/advanced practitioner if interventions unsuccessful or patient reports new pain  Outcome: Progressing     Problem: INFECTION - ADULT  Goal: Absence or prevention of progression during hospitalization  Description: INTERVENTIONS:  - Assess and monitor for signs and symptoms of infection  - Monitor lab/diagnostic results  - Monitor all insertion sites, i.e. indwelling lines, tubes, and drains  - Monitor endotracheal if appropriate and nasal secretions for changes in amount and color  - [place] appropriate cooling/warming therapies per order  - Administer medications as ordered  - Instruct and encourage patient and family to use good hand hygiene technique  - Identify and instruct in appropriate isolation precautions for identified infection/condition  Outcome: Progressing  Goal: Absence of fever/infection during neutropenic period  Description: INTERVENTIONS:  - Monitor WBC    Outcome: Progressing     Problem: SAFETY ADULT  Goal: Patient will remain free of falls  Description: INTERVENTIONS:  - Educate patient/family on patient safety including physical limitations  - Instruct patient to call for assistance with activity   - Consult OT/PT to assist with strengthening/mobility   - Keep Call bell within reach  - Keep bed low and locked with side rails adjusted as appropriate  - Keep care items and personal belongings within reach  - Initiate and maintain comfort rounds  - Make Fall Risk Sign visible to staff  - Offer Toileting every  Hours, in advance of need  - Initiate/Maintain alarm  - Obtain necessary fall risk management equipment:   - Apply yellow socks and bracelet for high fall risk patients  - Consider moving patient to room near nurses station  Outcome: Progressing  Goal: Maintain or return to baseline ADL function  Description: INTERVENTIONS:  -  Assess patient's ability to carry out ADLs; assess patient's baseline for ADL function and identify  physical deficits which impact ability to perform ADLs (bathing, care of mouth/teeth, toileting, grooming, dressing, etc.)  - Assess/evaluate cause of self-care deficits   - Assess range of motion  - Assess patient's mobility; develop plan if impaired  - Assess patient's need for assistive devices and provide as appropriate  - Encourage maximum independence but intervene and supervise when necessary  - Involve family in performance of ADLs  - Assess for home care needs following discharge   - Consider OT consult to assist with ADL evaluation and planning for discharge  - Provide patient education as appropriate  Outcome: Progressing  Goal: Maintains/Returns to pre admission functional level  Description: INTERVENTIONS:  - Perform AM-PAC 6 Click Basic Mobility/ Daily Activity assessment daily.  - Set and communicate daily mobility goal to care team and patient/family/caregiver.   - Collaborate with rehabilitation services on mobility goals if consulted  - Perform Range of Motion  times a day.  - Reposition patient every  hours.  - Dangle patient  times a day  - Stand patient  times a day  - Ambulate patient times a day  - Out of bed to chair times a day   - Out of bed for meals times a day  - Out of bed for toileting  - Record patient progress and toleration of activity level   Outcome: Progressing     Problem: DISCHARGE PLANNING  Goal: Discharge to home or other facility with appropriate resources  Description: INTERVENTIONS:  - Identify barriers to discharge w/patient and caregiver  - Arrange for needed discharge resources and transportation as appropriate  - Identify discharge learning needs (meds, wound care, etc.)  - Arrange for interpretive services to assist at discharge as needed  - Refer to Case Management Department for coordinating discharge planning if the patient needs post-hospital services based on physician/advanced practitioner order or complex needs related to functional status, cognitive ability,  or social support system  Outcome: Progressing     Problem: Knowledge Deficit  Goal: Patient/family/caregiver demonstrates understanding of disease process, treatment plan, medications, and discharge instructions  Description: Complete learning assessment and assess knowledge base.  Interventions:  - Provide teaching at level of understanding  - Provide teaching via preferred learning methods  Outcome: Progressing

## 2024-07-12 NOTE — HEMODIALYSIS
Post-Dialysis RN Treatment Note    Blood Pressure:  Pre 137/67 mm/Hg  Post 127/74 mmHg   EDW  172.5 kg    Weight:  Pre 176 kg   Post 172 kg   Mode of weight measurement: Standing Scale   Volume Removed  4000 ml    Treatment duration 240 minutes    NS given  No    Treatment shortened? No   Medications given during Rx Venofer 100 mg   Estimated Kt/V  1.17   Access type: Permacath/TDC   Access Issues: No    Report called to primary nurse   Yes Lola MAY RN

## 2024-07-12 NOTE — DISCHARGE INSTRUCTIONS
Percutaneous Kidney Biopsy   WHAT YOU NEED TO KNOW:   A percutaneous kidney biopsy is a procedure to remove a small sample of kidney tissue. It may also be done to check for kidney disease or cancer.     DISCHARGE INSTRUCTIONS:   Follow up with your healthcare provider as directed:  Write down your questions so you remember to ask them during your visits.   Wound care:  The Band-Aid may be removed in 24 hours.                                                                                                For more information:   National [employer] Information Clearinghouse  [address] [place] [address]  Phone: [phone]  Web Address: http://kidney.niddk.nih.gov/   Care after your procedure:    1. Limit your activities for 36 hours after your biopsy.    2. No driving day of biopsy.    3. Return to your normal diet. Flat sips of flat soda helps with mild nausea.    4. Remove band-aid or dressing 24 hours after procedure.       Contact Interventional Radiology at [phone]    (ANDRES PATIENTS: Contact Interventional Radiology at [phone]) (CHELSI PATIENTS: Contact Interventional Radiology at [phone]) if:    1. Difficulty breathing, nausea or vomiting.    2  You feel weak or dizzy.    3. Chills or fever above 101 degrees F. You have persistent nausea or vomiting    4. You have severe pain in your abdomen or where your procedure was done.    5  You have blood in your urine. You urinate small amounts or not at all.    4. Develop any redness, swelling, heat, unusual drainage, heavy bruising or bleeding from biopsy site.

## 2024-07-12 NOTE — PLAN OF CARE
Pt on HD treatment for 4 hours with a Uf goal of 3.5 L as tolerated. Pt on a 3 k 2.5 rodri bath for a potassium of 4.3 on 07/12/24.  Problem: METABOLIC, FLUID AND ELECTROLYTES - ADULT  Goal: Electrolytes maintained within normal limits  Description: INTERVENTIONS:  - Monitor labs and assess patient for signs and symptoms of electrolyte imbalances  - Administer electrolyte replacement as ordered  - Monitor response to electrolyte replacements, including repeat lab results as appropriate  - Instruct patient on fluid and nutrition as appropriate  Outcome: Progressing  Goal: Fluid balance maintained  Description: INTERVENTIONS:  - Monitor labs   - Monitor I/O and WT  - Instruct patient on fluid and nutrition as appropriate  - Assess for signs & symptoms of volume excess or deficit  Outcome: Progressing

## 2024-07-12 NOTE — PROGRESS NOTES
[facility]  Progress Note  Name: Joaquin Frankel I  MRN: [id number]  Unit/Bed#: S -01 I Date of Admission: 7/4/2024   Date of Service: 7/12/2024 I Hospital Day: 8    Assessment & Plan   * Chest pain  Assessment & Plan  Patient presented with worsening substernal chest pain over several weeks.  H/o chronic angina, CAD, family history of hereditary heart disease on fathers side.   Patient was admitted for similar chest pain in early June and was found to have ESRD, started on HD MWF.  Recent nuclear stress test without significant reversible ischemia  EKG on admission: Sinus Rhythm with PAC's. Repeat EKG showed sinus bradycardia  High-sensitivity troponin negative x 5   . D-Dimer 4.21  CTA: subsegmental right lower lobe pulmonary embolism  07/05/24: Updated echocardiogram on unremarkable  Patient blood pressure elevated on admission to 194/80, currently stable.  Patient received 2 doses of nitroglycerin in ED and reports it did not improve his pain, but it improved his elevated blood pressure.  Patient was saturating low 80s on room air on admission and required 3L supplemental O2, which improved saturation to 90s. O2 Sat currently 94% to 95% on room air.  MRSA negative. Viral panel negative.  Multifactorial from acute on chronic anemia, hypertensive urgency, and acute PE   Patient's chest pain has resolved following hemodialysis and the improvement of high blood pressure.   7/9/24: Telemetry discontinued    Plan-  Per cardiology recs: Continue amlodipine 5 mg daily,Coreg 6.25 mg BID, Imdur 60 mg daily, atorvastatin 80 mg daily. Holding aspirin 81 mg daily    Pulmonary embolism (HCC)  Assessment & Plan  Patient presented for worsening chest pain, shortness of breath, and dyspnea on exertion.  Patient was admitted for similar chest pain in early June and was found to have ESRD, started on HD MWF   D-Dimer 4.21  CTA showed subsegmental right lower lobe pulmonary embolism  Placed  on heparin drip  7/12/24 Heparin was subtherapeutic this morning, PTT 55. No bolus was given or increase in gtt as patient will have the gtt stopped at 10 am pending IR procedure.    Plan-  Hold heparin at 10 AM  Transition to warfarin 5 mg daily after renal biopsy   Supplemental oxygen as needed    ESRD (end stage renal disease) on dialysis (HCC)  Assessment & Plan  Lab Results   Component Value Date    EGFR 6 07/12/2024    EGFR 7 07/11/2024    EGFR 5 07/10/2024    CREATININE 8.84 (H) 07/12/2024    CREATININE 8.13 (H) 07/11/2024    CREATININE 9.56 (H) 07/10/2024     Patient ESRD on Dialysis 3 times a week, MWF  Elevated , c/o of orthopnea   Etiology of CLOVIS: Etiology not entirely clear - Possible MGRS? Pattern of disease progression is not typical for diabetic nephropathy.   Etiology of CKD: Suspected due to diabetic nephropathy, hypertensive nephrosclerosis, obesity related renal dysfunction  Previous baseline creatinine 1.3-1.6 w/ prior episodes of CLOVIS  Serologic workup negative except for IgG kappa on SPEP and UPEP.  Cryoglobulin negative, ANCA negative, anti-dsDNA negative, anti-GBM negative, JOSE ROBERTO negative, C4 34, C3 118  Bone marrow biopsy 6/12/2024: negative for plasma cell neoplasm.   CRRT initiated 6/7/2024.  Peak creatinine 10.4  Last dialyzed 7/12/2024 removed 4 L  Dry weight decreased to 172.5 kg   electrolytes and acid/base stable    Plan:  Nephrology onboard  Remain n.p.o. as IR guided renal biopsy is today   Hold heparin at 10 AM  Hold oral AC. Resume ASA 81 mg daily after renal biopsy   Continue torsemide 100 mg daily    Hypertension  Assessment & Plan  History of HTN, recent medication change after dx of ESRD  Workup at that time notable for IgG kappa monoclonal gammopathy and s/p bone marrow biopsy on 6/12/2023 to assess for multiple myeloma. Bone marrow biopsy with no evidence of plasma cell neoplasm.   HD on MWF schedule.   Presented to ED with uncontrolled /80. Endorsed headaches and  blurry vision in his left eye in the ED that has since resolved.  BP improved    Plan:  Continue amlodipine 10 mg daily, carvedilol 6.25 mg twice daily, Imdur 60 mg once daily, torsemide 100 mg daily  Hold carvedilol if HR <50  Nephrology and cardiology onbooard   Continue UF with dialysis as BP tolerates     Type 2 diabetes mellitus (HCC)  Assessment & Plan  Lab Results   Component Value Date    HGBA1C 5.7 (H) 06/09/2024       Recent Labs     07/11/24  1620 07/11/24 2054 07/12/24  0742 07/12/24  1123   POCGLU 113 113 86 81       Blood Sugar Average: Last 72 hrs:  (P) [id number]      Anemia  Assessment & Plan    Hemoglobin   Date Value Ref Range Status   07/12/2024 8.1 (L) 12.0 - 17.0 g/dL Final   01/23/2017 14.5 12.6 - 17.7 g/dL Final     Hematocrit   Date Value Ref Range Status   07/12/2024 25.9 (L) 36.5 - 49.3 % Final   01/23/2017 43.8 37.5 - 51.0 % Final       Patient has history of anemia of chronic disease, ESRD  CBC on admission showed hgb 6.6 / hct 21.2  Received 2 units packed RBC's in ED. Hbg 7.6 after transfusion  Hematology following with outpatient f/u scheduled 7/11/2024, patient will reschedule due to overlapping hospital admission   On oral iron twice daily  No active bleeding, on heparin for PE, will switch to oral AC after renal biopsy procedure.  7/8/2024 Patient was transfused 1 unit of blood during hemodialysis.  Hemoglobin 8.8, hematocrit 27.1  S/P 3 units packed RBCs  Start IV Venofer 100 mg with each dialysis treatment for total 10 doses starting 7/10/24     Plan:  Continue to monitor hemoglobin levels  Transfuse for Hgb <8.0    Hyponatremia  Assessment & Plan  Lab Results   Component Value Date/Time    SODIUM 131 (L) 07/12/2024 05:49 AM     Sodium trended down  Likely due to fluid overload.  Patient scheduled for hemodialysis today    Plan:  Per nephro, fluid restriction to 1.5 L/day  Continue with hemodialysis  Follow-up w/ BMP in the a.m.    ANGY (obstructive sleep  apnea)  Assessment & Plan  Overnight pulse oximetry obtained: highest O2 sat 99%, his lowest O2 sat 73%, with a mean of 91%. Patient had 120 desaturation events <89%.  He spent an 1 hour and 15 minutes in desaturation (~15.82%) and and 1 hour and 34 minutes with desaturation <89% (~19.77%).  Counseled patient on importance of maintaining oxygen saturation as oxygen deprivation can lead to hypertension, heart failure, coronary artery disease, pulmonary hypertension, etc.    Patient was advised of need to wear oxygen at night upon discharge. He declines stating he would like to wait for formal evaluation via sleep study as an outpatient.    Chronic acquired lymphedema  Assessment & Plan  Chronic LLE lymphedema x 3 years after surgical resection of LLE cyst    Plan:  Advised compression and elevation of lower extremity for symptomatic edema relief    Acute respiratory failure with hypoxia (HCC)-resolved as of 7/6/2024  Assessment & Plan  Patient O2 saturation on admission was 83% on RA  Was placed on 3/L Nasal canula FiO2 32%  CTA showed right lower lobe subsegmental PE  Was placed on heparin    Plan:  Supplemental oxygen as needed  Continue heparin for PE  Incentive Spirometry           VTE Pharmacologic Prophylaxis: VTE Score: 7 High Risk (Score >/= 5) - Pharmacological DVT Prophylaxis Ordered: heparin drip. Sequential Compression Devices Ordered.    Mobility:   Basic Mobility Inpatient Raw Score: 24  JH-HLM Goal: 8: Walk 250 feet or more  JH-HLM Achieved: 8: Walk 250 feet ot more  JH-HLM Goal achieved. Continue to encourage appropriate mobility.    Patient Centered Rounds: I performed bedside rounds with nursing staff today.   Discussions with Specialists or Other Care Team Provider: cardiology, nephrology    Education and Discussions with Family / Patient: Patient declined call to .     Current Length of Stay: 8 day(s)  Current Patient Status: Inpatient   Discharge Plan: Anticipate discharge in 48-72  hrs to home.    Code Status: Level 1 - Full Code    Subjective:   Patient was seen and evaluated at bedside.  He is currently being dialyzed.  No overnight events. Patient continues to tolerate increased activity without chest pain or shortness of breath. He does report back/sacral pain over previous biopsy site that is exacerbated by lying in the hospital bed and sitting in the chairs. Discussed patient's current narcotic use and the need to use other alternatives for pain management. He is agreeable to decrease narcotic use after his renal biopsy and being discharge on a short of oxycodone. All questions and concerns were answered at this time    Objective:     Vitals:   Temp (24hrs), Av.9 °F (36.6 °C), Min:97.8 °F (36.6 °C), Max:98 °F (36.7 °C)    Temp:  [97.8 °F (36.6 °C)-98 °F (36.7 °C)] 98 °F (36.7 °C)  HR:  [52-61] 61  Resp:  [16-18] 16  BP: (102-152)/(61-82) 127/74  SpO2:  [91 %-94 %] 92 %  Body mass index is 60.84 kg/m².     Input and Output Summary (last 24 hours):     Intake/Output Summary (Last 24 hours) at 2024 1417  Last data filed at 2024 1326  Gross per 24 hour   Intake 930 ml   Output 5900 ml   Net -4970 ml       Physical Exam:   Physical Exam  Vitals and nursing note reviewed.   Constitutional:       General: He is not in acute distress.     Appearance: He is obese. He is not ill-appearing.   HENT:      Head: Normocephalic and atraumatic.      Right Ear: External ear normal.      Left Ear: External ear normal.      Nose: Nose normal.      Mouth/Throat:      Mouth: Mucous membranes are moist.      Pharynx: Oropharynx is clear.   Eyes:      Extraocular Movements: Extraocular movements intact.      Conjunctiva/sclera: Conjunctivae normal.   Cardiovascular:      Rate and Rhythm: Regular rhythm. Bradycardia present.      Pulses: Normal pulses.      Heart sounds: Normal heart sounds. No murmur heard.     No gallop.   Pulmonary:      Effort: Pulmonary effort is normal. No respiratory  distress.      Breath sounds: Normal breath sounds. No wheezing.      Comments: Tolerating room air  Abdominal:      General: Bowel sounds are normal. There is no distension.      Palpations: Abdomen is soft.      Tenderness: There is no abdominal tenderness.   Musculoskeletal:         General: Normal range of motion.      Cervical back: Normal range of motion and neck supple. No rigidity.      Right lower leg: Edema (violaceous skin changes right lower extremity with healing punctate wounds over shin) present.      Left lower leg: Edema (lymphedema) present.      Comments: LLE significantly larger than RLE due to chronic lymphedema. Healed surgical incision noted to left calf.    Skin:     General: Skin is warm and dry.      Comments: R IJV PermCath site clear. Dressing intact.   Neurological:      General: No focal deficit present.      Mental Status: He is alert and oriented to person, place, and time. Mental status is at baseline.   Psychiatric:         Mood and Affect: Mood normal.          Additional Data:     Labs:  Results from last 7 days   Lab Units 07/12/24  0549   WBC Thousand/uL 6.08   HEMOGLOBIN g/dL 8.1*   HEMATOCRIT % 25.9*   PLATELETS Thousands/uL 239   SEGS PCT % 65   LYMPHO PCT % 16   MONO PCT % 10   EOS PCT % 7*     Results from last 7 days   Lab Units 07/12/24  0549   SODIUM mmol/L 131*   POTASSIUM mmol/L 4.3   CHLORIDE mmol/L 95*   CO2 mmol/L 27   BUN mg/dL 39*   CREATININE mg/dL 8.84*   ANION GAP mmol/L 9   CALCIUM mg/dL 9.0   GLUCOSE RANDOM mg/dL 91           Results from last 7 days   Lab Units 07/12/24  1123 07/12/24  0742 07/11/24  2054 07/11/24  1620 07/11/24  0719 07/10/24  1119 07/10/24  0730 07/09/24  2138 07/09/24  1520 07/09/24  1130 07/09/24  0730 07/08/24  2116   POC GLUCOSE mg/dl 81 86 113 113 86 90 79 94 97 93 82 103               Lines/Drains:  Invasive Devices       Peripheral Intravenous Line  Duration             Peripheral IV 07/09/24 Right;Lower Arm 3 days    Peripheral IV  07/10/24 Left Antecubital 1 day              Hemodialysis Catheter  Duration             HD Permanent Double Catheter 27 days                    Imaging: Reviewed radiology reports from this admission including: chest xray, abdominal/pelvic CT, and ECHO    Recent Cultures (last 7 days):         Last 24 Hours Medication List:   Current Facility-Administered Medications   Medication Dose Route Frequency Provider Last Rate    acetaminophen  650 mg Oral Q6H PRN Mily Martin MD      amLODIPine  10 mg Oral Daily Andrea Longoria MD      atorvastatin  80 mg Oral Daily Mily Martin MD      carvedilol  6.25 mg Oral BID With Meals Andrea Longoria MD      ferrous sulfate  325 mg Oral BID With Meals Mily Martin MD      heparin (porcine)  10,000 Units Intravenous Q6H PRN Monique Rothman DO      heparin (porcine)  5,000 Units Intravenous Q6H PRN Monique Rothman DO      iron sucrose  100 mg Intravenous After Dialysis Johnny Viramontes MD Stopped (07/12/24 1134)    isosorbide mononitrate  60 mg Oral Daily Mily Martin MD      labetalol  10 mg Intravenous Q6H PRN Liana Riley MD      lidocaine  1 patch Topical Daily Mily Martin MD      oxyCODONE  10 mg Oral Q4H PRN Lorri Maria MD      oxyCODONE  5 mg Oral Q4H PRN Lorri Maria MD      sevelamer  800 mg Oral TID With Meals Mily Martin MD      torsemide  100 mg Oral Daily Charles Spear MD          Today, Patient Was Seen By: Deya Palomino MD    **Please Note: This note may have been constructed using a voice recognition system.**

## 2024-07-12 NOTE — BRIEF OP NOTE (RAD/CATH)
INTERVENTIONAL RADIOLOGY PROCEDURE NOTE    Date: 7/12/2024    Procedure:   Procedure Summary       Date:  Room / Location:     Anesthesia Start:  Anesthesia Stop:     Procedure:  Diagnosis:     Scheduled Providers:  Responsible Provider:     Anesthesia Type: Not recorded ASA Status: Not recorded            Preoperative diagnosis:   1. Anginal chest pain at rest    2. Hypoxia    3. Pulmonary embolism (HCC)    4. Acute on chronic diastolic congestive heart failure (HCC)    5. ESRD on dialysis (HCC)    6. Chest pain, unspecified type         Postoperative diagnosis: Same.    Surgeon: Radames Liu MD     Assistant: None. No qualified resident was available.    Blood loss: None    Specimens: None     Findings:     SBP markedly elevated at presentation (>160) despite good control over the last day, likely 2/2 anxiety.  Decision made to begin sedation and titration of SBP with antihypertensives in hopes of accomplishing biopsy.    After gradual administration of 2 mg Versed, 100 mcg Fentanyl, 20 mg hydralazine, he was in an appropriate moderate sedation state.  SBP remained ~158 despite his comfortable appearance.    An additional dose of 1mg Versed and 50 mcg Fentanyl was administered.  This placed him into a deep sedation state (asleep, snoring, irregular respirations requiring supplemental oxygen).  Despite this his SBP was 148.    Due to irregular respirations, safe targeting of his kidney would have been very difficult at this point.    Decision was made to discontinue biopsy after approximately 40 minutes of titrating medications.    In order to optimize competing risks of blood pressure and respiratory safety, biopsy will likely need to be performed with anesthesiology support.    Complications: None immediate.    Anesthesia: conscious sedation

## 2024-07-12 NOTE — INTERVAL H&P NOTE
Update:     Mr. Frankel presents today for renal biopsy to help determine the etiology of his renal failure and guide ongoing management.    Physical Exam:  General: No acute distress.  HEENT: NC/AT.  EOMI.  CV: RRR  Chest: Normal respiratory effort.  Speaking in full sentences.  Abdomen: Obese, soft, ND/NT.  Skin: Warm and dry  Neuro: Alert and oriented x 3.  No focal deficits.    Patient re-evaluated. Accept as history and physical.    Radames Liu MD/July 12, 2024/2:28 PM

## 2024-07-13 LAB
ANION GAP SERPL CALCULATED.3IONS-SCNC: 10 MMOL/L (ref 4–13)
APTT PPP: 60 SECONDS (ref 23–37)
APTT PPP: 62 SECONDS (ref 23–37)
BUN SERPL-MCNC: 27 MG/DL (ref 5–25)
CALCIUM SERPL-MCNC: 9.3 MG/DL (ref 8.4–10.2)
CHLORIDE SERPL-SCNC: 95 MMOL/L (ref 96–108)
CO2 SERPL-SCNC: 27 MMOL/L (ref 21–32)
CREAT SERPL-MCNC: 7.33 MG/DL (ref 0.6–1.3)
ERYTHROCYTE [DISTWIDTH] IN BLOOD BY AUTOMATED COUNT: 14.1 % (ref 11.6–15.1)
GFR SERPL CREATININE-BSD FRML MDRD: 8 ML/MIN/1.73SQ M
GLUCOSE SERPL-MCNC: 103 MG/DL (ref 65–140)
GLUCOSE SERPL-MCNC: 117 MG/DL (ref 65–140)
GLUCOSE SERPL-MCNC: 126 MG/DL (ref 65–140)
GLUCOSE SERPL-MCNC: 82 MG/DL (ref 65–140)
GLUCOSE SERPL-MCNC: 95 MG/DL (ref 65–140)
HCT VFR BLD AUTO: 30 % (ref 36.5–49.3)
HGB BLD-MCNC: 9.4 G/DL (ref 12–17)
MCH RBC QN AUTO: 29 PG (ref 26.8–34.3)
MCHC RBC AUTO-ENTMCNC: 31.3 G/DL (ref 31.4–37.4)
MCV RBC AUTO: 93 FL (ref 82–98)
PLATELET # BLD AUTO: 261 THOUSANDS/UL (ref 149–390)
PMV BLD AUTO: 9.1 FL (ref 8.9–12.7)
POTASSIUM SERPL-SCNC: 4.4 MMOL/L (ref 3.5–5.3)
RBC # BLD AUTO: 3.24 MILLION/UL (ref 3.88–5.62)
SODIUM SERPL-SCNC: 132 MMOL/L (ref 135–147)
WBC # BLD AUTO: 5.99 THOUSAND/UL (ref 4.31–10.16)

## 2024-07-13 PROCEDURE — 85730 THROMBOPLASTIN TIME PARTIAL: CPT | Performed by: INTERNAL MEDICINE

## 2024-07-13 PROCEDURE — 80048 BASIC METABOLIC PNL TOTAL CA: CPT

## 2024-07-13 PROCEDURE — 99232 SBSQ HOSP IP/OBS MODERATE 35: CPT | Performed by: INTERNAL MEDICINE

## 2024-07-13 PROCEDURE — 85027 COMPLETE CBC AUTOMATED: CPT

## 2024-07-13 PROCEDURE — 82948 REAGENT STRIP/BLOOD GLUCOSE: CPT

## 2024-07-13 PROCEDURE — 99233 SBSQ HOSP IP/OBS HIGH 50: CPT | Performed by: INTERNAL MEDICINE

## 2024-07-13 RX ORDER — ONDANSETRON 2 MG/ML
4 INJECTION INTRAMUSCULAR; INTRAVENOUS ONCE
Status: DISCONTINUED | OUTPATIENT
Start: 2024-07-13 | End: 2024-07-26 | Stop reason: HOSPADM

## 2024-07-13 RX ORDER — NYSTATIN 100000 [USP'U]/G
POWDER TOPICAL 2 TIMES DAILY
Status: DISCONTINUED | OUTPATIENT
Start: 2024-07-13 | End: 2024-07-26 | Stop reason: HOSPADM

## 2024-07-13 RX ADMIN — HEPARIN SODIUM 27 UNITS/KG/HR: 10000 INJECTION, SOLUTION INTRAVENOUS at 20:15

## 2024-07-13 RX ADMIN — OXYCODONE HYDROCHLORIDE 10 MG: 10 TABLET ORAL at 16:42

## 2024-07-13 RX ADMIN — OXYCODONE HYDROCHLORIDE 10 MG: 10 TABLET ORAL at 22:22

## 2024-07-13 RX ADMIN — OXYCODONE HYDROCHLORIDE 5 MG: 5 TABLET ORAL at 12:18

## 2024-07-13 RX ADMIN — TORSEMIDE 100 MG: 100 TABLET ORAL at 09:11

## 2024-07-13 RX ADMIN — SEVELAMER HYDROCHLORIDE 800 MG: 800 TABLET ORAL at 16:42

## 2024-07-13 RX ADMIN — OXYCODONE HYDROCHLORIDE 5 MG: 5 TABLET ORAL at 19:57

## 2024-07-13 RX ADMIN — CARVEDILOL 6.25 MG: 6.25 TABLET, FILM COATED ORAL at 16:42

## 2024-07-13 RX ADMIN — ATORVASTATIN CALCIUM 80 MG: 40 TABLET, FILM COATED ORAL at 09:11

## 2024-07-13 RX ADMIN — CARVEDILOL 6.25 MG: 6.25 TABLET, FILM COATED ORAL at 09:12

## 2024-07-13 RX ADMIN — ISOSORBIDE MONONITRATE 60 MG: 60 TABLET, EXTENDED RELEASE ORAL at 09:11

## 2024-07-13 RX ADMIN — SEVELAMER HYDROCHLORIDE 800 MG: 800 TABLET ORAL at 09:12

## 2024-07-13 RX ADMIN — NYSTATIN: 100000 POWDER TOPICAL at 12:19

## 2024-07-13 RX ADMIN — NYSTATIN: 100000 POWDER TOPICAL at 17:07

## 2024-07-13 RX ADMIN — OXYCODONE HYDROCHLORIDE 5 MG: 5 TABLET ORAL at 00:41

## 2024-07-13 RX ADMIN — SEVELAMER HYDROCHLORIDE 800 MG: 800 TABLET ORAL at 12:18

## 2024-07-13 RX ADMIN — FERROUS SULFATE TAB 325 MG (65 MG ELEMENTAL FE) 325 MG: 325 (65 FE) TAB at 09:16

## 2024-07-13 RX ADMIN — OXYCODONE HYDROCHLORIDE 10 MG: 10 TABLET ORAL at 06:15

## 2024-07-13 RX ADMIN — HEPARIN SODIUM 27 UNITS/KG/HR: 10000 INJECTION, SOLUTION INTRAVENOUS at 12:18

## 2024-07-13 RX ADMIN — FERROUS SULFATE TAB 325 MG (65 MG ELEMENTAL FE) 325 MG: 325 (65 FE) TAB at 16:42

## 2024-07-13 RX ADMIN — AMLODIPINE BESYLATE 10 MG: 10 TABLET ORAL at 09:12

## 2024-07-13 RX ADMIN — HEPARIN SODIUM 27 UNITS/KG/HR: 10000 INJECTION, SOLUTION INTRAVENOUS at 01:26

## 2024-07-13 RX ADMIN — HEPARIN SODIUM 27 UNITS/KG/HR: 10000 INJECTION, SOLUTION INTRAVENOUS at 06:15

## 2024-07-13 RX ADMIN — OXYCODONE HYDROCHLORIDE 10 MG: 10 TABLET ORAL at 02:00

## 2024-07-13 RX ADMIN — OXYCODONE HYDROCHLORIDE 10 MG: 10 TABLET ORAL at 10:31

## 2024-07-13 NOTE — PROGRESS NOTES
[facility]  Progress Note  Name: Joaquin Frankel I  MRN: [id number]  Unit/Bed#: S -01 I Date of Admission: 7/4/2024   Date of Service: 7/13/2024 I Hospital Day: 9    Assessment & Plan   * Chest pain  Assessment & Plan  Patient presented with worsening substernal chest pain over several weeks.  H/o chronic angina, CAD, family history of hereditary heart disease on fathers side.   Patient was admitted for similar chest pain in early June and was found to have ESRD, started on HD MWF.  Recent nuclear stress test without significant reversible ischemia  EKG on admission: Sinus Rhythm with PAC's. Repeat EKG showed sinus bradycardia  High-sensitivity troponin negative x 5   . D-Dimer 4.21  CTA: subsegmental right lower lobe pulmonary embolism  07/05/24: Updated echocardiogram on unremarkable  Patient blood pressure elevated on admission to 194/80, currently stable.  Patient received 2 doses of nitroglycerin in ED and reports it did not improve his pain, but it improved his elevated blood pressure.  Patient was saturating low 80s on room air on admission and required 3L supplemental O2, which improved saturation to 90s. O2 Sat currently 94% to 95% on room air.  MRSA negative. Viral panel negative.  Multifactorial from acute on chronic anemia, hypertensive urgency, and acute PE   Patient's chest pain has resolved following hemodialysis and the improvement of high blood pressure.   7/9: Telemetry discontinued  7/12: Lab holiday. Will get labs on Monday    Plan-  Per cardiology recs: Continue amlodipine 5 mg daily,Coreg 6.25 mg BID, Imdur 60 mg daily, atorvastatin 80 mg daily. Holding aspirin 81 mg daily    Pulmonary embolism (HCC)  Assessment & Plan  Patient presented for worsening chest pain, shortness of breath, and dyspnea on exertion.  Patient was admitted for similar chest pain in early June and was found to have ESRD, started on HD MWF   D-Dimer 4.21  CTA showed  subsegmental right lower lobe pulmonary embolism  Placed on heparin drip  7/12/24 Heparin was subtherapeutic this morning, PTT 55. No bolus was given or increase in gtt as patient will have the gtt stopped at 10 am pending IR procedure.    Plan-  Resume heparin   Transition to warfarin 5 mg daily after renal biopsy   Supplemental oxygen as needed    ESRD (end stage renal disease) on dialysis (HCC)  Assessment & Plan  Lab Results   Component Value Date    EGFR 8 07/13/2024    EGFR 6 07/12/2024    EGFR 7 07/11/2024    CREATININE 7.33 (H) 07/13/2024    CREATININE 8.84 (H) 07/12/2024    CREATININE 8.13 (H) 07/11/2024     Patient ESRD on Dialysis 3 times a week, MWF  Elevated , c/o of orthopnea   Etiology of CLOVIS: Etiology not entirely clear - Possible MGRS? Pattern of disease progression is not typical for diabetic nephropathy.   Etiology of CKD: Suspected due to diabetic nephropathy, hypertensive nephrosclerosis, obesity related renal dysfunction  Previous baseline creatinine 1.3-1.6 w/ prior episodes of CLOVIS  Serologic workup negative except for IgG kappa on SPEP and UPEP.  Cryoglobulin negative, ANCA negative, anti-dsDNA negative, anti-GBM negative, JOSE ROBERTO negative, C4 34, C3 118  Bone marrow biopsy 6/12/2024: negative for plasma cell neoplasm.   CRRT initiated 6/7/2024.  Peak creatinine 10.4  Last dialyzed 7/12/2024 removed 4 L  Dry weight decreased to 172.5 kg   electrolytes and acid/base stable  7/12 renal biopsy was not performed due to hypertension despite sedation and antihypertensive medications. Tentatively rescheduled for Monday or Tuesday next week.    Plan:  Nephrology onboard  Resume heparin  Hold oral AC. Resume ASA 81 mg daily after renal biopsy   Continue torsemide 100 mg daily    Hypertension  Assessment & Plan  History of HTN, recent medication change after dx of ESRD  Workup at that time notable for IgG kappa monoclonal gammopathy and s/p bone marrow biopsy on 6/12/2023 to assess for multiple  myeloma. Bone marrow biopsy with no evidence of plasma cell neoplasm.   HD on MWF schedule.   Presented to ED with uncontrolled /80. Endorsed headaches and blurry vision in his left eye in the ED that has since resolved.  BP improved    Plan:  Continue amlodipine 10 mg daily, carvedilol 6.25 mg twice daily, Imdur 60 mg once daily, torsemide 100 mg daily  Hold carvedilol if HR <50  Nephrology and cardiology onbooard   Continue UF with dialysis as BP tolerates     Type 2 diabetes mellitus (HCC)  Assessment & Plan  Lab Results   Component Value Date    HGBA1C 5.7 (H) 06/09/2024       Recent Labs     07/12/24  1521 07/12/24  2157 07/13/24  0808 07/13/24  1145   POCGLU 82 133 95 103       Blood Sugar Average: Last 72 hrs:  (P) [id number]      Anemia  Assessment & Plan    Hemoglobin   Date Value Ref Range Status   07/13/2024 9.4 (L) 12.0 - 17.0 g/dL Final   01/23/2017 14.5 12.6 - 17.7 g/dL Final     Hematocrit   Date Value Ref Range Status   07/13/2024 30.0 (L) 36.5 - 49.3 % Final   01/23/2017 43.8 37.5 - 51.0 % Final       Patient has history of anemia of chronic disease, ESRD  CBC on admission showed hgb 6.6 / hct 21.2  Received 2 units packed RBC's in ED. Hbg 7.6 after transfusion  Hematology following with outpatient f/u scheduled 7/11/2024, patient will reschedule due to overlapping hospital admission   On oral iron twice daily  No active bleeding, on heparin for PE, will switch to oral AC after renal biopsy procedure.  7/8/2024 Patient was transfused 1 unit of blood during hemodialysis.  Hemoglobin 8.8, hematocrit 27.1  S/P 3 units packed RBCs  Start IV Venofer 100 mg with each dialysis treatment for total 10 doses starting 7/10/24     Plan:  Continue to monitor hemoglobin levels  Transfuse for Hgb <8.0    Hyponatremia  Assessment & Plan  Lab Results   Component Value Date/Time    SODIUM 132 (L) 07/13/2024 07:45 AM     Sodium trended down, but improved from yesterday  Likely due to fluid overload.      Plan:  Per nephro, fluid restriction to 1.5 L/day  Continue with hemodialysis  F/u  BMP 7/15    ANGY (obstructive sleep apnea)  Assessment & Plan  Overnight pulse oximetry obtained: highest O2 sat 99%, his lowest O2 sat 73%, with a mean of 91%. Patient had 120 desaturation events <89%.  He spent an 1 hour and 15 minutes in desaturation (~15.82%) and and 1 hour and 34 minutes with desaturation <89% (~19.77%).  Counseled patient on importance of maintaining oxygen saturation as oxygen deprivation can lead to hypertension, heart failure, coronary artery disease, pulmonary hypertension, etc.    Patient was advised of need to wear oxygen at night upon discharge. He declines stating he would like to wait for formal evaluation via sleep study as an outpatient.    Chronic acquired lymphedema  Assessment & Plan  Chronic LLE lymphedema x 3 years after surgical resection of LLE cyst    Plan:  Advised compression and elevation of lower extremity for symptomatic edema relief    Acute respiratory failure with hypoxia (HCC)-resolved as of 7/6/2024  Assessment & Plan  Patient O2 saturation on admission was 83% on RA  Was placed on 3/L Nasal canula FiO2 32%  CTA showed right lower lobe subsegmental PE  Was placed on heparin    Plan:  Supplemental oxygen as needed  Continue heparin for PE  Incentive Spirometry           VTE Pharmacologic Prophylaxis: VTE Score: 7 High Risk (Score >/= 5) - Pharmacological DVT Prophylaxis Ordered: heparin drip. Sequential Compression Devices Ordered.    Mobility:   Basic Mobility Inpatient Raw Score: 24  JH-HLM Goal: 8: Walk 250 feet or more  JH-HLM Achieved: 8: Walk 250 feet ot more  JH-HLM Goal achieved. Continue to encourage appropriate mobility.    Patient Centered Rounds: I performed bedside rounds with nursing staff today.   Discussions with Specialists or Other Care Team Provider: cardiology, nephrology    Education and Discussions with Family / Patient: Patient declined call to contact  person.     Current Length of Stay: 9 day(s)  Current Patient Status: Inpatient   Discharge Plan: Anticipate discharge in 48-72 hrs to home.    Code Status: Level 1 - Full Code    Subjective:  Patient was seen and evaluated at bedside.  Overnight patient reported feeling nauseous and had 1 episode of emesis. He was ordered Zofran which he declined as his symptoms resolved after vomiting.  Denies any other episodes of N/V or abdominal pain. Patient's renal biopsy was not performed yesterday due to hypertension despite sedation and antihypertensive medications.  Patient was informed of plan to have renal biopsy done on Monday or Tuesday under general anesthesia.  Patient is frustrated but understands and agrees with plan.  Patient also a hard bulge to his left lower abdomen.  Area was examined and was explained to him that it appears to be lymphedema.    Objective:     Vitals:   Temp (24hrs), Av.1 °F (36.7 °C), Min:97.9 °F (36.6 °C), Max:98.5 °F (36.9 °C)    Temp:  [97.9 °F (36.6 °C)-98.5 °F (36.9 °C)] 98 °F (36.7 °C)  HR:  [51-63] 62  Resp:  [10-18] 18  BP: (112-163)/() 135/60  SpO2:  [92 %-97 %] 93 %  Body mass index is 59.27 kg/m².     Input and Output Summary (last 24 hours):     Intake/Output Summary (Last 24 hours) at 2024 1350  Last data filed at 2024 1218  Gross per 24 hour   Intake 830 ml   Output 1200 ml   Net -370 ml       Physical Exam:   Physical Exam  Vitals and nursing note reviewed.   Constitutional:       General: He is not in acute distress.     Appearance: He is obese. He is not ill-appearing.   HENT:      Head: Normocephalic and atraumatic.      Right Ear: External ear normal.      Left Ear: External ear normal.      Nose: Nose normal.      Mouth/Throat:      Mouth: Mucous membranes are moist.      Pharynx: Oropharynx is clear.   Eyes:      Extraocular Movements: Extraocular movements intact.      Conjunctiva/sclera: Conjunctivae normal.   Cardiovascular:      Rate and Rhythm:  Normal rate and regular rhythm.      Pulses: Normal pulses.      Heart sounds: Normal heart sounds. No murmur heard.     No gallop.   Pulmonary:      Effort: Pulmonary effort is normal. No respiratory distress.      Breath sounds: Normal breath sounds. No wheezing.      Comments: Tolerating room air  Abdominal:      General: Bowel sounds are normal. There is no distension.      Palpations: Abdomen is soft.      Tenderness: There is no abdominal tenderness.      Comments: Lymphedema noted to the left lower pannus  Skin under pannus appears irritated with some areas of maceration.  There is also a small draining wound present.   Musculoskeletal:         General: Normal range of motion.      Cervical back: Normal range of motion and neck supple. No rigidity.      Right lower leg: Edema (violaceous skin changes right lower extremity with healing punctate wounds over shin) present.      Left lower leg: Edema (lymphedema) present.      Comments: LLE significantly larger than RLE due to chronic lymphedema. Healed surgical incision noted to left calf.    Skin:     General: Skin is warm and dry.      Comments: R IJV PermCath site clear. Dressing intact.   Neurological:      General: No focal deficit present.      Mental Status: He is alert and oriented to person, place, and time. Mental status is at baseline.   Psychiatric:         Mood and Affect: Mood normal.          Additional Data:     Labs:  Results from last 7 days   Lab Units 07/13/24  0745 07/12/24  0549   WBC Thousand/uL 5.99 6.08   HEMOGLOBIN g/dL 9.4* 8.1*   HEMATOCRIT % 30.0* 25.9*   PLATELETS Thousands/uL 261 239   SEGS PCT %  --  65   LYMPHO PCT %  --  16   MONO PCT %  --  10   EOS PCT %  --  7*     Results from last 7 days   Lab Units 07/13/24  0745   SODIUM mmol/L 132*   POTASSIUM mmol/L 4.4   CHLORIDE mmol/L 95*   CO2 mmol/L 27   BUN mg/dL 27*   CREATININE mg/dL 7.33*   ANION GAP mmol/L 10   CALCIUM mg/dL 9.3   GLUCOSE RANDOM mg/dL 82     Results from last  7 days   Lab Units 07/12/24  1623   INR  1.06       Results from last 7 days   Lab Units 07/13/24  1145 07/13/24  0808 07/12/24  2157 07/12/24  1521 07/12/24  1123 07/12/24  0742 07/11/24  2054 07/11/24  1620 07/11/24  0719 07/10/24  1119 07/10/24  0730 07/09/24  2138   POC GLUCOSE mg/dl 103 95 133 82 81 86 113 113 86 90 79 94               Lines/Drains:  Invasive Devices       Peripheral Intravenous Line  Duration             Peripheral IV 07/09/24 Right;Lower Arm 4 days    Peripheral IV 07/10/24 Left Antecubital 2 days              Hemodialysis Catheter  Duration             HD Permanent Double Catheter 28 days                    Imaging: Reviewed radiology reports from this admission including: chest xray, abdominal/pelvic CT, and ECHO    Recent Cultures (last 7 days):         Last 24 Hours Medication List:   Current Facility-Administered Medications   Medication Dose Route Frequency Provider Last Rate    acetaminophen  650 mg Oral Q6H PRN Mily Martin MD      amLODIPine  10 mg Oral Daily Andrea Longoria MD      atorvastatin  80 mg Oral Daily Mily Martin MD      carvedilol  6.25 mg Oral BID With Meals Andrea Longoria MD      ferrous sulfate  325 mg Oral BID With Meals Mily Martin MD      heparin (porcine)  3-30 Units/kg/hr (Order-Specific) Intravenous Titrated Lorri Maria MD 27 Units/kg/hr (07/13/24 1218)    heparin (porcine)  10,000 Units Intravenous Q6H PRN Monique Rothman DO      heparin (porcine)  5,000 Units Intravenous Q6H PRN Monique Rothman DO      iron sucrose  100 mg Intravenous After Dialysis Johnny Viramontes MD Stopped (07/12/24 1134)    isosorbide mononitrate  60 mg Oral Daily Mily Martin MD      labetalol  10 mg Intravenous Q6H PRN Liana Riley MD      lidocaine  1 patch Topical Daily Mily Martin MD      nystatin   Topical BID Lorri Maria MD      ondansetron  4 mg Intravenous Once Cindy Balderas MD      oxyCODONE  10 mg Oral Q4H PRN Lorri  MD Crow      oxyCODONE  5 mg Oral Q4H PRN Lorri Maria MD      polyethylene glycol  17 g Oral Daily PRN Diane Oliveira MD      sevelamer  800 mg Oral TID With Meals Mily Martin MD      torsemide  100 mg Oral Daily Charles Spear MD          Today, Patient Was Seen By: Deya Palomino MD    **Please Note: This note may have been constructed using a voice recognition system.**

## 2024-07-13 NOTE — PROGRESS NOTES
NEPHROLOGY PROGRESS NOTE   Joaquin Frankel 44 y.o. male MRN: [id number]  Unit/Bed#: S -01 Encounter: [id number]    ASSESSMENT & PLAN:  44-year-old male presented with complaint of chest pain.  Nephrology was consulted for acute kidney injury on HD.  Recent hospital admission at [facility] between Tori 3 Tori 26 during that hospital admission he had a admission creatinine of 6.4 and was started on intermittent HD on 6/7/24 and has been on dialysis dependent since then.  He now presented with complaint of chest pain and CTA on admission revealed PE.    Acute kidney injury on HD, POA  -History of acute kidney injury previous hospital admission in June 2024 and on HD since 6/7/2024.  Current outpatient dialysis clinic [facility] Monday Wednesday Friday.  -Access right IJ PermCath  -Serum creatinine was 1.22 mg/dL in February 2024, increased to 2.98 in April 2024 and then increased to 6.4 in June 2024.  Serological workup was negative except for IgG kappa on SPEP and UPEP.  Cryoglobulin negative, ANCA negative anti-double-stranded DNA negative anti-GBM negative JOSE ROBERTO negative, C3-C4 at normal range.  Bone marrow biopsy done on June 12, 2024 did not reveal a plasma cell neoplasm.  -Etiology: Exact etiology not clear.  Patient does admit to using NSAIDs which could be contributing to CLOVIS.  Also there is possibility of MGRS -plan for kidney biopsy  Plan:  Kidney biopsy was planned for last Friday but could not be done due to blood pressure elevated to systolic 150s which is too high as per IR attending and also due to heavy breathing.  IR recommended kidney biopsy under anesthesia which will be arranged by IR  Last hemodialysis treatment was on 7/12 with UF 3.5 L, volume status acceptable, no urgent indication for dialysis treatment today, next intermittent HD on Monday per schedule.  May need to adjust timing with IR if plan for kidney biopsy on Monday.  Will need to follow-up with IR regarding timing of  kidney biopsy.  Kidney biopsy is being done for possibility of MGR S    Chronic kidney disease stage III: Baseline creatinine 1.2 to 1.5 mg/dL.  No prior renal follow-up.  History of nephrotic range proteinuria in April 2024    Primary hypertension: Blood pressure currently stable and is at goal.  Continue current treatment.  Avoid hypotension.  Currently on amlodipine, Coreg, torsemide.  Not able to increase the dose of Coreg due to bradycardia.  Would add hydralazine if the blood pressure remains elevated    Hyponatremia likely due to fluid overload, sodium stable at 132 meq/L continue fluid restriction 1.5 L/day    Fluid overload: Continue UF with HD   -continue to challenge target weight.  Patient has left lower extremity lymphedema.  Right lower extremity edema has resolved  -Continue to challenge target weight    Anemia due to iron deficiency as well as chronic kidney disease and chronic illness  -no JOSSELYN due to acute PE.  Iron saturation 17%  -Status post PRBC, hemoglobin stable at 9.4 g/dL.  Order for IV iron 100 mg on 7/10 with each dialysis treatment for total 10 doses starting 7/10.  Continue IV iron with hemodialysis treatment    Nephrotic range proteinuria  -Albumin creatinine ratio was 5.2 g in April 2024, plan for kidney biopsy as mentioned above    IgG kappa monoclonal gammopathy, bone marrow biopsy on June 12, 2024 did not reveal plasma cell neoplasm.  Plan for kidney biopsy as mentioned above    CKD/MBD/hyperphosphatemia   -continue sevelamer.  Phosphorus is elevated at 5.8, recommend low phosphorus diet..  If phosphorus remains elevated may need to increase the dose of binders    Pulmonary embolism: On heparin drip.  Hold off on NOAC due to need for kidney biopsy    Left lower extremity lymphedema recommend limb elevation   continue UF with HD    Complaint of chest pain, history of CAD, cardiology on board,  repeat nuclear stress test without significant reversible ischemia.  As per cardiology note  most likely due to acute PE chronic anemia and hypertension causing chest pain     Discussed with primary team regarding next hemodialysis treatment on Monday and to discuss with IR regarding kidney biopsy and agreed with the plan      SUBJECTIVE:  No new complaints.  No chest pain or shortness of breath, no nausea vomiting.  Kidney biopsy was canceled on Friday due to elevated blood pressure and as per IR will need under anesthesia    OBJECTIVE:  Current Weight: Weight - Scale: (!) 172 kg (378 lb 6.4 oz)  Vitals:    07/13/24 0808   BP: 135/60   Pulse: 62   Resp: 18   Temp: 98 °F (36.7 °C)   SpO2: 93%       Intake/Output Summary (Last 24 hours) at 7/13/2024 1029  Last data filed at 7/13/2024 0901  Gross per 24 hour   Intake 1080 ml   Output 5450 ml   Net -4370 ml       Physical Exam  General:  Ill looking, awake.  Obese  Eyes: Conjunctivae pink,  Sclera anicteric  ENT: lips and mucous membranes moist  Neck: supple   Chest: Clear to Auscultation both lungs,  no crackles, ronchus or wheezing.  CVS: S1 & S2 present, normal rate, regular rhythm, no murmur.  Abdomen: soft, non-tender, non-distended, Bowel sounds normoactive  Extremities: Left leg lymphedema.  Trace edema right leg  Skin: no rash  Neuro: awake, alert, oriented  Psych: Mood and affect appropriate      Medications:    Current Facility-Administered Medications:     acetaminophen (TYLENOL) tablet 650 mg, 650 mg, Oral, Q6H PRN, Mily Martin MD, 650 mg at 07/11/24 1950    amLODIPine (NORVASC) tablet 10 mg, 10 mg, Oral, Daily, Andrea Longoria MD, 10 mg at 07/13/24 0912    atorvastatin (LIPITOR) tablet 80 mg, 80 mg, Oral, Daily, Mily Martin MD, 80 mg at 07/13/24 0911    carvedilol (COREG) tablet 6.25 mg, 6.25 mg, Oral, BID With Meals, Andrea Longoria MD, 6.25 mg at 07/13/24 0912    ferrous sulfate tablet 325 mg, 325 mg, Oral, BID With Meals, Mily Martin MD, 325 mg at 07/13/24 0916    heparin (porcine) 25,000 units in 0.45% NaCl 250 mL infusion (premix), 3-30  Units/kg/hr (Order-Specific), Intravenous, Titrated, Lorri Maria MD, Last Rate: 33.8 mL/hr at 07/13/24 0615, 27 Units/kg/hr at 07/13/24 0615    heparin (porcine) injection 10,000 Units, 10,000 Units, Intravenous, Q6H PRN, Monique Rothman DO    heparin (porcine) injection 5,000 Units, 5,000 Units, Intravenous, Q6H PRN, Monique Rothman DO, 5,000 Units at 07/10/24 1114    iron sucrose (VENOFER) 100 mg in sodium chloride 0.9 % 100 mL IVPB, 100 mg, Intravenous, After Dialysis, Johnny Viramontes MD, Stopped at 07/12/24 1134    isosorbide mononitrate (IMDUR) 24 hr tablet 60 mg, 60 mg, Oral, Daily, Mily Martin MD, 60 mg at 07/13/24 0911    labetalol (NORMODYNE) injection 10 mg, 10 mg, Intravenous, Q6H PRN, Liana Riley MD    lidocaine (LIDODERM) 5 % patch 1 patch, 1 patch, Topical, Daily, Mily Martin MD    nystatin (MYCOSTATIN) powder, , Topical, BID, Lorri Maria MD    ondansetron (ZOFRAN) injection 4 mg, 4 mg, Intravenous, Once, Cnidy Balderas MD    oxyCODONE (ROXICODONE) immediate release tablet 10 mg, 10 mg, Oral, Q4H PRN, Lorri Maria MD, 10 mg at 07/13/24 0615    oxyCODONE (ROXICODONE) IR tablet 5 mg, 5 mg, Oral, Q4H PRN, Lorri Maria MD, 5 mg at 07/13/24 0041    polyethylene glycol (MIRALAX) packet 17 g, 17 g, Oral, Daily PRN, Diane Oliveira MD    sevelamer (RENAGEL) tablet 800 mg, 800 mg, Oral, TID With Meals, Mily Martin MD, 800 mg at 07/13/24 0912    torsemide (DEMADEX) tablet 100 mg, 100 mg, Oral, Daily, Charles Spear MD, 100 mg at 07/13/24 0911    Invasive Devices:        Lab Results:   Results from last 7 days   Lab Units 07/13/24  0745 07/12/24  0549 07/11/24  0925 07/08/24 2021 07/08/24  0518   WBC Thousand/uL 5.99 6.08 6.48   < > 6.73   HEMOGLOBIN g/dL 9.4* 8.1* 9.1*   < > 7.8*   HEMATOCRIT % 30.0* 25.9* 28.9*   < > 25.0*   PLATELETS Thousands/uL 261 239 241   < > 267   POTASSIUM mmol/L 4.4 4.3 4.0   < > 4.5   CHLORIDE mmol/L 95* 95* 94*   < > 95*  "  CO2 mmol/L 27 27 28   < > 25   BUN mg/dL 27* 39* 32*   < > 51*   CREATININE mg/dL 7.33* 8.84* 8.13*   < > 11.13*   CALCIUM mg/dL 9.3 9.0 9.2   < > 8.9   PHOSPHORUS mg/dL  --   --   --   --  5.8*    < > = values in this interval not displayed.       Previous work up:         Portions of the record may have been created with voice recognition software. Occasional wrong word or \"sound a like\" substitutions may have occurred due to the inherent limitations of voice recognition software. Read the chart carefully and recognize, using context, where substitutions have occurred.If you have any questions, please contact the dictating provider.    "

## 2024-07-13 NOTE — PLAN OF CARE
Problem: Potential for Falls  Goal: Patient will remain free of falls  Description: INTERVENTIONS:  - Educate patient/family on patient safety including physical limitations  - Instruct patient to call for assistance with activity   - Consult OT/PT to assist with strengthening/mobility   - Keep Call bell within reach  - Keep bed low and locked with side rails adjusted as appropriate  - Keep care items and personal belongings within reach  - Initiate and maintain comfort rounds  - Make Fall Risk Sign visible to staff  - Apply yellow socks and bracelet for high fall risk patients  - Consider moving patient to room near nurses station  Outcome: Progressing     Problem: METABOLIC, FLUID AND ELECTROLYTES - ADULT  Goal: Electrolytes maintained within normal limits  Description: INTERVENTIONS:  - Monitor labs and assess patient for signs and symptoms of electrolyte imbalances  - Administer electrolyte replacement as ordered  - Monitor response to electrolyte replacements, including repeat lab results as appropriate  - Instruct patient on fluid and nutrition as appropriate  Outcome: Progressing  Goal: Fluid balance maintained  Description: INTERVENTIONS:  - Monitor labs   - Monitor I/O and WT  - Instruct patient on fluid and nutrition as appropriate  - Assess for signs & symptoms of volume excess or deficit  Outcome: Progressing     Problem: Prexisting or High Potential for Compromised Skin Integrity  Goal: Skin integrity is maintained or improved  Description: INTERVENTIONS:  - Identify patients at risk for skin breakdown  - Assess and monitor skin integrity  - Assess and monitor nutrition and hydration status  - Monitor labs   - Assess for incontinence   - Turn and reposition patient  - Assist with mobility/ambulation  - Relieve pressure over bony prominences  - Avoid friction and shearing  - Provide appropriate hygiene as needed including keeping skin clean and dry  - Evaluate need for skin moisturizer/barrier cream  -  Collaborate with interdisciplinary team   - Patient/family teaching  - Consider wound care consult   Outcome: Progressing     Problem: Nutrition/Hydration-ADULT  Goal: Nutrient/Hydration intake appropriate for improving, restoring or maintaining nutritional needs  Description: Monitor and assess patient's nutrition/hydration status for malnutrition. Collaborate with interdisciplinary team and initiate plan and interventions as ordered.  Monitor patient's weight and dietary intake as ordered or per policy. Utilize nutrition screening tool and intervene as necessary. Determine patient's food preferences and provide high-protein, high-caloric foods as appropriate.     INTERVENTIONS:  - Monitor oral intake, urinary output, labs, and treatment plans  - Assess nutrition and hydration status and recommend course of action  - Evaluate amount of meals eaten  - Assist patient with eating if necessary   - Allow adequate time for meals  - Recommend/ encourage appropriate diets, oral nutritional supplements, and vitamin/mineral supplements  - Order, calculate, and assess calorie counts as needed  - Recommend, monitor, and adjust tube feedings and TPN/PPN based on assessed needs  - Assess need for intravenous fluids  - Provide specific nutrition/hydration education as appropriate  - Include patient/family/caregiver in decisions related to nutrition  Outcome: Progressing     Problem: PAIN - ADULT  Goal: Verbalizes/displays adequate comfort level or baseline comfort level  Description: Interventions:  - Encourage patient to monitor pain and request assistance  - Assess pain using appropriate pain scale  - Administer analgesics based on type and severity of pain and evaluate response  - Implement non-pharmacological measures as appropriate and evaluate response  - Consider cultural and social influences on pain and pain management  - Notify physician/advanced practitioner if interventions unsuccessful or patient reports new  pain  Outcome: Progressing     Problem: INFECTION - ADULT  Goal: Absence or prevention of progression during hospitalization  Description: INTERVENTIONS:  - Assess and monitor for signs and symptoms of infection  - Monitor lab/diagnostic results  - Monitor all insertion sites, i.e. indwelling lines, tubes, and drains  - Monitor endotracheal if appropriate and nasal secretions for changes in amount and color  - [place] appropriate cooling/warming therapies per order  - Administer medications as ordered  - Instruct and encourage patient and family to use good hand hygiene technique  - Identify and instruct in appropriate isolation precautions for identified infection/condition  Outcome: Progressing  Goal: Absence of fever/infection during neutropenic period  Description: INTERVENTIONS:  - Monitor WBC    Outcome: Progressing     Problem: SAFETY ADULT  Goal: Patient will remain free of falls  Description: INTERVENTIONS:  - Educate patient/family on patient safety including physical limitations  - Instruct patient to call for assistance with activity   - Consult OT/PT to assist with strengthening/mobility   - Keep Call bell within reach  - Keep bed low and locked with side rails adjusted as appropriate  - Keep care items and personal belongings within reach  - Initiate and maintain comfort rounds  - Make Fall Risk Sign visible to staff  - Apply yellow socks and bracelet for high fall risk patients  - Consider moving patient to room near nurses station  Outcome: Progressing  Goal: Maintain or return to baseline ADL function  Description: INTERVENTIONS:  -  Assess patient's ability to carry out ADLs; assess patient's baseline for ADL function and identify physical deficits which impact ability to perform ADLs (bathing, care of mouth/teeth, toileting, grooming, dressing, etc.)  - Assess/evaluate cause of self-care deficits   - Assess range of motion  - Assess patient's mobility; develop plan if impaired  - Assess  patient's need for assistive devices and provide as appropriate  - Encourage maximum independence but intervene and supervise when necessary  - Involve family in performance of ADLs  - Assess for home care needs following discharge   - Consider OT consult to assist with ADL evaluation and planning for discharge  - Provide patient education as appropriate  Outcome: Progressing  Goal: Maintains/Returns to pre admission functional level  Description: INTERVENTIONS:  - Perform AM-PAC 6 Click Basic Mobility/ Daily Activity assessment daily.  - Set and communicate daily mobility goal to care team and patient/family/caregiver.   - Collaborate with rehabilitation services on mobility goals if consulted  - Out of bed for toileting  - Record patient progress and toleration of activity level   Outcome: Progressing     Problem: DISCHARGE PLANNING  Goal: Discharge to home or other facility with appropriate resources  Description: INTERVENTIONS:  - Identify barriers to discharge w/patient and caregiver  - Arrange for needed discharge resources and transportation as appropriate  - Identify discharge learning needs (meds, wound care, etc.)  - Arrange for interpretive services to assist at discharge as needed  - Refer to Case Management Department for coordinating discharge planning if the patient needs post-hospital services based on physician/advanced practitioner order or complex needs related to functional status, cognitive ability, or social support system  Outcome: Progressing     Problem: Knowledge Deficit  Goal: Patient/family/caregiver demonstrates understanding of disease process, treatment plan, medications, and discharge instructions  Description: Complete learning assessment and assess knowledge base.  Interventions:  - Provide teaching at level of understanding  - Provide teaching via preferred learning methods  Outcome: Progressing

## 2024-07-14 LAB
APTT PPP: 74 SECONDS (ref 23–37)
GLUCOSE SERPL-MCNC: 104 MG/DL (ref 65–140)
GLUCOSE SERPL-MCNC: 112 MG/DL (ref 65–140)
GLUCOSE SERPL-MCNC: 129 MG/DL (ref 65–140)
GLUCOSE SERPL-MCNC: 89 MG/DL (ref 65–140)

## 2024-07-14 PROCEDURE — 99232 SBSQ HOSP IP/OBS MODERATE 35: CPT | Performed by: INTERNAL MEDICINE

## 2024-07-14 PROCEDURE — 85730 THROMBOPLASTIN TIME PARTIAL: CPT | Performed by: INTERNAL MEDICINE

## 2024-07-14 PROCEDURE — 82948 REAGENT STRIP/BLOOD GLUCOSE: CPT

## 2024-07-14 RX ADMIN — OXYCODONE HYDROCHLORIDE 5 MG: 5 TABLET ORAL at 10:39

## 2024-07-14 RX ADMIN — OXYCODONE HYDROCHLORIDE 5 MG: 5 TABLET ORAL at 19:21

## 2024-07-14 RX ADMIN — OXYCODONE HYDROCHLORIDE 10 MG: 10 TABLET ORAL at 21:18

## 2024-07-14 RX ADMIN — CARVEDILOL 6.25 MG: 6.25 TABLET, FILM COATED ORAL at 08:17

## 2024-07-14 RX ADMIN — NYSTATIN: 100000 POWDER TOPICAL at 16:10

## 2024-07-14 RX ADMIN — CARVEDILOL 6.25 MG: 6.25 TABLET, FILM COATED ORAL at 16:10

## 2024-07-14 RX ADMIN — OXYCODONE HYDROCHLORIDE 10 MG: 10 TABLET ORAL at 12:30

## 2024-07-14 RX ADMIN — SEVELAMER HYDROCHLORIDE 800 MG: 800 TABLET ORAL at 10:39

## 2024-07-14 RX ADMIN — OXYCODONE HYDROCHLORIDE 5 MG: 5 TABLET ORAL at 01:38

## 2024-07-14 RX ADMIN — OXYCODONE HYDROCHLORIDE 5 MG: 5 TABLET ORAL at 23:56

## 2024-07-14 RX ADMIN — ISOSORBIDE MONONITRATE 60 MG: 60 TABLET, EXTENDED RELEASE ORAL at 08:17

## 2024-07-14 RX ADMIN — OXYCODONE HYDROCHLORIDE 10 MG: 10 TABLET ORAL at 03:21

## 2024-07-14 RX ADMIN — FERROUS SULFATE TAB 325 MG (65 MG ELEMENTAL FE) 325 MG: 325 (65 FE) TAB at 08:17

## 2024-07-14 RX ADMIN — SEVELAMER HYDROCHLORIDE 800 MG: 800 TABLET ORAL at 08:17

## 2024-07-14 RX ADMIN — TORSEMIDE 100 MG: 100 TABLET ORAL at 08:17

## 2024-07-14 RX ADMIN — HEPARIN SODIUM 27 UNITS/KG/HR: 10000 INJECTION, SOLUTION INTRAVENOUS at 12:27

## 2024-07-14 RX ADMIN — SEVELAMER HYDROCHLORIDE 800 MG: 800 TABLET ORAL at 16:10

## 2024-07-14 RX ADMIN — AMLODIPINE BESYLATE 10 MG: 10 TABLET ORAL at 08:17

## 2024-07-14 RX ADMIN — OXYCODONE HYDROCHLORIDE 10 MG: 10 TABLET ORAL at 16:10

## 2024-07-14 RX ADMIN — FERROUS SULFATE TAB 325 MG (65 MG ELEMENTAL FE) 325 MG: 325 (65 FE) TAB at 16:10

## 2024-07-14 RX ADMIN — OXYCODONE HYDROCHLORIDE 10 MG: 10 TABLET ORAL at 08:17

## 2024-07-14 RX ADMIN — HEPARIN SODIUM 27 UNITS/KG/HR: 10000 INJECTION, SOLUTION INTRAVENOUS at 18:48

## 2024-07-14 RX ADMIN — ATORVASTATIN CALCIUM 80 MG: 40 TABLET, FILM COATED ORAL at 08:17

## 2024-07-14 RX ADMIN — HEPARIN SODIUM 27 UNITS/KG/HR: 10000 INJECTION, SOLUTION INTRAVENOUS at 04:19

## 2024-07-14 NOTE — PLAN OF CARE
Problem: Potential for Falls  Goal: Patient will remain free of falls  Description: INTERVENTIONS:  - Educate patient/family on patient safety including physical limitations  - Instruct patient to call for assistance with activity   - Consult OT/PT to assist with strengthening/mobility   - Keep Call bell within reach  - Keep bed low and locked with side rails adjusted as appropriate  - Keep care items and personal belongings within reach  - Initiate and maintain comfort rounds  - Make Fall Risk Sign visible to staff  - Offer Toileting every 2 Hours, in advance of need  - Initiate/Maintain alarm  - Obtain necessary fall risk management equipment:   - Apply yellow socks and bracelet for high fall risk patients  - Consider moving patient to room near nurses station  Outcome: Progressing     Problem: METABOLIC, FLUID AND ELECTROLYTES - ADULT  Goal: Electrolytes maintained within normal limits  Description: INTERVENTIONS:  - Monitor labs and assess patient for signs and symptoms of electrolyte imbalances  - Administer electrolyte replacement as ordered  - Monitor response to electrolyte replacements, including repeat lab results as appropriate  - Instruct patient on fluid and nutrition as appropriate  Outcome: Progressing  Goal: Fluid balance maintained  Description: INTERVENTIONS:  - Monitor labs   - Monitor I/O and WT  - Instruct patient on fluid and nutrition as appropriate  - Assess for signs & symptoms of volume excess or deficit  Outcome: Progressing     Problem: Prexisting or High Potential for Compromised Skin Integrity  Goal: Skin integrity is maintained or improved  Description: INTERVENTIONS:  - Identify patients at risk for skin breakdown  - Assess and monitor skin integrity  - Assess and monitor nutrition and hydration status  - Monitor labs   - Assess for incontinence   - Turn and reposition patient  - Assist with mobility/ambulation  - Relieve pressure over bony prominences  - Avoid friction and  shearing  - Provide appropriate hygiene as needed including keeping skin clean and dry  - Evaluate need for skin moisturizer/barrier cream  - Collaborate with interdisciplinary team   - Patient/family teaching  - Consider wound care consult   Outcome: Progressing     Problem: Nutrition/Hydration-ADULT  Goal: Nutrient/Hydration intake appropriate for improving, restoring or maintaining nutritional needs  Description: Monitor and assess patient's nutrition/hydration status for malnutrition. Collaborate with interdisciplinary team and initiate plan and interventions as ordered.  Monitor patient's weight and dietary intake as ordered or per policy. Utilize nutrition screening tool and intervene as necessary. Determine patient's food preferences and provide high-protein, high-caloric foods as appropriate.     INTERVENTIONS:  - Monitor oral intake, urinary output, labs, and treatment plans  - Assess nutrition and hydration status and recommend course of action  - Evaluate amount of meals eaten  - Assist patient with eating if necessary   - Allow adequate time for meals  - Recommend/ encourage appropriate diets, oral nutritional supplements, and vitamin/mineral supplements  - Order, calculate, and assess calorie counts as needed  - Recommend, monitor, and adjust tube feedings and TPN/PPN based on assessed needs  - Assess need for intravenous fluids  - Provide specific nutrition/hydration education as appropriate  - Include patient/family/caregiver in decisions related to nutrition  Outcome: Progressing     Problem: PAIN - ADULT  Goal: Verbalizes/displays adequate comfort level or baseline comfort level  Description: Interventions:  - Encourage patient to monitor pain and request assistance  - Assess pain using appropriate pain scale  - Administer analgesics based on type and severity of pain and evaluate response  - Implement non-pharmacological measures as appropriate and evaluate response  - Consider cultural and social  influences on pain and pain management  - Notify physician/advanced practitioner if interventions unsuccessful or patient reports new pain  Outcome: Progressing     Problem: INFECTION - ADULT  Goal: Absence or prevention of progression during hospitalization  Description: INTERVENTIONS:  - Assess and monitor for signs and symptoms of infection  - Monitor lab/diagnostic results  - Monitor all insertion sites, i.e. indwelling lines, tubes, and drains  - Monitor endotracheal if appropriate and nasal secretions for changes in amount and color  - [place] appropriate cooling/warming therapies per order  - Administer medications as ordered  - Instruct and encourage patient and family to use good hand hygiene technique  - Identify and instruct in appropriate isolation precautions for identified infection/condition  Outcome: Progressing  Goal: Absence of fever/infection during neutropenic period  Description: INTERVENTIONS:  - Monitor WBC    Outcome: Progressing     Problem: SAFETY ADULT  Goal: Patient will remain free of falls  Description: INTERVENTIONS:  - Educate patient/family on patient safety including physical limitations  - Instruct patient to call for assistance with activity   - Consult OT/PT to assist with strengthening/mobility   - Keep Call bell within reach  - Keep bed low and locked with side rails adjusted as appropriate  - Keep care items and personal belongings within reach  - Initiate and maintain comfort rounds  - Make Fall Risk Sign visible to staff  - Offer Toileting every 2 Hours, in advance of need  - Initiate/Maintain alarm  - Obtain necessary fall risk management equipment:   - Apply yellow socks and bracelet for high fall risk patients  - Consider moving patient to room near nurses station  Outcome: Progressing  Goal: Maintain or return to baseline ADL function  Description: INTERVENTIONS:  -  Assess patient's ability to carry out ADLs; assess patient's baseline for ADL function and identify  physical deficits which impact ability to perform ADLs (bathing, care of mouth/teeth, toileting, grooming, dressing, etc.)  - Assess/evaluate cause of self-care deficits   - Assess range of motion  - Assess patient's mobility; develop plan if impaired  - Assess patient's need for assistive devices and provide as appropriate  - Encourage maximum independence but intervene and supervise when necessary  - Involve family in performance of ADLs  - Assess for home care needs following discharge   - Consider OT consult to assist with ADL evaluation and planning for discharge  - Provide patient education as appropriate  Outcome: Progressing  Goal: Maintains/Returns to pre admission functional level  Description: INTERVENTIONS:  - Perform AM-PAC 6 Click Basic Mobility/ Daily Activity assessment daily.  - Set and communicate daily mobility goal to care team and patient/family/caregiver.   - Collaborate with rehabilitation services on mobility goals if consulted  - Perform Range of Motion 2 times a day.  - Reposition patient every 2 hours.  - Dangle patient 2 times a day  - Stand patient 2 times a day  - Ambulate patient 2 times a day  - Out of bed to chair 2 times a day   - Out of bed for meals 2 times a day  - Out of bed for toileting  - Record patient progress and toleration of activity level   Outcome: Progressing     Problem: DISCHARGE PLANNING  Goal: Discharge to home or other facility with appropriate resources  Description: INTERVENTIONS:  - Identify barriers to discharge w/patient and caregiver  - Arrange for needed discharge resources and transportation as appropriate  - Identify discharge learning needs (meds, wound care, etc.)  - Arrange for interpretive services to assist at discharge as needed  - Refer to Case Management Department for coordinating discharge planning if the patient needs post-hospital services based on physician/advanced practitioner order or complex needs related to functional status, cognitive  ability, or social support system  Outcome: Progressing     Problem: Knowledge Deficit  Goal: Patient/family/caregiver demonstrates understanding of disease process, treatment plan, medications, and discharge instructions  Description: Complete learning assessment and assess knowledge base.  Interventions:  - Provide teaching at level of understanding  - Provide teaching via preferred learning methods  Outcome: Progressing

## 2024-07-14 NOTE — PROGRESS NOTES
NEPHROLOGY PROGRESS NOTE   Joaquin Frankel 44 y.o. male MRN: [id number]  Unit/Bed#: S -01 Encounter: [id number]    ASSESSMENT & PLAN:  44-year-old male presented with complaint of chest pain.  Nephrology was consulted for acute kidney injury on HD.  Recent hospital admission at [facility] between Tori 3 Tori 26 during that hospital admission he had a admission creatinine of 6.4 and was started on intermittent HD on 6/7/24 and has been on dialysis dependent since then.  He now presented with complaint of chest pain and CTA on admission revealed PE.    Acute kidney injury on HD, POA  -History of acute kidney injury previous hospital admission in June 2024 and on HD since 6/7/2024.  Current outpatient dialysis [facility] Monday Wednesday Friday.  -Access right IJ PermCath  -Serum creatinine was 1.22 mg/dL in February 2024, increased to 2.98 in April 2024 and then increased to 6.4 in June 2024.  Serological workup was negative except for IgG kappa on SPEP and UPEP.  Cryoglobulin negative, ANCA negative anti-double-stranded DNA negative anti-GBM negative JOSE ROBERTO negative, C3-C4 at normal range.  Bone marrow biopsy done on June 12, 2024 did not reveal a plasma cell neoplasm.  -Etiology: Exact etiology not clear.  Patient does admit to using NSAIDs which could be contributing to CLOVIS.  Also there is possibility of MGRS -plan for kidney biopsy  Plan:  Kidney biopsy was planned for last Friday but could not be done due to blood pressure elevated to systolic 150s which is too high as per IR attending and also due to heavy breathing.  IR recommended kidney biopsy under anesthesia which will be arranged by IR- Monday or Tuesday.  Will need to check with IR tomorrow  Last hemodialysis treatment was on 7/12 with UF 3.5 L, volume status acceptable, no urgent indication for dialysis treatment today, next intermittent HD on Monday.    Kidney biopsy is being done for possibility of MGRS    Chronic kidney disease  stage III: Baseline creatinine 1.2 to 1.5 mg/dL.  No prior renal follow-up.  History of nephrotic range proteinuria in April 2024    Primary hypertension: Blood pressure currently stable and is at goal.   Continue current treatment avoid hypotension.  Currently on amlodipine, Coreg, torsemide.  Not able to increase the dose of Coreg due to bradycardia.  Would add hydralazine if the blood pressure remains elevated    Hyponatremia likely due to fluid overload, sodium stable at 132 meq/L continue fluid restriction 1.5 L/day    Fluid overload: Continue UF with HD   -continue to challenge target weight.  Patient has left lower extremity lymphedema.  Right lower extremity edema has resolved  -Continue to challenge target weight.     Anemia due to iron deficiency as well as chronic kidney disease and chronic illness  -no JOSSELYN due to acute PE.  Iron saturation 17%  -Status post PRBC, hemoglobin stable at 9.4 g/dL.  Order for IV iron 100 mg on 7/10 with each dialysis treatment for total 10 doses starting 7/10.  Continue IV iron with hemodialysis treatment    Nephrotic range proteinuria  -Albumin creatinine ratio was 5.2 g in April 2024, plan for kidney biopsy as mentioned above    IgG kappa monoclonal gammopathy, bone marrow biopsy on June 12, 2024 did not reveal plasma cell neoplasm.  Plan for kidney biopsy as mentioned above    CKD/MBD/hyperphosphatemia   -continue sevelamer.  Phosphorus is elevated at 5.8, recommend low phosphorus diet..  If phosphorus remains elevated may need to increase the dose of binders    Pulmonary embolism: On heparin drip.  Hold off on NOAC due to need for kidney biopsy    Left lower extremity lymphedema recommend limb elevation   continue UF with HD    Complaint of chest pain, history of CAD, cardiology on board,  repeat nuclear stress test without significant reversible ischemia.  As per cardiology note most likely due to acute PE chronic anemia and hypertension causing chest pain     Discussed  with primary team regarding plan for next dialysis treatment on Monday and possible kidney biopsy Monday or Tuesday and agreed with the plan      SUBJECTIVE:  No new complaints.  No chest pain or shortness of breath, no nausea vomiting.  Lower extremity edema improving    OBJECTIVE:  Current Weight: Weight - Scale: (!) 174 kg (383 lb)  Vitals:    07/14/24 0747   BP: 137/59   Pulse: 61   Resp:    Temp: 98.9 °F (37.2 °C)   SpO2: 91%       Intake/Output Summary (Last 24 hours) at 7/14/2024 0906  Last data filed at 7/14/2024 0351  Gross per 24 hour   Intake 1170 ml   Output 1000 ml   Net 170 ml       Physical Exam  General:  Ill looking, awake. Obese   Eyes: Conjunctivae pink,  Sclera anicteric  ENT: lips and mucous membranes moist  Neck: supple   Chest: Clear to Auscultation both lungs,  no crackles, ronchus or wheezing.  CVS: S1 & S2 present, normal rate, regular rhythm, no murmur.  Abdomen: soft, non-tender, non-distended, Bowel sounds normoactive  Extremities: Trace edema right lower extremity, left leg lymphedema  Skin: no rash  Neuro: awake, alert, oriented x 3   Psych: Mood and affect appropriate    Medications:    Current Facility-Administered Medications:     acetaminophen (TYLENOL) tablet 650 mg, 650 mg, Oral, Q6H PRN, Mily Martin MD, 650 mg at 07/11/24 1950    amLODIPine (NORVASC) tablet 10 mg, 10 mg, Oral, Daily, Andrea Longoria MD, 10 mg at 07/14/24 0817    atorvastatin (LIPITOR) tablet 80 mg, 80 mg, Oral, Daily, Mily Martin MD, 80 mg at 07/14/24 0817    carvedilol (COREG) tablet 6.25 mg, 6.25 mg, Oral, BID With Meals, Andrea Longoria MD, 6.25 mg at 07/14/24 0817    ferrous sulfate tablet 325 mg, 325 mg, Oral, BID With Meals, Mily Martin MD, 325 mg at 07/14/24 0817    heparin (porcine) 25,000 units in 0.45% NaCl 250 mL infusion (premix), 3-30 Units/kg/hr (Order-Specific), Intravenous, Titrated, Lorri Maria MD, Last Rate: 33.8 mL/hr at 07/14/24 0419, 27 Units/kg/hr at 07/14/24 0419    heparin (porcine)  injection 10,000 Units, 10,000 Units, Intravenous, Q6H PRN, Monique Rothman DO    heparin (porcine) injection 5,000 Units, 5,000 Units, Intravenous, Q6H PRN, Monique Rothman DO, 5,000 Units at 07/10/24 1114    iron sucrose (VENOFER) 100 mg in sodium chloride 0.9 % 100 mL IVPB, 100 mg, Intravenous, After Dialysis, Johnny Viramontes MD, Stopped at 07/12/24 1134    isosorbide mononitrate (IMDUR) 24 hr tablet 60 mg, 60 mg, Oral, Daily, Mily Martin MD, 60 mg at 07/14/24 0817    labetalol (NORMODYNE) injection 10 mg, 10 mg, Intravenous, Q6H PRN, Liana Riley MD    lidocaine (LIDODERM) 5 % patch 1 patch, 1 patch, Topical, Daily, Mily Martin MD    nystatin (MYCOSTATIN) powder, , Topical, BID, Lorri Maria MD, Given at 07/13/24 1707    ondansetron (ZOFRAN) injection 4 mg, 4 mg, Intravenous, Once, Cindy Balderas MD    oxyCODONE (ROXICODONE) immediate release tablet 10 mg, 10 mg, Oral, Q4H PRN, Lorri Maria MD, 10 mg at 07/14/24 0817    oxyCODONE (ROXICODONE) IR tablet 5 mg, 5 mg, Oral, Q4H PRN, Lorri Maria MD, 5 mg at 07/14/24 0138    polyethylene glycol (MIRALAX) packet 17 g, 17 g, Oral, Daily PRN, Diane Oliveira MD    sevelamer (RENAGEL) tablet 800 mg, 800 mg, Oral, TID With Meals, Mily Martin MD, 800 mg at 07/14/24 0817    torsemide (DEMADEX) tablet 100 mg, 100 mg, Oral, Daily, Charles Spear MD, 100 mg at 07/14/24 0817    Invasive Devices:        Lab Results:   Results from last 7 days   Lab Units 07/13/24  0745 07/12/24  0549 07/11/24  0925 07/08/24 2021 07/08/24  0518   WBC Thousand/uL 5.99 6.08 6.48   < > 6.73   HEMOGLOBIN g/dL 9.4* 8.1* 9.1*   < > 7.8*   HEMATOCRIT % 30.0* 25.9* 28.9*   < > 25.0*   PLATELETS Thousands/uL 261 239 241   < > 267   POTASSIUM mmol/L 4.4 4.3 4.0   < > 4.5   CHLORIDE mmol/L 95* 95* 94*   < > 95*   CO2 mmol/L 27 27 28   < > 25   BUN mg/dL 27* 39* 32*   < > 51*   CREATININE mg/dL 7.33* 8.84* 8.13*   < > 11.13*   CALCIUM mg/dL 9.3 9.0  "9.2   < > 8.9   PHOSPHORUS mg/dL  --   --   --   --  5.8*    < > = values in this interval not displayed.       Previous work up:         Portions of the record may have been created with voice recognition software. Occasional wrong word or \"sound a like\" substitutions may have occurred due to the inherent limitations of voice recognition software. Read the chart carefully and recognize, using context, where substitutions have occurred.If you have any questions, please contact the dictating provider.    "

## 2024-07-14 NOTE — PROCEDURES
Venous Access Line Insertion    Date/Time: 7/14/2024 4:03 PM    Performed by: Liliya Haque RN  Authorized by: Job Gutierrez MD    Patient location:  Bedside  Other Assisting Provider: No    Pre-procedure details:     Hand hygiene: Hand hygiene performed prior to insertion      Sterile barrier technique: All elements of maximal sterile technique followed      Skin preparation:  2% chlorhexidine    Skin preparation agent: Skin preparation agent completely dried prior to procedure    Procedure details:     Complex Venous Access Line Type: US Guided Peripheral IV      Orientation:  Left and medial    Location:  Forearm    Catheter size:  20 gauge (1.75 inch length catheter)    Patient evaluated for contraindications to access (i.e. fistula, thrombosis, etc): No      Approach: percutaneous technique used      Patient position:  Flat    Sterile ultrasound techniques: Sterile gel and sterile probe covers were used      Number of attempts:  1    Successful placement: yes      Landmarks identified: yes    Anesthesia (see MAR for exact dosages):     Anesthesia method:  None  Post-procedure details:     Post-procedure:  Dressing applied    Assessment:  Blood return through all ports and free fluid flow

## 2024-07-15 ENCOUNTER — PATIENT OUTREACH (OUTPATIENT)
Dept: FAMILY MEDICINE CLINIC | Facility: CLINIC | Age: 44
End: 2024-07-15

## 2024-07-15 ENCOUNTER — APPOINTMENT (INPATIENT)
Dept: RADIOLOGY | Facility: HOSPITAL | Age: 44
DRG: 175 | End: 2024-07-15
Payer: COMMERCIAL

## 2024-07-15 ENCOUNTER — APPOINTMENT (INPATIENT)
Dept: CT IMAGING | Facility: HOSPITAL | Age: 44
DRG: 175 | End: 2024-07-15
Attending: STUDENT IN AN ORGANIZED HEALTH CARE EDUCATION/TRAINING PROGRAM
Payer: COMMERCIAL

## 2024-07-15 ENCOUNTER — APPOINTMENT (INPATIENT)
Dept: DIALYSIS | Facility: HOSPITAL | Age: 44
DRG: 175 | End: 2024-07-15
Attending: INTERNAL MEDICINE
Payer: COMMERCIAL

## 2024-07-15 LAB
ANION GAP SERPL CALCULATED.3IONS-SCNC: 9 MMOL/L (ref 4–13)
APTT PPP: 63 SECONDS (ref 23–37)
APTT PPP: 69 SECONDS (ref 23–37)
BASE EX.OXY STD BLDV CALC-SCNC: 84.8 % (ref 60–80)
BASE EXCESS BLDV CALC-SCNC: 2.3 MMOL/L
BUN SERPL-MCNC: 46 MG/DL (ref 5–25)
CALCIUM SERPL-MCNC: 9.1 MG/DL (ref 8.4–10.2)
CHLORIDE SERPL-SCNC: 96 MMOL/L (ref 96–108)
CO2 SERPL-SCNC: 27 MMOL/L (ref 21–32)
CREAT SERPL-MCNC: 10.06 MG/DL (ref 0.6–1.3)
ERYTHROCYTE [DISTWIDTH] IN BLOOD BY AUTOMATED COUNT: 13.8 % (ref 11.6–15.1)
GFR SERPL CREATININE-BSD FRML MDRD: 5 ML/MIN/1.73SQ M
GLUCOSE SERPL-MCNC: 125 MG/DL (ref 65–140)
GLUCOSE SERPL-MCNC: 80 MG/DL (ref 65–140)
GLUCOSE SERPL-MCNC: 83 MG/DL (ref 65–140)
GLUCOSE SERPL-MCNC: 85 MG/DL (ref 65–140)
HCO3 BLDV-SCNC: 28.5 MMOL/L (ref 24–30)
HCT VFR BLD AUTO: 26.6 % (ref 36.5–49.3)
HGB BLD-MCNC: 8.3 G/DL (ref 12–17)
MCH RBC QN AUTO: 29 PG (ref 26.8–34.3)
MCHC RBC AUTO-ENTMCNC: 31.2 G/DL (ref 31.4–37.4)
MCV RBC AUTO: 93 FL (ref 82–98)
O2 CT BLDV-SCNC: 12.3 ML/DL
PCO2 BLDV: 52.5 MM HG (ref 42–50)
PH BLDV: 7.35 [PH] (ref 7.3–7.4)
PLATELET # BLD AUTO: 236 THOUSANDS/UL (ref 149–390)
PMV BLD AUTO: 9 FL (ref 8.9–12.7)
PO2 BLDV: 54.8 MM HG (ref 35–45)
POTASSIUM SERPL-SCNC: 4.6 MMOL/L (ref 3.5–5.3)
RBC # BLD AUTO: 2.86 MILLION/UL (ref 3.88–5.62)
SODIUM SERPL-SCNC: 132 MMOL/L (ref 135–147)
WBC # BLD AUTO: 6.6 THOUSAND/UL (ref 4.31–10.16)

## 2024-07-15 PROCEDURE — 82948 REAGENT STRIP/BLOOD GLUCOSE: CPT

## 2024-07-15 PROCEDURE — 82805 BLOOD GASES W/O2 SATURATION: CPT

## 2024-07-15 PROCEDURE — 80048 BASIC METABOLIC PNL TOTAL CA: CPT

## 2024-07-15 PROCEDURE — 85027 COMPLETE CBC AUTOMATED: CPT

## 2024-07-15 PROCEDURE — NC001 PR NO CHARGE: Performed by: RADIOLOGY

## 2024-07-15 PROCEDURE — 90935 HEMODIALYSIS ONE EVALUATION: CPT | Performed by: INTERNAL MEDICINE

## 2024-07-15 PROCEDURE — 85730 THROMBOPLASTIN TIME PARTIAL: CPT | Performed by: INTERNAL MEDICINE

## 2024-07-15 PROCEDURE — 71045 X-RAY EXAM CHEST 1 VIEW: CPT

## 2024-07-15 PROCEDURE — 99232 SBSQ HOSP IP/OBS MODERATE 35: CPT | Performed by: INTERNAL MEDICINE

## 2024-07-15 RX ORDER — HEPARIN SODIUM 1000 [USP'U]/ML
5000 INJECTION, SOLUTION INTRAVENOUS; SUBCUTANEOUS EVERY 6 HOURS PRN
Status: DISCONTINUED | OUTPATIENT
Start: 2024-07-15 | End: 2024-07-19 | Stop reason: SDUPTHER

## 2024-07-15 RX ORDER — HEPARIN SODIUM 10000 [USP'U]/100ML
3-30 INJECTION, SOLUTION INTRAVENOUS
Status: DISPENSED | OUTPATIENT
Start: 2024-07-15 | End: 2024-07-19

## 2024-07-15 RX ORDER — HEPARIN SODIUM 1000 [USP'U]/ML
10000 INJECTION, SOLUTION INTRAVENOUS; SUBCUTANEOUS EVERY 6 HOURS PRN
Status: DISCONTINUED | OUTPATIENT
Start: 2024-07-15 | End: 2024-07-19 | Stop reason: SDUPTHER

## 2024-07-15 RX ADMIN — HEPARIN SODIUM 27 UNITS/KG/HR: 10000 INJECTION, SOLUTION INTRAVENOUS at 03:45

## 2024-07-15 RX ADMIN — OXYCODONE HYDROCHLORIDE 5 MG: 5 TABLET ORAL at 17:22

## 2024-07-15 RX ADMIN — SEVELAMER HYDROCHLORIDE 800 MG: 800 TABLET ORAL at 17:15

## 2024-07-15 RX ADMIN — OXYCODONE HYDROCHLORIDE 5 MG: 5 TABLET ORAL at 04:32

## 2024-07-15 RX ADMIN — CARVEDILOL 6.25 MG: 6.25 TABLET, FILM COATED ORAL at 17:15

## 2024-07-15 RX ADMIN — HEPARIN SODIUM 27 UNITS/KG/HR: 10000 INJECTION, SOLUTION INTRAVENOUS at 15:39

## 2024-07-15 RX ADMIN — OXYCODONE HYDROCHLORIDE 10 MG: 10 TABLET ORAL at 06:37

## 2024-07-15 RX ADMIN — OXYCODONE HYDROCHLORIDE 10 MG: 10 TABLET ORAL at 15:39

## 2024-07-15 RX ADMIN — IRON SUCROSE 100 MG: 20 INJECTION, SOLUTION INTRAVENOUS at 12:06

## 2024-07-15 RX ADMIN — OXYCODONE HYDROCHLORIDE 5 MG: 5 TABLET ORAL at 09:23

## 2024-07-15 RX ADMIN — ATORVASTATIN CALCIUM 80 MG: 40 TABLET, FILM COATED ORAL at 09:23

## 2024-07-15 RX ADMIN — OXYCODONE HYDROCHLORIDE 10 MG: 10 TABLET ORAL at 19:25

## 2024-07-15 RX ADMIN — HEPARIN SODIUM 21.6 UNITS/KG/HR: 10000 INJECTION, SOLUTION INTRAVENOUS at 23:06

## 2024-07-15 RX ADMIN — OXYCODONE HYDROCHLORIDE 5 MG: 5 TABLET ORAL at 21:55

## 2024-07-15 RX ADMIN — OXYCODONE HYDROCHLORIDE 10 MG: 10 TABLET ORAL at 02:06

## 2024-07-15 NOTE — ASSESSMENT & PLAN NOTE
Overnight pulse oximetry obtained: highest O2 sat 99%, his lowest O2 sat 73%, with a mean of 91%. Patient had 120 desaturation events <89%.  He spent an 1 hour and 15 minutes in desaturation (~15.82%) and and 1 hour and 34 minutes with desaturation <89% (~19.77%).  Counseled patient on importance of maintaining oxygen saturation as oxygen deprivation can lead to hypertension, heart failure, coronary artery disease, pulmonary hypertension, etc.    Patient was advised of need to wear oxygen at night upon discharge. He would like to wait for formal evaluation via sleep study as an outpatient, but agrees to wear supplemental oxygen currently.

## 2024-07-15 NOTE — ASSESSMENT & PLAN NOTE
History of HTN, recent medication change after dx of ESRD  Workup at that time notable for IgG kappa monoclonal gammopathy and s/p bone marrow biopsy on 6/12/2023 to assess for multiple myeloma. Bone marrow biopsy with no evidence of plasma cell neoplasm.   HD on MWF schedule.   Presented to ED with uncontrolled /80. Endorsed headaches and blurry vision in his left eye in the ED that has since resolved.      Plan:  Continue hydralazine 25mg q8hrs,amlodipine 10 mg daily, carvedilol 6.25 mg twice daily, Imdur 60 mg once daily, torsemide 100 mg daily  Hold carvedilol if HR <50  Nephrology onbooard   Continue UF with dialysis as BP tolerates

## 2024-07-15 NOTE — ASSESSMENT & PLAN NOTE
Lab Results   Component Value Date    HGBA1C 5.7 (H) 06/09/2024       Recent Labs     07/17/24  1522 07/17/24  2119 07/18/24  0737 07/18/24  1105   POCGLU 120 118 85 100       Blood Sugar Average: Last 72 hrs:  (P) [id number]    SSI  Hypoglycemia protocol

## 2024-07-15 NOTE — ASSESSMENT & PLAN NOTE
Hemoglobin   Date Value Ref Range Status   07/18/2024 8.9 (L) 12.0 - 17.0 g/dL Final   01/23/2017 14.5 12.6 - 17.7 g/dL Final     Hematocrit   Date Value Ref Range Status   07/18/2024 28.6 (L) 36.5 - 49.3 % Final   01/23/2017 43.8 37.5 - 51.0 % Final       Patient has history of anemia of chronic disease, ESRD  CBC on admission showed hgb 6.6 / hct 21.2  Received 2 units packed RBC's in ED. Hbg 7.6 after transfusion  Hematology following with outpatient f/u scheduled 7/11/2024, patient will reschedule due to overlapping hospital admission   On oral iron twice daily  No active bleeding, on heparin for PE, will switch to oral AC after renal biopsy procedure.  7/8/2024 Patient was transfused 1 unit of blood during hemodialysis.  Hemoglobin 8.8, hematocrit 27.1  S/P 3 units packed RBCs  Start IV Venofer 100 mg with each dialysis treatment for total 10 doses starting 7/10/24     Plan:  Continue to monitor hemoglobin levels  Transfuse for Hgb <8.0

## 2024-07-15 NOTE — NURSING NOTE
Pt rang concerned with possible bug on his blankets. Upon assessment could possibly be bed bug. Resident and hospital supervisor aware. Pt guided to shower and change, and was transferred to room 335. Belongings were sent with security for separate holding. Pt has no concerns for bites at this time.    Wilfredo Ochoa RN

## 2024-07-15 NOTE — ASSESSMENT & PLAN NOTE
Patient presented with worsening substernal chest pain over several weeks.  H/o chronic angina, CAD, family history of hereditary heart disease on fathers side.   Patient was admitted for similar chest pain in early June and was found to have ESRD, started on HD MWF.  Recent nuclear stress test without significant reversible ischemia  EKG on admission: Sinus Rhythm with PAC's. Repeat EKG showed sinus bradycardia  High-sensitivity troponin negative x 5   . D-Dimer 4.21  CTA: subsegmental right lower lobe pulmonary embolism  07/05/24: Updated echocardiogram on unremarkable  Patient blood pressure elevated on admission to 194/80, currently stable.  Patient received 2 doses of nitroglycerin in ED and reports it did not improve his pain, but it improved his elevated blood pressure.  Patient was saturating low 80s on room air on admission and required 3L supplemental O2, which improved saturation to 90s. O2 Sat currently 94% to 95% on room air.  MRSA negative. Viral panel negative.  Multifactorial from acute on chronic anemia, hypertensive urgency, and acute PE   Patient's chest pain has resolved following hemodialysis and the improvement of high blood pressure.   7/9: Telemetry discontinued    Plan-  Per cardiology recs: Continue amlodipine 5 mg daily,Coreg 6.25 mg BID, Imdur 60 mg daily, atorvastatin 80 mg daily. Holding aspirin 81 mg daily

## 2024-07-15 NOTE — ASSESSMENT & PLAN NOTE
Patient O2 saturation on admission was 83% on RA  Was placed on 3/L Nasal canula FiO2 32%  CTA showed right lower lobe subsegmental PE  Was placed on heparin    Plan:  See PE  Continue supplementary oxygen to maintain o2 sat >90%  Incentive Spirometry

## 2024-07-15 NOTE — PROGRESS NOTES
NEPHROLOGY HOSPITAL PROGRESS NOTE   Joaquin Frankel 44 y.o. male MRN: [id number]  Unit/Bed#: S -01 Encounter: [id number]  Reason for Consult: CLOVIS on HD     ASSESSMENT and PLAN:    44-year-old male with a past medical history of CKD stage III with acute kidney injury on prior admission in June started on hemodialysis June 7, 2024, hypertension, monoclonal gammopathy, who is now presenting to the hospital early July with chest pain and concern for PE on admission.  Nephrology is on board for CLOVIS requiring dialysis    1-acute kidney injury currently on dialysis-present on admission.  Underlying CKD stage III.  Baseline creatinine 1.2 to 1/dL    - Had CLOVIS started on dialysis June 7, 2024  - [facility]  - Access-right IJ PermCath  - Etiology-unclear.  MGR S versus other  - Does not have signs of renal recovery as of yet  - Noted attempted biopsy last week but unsuccessful due to respiratory status.  Therefore patient is being planned for renal biopsy July 15 with anesthesia present.  - Prior bone marrow biopsy June 12 did not reveal a plasma cell neoplasm    Plan  - Noted plan for renal biopsy potentially today  - Noted plans with anesthesia for renal biopsy if needed  - On heparin drip-management per primary and IR team  - I reached out to both primary and IR team regarding coordination of biopsy planning.  From renal standpoint dialysis this morning and no objection from primary team  - Patient seen and examined while on dialysis-tolerating treatment.  Sodium 138, bicarbonate 35, F1 60 filter, blood flow rate 400, 4-hour treatment, 4 L ultrafiltration  - Monitor tunneled dialysis catheter site for now  - CBC postbiopsy ordered for tonight with call parameters  - Any issues or worsening pain postbiopsy or bleeding issues, please check CT scan of the abdomen pelvis without contrast stat  - Will need adjustment of dry weight as we continue to ultrafiltrate the patient  - Utilizing heparin  "drip per primary team until final plans for biopsy completed  - No objection to add hydralazine if needed  - Blood pressures for biopsy should be 145 systolic or below ideally  - Hold oral iron as the patient is on IV iron  - Review case with IR team attending and nursing-they do have biopsy forms.  Biopsy planned for today.  Primary team and IR team is coordinating heparin drip.  We are all in agreement with renal plan for dialysis today prebiopsy.    Update - I spoke with anesthesia. Since I saw pt this AM. He is noted to have Sats high 70s-high 80s. Agree that pt is too high risk for procedure today. Solange with PE. I have spoken with the patient over the phone as he was with anesthesia currently.  I have also updated primary team.  Requesting further assistance regarding hypoxia.    Portions of the record may have been created with voice recognition software. Occasional wrong word or \"sound a like\" substitutions may have occurred due to the inherent limitations of voice recognition software. Read the chart carefully and recognize, using context, where substitutions have occurred.If you have any questions, please contact the dictating provider.      2-tunneled dialysis catheter site-very minimal erythema noted at insertion site.  Will plan to monitor for now.  This site appears unchanged from when I saw the patient at [facility].    3-electrolytes-monitor with dialysis-appropriate for now with the exception of borderline hyponatremia in the setting of volume overload which will monitor for now    4-acid/base-monitor with dialysis.  Appropriate for now    5-chest pain-resolved.  With concern for underlying coronary artery disease being medically managed for now with cardiology team    6-volume overload-ultrafiltrate with dialysis as tolerates.    - On torsemide 100 mg daily    7-anemia-no JOSSELYN due to acute PE.  Hemoglobin at goal for biopsy above 8.    On IV iron    8-nephrotic range proteinuria-plan for biopsy " "today    - Serological workup negative except for IgG kappa on SPEP and UPEP    9-hypertension-on amlodipine, carvedilol, torsemide.  Not able to increase carvedilol due to bradycardia    SUBJECTIVE / 24H INTERVAL HISTORY:    Patient seen on dialysis.  Blood pressures this morning 140 systolic.  Currently 150 systolic.  On room air.  Denies complaints.  States has mild congestion and nasal passages but otherwise no shortness of breath.    OBJECTIVE:  Current Weight: Weight - Scale: (!) 175 kg (386 lb 3.2 oz)  Vitals:    07/14/24 1713 07/14/24 2119 07/15/24 0458 07/15/24 0826   BP: 126/63 146/68  141/68   BP Location:    Right arm   Pulse:  57  62   Resp: 18   17   Temp:    98 °F (36.7 °C)   TempSrc:    Oral   SpO2:  91%  (!) 88%   Weight:   (!) 175 kg (386 lb 3.2 oz)    Height:   5' 7\" (1.702 m)        Intake/Output Summary (Last 24 hours) at 7/15/2024 0935  Last data filed at 7/15/2024 0401  Gross per 24 hour   Intake 1940 ml   Output 850 ml   Net 1090 ml     General: NAD  Skin: no rash  Eyes: anicteric sclera  ENT: moist mucous membrane  Neck: supple  Chest: CTA b/l, no ronchii, no wheeze, no rubs, no rales  CVS: s1s2, no murmur, no gallop, no rub  Abdomen: soft, nontender, nl sounds  Extremities: 3-4+ edema LE b/l, tunneled catheter site with mild erythema which is overall unchanged from when I saw the patient at [facility]  : no lewis  Neuro: AAOX3  Psych: normal affect    Medications:    Current Facility-Administered Medications:     acetaminophen (TYLENOL) tablet 650 mg, 650 mg, Oral, Q6H PRN, Mily Martin MD, 650 mg at 07/11/24 1950    amLODIPine (NORVASC) tablet 10 mg, 10 mg, Oral, Daily, Andrea Longoria MD, 10 mg at 07/14/24 0817    atorvastatin (LIPITOR) tablet 80 mg, 80 mg, Oral, Daily, Mily Martin MD, 80 mg at 07/15/24 0923    carvedilol (COREG) tablet 6.25 mg, 6.25 mg, Oral, BID With Meals, Andrea Longoria MD, 6.25 mg at 07/14/24 1610    ferrous sulfate tablet 325 mg, 325 mg, Oral, BID With Meals, " Mily Martin MD, 325 mg at 07/14/24 1610    iron sucrose (VENOFER) 100 mg in sodium chloride 0.9 % 100 mL IVPB, 100 mg, Intravenous, After Dialysis, Johnny Viramontes MD, Stopped at 07/12/24 1134    isosorbide mononitrate (IMDUR) 24 hr tablet 60 mg, 60 mg, Oral, Daily, Mily Martin MD, 60 mg at 07/14/24 0817    labetalol (NORMODYNE) injection 10 mg, 10 mg, Intravenous, Q6H PRN, Liana Riley MD    lidocaine (LIDODERM) 5 % patch 1 patch, 1 patch, Topical, Daily, Mily Martin MD    nystatin (MYCOSTATIN) powder, , Topical, BID, Lorri Maria MD, Given at 07/14/24 1610    ondansetron (ZOFRAN) injection 4 mg, 4 mg, Intravenous, Once, Cindy Balderas MD    oxyCODONE (ROXICODONE) immediate release tablet 10 mg, 10 mg, Oral, Q4H PRN, Lorri Maria MD, 10 mg at 07/15/24 0637    oxyCODONE (ROXICODONE) IR tablet 5 mg, 5 mg, Oral, Q4H PRN, Lorri Maria MD, 5 mg at 07/15/24 0923    polyethylene glycol (MIRALAX) packet 17 g, 17 g, Oral, Daily PRN, Diane Oliveira MD    sevelamer (RENAGEL) tablet 800 mg, 800 mg, Oral, TID With Meals, Mily Martin MD, 800 mg at 07/14/24 1610    torsemide (DEMADEX) tablet 100 mg, 100 mg, Oral, Daily, Charles Spear MD, 100 mg at 07/14/24 0817    Laboratory Results:  Results from last 7 days   Lab Units 07/15/24  0459 07/13/24  0745 07/12/24  0549 07/11/24  0925 07/10/24  0517 07/09/24  0433 07/08/24  2021   WBC Thousand/uL 6.60 5.99 6.08 6.48 6.53  --   --    HEMOGLOBIN g/dL 8.3* 9.4* 8.1* 9.1* 8.1*  --  8.8*   HEMATOCRIT % 26.6* 30.0* 25.9* 28.9* 25.9*  --  27.1*   PLATELETS Thousands/uL 236 261 239 241 233  --   --    POTASSIUM mmol/L 4.6 4.4 4.3 4.0 4.3 4.0  --    CHLORIDE mmol/L 96 95* 95* 94* 96 96  --    CO2 mmol/L 27 27 27 28 28 30  --    BUN mg/dL 46* 27* 39* 32* 41* 32*  --    CREATININE mg/dL 10.06* 7.33* 8.84* 8.13* 9.56* 8.05*  --    CALCIUM mg/dL 9.1 9.3 9.0 9.2 9.1 8.6  --

## 2024-07-15 NOTE — ASSESSMENT & PLAN NOTE
Patient presented for worsening chest pain, shortness of breath, and dyspnea on exertion.  Patient was admitted for similar chest pain in early June and was found to have ESRD, started on HD MWF   D-Dimer 4.21  CTA showed subsegmental right lower lobe pulmonary embolism  Placed on heparin drip  7/12/24 Heparin was subtherapeutic this morning, PTT 55. No bolus was given or increase in gtt as patient will have the gtt stopped at 10 am pending IR procedure.  Saturating well on 1 L via nasal cannula    Plan:  On IV heparing, bridging to warfarin 5 mg daily at 6 pm this evening  Goal INR 2-3, check in the morning  Monitor respiratory status, maintain o2 sat above 90% with supplementary O2 as needed

## 2024-07-15 NOTE — PLAN OF CARE
Problem: Potential for Falls  Goal: Patient will remain free of falls  Description: INTERVENTIONS:  - Educate patient/family on patient safety including physical limitations  - Instruct patient to call for assistance with activity   - Consult OT/PT to assist with strengthening/mobility   - Keep Call bell within reach  - Keep bed low and locked with side rails adjusted as appropriate  - Keep care items and personal belongings within reach  - Initiate and maintain comfort rounds  - Make Fall Risk Sign visible to staff  - Offer Toileting every 2 Hours, in advance of need  - Initiate/Maintain alarm  - Obtain necessary fall risk management equipment:   - Apply yellow socks and bracelet for high fall risk patients  - Consider moving patient to room near nurses station  Outcome: Progressing     Problem: METABOLIC, FLUID AND ELECTROLYTES - ADULT  Goal: Electrolytes maintained within normal limits  Description: INTERVENTIONS:  - Monitor labs and assess patient for signs and symptoms of electrolyte imbalances  - Administer electrolyte replacement as ordered  - Monitor response to electrolyte replacements, including repeat lab results as appropriate  - Instruct patient on fluid and nutrition as appropriate  Outcome: Progressing  Goal: Fluid balance maintained  Description: INTERVENTIONS:  - Monitor labs   - Monitor I/O and WT  - Instruct patient on fluid and nutrition as appropriate  - Assess for signs & symptoms of volume excess or deficit  Outcome: Progressing     Problem: Prexisting or High Potential for Compromised Skin Integrity  Goal: Skin integrity is maintained or improved  Description: INTERVENTIONS:  - Identify patients at risk for skin breakdown  - Assess and monitor skin integrity  - Assess and monitor nutrition and hydration status  - Monitor labs   - Assess for incontinence   - Turn and reposition patient  - Assist with mobility/ambulation  - Relieve pressure over bony prominences  - Avoid friction and  shearing  - Provide appropriate hygiene as needed including keeping skin clean and dry  - Evaluate need for skin moisturizer/barrier cream  - Collaborate with interdisciplinary team   - Patient/family teaching  - Consider wound care consult   Outcome: Progressing     Problem: Nutrition/Hydration-ADULT  Goal: Nutrient/Hydration intake appropriate for improving, restoring or maintaining nutritional needs  Description: Monitor and assess patient's nutrition/hydration status for malnutrition. Collaborate with interdisciplinary team and initiate plan and interventions as ordered.  Monitor patient's weight and dietary intake as ordered or per policy. Utilize nutrition screening tool and intervene as necessary. Determine patient's food preferences and provide high-protein, high-caloric foods as appropriate.     INTERVENTIONS:  - Monitor oral intake, urinary output, labs, and treatment plans  - Assess nutrition and hydration status and recommend course of action  - Evaluate amount of meals eaten  - Assist patient with eating if necessary   - Allow adequate time for meals  - Recommend/ encourage appropriate diets, oral nutritional supplements, and vitamin/mineral supplements  - Order, calculate, and assess calorie counts as needed  - Recommend, monitor, and adjust tube feedings and TPN/PPN based on assessed needs  - Assess need for intravenous fluids  - Provide specific nutrition/hydration education as appropriate  - Include patient/family/caregiver in decisions related to nutrition  Outcome: Progressing     Problem: PAIN - ADULT  Goal: Verbalizes/displays adequate comfort level or baseline comfort level  Description: Interventions:  - Encourage patient to monitor pain and request assistance  - Assess pain using appropriate pain scale  - Administer analgesics based on type and severity of pain and evaluate response  - Implement non-pharmacological measures as appropriate and evaluate response  - Consider cultural and social  influences on pain and pain management  - Notify physician/advanced practitioner if interventions unsuccessful or patient reports new pain  Outcome: Progressing     Problem: INFECTION - ADULT  Goal: Absence or prevention of progression during hospitalization  Description: INTERVENTIONS:  - Assess and monitor for signs and symptoms of infection  - Monitor lab/diagnostic results  - Monitor all insertion sites, i.e. indwelling lines, tubes, and drains  - Monitor endotracheal if appropriate and nasal secretions for changes in amount and color  - [place] appropriate cooling/warming therapies per order  - Administer medications as ordered  - Instruct and encourage patient and family to use good hand hygiene technique  - Identify and instruct in appropriate isolation precautions for identified infection/condition  Outcome: Progressing  Goal: Absence of fever/infection during neutropenic period  Description: INTERVENTIONS:  - Monitor WBC    Outcome: Progressing     Problem: SAFETY ADULT  Goal: Patient will remain free of falls  Description: INTERVENTIONS:  - Educate patient/family on patient safety including physical limitations  - Instruct patient to call for assistance with activity   - Consult OT/PT to assist with strengthening/mobility   - Keep Call bell within reach  - Keep bed low and locked with side rails adjusted as appropriate  - Keep care items and personal belongings within reach  - Initiate and maintain comfort rounds  - Make Fall Risk Sign visible to staff  - Offer Toileting every 2 Hours, in advance of need  - Initiate/Maintain alarm  - Obtain necessary fall risk management equipment:   - Apply yellow socks and bracelet for high fall risk patients  - Consider moving patient to room near nurses station  Outcome: Progressing  Goal: Maintain or return to baseline ADL function  Description: INTERVENTIONS:  -  Assess patient's ability to carry out ADLs; assess patient's baseline for ADL function and identify  physical deficits which impact ability to perform ADLs (bathing, care of mouth/teeth, toileting, grooming, dressing, etc.)  - Assess/evaluate cause of self-care deficits   - Assess range of motion  - Assess patient's mobility; develop plan if impaired  - Assess patient's need for assistive devices and provide as appropriate  - Encourage maximum independence but intervene and supervise when necessary  - Involve family in performance of ADLs  - Assess for home care needs following discharge   - Consider OT consult to assist with ADL evaluation and planning for discharge  - Provide patient education as appropriate  Outcome: Progressing  Goal: Maintains/Returns to pre admission functional level  Description: INTERVENTIONS:  - Perform AM-PAC 6 Click Basic Mobility/ Daily Activity assessment daily.  - Set and communicate daily mobility goal to care team and patient/family/caregiver.   - Collaborate with rehabilitation services on mobility goals if consulted  - Perform Range of Motion 2 times a day.  - Reposition patient every 2 hours.  - Dangle patient 2 times a day  - Stand patient 2 times a day  - Ambulate patient 2 times a day  - Out of bed to chair 2 times a day   - Out of bed for meals 2 times a day  - Out of bed for toileting  - Record patient progress and toleration of activity level   Outcome: Progressing     Problem: DISCHARGE PLANNING  Goal: Discharge to home or other facility with appropriate resources  Description: INTERVENTIONS:  - Identify barriers to discharge w/patient and caregiver  - Arrange for needed discharge resources and transportation as appropriate  - Identify discharge learning needs (meds, wound care, etc.)  - Arrange for interpretive services to assist at discharge as needed  - Refer to Case Management Department for coordinating discharge planning if the patient needs post-hospital services based on physician/advanced practitioner order or complex needs related to functional status, cognitive  ability, or social support system  Outcome: Progressing     Problem: Knowledge Deficit  Goal: Patient/family/caregiver demonstrates understanding of disease process, treatment plan, medications, and discharge instructions  Description: Complete learning assessment and assess knowledge base.  Interventions:  - Provide teaching at level of understanding  - Provide teaching via preferred learning methods  Outcome: Progressing

## 2024-07-15 NOTE — ASSESSMENT & PLAN NOTE
Lab Results   Component Value Date    EGFR 6 07/19/2024    EGFR 7 07/18/2024    EGFR 5 07/17/2024    CREATININE 9.40 (H) 07/19/2024    CREATININE 7.66 (H) 07/18/2024    CREATININE 9.55 (H) 07/17/2024     Patient ESRD on Dialysis 3 times a week, MWF  Elevated , c/o of orthopnea   Etiology of CLOVIS: Etiology not entirely clear - Possible MGRS? Pattern of disease progression is not typical for diabetic nephropathy.   Etiology of CKD: Suspected due to diabetic nephropathy, hypertensive nephrosclerosis, obesity related renal dysfunction  Previous baseline creatinine 1.3-1.6 w/ prior episodes of CLOVIS  Serologic workup negative except for IgG kappa on SPEP and UPEP.  Cryoglobulin negative, ANCA negative, anti-dsDNA negative, anti-GBM negative, JOSE ROBERTO negative, C4 34, C3 118  Bone marrow biopsy 6/12/2024: negative for plasma cell neoplasm.   CRRT initiated 6/7/2024. Peak creatinine 10.4  Last dialyzed 7/17/2024   Dry weight decreased to 172.5 kg   electrolytes and acid/base stable  7/12 renal biopsy was not performed due to hypertension despite sedation and antihypertensive medications. Rescheduled for Monday 7/15/24  7/15 renal biopsy was not completed due to oxygen saturation of 76-89% during dialysis.  Likely due to ANGY/OHS.  Agreed to wear oxygen via nasal cannula.  7/19 Biopsy will be attempted under local anesthesia and very light sedation with ultrasound-guidance in an attempt to limit need for possible intubation    Plan:  Continue HD per nephrology  On torsemide 100 mg daily  Monitor I/O, Daily weight  Trend BMP and electrolytes

## 2024-07-15 NOTE — PROGRESS NOTES
[facility]  Progress Note  Name: Joaquin Frankel I  MRN: [id number]  Unit/Bed#: S -01 I Date of Admission: 7/4/2024   Date of Service: 7/15/2024 I Hospital Day: 11    Assessment & Plan   * Chest pain  Assessment & Plan  Patient presented with worsening substernal chest pain over several weeks.  H/o chronic angina, CAD, family history of hereditary heart disease on fathers side.   Patient was admitted for similar chest pain in early June and was found to have ESRD, started on HD MWF.  Recent nuclear stress test without significant reversible ischemia  EKG on admission: Sinus Rhythm with PAC's. Repeat EKG showed sinus bradycardia  High-sensitivity troponin negative x 5   . D-Dimer 4.21  CTA: subsegmental right lower lobe pulmonary embolism  07/05/24: Updated echocardiogram on unremarkable  Patient blood pressure elevated on admission to 194/80, currently stable.  Patient received 2 doses of nitroglycerin in ED and reports it did not improve his pain, but it improved his elevated blood pressure.  Patient was saturating low 80s on room air on admission and required 3L supplemental O2, which improved saturation to 90s. O2 Sat currently 94% to 95% on room air.  MRSA negative. Viral panel negative.  Multifactorial from acute on chronic anemia, hypertensive urgency, and acute PE   Patient's chest pain has resolved following hemodialysis and the improvement of high blood pressure.   7/9: Telemetry discontinued    Plan-  Per cardiology recs: Continue amlodipine 5 mg daily,Coreg 6.25 mg BID, Imdur 60 mg daily, atorvastatin 80 mg daily. Holding aspirin 81 mg daily    Pulmonary embolism (HCC)  Assessment & Plan  Patient presented for worsening chest pain, shortness of breath, and dyspnea on exertion.  Patient was admitted for similar chest pain in early June and was found to have ESRD, started on HD MWF   D-Dimer 4.21  CTA showed subsegmental right lower lobe pulmonary  embolism  Placed on heparin drip  7/12/24 Heparin was subtherapeutic this morning, PTT 55. No bolus was given or increase in gtt as patient will have the gtt stopped at 10 am pending IR procedure.    Plan  Hold on 7/15 8AM for kidney biopsy   Transition to warfarin 5 mg daily after renal biopsy   Supplemental oxygen as needed    ESRD (end stage renal disease) on dialysis (HCC)  Assessment & Plan  Lab Results   Component Value Date    EGFR 5 07/15/2024    EGFR 8 07/13/2024    EGFR 6 07/12/2024    CREATININE 10.06 (H) 07/15/2024    CREATININE 7.33 (H) 07/13/2024    CREATININE 8.84 (H) 07/12/2024     Patient ESRD on Dialysis 3 times a week, MWF  Elevated , c/o of orthopnea   Etiology of CLOVIS: Etiology not entirely clear - Possible MGRS? Pattern of disease progression is not typical for diabetic nephropathy.   Etiology of CKD: Suspected due to diabetic nephropathy, hypertensive nephrosclerosis, obesity related renal dysfunction  Previous baseline creatinine 1.3-1.6 w/ prior episodes of CLOVIS  Serologic workup negative except for IgG kappa on SPEP and UPEP.  Cryoglobulin negative, ANCA negative, anti-dsDNA negative, anti-GBM negative, JOSE ROBERTO negative, C4 34, C3 118  Bone marrow biopsy 6/12/2024: negative for plasma cell neoplasm.   CRRT initiated 6/7/2024.  Peak creatinine 10.4  Last dialyzed 7/15/2024 removed 4 L  Dry weight decreased to 172.5 kg   electrolytes and acid/base stable  7/12 renal biopsy was not performed due to hypertension despite sedation and antihypertensive medications. Rescheduled for Monday 7/15/24    Plan:  Nephrology onboard  NPO today  Hold heparin on today, 7/15, 8AM for kidney biopsy   CBC postbiopsy ordered for tonight with call parameters  Any issues or worsening pain postbiopsy or bleeding issues, please check CT scan of the abdomen pelvis without contrast stat  Hold oral AC. Resume ASA 81 mg daily after renal biopsy   HD per nephro.  Continue torsemide 100 mg  daily    Hypertension  Assessment & Plan  History of HTN, recent medication change after dx of ESRD  Workup at that time notable for IgG kappa monoclonal gammopathy and s/p bone marrow biopsy on 6/12/2023 to assess for multiple myeloma. Bone marrow biopsy with no evidence of plasma cell neoplasm.   HD on MWF schedule.   Presented to ED with uncontrolled /80. Endorsed headaches and blurry vision in his left eye in the ED that has since resolved.  BP improved    Plan:  Continue amlodipine 10 mg daily, carvedilol 6.25 mg twice daily, Imdur 60 mg once daily, torsemide 100 mg daily  Hold carvedilol if HR <50  Nephrology and cardiology onbooard   Continue UF with dialysis as BP tolerates     Type 2 diabetes mellitus (HCC)  Assessment & Plan  Lab Results   Component Value Date    HGBA1C 5.7 (H) 06/09/2024       Recent Labs     07/14/24  1712 07/14/24  2125 07/15/24  0819 07/15/24  1146   POCGLU 112 129 83 80       Blood Sugar Average: Last 72 hrs:  (P) [id number]      Anemia  Assessment & Plan    Hemoglobin   Date Value Ref Range Status   07/15/2024 8.3 (L) 12.0 - 17.0 g/dL Final   01/23/2017 14.5 12.6 - 17.7 g/dL Final     Hematocrit   Date Value Ref Range Status   07/15/2024 26.6 (L) 36.5 - 49.3 % Final   01/23/2017 43.8 37.5 - 51.0 % Final       Patient has history of anemia of chronic disease, ESRD  CBC on admission showed hgb 6.6 / hct 21.2  Received 2 units packed RBC's in ED. Hbg 7.6 after transfusion  Hematology following with outpatient f/u scheduled 7/11/2024, patient will reschedule due to overlapping hospital admission   On oral iron twice daily  No active bleeding, on heparin for PE, will switch to oral AC after renal biopsy procedure.  7/8/2024 Patient was transfused 1 unit of blood during hemodialysis.  Hemoglobin 8.8, hematocrit 27.1  S/P 3 units packed RBCs  Start IV Venofer 100 mg with each dialysis treatment for total 10 doses starting 7/10/24     Plan:  Continue to monitor hemoglobin  levels  Transfuse for Hgb <8.0    Hyponatremia  Assessment & Plan  Lab Results   Component Value Date/Time    SODIUM 132 (L) 07/15/2024 04:59 AM     Likely due to fluid overload.     Plan:  Per nephro, fluid restriction to 1.5 L/day  Continue with hemodialysis  F/u BMP in AM    ANGY (obstructive sleep apnea)  Assessment & Plan  Overnight pulse oximetry obtained: highest O2 sat 99%, his lowest O2 sat 73%, with a mean of 91%. Patient had 120 desaturation events <89%.  He spent an 1 hour and 15 minutes in desaturation (~15.82%) and and 1 hour and 34 minutes with desaturation <89% (~19.77%).  Counseled patient on importance of maintaining oxygen saturation as oxygen deprivation can lead to hypertension, heart failure, coronary artery disease, pulmonary hypertension, etc.    Patient was advised of need to wear oxygen at night upon discharge. He declines stating he would like to wait for formal evaluation via sleep study as an outpatient.    Chronic acquired lymphedema  Assessment & Plan  Chronic LLE lymphedema x 3 years after surgical resection of LLE cyst    Plan:  Advised compression and elevation of lower extremity for symptomatic edema relief    Acute respiratory failure with hypoxia (HCC)-resolved as of 7/6/2024  Assessment & Plan  Patient O2 saturation on admission was 83% on RA  Was placed on 3/L Nasal canula FiO2 32%  CTA showed right lower lobe subsegmental PE  Was placed on heparin    Plan:  Supplemental oxygen as needed  Continue heparin for PE  Incentive Spirometry           VTE Pharmacologic Prophylaxis: VTE Score: 7 High Risk (Score >/= 5) - Pharmacological DVT Prophylaxis Ordered: heparin drip. Sequential Compression Devices Ordered.    Mobility:   Basic Mobility Inpatient Raw Score: 24  JH-HLM Goal: 8: Walk 250 feet or more  JH-HLM Achieved: 6: Walk 10 steps or more  JH-HLM Goal achieved. Continue to encourage appropriate mobility.    Patient Centered Rounds: I performed bedside rounds with nursing staff  today.   Discussions with Specialists or Other Care Team Provider: cardiology, nephrology    Education and Discussions with Family / Patient: Patient declined call to .     Current Length of Stay: 11 day(s)  Current Patient Status: Inpatient   Discharge Plan: Anticipate discharge in 48-72 hrs to home.    Code Status: Level 1 - Full Code    Subjective:  Patient was seen and evaluated at bedside. Yesterday, patient and nurse noticed a bug lying in his bed presumed to be a bedbug. He was moved to a new room and skin was examined for further bugs/bites.  None noted.  Patient denies being bitten, noticing any other bugs.  He also denies any further episodes of nausea/vomiting, abdominal pain chest pain, shortness of breath. Today patient was dialyzed and is scheduled for his renal biopsy. Heparin was stopped this morning. Patient was informed of the plan. He understands and agrees.    Objective:     Vitals:   Temp (24hrs), Av °F (36.7 °C), Min:98 °F (36.7 °C), Max:98 °F (36.7 °C)    Temp:  [98 °F (36.7 °C)] 98 °F (36.7 °C)  HR:  [53-62] 59  Resp:  [17-18] 18  BP: (126-168)/(63-89) 168/81  SpO2:  [76 %-96 %] 84 %  Body mass index is 60.49 kg/m².     Input and Output Summary (last 24 hours):     Intake/Output Summary (Last 24 hours) at 7/15/2024 1433  Last data filed at 7/15/2024 0919  Gross per 24 hour   Intake 1660 ml   Output --   Net 1660 ml       Physical Exam:   Physical Exam  Vitals and nursing note reviewed.   Constitutional:       General: He is not in acute distress.     Appearance: He is obese. He is not ill-appearing.   HENT:      Head: Normocephalic and atraumatic.      Right Ear: External ear normal.      Left Ear: External ear normal.      Nose: Nose normal.      Mouth/Throat:      Mouth: Mucous membranes are moist.      Pharynx: Oropharynx is clear.   Eyes:      Extraocular Movements: Extraocular movements intact.      Conjunctiva/sclera: Conjunctivae normal.   Cardiovascular:      Rate  and Rhythm: Normal rate and regular rhythm.      Pulses: Normal pulses.      Heart sounds: Normal heart sounds. No murmur heard.     No gallop.   Pulmonary:      Effort: Pulmonary effort is normal. No respiratory distress.      Breath sounds: Normal breath sounds. No wheezing.      Comments: On room air  Abdominal:      General: Bowel sounds are normal. There is no distension.      Palpations: Abdomen is soft.      Tenderness: There is no abdominal tenderness.      Comments: Lymphedema noted to the left lower pannus  Skin under pannus appears irritated with some areas of maceration.  There is also a small draining wound present.   Musculoskeletal:         General: Normal range of motion.      Cervical back: Normal range of motion and neck supple. No rigidity.      Right lower leg: Edema (violaceous skin changes right lower extremity with healing punctate wounds over shin) present.      Left lower leg: Edema (lymphedema) present.      Comments: LLE significantly larger than RLE due to chronic lymphedema. Healed surgical incision noted to left calf.    Skin:     General: Skin is warm and dry.      Comments: R IJV PermCath site clear. Dressing intact.   Neurological:      General: No focal deficit present.      Mental Status: He is alert and oriented to person, place, and time. Mental status is at baseline.   Psychiatric:         Mood and Affect: Mood normal.          Additional Data:     Labs:  Results from last 7 days   Lab Units 07/15/24  0459 07/13/24  0745 07/12/24  0549   WBC Thousand/uL 6.60   < > 6.08   HEMOGLOBIN g/dL 8.3*   < > 8.1*   HEMATOCRIT % 26.6*   < > 25.9*   PLATELETS Thousands/uL 236   < > 239   SEGS PCT %  --   --  65   LYMPHO PCT %  --   --  16   MONO PCT %  --   --  10   EOS PCT %  --   --  7*    < > = values in this interval not displayed.     Results from last 7 days   Lab Units 07/15/24  0459   SODIUM mmol/L 132*   POTASSIUM mmol/L 4.6   CHLORIDE mmol/L 96   CO2 mmol/L 27   BUN mg/dL 46*    CREATININE mg/dL 10.06*   ANION GAP mmol/L 9   CALCIUM mg/dL 9.1   GLUCOSE RANDOM mg/dL 85     Results from last 7 days   Lab Units 07/12/24  1623   INR  1.06       Results from last 7 days   Lab Units 07/15/24  1146 07/15/24  0819 07/14/24  2125 07/14/24  1712 07/14/24  1116 07/14/24  0748 07/13/24  2052 07/13/24  1606 07/13/24  1145 07/13/24  0808 07/12/24  2157 07/12/24  1521   POC GLUCOSE mg/dl 80 83 129 112 104 89 117 126 103 95 133 82               Lines/Drains:  Invasive Devices       Peripheral Intravenous Line  Duration             Peripheral IV 07/14/24 Left;Medial Forearm <1 day              Hemodialysis Catheter  Duration             HD Permanent Double Catheter 30 days                    Imaging: Reviewed radiology reports from this admission including: chest xray, abdominal/pelvic CT, and ECHO    Recent Cultures (last 7 days):         Last 24 Hours Medication List:   Current Facility-Administered Medications   Medication Dose Route Frequency Provider Last Rate    acetaminophen  650 mg Oral Q6H PRN Mily Martin MD      amLODIPine  10 mg Oral Daily Andrea Longoria MD      atorvastatin  80 mg Oral Daily Mily Martin MD      carvedilol  6.25 mg Oral BID With Meals Andrea Longoria MD      iron sucrose  100 mg Intravenous After Dialysis Johnny Viramontes MD Stopped (07/15/24 1311)    isosorbide mononitrate  60 mg Oral Daily Mily Martin MD      labetalol  10 mg Intravenous Q6H PRN Liana Riley MD      lidocaine  1 patch Topical Daily Mily Martin MD      nystatin   Topical BID Lorri Maria MD      ondansetron  4 mg Intravenous Once Cindy Balderas MD      oxyCODONE  10 mg Oral Q4H PRN Lorri Maria MD      oxyCODONE  5 mg Oral Q4H PRN Lorri Maria MD      polyethylene glycol  17 g Oral Daily PRN Diane Oliveira MD      sevelamer  800 mg Oral TID With Meals Mily Martin MD      torsemide  100 mg Oral Daily Charles Spear MD          Today, Patient Was Seen By: Deya Palomino MD    **Please  Note: This note may have been constructed using a voice recognition system.**

## 2024-07-15 NOTE — PLAN OF CARE
Post-Dialysis RN Treatment Note    Blood Pressure:  Pre 155/74 mm/Hg  Post 168/81 mmHg   EDW  172.5 kg    Weight:  Pre 176.5 kg   Post 173.1 kg   Mode of weight measurement: Bed Scale   Volume Removed  4003 ml    Treatment duration 240 minutes    NS given  No    Treatment shortened? No   Medications given during Rx venofer 100 mg   Estimated Kt/V  None Reported   Access type: Permacath/TDC   Access Issues: Yes, describe: lines reversed for optimal bfr     Report called to primary nurse   Yes Maryann Lambert RN        Current hemodialysis plan of care is to remove a total of 4500 ml of fluid over a 4 hour treatment for a net of 4 liters as tolerated.  Monitor vital signs every 15 minutes while on treatment for patient safety.  Utilize a 2 K+ bath for serum potassium of 4.6 to maintain electrolyte balance.  Report received from Maryann Lambert RN.  Plan reviewed with Dr JOHN Velazco at bedside.      Problem: METABOLIC, FLUID AND ELECTROLYTES - ADULT  Goal: Electrolytes maintained within normal limits  Description: INTERVENTIONS:  - Monitor labs and assess patient for signs and symptoms of electrolyte imbalances  - Administer electrolyte replacement as ordered  - Monitor response to electrolyte replacements, including repeat lab results as appropriate  - Instruct patient on fluid and nutrition as appropriate  Outcome: Progressing  Goal: Fluid balance maintained  Description: INTERVENTIONS:  - Monitor labs   - Monitor I/O and WT  - Instruct patient on fluid and nutrition as appropriate  - Assess for signs & symptoms of volume excess or deficit  Outcome: Progressing

## 2024-07-15 NOTE — ASSESSMENT & PLAN NOTE
Lab Results   Component Value Date/Time    SODIUM 131 (L) 07/19/2024 06:13 AM     Likely due to fluid overload.     Plan:  Per nephro, fluid restriction to 1.5 L/day  Continue with hemodialysis  F/u BMP in AM

## 2024-07-15 NOTE — PROGRESS NOTES
Patient came down to the interventional radiology suite for a random kidney biopsy.  Anesthesia team was involved.  Multidisciplinary discussion between my team (IR), anesthesia and nephrology was held.  There is significant concern about patient's airway and respiratory status.  He is noted to have hypoxemia with SpO2 less than 90 throughout the day.  Per my anesthesia colleague, he is at high risk for respiratory decompensation if he were to be prone and sedated.  Per Dr. Velazco (nephrology), there is still room for further optimization.  Given this, we all agree to postpone the random kidney biopsy until he is optimized.  Random kidney biopsy will be rescheduled; timing will be determined.

## 2024-07-16 LAB
ANION GAP SERPL CALCULATED.3IONS-SCNC: 6 MMOL/L (ref 4–13)
APTT PPP: 75 SECONDS (ref 23–37)
BUN SERPL-MCNC: 32 MG/DL (ref 5–25)
CALCIUM SERPL-MCNC: 8.8 MG/DL (ref 8.4–10.2)
CHLORIDE SERPL-SCNC: 95 MMOL/L (ref 96–108)
CO2 SERPL-SCNC: 30 MMOL/L (ref 21–32)
CREAT SERPL-MCNC: 8 MG/DL (ref 0.6–1.3)
ERYTHROCYTE [DISTWIDTH] IN BLOOD BY AUTOMATED COUNT: 13.8 % (ref 11.6–15.1)
GFR SERPL CREATININE-BSD FRML MDRD: 7 ML/MIN/1.73SQ M
GLUCOSE SERPL-MCNC: 105 MG/DL (ref 65–140)
GLUCOSE SERPL-MCNC: 107 MG/DL (ref 65–140)
GLUCOSE SERPL-MCNC: 117 MG/DL (ref 65–140)
GLUCOSE SERPL-MCNC: 87 MG/DL (ref 65–140)
GLUCOSE SERPL-MCNC: 89 MG/DL (ref 65–140)
HCT VFR BLD AUTO: 27.3 % (ref 36.5–49.3)
HGB BLD-MCNC: 8.7 G/DL (ref 12–17)
MAGNESIUM SERPL-MCNC: 1.8 MG/DL (ref 1.9–2.7)
MCH RBC QN AUTO: 29.4 PG (ref 26.8–34.3)
MCHC RBC AUTO-ENTMCNC: 31.9 G/DL (ref 31.4–37.4)
MCV RBC AUTO: 92 FL (ref 82–98)
PHOSPHATE SERPL-MCNC: 4.3 MG/DL (ref 2.7–4.5)
PLATELET # BLD AUTO: 175 THOUSANDS/UL (ref 149–390)
PMV BLD AUTO: 9.2 FL (ref 8.9–12.7)
POTASSIUM SERPL-SCNC: 4.3 MMOL/L (ref 3.5–5.3)
RBC # BLD AUTO: 2.96 MILLION/UL (ref 3.88–5.62)
SODIUM SERPL-SCNC: 131 MMOL/L (ref 135–147)
WBC # BLD AUTO: 5.84 THOUSAND/UL (ref 4.31–10.16)

## 2024-07-16 PROCEDURE — 84100 ASSAY OF PHOSPHORUS: CPT | Performed by: INTERNAL MEDICINE

## 2024-07-16 PROCEDURE — 85027 COMPLETE CBC AUTOMATED: CPT | Performed by: INTERNAL MEDICINE

## 2024-07-16 PROCEDURE — 83735 ASSAY OF MAGNESIUM: CPT | Performed by: INTERNAL MEDICINE

## 2024-07-16 PROCEDURE — 99232 SBSQ HOSP IP/OBS MODERATE 35: CPT | Performed by: INTERNAL MEDICINE

## 2024-07-16 PROCEDURE — 85730 THROMBOPLASTIN TIME PARTIAL: CPT | Performed by: INTERNAL MEDICINE

## 2024-07-16 PROCEDURE — 80048 BASIC METABOLIC PNL TOTAL CA: CPT | Performed by: INTERNAL MEDICINE

## 2024-07-16 PROCEDURE — 82948 REAGENT STRIP/BLOOD GLUCOSE: CPT

## 2024-07-16 RX ORDER — HYDRALAZINE HYDROCHLORIDE 10 MG/1
10 TABLET, FILM COATED ORAL EVERY 8 HOURS SCHEDULED
Status: DISCONTINUED | OUTPATIENT
Start: 2024-07-16 | End: 2024-07-18

## 2024-07-16 RX ORDER — MAGNESIUM SULFATE HEPTAHYDRATE 40 MG/ML
2 INJECTION, SOLUTION INTRAVENOUS ONCE
Status: COMPLETED | OUTPATIENT
Start: 2024-07-16 | End: 2024-07-16

## 2024-07-16 RX ADMIN — OXYCODONE HYDROCHLORIDE 5 MG: 5 TABLET ORAL at 03:41

## 2024-07-16 RX ADMIN — MAGNESIUM SULFATE HEPTAHYDRATE 2 G: 40 INJECTION, SOLUTION INTRAVENOUS at 10:24

## 2024-07-16 RX ADMIN — SEVELAMER HYDROCHLORIDE 800 MG: 800 TABLET ORAL at 09:10

## 2024-07-16 RX ADMIN — CARVEDILOL 6.25 MG: 6.25 TABLET, FILM COATED ORAL at 16:31

## 2024-07-16 RX ADMIN — HEPARIN SODIUM 27 UNITS/KG/HR: 10000 INJECTION, SOLUTION INTRAVENOUS at 14:19

## 2024-07-16 RX ADMIN — SEVELAMER HYDROCHLORIDE 800 MG: 800 TABLET ORAL at 12:13

## 2024-07-16 RX ADMIN — ATORVASTATIN CALCIUM 80 MG: 40 TABLET, FILM COATED ORAL at 09:10

## 2024-07-16 RX ADMIN — OXYCODONE HYDROCHLORIDE 10 MG: 10 TABLET ORAL at 21:18

## 2024-07-16 RX ADMIN — OXYCODONE HYDROCHLORIDE 10 MG: 10 TABLET ORAL at 16:31

## 2024-07-16 RX ADMIN — ISOSORBIDE MONONITRATE 60 MG: 60 TABLET, EXTENDED RELEASE ORAL at 09:10

## 2024-07-16 RX ADMIN — OXYCODONE HYDROCHLORIDE 10 MG: 10 TABLET ORAL at 05:07

## 2024-07-16 RX ADMIN — AMLODIPINE BESYLATE 10 MG: 10 TABLET ORAL at 09:10

## 2024-07-16 RX ADMIN — NYSTATIN: 100000 POWDER TOPICAL at 18:00

## 2024-07-16 RX ADMIN — OXYCODONE HYDROCHLORIDE 5 MG: 5 TABLET ORAL at 14:19

## 2024-07-16 RX ADMIN — OXYCODONE HYDROCHLORIDE 5 MG: 5 TABLET ORAL at 19:37

## 2024-07-16 RX ADMIN — HEPARIN SODIUM 27 UNITS/KG/HR: 10000 INJECTION, SOLUTION INTRAVENOUS at 06:29

## 2024-07-16 RX ADMIN — TORSEMIDE 100 MG: 100 TABLET ORAL at 09:10

## 2024-07-16 RX ADMIN — OXYCODONE HYDROCHLORIDE 10 MG: 10 TABLET ORAL at 00:02

## 2024-07-16 RX ADMIN — OXYCODONE HYDROCHLORIDE 5 MG: 5 TABLET ORAL at 09:05

## 2024-07-16 RX ADMIN — HYDRALAZINE HYDROCHLORIDE 10 MG: 10 TABLET, FILM COATED ORAL at 21:18

## 2024-07-16 RX ADMIN — HEPARIN SODIUM 27 UNITS/KG/HR: 10000 INJECTION, SOLUTION INTRAVENOUS at 22:22

## 2024-07-16 RX ADMIN — OXYCODONE HYDROCHLORIDE 10 MG: 10 TABLET ORAL at 10:37

## 2024-07-16 RX ADMIN — NYSTATIN: 100000 POWDER TOPICAL at 10:24

## 2024-07-16 RX ADMIN — CARVEDILOL 6.25 MG: 6.25 TABLET, FILM COATED ORAL at 09:09

## 2024-07-16 RX ADMIN — SEVELAMER HYDROCHLORIDE 800 MG: 800 TABLET ORAL at 16:31

## 2024-07-16 NOTE — PLAN OF CARE
Problem: Potential for Falls  Goal: Patient will remain free of falls  Description: INTERVENTIONS:  - Educate patient/family on patient safety including physical limitations  - Instruct patient to call for assistance with activity   - Consult OT/PT to assist with strengthening/mobility   - Keep Call bell within reach  - Keep bed low and locked with side rails adjusted as appropriate  - Keep care items and personal belongings within reach  - Initiate and maintain comfort rounds  - Make Fall Risk Sign visible to staff  - Offer Toileting every 2 Hours, in advance of need  - Initiate/Maintain bed and chair alarm  - Obtain necessary fall risk management equipment:   - Apply yellow socks and bracelet for high fall risk patients  - Consider moving patient to room near nurses station  Outcome: Progressing     Problem: METABOLIC, FLUID AND ELECTROLYTES - ADULT  Goal: Electrolytes maintained within normal limits  Description: INTERVENTIONS:  - Monitor labs and assess patient for signs and symptoms of electrolyte imbalances  - Administer electrolyte replacement as ordered  - Monitor response to electrolyte replacements, including repeat lab results as appropriate  - Instruct patient on fluid and nutrition as appropriate  Outcome: Progressing  Goal: Fluid balance maintained  Description: INTERVENTIONS:  - Monitor labs   - Monitor I/O and WT  - Instruct patient on fluid and nutrition as appropriate  - Assess for signs & symptoms of volume excess or deficit  Outcome: Progressing     Problem: Prexisting or High Potential for Compromised Skin Integrity  Goal: Skin integrity is maintained or improved  Description: INTERVENTIONS:  - Identify patients at risk for skin breakdown  - Assess and monitor skin integrity  - Assess and monitor nutrition and hydration status  - Monitor labs   - Assess for incontinence   - Turn and reposition patient  - Assist with mobility/ambulation  - Relieve pressure over bony prominences  - Avoid  friction and shearing  - Provide appropriate hygiene as needed including keeping skin clean and dry  - Evaluate need for skin moisturizer/barrier cream  - Collaborate with interdisciplinary team   - Patient/family teaching  - Consider wound care consult   Outcome: Progressing     Problem: Nutrition/Hydration-ADULT  Goal: Nutrient/Hydration intake appropriate for improving, restoring or maintaining nutritional needs  Description: Monitor and assess patient's nutrition/hydration status for malnutrition. Collaborate with interdisciplinary team and initiate plan and interventions as ordered.  Monitor patient's weight and dietary intake as ordered or per policy. Utilize nutrition screening tool and intervene as necessary. Determine patient's food preferences and provide high-protein, high-caloric foods as appropriate.     INTERVENTIONS:  - Monitor oral intake, urinary output, labs, and treatment plans  - Assess nutrition and hydration status and recommend course of action  - Evaluate amount of meals eaten  - Assist patient with eating if necessary   - Allow adequate time for meals  - Recommend/ encourage appropriate diets, oral nutritional supplements, and vitamin/mineral supplements  - Order, calculate, and assess calorie counts as needed  - Recommend, monitor, and adjust tube feedings and TPN/PPN based on assessed needs  - Assess need for intravenous fluids  - Provide specific nutrition/hydration education as appropriate  - Include patient/family/caregiver in decisions related to nutrition  Outcome: Progressing     Problem: PAIN - ADULT  Goal: Verbalizes/displays adequate comfort level or baseline comfort level  Description: Interventions:  - Encourage patient to monitor pain and request assistance  - Assess pain using appropriate pain scale  - Administer analgesics based on type and severity of pain and evaluate response  - Implement non-pharmacological measures as appropriate and evaluate response  - Consider  cultural and social influences on pain and pain management  - Notify physician/advanced practitioner if interventions unsuccessful or patient reports new pain  Outcome: Progressing     Problem: INFECTION - ADULT  Goal: Absence or prevention of progression during hospitalization  Description: INTERVENTIONS:  - Assess and monitor for signs and symptoms of infection  - Monitor lab/diagnostic results  - Monitor all insertion sites, i.e. indwelling lines, tubes, and drains  - Monitor endotracheal if appropriate and nasal secretions for changes in amount and color  - [place] appropriate cooling/warming therapies per order  - Administer medications as ordered  - Instruct and encourage patient and family to use good hand hygiene technique  - Identify and instruct in appropriate isolation precautions for identified infection/condition  Outcome: Progressing  Goal: Absence of fever/infection during neutropenic period  Description: INTERVENTIONS:  - Monitor WBC    Outcome: Progressing     Problem: SAFETY ADULT  Goal: Patient will remain free of falls  Description: INTERVENTIONS:  - Educate patient/family on patient safety including physical limitations  - Instruct patient to call for assistance with activity   - Consult OT/PT to assist with strengthening/mobility   - Keep Call bell within reach  - Keep bed low and locked with side rails adjusted as appropriate  - Keep care items and personal belongings within reach  - Initiate and maintain comfort rounds  - Make Fall Risk Sign visible to staff  - Offer Toileting every 2 Hours, in advance of need  - Initiate/Maintain bed and chair alarm  - Obtain necessary fall risk management equipment:   - Apply yellow socks and bracelet for high fall risk patients  - Consider moving patient to room near nurses station  Outcome: Progressing  Goal: Maintain or return to baseline ADL function  Description: INTERVENTIONS:  -  Assess patient's ability to carry out ADLs; assess patient's baseline  for ADL function and identify physical deficits which impact ability to perform ADLs (bathing, care of mouth/teeth, toileting, grooming, dressing, etc.)  - Assess/evaluate cause of self-care deficits   - Assess range of motion  - Assess patient's mobility; develop plan if impaired  - Assess patient's need for assistive devices and provide as appropriate  - Encourage maximum independence but intervene and supervise when necessary  - Involve family in performance of ADLs  - Assess for home care needs following discharge   - Consider OT consult to assist with ADL evaluation and planning for discharge  - Provide patient education as appropriate  Outcome: Progressing  Goal: Maintains/Returns to pre admission functional level  Description: INTERVENTIONS:  - Perform AM-PAC 6 Click Basic Mobility/ Daily Activity assessment daily.  - Set and communicate daily mobility goal to care team and patient/family/caregiver.   - Collaborate with rehabilitation services on mobility goals if consulted  - Perform Range of Motion 3 times a day.  - Reposition patient every 2 hours.  - Dangle patient 3 times a day  - Stand patient 3 times a day  - Ambulate patient 3 times a day  - Out of bed to chair 3 times a day   - Out of bed for meals 3 times a day  - Out of bed for toileting  - Record patient progress and toleration of activity level   Outcome: Progressing     Problem: DISCHARGE PLANNING  Goal: Discharge to home or other facility with appropriate resources  Description: INTERVENTIONS:  - Identify barriers to discharge w/patient and caregiver  - Arrange for needed discharge resources and transportation as appropriate  - Identify discharge learning needs (meds, wound care, etc.)  - Arrange for interpretive services to assist at discharge as needed  - Refer to Case Management Department for coordinating discharge planning if the patient needs post-hospital services based on physician/advanced practitioner order or complex needs related to  functional status, cognitive ability, or social support system  Outcome: Progressing     Problem: Knowledge Deficit  Goal: Patient/family/caregiver demonstrates understanding of disease process, treatment plan, medications, and discharge instructions  Description: Complete learning assessment and assess knowledge base.  Interventions:  - Provide teaching at level of understanding  - Provide teaching via preferred learning methods  Outcome: Progressing

## 2024-07-16 NOTE — PROGRESS NOTES
[facility]  Progress Note  Name: Joaquin Frankel I  MRN: [id number]  Unit/Bed#: S -01 I Date of Admission: 7/4/2024   Date of Service: 7/16/2024 I Hospital Day: 12    Assessment & Plan   * Chest pain  Assessment & Plan  Patient presented with worsening substernal chest pain over several weeks.  H/o chronic angina, CAD, family history of hereditary heart disease on fathers side.   Patient was admitted for similar chest pain in early June and was found to have ESRD, started on HD MWF.  Recent nuclear stress test without significant reversible ischemia  EKG on admission: Sinus Rhythm with PAC's. Repeat EKG showed sinus bradycardia  High-sensitivity troponin negative x 5   . D-Dimer 4.21  CTA: subsegmental right lower lobe pulmonary embolism  07/05/24: Updated echocardiogram on unremarkable  Patient blood pressure elevated on admission to 194/80, currently stable.  Patient received 2 doses of nitroglycerin in ED and reports it did not improve his pain, but it improved his elevated blood pressure.  Patient was saturating low 80s on room air on admission and required 3L supplemental O2, which improved saturation to 90s. O2 Sat currently 94% to 95% on room air.  MRSA negative. Viral panel negative.  Multifactorial from acute on chronic anemia, hypertensive urgency, and acute PE   Patient's chest pain has resolved following hemodialysis and the improvement of high blood pressure.   7/9: Telemetry discontinued    Plan-  Per cardiology recs: Continue amlodipine 5 mg daily,Coreg 6.25 mg BID, Imdur 60 mg daily, atorvastatin 80 mg daily. Holding aspirin 81 mg daily    Pulmonary embolism (HCC)  Assessment & Plan  Patient presented for worsening chest pain, shortness of breath, and dyspnea on exertion.  Patient was admitted for similar chest pain in early June and was found to have ESRD, started on HD MWF   D-Dimer 4.21  CTA showed subsegmental right lower lobe pulmonary  embolism  Placed on heparin drip  7/12/24 Heparin was subtherapeutic this morning, PTT 55. No bolus was given or increase in gtt as patient will have the gtt stopped at 10 am pending IR procedure.    Plan  Resume heparin  Transition to warfarin 5 mg daily after renal biopsy   Supplemental oxygen as needed    ESRD (end stage renal disease) on dialysis ([facility])  Assessment & Plan  Lab Results   Component Value Date    EGFR 7 07/16/2024    EGFR 5 07/15/2024    EGFR 8 07/13/2024    CREATININE 8.00 (H) 07/16/2024    CREATININE 10.06 (H) 07/15/2024    CREATININE 7.33 (H) 07/13/2024     Patient ESRD on Dialysis 3 times a week, MWF  Elevated , c/o of orthopnea   Etiology of CLOVIS: Etiology not entirely clear - Possible MGRS? Pattern of disease progression is not typical for diabetic nephropathy.   Etiology of CKD: Suspected due to diabetic nephropathy, hypertensive nephrosclerosis, obesity related renal dysfunction  Previous baseline creatinine 1.3-1.6 w/ prior episodes of CLOVIS  Serologic workup negative except for IgG kappa on SPEP and UPEP.  Cryoglobulin negative, ANCA negative, anti-dsDNA negative, anti-GBM negative, JOSE ROBERTO negative, C4 34, C3 118  Bone marrow biopsy 6/12/2024: negative for plasma cell neoplasm.   CRRT initiated 6/7/2024.  Peak creatinine 10.4  Last dialyzed 7/15/2024 removed 4 L  Dry weight decreased to 172.5 kg   electrolytes and acid/base stable  7/12 renal biopsy was not performed due to hypertension despite sedation and antihypertensive medications. Rescheduled for Monday 7/15/24    7/15 renal biopsy was not completed due to oxygen saturation of 76-89% during dialysis. Repeat CXR is unchanged from previous image. Heparin has remained therapeutic. Patient denying CP, SOB, new lower extremity swelling, cough, calf tenderness, fever, chills. Oxygen desaturation likely due to ANGY as patient has previous overnight pulse oximetry documenting multiple desaturations throughout the night and pt states he was  sleeping during the timeframe of documented vitals.     Plan:  Nephrology onboard  Resume heparin  Hold oral AC. Resume ASA 81 mg daily after renal biopsy   HD per nephro.  Continue torsemide 100 mg daily    Hypertension  Assessment & Plan  History of HTN, recent medication change after dx of ESRD  Workup at that time notable for IgG kappa monoclonal gammopathy and s/p bone marrow biopsy on 6/12/2023 to assess for multiple myeloma. Bone marrow biopsy with no evidence of plasma cell neoplasm.   HD on MWF schedule.   Presented to ED with uncontrolled /80. Endorsed headaches and blurry vision in his left eye in the ED that has since resolved.  BP improved    Plan:  Continue amlodipine 10 mg daily, carvedilol 6.25 mg twice daily, Imdur 60 mg once daily, torsemide 100 mg daily  Hold carvedilol if HR <50  Nephrology and cardiology onbooard   Continue UF with dialysis as BP tolerates     Type 2 diabetes mellitus (HCC)  Assessment & Plan  Lab Results   Component Value Date    HGBA1C 5.7 (H) 06/09/2024       Recent Labs     07/15/24  1146 07/15/24  2119 07/16/24  0804 07/16/24  1119   POCGLU 80 125 87 107       Blood Sugar Average: Last 72 hrs:  (P) [id number]      Anemia  Assessment & Plan    Hemoglobin   Date Value Ref Range Status   07/16/2024 8.7 (L) 12.0 - 17.0 g/dL Final   01/23/2017 14.5 12.6 - 17.7 g/dL Final     Hematocrit   Date Value Ref Range Status   07/16/2024 27.3 (L) 36.5 - 49.3 % Final   01/23/2017 43.8 37.5 - 51.0 % Final       Patient has history of anemia of chronic disease, ESRD  CBC on admission showed hgb 6.6 / hct 21.2  Received 2 units packed RBC's in ED. Hbg 7.6 after transfusion  Hematology following with outpatient f/u scheduled 7/11/2024, patient will reschedule due to overlapping hospital admission   On oral iron twice daily  No active bleeding, on heparin for PE, will switch to oral AC after renal biopsy procedure.  7/8/2024 Patient was transfused 1 unit of blood during  hemodialysis.  Hemoglobin 8.8, hematocrit 27.1  S/P 3 units packed RBCs  Start IV Venofer 100 mg with each dialysis treatment for total 10 doses starting 7/10/24     Plan:  Continue to monitor hemoglobin levels  Transfuse for Hgb <8.0    Hyponatremia  Assessment & Plan  Lab Results   Component Value Date/Time    SODIUM 131 (L) 07/16/2024 05:49 AM     Likely due to fluid overload.     Plan:  Per nephro, fluid restriction to 1.5 L/day  Continue with hemodialysis  F/u BMP in AM    ANGY (obstructive sleep apnea)  Assessment & Plan  Overnight pulse oximetry obtained: highest O2 sat 99%, his lowest O2 sat 73%, with a mean of 91%. Patient had 120 desaturation events <89%.  He spent an 1 hour and 15 minutes in desaturation (~15.82%) and and 1 hour and 34 minutes with desaturation <89% (~19.77%).  Counseled patient on importance of maintaining oxygen saturation as oxygen deprivation can lead to hypertension, heart failure, coronary artery disease, pulmonary hypertension, etc.    Patient was advised of need to wear oxygen at night upon discharge. He declines stating he would like to wait for formal evaluation via sleep study as an outpatient.    Chronic acquired lymphedema  Assessment & Plan  Chronic LLE lymphedema x 3 years after surgical resection of LLE cyst    Plan:  Advised compression and elevation of lower extremity for symptomatic edema relief    Acute respiratory failure with hypoxia (HCC)-resolved as of 7/6/2024  Assessment & Plan  Patient O2 saturation on admission was 83% on RA  Was placed on 3/L Nasal canula FiO2 32%  CTA showed right lower lobe subsegmental PE  Was placed on heparin    Plan:  Supplemental oxygen as needed  Continue heparin for PE  Incentive Spirometry           VTE Pharmacologic Prophylaxis: VTE Score: 7 High Risk (Score >/= 5) - Pharmacological DVT Prophylaxis Ordered: heparin drip. Sequential Compression Devices Ordered.    Mobility:   Basic Mobility Inpatient Raw Score: 24  [facility] Goal: 8:  Walk 250 feet or more  JH-HLM Achieved: 4: Move to chair/commode  JH-HLM Goal achieved. Continue to encourage appropriate mobility.    Patient Centered Rounds: I performed bedside rounds with nursing staff today.   Discussions with Specialists or Other Care Team Provider: cardiology, nephrology    Education and Discussions with Family / Patient: Patient declined call to .     Current Length of Stay: 12 day(s)  Current Patient Status: Inpatient   Discharge Plan: Anticipate discharge in 48-72 hrs to home.    Code Status: Level 1 - Full Code    Subjective:  Patient was seen and evaluated at bedside.  Patient's renal biopsy was not completed yesterday due to oxygen desaturations 76% - 89% during dialysis.  Patient states he was sleeping for most of the session. He denies chest pain, shortness of breath, new lower extremity swelling, cough, calf tenderness fever, chills. Patient was once again counseled on the importance of maintaining his oxygen saturation.  He agrees to wear supplemental oxygen at this time.  He does report 1 episode of nausea and vomiting that was mostly water this afternoon.  Patient denies abdominal pain, feeling nauseous currently.  Declines antiemetic at this time.    Objective:     Vitals:   Temp (24hrs), Av.3 °F (36.8 °C), Min:98 °F (36.7 °C), Max:98.5 °F (36.9 °C)    Temp:  [98 °F (36.7 °C)-98.5 °F (36.9 °C)] 98.5 °F (36.9 °C)  HR:  [56-59] 59  Resp:  [16] 16  BP: (120-163)/(62-94) 120/62  SpO2:  [92 %-94 %] 92 %  Body mass index is 59.11 kg/m².     Input and Output Summary (last 24 hours):     Intake/Output Summary (Last 24 hours) at 2024 1422  Last data filed at 7/15/2024 2300  Gross per 24 hour   Intake 0 ml   Output 0 ml   Net 0 ml       Physical Exam:   Physical Exam  Vitals and nursing note reviewed.   Constitutional:       General: He is not in acute distress.     Appearance: He is obese. He is not ill-appearing.   HENT:      Head: Normocephalic and atraumatic.       Right Ear: External ear normal.      Left Ear: External ear normal.      Nose: Nose normal.      Mouth/Throat:      Mouth: Mucous membranes are moist.      Pharynx: Oropharynx is clear.   Eyes:      Extraocular Movements: Extraocular movements intact.      Conjunctiva/sclera: Conjunctivae normal.   Cardiovascular:      Rate and Rhythm: Normal rate and regular rhythm.      Pulses: Normal pulses.      Heart sounds: Normal heart sounds. No murmur heard.     No gallop.   Pulmonary:      Effort: Pulmonary effort is normal. No respiratory distress.      Breath sounds: Normal breath sounds. No wheezing.      Comments: On room air  Abdominal:      General: Bowel sounds are normal. There is no distension.      Palpations: Abdomen is soft.      Tenderness: There is no abdominal tenderness.      Comments: Lymphedema noted to the left lower pannus  Skin under pannus appears irritated with some areas of maceration.  There is also a small draining wound present.   Musculoskeletal:         General: Normal range of motion.      Cervical back: Normal range of motion and neck supple. No rigidity.      Right lower leg: Edema (violaceous skin changes right lower extremity with healing punctate wounds over shin) present.      Left lower leg: Edema (lymphedema) present.      Comments: LLE significantly larger than RLE due to chronic lymphedema. Healed surgical incision noted to left calf.    Skin:     General: Skin is warm and dry.      Comments: R IJV PermCath site clear. Dressing intact.   Neurological:      General: No focal deficit present.      Mental Status: He is alert and oriented to person, place, and time. Mental status is at baseline.   Psychiatric:         Mood and Affect: Mood normal.          Additional Data:     Labs:  Results from last 7 days   Lab Units 07/16/24  0549 07/13/24  0745 07/12/24  0549   WBC Thousand/uL 5.84   < > 6.08   HEMOGLOBIN g/dL 8.7*   < > 8.1*   HEMATOCRIT % 27.3*   < > 25.9*   PLATELETS  Thousands/uL 175   < > 239   SEGS PCT %  --   --  65   LYMPHO PCT %  --   --  16   MONO PCT %  --   --  10   EOS PCT %  --   --  7*    < > = values in this interval not displayed.     Results from last 7 days   Lab Units 07/16/24  0549   SODIUM mmol/L 131*   POTASSIUM mmol/L 4.3   CHLORIDE mmol/L 95*   CO2 mmol/L 30   BUN mg/dL 32*   CREATININE mg/dL 8.00*   ANION GAP mmol/L 6   CALCIUM mg/dL 8.8   GLUCOSE RANDOM mg/dL 89     Results from last 7 days   Lab Units 07/12/24  1623   INR  1.06       Results from last 7 days   Lab Units 07/16/24  1119 07/16/24  0804 07/15/24  2119 07/15/24  1146 07/15/24  0819 07/14/24  2125 07/14/24  1712 07/14/24  1116 07/14/24  0748 07/13/24  2052 07/13/24  1606 07/13/24  1145   POC GLUCOSE mg/dl 107 87 125 80 83 129 112 104 89 117 126 103               Lines/Drains:  Invasive Devices       Peripheral Intravenous Line  Duration             Peripheral IV 07/14/24 Left;Medial Forearm 1 day              Hemodialysis Catheter  Duration             HD Permanent Double Catheter 31 days                    Imaging: Reviewed radiology reports from this admission including: chest xray, abdominal/pelvic CT, and ECHO    Recent Cultures (last 7 days):         Last 24 Hours Medication List:   Current Facility-Administered Medications   Medication Dose Route Frequency Provider Last Rate    acetaminophen  650 mg Oral Q6H PRN Mily Martin MD      amLODIPine  10 mg Oral Daily Andrea Longoria MD      atorvastatin  80 mg Oral Daily Mily Martin MD      carvedilol  6.25 mg Oral BID With Meals Andrea Longoria MD      heparin (porcine)  3-30 Units/kg/hr (Order-Specific) Intravenous Titrated Lorri Maria MD 27 Units/kg/hr (07/16/24 1419)    heparin (porcine)  10,000 Units Intravenous Q6H PRN Lorri Maria MD      heparin (porcine)  5,000 Units Intravenous Q6H PRN Lorri Maria MD      hydrALAZINE  10 mg Oral Q8H [facility] Kriss Vealzco MD      iron sucrose  100 mg Intravenous After Dialysis Johnny Viramontes MD  Stopped (07/15/24 1311)    isosorbide mononitrate  60 mg Oral Daily Mily Martin MD      labetalol  10 mg Intravenous Q6H PRN Liana Riley MD      lidocaine  1 patch Topical Daily Mily Martin MD      nystatin   Topical BID Lorri Maria MD      ondansetron  4 mg Intravenous Once Cindy Balderas MD      oxyCODONE  10 mg Oral Q4H PRN Lorri Maria MD      oxyCODONE  5 mg Oral Q4H PRN Lorri Maria MD      polyethylene glycol  17 g Oral Daily PRN Diane Oliveira MD      sevelamer  800 mg Oral TID With Meals Mily Martin MD      torsemide  100 mg Oral Daily Charles Spear MD          Today, Patient Was Seen By: Deya Palomino MD    **Please Note: This note may have been constructed using a voice recognition system.**

## 2024-07-16 NOTE — PROGRESS NOTES
NEPHROLOGY HOSPITAL PROGRESS NOTE   Joaquin Frankel 44 y.o. male MRN: [id number]  Unit/Bed#: S -01 Encounter: [id number]  Reason for Consult: CLOVIS on CKD now requiring dialysis since June 7    ASSESSMENT and PLAN:    44-year-old male with a past medical history of CKD stage III with acute kidney injury on prior admission in June started on hemodialysis June 7, 2024, hypertension, monoclonal gammopathy, who is now presenting to the hospital early July with chest pain and concern for PE on admission.  Nephrology is on board for CLOVIS requiring dialysis     1-acute kidney injury currently on dialysis-present on admission.  Underlying CKD stage III.  Baseline creatinine 1.2 to 1/dL     - Had CLOVIS started on dialysis June 7, 2024  - Outpatient unit [facility]  - Access-right IJ PermCath  - Etiology-unclear.  MGR S versus other  - Does not have signs of renal recovery as of yet  - Noted attempted biopsy last week but unsuccessful due to respiratory status.  Therefore patient is being planned for renal biopsy July 15 with anesthesia present  Which was not able to be completed due to her respiratory status with hypoxia  - Prior bone marrow biopsy June 12 did not reveal a plasma cell neoplasm  - July 15 15th-dialysis completed with 4 L ultrafiltration.  Biopsy cannot be completed for renal biopsy due to hypoxia  - July 16-no urgent indication for dialysis we will plan for dialysis on July 17     Plan  - Workup for hypoxia in progress.  May be related to his severe ANGY/hypoventilation syndrome as patient became hypoxic yesterday during dialysis but he was sleeping through treatment and when he became hypoxic.  When he is awake his oxygen saturations are high 90s on room air  - Heparin drip-management per primary team  - Will need coordination of heparin drip with IR team once biopsy is planned again later this week  - Next dialysis tomorrow from renal standpoint  - Reviewed case with primary team attending and  "residents and we are in agreement for next dialysis tomorrow rest of renal plan.  Hypoxia workup in progress per primary team  - Monitoring dialysis catheter site for now  - Postbiopsy will need CBC  - Add hydralazine due to continued hypertension but also volume component and we will continue to ultrafiltrate  - Trend lab work for now  - Will need adjustment of dry weight as we continue to ultrafiltrate the patient  - I reviewed the above with the patient in detail and he is agreeable with the renal plans     Portions of the record may have been created with voice recognition software. Occasional wrong word or \"sound a like\" substitutions may have occurred due to the inherent limitations of voice recognition software. Read the chart carefully and recognize, using context, where substitutions have occurred.If you have any questions, please contact the dictating provider.        2-tunneled dialysis catheter site-very minimal erythema noted at insertion site.  Will plan to monitor for now.  This site appears unchanged from when I saw the patient at [facility].  Unchanged     3-electrolytes-monitor with dialysis-appropriate for now with the exception of borderline hyponatremia in the setting of volume overload which will monitor for now     4-acid/base-monitor with dialysis.  Appropriate for now     5-chest pain-resolved.  With concern for underlying coronary artery disease being medically managed for now with cardiology team     6-volume overload-ultrafiltrate with dialysis as tolerates.     - On torsemide 100 mg daily     7-anemia-no JOSSELYN due to acute PE.  Hemoglobin at goal for biopsy above 8.     On IV iron     8-nephrotic range proteinuria-plan for biopsy today     - Serological workup negative except for IgG kappa on SPEP and UPEP     9-hypertension-on amlodipine, carvedilol, torsemide.  Not able to increase carvedilol due to bradycardia    - Add hydralazine to her regimen July 16       SUBJECTIVE / 24H " INTERVAL HISTORY:    Patient currently denies shortness of breath.  Blood pressure is elevated.    OBJECTIVE:  Current Weight: Weight - Scale: (!) 171 kg (377 lb 6.8 oz)  Vitals:    07/15/24 2121 07/16/24 0531 07/16/24 0805 07/16/24 0909   BP: 159/72  159/94 163/79   BP Location:   Left arm    Pulse: 57  56 59   Resp:   16    Temp: 98 °F (36.7 °C)      TempSrc:       SpO2: 94%  92%    Weight:  (!) 171 kg (377 lb 6.8 oz)     Height:           Intake/Output Summary (Last 24 hours) at 7/16/2024 1003  Last data filed at 7/15/2024 2300  Gross per 24 hour   Intake 300 ml   Output 4503 ml   Net -4203 ml     General: NAD  Skin: no rash  Eyes: anicteric sclera  ENT: moist mucous membrane  Neck: supple  Chest: CTA b/l, no ronchii, no wheeze, no rubs, no rales  CVS: s1s2, no murmur, no gallop, no rub  Abdomen: soft, nontender, nl sounds  Extremities: 4+,  edema LE b/l unchanged, tunneled catheter site without drainage.  Very minimal erythema, unchanged  : no lewis  Neuro: AAOX3  Psych: normal affect    Medications:    Current Facility-Administered Medications:     acetaminophen (TYLENOL) tablet 650 mg, 650 mg, Oral, Q6H PRN, Mily Martin MD, 650 mg at 07/11/24 1950    amLODIPine (NORVASC) tablet 10 mg, 10 mg, Oral, Daily, Andrea Longoria MD, 10 mg at 07/16/24 0910    atorvastatin (LIPITOR) tablet 80 mg, 80 mg, Oral, Daily, Mily Martin MD, 80 mg at 07/16/24 0910    carvedilol (COREG) tablet 6.25 mg, 6.25 mg, Oral, BID With Meals, Andrea Longoria MD, 6.25 mg at 07/16/24 0909    heparin (porcine) 25,000 units in 0.45% NaCl 250 mL infusion (premix), 3-30 Units/kg/hr (Order-Specific), Intravenous, Titrated, Lorri Maria MD, Last Rate: 33.8 mL/hr at 07/16/24 0629, 27 Units/kg/hr at 07/16/24 0629    heparin (porcine) injection 10,000 Units, 10,000 Units, Intravenous, Q6H PRN, Lorri Maria MD    heparin (porcine) injection 5,000 Units, 5,000 Units, Intravenous, Q6H PRN, Lorri Maria MD    hydrALAZINE (APRESOLINE) tablet 10 mg, 10 mg,  Oral, Q8H SHAZIA, Kriss Velazco MD    iron sucrose (VENOFER) 100 mg in sodium chloride 0.9 % 100 mL IVPB, 100 mg, Intravenous, After Dialysis, Johnny Viramontes MD, Stopped at 07/15/24 1311    isosorbide mononitrate (IMDUR) 24 hr tablet 60 mg, 60 mg, Oral, Daily, Mily Martin MD, 60 mg at 07/16/24 0910    labetalol (NORMODYNE) injection 10 mg, 10 mg, Intravenous, Q6H PRN, Liana Riley MD    lidocaine (LIDODERM) 5 % patch 1 patch, 1 patch, Topical, Daily, Mily Martin MD    magnesium sulfate 2 g/50 mL IVPB (premix) 2 g, 2 g, Intravenous, Once, Lorri Maria MD    nystatin (MYCOSTATIN) powder, , Topical, BID, Lorri Maria MD, Given at 07/14/24 1610    ondansetron (ZOFRAN) injection 4 mg, 4 mg, Intravenous, Once, Cindy Balderas MD    oxyCODONE (ROXICODONE) immediate release tablet 10 mg, 10 mg, Oral, Q4H PRN, Lorri Maria MD, 10 mg at 07/16/24 0507    oxyCODONE (ROXICODONE) IR tablet 5 mg, 5 mg, Oral, Q4H PRN, Lorri Maria MD, 5 mg at 07/16/24 0905    polyethylene glycol (MIRALAX) packet 17 g, 17 g, Oral, Daily PRN, Diane Oliveira MD    sevelamer (RENAGEL) tablet 800 mg, 800 mg, Oral, TID With Meals, Mily Martin MD, 800 mg at 07/16/24 0910    torsemide (DEMADEX) tablet 100 mg, 100 mg, Oral, Daily, Charles Spear MD, 100 mg at 07/16/24 0910    Laboratory Results:  Results from last 7 days   Lab Units 07/16/24  0549 07/15/24  0459 07/13/24  0745 07/12/24  0549 07/11/24  0925 07/10/24  0517   WBC Thousand/uL 5.84 6.60 5.99 6.08 6.48 6.53   HEMOGLOBIN g/dL 8.7* 8.3* 9.4* 8.1* 9.1* 8.1*   HEMATOCRIT % 27.3* 26.6* 30.0* 25.9* 28.9* 25.9*   PLATELETS Thousands/uL 175 236 261 239 241 233   POTASSIUM mmol/L 4.3 4.6 4.4 4.3 4.0 4.3   CHLORIDE mmol/L 95* 96 95* 95* 94* 96   CO2 mmol/L 30 27 27 27 28 28   BUN mg/dL 32* 46* 27* 39* 32* 41*   CREATININE mg/dL 8.00* 10.06* 7.33* 8.84* 8.13* 9.56*   CALCIUM mg/dL 8.8 9.1 9.3 9.0 9.2 9.1   MAGNESIUM mg/dL 1.8*  --   --   --   --   --    PHOSPHORUS mg/dL 4.3  --    --   --   --   --

## 2024-07-17 ENCOUNTER — APPOINTMENT (INPATIENT)
Dept: DIALYSIS | Facility: HOSPITAL | Age: 44
DRG: 175 | End: 2024-07-17
Payer: COMMERCIAL

## 2024-07-17 LAB
ANION GAP SERPL CALCULATED.3IONS-SCNC: 8 MMOL/L (ref 4–13)
APTT PPP: 103 SECONDS (ref 23–37)
APTT PPP: 59 SECONDS (ref 23–37)
APTT PPP: 69 SECONDS (ref 23–37)
BUN SERPL-MCNC: 40 MG/DL (ref 5–25)
CALCIUM SERPL-MCNC: 9.1 MG/DL (ref 8.4–10.2)
CHLORIDE SERPL-SCNC: 93 MMOL/L (ref 96–108)
CO2 SERPL-SCNC: 30 MMOL/L (ref 21–32)
CREAT SERPL-MCNC: 9.55 MG/DL (ref 0.6–1.3)
ERYTHROCYTE [DISTWIDTH] IN BLOOD BY AUTOMATED COUNT: 13.7 % (ref 11.6–15.1)
GFR SERPL CREATININE-BSD FRML MDRD: 5 ML/MIN/1.73SQ M
GLUCOSE SERPL-MCNC: 118 MG/DL (ref 65–140)
GLUCOSE SERPL-MCNC: 120 MG/DL (ref 65–140)
GLUCOSE SERPL-MCNC: 84 MG/DL (ref 65–140)
GLUCOSE SERPL-MCNC: 89 MG/DL (ref 65–140)
GLUCOSE SERPL-MCNC: 99 MG/DL (ref 65–140)
HCT VFR BLD AUTO: 29 % (ref 36.5–49.3)
HGB BLD-MCNC: 9.1 G/DL (ref 12–17)
MAGNESIUM SERPL-MCNC: 2.1 MG/DL (ref 1.9–2.7)
MCH RBC QN AUTO: 29.1 PG (ref 26.8–34.3)
MCHC RBC AUTO-ENTMCNC: 31.4 G/DL (ref 31.4–37.4)
MCV RBC AUTO: 93 FL (ref 82–98)
PHOSPHATE SERPL-MCNC: 5.4 MG/DL (ref 2.7–4.5)
PLATELET # BLD AUTO: 201 THOUSANDS/UL (ref 149–390)
PMV BLD AUTO: 9 FL (ref 8.9–12.7)
POTASSIUM SERPL-SCNC: 4.4 MMOL/L (ref 3.5–5.3)
RBC # BLD AUTO: 3.13 MILLION/UL (ref 3.88–5.62)
SODIUM SERPL-SCNC: 131 MMOL/L (ref 135–147)
WBC # BLD AUTO: 5.69 THOUSAND/UL (ref 4.31–10.16)

## 2024-07-17 PROCEDURE — 85027 COMPLETE CBC AUTOMATED: CPT | Performed by: INTERNAL MEDICINE

## 2024-07-17 PROCEDURE — 84100 ASSAY OF PHOSPHORUS: CPT | Performed by: INTERNAL MEDICINE

## 2024-07-17 PROCEDURE — 99232 SBSQ HOSP IP/OBS MODERATE 35: CPT | Performed by: INTERNAL MEDICINE

## 2024-07-17 PROCEDURE — 85730 THROMBOPLASTIN TIME PARTIAL: CPT | Performed by: INTERNAL MEDICINE

## 2024-07-17 PROCEDURE — 83735 ASSAY OF MAGNESIUM: CPT | Performed by: INTERNAL MEDICINE

## 2024-07-17 PROCEDURE — 82948 REAGENT STRIP/BLOOD GLUCOSE: CPT

## 2024-07-17 PROCEDURE — 80048 BASIC METABOLIC PNL TOTAL CA: CPT | Performed by: INTERNAL MEDICINE

## 2024-07-17 RX ADMIN — HEPARIN SODIUM 5000 UNITS: 1000 INJECTION INTRAVENOUS; SUBCUTANEOUS at 09:07

## 2024-07-17 RX ADMIN — IRON SUCROSE 100 MG: 20 INJECTION, SOLUTION INTRAVENOUS at 13:58

## 2024-07-17 RX ADMIN — OXYCODONE HYDROCHLORIDE 5 MG: 5 TABLET ORAL at 00:37

## 2024-07-17 RX ADMIN — AMLODIPINE BESYLATE 10 MG: 10 TABLET ORAL at 08:38

## 2024-07-17 RX ADMIN — SEVELAMER HYDROCHLORIDE 800 MG: 800 TABLET ORAL at 11:21

## 2024-07-17 RX ADMIN — OXYCODONE HYDROCHLORIDE 5 MG: 5 TABLET ORAL at 19:58

## 2024-07-17 RX ADMIN — CARVEDILOL 6.25 MG: 6.25 TABLET, FILM COATED ORAL at 08:38

## 2024-07-17 RX ADMIN — ISOSORBIDE MONONITRATE 60 MG: 60 TABLET, EXTENDED RELEASE ORAL at 08:38

## 2024-07-17 RX ADMIN — HEPARIN SODIUM 29 UNITS/KG/HR: 10000 INJECTION, SOLUTION INTRAVENOUS at 13:56

## 2024-07-17 RX ADMIN — OXYCODONE HYDROCHLORIDE 10 MG: 10 TABLET ORAL at 07:59

## 2024-07-17 RX ADMIN — OXYCODONE HYDROCHLORIDE 5 MG: 5 TABLET ORAL at 11:21

## 2024-07-17 RX ADMIN — OXYCODONE HYDROCHLORIDE 10 MG: 10 TABLET ORAL at 21:37

## 2024-07-17 RX ADMIN — NYSTATIN: 100000 POWDER TOPICAL at 08:39

## 2024-07-17 RX ADMIN — HEPARIN SODIUM 27 UNITS/KG/HR: 10000 INJECTION, SOLUTION INTRAVENOUS at 06:27

## 2024-07-17 RX ADMIN — OXYCODONE HYDROCHLORIDE 10 MG: 10 TABLET ORAL at 16:32

## 2024-07-17 RX ADMIN — ATORVASTATIN CALCIUM 80 MG: 40 TABLET, FILM COATED ORAL at 08:38

## 2024-07-17 RX ADMIN — OXYCODONE HYDROCHLORIDE 5 MG: 5 TABLET ORAL at 15:22

## 2024-07-17 RX ADMIN — HEPARIN SODIUM 27 UNITS/KG/HR: 10000 INJECTION, SOLUTION INTRAVENOUS at 21:37

## 2024-07-17 RX ADMIN — HYDRALAZINE HYDROCHLORIDE 10 MG: 10 TABLET, FILM COATED ORAL at 21:37

## 2024-07-17 RX ADMIN — OXYCODONE HYDROCHLORIDE 10 MG: 10 TABLET ORAL at 02:17

## 2024-07-17 RX ADMIN — HYDRALAZINE HYDROCHLORIDE 10 MG: 10 TABLET, FILM COATED ORAL at 05:35

## 2024-07-17 RX ADMIN — SEVELAMER HYDROCHLORIDE 800 MG: 800 TABLET ORAL at 08:38

## 2024-07-17 RX ADMIN — OXYCODONE HYDROCHLORIDE 5 MG: 5 TABLET ORAL at 05:34

## 2024-07-17 RX ADMIN — CARVEDILOL 6.25 MG: 6.25 TABLET, FILM COATED ORAL at 16:33

## 2024-07-17 RX ADMIN — SEVELAMER HYDROCHLORIDE 800 MG: 800 TABLET ORAL at 16:33

## 2024-07-17 RX ADMIN — TORSEMIDE 100 MG: 100 TABLET ORAL at 08:38

## 2024-07-17 RX ADMIN — NYSTATIN: 100000 POWDER TOPICAL at 17:15

## 2024-07-17 NOTE — PLAN OF CARE
Target UF Goal 3.5 L as tolerated. Patient dialyzing for 3 hours on 3 K bath for serum K of  4.4  per protocol. Treatment plan reviewed with Nephrology.   Problem: METABOLIC, FLUID AND ELECTROLYTES - ADULT  Goal: Electrolytes maintained within normal limits  Description: INTERVENTIONS:  - Monitor labs and assess patient for signs and symptoms of electrolyte imbalances  - Administer electrolyte replacement as ordered  - Monitor response to electrolyte replacements, including repeat lab results as appropriate  - Instruct patient on fluid and nutrition as appropriate  Outcome: Progressing  Goal: Fluid balance maintained  Description: INTERVENTIONS:  - Monitor labs   - Monitor I/O and WT  - Instruct patient on fluid and nutrition as appropriate  - Assess for signs & symptoms of volume excess or deficit  Outcome: Progressing   Post-Dialysis RN Treatment Note    Blood Pressure:  Pre 131/74 mm/Hg  Post 159/74 mmHg   EDW  172.5 kg    Weight:  Pre 174.5 kg   Post 170.5 kg   Mode of weight measurement: Standing Scale   Volume Removed  3500 ml    Treatment duration 180 minutes    NS given  No    Treatment shortened? No   Medications given during Rx Venofer 100 mg   Estimated Kt/V  0.58   Access type: Permacath/TDC   Access Issues: No    Report called to primary nurse   Yes Maylin De La Garza RN

## 2024-07-17 NOTE — CASE MANAGEMENT
Case Management Progress Note    Patient name Joaquin Frankel  Location S /S -01 MRN [id number]  : 1980 Date 2024       LOS (days): 13  Geometric Mean LOS (GMLOS) (days): 4  Days to GMLOS:-8.7        OBJECTIVE:        Current admission status: Inpatient  Preferred Pharmacy:   [id number] - [place], [place] - [address] [address]  [address] [place] [id number]  Phone: [phone] Fax: [phone]    [facility] [address] [facility], [address] [place] [id number]  [facility], [address] [address]  Phone: [phone] Fax: [phone]    Primary Care Provider: Hany Mcfarland DO    Primary Insurance: BLUE CROSS  Secondary Insurance:     PROGRESS NOTE:  Patient discussed in weekly LOS meeting.  Assigned CM following for discharge needs: patient pending biopsy completion, not medically cleared.

## 2024-07-17 NOTE — PLAN OF CARE
Problem: Potential for Falls  Goal: Patient will remain free of falls  Description: INTERVENTIONS:  - Educate patient/family on patient safety including physical limitations  - Instruct patient to call for assistance with activity   - Consult OT/PT to assist with strengthening/mobility   - Keep Call bell within reach  - Keep bed low and locked with side rails adjusted as appropriate  - Keep care items and personal belongings within reach  - Initiate and maintain comfort rounds  - Make Fall Risk Sign visible to staff  - Offer Toileting every 2 Hours, in advance of need  - Initiate/Maintain bed and chair alarm  - Obtain necessary fall risk management equipment:   - Apply yellow socks and bracelet for high fall risk patients  - Consider moving patient to room near nurses station  Outcome: Progressing     Problem: METABOLIC, FLUID AND ELECTROLYTES - ADULT  Goal: Electrolytes maintained within normal limits  Description: INTERVENTIONS:  - Monitor labs and assess patient for signs and symptoms of electrolyte imbalances  - Administer electrolyte replacement as ordered  - Monitor response to electrolyte replacements, including repeat lab results as appropriate  - Instruct patient on fluid and nutrition as appropriate  Outcome: Progressing  Goal: Fluid balance maintained  Description: INTERVENTIONS:  - Monitor labs   - Monitor I/O and WT  - Instruct patient on fluid and nutrition as appropriate  - Assess for signs & symptoms of volume excess or deficit  Outcome: Progressing     Problem: Prexisting or High Potential for Compromised Skin Integrity  Goal: Skin integrity is maintained or improved  Description: INTERVENTIONS:  - Identify patients at risk for skin breakdown  - Assess and monitor skin integrity  - Assess and monitor nutrition and hydration status  - Monitor labs   - Assess for incontinence   - Turn and reposition patient  - Assist with mobility/ambulation  - Relieve pressure over bony prominences  - Avoid  friction and shearing  - Provide appropriate hygiene as needed including keeping skin clean and dry  - Evaluate need for skin moisturizer/barrier cream  - Collaborate with interdisciplinary team   - Patient/family teaching  - Consider wound care consult   Outcome: Progressing     Problem: Nutrition/Hydration-ADULT  Goal: Nutrient/Hydration intake appropriate for improving, restoring or maintaining nutritional needs  Description: Monitor and assess patient's nutrition/hydration status for malnutrition. Collaborate with interdisciplinary team and initiate plan and interventions as ordered.  Monitor patient's weight and dietary intake as ordered or per policy. Utilize nutrition screening tool and intervene as necessary. Determine patient's food preferences and provide high-protein, high-caloric foods as appropriate.     INTERVENTIONS:  - Monitor oral intake, urinary output, labs, and treatment plans  - Assess nutrition and hydration status and recommend course of action  - Evaluate amount of meals eaten  - Assist patient with eating if necessary   - Allow adequate time for meals  - Recommend/ encourage appropriate diets, oral nutritional supplements, and vitamin/mineral supplements  - Order, calculate, and assess calorie counts as needed  - Recommend, monitor, and adjust tube feedings and TPN/PPN based on assessed needs  - Assess need for intravenous fluids  - Provide specific nutrition/hydration education as appropriate  - Include patient/family/caregiver in decisions related to nutrition  Outcome: Progressing     Problem: PAIN - ADULT  Goal: Verbalizes/displays adequate comfort level or baseline comfort level  Description: Interventions:  - Encourage patient to monitor pain and request assistance  - Assess pain using appropriate pain scale  - Administer analgesics based on type and severity of pain and evaluate response  - Implement non-pharmacological measures as appropriate and evaluate response  - Consider  cultural and social influences on pain and pain management  - Notify physician/advanced practitioner if interventions unsuccessful or patient reports new pain  Outcome: Progressing     Problem: INFECTION - ADULT  Goal: Absence or prevention of progression during hospitalization  Description: INTERVENTIONS:  - Assess and monitor for signs and symptoms of infection  - Monitor lab/diagnostic results  - Monitor all insertion sites, i.e. indwelling lines, tubes, and drains  - Monitor endotracheal if appropriate and nasal secretions for changes in amount and color  - [place] appropriate cooling/warming therapies per order  - Administer medications as ordered  - Instruct and encourage patient and family to use good hand hygiene technique  - Identify and instruct in appropriate isolation precautions for identified infection/condition  Outcome: Progressing  Goal: Absence of fever/infection during neutropenic period  Description: INTERVENTIONS:  - Monitor WBC    Outcome: Progressing     Problem: SAFETY ADULT  Goal: Patient will remain free of falls  Description: INTERVENTIONS:  - Educate patient/family on patient safety including physical limitations  - Instruct patient to call for assistance with activity   - Consult OT/PT to assist with strengthening/mobility   - Keep Call bell within reach  - Keep bed low and locked with side rails adjusted as appropriate  - Keep care items and personal belongings within reach  - Initiate and maintain comfort rounds  - Make Fall Risk Sign visible to staff  - Offer Toileting every 2 Hours, in advance of need  - Initiate/Maintain bed and chair alarm  - Obtain necessary fall risk management equipment:   - Apply yellow socks and bracelet for high fall risk patients  - Consider moving patient to room near nurses station  Outcome: Progressing  Goal: Maintain or return to baseline ADL function  Description: INTERVENTIONS:  -  Assess patient's ability to carry out ADLs; assess patient's baseline  for ADL function and identify physical deficits which impact ability to perform ADLs (bathing, care of mouth/teeth, toileting, grooming, dressing, etc.)  - Assess/evaluate cause of self-care deficits   - Assess range of motion  - Assess patient's mobility; develop plan if impaired  - Assess patient's need for assistive devices and provide as appropriate  - Encourage maximum independence but intervene and supervise when necessary  - Involve family in performance of ADLs  - Assess for home care needs following discharge   - Consider OT consult to assist with ADL evaluation and planning for discharge  - Provide patient education as appropriate  Outcome: Progressing  Goal: Maintains/Returns to pre admission functional level  Description: INTERVENTIONS:  - Perform AM-PAC 6 Click Basic Mobility/ Daily Activity assessment daily.  - Set and communicate daily mobility goal to care team and patient/family/caregiver.   - Collaborate with rehabilitation services on mobility goals if consulted  - Perform Range of Motion 3 times a day.  - Reposition patient every 2 hours.  - Dangle patient 3 times a day  - Stand patient 3 times a day  - Ambulate patient 3 times a day  - Out of bed to chair 3 times a day   - Out of bed for meals 3 times a day  - Out of bed for toileting  - Record patient progress and toleration of activity level   Outcome: Progressing     Problem: DISCHARGE PLANNING  Goal: Discharge to home or other facility with appropriate resources  Description: INTERVENTIONS:  - Identify barriers to discharge w/patient and caregiver  - Arrange for needed discharge resources and transportation as appropriate  - Identify discharge learning needs (meds, wound care, etc.)  - Arrange for interpretive services to assist at discharge as needed  - Refer to Case Management Department for coordinating discharge planning if the patient needs post-hospital services based on physician/advanced practitioner order or complex needs related to  functional status, cognitive ability, or social support system  Outcome: Progressing     Problem: Knowledge Deficit  Goal: Patient/family/caregiver demonstrates understanding of disease process, treatment plan, medications, and discharge instructions  Description: Complete learning assessment and assess knowledge base.  Interventions:  - Provide teaching at level of understanding  - Provide teaching via preferred learning methods  Outcome: Progressing

## 2024-07-17 NOTE — PROGRESS NOTES
NEPHROLOGY HOSPITAL PROGRESS NOTE   Joaquin Frankel 44 y.o. male MRN: [id number]  Unit/Bed#: S -01 Encounter: [id number]  Reason for Consult: Acute kidney injury requiring dialysis    ASSESSMENT and PLAN:    44-year-old male with a past medical history of CKD stage III with acute kidney injury on prior admission in June started on hemodialysis June 7, 2024, hypertension, monoclonal gammopathy, who is now presenting to the hospital early July with chest pain and concern for PE on admission.  Nephrology is on board for CLOVIS requiring dialysis     1-acute kidney injury currently on dialysis-present on admission.  Underlying CKD stage III.  Baseline creatinine 1.2 to 1/dL     - Had CLOVIS started on dialysis June 7, 2024  - Outpatient unit [facility]  - Access-right IJ PermCath  - Etiology-unclear.  MGR S versus other  - Does not have signs of renal recovery as of yet  - Noted attempted biopsy last week but unsuccessful due to respiratory status.  Therefore patient is being planned for renal biopsy July 15 with anesthesia present  Which was not able to be completed due to her respiratory status with hypoxia  - Prior bone marrow biopsy June 12 did not reveal a plasma cell neoplasm  - July 15 15th-dialysis completed with 4 L ultrafiltration.  Biopsy cannot be completed for renal biopsy due to hypoxia  - July 16-no urgent indication for dialysis we will plan for dialysis on July 17.  Started on hydralazine for hypertension.  - July 17-dialysis planned for today.     Plan  - Noted hypoxia may be related to ANGY/hypoventilation syndrome.  - Heparin drip management per primary team  - Coordination of heparin drip with IR team will be by primary team once biopsy is scheduled  - Next dialysis today from renal standpoint.  This afternoon  - Ideally we are planning for 4-hour treatment with 4 L ultrafiltration but due to staffing issues we may need to shorten to 3 hours with 3 L ultrafiltration  - Lab work again in  "a.m.  - Continue current antihypertensive regimen as the patient is responding appropriately  - Post renal biopsy will need CBC  - I have reached out to IR team to discuss case and timing of biopsy  - I reached out to primary team resident we are in agreement renal plan for dialysis today    - Reviewed case with IR attendings.  Reviewed case with IR attending who is on for Friday this week.  Potential plan for biopsy on Friday and they will help coordinate with anesthesia as backup.      Portions of the record may have been created with voice recognition software. Occasional wrong word or \"sound a like\" substitutions may have occurred due to the inherent limitations of voice recognition software. Read the chart carefully and recognize, using context, where substitutions have occurred.If you have any questions, please contact the dictating provider.        2-tunneled dialysis catheter site-very minimal erythema noted at insertion site.  Will plan to monitor for now.  This site appears improved from overall     3-electrolytes-monitor with dialysis-appropriate for now with the exception of borderline hyponatremia in the setting of volume overload which will monitor for now     4-acid/base-monitor with dialysis.  Appropriate for now     5-chest pain-resolved.  With concern for underlying coronary artery disease being medically managed for now with cardiology team     6-volume overload-ultrafiltrate with dialysis as tolerates.     - On torsemide 100 mg daily     7-anemia-no JOSSELYN due to acute PE.  Hemoglobin at goal for biopsy above 8.     On IV iron     8-nephrotic range proteinuria-plan for biopsy      - Serological workup negative except for IgG kappa on SPEP and UPEP  - Renal biopsy planning in progress     9-hypertension-on amlodipine, carvedilol, torsemide.  Not able to increase carvedilol due to bradycardia     - Add hydralazine to her regimen July 16  - Blood pressure is appropriately improving    SUBJECTIVE / 24H " INTERVAL HISTORY:    Patient denies complaints.  Oxygenation appropriate on nasal cannula.  Blood pressures improved with addition of hydralazine yesterday.  Dialysis later today.    OBJECTIVE:  Current Weight: Weight - Scale: (!) 173 kg (381 lb 9.9 oz)  Vitals:    07/16/24 2114 07/17/24 0534 07/17/24 0600 07/17/24 0800   BP:  156/73  134/65   BP Location:  Right arm  Right arm   Pulse: 59 (!) 53  (!) 52   Resp:  18     Temp:    98.1 °F (36.7 °C)   TempSrc:    Oral   SpO2:    98%   Weight:   (!) 173 kg (381 lb 9.9 oz)    Height:           Intake/Output Summary (Last 24 hours) at 7/17/2024 1013  Last data filed at 7/17/2024 0900  Gross per 24 hour   Intake 720 ml   Output 500 ml   Net 220 ml     General: NAD  Skin: no rash  Eyes: anicteric sclera  ENT: moist mucous membrane  Neck: supple  Chest: CTA b/l, no ronchii, no wheeze, no rubs, no rales  CVS: s1s2, no murmur, no gallop, no rub  Abdomen: soft, nontender, nl sounds  Extremities: 3-4 +  edema LE b/l, tunneled catheter site covered.  No drainage.  Minimal erythema unchanged  : no lewis  Neuro: AAOX3  Psych: normal affect    Medications:    Current Facility-Administered Medications:     acetaminophen (TYLENOL) tablet 650 mg, 650 mg, Oral, Q6H PRN, Mily Martin MD, 650 mg at 07/11/24 1950    amLODIPine (NORVASC) tablet 10 mg, 10 mg, Oral, Daily, Andrea Longoria MD, 10 mg at 07/17/24 0838    atorvastatin (LIPITOR) tablet 80 mg, 80 mg, Oral, Daily, Mily Martin MD, 80 mg at 07/17/24 0838    carvedilol (COREG) tablet 6.25 mg, 6.25 mg, Oral, BID With Meals, Andrea Longorai MD, 6.25 mg at 07/17/24 0838    heparin (porcine) 25,000 units in 0.45% NaCl 250 mL infusion (premix), 3-30 Units/kg/hr (Order-Specific), Intravenous, Titrated, Lorri Crow, MD, Last Rate: 36.3 mL/hr at 07/17/24 0907, 29 Units/kg/hr at 07/17/24 0907    heparin (porcine) injection 10,000 Units, 10,000 Units, Intravenous, Q6H PRN, Lorri Maria MD    heparin (porcine) injection 5,000 Units, 5,000 Units,  Intravenous, Q6H PRN, Lorri Maria MD, 5,000 Units at 07/17/24 0907    hydrALAZINE (APRESOLINE) tablet 10 mg, 10 mg, Oral, Q8H SHAZIA, Kriss Velazco MD, 10 mg at 07/17/24 0535    iron sucrose (VENOFER) 100 mg in sodium chloride 0.9 % 100 mL IVPB, 100 mg, Intravenous, After Dialysis, Johnny Viramontes MD, Stopped at 07/15/24 1311    isosorbide mononitrate (IMDUR) 24 hr tablet 60 mg, 60 mg, Oral, Daily, Mily Martin MD, 60 mg at 07/17/24 0838    labetalol (NORMODYNE) injection 10 mg, 10 mg, Intravenous, Q6H PRN, Liana Riley MD    lidocaine (LIDODERM) 5 % patch 1 patch, 1 patch, Topical, Daily, Mily Martin MD    nystatin (MYCOSTATIN) powder, , Topical, BID, Lorri Maria MD, Given at 07/17/24 0839    ondansetron (ZOFRAN) injection 4 mg, 4 mg, Intravenous, Once, Cindy Balderas MD    oxyCODONE (ROXICODONE) immediate release tablet 10 mg, 10 mg, Oral, Q4H PRN, Lorri Maria MD, 10 mg at 07/17/24 0759    oxyCODONE (ROXICODONE) IR tablet 5 mg, 5 mg, Oral, Q4H PRN, Lorri Maria MD, 5 mg at 07/17/24 0534    polyethylene glycol (MIRALAX) packet 17 g, 17 g, Oral, Daily PRN, Diane Oliveira MD    sevelamer (RENAGEL) tablet 800 mg, 800 mg, Oral, TID With Meals, Mily Martin MD, 800 mg at 07/17/24 0838    torsemide (DEMADEX) tablet 100 mg, 100 mg, Oral, Daily, Charles Spear MD, 100 mg at 07/17/24 0838    Laboratory Results:  Results from last 7 days   Lab Units 07/17/24  0735 07/16/24  0549 07/15/24  0459 07/13/24  0745 07/12/24  0549 07/11/24  0925   WBC Thousand/uL 5.69 5.84 6.60 5.99 6.08 6.48   HEMOGLOBIN g/dL 9.1* 8.7* 8.3* 9.4* 8.1* 9.1*   HEMATOCRIT % 29.0* 27.3* 26.6* 30.0* 25.9* 28.9*   PLATELETS Thousands/uL 201 175 236 261 239 241   POTASSIUM mmol/L 4.4 4.3 4.6 4.4 4.3 4.0   CHLORIDE mmol/L 93* 95* 96 95* 95* 94*   CO2 mmol/L 30 30 27 27 27 28   BUN mg/dL 40* 32* 46* 27* 39* 32*   CREATININE mg/dL 9.55* 8.00* 10.06* 7.33* 8.84* 8.13*   CALCIUM mg/dL 9.1 8.8 9.1 9.3 9.0 9.2   MAGNESIUM mg/dL 2.1  1.8*  --   --   --   --    PHOSPHORUS mg/dL 5.4* 4.3  --   --   --   --

## 2024-07-17 NOTE — HOSPITAL COURSE
Is a 44 y.o. male with a PMHx of ESRD on HD, CAD s/p stenting/PCI, HTN, TIIDM, chronic LLE lymphedema who presents with c/o chest pain and shortness of breath with dyspnea on exertion.  He states chest pain and shortness of breath have been worsening to the point where he is unable to walk 10 feet or shower without needing to take a break. He was hospitalized from [facility] 3-26th and underwent testing including echocardiogram and stress test. He notes that his enzymes were not elevated. He was identified to have renal failure and initiated on dialysis during that admission.    In the emergency department patient was found to be in hypertensive urgency up to 206/95 and hypoxic 83 on RA so he was placed on 3L NC.  Workup in the ED notable for H&H 6.6/21.2, negative troponins (31,35,36), , D-dimer 4.21, CTA PE revealing subsegmental right lower lobe pulmonary embolism, and CXR w/ mild pulmonary venous congestion. He received 2 units of packed red blood cells in the ED and was placed on heparin drip.  Patient was admitted and cardiology as well as nephrology were consulted.     7/5 Amlodipine was added for blood pressure control and metoprolol was switched for Coreg 6.25 mg BID.  Oral anticoagulation was held due to consideration for IR guided renal biopsy.  Obtained repeat echo, with no significant change from 6/2024. Dialysis was continued on a MWF schedule.     7/6 Per cardiology, chest pain seems multifactorial (severe uncontrolled hypertension, volume overload in context of end-stage renal disease, symptomatic anemia, subsegmental pulmonary embolism) with no clear objective suggestion of angina/ACS.  Patient had taken ASA the day prior, so the earliest the renal biopsy could be performed would be 7/11.    7/7 Overnight pulse oximetry was obtained due to suspicion for ANGY.  Result: lowest O2 sat 73%. Patient had 120 desaturation events <89%.  He spent an 1 hour and 15 minutes in desaturation (~15.82%) and and 1  hour and 34 minutes with desaturation <89% (~19.77% ).  Patient declined use of oxygen at bedtime preferring to wait for a formal sleep study as an outpatient.  Chest pain and shortness of breath markedly improved.    7/8 wound care consulted for right buttock and left thigh wounds.  Patient declined wound care preferring to care for the wounds himself.  Transfused 1 unit of blood during hemodialysis due to hemoglobin being below goal of 8.    7/9 Telemetry discontinued due to increased exercise tolerance and resolution of chest pain or shortness of breath.    7/10 Renal biopsy was scheduled for 7/12. Started on IV venofer for 10 doses with dialysis.     7/12 renal biopsy was not performed due to hypertension despite sedation and antihypertensive medications.     7/15 Renal biopsy was not completed due to oxygen saturation of 76-89% during dialysis. Repeat CXR is unchanged from previous image. Heparin has remained therapeutic. Patient denied CP, SOB, new lower extremity swelling, cough, calf tenderness, fever, chills. Oxygen desaturation likely due to ANGY/OHS as patient has previous overnight pulse oximetry documenting multiple desaturations throughout the night and pt states he was sleeping during the timeframe of documented vitals.  Patient agreed to wear oxygen.    7/16 In addition to current antihypertensives, pt was started on hydralazine 10mg TID for better blood pressure control.    7/17 The renal biopsy will now occur on Friday 7/18 with anesthesia as backup. The current plan is to do to the procedure under local anesthesia with minimal sedation. Patient received hemodialysis which he tolerated well. 3.5L removed    7/18 hydralazine was increased to 25 mg every 8 hours per nephrology    7/19 renal bx     7/20 bridging to warfarin 5mg to 7.5mg     7/22 warfarin 10 mg    7/23 Warfarin 12.5 mg. 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 checking HIV parvovirus but overall severe  fibrosis.

## 2024-07-17 NOTE — UTILIZATION REVIEW
Continued Stay Review    Date: 7/17/2024                          Current Patient Class:  Inpatient  Current Level of Care:  Med Surg    HPI:44 y.o. male initially admitted on 7/4/2024 due to chest pain.      Assessment/Plan: 7/15 renal biopsy not done due to O2 sat of 76-89% during dialysis. CXR unchanged. Heparin remains therapeutic. O2 desat likely due to ANGY, hx of prior pulse ox documenting multiple desaturation throughout the night and he reports he was sleeping when the desats were recorded. Renal biopsy rescheduled for 7/18. He remains on Supplemental O2 HS.     Vital Signs (last 3 days)       Date/Time Temp Pulse Resp BP MAP (mmHg) SpO2 O2 Device Patient Position - Orthostatic VS [place] Coma Scale Score Pain    07/17/24 1200 -- 55 18 136/84 100 91 % -- -- -- --    07/17/24 1157 -- 56 18 131/74 92 -- -- -- -- --    07/17/24 1154 97.9 °F (36.6 °C) 55 18 137/80 99 -- -- -- -- --    07/17/24 1121 -- -- -- -- -- -- -- -- -- 8 07/17/24 0800 98.1 °F (36.7 °C) 52 -- 134/65 -- 98 % None (Room air) Sitting -- --    07/17/24 0759 -- -- -- -- -- -- -- -- -- 7 07/17/24 0534 -- 53 18 156/73 101 -- -- -- -- 7 07/17/24 0217 -- -- -- -- -- -- -- -- -- 8 07/17/24 0037 -- -- -- -- -- -- -- -- -- 8 07/16/24 2118 -- -- -- -- -- -- -- -- -- 8 07/16/24 2114 -- 59 -- -- -- -- -- -- -- --    07/16/24 20:43:03 97.5 °F (36.4 °C) 53 20 143/73 96 96 % -- -- -- --    07/16/24 1940 -- -- -- -- -- -- -- -- 15 --    07/16/24 1937 -- -- -- -- -- -- -- -- -- 8 07/16/24 1631 -- -- -- -- -- -- -- -- -- 7 07/16/24 15:20:11 -- -- -- 139/69 92 -- -- -- -- --    07/16/24 1418 -- -- -- -- -- -- -- -- -- 7 07/16/24 14:16:42 -- -- -- 120/62 81 -- -- -- -- --    07/16/24 1216 98.5 °F (36.9 °C) -- -- -- -- -- -- -- -- 7 07/16/24 1037 -- -- -- -- -- -- -- -- -- 7 07/16/24 0909 -- 59 -- 163/79 -- -- -- -- -- --    07/16/24 0905 -- -- -- -- -- -- -- -- -- 8 07/16/24 0900 -- -- -- -- -- -- -- -- 15 --    07/16/24  08:05:25 -- 56 16 159/94 116 92 % None (Room air) Lying -- --    07/15/24 2146 -- -- -- -- -- -- None (Room air) -- 15 --    07/15/24 21:21:11 98 °F (36.7 °C) 57 -- 159/72 101 94 % -- -- -- --    07/15/24 1722 -- -- -- -- -- -- -- -- -- 7    07/15/24 1539 -- -- -- -- -- -- -- -- -- 8    07/15/24 13:26:06 -- 59 -- 168/81 110 84 % -- -- -- --    07/15/24 1323 98 °F (36.7 °C) 66 18 171/75 107 86 % -- -- -- --    07/15/24 1300 -- 59 18 162/78 106 88 % -- -- -- --    07/15/24 1230 -- 59 18 161/67 98 84 % -- -- -- --    07/15/24 1200 -- 58 18 159/71 100 89 % -- -- -- --    07/15/24 1130 -- 59 18 151/70 97 87 % -- -- -- --    07/15/24 1100 -- 57 18 155/76 102 76 % -- -- -- --    07/15/24 1030 -- 55 18 157/74 102 80 % -- -- -- --    07/15/24 1000 -- 61 18 157/78 104 88 % -- -- -- --    07/15/24 0930 -- 53 18 165/89 114 91 % -- -- -- --    07/15/24 0923 98 °F (36.7 °C) 54 18 155/74 101 96 % -- Lying -- 8    07/15/24 0900 -- -- -- -- -- -- -- -- 15 8    07/15/24 0826 98 °F (36.7 °C) 62 17 141/68 92 88 % None (Room air) Lying -- --    07/15/24 0637 -- -- -- -- -- -- -- -- -- 8    07/15/24 0432 -- -- -- -- -- -- -- -- -- 8    07/15/24 0206 -- -- -- -- -- -- -- -- -- 8 07/14/24 2356 -- -- -- -- -- -- -- -- -- 8 07/14/24 21:19:03 -- 57 -- 146/68 94 91 % -- -- -- --    07/14/24 2118 -- -- -- -- -- -- -- -- -- 7 07/14/24 2100 -- -- -- -- -- -- None (Room air) -- 15 --    07/14/24 1921 -- -- -- -- -- -- -- -- -- 7 07/14/24 17:13:50 -- -- 18 126/63 84 -- -- -- -- --    07/14/24 1610 -- -- -- -- -- -- -- -- -- 7 07/14/24 1230 -- -- -- -- -- -- -- -- -- 7 07/14/24 1039 -- -- -- -- -- -- -- -- -- 7 07/14/24 0830 -- -- -- -- -- -- -- -- 15 --    07/14/24 0817 -- -- -- -- -- -- -- -- -- 7 07/14/24 07:47:22 98.9 °F (37.2 °C) 61 -- 137/59 85 91 % -- -- -- --    07/14/24 0321 -- -- -- -- -- -- -- -- -- 7 07/14/24 0138 -- -- -- -- -- -- -- -- -- 8          Weight (last 2 days)       Date/Time Weight    07/17/24 0600  173 (381.62)    07/16/24 0531 171 (377.43)    07/15/24 0458 175 (386.2)              Pertinent Labs/Diagnostic Results:   Radiology:  XR chest portable   Final Interpretation by Danyell Ford MD (07/15 1655)      No acute cardiopulmonary disease.            Workstation performed: [id number]         IR biopsy kidney random   Final Interpretation by Radames Liu MD (07/12 1733)   CT-guided random kidney biopsy canceled due to inability to optimize blood pressure versus respiratory status.      Plan:   Will need repeat attempt at CT-guided biopsy with anesthesiology assistance, likely general anesthesia.      Workstation performed: [id number]         CTA ED chest PE Study   Final Interpretation by Gurjit Gibbons MD (07/04 2003)      Subsegmental right lower lobe pulmonary embolism.   No CT evidence of right heart strain.      Diffuse patchy groundglass opacities consistent with infectious/inflammatory infiltrate.      Findings were discussed with Dr. Monique Rothman MD 8:00 p.m. on 7/4/2024            Workstation performed: [id number]         XR chest 1 view portable   Final Interpretation by Danyell Ford MD (07/04 1707)      Mild pulmonary venous congestion.            Workstation performed: [id number]         IR biopsy kidney random    (Results Pending)     Cardiology:  ECG 12 lead   Final Result by Jhon Pedraza DO (07/08 0741)   Normal sinus rhythm   Normal ECG   When compared with ECG of 05-JUL-2024 00:44, (unconfirmed)   No significant change was found   Confirmed by Jhon Pedraza ([id number]) on 7/8/2024 7:41:44 AM      ECG 12 lead   Final Result by Andrea Longoria MD (07/06 1125)   Sinus bradycardia with sinus arrhythmia   Cannot rule out Anterior infarct , age undetermined   Abnormal ECG   When compared with ECG of 04-JUL-2024 14:52, (unconfirmed)   Premature atrial complexes are no longer Present   Confirmed by Andrea Longoria ([id number]) on 7/6/2024 11:25:40 AM      ECG 12  lead   Final Result by Andrea Longoria MD (07/06 0067)   Sinus rhythm with Premature atrial complexes   Otherwise normal ECG   When compared with ECG of 21-JUN-2024 15:16,   Premature atrial complexes are now Present   Confirmed by Andrea Longoria ([id number]) on 7/6/2024 11:07:32 AM        GI:  No orders to display           Results from last 7 days   Lab Units 07/17/24  0735 07/16/24  0549 07/15/24  0459 07/13/24  0745 07/12/24  0549 07/11/24  0925   WBC Thousand/uL 5.69 5.84 6.60 5.99 6.08 6.48   HEMOGLOBIN g/dL 9.1* 8.7* 8.3* 9.4* 8.1* 9.1*   HEMATOCRIT % 29.0* 27.3* 26.6* 30.0* 25.9* 28.9*   PLATELETS Thousands/uL 201 175 236 261 239 241   TOTAL NEUT ABS Thousands/µL  --   --   --   --  4.05 4.08         Results from last 7 days   Lab Units 07/17/24  0735 07/16/24  0549 07/15/24  0459 07/13/24  0745 07/12/24  0549   SODIUM mmol/L 131* 131* 132* 132* 131*   POTASSIUM mmol/L 4.4 4.3 4.6 4.4 4.3   CHLORIDE mmol/L 93* 95* 96 95* 95*   CO2 mmol/L 30 30 27 27 27   ANION GAP mmol/L 8 6 9 10 9   BUN mg/dL 40* 32* 46* 27* 39*   CREATININE mg/dL 9.55* 8.00* 10.06* 7.33* 8.84*   EGFR ml/min/1.73sq m 5 7 5 8 6   CALCIUM mg/dL 9.1 8.8 9.1 9.3 9.0   MAGNESIUM mg/dL 2.1 1.8*  --   --   --    PHOSPHORUS mg/dL 5.4* 4.3  --   --   --          Results from last 7 days   Lab Units 07/17/24  1154 07/17/24  0817 07/16/24  2046 07/16/24  1521 07/16/24  1119 07/16/24  0804 07/15/24  2119 07/15/24  1146 07/15/24  0819 07/14/24  2125 07/14/24  1712 07/14/24  1116   POC GLUCOSE mg/dl 99 89 105 117 107 87 125 80 83 129 112 104     Results from last 7 days   Lab Units 07/17/24  0735 07/16/24  0549 07/15/24  0459 07/13/24  0745 07/12/24  0549 07/11/24  0925   GLUCOSE RANDOM mg/dL 84 89 85 82 91 112       Results from last 7 days   Lab Units 07/15/24  1545   PH MAGUE  7.353   PCO2 MAGUE mm Hg 52.5*   PO2 MAGUE mm Hg 54.8*   HCO3 MAGUE mmol/L 28.5   BASE EXC MAGUE mmol/L 2.3   O2 CONTENT MAGUE ml/dL 12.3   O2 HGB, VENOUS % 84.8*       Results from last 7 days   Lab  Units 07/17/24  0735 07/16/24  0549 07/15/24  2238 07/13/24  0038 07/12/24  1623   PROTIME seconds  --   --   --   --  14.4   INR   --   --   --   --  1.06   PTT seconds 59* 75* 69*   < >  --     < > = values in this interval not displayed.         Medications:   Scheduled Medications:  amLODIPine, 10 mg, Oral, Daily  atorvastatin, 80 mg, Oral, Daily  carvedilol, 6.25 mg, Oral, BID With Meals  hydrALAZINE, 10 mg, Oral, Q8H SHAZIA  isosorbide mononitrate, 60 mg, Oral, Daily  lidocaine, 1 patch, Topical, Daily  nystatin, , Topical, BID  sevelamer, 800 mg, Oral, TID With Meals  torsemide, 100 mg, Oral, Daily  Mag Sulfate 2G IVPB 7/16 x1       Continuous IV Infusions:  heparin (porcine), 3-30 Units/kg/hr (Order-Specific), Intravenous, Titrated      PRN Meds:  acetaminophen, 650 mg, Oral, Q6H PRN  heparin (porcine), 10,000 Units, Intravenous, Q6H PRN  heparin (porcine), 5,000 Units, Intravenous, Q6H PRN - 7/17 x 1   iron sucrose, 100 mg, Intravenous, After Dialysis  labetalol, 10 mg, Intravenous, Q6H PRN  oxyCODONE, 10 mg, Oral, Q4H PRN - 7/16 x 5- 7/17 x 2   oxyCODONE, 5 mg, Oral, Q4H PRN-7/16 x 4- 7/17 x 3   polyethylene glycol, 17 g, Oral, Daily PRN        Discharge Plan: D    Network Utilization Review Department  ATTENTION: Please call with any questions or concerns to [phone] and carefully listen to the prompts so that you are directed to the right person. All voicemails are confidential.   For Discharge needs, contact Care Management DC Support Team at [phone] opt. [phone]  Send all requests for admission clinical reviews, approved or denied determinations and any other requests to dedicated fax number below belonging to the campus where the patient is receiving treatment. List of dedicated fax numbers for the Facilities:  FACILITY NAME UR FAX NUMBER   ADMISSION DENIALS (Administrative/Medical Necessity) [phone]   DISCHARGE SUPPORT TEAM (NETWORK) [phone]   PARENT CHILD HEALTH  (Maternity/NICU/Pediatrics) [phone]   [facility] [phone]   [facility] [phone]   [facility] [phone]   [facility] [phone]   [facility] [phone]   [facility] [phone]   [facility] [phone]   [facility] [phone]   [facility] [phone]   [facility] [phone]   [facility] [phone]   [facility] [phone]

## 2024-07-17 NOTE — PLAN OF CARE
Problem: Potential for Falls  Goal: Patient will remain free of falls  Description: INTERVENTIONS:  - Educate patient/family on patient safety including physical limitations  - Instruct patient to call for assistance with activity   - Consult OT/PT to assist with strengthening/mobility   - Keep Call bell within reach  - Keep bed low and locked with side rails adjusted as appropriate  - Keep care items and personal belongings within reach  - Initiate and maintain comfort rounds  - Apply yellow socks and bracelet for high fall risk patients  - Consider moving patient to room near nurses station  Outcome: Progressing     Problem: METABOLIC, FLUID AND ELECTROLYTES - ADULT  Goal: Electrolytes maintained within normal limits  Description: INTERVENTIONS:  - Monitor labs and assess patient for signs and symptoms of electrolyte imbalances  - Administer electrolyte replacement as ordered  - Monitor response to electrolyte replacements, including repeat lab results as appropriate  - Instruct patient on fluid and nutrition as appropriate  Outcome: Progressing  Goal: Fluid balance maintained  Description: INTERVENTIONS:  - Monitor labs   - Monitor I/O and WT  - Instruct patient on fluid and nutrition as appropriate  - Assess for signs & symptoms of volume excess or deficit  Outcome: Progressing     Problem: Prexisting or High Potential for Compromised Skin Integrity  Goal: Skin integrity is maintained or improved  Description: INTERVENTIONS:  - Identify patients at risk for skin breakdown  - Assess and monitor skin integrity  - Assess and monitor nutrition and hydration status  - Monitor labs   - Assess for incontinence   - Turn and reposition patient  - Assist with mobility/ambulation  - Relieve pressure over bony prominences  - Avoid friction and shearing  - Provide appropriate hygiene as needed including keeping skin clean and dry  - Evaluate need for skin moisturizer/barrier cream  - Collaborate with interdisciplinary team    - Patient/family teaching  - Consider wound care consult   Outcome: Progressing     Problem: Nutrition/Hydration-ADULT  Goal: Nutrient/Hydration intake appropriate for improving, restoring or maintaining nutritional needs  Description: Monitor and assess patient's nutrition/hydration status for malnutrition. Collaborate with interdisciplinary team and initiate plan and interventions as ordered.  Monitor patient's weight and dietary intake as ordered or per policy. Utilize nutrition screening tool and intervene as necessary. Determine patient's food preferences and provide high-protein, high-caloric foods as appropriate.     INTERVENTIONS:  - Monitor oral intake, urinary output, labs, and treatment plans  - Assess nutrition and hydration status and recommend course of action  - Evaluate amount of meals eaten  - Assist patient with eating if necessary   - Allow adequate time for meals  - Recommend/ encourage appropriate diets, oral nutritional supplements, and vitamin/mineral supplements  - Order, calculate, and assess calorie counts as needed  - Recommend, monitor, and adjust tube feedings and TPN/PPN based on assessed needs  - Assess need for intravenous fluids  - Provide specific nutrition/hydration education as appropriate  - Include patient/family/caregiver in decisions related to nutrition  Outcome: Progressing     Problem: PAIN - ADULT  Goal: Verbalizes/displays adequate comfort level or baseline comfort level  Description: Interventions:  - Encourage patient to monitor pain and request assistance  - Assess pain using appropriate pain scale  - Administer analgesics based on type and severity of pain and evaluate response  - Implement non-pharmacological measures as appropriate and evaluate response  - Consider cultural and social influences on pain and pain management  - Notify physician/advanced practitioner if interventions unsuccessful or patient reports new pain  Outcome: Progressing     Problem: SAFETY  ADULT  Goal: Patient will remain free of falls  Description: INTERVENTIONS:  - Educate patient/family on patient safety including physical limitations  - Instruct patient to call for assistance with activity   - Consult OT/PT to assist with strengthening/mobility   - Keep Call bell within reach  - Keep bed low and locked with side rails adjusted as appropriate  - Keep care items and personal belongings within reach  - Apply yellow socks and bracelet for high fall risk patients  - Consider moving patient to room near nurses station  Outcome: Progressing  Goal: Maintain or return to baseline ADL function  Description: INTERVENTIONS:  -  Assess patient's ability to carry out ADLs; assess patient's baseline for ADL function and identify physical deficits which impact ability to perform ADLs (bathing, care of mouth/teeth, toileting, grooming, dressing, etc.)  - Assess/evaluate cause of self-care deficits   - Assess range of motion  - Assess patient's mobility; develop plan if impaired  - Assess patient's need for assistive devices and provide as appropriate  - Encourage maximum independence but intervene and supervise when necessary  - Involve family in performance of ADLs  - Assess for home care needs following discharge   - Consider OT consult to assist with ADL evaluation and planning for discharge  - Provide patient education as appropriate  Outcome: Progressing  Goal: Maintains/Returns to pre admission functional level  Description: INTERVENTIONS:  - Perform AM-PAC 6 Click Basic Mobility/ Daily Activity assessment daily.  - Set and communicate daily mobility goal to care team and patient/family/caregiver.   - Collaborate with rehabilitation services on mobility goals if consulted  - Perform Range of Motion   - Out of bed for toileting  - Record patient progress and toleration of activity level   Outcome: Progressing     Problem: DISCHARGE PLANNING  Goal: Discharge to home or other facility with appropriate  resources  Description: INTERVENTIONS:  - Identify barriers to discharge w/patient and caregiver  - Arrange for needed discharge resources and transportation as appropriate  - Identify discharge learning needs (meds, wound care, etc.)  - Arrange for interpretive services to assist at discharge as needed  - Refer to Case Management Department for coordinating discharge planning if the patient needs post-hospital services based on physician/advanced practitioner order or complex needs related to functional status, cognitive ability, or social support system  Outcome: Progressing     Problem: Knowledge Deficit  Goal: Patient/family/caregiver demonstrates understanding of disease process, treatment plan, medications, and discharge instructions  Description: Complete learning assessment and assess knowledge base.  Interventions:  - Provide teaching at level of understanding  - Provide teaching via preferred learning methods  Outcome: Progressing

## 2024-07-17 NOTE — PROGRESS NOTES
[facility]  Progress Note  Name: Joaquin Frankel I  MRN: [id number]  Unit/Bed#: S -01 I Date of Admission: 7/4/2024   Date of Service: 7/17/2024 I Hospital Day: 13    Assessment & Plan   * Chest pain  Assessment & Plan  Patient presented with worsening substernal chest pain over several weeks.  H/o chronic angina, CAD, family history of hereditary heart disease on fathers side.   Patient was admitted for similar chest pain in early June and was found to have ESRD, started on HD MWF.  Recent nuclear stress test without significant reversible ischemia  EKG on admission: Sinus Rhythm with PAC's. Repeat EKG showed sinus bradycardia  High-sensitivity troponin negative x 5   . D-Dimer 4.21  CTA: subsegmental right lower lobe pulmonary embolism  07/05/24: Updated echocardiogram on unremarkable  Patient blood pressure elevated on admission to 194/80, currently stable.  Patient received 2 doses of nitroglycerin in ED and reports it did not improve his pain, but it improved his elevated blood pressure.  Patient was saturating low 80s on room air on admission and required 3L supplemental O2, which improved saturation to 90s. O2 Sat currently 94% to 95% on room air.  MRSA negative. Viral panel negative.  Multifactorial from acute on chronic anemia, hypertensive urgency, and acute PE   Patient's chest pain has resolved following hemodialysis and the improvement of high blood pressure.   7/9: Telemetry discontinued    Plan-  Per cardiology recs: Continue amlodipine 5 mg daily,Coreg 6.25 mg BID, Imdur 60 mg daily, atorvastatin 80 mg daily. Holding aspirin 81 mg daily    Pulmonary embolism (HCC)  Assessment & Plan  Patient presented for worsening chest pain, shortness of breath, and dyspnea on exertion.  Patient was admitted for similar chest pain in early June and was found to have ESRD, started on HD MWF   D-Dimer 4.21  CTA showed subsegmental right lower lobe pulmonary  embolism  Placed on heparin drip  7/12/24 Heparin was subtherapeutic this morning, PTT 55. No bolus was given or increase in gtt as patient will have the gtt stopped at 10 am pending IR procedure.  Saturating well on 2 L via nasal cannula    Plan  Hold heparin tomorrow morning, 7/18, for upcoming renal biopsy   Transition to warfarin 5 mg daily after renal biopsy     ESRD (end stage renal disease) on dialysis ([facility])  Assessment & Plan  Lab Results   Component Value Date    EGFR 5 07/17/2024    EGFR 7 07/16/2024    EGFR 5 07/15/2024    CREATININE 9.55 (H) 07/17/2024    CREATININE 8.00 (H) 07/16/2024    CREATININE 10.06 (H) 07/15/2024     Patient ESRD on Dialysis 3 times a week, MWF  Elevated , c/o of orthopnea   Etiology of CLOVIS: Etiology not entirely clear - Possible MGRS? Pattern of disease progression is not typical for diabetic nephropathy.   Etiology of CKD: Suspected due to diabetic nephropathy, hypertensive nephrosclerosis, obesity related renal dysfunction  Previous baseline creatinine 1.3-1.6 w/ prior episodes of CLOVIS  Serologic workup negative except for IgG kappa on SPEP and UPEP.  Cryoglobulin negative, ANCA negative, anti-dsDNA negative, anti-GBM negative, JOSE ROBERTO negative, C4 34, C3 118  Bone marrow biopsy 6/12/2024: negative for plasma cell neoplasm.   CRRT initiated 6/7/2024. Peak creatinine 10.4  Last dialyzed 7/17/2024   Dry weight decreased to 172.5 kg   electrolytes and acid/base stable  7/12 renal biopsy was not performed due to hypertension despite sedation and antihypertensive medications. Rescheduled for Monday 7/15/24  7/15 renal biopsy was not completed due to oxygen saturation of 76-89% during dialysis.  Likely due to ANGY/OHS.  Agreed to wear oxygen via nasal cannula.    Plan:  Nephrology onboard  Hold heparin tomorrow morning, 7/18, for upcoming renal biopsy   Obtain CBC post biopsy  Hemodialysis today  Hold oral AC. Resume ASA 81 mg daily after renal biopsy   HD per nephro.  Continue  torsemide 100 mg daily    Hypertension  Assessment & Plan  History of HTN, recent medication change after dx of ESRD  Workup at that time notable for IgG kappa monoclonal gammopathy and s/p bone marrow biopsy on 6/12/2023 to assess for multiple myeloma. Bone marrow biopsy with no evidence of plasma cell neoplasm.   HD on MWF schedule.   Presented to ED with uncontrolled /80. Endorsed headaches and blurry vision in his left eye in the ED that has since resolved.      Plan:  Hydralazine 10 mg Q8 started yesterday   Continue amlodipine 10 mg daily, carvedilol 6.25 mg twice daily, Imdur 60 mg once daily, torsemide 100 mg daily  Hold carvedilol if HR <50  Nephrology and cardiology onbooard   Continue UF with dialysis as BP tolerates     Type 2 diabetes mellitus (HCC)  Assessment & Plan  Lab Results   Component Value Date    HGBA1C 5.7 (H) 06/09/2024       Recent Labs     07/16/24  1521 07/16/24  2046 07/17/24  0817 07/17/24  1154   POCGLU 117 105 89 99       Blood Sugar Average: Last 72 hrs:  (P) 102    Anemia  Assessment & Plan    Hemoglobin   Date Value Ref Range Status   07/17/2024 9.1 (L) 12.0 - 17.0 g/dL Final   01/23/2017 14.5 12.6 - 17.7 g/dL Final     Hematocrit   Date Value Ref Range Status   07/17/2024 29.0 (L) 36.5 - 49.3 % Final   01/23/2017 43.8 37.5 - 51.0 % Final       Patient has history of anemia of chronic disease, ESRD  CBC on admission showed hgb 6.6 / hct 21.2  Received 2 units packed RBC's in ED. Hbg 7.6 after transfusion  Hematology following with outpatient f/u scheduled 7/11/2024, patient will reschedule due to overlapping hospital admission   On oral iron twice daily  No active bleeding, on heparin for PE, will switch to oral AC after renal biopsy procedure.  7/8/2024 Patient was transfused 1 unit of blood during hemodialysis.  Hemoglobin 8.8, hematocrit 27.1  S/P 3 units packed RBCs  Start IV Venofer 100 mg with each dialysis treatment for total 10 doses starting 7/10/24      Plan:  Continue to monitor hemoglobin levels  Transfuse for Hgb <8.0    Hyponatremia  Assessment & Plan  Lab Results   Component Value Date/Time    SODIUM 131 (L) 07/17/2024 07:35 AM     Likely due to fluid overload.     Plan:  Per nephro, fluid restriction to 1.5 L/day  Continue with hemodialysis  F/u BMP in AM    ANGY (obstructive sleep apnea)  Assessment & Plan  Overnight pulse oximetry obtained: highest O2 sat 99%, his lowest O2 sat 73%, with a mean of 91%. Patient had 120 desaturation events <89%.  He spent an 1 hour and 15 minutes in desaturation (~15.82%) and and 1 hour and 34 minutes with desaturation <89% (~19.77%).  Counseled patient on importance of maintaining oxygen saturation as oxygen deprivation can lead to hypertension, heart failure, coronary artery disease, pulmonary hypertension, etc.    Patient was advised of need to wear oxygen at night upon discharge. He would like to wait for formal evaluation via sleep study as an outpatient, but agrees to wear supplemental oxygen currently.    Chronic acquired lymphedema  Assessment & Plan  Chronic LLE lymphedema x 3 years after surgical resection of LLE cyst    Plan:  Advised compression and elevation of lower extremity for symptomatic edema relief    Acute respiratory failure with hypoxia (HCC)-resolved as of 7/6/2024  Assessment & Plan  Patient O2 saturation on admission was 83% on RA  Was placed on 3/L Nasal canula FiO2 32%  CTA showed right lower lobe subsegmental PE  Was placed on heparin    Plan:  Continue heparin for PE  Incentive Spirometry           VTE Pharmacologic Prophylaxis: VTE Score: 7 High Risk (Score >/= 5) - Pharmacological DVT Prophylaxis Ordered: heparin drip. Sequential Compression Devices Ordered.    Mobility:   Basic Mobility Inpatient Raw Score: 24  JH-HLM Goal: 8: Walk 250 feet or more  JH-HLM Achieved: 3: Sit at edge of bed  JH-HLM Goal achieved. Continue to encourage appropriate mobility.    Patient Centered Rounds: I  performed bedside rounds with nursing staff today.   Discussions with Specialists or Other Care Team Provider: cardiology, nephrology    Education and Discussions with Family / Patient: Patient declined call to .     Current Length of Stay: 13 day(s)  Current Patient Status: Inpatient   Discharge Plan: Anticipate discharge in 48-72 hrs to home.    Code Status: Level 1 - Full Code    Subjective:  Patient was seen and evaluated at bedside. He is currently asymptomatic.  Denying chest pain, shortness of breath, n/v.  He is saturating well wearing 2 L O2 via NC.  His renal biopsy was rescheduled for tomorrow . Current plan discussed with patient. He understands and agrees.    Objective:     Vitals:   Temp (24hrs), Av.8 °F (36.6 °C), Min:97.5 °F (36.4 °C), Max:98.1 °F (36.7 °C)    Temp:  [97.5 °F (36.4 °C)-98.1 °F (36.7 °C)] 97.9 °F (36.6 °C)  HR:  [52-61] 61  Resp:  [18-20] 18  BP: (131-156)/(65-84) 131/69  SpO2:  [91 %-98 %] 96 %  Body mass index is 59.77 kg/m².     Input and Output Summary (last 24 hours):     Intake/Output Summary (Last 24 hours) at 2024 1417  Last data filed at 2024 1300  Gross per 24 hour   Intake 1160 ml   Output 500 ml   Net 660 ml       Physical Exam:   Physical Exam  Vitals and nursing note reviewed.   Constitutional:       General: He is not in acute distress.     Appearance: He is obese. He is not ill-appearing.   HENT:      Head: Normocephalic and atraumatic.      Right Ear: External ear normal.      Left Ear: External ear normal.      Nose: Nose normal.      Mouth/Throat:      Mouth: Mucous membranes are moist.      Pharynx: Oropharynx is clear.   Eyes:      Extraocular Movements: Extraocular movements intact.      Conjunctiva/sclera: Conjunctivae normal.   Cardiovascular:      Rate and Rhythm: Normal rate and regular rhythm.      Pulses: Normal pulses.      Heart sounds: Normal heart sounds. No murmur heard.     No gallop.   Pulmonary:      Effort: Pulmonary  effort is normal. No respiratory distress.      Breath sounds: Normal breath sounds. No wheezing.      Comments: On room air  Abdominal:      General: Bowel sounds are normal. There is no distension.      Palpations: Abdomen is soft.      Tenderness: There is no abdominal tenderness.      Comments: Lymphedema noted to the left lower pannus  Skin under pannus appears irritated with some areas of maceration.  There is also a small draining wound present.   Musculoskeletal:         General: Normal range of motion.      Cervical back: Normal range of motion and neck supple. No rigidity.      Right lower leg: Edema (violaceous skin changes right lower extremity with healing punctate wounds over shin) present.      Left lower leg: Edema (lymphedema) present.      Comments: LLE significantly larger than RLE due to chronic lymphedema. Healed surgical incision noted to left calf.    Skin:     General: Skin is warm and dry.      Comments: R IJV PermCath site clear. Dressing intact.   Neurological:      General: No focal deficit present.      Mental Status: He is alert and oriented to person, place, and time. Mental status is at baseline.   Psychiatric:         Mood and Affect: Mood normal.          Additional Data:     Labs:  Results from last 7 days   Lab Units 07/17/24  0735 07/13/24  0745 07/12/24  0549   WBC Thousand/uL 5.69   < > 6.08   HEMOGLOBIN g/dL 9.1*   < > 8.1*   HEMATOCRIT % 29.0*   < > 25.9*   PLATELETS Thousands/uL 201   < > 239   SEGS PCT %  --   --  65   LYMPHO PCT %  --   --  16   MONO PCT %  --   --  10   EOS PCT %  --   --  7*    < > = values in this interval not displayed.     Results from last 7 days   Lab Units 07/17/24  0735   SODIUM mmol/L 131*   POTASSIUM mmol/L 4.4   CHLORIDE mmol/L 93*   CO2 mmol/L 30   BUN mg/dL 40*   CREATININE mg/dL 9.55*   ANION GAP mmol/L 8   CALCIUM mg/dL 9.1   GLUCOSE RANDOM mg/dL 84     Results from last 7 days   Lab Units 07/12/24  1623   INR  1.06       Results from last  7 days   Lab Units 07/17/24  1154 07/17/24  0817 07/16/24  2046 07/16/24  1521 07/16/24  1119 07/16/24  0804 07/15/24  2119 07/15/24  1146 07/15/24  0819 07/14/24  2125 07/14/24  1712 07/14/24  1116   POC GLUCOSE mg/dl 99 89 105 117 107 87 125 80 83 129 112 104               Lines/Drains:  Invasive Devices       Peripheral Intravenous Line  Duration             Peripheral IV 07/14/24 Left;Medial Forearm 2 days              Hemodialysis Catheter  Duration             HD Permanent Double Catheter 32 days                    Imaging: Reviewed radiology reports from this admission including: chest xray, abdominal/pelvic CT, and ECHO    Recent Cultures (last 7 days):         Last 24 Hours Medication List:   Current Facility-Administered Medications   Medication Dose Route Frequency Provider Last Rate    acetaminophen  650 mg Oral Q6H PRN Mily Martin MD      amLODIPine  10 mg Oral Daily Andrea Longoria MD      atorvastatin  80 mg Oral Daily Mily Martin MD      carvedilol  6.25 mg Oral BID With Meals Andrea Longoria MD      heparin (porcine)  3-30 Units/kg/hr (Order-Specific) Intravenous Titrated Lorri Maria MD 29 Units/kg/hr (07/17/24 1356)    heparin (porcine)  10,000 Units Intravenous Q6H PRN Lorri Maria MD      heparin (porcine)  5,000 Units Intravenous Q6H PRN Lorri Maria MD      hydrALAZINE  10 mg Oral Q8H [facility] Kriss Velazco MD      iron sucrose  100 mg Intravenous After Dialysis Johnny Viramontes MD 0 mg (07/15/24 1311)    isosorbide mononitrate  60 mg Oral Daily Mily Martin MD      labetalol  10 mg Intravenous Q6H PRN Liana Riley MD      lidocaine  1 patch Topical Daily Mily Martin MD      nystatin   Topical BID Lorri Maria MD      ondansetron  4 mg Intravenous Once Cindy Balderas MD      oxyCODONE  10 mg Oral Q4H PRN Lorri Maria MD      oxyCODONE  5 mg Oral Q4H PRN Lorri Maria MD      polyethylene glycol  17 g Oral Daily PRN Diane Sanoh, MD      sevelamer  800 mg Oral TID With Meals  Mily Martin MD      torsemide  100 mg Oral Daily Charles Spear MD          Today, Patient Was Seen By: Deya Palomino MD    **Please Note: This note may have been constructed using a voice recognition system.**     rolling walker

## 2024-07-18 ENCOUNTER — PATIENT OUTREACH (OUTPATIENT)
Dept: FAMILY MEDICINE CLINIC | Facility: CLINIC | Age: 44
End: 2024-07-18

## 2024-07-18 LAB
ANION GAP SERPL CALCULATED.3IONS-SCNC: 6 MMOL/L (ref 4–13)
APTT PPP: 52 SECONDS (ref 23–37)
APTT PPP: 63 SECONDS (ref 23–37)
BUN SERPL-MCNC: 31 MG/DL (ref 5–25)
CALCIUM SERPL-MCNC: 8.7 MG/DL (ref 8.4–10.2)
CHLORIDE SERPL-SCNC: 95 MMOL/L (ref 96–108)
CO2 SERPL-SCNC: 29 MMOL/L (ref 21–32)
CREAT SERPL-MCNC: 7.66 MG/DL (ref 0.6–1.3)
ERYTHROCYTE [DISTWIDTH] IN BLOOD BY AUTOMATED COUNT: 13.7 % (ref 11.6–15.1)
GFR SERPL CREATININE-BSD FRML MDRD: 7 ML/MIN/1.73SQ M
GLUCOSE SERPL-MCNC: 100 MG/DL (ref 65–140)
GLUCOSE SERPL-MCNC: 111 MG/DL (ref 65–140)
GLUCOSE SERPL-MCNC: 122 MG/DL (ref 65–140)
GLUCOSE SERPL-MCNC: 85 MG/DL (ref 65–140)
GLUCOSE SERPL-MCNC: 94 MG/DL (ref 65–140)
HCT VFR BLD AUTO: 28.6 % (ref 36.5–49.3)
HGB BLD-MCNC: 8.9 G/DL (ref 12–17)
MAGNESIUM SERPL-MCNC: 1.9 MG/DL (ref 1.9–2.7)
MCH RBC QN AUTO: 28.9 PG (ref 26.8–34.3)
MCHC RBC AUTO-ENTMCNC: 31.1 G/DL (ref 31.4–37.4)
MCV RBC AUTO: 93 FL (ref 82–98)
PHOSPHATE SERPL-MCNC: 4.5 MG/DL (ref 2.7–4.5)
PLATELET # BLD AUTO: 182 THOUSANDS/UL (ref 149–390)
PMV BLD AUTO: 9.2 FL (ref 8.9–12.7)
POTASSIUM SERPL-SCNC: 4.2 MMOL/L (ref 3.5–5.3)
RBC # BLD AUTO: 3.08 MILLION/UL (ref 3.88–5.62)
SODIUM SERPL-SCNC: 130 MMOL/L (ref 135–147)
WBC # BLD AUTO: 5.56 THOUSAND/UL (ref 4.31–10.16)

## 2024-07-18 PROCEDURE — 84100 ASSAY OF PHOSPHORUS: CPT | Performed by: INTERNAL MEDICINE

## 2024-07-18 PROCEDURE — 99232 SBSQ HOSP IP/OBS MODERATE 35: CPT | Performed by: INTERNAL MEDICINE

## 2024-07-18 PROCEDURE — 82948 REAGENT STRIP/BLOOD GLUCOSE: CPT

## 2024-07-18 PROCEDURE — 80048 BASIC METABOLIC PNL TOTAL CA: CPT | Performed by: INTERNAL MEDICINE

## 2024-07-18 PROCEDURE — 83735 ASSAY OF MAGNESIUM: CPT | Performed by: INTERNAL MEDICINE

## 2024-07-18 PROCEDURE — 85730 THROMBOPLASTIN TIME PARTIAL: CPT | Performed by: INTERNAL MEDICINE

## 2024-07-18 PROCEDURE — 85027 COMPLETE CBC AUTOMATED: CPT | Performed by: INTERNAL MEDICINE

## 2024-07-18 RX ORDER — HYDRALAZINE HYDROCHLORIDE 25 MG/1
25 TABLET, FILM COATED ORAL EVERY 8 HOURS SCHEDULED
Status: DISCONTINUED | OUTPATIENT
Start: 2024-07-18 | End: 2024-07-19

## 2024-07-18 RX ADMIN — SEVELAMER HYDROCHLORIDE 800 MG: 800 TABLET ORAL at 12:07

## 2024-07-18 RX ADMIN — HYDRALAZINE HYDROCHLORIDE 10 MG: 10 TABLET, FILM COATED ORAL at 05:16

## 2024-07-18 RX ADMIN — OXYCODONE HYDROCHLORIDE 10 MG: 10 TABLET ORAL at 13:30

## 2024-07-18 RX ADMIN — ATORVASTATIN CALCIUM 80 MG: 40 TABLET, FILM COATED ORAL at 08:51

## 2024-07-18 RX ADMIN — OXYCODONE HYDROCHLORIDE 5 MG: 5 TABLET ORAL at 19:52

## 2024-07-18 RX ADMIN — CARVEDILOL 6.25 MG: 6.25 TABLET, FILM COATED ORAL at 17:57

## 2024-07-18 RX ADMIN — SEVELAMER HYDROCHLORIDE 800 MG: 800 TABLET ORAL at 08:51

## 2024-07-18 RX ADMIN — OXYCODONE HYDROCHLORIDE 5 MG: 5 TABLET ORAL at 05:15

## 2024-07-18 RX ADMIN — OXYCODONE HYDROCHLORIDE 10 MG: 10 TABLET ORAL at 21:58

## 2024-07-18 RX ADMIN — CARVEDILOL 6.25 MG: 6.25 TABLET, FILM COATED ORAL at 08:51

## 2024-07-18 RX ADMIN — ISOSORBIDE MONONITRATE 60 MG: 60 TABLET, EXTENDED RELEASE ORAL at 08:51

## 2024-07-18 RX ADMIN — HYDRALAZINE HYDROCHLORIDE 25 MG: 25 TABLET ORAL at 21:58

## 2024-07-18 RX ADMIN — OXYCODONE HYDROCHLORIDE 10 MG: 10 TABLET ORAL at 08:51

## 2024-07-18 RX ADMIN — OXYCODONE HYDROCHLORIDE 10 MG: 10 TABLET ORAL at 17:57

## 2024-07-18 RX ADMIN — TORSEMIDE 100 MG: 100 TABLET ORAL at 08:51

## 2024-07-18 RX ADMIN — HEPARIN SODIUM 27 UNITS/KG/HR: 10000 INJECTION, SOLUTION INTRAVENOUS at 05:14

## 2024-07-18 RX ADMIN — SEVELAMER HYDROCHLORIDE 800 MG: 800 TABLET ORAL at 17:57

## 2024-07-18 RX ADMIN — HEPARIN SODIUM 29 UNITS/KG/HR: 10000 INJECTION, SOLUTION INTRAVENOUS at 11:43

## 2024-07-18 RX ADMIN — OXYCODONE HYDROCHLORIDE 5 MG: 5 TABLET ORAL at 10:39

## 2024-07-18 RX ADMIN — OXYCODONE HYDROCHLORIDE 10 MG: 10 TABLET ORAL at 03:16

## 2024-07-18 RX ADMIN — OXYCODONE HYDROCHLORIDE 5 MG: 5 TABLET ORAL at 00:14

## 2024-07-18 RX ADMIN — HYDRALAZINE HYDROCHLORIDE 25 MG: 25 TABLET ORAL at 13:30

## 2024-07-18 RX ADMIN — AMLODIPINE BESYLATE 10 MG: 10 TABLET ORAL at 08:51

## 2024-07-18 RX ADMIN — HEPARIN SODIUM 5000 UNITS: 1000 INJECTION INTRAVENOUS; SUBCUTANEOUS at 06:45

## 2024-07-18 NOTE — PLAN OF CARE
Problem: Potential for Falls  Goal: Patient will remain free of falls  Description: INTERVENTIONS:  - Educate patient/family on patient safety including physical limitations  - Instruct patient to call for assistance with activity   - Consult OT/PT to assist with strengthening/mobility   - Keep Call bell within reach  - Keep bed low and locked with side rails adjusted as appropriate  - Keep care items and personal belongings within reach  - Initiate and maintain comfort rounds  - Make Fall Risk Sign visible to staff  - Apply yellow socks and bracelet for high fall risk patients  - Consider moving patient to room near nurses station  Outcome: Progressing     Problem: METABOLIC, FLUID AND ELECTROLYTES - ADULT  Goal: Electrolytes maintained within normal limits  Description: INTERVENTIONS:  - Monitor labs and assess patient for signs and symptoms of electrolyte imbalances  - Administer electrolyte replacement as ordered  - Monitor response to electrolyte replacements, including repeat lab results as appropriate  - Instruct patient on fluid and nutrition as appropriate  Outcome: Progressing  Goal: Fluid balance maintained  Description: INTERVENTIONS:  - Monitor labs   - Monitor I/O and WT  - Instruct patient on fluid and nutrition as appropriate  - Assess for signs & symptoms of volume excess or deficit  Outcome: Progressing     Problem: Prexisting or High Potential for Compromised Skin Integrity  Goal: Skin integrity is maintained or improved  Description: INTERVENTIONS:  - Identify patients at risk for skin breakdown  - Assess and monitor skin integrity  - Assess and monitor nutrition and hydration status  - Monitor labs   - Assess for incontinence   - Turn and reposition patient  - Assist with mobility/ambulation  - Relieve pressure over bony prominences  - Avoid friction and shearing  - Provide appropriate hygiene as needed including keeping skin clean and dry  - Evaluate need for skin moisturizer/barrier cream  -  Collaborate with interdisciplinary team   - Patient/family teaching  - Consider wound care consult   Outcome: Progressing     Problem: PAIN - ADULT  Goal: Verbalizes/displays adequate comfort level or baseline comfort level  Description: Interventions:  - Encourage patient to monitor pain and request assistance  - Assess pain using appropriate pain scale  - Administer analgesics based on type and severity of pain and evaluate response  - Implement non-pharmacological measures as appropriate and evaluate response  - Consider cultural and social influences on pain and pain management  - Notify physician/advanced practitioner if interventions unsuccessful or patient reports new pain  Outcome: Progressing     Problem: INFECTION - ADULT  Goal: Absence or prevention of progression during hospitalization  Description: INTERVENTIONS:  - Assess and monitor for signs and symptoms of infection  - Monitor lab/diagnostic results  - Monitor all insertion sites, i.e. indwelling lines, tubes, and drains  - Monitor endotracheal if appropriate and nasal secretions for changes in amount and color  - [place] appropriate cooling/warming therapies per order  - Administer medications as ordered  - Instruct and encourage patient and family to use good hand hygiene technique  - Identify and instruct in appropriate isolation precautions for identified infection/condition  Outcome: Progressing  Goal: Absence of fever/infection during neutropenic period  Description: INTERVENTIONS:  - Monitor WBC    Outcome: Progressing     Problem: SAFETY ADULT  Goal: Patient will remain free of falls  Description: INTERVENTIONS:  - Educate patient/family on patient safety including physical limitations  - Instruct patient to call for assistance with activity   - Consult OT/PT to assist with strengthening/mobility   - Keep Call bell within reach  - Keep bed low and locked with side rails adjusted as appropriate  - Keep care items and personal belongings  within reach  - Initiate and maintain comfort rounds  - Make Fall Risk Sign visible to staff  - Apply yellow socks and bracelet for high fall risk patients  - Consider moving patient to room near nurses station  Outcome: Progressing  Goal: Maintain or return to baseline ADL function  Description: INTERVENTIONS:  -  Assess patient's ability to carry out ADLs; assess patient's baseline for ADL function and identify physical deficits which impact ability to perform ADLs (bathing, care of mouth/teeth, toileting, grooming, dressing, etc.)  - Assess/evaluate cause of self-care deficits   - Assess range of motion  - Assess patient's mobility; develop plan if impaired  - Assess patient's need for assistive devices and provide as appropriate  - Encourage maximum independence but intervene and supervise when necessary  - Involve family in performance of ADLs  - Assess for home care needs following discharge   - Consider OT consult to assist with ADL evaluation and planning for discharge  - Provide patient education as appropriate  Outcome: Progressing  Goal: Maintains/Returns to pre admission functional level  Description: INTERVENTIONS:  - Perform AM-PAC 6 Click Basic Mobility/ Daily Activity assessment daily.  - Set and communicate daily mobility goal to care team and patient/family/caregiver.   - Collaborate with rehabilitation services on mobility goals if consulted  - Perform Range of Motion   - Out of bed for toileting  - Record patient progress and toleration of activity level   Outcome: Progressing     Problem: DISCHARGE PLANNING  Goal: Discharge to home or other facility with appropriate resources  Description: INTERVENTIONS:  - Identify barriers to discharge w/patient and caregiver  - Arrange for needed discharge resources and transportation as appropriate  - Identify discharge learning needs (meds, wound care, etc.)  - Arrange for interpretive services to assist at discharge as needed  - Refer to Case Management  Department for coordinating discharge planning if the patient needs post-hospital services based on physician/advanced practitioner order or complex needs related to functional status, cognitive ability, or social support system  Outcome: Progressing     Problem: Knowledge Deficit  Goal: Patient/family/caregiver demonstrates understanding of disease process, treatment plan, medications, and discharge instructions  Description: Complete learning assessment and assess knowledge base.  Interventions:  - Provide teaching at level of understanding  - Provide teaching via preferred learning methods  Outcome: Progressing

## 2024-07-18 NOTE — PROGRESS NOTES
NEPHROLOGY HOSPITAL PROGRESS NOTE   Joaquin Frankel 44 y.o. male MRN: [id number]  Unit/Bed#: S -01 Encounter: [id number]  Reason for Consult: CLOVIS on dialysis    ASSESSMENT and PLAN:    44-year-old male with a past medical history of CKD stage III with acute kidney injury on prior admission in June started on hemodialysis June 7, 2024, hypertension, monoclonal gammopathy, who is now presenting to the hospital early July with chest pain and concern for PE on admission.  Nephrology is on board for CLOVIS requiring dialysis     1-acute kidney injury currently on dialysis-present on admission.  Underlying CKD stage III.  Baseline creatinine 1.2 to 1/dL     - Had CLOVIS started on dialysis June 7, 2024  - Outpatient unit [facility]  - Access-right IJ PermCath  - Etiology-unclear.  MGR S versus other  - Does not have signs of renal recovery as of yet  - Noted attempted biopsy last week but unsuccessful due to respiratory status.  Therefore patient is being planned for renal biopsy July 15 with anesthesia present  Which was not able to be completed due to her respiratory status with hypoxia  - Prior bone marrow biopsy June 12 did not reveal a plasma cell neoplasm  - July 15 15th-dialysis completed with 4 L ultrafiltration.  Biopsy cannot be completed for renal biopsy due to hypoxia  - July 16-no urgent indication for dialysis we will plan for dialysis on July 17.  Started on hydralazine for hypertension.  - July 17-dialysis planned for today.  Dialysis completed with 3.5 L ultrafiltration.  Treatment was shortened to 3 hours due to staffing issues     Plan  - Noted hypoxia may be related to ANGY/hypoventilation syndrome.  - Heparin drip management per primary team  - Coordination of heparin drip with IR team will be by primary team once biopsy is scheduled  - Lab work again in a.m.  - Increase hydralazine  - Post renal biopsy will need CBC-ordered for tomorrow evening  - Discussed case with primary team resident  "in person.  We are in agreement for dialysis tomorrow  - I have notified dialysis unit of potential biopsy tomorrow  - I had discussions with IR attending yesterday, the attending will be on service at [facility] [place] for Friday, and they will be planning and coordinating a renal biopsy for Friday.  We are going to attempt local anesthesia and very light sedation with ultrasound guided biopsy to start to attempt to limit need for potential intubation.     Portions of the record may have been created with voice recognition software. Occasional wrong word or \"sound a like\" substitutions may have occurred due to the inherent limitations of voice recognition software. Read the chart carefully and recognize, using context, where substitutions have occurred.If you have any questions, please contact the dictating provider.        2-tunneled dialysis catheter site-very minimal erythema noted at insertion site.  Will plan to monitor for now.  This site appears improved from overall     3-electrolytes-monitor with dialysis-sodium borderline low but relatively unchanged at 130 on 7/18.  Continue to monitor with ultrafiltration.     4-acid/base-monitor with dialysis.  Appropriate for now     5-chest pain-resolved.  With concern for underlying coronary artery disease being medically managed for now with cardiology team     6-volume overload-ultrafiltrate with dialysis as tolerates.     - On torsemide 100 mg daily     7-anemia-no JOSSELYN due to acute PE.  Hemoglobin at goal for biopsy above 8.     On IV iron     8-nephrotic range proteinuria-plan for biopsy      - Serological workup negative except for IgG kappa on SPEP and UPEP  - Renal biopsy planning in progress     9-hypertension-on amlodipine, carvedilol, torsemide.  Not able to increase carvedilol due to bradycardia     - Add hydralazine to her regimen July 16, titrate higher 7/18  - Blood pressure is appropriately improving       SUBJECTIVE / 24H INTERVAL HISTORY:  Patient " denies complaints.  Blood pressure is 1 40-1 50 systolic.    OBJECTIVE:  Current Weight: Weight - Scale: (!) 171 kg (376 lb 5.2 oz)  Vitals:    07/17/24 2120 07/18/24 0514 07/18/24 0542 07/18/24 0738   BP: 150/75 145/75  140/60   BP Location:       Pulse: (!) 52 58  (!) 54   Resp:       Temp: 98 °F (36.7 °C)   (!) 97.4 °F (36.3 °C)   TempSrc:       SpO2: 92% 98%  100%   Weight:   (!) 171 kg (376 lb 5.2 oz)    Height:           Intake/Output Summary (Last 24 hours) at 7/18/2024 0953  Last data filed at 7/18/2024 0542  Gross per 24 hour   Intake 1260 ml   Output 4641 ml   Net -3381 ml     General: NAD  Skin: no rash  Eyes: anicteric sclera  ENT: moist mucous membrane  Neck: supple  Chest: CTA b/l, no ronchii, no wheeze, no rubs, no rales  CVS: s1s2, no murmur, no gallop, no rub  Abdomen: soft, nontender, nl sounds  Extremities: Unchanged 3-4+, edema LE bl/, tunneled dialysis catheter site with very minimal erythema,  improved  : no lewis  Neuro: AAOX3  Psych: normal affect    Medications:    Current Facility-Administered Medications:     acetaminophen (TYLENOL) tablet 650 mg, 650 mg, Oral, Q6H PRN, Mily Martin MD, 650 mg at 07/11/24 1950    amLODIPine (NORVASC) tablet 10 mg, 10 mg, Oral, Daily, Andrea Longoria MD, 10 mg at 07/18/24 0851    atorvastatin (LIPITOR) tablet 80 mg, 80 mg, Oral, Daily, Mily Martin MD, 80 mg at 07/18/24 0851    carvedilol (COREG) tablet 6.25 mg, 6.25 mg, Oral, BID With Meals, Andrea Longoria MD, 6.25 mg at 07/18/24 0851    heparin (porcine) 25,000 units in 0.45% NaCl 250 mL infusion (premix), 3-30 Units/kg/hr (Order-Specific), Intravenous, Titrated, Lorri Maria MD, Last Rate: 36.3 mL/hr at 07/18/24 0646, 29 Units/kg/hr at 07/18/24 0646    heparin (porcine) injection 10,000 Units, 10,000 Units, Intravenous, Q6H PRN, Lorri Maria MD    heparin (porcine) injection 5,000 Units, 5,000 Units, Intravenous, Q6H PRN, Lorri Maria MD, 5,000 Units at 07/18/24 0645    hydrALAZINE (APRESOLINE) tablet 10  mg, 10 mg, Oral, Q8H SHAZIA, Kriss Velazco MD, 10 mg at 07/18/24 0516    iron sucrose (VENOFER) 100 mg in sodium chloride 0.9 % 100 mL IVPB, 100 mg, Intravenous, After Dialysis, Johnny Viramontes MD, Last Rate: 0 mL/hr at 07/15/24 1311, 100 mg at 07/17/24 1358    isosorbide mononitrate (IMDUR) 24 hr tablet 60 mg, 60 mg, Oral, Daily, Mily Martin MD, 60 mg at 07/18/24 0851    labetalol (NORMODYNE) injection 10 mg, 10 mg, Intravenous, Q6H PRN, Liana Riley MD    lidocaine (LIDODERM) 5 % patch 1 patch, 1 patch, Topical, Daily, Mily Martin MD    nystatin (MYCOSTATIN) powder, , Topical, BID, Lorri Maria MD, Given at 07/17/24 1715    ondansetron (ZOFRAN) injection 4 mg, 4 mg, Intravenous, Once, Cindy Balderas MD    oxyCODONE (ROXICODONE) immediate release tablet 10 mg, 10 mg, Oral, Q4H PRN, Lorri Maria MD, 10 mg at 07/18/24 0851    oxyCODONE (ROXICODONE) IR tablet 5 mg, 5 mg, Oral, Q4H PRN, Lorri Maria MD, 5 mg at 07/18/24 0515    polyethylene glycol (MIRALAX) packet 17 g, 17 g, Oral, Daily PRN, Diane Oliveira MD    sevelamer (RENAGEL) tablet 800 mg, 800 mg, Oral, TID With Meals, Mily Martin MD, 800 mg at 07/18/24 0851    torsemide (DEMADEX) tablet 100 mg, 100 mg, Oral, Daily, Charles Spear MD, 100 mg at 07/18/24 0851    Laboratory Results:  Results from last 7 days   Lab Units 07/18/24  0535 07/17/24  0735 07/16/24  0549 07/15/24  0459 07/13/24  0745 07/12/24  0549   WBC Thousand/uL 5.56 5.69 5.84 6.60 5.99 6.08   HEMOGLOBIN g/dL 8.9* 9.1* 8.7* 8.3* 9.4* 8.1*   HEMATOCRIT % 28.6* 29.0* 27.3* 26.6* 30.0* 25.9*   PLATELETS Thousands/uL 182 201 175 236 261 239   POTASSIUM mmol/L 4.2 4.4 4.3 4.6 4.4 4.3   CHLORIDE mmol/L 95* 93* 95* 96 95* 95*   CO2 mmol/L 29 30 30 27 27 27   BUN mg/dL 31* 40* 32* 46* 27* 39*   CREATININE mg/dL 7.66* 9.55* 8.00* 10.06* 7.33* 8.84*   CALCIUM mg/dL 8.7 9.1 8.8 9.1 9.3 9.0   MAGNESIUM mg/dL 1.9 2.1 1.8*  --   --   --    PHOSPHORUS mg/dL 4.5 5.4* 4.3  --   --   --

## 2024-07-18 NOTE — PROCEDURES
Venous Access Line Insertion    Date/Time: 7/18/2024 12:47 PM    Performed by: Faviola Melgar RN  Authorized by: Rocco Aponte MD    Patient location:  Bedside  Consent:     Consent obtained:  Verbal    Consent given by:  Patient  Universal protocol:     Procedure explained and questions answered to patient or proxy's satisfaction: yes    Pre-procedure details:     Hand hygiene: Hand hygiene performed prior to insertion      Sterile barrier technique: All elements of maximal sterile technique followed      Skin preparation:  ChloraPrep    Skin preparation agent: Skin preparation agent completely dried prior to procedure    Procedure details:     Complex Venous Access Line Type: US Guided Peripheral IV      Orientation:  Right    Location:  Forearm    Catheter size:  20 gauge    Approach: percutaneous technique used      Patient position:  Flat    Sterile ultrasound techniques: Sterile gel and sterile probe covers were used      Number of attempts:  1    Successful placement: yes    Anesthesia (see MAR for exact dosages):     Anesthesia method:  None  Post-procedure details:     Post-procedure:  Dressing applied    Assessment:  Blood return through all ports    Post-procedure complications: none      Patient tolerance of procedure:  Tolerated well, no immediate complications

## 2024-07-18 NOTE — PROGRESS NOTES
[facility]  Progress Note  Name: Joaquin Frankel I  MRN: [id number]  Unit/Bed#: S -01 I Date of Admission: 7/4/2024   Date of Service: 7/18/2024 I Hospital Day: 14    Assessment & Plan   * ESRD (end stage renal disease) on dialysis (HCC)  Assessment & Plan  Lab Results   Component Value Date    EGFR 5 07/17/2024    EGFR 7 07/16/2024    EGFR 5 07/15/2024    CREATININE 9.55 (H) 07/17/2024    CREATININE 8.00 (H) 07/16/2024    CREATININE 10.06 (H) 07/15/2024     Patient ESRD on Dialysis 3 times a week, MWF  Elevated , c/o of orthopnea   Etiology of CLOVIS: Etiology not entirely clear - Possible MGRS? Pattern of disease progression is not typical for diabetic nephropathy.   Etiology of CKD: Suspected due to diabetic nephropathy, hypertensive nephrosclerosis, obesity related renal dysfunction  Previous baseline creatinine 1.3-1.6 w/ prior episodes of CLOVIS  Serologic workup negative except for IgG kappa on SPEP and UPEP.  Cryoglobulin negative, ANCA negative, anti-dsDNA negative, anti-GBM negative, JOSE ROBERTO negative, C4 34, C3 118  Bone marrow biopsy 6/12/2024: negative for plasma cell neoplasm.   CRRT initiated 6/7/2024. Peak creatinine 10.4  Last dialyzed 7/17/2024   Dry weight decreased to 172.5 kg   electrolytes and acid/base stable  7/12 renal biopsy was not performed due to hypertension despite sedation and antihypertensive medications. Rescheduled for Monday 7/15/24  7/15 renal biopsy was not completed due to oxygen saturation of 76-89% during dialysis.  Likely due to ANGY/OHS.  Agreed to wear oxygen via nasal cannula.    Plan:  Nephrology onboard  Hold heparin tomorrow morning, 7/19, for upcoming renal biopsy.  Biopsy will be attempted under local anesthesia and very light sedation with ultrasound-guidance in an attempt to limit need for possible intubation  Obtain CBC post biopsy  Hemodialysis tomorrow  Hold oral AC. Resume ASA 81 mg daily after renal biopsy   HD per  nephro.  Continue torsemide 100 mg daily    Pulmonary embolism (HCC)  Assessment & Plan  Patient presented for worsening chest pain, shortness of breath, and dyspnea on exertion.  Patient was admitted for similar chest pain in early June and was found to have ESRD, started on HD MWF   D-Dimer 4.21  CTA showed subsegmental right lower lobe pulmonary embolism  Placed on heparin drip  7/12/24 Heparin was subtherapeutic this morning, PTT 55. No bolus was given or increase in gtt as patient will have the gtt stopped at 10 am pending IR procedure.  Saturating well on 2 L via nasal cannula    Plan  Hold heparin tomorrow morning, 7/19, for upcoming renal biopsy   Transition to warfarin 5 mg daily after renal biopsy     Hypertension  Assessment & Plan  History of HTN, recent medication change after dx of ESRD  Workup at that time notable for IgG kappa monoclonal gammopathy and s/p bone marrow biopsy on 6/12/2023 to assess for multiple myeloma. Bone marrow biopsy with no evidence of plasma cell neoplasm.   HD on MWF schedule.   Presented to ED with uncontrolled /80. Endorsed headaches and blurry vision in his left eye in the ED that has since resolved.      Plan:  Increase hydralazine to 25 mg q8hrs, per nephro  Continue amlodipine 10 mg daily, carvedilol 6.25 mg twice daily, Imdur 60 mg once daily, torsemide 100 mg daily  Hold carvedilol if HR <50  Nephrology and cardiology onbooard   Continue UF with dialysis as BP tolerates     Chest pain  Assessment & Plan  Patient presented with worsening substernal chest pain over several weeks.  H/o chronic angina, CAD, family history of hereditary heart disease on fathers side.   Patient was admitted for similar chest pain in early June and was found to have ESRD, started on HD MWF.  Recent nuclear stress test without significant reversible ischemia  EKG on admission: Sinus Rhythm with PAC's. Repeat EKG showed sinus bradycardia  High-sensitivity troponin negative x 5   .  D-Dimer 4.21  CTA: subsegmental right lower lobe pulmonary embolism  07/05/24: Updated echocardiogram on unremarkable  Patient blood pressure elevated on admission to 194/80, currently stable.  Patient received 2 doses of nitroglycerin in ED and reports it did not improve his pain, but it improved his elevated blood pressure.  Patient was saturating low 80s on room air on admission and required 3L supplemental O2, which improved saturation to 90s. O2 Sat currently 94% to 95% on room air.  MRSA negative. Viral panel negative.  Multifactorial from acute on chronic anemia, hypertensive urgency, and acute PE   Patient's chest pain has resolved following hemodialysis and the improvement of high blood pressure.   7/9: Telemetry discontinued    Plan-  Per cardiology recs: Continue amlodipine 5 mg daily,Coreg 6.25 mg BID, Imdur 60 mg daily, atorvastatin 80 mg daily. Holding aspirin 81 mg daily    Type 2 diabetes mellitus (HCC)  Assessment & Plan  Lab Results   Component Value Date    HGBA1C 5.7 (H) 06/09/2024       Recent Labs     07/17/24  1522 07/17/24  2119 07/18/24  0737 07/18/24  1105   POCGLU 120 118 85 100       Blood Sugar Average: Last 72 hrs:  (P) [id number]    Anemia  Assessment & Plan    Hemoglobin   Date Value Ref Range Status   07/18/2024 8.9 (L) 12.0 - 17.0 g/dL Final   01/23/2017 14.5 12.6 - 17.7 g/dL Final     Hematocrit   Date Value Ref Range Status   07/18/2024 28.6 (L) 36.5 - 49.3 % Final   01/23/2017 43.8 37.5 - 51.0 % Final       Patient has history of anemia of chronic disease, ESRD  CBC on admission showed hgb 6.6 / hct 21.2  Received 2 units packed RBC's in ED. Hbg 7.6 after transfusion  Hematology following with outpatient f/u scheduled 7/11/2024, patient will reschedule due to overlapping hospital admission   On oral iron twice daily  No active bleeding, on heparin for PE, will switch to oral AC after renal biopsy procedure.  7/8/2024 Patient was transfused 1 unit of blood during  hemodialysis.  Hemoglobin 8.8, hematocrit 27.1  S/P 3 units packed RBCs  Start IV Venofer 100 mg with each dialysis treatment for total 10 doses starting 7/10/24     Plan:  Continue to monitor hemoglobin levels  Transfuse for Hgb <8.0    Hyponatremia  Assessment & Plan  Lab Results   Component Value Date/Time    SODIUM 130 (L) 07/18/2024 05:35 AM     Likely due to fluid overload.     Plan:  Per nephro, fluid restriction to 1.5 L/day  Continue with hemodialysis  F/u BMP in AM    ANGY (obstructive sleep apnea)  Assessment & Plan  Overnight pulse oximetry obtained: highest O2 sat 99%, his lowest O2 sat 73%, with a mean of 91%. Patient had 120 desaturation events <89%.  He spent an 1 hour and 15 minutes in desaturation (~15.82%) and and 1 hour and 34 minutes with desaturation <89% (~19.77%).  Counseled patient on importance of maintaining oxygen saturation as oxygen deprivation can lead to hypertension, heart failure, coronary artery disease, pulmonary hypertension, etc.    Patient was advised of need to wear oxygen at night upon discharge. He would like to wait for formal evaluation via sleep study as an outpatient, but agrees to wear supplemental oxygen currently.    Chronic acquired lymphedema  Assessment & Plan  Chronic LLE lymphedema x 3 years after surgical resection of LLE cyst    Plan:  Advised compression and elevation of lower extremity for symptomatic edema relief    Acute respiratory failure with hypoxia (HCC)-resolved as of 7/6/2024  Assessment & Plan  Patient O2 saturation on admission was 83% on RA  Was placed on 3/L Nasal canula FiO2 32%  CTA showed right lower lobe subsegmental PE  Was placed on heparin    Plan:  On heparin drip that will be held for renal biopsy  Incentive Spirometry           VTE Pharmacologic Prophylaxis: VTE Score: 7 High Risk (Score >/= 5) - Pharmacological DVT Prophylaxis Ordered: heparin drip. Sequential Compression Devices Ordered.    Mobility:   Basic Mobility Inpatient Raw  Score: 24  JH-HLM Goal: 8: Walk 250 feet or more  JH-HLM Achieved: 7: Walk 25 feet or more  JH-HLM Goal achieved. Continue to encourage appropriate mobility.    Patient Centered Rounds: I performed bedside rounds with nursing staff today.   Discussions with Specialists or Other Care Team Provider: cardiology, nephrology    Education and Discussions with Family / Patient: Patient declined call to .     Current Length of Stay: 14 day(s)  Current Patient Status: Inpatient   Discharge Plan: Anticipate discharge in 48-72 hrs to home.    Code Status: Level 1 - Full Code    Subjective:  Patient was seen and evaluated at bedside.  No overnight events. He reports some brief nausea this morning after eating breakfast which he attributes to eating too fast.  He declines an antiemetic. Denies chest pain, shortness of breath, vomiting.  Patient removed his nasal cannula to eat breakfast, but put it back on after prompting. His renal biopsy was rescheduled for tomorrow  with anesthesia as backup. Current plan discussed with patient. He understands and agrees.    Objective:     Vitals:   Temp (24hrs), Av.9 °F (36.6 °C), Min:97.4 °F (36.3 °C), Max:98.2 °F (36.8 °C)    Temp:  [97.4 °F (36.3 °C)-98.2 °F (36.8 °C)] 97.4 °F (36.3 °C)  HR:  [52-58] 54  Resp:  [16] 16  BP: (140-159)/(60-75) 140/60  SpO2:  [92 %-100 %] 100 %  Body mass index is 58.94 kg/m².     Input and Output Summary (last 24 hours):     Intake/Output Summary (Last 24 hours) at 2024 1502  Last data filed at 2024 1300  Gross per 24 hour   Intake 1000 ml   Output 640 ml   Net 360 ml       Physical Exam:   Physical Exam  Vitals and nursing note reviewed.   Constitutional:       General: He is not in acute distress.     Appearance: He is obese. He is not ill-appearing.   HENT:      Head: Normocephalic and atraumatic.      Right Ear: External ear normal.      Left Ear: External ear normal.      Nose: Nose normal.      Mouth/Throat:      Mouth:  Mucous membranes are moist.      Pharynx: Oropharynx is clear.   Eyes:      Extraocular Movements: Extraocular movements intact.      Conjunctiva/sclera: Conjunctivae normal.   Cardiovascular:      Rate and Rhythm: Normal rate and regular rhythm.      Pulses: Normal pulses.      Heart sounds: Normal heart sounds. No murmur heard.     No gallop.   Pulmonary:      Effort: Pulmonary effort is normal. No respiratory distress.      Breath sounds: Normal breath sounds. No wheezing.   Abdominal:      General: Bowel sounds are normal. There is no distension.      Palpations: Abdomen is soft.      Tenderness: There is no abdominal tenderness.      Comments: Lymphedema noted to the left lower pannus  Skin under pannus appears irritated with some areas of maceration.  There is also a small draining wound present.   Musculoskeletal:         General: Normal range of motion.      Cervical back: Normal range of motion and neck supple. No rigidity.      Right lower leg: Edema (violaceous skin changes right lower extremity with healing punctate wounds over shin) present.      Left lower leg: Edema (lymphedema) present.      Comments: LLE significantly larger than RLE due to chronic lymphedema. Healed surgical incision noted to left calf.    Skin:     General: Skin is warm and dry.      Comments: R IJV PermCath site clear. Dressing intact.   Neurological:      General: No focal deficit present.      Mental Status: He is alert and oriented to person, place, and time. Mental status is at baseline.   Psychiatric:         Mood and Affect: Mood normal.          Additional Data:     Labs:  Results from last 7 days   Lab Units 07/18/24  0535 07/13/24  0745 07/12/24  0549   WBC Thousand/uL 5.56   < > 6.08   HEMOGLOBIN g/dL 8.9*   < > 8.1*   HEMATOCRIT % 28.6*   < > 25.9*   PLATELETS Thousands/uL 182   < > 239   SEGS PCT %  --   --  65   LYMPHO PCT %  --   --  16   MONO PCT %  --   --  10   EOS PCT %  --   --  7*    < > = values in this  interval not displayed.     Results from last 7 days   Lab Units 07/18/24  0535   SODIUM mmol/L 130*   POTASSIUM mmol/L 4.2   CHLORIDE mmol/L 95*   CO2 mmol/L 29   BUN mg/dL 31*   CREATININE mg/dL 7.66*   ANION GAP mmol/L 6   CALCIUM mg/dL 8.7   GLUCOSE RANDOM mg/dL 94     Results from last 7 days   Lab Units 07/12/24  1623   INR  1.06       Results from last 7 days   Lab Units 07/18/24  1105 07/18/24  0737 07/17/24  2119 07/17/24  1522 07/17/24  1154 07/17/24  0817 07/16/24  2046 07/16/24  1521 07/16/24  1119 07/16/24  0804 07/15/24  2119 07/15/24  1146   POC GLUCOSE mg/dl 100 85 118 120 99 89 105 117 107 87 125 80               Lines/Drains:  Invasive Devices       Peripheral Intravenous Line  Duration             Peripheral IV 07/14/24 Left;Medial Forearm 3 days    Peripheral IV 07/18/24 Right Forearm <1 day              Hemodialysis Catheter  Duration             HD Permanent Double Catheter 33 days                    Imaging: Reviewed radiology reports from this admission including: chest xray, abdominal/pelvic CT, and ECHO    Recent Cultures (last 7 days):         Last 24 Hours Medication List:   Current Facility-Administered Medications   Medication Dose Route Frequency Provider Last Rate    acetaminophen  650 mg Oral Q6H PRN Mily Martin MD      amLODIPine  10 mg Oral Daily Andrea Longoria MD      atorvastatin  80 mg Oral Daily Mily Martin MD      carvedilol  6.25 mg Oral BID With Meals Andrea Longoria MD      heparin (porcine)  3-30 Units/kg/hr (Order-Specific) Intravenous Titrated Lorri Maria MD 29 Units/kg/hr (07/18/24 1143)    heparin (porcine)  10,000 Units Intravenous Q6H PRN Lorri Maria MD      heparin (porcine)  5,000 Units Intravenous Q6H PRN Lorri Maria MD      hydrALAZINE  25 mg Oral Q8H [facility] Kriss Velazco MD      iron sucrose  100 mg Intravenous After Dialysis Johnny Viramontes MD 0 mg (07/15/24 1311)    isosorbide mononitrate  60 mg Oral Daily Mily Martin MD      labetalol  10 mg  Intravenous Q6H PRN Liana Riley MD      lidocaine  1 patch Topical Daily Mily Martin MD      nystatin   Topical BID Lorri Maria MD      ondansetron  4 mg Intravenous Once Cindy Balderas MD      oxyCODONE  10 mg Oral Q4H PRN Lorri Maria MD      oxyCODONE  5 mg Oral Q4H PRN Lorri Maria MD      polyethylene glycol  17 g Oral Daily PRN Diane Oliveira MD      sevelamer  800 mg Oral TID With Meals Mily Martin MD      torsemide  100 mg Oral Daily Charles Spear MD          Today, Patient Was Seen By: Deya Palomino MD    **Please Note: This note may have been constructed using a voice recognition system.**

## 2024-07-19 ENCOUNTER — ANESTHESIA (OUTPATIENT)
Dept: ANESTHESIOLOGY | Facility: HOSPITAL | Age: 44
End: 2024-07-19

## 2024-07-19 ENCOUNTER — APPOINTMENT (INPATIENT)
Dept: DIALYSIS | Facility: HOSPITAL | Age: 44
DRG: 175 | End: 2024-07-19
Payer: COMMERCIAL

## 2024-07-19 ENCOUNTER — ANESTHESIA EVENT (OUTPATIENT)
Dept: ANESTHESIOLOGY | Facility: HOSPITAL | Age: 44
End: 2024-07-19

## 2024-07-19 ENCOUNTER — ANESTHESIA (INPATIENT)
Dept: CT IMAGING | Facility: HOSPITAL | Age: 44
DRG: 175 | End: 2024-07-19
Payer: COMMERCIAL

## 2024-07-19 ENCOUNTER — APPOINTMENT (INPATIENT)
Dept: CT IMAGING | Facility: HOSPITAL | Age: 44
DRG: 175 | End: 2024-07-19
Attending: STUDENT IN AN ORGANIZED HEALTH CARE EDUCATION/TRAINING PROGRAM
Payer: COMMERCIAL

## 2024-07-19 ENCOUNTER — ANESTHESIA EVENT (INPATIENT)
Dept: CT IMAGING | Facility: HOSPITAL | Age: 44
DRG: 175 | End: 2024-07-19
Payer: COMMERCIAL

## 2024-07-19 LAB
ANION GAP SERPL CALCULATED.3IONS-SCNC: 8 MMOL/L (ref 4–13)
BUN SERPL-MCNC: 41 MG/DL (ref 5–25)
CALCIUM SERPL-MCNC: 9.3 MG/DL (ref 8.4–10.2)
CHLORIDE SERPL-SCNC: 96 MMOL/L (ref 96–108)
CO2 SERPL-SCNC: 27 MMOL/L (ref 21–32)
CREAT SERPL-MCNC: 9.4 MG/DL (ref 0.6–1.3)
ERYTHROCYTE [DISTWIDTH] IN BLOOD BY AUTOMATED COUNT: 13.8 % (ref 11.6–15.1)
ERYTHROCYTE [DISTWIDTH] IN BLOOD BY AUTOMATED COUNT: 13.8 % (ref 11.6–15.1)
GFR SERPL CREATININE-BSD FRML MDRD: 6 ML/MIN/1.73SQ M
GLUCOSE SERPL-MCNC: 84 MG/DL (ref 65–140)
GLUCOSE SERPL-MCNC: 87 MG/DL (ref 65–140)
GLUCOSE SERPL-MCNC: 88 MG/DL (ref 65–140)
GLUCOSE SERPL-MCNC: 88 MG/DL (ref 65–140)
GLUCOSE SERPL-MCNC: 98 MG/DL (ref 65–140)
HCT VFR BLD AUTO: 28.6 % (ref 36.5–49.3)
HCT VFR BLD AUTO: 29.7 % (ref 36.5–49.3)
HGB BLD-MCNC: 8.9 G/DL (ref 12–17)
HGB BLD-MCNC: 9.3 G/DL (ref 12–17)
MAGNESIUM SERPL-MCNC: 2 MG/DL (ref 1.9–2.7)
MCH RBC QN AUTO: 29 PG (ref 26.8–34.3)
MCH RBC QN AUTO: 29.2 PG (ref 26.8–34.3)
MCHC RBC AUTO-ENTMCNC: 31.1 G/DL (ref 31.4–37.4)
MCHC RBC AUTO-ENTMCNC: 31.3 G/DL (ref 31.4–37.4)
MCV RBC AUTO: 93 FL (ref 82–98)
MCV RBC AUTO: 93 FL (ref 82–98)
PHOSPHATE SERPL-MCNC: 5.1 MG/DL (ref 2.7–4.5)
PLATELET # BLD AUTO: 174 THOUSANDS/UL (ref 149–390)
PLATELET # BLD AUTO: 189 THOUSANDS/UL (ref 149–390)
PMV BLD AUTO: 9 FL (ref 8.9–12.7)
PMV BLD AUTO: 9.3 FL (ref 8.9–12.7)
POTASSIUM SERPL-SCNC: 5 MMOL/L (ref 3.5–5.3)
RBC # BLD AUTO: 3.07 MILLION/UL (ref 3.88–5.62)
RBC # BLD AUTO: 3.18 MILLION/UL (ref 3.88–5.62)
SODIUM SERPL-SCNC: 131 MMOL/L (ref 135–147)
WBC # BLD AUTO: 6.12 THOUSAND/UL (ref 4.31–10.16)
WBC # BLD AUTO: 6.71 THOUSAND/UL (ref 4.31–10.16)

## 2024-07-19 PROCEDURE — 80048 BASIC METABOLIC PNL TOTAL CA: CPT | Performed by: INTERNAL MEDICINE

## 2024-07-19 PROCEDURE — 88313 SPECIAL STAINS GROUP 2: CPT | Performed by: INTERNAL MEDICINE

## 2024-07-19 PROCEDURE — 88300 SURGICAL PATH GROSS: CPT | Performed by: PATHOLOGY

## 2024-07-19 PROCEDURE — 84100 ASSAY OF PHOSPHORUS: CPT | Performed by: INTERNAL MEDICINE

## 2024-07-19 PROCEDURE — 88350 IMFLUOR EA ADDL 1ANTB STN PX: CPT | Performed by: INTERNAL MEDICINE

## 2024-07-19 PROCEDURE — 99232 SBSQ HOSP IP/OBS MODERATE 35: CPT | Performed by: INTERNAL MEDICINE

## 2024-07-19 PROCEDURE — 88346 IMFLUOR 1ST 1ANTB STAIN PX: CPT | Performed by: INTERNAL MEDICINE

## 2024-07-19 PROCEDURE — 83735 ASSAY OF MAGNESIUM: CPT | Performed by: INTERNAL MEDICINE

## 2024-07-19 PROCEDURE — 88305 TISSUE EXAM BY PATHOLOGIST: CPT | Performed by: INTERNAL MEDICINE

## 2024-07-19 PROCEDURE — 85027 COMPLETE CBC AUTOMATED: CPT | Performed by: INTERNAL MEDICINE

## 2024-07-19 PROCEDURE — 88348 ELECTRON MICROSCOPY DX: CPT | Performed by: INTERNAL MEDICINE

## 2024-07-19 PROCEDURE — 82948 REAGENT STRIP/BLOOD GLUCOSE: CPT

## 2024-07-19 PROCEDURE — 88329 PATH CONSLTJ DRG SURG: CPT | Performed by: PATHOLOGY

## 2024-07-19 PROCEDURE — 0TB13ZX EXCISION OF LEFT KIDNEY, PERCUTANEOUS APPROACH, DIAGNOSTIC: ICD-10-PCS | Performed by: INTERNAL MEDICINE

## 2024-07-19 RX ORDER — OXYCODONE HYDROCHLORIDE 5 MG/1
5 TABLET ORAL EVERY 4 HOURS PRN
Status: DISCONTINUED | OUTPATIENT
Start: 2024-07-19 | End: 2024-07-20

## 2024-07-19 RX ORDER — PROPOFOL 10 MG/ML
INJECTION, EMULSION INTRAVENOUS AS NEEDED
Status: DISCONTINUED | OUTPATIENT
Start: 2024-07-19 | End: 2024-07-19

## 2024-07-19 RX ORDER — HEPARIN SODIUM 1000 [USP'U]/ML
10000 INJECTION, SOLUTION INTRAVENOUS; SUBCUTANEOUS ONCE
Status: COMPLETED | OUTPATIENT
Start: 2024-07-19 | End: 2024-07-20

## 2024-07-19 RX ORDER — OXYCODONE HYDROCHLORIDE 10 MG/1
10 TABLET ORAL EVERY 4 HOURS PRN
Status: DISCONTINUED | OUTPATIENT
Start: 2024-07-19 | End: 2024-07-20

## 2024-07-19 RX ORDER — SUCCINYLCHOLINE/SOD CL,ISO/PF 100 MG/5ML
SYRINGE (ML) INTRAVENOUS AS NEEDED
Status: DISCONTINUED | OUTPATIENT
Start: 2024-07-19 | End: 2024-07-19

## 2024-07-19 RX ORDER — DIPHENHYDRAMINE HYDROCHLORIDE 50 MG/ML
12.5 INJECTION INTRAMUSCULAR; INTRAVENOUS ONCE AS NEEDED
Status: DISCONTINUED | OUTPATIENT
Start: 2024-07-19 | End: 2024-07-19 | Stop reason: HOSPADM

## 2024-07-19 RX ORDER — HEPARIN SODIUM 1000 [USP'U]/ML
10000 INJECTION, SOLUTION INTRAVENOUS; SUBCUTANEOUS EVERY 6 HOURS PRN
Status: DISCONTINUED | OUTPATIENT
Start: 2024-07-19 | End: 2024-07-20

## 2024-07-19 RX ORDER — LIDOCAINE WITH 8.4% SOD BICARB 0.9%(10ML)
SYRINGE (ML) INJECTION AS NEEDED
Status: COMPLETED | OUTPATIENT
Start: 2024-07-19 | End: 2024-07-19

## 2024-07-19 RX ORDER — ONDANSETRON 2 MG/ML
4 INJECTION INTRAMUSCULAR; INTRAVENOUS EVERY 4 HOURS PRN
Status: DISCONTINUED | OUTPATIENT
Start: 2024-07-19 | End: 2024-07-19 | Stop reason: HOSPADM

## 2024-07-19 RX ORDER — ONDANSETRON 2 MG/ML
INJECTION INTRAMUSCULAR; INTRAVENOUS AS NEEDED
Status: DISCONTINUED | OUTPATIENT
Start: 2024-07-19 | End: 2024-07-19

## 2024-07-19 RX ORDER — PROPOFOL 10 MG/ML
INJECTION, EMULSION INTRAVENOUS CONTINUOUS PRN
Status: DISCONTINUED | OUTPATIENT
Start: 2024-07-19 | End: 2024-07-19

## 2024-07-19 RX ORDER — ROCURONIUM BROMIDE 10 MG/ML
INJECTION, SOLUTION INTRAVENOUS AS NEEDED
Status: DISCONTINUED | OUTPATIENT
Start: 2024-07-19 | End: 2024-07-19

## 2024-07-19 RX ORDER — SODIUM CHLORIDE 9 MG/ML
INJECTION, SOLUTION INTRAVENOUS CONTINUOUS PRN
Status: DISCONTINUED | OUTPATIENT
Start: 2024-07-19 | End: 2024-07-19

## 2024-07-19 RX ORDER — HYDROMORPHONE HCL/PF 1 MG/ML
0.5 SYRINGE (ML) INJECTION ONCE
Status: COMPLETED | OUTPATIENT
Start: 2024-07-19 | End: 2024-07-19

## 2024-07-19 RX ORDER — HYDRALAZINE HYDROCHLORIDE 25 MG/1
25 TABLET, FILM COATED ORAL EVERY 8 HOURS SCHEDULED
Status: DISCONTINUED | OUTPATIENT
Start: 2024-07-19 | End: 2024-07-22

## 2024-07-19 RX ORDER — HYDROMORPHONE HCL/PF 1 MG/ML
1 SYRINGE (ML) INJECTION ONCE
Status: COMPLETED | OUTPATIENT
Start: 2024-07-19 | End: 2024-07-19

## 2024-07-19 RX ORDER — HEPARIN SODIUM 10000 [USP'U]/100ML
3-30 INJECTION, SOLUTION INTRAVENOUS
Status: DISCONTINUED | OUTPATIENT
Start: 2024-07-19 | End: 2024-07-20

## 2024-07-19 RX ORDER — HEPARIN SODIUM 1000 [USP'U]/ML
5000 INJECTION, SOLUTION INTRAVENOUS; SUBCUTANEOUS EVERY 6 HOURS PRN
Status: DISCONTINUED | OUTPATIENT
Start: 2024-07-19 | End: 2024-07-20

## 2024-07-19 RX ORDER — LIDOCAINE HYDROCHLORIDE 10 MG/ML
INJECTION, SOLUTION EPIDURAL; INFILTRATION; INTRACAUDAL; PERINEURAL AS NEEDED
Status: DISCONTINUED | OUTPATIENT
Start: 2024-07-19 | End: 2024-07-19

## 2024-07-19 RX ORDER — HEPARIN SODIUM 10000 [USP'U]/100ML
3-30 INJECTION, SOLUTION INTRAVENOUS
Status: DISCONTINUED | OUTPATIENT
Start: 2024-07-20 | End: 2024-07-19

## 2024-07-19 RX ADMIN — OXYCODONE HYDROCHLORIDE 10 MG: 10 TABLET ORAL at 16:17

## 2024-07-19 RX ADMIN — OXYCODONE HYDROCHLORIDE 10 MG: 10 TABLET ORAL at 02:01

## 2024-07-19 RX ADMIN — PROPOFOL 300 MG: 10 INJECTION, EMULSION INTRAVENOUS at 14:17

## 2024-07-19 RX ADMIN — SEVELAMER HYDROCHLORIDE 800 MG: 800 TABLET ORAL at 17:16

## 2024-07-19 RX ADMIN — SEVELAMER HYDROCHLORIDE 800 MG: 800 TABLET ORAL at 11:48

## 2024-07-19 RX ADMIN — LIDOCAINE HYDROCHLORIDE 50 MG: 10 INJECTION, SOLUTION EPIDURAL; INFILTRATION; INTRACAUDAL at 14:17

## 2024-07-19 RX ADMIN — OXYCODONE HYDROCHLORIDE 5 MG: 5 TABLET ORAL at 00:16

## 2024-07-19 RX ADMIN — HYDRALAZINE HYDROCHLORIDE 25 MG: 25 TABLET ORAL at 11:48

## 2024-07-19 RX ADMIN — OXYCODONE HYDROCHLORIDE 5 MG: 5 TABLET ORAL at 05:02

## 2024-07-19 RX ADMIN — HEPARIN SODIUM 29 UNITS/KG/HR: 10000 INJECTION, SOLUTION INTRAVENOUS at 00:15

## 2024-07-19 RX ADMIN — OXYCODONE HYDROCHLORIDE 5 MG: 5 TABLET ORAL at 17:20

## 2024-07-19 RX ADMIN — OXYCODONE HYDROCHLORIDE 10 MG: 10 TABLET ORAL at 06:18

## 2024-07-19 RX ADMIN — HEPARIN SODIUM 29 UNITS/KG/HR: 10000 INJECTION, SOLUTION INTRAVENOUS at 06:22

## 2024-07-19 RX ADMIN — SUGAMMADEX 200 MG: 100 INJECTION, SOLUTION INTRAVENOUS at 15:09

## 2024-07-19 RX ADMIN — SEVELAMER HYDROCHLORIDE 800 MG: 800 TABLET ORAL at 09:06

## 2024-07-19 RX ADMIN — ATORVASTATIN CALCIUM 80 MG: 40 TABLET, FILM COATED ORAL at 09:07

## 2024-07-19 RX ADMIN — OXYCODONE HYDROCHLORIDE 5 MG: 5 TABLET ORAL at 22:34

## 2024-07-19 RX ADMIN — OXYCODONE HYDROCHLORIDE 10 MG: 10 TABLET ORAL at 10:27

## 2024-07-19 RX ADMIN — HYDRALAZINE HYDROCHLORIDE 25 MG: 25 TABLET ORAL at 21:09

## 2024-07-19 RX ADMIN — ISOSORBIDE MONONITRATE 60 MG: 60 TABLET, EXTENDED RELEASE ORAL at 09:07

## 2024-07-19 RX ADMIN — NYSTATIN: 100000 POWDER TOPICAL at 17:17

## 2024-07-19 RX ADMIN — ONDANSETRON 4 MG: 2 INJECTION INTRAMUSCULAR; INTRAVENOUS at 15:03

## 2024-07-19 RX ADMIN — IRON SUCROSE 100 MG: 20 INJECTION, SOLUTION INTRAVENOUS at 21:09

## 2024-07-19 RX ADMIN — OXYCODONE HYDROCHLORIDE 5 MG: 5 TABLET ORAL at 09:06

## 2024-07-19 RX ADMIN — NYSTATIN: 100000 POWDER TOPICAL at 09:08

## 2024-07-19 RX ADMIN — PROPOFOL 70 MCG/KG/MIN: 10 INJECTION, EMULSION INTRAVENOUS at 14:19

## 2024-07-19 RX ADMIN — HYDRALAZINE HYDROCHLORIDE 25 MG: 25 TABLET ORAL at 05:02

## 2024-07-19 RX ADMIN — TORSEMIDE 100 MG: 100 TABLET ORAL at 09:07

## 2024-07-19 RX ADMIN — AMLODIPINE BESYLATE 10 MG: 10 TABLET ORAL at 09:07

## 2024-07-19 RX ADMIN — CARVEDILOL 6.25 MG: 6.25 TABLET, FILM COATED ORAL at 09:07

## 2024-07-19 RX ADMIN — Medication 100 MG: at 14:17

## 2024-07-19 RX ADMIN — ROCURONIUM BROMIDE 20 MG: 10 INJECTION INTRAVENOUS at 14:31

## 2024-07-19 RX ADMIN — SODIUM CHLORIDE: 0.9 INJECTION, SOLUTION INTRAVENOUS at 14:01

## 2024-07-19 RX ADMIN — HYDROMORPHONE HYDROCHLORIDE 0.5 MG: 1 INJECTION, SOLUTION INTRAMUSCULAR; INTRAVENOUS; SUBCUTANEOUS at 19:24

## 2024-07-19 RX ADMIN — Medication 10 ML: at 14:40

## 2024-07-19 RX ADMIN — HYDROMORPHONE HYDROCHLORIDE 1 MG: 1 INJECTION, SOLUTION INTRAMUSCULAR; INTRAVENOUS; SUBCUTANEOUS at 23:21

## 2024-07-19 RX ADMIN — OXYCODONE HYDROCHLORIDE 10 MG: 10 TABLET ORAL at 21:09

## 2024-07-19 RX ADMIN — ROCURONIUM BROMIDE 20 MG: 10 INJECTION INTRAVENOUS at 14:24

## 2024-07-19 NOTE — PROGRESS NOTES
NEPHROLOGY HOSPITAL PROGRESS NOTE   Joaquin Frankel 44 y.o. male MRN: [id number]  Unit/Bed#: S -01 Encounter: [id number]  Reason for Consult: CLOVIS requiring dialysis currently    ASSESSMENT and PLAN:    44-year-old male with a past medical history of CKD stage III with acute kidney injury on prior admission in June started on hemodialysis June 7, 2024, hypertension, monoclonal gammopathy, who is now presenting to the hospital early July with chest pain and concern for PE on admission.  Nephrology is on board for CLOVIS requiring dialysis     1-acute kidney injury currently on dialysis-present on admission.  Underlying CKD stage III.  Baseline creatinine 1.2 to 1/dL     - Had CLOVIS started on dialysis June 7, 2024  - Outpatient [facility]  - Access-right IJ PermCath  - Etiology-unclear.  MGR S versus other  - Does not have signs of renal recovery as of yet  - Noted attempted biopsy last week but unsuccessful due to respiratory status.  Therefore patient is being planned for renal biopsy July 15 with anesthesia present  Which was not able to be completed due to her respiratory status with hypoxia  - Prior bone marrow biopsy June 12 did not reveal a plasma cell neoplasm  - July 15 15th-dialysis completed with 4 L ultrafiltration.  Biopsy cannot be completed for renal biopsy due to hypoxia  - July 16-no urgent indication for dialysis we will plan for dialysis on July 17.  Started on hydralazine for hypertension.  - July 17-dialysis planned for today.  Dialysis completed with 3.5 L ultrafiltration.  Treatment was shortened to 3 hours due to staffing issues  - 7/19 - HD planned. Renal biopsy planned     Plan  - Noted hypoxia may be related to ANGY/hypoventilation syndrome.  - Heparin drip management per primary team  - Coordination of heparin drip with IR team will be by primary team for biopsy  - Lab work again in a.m.  - check BP now as AM BP above goal  - Post renal biopsy will need CBC-ordered for  "evening  - Discussed case with primary team resident in person.  We are in agreement for dialysis later today. Biopsy today. Monitor CBC closely post biopsy.   - reviewed case with pt in detail and risks of bleeding with pt and pt understands and is accepting of risks  - update - BP check now is 150s syst. Will plan for afternoon hydral now and BP check one hour. I have messaged primary team also.      Portions of the record may have been created with voice recognition software. Occasional wrong word or \"sound a like\" substitutions may have occurred due to the inherent limitations of voice recognition software. Read the chart carefully and recognize, using context, where substitutions have occurred.If you have any questions, please contact the dictating provider.        2-tunneled dialysis catheter site-very minimal erythema noted at insertion site.  Will plan to monitor for now.  This site appears improved from overall     3-electrolytes-Na low but stable 131. No changes. Likely due to vol     4-acid/base-monitor with dialysis.  Appropriate for now     5-chest pain-resolved.  With concern for underlying coronary artery disease being medically managed for now with cardiology team     6-volume overload-ultrafiltrate with dialysis as tolerates.     - On torsemide 100 mg daily  - improved vol state overall. Now minimal edema RLE. Chronic LLE edema. Lungs CTA     7-anemia-no JOSSELYN due to acute PE.  Hemoglobin at goal for biopsy above 8.     On IV iron     8-nephrotic range proteinuria-plan for biopsy      - Serological workup negative except for IgG kappa on SPEP and UPEP  - Renal biopsy planning in progress     9-hypertension-on amlodipine, carvedilol, torsemide.  Not able to increase carvedilol due to bradycardia     - Add hydralazine to her regimen July 16, titrate higher 7/18  - Blood pressure is appropriately improving       SUBJECTIVE / 24H INTERVAL HISTORY:  Pt denies complaints. No SOB.     OBJECTIVE:  Current " Weight: Weight - Scale: (!) 172 kg (378 lb 15.5 oz)  Vitals:    07/19/24 0500 07/19/24 0502 07/19/24 0705 07/19/24 0903   BP:  165/83 157/80    BP Location:       Pulse:   (!) 50 63   Resp:   17    Temp:   98.3 °F (36.8 °C)    TempSrc:       SpO2:   100%    Weight: (!) 172 kg (378 lb 15.5 oz)      Height:           Intake/Output Summary (Last 24 hours) at 7/19/2024 1127  Last data filed at 7/19/2024 0615  Gross per 24 hour   Intake 240 ml   Output 825 ml   Net -585 ml     General: NAD  Skin: no rash  Eyes: anicteric sclera  ENT: moist mucous membrane  Neck: supple  Chest: CTA b/l, no ronchii, no wheeze, no rubs, no rales  CVS: s1s2, no murmur, no gallop, no rub  Abdomen: soft, nontender, nl sounds  Extremities: no edema RLE. LLE unchanged edema, TDC no drainage. Erythema unchanged and very minimal.   : no lewis  Neuro: AAOX3  Psych: normal affect    Medications:    Current Facility-Administered Medications:     acetaminophen (TYLENOL) tablet 650 mg, 650 mg, Oral, Q6H PRN, Mily Martin MD, 650 mg at 07/11/24 1950    amLODIPine (NORVASC) tablet 10 mg, 10 mg, Oral, Daily, Andrea Longoria MD, 10 mg at 07/19/24 0907    atorvastatin (LIPITOR) tablet 80 mg, 80 mg, Oral, Daily, Mily Martin MD, 80 mg at 07/19/24 0907    carvedilol (COREG) tablet 6.25 mg, 6.25 mg, Oral, BID With Meals, Andrea Longoria MD, 6.25 mg at 07/19/24 0907    heparin (porcine) injection 10,000 Units, 10,000 Units, Intravenous, Q6H PRN, Lorri Maria MD    heparin (porcine) injection 5,000 Units, 5,000 Units, Intravenous, Q6H PRN, Lorri Maria MD, 5,000 Units at 07/18/24 0645    hydrALAZINE (APRESOLINE) tablet 25 mg, 25 mg, Oral, Q8H SHAZIA, Kriss Velazco MD, 25 mg at 07/19/24 0502    iron sucrose (VENOFER) 100 mg in sodium chloride 0.9 % 100 mL IVPB, 100 mg, Intravenous, After Dialysis, Johnny Viramontes MD, Last Rate: 0 mL/hr at 07/15/24 1311, 100 mg at 07/17/24 1358    isosorbide mononitrate (IMDUR) 24 hr tablet 60 mg, 60 mg, Oral, Daily, Bilal  MD Mario, 60 mg at 07/19/24 0907    labetalol (NORMODYNE) injection 10 mg, 10 mg, Intravenous, Q6H PRN, Liana Riley MD    lidocaine (LIDODERM) 5 % patch 1 patch, 1 patch, Topical, Daily, Mily Martin MD    nystatin (MYCOSTATIN) powder, , Topical, BID, Lorri Maria MD, Given at 07/19/24 0908    ondansetron (ZOFRAN) injection 4 mg, 4 mg, Intravenous, Once, Cindy Balderas MD    oxyCODONE (ROXICODONE) immediate release tablet 10 mg, 10 mg, Oral, Q4H PRN, Lorri Maria MD, 10 mg at 07/19/24 1027    oxyCODONE (ROXICODONE) IR tablet 5 mg, 5 mg, Oral, Q4H PRN, Lorri Maria MD, 5 mg at 07/19/24 0906    polyethylene glycol (MIRALAX) packet 17 g, 17 g, Oral, Daily PRN, Diane Oliveira MD    sevelamer (RENAGEL) tablet 800 mg, 800 mg, Oral, TID With Meals, Mily Martin MD, 800 mg at 07/19/24 0906    torsemide (DEMADEX) tablet 100 mg, 100 mg, Oral, Daily, Charles Spear MD, 100 mg at 07/19/24 0907    Laboratory Results:  Results from last 7 days   Lab Units 07/19/24  0613 07/18/24  0535 07/17/24  0735 07/16/24  0549 07/15/24  0459 07/13/24  0745   WBC Thousand/uL 6.12 5.56 5.69 5.84 6.60 5.99   HEMOGLOBIN g/dL 9.3* 8.9* 9.1* 8.7* 8.3* 9.4*   HEMATOCRIT % 29.7* 28.6* 29.0* 27.3* 26.6* 30.0*   PLATELETS Thousands/uL 189 182 201 175 236 261   POTASSIUM mmol/L 5.0 4.2 4.4 4.3 4.6 4.4   CHLORIDE mmol/L 96 95* 93* 95* 96 95*   CO2 mmol/L 27 29 30 30 27 27   BUN mg/dL 41* 31* 40* 32* 46* 27*   CREATININE mg/dL 9.40* 7.66* 9.55* 8.00* 10.06* 7.33*   CALCIUM mg/dL 9.3 8.7 9.1 8.8 9.1 9.3   MAGNESIUM mg/dL 2.0 1.9 2.1 1.8*  --   --    PHOSPHORUS mg/dL 5.1* 4.5 5.4* 4.3  --   --

## 2024-07-19 NOTE — PLAN OF CARE
Problem: Potential for Falls  Goal: Patient will remain free of falls  Description: INTERVENTIONS:  - Educate patient/family on patient safety including physical limitations  - Instruct patient to call for assistance with activity   - Consult OT/PT to assist with strengthening/mobility   - Keep Call bell within reach  - Keep bed low and locked with side rails adjusted as appropriate  - Keep care items and personal belongings within reach  - Initiate and maintain comfort rounds  - Make Fall Risk Sign visible to staff  - Apply yellow socks and bracelet for high fall risk patients  - Consider moving patient to room near nurses station  Outcome: Progressing     Problem: METABOLIC, FLUID AND ELECTROLYTES - ADULT  Goal: Electrolytes maintained within normal limits  Description: INTERVENTIONS:  - Monitor labs and assess patient for signs and symptoms of electrolyte imbalances  - Administer electrolyte replacement as ordered  - Monitor response to electrolyte replacements, including repeat lab results as appropriate  - Instruct patient on fluid and nutrition as appropriate  Outcome: Progressing  Goal: Fluid balance maintained  Description: INTERVENTIONS:  - Monitor labs   - Monitor I/O and WT  - Instruct patient on fluid and nutrition as appropriate  - Assess for signs & symptoms of volume excess or deficit  Outcome: Progressing     Problem: Prexisting or High Potential for Compromised Skin Integrity  Goal: Skin integrity is maintained or improved  Description: INTERVENTIONS:  - Identify patients at risk for skin breakdown  - Assess and monitor skin integrity  - Assess and monitor nutrition and hydration status  - Monitor labs   - Assess for incontinence   - Turn and reposition patient  - Assist with mobility/ambulation  - Relieve pressure over bony prominences  - Avoid friction and shearing  - Provide appropriate hygiene as needed including keeping skin clean and dry  - Evaluate need for skin moisturizer/barrier cream  -  Collaborate with interdisciplinary team   - Patient/family teaching  - Consider wound care consult   Outcome: Progressing     Problem: Nutrition/Hydration-ADULT  Goal: Nutrient/Hydration intake appropriate for improving, restoring or maintaining nutritional needs  Description: Monitor and assess patient's nutrition/hydration status for malnutrition. Collaborate with interdisciplinary team and initiate plan and interventions as ordered.  Monitor patient's weight and dietary intake as ordered or per policy. Utilize nutrition screening tool and intervene as necessary. Determine patient's food preferences and provide high-protein, high-caloric foods as appropriate.     INTERVENTIONS:  - Monitor oral intake, urinary output, labs, and treatment plans  - Assess nutrition and hydration status and recommend course of action  - Evaluate amount of meals eaten  - Assist patient with eating if necessary   - Allow adequate time for meals  - Recommend/ encourage appropriate diets, oral nutritional supplements, and vitamin/mineral supplements  - Order, calculate, and assess calorie counts as needed  - Recommend, monitor, and adjust tube feedings and TPN/PPN based on assessed needs  - Assess need for intravenous fluids  - Provide specific nutrition/hydration education as appropriate  - Include patient/family/caregiver in decisions related to nutrition  Outcome: Progressing     Problem: PAIN - ADULT  Goal: Verbalizes/displays adequate comfort level or baseline comfort level  Description: Interventions:  - Encourage patient to monitor pain and request assistance  - Assess pain using appropriate pain scale  - Administer analgesics based on type and severity of pain and evaluate response  - Implement non-pharmacological measures as appropriate and evaluate response  - Consider cultural and social influences on pain and pain management  - Notify physician/advanced practitioner if interventions unsuccessful or patient reports new  pain  Outcome: Progressing     Problem: SAFETY ADULT  Goal: Patient will remain free of falls  Description: INTERVENTIONS:  - Educate patient/family on patient safety including physical limitations  - Instruct patient to call for assistance with activity   - Consult OT/PT to assist with strengthening/mobility   - Keep Call bell within reach  - Keep bed low and locked with side rails adjusted as appropriate  - Keep care items and personal belongings within reach  - Initiate and maintain comfort rounds  - Make Fall Risk Sign visible to staff  - Apply yellow socks and bracelet for high fall risk patients  - Consider moving patient to room near nurses station  Outcome: Progressing  Goal: Maintain or return to baseline ADL function  Description: INTERVENTIONS:  - Educate patient/family on patient safety including physical limitations  - Instruct patient to call for assistance with activity   - Consult OT/PT to assist with strengthening/mobility   - Keep Call bell within reach  - Keep bed low and locked with side rails adjusted as appropriate  - Keep care items and personal belongings within reach  - Initiate and maintain comfort round    - Apply yellow socks and bracelet for high fall risk patients  - Consider moving patient to room near nurses station  Outcome: Progressing  Goal: Maintains/Returns to pre admission functional level  Description: INTERVENTIONS:  -  Assess patient's ability to carry out ADLs; assess patient's baseline for ADL function and identify physical deficits which impact ability to perform ADLs (bathing, care of mouth/teeth, toileting, grooming, dressing, etc.)  - Assess/evaluate cause of self-care deficits   - Assess range of motion  - Assess patient's mobility; develop plan if impaired  - Assess patient's need for assistive devices and provide as appropriate  - Encourage maximum independence but intervene and supervise when necessary  - Involve family in performance of ADLs  - Assess for home care  needs following discharge   - Consider OT consult to assist with ADL evaluation and planning for discharge  - Provide patient education as appropriate  Outcome: Progressing     Problem: INFECTION - ADULT  Goal: Absence or prevention of progression during hospitalization  Description: INTERVENTIONS:  - Assess and monitor for signs and symptoms of infection  - Monitor lab/diagnostic results  - Monitor all insertion sites, i.e. indwelling lines, tubes, and drains  - Monitor endotracheal if appropriate and nasal secretions for changes in amount and color  - [place] appropriate cooling/warming therapies per order  - Administer medications as ordered  - Instruct and encourage patient and family to use good hand hygiene technique  - Identify and instruct in appropriate isolation precautions for identified infection/condition  Outcome: Progressing  Goal: Absence of fever/infection during neutropenic period  Description: INTERVENTIONS:  - Monitor WBC    Outcome: Progressing     Problem: DISCHARGE PLANNING  Goal: Discharge to home or other facility with appropriate resources  Description: INTERVENTIONS:  - Identify barriers to discharge w/patient and caregiver  - Arrange for needed discharge resources and transportation as appropriate  - Identify discharge learning needs (meds, wound care, etc.)  - Arrange for interpretive services to assist at discharge as needed  - Refer to Case Management Department for coordinating discharge planning if the patient needs post-hospital services based on physician/advanced practitioner order or complex needs related to functional status, cognitive ability, or social support system  Outcome: Progressing

## 2024-07-19 NOTE — PLAN OF CARE
Problem: Potential for Falls  Goal: Patient will remain free of falls  Description: INTERVENTIONS:  - Educate patient/family on patient safety including physical limitations  - Instruct patient to call for assistance with activity   - Consult OT/PT to assist with strengthening/mobility   - Keep Call bell within reach  - Keep bed low and locked with side rails adjusted as appropriate  - Keep care items and personal belongings within reach  - Initiate and maintain comfort rounds  - Make Fall Risk Sign visible to staff  - Offer Toileting every 2 Hours, in advance of need  - Apply yellow socks and bracelet for high fall risk patients  - Consider moving patient to room near nurses station  Outcome: Progressing     Problem: METABOLIC, FLUID AND ELECTROLYTES - ADULT  Goal: Electrolytes maintained within normal limits  Description: INTERVENTIONS:  - Monitor labs and assess patient for signs and symptoms of electrolyte imbalances  - Administer electrolyte replacement as ordered  - Monitor response to electrolyte replacements, including repeat lab results as appropriate  - Instruct patient on fluid and nutrition as appropriate  Outcome: Progressing  Goal: Fluid balance maintained  Description: INTERVENTIONS:  - Monitor labs   - Monitor I/O and WT  - Instruct patient on fluid and nutrition as appropriate  - Assess for signs & symptoms of volume excess or deficit  Outcome: Progressing     Problem: Prexisting or High Potential for Compromised Skin Integrity  Goal: Skin integrity is maintained or improved  Description: INTERVENTIONS:  - Identify patients at risk for skin breakdown  - Assess and monitor skin integrity  - Assess and monitor nutrition and hydration status  - Monitor labs   - Assess for incontinence   - Turn and reposition patient  - Assist with mobility/ambulation  - Relieve pressure over bony prominences  - Avoid friction and shearing  - Provide appropriate hygiene as needed including keeping skin clean and  dry  - Evaluate need for skin moisturizer/barrier cream  - Collaborate with interdisciplinary team   - Patient/family teaching  - Consider wound care consult   Outcome: Progressing     Problem: Nutrition/Hydration-ADULT  Goal: Nutrient/Hydration intake appropriate for improving, restoring or maintaining nutritional needs  Description: Monitor and assess patient's nutrition/hydration status for malnutrition. Collaborate with interdisciplinary team and initiate plan and interventions as ordered.  Monitor patient's weight and dietary intake as ordered or per policy. Utilize nutrition screening tool and intervene as necessary. Determine patient's food preferences and provide high-protein, high-caloric foods as appropriate.     INTERVENTIONS:  - Monitor oral intake, urinary output, labs, and treatment plans  - Assess nutrition and hydration status and recommend course of action  - Evaluate amount of meals eaten  - Assist patient with eating if necessary   - Allow adequate time for meals  - Recommend/ encourage appropriate diets, oral nutritional supplements, and vitamin/mineral supplements  - Order, calculate, and assess calorie counts as needed  - Assess need for intravenous fluids  - Provide specific nutrition/hydration education as appropriate  - Include patient/family/caregiver in decisions related to nutrition  Outcome: Progressing     Problem: PAIN - ADULT  Goal: Verbalizes/displays adequate comfort level or baseline comfort level  Description: Interventions:  - Encourage patient to monitor pain and request assistance  - Assess pain using appropriate pain scale  - Administer analgesics based on type and severity of pain and evaluate response  - Implement non-pharmacological measures as appropriate and evaluate response  - Consider cultural and social influences on pain and pain management  - Notify physician/advanced practitioner if interventions unsuccessful or patient reports new pain  Outcome: Progressing      Problem: DISCHARGE PLANNING  Goal: Discharge to home or other facility with appropriate resources  Description: INTERVENTIONS:  - Identify barriers to discharge w/patient and caregiver  - Arrange for needed discharge resources and transportation as appropriate  - Identify discharge learning needs (meds, wound care, etc.)  - Arrange for interpretive services to assist at discharge as needed  - Refer to Case Management Department for coordinating discharge planning if the patient needs post-hospital services based on physician/advanced practitioner order or complex needs related to functional status, cognitive ability, or social support system  Outcome: Progressing

## 2024-07-19 NOTE — ANESTHESIA PREPROCEDURE EVALUATION
Procedure:  PRE-OP ONLY    Relevant Problems   CARDIO   (+) Chest pain   (+) Coronary artery disease with angina pectoris (HCC)   (+) Hypertension   (+) Other hyperlipidemia      ENDO   (+) Type 2 diabetes mellitus (HCC)      /RENAL   (+) Acute renal failure (ARF) ([facility])   (+) CKD (chronic kidney disease) stage 4, GFR 15-29 ml/min ([facility])   (+) ESRD (end stage renal disease) on dialysis (HCC)      HEMATOLOGY   (+) Anemia   (+) Anemia of chronic disease      PULMONARY   (+) ANGY (obstructive sleep apnea)      Hx MI    Difficult intubation history but easy with longoria      Left Ventricle: Left ventricular cavity size is normal. There is mild concentric hypertrophy. The left ventricular ejection fraction is %. Systolic function is normal. Wall motion is normal. Diastolic function is normal.    Right Ventricle: Right ventricular cavity size is normal. Systolic function is normal.    Tricuspid Valve: There is trace regurgitation.    Prior TTE study available for comparison. Prior study date: 6/4/2024. No significant changes noted compared to the prior study.         Stress ECG: The stress ECG is equivocal for ischemia after pharmacologic vasodilation.    Perfusion: There is a left ventricular perfusion defect that is small to medium in size present in the apical location(s) that is predominantly fixed.    Stress Combined Conclusion: Left ventricular perfusion is probably abnormal.    Stress Function: Left ventricular function post-stress is normal. Stress ejection fraction is 58%.    Perfusion Defect Conclusion: The stress/rest perfusion ratio is 1.10. There is no evidence of transient ischemic dilation (TID).  Physical Exam    Airway    Mallampati score: II  TM Distance: >3 FB  Neck ROM: full     Dental   No notable dental hx     Cardiovascular  Cardiovascular exam normal    Pulmonary  Pulmonary exam normal     Other Findings        Anesthesia Plan  ASA Score- 3     Anesthesia Type-     Plan Factors-    Induction-      Postoperative Plan-     Perioperative Resuscitation Plan - Level 1 - Full Code.       Informed Consent-

## 2024-07-19 NOTE — ANESTHESIA PREPROCEDURE EVALUATION
Procedure:  IR BIOPSY KIDNEY RANDOM    Relevant Problems   CARDIO   (+) Chest pain   (+) Coronary artery disease with angina pectoris (HCC)   (+) Hypertension   (+) Other hyperlipidemia      ENDO   (+) Type 2 diabetes mellitus (HCC)      /RENAL   (+) Acute renal failure (ARF) ([facility])   (+) CKD (chronic kidney disease) stage 4, GFR 15-29 ml/min ([facility])   (+) ESRD (end stage renal disease) on dialysis (HCC)      HEMATOLOGY   (+) Anemia   (+) Anemia of chronic disease      PULMONARY   (+) ANGY (obstructive sleep apnea)      Hx MI    Difficult intubation history but easy with longoria      Left Ventricle: Left ventricular cavity size is normal. There is mild concentric hypertrophy. The left ventricular ejection fraction is %. Systolic function is normal. Wall motion is normal. Diastolic function is normal.    Right Ventricle: Right ventricular cavity size is normal. Systolic function is normal.    Tricuspid Valve: There is trace regurgitation.    Prior TTE study available for comparison. Prior study date: 6/4/2024. No significant changes noted compared to the prior study.         Stress ECG: The stress ECG is equivocal for ischemia after pharmacologic vasodilation.    Perfusion: There is a left ventricular perfusion defect that is small to medium in size present in the apical location(s) that is predominantly fixed.    Stress Combined Conclusion: Left ventricular perfusion is probably abnormal.    Stress Function: Left ventricular function post-stress is normal. Stress ejection fraction is 58%.    Perfusion Defect Conclusion: The stress/rest perfusion ratio is 1.10. There is no evidence of transient ischemic dilation (TID).  Physical Exam    Airway    Mallampati score: II  TM Distance: >3 FB  Neck ROM: full     Dental   No notable dental hx     Cardiovascular  Cardiovascular exam normal    Pulmonary  Pulmonary exam normal     Other Findings        Anesthesia Plan  ASA Score- 3     Anesthesia Type- general with ASA Monitors.          Additional Monitors:     Airway Plan: ETT.           Plan Factors-Exercise tolerance (METS): >4 METS.    Chart reviewed. EKG reviewed. Imaging results reviewed. Existing labs reviewed. Patient summary reviewed.    Patient is a current smoker.  Patient instructed to abstain from smoking on day of procedure. Patient did not smoke on day of surgery.    Obstructive sleep apnea risk education given perioperatively.        Induction- intravenous.    Postoperative Plan-     Perioperative Resuscitation Plan - Level 1 - Full Code.       Informed Consent- Anesthetic plan and risks discussed with patient.  I personally reviewed this patient with the CRNA. Discussed and agreed on the Anesthesia Plan with the CRNA..

## 2024-07-19 NOTE — ANESTHESIA POSTPROCEDURE EVALUATION
Post-Op Assessment Note    CV Status:  Stable  Pain Score: 0    Pain management: adequate       Mental Status:  Alert and awake   Hydration Status:  Euvolemic   PONV Controlled:  Controlled   Airway Patency:  Patent     Post Op Vitals Reviewed: Yes    No anethesia notable event occurred.    Staff: CRNA               BP   121/61   Temp      Pulse  65   Resp   15   SpO2   94

## 2024-07-19 NOTE — PROGRESS NOTES
NEPHROLOGY HOSPITAL PROGRESS NOTE   Joaquin Frankel 44 y.o. male MRN: [id number]  Unit/Bed#: S -01 Encounter: [id number]  Reason for Consult: CLOVIS on CKD    ASSESSMENT and PLAN:  44-year-old male with history of CKD with acute kidney injury on prior admission in June and started on hemodialysis, hypertension, monoclonal gammopathy presented with chest pain and concern for PE on admission.  Nephrology consulted for CLOVIS requiring dialysis.    1.  CLOVIS on CKD.  Baseline creatinine 1.2-1.0.  He had CLOVIS and started on dialysis on June 7, 2024.  Outpatient dialysis unit [facility].  Access is right IJ permacath.  Etiology is unclear, MGR S versus other.  Does not have signs of renal recovery as of yet.  Status post kidney biopsy 07/19, results pending.  Electrolytes and volume status stable today.  Next hemodialysis session will be Monday.    2.  Hyponatremia.  Serum sodium 133.  Continue fluid restriction.    3.  Nephrotic range proteinuria.  Serological workup negative except for IgG kappa on SPEP and UPEP.  Follow-up kidney biopsy results.    4.  Hypertension.  Current Rx: Amlodipine 10 mg daily, Coreg 6.25 mg twice daily, hydralazine 25 mg 3 times daily, Imdur 60 mg daily and torsemide 100 mg daily.  Blood pressure is currently controlled.  No changes to medications.    5.  Anemia.  No JOSSELYN due to PE.  Hemoglobin currently 9.2 without any drop after kidney biopsy.    6.  Volume status.  Continue ultrafiltration on dialysis.  Continue torsemide 100 mg daily.    7.  Pain at kidney biopsy site.  Hold heparin.  Stat CT abdomen to rule out hematoma.    8.  PE.  Started on IV heparin this morning after a break for kidney biopsy.  Hold heparin while hematoma is ruled out.    Discussed with internal medicine team.  After discussion, we agreed that due to pain at biopsy site, we will hold heparin and get stat CT abdomen to rule out hematoma.    SUBJECTIVE / 24H INTERVAL HISTORY:  Pain at biopsy site overnight.   Urine output recorded as 400 cc.  2700 cc UF done on dialysis yesterday.  Denies dyspnea.  Denies leg swelling.    OBJECTIVE:  Current Weight: Weight - Scale: (!) 171 kg (376 lb 9.6 oz)  Vitals:    07/19/24 2303 07/20/24 0516 07/20/24 0529 07/20/24 0600   BP: 125/66 (!) 146/107     BP Location:       Pulse: (!) 54 60     Resp:       Temp: 98.7 °F (37.1 °C)      TempSrc:       SpO2: 96% 95%     Weight:   (!) 171 kg (376 lb 9.6 oz) (!) 171 kg (376 lb 9.6 oz)   Height:           Intake/Output Summary (Last 24 hours) at 7/20/2024 0749  Last data filed at 7/20/2024 0719  Gross per 24 hour   Intake 1280 ml   Output 3312 ml   Net -2032 ml     Review of Systems   Constitutional:  Negative for chills and fever.   HENT:  Negative for ear pain and sore throat.    Eyes:  Negative for pain and visual disturbance.   Respiratory:  Negative for cough and shortness of breath.    Cardiovascular:  Negative for chest pain and palpitations.   Gastrointestinal:  Negative for abdominal pain and vomiting.   Genitourinary:  Positive for flank pain. Negative for dysuria and hematuria.   Musculoskeletal:  Negative for arthralgias and back pain.   Skin:  Negative for color change and rash.   Neurological:  Negative for seizures and syncope.   All other systems reviewed and are negative.    Physical Exam  Vitals and nursing note reviewed.   Constitutional:       General: He is not in acute distress.     Appearance: He is well-developed.   HENT:      Head: Normocephalic and atraumatic.   Eyes:      Conjunctiva/sclera: Conjunctivae normal.   Cardiovascular:      Rate and Rhythm: Normal rate and regular rhythm.      Pulses: Normal pulses.      Heart sounds: Normal heart sounds. No murmur heard.     Comments: Right chest wall permacath present  Pulmonary:      Effort: Pulmonary effort is normal. No respiratory distress.      Breath sounds: Normal breath sounds.   Abdominal:      Palpations: Abdomen is soft.      Tenderness: There is no abdominal  tenderness. There is left CVA tenderness.   Musculoskeletal:         General: No swelling.      Cervical back: Neck supple.      Right lower leg: Edema present.      Left lower leg: Edema present.   Skin:     General: Skin is warm and dry.      Capillary Refill: Capillary refill takes less than 2 seconds.   Neurological:      Mental Status: He is alert.   Psychiatric:         Mood and Affect: Mood normal.         Medications:    Current Facility-Administered Medications:     acetaminophen (TYLENOL) tablet 975 mg, 975 mg, Oral, Q8H [facility], Marsha Martin DO, 975 mg at 07/20/24 0158    amLODIPine (NORVASC) tablet 10 mg, 10 mg, Oral, Daily, Andrea Longoria MD, 10 mg at 07/19/24 0907    atorvastatin (LIPITOR) tablet 80 mg, 80 mg, Oral, Daily, Mily Martin MD, 80 mg at 07/19/24 0907    carvedilol (COREG) tablet 6.25 mg, 6.25 mg, Oral, BID With Meals, Andrea Longoria MD, 6.25 mg at 07/19/24 0907    heparin (porcine) 25,000 units in 0.45% NaCl 250 mL infusion (premix), 3-30 Units/kg/hr (Order-Specific), Intravenous, Titrated, Sushant Maurice MD, Last Rate: 22.5 mL/hr at 07/20/24 0600, 18 Units/kg/hr at 07/20/24 0600    heparin (porcine) injection 10,000 Units, 10,000 Units, Intravenous, Q6H PRN, Sushant Maurice MD    heparin (porcine) injection 5,000 Units, 5,000 Units, Intravenous, Q6H PRN, Sushant Maurice MD    hydrALAZINE (APRESOLINE) tablet 25 mg, 25 mg, Oral, Q8H SHAZIA, Kriss Velazco MD, 25 mg at 07/20/24 0519    HYDROmorphone (DILAUDID) injection 0.5 mg, 0.5 mg, Intravenous, Q3H PRN, Cary Wilkinson MD, 0.5 mg at 07/20/24 0459    iron sucrose (VENOFER) 100 mg in sodium chloride 0.9 % 100 mL IVPB, 100 mg, Intravenous, After Dialysis, Johnny Viramontes MD, Last Rate: 0 mL/hr at 07/15/24 1311, 100 mg at 07/19/24 2109    isosorbide mononitrate (IMDUR) 24 hr tablet 60 mg, 60 mg, Oral, Daily, Mily Martin MD, 60 mg at 07/19/24 0907    lidocaine (LIDODERM) 5 % patch 3 patch, 3 patch, Topical,  "Daily, Cary Wilkinson MD    melatonin tablet 3 mg, 3 mg, Oral, HS, Cary Wilkinson MD    naloxone (NARCAN) 0.04 mg/mL syringe 0.04 mg, 0.04 mg, Intravenous, Q1MIN PRN, Cary Wilkinson MD    nystatin (MYCOSTATIN) powder, , Topical, BID, Lorri Maria MD, Given at 07/19/24 1717    ondansetron (ZOFRAN) injection 4 mg, 4 mg, Intravenous, Once, Cindy Balderas MD    oxyCODONE (ROXICODONE) immediate release tablet 10 mg, 10 mg, Oral, Q4H PRN, Cary Wilkinson MD, 10 mg at 07/20/24 0716    oxyCODONE (ROXICODONE) IR tablet 5 mg, 5 mg, Oral, Q4H PRN, Cary Wilkinson MD    polyethylene glycol (MIRALAX) packet 17 g, 17 g, Oral, Daily PRN, Diane Oliveira MD    sevelamer (RENAGEL) tablet 800 mg, 800 mg, Oral, TID With Meals, Mily Martin MD, 800 mg at 07/19/24 1716    torsemide (DEMADEX) tablet 100 mg, 100 mg, Oral, Daily, Charles Spear MD, 100 mg at 07/19/24 0907    Laboratory Results:  Results from last 7 days   Lab Units 07/20/24  0538 07/20/24  0536 07/19/24  2020 07/19/24  0613 07/18/24  0535 07/17/24  0735 07/16/24  0549 07/15/24  0459   WBC Thousand/uL  --  6.23 6.71 6.12 5.56 5.69 5.84 6.60   HEMOGLOBIN g/dL  --  9.2* 8.9* 9.3* 8.9* 9.1* 8.7* 8.3*   HEMATOCRIT %  --  29.7* 28.6* 29.7* 28.6* 29.0* 27.3* 26.6*   PLATELETS Thousands/uL  --  185 174 189 182 201 175 236   POTASSIUM mmol/L 4.9  --   --  5.0 4.2 4.4 4.3 4.6   CHLORIDE mmol/L 96  --   --  96 95* 93* 95* 96   CO2 mmol/L 30  --   --  27 29 30 30 27   BUN mg/dL 33*  --   --  41* 31* 40* 32* 46*   CREATININE mg/dL 8.65*  --   --  9.40* 7.66* 9.55* 8.00* 10.06*   CALCIUM mg/dL 9.2  --   --  9.3 8.7 9.1 8.8 9.1   MAGNESIUM mg/dL 2.0  --   --  2.0 1.9 2.1 1.8*  --    PHOSPHORUS mg/dL 5.2*  --   --  5.1* 4.5 5.4* 4.3  --        Portions of the record may have been created with voice recognition software. Occasional wrong word or \"sound a like\" substitutions may have occurred due to the inherent limitations of voice recognition software. Read the chart carefully and recognize, using context, " where substitutions have occurred. If you have any questions, please contact the dictating provider.

## 2024-07-19 NOTE — SEDATION DOCUMENTATION
Random kidney biopsy completed by Dr. Nation. D-stat and band-aid to left kidney access site. Bedrest 4 hours. Patient under care of anesthesia for the duration of the procedure. Please see anesthesia charting for vitals, monitoring, med administration, etc.

## 2024-07-19 NOTE — PROGRESS NOTES
[facility]  Progress Note  Name: Joaquin Frankel I  MRN: [id number]  Unit/Bed#: S -01 I Date of Admission: 7/4/2024   Date of Service: 7/19/2024 I Hospital Day: 15    Assessment & Plan   * ESRD (end stage renal disease) on dialysis (HCC)  Assessment & Plan  Lab Results   Component Value Date    EGFR 6 07/19/2024    EGFR 7 07/18/2024    EGFR 5 07/17/2024    CREATININE 9.40 (H) 07/19/2024    CREATININE 7.66 (H) 07/18/2024    CREATININE 9.55 (H) 07/17/2024     Patient ESRD on Dialysis 3 times a week, MWF  Elevated , c/o of orthopnea   Etiology of CLOVIS: Etiology not entirely clear - Possible MGRS? Pattern of disease progression is not typical for diabetic nephropathy.   Etiology of CKD: Suspected due to diabetic nephropathy, hypertensive nephrosclerosis, obesity related renal dysfunction  Previous baseline creatinine 1.3-1.6 w/ prior episodes of CLOVIS  Serologic workup negative except for IgG kappa on SPEP and UPEP.  Cryoglobulin negative, ANCA negative, anti-dsDNA negative, anti-GBM negative, JOSE ROBERTO negative, C4 34, C3 118  Bone marrow biopsy 6/12/2024: negative for plasma cell neoplasm.   CRRT initiated 6/7/2024. Peak creatinine 10.4  Last dialyzed 7/17/2024   Dry weight decreased to 172.5 kg   electrolytes and acid/base stable  7/12 renal biopsy was not performed due to hypertension despite sedation and antihypertensive medications. Rescheduled for Monday 7/15/24  7/15 renal biopsy was not completed due to oxygen saturation of 76-89% during dialysis.  Likely due to ANGY/OHS.  Agreed to wear oxygen via nasal cannula.  7/19 Biopsy will be attempted under local anesthesia and very light sedation with ultrasound-guidance in an attempt to limit need for possible intubation    Plan:  Nephrology onboard  Hold heparin this morning for upcoming renal biopsy.    Obtain CBC post biopsy to monitor Hb. Hb less than 8 notify on call nephology.  Hemodialysis after renal biopsy today  Hold oral  AC. Resume ASA 81 mg daily after renal biopsy   HD per nephro.  Continue torsemide 100 mg daily    Pulmonary embolism (HCC)  Assessment & Plan  Patient presented for worsening chest pain, shortness of breath, and dyspnea on exertion.  Patient was admitted for similar chest pain in early June and was found to have ESRD, started on HD MWF   D-Dimer 4.21  CTA showed subsegmental right lower lobe pulmonary embolism  Placed on heparin drip  7/12/24 Heparin was subtherapeutic this morning, PTT 55. No bolus was given or increase in gtt as patient will have the gtt stopped at 10 am pending IR procedure.  Saturating well on 1 L via nasal cannula    Plan  Hold heparin this morning for upcoming renal biopsy   Transition to warfarin 5 mg daily after renal biopsy     Hypertension  Assessment & Plan  History of HTN, recent medication change after dx of ESRD  Workup at that time notable for IgG kappa monoclonal gammopathy and s/p bone marrow biopsy on 6/12/2023 to assess for multiple myeloma. Bone marrow biopsy with no evidence of plasma cell neoplasm.   HD on MWF schedule.   Presented to ED with uncontrolled /80. Endorsed headaches and blurry vision in his left eye in the ED that has since resolved.      Plan:  Continue hydralazine 25mg q8hrs,amlodipine 10 mg daily, carvedilol 6.25 mg twice daily, Imdur 60 mg once daily, torsemide 100 mg daily  Hold carvedilol if HR <50  Nephrology onbooard   Continue UF with dialysis as BP tolerates     Chest pain  Assessment & Plan  Patient presented with worsening substernal chest pain over several weeks.  H/o chronic angina, CAD, family history of hereditary heart disease on fathers side.   Patient was admitted for similar chest pain in early June and was found to have ESRD, started on HD MWF.  Recent nuclear stress test without significant reversible ischemia  EKG on admission: Sinus Rhythm with PAC's. Repeat EKG showed sinus bradycardia  High-sensitivity troponin negative x 5   BNP  990. D-Dimer 4.21  CTA: subsegmental right lower lobe pulmonary embolism  07/05/24: Updated echocardiogram on unremarkable  Patient blood pressure elevated on admission to 194/80, currently stable.  Patient received 2 doses of nitroglycerin in ED and reports it did not improve his pain, but it improved his elevated blood pressure.  Patient was saturating low 80s on room air on admission and required 3L supplemental O2, which improved saturation to 90s. O2 Sat currently 94% to 95% on room air.  MRSA negative. Viral panel negative.  Multifactorial from acute on chronic anemia, hypertensive urgency, and acute PE   Patient's chest pain has resolved following hemodialysis and the improvement of high blood pressure.   7/9: Telemetry discontinued    Plan-  Per cardiology recs: Continue amlodipine 5 mg daily,Coreg 6.25 mg BID, Imdur 60 mg daily, atorvastatin 80 mg daily. Holding aspirin 81 mg daily    Type 2 diabetes mellitus (HCC)  Assessment & Plan  Lab Results   Component Value Date    HGBA1C 5.7 (H) 06/09/2024       Recent Labs     07/17/24  1522 07/17/24  2119 07/18/24  0737 07/18/24  1105   POCGLU 120 118 85 100       Blood Sugar Average: Last 72 hrs:  (P) [id number]    Anemia  Assessment & Plan    Hemoglobin   Date Value Ref Range Status   07/18/2024 8.9 (L) 12.0 - 17.0 g/dL Final   01/23/2017 14.5 12.6 - 17.7 g/dL Final     Hematocrit   Date Value Ref Range Status   07/18/2024 28.6 (L) 36.5 - 49.3 % Final   01/23/2017 43.8 37.5 - 51.0 % Final       Patient has history of anemia of chronic disease, ESRD  CBC on admission showed hgb 6.6 / hct 21.2  Received 2 units packed RBC's in ED. Hbg 7.6 after transfusion  Hematology following with outpatient f/u scheduled 7/11/2024, patient will reschedule due to overlapping hospital admission   On oral iron twice daily  No active bleeding, on heparin for PE, will switch to oral AC after renal biopsy procedure.  7/8/2024 Patient was transfused 1 unit of blood during  hemodialysis.  Hemoglobin 8.8, hematocrit 27.1  S/P 3 units packed RBCs  Start IV Venofer 100 mg with each dialysis treatment for total 10 doses starting 7/10/24     Plan:  Continue to monitor hemoglobin levels  Transfuse for Hgb <8.0    Hyponatremia  Assessment & Plan  Lab Results   Component Value Date/Time    SODIUM 131 (L) 07/19/2024 06:13 AM     Likely due to fluid overload.     Plan:  Per nephro, fluid restriction to 1.5 L/day  Continue with hemodialysis  F/u BMP in AM    ANGY (obstructive sleep apnea)  Assessment & Plan  Overnight pulse oximetry obtained: highest O2 sat 99%, his lowest O2 sat 73%, with a mean of 91%. Patient had 120 desaturation events <89%.  He spent an 1 hour and 15 minutes in desaturation (~15.82%) and and 1 hour and 34 minutes with desaturation <89% (~19.77%).  Counseled patient on importance of maintaining oxygen saturation as oxygen deprivation can lead to hypertension, heart failure, coronary artery disease, pulmonary hypertension, etc.    Patient was advised of need to wear oxygen at night upon discharge. He would like to wait for formal evaluation via sleep study as an outpatient, but agrees to wear supplemental oxygen currently.    Chronic acquired lymphedema  Assessment & Plan  Chronic LLE lymphedema x 3 years after surgical resection of LLE cyst    Plan:  Advised compression and elevation of lower extremity for symptomatic edema relief    Acute respiratory failure with hypoxia (HCC)-resolved as of 7/6/2024  Assessment & Plan  Patient O2 saturation on admission was 83% on RA  Was placed on 3/L Nasal canula FiO2 32%  CTA showed right lower lobe subsegmental PE  Was placed on heparin    Plan:  On heparin drip that will be held for renal biopsy  Incentive Spirometry           VTE Pharmacologic Prophylaxis: VTE Score: 7 High Risk (Score >/= 5) - Pharmacological DVT Prophylaxis Ordered: heparin drip. Sequential Compression Devices Ordered.    Mobility:   Basic Mobility Inpatient Raw  Score: 24  JH-HLM Goal: 8: Walk 250 feet or more  JH-HLM Achieved: 3: Sit at edge of bed  JH-HLM Goal achieved. Continue to encourage appropriate mobility.    Patient Centered Rounds: I performed bedside rounds with nursing staff today.   Discussions with Specialists or Other Care Team Provider: cardiology, nephrology    Education and Discussions with Family / Patient: Patient declined call to .     Current Length of Stay: 15 day(s)  Current Patient Status: Inpatient   Discharge Plan: Anticipate discharge in 48-72 hrs to home.    Code Status: Level 1 - Full Code    Subjective:  Patient was seen and evaluated at bedside.  No overnight events. Denies chest pain, shortness of breath, vomiting. His renal biopsy schedule for today with anesthesia as backup. HD today after the biopsy. Current plan discussed with patient. He understands and agrees.    Objective:     Vitals:   Temp (24hrs), Av °F (36.7 °C), Min:97.8 °F (36.6 °C), Max:98.3 °F (36.8 °C)    Temp:  [97.8 °F (36.6 °C)-98.3 °F (36.8 °C)] 98.3 °F (36.8 °C)  HR:  [44-63] 55  Resp:  [17-20] 17  BP: (122-165)/(58-83) 138/63  SpO2:  [93 %-100 %] 94 %  Body mass index is 59.36 kg/m².     Input and Output Summary (last 24 hours):     Intake/Output Summary (Last 24 hours) at 2024 1328  Last data filed at 2024 1133  Gross per 24 hour   Intake --   Output 1125 ml   Net -1125 ml       Physical Exam:   Physical Exam  Vitals and nursing note reviewed.   Constitutional:       General: He is not in acute distress.     Appearance: He is obese. He is not ill-appearing.   HENT:      Head: Normocephalic and atraumatic.      Right Ear: External ear normal.      Left Ear: External ear normal.      Nose: Nose normal.      Mouth/Throat:      Mouth: Mucous membranes are moist.      Pharynx: Oropharynx is clear.   Eyes:      Extraocular Movements: Extraocular movements intact.      Conjunctiva/sclera: Conjunctivae normal.   Cardiovascular:      Rate and  Rhythm: Normal rate and regular rhythm.      Pulses: Normal pulses.      Heart sounds: Normal heart sounds. No murmur heard.     No gallop.   Pulmonary:      Effort: Pulmonary effort is normal. No respiratory distress.      Breath sounds: Normal breath sounds. No wheezing.   Abdominal:      General: Bowel sounds are normal. There is no distension.      Palpations: Abdomen is soft.      Tenderness: There is no abdominal tenderness.      Comments: Lymphedema noted to the left lower pannus  Skin under pannus appears irritated with some areas of maceration.  There is also a small draining wound present.   Musculoskeletal:         General: Normal range of motion.      Cervical back: Normal range of motion and neck supple. No rigidity.      Right lower leg: Edema (violaceous skin changes right lower extremity with healing punctate wounds over shin) present.      Left lower leg: Edema (lymphedema) present.      Comments: LLE significantly larger than RLE due to chronic lymphedema. Healed surgical incision noted to left calf.    Skin:     General: Skin is warm and dry.      Comments: R IJV PermCath site clear. Dressing intact.   Neurological:      General: No focal deficit present.      Mental Status: He is alert and oriented to person, place, and time. Mental status is at baseline.   Psychiatric:         Mood and Affect: Mood normal.          Additional Data:     Labs:  Results from last 7 days   Lab Units 07/19/24  0613   WBC Thousand/uL 6.12   HEMOGLOBIN g/dL 9.3*   HEMATOCRIT % 29.7*   PLATELETS Thousands/uL 189     Results from last 7 days   Lab Units 07/19/24  0613   SODIUM mmol/L 131*   POTASSIUM mmol/L 5.0   CHLORIDE mmol/L 96   CO2 mmol/L 27   BUN mg/dL 41*   CREATININE mg/dL 9.40*   ANION GAP mmol/L 8   CALCIUM mg/dL 9.3   GLUCOSE RANDOM mg/dL 88     Results from last 7 days   Lab Units 07/12/24  1623   INR  1.06       Results from last 7 days   Lab Units 07/19/24  1124 07/19/24  0704 07/18/24  2102 07/18/24  1503  07/18/24  1105 07/18/24  0737 07/17/24  2119 07/17/24  1522 07/17/24  1154 07/17/24  0817 07/16/24  2046 07/16/24  1521   POC GLUCOSE mg/dl 87 88 111 122 100 85 118 120 99 89 105 117               Lines/Drains:  Invasive Devices       Peripheral Intravenous Line  Duration             Peripheral IV 07/18/24 Right Forearm 1 day              Hemodialysis Catheter  Duration             HD Permanent Double Catheter 34 days                    Imaging: Reviewed radiology reports from this admission including: chest xray, abdominal/pelvic CT, and ECHO    Recent Cultures (last 7 days):         Last 24 Hours Medication List:   Current Facility-Administered Medications   Medication Dose Route Frequency Provider Last Rate    acetaminophen  650 mg Oral Q6H PRN Mily Martin MD      amLODIPine  10 mg Oral Daily Andrea Longoria MD      atorvastatin  80 mg Oral Daily Mily Martin MD      carvedilol  6.25 mg Oral BID With Meals Andrea Longoria MD      heparin (porcine)  10,000 Units Intravenous Q6H PRN Lorri Maria MD      heparin (porcine)  5,000 Units Intravenous Q6H PRN Lorri Maria MD      hydrALAZINE  25 mg Oral Q8H [facility] Kriss Velazco MD      iron sucrose  100 mg Intravenous After Dialysis Johnny Viramontes MD 0 mg (07/15/24 1311)    isosorbide mononitrate  60 mg Oral Daily Mily Martin MD      lidocaine  1 patch Topical Daily Mily Martin MD      nystatin   Topical BID Lorri Maria MD      ondansetron  4 mg Intravenous Once Cindy Balderas MD      oxyCODONE  10 mg Oral Q4H PRJOHN Maria MD      oxyCODONE  5 mg Oral Q4H PRJOHN Maria MD      polyethylene glycol  17 g Oral Daily PRJOHN Oliveira MD      sevelamer  800 mg Oral TID With Meals Mily Martin MD      torsemide  100 mg Oral Daily Charles Spear MD          Today, Patient Was Seen By: Deya Palomino MD    **Please Note: This note may have been constructed using a voice recognition system.**

## 2024-07-20 ENCOUNTER — APPOINTMENT (INPATIENT)
Dept: CT IMAGING | Facility: HOSPITAL | Age: 44
DRG: 175 | End: 2024-07-20
Payer: COMMERCIAL

## 2024-07-20 LAB
ANION GAP SERPL CALCULATED.3IONS-SCNC: 7 MMOL/L (ref 4–13)
APTT PPP: 36 SECONDS (ref 23–37)
APTT PPP: 37 SECONDS (ref 23–37)
APTT PPP: 41 SECONDS (ref 23–37)
BUN SERPL-MCNC: 33 MG/DL (ref 5–25)
CALCIUM SERPL-MCNC: 9.2 MG/DL (ref 8.4–10.2)
CHLORIDE SERPL-SCNC: 96 MMOL/L (ref 96–108)
CO2 SERPL-SCNC: 30 MMOL/L (ref 21–32)
CREAT SERPL-MCNC: 8.65 MG/DL (ref 0.6–1.3)
ERYTHROCYTE [DISTWIDTH] IN BLOOD BY AUTOMATED COUNT: 13.8 % (ref 11.6–15.1)
ERYTHROCYTE [DISTWIDTH] IN BLOOD BY AUTOMATED COUNT: 13.9 % (ref 11.6–15.1)
GFR SERPL CREATININE-BSD FRML MDRD: 6 ML/MIN/1.73SQ M
GLUCOSE SERPL-MCNC: 103 MG/DL (ref 65–140)
GLUCOSE SERPL-MCNC: 123 MG/DL (ref 65–140)
GLUCOSE SERPL-MCNC: 82 MG/DL (ref 65–140)
GLUCOSE SERPL-MCNC: 85 MG/DL (ref 65–140)
GLUCOSE SERPL-MCNC: 98 MG/DL (ref 65–140)
HCT VFR BLD AUTO: 29.5 % (ref 36.5–49.3)
HCT VFR BLD AUTO: 29.7 % (ref 36.5–49.3)
HGB BLD-MCNC: 9.2 G/DL (ref 12–17)
HGB BLD-MCNC: 9.2 G/DL (ref 12–17)
INR PPP: 1.03 (ref 0.84–1.19)
INR PPP: 1.03 (ref 0.84–1.19)
MAGNESIUM SERPL-MCNC: 2 MG/DL (ref 1.9–2.7)
MCH RBC QN AUTO: 28.9 PG (ref 26.8–34.3)
MCH RBC QN AUTO: 29.3 PG (ref 26.8–34.3)
MCHC RBC AUTO-ENTMCNC: 31 G/DL (ref 31.4–37.4)
MCHC RBC AUTO-ENTMCNC: 31.2 G/DL (ref 31.4–37.4)
MCV RBC AUTO: 93 FL (ref 82–98)
MCV RBC AUTO: 94 FL (ref 82–98)
PHOSPHATE SERPL-MCNC: 5.2 MG/DL (ref 2.7–4.5)
PLATELET # BLD AUTO: 185 THOUSANDS/UL (ref 149–390)
PLATELET # BLD AUTO: 189 THOUSANDS/UL (ref 149–390)
PMV BLD AUTO: 8.9 FL (ref 8.9–12.7)
PMV BLD AUTO: 9.2 FL (ref 8.9–12.7)
POTASSIUM SERPL-SCNC: 4.9 MMOL/L (ref 3.5–5.3)
PROTHROMBIN TIME: 14.1 SECONDS (ref 11.6–14.5)
PROTHROMBIN TIME: 14.2 SECONDS (ref 11.6–14.5)
RBC # BLD AUTO: 3.14 MILLION/UL (ref 3.88–5.62)
RBC # BLD AUTO: 3.18 MILLION/UL (ref 3.88–5.62)
SODIUM SERPL-SCNC: 133 MMOL/L (ref 135–147)
WBC # BLD AUTO: 6.23 THOUSAND/UL (ref 4.31–10.16)
WBC # BLD AUTO: 6.24 THOUSAND/UL (ref 4.31–10.16)

## 2024-07-20 PROCEDURE — 83735 ASSAY OF MAGNESIUM: CPT | Performed by: INTERNAL MEDICINE

## 2024-07-20 PROCEDURE — 85027 COMPLETE CBC AUTOMATED: CPT | Performed by: INTERNAL MEDICINE

## 2024-07-20 PROCEDURE — 74176 CT ABD & PELVIS W/O CONTRAST: CPT

## 2024-07-20 PROCEDURE — 85610 PROTHROMBIN TIME: CPT | Performed by: INTERNAL MEDICINE

## 2024-07-20 PROCEDURE — 84100 ASSAY OF PHOSPHORUS: CPT | Performed by: INTERNAL MEDICINE

## 2024-07-20 PROCEDURE — 99232 SBSQ HOSP IP/OBS MODERATE 35: CPT | Performed by: INTERNAL MEDICINE

## 2024-07-20 PROCEDURE — 85730 THROMBOPLASTIN TIME PARTIAL: CPT

## 2024-07-20 PROCEDURE — 85610 PROTHROMBIN TIME: CPT

## 2024-07-20 PROCEDURE — 85730 THROMBOPLASTIN TIME PARTIAL: CPT | Performed by: INTERNAL MEDICINE

## 2024-07-20 PROCEDURE — 80048 BASIC METABOLIC PNL TOTAL CA: CPT | Performed by: INTERNAL MEDICINE

## 2024-07-20 PROCEDURE — 85027 COMPLETE CBC AUTOMATED: CPT

## 2024-07-20 PROCEDURE — 82948 REAGENT STRIP/BLOOD GLUCOSE: CPT

## 2024-07-20 RX ORDER — OXYCODONE HYDROCHLORIDE 10 MG/1
10 TABLET ORAL EVERY 4 HOURS PRN
Status: DISCONTINUED | OUTPATIENT
Start: 2024-07-20 | End: 2024-07-26 | Stop reason: HOSPADM

## 2024-07-20 RX ORDER — ACETAMINOPHEN 325 MG/1
975 TABLET ORAL EVERY 8 HOURS SCHEDULED
Status: DISCONTINUED | OUTPATIENT
Start: 2024-07-20 | End: 2024-07-26 | Stop reason: HOSPADM

## 2024-07-20 RX ORDER — OXYCODONE HYDROCHLORIDE 5 MG/1
5 TABLET ORAL EVERY 4 HOURS PRN
Status: DISCONTINUED | OUTPATIENT
Start: 2024-07-20 | End: 2024-07-26 | Stop reason: HOSPADM

## 2024-07-20 RX ORDER — LIDOCAINE 50 MG/G
3 PATCH TOPICAL DAILY
Status: DISCONTINUED | OUTPATIENT
Start: 2024-07-20 | End: 2024-07-26 | Stop reason: HOSPADM

## 2024-07-20 RX ORDER — HEPARIN SODIUM 1000 [USP'U]/ML
5000 INJECTION, SOLUTION INTRAVENOUS; SUBCUTANEOUS EVERY 6 HOURS PRN
Status: DISCONTINUED | OUTPATIENT
Start: 2024-07-20 | End: 2024-07-26

## 2024-07-20 RX ORDER — WARFARIN SODIUM 5 MG/1
5 TABLET ORAL
Status: DISCONTINUED | OUTPATIENT
Start: 2024-07-20 | End: 2024-07-21

## 2024-07-20 RX ORDER — HEPARIN SODIUM 1000 [USP'U]/ML
10000 INJECTION, SOLUTION INTRAVENOUS; SUBCUTANEOUS ONCE
Status: COMPLETED | OUTPATIENT
Start: 2024-07-20 | End: 2024-07-20

## 2024-07-20 RX ORDER — HEPARIN SODIUM 10000 [USP'U]/100ML
3-30 INJECTION, SOLUTION INTRAVENOUS
Status: DISCONTINUED | OUTPATIENT
Start: 2024-07-20 | End: 2024-07-26

## 2024-07-20 RX ORDER — LANOLIN ALCOHOL/MO/W.PET/CERES
3 CREAM (GRAM) TOPICAL
Status: DISCONTINUED | OUTPATIENT
Start: 2024-07-20 | End: 2024-07-26 | Stop reason: HOSPADM

## 2024-07-20 RX ORDER — HYDROMORPHONE HCL/PF 1 MG/ML
0.5 SYRINGE (ML) INJECTION
Status: DISCONTINUED | OUTPATIENT
Start: 2024-07-20 | End: 2024-07-26 | Stop reason: HOSPADM

## 2024-07-20 RX ORDER — ONDANSETRON 2 MG/ML
4 INJECTION INTRAMUSCULAR; INTRAVENOUS EVERY 8 HOURS PRN
Status: DISCONTINUED | OUTPATIENT
Start: 2024-07-20 | End: 2024-07-26 | Stop reason: HOSPADM

## 2024-07-20 RX ORDER — HEPARIN SODIUM 1000 [USP'U]/ML
10000 INJECTION, SOLUTION INTRAVENOUS; SUBCUTANEOUS EVERY 6 HOURS PRN
Status: DISCONTINUED | OUTPATIENT
Start: 2024-07-20 | End: 2024-07-26

## 2024-07-20 RX ADMIN — OXYCODONE HYDROCHLORIDE 10 MG: 10 TABLET ORAL at 11:17

## 2024-07-20 RX ADMIN — HEPARIN SODIUM 10000 UNITS: 1000 INJECTION INTRAVENOUS; SUBCUTANEOUS at 10:29

## 2024-07-20 RX ADMIN — CARVEDILOL 6.25 MG: 6.25 TABLET, FILM COATED ORAL at 08:02

## 2024-07-20 RX ADMIN — HYDROMORPHONE HYDROCHLORIDE 0.5 MG: 1 INJECTION, SOLUTION INTRAMUSCULAR; INTRAVENOUS; SUBCUTANEOUS at 13:19

## 2024-07-20 RX ADMIN — HEPARIN SODIUM 18 UNITS/KG/HR: 10000 INJECTION, SOLUTION INTRAVENOUS at 06:00

## 2024-07-20 RX ADMIN — AMLODIPINE BESYLATE 10 MG: 10 TABLET ORAL at 08:02

## 2024-07-20 RX ADMIN — SEVELAMER HYDROCHLORIDE 800 MG: 800 TABLET ORAL at 17:25

## 2024-07-20 RX ADMIN — HYDRALAZINE HYDROCHLORIDE 25 MG: 25 TABLET ORAL at 05:19

## 2024-07-20 RX ADMIN — OXYCODONE HYDROCHLORIDE 10 MG: 10 TABLET ORAL at 15:27

## 2024-07-20 RX ADMIN — ACETAMINOPHEN 975 MG: 325 TABLET, FILM COATED ORAL at 01:58

## 2024-07-20 RX ADMIN — ACETAMINOPHEN 975 MG: 325 TABLET, FILM COATED ORAL at 21:41

## 2024-07-20 RX ADMIN — HYDROMORPHONE HYDROCHLORIDE 0.5 MG: 1 INJECTION, SOLUTION INTRAMUSCULAR; INTRAVENOUS; SUBCUTANEOUS at 23:52

## 2024-07-20 RX ADMIN — NYSTATIN: 100000 POWDER TOPICAL at 08:04

## 2024-07-20 RX ADMIN — ISOSORBIDE MONONITRATE 60 MG: 60 TABLET, EXTENDED RELEASE ORAL at 08:02

## 2024-07-20 RX ADMIN — HYDROMORPHONE HYDROCHLORIDE 0.5 MG: 1 INJECTION, SOLUTION INTRAMUSCULAR; INTRAVENOUS; SUBCUTANEOUS at 04:59

## 2024-07-20 RX ADMIN — OXYCODONE HYDROCHLORIDE 10 MG: 10 TABLET ORAL at 01:57

## 2024-07-20 RX ADMIN — HYDRALAZINE HYDROCHLORIDE 25 MG: 25 TABLET ORAL at 14:37

## 2024-07-20 RX ADMIN — HYDROMORPHONE HYDROCHLORIDE 0.5 MG: 1 INJECTION, SOLUTION INTRAMUSCULAR; INTRAVENOUS; SUBCUTANEOUS at 16:39

## 2024-07-20 RX ADMIN — HYDRALAZINE HYDROCHLORIDE 25 MG: 25 TABLET ORAL at 21:42

## 2024-07-20 RX ADMIN — TORSEMIDE 100 MG: 100 TABLET ORAL at 08:02

## 2024-07-20 RX ADMIN — ACETAMINOPHEN 975 MG: 325 TABLET, FILM COATED ORAL at 14:37

## 2024-07-20 RX ADMIN — CARVEDILOL 6.25 MG: 6.25 TABLET, FILM COATED ORAL at 17:25

## 2024-07-20 RX ADMIN — HYDROMORPHONE HYDROCHLORIDE 0.5 MG: 1 INJECTION, SOLUTION INTRAMUSCULAR; INTRAVENOUS; SUBCUTANEOUS at 09:35

## 2024-07-20 RX ADMIN — OXYCODONE HYDROCHLORIDE 10 MG: 10 TABLET ORAL at 21:42

## 2024-07-20 RX ADMIN — OXYCODONE HYDROCHLORIDE 10 MG: 10 TABLET ORAL at 07:16

## 2024-07-20 RX ADMIN — HEPARIN SODIUM 22 UNITS/KG/HR: 10000 INJECTION, SOLUTION INTRAVENOUS at 21:41

## 2024-07-20 RX ADMIN — HEPARIN SODIUM 18 UNITS/KG/HR: 10000 INJECTION, SOLUTION INTRAVENOUS at 10:23

## 2024-07-20 RX ADMIN — ONDANSETRON 4 MG: 2 INJECTION INTRAMUSCULAR; INTRAVENOUS at 12:46

## 2024-07-20 RX ADMIN — SEVELAMER HYDROCHLORIDE 800 MG: 800 TABLET ORAL at 08:02

## 2024-07-20 RX ADMIN — NYSTATIN: 100000 POWDER TOPICAL at 17:46

## 2024-07-20 RX ADMIN — HEPARIN SODIUM 10000 UNITS: 1000 INJECTION INTRAVENOUS; SUBCUTANEOUS at 17:25

## 2024-07-20 RX ADMIN — HEPARIN SODIUM 10000 UNITS: 1000 INJECTION INTRAVENOUS; SUBCUTANEOUS at 06:00

## 2024-07-20 RX ADMIN — SEVELAMER HYDROCHLORIDE 800 MG: 800 TABLET ORAL at 12:46

## 2024-07-20 RX ADMIN — ATORVASTATIN CALCIUM 80 MG: 40 TABLET, FILM COATED ORAL at 08:02

## 2024-07-20 RX ADMIN — WARFARIN SODIUM 5 MG: 5 TABLET ORAL at 17:25

## 2024-07-20 NOTE — PROGRESS NOTES
[facility]  Progress Note  Name: Joaquin Frankel I  MRN: [id number]  Unit/Bed#: S [facility] I Date of Admission: 7/4/2024   Date of Service: 7/20/2024 I Hospital Day: 16    Assessment & Plan   * Pulmonary embolism (HCC)  Assessment & Plan  Patient presented for worsening chest pain, shortness of breath, and dyspnea on exertion.  Patient was admitted for similar chest pain in early June and was found to have ESRD, started on HD MWF   D-Dimer 4.21  CTA showed subsegmental right lower lobe pulmonary embolism  Placed on heparin drip  7/12/24 Heparin was subtherapeutic this morning, PTT 55. No bolus was given or increase in gtt as patient will have the gtt stopped at 10 am pending IR procedure.  Saturating well on 1 L via nasal cannula    Plan:  On IV heparing, bridging to warfarin 5 mg daily at 6 pm this evening  Goal INR 2-3, check in the morning  Monitor respiratory status, maintain o2 sat above 90% with supplementary O2 as needed    ESRD (end stage renal disease) on dialysis (HCC)  Assessment & Plan  Lab Results   Component Value Date    EGFR 6 07/19/2024    EGFR 7 07/18/2024    EGFR 5 07/17/2024    CREATININE 9.40 (H) 07/19/2024    CREATININE 7.66 (H) 07/18/2024    CREATININE 9.55 (H) 07/17/2024     Patient ESRD on Dialysis 3 times a week, MWF  Elevated , c/o of orthopnea   Etiology of CLOVIS: Etiology not entirely clear - Possible MGRS? Pattern of disease progression is not typical for diabetic nephropathy.   Etiology of CKD: Suspected due to diabetic nephropathy, hypertensive nephrosclerosis, obesity related renal dysfunction  Previous baseline creatinine 1.3-1.6 w/ prior episodes of CLOVIS  Serologic workup negative except for IgG kappa on SPEP and UPEP.  Cryoglobulin negative, ANCA negative, anti-dsDNA negative, anti-GBM negative, JOSE ROBERTO negative, C4 34, C3 118  Bone marrow biopsy 6/12/2024: negative for plasma cell neoplasm.   CRRT initiated 6/7/2024. Peak creatinine 10.4  Last  dialyzed 7/17/2024   Dry weight decreased to 172.5 kg   electrolytes and acid/base stable  7/12 renal biopsy was not performed due to hypertension despite sedation and antihypertensive medications. Rescheduled for Monday 7/15/24  7/15 renal biopsy was not completed due to oxygen saturation of 76-89% during dialysis.  Likely due to ANGY/OHS.  Agreed to wear oxygen via nasal cannula.  7/19 Biopsy will be attempted under local anesthesia and very light sedation with ultrasound-guidance in an attempt to limit need for possible intubation    Plan:  Continue HD per nephrology  On torsemide 100 mg daily  Monitor I/O, Daily weight  Trend BMP and electrolytes      Type 2 diabetes mellitus (HCC)  Assessment & Plan  Lab Results   Component Value Date    HGBA1C 5.7 (H) 06/09/2024       Recent Labs     07/17/24  1522 07/17/24  2119 07/18/24  0737 07/18/24  1105   POCGLU 120 118 85 100       Blood Sugar Average: Last 72 hrs:  (P) [id number]    SSI  Hypoglycemia protocol      Anemia  Assessment & Plan    Hemoglobin   Date Value Ref Range Status   07/18/2024 8.9 (L) 12.0 - 17.0 g/dL Final   01/23/2017 14.5 12.6 - 17.7 g/dL Final     Hematocrit   Date Value Ref Range Status   07/18/2024 28.6 (L) 36.5 - 49.3 % Final   01/23/2017 43.8 37.5 - 51.0 % Final       Patient has history of anemia of chronic disease, ESRD  CBC on admission showed hgb 6.6 / hct 21.2  Received 2 units packed RBC's in ED. Hbg 7.6 after transfusion  Hematology following with outpatient f/u scheduled 7/11/2024, patient will reschedule due to overlapping hospital admission   On oral iron twice daily  No active bleeding, on heparin for PE, will switch to oral AC after renal biopsy procedure.  7/8/2024 Patient was transfused 1 unit of blood during hemodialysis.  Hemoglobin 8.8, hematocrit 27.1  S/P 3 units packed RBCs  Start IV Venofer 100 mg with each dialysis treatment for total 10 doses starting 7/10/24     Plan:  Continue to monitor hemoglobin  levels  Transfuse for Hgb <8.0    ANGY (obstructive sleep apnea)  Assessment & Plan  Overnight pulse oximetry obtained: highest O2 sat 99%, his lowest O2 sat 73%, with a mean of 91%. Patient had 120 desaturation events <89%.  He spent an 1 hour and 15 minutes in desaturation (~15.82%) and and 1 hour and 34 minutes with desaturation <89% (~19.77%).  Counseled patient on importance of maintaining oxygen saturation as oxygen deprivation can lead to hypertension, heart failure, coronary artery disease, pulmonary hypertension, etc.    Patient was advised of need to wear oxygen at night upon discharge. He would like to wait for formal evaluation via sleep study as an outpatient, but agrees to wear supplemental oxygen currently.    Chronic acquired lymphedema  Assessment & Plan  Chronic LLE lymphedema x 3 years after surgical resection of LLE cyst    Plan:  Advised compression and elevation of lower extremity for symptomatic edema relief    Chest pain  Assessment & Plan  Patient presented with worsening substernal chest pain over several weeks.  H/o chronic angina, CAD, family history of hereditary heart disease on fathers side.   Patient was admitted for similar chest pain in early June and was found to have ESRD, started on HD MWF.  Recent nuclear stress test without significant reversible ischemia  EKG on admission: Sinus Rhythm with PAC's. Repeat EKG showed sinus bradycardia  High-sensitivity troponin negative x 5   . D-Dimer 4.21  CTA: subsegmental right lower lobe pulmonary embolism  07/05/24: Updated echocardiogram on unremarkable  Patient blood pressure elevated on admission to 194/80, currently stable.  Patient received 2 doses of nitroglycerin in ED and reports it did not improve his pain, but it improved his elevated blood pressure.  Patient was saturating low 80s on room air on admission and required 3L supplemental O2, which improved saturation to 90s. O2 Sat currently 94% to 95% on room air.  MRSA  negative. Viral panel negative.  Multifactorial from acute on chronic anemia, hypertensive urgency, and acute PE   Patient's chest pain has resolved following hemodialysis and the improvement of high blood pressure.   7/9: Telemetry discontinued    Plan-  Per cardiology recs: Continue amlodipine 5 mg daily,Coreg 6.25 mg BID, Imdur 60 mg daily, atorvastatin 80 mg daily. Holding aspirin 81 mg daily    Hyponatremia  Assessment & Plan  Lab Results   Component Value Date/Time    SODIUM 131 (L) 07/19/2024 06:13 AM     Likely due to fluid overload.     Plan:  Per nephro, fluid restriction to 1.5 L/day  Continue with hemodialysis  F/u BMP in AM    Hypertension  Assessment & Plan  History of HTN, recent medication change after dx of ESRD  Workup at that time notable for IgG kappa monoclonal gammopathy and s/p bone marrow biopsy on 6/12/2023 to assess for multiple myeloma. Bone marrow biopsy with no evidence of plasma cell neoplasm.   HD on MWF schedule.   Presented to ED with uncontrolled /80. Endorsed headaches and blurry vision in his left eye in the ED that has since resolved.      Plan:  Continue hydralazine 25mg q8hrs,amlodipine 10 mg daily, carvedilol 6.25 mg twice daily, Imdur 60 mg once daily, torsemide 100 mg daily  Hold carvedilol if HR <50  Nephrology onbooard   Continue UF with dialysis as BP tolerates     Acute respiratory failure with hypoxia (HCC)-resolved as of 7/6/2024  Assessment & Plan  Patient O2 saturation on admission was 83% on RA  Was placed on 3/L Nasal canula FiO2 32%  CTA showed right lower lobe subsegmental PE  Was placed on heparin    Plan:  See PE  Continue supplementary oxygen to maintain o2 sat >90%  Incentive Spirometry               VTE Pharmacologic Prophylaxis: VTE Score: 7 High Risk (Score >/= 5) - Pharmacological DVT Prophylaxis Ordered: heparin. Sequential Compression Devices Ordered.    Mobility:   Basic Mobility Inpatient Raw Score: 24  -[facility] Goal: 8: Walk 250 feet or  more  JH-HLM Achieved: 6: Walk 10 steps or more  JH-HLM Goal achieved. Continue to encourage appropriate mobility.    Patient Centered Rounds: I performed bedside rounds with nursing staff today.  Discussions with Specialists or Other Care Team Provider: Nephrology    Education and Discussions with Family / Patient: Patient declined call to .     Current Length of Stay: 16 day(s)  Current Patient Status: Inpatient   Discharge Plan: Anticipate discharge in 48-72 hrs to home.    Code Status: Level 1 - Full Code    Subjective:   Patient had pain at bx site throughout the night.  Otherwise no issues and he had no shortness of breath or chest pain. Patient able to ambulate without much dyspnea.  Eating and drinking well. Endorses some urination    Objective:     Vitals:   Temp (24hrs), Av.3 °F (36.3 °C), Min:96.7 °F (35.9 °C), Max:98.7 °F (37.1 °C)    Temp:  [96.7 °F (35.9 °C)-98.7 °F (37.1 °C)] 97.2 °F (36.2 °C)  HR:  [53-71] 61  Resp:  [16-20] 20  BP: (115-148)/() 136/68  SpO2:  [87 %-97 %] 95 %  Body mass index is 58.98 kg/m².     Input and Output Summary (last 24 hours):     Intake/Output Summary (Last 24 hours) at 2024 1422  Last data filed at 2024 1117  Gross per 24 hour   Intake 2000 ml   Output 3012 ml   Net -1012 ml       Physical Exam:   Physical Exam  Vitals and nursing note reviewed.   Constitutional:       General: He is not in acute distress.     Appearance: He is well-developed. He is obese.   HENT:      Head: Normocephalic and atraumatic.   Eyes:      Extraocular Movements: Extraocular movements intact.      Conjunctiva/sclera: Conjunctivae normal.      Pupils: Pupils are equal, round, and reactive to light.   Cardiovascular:      Rate and Rhythm: Normal rate and regular rhythm.      Heart sounds: Normal heart sounds. No murmur heard.  Pulmonary:      Effort: Pulmonary effort is normal. No respiratory distress.      Breath sounds: Normal breath sounds. No decreased breath  sounds, wheezing, rhonchi or rales.   Abdominal:      Palpations: Abdomen is soft.      Tenderness: There is no abdominal tenderness.   Musculoskeletal:         General: No swelling.      Cervical back: Neck supple.   Skin:     General: Skin is warm and dry.      Capillary Refill: Capillary refill takes less than 2 seconds.      Comments: Bx site without erythema or swelling   Neurological:      General: No focal deficit present.      Mental Status: He is alert and oriented to person, place, and time.   Psychiatric:         Mood and Affect: Mood normal.          Additional Data:     Labs:  Results from last 7 days   Lab Units 07/20/24  1020   WBC Thousand/uL 6.24   HEMOGLOBIN g/dL 9.2*   HEMATOCRIT % 29.5*   PLATELETS Thousands/uL 189     Results from last 7 days   Lab Units 07/20/24  0538   SODIUM mmol/L 133*   POTASSIUM mmol/L 4.9   CHLORIDE mmol/L 96   CO2 mmol/L 30   BUN mg/dL 33*   CREATININE mg/dL 8.65*   ANION GAP mmol/L 7   CALCIUM mg/dL 9.2   GLUCOSE RANDOM mg/dL 85     Results from last 7 days   Lab Units 07/20/24  1020   INR  1.03     Results from last 7 days   Lab Units 07/20/24  1132 07/20/24  0739 07/19/24  2157 07/19/24  1626 07/19/24  1124 07/19/24  0704 07/18/24  2102 07/18/24  1503 07/18/24  1105 07/18/24  0737 07/17/24  2119 07/17/24  1522   POC GLUCOSE mg/dl 123 82 98 84 87 88 111 122 100 85 118 120               Lines/Drains:  Invasive Devices       Peripheral Intravenous Line  Duration             Peripheral IV 07/18/24 Right Forearm 2 days              Hemodialysis Catheter  Duration             HD Permanent Double Catheter 35 days                          Imaging: Reviewed radiology reports from this admission including: chest CT scan and abdominal/pelvic CT    Recent Cultures (last 7 days):         Last 24 Hours Medication List:   Current Facility-Administered Medications   Medication Dose Route Frequency Provider Last Rate    acetaminophen  975 mg Oral Q8H SHAZIA Martin DO       amLODIPine  10 mg Oral Daily Andrea Longoria MD      atorvastatin  80 mg Oral Daily Mily Martin MD      carvedilol  6.25 mg Oral BID With Meals Andrea Longoria MD      heparin (porcine)  3-30 Units/kg/hr (Order-Specific) Intravenous Titrated Job Gutierrez MD 18 Units/kg/hr (07/20/24 1023)    heparin (porcine)  10,000 Units Intravenous Q6H PRN Job Gutierrez MD      heparin (porcine)  5,000 Units Intravenous Q6H PRN Job Gutierrez MD      hydrALAZINE  25 mg Oral Q8H [facility] Kriss Velazco MD      HYDROmorphone  0.5 mg Intravenous Q3H PRN Cary Wilkinson MD      iron sucrose  100 mg Intravenous After Dialysis Johnny Viramontes MD 0 mg (07/15/24 1311)    isosorbide mononitrate  60 mg Oral Daily Mily Martin MD      lidocaine  3 patch Topical Daily Cary Wilkinson MD      melatonin  3 mg Oral HS Cary Wilkinson MD      naloxone  0.04 mg Intravenous Q1MIN PRN Cary Wilkinson MD      nystatin   Topical BID Lorri Maria MD      ondansetron  4 mg Intravenous Once Cindy Balderas MD      ondansetron  4 mg Intravenous Q8H PRN Cam Hills MD      oxyCODONE  10 mg Oral Q4H PRN Cary Wilkinson MD      oxyCODONE  5 mg Oral Q4H PRN Cary Wilkinson MD      polyethylene glycol  17 g Oral Daily PRN Diane Oliveira MD      sevelamer  800 mg Oral TID With Meals Mily Martin MD      torsemide  100 mg Oral Daily Charles Spear MD      warfarin  5 mg Oral Daily (warfarin) Cam Hills MD          Today, Patient Was Seen By: Cam Hills MD    **Please Note: This note may have been constructed using a voice recognition system.**

## 2024-07-20 NOTE — NURSING NOTE
Entered patient's room with knocking on door, no response. Knocked on bedside table several times, patient asleep in bed.     SpO2 95%, pulse 57; on monitor, respirations 16 counted manually.     FLACC pain score recorded; 0 at rest.    Will continue with current plan of care.

## 2024-07-20 NOTE — QUICK NOTE
"2237: RN stated that patient complaining of 9 out of 10 pain in the lower left back, at the entry site for his kidney biopsy.  Patient received a one-time dose of 0.5 mg Dilaudid around 7 PM prior, but pain resumed.  Vitals were stable.  Came to evaluate patient at bedside, patient stated that he felt like he was \"hit by a truck\".  Patient complaining of some neck pain, as well as some lower back pain.  Upon examination of the site, no bruising, drainage was appreciated.  He had tenderness on palpation of his lower back, below the site of his bandage.  Patient did not appear to be in acute distress, no abdominal rebound tenderness.  Since patient seemed to tolerate 0.5 Dilaudid well, ordered 1 mg IV Dilaudid one-time.    2354: RN stated that patient's pain has persisted despite the Dilaudid, 8 out of 10.  Of note patient received his 5 mg and 10 mg of Oxy as needed.  Lidocaine patches, and scheduled Tylenol was ordered.    0208: RN stated that patient received Tylenol and as needed oxy, patient stating, \"this is bullshit and unreal, how was this supposed to help me when the Dilaudid did nothing for my pain before?\"  Patient demonstrated agitation with nursing staff, refusing ice packs.  Ordered 3 mg melatonin.  Ordered 0.5 mg IV Dilaudid Q3 for breakthrough pain and naloxone as needed.    0505: Patient received first dose of 0.5 mg IV Dilaudid for breakthrough pain.      "

## 2024-07-20 NOTE — PLAN OF CARE
Problem: Potential for Falls  Goal: Patient will remain free of falls  Description: INTERVENTIONS:  - Educate patient/family on patient safety including physical limitations  - Instruct patient to call for assistance with activity   - Consult OT/PT to assist with strengthening/mobility   - Keep Call bell within reach  - Keep bed low and locked with side rails adjusted as appropriate  - Keep care items and personal belongings within reach  - Initiate and maintain comfort rounds  - Make Fall Risk Sign visible to staff  - Offer Toileting every 2 Hours, in advance of need  - Apply yellow socks and bracelet for high fall risk patients  - Consider moving patient to room near nurses station  Outcome: Progressing     Problem: METABOLIC, FLUID AND ELECTROLYTES - ADULT  Goal: Electrolytes maintained within normal limits  Description: INTERVENTIONS:  - Monitor labs and assess patient for signs and symptoms of electrolyte imbalances  - Administer electrolyte replacement as ordered  - Monitor response to electrolyte replacements, including repeat lab results as appropriate  - Instruct patient on fluid and nutrition as appropriate  Outcome: Progressing  Goal: Fluid balance maintained  Description: INTERVENTIONS:  - Monitor labs   - Monitor I/O and WT  - Instruct patient on fluid and nutrition as appropriate  - Assess for signs & symptoms of volume excess or deficit  Outcome: Progressing     Problem: Prexisting or High Potential for Compromised Skin Integrity  Goal: Skin integrity is maintained or improved  Description: INTERVENTIONS:  - Identify patients at risk for skin breakdown  - Assess and monitor skin integrity  - Assess and monitor nutrition and hydration status  - Monitor labs   - Assess for incontinence   - Turn and reposition patient  - Assist with mobility/ambulation  - Relieve pressure over bony prominences  - Avoid friction and shearing  - Provide appropriate hygiene as needed including keeping skin clean and  dry  - Evaluate need for skin moisturizer/barrier cream  - Collaborate with interdisciplinary team   - Patient/family teaching  - Consider wound care consult   Outcome: Progressing     Problem: Nutrition/Hydration-ADULT  Goal: Nutrient/Hydration intake appropriate for improving, restoring or maintaining nutritional needs  Description: Monitor and assess patient's nutrition/hydration status for malnutrition. Collaborate with interdisciplinary team and initiate plan and interventions as ordered.  Monitor patient's weight and dietary intake as ordered or per policy. Utilize nutrition screening tool and intervene as necessary. Determine patient's food preferences and provide high-protein, high-caloric foods as appropriate.     INTERVENTIONS:  - Monitor oral intake, urinary output, labs, and treatment plans  - Assess nutrition and hydration status and recommend course of action  - Evaluate amount of meals eaten  - Assist patient with eating if necessary   - Allow adequate time for meals  - Recommend/ encourage appropriate diets, oral nutritional supplements, and vitamin/mineral supplements  - Order, calculate, and assess calorie counts as needed  - Assess need for intravenous fluids  - Provide specific nutrition/hydration education as appropriate  - Include patient/family/caregiver in decisions related to nutrition  Outcome: Progressing     Problem: PAIN - ADULT  Goal: Verbalizes/displays adequate comfort level or baseline comfort level  Description: Interventions:  - Encourage patient to monitor pain and request assistance  - Assess pain using appropriate pain scale  - Administer analgesics based on type and severity of pain and evaluate response  - Implement non-pharmacological measures as appropriate and evaluate response  - Consider cultural and social influences on pain and pain management  - Notify physician/advanced practitioner if interventions unsuccessful or patient reports new pain  Outcome: Progressing

## 2024-07-21 LAB
ANION GAP SERPL CALCULATED.3IONS-SCNC: 8 MMOL/L (ref 4–13)
APTT PPP: 43 SECONDS (ref 23–37)
APTT PPP: 57 SECONDS (ref 23–37)
APTT PPP: 74 SECONDS (ref 23–37)
APTT PPP: 79 SECONDS (ref 23–37)
BUN SERPL-MCNC: 42 MG/DL (ref 5–25)
CALCIUM SERPL-MCNC: 9.1 MG/DL (ref 8.4–10.2)
CHLORIDE SERPL-SCNC: 96 MMOL/L (ref 96–108)
CO2 SERPL-SCNC: 29 MMOL/L (ref 21–32)
CREAT SERPL-MCNC: 9.86 MG/DL (ref 0.6–1.3)
GFR SERPL CREATININE-BSD FRML MDRD: 5 ML/MIN/1.73SQ M
GLUCOSE SERPL-MCNC: 103 MG/DL (ref 65–140)
GLUCOSE SERPL-MCNC: 105 MG/DL (ref 65–140)
GLUCOSE SERPL-MCNC: 108 MG/DL (ref 65–140)
GLUCOSE SERPL-MCNC: 116 MG/DL (ref 65–140)
GLUCOSE SERPL-MCNC: 86 MG/DL (ref 65–140)
INR PPP: 1.07 (ref 0.84–1.19)
POTASSIUM SERPL-SCNC: 4.7 MMOL/L (ref 3.5–5.3)
PROTHROMBIN TIME: 14.5 SECONDS (ref 11.6–14.5)
SODIUM SERPL-SCNC: 133 MMOL/L (ref 135–147)

## 2024-07-21 PROCEDURE — 85730 THROMBOPLASTIN TIME PARTIAL: CPT | Performed by: INTERNAL MEDICINE

## 2024-07-21 PROCEDURE — 80048 BASIC METABOLIC PNL TOTAL CA: CPT | Performed by: INTERNAL MEDICINE

## 2024-07-21 PROCEDURE — 99232 SBSQ HOSP IP/OBS MODERATE 35: CPT | Performed by: INTERNAL MEDICINE

## 2024-07-21 PROCEDURE — 82948 REAGENT STRIP/BLOOD GLUCOSE: CPT

## 2024-07-21 PROCEDURE — 85610 PROTHROMBIN TIME: CPT | Performed by: INTERNAL MEDICINE

## 2024-07-21 RX ORDER — WARFARIN SODIUM 7.5 MG/1
7.5 TABLET ORAL
Status: DISCONTINUED | OUTPATIENT
Start: 2024-07-21 | End: 2024-07-22

## 2024-07-21 RX ADMIN — OXYCODONE HYDROCHLORIDE 10 MG: 10 TABLET ORAL at 18:00

## 2024-07-21 RX ADMIN — OXYCODONE HYDROCHLORIDE 10 MG: 10 TABLET ORAL at 22:27

## 2024-07-21 RX ADMIN — HYDROMORPHONE HYDROCHLORIDE 0.5 MG: 1 INJECTION, SOLUTION INTRAMUSCULAR; INTRAVENOUS; SUBCUTANEOUS at 14:35

## 2024-07-21 RX ADMIN — HEPARIN SODIUM 26 UNITS/KG/HR: 10000 INJECTION, SOLUTION INTRAVENOUS at 21:32

## 2024-07-21 RX ADMIN — SEVELAMER HYDROCHLORIDE 800 MG: 800 TABLET ORAL at 17:56

## 2024-07-21 RX ADMIN — HYDROMORPHONE HYDROCHLORIDE 0.5 MG: 1 INJECTION, SOLUTION INTRAMUSCULAR; INTRAVENOUS; SUBCUTANEOUS at 10:02

## 2024-07-21 RX ADMIN — AMLODIPINE BESYLATE 10 MG: 10 TABLET ORAL at 09:36

## 2024-07-21 RX ADMIN — ACETAMINOPHEN 975 MG: 325 TABLET, FILM COATED ORAL at 20:36

## 2024-07-21 RX ADMIN — HEPARIN SODIUM 24 UNITS/KG/HR: 10000 INJECTION, SOLUTION INTRAVENOUS at 07:49

## 2024-07-21 RX ADMIN — HYDROMORPHONE HYDROCHLORIDE 0.5 MG: 1 INJECTION, SOLUTION INTRAMUSCULAR; INTRAVENOUS; SUBCUTANEOUS at 05:55

## 2024-07-21 RX ADMIN — ACETAMINOPHEN 975 MG: 325 TABLET, FILM COATED ORAL at 05:55

## 2024-07-21 RX ADMIN — OXYCODONE HYDROCHLORIDE 10 MG: 10 TABLET ORAL at 02:01

## 2024-07-21 RX ADMIN — ATORVASTATIN CALCIUM 80 MG: 40 TABLET, FILM COATED ORAL at 09:36

## 2024-07-21 RX ADMIN — ISOSORBIDE MONONITRATE 60 MG: 60 TABLET, EXTENDED RELEASE ORAL at 09:36

## 2024-07-21 RX ADMIN — HYDRALAZINE HYDROCHLORIDE 25 MG: 25 TABLET ORAL at 20:36

## 2024-07-21 RX ADMIN — HYDRALAZINE HYDROCHLORIDE 25 MG: 25 TABLET ORAL at 14:39

## 2024-07-21 RX ADMIN — CARVEDILOL 6.25 MG: 6.25 TABLET, FILM COATED ORAL at 09:36

## 2024-07-21 RX ADMIN — WARFARIN SODIUM 7.5 MG: 7.5 TABLET ORAL at 17:56

## 2024-07-21 RX ADMIN — TORSEMIDE 100 MG: 100 TABLET ORAL at 09:36

## 2024-07-21 RX ADMIN — SEVELAMER HYDROCHLORIDE 800 MG: 800 TABLET ORAL at 12:48

## 2024-07-21 RX ADMIN — NYSTATIN: 100000 POWDER TOPICAL at 17:56

## 2024-07-21 RX ADMIN — HYDRALAZINE HYDROCHLORIDE 25 MG: 25 TABLET ORAL at 05:55

## 2024-07-21 RX ADMIN — OXYCODONE HYDROCHLORIDE 10 MG: 10 TABLET ORAL at 07:53

## 2024-07-21 RX ADMIN — OXYCODONE HYDROCHLORIDE 10 MG: 10 TABLET ORAL at 12:51

## 2024-07-21 RX ADMIN — HEPARIN SODIUM 26 UNITS/KG/HR: 10000 INJECTION, SOLUTION INTRAVENOUS at 14:41

## 2024-07-21 RX ADMIN — NYSTATIN: 100000 POWDER TOPICAL at 09:38

## 2024-07-21 RX ADMIN — CARVEDILOL 6.25 MG: 6.25 TABLET, FILM COATED ORAL at 16:02

## 2024-07-21 RX ADMIN — HEPARIN SODIUM 5000 UNITS: 1000 INJECTION INTRAVENOUS; SUBCUTANEOUS at 01:28

## 2024-07-21 RX ADMIN — HEPARIN SODIUM 5000 UNITS: 1000 INJECTION INTRAVENOUS; SUBCUTANEOUS at 10:06

## 2024-07-21 RX ADMIN — HYDROMORPHONE HYDROCHLORIDE 0.5 MG: 1 INJECTION, SOLUTION INTRAMUSCULAR; INTRAVENOUS; SUBCUTANEOUS at 20:37

## 2024-07-21 RX ADMIN — SEVELAMER HYDROCHLORIDE 800 MG: 800 TABLET ORAL at 09:36

## 2024-07-21 NOTE — ASSESSMENT & PLAN NOTE
Overnight pulse oximetry obtained: highest O2 sat 99%, his lowest O2 sat 73%, with a mean of 91%. Patient had 120 desaturation events <89%.  He spent an 1 hour and 15 minutes in desaturation (~15.82%) and and 1 hour and 34 minutes with desaturation <89% (~19.77%).  Counseled patient on importance of maintaining oxygen saturation as oxygen deprivation can lead to hypertension, heart failure, coronary artery disease, pulmonary hypertension, etc.    Patient was advised of need to wear oxygen at night upon discharge. He would like to wait for formal evaluation via sleep study as an outpatient, but agrees to wear supplemental oxygen currently.    Plan:  Keep follow-up sleep study appointment.

## 2024-07-21 NOTE — ASSESSMENT & PLAN NOTE
Patient O2 saturation on admission was 83% on RA  Was placed on 3/L Nasal canula FiO2 32%  CTA showed right lower lobe subsegmental PE  Was placed on heparin    Plan:  See PE

## 2024-07-21 NOTE — ASSESSMENT & PLAN NOTE
Lab Results   Component Value Date    EGFR 6 07/24/2024    EGFR 7 07/23/2024    EGFR 5 07/22/2024    CREATININE 9.46 (H) 07/24/2024    CREATININE 8.08 (H) 07/23/2024    CREATININE 10.90 (H) 07/22/2024     Patient ESRD on Dialysis 3 times a week, MWF  Elevated , c/o of orthopnea   Etiology of CLOVIS: Etiology not entirely clear - Possible MGRS? Pattern of disease progression is not typical for diabetic nephropathy.   Etiology of CKD: Suspected due to diabetic nephropathy, hypertensive nephrosclerosis, obesity related renal dysfunction  Previous baseline creatinine 1.3-1.6 w/ prior episodes of CLOVIS  Serologic workup negative except for IgG kappa on SPEP and UPEP.  Cryoglobulin negative, ANCA negative, anti-dsDNA negative, anti-GBM negative, JOSE ROBERTO negative, C4 34, C3 118  Bone marrow biopsy 6/12/2024: negative for plasma cell neoplasm.   CRRT initiated 6/7/2024. Peak creatinine 10.4  7/19 L renal biopsy performed. 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 No evidence of MGRS on biopsy     Plan:  Continue HD   Per nephrology, continue Nifedipine 30 mg daily, carvedilol 6.25 mg BID, Imdur 60 mg daily, torsemide 100 mg daily, sevelamer 800mg TID

## 2024-07-21 NOTE — ASSESSMENT & PLAN NOTE
History of HTN, recent medication change after dx of ESRD  Workup at that time notable for IgG kappa monoclonal gammopathy and s/p bone marrow biopsy on 6/12/2023 to assess for multiple myeloma. Bone marrow biopsy with no evidence of plasma cell neoplasm.   HD on MWF schedule.   Presented to ED with uncontrolled /80. Endorsed headaches and blurry vision in his left eye in the ED that has since resolved.    Plan:  Continue nifedipine 30 mg, carvedilol 6.25 mg twice daily, Imdur 60 mg once daily, torsemide 100 mg daily

## 2024-07-21 NOTE — ASSESSMENT & PLAN NOTE
Lab Results   Component Value Date    HGBA1C 5.7 (H) 06/09/2024       Recent Labs     07/25/24  1706 07/25/24  2138 07/26/24  0717 07/26/24  1056   POCGLU 86 90 95 118       Blood Sugar Average: Last 72 hrs:  (P) 96.5    Counseled patient lifestyle modifications  Continue glimepiride and follow-up with PCP for medication adjustments if needed.

## 2024-07-21 NOTE — PROGRESS NOTES
NEPHROLOGY HOSPITAL PROGRESS NOTE   Joaquin Frankel 44 y.o. male MRN: [id number]  Unit/Bed#: S -01 Encounter: [id number]  Reason for Consult: CLOVIS on CKD    ASSESSMENT and PLAN:  44-year-old male with history of CKD with acute kidney injury on prior admission in June and started on hemodialysis, hypertension, monoclonal gammopathy presented with chest pain and concern for PE on admission.  Nephrology consulted for CLOVIS requiring dialysis.    1.  CLOVIS on CKD.  Baseline creatinine 1.2-1.0.  He had CLOVIS and started on dialysis on June 7, 2024.  Outpatient dialysis unit [facility].  Access is right IJ permacath.  Etiology is unclear, MGRS versus other.  Does not have signs of renal recovery as of yet.  Status post kidney biopsy 07/19, results pending.  Electrolytes and volume status stable today.  Next hemodialysis session will be Monday.    2.  Hyponatremia.  Continue to monitor serum sodium with fluid restriction.    3.  Nephrotic range proteinuria.  Serological workup negative except for IgG kappa on SPEP and UPEP.  Follow-up kidney biopsy results.    4.  Hypertension.  Current Rx: Amlodipine 10 mg daily, Coreg 6.25 mg twice daily, hydralazine 25 mg 3 times daily, Imdur 60 mg daily and torsemide 100 mg daily.  Blood pressure is currently acceptable.  No changes to medications today.    5.  Anemia.  No JOSSELYN due to PE.  Hemoglobin yesterday was stable.    6.  Volume status.  Continue ultrafiltration on dialysis.  Continue torsemide 100 mg daily.    7.  Pain at kidney biopsy site.  Status pos CT abdomen on 07/20 with no evidence of retroperitoneal or perinephric hematoma.    8.  PE.  Subsegmental right lower lobe pulmonary embolism identified on CT PE protocol 07/04/2024.  Currently on IV heparin per primary team.    Discussed with internal medicine team.  After discussion, we agreed that patient is stable for interdialytic period and next hemodialysis session will be tomorrow.    SUBJECTIVE / 24H INTERVAL  HISTORY:  Feeling better today.  Flank pain has improved.  Denies dyspnea.  Denies abdominal pain.    OBJECTIVE:  Current Weight: Weight - Scale: (!) 172 kg (378 lb 6.4 oz)  Vitals:    07/21/24 0500 07/21/24 0557 07/21/24 0600 07/21/24 0753   BP:  146/67  148/79   BP Location:       Pulse:    58   Resp:       Temp:    (!) 97.2 °F (36.2 °C)   TempSrc:       SpO2:    94%   Weight: (!) 172 kg (378 lb 6.4 oz)  (!) 172 kg (378 lb 6.4 oz)    Height:           Intake/Output Summary (Last 24 hours) at 7/21/2024 0756  Last data filed at 7/21/2024 0601  Gross per 24 hour   Intake 2040 ml   Output --   Net 2040 ml     Review of Systems   Constitutional:  Negative for chills and fever.   HENT:  Negative for ear pain and sore throat.    Eyes:  Negative for pain and visual disturbance.   Respiratory:  Negative for cough and shortness of breath.    Cardiovascular:  Negative for chest pain and palpitations.   Gastrointestinal:  Negative for abdominal pain and vomiting.   Genitourinary:  Negative for dysuria and hematuria.   Musculoskeletal:  Negative for arthralgias and back pain.   Skin:  Negative for color change and rash.   Neurological:  Negative for seizures and syncope.   All other systems reviewed and are negative.    Physical Exam  Vitals and nursing note reviewed.   Constitutional:       General: He is not in acute distress.     Appearance: He is well-developed.   HENT:      Head: Normocephalic and atraumatic.   Eyes:      Conjunctiva/sclera: Conjunctivae normal.   Cardiovascular:      Rate and Rhythm: Normal rate and regular rhythm.      Pulses: Normal pulses.      Heart sounds: Normal heart sounds. No murmur heard.     Comments: Right chest wall permacath present  Pulmonary:      Effort: Pulmonary effort is normal. No respiratory distress.      Breath sounds: Normal breath sounds.   Abdominal:      Palpations: Abdomen is soft.      Tenderness: There is no abdominal tenderness.   Musculoskeletal:         General: No  swelling.      Cervical back: Neck supple.      Right lower leg: No edema.      Left lower leg: Edema present.   Skin:     General: Skin is warm and dry.      Capillary Refill: Capillary refill takes less than 2 seconds.   Neurological:      Mental Status: He is alert.   Psychiatric:         Mood and Affect: Mood normal.         Medications:    Current Facility-Administered Medications:     acetaminophen (TYLENOL) tablet 975 mg, 975 mg, Oral, Q8H [facility], Marsha Martin DO, 975 mg at 07/21/24 0555    amLODIPine (NORVASC) tablet 10 mg, 10 mg, Oral, Daily, Andrea Longoria MD, 10 mg at 07/20/24 0802    atorvastatin (LIPITOR) tablet 80 mg, 80 mg, Oral, Daily, Mily Martin MD, 80 mg at 07/20/24 0802    carvedilol (COREG) tablet 6.25 mg, 6.25 mg, Oral, BID With Meals, Andrea Longoria MD, 6.25 mg at 07/20/24 1725    heparin (porcine) 25,000 units in 0.45% NaCl 250 mL infusion (premix), 3-30 Units/kg/hr (Order-Specific), Intravenous, Titrated, Job Gutierrez MD, Last Rate: 30 mL/hr at 07/21/24 0749, 24 Units/kg/hr at 07/21/24 0749    heparin (porcine) injection 10,000 Units, 10,000 Units, Intravenous, Q6H PRN, Job Gutierrez MD, 10,000 Units at 07/20/24 1725    heparin (porcine) injection 5,000 Units, 5,000 Units, Intravenous, Q6H PRN, Job Gutierrez MD, 5,000 Units at 07/21/24 0128    hydrALAZINE (APRESOLINE) tablet 25 mg, 25 mg, Oral, Q8H SHAZIA, Kriss Velazco MD, 25 mg at 07/21/24 0555    HYDROmorphone (DILAUDID) injection 0.5 mg, 0.5 mg, Intravenous, Q3H PRN, Cary Wilkinson MD, 0.5 mg at 07/21/24 0555    iron sucrose (VENOFER) 100 mg in sodium chloride 0.9 % 100 mL IVPB, 100 mg, Intravenous, After Dialysis, Johnny Viramontes MD, Last Rate: 0 mL/hr at 07/15/24 1311, 100 mg at 07/19/24 2109    isosorbide mononitrate (IMDUR) 24 hr tablet 60 mg, 60 mg, Oral, Daily, Mily Martin MD, 60 mg at 07/20/24 0802    lidocaine (LIDODERM) 5 % patch 3 patch, 3 patch, Topical, Daily, Cary Wilkinson MD    melatonin tablet  3 mg, 3 mg, Oral, HS, Cary Wilkinson MD    naloxone (NARCAN) 0.04 mg/mL syringe 0.04 mg, 0.04 mg, Intravenous, Q1MIN PRN, Cary Wilkinson MD    nystatin (MYCOSTATIN) powder, , Topical, BID, Lorri Maria MD, Given at 07/20/24 1746    ondansetron (ZOFRAN) injection 4 mg, 4 mg, Intravenous, Once, Cindy Balderas MD    ondansetron (ZOFRAN) injection 4 mg, 4 mg, Intravenous, Q8H PRN, Cam Hills MD, 4 mg at 07/20/24 1246    oxyCODONE (ROXICODONE) immediate release tablet 10 mg, 10 mg, Oral, Q4H PRN, Cary Wilkinson MD, 10 mg at 07/21/24 0753    oxyCODONE (ROXICODONE) IR tablet 5 mg, 5 mg, Oral, Q4H PRN, Cary Wilkinson MD    polyethylene glycol (MIRALAX) packet 17 g, 17 g, Oral, Daily PRN, Diane Oliveira MD    sevelamer (RENAGEL) tablet 800 mg, 800 mg, Oral, TID With Meals, Mily Martin MD, 800 mg at 07/20/24 1725    torsemide (DEMADEX) tablet 100 mg, 100 mg, Oral, Daily, Charles Spear MD, 100 mg at 07/20/24 0802    warfarin (COUMADIN) tablet 5 mg, 5 mg, Oral, Daily (warfarin), Cam Hills MD, 5 mg at 07/20/24 1725    Laboratory Results:  Results from last 7 days   Lab Units 07/20/24  1020 07/20/24  0538 07/20/24  0536 07/19/24  2020 07/19/24  0613 07/18/24  0535 07/17/24  0735 07/16/24  0549 07/15/24  0459   WBC Thousand/uL 6.24  --  6.23 6.71 6.12 5.56 5.69 5.84 6.60   HEMOGLOBIN g/dL 9.2*  --  9.2* 8.9* 9.3* 8.9* 9.1* 8.7* 8.3*   HEMATOCRIT % 29.5*  --  29.7* 28.6* 29.7* 28.6* 29.0* 27.3* 26.6*   PLATELETS Thousands/uL 189  --  185 174 189 182 201 175 236   POTASSIUM mmol/L  --  4.9  --   --  5.0 4.2 4.4 4.3 4.6   CHLORIDE mmol/L  --  96  --   --  96 95* 93* 95* 96   CO2 mmol/L  --  30  --   --  27 29 30 30 27   BUN mg/dL  --  33*  --   --  41* 31* 40* 32* 46*   CREATININE mg/dL  --  8.65*  --   --  9.40* 7.66* 9.55* 8.00* 10.06*   CALCIUM mg/dL  --  9.2  --   --  9.3 8.7 9.1 8.8 9.1   MAGNESIUM mg/dL  --  2.0  --   --  2.0 1.9 2.1 1.8*  --    PHOSPHORUS mg/dL  --  5.2*  --   --  5.1* 4.5 5.4* 4.3  --        Portions of  "the record may have been created with voice recognition software. Occasional wrong word or \"sound a like\" substitutions may have occurred due to the inherent limitations of voice recognition software. Read the chart carefully and recognize, using context, where substitutions have occurred. If you have any questions, please contact the dictating provider.    "

## 2024-07-21 NOTE — ASSESSMENT & PLAN NOTE
Lab Results   Component Value Date/Time    SODIUM 135 07/24/2024 06:04 AM     Likely due to fluid overload  Resolved

## 2024-07-21 NOTE — ASSESSMENT & PLAN NOTE
Patient presented with worsening substernal chest pain over several weeks.  H/o chronic angina, CAD s/p SABINE, family history of hereditary heart disease on fathers side.   Patient was admitted for similar chest pain in early June and was found to have ESRD, started on HD MWF.  Recent nuclear stress test without significant reversible ischemia  EKG on admission: Sinus Rhythm with PAC's. Repeat EKG showed sinus bradycardia  High-sensitivity troponin negative x 5   . D-Dimer 4.21  CTA: subsegmental right lower lobe pulmonary embolism  07/05/24: Updated echocardiogram on unremarkable  CP Multifactorial from acute on chronic anemia, hypertensive urgency, and acute PE   Patient's chest pain has resolved following hemodialysis and the improvement of high blood pressure.   7/9: Telemetry discontinued    Plan-  Continue nifedipine 30 mg, Coreg 6.25 mg BID, Imdur 60 mg daily, atorvastatin 80 mg daily, torsemide 100 mg daily, ASA, and warfarin 10 mg daily

## 2024-07-21 NOTE — PROGRESS NOTES
[facility]  Progress Note  Name: Joaquin Frankel I  MRN: [id number]  Unit/Bed#: S -01 I Date of Admission: 7/4/2024   Date of Service: 7/21/2024 I Hospital Day: 17    Assessment & Plan   ESRD (end stage renal disease) on dialysis (HCC)  Assessment & Plan  Lab Results   Component Value Date    EGFR 5 07/21/2024    EGFR 6 07/20/2024    EGFR 6 07/19/2024    CREATININE 9.86 (H) 07/21/2024    CREATININE 8.65 (H) 07/20/2024    CREATININE 9.40 (H) 07/19/2024     Patient ESRD on Dialysis 3 times a week, MWF  Elevated , c/o of orthopnea   Etiology of CLOVIS: Etiology not entirely clear - Possible MGRS? Pattern of disease progression is not typical for diabetic nephropathy.   Etiology of CKD: Suspected due to diabetic nephropathy, hypertensive nephrosclerosis, obesity related renal dysfunction  Previous baseline creatinine 1.3-1.6 w/ prior episodes of CLOVIS  Serologic workup negative except for IgG kappa on SPEP and UPEP.  Cryoglobulin negative, ANCA negative, anti-dsDNA negative, anti-GBM negative, JOSE ROBERTO negative, C4 34, C3 118  Bone marrow biopsy 6/12/2024: negative for plasma cell neoplasm.   CRRT initiated 6/7/2024. Peak creatinine 10.4  7/12 renal biopsy was not performed due to hypertension despite sedation and antihypertensive medications. Rescheduled for Monday 7/15/24  7/15 renal biopsy was not completed due to oxygen saturation of 76-89% during dialysis.  Likely due to ANGY/OHS.  Agreed to wear oxygen via nasal cannula.  7/19 L renal biopsy performed.  Results pending    Plan:  Continue HD per nephrology  On torsemide 100 mg daily  Monitor I/O, Daily weight  Trend BMP and electrolytes      * Pulmonary embolism (HCC)  Assessment & Plan  Patient presented for worsening chest pain, shortness of breath, and dyspnea on exertion.  Patient was admitted for similar chest pain in early June and was found to have ESRD, started on HD MWF   D-Dimer 4.21  CTA showed subsegmental right lower  lobe pulmonary embolism  7/12/24 Heparin was subtherapeutic this morning, PTT 55. No bolus was given or increase in gtt as patient will have the gtt stopped at 10 am pending IR procedure.  Saturating well     Plan:  On IV heparing, bridging to warfarin 7.5 mg   Goal INR 2-3  Monitor respiratory status, maintain o2 sat above 90% with supplementary O2 as needed    Hypertension  Assessment & Plan  History of HTN, recent medication change after dx of ESRD  Workup at that time notable for IgG kappa monoclonal gammopathy and s/p bone marrow biopsy on 6/12/2023 to assess for multiple myeloma. Bone marrow biopsy with no evidence of plasma cell neoplasm.   HD on MWF schedule.   Presented to ED with uncontrolled /80. Endorsed headaches and blurry vision in his left eye in the ED that has since resolved.      Plan:  Continue hydralazine 25mg q8hrs,amlodipine 10 mg daily, carvedilol 6.25 mg twice daily, Imdur 60 mg once daily, torsemide 100 mg daily  Hold carvedilol if HR <50  Nephrology onbooard   Continue UF with dialysis as BP tolerates     Chest pain  Assessment & Plan  Patient presented with worsening substernal chest pain over several weeks.  H/o chronic angina, CAD, family history of hereditary heart disease on fathers side.   Patient was admitted for similar chest pain in early June and was found to have ESRD, started on HD MWF.  Recent nuclear stress test without significant reversible ischemia  EKG on admission: Sinus Rhythm with PAC's. Repeat EKG showed sinus bradycardia  High-sensitivity troponin negative x 5   . D-Dimer 4.21  CTA: subsegmental right lower lobe pulmonary embolism  07/05/24: Updated echocardiogram on unremarkable  CP Multifactorial from acute on chronic anemia, hypertensive urgency, and acute PE   Patient's chest pain has resolved following hemodialysis and the improvement of high blood pressure.   7/9: Telemetry discontinued    Plan-  Per cardiology recs: Continue amlodipine 5 mg  daily,Coreg 6.25 mg BID, Imdur 60 mg daily, atorvastatin 80 mg daily.     Type 2 diabetes mellitus (HCC)  Assessment & Plan  Lab Results   Component Value Date    HGBA1C 5.7 (H) 06/09/2024       Recent Labs     07/20/24  1536 07/20/24  2117 07/21/24  0855 07/21/24  1134   POCGLU 103 98 105 108       Blood Sugar Average: Last 72 hrs:  (P) [id number]    SSI  Hypoglycemia protocol      Anemia  Assessment & Plan    Hemoglobin   Date Value Ref Range Status   07/20/2024 9.2 (L) 12.0 - 17.0 g/dL Final   01/23/2017 14.5 12.6 - 17.7 g/dL Final     Hematocrit   Date Value Ref Range Status   07/20/2024 29.5 (L) 36.5 - 49.3 % Final   01/23/2017 43.8 37.5 - 51.0 % Final       Patient has history of anemia of chronic disease, ESRD  CBC on admission showed hgb 6.6 / hct 21.2  Received 2 units packed RBC's in ED. Hbg 7.6 after transfusion  Hematology following with outpatient f/u scheduled 7/11/2024, patient will reschedule due to overlapping hospital admission   On oral iron twice daily  No active bleeding, on heparin for PE, will switch to oral AC after renal biopsy procedure.  7/8/2024 Patient was transfused 1 unit of blood during hemodialysis. Hemoglobin 8.8, hematocrit 27.1  S/P 3 units packed RBCs  Start IV Venofer 100 mg with each dialysis treatment for total 10 doses starting 7/10/24     Plan:  Hemoglobin stable  Transfuse for Hgb <8.0    Hyponatremia  Assessment & Plan  Lab Results   Component Value Date/Time    SODIUM 133 (L) 07/21/2024 09:07 AM     Likely due to fluid overload.     Plan:  Per nephro, fluid restriction to 1.5 L/day  Continue with hemodialysis  F/u BMP in AM    ANGY (obstructive sleep apnea)  Assessment & Plan  Overnight pulse oximetry obtained: highest O2 sat 99%, his lowest O2 sat 73%, with a mean of 91%. Patient had 120 desaturation events <89%.  He spent an 1 hour and 15 minutes in desaturation (~15.82%) and and 1 hour and 34 minutes with desaturation <89% (~19.77%).  Counseled patient on  "importance of maintaining oxygen saturation as oxygen deprivation can lead to hypertension, heart failure, coronary artery disease, pulmonary hypertension, etc.    Patient was advised of need to wear oxygen at night upon discharge. He would like to wait for formal evaluation via sleep study as an outpatient, but agrees to wear supplemental oxygen currently.    Chronic acquired lymphedema  Assessment & Plan  Chronic LLE lymphedema x 3 years after surgical resection of LLE cyst    Plan:  Advised compression and elevation of lower extremity for symptomatic edema relief    Acute respiratory failure with hypoxia (HCC)-resolved as of 7/6/2024  Assessment & Plan  Patient O2 saturation on admission was 83% on RA  Was placed on 3/L Nasal canula FiO2 32%  CTA showed right lower lobe subsegmental PE  Was placed on heparin    Plan:  See PE  Continue supplementary oxygen to maintain o2 sat >90%  Incentive Spirometry           VTE Pharmacologic Prophylaxis: VTE Score: 7 High Risk (Score >/= 5) - Pharmacological DVT Prophylaxis Ordered: heparin drip. Sequential Compression Devices Ordered.    Mobility:   Basic Mobility Inpatient Raw Score: 24  JH-HLM Goal: 8: Walk 250 feet or more  JH-HLM Achieved: 7: Walk 25 feet or more  JH-HLM Goal NOT achieved. Continue with multidisciplinary rounding and encourage appropriate mobility to improve upon JH-HLM goals.    Patient Centered Rounds: I performed bedside rounds with nursing staff today.   Discussions with Specialists or Other Care Team Provider: cardiology, nephrology    Education and Discussions with Family / Patient: Patient declined call to .     Current Length of Stay: 17 day(s)  Current Patient Status: Inpatient   Discharge Plan: Anticipate discharge in 48-72 hrs to home.    Code Status: Level 1 - Full Code    Subjective:  Patient was seen and evaluated at bedside.  No overnight events.  He reports some soreness \"deep inside\" at left kidney biopsy site, but feels " pain is much better than before.  He also endorses some neck pain that he attributes to positioning during sleep. Patient states he slept well last night.  Denies chest pain, shortness of breath, nausea, vomiting, any other complaints at this time.    Objective:     Vitals:   Temp (24hrs), Av.4 °F (36.3 °C), Min:97.2 °F (36.2 °C), Max:97.5 °F (36.4 °C)    Temp:  [97.2 °F (36.2 °C)-97.5 °F (36.4 °C)] 97.2 °F (36.2 °C)  HR:  [46-65] 58  Resp:  [16-20] 20  BP: (140-149)/(67-80) 148/79  SpO2:  [84 %-97 %] 94 %  Body mass index is 59.27 kg/m².     Input and Output Summary (last 24 hours):     Intake/Output Summary (Last 24 hours) at 2024 1414  Last data filed at 2024 1300  Gross per 24 hour   Intake 1880 ml   Output 900 ml   Net 980 ml       Physical Exam:   Physical Exam  Vitals and nursing note reviewed.   Constitutional:       General: He is not in acute distress.     Appearance: He is obese. He is not ill-appearing.   HENT:      Head: Normocephalic and atraumatic.      Right Ear: External ear normal.      Left Ear: External ear normal.      Nose: Nose normal.      Mouth/Throat:      Mouth: Mucous membranes are moist.      Pharynx: Oropharynx is clear.   Eyes:      Extraocular Movements: Extraocular movements intact.      Conjunctiva/sclera: Conjunctivae normal.   Cardiovascular:      Rate and Rhythm: Normal rate and regular rhythm.      Pulses: Normal pulses.      Heart sounds: Normal heart sounds. No murmur heard.     No gallop.   Pulmonary:      Effort: Pulmonary effort is normal. No respiratory distress.      Breath sounds: Normal breath sounds. No wheezing.   Abdominal:      General: Bowel sounds are normal. There is no distension.      Palpations: Abdomen is soft.      Tenderness: There is no abdominal tenderness.      Comments: Lymphedema noted to the left lower pannus  Skin under pannus appears irritated with some areas of maceration.  There is also a small draining wound present.    Musculoskeletal:         General: Normal range of motion.      Cervical back: Normal range of motion and neck supple. No rigidity.      Right lower leg: Edema (violaceous skin changes right lower extremity with healing punctate wounds over shin) present.      Left lower leg: Edema (lymphedema) present.      Comments: Bandage noted to left back.  Bandage not removed.  No surrounding swelling, discoloration, or signs of bleeding noted.  LLE significantly larger than RLE due to chronic lymphedema. Healed surgical incision noted to left calf.    Skin:     General: Skin is warm and dry.      Comments: R IJV PermCath site clear. Dressing intact.   Neurological:      General: No focal deficit present.      Mental Status: He is alert and oriented to person, place, and time. Mental status is at baseline.   Psychiatric:         Mood and Affect: Mood normal.          Additional Data:     Labs:  Results from last 7 days   Lab Units 07/20/24  1020   WBC Thousand/uL 6.24   HEMOGLOBIN g/dL 9.2*   HEMATOCRIT % 29.5*   PLATELETS Thousands/uL 189     Results from last 7 days   Lab Units 07/21/24  0907   SODIUM mmol/L 133*   POTASSIUM mmol/L 4.7   CHLORIDE mmol/L 96   CO2 mmol/L 29   BUN mg/dL 42*   CREATININE mg/dL 9.86*   ANION GAP mmol/L 8   CALCIUM mg/dL 9.1   GLUCOSE RANDOM mg/dL 103     Results from last 7 days   Lab Units 07/21/24  0907   INR  1.07       Results from last 7 days   Lab Units 07/21/24  1134 07/21/24  0855 07/20/24  2117 07/20/24  1536 07/20/24  1132 07/20/24  0739 07/19/24  2157 07/19/24  1626 07/19/24  1124 07/19/24  0704 07/18/24  2102 07/18/24  1503   POC GLUCOSE mg/dl 108 105 98 103 123 82 98 84 87 88 111 122               Lines/Drains:  Invasive Devices       Peripheral Intravenous Line  Duration             Peripheral IV 07/21/24 Right;Ventral (anterior) Forearm <1 day              Hemodialysis Catheter  Duration             HD Permanent Double Catheter 36 days                    Imaging: Reviewed  radiology reports from this admission including: chest xray, abdominal/pelvic CT, and ECHO      CT abdomen pelvis wo contrast   Final Result by Cam Rdz MD (07/20 0936)      Given limitations without IV contrast, no perinephric hematoma identified.      Workstation performed: [id number]         XR chest portable   Final Result by Danyell Ford MD (07/15 4255)      No acute cardiopulmonary disease.            Workstation performed: [id number]         IR biopsy kidney random   Final Result by Radames Liu MD (07/12 1733)   CT-guided random kidney biopsy canceled due to inability to optimize blood pressure versus respiratory status.      Plan:   Will need repeat attempt at CT-guided biopsy with anesthesiology assistance, likely general anesthesia.      Workstation performed: [id number]         CTA ED chest PE Study   Final Result by Gurjit Gibbons MD (07/04 2003)      Subsegmental right lower lobe pulmonary embolism.   No CT evidence of right heart strain.      Diffuse patchy groundglass opacities consistent with infectious/inflammatory infiltrate.      Findings were discussed with Dr. Monique Rothman MD 8:00 p.m. on 7/4/2024            Workstation performed: [id number]         XR chest 1 view portable   Final Result by Danyell Ford MD (07/04 1707)      Mild pulmonary venous congestion.            Workstation performed: [id number]         IR biopsy kidney random    (Results Pending)     Recent Cultures (last 7 days):         Last 24 Hours Medication List:   Current Facility-Administered Medications   Medication Dose Route Frequency Provider Last Rate    acetaminophen  975 mg Oral Q8H SHAZIA Martin DO      amLODIPine  10 mg Oral Daily Andrea Longoria MD      atorvastatin  80 mg Oral Daily Mily Martin MD      carvedilol  6.25 mg Oral BID With Meals Andrea Longoria MD      heparin (porcine)  3-30 Units/kg/hr (Order-Specific) Intravenous Titrated Ravindar  MD Brenda 26 Units/kg/hr (07/21/24 1008)    heparin (porcine)  10,000 Units Intravenous Q6H PRN Job Gutierrez MD      heparin (porcine)  5,000 Units Intravenous Q6H PRN Job Gutierrez MD      hydrALAZINE  25 mg Oral Q8H [facility] Kriss Velazco MD      HYDROmorphone  0.5 mg Intravenous Q3H PRN Cary Wilkinson MD      iron sucrose  100 mg Intravenous After Dialysis Johnny Viramontes MD 0 mg (07/15/24 1311)    isosorbide mononitrate  60 mg Oral Daily Mily Martin MD      lidocaine  3 patch Topical Daily Cary Wilkinson MD      melatonin  3 mg Oral HS Cary Wilkinson MD      naloxone  0.04 mg Intravenous Q1MIN PRN Cary Wilkinson MD      nystatin   Topical BID Lorri Maria MD      ondansetron  4 mg Intravenous Once Cindy Balderas MD      ondansetron  4 mg Intravenous Q8H PRN Cam Hills MD      oxyCODONE  10 mg Oral Q4H PRN Cary Wilkinson MD      oxyCODONE  5 mg Oral Q4H PRN Cary Wilkinson MD      polyethylene glycol  17 g Oral Daily PRN Diane Oliveira MD      sevelamer  800 mg Oral TID With Meals Mily Martin MD      torsemide  100 mg Oral Daily Charles Spear MD      warfarin  7.5 mg Oral Daily (warfarin) Lorri Maria MD          Today, Patient Was Seen By: Deya Palomino MD    **Please Note: This note may have been constructed using a voice recognition system.**

## 2024-07-21 NOTE — ASSESSMENT & PLAN NOTE
Hemoglobin   Date Value Ref Range Status   07/22/2024 9.2 (L) 12.0 - 17.0 g/dL Final   01/23/2017 14.5 12.6 - 17.7 g/dL Final     Hematocrit   Date Value Ref Range Status   07/22/2024 29.2 (L) 36.5 - 49.3 % Final   01/23/2017 43.8 37.5 - 51.0 % Final       Patient has history of anemia of chronic disease, ESRD  CBC on admission showed hgb 6.6 / hct 21.2  Received 2 units packed RBC's in ED. Hbg 7.6 after transfusion  Hematology following with outpatient f/u scheduled 7/11/2024, patient will reschedule due to overlapping hospital admission   On oral iron twice daily  No active bleeding, on heparin for PE, will switch to oral AC after renal biopsy procedure.  7/8/2024 Patient was transfused 1 unit of blood during hemodialysis. Hemoglobin 8.8, hematocrit 27.1  S/P 3 units packed RBCs  Start IV Venofer 100 mg with each dialysis treatment for total 10 doses starting 7/10/24   Hemoglobin stable

## 2024-07-21 NOTE — ASSESSMENT & PLAN NOTE
Patient presented for worsening chest pain, shortness of breath, and dyspnea on exertion.  Patient was admitted for similar chest pain in early June and was found to have ESRD, started on HD MWF   D-Dimer 4.21  CTA showed subsegmental right lower lobe pulmonary embolism  7/12/24 Heparin was subtherapeutic this morning, PTT 55. No bolus was given or increase in gtt as patient will have the gtt stopped at 10 am pending IR procedure.  Patient has periods of hypoxia in the 80s supplemental oxygen encouraged.    Plan:  Discharged on warfarin 10 mg daily.  Is to follow-up for repeat INR in 3 days 7/29. Goal INR 2-3

## 2024-07-22 ENCOUNTER — APPOINTMENT (INPATIENT)
Dept: DIALYSIS | Facility: HOSPITAL | Age: 44
DRG: 175 | End: 2024-07-22
Attending: INTERNAL MEDICINE
Payer: COMMERCIAL

## 2024-07-22 PROBLEM — D47.2 MGUS (MONOCLONAL GAMMOPATHY OF UNKNOWN SIGNIFICANCE): Status: ACTIVE | Noted: 2024-07-22

## 2024-07-22 LAB
ANION GAP SERPL CALCULATED.3IONS-SCNC: 10 MMOL/L (ref 4–13)
APTT PPP: 90 SECONDS (ref 23–37)
BUN SERPL-MCNC: 50 MG/DL (ref 5–25)
CALCIUM SERPL-MCNC: 9.2 MG/DL (ref 8.4–10.2)
CHLORIDE SERPL-SCNC: 96 MMOL/L (ref 96–108)
CO2 SERPL-SCNC: 27 MMOL/L (ref 21–32)
CREAT SERPL-MCNC: 10.9 MG/DL (ref 0.6–1.3)
ERYTHROCYTE [DISTWIDTH] IN BLOOD BY AUTOMATED COUNT: 13.6 % (ref 11.6–15.1)
GFR SERPL CREATININE-BSD FRML MDRD: 5 ML/MIN/1.73SQ M
GLUCOSE SERPL-MCNC: 119 MG/DL (ref 65–140)
GLUCOSE SERPL-MCNC: 128 MG/DL (ref 65–140)
GLUCOSE SERPL-MCNC: 78 MG/DL (ref 65–140)
GLUCOSE SERPL-MCNC: 81 MG/DL (ref 65–140)
GLUCOSE SERPL-MCNC: 99 MG/DL (ref 65–140)
HCT VFR BLD AUTO: 29.2 % (ref 36.5–49.3)
HGB BLD-MCNC: 9.2 G/DL (ref 12–17)
INR PPP: 1.06 (ref 0.84–1.19)
MCH RBC QN AUTO: 29.5 PG (ref 26.8–34.3)
MCHC RBC AUTO-ENTMCNC: 31.5 G/DL (ref 31.4–37.4)
MCV RBC AUTO: 94 FL (ref 82–98)
PLATELET # BLD AUTO: 184 THOUSANDS/UL (ref 149–390)
PMV BLD AUTO: 9.7 FL (ref 8.9–12.7)
POTASSIUM SERPL-SCNC: 5.1 MMOL/L (ref 3.5–5.3)
PROTHROMBIN TIME: 14.5 SECONDS (ref 11.6–14.5)
RBC # BLD AUTO: 3.12 MILLION/UL (ref 3.88–5.62)
SODIUM SERPL-SCNC: 133 MMOL/L (ref 135–147)
WBC # BLD AUTO: 5.69 THOUSAND/UL (ref 4.31–10.16)

## 2024-07-22 PROCEDURE — 80048 BASIC METABOLIC PNL TOTAL CA: CPT

## 2024-07-22 PROCEDURE — 90935 HEMODIALYSIS ONE EVALUATION: CPT | Performed by: STUDENT IN AN ORGANIZED HEALTH CARE EDUCATION/TRAINING PROGRAM

## 2024-07-22 PROCEDURE — 85610 PROTHROMBIN TIME: CPT

## 2024-07-22 PROCEDURE — 85730 THROMBOPLASTIN TIME PARTIAL: CPT | Performed by: INTERNAL MEDICINE

## 2024-07-22 PROCEDURE — 82948 REAGENT STRIP/BLOOD GLUCOSE: CPT

## 2024-07-22 PROCEDURE — 99232 SBSQ HOSP IP/OBS MODERATE 35: CPT | Performed by: INTERNAL MEDICINE

## 2024-07-22 PROCEDURE — 85027 COMPLETE CBC AUTOMATED: CPT | Performed by: INTERNAL MEDICINE

## 2024-07-22 RX ORDER — NIFEDIPINE 30 MG/1
30 TABLET, EXTENDED RELEASE ORAL DAILY
Status: DISCONTINUED | OUTPATIENT
Start: 2024-07-23 | End: 2024-07-26 | Stop reason: HOSPADM

## 2024-07-22 RX ORDER — WARFARIN SODIUM 5 MG/1
10 TABLET ORAL
Status: DISCONTINUED | OUTPATIENT
Start: 2024-07-22 | End: 2024-07-23

## 2024-07-22 RX ORDER — NIFEDIPINE 30 MG/1
30 TABLET, EXTENDED RELEASE ORAL DAILY
Status: DISCONTINUED | OUTPATIENT
Start: 2024-07-22 | End: 2024-07-22

## 2024-07-22 RX ADMIN — HYDROMORPHONE HYDROCHLORIDE 0.5 MG: 1 INJECTION, SOLUTION INTRAMUSCULAR; INTRAVENOUS; SUBCUTANEOUS at 23:08

## 2024-07-22 RX ADMIN — NYSTATIN: 100000 POWDER TOPICAL at 09:30

## 2024-07-22 RX ADMIN — SEVELAMER HYDROCHLORIDE 800 MG: 800 TABLET ORAL at 17:31

## 2024-07-22 RX ADMIN — OXYCODONE HYDROCHLORIDE 10 MG: 10 TABLET ORAL at 13:03

## 2024-07-22 RX ADMIN — ISOSORBIDE MONONITRATE 60 MG: 60 TABLET, EXTENDED RELEASE ORAL at 13:03

## 2024-07-22 RX ADMIN — WARFARIN SODIUM 10 MG: 5 TABLET ORAL at 17:32

## 2024-07-22 RX ADMIN — HEPARIN SODIUM 26 UNITS/KG/HR: 10000 INJECTION, SOLUTION INTRAVENOUS at 12:53

## 2024-07-22 RX ADMIN — Medication 3 MG: at 21:25

## 2024-07-22 RX ADMIN — IRON SUCROSE 100 MG: 20 INJECTION, SOLUTION INTRAVENOUS at 10:45

## 2024-07-22 RX ADMIN — OXYCODONE HYDROCHLORIDE 10 MG: 10 TABLET ORAL at 05:06

## 2024-07-22 RX ADMIN — ACETAMINOPHEN 975 MG: 325 TABLET, FILM COATED ORAL at 21:25

## 2024-07-22 RX ADMIN — HYDROMORPHONE HYDROCHLORIDE 0.5 MG: 1 INJECTION, SOLUTION INTRAMUSCULAR; INTRAVENOUS; SUBCUTANEOUS at 06:40

## 2024-07-22 RX ADMIN — HYDROMORPHONE HYDROCHLORIDE 0.5 MG: 1 INJECTION, SOLUTION INTRAMUSCULAR; INTRAVENOUS; SUBCUTANEOUS at 01:12

## 2024-07-22 RX ADMIN — OXYCODONE HYDROCHLORIDE 10 MG: 10 TABLET ORAL at 17:31

## 2024-07-22 RX ADMIN — ACETAMINOPHEN 975 MG: 325 TABLET, FILM COATED ORAL at 15:07

## 2024-07-22 RX ADMIN — HYDRALAZINE HYDROCHLORIDE 25 MG: 25 TABLET ORAL at 05:02

## 2024-07-22 RX ADMIN — NYSTATIN: 100000 POWDER TOPICAL at 17:34

## 2024-07-22 RX ADMIN — AMLODIPINE BESYLATE 10 MG: 10 TABLET ORAL at 13:03

## 2024-07-22 RX ADMIN — ACETAMINOPHEN 975 MG: 325 TABLET, FILM COATED ORAL at 05:02

## 2024-07-22 RX ADMIN — SEVELAMER HYDROCHLORIDE 800 MG: 800 TABLET ORAL at 12:57

## 2024-07-22 RX ADMIN — ONDANSETRON 4 MG: 2 INJECTION INTRAMUSCULAR; INTRAVENOUS at 21:30

## 2024-07-22 RX ADMIN — CARVEDILOL 6.25 MG: 6.25 TABLET, FILM COATED ORAL at 17:32

## 2024-07-22 RX ADMIN — HYDROMORPHONE HYDROCHLORIDE 0.5 MG: 1 INJECTION, SOLUTION INTRAMUSCULAR; INTRAVENOUS; SUBCUTANEOUS at 15:07

## 2024-07-22 RX ADMIN — OXYCODONE HYDROCHLORIDE 10 MG: 10 TABLET ORAL at 21:25

## 2024-07-22 RX ADMIN — TORSEMIDE 100 MG: 100 TABLET ORAL at 13:03

## 2024-07-22 RX ADMIN — HEPARIN SODIUM 26 UNITS/KG/HR: 10000 INJECTION, SOLUTION INTRAVENOUS at 21:25

## 2024-07-22 RX ADMIN — ATORVASTATIN CALCIUM 80 MG: 40 TABLET, FILM COATED ORAL at 12:57

## 2024-07-22 RX ADMIN — HEPARIN SODIUM 26 UNITS/KG/HR: 10000 INJECTION, SOLUTION INTRAVENOUS at 05:51

## 2024-07-22 NOTE — PROGRESS NOTES
[facility]  Progress Note  Name: Joaquin Frankel I  MRN: [id number]  Unit/Bed#: S -01 I Date of Admission: 7/4/2024   Date of Service: 7/22/2024 I Hospital Day: 18    Assessment & Plan   ESRD (end stage renal disease) on dialysis (HCC)  Assessment & Plan  Lab Results   Component Value Date    EGFR 5 07/21/2024    EGFR 6 07/20/2024    EGFR 6 07/19/2024    CREATININE 9.86 (H) 07/21/2024    CREATININE 8.65 (H) 07/20/2024    CREATININE 9.40 (H) 07/19/2024     Patient ESRD on Dialysis 3 times a week, MWF  Elevated , c/o of orthopnea   Etiology of CLOVIS: Etiology not entirely clear - Possible MGRS? Pattern of disease progression is not typical for diabetic nephropathy.   Etiology of CKD: Suspected due to diabetic nephropathy, hypertensive nephrosclerosis, obesity related renal dysfunction  Previous baseline creatinine 1.3-1.6 w/ prior episodes of CLOVIS  Serologic workup negative except for IgG kappa on SPEP and UPEP.  Cryoglobulin negative, ANCA negative, anti-dsDNA negative, anti-GBM negative, JOSE ROBERTO negative, C4 34, C3 118  Bone marrow biopsy 6/12/2024: negative for plasma cell neoplasm.   CRRT initiated 6/7/2024. Peak creatinine 10.4  7/12 renal biopsy was not performed due to hypertension despite sedation and antihypertensive medications. Rescheduled for Monday 7/15/24  7/15 renal biopsy was not completed due to oxygen saturation of 76-89% during dialysis.  Likely due to ANGY/OHS.  Agreed to wear oxygen via nasal cannula.  7/19 L renal biopsy performed.  Results pending    Plan:  Continue HD per nephrology  On torsemide 100 mg daily  Monitor I/O, Daily weight  Trend BMP and electrolytes      * Pulmonary embolism (HCC)  Assessment & Plan  Patient presented for worsening chest pain, shortness of breath, and dyspnea on exertion.  Patient was admitted for similar chest pain in early June and was found to have ESRD, started on HD MWF   D-Dimer 4.21  CTA showed subsegmental right lower  lobe pulmonary embolism  7/12/24 Heparin was subtherapeutic this morning, PTT 55. No bolus was given or increase in gtt as patient will have the gtt stopped at 10 am pending IR procedure.  Saturating well     Plan:  On IV heparing, bridging to warfarin. Increase to warfarin 10 mg tonight.  Goal INR 2-3  Monitor respiratory status, maintain o2 sat above 90% with supplementary O2 as needed    Hypertension  Assessment & Plan  History of HTN, recent medication change after dx of ESRD  Workup at that time notable for IgG kappa monoclonal gammopathy and s/p bone marrow biopsy on 6/12/2023 to assess for multiple myeloma. Bone marrow biopsy with no evidence of plasma cell neoplasm.   HD on MWF schedule.   Presented to ED with uncontrolled /80. Endorsed headaches and blurry vision in his left eye in the ED that has since resolved.      Plan:  Continue hydralazine 25mg q8hrs,amlodipine 10 mg daily, carvedilol 6.25 mg twice daily, Imdur 60 mg once daily, torsemide 100 mg daily  Hold carvedilol if HR <50  Nephrology onbooard   Continue UF with dialysis as BP tolerates     Chest pain  Assessment & Plan  Patient presented with worsening substernal chest pain over several weeks.  H/o chronic angina, CAD, family history of hereditary heart disease on fathers side.   Patient was admitted for similar chest pain in early June and was found to have ESRD, started on HD MWF.  Recent nuclear stress test without significant reversible ischemia  EKG on admission: Sinus Rhythm with PAC's. Repeat EKG showed sinus bradycardia  High-sensitivity troponin negative x 5   . D-Dimer 4.21  CTA: subsegmental right lower lobe pulmonary embolism  07/05/24: Updated echocardiogram on unremarkable  CP Multifactorial from acute on chronic anemia, hypertensive urgency, and acute PE   Patient's chest pain has resolved following hemodialysis and the improvement of high blood pressure.   7/9: Telemetry discontinued    Plan-  Per cardiology recs:  Continue amlodipine 5 mg daily,Coreg 6.25 mg BID, Imdur 60 mg daily, atorvastatin 80 mg daily.     Type 2 diabetes mellitus (HCC)  Assessment & Plan  Lab Results   Component Value Date    HGBA1C 5.7 (H) 06/09/2024       Recent Labs     07/21/24  1614 07/21/24  2132 07/22/24  0756 07/22/24  1051   POCGLU 86 116 78 119       Blood Sugar Average: Last 72 hrs:  (P) [id number]    SSI  Hypoglycemia protocol      Anemia  Assessment & Plan    Hemoglobin   Date Value Ref Range Status   07/22/2024 9.2 (L) 12.0 - 17.0 g/dL Final   01/23/2017 14.5 12.6 - 17.7 g/dL Final     Hematocrit   Date Value Ref Range Status   07/22/2024 29.2 (L) 36.5 - 49.3 % Final   01/23/2017 43.8 37.5 - 51.0 % Final       Patient has history of anemia of chronic disease, ESRD  CBC on admission showed hgb 6.6 / hct 21.2  Received 2 units packed RBC's in ED. Hbg 7.6 after transfusion  Hematology following with outpatient f/u scheduled 7/11/2024, patient will reschedule due to overlapping hospital admission   On oral iron twice daily  No active bleeding, on heparin for PE, will switch to oral AC after renal biopsy procedure.  7/8/2024 Patient was transfused 1 unit of blood during hemodialysis. Hemoglobin 8.8, hematocrit 27.1  S/P 3 units packed RBCs  Start IV Venofer 100 mg with each dialysis treatment for total 10 doses starting 7/10/24     Plan:  Hemoglobin stable  Transfuse for Hgb <8.0    Hyponatremia  Assessment & Plan  Lab Results   Component Value Date/Time    SODIUM 133 (L) 07/22/2024 05:11 AM     Likely due to fluid overload.     Plan:  Per nephro, fluid restriction to 1.5 L/day  Continue with hemodialysis  F/u BMP in AM    ANGY (obstructive sleep apnea)  Assessment & Plan  Overnight pulse oximetry obtained: highest O2 sat 99%, his lowest O2 sat 73%, with a mean of 91%. Patient had 120 desaturation events <89%.  He spent an 1 hour and 15 minutes in desaturation (~15.82%) and and 1 hour and 34 minutes with desaturation <89%  (~19.77%).  Counseled patient on importance of maintaining oxygen saturation as oxygen deprivation can lead to hypertension, heart failure, coronary artery disease, pulmonary hypertension, etc.    Patient was advised of need to wear oxygen at night upon discharge. He would like to wait for formal evaluation via sleep study as an outpatient, but agrees to wear supplemental oxygen currently.    Chronic acquired lymphedema  Assessment & Plan  Chronic LLE lymphedema x 3 years after surgical resection of LLE cyst    Plan:  Advised compression and elevation of lower extremity for symptomatic edema relief    Acute respiratory failure with hypoxia (HCC)-resolved as of 7/6/2024  Assessment & Plan  Patient O2 saturation on admission was 83% on RA  Was placed on 3/L Nasal canula FiO2 32%  CTA showed right lower lobe subsegmental PE  Was placed on heparin    Plan:  See PE  Continue supplementary oxygen to maintain o2 sat >90%  Incentive Spirometry           VTE Pharmacologic Prophylaxis: VTE Score: 7 High Risk (Score >/= 5) - Pharmacological DVT Prophylaxis Ordered: heparin drip. Sequential Compression Devices Ordered.    Mobility:   Basic Mobility Inpatient Raw Score: 24  JH-HLM Goal: 8: Walk 250 feet or more  JH-HLM Achieved: 6: Walk 10 steps or more  JH-HLM Goal NOT achieved. Continue with multidisciplinary rounding and encourage appropriate mobility to improve upon JH-HLM goals.    Patient Centered Rounds: I performed bedside rounds with nursing staff today.   Discussions with Specialists or Other Care Team Provider: cardiology, nephrology    Education and Discussions with Family / Patient: Patient declined call to .     Current Length of Stay: 18 day(s)  Current Patient Status: Inpatient   Discharge Plan: Anticipate discharge in 48-72 hrs to home.    Code Status: Level 1 - Full Code    Subjective:  Patient was seen and evaluated at bedside.  No overnight events.  He endorses continued pain at left kidney  biopsy site, but states it has improved. Patient slept well last night. Had normal BM this AM. Denies chest pain, shortness of breath, nausea, vomiting, neck pain, any other complaints at this time. Patient was hypoxic yesterday. He states he did not wear his supplemental oxygen. Informed him of subtherapeutic INR, and current plan to stay until level is therapeutic.  He understands and agrees.    Objective:     Vitals:   Temp (24hrs), Av.7 °F (36.5 °C), Min:97.3 °F (36.3 °C), Max:98.1 °F (36.7 °C)    Temp:  [97.3 °F (36.3 °C)-98.1 °F (36.7 °C)] 98 °F (36.7 °C)  HR:  [51-72] 59  Resp:  [17-19] 18  BP: (127-170)/(60-93) 152/74  SpO2:  [83 %-97 %] 93 %  Body mass index is 59.39 kg/m².     Input and Output Summary (last 24 hours):     Intake/Output Summary (Last 24 hours) at 2024 1341  Last data filed at 2024 1258  Gross per 24 hour   Intake 800 ml   Output 4600 ml   Net -3800 ml       Physical Exam:   Physical Exam  Vitals and nursing note reviewed.   Constitutional:       General: He is not in acute distress.     Appearance: He is obese. He is not ill-appearing.   HENT:      Head: Normocephalic and atraumatic.      Right Ear: External ear normal.      Left Ear: External ear normal.      Nose: Nose normal.      Mouth/Throat:      Mouth: Mucous membranes are moist.      Pharynx: Oropharynx is clear.   Eyes:      Extraocular Movements: Extraocular movements intact.      Conjunctiva/sclera: Conjunctivae normal.   Cardiovascular:      Rate and Rhythm: Normal rate and regular rhythm.      Pulses: Normal pulses.      Heart sounds: Normal heart sounds. No murmur heard.     No gallop.   Pulmonary:      Effort: Pulmonary effort is normal. No respiratory distress.      Breath sounds: Normal breath sounds. No wheezing.   Abdominal:      General: Bowel sounds are normal. There is no distension.      Palpations: Abdomen is soft.      Tenderness: There is no abdominal tenderness.      Comments: Lymphedema noted to the  left lower pannus  Skin under pannus appears irritated with some areas of maceration.  There is also a small draining wound present.   Musculoskeletal:         General: Normal range of motion.      Cervical back: Normal range of motion and neck supple. No rigidity.      Right lower leg: Edema (violaceous skin changes right lower extremity with healing punctate wounds over shin) present.      Left lower leg: Edema (lymphedema) present.      Comments: Bandage noted to left back.  Bandage not removed.  No surrounding swelling, discoloration, or signs of bleeding noted.  LLE significantly larger than RLE due to chronic lymphedema. Healed surgical incision noted to left calf.    Skin:     General: Skin is warm and dry.      Comments: R IJV PermCath site clear. Dressing intact.   Neurological:      General: No focal deficit present.      Mental Status: He is alert and oriented to person, place, and time. Mental status is at baseline.   Psychiatric:         Mood and Affect: Mood normal.          Additional Data:     Labs:  Results from last 7 days   Lab Units 07/22/24  0511   WBC Thousand/uL 5.69   HEMOGLOBIN g/dL 9.2*   HEMATOCRIT % 29.2*   PLATELETS Thousands/uL 184     Results from last 7 days   Lab Units 07/22/24  0511   SODIUM mmol/L 133*   POTASSIUM mmol/L 5.1   CHLORIDE mmol/L 96   CO2 mmol/L 27   BUN mg/dL 50*   CREATININE mg/dL 10.90*   ANION GAP mmol/L 10   CALCIUM mg/dL 9.2   GLUCOSE RANDOM mg/dL 81     Results from last 7 days   Lab Units 07/22/24  0511   INR  1.06       Results from last 7 days   Lab Units 07/22/24  1051 07/22/24  0756 07/21/24  2132 07/21/24  1614 07/21/24  1134 07/21/24  0855 07/20/24  2117 07/20/24  1536 07/20/24  1132 07/20/24  0739 07/19/24  2157 07/19/24  1626   POC GLUCOSE mg/dl 119 78 116 86 108 105 98 103 123 82 98 84               Lines/Drains:  Invasive Devices       Peripheral Intravenous Line  Duration             Peripheral IV 07/21/24 Right;Ventral (anterior) Forearm 1 day               Hemodialysis Catheter  Duration             HD Permanent Double Catheter 37 days                    Imaging: Reviewed radiology reports from this admission including: chest xray, abdominal/pelvic CT, and ECHO      CT abdomen pelvis wo contrast   Final Result by Cam Rdz MD (07/20 0936)      Given limitations without IV contrast, no perinephric hematoma identified.      Workstation performed: [id number]         IR biopsy kidney random   Final Result by Stuart Nation MD (07/22 0808)   Impression: Successful percutaneous 18-gauge core biopsy of the left kidney yielding a specimen adequate for Pound kit evaluation. A full pathology report will follow.                  Workstation performed: [id number]         XR chest portable   Final Result by Danyell Ford MD (07/15 1655)      No acute cardiopulmonary disease.            Workstation performed: [id number]         IR biopsy kidney random   Final Result by Radames Liu MD (07/12 1733)   CT-guided random kidney biopsy canceled due to inability to optimize blood pressure versus respiratory status.      Plan:   Will need repeat attempt at CT-guided biopsy with anesthesiology assistance, likely general anesthesia.      Workstation performed: [id number]         CTA ED chest PE Study   Final Result by Gurjit Gibbons MD (07/04 2003)      Subsegmental right lower lobe pulmonary embolism.   No CT evidence of right heart strain.      Diffuse patchy groundglass opacities consistent with infectious/inflammatory infiltrate.      Findings were discussed with Dr. Monique Rothman MD 8:00 p.m. on 7/4/2024            Workstation performed: [id number]         XR chest 1 view portable   Final Result by Danyell Ford MD (07/04 1707)      Mild pulmonary venous congestion.            Workstation performed: [id number]           Recent Cultures (last 7 days):         Last 24 Hours Medication List:   Current Facility-Administered  Medications   Medication Dose Route Frequency Provider Last Rate    acetaminophen  975 mg Oral Q8H [facility] Marsha Martin DO      amLODIPine  10 mg Oral Daily Andrea Longoria MD      atorvastatin  80 mg Oral Daily Mily Martin MD      carvedilol  6.25 mg Oral BID With Meals Andrea Longoria MD      heparin (porcine)  3-30 Units/kg/hr (Order-Specific) Intravenous Titrated Job Gutierrez MD 26 Units/kg/hr (07/22/24 1253)    heparin (porcine)  10,000 Units Intravenous Q6H PRN Job Gutierrez MD      heparin (porcine)  5,000 Units Intravenous Q6H PRN Job Gutierrez MD      hydrALAZINE  25 mg Oral Q8H [facility] Kriss Velazco MD      HYDROmorphone  0.5 mg Intravenous Q3H PRN Cary Wilkinson MD      iron sucrose  100 mg Intravenous After Dialysis Johnny Viramontes MD Stopped (07/22/24 1207)    isosorbide mononitrate  60 mg Oral Daily Mily Martin MD      lidocaine  3 patch Topical Daily Cary Wilkinson MD      melatonin  3 mg Oral HS Cary Wilkinson MD      naloxone  0.04 mg Intravenous Q1MIN PRN Cary Wilkinson MD      nystatin   Topical BID Lorri Maria MD      ondansetron  4 mg Intravenous Once Cindy Balderas MD      ondansetron  4 mg Intravenous Q8H PRN Cam Hills MD      oxyCODONE  10 mg Oral Q4H PRN Cary Wilkinson MD      oxyCODONE  5 mg Oral Q4H PRN Cary Wilkinson MD      polyethylene glycol  17 g Oral Daily PRN Diane Oliveira MD      sevelamer  800 mg Oral TID With Meals Mily Martin MD      torsemide  100 mg Oral Daily Charles Spear MD      warfarin  10 mg Oral Daily (warfarin) Lorri Maria MD          Today, Patient Was Seen By: Deya Palomino MD    **Please Note: This note may have been constructed using a voice recognition system.**

## 2024-07-22 NOTE — PLAN OF CARE
Problem: Potential for Falls  Goal: Patient will remain free of falls  Description: INTERVENTIONS:  - Educate patient/family on patient safety including physical limitations  - Instruct patient to call for assistance with activity   - Consult OT/PT to assist with strengthening/mobility   - Keep Call bell within reach  - Keep bed low and locked with side rails adjusted as appropriate  - Keep care items and personal belongings within reach  - Initiate and maintain comfort rounds  - Make Fall Risk Sign visible to staff  - Offer Toileting every 2 Hours, in advance of need  - Initiate/Maintain bed and chair alarm  - Obtain necessary fall risk management equipment:   - Apply yellow socks and bracelet for high fall risk patients  - Consider moving patient to room near nurses station  Outcome: Progressing     Problem: METABOLIC, FLUID AND ELECTROLYTES - ADULT  Goal: Electrolytes maintained within normal limits  Description: INTERVENTIONS:  - Monitor labs and assess patient for signs and symptoms of electrolyte imbalances  - Administer electrolyte replacement as ordered  - Monitor response to electrolyte replacements, including repeat lab results as appropriate  - Instruct patient on fluid and nutrition as appropriate  Outcome: Progressing  Goal: Fluid balance maintained  Description: INTERVENTIONS:  - Monitor labs   - Monitor I/O and WT  - Instruct patient on fluid and nutrition as appropriate  - Assess for signs & symptoms of volume excess or deficit  Outcome: Progressing     Problem: Prexisting or High Potential for Compromised Skin Integrity  Goal: Skin integrity is maintained or improved  Description: INTERVENTIONS:  - Identify patients at risk for skin breakdown  - Assess and monitor skin integrity  - Assess and monitor nutrition and hydration status  - Monitor labs   - Assess for incontinence   - Turn and reposition patient  - Assist with mobility/ambulation  - Relieve pressure over bony prominences  - Avoid  friction and shearing  - Provide appropriate hygiene as needed including keeping skin clean and dry  - Evaluate need for skin moisturizer/barrier cream  - Collaborate with interdisciplinary team   - Patient/family teaching  - Consider wound care consult   Outcome: Progressing     Problem: Nutrition/Hydration-ADULT  Goal: Nutrient/Hydration intake appropriate for improving, restoring or maintaining nutritional needs  Description: Monitor and assess patient's nutrition/hydration status for malnutrition. Collaborate with interdisciplinary team and initiate plan and interventions as ordered.  Monitor patient's weight and dietary intake as ordered or per policy. Utilize nutrition screening tool and intervene as necessary. Determine patient's food preferences and provide high-protein, high-caloric foods as appropriate.     INTERVENTIONS:  - Monitor oral intake, urinary output, labs, and treatment plans  - Assess nutrition and hydration status and recommend course of action  - Evaluate amount of meals eaten  - Assist patient with eating if necessary   - Allow adequate time for meals  - Recommend/ encourage appropriate diets, oral nutritional supplements, and vitamin/mineral supplements  - Order, calculate, and assess calorie counts as needed  - Recommend, monitor, and adjust tube feedings and TPN/PPN based on assessed needs  - Assess need for intravenous fluids  - Provide specific nutrition/hydration education as appropriate  - Include patient/family/caregiver in decisions related to nutrition  Outcome: Progressing     Problem: PAIN - ADULT  Goal: Verbalizes/displays adequate comfort level or baseline comfort level  Description: Interventions:  - Encourage patient to monitor pain and request assistance  - Assess pain using appropriate pain scale  - Administer analgesics based on type and severity of pain and evaluate response  - Implement non-pharmacological measures as appropriate and evaluate response  - Consider  cultural and social influences on pain and pain management  - Notify physician/advanced practitioner if interventions unsuccessful or patient reports new pain  Outcome: Progressing     Problem: INFECTION - ADULT  Goal: Absence or prevention of progression during hospitalization  Description: INTERVENTIONS:  - Assess and monitor for signs and symptoms of infection  - Monitor lab/diagnostic results  - Monitor all insertion sites, i.e. indwelling lines, tubes, and drains  - Monitor endotracheal if appropriate and nasal secretions for changes in amount and color  - [place] appropriate cooling/warming therapies per order  - Administer medications as ordered  - Instruct and encourage patient and family to use good hand hygiene technique  - Identify and instruct in appropriate isolation precautions for identified infection/condition  Outcome: Progressing  Goal: Absence of fever/infection during neutropenic period  Description: INTERVENTIONS:  - Monitor WBC    Outcome: Progressing     Problem: SAFETY ADULT  Goal: Patient will remain free of falls  Description: INTERVENTIONS:  - Educate patient/family on patient safety including physical limitations  - Instruct patient to call for assistance with activity   - Consult OT/PT to assist with strengthening/mobility   - Keep Call bell within reach  - Keep bed low and locked with side rails adjusted as appropriate  - Keep care items and personal belongings within reach  - Initiate and maintain comfort rounds  - Make Fall Risk Sign visible to staff  - Offer Toileting every 2 Hours, in advance of need  - Initiate/Maintain bed and chair alarm  - Obtain necessary fall risk management equipment:   - Apply yellow socks and bracelet for high fall risk patients  - Consider moving patient to room near nurses station  Outcome: Progressing  Goal: Maintain or return to baseline ADL function  Description: INTERVENTIONS:  -  Assess patient's ability to carry out ADLs; assess patient's baseline  for ADL function and identify physical deficits which impact ability to perform ADLs (bathing, care of mouth/teeth, toileting, grooming, dressing, etc.)  - Assess/evaluate cause of self-care deficits   - Assess range of motion  - Assess patient's mobility; develop plan if impaired  - Assess patient's need for assistive devices and provide as appropriate  - Encourage maximum independence but intervene and supervise when necessary  - Involve family in performance of ADLs  - Assess for home care needs following discharge   - Consider OT consult to assist with ADL evaluation and planning for discharge  - Provide patient education as appropriate  Outcome: Progressing  Goal: Maintains/Returns to pre admission functional level  Description: INTERVENTIONS:  - Perform AM-PAC 6 Click Basic Mobility/ Daily Activity assessment daily.  - Set and communicate daily mobility goal to care team and patient/family/caregiver.   - Collaborate with rehabilitation services on mobility goals if consulted  - Perform Range of Motion 3 times a day.  - Reposition patient every 2 hours.  - Dangle patient 3 times a day  - Stand patient 3 times a day  - Ambulate patient 3 times a day  - Out of bed to chair 3 times a day   - Out of bed for meals 3 times a day  - Out of bed for toileting  - Record patient progress and toleration of activity level   Outcome: Progressing     Problem: DISCHARGE PLANNING  Goal: Discharge to home or other facility with appropriate resources  Description: INTERVENTIONS:  - Identify barriers to discharge w/patient and caregiver  - Arrange for needed discharge resources and transportation as appropriate  - Identify discharge learning needs (meds, wound care, etc.)  - Arrange for interpretive services to assist at discharge as needed  - Refer to Case Management Department for coordinating discharge planning if the patient needs post-hospital services based on physician/advanced practitioner order or complex needs related to  functional status, cognitive ability, or social support system  Outcome: Progressing     Problem: Knowledge Deficit  Goal: Patient/family/caregiver demonstrates understanding of disease process, treatment plan, medications, and discharge instructions  Description: Complete learning assessment and assess knowledge base.  Interventions:  - Provide teaching at level of understanding  - Provide teaching via preferred learning methods  Outcome: Progressing

## 2024-07-22 NOTE — BRIEF OP NOTE (RAD/CATH)
INTERVENTIONAL RADIOLOGY PROCEDURE NOTE    Date: 7/22/2024    Procedure:   Procedure Summary       Date: 07/19/24 Room / Location: [facility] CAT Scan    Anesthesia Start: 1401 Anesthesia Stop: 1522    Procedure: IR BIOPSY KIDNEY RANDOM Diagnosis: (CLOVIS of uncertain etiology)    Scheduled Providers:  Responsible Provider: Prabhakar Foster MD    Anesthesia Type: general ASA Status: 3            Preoperative diagnosis:   1. Anginal chest pain at rest    2. Hypoxia    3. Pulmonary embolism (HCC)    4. Acute on chronic diastolic congestive heart failure (HCC)    5. ESRD on dialysis (HCC)    6. Chest pain, unspecified type         Postoperative diagnosis: Same.    Surgeon: Stuart Nation MD     Assistant: None. No qualified resident was available.    Blood loss: None    Specimens: Multiple renal core biopsies     Findings: CT-guided renal cortex core biopsy of the left kidney.    Complications: None immediate.    Anesthesia: general

## 2024-07-22 NOTE — PLAN OF CARE
Patient presents for a 4 hour HD session on a 2K2.5Ca for a serum potassium of 5.1 mmol/L drawn today with a net UF goal of 3 L as tolerated.    Post-Dialysis RN Treatment Note    Blood Pressure:  Pre 134/60 mm/Hg  Post 152/74 mmHg   EDW  172.5 kg    Weight:  Pre 172.0 kg   Post 169.0 kg   Mode of weight measurement: Standing Scale   Volume Removed  3000 ml    Treatment duration 210 minutes    NS given  No    Treatment shortened? Yes, describe: Order updated to 3.5 hours per Dr. Reyes   Medications given during Rx 100 mg Venofer   Estimated Kt/V  0.64   Access type: Permacath/TDC   Access Issues: No    Report called to primary nurse   Yes, EDUARDA Sagastume     Problem: METABOLIC, FLUID AND ELECTROLYTES - ADULT  Goal: Electrolytes maintained within normal limits  Description: INTERVENTIONS:  - Monitor labs and assess patient for signs and symptoms of electrolyte imbalances  - Administer electrolyte replacement as ordered  - Monitor response to electrolyte replacements, including repeat lab results as appropriate  - Instruct patient on fluid and nutrition as appropriate  Outcome: Progressing  Goal: Fluid balance maintained  Description: INTERVENTIONS:  - Monitor labs   - Monitor I/O and WT  - Instruct patient on fluid and nutrition as appropriate  - Assess for signs & symptoms of volume excess or deficit  Outcome: Progressing

## 2024-07-22 NOTE — PROGRESS NOTES
NEPHROLOGY PROGRESS NOTE   Joaquin Frankel 44 y.o. male MRN: [id number]  Unit/Bed#: S -01 Encounter: [id number]  Reason for Consult: CLOVIS HD dependent     ASSESSMENT AND PLAN:  43 yo man with PMH of morbid obesity, lower extremity lymphedema, diabetes, hypertension, CAD, monoclonal gammopathy  p/w shortness of breath. Nephrology is consulted for CLOVIS on HD       PLAN:    #Non-Oliguric KDIGO CLOVIS stage 3 HD dependent  MWF  Etiology:uknown etiology possibel MGRS waiting for biopsy results   On HD today   Still looking for  evidence of kidney recovery   UACr 5288mg/g  HD today, well tolerated    Treatment:  Will continue HD MWF   Maintain MAP:  Over 65 mmHg if possible/avoid hypoperfusion:  Hold parameters on blood pressure medications  Avoid nephrotoxic agents such as NSAIDs, and IV contrast if possible. Avoid opioids   Adjust medications to GFR    #Acid-base Disorder   serum HCO3 27mmol/L  At goal     #Volume status/hypertension:  Volume: Hypervolemichypervolemic   Blood pressure:hypertensive /74mmhg, goal <140/90  Recommend:  Low sdoium diet  Stop amlodipine , start nifedipine 60 mg  Discontinue hydralazine  Monitor urinary output  Continue torsemide    #Anemia:  Current hemoglobin:9.2  Secondary to kidney disease and monoclonal gammopathy  Treatment:  Transfuse for hemoglobin less than 7.0 per primary service        # Monoclonal gammopathy  Status post bone marrow  Management as per oncology as an outpatient      Pulmonary embolism  Anticoagulation as per primary team      SUBJECTIVE:  Patient seen and examined at bedside during dialysis.  Well-tolerated no shortness of breath, no chest pain.  Patient reported more urinary output in the last 24 hours.        OBJECTIVE:  Current Weight: Weight - Scale: (!) 172 kg (379 lb 3.2 oz)  Vitals:    07/22/24 1258   BP: 152/74   Pulse: 59   Resp: 18   Temp: 98 °F (36.7 °C)   SpO2: 93%       Intake/Output Summary (Last 24 hours) at 7/22/2024 1407  Last data filed at  7/22/2024 1258  Gross per 24 hour   Intake 800 ml   Output 4600 ml   Net -3800 ml     Wt Readings from Last 3 Encounters:   07/22/24 (!) 172 kg (379 lb 3.2 oz)   06/26/24 (!) 182 kg (400 lb 9.2 oz)   05/30/24 (!) 187 kg (413 lb)     Temp Readings from Last 3 Encounters:   07/22/24 98 °F (36.7 °C) (Oral)   06/26/24 97.9 °F (36.6 °C)   05/30/24 (!) 97 °F (36.1 °C) (Core)     BP Readings from Last 3 Encounters:   07/22/24 152/74   06/26/24 142/66   05/22/24 134/74     Pulse Readings from Last 3 Encounters:   07/22/24 59   06/26/24 67   05/22/24 76        General:  no acute distress at this time  Skin:  No acute rash  Eyes:  No scleral icterus and noninjected  ENT:  mucous membranes moist  Neck:  no carotid bruits  Chest:  Clear to auscultation percussion, good respiratory effort, no use of accessory respiratory muscles  CVS:  Regular rate and rhythm without rub   Abdomen:  soft and nontender   Extremities:lower extremity edema  Neuro:  No gross focality  Psych:  Alert , cooperative  Dialysis access: PermCath      Medications:    Current Facility-Administered Medications:     acetaminophen (TYLENOL) tablet 975 mg, 975 mg, Oral, Q8H SHAZIA, Marsha Martin DO, 975 mg at 07/22/24 0502    amLODIPine (NORVASC) tablet 10 mg, 10 mg, Oral, Daily, Andrea Longoria MD, 10 mg at 07/22/24 1303    atorvastatin (LIPITOR) tablet 80 mg, 80 mg, Oral, Daily, Mily Martin MD, 80 mg at 07/22/24 1257    carvedilol (COREG) tablet 6.25 mg, 6.25 mg, Oral, BID With Meals, Andrea Longoria MD, 6.25 mg at 07/21/24 1602    heparin (porcine) 25,000 units in 0.45% NaCl 250 mL infusion (premix), 3-30 Units/kg/hr (Order-Specific), Intravenous, Titrated, Job Gutierrez MD, Last Rate: 32.5 mL/hr at 07/22/24 1253, 26 Units/kg/hr at 07/22/24 1253    heparin (porcine) injection 10,000 Units, 10,000 Units, Intravenous, Q6H PRN, Job Gutierrez MD, 10,000 Units at 07/20/24 1725    heparin (porcine) injection 5,000 Units, 5,000 Units,  Intravenous, Q6H PRN, Job Gutierrez MD, 5,000 Units at 07/21/24 1006    hydrALAZINE (APRESOLINE) tablet 25 mg, 25 mg, Oral, Q8H SHAZIA, Kriss Velazco MD, 25 mg at 07/22/24 0502    HYDROmorphone (DILAUDID) injection 0.5 mg, 0.5 mg, Intravenous, Q3H PRN, Cary Wilkinson MD, 0.5 mg at 07/22/24 0640    iron sucrose (VENOFER) 100 mg in sodium chloride 0.9 % 100 mL IVPB, 100 mg, Intravenous, After Dialysis, Johnny Viramontes MD, Stopped at 07/22/24 1207    isosorbide mononitrate (IMDUR) 24 hr tablet 60 mg, 60 mg, Oral, Daily, Mily Martin MD, 60 mg at 07/22/24 1303    lidocaine (LIDODERM) 5 % patch 3 patch, 3 patch, Topical, Daily, Cary Wilkinson MD    melatonin tablet 3 mg, 3 mg, Oral, HS, Cary Wilkinson MD    naloxone (NARCAN) 0.04 mg/mL syringe 0.04 mg, 0.04 mg, Intravenous, Q1MIN PRN, Cary Wilkinson MD    nystatin (MYCOSTATIN) powder, , Topical, BID, Lorri Maria MD, Given at 07/22/24 0930    ondansetron (ZOFRAN) injection 4 mg, 4 mg, Intravenous, Once, Cindy Balderas MD    ondansetron (ZOFRAN) injection 4 mg, 4 mg, Intravenous, Q8H PRN, Cam Hills MD, 4 mg at 07/20/24 1246    oxyCODONE (ROXICODONE) immediate release tablet 10 mg, 10 mg, Oral, Q4H PRN, Cary Wilkinson MD, 10 mg at 07/22/24 1303    oxyCODONE (ROXICODONE) IR tablet 5 mg, 5 mg, Oral, Q4H PRN, Cary Wilkinson MD    polyethylene glycol (MIRALAX) packet 17 g, 17 g, Oral, Daily PRN, Diane Oliveira MD    sevelamer (RENAGEL) tablet 800 mg, 800 mg, Oral, TID With Meals, Bilal Baalbaki, MD, 800 mg at 07/22/24 1257    torsemide (DEMADEX) tablet 100 mg, 100 mg, Oral, Daily, Charles Spear MD, 100 mg at 07/22/24 1303    warfarin (COUMADIN) tablet 10 mg, 10 mg, Oral, Daily (warfarin), Lorri Maria MD    Laboratory Results:  Results from last 7 days   Lab Units 07/22/24  0511 07/21/24  0907 07/20/24  1020 07/20/24  0538 07/20/24  0536 07/19/24  2020 07/19/24  0613 07/18/24  0535 07/17/24  0735 07/16/24  0549   WBC Thousand/uL 5.69  --  6.24  --  6.23 6.71 6.12 5.56 5.69 5.84    HEMOGLOBIN g/dL 9.2*  --  9.2*  --  9.2* 8.9* 9.3* 8.9* 9.1* 8.7*   HEMATOCRIT % 29.2*  --  29.5*  --  29.7* 28.6* 29.7* 28.6* 29.0* 27.3*   PLATELETS Thousands/uL 184  --  189  --  185 174 189 182 201 175   SODIUM mmol/L 133* 133*  --  133*  --   --  131* 130* 131* 131*   POTASSIUM mmol/L 5.1 4.7  --  4.9  --   --  5.0 4.2 4.4 4.3   CHLORIDE mmol/L 96 96  --  96  --   --  96 95* 93* 95*   CO2 mmol/L 27 29  --  30  --   --  27 29 30 30   BUN mg/dL 50* 42*  --  33*  --   --  41* 31* 40* 32*   CREATININE mg/dL 10.90* 9.86*  --  8.65*  --   --  9.40* 7.66* 9.55* 8.00*   CALCIUM mg/dL 9.2 9.1  --  9.2  --   --  9.3 8.7 9.1 8.8   MAGNESIUM mg/dL  --   --   --  2.0  --   --  2.0 1.9 2.1 1.8*   PHOSPHORUS mg/dL  --   --   --  5.2*  --   --  5.1* 4.5 5.4* 4.3       CT abdomen pelvis wo contrast   Final Result by Cam Rdz MD (07/20 0936)      Given limitations without IV contrast, no perinephric hematoma identified.      Workstation performed: [id number]         IR biopsy kidney random   Final Result by Stuart Nation MD (07/22 0808)   Impression: Successful percutaneous 18-gauge core biopsy of the left kidney yielding a specimen adequate for [place] kit evaluation. A full pathology report will follow.                  Workstation performed: [id number]         XR chest portable   Final Result by Danyell Ford MD (07/15 1655)      No acute cardiopulmonary disease.            Workstation performed: [id number]         IR biopsy kidney random   Final Result by Radames Liu MD (07/12 1733)   CT-guided random kidney biopsy canceled due to inability to optimize blood pressure versus respiratory status.      Plan:   Will need repeat attempt at CT-guided biopsy with anesthesiology assistance, likely general anesthesia.      Workstation performed: [id number]         CTA ED chest PE Study   Final Result by Gurjit Gibbons MD (07/04 2003)      Subsegmental right lower lobe pulmonary  "embolism.   No CT evidence of right heart strain.      Diffuse patchy groundglass opacities consistent with infectious/inflammatory infiltrate.      Findings were discussed with Dr. Monique Rothman MD 8:00 p.m. on 7/4/2024            Workstation performed: [id number]         XR chest 1 view portable   Final Result by Danyell Ford MD (07/04 1707)      Mild pulmonary venous congestion.            Workstation performed: [id number]             Portions of the record may have been created with voice recognition software. Occasional wrong word or \"sound a like\" substitutions may have occurred due to the inherent limitations of voice recognition software. Read the chart carefully and recognize, using context, where substitutions have occurred.    "

## 2024-07-23 LAB
ANION GAP SERPL CALCULATED.3IONS-SCNC: 7 MMOL/L (ref 4–13)
APTT PPP: 107 SECONDS (ref 23–37)
APTT PPP: 40 SECONDS (ref 23–37)
BUN SERPL-MCNC: 32 MG/DL (ref 5–25)
CALCIUM SERPL-MCNC: 9.3 MG/DL (ref 8.4–10.2)
CHLORIDE SERPL-SCNC: 96 MMOL/L (ref 96–108)
CO2 SERPL-SCNC: 30 MMOL/L (ref 21–32)
CREAT SERPL-MCNC: 8.08 MG/DL (ref 0.6–1.3)
GFR SERPL CREATININE-BSD FRML MDRD: 7 ML/MIN/1.73SQ M
GLUCOSE SERPL-MCNC: 112 MG/DL (ref 65–140)
GLUCOSE SERPL-MCNC: 80 MG/DL (ref 65–140)
GLUCOSE SERPL-MCNC: 82 MG/DL (ref 65–140)
GLUCOSE SERPL-MCNC: 83 MG/DL (ref 65–140)
GLUCOSE SERPL-MCNC: 89 MG/DL (ref 65–140)
INR PPP: 1.07 (ref 0.84–1.19)
POTASSIUM SERPL-SCNC: 4.8 MMOL/L (ref 3.5–5.3)
PROTHROMBIN TIME: 14.5 SECONDS (ref 11.6–14.5)
SODIUM SERPL-SCNC: 133 MMOL/L (ref 135–147)

## 2024-07-23 PROCEDURE — 85730 THROMBOPLASTIN TIME PARTIAL: CPT | Performed by: INTERNAL MEDICINE

## 2024-07-23 PROCEDURE — 80048 BASIC METABOLIC PNL TOTAL CA: CPT

## 2024-07-23 PROCEDURE — 82948 REAGENT STRIP/BLOOD GLUCOSE: CPT

## 2024-07-23 PROCEDURE — 85610 PROTHROMBIN TIME: CPT

## 2024-07-23 PROCEDURE — 85730 THROMBOPLASTIN TIME PARTIAL: CPT | Performed by: HOSPITALIST

## 2024-07-23 PROCEDURE — 99232 SBSQ HOSP IP/OBS MODERATE 35: CPT | Performed by: HOSPITALIST

## 2024-07-23 PROCEDURE — 99232 SBSQ HOSP IP/OBS MODERATE 35: CPT | Performed by: STUDENT IN AN ORGANIZED HEALTH CARE EDUCATION/TRAINING PROGRAM

## 2024-07-23 RX ADMIN — OXYCODONE HYDROCHLORIDE 10 MG: 10 TABLET ORAL at 20:40

## 2024-07-23 RX ADMIN — NYSTATIN: 100000 POWDER TOPICAL at 08:56

## 2024-07-23 RX ADMIN — HEPARIN SODIUM 30 UNITS/KG/HR: 10000 INJECTION, SOLUTION INTRAVENOUS at 14:11

## 2024-07-23 RX ADMIN — ACETAMINOPHEN 975 MG: 325 TABLET, FILM COATED ORAL at 05:53

## 2024-07-23 RX ADMIN — HYDROMORPHONE HYDROCHLORIDE 0.5 MG: 1 INJECTION, SOLUTION INTRAMUSCULAR; INTRAVENOUS; SUBCUTANEOUS at 21:38

## 2024-07-23 RX ADMIN — CARVEDILOL 6.25 MG: 6.25 TABLET, FILM COATED ORAL at 08:52

## 2024-07-23 RX ADMIN — SEVELAMER HYDROCHLORIDE 800 MG: 800 TABLET ORAL at 12:08

## 2024-07-23 RX ADMIN — TORSEMIDE 100 MG: 100 TABLET ORAL at 08:52

## 2024-07-23 RX ADMIN — HEPARIN SODIUM 26 UNITS/KG/HR: 10000 INJECTION, SOLUTION INTRAVENOUS at 06:17

## 2024-07-23 RX ADMIN — HYDROMORPHONE HYDROCHLORIDE 0.5 MG: 1 INJECTION, SOLUTION INTRAMUSCULAR; INTRAVENOUS; SUBCUTANEOUS at 12:08

## 2024-07-23 RX ADMIN — ISOSORBIDE MONONITRATE 60 MG: 60 TABLET, EXTENDED RELEASE ORAL at 08:52

## 2024-07-23 RX ADMIN — WARFARIN SODIUM 12.5 MG: 7.5 TABLET ORAL at 17:11

## 2024-07-23 RX ADMIN — SEVELAMER HYDROCHLORIDE 800 MG: 800 TABLET ORAL at 08:52

## 2024-07-23 RX ADMIN — NIFEDIPINE 30 MG: 30 TABLET, FILM COATED, EXTENDED RELEASE ORAL at 08:52

## 2024-07-23 RX ADMIN — HYDROMORPHONE HYDROCHLORIDE 0.5 MG: 1 INJECTION, SOLUTION INTRAMUSCULAR; INTRAVENOUS; SUBCUTANEOUS at 17:26

## 2024-07-23 RX ADMIN — ATORVASTATIN CALCIUM 80 MG: 40 TABLET, FILM COATED ORAL at 08:52

## 2024-07-23 RX ADMIN — CARVEDILOL 6.25 MG: 6.25 TABLET, FILM COATED ORAL at 16:00

## 2024-07-23 RX ADMIN — OXYCODONE HYDROCHLORIDE 10 MG: 10 TABLET ORAL at 11:05

## 2024-07-23 RX ADMIN — ACETAMINOPHEN 975 MG: 325 TABLET, FILM COATED ORAL at 20:40

## 2024-07-23 RX ADMIN — ONDANSETRON 4 MG: 2 INJECTION INTRAMUSCULAR; INTRAVENOUS at 11:05

## 2024-07-23 RX ADMIN — HYDROMORPHONE HYDROCHLORIDE 0.5 MG: 1 INJECTION, SOLUTION INTRAMUSCULAR; INTRAVENOUS; SUBCUTANEOUS at 08:59

## 2024-07-23 RX ADMIN — NYSTATIN: 100000 POWDER TOPICAL at 17:11

## 2024-07-23 RX ADMIN — SEVELAMER HYDROCHLORIDE 800 MG: 800 TABLET ORAL at 16:00

## 2024-07-23 RX ADMIN — HEPARIN SODIUM 28 UNITS/KG/HR: 10000 INJECTION, SOLUTION INTRAVENOUS at 20:47

## 2024-07-23 RX ADMIN — OXYCODONE HYDROCHLORIDE 5 MG: 5 TABLET ORAL at 23:18

## 2024-07-23 RX ADMIN — HEPARIN SODIUM 10000 UNITS: 1000 INJECTION INTRAVENOUS; SUBCUTANEOUS at 08:42

## 2024-07-23 RX ADMIN — OXYCODONE HYDROCHLORIDE 10 MG: 10 TABLET ORAL at 05:53

## 2024-07-23 RX ADMIN — OXYCODONE HYDROCHLORIDE 10 MG: 10 TABLET ORAL at 16:00

## 2024-07-23 RX ADMIN — ACETAMINOPHEN 975 MG: 325 TABLET, FILM COATED ORAL at 14:10

## 2024-07-23 NOTE — PROGRESS NOTES
[facility]  Progress Note  Name: Joaquin Frankel I  MRN: [id number]  Unit/Bed#: S MS [facility]-01 I Date of Admission: 7/4/2024   Date of Service: 7/23/2024 I Hospital Day: 19    Assessment & Plan   * Pulmonary embolism (HCC)  Assessment & Plan  Patient presented for worsening chest pain, shortness of breath, and dyspnea on exertion.  Patient was admitted for similar chest pain in early June and was found to have ESRD, started on HD MWF   D-Dimer 4.21  CTA showed subsegmental right lower lobe pulmonary embolism  7/12/24 Heparin was subtherapeutic this morning, PTT 55. No bolus was given or increase in gtt as patient will have the gtt stopped at 10 am pending IR procedure.  Patient has periods of hypoxia in the 80s supplemental oxygen encouraged.    Plan:  On IV heparing, bridging to warfarin. Increase to warfarin 12.5 mg tonight.  Goal INR 2-3  Monitor respiratory status, maintain o2 sat above 90% with supplementary O2 as needed    ESRD (end stage renal disease) on dialysis (HCC)  Assessment & Plan  Lab Results   Component Value Date    EGFR 7 07/23/2024    EGFR 5 07/22/2024    EGFR 5 07/21/2024    CREATININE 8.08 (H) 07/23/2024    CREATININE 10.90 (H) 07/22/2024    CREATININE 9.86 (H) 07/21/2024     Patient ESRD on Dialysis 3 times a week, MWF  Elevated , c/o of orthopnea   Etiology of CLOVIS: Etiology not entirely clear - Possible MGRS? Pattern of disease progression is not typical for diabetic nephropathy.   Etiology of CKD: Suspected due to diabetic nephropathy, hypertensive nephrosclerosis, obesity related renal dysfunction  Previous baseline creatinine 1.3-1.6 w/ prior episodes of CLOVIS  Serologic workup negative except for IgG kappa on SPEP and UPEP.  Cryoglobulin negative, ANCA negative, anti-dsDNA negative, anti-GBM negative, JOSE ROBERTO negative, C4 34, C3 118  Bone marrow biopsy 6/12/2024: negative for plasma cell neoplasm.   CRRT initiated 6/7/2024. Peak creatinine 10.4  7/12 renal  biopsy was not performed due to hypertension despite sedation and antihypertensive medications. Rescheduled for Monday 7/15/24  7/15 renal biopsy was not completed due to oxygen saturation of 76-89% during dialysis.  Likely due to ANGY/OHS.  Agreed to wear oxygen via nasal cannula.  7/19 L renal biopsy performed.  Results pending    Plan:  Continue HD per nephrology  On torsemide 100 mg daily  Per nephrology, discontinue amlodipine and hydralazine. Start nifedipine.   Monitor I/O, Daily weight  Trend BMP and electrolytes      Hypertension  Assessment & Plan  History of HTN, recent medication change after dx of ESRD  Workup at that time notable for IgG kappa monoclonal gammopathy and s/p bone marrow biopsy on 6/12/2023 to assess for multiple myeloma. Bone marrow biopsy with no evidence of plasma cell neoplasm.   HD on MWF schedule.   Presented to ED with uncontrolled /80. Endorsed headaches and blurry vision in his left eye in the ED that has since resolved.      Plan:  Continue nifedipine, carvedilol 6.25 mg twice daily, Imdur 60 mg once daily, torsemide 100 mg daily  Hold carvedilol if HR <50  Nephrology onbooard   Continue UF with dialysis as BP tolerates     Chest pain  Assessment & Plan  Patient presented with worsening substernal chest pain over several weeks.  H/o chronic angina, CAD, family history of hereditary heart disease on fathers side.   Patient was admitted for similar chest pain in early June and was found to have ESRD, started on HD MWF.  Recent nuclear stress test without significant reversible ischemia  EKG on admission: Sinus Rhythm with PAC's. Repeat EKG showed sinus bradycardia  High-sensitivity troponin negative x 5   . D-Dimer 4.21  CTA: subsegmental right lower lobe pulmonary embolism  07/05/24: Updated echocardiogram on unremarkable  CP Multifactorial from acute on chronic anemia, hypertensive urgency, and acute PE   Patient's chest pain has resolved following hemodialysis and  the improvement of high blood pressure.   7/9: Telemetry discontinued    Plan-  Continue nifedipine, Coreg 6.25 mg BID, Imdur 60 mg daily, atorvastatin 80 mg daily.     Type 2 diabetes mellitus (HCC)  Assessment & Plan  Lab Results   Component Value Date    HGBA1C 5.7 (H) 06/09/2024       Recent Labs     07/22/24  1639 07/22/24  2111 07/23/24  0801 07/23/24  1117   POCGLU 128 99 80 83       Blood Sugar Average: Last 72 hrs:  (P) [id number]    SSI  Hypoglycemia protocol      Anemia  Assessment & Plan    Hemoglobin   Date Value Ref Range Status   07/22/2024 9.2 (L) 12.0 - 17.0 g/dL Final   01/23/2017 14.5 12.6 - 17.7 g/dL Final     Hematocrit   Date Value Ref Range Status   07/22/2024 29.2 (L) 36.5 - 49.3 % Final   01/23/2017 43.8 37.5 - 51.0 % Final       Patient has history of anemia of chronic disease, ESRD  CBC on admission showed hgb 6.6 / hct 21.2  Received 2 units packed RBC's in ED. Hbg 7.6 after transfusion  Hematology following with outpatient f/u scheduled 7/11/2024, patient will reschedule due to overlapping hospital admission   On oral iron twice daily  No active bleeding, on heparin for PE, will switch to oral AC after renal biopsy procedure.  7/8/2024 Patient was transfused 1 unit of blood during hemodialysis. Hemoglobin 8.8, hematocrit 27.1  S/P 3 units packed RBCs  Start IV Venofer 100 mg with each dialysis treatment for total 10 doses starting 7/10/24     Plan:  Hemoglobin stable  Transfuse for Hgb <8.0    Hyponatremia  Assessment & Plan  Lab Results   Component Value Date/Time    SODIUM 133 (L) 07/23/2024 05:58 AM     Likely due to fluid overload.     Plan:  Per nephro, fluid restriction to 1.5 L/day  Continue with hemodialysis  F/u BMP in AM    ANGY (obstructive sleep apnea)  Assessment & Plan  Overnight pulse oximetry obtained: highest O2 sat 99%, his lowest O2 sat 73%, with a mean of 91%. Patient had 120 desaturation events <89%.  He spent an 1 hour and 15 minutes in desaturation  (~15.82%) and and 1 hour and 34 minutes with desaturation <89% (~19.77%).  Counseled patient on importance of maintaining oxygen saturation as oxygen deprivation can lead to hypertension, heart failure, coronary artery disease, pulmonary hypertension, etc.    Patient was advised of need to wear oxygen at night upon discharge. He would like to wait for formal evaluation via sleep study as an outpatient, but agrees to wear supplemental oxygen currently.    Chronic acquired lymphedema  Assessment & Plan  Chronic LLE lymphedema x 3 years after surgical resection of LLE cyst    Plan:  Advised compression and elevation of lower extremity for symptomatic edema relief    Acute respiratory failure with hypoxia (HCC)-resolved as of 7/6/2024  Assessment & Plan  Patient O2 saturation on admission was 83% on RA  Was placed on 3/L Nasal canula FiO2 32%  CTA showed right lower lobe subsegmental PE  Was placed on heparin    Plan:  See PE  Continue supplementary oxygen to maintain o2 sat >90%  Incentive Spirometry           VTE Pharmacologic Prophylaxis: VTE Score: 7 High Risk (Score >/= 5) - Pharmacological DVT Prophylaxis Ordered: heparin drip. Sequential Compression Devices Ordered.    Mobility:   Basic Mobility Inpatient Raw Score: 24  JH-HLM Goal: 8: Walk 250 feet or more  JH-HLM Achieved: 2: Bed activities/Dependent transfer  JH-HLM Goal NOT achieved. Continue with multidisciplinary rounding and encourage appropriate mobility to improve upon JH-HLM goals.    Patient Centered Rounds: I performed bedside rounds with nursing staff today.   Discussions with Specialists or Other Care Team Provider: cardiology, nephrology    Education and Discussions with Family / Patient: Patient declined call to .     Current Length of Stay: 19 day(s)  Current Patient Status: Inpatient   Discharge Plan: Anticipate discharge in 48-72 hrs to home.    Code Status: Level 1 - Full Code    Subjective:  Patient was seen and evaluated at  bedside.  No overnight events.  He endorses continued pain at left kidney biopsy site, but states it has improved. He reports some nausea this morning while eating breakfast, but it quickly resolved without intervention. Denies chest pain, shortness of breath, vomiting, neck pain, any other complaints at this time. Patient was hypoxic overnight.  He continues not to wear supplemental oxygen. Informed him of subtherapeutic INR, and current plan to stay until level is therapeutic.  He understands and agrees.    Objective:     Vitals:   Temp (24hrs), Av.1 °F (36.7 °C), Min:98.1 °F (36.7 °C), Max:98.2 °F (36.8 °C)    Temp:  [98.1 °F (36.7 °C)-98.2 °F (36.8 °C)] 98.1 °F (36.7 °C)  HR:  [56-60] 60  Resp:  [17-18] 17  BP: (134-147)/(73-79) 144/79  SpO2:  [92 %-96 %] 96 %  Body mass index is 58.33 kg/m².     Input and Output Summary (last 24 hours):     Intake/Output Summary (Last 24 hours) at 2024 1500  Last data filed at 2024 1241  Gross per 24 hour   Intake 960 ml   Output 1300 ml   Net -340 ml       Physical Exam:   Physical Exam  Vitals and nursing note reviewed.   Constitutional:       General: He is not in acute distress.     Appearance: He is obese. He is not ill-appearing.   HENT:      Head: Normocephalic and atraumatic.      Right Ear: External ear normal.      Left Ear: External ear normal.      Nose: Nose normal.      Mouth/Throat:      Mouth: Mucous membranes are moist.      Pharynx: Oropharynx is clear.   Eyes:      Extraocular Movements: Extraocular movements intact.      Conjunctiva/sclera: Conjunctivae normal.   Cardiovascular:      Rate and Rhythm: Normal rate and regular rhythm.      Pulses: Normal pulses.      Heart sounds: Normal heart sounds. No murmur heard.     No gallop.   Pulmonary:      Effort: Pulmonary effort is normal. No respiratory distress.      Breath sounds: Normal breath sounds. No wheezing.   Abdominal:      General: Bowel sounds are normal. There is no distension.       Palpations: Abdomen is soft.      Tenderness: There is no abdominal tenderness.      Comments: Lymphedema noted to the left lower pannus  Skin under pannus appears irritated with some areas of maceration.  There is also a small draining wound present.   Musculoskeletal:         General: Normal range of motion.      Cervical back: Normal range of motion and neck supple. No rigidity.      Right lower leg: Edema (violaceous skin changes right lower extremity with healing punctate wounds over shin) present.      Left lower leg: Edema (lymphedema) present.      Comments: Bandage noted to left back.  Bandage not removed.  No surrounding swelling, discoloration, or signs of bleeding noted.  LLE significantly larger than RLE due to chronic lymphedema. Healed surgical incision noted to left calf.    Skin:     General: Skin is warm and dry.      Comments: R IJV PermCath site clear. Dressing intact.   Neurological:      General: No focal deficit present.      Mental Status: He is alert and oriented to person, place, and time. Mental status is at baseline.   Psychiatric:         Mood and Affect: Mood normal.          Additional Data:     Labs:  Results from last 7 days   Lab Units 07/22/24  0511   WBC Thousand/uL 5.69   HEMOGLOBIN g/dL 9.2*   HEMATOCRIT % 29.2*   PLATELETS Thousands/uL 184     Results from last 7 days   Lab Units 07/23/24  0558   SODIUM mmol/L 133*   POTASSIUM mmol/L 4.8   CHLORIDE mmol/L 96   CO2 mmol/L 30   BUN mg/dL 32*   CREATININE mg/dL 8.08*   ANION GAP mmol/L 7   CALCIUM mg/dL 9.3   GLUCOSE RANDOM mg/dL 82     Results from last 7 days   Lab Units 07/23/24  0558   INR  1.07       Results from last 7 days   Lab Units 07/23/24  1117 07/23/24  0801 07/22/24  2111 07/22/24  1639 07/22/24  1051 07/22/24  0756 07/21/24  2132 07/21/24  1614 07/21/24  1134 07/21/24  0855 07/20/24  2117 07/20/24  1536   POC GLUCOSE mg/dl 83 80 99 128 119 78 116 86 108 105 98 103               Lines/Drains:  Invasive Devices        Peripheral Intravenous Line  Duration             Peripheral IV 07/21/24 Right;Ventral (anterior) Forearm 2 days              Hemodialysis Catheter  Duration             HD Permanent Double Catheter 38 days                    Imaging: Reviewed radiology reports from this admission including: chest xray, abdominal/pelvic CT, and ECHO      CT abdomen pelvis wo contrast   Final Result by Cam Rdz MD (07/20 0936)      Given limitations without IV contrast, no perinephric hematoma identified.      Workstation performed: [id number]         IR biopsy kidney random   Final Result by Stuart Nation MD (07/22 0808)   Impression: Successful percutaneous 18-gauge core biopsy of the left kidney yielding a specimen adequate for [place] kit evaluation. A full pathology report will follow.                  Workstation performed: [id number]         XR chest portable   Final Result by Danyell Ford MD (07/15 1655)      No acute cardiopulmonary disease.            Workstation performed: [id number]         IR biopsy kidney random   Final Result by Radames Liu MD (07/12 9653)   CT-guided random kidney biopsy canceled due to inability to optimize blood pressure versus respiratory status.      Plan:   Will need repeat attempt at CT-guided biopsy with anesthesiology assistance, likely general anesthesia.      Workstation performed: [id number]         CTA ED chest PE Study   Final Result by Gurjit Gibbons MD (07/04 2003)      Subsegmental right lower lobe pulmonary embolism.   No CT evidence of right heart strain.      Diffuse patchy groundglass opacities consistent with infectious/inflammatory infiltrate.      Findings were discussed with Dr. Monique Rothamn MD 8:00 p.m. on 7/4/2024            Workstation performed: [id number]         XR chest 1 view portable   Final Result by Danyell Ford MD (07/04 7647)      Mild pulmonary venous congestion.            Workstation performed:  [id number]           Recent Cultures (last 7 days):         Last 24 Hours Medication List:   Current Facility-Administered Medications   Medication Dose Route Frequency Provider Last Rate    acetaminophen  975 mg Oral Q8H [facility] Marsha Martin DO      atorvastatin  80 mg Oral Daily Mily Martin MD      carvedilol  6.25 mg Oral BID With Meals Andrea Longoria MD      heparin (porcine)  3-30 Units/kg/hr (Order-Specific) Intravenous Titrated Job Gutierrze MD 30 Units/kg/hr (07/23/24 1411)    heparin (porcine)  10,000 Units Intravenous Q6H PRN Job Gutierrez MD      heparin (porcine)  5,000 Units Intravenous Q6H PRN Job Gutierrez MD      HYDROmorphone  0.5 mg Intravenous Q3H PRN Cary Wilkinson MD      iron sucrose  100 mg Intravenous After Dialysis Johnny Viramontes MD Stopped (07/22/24 1207)    isosorbide mononitrate  60 mg Oral Daily Mily Martin MD      lidocaine  3 patch Topical Daily Cary Wilkinson MD      melatonin  3 mg Oral HS Cary Wilkinson MD      naloxone  0.04 mg Intravenous Q1MIN PRN Cary Wilkinson MD      NIFEdipine  30 mg Oral Daily Joselyn Reyes Bahamonde, MD      nystatin   Topical BID Lorri Maria MD      ondansetron  4 mg Intravenous Once Cindy Balderas MD      ondansetron  4 mg Intravenous Q8H PRN Cam Hills MD      oxyCODONE  10 mg Oral Q4H PRN Cary Wilkinson MD      oxyCODONE  5 mg Oral Q4H PRN Cary Wilkinson MD      polyethylene glycol  17 g Oral Daily PRN Diane Oliveira MD      sevelamer  800 mg Oral TID With Meals Mily Martin MD      torsemide  100 mg Oral Daily Charles Spear MD      warfarin  12.5 mg Oral Daily (warfarin) Lorri Maria MD          Today, Patient Was Seen By: Deya Palomino MD    **Please Note: This note may have been constructed using a voice recognition system.**

## 2024-07-23 NOTE — PLAN OF CARE
Problem: Potential for Falls  Goal: Patient will remain free of falls  Description: INTERVENTIONS:  - Educate patient/family on patient safety including physical limitations  - Instruct patient to call for assistance with activity   - Consult OT/PT to assist with strengthening/mobility   - Keep Call bell within reach  - Keep bed low and locked with side rails adjusted as appropriate  - Keep care items and personal belongings within reach  - Initiate and maintain comfort rounds  - Make Fall Risk Sign visible to staff  - Offer Toileting every 2 Hours, in advance of need  - Initiate/Maintain bed and chair alarm  - Obtain necessary fall risk management equipment:   - Apply yellow socks and bracelet for high fall risk patients  - Consider moving patient to room near nurses station  Outcome: Progressing     Problem: METABOLIC, FLUID AND ELECTROLYTES - ADULT  Goal: Electrolytes maintained within normal limits  Description: INTERVENTIONS:  - Monitor labs and assess patient for signs and symptoms of electrolyte imbalances  - Administer electrolyte replacement as ordered  - Monitor response to electrolyte replacements, including repeat lab results as appropriate  - Instruct patient on fluid and nutrition as appropriate  Outcome: Progressing  Goal: Fluid balance maintained  Description: INTERVENTIONS:  - Monitor labs   - Monitor I/O and WT  - Instruct patient on fluid and nutrition as appropriate  - Assess for signs & symptoms of volume excess or deficit  Outcome: Progressing     Problem: Prexisting or High Potential for Compromised Skin Integrity  Goal: Skin integrity is maintained or improved  Description: INTERVENTIONS:  - Identify patients at risk for skin breakdown  - Assess and monitor skin integrity  - Assess and monitor nutrition and hydration status  - Monitor labs   - Assess for incontinence   - Turn and reposition patient  - Assist with mobility/ambulation  - Relieve pressure over bony prominences  - Avoid  friction and shearing  - Provide appropriate hygiene as needed including keeping skin clean and dry  - Evaluate need for skin moisturizer/barrier cream  - Collaborate with interdisciplinary team   - Patient/family teaching  - Consider wound care consult   Outcome: Progressing     Problem: Nutrition/Hydration-ADULT  Goal: Nutrient/Hydration intake appropriate for improving, restoring or maintaining nutritional needs  Description: Monitor and assess patient's nutrition/hydration status for malnutrition. Collaborate with interdisciplinary team and initiate plan and interventions as ordered.  Monitor patient's weight and dietary intake as ordered or per policy. Utilize nutrition screening tool and intervene as necessary. Determine patient's food preferences and provide high-protein, high-caloric foods as appropriate.     INTERVENTIONS:  - Monitor oral intake, urinary output, labs, and treatment plans  - Assess nutrition and hydration status and recommend course of action  - Evaluate amount of meals eaten  - Assist patient with eating if necessary   - Allow adequate time for meals  - Recommend/ encourage appropriate diets, oral nutritional supplements, and vitamin/mineral supplements  - Order, calculate, and assess calorie counts as needed  - Recommend, monitor, and adjust tube feedings and TPN/PPN based on assessed needs  - Assess need for intravenous fluids  - Provide specific nutrition/hydration education as appropriate  - Include patient/family/caregiver in decisions related to nutrition  Outcome: Progressing     Problem: PAIN - ADULT  Goal: Verbalizes/displays adequate comfort level or baseline comfort level  Description: Interventions:  - Encourage patient to monitor pain and request assistance  - Assess pain using appropriate pain scale  - Administer analgesics based on type and severity of pain and evaluate response  - Implement non-pharmacological measures as appropriate and evaluate response  - Consider  cultural and social influences on pain and pain management  - Notify physician/advanced practitioner if interventions unsuccessful or patient reports new pain  Outcome: Progressing     Problem: INFECTION - ADULT  Goal: Absence or prevention of progression during hospitalization  Description: INTERVENTIONS:  - Assess and monitor for signs and symptoms of infection  - Monitor lab/diagnostic results  - Monitor all insertion sites, i.e. indwelling lines, tubes, and drains  - Monitor endotracheal if appropriate and nasal secretions for changes in amount and color  - [place] appropriate cooling/warming therapies per order  - Administer medications as ordered  - Instruct and encourage patient and family to use good hand hygiene technique  - Identify and instruct in appropriate isolation precautions for identified infection/condition  Outcome: Progressing  Goal: Absence of fever/infection during neutropenic period  Description: INTERVENTIONS:  - Monitor WBC    Outcome: Progressing     Problem: SAFETY ADULT  Goal: Patient will remain free of falls  Description: INTERVENTIONS:  - Educate patient/family on patient safety including physical limitations  - Instruct patient to call for assistance with activity   - Consult OT/PT to assist with strengthening/mobility   - Keep Call bell within reach  - Keep bed low and locked with side rails adjusted as appropriate  - Keep care items and personal belongings within reach  - Initiate and maintain comfort rounds  - Make Fall Risk Sign visible to staff  - Offer Toileting every 2 Hours, in advance of need  - Initiate/Maintain bed and chair alarm  - Obtain necessary fall risk management equipment:   - Apply yellow socks and bracelet for high fall risk patients  - Consider moving patient to room near nurses station  Outcome: Progressing  Goal: Maintain or return to baseline ADL function  Description: INTERVENTIONS:  -  Assess patient's ability to carry out ADLs; assess patient's baseline  for ADL function and identify physical deficits which impact ability to perform ADLs (bathing, care of mouth/teeth, toileting, grooming, dressing, etc.)  - Assess/evaluate cause of self-care deficits   - Assess range of motion  - Assess patient's mobility; develop plan if impaired  - Assess patient's need for assistive devices and provide as appropriate  - Encourage maximum independence but intervene and supervise when necessary  - Involve family in performance of ADLs  - Assess for home care needs following discharge   - Consider OT consult to assist with ADL evaluation and planning for discharge  - Provide patient education as appropriate  Outcome: Progressing  Goal: Maintains/Returns to pre admission functional level  Description: INTERVENTIONS:  - Perform AM-PAC 6 Click Basic Mobility/ Daily Activity assessment daily.  - Set and communicate daily mobility goal to care team and patient/family/caregiver.   - Collaborate with rehabilitation services on mobility goals if consulted  - Perform Range of Motion 3 times a day.  - Reposition patient every 2 hours.  - Dangle patient 3 times a day  - Stand patient 3 times a day  - Ambulate patient 3 times a day  - Out of bed to chair 3 times a day   - Out of bed for meals 3 times a day  - Out of bed for toileting  - Record patient progress and toleration of activity level   Outcome: Progressing     Problem: DISCHARGE PLANNING  Goal: Discharge to home or other facility with appropriate resources  Description: INTERVENTIONS:  - Identify barriers to discharge w/patient and caregiver  - Arrange for needed discharge resources and transportation as appropriate  - Identify discharge learning needs (meds, wound care, etc.)  - Arrange for interpretive services to assist at discharge as needed  - Refer to Case Management Department for coordinating discharge planning if the patient needs post-hospital services based on physician/advanced practitioner order or complex needs related to  functional status, cognitive ability, or social support system  Outcome: Progressing     Problem: Knowledge Deficit  Goal: Patient/family/caregiver demonstrates understanding of disease process, treatment plan, medications, and discharge instructions  Description: Complete learning assessment and assess knowledge base.  Interventions:  - Provide teaching at level of understanding  - Provide teaching via preferred learning methods  Outcome: Progressing

## 2024-07-23 NOTE — PROGRESS NOTES
NEPHROLOGY HOSPITAL PROGRESS NOTE   Joaquin Frankel 44 y.o. male MRN: [id number]  Unit/Bed#: S -01 Encounter: [id number]  Reason for Consult: Acute kidney injury    ASSESSMENT and PLAN:  44-year-old male with a history of diabetes mellitus, hypertension, CAD, monoclonal gammopathy, morbid obesity, left lower extremity lymphedema who presented with shortness of breath.  Nephrology consulted for CLOVIS.    Acute kidney injury:  Etiology: Unclear etiology possible MGR S status post renal biopsy on 7/19 waiting for result.  Workup:  UACR 5288 mg/G  Urinalysis with 3+ protein, 10-20 RBCs  CT without contrast: Kidneys unremarkable.  No hydronephrosis.  No evidence of hematoma  Required initiation of hemodialysis.  Status post multiple treatments the last being 7/22.  Outpatient placement established at [facility] which is where patient works.  He has a plan for dialysis after work  No evidence of renal recovery although he reports making more urine  Next hemodialysis treatment planned for 7/24    Access: Tunneled dialysis catheter placed on 6/14    Electrolytes/acid-base: Acceptable    Mild hyponatremia:  Volume mediated.  Likely due to decreased ability to excrete free water    Hypertension/volume status:  Hypervolemic with lower extremity edema/severe left lower extremity lymphedema  Current medications: Nifedipine 30 mg daily, carvedilol 6.25 mg twice a day.  Isosorbide mononitrate 60 mg daily.  Diuretic on torsemide 100 mg daily  Volume removal on hemodialysis as tolerated  Blood pressure acceptable at this time.  Near goal.  Avoid hypotension    CKD-MBD:  On sevelamer for control of hyperphosphatemia  Outpatient monitoring and hemodialysis unit after discharge    Anemia of chronic disease  Etiology: CKD, monoclonal gammopathy  Hemoglobin below goal.  Goal hemoglobin 10-11.  Monitor  No JOSSELYN due to monoclonal gammopathy    Monoclonal gammopathy  Status post bone marrow biopsy  Management per  oncology    Pulmonary embolism:  On heparin infusion      PLAN SUMMARY:  Continue hemodialysis.  Next hemodialysis treatment tomorrow.  Awaiting results of renal biopsy.  Avoid nephrotoxic agents, hypotension    SUBJECTIVE / 24H INTERVAL HISTORY:  Intermittent mild nausea reported otherwise feels okay.  Believes he is urinating a little bit more now.    OBJECTIVE:  Current Weight: Weight - Scale: (!) 169 kg (372 lb 6.4 oz)  Vitals:    07/22/24 2219 07/23/24 0543 07/23/24 0757 07/23/24 0852   BP: 134/75  147/73 144/79   BP Location:   Left arm    Pulse: 56  60 60   Resp: 18  17    Temp: 98.2 °F (36.8 °C)  98.1 °F (36.7 °C)    TempSrc:   Oral    SpO2: 92%  96%    Weight:  (!) 169 kg (372 lb 6.4 oz)     Height:           Intake/Output Summary (Last 24 hours) at 7/23/2024 1153  Last data filed at 7/23/2024 1123  Gross per 24 hour   Intake 1080 ml   Output 4700 ml   Net -3620 ml     General: NAD, lying in bed  Skin: no rash  Eyes: anicteric sclera  ENT: moist mucous membrane  Neck: supple  Chest: CTA b/l, no ronchii, no wheeze, no rubs, no rales.  Normal effort  CVS: s1s2, no murmur, no gallop, no rub.  Normal rate regular rhythm  Abdomen: soft, nontender, nl sounds.  Nondistended appearing  Extremities: +1 edema right lower extremity.  Severe lymphedema left lower extremity  : no lewis  Neuro: AAOX3  Psych: normal affect   Medications:    Current Facility-Administered Medications:     acetaminophen (TYLENOL) tablet 975 mg, 975 mg, Oral, Q8H [facility], Marsha Martin DO, 975 mg at 07/23/24 0553    atorvastatin (LIPITOR) tablet 80 mg, 80 mg, Oral, Daily, Mily Martin MD, 80 mg at 07/23/24 0852    carvedilol (COREG) tablet 6.25 mg, 6.25 mg, Oral, BID With Meals, Andrea Longoria MD, 6.25 mg at 07/23/24 0852    heparin (porcine) 25,000 units in 0.45% NaCl 250 mL infusion (premix), 3-30 Units/kg/hr (Order-Specific), Intravenous, Titrated, Job Gutierrez MD, Last Rate: 37.5 mL/hr at 07/23/24 0846, 30 Units/kg/hr at  07/23/24 0846    heparin (porcine) injection 10,000 Units, 10,000 Units, Intravenous, Q6H PRN, Job Gutierrez MD, 10,000 Units at 07/23/24 0842    heparin (porcine) injection 5,000 Units, 5,000 Units, Intravenous, Q6H PRN, Job Gutierrez MD, 5,000 Units at 07/21/24 1006    HYDROmorphone (DILAUDID) injection 0.5 mg, 0.5 mg, Intravenous, Q3H PRN, Cary Wilkinson MD, 0.5 mg at 07/23/24 0859    iron sucrose (VENOFER) 100 mg in sodium chloride 0.9 % 100 mL IVPB, 100 mg, Intravenous, After Dialysis, Johnny Viramontes MD, Stopped at 07/22/24 1207    isosorbide mononitrate (IMDUR) 24 hr tablet 60 mg, 60 mg, Oral, Daily, Mily Martin MD, 60 mg at 07/23/24 0852    lidocaine (LIDODERM) 5 % patch 3 patch, 3 patch, Topical, Daily, Cary Wilkinson MD    melatonin tablet 3 mg, 3 mg, Oral, HS, Cary Wilkinson MD, 3 mg at 07/22/24 2125    naloxone (NARCAN) 0.04 mg/mL syringe 0.04 mg, 0.04 mg, Intravenous, Q1MIN PRN, Cary Wilkinson MD    NIFEdipine (PROCARDIA XL) 24 hr tablet 30 mg, 30 mg, Oral, Daily, Joselyn Reyes Bahamonde, MD, 30 mg at 07/23/24 0852    nystatin (MYCOSTATIN) powder, , Topical, BID, Lorri Maria MD, Given at 07/23/24 0856    ondansetron (ZOFRAN) injection 4 mg, 4 mg, Intravenous, Once, Cindy Balderas MD    ondansetron (ZOFRAN) injection 4 mg, 4 mg, Intravenous, Q8H PRN, Cam Hills MD, 4 mg at 07/23/24 1105    oxyCODONE (ROXICODONE) immediate release tablet 10 mg, 10 mg, Oral, Q4H PRN, Cary Wilkinson MD, 10 mg at 07/23/24 1105    oxyCODONE (ROXICODONE) IR tablet 5 mg, 5 mg, Oral, Q4H PRN, Cary Wilkinson MD    polyethylene glycol (MIRALAX) packet 17 g, 17 g, Oral, Daily PRN, Diane Oliveira MD    sevelamer (RENAGEL) tablet 800 mg, 800 mg, Oral, TID With Meals, Mily Martin MD, 800 mg at 07/23/24 0852    torsemide (DEMADEX) tablet 100 mg, 100 mg, Oral, Daily, Charles Spear MD, 100 mg at 07/23/24 0852    warfarin (COUMADIN) tablet 12.5 mg, 12.5 mg, Oral, Daily (warfarin), Lorri Maria MD    Laboratory Results:  Results from  last 7 days   Lab Units 07/23/24  0558 07/22/24  0511 07/21/24  0907 07/20/24  1020 07/20/24  0538 07/20/24  0536 07/19/24  2020 07/19/24  0613 07/18/24  0535 07/17/24  0735   WBC Thousand/uL  --  5.69  --  6.24  --  6.23 6.71 6.12 5.56 5.69   HEMOGLOBIN g/dL  --  9.2*  --  9.2*  --  9.2* 8.9* 9.3* 8.9* 9.1*   HEMATOCRIT %  --  29.2*  --  29.5*  --  29.7* 28.6* 29.7* 28.6* 29.0*   PLATELETS Thousands/uL  --  184  --  189  --  185 174 189 182 201   POTASSIUM mmol/L 4.8 5.1 4.7  --  4.9  --   --  5.0 4.2 4.4   CHLORIDE mmol/L 96 96 96  --  96  --   --  96 95* 93*   CO2 mmol/L 30 27 29  --  30  --   --  27 29 30   BUN mg/dL 32* 50* 42*  --  33*  --   --  41* 31* 40*   CREATININE mg/dL 8.08* 10.90* 9.86*  --  8.65*  --   --  9.40* 7.66* 9.55*   CALCIUM mg/dL 9.3 9.2 9.1  --  9.2  --   --  9.3 8.7 9.1   MAGNESIUM mg/dL  --   --   --   --  2.0  --   --  2.0 1.9 2.1   PHOSPHORUS mg/dL  --   --   --   --  5.2*  --   --  5.1* 4.5 5.4*

## 2024-07-24 ENCOUNTER — APPOINTMENT (INPATIENT)
Dept: DIALYSIS | Facility: HOSPITAL | Age: 44
DRG: 175 | End: 2024-07-24
Attending: STUDENT IN AN ORGANIZED HEALTH CARE EDUCATION/TRAINING PROGRAM
Payer: COMMERCIAL

## 2024-07-24 LAB
ALBUMIN SERPL BCG-MCNC: 3.6 G/DL (ref 3.5–5)
ALP SERPL-CCNC: 64 U/L (ref 34–104)
ALT SERPL W P-5'-P-CCNC: 6 U/L (ref 7–52)
ANION GAP SERPL CALCULATED.3IONS-SCNC: 8 MMOL/L (ref 4–13)
APTT PPP: 116 SECONDS (ref 23–37)
APTT PPP: 167 SECONDS (ref 23–37)
APTT PPP: 87 SECONDS (ref 23–37)
AST SERPL W P-5'-P-CCNC: 8 U/L (ref 13–39)
BILIRUB SERPL-MCNC: 0.29 MG/DL (ref 0.2–1)
BUN SERPL-MCNC: 41 MG/DL (ref 5–25)
CALCIUM SERPL-MCNC: 9.3 MG/DL (ref 8.4–10.2)
CHLORIDE SERPL-SCNC: 98 MMOL/L (ref 96–108)
CO2 SERPL-SCNC: 29 MMOL/L (ref 21–32)
CREAT SERPL-MCNC: 9.46 MG/DL (ref 0.6–1.3)
GFR SERPL CREATININE-BSD FRML MDRD: 6 ML/MIN/1.73SQ M
GLUCOSE SERPL-MCNC: 108 MG/DL (ref 65–140)
GLUCOSE SERPL-MCNC: 112 MG/DL (ref 65–140)
GLUCOSE SERPL-MCNC: 118 MG/DL (ref 65–140)
GLUCOSE SERPL-MCNC: 78 MG/DL (ref 65–140)
GLUCOSE SERPL-MCNC: 80 MG/DL (ref 65–140)
HIV 1+2 AB+HIV1 P24 AG SERPL QL IA: NORMAL
HIV1 P24 AG SER QL: NORMAL
INR PPP: 1.45 (ref 0.84–1.19)
POTASSIUM SERPL-SCNC: 5 MMOL/L (ref 3.5–5.3)
PROT SERPL-MCNC: 8.2 G/DL (ref 6.4–8.4)
PROTHROMBIN TIME: 18.4 SECONDS (ref 11.6–14.5)
SODIUM SERPL-SCNC: 135 MMOL/L (ref 135–147)

## 2024-07-24 PROCEDURE — 85610 PROTHROMBIN TIME: CPT

## 2024-07-24 PROCEDURE — 80053 COMPREHEN METABOLIC PANEL: CPT | Performed by: STUDENT IN AN ORGANIZED HEALTH CARE EDUCATION/TRAINING PROGRAM

## 2024-07-24 PROCEDURE — 90935 HEMODIALYSIS ONE EVALUATION: CPT | Performed by: STUDENT IN AN ORGANIZED HEALTH CARE EDUCATION/TRAINING PROGRAM

## 2024-07-24 PROCEDURE — 99232 SBSQ HOSP IP/OBS MODERATE 35: CPT | Performed by: HOSPITALIST

## 2024-07-24 PROCEDURE — 87806 HIV AG W/HIV1&2 ANTB W/OPTIC: CPT | Performed by: STUDENT IN AN ORGANIZED HEALTH CARE EDUCATION/TRAINING PROGRAM

## 2024-07-24 PROCEDURE — 82948 REAGENT STRIP/BLOOD GLUCOSE: CPT

## 2024-07-24 PROCEDURE — 85730 THROMBOPLASTIN TIME PARTIAL: CPT | Performed by: HOSPITALIST

## 2024-07-24 PROCEDURE — 86747 PARVOVIRUS ANTIBODY: CPT | Performed by: STUDENT IN AN ORGANIZED HEALTH CARE EDUCATION/TRAINING PROGRAM

## 2024-07-24 RX ADMIN — HEPARIN SODIUM 25 UNITS/KG/HR: 10000 INJECTION, SOLUTION INTRAVENOUS at 05:59

## 2024-07-24 RX ADMIN — OXYCODONE HYDROCHLORIDE 5 MG: 5 TABLET ORAL at 22:21

## 2024-07-24 RX ADMIN — CARVEDILOL 6.25 MG: 6.25 TABLET, FILM COATED ORAL at 17:06

## 2024-07-24 RX ADMIN — ACETAMINOPHEN 975 MG: 325 TABLET, FILM COATED ORAL at 22:16

## 2024-07-24 RX ADMIN — HEPARIN SODIUM 23 UNITS/KG/HR: 10000 INJECTION, SOLUTION INTRAVENOUS at 13:48

## 2024-07-24 RX ADMIN — ATORVASTATIN CALCIUM 80 MG: 40 TABLET, FILM COATED ORAL at 13:48

## 2024-07-24 RX ADMIN — TORSEMIDE 100 MG: 100 TABLET ORAL at 13:48

## 2024-07-24 RX ADMIN — POLYETHYLENE GLYCOL 3350 17 G: 17 POWDER, FOR SOLUTION ORAL at 17:13

## 2024-07-24 RX ADMIN — SEVELAMER HYDROCHLORIDE 800 MG: 800 TABLET ORAL at 13:47

## 2024-07-24 RX ADMIN — Medication 3 MG: at 22:16

## 2024-07-24 RX ADMIN — ISOSORBIDE MONONITRATE 60 MG: 60 TABLET, EXTENDED RELEASE ORAL at 13:48

## 2024-07-24 RX ADMIN — NIFEDIPINE 30 MG: 30 TABLET, FILM COATED, EXTENDED RELEASE ORAL at 13:48

## 2024-07-24 RX ADMIN — OXYCODONE HYDROCHLORIDE 10 MG: 10 TABLET ORAL at 13:42

## 2024-07-24 RX ADMIN — HYDROMORPHONE HYDROCHLORIDE 0.5 MG: 1 INJECTION, SOLUTION INTRAMUSCULAR; INTRAVENOUS; SUBCUTANEOUS at 03:46

## 2024-07-24 RX ADMIN — ACETAMINOPHEN 975 MG: 325 TABLET, FILM COATED ORAL at 13:42

## 2024-07-24 RX ADMIN — OXYCODONE HYDROCHLORIDE 10 MG: 10 TABLET ORAL at 17:06

## 2024-07-24 RX ADMIN — OXYCODONE HYDROCHLORIDE 10 MG: 10 TABLET ORAL at 05:59

## 2024-07-24 RX ADMIN — HEPARIN SODIUM 23 UNITS/KG/HR: 10000 INJECTION, SOLUTION INTRAVENOUS at 22:16

## 2024-07-24 RX ADMIN — OXYCODONE HYDROCHLORIDE 10 MG: 10 TABLET ORAL at 01:44

## 2024-07-24 RX ADMIN — HYDROMORPHONE HYDROCHLORIDE 0.5 MG: 1 INJECTION, SOLUTION INTRAMUSCULAR; INTRAVENOUS; SUBCUTANEOUS at 18:32

## 2024-07-24 RX ADMIN — ACETAMINOPHEN 975 MG: 325 TABLET, FILM COATED ORAL at 05:59

## 2024-07-24 RX ADMIN — SEVELAMER HYDROCHLORIDE 800 MG: 800 TABLET ORAL at 17:06

## 2024-07-24 RX ADMIN — WARFARIN SODIUM 12.5 MG: 7.5 TABLET ORAL at 17:06

## 2024-07-24 NOTE — CASE MANAGEMENT
Case Management Discharge Planning Note    Patient name Joaquin Frankel  Location S /S -01 MRN [id number]  : 1980 Date 2024       Current Admission Date: 2024  Current Admission Diagnosis:Pulmonary embolism ([facility])   Patient Active Problem List    Diagnosis Date Noted Date Diagnosed    MGUS (monoclonal gammopathy of unknown significance) 2024     Pulmonary embolism ([facility]) 2024     ESRD (end stage renal disease) on dialysis ([facility]) 2024     Anemia 2024     Type 2 diabetes mellitus ([facility]) 2024     Wound of abdomen 2024     Volume overload 2024     Surgical follow-up care 2024     Urinary problem in male 2024     Noncompliance with diet and medication regimen 2024     Epidermoid cyst of skin of scalp 2024     Stopped smoking with greater than 20 pack year history 2024     CKD (chronic kidney disease) stage 4, GFR 15-29 ml/min ([facility]) 2024     ANGY (obstructive sleep apnea) 2024     Long term (current) use of immunomodulator 2023     Chronic acquired lymphedema 06/15/2022     History of coronary angioplasty with insertion of stent 2022     Anemia of chronic disease 2022     History of hidradenitis suppurativa 2022     Nocturnal hypoxia 2021     Chest pain 2021     Cellulitis of left lower extremity 2021     Morbid obesity ([facility]) 03/10/2021     Hyponatremia 2021     Acute renal failure (ARF) ([facility]) 2021     Diabetes ([facility]) 2018     Other hyperlipidemia 2017     Hypertension 2016     Coronary artery disease with angina pectoris ([facility]) 2014       LOS (days): 20  Geometric Mean LOS (GMLOS) (days): 4  Days to GMLOS:-15.5     OBJECTIVE:  Risk of Unplanned Readmission Score: 45.27         Current admission status: Inpatient   Preferred Pharmacy:   [facility] [id number] - [place], [place] - [address] [facility] [address] [facility]  [address]  [facility] [id number]  Phone: [phone] Fax: [phone]    [facility] [address] [facility] [address]  [address], [place] [id number]  [facility] [address]  Phone: [phone] Fax: [phone]    Primary Care Provider: Hany Mcfarland DO    Primary Insurance: BLUE CROSS  Secondary Insurance:     DISCHARGE DETAILS:    Per [facility] provider, Pt is not medically ready for discharge. Pt is expected to be ready in 24-48 hours. CM anticipates no aftercare needs.

## 2024-07-24 NOTE — PLAN OF CARE
Post-Dialysis RN Treatment Note    Blood Pressure:  Pre:  162/78 mm/Hg  Post:  145/80 mmHg   EDW:  172.5 kg    Pre weight:   170.0 kg   Post:  168.0  kg   Mode of weight measurement: Standing Scale   Volume Removed:  2000  ml    Treatment duration:   210 minutes    NS given:   No    Treatment shortened? No   Medications given during Rx:   None Reported   Estimated Kt/V:    Not Applicable   Access type:    Permacath/TDC   Access Issues:    No    Report called to primary nurse:   Ghazal Nolasco    Problem: METABOLIC, FLUID AND ELECTROLYTES - ADULT  Goal: Electrolytes maintained within normal limits  Description: INTERVENTIONS:  - Monitor labs and assess patient for signs and symptoms of electrolyte imbalances  - Administer electrolyte replacement as ordered  - Monitor response to electrolyte replacements, including repeat lab results as appropriate  - Instruct patient on fluid and nutrition as appropriate  Outcome: Progressing  Goal: Fluid balance maintained  Description: INTERVENTIONS:  - Monitor labs   - Monitor I/O and WT  - Instruct patient on fluid and nutrition as appropriate  - Assess for signs & symptoms of volume excess or deficit  Outcome: Progressing      Not applicable

## 2024-07-24 NOTE — UTILIZATION REVIEW
Continued Stay Review    Date: 7/24/24                          Current Patient Class: Inpatient  Current Level of Care: Med Surg    HPI:44 y.o. male initially admitted on 7/4 due to chest pain, Acute hypoxic respiratory failure, Pulmonary Embolism and found to have ESRD, started on HD.    Assessment/Plan: Pt currently on RA saturating 91%. Pt remains on Hep gtt bridge to coumadin w/ today's INR 1.45. As per Nephrology, pt to continue w/ HD and tolerating without issues. Renal bx shows primarily diabetic nephropathy. Unlikely recovery although pt reports he's making more urine. On exam, pt c/o pain to L kidney bx site pain. Lymphedema noted to B/L LE  and left lower panus.  Plan: Continue IV hep bridge to Warfarin w/ INR goal goal 2-3. Warfarin 12.5mg tonight. Monitor O2 sat - keep above 90%. Continue HD per nephrology. Continue Nifedipine 30 mg daily, carvedilol 6.25 mg BID, Imdur 60 mg daily, torsemide 100 mg daily,Monitor I/O, Daily weight. Fluid restriction 1.5L/day, Pain control.     Vital Signs (last 3 days)       Date/Time Temp Pulse Resp BP MAP (mmHg) SpO2 Calculated FIO2 (%) - Nasal Cannula Nasal Cannula O2 Flow Rate (L/min) O2 Device Patient Position - Orthostatic VS [place] Coma Scale Score Pain    07/24/24 1342 -- -- -- -- -- -- -- -- -- -- -- 8    07/24/24 1210 98 °F (36.7 °C) 64 16 145/80 -- -- -- -- -- Lying -- --    07/24/24 1200 -- 62 16 133/78 -- -- -- -- -- Lying -- --    07/24/24 1130 -- 57 16 151/80 -- -- -- -- -- Lying -- --    07/24/24 1100 -- 64 16 146/78 -- -- -- -- -- Lying -- --    07/24/24 1030 -- 75 16 155/82 -- -- -- -- -- Lying -- --    07/24/24 1000 -- 74 16 148/81 -- -- -- -- -- Lying -- --    07/24/24 0930 -- 78 16 155/83 -- -- -- -- -- Lying -- --    07/24/24 0900 -- 78 16 158/77 -- -- -- -- -- Lying -- --    07/24/24 0830 97.8 °F (36.6 °C) 77 16 162/78 -- -- -- -- -- Lying -- --    07/24/24 07:28:50 97.8 °F (36.6 °C) 52 17 135/79 98 91 % -- -- None (Room air) Lying -- --     07/24/24 0559 -- -- -- -- -- -- -- -- -- -- -- 7 07/24/24 0346 -- -- -- -- -- -- -- -- -- -- -- 7 07/24/24 0144 -- -- -- -- -- -- -- -- -- -- -- 8 07/23/24 2318 -- -- -- -- -- -- -- -- -- -- -- 7 07/23/24 21:55:01 97.5 °F (36.4 °C) 55 -- 140/73 95 97 % -- -- -- -- -- --    07/23/24 2138 -- -- -- -- -- -- -- -- -- -- -- 7 07/23/24 2040 -- -- -- -- -- -- -- -- -- -- -- 7 07/23/24 1726 -- -- -- -- -- -- -- -- -- -- -- 7 07/23/24 1600 -- -- -- -- -- -- -- -- -- -- -- 7 07/23/24 15:45:52 97.7 °F (36.5 °C) 55 17 118/67 84 95 % -- -- None (Room air) Lying -- --    07/23/24 1410 -- -- -- -- -- -- -- -- -- -- -- 7 07/23/24 1311 -- -- -- -- -- -- -- -- -- -- -- 6 07/23/24 1208 -- -- -- -- -- -- -- -- -- -- -- 7 07/23/24 1105 -- -- -- -- -- -- -- -- -- -- -- 7 07/23/24 0859 -- -- -- -- -- -- -- -- -- -- -- 7 07/23/24 0852 -- 60 -- 144/79 -- -- -- -- -- -- -- --    07/23/24 0850 -- -- -- -- -- -- -- -- -- -- 15 --    07/23/24 07:57:41 98.1 °F (36.7 °C) 60 17 147/73 98 96 % 28 2 L/min Nasal cannula Lying -- --    07/23/24 0553 -- -- -- -- -- -- -- -- -- -- -- 8 07/23/24 0000 -- -- -- -- -- -- -- -- -- -- 15 4    07/22/24 2308 -- -- -- -- -- -- -- -- -- -- -- 7 07/22/24 22:19:17 98.2 °F (36.8 °C) 56 18 134/75 95 92 % -- -- -- -- -- --    07/22/24 2125 -- -- -- -- -- -- -- -- -- -- -- 8 07/22/24 1731 -- -- -- -- -- -- -- -- -- -- -- 7 07/22/24 1530 -- -- -- -- -- -- -- -- -- -- 15 --    07/22/24 15:07:49 98.1 °F (36.7 °C) 60 18 141/76 98 94 % -- -- -- -- -- --    07/22/24 1507 -- -- -- -- -- -- -- -- -- -- -- 8 07/22/24 1303 -- -- -- -- -- -- -- -- -- -- -- 8 07/22/24 12:58:18 98 °F (36.7 °C) 59 18 152/74 100 93 % -- -- None (Room air) Lying -- --    07/22/24 1230 -- 57 18 158/73 101 94 % -- -- -- -- -- --    07/22/24 1200 -- 60 18 161/78 106 95 % -- -- -- -- -- --    07/22/24 1130 -- 52 18 140/72 95 92 % -- -- -- -- -- --    07/22/24 1100 -- 63 18 153/75 101 83 % -- -- --  -- -- --    07/22/24 1030 -- 55 18 145/77 100 94 % -- -- -- -- -- --    07/22/24 1000 -- 55 18 153/93 113 89 % -- -- -- -- -- --    07/22/24 0930 -- 53 18 142/74 97 92 % -- -- -- -- -- --    07/22/24 0925 97.3 °F (36.3 °C) 52 18 134/60 85 93 % -- -- None (Room air) Lying -- --    07/22/24 0900 -- -- -- -- -- -- -- -- -- -- 15 --    07/22/24 07:53:31 98.1 °F (36.7 °C) 51 17 151/75 100 96 % -- -- None (Room air) Lying -- --    07/22/24 0640 -- -- -- -- -- -- -- -- -- -- -- 8    07/22/24 0506 -- -- -- -- -- -- -- -- -- -- -- 7    07/22/24 0502 -- 62 -- 148/79 102 -- -- -- -- Lying -- 5    07/22/24 0112 -- -- -- -- -- -- -- -- -- -- -- 8    07/21/24 2227 -- -- -- -- -- -- -- -- -- -- -- 10 - Worst Possible Pain    07/21/24 21:33:33 97.6 °F (36.4 °C) 56 19 127/75 92 89 % -- -- -- -- -- --    07/21/24 2037 -- -- -- -- -- -- -- -- -- -- -- 10 - Worst Possible Pain    07/21/24 2036 -- -- -- 155/72 -- -- -- -- -- -- -- 10 - Worst Possible Pain    07/21/24 1800 -- -- -- -- -- 90 % -- -- None (Room air) -- -- 8    07/21/24 17:56:24 -- 54 -- 149/70 96 89 % -- -- -- -- -- --    07/21/24 15:28:11 97.7 °F (36.5 °C) 72 18 170/75 107 88 % -- -- -- -- -- --    07/21/24 1440 -- -- -- -- -- 97 % -- -- None (Room air) -- -- --    07/21/24 14:36:26 -- 59 -- 156/73 101 86 % -- -- None (Room air) -- -- --    07/21/24 1435 -- -- -- -- -- -- -- -- -- -- -- 9 07/21/24 1251 -- -- -- -- -- -- -- -- -- -- -- 8 07/21/24 1002 -- -- -- -- -- -- -- -- -- -- -- 7 07/21/24 0900 -- -- -- -- -- -- -- -- -- -- 15 --    07/21/24 07:53:36 97.2 °F (36.2 °C) 58 -- 148/79 102 94 % -- -- -- -- -- --    07/21/24 0753 -- -- -- -- -- -- -- -- -- -- -- 8 07/21/24 05:57:05 -- -- -- 146/67 93 -- -- -- -- -- -- --    07/21/24 0555 -- -- -- -- -- -- -- -- -- -- -- 9    07/21/24 0201 -- -- -- -- -- -- -- -- -- -- -- 7 07/21/24 0101 -- -- -- -- -- -- -- -- -- -- 15 --          Weight (last 2 days)       Date/Time Weight    07/24/24 0600 170 (374.78)     07/23/24 0543 169 (372.4)    07/22/24 0600 172 (379.2)              Pertinent Labs/Diagnostic Results:   Radiology:  CT abdomen pelvis wo contrast   Final Interpretation by Cam Rdz MD (07/20 0936)      Given limitations without IV contrast, no perinephric hematoma identified.      Workstation performed: [id number]         IR biopsy kidney random   Final Interpretation by Stuart Nation MD (07/22 0808)   Impression: Successful percutaneous 18-gauge core biopsy of the left kidney yielding a specimen adequate for Bylas kit evaluation. A full pathology report will follow.                  Workstation performed: [id number]         XR chest portable   Final Interpretation by Danyell Ford MD (07/15 1655)      No acute cardiopulmonary disease.            Workstation performed: [id number]         IR biopsy kidney random   Final Interpretation by Radames Liu MD (07/12 1733)   CT-guided random kidney biopsy canceled due to inability to optimize blood pressure versus respiratory status.      Plan:   Will need repeat attempt at CT-guided biopsy with anesthesiology assistance, likely general anesthesia.      Workstation performed: [id number]         CTA ED chest PE Study   Final Interpretation by Gurjit Gibbons MD (07/04 2003)      Subsegmental right lower lobe pulmonary embolism.   No CT evidence of right heart strain.      Diffuse patchy groundglass opacities consistent with infectious/inflammatory infiltrate.      Findings were discussed with Dr. Monique Rothman MD 8:00 p.m. on 7/4/2024            Workstation performed: [id number]         XR chest 1 view portable   Final Interpretation by Danyell Ford MD (07/04 1707)      Mild pulmonary venous congestion.            Workstation performed: [id number]           Cardiology:  ECG 12 lead   Final Result by Jhon Pedraza DO (07/08 0741)   Normal sinus rhythm   Normal ECG   When compared with ECG of 05-JUL-2024 00:44,  Detail Level: Detailed (unconfirmed)   No significant change was found   Confirmed by Jhon Pedraza ([id number]) on 7/8/2024 7:41:44 AM      ECG 12 lead   Final Result by Andrea Longoria MD (07/06 1125)   Sinus bradycardia with sinus arrhythmia   Cannot rule out Anterior infarct , age undetermined   Abnormal ECG   When compared with ECG of 04-JUL-2024 14:52, (unconfirmed)   Premature atrial complexes are no longer Present   Confirmed by Andrea Longoria ([id number]) on 7/6/2024 11:25:40 AM      ECG 12 lead   Final Result by Andrea Longoria MD (07/06 1107)   Sinus rhythm with Premature atrial complexes   Otherwise normal ECG   When compared with ECG of 21-JUN-2024 15:16,   Premature atrial complexes are now Present   Confirmed by Andrea Longoria ([id number]) on 7/6/2024 11:07:32 AM          Results from last 7 days   Lab Units 07/22/24  0511 07/20/24  1020 07/20/24  0536 07/19/24 2020 07/19/24  0613   WBC Thousand/uL 5.69 6.24 6.23 6.71 6.12   HEMOGLOBIN g/dL 9.2* 9.2* 9.2* 8.9* 9.3*   HEMATOCRIT % 29.2* 29.5* 29.7* 28.6* 29.7*   PLATELETS Thousands/uL 184 189 185 174 189         Results from last 7 days   Lab Units 07/24/24  0604 07/23/24  0558 07/22/24  0511 07/21/24  0907 07/20/24  0538 07/19/24  0613 07/18/24  0535   SODIUM mmol/L 135 133* 133* 133* 133* 131* 130*   POTASSIUM mmol/L 5.0 4.8 5.1 4.7 4.9 5.0 4.2   CHLORIDE mmol/L 98 96 96 96 96 96 95*   CO2 mmol/L 29 30 27 29 30 27 29   ANION GAP mmol/L 8 7 10 8 7 8 6   BUN mg/dL 41* 32* 50* 42* 33* 41* 31*   CREATININE mg/dL 9.46* 8.08* 10.90* 9.86* 8.65* 9.40* 7.66*   EGFR ml/min/1.73sq m 6 7 5 5 6 6 7   CALCIUM mg/dL 9.3 9.3 9.2 9.1 9.2 9.3 8.7   MAGNESIUM mg/dL  --   --   --   --  2.0 2.0 1.9   PHOSPHORUS mg/dL  --   --   --   --  5.2* 5.1* 4.5     Results from last 7 days   Lab Units 07/24/24  0604   AST U/L 8*   ALT U/L 6*   ALK PHOS U/L 64   TOTAL PROTEIN g/dL 8.2   ALBUMIN g/dL 3.6   TOTAL BILIRUBIN mg/dL 0.29     Results from last 7 days   Lab Units 07/24/24  1128 07/24/24  0732 07/23/24  5368  Biopsy Method: Dermablade 07/23/24  1549 07/23/24  1117 07/23/24  0801 07/22/24  2111 07/22/24  1639 07/22/24  1051 07/22/24  0756 07/21/24  2132 07/21/24  1614   POC GLUCOSE mg/dl 108 78 89 112 83 80 99 128 119 78 116 86     Results from last 7 days   Lab Units 07/24/24  0604 07/23/24  0558 07/22/24  0511 07/21/24  0907 07/20/24  0538 07/19/24  0613 07/18/24  0535   GLUCOSE RANDOM mg/dL 80 82 81 103 85 88 94             Results from last 7 days   Lab Units 07/24/24  0811 07/24/24  0604 07/23/24  2316 07/23/24  1555 07/23/24  0558 07/22/24  0511   PROTIME seconds  --  18.4*  --   --  14.5 14.5   INR   --  1.45*  --   --  1.07 1.06   PTT seconds 116*  --  167* 107* 40* 90*         Medications:   Scheduled Medications:  acetaminophen, 975 mg, Oral, Q8H SHAZIA  atorvastatin, 80 mg, Oral, Daily  carvedilol, 6.25 mg, Oral, BID With Meals  isosorbide mononitrate, 60 mg, Oral, Daily  lidocaine, 3 patch, Topical, Daily  melatonin, 3 mg, Oral, HS  NIFEdipine, 30 mg, Oral, Daily  nystatin, , Topical, BID  ondansetron, 4 mg, Intravenous, Once  sevelamer, 800 mg, Oral, TID With Meals  torsemide, 100 mg, Oral, Daily  warfarin, 12.5 mg, Oral, Daily (warfarin)      Continuous IV Infusions:  heparin (porcine), 3-30 Units/kg/hr (Order-Specific), Intravenous, Titrated      PRN Meds:  heparin (porcine), 10,000 Units, Intravenous, Q6H PRN  heparin (porcine), 5,000 Units, Intravenous, Q6H PRN  HYDROmorphone, 0.5 mg, Intravenous, Q3H PRN - 7/20x5 given, 7/21 x4 given, 7/22 x4 given, 7/23 x4 given and 7/24 x1 given  iron sucrose, 100 mg, Intravenous, After Dialysis  naloxone, 0.04 mg, Intravenous, Q1MIN PRN  ondansetron, 4 mg, Intravenous, Q8H PRN - 7/22x1 given, 7/23 x1 given  oxyCODONE, 10 mg, Oral, Q4H PRN  oxyCODONE, 5 mg, Oral, Q4H PRN  polyethylene glycol, 17 g, Oral, Daily PRN        Discharge Plan: D    Network Utilization Review Department  ATTENTION: Please call with any questions or concerns to [phone] and carefully listen to the prompts so that you  Hemostasis: Aluminum Chloride and Electrocautery Consent was obtained from the patient. The risks and benefits to therapy were discussed in detail. Specifically, the risks of infection, scarring, bleeding, prolonged wound healing, incomplete removal, allergy to anesthesia, nerve injury and recurrence were addressed. Prior to the procedure, the treatment site was clearly identified and confirmed by the patient. All components of Universal Protocol/PAUSE Rule completed. are directed to the right person. All voicemails are confidential.   For Discharge needs, contact Care Management DC Support Team at [phone] opt. 2  Send all requests for admission clinical reviews, approved or denied determinations and any other requests to dedicated fax number below belonging to the campus where the patient is receiving treatment. List of dedicated fax numbers for the Facilities:  FACILITY NAME UR FAX NUMBER   ADMISSION DENIALS (Administrative/Medical Necessity) [phone]   DISCHARGE SUPPORT TEAM (NETWORK) [phone]   PARENT CHILD HEALTH (Maternity/NICU/Pediatrics) [phone]   [facility] [phone]   [facility] [phone]   [facility] [phone]   General acute hospital [phone]   [facility] [phone]   [facility] [phone]   [facility] [phone]   [facility] [phone]   [facility] [phone]   Critical access hospital [phone]   [facility] [phone]   [facility] [phone]      Add Variable For Additional Medical Justification: No Path Notes (To The Dermatopathologist): Please check margins. X Size Of Lesion In Cm (Optional): 0.6 Wound Care: Petrolatum Notification Instructions: Patient will be notified of pathology results. However, patient instructed to call the office if not contacted within 2 weeks. Medical Necessity Clause: This procedure was medically necessary because the lesion that was treated was: Post-Care Instructions: I reviewed with the patient in detail post-care instructions. Patient is to keep the biopsy site dry overnight, and then apply bacitracin twice daily until healed. Patient may apply hydrogen peroxide soaks to remove any crusting. Billing Type: Third-Party Bill Anesthesia Type: 1% lidocaine with epinephrine Was A Bandage Applied: Yes Medical Necessity Information: It is in your best interest to select a reason for this procedure from the list below. All of these items fulfill various CMS LCD requirements except the new and changing color options.

## 2024-07-24 NOTE — PROGRESS NOTES
NEPHROLOGY HOSPITAL PROGRESS NOTE   Joaquin Frankel 44 y.o. male MRN: [id number]  Unit/Bed#: S -01 Encounter: [id number]  Reason for Consult: Acute kidney injury    ASSESSMENT and PLAN:  44-year-old male with a history of diabetes mellitus, hypertension, CAD, monoclonal gammopathy, morbid obesity, left lower extremity lymphedema who presented with shortness of breath.  Nephrology consulted for CLOVIS.    Acute kidney injury:  Etiology:   Renal biopsy report shows primarily diabetic nephropathy.  Also a collapsing variant of FSGS.  Unlikely recovery  Workup:  UACR 5288 mg/G  Urinalysis with 3+ protein, 10-20 RBCs  CT without contrast: Kidneys unremarkable.  No hydronephrosis.  No evidence of hematoma  Required initiation of hemodialysis.  Outpatient placement established at [facility] which is where patient works.  He has a plan for dialysis after work  No evidence of renal recovery although he endorses that he is making more urine.  Seen on hemodialysis today.  HEMODIALYSIS PROCEDURE NOTE  The patient was seen and examined on hemodialysis.  Time: 4 hours  Sodium: 135 Blood flow: 400   Dialyzer: F160 Potassium: 2 Dialysate flow: 500   Access: CVC Bicarbonate: 35 Ultrafiltration goal: 2L   No meds      Access: Tunneled dialysis catheter placed on 6/14    Electrolytes/acid-base: Acceptable    Mild hyponatremia:  Volume mediated.  Likely due to decreased ability to excrete free water  Volume removal as tolerated on dialysis    Hypertension/volume status:  Hypervolemic with lower extremity edema/severe left lower extremity lymphedema  Current medications: Nifedipine 30 mg daily, carvedilol 6.25 mg twice a day.  Isosorbide mononitrate 60 mg daily.  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  Goal hemoglobin 10-11.  Monitor  No JOSSELYN due to monoclonal gammopathy    Monoclonal gammopathy  Status post bone marrow biopsy  Management per oncology    Pulmonary embolism:  On heparin infusion      PLAN SUMMARY:  Continue hemodialysis    SUBJECTIVE / 24H INTERVAL HISTORY:  No acute complaints.  States that he urinated more overnight.  Hopeful for recovery.  Seen on hemodialysis treatment.  Tolerating without issues.    OBJECTIVE:  Current Weight: Weight - Scale: (!) 170 kg (374 lb 12.5 oz)  Vitals:    07/24/24 1030 07/24/24 1100 07/24/24 1130 07/24/24 1200   BP: 155/82 146/78 151/80 133/78   BP Location: Right arm Right arm Right arm Right arm   Pulse: 75 64 57 62   Resp: 16 16 16 16   Temp:       TempSrc:       SpO2:       Weight:       Height:           Intake/Output Summary (Last 24 hours) at 7/24/2024 1306  Last data filed at 7/24/2024 0930  Gross per 24 hour   Intake 680 ml   Output 1800 ml   Net -1120 ml     General: NAD  Skin: no rash  Eyes: anicteric sclera  ENT: moist mucous membrane  Neck: supple  Chest: CTA b/l, no ronchii, no wheeze, no rubs, no rales.  Normal effort  CVS: s1s2, no murmur, no gallop, no rub.  Normal rate regular rhythm  Abdomen: soft, nontender, nl sounds  Extremities: Severe lymphedema left lower extremity.  Milder edema right lower extremity.  : no lewis  Neuro: AAOX3  Psych: normal affect   Medications:    Current Facility-Administered Medications:     acetaminophen (TYLENOL) tablet 975 mg, 975 mg, Oral, Q8H [facility] Health[facility] DO, 975 mg at 07/24/24 0559    atorvastatin (LIPITOR) tablet 80 mg, 80 mg, Oral, Daily, Mily Martin MD, 80 mg at 07/23/24 0852    carvedilol (COREG) tablet 6.25 mg, 6.25 mg, Oral, BID With Meals, Andrea Longoria MD, 6.25 mg at 07/23/24 1600    heparin (porcine) 25,000 units in 0.45% NaCl 250 mL infusion (premix), 3-30 Units/kg/hr (Order-Specific), Intravenous, Titrated,  Job Gutierrez MD, Last Rate: 31.3 mL/hr at 07/24/24 0559, 25 Units/kg/hr at 07/24/24 0559    heparin (porcine) injection 10,000 Units, 10,000 Units, Intravenous, Q6H PRN, Job Gutierrez MD, 10,000 Units at 07/23/24 0842    heparin (porcine) injection 5,000 Units, 5,000 Units, Intravenous, Q6H PRN, Job Gutierrez MD, 5,000 Units at 07/21/24 1006    HYDROmorphone (DILAUDID) injection 0.5 mg, 0.5 mg, Intravenous, Q3H PRN, Cary Wilkinson MD, 0.5 mg at 07/24/24 0346    iron sucrose (VENOFER) 100 mg in sodium chloride 0.9 % 100 mL IVPB, 100 mg, Intravenous, After Dialysis, Johnny Viramontes MD, Stopped at 07/22/24 1207    isosorbide mononitrate (IMDUR) 24 hr tablet 60 mg, 60 mg, Oral, Daily, Mily Martin MD, 60 mg at 07/23/24 0852    lidocaine (LIDODERM) 5 % patch 3 patch, 3 patch, Topical, Daily, Cary Wilkinson MD    melatonin tablet 3 mg, 3 mg, Oral, HS, Cary Wilkinson MD, 3 mg at 07/22/24 2125    naloxone (NARCAN) 0.04 mg/mL syringe 0.04 mg, 0.04 mg, Intravenous, Q1MIN PRN, Cary Wilkinson MD    NIFEdipine (PROCARDIA XL) 24 hr tablet 30 mg, 30 mg, Oral, Daily, Joselyn Reyes Bahamonde, MD, 30 mg at 07/23/24 0852    nystatin (MYCOSTATIN) powder, , Topical, BID, Lorri Maria MD, Given at 07/23/24 1711    ondansetron (ZOFRAN) injection 4 mg, 4 mg, Intravenous, Once, Cindy Balderas MD    ondansetron (ZOFRAN) injection 4 mg, 4 mg, Intravenous, Q8H PRN, Cam Hills MD, 4 mg at 07/23/24 1105    oxyCODONE (ROXICODONE) immediate release tablet 10 mg, 10 mg, Oral, Q4H PRN, Cary Wilkinson MD, 10 mg at 07/24/24 0559    oxyCODONE (ROXICODONE) IR tablet 5 mg, 5 mg, Oral, Q4H PRN, Cary Wilkinson MD, 5 mg at 07/23/24 2318    polyethylene glycol (MIRALAX) packet 17 g, 17 g, Oral, Daily PRN, Diane Oliveira MD    sevelamer (RENAGEL) tablet 800 mg, 800 mg, Oral, TID With Meals, Mily Martin MD, 800 mg at 07/23/24 1600    torsemide (DEMADEX) tablet 100 mg, 100 mg, Oral, Daily, Charles Spear MD, 100 mg at 07/23/24 0852    warfarin  (COUMADIN) tablet 12.5 mg, 12.5 mg, Oral, Daily (warfarin), Lorri Maria MD, 12.5 mg at 07/23/24 1711    Laboratory Results:  Results from last 7 days   Lab Units 07/24/24  0604 07/23/24  0558 07/22/24  0511 07/21/24  0907 07/20/24  1020 07/20/24  0538 07/20/24  0536 07/19/24  2020 07/19/24  0613 07/18/24  0535   WBC Thousand/uL  --   --  5.69  --  6.24  --  6.23 6.71 6.12 5.56   HEMOGLOBIN g/dL  --   --  9.2*  --  9.2*  --  9.2* 8.9* 9.3* 8.9*   HEMATOCRIT %  --   --  29.2*  --  29.5*  --  29.7* 28.6* 29.7* 28.6*   PLATELETS Thousands/uL  --   --  184  --  189  --  185 174 189 182   POTASSIUM mmol/L 5.0 4.8 5.1 4.7  --  4.9  --   --  5.0 4.2   CHLORIDE mmol/L 98 96 96 96  --  96  --   --  96 95*   CO2 mmol/L 29 30 27 29  --  30  --   --  27 29   BUN mg/dL 41* 32* 50* 42*  --  33*  --   --  41* 31*   CREATININE mg/dL 9.46* 8.08* 10.90* 9.86*  --  8.65*  --   --  9.40* 7.66*   CALCIUM mg/dL 9.3 9.3 9.2 9.1  --  9.2  --   --  9.3 8.7   MAGNESIUM mg/dL  --   --   --   --   --  2.0  --   --  2.0 1.9   PHOSPHORUS mg/dL  --   --   --   --   --  5.2*  --   --  5.1* 4.5

## 2024-07-24 NOTE — PROGRESS NOTES
[facility]  Progress Note  Name: Joaquin Frankel I  MRN: [id number]  Unit/Bed#: S [facility]-01 I Date of Admission: 7/4/2024   Date of Service: 7/24/2024 I Hospital Day: 20    Assessment & Plan   * Pulmonary embolism (HCC)  Assessment & Plan  Patient presented for worsening chest pain, shortness of breath, and dyspnea on exertion.  Patient was admitted for similar chest pain in early June and was found to have ESRD, started on HD MWF   D-Dimer 4.21  CTA showed subsegmental right lower lobe pulmonary embolism  7/12/24 Heparin was subtherapeutic this morning, PTT 55. No bolus was given or increase in gtt as patient will have the gtt stopped at 10 am pending IR procedure.  Patient has periods of hypoxia in the 80s supplemental oxygen encouraged.    Plan:  On IV heparin, bridging to warfarin. Continue warfarin 12.5 mg tonight.  Goal INR 2-3  Monitor respiratory status, maintain o2 sat above 90% with supplementary O2 as needed    ESRD (end stage renal disease) on dialysis (HCC)  Assessment & Plan  Lab Results   Component Value Date    EGFR 6 07/24/2024    EGFR 7 07/23/2024    EGFR 5 07/22/2024    CREATININE 9.46 (H) 07/24/2024    CREATININE 8.08 (H) 07/23/2024    CREATININE 10.90 (H) 07/22/2024     Patient ESRD on Dialysis 3 times a week, MWF  Elevated , c/o of orthopnea   Etiology of CLOVIS: Etiology not entirely clear - Possible MGRS? Pattern of disease progression is not typical for diabetic nephropathy.   Etiology of CKD: Suspected due to diabetic nephropathy, hypertensive nephrosclerosis, obesity related renal dysfunction  Previous baseline creatinine 1.3-1.6 w/ prior episodes of CLOVIS  Serologic workup negative except for IgG kappa on SPEP and UPEP.  Cryoglobulin negative, ANCA negative, anti-dsDNA negative, anti-GBM negative, JOSE ROBERTO negative, C4 34, C3 118  Bone marrow biopsy 6/12/2024: negative for plasma cell neoplasm.   CRRT initiated 6/7/2024. Peak creatinine 10.4  7/12 renal  biopsy was not performed due to hypertension despite sedation and antihypertensive medications. Rescheduled for Monday 7/15/24  7/15 renal biopsy was not completed due to oxygen saturation of 76-89% during dialysis.  Likely due to ANGY/OHS.  Agreed to wear oxygen via nasal cannula.  7/19 L renal biopsy performed. 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 No evidence of MGRS on biopsy     Plan:  Continue HD per nephrology  Per nephrology, continue Nifedipine 30 mg daily, carvedilol 6.25 mg BID, Imdur 60 mg daily, torsemide 100 mg daily   Monitor I/O, Daily weight      Hypertension  Assessment & Plan  History of HTN, recent medication change after dx of ESRD  Workup at that time notable for IgG kappa monoclonal gammopathy and s/p bone marrow biopsy on 6/12/2023 to assess for multiple myeloma. Bone marrow biopsy with no evidence of plasma cell neoplasm.   HD on MWF schedule.   Presented to ED with uncontrolled /80. Endorsed headaches and blurry vision in his left eye in the ED that has since resolved.      Plan:  Continue nifedipine 30 mg, carvedilol 6.25 mg twice daily, Imdur 60 mg once daily, torsemide 100 mg daily  Hold carvedilol if HR <50  Nephrology onbooard   Continue UF with dialysis as BP tolerates     Chest pain  Assessment & Plan  Patient presented with worsening substernal chest pain over several weeks.  H/o chronic angina, CAD, family history of hereditary heart disease on fathers side.   Patient was admitted for similar chest pain in early June and was found to have ESRD, started on HD MWF.  Recent nuclear stress test without significant reversible ischemia  EKG on admission: Sinus Rhythm with PAC's. Repeat EKG showed sinus bradycardia  High-sensitivity troponin negative x 5   . D-Dimer 4.21  CTA: subsegmental right lower lobe pulmonary embolism  07/05/24: Updated echocardiogram on unremarkable  CP Multifactorial from acute on chronic  anemia, hypertensive urgency, and acute PE   Patient's chest pain has resolved following hemodialysis and the improvement of high blood pressure.   7/9: Telemetry discontinued    Plan-  Continue nifedipine 30 mg, Coreg 6.25 mg BID, Imdur 60 mg daily, atorvastatin 80 mg daily.     Type 2 diabetes mellitus (HCC)  Assessment & Plan  Lab Results   Component Value Date    HGBA1C 5.7 (H) 06/09/2024       Recent Labs     07/23/24  1549 07/23/24  2157 07/24/24  0732 07/24/24  1128   POCGLU 112 89 78 108       Blood Sugar Average: Last 72 hrs:  (P) [id number]    SSI  Hypoglycemia protocol      Anemia  Assessment & Plan    Hemoglobin   Date Value Ref Range Status   07/22/2024 9.2 (L) 12.0 - 17.0 g/dL Final   01/23/2017 14.5 12.6 - 17.7 g/dL Final     Hematocrit   Date Value Ref Range Status   07/22/2024 29.2 (L) 36.5 - 49.3 % Final   01/23/2017 43.8 37.5 - 51.0 % Final       Patient has history of anemia of chronic disease, ESRD  CBC on admission showed hgb 6.6 / hct 21.2  Received 2 units packed RBC's in ED. Hbg 7.6 after transfusion  Hematology following with outpatient f/u scheduled 7/11/2024, patient will reschedule due to overlapping hospital admission   On oral iron twice daily  No active bleeding, on heparin for PE, will switch to oral AC after renal biopsy procedure.  7/8/2024 Patient was transfused 1 unit of blood during hemodialysis. Hemoglobin 8.8, hematocrit 27.1  S/P 3 units packed RBCs  Start IV Venofer 100 mg with each dialysis treatment for total 10 doses starting 7/10/24     Plan:  Hemoglobin stable  Transfuse for Hgb <8.0    Hyponatremia  Assessment & Plan  Lab Results   Component Value Date/Time    SODIUM 135 07/24/2024 06:04 AM     Likely due to fluid overload. Sodium improved today.     Plan:  Per nephro, fluid restriction to 1.5 L/day  Continue with hemodialysis    ANGY (obstructive sleep apnea)  Assessment & Plan  Overnight pulse oximetry obtained: highest O2 sat 99%, his lowest O2 sat 73%, with  a mean of 91%. Patient had 120 desaturation events <89%.  He spent an 1 hour and 15 minutes in desaturation (~15.82%) and and 1 hour and 34 minutes with desaturation <89% (~19.77%).  Counseled patient on importance of maintaining oxygen saturation as oxygen deprivation can lead to hypertension, heart failure, coronary artery disease, pulmonary hypertension, etc.    Patient was advised of need to wear oxygen at night upon discharge. He would like to wait for formal evaluation via sleep study as an outpatient, but agrees to wear supplemental oxygen currently.    Chronic acquired lymphedema  Assessment & Plan  Chronic LLE lymphedema x 3 years after surgical resection of LLE cyst    Plan:  Advised compression and elevation of lower extremity for symptomatic edema relief    Acute respiratory failure with hypoxia (HCC)-resolved as of 7/6/2024  Assessment & Plan  Patient O2 saturation on admission was 83% on RA  Was placed on 3/L Nasal canula FiO2 32%  CTA showed right lower lobe subsegmental PE  Was placed on heparin    Plan:  See PE  Continue supplementary oxygen to maintain o2 sat >90%  Incentive Spirometry           VTE Pharmacologic Prophylaxis: VTE Score: 7 High Risk (Score >/= 5) - Pharmacological DVT Prophylaxis Ordered: heparin drip. Sequential Compression Devices Ordered.    Mobility:   Basic Mobility Inpatient Raw Score: 24  JH-HLM Goal: 8: Walk 250 feet or more  JH-HLM Achieved: 7: Walk 25 feet or more  JH-HLM Goal NOT achieved. Continue with multidisciplinary rounding and encourage appropriate mobility to improve upon JH-HLM goals.    Patient Centered Rounds: I performed bedside rounds with nursing staff today.   Discussions with Specialists or Other Care Team Provider: cardiology, nephrology    Education and Discussions with Family / Patient: Patient declined call to .     Current Length of Stay: 20 day(s)  Current Patient Status: Inpatient   Discharge Plan: Anticipate discharge in 48-72 hrs to  home.    Code Status: Level 1 - Full Code    Subjective:  Patient was seen and evaluated at bedside.  No overnight events. He continues to endorse pain at left kidney biopsy site. He is without complaints this morning. Denies chest pain, shortness of breath, vomiting, neck pain, any other complaints at this time. Informed him of subtherapeutic INR, and current plan to stay until level is therapeutic.  He understands and agrees.    Objective:     Vitals:   Temp (24hrs), Av.8 °F (36.6 °C), Min:97.5 °F (36.4 °C), Max:98 °F (36.7 °C)    Temp:  [97.5 °F (36.4 °C)-98 °F (36.7 °C)] 98 °F (36.7 °C)  HR:  [52-78] 64  Resp:  [16-17] 16  BP: (118-162)/(67-83) 145/80  SpO2:  [91 %-97 %] 91 %  Body mass index is 58.7 kg/m².     Input and Output Summary (last 24 hours):     Intake/Output Summary (Last 24 hours) at 2024 1434  Last data filed at 2024 1400  Gross per 24 hour   Intake 1160 ml   Output 3800 ml   Net -2640 ml       Physical Exam:   Physical Exam  Vitals and nursing note reviewed.   Constitutional:       General: He is not in acute distress.     Appearance: He is obese. He is not ill-appearing.   HENT:      Head: Normocephalic and atraumatic.      Right Ear: External ear normal.      Left Ear: External ear normal.      Nose: Nose normal.      Mouth/Throat:      Mouth: Mucous membranes are moist.      Pharynx: Oropharynx is clear.   Eyes:      Extraocular Movements: Extraocular movements intact.      Conjunctiva/sclera: Conjunctivae normal.   Cardiovascular:      Rate and Rhythm: Normal rate and regular rhythm.      Pulses: Normal pulses.      Heart sounds: Normal heart sounds. No murmur heard.     No gallop.   Pulmonary:      Effort: Pulmonary effort is normal. No respiratory distress.      Breath sounds: Normal breath sounds. No wheezing.   Abdominal:      General: Bowel sounds are normal. There is no distension.      Palpations: Abdomen is soft.      Tenderness: There is no abdominal tenderness.       Comments: Lymphedema noted to the left lower pannus  Skin under pannus appears irritated with some areas of maceration.  There is also a small draining wound present.   Musculoskeletal:         General: Normal range of motion.      Cervical back: Normal range of motion and neck supple. No rigidity.      Right lower leg: Edema (violaceous skin changes right lower extremity with healing punctate wounds over shin) present.      Left lower leg: Edema (lymphedema) present.      Comments: Bandage noted to left back. No surrounding swelling, discoloration, or signs of bleeding noted.  LLE significantly larger than RLE due to chronic lymphedema. Healed surgical incision noted to left calf.    Skin:     General: Skin is warm and dry.      Comments: R IJV PermCath site clear. Dressing intact.   Neurological:      General: No focal deficit present.      Mental Status: He is alert and oriented to person, place, and time. Mental status is at baseline.   Psychiatric:         Mood and Affect: Mood normal.          Additional Data:     Labs:  Results from last 7 days   Lab Units 07/22/24  0511   WBC Thousand/uL 5.69   HEMOGLOBIN g/dL 9.2*   HEMATOCRIT % 29.2*   PLATELETS Thousands/uL 184     Results from last 7 days   Lab Units 07/24/24  0604   SODIUM mmol/L 135   POTASSIUM mmol/L 5.0   CHLORIDE mmol/L 98   CO2 mmol/L 29   BUN mg/dL 41*   CREATININE mg/dL 9.46*   ANION GAP mmol/L 8   CALCIUM mg/dL 9.3   ALBUMIN g/dL 3.6   TOTAL BILIRUBIN mg/dL 0.29   ALK PHOS U/L 64   ALT U/L 6*   AST U/L 8*   GLUCOSE RANDOM mg/dL 80     Results from last 7 days   Lab Units 07/24/24  0604   INR  1.45*       Results from last 7 days   Lab Units 07/24/24  1128 07/24/24  0732 07/23/24  2157 07/23/24  1549 07/23/24  1117 07/23/24  0801 07/22/24  2111 07/22/24  1639 07/22/24  1051 07/22/24  0756 07/21/24  2132 07/21/24  1614   POC GLUCOSE mg/dl 108 78 89 112 83 80 99 128 119 78 116 86               Lines/Drains:  Invasive Devices       Peripheral  Intravenous Line  Duration             Peripheral IV 07/21/24 Right;Ventral (anterior) Forearm 3 days              Hemodialysis Catheter  Duration             HD Permanent Double Catheter 39 days                    Imaging: Reviewed radiology reports from this admission including: chest xray, abdominal/pelvic CT, and ECHO      CT abdomen pelvis wo contrast   Final Result by Cam Rdz MD (07/20 0936)      Given limitations without IV contrast, no perinephric hematoma identified.      Workstation performed: [id number]         IR biopsy kidney random   Final Result by Stuart Nation MD (07/22 0808)   Impression: Successful percutaneous 18-gauge core biopsy of the left kidney yielding a specimen adequate for [facility] kit evaluation. A full pathology report will follow.                  Workstation performed: [id number]         XR chest portable   Final Result by Danyell Ford MD (07/15 1655)      No acute cardiopulmonary disease.            Workstation performed: [id number]         IR biopsy kidney random   Final Result by Radames Liu MD (07/12 7643)   CT-guided random kidney biopsy canceled due to inability to optimize blood pressure versus respiratory status.      Plan:   Will need repeat attempt at CT-guided biopsy with anesthesiology assistance, likely general anesthesia.      Workstation performed: [id number]         CTA ED chest PE Study   Final Result by Gurjit Gibbons MD (07/04 2003)      Subsegmental right lower lobe pulmonary embolism.   No CT evidence of right heart strain.      Diffuse patchy groundglass opacities consistent with infectious/inflammatory infiltrate.      Findings were discussed with Dr. Monique Rothman MD 8:00 p.m. on 7/4/2024            Workstation performed: [id number]         XR chest 1 view portable   Final Result by Danyell Ford MD (07/04 1707)      Mild pulmonary venous congestion.            Workstation performed: [id number]            Recent Cultures (last 7 days):         Last 24 Hours Medication List:   Current Facility-Administered Medications   Medication Dose Route Frequency Provider Last Rate    acetaminophen  975 mg Oral Q8H [facility] Marsha Martin DO      atorvastatin  80 mg Oral Daily Mily Martin MD      carvedilol  6.25 mg Oral BID With Meals Andrea Longoria MD      heparin (porcine)  3-30 Units/kg/hr (Order-Specific) Intravenous Titrated Job Gutierrez MD 23 Units/kg/hr (07/24/24 1348)    heparin (porcine)  10,000 Units Intravenous Q6H PRN Job Gutierrez MD      heparin (porcine)  5,000 Units Intravenous Q6H PRN Job Gutierrez MD      HYDROmorphone  0.5 mg Intravenous Q3H PRN Cary Wilkinson MD      iron sucrose  100 mg Intravenous After Dialysis Johnny Viramontes MD Stopped (07/22/24 1207)    isosorbide mononitrate  60 mg Oral Daily Mily Martin MD      lidocaine  3 patch Topical Daily Cary Wilkinson MD      melatonin  3 mg Oral HS Cary Wilkinson MD      naloxone  0.04 mg Intravenous Q1MIN PRN Cary Wilkinson MD      NIFEdipine  30 mg Oral Daily Joselyn Reyes Bahamonde, MD      nystatin   Topical BID Lorri Maria MD      ondansetron  4 mg Intravenous Once Cindy Balderas MD      ondansetron  4 mg Intravenous Q8H PRN Cam Hills MD      oxyCODONE  10 mg Oral Q4H PRN Cary Wilkinson MD      oxyCODONE  5 mg Oral Q4H PRN Cary Wilkinson MD      polyethylene glycol  17 g Oral Daily PRN Diane Oliveira MD      sevelamer  800 mg Oral TID With Meals Mily Martin MD      torsemide  100 mg Oral Daily Charles Spear MD      warfarin  12.5 mg Oral Daily (warfarin) Lorri Maria MD          Today, Patient Was Seen By: Deya Palomino MD    **Please Note: This note may have been constructed using a voice recognition system.**

## 2024-07-25 LAB
APTT PPP: 71 SECONDS (ref 23–37)
APTT PPP: 89 SECONDS (ref 23–37)
APTT PPP: 91 SECONDS (ref 23–37)
B19V IGG SER IA-ACNC: 0.2 INDEX (ref 0–0.8)
B19V IGM SER IA-ACNC: 0.2 INDEX (ref 0–0.8)
GLUCOSE SERPL-MCNC: 85 MG/DL (ref 65–140)
GLUCOSE SERPL-MCNC: 86 MG/DL (ref 65–140)
GLUCOSE SERPL-MCNC: 90 MG/DL (ref 65–140)
GLUCOSE SERPL-MCNC: 97 MG/DL (ref 65–140)
INR PPP: 1.85 (ref 0.84–1.19)
PROTHROMBIN TIME: 22.2 SECONDS (ref 11.6–14.5)

## 2024-07-25 PROCEDURE — 99232 SBSQ HOSP IP/OBS MODERATE 35: CPT | Performed by: STUDENT IN AN ORGANIZED HEALTH CARE EDUCATION/TRAINING PROGRAM

## 2024-07-25 PROCEDURE — 82948 REAGENT STRIP/BLOOD GLUCOSE: CPT

## 2024-07-25 PROCEDURE — 85730 THROMBOPLASTIN TIME PARTIAL: CPT | Performed by: HOSPITALIST

## 2024-07-25 PROCEDURE — 99232 SBSQ HOSP IP/OBS MODERATE 35: CPT | Performed by: HOSPITALIST

## 2024-07-25 PROCEDURE — 85610 PROTHROMBIN TIME: CPT

## 2024-07-25 RX ADMIN — HEPARIN SODIUM 21 UNITS/KG/HR: 10000 INJECTION, SOLUTION INTRAVENOUS at 17:12

## 2024-07-25 RX ADMIN — CARVEDILOL 6.25 MG: 6.25 TABLET, FILM COATED ORAL at 17:16

## 2024-07-25 RX ADMIN — SEVELAMER HYDROCHLORIDE 800 MG: 800 TABLET ORAL at 12:26

## 2024-07-25 RX ADMIN — ISOSORBIDE MONONITRATE 60 MG: 60 TABLET, EXTENDED RELEASE ORAL at 09:47

## 2024-07-25 RX ADMIN — ATORVASTATIN CALCIUM 80 MG: 40 TABLET, FILM COATED ORAL at 09:47

## 2024-07-25 RX ADMIN — HEPARIN SODIUM 21 UNITS/KG/HR: 10000 INJECTION, SOLUTION INTRAVENOUS at 07:25

## 2024-07-25 RX ADMIN — NIFEDIPINE 30 MG: 30 TABLET, FILM COATED, EXTENDED RELEASE ORAL at 09:47

## 2024-07-25 RX ADMIN — SEVELAMER HYDROCHLORIDE 800 MG: 800 TABLET ORAL at 17:14

## 2024-07-25 RX ADMIN — CARVEDILOL 6.25 MG: 6.25 TABLET, FILM COATED ORAL at 07:25

## 2024-07-25 RX ADMIN — WARFARIN SODIUM 12.5 MG: 7.5 TABLET ORAL at 17:13

## 2024-07-25 RX ADMIN — SEVELAMER HYDROCHLORIDE 800 MG: 800 TABLET ORAL at 07:25

## 2024-07-25 RX ADMIN — TORSEMIDE 100 MG: 100 TABLET ORAL at 09:47

## 2024-07-25 NOTE — PROGRESS NOTES
[facility]  Progress Note  Name: Joaquin Frankel I  MRN: [id number]  Unit/Bed#: S [place]-01 I Date of Admission: 7/4/2024   Date of Service: 7/25/2024 I Hospital Day: 21    Assessment & Plan   * Pulmonary embolism (HCC)  Assessment & Plan  Patient presented for worsening chest pain, shortness of breath, and dyspnea on exertion.  Patient was admitted for similar chest pain in early June and was found to have ESRD, started on HD MWF   D-Dimer 4.21  CTA showed subsegmental right lower lobe pulmonary embolism  7/12/24 Heparin was subtherapeutic this morning, PTT 55. No bolus was given or increase in gtt as patient will have the gtt stopped at 10 am pending IR procedure.  Patient has periods of hypoxia in the 80s supplemental oxygen encouraged.    Plan:  On IV heparin, bridging to warfarin. Continue warfarin 12.5 mg tonight.  Goal INR 2-3  Monitor respiratory status, maintain o2 sat above 90% with supplementary O2 as needed    ESRD (end stage renal disease) on dialysis (HCC)  Assessment & Plan  Lab Results   Component Value Date    EGFR 6 07/24/2024    EGFR 7 07/23/2024    EGFR 5 07/22/2024    CREATININE 9.46 (H) 07/24/2024    CREATININE 8.08 (H) 07/23/2024    CREATININE 10.90 (H) 07/22/2024     Patient ESRD on Dialysis 3 times a week, MWF  Elevated , c/o of orthopnea   Etiology of CLOVIS: Etiology not entirely clear - Possible MGRS? Pattern of disease progression is not typical for diabetic nephropathy.   Etiology of CKD: Suspected due to diabetic nephropathy, hypertensive nephrosclerosis, obesity related renal dysfunction  Previous baseline creatinine 1.3-1.6 w/ prior episodes of CLOVIS  Serologic workup negative except for IgG kappa on SPEP and UPEP.  Cryoglobulin negative, ANCA negative, anti-dsDNA negative, anti-GBM negative, JOSE ROBERTO negative, C4 34, C3 118  Bone marrow biopsy 6/12/2024: negative for plasma cell neoplasm.   CRRT initiated 6/7/2024. Peak creatinine 10.4  7/12 renal  biopsy was not performed due to hypertension despite sedation and antihypertensive medications. Rescheduled for Monday 7/15/24  7/15 renal biopsy was not completed due to oxygen saturation of 76-89% during dialysis.  Likely due to ANGY/OHS.  Agreed to wear oxygen via nasal cannula.  7/19 L renal biopsy performed. 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 No evidence of MGRS on biopsy     Plan:  Continue HD per nephrology  Per nephrology, continue Nifedipine 30 mg daily, carvedilol 6.25 mg BID, Imdur 60 mg daily, torsemide 100 mg daily   Monitor I/O, Daily weight      Hypertension  Assessment & Plan  History of HTN, recent medication change after dx of ESRD  Workup at that time notable for IgG kappa monoclonal gammopathy and s/p bone marrow biopsy on 6/12/2023 to assess for multiple myeloma. Bone marrow biopsy with no evidence of plasma cell neoplasm.   HD on MWF schedule.   Presented to ED with uncontrolled /80. Endorsed headaches and blurry vision in his left eye in the ED that has since resolved.      Plan:  Continue nifedipine 30 mg, carvedilol 6.25 mg twice daily, Imdur 60 mg once daily, torsemide 100 mg daily  Hold carvedilol if HR <50  Nephrology onbooard   Continue UF with dialysis as BP tolerates     Chest pain  Assessment & Plan  Patient presented with worsening substernal chest pain over several weeks.  H/o chronic angina, CAD, family history of hereditary heart disease on fathers side.   Patient was admitted for similar chest pain in early June and was found to have ESRD, started on HD MWF.  Recent nuclear stress test without significant reversible ischemia  EKG on admission: Sinus Rhythm with PAC's. Repeat EKG showed sinus bradycardia  High-sensitivity troponin negative x 5   . D-Dimer 4.21  CTA: subsegmental right lower lobe pulmonary embolism  07/05/24: Updated echocardiogram on unremarkable  CP Multifactorial from acute on chronic  anemia, hypertensive urgency, and acute PE   Patient's chest pain has resolved following hemodialysis and the improvement of high blood pressure.   7/9: Telemetry discontinued    Plan-  Continue nifedipine 30 mg, Coreg 6.25 mg BID, Imdur 60 mg daily, atorvastatin 80 mg daily.     Type 2 diabetes mellitus (HCC)  Assessment & Plan  Lab Results   Component Value Date    HGBA1C 5.7 (H) 06/09/2024       Recent Labs     07/24/24  1558 07/24/24  1956 07/25/24  0737 07/25/24  1113   POCGLU 112 118 85 97       Blood Sugar Average: Last 72 hrs:  (P) 99    SSI  Hypoglycemia protocol      Anemia  Assessment & Plan    Hemoglobin   Date Value Ref Range Status   07/22/2024 9.2 (L) 12.0 - 17.0 g/dL Final   01/23/2017 14.5 12.6 - 17.7 g/dL Final     Hematocrit   Date Value Ref Range Status   07/22/2024 29.2 (L) 36.5 - 49.3 % Final   01/23/2017 43.8 37.5 - 51.0 % Final       Patient has history of anemia of chronic disease, ESRD  CBC on admission showed hgb 6.6 / hct 21.2  Received 2 units packed RBC's in ED. Hbg 7.6 after transfusion  Hematology following with outpatient f/u scheduled 7/11/2024, patient will reschedule due to overlapping hospital admission   On oral iron twice daily  No active bleeding, on heparin for PE, will switch to oral AC after renal biopsy procedure.  7/8/2024 Patient was transfused 1 unit of blood during hemodialysis. Hemoglobin 8.8, hematocrit 27.1  S/P 3 units packed RBCs  Start IV Venofer 100 mg with each dialysis treatment for total 10 doses starting 7/10/24     Plan:  Hemoglobin stable  Transfuse for Hgb <8.0    Hyponatremia  Assessment & Plan  Lab Results   Component Value Date/Time    SODIUM 135 07/24/2024 06:04 AM     Likely due to fluid overload. Sodium improved today.     Plan:  Per nephro, fluid restriction to 1.5 L/day  Continue with hemodialysis    ANGY (obstructive sleep apnea)  Assessment & Plan  Overnight pulse oximetry obtained: highest O2 sat 99%, his lowest O2 sat 73%, with a mean of 91%.  Patient had 120 desaturation events <89%.  He spent an 1 hour and 15 minutes in desaturation (~15.82%) and and 1 hour and 34 minutes with desaturation <89% (~19.77%).  Counseled patient on importance of maintaining oxygen saturation as oxygen deprivation can lead to hypertension, heart failure, coronary artery disease, pulmonary hypertension, etc.    Patient was advised of need to wear oxygen at night upon discharge. He would like to wait for formal evaluation via sleep study as an outpatient, but agrees to wear supplemental oxygen currently.    Chronic acquired lymphedema  Assessment & Plan  Chronic LLE lymphedema x 3 years after surgical resection of LLE cyst    Plan:  Advised compression and elevation of lower extremity for symptomatic edema relief    Acute respiratory failure with hypoxia (HCC)-resolved as of 7/6/2024  Assessment & Plan  Patient O2 saturation on admission was 83% on RA  Was placed on 3/L Nasal canula FiO2 32%  CTA showed right lower lobe subsegmental PE  Was placed on heparin    Plan:  See PE  Continue supplementary oxygen to maintain o2 sat >90%  Incentive Spirometry           VTE Pharmacologic Prophylaxis: VTE Score: 7 High Risk (Score >/= 5) - Pharmacological DVT Prophylaxis Ordered: heparin drip. Sequential Compression Devices Ordered.    Mobility:   Basic Mobility Inpatient Raw Score: 24  JH-HLM Goal: 8: Walk 250 feet or more  JH-HLM Achieved: 4: Move to chair/commode  JH-HLM Goal NOT achieved. Continue with multidisciplinary rounding and encourage appropriate mobility to improve upon JH-HLM goals.    Patient Centered Rounds: I performed bedside rounds with nursing staff today.   Discussions with Specialists or Other Care Team Provider: cardiology, nephrology    Education and Discussions with Family / Patient: Patient declined call to .     Current Length of Stay: 21 day(s)  Current Patient Status: Inpatient   Discharge Plan: Anticipate discharge in 48-72 hrs to home.    Code  Status: Level 1 - Full Code    Subjective:  Patient was seen and evaluated at bedside. No overnight events. He states pain at left kidney biopsy site is better this morning. He reports some abdominal discomfort after eating breakfast, but is otherwise without complaints. Denies chest pain, shortness of breath, vomiting, neck pain. Informed him of subtherapeutic INR, and current plan to stay until level is therapeutic.  He understands and agrees.    Objective:     Vitals:   Temp (24hrs), Av °F (36.7 °C), Min:97.7 °F (36.5 °C), Max:98.2 °F (36.8 °C)    Temp:  [97.7 °F (36.5 °C)-98.2 °F (36.8 °C)] 97.9 °F (36.6 °C)  HR:  [51-64] 61  Resp:  [16-18] 18  BP: (133-150)/(78-86) 150/81  SpO2:  [92 %] 92 %  Body mass index is 58.15 kg/m².     Input and Output Summary (last 24 hours):     Intake/Output Summary (Last 24 hours) at 2024 1130  Last data filed at 2024 0921  Gross per 24 hour   Intake 1080 ml   Output 3800 ml   Net -2720 ml       Physical Exam:   Physical Exam  Vitals and nursing note reviewed.   Constitutional:       General: He is not in acute distress.     Appearance: He is obese. He is not ill-appearing.   HENT:      Head: Normocephalic and atraumatic.      Right Ear: External ear normal.      Left Ear: External ear normal.      Nose: Nose normal.      Mouth/Throat:      Mouth: Mucous membranes are moist.      Pharynx: Oropharynx is clear.   Eyes:      Extraocular Movements: Extraocular movements intact.      Conjunctiva/sclera: Conjunctivae normal.   Cardiovascular:      Rate and Rhythm: Normal rate and regular rhythm.      Pulses: Normal pulses.      Heart sounds: Normal heart sounds. No murmur heard.     No gallop.   Pulmonary:      Effort: Pulmonary effort is normal. No respiratory distress.      Breath sounds: Normal breath sounds. No wheezing.   Abdominal:      General: Bowel sounds are normal. There is no distension.      Palpations: Abdomen is soft.      Tenderness: There is no abdominal  tenderness.      Comments: Lymphedema noted to the left lower pannus  Skin under pannus appears irritated with some areas of maceration.  There is also a small draining wound present.   Musculoskeletal:         General: Normal range of motion.      Cervical back: Normal range of motion and neck supple. No rigidity.      Right lower leg: Edema (violaceous skin changes right lower extremity with healing punctate wounds over shin) present.      Left lower leg: Edema (lymphedema) present.      Comments: Bandage noted to left back. No surrounding swelling, discoloration, or signs of bleeding noted.  LLE significantly larger than RLE due to chronic lymphedema. Healed surgical incision noted to left calf.    Skin:     General: Skin is warm and dry.      Comments: R IJV PermCath site clear. Dressing intact.   Neurological:      General: No focal deficit present.      Mental Status: He is alert and oriented to person, place, and time. Mental status is at baseline.   Psychiatric:         Mood and Affect: Mood normal.          Additional Data:     Labs:  Results from last 7 days   Lab Units 07/22/24  0511   WBC Thousand/uL 5.69   HEMOGLOBIN g/dL 9.2*   HEMATOCRIT % 29.2*   PLATELETS Thousands/uL 184     Results from last 7 days   Lab Units 07/24/24  0604   SODIUM mmol/L 135   POTASSIUM mmol/L 5.0   CHLORIDE mmol/L 98   CO2 mmol/L 29   BUN mg/dL 41*   CREATININE mg/dL 9.46*   ANION GAP mmol/L 8   CALCIUM mg/dL 9.3   ALBUMIN g/dL 3.6   TOTAL BILIRUBIN mg/dL 0.29   ALK PHOS U/L 64   ALT U/L 6*   AST U/L 8*   GLUCOSE RANDOM mg/dL 80     Results from last 7 days   Lab Units 07/25/24  0711   INR  1.85*       Results from last 7 days   Lab Units 07/25/24  1113 07/25/24  0737 07/24/24  1956 07/24/24  1558 07/24/24  1128 07/24/24  0732 07/23/24  2157 07/23/24  1549 07/23/24  1117 07/23/24  0801 07/22/24  2111 07/22/24  1639   POC GLUCOSE mg/dl 97 85 118 112 108 78 89 112 83 80 99 128               Lines/Drains:  Invasive Devices        Peripheral Intravenous Line  Duration             Peripheral IV 07/21/24 Right;Ventral (anterior) Forearm 4 days              Hemodialysis Catheter  Duration             HD Permanent Double Catheter 40 days                    Imaging: Reviewed radiology reports from this admission including: chest xray, abdominal/pelvic CT, and ECHO      CT abdomen pelvis wo contrast   Final Result by Cam Rdz MD (07/20 0936)      Given limitations without IV contrast, no perinephric hematoma identified.      Workstation performed: [id number]         IR biopsy kidney random   Final Result by Stuart Nation MD (07/22 0808)   Impression: Successful percutaneous 18-gauge core biopsy of the left kidney yielding a specimen adequate for [facility] kit evaluation. A full pathology report will follow.                  Workstation performed: [id number]         XR chest portable   Final Result by Danyell Ford MD (07/15 1655)      No acute cardiopulmonary disease.            Workstation performed: [id number]         IR biopsy kidney random   Final Result by Radames Liu MD (07/12 4083)   CT-guided random kidney biopsy canceled due to inability to optimize blood pressure versus respiratory status.      Plan:   Will need repeat attempt at CT-guided biopsy with anesthesiology assistance, likely general anesthesia.      Workstation performed: [id number]         CTA ED chest PE Study   Final Result by Gurjit Gibbons MD (07/04 2003)      Subsegmental right lower lobe pulmonary embolism.   No CT evidence of right heart strain.      Diffuse patchy groundglass opacities consistent with infectious/inflammatory infiltrate.      Findings were discussed with Dr. Monique Rothman MD 8:00 p.m. on 7/4/2024            Workstation performed: [id number]         XR chest 1 view portable   Final Result by Danyell Ford MD (07/04 5167)      Mild pulmonary venous congestion.            Workstation performed:  [id number]           Recent Cultures (last 7 days):         Last 24 Hours Medication List:   Current Facility-Administered Medications   Medication Dose Route Frequency Provider Last Rate    acetaminophen  975 mg Oral Q8H [facility] Marsha Martin DO      atorvastatin  80 mg Oral Daily Mily Martin MD      carvedilol  6.25 mg Oral BID With Meals Andrea Longoria MD      heparin (porcine)  3-30 Units/kg/hr (Order-Specific) Intravenous Titrated Job Gutierrez MD 21 Units/kg/hr (07/25/24 0946)    heparin (porcine)  10,000 Units Intravenous Q6H PRN Job Gutierrez MD      heparin (porcine)  5,000 Units Intravenous Q6H PRN Job Gutierrez MD      HYDROmorphone  0.5 mg Intravenous Q3H PRN Cary Wilkinson MD      iron sucrose  100 mg Intravenous After Dialysis Johnny Viramontes MD Stopped (07/22/24 1207)    isosorbide mononitrate  60 mg Oral Daily Mily Martin MD      lidocaine  3 patch Topical Daily Cary Wilkinson MD      melatonin  3 mg Oral HS Cary Wilkinson MD      naloxone  0.04 mg Intravenous Q1MIN PRN Cary Wilkinson MD      NIFEdipine  30 mg Oral Daily Joselyn Reyes Bahamonde, MD      nystatin   Topical BID Lorri Maria MD      ondansetron  4 mg Intravenous Once Cindy Balderas MD      ondansetron  4 mg Intravenous Q8H PRN Cam Hills MD      oxyCODONE  10 mg Oral Q4H PRN Cary Wilkinson MD      oxyCODONE  5 mg Oral Q4H PRN Cary Wilkinson MD      polyethylene glycol  17 g Oral Daily PRN Diane Oliveira MD      sevelamer  800 mg Oral TID With Meals Mily Martin MD      torsemide  100 mg Oral Daily Charles Spear MD      warfarin  12.5 mg Oral Daily (warfarin) Lorri Maria MD          Today, Patient Was Seen By: Deya Palomino MD    **Please Note: This note may have been constructed using a voice recognition system.**

## 2024-07-25 NOTE — CASE MANAGEMENT
Case Management Progress Note    Patient name Joaquin Frankel  Location S /S -01 MRN [id number]  : 1980 Date 2024       LOS (days): 21  Geometric Mean LOS (GMLOS) (days): 4  Days to GMLOS:-16.6        OBJECTIVE:        Current admission status: Inpatient  Preferred Pharmacy:   [id number] - [place], [place] - [address] [address]  [address] [place] [address]  Phone: [phone] Fax: [phone]    [facility] [address] [facility] [address]  [address], [address] [address]  [facility] [address]  Phone: [phone] Fax: [phone]    Primary Care Provider: Hany Mcfarland DO    Primary Insurance: BLUE CROSS  Secondary Insurance:     PROGRESS NOTE:  Patient discussed in LOS meeting held yesterday.  Patient's dc is a medical hold due to INR/coumadin bridging.  Assigned CM following for any post hospital needs, which are not anticipated at this time.

## 2024-07-25 NOTE — PROGRESS NOTES
NEPHROLOGY HOSPITAL PROGRESS NOTE   Joaquin Frankel 44 y.o. male MRN: [id number]  Unit/Bed#: S -01 Encounter: [id number]  Reason for Consult: Acute kidney injury    ASSESSMENT and PLAN:  44-year-old male with a history of diabetes mellitus, hypertension, CAD, monoclonal gammopathy, morbid obesity, left lower extremity lymphedema who presented with shortness of breath.  Nephrology consulted for CLOVIS.    Acute kidney injury:  Etiology:   Renal biopsy report shows primarily diabetic nephropathy.  Also a collapsing variant of FSGS.  Unlikely recovery.  Severe fibrosis.  Workup:  UACR 5288 mg/G  Urinalysis with 3+ protein, 10-20 RBCs  CT without contrast: Kidneys unremarkable.  No hydronephrosis.  No evidence of hematoma  Required initiation of hemodialysis.  Treatment schedule Monday, Wednesday, Friday  Outpatient placement established at [facility] which is where patient works.  He has a plan for dialysis after work  Plan:   Next hemodialysis treatment Friday 7/26.  Encourage Joaquin to get listed for renal transplant    Access: Tunneled dialysis catheter placed on 6/14    Electrolytes/acid-base: Acceptable    Mild hyponatremia:  Volume mediated.   Volume removal as tolerated on dialysis  Continue diuretic    Hypertension/volume status:  Hypervolemic with lower extremity edema/severe left lower extremity lymphedema  Current medications: Nifedipine 30 mg daily, carvedilol 6.25 mg twice a day.  Isosorbide mononitrate 60 mg daily.  Diuretic: on torsemide 100 mg daily  Volume removal on hemodialysis as tolerated  Blood pressure acceptable.  No changes    CKD-MBD:  On sevelamer for control of hyperphosphatemia  Outpatient monitoring and hemodialysis unit after discharge    Anemia of chronic disease  Etiology: CKD, monoclonal gammopathy  Hemoglobin below goal.  Goal hemoglobin 10-11.  Monitor  No JOSSELYN due to monoclonal gammopathy  Try to avoid transfusion.  If transfusion is needed administer leukoreduced CMV  negative blood    Monoclonal gammopathy  Status post bone marrow biopsy  Management per oncology    Pulmonary embolism:  On heparin infusion      PLAN SUMMARY:  Continue hemodialysis: MWF  Transplant eval planned    SUBJECTIVE / 24H INTERVAL HISTORY:  No complaints at this time.  No overnight events reported.    OBJECTIVE:  Current Weight: Weight - Scale: (!) 168 kg (371 lb 4.1 oz)  Vitals:    07/24/24 1557 07/24/24 2230 07/25/24 0600 07/25/24 0717   BP: 140/80 149/86  150/81   BP Location: Left arm      Pulse: (!) 51 59  61   Resp: 16   18   Temp: 98.2 °F (36.8 °C) 97.7 °F (36.5 °C)  97.9 °F (36.6 °C)   TempSrc: Oral      SpO2: 92% 92%     Weight:   (!) 168 kg (371 lb 4.1 oz)    Height:           Intake/Output Summary (Last 24 hours) at 7/25/2024 1244  Last data filed at 7/25/2024 1229  Gross per 24 hour   Intake 980 ml   Output 1800 ml   Net -820 ml   General: NAD  Skin: no rash  Eyes: anicteric sclera  ENT: moist mucous membrane  Neck: supple  Chest: CTA b/l, no ronchii, no wheeze, no rubs, no rales.  Normal effort  CVS: s1s2, no murmur, no gallop, no rub.RRR  Abdomen: soft, nontender, nl sounds  Extremities: + edema LE b/l.  Severe lymphedema left lower extremity  : no lewis  Neuro: AAOX3  Psych: normal affect   Medications:    Current Facility-Administered Medications:     acetaminophen (TYLENOL) tablet 975 mg, 975 mg, Oral, [facility], Marsha Martin DO, 975 mg at 07/24/24 2216    atorvastatin (LIPITOR) tablet 80 mg, 80 mg, Oral, Daily, Mily Martin MD, 80 mg at 07/25/24 0947    carvedilol (COREG) tablet 6.25 mg, 6.25 mg, Oral, BID With Meals, Andrea Longoria MD, 6.25 mg at 07/25/24 0725    heparin (porcine) 25,000 units in 0.45% NaCl 250 mL infusion (premix), 3-30 Units/kg/hr (Order-Specific), Intravenous, Titrated, Job Gutierrez MD, Last Rate: 26.3 mL/hr at 07/25/24 0946, 21 Units/kg/hr at 07/25/24 0946    heparin (porcine) injection 10,000 Units, 10,000 Units, Intravenous, Q6H PRN, Job  MD Brenda, 10,000 Units at 07/23/24 0842    heparin (porcine) injection 5,000 Units, 5,000 Units, Intravenous, Q6H PRN, Job Gutierrez MD, 5,000 Units at 07/21/24 1006    HYDROmorphone (DILAUDID) injection 0.5 mg, 0.5 mg, Intravenous, Q3H PRN, Cary Wilkinson MD, 0.5 mg at 07/24/24 1832    iron sucrose (VENOFER) 100 mg in sodium chloride 0.9 % 100 mL IVPB, 100 mg, Intravenous, After Dialysis, Johnny Viramontes MD, Stopped at 07/22/24 1207    isosorbide mononitrate (IMDUR) 24 hr tablet 60 mg, 60 mg, Oral, Daily, Mily Martin MD, 60 mg at 07/25/24 0947    lidocaine (LIDODERM) 5 % patch 3 patch, 3 patch, Topical, Daily, Cary Wilkinson MD    melatonin tablet 3 mg, 3 mg, Oral, HS, Cary Wilkinson MD, 3 mg at 07/24/24 2216    naloxone (NARCAN) 0.04 mg/mL syringe 0.04 mg, 0.04 mg, Intravenous, Q1MIN PRN, Cary Wilkinson MD    NIFEdipine (PROCARDIA XL) 24 hr tablet 30 mg, 30 mg, Oral, Daily, Joselyn Reyes Bahamonde, MD, 30 mg at 07/25/24 0947    nystatin (MYCOSTATIN) powder, , Topical, BID, Lorri Maria MD, Given at 07/23/24 1711    ondansetron (ZOFRAN) injection 4 mg, 4 mg, Intravenous, Once, Cindy Balderas MD    ondansetron (ZOFRAN) injection 4 mg, 4 mg, Intravenous, Q8H PRN, Cam Hills MD, 4 mg at 07/23/24 1105    oxyCODONE (ROXICODONE) immediate release tablet 10 mg, 10 mg, Oral, Q4H PRN, Cary Wilkinson MD, 10 mg at 07/24/24 1706    oxyCODONE (ROXICODONE) IR tablet 5 mg, 5 mg, Oral, Q4H PRN, Cary Wilkinson MD, 5 mg at 07/24/24 2221    polyethylene glycol (MIRALAX) packet 17 g, 17 g, Oral, Daily PRN, Diane Oliveira MD, 17 g at 07/24/24 1713    sevelamer (RENAGEL) tablet 800 mg, 800 mg, Oral, TID With Meals, Mily Martin MD, 800 mg at 07/25/24 1226    torsemide (DEMADEX) tablet 100 mg, 100 mg, Oral, Daily, Charles Spear MD, 100 mg at 07/25/24 0947    warfarin (COUMADIN) tablet 12.5 mg, 12.5 mg, Oral, Daily (warfarin), Lorri Maria MD, 12.5 mg at 07/24/24 1706    Laboratory Results:  Results from last 7 days   Lab Units  07/24/24  0604 07/23/24  0558 07/22/24  0511 07/21/24  0907 07/20/24  1020 07/20/24  0538 07/20/24  0536 07/19/24  2020 07/19/24  0613   WBC Thousand/uL  --   --  5.69  --  6.24  --  6.23 6.71 6.12   HEMOGLOBIN g/dL  --   --  9.2*  --  9.2*  --  9.2* 8.9* 9.3*   HEMATOCRIT %  --   --  29.2*  --  29.5*  --  29.7* 28.6* 29.7*   PLATELETS Thousands/uL  --   --  184  --  189  --  185 174 189   POTASSIUM mmol/L 5.0 4.8 5.1 4.7  --  4.9  --   --  5.0   CHLORIDE mmol/L 98 96 96 96  --  96  --   --  96   CO2 mmol/L 29 30 27 29  --  30  --   --  27   BUN mg/dL 41* 32* 50* 42*  --  33*  --   --  41*   CREATININE mg/dL 9.46* 8.08* 10.90* 9.86*  --  8.65*  --   --  9.40*   CALCIUM mg/dL 9.3 9.3 9.2 9.1  --  9.2  --   --  9.3   MAGNESIUM mg/dL  --   --   --   --   --  2.0  --   --  2.0   PHOSPHORUS mg/dL  --   --   --   --   --  5.2*  --   --  5.1*

## 2024-07-25 NOTE — PLAN OF CARE
Problem: Potential for Falls  Goal: Patient will remain free of falls  Description: INTERVENTIONS:  - Educate patient/family on patient safety including physical limitations  - Instruct patient to call for assistance with activity   - Consult OT/PT to assist with strengthening/mobility   - Keep Call bell within reach  - Keep bed low and locked with side rails adjusted as appropriate  - Keep care items and personal belongings within reach  - Initiate and maintain comfort rounds  - Make Fall Risk Sign visible to staff  - Apply yellow socks and bracelet for high fall risk patients  - Consider moving patient to room near nurses station  Outcome: Progressing     Problem: METABOLIC, FLUID AND ELECTROLYTES - ADULT  Goal: Electrolytes maintained within normal limits  Description: INTERVENTIONS:  - Monitor labs and assess patient for signs and symptoms of electrolyte imbalances  - Administer electrolyte replacement as ordered  - Monitor response to electrolyte replacements, including repeat lab results as appropriate  - Instruct patient on fluid and nutrition as appropriate  Outcome: Progressing  Goal: Fluid balance maintained  Description: INTERVENTIONS:  - Monitor labs   - Monitor I/O and WT  - Instruct patient on fluid and nutrition as appropriate  - Assess for signs & symptoms of volume excess or deficit  Outcome: Progressing     Problem: Prexisting or High Potential for Compromised Skin Integrity  Goal: Skin integrity is maintained or improved  Description: INTERVENTIONS:  - Identify patients at risk for skin breakdown  - Assess and monitor skin integrity  - Assess and monitor nutrition and hydration status  - Monitor labs   - Assess for incontinence   - Turn and reposition patient  - Assist with mobility/ambulation  - Relieve pressure over bony prominences  - Avoid friction and shearing  - Provide appropriate hygiene as needed including keeping skin clean and dry  - Evaluate need for skin moisturizer/barrier cream  -  Collaborate with interdisciplinary team   - Patient/family teaching  - Consider wound care consult   Outcome: Progressing     Problem: Nutrition/Hydration-ADULT  Goal: Nutrient/Hydration intake appropriate for improving, restoring or maintaining nutritional needs  Description: Monitor and assess patient's nutrition/hydration status for malnutrition. Collaborate with interdisciplinary team and initiate plan and interventions as ordered.  Monitor patient's weight and dietary intake as ordered or per policy. Utilize nutrition screening tool and intervene as necessary. Determine patient's food preferences and provide high-protein, high-caloric foods as appropriate.     INTERVENTIONS:  - Monitor oral intake, urinary output, labs, and treatment plans  - Assess nutrition and hydration status and recommend course of action  - Evaluate amount of meals eaten  - Assist patient with eating if necessary   - Allow adequate time for meals  - Recommend/ encourage appropriate diets, oral nutritional supplements, and vitamin/mineral supplements  - Order, calculate, and assess calorie counts as needed  - Recommend, monitor, and adjust tube feedings and TPN/PPN based on assessed needs  - Assess need for intravenous fluids  - Provide specific nutrition/hydration education as appropriate  - Include patient/family/caregiver in decisions related to nutrition  Outcome: Progressing     Problem: PAIN - ADULT  Goal: Verbalizes/displays adequate comfort level or baseline comfort level  Description: Interventions:  - Encourage patient to monitor pain and request assistance  - Assess pain using appropriate pain scale  - Administer analgesics based on type and severity of pain and evaluate response  - Implement non-pharmacological measures as appropriate and evaluate response  - Consider cultural and social influences on pain and pain management  - Notify physician/advanced practitioner if interventions unsuccessful or patient reports new  pain  Outcome: Progressing     Problem: INFECTION - ADULT  Goal: Absence or prevention of progression during hospitalization  Description: INTERVENTIONS:  - Assess and monitor for signs and symptoms of infection  - Monitor lab/diagnostic results  - Monitor all insertion sites, i.e. indwelling lines, tubes, and drains  - Monitor endotracheal if appropriate and nasal secretions for changes in amount and color  - [place] appropriate cooling/warming therapies per order  - Administer medications as ordered  - Instruct and encourage patient and family to use good hand hygiene technique  - Identify and instruct in appropriate isolation precautions for identified infection/condition  Outcome: Progressing  Goal: Absence of fever/infection during neutropenic period  Description: INTERVENTIONS:  - Monitor WBC    Outcome: Progressing     Problem: SAFETY ADULT  Goal: Patient will remain free of falls  Description: INTERVENTIONS:  - Educate patient/family on patient safety including physical limitations  - Instruct patient to call for assistance with activity   - Consult OT/PT to assist with strengthening/mobility   - Keep Call bell within reach  - Keep bed low and locked with side rails adjusted as appropriate  - Keep care items and personal belongings within reach  - Initiate and maintain comfort rounds  - Apply yellow socks and bracelet for high fall risk patients  - Consider moving patient to room near nurses station  Outcome: Progressing  Goal: Maintain or return to baseline ADL function  Description: INTERVENTIONS:  -  Assess patient's ability to carry out ADLs; assess patient's baseline for ADL function and identify physical deficits which impact ability to perform ADLs (bathing, care of mouth/teeth, toileting, grooming, dressing, etc.)  - Assess/evaluate cause of self-care deficits   - Assess range of motion  - Assess patient's mobility; develop plan if impaired  - Assess patient's need for assistive devices and provide  as appropriate  - Encourage maximum independence but intervene and supervise when necessary  - Involve family in performance of ADLs  - Assess for home care needs following discharge   - Consider OT consult to assist with ADL evaluation and planning for discharge  - Provide patient education as appropriate  Outcome: Progressing  Goal: Maintains/Returns to pre admission functional level  Description: INTERVENTIONS:  - Perform AM-PAC 6 Click Basic Mobility/ Daily Activity assessment daily.  - Set and communicate daily mobility goal to care team and patient/family/caregiver.   - Collaborate with rehabilitation services on mobility goals if consulted  - Perform Range of Motion   - Out of bed for toileting  - Record patient progress and toleration of activity level   Outcome: Progressing     Problem: DISCHARGE PLANNING  Goal: Discharge to home or other facility with appropriate resources  Description: INTERVENTIONS:  - Identify barriers to discharge w/patient and caregiver  - Arrange for needed discharge resources and transportation as appropriate  - Identify discharge learning needs (meds, wound care, etc.)  - Arrange for interpretive services to assist at discharge as needed  - Refer to Case Management Department for coordinating discharge planning if the patient needs post-hospital services based on physician/advanced practitioner order or complex needs related to functional status, cognitive ability, or social support system  Outcome: Progressing     Problem: Knowledge Deficit  Goal: Patient/family/caregiver demonstrates understanding of disease process, treatment plan, medications, and discharge instructions  Description: Complete learning assessment and assess knowledge base.  Interventions:  - Provide teaching at level of understanding  - Provide teaching via preferred learning methods  Outcome: Progressing

## 2024-07-26 ENCOUNTER — APPOINTMENT (INPATIENT)
Dept: DIALYSIS | Facility: HOSPITAL | Age: 44
DRG: 175 | End: 2024-07-26
Attending: STUDENT IN AN ORGANIZED HEALTH CARE EDUCATION/TRAINING PROGRAM
Payer: COMMERCIAL

## 2024-07-26 VITALS
SYSTOLIC BLOOD PRESSURE: 170 MMHG | BODY MASS INDEX: 49.44 KG/M2 | HEIGHT: 67 IN | RESPIRATION RATE: 16 BRPM | TEMPERATURE: 98.3 F | HEART RATE: 59 BPM | DIASTOLIC BLOOD PRESSURE: 86 MMHG | WEIGHT: 315 LBS | OXYGEN SATURATION: 93 %

## 2024-07-26 PROBLEM — E87.1 HYPONATREMIA: Status: RESOLVED | Noted: 2021-02-22 | Resolved: 2024-07-26

## 2024-07-26 LAB
APTT PPP: 105 SECONDS (ref 23–37)
GLUCOSE SERPL-MCNC: 118 MG/DL (ref 65–140)
GLUCOSE SERPL-MCNC: 95 MG/DL (ref 65–140)
INR PPP: 2.33 (ref 0.84–1.19)
PROTHROMBIN TIME: 26.5 SECONDS (ref 11.6–14.5)

## 2024-07-26 PROCEDURE — 85610 PROTHROMBIN TIME: CPT

## 2024-07-26 PROCEDURE — 82948 REAGENT STRIP/BLOOD GLUCOSE: CPT

## 2024-07-26 PROCEDURE — 85730 THROMBOPLASTIN TIME PARTIAL: CPT | Performed by: INTERNAL MEDICINE

## 2024-07-26 PROCEDURE — 90935 HEMODIALYSIS ONE EVALUATION: CPT | Performed by: NURSE PRACTITIONER

## 2024-07-26 RX ORDER — TORSEMIDE 100 MG/1
100 TABLET ORAL DAILY
Qty: 30 TABLET | Refills: 0 | Status: CANCELLED | OUTPATIENT
Start: 2024-07-27 | End: 2024-08-26

## 2024-07-26 RX ORDER — NIFEDIPINE 30 MG/1
30 TABLET, EXTENDED RELEASE ORAL DAILY
Qty: 30 TABLET | Refills: 0 | Status: SHIPPED | OUTPATIENT
Start: 2024-07-27 | End: 2024-08-26

## 2024-07-26 RX ORDER — TORSEMIDE 100 MG/1
100 TABLET ORAL DAILY
Qty: 30 TABLET | Refills: 0 | Status: SHIPPED | OUTPATIENT
Start: 2024-07-27 | End: 2024-08-26

## 2024-07-26 RX ORDER — CARVEDILOL 6.25 MG/1
6.25 TABLET ORAL 2 TIMES DAILY WITH MEALS
Qty: 60 TABLET | Refills: 0 | Status: SHIPPED | OUTPATIENT
Start: 2024-07-26 | End: 2024-08-25

## 2024-07-26 RX ORDER — WARFARIN SODIUM 10 MG/1
10 TABLET ORAL
Qty: 30 TABLET | Refills: 0 | Status: SHIPPED | OUTPATIENT
Start: 2024-07-26 | End: 2024-08-25

## 2024-07-26 RX ADMIN — HEPARIN SODIUM 21 UNITS/KG/HR: 10000 INJECTION, SOLUTION INTRAVENOUS at 09:22

## 2024-07-26 RX ADMIN — NIFEDIPINE 30 MG: 30 TABLET, FILM COATED, EXTENDED RELEASE ORAL at 13:37

## 2024-07-26 RX ADMIN — HEPARIN SODIUM 21 UNITS/KG/HR: 10000 INJECTION, SOLUTION INTRAVENOUS at 00:56

## 2024-07-26 RX ADMIN — NYSTATIN: 100000 POWDER TOPICAL at 08:15

## 2024-07-26 RX ADMIN — SEVELAMER HYDROCHLORIDE 800 MG: 800 TABLET ORAL at 08:15

## 2024-07-26 RX ADMIN — ISOSORBIDE MONONITRATE 60 MG: 60 TABLET, EXTENDED RELEASE ORAL at 13:37

## 2024-07-26 RX ADMIN — ATORVASTATIN CALCIUM 80 MG: 40 TABLET, FILM COATED ORAL at 08:15

## 2024-07-26 RX ADMIN — SEVELAMER HYDROCHLORIDE 800 MG: 800 TABLET ORAL at 12:31

## 2024-07-26 RX ADMIN — IRON SUCROSE 100 MG: 20 INJECTION, SOLUTION INTRAVENOUS at 11:43

## 2024-07-26 RX ADMIN — TORSEMIDE 100 MG: 100 TABLET ORAL at 13:37

## 2024-07-26 NOTE — PLAN OF CARE
Problem: Potential for Falls  Goal: Patient will remain free of falls  Description: INTERVENTIONS:  - Educate patient/family on patient safety including physical limitations  - Instruct patient to call for assistance with activity   - Consult OT/PT to assist with strengthening/mobility   - Keep Call bell within reach  - Keep bed low and locked with side rails adjusted as appropriate  - Keep care items and personal belongings within reach  - Initiate and maintain comfort rounds  - Make Fall Risk Sign visible to staff  - Consider moving patient to room near nurses station  Outcome: Progressing     Problem: METABOLIC, FLUID AND ELECTROLYTES - ADULT  Goal: Electrolytes maintained within normal limits  Description: INTERVENTIONS:  - Monitor labs and assess patient for signs and symptoms of electrolyte imbalances  - Administer electrolyte replacement as ordered  - Monitor response to electrolyte replacements, including repeat lab results as appropriate  - Instruct patient on fluid and nutrition as appropriate  Outcome: Progressing  Goal: Fluid balance maintained  Description: INTERVENTIONS:  - Monitor labs   - Monitor I/O and WT  - Instruct patient on fluid and nutrition as appropriate  - Assess for signs & symptoms of volume excess or deficit  Outcome: Progressing     Problem: Prexisting or High Potential for Compromised Skin Integrity  Goal: Skin integrity is maintained or improved  Description: INTERVENTIONS:  - Identify patients at risk for skin breakdown  - Assess and monitor skin integrity  - Assess and monitor nutrition and hydration status  - Monitor labs   - Assess for incontinence   - Turn and reposition patient  - Assist with mobility/ambulation  - Relieve pressure over bony prominences  - Avoid friction and shearing  - Provide appropriate hygiene as needed including keeping skin clean and dry  - Evaluate need for skin moisturizer/barrier cream  - Collaborate with interdisciplinary team   - Patient/family  teaching  - Consider wound care consult   Outcome: Progressing     Problem: Nutrition/Hydration-ADULT  Goal: Nutrient/Hydration intake appropriate for improving, restoring or maintaining nutritional needs  Description: Monitor and assess patient's nutrition/hydration status for malnutrition. Collaborate with interdisciplinary team and initiate plan and interventions as ordered.  Monitor patient's weight and dietary intake as ordered or per policy. Utilize nutrition screening tool and intervene as necessary. Determine patient's food preferences and provide high-protein, high-caloric foods as appropriate.     INTERVENTIONS:  - Monitor oral intake, urinary output, labs, and treatment plans  - Assess nutrition and hydration status and recommend course of action  - Evaluate amount of meals eaten  - Assist patient with eating if necessary   - Allow adequate time for meals  - Recommend/ encourage appropriate diets, oral nutritional supplements, and vitamin/mineral supplements  - Order, calculate, and assess calorie counts as needed  - Recommend, monitor, and adjust tube feedings and TPN/PPN based on assessed needs  - Assess need for intravenous fluids  - Provide specific nutrition/hydration education as appropriate  - Include patient/family/caregiver in decisions related to nutrition  Outcome: Progressing     Problem: INFECTION - ADULT  Goal: Absence or prevention of progression during hospitalization  Description: INTERVENTIONS:  - Assess and monitor for signs and symptoms of infection  - Monitor lab/diagnostic results  - Monitor all insertion sites, i.e. indwelling lines, tubes, and drains  - Monitor endotracheal if appropriate and nasal secretions for changes in amount and color  - [place] appropriate cooling/warming therapies per order  - Administer medications as ordered  - Instruct and encourage patient and family to use good hand hygiene technique  - Identify and instruct in appropriate isolation precautions for  identified infection/condition  Outcome: Progressing  Goal: Absence of fever/infection during neutropenic period  Description: INTERVENTIONS:  - Monitor WBC    Outcome: Progressing     Problem: PAIN - ADULT  Goal: Verbalizes/displays adequate comfort level or baseline comfort level  Description: Interventions:  - Encourage patient to monitor pain and request assistance  - Assess pain using appropriate pain scale  - Administer analgesics based on type and severity of pain and evaluate response  - Implement non-pharmacological measures as appropriate and evaluate response  - Consider cultural and social influences on pain and pain management  - Notify physician/advanced practitioner if interventions unsuccessful or patient reports new pain  Outcome: Progressing     Problem: SAFETY ADULT  Goal: Patient will remain free of falls  Description: INTERVENTIONS:  - Educate patient/family on patient safety including physical limitations  - Instruct patient to call for assistance with activity   - Consult OT/PT to assist with strengthening/mobility   - Keep Call bell within reach  - Keep bed low and locked with side rails adjusted as appropriate  - Keep care items and personal belongings within reach  - Initiate and maintain comfort rounds  - Make Fall Risk Sign visible to staff  - Apply yellow socks and bracelet for high fall risk patients  - Consider moving patient to room near nurses station  Outcome: Progressing  Goal: Maintain or return to baseline ADL function  Description: INTERVENTIONS:  -  Assess patient's ability to carry out ADLs; assess patient's baseline for ADL function and identify physical deficits which impact ability to perform ADLs (bathing, care of mouth/teeth, toileting, grooming, dressing, etc.)  - Assess/evaluate cause of self-care deficits   - Assess range of motion  - Assess patient's mobility; develop plan if impaired  - Assess patient's need for assistive devices and provide as appropriate  -  Encourage maximum independence but intervene and supervise when necessary  - Involve family in performance of ADLs  - Assess for home care needs following discharge   - Consider OT consult to assist with ADL evaluation and planning for discharge  - Provide patient education as appropriate  Outcome: Progressing  Goal: Maintains/Returns to pre admission functional level  Description: INTERVENTIONS:  - Perform AM-PAC 6 Click Basic Mobility/ Daily Activity assessment daily.  - Set and communicate daily mobility goal to care team and patient/family/caregiver.   - Collaborate with rehabilitation services on mobility goals if consulted  - Perform Range of Motion   - Out of bed for toileting  - Record patient progress and toleration of activity level   Outcome: Progressing     Problem: DISCHARGE PLANNING  Goal: Discharge to home or other facility with appropriate resources  Description: INTERVENTIONS:  - Identify barriers to discharge w/patient and caregiver  - Arrange for needed discharge resources and transportation as appropriate  - Identify discharge learning needs (meds, wound care, etc.)  - Arrange for interpretive services to assist at discharge as needed  - Refer to Case Management Department for coordinating discharge planning if the patient needs post-hospital services based on physician/advanced practitioner order or complex needs related to functional status, cognitive ability, or social support system  Outcome: Progressing     Problem: Knowledge Deficit  Goal: Patient/family/caregiver demonstrates understanding of disease process, treatment plan, medications, and discharge instructions  Description: Complete learning assessment and assess knowledge base.  Interventions:  - Provide teaching at level of understanding  - Provide teaching via preferred learning methods  Outcome: Progressing

## 2024-07-26 NOTE — DISCHARGE INSTR - AVS FIRST PAGE
Dear Joaquin Frankel,     It was our pleasure to care for you here at [facility].  It is our hope that we were always able to exceed the expected standards for your care during your stay.  You were hospitalized due to chest pain and shortness of breath.  You were cared for on the third floor by Deya Palomino MD under the service of Jimbo Reddy MD with the [facility] who covers for your primary care physician (PCP), Hany Mcfarland DO, while you were hospitalized.  If you have any questions or concerns related to this hospitalization, you may contact us at .  For follow up as well as any medication refills, we recommend that you follow up with your primary care physician.  A registered nurse will reach out to you by phone within a few days after your discharge to answer any additional questions that you may have after going home. However, at this time we provide for you here, the most important instructions / recommendations at discharge:     Notable Medication Adjustments -   Start taking warfarin 10 mg 1 tablet daily starting tomorrow.  Start taking carvedilol 6.25 mg 1 tablet twice daily starting today.  Start taking nifedipine 30 mg 1 tablet daily starting today.  Stop taking metoprolol.  No other medication changes were made.  Testing Required after Discharge -   Follow-up with your primary care physician to have your INR checked in 3 days, 7/29/2024.  Important follow up information -   Follow-up with your PCP within 1 week of discharge.  Please keep your follow-up appointment with your cardiologist.  Please keep your sleep study appointment.  Other Instructions -   Please return to the emergency room if you experience chest pain, shortness of breath, sudden unexpected weight gain, or worsening of symptoms.  Please review this entire after visit summary as additional general instructions including medication list, appointments,  activity, diet, any pertinent wound care, and other additional recommendations from your care team that may be provided for you.      Sincerely,     Deya Palomino MD    Complex Repair Preamble Text (Leave Blank If You Do Not Want): Extensive wide undermining was performed.

## 2024-07-26 NOTE — DISCHARGE SUMMARY
[facility]  Discharge- Joaquin Frankel 1980, 44 y.o. male MRN: [id number]  Unit/Bed#: S [facility] Encounter: [id number]  Primary Care Provider: Hany Mcfarland DO   Date and time admitted to hospital: 7/4/2024  2:44 PM    * Pulmonary embolism (HCC)  Assessment & Plan  Patient presented for worsening chest pain, shortness of breath, and dyspnea on exertion.  Patient was admitted for similar chest pain in early June and was found to have ESRD, started on HD MWF   D-Dimer 4.21  CTA showed subsegmental right lower lobe pulmonary embolism  7/12/24 Heparin was subtherapeutic this morning, PTT 55. No bolus was given or increase in gtt as patient will have the gtt stopped at 10 am pending IR procedure.  Patient has periods of hypoxia in the 80s supplemental oxygen encouraged.    Plan:  Discharged on warfarin 10 mg daily.  Is to follow-up for repeat INR in 3 days 7/29. Goal INR 2-3    ESRD (end stage renal disease) on dialysis (HCC)  Assessment & Plan  Lab Results   Component Value Date    EGFR 6 07/24/2024    EGFR 7 07/23/2024    EGFR 5 07/22/2024    CREATININE 9.46 (H) 07/24/2024    CREATININE 8.08 (H) 07/23/2024    CREATININE 10.90 (H) 07/22/2024     Patient ESRD on Dialysis 3 times a week, MWF  Elevated , c/o of orthopnea   Etiology of CLOVIS: Etiology not entirely clear - Possible MGRS? Pattern of disease progression is not typical for diabetic nephropathy.   Etiology of CKD: Suspected due to diabetic nephropathy, hypertensive nephrosclerosis, obesity related renal dysfunction  Previous baseline creatinine 1.3-1.6 w/ prior episodes of CLOVIS  Serologic workup negative except for IgG kappa on SPEP and UPEP.  Cryoglobulin negative, ANCA negative, anti-dsDNA negative, anti-GBM negative, JOSE ROBERTO negative, C4 34, C3 118  Bone marrow biopsy 6/12/2024: negative for plasma cell neoplasm.   CRRT initiated 6/7/2024. Peak creatinine 10.4  7/19 L renal biopsy performed. 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 No evidence of MGRS on biopsy     Plan:  Continue HD   Per nephrology, continue Nifedipine 30 mg daily, carvedilol 6.25 mg BID, Imdur 60 mg daily, torsemide 100 mg daily, sevelamer 800mg TID    Hypertension  Assessment & Plan  History of HTN, recent medication change after dx of ESRD  Workup at that time notable for IgG kappa monoclonal gammopathy and s/p bone marrow biopsy on 6/12/2023 to assess for multiple myeloma. Bone marrow biopsy with no evidence of plasma cell neoplasm.   HD on MWF schedule.   Presented to ED with uncontrolled /80. Endorsed headaches and blurry vision in his left eye in the ED that has since resolved.    Plan:  Continue nifedipine 30 mg, carvedilol 6.25 mg twice daily, Imdur 60 mg once daily, torsemide 100 mg daily    Chest pain  Assessment & Plan  Patient presented with worsening substernal chest pain over several weeks.  H/o chronic angina, CAD s/p SABINE, family history of hereditary heart disease on fathers side.   Patient was admitted for similar chest pain in early June and was found to have ESRD, started on HD MWF.  Recent nuclear stress test without significant reversible ischemia  EKG on admission: Sinus Rhythm with PAC's. Repeat EKG showed sinus bradycardia  High-sensitivity troponin negative x 5   . D-Dimer 4.21  CTA: subsegmental right lower lobe pulmonary embolism  07/05/24: Updated echocardiogram on unremarkable  CP Multifactorial from acute on chronic anemia, hypertensive urgency, and acute PE   Patient's chest pain has resolved following hemodialysis and the improvement of high blood pressure.   7/9: Telemetry discontinued    Plan-  Continue nifedipine 30 mg, Coreg 6.25 mg BID, Imdur 60 mg daily, atorvastatin 80 mg daily, torsemide 100 mg daily, ASA, and warfarin 10 mg daily    Type 2 diabetes mellitus (HCC)  Assessment & Plan  Lab Results   Component Value Date    HGBA1C 5.7 (H) 06/09/2024        Recent Labs     07/25/24  1706 07/25/24  2138 07/26/24  0717 07/26/24  1056   POCGLU 86 90 95 118       Blood Sugar Average: Last 72 hrs:  (P) 96.5    Counseled patient lifestyle modifications  Continue glimepiride and follow-up with PCP for medication adjustments if needed.    Anemia  Assessment & Plan    Hemoglobin   Date Value Ref Range Status   07/22/2024 9.2 (L) 12.0 - 17.0 g/dL Final   01/23/2017 14.5 12.6 - 17.7 g/dL Final     Hematocrit   Date Value Ref Range Status   07/22/2024 29.2 (L) 36.5 - 49.3 % Final   01/23/2017 43.8 37.5 - 51.0 % Final       Patient has history of anemia of chronic disease, ESRD  CBC on admission showed hgb 6.6 / hct 21.2  Received 2 units packed RBC's in ED. Hbg 7.6 after transfusion  Hematology following with outpatient f/u scheduled 7/11/2024, patient will reschedule due to overlapping hospital admission   On oral iron twice daily  No active bleeding, on heparin for PE, will switch to oral AC after renal biopsy procedure.  7/8/2024 Patient was transfused 1 unit of blood during hemodialysis. Hemoglobin 8.8, hematocrit 27.1  S/P 3 units packed RBCs  Start IV Venofer 100 mg with each dialysis treatment for total 10 doses starting 7/10/24   Hemoglobin stable    ANGY (obstructive sleep apnea)  Assessment & Plan  Overnight pulse oximetry obtained: highest O2 sat 99%, his lowest O2 sat 73%, with a mean of 91%. Patient had 120 desaturation events <89%.  He spent an 1 hour and 15 minutes in desaturation (~15.82%) and and 1 hour and 34 minutes with desaturation <89% (~19.77%).  Counseled patient on importance of maintaining oxygen saturation as oxygen deprivation can lead to hypertension, heart failure, coronary artery disease, pulmonary hypertension, etc.    Patient was advised of need to wear oxygen at night upon discharge. He would like to wait for formal evaluation via sleep study as an outpatient, but agrees to wear supplemental oxygen currently.    Plan:  Keep follow-up sleep  study appointment.    Chronic acquired lymphedema  Assessment & Plan  Chronic LLE lymphedema x 3 years after surgical resection of LLE cyst    Plan:  Advised compression and elevation of lower extremity for symptomatic edema relief    Acute respiratory failure with hypoxia (HCC)-resolved as of 7/6/2024  Assessment & Plan  Patient O2 saturation on admission was 83% on RA  Was placed on 3/L Nasal canula FiO2 32%  CTA showed right lower lobe subsegmental PE  Was placed on heparin    Plan:  See PE    Hyponatremia-resolved as of 7/26/2024  Assessment & Plan  Lab Results   Component Value Date/Time    SODIUM 135 07/24/2024 06:04 AM     Likely due to fluid overload  Resolved        Medical Problems       Resolved Problems  Date Reviewed: 7/20/2024            Resolved    Hyponatremia 7/26/2024     Resolved by  Deya Palomino MD    Acute respiratory failure with hypoxia (HCC) 7/6/2024     Resolved by  Guy Vazquez MD        Discharging Resident: Deya Palomino MD  Discharging Attending: Jimbo Reddy MD  PCP: Hany Mcfarland DO  Admission Date:   Admission Orders (From admission, onward)       Ordered        07/04/24 2149  INPATIENT ADMISSION  Once                          Discharge Date: 07/26/24    Consultations During Hospital Stay:  Interventional radiology, cardiology, nephrology    Procedures Performed:   Renal biopsy per interventional radiology    Significant Findings / Test Results:   CTA PE: Subsegmental right lower lobe pulmonary embolism.   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 No evidence of MGRS    Incidental Findings:   None    Test Results Pending at Discharge (will require follow up):  None     Outpatient Tests Requested:  Repeat INR in 3 days, 7/29/2024     Complications: None    Reason for Admission: Pulmonary embolism    patient who originally presented to the hospital on 7/4/2024       Hospital Course:    Joaquin Frankel is a 44 y.o. male with a PMHx of ESRD on HD, CAD s/p stenting/PCI, HTN, TIIDM, chronic LLE lymphedema who presents with c/o chest pain and shortness of breath with dyspnea on exertion.  He states chest pain and shortness of breath have been worsening to the point where he is unable to walk 10 feet or shower without needing to take a break. He was hospitalized from [facility] and underwent testing including echocardiogram and stress test. He notes that his enzymes were not elevated. He was identified to have renal failure and initiated on dialysis during that admission.    In the emergency department patient was found to be in hypertensive urgency up to 206/95 and hypoxic 83 on RA so he was placed on 3L NC.  Workup in the ED notable for H&H 6.6/21.2, negative troponins (31,35,36), , D-dimer 4.21, CTA PE revealing subsegmental right lower lobe pulmonary embolism, and CXR w/ mild pulmonary venous congestion. He received 2 units of packed red blood cells in the ED and was placed on heparin drip.  Patient was admitted and cardiology as well as nephrology were consulted.     Cardiology adjusted the patient's medications adding amlodipine and switch metoprolol to Coreg 6.25 mg twice daily. Obtained repeat echo, with no significant change from 6/2024. Per cardiology, chest pain seems multifactorial (severe uncontrolled hypertension, volume overload in context of end-stage renal disease, symptomatic anemia, subsegmental pulmonary embolism) with no clear objective suggestion of angina/ACS. Dialysis was continued on a MWF schedule. Pt was transfused an additional unit of blood during hemodialysis due to hemoglobin being below goal of 8 (total of 3 units) and started on IV venofer for 10 doses with dialysis.    There were several delays in IR guided renal biopsy due to recent ASA use, HTN despite sedation and antihypertensive medications, and then hypoxia (76-89%) during pt's dialysis session that  "was likely due to ANGY/OHS. Due to continued hypertension hydralazine TID was added for better blood pressure control (amlodipine and hydralazine were eventually discontinued and nifedipine was added). 7/19 renal biopsy was performed (results above). Thereafter, patient was bridged to warfarin 5mg >>7.5mg>>10mg>>12.5mg until INR became therapeutic. Patient discharged on 10 mg of warfarin and is to follow-up with his primary care physician to have repeat INR in 3 days 7/29.    Please see above list of diagnoses and related plan for additional information.     Condition at Discharge: stable    Discharge Day Visit / Exam:     Subjective:  Patient was seen and evaluated at bedside. No overnight events. He endorses feeling down since learning the results of his renal biopsy.  He offers no complaints at this time.  Denies chest pain, shortness of breath, vomiting, neck pain. Informed him of therapeutic INR and disposition plan. He understands and agrees.    Vitals: Blood Pressure: 170/86 (07/26/24 1235)  Pulse: 59 (07/26/24 1235)  Temperature: 98.3 °F (36.8 °C) (07/26/24 1235)  Temp Source: Tympanic (07/26/24 1235)  Respirations: 16 (07/26/24 1235)  Height: 5' 7\" (170.2 cm) (07/15/24 0458)  Weight - Scale: (!) 164 kg (361 lb 15.9 oz) (07/26/24 1235)  SpO2: 93 % (07/26/24 1235)  Exam:   Physical Exam  Vitals and nursing note reviewed.   Constitutional:       General: He is not in acute distress.     Appearance: He is obese. He is not ill-appearing.   HENT:      Head: Normocephalic and atraumatic.      Mouth/Throat:      Mouth: Mucous membranes are moist.   Eyes:      Extraocular Movements: Extraocular movements intact.      Conjunctiva/sclera: Conjunctivae normal.   Cardiovascular:      Rate and Rhythm: Normal rate and regular rhythm.      Pulses: Normal pulses.      Heart sounds: Normal heart sounds. No murmur heard.     No gallop.   Pulmonary:      Effort: Pulmonary effort is normal. No respiratory distress.      Breath " sounds: Normal breath sounds. No wheezing.   Abdominal:      General: Bowel sounds are normal. There is no distension.      Palpations: Abdomen is soft.      Tenderness: There is no abdominal tenderness.      Comments: Lymphedema noted to the left lower pannus   Musculoskeletal:         General: Normal range of motion.      Cervical back: Normal range of motion and neck supple. No rigidity.      Right lower leg: Edema (violaceous skin changes right lower extremity with healing punctate wounds over shin) present.      Left lower leg: Edema (lymphedema) present.      Comments: Bandage noted to left back. No surrounding swelling, discoloration, or signs of bleeding noted.  LLE significantly larger than RLE due to chronic lymphedema. Healed surgical incision noted to left calf.    Skin:     General: Skin is warm and dry.      Comments: R IJV PermCath site clear. Dressing intact.   Neurological:      General: No focal deficit present.      Mental Status: He is alert and oriented to person, place, and time. Mental status is at baseline.   Psychiatric:         Mood and Affect: Mood normal.        Discussion with Family: Patient declined call to .     Discharge instructions/Information to patient and family:   See after visit summary for information provided to patient and family.      Provisions for Follow-Up Care:  See after visit summary for information related to follow-up care and any pertinent home health orders.      Mobility at time of Discharge:   Basic Mobility Inpatient Raw Score: 24  JH-HLM Goal: 8: Walk 250 feet or more  JH-HLM Achieved: 4: Move to chair/commode  HLM Goal NOT achieved. Continue to encourage mobility in post discharge setting.     Disposition:   Home    Planned Readmission: No    Discharge Medications:  See after visit summary for reconciled discharge medications provided to patient and/or family.      **Please Note: This note may have been constructed using a voice recognition  system**

## 2024-07-26 NOTE — PROGRESS NOTES
NEPHROLOGY HOSPITAL PROGRESS NOTE   Joaquin Frankel 44 y.o. male MRN: [id number]  Unit/Bed#: S -01 Encounter: [id number]  Reason for Consult: Acute kidney injury    ASSESSMENT and PLAN:  44-year-old male with a history of diabetes mellitus, hypertension, CAD, monoclonal gammopathy, morbid obesity, left lower extremity lymphedema who presented with shortness of breath.  Nephrology consulted for CLOVIS.    Acute kidney injury now ESRD:  Etiology:   Renal biopsy report shows primarily diabetic nephropathy.  Also a collapsing variant of FSGS.  Unlikely recovery.  Severe fibrosis.  Workup:  UACR 5288 mg/G  Urinalysis with 3+ protein, 10-20 RBCs  CT without contrast: Kidneys unremarkable.  No hydronephrosis.  No evidence of hematoma  On hemodialysis every Monday, Wednesday, Friday  Outpatient placement established at [facility] which is where patient works.  He has a plan for dialysis after work  Plan:   Seen on treatment today.  Tolerating without issue.  Continue treatment every Monday, Wednesday, Friday.  Outpatient treatment at [facility] on Monday.  Outpatient clinic will be notified of discharge.  Encourage Joaquin to get listed for renal transplant--I discussed this again today with Joaquin.    Access: Tunneled dialysis catheter placed on 6/14    Electrolytes/acid-base: Acceptable.  Instructed to follow a low potassium diet    Mild hyponatremia:  Resolved.  Last sodium level normal    Hypertension/volume status:  Euvolemic.  Improving.  Significantly below target weight.  Current medications: Nifedipine 30 mg daily, carvedilol 6.25 mg twice a day.  Isosorbide mononitrate 60 mg daily.  Diuretic: on torsemide 100 mg daily  Blood pressure acceptable.  No changes.    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  Goal hemoglobin 10-11.   Monitor  No JOSSELYN due to monoclonal gammopathy, PE  Try to avoid transfusion.  If transfusion is needed administer leukoreduced CMV negative blood    Monoclonal gammopathy  Status post bone marrow biopsy  Management per oncology    Pulmonary embolism:  Transitioned to Coumadin      PLAN SUMMARY:  Dialysis Monday, Wednesday, Friday  Stable for discharge today.  Discussed with primary service  Outpatient referral to transplant center.  Discussed multiple options such as [facility], [facility] and [facility]    SUBJECTIVE / 24H INTERVAL HISTORY:  No complaints.  Asked many questions regarding transplant, dialysis, need for anticoagulation.  Length of anticoagulation.    OBJECTIVE:  Current Weight: Weight - Scale: (!) 167 kg (367 lb 11.6 oz)  Vitals:    07/26/24 1000 07/26/24 1030 07/26/24 1100 07/26/24 1130   BP: 162/88 155/83 164/82 (!) 173/87   BP Location:       Pulse: 61 60 56 61   Resp: 15 16 15 16   Temp:       TempSrc:       SpO2: 92% 99% 93% 93%   Weight:       Height:           Intake/Output Summary (Last 24 hours) at 7/26/2024 1135  Last data filed at 7/26/2024 0904  Gross per 24 hour   Intake 880 ml   Output 0 ml   Net 880 ml   General: NAD, comfortably lying in bed on dialysis  Skin: no rash  Eyes: anicteric sclera  ENT: moist mucous membrane  Neck: supple  Chest: CTA b/l, no ronchii, no wheeze, no rubs, no rales.  Normal effort  CVS: s1s2, no murmur, no gallop, no rub.  Normal rate regular rhythm.  Nondistended  Abdomen: soft, nontender, nl sounds  Extremities: + edema LE b/l  : no lewis  Neuro: AAOX3  Psych: normal affect   Medications:    Current Facility-Administered Medications:     acetaminophen (TYLENOL) tablet 975 mg, 975 mg, Oral, Q8H [facility], Marsha Martin DO, 975 mg at 07/24/24 2216    atorvastatin (LIPITOR) tablet 80 mg, 80 mg, Oral, Daily, Mily Martin MD, 80 mg at 07/26/24 0815    carvedilol (COREG) tablet 6.25 mg, 6.25 mg, Oral, BID With Meals, Andrea Longoria MD,  6.25 mg at 07/25/24 1716    HYDROmorphone (DILAUDID) injection 0.5 mg, 0.5 mg, Intravenous, Q3H PRN, Cary Wilkinson MD, 0.5 mg at 07/24/24 1832    iron sucrose (VENOFER) 100 mg in sodium chloride 0.9 % 100 mL IVPB, 100 mg, Intravenous, After Dialysis, Johnny Viramontes MD, Stopped at 07/22/24 1207    isosorbide mononitrate (IMDUR) 24 hr tablet 60 mg, 60 mg, Oral, Daily, Mily Martin MD, 60 mg at 07/25/24 0947    lidocaine (LIDODERM) 5 % patch 3 patch, 3 patch, Topical, Daily, Cary Wilkinson MD    melatonin tablet 3 mg, 3 mg, Oral, HS, Cary Wilkinson MD, 3 mg at 07/24/24 2216    naloxone (NARCAN) 0.04 mg/mL syringe 0.04 mg, 0.04 mg, Intravenous, Q1MIN PRN, Cary Wilkinson MD    NIFEdipine (PROCARDIA XL) 24 hr tablet 30 mg, 30 mg, Oral, Daily, Joselyn Reyes Bahamonde, MD, 30 mg at 07/25/24 0947    nystatin (MYCOSTATIN) powder, , Topical, BID, Lorir Maria MD, Given at 07/26/24 0815    ondansetron (ZOFRAN) injection 4 mg, 4 mg, Intravenous, Once, Cindy Balderas MD    ondansetron (ZOFRAN) injection 4 mg, 4 mg, Intravenous, Q8H PRN, Cam Hills MD, 4 mg at 07/23/24 1105    oxyCODONE (ROXICODONE) immediate release tablet 10 mg, 10 mg, Oral, Q4H PRN, Cary Wilkinson MD, 10 mg at 07/24/24 1706    oxyCODONE (ROXICODONE) IR tablet 5 mg, 5 mg, Oral, Q4H PRN, Cary Wilkinson MD, 5 mg at 07/24/24 2221    polyethylene glycol (MIRALAX) packet 17 g, 17 g, Oral, Daily PRN, Diane Oliveira MD, 17 g at 07/24/24 1713    sevelamer (RENAGEL) tablet 800 mg, 800 mg, Oral, TID With Meals, Mily Martin MD, 800 mg at 07/26/24 0815    torsemide (DEMADEX) tablet 100 mg, 100 mg, Oral, Daily, Charles Spear MD, 100 mg at 07/25/24 0947    warfarin (COUMADIN) tablet 12.5 mg, 12.5 mg, Oral, Daily (warfarin), Lorri Maria MD, 12.5 mg at 07/25/24 8333    Laboratory Results:  Results from last 7 days   Lab Units 07/24/24  0604 07/23/24  0558 07/22/24  0511 07/21/24  0907 07/20/24  1020 07/20/24  0538 07/20/24  0536 07/19/24 2020   WBC Thousand/uL  --   --  5.69  --  6.24   --  6.23 6.71   HEMOGLOBIN g/dL  --   --  9.2*  --  9.2*  --  9.2* 8.9*   HEMATOCRIT %  --   --  29.2*  --  29.5*  --  29.7* 28.6*   PLATELETS Thousands/uL  --   --  184  --  189  --  185 174   POTASSIUM mmol/L 5.0 4.8 5.1 4.7  --  4.9  --   --    CHLORIDE mmol/L 98 96 96 96  --  96  --   --    CO2 mmol/L 29 30 27 29  --  30  --   --    BUN mg/dL 41* 32* 50* 42*  --  33*  --   --    CREATININE mg/dL 9.46* 8.08* 10.90* 9.86*  --  8.65*  --   --    CALCIUM mg/dL 9.3 9.3 9.2 9.1  --  9.2  --   --    MAGNESIUM mg/dL  --   --   --   --   --  2.0  --   --    PHOSPHORUS mg/dL  --   --   --   --   --  5.2*  --   --

## 2024-07-27 ENCOUNTER — TELEPHONE (OUTPATIENT)
Dept: FAMILY MEDICINE CLINIC | Facility: CLINIC | Age: 44
End: 2024-07-27

## 2024-07-27 DIAGNOSIS — I26.99 ACUTE PULMONARY EMBOLISM, UNSPECIFIED PULMONARY EMBOLISM TYPE, UNSPECIFIED WHETHER ACUTE COR PULMONALE PRESENT (HCC): Primary | ICD-10-CM

## 2024-07-27 NOTE — TELEPHONE ENCOUNTER
----- Message from Deya Palomino MD sent at 7/26/2024  1:52 PM EDT -----  Good afternoon Dr. Mcfarland,    The above patient was hospitalized for PE.  We achieved therapeutic INR with 12.5 mg warfarin.  He is being discharged on 10 mg of warfarin.  I would appreciate if you checked his INR on Monday, and monitor his warfarin dosage and adjustments.    Warfarin needs to be controlled and monitored by your office because warfarin cardiology clinic will not monitor his INR given warfarin indication is noncardiac.    Best Regards,     Deya Palomino MD

## 2024-07-27 NOTE — TELEPHONE ENCOUNTER
Please call him Monday 7/29/24 to tell him to go to [facility] to check his INR per hospital discharge instruction.

## 2024-07-29 ENCOUNTER — TRANSITIONAL CARE MANAGEMENT (OUTPATIENT)
Dept: FAMILY MEDICINE CLINIC | Facility: CLINIC | Age: 44
End: 2024-07-29

## 2024-07-29 ENCOUNTER — TELEPHONE (OUTPATIENT)
Age: 44
End: 2024-07-29

## 2024-07-29 ENCOUNTER — TELEPHONE (OUTPATIENT)
Dept: FAMILY MEDICINE CLINIC | Facility: CLINIC | Age: 44
End: 2024-07-29

## 2024-07-29 ENCOUNTER — PATIENT OUTREACH (OUTPATIENT)
Dept: FAMILY MEDICINE CLINIC | Facility: CLINIC | Age: 44
End: 2024-07-29

## 2024-07-29 LAB
INR PPP: 2.2 (ref 0.9–1.2)
PROTHROMBIN TIME: 21.7 SEC (ref 9.1–12)

## 2024-07-29 NOTE — TELEPHONE ENCOUNTER
I spoke with Joaquin and scheduled his TCM appt. He is going this am for his INR and taking 10 mg Warfarin. What is his INR goal range so we can set up his calendar? Thanks Nikky SOFIA

## 2024-07-29 NOTE — TELEPHONE ENCOUNTER
----- Message from Yoli RAMOS sent at 7/29/2024 11:22 AM EDT -----  Regarding: INR/Warfarin follow up and Nephrology referral  Good morning! I just spoke with Vineet and he seems to be doing well. I see he has an apt next week on 8/7. He was started on Warfarin for PE and had INR drawn today. He says he was supposed to f/u with his PCP. He also recently started on hemodialysis and he has not seen a Nephrologist as an OP. He says he is not sure who he should see or where to go. He says he wants to stay near home. Can the office please take care of these 2 things? Please advise. Thanks! Yoli

## 2024-07-29 NOTE — PROGRESS NOTES
ADT Alert received via IB. Chart reviewed. Patient recently admitted to  at [facility] 7/4-7/26 with PE. Patient discharged home independent. Patient with newly diagnosed ESRD 2 months ago.    This RNCM called patient and introduced self and discussed the role of the RNCM and CCM services. Patient says he is feeling much better since admission. He says his breathing is good. He denies any CP, chest tightness or SOB. He states the swelling he had all over, especially in his legs has gone away mostly. He says he has lost 60 lbs in the last 48 days since he is taking a diuretic and on dialysis. He states he is feeling better with the weight off. He says he is getting around well and is driving. He is planning on going back to work next Monday but also has papers to fill out for disability because he is not sure how work will go. He works as a . Patient says he goes to hemodialysis on MWF's at [facility]. He has a 3 PM chair time and drives himself. He says he lives with his son at this time and is in the works of moving in with his cousin so if he were to need anything he would have help.     Patient states he manages his medications and was able to obtain all medications at discharge. We reviewed all of patients medications and AVS. He denies any questions or concerns regarding.     Patient with a h/o diabetes and was on Insulin at one time but says he has not been on it for a while because his BS's have been below 150 for the last 2 1/2-3 months now. Patient is taking Glimepiride as directed. Patient has never seen an Endocrinologist but has an apt scheduled for September. He says he tried to get in sooner but then ended back in the hospital. He says he will call the office and get a sooner apt. Patient also does not have an apt with a Nephrologist. He says he would like to see one close to him if possible. He says he will be discussing with his PCP at his upcoming visit on 8/7. Patient is also now taking  Warfarin and will need INR management. Patient says he had BW drawn this morning and will be following up with his PCP for this as well. I told him I would message his PCP regarding INR and Nephrology referral.    Patient is agreeable to [facility] services and follow up next week.    Patient was unable to take down my contact information but requested I call his phone back and leave it on VM. This was done.    An IB message was sent to PCP regarding the above mentioned.

## 2024-07-29 NOTE — TELEPHONE ENCOUNTER
----- Message from Deya Palomino MD sent at 7/28/2024  6:03 PM EDT -----  Hello,    At least 6 months. This is a provoked PE.    Thank you,    Deya Palomino MD  ----- Message -----  From: Hany Mcfarland DO  Sent: 7/27/2024   1:30 AM EDT  To: Deya Palomino MD    Thanks.  Did anyone specify the duration of anti-coagulation?  ----- Message -----  From: Deya Palomino MD  Sent: 7/26/2024   1:55 PM EDT  To: Hany Mcfarland DO    Good afternoon Dr. Mcfarland,    The above patient was hospitalized for PE.  We achieved therapeutic INR with 12.5 mg warfarin.  He is being discharged on 10 mg of warfarin.  I would appreciate if you checked his INR on Monday, and monitor his warfarin dosage and adjustments.    Warfarin needs to be controlled and monitored by your office because warfarin cardiology clinic will not monitor his INR given warfarin indication is noncardiac.    Best Regards,     Deya Palomino MD

## 2024-07-29 NOTE — TELEPHONE ENCOUNTER
Patient called in to schedule an ED follow up (TCM). Patient was admitted from 7/4-7/26, attempted to warm transfer to office clerical for scheduling but was unsuccessful.     Please call patient back to schedule, Thank you

## 2024-07-30 ENCOUNTER — TELEPHONE (OUTPATIENT)
Dept: HEMATOLOGY ONCOLOGY | Facility: CLINIC | Age: 44
End: 2024-07-30

## 2024-07-30 ENCOUNTER — ANTICOAG VISIT (OUTPATIENT)
Dept: FAMILY MEDICINE CLINIC | Facility: CLINIC | Age: 44
End: 2024-07-30

## 2024-07-30 DIAGNOSIS — I26.99 ACUTE PULMONARY EMBOLISM, UNSPECIFIED PULMONARY EMBOLISM TYPE, UNSPECIFIED WHETHER ACUTE COR PULMONALE PRESENT (HCC): Primary | ICD-10-CM

## 2024-07-30 NOTE — TELEPHONE ENCOUNTER
I phoned the patient and introduced myself and indicated that I was calling from [facility] Hematology/Oncology to provide a reminder of his upcoming appointment. We reviewed the appointment details ( 8/1, Dr. Snyder, [place] office, [address]), the office address and location, and the [facility] [place] number as the office contact. Additionally, we reviewed that the appointment is to review the results of his BMBX taken in June. Joaquin expressed understanding, indicated that he would be able to attend the appointment, and was appreciative of the call.

## 2024-07-31 ENCOUNTER — RA CDI HCC (OUTPATIENT)
Dept: OTHER | Facility: HOSPITAL | Age: 44
End: 2024-07-31

## 2024-07-31 PROBLEM — N18.4 CKD (CHRONIC KIDNEY DISEASE) STAGE 4, GFR 15-29 ML/MIN (HCC): Status: RESOLVED | Noted: 2024-03-02 | Resolved: 2024-07-31

## 2024-07-31 PROBLEM — E11.22 TYPE 2 DIABETES MELLITUS WITH DIABETIC CHRONIC KIDNEY DISEASE (HCC): Status: ACTIVE | Noted: 2024-07-31

## 2024-07-31 NOTE — PROGRESS NOTES
HCC coding opportunities       Chart reviewed, no opportunity found: CHART REVIEWED, NO OPPORTUNITY FOUND        Patients Insurance        Commercial Insurance: Smart [employer]

## 2024-08-01 ENCOUNTER — TELEPHONE (OUTPATIENT)
Dept: HEMATOLOGY ONCOLOGY | Facility: CLINIC | Age: 44
End: 2024-08-01

## 2024-08-01 NOTE — TELEPHONE ENCOUNTER
Called patient regarding no show. Patient forgot about visit. Patient will call back to reschedule.

## 2024-08-02 ENCOUNTER — TELEPHONE (OUTPATIENT)
Dept: FAMILY MEDICINE CLINIC | Facility: CLINIC | Age: 44
End: 2024-08-02

## 2024-08-02 ENCOUNTER — OFFICE VISIT (OUTPATIENT)
Dept: HEMATOLOGY ONCOLOGY | Facility: CLINIC | Age: 44
End: 2024-08-02
Payer: COMMERCIAL

## 2024-08-02 VITALS
HEART RATE: 65 BPM | HEIGHT: 67 IN | DIASTOLIC BLOOD PRESSURE: 72 MMHG | WEIGHT: 315 LBS | SYSTOLIC BLOOD PRESSURE: 132 MMHG | RESPIRATION RATE: 18 BRPM | OXYGEN SATURATION: 96 % | TEMPERATURE: 97.2 F | BODY MASS INDEX: 49.44 KG/M2

## 2024-08-02 DIAGNOSIS — D47.2 MGUS (MONOCLONAL GAMMOPATHY OF UNKNOWN SIGNIFICANCE): Primary | ICD-10-CM

## 2024-08-02 PROCEDURE — 3075F SYST BP GE 130 - 139MM HG: CPT | Performed by: INTERNAL MEDICINE

## 2024-08-02 PROCEDURE — 99214 OFFICE O/P EST MOD 30 MIN: CPT | Performed by: INTERNAL MEDICINE

## 2024-08-02 PROCEDURE — 3078F DIAST BP <80 MM HG: CPT | Performed by: INTERNAL MEDICINE

## 2024-08-02 NOTE — PROGRESS NOTES
Hematology Outpatient Office Note    Date of Service: 2024    [facility]  [address]  [facility] [address]  [phone]    Reason for Consultation:   Chief Complaint   Patient presents with    Follow-up       Referral Physician: No ref. provider found    Primary Care Physician:  Hany Mcfarland DO     Nickname: Joaquin    Son lives with him, Elver     Original ECO    Today's ECO    Goals and Barriers:  Current Goal: Minimize effects of disease burden, extend life.   Barriers to accomplishing this: None    Patient's Capacity to Self Care:  Patient is able to self care    ASSESSMENT & PLAN      Diagnosis ICD-10-CM Associated Orders   1. MGUS (monoclonal gammopathy of unknown significance)  D47.2 IgG, IgA, IgM     Immunoglobulin free LT chains blood     Protein electrophoresis, urine     Protein, Total and Protein Electrophoresis with Immunofixation     CBC and differential              This is a 44 y.o. 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    Discussion of decision making    I personally reviewed the following lab results, the image studies, pathology, other specialty/physicians consult notes and recommendations, and outside medical records. I had a lengthy discussion with the patient and shared the work-up findings. We discussed the diagnosis and management plan as below. I spent 32 minutes reviewing the records (labs, clinician notes, outside records,  medical history, monitoring of anti-neoplastic toxicities, ordering medicine/tests/procedures, interpreting the imaging/labs previously done) and coordination of care as well as direct time with the patient today, of which greater than 50% of the time was spent in counseling and coordination of care with the patient/family.    Plan/Labs  If this is MGUS and not MRG, would recommend repeat sFLC, Ig, SPEP/JOSE G 4 months (ordered for early 12/2024)  Skeletal survey to assess for bone lesions  F/u nephrology for MWF dialysis        Follow Up: 3 weeks, then December if light chain involvement of the kidney is ruled out    All questions were answered to the patient's satisfaction during this encounter. The patient knows the contact information for our office and knows to reach out for any relevant concerns related to this encounter. They are to call for any temperature 100.4 or higher, new symptoms including but not restricted to shaking chills, decreased appetite, nausea, vomiting, diarrhea, increased fatigue, shortness of breath or chest pain, confusion, and not feeling the strength to come to the clinic. For all other listed problems and medical diagnosis in their chart - they are managed by PCP and/or other specialists, which the patient acknowledges. Thank you very much for your consultation and making us a part of this patient's care. We are continuing to follow closely with you. Please do not hesitate to reach out to me with any additional questions or concerns.    Brodie Snyder MD  Hematology & Medical Oncology Staff Physician             Disclaimer: This document was prepared using [employer] technology. If a word or phrase is confusing, or does not make sense, this is likely due to recognition error which was not discovered during this clinician's review. If you believe an error has occurred, please contact me through [facility]On service line for sun?cation.      HEMATOLOGICAL HISTORY OF PRESENT  ILLNESS      Clotting History    Bleeding History    Cancer History    Family Cancer History    H/O Blood/Plt Transfusion    Tobacco/etoh/drug abuse            Occupation        2/17/2024: creat 1.22  4/29/2024: creat 2.98  6/3/2024: creat 6.5, K/L 1.37, SPEP 0.26 g/dL IgG Kappa, Hgb 9, MCV 85, plt 327k  CRRT started 6/7/2024 6/9/2024: Hgb 6.7, MCV 89, plt 281k        6/12/2024: BMBx without increased plasma cells    No definitive morphologic or immunophenotypic evidence of plasma cell neoplasm, see comment.  -  FANCD2 mutation of uncertain significance (VAF: 52.5%), see comment.  -  Normocellular bone marrow (60-70% cellularity) with myeloid predominant trilineage maturing hematopoiesis, mild megakaryocytic hyperplasia, and no increase in blasts.  7/4/2024 CTA PE: subsegmental right lower lobe pulmonary embolism  7/22/2024: creat 10.9, WBC 5.69k, Hgb 9.2, plt 184k, MCV 94  7/24/2024: creat 9.46    SUBJECTIVE  (INTERVAL HISTORY)      Energy has improved since leaving the hospital, now getting dialysis MWF    I have reviewed the relevant past medical, surgical, social and family history. I have also reviewed allergies and medications for this patient.      ROS:    Baseline weight; 360-365 lbs    Denies F/C, night sweats, N/V, SOB, CP, LH, HA, rash, itching, gen weakness, melena, hematuria, hematochezia, falls, diarrhea, or constipation     A 10-point review of system was performed, pertinent positive and negative were detailed as above. Otherwise, the 10-point review of system was negative.      Past Medical History:   Diagnosis Date    Acute hypoxic respiratory failure (HCC) 10/07/2023    Arthritis     Breathing difficulty     Cellulitis 10/07/2023    Coronary artery disease     Diabetes mellitus (HCC)     Diabetic neuropathy (HCC) 05/28/2021    Heart disease     CAD   s/p ptca with 1 stent 2012    Hidradenitis suppurativa     groin    History of transfusion     Hyperlipidemia     Hypertension     MI (myocardial  "infarction) ([facility])     \" silent \" M.I. in the past    Morbid obesity with BMI of 50.0-59.9, adult ([facility])     Nicotine dependence     Obesity     Pilonidal cyst     Type 1 non-ST elevation myocardial infarction (NSTEMI) ([facility]) 11/29/2020       Past Surgical History:   Procedure Laterality Date    CARDIAC SURGERY      CORONARY ANGIOPLASTY WITH STENT PLACEMENT      INCISION AND DRAINAGE OF WOUND N/A 1/24/2017    Procedure: INCISION AND DRAINAGE (I&D) BUTTOCK, PILONIDAL CYST;  Surgeon: Simba Adhikari MD;  Location: [facility];  Service:     IR BIOPSY BONE MARROW  6/12/2024    IR BIOPSY KIDNEY RANDOM  7/12/2024    IR BIOPSY KIDNEY RANDOM  7/19/2024    IR TEMPORARY DIALYSIS CATHETER PLACEMENT  6/6/2024    IR TUNNELED CENTRAL LINE PLACEMENT  6/14/2024    LEG SURGERY Left     I&D of left leg 2012    KY DEBRIDEMENT SUBCUTANEOUS TISSUE 1ST 20 SQ CM/< Left 7/6/2021    Procedure: DEBRIDEMENT WOUND (WASH OUT);  Surgeon: Sudheer Mcfarlane MD;  Location: [facility];  Service: General    KY EXC B9 LESION MRGN XCP SK TG T/A/L >4.0 CM Left 4/6/2021    Procedure: EXCISION THIGH MASS;  Surgeon: Sudheer Mcfarlane MD;  [facility];  Service: General    KY EXCISION H/P/P/U COMPLEX REPAIR Left 7/6/2021    Procedure: EXCISION PERINEAL ABSCESS LEFT THIGH;  Surgeon: Sudheer Mcfarlane MD;  [facility];  Service: General    KY NEGATIVE PRESSURE WOUND THERAPY DME <= 50 SQ CM Left 4/6/2021    Procedure: APPLICATION VAC DRESSING THIGH;  Surgeon: Sudheer Mcfarlane MD;  [facility];  Service: General    WOUND DEBRIDEMENT Left 4/17/2021    Procedure: DEBRIDEMENT WOUND AND DRESSING CHANGE (WASH OUT);  Surgeon: Mahin Traore DO;  [facility];  Service: General    WOUND DEBRIDEMENT Left 4/22/2021    Procedure: DEBRIDEMENT LOWER EXTREMITY (WASH OUT), left groin and thigh;  Surgeon: Sudheer Mcfarlane MD;  [facility];  Service: General    WOUND DEBRIDEMENT Left 5/26/2021    Procedure: DEBRIDEMENT LOWER EXTREMITY (WASH OUT);  Surgeon: Zak" DO Kina;  [facility];  Service: General    WOUND DEBRIDEMENT Left 5/28/2021    Procedure: Washout left thigh wound and closure;  Surgeon: Amor Jaramillo DO;  [facility];  Service: General       Family History   Problem Relation Age of Onset    Heart disease Father     Diabetes Father     Hypertension Mother     Heart disease Mother        Social History     Socioeconomic History    Marital status: Single     Spouse name: Not on file    Number of children: 1    Years of education: 12    Highest education level: High school graduate   Occupational History    Not on file   Tobacco Use    Smoking status: Former     Current packs/day: 0.00     Average packs/day: 1.5 packs/day for 20.0 years (29.9 ttl pk-yrs)     Types: Cigarettes     Start date: 01/2021     Quit date: 03/2021     Years since quitting: 3.4     Passive exposure: Past    Smokeless tobacco: Never   Vaping Use    Vaping status: Never Used   Substance and Sexual Activity    Alcohol use: Not Currently     Comment: rarely    Drug use: No    Sexual activity: Not Currently     Partners: Female   Other Topics Concern    Not on file   Social History Narrative    Not on file     Social Determinants of Health     Financial Resource Strain: Not on file   Food Insecurity: No Food Insecurity (7/8/2024)    Hunger Vital Sign     Worried About Running Out of Food in the Last Year: Never true     Ran Out of Food in the Last Year: Never true   Transportation Needs: No Transportation Needs (7/8/2024)    PRAPARE - Transportation     Lack of Transportation (Medical): No     Lack of Transportation (Non-Medical): No   Physical Activity: Not on file   Stress: Not on file   Social Connections: Not on file   Intimate Partner Violence: Not on file   Housing Stability: Low Risk  (7/8/2024)    Housing Stability Vital Sign     Unable to Pay for Housing in the Last Year: No     Number of Times Moved in the Last Year: 0     Homeless in the Last Year: No        Allergies   Allergen Reactions    Keflex [Cephalexin] Facial Swelling and Lip Swelling    Amoxicillin Hives     childhood       Current Outpatient Medications   Medication Sig Dispense Refill    aspirin (ECOTRIN LOW STRENGTH) 81 mg EC tablet Take 1 tablet (81 mg total) by mouth daily 30 tablet 0    atorvastatin (LIPITOR) 80 mg tablet Take 1 tablet (80 mg total) by mouth daily 90 tablet 3    carvedilol (COREG) 6.25 mg tablet Take 1 tablet (6.25 mg total) by mouth 2 (two) times a day with meals 60 tablet 0    cyanocobalamin (VITAMIN B-12) 1000 MCG tablet Take 1 tablet (1,000 mcg total) by mouth daily 30 tablet 0    ferrous sulfate 324 (65 Fe) mg Take 1 tablet (324 mg total) by mouth 2 (two) times a day before meals 60 tablet 0    isosorbide mononitrate (IMDUR) 60 mg 24 hr tablet Take 1 tablet (60 mg total) by mouth daily 30 tablet 0    NIFEdipine (PROCARDIA XL) 30 mg 24 hr tablet Take 1 tablet (30 mg total) by mouth daily 30 tablet 0    sevelamer (RENAGEL) 800 mg tablet Take 1 tablet (800 mg total) by mouth 3 (three) times a day with meals 90 tablet 0    torsemide (DEMADEX) 100 mg tablet Take 1 tablet (100 mg total) by mouth daily 30 tablet 0    warfarin (COUMADIN) 10 mg tablet Take 1 tablet (10 mg total) by mouth daily 30 tablet 0    Ascorbic Acid (vitamin C) 100 MG tablet Take 1 tablet (100 mg total) by mouth daily (Patient not taking: Reported on 7/29/2024) 30 tablet 0    Cholecalciferol (VITAMIN D3) 1,000 units tablet Take 2 tablets (2,000 Units total) by mouth daily 60 tablet 0    glimepiride (AMARYL) 2 mg tablet Take 1 tablet (2 mg total) by mouth daily with breakfast 30 tablet 3    Insulin Pen Needle (BD ULTRA-FINE PEN NEEDLES) 29G X 12.7MM MISC USE 1 NEEDLE ONCE DAILY FOR INJECTION UNDER THE SKIN (Patient not taking: Reported on 7/29/2024) 50 each 3     No current facility-administered medications for this visit.       (Not in a hospital admission)        Objective:     24 Hour Vitals Assessment:      Vitals:    08/02/24 1000   BP: 132/72   Pulse: 65   Resp: 18   Temp: (!) 97.2 °F (36.2 °C)   SpO2: 96%       PHYSICIAN EXAM:    General: Appearance: alert, cooperative, no distress.  HEENT: Normocephalic, atraumatic. No scleral icterus. conjunctivae clear. EOMI.  Chest: No tenderness to palpation. No open wound noted.  Lungs: Clear to auscultation bilaterally, Respirations unlabored.  Cardiac: Regular rate, +S1and S2  Abdomen: Soft, non-tender, non-distended. Bowel sounds are normal.   Extremities:  No edema, cyanosis, clubbing.  Skin: Skin color, turgor are normal. No rashes.  Lymphatics: no palpable supra-cervical, axillary, or inguinal adenopathy  Neurologic: Awake, Alert, and oriented, no gross focal deficits noted b/l.       DATA REVIEW:    Pathology Result:    Final Diagnosis   Date Value Ref Range Status   07/19/2024   Final    A. Kidney, Left:  - As per gross description.        06/12/2024   Final    A -C.  Bone marrow,  left iliac crest,  biopsy, clot, peripheral blood, and aspirate:  -  No definitive morphologic or immunophenotypic evidence of plasma cell neoplasm, see comment.  -  FANCD2 mutation of uncertain significance (VAF: 52.5%), see comment.  -  Normocellular bone marrow (60-70% cellularity) with myeloid predominant trilineage maturing hematopoiesis, mild megakaryocytic hyperplasia, and no increase in blasts.   -  Adequate iron stores.  -  No significance increase in reticulin fibrosis.    Comment: The patient's history of anemia and recently identified monoclonal protein is noted.   The current bone marrow biopsy shows a polyclonal increase in plasma cells with no evidence of light chain restriction by kappa/lambda-MJ. Additionally flow cytometry is negative for a clonal plasma cell population. In the setting of a circulating monoclonal protein, the possibility of a small clonal plasma cell population arising in a background of polyclonal plasma cells cannot be entirely excluded. Therefore,  continued follow-up and monitoring of the peripheral blood by serum protein electrophoresis with heavy and light analysis is advised.    Evaluation of the myeloid lineage is within normal limits at this time. Flow cytometry is negative for myeloid abnormalities. Karyotype analysis identifies a normal male complement and Next generation sequencing is negative for clinically significant mutations.    Finally, Next generation sequencing identifies a FANCD2 mutation of unclear significance (VAF:52.5%). FANC mutations if germline are known to be associated with cancer predisposition syndrome. Therefore, genetic counseling consultation is advised to exclude the possibility of a germline mutation. Clinical correlation is advised.    Dr. Fernández is notified of the findings by Dr. Fabian via [employer] Secure Chat on 6/27/24.     05/16/2024   Final    A. Skin, Cyst/Tag/Debridement, Sebaceous cyst right side of scalp, excision:  - Epidermal (infundibular) cyst.          07/06/2021   Final    A. Skin, Debridement, left groin:  - Portion of skin with abscess surrounded by acute and chronically inflamed granulation tissue and     scar. Correlate with definitive culture results, if available.    -- Scattered foreign body giant cells; no polarizable material.  - Negative for malignancy.         04/06/2021   Final    A. Skin, Left Thigh (Mass), Excision:  - Skin with underlying abscess, reactive changes, and sinus tract formation.            Image Results:   Image result are reviewed and documented in Hematology/Oncology history. I personally reviewed these images.    IR biopsy kidney random  Narrative: CT-scan guided core biopsy of the left kidney    History: CKD now with CLOVIS requiring dialysis.    Conscious sedation time: na    Technique: This examination, like all CT scans performed in the [facility], was performed utilizing techniques to minimize radiation dose exposure, including the use of iterative reconstruction  "and automated exposure control.    The patient was brought to the CT scanner and placed prone on the table. After axial images were obtained through the abdomen, an area of the skin was then marked, prepped, and draped in usual sterile fashion. Lidocaine was administered to the skin and a   small skin incision was made. A 17-gauge cannula was advanced up to the cortex of the left kidney and an 18 gauge core biopsy specimen was obtained. 4 additional passes were made.    The specimen was found to be adequate for Cheyney kit evaluation.    At the end of the procedure, D-Stat was used for hemostasis.    The patient tolerated the procedure well and suffered no complications.  Impression: Impression: Successful percutaneous 18-gauge core biopsy of the left kidney yielding a specimen adequate for Cheyney kit evaluation. A full pathology report will follow.    Workstation performed: [id number]      LABS:  Lab data are reviewed and documented in [facility]On history.       Lab Results   Component Value Date    HGB 9.2 (L) 07/22/2024    HCT 29.2 (L) 07/22/2024    MCV 94 07/22/2024     07/22/2024    WBC 5.69 07/22/2024    NRBC 0 07/12/2024    BANDSPCT 1 06/19/2024    ATYLMPCT 2 (H) 01/30/2024     Lab Results   Component Value Date     12/29/2016    K 5.0 07/24/2024    CL 98 07/24/2024    CO2 29 07/24/2024    BUN 41 (H) 07/24/2024    CREATININE 9.46 (H) 07/24/2024    GLUCOSE 135 05/26/2021    GLUF 157 (H) 01/26/2024    CALCIUM 9.3 07/24/2024    CORRECTEDCA 9.7 07/04/2024    AST 8 (L) 07/24/2024    ALT 6 (L) 07/24/2024    ALKPHOS 64 07/24/2024    PROT 6.9 12/29/2016    BILITOT 0.3 12/29/2016    EGFR 6 07/24/2024       Lab Results   Component Value Date    IRON 38 (L) 06/04/2024    TIBC 226 (L) 06/04/2024    FERRITIN 69 06/04/2024    FERRITIN 38 04/01/2022       Lab Results   Component Value Date    TDFXKCYF03 310 06/04/2024       No results for input(s): \"WBC\", \"CREAT\", \"PLT\" in the last 72 hours.       By:  Brodie KITCHEN" MD Claudio, 8/2/2024, 10:22 AM

## 2024-08-02 NOTE — TELEPHONE ENCOUNTER
----- Message from Hany Mcfarland DO sent at 8/1/2024 10:39 PM EDT -----  Regarding: FW: INR/Warfarin follow up and Nephrology referral  Please let pt know that he does need a nephrologist but if he is getting dialysis, the nephrologist on staff at the dialysis center is his nephrologist and getting a separate one that does not manage his dialysis would be a conflict in management.  This is my understanding about how it works.  ----- Message -----  From: Yoli Carter RN  Sent: 7/29/2024  11:25 AM EDT  To: Hany Mcfarland DO; #  Subject: INR/Warfarin follow up and Nephrology referr#    Good morning! I just spoke with Vineet and he seems to be doing well. I see he has an apt next week on 8/7. He was started on Warfarin for PE and had INR drawn today. He says he was supposed to f/u with his PCP. He also recently started on hemodialysis and he has not seen a Nephrologist as an OP. He says he is not sure who he should see or where to go. He says he wants to stay near home. Can the office please take care of these 2 things? Please advise. Thanks! Yoli

## 2024-08-04 LAB
ALBUMIN SERPL-MCNC: 3.7 G/DL (ref 4.1–5.1)
ALP SERPL-CCNC: 82 IU/L (ref 44–121)
ALT SERPL-CCNC: 18 IU/L (ref 0–44)
AST SERPL-CCNC: 12 IU/L (ref 0–40)
BILIRUB SERPL-MCNC: 0.3 MG/DL (ref 0–1.2)
BUN SERPL-MCNC: 37 MG/DL (ref 6–24)
BUN/CREAT SERPL: 6 (ref 9–20)
CALCIUM SERPL-MCNC: 9.1 MG/DL (ref 8.7–10.2)
CHLORIDE SERPL-SCNC: 100 MMOL/L (ref 96–106)
CHOLEST SERPL-MCNC: 101 MG/DL (ref 100–199)
CO2 SERPL-SCNC: 27 MMOL/L (ref 20–29)
CREAT SERPL-MCNC: 6.56 MG/DL (ref 0.76–1.27)
EGFR: 10 ML/MIN/1.73
GLOBULIN SER-MCNC: 4 G/DL (ref 1.5–4.5)
GLUCOSE SERPL-MCNC: 108 MG/DL (ref 70–99)
HDLC SERPL-MCNC: 25 MG/DL
INR PPP: 2.1 (ref 0.9–1.2)
LDLC SERPL CALC-MCNC: 53 MG/DL (ref 0–99)
LDLC/HDLC SERPL: 2.1 RATIO (ref 0–3.6)
MICRODELETION SYND BLD/T FISH: NORMAL
MICRODELETION SYND BLD/T FISH: NORMAL
POTASSIUM SERPL-SCNC: 4.1 MMOL/L (ref 3.5–5.2)
PROT SERPL-MCNC: 7.7 G/DL (ref 6–8.5)
PROTHROMBIN TIME: 20.9 SEC (ref 9.1–12)
SL AMB VLDL CHOLESTEROL CALC: 23 MG/DL (ref 5–40)
SODIUM SERPL-SCNC: 140 MMOL/L (ref 134–144)
TRIGL SERPL-MCNC: 125 MG/DL (ref 0–149)

## 2024-08-05 ENCOUNTER — ANTICOAG VISIT (OUTPATIENT)
Dept: FAMILY MEDICINE CLINIC | Facility: CLINIC | Age: 44
End: 2024-08-05

## 2024-08-05 ENCOUNTER — PATIENT OUTREACH (OUTPATIENT)
Dept: CASE MANAGEMENT | Facility: HOSPITAL | Age: 44
End: 2024-08-05

## 2024-08-05 ENCOUNTER — TRANSCRIBE ORDERS (OUTPATIENT)
Dept: ADMINISTRATIVE | Facility: HOSPITAL | Age: 44
End: 2024-08-05

## 2024-08-05 ENCOUNTER — TELEPHONE (OUTPATIENT)
Dept: FAMILY MEDICINE CLINIC | Facility: CLINIC | Age: 44
End: 2024-08-05

## 2024-08-05 DIAGNOSIS — Z99.2 ESRD (END STAGE RENAL DISEASE) ON DIALYSIS (HCC): Primary | ICD-10-CM

## 2024-08-05 DIAGNOSIS — N18.6 ESRD (END STAGE RENAL DISEASE) ON DIALYSIS (HCC): Primary | ICD-10-CM

## 2024-08-05 PROCEDURE — 3066F NEPHROPATHY DOC TX: CPT | Performed by: SURGERY

## 2024-08-05 NOTE — TELEPHONE ENCOUNTER
Spoke with pt  He will continue same dose and recheck INR in 1 week   Also aware of other lab results.  CODY williamson

## 2024-08-05 NOTE — TELEPHONE ENCOUNTER
Left message for patient to call back  Please go over INR and update calendar as well.    Trudy Alejandre CMA

## 2024-08-05 NOTE — TELEPHONE ENCOUNTER
----- Message from Hany Mcfarland DO sent at 8/4/2024  7:45 PM EDT -----  Please call about recent results:    INR is good, between 2 and 3  Stay on same dose warfarin  Repeat INR in 1 week  Keep scheduled appt this week  Sugar and cholesterol are good  ----- Message -----  From: [facility] Results In  Sent: 7/29/2024   6:06 PM EDT  To: Hany Mcfarland DO

## 2024-08-05 NOTE — PROGRESS NOTES
Chart reviewed. Patients Warfarin and INR is being managed by his PCP. INR was 2.1 on 8/3. Goal INR is 2.0-3.0. Patient instructed to stay on same dose and repeat INR in 1 week. Instructed to keep apt scheduled this week. (Sugar and cholesterol good per office note). Scheduled apt's: PCP 8/7, [facility] transplant team 8/15, Vascular 8/16, Hematology/Oncology 8/26, Endocrinology 9/30.  Patient on Dialysis MWF [facility].    Patient should follow up with nephrologist at his Dialysis Clinic as OP.    This RNCM attempted to call patient. There was no answer and a detailed message was left with a request for a call back.    RNCM to follow.

## 2024-08-07 ENCOUNTER — OFFICE VISIT (OUTPATIENT)
Dept: FAMILY MEDICINE CLINIC | Facility: CLINIC | Age: 44
End: 2024-08-07
Payer: COMMERCIAL

## 2024-08-07 VITALS
HEIGHT: 67 IN | DIASTOLIC BLOOD PRESSURE: 78 MMHG | HEART RATE: 72 BPM | WEIGHT: 315 LBS | RESPIRATION RATE: 18 BRPM | BODY MASS INDEX: 49.44 KG/M2 | SYSTOLIC BLOOD PRESSURE: 134 MMHG | TEMPERATURE: 96.9 F

## 2024-08-07 DIAGNOSIS — N18.6 TYPE 2 DIABETES MELLITUS WITH CHRONIC KIDNEY DISEASE ON CHRONIC DIALYSIS, WITHOUT LONG-TERM CURRENT USE OF INSULIN (HCC): Primary | ICD-10-CM

## 2024-08-07 DIAGNOSIS — E11.22 TYPE 2 DIABETES MELLITUS WITH CHRONIC KIDNEY DISEASE ON CHRONIC DIALYSIS, WITHOUT LONG-TERM CURRENT USE OF INSULIN (HCC): Primary | ICD-10-CM

## 2024-08-07 DIAGNOSIS — I10 PRIMARY HYPERTENSION: ICD-10-CM

## 2024-08-07 DIAGNOSIS — Z99.2 TYPE 2 DIABETES MELLITUS WITH CHRONIC KIDNEY DISEASE ON CHRONIC DIALYSIS, WITHOUT LONG-TERM CURRENT USE OF INSULIN (HCC): Primary | ICD-10-CM

## 2024-08-07 DIAGNOSIS — I26.99 ACUTE PULMONARY EMBOLISM, UNSPECIFIED PULMONARY EMBOLISM TYPE, UNSPECIFIED WHETHER ACUTE COR PULMONALE PRESENT (HCC): ICD-10-CM

## 2024-08-07 DIAGNOSIS — I25.119 CORONARY ARTERY DISEASE INVOLVING NATIVE CORONARY ARTERY OF NATIVE HEART WITH ANGINA PECTORIS (HCC): ICD-10-CM

## 2024-08-07 DIAGNOSIS — I89.0 CHRONIC ACQUIRED LYMPHEDEMA: ICD-10-CM

## 2024-08-07 DIAGNOSIS — Z13.1 SCREENING FOR DIABETES MELLITUS (DM): ICD-10-CM

## 2024-08-07 DIAGNOSIS — E78.49 OTHER HYPERLIPIDEMIA: ICD-10-CM

## 2024-08-07 DIAGNOSIS — D63.8 ANEMIA OF CHRONIC DISEASE: ICD-10-CM

## 2024-08-07 DIAGNOSIS — E66.01 MORBID OBESITY (HCC): ICD-10-CM

## 2024-08-07 DIAGNOSIS — Z99.2 ESRD (END STAGE RENAL DISEASE) ON DIALYSIS (HCC): ICD-10-CM

## 2024-08-07 DIAGNOSIS — N18.6 ESRD (END STAGE RENAL DISEASE) ON DIALYSIS (HCC): ICD-10-CM

## 2024-08-07 PROBLEM — Z91.148 NONCOMPLIANCE WITH DIET AND MEDICATION REGIMEN: Status: RESOLVED | Noted: 2024-05-03 | Resolved: 2024-08-07

## 2024-08-07 PROBLEM — E11.9 DIABETES (HCC): Status: RESOLVED | Noted: 2018-07-26 | Resolved: 2024-08-07

## 2024-08-07 PROBLEM — Z91.199 NONCOMPLIANCE WITH DIET AND MEDICATION REGIMEN: Status: RESOLVED | Noted: 2024-05-03 | Resolved: 2024-08-07

## 2024-08-07 PROBLEM — L03.116 CELLULITIS OF LEFT LOWER EXTREMITY: Status: RESOLVED | Noted: 2021-06-23 | Resolved: 2024-08-07

## 2024-08-07 PROBLEM — E11.9 TYPE 2 DIABETES MELLITUS (HCC): Status: RESOLVED | Noted: 2024-07-05 | Resolved: 2024-08-07

## 2024-08-07 PROBLEM — D64.9 ANEMIA: Chronic | Status: RESOLVED | Noted: 2024-07-05 | Resolved: 2024-08-07

## 2024-08-07 PROBLEM — R07.9 CHEST PAIN: Status: RESOLVED | Noted: 2021-12-02 | Resolved: 2024-08-07

## 2024-08-07 PROBLEM — N17.9 ACUTE RENAL FAILURE (ARF) (HCC): Status: RESOLVED | Noted: 2021-02-22 | Resolved: 2024-08-07

## 2024-08-07 PROBLEM — S31.109A WOUND OF ABDOMEN: Status: RESOLVED | Noted: 2024-06-05 | Resolved: 2024-08-07

## 2024-08-07 PROCEDURE — 99495 TRANSJ CARE MGMT MOD F2F 14D: CPT | Performed by: FAMILY MEDICINE

## 2024-08-07 NOTE — PROGRESS NOTES
Assessment/Plan:    No problem-specific Assessment & Plan notes found for this encounter.    Check DM labs before endo appt   Currenlty off all DM meds  Has lost wt    PE  Provoked  6m AC plan started 7/26/24    Anemia  Getting venofer  F/u hematology    Bmi aware    ESRD on dialysis, f/u nephro    Lymphedema LLE unchanged     Diagnoses and all orders for this visit:    Screening for diabetes mellitus (DM)  -     Comprehensive metabolic panel; Future  -     Comprehensive metabolic panel    Type 2 diabetes mellitus with chronic kidney disease on chronic dialysis, without long-term current use of insulin (HCC)  -     Hemoglobin A1C; Future  -     Fructosamine; Future  -     Hemoglobin A1C  -     Fructosamine    Acute pulmonary embolism, unspecified pulmonary embolism type, unspecified whether acute cor pulmonale present (HCC)  -     Protime-INR; Standing  -     Protime-INR    Anemia, unspecified type  -     CBC and differential; Future  -     CBC and differential    ESRD (end stage renal disease) on dialysis (HCC)              Return in about 3 months (around 11/7/2024) for Recheck.    Subjective:      Patient ID: Joaquin Frankel is a 44 y.o. male.    Chief Complaint   Patient presents with    Transition of Care Management     [facility] f/u Natalia Block LPN         HPI  TCM Call       Date and time call was made  7/29/2024  8:39 AM    Hospital care reviewed  Records reviewed    Patient was hospitialized at  [facility]    Date of Admission  07/04/24    Date of discharge  07/26/24    Diagnosis  Pulmonary embolism    Disposition  Home    Were the patients medications reviewed and updated  No  He states that he knows what to take but will call if any questions    Current Symptoms  None    Left side leg pain severity  Mild    Leg pain, left side, onset  Other (comment)    Right side leg pain severity  Mild    Leg pain, right side, onset  Other (comment)          TCM Call       Post hospital issues  None     Should patient be enrolled in anticoag monitoring?  Yes  His going this am for his INR and Dr Mcfarland will manage    Scheduled for follow up?  Yes    Patient refusal reason  Patient stated he wanted to follow up with General Surgery    Patients specialists  Other (comment)    Cardiologist name  Dr Fulton    Endocrinologist contact #  Dr Cardozo    Other specialists names  PETRA Lau (Wound Care)- He refuses wound care. He has a small wound on the back of his left leg which he states is healing    Referrals needed  Needs Referrals to Pulmonoly, Cardiology, General Surgery and Vascular Surgery    Did you obtain your prescribed medications  Yes    Do you need help managing your prescriptions or medications  No    Is transportation to your appointment needed  No    I have advised the patient to call PCP with any new or worsening symptoms  Last Cherry    Living Arrangements  Children    Support System  Family    The type of support provided  Physical; Emotional    Do you have social support  Yes, as much as I need    Are you recieving any outpatient services  No    What type of services  Dialysis, Wound Care Center    Are you recieving home care services  No    Are you using any community resources  No    Current waiver services  No    Have you fallen in the last 12 months  No    Interperter language line needed  No    Counseling  Patient    Counseling topics  Activities of daily living; Importance of RX compliance; instructions for management; Risk factor reduction; patient and family education    Comments  Joaquin states that he is doing fine. No c/o at this time. He knows to go to the ER if any chest pain, dyspnea, weakness, etc Last Cherry          Has dialysis, nephrologist in [facility]  Works in [place]  On warfarin  No bleeding noted in stool/urine  Low Vit K diet aware  10mg/d warfarin currently  No cp  Doing smbg  Fbs 110 today  Under 130 all day  Last glimepiride dose was June 2024  Has anemia  Had  prbc transfusions  Has iv iron at dialysis    Has chaz dx questioned  Not on oxygen anymore, was on at night  Needs chaz testing/referral    Lost wt, about 60#  Breathing better  Has fatigue but better    The following portions of the patient's history were reviewed and updated as appropriate: allergies, current medications, past family history, past medical history, past social history, past surgical history and problem list.    Review of Systems   Constitutional:  Negative for chills and fever.   Cardiovascular:  Positive for leg swelling. Negative for chest pain.   Gastrointestinal:  Negative for blood in stool.   Genitourinary:  Negative for dysuria and hematuria.         Current Outpatient Medications   Medication Sig Dispense Refill    aspirin (ECOTRIN LOW STRENGTH) 81 mg EC tablet Take 1 tablet (81 mg total) by mouth daily 30 tablet 0    atorvastatin (LIPITOR) 80 mg tablet Take 1 tablet (80 mg total) by mouth daily 90 tablet 3    b complex-vitamin C-folic acid (RENAL) 1 mg Take 1 capsule by mouth daily with dinner 30 capsule 8    carvedilol (COREG) 6.25 mg tablet Take 1 tablet (6.25 mg total) by mouth 2 (two) times a day with meals 60 tablet 0    Cholecalciferol (VITAMIN D3) 1,000 units tablet Take 2 tablets (2,000 Units total) by mouth daily 60 tablet 0    ferrous sulfate 324 (65 Fe) mg Take 1 tablet (324 mg total) by mouth 2 (two) times a day before meals 60 tablet 0    glimepiride (AMARYL) 2 mg tablet Take 1 tablet (2 mg total) by mouth daily with breakfast 30 tablet 3    isosorbide mononitrate (IMDUR) 60 mg 24 hr tablet Take 1 tablet (60 mg total) by mouth daily 30 tablet 0    NIFEdipine (PROCARDIA XL) 30 mg 24 hr tablet Take 1 tablet (30 mg total) by mouth daily 30 tablet 0    sevelamer (RENAGEL) 800 mg tablet Take 1 tablet (800 mg total) by mouth 3 (three) times a day with meals 90 tablet 0    torsemide (DEMADEX) 100 mg tablet Take 1 tablet (100 mg total) by mouth daily 30 tablet 0    warfarin (COUMADIN)  "10 mg tablet Take 1 tablet (10 mg total) by mouth daily 30 tablet 0     No current facility-administered medications for this visit.       Objective:    /78   Pulse 72   Temp (!) 96.9 °F (36.1 °C)   Resp 18   Ht 5' 7\" (1.702 m)   Wt (!) 163 kg (359 lb 12.8 oz)   BMI 56.35 kg/m²        Physical Exam  Vitals and nursing note reviewed.   Constitutional:       General: He is not in acute distress.     Appearance: He is well-developed. He is obese. He is not ill-appearing.   HENT:      Head: Normocephalic.      Right Ear: Tympanic membrane normal.      Left Ear: Tympanic membrane normal.      Nose: No congestion.   Eyes:      General: No scleral icterus.     Conjunctiva/sclera: Conjunctivae normal.   Neck:      Vascular: No carotid bruit.   Cardiovascular:      Rate and Rhythm: Normal rate and regular rhythm.      Heart sounds: No murmur heard.  Pulmonary:      Effort: Pulmonary effort is normal. No respiratory distress.      Breath sounds: No wheezing.   Abdominal:      Palpations: Abdomen is soft.      Tenderness: There is no abdominal tenderness.   Musculoskeletal:         General: No deformity.      Cervical back: Neck supple.      Right lower leg: No edema.      Left lower leg: Edema present.   Skin:     General: Skin is warm and dry.      Coloration: Skin is not pale.   Neurological:      Mental Status: He is alert.      Motor: No weakness.      Gait: Gait normal.   Psychiatric:         Mood and Affect: Mood normal.         Behavior: Behavior normal.         Thought Content: Thought content normal.                Hany Mcfarland DO    "

## 2024-08-12 ENCOUNTER — ANTICOAG VISIT (OUTPATIENT)
Dept: FAMILY MEDICINE CLINIC | Facility: CLINIC | Age: 44
End: 2024-08-12

## 2024-08-13 ENCOUNTER — PATIENT OUTREACH (OUTPATIENT)
Dept: CASE MANAGEMENT | Facility: HOSPITAL | Age: 44
End: 2024-08-13

## 2024-08-13 ENCOUNTER — TELEPHONE (OUTPATIENT)
Dept: FAMILY MEDICINE CLINIC | Facility: CLINIC | Age: 44
End: 2024-08-13

## 2024-08-13 NOTE — TELEPHONE ENCOUNTER
----- Message from Yoli RAMOS sent at 8/13/2024 12:27 PM EDT -----  Regarding: REFILLS  Hi all! I just spoke with patient and he needs refills on a bunch of medications:  Vit D  Ferrous Sulfate  Isosorbide  Nifedipine  Renagel  Torsemide  Warfarin     Patient says he is not taking Glimepiride. He says office aware he has not taken it in 2 months and is having a repeat A1C and another test next month? Can you please clarify. Thanks! Yoli

## 2024-08-13 NOTE — PROGRESS NOTES
Chart reviewed. Patient had his PCP RAMYA apt on 8/7.      This RNCM called patient for follow up. Patient says he went back to work last Monday. Patient says this has been a lot for him and he doesn't think he will be able to continue to work. He works [employer] and has HD MWF with chair time at 4 PM. Patient says he has been feeling a little under the weather since the weekend. He says he had some chills and blood in his urine which cleared up. He states they weren't concerned but will follow. He denies any CP, chest tightness or SOB. Patient has his usal swelling h/o lymphedema.     We reviewed patients medications. He states he needs a lot of refills.  Patient needs refills on:  Vit D  Ferrous Sulfate  Isosorbide  Nifedipine  Renagel  Torsemide  Warfarin     INR was done this past Saturday and will be done again in 2 weeks    Patient is not taking Glimepiride 3-4 months having A1C and Fructose test in Sept-he says his PCP is aware of this and following.    Patient has an apt this week on 8/15 at [facility] for transplant education    Patient denies any needs at this time other then refills. I confirmed he has my contact information and encouraged him to call with any questions or concerns.     An IB message sent to PCP and office staff regarding above need for refills and Glimepiride.     RNCM to follow.

## 2024-08-14 ENCOUNTER — TELEPHONE (OUTPATIENT)
Dept: FAMILY MEDICINE CLINIC | Facility: CLINIC | Age: 44
End: 2024-08-14

## 2024-08-14 DIAGNOSIS — D63.8 ANEMIA OF CHRONIC DISEASE: ICD-10-CM

## 2024-08-14 DIAGNOSIS — I26.99 PULMONARY EMBOLISM (HCC): ICD-10-CM

## 2024-08-14 DIAGNOSIS — E55.9 VITAMIN D DEFICIENCY: ICD-10-CM

## 2024-08-14 RX ORDER — WARFARIN SODIUM 10 MG/1
10 TABLET ORAL
Qty: 30 TABLET | Refills: 5 | Status: SHIPPED | OUTPATIENT
Start: 2024-08-14

## 2024-08-14 RX ORDER — FERROUS SULFATE 324(65)MG
324 TABLET, DELAYED RELEASE (ENTERIC COATED) ORAL
Qty: 60 TABLET | Refills: 5 | Status: SHIPPED | OUTPATIENT
Start: 2024-08-14

## 2024-08-14 NOTE — TELEPHONE ENCOUNTER
----- Message from Hany Mcfarland DO sent at 8/14/2024  1:00 AM EDT -----  Regarding: FW: REFILLS    Please let him know that I can refill his vitamin D and iron and warfarin BUT.....    His cardiologist manages his heart disease and hypertension so he needs to contact them for:  Isosorbide  Nifedipine    His nephrologist manages his kidneys and fluid balance so he needs to contact them for:  Renagel  Torsemide  ----- Message -----  From: Joaquina Whiteside MA  Sent: 8/13/2024   1:22 PM EDT  To: Hany Mcfarland DO  Subject: FW: REFILLS                                        ----- Message -----  From: Yoli Carter RN  Sent: 8/13/2024  12:29 PM EDT  To: Hany Mcfarland DO; #  Subject: REFILLS                                          Hi all! I just spoke with patient and he needs refills on a bunch of medications:  Vit D  Ferrous Sulfate  Isosorbide  Nifedipine  Renagel  Torsemide  Warfarin     Patient says he is not taking Glimepiride. He says office aware he has not taken it in 2 months and is having a repeat A1C and another test next month? Can you please clarify. Thanks! Yoli

## 2024-08-15 DIAGNOSIS — I25.119 CORONARY ARTERY DISEASE INVOLVING NATIVE CORONARY ARTERY OF NATIVE HEART WITH ANGINA PECTORIS (HCC): ICD-10-CM

## 2024-08-15 DIAGNOSIS — I10 PRIMARY HYPERTENSION: ICD-10-CM

## 2024-08-15 RX ORDER — TORSEMIDE 100 MG/1
100 TABLET ORAL DAILY
Qty: 30 TABLET | Refills: 0 | Status: SHIPPED | OUTPATIENT
Start: 2024-08-15 | End: 2024-09-14

## 2024-08-15 RX ORDER — NIFEDIPINE 30 MG/1
30 TABLET, EXTENDED RELEASE ORAL DAILY
Qty: 30 TABLET | Refills: 0 | Status: SHIPPED | OUTPATIENT
Start: 2024-08-15 | End: 2024-09-14

## 2024-08-15 RX ORDER — CARVEDILOL 6.25 MG/1
6.25 TABLET ORAL 2 TIMES DAILY WITH MEALS
Qty: 60 TABLET | Refills: 0 | Status: SHIPPED | OUTPATIENT
Start: 2024-08-15 | End: 2024-09-14

## 2024-08-15 RX ORDER — ISOSORBIDE MONONITRATE 60 MG/1
60 TABLET, EXTENDED RELEASE ORAL DAILY
Qty: 30 TABLET | Refills: 0 | Status: SHIPPED | OUTPATIENT
Start: 2024-08-15

## 2024-08-15 NOTE — TELEPHONE ENCOUNTER
Reason for call:   [x] Refill   [] Prior Auth  [x] Other: Patient is asking that Gino Fulton MD, take over on these scripts as they were last given in the ED.      Office:   [] PCP/Provider -   [x] Specialty/Provider - Gino Fulton MD/Cardiology      Medication:       Does the patient have enough for 3 days?   [] Yes   [x] No - Send as HP to POD

## 2024-08-20 NOTE — PROGRESS NOTES
Telemedicine consent    Patient: Joaquin Frankel  Provider: Brodie Snyder MD  Provider located at [address] [place] [address] [facility] [address] [address]    The patient was identified by name and date of birth. Joaquin Frankel was informed that this is a telemedicine visit and that the visit is being conducted through the [employer] platform. He agrees to proceed..  My office door was closed. No one else was in the room.  He acknowledged consent and understanding of privacy and security of the video platform. The patient has agreed to participate and understands they can discontinue the visit at any time.    Patient is aware this is a billable service.                                    Hematology Outpatient Office Note    Date of Service: 2024    [facility] [address] [address] [phone]    Reason for Consultation:   No chief complaint on file.      Referral Physician: No ref. provider found    Primary Care Physician:  Hany Mcfarland DO     Nickname: Joaquin    Son lives with him, Elver     Original ECO    Today's ECO    Goals and Barriers:  Current Goal: Minimize effects of disease burden, extend life.   Barriers to accomplishing this: None    Patient's Capacity to Self Care:  Patient is able to self care    ASSESSMENT & PLAN      Diagnosis ICD-10-CM Associated Orders   1. MGUS (monoclonal gammopathy of unknown significance)  D47.2               This is a 44 y.o. 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    Discussion of decision making    I personally reviewed the following lab results, the image studies, pathology, other specialty/physicians consult notes and recommendations, and outside medical records. I had a lengthy discussion with the patient and shared the work-up findings. We discussed the diagnosis and management plan as below. I spent 32 minutes reviewing the records (labs, clinician notes, outside records, medical history, monitoring of anti-neoplastic toxicities, ordering medicine/tests/procedures, interpreting the imaging/labs previously done) and coordination of care as well as direct time with the patient today, of which greater than 50% of the time was spent in counseling and coordination of care with the patient/family.    Plan/Labs  As this is MGUS and not MRG, would recommend repeat sFLC, Ig, SPEP/JOSE G in 4 months (ordered for early 12/2024)  F/u nephrology for MWF dialysis  July 19 biopsy from [facility] is reassuring for only Nodular diabetic glomerulosclerosis        Follow Up:  December 2024 as light chain involvement of the kidney was ruled out    All questions were answered to the patient's satisfaction during this encounter. The patient knows the contact information for our office and knows to reach out for any relevant concerns related to this encounter. They are to call for any temperature 100.4 or higher, new symptoms including but not restricted to shaking chills, decreased appetite, nausea, vomiting, diarrhea, increased fatigue, shortness of breath or chest pain, confusion, and not feeling the strength to come to the clinic. For all other listed problems and medical diagnosis in their chart - they are managed by PCP and/or other specialists, which the patient acknowledges. Thank you very much for your consultation and making us a part of this patient's care. We are continuing to  follow closely with you. Please do not hesitate to reach out to me with any additional questions or concerns.    Brodie Snyder MD  Hematology & Medical Oncology Staff Physician             Disclaimer: This document was prepared using [employer] technology. If a word or phrase is confusing, or does not make sense, this is likely due to recognition error which was not discovered during this clinician's review. If you believe an error has occurred, please contact me through [facility] service line for sun?cation.      HEMATOLOGICAL HISTORY OF PRESENT ILLNESS      Clotting History    Bleeding History    Cancer History    Family Cancer History    H/O Blood/Plt Transfusion    Tobacco/etoh/drug abuse            Occupation        2/17/2024: creat 1.22  4/29/2024: creat 2.98  6/3/2024: creat 6.5, K/L 1.37, SPEP 0.26 g/dL IgG Kappa, Hgb 9, MCV 85, plt 327k  CRRT started 6/7/2024 6/9/2024: Hgb 6.7, MCV 89, plt 281k        6/12/2024: BMBx without increased plasma cells    No definitive morphologic or immunophenotypic evidence of plasma cell neoplasm, see comment.  -  FANCD2 mutation of uncertain significance (VAF: 52.5%), see comment.  -  Normocellular bone marrow (60-70% cellularity) with myeloid predominant trilineage maturing hematopoiesis, mild megakaryocytic hyperplasia, and no increase in blasts.  7/4/2024 CTA PE: subsegmental right lower lobe pulmonary embolism  7/22/2024: creat 10.9, WBC 5.69k, Hgb 9.2, plt 184k, MCV 94  7/24/2024: creat 9.46    No new issues, doing well    SUBJECTIVE  (INTERVAL HISTORY)      Energy has improved since leaving the hospital, now getting dialysis MWF    I have reviewed the relevant past medical, surgical, social and family history. I have also reviewed allergies and medications for this patient.      ROS:    Baseline weight; 360-365 lbs    Denies F/C, night sweats, N/V, SOB, CP, LH, HA, rash, itching, gen weakness, melena, hematuria, hematochezia, falls, diarrhea, or  "constipation     A 10-point review of system was performed, pertinent positive and negative were detailed as above. Otherwise, the 10-point review of system was negative.      Past Medical History:   Diagnosis Date    Acute hypoxic respiratory failure ([facility]) 10/07/2023    Arthritis     Breathing difficulty     Cellulitis 10/07/2023    Coronary artery disease     Diabetes mellitus ([facility])     Diabetic neuropathy ([facility]) 05/28/2021    Heart disease     CAD   s/p ptca with 1 stent 2012    Hidradenitis suppurativa     groin    History of transfusion     Hyperlipidemia     Hypertension     MI (myocardial infarction) ([facility])     \" silent \" M.I. in the past    Morbid obesity with BMI of 50.0-59.9, adult ([facility])     Nicotine dependence     Obesity     Pilonidal cyst     Type 1 non-ST elevation myocardial infarction (NSTEMI) ([facility]) 11/29/2020       Past Surgical History:   Procedure Laterality Date    CARDIAC SURGERY      CORONARY ANGIOPLASTY WITH STENT PLACEMENT      INCISION AND DRAINAGE OF WOUND N/A 1/24/2017    Procedure: INCISION AND DRAINAGE (I&D) BUTTOCK, PILONIDAL CYST;  Surgeon: Simba Adhikari MD;  Location: [facility];  Service:     IR BIOPSY BONE MARROW  6/12/2024    IR BIOPSY KIDNEY RANDOM  7/12/2024    IR BIOPSY KIDNEY RANDOM  7/19/2024    IR TEMPORARY DIALYSIS CATHETER PLACEMENT  6/6/2024    IR TUNNELED CENTRAL LINE PLACEMENT  6/14/2024    LEG SURGERY Left     I&D of left leg 2012    GA DEBRIDEMENT SUBCUTANEOUS TISSUE 1ST 20 SQ CM/< Left 7/6/2021    Procedure: DEBRIDEMENT WOUND (WASH OUT);  Surgeon: Sudheer Mcfarlane MD;  Location: [facility];  Service: General    GA EXC B9 LESION MRGN XCP SK TG T/A/L >4.0 CM Left 4/6/2021    Procedure: EXCISION THIGH MASS;  Surgeon: Sudheer Mcfarlane MD;  [facility];  Service: General    GA EXCISION H/P/P/U COMPLEX REPAIR Left 7/6/2021    Procedure: EXCISION PERINEAL ABSCESS LEFT THIGH;  Surgeon: Sudheer Mcfarlane MD;  [facility];  Service: General    GA NEGATIVE PRESSURE WOUND THERAPY DME " <= 50 SQ CM Left 4/6/2021    Procedure: APPLICATION VAC DRESSING THIGH;  Surgeon: Sudheer Mcfarlane MD;  [facility];  Service: General    WOUND DEBRIDEMENT Left 4/17/2021    Procedure: DEBRIDEMENT WOUND AND DRESSING CHANGE (WASH OUT);  Surgeon: Mahin Traore DO;  [facility];  Service: General    WOUND DEBRIDEMENT Left 4/22/2021    Procedure: DEBRIDEMENT LOWER EXTREMITY (WASH OUT), left groin and thigh;  Surgeon: Sudheer Mcfarlane MD;  [facility];  Service: General    WOUND DEBRIDEMENT Left 5/26/2021    Procedure: DEBRIDEMENT LOWER EXTREMITY (WASH OUT);  Surgeon: Zak Castanon DO;  [facility];  Service: General    WOUND DEBRIDEMENT Left 5/28/2021    Procedure: Washout left thigh wound and closure;  Surgeon: Amor Jaramillo DO;  [facility];  Service: General       Family History   Problem Relation Age of Onset    Heart disease Father     Diabetes Father     Hypertension Mother     Heart disease Mother        Social History     Socioeconomic History    Marital status: Single     Spouse name: Not on file    Number of children: 1    Years of education: 12    Highest education level: High school graduate   Occupational History    Not on file   Tobacco Use    Smoking status: Former     Current packs/day: 0.00     Average packs/day: 1.5 packs/day for 20.0 years (29.9 ttl pk-yrs)     Types: Cigarettes     Start date: 01/2021     Quit date: 03/2021     Years since quitting: 3.4     Passive exposure: Past    Smokeless tobacco: Never   Vaping Use    Vaping status: Never Used   Substance and Sexual Activity    Alcohol use: Not Currently     Comment: rarely    Drug use: No    Sexual activity: Not Currently     Partners: Female   Other Topics Concern    Not on file   Social History Narrative    Not on file     Social Determinants of Health     Financial Resource Strain: Not on file   Food Insecurity: No Food Insecurity (7/8/2024)    Hunger Vital Sign     Worried About Running Out of  Food in the Last Year: Never true     Ran Out of Food in the Last Year: Never true   Transportation Needs: No Transportation Needs (7/8/2024)    PRAPARE - Transportation     Lack of Transportation (Medical): No     Lack of Transportation (Non-Medical): No   Physical Activity: Not on file   Stress: Not on file   Social Connections: Not on file   Intimate Partner Violence: Not on file   Housing Stability: Low Risk  (7/8/2024)    Housing Stability Vital Sign     Unable to Pay for Housing in the Last Year: No     Number of Times Moved in the Last Year: 0     Homeless in the Last Year: No       Allergies   Allergen Reactions    Keflex [Cephalexin] Facial Swelling and Lip Swelling    Amoxicillin Hives     childhood       Current Outpatient Medications   Medication Sig Dispense Refill    aspirin (ECOTRIN LOW STRENGTH) 81 mg EC tablet Take 1 tablet (81 mg total) by mouth daily 30 tablet 0    atorvastatin (LIPITOR) 80 mg tablet Take 1 tablet (80 mg total) by mouth daily 90 tablet 3    b complex-vitamin C-folic acid (RENAL) 1 mg Take 1 capsule by mouth daily with dinner 30 capsule 8    carvedilol (COREG) 6.25 mg tablet Take 1 tablet (6.25 mg total) by mouth 2 (two) times a day with meals 60 tablet 0    Cholecalciferol (VITAMIN D3) 1,000 units tablet Take 2 tablets (2,000 Units total) by mouth daily 60 tablet 5    ferrous sulfate 324 (65 Fe) mg Take 1 tablet (324 mg total) by mouth 2 (two) times a day before meals 60 tablet 5    glimepiride (AMARYL) 2 mg tablet Take 1 tablet (2 mg total) by mouth daily with breakfast (Patient not taking: Reported on 8/13/2024) 30 tablet 3    isosorbide mononitrate (IMDUR) 60 mg 24 hr tablet Take 1 tablet (60 mg total) by mouth daily 30 tablet 0    NIFEdipine (PROCARDIA XL) 30 mg 24 hr tablet Take 1 tablet (30 mg total) by mouth daily 30 tablet 0    sevelamer (RENAGEL) 800 mg tablet Take 1 tablet (800 mg total) by mouth 3 (three) times a day with meals 90 tablet 5    torsemide (DEMADEX) 100 mg  tablet Take 1 tablet (100 mg total) by mouth daily 30 tablet 0    warfarin (COUMADIN) 10 mg tablet Take 1 tablet (10 mg total) by mouth daily 30 tablet 5     No current facility-administered medications for this visit.       (Not in a hospital admission)        Objective:     24 Hour Vitals Assessment:     There were no vitals filed for this visit.      PHYSICIAN EXAM:    General: Appearance: alert, cooperative, no distress.  HEENT: Normocephalic, atraumatic. No scleral icterus. conjunctivae clear. EOMI.  Chest: No tenderness to palpation. No open wound noted.  Lungs: Clear to auscultation bilaterally, Respirations unlabored.  Cardiac: Regular rate, +S1and S2  Abdomen: Soft, non-tender, non-distended. Bowel sounds are normal.   Extremities:  No edema, cyanosis, clubbing.  Skin: Skin color, turgor are normal. No rashes.  Lymphatics: no palpable supra-cervical, axillary, or inguinal adenopathy  Neurologic: Awake, Alert, and oriented, no gross focal deficits noted b/l.       DATA REVIEW:    Pathology Result:    Final Diagnosis   Date Value Ref Range Status   07/19/2024   Final    A. Kidney, Left:  - As per gross description.        06/12/2024   Final    A -C.  Bone marrow,  left iliac crest,  biopsy, clot, peripheral blood, and aspirate:  -  No definitive morphologic or immunophenotypic evidence of plasma cell neoplasm, see comment.  -  FANCD2 mutation of uncertain significance (VAF: 52.5%), see comment.  -  Normocellular bone marrow (60-70% cellularity) with myeloid predominant trilineage maturing hematopoiesis, mild megakaryocytic hyperplasia, and no increase in blasts.   -  Adequate iron stores.  -  No significance increase in reticulin fibrosis.    Comment: The patient's history of anemia and recently identified monoclonal protein is noted.   The current bone marrow biopsy shows a polyclonal increase in plasma cells with no evidence of light chain restriction by kappa/lambda-MJ. Additionally flow cytometry is  negative for a clonal plasma cell population. In the setting of a circulating monoclonal protein, the possibility of a small clonal plasma cell population arising in a background of polyclonal plasma cells cannot be entirely excluded. Therefore, continued follow-up and monitoring of the peripheral blood by serum protein electrophoresis with heavy and light analysis is advised.    Evaluation of the myeloid lineage is within normal limits at this time. Flow cytometry is negative for myeloid abnormalities. Karyotype analysis identifies a normal male complement and Next generation sequencing is negative for clinically significant mutations.    Finally, Next generation sequencing identifies a FANCD2 mutation of unclear significance (VAF:52.5%). FANC mutations if germline are known to be associated with cancer predisposition syndrome. Therefore, genetic counseling consultation is advised to exclude the possibility of a germline mutation. Clinical correlation is advised.    Dr. Fernández is notified of the findings by Dr. Fabian via [employer] Secure Chat on 6/27/24.     05/16/2024   Final    A. Skin, Cyst/Tag/Debridement, Sebaceous cyst right side of scalp, excision:  - Epidermal (infundibular) cyst.          07/06/2021   Final    A. Skin, Debridement, left groin:  - Portion of skin with abscess surrounded by acute and chronically inflamed granulation tissue and     scar. Correlate with definitive culture results, if available.    -- Scattered foreign body giant cells; no polarizable material.  - Negative for malignancy.         04/06/2021   Final    A. Skin, Left Thigh (Mass), Excision:  - Skin with underlying abscess, reactive changes, and sinus tract formation.            Image Results:   Image result are reviewed and documented in Hematology/Oncology history. I personally reviewed these images.    IR biopsy kidney random  Narrative: CT-scan guided core biopsy of the left kidney    History: CKD now with CLOVIS requiring  dialysis.    Conscious sedation time: na    Technique: This examination, like all CT scans performed in the [facility], was performed utilizing techniques to minimize radiation dose exposure, including the use of iterative reconstruction and automated exposure control.    The patient was brought to the CT scanner and placed prone on the table. After axial images were obtained through the abdomen, an area of the skin was then marked, prepped, and draped in usual sterile fashion. Lidocaine was administered to the skin and a   small skin incision was made. A 17-gauge cannula was advanced up to the cortex of the left kidney and an 18 gauge core biopsy specimen was obtained. 4 additional passes were made.    The specimen was found to be adequate for [facility] kit evaluation.    At the end of the procedure, D-Stat was used for hemostasis.    The patient tolerated the procedure well and suffered no complications.  Impression: Impression: Successful percutaneous 18-gauge core biopsy of the left kidney yielding a specimen adequate for Eminence kit evaluation. A full pathology report will follow.    Workstation performed: [id number]      LABS:  Lab data are reviewed and documented in [facility] history.       Lab Results   Component Value Date    HGB 9.2 (L) 07/22/2024    HCT 29.2 (L) 07/22/2024    MCV 94 07/22/2024     07/22/2024    WBC 5.69 07/22/2024    NRBC 0 07/12/2024    BANDSPCT 1 06/19/2024    ATYLMPCT 2 (H) 01/30/2024     Lab Results   Component Value Date     12/29/2016    K 4.1 08/03/2024     08/03/2024    CO2 27 08/03/2024    BUN 37 (H) 08/03/2024    CREATININE 6.56 (H) 08/03/2024    GLUCOSE 135 05/26/2021    GLUF 157 (H) 01/26/2024    CALCIUM 9.3 07/24/2024    CORRECTEDCA 9.7 07/04/2024    AST 12 08/03/2024    ALT 18 08/03/2024    ALKPHOS 64 07/24/2024    PROT 6.9 12/29/2016    BILITOT 0.3 12/29/2016    EGFR 10 (L) 08/03/2024       Lab Results   Component Value Date    IRON 38 (L)  "06/04/2024    TIBC 226 (L) 06/04/2024    FERRITIN 69 06/04/2024    FERRITIN 38 04/01/2022       Lab Results   Component Value Date    [id number] 310 06/04/2024       No results for input(s): \"WBC\", \"CREAT\", \"PLT\" in the last 72 hours.       By:  Brodie Snyder MD, 8/26/2024, 10:43 AM                                  "

## 2024-08-21 ENCOUNTER — TELEPHONE (OUTPATIENT)
Dept: CARDIOLOGY CLINIC | Facility: CLINIC | Age: 44
End: 2024-08-21

## 2024-08-21 ENCOUNTER — PATIENT OUTREACH (OUTPATIENT)
Dept: CASE MANAGEMENT | Facility: HOSPITAL | Age: 44
End: 2024-08-21

## 2024-08-21 DIAGNOSIS — N19 RENAL FAILURE: ICD-10-CM

## 2024-08-21 DIAGNOSIS — Z99.2 ESRD (END STAGE RENAL DISEASE) ON DIALYSIS (HCC): ICD-10-CM

## 2024-08-21 DIAGNOSIS — N18.6 ESRD (END STAGE RENAL DISEASE) ON DIALYSIS (HCC): ICD-10-CM

## 2024-08-21 RX ORDER — SEVELAMER HYDROCHLORIDE 800 MG/1
800 TABLET, FILM COATED ORAL
Qty: 90 TABLET | Refills: 5 | Status: SHIPPED | OUTPATIENT
Start: 2024-08-21

## 2024-08-21 RX ORDER — ASCORBIC ACID, THIAMINE MONONITRATE,RIBOFLAVIN, NIACINAMIDE, PYRIDOXINE HYDROCHLORIDE, FOLIC ACID, CYANOCOBALAMIN, BIOTIN, CALCIUM PANTOTHENATE, 100; 1.5; 1.7; 20; 10; 1; 6000; 150000; 5 MG/1; MG/1; MG/1; MG/1; MG/1; MG/1; UG/1; UG/1; MG/1
1 CAPSULE, LIQUID FILLED ORAL
Qty: 30 CAPSULE | Refills: 8 | Status: SHIPPED | OUTPATIENT
Start: 2024-08-21 | End: 2025-05-18

## 2024-08-21 NOTE — PROGRESS NOTES
Chart reviewed and this RNCM called patient for follow up. Patient says he is doing well. He says his breathing is good. He denies any CP, chest tightness or SOB. No edema any more then normal in his leg with Lymphedema. Patient says that his PCP sent over prescription on his Vit D, Iron and Coumadin and his Cardiologist sent over prescriptions on Carvedilol, Isosorbide, Nifedipine and Torsemide. He states he picked them all up but Cardiology did not give him any refills on the meds they prescribed. I told him I would message Cardiology office. He states the other 2 medications he needs-Renagel and B12 will be ordered by Nephrology.. He states he is [facility] now. Patient couldn't talk but is agreeable to follow up.    RNCM to follow.

## 2024-08-21 NOTE — TELEPHONE ENCOUNTER
----- Message from Yoli RMAOS sent at 8/21/2024  3:03 PM EDT -----  Regarding: Refills  Hi! I just spoke with Vineet! He did get the medications you sent to the pharmacy Carvedilol, Isosorbide. Nifedipine and Torsemide but you didn't provide any refills and his next apt with you is not until November. Was this a mistake or do you want him to be seen sooner? Please advise. Thank you! Yoli   Face Mask

## 2024-08-26 ENCOUNTER — ANTICOAG VISIT (OUTPATIENT)
Dept: FAMILY MEDICINE CLINIC | Facility: CLINIC | Age: 44
End: 2024-08-26

## 2024-08-26 ENCOUNTER — TELEPHONE (OUTPATIENT)
Dept: HEMATOLOGY ONCOLOGY | Facility: CLINIC | Age: 44
End: 2024-08-26

## 2024-08-26 ENCOUNTER — TELEMEDICINE (OUTPATIENT)
Dept: HEMATOLOGY ONCOLOGY | Facility: CLINIC | Age: 44
End: 2024-08-26
Payer: COMMERCIAL

## 2024-08-26 DIAGNOSIS — D47.2 MGUS (MONOCLONAL GAMMOPATHY OF UNKNOWN SIGNIFICANCE): Primary | ICD-10-CM

## 2024-08-26 PROCEDURE — 99214 OFFICE O/P EST MOD 30 MIN: CPT | Performed by: INTERNAL MEDICINE

## 2024-08-26 NOTE — TELEPHONE ENCOUNTER
Spoke with patient and scheduled his next virtual appointment with dr laguna on 12/30/2024 @ 1:45 patient agreed.

## 2024-08-28 ENCOUNTER — HOSPITAL ENCOUNTER (OUTPATIENT)
Dept: RADIOLOGY | Facility: HOSPITAL | Age: 44
Discharge: HOME | End: 2024-08-28
Attending: INTERNAL MEDICINE
Payer: COMMERCIAL

## 2024-08-28 ENCOUNTER — HOSPITAL ENCOUNTER (OUTPATIENT)
Dept: RADIOLOGY | Facility: HOSPITAL | Age: 44
Discharge: HOME | End: 2024-08-28
Payer: COMMERCIAL

## 2024-08-28 DIAGNOSIS — Z99.2 ESRD (END STAGE RENAL DISEASE) ON DIALYSIS (HCC): ICD-10-CM

## 2024-08-28 DIAGNOSIS — N18.6 ESRD (END STAGE RENAL DISEASE) ON DIALYSIS (HCC): ICD-10-CM

## 2024-08-28 PROCEDURE — 93985 DUP-SCAN HEMO COMPL BI STD: CPT

## 2024-08-28 PROCEDURE — 93985 DUP-SCAN HEMO COMPL BI STD: CPT | Performed by: SURGERY

## 2024-08-28 PROCEDURE — 76770 US EXAM ABDO BACK WALL COMP: CPT

## 2024-08-28 NOTE — PROGRESS NOTES
Progress Note - Cardiology Office  [facility]    Joaquin Frankel 44 y.o. male MRN: [id number]  : 1980  Encounter: [id number]      ASSESSMENT:  Pulmonary embolism, subsegmental right lower lobe  CTA chest 2024  Subsegmental right lower lobe pulmonary embolism.  No CT evidence of right heart strain.  Anticoagulated with warfarin     ESRD on HD     Coronary artery disease,   S/p NSTEMI 2020,   s/p PCI of RCA on 2020,  History of PCI of LCX in      TTE:  EF 58%, moderate LVH, G1 DD,  Moderate biatrial enlargement, mild tricuspid regurgitation, RSVP 58 mmHg     Hypertension  BP is controlled with meds and dialysis     Hyperlipidemia:  2024 , , HDL 28  On atorvastatin 80 mg     Type 2 diabetes mellitus:     History of Left groin cellulitis/mass:  S/p excision of left groin mass with skin grafting     Chronic Tobacco Abuse  Quit Smoking about 3 years ago     Morbid obesity: BMI 56.07     Chronic LLE lymphedema     Anemia of chronic disease  Monoclonal gammopathy  S/p bone marrow biopsy           RECOMMENDATIONS:   Continue cardiac medications including aspirin, atorvastatin, Coreg, Imdur, and nifedipine.  Diuretic is being managed by nephrology  Warfarin is being managed by PCP      Please call [phone] if any questions.    HPI :     Joaquin Frankel is a 44 y.o. year old male who came for follow up.  He has end-stage renal disease on dialysis and recently diagnosed PE currently being treated with warfarin.  He also has a history of CAD s/p PCI of his RCA and left circumflex.  He states that he is feeling well and denies any specific symptoms.  He is planning to go on a renal transplant list    REVIEW OF SYSTEMS:  Denies any new or acute cardiac symptoms.  Denies chest pain, unusual dyspnea, palpitations or syncope      Historical Information   Past Medical History:   Diagnosis Date    Acute hypoxic respiratory failure (HCC) 10/07/2023     "Arthritis     Breathing difficulty     Cellulitis 10/07/2023    Coronary artery disease     Diabetes mellitus (HCC)     Diabetic neuropathy ([facility]) 05/28/2021    Heart disease     CAD   s/p ptca with 1 stent 2012    Hidradenitis suppurativa     groin    History of transfusion     Hyperlipidemia     Hypertension     MI (myocardial infarction) ([facility])     \" silent \" M.I. in the past    Morbid obesity with BMI of 50.0-59.9, adult ([facility])     Nicotine dependence     Obesity     Pilonidal cyst     Type 1 non-ST elevation myocardial infarction (NSTEMI) ([facility]) 11/29/2020     Past Surgical History:   Procedure Laterality Date    CARDIAC SURGERY      CORONARY ANGIOPLASTY WITH STENT PLACEMENT      INCISION AND DRAINAGE OF WOUND N/A 1/24/2017    Procedure: INCISION AND DRAINAGE (I&D) BUTTOCK, PILONIDAL CYST;  Surgeon: Simba Adhikari MD;  Location: [facility];  Service:     IR BIOPSY BONE MARROW  6/12/2024    IR BIOPSY KIDNEY RANDOM  7/12/2024    IR BIOPSY KIDNEY RANDOM  7/19/2024    IR TEMPORARY DIALYSIS CATHETER PLACEMENT  6/6/2024    IR TUNNELED CENTRAL LINE PLACEMENT  6/14/2024    LEG SURGERY Left     I&D of left leg 2012    MN DEBRIDEMENT SUBCUTANEOUS TISSUE 1ST 20 SQ CM/< Left 7/6/2021    Procedure: DEBRIDEMENT WOUND (WASH OUT);  Surgeon: Sudheer Mcfarlane MD;  Location: [facility];  Service: General    MN EXC B9 LESION MRGN XCP SK TG T/A/L >4.0 CM Left 4/6/2021    Procedure: EXCISION THIGH MASS;  Surgeon: Sudheer Mcfarlane MD;  [facility];  Service: General    MN EXCISION H/P/P/U COMPLEX REPAIR Left 7/6/2021    Procedure: EXCISION PERINEAL ABSCESS LEFT THIGH;  Surgeon: Sudheer Mcfarlane MD;  [facility];  Service: General    MN NEGATIVE PRESSURE WOUND THERAPY DME <= 50 SQ CM Left 4/6/2021    Procedure: APPLICATION VAC DRESSING THIGH;  Surgeon: Sudheer Mcfarlane MD;  [facility];  Service: General    WOUND DEBRIDEMENT Left 4/17/2021    Procedure: DEBRIDEMENT WOUND AND DRESSING CHANGE (WASH OUT);  Surgeon: Mahin Traore DO;  " Location: [facility];  Service: General    WOUND DEBRIDEMENT Left 4/22/2021    Procedure: DEBRIDEMENT LOWER EXTREMITY (WASH OUT), left groin and thigh;  Surgeon: Sudheer Mcfarlane MD;  [facility];  Service: General    WOUND DEBRIDEMENT Left 5/26/2021    Procedure: DEBRIDEMENT LOWER EXTREMITY (WASH OUT);  Surgeon: Zak Castanon DO;  [facility];  Service: General    WOUND DEBRIDEMENT Left 5/28/2021    Procedure: Washout left thigh wound and closure;  Surgeon: Amor Jaramillo DO;  [facility];  Service: General     Social History     Substance and Sexual Activity   Alcohol Use Not Currently    Comment: rarely     Social History     Substance and Sexual Activity   Drug Use No     Social History     Tobacco Use   Smoking Status Former    Current packs/day: 0.00    Average packs/day: 1.5 packs/day for 20.0 years (29.9 ttl pk-yrs)    Types: Cigarettes    Start date: 01/2021    Quit date: 03/2021    Years since quitting: 3.4    Passive exposure: Past   Smokeless Tobacco Never     Family History:   Family History   Problem Relation Age of Onset    Heart disease Father     Diabetes Father     Hypertension Mother     Heart disease Mother        Meds/Allergies     Allergies   Allergen Reactions    Keflex [Cephalexin] Facial Swelling and Lip Swelling    Amoxicillin Hives     childhood       Current Outpatient Medications:     aspirin (ECOTRIN LOW STRENGTH) 81 mg EC tablet, Take 1 tablet (81 mg total) by mouth daily, Disp: 90 tablet, Rfl: 2    atorvastatin (LIPITOR) 80 mg tablet, Take 1 tablet (80 mg total) by mouth daily, Disp: 90 tablet, Rfl: 3    b complex-vitamin C-folic acid (RENAL) 1 mg, Take 1 capsule by mouth daily with dinner, Disp: 30 capsule, Rfl: 8    carvedilol (COREG) 6.25 mg tablet, Take 1 tablet (6.25 mg total) by mouth 2 (two) times a day with meals, Disp: 120 tablet, Rfl: 2    Cholecalciferol (VITAMIN D3) 1,000 units tablet, Take 2 tablets (2,000 Units total) by mouth daily, Disp:  "60 tablet, Rfl: 5    ferrous sulfate 324 (65 Fe) mg, Take 1 tablet (324 mg total) by mouth 2 (two) times a day before meals, Disp: 60 tablet, Rfl: 5    isosorbide mononitrate (IMDUR) 60 mg 24 hr tablet, Take 1 tablet (60 mg total) by mouth daily, Disp: 90 tablet, Rfl: 2    NIFEdipine (PROCARDIA XL) 30 mg 24 hr tablet, Take 1 tablet (30 mg total) by mouth daily, Disp: 90 tablet, Rfl: 2    sevelamer (RENAGEL) 800 mg tablet, Take 1 tablet (800 mg total) by mouth 3 (three) times a day with meals, Disp: 90 tablet, Rfl: 5    torsemide (DEMADEX) 100 mg tablet, Take 1 tablet (100 mg total) by mouth daily, Disp: 30 tablet, Rfl: 0    warfarin (COUMADIN) 10 mg tablet, Take 1 tablet (10 mg total) by mouth daily, Disp: 30 tablet, Rfl: 5    glimepiride (AMARYL) 2 mg tablet, Take 1 tablet (2 mg total) by mouth daily with breakfast (Patient not taking: Reported on 8/13/2024), Disp: 30 tablet, Rfl: 3    Vitals: Blood pressure 122/78, pulse 71, height 5' 7\" (1.702 m), weight (!) 162 kg (358 lb), SpO2 96%.    Body mass index is 56.07 kg/m².  Vitals:    08/29/24 1328   Weight: (!) 162 kg (358 lb)     BP Readings from Last 3 Encounters:   08/29/24 122/78   08/07/24 134/78   08/02/24 132/72       Physical Exam:  Physical Exam    Neurologic:  Alert & oriented x 3, no new focal deficits, Not in any acute distress,  Constitutional: Morbidly obese  Eyes:  Pupil equal and reacting to light, conjunctiva normal,   HENT:  Atraumatic, oropharynx moist, Neck- normal range of motion, no tenderness,  Neck supple, No JVP, No LNP   Respiratory:  Bilateral air entry, mostly clear to auscultation  Cardiovascular: S1-S2 regular with a I/VI systolic murmur   GI:  Soft, nondistended, normal bowel sounds, nontender, no hepatosplenomegaly appreciated.  Musculoskeletal:  No tenderness, no deformities.   Skin:  Well hydrated, no rash           Diagnostic Studies Review Cardio:      Cardiac testing:   Results for orders placed during the hospital encounter of " 21    Echo complete with contrast if indicated    Narrative  [place] [id number] [phone]    Transthoracic Echocardiogram  2D, M-mode, Doppler, and Color Doppler    Study date:  2021    Patient: MORELIA MASSEY  MR number: [id number]  Account number: [id number]  : 1980  Age: 40 years  Gender: Male  Status: Inpatient  Location: Bedside  Height: 66 in  Weight: 369.2 lb  BP: 100/ 58 mmHg    Indications: Assess left ventricular function.    Diagnoses: J96.91 - Respiratory failure, unspecified with hypoxia    Sonographer:  SIMEON Barnes  Primary Physician:  Heydi Norman MD  Referring Physician:  Lauryn Ullrich,DO  Group:  [facility]  Cardiology Fellow:  Simba Rm MD  Interpreting Physician:  Irving Arnett MD    SUMMARY    PROCEDURE INFORMATION:  This was a technically difficult study.  Intravenous contrast ( 0.6 ml Definity/NSS) was administered.    LEFT VENTRICLE:  Systolic function was normal. Ejection fraction was estimated to be 65 %.  There were no regional wall motion abnormalities.    RIGHT VENTRICLE:  The size was normal.  Systolic function was normal.    HISTORY: PRIOR HISTORY: Respiratory failure with hypoxia, CAD s/p stent, Hyperlipidemia    PROCEDURE: The procedure was performed at the bedside. This was a routine study. The transthoracic approach was used. The study included complete 2D imaging, M-mode, complete spectral Doppler, and color Doppler. The heart rate was 66 bpm,  at the start of the study. Images were obtained from the parasternal, apical, subcostal, and suprasternal notch acoustic windows. Intravenous contrast ( 0.6 ml Definity/NSS) was administered. Echocardiographic views were limited due to  decreased penetration and patient on mechanical ventilator. This was a technically difficult study.    LEFT VENTRICLE: Size was normal. Systolic function was normal.  Ejection fraction was estimated to be 65 %. There were no regional wall motion abnormalities. Wall thickness was normal. There was no evidence of concentric hypertrophy.  DOPPLER: The transmitral flow pattern was normal. The study was not technically sufficient to allow evaluation of LV diastolic function.    RIGHT VENTRICLE: The size was normal. Systolic function was normal.    LEFT ATRIUM: Size was normal.    RIGHT ATRIUM: Size was normal.    MITRAL VALVE: Valve structure was normal. There was normal leaflet separation. DOPPLER: The transmitral velocity was within the normal range. There was no evidence for stenosis. There was no significant regurgitation.    AORTIC VALVE: The valve was probably trileaflet. Leaflets exhibited normal thickness and normal cuspal separation. DOPPLER: Transaortic velocity was within the normal range. There was no evidence for stenosis. There was no regurgitation.    TRICUSPID VALVE: The valve structure was normal. There was normal leaflet separation. DOPPLER: The transtricuspid velocity was within the normal range. There was no evidence for stenosis. There was trace regurgitation. The tricuspid jet  envelope definition was inadequate for estimation of RV systolic pressure.    PULMONIC VALVE: Leaflets exhibited normal thickness, no calcification, and normal cuspal separation. DOPPLER: The transpulmonic velocity was within the normal range. There was no evidence for stenosis. There was no significant  regurgitation.    PERICARDIUM: There was no pericardial effusion.    AORTA: The root exhibited normal size.    SYSTEMIC VEINS: IVC: The inferior vena cava was dilated. Respirophasic changes in dimension were absent.    SYSTEM MEASUREMENT TABLES    2D  %FS: 40.76 %  Ao Diam: 3.12 cm  EDV(Teich): 125.68 ml  EF(Teich): 71.22 %  ESV(Teich): 36.17 ml  IVSd: 0.96 cm  LA Area: 18.82 cm2  LA Diam: 4.09 cm  LVEDV MOD A4C: 139.97 ml  LVEF MOD A4C: 73.53 %  LVESV MOD A4C: 37.05 ml  LVIDd: 5.13  cm  LVIDs: 3.04 cm  LVLd A4C: 8.89 cm  LVLs A4C: 6.99 cm  LVPWd: 0.97 cm  RA Area: 19.01 cm2  RVIDd: 3.58 cm  SV MOD A4C: 102.92 ml  SV(Teich): 89.51 ml    MM  TAPSE: 2.34 cm    PW  E' Sept: 0.07 m/s  E/E' Sept: 7.73  MV A Flavio: 0.38 m/s  MV Dec Oliver: 2.89 m/s2  MV DecT: 197.93 ms  MV E Flavio: 0.57 m/s  MV E/A Ratio: 1.5  MV PHT: 57.4 ms  MVA By PHT: 3.83 cm2    [facility] Accredited Echocardiography Laboratory    Prepared and electronically signed by    Irving Arnett MD  Signed 2021 10:12:03      Results for orders placed during the hospital encounter of 24    Echo complete w/ contrast if indicated    Interpretation Summary    Left Ventricle: Left ventricular cavity size is normal. Wall thickness is moderately increased. There is moderate concentric hypertrophy. The left ventricular ejection fraction is 58% by visual estimation. Systolic function is normal. Wall motion is normal. Diastolic function is mildly abnormal, consistent with grade I (abnormal) relaxation.  Left atrial filling pressure is normal.    Left Atrium: The atrium is mildly dilated.    Right Atrium: The atrium is mildly dilated.    Mitral Valve: There is mild regurgitation.    Tricuspid Valve: There is mild regurgitation. The right ventricular systolic pressure is moderately elevated. The estimated right ventricular systolic pressure is 58.00 mmHg.    Pulmonic Valve: There is mild regurgitation.    Compared to previous exam, there is no significant change.    Results for orders placed during the hospital encounter of 20    Cardiac catheterization    [address] [address] [phone]    ([place])    Invasive Cardiovascular Lab Complete Report    Patient: MORELIA MASSEY  MR number: [id number]  Account number: [id number]  Study date: 2020  Gender: Male  : 1980  Height: 68.9 in  Weight: 374 lb  BSA: 2.69 mï¾²    Allergies:  AMOXICILLIN    Diagnostic Cardiologist:  Jhon Mejias DO  Interventional Cardiologist:  Jhon Mejias DO    SUMMARY    CORONARY CIRCULATION:  There was 1-vessel coronary artery disease.  Mid RCA: There was a 80 % stenosis.    1ST LESION INTERVENTIONS:  A balloon angioplasty with stent procedure was performed on the 80 % lesion in the mid RCA. Following intervention there was a 0 % residual stenosis.  A Resolute Joshua Rx 3.0 x 12mm drug-eluting stent was placed across the lesion and deployed at a maximum inflation pressure of 14 sarah.    INDICATIONS:  --  Possible CAD: unstable angina.    PROCEDURES PERFORMED    --  Left coronary angiography.  --  Right coronary angiography.  --  Inpatient.  --  Mod Sedation Same Physician Initial 15min.  --  Mod Sedation Same Physician Add 15min.  --  Coronary Catheterization (w/o C).  --  Mod Sedation Same Physician Add 15min.  --  Coronary Drug Eluting Stent w/PTCA.  --  Intervention on mid RCA: balloon angioplasty, stent.    PROCEDURE: The risks and alternatives of the procedures and conscious sedation were explained to the patient and informed consent was obtained. The patient was brought to the cath lab and placed on the table. The planned puncture sites  were prepped and draped in the usual sterile fashion.    --  Right radial artery access. After performing an Ravi's test to verify adequate ulnar artery supply to the hand, the radial site was prepped. The puncture site was infiltrated with local anesthetic. The vessel was accessed using the  modified Seldinger technique, a wire was advanced into the vessel, and a sheath was advanced over the wire into the vessel.    --  Left coronary artery angiography. A catheter was advanced over a guidewire into the aorta and positioned in the left coronary artery ostium under fluoroscopic guidance. Angiography was performed.    --  Right coronary artery angiography. A catheter was advanced over a guidewire into the aorta  and positioned in the right coronary artery ostium under fluoroscopic guidance. Angiography was performed.    --  Inpatient.    --  Mod Sedation Same Physician Initial 15min.    --  Mod Sedation Same Physician Add 15min.    --  Coronary Catheterization (w/o [facility]).    --  Mod Sedation Same Physician Add 15min.    LESION INTERVENTION: A balloon angioplasty with stent procedure was performed on the 80 % lesion in the mid RCA. Following intervention there was a 0 % residual stenosis. There was HERNAN 3 flow before the procedure and HERNAN 3 flow after the  procedure.    --  Vessel setup was performed. A 6Fr. Launcher AR1 guiding catheter was used to cannulate the vessel.    --  Vessel setup was performed. A Runthrough NS 180cm wire was used to cross the lesion.    --  Balloon angioplasty was performed, using a Trek Rx 2.5 x 12mm balloon, with 2 inflations and a maximum inflation pressure of 12 sarah.    --  A Resolute [place] Rx 3.0 x 12mm drug-eluting stent was placed across the lesion and deployed at a maximum inflation pressure of 14 sarah.    INTERVENTIONS:  --  Coronary Drug Eluting Stent w/PTCA.    PROCEDURE COMPLETION: The patient tolerated the procedure well and was discharged from the cath lab. TIMING: Test started at 11:08. Test concluded at 11:49. HEMOSTASIS: The sheath was removed. The site was compressed with a Hemoband  device. Hemostasis was obtained. MEDICATIONS GIVEN: Versed (2mg/2ml), 2 mg, IV, at 11:02. Fentanyl (1OOmcg/2 ml), 50 mcg, IV, at 11:02. 1% Lidocaine, 1 ml, subcutaneously, at 11:09. Nitroglycerin (200mcg/ml), 200 mcg, at 11:13. Verapamil  (5mg/2ml), 2.5 mg, IV, at 11:14. Heparin 1000 units/ml, 4,000 units, IV, at 11:14. Fentanyl (1OOmcg/2 ml), 50 mcg, IV, at 11:14. Versed (2mg/2ml), 1 mg, IV, at 11:14. Ticagrelor, 180 mg, PO, at 11:28. Heparin 1000 units/ml, 8,000 units,  IV, at 11:29. Versed (2mg/2ml), 1 mg, IV, at 11:39. Fentanyl (1OOmcg/2 ml), 25 mcg, IV, at 11:39. Fentanyl (1OOmcg/2 ml), 25 mcg,  IV, at 11:41. Heparin 1000 units/ml, 2,000 units, IV, at 11:49. CONTRAST GIVEN: 130 ml Omnipaque (350 mg I  /ml). RADIATION EXPOSURE: Fluoroscopy time: 9.95 min.    CORONARY VESSELS:   --  The coronary circulation is right dominant.  --  There was 1-vessel coronary artery disease.  --  Left main: Angiography showed minor luminal irregularities.  --  LAD: Angiography showed minor luminal irregularities.  --  Mid circumflex: There was a 30 % stenosis.  --  RCA: The vessel was excessively tortuous.  --  Proximal RCA: There was a 30 % stenosis.  --  Mid RCA: There was a 80 % stenosis.  --  Distal RCA: There was a 40 % stenosis.    IMPRESSIONS:  1V CAD/RCA with SABINE placement mid RCA    RECOMMENDATIONS  weight loss, smoking cessation, DM control, dapt x 1 year, gdmt cad    DISPOSITION:  The patient left the catheterization laboratory in stable condition.    Prepared and signed by    Jhon Mejias DO  Signed 2020 11:54:00    Study diagram    Angiographic findings  Native coronary lesions:  ï¾·Mid circumflex: Lesion 1: 30 % stenosis.  ï¾·Proximal RCA: Lesion 1: 30 % stenosis.  ï¾·Mid RCA: Lesion 1: 80 % stenosis.  ï¾·Distal RCA: Lesion 1: 40 % stenosis.  Intervention results  Native coronary lesions:  ï¾·balloon angioplasty and stent of the 80 % stenosis in mid RCA. 0 % residual stenosis. Stent: Resolute Joshua Rx 3.0 x 12mm drug-eluting.    Hemodynamic tables    Pressures:  Baseline  Pressures:  - HR: 77  Pressures:  - Rhythm:  Pressures:  -- Aortic Pressure (S/D/M): 160/100/126    Outputs:  Baseline  Outputs:  -- CALCULATIONS[age]  Outputs:  -- CALCULATIONS: Body Surface Area: 2.69  Outputs:  -- CALCULATIONS: Height in cm: 175.00  Outputs:  -- CALCULATIONS: Sex: Male  Outputs:  -- CALCULATIONS: Weight in k.00    Results for orders placed during the hospital encounter of 24    NM myocardial perfusion spect (rx stress and/or rest)    Interpretation Summary    Stress ECG: The stress  "ECG is equivocal for ischemia after pharmacologic vasodilation.    Perfusion: There is a left ventricular perfusion defect that is small to medium in size present in the apical location(s) that is predominantly fixed.    Stress Combined Conclusion: Left ventricular perfusion is probably abnormal.    Stress Function: Left ventricular function post-stress is normal. Stress ejection fraction is 58%.    Perfusion Defect Conclusion: The stress/rest perfusion ratio is 1.10. There is no evidence of transient ischemic dilation (TID).    Abnormal study. Small predominantly fixed apical defect. SSS 3 SRS 1 SDS 2. No major reversible perfusion defect noted on this study. Elevated BP noted during the they study.        Imaging:  Chest X-Ray:   No Chest XR results available for this patient.    CT-scan of the chest:     CTA chest pe study    Result Date: 2/17/2024  Impression No acute pulmonary emboli although the pulmonary arteries are suboptimally opacified. Mild diffuse bronchial wall thickening compatible with bronchitis, possibly related to RSV. Mild coronary artery calcification, greater than expected for the patient's age, with coronary stent. Workstation performed: [id number]     Lab Review   Lab Results   Component Value Date    WBC 5.69 07/22/2024    HGB 9.2 (L) 07/22/2024    HCT 29.2 (L) 07/22/2024    MCV 94 07/22/2024    RDW 13.6 07/22/2024     07/22/2024     BMP:  Lab Results   Component Value Date    SODIUM 140 08/03/2024    K 4.1 08/03/2024     08/03/2024    CO2 27 08/03/2024    BUN 37 (H) 08/03/2024    CREATININE 6.56 (H) 08/03/2024    GLUC 108 (H) 08/03/2024    GLUF 157 (H) 01/26/2024    CALCIUM 9.3 07/24/2024    CORRECTEDCA 9.7 07/04/2024    EGFR 10 (L) 08/03/2024    MG 2.0 07/20/2024     LFT:  Lab Results   Component Value Date    AST 12 08/03/2024    ALT 18 08/03/2024    ALKPHOS 64 07/24/2024    TP 7.7 08/03/2024    ALB 3.7 (L) 08/03/2024      No components found for: \"TSH3\"  Lab Results "   Component Value Date    VOZ1RPPACGCM 2.784 03/30/2022     Lab Results   Component Value Date    HGBA1C 5.7 (H) 06/09/2024     Lipid Profile:   Lab Results   Component Value Date    CHOLESTEROL 101 08/03/2024    HDL 25 (L) 08/03/2024    LDLCALC 53 08/03/2024    TRIG 125 08/03/2024     Lab Results   Component Value Date    CHOLESTEROL 101 08/03/2024    CHOLESTEROL 184 04/29/2024     Lab Results   Component Value Date    CKTOTAL 267 06/03/2024    TROPONINI <0.02 05/26/2021     Lab Results   Component Value Date    NTBNP 203 (H) 12/02/2021      Recent Results (from the past 672 hour(s))   Protime-INR    Collection Time: 08/03/24  8:33 AM   Result Value Ref Range    INR 2.1 (H) 0.9 - 1.2    Prothrombin Time 20.9 (H) 9.1 - 12.0 sec   Comprehensive metabolic panel    Collection Time: 08/03/24  8:33 AM   Result Value Ref Range    Glucose, Random 108 (H) 70 - 99 mg/dL    BUN 37 (H) 6 - 24 mg/dL    Creatinine 6.56 (H) 0.76 - 1.27 mg/dL    eGFR 10 (L) >59 mL/min/1.73    SL AMB BUN/CREATININE RATIO 6 (L) 9 - 20    Sodium 140 134 - 144 mmol/L    Potassium 4.1 3.5 - 5.2 mmol/L    Chloride 100 96 - 106 mmol/L    CO2 27 20 - 29 mmol/L    CALCIUM 9.1 8.7 - 10.2 mg/dL    Protein, Total 7.7 6.0 - 8.5 g/dL    Albumin 3.7 (L) 4.1 - 5.1 g/dL    Globulin, Total 4.0 1.5 - 4.5 g/dL    TOTAL BILIRUBIN 0.3 0.0 - 1.2 mg/dL    Alk Phos Isoenzymes 82 44 - 121 IU/L    AST 12 0 - 40 IU/L    ALT 18 0 - 44 IU/L   Lipid Panel with Direct LDL reflex    Collection Time: 08/03/24  8:33 AM   Result Value Ref Range    Cholesterol, Total 101 100 - 199 mg/dL    Triglycerides 125 0 - 149 mg/dL    HDL 25 (L) >39 mg/dL    VLDL Cholesterol Calculated 23 5 - 40 mg/dL    LDL Calculated 53 0 - 99 mg/dL    LDl/HDL Ratio 2.1 0.0 - 3.6 ratio   Cardiovascular Report    Collection Time: 08/03/24  8:33 AM   Result Value Ref Range    Interpretation Note    [facility] Kidney Stone Panel    Collection Time: 08/03/24  8:33 AM   Result Value Ref Range    Interpretation Note  "   Protime-INR    Collection Time: 08/10/24 10:35 AM   Result Value Ref Range    INR 2.5 (H) 0.9 - 1.2    Prothrombin Time 25.4 (H) 9.1 - 12.0 sec   Protime-INR    Collection Time: 08/26/24  1:37 PM   Result Value Ref Range    INR 1.4 (H) 0.9 - 1.2    Prothrombin Time 14.8 (H) 9.1 - 12.0 sec             Dr. Gino Fulton MD, FACC      \"This note has been constructed using a voice recognition system.Therefore there may be syntax, spelling, and/or grammatical errors. Please call if you have any questions. \"  "

## 2024-08-29 ENCOUNTER — OFFICE VISIT (OUTPATIENT)
Dept: CARDIOLOGY CLINIC | Facility: CLINIC | Age: 44
End: 2024-08-29
Payer: COMMERCIAL

## 2024-08-29 VITALS
HEIGHT: 67 IN | SYSTOLIC BLOOD PRESSURE: 122 MMHG | HEART RATE: 71 BPM | OXYGEN SATURATION: 96 % | BODY MASS INDEX: 49.44 KG/M2 | WEIGHT: 315 LBS | DIASTOLIC BLOOD PRESSURE: 78 MMHG

## 2024-08-29 DIAGNOSIS — I25.119 CORONARY ARTERY DISEASE INVOLVING NATIVE CORONARY ARTERY OF NATIVE HEART WITH ANGINA PECTORIS (HCC): ICD-10-CM

## 2024-08-29 DIAGNOSIS — I25.10 CAD (CORONARY ARTERY DISEASE): ICD-10-CM

## 2024-08-29 DIAGNOSIS — E78.5 DYSLIPIDEMIA: ICD-10-CM

## 2024-08-29 DIAGNOSIS — I10 PRIMARY HYPERTENSION: ICD-10-CM

## 2024-08-29 LAB
LEFT EYE DIABETIC RETINOPATHY: POSITIVE
RIGHT EYE DIABETIC RETINOPATHY: POSITIVE

## 2024-08-29 PROCEDURE — 2022F DILAT RTA XM EVC RTNOPTHY: CPT | Performed by: SURGERY

## 2024-08-29 PROCEDURE — 99214 OFFICE O/P EST MOD 30 MIN: CPT | Performed by: INTERNAL MEDICINE

## 2024-08-29 RX ORDER — CARVEDILOL 6.25 MG/1
6.25 TABLET ORAL 2 TIMES DAILY WITH MEALS
Qty: 120 TABLET | Refills: 2 | Status: SHIPPED | OUTPATIENT
Start: 2024-08-29 | End: 2025-02-25

## 2024-08-29 RX ORDER — ATORVASTATIN CALCIUM 80 MG/1
80 TABLET, FILM COATED ORAL DAILY
Qty: 90 TABLET | Refills: 3 | Status: SHIPPED | OUTPATIENT
Start: 2024-08-29

## 2024-08-29 RX ORDER — ISOSORBIDE MONONITRATE 60 MG/1
60 TABLET, EXTENDED RELEASE ORAL DAILY
Qty: 90 TABLET | Refills: 2 | Status: SHIPPED | OUTPATIENT
Start: 2024-08-29

## 2024-08-29 RX ORDER — NIFEDIPINE 30 MG/1
30 TABLET, EXTENDED RELEASE ORAL DAILY
Qty: 90 TABLET | Refills: 2 | Status: SHIPPED | OUTPATIENT
Start: 2024-08-29 | End: 2025-05-26

## 2024-09-05 NOTE — H&P (VIEW-ONLY)
Assessment/Plan:      There are no diagnoses linked to this encounter.    ESRD (end stage renal disease) on dialysis (HCC)  Lab Results   Component Value Date    EGFR 10 (L) 08/03/2024    EGFR 6 07/24/2024    EGFR 7 07/23/2024    CREATININE 6.56 (H) 08/03/2024    CREATININE 9.46 (H) 07/24/2024    CREATININE 8.08 (H) 07/23/2024     44-year-old male presents for dialysis access creation.  He was started on dialysis 2 months ago via PermCath his renal failure secondary to diabetic nephropathy.  He is right-hand dominant no prior access procedures or pacemaker centerline sports.  I discussed access creation with him in general including AV fistulas and AV graft he did have vessel mapping done prior to his visit which shows adequate caliber basilic and cephalic in the upper arm after discussing all risks benefits alternative therapies with him in detail he consented to left upper extremity AV fistula creation possible AV graft.  He is able to achieve 4 METS without chest pain or shortness of breath.  Also of note he was diagnosed with a subsegmental PE 2 months ago and he is taking Coumadin he will need to hold this for 5 days prior to the procedure and we will recheck his INR the day of the procedure.  He does not need a bridge          Subjective:     Patient ID: Joaquin Frankel is a 44 y.o. male.    New patient, presents today to discuss HD access. Vein mapping done 8/28/24. MARKO starkey, on dialysis- STEVE @ [facility].     [facility]    Review of Systems   Constitutional: Negative.    HENT: Negative.     Eyes: Negative.    Respiratory:  Positive for apnea and shortness of breath.    Cardiovascular: Negative.    Gastrointestinal: Negative.    Endocrine: Negative.    Genitourinary: Negative.    Musculoskeletal: Negative.    Skin: Negative.    Allergic/Immunologic: Negative.    Neurological: Negative.    Hematological: Negative.    Psychiatric/Behavioral: Negative.         I have reviewed the ROS above and made changes as  needed.      Objective:     Physical Exam      General  Exam: alert, awake, oriented, no distress, consistent with stated age    Integumentary  Exam: warm, dry, no gross lesions, no bruises and normal color    Head and Neck  Exam: supple, no bruits, trachea midline, no JVD, no mass or palpable nodes    Chest and Lung  Exam: chest normal without deformity, bilaterally expansive, clear to auscultation    Cardiovascular  Exam: regular rate, regular rhythm, no murmurs, no rubs or gallops    Adbomen  Exam: soft, non-tender, non-distended, no pulsatile abdominal masses, no abdominal bruit    Peripheral Vascular  Exam: no clubbing of the digits of the upper extremity, no cyanosis, no edema, both feet are warm, radial pulses 2+ bilaterally, skin well perfused, without and no varicosities.    No widened popliteal pulse noted bilaterally    Upper Extremity:  Palpation: Radial pulse- Bilateral 2+    Neurologic  Exam:alert, non-focal, oriented x 3, cranial nerves II-XII grossly intact      Operative Scheduling Information:    Hospital:  [facility]    Physician:  Erika    Surgery: Left arm AVF creation, possible AV graft    Urgency:  Standard    Level:  Level 4: Outpatients to be scheduled for screening procedures and elective surgery that can be delayed for longer than one month without reasonable expectation of detriment to patient.    Case Length:  Normal    Post-op Bed:  Outpatient    OR Table:  Standard    Equipment Needs:  None    Medication Instructions:  Coumadin:  Hold for 5 days prior to procedure    Hydration:  No    Contrast Allergy:  no

## 2024-09-05 NOTE — PROGRESS NOTES
Assessment/Plan:      There are no diagnoses linked to this encounter.    ESRD (end stage renal disease) on dialysis (HCC)  Lab Results   Component Value Date    EGFR 10 (L) 08/03/2024    EGFR 6 07/24/2024    EGFR 7 07/23/2024    CREATININE 6.56 (H) 08/03/2024    CREATININE 9.46 (H) 07/24/2024    CREATININE 8.08 (H) 07/23/2024     44-year-old male presents for dialysis access creation.  He was started on dialysis 2 months ago via PermCath his renal failure secondary to diabetic nephropathy.  He is right-hand dominant no prior access procedures or pacemaker centerline sports.  I discussed access creation with him in general including AV fistulas and AV graft he did have vessel mapping done prior to his visit which shows adequate caliber basilic and cephalic in the upper arm after discussing all risks benefits alternative therapies with him in detail he consented to left upper extremity AV fistula creation possible AV graft.  He is able to achieve 4 METS without chest pain or shortness of breath.  Also of note he was diagnosed with a subsegmental PE 2 months ago and he is taking Coumadin he will need to hold this for 5 days prior to the procedure and we will recheck his INR the day of the procedure.  He does not need a bridge          Subjective:     Patient ID: Joaquin Frankel is a 44 y.o. male.    New patient, presents today to discuss HD access. Vein mapping done 8/28/24. MARKO starkey, on dialysis- STEVE @ [facility].     [facility]    Review of Systems   Constitutional: Negative.    HENT: Negative.     Eyes: Negative.    Respiratory:  Positive for apnea and shortness of breath.    Cardiovascular: Negative.    Gastrointestinal: Negative.    Endocrine: Negative.    Genitourinary: Negative.    Musculoskeletal: Negative.    Skin: Negative.    Allergic/Immunologic: Negative.    Neurological: Negative.    Hematological: Negative.    Psychiatric/Behavioral: Negative.         I have reviewed the ROS above and made changes as  needed.      Objective:     Physical Exam      General  Exam: alert, awake, oriented, no distress, consistent with stated age    Integumentary  Exam: warm, dry, no gross lesions, no bruises and normal color    Head and Neck  Exam: supple, no bruits, trachea midline, no JVD, no mass or palpable nodes    Chest and Lung  Exam: chest normal without deformity, bilaterally expansive, clear to auscultation    Cardiovascular  Exam: regular rate, regular rhythm, no murmurs, no rubs or gallops    Adbomen  Exam: soft, non-tender, non-distended, no pulsatile abdominal masses, no abdominal bruit    Peripheral Vascular  Exam: no clubbing of the digits of the upper extremity, no cyanosis, no edema, both feet are warm, radial pulses 2+ bilaterally, skin well perfused, without and no varicosities.    No widened popliteal pulse noted bilaterally    Upper Extremity:  Palpation: Radial pulse- Bilateral 2+    Neurologic  Exam:alert, non-focal, oriented x 3, cranial nerves II-XII grossly intact      Operative Scheduling Information:    Hospital:  [facility]    Physician:  Erika    Surgery: Left arm AVF creation, possible AV graft    Urgency:  Standard    Level:  Level 4: Outpatients to be scheduled for screening procedures and elective surgery that can be delayed for longer than one month without reasonable expectation of detriment to patient.    Case Length:  Normal    Post-op Bed:  Outpatient    OR Table:  Standard    Equipment Needs:  None    Medication Instructions:  Coumadin:  Hold for 5 days prior to procedure    Hydration:  No    Contrast Allergy:  no

## 2024-09-06 ENCOUNTER — OFFICE VISIT (OUTPATIENT)
Dept: VASCULAR SURGERY | Facility: CLINIC | Age: 44
End: 2024-09-06
Payer: COMMERCIAL

## 2024-09-06 ENCOUNTER — TELEPHONE (OUTPATIENT)
Dept: VASCULAR SURGERY | Facility: CLINIC | Age: 44
End: 2024-09-06

## 2024-09-06 ENCOUNTER — TELEPHONE (OUTPATIENT)
Age: 44
End: 2024-09-06

## 2024-09-06 VITALS
HEIGHT: 67 IN | WEIGHT: 315 LBS | DIASTOLIC BLOOD PRESSURE: 70 MMHG | HEART RATE: 82 BPM | BODY MASS INDEX: 49.44 KG/M2 | SYSTOLIC BLOOD PRESSURE: 110 MMHG

## 2024-09-06 DIAGNOSIS — N18.6 ESRD (END STAGE RENAL DISEASE) ON DIALYSIS (HCC): Primary | ICD-10-CM

## 2024-09-06 DIAGNOSIS — R31.0 GROSS HEMATURIA: Primary | ICD-10-CM

## 2024-09-06 DIAGNOSIS — Z99.2 ESRD (END STAGE RENAL DISEASE) ON DIALYSIS (HCC): Primary | ICD-10-CM

## 2024-09-06 DIAGNOSIS — I89.0 LYMPHEDEMA: ICD-10-CM

## 2024-09-06 PROCEDURE — 99205 OFFICE O/P NEW HI 60 MIN: CPT | Performed by: SURGERY

## 2024-09-06 RX ORDER — CHLORHEXIDINE GLUCONATE ORAL RINSE 1.2 MG/ML
15 SOLUTION DENTAL ONCE
OUTPATIENT
Start: 2024-09-06 | End: 2024-09-06

## 2024-09-06 NOTE — TELEPHONE ENCOUNTER
Caller:  Joaquin     Doctor: Erika     Reason for call:  Patient is calling to find out if he can still make it to his appt or if this appointment can be pushed back later in the day. In the middle of contacting office pt disconnected  call.     Call back#:  [phone]

## 2024-09-06 NOTE — ASSESSMENT & PLAN NOTE
Lab Results   Component Value Date    EGFR 10 (L) 08/03/2024    EGFR 6 07/24/2024    EGFR 7 07/23/2024    CREATININE 6.56 (H) 08/03/2024    CREATININE 9.46 (H) 07/24/2024    CREATININE 8.08 (H) 07/23/2024     44-year-old male presents for dialysis access creation.  He was started on dialysis 2 months ago via PermCath his renal failure secondary to diabetic nephropathy.  He is right-hand dominant no prior access procedures or pacemaker centerline sports.  I discussed access creation with him in general including AV fistulas and AV graft he did have vessel mapping done prior to his visit which shows adequate caliber basilic and cephalic in the upper arm after discussing all risks benefits alternative therapies with him in detail he consented to left upper extremity AV fistula creation possible AV graft.  He is able to achieve 4 METS without chest pain or shortness of breath.  Also of note he was diagnosed with a subsegmental PE 2 months ago and he is taking Coumadin he will need to hold this for 5 days prior to the procedure and we will recheck his INR the day of the procedure.  He does not need a bridge

## 2024-09-06 NOTE — TELEPHONE ENCOUNTER
Caller: Eliza    Doctor: Erika    Reason for call: Patient is currently at dialysis and his BP dropped and is not feeling well. They are putting him on oxygen and wanted to know if we could give him a later appointment today. I spoke with a Coordinator and she said that they will look into it and call patient back. Informed Eliza of 15min estephania period and that we will reach out to patient with a new appointment.    Call back#: [phone](dialysis center)

## 2024-09-06 NOTE — TELEPHONE ENCOUNTER
REMINDER: Under Reason For Call, comments MUST be formatted as:   (Surgeon's Initials) / (Procedure)      Special Instructions / FYI: No clearance, level 4, [facility], & patient has dialysis M,W,F Zeeshan Gonzáles.    Consent: I certify that patient has signed, printed, timed, and dated their surgery consent.  I certify that the patient's LEGAL NAME and DATE OF BIRTH are written in the upper left corner on BOTH sides of the consent.  I certify that BOTH sides of the completed surgery consent have been scanned into the patient's Epic chart by myself on 9/6/2024.  Yes, I have LABELED the consent in Epic as Consent for Vascular Procedure.     For Surgical Clearances     Levels   1-3   ROUTE this encounter to [facility] Clearance Pool (AND)   [facility] Surgery Coordinator Pool     Level   4   ROUTE this encounter to [facility] Surgery Coordinator Pool       HYDRATION CLEARANCES   ONLY ROUTE TO  [facility] Clearance Pool       Yes, I have ROUTED this encounter to [facility] Surgery Coordinator and/or [facility] Clearance Pool.

## 2024-09-07 PROBLEM — E11.3293 MILD NONPROLIFERATIVE DIABETIC RETINOPATHY OF BOTH EYES WITHOUT MACULAR EDEMA ASSOCIATED WITH TYPE 2 DIABETES MELLITUS (HCC): Status: ACTIVE | Noted: 2024-09-07

## 2024-09-09 ENCOUNTER — PREP FOR PROCEDURE (OUTPATIENT)
Dept: VASCULAR SURGERY | Facility: CLINIC | Age: 44
End: 2024-09-09

## 2024-09-10 ENCOUNTER — ANESTHESIA EVENT (OUTPATIENT)
Dept: PERIOP | Facility: HOSPITAL | Age: 44
End: 2024-09-10

## 2024-09-11 ENCOUNTER — ANTICOAG VISIT (OUTPATIENT)
Dept: FAMILY MEDICINE CLINIC | Facility: CLINIC | Age: 44
End: 2024-09-11

## 2024-09-11 ENCOUNTER — PATIENT OUTREACH (OUTPATIENT)
Dept: CASE MANAGEMENT | Facility: HOSPITAL | Age: 44
End: 2024-09-11

## 2024-09-11 ENCOUNTER — APPOINTMENT (OUTPATIENT)
Dept: LAB | Facility: HOSPITAL | Age: 44
End: 2024-09-11
Payer: COMMERCIAL

## 2024-09-11 DIAGNOSIS — I25.10 CAD (CORONARY ARTERY DISEASE): ICD-10-CM

## 2024-09-11 DIAGNOSIS — N18.2 TYPE 2 DIABETES MELLITUS WITH STAGE 2 CHRONIC KIDNEY DISEASE, WITH LONG-TERM CURRENT USE OF INSULIN (HCC): ICD-10-CM

## 2024-09-11 DIAGNOSIS — Z79.4 INSULIN DEPENDENT TYPE 2 DIABETES MELLITUS (HCC): ICD-10-CM

## 2024-09-11 DIAGNOSIS — E78.49 OTHER HYPERLIPIDEMIA: ICD-10-CM

## 2024-09-11 DIAGNOSIS — L72.0 EPIDERMOID CYST OF SKIN OF SCALP: ICD-10-CM

## 2024-09-11 DIAGNOSIS — I89.0 LYMPHEDEMA: ICD-10-CM

## 2024-09-11 DIAGNOSIS — E11.22 TYPE 2 DIABETES MELLITUS WITH STAGE 2 CHRONIC KIDNEY DISEASE, WITH LONG-TERM CURRENT USE OF INSULIN (HCC): ICD-10-CM

## 2024-09-11 DIAGNOSIS — R31.0 GROSS HEMATURIA: Primary | ICD-10-CM

## 2024-09-11 DIAGNOSIS — N18.2 CKD STAGE 2 DUE TO TYPE 2 DIABETES MELLITUS  (HCC): ICD-10-CM

## 2024-09-11 DIAGNOSIS — I10 ESSENTIAL HYPERTENSION: ICD-10-CM

## 2024-09-11 DIAGNOSIS — Z79.4 TYPE 2 DIABETES MELLITUS WITH STAGE 2 CHRONIC KIDNEY DISEASE, WITH LONG-TERM CURRENT USE OF INSULIN (HCC): ICD-10-CM

## 2024-09-11 DIAGNOSIS — E11.9 INSULIN DEPENDENT TYPE 2 DIABETES MELLITUS (HCC): ICD-10-CM

## 2024-09-11 DIAGNOSIS — E11.22 CKD STAGE 2 DUE TO TYPE 2 DIABETES MELLITUS  (HCC): ICD-10-CM

## 2024-09-11 DIAGNOSIS — E66.01 MORBID OBESITY WITH BODY MASS INDEX (BMI) OF 60.0 TO 69.9 IN ADULT (HCC): ICD-10-CM

## 2024-09-11 DIAGNOSIS — I26.99 ACUTE PULMONARY EMBOLISM, UNSPECIFIED PULMONARY EMBOLISM TYPE, UNSPECIFIED WHETHER ACUTE COR PULMONALE PRESENT (HCC): ICD-10-CM

## 2024-09-11 DIAGNOSIS — L73.2 HIDRADENITIS SUPPURATIVA: ICD-10-CM

## 2024-09-11 DIAGNOSIS — G47.34 NOCTURNAL HYPOXIA: ICD-10-CM

## 2024-09-11 LAB
BACTERIA UR QL AUTO: ABNORMAL /HPF
BILIRUB UR QL STRIP: NEGATIVE
CHOLEST SERPL-MCNC: 122 MG/DL
CLARITY UR: CLEAR
COLOR UR: YELLOW
CREAT UR-MCNC: 30.7 MG/DL
GLUCOSE UR STRIP-MCNC: ABNORMAL MG/DL
HDLC SERPL-MCNC: 25 MG/DL
HGB UR QL STRIP.AUTO: ABNORMAL
INR PPP: 2.65 (ref 0.85–1.19)
KETONES UR STRIP-MCNC: NEGATIVE MG/DL
LDLC SERPL CALC-MCNC: 68 MG/DL (ref 0–100)
LEUKOCYTE ESTERASE UR QL STRIP: ABNORMAL
MICROALBUMIN UR-MCNC: 2419.2 MG/L
MICROALBUMIN/CREAT 24H UR: 7880 MG/G CREATININE (ref 0–30)
NITRITE UR QL STRIP: NEGATIVE
NON-SQ EPI CELLS URNS QL MICRO: ABNORMAL /HPF
PH UR STRIP.AUTO: 7.5 [PH]
PROT UR STRIP-MCNC: ABNORMAL MG/DL
PROTHROMBIN TIME: 28.5 SECONDS (ref 12.3–15)
RBC #/AREA URNS AUTO: ABNORMAL /HPF
SP GR UR STRIP.AUTO: 1.01 (ref 1–1.03)
TRIGL SERPL-MCNC: 147 MG/DL
UROBILINOGEN UR STRIP-ACNC: <2 MG/DL
WBC #/AREA URNS AUTO: ABNORMAL /HPF

## 2024-09-11 PROCEDURE — 36415 COLL VENOUS BLD VENIPUNCTURE: CPT

## 2024-09-11 PROCEDURE — 82043 UR ALBUMIN QUANTITATIVE: CPT

## 2024-09-11 PROCEDURE — 82570 ASSAY OF URINE CREATININE: CPT

## 2024-09-11 PROCEDURE — 81001 URINALYSIS AUTO W/SCOPE: CPT

## 2024-09-11 PROCEDURE — 85610 PROTHROMBIN TIME: CPT

## 2024-09-11 PROCEDURE — 80061 LIPID PANEL: CPT

## 2024-09-11 NOTE — PROGRESS NOTES
Chart reviewed and this RNCM called patient for follow up. Patient says he is feeling well except for his left leg. He says his lymphedema has flared up. He says he wore compression stockings for 2 days and he thinks it made it worse. He says his leg is red and swollen. He denies any fever or chills at this time but says he had some chills a few days ago which went away. He says he is at dialysis right now and the nurse looked at it and will be speaking to the doctor to get a prescription. We discussed the importance of following up with his PCP before going to the ER. He states understanding. He says he will be calling his PCP regarding if he does not get an antibiotic. Patient is scheduled for LUE AVF creation on 8/26 and has an Endocrinology apt on 9/30. Patient says his breathing is good. He denies any CP, chest tightness or SOB. His BS's are fine. No issues. Patient says he has all medications and is taking them as directed. He says he does not need any refills. He is aware to call his doctors for refills before he has less then 1 weeks worth left.     Vineet denies any questions or concerns at this time. He is agreeable to continued CCM services and follow up. Will f/u in 3-4 weeks.      8/29 Cardiology apt: Per notes: Continue cardiac medications including aspirin, atorvastatin, Coreg, Imdur, and nifedipine. Diuretic is being managed by nephrology. Warfarin is being managed by PCP  9/6 Vascular apt: consult for dialysis access creation (LUE AVF), started HD 2 months ago and has PC

## 2024-09-13 DIAGNOSIS — L03.116 CELLULITIS OF LEFT LOWER EXTREMITY: ICD-10-CM

## 2024-09-13 DIAGNOSIS — L03.116 CELLULITIS AND ABSCESS OF LEFT LOWER EXTREMITY: Primary | ICD-10-CM

## 2024-09-13 DIAGNOSIS — L02.416 CELLULITIS AND ABSCESS OF LEFT LOWER EXTREMITY: Primary | ICD-10-CM

## 2024-09-13 RX ORDER — DOXYCYCLINE 100 MG/1
100 TABLET ORAL 2 TIMES DAILY
Qty: 14 TABLET | Refills: 0 | Status: SHIPPED | OUTPATIENT
Start: 2024-09-13 | End: 2024-09-20

## 2024-09-13 NOTE — PROGRESS NOTES
Patient reported that L LE lymphedema is worse and has early signs of infection. He has had cellulitis in the past.     Reports allergy to Keflex since Feb 2024.   Start Doxycycline 100 mg BID x 7 days.

## 2024-09-17 NOTE — PRE-PROCEDURE INSTRUCTIONS
Pre-Surgery Instructions:   Medication Instructions    aspirin (ECOTRIN LOW STRENGTH) 81 mg EC tablet Instructions provided by MD    atorvastatin (LIPITOR) 80 mg tablet Take night before surgery    b complex-vitamin C-folic acid (RENAL) 1 mg Stop taking 7 days prior to surgery.    carvedilol (COREG) 6.25 mg tablet Take day of surgery.    Cholecalciferol (VITAMIN D3) 1,000 units tablet Stop taking 7 days prior to surgery.    doxycycline (ADOXA) 100 MG tablet Hold day of surgery.    isosorbide mononitrate (IMDUR) 60 mg 24 hr tablet Take day of surgery.    NIFEdipine (PROCARDIA XL) 30 mg 24 hr tablet Take night before surgery    sevelamer (RENAGEL) 800 mg tablet Hold day of surgery.    torsemide (DEMADEX) 100 mg tablet Hold day of surgery.    warfarin (COUMADIN) 10 mg tablet Instructions provided by MD   Pt is talking with his PCP re) future sleep study, s/s sleep apnea. LD coumadin 09/20/24 per MD instructions. Medication instructions for day surgery reviewed. Please use only a sip of water to take your instructed medications. Avoid all over the counter vitamins, supplements and NSAIDS for one week prior to surgery per anesthesia guidelines. Tylenol is ok to take as needed.     You will receive a call one business day prior to surgery with an arrival time and hospital directions. If your surgery is scheduled on a Monday, the hospital will be calling you on the Friday prior to your surgery. If you have not heard from anyone by 8pm, please call the hospital supervisor through the hospital  at [phone]. ([facility] [phone] or [place] [phone]).    Do not eat or drink anything after midnight the night before your surgery, including candy, mints, lifesavers, or chewing gum. Do not drink alcohol 24hrs before your surgery. Try not to smoke at least 24hrs before your surgery.       Follow the pre surgery showering instructions as listed in the “My Surgical Experience Booklet” or otherwise provided by your  surgeon's office. Do not use a blade to shave the surgical area 1 week before surgery. It is okay to use a clean electric clippers up to 24 hours before surgery. Do not apply any lotions, creams, including makeup, cologne, deodorant, or perfumes after showering on the day of your surgery. Do not use dry shampoo, hair spray, hair gel, or any type of hair products.     No contact lenses, eye make-up, or artificial eyelashes. Remove nail polish, including gel polish, and any artificial, gel, or acrylic nails if possible. Remove all jewelry including rings and body piercing jewelry.     Wear causal clothing that is easy to take on and off. Consider your type of surgery.    Keep any valuables, jewelry, piercings at home. Please bring any specially ordered equipment (sling, braces) if indicated.    Arrange for a responsible person to drive you to and from the hospital on the day of your surgery. Please confirm the visitor policy for the day of your procedure when you receive your phone call with an arrival time.     Call the surgeon's office with any new illnesses, exposures, or additional questions prior to surgery.    Please reference your “My Surgical Experience Booklet” for additional information to prepare for your upcoming surgery.

## 2024-09-18 DIAGNOSIS — E83.39 HYPERPHOSPHATEMIA: Primary | ICD-10-CM

## 2024-09-18 DIAGNOSIS — I10 PRIMARY HYPERTENSION: ICD-10-CM

## 2024-09-18 RX ORDER — TORSEMIDE 100 MG/1
100 TABLET ORAL DAILY
Qty: 90 TABLET | Refills: 1 | Status: SHIPPED | OUTPATIENT
Start: 2024-09-18 | End: 2025-03-17

## 2024-09-18 RX ORDER — CALCIUM ACETATE 667 MG/1
667 CAPSULE ORAL 3 TIMES DAILY
Qty: 270 CAPSULE | Refills: 1 | Status: SHIPPED | OUTPATIENT
Start: 2024-09-18

## 2024-09-25 PROBLEM — I21.9 MI (MYOCARDIAL INFARCTION) (HCC): Status: ACTIVE | Noted: 2020-11-29

## 2024-09-25 PROBLEM — Z99.2 DIALYSIS PATIENT (HCC): Status: ACTIVE | Noted: 2024-09-25

## 2024-09-25 PROBLEM — Z98.61 HISTORY OF PTCA: Status: ACTIVE | Noted: 2024-09-25

## 2024-09-25 RX ORDER — LIDOCAINE HYDROCHLORIDE 10 MG/ML
0.5 INJECTION, SOLUTION EPIDURAL; INFILTRATION; INTRACAUDAL; PERINEURAL ONCE AS NEEDED
Status: DISCONTINUED | OUTPATIENT
Start: 2024-09-25 | End: 2024-09-25

## 2024-09-25 RX ORDER — SODIUM CHLORIDE, SODIUM LACTATE, POTASSIUM CHLORIDE, CALCIUM CHLORIDE 600; 310; 30; 20 MG/100ML; MG/100ML; MG/100ML; MG/100ML
75 INJECTION, SOLUTION INTRAVENOUS CONTINUOUS
Status: DISCONTINUED | OUTPATIENT
Start: 2024-09-25 | End: 2024-09-25

## 2024-09-25 NOTE — ANESTHESIA PREPROCEDURE EVALUATION
Procedure:  left arm AVF creation, possible AVG (Left: Arm Upper)    Relevant Problems   CARDIO   (+) Coronary artery disease with angina pectoris (HCC)   (+) History of PTCA with stent   (+) MI (myocardial infarction) (HCC)   (+) Other hyperlipidemia   (+) Primary hypertension   (+) Pulmonary embolism (HCC)      ENDO   (+) Type 2 diabetes mellitus with chronic kidney disease on chronic dialysis, without long-term current use of insulin (HCC)      /RENAL   (+) Dialysis patient (HCC)   (+) ESRD (end stage renal disease) on dialysis (HCC)      HEMATOLOGY   (+) Anemia of chronic disease      PULMONARY   (+) ANGY (obstructive sleep apnea)      Eye   (+) Mild nonproliferative diabetic retinopathy of both eyes without macular edema associated with type 2 diabetes mellitus (HCC)      Other   (+) Morbid obesity ([facility])      Last dialyzed yesterday    SABINE to LAD in 2020    Able to climb flight of stairs without cardiopulmonary limitation    Cormack I with Araya 4 on 7/19/24. When queried about difficult intubation hx, patient reports emergent intubation during remote procedure for which sedation had been planned, but does not recall being told intubation itself was difficult    Denies recent fever, cough or other symptom of upper respiratory tract infection.      Physical Exam    Airway    Mallampati score: IV  TM Distance: >3 FB  Neck ROM: full     Dental   No notable dental hx     Cardiovascular      Pulmonary      Other Findings        Anesthesia Plan  ASA Score- 4     Anesthesia Type- general with ASA Monitors.         Additional Monitors:     Airway Plan: ETT.           Plan Factors-Exercise tolerance (METS): >4 METS.    Chart reviewed.        Patient is not a current smoker.              Induction- intravenous.    Postoperative Plan- Plan for postoperative opioid use.         Informed Consent- Anesthetic plan and risks discussed with patient.  I personally reviewed this patient with the CRNA. Discussed and agreed on  the Anesthesia Plan with the CRNA..

## 2024-09-26 ENCOUNTER — ANESTHESIA (OUTPATIENT)
Dept: PERIOP | Facility: HOSPITAL | Age: 44
End: 2024-09-26

## 2024-09-26 ENCOUNTER — HOSPITAL ENCOUNTER (OUTPATIENT)
Facility: HOSPITAL | Age: 44
Setting detail: OUTPATIENT SURGERY
Discharge: HOME | End: 2024-09-26
Attending: SURGERY | Admitting: SURGERY

## 2024-09-26 VITALS
HEART RATE: 71 BPM | SYSTOLIC BLOOD PRESSURE: 143 MMHG | RESPIRATION RATE: 20 BRPM | TEMPERATURE: 97.1 F | DIASTOLIC BLOOD PRESSURE: 71 MMHG | OXYGEN SATURATION: 94 %

## 2024-09-26 DIAGNOSIS — Z99.2 DIALYSIS PATIENT (HCC): Primary | ICD-10-CM

## 2024-09-26 LAB
APTT PPP: 26 SECONDS (ref 23–34)
GLUCOSE SERPL-MCNC: 95 MG/DL (ref 65–140)
INR PPP: 1 (ref 0.85–1.19)
POTASSIUM SERPL-SCNC: 4 MMOL/L (ref 3.5–5.3)
PROTHROMBIN TIME: 13.7 SECONDS (ref 12.3–15)

## 2024-09-26 PROCEDURE — 36821 AV FUSION DIRECT ANY SITE: CPT | Performed by: SURGERY

## 2024-09-26 PROCEDURE — 82948 REAGENT STRIP/BLOOD GLUCOSE: CPT

## 2024-09-26 PROCEDURE — 85730 THROMBOPLASTIN TIME PARTIAL: CPT | Performed by: ANESTHESIOLOGY

## 2024-09-26 PROCEDURE — 85610 PROTHROMBIN TIME: CPT | Performed by: ANESTHESIOLOGY

## 2024-09-26 PROCEDURE — 84132 ASSAY OF SERUM POTASSIUM: CPT | Performed by: ANESTHESIOLOGY

## 2024-09-26 RX ORDER — HYDROCODONE BITARTRATE AND ACETAMINOPHEN 5; 325 MG/1; MG/1
1 TABLET ORAL EVERY 6 HOURS PRN
Qty: 20 TABLET | Refills: 0 | Status: SHIPPED | OUTPATIENT
Start: 2024-09-26 | End: 2024-10-06

## 2024-09-26 RX ORDER — FENTANYL CITRATE 50 UG/ML
INJECTION, SOLUTION INTRAMUSCULAR; INTRAVENOUS AS NEEDED
Status: DISCONTINUED | OUTPATIENT
Start: 2024-09-26 | End: 2024-09-26

## 2024-09-26 RX ORDER — SUCCINYLCHOLINE/SOD CL,ISO/PF 100 MG/5ML
SYRINGE (ML) INTRAVENOUS AS NEEDED
Status: DISCONTINUED | OUTPATIENT
Start: 2024-09-26 | End: 2024-09-26

## 2024-09-26 RX ORDER — MIDAZOLAM HYDROCHLORIDE 2 MG/2ML
INJECTION, SOLUTION INTRAMUSCULAR; INTRAVENOUS AS NEEDED
Status: DISCONTINUED | OUTPATIENT
Start: 2024-09-26 | End: 2024-09-26

## 2024-09-26 RX ORDER — ROCURONIUM BROMIDE 10 MG/ML
INJECTION, SOLUTION INTRAVENOUS AS NEEDED
Status: DISCONTINUED | OUTPATIENT
Start: 2024-09-26 | End: 2024-09-26

## 2024-09-26 RX ORDER — ONDANSETRON 2 MG/ML
INJECTION INTRAMUSCULAR; INTRAVENOUS AS NEEDED
Status: DISCONTINUED | OUTPATIENT
Start: 2024-09-26 | End: 2024-09-26

## 2024-09-26 RX ORDER — PROPOFOL 10 MG/ML
INJECTION, EMULSION INTRAVENOUS AS NEEDED
Status: DISCONTINUED | OUTPATIENT
Start: 2024-09-26 | End: 2024-09-26

## 2024-09-26 RX ORDER — LIDOCAINE HYDROCHLORIDE 20 MG/ML
INJECTION, SOLUTION EPIDURAL; INFILTRATION; INTRACAUDAL; PERINEURAL AS NEEDED
Status: DISCONTINUED | OUTPATIENT
Start: 2024-09-26 | End: 2024-09-26

## 2024-09-26 RX ORDER — SODIUM CHLORIDE 9 MG/ML
50 INJECTION, SOLUTION INTRAVENOUS CONTINUOUS
Status: DISCONTINUED | OUTPATIENT
Start: 2024-09-26 | End: 2024-09-26 | Stop reason: HOSPADM

## 2024-09-26 RX ORDER — SODIUM CHLORIDE 9 MG/ML
INJECTION, SOLUTION INTRAVENOUS AS NEEDED
Status: DISCONTINUED | OUTPATIENT
Start: 2024-09-26 | End: 2024-09-26 | Stop reason: HOSPADM

## 2024-09-26 RX ORDER — CHLORHEXIDINE GLUCONATE ORAL RINSE 1.2 MG/ML
15 SOLUTION DENTAL ONCE
Status: COMPLETED | OUTPATIENT
Start: 2024-09-26 | End: 2024-09-26

## 2024-09-26 RX ORDER — DEXAMETHASONE SODIUM PHOSPHATE 10 MG/ML
INJECTION, SOLUTION INTRAMUSCULAR; INTRAVENOUS AS NEEDED
Status: DISCONTINUED | OUTPATIENT
Start: 2024-09-26 | End: 2024-09-26

## 2024-09-26 RX ORDER — FENTANYL CITRATE/PF 50 MCG/ML
25 SYRINGE (ML) INJECTION
Status: COMPLETED | OUTPATIENT
Start: 2024-09-26 | End: 2024-09-26

## 2024-09-26 RX ADMIN — SUGAMMADEX 200 MG: 100 INJECTION, SOLUTION INTRAVENOUS at 15:00

## 2024-09-26 RX ADMIN — FENTANYL CITRATE 50 MCG: 50 INJECTION, SOLUTION INTRAMUSCULAR; INTRAVENOUS at 13:04

## 2024-09-26 RX ADMIN — PROPOFOL 150 MG: 10 INJECTION, EMULSION INTRAVENOUS at 13:05

## 2024-09-26 RX ADMIN — CHLORHEXIDINE GLUCONATE 15 ML: 1.2 RINSE ORAL at 10:03

## 2024-09-26 RX ADMIN — FENTANYL CITRATE 25 MCG: 50 INJECTION, SOLUTION INTRAMUSCULAR; INTRAVENOUS at 15:41

## 2024-09-26 RX ADMIN — ROCURONIUM BROMIDE 30 MG: 10 INJECTION, SOLUTION INTRAVENOUS at 14:02

## 2024-09-26 RX ADMIN — PROPOFOL 50 MG: 10 INJECTION, EMULSION INTRAVENOUS at 14:02

## 2024-09-26 RX ADMIN — FENTANYL CITRATE 25 MCG: 50 INJECTION, SOLUTION INTRAMUSCULAR; INTRAVENOUS at 15:52

## 2024-09-26 RX ADMIN — FENTANYL CITRATE 25 MCG: 50 INJECTION, SOLUTION INTRAMUSCULAR; INTRAVENOUS at 16:16

## 2024-09-26 RX ADMIN — Medication 2000 MG: at 12:05

## 2024-09-26 RX ADMIN — ROCURONIUM BROMIDE 50 MG: 10 INJECTION, SOLUTION INTRAVENOUS at 13:24

## 2024-09-26 RX ADMIN — Medication 200 MG: at 13:05

## 2024-09-26 RX ADMIN — FENTANYL CITRATE 25 MCG: 50 INJECTION, SOLUTION INTRAMUSCULAR; INTRAVENOUS at 15:33

## 2024-09-26 RX ADMIN — MIDAZOLAM 2 MG: 1 INJECTION INTRAMUSCULAR; INTRAVENOUS at 12:52

## 2024-09-26 RX ADMIN — LIDOCAINE HYDROCHLORIDE 100 MG: 20 INJECTION, SOLUTION EPIDURAL; INFILTRATION; INTRACAUDAL; PERINEURAL at 13:04

## 2024-09-26 RX ADMIN — Medication 2000 MG: at 13:52

## 2024-09-26 RX ADMIN — DEXAMETHASONE SODIUM PHOSPHATE 10 MG: 10 INJECTION, SOLUTION INTRAMUSCULAR; INTRAVENOUS at 13:24

## 2024-09-26 RX ADMIN — SODIUM CHLORIDE 50 ML/HR: 0.9 INJECTION, SOLUTION INTRAVENOUS at 09:52

## 2024-09-26 RX ADMIN — ONDANSETRON 4 MG: 2 INJECTION INTRAMUSCULAR; INTRAVENOUS at 13:24

## 2024-09-26 RX ADMIN — FENTANYL CITRATE 50 MCG: 50 INJECTION, SOLUTION INTRAMUSCULAR; INTRAVENOUS at 13:14

## 2024-09-26 RX ADMIN — PROPOFOL 250 MG: 10 INJECTION, EMULSION INTRAVENOUS at 13:04

## 2024-09-26 NOTE — DISCHARGE INSTR - AVS FIRST PAGE
DISCHARGE INSTRUCTIONS  DIALYSIS FISTULA SURGERY    ACTIVITY:  Limit use of the operated arm to what is necessary for the first day after surgery. On the second day after surgery, you may start to increase use of your arm as tolerated.  Avoid heavy lifting (no more than 15 lbs) for the first one week.  You should start to exercise your hand on the side of the fistula by squeezing a stress ball or a rolled-up sock. This increases blood flow in your fistula and arm so your fistula will function better.    Feel for a thrill every day. The thrill is the vibration or pulse you feel over the fistula that means the blood is flowing through it. If you cannot feel a thrill, call our office ([phone]).    DIET:   Resume your normal diet.  Good nutrition is important for healing of your incision.    DRESSING:   You may have surgical glue at your surgical site.  There are stitches present under the skin which will absorb on their own.  The glue is used to cover the incision, assist in closure, and prevent contamination. This adhesive will darken and peel away on its own within one to two weeks. Do not pick at it.  If you have a dressing over your surgical site, remove this on the second day after surgery.      INCISION:   If you do not have a dialysis catheter in place, you may shower and get your incision wet.  Wash incision daily with soap and water, but do not rub or scrub the incision; rinse thoroughly and pat dry.  You may have stitches or staples to close your incision and it is okay for these to get wet.  Do not bathe in a tub or swim for the first 4 week following surgery or if you have any open wounds.  It is normal to have mild swelling or discoloration around the incision.   If increasing redness or pain develops, call our office immediately.  Numbness in the region of the incision may occur following the surgery.  This normally improves over six to twelve months.  If you have numbness or pain in your hand,  please call our office immediately.  DO NOT put any powders, creams, ointments, or lotions on your incision.     ARM SWELLING:    Most patients have some noticeable arm swelling after surgery.  This usually disappears within a few weeks.  If swelling is present, elevate the arm whenever possible.      RESTRICTIONS:   Do NOT have blood draws, IV's, or blood pressures performed on the operated arm.    FISTULA USE:    Your fistula will not be used until it has fully matured - approximately 6 to 12 weeks. If you are using a catheter for dialysis, this will not be removed until after your fistula has matured and is being used for dialysis without any issues.    FOLLOW UP STUDIES:  A Doppler ultrasound will be performed about 5-6 weeks after surgery.  Your surgeon will arrange this at your first postoperative visit.     FOLLOW UP APPOINTMENTS:  Making and keeping follow up appointments and ultrasound tests are important to your recovery.  If you have difficulty making it to or keeping your follow up appointments, call the office.    If you have increased pain, fever >101.5, increased drainage, redness or a bad smell at your surgery site, new coldness/numbness of your arm or leg, please call us immediately and GO directly to the ER.    PLEASE CALL THE OFFICE IF YOU HAVE ANY QUESTIONS  [phone]  [phone] [address], [address], [place] [address]  [address] [place] [address]  [address] [facility], [address], [place] [address]  [address] [place] [address]  [address] [facility], [address] [place] [address]  [address] [facility], [address], [place] [address]  [address] [place] [address]  [address] [facility], [address]st [address], [address], [place] [address]  [address], [place] [address]  [address] [facility], [address], [place] [address]

## 2024-09-26 NOTE — PERIOPERATIVE NURSING NOTE
Received patient from pacu, aoo vitals stable. Patient verbalizes pain to left arm fistula site as 5/10, understands that he has received pain narcotics in pacu and will be going home with a script for pain medicine should he need it. Patient given 4oz juice and is tolerating sips well without complaint. Patient incision to left arm fistula site Is CDI, dressing in place. Patient resting in bed, call light in reach and environment cleared. Plan of care ongoing.

## 2024-09-26 NOTE — INTERVAL H&P NOTE
H&P reviewed. After examining the patient I find no changes in the patients condition since the H&P had been written.    Vitals:    09/26/24 0941   BP: 116/69   Pulse: 75   Resp: 20   SpO2: 95%

## 2024-09-26 NOTE — OP NOTE
OPERATIVE REPORT  PATIENT NAME: Joaquin Frankel    :  1980  MRN: [id number]  Pt Location: [facility] OR ROOM 03    SURGERY DATE: 2024    Surgeons and Role:     * Scout James DO - Primary     * Nelly Cardoso MD - Assisting    Preop Diagnosis:  ESRD (end stage renal disease) on dialysis (HCC) [N18.6, Z99.2]    Post-Op Diagnosis Codes:     * ESRD (end stage renal disease) on dialysis (HCC) [N18.6, Z99.2]    Procedure(s):  Left - left arm proximal radio-cephalic AVF creation    Estimated Blood Loss:   Minimal    Anesthesia Type:   General endotracheal plus local anesthetic    Operative Indications:  ESRD (end stage renal disease) on dialysis (HCC) [N18.6, Z99.2]  44-year-old male currently on hemodialysis right-hand-dominant I discussed AV fistula and AV graft creation with him in the office and after discussion he gave written consent      Operative Findings:    Intraoperative vein mapping demonstrated adequate caliber basilic and cephalic vein in the upper arm and an adequate cephalic in the forearm after the creation of the fistula which was from the proximal radial to the cephalic vein there was a good thrill in the fistula and a palpable left radial pulse      Complications:   None    Procedure and Technique:  Informed consent was obtained from the appropriate part of the patient was correctly identified in the holding area brought to the operating room placed in the supine position appropriate lines and monitors were placed after satisfactory induction of general endotracheal anesthesia the left arm was prepped and draped in the normal sterile fashion.  A Jako timeout was performed and the patient received appropriate periprocedural antibiotics.  Preoperative vein mapping was done prior to prepping with the above-stated findings I elected to proceed with a upper arm cephalic fistula.  A transverse incision was made just distal to the elbow crease dissection was carried to the subtendinous tissue down  to the cephalic vein this was noted to be approximately 3 mm in diameter once this was circumferentially dissected out and controlled dissection was carried onto the bicipital aponeurosis which was then divided transversely the vessel beneath this was dissected which was dissected out appeared to be the radial this was just beyond the bifurcation of the brachial artery.  The radial artery had a strong pulse and was soft it was greater than 2 mm in diameter I elected to perform a fistula to the proximal radial artery.  The cephalic vein was clipped distally clamp proximally with a padded bulldog it was then divided near the clips slightly distended using a fine hemostat and then generously flushed using heparinized saline the radial artery was clamped proximally and distally distally using vascular clamps a longitudinal arteriotomy was made with an 11 blade extended with King scissors a standard end-to-side anastomosis was then fashioned using a running 6-0 Prolene prior to completing the suture line the vessels were antegrade and retrograde flushed the clamps were released there was noted to be a strong thrill in the fistula and a palpable left radial pulse at the wrist the anastomosis was hemostatic some local anesthetic was instilled and the incision was closed in 2 layers the skin was closed with 4-0 Monocryl in subcuticular fashion.  Glue was applied for dressing the patient tolerated the procedure well.         I was present for the entire procedure.    Patient Disposition:  PACU       Vascular Quality Initiative - Hemodialysis Access Placement    Pre-admission Information   Functional status: Fully active; able to carry on all predisease activities without restriction.     ESRD: ESRD: Hemodialysis dependent.  Current Access Type.: Tunneled Catheter    Historical Information      Previous Access: none    Access Type/Location:         Procedure Information      Status: Outpatient     Side:left      Anesthesia:  General     Access Type: Access Type: Surgical AVF Inflow Artery is: Radial Antecubital Intraoperative Artery taget diameter is: 2mm.  Outflow Vein is: Cephalic, Upper Arm.  Intraoperative Vein target diameter is: 3mm  Anastomosis configuration is: End to Side.  Cocomitant Procedure performed-: None    Completion Fistulogram: no     Preop ARTERIAL evaluation and/or treatment: duplex    Preop VENOUS evaluation and/or treatment: ultrasound mapping    *Obtain Target Diameters from study          Post op Information     Discharge Status: Home    Post op Complications: None             SIGNATURE: Scout James DO  DATE: September 26, 2024  TIME: 2:41 PM

## 2024-09-26 NOTE — ANESTHESIA POSTPROCEDURE EVALUATION
Post-Op Assessment Note    CV Status:  Stable  Pain Score: 0    Pain management: adequate       Mental Status:  Alert and awake   Hydration Status:  Stable   PONV Controlled:  None   Airway Patency:  Patent and adequate     Post Op Vitals Reviewed: Yes    No anethesia notable event occurred.    Staff: CRNA               /82 (09/26/24 1515)    Temp (!) 97.1 °F (36.2 °C) (09/26/24 1515)    Pulse 81 (09/26/24 1515)   Resp 18 (09/26/24 1515)    SpO2

## 2024-09-27 ENCOUNTER — PATIENT OUTREACH (OUTPATIENT)
Dept: CASE MANAGEMENT | Facility: OTHER | Age: 44
End: 2024-09-27

## 2024-09-27 NOTE — PROGRESS NOTES
ADT alert received for covering RN CM and chart review completed. Pt reported to [facility] for planned AV fistula creation.     Last RN CM outreach 9/11 with planned follow up in 3-4 weeks.   Yoli Carter RN CM remains on care team for follow up.

## 2024-09-30 ENCOUNTER — TELEPHONE (OUTPATIENT)
Dept: VASCULAR SURGERY | Facility: CLINIC | Age: 44
End: 2024-09-30

## 2024-09-30 NOTE — TELEPHONE ENCOUNTER
Vascular Nurse Navigator Post Op Phone Call    Post-Discharge phone call attempted to assess patient recovery after vascular surgery I left a message on answering machine. Will attempt to contact at later date.        Pt's chart was reviewed prior to call and leaving message.    Procedure: Left - left arm proximal radio-cephalic AVF creation     Date of Procedure: 9/26/24    Surgeon:    * Scout James,  - Primary     * Nelly Cardoso MD - Assisting      Anticoagulation pt was discharged on post op?: Aspirin and Warfarin (Coumadin or Jantoven)    Statin pt was discharged on post op?:  Lipitor (atorvastatin)    Dialysis Days and Location:  MWF at [facility]    Reminded pt of the following in message:    NEXT SCHEDULED OFFICE VISIT:  10/11/24 at 2 pm with Dr. James at [facility]    To contact [facility] with any concerns.

## 2024-09-30 NOTE — TELEPHONE ENCOUNTER
Vascular Nurse Navigator Post Op Call    Procedure: Left - left arm proximal radio-cephalic AVF creation      Date of Procedure: 9/26/24     Surgeon:    * Scout James DO - Primary     * Nelly Cardoso MD - Assisting      Painful tingling or numbness in your fingers?: No    Paleness/Coolness in hands/fingers?: No    Redness, swelling or pus from your wound?: No    Bleeding?: No    Thrill present?: Yes    Anticoagulation pt was discharged on post op?: Aspirin and Warfarin (Coumadin or Jantoven)    Statin pt was discharged on post op?:  Lipitor (atorvastatin)    Fever/chills?: No    Uncontrolled Pain?: No      Reviewed discharge instructions and incision care with patient.      Dialysis Days and Location: MWF at [facility]     NEXT SCHEDULED OFFICE VISIT:  10/11/24 at 2 pm with Dr. James at [facility]     Transportation Confirmed?: Yes      Any Questions or Concerns?    Patient stated that he is doing good since procedure.  He stated that he has soreness in his arm.  He stated that he has a bandage on his incision.  Reviewed incision care with him - wash daily with soap and water.  Advised him to change bandage daily, but not necessary to have incision covered.  Reviewed discharge medications - Aspirin and Coumadin.  All questions answered.  No concerns expressed at this time.

## 2024-10-07 ENCOUNTER — PATIENT OUTREACH (OUTPATIENT)
Dept: CASE MANAGEMENT | Facility: HOSPITAL | Age: 44
End: 2024-10-07

## 2024-10-07 NOTE — PROGRESS NOTES
Chart reviewed: Patient had LUE AVF placed on 9/26. Next Vascular apt on 10/11  10/24 scheduled for transplant apt [facility], PCP apt on 11/12.    This RNCM attempted to call patient. There was no answer and a message was left with a request for a call back. Outreach #2 and an UTR letter sent via My Chart and this RNCM removed self from care team. CCM episode and CP's closed.

## 2024-10-07 NOTE — LETTER
Date: 10/07/24  Dear Joaquin Frankel,   My name is Yoli; I am a registered nurse care manager working with Dr alcaraz's office.  I have not been able to reach you and would like to set a time that I can talk with you over the phone.  My work is to help patients that have complex medical conditions get the care they need. This includes patients who may have been in the hospital or emergency room.    Please call me with any questions you may have. I look forward to speaking with you.  Sincerely,  Yoli Carter, RNCM  [phone]  Outpatient Care Manager

## 2024-10-08 ENCOUNTER — VBI (OUTPATIENT)
Dept: ADMINISTRATIVE | Facility: OTHER | Age: 44
End: 2024-10-08

## 2024-10-08 NOTE — TELEPHONE ENCOUNTER
10/08/24 4:06 PM     Chart reviewed for Diabetic Eye Exam was/were submitted to the patient's insurance.     Maeve Mueller MA   [facility]

## 2024-10-10 NOTE — PROGRESS NOTES
Assessment/Plan:      There are no diagnoses linked to this encounter.    ESRD (end stage renal disease) on dialysis (HCC)  Lab Results   Component Value Date    EGFR 5 09/11/2024    EGFR 10 (L) 08/03/2024    EGFR 6 07/24/2024    CREATININE 9.71 (H) 09/11/2024    CREATININE 6.56 (H) 08/03/2024    CREATININE 9.46 (H) 07/24/2024   44-year-old gentleman presents for follow-up after AV fistula creation.  He has been on dialysis via a PermCath and had a left proximal radiocephalic fistula created by myself 2 weeks ago.  Since that time he has had no specific complaints he does have a small hematoma at his access site I am unable to palpate a thrill but by Doppler there does appear to be brisk flow in the cephalic vein.  He has a palpable left distal radial pulse and normal  strength in his left upper extremity at this point will need further time to allow for maturation of his fistula is possible that his fistula is not maturing well or it is also possible that due to his body habitus given that he is obese that it may not be easy to appreciate the thrill on palpation.  At this point we will plan for duplex for assessment of maturation in 1 month and office visit with myself once that duplex is complete.      Subjective:     Patient ID: Joaquin Frankel is a 44 y.o. male.    Patient is s/p LUE radio-cephalic AV fistula creation on 9/26/24 and presents today for post-op visit.     HPI    Review of Systems   Constitutional: Negative.    HENT: Negative.     Eyes: Negative.    Respiratory: Negative.     Cardiovascular: Negative.    Gastrointestinal: Negative.    Endocrine: Negative.    Genitourinary: Negative.    Musculoskeletal: Negative.    Skin: Negative.    Allergic/Immunologic: Negative.    Neurological: Negative.    Hematological: Negative.    Psychiatric/Behavioral: Negative.         I have reviewed the ROS above and made changes as needed.      Objective:     Physical Exam      General  Exam: alert, awake,  This patient's drug insurance does have preferred providers, I called [facility] -[phone], in network pharmacies are as follows, [facility], [facility], [facility]. Mail order option is the [facility] Mail order [phone]   oriented, no distress, consistent with stated age    Integumentary  Exam: warm, dry, no gross lesions, no bruises and normal color    Upper Extremity:  Palpation: Radial pulse- Bilateral 2+  Left incisional small hematoma  Incision c/d/I  Unable to palpable thrill  +ve multiphasic doppler signal present in cephalic vein  Normal LUE  strength    Neurologic  Exam:alert, non-focal, oriented x 3, cranial nerves II-XII grossly intact

## 2024-10-11 ENCOUNTER — TELEPHONE (OUTPATIENT)
Dept: VASCULAR SURGERY | Facility: CLINIC | Age: 44
End: 2024-10-11

## 2024-10-11 ENCOUNTER — OFFICE VISIT (OUTPATIENT)
Dept: VASCULAR SURGERY | Facility: CLINIC | Age: 44
End: 2024-10-11

## 2024-10-11 VITALS
HEIGHT: 67 IN | BODY MASS INDEX: 49.44 KG/M2 | DIASTOLIC BLOOD PRESSURE: 70 MMHG | SYSTOLIC BLOOD PRESSURE: 128 MMHG | WEIGHT: 315 LBS | HEART RATE: 80 BPM

## 2024-10-11 DIAGNOSIS — N18.6 ESRD (END STAGE RENAL DISEASE) ON DIALYSIS (HCC): Primary | ICD-10-CM

## 2024-10-11 DIAGNOSIS — Z99.2 ESRD (END STAGE RENAL DISEASE) ON DIALYSIS (HCC): Primary | ICD-10-CM

## 2024-10-11 PROCEDURE — 99024 POSTOP FOLLOW-UP VISIT: CPT | Performed by: SURGERY

## 2024-10-11 NOTE — ASSESSMENT & PLAN NOTE
Lab Results   Component Value Date    EGFR 5 09/11/2024    EGFR 10 (L) 08/03/2024    EGFR 6 07/24/2024    CREATININE 9.71 (H) 09/11/2024    CREATININE 6.56 (H) 08/03/2024    CREATININE 9.46 (H) 07/24/2024   44-year-old gentleman presents for follow-up after AV fistula creation.  He has been on dialysis via a PermCath and had a left proximal radiocephalic fistula created by myself 2 weeks ago.  Since that time he has had no specific complaints he does have a small hematoma at his access site I am unable to palpate a thrill but by Doppler there does appear to be brisk flow in the cephalic vein.  He has a palpable left distal radial pulse and normal  strength in his left upper extremity at this point will need further time to allow for maturation of his fistula is possible that his fistula is not maturing well or it is also possible that due to his body habitus given that he is obese that it may not be easy to appreciate the thrill on palpation.  At this point we will plan for duplex for assessment of maturation in 1 month and office visit with myself once that duplex is complete.

## 2024-10-11 NOTE — TELEPHONE ENCOUNTER
Called pt and lmom to inform him I got his u/s scheduled for 11/26 at 2pm at [facility] and we need to schedule an OV with Dr. James after the u/s

## 2024-10-17 NOTE — HEMODIALYSIS
Post-Dialysis RN Treatment Note    Blood Pressure:  Pre 144/71 mm/Hg  Post 123/67 mmHg   EDW  TBD kg    Weight:  Pre 183 kg   Post 179.7 kg   Mode of weight measurement: Bed Scale   Volume Removed  3500 ml    Treatment duration 210 minutes    NS given  No    Treatment shortened? No   Medications given during Rx None Reported   Estimated Kt/V  None Reported   Access type: Permacath/TDC   Access Issues: Yes, describe: Lines reversed. Redness at catheter site     Report called to primary nurse   Yes Chely CELIS RN  
PAST MEDICAL HISTORY:  Bladder outlet obstruction     BPH with obstruction/lower urinary tract symptoms     Colostomy status     DVT, lower extremity     Erectile dysfunction     H/O cardiac murmur     H/O urinary retention suprapubic tube    HLD (hyperlipidemia)     Hypertension     Polio     Presence of suprapubic catheter

## 2024-10-23 ENCOUNTER — TELEPHONE (OUTPATIENT)
Age: 44
End: 2024-10-23

## 2024-10-23 DIAGNOSIS — Z99.2 ESRD (END STAGE RENAL DISEASE) ON DIALYSIS (HCC): Primary | ICD-10-CM

## 2024-10-23 DIAGNOSIS — N18.6 ESRD (END STAGE RENAL DISEASE) ON DIALYSIS (HCC): Primary | ICD-10-CM

## 2024-10-23 NOTE — TELEPHONE ENCOUNTER
Brian with [facility] calling in regards to patients fistula that was created 9/26/24 by Dr. James. Brian reports there is not thrill or bruit today during patient's dialysis. She also states it was barely noticeable on Monday when he was there for dialysis.

## 2024-10-29 ENCOUNTER — TELEPHONE (OUTPATIENT)
Age: 44
End: 2024-10-29

## 2024-10-29 ENCOUNTER — HOSPITAL ENCOUNTER (OUTPATIENT)
Dept: RADIOLOGY | Facility: HOSPITAL | Age: 44
Discharge: HOME | End: 2024-10-29
Payer: MEDICARE

## 2024-10-29 DIAGNOSIS — N18.6 ESRD (END STAGE RENAL DISEASE) ON DIALYSIS (HCC): ICD-10-CM

## 2024-10-29 DIAGNOSIS — Z99.2 ESRD (END STAGE RENAL DISEASE) ON DIALYSIS (HCC): ICD-10-CM

## 2024-10-29 PROCEDURE — 93990 DOPPLER FLOW TESTING: CPT

## 2024-10-29 NOTE — TELEPHONE ENCOUNTER
Reviewed. Please have patient follow up with Dr. James to review duplex, preferably sooner than 12/13.

## 2024-10-29 NOTE — TELEPHONE ENCOUNTER
Venkata from [place] [facility] calling regarding patient's HD duplex done today:    She reports venous occlusion in AVF, negative thrill or bruit.

## 2024-10-30 PROCEDURE — 93990 DOPPLER FLOW TESTING: CPT | Performed by: SURGERY

## 2024-10-30 NOTE — TELEPHONE ENCOUNTER
Called and s/w pt, he is currently at HD and not having issues. He would like to know if he can have a virtual visit or if it really necessary that he comes to the office ?

## 2024-10-31 DIAGNOSIS — N18.6 BENIGN HYPERTENSION WITH ESRD (END-STAGE RENAL DISEASE) (HCC): Primary | ICD-10-CM

## 2024-10-31 DIAGNOSIS — I12.0 BENIGN HYPERTENSION WITH ESRD (END-STAGE RENAL DISEASE) (HCC): Primary | ICD-10-CM

## 2024-10-31 DIAGNOSIS — E83.39 HYPERPHOSPHATEMIA: ICD-10-CM

## 2024-10-31 DIAGNOSIS — N19 RENAL FAILURE: ICD-10-CM

## 2024-10-31 DIAGNOSIS — N18.6 ESRD (END STAGE RENAL DISEASE) ON DIALYSIS (HCC): ICD-10-CM

## 2024-10-31 DIAGNOSIS — Z99.2 ESRD (END STAGE RENAL DISEASE) ON DIALYSIS (HCC): ICD-10-CM

## 2024-10-31 DIAGNOSIS — I10 PRIMARY HYPERTENSION: ICD-10-CM

## 2024-10-31 RX ORDER — METOLAZONE 10 MG/1
10 TABLET ORAL
Qty: 50 TABLET | Refills: 1 | Status: SHIPPED | OUTPATIENT
Start: 2024-10-31

## 2024-10-31 RX ORDER — SEVELAMER HYDROCHLORIDE 800 MG/1
800 TABLET, FILM COATED ORAL
Qty: 540 TABLET | Refills: 1 | Status: SHIPPED | OUTPATIENT
Start: 2024-10-31

## 2024-10-31 RX ORDER — CALCIUM ACETATE 667 MG/1
1334 CAPSULE ORAL 3 TIMES DAILY
Qty: 540 CAPSULE | Refills: 1 | Status: SHIPPED | OUTPATIENT
Start: 2024-10-31

## 2024-10-31 RX ORDER — TORSEMIDE 100 MG/1
100 TABLET ORAL
Start: 2024-10-31 | End: 2024-10-31 | Stop reason: SDUPTHER

## 2024-10-31 RX ORDER — TORSEMIDE 100 MG/1
100 TABLET ORAL
Qty: 50 TABLET | Refills: 1 | Status: SHIPPED | OUTPATIENT
Start: 2024-10-31 | End: 2025-04-29

## 2024-10-31 RX ORDER — CARVEDILOL 6.25 MG/1
6.25 TABLET ORAL 2 TIMES DAILY WITH MEALS
Qty: 180 TABLET | Refills: 1 | Status: SHIPPED | OUTPATIENT
Start: 2024-10-31 | End: 2025-04-29

## 2024-10-31 RX ORDER — NIFEDIPINE 30 MG/1
30 TABLET, EXTENDED RELEASE ORAL
Qty: 90 TABLET | Refills: 1 | Status: SHIPPED | OUTPATIENT
Start: 2024-10-31 | End: 2025-07-28

## 2024-11-05 ENCOUNTER — RA CDI HCC (OUTPATIENT)
Dept: OTHER | Facility: HOSPITAL | Age: 44
End: 2024-11-05

## 2024-11-05 NOTE — PROGRESS NOTES
HCC coding opportunities          Chart Reviewed number of suggestions sent to Provider: 1  [id number]     Patients Insurance     Medicare Insurance: Medicare

## 2024-11-09 RX ORDER — FERROUS SULFATE 324(65)MG
324 TABLET, DELAYED RELEASE (ENTERIC COATED) ORAL
COMMUNITY
Start: 2024-08-14

## 2024-11-12 ENCOUNTER — OFFICE VISIT (OUTPATIENT)
Dept: SLEEP CENTER | Facility: CLINIC | Age: 44
End: 2024-11-12
Payer: MEDICARE

## 2024-11-12 ENCOUNTER — OFFICE VISIT (OUTPATIENT)
Dept: FAMILY MEDICINE CLINIC | Facility: CLINIC | Age: 44
End: 2024-11-12
Payer: MEDICARE

## 2024-11-12 VITALS — HEART RATE: 76 BPM | OXYGEN SATURATION: 96 % | BODY MASS INDEX: 49.44 KG/M2 | WEIGHT: 315 LBS | HEIGHT: 67 IN

## 2024-11-12 VITALS
TEMPERATURE: 98.3 F | SYSTOLIC BLOOD PRESSURE: 100 MMHG | RESPIRATION RATE: 16 BRPM | BODY MASS INDEX: 49.44 KG/M2 | WEIGHT: 315 LBS | HEART RATE: 78 BPM | HEIGHT: 67 IN | DIASTOLIC BLOOD PRESSURE: 62 MMHG

## 2024-11-12 DIAGNOSIS — I10 PRIMARY HYPERTENSION: ICD-10-CM

## 2024-11-12 DIAGNOSIS — N18.6 ESRD (END STAGE RENAL DISEASE) ON DIALYSIS (HCC): ICD-10-CM

## 2024-11-12 DIAGNOSIS — E66.01 MORBID OBESITY (HCC): ICD-10-CM

## 2024-11-12 DIAGNOSIS — Z99.2 TYPE 2 DIABETES MELLITUS WITH CHRONIC KIDNEY DISEASE ON CHRONIC DIALYSIS, WITHOUT LONG-TERM CURRENT USE OF INSULIN (HCC): ICD-10-CM

## 2024-11-12 DIAGNOSIS — I89.0 CHRONIC ACQUIRED LYMPHEDEMA: ICD-10-CM

## 2024-11-12 DIAGNOSIS — G47.19 EXCESSIVE DAYTIME SLEEPINESS: ICD-10-CM

## 2024-11-12 DIAGNOSIS — N18.6 TYPE 2 DIABETES MELLITUS WITH CHRONIC KIDNEY DISEASE ON CHRONIC DIALYSIS, WITHOUT LONG-TERM CURRENT USE OF INSULIN (HCC): ICD-10-CM

## 2024-11-12 DIAGNOSIS — N18.6 ESRD (END STAGE RENAL DISEASE) ON DIALYSIS (HCC): Primary | ICD-10-CM

## 2024-11-12 DIAGNOSIS — I26.99 PULMONARY EMBOLISM (HCC): ICD-10-CM

## 2024-11-12 DIAGNOSIS — Z99.2 ESRD (END STAGE RENAL DISEASE) ON DIALYSIS (HCC): Primary | ICD-10-CM

## 2024-11-12 DIAGNOSIS — Z99.2 ESRD (END STAGE RENAL DISEASE) ON DIALYSIS (HCC): ICD-10-CM

## 2024-11-12 DIAGNOSIS — E11.22 TYPE 2 DIABETES MELLITUS WITH CHRONIC KIDNEY DISEASE ON CHRONIC DIALYSIS, WITHOUT LONG-TERM CURRENT USE OF INSULIN (HCC): ICD-10-CM

## 2024-11-12 DIAGNOSIS — G47.9 SLEEP DISTURBANCE: Primary | ICD-10-CM

## 2024-11-12 DIAGNOSIS — Z99.2 DIALYSIS PATIENT (HCC): ICD-10-CM

## 2024-11-12 PROCEDURE — 99214 OFFICE O/P EST MOD 30 MIN: CPT | Performed by: FAMILY MEDICINE

## 2024-11-12 PROCEDURE — G2211 COMPLEX E/M VISIT ADD ON: HCPCS | Performed by: STUDENT IN AN ORGANIZED HEALTH CARE EDUCATION/TRAINING PROGRAM

## 2024-11-12 PROCEDURE — 99204 OFFICE O/P NEW MOD 45 MIN: CPT | Performed by: STUDENT IN AN ORGANIZED HEALTH CARE EDUCATION/TRAINING PROGRAM

## 2024-11-12 PROCEDURE — G2211 COMPLEX E/M VISIT ADD ON: HCPCS | Performed by: FAMILY MEDICINE

## 2024-11-12 RX ORDER — WARFARIN SODIUM 10 MG/1
15 TABLET ORAL
Qty: 135 TABLET | Refills: 5 | Status: SHIPPED | OUTPATIENT
Start: 2024-11-12

## 2024-11-12 NOTE — ASSESSMENT & PLAN NOTE
Obesity - We reviewed the association of obesity on obstructive sleep apnea and sleep disordered breathing. Encouraged patient to follow healthy diet, regular exercise regimen, and pursue weight loss to manage symptoms.

## 2024-11-12 NOTE — PATIENT INSTRUCTIONS
Our goal with your INR level is 2-3 or close to it and no less often than every 4 weeks.    Currently you are taking 10mg/d alternating with 15mg/d.  Since your INR is less than 2, please take 15mg every and check the INR using my order in 3-4 days.

## 2024-11-12 NOTE — ASSESSMENT & PLAN NOTE
We reviewed the association between sleep and glucose control and insulin sensitivity.  Encouraged the patient to aim for at least 7 hours of sleep nightly.  Lab Results   Component Value Date    HGBA1C 5.8 (H) 10/24/2024       Orders:    Diagnostic Sleep Study; Future

## 2024-11-12 NOTE — PATIENT INSTRUCTIONS
"- A sleep study was ordered for you. It can be a in lab sleep study or a portable at home sleep study. It depends on what the insurance approves.  Some insurances will only cover home sleep studies unless you have significant medical conditions like stroke or heart attack within the last 6 months, severe COPD, or the use of supplemental oxygen.    - The order goes electronically to the sleep center staff.    - The sleep center will get approval from your insurance and then will call you to schedule the sleep study.    - If you do not hear from the sleep center in 1 week, please call us to check the status of your order.    - There are different locations to get sleep study done.    - Please make sure you know what is the copay associated with your sleep study. Approval does not mean coverage.     - Once your sleep study is done, it can take up to 2 weeks to get results (depending on the volume).    - A follow up appointment to discuss sleep study results is required in most cases. On that visit, we will discuss results and treatment options.    - If your sleep study shows severe sleep apnea please expect contact from the sleep center. We will try to expedite your follow up appointment.    - The best way to communicate with us in through [facility]. Make sure your account is active.    - Once you start CPAP therapy, it is mandatory by insurance, to have a follow up appointment with us within 31-90 days of getting CPAP.       Patient Education     Sleep apnea in adults   The Basics   Written by the doctors and editors at [facility]   What is sleep apnea? -- Sleep apnea is a condition that makes you stop breathing for short periods while you are asleep. There are 2 types of sleep apnea. One is called \"obstructive sleep apnea.\" The other is called \"central sleep apnea.\"  In obstructive sleep apnea, you stop breathing because your throat narrows or closes (figure 1). In central sleep apnea, you stop breathing because " "your brain does not send the right signals to your muscles to make you breathe. When people talk about sleep apnea, they are usually referring to obstructive sleep apnea, which is what this article is about.  People with sleep apnea do not know that they stop breathing when they are asleep. But they do sometimes wake up startled or gasping for breath. They also often hear from loved ones that they snore.  What are the symptoms of sleep apnea? -- The main symptoms of sleep apnea are loud snoring, tiredness, and daytime sleepiness. Other symptoms can include:   Restless sleep   Waking up choking or gasping   Morning headaches, dry mouth, or sore throat   Waking up often to urinate   Waking up feeling unrested or groggy   Trouble thinking clearly or remembering things  Some people with sleep apnea don't have symptoms, or don't realize that they have them.  Should I see a doctor or nurse? -- Yes. If you think that you might have sleep apnea, see your doctor.  Is there a test for sleep apnea? -- Yes. First, your doctor or nurse will ask about your symptoms. If you have a bed partner, they might also ask that person if you snore or gasp in your sleep. If the doctor or nurse suspects that you have sleep apnea, they might send you for a \"sleep study.\"  Sleep studies can sometimes be done at home, but they are usually done in a sleep lab. For the study, you spend the night in the lab, and you are hooked up to different machines that monitor your heart rate, breathing, and other body functions. The results of the test tell your doctor or nurse if you have the disorder.  Is there anything I can do on my own to help my sleep apnea? -- Yes. Some things that might help:   Try to avoid sleeping on your back, if possible. This might help some people.   Lose weight, if you have excess body weight.   Get regular physical activity. This might help you lose weight. But even if it doesn't, being active is good for your health.   Avoid " "alcohol, especially in the evening. Alcohol can make sleep apnea worse.  How is sleep apnea treated? -- Treatment can include:   Weight loss - As mentioned above, weight loss can help if you have excess weight or obesity. But losing weight can be challenging, and it takes time to lose enough weight to help with your sleep apnea. Most people need other treatment while they work on losing weight.   CPAP - The most effective treatment for sleep apnea is a device that keeps your airway open while you sleep. Treatment with this device is called \"continuous positive airway pressure\" (\"CPAP\"). People getting CPAP wear a face mask at night that keeps them breathing (figure 2).  If your doctor or nurse recommends a CPAP machine, be patient about using it. The mask might seem uncomfortable to wear at first, and the machine might seem noisy, but using the machine can really help you. People with sleep apnea who use a CPAP machine feel more rested and generally feel better.  If CPAP does not work, your doctor might suggest other treatment. Options might include:   An oral device - This is a device that you wear in your mouth. It is called an \"oral appliance\" or \"mandibular advancement device.\" It helps keep your airway open while you sleep.   Hypoglossal nerve stimulation - This involves a procedure to implant a small device into your chest. The device has a wire that connects to the nerve under your tongue. While you are sleeping, it sends an electrical signal that causes the tongue to push forward. This helps open up your airway.   Surgery to widen your airway - This might involve removing your tonsils or other tissue that blocks the airway.  Is sleep apnea dangerous? -- It can be. Risks include:   Accidents - People with sleep apnea do not get good-quality sleep, so they are often tired and not alert. This puts them at risk for car accidents and other types of accidents.   Other health problems - Studies show that people " with sleep apnea are more likely than others to have high blood pressure, heart attacks, and other serious heart problems. Some people also have mood changes or depression.  In people with severe sleep apnea, getting treated (for example, with CPAP) can help lower these risks.  All topics are updated as new evidence becomes available and our peer review process is complete.  This topic retrieved from [employer] on: Feb 28, 2024.  Topic 19505 Version 18.0  Release: 32.2.4 - C32.58  © 2024 UpToDate, Inc. and/or its affiliates. All rights reserved.  figure 1: Airway in a person with sleep apnea     Normally, when a person sleeps, the airway remains open, and air can pass from the nose and mouth to the lungs. In a person with sleep apnea, parts of the throat and mouth drop into the airway and block off the flow of air. This can cause loud snoring and interrupt breathing for short periods.  Graphic 41701 Version 6.0  figure 2: Continuous positive airway pressure (CPAP) for sleep apnea     The CPAP mask gently blows air into your nose while you sleep. It puts just enough pressure on your airway to keep it from closing. The mask in this picture fits over just the nose. Other CPAP devices have masks that fit over the nose and mouth.  Graphic 17467 Version 5.0  Consumer Information Use and Disclaimer   Disclaimer: This generalized information is a limited summary of diagnosis, treatment, and/or medication information. It is not meant to be comprehensive and should be used as a tool to help the user understand and/or assess potential diagnostic and treatment options. It does NOT include all information about conditions, treatments, medications, side effects, or risks that may apply to a specific patient. It is not intended to be medical advice or a substitute for the medical advice, diagnosis, or treatment of a health care provider based on the health care provider's examination and assessment of a patient's specific and unique  circumstances. Patients must speak with a health care provider for complete information about their health, medical questions, and treatment options, including any risks or benefits regarding use of medications. This information does not endorse any treatments or medications as safe, effective, or approved for treating a specific patient. UpToDate, Inc. and its affiliates disclaim any warranty or liability relating to this information or the use thereof.The use of this information is governed by the Terms of Use, available at https://www.woltersSpearFyshuwer.com/en/know/clinical-effectiveness-terms. 2024© UpToDate, Inc. and its affiliates and/or licensors. All rights reserved.  Copyright   © 2024 UpToDate, Inc. and/or its affiliates. All rights reserved.

## 2024-11-12 NOTE — PROGRESS NOTES
Assessment/Plan:    No problem-specific Assessment & Plan notes found for this encounter.    DM2 stable w/o meds  Monitor  Wa 5.8    ESRD unchanged    Left lymphedema  Can f/u with vascular for options  Aware of lymphedema PT    PE  Must check INR regularly  Risks of bleeding aware  Report outside INR to our office     Diagnoses and all orders for this visit:    ESRD (end stage renal disease) on dialysis (HCC)    Dialysis patient (HCC)    Chronic acquired lymphedema    Primary hypertension    Pulmonary embolism (HCC)  -     warfarin (COUMADIN) 10 mg tablet; Take 1.5 tablets (15 mg total) by mouth daily    Other orders  -     ferrous sulfate 324 MG TBEC; Take 324 mg by mouth (Patient not taking: Reported on 11/12/2024)        Return in about 6 months (around 5/12/2025) for Recheck.    Subjective:      Patient ID: Joaquin Frankel is a 44 y.o. male.    Chief Complaint   Patient presents with    Follow-up     Med management  Joaquina Whiteside MA        HPI  Working on transplant, renal  On warfarin  For PE  Last INR 1.3 on 10/24/24  On 10mg/d alt with 15mg/d    No major bleeding  No rectal bleeding    The following portions of the patient's history were reviewed and updated as appropriate: allergies, current medications, past family history, past medical history, past social history, past surgical history and problem list.    Review of Systems   Constitutional:  Negative for chills and fever.   Respiratory:  Negative for shortness of breath.          Current Outpatient Medications   Medication Sig Dispense Refill    aspirin (ECOTRIN LOW STRENGTH) 81 mg EC tablet Take 1 tablet (81 mg total) by mouth daily 90 tablet 2    atorvastatin (LIPITOR) 80 mg tablet Take 1 tablet (80 mg total) by mouth daily 90 tablet 3    b complex-vitamin C-folic acid (RENAL) 1 mg Take 1 capsule by mouth daily with dinner 30 capsule 8    calcium acetate (PHOSLO) capsule Take 2 capsules (1,334 mg total) by mouth 3 (three) times a day 540 capsule  "1    carvedilol (COREG) 6.25 mg tablet Take 1 tablet (6.25 mg total) by mouth 2 (two) times a day with meals 180 tablet 1    Cholecalciferol (VITAMIN D3) 1,000 units tablet Take 2 tablets (2,000 Units total) by mouth daily 60 tablet 5    isosorbide mononitrate (IMDUR) 60 mg 24 hr tablet Take 1 tablet (60 mg total) by mouth daily 90 tablet 2    metolazone (ZAROXOLYN) 10 mg tablet Take 1 tablet (10 mg total) by mouth 4 (four) times a week 50 tablet 1    NIFEdipine (PROCARDIA XL) 30 mg 24 hr tablet Take 1 tablet (30 mg total) by mouth daily at bedtime 90 tablet 1    sevelamer (RENAGEL) 800 mg tablet Take 1 tablet (800 mg total) by mouth 3 (three) times a day with meals 540 tablet 1    torsemide (DEMADEX) 100 mg tablet Take 1 tablet (100 mg total) by mouth 4 (four) times a week 50 tablet 1    warfarin (COUMADIN) 10 mg tablet Take 1.5 tablets (15 mg total) by mouth daily 135 tablet 5    ferrous sulfate 324 MG TBEC Take 324 mg by mouth (Patient not taking: Reported on 11/12/2024)       No current facility-administered medications for this visit.       Objective:    /62   Pulse 78   Temp 98.3 °F (36.8 °C)   Resp 16   Ht 5' 7\" (1.702 m)   Wt (!) 173 kg (382 lb)   BMI 59.83 kg/m²        Physical Exam  Vitals and nursing note reviewed.   Constitutional:       General: He is not in acute distress.     Appearance: He is well-developed. He is obese. He is not ill-appearing.   HENT:      Head: Normocephalic.      Right Ear: Tympanic membrane normal.      Left Ear: Tympanic membrane normal.      Mouth/Throat:      Mouth: Mucous membranes are moist.   Eyes:      General: No scleral icterus.     Conjunctiva/sclera: Conjunctivae normal.   Cardiovascular:      Rate and Rhythm: Normal rate and regular rhythm.      Heart sounds: No murmur heard.  Pulmonary:      Effort: Pulmonary effort is normal. No respiratory distress.      Breath sounds: No wheezing.   Abdominal:      Palpations: Abdomen is soft.      Tenderness: There " is no abdominal tenderness.   Musculoskeletal:         General: No deformity.      Cervical back: Neck supple.      Right lower leg: No edema.      Left lower leg: Edema present.   Skin:     General: Skin is warm and dry.      Coloration: Skin is not pale.   Neurological:      Mental Status: He is alert.      Motor: No weakness.      Gait: Gait normal.   Psychiatric:         Mood and Affect: Mood normal.         Behavior: Behavior normal.         Thought Content: Thought content normal.                Hany Mcfarland DO

## 2024-11-12 NOTE — ASSESSMENT & PLAN NOTE
Lab Results   Component Value Date    EGFR 9 (L) 10/24/2024    EGFR 5 09/11/2024    EGFR 10 (L) 08/03/2024    CREATININE 7.44 (H) 10/24/2024    CREATININE 9.71 (H) 09/11/2024    CREATININE 6.56 (H) 08/03/2024   Patient now on hemodialysis, on Mondays Wednesdays and Fridays.  He had recent placement of AV fistula.  Reviewed the association of sleep disorders and PLM's in patients with ESRD.  Will proceed with sleep study as above.  If extensive PLM's exist can consider treatment with auto CPAP as above.

## 2024-11-12 NOTE — PROGRESS NOTES
Ambulatory Visit  Name: Joaquin Frankel      : 1980      MRN: [id number]  Encounter Provider: Scout James DO  Encounter Date: 2024   Encounter department: [facility] [place]    Assessment & Plan  ESRD (end stage renal disease) on dialysis ([facility])  Lab Results   Component Value Date    EGFR 9 (L) 10/24/2024    EGFR 5 2024    EGFR 10 (L) 2024    CREATININE 7.44 (H) 10/24/2024    CREATININE 9.71 (H) 2024    CREATININE 6.56 (H) 2024     43-year-old male presents for follow-up for dialysis access.  He is right-hand dominant and been on dialysis via a PermCath since .  He saw me a few months ago and had a left proximal radiocephalic fistula created at the end of September.  Unfortunately this failed to mature and he subsequently had a duplex that confirmed lack of maturation.  At the time of his initial procedure the intraoperative vein mapping did demonstrate an adequate caliber upper arm basilic vein I discussed options with him at this point I did recommend proceeding with a left upper arm brachiobasilic fistula creation which would need to be done in 2 stages.  I did also discussed with him in the event that the basilic vein is inadequate in the left arm options at that point would be to proceed with either a left upper arm AV graft or a right upper extremity dominant arm autogenous access.  Given his young age I did recommend that he consider the right upper extremity autogenous access which he is agreeable to we will plan at this point to move forward with the left upper extremity brachiobasilic fistula creation and if this is in adequate intraoperatively we will reschedule him for the right arm.  Risks benefits and alternative therapies were discussed with him in detail he consented to proceed he will need to hold his Coumadin for 5 days prior to the procedure.      Orders:    Case request operating room: left arm AVF creation;  "Standing    Lymphedema    Orders:    Ambulatory Referral to PT/OT Lymphedema Therapy; Future      History of Present Illness       Patient presents today to review HD access duplex s/p LUE radio-cephalic AV fistula creation 9/26/24.     Joaquin Frankel is a 44 y.o. male     History obtained from : patient  Review of Systems   Constitutional: Negative.    HENT: Negative.     Eyes: Negative.    Respiratory: Negative.     Cardiovascular:  Positive for leg swelling.   Gastrointestinal: Negative.    Endocrine: Negative.    Genitourinary: Negative.    Musculoskeletal: Negative.    Skin: Negative.    Allergic/Immunologic: Negative.    Neurological: Negative.    Hematological: Negative.    Psychiatric/Behavioral: Negative.     I have reviewed the ROS above and made changes as needed.        Objective     /80 (BP Location: Right arm, Patient Position: Sitting)   Pulse 76   Ht 5' 7\" (1.702 m)   Wt (!) 171 kg (376 lb)   BMI 58.89 kg/m²     Physical Exam    General  Exam: alert, awake, oriented, no distress, consistent with stated age    Integumentary  Exam: warm, dry, no gross lesions, no bruises and normal color      Upper Extremity:  1+ left radial pulse distally    Left arm incision healed  Absent thrill in AVF  Normal LUE  strength    Neurologic  Exam:alert, non-focal, oriented x 3, cranial nerves II-XII grossly intact      Administrative Statements   I have spent a total time of 30 minutes in caring for this patient on the day of the visit/encounter including Diagnostic results, Prognosis, Risk factor reductions, Reviewing / ordering tests, medicine, procedures  , and Obtaining or reviewing history  .        Operative Scheduling Information:    Hospital:  [facility]    Physician:  Erika    Surgery: Left arm AVF creation    Urgency:  Standard    Level:  Level 4: Outpatients to be scheduled for screening procedures and elective surgery that can be delayed for longer than one month without reasonable expectation " of detriment to patient.    Case Length:  Normal    Post-op Bed:  Outpatient    OR Table:  Standard    Equipment Needs:  None    Medication Instructions:  Coumadin:  Hold for 7 days prior to procedure    Hydration:  No    Contrast Allergy:  no

## 2024-11-12 NOTE — PROGRESS NOTES
Ambulatory Visit  Name: Joaquin Frankel      : 1980      MRN: [id number]  Encounter Provider: Joaquin Camara MD  Encounter Date: 2024   Encounter department: [facility] SLEEP MEDICINE [place]    Assessment & Plan  Sleep disturbance  Obstructive Sleep Apnea - Discussed pathophysiology of ANGY, consequences of untreated ANGY and treatment options including PAP therapy, mandibular advancement device, positional therapy, and surgical referral. - Discussed risks of sleepy driving and mitigation strategies (napping). Patient agrees to not drive if tired or sleepy. - Advised avoidance of alcohol and centrally acting medications as these can worsen ANGY.  -Patient presents with chief complaint of snoring and witnessed apneas.  He has an [place] sleep scale of 10.  Prior to initiation of hemodialysis he noted marked nocturnal awakenings for nocturia with significant sleep fragmentation.  I have placed a sleep study for further evaluation for concern of sleep disordered breathing.  - If the sleep study is positive the patient I discussed extensively of the use of CPAP therapy.  The patient understands that he would have to use CPAP for at least 70% of nights for at least 4 hours nightly.  Patient verbalized understanding.  Orders:    Diagnostic Sleep Study; Future    Excessive daytime sleepiness  The differential for excessive daytime sleepiness is broad.  It includes insufficient sleep, medical and psychiatric conditions, neurologic conditions, central disorders of hypersomnolence, or sleep disordered breathing.  I have ordered a sleep study as above to rule out sleep disordered breathing as an etiology of excessive daytime sleepiness.  Orders:    Diagnostic Sleep Study; Future    Primary hypertension  Hypertension - We reviewed the association between untreated obstructive sleep apnea and the increased risk for hypertension. Patient to continue on prescribed anti-hypertensive therapy and follow up  "with PCP for continuity of care.   Orders:    Diagnostic Sleep Study; Future    Type 2 diabetes mellitus with chronic kidney disease on chronic dialysis, without long-term current use of insulin (HCC)  We reviewed the association between sleep and glucose control and insulin sensitivity.  Encouraged the patient to aim for at least 7 hours of sleep nightly.  Lab Results   Component Value Date    HGBA1C 5.8 (H) 10/24/2024       Orders:    Diagnostic Sleep Study; Future    ESRD (end stage renal disease) on dialysis (HCC)  Lab Results   Component Value Date    EGFR 9 (L) 10/24/2024    EGFR 5 09/11/2024    EGFR 10 (L) 08/03/2024    CREATININE 7.44 (H) 10/24/2024    CREATININE 9.71 (H) 09/11/2024    CREATININE 6.56 (H) 08/03/2024   Patient now on hemodialysis, on Mondays Wednesdays and Fridays.  He had recent placement of AV fistula.  Reviewed the association of sleep disorders and PLM's in patients with ESRD.  Will proceed with sleep study as above.  If extensive PLM's exist can consider treatment with auto CPAP as above.         Morbid obesity (HCC)  Obesity - We reviewed the association of obesity on obstructive sleep apnea and sleep disordered breathing. Encouraged patient to follow healthy diet, regular exercise regimen, and pursue weight loss to manage symptoms.          History of Present Illness     I have personally reviewed the MA's review of systems and made changes as necessary.    Objective     Pulse 76   Ht 5' 7\" (1.702 m)   Wt (!) 168 kg (370 lb)   SpO2 96%   BMI 57.95 kg/m²     [facility] New Patient Evaluation    Mr. Frankel is a a 44 y.o. male with a PMH of CAD status post PCI, former tobacco use, hypertension, hyperlipidemia, ESRD on hemodialysis (M/W/F), Morbid Obesity (BMI 58), who presents as a new patient for evaluation of sleep disordered breathing.    I reviewed the recent office note from 10/11/2024 with Dr. Houston from vascular surgery.    I reviewed the cardiology office note from " "8/29/2024 with .    I reviewed the primary care office note from 8/7/2024 with Dr. Duvall.    History of Presenting Illness:    The patient snores. There are no choking and gasping episodes. There are witnessed apneas. Typically no episode(s) of nocturia occur per night (now on HD). The patient sleeps on his side and back. He sleeps with one memory foam pillow. There are reports of sleepwalking and sleeptalking as a child, but not as as an adult.    The patient's [place] sleepiness scale score is 6/24 (<=10 is normal). He has been sleepy while driving. He has not had a fall-asleep motor vehicle accident. There are no reports of hypnagogic hallucinations, cataplexy or sleep paralysis.    In terms of the patient's sleep/wake cycle symptoms:  Bedtime: 10:00pm.   SOL: Brief (<15 minutes)  Nocturnal awakenings: 3-4x Previously for Nocturia, Pain and Cramps  Wakeup time: 8:00am  Naps: Denied    Total sleep time estimate: Approximately 7 hours hours.     Weekends are similar to week days.    He has not tried any medications for poor sleep in the past.    His legs do bother him on trying to initiate sleep.    Regular nasal congestion. No AM headaches.     Past Medical History:   Diagnosis Date    Acute hypoxic respiratory failure ([facility]) 10/07/2023    Arthritis     Breathing difficulty     Cellulitis 10/07/2023    Coronary artery disease     Diabetes mellitus ([facility])     Diabetic neuropathy ([facility]) 05/28/2021    Heart disease     CAD   s/p ptca with 1 stent 2012    Hidradenitis suppurativa     groin    History of transfusion     Hyperlipidemia     Hypertension     MI (myocardial infarction) ([facility])     \" silent \" M.I. in the past    Morbid obesity with BMI of 50.0-59.9, adult ([facility])     Nicotine dependence     Obesity     PE (pulmonary thromboembolism) ([facility])     small on left side, takes coumadin    Pilonidal cyst     Type 1 non-ST elevation myocardial infarction (NSTEMI) ([facility]) 11/29/2020      No prior upper airway " surgeries.     Past Surgical History:   Procedure Laterality Date    CARDIAC SURGERY      CORONARY ANGIOPLASTY WITH STENT PLACEMENT      x2    INCISION AND DRAINAGE OF WOUND N/A 01/24/2017    Procedure: INCISION AND DRAINAGE (I&D) BUTTOCK, PILONIDAL CYST;  Surgeon: Simba Adhikari MD;  Location: [facility];  Service:     IR BIOPSY BONE MARROW  06/12/2024    IR BIOPSY KIDNEY RANDOM  07/12/2024    IR BIOPSY KIDNEY RANDOM  07/19/2024    IR TEMPORARY DIALYSIS CATHETER PLACEMENT  06/06/2024    IR TUNNELED CENTRAL LINE PLACEMENT  06/14/2024    LEG SURGERY Left     I&D of left leg 2012    HI ARTERIOVENOUS ANASTOMOSIS OPEN DIRECT Left 9/26/2024    Procedure: left arm radio-cephalic AVF creation;  Surgeon: Scout James DO;  Location: [facility];  Service: Vascular    HI DEBRIDEMENT SUBCUTANEOUS TISSUE 1ST 20 SQ CM/< Left 07/06/2021    Procedure: DEBRIDEMENT WOUND (WASH OUT);  Surgeon: Sudheer Mcfarlane MD;  Location: [facility];  Service: General    HI EXC B9 LESION MRGN XCP SK TG T/A/L >4.0 CM Left 04/06/2021    Procedure: EXCISION THIGH MASS;  Surgeon: Sudheer Mcfarlane MD;  [facility];  Service: General    HI EXCISION H/P/P/U COMPLEX REPAIR Left 07/06/2021    Procedure: EXCISION PERINEAL ABSCESS LEFT THIGH;  Surgeon: Sudheer Mcfarlane MD;  [facility];  Service: General    HI NEGATIVE PRESSURE WOUND THERAPY DME <= 50 SQ CM Left 04/06/2021    Procedure: APPLICATION VAC DRESSING THIGH;  Surgeon: Sudheer Mcfarlane MD;  [facility];  Service: General    WOUND DEBRIDEMENT Left 04/17/2021    Procedure: DEBRIDEMENT WOUND AND DRESSING CHANGE (WASH OUT);  Surgeon: Mahin Traore DO;  [facility];  Service: General    WOUND DEBRIDEMENT Left 04/22/2021    Procedure: DEBRIDEMENT LOWER EXTREMITY (WASH OUT), left groin and thigh;  Surgeon: Sudheer Mcfarlane MD;  [facility];  Service: General    WOUND DEBRIDEMENT Left 05/26/2021    Procedure: DEBRIDEMENT LOWER EXTREMITY (WASH OUT);  Surgeon: Zak Castanon DO;   [facility];  Service: General    WOUND DEBRIDEMENT Left 05/28/2021    Procedure: Washout left thigh wound and closure;  Surgeon: Amor Jaramillo DO;  [facility];  Service: General        Allergies   Allergen Reactions    Keflex [Cephalexin] Facial Swelling and Lip Swelling    Amoxicillin Hives     childhood        Current Outpatient Medications on File Prior to Visit   Medication Sig Dispense Refill    aspirin (ECOTRIN LOW STRENGTH) 81 mg EC tablet Take 1 tablet (81 mg total) by mouth daily 90 tablet 2    atorvastatin (LIPITOR) 80 mg tablet Take 1 tablet (80 mg total) by mouth daily 90 tablet 3    b complex-vitamin C-folic acid (RENAL) 1 mg Take 1 capsule by mouth daily with dinner 30 capsule 8    calcium acetate (PHOSLO) capsule Take 2 capsules (1,334 mg total) by mouth 3 (three) times a day 540 capsule 1    carvedilol (COREG) 6.25 mg tablet Take 1 tablet (6.25 mg total) by mouth 2 (two) times a day with meals 180 tablet 1    Cholecalciferol (VITAMIN D3) 1,000 units tablet Take 2 tablets (2,000 Units total) by mouth daily 60 tablet 5    ferrous sulfate 324 MG TBEC Take 324 mg by mouth      isosorbide mononitrate (IMDUR) 60 mg 24 hr tablet Take 1 tablet (60 mg total) by mouth daily 90 tablet 2    metolazone (ZAROXOLYN) 10 mg tablet Take 1 tablet (10 mg total) by mouth 4 (four) times a week 50 tablet 1    NIFEdipine (PROCARDIA XL) 30 mg 24 hr tablet Take 1 tablet (30 mg total) by mouth daily at bedtime 90 tablet 1    sevelamer (RENAGEL) 800 mg tablet Take 1 tablet (800 mg total) by mouth 3 (three) times a day with meals 540 tablet 1    torsemide (DEMADEX) 100 mg tablet Take 1 tablet (100 mg total) by mouth 4 (four) times a week 50 tablet 1    warfarin (COUMADIN) 10 mg tablet Take 1 tablet (10 mg total) by mouth daily 30 tablet 5     No current facility-administered medications on file prior to visit.       Family History: His family history includes Diabetes in his father; Heart disease in his  father and mother; Hypertension in his mother.    Reports Aunt with history of insomnia.     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    Review of Systems: On review of systems, the patient reports regular nasal sinus congestion.  He denied regular a.m. headaches.  No current chest pain or chest pressure.    Physical Examination:  Gen: 20 Inch Neck, No acute distress, not visibly anxious, speaking comfortably  H&N: MM IV; mild retrognathia; macroglossia; no visible thyromegaly  Neuro: Alert and oriented x3, interactive  Psych: Pleasant, normal affect  Skin: No visible rashes  Respiratory: No accessory muscle use, breathing comfortably  Cardiac: visible 1+ edema of extremities  Abdomen: Soft, NT, distended  Musculoskeletal: ROM Intact of Extremities    Study Results:  I reviewed the following labs:    Lab Results   Component Value Date    FERRITIN 69 06/04/2024       Lab Results   Component Value Date    WBC 11.92 (H) 09/11/2024    HGB 8.8 (L) 09/11/2024    HCT 26.5 (L) 09/11/2024    MCV 90 09/11/2024     09/11/2024       This SmartLink has not been configured with any valid records.       Lab Results   Component Value Date    SODIUM 138 10/24/2024    K 4.9 10/24/2024    CL 97 (L) 10/24/2024    CO2 27 10/24/2024    BUN 49 (H) 10/24/2024    CREATININE 7.44 (H) 10/24/2024    GLUC 89 10/24/2024    CALCIUM 8.1 (L) 10/24/2024       This SmartLink has not been configured with any valid records.       Lab Results   Component Value Date    XJY6XNDMBYBA 2.784 03/30/2022        Results/Data:  I have reviewed the results and images from the 7/15/2024 chest  x-ray.    I have reviewed the results and report from the 6/4/2024 transthoracic echocardiogram:Left Ventricle: Left ventricular cavity size is normal. Wall thickness is moderately increased. There is moderate concentric hypertrophy. The left ventricular ejection fraction is 58% by visual estimation. Systolic function is normal. Wall motion is normal. Diastolic function is mildly abnormal, consistent with grade I (abnormal) relaxation. Left atrial filling pressure is normal.     I answered the patient's questions to the best of my ability. Follow-up will be in approximately 6 to 8 weeks following sleep study.  Longitudinal follow-up for concern of sleep disordered breathing.      Joaquin Camara MD  Sleep Medicine and Internal Medicine  [facility]  11/12/24

## 2024-11-13 ENCOUNTER — PREP FOR PROCEDURE (OUTPATIENT)
Dept: VASCULAR SURGERY | Facility: CLINIC | Age: 44
End: 2024-11-13

## 2024-11-13 ENCOUNTER — TELEPHONE (OUTPATIENT)
Dept: VASCULAR SURGERY | Facility: CLINIC | Age: 44
End: 2024-11-13

## 2024-11-13 ENCOUNTER — OFFICE VISIT (OUTPATIENT)
Dept: VASCULAR SURGERY | Facility: CLINIC | Age: 44
End: 2024-11-13
Payer: MEDICARE

## 2024-11-13 VITALS
DIASTOLIC BLOOD PRESSURE: 80 MMHG | BODY MASS INDEX: 49.44 KG/M2 | SYSTOLIC BLOOD PRESSURE: 136 MMHG | WEIGHT: 315 LBS | HEART RATE: 76 BPM | HEIGHT: 67 IN

## 2024-11-13 DIAGNOSIS — I89.0 LYMPHEDEMA: ICD-10-CM

## 2024-11-13 DIAGNOSIS — Z99.2 ESRD (END STAGE RENAL DISEASE) ON DIALYSIS (HCC): Primary | ICD-10-CM

## 2024-11-13 DIAGNOSIS — N18.6 ESRD (END STAGE RENAL DISEASE) ON DIALYSIS (HCC): Primary | ICD-10-CM

## 2024-11-13 PROCEDURE — 99215 OFFICE O/P EST HI 40 MIN: CPT | Performed by: SURGERY

## 2024-11-13 RX ORDER — CHLORHEXIDINE GLUCONATE ORAL RINSE 1.2 MG/ML
15 SOLUTION DENTAL ONCE
Status: CANCELLED | OUTPATIENT
Start: 2024-11-13 | End: 2024-11-13

## 2024-11-13 NOTE — TELEPHONE ENCOUNTER
Verified patient's insurance   CONFIRMED - Patient's insurance is Medicare  Is patient requesting a call when authorization has been obtained? Patient did not request a call.    Surgery Date: 11/21/24  Primary Surgeon: FABIO // Scout James (NPI: [id number])  Assisting Surgeon: Not Applicable (N/A)  Facility: [facility] (Tax: [id number] / NPI: [id number])  Inpatient / Outpatient: Outpatient  Level: 4    Clearance Received: No clearance ordered.  Consent Received: Yes, scanned into Epic on 11/13/24.  Medication Hold / Last Dose:  Hold Coumadin 5 days prior, last dose 11/15/24  IR Notified: Not Applicable (N/A)  Rep. Notified: Not Applicable (N/A)  Equipment Needs: Not Applicable (N/A)  Vas Lab Requested: Not Applicable (N/A)  Patient Contacted: 11/13/24    Diagnosis: N18.6, Z99.2  Procedure/ CPT Code(s): (AV) Arteriovenous Fistula // CPT: 33762    For varicose vein related procedures:   Last LEVDR: Not Applicable (N/A)  CEAP Classification: Not Applicable (N/A)  VCSS: Not Applicable (N/A)    Post Operative Date/ Time: 12/13/24 , 8:00am  [place] ([place]) with Scout James (NPI: [id number])     *Please review medication hold(s), PATs, and check H&P with patient.*  PATIENT WAS MAILED SURGERY/SHOWERING/DISCHARGE/COVID INSTRUCTIONS AFTER REVIEWING WITH THEM VIA PHONE CALL.

## 2024-11-13 NOTE — ASSESSMENT & PLAN NOTE
Lab Results   Component Value Date    EGFR 9 (L) 10/24/2024    EGFR 5 09/11/2024    EGFR 10 (L) 08/03/2024    CREATININE 7.44 (H) 10/24/2024    CREATININE 9.71 (H) 09/11/2024    CREATININE 6.56 (H) 08/03/2024     43-year-old male presents for follow-up for dialysis access.  He is right-hand dominant and been on dialysis via a PermCath since June.  He saw me a few months ago and had a left proximal radiocephalic fistula created at the end of September.  Unfortunately this failed to mature and he subsequently had a duplex that confirmed lack of maturation.  At the time of his initial procedure the intraoperative vein mapping did demonstrate an adequate caliber upper arm basilic vein I discussed options with him at this point I did recommend proceeding with a left upper arm brachiobasilic fistula creation which would need to be done in 2 stages.  I did also discussed with him in the event that the basilic vein is inadequate in the left arm options at that point would be to proceed with either a left upper arm AV graft or a right upper extremity dominant arm autogenous access.  Given his young age I did recommend that he consider the right upper extremity autogenous access which he is agreeable to we will plan at this point to move forward with the left upper extremity brachiobasilic fistula creation and if this is in adequate intraoperatively we will reschedule him for the right arm.  Risks benefits and alternative therapies were discussed with him in detail he consented to proceed he will need to hold his Coumadin for 5 days prior to the procedure.      Orders:    Case request operating room: left arm AVF creation; Standing

## 2024-11-13 NOTE — TELEPHONE ENCOUNTER
REMINDER: Under Reason For Call, comments MUST be formatted as:   (Surgeon's Initials) / (Procedure)      Special Instructions / FYI: Level 4.  No Clearances    Consent: I certify that patient has signed, printed, timed, and dated their surgery consent.  I certify that the patient's LEGAL NAME and DATE OF BIRTH are written in the upper left corner on BOTH sides of the consent.  I certify that BOTH sides of the completed surgery consent have been scanned into the patient's Epic chart by myself on 11/13/2024.  Yes, I have LABELED the consent in Epic as Consent for Vascular Procedure.     For Surgical Clearances     Levels   1-3   ROUTE this encounter to [facility] Clearance Pool (AND)   [facility] Surgery Coordinator Pool     Level   4   ROUTE this encounter to [facility] Surgery Coordinator Pool       HYDRATION CLEARANCES   ONLY ROUTE TO  [facility] Clearance Pool       Yes, I have ROUTED this encounter to [facility] Surgery Coordinator and/or [facility] Clearance Pool.

## 2024-11-13 NOTE — TELEPHONE ENCOUNTER
Operative Scheduling Information:     Hospital:  [facility]     Physician:  Erika     Surgery: Left arm AVF creation     Urgency:  Standard     Level:  Level 4: Outpatients to be scheduled for screening procedures and elective surgery that can be delayed for longer than one month without reasonable expectation of detriment to patient.     Case Length:  Normal     Post-op Bed:  Outpatient     OR Table:  Standard     Equipment Needs:  None     Medication Instructions:  Coumadin:  Hold for 7 days prior to procedure     Hydration:  No     Contrast Allergy:  no

## 2024-11-14 NOTE — PROGRESS NOTES
Progress Note - Cardiology Office  [facility]    Joaquin Frankel 44 y.o. male MRN: [id number]  : 1980  Encounter: [id number]      ASSESSMENT:  Pulmonary embolism, subsegmental right lower lobe  CTA chest 2024  Subsegmental right lower lobe pulmonary embolism.  No CT evidence of right heart strain.  Anticoagulated with warfarin, managed by PCP Dr. Mcfarland     ESRD on HD  Perioperative cardiac risk assessment for left upper arm AVF creation to general anesthesia on 2024     Coronary artery disease,   S/p NSTEMI 2020,   s/p PCI of RCA on 2020,  History of PCI of LCX in      TTE:  EF 58%, moderate LVH, G1 DD,  Moderate biatrial enlargement, mild tricuspid regurgitation, RSVP 58 mmHg     Hypertension  BP is controlled with meds and dialysis     Hyperlipidemia:  2024 , , HDL 28  On atorvastatin 80 mg  > Advised on low-cholesterol diet and weight loss     Type 2 diabetes mellitus:     History of Left groin cellulitis/mass:  S/p excision of left groin mass with skin grafting     Chronic Tobacco Abuse  Quit Smoking about 3 years ago     Morbid obesity: BMI 58.11     Chronic LLE lymphedema  Followed by vascular surgery     Anemia of chronic disease  Monoclonal gammopathy  S/p bone marrow biopsy           RECOMMENDATIONS:   Patient has intermediate perioperative cardiac risk for above planned vascular surgery.  There is no cardiac contraindication to the surgery.  His cardiac status appears to be optimized.  He is hemodynamically stable and his EKG is normal.    Patient already has been informed to hold Coumadin for 5 days preop.  If needed can also hold aspirin for 5 days before the surgery and then resume once cleared by vascular surgery postop    Continue cardiac medications including aspirin, atorvastatin, Coreg, Imdur, and nifedipine.  Diuretic is being managed by nephrology  Warfarin is being managed by PCP      Please call [phone] if  "any questions.    HPI :     Joaquin Frankel is a 44 y.o. year old male who came for perioperative cardiac risk assessment prior to undergoing vascular surgery, creation of left upper arm AVF for dialysis.  Patient has end-stage renal disease and her previous fistula malfunctioned.  He also has morbid obesity, CAD, history of NSTEMI and PCI in 2010 and 2020.  He denies any chest pain or unusual dyspnea.  He did have some infection of his leg and went to the ED and is on oral antibiotics.  He is arranging for vascular follow-up    REVIEW OF SYSTEMS:  Denies any cardiac symptoms.  Denies chest pain, unusual dyspnea, palpitations or syncope      Historical Information   Past Medical History:   Diagnosis Date    Acute hypoxic respiratory failure (HCC) 10/07/2023    Arthritis     Breathing difficulty     Cellulitis 10/07/2023    Chronic kidney disease     end stage renal disease    Coronary artery disease     Diabetes mellitus ([facility])     Diabetic neuropathy ([facility]) 05/28/2021    Heart disease     CAD   s/p ptca with 1 stent 2012    Hidradenitis suppurativa     groin    History of transfusion     Hyperlipidemia     Hypertension     MI (myocardial infarction) ([facility])     \" silent \" M.I. in the past    Morbid obesity with BMI of 50.0-59.9, adult ([facility])     Nicotine dependence     Obesity     PE (pulmonary thromboembolism) ([facility])     small on left side, takes coumadin    Pilonidal cyst     Type 1 non-ST elevation myocardial infarction (NSTEMI) ([facility]) 11/29/2020     Past Surgical History:   Procedure Laterality Date    CARDIAC SURGERY      CORONARY ANGIOPLASTY WITH STENT PLACEMENT      x2    INCISION AND DRAINAGE OF WOUND N/A 01/24/2017    Procedure: INCISION AND DRAINAGE (I&D) BUTTOCK, PILONIDAL CYST;  Surgeon: Simba Adhikari MD;  Location: [facility] OR;  Service:     IR BIOPSY BONE MARROW  06/12/2024    IR BIOPSY KIDNEY RANDOM  07/12/2024    IR BIOPSY KIDNEY RANDOM  07/19/2024    IR TEMPORARY DIALYSIS CATHETER PLACEMENT  06/06/2024    IR " TUNNELED CENTRAL LINE PLACEMENT  06/14/2024    LEG SURGERY Left     I&D of left leg 2012    DC ARTERIOVENOUS ANASTOMOSIS OPEN DIRECT Left 09/26/2024    Procedure: left arm radio-cephalic AVF creation;  Surgeon: Scout James DO;  Location: [facility];  Service: Vascular    DC DEBRIDEMENT SUBCUTANEOUS TISSUE 1ST 20 SQ CM/< Left 07/06/2021    Procedure: DEBRIDEMENT WOUND (WASH OUT);  Surgeon: Sudheer Mcfarlane MD;  Location: [facility];  Service: General    DC EXC B9 LESION MRGN XCP SK TG T/A/L >4.0 CM Left 04/06/2021    Procedure: EXCISION THIGH MASS;  Surgeon: Sudheer Mcfarlane MD;  Location: [facility];  Service: General    DC EXCISION H/P/P/U COMPLEX REPAIR Left 07/06/2021    Procedure: EXCISION PERINEAL ABSCESS LEFT THIGH;  Surgeon: Sudheer Mcfarlane MD;  [facility];  Service: General    DC NEGATIVE PRESSURE WOUND THERAPY DME <= 50 SQ CM Left 04/06/2021    Procedure: APPLICATION VAC DRESSING THIGH;  Surgeon: Sudheer Mcfarlane MD;  [facility];  Service: General    WOUND DEBRIDEMENT Left 04/17/2021    Procedure: DEBRIDEMENT WOUND AND DRESSING CHANGE (WASH OUT);  Surgeon: Mahin Traore DO;  [facility];  Service: General    WOUND DEBRIDEMENT Left 04/22/2021    Procedure: DEBRIDEMENT LOWER EXTREMITY (WASH OUT), left groin and thigh;  Surgeon: Sudheer Mcfarlane MD;  [facility];  Service: General    WOUND DEBRIDEMENT Left 05/26/2021    Procedure: DEBRIDEMENT LOWER EXTREMITY (WASH OUT);  Surgeon: Zak Castanon DO;  [facility];  Service: General    WOUND DEBRIDEMENT Left 05/28/2021    Procedure: Washout left thigh wound and closure;  Surgeon: Amor Jaramillo DO;  [facility];  Service: General     Social History     Substance and Sexual Activity   Alcohol Use Not Currently    Comment: rarely     Social History     Substance and Sexual Activity   Drug Use No     Social History     Tobacco Use   Smoking Status Former    Current packs/day: 0.00    Average packs/day: 1.5 packs/day for  20.0 years (29.9 ttl pk-yrs)    Types: Cigarettes    Start date: 01/2021    Quit date: 03/2021    Years since quitting: 3.7    Passive exposure: Past   Smokeless Tobacco Never     Family History:   Family History   Problem Relation Age of Onset    Heart disease Father     Diabetes Father     Hypertension Mother     Heart disease Mother        Meds/Allergies     Allergies   Allergen Reactions    Keflex [Cephalexin] Facial Swelling and Lip Swelling    Amoxicillin Hives     childhood       Current Outpatient Medications:     aspirin (ECOTRIN LOW STRENGTH) 81 mg EC tablet, Take 1 tablet (81 mg total) by mouth daily, Disp: 90 tablet, Rfl: 2    atorvastatin (LIPITOR) 80 mg tablet, Take 1 tablet (80 mg total) by mouth daily, Disp: 90 tablet, Rfl: 3    b complex-vitamin C-folic acid (RENAL) 1 mg, Take 1 capsule by mouth daily with dinner, Disp: 30 capsule, Rfl: 8    calcium acetate (PHOSLO) capsule, Take 2 capsules (1,334 mg total) by mouth 3 (three) times a day, Disp: 540 capsule, Rfl: 1    carvedilol (COREG) 6.25 mg tablet, Take 1 tablet (6.25 mg total) by mouth 2 (two) times a day with meals, Disp: 180 tablet, Rfl: 1    Cholecalciferol (VITAMIN D3) 1,000 units tablet, Take 2 tablets (2,000 Units total) by mouth daily, Disp: 60 tablet, Rfl: 5    doxycycline hyclate (VIBRAMYCIN) 100 mg capsule, Take 1 capsule (100 mg total) by mouth 2 (two) times a day for 7 days, Disp: 14 capsule, Rfl: 0    HYDROcodone-acetaminophen (Norco) 5-325 mg per tablet, Take 1 tablet by mouth every 6 (six) hours as needed for pain for up to 10 doses Max Daily Amount: 4 tablets, Disp: 10 tablet, Rfl: 0    isosorbide mononitrate (IMDUR) 60 mg 24 hr tablet, Take 1 tablet (60 mg total) by mouth daily, Disp: 90 tablet, Rfl: 2    metolazone (ZAROXOLYN) 10 mg tablet, Take 1 tablet (10 mg total) by mouth 4 (four) times a week, Disp: 50 tablet, Rfl: 1    NIFEdipine (PROCARDIA XL) 30 mg 24 hr tablet, Take 1 tablet (30 mg total) by mouth daily at bedtime,  "Disp: 90 tablet, Rfl: 1    sevelamer (RENAGEL) 800 mg tablet, Take 1 tablet (800 mg total) by mouth 3 (three) times a day with meals, Disp: 540 tablet, Rfl: 1    torsemide (DEMADEX) 100 mg tablet, Take 1 tablet (100 mg total) by mouth 4 (four) times a week, Disp: 50 tablet, Rfl: 1    warfarin (COUMADIN) 10 mg tablet, Take 1.5 tablets (15 mg total) by mouth daily, Disp: 135 tablet, Rfl: 5    ferrous sulfate 324 MG TBEC, Take 324 mg by mouth (Patient not taking: Reported on 11/18/2024), Disp: , Rfl:     Vitals: Blood pressure 108/52, pulse 60, height 5' 7\" (1.702 m), weight (!) 168 kg (371 lb), SpO2 95%.    Body mass index is 58.11 kg/m².  Vitals:    11/18/24 1517   Weight: (!) 168 kg (371 lb)     BP Readings from Last 3 Encounters:   11/18/24 108/52   11/16/24 107/53   11/13/24 136/80       Physical Exam:  Physical Exam    Neurologic:  Alert & oriented x 3, no new focal deficits, Not in any acute distress,  Constitutional:  Well developed, well nourished, non-toxic appearance   Eyes: Morbidly obese  HENT:  Atraumatic, oropharynx moist, Neck- normal range of motion, no tenderness,  Neck supple, No JVP, No LNP   Respiratory:  Bilateral air entry, mostly clear to auscultation  Cardiovascular: S1-S2 regular with a I/VI systolic murmur   GI:  Soft, nondistended, normal bowel sounds, nontender, no hepatosplenomegaly appreciated.  Musculoskeletal:  No tenderness, no deformities.   Skin:  Well hydrated, no rash   Extremities: Bilateral lower extremity lymphedema, left > right        Diagnostic Studies Review Cardio:      EKG: Normal sinus rhythm, heart rate 75/min    Cardiac testing:   Results for orders placed during the hospital encounter of 04/06/21    Echo complete with contrast if indicated    Narrative  [address] [id number] [phone]    Transthoracic Echocardiogram  2D, M-mode, Doppler, and Color Doppler    Study date:  06-Apr-2021    Patient: MORELIA ROSEN" SHILPA  MR number: [id number]  Account number: [id number]  : 1980  Age: 40 years  Gender: Male  Status: Inpatient  Location: Bedside  Height: 66 in  Weight: 369.2 lb  BP: 100/ 58 mmHg    Indications: Assess left ventricular function.    Diagnoses: J96.91 - Respiratory failure, unspecified with hypoxia    Sonographer:  SIMEON Barnes  Primary Physician:  Heydi Norman MD  Referring Physician:  Lauryn Ullrich,DO  Group:  [facility]  Cardiology Fellow:  Simba Rm MD  Interpreting Physician:  Irving Arnett MD    SUMMARY    PROCEDURE INFORMATION:  This was a technically difficult study.  Intravenous contrast ( 0.6 ml Definity/NSS) was administered.    LEFT VENTRICLE:  Systolic function was normal. Ejection fraction was estimated to be 65 %.  There were no regional wall motion abnormalities.    RIGHT VENTRICLE:  The size was normal.  Systolic function was normal.    HISTORY: PRIOR HISTORY: Respiratory failure with hypoxia, CAD s/p stent, Hyperlipidemia    PROCEDURE: The procedure was performed at the bedside. This was a routine study. The transthoracic approach was used. The study included complete 2D imaging, M-mode, complete spectral Doppler, and color Doppler. The heart rate was 66 bpm,  at the start of the study. Images were obtained from the parasternal, apical, subcostal, and suprasternal notch acoustic windows. Intravenous contrast ( 0.6 ml Definity/NSS) was administered. Echocardiographic views were limited due to  decreased penetration and patient on mechanical ventilator. This was a technically difficult study.    LEFT VENTRICLE: Size was normal. Systolic function was normal. Ejection fraction was estimated to be 65 %. There were no regional wall motion abnormalities. Wall thickness was normal. There was no evidence of concentric hypertrophy.  DOPPLER: The transmitral flow pattern was normal. The study was not technically sufficient to allow evaluation of  LV diastolic function.    RIGHT VENTRICLE: The size was normal. Systolic function was normal.    LEFT ATRIUM: Size was normal.    RIGHT ATRIUM: Size was normal.    MITRAL VALVE: Valve structure was normal. There was normal leaflet separation. DOPPLER: The transmitral velocity was within the normal range. There was no evidence for stenosis. There was no significant regurgitation.    AORTIC VALVE: The valve was probably trileaflet. Leaflets exhibited normal thickness and normal cuspal separation. DOPPLER: Transaortic velocity was within the normal range. There was no evidence for stenosis. There was no regurgitation.    TRICUSPID VALVE: The valve structure was normal. There was normal leaflet separation. DOPPLER: The transtricuspid velocity was within the normal range. There was no evidence for stenosis. There was trace regurgitation. The tricuspid jet  envelope definition was inadequate for estimation of RV systolic pressure.    PULMONIC VALVE: Leaflets exhibited normal thickness, no calcification, and normal cuspal separation. DOPPLER: The transpulmonic velocity was within the normal range. There was no evidence for stenosis. There was no significant  regurgitation.    PERICARDIUM: There was no pericardial effusion.    AORTA: The root exhibited normal size.    SYSTEMIC VEINS: IVC: The inferior vena cava was dilated. Respirophasic changes in dimension were absent.    SYSTEM MEASUREMENT TABLES    2D  %FS: 40.76 %  Ao Diam: 3.12 cm  EDV(Teich): 125.68 ml  EF(Teich): 71.22 %  ESV(Teich): 36.17 ml  IVSd: 0.96 cm  LA Area: 18.82 cm2  LA Diam: 4.09 cm  LVEDV MOD A4C: 139.97 ml  LVEF MOD A4C: 73.53 %  LVESV MOD A4C: 37.05 ml  LVIDd: 5.13 cm  LVIDs: 3.04 cm  LVLd A4C: 8.89 cm  LVLs A4C: 6.99 cm  LVPWd: 0.97 cm  RA Area: 19.01 cm2  RVIDd: 3.58 cm  SV MOD A4C: 102.92 ml  SV(Teich): 89.51 ml    MM  TAPSE: 2.34 cm    PW  E' Sept: 0.07 m/s  E/E' Sept: 7.73  MV A Flavio: 0.38 m/s  MV Dec [place]: 2.89 m/s2  MV DecT: 197.93 ms  MV E  Flavio: 0.57 m/s  MV E/A Ratio: 1.5  MV PHT: 57.4 ms  MVA By PHT: 3.83 cm2    [facility] Accredited Echocardiography Laboratory    Prepared and electronically signed by    Irving Arnett MD  Signed 2021 10:12:03    No results found for this or any previous visit.    Results for orders placed during the hospital encounter of 24    Echo complete w/ contrast if indicated    Interpretation Summary    Left Ventricle: Left ventricular cavity size is normal. Wall thickness is moderately increased. There is moderate concentric hypertrophy. The left ventricular ejection fraction is 58% by visual estimation. Systolic function is normal. Wall motion is normal. Diastolic function is mildly abnormal, consistent with grade I (abnormal) relaxation.  Left atrial filling pressure is normal.    Left Atrium: The atrium is mildly dilated.    Right Atrium: The atrium is mildly dilated.    Mitral Valve: There is mild regurgitation.    Tricuspid Valve: There is mild regurgitation. The right ventricular systolic pressure is moderately elevated. The estimated right ventricular systolic pressure is 58.00 mmHg.    Pulmonic Valve: There is mild regurgitation.    Compared to previous exam, there is no significant change.    No results found for this or any previous visit.    Results for orders placed during the hospital encounter of 20    Cardiac catheterization    [address] [address] [phone]    ([place])    Invasive Cardiovascular Lab Complete Report    Patient: MORELIA MASSEY  MR number: [id number]  Account number: [id number]  Study date: 2020  Gender: Male  : 1980  Height: 68.9 in  Weight: 374 lb  BSA: 2.69 mï¾²    Allergies: AMOXICILLIN    Diagnostic Cardiologist:  Jhon Mejias DO  Interventional Cardiologist:  Jhon Mejias DO    SUMMARY    CORONARY CIRCULATION:  There was 1-vessel coronary  artery disease.  Mid RCA: There was a 80 % stenosis.    1ST LESION INTERVENTIONS:  A balloon angioplasty with stent procedure was performed on the 80 % lesion in the mid RCA. Following intervention there was a 0 % residual stenosis.  A Resolute [place] Rx 3.0 x 12mm drug-eluting stent was placed across the lesion and deployed at a maximum inflation pressure of 14 sarah.    INDICATIONS:  --  Possible CAD: unstable angina.    PROCEDURES PERFORMED    --  Left coronary angiography.  --  Right coronary angiography.  --  Inpatient.  --  Mod Sedation Same Physician Initial 15min.  --  Mod Sedation Same Physician Add 15min.  --  Coronary Catheterization (w/o [facility]).  --  Mod Sedation Same Physician Add 15min.  --  Coronary Drug Eluting Stent w/PTCA.  --  Intervention on mid RCA: balloon angioplasty, stent.    PROCEDURE: The risks and alternatives of the procedures and conscious sedation were explained to the patient and informed consent was obtained. The patient was brought to the cath lab and placed on the table. The planned puncture sites  were prepped and draped in the usual sterile fashion.    --  Right radial artery access. After performing an Ravi's test to verify adequate ulnar artery supply to the hand, the radial site was prepped. The puncture site was infiltrated with local anesthetic. The vessel was accessed using the  modified Seldinger technique, a wire was advanced into the vessel, and a sheath was advanced over the wire into the vessel.    --  Left coronary artery angiography. A catheter was advanced over a guidewire into the aorta and positioned in the left coronary artery ostium under fluoroscopic guidance. Angiography was performed.    --  Right coronary artery angiography. A catheter was advanced over a guidewire into the aorta and positioned in the right coronary artery ostium under fluoroscopic guidance. Angiography was performed.    --  Inpatient.    --  Mod Sedation Same Physician Initial 15min.    --  Mod  Sedation Same Physician Add 15min.    --  Coronary Catheterization (w/o [facility]).    --  Mod Sedation Same Physician Add 15min.    LESION INTERVENTION: A balloon angioplasty with stent procedure was performed on the 80 % lesion in the mid RCA. Following intervention there was a 0 % residual stenosis. There was HERNAN 3 flow before the procedure and HERNAN 3 flow after the  procedure.    --  Vessel setup was performed. A 6Fr. Launcher AR1 guiding catheter was used to cannulate the vessel.    --  Vessel setup was performed. A Runthrough NS 180cm wire was used to cross the lesion.    --  Balloon angioplasty was performed, using a Trek Rx 2.5 x 12mm balloon, with 2 inflations and a maximum inflation pressure of 12 sarah.    --  A Resolute [place] Rx 3.0 x 12mm drug-eluting stent was placed across the lesion and deployed at a maximum inflation pressure of 14 sarah.    INTERVENTIONS:  --  Coronary Drug Eluting Stent w/PTCA.    PROCEDURE COMPLETION: The patient tolerated the procedure well and was discharged from the cath lab. TIMING: Test started at 11:08. Test concluded at 11:49. HEMOSTASIS: The sheath was removed. The site was compressed with a Hemoband  device. Hemostasis was obtained. MEDICATIONS GIVEN: Versed (2mg/2ml), 2 mg, IV, at 11:02. Fentanyl (1OOmcg/2 ml), 50 mcg, IV, at 11:02. 1% Lidocaine, 1 ml, subcutaneously, at 11:09. Nitroglycerin (200mcg/ml), 200 mcg, at 11:13. Verapamil  (5mg/2ml), 2.5 mg, IV, at 11:14. Heparin 1000 units/ml, 4,000 units, IV, at 11:14. Fentanyl (1OOmcg/2 ml), 50 mcg, IV, at 11:14. Versed (2mg/2ml), 1 mg, IV, at 11:14. Ticagrelor, 180 mg, PO, at 11:28. Heparin 1000 units/ml, 8,000 units,  IV, at 11:29. Versed (2mg/2ml), 1 mg, IV, at 11:39. Fentanyl (1OOmcg/2 ml), 25 mcg, IV, at 11:39. Fentanyl (1OOmcg/2 ml), 25 mcg, IV, at 11:41. Heparin 1000 units/ml, 2,000 units, IV, at 11:49. CONTRAST GIVEN: 130 ml Omnipaque (350 mg I  /ml). RADIATION EXPOSURE: Fluoroscopy time: 9.95 min.    CORONARY VESSELS:   --   The coronary circulation is right dominant.  --  There was 1-vessel coronary artery disease.  --  Left main: Angiography showed minor luminal irregularities.  --  LAD: Angiography showed minor luminal irregularities.  --  Mid circumflex: There was a 30 % stenosis.  --  RCA: The vessel was excessively tortuous.  --  Proximal RCA: There was a 30 % stenosis.  --  Mid RCA: There was a 80 % stenosis.  --  Distal RCA: There was a 40 % stenosis.    IMPRESSIONS:  1V CAD/RCA with SABINE placement mid RCA    RECOMMENDATIONS  weight loss, smoking cessation, DM control, dapt x 1 year, gdmt cad    DISPOSITION:  The patient left the catheterization laboratory in stable condition.    Prepared and signed by    Jhon Mejias DO  Signed 2020 11:54:00    Study diagram    Angiographic findings  Native coronary lesions:  ï¾·Mid circumflex: Lesion 1: 30 % stenosis.  ï¾·Proximal RCA: Lesion 1: 30 % stenosis.  ï¾·Mid RCA: Lesion 1: 80 % stenosis.  ï¾·Distal RCA: Lesion 1: 40 % stenosis.  Intervention results  Native coronary lesions:  ï¾·balloon angioplasty and stent of the 80 % stenosis in mid RCA. 0 % residual stenosis. Stent: Resolute [place] Rx 3.0 x 12mm drug-eluting.    Hemodynamic tables    Pressures:  Baseline  Pressures:  - HR: 77  Pressures:  - Rhythm:  Pressures:  -- Aortic Pressure (S/D/M): 160/100/126    Outputs:  Baseline  Outputs:  -- CALCULATIONS[age]  Outputs:  -- CALCULATIONS: Body Surface Area: 2.69  Outputs:  -- CALCULATIONS: Height in cm: 175.00  Outputs:  -- CALCULATIONS: Sex: Male  Outputs:  -- CALCULATIONS: Weight in k.00    No results found for this or any previous visit.    No results found for this or any previous visit.    Results for orders placed during the hospital encounter of 24    NM myocardial perfusion spect (rx stress and/or rest)    Interpretation Summary    Stress ECG: The stress ECG is equivocal for ischemia after pharmacologic vasodilation.    Perfusion: There is  a left ventricular perfusion defect that is small to medium in size present in the apical location(s) that is predominantly fixed.    Stress Combined Conclusion: Left ventricular perfusion is probably abnormal.    Stress Function: Left ventricular function post-stress is normal. Stress ejection fraction is 58%.    Perfusion Defect Conclusion: The stress/rest perfusion ratio is 1.10. There is no evidence of transient ischemic dilation (TID).    Abnormal study. Small predominantly fixed apical defect. SSS 3 SRS 1 SDS 2. No major reversible perfusion defect noted on this study. Elevated BP noted during the they study.    No results found for this or any previous visit.      Imaging:  Chest X-Ray:   XR chest pa and lateral  Result Date: 10/24/2024  Impression IMPRESSION: 1.  No acute cardiopulmonary process. Workstation:[id number]      CT-scan of the chest:     CTA chest pe study  Result Date: 2/17/2024  Impression No acute pulmonary emboli although the pulmonary arteries are suboptimally opacified. Mild diffuse bronchial wall thickening compatible with bronchitis, possibly related to RSV. Mild coronary artery calcification, greater than expected for the patient's age, with coronary stent. Workstation performed: [id number]     Lab Review   Lab Results   Component Value Date    WBC 11.71 (H) 11/16/2024    HGB 7.8 (L) 11/16/2024    HCT 22.7 (L) 11/16/2024    MCV 95 11/16/2024    RDW 13.2 11/16/2024     11/16/2024     BMP:  Lab Results   Component Value Date    SODIUM 129 (L) 11/16/2024    K 4.7 11/16/2024    CL 89 (L) 11/16/2024    CO2 25 11/16/2024    BUN 69 (H) 11/16/2024    CREATININE 10.69 (H) 11/16/2024    GLUC 127 11/16/2024    GLUF 93 09/11/2024    CALCIUM 7.4 (L) 11/16/2024    CORRECTEDCA 7.9 (L) 11/16/2024    EGFR 5 11/16/2024    MG 1.9 10/24/2024     LFT:  Lab Results   Component Value Date    AST 23 11/16/2024    ALT 18 11/16/2024    ALKPHOS 67 11/16/2024    TP 7.4 11/16/2024    ALB 3.4 (L) 11/16/2024     "  No components found for: \"TSH3\"  Lab Results   Component Value Date    [id number] 2.784 03/30/2022     Lab Results   Component Value Date    HGBA1C 5.8 (H) 10/24/2024     Lipid Profile:   Lab Results   Component Value Date    CHOLESTEROL 122 09/11/2024    HDL 25 (L) 09/11/2024    LDLCALC 68 09/11/2024    TRIG 147 09/11/2024     Lab Results   Component Value Date    CHOLESTEROL 122 09/11/2024    CHOLESTEROL 101 08/03/2024     Lab Results   Component Value Date    CKTOTAL 267 06/03/2024    TROPONINI <0.02 05/26/2021     Lab Results   Component Value Date    NTBNP 203 (H) 12/02/2021      Recent Results (from the past 4 weeks)   HEMOGLOBIN A1C    Collection Time: 10/24/24  2:25 PM   Result Value Ref Range    Hemoglobin A1C 5.8 (H) <5.7 %    eAG, EST AVG Glucose 120 mg/dL   PHOSPHORUS    Collection Time: 10/24/24  2:25 PM   Result Value Ref Range    Phosphorus 7.3 (H) 2.3 - 4.6 mg/dL   MAGNESIUM    Collection Time: 10/24/24  2:25 PM   Result Value Ref Range    Magnesium 1.9 1.4 - 2.2 mg/dL   APTT    Collection Time: 10/24/24  2:25 PM   Result Value Ref Range    PTT 28.9 21.6 - 35.6 sec   PROTIME-INR    Collection Time: 10/24/24  2:25 PM   Result Value Ref Range    Prothrombin Time 16.0 (H) 12.0 - 14.6 sec    INR 1.3    COMPREHENSIVE METABOLIC PANEL    Collection Time: 10/24/24  2:25 PM   Result Value Ref Range    Glucose 89 65 - 99 mg/dL    BUN 49 (H) 7 - 28 mg/dL    Creatinine 7.44 (H) 0.53 - 1.30 mg/dL    Sodium 138 135 - 145 mmol/L    Potassium 4.9 3.5 - 5.2 mmol/L    Chloride 97 (L) 100 - 109 mmol/L    Carbon Dioxide 27 21 - 31 mmol/L    Calcium 8.1 (L) 8.5 - 10.5 mg/dL    Alkaline Phosphatase 89 35 - 120 U/L    ALBUMIN 4.0 3.5 - 5.7 g/dL    Total Bilirubin 0.4 0.2 - 1.0 mg/dL    Protein, Total 8.1 6.3 - 8.3 g/dL    AST 22 <41 U/L    ALT 21 <56 U/L    ANION GAP 14 (H) 3 - 11    eGFRcr 9 (L) >59    eGFR Comment Interpretive information: calculated GFR    CBC and differential    Collection Time: 11/16/24  6:50 PM "   Result Value Ref Range    WBC 11.71 (H) 4.31 - 10.16 Thousand/uL    RBC 2.40 (L) 3.88 - 5.62 Million/uL    Hemoglobin 7.8 (L) 12.0 - 17.0 g/dL    Hematocrit 22.7 (L) 36.5 - 49.3 %    MCV 95 82 - 98 fL    MCH 32.5 26.8 - 34.3 pg    MCHC 34.4 31.4 - 37.4 g/dL    RDW 13.2 11.6 - 15.1 %    MPV 9.5 8.9 - 12.7 fL    Platelets 208 149 - 390 Thousands/uL   Comprehensive metabolic panel    Collection Time: 11/16/24  6:50 PM   Result Value Ref Range    Sodium 129 (L) 135 - 147 mmol/L    Potassium 4.7 3.5 - 5.3 mmol/L    Chloride 89 (L) 96 - 108 mmol/L    CO2 25 21 - 32 mmol/L    ANION GAP 15 (H) 4 - 13 mmol/L    BUN 69 (H) 5 - 25 mg/dL    Creatinine 10.69 (H) 0.60 - 1.30 mg/dL    Glucose 127 65 - 140 mg/dL    Calcium 7.4 (L) 8.4 - 10.2 mg/dL    Corrected Calcium 7.9 (L) 8.3 - 10.1 mg/dL    AST 23 13 - 39 U/L    ALT 18 7 - 52 U/L    Alkaline Phosphatase 67 34 - 104 U/L    Total Protein 7.4 6.4 - 8.4 g/dL    Albumin 3.4 (L) 3.5 - 5.0 g/dL    Total Bilirubin 0.49 0.20 - 1.00 mg/dL    eGFR 5 ml/min/1.73sq m   Blood culture #2    Collection Time: 11/16/24  6:50 PM    Specimen: Arm, Right; Blood   Result Value Ref Range    Blood Culture No Growth at 24 hrs.    Lactic acid, plasma (w/reflex if result > 2.0)    Collection Time: 11/16/24  6:50 PM   Result Value Ref Range    LACTIC ACID 1.2 0.5 - 2.0 mmol/L   Protime-INR    Collection Time: 11/16/24  6:50 PM   Result Value Ref Range    Protime 24.6 (H) 12.3 - 15.0 seconds    INR 2.18 (H) 0.85 - 1.19   APTT    Collection Time: 11/16/24  6:50 PM   Result Value Ref Range    PTT 39 (H) 23 - 34 seconds   FLU/COVID Rapid Antigen (30 min. TAT) - Preferred screening test in ED    Collection Time: 11/16/24  6:50 PM    Specimen: Nose; Nares   Result Value Ref Range    SARS COV Rapid Antigen Negative Negative    Influenza A Rapid Antigen Negative Negative    Influenza B Rapid Antigen Negative Negative   Manual Differential(PHLEBS Do Not Order)    Collection Time: 11/16/24  6:50 PM   Result  "Value Ref Range    Segmented % 81 (H) 43 - 75 %    Bands % 14 (H) 0 - 8 %    Lymphocytes % 3 (L) 14 - 44 %    Monocytes % 2 (L) 4 - 12 %    Eosinophils % 0 0 - 6 %    Basophils % 0 0 - 1 %    Absolute Neutrophils 11.12 (H) 1.85 - 7.62 Thousand/uL    Absolute Lymphocytes 0.35 (L) 0.60 - 4.47 Thousand/uL    Absolute Monocytes 0.23 0.00 - 1.22 Thousand/uL    Absolute Eosinophils 0.00 0.00 - 0.40 Thousand/uL    Absolute Basophils 0.00 0.00 - 0.10 Thousand/uL    Total Counted      Dohle Bodies Present     RBC Morphology Present     Platelet Estimate Adequate Adequate    Hypochromia Present    Blood culture #1    Collection Time: 11/16/24  7:17 PM    Specimen: Arm, Right; Blood   Result Value Ref Range    Blood Culture No Growth at 24 hrs.              Dr. Gino Fulton MD, FACC      \"This note has been constructed using a voice recognition system.Therefore there may be syntax, spelling, and/or grammatical errors. Please call if you have any questions. \"  "

## 2024-11-15 NOTE — PRE-PROCEDURE INSTRUCTIONS
Pre-Surgery Instructions:   Medication Instructions    aspirin (ECOTRIN LOW STRENGTH) 81 mg EC tablet Take day of surgery.    atorvastatin (LIPITOR) 80 mg tablet Take night before surgery    b complex-vitamin C-folic acid (RENAL) 1 mg Hold day of surgery.    calcium acetate (PHOSLO) capsule Hold day of surgery.    carvedilol (COREG) 6.25 mg tablet Take day of surgery.    Cholecalciferol (VITAMIN D3) 1,000 units tablet Hold day of surgery.    ferrous sulfate 324 MG TBEC Hold day of surgery.    isosorbide mononitrate (IMDUR) 60 mg 24 hr tablet Take day of surgery.    metolazone (ZAROXOLYN) 10 mg tablet Hold day of surgery.    NIFEdipine (PROCARDIA XL) 30 mg 24 hr tablet Take night before surgery    sevelamer (RENAGEL) 800 mg tablet Hold day of surgery.    torsemide (DEMADEX) 100 mg tablet Hold day of surgery.    warfarin (COUMADIN) 10 mg tablet Instructions provided by MD 5 day hold , last dose 11/15 per surgeon.   Medication instructions for day surgery reviewed. Please use only a sip of water to take your instructed medications. Avoid all over the counter vitamins, supplements and NSAIDS for one week prior to surgery per anesthesia guidelines. Tylenol is ok to take as needed.     You will receive a call one business day prior to surgery with an arrival time and hospital directions. If your surgery is scheduled on a Monday, the hospital will be calling you on the Friday prior to your surgery. If you have not heard from anyone by 8pm, please call the hospital supervisor through the hospital  at [phone]. ([place] [phone] or [place] [phone]).    Do not eat or drink anything after midnight the night before your surgery, including candy, mints, lifesavers, or chewing gum. Do not drink alcohol 24hrs before your surgery. Try not to smoke at least 24hrs before your surgery.       Follow the pre surgery showering instructions as listed in the “My Surgical Experience Booklet” or otherwise provided by  your surgeon's office. Do not use a blade to shave the surgical area 1 week before surgery. It is okay to use a clean electric clippers up to 24 hours before surgery. Do not apply any lotions, creams, including makeup, cologne, deodorant, or perfumes after showering on the day of your surgery. Do not use dry shampoo, hair spray, hair gel, or any type of hair products.     No contact lenses, eye make-up, or artificial eyelashes. Remove nail polish, including gel polish, and any artificial, gel, or acrylic nails if possible. Remove all jewelry including rings and body piercing jewelry.     Wear causal clothing that is easy to take on and off. Consider your type of surgery.    Keep any valuables, jewelry, piercings at home. Please bring any specially ordered equipment (sling, braces) if indicated.    Arrange for a responsible person to drive you to and from the hospital on the day of your surgery. Please confirm the visitor policy for the day of your procedure when you receive your phone call with an arrival time.     Call the surgeon's office with any new illnesses, exposures, or additional questions prior to surgery.    Please reference your “My Surgical Experience Booklet” for additional information to prepare for your upcoming surgery.    Pt. Verbalized an understanding of all instructions reviewed and offers no concerns at this time.      [facility]

## 2024-11-15 NOTE — TELEPHONE ENCOUNTER
Spoke to patient (11/15/24) in regards of medication hold for up coming procedure on (11/21/24) with (Dr. James). Last dose for (Coumadin) is (11/15/24).

## 2024-11-16 ENCOUNTER — HOSPITAL ENCOUNTER (EMERGENCY)
Facility: HOSPITAL | Age: 44
Discharge: HOME | End: 2024-11-16
Attending: EMERGENCY MEDICINE
Payer: MEDICARE

## 2024-11-16 VITALS
TEMPERATURE: 100.4 F | DIASTOLIC BLOOD PRESSURE: 53 MMHG | SYSTOLIC BLOOD PRESSURE: 107 MMHG | RESPIRATION RATE: 24 BRPM | BODY MASS INDEX: 58.08 KG/M2 | WEIGHT: 315 LBS | OXYGEN SATURATION: 95 % | HEART RATE: 76 BPM

## 2024-11-16 DIAGNOSIS — L03.90 CELLULITIS: Primary | ICD-10-CM

## 2024-11-16 LAB
ALBUMIN SERPL BCG-MCNC: 3.4 G/DL (ref 3.5–5)
ALP SERPL-CCNC: 67 U/L (ref 34–104)
ALT SERPL W P-5'-P-CCNC: 18 U/L (ref 7–52)
ANION GAP SERPL CALCULATED.3IONS-SCNC: 15 MMOL/L (ref 4–13)
APTT PPP: 39 SECONDS (ref 23–34)
AST SERPL W P-5'-P-CCNC: 23 U/L (ref 13–39)
BASOPHILS # BLD MANUAL: 0 THOUSAND/UL (ref 0–0.1)
BASOPHILS NFR MAR MANUAL: 0 % (ref 0–1)
BILIRUB SERPL-MCNC: 0.49 MG/DL (ref 0.2–1)
BUN SERPL-MCNC: 69 MG/DL (ref 5–25)
CALCIUM ALBUM COR SERPL-MCNC: 7.9 MG/DL (ref 8.3–10.1)
CALCIUM SERPL-MCNC: 7.4 MG/DL (ref 8.4–10.2)
CHLORIDE SERPL-SCNC: 89 MMOL/L (ref 96–108)
CO2 SERPL-SCNC: 25 MMOL/L (ref 21–32)
CREAT SERPL-MCNC: 10.69 MG/DL (ref 0.6–1.3)
DOHLE BOD BLD QL SMEAR: PRESENT
EOSINOPHIL # BLD MANUAL: 0 THOUSAND/UL (ref 0–0.4)
EOSINOPHIL NFR BLD MANUAL: 0 % (ref 0–6)
ERYTHROCYTE [DISTWIDTH] IN BLOOD BY AUTOMATED COUNT: 13.2 % (ref 11.6–15.1)
FLUAV AG UPPER RESP QL IA.RAPID: NEGATIVE
FLUBV AG UPPER RESP QL IA.RAPID: NEGATIVE
GFR SERPL CREATININE-BSD FRML MDRD: 5 ML/MIN/1.73SQ M
GLUCOSE SERPL-MCNC: 127 MG/DL (ref 65–140)
HCT VFR BLD AUTO: 22.7 % (ref 36.5–49.3)
HGB BLD-MCNC: 7.8 G/DL (ref 12–17)
HYPERCHROMIA BLD QL SMEAR: PRESENT
INR PPP: 2.18 (ref 0.85–1.19)
LACTATE SERPL-SCNC: 1.2 MMOL/L (ref 0.5–2)
LYMPHOCYTES # BLD AUTO: 0.35 THOUSAND/UL (ref 0.6–4.47)
LYMPHOCYTES # BLD AUTO: 3 % (ref 14–44)
MCH RBC QN AUTO: 32.5 PG (ref 26.8–34.3)
MCHC RBC AUTO-ENTMCNC: 34.4 G/DL (ref 31.4–37.4)
MCV RBC AUTO: 95 FL (ref 82–98)
MONOCYTES # BLD AUTO: 0.23 THOUSAND/UL (ref 0–1.22)
MONOCYTES NFR BLD: 2 % (ref 4–12)
NEUTROPHILS # BLD MANUAL: 11.12 THOUSAND/UL (ref 1.85–7.62)
NEUTS BAND NFR BLD MANUAL: 14 % (ref 0–8)
NEUTS SEG NFR BLD AUTO: 81 % (ref 43–75)
PLATELET # BLD AUTO: 208 THOUSANDS/UL (ref 149–390)
PLATELET BLD QL SMEAR: ADEQUATE
PMV BLD AUTO: 9.5 FL (ref 8.9–12.7)
POTASSIUM SERPL-SCNC: 4.7 MMOL/L (ref 3.5–5.3)
PROT SERPL-MCNC: 7.4 G/DL (ref 6.4–8.4)
PROTHROMBIN TIME: 24.6 SECONDS (ref 12.3–15)
RBC # BLD AUTO: 2.4 MILLION/UL (ref 3.88–5.62)
RBC MORPH BLD: PRESENT
SARS-COV+SARS-COV-2 AG RESP QL IA.RAPID: NEGATIVE
SODIUM SERPL-SCNC: 129 MMOL/L (ref 135–147)
WBC # BLD AUTO: 11.71 THOUSAND/UL (ref 4.31–10.16)

## 2024-11-16 PROCEDURE — 36415 COLL VENOUS BLD VENIPUNCTURE: CPT | Performed by: EMERGENCY MEDICINE

## 2024-11-16 PROCEDURE — 99283 EMERGENCY DEPT VISIT LOW MDM: CPT

## 2024-11-16 PROCEDURE — 85027 COMPLETE CBC AUTOMATED: CPT | Performed by: EMERGENCY MEDICINE

## 2024-11-16 PROCEDURE — 87804 INFLUENZA ASSAY W/OPTIC: CPT | Performed by: EMERGENCY MEDICINE

## 2024-11-16 PROCEDURE — 99284 EMERGENCY DEPT VISIT MOD MDM: CPT | Performed by: EMERGENCY MEDICINE

## 2024-11-16 PROCEDURE — 85610 PROTHROMBIN TIME: CPT | Performed by: EMERGENCY MEDICINE

## 2024-11-16 PROCEDURE — 83605 ASSAY OF LACTIC ACID: CPT | Performed by: EMERGENCY MEDICINE

## 2024-11-16 PROCEDURE — 85730 THROMBOPLASTIN TIME PARTIAL: CPT | Performed by: EMERGENCY MEDICINE

## 2024-11-16 PROCEDURE — 85007 BL SMEAR W/DIFF WBC COUNT: CPT | Performed by: EMERGENCY MEDICINE

## 2024-11-16 PROCEDURE — 87040 BLOOD CULTURE FOR BACTERIA: CPT | Performed by: EMERGENCY MEDICINE

## 2024-11-16 PROCEDURE — 87811 SARS-COV-2 COVID19 W/OPTIC: CPT | Performed by: EMERGENCY MEDICINE

## 2024-11-16 PROCEDURE — 80053 COMPREHEN METABOLIC PANEL: CPT | Performed by: EMERGENCY MEDICINE

## 2024-11-16 PROCEDURE — 96365 THER/PROPH/DIAG IV INF INIT: CPT

## 2024-11-16 RX ORDER — DOXYCYCLINE 100 MG/1
100 CAPSULE ORAL ONCE
Status: COMPLETED | OUTPATIENT
Start: 2024-11-16 | End: 2024-11-16

## 2024-11-16 RX ORDER — HYDROCODONE BITARTRATE AND ACETAMINOPHEN 5; 325 MG/1; MG/1
1 TABLET ORAL EVERY 6 HOURS PRN
Qty: 10 TABLET | Refills: 0 | Status: SHIPPED | OUTPATIENT
Start: 2024-11-16

## 2024-11-16 RX ORDER — ACETAMINOPHEN 10 MG/ML
1000 INJECTION, SOLUTION INTRAVENOUS ONCE
Status: COMPLETED | OUTPATIENT
Start: 2024-11-16 | End: 2024-11-16

## 2024-11-16 RX ORDER — DOXYCYCLINE 100 MG/1
100 CAPSULE ORAL 2 TIMES DAILY
Qty: 14 CAPSULE | Refills: 0 | Status: SHIPPED | OUTPATIENT
Start: 2024-11-16 | End: 2024-11-23

## 2024-11-16 RX ORDER — OXYCODONE HYDROCHLORIDE 5 MG/1
5 TABLET ORAL ONCE
Refills: 0 | Status: COMPLETED | OUTPATIENT
Start: 2024-11-16 | End: 2024-11-16

## 2024-11-16 RX ADMIN — DOXYCYCLINE 100 MG: 100 CAPSULE ORAL at 19:51

## 2024-11-16 RX ADMIN — OXYCODONE HYDROCHLORIDE 5 MG: 5 TABLET ORAL at 19:51

## 2024-11-16 RX ADMIN — ACETAMINOPHEN 1000 MG: 10 INJECTION INTRAVENOUS at 19:09

## 2024-11-17 NOTE — ED PROVIDER NOTES
"Time reflects when diagnosis was documented in both MDM as applicable and the Disposition within this note       Time User Action Codes Description Comment    11/16/2024  7:35 PM Gurjit Leyva Add [L03.90] Cellulitis           ED Disposition       ED Disposition   Discharge    Condition   Stable    Date/Time   Sat Nov 16, 2024  7:35 PM    Comment   Joaquin Frankel discharge to home/self care.                   Assessment & Plan       Medical Decision Making  Pulse ox 95% on room air indicating adequate oxygenation.        INR was therapeutic history of lymphedema doubt DVT.  Symptoms consistent with cellulitis ER workup negative for sepsis.  Will start oral antibiotics and discharged home.  Patient is amendable to this plan to request stronger for pain.    Amount and/or Complexity of Data Reviewed  Labs: ordered.    Risk  Prescription drug management.             Medications   acetaminophen (Ofirmev) injection 1,000 mg (0 mg Intravenous Stopped 11/16/24 1950)   oxyCODONE (ROXICODONE) IR tablet 5 mg (5 mg Oral Given 11/16/24 1951)   doxycycline hyclate (VIBRAMYCIN) capsule 100 mg (100 mg Oral Given 11/16/24 1951)       ED Risk Strat Scores                           SBIRT 22yo+      Flowsheet Row Most Recent Value   Initial Alcohol Screen: US AUDIT-C     1. How often do you have a drink containing alcohol? 0 Filed at: 11/16/2024 1956   Audit-C Score 0 Filed at: 11/16/2024 1956   ALEX: How many times in the past year have you...    Used an illegal drug or used a prescription medication for non-medical reasons? Never Filed at: 11/16/2024 1956                            History of Present Illness       Chief Complaint   Patient presents with    Leg Swelling     Patient on dialysis M-W-F and has lymphedema of left leg \" and sometimes it makes me sick \". States started with chills last night and pain lower left leg today, states leg is more swollen than normal and red.        Past Medical History:   Diagnosis Date    " "Acute hypoxic respiratory failure ([facility]) 10/07/2023    Arthritis     Breathing difficulty     Cellulitis 10/07/2023    Chronic kidney disease     end stage renal disease    Coronary artery disease     Diabetes mellitus (HCC)     Diabetic neuropathy ([facility]) 05/28/2021    Heart disease     CAD   s/p ptca with 1 stent 2012    Hidradenitis suppurativa     groin    History of transfusion     Hyperlipidemia     Hypertension     MI (myocardial infarction) ([facility])     \" silent \" M.I. in the past    Morbid obesity with BMI of 50.0-59.9, adult ([facility])     Nicotine dependence     Obesity     PE (pulmonary thromboembolism) ([facility])     small on left side, takes coumadin    Pilonidal cyst     Type 1 non-ST elevation myocardial infarction (NSTEMI) ([facility]) 11/29/2020      Past Surgical History:   Procedure Laterality Date    CARDIAC SURGERY      CORONARY ANGIOPLASTY WITH STENT PLACEMENT      x2    INCISION AND DRAINAGE OF WOUND N/A 01/24/2017    Procedure: INCISION AND DRAINAGE (I&D) BUTTOCK, PILONIDAL CYST;  Surgeon: Simba Adhikari MD;  Location: [facility];  Service:     IR BIOPSY BONE MARROW  06/12/2024    IR BIOPSY KIDNEY RANDOM  07/12/2024    IR BIOPSY KIDNEY RANDOM  07/19/2024    IR TEMPORARY DIALYSIS CATHETER PLACEMENT  06/06/2024    IR TUNNELED CENTRAL LINE PLACEMENT  06/14/2024    LEG SURGERY Left     I&D of left leg 2012    MN ARTERIOVENOUS ANASTOMOSIS OPEN DIRECT Left 09/26/2024    Procedure: left arm radio-cephalic AVF creation;  Surgeon: Scout James DO;  Location: [facility];  Service: Vascular    MN DEBRIDEMENT SUBCUTANEOUS TISSUE 1ST 20 SQ CM/< Left 07/06/2021    Procedure: DEBRIDEMENT WOUND (WASH OUT);  Surgeon: Sudheer Mcfarlane MD;  Location:  MAIN OR;  Service: General    MN EXC B9 LESION MRGN XCP SK TG T/A/L >4.0 CM Left 04/06/2021    Procedure: EXCISION THIGH MASS;  Surgeon: Sudheer Mcfarlane MD;  Location: [facility];  Service: General    MN EXCISION H/P/P/U COMPLEX REPAIR Left 07/06/2021    Procedure: EXCISION PERINEAL ABSCESS LEFT " THIGH;  Surgeon: Sudheer Mcfarlane MD;  [facility];  Service: General    AR NEGATIVE PRESSURE WOUND THERAPY DME <= 50 SQ CM Left 04/06/2021    Procedure: APPLICATION VAC DRESSING THIGH;  Surgeon: Sudheer Mcfarlane MD;  [facility];  Service: General    WOUND DEBRIDEMENT Left 04/17/2021    Procedure: DEBRIDEMENT WOUND AND DRESSING CHANGE (WASH OUT);  Surgeon: Mahin Traore DO;  [facility]: [facility];  Service: General    WOUND DEBRIDEMENT Left 04/22/2021    Procedure: DEBRIDEMENT LOWER EXTREMITY (WASH OUT), left groin and thigh;  Surgeon: Sudheer Mcfarlane MD;  [facility];  Service: General    WOUND DEBRIDEMENT Left 05/26/2021    Procedure: DEBRIDEMENT LOWER EXTREMITY (WASH OUT);  Surgeon: Zak Castanon DO;  [facility];  Service: General    WOUND DEBRIDEMENT Left 05/28/2021    Procedure: Washout left thigh wound and closure;  Surgeon: Amor Jaramillo DO;  [facility];  Service: General      Family History   Problem Relation Age of Onset    Heart disease Father     Diabetes Father     Hypertension Mother     Heart disease Mother       Social History     Tobacco Use    Smoking status: Former     Current packs/day: 0.00     Average packs/day: 1.5 packs/day for 20.0 years (29.9 ttl pk-yrs)     Types: Cigarettes     Start date: 01/2021     Quit date: 03/2021     Years since quitting: 3.7     Passive exposure: Past    Smokeless tobacco: Never   Vaping Use    Vaping status: Never Used   Substance Use Topics    Alcohol use: Not Currently     Comment: rarely    Drug use: No      E-Cigarette/Vaping    E-Cigarette Use Never User       E-Cigarette/Vaping Substances    Nicotine No     THC No     CBD No     Flavoring No     Other No     Unknown No       I have reviewed and agree with the history as documented.     Patient presents for evaluation of increased pain and swelling in his left leg.  Patient has a history of lymphedema in that leg and starting last night increased redness and pain  has had previous infections in that leg and that is what this feels like.  Patient is a dialysis patient is currently on Coumadin.      History provided by:  Patient   used: No        Review of Systems   All other systems reviewed and are negative.          Objective       ED Triage Vitals [11/16/24 1828]   Temperature Pulse Blood Pressure Respirations SpO2 Patient Position - Orthostatic VS   100.4 °F (38 °C) 85 126/58 (!) 24 97 % Sitting      Temp Source Heart Rate Source BP Location FiO2 (%) Pain Score    Tympanic Monitor Left arm -- 10 - Worst Possible Pain      Vitals      Date and Time Temp Pulse SpO2 Resp BP Pain Score FACES Pain Rating User   11/16/24 1951 -- -- -- -- -- 10 - Worst Possible Pain -- SW   11/16/24 1930 -- 76 95 % -- 107/53 -- -- SG   11/16/24 1828 100.4 °F (38 °C) 85 97 % 24 126/58 10 - Worst Possible Pain -- SW            Physical Exam  Vitals and nursing note reviewed.   Constitutional:       General: He is not in acute distress.  Cardiovascular:      Rate and Rhythm: Normal rate and regular rhythm.      Pulses: Normal pulses.   Pulmonary:      Effort: Pulmonary effort is normal. No respiratory distress.      Breath sounds: Normal breath sounds.   Musculoskeletal:      Left lower leg: Edema present.      Comments: Left lower extremity lymphedema with erythema and tenderness   Skin:     Capillary Refill: Capillary refill takes less than 2 seconds.      Findings: Erythema present.   Neurological:      General: No focal deficit present.      Mental Status: He is alert and oriented to person, place, and time.         Results Reviewed       Procedure Component Value Units Date/Time    Blood culture #1 [[id number]] Collected: 11/16/24 1917    Lab Status: Preliminary result Specimen: Blood from Arm, Right Updated: 11/17/24 0328     Blood Culture Received in Microbiology Lab. Culture in Progress.    Blood culture #2 [[id number]] Collected: 11/16/24 1850    Lab Status: Preliminary  result Specimen: Blood from Arm, Right Updated: 11/17/24 0301     Blood Culture Received in Microbiology Lab. Culture in Progress.    RBC Morphology Reflex Test [[id number]] Collected: 11/16/24 1850    Lab Status: Final result Specimen: Blood from Arm, Right Updated: 11/16/24 2001    CBC and differential [[id number]]  (Abnormal) Collected: 11/16/24 1850    Lab Status: Final result Specimen: Blood from Arm, Right Updated: 11/16/24 1945     WBC 11.71 Thousand/uL      RBC 2.40 Million/uL      Hemoglobin 7.8 g/dL      Hematocrit 22.7 %      MCV 95 fL      MCH 32.5 pg      MCHC 34.4 g/dL      RDW 13.2 %      MPV 9.5 fL      Platelets 208 Thousands/uL     Narrative:      This is an appended report.  These results have been appended to a previously verified report.    Manual Differential(PHLEBS Do Not Order) [[id number]]  (Abnormal) Collected: 11/16/24 1850    Lab Status: Final result Specimen: Blood from Arm, Right Updated: 11/16/24 1945     Segmented % 81 %      Bands % 14 %      Lymphocytes % 3 %      Monocytes % 2 %      Eosinophils % 0 %      Basophils % 0 %      Absolute Neutrophils 11.12 Thousand/uL      Absolute Lymphocytes 0.35 Thousand/uL      Absolute Monocytes 0.23 Thousand/uL      Absolute Eosinophils 0.00 Thousand/uL      Absolute Basophils 0.00 Thousand/uL      Total Counted --     Dohle Bodies Present     RBC Morphology Present     Platelet Estimate Adequate     Hypochromia Present    FLU/COVID Rapid Antigen (30 min. TAT) - Preferred screening test in ED [[id number]]  (Normal) Collected: 11/16/24 1850    Lab Status: Final result Specimen: Nares from Nose Updated: 11/16/24 1921     SARS COV Rapid Antigen Negative     Influenza A Rapid Antigen Negative     Influenza B Rapid Antigen Negative    Narrative:      This test has been performed using the [employer] Adenike 2 FLU+SARS Antigen test under the Emergency Use Authorization (EUA). This test has been validated by the  and verified by the performing  laboratory. The Adenike uses lateral flow immunofluorescent sandwich assay to detect SARS-COV, Influenza A and Influenza B Antigen.     The [employer] Adenike 2 SARS Antigen test does not differentiate between SARS-CoV and SARS-CoV-2.     Negative results are presumptive and may be confirmed with a molecular assay, if necessary, for patient management. Negative results do not rule out SARS-CoV-2 or influenza infection and should not be used as the sole basis for treatment or patient management decisions. A negative test result may occur if the level of antigen in a sample is below the limit of detection of this test.     Positive results are indicative of the presence of viral antigens, but do not rule out bacterial infection or co-infection with other viruses.     All test results should be used as an adjunct to clinical observations and other information available to the provider.    FOR PEDIATRIC PATIENTS - copy/paste COVID Guidelines URL to browser: [email or web address]    Protime-INR [[id number]]  (Abnormal) Collected: 11/16/24 1850    Lab Status: Final result Specimen: Blood from Arm, Right Updated: 11/16/24 1916     Protime 24.6 seconds      INR 2.18    Narrative:      INR Therapeutic Range    Indication                                             INR Range      Atrial Fibrillation                                               2.0-3.0  Hypercoagulable State                                    2.0.2.3  Left Ventricular Asist Device                            2.0-3.0  Mechanical Heart Valve                                  -    Aortic(with afib, MI, embolism, HF, LA enlargement,    and/or coagulopathy)                                     2.0-3.0 (2.5-3.5)     Mitral                                                             2.5-3.5  Prosthetic/Bioprosthetic Heart Valve               2.0-3.0  Venous thromboembolism (VTE: VT, PE        2.0-3.0    APTT [[id number]]   (Abnormal) Collected: 11/16/24 1850    Lab Status: Final result Specimen: Blood from Arm, Right Updated: 11/16/24 1916     PTT 39 seconds     Comprehensive metabolic panel [[id number]]  (Abnormal) Collected: 11/16/24 1850    Lab Status: Final result Specimen: Blood from Arm, Right Updated: 11/16/24 1913     Sodium 129 mmol/L      Potassium 4.7 mmol/L      Chloride 89 mmol/L      CO2 25 mmol/L      ANION GAP 15 mmol/L      BUN 69 mg/dL      Creatinine 10.69 mg/dL      Glucose 127 mg/dL      Calcium 7.4 mg/dL      Corrected Calcium 7.9 mg/dL      AST 23 U/L      ALT 18 U/L      Alkaline Phosphatase 67 U/L      Total Protein 7.4 g/dL      Albumin 3.4 g/dL      Total Bilirubin 0.49 mg/dL      eGFR 5 ml/min/1.73sq m     Narrative:      National Kid[employer] Disease Foundation guidelines for Chronic Kidney Disease (CKD):     Stage 1 with normal or high GFR (GFR > 90 mL/min/1.73 square meters)    Stage 2 Mild CKD (GFR = 60-89 mL/min/1.73 square meters)    Stage 3A Moderate CKD (GFR = 45-59 mL/min/1.73 square meters)    Stage 3B Moderate CKD (GFR = 30-44 mL/min/1.73 square meters)    Stage 4 Severe CKD (GFR = 15-29 mL/min/1.73 square meters)    Stage 5 End Stage CKD (GFR <15 mL/min/1.73 square meters)  Note: GFR calculation is accurate only with a steady state creatinine    Lactic acid, plasma (w/reflex if result > 2.0) [[id number]]  (Normal) Collected: 11/16/24 1850    Lab Status: Final result Specimen: Blood from Arm, Right Updated: 11/16/24 1913     LACTIC ACID 1.2 mmol/L     Narrative:      Result may be elevated if tourniquet was used during collection.            No orders to display       Procedures    ED Medication and Procedure Management   Prior to Admission Medications   Prescriptions Last Dose Informant Patient Reported? Taking?   Cholecalciferol (VITAMIN D3) 1,000 units tablet  Self No No   Sig: Take 2 tablets (2,000 Units total) by mouth daily   NIFEdipine (PROCARDIA XL) 30 mg 24 hr tablet  Self No No   Sig: Take 1  tablet (30 mg total) by mouth daily at bedtime   aspirin (ECOTRIN LOW STRENGTH) 81 mg EC tablet  Self No No   Sig: Take 1 tablet (81 mg total) by mouth daily   atorvastatin (LIPITOR) 80 mg tablet  Self No No   Sig: Take 1 tablet (80 mg total) by mouth daily   b complex-vitamin C-folic acid (RENAL) 1 mg  Self No No   Sig: Take 1 capsule by mouth daily with dinner   calcium acetate (PHOSLO) capsule  Self No No   Sig: Take 2 capsules (1,334 mg total) by mouth 3 (three) times a day   carvedilol (COREG) 6.25 mg tablet  Self No No   Sig: Take 1 tablet (6.25 mg total) by mouth 2 (two) times a day with meals   ferrous sulfate 324 MG TBEC  Self Yes No   Sig: Take 324 mg by mouth   isosorbide mononitrate (IMDUR) 60 mg 24 hr tablet  Self No No   Sig: Take 1 tablet (60 mg total) by mouth daily   metolazone (ZAROXOLYN) 10 mg tablet  Self No No   Sig: Take 1 tablet (10 mg total) by mouth 4 (four) times a week   Patient taking differently: Take 10 mg by mouth 2 (two) times a week   sevelamer (RENAGEL) 800 mg tablet  Self No No   Sig: Take 1 tablet (800 mg total) by mouth 3 (three) times a day with meals   torsemide (DEMADEX) 100 mg tablet  Self No No   Sig: Take 1 tablet (100 mg total) by mouth 4 (four) times a week   Patient taking differently: Take 100 mg by mouth daily   warfarin (COUMADIN) 10 mg tablet  Self No No   Sig: Take 1.5 tablets (15 mg total) by mouth daily      Facility-Administered Medications: None     Discharge Medication List as of 11/16/2024  7:37 PM        START taking these medications    Details   doxycycline hyclate (VIBRAMYCIN) 100 mg capsule Take 1 capsule (100 mg total) by mouth 2 (two) times a day for 7 days, Starting Sat 11/16/2024, Until Sat 11/23/2024, Normal      HYDROcodone-acetaminophen (Norco) 5-325 mg per tablet Take 1 tablet by mouth every 6 (six) hours as needed for pain for up to 10 doses Max Daily Amount: 4 tablets, Starting Sat 11/16/2024, Normal           CONTINUE these medications which  have NOT CHANGED    Details   aspirin (ECOTRIN LOW STRENGTH) 81 mg EC tablet Take 1 tablet (81 mg total) by mouth daily, Starting Thu 8/29/2024, Normal      atorvastatin (LIPITOR) 80 mg tablet Take 1 tablet (80 mg total) by mouth daily, Starting Thu 8/29/2024, Normal      b complex-vitamin C-folic acid (RENAL) 1 mg Take 1 capsule by mouth daily with dinner, Starting Wed 8/21/2024, Until Sun 5/18/2025, Normal      calcium acetate (PHOSLO) capsule Take 2 capsules (1,334 mg total) by mouth 3 (three) times a day, Starting Thu 10/31/2024, Normal      carvedilol (COREG) 6.25 mg tablet Take 1 tablet (6.25 mg total) by mouth 2 (two) times a day with meals, Starting Thu 10/31/2024, Until Tue 4/29/2025, Normal      Cholecalciferol (VITAMIN D3) 1,000 units tablet Take 2 tablets (2,000 Units total) by mouth daily, Starting Wed 8/14/2024, Normal      ferrous sulfate 324 MG TBEC Take 324 mg by mouth, Starting Wed 8/14/2024, Historical Med      isosorbide mononitrate (IMDUR) 60 mg 24 hr tablet Take 1 tablet (60 mg total) by mouth daily, Starting Thu 8/29/2024, Normal      metolazone (ZAROXOLYN) 10 mg tablet Take 1 tablet (10 mg total) by mouth 4 (four) times a week, Starting Thu 10/31/2024, Normal      NIFEdipine (PROCARDIA XL) 30 mg 24 hr tablet Take 1 tablet (30 mg total) by mouth daily at bedtime, Starting Thu 10/31/2024, Until Mon 7/28/2025, Normal      sevelamer (RENAGEL) 800 mg tablet Take 1 tablet (800 mg total) by mouth 3 (three) times a day with meals, Starting Thu 10/31/2024, Normal      torsemide (DEMADEX) 100 mg tablet Take 1 tablet (100 mg total) by mouth 4 (four) times a week, Starting Thu 10/31/2024, Until Tue 4/29/2025, Normal      warfarin (COUMADIN) 10 mg tablet Take 1.5 tablets (15 mg total) by mouth daily, Starting Tue 11/12/2024, Normal           No discharge procedures on file.  ED SEPSIS DOCUMENTATION   Time reflects when diagnosis was documented in both MDM as applicable and the Disposition within this note        Time User Action Codes Description Comment    11/16/2024  7:35 PM Gurjit Leyva Add [L03.90] Cellulitis                  Gurjit Leyva, DO  11/17/24 1632

## 2024-11-18 ENCOUNTER — OFFICE VISIT (OUTPATIENT)
Dept: CARDIOLOGY CLINIC | Facility: CLINIC | Age: 44
End: 2024-11-18
Payer: MEDICARE

## 2024-11-18 VITALS
OXYGEN SATURATION: 95 % | DIASTOLIC BLOOD PRESSURE: 52 MMHG | WEIGHT: 315 LBS | HEART RATE: 60 BPM | BODY MASS INDEX: 49.44 KG/M2 | HEIGHT: 67 IN | SYSTOLIC BLOOD PRESSURE: 108 MMHG

## 2024-11-18 DIAGNOSIS — Z99.2 ESRD ON DIALYSIS (HCC): ICD-10-CM

## 2024-11-18 DIAGNOSIS — Z99.2 DIALYSIS PATIENT (HCC): ICD-10-CM

## 2024-11-18 DIAGNOSIS — I25.10 CORONARY ARTERY DISEASE INVOLVING NATIVE CORONARY ARTERY OF NATIVE HEART WITHOUT ANGINA PECTORIS: ICD-10-CM

## 2024-11-18 DIAGNOSIS — Z99.2 TYPE 2 DIABETES MELLITUS WITH CHRONIC KIDNEY DISEASE ON CHRONIC DIALYSIS, WITHOUT LONG-TERM CURRENT USE OF INSULIN (HCC): ICD-10-CM

## 2024-11-18 DIAGNOSIS — N18.6 ESRD ON DIALYSIS (HCC): ICD-10-CM

## 2024-11-18 DIAGNOSIS — D63.8 ANEMIA OF CHRONIC DISEASE: ICD-10-CM

## 2024-11-18 DIAGNOSIS — E11.22 TYPE 2 DIABETES MELLITUS WITH CHRONIC KIDNEY DISEASE ON CHRONIC DIALYSIS, WITHOUT LONG-TERM CURRENT USE OF INSULIN (HCC): ICD-10-CM

## 2024-11-18 DIAGNOSIS — I10 PRIMARY HYPERTENSION: ICD-10-CM

## 2024-11-18 DIAGNOSIS — N18.6 TYPE 2 DIABETES MELLITUS WITH CHRONIC KIDNEY DISEASE ON CHRONIC DIALYSIS, WITHOUT LONG-TERM CURRENT USE OF INSULIN (HCC): ICD-10-CM

## 2024-11-18 DIAGNOSIS — Z01.810 PRE-OPERATIVE CARDIOVASCULAR EXAMINATION: Primary | ICD-10-CM

## 2024-11-18 PROCEDURE — 93000 ELECTROCARDIOGRAM COMPLETE: CPT | Performed by: INTERNAL MEDICINE

## 2024-11-18 PROCEDURE — 99214 OFFICE O/P EST MOD 30 MIN: CPT | Performed by: INTERNAL MEDICINE

## 2024-11-19 ENCOUNTER — TELEPHONE (OUTPATIENT)
Age: 44
End: 2024-11-19

## 2024-11-19 NOTE — TELEPHONE ENCOUNTER
Received call from pt.  He is scheduled for AVF creation 11/21 with Dr. James.  Pt was in hospital 11/16 and diagnosed with cellulitis in his left leg.  He was prescribed doxycycline hyclate for 7 days, started it 11/16.  Pt is asking if surgery date can be kept or if it has to be rescheduled.  Pt also questioning date of his hemodialysis access duplex scheduled 11/26 asking if that date is too soon if surgery is still going to be 11/21.    Please advise.

## 2024-11-19 NOTE — TELEPHONE ENCOUNTER
Patient reports symptoms are improving, wondering if taking the antibiotic is going to contradict with anything? Please advise

## 2024-11-19 NOTE — TELEPHONE ENCOUNTER
Discussed with Dr. James. As long as his LLE symptoms are resolving we can keep him on the OR schedule for 11/21. If his symptoms do not improve or worsen in the meantime, his surgery should be postponed until symptoms resolve.

## 2024-11-21 ENCOUNTER — HOSPITAL ENCOUNTER (OUTPATIENT)
Facility: HOSPITAL | Age: 44
Setting detail: OUTPATIENT SURGERY
Discharge: HOME | End: 2024-11-21
Attending: SURGERY | Admitting: SURGERY
Payer: MEDICARE

## 2024-11-21 ENCOUNTER — OFFICE VISIT (OUTPATIENT)
Dept: URGENT CARE | Facility: CLINIC | Age: 44
End: 2024-11-21
Payer: MEDICARE

## 2024-11-21 ENCOUNTER — HOSPITAL ENCOUNTER (EMERGENCY)
Facility: HOSPITAL | Age: 44
Discharge: HOME | End: 2024-11-21
Attending: EMERGENCY MEDICINE
Payer: MEDICARE

## 2024-11-21 ENCOUNTER — APPOINTMENT (EMERGENCY)
Dept: RADIOLOGY | Facility: HOSPITAL | Age: 44
End: 2024-11-21
Payer: MEDICARE

## 2024-11-21 VITALS
HEIGHT: 68 IN | RESPIRATION RATE: 20 BRPM | WEIGHT: 315 LBS | OXYGEN SATURATION: 98 % | BODY MASS INDEX: 47.74 KG/M2 | SYSTOLIC BLOOD PRESSURE: 170 MMHG | HEART RATE: 80 BPM | DIASTOLIC BLOOD PRESSURE: 77 MMHG | TEMPERATURE: 97 F

## 2024-11-21 VITALS
DIASTOLIC BLOOD PRESSURE: 56 MMHG | HEART RATE: 76 BPM | TEMPERATURE: 100.8 F | WEIGHT: 315 LBS | BODY MASS INDEX: 49.44 KG/M2 | HEIGHT: 67 IN | SYSTOLIC BLOOD PRESSURE: 114 MMHG | RESPIRATION RATE: 20 BRPM

## 2024-11-21 VITALS
DIASTOLIC BLOOD PRESSURE: 74 MMHG | SYSTOLIC BLOOD PRESSURE: 120 MMHG | TEMPERATURE: 98 F | WEIGHT: 315 LBS | HEIGHT: 68 IN | BODY MASS INDEX: 47.74 KG/M2 | RESPIRATION RATE: 18 BRPM | OXYGEN SATURATION: 98 %

## 2024-11-21 DIAGNOSIS — M79.605 LEFT LEG PAIN: Primary | ICD-10-CM

## 2024-11-21 DIAGNOSIS — M79.89 PAIN AND SWELLING OF LEFT LOWER LEG: Primary | ICD-10-CM

## 2024-11-21 DIAGNOSIS — I89.0 LYMPHEDEMA DUE TO CHRONIC INFLAMMATION: ICD-10-CM

## 2024-11-21 DIAGNOSIS — M79.662 PAIN AND SWELLING OF LEFT LOWER LEG: Primary | ICD-10-CM

## 2024-11-21 LAB
ALBUMIN SERPL BCG-MCNC: 3.5 G/DL (ref 3.5–5)
ALP SERPL-CCNC: 110 U/L (ref 34–104)
ALT SERPL W P-5'-P-CCNC: 18 U/L (ref 7–52)
ANION GAP SERPL CALCULATED.3IONS-SCNC: 13 MMOL/L (ref 4–13)
ANISOCYTOSIS BLD QL SMEAR: PRESENT
APTT PPP: 36 SECONDS (ref 23–34)
AST SERPL W P-5'-P-CCNC: 20 U/L (ref 13–39)
BASO STIPL BLD QL SMEAR: PRESENT
BASOPHILS # BLD MANUAL: 0 THOUSAND/UL (ref 0–0.1)
BASOPHILS NFR MAR MANUAL: 0 % (ref 0–1)
BILIRUB SERPL-MCNC: 0.33 MG/DL (ref 0.2–1)
BUN SERPL-MCNC: 70 MG/DL (ref 5–25)
CALCIUM SERPL-MCNC: 8.3 MG/DL (ref 8.4–10.2)
CHLORIDE SERPL-SCNC: 95 MMOL/L (ref 96–108)
CO2 SERPL-SCNC: 28 MMOL/L (ref 21–32)
CREAT SERPL-MCNC: 9.84 MG/DL (ref 0.6–1.3)
EOSINOPHIL # BLD MANUAL: 0 THOUSAND/UL (ref 0–0.4)
EOSINOPHIL NFR BLD MANUAL: 0 % (ref 0–6)
ERYTHROCYTE [DISTWIDTH] IN BLOOD BY AUTOMATED COUNT: 13.2 % (ref 11.6–15.1)
GFR SERPL CREATININE-BSD FRML MDRD: 5 ML/MIN/1.73SQ M
GLUCOSE SERPL-MCNC: 112 MG/DL (ref 65–140)
GLUCOSE SERPL-MCNC: 99 MG/DL (ref 65–140)
HCT VFR BLD AUTO: 22.2 % (ref 36.5–49.3)
HGB BLD-MCNC: 7.6 G/DL (ref 12–17)
INR PPP: 1.13 (ref 0.85–1.19)
LACTATE SERPL-SCNC: 0.5 MMOL/L (ref 0.5–2)
LYMPHOCYTES # BLD AUTO: 1.67 THOUSAND/UL (ref 0.6–4.47)
LYMPHOCYTES # BLD AUTO: 14 % (ref 14–44)
MCH RBC QN AUTO: 32.6 PG (ref 26.8–34.3)
MCHC RBC AUTO-ENTMCNC: 34.2 G/DL (ref 31.4–37.4)
MCV RBC AUTO: 95 FL (ref 82–98)
METAMYELOCYTE ABSOLUTE CT: 0.12 THOUSAND/UL (ref 0–0.1)
METAMYELOCYTES NFR BLD MANUAL: 1 % (ref 0–1)
MONOCYTES # BLD AUTO: 0.83 THOUSAND/UL (ref 0–1.22)
MONOCYTES NFR BLD: 7 % (ref 4–12)
MYELOCYTE ABSOLUTE CT: 0.24 THOUSAND/UL (ref 0–0.1)
MYELOCYTES NFR BLD MANUAL: 2 % (ref 0–1)
NEUTROPHILS # BLD MANUAL: 9.06 THOUSAND/UL (ref 1.85–7.62)
NEUTS BAND NFR BLD MANUAL: 1 % (ref 0–8)
NEUTS SEG NFR BLD AUTO: 75 % (ref 43–75)
PLATELET # BLD AUTO: 309 THOUSANDS/UL (ref 149–390)
PLATELET BLD QL SMEAR: ADEQUATE
PMV BLD AUTO: 8.9 FL (ref 8.9–12.7)
POLYCHROMASIA BLD QL SMEAR: PRESENT
POTASSIUM SERPL-SCNC: 4.4 MMOL/L (ref 3.5–5.3)
PROT SERPL-MCNC: 8.2 G/DL (ref 6.4–8.4)
PROTHROMBIN TIME: 15 SECONDS (ref 12.3–15)
RBC # BLD AUTO: 2.33 MILLION/UL (ref 3.88–5.62)
RBC MORPH BLD: PRESENT
SODIUM SERPL-SCNC: 136 MMOL/L (ref 135–147)
TOXIC GRANULES BLD QL SMEAR: PRESENT
WBC # BLD AUTO: 11.92 THOUSAND/UL (ref 4.31–10.16)

## 2024-11-21 PROCEDURE — G2211 COMPLEX E/M VISIT ADD ON: HCPCS | Performed by: FAMILY MEDICINE

## 2024-11-21 PROCEDURE — 85610 PROTHROMBIN TIME: CPT | Performed by: PHYSICIAN ASSISTANT

## 2024-11-21 PROCEDURE — NC001 PR NO CHARGE: Performed by: SURGERY

## 2024-11-21 PROCEDURE — 85730 THROMBOPLASTIN TIME PARTIAL: CPT | Performed by: PHYSICIAN ASSISTANT

## 2024-11-21 PROCEDURE — 99215 OFFICE O/P EST HI 40 MIN: CPT | Performed by: FAMILY MEDICINE

## 2024-11-21 PROCEDURE — 93971 EXTREMITY STUDY: CPT

## 2024-11-21 PROCEDURE — 85027 COMPLETE CBC AUTOMATED: CPT | Performed by: PHYSICIAN ASSISTANT

## 2024-11-21 PROCEDURE — 83605 ASSAY OF LACTIC ACID: CPT | Performed by: PHYSICIAN ASSISTANT

## 2024-11-21 PROCEDURE — 99285 EMERGENCY DEPT VISIT HI MDM: CPT | Performed by: PHYSICIAN ASSISTANT

## 2024-11-21 PROCEDURE — 36415 COLL VENOUS BLD VENIPUNCTURE: CPT | Performed by: PHYSICIAN ASSISTANT

## 2024-11-21 PROCEDURE — 80053 COMPREHEN METABOLIC PANEL: CPT | Performed by: PHYSICIAN ASSISTANT

## 2024-11-21 PROCEDURE — 85007 BL SMEAR W/DIFF WBC COUNT: CPT | Performed by: PHYSICIAN ASSISTANT

## 2024-11-21 PROCEDURE — 82948 REAGENT STRIP/BLOOD GLUCOSE: CPT

## 2024-11-21 RX ORDER — HYDROMORPHONE HCL/PF 1 MG/ML
0.5 SYRINGE (ML) INJECTION ONCE
Refills: 0 | Status: COMPLETED | OUTPATIENT
Start: 2024-11-21 | End: 2024-11-21

## 2024-11-21 RX ORDER — CHLORHEXIDINE GLUCONATE ORAL RINSE 1.2 MG/ML
15 SOLUTION DENTAL ONCE
Status: DISCONTINUED | OUTPATIENT
Start: 2024-11-21 | End: 2024-11-21 | Stop reason: HOSPADM

## 2024-11-21 RX ORDER — ACETAMINOPHEN 10 MG/ML
1000 INJECTION, SOLUTION INTRAVENOUS ONCE
Status: COMPLETED | OUTPATIENT
Start: 2024-11-21 | End: 2024-11-21

## 2024-11-21 RX ADMIN — ACETAMINOPHEN 1000 MG: 10 INJECTION INTRAVENOUS at 15:39

## 2024-11-21 RX ADMIN — HYDROMORPHONE HYDROCHLORIDE 0.5 MG: 1 INJECTION, SOLUTION INTRAMUSCULAR; INTRAVENOUS; SUBCUTANEOUS at 16:15

## 2024-11-21 RX ADMIN — HYDROMORPHONE HYDROCHLORIDE 0.5 MG: 1 INJECTION, SOLUTION INTRAMUSCULAR; INTRAVENOUS; SUBCUTANEOUS at 15:39

## 2024-11-21 NOTE — CONSULTS
Vascular Surgery    Left leg chronic lymphedema and recent onset cellulitis.  He has been treated with PO abx since Sunday and states he thinks cellulitis is improving, but he had fever 101 this am in preop, erythematous calf and malaise.  Will re-schedule for AVF.

## 2024-11-21 NOTE — PERIOPERATIVE NURSING NOTE
Patient has a fever of 101. F. Currently on oral antibiotics for left lower extremity cellulitis. Provider and anesthesia made aware.

## 2024-11-21 NOTE — ED PROVIDER NOTES
Time reflects when diagnosis was documented in both MDM as applicable and the Disposition within this note       Time User Action Codes Description Comment    11/21/2024  4:38 PM Warren Mcfarlane [M79.605] Left leg pain     11/21/2024  4:39 PM Warren Mcfarlane [I89.0] Lymphedema due to chronic inflammation           ED Disposition       ED Disposition   Discharge    Condition   Stable    Date/Time   Thu Nov 21, 2024  4:37 PM    Comment   Joaquin Frankel discharge to home/self care.                   Assessment & Plan       Medical Decision Making  44-year-old male presents emerged part for persistent left lower leg pain and swelling.  Chronic history of lymphedema left lower leg.  Past medical history reviewed on chart.  Patient takes Coumadin regularly but was held due to potential need for fistula placement this day.  Patient with persistent left lower leg pain and has been taking doxycycline as instructed from prior visit.  No evidence of infection on exam.  Labs remained stable from prior with improvement with neutrophils as well as lactic acid.  This is chronic in nature.  Will refer patient to wound care for evaluation and management with compression stockings as well as other potential specialist interventions.  Encourage patient to continue to take doxycycline as prescribed.  Do not feel changing antibiotics at this time will aid in improvement.  Ultrasound performed no evidence of DVT.  Patient provided IV Tylenol and Dilaudid in the department.  Patient states he was provided narcotic medications at discharge at last visit.  At this time do not feel that narcotic medications are needed.  Patient needs to follow-up with appropriate specialists for evaluation and management of left lower extremity lymphedema.  Educated patient on persistent worsening signs symptoms or any concern to either follow-up with primary care wound care and or return to the emergency department.  Patient admitted understanding  and agreement.    Amount and/or Complexity of Data Reviewed  Labs: ordered.    Risk  Prescription drug management.             Medications   acetaminophen (Ofirmev) injection 1,000 mg (0 mg Intravenous Stopped 11/21/24 1645)   HYDROmorphone (DILAUDID) injection 0.5 mg (0.5 mg Intravenous Given 11/21/24 1539)   HYDROmorphone (DILAUDID) injection 0.5 mg (0.5 mg Intravenous Given 11/21/24 1615)       ED Risk Strat Scores                           SBIRT 20yo+      Flowsheet Row Most Recent Value   Initial Alcohol Screen: US AUDIT-C     1. How often do you have a drink containing alcohol? 0 Filed at: 11/21/2024 1446   2. How many drinks containing alcohol do you have on a typical day you are drinking?  0 Filed at: 11/21/2024 1446   3a. Male UNDER 65: How often do you have five or more drinks on one occasion? 0 Filed at: 11/21/2024 1446   3b. FEMALE Any Age, or MALE 65+: How often do you have 4 or more drinks on one occassion? 0 Filed at: 11/21/2024 1446   Audit-C Score 0 Filed at: 11/21/2024 1446   ALEX: How many times in the past year have you...    Used an illegal drug or used a prescription medication for non-medical reasons? Never Filed at: 11/21/2024 1446                            History of Present Illness       Chief Complaint   Patient presents with    Leg Pain     Left leg pain that he states is lymphedema. Was supposed to have left arm fistula today but states the pain in leg was too severe to go through with it. States if you try to give him tylenol for the pain he will get upset       Past Medical History:   Diagnosis Date    Acute hypoxic respiratory failure (HCC) 10/07/2023    Arthritis     Breathing difficulty     Cellulitis 10/07/2023    Chronic kidney disease     end stage renal disease    Coronary artery disease     Diabetes mellitus (HCC)     Diabetic neuropathy (HCC) 05/28/2021    Heart disease     CAD   s/p ptca with 1 stent 2012    Hidradenitis suppurativa     groin    History of transfusion      "Hyperlipidemia     Hypertension     MI (myocardial infarction) (HCC)     \" silent \" M.I. in the past    Morbid obesity with BMI of 50.0-59.9, adult (HCC)     Nicotine dependence     Obesity     PE (pulmonary thromboembolism) (HCC)     small on left side, takes coumadin    Pilonidal cyst     Type 1 non-ST elevation myocardial infarction (NSTEMI) (HCC) 11/29/2020      Past Surgical History:   Procedure Laterality Date    CARDIAC SURGERY      CORONARY ANGIOPLASTY WITH STENT PLACEMENT      x2    INCISION AND DRAINAGE OF WOUND N/A 01/24/2017    Procedure: INCISION AND DRAINAGE (I&D) BUTTOCK, PILONIDAL CYST;  Surgeon: Simba Adhikari MD;  Location: [facility];  Service:     IR BIOPSY BONE MARROW  06/12/2024    IR BIOPSY KIDNEY RANDOM  07/12/2024    IR BIOPSY KIDNEY RANDOM  07/19/2024    IR TEMPORARY DIALYSIS CATHETER PLACEMENT  06/06/2024    IR TUNNELED CENTRAL LINE PLACEMENT  06/14/2024    LEG SURGERY Left     I&D of left leg 2012    TN ARTERIOVENOUS ANASTOMOSIS OPEN DIRECT Left 09/26/2024    Procedure: left arm radio-cephalic AVF creation;  Surgeon: Scout James DO;  Location: [facility];  Service: Vascular    TN DEBRIDEMENT SUBCUTANEOUS TISSUE 1ST 20 SQ CM/< Left 07/06/2021    Procedure: DEBRIDEMENT WOUND (WASH OUT);  Surgeon: Sudheer Mcfarlane MD;  Location: [facility];  Service: General    TN EXC B9 LESION MRGN XCP SK TG T/A/L >4.0 CM Left 04/06/2021    Procedure: EXCISION THIGH MASS;  Surgeon: Sudheer Mcfarlane MD;  Location: [facility];  Service: General    TN EXCISION H/P/P/U COMPLEX REPAIR Left 07/06/2021    Procedure: EXCISION PERINEAL ABSCESS LEFT THIGH;  Surgeon: Sudheer Mcfarlane MD;  [facility]: [facility];  Service: General    TN NEGATIVE PRESSURE WOUND THERAPY DME <= 50 SQ CM Left 04/06/2021    Procedure: APPLICATION VAC DRESSING THIGH;  Surgeon: Sudheer Mcfarlane MD;  Location: [facility];  Service: General    WOUND DEBRIDEMENT Left 04/17/2021    Procedure: DEBRIDEMENT WOUND AND DRESSING CHANGE (WASH OUT);  Surgeon: Mahin Traore DO; "  Location: [facility];  Service: General    WOUND DEBRIDEMENT Left 04/22/2021    Procedure: DEBRIDEMENT LOWER EXTREMITY (WASH OUT), left groin and thigh;  Surgeon: Sudheer Mcfarlane MD;  [facility]: [facility];  Service: General    WOUND DEBRIDEMENT Left 05/26/2021    Procedure: DEBRIDEMENT LOWER EXTREMITY (WASH OUT);  Surgeon: aZk Castanon DO;  [facility]: [facility];  Service: General    WOUND DEBRIDEMENT Left 05/28/2021    Procedure: Washout left thigh wound and closure;  Surgeon: Amor Jaramillo DO;  Location: [facility];  Service: General      Family History   Problem Relation Age of Onset    Heart disease Father     Diabetes Father     Hypertension Mother     Heart disease Mother       Social History     Tobacco Use    Smoking status: Former     Current packs/day: 0.00     Average packs/day: 1.5 packs/day for 20.0 years (29.9 ttl pk-yrs)     Types: Cigarettes     Start date: 01/2021     Quit date: 03/2021     Years since quitting: 3.7     Passive exposure: Past    Smokeless tobacco: Never   Vaping Use    Vaping status: Never Used   Substance Use Topics    Alcohol use: Not Currently     Comment: rarely    Drug use: No      E-Cigarette/Vaping    E-Cigarette Use Never User       E-Cigarette/Vaping Substances    Nicotine No     THC No     CBD No     Flavoring No     Other No     Unknown No       I have reviewed and agree with the history as documented.       Leg Pain  Location:  Leg  Leg location:  L lower leg  Pain details:     Quality:  Aching    Severity:  Moderate    Onset quality:  Gradual    Duration: Chronic.  Chronicity:  Chronic  Dislocation: no    Associated symptoms: stiffness and swelling    Associated symptoms: no decreased ROM and no fever    Risk factors: obesity    Risk factors comment:  Chronic lymphedema left leg.  Treated recently for cellulitis.      Review of Systems   Constitutional:  Negative for fever.   Musculoskeletal:  Positive for arthralgias (Left lower leg.) and stiffness.   Skin:   Negative for rash and wound.   All other systems reviewed and are negative.          Objective       ED Triage Vitals   Temperature Pulse Blood Pressure Respirations SpO2 Patient Position - Orthostatic VS   11/21/24 1446 11/21/24 1446 11/21/24 1449 11/21/24 1446 11/21/24 1446 --   97.6 °F (36.4 °C) 82 170/77 22 96 %       Temp Source Heart Rate Source BP Location FiO2 (%) Pain Score    11/21/24 1716 -- -- -- 11/21/24 1446    Oral    9      Vitals      Date and Time Temp Pulse SpO2 Resp BP Pain Score FACES Pain Rating User   11/21/24 1716 97 °F (36.1 °C) 80 98 % 20 170/77 -- -- MDD   11/21/24 1615 -- -- -- -- -- 9 -- MB   11/21/24 1539 -- -- -- -- -- 10 - Worst Possible Pain -- MDD   11/21/24 1449 -- -- -- -- 170/77 -- -- ATIF   11/21/24 1446 97.6 °F (36.4 °C) 82 96 % 22 -- 9 -- ATIF            Physical Exam  Constitutional:       Appearance: Normal appearance. He is obese.   HENT:      Head: Normocephalic and atraumatic.      Nose: Nose normal.      Mouth/Throat:      Pharynx: Oropharynx is clear.   Abdominal:      General: There is no distension.      Tenderness: There is no abdominal tenderness.      Comments: Obese abdomen.   Musculoskeletal:         General: Swelling and tenderness present. Normal range of motion.      Cervical back: Normal range of motion.      Comments: Chronic left leg swelling secondary to lymphedema.  Please see imaging.   Skin:     General: Skin is warm and dry.      Findings: No rash.      Comments: No evidence of cellulitis of the left lower extremity.   Neurological:      General: No focal deficit present.      Mental Status: He is alert and oriented to person, place, and time.   Psychiatric:         Mood and Affect: Mood normal.         Behavior: Behavior normal.                   Results Reviewed       Procedure Component Value Units Date/Time    RBC Morphology Reflex Test [[id number]] Collected: 11/21/24 1532    Lab Status: Final result Specimen: Blood from Arm, Right Updated: 11/21/24  1702    CBC and differential [[id number]]  (Abnormal) Collected: 11/21/24 1532    Lab Status: Final result Specimen: Blood from Arm, Right Updated: 11/21/24 1625     WBC 11.92 Thousand/uL      RBC 2.33 Million/uL      Hemoglobin 7.6 g/dL      Hematocrit 22.2 %      MCV 95 fL      MCH 32.6 pg      MCHC 34.2 g/dL      RDW 13.2 %      MPV 8.9 fL      Platelets 309 Thousands/uL     Narrative:      This is an appended report.  These results have been appended to a previously verified report.    Manual Differential(PHLEBS Do Not Order) [[id number]]  (Abnormal) Collected: 11/21/24 1532    Lab Status: Final result Specimen: Blood from Arm, Right Updated: 11/21/24 1625     Segmented % 75 %      Bands % 1 %      Lymphocytes % 14 %      Monocytes % 7 %      Eosinophils % 0 %      Basophils % 0 %      Metamyelocytes % 1 %      Myelocytes % 2 %      Absolute Neutrophils 9.06 Thousand/uL      Absolute Lymphocytes 1.67 Thousand/uL      Absolute Monocytes 0.83 Thousand/uL      Absolute Eosinophils 0.00 Thousand/uL      Absolute Basophils 0.00 Thousand/uL      Absolute Metamyelocytes 0.12 Thousand/uL      Absolute Myelocytes 0.24 Thousand/uL      Total Counted --     Toxic Granulation Present     RBC Morphology Present     Platelet Estimate Adequate     Anisocytosis Present     Basophilic Stippling Present     Polychromasia Present    Lactic acid, plasma (w/reflex if result > 2.0) [[id number]]  (Normal) Collected: 11/21/24 1532    Lab Status: Final result Specimen: Blood from Arm, Right Updated: 11/21/24 1556     LACTIC ACID 0.5 mmol/L     Narrative:      Result may be elevated if tourniquet was used during collection.    Comprehensive metabolic panel [[id number]]  (Abnormal) Collected: 11/21/24 1532    Lab Status: Final result Specimen: Blood from Arm, Right Updated: 11/21/24 1555     Sodium 136 mmol/L      Potassium 4.4 mmol/L      Chloride 95 mmol/L      CO2 28 mmol/L      ANION GAP 13 mmol/L      BUN 70 mg/dL      Creatinine 9.84  mg/dL      Glucose 112 mg/dL      Calcium 8.3 mg/dL      AST 20 U/L      ALT 18 U/L      Alkaline Phosphatase 110 U/L      Total Protein 8.2 g/dL      Albumin 3.5 g/dL      Total Bilirubin 0.33 mg/dL      eGFR 5 ml/min/1.73sq m     Narrative:      [employer] guidelines for Chronic Kidney Disease (CKD):     Stage 1 with normal or high GFR (GFR > 90 mL/min/1.73 square meters)    Stage 2 Mild CKD (GFR = 60-89 mL/min/1.73 square meters)    Stage 3A Moderate CKD (GFR = 45-59 mL/min/1.73 square meters)    Stage 3B Moderate CKD (GFR = 30-44 mL/min/1.73 square meters)    Stage 4 Severe CKD (GFR = 15-29 mL/min/1.73 square meters)    Stage 5 End Stage CKD (GFR <15 mL/min/1.73 square meters)  Note: GFR calculation is accurate only with a steady state creatinine    Protime-INR [[id number]]  (Normal) Collected: 11/21/24 1532    Lab Status: Final result Specimen: Blood from Arm, Right Updated: 11/21/24 1555     Protime 15.0 seconds      INR 1.13    Narrative:      INR Therapeutic Range    Indication                                             INR Range      Atrial Fibrillation                                               2.0-3.0  Hypercoagulable State                                    2.0.2.3  Left Ventricular Asist Device                            2.0-3.0  Mechanical Heart Valve                                  -    Aortic(with afib, MI, embolism, HF, LA enlargement,    and/or coagulopathy)                                     2.0-3.0 (2.5-3.5)     Mitral                                                             2.5-3.5  Prosthetic/Bioprosthetic Heart Valve               2.0-3.0  Venous thromboembolism (VTE: VT, PE        2.0-3.0    APTT [[id number]]  (Abnormal) Collected: 11/21/24 1532    Lab Status: Final result Specimen: Blood from Arm, Right Updated: 11/21/24 1555     PTT 36 seconds             VAS lower limb venous duplex study, unilateral/limited    (Results Pending)       Procedures    ED  Medication and Procedure Management   Prior to Admission Medications   Prescriptions Last Dose Informant Patient Reported? Taking?   Cholecalciferol (VITAMIN D3) 1,000 units tablet  Self No No   Sig: Take 2 tablets (2,000 Units total) by mouth daily   HYDROcodone-acetaminophen (Norco) 5-325 mg per tablet  Self No No   Sig: Take 1 tablet by mouth every 6 (six) hours as needed for pain for up to 10 doses Max Daily Amount: 4 tablets   Patient not taking: Reported on 11/21/2024   NIFEdipine (PROCARDIA XL) 30 mg 24 hr tablet  Self No No   Sig: Take 1 tablet (30 mg total) by mouth daily at bedtime   aspirin (ECOTRIN LOW STRENGTH) 81 mg EC tablet  Self No No   Sig: Take 1 tablet (81 mg total) by mouth daily   atorvastatin (LIPITOR) 80 mg tablet  Self No No   Sig: Take 1 tablet (80 mg total) by mouth daily   b complex-vitamin C-folic acid (RENAL) 1 mg  Self No No   Sig: Take 1 capsule by mouth daily with dinner   calcium acetate (PHOSLO) capsule  Self No No   Sig: Take 2 capsules (1,334 mg total) by mouth 3 (three) times a day   carvedilol (COREG) 6.25 mg tablet  Self No No   Sig: Take 1 tablet (6.25 mg total) by mouth 2 (two) times a day with meals   doxycycline hyclate (VIBRAMYCIN) 100 mg capsule  Self No No   Sig: Take 1 capsule (100 mg total) by mouth 2 (two) times a day for 7 days   ferrous sulfate 324 MG TBEC  Self Yes No   Sig: Take 324 mg by mouth   Patient not taking: Reported on 11/18/2024   isosorbide mononitrate (IMDUR) 60 mg 24 hr tablet  Self No No   Sig: Take 1 tablet (60 mg total) by mouth daily   metolazone (ZAROXOLYN) 10 mg tablet  Self No No   Sig: Take 1 tablet (10 mg total) by mouth 4 (four) times a week   sevelamer (RENAGEL) 800 mg tablet  Self No No   Sig: Take 1 tablet (800 mg total) by mouth 3 (three) times a day with meals   torsemide (DEMADEX) 100 mg tablet  Self No No   Sig: Take 1 tablet (100 mg total) by mouth 4 (four) times a week   warfarin (COUMADIN) 10 mg tablet  Self No No   Sig: Take 1.5  tablets (15 mg total) by mouth daily      Facility-Administered Medications: None     Discharge Medication List as of 11/21/2024  4:55 PM        CONTINUE these medications which have NOT CHANGED    Details   aspirin (ECOTRIN LOW STRENGTH) 81 mg EC tablet Take 1 tablet (81 mg total) by mouth daily, Starting Thu 8/29/2024, Normal      atorvastatin (LIPITOR) 80 mg tablet Take 1 tablet (80 mg total) by mouth daily, Starting Thu 8/29/2024, Normal      b complex-vitamin C-folic acid (RENAL) 1 mg Take 1 capsule by mouth daily with dinner, Starting Wed 8/21/2024, Until Sun 5/18/2025, Normal      calcium acetate (PHOSLO) capsule Take 2 capsules (1,334 mg total) by mouth 3 (three) times a day, Starting Thu 10/31/2024, Normal      carvedilol (COREG) 6.25 mg tablet Take 1 tablet (6.25 mg total) by mouth 2 (two) times a day with meals, Starting Thu 10/31/2024, Until Tue 4/29/2025, Normal      Cholecalciferol (VITAMIN D3) 1,000 units tablet Take 2 tablets (2,000 Units total) by mouth daily, Starting Wed 8/14/2024, Normal      doxycycline hyclate (VIBRAMYCIN) 100 mg capsule Take 1 capsule (100 mg total) by mouth 2 (two) times a day for 7 days, Starting Sat 11/16/2024, Until Sat 11/23/2024, Normal      ferrous sulfate 324 MG TBEC Take 324 mg by mouth, Starting Wed 8/14/2024, Historical Med      HYDROcodone-acetaminophen (Norco) 5-325 mg per tablet Take 1 tablet by mouth every 6 (six) hours as needed for pain for up to 10 doses Max Daily Amount: 4 tablets, Starting Sat 11/16/2024, Normal      isosorbide mononitrate (IMDUR) 60 mg 24 hr tablet Take 1 tablet (60 mg total) by mouth daily, Starting Thu 8/29/2024, Normal      metolazone (ZAROXOLYN) 10 mg tablet Take 1 tablet (10 mg total) by mouth 4 (four) times a week, Starting Thu 10/31/2024, Normal      NIFEdipine (PROCARDIA XL) 30 mg 24 hr tablet Take 1 tablet (30 mg total) by mouth daily at bedtime, Starting Thu 10/31/2024, Until Mon 7/28/2025, Normal      sevelamer (RENAGEL) 800 mg  tablet Take 1 tablet (800 mg total) by mouth 3 (three) times a day with meals, Starting Thu 10/31/2024, Normal      torsemide (DEMADEX) 100 mg tablet Take 1 tablet (100 mg total) by mouth 4 (four) times a week, Starting Thu 10/31/2024, Until Tue 4/29/2025, Normal      warfarin (COUMADIN) 10 mg tablet Take 1.5 tablets (15 mg total) by mouth daily, Starting Tue 11/12/2024, Normal           No discharge procedures on file.  ED SEPSIS DOCUMENTATION   Time reflects when diagnosis was documented in both MDM as applicable and the Disposition within this note       Time User Action Codes Description Comment    11/21/2024  4:38 PM Warren Mcfarlane [M79.605] Left leg pain     11/21/2024  4:39 PM Warren Mcfarlane [I89.0] Lymphedema due to chronic inflammation                  Warren Mcfarlane, PA-C  11/21/24 7305

## 2024-11-21 NOTE — PROGRESS NOTES
[facility]        NAME: Joaquin Frankel is a 44 y.o. male  : 1980    MRN: [id number]  DATE: 2024  TIME: 2:38 PM    Assessment and Plan   Pain and swelling of left lower leg [M79.662, M79.89]  1. Pain and swelling of left lower leg  Transfer to other facility            Patient Instructions       Follow up with PCP in 3-5 days.  Proceed to  ER if symptoms worsen.    If tests have been performed at [facility], our office will contact you with results if changes need to be made to the care plan discussed with you at the visit.  You can review your full results on [facility].    Chief Complaint     Chief Complaint   Patient presents with    Edema     Left leg edema with infection seen in ER Saturday supposed to have surgery today but had temp 101.          History of Present Illness       44-year-old male presents today complaining of left lower extremity pain.  He states that he has a history of lymphedema, but that his leg is 2 times the size that usually is.  He also notes that is more firm and red.  He was seen over the weekend in the ED and given antibiotics.  He states that they have not helped at all.  His pain is continued to increase since then.  He was scheduled for surgery this morning, but was told that he was running a 102 fever and referred to his family physician for additional workup and treatment.  He was unable to see his family physician and presented to the urgent care with these complaints.        Review of Systems   Review of Systems   Constitutional:  Negative for chills, fatigue and fever.   HENT:  Negative for postnasal drip and sore throat.    Respiratory:  Negative for cough and shortness of breath.    Cardiovascular:  Negative for chest pain and palpitations.   Gastrointestinal:  Negative for abdominal pain, nausea and vomiting.   Genitourinary:  Negative for dysuria.   Musculoskeletal:  Positive for gait problem and joint swelling.   Skin:  Negative  "for rash.   Neurological:  Negative for dizziness, syncope, light-headedness, numbness and headaches.   Psychiatric/Behavioral:  Negative for agitation and confusion.    All other systems reviewed and are negative.        Current Medications     No current facility-administered medications for this visit.  No current outpatient medications on file.    Facility-Administered Medications Ordered in Other Visits:     chlorhexidine (PERIDEX) 0.12 % oral rinse 15 mL, 15 mL, Swish & Spit, Once, Scout James, DO    heparin (porcine) 2,000 Units, papaverine 60 mg in multi-electrolyte (PLASMALYTE-A/ISOLYTE-S PH 7.4) 500 mL irrigation, , Irrigation, Once, Scout Sampsono, DO    vancomycin (VANCOCIN) 2,000 mg in sodium chloride 0.9 % 500 mL IVPB, 2,000 mg, Intravenous, Once, Scout James, DO    Current Allergies     Allergies as of 11/21/2024 - Reviewed 11/21/2024   Allergen Reaction Noted    Keflex [cephalexin] Facial Swelling and Lip Swelling 04/19/2024    Amoxicillin Hives 01/23/2017            The following portions of the patient's history were reviewed and updated as appropriate: allergies, current medications, past family history, past medical history, past social history, past surgical history and problem list.     Past Medical History:   Diagnosis Date    Acute hypoxic respiratory failure (HCC) 10/07/2023    Arthritis     Breathing difficulty     Cellulitis 10/07/2023    Chronic kidney disease     end stage renal disease    Coronary artery disease     Diabetes mellitus (HCC)     Diabetic neuropathy ([facility]) 05/28/2021    Heart disease     CAD   s/p ptca with 1 stent 2012    Hidradenitis suppurativa     groin    History of transfusion     Hyperlipidemia     Hypertension     MI (myocardial infarction) ([facility])     \" silent \" M.I. in the past    Morbid obesity with BMI of 50.0-59.9, adult ([facility])     Nicotine dependence     Obesity     PE (pulmonary thromboembolism) ([facility])     small on left side, takes coumadin    Pilonidal cyst  "    Type 1 non-ST elevation myocardial infarction (NSTEMI) ([facility]) 11/29/2020       Past Surgical History:   Procedure Laterality Date    CARDIAC SURGERY      CORONARY ANGIOPLASTY WITH STENT PLACEMENT      x2    INCISION AND DRAINAGE OF WOUND N/A 01/24/2017    Procedure: INCISION AND DRAINAGE (I&D) BUTTOCK, PILONIDAL CYST;  Surgeon: Simba Adhikari MD;  Location: [facility];  Service:     IR BIOPSY BONE MARROW  06/12/2024    IR BIOPSY KIDNEY RANDOM  07/12/2024    IR BIOPSY KIDNEY RANDOM  07/19/2024    IR TEMPORARY DIALYSIS CATHETER PLACEMENT  06/06/2024    IR TUNNELED CENTRAL LINE PLACEMENT  06/14/2024    LEG SURGERY Left     I&D of left leg 2012    MS ARTERIOVENOUS ANASTOMOSIS OPEN DIRECT Left 09/26/2024    Procedure: left arm radio-cephalic AVF creation;  Surgeon: Scout James DO;  Location: [facility];  Service: Vascular    MS DEBRIDEMENT SUBCUTANEOUS TISSUE 1ST 20 SQ CM/< Left 07/06/2021    Procedure: DEBRIDEMENT WOUND (WASH OUT);  Surgeon: Sudheer Mcfarlane MD;  [facility]: [facility];  Service: General    MS EXC B9 LESION MRGN XCP SK TG T/A/L >4.0 CM Left 04/06/2021    Procedure: EXCISION THIGH MASS;  Surgeon: Sudheer Mcfarlane MD;  [facility];  Service: General    MS EXCISION H/P/P/U COMPLEX REPAIR Left 07/06/2021    Procedure: EXCISION PERINEAL ABSCESS LEFT THIGH;  Surgeon: Sudheer Mcfarlane MD;  [facility];  Service: General    MS NEGATIVE PRESSURE WOUND THERAPY DME <= 50 SQ CM Left 04/06/2021    Procedure: APPLICATION VAC DRESSING THIGH;  Surgeon: Sudheer Mcfarlane MD;  [facility];  Service: General    WOUND DEBRIDEMENT Left 04/17/2021    Procedure: DEBRIDEMENT WOUND AND DRESSING CHANGE (WASH OUT);  Surgeon: Mahin Traore DO;  [facility];  Service: General    WOUND DEBRIDEMENT Left 04/22/2021    Procedure: DEBRIDEMENT LOWER EXTREMITY (WASH OUT), left groin and thigh;  Surgeon: Sudheer Mcfarlane MD;  [facility];  Service: General    WOUND DEBRIDEMENT Left 05/26/2021    Procedure: DEBRIDEMENT  "LOWER EXTREMITY (WASH OUT);  Surgeon: Zak Castanon DO;  [facility];  Service: General    WOUND DEBRIDEMENT Left 05/28/2021    Procedure: Washout left thigh wound and closure;  Surgeon: Amor Jaramillo DO;  [facility];  Service: General       Family History   Problem Relation Age of Onset    Heart disease Father     Diabetes Father     Hypertension Mother     Heart disease Mother          Medications have been verified.        Objective   /74   Temp 98 °F (36.7 °C) (Tympanic)   Resp 18   Ht 5' 8\" (1.727 m)   Wt (!) 169 kg (372 lb)   SpO2 98%   BMI 56.56 kg/m²   No LMP for male patient.       Physical Exam     Physical Exam  Vitals reviewed.   Constitutional:       General: He is not in acute distress.     Appearance: Normal appearance. He is not ill-appearing.   HENT:      Head: Normocephalic and atraumatic.   Eyes:      Extraocular Movements: Extraocular movements intact.      Conjunctiva/sclera: Conjunctivae normal.   Musculoskeletal:      Cervical back: Normal range of motion.      Left lower leg: Swelling present. 4+ Edema present.      Comments: Left lower extremity erythema, edema and firmness. Tender to palpation.    Skin:     General: Skin is warm.   Neurological:      General: No focal deficit present.      Mental Status: He is alert.   Psychiatric:         Mood and Affect: Mood normal.         Behavior: Behavior normal.         Judgment: Judgment normal.                   "

## 2024-11-22 ENCOUNTER — VBI (OUTPATIENT)
Dept: FAMILY MEDICINE CLINIC | Facility: CLINIC | Age: 44
End: 2024-11-22

## 2024-11-22 LAB
BACTERIA BLD CULT: NORMAL
BACTERIA BLD CULT: NORMAL

## 2024-11-22 PROCEDURE — 93971 EXTREMITY STUDY: CPT | Performed by: SURGERY

## 2024-11-22 NOTE — TELEPHONE ENCOUNTER
11/22/24 8:52 AM    Patient contacted post ED visit, [facility] department spoke with patient/caregiver and outreach was successful.    Thank you.  Mireya White  [facility]

## 2024-11-22 NOTE — TELEPHONE ENCOUNTER
----- Message -----  From: Scout James DO  Sent: 11/21/2024  12:20 PM EST  To: [facility] Surgery Coordinator    This patient has cellulitis in his left leg and had a fever this am.  He needs to be re-schedule for his left arm AVF.  He does not need an OV with me prior to being rescheduled.  Wait at least two weeks and have one of the nurses call him to verify his cellulitis is improving and he has been afebrile for 24 hours prior to re-scheduling.  Thanks

## 2024-11-25 ENCOUNTER — OFFICE VISIT (OUTPATIENT)
Dept: FAMILY MEDICINE CLINIC | Facility: CLINIC | Age: 44
End: 2024-11-25
Payer: MEDICARE

## 2024-11-25 VITALS
BODY MASS INDEX: 47.74 KG/M2 | DIASTOLIC BLOOD PRESSURE: 72 MMHG | TEMPERATURE: 96.1 F | HEART RATE: 66 BPM | WEIGHT: 315 LBS | HEIGHT: 68 IN | SYSTOLIC BLOOD PRESSURE: 118 MMHG

## 2024-11-25 DIAGNOSIS — Z99.2 ESRD (END STAGE RENAL DISEASE) ON DIALYSIS (HCC): ICD-10-CM

## 2024-11-25 DIAGNOSIS — L03.116 CELLULITIS OF LEFT LOWER EXTREMITY: Primary | ICD-10-CM

## 2024-11-25 DIAGNOSIS — I10 PRIMARY HYPERTENSION: ICD-10-CM

## 2024-11-25 DIAGNOSIS — Z99.2 DIALYSIS PATIENT (HCC): ICD-10-CM

## 2024-11-25 DIAGNOSIS — E66.01 MORBID OBESITY (HCC): ICD-10-CM

## 2024-11-25 DIAGNOSIS — N18.6 ESRD (END STAGE RENAL DISEASE) ON DIALYSIS (HCC): ICD-10-CM

## 2024-11-25 PROBLEM — E11.22 TYPE 2 DIABETES MELLITUS WITH CHRONIC KIDNEY DISEASE ON CHRONIC DIALYSIS, WITHOUT LONG-TERM CURRENT USE OF INSULIN (HCC): Status: RESOLVED | Noted: 2024-07-31 | Resolved: 2024-11-25

## 2024-11-25 PROBLEM — E11.3293 MILD NONPROLIFERATIVE DIABETIC RETINOPATHY OF BOTH EYES WITHOUT MACULAR EDEMA ASSOCIATED WITH TYPE 2 DIABETES MELLITUS (HCC): Status: RESOLVED | Noted: 2024-09-07 | Resolved: 2024-11-25

## 2024-11-25 PROCEDURE — 99214 OFFICE O/P EST MOD 30 MIN: CPT | Performed by: FAMILY MEDICINE

## 2024-11-25 PROCEDURE — G2211 COMPLEX E/M VISIT ADD ON: HCPCS | Performed by: FAMILY MEDICINE

## 2024-11-25 RX ORDER — OXYCODONE HYDROCHLORIDE 5 MG/1
5 CAPSULE ORAL EVERY 6 HOURS PRN
Qty: 20 CAPSULE | Refills: 0 | Status: SHIPPED | OUTPATIENT
Start: 2024-11-25

## 2024-11-25 RX ORDER — CLINDAMYCIN HYDROCHLORIDE 150 MG/1
150 CAPSULE ORAL 4 TIMES DAILY
Qty: 40 CAPSULE | Refills: 0 | Status: SHIPPED | OUTPATIENT
Start: 2024-11-25 | End: 2024-12-05

## 2024-11-25 NOTE — PATIENT INSTRUCTIONS
Please do not take ibuprofen or naproxen when taking warfarin as it may make your blood too thin.  When taking an antibiotic pill, since it may affect the INR level, you should get your INR check more often, namely about every 3 to 4 days after starting it.

## 2024-11-25 NOTE — PROGRESS NOTES
Assessment/Plan:    No problem-specific Assessment & Plan notes found for this encounter.    Right foot pain? Few days, plantar surface, no lesions or fb seen, f/u if no better    Left leg cellulitis with lymphedema  Pt reports ongoing pain/swelling despite elevation and 7d of doxycycline  Consider abx failure  Start clindamycin, advised risks of diarrhea  If gets worse then will need ER eval, possible inpt iv abx options  Use lymphedema pumps or try to get one that fits from supplier    PE  On warfarin  Risks and monitoring with abx advised  No nsaids advised/reminded  Extra monitoring while on abx and risks advised    Htn stable    Acute pain  Short term opioids  If intractable then go to ER     Diagnoses and all orders for this visit:    Cellulitis of left lower extremity  -     clindamycin (CLEOCIN) 150 mg capsule; Take 1 capsule (150 mg total) by mouth 4 times a day for 10 days  -     oxyCODONE (OXY-IR) 5 MG capsule; Take 1 capsule (5 mg total) by mouth every 6 (six) hours as needed for severe pain Max Daily Amount: 20 mg    Dialysis patient (HCC)    Primary hypertension    Morbid obesity (HCC)    ESRD (end stage renal disease) on dialysis (HCC)          Return if symptoms worsen or fail to improve.    Subjective:      Patient ID: Joaquin Frankel is a 44 y.o. male.    Chief Complaint   Patient presents with    Follow-up     ER f/u  [id number]       HPI  Just finished doxycycline 7d  No fever  Leg pain is severe  Swelling is twice the size as usual  Would rather not be admitted to hospital  Has lymphedema press device at home but does not fit  Been to PT in past also    Not much better with doxycycline.    The following portions of the patient's history were reviewed and updated as appropriate: allergies, current medications, past family history, past medical history, past social history, past surgical history and problem list.    Review of Systems   Constitutional:  Negative for chills and fever.      "    Current Outpatient Medications   Medication Sig Dispense Refill    aspirin (ECOTRIN LOW STRENGTH) 81 mg EC tablet Take 1 tablet (81 mg total) by mouth daily 90 tablet 2    atorvastatin (LIPITOR) 80 mg tablet Take 1 tablet (80 mg total) by mouth daily 90 tablet 3    b complex-vitamin C-folic acid (RENAL) 1 mg Take 1 capsule by mouth daily with dinner 30 capsule 8    calcium acetate (PHOSLO) capsule Take 2 capsules (1,334 mg total) by mouth 3 (three) times a day 540 capsule 1    carvedilol (COREG) 6.25 mg tablet Take 1 tablet (6.25 mg total) by mouth 2 (two) times a day with meals 180 tablet 1    Cholecalciferol (VITAMIN D3) 1,000 units tablet Take 2 tablets (2,000 Units total) by mouth daily 60 tablet 5    clindamycin (CLEOCIN) 150 mg capsule Take 1 capsule (150 mg total) by mouth 4 times a day for 10 days 40 capsule 0    isosorbide mononitrate (IMDUR) 60 mg 24 hr tablet Take 1 tablet (60 mg total) by mouth daily 90 tablet 2    metolazone (ZAROXOLYN) 10 mg tablet Take 1 tablet (10 mg total) by mouth 4 (four) times a week 50 tablet 1    NIFEdipine (PROCARDIA XL) 30 mg 24 hr tablet Take 1 tablet (30 mg total) by mouth daily at bedtime 90 tablet 1    oxyCODONE (OXY-IR) 5 MG capsule Take 1 capsule (5 mg total) by mouth every 6 (six) hours as needed for severe pain Max Daily Amount: 20 mg 20 capsule 0    sevelamer (RENAGEL) 800 mg tablet Take 1 tablet (800 mg total) by mouth 3 (three) times a day with meals 540 tablet 1    torsemide (DEMADEX) 100 mg tablet Take 1 tablet (100 mg total) by mouth 4 (four) times a week 50 tablet 1    warfarin (COUMADIN) 10 mg tablet Take 1.5 tablets (15 mg total) by mouth daily 135 tablet 5     No current facility-administered medications for this visit.       Objective:    /72   Pulse 66   Temp (!) 96.1 °F (35.6 °C)   Ht 5' 8\" (1.727 m)   Wt (!) 169 kg (371 lb 9.6 oz)   BMI 56.50 kg/m²        Physical Exam  Vitals and nursing note reviewed.   Constitutional:       General: He " is not in acute distress.     Appearance: He is well-developed. He is obese. He is not ill-appearing.   HENT:      Head: Normocephalic.      Right Ear: Tympanic membrane and ear canal normal.      Left Ear: Tympanic membrane and ear canal normal.      Mouth/Throat:      Mouth: Mucous membranes are moist.   Eyes:      General: No scleral icterus.     Conjunctiva/sclera: Conjunctivae normal.   Neck:      Vascular: No carotid bruit.   Cardiovascular:      Rate and Rhythm: Normal rate.      Heart sounds: No murmur heard.  Pulmonary:      Effort: Pulmonary effort is normal. No respiratory distress.      Breath sounds: No wheezing.   Abdominal:      Palpations: Abdomen is soft.   Musculoskeletal:         General: Swelling present. No deformity.      Cervical back: Neck supple.      Right lower leg: Edema present.      Left lower leg: No edema.      Comments: LLE swollen, warm, red  Homans neg  No phlegmasia   Skin:     General: Skin is warm and dry.      Coloration: Skin is not jaundiced or pale.      Findings: Erythema present. No rash.   Neurological:      Mental Status: He is alert.      Motor: No weakness.      Gait: Gait normal.   Psychiatric:         Mood and Affect: Mood normal.         Behavior: Behavior normal.         Thought Content: Thought content normal.                Hany Mcfarland DO

## 2024-12-09 ENCOUNTER — TELEPHONE (OUTPATIENT)
Dept: VASCULAR SURGERY | Facility: CLINIC | Age: 44
End: 2024-12-09

## 2024-12-09 NOTE — TELEPHONE ENCOUNTER
Called and s/w pt, he states that the cellulitis has improved. Pt has completed antibiotics. Pt uses compression whenever possible. Pt denies new wounds. Pt denies recent fevers or chills.

## 2024-12-11 ENCOUNTER — PREP FOR PROCEDURE (OUTPATIENT)
Dept: VASCULAR SURGERY | Facility: CLINIC | Age: 44
End: 2024-12-11

## 2024-12-12 ENCOUNTER — ANESTHESIA EVENT (OUTPATIENT)
Dept: PERIOP | Facility: HOSPITAL | Age: 44
End: 2024-12-12
Payer: COMMERCIAL

## 2024-12-17 NOTE — PRE-PROCEDURE INSTRUCTIONS
Pre-Surgery Instructions:   Medication Instructions    aspirin (ECOTRIN LOW STRENGTH) 81 mg EC tablet Hold day of surgery.    atorvastatin (LIPITOR) 80 mg tablet Take night before surgery    b complex-vitamin C-folic acid (RENAL) 1 mg Stop taking 7 days prior to surgery.    calcium acetate (PHOSLO) capsule Stop taking 7 days prior to surgery.    carvedilol (COREG) 6.25 mg tablet Take day of surgery.    Cholecalciferol (VITAMIN D3) 1,000 units tablet Stop taking 7 days prior to surgery.    isosorbide mononitrate (IMDUR) 60 mg 24 hr tablet Take day of surgery.    metolazone (ZAROXOLYN) 10 mg tablet Hold day of surgery.    NIFEdipine (PROCARDIA XL) 30 mg 24 hr tablet Take night before surgery    sevelamer (RENAGEL) 800 mg tablet Hold day of surgery.    torsemide (DEMADEX) 100 mg tablet Hold day of surgery.    warfarin (COUMADIN) 10 mg tablet Stop taking 7 days prior to surgery.    LD coumadin 12/15/24 per MD instructions. Medication instructions for day surgery reviewed. Please use only a sip of water to take your instructed medications. Avoid all over the counter vitamins, supplements and NSAIDS for one week prior to surgery per anesthesia guidelines. Tylenol is ok to take as needed.     You will receive a call one business day prior to surgery with an arrival time and hospital directions. If your surgery is scheduled on a Monday, the hospital will be calling you on the Friday prior to your surgery. If you have not heard from anyone by 8pm, please call the hospital supervisor through the hospital  at [phone]. ([place] [phone] or [place] [phone]).    Do not eat or drink anything after midnight the night before your surgery, including candy, mints, lifesavers, or chewing gum. Do not drink alcohol 24hrs before your surgery. Try not to smoke at least 24hrs before your surgery.       Follow the pre surgery showering instructions as listed in the “My Surgical Experience Booklet” or otherwise  provided by your surgeon's office. Do not use a blade to shave the surgical area 1 week before surgery. It is okay to use a clean electric clippers up to 24 hours before surgery. Do not apply any lotions, creams, including makeup, cologne, deodorant, or perfumes after showering on the day of your surgery. Do not use dry shampoo, hair spray, hair gel, or any type of hair products.     No contact lenses, eye make-up, or artificial eyelashes. Remove nail polish, including gel polish, and any artificial, gel, or acrylic nails if possible. Remove all jewelry including rings and body piercing jewelry.     Wear causal clothing that is easy to take on and off. Consider your type of surgery.    Keep any valuables, jewelry, piercings at home. Please bring any specially ordered equipment (sling, braces) if indicated.    Arrange for a responsible person to drive you to and from the hospital on the day of your surgery. Please confirm the visitor policy for the day of your procedure when you receive your phone call with an arrival time.     Call the surgeon's office with any new illnesses, exposures, or additional questions prior to surgery.    Please reference your “My Surgical Experience Booklet” for additional information to prepare for your upcoming surgery.

## 2024-12-18 ENCOUNTER — TELEPHONE (OUTPATIENT)
Age: 44
End: 2024-12-18

## 2024-12-18 NOTE — TELEPHONE ENCOUNTER
Dialysis access was placed by IR and as such a referral should be placed to IR for exchange. Are they allowed to instill TPA into the catheter to try and break up the fibrin sheath?

## 2024-12-18 NOTE — TELEPHONE ENCOUNTER
Pt was last seen on 11/13/2024. He currently has a R subclavian temp catheter and is scheduled for a L arm AVF creation on 12/23/2024 with Dr. James.    Received call from Brian QUINN with [facility] stating she has noticed some resistance on the arterial line on his catheter and is concerned he might have a fibrin sheath forming in the catheter. She is wondering if this could be changed while he is undergoing the fistula procedure on 12/23 or if a new referral would need to be placed for a separate procedure.     Advised will send a message to our triage providers to review and advise.     Clinical: please call facility back at [phone] with the recommendation. Thanks!

## 2024-12-18 NOTE — TELEPHONE ENCOUNTER
Called and s/w Mariela at [facility], she states that she has the Active Ace Cath Flow and they will try that. Explained that they would need to call IR for exchange of cath. Mariela verbalized understanding.

## 2024-12-20 NOTE — PROGRESS NOTES
Telemedicine consent    Patient: Joaquin Frankel  Provider: Brodie Snyder MD  Provider located at [address] [place] [address] [facility] [address] [id number]    The patient was identified by name and date of birth. Joaquin Frankel was informed that this is a telemedicine visit and that the visit is being conducted through the Epic Embedded platform. He agrees to proceed..  My office door was closed. No one else was in the room.  He acknowledged consent and understanding of privacy and security of the video platform. The patient has agreed to participate and understands they can discontinue the visit at any time.    Patient is aware this is a billable service.                                    Hematology Outpatient Office Note    Date of Service: 2024    [facility] [address] [id number] [phone]    Reason for Consultation:   No chief complaint on file.      Referral Physician: Hany Mcfarland DO    Primary Care Physician:  Hany Mcfarland DO     Nickname: Joaquin    Son lives with him, Elver     Original ECO    Today's ECO    Goals and Barriers:  Current Goal: Minimize effects of disease burden, extend life.   Barriers to accomplishing this: None    Patient's Capacity to Self Care:  Patient is able to self care    ASSESSMENT & PLAN      Diagnosis ICD-10-CM Associated Orders   1. MGUS (monoclonal gammopathy of unknown significance)  D47.2 IgG, IgA, IgM     Immunoglobulin free LT chains blood     Protein electrophoresis, urine     Protein, Total and Protein Electrophoresis with Immunofixation     CBC and differential     Comprehensive metabolic panel     IgG, IgA, IgM     Immunoglobulin free LT chains blood     CBC and differential     Comprehensive metabolic panel              This is a 44 y.o. 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    No Recent labs (MGUS not drawn)    Discussion of decision making    I personally reviewed the following lab results, the image studies, pathology, other specialty/physicians consult notes and recommendations, and outside medical records. I had a lengthy discussion with the patient and shared the work-up findings. We discussed the diagnosis and management plan as below. I spent 31 minutes reviewing the records (labs, clinician notes, outside records, medical history, monitoring of anti-neoplastic toxicities, ordering medicine/tests/procedures, interpreting the imaging/labs previously done) and coordination of care as well as direct time with the patient today, of which greater than 50% of the time was spent in counseling and coordination of care with the patient/family.    Plan/Labs  As this is MGUS and not MRG, would recommend repeat sFLC, Ig, SPEP/JOSE G in 12 months   F/u nephrology for MWF dialysis  July 19 biopsy from [place] is reassuring for only Nodular diabetic glomerulosclerosis        Follow Up:  December 2025 as light chain involvement of the kidney was ruled out    All questions were answered to the patient's satisfaction during this encounter. The patient knows the contact information for our office and knows to reach out for any relevant concerns related to this encounter. They are to call for any temperature 100.4 or higher, new symptoms including but not restricted to shaking chills, decreased appetite, nausea, vomiting, diarrhea, increased fatigue, shortness of breath or chest pain, confusion,  and not feeling the strength to come to the clinic. For all other listed problems and medical diagnosis in their chart - they are managed by PCP and/or other specialists, which the patient acknowledges. Thank you very much for your consultation and making us a part of this patient's care. We are continuing to follow closely with you. Please do not hesitate to reach out to me with any additional questions or concerns.    Brodie Snyder MD  Hematology & Medical Oncology Staff Physician             Disclaimer: This document was prepared using [employer] Direct technology. If a word or phrase is confusing, or does not make sense, this is likely due to recognition error which was not discovered during this clinician's review. If you believe an error has occurred, please contact me through HemOn service line for sun?cation.      HEMATOLOGICAL HISTORY OF PRESENT ILLNESS      Clotting History    Bleeding History    Cancer History    Family Cancer History    H/O Blood/Plt Transfusion    Tobacco/etoh/drug abuse            Occupation        2/17/2024: creat 1.22  4/29/2024: creat 2.98  6/3/2024: creat 6.5, K/L 1.37, SPEP 0.26 g/dL IgG Kappa, Hgb 9, MCV 85, plt 327k  CRRT started 6/7/2024 6/9/2024: Hgb 6.7, MCV 89, plt 281k        6/12/2024: BMBx without increased plasma cells    No definitive morphologic or immunophenotypic evidence of plasma cell neoplasm, see comment.  -  FANCD2 mutation of uncertain significance (VAF: 52.5%), see comment.  -  Normocellular bone marrow (60-70% cellularity) with myeloid predominant trilineage maturing hematopoiesis, mild megakaryocytic hyperplasia, and no increase in blasts.  7/4/2024 CTA PE: subsegmental right lower lobe pulmonary embolism  7/22/2024: creat 10.9, WBC 5.69k, Hgb 9.2, plt 184k, MCV 94  7/24/2024: creat 9.46    No new issues, doing well    SUBJECTIVE  (INTERVAL HISTORY)      Energy is intermittent depending on the dialysis that he gets MWF.     I have reviewed the  "relevant past medical, surgical, social and family history. I have also reviewed allergies and medications for this patient.      ROS:    Baseline weight; 360-365 lbs    Denies F/C, night sweats, N/V, SOB, CP, LH, HA, rash, itching, gen weakness, melena, hematuria, hematochezia, falls, diarrhea, or constipation     A 10-point review of system was performed, pertinent positive and negative were detailed as above. Otherwise, the 10-point review of system was negative.      Past Medical History:   Diagnosis Date    Acute hypoxic respiratory failure (HCC) 10/07/2023    Arthritis     Breathing difficulty     Cellulitis 10/07/2023    Chronic kidney disease     end stage renal disease    Coronary artery disease     Diabetes mellitus ([facility])     Diabetic neuropathy ([facility]) 05/28/2021    Heart disease     CAD   s/p ptca with 1 stent 2012    Hidradenitis suppurativa     groin    History of transfusion     Hyperlipidemia     Hypertension     MI (myocardial infarction) ([facility])     \" silent \" M.I. in the past    Morbid obesity with BMI of 50.0-59.9, adult ([facility])     Nicotine dependence     Obesity     PE (pulmonary thromboembolism) ([facility]) 06/2024    small on left side, takes coumadin    Pilonidal cyst     Type 1 non-ST elevation myocardial infarction (NSTEMI) ([facility]) 11/29/2020       Past Surgical History:   Procedure Laterality Date    CARDIAC SURGERY      CORONARY ANGIOPLASTY WITH STENT PLACEMENT      x2    INCISION AND DRAINAGE OF WOUND N/A 01/24/2017    Procedure: INCISION AND DRAINAGE (I&D) BUTTOCK, PILONIDAL CYST;  Surgeon: Simba Adhikari MD;  Location: [facility];  Service:     IR BIOPSY BONE MARROW  06/12/2024    IR BIOPSY KIDNEY RANDOM  07/12/2024    IR BIOPSY KIDNEY RANDOM  07/19/2024    IR TEMPORARY DIALYSIS CATHETER PLACEMENT  06/06/2024    IR TUNNELED CENTRAL LINE PLACEMENT  06/14/2024    LEG SURGERY Left     I&D of left leg 2012. Had resp failure and had to be intubated during procedure.    NE ARTERIOVENOUS ANASTOMOSIS OPEN " DIRECT Left 09/26/2024    Procedure: left arm radio-cephalic AVF creation;  Surgeon: Scout James DO;  Location: [facility];  Service: Vascular    RI ARTERIOVENOUS ANASTOMOSIS OPEN DIRECT Left 12/23/2024    Procedure: left arm brachio-basilic AVF creation;  Surgeon: Scout James DO;  Location: [facility];  Service: Vascular    RI DEBRIDEMENT SUBCUTANEOUS TISSUE 1ST 20 SQ CM/< Left 07/06/2021    Procedure: DEBRIDEMENT WOUND (WASH OUT);  Surgeon: Sudheer Mcfarlane MD;  [facility]: [facility];  Service: General    RI EXC B9 LESION MRGN XCP SK TG T/A/L >4.0 CM Left 04/06/2021    Procedure: EXCISION THIGH MASS;  Surgeon: Sudheer Mcfarlane MD;  [facility];  Service: General    RI EXCISION H/P/P/U COMPLEX REPAIR Left 07/06/2021    Procedure: EXCISION PERINEAL ABSCESS LEFT THIGH;  Surgeon: Sudheer Mfcarlane MD;  [facility];  Service: General    RI NEGATIVE PRESSURE WOUND THERAPY DME <= 50 SQ CM Left 04/06/2021    Procedure: APPLICATION VAC DRESSING THIGH;  Surgeon: Sudheer Mcfarlane MD;  [facility];  Service: General    WOUND DEBRIDEMENT Left 04/17/2021    Procedure: DEBRIDEMENT WOUND AND DRESSING CHANGE (WASH OUT);  Surgeon: Mahin Traore DO;  [facility];  Service: General    WOUND DEBRIDEMENT Left 04/22/2021    Procedure: DEBRIDEMENT LOWER EXTREMITY (WASH OUT), left groin and thigh;  Surgeon: Sudheer Mcfarlane MD;  [facility];  Service: General    WOUND DEBRIDEMENT Left 05/26/2021    Procedure: DEBRIDEMENT LOWER EXTREMITY (WASH OUT);  Surgeon: Zak Castanon DO;  [facility];  Service: General    WOUND DEBRIDEMENT Left 05/28/2021    Procedure: Washout left thigh wound and closure;  Surgeon: Amor Jaramillo DO;  [facility];  Service: General       Family History   Problem Relation Age of Onset    Heart disease Father     Diabetes Father     Hypertension Mother     Heart disease Mother        Social History     Socioeconomic History    Marital status: Single     Spouse name:  Not on file    Number of children: 1    Years of education: 12    Highest education level: High school graduate   Occupational History    Not on file   Tobacco Use    Smoking status: Former     Current packs/day: 0.00     Average packs/day: 1.5 packs/day for 20.0 years (29.9 ttl pk-yrs)     Types: Cigarettes     Start date: 01/2021     Quit date: 03/2021     Years since quitting: 3.8     Passive exposure: Past    Smokeless tobacco: Never   Vaping Use    Vaping status: Never Used   Substance and Sexual Activity    Alcohol use: Not Currently     Comment: rarely    Drug use: No    Sexual activity: Not Currently     Partners: Female   Other Topics Concern    Not on file   Social History Narrative    Not on file     Social Drivers of Health     Financial Resource Strain: Not on file   Food Insecurity: No Food Insecurity (7/8/2024)    Nursing - Inadequate Food Risk Classification     Worried About Running Out of Food in the Last Year: Never true     Ran Out of Food in the Last Year: Never true     Ran Out of Food in the Last Year: Not on file   Transportation Needs: No Transportation Needs (7/8/2024)    PRAPARE - Transportation     Lack of Transportation (Medical): No     Lack of Transportation (Non-Medical): No   Physical Activity: Not on file   Stress: Not on file   Social Connections: Not on file   Intimate Partner Violence: Not on file   Housing Stability: Low Risk  (7/8/2024)    Housing Stability Vital Sign     Unable to Pay for Housing in the Last Year: No     Number of Times Moved in the Last Year: 0     Homeless in the Last Year: No       Allergies   Allergen Reactions    Keflex [Cephalexin] Facial Swelling and Lip Swelling    Amoxicillin Hives     childhood       Current Outpatient Medications   Medication Sig Dispense Refill    aspirin (ECOTRIN LOW STRENGTH) 81 mg EC tablet Take 1 tablet (81 mg total) by mouth daily 90 tablet 2    atorvastatin (LIPITOR) 80 mg tablet Take 1 tablet (80 mg total) by mouth daily 90  tablet 3    b complex-vitamin C-folic acid (RENAL) 1 mg Take 1 capsule by mouth daily with dinner 30 capsule 8    calcium acetate (PHOSLO) capsule Take 2 capsules (1,334 mg total) by mouth 3 (three) times a day 540 capsule 1    carvedilol (COREG) 6.25 mg tablet Take 1 tablet (6.25 mg total) by mouth 2 (two) times a day with meals 180 tablet 1    Cholecalciferol (VITAMIN D3) 1,000 units tablet Take 2 tablets (2,000 Units total) by mouth daily 60 tablet 5    HYDROcodone-acetaminophen (Norco) 5-325 mg per tablet Take 1 tablet by mouth every 6 (six) hours as needed for pain for up to 10 days Max Daily Amount: 4 tablets 20 tablet 0    isosorbide mononitrate (IMDUR) 60 mg 24 hr tablet Take 1 tablet (60 mg total) by mouth daily 90 tablet 2    metolazone (ZAROXOLYN) 10 mg tablet Take 1 tablet (10 mg total) by mouth 4 (four) times a week (Patient taking differently: Take 10 mg by mouth 2 (two) times a week) 50 tablet 1    NIFEdipine (PROCARDIA XL) 30 mg 24 hr tablet Take 1 tablet (30 mg total) by mouth daily at bedtime 90 tablet 1    sevelamer (RENAGEL) 800 mg tablet Take 1 tablet (800 mg total) by mouth 3 (three) times a day with meals 540 tablet 1    torsemide (DEMADEX) 100 mg tablet Take 1 tablet (100 mg total) by mouth 4 (four) times a week 50 tablet 1    warfarin (COUMADIN) 10 mg tablet Take 1.5 tablets (15 mg total) by mouth daily 135 tablet 5     No current facility-administered medications for this visit.       (Not in a hospital admission)        Objective:     24 Hour Vitals Assessment:     There were no vitals filed for this visit.      PHYSICIAN EXAM:    General: Appearance: alert, cooperative, no distress.  HEENT: Normocephalic, atraumatic. No scleral icterus. conjunctivae clear. EOMI.  Chest: No tenderness to palpation. No open wound noted.  Lungs: Clear to auscultation bilaterally, Respirations unlabored.  Cardiac: Regular rate, +S1and S2  Abdomen: Soft, non-tender, non-distended. Bowel sounds are normal.    Extremities:  No edema, cyanosis, clubbing.  Skin: Skin color, turgor are normal. No rashes.  Lymphatics: no palpable supra-cervical, axillary, or inguinal adenopathy  Neurologic: Awake, Alert, and oriented, no gross focal deficits noted b/l.       DATA REVIEW:    Pathology Result:    Final Diagnosis   Date Value Ref Range Status   07/19/2024   Final    A. Kidney, Left:  - As per gross description.        06/12/2024   Final    A -C.  Bone marrow,  left iliac crest,  biopsy, clot, peripheral blood, and aspirate:  -  No definitive morphologic or immunophenotypic evidence of plasma cell neoplasm, see comment.  -  FANCD2 mutation of uncertain significance (VAF: 52.5%), see comment.  -  Normocellular bone marrow (60-70% cellularity) with myeloid predominant trilineage maturing hematopoiesis, mild megakaryocytic hyperplasia, and no increase in blasts.   -  Adequate iron stores.  -  No significance increase in reticulin fibrosis.    Comment: The patient's history of anemia and recently identified monoclonal protein is noted.   The current bone marrow biopsy shows a polyclonal increase in plasma cells with no evidence of light chain restriction by kappa/lambda-MJ. Additionally flow cytometry is negative for a clonal plasma cell population. In the setting of a circulating monoclonal protein, the possibility of a small clonal plasma cell population arising in a background of polyclonal plasma cells cannot be entirely excluded. Therefore, continued follow-up and monitoring of the peripheral blood by serum protein electrophoresis with heavy and light analysis is advised.    Evaluation of the myeloid lineage is within normal limits at this time. Flow cytometry is negative for myeloid abnormalities. Karyotype analysis identifies a normal male complement and Next generation sequencing is negative for clinically significant mutations.    Finally, Next generation sequencing identifies a FANCD2 mutation of unclear significance  (VAF:52.5%). FANC mutations if germline are known to be associated with cancer predisposition syndrome. Therefore, genetic counseling consultation is advised to exclude the possibility of a germline mutation. Clinical correlation is advised.    Dr. Fernández is notified of the findings by Dr. Fabian via [employer] Secure Chat on 6/27/24.     05/16/2024   Final    A. Skin, Cyst/Tag/Debridement, Sebaceous cyst right side of scalp, excision:  - Epidermal (infundibular) cyst.          07/06/2021   Final    A. Skin, Debridement, left groin:  - Portion of skin with abscess surrounded by acute and chronically inflamed granulation tissue and     scar. Correlate with definitive culture results, if available.    -- Scattered foreign body giant cells; no polarizable material.  - Negative for malignancy.         04/06/2021   Final    A. Skin, Left Thigh (Mass), Excision:  - Skin with underlying abscess, reactive changes, and sinus tract formation.            Image Results:   Image result are reviewed and documented in Hematology/Oncology history. I personally reviewed these images.    VAS lower limb venous duplex study, unilateral/limited     [facility] REPORT  CLINICAL:  Indications:  Patient presents with left lower extremity pain x 6 days.  Operative History:  2024-09-26 Left proximal radiocephalic fistula placement  2024-06-14 Right Tunneled central line  Coronary angioplasty with stent placement  Risk Factors:  The patient has history of Obesity, HTN, Diabetes (NIDDM (oral meds)),  Hyperlipidemia, CAD and previous smoking (quit 1-5yrs ago).        CONCLUSION:     Impression:  RIGHT LOWER LIMB LIMITED:  Evaluation shows no gross evidence of thrombus in the common femoral vein.  Doppler evaluation shows a normal response to augmentation maneuvers.     LEFT LOWER LIMB:  No gross evidence of acute or chronic deep vein thrombosis  No gross evidence of superficial thrombophlebitis noted.  Doppler evaluation shows a normal  "response to augmentation maneuvers.  Popliteal, posterior tibial and anterior tibial arterial Doppler waveforms are  triphasic/hyperemic.  Tech Note:  There is an echogenic structure located in the inguinal region measuring  approximately 3.4 cm suggestive of enlarged lymphatic channels.     Technical findings were given to Warren Mcfarlane in person at 16:40.     SIGNATURE:  Electronically Signed by: MARIE SCHUMACHER DO, RPVI on 2024-11-22 12:59:47 PM      LABS:  Lab data are reviewed and documented in HemOnc history.       Lab Results   Component Value Date    HGB 8.1 (L) 12/23/2024    HCT 23.7 (L) 12/23/2024    MCV 95 12/23/2024     12/23/2024    WBC 9.56 12/23/2024    NRBC 0 12/23/2024    BANDSPCT 1 11/21/2024    ATYLMPCT 2 (H) 01/30/2024     Lab Results   Component Value Date     12/29/2016    K 4.4 11/21/2024    CL 95 (L) 11/21/2024    CO2 28 11/21/2024    BUN 70 (H) 11/21/2024    CREATININE 9.84 (H) 11/21/2024    GLUCOSE 135 05/26/2021    GLUF 93 09/11/2024    CALCIUM 8.3 (L) 11/21/2024    CORRECTEDCA 7.9 (L) 11/16/2024    AST 20 11/21/2024    ALT 18 11/21/2024    ALKPHOS 110 (H) 11/21/2024    PROT 6.9 12/29/2016    BILITOT 0.3 12/29/2016    EGFR 5 11/21/2024       Lab Results   Component Value Date    IRON 38 (L) 06/04/2024    TIBC 226 (L) 06/04/2024    FERRITIN 69 06/04/2024    FERRITIN 38 04/01/2022       Lab Results   Component Value Date    ZRDURRHI01 310 06/04/2024       No results for input(s): \"WBC\", \"CREAT\", \"PLT\" in the last 72 hours.       By:  Brodie Snyder MD, 12/30/2024, 1:36 PM                                  "

## 2024-12-23 ENCOUNTER — ANESTHESIA EVENT (OUTPATIENT)
Dept: ANESTHESIOLOGY | Facility: HOSPITAL | Age: 44
End: 2024-12-23

## 2024-12-23 ENCOUNTER — ANESTHESIA (OUTPATIENT)
Dept: PERIOP | Facility: HOSPITAL | Age: 44
End: 2024-12-23
Payer: COMMERCIAL

## 2024-12-23 ENCOUNTER — HOSPITAL ENCOUNTER (OUTPATIENT)
Facility: HOSPITAL | Age: 44
Setting detail: OUTPATIENT SURGERY
Discharge: HOME | End: 2024-12-23
Attending: SURGERY | Admitting: SURGERY
Payer: COMMERCIAL

## 2024-12-23 ENCOUNTER — ANESTHESIA (OUTPATIENT)
Dept: ANESTHESIOLOGY | Facility: HOSPITAL | Age: 44
End: 2024-12-23

## 2024-12-23 VITALS
TEMPERATURE: 97.6 F | SYSTOLIC BLOOD PRESSURE: 131 MMHG | DIASTOLIC BLOOD PRESSURE: 68 MMHG | OXYGEN SATURATION: 96 % | HEART RATE: 65 BPM | HEIGHT: 68 IN | RESPIRATION RATE: 16 BRPM | WEIGHT: 315 LBS | BODY MASS INDEX: 47.74 KG/M2

## 2024-12-23 DIAGNOSIS — Z99.2 ESRD (END STAGE RENAL DISEASE) ON DIALYSIS (HCC): Primary | ICD-10-CM

## 2024-12-23 DIAGNOSIS — N18.6 ESRD (END STAGE RENAL DISEASE) ON DIALYSIS (HCC): Primary | ICD-10-CM

## 2024-12-23 LAB
BASOPHILS # BLD AUTO: 0.07 THOUSANDS/ÂΜL (ref 0–0.1)
BASOPHILS NFR BLD AUTO: 1 % (ref 0–1)
EOSINOPHIL # BLD AUTO: 0.43 THOUSAND/ÂΜL (ref 0–0.61)
EOSINOPHIL NFR BLD AUTO: 5 % (ref 0–6)
ERYTHROCYTE [DISTWIDTH] IN BLOOD BY AUTOMATED COUNT: 12.1 % (ref 11.6–15.1)
GLUCOSE SERPL-MCNC: 130 MG/DL (ref 65–140)
GLUCOSE SERPL-MCNC: 149 MG/DL (ref 65–140)
HCT VFR BLD AUTO: 23.7 % (ref 36.5–49.3)
HGB BLD-MCNC: 8.1 G/DL (ref 12–17)
IMM GRANULOCYTES # BLD AUTO: 0.07 THOUSAND/UL (ref 0–0.2)
IMM GRANULOCYTES NFR BLD AUTO: 1 % (ref 0–2)
LYMPHOCYTES # BLD AUTO: 1.48 THOUSANDS/ÂΜL (ref 0.6–4.47)
LYMPHOCYTES NFR BLD AUTO: 16 % (ref 14–44)
MCH RBC QN AUTO: 32.5 PG (ref 26.8–34.3)
MCHC RBC AUTO-ENTMCNC: 34.2 G/DL (ref 31.4–37.4)
MCV RBC AUTO: 95 FL (ref 82–98)
MONOCYTES # BLD AUTO: 0.66 THOUSAND/ÂΜL (ref 0.17–1.22)
MONOCYTES NFR BLD AUTO: 7 % (ref 4–12)
NEUTROPHILS # BLD AUTO: 6.85 THOUSANDS/ÂΜL (ref 1.85–7.62)
NEUTS SEG NFR BLD AUTO: 70 % (ref 43–75)
NRBC BLD AUTO-RTO: 0 /100 WBCS
PLATELET # BLD AUTO: 252 THOUSANDS/UL (ref 149–390)
PMV BLD AUTO: 9.2 FL (ref 8.9–12.7)
RBC # BLD AUTO: 2.49 MILLION/UL (ref 3.88–5.62)
WBC # BLD AUTO: 9.56 THOUSAND/UL (ref 4.31–10.16)

## 2024-12-23 PROCEDURE — 36821 AV FUSION DIRECT ANY SITE: CPT | Performed by: PHYSICIAN ASSISTANT

## 2024-12-23 PROCEDURE — 85025 COMPLETE CBC W/AUTO DIFF WBC: CPT | Performed by: ANESTHESIOLOGY

## 2024-12-23 PROCEDURE — NC001 PR NO CHARGE: Performed by: SURGERY

## 2024-12-23 PROCEDURE — 36821 AV FUSION DIRECT ANY SITE: CPT | Performed by: SURGERY

## 2024-12-23 PROCEDURE — 82948 REAGENT STRIP/BLOOD GLUCOSE: CPT

## 2024-12-23 RX ORDER — DEXAMETHASONE SODIUM PHOSPHATE 10 MG/ML
INJECTION, SOLUTION INTRAMUSCULAR; INTRAVENOUS AS NEEDED
Status: DISCONTINUED | OUTPATIENT
Start: 2024-12-23 | End: 2024-12-23

## 2024-12-23 RX ORDER — ONDANSETRON 2 MG/ML
INJECTION INTRAMUSCULAR; INTRAVENOUS AS NEEDED
Status: DISCONTINUED | OUTPATIENT
Start: 2024-12-23 | End: 2024-12-23

## 2024-12-23 RX ORDER — HYDROMORPHONE HCL IN WATER/PF 6 MG/30 ML
0.2 PATIENT CONTROLLED ANALGESIA SYRINGE INTRAVENOUS
Status: DISCONTINUED | OUTPATIENT
Start: 2024-12-23 | End: 2024-12-23 | Stop reason: HOSPADM

## 2024-12-23 RX ORDER — EPHEDRINE SULFATE 50 MG/ML
INJECTION INTRAVENOUS AS NEEDED
Status: DISCONTINUED | OUTPATIENT
Start: 2024-12-23 | End: 2024-12-23

## 2024-12-23 RX ORDER — PROPOFOL 10 MG/ML
INJECTION, EMULSION INTRAVENOUS AS NEEDED
Status: DISCONTINUED | OUTPATIENT
Start: 2024-12-23 | End: 2024-12-23

## 2024-12-23 RX ORDER — HYDROCODONE BITARTRATE AND ACETAMINOPHEN 5; 325 MG/1; MG/1
1 TABLET ORAL EVERY 6 HOURS PRN
Qty: 20 TABLET | Refills: 0 | Status: SHIPPED | OUTPATIENT
Start: 2024-12-23 | End: 2025-01-02

## 2024-12-23 RX ORDER — GLYCOPYRROLATE 0.2 MG/ML
INJECTION INTRAMUSCULAR; INTRAVENOUS AS NEEDED
Status: DISCONTINUED | OUTPATIENT
Start: 2024-12-23 | End: 2024-12-23

## 2024-12-23 RX ORDER — MIDAZOLAM HYDROCHLORIDE 2 MG/2ML
INJECTION, SOLUTION INTRAMUSCULAR; INTRAVENOUS AS NEEDED
Status: DISCONTINUED | OUTPATIENT
Start: 2024-12-23 | End: 2024-12-23

## 2024-12-23 RX ORDER — LIDOCAINE HYDROCHLORIDE 10 MG/ML
INJECTION, SOLUTION EPIDURAL; INFILTRATION; INTRACAUDAL; PERINEURAL AS NEEDED
Status: DISCONTINUED | OUTPATIENT
Start: 2024-12-23 | End: 2024-12-23

## 2024-12-23 RX ORDER — FENTANYL CITRATE/PF 50 MCG/ML
50 SYRINGE (ML) INJECTION
Status: DISCONTINUED | OUTPATIENT
Start: 2024-12-23 | End: 2024-12-23 | Stop reason: HOSPADM

## 2024-12-23 RX ORDER — ROCURONIUM BROMIDE 10 MG/ML
INJECTION, SOLUTION INTRAVENOUS AS NEEDED
Status: DISCONTINUED | OUTPATIENT
Start: 2024-12-23 | End: 2024-12-23

## 2024-12-23 RX ORDER — ONDANSETRON 2 MG/ML
4 INJECTION INTRAMUSCULAR; INTRAVENOUS ONCE AS NEEDED
Status: COMPLETED | OUTPATIENT
Start: 2024-12-23 | End: 2024-12-23

## 2024-12-23 RX ORDER — SODIUM CHLORIDE 9 MG/ML
INJECTION, SOLUTION INTRAVENOUS AS NEEDED
Status: DISCONTINUED | OUTPATIENT
Start: 2024-12-23 | End: 2024-12-23 | Stop reason: HOSPADM

## 2024-12-23 RX ORDER — FENTANYL CITRATE 50 UG/ML
INJECTION, SOLUTION INTRAMUSCULAR; INTRAVENOUS AS NEEDED
Status: DISCONTINUED | OUTPATIENT
Start: 2024-12-23 | End: 2024-12-23

## 2024-12-23 RX ORDER — HYDROCODONE BITARTRATE AND ACETAMINOPHEN 5; 325 MG/1; MG/1
1 TABLET ORAL ONCE
Refills: 0 | Status: COMPLETED | OUTPATIENT
Start: 2024-12-23 | End: 2024-12-23

## 2024-12-23 RX ORDER — SODIUM CHLORIDE 9 MG/ML
30 INJECTION, SOLUTION INTRAVENOUS CONTINUOUS
Status: DISCONTINUED | OUTPATIENT
Start: 2024-12-23 | End: 2024-12-23 | Stop reason: HOSPADM

## 2024-12-23 RX ADMIN — SUGAMMADEX 100 MG: 100 INJECTION, SOLUTION INTRAVENOUS at 09:15

## 2024-12-23 RX ADMIN — FENTANYL CITRATE 50 MCG: 50 INJECTION INTRAMUSCULAR; INTRAVENOUS at 10:07

## 2024-12-23 RX ADMIN — MIDAZOLAM HYDROCHLORIDE 2 MG: 1 INJECTION, SOLUTION INTRAMUSCULAR; INTRAVENOUS at 07:34

## 2024-12-23 RX ADMIN — SODIUM CHLORIDE 30 ML/HR: 0.9 INJECTION, SOLUTION INTRAVENOUS at 07:11

## 2024-12-23 RX ADMIN — PROPOFOL 100 MG: 10 INJECTION, EMULSION INTRAVENOUS at 07:45

## 2024-12-23 RX ADMIN — LIDOCAINE HYDROCHLORIDE 50 MG: 10 INJECTION, SOLUTION EPIDURAL; INFILTRATION; INTRACAUDAL; PERINEURAL at 07:43

## 2024-12-23 RX ADMIN — FENTANYL CITRATE 50 MCG: 50 INJECTION INTRAMUSCULAR; INTRAVENOUS at 10:37

## 2024-12-23 RX ADMIN — SUGAMMADEX 200 MG: 100 INJECTION, SOLUTION INTRAVENOUS at 09:20

## 2024-12-23 RX ADMIN — ROCURONIUM BROMIDE 20 MG: 10 INJECTION, SOLUTION INTRAVENOUS at 08:06

## 2024-12-23 RX ADMIN — PROPOFOL 50 MG: 10 INJECTION, EMULSION INTRAVENOUS at 07:44

## 2024-12-23 RX ADMIN — SUGAMMADEX 100 MG: 100 INJECTION, SOLUTION INTRAVENOUS at 09:19

## 2024-12-23 RX ADMIN — SUGAMMADEX 200 MG: 100 INJECTION, SOLUTION INTRAVENOUS at 09:24

## 2024-12-23 RX ADMIN — EPHEDRINE SULFATE 5 MG: 50 INJECTION, SOLUTION INTRAVENOUS at 08:38

## 2024-12-23 RX ADMIN — FENTANYL CITRATE 100 MCG: 50 INJECTION INTRAMUSCULAR; INTRAVENOUS at 07:42

## 2024-12-23 RX ADMIN — DEXAMETHASONE SODIUM PHOSPHATE 5 MG: 10 INJECTION, SOLUTION INTRAMUSCULAR; INTRAVENOUS at 07:45

## 2024-12-23 RX ADMIN — ONDANSETRON 4 MG: 2 INJECTION INTRAMUSCULAR; INTRAVENOUS at 07:45

## 2024-12-23 RX ADMIN — EPHEDRINE SULFATE 10 MG: 50 INJECTION, SOLUTION INTRAVENOUS at 08:21

## 2024-12-23 RX ADMIN — HYDROCODONE BITARTRATE AND ACETAMINOPHEN 1 TABLET: 5; 325 TABLET ORAL at 11:33

## 2024-12-23 RX ADMIN — EPHEDRINE SULFATE 10 MG: 50 INJECTION, SOLUTION INTRAVENOUS at 08:08

## 2024-12-23 RX ADMIN — FENTANYL CITRATE 50 MCG: 50 INJECTION INTRAMUSCULAR; INTRAVENOUS at 10:19

## 2024-12-23 RX ADMIN — PROPOFOL 200 MG: 10 INJECTION, EMULSION INTRAVENOUS at 07:43

## 2024-12-23 RX ADMIN — GLYCOPYRROLATE 0.2 MG: 0.2 INJECTION, SOLUTION INTRAMUSCULAR; INTRAVENOUS at 08:02

## 2024-12-23 RX ADMIN — VANCOMYCIN HYDROCHLORIDE 2000 MG: 1 INJECTION, POWDER, LYOPHILIZED, FOR SOLUTION INTRAVENOUS at 07:50

## 2024-12-23 RX ADMIN — ROCURONIUM BROMIDE 50 MG: 10 INJECTION, SOLUTION INTRAVENOUS at 07:43

## 2024-12-23 RX ADMIN — ONDANSETRON 4 MG: 2 INJECTION INTRAMUSCULAR; INTRAVENOUS at 11:00

## 2024-12-23 NOTE — ANESTHESIA POSTPROCEDURE EVALUATION
Post-Op Assessment Note    CV Status:  Stable  Pain Score: 1    Pain management: adequate       Mental Status:  Alert and awake   Hydration Status:  Euvolemic   PONV Controlled:  Controlled   Airway Patency:  Patent     Post Op Vitals Reviewed: Yes    No anethesia notable event occurred.    Staff: Anesthesiologist           Last Filed PACU Vitals:  Vitals Value Taken Time   Temp 97.6 °F (36.4 °C) 12/23/24 0933   Pulse 70 12/23/24 1003   /72 12/23/24 1003   Resp 13 12/23/24 1003   SpO2 97 % 12/23/24 1003       Modified Zachary:  Activity: 2 (12/23/2024 10:03 AM)  Respiration: 2 (12/23/2024 10:03 AM)  Circulation: 2 (12/23/2024 10:03 AM)  Consciousness: 2 (12/23/2024 10:03 AM)  Oxygen Saturation: 1 (12/23/2024 10:03 AM)  Modified Zachary Score: 9 (12/23/2024 10:03 AM)

## 2024-12-23 NOTE — NURSING NOTE
1111: Pt to be D/C'd from PACU. Pt in Phase 11. Call bell within reach, side rails up, beverage at bedside. Report given to CHEYENNE Hastings

## 2024-12-23 NOTE — DISCHARGE INSTR - AVS FIRST PAGE
DISCHARGE INSTRUCTIONS  DIALYSIS FISTULA SURGERY    ACTIVITY:  Limit use of the operated arm to what is necessary for the first day after surgery. On the second day after surgery, you may start to increase use of your arm as tolerated.  Avoid heavy lifting (no more than 15 lbs) for the first one week.  You should start to exercise your hand on the side of the fistula by squeezing a stress ball or a rolled-up sock. This increases blood flow in your fistula and arm so your fistula will function better.    Feel for a thrill every day. The thrill is the vibration or pulse you feel over the fistula that means the blood is flowing through it. If you cannot feel a thrill, call our office ([phone]).    DIET:   Resume your normal diet.  Good nutrition is important for healing of your incision.    DRESSING:   You may have surgical glue at your surgical site.  There are stitches present under the skin which will absorb on their own.  The glue is used to cover the incision, assist in closure, and prevent contamination. This adhesive will darken and peel away on its own within one to two weeks. Do not pick at it.  If you have a dressing over your surgical site, remove this on the second day after surgery.      INCISION:   If you do not have a dialysis catheter in place, you may shower and get your incision wet.  Wash incision daily with soap and water, but do not rub or scrub the incision; rinse thoroughly and pat dry.  You may have stitches or staples to close your incision and it is okay for these to get wet.  Do not bathe in a tub or swim for the first 4 week following surgery or if you have any open wounds.  It is normal to have mild swelling or discoloration around the incision.   If increasing redness or pain develops, call our office immediately.  Numbness in the region of the incision may occur following the surgery.  This normally improves over six to twelve months.  If you have numbness or pain in your hand,  please call our office immediately.  DO NOT put any powders, creams, ointments, or lotions on your incision.     ARM SWELLING:    Most patients have some noticeable arm swelling after surgery.  This usually disappears within a few weeks.  If swelling is present, elevate the arm whenever possible.      RESTRICTIONS:   Do NOT have blood draws, IV's, or blood pressures performed on the operated arm.    FISTULA USE:    Your fistula will not be used until it has fully matured - approximately 6 to 12 weeks. If you are using a catheter for dialysis, this will not be removed until after your fistula has matured and is being used for dialysis without any issues.    FOLLOW UP STUDIES:  A Doppler ultrasound will be performed about 5-6 weeks after surgery.  Your surgeon will arrange this at your first postoperative visit.     FOLLOW UP APPOINTMENTS:  Making and keeping follow up appointments and ultrasound tests are important to your recovery.  If you have difficulty making it to or keeping your follow up appointments, call the office.    If you have increased pain, fever >101.5, increased drainage, redness or a bad smell at your surgery site, new coldness/numbness of your arm or leg, please call us immediately and GO directly to the ER.    PLEASE CALL THE OFFICE IF YOU HAVE ANY QUESTIONS  [phone]  [phone] [address], [address], [place] [address]  [address] [place] [address]  [address] [place] [address]  [address] [facility], [place] [address]  [address] [facility], [address], [place] [address]  [address] [facility], [address], [place] [address]  [address] [facility], Entrance A, 2nd Floor, [place] [address]  [address] [facility], 1st Floor, [address], [place] [address]  [address] [place] [address]  [address] [facility], [address], [place] [address]

## 2024-12-23 NOTE — H&P
H&P - Vascular Surgery   Name: Joaquin Frankel 44 y.o. male I MRN: [id number]  Unit/Bed#: OR POOL I Date of Admission: 12/23/2024   Date of Service: 12/23/2024 I Hospital Day: 0     Assessment & Plan    ESRD      Left arm access creation  History of Present Illness   Joaquin Frankel is a 44 y.o. male  No changes since last visit    Review of Systems  I have reviewed the patient's PMH, PSH, Social History, Family History, Meds, and Allergies    Objective :  Temp:  [98.5 °F (36.9 °C)] 98.5 °F (36.9 °C)  HR:  [67] 67  BP: (127)/(72) 127/72  Resp:  [20] 20  SpO2:  [97 %] 97 %  O2 Device: None (Room air)    I/O       None            Physical Exam   RRR  CTAB   Ab soft, nt  LE warm      Lab Results: I have reviewed the following results:  Recent Labs     12/22/24  0900   HGB 8.2*             VTE Prophylaxis:

## 2024-12-23 NOTE — ANESTHESIA POSTPROCEDURE EVALUATION
Post-Op Assessment Note    CV Status:  Stable  Pain Score: 0    Pain management: adequate       Mental Status:  Awake and sleepy   Hydration Status:  Euvolemic   PONV Controlled:  Controlled   Airway Patency:  Patent  Two or more mitigation strategies used for obstructive sleep apnea   Post Op Vitals Reviewed: Yes    No anethesia notable event occurred.    Staff: CRNA           Last Filed PACU Vitals:  Vitals Value Taken Time   Temp     Pulse 73    /66    Resp 14    SpO2 95%        Modified Zachary:  No data recorded

## 2024-12-23 NOTE — OP NOTE
OPERATIVE REPORT  PATIENT NAME: Joaquin Frankel    :  1980  MRN: [id number]  Pt Location: [place] OR ROOM 03    SURGERY DATE: 2024    Surgeons and Role:     * Scout James DO - Primary     * Marco Morgan PA-C - Assisting there is no qualified available [facility] resident or fellow for the procedure    Preop Diagnosis:  ESRD (end stage renal disease) on dialysis (HCC) [N18.6, Z99.2]    Post-Op Diagnosis Codes:     * ESRD (end stage renal disease) on dialysis (HCC) [N18.6, Z99.2]    Procedure(s):  Left - left arm brachio-basilic AVF creation    Specimen(s):  * No specimens in log *    Estimated Blood Loss:   Minimal    Anesthesia Type:   General plus local anesthetic    Operative Indications:  ESRD (end stage renal disease) on dialysis (HCC) [N18.6, Z99.2]  44-year-old male on hemodialysis prior right proximal radiocephalic fistula created by myself in September this failed to mature.  I recommended left arm brachiobasilic AV fistula creation and after discussing all risks benefits and alternative therapies he consented to proceed      Operative Findings:    Preoperative IntraOp vein mapping demonstrated adequate caliber basilic vein in the upper arm at the end of the procedure there was a strong thrill in the fistula and there was a multiphasic distal left radial Doppler signal      Complications:   None    Procedure and Technique:  Informed consent was obtained from the appropriate party the patient was correctly identified in the holding area brought to the operating room placed in the supine position appropriate lines and monitors were placed after satisfactory induction of general endotracheal anesthesia the left arm was prepped and draped in the normal sterile fashion.  A Jako timeout was performed the patient received appropriate periprocedural antibiotics.  The arm was vein mapped prior to prep the basilic vein was adequate in the upper arm I elected to make an incision for the fistula creation  just proximal to the elbow.  Transverse incision was made on the medial aspect of the arm proximal to the elbow.  Dissection was carried through the subcutaneous tissue the basilic vein was identified it was circumferentially dissected out it was noted to be greater than 3 mm in diameter.  Dissection was then carried onto the deep fascia which was then opened and the brachial artery was identified and circumferentially dissected out and controlled.  It was noted to be soft with a strong pulse and approximately 3-1/2 mm in diameter.  The basilic vein was clipped distally it was clamped with a padded bulldog proximally the vein was then divided close to the clips.  Adequate length was then mobilized on the vein it was then slightly distended with a fine hemostat and generously flushed with heparinized saline.  The brachial artery was clamped proximally and distally with vascular clamps a longitudinal arteriotomy was made with an 11 blade extended with King scissors the vein was slightly spatulated and then a standard end-to-side anastomosis was fashioned using a running 6-0 Prolene.  Prior to completing the suture line the vessel was antegrade and retrograde flushed.  Suture line was completed the clamps were released there was a strong thrill in the fistula a strong pulse in the brachial artery and a multiphasic distal radial Doppler signal.  Some additional mobilization was performed of the basilic to alleviate any tight angulation the additional branch was clipped.  The wound was then irrigated and local anesthetic was instilled the incision was then closed in 2 layers the skin was closed with a 4-0 Monocryl in subcuticular fashion and Dermabond was applied for dressing.  Patient tolerated the procedure well         I was present for the entire procedure.    Patient Disposition:  PACU Vascular Quality Initiative - Hemodialysis Access Placement          Pre-admission Information   Functional status: Fully active;  able to carry on all predisease activities without restriction.     ESRD: ESRD: Hemodialysis dependent.  Current Access Type.: Tunneled Catheter    Historical Information      Previous Access: left   Access Type/Location: ACCESS TYPE: Surgical AVF  Access Location: Upper arm Cephalic.         Procedure Information      Status: Outpatient     Side:left      Anesthesia: General     Access Type: Access Type: Surgical AVF Inflow Artery is:  Brachial, Upper Arm  Intraoperative Artery taget diameter is: 3mm.  Outflow Vein is: Basilic, Upper Arm.  Intraoperative Vein target diameter is: 4mm  Anastomosis configuration is: End to Side.  Cocomitant Procedure performed-: None    Completion Fistulogram: no     Preop ARTERIAL evaluation and/or treatment: duplex    Preop VENOUS evaluation and/or treatment: ultrasound mapping    *Obtain Target Diameters from study          Post op Information     Discharge Status: Home    Post op Complications: None             SIGNATURE: Scout James DO  DATE: December 23, 2024  TIME: 9:07 AM

## 2024-12-23 NOTE — ANESTHESIA PREPROCEDURE EVALUATION
Procedure:  left arm AVF creation (Left: Arm Upper)    Relevant Problems   ANESTHESIA (within normal limits)      CARDIO   (+) Coronary artery disease with angina pectoris (HCC)   (+) History of PTCA with stent   (+) MI (myocardial infarction) (HCC)   (+) Other hyperlipidemia   (+) Primary hypertension   (+) Pulmonary embolism (HCC)      /RENAL   (+) Dialysis patient (HCC)   (+) ESRD (end stage renal disease) on dialysis (HCC)      HEMATOLOGY   (+) Anemia of chronic disease      PULMONARY   (+) ANGY (obstructive sleep apnea)      Last dialyzed yesterday    SABINE to LAD in 2020    Able to climb flight of stairs without cardiopulmonary limitation    Cormack I with Araya 4 on 7/19/24. When queried about difficult intubation hx, patient reports emergent intubation during remote procedure for which sedation had been planned, but does not recall being told intubation itself was difficult    Denies recent fever, cough or other symptom of upper respiratory tract infection.      Physical Exam    Airway    Mallampati score: III  TM Distance: >3 FB  Neck ROM: full     Dental   Comment: Some missing denies loose.     Cardiovascular  Rhythm: regular, Rate: normal    Pulmonary   Breath sounds clear to auscultation    Other Findings        Anesthesia Plan  ASA Score- 4     Anesthesia Type- general with ASA Monitors.         Additional Monitors:     Airway Plan: ETT.           Plan Factors-Exercise tolerance (METS): >4 METS.    Chart reviewed. EKG reviewed.  Existing labs reviewed. Patient summary reviewed.    Patient is not a current smoker.              Induction- intravenous.    Postoperative Plan- Plan for postoperative opioid use.         Informed Consent- Anesthetic plan and risks discussed with patient.  I personally reviewed this patient with the CRNA. Discussed and agreed on the Anesthesia Plan with the CRNA..

## 2024-12-23 NOTE — NURSING NOTE
KATERINE Lara, at bedside to assess AVF creation. Per Marco, site has to mature before can feel bruit; thrill is audible.

## 2024-12-24 ENCOUNTER — TELEPHONE (OUTPATIENT)
Dept: HEMATOLOGY ONCOLOGY | Facility: CLINIC | Age: 44
End: 2024-12-24

## 2024-12-24 ENCOUNTER — PREP FOR PROCEDURE (OUTPATIENT)
Dept: INTERVENTIONAL RADIOLOGY/VASCULAR | Facility: CLINIC | Age: 44
End: 2024-12-24

## 2024-12-24 DIAGNOSIS — N18.6 ESRD (END STAGE RENAL DISEASE) (HCC): Primary | ICD-10-CM

## 2024-12-24 NOTE — TELEPHONE ENCOUNTER
Left message for patient as a reminder to complete labs prior to upcoming appointment with Dr. Snyder on 12/30/24. Advised patient to  go to any [facility]'s lab, the orders are in the system, and tests are non-fasting so they can go at anytime. Provided call back for any questions or concerns [phone].

## 2024-12-26 ENCOUNTER — TELEPHONE (OUTPATIENT)
Dept: VASCULAR SURGERY | Facility: CLINIC | Age: 44
End: 2024-12-26

## 2024-12-26 NOTE — TELEPHONE ENCOUNTER
Contacted patient to inquire about the hydrocodone in November.  He stated that this was given to him this medication in the ER when he had cellulitis and he does not remember what happened with it.  He stated that he was not given a prescription for it when he left the ER.  But he stated that he ended up going to his PCP to get something for pain and was prescribed the oxycodone.

## 2024-12-26 NOTE — TELEPHONE ENCOUNTER
Reviewed.  I spoke with the [facility] pharmacy and they confirmed that he did  the prescription for hydrocodone in September and November.  If he picked up those medications at that time and was able to tolerate them at that time, I do not feel that his nausea and stomach upset is related to hydrocodone now.  As such, I would advise the patient reach out to his PCP regarding medication for nausea.

## 2024-12-26 NOTE — TELEPHONE ENCOUNTER
During conversation, I did inquire if he tolerated the oxycodone that he was prescribed in November by his PCP and he stated that he had no issue with the oxycodone.

## 2024-12-26 NOTE — TELEPHONE ENCOUNTER
OccupationalTherapy Progress Note     Patient Name: Bethany Maki  [id number] Date: 5/2/2018  Problem List  Patient Active Problem List   Diagnosis    [facility]    Aortic stenosis    Benign essential HTN    Controlled diabetes mellitus type II without complication (HCC)    [facility]rial fibrillation with RVR [facility]    Acute ischemic right MCA stroke ([facility] )    CAD (coronary artery disease)    Cognitive impairment    Depression with anxiety    Esophageal reflux    Hyperlipidemia    Hypertension    Osteoarthritis    Squamous cell carcinoma of skin    Tinnitus    Severe protein-calorie malnutrition [place]: less than 60% of standard weight) (HCC)    Nausea    Transaminitis    Moderate protein-calorie malnutrition (HCC)    Goals of care, counseling/discussion        05/02/18 1213   Restrictions/Precautions   Weight Bearing Precautions Per Order No   Other Precautions Multiple lines;Telemetry; Fall Risk  (O2 weaned to as needed (baseline))   Pain Assessment   Pain Assessment 0-10   Pain Score No Pain   ADL   Grooming Assistance 5  Supervision/Setup   UB Bathing Assistance 4  Minimal Assistance   LB Bathing Assistance 4  Minimal Assistance   UB Dressing Assistance 4  Minimal Assistance   LB Dressing Assistance 4  Minimal Assistance   LB Dressing Comments forward bend seated standard chair    Functional Standing Tolerance   Time 5 min   Activity grooming at sink   Comments min SOB, recovers quickly with seated short rest break   Transfers   Sit to Stand 4  Minimal assistance   Additional items Assist x 1;Verbal cues; Increased time required   Stand to Sit 4  Minimal assistance   Additional items Assist x 1; Increased time required;Verbal cues   Stand pivot 4  Minimal assistance   Additional items Assist x 1; Increased time required;Verbal cues   Toilet transfer 4  Minimal assistance   Additional items Assist x 1;Standard toilet  (Gbx 1)   Additional Comments room air, Reviewed. Patient should have resumed his warfarin the day after his procedure. Please advise him to take a dose today and then he should have his INR rechecked in 2 days. Also, for clarification, when he was prescribed Hydrocodone in November... did he tolerate it without an issue?   forgetful x1 RW use   Therapeutic Exercise - ROM   UE-ROM ( arm abd 2 x 10 with paced breathing )   Therapeutic Excerise-Strength   UE Strength (2 x 10 chest press against min resistance unsupported sit)   Cognition   Overall Cognitive Status Impaired   Arousal/Participation Alert; Cooperative   Attention Attends with cues to redirect   Orientation Level Oriented X4   Memory Decreased recall of recent events;Decreased recall of precautions  (forgetful)   Following Commands Follows one step commands with increased time or repetition   Comments pleasant, motivated   Activity Tolerance   Activity Tolerance Patient tolerated treatment well   Medical Staff Made Aware RN consents, CM aware    Assessment   Assessment Pt seen bedside for OT treatment  Feels well  Complains of mild breathlessness with exertion   On room air  (baseline O2 as needed at home)  Agreeable to participate in OT  Focus of treatment on improving ADL, functional mobility, endurance, safety  Overall min assist ADL, min ADL transfers  Fair tolerance for activity, stood for sink ADL x 5 min  Educated on ECT, PLB with activity, ex as needed  Improved Barthel index from IE   Making good progress toward OT goals  Continues with following occupational deficits: weakness, balance, safety, decreased activity tolerance  Training provided in : safety, precautions, equip use, body mechanics and compensatory tech for ADL, functional transfers  Continues to function below baseline  Continue to follow per OT POC   Recommend STR  Good candidate  Plan   Treatment Interventions ADL retraining;Functional transfer training;UE strengthening/ROM; Endurance training;Patient/family training;Cognitive reorientation;Equipment evaluation/education; Compensatory technique education; Energy conservation   Goal Expiration Date 05/14/18   OT Frequency 3-5x/wk   Recommendation   OT Discharge Recommendation Short Term Rehab  (good candidate )   OT - OK to Discharge Yes  (to rehab )   Barthel Index   Feeding 10   Bathing 0   Grooming Score 5   Dressing Score 5   Bladder Score 10   Bowels Score 10   Toilet Use Score 5   Transfers (Bed/Chair) Score 10   Mobility (Level Surface) Score 10   Stairs Score 0   Barthel Index Score 65     Michelle Guevara, MOTR/L

## 2024-12-26 NOTE — TELEPHONE ENCOUNTER
Vascular Nurse Navigator Post Op Call    Procedure: Left - left arm brachio-basilic AVF creation      Date of Procedure: 12/23/24     Surgeon:    * Scout James DO - Primary     * Marco Morgan PA-C - Assisting      Painful tingling or numbness in your fingers?: No    Paleness/Coolness in hands/fingers?: No    Redness, swelling or pus from your wound?: No    Bleeding?: No    Thrill present?: Yes    Anticoagulation pt was discharged on post op?: Aspirin and Warfarin (Coumadin or Jantoven)    Statin pt was discharged on post op?:  Lipitor (atorvastatin)    Fever/chills?: No    Uncontrolled Pain?: Yes      Reviewed discharge instructions and incision care with patient.      Dialysis Days and Location: MWF at [facility]     NEXT SCHEDULED OFFICE VISIT:  1/7/24 at 3:30 pm with PETRA Garcia at [facility]     Transportation Confirmed?: Yes      Any Questions or Concerns?    Patient stated that he is doing okay since procedure.  He stated that he has sever pain in his arm that is keeping him awake and he is not able to sleep.  He stated that he has been taking the hydrocodone/acetaminophen as prescribed and every time he takes it, he gets sick to his stomach and it is not helping.  He stated that he has never taken this medication before and the ones he had previously, he did not have a problem with.  He stated that he is taking the pain medication with food and he is still having stomach issues with it.  He stated that he takes all his medications with food as it helps him remember to take them.  He stated that he has numbness in his forearm - from incision to wrist - that seems to be a little better today.He denies any numbness or tingling in his hand or finger or change in temperature or color of his fingers/hand.  Reviewed incision care with him - wash daily with soap and water.  Reviewed discharge medications - Aspirin and Coumadin.  He questioned as to whether it is ok to restart  coumadin as this was not indicated on his discharge instructions.  Informed him will send message to our triage provider to inquire about this.  Also informed him I would send a message to our triage provider to inquire about an alternative pain medication as he is unable to take the hydrocodone/acetaminophen for his pain as it is causing stomach issues and then get back to him with recommendations.  He was agreeable to same.  He stated the [facility] on file would be where any prescriptions would need to go.

## 2024-12-26 NOTE — TELEPHONE ENCOUNTER
Contacted patient to inform him of information from PETRA Muniz.  Patient unhappy about information and recommendation and hung up phone.  Contacted him back to inform him to restart his Coumadin and get INR checked in 2 days.  Verbal understanding received.

## 2024-12-30 ENCOUNTER — TELEMEDICINE (OUTPATIENT)
Dept: HEMATOLOGY ONCOLOGY | Facility: CLINIC | Age: 44
End: 2024-12-30
Payer: MEDICARE

## 2024-12-30 DIAGNOSIS — D47.2 MGUS (MONOCLONAL GAMMOPATHY OF UNKNOWN SIGNIFICANCE): Primary | ICD-10-CM

## 2024-12-30 PROCEDURE — 99214 OFFICE O/P EST MOD 30 MIN: CPT | Performed by: INTERNAL MEDICINE

## 2025-01-02 ENCOUNTER — HOSPITAL ENCOUNTER (OUTPATIENT)
Dept: NON INVASIVE DIAGNOSTICS | Facility: HOSPITAL | Age: 45
Discharge: HOME | End: 2025-01-02
Attending: RADIOLOGY
Payer: MEDICARE

## 2025-01-02 VITALS
DIASTOLIC BLOOD PRESSURE: 70 MMHG | OXYGEN SATURATION: 98 % | SYSTOLIC BLOOD PRESSURE: 139 MMHG | HEART RATE: 69 BPM | RESPIRATION RATE: 22 BRPM

## 2025-01-02 DIAGNOSIS — N18.6 ESRD (END STAGE RENAL DISEASE) (HCC): ICD-10-CM

## 2025-01-02 PROCEDURE — 36581 REPLACE TUNNELED CV CATH: CPT | Performed by: INTERNAL MEDICINE

## 2025-01-02 PROCEDURE — 36598 INJ W/FLUOR EVAL CV DEVICE: CPT

## 2025-01-02 PROCEDURE — 77001 FLUOROGUIDE FOR VEIN DEVICE: CPT

## 2025-01-02 PROCEDURE — 77001 FLUOROGUIDE FOR VEIN DEVICE: CPT | Performed by: INTERNAL MEDICINE

## 2025-01-02 PROCEDURE — 36581 REPLACE TUNNELED CV CATH: CPT

## 2025-01-02 PROCEDURE — C1750 CATH, HEMODIALYSIS,LONG-TERM: HCPCS

## 2025-01-02 PROCEDURE — C1769 GUIDE WIRE: HCPCS

## 2025-01-02 RX ORDER — LIDOCAINE HYDROCHLORIDE AND EPINEPHRINE 10; 10 MG/ML; UG/ML
INJECTION, SOLUTION INFILTRATION; PERINEURAL AS NEEDED
Status: COMPLETED | OUTPATIENT
Start: 2025-01-02 | End: 2025-01-02

## 2025-01-02 RX ORDER — CLINDAMYCIN PHOSPHATE 600 MG/50ML
600 INJECTION, SOLUTION INTRAVENOUS ONCE
Status: COMPLETED | OUTPATIENT
Start: 2025-01-02 | End: 2025-01-02

## 2025-01-02 RX ADMIN — CLINDAMYCIN PHOSPHATE 600 MG: 600 INJECTION, SOLUTION INTRAVENOUS at 13:55

## 2025-01-02 RX ADMIN — LIDOCAINE HYDROCHLORIDE AND EPINEPHRINE 20 ML: 10; 10 INJECTION, SOLUTION INFILTRATION; PERINEURAL at 13:56

## 2025-01-02 RX ADMIN — IOHEXOL 10 ML: 350 INJECTION, SOLUTION INTRAVENOUS at 14:12

## 2025-01-02 NOTE — BRIEF OP NOTE (RAD/CATH)
INTERVENTIONAL RADIOLOGY PROCEDURE NOTE    Date: 1/2/2025    Procedure:   Procedure Summary       Date: 01/02/25 Room / Location: [facility] Cardiac Cath Lab    Anesthesia Start:  Anesthesia Stop:     Procedure: IR TUNNELED DIALYSIS CATHETER CHECK/CHANGE/REPOSITION/ANGIOPLASTY Diagnosis:       ESRD (end stage renal disease) (HCC)      (poor catheter function)    Scheduled Providers:  Responsible Provider:     Anesthesia Type: Not recorded ASA Status: Not recorded            Preoperative diagnosis:   1. ESRD (end stage renal disease) (HCC)         Postoperative diagnosis: Same.    Surgeon: Stuart Nation MD     Assistant: None. No qualified resident was available.    Blood loss: 5 mL    Specimens: None     Findings: Contrast injection of existing tunneled dialysis catheter showed free flow of contrast with no fibrin sheath identified.    The existing tunneled dialysis catheter was exchanged for a new 15.5 [place] x 32 cm dialysis catheter.    The catheter may be used immediately.    Complications: None immediate.    Anesthesia: local

## 2025-01-02 NOTE — SEDATION DOCUMENTATION
Permacath exchange complete by Dr. Nation. Patient tolerated procedure well. Dressing in place. Instruction gone over and given to patient. Patient verbalized understanding.

## 2025-01-02 NOTE — DISCHARGE INSTRUCTIONS
Perma-cath Placement   WHAT YOU NEED TO KNOW:   A perma-cath is a catheter placed through a vein into or near your right atrium. Your right atrium is the right upper chamber of your heart. A perma-cath is used for dialysis in an emergency or until a long-term device is ready to use. After your procedure, you will have some pain and swelling on your chest and neck. You may have some bruises on your chest and neck. You may also have 2 dressings, one on your chest and one on your neck.   DISCHARGE INSTRUCTIONS:   Call 911 for any of the following:   You feel lightheaded, short of breath, and have chest pain.    Your catheter comes out   Contact Interventional Radiology at [phone] (ANDRES PATIENTS: Contact Interventional Radiology at [phone]) (CHELSI PATIENTS: Contact Interventional Radiology at [phone]) if:  Blood soaks through your bandage.   You have new swelling in your arm, neck, face, or chest on your right side.  Your catheter gets wet.    Your bruises or pain get worse.   You have a fever or chills.  Persistent nausea or vomiting.   Your incision is red, swollen, or draining pus.   You have questions or concerns about your condition or care.  Self-care:       Resume your normal diet.  Keep your dressings dry. Do not take a shower or swim. You may take a tub bath, but do not get your dressings wet. Water in your wound can cause bacteria to grow and cause an infection. If your dressing gets wet, dry it off and cover it with dry sterile gauze. Call your healthcare provider. Do not use soaps or ointments.  Do not change your dressings. Your healthcare provider or dialysis nurse will change your dressings. Your dressings should stay in place until your healthcare provider removes them. The dressing on your chest will stay as long as you have the catheter in place. The dressing prevents infection.    Do not remove the red and blue caps from the end of your catheter. The caps prevent air from getting  into your catheter.  Follow up with your healthcare provider as directed: Write down your questions so you remember to ask them during your visits.

## 2025-01-03 NOTE — PROGRESS NOTES
"Name: Joaquin Frankel      : 1980      MRN: [id number]  Encounter Provider: PETRA Pineda  Encounter Date: 2025   Encounter department: [facility] [place]  :  Assessment & Plan  ESRD (end stage renal disease) on dialysis (HCC)  44-year-old male former smoker with morbid obesity, CAD with stenting, ANGY, HLD, HTN, and ESRD on HD via chest wall permacath s/p left radiocephalic AVF failed to mature, now s/p L brachiobasilic AVF (stage I) 2024 (Feyko) presents for postop follow-up.    -Patient POD #15.  Doing well postoperatively.  -Reports residual \"burning\" numbness to underside of forearm. He reports symptomatic area is decreasing.  Sometimes this is positional.  -Patient reports edema significantly improved.  -M/s intact.   -Palpable left radial pulse  -LUE incision CDI with surgical glue.  No dehiscence, drainage, bleeding, ecchymosis or signs of infection  -+ Bruit (thrill unable to be palpated)     Plan:  -Continue HD via R CW PermCath  -HD access duplex in 4 weeks (6 weeks postop)  -Return to office with  to review and plan second stage elevation.  -Incision care reviewed wash daily soap and water, pat dry.  -Call or return to office with any changes to incisions, or new or worsening LUE symptoms.  Orders:    VAS DIALYSIS ACCESS EVALUATION- LEFT AV(Upper); Future    AVF (arteriovenous fistula) ([facility])  S/p first stage brachiobasilic AVF creation 2024  Morbid obesity ([facility])             History of Present Illness     Patient is s/p LUE brachio-basilic AVF creation on 24 (Feyko) and presents today for post-op visit.     HPI  Joaquin Frankel is a 44 y.o. male who presents for postop follow-up s/p AVF creation.  See above H&P  History obtained from: patient    Review of Systems   Constitutional: Negative.    HENT: Negative.     Eyes: Negative.    Respiratory: Negative.     Cardiovascular:  Positive for leg swelling.   Gastrointestinal: Negative.    Endocrine: " "Negative.    Genitourinary: Negative.    Musculoskeletal: Negative.    Skin: Negative.    Allergic/Immunologic: Negative.    Neurological: Negative.    Hematological: Negative.    Psychiatric/Behavioral: Negative.     I have reviewed and made appropriate changes to the review of systems input by the medical assistant.    Medical History Reviewed by provider this encounter:     .  Past Medical History   Past Medical History:   Diagnosis Date    Acute hypoxic respiratory failure ([facility]) 10/07/2023    Arthritis     Breathing difficulty     Cellulitis 10/07/2023    Chronic kidney disease     end stage renal disease    Coronary artery disease     Diabetes mellitus ([facility])     Diabetic neuropathy ([facility]) 05/28/2021    Heart disease     CAD   s/p ptca with 1 stent 2012    Hidradenitis suppurativa     groin    History of transfusion     Hyperlipidemia     Hypertension     MI (myocardial infarction) ([facility])     \" silent \" M.I. in the past    Morbid obesity with BMI of 50.0-59.9, adult ([facility])     Nicotine dependence     Obesity     PE (pulmonary thromboembolism) ([facility]) 06/2024    small on left side, takes coumadin    Pilonidal cyst     Type 1 non-ST elevation myocardial infarction (NSTEMI) ([facility]) 11/29/2020     Past Surgical History:   Procedure Laterality Date    CARDIAC SURGERY      CORONARY ANGIOPLASTY WITH STENT PLACEMENT      x2    INCISION AND DRAINAGE OF WOUND N/A 01/24/2017    Procedure: INCISION AND DRAINAGE (I&D) BUTTOCK, PILONIDAL CYST;  Surgeon: Simba Adhikari MD;  Location: [facility];  Service:     IR BIOPSY BONE MARROW  06/12/2024    IR BIOPSY KIDNEY RANDOM  07/12/2024    IR BIOPSY KIDNEY RANDOM  07/19/2024    IR TEMPORARY DIALYSIS CATHETER PLACEMENT  06/06/2024    IR TUNNELED CENTRAL LINE PLACEMENT  06/14/2024    IR TUNNELED DIALYSIS CATHETER CHECK/CHANGE/REPOSITION/ANGIOPLASTY  1/2/2025    LEG SURGERY Left     I&D of left leg 2012. Had resp failure and had to be intubated during procedure.    ND ARTERIOVENOUS ANASTOMOSIS " OPEN DIRECT Left 09/26/2024    Procedure: left arm radio-cephalic AVF creation;  Surgeon: Scout James DO;  Location: [facility];  Service: Vascular    AR ARTERIOVENOUS ANASTOMOSIS OPEN DIRECT Left 12/23/2024    Procedure: left arm brachio-basilic AVF creation;  Surgeon: Scout James DO;  Location: [facility];  Service: Vascular    AR DEBRIDEMENT SUBCUTANEOUS TISSUE 1ST 20 SQ CM/< Left 07/06/2021    Procedure: DEBRIDEMENT WOUND (WASH OUT);  Surgeon: Sudheer Mcfarlane MD;  [facility]: [facility];  Service: General    AR EXC B9 LESION MRGN XCP SK TG T/A/L >4.0 CM Left 04/06/2021    Procedure: EXCISION THIGH MASS;  Surgeon: Sudheer Mcfarlane MD;  [facility];  Service: General    AR EXCISION H/P/P/U COMPLEX REPAIR Left 07/06/2021    Procedure: EXCISION PERINEAL ABSCESS LEFT THIGH;  Surgeon: Sudheer Mcfarlane MD;  [facility];  Service: General    AR NEGATIVE PRESSURE WOUND THERAPY DME <= 50 SQ CM Left 04/06/2021    Procedure: APPLICATION VAC DRESSING THIGH;  Surgeon: Sudheer Mcfarlane MD;  [facility];  Service: General    WOUND DEBRIDEMENT Left 04/17/2021    Procedure: DEBRIDEMENT WOUND AND DRESSING CHANGE (WASH OUT);  Surgeon: Mahin Traore DO;  [facility];  Service: General    WOUND DEBRIDEMENT Left 04/22/2021    Procedure: DEBRIDEMENT LOWER EXTREMITY (WASH OUT), left groin and thigh;  Surgeon: Sudheer Mcfarlane MD;  [facility];  Service: General    WOUND DEBRIDEMENT Left 05/26/2021    Procedure: DEBRIDEMENT LOWER EXTREMITY (WASH OUT);  Surgeon: Zak Castanon DO;  [facility];  Service: General    WOUND DEBRIDEMENT Left 05/28/2021    Procedure: Washout left thigh wound and closure;  Surgeon: Amor Jaramillo DO;  [facility];  Service: General     Family History   Problem Relation Age of Onset    Heart disease Father     Diabetes Father     Hypertension Mother     Heart disease Mother       reports that he quit smoking about 3 years ago. His smoking use included  cigarettes. He started smoking about 4 years ago. He has a 29.9 pack-year smoking history. He has been exposed to tobacco smoke. He has never used smokeless tobacco. He reports that he does not currently use alcohol. He reports that he does not use drugs.  Current Outpatient Medications on File Prior to Visit   Medication Sig Dispense Refill    aspirin (ECOTRIN LOW STRENGTH) 81 mg EC tablet Take 1 tablet (81 mg total) by mouth daily 90 tablet 2    atorvastatin (LIPITOR) 80 mg tablet Take 1 tablet (80 mg total) by mouth daily 90 tablet 3    b complex-vitamin C-folic acid (RENAL) 1 mg Take 1 capsule by mouth daily with dinner 30 capsule 8    calcium acetate (PHOSLO) capsule Take 2 capsules (1,334 mg total) by mouth 3 (three) times a day 540 capsule 1    carvedilol (COREG) 6.25 mg tablet Take 1 tablet (6.25 mg total) by mouth 2 (two) times a day with meals 180 tablet 1    isosorbide mononitrate (IMDUR) 60 mg 24 hr tablet Take 1 tablet (60 mg total) by mouth daily 90 tablet 2    metolazone (ZAROXOLYN) 10 mg tablet Take 1 tablet (10 mg total) by mouth 4 (four) times a week 50 tablet 1    NIFEdipine (PROCARDIA XL) 30 mg 24 hr tablet Take 1 tablet (30 mg total) by mouth daily at bedtime 90 tablet 1    sevelamer (RENAGEL) 800 mg tablet Take 1 tablet (800 mg total) by mouth 3 (three) times a day with meals 540 tablet 1    torsemide (DEMADEX) 100 mg tablet Take 1 tablet (100 mg total) by mouth 4 (four) times a week 50 tablet 1    warfarin (COUMADIN) 10 mg tablet Take 1.5 tablets (15 mg total) by mouth daily 135 tablet 5    Cholecalciferol (VITAMIN D3) 1,000 units tablet Take 2 tablets (2,000 Units total) by mouth daily 60 tablet 5     No current facility-administered medications on file prior to visit.     Allergies   Allergen Reactions    Keflex [Cephalexin] Facial Swelling and Lip Swelling    Amoxicillin Hives     childhood      Current Outpatient Medications on File Prior to Visit   Medication Sig Dispense Refill     aspirin (ECOTRIN LOW STRENGTH) 81 mg EC tablet Take 1 tablet (81 mg total) by mouth daily 90 tablet 2    atorvastatin (LIPITOR) 80 mg tablet Take 1 tablet (80 mg total) by mouth daily 90 tablet 3    b complex-vitamin C-folic acid (RENAL) 1 mg Take 1 capsule by mouth daily with dinner 30 capsule 8    calcium acetate (PHOSLO) capsule Take 2 capsules (1,334 mg total) by mouth 3 (three) times a day 540 capsule 1    carvedilol (COREG) 6.25 mg tablet Take 1 tablet (6.25 mg total) by mouth 2 (two) times a day with meals 180 tablet 1    isosorbide mononitrate (IMDUR) 60 mg 24 hr tablet Take 1 tablet (60 mg total) by mouth daily 90 tablet 2    metolazone (ZAROXOLYN) 10 mg tablet Take 1 tablet (10 mg total) by mouth 4 (four) times a week 50 tablet 1    NIFEdipine (PROCARDIA XL) 30 mg 24 hr tablet Take 1 tablet (30 mg total) by mouth daily at bedtime 90 tablet 1    sevelamer (RENAGEL) 800 mg tablet Take 1 tablet (800 mg total) by mouth 3 (three) times a day with meals 540 tablet 1    torsemide (DEMADEX) 100 mg tablet Take 1 tablet (100 mg total) by mouth 4 (four) times a week 50 tablet 1    warfarin (COUMADIN) 10 mg tablet Take 1.5 tablets (15 mg total) by mouth daily 135 tablet 5    Cholecalciferol (VITAMIN D3) 1,000 units tablet Take 2 tablets (2,000 Units total) by mouth daily 60 tablet 5     No current facility-administered medications on file prior to visit.      Social History     Tobacco Use    Smoking status: Former     Current packs/day: 0.00     Average packs/day: 1.5 packs/day for 20.0 years (29.9 ttl pk-yrs)     Types: Cigarettes     Start date: 01/2021     Quit date: 03/2021     Years since quitting: 3.8     Passive exposure: Past    Smokeless tobacco: Never   Vaping Use    Vaping status: Never Used   Substance and Sexual Activity    Alcohol use: Not Currently     Comment: rarely    Drug use: No    Sexual activity: Not Currently     Partners: Female        Objective   /82 (BP Location: Right arm, Patient  "Position: Sitting)   Pulse 71   Resp 20   Ht 5' 8\" (1.727 m)   Wt (!) 177 kg (390 lb)   SpO2 97%   BMI 59.30 kg/m²      Physical Exam  Vitals reviewed.   Constitutional:       General: He is not in acute distress.     Appearance: He is obese.   Cardiovascular:      Rate and Rhythm: Normal rate.      Pulses:           Radial pulses are 2+ on the left side.      Arteriovenous access: Left arteriovenous access is present.     Comments: LUE aVF  + Bruit  Pulmonary:      Effort: Pulmonary effort is normal. No respiratory distress.   Skin:     General: Skin is warm.      Comments: LUE incision CDI with surgical glue.  No dehiscence, drainage, erythema, or signs infection.   Neurological:      Mental Status: He is alert and oriented to person, place, and time.      Sensory: No sensory deficit.      Motor: No weakness.         Administrative Statements   I have spent a total time of 20 minutes in caring for this patient on the day of the visit/encounter including Instructions for management, Patient and family education, Impressions, Documenting in the medical record, Reviewing / ordering tests, medicine, procedures  , and Obtaining or reviewing history  .   "

## 2025-01-07 ENCOUNTER — OFFICE VISIT (OUTPATIENT)
Dept: VASCULAR SURGERY | Facility: CLINIC | Age: 45
End: 2025-01-07

## 2025-01-07 VITALS
DIASTOLIC BLOOD PRESSURE: 82 MMHG | HEIGHT: 68 IN | WEIGHT: 315 LBS | BODY MASS INDEX: 47.74 KG/M2 | RESPIRATION RATE: 20 BRPM | OXYGEN SATURATION: 97 % | HEART RATE: 71 BPM | SYSTOLIC BLOOD PRESSURE: 124 MMHG

## 2025-01-07 DIAGNOSIS — E66.01 MORBID OBESITY (HCC): ICD-10-CM

## 2025-01-07 DIAGNOSIS — Z99.2 ESRD (END STAGE RENAL DISEASE) ON DIALYSIS (HCC): Primary | ICD-10-CM

## 2025-01-07 DIAGNOSIS — I77.0 AVF (ARTERIOVENOUS FISTULA) (HCC): ICD-10-CM

## 2025-01-07 DIAGNOSIS — N18.6 ESRD (END STAGE RENAL DISEASE) ON DIALYSIS (HCC): Primary | ICD-10-CM

## 2025-01-07 PROCEDURE — 99024 POSTOP FOLLOW-UP VISIT: CPT | Performed by: NURSE PRACTITIONER

## 2025-01-07 NOTE — PATIENT INSTRUCTIONS
-your incision is healing well.  -continue hemodialysis via PermCath at the present time.  -continue incisional care to upper extremity.  Clean incision daily with soap and water.  No lotions, ointments or creams to incision.  No soaking or submerging incision x4 weeks  -initiate hand exercises to help promote maturity fistula  -we will schedule you for a upper extremity ultrasound to evaluate AV fistula maturity   -return to office after imaging with surgeon Dr James , to plan for elevation of fistula  -please contact the office in the interim with any questions, concerns or changes in your incision.

## 2025-01-07 NOTE — ASSESSMENT & PLAN NOTE
"44-year-old male former smoker with morbid obesity, CAD with stenting, ANGY, HLD, HTN, and ESRD on HD via chest wall permacath s/p left radiocephalic AVF failed to mature, now s/p L brachiobasilic AVF (stage I) 12/23/2024 (Erika) presents for postop follow-up.    -Patient POD #15.  Doing well postoperatively.  -Reports residual \"burning\" numbness to underside of forearm. He reports symptomatic area is decreasing.  Sometimes this is positional.  -Patient reports edema significantly improved.  -M/s intact.   -Palpable left radial pulse  -LUE incision CDI with surgical glue.  No dehiscence, drainage, bleeding, ecchymosis or signs of infection  -+ Bruit (thrill unable to be palpated)     Plan:  -Continue HD via R CW PermCath  -HD access duplex in 4 weeks (6 weeks postop)  -Return to office with  to review and plan second stage elevation.  -Incision care reviewed wash daily soap and water, pat dry.  -Call or return to office with any changes to incisions, or new or worsening LUE symptoms.  Orders:    VAS DIALYSIS ACCESS EVALUATION- LEFT AV(Upper); Future    "

## 2025-01-09 ENCOUNTER — LAB (OUTPATIENT)
Dept: LAB | Facility: HOSPITAL | Age: 45
End: 2025-01-09
Attending: INTERNAL MEDICINE
Payer: MEDICARE

## 2025-01-09 ENCOUNTER — RESULTS FOLLOW-UP (OUTPATIENT)
Dept: FAMILY MEDICINE CLINIC | Facility: CLINIC | Age: 45
End: 2025-01-09

## 2025-01-09 ENCOUNTER — ANTICOAG VISIT (OUTPATIENT)
Dept: FAMILY MEDICINE CLINIC | Facility: CLINIC | Age: 45
End: 2025-01-09

## 2025-01-09 DIAGNOSIS — D47.2 MGUS (MONOCLONAL GAMMOPATHY OF UNKNOWN SIGNIFICANCE): ICD-10-CM

## 2025-01-09 DIAGNOSIS — I26.99 ACUTE PULMONARY EMBOLISM, UNSPECIFIED PULMONARY EMBOLISM TYPE, UNSPECIFIED WHETHER ACUTE COR PULMONALE PRESENT (HCC): ICD-10-CM

## 2025-01-09 DIAGNOSIS — D47.2 MONOCLONAL PARAPROTEINEMIA: Primary | ICD-10-CM

## 2025-01-09 LAB
ALBUMIN SERPL BCG-MCNC: 3.8 G/DL (ref 3.5–5)
ALP SERPL-CCNC: 75 U/L (ref 34–104)
ALT SERPL W P-5'-P-CCNC: 16 U/L (ref 7–52)
ANION GAP SERPL CALCULATED.3IONS-SCNC: 10 MMOL/L (ref 4–13)
AST SERPL W P-5'-P-CCNC: 18 U/L (ref 13–39)
BASOPHILS # BLD AUTO: 0.05 THOUSANDS/ΜL (ref 0–0.1)
BASOPHILS NFR BLD AUTO: 1 % (ref 0–1)
BILIRUB SERPL-MCNC: 0.29 MG/DL (ref 0.2–1)
BUN SERPL-MCNC: 50 MG/DL (ref 5–25)
CALCIUM SERPL-MCNC: 8.6 MG/DL (ref 8.4–10.2)
CHLORIDE SERPL-SCNC: 95 MMOL/L (ref 96–108)
CO2 SERPL-SCNC: 31 MMOL/L (ref 21–32)
CREAT SERPL-MCNC: 7.99 MG/DL (ref 0.6–1.3)
CREAT UR-MCNC: 182.7 MG/DL
EOSINOPHIL # BLD AUTO: 0.32 THOUSAND/ΜL (ref 0–0.61)
EOSINOPHIL NFR BLD AUTO: 4 % (ref 0–6)
ERYTHROCYTE [DISTWIDTH] IN BLOOD BY AUTOMATED COUNT: 12.5 % (ref 11.6–15.1)
GFR SERPL CREATININE-BSD FRML MDRD: 7 ML/MIN/1.73SQ M
GLUCOSE SERPL-MCNC: 135 MG/DL (ref 65–140)
HCT VFR BLD AUTO: 23.1 % (ref 36.5–49.3)
HGB BLD-MCNC: 7.9 G/DL (ref 12–17)
IGA SERPL-MCNC: 362 MG/DL (ref 66–433)
IGG SERPL-MCNC: 1630 MG/DL (ref 635–1741)
IGM SERPL-MCNC: 34 MG/DL (ref 45–281)
IMM GRANULOCYTES # BLD AUTO: 0.05 THOUSAND/UL (ref 0–0.2)
IMM GRANULOCYTES NFR BLD AUTO: 1 % (ref 0–2)
INR PPP: 1.7 (ref 0.85–1.19)
LYMPHOCYTES # BLD AUTO: 1.25 THOUSANDS/ΜL (ref 0.6–4.47)
LYMPHOCYTES NFR BLD AUTO: 15 % (ref 14–44)
MCH RBC QN AUTO: 32.8 PG (ref 26.8–34.3)
MCHC RBC AUTO-ENTMCNC: 34.2 G/DL (ref 31.4–37.4)
MCV RBC AUTO: 96 FL (ref 82–98)
MONOCYTES # BLD AUTO: 0.49 THOUSAND/ΜL (ref 0.17–1.22)
MONOCYTES NFR BLD AUTO: 6 % (ref 4–12)
NEUTROPHILS # BLD AUTO: 6.36 THOUSANDS/ΜL (ref 1.85–7.62)
NEUTS SEG NFR BLD AUTO: 73 % (ref 43–75)
NRBC BLD AUTO-RTO: 0 /100 WBCS
PLATELET # BLD AUTO: 310 THOUSANDS/UL (ref 149–390)
PMV BLD AUTO: 9.1 FL (ref 8.9–12.7)
POTASSIUM SERPL-SCNC: 4.4 MMOL/L (ref 3.5–5.3)
PROT SERPL-MCNC: 7.9 G/DL (ref 6.4–8.4)
PROT UR-MCNC: 839 MG/DL
PROT/CREAT UR: 4.6 MG/G{CREAT} (ref 0–0.1)
PROTHROMBIN TIME: 20.5 SECONDS (ref 12.3–15)
RBC # BLD AUTO: 2.41 MILLION/UL (ref 3.88–5.62)
SODIUM SERPL-SCNC: 136 MMOL/L (ref 135–147)
WBC # BLD AUTO: 8.52 THOUSAND/UL (ref 4.31–10.16)

## 2025-01-09 PROCEDURE — 83521 IG LIGHT CHAINS FREE EACH: CPT

## 2025-01-09 PROCEDURE — 80053 COMPREHEN METABOLIC PANEL: CPT

## 2025-01-09 PROCEDURE — 86334 IMMUNOFIX E-PHORESIS SERUM: CPT

## 2025-01-09 PROCEDURE — 84165 PROTEIN E-PHORESIS SERUM: CPT

## 2025-01-09 PROCEDURE — 82784 ASSAY IGA/IGD/IGG/IGM EACH: CPT

## 2025-01-09 PROCEDURE — 36415 COLL VENOUS BLD VENIPUNCTURE: CPT

## 2025-01-09 PROCEDURE — 85025 COMPLETE CBC W/AUTO DIFF WBC: CPT

## 2025-01-09 PROCEDURE — 84156 ASSAY OF PROTEIN URINE: CPT

## 2025-01-09 PROCEDURE — 85610 PROTHROMBIN TIME: CPT

## 2025-01-09 PROCEDURE — 82570 ASSAY OF URINE CREATININE: CPT

## 2025-01-09 NOTE — TELEPHONE ENCOUNTER
Pt has been taking the 15 mg everyday. Reviewed PCP note with pt. Pt would like to know when he is able to stop taking the warfarin as he was told he would be on it for 6 months. Please advise.

## 2025-01-11 LAB
KAPPA LC FREE SER-MCNC: 239.1 MG/L (ref 3.3–19.4)
KAPPA LC FREE/LAMBDA FREE SER: 1.3 {RATIO} (ref 0.26–1.65)
LAMBDA LC FREE SERPL-MCNC: 183.3 MG/L (ref 5.7–26.3)

## 2025-01-14 DIAGNOSIS — E66.01 MORBID OBESITY (HCC): Primary | ICD-10-CM

## 2025-01-14 LAB
ALBUMIN SERPL ELPH-MCNC: 3.46 G/DL (ref 3.2–5.1)
ALBUMIN SERPL ELPH-MCNC: 47.4 % (ref 48–70)
ALPHA1 GLOB SERPL ELPH-MCNC: 0.44 G/DL (ref 0.15–0.47)
ALPHA1 GLOB SERPL ELPH-MCNC: 6 % (ref 1.8–7)
ALPHA2 GLOB SERPL ELPH-MCNC: 1.01 G/DL (ref 0.42–1.04)
ALPHA2 GLOB SERPL ELPH-MCNC: 13.9 % (ref 5.9–14.9)
BETA GLOB ABNORMAL SERPL ELPH-MCNC: 0.45 G/DL (ref 0.31–0.57)
BETA1 GLOB SERPL ELPH-MCNC: 6.1 % (ref 4.7–7.7)
BETA2 GLOB SERPL ELPH-MCNC: 6.1 % (ref 3.1–7.9)
BETA2+GAMMA GLOB SERPL ELPH-MCNC: 0.45 G/DL (ref 0.2–0.58)
GAMMA GLOB ABNORMAL SERPL ELPH-MCNC: 1.5 G/DL (ref 0.4–1.66)
GAMMA GLOB SERPL ELPH-MCNC: 20.5 % (ref 6.9–22.3)
IGG/ALB SER: 0.9 {RATIO} (ref 1.1–1.8)
INTERPRETATION UR IFE-IMP: NORMAL
PROT PATTERN SERPL ELPH-IMP: ABNORMAL
PROT SERPL-MCNC: 7.3 G/DL (ref 6.4–8.2)

## 2025-01-14 PROCEDURE — 84165 PROTEIN E-PHORESIS SERUM: CPT | Performed by: PATHOLOGY

## 2025-02-04 ENCOUNTER — HOSPITAL ENCOUNTER (OUTPATIENT)
Dept: RADIOLOGY | Facility: HOSPITAL | Age: 45
Discharge: HOME | End: 2025-02-04
Payer: MEDICARE

## 2025-02-04 DIAGNOSIS — Z99.2 ESRD (END STAGE RENAL DISEASE) ON DIALYSIS (HCC): ICD-10-CM

## 2025-02-04 DIAGNOSIS — N18.6 ESRD (END STAGE RENAL DISEASE) ON DIALYSIS (HCC): ICD-10-CM

## 2025-02-04 DIAGNOSIS — I77.0 AVF (ARTERIOVENOUS FISTULA) (HCC): ICD-10-CM

## 2025-02-04 PROCEDURE — 93990 DOPPLER FLOW TESTING: CPT

## 2025-02-05 ENCOUNTER — HOSPITAL ENCOUNTER (OUTPATIENT)
Dept: NON INVASIVE DIAGNOSTICS | Facility: HOSPITAL | Age: 45
Discharge: HOME | End: 2025-02-05
Payer: COMMERCIAL

## 2025-02-05 VITALS
OXYGEN SATURATION: 97 % | RESPIRATION RATE: 25 BRPM | HEART RATE: 75 BPM | DIASTOLIC BLOOD PRESSURE: 64 MMHG | SYSTOLIC BLOOD PRESSURE: 143 MMHG

## 2025-02-05 DIAGNOSIS — Z99.2 ESRD (END STAGE RENAL DISEASE) ON DIALYSIS (HCC): ICD-10-CM

## 2025-02-05 DIAGNOSIS — N18.6 ESRD (END STAGE RENAL DISEASE) ON DIALYSIS (HCC): ICD-10-CM

## 2025-02-05 DIAGNOSIS — N18.6 ESRD (END STAGE RENAL DISEASE) ON DIALYSIS (HCC): Primary | ICD-10-CM

## 2025-02-05 DIAGNOSIS — Z99.2 ESRD (END STAGE RENAL DISEASE) ON DIALYSIS (HCC): Primary | ICD-10-CM

## 2025-02-05 PROCEDURE — C1750 CATH, HEMODIALYSIS,LONG-TERM: HCPCS

## 2025-02-05 PROCEDURE — 36581 REPLACE TUNNELED CV CATH: CPT | Performed by: RADIOLOGY

## 2025-02-05 PROCEDURE — 77001 FLUOROGUIDE FOR VEIN DEVICE: CPT

## 2025-02-05 PROCEDURE — C1769 GUIDE WIRE: HCPCS

## 2025-02-05 PROCEDURE — 93990 DOPPLER FLOW TESTING: CPT | Performed by: SURGERY

## 2025-02-05 PROCEDURE — 36581 REPLACE TUNNELED CV CATH: CPT

## 2025-02-05 PROCEDURE — 77001 FLUOROGUIDE FOR VEIN DEVICE: CPT | Performed by: RADIOLOGY

## 2025-02-05 RX ORDER — CLINDAMYCIN PHOSPHATE 600 MG/50ML
INJECTION, SOLUTION INTRAVENOUS
Status: COMPLETED | OUTPATIENT
Start: 2025-02-05 | End: 2025-02-05

## 2025-02-05 RX ADMIN — Medication 10 ML: at 16:06

## 2025-02-05 RX ADMIN — CLINDAMYCIN PHOSPHATE 900 MG: 600 INJECTION, SOLUTION INTRAVENOUS at 15:53

## 2025-02-05 RX ADMIN — Medication 5 ML: at 16:10

## 2025-02-05 NOTE — SEDATION DOCUMENTATION
Permacath exchanged by Dr. Morgan. Sutures and CHG dressing to right IJ site. Patient tolerated well.

## 2025-02-05 NOTE — PROGRESS NOTES
Tommie called the IR schedulers to report that the patients TDC was [facility] out and asked for a stat appt.   Attempt to contact patient via telephone. No answer. Unable to clarify if the cuff is exposed out of the chest wall.  Will place a stat TDC check/change order but unsure if patient can get an outpatient appointment for today due to the late timing. If unable to set up for today, patient should report to the ER.

## 2025-02-05 NOTE — DISCHARGE INSTRUCTIONS
Perma-cath Placement   WHAT YOU NEED TO KNOW:   A perma-cath is a catheter placed through a vein into or near your right atrium. Your right atrium is the right upper chamber of your heart. A perma-cath is used for dialysis in an emergency or until a long-term device is ready to use. After your procedure, you will have some pain and swelling on your chest and neck. You may have some bruises on your chest and neck. You may also have 2 dressings, one on your chest and one on your neck.   DISCHARGE INSTRUCTIONS:   Call 911 for any of the following:   You feel lightheaded, short of breath, and have chest pain.    Your catheter comes out   Contact Interventional Radiology at [phone] ([facility] PATIENTS: Contact Interventional Radiology at [phone]) ([facility] PATIENTS: Contact Interventional Radiology at [phone]) if:  Blood soaks through your bandage.   You have new swelling in your arm, neck, face, or chest on your right side.  Your catheter gets wet.    Your bruises or pain get worse.   You have a fever or chills.  Persistent nausea or vomiting.   Your incision is red, swollen, or draining pus.   You have questions or concerns about your condition or care.  Self-care:       Resume your normal diet.  Keep your dressings dry. Do not take a shower or swim. You may take a tub bath, but do not get your dressings wet. Water in your wound can cause bacteria to grow and cause an infection. If your dressing gets wet, dry it off and cover it with dry sterile gauze. Call your healthcare provider. Do not use soaps or ointments.  Do not change your dressings. Your healthcare provider or dialysis nurse will change your dressings. Your dressings should stay in place until your healthcare provider removes them. The dressing on your chest will stay as long as you have the catheter in place. The dressing prevents infection.    Do not remove the red and blue caps from the end of your catheter. The caps prevent air from getting  into your catheter.  Follow up with your healthcare provider as directed: Write down your questions so you remember to ask them during your visits.

## 2025-02-06 ENCOUNTER — RESULTS FOLLOW-UP (OUTPATIENT)
Dept: VASCULAR SURGERY | Facility: CLINIC | Age: 45
End: 2025-02-06

## 2025-02-12 ENCOUNTER — TELEPHONE (OUTPATIENT)
Age: 45
End: 2025-02-12

## 2025-02-12 NOTE — TELEPHONE ENCOUNTER
Patient states he had a message that his appointment was moved up. There is nothing noted in his chart to indicate that the appointment on 3/4/25 was moved up.     Of note, the patient has an appointment tomorrow 2/13/25 with [facility]. Patient stated that his doctor scheduled the appointments for him and he was confused. He does not need the appointment on 3/4/25 with Dr. Lu as it is a duplicate.

## 2025-02-13 NOTE — NURSING NOTE
PTT am draw 55. No bolus given or increase in gtt as patient will have the gtt stopped at 10 am pending IR procedure. Discussed with SLIM.    160.02

## 2025-02-14 ENCOUNTER — TELEPHONE (OUTPATIENT)
Dept: VASCULAR SURGERY | Facility: CLINIC | Age: 45
End: 2025-02-14

## 2025-02-14 ENCOUNTER — OFFICE VISIT (OUTPATIENT)
Dept: VASCULAR SURGERY | Facility: CLINIC | Age: 45
End: 2025-02-14

## 2025-02-14 VITALS
WEIGHT: 315 LBS | RESPIRATION RATE: 18 BRPM | HEIGHT: 68 IN | HEART RATE: 90 BPM | BODY MASS INDEX: 47.74 KG/M2 | OXYGEN SATURATION: 95 % | DIASTOLIC BLOOD PRESSURE: 72 MMHG | SYSTOLIC BLOOD PRESSURE: 122 MMHG

## 2025-02-14 DIAGNOSIS — N18.6 ESRD (END STAGE RENAL DISEASE) ON DIALYSIS (HCC): Primary | ICD-10-CM

## 2025-02-14 DIAGNOSIS — Z99.2 ESRD (END STAGE RENAL DISEASE) ON DIALYSIS (HCC): Primary | ICD-10-CM

## 2025-02-14 PROCEDURE — 99024 POSTOP FOLLOW-UP VISIT: CPT | Performed by: SURGERY

## 2025-02-14 RX ORDER — CLINDAMYCIN PHOSPHATE 900 MG/50ML
900 INJECTION, SOLUTION INTRAVENOUS ONCE
OUTPATIENT
Start: 2025-02-14 | End: 2025-02-14

## 2025-02-14 RX ORDER — CHLORHEXIDINE GLUCONATE ORAL RINSE 1.2 MG/ML
15 SOLUTION DENTAL ONCE
OUTPATIENT
Start: 2025-02-14 | End: 2025-02-14

## 2025-02-14 NOTE — PROGRESS NOTES
"Name: Joaquin Frankel      : 1980      MRN: [id number]  Encounter Provider: Scout James DO  Encounter Date: 2025   Encounter department: [facility] [place]  :  Assessment & Plan  ESRD (end stage renal disease) on dialysis (HCC)  Lab Results   Component Value Date    EGFR 7 2025    EGFR 5 2024    EGFR 5 2024    CREATININE 7.99 (H) 2025    CREATININE 9.84 (H) 2024    CREATININE 10.69 (H) 2024     S/p first stage left basilic AVF creation (upper arm) 2024  Duplex done 2025 reviewed which shows borderline maturation  Will schedule for elevation  Consent obtained  Hold coumadin preop        Orders:    Case request operating room: left arm basilic fistula elevation; Standing        History of Present Illness   HPI  Joaquin Frankel is a 44 y.o. male     Patient is s/p L brachiocephalic AVF creation on 24. Pt had a HD duplex 25. Pt goes to HD [facility] at [facility] in [place]. Pt continues to have mild numbness in the L forearm. There is a thrill and bruit.     History obtained from: patient    Review of Systems   Skin: Negative.    Neurological:  Positive for numbness.     I have reviewed the ROS above and made changes as needed.         Objective   /72 (BP Location: Right arm, Patient Position: Sitting)   Pulse 90   Resp 18   Ht 5' 8\" (1.727 m)   Wt (!) 170 kg (374 lb 1.6 oz)   SpO2 95%   BMI 56.88 kg/m²      Physical Exam    General  Exam: alert, awake, oriented, no distress, consistent with stated age    Adbomen  Exam: soft, non-tender, non-distended, no pulsatile abdominal masses, no abdominal bruit    Upper Extremity:  Palpation: Radial pulse- Bilateral 2+    Healed upper arm incision  Thrill present in basilic fistula  Normal LUE  strength    Neurologic  Exam:alert, non-focal, oriented x 3, cranial nerves II-XII grossly intact      Administrative Statements   I have spent a total time of 30 minutes in caring for this patient on " the day of the visit/encounter including Counseling / Coordination of care, Documenting in the medical record, and Reviewing / ordering tests, medicine, procedures  .       Operative Scheduling Information:    Hospital:  [facility]    Physician:  Erika    Surgery: Left arm basilic fistula elevation    Urgency:  Standard    Level:  Level 3: Outpatients to be scheduled for elective surgery than can be delayed up to 4 weeks without reasonable expectation of detriment to patient    Case Length:  Normal    Post-op Bed:  Outpatient    OR Table:  Standard    Equipment Needs:  None    Medication Instructions:  Coumadin:  Hold for 5 days prior to procedure    Hydration:  No    Contrast Allergy:  no

## 2025-02-14 NOTE — TELEPHONE ENCOUNTER
REMINDER: Under Reason For Call, comments MUST be formatted as:   (Surgeon's Initials) / (Procedure)      Special Instructions / FYI: No clearance, level 3, and [facility]    Consent: I certify that patient has signed, printed, timed, and dated their surgery consent.  I certify that the patient's LEGAL NAME and DATE OF BIRTH are written in the upper left corner on BOTH sides of the consent.  I certify that BOTH sides of the completed surgery consent have been scanned into the patient's Epic chart by myself on 2/14/2025.  Yes, I have LABELED the consent in Epic as Consent for Vascular Procedure.     For Surgical Clearances     Levels   1-3   ROUTE this encounter to [facility] Clearance Pool (AND)   [facility] Surgery Coordinator Pool     Level   4   ROUTE this encounter to [facility] Surgery Coordinator Pool       HYDRATION CLEARANCES   ONLY ROUTE TO  [facility] Clearance Pool       Yes, I have ROUTED this encounter to [facility] Surgery Coordinator and/or [facility] Clearance Pool.

## 2025-02-14 NOTE — ASSESSMENT & PLAN NOTE
Lab Results   Component Value Date    EGFR 7 01/09/2025    EGFR 5 11/21/2024    EGFR 5 11/16/2024    CREATININE 7.99 (H) 01/09/2025    CREATININE 9.84 (H) 11/21/2024    CREATININE 10.69 (H) 11/16/2024     S/p first stage left basilic AVF creation (upper arm) 12/2024  Duplex done 2/2025 reviewed which shows borderline maturation  Will schedule for elevation  Consent obtained  Hold coumadin preop        Orders:    Case request operating room: left arm basilic fistula elevation; Standing

## 2025-02-14 NOTE — H&P (VIEW-ONLY)
"Name: Joaquin Frankel      : 1980      MRN: [id number]  Encounter Provider: Scout James DO  Encounter Date: 2025   Encounter department: [facility]  :  Assessment & Plan  ESRD (end stage renal disease) on dialysis (HCC)  Lab Results   Component Value Date    EGFR 7 2025    EGFR 5 2024    EGFR 5 2024    CREATININE 7.99 (H) 2025    CREATININE 9.84 (H) 2024    CREATININE 10.69 (H) 2024     S/p first stage left basilic AVF creation (upper arm) 2024  Duplex done 2025 reviewed which shows borderline maturation  Will schedule for elevation  Consent obtained  Hold coumadin preop        Orders:    Case request operating room: left arm basilic fistula elevation; Standing        History of Present Illness   HPI  Joaquin Frankel is a 44 y.o. male     Patient is s/p L brachiocephalic AVF creation on 24. Pt had a HD duplex 25. Pt goes to HD [facility] at [facility] in [place]. Pt continues to have mild numbness in the L forearm. There is a thrill and bruit.     History obtained from: patient    Review of Systems   Skin: Negative.    Neurological:  Positive for numbness.     I have reviewed the ROS above and made changes as needed.         Objective   /72 (BP Location: Right arm, Patient Position: Sitting)   Pulse 90   Resp 18   Ht 5' 8\" (1.727 m)   Wt (!) 170 kg (374 lb 1.6 oz)   SpO2 95%   BMI 56.88 kg/m²      Physical Exam    General  Exam: alert, awake, oriented, no distress, consistent with stated age    Adbomen  Exam: soft, non-tender, non-distended, no pulsatile abdominal masses, no abdominal bruit    Upper Extremity:  Palpation: Radial pulse- Bilateral 2+    Healed upper arm incision  Thrill present in basilic fistula  Normal LUE  strength    Neurologic  Exam:alert, non-focal, oriented x 3, cranial nerves II-XII grossly intact      Administrative Statements   I have spent a total time of 30 minutes in caring for this patient on " the day of the visit/encounter including Counseling / Coordination of care, Documenting in the medical record, and Reviewing / ordering tests, medicine, procedures  .       Operative Scheduling Information:    Hospital:  [facility]    Physician:  Erika    Surgery: Left arm basilic fistula elevation    Urgency:  Standard    Level:  Level 3: Outpatients to be scheduled for elective surgery than can be delayed up to 4 weeks without reasonable expectation of detriment to patient    Case Length:  Normal    Post-op Bed:  Outpatient    OR Table:  Standard    Equipment Needs:  None    Medication Instructions:  Coumadin:  Hold for 5 days prior to procedure    Hydration:  No    Contrast Allergy:  no

## 2025-02-17 NOTE — TELEPHONE ENCOUNTER
Operative Scheduling Information:     Hospital:  [facility]     Physician:  Erika     Surgery: Left arm basilic fistula elevation     Urgency:  Standard     Level:  Level 3: Outpatients to be scheduled for elective surgery than can be delayed up to 4 weeks without reasonable expectation of detriment to patient     Case Length:  Normal     Post-op Bed:  Outpatient     OR Table:  Standard     Equipment Needs:  None     Medication Instructions:  Coumadin:  Hold for 5 days prior to procedure     Hydration:  No     Contrast Allergy:  no

## 2025-02-18 ENCOUNTER — PREP FOR PROCEDURE (OUTPATIENT)
Dept: VASCULAR SURGERY | Facility: CLINIC | Age: 45
End: 2025-02-18

## 2025-02-18 NOTE — TELEPHONE ENCOUNTER
Verified patient's insurance   CONFIRMED - Patient's insurance is [employer]  Is patient requesting a call when authorization has been obtained? Patient did not request a call.    Surgery Date: 3/6/25  Primary Surgeon: FABIO // Scout James (NPI: [id number])  Assisting Surgeon: Not Applicable (N/A)  Facility: [facility] (Tax: [id number] / NPI: [id number])  Inpatient / Outpatient: Outpatient  Level: 3    Clearance Received: No clearance ordered.  Consent Received: Yes, scanned into Epic on 2/14/25.  Medication Hold / Last Dose:  Hold Coumadin 5 days prior, last dose 2/28/25  IR Notified: Not Applicable (N/A)  Rep. Notified: Not Applicable (N/A)  Equipment Needs: Not Applicable (N/A)  Vas Lab Requested: Not Applicable (N/A)  Patient Contacted: 2/18/25 - [facility]    Diagnosis: N18.6  Procedure/ CPT Code(s): REVISION of Arteriovenous Graft withOUT Thrombosis // CPT: 17552    For varicose vein related procedures:   Last LEVDR: Not Applicable (N/A)  CEAP Classification: Not Applicable (N/A)  VCSS: Not Applicable (N/A)    Post Operative Date/ Time: 3/28/25 , 10a [place] ([place]) with Scout James (NPI: [id number])     *Please review medication hold(s), PATs, and check H&P with patient.*  PATIENT WAS MAILED SURGERY/SHOWERING/DISCHARGE/COVID INSTRUCTIONS AFTER REVIEWING WITH THEM VIA PHONE CALL.

## 2025-02-24 NOTE — PROGRESS NOTES
Progress Note - Cardiology Office  [facility]    Joaquin Frankel 44 y.o. male MRN: [id number]  : 1980  Encounter: [id number]      Assessment & Plan  Pre-operative cardiovascular examination    Primary hypertension    ESRD on dialysis ([facility])  Lab Results   Component Value Date    EGFR 7 2025    EGFR 5 2024    EGFR 5 2024    CREATININE 7.99 (H) 2025    CREATININE 9.84 (H) 2024    CREATININE 10.69 (H) 2024     Dyslipidemia    Type 2 diabetes mellitus with chronic kidney disease on chronic dialysis, without long-term current use of insulin ([facility])    Lab Results   Component Value Date    HGBA1C 5.8 (H) 10/24/2024     Coronary artery disease involving native coronary artery of native heart without angina pectoris    Status post coronary artery stent placement       ASSESSMENT:  Perioperative cardiac risk assessment for left upper arm AV fistula elevation under general anesthesia on 2025    ESRD on HD    Pulmonary embolism, subsegmental right lower lobe  CTA chest 2024  Subsegmental right lower lobe pulmonary embolism.  No CT evidence of right heart strain.  Anticoagulated with warfarin, managed by PCP Dr. Mcfarland        Coronary artery disease,   S/p NSTEMI 2020,   s/p PCI of RCA on 2020,  History of PCI of LCX in   Denies any chest pain or dyspnea     TTE:  EF 58%, moderate LVH, G1 DD,  Moderate biatrial enlargement, mild tricuspid regurgitation, RSVP 58 mmHg     Hypertension  BP is 122/62 with heart rate of 87/min     Hyperlipidemia:  2024 , , HDL 28  On atorvastatin 80 mg  2024: LDL 68, , HDL 25, normal AST and ALT     Type 2 diabetes mellitus:     History of Left groin cellulitis/mass:  S/p excision of left groin mass with skin grafting     Chronic Tobacco Abuse  Quit Smoking about 3 years ago     Morbid obesity: BMI 58.23     Chronic LLE lymphedema  Followed by vascular surgery     Anemia of  "chronic disease  Monoclonal gammopathy  S/p bone marrow biopsy           RECOMMENDATIONS:   Patient has intermediate perioperative cardiac risk for above planned vascular surgery.  There is no cardiac contraindication to the surgery.  His cardiac status appears to be optimized.  He is hemodynamically stable and his EKG is normal.     Patient already has been informed to hold Coumadin for 5 days preop.  If needed can also hold aspirin for 5 days before the surgery and then resume once cleared by vascular surgery postop     Continue cardiac medications including aspirin, atorvastatin, Coreg, Imdur, and nifedipine.  Diuretic is being managed by nephrology  Warfarin is being managed by PCP         Please call [phone] if any questions.    HPI :     Joaquin Frankel is a 44 y.o. year old male who came for perioperative cardiac risk assessment prior to undergoing AV fistula elevation under general anesthesia.  He denies any cardiac symptoms like chest pain, unusual dyspnea or syncope  He is currently hemodynamically stable and his EKG is normal    REVIEW OF SYSTEMS:  Denies chest pain, any increased or unusual dyspnea, palpitations or syncope      Historical Information   Past Medical History:   Diagnosis Date    Acute hypoxic respiratory failure ([facility]) 10/07/2023    Arthritis     Breathing difficulty     Cellulitis 10/07/2023    Chronic kidney disease     end stage renal disease    Coronary artery disease     Diabetes mellitus ([facility])     Diabetic neuropathy ([facility]) 05/28/2021    Heart disease     CAD   s/p ptca with 1 stent 2012    Hidradenitis suppurativa     groin    History of transfusion     Hyperlipidemia     Hypertension     MI (myocardial infarction) ([facility])     \" silent \" M.I. in the past    Morbid obesity with BMI of 50.0-59.9, adult ([facility])     Nicotine dependence     Obesity     PE (pulmonary thromboembolism) ([facility]) 06/2024    small on left side, takes coumadin    Pilonidal cyst     Type 1 non-ST elevation " myocardial infarction (NSTEMI) ([facility]) 11/29/2020     Past Surgical History:   Procedure Laterality Date    CARDIAC SURGERY      CORONARY ANGIOPLASTY WITH STENT PLACEMENT      x2    INCISION AND DRAINAGE OF WOUND N/A 01/24/2017    Procedure: INCISION AND DRAINAGE (I&D) BUTTOCK, PILONIDAL CYST;  Surgeon: Simba Adhikari MD;  Location: [facility];  Service:     IR BIOPSY BONE MARROW  06/12/2024    IR BIOPSY KIDNEY RANDOM  07/12/2024    IR BIOPSY KIDNEY RANDOM  07/19/2024    IR TEMPORARY DIALYSIS CATHETER PLACEMENT  06/06/2024    IR TUNNELED CENTRAL LINE PLACEMENT  06/14/2024    IR TUNNELED DIALYSIS CATHETER CHECK/CHANGE/REPOSITION/ANGIOPLASTY  1/2/2025    IR TUNNELED DIALYSIS CATHETER CHECK/CHANGE/REPOSITION/ANGIOPLASTY  2/5/2025    LEG SURGERY Left     I&D of left leg 2012. Had resp failure and had to be intubated during procedure.    RI ARTERIOVENOUS ANASTOMOSIS OPEN DIRECT Left 09/26/2024    Procedure: left arm radio-cephalic AVF creation;  Surgeon: Scout James DO;  Location: [facility];  Service: Vascular    RI ARTERIOVENOUS ANASTOMOSIS OPEN DIRECT Left 12/23/2024    Procedure: left arm brachio-basilic AVF creation;  Surgeon: Scout James DO;  Location: [facility];  Service: Vascular    RI DEBRIDEMENT SUBCUTANEOUS TISSUE 1ST 20 SQ CM/< Left 07/06/2021    Procedure: DEBRIDEMENT WOUND (WASH OUT);  Surgeon: Sudheer Mcfarlane MD;  Location:  MAIN OR;  Service: General    RI EXC B9 LESION MRGN XCP SK TG T/A/L >4.0 CM Left 04/06/2021    Procedure: EXCISION THIGH MASS;  Surgeon: Sudheer Mcfarlane MD;  Location: [facility];  Service: General    RI EXCISION H/P/P/U COMPLEX REPAIR Left 07/06/2021    Procedure: EXCISION PERINEAL ABSCESS LEFT THIGH;  Surgeon: Sudheer Mcfarlane MD;  [facility]: [facility];  Service: General    RI NEGATIVE PRESSURE WOUND THERAPY DME <= 50 SQ CM Left 04/06/2021    Procedure: APPLICATION VAC DRESSING THIGH;  Surgeon: Sudheer Mcfarlane MD;  [facility];  Service: General    WOUND DEBRIDEMENT Left 04/17/2021     Procedure: DEBRIDEMENT WOUND AND DRESSING CHANGE (WASH OUT);  Surgeon: Mahin Traore DO;  [facility];  Service: General    WOUND DEBRIDEMENT Left 04/22/2021    Procedure: DEBRIDEMENT LOWER EXTREMITY (WASH OUT), left groin and thigh;  Surgeon: Sudheer Mcfarlane MD;  [facility];  Service: General    WOUND DEBRIDEMENT Left 05/26/2021    Procedure: DEBRIDEMENT LOWER EXTREMITY (WASH OUT);  Surgeon: Zak Castanon DO;  [facility];  Service: General    WOUND DEBRIDEMENT Left 05/28/2021    Procedure: Washout left thigh wound and closure;  Surgeon: Amor Jaramillo DO;  [facility];  Service: General     Social History     Substance and Sexual Activity   Alcohol Use Not Currently    Comment: rarely     Social History     Substance and Sexual Activity   Drug Use No     Social History     Tobacco Use   Smoking Status Former    Current packs/day: 0.00    Average packs/day: 1.5 packs/day for 20.0 years (29.9 ttl pk-yrs)    Types: Cigarettes    Start date: 01/2021    Quit date: 03/2021    Years since quitting: 3.9    Passive exposure: Past   Smokeless Tobacco Never     Family History:   Family History   Problem Relation Age of Onset    Heart disease Father     Diabetes Father     Hypertension Mother     Heart disease Mother        Meds/Allergies     Allergies   Allergen Reactions    Keflex [Cephalexin] Facial Swelling and Lip Swelling    Amoxicillin Hives     childhood       Current Outpatient Medications:     aspirin (ECOTRIN LOW STRENGTH) 81 mg EC tablet, Take 1 tablet (81 mg total) by mouth daily, Disp: 90 tablet, Rfl: 2    atorvastatin (LIPITOR) 80 mg tablet, Take 1 tablet (80 mg total) by mouth daily, Disp: 90 tablet, Rfl: 3    b complex-vitamin C-folic acid (RENAL) 1 mg, Take 1 capsule by mouth daily with dinner, Disp: 30 capsule, Rfl: 8    calcium acetate (PHOSLO) capsule, Take 2 capsules (1,334 mg total) by mouth 3 (three) times a day, Disp: 540 capsule, Rfl: 1    carvedilol  (COREG) 6.25 mg tablet, Take 1 tablet (6.25 mg total) by mouth 2 (two) times a day with meals, Disp: 180 tablet, Rfl: 1    Cholecalciferol (VITAMIN D3) 1,000 units tablet, Take 2 tablets (2,000 Units total) by mouth daily, Disp: 60 tablet, Rfl: 5    isosorbide mononitrate (IMDUR) 60 mg 24 hr tablet, Take 1 tablet (60 mg total) by mouth daily, Disp: 90 tablet, Rfl: 2    metolazone (ZAROXOLYN) 10 mg tablet, Take 1 tablet (10 mg total) by mouth 4 (four) times a week, Disp: 50 tablet, Rfl: 1    NIFEdipine (PROCARDIA XL) 30 mg 24 hr tablet, Take 1 tablet (30 mg total) by mouth daily at bedtime, Disp: 90 tablet, Rfl: 1    sevelamer (RENAGEL) 800 mg tablet, Take 1 tablet (800 mg total) by mouth 3 (three) times a day with meals, Disp: 540 tablet, Rfl: 1    torsemide (DEMADEX) 100 mg tablet, Take 1 tablet (100 mg total) by mouth 4 (four) times a week, Disp: 50 tablet, Rfl: 1    warfarin (COUMADIN) 10 mg tablet, Take 1.5 tablets (15 mg total) by mouth daily, Disp: 135 tablet, Rfl: 5    Vitals: Blood pressure 122/62, pulse 87, weight (!) 174 kg (383 lb), SpO2 99%.    Body mass index is 58.23 kg/m².  Vitals:    02/25/25 1342   Weight: (!) 174 kg (383 lb)     BP Readings from Last 3 Encounters:   02/25/25 122/62   02/14/25 122/72   02/05/25 143/64       Physical Exam:  Physical Exam    Neurologic:  Alert & oriented x 3, no new focal deficits, Not in any acute distress,  Constitutional: Morbidly obese  Eyes:  Pupil equal and reacting to light, conjunctiva normal,   HENT:  Atraumatic, oropharynx moist, Neck- normal range of motion, no tenderness,  Neck supple, No JVP, No LNP   Respiratory:  Bilateral air entry, mostly clear to auscultation  Cardiovascular: S1-S2 regular with a I/VI systolic murmur   GI:  Soft, nondistended, normal bowel sounds, nontender, no hepatosplenomegaly appreciated.  Musculoskeletal:  No tenderness, no deformities.   Skin:  Well hydrated, no rash   Lymphatic:  No lymphadenopathy noted   Extremities: Mild  lower extremity edema        Diagnostic Studies Review Cardio:      EKG: Normal sinus rhythm, heart rate 87/min    Cardiac testing:   Results for orders placed during the hospital encounter of 21    Echo complete with contrast if indicated    Narrative  [address] [id number] [phone]    Transthoracic Echocardiogram  2D, M-mode, Doppler, and Color Doppler    Study date:  2021    Patient: MORELIA MASSEY  MR number: [id number]  Account number: [id number]  : 1980  Age: 40 years  Gender: Male  Status: Inpatient  Location: Bedside  Height: 66 in  Weight: 369.2 lb  BP: 100/ 58 mmHg    Indications: Assess left ventricular function.    Diagnoses: J96.91 - Respiratory failure, unspecified with hypoxia    Sonographer:  SIMEON Barnes  Primary Physician:  Heydi Norman MD  Referring Physician:  Lauryn Ullrich,DO  Group:  [facility]  Cardiology Fellow:  Simba Rm MD  Interpreting Physician:  Irving Arnett MD    SUMMARY    PROCEDURE INFORMATION:  This was a technically difficult study.  Intravenous contrast ( 0.6 ml Definity/NSS) was administered.    LEFT VENTRICLE:  Systolic function was normal. Ejection fraction was estimated to be 65 %.  There were no regional wall motion abnormalities.    RIGHT VENTRICLE:  The size was normal.  Systolic function was normal.    HISTORY: PRIOR HISTORY: Respiratory failure with hypoxia, CAD s/p stent, Hyperlipidemia    PROCEDURE: The procedure was performed at the bedside. This was a routine study. The transthoracic approach was used. The study included complete 2D imaging, M-mode, complete spectral Doppler, and color Doppler. The heart rate was 66 bpm,  at the start of the study. Images were obtained from the parasternal, apical, subcostal, and suprasternal notch acoustic windows. Intravenous contrast ( 0.6 ml Definity/NSS) was administered. Echocardiographic  views were limited due to  decreased penetration and patient on mechanical ventilator. This was a technically difficult study.    LEFT VENTRICLE: Size was normal. Systolic function was normal. Ejection fraction was estimated to be 65 %. There were no regional wall motion abnormalities. Wall thickness was normal. There was no evidence of concentric hypertrophy.  DOPPLER: The transmitral flow pattern was normal. The study was not technically sufficient to allow evaluation of LV diastolic function.    RIGHT VENTRICLE: The size was normal. Systolic function was normal.    LEFT ATRIUM: Size was normal.    RIGHT ATRIUM: Size was normal.    MITRAL VALVE: Valve structure was normal. There was normal leaflet separation. DOPPLER: The transmitral velocity was within the normal range. There was no evidence for stenosis. There was no significant regurgitation.    AORTIC VALVE: The valve was probably trileaflet. Leaflets exhibited normal thickness and normal cuspal separation. DOPPLER: Transaortic velocity was within the normal range. There was no evidence for stenosis. There was no regurgitation.    TRICUSPID VALVE: The valve structure was normal. There was normal leaflet separation. DOPPLER: The transtricuspid velocity was within the normal range. There was no evidence for stenosis. There was trace regurgitation. The tricuspid jet  envelope definition was inadequate for estimation of RV systolic pressure.    PULMONIC VALVE: Leaflets exhibited normal thickness, no calcification, and normal cuspal separation. DOPPLER: The transpulmonic velocity was within the normal range. There was no evidence for stenosis. There was no significant  regurgitation.    PERICARDIUM: There was no pericardial effusion.    AORTA: The root exhibited normal size.    SYSTEMIC VEINS: IVC: The inferior vena cava was dilated. Respirophasic changes in dimension were absent.    SYSTEM MEASUREMENT TABLES    2D  %FS: 40.76 %  Ao Diam: 3.12 cm  EDV(Teich):  125.68 ml  EF(Teich): 71.22 %  ESV(Teich): 36.17 ml  IVSd: 0.96 cm  LA Area: 18.82 cm2  LA Diam: 4.09 cm  LVEDV MOD A4C: 139.97 ml  LVEF MOD A4C: 73.53 %  LVESV MOD A4C: 37.05 ml  LVIDd: 5.13 cm  LVIDs: 3.04 cm  LVLd A4C: 8.89 cm  LVLs A4C: 6.99 cm  LVPWd: 0.97 cm  RA Area: 19.01 cm2  RVIDd: 3.58 cm  SV MOD A4C: 102.92 ml  SV(Teich): 89.51 ml    MM  TAPSE: 2.34 cm    PW  E' Sept: 0.07 m/s  E/E' Sept: 7.73  MV A Flavio: 0.38 m/s  MV Dec Garfield: 2.89 m/s2  MV DecT: 197.93 ms  MV E Flavio: 0.57 m/s  MV E/A Ratio: 1.5  MV PHT: 57.4 ms  MVA By PHT: 3.83 cm2    [facility] Accredited Echocardiography Laboratory    Prepared and electronically signed by    Irving Arnett MD  Signed 07-Apr-2021 10:12:03      Results for orders placed during the hospital encounter of 06/03/24    Echo complete w/ contrast if indicated    Interpretation Summary    Left Ventricle: Left ventricular cavity size is normal. Wall thickness is moderately increased. There is moderate concentric hypertrophy. The left ventricular ejection fraction is 58% by visual estimation. Systolic function is normal. Wall motion is normal. Diastolic function is mildly abnormal, consistent with grade I (abnormal) relaxation.  Left atrial filling pressure is normal.    Left Atrium: The atrium is mildly dilated.    Right Atrium: The atrium is mildly dilated.    Mitral Valve: There is mild regurgitation.    Tricuspid Valve: There is mild regurgitation. The right ventricular systolic pressure is moderately elevated. The estimated right ventricular systolic pressure is 58.00 mmHg.    Pulmonic Valve: There is mild regurgitation.    Compared to previous exam, there is no significant change.        Results for orders placed during the hospital encounter of 11/29/20    Cardiac catheterization    Narrative  [address] [address] [phone]    ([place])    Invasive Cardiovascular Lab Complete Report    Patient:  MORELIA MASSEY  MR number: [id number]  Account number: [id number]  Study date: 2020  Gender: Male  : 1980  Height: 68.9 in  Weight: 374 lb  BSA: 2.69 mï¾²    Allergies: AMOXICILLIN    Diagnostic Cardiologist:  Jhon Mejias DO  Interventional Cardiologist:  Jhon Mejias DO    SUMMARY    CORONARY CIRCULATION:  There was 1-vessel coronary artery disease.  Mid RCA: There was a 80 % stenosis.    1ST LESION INTERVENTIONS:  A balloon angioplasty with stent procedure was performed on the 80 % lesion in the mid RCA. Following intervention there was a 0 % residual stenosis.  A Resolute [place] Rx 3.0 x 12mm drug-eluting stent was placed across the lesion and deployed at a maximum inflation pressure of 14 sarah.    INDICATIONS:  --  Possible CAD: unstable angina.    PROCEDURES PERFORMED    --  Left coronary angiography.  --  Right coronary angiography.  --  Inpatient.  --  Mod Sedation Same Physician Initial 15min.  --  Mod Sedation Same Physician Add 15min.  --  Coronary Catheterization (w/o [facility]).  --  Mod Sedation Same Physician Add 15min.  --  Coronary Drug Eluting Stent w/PTCA.  --  Intervention on mid RCA: balloon angioplasty, stent.    PROCEDURE: The risks and alternatives of the procedures and conscious sedation were explained to the patient and informed consent was obtained. The patient was brought to the cath lab and placed on the table. The planned puncture sites  were prepped and draped in the usual sterile fashion.    --  Right radial artery access. After performing an Ravi's test to verify adequate ulnar artery supply to the hand, the radial site was prepped. The puncture site was infiltrated with local anesthetic. The vessel was accessed using the  modified Seldinger technique, a wire was advanced into the vessel, and a sheath was advanced over the wire into the vessel.    --  Left coronary artery angiography. A catheter was advanced over a guidewire into the aorta and  positioned in the left coronary artery ostium under fluoroscopic guidance. Angiography was performed.    --  Right coronary artery angiography. A catheter was advanced over a guidewire into the aorta and positioned in the right coronary artery ostium under fluoroscopic guidance. Angiography was performed.    --  Inpatient.    --  Mod Sedation Same Physician Initial 15min.    --  Mod Sedation Same Physician Add 15min.    --  Coronary Catheterization (w/o [facility]).    --  Mod Sedation Same Physician Add 15min.    LESION INTERVENTION: A balloon angioplasty with stent procedure was performed on the 80 % lesion in the mid RCA. Following intervention there was a 0 % residual stenosis. There was HERNAN 3 flow before the procedure and HERNAN 3 flow after the  procedure.    --  Vessel setup was performed. A 6Fr. Launcher AR1 guiding catheter was used to cannulate the vessel.    --  Vessel setup was performed. A Runthrough NS 180cm wire was used to cross the lesion.    --  Balloon angioplasty was performed, using a Trek Rx 2.5 x 12mm balloon, with 2 inflations and a maximum inflation pressure of 12 sarah.    --  A Resolute Joshua Rx 3.0 x 12mm drug-eluting stent was placed across the lesion and deployed at a maximum inflation pressure of 14 sarah.    INTERVENTIONS:  --  Coronary Drug Eluting Stent w/PTCA.    PROCEDURE COMPLETION: The patient tolerated the procedure well and was discharged from the cath lab. TIMING: Test started at 11:08. Test concluded at 11:49. HEMOSTASIS: The sheath was removed. The site was compressed with a Hemoband  device. Hemostasis was obtained. MEDICATIONS GIVEN: Versed (2mg/2ml), 2 mg, IV, at 11:02. Fentanyl (1OOmcg/2 ml), 50 mcg, IV, at 11:02. 1% Lidocaine, 1 ml, subcutaneously, at 11:09. Nitroglycerin (200mcg/ml), 200 mcg, at 11:13. Verapamil  (5mg/2ml), 2.5 mg, IV, at 11:14. Heparin 1000 units/ml, 4,000 units, IV, at 11:14. Fentanyl (1OOmcg/2 ml), 50 mcg, IV, at 11:14. Versed (2mg/2ml), 1 mg, IV, at 11:14.  Ticagrelor, 180 mg, PO, at 11:28. Heparin 1000 units/ml, 8,000 units,  IV, at 11:29. Versed (2mg/2ml), 1 mg, IV, at 11:39. Fentanyl (1OOmcg/2 ml), 25 mcg, IV, at 11:39. Fentanyl (1OOmcg/2 ml), 25 mcg, IV, at 11:41. Heparin 1000 units/ml, 2,000 units, IV, at 11:49. CONTRAST GIVEN: 130 ml Omnipaque (350 mg I  /ml). RADIATION EXPOSURE: Fluoroscopy time: 9.95 min.    CORONARY VESSELS:   --  The coronary circulation is right dominant.  --  There was 1-vessel coronary artery disease.  --  Left main: Angiography showed minor luminal irregularities.  --  LAD: Angiography showed minor luminal irregularities.  --  Mid circumflex: There was a 30 % stenosis.  --  RCA: The vessel was excessively tortuous.  --  Proximal RCA: There was a 30 % stenosis.  --  Mid RCA: There was a 80 % stenosis.  --  Distal RCA: There was a 40 % stenosis.    IMPRESSIONS:  1V CAD/RCA with SABINE placement mid RCA    RECOMMENDATIONS  weight loss, smoking cessation, DM control, dapt x 1 year, gdmt cad    DISPOSITION:  The patient left the catheterization laboratory in stable condition.    Prepared and signed by    Jhon Mejias DO  Signed 12/01/2020 11:54:00    Study diagram    Angiographic findings  Native coronary lesions:  ï¾·Mid circumflex: Lesion 1: 30 % stenosis.  ï¾·Proximal RCA: Lesion 1: 30 % stenosis.  ï¾·Mid RCA: Lesion 1: 80 % stenosis.  ï¾·Distal RCA: Lesion 1: 40 % stenosis.  Intervention results  Native coronary lesions:  ï¾·balloon angioplasty and stent of the 80 % stenosis in mid RCA. 0 % residual stenosis. Stent: Resolute Joshua Rx 3.0 x 12mm drug-eluting.    Hemodynamic tables    Pressures:  Baseline  Pressures:  - HR: 77  Pressures:  - Rhythm:  Pressures:  -- Aortic Pressure (S/D/M): 160/100/126    Outputs:  Baseline  Outputs:  -- CALCULATIONS[age]  Outputs:  -- CALCULATIONS: Body Surface Area: 2.69  Outputs:  -- CALCULATIONS: Height in cm: 175.00  Outputs:  -- CALCULATIONS: Sex: Male  Outputs:  -- CALCULATIONS:  "Weight in k.00        Results for orders placed during the hospital encounter of 24    NM myocardial perfusion spect (rx stress and/or rest)    Interpretation Summary    Stress ECG: The stress ECG is equivocal for ischemia after pharmacologic vasodilation.    Perfusion: There is a left ventricular perfusion defect that is small to medium in size present in the apical location(s) that is predominantly fixed.    Stress Combined Conclusion: Left ventricular perfusion is probably abnormal.    Stress Function: Left ventricular function post-stress is normal. Stress ejection fraction is 58%.    Perfusion Defect Conclusion: The stress/rest perfusion ratio is 1.10. There is no evidence of transient ischemic dilation (TID).    Abnormal study. Small predominantly fixed apical defect. SSS 3 SRS 1 SDS 2. No major reversible perfusion defect noted on this study. Elevated BP noted during the they study.          Imaging:  Chest X-Ray:   XR chest pa and lateral  Result Date: 10/24/2024  Impression IMPRESSION: 1.  No acute cardiopulmonary process. Workstation:[id number]      CT-scan of the chest:     No CTA results available for this patient.  Lab Review   Lab Results   Component Value Date    WBC 8.52 2025    HGB 7.9 (L) 2025    HCT 23.1 (L) 2025    MCV 96 2025    RDW 12.5 2025     2025     BMP:  Lab Results   Component Value Date    SODIUM 136 2025    K 4.4 2025    CL 95 (L) 2025    CO2 31 2025    BUN 50 (H) 2025    CREATININE 7.99 (H) 2025    GLUC 135 2025    GLUF 93 2024    CALCIUM 8.6 2025    CORRECTEDCA 7.9 (L) 2024    EGFR 7 2025    MG 1.9 10/24/2024     LFT:  Lab Results   Component Value Date    AST 18 2025    ALT 16 2025    ALKPHOS 75 2025    TP 7.9 2025    TP 7.3 2025    ALB 3.8 2025      No components found for: \"TSH3\"  Lab Results   Component Value Date    " "[id number] 2.784 03/30/2022     Lab Results   Component Value Date    HGBA1C 5.8 (H) 10/24/2024     Lipid Profile:   Lab Results   Component Value Date    CHOLESTEROL 122 09/11/2024    HDL 25 (L) 09/11/2024    LDLCALC 68 09/11/2024    TRIG 147 09/11/2024     Lab Results   Component Value Date    CHOLESTEROL 122 09/11/2024    CHOLESTEROL 101 08/03/2024     Lab Results   Component Value Date    CKTOTAL 267 06/03/2024    TROPONINI <0.02 05/26/2021     Lab Results   Component Value Date    NTBNP 203 (H) 12/02/2021      No results found for this or any previous visit (from the past 4 weeks).          Dr. Gino Fulton MD, FACC      \"This note has been constructed using a voice recognition system.Therefore there may be syntax, spelling, and/or grammatical errors. Please call if you have any questions. \"  "

## 2025-02-25 ENCOUNTER — OFFICE VISIT (OUTPATIENT)
Dept: CARDIOLOGY CLINIC | Facility: CLINIC | Age: 45
End: 2025-02-25
Payer: MEDICARE

## 2025-02-25 VITALS
DIASTOLIC BLOOD PRESSURE: 62 MMHG | HEART RATE: 87 BPM | SYSTOLIC BLOOD PRESSURE: 122 MMHG | OXYGEN SATURATION: 99 % | WEIGHT: 315 LBS | BODY MASS INDEX: 58.23 KG/M2

## 2025-02-25 DIAGNOSIS — I25.10 CORONARY ARTERY DISEASE INVOLVING NATIVE CORONARY ARTERY OF NATIVE HEART WITHOUT ANGINA PECTORIS: ICD-10-CM

## 2025-02-25 DIAGNOSIS — E78.5 DYSLIPIDEMIA: ICD-10-CM

## 2025-02-25 DIAGNOSIS — Z99.2 ESRD ON DIALYSIS (HCC): ICD-10-CM

## 2025-02-25 DIAGNOSIS — Z01.810 PRE-OPERATIVE CARDIOVASCULAR EXAMINATION: Primary | ICD-10-CM

## 2025-02-25 DIAGNOSIS — N18.6 TYPE 2 DIABETES MELLITUS WITH CHRONIC KIDNEY DISEASE ON CHRONIC DIALYSIS, WITHOUT LONG-TERM CURRENT USE OF INSULIN (HCC): ICD-10-CM

## 2025-02-25 DIAGNOSIS — N18.6 ESRD ON DIALYSIS (HCC): ICD-10-CM

## 2025-02-25 DIAGNOSIS — Z99.2 TYPE 2 DIABETES MELLITUS WITH CHRONIC KIDNEY DISEASE ON CHRONIC DIALYSIS, WITHOUT LONG-TERM CURRENT USE OF INSULIN (HCC): ICD-10-CM

## 2025-02-25 DIAGNOSIS — E11.22 TYPE 2 DIABETES MELLITUS WITH CHRONIC KIDNEY DISEASE ON CHRONIC DIALYSIS, WITHOUT LONG-TERM CURRENT USE OF INSULIN (HCC): ICD-10-CM

## 2025-02-25 DIAGNOSIS — I10 PRIMARY HYPERTENSION: ICD-10-CM

## 2025-02-25 DIAGNOSIS — Z95.5 STATUS POST CORONARY ARTERY STENT PLACEMENT: ICD-10-CM

## 2025-02-25 PROCEDURE — 93000 ELECTROCARDIOGRAM COMPLETE: CPT | Performed by: INTERNAL MEDICINE

## 2025-02-25 PROCEDURE — 99214 OFFICE O/P EST MOD 30 MIN: CPT | Performed by: INTERNAL MEDICINE

## 2025-02-26 ENCOUNTER — ANESTHESIA EVENT (OUTPATIENT)
Dept: PERIOP | Facility: HOSPITAL | Age: 45
End: 2025-02-26
Payer: COMMERCIAL

## 2025-02-26 NOTE — PRE-PROCEDURE INSTRUCTIONS
Pre-Surgery Instructions:   Medication Instructions    aspirin (ECOTRIN LOW STRENGTH) 81 mg EC tablet Take day of surgery.    atorvastatin (LIPITOR) 80 mg tablet Take night before surgery    b complex-vitamin C-folic acid (RENAL) 1 mg Take night before surgery    calcium acetate (PHOSLO) capsule Hold day of surgery.    carvedilol (COREG) 6.25 mg tablet Take day of surgery.    Cholecalciferol (VITAMIN D3) 1,000 units tablet Hold day of surgery.    isosorbide mononitrate (IMDUR) 60 mg 24 hr tablet Take day of surgery.    metolazone (ZAROXOLYN) 10 mg tablet Hold day of surgery.    NIFEdipine (PROCARDIA XL) 30 mg 24 hr tablet Take day of surgery.    sevelamer (RENAGEL) 800 mg tablet Hold day of surgery.    torsemide (DEMADEX) 100 mg tablet Hold day of surgery.    warfarin (COUMADIN) 10 mg tablet Per surgeons office last dose is 2/28/25 pt aware     Medication instructions for day of surgery reviewed. Please take all instructed medications with only a sip of water.       You will receive a call one business day prior to surgery with an arrival time and hospital directions. If your surgery is scheduled on a Monday, the hospital will be calling you on the Friday prior to your surgery. If you have not heard from anyone by 8pm, please call the hospital supervisor through the hospital  at [phone]. ([place] [phone] or [place] [phone]).    Do not eat or drink anything after midnight the night before your surgery, including candy, mints, lifesavers, or chewing gum. Do not drink alcohol 24hrs before your surgery. Try not to smoke at least 24hrs before your surgery.       Follow the pre surgery showering instructions as listed in the “My Surgical Experience Booklet” or otherwise provided by your surgeon's office. Do not use a blade to shave the surgical area 1 week before surgery. It is okay to use a clean electric clippers up to 24 hours before surgery. Do not apply any lotions, creams, including makeup,  cologne, deodorant, or perfumes after showering on the day of your surgery. Do not use dry shampoo, hair spray, hair gel, or any type of hair products.     No contact lenses, eye make-up, or artificial eyelashes. Remove nail polish, including gel polish, and any artificial, gel, or acrylic nails if possible. Remove all jewelry including rings and body piercing jewelry.     Wear causal clothing that is easy to take on and off. Consider your type of surgery.    Keep any valuables, jewelry, piercings at home. Please bring any specially ordered equipment (sling, braces) if indicated.    Arrange for a responsible person to drive you to and from the hospital on the day of your surgery. Please confirm the visitor policy for the day of your procedure when you receive your phone call with an arrival time.     Call the surgeon's office with any new illnesses, exposures, or additional questions prior to surgery.    Please reference your “My Surgical Experience Booklet” for additional information to prepare for your upcoming surgery.

## 2025-03-03 ENCOUNTER — HOSPITAL ENCOUNTER (EMERGENCY)
Facility: HOSPITAL | Age: 45
Discharge: HOME | End: 2025-03-03
Payer: MEDICARE

## 2025-03-03 ENCOUNTER — APPOINTMENT (EMERGENCY)
Dept: RADIOLOGY | Facility: HOSPITAL | Age: 45
End: 2025-03-03
Payer: MEDICARE

## 2025-03-03 VITALS
DIASTOLIC BLOOD PRESSURE: 72 MMHG | WEIGHT: 315 LBS | OXYGEN SATURATION: 96 % | SYSTOLIC BLOOD PRESSURE: 142 MMHG | RESPIRATION RATE: 18 BRPM | TEMPERATURE: 97.2 F | BODY MASS INDEX: 58.23 KG/M2 | HEART RATE: 106 BPM

## 2025-03-03 DIAGNOSIS — M79.674 TOE PAIN, RIGHT: Primary | ICD-10-CM

## 2025-03-03 DIAGNOSIS — S92.414A CLOSED NONDISPLACED FRACTURE OF PROXIMAL PHALANX OF RIGHT GREAT TOE, INITIAL ENCOUNTER: Primary | ICD-10-CM

## 2025-03-03 PROCEDURE — 73660 X-RAY EXAM OF TOE(S): CPT

## 2025-03-03 PROCEDURE — 99284 EMERGENCY DEPT VISIT MOD MDM: CPT

## 2025-03-03 PROCEDURE — 99283 EMERGENCY DEPT VISIT LOW MDM: CPT

## 2025-03-03 RX ORDER — OXYCODONE HYDROCHLORIDE 5 MG/1
5 TABLET ORAL EVERY 4 HOURS PRN
Qty: 15 TABLET | Refills: 0 | Status: SHIPPED | OUTPATIENT
Start: 2025-03-03 | End: 2025-03-13

## 2025-03-03 RX ORDER — OXYCODONE HYDROCHLORIDE 5 MG/1
5 TABLET ORAL ONCE
Refills: 0 | Status: COMPLETED | OUTPATIENT
Start: 2025-03-03 | End: 2025-03-03

## 2025-03-03 RX ADMIN — OXYCODONE HYDROCHLORIDE 5 MG: 5 TABLET ORAL at 20:33

## 2025-03-03 NOTE — PROGRESS NOTES
Patient was seen on HD today.   Complaining of worsening R toe pain.   No reported trauma.   Will check X-ray and refer to podiatry.   Advised to go to ER if gets worse.   Problem: Safety  Goal: Will remain free from injury  Outcome: PROGRESSING AS EXPECTED  Call light within reach. Bed alarm on. Hourly rounding completed.     Problem: Infection  Goal: Will remain free from infection  Outcome: PROGRESSING AS EXPECTED  Hand and personal hygiene promoted. All precautions maintained in and out of rooms.

## 2025-03-04 ENCOUNTER — PATIENT OUTREACH (OUTPATIENT)
Dept: CASE MANAGEMENT | Facility: OTHER | Age: 45
End: 2025-03-04

## 2025-03-04 ENCOUNTER — RESULTS FOLLOW-UP (OUTPATIENT)
Dept: EMERGENCY DEPT | Facility: HOSPITAL | Age: 45
End: 2025-03-04

## 2025-03-04 NOTE — PROGRESS NOTES
"Outpatient Care Management Note:  Outreach call placed to Mr. Frankel.  Introduced myself and the role of care management.      Mr. Frankel is following the instructions from the emergency room.  He is staying off his foot as much as he is able and taking prescribed pain medication.  States \"there is really nothing else that can be done for it. \"      Mr. Frankel attends HD three times weekly and his DM is well controlled.  Denies any needs at this time.    "

## 2025-03-04 NOTE — PROGRESS NOTES
Outpatient Care Management Note:  Referral for complex care management services received after recent ER visit.  Chart review completed.     History includes CAD with stenting,  HTN, MI, PE, ANGY, ESRD on HD, DM, and MGUS,     3/3/2025-Presented to ER from HD after complaining of increasing (R) toe pain.  Attempted to use outpatient radiology but they were closed when he arrived.   X-Ray showed possible non displaced fractureof the 1st proximal phalanx vs a nutrient foramen.  Discharged on pain medication.

## 2025-03-04 NOTE — ED PROVIDER NOTES
"  ED Disposition       None          Assessment & Plan   {Hyperlinks  Risk Stratification - NIHSS - HEART SCORE - Fill out sepsis note and make sure you call [phone] if severe or septic shock:[id number]}    Medical Decision Making         Medications - No data to display    ED Risk Strat Scores                            SBIRT 20yo+      Flowsheet Row Most Recent Value   Initial Alcohol Screen: US AUDIT-C     1. How often do you have a drink containing alcohol? 0 Filed at: 03/03/2025 1852   2. How many drinks containing alcohol do you have on a typical day you are drinking?  0 Filed at: 03/03/2025 1852   3a. Male UNDER 65: How often do you have five or more drinks on one occasion? 0 Filed at: 03/03/2025 1852   3b. FEMALE Any Age, or MALE 65+: How often do you have 4 or more drinks on one occassion? 0 Filed at: 03/03/2025 1852   Audit-C Score 0 Filed at: 03/03/2025 1852   ALEX: How many times in the past year have you...    Used an illegal drug or used a prescription medication for non-medical reasons? Never Filed at: 03/03/2025 1852                            History of Present Illness   {Hyperlinks  History (Med, Surg, Fam, Social) - Current Medications - Allergies  :[id number]}    Chief Complaint   Patient presents with   • Foot Pain     Been having right foot pain for \"a while\" his dr put in an xray order but outpt is closed at this time so he opted to sign in to get xray that way       Past Medical History:   Diagnosis Date   • Acute hypoxic respiratory failure (HCC) 10/07/2023   • Arthritis    • Breathing difficulty    • Cellulitis 10/07/2023   • Chronic kidney disease     end stage renal disease   • Coronary artery disease    • Diabetes mellitus (HCC)    • Diabetic neuropathy (HCC) 05/28/2021   • Heart disease     CAD   s/p ptca with 1 stent 2012   • Hidradenitis suppurativa     groin   • History of transfusion    • Hyperlipidemia    • Hypertension    • MI (myocardial infarction) ([facility])     \" silent \" M.I. in " the past   • Morbid obesity with BMI of 50.0-59.9, adult ([facility])    • Nicotine dependence    • Obesity    • PE (pulmonary thromboembolism) ([facility]) 06/2024    small on left side, takes coumadin   • Pilonidal cyst    • Type 1 non-ST elevation myocardial infarction (NSTEMI) ([facility]) 11/29/2020      Past Surgical History:   Procedure Laterality Date   • CARDIAC SURGERY     • CORONARY ANGIOPLASTY WITH STENT PLACEMENT      x2   • INCISION AND DRAINAGE OF WOUND N/A 01/24/2017    Procedure: INCISION AND DRAINAGE (I&D) BUTTOCK, PILONIDAL CYST;  Surgeon: Simba Adhikari MD;  Location: [facility];  Service:    • IR BIOPSY BONE MARROW  06/12/2024   • IR BIOPSY KIDNEY RANDOM  07/12/2024   • IR BIOPSY KIDNEY RANDOM  07/19/2024   • IR TEMPORARY DIALYSIS CATHETER PLACEMENT  06/06/2024   • IR TUNNELED CENTRAL LINE PLACEMENT  06/14/2024   • IR TUNNELED DIALYSIS CATHETER CHECK/CHANGE/REPOSITION/ANGIOPLASTY  1/2/2025   • IR TUNNELED DIALYSIS CATHETER CHECK/CHANGE/REPOSITION/ANGIOPLASTY  2/5/2025   • LEG SURGERY Left     I&D of left leg 2012. Had resp failure and had to be intubated during procedure.   • SD ARTERIOVENOUS ANASTOMOSIS OPEN DIRECT Left 09/26/2024    Procedure: left arm radio-cephalic AVF creation;  Surgeon: Scout James DO;  Location: [facility];  Service: Vascular   • SD ARTERIOVENOUS ANASTOMOSIS OPEN DIRECT Left 12/23/2024    Procedure: left arm brachio-basilic AVF creation;  Surgeon: Scout James DO;  Location: [facility];  Service: Vascular   • SD DEBRIDEMENT SUBCUTANEOUS TISSUE 1ST 20 SQ CM/< Left 07/06/2021    Procedure: DEBRIDEMENT WOUND (WASH OUT);  Surgeon: Sudheer Mcfarlane MD;  [facility];  Service: General   • SD EXC B9 LESION MRGN XCP SK TG T/A/L >4.0 CM Left 04/06/2021    Procedure: EXCISION THIGH MASS;  Surgeon: Sudheer Mcfarlane MD;  [facility];  Service: General   • SD EXCISION H/P/P/U COMPLEX REPAIR Left 07/06/2021    Procedure: EXCISION PERINEAL ABSCESS LEFT THIGH;  Surgeon: Sudheer Mcfarlane MD;  Location: [facility];  Service: General   • GA NEGATIVE PRESSURE WOUND THERAPY DME <= 50 SQ CM Left 2021    Procedure: APPLICATION VAC DRESSING THIGH;  Surgeon: Sudheer Mcfarlane MD;  [facility];  Service: General   • WOUND DEBRIDEMENT Left 2021    Procedure: DEBRIDEMENT WOUND AND DRESSING CHANGE (WASH OUT);  Surgeon: Mahin Traore DO;  [facility];  Service: General   • WOUND DEBRIDEMENT Left 2021    Procedure: DEBRIDEMENT LOWER EXTREMITY (WASH OUT), left groin and thigh;  Surgeon: Sudheer Mcfarlane MD;  [facility];  Service: General   • WOUND DEBRIDEMENT Left 2021    Procedure: DEBRIDEMENT LOWER EXTREMITY (WASH OUT);  Surgeon: Zak Castanon DO;  [facility];  Service: General   • WOUND DEBRIDEMENT Left 2021    Procedure: Washout left thigh wound and closure;  Surgeon: Amor Jaramillo DO;  [facility];  Service: General      Family History   Problem Relation Age of Onset   • Heart disease Father    • Diabetes Father    • Hypertension Mother    • Heart disease Mother       Social History     Tobacco Use   • Smoking status: Former     Current packs/day: 0.00     Average packs/day: 1.5 packs/day for 20.0 years (29.9 ttl pk-yrs)     Types: Cigarettes     Start date: 2021     Quit date: 2021     Years since quittin.0     Passive exposure: Past   • Smokeless tobacco: Never   Vaping Use   • Vaping status: Never Used   Substance Use Topics   • Alcohol use: Not Currently   • Drug use: Never      E-Cigarette/Vaping   • E-Cigarette Use Never User       E-Cigarette/Vaping Substances   • Nicotine No    • THC No    • CBD No    • Flavoring No    • Other No    • Unknown No       I have reviewed and agree with the history as documented.     HPI    Review of Systems        Objective   {Hyperlinks  Historical Vitals - Historical Labs - Chart Review/Microbiology - Last Echo - Code Status  :[id number]}    ED Triage Vitals [25 1854]   Temperature Pulse Blood  Pressure Respirations SpO2 Patient Position - Orthostatic VS   (!) 97.2 °F (36.2 °C) (!) 106 142/72 18 96 % --      Temp src Heart Rate Source BP Location FiO2 (%) Pain Score    -- -- -- -- 10 - Worst Possible Pain      Vitals      Date and Time Temp Pulse SpO2 Resp BP Pain Score FACES Pain Rating User   03/03/25 1854 97.2 °F (36.2 °C) 106 96 % 18 142/72 10 - Worst Possible Pain -- ATIF            Physical Exam    Results Reviewed       None            No orders to display       Procedures    ED Medication and Procedure Management   Prior to Admission Medications   Prescriptions Last Dose Informant Patient Reported? Taking?   Cholecalciferol (VITAMIN D3) 1,000 units tablet  Self No No   Sig: Take 2 tablets (2,000 Units total) by mouth daily   NIFEdipine (PROCARDIA XL) 30 mg 24 hr tablet  Self No No   Sig: Take 1 tablet (30 mg total) by mouth daily at bedtime   aspirin (ECOTRIN LOW STRENGTH) 81 mg EC tablet  Self No No   Sig: Take 1 tablet (81 mg total) by mouth daily   atorvastatin (LIPITOR) 80 mg tablet  Self No No   Sig: Take 1 tablet (80 mg total) by mouth daily   b complex-vitamin C-folic acid (RENAL) 1 mg  Self No No   Sig: Take 1 capsule by mouth daily with dinner   calcium acetate (PHOSLO) capsule  Self No No   Sig: Take 2 capsules (1,334 mg total) by mouth 3 (three) times a day   carvedilol (COREG) 6.25 mg tablet  Self No No   Sig: Take 1 tablet (6.25 mg total) by mouth 2 (two) times a day with meals   isosorbide mononitrate (IMDUR) 60 mg 24 hr tablet  Self No No   Sig: Take 1 tablet (60 mg total) by mouth daily   metolazone (ZAROXOLYN) 10 mg tablet  Self No No   Sig: Take 1 tablet (10 mg total) by mouth 4 (four) times a week   sevelamer (RENAGEL) 800 mg tablet  Self No No   Sig: Take 1 tablet (800 mg total) by mouth 3 (three) times a day with meals   torsemide (DEMADEX) 100 mg tablet  Self No No   Sig: Take 1 tablet (100 mg total) by mouth 4 (four) times a week   warfarin (COUMADIN) 10 mg tablet  Self No No    Sig: Take 1.5 tablets (15 mg total) by mouth daily      Facility-Administered Medications: None     Patient's Medications   Discharge Prescriptions    No medications on file     No discharge procedures on file.  ED SEPSIS DOCUMENTATION          tablet  Self No No   Sig: Take 1 tablet (60 mg total) by mouth daily   metolazone (ZAROXOLYN) 10 mg tablet  Self No No   Sig: Take 1 tablet (10 mg total) by mouth 4 (four) times a week   sevelamer (RENAGEL) 800 mg tablet  Self No No   Sig: Take 1 tablet (800 mg total) by mouth 3 (three) times a day with meals   torsemide (DEMADEX) 100 mg tablet  Self No No   Sig: Take 1 tablet (100 mg total) by mouth 4 (four) times a week   warfarin (COUMADIN) 10 mg tablet  Self No No   Sig: Take 1.5 tablets (15 mg total) by mouth daily      Facility-Administered Medications: None     Discharge Medication List as of 3/3/2025  8:28 PM        START taking these medications    Details   Diclofenac Sodium (VOLTAREN) 1 % Apply 2 g topically 4 (four) times a day, Starting Mon 3/3/2025, Normal      oxyCODONE (Roxicodone) 5 immediate release tablet Take 1 tablet (5 mg total) by mouth every 4 (four) hours as needed for moderate pain for up to 10 days Max Daily Amount: 30 mg, Starting Mon 3/3/2025, Until Thu 3/13/2025 at 2359, Normal           CONTINUE these medications which have NOT CHANGED    Details   aspirin (ECOTRIN LOW STRENGTH) 81 mg EC tablet Take 1 tablet (81 mg total) by mouth daily, Starting Thu 8/29/2024, Normal      atorvastatin (LIPITOR) 80 mg tablet Take 1 tablet (80 mg total) by mouth daily, Starting Thu 8/29/2024, Normal      b complex-vitamin C-folic acid (RENAL) 1 mg Take 1 capsule by mouth daily with dinner, Starting Wed 8/21/2024, Until Sun 5/18/2025, Normal      calcium acetate (PHOSLO) capsule Take 2 capsules (1,334 mg total) by mouth 3 (three) times a day, Starting Thu 10/31/2024, Normal      carvedilol (COREG) 6.25 mg tablet Take 1 tablet (6.25 mg total) by mouth 2 (two) times a day with meals, Starting Thu 10/31/2024, Until Tue 4/29/2025, Normal      Cholecalciferol (VITAMIN D3) 1,000 units tablet Take 2 tablets (2,000 Units total) by mouth daily, Starting Wed 8/14/2024, Normal      isosorbide mononitrate (IMDUR) 60  mg 24 hr tablet Take 1 tablet (60 mg total) by mouth daily, Starting Thu 8/29/2024, Normal      metolazone (ZAROXOLYN) 10 mg tablet Take 1 tablet (10 mg total) by mouth 4 (four) times a week, Starting Thu 10/31/2024, Normal      NIFEdipine (PROCARDIA XL) 30 mg 24 hr tablet Take 1 tablet (30 mg total) by mouth daily at bedtime, Starting Thu 10/31/2024, Until Mon 7/28/2025, Normal      sevelamer (RENAGEL) 800 mg tablet Take 1 tablet (800 mg total) by mouth 3 (three) times a day with meals, Starting Thu 10/31/2024, Normal      torsemide (DEMADEX) 100 mg tablet Take 1 tablet (100 mg total) by mouth 4 (four) times a week, Starting Thu 10/31/2024, Until Tue 4/29/2025, Normal      warfarin (COUMADIN) 10 mg tablet Take 1.5 tablets (15 mg total) by mouth daily, Starting Tue 11/12/2024, Normal             ED SEPSIS DOCUMENTATION   Time reflects when diagnosis was documented in both MDM as applicable and the Disposition within this note       Time User Action Codes Description Comment    3/3/2025  8:26 PM Adi Braun Add [S92.414A] Closed nondisplaced fracture of proximal phalanx of right great toe, initial encounter                  Adi Braun MD  03/13/25 4644

## 2025-03-05 LAB
DME PARACHUTE DELIVERY DATE ACTUAL: NORMAL
DME PARACHUTE DELIVERY DATE REQUESTED: NORMAL
DME PARACHUTE ITEM DESCRIPTION: NORMAL
DME PARACHUTE ORDER STATUS: NORMAL
DME PARACHUTE SUPPLIER NAME: NORMAL
DME PARACHUTE SUPPLIER PHONE: NORMAL

## 2025-03-05 NOTE — ANESTHESIA PREPROCEDURE EVALUATION
Procedure:  left arm basilic fistula elevation (Left: Arm Upper)    Relevant Problems   ANESTHESIA (within normal limits)      CARDIO   (+) Coronary artery disease with angina pectoris ([facility])   (+) History of PTCA with stent   (+) MI (myocardial infarction) ([facility])   (+) Other hyperlipidemia   (+) Primary hypertension   (+) Pulmonary embolism (HCC)      /RENAL   (+) Dialysis patient ([facility])   (+) ESRD (end stage renal disease) on dialysis ([facility])      HEMATOLOGY   (+) Anemia of chronic disease      PULMONARY   (+) ANGY (obstructive sleep apnea)      Other   (+) Morbid obesity ([facility])      Dialysis yesterday     SABINE to LAD in 2020    Able to climb flight of stairs without cardiopulmonary limitation    Cormack I with Araya 4 on 7/19/24. When queried about difficult intubation hx, patient reports emergent intubation during remote procedure for which sedation had been planned, but does not recall being told intubation itself was difficult    Recent labs personally reviewed:  Lab Results   Component Value Date    WBC 8.52 01/09/2025    HGB 7.9 (L) 01/09/2025     01/09/2025     Lab Results   Component Value Date     12/29/2016    K 4.4 01/09/2025    BUN 50 (H) 01/09/2025    CREATININE 7.99 (H) 01/09/2025    GLUCOSE 135 05/26/2021     Lab Results   Component Value Date    PTT 36 (H) 11/21/2024      Lab Results   Component Value Date    INR 1.70 (H) 01/09/2025       Lab Results   Component Value Date    HGBA1C 5.8 (H) 10/24/2024       Type and Screen:  A    History    Chest pain, HTN, pulmonary embolism, ESRD, anemia, CAD, HLD, morbid obesity, ANGY, acute respiratory failure     Interpretation Summary         Left Ventricle: Left ventricular cavity size is normal. There is mild concentric hypertrophy. The left ventricular ejection fraction is %. Systolic function is normal. Wall motion is normal. Diastolic function is normal.    Right Ventricle: Right ventricular cavity size is normal. Systolic function is normal.     Tricuspid Valve: There is trace regurgitation.    Prior TTE study available for comparison. Prior study date: 6/4/2024. No significant changes noted compared to the prior study.      Physical Exam    Airway    Mallampati score: III  TM Distance: >3 FB  Neck ROM: full     Dental   No notable dental hx     Cardiovascular      Pulmonary      Other Findings        Anesthesia Plan  ASA Score- 4     Anesthesia Type- general with ASA Monitors.         Additional Monitors:     Airway Plan: ETT.           Plan Factors-Exercise tolerance (METS): <4 METS.    Chart reviewed. EKG reviewed. Imaging results reviewed. Existing labs reviewed. Patient summary reviewed.    Patient is not a current smoker.              Induction- intravenous.    Postoperative Plan- Plan for postoperative opioid use. Planned trial extubation        Informed Consent- Anesthetic plan and risks discussed with patient.  I personally reviewed this patient with the CRNA. Discussed and agreed on the Anesthesia Plan with the CRNA..      NPO Status:  No vitals data found for the desired time range.

## 2025-03-06 ENCOUNTER — ANESTHESIA (OUTPATIENT)
Dept: PERIOP | Facility: HOSPITAL | Age: 45
End: 2025-03-06
Payer: COMMERCIAL

## 2025-03-06 ENCOUNTER — HOSPITAL ENCOUNTER (OUTPATIENT)
Facility: HOSPITAL | Age: 45
Setting detail: OUTPATIENT SURGERY
Discharge: HOME | End: 2025-03-06
Attending: SURGERY | Admitting: SURGERY
Payer: COMMERCIAL

## 2025-03-06 VITALS
DIASTOLIC BLOOD PRESSURE: 57 MMHG | BODY MASS INDEX: 47.74 KG/M2 | TEMPERATURE: 97.2 F | WEIGHT: 315 LBS | HEART RATE: 78 BPM | SYSTOLIC BLOOD PRESSURE: 123 MMHG | HEIGHT: 68 IN | RESPIRATION RATE: 16 BRPM | OXYGEN SATURATION: 96 %

## 2025-03-06 DIAGNOSIS — Z99.2 ESRD (END STAGE RENAL DISEASE) ON DIALYSIS (HCC): Primary | ICD-10-CM

## 2025-03-06 DIAGNOSIS — N18.6 ESRD (END STAGE RENAL DISEASE) ON DIALYSIS (HCC): Primary | ICD-10-CM

## 2025-03-06 LAB
GLUCOSE SERPL-MCNC: 166 MG/DL (ref 65–140)
GLUCOSE SERPL-MCNC: 175 MG/DL (ref 65–140)

## 2025-03-06 PROCEDURE — 82948 REAGENT STRIP/BLOOD GLUCOSE: CPT

## 2025-03-06 PROCEDURE — 36832 AV FISTULA REVISION OPEN: CPT | Performed by: SURGERY

## 2025-03-06 RX ORDER — OXYCODONE HYDROCHLORIDE 5 MG/1
5 TABLET ORAL ONCE AS NEEDED
Refills: 0 | Status: COMPLETED | OUTPATIENT
Start: 2025-03-06 | End: 2025-03-06

## 2025-03-06 RX ORDER — HYDROMORPHONE HCL IN WATER/PF 6 MG/30 ML
0.2 PATIENT CONTROLLED ANALGESIA SYRINGE INTRAVENOUS
Status: DISCONTINUED | OUTPATIENT
Start: 2025-03-06 | End: 2025-03-06 | Stop reason: HOSPADM

## 2025-03-06 RX ORDER — FENTANYL CITRATE 50 UG/ML
INJECTION, SOLUTION INTRAMUSCULAR; INTRAVENOUS AS NEEDED
Status: DISCONTINUED | OUTPATIENT
Start: 2025-03-06 | End: 2025-03-06

## 2025-03-06 RX ORDER — PROPOFOL 10 MG/ML
INJECTION, EMULSION INTRAVENOUS AS NEEDED
Status: DISCONTINUED | OUTPATIENT
Start: 2025-03-06 | End: 2025-03-06

## 2025-03-06 RX ORDER — ROCURONIUM BROMIDE 10 MG/ML
INJECTION, SOLUTION INTRAVENOUS AS NEEDED
Status: DISCONTINUED | OUTPATIENT
Start: 2025-03-06 | End: 2025-03-06

## 2025-03-06 RX ORDER — LIDOCAINE HYDROCHLORIDE 10 MG/ML
INJECTION, SOLUTION EPIDURAL; INFILTRATION; INTRACAUDAL; PERINEURAL AS NEEDED
Status: DISCONTINUED | OUTPATIENT
Start: 2025-03-06 | End: 2025-03-06 | Stop reason: HOSPADM

## 2025-03-06 RX ORDER — BUPIVACAINE HYDROCHLORIDE 5 MG/ML
INJECTION, SOLUTION EPIDURAL; INTRACAUDAL AS NEEDED
Status: DISCONTINUED | OUTPATIENT
Start: 2025-03-06 | End: 2025-03-06 | Stop reason: HOSPADM

## 2025-03-06 RX ORDER — OXYCODONE AND ACETAMINOPHEN 5; 325 MG/1; MG/1
1 TABLET ORAL EVERY 4 HOURS PRN
Qty: 20 TABLET | Refills: 0 | Status: SHIPPED | OUTPATIENT
Start: 2025-03-06 | End: 2025-03-16

## 2025-03-06 RX ORDER — MAGNESIUM HYDROXIDE 1200 MG/15ML
LIQUID ORAL AS NEEDED
Status: DISCONTINUED | OUTPATIENT
Start: 2025-03-06 | End: 2025-03-06 | Stop reason: HOSPADM

## 2025-03-06 RX ORDER — GLYCOPYRROLATE 0.2 MG/ML
INJECTION INTRAMUSCULAR; INTRAVENOUS AS NEEDED
Status: DISCONTINUED | OUTPATIENT
Start: 2025-03-06 | End: 2025-03-06

## 2025-03-06 RX ORDER — FENTANYL CITRATE/PF 50 MCG/ML
50 SYRINGE (ML) INJECTION
Status: DISCONTINUED | OUTPATIENT
Start: 2025-03-06 | End: 2025-03-06 | Stop reason: HOSPADM

## 2025-03-06 RX ORDER — ACETAMINOPHEN 10 MG/ML
INJECTION, SOLUTION INTRAVENOUS AS NEEDED
Status: DISCONTINUED | OUTPATIENT
Start: 2025-03-06 | End: 2025-03-06

## 2025-03-06 RX ORDER — MIDAZOLAM HYDROCHLORIDE 2 MG/2ML
INJECTION, SOLUTION INTRAMUSCULAR; INTRAVENOUS AS NEEDED
Status: DISCONTINUED | OUTPATIENT
Start: 2025-03-06 | End: 2025-03-06

## 2025-03-06 RX ORDER — LIDOCAINE HYDROCHLORIDE 10 MG/ML
INJECTION, SOLUTION EPIDURAL; INFILTRATION; INTRACAUDAL; PERINEURAL AS NEEDED
Status: DISCONTINUED | OUTPATIENT
Start: 2025-03-06 | End: 2025-03-06

## 2025-03-06 RX ORDER — SODIUM CHLORIDE 9 MG/ML
INJECTION, SOLUTION INTRAVENOUS CONTINUOUS PRN
Status: DISCONTINUED | OUTPATIENT
Start: 2025-03-06 | End: 2025-03-06

## 2025-03-06 RX ORDER — ONDANSETRON 2 MG/ML
4 INJECTION INTRAMUSCULAR; INTRAVENOUS ONCE AS NEEDED
Status: DISCONTINUED | OUTPATIENT
Start: 2025-03-06 | End: 2025-03-06 | Stop reason: HOSPADM

## 2025-03-06 RX ORDER — CLINDAMYCIN PHOSPHATE 900 MG/50ML
900 INJECTION, SOLUTION INTRAVENOUS ONCE
Status: COMPLETED | OUTPATIENT
Start: 2025-03-06 | End: 2025-03-06

## 2025-03-06 RX ORDER — DEXAMETHASONE SODIUM PHOSPHATE 10 MG/ML
INJECTION, SOLUTION INTRAMUSCULAR; INTRAVENOUS AS NEEDED
Status: DISCONTINUED | OUTPATIENT
Start: 2025-03-06 | End: 2025-03-06

## 2025-03-06 RX ORDER — CHLORHEXIDINE GLUCONATE ORAL RINSE 1.2 MG/ML
15 SOLUTION DENTAL ONCE
Status: COMPLETED | OUTPATIENT
Start: 2025-03-06 | End: 2025-03-06

## 2025-03-06 RX ORDER — ONDANSETRON 2 MG/ML
INJECTION INTRAMUSCULAR; INTRAVENOUS AS NEEDED
Status: DISCONTINUED | OUTPATIENT
Start: 2025-03-06 | End: 2025-03-06

## 2025-03-06 RX ADMIN — CLINDAMYCIN PHOSPHATE 900 MG: 900 INJECTION, SOLUTION INTRAVENOUS at 08:11

## 2025-03-06 RX ADMIN — ONDANSETRON 4 MG: 2 INJECTION INTRAMUSCULAR; INTRAVENOUS at 07:50

## 2025-03-06 RX ADMIN — OXYCODONE HYDROCHLORIDE 5 MG: 5 TABLET ORAL at 11:06

## 2025-03-06 RX ADMIN — SODIUM CHLORIDE: 9 INJECTION, SOLUTION INTRAVENOUS at 07:53

## 2025-03-06 RX ADMIN — PROPOFOL 200 MG: 10 INJECTION, EMULSION INTRAVENOUS at 08:00

## 2025-03-06 RX ADMIN — GLYCOPYRROLATE 0.1 MG: 0.2 INJECTION, SOLUTION INTRAMUSCULAR; INTRAVENOUS at 07:50

## 2025-03-06 RX ADMIN — MIDAZOLAM 2 MG: 1 INJECTION INTRAMUSCULAR; INTRAVENOUS at 07:50

## 2025-03-06 RX ADMIN — LIDOCAINE HYDROCHLORIDE 50 MG: 10 INJECTION, SOLUTION EPIDURAL; INFILTRATION; INTRACAUDAL; PERINEURAL at 07:58

## 2025-03-06 RX ADMIN — DEXAMETHASONE SODIUM PHOSPHATE 10 MG: 10 INJECTION, SOLUTION INTRAMUSCULAR; INTRAVENOUS at 08:03

## 2025-03-06 RX ADMIN — ROCURONIUM BROMIDE 80 MG: 10 INJECTION, SOLUTION INTRAVENOUS at 08:01

## 2025-03-06 RX ADMIN — ACETAMINOPHEN 1000 MG: 10 INJECTION INTRAVENOUS at 08:22

## 2025-03-06 RX ADMIN — PROPOFOL 100 MG: 10 INJECTION, EMULSION INTRAVENOUS at 08:01

## 2025-03-06 RX ADMIN — FENTANYL CITRATE 100 MCG: 50 INJECTION INTRAMUSCULAR; INTRAVENOUS at 07:59

## 2025-03-06 RX ADMIN — CHLORHEXIDINE GLUCONATE 15 ML: 1.2 SOLUTION ORAL at 06:36

## 2025-03-06 RX ADMIN — FENTANYL CITRATE 50 MCG: 50 INJECTION INTRAMUSCULAR; INTRAVENOUS at 10:22

## 2025-03-06 RX ADMIN — SUGAMMADEX 400 MG: 100 INJECTION, SOLUTION INTRAVENOUS at 09:55

## 2025-03-06 RX ADMIN — FENTANYL CITRATE 50 MCG: 50 INJECTION INTRAMUSCULAR; INTRAVENOUS at 10:13

## 2025-03-06 NOTE — OP NOTE
OPERATIVE REPORT  PATIENT NAME: Joaquin Frankel    :  1980  MRN: [id number]  Pt Location: [facility]    SURGERY DATE: 3/6/2025    Surgeons and Role:     * Scout James, DO - Primary     * Julita Kemp MD    Preop Diagnosis:  ESRD (end stage renal disease) on dialysis (HCC) [N18.6, Z99.2]    Post-Op Diagnosis Codes:     * ESRD (end stage renal disease) on dialysis (HCC) [N18.6, Z99.2]    Procedure(s):  Left - left arm basilic fistula elevation    Estimated Blood Loss:   10 mL    Anesthesia Type:   General    Operative Indications:  43 yo M h/o ESRD s/p LUE brachiobasilic AVF creation on 24 presents for elevation.    Complications:   None    Procedure and Technique:  The patient was brought to the operative suite and placed in supine position.  Anesthesia was induced and titrated to effect and the patient was intubated.  Preoperative antibiotics were administered and SCDs were placed.  The left upper extremity brachiobasilic fistula was assessed via ultrasonography and was noted to be 3-5 cm deep in the upper arm.  The fistula trajectory was marked. He was then prepped and draped in usual sterile fashion and a timeout was performed per hospital policy correctly identifying the patient, procedure, and laterality.    An incision was made along the entire length of the previously marked fistula trajectory.  The incision was deepened through the subcutaneous tissue using Bovie electrocautery until the fistula itself was identified.  Once the fistula was identified, it was circumferentially freed along its length.  All branches were ligated using titanium clips and 3-0 silk ties.  The fistula itself did appear to be approximately 5 mm in diameter throughout its length and did have a good thrill.  A subcutaneous flap was then made in the upper arm laterally just beneath the dermis and the fistula was tucked into this pocket.  A single 6-0 Prolene tacking suture was used to tack the fistula into  the pocket laterally.  Floseal was placed in the base of the wound once hemostasis was achieved.  The subcutaneous flap was then closed in a running fashion using 3-0 Vicryl suture.  The incision was then closed in layers, first with a 3-0 Vicryl running deep dermal layer followed by a running 4-0 Monocryl in the skin.  Local anesthetic was injected surrounding the incision. The incision was then dressed with Steri-Strips and a sterile Mepilex.  He did have a palpable thrill in the fistula at the conclusion of the case.    The patient was then allowed to emerge from anesthesia and was extubated.  He tolerated the procedure well with no immediate postoperative complications.  All counts were correct as reported to me.  He was taken to PACU in stable condition.  Of note, Dr. James was present for the entirety of the procedure.    SIGNATURE: Julita Kemp MD  DATE: March 6, 2025  TIME: 10:06 AM

## 2025-03-06 NOTE — ANESTHESIA POSTPROCEDURE EVALUATION
Post-Op Assessment Note    CV Status:  Stable    Pain management: adequate       Mental Status:  Alert and awake   Hydration Status:  Euvolemic   PONV Controlled:  Controlled   Airway Patency:  Patent     Post Op Vitals Reviewed: Yes    No anethesia notable event occurred.    Staff: Anesthesiologist, CRNA           Last Filed PACU Vitals:  Vitals Value Taken Time   Temp 98 °F (36.7 °C) 03/06/25 1006   Pulse 74 03/06/25 1040   /65 03/06/25 1040   Resp 12 03/06/25 1040   SpO2 91 % 03/06/25 1040       Modified Zachary:     Vitals Value Taken Time   Activity 2 03/06/25 1015   Respiration 2 03/06/25 1015   Circulation 2 03/06/25 1015   Consciousness 2 03/06/25 1015   Oxygen Saturation 1 03/06/25 1015     Modified Zachary Score: 9

## 2025-03-06 NOTE — ANESTHESIA POSTPROCEDURE EVALUATION
Post-Op Assessment Note    CV Status:  Stable    Pain management: adequate       Mental Status:  Alert and awake   Hydration Status:  Euvolemic   PONV Controlled:  Controlled   Airway Patency:  Patent     Post Op Vitals Reviewed: Yes    No anethesia notable event occurred.    Staff: CRNA           Last Filed PACU Vitals:  Vitals Value Taken Time   Temp 98    Pulse 80 03/06/25 1008   /66 03/06/25 1007   Resp 13 03/06/25 1008   SpO2 96 %/6L 03/06/25 1008   Vitals shown include unfiled device data.

## 2025-03-06 NOTE — INTERVAL H&P NOTE
H&P reviewed. After examining the patient I find no changes in the patients condition since the H&P had been written.    Vitals:    03/06/25 0644   BP: 128/78   Pulse:    Resp:    Temp:    SpO2:      CV: RRR  Lung: CTAB

## 2025-03-06 NOTE — DISCHARGE INSTR - AVS FIRST PAGE
DISCHARGE INSTRUCTIONS  DIALYSIS FISTULA SURGERY    ACTIVITY:  Limit use of the operated arm to what is necessary for the first day after surgery. On the second day after surgery, you may start to increase use of your arm as tolerated.  Avoid heavy lifting (no more than 15 lbs) for the first one week.  You should start to exercise your hand on the side of the fistula by squeezing a stress ball or a rolled-up sock. This increases blood flow in your fistula and arm so your fistula will function better.    Feel for a thrill every day. The thrill is the vibration or pulse you feel over the fistula that means the blood is flowing through it. If you cannot feel a thrill, call our office ([phone]).    DIET:   Resume your normal diet.  Good nutrition is important for healing of your incision.    DRESSING:   You may have surgical glue at your surgical site.  There are stitches present under the skin which will absorb on their own.  The glue is used to cover the incision, assist in closure, and prevent contamination. This adhesive will darken and peel away on its own within one to two weeks. Do not pick at it.  If you have a dressing over your surgical site, remove this on the second day after surgery.      INCISION:   If you do not have a dialysis catheter in place, you may shower and get your incision wet.  Wash incision daily with soap and water, but do not rub or scrub the incision; rinse thoroughly and pat dry.  You may have stitches or staples to close your incision and it is okay for these to get wet.  Do not bathe in a tub or swim for the first 4 week following surgery or if you have any open wounds.  It is normal to have mild swelling or discoloration around the incision.   If increasing redness or pain develops, call our office immediately.  Numbness in the region of the incision may occur following the surgery.  This normally improves over six to twelve months.  If you have numbness or pain in your hand,  please call our office immediately.  DO NOT put any powders, creams, ointments, or lotions on your incision.     ARM SWELLING:    Most patients have some noticeable arm swelling after surgery.  This usually disappears within a few weeks.  If swelling is present, elevate the arm whenever possible.      RESTRICTIONS:   Do NOT have blood draws, IV's, or blood pressures performed on the operated arm.    FISTULA USE:    Your fistula will not be used until it has fully matured - approximately 6 to 12 weeks. If you are using a catheter for dialysis, this will not be removed until after your fistula has matured and is being used for dialysis without any issues.    FOLLOW UP STUDIES:  A Doppler ultrasound will be performed about 5-6 weeks after surgery.  Your surgeon will arrange this at your first postoperative visit.     FOLLOW UP APPOINTMENTS:  Making and keeping follow up appointments and ultrasound tests are important to your recovery.  If you have difficulty making it to or keeping your follow up appointments, call the office.    If you have increased pain, fever >101.5, increased drainage, redness or a bad smell at your surgery site, new coldness/numbness of your arm or leg, please call us immediately and GO directly to the ER.    PLEASE CALL THE OFFICE IF YOU HAVE ANY QUESTIONS  [phone]  [phone] [address], [address], [place] [address]  [address] [place] [address]  [address] [place] [address]  [address] [facility], [place] [address]  [address] [facility], [place] [address]  [address] [facility], [place] [address]  [address] [facility] 2nd Floor, [place] [address]  [address] [facility], 1st Floor, [address], [place] [address]  [address] [place] [address]  [address] [facility], [address], [place] [address]

## 2025-03-06 NOTE — INTERVAL H&P NOTE
H&P reviewed. After examining the patient I find no changes in the patients condition since the H&P had been written.    Vitals:    03/06/25 0644   BP: 128/78   Pulse:    Resp:    Temp:    SpO2:

## 2025-03-07 ENCOUNTER — NURSE TRIAGE (OUTPATIENT)
Age: 45
End: 2025-03-07

## 2025-03-07 ENCOUNTER — PATIENT MESSAGE (OUTPATIENT)
Dept: VASCULAR SURGERY | Facility: CLINIC | Age: 45
End: 2025-03-07

## 2025-03-07 NOTE — TELEPHONE ENCOUNTER
Called and spoke with patient, informed him of recommendations per Reina. He stated that he is already doing the Tylenol along with the Oxycodone and nothing has been improving his pain. Informed him that he should then be evaluated in ER per Reina. Patient verbalized understanding.

## 2025-03-07 NOTE — TELEPHONE ENCOUNTER
He can take tylenol 650 mg every 4-6 hours along with oxycodone 5 mg. He should not exceed more than 4000 mg of acetaminophen (tylenol) in 24 hours. If his pain is not controlled with pain medication, he should be evaluated in the ER.

## 2025-03-07 NOTE — TELEPHONE ENCOUNTER
Is anyone able to evaluate his AVF at dialysis or take a photo for patient to upload? Is he having any incision concerns (erythema, drainage, dehiscence, enlarging lumps?). Recommend arm elevation, ice, prn pain medication, and gentle compression with ACE bandage if able to tolerate.

## 2025-03-07 NOTE — TELEPHONE ENCOUNTER
"FOLLOW UP: Pt is concerned about his increased pain. He is currently at dialysis.     REASON FOR CONVERSATION: Post-op Problem    SYMPTOMS: Left arm side of bicep pain, hard, swollen. Burning, stabbing pain. Elevating arm and icing.     OTHER: Oxycodone 5mg every 4 hours PRN     DISPOSITION: Discuss with Provider and Call Back Patient        Answer Assessment - Initial Assessment Questions  1. SYMPTOM: \"What's the main symptom you're concerned about?\" (e.g., pain, fever, vomiting)      pain  2. ONSET: \"When did pain  start?\"      Last night   3. SURGERY: \"What surgery did you have?\"      left arm basilic fistula elevation (Left: Arm Upper)  4. DATE of SURGERY: \"When was the surgery?\"       3/6/2025  5. ANESTHESIA: \"What type of anesthesia did you have?\" (e.g., general, spinal, epidural, local)      General  6. DRAINS: \"Were any drains place in or around the wound?\" (e.g., Hemovac, Spencer-Lara, Penrose)      Denies   7. PAIN: \"Is there any pain?\" If Yes, ask: \"How bad is it?\"  (Scale 1-10; or mild, moderate, severe)      9 pain level   8. FEVER: \"Do you have a fever?\" If Yes, ask: \"What is your temperature, how was it measured, and when did it start?\"      Denies   9. VOMITING: \"Is there any vomiting?\" If Yes, ask: \"How many times?\"      Feel nausea   10. BLEEDING: \"Is there any bleeding?\" If Yes, ask: \"How much?\" and \"Where?\"        Denies   11. OTHER SYMPTOMS: \"Do you have any other symptoms?\" (e.g., drainage from wound, painful urination, constipation)        Denies any drainage    Protocols used: Post-Op Symptoms and Questions-Adult-[place]    "

## 2025-03-07 NOTE — TELEPHONE ENCOUNTER
Called and s/w patient, he will have someone evaluate AVF and upload a picture. Pt denies erythremia, drainage, dehiscence, or lumps. Relayed recommendations. Pt verbalized understanding. Pt would like recommendations for pain since the oxycodone 5 mg every 4 hours PRN is not helping with pain.

## 2025-03-10 ENCOUNTER — TELEPHONE (OUTPATIENT)
Dept: VASCULAR SURGERY | Facility: CLINIC | Age: 45
End: 2025-03-10

## 2025-03-10 NOTE — TELEPHONE ENCOUNTER
Vascular Nurse Navigator Post Op Phone Call    Post-Discharge phone call attempted to assess patient recovery after vascular surgery I left a message on answering machine. Will attempt to contact at later date.        Pt's chart was reviewed prior to call and leaving message.    Procedure: Left - left arm basilic fistula elevation     Date of Procedure: 3/6/25    Surgeon:   * Scout James, DO - Primary     * Julita Kemp MD        Anticoagulation pt was discharged on post op?: Warfarin (Coumadin or Jantoven)    Statin pt was discharged on post op?:  Lipitor (atorvastatin)      Dialysis Days and Location: MWF at [facility]    Reminded pt of the following in message:    NEXT SCHEDULED OFFICE VISIT:  3/28/25 at 10 am with Dr. James at [facility]    To contact [facility] with any concerns.

## 2025-03-10 NOTE — TELEPHONE ENCOUNTER
Vascular Nurse Navigator Post Op Call    Procedure: Left - left arm basilic fistula elevation      Date of Procedure: 3/6/25     Surgeon:   * Scout James, DO - Primary     * Julita Kemp MD      Painful tingling or numbness in your fingers?: No    Paleness/Coolness in hands/fingers?: No    Redness, swelling or pus from your wound?: see below    Bleeding?: No    Thrill present?: Yes    Anticoagulation pt was discharged on post op?: Warfarin (Coumadin or Jantoven)    Statin pt was discharged on post op?:  Lipitor (atorvastatin)    Fever/chills?: No    Uncontrolled Pain?: No      Reviewed discharge instructions and incision care with patient.      Dialysis Days and Location: M-[id number] at [facility]     NEXT SCHEDULED OFFICE VISIT: 3/28/25 at 10 am with Dr. James at [facility]     Transportation Confirmed?: Yes      Any Questions or Concerns?    Patient stated that he is doing good since discharge.  He stated that his pain is better, but he still has some pain.  He stated that his arm is still swollen and his incision is draining serosanguinous drainage.  He denies any open areas on his incision and he stated the steri strips have fallen off.  He stated that he has an ABD pad on it and is changing the dressing twice a day, but the dressing is moist when he changes it.  He stated that the dialysis staff looked at it today and did not have any concerns.  Advised him to elevate his arm and to notify office with any signs of infection or increase in drainage.  Reviewed incision care with him - wash daily with soap and water.  Reviewed discharge medication - Coumadin.  All questions answered.

## 2025-03-12 ENCOUNTER — NURSE TRIAGE (OUTPATIENT)
Age: 45
End: 2025-03-12

## 2025-03-12 NOTE — TELEPHONE ENCOUNTER
"FOLLOW UP: Received call from Brian at [facility] stating the pt's left arm fistula has no bruit or thrill today.  They were able to feel the thrill and hear bruit at his last treatment on Monday.  Pt has 1 hour left of his HD treatment.      REASON FOR CONVERSATION: no bruit or thrill    SYMPTOMS: denies any temperature or color changes, or motor or sensation losses.    OTHER: Please call pt back with follow up instructions     DISPOSITION: 24-48 Hour Provider Response    Reason for Disposition   Affirmative: Patient has non-urgent question that triager is not able to answer    Answer Assessement - Initial Assessment Questions  1. MAIN CONCERN OR SYMPTOM:   \"What is your main concern right now?\" \"What questions do you have?\" \"What's the main symptom you're worried about?\"   No bruit or thrill   2. ONSET: When did the symptom start?   Today   3. OTHER SYMPTOMS: \"Do you have any other symptoms?\" (e.g., weakness)   Denies   4. SURGERY: When was your surgery/date?   3/6/25  5. THRILL: Are you able to feel a thrill present at your fistula site?   No   6. SWELLING: Are you experiencing any swelling?   Denies   7. COLOR: Are you having any changes to the color of your hand or fingers in the arm with the fistula? Is your hand colder or warmer? Is your hand more pale or blue colored?   Denies   8. NUMBNESS: Are you experiencing any numbness or tingling in your hand or fingers?   Denies   9. FUNCTION: Are you able to use your hand to perform everyday functions or grasp items?   Yes   10. INCISION SITE: Is your incision draining? If yes, what does the drainage look like (bloody, pink, white, clear, etc....)?   Denies   11. TEMPERATURE: What is your temperature? Are you experiencing any chills?   Unsure    Protocols used: Cardiovascular-Arteriovenous (AV) Fistula-ADULT-[place]    "

## 2025-03-12 NOTE — TELEPHONE ENCOUNTER
Reviewed case with Dr. James. No testing to be done at this time. Patient is to keep his scheduled OV 3/28 for Dr. James to evaluate.     Clinical: Please contact patient with plan as above. Thank you. -RD

## 2025-03-12 NOTE — TELEPHONE ENCOUNTER
Called and spoke with patient, informed him of plan/recommendations per Alecia and Dr. James. Patient verbalized understanding and will also pass along the message to Brian at [facility].

## 2025-03-22 ENCOUNTER — HOSPITAL ENCOUNTER (EMERGENCY)
Facility: HOSPITAL | Age: 45
Discharge: HOME | End: 2025-03-22
Attending: EMERGENCY MEDICINE
Payer: COMMERCIAL

## 2025-03-22 VITALS
SYSTOLIC BLOOD PRESSURE: 122 MMHG | TEMPERATURE: 99.4 F | WEIGHT: 168.9 LBS | DIASTOLIC BLOOD PRESSURE: 76 MMHG | BODY MASS INDEX: 25.68 KG/M2 | RESPIRATION RATE: 18 BRPM | OXYGEN SATURATION: 94 % | HEART RATE: 87 BPM

## 2025-03-22 DIAGNOSIS — N18.6 BENIGN HYPERTENSION WITH ESRD (END-STAGE RENAL DISEASE) (HCC): ICD-10-CM

## 2025-03-22 DIAGNOSIS — T81.30XA WOUND DEHISCENCE: Primary | ICD-10-CM

## 2025-03-22 DIAGNOSIS — I12.0 BENIGN HYPERTENSION WITH ESRD (END-STAGE RENAL DISEASE) (HCC): ICD-10-CM

## 2025-03-22 DIAGNOSIS — Z51.89 VISIT FOR WOUND CHECK: ICD-10-CM

## 2025-03-22 PROCEDURE — 87186 SC STD MICRODIL/AGAR DIL: CPT | Performed by: EMERGENCY MEDICINE

## 2025-03-22 PROCEDURE — 87205 SMEAR GRAM STAIN: CPT | Performed by: EMERGENCY MEDICINE

## 2025-03-22 PROCEDURE — 87070 CULTURE OTHR SPECIMN AEROBIC: CPT | Performed by: EMERGENCY MEDICINE

## 2025-03-22 PROCEDURE — 87147 CULTURE TYPE IMMUNOLOGIC: CPT | Performed by: EMERGENCY MEDICINE

## 2025-03-22 PROCEDURE — 99284 EMERGENCY DEPT VISIT MOD MDM: CPT

## 2025-03-22 PROCEDURE — 99284 EMERGENCY DEPT VISIT MOD MDM: CPT | Performed by: EMERGENCY MEDICINE

## 2025-03-22 RX ORDER — CLINDAMYCIN HYDROCHLORIDE 150 MG/1
300 CAPSULE ORAL ONCE
Status: COMPLETED | OUTPATIENT
Start: 2025-03-22 | End: 2025-03-22

## 2025-03-22 RX ORDER — CLINDAMYCIN HYDROCHLORIDE 150 MG/1
150 CAPSULE ORAL EVERY 8 HOURS SCHEDULED
Qty: 21 CAPSULE | Refills: 0 | Status: SHIPPED | OUTPATIENT
Start: 2025-03-22 | End: 2025-03-24 | Stop reason: CLARIF

## 2025-03-22 RX ADMIN — CLINDAMYCIN HYDROCHLORIDE 300 MG: 150 CAPSULE ORAL at 13:10

## 2025-03-22 NOTE — ED PROVIDER NOTES
Time reflects when diagnosis was documented in both MDM as applicable and the Disposition within this note       Time User Action Codes Description Comment    3/22/2025  1:08 PM Zach Marlow Add [Z51.89] Visit for wound check     3/22/2025  1:09 PM Zach Marlow Add [T81.30XA] Wound dehiscence     3/22/2025  1:09 PM Zach Marlow Modify [Z51.89] Visit for wound check     3/22/2025  1:09 PM Zach Marlow Modify [T81.30XA] Wound dehiscence           ED Disposition       ED Disposition   Discharge    Condition   Stable    Date/Time   Sat Mar 22, 2025  1:08 PM    Comment   Joaquin Frankel discharge to home/self care.                   Assessment & Plan       Medical Decision Making  Patient had drainage from postoperative wound.  Sent from dialysis for evaluation.    Wound shows superficial dehiscence without overt infection.  Culture taken.  Wound cleansed with sterile saline and redressed.  Doppler signals over the AV graft are present.  Patient started on antibiotics empirically    Amount and/or Complexity of Data Reviewed  Labs: ordered.    Risk  Prescription drug management.             Medications   clindamycin (CLEOCIN) capsule 300 mg (300 mg Oral Given 3/22/25 1310)       ED Risk Strat Scores                            SBIRT 20yo+      Flowsheet Row Most Recent Value   Initial Alcohol Screen: US AUDIT-C     3a. Male UNDER 65: How often do you have five or more drinks on one occasion? 0 Filed at: 03/22/2025 1236   Audit-C Score 0 Filed at: 03/22/2025 1236   ALEX: How many times in the past year have you...    Used an illegal drug or used a prescription medication for non-medical reasons? Never Filed at: 03/22/2025 1236                            History of Present Illness       Chief Complaint   Patient presents with    Dialysis Shunt Problem     Patient had surgery on his left upper arm fistula on 3/6. States wound is draining again since last night. Dressing changed yesterday.        Past Medical  "History:   Diagnosis Date    Acute hypoxic respiratory failure ([facility]) 10/07/2023    Arthritis     Breathing difficulty     Cellulitis 10/07/2023    Chronic kidney disease     end stage renal disease    Coronary artery disease     Diabetes mellitus ([facility])     Diabetic neuropathy ([facility]) 05/28/2021    Heart disease     CAD   s/p ptca with 1 stent 2012    Hidradenitis suppurativa     groin    History of transfusion     Hyperlipidemia     Hypertension     MI (myocardial infarction) ([facility])     \" silent \" M.I. in the past    Morbid obesity with BMI of 50.0-59.9, adult ([facility])     Nicotine dependence     Obesity     PE (pulmonary thromboembolism) ([facility]) 06/2024    small on left side, takes coumadin    Pilonidal cyst     Type 1 non-ST elevation myocardial infarction (NSTEMI) ([facility]) 11/29/2020      Past Surgical History:   Procedure Laterality Date    CARDIAC SURGERY      CORONARY ANGIOPLASTY WITH STENT PLACEMENT      x2    INCISION AND DRAINAGE OF WOUND N/A 01/24/2017    Procedure: INCISION AND DRAINAGE (I&D) BUTTOCK, PILONIDAL CYST;  Surgeon: Simba Adhikari MD;  Location: [facility];  Service:     IR BIOPSY BONE MARROW  06/12/2024    IR BIOPSY KIDNEY RANDOM  07/12/2024    IR BIOPSY KIDNEY RANDOM  07/19/2024    IR TEMPORARY DIALYSIS CATHETER PLACEMENT  06/06/2024    IR TUNNELED CENTRAL LINE PLACEMENT  06/14/2024    IR TUNNELED DIALYSIS CATHETER CHECK/CHANGE/REPOSITION/ANGIOPLASTY  1/2/2025    IR TUNNELED DIALYSIS CATHETER CHECK/CHANGE/REPOSITION/ANGIOPLASTY  2/5/2025    LEG SURGERY Left     I&D of left leg 2012. Had resp failure and had to be intubated during procedure.    CT ARTERIOVENOUS ANASTOMOSIS OPEN DIRECT Left 09/26/2024    Procedure: left arm radio-cephalic AVF creation;  Surgeon: Scout James DO;  Location: [facility];  Service: Vascular    CT ARTERIOVENOUS ANASTOMOSIS OPEN DIRECT Left 12/23/2024    Procedure: left arm brachio-basilic AVF creation;  Surgeon: Scout James DO;  Location: [facility];  Service: Vascular    CT " DEBRIDEMENT SUBCUTANEOUS TISSUE 1ST 20 SQ CM/< Left 07/06/2021    Procedure: DEBRIDEMENT WOUND (WASH OUT);  Surgeon: Sudheer Mcfarlane MD;  [facility];  Service: General    KY EXC B9 LESION MRGN XCP SK TG T/A/L >4.0 CM Left 04/06/2021    Procedure: EXCISION THIGH MASS;  Surgeon: Sudheer Mcfarlane MD;  [facility];  Service: General    KY EXCISION H/P/P/U COMPLEX REPAIR Left 07/06/2021    Procedure: EXCISION PERINEAL ABSCESS LEFT THIGH;  Surgeon: Sudheer Mcfarlane MD;  [facility];  Service: General    KY NEGATIVE PRESSURE WOUND THERAPY DME <= 50 SQ CM Left 04/06/2021    Procedure: APPLICATION VAC DRESSING THIGH;  Surgeon: Sudheer Mcfarlane MD;  [facility];  Service: General    KY REVJ OPN ARVEN FSTL W/O THRMBC DIAL GRF Left 3/6/2025    Procedure: left arm basilic fistula elevation;  Surgeon: Scout James DO;  [facility];  Service: Vascular    WOUND DEBRIDEMENT Left 04/17/2021    Procedure: DEBRIDEMENT WOUND AND DRESSING CHANGE (WASH OUT);  Surgeon: Mahin Traore DO;  [facility];  Service: General    WOUND DEBRIDEMENT Left 04/22/2021    Procedure: DEBRIDEMENT LOWER EXTREMITY (WASH OUT), left groin and thigh;  Surgeon: Sudeher Mcfarlane MD;  [facility];  Service: General    WOUND DEBRIDEMENT Left 05/26/2021    Procedure: DEBRIDEMENT LOWER EXTREMITY (WASH OUT);  Surgeon: Zak Castanon DO;  [facility];  Service: General    WOUND DEBRIDEMENT Left 05/28/2021    Procedure: Washout left thigh wound and closure;  Surgeon: Amor Jaramillo DO;  [facility];  Service: General      Family History   Problem Relation Age of Onset    Heart disease Father     Diabetes Father     Hypertension Mother     Heart disease Mother       Social History     Tobacco Use    Smoking status: Former     Current packs/day: 0.00     Average packs/day: 1.5 packs/day for 20.0 years (29.9 ttl pk-yrs)     Types: Cigarettes     Start date: 01/2021     Quit date: 03/2021     Years since  quittin.0     Passive exposure: Past    Smokeless tobacco: Never   Vaping Use    Vaping status: Never Used   Substance Use Topics    Alcohol use: Not Currently    Drug use: Never      E-Cigarette/Vaping    E-Cigarette Use Never User       E-Cigarette/Vaping Substances    Nicotine No     THC No     CBD No     Flavoring No     Other No     Unknown No       I have reviewed and agree with the history as documented.     Patient has a history of ESRD and is on dialysis.  Currently is being done through a Shiley catheter on the right side.  Patient undergone formation of AV fistula in the basilic system on the left side.  Initial procedure was performed in December of last year.  Patient had a revision and elevation 2 weeks ago.  It has not been used yet for dialysis.  Patient was sent in from the dialysis center today for evaluation of the wound which has started to drain.  Patient has noted a small area of dehiscence in the past few days.  Denies fever or chills.        Review of Systems   Constitutional:  Negative for chills and fever.   HENT:  Negative for congestion.    Eyes:  Negative for visual disturbance.   Respiratory:  Negative for cough.    Cardiovascular:  Negative for chest pain.   Gastrointestinal:  Negative for abdominal pain and vomiting.   Genitourinary:  Negative for dysuria.   Musculoskeletal:  Negative for back pain.   Skin:  Positive for wound.   Neurological:  Negative for headaches.   Hematological:  Does not bruise/bleed easily.   Psychiatric/Behavioral:  Negative for confusion.    All other systems reviewed and are negative.          Objective       ED Triage Vitals [25 1234]   Temperature Pulse Blood Pressure Respirations SpO2 Patient Position - Orthostatic VS   99.4 °F (37.4 °C) 87 122/76 18 94 % Sitting      Temp Source Heart Rate Source BP Location FiO2 (%) Pain Score    Tympanic Monitor Right arm -- 3      Vitals      Date and Time Temp Pulse SpO2 Resp BP Pain Score FACES Pain  Rating User   03/22/25 1234 99.4 °F (37.4 °C) 87 94 % 18 122/76 3 -- SW            Physical Exam  Vitals and nursing note reviewed.   Constitutional:       Appearance: He is obese.   HENT:      Head: Normocephalic.      Right Ear: External ear normal.      Left Ear: External ear normal.      Nose: Nose normal.      Mouth/Throat:      Pharynx: Oropharynx is clear.   Eyes:      Conjunctiva/sclera: Conjunctivae normal.   Cardiovascular:      Rate and Rhythm: Normal rate.      Pulses: Normal pulses.   Pulmonary:      Effort: Pulmonary effort is normal.   Abdominal:      Palpations: Abdomen is soft.   Musculoskeletal:         General: Normal range of motion.      Cervical back: Normal range of motion.   Skin:     General: Skin is warm and dry.      Capillary Refill: Capillary refill takes less than 2 seconds.      Comments: Fresh wound in the left upper extremity with 1 cm area of dehiscence in the proximal part of the incision.  There is fibrinous exudate present.  No cellulitis noted.   Neurological:      General: No focal deficit present.      Mental Status: He is alert.   Psychiatric:         Mood and Affect: Mood normal.         Results Reviewed       Procedure Component Value Units Date/Time    Wound culture and Gram stain [[id number]] Collected: 03/22/25 1310    Lab Status: In process Specimen: Wound from Arm, Left Updated: 03/22/25 1315            No orders to display       Procedures    ED Medication and Procedure Management   Prior to Admission Medications   Prescriptions Last Dose Informant Patient Reported? Taking?   Cholecalciferol (VITAMIN D3) 1,000 units tablet  Self No No   Sig: Take 2 tablets (2,000 Units total) by mouth daily   Diclofenac Sodium (VOLTAREN) 1 %   No No   Sig: Apply 2 g topically 4 (four) times a day   NIFEdipine (PROCARDIA XL) 30 mg 24 hr tablet  Self No No   Sig: Take 1 tablet (30 mg total) by mouth daily at bedtime   aspirin (ECOTRIN LOW STRENGTH) 81 mg EC tablet  Self No No   Sig: Take  1 tablet (81 mg total) by mouth daily   atorvastatin (LIPITOR) 80 mg tablet  Self No No   Sig: Take 1 tablet (80 mg total) by mouth daily   b complex-vitamin C-folic acid (RENAL) 1 mg  Self No No   Sig: Take 1 capsule by mouth daily with dinner   calcium acetate (PHOSLO) capsule  Self No No   Sig: Take 2 capsules (1,334 mg total) by mouth 3 (three) times a day   carvedilol (COREG) 6.25 mg tablet  Self No No   Sig: Take 1 tablet (6.25 mg total) by mouth 2 (two) times a day with meals   isosorbide mononitrate (IMDUR) 60 mg 24 hr tablet  Self No No   Sig: Take 1 tablet (60 mg total) by mouth daily   metolazone (ZAROXOLYN) 10 mg tablet  Self No No   Sig: Take 1 tablet (10 mg total) by mouth 4 (four) times a week   sevelamer (RENAGEL) 800 mg tablet  Self No No   Sig: Take 1 tablet (800 mg total) by mouth 3 (three) times a day with meals   torsemide (DEMADEX) 100 mg tablet  Self No No   Sig: Take 1 tablet (100 mg total) by mouth 4 (four) times a week   warfarin (COUMADIN) 10 mg tablet  Self No No   Sig: Take 1.5 tablets (15 mg total) by mouth daily      Facility-Administered Medications: None     Discharge Medication List as of 3/22/2025  1:10 PM        START taking these medications    Details   clindamycin (CLEOCIN) 150 mg capsule Take 1 capsule (150 mg total) by mouth every 8 (eight) hours for 7 days, Starting Sat 3/22/2025, Until Sat 3/29/2025, Normal           CONTINUE these medications which have NOT CHANGED    Details   aspirin (ECOTRIN LOW STRENGTH) 81 mg EC tablet Take 1 tablet (81 mg total) by mouth daily, Starting Thu 8/29/2024, Normal      atorvastatin (LIPITOR) 80 mg tablet Take 1 tablet (80 mg total) by mouth daily, Starting Thu 8/29/2024, Normal      b complex-vitamin C-folic acid (RENAL) 1 mg Take 1 capsule by mouth daily with dinner, Starting Wed 8/21/2024, Until Sun 5/18/2025, Normal      calcium acetate (PHOSLO) capsule Take 2 capsules (1,334 mg total) by mouth 3 (three) times a day, Starting Thu  10/31/2024, Normal      carvedilol (COREG) 6.25 mg tablet Take 1 tablet (6.25 mg total) by mouth 2 (two) times a day with meals, Starting Thu 10/31/2024, Until Tue 4/29/2025, Normal      Cholecalciferol (VITAMIN D3) 1,000 units tablet Take 2 tablets (2,000 Units total) by mouth daily, Starting Wed 8/14/2024, Normal      Diclofenac Sodium (VOLTAREN) 1 % Apply 2 g topically 4 (four) times a day, Starting Mon 3/3/2025, Normal      isosorbide mononitrate (IMDUR) 60 mg 24 hr tablet Take 1 tablet (60 mg total) by mouth daily, Starting Thu 8/29/2024, Normal      metolazone (ZAROXOLYN) 10 mg tablet Take 1 tablet (10 mg total) by mouth 4 (four) times a week, Starting Thu 10/31/2024, Normal      NIFEdipine (PROCARDIA XL) 30 mg 24 hr tablet Take 1 tablet (30 mg total) by mouth daily at bedtime, Starting Thu 10/31/2024, Until Mon 7/28/2025, Normal      sevelamer (RENAGEL) 800 mg tablet Take 1 tablet (800 mg total) by mouth 3 (three) times a day with meals, Starting Thu 10/31/2024, Normal      torsemide (DEMADEX) 100 mg tablet Take 1 tablet (100 mg total) by mouth 4 (four) times a week, Starting Thu 10/31/2024, Until Tue 4/29/2025, Normal      warfarin (COUMADIN) 10 mg tablet Take 1.5 tablets (15 mg total) by mouth daily, Starting Tue 11/12/2024, Normal           No discharge procedures on file.  ED SEPSIS DOCUMENTATION   Time reflects when diagnosis was documented in both MDM as applicable and the Disposition within this note       Time User Action Codes Description Comment    3/22/2025  1:08 PM Zach Marlow [Z51.89] Visit for wound check     3/22/2025  1:09 PM Zach Marlow Add [T81.30XA] Wound dehiscence     3/22/2025  1:09 PM Zach Marlow Modify [Z51.89] Visit for wound check     3/22/2025  1:09 PM Zach Marlow Modify [T81.30XA] Wound dehiscence                  Zach Marlow MD  03/22/25 1313

## 2025-03-23 ENCOUNTER — RESULTS FOLLOW-UP (OUTPATIENT)
Dept: EMERGENCY DEPT | Facility: HOSPITAL | Age: 45
End: 2025-03-23

## 2025-03-24 ENCOUNTER — VBI (OUTPATIENT)
Dept: FAMILY MEDICINE CLINIC | Facility: CLINIC | Age: 45
End: 2025-03-24

## 2025-03-24 DIAGNOSIS — T81.30XA WOUND DEHISCENCE: Primary | ICD-10-CM

## 2025-03-24 LAB
BACTERIA WND AEROBE CULT: ABNORMAL
GRAM STN SPEC: ABNORMAL
GRAM STN SPEC: ABNORMAL

## 2025-03-24 RX ORDER — TORSEMIDE 100 MG/1
TABLET ORAL
Qty: 90 TABLET | Refills: 1 | Status: SHIPPED | OUTPATIENT
Start: 2025-03-24

## 2025-03-24 RX ORDER — SULFAMETHOXAZOLE AND TRIMETHOPRIM 800; 160 MG/1; MG/1
1 TABLET ORAL EVERY 12 HOURS SCHEDULED
Qty: 14 TABLET | Refills: 0 | Status: SHIPPED | OUTPATIENT
Start: 2025-03-24 | End: 2025-03-31

## 2025-03-24 RX ORDER — DOXYCYCLINE 100 MG/1
100 CAPSULE ORAL EVERY 12 HOURS SCHEDULED
Qty: 20 CAPSULE | Refills: 0 | Status: SHIPPED | OUTPATIENT
Start: 2025-03-24 | End: 2025-03-24 | Stop reason: CLARIF

## 2025-03-24 NOTE — TELEPHONE ENCOUNTER
03/24/25 8:37 AM    Patient contacted post ED visit, [facility] department spoke with patient/caregiver and outreach was successful.    Thank you.  YARELIS BAUTISTA MA  [facility]

## 2025-03-24 NOTE — PROGRESS NOTES
Patient was prescribed clindamycin on March 22 for wound dehiscence.  Culture growing Staph aureus resistant to clindamycin.  Will switch to doxycycline 100 mg BID x 10 days.

## 2025-03-26 NOTE — PROGRESS NOTES
Name: Joaquin Frankel      : 1980      MRN: [id number]  Encounter Provider: Scout James DO  Encounter Date: 3/28/2025   Enc[facility]  :  Assessment & Plan  ESRD (end stage renal disease) on dialysis ([facility])  Lab Results   Component Value Date    EGFR 7 2025    EGFR 5 2024    EGFR 5 2024    CREATININE 7.99 (H) 2025    CREATININE 9.84 (H) 2024    CREATININE 10.69 (H) 2024     44-year-old male well-known to me for dialysis access he has had multiple procedures including a brachiocephalic fistula that failed to mature and he had a upper arm brachiobasilic fistula and a subsequent elevation of this that was done 3 weeks ago.  At the time of his elevation his fistula was not fully mature it was approximately 5 mm he is obese and has a large upper arm today he has an area of skin dehiscence in the upper part of his incision he was seen in the emergency department and is taking antibiotics for this ultimately I think this will heal with good wound care in the next few weeks and he does not require any additional antibiotics or debridement of this at this time.  I cannot palpate the thrill in his fistula however the fistula is patent by Doppler this is from a combination of the vein being likely not fully mature as well as some edema in his arm I am going to continue local wound care and see him back in the office in 3 weeks at that time if the wound is almost healed will proceed with a fistulogram and balloon assisted maturation to attempt to salvage this fistula.               History of Present Illness     Patient is s/p L arm basilic fistula elevation on 3/6/25 and presents today for post-op visit. Reports surgical dehiscence x 7 days.     HPI  Joaquin Frankel is a 44 y.o. male   History obtained from: patient    Review of Systems   Constitutional: Negative.    HENT: Negative.     Eyes: Negative.    Respiratory: Negative.    "  Cardiovascular: Negative.    Gastrointestinal: Negative.    Endocrine: Negative.    Genitourinary: Negative.    Musculoskeletal: Negative.    Skin: Negative.    Allergic/Immunologic: Negative.    Neurological: Negative.    Hematological: Negative.    Psychiatric/Behavioral: Negative.       I have reviewed the ROS above and made changes as needed.         Objective   /88 (BP Location: Right arm, Patient Position: Sitting)   Pulse 92   Ht 5' 8\" (1.727 m)   Wt (!) 168 kg (370 lb 6 oz)   BMI 56.31 kg/m²      Physical Exam      Upper Extremity:  Left upper arm incision with skin dehiscence partially  No signs of infection  Absent thrill, basilic fistula patent by doppler  Normal LUE  strength        Administrative Statements   I have spent a total time of 20 minutes in caring for this patient on the day of the visit/encounter including Counseling / Coordination of care, Documenting in the medical record, and Reviewing/placing orders in the medical record (including tests, medications, and/or procedures).  "

## 2025-03-27 DIAGNOSIS — E83.39 HYPERPHOSPHATEMIA: ICD-10-CM

## 2025-03-27 RX ORDER — CALCIUM ACETATE 667 MG/1
1334 CAPSULE ORAL 3 TIMES DAILY
Qty: 540 CAPSULE | Refills: 3 | Status: SHIPPED | OUTPATIENT
Start: 2025-03-27

## 2025-03-28 ENCOUNTER — OFFICE VISIT (OUTPATIENT)
Dept: VASCULAR SURGERY | Facility: CLINIC | Age: 45
End: 2025-03-28

## 2025-03-28 VITALS
BODY MASS INDEX: 47.74 KG/M2 | HEART RATE: 92 BPM | HEIGHT: 68 IN | DIASTOLIC BLOOD PRESSURE: 88 MMHG | SYSTOLIC BLOOD PRESSURE: 142 MMHG | WEIGHT: 315 LBS

## 2025-03-28 DIAGNOSIS — N18.6 ESRD (END STAGE RENAL DISEASE) ON DIALYSIS (HCC): Primary | ICD-10-CM

## 2025-03-28 DIAGNOSIS — Z99.2 ESRD (END STAGE RENAL DISEASE) ON DIALYSIS (HCC): Primary | ICD-10-CM

## 2025-03-28 PROCEDURE — 99024 POSTOP FOLLOW-UP VISIT: CPT | Performed by: SURGERY

## 2025-03-28 NOTE — ASSESSMENT & PLAN NOTE
Lab Results   Component Value Date    EGFR 7 01/09/2025    EGFR 5 11/21/2024    EGFR 5 11/16/2024    CREATININE 7.99 (H) 01/09/2025    CREATININE 9.84 (H) 11/21/2024    CREATININE 10.69 (H) 11/16/2024     44-year-old male well-known to me for dialysis access he has had multiple procedures including a brachiocephalic fistula that failed to mature and he had a upper arm brachiobasilic fistula and a subsequent elevation of this that was done 3 weeks ago.  At the time of his elevation his fistula was not fully mature it was approximately 5 mm he is obese and has a large upper arm today he has an area of skin dehiscence in the upper part of his incision he was seen in the emergency department and is taking antibiotics for this ultimately I think this will heal with good wound care in the next few weeks and he does not require any additional antibiotics or debridement of this at this time.  I cannot palpate the thrill in his fistula however the fistula is patent by Doppler this is from a combination of the vein being likely not fully mature as well as some edema in his arm I am going to continue local wound care and see him back in the office in 3 weeks at that time if the wound is almost healed will proceed with a fistulogram and balloon assisted maturation to attempt to salvage this fistula.

## 2025-04-07 NOTE — ASSESSMENT & PLAN NOTE
Anesthesia Post-op Note    Patient: Surinder Dan  Procedure(s) Performed: TRANSESOPHAGEAL ECHO (JACK) W/ W/O IMAGING AGENT  Anesthesia type: MAC    Vitals Value Taken Time   Temp 36.7 °C (98.1 °F) 04/07/25 1410   Pulse 65 04/07/25 1410   Resp 16 04/07/25 1410   SpO2 96 % 04/07/25 1410   /76 04/07/25 1410         Patient Location: Phase II  Post-op Vital Signs:stable  Level of Consciousness: participates in exam, answers questions appropriately, awake, alert and oriented  Respiratory Status: spontaneous ventilation  Cardiovascular blood pressure returned to baseline and stable  Hydration: euvolemic  Pain Management: adequately controlled  Vomiting: none  Nausea: None  Airway Patency:patent  Post-op Assessment: awake, alert, appropriately conversant, or baseline, no complications and patient tolerated procedure well      No notable events documented.                       Creatine on admission 6.4  Baseline creatine 1.2-1.6  Most recent creatine 9.70  Unclear etiology   6/6 temp HD cath placed  6/7 first iHD   6/8 iHD  6/9 placed on Bumex drip  6/9 started CVVH - remove 50 ml/hour  Kidney biopsy per nephrology   Monitor strict I&O

## 2025-04-16 NOTE — UTILIZATION REVIEW
"Initial Clinical Review    Admission: Date/Time/Statement:   Admission Orders (From admission, onward)       Ordered        01/25/24 1958  Place in Observation  Once                          Orders Placed This Encounter   Procedures    Place in Observation     Standing Status:   Standing     Number of Occurrences:   1     Order Specific Question:   Level of Care     Answer:   Med Surg [16]     ED Arrival Information       Expected   -    Arrival   1/25/2024 18:36    Acuity   Emergent              Means of arrival   Walk-In    Escorted by   Self    Service   Hospitalist    Admission type   Emergency              Arrival complaint   leg swelling / pain             Chief Complaint   Patient presents with    Fever     States he dropped a couch on his left leg about 4 days ago and \" 2 days later I became sick as a dog \". Patient is diabetic and ran out meds a few weeks ago. He is diaphoretic. Fever of 101.4    Wound Infection - Complicated       Initial Presentation: 43 y.o. male PMH morbid obesity, lower extremity lymphedema, hidradenitis suppurativa, uncontrolled DM type 2, CAD presents increasing left lower extremity pain erythema associated with malaise, N/V and diaphoresis. Stated dropped couch on left ankle 3 days ago causing abrasion and pain now 10 out of 10. Pt with b/l lumphedema left worse than right. Abrasion diffuse erythema up to thigh. Increased warmth and tenderness. In ED febrile T 101.4, associated tachycardia. Leukocytosis and hyponatremia. Bld cx obtained pt started on IV Vanco.  Observation Sepsis f/u bld cx started on Ancef,IVNS, urine cx ,f/u cbc procal.   Cellulitis left lower extremity ID consulted ,IVAbx cx.   DM SSI algorithm4  Date:  1/26/24  Day 2:   Cellulitis left lower ext   Starting emperic Ancef 2gm q8hr IV  ID consulted  F/u blood cx.   Tylenol ,dilaudid pain mgt  CLOVIS monitor bmp qd ,continue IVF hydration  Hyponatremia IVF , Na level expected improve with ivf therapy check urine " See OT evaluation for all goals and OT POC. Electronically signed by Elif Shanks OTR/L on 4/16/2025 at 3:15 PM     na and urine osmolality  Hold lisinopril due to CLOVIS   ED Triage Vitals [01/25/24 1851]   Temperature Pulse Respirations Blood Pressure SpO2   (!) 101.4 °F (38.6 °C) 97 20 122/55 93 %      Temp Source Heart Rate Source Patient Position - Orthostatic VS BP Location FiO2 (%)   Tympanic Monitor Sitting Left arm --      Pain Score       10 - Worst Possible Pain          Wt Readings from Last 1 Encounters:   01/26/24 (!) 170 kg (374 lb 12.5 oz)     Additional Vital Signs:   1/26/24 07:41:15 -- 88 -- 117/60 79 94 % -- --   01/26/24 03:17:58 98.2 °F (36.8 °C) 78 18 114/59 77 94 % -- --   01/25/24 22:52:09 98.4 °F (36.9 °C) 86 -- 116/56 76 94 % -- --   01/25/24 21:09:51 98.4 °F (36.9 °C) 85 18 116/71 86 94 % -- --   01/25/24 2033 98.4 °F (36.9 °C) -- -- --         Pertinent Labs/Diagnostic Test Results:   VAS VENOUS DUPLEX -LOWER LIMB UNILATERAL    (Results Pending)   XR ankle 2 vw left    (Results Pending)     Results from last 7 days   Lab Units 01/25/24 1934   SARS-COV-2  Negative     Results from last 7 days   Lab Units 01/26/24 0520 01/25/24 1934   WBC Thousand/uL 9.52 14.82*   HEMOGLOBIN g/dL 11.4* 12.5   HEMATOCRIT % 36.2* 37.5   PLATELETS Thousands/uL 267 274   NEUTROS ABS Thousands/µL 7.95* 13.13*     Results from last 7 days   Lab Units 01/26/24 0520 01/25/24 1934   SODIUM mmol/L 130* 127*   POTASSIUM mmol/L 3.4* 4.8   CHLORIDE mmol/L 96 93*   CO2 mmol/L 27 25   ANION GAP mmol/L 7 9   BUN mg/dL 33* 28*   CREATININE mg/dL 1.66* 1.59*   EGFR ml/min/1.73sq m 50 52   CALCIUM mg/dL 7.7* 7.9*     Results from last 7 days   Lab Units 01/26/24 0520 01/25/24 1934   AST U/L 17 28   ALT U/L 11 12   ALK PHOS U/L 74 73   TOTAL PROTEIN g/dL 7.2 7.7   ALBUMIN g/dL 2.7* 3.0*   TOTAL BILIRUBIN mg/dL 0.40 0.50     Results from last 7 days   Lab Units 01/26/24  0710 01/25/24 2103 01/25/24 1933   POC GLUCOSE mg/dl 184* 168* 178*     Results from last 7 days   Lab Units 01/26/24  0520 01/25/24 1934   GLUCOSE RANDOM mg/dL 157*  "179*     Results from last 7 days   Lab Units 01/26/24  0520   PROCALCITONIN ng/ml 4.81*     Results from last 7 days   Lab Units 01/25/24 2006   LACTIC ACID mmol/L 1.0     Results from last 7 days   Lab Units 01/26/24  0520   CRP mg/L 477.0*     Results from last 7 days   Lab Units 01/26/24  0841   SODIUM UR  22     Results from last 7 days   Lab Units 01/25/24 1934   INFLUENZA A PCR  Negative   INFLUENZA B PCR  Negative   RSV PCR  Negative     Results from last 7 days   Lab Units 01/25/24 1934   BLOOD CULTURE  Received in Microbiology Lab. Culture in Progress.  Received in Microbiology Lab. Culture in Progress.         ED Treatment:   Medication Administration from 01/25/2024 1836 to 01/25/2024 2058         Date/Time Order Dose Route Action Action by Comments     01/25/2024 1952 EST sodium chloride 0.9 % bolus 500 mL 500 mL Intravenous New Bag Helen Sauer RN --     01/25/2024 1949 EST ketorolac (TORADOL) injection 15 mg 15 mg Intravenous Given Helen Sauer RN --     01/25/2024 1944 EST vancomycin (VANCOCIN) 2,000 mg in sodium chloride 0.9 % 500 mL IVPB 2,000 mg Intravenous New Bag Helen Sauer RN --     01/25/2024 1948 EST acetaminophen (TYLENOL) tablet 975 mg 975 mg Oral Given Helen Sauer RN --     01/25/2024 1958 EST ondansetron (ZOFRAN) injection 4 mg 4 mg Intravenous Given Helen Sauer RN --          Past Medical History:   Diagnosis Date    Acute hypoxic respiratory failure (HCC) 10/7/2023    Arthritis     Breathing difficulty     Coronary artery disease     Diabetes mellitus ([facility])     Diabetic neuropathy ([facility]) 5/28/2021    Heart disease     CAD   s/p ptca with 1 stent 2012    Hidradenitis suppurativa     groin    History of transfusion     Hyperlipidemia     Hypertension     MI (myocardial infarction) ([facility])     \" silent \" M.I. in the past    Morbid obesity with BMI of 50.0-59.9, adult ([facility])     Nicotine dependence     Obesity     Pilonidal cyst     Type 1 non-ST " elevation myocardial infarction (NSTEMI) ([facility]) 11/29/2020     Present on Admission:   Cellulitis of left lower extremity   Sepsis ([facility])   Essential hypertension   Type 2 diabetes mellitus with diabetic polyneuropathy, without long-term current use of insulin ([facility])   Lymphedema of both lower extremities   Coronary artery disease   Dyslipidemia   Hyponatremia   CLOVIS (acute kidney injury) ([facility])   History of hidradenitis suppurativa      Admitting Diagnosis: Wound infection [T14.8XXA, L08.9]  Fever [R50.9]  Cellulitis of left lower extremity [L03.116]  Age/Sex: 43 y.o. male  Admission Orders:  Urine micro  Cmp cbc diff   Hemoglobin A1c  Urine cx osmolality    Scheduled Medications:  acetaminophen, 975 mg, Oral, Q8H SHAZIA  [START ON 1/27/2024] amLODIPine, 10 mg, Oral, Daily  aspirin, 81 mg, Oral, Daily  atorvastatin, 80 mg, Oral, Daily  carvedilol, 6.25 mg, Oral, BID With Meals  cefazolin, 2,000 mg, Intravenous, Q8H  heparin (porcine), 7,500 Units, Subcutaneous, Q8H SHAZIA  insulin lispro, 2-12 Units, Subcutaneous, 4x Daily (AC & HS)  zinc sulfate, 220 mg, Oral, Daily      Continuous IV Infusions:  sodium chloride, 125 mL/hr, Intravenous, Continuous      PRN Meds:  calcium carbonate, 1,000 mg, Oral, Daily PRN  HYDROmorphone, 0.5 mg, Intravenous, Q6H PRN  HYDROmorphone, 0.2 mg, Intravenous, Once PRN  ondansetron, 4 mg, Intravenous, Q6H PRN        None    Network Utilization Review Department  ATTENTION: Please call with any questions or concerns to [phone] and carefully listen to the prompts so that you are directed to the right person. All voicemails are confidential.   For Discharge needs, contact Care Management DC Support Team at [phone] opt. 2  Send all requests for admission clinical reviews, approved or denied determinations and any other requests to dedicated fax number below belonging to the campus where the patient is receiving treatment. List of dedicated fax numbers for the Facilities:  FACILITY NAME UR  FAX NUMBER   ADMISSION DENIALS (Administrative/Medical Necessity) [phone]   DISCHARGE SUPPORT TEAM (NETWORK) [phone]   PARENT CHILD HEALTH (Maternity/NICU/Pediatrics) [phone]   [facility] [phone]   [facility] [phone]   [facility] [phone]   [facility] [phone]   [facility] [phone]   [facility] [phone]   [facility] [phone]   [facility] [phone]   [facility] [phone]   [facility] [phone]   [facility] [phone]   [facility] [phone]

## 2025-04-16 NOTE — PROGRESS NOTES
Name: Joaquin Frankel      : 1980      MRN: [id number]  Encounter Provider: Scout James DO  Encounter Date: 2025   Enc[facility] department: [facility] [place]  :  Assessment & Plan  ESRD (end stage renal disease) on dialysis ([facility])  Lab Results   Component Value Date    EGFR 7 2025    EGFR 5 2024    EGFR 5 2024    CREATININE 7.99 (H) 2025    CREATININE 9.84 (H) 2024    CREATININE 10.69 (H) 2024       44-year-old male well-known to me presents for follow-up for dialysis access.  He initially had a proximal left radiocephalic fistula created which failed to mature and had a two-stage left upper arm brachiobasilic fistula.  The second stage was done about 6 weeks ago he had some superficial wound dehiscence which has now resolved unfortunately his basilic fistula was just under 6 mm at the time of his elevation and postoperatively it has been difficult to palpate a thrill.  By Doppler his fistula does appear patent although it is clearly not accessible at this time.  Like I said his wound has healed at this point going to send him for a duplex to be done on his nondialysis day next Tuesday if this shows that the fistula is patent and close to adequate diameter for maturation we will send him for a fistulogram.  If he does not and it is clearly not going to be salvageable then he will need to be scheduled for a left arm AV graft placement.  I will schedule a virtual visit and discussed with this with him in 1 week.    Orders:    VAS DIALYSIS ACCESS EVALUATION- LEFT AV(Upper); Future        History of Present Illness     Patient presents today for 3 week f/u visit to reassesses LUE fistula thrill and wound healing.     HPI  Joaquin Frankel is a 44 y.o. male   History obtained from: patient    Review of Systems   Constitutional: Negative.    HENT: Negative.     Eyes: Negative.    Respiratory:  Positive for shortness of breath.    Cardiovascular:  Positive for  "leg swelling.   Gastrointestinal: Negative.    Endocrine: Negative.    Genitourinary: Negative.    Musculoskeletal: Negative.    Skin: Negative.    Allergic/Immunologic: Negative.    Neurological: Negative.    Hematological: Negative.    Psychiatric/Behavioral: Negative.       I have reviewed the ROS above and made changes as needed.         Objective   /78 (BP Location: Right arm, Patient Position: Sitting)   Pulse (!) 108   Ht 5' 8\" (1.727 m)   Wt (!) 169 kg (372 lb 9.2 oz)   BMI 56.65 kg/m²      Physical Exam  General  Exam: alert, awake, oriented, no distress, consistent with stated age    Upper Extremity:  Palpation: Radial pulse- Bilateral 2+  Left upper arm incision healed  No thrill palpable  Basilic fistula patent by doppler  Left hand warm, normal LUE  strength      Neurologic  Exam:alert, non-focal, oriented x 3        Administrative Statements   I have spent a total time of 15 minutes in caring for this patient on the day of the visit/encounter including Documenting in the medical record, Reviewing/placing orders in the medical record (including tests, medications, and/or procedures), and Obtaining or reviewing history  .  "

## 2025-04-17 ENCOUNTER — OFFICE VISIT (OUTPATIENT)
Dept: VASCULAR SURGERY | Facility: CLINIC | Age: 45
End: 2025-04-17

## 2025-04-17 VITALS
WEIGHT: 315 LBS | HEIGHT: 68 IN | HEART RATE: 108 BPM | SYSTOLIC BLOOD PRESSURE: 118 MMHG | DIASTOLIC BLOOD PRESSURE: 78 MMHG | BODY MASS INDEX: 47.74 KG/M2

## 2025-04-17 DIAGNOSIS — Z99.2 ESRD (END STAGE RENAL DISEASE) ON DIALYSIS (HCC): Primary | ICD-10-CM

## 2025-04-17 DIAGNOSIS — N18.6 ESRD (END STAGE RENAL DISEASE) ON DIALYSIS (HCC): Primary | ICD-10-CM

## 2025-04-17 PROCEDURE — 99024 POSTOP FOLLOW-UP VISIT: CPT | Performed by: SURGERY

## 2025-04-17 NOTE — ASSESSMENT & PLAN NOTE
Lab Results   Component Value Date    EGFR 7 01/09/2025    EGFR 5 11/21/2024    EGFR 5 11/16/2024    CREATININE 7.99 (H) 01/09/2025    CREATININE 9.84 (H) 11/21/2024    CREATININE 10.69 (H) 11/16/2024       44-year-old male well-known to me presents for follow-up for dialysis access.  He initially had a proximal left radiocephalic fistula created which failed to mature and had a two-stage left upper arm brachiobasilic fistula.  The second stage was done about 6 weeks ago he had some superficial wound dehiscence which has now resolved unfortunately his basilic fistula was just under 6 mm at the time of his elevation and postoperatively it has been difficult to palpate a thrill.  By Doppler his fistula does appear patent although it is clearly not accessible at this time.  Like I said his wound has healed at this point going to send him for a duplex to be done on his nondialysis day next Tuesday if this shows that the fistula is patent and close to adequate diameter for maturation we will send him for a fistulogram.  If he does not and it is clearly not going to be salvageable then he will need to be scheduled for a left arm AV graft placement.  I will schedule a virtual visit and discussed with this with him in 1 week.    Orders:    VAS DIALYSIS ACCESS EVALUATION- LEFT AV(Upper); Future

## 2025-04-22 ENCOUNTER — HOSPITAL ENCOUNTER (OUTPATIENT)
Dept: RADIOLOGY | Facility: HOSPITAL | Age: 45
Discharge: HOME | End: 2025-04-22
Attending: SURGERY
Payer: MEDICARE

## 2025-04-22 DIAGNOSIS — N18.6 ESRD (END STAGE RENAL DISEASE) ON DIALYSIS (HCC): ICD-10-CM

## 2025-04-22 DIAGNOSIS — Z99.2 ESRD (END STAGE RENAL DISEASE) ON DIALYSIS (HCC): ICD-10-CM

## 2025-04-22 PROCEDURE — 93990 DOPPLER FLOW TESTING: CPT

## 2025-04-22 PROCEDURE — 93990 DOPPLER FLOW TESTING: CPT | Performed by: SURGERY

## 2025-04-23 ENCOUNTER — TELEMEDICINE (OUTPATIENT)
Dept: VASCULAR SURGERY | Facility: CLINIC | Age: 45
End: 2025-04-23

## 2025-04-23 ENCOUNTER — TELEPHONE (OUTPATIENT)
Dept: VASCULAR SURGERY | Facility: CLINIC | Age: 45
End: 2025-04-23

## 2025-04-23 VITALS — HEIGHT: 68 IN | BODY MASS INDEX: 56.65 KG/M2

## 2025-04-23 DIAGNOSIS — N18.6 ESRD (END STAGE RENAL DISEASE) ON DIALYSIS (HCC): ICD-10-CM

## 2025-04-23 DIAGNOSIS — N18.6 ESRD (END STAGE RENAL DISEASE) (HCC): Primary | ICD-10-CM

## 2025-04-23 DIAGNOSIS — Z99.2 ESRD (END STAGE RENAL DISEASE) ON DIALYSIS (HCC): ICD-10-CM

## 2025-04-23 PROCEDURE — 99024 POSTOP FOLLOW-UP VISIT: CPT | Performed by: SURGERY

## 2025-04-23 RX ORDER — CEFAZOLIN SODIUM 3 G/50ML
3000 SOLUTION INTRAVENOUS ONCE
OUTPATIENT
Start: 2025-04-23 | End: 2025-04-23

## 2025-04-23 RX ORDER — CHLORHEXIDINE GLUCONATE ORAL RINSE 1.2 MG/ML
15 SOLUTION DENTAL ONCE
OUTPATIENT
Start: 2025-04-23 | End: 2025-04-23

## 2025-04-23 NOTE — PROGRESS NOTES
Virtual Brief Visit  Name: Joaquin Frankel      : 1980      MRN: [id number]  Encounter Provider: Scout James DO  Encounter Date: 2025   Enc[facility] department[facility] [place]  :  Assessment & Plan  ESRD (end stage renal disease) ([facility])  Lab Results   Component Value Date    EGFR 7 2025    EGFR 5 2024    EGFR 5 2024    CREATININE 7.99 (H) 2025    CREATININE 9.84 (H) 2024    CREATININE 10.69 (H) 2024       Orders:    Case request operating room: CREATION FISTULA ARTERIOVENOUS (AV) GRAFT; Standing    ESRD (end stage renal disease) on dialysis ([facility])  Lab Results   Component Value Date    EGFR 7 2025    EGFR 5 2024    EGFR 5 2024    CREATININE 7.99 (H) 2025    CREATININE 9.84 (H) 2024    CREATININE 10.69 (H) 2024            ESRD (end stage renal disease) ([facility])  Lab Results   Component Value Date    EGFR 7 2025    EGFR 5 2024    EGFR 5 2024    CREATININE 7.99 (H) 2025    CREATININE 9.84 (H) 2024    CREATININE 10.69 (H) 2024     Other orders    Diet NPO; Sips with meds; Standing    Void on call to OR; Standing    Insert peripheral IV; Standing    Nursing Communication CHG bath, have staff wash entire body (neck down) per pre op bathing protocol. Routine, evening prior to, and day of surgery.; Standing    Nursing Communication Swab both nares with Povidone-Iodine solution, EXCLUDE if patient has shellfish/Iodine allergy, and replace with nasal alcohol swabstick. Routine, day of surgery, on call to OR.; Standing    chlorhexidine (PERIDEX) 0.12 % oral rinse 15 mL    Place sequential compression device; Standing    Basic metabolic panel; Standing    ceFAZolin (ANCEF) IVPB (premix in dextrose) 3,000 mg 50 mL    ESRD (end stage renal disease) on dialysis ([facility])  Lab Results   Component Value Date    EGFR 7 2025    EGFR 5 2024    EGFR 5 2024    CREATININE 7.99 (H) 2025     CREATININE 9.84 (H) 11/21/2024    CREATININE 10.69 (H) 11/16/2024       Occluded left BB AVF  Recommend proceeding with left arm AVG  Discussed by phone and he is agreeable to proceed  Will consent day of surgery  Hold coumadin 5 days preop  Other orders    Diet NPO; Sips with meds; Standing    Void on call to OR; Standing    Insert peripheral IV; Standing    Nursing Communication CHG bath, have staff wash entire body (neck down) per pre op bathing protocol. Routine, evening prior to, and day of surgery.; Standing    Nursing Communication Swab both nares with Povidone-Iodine solution, EXCLUDE if patient has shellfish/Iodine allergy, and replace with nasal alcohol swabstick. Routine, day of surgery, on call to OR.; Standing    chlorhexidine (PERIDEX) 0.12 % oral rinse 15 mL    Place sequential compression device; Standing    Basic metabolic panel; Standing    ceFAZolin (ANCEF) IVPB (premix in dextrose) 3,000 mg 50 mL          History of Present Illness   HPI    Administrative Statements   Encounter provider Scout James, DO    The Patient is located at Home and in the following state in which I hold an active license [place].    The patient was identified by name and date of birth. Joaquin Frankel was informed that this is a telemedicine visit and that the visit is being conducted through the [employer] Teams platform. He agrees to proceed..  My office door was closed. No one else was in the room.  He acknowledged consent and understanding of privacy and security of the video platform. The patient has agreed to participate and understands they can discontinue the visit at any time.    I have spent a total time of 15 minutes in caring for this patient on the day of the visit/encounter including Impressions, Documenting in the medical record, and Reviewing/placing orders in the medical record (including tests, medications, and/or procedures), not including the time spent for establishing the audio/video  connection.        Operative Scheduling Information:    Hospital:  [place]    Physician:  Erika    Surgery: Left arm AVG    Urgency:  Standard    Level:  Level 4: Outpatients to be scheduled for screening procedures and elective surgery that can be delayed for longer than one month without reasonable expectation of detriment to patient.    Case Length:  Normal    Post-op Bed:  Outpatient    OR Table:  Standard    Equipment Needs:  None    Medication Instructions:  Coumadin:  Continue hold 5 days    Hydration:  No    Contrast Allergy:  no

## 2025-04-23 NOTE — ASSESSMENT & PLAN NOTE
Lab Results   Component Value Date    EGFR 7 01/09/2025    EGFR 5 11/21/2024    EGFR 5 11/16/2024    CREATININE 7.99 (H) 01/09/2025    CREATININE 9.84 (H) 11/21/2024    CREATININE 10.69 (H) 11/16/2024

## 2025-04-23 NOTE — ASSESSMENT & PLAN NOTE
Lab Results   Component Value Date    EGFR 7 01/09/2025    EGFR 5 11/21/2024    EGFR 5 11/16/2024    CREATININE 7.99 (H) 01/09/2025    CREATININE 9.84 (H) 11/21/2024    CREATININE 10.69 (H) 11/16/2024       Occluded left BB AVF  Recommend proceeding with left arm AVG  Discussed by phone and he is agreeable to proceed  Will consent day of surgery  Hold coumadin 5 days preop

## 2025-04-23 NOTE — TELEPHONE ENCOUNTER
REMINDER: Under Reason For Call, comments MUST be formatted as:   (Surgeon's Initials) / (Procedure)      Special Instructions/FYI/Dialysis Days:   No clearances.  Level 4  Clearances: None    Consent: Consent will be signed day of procedure.    For Surgical Clearances     Levels   1-3   ROUTE this encounter to [facility] Surgery Coordinator Pool     Level   4   ROUTE this encounter to [facility] Surgery Coordinator Pool       HYDRATION CLEARANCES   ONLY ROUTE TO  [facility] Surgery Coordinator Pool       Yes, I have ROUTED this encounter to [facility] Surgery Coordinator.

## 2025-04-24 DIAGNOSIS — I12.0 BENIGN HYPERTENSION WITH ESRD (END-STAGE RENAL DISEASE) (HCC): ICD-10-CM

## 2025-04-24 DIAGNOSIS — N18.6 BENIGN HYPERTENSION WITH ESRD (END-STAGE RENAL DISEASE) (HCC): ICD-10-CM

## 2025-04-24 RX ORDER — NIFEDIPINE 30 MG/1
TABLET, EXTENDED RELEASE ORAL
Qty: 90 TABLET | Refills: 1 | Status: ON HOLD | OUTPATIENT
Start: 2025-04-24

## 2025-04-28 ENCOUNTER — APPOINTMENT (EMERGENCY)
Dept: RADIOLOGY | Facility: HOSPITAL | Age: 45
DRG: 602 | End: 2025-04-28
Payer: COMMERCIAL

## 2025-04-28 ENCOUNTER — HOSPITAL ENCOUNTER (INPATIENT)
Facility: HOSPITAL | Age: 45
LOS: 6 days | Discharge: HOME | DRG: 602 | End: 2025-05-06
Admitting: INTERNAL MEDICINE
Payer: COMMERCIAL

## 2025-04-28 DIAGNOSIS — K59.00 CONSTIPATION: ICD-10-CM

## 2025-04-28 DIAGNOSIS — Z99.2 DIALYSIS PATIENT (HCC): ICD-10-CM

## 2025-04-28 DIAGNOSIS — L03.314 CELLULITIS OF GROIN, RIGHT: Primary | ICD-10-CM

## 2025-04-28 DIAGNOSIS — L03.314 CELLULITIS OF GROIN: ICD-10-CM

## 2025-04-28 DIAGNOSIS — N18.6 ESRD (END STAGE RENAL DISEASE) (HCC): ICD-10-CM

## 2025-04-28 DIAGNOSIS — Z99.2 ESRD (END STAGE RENAL DISEASE) ON DIALYSIS (HCC): ICD-10-CM

## 2025-04-28 DIAGNOSIS — N18.6 BENIGN HYPERTENSION WITH ESRD (END-STAGE RENAL DISEASE) (HCC): ICD-10-CM

## 2025-04-28 DIAGNOSIS — L02.91 ABSCESS: ICD-10-CM

## 2025-04-28 DIAGNOSIS — N18.6 ESRD (END STAGE RENAL DISEASE) ON DIALYSIS (HCC): ICD-10-CM

## 2025-04-28 DIAGNOSIS — L02.214 ABSCESS OF RIGHT GROIN: ICD-10-CM

## 2025-04-28 DIAGNOSIS — I12.0 BENIGN HYPERTENSION WITH ESRD (END-STAGE RENAL DISEASE) (HCC): ICD-10-CM

## 2025-04-28 LAB
ALBUMIN SERPL BCG-MCNC: 3.6 G/DL (ref 3.5–5)
ALP SERPL-CCNC: 84 U/L (ref 34–104)
ALT SERPL W P-5'-P-CCNC: 15 U/L (ref 7–52)
ANION GAP SERPL CALCULATED.3IONS-SCNC: 19 MMOL/L (ref 4–13)
ANISOCYTOSIS BLD QL SMEAR: PRESENT
APTT PPP: 32 SECONDS (ref 23–34)
AST SERPL W P-5'-P-CCNC: 12 U/L (ref 13–39)
BASOPHILS # BLD MANUAL: 0.11 THOUSAND/UL (ref 0–0.1)
BASOPHILS NFR MAR MANUAL: 1 % (ref 0–1)
BILIRUB SERPL-MCNC: 0.24 MG/DL (ref 0.2–1)
BUN SERPL-MCNC: 50 MG/DL (ref 5–25)
CALCIUM SERPL-MCNC: 9.1 MG/DL (ref 8.4–10.2)
CHLORIDE SERPL-SCNC: 92 MMOL/L (ref 96–108)
CO2 SERPL-SCNC: 23 MMOL/L (ref 21–32)
CREAT SERPL-MCNC: 7.91 MG/DL (ref 0.6–1.3)
EOSINOPHIL # BLD MANUAL: 0.43 THOUSAND/UL (ref 0–0.4)
EOSINOPHIL NFR BLD MANUAL: 4 % (ref 0–6)
ERYTHROCYTE [DISTWIDTH] IN BLOOD BY AUTOMATED COUNT: 12.9 % (ref 11.6–15.1)
GFR SERPL CREATININE-BSD FRML MDRD: 7 ML/MIN/1.73SQ M
GLUCOSE SERPL-MCNC: 169 MG/DL (ref 65–140)
HCT VFR BLD AUTO: 28 % (ref 36.5–49.3)
HGB BLD-MCNC: 9.3 G/DL (ref 12–17)
INR PPP: 1.08 (ref 0.85–1.19)
LACTATE SERPL-SCNC: 0.7 MMOL/L (ref 0.5–2)
LYMPHOCYTES # BLD AUTO: 1.49 THOUSAND/UL (ref 0.6–4.47)
LYMPHOCYTES # BLD AUTO: 13 % (ref 14–44)
MCH RBC QN AUTO: 31.7 PG (ref 26.8–34.3)
MCHC RBC AUTO-ENTMCNC: 33.2 G/DL (ref 31.4–37.4)
MCV RBC AUTO: 96 FL (ref 82–98)
MONOCYTES # BLD AUTO: 0.32 THOUSAND/UL (ref 0–1.22)
MONOCYTES NFR BLD: 3 % (ref 4–12)
NEUTROPHILS # BLD MANUAL: 8.31 THOUSAND/UL (ref 1.85–7.62)
NEUTS SEG NFR BLD AUTO: 78 % (ref 43–75)
PLASMA CELLS NFR BLD: 1 % (ref 0–0)
PLATELET # BLD AUTO: 336 THOUSANDS/UL (ref 149–390)
PLATELET BLD QL SMEAR: ADEQUATE
PMV BLD AUTO: 9 FL (ref 8.9–12.7)
POTASSIUM SERPL-SCNC: 4 MMOL/L (ref 3.5–5.3)
PROT SERPL-MCNC: 8 G/DL (ref 6.4–8.4)
PROTHROMBIN TIME: 14.5 SECONDS (ref 12.3–15)
RBC # BLD AUTO: 2.93 MILLION/UL (ref 3.88–5.62)
RBC MORPH BLD: PRESENT
SODIUM SERPL-SCNC: 134 MMOL/L (ref 135–147)
WBC # BLD AUTO: 10.66 THOUSAND/UL (ref 4.31–10.16)

## 2025-04-28 PROCEDURE — 85610 PROTHROMBIN TIME: CPT

## 2025-04-28 PROCEDURE — 96365 THER/PROPH/DIAG IV INF INIT: CPT

## 2025-04-28 PROCEDURE — 99223 1ST HOSP IP/OBS HIGH 75: CPT

## 2025-04-28 PROCEDURE — 85730 THROMBOPLASTIN TIME PARTIAL: CPT

## 2025-04-28 PROCEDURE — 83605 ASSAY OF LACTIC ACID: CPT

## 2025-04-28 PROCEDURE — 99285 EMERGENCY DEPT VISIT HI MDM: CPT

## 2025-04-28 PROCEDURE — 80053 COMPREHEN METABOLIC PANEL: CPT

## 2025-04-28 PROCEDURE — 36415 COLL VENOUS BLD VENIPUNCTURE: CPT

## 2025-04-28 PROCEDURE — 84145 PROCALCITONIN (PCT): CPT

## 2025-04-28 PROCEDURE — 85007 BL SMEAR W/DIFF WBC COUNT: CPT

## 2025-04-28 PROCEDURE — 96375 TX/PRO/DX INJ NEW DRUG ADDON: CPT

## 2025-04-28 PROCEDURE — 74177 CT ABD & PELVIS W/CONTRAST: CPT

## 2025-04-28 PROCEDURE — 85027 COMPLETE CBC AUTOMATED: CPT

## 2025-04-28 PROCEDURE — 87040 BLOOD CULTURE FOR BACTERIA: CPT

## 2025-04-28 PROCEDURE — 96376 TX/PRO/DX INJ SAME DRUG ADON: CPT

## 2025-04-28 RX ORDER — HYDROMORPHONE HCL/PF 1 MG/ML
0.2 SYRINGE (ML) INJECTION EVERY 4 HOURS PRN
Status: DISCONTINUED | OUTPATIENT
Start: 2025-04-28 | End: 2025-04-29

## 2025-04-28 RX ORDER — DOCUSATE SODIUM 100 MG/1
100 CAPSULE, LIQUID FILLED ORAL 2 TIMES DAILY
Status: DISCONTINUED | OUTPATIENT
Start: 2025-04-29 | End: 2025-05-06 | Stop reason: HOSPADM

## 2025-04-28 RX ORDER — ASCORBIC ACID, THIAMINE MONONITRATE,RIBOFLAVIN, NIACINAMIDE, PYRIDOXINE HYDROCHLORIDE, FOLIC ACID, CYANOCOBALAMIN, BIOTIN, CALCIUM PANTOTHENATE, 100; 1.5; 1.7; 20; 10; 1; 6000; 150000; 5 MG/1; MG/1; MG/1; MG/1; MG/1; MG/1; UG/1; UG/1; MG/1
1 CAPSULE, LIQUID FILLED ORAL
Status: DISCONTINUED | OUTPATIENT
Start: 2025-04-29 | End: 2025-05-06 | Stop reason: HOSPADM

## 2025-04-28 RX ORDER — ISOSORBIDE MONONITRATE 60 MG/1
60 TABLET, EXTENDED RELEASE ORAL DAILY
Status: DISCONTINUED | OUTPATIENT
Start: 2025-04-29 | End: 2025-05-06 | Stop reason: HOSPADM

## 2025-04-28 RX ORDER — ACETAMINOPHEN 325 MG/1
650 TABLET ORAL EVERY 6 HOURS PRN
Status: DISCONTINUED | OUTPATIENT
Start: 2025-04-28 | End: 2025-04-30

## 2025-04-28 RX ORDER — HYDROMORPHONE HCL/PF 1 MG/ML
0.5 SYRINGE (ML) INJECTION ONCE
Status: COMPLETED | OUTPATIENT
Start: 2025-04-28 | End: 2025-04-28

## 2025-04-28 RX ORDER — CALCIUM ACETATE 667 MG/1
1334 CAPSULE ORAL 3 TIMES DAILY
Status: DISCONTINUED | OUTPATIENT
Start: 2025-04-29 | End: 2025-05-06 | Stop reason: HOSPADM

## 2025-04-28 RX ORDER — CARVEDILOL 6.25 MG/1
6.25 TABLET ORAL 2 TIMES DAILY WITH MEALS
Status: DISCONTINUED | OUTPATIENT
Start: 2025-04-29 | End: 2025-05-02

## 2025-04-28 RX ORDER — ASPIRIN 81 MG/1
81 TABLET, CHEWABLE ORAL DAILY
Status: DISCONTINUED | OUTPATIENT
Start: 2025-04-29 | End: 2025-05-06 | Stop reason: HOSPADM

## 2025-04-28 RX ORDER — ATORVASTATIN CALCIUM 80 MG/1
80 TABLET, FILM COATED ORAL DAILY
Status: DISCONTINUED | OUTPATIENT
Start: 2025-04-29 | End: 2025-05-06 | Stop reason: HOSPADM

## 2025-04-28 RX ORDER — OXYCODONE HYDROCHLORIDE 5 MG/1
5 TABLET ORAL EVERY 4 HOURS PRN
Status: DISCONTINUED | OUTPATIENT
Start: 2025-04-28 | End: 2025-04-30

## 2025-04-28 RX ORDER — CLINDAMYCIN PHOSPHATE 600 MG/50ML
600 INJECTION, SOLUTION INTRAVENOUS ONCE
Status: COMPLETED | OUTPATIENT
Start: 2025-04-28 | End: 2025-04-28

## 2025-04-28 RX ORDER — NIFEDIPINE 30 MG/1
30 TABLET, EXTENDED RELEASE ORAL
Status: DISCONTINUED | OUTPATIENT
Start: 2025-04-29 | End: 2025-05-06 | Stop reason: HOSPADM

## 2025-04-28 RX ORDER — HYDROMORPHONE HCL/PF 1 MG/ML
0.2 SYRINGE (ML) INJECTION ONCE
Refills: 0 | Status: COMPLETED | OUTPATIENT
Start: 2025-04-28 | End: 2025-04-28

## 2025-04-28 RX ORDER — ENOXAPARIN SODIUM 100 MG/ML
40 INJECTION SUBCUTANEOUS DAILY
Status: DISCONTINUED | OUTPATIENT
Start: 2025-04-29 | End: 2025-04-29

## 2025-04-28 RX ORDER — TORSEMIDE 100 MG/1
100 TABLET ORAL DAILY
Status: DISCONTINUED | OUTPATIENT
Start: 2025-04-29 | End: 2025-05-06 | Stop reason: HOSPADM

## 2025-04-28 RX ORDER — SEVELAMER HYDROCHLORIDE 800 MG/1
800 TABLET, FILM COATED ORAL
Status: DISCONTINUED | OUTPATIENT
Start: 2025-04-29 | End: 2025-05-06 | Stop reason: HOSPADM

## 2025-04-28 RX ORDER — INSULIN LISPRO 100 [IU]/ML
2-12 INJECTION, SOLUTION INTRAVENOUS; SUBCUTANEOUS
Status: DISCONTINUED | OUTPATIENT
Start: 2025-04-29 | End: 2025-05-06 | Stop reason: HOSPADM

## 2025-04-28 RX ADMIN — IOHEXOL 100 ML: 350 INJECTION, SOLUTION INTRAVENOUS at 22:04

## 2025-04-28 RX ADMIN — HYDROMORPHONE HYDROCHLORIDE 0.2 MG: 1 INJECTION, SOLUTION INTRAMUSCULAR; INTRAVENOUS; SUBCUTANEOUS at 23:53

## 2025-04-28 RX ADMIN — HYDROMORPHONE HYDROCHLORIDE 0.5 MG: 1 INJECTION, SOLUTION INTRAMUSCULAR; INTRAVENOUS; SUBCUTANEOUS at 22:24

## 2025-04-28 RX ADMIN — HYDROMORPHONE HYDROCHLORIDE 0.5 MG: 1 INJECTION, SOLUTION INTRAMUSCULAR; INTRAVENOUS; SUBCUTANEOUS at 20:27

## 2025-04-28 RX ADMIN — CLINDAMYCIN PHOSPHATE 600 MG: 600 INJECTION, SOLUTION INTRAVENOUS at 22:25

## 2025-04-28 NOTE — ED PROVIDER NOTES
Time reflects when diagnosis was documented in both MDM as applicable and the Disposition within this note       Time User Action Codes Description Comment    4/28/2025 10:36 PM Yokubaitis, Adi Add [L02.91] Abscess     4/28/2025 10:36 PM Yokubaitis, Adi Add [L03.314] Cellulitis of groin     4/28/2025 10:36 PM Yokubaitis, Adi Add [N18.6] ESRD (end stage renal disease) ([facility])     4/29/2025  2:51 PM Marco Morgan Modify [L02.91] Abscess     4/29/2025  2:51 PM Marco Morgan Add [L03.314] Cellulitis of groin, right     4/30/2025 12:24 PM Rachell Trinidad Modify [L02.91] Abscess           ED Disposition       ED Disposition   Admit    Condition   Stable    Date/Time   Mon Apr 28, 2025 10:36 PM    Comment   Case was discussed with GERRI and the patient's admission status was agreed to be Admission Status: inpatient status to the service of GERRI .               Assessment & Plan       Medical Decision Making  44-year-old male presenting to the emergency department due to infection of the groin.  Patient has an active draining abscess, significant swelling concerning for larger collection versus Santhosh's gangrene versus other gangrenous infection at this time.  Labs and imaging obtained showing cellulitic changes without obvious significant collection.  Due to extent and patient baseline poor health status, discussed admission which patient is agreeable with for IV antibiotics and further treatment as needed.    Amount and/or Complexity of Data Reviewed  Labs: ordered.  Radiology: ordered.    Risk  Prescription drug management.  Decision regarding hospitalization.             Medications   aspirin chewable tablet 81 mg (has no administration in time range)   atorvastatin (LIPITOR) tablet 80 mg (has no administration in time range)   vit B complex-vit C-folic acid (Renal Caps) capsule 1 capsule (has no administration in time range)   calcium acetate (PHOSLO) capsule 1,334 mg (has no administration in time range)    carvedilol (COREG) tablet 6.25 mg (has no administration in time range)   Cholecalciferol (VITAMIN D3) tablet 2,000 Units (has no administration in time range)   isosorbide mononitrate (IMDUR) 24 hr tablet 60 mg (has no administration in time range)   NIFEdipine (PROCARDIA XL) 24 hr tablet 30 mg (has no administration in time range)   sevelamer (RENAGEL) tablet 800 mg (has no administration in time range)   torsemide (DEMADEX) tablet 100 mg (has no administration in time range)   naloxone (NARCAN) 0.04 mg/mL syringe 0.04 mg (has no administration in time range)   docusate sodium (COLACE) capsule 100 mg (has no administration in time range)   insulin lispro (HumALOG/ADMELOG) 100 units/mL subcutaneous injection 2-12 Units (has no administration in time range)   heparin (porcine) subcutaneous injection 7,500 Units (has no administration in time range)   vancomycin (VANCOCIN) IVPB (premix in dextrose) 750 mg 150 mL (has no administration in time range)   HYDROmorphone (DILAUDID) injection 0.5 mg (0.5 mg Intravenous Given 4/28/25 2027)   iohexol (OMNIPAQUE) 350 MG/ML injection (MULTI-DOSE) 100 mL (100 mL Intravenous Given 4/28/25 2204)   clindamycin in dextrose 5% (Cleocin) IVPB (premix) 600 mg 50 mL (0 mg Intravenous Stopped 4/28/25 2255)   HYDROmorphone (DILAUDID) injection 0.5 mg (0.5 mg Intravenous Given 4/28/25 2224)   HYDROmorphone (DILAUDID) injection 0.2 mg (0.2 mg Intravenous Given 4/28/25 2353)   vancomycin (VANCOCIN) 2,000 mg in sodium chloride 0.9 % 500 mL IVPB (has no administration in time range)       ED Risk Strat Scores                    No data recorded        SBIRT 20yo+      Flowsheet Row Most Recent Value   Initial Alcohol Screen: US AUDIT-C     1. How often do you have a drink containing alcohol? 0 Filed at: 04/28/2025 1938   2. How many drinks containing alcohol do you have on a typical day you are drinking?  0 Filed at: 04/28/2025 1938   3a. Male UNDER 65: How often do you have five or more  "drinks on one occasion? 0 Filed at: 04/28/2025 1938   Audit-C Score 0 Filed at: 04/28/2025 1938   ALEX: How many times in the past year have you...    Used an illegal drug or used a prescription medication for non-medical reasons? Never Filed at: 04/28/2025 1938                            History of Present Illness       Chief Complaint   Patient presents with    Abscess     Pt reports had two pimple like abscess to right groin area 3 days ago \"one ruptured the other did not\" increased pain. No fevers. Requesting I+D which he has had in past. Pt on dialysis MWF       Past Medical History:   Diagnosis Date    Acute hypoxic respiratory failure ([facility]) 10/07/2023    Arthritis     Breathing difficulty     Cellulitis 10/07/2023    Chronic kidney disease     end stage renal disease    Coronary artery disease     Diabetes mellitus ([facility])     Diabetic neuropathy ([facility]) 05/28/2021    Heart disease     CAD   s/p ptca with 1 stent 2012    Hidradenitis suppurativa     groin    History of transfusion     Hyperlipidemia     Hypertension     MI (myocardial infarction) ([facility])     \" silent \" M.I. in the past    Morbid obesity with BMI of 50.0-59.9, adult ([facility])     Nicotine dependence     Obesity     PE (pulmonary thromboembolism) ([facility]) 06/2024    small on left side, takes coumadin    Pilonidal cyst     Type 1 non-ST elevation myocardial infarction (NSTEMI) ([facility]) 11/29/2020      Past Surgical History:   Procedure Laterality Date    CARDIAC SURGERY      CORONARY ANGIOPLASTY WITH STENT PLACEMENT      x2    INCISION AND DRAINAGE OF WOUND N/A 01/24/2017    Procedure: INCISION AND DRAINAGE (I&D) BUTTOCK, PILONIDAL CYST;  Surgeon: Simba Adhikari MD;  Location: [facility];  Service:     INCISION AND DRAINAGE OF WOUND Right 4/30/2025    Procedure: INCISION AND DRAINAGE (I&D) GROIN;  Surgeon: Stef Mooney MD;  Location: [facility];  Service: General    IR BIOPSY BONE MARROW  06/12/2024    IR BIOPSY KIDNEY RANDOM  07/12/2024    IR BIOPSY KIDNEY " RANDOM  07/19/2024    IR TEMPORARY DIALYSIS CATHETER PLACEMENT  06/06/2024    IR TUNNELED CENTRAL LINE PLACEMENT  06/14/2024    IR TUNNELED DIALYSIS CATHETER CHECK/CHANGE/REPOSITION/ANGIOPLASTY  1/2/2025    IR TUNNELED DIALYSIS CATHETER CHECK/CHANGE/REPOSITION/ANGIOPLASTY  2/5/2025    LEG SURGERY Left     I&D of left leg 2012. Had resp failure and had to be intubated during procedure.    UT ARTERIOVENOUS ANASTOMOSIS OPEN DIRECT Left 09/26/2024    Procedure: left arm radio-cephalic AVF creation;  Surgeon: Scout James DO;  Location: [facility];  Service: Vascular    UT ARTERIOVENOUS ANASTOMOSIS OPEN DIRECT Left 12/23/2024    Procedure: left arm brachio-basilic AVF creation;  Surgeon: Scout James DO;  Location: [facility];  Service: Vascular    UT DEBRIDEMENT SUBCUTANEOUS TISSUE 1ST 20 SQ CM/< Left 07/06/2021    Procedure: DEBRIDEMENT WOUND (WASH OUT);  Surgeon: Sudheer Mcfarlane MD;  [facility]: [facility];  Service: General    UT EXC B9 LESION MRGN XCP SK TG T/A/L >4.0 CM Left 04/06/2021    Procedure: EXCISION THIGH MASS;  Surgeon: Sudheer Mcfarlane MD;  [facility];  Service: General    UT EXCISION H/P/P/U COMPLEX REPAIR Left 07/06/2021    Procedure: EXCISION PERINEAL ABSCESS LEFT THIGH;  Surgeon: Sudheer Mcfarlane MD;  [facility];  Service: General    UT NEGATIVE PRESSURE WOUND THERAPY DME <= 50 SQ CM Left 04/06/2021    Procedure: APPLICATION VAC DRESSING THIGH;  Surgeon: Sudheer Mcfarlane MD;  [facility];  Service: General    UT REVJ OPN ARVEN FSTL W/O THRMBC DIAL GRF Left 3/6/2025    Procedure: left arm basilic fistula elevation;  Surgeon: Scout James DO;  [facility];  Service: Vascular    WOUND DEBRIDEMENT Left 04/17/2021    Procedure: DEBRIDEMENT WOUND AND DRESSING CHANGE (WASH OUT);  Surgeon: Mahin Traore DO;  [facility];  Service: General    WOUND DEBRIDEMENT Left 04/22/2021    Procedure: DEBRIDEMENT LOWER EXTREMITY (WASH OUT), left groin and thigh;  Surgeon: Sudheer Mcfarlane MD;   [facility];  Service: General    WOUND DEBRIDEMENT Left 2021    Procedure: DEBRIDEMENT LOWER EXTREMITY (WASH OUT);  Surgeon: Zak Castanon DO;  [facility];  Service: General    WOUND DEBRIDEMENT Left 2021    Procedure: Washout left thigh wound and closure;  Surgeon: Amor Jaramillo DO;  [facility];  Service: General      Family History   Problem Relation Age of Onset    Heart disease Father     Diabetes Father     Hypertension Mother     Heart disease Mother       Social History     Tobacco Use    Smoking status: Former     Current packs/day: 0.00     Average packs/day: 1.5 packs/day for 20.0 years (29.9 ttl pk-yrs)     Types: Cigarettes     Start date: 2021     Quit date: 2021     Years since quittin.1     Passive exposure: Past    Smokeless tobacco: Never   Vaping Use    Vaping status: Never Used   Substance Use Topics    Alcohol use: Not Currently    Drug use: Never      E-Cigarette/Vaping    E-Cigarette Use Never User       E-Cigarette/Vaping Substances    Nicotine No     THC No     CBD No     Flavoring No     Other No     Unknown No       I have reviewed and agree with the history as documented.     44-year-old male with ESRD on HD Monday/Wednesday/Friday, presents emergency department due to infection of the right groin area.  Patient notes that about 3 days ago he noticed pimples pop up.  Then, 1 near the start of his scrotum opened up and started draining purulent fluid.  He has had continued drainage over the past day and then started noticing increased swelling and pain in the pubic region so he came to emergency department for evaluation and management.  Notes that he has soaked through underwear within a few hours.  Denies fevers, chills, nausea, vomiting, or other associated complaints.        Review of Systems   All other systems reviewed and are negative.          Objective       ED Triage Vitals [25 1936]   Temperature Pulse Blood  Pressure Respirations SpO2 Patient Position - Orthostatic VS   98.1 °F (36.7 °C) 96 134/71 20 98 % Sitting      Temp Source Heart Rate Source BP Location FiO2 (%) Pain Score    Temporal Monitor Right arm -- 10 - Worst Possible Pain      Vitals      Date and Time Temp Pulse SpO2 Resp BP Pain Score FACES Pain Rating User   05/01/25 1230 -- 76 -- 18 110/65 -- -- PS   05/01/25 1200 -- 78 -- 18 106/54 -- -- PS   05/01/25 1130 -- 78 -- 18 85/51 -- -- PS   05/01/25 1100 -- 76 -- 18 120/62 -- -- PS   05/01/25 1038 100 °F (37.8 °C) 76 -- 18 99/57 -- -- PS   05/01/25 1029 -- -- -- -- -- 10 - Worst Possible Pain -- AI   05/01/25 0803 -- -- -- -- -- 9 -- AI   05/01/25 0800 -- -- -- -- -- 10 - Worst Possible Pain -- AI   05/01/25 0722 -- -- -- -- -- 10 - Worst Possible Pain -- AI   05/01/25 0700 -- 77 90 % 18 -- -- -- NH   05/01/25 0659 98.2 °F (36.8 °C) -- -- 18 122/68 -- -- DII   05/01/25 0432 -- -- -- -- -- 9 -- IE   05/01/25 0304 -- -- -- -- -- 10 - Worst Possible Pain -- DB   04/30/25 2313 -- -- -- -- -- 9 -- IE   04/30/25 2254 -- 79 91 % -- -- -- -- AF   04/30/25 2254 98.2 °F (36.8 °C) -- -- 19 117/51 -- -- DII   04/30/25 2217 -- -- -- -- -- 10 - Worst Possible Pain -- IE   04/30/25 2042 99 °F (37.2 °C) 86 95 % -- -- -- -- DII   04/30/25 2001 -- 92 -- -- -- Med Not Given for Pain - for MAR use only -- IE   04/30/25 1959 -- 92 94 % -- -- -- -- IE   04/30/25 1959 101.7 °F (38.7 °C) -- -- -- 115/51 -- -- DII   04/30/25 1706 -- 78 -- -- -- -- -- AM   04/30/25 1706 -- -- -- -- 126/70 -- -- DII   04/30/25 1703 -- -- -- -- -- 10 - Worst Possible Pain -- AM   04/30/25 1550 -- 74 -- 18 128/70 -- -- DM   04/30/25 1500 -- 75 100 % -- 127/70 -- -- AM   04/30/25 1500 -- -- -- 18 -- -- -- DM   04/30/25 1430 -- 74 -- 18 120/61 -- -- DM   04/30/25 1417 -- -- -- -- 118/60 -- -- AH   04/30/25 1416 -- 77 97 % -- -- -- -- AM   04/30/25 1416 97.6 °F (36.4 °C) -- -- 18 -- -- -- DM   04/30/25 1416 -- -- -- -- 118/60 -- -- DII   04/30/25 1416 --  -- -- -- 118/60 -- --    04/30/25 1413 -- -- -- -- -- 8 -- AM   04/30/25 1400 -- 87 95 % 20 -- -- -- AM   04/30/25 1400 98.1 °F (36.7 °C) -- -- -- 120/58 -- -- DII   04/30/25 1330 -- 81 96 % 18 159/65 5 -- JKD   04/30/25 1323 -- -- -- -- -- 7 -- JKD   04/30/25 1315 -- 79 95 % 18 159/65 7 -- JKD   04/30/25 1300 -- 81 94 % 18 151/65 -- -- KD   04/30/25 1259 -- -- -- -- -- 7 -- KD   04/30/25 1245 -- 84 94 % 18 148/67 8 -- [facility]   04/30/25 1233 98.1 °F (36.7 °C) 90 95 % 18 140/60 No Pain -- KD   04/30/25 1046 -- -- -- -- -- 10 - Worst Possible Pain --    04/30/25 0854 -- -- -- -- -- Med Not Given for Pain - for MAR use only -- AM   04/30/25 0849 -- 87 95 % -- -- -- -- AM   04/30/25 0849 101.8 °F (38.8 °C) -- -- 20 103/60 -- -- DII   04/30/25 0846 -- -- -- -- -- 10 - Worst Possible Pain -- AM   04/30/25 0624 -- -- -- -- -- 10 - Worst Possible Pain -- IE   04/30/25 0332 -- -- -- -- -- 10 - Worst Possible Pain -- IE   04/30/25 0107 -- -- -- -- -- 10 - Worst Possible Pain -- IE   04/29/25 2312 -- 79 100 % -- -- -- -- IE   04/29/25 2312 97.7 °F (36.5 °C) -- -- 20 137/68 -- -- DII   04/29/25 2120 -- -- -- -- -- 10 - Worst Possible Pain -- IE   04/29/25 2005 -- -- -- -- -- 10 - Worst Possible Pain -- IE   04/29/25 1947 -- 74 93 % -- -- -- -- IE   04/29/25 1947 97.7 °F (36.5 °C) -- -- 18 141/69 -- -- DII   04/29/25 1709 -- -- -- -- -- 10 - Worst Possible Pain -- AM   04/29/25 1554 -- -- -- -- -- 9 -- AM   04/29/25 1512 -- 73 94 % 19 -- -- -- AM   04/29/25 1512 97.6 °F (36.4 °C) -- -- -- 128/62 -- -- DII   04/29/25 1421 -- -- -- -- -- 10 - Worst Possible Pain -- AM   04/29/25 1201 -- -- -- -- -- 10 - Worst Possible Pain -- BR   04/29/25 1016 -- -- -- -- -- 9 -- AH   04/29/25 0832 -- 78 97 % 20 -- 10 - Worst Possible Pain -- AM   04/29/25 0734 97.6 °F (36.4 °C) -- -- -- 129/62 -- -- DII   04/29/25 0650 -- -- -- -- -- 10 - Worst Possible Pain -- DL   04/29/25 0531 -- -- -- -- -- 10 - Worst Possible Pain -- DL   04/29/25 0123  -- -- 95 % -- -- 10 - Worst Possible Pain -- DL   04/29/25 0100 98.1 °F (36.7 °C) 82 -- -- -- -- -- DL   04/29/25 0050 98.1 °F (36.7 °C) -- -- -- 141/81 -- -- DII   04/29/25 0044 -- -- -- -- -- 10 - Worst Possible Pain -- DL   04/29/25 0000 -- 82 95 % 18 135/58 -- -- AM   04/28/25 2353 -- -- -- -- -- 8 -- AM   04/28/25 2315 -- 83 94 % 18 134/60 -- -- AM   04/28/25 2224 -- -- -- -- -- 8 --    04/28/25 2200 -- 84 92 % 20 123/60 -- --    04/28/25 2155 -- 83 95 % 20 136/63 -- --    04/28/25 2027 -- -- -- -- -- 10 - Worst Possible Pain --    04/28/25 1936 98.1 °F (36.7 °C) 96 98 % 20 134/71 10 - Worst Possible Pain -- [facility]            Physical Exam  Constitutional:       General: He is not in acute distress.     Appearance: Normal appearance. He is not ill-appearing or toxic-appearing.   HENT:      Head: Normocephalic and atraumatic.      Mouth/Throat:      Mouth: Mucous membranes are moist.   Eyes:      Extraocular Movements: Extraocular movements intact.   Pulmonary:      Effort: Pulmonary effort is normal.   Abdominal:      Palpations: Abdomen is soft.   Skin:     General: Skin is warm.      Comments: Cellulitic changes across pubis and extending into right proximal scrotum with actively draining lesion on proximal scrotum. Tender to light palpation.    Neurological:      Mental Status: He is alert.   Psychiatric:         Mood and Affect: Mood normal.         Results Reviewed       Procedure Component Value Units Date/Time    Blood culture [[id number]] Collected: 04/28/25 2219    Lab Status: Preliminary result Specimen: Blood from Arm, Right Updated: 05/01/25 1001     Blood Culture No Growth at 48 hrs.    Blood culture [[id number]] Collected: 04/28/25 2221    Lab Status: Preliminary result Specimen: Blood from Arm, Right Updated: 05/01/25 1001     Blood Culture No Growth at 48 hrs.    Basic metabolic panel [[id number]]  (Abnormal) Collected: 04/29/25 0527    Lab Status: Final result Specimen: Blood from Arm, Left  Updated: 04/29/25 0602     Sodium 132 mmol/L      Potassium 4.2 mmol/L      Chloride 92 mmol/L      CO2 20 mmol/L      ANION GAP 20 mmol/L      BUN 57 mg/dL      Creatinine 8.67 mg/dL      Glucose 191 mg/dL      Calcium 8.9 mg/dL      eGFR 6 ml/min/1.73sq m     Narrative:      National Kidney Disease Foundation guidelines for Chronic Kidney Disease (CKD):     Stage 1 with normal or high GFR (GFR > 90 mL/min/1.73 square meters)    Stage 2 Mild CKD (GFR = 60-89 mL/min/1.73 square meters)    Stage 3A Moderate CKD (GFR = 45-59 mL/min/1.73 square meters)    Stage 3B Moderate CKD (GFR = 30-44 mL/min/1.73 square meters)    Stage 4 Severe CKD (GFR = 15-29 mL/min/1.73 square meters)    Stage 5 End Stage CKD (GFR <15 mL/min/1.73 square meters)  Note: GFR calculation is accurate only with a steady state creatinine    CBC and differential [[id number]]  (Abnormal) Collected: 04/29/25 0527    Lab Status: Final result Specimen: Blood from Arm, Left Updated: 04/29/25 0555     WBC 9.82 Thousand/uL      RBC 2.96 Million/uL      Hemoglobin 9.4 g/dL      Hematocrit 28.9 %      MCV 98 fL      MCH 31.8 pg      MCHC 32.5 g/dL      RDW 12.9 %      MPV 9.2 fL      Platelets 352 Thousands/uL      nRBC 0 /100 WBCs      Absolute Neutrophils 6.75 Thousands/µL      Absolute Immature Grans 0.43 Thousand/uL      Absolute Lymphocytes 1.59 Thousands/µL      Absolute Monocytes 0.70 Thousand/µL      Eosinophils Absolute 0.30 Thousand/µL      Basophils Absolute 0.05 Thousands/µL     Narrative:      See Manual Diff Done Within 3 Days  This is an appended report.  These results have been appended to a previously verified report.    Procalcitonin [[id number]]  (Abnormal) Collected: 04/28/25 2316    Lab Status: Final result Specimen: Blood from Arm, Right Updated: 04/29/25 0010     Procalcitonin 0.86 ng/ml     Wound culture and Gram stain [[id number]]     Lab Status: No result Specimen: Wound from Groin     Lactic acid, plasma (w/reflex if result > 2.0)  [[id number]]  (Normal) Collected: 04/28/25 2219    Lab Status: Final result Specimen: Blood from Arm, Right Updated: 04/28/25 2245     LACTIC ACID 0.7 mmol/L     Narrative:      Result may be elevated if tourniquet was used during collection.    Protime-INR [[id number]]  (Normal) Collected: 04/28/25 2219    Lab Status: Final result Specimen: Blood from Arm, Right Updated: 04/28/25 2238     Protime 14.5 seconds      INR 1.08    Narrative:      INR Therapeutic Range    Indication                                             INR Range      Atrial Fibrillation                                               2.0-3.0  Hypercoagulable State                                    2.0.2.3  Left Ventricular Asist Device                            2.0-3.0  Mechanical Heart Valve                                  -    Aortic(with afib, MI, embolism, HF, LA enlargement,    and/or coagulopathy)                                     2.0-3.0 (2.5-3.5)     Mitral                                                             2.5-3.5  Prosthetic/Bioprosthetic Heart Valve               2.0-3.0  Venous thromboembolism (VTE: VT, PE        2.0-3.0    APTT [[id number]]  (Normal) Collected: 04/28/25 2219    Lab Status: Final result Specimen: Blood from Arm, Right Updated: 04/28/25 2238     PTT 32 seconds     RBC Morphology Reflex Test [[id number]] Collected: 04/28/25 2024    Lab Status: Final result Specimen: Blood from Arm, Right Updated: 04/28/25 2201    CBC and differential [[id number]]  (Abnormal) Collected: 04/28/25 2024    Lab Status: Final result Specimen: Blood from Arm, Right Updated: 04/28/25 2106     WBC 10.66 Thousand/uL      RBC 2.93 Million/uL      Hemoglobin 9.3 g/dL      Hematocrit 28.0 %      MCV 96 fL      MCH 31.7 pg      MCHC 33.2 g/dL      RDW 12.9 %      MPV 9.0 fL      Platelets 336 Thousands/uL     Narrative:      This is an appended report.  These results have been appended to a previously verified report.    Manual  Differential(PHLEBS Do Not Order) [[id number]]  (Abnormal) Collected: 04/28/25 2024    Lab Status: Final result Specimen: Blood from Arm, Right Updated: 04/28/25 2106     Segmented % 78 %      Lymphocytes % 13 %      Monocytes % 3 %      Eosinophils % 4 %      Basophils % 1 %      Plasma Cells % 1 %      Absolute Neutrophils 8.31 Thousand/uL      Absolute Lymphocytes 1.49 Thousand/uL      Absolute Monocytes 0.32 Thousand/uL      Absolute Eosinophils 0.43 Thousand/uL      Absolute Basophils 0.11 Thousand/uL      Total Counted --     RBC Morphology Present     Platelet Estimate Adequate     Anisocytosis Present    Comprehensive metabolic panel [[id number]]  (Abnormal) Collected: 04/28/25 2024    Lab Status: Final result Specimen: Blood from Arm, Right Updated: 04/28/25 2043     Sodium 134 mmol/L      Potassium 4.0 mmol/L      Chloride 92 mmol/L      CO2 23 mmol/L      ANION GAP 19 mmol/L      BUN 50 mg/dL      Creatinine 7.91 mg/dL      Glucose 169 mg/dL      Calcium 9.1 mg/dL      AST 12 U/L      ALT 15 U/L      Alkaline Phosphatase 84 U/L      Total Protein 8.0 g/dL      Albumin 3.6 g/dL      Total Bilirubin 0.24 mg/dL      eGFR 7 ml/min/1.73sq m     Narrative:      National Kidney Disease Foundation guidelines for Chronic Kidney Disease (CKD):     Stage 1 with normal or high GFR (GFR > 90 mL/min/1.73 square meters)    Stage 2 Mild CKD (GFR = 60-89 mL/min/1.73 square meters)    Stage 3A Moderate CKD (GFR = 45-59 mL/min/1.73 square meters)    Stage 3B Moderate CKD (GFR = 30-44 mL/min/1.73 square meters)    Stage 4 Severe CKD (GFR = 15-29 mL/min/1.73 square meters)    Stage 5 End Stage CKD (GFR <15 mL/min/1.73 square meters)  Note: GFR calculation is accurate only with a steady state creatinine            CT abdomen pelvis with contrast   Final Interpretation by Warren Benoit MD (04/28 2308)      1.  Extensive subcutaneous inflammatory stranding throughout the right groin, ventral lower pelvic wall, and bilateral  scrotal sacs noted, right greater than left. Findings suggest diffuse cellulitis/fasciitis or superficial infection. No subcutaneous    air noted to suggest necrotizing fasciitis or Santhosh's gangrene. No loculated drainable rim-enhancing fluid collection/abscess. Multiple mildly prominent bilateral inguinal/groin region lymph nodes are seen, likely reactive in etiology.   2.  Subtle diffuse wall thickening of the urinary bladder could be due to under distention or cystitis, recommend correlation with urinalysis.   3.  No evidence of bowel obstruction, inflammation, appendicitis, obstructive uropathy, free air, or free fluid.         Workstation performed: [id number]             Procedures    ED Medication and Procedure Management   Prior to Admission Medications   Prescriptions Last Dose Informant Patient Reported? Taking?   Cholecalciferol (VITAMIN D3) 1,000 units tablet 4/29/2025 Morning Self No Yes   Sig: Take 2 tablets (2,000 Units total) by mouth daily   Diclofenac Sodium (VOLTAREN) 1 % Not Taking Self No No   Sig: Apply 2 g topically 4 (four) times a day   Patient not taking: Reported on 4/29/2025   NIFEdipine (PROCARDIA XL) 30 mg 24 hr tablet 4/28/2025  No Yes   Sig: TAKE ONE TABLET BY MOUTH EVERY DAY AT BEDTIME (GENERIC FOR PROCARDIA XL)   aspirin (ECOTRIN LOW STRENGTH) 81 mg EC tablet 4/28/2025 Self No Yes   Sig: Take 1 tablet (81 mg total) by mouth daily   atorvastatin (LIPITOR) 80 mg tablet 4/28/2025 Self No Yes   Sig: Take 1 tablet (80 mg total) by mouth daily   b complex-vitamin C-folic acid (RENAL) 1 mg 4/28/2025 Self No Yes   Sig: Take 1 capsule by mouth daily with dinner   calcium acetate (PHOSLO) capsule 4/28/2025 Self No Yes   Sig: Take 2 capsules (1,334 mg total) by mouth 3 (three) times a day   carvedilol (COREG) 6.25 mg tablet 4/28/2025 Self No Yes   Sig: Take 1 tablet (6.25 mg total) by mouth 2 (two) times a day with meals   isosorbide mononitrate (IMDUR) 60 mg 24 hr tablet 4/28/2025 Self No  Yes   Sig: Take 1 tablet (60 mg total) by mouth daily   metolazone (ZAROXOLYN) 10 mg tablet Past Week Self No Yes   Sig: Take 1 tablet (10 mg total) by mouth 4 (four) times a week   sevelamer (RENAGEL) 800 mg tablet 4/28/2025 Self No Yes   Sig: Take 1 tablet (800 mg total) by mouth 3 (three) times a day with meals   torsemide (DEMADEX) 100 mg tablet 4/28/2025 Self No Yes   Sig: TAKE ONE TABLET BY MOUTH EVERY DAY (GENERIC FOR DEMADEX)   warfarin (COUMADIN) 10 mg tablet Not Taking Self No No   Sig: Take 1.5 tablets (15 mg total) by mouth daily   Patient not taking: Reported on 3/28/2025      Facility-Administered Medications: None     Current Discharge Medication List        CONTINUE these medications which have NOT CHANGED    Details   aspirin (ECOTRIN LOW STRENGTH) 81 mg EC tablet Take 1 tablet (81 mg total) by mouth daily  Qty: 90 tablet, Refills: 2    Associated Diagnoses: CAD (coronary artery disease)      atorvastatin (LIPITOR) 80 mg tablet Take 1 tablet (80 mg total) by mouth daily  Qty: 90 tablet, Refills: 3    Associated Diagnoses: Dyslipidemia      b complex-vitamin C-folic acid (RENAL) 1 mg Take 1 capsule by mouth daily with dinner  Qty: 30 capsule, Refills: 8    Associated Diagnoses: ESRD (end stage renal disease) on dialysis ([facility])      calcium acetate (PHOSLO) capsule Take 2 capsules (1,334 mg total) by mouth 3 (three) times a day  Qty: 540 capsule, Refills: 3    Associated Diagnoses: Hyperphosphatemia      carvedilol (COREG) 6.25 mg tablet Take 1 tablet (6.25 mg total) by mouth 2 (two) times a day with meals  Qty: 180 tablet, Refills: 1    Associated Diagnoses: Benign hypertension with ESRD (end-stage renal disease) ([facility])      Cholecalciferol (VITAMIN D3) 1,000 units tablet Take 2 tablets (2,000 Units total) by mouth daily  Qty: 60 tablet, Refills: 5    Associated Diagnoses: Vitamin D deficiency      isosorbide mononitrate (IMDUR) 60 mg 24 hr tablet Take 1 tablet (60 mg total) by mouth daily  Qty: 90  tablet, Refills: 2    Associated Diagnoses: Coronary artery disease involving native coronary artery of native heart with angina pectoris ([facility])      metolazone (ZAROXOLYN) 10 mg tablet Take 1 tablet (10 mg total) by mouth 4 (four) times a week  Qty: 50 tablet, Refills: 1    Associated Diagnoses: Benign hypertension with ESRD (end-stage renal disease) ([facility])      NIFEdipine (PROCARDIA XL) 30 mg 24 hr tablet TAKE ONE TABLET BY MOUTH EVERY DAY AT BEDTIME (GENERIC FOR PROCARDIA XL)  Qty: 90 tablet, Refills: 1    Associated Diagnoses: Benign hypertension with ESRD (end-stage renal disease) ([facility])      sevelamer (RENAGEL) 800 mg tablet Take 1 tablet (800 mg total) by mouth 3 (three) times a day with meals  Qty: 540 tablet, Refills: 1    Associated Diagnoses: Hyperphosphatemia      torsemide (DEMADEX) 100 mg tablet TAKE ONE TABLET BY MOUTH EVERY DAY (GENERIC FOR DEMADEX)  Qty: 90 tablet, Refills: 1    Associated Diagnoses: Benign hypertension with ESRD (end-stage renal disease) ([facility])      Diclofenac Sodium (VOLTAREN) 1 % Apply 2 g topically 4 (four) times a day  Qty: 50 g, Refills: 0    Associated Diagnoses: Closed nondisplaced fracture of proximal phalanx of right great toe, initial encounter      warfarin (COUMADIN) 10 mg tablet Take 1.5 tablets (15 mg total) by mouth daily  Qty: 135 tablet, Refills: 5    Associated Diagnoses: Pulmonary embolism ([facility])           No discharge procedures on file.  ED SEPSIS DOCUMENTATION   Time reflects when diagnosis was documented in both MDM as applicable and the Disposition within this note       Time User Action Codes Description Comment    4/28/2025 10:36 PM Yokubaitis, Adi Add [L02.91] Abscess     4/28/2025 10:36 PM Yokubaitis, Adi Add [L03.314] Cellulitis of groin     4/28/2025 10:36 PM Yokubaitis, Adi Add [N18.6] ESRD (end stage renal disease) ([facility])     4/29/2025  2:51 PM Marco Morgan Modify [L02.91] Abscess     4/29/2025  2:51 PM Marco Morgan Add [L03.314] Cellulitis of  groin, right     4/30/2025 12:24 PM Rachell Trinidad Modify [L02.91] Abscess                  Adi Braun MD  05/01/25 124

## 2025-04-28 NOTE — TELEPHONE ENCOUNTER
Operative Scheduling Information:     Hospital:  [facility]     Physician:  Erika     Surgery: Left arm AVG     Urgency:  Standard     Level:  Level 4: Outpatients to be scheduled for screening procedures and elective surgery that can be delayed for longer than one month without reasonable expectation of detriment to patient.     Case Length:  Normal     Post-op Bed:  Outpatient     OR Table:  Standard     Equipment Needs:  None     Medication Instructions:  Coumadin:  Continue hold 5 days     Hydration:  No     Contrast Allergy:  no

## 2025-04-29 PROBLEM — E87.20 METABOLIC ACIDOSIS: Status: ACTIVE | Noted: 2025-04-29

## 2025-04-29 PROBLEM — E83.39 HYPOPHOSPHATEMIA: Status: ACTIVE | Noted: 2025-04-29

## 2025-04-29 LAB
ANION GAP SERPL CALCULATED.3IONS-SCNC: 20 MMOL/L (ref 4–13)
BASOPHILS # BLD AUTO: 0.05 THOUSANDS/ÂΜL (ref 0–0.1)
BUN SERPL-MCNC: 57 MG/DL (ref 5–25)
CALCIUM SERPL-MCNC: 8.9 MG/DL (ref 8.4–10.2)
CHLORIDE SERPL-SCNC: 92 MMOL/L (ref 96–108)
CO2 SERPL-SCNC: 20 MMOL/L (ref 21–32)
CREAT SERPL-MCNC: 8.67 MG/DL (ref 0.6–1.3)
EOSINOPHIL # BLD AUTO: 0.3 THOUSAND/ÂΜL (ref 0–0.61)
ERYTHROCYTE [DISTWIDTH] IN BLOOD BY AUTOMATED COUNT: 12.9 % (ref 11.6–15.1)
GFR SERPL CREATININE-BSD FRML MDRD: 6 ML/MIN/1.73SQ M
GLUCOSE SERPL-MCNC: 135 MG/DL (ref 65–140)
GLUCOSE SERPL-MCNC: 142 MG/DL (ref 65–140)
GLUCOSE SERPL-MCNC: 166 MG/DL (ref 65–140)
GLUCOSE SERPL-MCNC: 185 MG/DL (ref 65–140)
GLUCOSE SERPL-MCNC: 191 MG/DL (ref 65–140)
HCT VFR BLD AUTO: 28.9 % (ref 36.5–49.3)
HGB BLD-MCNC: 9.4 G/DL (ref 12–17)
IMM GRANULOCYTES # BLD AUTO: 0.43 THOUSAND/UL (ref 0–0.2)
LYMPHOCYTES # BLD AUTO: 1.59 THOUSANDS/ÂΜL (ref 0.6–4.47)
MCH RBC QN AUTO: 31.8 PG (ref 26.8–34.3)
MCHC RBC AUTO-ENTMCNC: 32.5 G/DL (ref 31.4–37.4)
MCV RBC AUTO: 98 FL (ref 82–98)
MONOCYTES # BLD AUTO: 0.7 THOUSAND/ÂΜL (ref 0.17–1.22)
NEUTROPHILS # BLD AUTO: 6.75 THOUSANDS/ÂΜL (ref 1.85–7.62)
NRBC BLD AUTO-RTO: 0 /100 WBCS
PLATELET # BLD AUTO: 352 THOUSANDS/UL (ref 149–390)
PMV BLD AUTO: 9.2 FL (ref 8.9–12.7)
POTASSIUM SERPL-SCNC: 4.2 MMOL/L (ref 3.5–5.3)
PROCALCITONIN SERPL-MCNC: 0.86 NG/ML
RBC # BLD AUTO: 2.96 MILLION/UL (ref 3.88–5.62)
SODIUM SERPL-SCNC: 132 MMOL/L (ref 135–147)
WBC # BLD AUTO: 9.82 THOUSAND/UL (ref 4.31–10.16)

## 2025-04-29 PROCEDURE — 87081 CULTURE SCREEN ONLY: CPT | Performed by: FAMILY MEDICINE

## 2025-04-29 PROCEDURE — 82948 REAGENT STRIP/BLOOD GLUCOSE: CPT

## 2025-04-29 PROCEDURE — NC001 PR NO CHARGE: Performed by: SPECIALIST

## 2025-04-29 PROCEDURE — 0Y953ZZ DRAINAGE OF RIGHT INGUINAL REGION, PERCUTANEOUS APPROACH: ICD-10-PCS | Performed by: SPECIALIST

## 2025-04-29 PROCEDURE — 87076 CULTURE ANAEROBE IDENT EACH: CPT | Performed by: PHYSICIAN ASSISTANT

## 2025-04-29 PROCEDURE — 87147 CULTURE TYPE IMMUNOLOGIC: CPT | Performed by: PHYSICIAN ASSISTANT

## 2025-04-29 PROCEDURE — 87185 SC STD ENZYME DETCJ PER NZM: CPT | Performed by: PHYSICIAN ASSISTANT

## 2025-04-29 PROCEDURE — 87070 CULTURE OTHR SPECIMN AEROBIC: CPT | Performed by: PHYSICIAN ASSISTANT

## 2025-04-29 PROCEDURE — 10160 PNXR ASPIR ABSC HMTMA BULLA: CPT | Performed by: PHYSICIAN ASSISTANT

## 2025-04-29 PROCEDURE — 99222 1ST HOSP IP/OBS MODERATE 55: CPT | Performed by: SPECIALIST

## 2025-04-29 PROCEDURE — 99232 SBSQ HOSP IP/OBS MODERATE 35: CPT | Performed by: INTERNAL MEDICINE

## 2025-04-29 PROCEDURE — 0V953ZZ DRAINAGE OF SCROTUM, PERCUTANEOUS APPROACH: ICD-10-PCS | Performed by: SPECIALIST

## 2025-04-29 PROCEDURE — 80048 BASIC METABOLIC PNL TOTAL CA: CPT

## 2025-04-29 PROCEDURE — 87075 CULTR BACTERIA EXCEPT BLOOD: CPT | Performed by: PHYSICIAN ASSISTANT

## 2025-04-29 PROCEDURE — 87205 SMEAR GRAM STAIN: CPT | Performed by: PHYSICIAN ASSISTANT

## 2025-04-29 PROCEDURE — 85027 COMPLETE CBC AUTOMATED: CPT

## 2025-04-29 RX ORDER — VANCOMYCIN HYDROCHLORIDE 750 MG/150ML
7.5 INJECTION, SOLUTION INTRAVENOUS
Status: DISCONTINUED | OUTPATIENT
Start: 2025-04-30 | End: 2025-05-01

## 2025-04-29 RX ORDER — HYDROMORPHONE HCL/PF 1 MG/ML
0.5 SYRINGE (ML) INJECTION ONCE
Refills: 0 | Status: COMPLETED | OUTPATIENT
Start: 2025-04-29 | End: 2025-04-29

## 2025-04-29 RX ORDER — LIDOCAINE HYDROCHLORIDE 10 MG/ML
20 INJECTION, SOLUTION EPIDURAL; INFILTRATION; INTRACAUDAL; PERINEURAL ONCE
Status: COMPLETED | OUTPATIENT
Start: 2025-04-29 | End: 2025-04-29

## 2025-04-29 RX ORDER — HEPARIN SODIUM 5000 [USP'U]/ML
7500 INJECTION, SOLUTION INTRAVENOUS; SUBCUTANEOUS EVERY 8 HOURS SCHEDULED
Status: DISCONTINUED | OUTPATIENT
Start: 2025-04-29 | End: 2025-05-06 | Stop reason: HOSPADM

## 2025-04-29 RX ORDER — HEPARIN SODIUM 5000 [USP'U]/ML
7500 INJECTION, SOLUTION INTRAVENOUS; SUBCUTANEOUS EVERY 12 HOURS SCHEDULED
Status: DISCONTINUED | OUTPATIENT
Start: 2025-04-29 | End: 2025-04-29

## 2025-04-29 RX ORDER — HYDROMORPHONE HCL/PF 1 MG/ML
0.5 SYRINGE (ML) INJECTION
Status: DISCONTINUED | OUTPATIENT
Start: 2025-04-29 | End: 2025-05-02

## 2025-04-29 RX ADMIN — HYDROMORPHONE HYDROCHLORIDE 0.2 MG: 1 INJECTION, SOLUTION INTRAMUSCULAR; INTRAVENOUS; SUBCUTANEOUS at 05:31

## 2025-04-29 RX ADMIN — ATORVASTATIN CALCIUM 80 MG: 80 TABLET, FILM COATED ORAL at 08:24

## 2025-04-29 RX ADMIN — SEVELAMER HYDROCHLORIDE 800 MG: 800 TABLET ORAL at 12:01

## 2025-04-29 RX ADMIN — Medication 1 CAPSULE: at 15:54

## 2025-04-29 RX ADMIN — CHOLECALCIFEROL TAB 25 MCG (1000 UNIT) 2000 UNITS: 25 TAB at 08:24

## 2025-04-29 RX ADMIN — HEPARIN SODIUM 7500 UNITS: 5000 INJECTION INTRAVENOUS; SUBCUTANEOUS at 21:19

## 2025-04-29 RX ADMIN — INSULIN LISPRO 2 UNITS: 100 INJECTION, SOLUTION INTRAVENOUS; SUBCUTANEOUS at 08:33

## 2025-04-29 RX ADMIN — HYDROMORPHONE HYDROCHLORIDE 0.5 MG: 1 INJECTION, SOLUTION INTRAMUSCULAR; INTRAVENOUS; SUBCUTANEOUS at 14:21

## 2025-04-29 RX ADMIN — OXYCODONE HYDROCHLORIDE 5 MG: 5 TABLET ORAL at 20:05

## 2025-04-29 RX ADMIN — LIDOCAINE HYDROCHLORIDE 20 ML: 10 INJECTION, SOLUTION EPIDURAL; INFILTRATION; INTRACAUDAL; PERINEURAL at 12:22

## 2025-04-29 RX ADMIN — HYDROMORPHONE HYDROCHLORIDE 0.5 MG: 1 INJECTION, SOLUTION INTRAMUSCULAR; INTRAVENOUS; SUBCUTANEOUS at 08:32

## 2025-04-29 RX ADMIN — CALCIUM ACETATE 1334 MG: 667 CAPSULE ORAL at 08:29

## 2025-04-29 RX ADMIN — CARVEDILOL 6.25 MG: 6.25 TABLET, FILM COATED ORAL at 08:24

## 2025-04-29 RX ADMIN — OXYCODONE HYDROCHLORIDE 5 MG: 5 TABLET ORAL at 15:54

## 2025-04-29 RX ADMIN — HEPARIN SODIUM 7500 UNITS: 5000 INJECTION INTRAVENOUS; SUBCUTANEOUS at 01:11

## 2025-04-29 RX ADMIN — CALCIUM ACETATE 1334 MG: 667 CAPSULE ORAL at 01:11

## 2025-04-29 RX ADMIN — HEPARIN SODIUM 7500 UNITS: 5000 INJECTION INTRAVENOUS; SUBCUTANEOUS at 14:13

## 2025-04-29 RX ADMIN — SEVELAMER HYDROCHLORIDE 800 MG: 800 TABLET ORAL at 08:24

## 2025-04-29 RX ADMIN — OXYCODONE HYDROCHLORIDE 5 MG: 5 TABLET ORAL at 06:50

## 2025-04-29 RX ADMIN — CALCIUM ACETATE 1334 MG: 667 CAPSULE ORAL at 20:05

## 2025-04-29 RX ADMIN — CALCIUM ACETATE 1334 MG: 667 CAPSULE ORAL at 15:54

## 2025-04-29 RX ADMIN — HEPARIN SODIUM 7500 UNITS: 5000 INJECTION INTRAVENOUS; SUBCUTANEOUS at 05:31

## 2025-04-29 RX ADMIN — NIFEDIPINE 30 MG: 30 TABLET, EXTENDED RELEASE ORAL at 21:20

## 2025-04-29 RX ADMIN — SEVELAMER HYDROCHLORIDE 800 MG: 800 TABLET ORAL at 15:54

## 2025-04-29 RX ADMIN — INSULIN LISPRO 2 UNITS: 100 INJECTION, SOLUTION INTRAVENOUS; SUBCUTANEOUS at 12:04

## 2025-04-29 RX ADMIN — HYDROMORPHONE HYDROCHLORIDE 0.5 MG: 1 INJECTION, SOLUTION INTRAMUSCULAR; INTRAVENOUS; SUBCUTANEOUS at 17:09

## 2025-04-29 RX ADMIN — TORSEMIDE 100 MG: 100 TABLET ORAL at 08:24

## 2025-04-29 RX ADMIN — VANCOMYCIN HYDROCHLORIDE 2000 MG: 1 INJECTION, POWDER, LYOPHILIZED, FOR SOLUTION INTRAVENOUS at 02:11

## 2025-04-29 RX ADMIN — ISOSORBIDE MONONITRATE 60 MG: 60 TABLET, EXTENDED RELEASE ORAL at 08:24

## 2025-04-29 RX ADMIN — HYDROMORPHONE HYDROCHLORIDE 0.5 MG: 1 INJECTION, SOLUTION INTRAMUSCULAR; INTRAVENOUS; SUBCUTANEOUS at 12:01

## 2025-04-29 RX ADMIN — CARVEDILOL 6.25 MG: 6.25 TABLET, FILM COATED ORAL at 15:54

## 2025-04-29 RX ADMIN — HYDROMORPHONE HYDROCHLORIDE 0.2 MG: 1 INJECTION, SOLUTION INTRAMUSCULAR; INTRAVENOUS; SUBCUTANEOUS at 01:23

## 2025-04-29 RX ADMIN — DOCUSATE SODIUM 100 MG: 100 CAPSULE, LIQUID FILLED ORAL at 08:24

## 2025-04-29 RX ADMIN — DOCUSATE SODIUM 100 MG: 100 CAPSULE, LIQUID FILLED ORAL at 17:10

## 2025-04-29 RX ADMIN — OXYCODONE HYDROCHLORIDE 5 MG: 5 TABLET ORAL at 10:16

## 2025-04-29 RX ADMIN — HYDROMORPHONE HYDROCHLORIDE 0.5 MG: 1 INJECTION, SOLUTION INTRAMUSCULAR; INTRAVENOUS; SUBCUTANEOUS at 21:20

## 2025-04-29 RX ADMIN — NIFEDIPINE 30 MG: 30 TABLET, EXTENDED RELEASE ORAL at 01:11

## 2025-04-29 RX ADMIN — ASPIRIN 81 MG: 81 TABLET, CHEWABLE ORAL at 08:24

## 2025-04-29 NOTE — CONSULTS
Consultation - Nephrology   Name: Joaquin Frankel 44 y.o. male I MRN: [id number]  Unit/Bed#: 2 [address] Date of Admission: 4/28/2025   Date of Service: 4/29/2025 I Hospital Day: 0   Inpatient consult to Nephrology  Consult performed by: Johnny Viramontes MD  Consult ordered by: PETRA Ortiz        Physician Requesting Evaluation: Dennis Waterman MD   Reason for Evaluation / Principal Problem: ESRD on HD    Assessment & Plan  ESRD (end stage renal disease) on dialysis ([facility])  Lab Results   Component Value Date    EGFR 6 04/29/2025    EGFR 7 04/28/2025    EGFR 7 01/09/2025    CREATININE 8.67 (H) 04/29/2025    CREATININE 7.91 (H) 04/28/2025    CREATININE 7.99 (H) 01/09/2025   End-stage renal disease on dialysis  -[facility]  -Schedule: Likely Monday Wednesday Thursday and Friday  -Access: Right IJ PermCath.  Previous attempt at fistula not successful  -Plan: Last hemodialysis treatment was yesterday as outpatient, clinically appears euvolemic.  Next dialysis treatment tomorrow.  Recommend fluid restriction  -Outpatient dialysis unit close today, will obtain records tomorrow.  [facility] HD orders placed     Primary hypertension  -Blood pressure stable and is at goal.  Continue current antihypertensive medications-Coreg 6.25 twice daily, nifedipine 30 mg daily, torsemide 100 mg daily  Anemia of chronic disease  -Hemoglobin below target at 9.4 g/dL no JOSSELYN due to past history of PE.  Continue to monitor hemoglobin  Hyperphosphatemia  -Monitor phosphorus level.  Continue binders-PhosLo and sevelamer currently.  Will review [facility]  Hyponatremia  -Due to decreased free water excretion, recommend fluid restriction.  Continue UF with HD  Metabolic acidosis  -Bicarb level is low at 20, will use higher bicarb bath on HD  Cellulitis of groin, right  -Results of CT reviewed, cellulitis right groin as per CT report.  Continue management per primary team currently on IV antibiotics  hx of  Pulmonary embolism (HCC)  -Continue anticoagulation per primary team      I have reviewed the nephrology recommendations including plan for next hemodialysis treatment tomorrow, with Dr. Waterman, and we are in agreement with renal plan including the information outlined above.     History of Present Illness   Joaquin Frankel is a 44 y.o. male with history of ESRD on HD at [facility], IgG kappa monoclonal gammopathy, diabetes mellitus type 2, hypertension, CAD status post stent, morbid obesity who was admitted to [facility] after presenting with complaint of swelling and the pain right groin with concern for abscess for last 3 days prior to admission.  Patient mentions that he was on a fishing trip when he noticed one of the pimple-like abscess on his scrotum popped and had milky drainage, he applied warm compress to help pain and discomfort but complaining of severe pain.  No fever has been compliant with outpatient dialysis and as per inpatient dialysis nurse patient receives dialysis 4 times a week Monday Wednesday Thursday and Friday to help with volume control. A renal consultation is requested today for assistance in the management of ESRD on HD  -.  CT abdomen pelvis with contrast done in the ED was suggestive of extensive subcutaneous inflammatory stranding throughout the right groin and ventral lower pelvic wall and bilateral scrotal sacs right more than the left suggesting diffuse cellulitis/fasciitis or superficial infection.  On IV antibiotic per primary team    Review of Systems   Constitutional:  Negative for activity change, appetite change, chills, diaphoresis, fatigue and fever.   HENT:  Negative for congestion, facial swelling and nosebleeds.    Eyes:  Negative for pain and visual disturbance.   Respiratory:  Negative for cough, chest tightness and shortness of breath.    Cardiovascular:  Negative for chest pain and palpitations.   Gastrointestinal:  Negative for abdominal distention,  "abdominal pain, diarrhea, nausea and vomiting.        Pain in the right groin area along with redness   Genitourinary:  Negative for difficulty urinating, dysuria, flank pain, frequency and hematuria.   Musculoskeletal:  Negative for arthralgias, back pain and joint swelling.   Skin:  Negative for rash.   Neurological:  Negative for dizziness, seizures, syncope, weakness and headaches.   Psychiatric/Behavioral:  Negative for agitation and confusion. The patient is not nervous/anxious.      Historical Information   Past Medical History:   Diagnosis Date    Acute hypoxic respiratory failure ([facility]) 10/07/2023    Arthritis     Breathing difficulty     Cellulitis 10/07/2023    Chronic kidney disease     end stage renal disease    Coronary artery disease     Diabetes mellitus ([facility])     Diabetic neuropathy ([facility]) 05/28/2021    Heart disease     CAD   s/p ptca with 1 stent 2012    Hidradenitis suppurativa     groin    History of transfusion     Hyperlipidemia     Hypertension     MI (myocardial infarction) ([facility])     \" silent \" M.I. in the past    Morbid obesity with BMI of 50.0-59.9, adult ([facility])     Nicotine dependence     Obesity     PE (pulmonary thromboembolism) ([facility]) 06/2024    small on left side, takes coumadin    Pilonidal cyst     Type 1 non-ST elevation myocardial infarction (NSTEMI) ([facility]) 11/29/2020     Past Surgical History:   Procedure Laterality Date    CARDIAC SURGERY      CORONARY ANGIOPLASTY WITH STENT PLACEMENT      x2    INCISION AND DRAINAGE OF WOUND N/A 01/24/2017    Procedure: INCISION AND DRAINAGE (I&D) BUTTOCK, PILONIDAL CYST;  Surgeon: Simba Adhikari MD;  Location: [facility];  Service:     IR BIOPSY BONE MARROW  06/12/2024    IR BIOPSY KIDNEY RANDOM  07/12/2024    IR BIOPSY KIDNEY RANDOM  07/19/2024    IR TEMPORARY DIALYSIS CATHETER PLACEMENT  06/06/2024    IR TUNNELED CENTRAL LINE PLACEMENT  06/14/2024    IR TUNNELED DIALYSIS CATHETER CHECK/CHANGE/REPOSITION/ANGIOPLASTY  1/2/2025    IR TUNNELED DIALYSIS " CATHETER CHECK/CHANGE/REPOSITION/ANGIOPLASTY  2/5/2025    LEG SURGERY Left     I&D of left leg 2012. Had resp failure and had to be intubated during procedure.    WY ARTERIOVENOUS ANASTOMOSIS OPEN DIRECT Left 09/26/2024    Procedure: left arm radio-cephalic AVF creation;  Surgeon: Scout James DO;  Location: [facility];  Service: Vascular    WY ARTERIOVENOUS ANASTOMOSIS OPEN DIRECT Left 12/23/2024    Procedure: left arm brachio-basilic AVF creation;  Surgeon: Scout James DO;  Location: [facility];  Service: Vascular    WY DEBRIDEMENT SUBCUTANEOUS TISSUE 1ST 20 SQ CM/< Left 07/06/2021    Procedure: DEBRIDEMENT WOUND (WASH OUT);  Surgeon: Sudheer Mcfarlane MD;  [facility];  Service: General    WY EXC B9 LESION MRGN XCP SK TG T/A/L >4.0 CM Left 04/06/2021    Procedure: EXCISION THIGH MASS;  Surgeon: Sudheer Mcfarlane MD;  [facility];  Service: General    WY EXCISION H/P/P/U COMPLEX REPAIR Left 07/06/2021    Procedure: EXCISION PERINEAL ABSCESS LEFT THIGH;  Surgeon: Sudheer Mcfarlane MD;  [facility];  Service: General    WY NEGATIVE PRESSURE WOUND THERAPY DME <= 50 SQ CM Left 04/06/2021    Procedure: APPLICATION VAC DRESSING THIGH;  Surgeon: Sudheer Mcfarlane MD;  [facility];  Service: General    WY REVJ OPN ARVEN FSTL W/O THRMBC DIAL GRF Left 3/6/2025    Procedure: left arm basilic fistula elevation;  Surgeon: Scout James DO;  [facility];  Service: Vascular    WOUND DEBRIDEMENT Left 04/17/2021    Procedure: DEBRIDEMENT WOUND AND DRESSING CHANGE (WASH OUT);  Surgeon: Mahin Traore DO;  [facility];  Service: General    WOUND DEBRIDEMENT Left 04/22/2021    Procedure: DEBRIDEMENT LOWER EXTREMITY (WASH OUT), left groin and thigh;  Surgeon: Sudheer Mcfarlane MD;  [facility];  Service: General    WOUND DEBRIDEMENT Left 05/26/2021    Procedure: DEBRIDEMENT LOWER EXTREMITY (WASH OUT);  Surgeon: Zak Castanon DO;  [facility];  Service: General    WOUND DEBRIDEMENT  Left 2021    Procedure: Washout left thigh wound and closure;  Surgeon: Amor Jaramillo DO;  [facility];  Service: General     Social History     Tobacco Use    Smoking status: Former     Current packs/day: 0.00     Average packs/day: 1.5 packs/day for 20.0 years (29.9 ttl pk-yrs)     Types: Cigarettes     Start date: 2021     Quit date: 2021     Years since quittin.1     Passive exposure: Past    Smokeless tobacco: Never   Vaping Use    Vaping status: Never Used   Substance and Sexual Activity    Alcohol use: Not Currently    Drug use: Never    Sexual activity: Not Currently     Partners: Female     E-Cigarette/Vaping    E-Cigarette Use Never User      E-Cigarette/Vaping Substances    Nicotine No     THC No     CBD No     Flavoring No     Other No     Unknown No      Family History   Problem Relation Age of Onset    Heart disease Father     Diabetes Father     Hypertension Mother     Heart disease Mother        Objective :  Temp:  [97.6 °F (36.4 °C)-98.1 °F (36.7 °C)] 97.6 °F (36.4 °C)  HR:  [78-96] 78  BP: (123-141)/(58-81) 129/62  Resp:  [18-20] 20  SpO2:  [92 %-98 %] 97 %  O2 Device: None (Room air)    Current Weight: Weight - Scale: (!) 170 kg (375 lb)  First Weight: Weight - Scale: (!) 171 kg (376 lb)  I/O          0701   0700  0701  / 0700  0701   0700    IV Piggyback  50     Total Intake(mL/kg)  50 (0.3)     Net  +50                  Physical Exam   General:  Ill looking, awake.  Obese  Eyes: Conjunctivae pink,  Sclera anicteric  ENT: lips and mucous membranes moist  Neck: supple   Chest: Clear to Auscultation both lungs,  no crackles, ronchus or wheezing.  CVS: S1 & S2 present, normal rate, regular rhythm, no murmur.  Abdomen: soft, non-tender, non-distended, Bowel sounds normoactive.  Swelling and redness over the right groin area as well as swelling of the scrotum  Extremities: 1+ edema both legs due to lymphedema  Skin: no rash  Neuro: awake,  alert, oriented x 3   Psych: Mood and affect appropriate    Medications:    Current Facility-Administered Medications:     acetaminophen (TYLENOL) tablet 650 mg, 650 mg, Oral, Q6H PRN, PETRA Ortiz    aspirin chewable tablet 81 mg, 81 mg, Oral, Daily, Adriane A Angelvarbarry, CRNP, 81 mg at 04/29/25 0824    atorvastatin (LIPITOR) tablet 80 mg, 80 mg, Oral, Daily, Adriane A Jeff, CRNP, 80 mg at 04/29/25 0824    calcium acetate (PHOSLO) capsule 1,334 mg, 1,334 mg, Oral, TID, Adriane A Jeff, CRNP, 1,334 mg at 04/29/25 0829    carvedilol (COREG) tablet 6.25 mg, 6.25 mg, Oral, BID With Meals, Adriane A Jeff, CRNP, 6.25 mg at 04/29/25 0824    Cholecalciferol (VITAMIN D3) tablet 2,000 Units, 2,000 Units, Oral, Daily, Adriane A TOYA AguilarNP, 2,000 Units at 04/29/25 0824    docusate sodium (COLACE) capsule 100 mg, 100 mg, Oral, BID, Adriane A Jeff CRNP, 100 mg at 04/29/25 0824    heparin (porcine) subcutaneous injection 7,500 Units, 7,500 Units, Subcutaneous, Q8H SHAZIA, Adriane A Jeff CRNP, 7,500 Units at 04/29/25 0531    HYDROmorphone (DILAUDID) injection 0.5 mg, 0.5 mg, Intravenous, Q3H PRN, Dennis Waterman MD, 0.5 mg at 04/29/25 0832    insulin lispro (HumALOG/ADMELOG) 100 units/mL subcutaneous injection 2-12 Units, 2-12 Units, Subcutaneous, TID AC, 2 Units at 04/29/25 0833 **AND** Fingerstick Glucose (POCT), , , TID AC, Adriane A PETRA Aguilar    isosorbide mononitrate (IMDUR) 24 hr tablet 60 mg, 60 mg, Oral, Daily, Adriane Aguilar, CRNP, 60 mg at 04/29/25 0824    naloxone (NARCAN) 0.04 mg/mL syringe 0.04 mg, 0.04 mg, Intravenous, Q1MIN PRN, PETRA Ortiz    NIFEdipine (PROCARDIA XL) 24 hr tablet 30 mg, 30 mg, Oral, HS, Adriane Aguilar, CRNP, 30 mg at 04/29/25 0111    oxyCODONE (ROXICODONE) split tablet 2.5 mg, 2.5 mg, Oral, Q4H PRN **OR** oxyCODONE (ROXICODONE) IR tablet 5 mg, 5 mg, Oral, Q4H PRN, Adriane Aguilar, TOYANP, 5 mg at 04/29/25 0650    sevelamer (RENAGEL)  "tablet 800 mg, 800 mg, Oral, TID With Meals, PETRA Ortiz, 800 mg at 04/29/25 0824    torsemide (DEMADEX) tablet 100 mg, 100 mg, Oral, Daily, PETRA Ortiz, 100 mg at 04/29/25 0824    [START ON 4/30/2025] vancomycin (VANCOCIN) IVPB (premix in dextrose) 750 mg 150 mL, 7.5 mg/kg (Adjusted), Intravenous, Once per day on Monday Wednesday Friday, PETRA Ortiz    vit B complex-vit C-folic acid (Renal Caps) capsule 1 capsule, 1 mg, Oral, Daily With Dinner, PETRA Ortiz      Lab Results: I have reviewed the following results:  Results from last 7 days   Lab Units 04/29/25 0527 04/28/25 2024   WBC Thousand/uL 9.82 10.66*   HEMOGLOBIN g/dL 9.4* 9.3*   HEMATOCRIT % 28.9* 28.0*   PLATELETS Thousands/uL 352 336   POTASSIUM mmol/L 4.2 4.0   CHLORIDE mmol/L 92* 92*   CO2 mmol/L 20* 23   BUN mg/dL 57* 50*   CREATININE mg/dL 8.67* 7.91*   CALCIUM mg/dL 8.9 9.1   ALBUMIN g/dL  --  3.6       Administrative Statements     Portions of the record may have been created with voice recognition software. Occasional wrong word or \"sound a like\" substitutions may have occurred due to the inherent limitations of voice recognition software. Read the chart carefully and recognize, using context, where substitutions have occurred.If you have any questions, please contact the dictating provider.  "

## 2025-04-29 NOTE — PROGRESS NOTES
Joaquin Frankel is a 44 y.o. male who is currently ordered Vancomycin IV with management by the Pharmacy Consult service.  Relevant clinical data and objective / subjective history reviewed.  Vancomycin Assessment:  Indication and Goal AUC/Trough: Soft tissue (goal -600, trough >10), -600, trough >10  Clinical Status:  New start  Micro:   pending  Renal Function:  SCr: 7.91 mg/dL  CrCl: 18.6 mL/min  Renal replacement: HD  Days of Therapy: 1  Current Dose: 2000mg Loading dose  Vancomycin Plan:  New Dosinmg after dialysis on dialysis days Mon, Wed, Fri.  Estimated AUC: N/A mcg*hr/mL  Estimated Trough: 10-15 mcg/mL  Next Level: 25  Renal Function Monitoring: Daily BMP and UOP  Pharmacy will continue to follow closely for s/sx of nephrotoxicity, infusion reactions and appropriateness of therapy.  BMP and CBC will be ordered per protocol. We will continue to follow the patient’s culture results and clinical progress daily.    Cam Ambrose, Pharmacist

## 2025-04-29 NOTE — ASSESSMENT & PLAN NOTE
Lab Results   Component Value Date    HGBA1C 5.8 (H) 10/24/2024   Diabetes mellitus has improved status post end-stage renal disease, pending repeat hemoglobin A1c    -Tight glucose control and weight for pending hemoglobin A1c    Recent Labs     04/29/25  0731 04/29/25  1040   POCGLU 166* 185*       Blood Sugar Average: Last 72 hrs:  (P) 175.5

## 2025-04-29 NOTE — ASSESSMENT & PLAN NOTE
Hx noted  Per patient finished treatment w/ warfarin  Denies shortness of breath or chest pain  Home medication aspirin continued

## 2025-04-29 NOTE — ASSESSMENT & PLAN NOTE
Lab Results   Component Value Date    EGFR 6 04/29/2025    EGFR 7 04/28/2025    EGFR 7 01/09/2025    CREATININE 8.67 (H) 04/29/2025    CREATININE 7.91 (H) 04/28/2025    CREATININE 7.99 (H) 01/09/2025   End-stage renal disease on dialysis  -Outpatient unit: [facility]  -Schedule: Likely Monday Wednesday Thursday and Friday  -Access: Right IJ PermCath.  Previous attempt at fistula not successful  -Plan: Last hemodialysis treatment was yesterday as outpatient, clinically appears euvolemic.  Next dialysis treatment tomorrow.  Recommend fluid restriction  -Outpatient dialysis unit close today, will obtain records tomorrow.  [facility] HD orders placed

## 2025-04-29 NOTE — CONSULTS
Consultation - Surgery-General   Name: Joaquin Frankel 44 y.o. male I MRN: [id number]  Unit/Bed#: [address] [place] [address] Date of Admission: 4/28/2025   Date of Service: 4/29/2025 I Hospital Day: 0   Inpatient consult to Acute Care Surgery  Consult performed by: Marco Morgan PA-C  Consult ordered by: Dennis Waterman MD        Physician Requesting Evaluation: Dennis Waterman MD   Reason for Evaluation / Principal Problem: Right inguinal swelling and cellulitis    Assessment & Plan  Cellulitis of groin, right  AVSS, end-stage renal disease, morbid obesity, diabetes mellitus, vascular disease, smoking history, no leukocytosis, significant tenderness overlying the right inguinal canal down to lateral scrotum, pustule area that is open and draining on the lateral aspect of the scrotum, significant tenderness with light palpation, no discernible fluctuant area but there are couple small areas that are softer area surrounding the induration, CT scan does exhibit extensive inflammatory changes and edema without any air noted in the soft tissue or rim-enhancing collections    Patient did already eat breakfast and lunch    - Plan for diagnostic needle aspiration  -Possible open cutdown or wound exploration tomorrow morning pending operative findings at bedside  - P.o. at midnight  -Sent anaerobic and aerobic cultures  -20 cc lidocaine  -Will reexamine tomorrow for possible procedure  -Continue IV antibiotics  Morbid obesity (HCC)  Could be contributing factor patient currently has BMI 57, does appear the patient has lost some weight  Anemia of chronic disease  Likely due to end-stage renal disease, baseline has some degree of anemia  ESRD (end stage renal disease) on dialysis (HCC)  Lab Results   Component Value Date    EGFR 6 04/29/2025    EGFR 7 04/28/2025    EGFR 7 01/09/2025    CREATININE 8.67 (H) 04/29/2025    CREATININE 7.91 (H) 04/28/2025    CREATININE 7.99 (H) 01/09/2025   Receiving supplemental dialysis 3 times  weekly  Type 2 diabetes mellitus (HCC)  Lab Results   Component Value Date    HGBA1C 5.8 (H) 10/24/2024   Diabetes mellitus has improved status post end-stage renal disease, pending repeat hemoglobin A1c    -Tight glucose control and weight for pending hemoglobin A1c    Recent Labs     04/29/25  0731 04/29/25  1040   POCGLU 166* 185*       Blood Sugar Average: Last 72 hrs:  (P) 175.5    Surgery-General service will follow.    History of Present Illness   Joaquin Frankel is a 44 y.o. male with a past medical history pertinent for end-stage renal disease, CAD, breathing difficulty with acute respiratory failure historically in the past, smoking history historically but since then quit, hidradenitis suppurativa being treated by dermatology in the groin, diabetic neuropathy, diabetes mellitus, morbid obesity, hyperlipidemia, hypertension, history of MI, very large excision of mass/cyst of left groin, wound healing complications of left lower extremity, chronic lower extremity swelling in the left lower extremity presenting to the acute care surgery team due to right inguinal swelling and redness.  Patient has not historically had infection of the groin or cellulitis of the groin in the past.  Patient more recently in the last 3 to 4 days noticed firmness and redness with tenderness to light palpation.  Patient's right groin started having this experience when he was fishing on a boat earlier in the week.  Patient noticed that there was a small pustule that he was able to pop and started draining on the lateral scrotum.  Since then the entirety of his inguinal canal has become progressively more swollen and red.  Patient has firmness/induration.  Patient has not had any fevers or chills.  Patient has not had any sort of other areas that are draining but still has an opening at the lateral aspect of the scrotum.  Patient does not have any changes in detergents or soaps.  Patient has not had any recent shaving of the  area.  Patient does practice hygiene with washing that area in the shower regularly.  Patient denies any other signs or symptoms.  Patient does not urinate regularly but he does note that the swelling has pushed his penis towards the left side but he still has the ability to urinate when he does not frequently.  Patient denies any sort of history of MRSA.  Patient did not have any insect bites or any other sources for infection.  Patient's diabetes is relatively well-controlled secondary to the fact that his end-stage renal disease does not allow him to rid himself of insulin produced by his pancreas stone per his nephrology team and as a result his hemoglobin A1c has been more appropriate lately.  Patient is no longer smoking and has had some weight loss.  Patient does not think he has any other factors contributing to immunocompromised state.      Review of Systems   Constitutional:  Positive for fatigue. Negative for appetite change, chills and fever.   HENT:  Negative for congestion and rhinorrhea.    Respiratory:  Negative for cough, chest tightness, shortness of breath and wheezing.    Cardiovascular:  Negative for chest pain and palpitations.   Gastrointestinal:  Negative for abdominal distention, abdominal pain, diarrhea, nausea and vomiting.   Genitourinary:  Positive for difficulty urinating. Negative for dysuria and hematuria.   Musculoskeletal:  Negative for arthralgias and gait problem.   Skin:  Positive for color change and wound.   Neurological:  Negative for dizziness, weakness and numbness.   Hematological:  Positive for adenopathy.   Psychiatric/Behavioral:  Negative for agitation and confusion.      Medical History Review: I have reviewed the patient's PMH, PSH, Social History, Family History, Meds, and Allergies   Historical Information   Past Medical History:   Diagnosis Date    Acute hypoxic respiratory failure (HCC) 10/07/2023    Arthritis     Breathing difficulty     Cellulitis 10/07/2023     "Chronic kidney disease     end stage renal disease    Coronary artery disease     Diabetes mellitus ([facility])     Diabetic neuropathy ([facility]) 05/28/2021    Heart disease     CAD   s/p ptca with 1 stent 2012    Hidradenitis suppurativa     groin    History of transfusion     Hyperlipidemia     Hypertension     MI (myocardial infarction) ([facility])     \" silent \" M.I. in the past    Morbid obesity with BMI of 50.0-59.9, adult ([facility])     Nicotine dependence     Obesity     PE (pulmonary thromboembolism) ([facility]) 06/2024    small on left side, takes coumadin    Pilonidal cyst     Type 1 non-ST elevation myocardial infarction (NSTEMI) ([facility]) 11/29/2020     Past Surgical History:   Procedure Laterality Date    CARDIAC SURGERY      CORONARY ANGIOPLASTY WITH STENT PLACEMENT      x2    INCISION AND DRAINAGE OF WOUND N/A 01/24/2017    Procedure: INCISION AND DRAINAGE (I&D) BUTTOCK, PILONIDAL CYST;  Surgeon: Simba Adhikari MD;  Location: [facility];  Service:     IR BIOPSY BONE MARROW  06/12/2024    IR BIOPSY KIDNEY RANDOM  07/12/2024    IR BIOPSY KIDNEY RANDOM  07/19/2024    IR TEMPORARY DIALYSIS CATHETER PLACEMENT  06/06/2024    IR TUNNELED CENTRAL LINE PLACEMENT  06/14/2024    IR TUNNELED DIALYSIS CATHETER CHECK/CHANGE/REPOSITION/ANGIOPLASTY  1/2/2025    IR TUNNELED DIALYSIS CATHETER CHECK/CHANGE/REPOSITION/ANGIOPLASTY  2/5/2025    LEG SURGERY Left     I&D of left leg 2012. Had resp failure and had to be intubated during procedure.    AL ARTERIOVENOUS ANASTOMOSIS OPEN DIRECT Left 09/26/2024    Procedure: left arm radio-cephalic AVF creation;  Surgeon: Scout James DO;  Location: [facility] OR;  Service: Vascular    AL ARTERIOVENOUS ANASTOMOSIS OPEN DIRECT Left 12/23/2024    Procedure: left arm brachio-basilic AVF creation;  Surgeon: Scout James DO;  Location: [facility] MAIN OR;  Service: Vascular    AL DEBRIDEMENT SUBCUTANEOUS TISSUE 1ST 20 SQ CM/< Left 07/06/2021    Procedure: DEBRIDEMENT WOUND (WASH OUT);  Surgeon: Sudheer Mcfarlane MD;  Location: [facility]" MAIN OR;  Service: General    IL EXC B9 LESION MRGN XCP SK TG T/A/L >4.0 CM Left 2021    Procedure: EXCISION THIGH MASS;  Surgeon: Sudheer Mcfarlane MD;  Location: [facility];  Service: General    IL EXCISION H/P/P/U COMPLEX REPAIR Left 2021    Procedure: EXCISION PERINEAL ABSCESS LEFT THIGH;  Surgeon: Sudheer Mcfarlane MD;  Location: [facility];  Service: General    IL NEGATIVE PRESSURE WOUND THERAPY DME <= 50 SQ CM Left 2021    Procedure: APPLICATION VAC DRESSING THIGH;  Surgeon: Sudheer Mcfarlane MD;  Location: [facility];  Service: General    IL REVJ OPN ARVEN FSTL W/O THRMBC DIAL GRF Left 3/6/2025    Procedure: left arm basilic fistula elevation;  Surgeon: Scout James DO;  Location: [facility];  Service: Vascular    WOUND DEBRIDEMENT Left 2021    Procedure: DEBRIDEMENT WOUND AND DRESSING CHANGE (WASH OUT);  Surgeon: Mahin Traore DO;  [facility]: [facility];  Service: General    WOUND DEBRIDEMENT Left 2021    Procedure: DEBRIDEMENT LOWER EXTREMITY (WASH OUT), left groin and thigh;  Surgeon: Sudheer Mcfarlane MD;  [facility];  Service: General    WOUND DEBRIDEMENT Left 2021    Procedure: DEBRIDEMENT LOWER EXTREMITY (WASH OUT);  Surgeon: Zak Castanon DO;  [facility]: [facility];  Service: General    WOUND DEBRIDEMENT Left 2021    Procedure: Washout left thigh wound and closure;  Surgeon: Amor Jaramillo DO;  Location: [facility];  Service: General     Social History     Tobacco Use    Smoking status: Former     Current packs/day: 0.00     Average packs/day: 1.5 packs/day for 20.0 years (29.9 ttl pk-yrs)     Types: Cigarettes     Start date: 2021     Quit date: 2021     Years since quittin.1     Passive exposure: Past    Smokeless tobacco: Never   Vaping Use    Vaping status: Never Used   Substance and Sexual Activity    Alcohol use: Not Currently    Drug use: Never    Sexual activity: Not Currently     Partners: Female     E-Cigarette/Vaping    E-Cigarette Use Never  User      E-Cigarette/Vaping Substances    Nicotine No     THC No     CBD No     Flavoring No     Other No     Unknown No      Family history non-contributory  Social History     Tobacco Use    Smoking status: Former     Current packs/day: 0.00     Average packs/day: 1.5 packs/day for 20.0 years (29.9 ttl pk-yrs)     Types: Cigarettes     Start date: 2021     Quit date: 2021     Years since quittin.1     Passive exposure: Past    Smokeless tobacco: Never   Vaping Use    Vaping status: Never Used   Substance and Sexual Activity    Alcohol use: Not Currently    Drug use: Never    Sexual activity: Not Currently     Partners: Female       Current Facility-Administered Medications:     acetaminophen (TYLENOL) tablet 650 mg, Q6H PRN    aspirin chewable tablet 81 mg, Daily    atorvastatin (LIPITOR) tablet 80 mg, Daily    calcium acetate (PHOSLO) capsule 1,334 mg, TID    carvedilol (COREG) tablet 6.25 mg, BID With Meals    Cholecalciferol (VITAMIN D3) tablet 2,000 Units, Daily    docusate sodium (COLACE) capsule 100 mg, BID    heparin (porcine) subcutaneous injection 7,500 Units, Q8H SHAZIA    HYDROmorphone (DILAUDID) injection 0.5 mg, Q3H PRN    insulin lispro (HumALOG/ADMELOG) 100 units/mL subcutaneous injection 2-12 Units, TID AC **AND** Fingerstick Glucose (POCT), TID AC    isosorbide mononitrate (IMDUR) 24 hr tablet 60 mg, Daily    naloxone (NARCAN) 0.04 mg/mL syringe 0.04 mg, Q1MIN PRN    NIFEdipine (PROCARDIA XL) 24 hr tablet 30 mg, HS    oxyCODONE (ROXICODONE) split tablet 2.5 mg, Q4H PRN **OR** oxyCODONE (ROXICODONE) IR tablet 5 mg, Q4H PRN    sevelamer (RENAGEL) tablet 800 mg, TID With Meals    torsemide (DEMADEX) tablet 100 mg, Daily    [START ON 2025] vancomycin (VANCOCIN) IVPB (premix in dextrose) 750 mg 150 mL, Once per day on     vit B complex-vit C-folic acid (Renal Caps) capsule 1 capsule, Daily With Dinner  Prior to Admission Medications   Prescriptions Last Dose  Informant Patient Reported? Taking?   Cholecalciferol (VITAMIN D3) 1,000 units tablet 4/29/2025 Morning Self No Yes   Sig: Take 2 tablets (2,000 Units total) by mouth daily   Diclofenac Sodium (VOLTAREN) 1 % Not Taking Self No No   Sig: Apply 2 g topically 4 (four) times a day   Patient not taking: Reported on 4/29/2025   NIFEdipine (PROCARDIA XL) 30 mg 24 hr tablet 4/28/2025  No Yes   Sig: TAKE ONE TABLET BY MOUTH EVERY DAY AT BEDTIME (GENERIC FOR PROCARDIA XL)   aspirin (ECOTRIN LOW STRENGTH) 81 mg EC tablet 4/28/2025 Self No Yes   Sig: Take 1 tablet (81 mg total) by mouth daily   atorvastatin (LIPITOR) 80 mg tablet 4/28/2025 Self No Yes   Sig: Take 1 tablet (80 mg total) by mouth daily   b complex-vitamin C-folic acid (RENAL) 1 mg 4/28/2025 Self No Yes   Sig: Take 1 capsule by mouth daily with dinner   calcium acetate (PHOSLO) capsule 4/28/2025 Self No Yes   Sig: Take 2 capsules (1,334 mg total) by mouth 3 (three) times a day   carvedilol (COREG) 6.25 mg tablet 4/28/2025 Self No Yes   Sig: Take 1 tablet (6.25 mg total) by mouth 2 (two) times a day with meals   isosorbide mononitrate (IMDUR) 60 mg 24 hr tablet 4/28/2025 Self No Yes   Sig: Take 1 tablet (60 mg total) by mouth daily   metolazone (ZAROXOLYN) 10 mg tablet Past Week Self No Yes   Sig: Take 1 tablet (10 mg total) by mouth 4 (four) times a week   sevelamer (RENAGEL) 800 mg tablet 4/28/2025 Self No Yes   Sig: Take 1 tablet (800 mg total) by mouth 3 (three) times a day with meals   torsemide (DEMADEX) 100 mg tablet 4/28/2025 Self No Yes   Sig: TAKE ONE TABLET BY MOUTH EVERY DAY (GENERIC FOR DEMADEX)   warfarin (COUMADIN) 10 mg tablet Not Taking Self No No   Sig: Take 1.5 tablets (15 mg total) by mouth daily   Patient not taking: Reported on 3/28/2025      Facility-Administered Medications: None     Keflex [cephalexin] and Amoxicillin    Objective :  Temp:  [97.6 °F (36.4 °C)-98.1 °F (36.7 °C)] 97.6 °F (36.4 °C)  HR:  [78-96] 78  BP: (737-141)/(30-35)  129/62  Resp:  [18-20] 20  SpO2:  [92 %-98 %] 97 %  O2 Device: None (Room air)      Physical Exam  Exam conducted with a chaperone present (nursing).   Constitutional:       General: He is awake. He is not in acute distress.     Appearance: Normal appearance. He is morbidly obese. He is not ill-appearing, toxic-appearing or diaphoretic.      Interventions: He is not intubated.  HENT:      Head: Normocephalic and atraumatic.      Right Ear: Hearing and external ear normal.      Left Ear: Hearing and external ear normal.      Nose: Nose normal.   Cardiovascular:      Rate and Rhythm: Normal rate and regular rhythm.      Heart sounds: Normal heart sounds, S1 normal and S2 normal. Heart sounds not distant. No murmur heard.  Pulmonary:      Effort: Pulmonary effort is normal. No tachypnea, bradypnea, accessory muscle usage, prolonged expiration, respiratory distress or retractions. He is not intubated.      Breath sounds: Normal breath sounds. No stridor, decreased air movement or transmitted upper airway sounds. No decreased breath sounds, wheezing, rhonchi or rales.   Abdominal:      General: Abdomen is protuberant. There is no distension.      Palpations: Abdomen is soft.      Tenderness: There is no abdominal tenderness.   Genitourinary:     Penis: Tenderness and swelling present.       Testes:         Right: Tenderness and swelling present.      Epididymis:      Right: Inflamed and enlarged. Tenderness present.      Left: Normal.   Skin:     Findings: Erythema and wound present.          Psychiatric:         Behavior: Behavior is cooperative.         Lab Results: I have reviewed the following results:  Recent Labs     04/28/25 2024 04/28/25 2219 04/29/25  0527   WBC 10.66*  --  9.82   HGB 9.3*  --  9.4*   HCT 28.0*  --  28.9*     --  352   SODIUM 134*  --  132*   K 4.0  --  4.2   CL 92*  --  92*   CO2 23  --  20*   BUN 50*  --  57*   CREATININE 7.91*  --  8.67*   GLUC 169*  --  191*   AST 12*  --   --     ALT 15  --   --    ALB 3.6  --   --    TBILI 0.24  --   --    ALKPHOS 84  --   --    PTT  --  32  --    INR  --  1.08  --    LACTICACID  --  0.7  --        Imaging Results Review: I reviewed radiology reports from this admission including: CT abdomen/pelvis.  Other Study Results Review: No additional pertinent studies reviewed.    VTE Pharmacologic Prophylaxis: Heparin  VTE Mechanical Prophylaxis: sequential compression device    Administrative Statements   I have spent a total time of 95 minutes in caring for this patient on the day of the visit/encounter including Diagnostic results, Prognosis, Risks and benefits of tx options, Instructions for management, Patient and family education, Importance of tx compliance, Risk factor reductions, Impressions, Counseling / Coordination of care, Documenting in the medical record, Reviewing/placing orders in the medical record (including tests, medications, and/or procedures), Obtaining or reviewing history  , and Communicating with other healthcare professionals .

## 2025-04-29 NOTE — ASSESSMENT & PLAN NOTE
Could be contributing factor patient currently has BMI 57, does appear the patient has lost some weight

## 2025-04-29 NOTE — ASSESSMENT & PLAN NOTE
Lab Results   Component Value Date    EGFR 7 04/28/2025    EGFR 7 01/09/2025    EGFR 5 11/21/2024    CREATININE 7.91 (H) 04/28/2025    CREATININE 7.99 (H) 01/09/2025    CREATININE 9.84 (H) 11/21/2024   Follows with nephrology  Per patient dialysis treatments on Monday Wednesday Thursday Friday  Nephrology consulted  Home medication: Phoslo, nephrocaps, torsemide, and zaroxolyn continued  Monitor I&O & daily weight  Renal diet  FR 1800

## 2025-04-29 NOTE — PLAN OF CARE
Problem: PAIN - ADULT  Goal: Verbalizes/displays adequate comfort level or baseline comfort level  Description: Interventions:- Encourage patient to monitor pain and request assistance- Assess pain using appropriate pain scale- Administer analgesics based on type and severity of pain and evaluate response- Implement non-pharmacological measures as appropriate and evaluate response- Consider cultural and social influences on pain and pain management- Notify physician/advanced practitioner if interventions unsuccessful or patient reports new pain  4/29/2025 0158 by Tomasa Rios RN  Outcome: Progressing  4/29/2025 0157 by Tomasa Rios RN  Outcome: Progressing     Problem: INFECTION - ADULT  Goal: Absence or prevention of progression during hospitalization  Description: INTERVENTIONS:- Assess and monitor for signs and symptoms of infection- Monitor lab/diagnostic results- Monitor all insertion sites, i.e. indwelling lines, tubes, and drains- Monitor endotracheal if appropriate and nasal secretions for changes in amount and color- [place] appropriate cooling/warming therapies per order- Administer medications as ordered- Instruct and encourage patient and family to use good hand hygiene technique- Identify and instruct in appropriate isolation precautions for identified infection/condition  4/29/2025 0158 by Tomasa Rios RN  Outcome: Progressing  4/29/2025 0157 by Tomasa Rios RN  Outcome: Progressing  Goal: Absence of fever/infection during neutropenic period  Description: INTERVENTIONS:- Monitor WBC  4/29/2025 0158 by Tomasa Rios RN  Outcome: Progressing  4/29/2025 0157 by Tomasa Rios RN  Outcome: Progressing     Problem: SAFETY ADULT  Goal: Patient will remain free of falls  Description: INTERVENTIONS:- Educate patient/family on patient safety including physical limitations- Instruct patient to call for assistance with activity - Consult OT/PT to assist with  strengthening/mobility - Keep Call bell within reach- Keep bed low and locked with side rails adjusted as appropriate- Keep care items and personal belongings within reach- Initiate and maintain comfort rounds- Make Fall Risk Sign visible to staff- Offer Toileting every 2 Hours, in advance of need- Initiate/Maintain bed alarm- Obtain necessary fall risk management equipment: - Apply yellow socks and bracelet for high fall risk patients- Consider moving patient to room near nurses station  4/29/2025 0158 by Tomasa Rios RN  Outcome: Progressing  4/29/2025 0157 by Tomasa Rios RN  Outcome: Progressing  Goal: Maintain or return to baseline ADL function  Description: INTERVENTIONS:-  Assess patient's ability to carry out ADLs; assess patient's baseline for ADL function and identify physical deficits which impact ability to perform ADLs (bathing, care of mouth/teeth, toileting, grooming, dressing, etc.)- Assess/evaluate cause of self-care deficits - Assess range of motion- Assess patient's mobility; develop plan if impaired- Assess patient's need for assistive devices and provide as appropriate- Encourage maximum independence but intervene and supervise when necessary- Involve family in performance of ADLs- Assess for home care needs following discharge - Consider OT consult to assist with ADL evaluation and planning for discharge- Provide patient education as appropriate  4/29/2025 0158 by Tomasa Rios RN  Outcome: Progressing  4/29/2025 0157 by Tomasa Rios RN  Outcome: Progressing     Problem: DISCHARGE PLANNING  Goal: Discharge to home or other facility with appropriate resources  Description: INTERVENTIONS:- Identify barriers to discharge w/patient and caregiver- Arrange for needed discharge resources and transportation as appropriate- Identify discharge learning needs (meds, wound care, etc.)- Arrange for interpretive services to assist at discharge as needed- Refer to Case  Management Department for coordinating discharge planning if the patient needs post-hospital services based on physician/advanced practitioner order or complex needs related to functional status, cognitive ability, or social support system  4/29/2025 0158 by Tomasa Rios RN  Outcome: Progressing  4/29/2025 0157 by Tomasa Rios RN  Outcome: Progressing     Problem: Knowledge Deficit  Goal: Patient/family/caregiver demonstrates understanding of disease process, treatment plan, medications, and discharge instructions  Description: Complete learning assessment and assess knowledge base.Interventions:- Provide teaching at level of understanding- Provide teaching via preferred learning methods  4/29/2025 0158 by Tomasa Rios RN  Outcome: Progressing  4/29/2025 0157 by Tomasa Rios RN  Outcome: Progressing

## 2025-04-29 NOTE — PLAN OF CARE
Problem: PAIN - ADULT  Goal: Verbalizes/displays adequate comfort level or baseline comfort level  Description: Interventions:- Encourage patient to monitor pain and request assistance- Assess pain using appropriate pain scale- Administer analgesics based on type and severity of pain and evaluate response- Implement non-pharmacological measures as appropriate and evaluate response- Consider cultural and social influences on pain and pain management- Notify physician/advanced practitioner if interventions unsuccessful or patient reports new pain  Outcome: Progressing     Problem: INFECTION - ADULT  Goal: Absence or prevention of progression during hospitalization  Description: INTERVENTIONS:- Assess and monitor for signs and symptoms of infection- Monitor lab/diagnostic results- Monitor all insertion sites, i.e. indwelling lines, tubes, and drains- Administer medications as ordered- Instruct and encourage patient and family to use good hand hygiene technique- Identify and instruct in appropriate isolation precautions for identified infection/condition  Outcome: Progressing  Goal: Absence of fever/infection during neutropenic period  Description: INTERVENTIONS:- Monitor WBC  Outcome: Progressing       Problem: Knowledge Deficit  Goal: Patient/family/caregiver demonstrates understanding of disease process, treatment plan, medications, and discharge instructions  Description: Complete learning assessment and assess knowledge base.Interventions:- Provide teaching at level of understanding- Provide teaching via preferred learning methods  Outcome: Progressing

## 2025-04-29 NOTE — H&P
"H&P - Hospitalist   Name: Joaquin Frankel 44 y.o. male I MRN: [id number]  Unit/Bed#: ED 10 I Date of Admission: 4/28/2025   Date of Service: 4/28/2025 I Hospital Day: 0     Assessment & Plan  Cellulitis of groin, right  POA with 3 to 4 days of swelling, pain and tenderness in right groin.  Pateint states he was on a fishing trip when he noted one of  pimples on right scrotum ruptured and noted large amount milky white drainage.  He used warm compresses at home without relief of swelling or pain.   Afebrile &  no leukocytosis, & LA negative  Procal 0.86   Chart review performed: Wound culture on 3/22/25 of  [facility] wound: + Staphylococcus aureus, resistant to clindamycin, susceptible to Vancomycin treated with bactrim but transitioned to doxycycline & finished course   Ct a/p pelvis with contrast:  \"Extensive subcutaneous inflammatory stranding throughout the right groin, ventral lower pelvic wall, and bilateral scrotal sacs noted, right greater than left. Findings suggest diffuse cellulitis/fasciitis or superficial infection. No subcutaneous air noted to suggest necrotizing fasciitis or Santhosh's gangrene. No loculated drainable rim-enhancing fluid collection/abscess. Multiple mildly prominent bilateral inguinal/groin region lymph nodes are seen, likely reactive in etiology\".  In Ed received dose of clindamycin IV  Right groin area noted tenderness, redness, swollen, and several papule noted  Suspect cellulitis of groin  Initiate vancomycin IV  Pharmacy consulted  Obtain wound culture, f/u  Warm compresses to right groin area  F/U blood cultures x2   Anemia of chronic disease  POA with hgb 9.3, baseline approx 8  Etiology 2/2 chronic kidney disease\  Not signs of active bleed   Monitor H&H  Transfuse for hgb less than 7  hx of Pulmonary embolism (HCC)  Hx noted  Per patient finished treatment w/ warfarin  Denies shortness of breath or chest pain  Home medication aspirin continued  ESRD (end stage renal disease) on " "dialysis ([facility])  Lab Results   Component Value Date    EGFR 7 04/28/2025    EGFR 7 01/09/2025    EGFR 5 11/21/2024    CREATININE 7.91 (H) 04/28/2025    CREATININE 7.99 (H) 01/09/2025    CREATININE 9.84 (H) 11/21/2024   Follows with nephrology  Per patient dialysis treatments on Monday Wednesday Thursday Friday  Nephrology consulted  Home medication: Phoslo, nephrocaps, torsemide, and zaroxolyn continued  Monitor I&O & daily weight  Renal diet  FR 1800  Primary hypertension  Home medications: coreg, procardia, imdur continued  Monitor bp q shift      VTE Pharmacologic Prophylaxis:   High Risk (Score >/= 5) - Pharmacological DVT Prophylaxis Ordered: heparin. Sequential Compression Devices Ordered.  Code Status: Level 1 - Full Code with patient  Discussion with family: Patient declined call to .     Anticipated Length of Stay: Patient will be admitted on an observation basis with an anticipated length of stay of less than 2 midnights secondary to cellulitis of groin requiring Iv antbx and pain management .    History of Present Illness   Chief Complaint: had two pimple like abscess to right groin area 3 days ago \"one ruptured the other did not\" increased pain.     Joaquin Frankel is a 44 y.o. male with a PMH of end-stage renal disease, hypertension, anemia of chronic disease, who presents with presents with abscess like pimples on the right groin area presenting 3 days ago per patient 1 ruptured the other did not and increased pain in that area.  Patient presents to the ED after 3 days of increased swelling tenderness and pain in the right groin area.  He states he was on a fishing trip when he noticed one of the pimple-like abscess on his scrotum popped and had milky drainage.  He has been trying warm compresses to help with pain and discomfort without relief.  He feels that it needs to be drained again.  He states he was recently medicated with antibiotics because of a wound dehiscence on the left " "upper arm.  He states he sees dialysis Monday Wednesday Thursday Friday to help with volume control.  He denies fever or chills.  Noted in the ED CT of the abdomen pelvis negative for abscess, positive for cellulitis.  In ED received IV clindamycin.  Will admit for further medical management of suspected cellulitis.    Review of Systems   Constitutional:  Negative for chills and fever.   HENT:  Negative for ear pain and sore throat.    Eyes:  Negative for pain and visual disturbance.   Respiratory:  Negative for cough and shortness of breath.    Cardiovascular:  Negative for chest pain and palpitations.   Gastrointestinal:  Negative for abdominal pain and vomiting.   Genitourinary:  Positive for scrotal swelling. Negative for dysuria and hematuria.   Musculoskeletal:  Negative for arthralgias and back pain.   Skin:  Positive for color change and wound. Negative for rash.   Neurological:  Negative for seizures and syncope.   All other systems reviewed and are negative.      Historical Information   Past Medical History:   Diagnosis Date    Acute hypoxic respiratory failure ([facility]) 10/07/2023    Arthritis     Breathing difficulty     Cellulitis 10/07/2023    Chronic kidney disease     end stage renal disease    Coronary artery disease     Diabetes mellitus ([facility])     Diabetic neuropathy ([facility]) 05/28/2021    Heart disease     CAD   s/p ptca with 1 stent 2012    Hidradenitis suppurativa     groin    History of transfusion     Hyperlipidemia     Hypertension     MI (myocardial infarction) ([facility])     \" silent \" M.I. in the past    Morbid obesity with BMI of 50.0-59.9, adult ([facility])     Nicotine dependence     Obesity     PE (pulmonary thromboembolism) ([facility]) 06/2024    small on left side, takes coumadin    Pilonidal cyst     Type 1 non-ST elevation myocardial infarction (NSTEMI) ([facility]) 11/29/2020     Past Surgical History:   Procedure Laterality Date    CARDIAC SURGERY      CORONARY ANGIOPLASTY WITH STENT PLACEMENT      x2    " INCISION AND DRAINAGE OF WOUND N/A 01/24/2017    Procedure: INCISION AND DRAINAGE (I&D) BUTTOCK, PILONIDAL CYST;  Surgeon: Simba Adhikari MD;  Location: [facility];  Service:     IR BIOPSY BONE MARROW  06/12/2024    IR BIOPSY KIDNEY RANDOM  07/12/2024    IR BIOPSY KIDNEY RANDOM  07/19/2024    IR TEMPORARY DIALYSIS CATHETER PLACEMENT  06/06/2024    IR TUNNELED CENTRAL LINE PLACEMENT  06/14/2024    IR TUNNELED DIALYSIS CATHETER CHECK/CHANGE/REPOSITION/ANGIOPLASTY  1/2/2025    IR TUNNELED DIALYSIS CATHETER CHECK/CHANGE/REPOSITION/ANGIOPLASTY  2/5/2025    LEG SURGERY Left     I&D of left leg 2012. Had resp failure and had to be intubated during procedure.    FL ARTERIOVENOUS ANASTOMOSIS OPEN DIRECT Left 09/26/2024    Procedure: left arm radio-cephalic AVF creation;  Surgeon: Scout James DO;  Location: [facility];  Service: Vascular    FL ARTERIOVENOUS ANASTOMOSIS OPEN DIRECT Left 12/23/2024    Procedure: left arm brachio-basilic AVF creation;  Surgeon: Scout James DO;  Location: [facility];  Service: Vascular    FL DEBRIDEMENT SUBCUTANEOUS TISSUE 1ST 20 SQ CM/< Left 07/06/2021    Procedure: DEBRIDEMENT WOUND (WASH OUT);  Surgeon: Sudheer Mcfarlane MD;  [facility]: [facility];  Service: General    FL EXC B9 LESION MRGN XCP SK TG T/A/L >4.0 CM Left 04/06/2021    Procedure: EXCISION THIGH MASS;  Surgeon: Sudheer Mcfarlane MD;  [facility];  Service: General    FL EXCISION H/P/P/U COMPLEX REPAIR Left 07/06/2021    Procedure: EXCISION PERINEAL ABSCESS LEFT THIGH;  Surgeon: Sudheer Mcfarlane MD;  [facility];  Service: General    FL NEGATIVE PRESSURE WOUND THERAPY DME <= 50 SQ CM Left 04/06/2021    Procedure: APPLICATION VAC DRESSING THIGH;  Surgeon: Sudheer Mcfarlane MD;  [facility];  Service: General    FL REVJ OPN ARVEN FSTL W/O THRMBC DIAL GRF Left 3/6/2025    Procedure: left arm basilic fistula elevation;  Surgeon: Scout James DO;  [facility];  Service: Vascular    WOUND DEBRIDEMENT Left 04/17/2021     Procedure: DEBRIDEMENT WOUND AND DRESSING CHANGE (WASH OUT);  Surgeon: Mahin Traore DO;  [facility];  Service: General    WOUND DEBRIDEMENT Left 2021    Procedure: DEBRIDEMENT LOWER EXTREMITY (WASH OUT), left groin and thigh;  Surgeon: Sudheer Mcfarlane MD;  [facility];  Service: General    WOUND DEBRIDEMENT Left 2021    Procedure: DEBRIDEMENT LOWER EXTREMITY (WASH OUT);  Surgeon: Zak Castanon DO;  [facility];  Service: General    WOUND DEBRIDEMENT Left 2021    Procedure: Washout left thigh wound and closure;  Surgeon: Amor Jaramillo DO;  [facility];  Service: General     Social History     Tobacco Use    Smoking status: Former     Current packs/day: 0.00     Average packs/day: 1.5 packs/day for 20.0 years (29.9 ttl pk-yrs)     Types: Cigarettes     Start date: 2021     Quit date: 2021     Years since quittin.1     Passive exposure: Past    Smokeless tobacco: Never   Vaping Use    Vaping status: Never Used   Substance and Sexual Activity    Alcohol use: Not Currently    Drug use: Never    Sexual activity: Not Currently     Partners: Female     E-Cigarette/Vaping    E-Cigarette Use Never User      E-Cigarette/Vaping Substances    Nicotine No     THC No     CBD No     Flavoring No     Other No     Unknown No        Social History:  Marital Status: Single   Occupation: disabled   Patient Pre-hospital Living Situation: Home  Patient Pre-hospital Level of Mobility: walks  Patient Pre-hospital Diet Restrictions: none    Meds/Allergies   I have reviewed home medications with patient personally.  Prior to Admission medications    Medication Sig Start Date End Date Taking? Authorizing Provider   aspirin (ECOTRIN LOW STRENGTH) 81 mg EC tablet Take 1 tablet (81 mg total) by mouth daily 24  Yes Gino Fulton MD   atorvastatin (LIPITOR) 80 mg tablet Take 1 tablet (80 mg total) by mouth daily 24  Yes Gino Fulton MD   b complex-vitamin C-folic  acid (RENAL) 1 mg Take 1 capsule by mouth daily with dinner 8/21/24 5/18/25 Yes Zachary Guajardo MD   calcium acetate (PHOSLO) capsule Take 2 capsules (1,334 mg total) by mouth 3 (three) times a day 3/27/25  Yes PETRA Sandoval   carvedilol (COREG) 6.25 mg tablet Take 1 tablet (6.25 mg total) by mouth 2 (two) times a day with meals 10/31/24 4/29/25 Yes Charles Spear MD   Cholecalciferol (VITAMIN D3) 1,000 units tablet Take 2 tablets (2,000 Units total) by mouth daily 8/14/24  Yes Hany Mcfarland DO   isosorbide mononitrate (IMDUR) 60 mg 24 hr tablet Take 1 tablet (60 mg total) by mouth daily 8/29/24  Yes Gino Fulton MD   metolazone (ZAROXOLYN) 10 mg tablet Take 1 tablet (10 mg total) by mouth 4 (four) times a week 10/31/24  Yes Charles Spear MD   NIFEdipine (PROCARDIA XL) 30 mg 24 hr tablet TAKE ONE TABLET BY MOUTH EVERY DAY AT BEDTIME (GENERIC FOR PROCARDIA XL) 4/24/25  Yes Charles Spear MD   sevelamer (RENAGEL) 800 mg tablet Take 1 tablet (800 mg total) by mouth 3 (three) times a day with meals 10/31/24  Yes Charles Spear MD   torsemide (DEMADEX) 100 mg tablet TAKE ONE TABLET BY MOUTH EVERY DAY (GENERIC FOR DEMADEX) 3/24/25  Yes Charles Spear MD   Diclofenac Sodium (VOLTAREN) 1 % Apply 2 g topically 4 (four) times a day 3/3/25   Adi Braun MD   warfarin (COUMADIN) 10 mg tablet Take 1.5 tablets (15 mg total) by mouth daily  Patient not taking: Reported on 3/28/2025 11/12/24   Hany Mcfarland DO     Allergies   Allergen Reactions    Keflex [Cephalexin] Facial Swelling and Lip Swelling    Amoxicillin Hives     childhood       Objective :  Temp:  [98.1 °F (36.7 °C)] 98.1 °F (36.7 °C)  HR:  [83-96] 83  BP: (123-136)/(60-71) 134/60  Resp:  [18-20] 18  SpO2:  [92 %-98 %] 94 %  O2 Device: None (Room air)    Physical Exam  Vitals and nursing note reviewed.   Constitutional:       General: He is not in acute distress.     Appearance: He is well-developed.   HENT:      Head: Normocephalic  and atraumatic.   Eyes:      Conjunctiva/sclera: Conjunctivae normal.   Cardiovascular:      Rate and Rhythm: Normal rate and regular rhythm.      Heart sounds: No murmur heard.  Pulmonary:      Effort: Pulmonary effort is normal. No respiratory distress.      Breath sounds: Normal breath sounds.   Abdominal:      General: Bowel sounds are normal. There is distension.      Palpations: Abdomen is soft.      Tenderness: There is no abdominal tenderness.   Genitourinary:     Pubic Area: Rash present.      Penis: Normal.       Testes:         Right: Tenderness present.       Musculoskeletal:         General: Swelling present.      Cervical back: Neck supple.      Right lower leg: Normal.      Left lower leg: Swelling present.      Comments: Hx left lymphedema , chronic swelling of left leg   Skin:     General: Skin is warm and dry.      Capillary Refill: Capillary refill takes less than 2 seconds.      Findings: Erythema and rash present.          Neurological:      General: No focal deficit present.      Mental Status: He is alert.   Psychiatric:         Mood and Affect: Mood normal.        Lines/Drains:            Lab Results: I have reviewed the following results:  Results from last 7 days   Lab Units 04/28/25 2024   WBC Thousand/uL 10.66*   HEMOGLOBIN g/dL 9.3*   HEMATOCRIT % 28.0*   PLATELETS Thousands/uL 336   LYMPHO PCT % 13*   MONO PCT % 3*   EOS PCT % 4     Results from last 7 days   Lab Units 04/28/25 2024   SODIUM mmol/L 134*   POTASSIUM mmol/L 4.0   CHLORIDE mmol/L 92*   CO2 mmol/L 23   BUN mg/dL 50*   CREATININE mg/dL 7.91*   ANION GAP mmol/L 19*   CALCIUM mg/dL 9.1   ALBUMIN g/dL 3.6   TOTAL BILIRUBIN mg/dL 0.24   ALK PHOS U/L 84   ALT U/L 15   AST U/L 12*   GLUCOSE RANDOM mg/dL 169*     Results from last 7 days   Lab Units 04/28/25  2219   INR  1.08         Lab Results   Component Value Date    HGBA1C 5.8 (H) 10/24/2024    HGBA1C 6.0 (H) 09/11/2024    HGBA1C 5.7 (H) 06/09/2024     Results from last 7  days   Lab Units 04/28/25  1724   LACTIC ACID mmol/L 0.7       Imaging Results Review: I reviewed radiology reports from this admission including: CT abdomen/pelvis.  Other Study Results Review: No additional pertinent studies reviewed.    Administrative Statements       ** Please Note: This note has been constructed using a voice recognition system. **

## 2025-04-29 NOTE — ASSESSMENT & PLAN NOTE
-Monitor phosphorus level.  Continue binders-PhosLo and sevelamer currently.  Will review records from [facility]

## 2025-04-29 NOTE — ASSESSMENT & PLAN NOTE
"POA with 3 to 4 days of swelling, pain and tenderness in right groin.  Pateint states he was on a fishing trip when he noted one of  pimples on right scrotum ruptured and noted large amount milky white drainage.  He used warm compresses at home without relief of swelling or pain.   Afebrile &  no leukocytosis, & LA negative  Procal 0.86   Chart review performed: Wound culture on 3/22/25 of  [facility] wound: + Staphylococcus aureus, resistant to clindamycin, susceptible to Vancomycin treated with bactrim but transitioned to doxycycline & finished course   Ct a/p pelvis with contrast:  \"Extensive subcutaneous inflammatory stranding throughout the right groin, ventral lower pelvic wall, and bilateral scrotal sacs noted, right greater than left. Findings suggest diffuse cellulitis/fasciitis or superficial infection. No subcutaneous air noted to suggest necrotizing fasciitis or Santhosh's gangrene. No loculated drainable rim-enhancing fluid collection/abscess. Multiple mildly prominent bilateral inguinal/groin region lymph nodes are seen, likely reactive in etiology\".  In Ed received dose of clindamycin IV  Remains afebrile, leukocytosis improving.  Remains with right groin area noted tenderness, erythema with induration, no definitive fluctuation.   Continue IV vancomycin  Follow-up vancomycin level as per pharmacy protocol  Surgical evaluation for consideration of I&D , obtain wound culture, f/u  Warm compresses to right groin area  F/U blood cultures x2   Symptomatic treatment  "

## 2025-04-29 NOTE — ASSESSMENT & PLAN NOTE
Lab Results   Component Value Date    HGBA1C 5.8 (H) 10/24/2024       Recent Labs     04/29/25  0731   POCGLU 166*       Blood Sugar Average: Last 72 hrs:  (P) 166  Not currently on medication  Will recheck hemoglobin A1c  Monitor blood glucose

## 2025-04-29 NOTE — ASSESSMENT & PLAN NOTE
-Results of CT reviewed, cellulitis right groin as per CT report.  Continue management per primary team currently on IV antibiotics

## 2025-04-29 NOTE — ASSESSMENT & PLAN NOTE
Lab Results   Component Value Date    EGFR 6 04/29/2025    EGFR 7 04/28/2025    EGFR 7 01/09/2025    CREATININE 8.67 (H) 04/29/2025    CREATININE 7.91 (H) 04/28/2025    CREATININE 7.99 (H) 01/09/2025   On HD on Monday Wednesday Thursday Friday  Nephrology following, input appreciated  Continue HD as per schedule  Continue Home medication: Phoslo, nephrocaps, torsemide  Monitor I&O & daily weight  Follow BMP electrolytes  Renal diet  FR 1800

## 2025-04-29 NOTE — PROCEDURES
"Incision and drain    Date/Time: 4/29/2025 12:12 PM    Performed by: Marco Morgan PA-C  Authorized by: Marco Morgan PA-C  [place] Protocol:  procedure performed by consultantConsent: Verbal consent obtained. Written consent not obtained.  Risks and benefits: risks, benefits and alternatives were discussed  Consent given by: patient  Time out: Immediately prior to procedure a \"time out\" was called to verify the correct patient, procedure, equipment, support staff and site/side marked as required.  Patient understanding: patient states understanding of the procedure being performed  Patient consent: the patient's understanding of the procedure matches consent given  Procedure consent: procedure consent matches procedure scheduled  Relevant documents: relevant documents present and verified  Test results: test results available and properly labeled  Site marked: the operative site was marked  Radiology Images displayed and confirmed. If images not available, report reviewed: imaging studies available  Required items: required blood products, implants, devices, and special equipment available  Patient identity confirmed: verbally with patient and arm band    Patient location:  Bedside  Location:     Type:  Fluid collection    Location:  Anogenital    Anogenital location: lateral scrotum and right inguinal canal.  Pre-procedure details:     Skin preparation:  Betadine  Anesthesia (see MAR for exact dosages):     Anesthesia method:  Local infiltration (20cc)    Local anesthetic:  Lidocaine 1% w/o epi  Procedure details:     Complexity:  Simple    Needle aspiration: yes      Needle size:  18 G    Aspiration type: puncture aspiration      Approach:  Puncture    Incision depth:  Subcutaneous    Drainage:  Purulent and bloody    Drainage amount:  Scant    Packing materials:  None  Post-procedure details:     Patient tolerance of procedure:  Tolerated well, no immediate complications          "

## 2025-04-29 NOTE — ASSESSMENT & PLAN NOTE
Lab Results   Component Value Date    EGFR 6 04/29/2025    EGFR 7 04/28/2025    EGFR 7 01/09/2025    CREATININE 8.67 (H) 04/29/2025    CREATININE 7.91 (H) 04/28/2025    CREATININE 7.99 (H) 01/09/2025   Receiving supplemental dialysis 3 times weekly

## 2025-04-29 NOTE — ASSESSMENT & PLAN NOTE
AVSS, end-stage renal disease, morbid obesity, diabetes mellitus, vascular disease, smoking history, no leukocytosis, significant tenderness overlying the right inguinal canal down to lateral scrotum, pustule area that is open and draining on the lateral aspect of the scrotum, significant tenderness with light palpation, no discernible fluctuant area but there are couple small areas that are softer area surrounding the induration, CT scan does exhibit extensive inflammatory changes and edema without any air noted in the soft tissue or rim-enhancing collections    Patient did already eat breakfast and lunch    - Plan for diagnostic needle aspiration  -Possible open cutdown or wound exploration tomorrow morning pending operative findings at bedside  - P.o. at midnight  -Sent anaerobic and aerobic cultures  -20 cc lidocaine  -Will reexamine tomorrow for possible procedure  -Continue IV antibiotics

## 2025-04-29 NOTE — ASSESSMENT & PLAN NOTE
-Hemoglobin below target at 9.4 g/dL no JOSSELYN due to past history of PE.  Continue to monitor hemoglobin

## 2025-04-29 NOTE — ASSESSMENT & PLAN NOTE
POA with hgb 9.3, baseline approx 8  Etiology 2/2 chronic kidney disease\  Not signs of active bleed   Monitor H&H  Transfuse for hgb less than 7

## 2025-04-29 NOTE — ASSESSMENT & PLAN NOTE
-Blood pressure stable and is at goal.  Continue current antihypertensive medications-Coreg 6.25 twice daily, nifedipine 30 mg daily, torsemide 100 mg daily

## 2025-04-29 NOTE — PROGRESS NOTES
"Progress Note - Hospitalist   Name: Joaquin Frankel 44 y.o. male I MRN: [id number]  Unit/Bed#: 2 [place] [address] I Date of Admission: 4/28/2025   Date of Service: 4/29/2025 I Hospital Day: 0    Assessment & Plan  Cellulitis of groin, right  POA with 3 to 4 days of swelling, pain and tenderness in right groin.  Pateint states he was on a fishing trip when he noted one of  pimples on right scrotum ruptured and noted large amount milky white drainage.  He used warm compresses at home without relief of swelling or pain.   Afebrile &  no leukocytosis, & LA negative  Procal 0.86   Chart review performed: Wound culture on 3/22/25 of  [facility] wound: + Staphylococcus aureus, resistant to clindamycin, susceptible to Vancomycin treated with bactrim but transitioned to doxycycline & finished course   Ct a/p pelvis with contrast:  \"Extensive subcutaneous inflammatory stranding throughout the right groin, ventral lower pelvic wall, and bilateral scrotal sacs noted, right greater than left. Findings suggest diffuse cellulitis/fasciitis or superficial infection. No subcutaneous air noted to suggest necrotizing fasciitis or Santhosh's gangrene. No loculated drainable rim-enhancing fluid collection/abscess. Multiple mildly prominent bilateral inguinal/groin region lymph nodes are seen, likely reactive in etiology\".  In Ed received dose of clindamycin IV  Remains afebrile, leukocytosis improving.  Remains with right groin area noted tenderness, erythema with induration, no definitive fluctuation.   Continue IV vancomycin  Follow-up vancomycin level as per pharmacy protocol  Surgical evaluation for consideration of I&D , obtain wound culture, f/u  Warm compresses to right groin area  F/U blood cultures x2   Symptomatic treatment  Anemia of chronic disease  POA with hgb 9.3, baseline approx 8  Etiology 2/2 chronic kidney disease\  Not signs of active bleed   Monitor H&H  Transfuse for hgb less than 7  hx of Pulmonary embolism (HCC)  Hx " noted  Per patient finished treatment w/ warfarin  Denies shortness of breath or chest pain  Home medication aspirin continued  ESRD (end stage renal disease) on dialysis (HCC)  Lab Results   Component Value Date    EGFR 6 04/29/2025    EGFR 7 04/28/2025    EGFR 7 01/09/2025    CREATININE 8.67 (H) 04/29/2025    CREATININE 7.91 (H) 04/28/2025    CREATININE 7.99 (H) 01/09/2025   On HD on Monday Wednesday Thursday Friday  Nephrology following, input appreciated  Continue HD as per schedule  Continue Home medication: Phoslo, nephrocaps, torsemide  Monitor I&O & daily weight  Follow BMP electrolytes  Renal diet  FR 1800  Primary hypertension  Home medications: coreg, procardia, imdur continued  Monitor bp q shift  Type 2 diabetes mellitus ([facility])  Lab Results   Component Value Date    HGBA1C 5.8 (H) 10/24/2024       Recent Labs     04/29/25  0731   POCGLU 166*       Blood Sugar Average: Last 72 hrs:  (P) 166  Not currently on medication  Will recheck hemoglobin A1c  Monitor blood glucose  Hyponatremia  Sodium level 132, anticipate improvement with dialysis  Hyperphosphatemia  Continue PhosLo, sevelamer  Metabolic acidosis  Bicarb level mildly decreased at 20, monitor  Morbid obesity (HCC)  With BMI of 57.02 kg/m²    VTE Pharmacologic Prophylaxis:   Moderate Risk (Score 3-4) - Pharmacological DVT Prophylaxis Ordered: heparin.    Mobility:   Basic Mobility Inpatient Raw Score: 18  JH-HLM Goal: 6: Walk 10 steps or more  JH-HLM Achieved: 7: Walk 25 feet or more  JH-HLM Goal achieved. Continue to encourage appropriate mobility.    Patient Centered Rounds: I performed bedside rounds with nursing staff today.   Discussions with Specialists or Other Care Team Provider: yes, surgical team, nephrology    Education and Discussions with Family / Patient:  Discussed with the patient.     Current Length of Stay: 0 day(s)  Current Patient Status: Observation   Certification Statement: The patient will continue to require additional  inpatient hospital stay due to extensive cellulitis possible abscess requiring IV antibiotics and surgical evaluation  Discharge Plan: Anticipate discharge in 48-72 hrs to home.    Code Status: Level 1 - Full Code    Subjective   Reports ongoing right groin pain and tenderness partially controlled with pain medication  Denies any fever, chills  Denies any  or GI complaints  HPI reviewed as noted    Objective :  Temp:  [97.6 °F (36.4 °C)-98.1 °F (36.7 °C)] 97.6 °F (36.4 °C)  HR:  [82-96] 82  BP: (123-141)/(58-81) 129/62  Resp:  [18-20] 18  SpO2:  [92 %-98 %] 95 %  O2 Device: None (Room air)    Body mass index is 57.02 kg/m².     Input and Output Summary (last 24 hours):     Intake/Output Summary (Last 24 hours) at 4/29/2025 0811  Last data filed at 4/28/2025 2255  Gross per 24 hour   Intake 50 ml   Output --   Net 50 ml       Physical Exam  Constitutional:       General: He is not in acute distress.     Comments: Morbidly obese   HENT:      Head: Normocephalic and atraumatic.   Cardiovascular:      Rate and Rhythm: Normal rate.      Comments: Dialysis catheter  Pulmonary:      Effort: Pulmonary effort is normal. No respiratory distress.      Breath sounds: No rhonchi.   Abdominal:      General: Bowel sounds are normal. There is no distension.      Palpations: Abdomen is soft.      Tenderness: There is no abdominal tenderness. There is no guarding or rebound.   Genitourinary:     Comments: Well-healed left upper extremity/arm scar  Musculoskeletal:      Comments: Right groin in duration with tenderness no definitive fluctuation with surrounding cellulitis     Lower extremity lymphedema   Skin:     General: Skin is warm and dry.      Findings: No rash.   Neurological:      Mental Status: He is alert. Mental status is at baseline.      Cranial Nerves: No cranial nerve deficit.         Lines/Drains:              Lab Results: I have reviewed the following results:   Results from last 7 days   Lab Units 04/29/25  6762  04/28/25 2024   WBC Thousand/uL 9.82 10.66*   HEMOGLOBIN g/dL 9.4* 9.3*   HEMATOCRIT % 28.9* 28.0*   PLATELETS Thousands/uL 352 336   LYMPHO PCT %  --  13*   MONO PCT %  --  3*   EOS PCT %  --  4     Results from last 7 days   Lab Units 04/29/25  0527 04/28/25 2024   SODIUM mmol/L 132* 134*   POTASSIUM mmol/L 4.2 4.0   CHLORIDE mmol/L 92* 92*   CO2 mmol/L 20* 23   BUN mg/dL 57* 50*   CREATININE mg/dL 8.67* 7.91*   ANION GAP mmol/L 20* 19*   CALCIUM mg/dL 8.9 9.1   ALBUMIN g/dL  --  3.6   TOTAL BILIRUBIN mg/dL  --  0.24   ALK PHOS U/L  --  84   ALT U/L  --  15   AST U/L  --  12*   GLUCOSE RANDOM mg/dL 191* 169*     Results from last 7 days   Lab Units 04/28/25  2219   INR  1.08     Results from last 7 days   Lab Units 04/29/25  0731   POC GLUCOSE mg/dl 166*         Results from last 7 days   Lab Units 04/28/25  2317 04/28/25  2219   LACTIC ACID mmol/L  --  0.7   PROCALCITONIN ng/ml 0.86*  --        Recent Cultures (last 7 days):         Imaging Results Review: I reviewed radiology reports from this admission including: CT abdomen/pelvis.  Other Study Results Review: No additional pertinent studies reviewed.    Last 24 Hours Medication List:     Current Facility-Administered Medications:     acetaminophen (TYLENOL) tablet 650 mg, Q6H PRN    aspirin chewable tablet 81 mg, Daily    atorvastatin (LIPITOR) tablet 80 mg, Daily    calcium acetate (PHOSLO) capsule 1,334 mg, TID    carvedilol (COREG) tablet 6.25 mg, BID With Meals    Cholecalciferol (VITAMIN D3) tablet 2,000 Units, Daily    docusate sodium (COLACE) capsule 100 mg, BID    heparin (porcine) subcutaneous injection 7,500 Units, Q8H SHAZIA    HYDROmorphone (DILAUDID) injection 0.2 mg, Q4H PRN    insulin lispro (HumALOG/ADMELOG) 100 units/mL subcutaneous injection 2-12 Units, TID AC **AND** Fingerstick Glucose (POCT), TID AC    isosorbide mononitrate (IMDUR) 24 hr tablet 60 mg, Daily    naloxone (NARCAN) 0.04 mg/mL syringe 0.04 mg, Q1MIN PRN    NIFEdipine (PROCARDIA  XL) 24 hr tablet 30 mg, HS    oxyCODONE (ROXICODONE) split tablet 2.5 mg, Q4H PRN **OR** oxyCODONE (ROXICODONE) IR tablet 5 mg, Q4H PRN    sevelamer (RENAGEL) tablet 800 mg, TID With Meals    torsemide (DEMADEX) tablet 100 mg, Daily    [START ON 4/30/2025] vancomycin (VANCOCIN) IVPB (premix in dextrose) 750 mg 150 mL, Once per day on Monday Wednesday Friday    vit B complex-vit C-folic acid (Renal Caps) capsule 1 capsule, Daily With Dinner    Administrative Statements   Today, Patient Was Seen By: Dennis Waterman MD  I have spent a total time of 45 minutes in caring for this patient on the day of the visit/encounter including Diagnostic results, Risks and benefits of tx options, Instructions for management, Patient and family education, Importance of tx compliance, Risk factor reductions, Counseling / Coordination of care, Documenting in the medical record, Reviewing/placing orders in the medical record (including tests, medications, and/or procedures), Obtaining or reviewing history  , and Communicating with other healthcare professionals .    **Please Note: This note may have been constructed using a voice recognition system.**

## 2025-04-29 NOTE — ASSESSMENT & PLAN NOTE
"POA with 3 to 4 days of swelling, pain and tenderness in right groin.  Pateint states he was on a fishing trip when he noted one of  pimples on right scrotum ruptured and noted large amount milky white drainage.  He used warm compresses at home without relief of swelling or pain.   Afebrile &  no leukocytosis, & LA negative  Procal 0.86   Chart review performed: Wound culture on 3/22/25 of  [facility] wound: + Staphylococcus aureus, resistant to clindamycin, susceptible to Vancomycin treated with bactrim but transitioned to doxycycline & finished course   Ct a/p pelvis with contrast:  \"Extensive subcutaneous inflammatory stranding throughout the right groin, ventral lower pelvic wall, and bilateral scrotal sacs noted, right greater than left. Findings suggest diffuse cellulitis/fasciitis or superficial infection. No subcutaneous air noted to suggest necrotizing fasciitis or Santhosh's gangrene. No loculated drainable rim-enhancing fluid collection/abscess. Multiple mildly prominent bilateral inguinal/groin region lymph nodes are seen, likely reactive in etiology\".  In Ed received dose of clindamycin IV  Right groin area noted tenderness, redness, swollen, and several papule noted  Suspect cellulitis of groin  Initiate vancomycin IV  Pharmacy consulted  Obtain wound culture, f/u  Warm compresses to right groin area  F/U blood cultures x2   "

## 2025-04-30 ENCOUNTER — ANESTHESIA EVENT (OUTPATIENT)
Dept: PERIOP | Facility: HOSPITAL | Age: 45
DRG: 602 | End: 2025-04-30
Payer: COMMERCIAL

## 2025-04-30 ENCOUNTER — APPOINTMENT (INPATIENT)
Dept: DIALYSIS | Facility: HOSPITAL | Age: 45
DRG: 602 | End: 2025-04-30
Payer: COMMERCIAL

## 2025-04-30 ENCOUNTER — ANESTHESIA (OUTPATIENT)
Dept: PERIOP | Facility: HOSPITAL | Age: 45
DRG: 602 | End: 2025-04-30
Payer: COMMERCIAL

## 2025-04-30 PROBLEM — L02.214 ABSCESS OF RIGHT GROIN: Status: ACTIVE | Noted: 2025-04-28

## 2025-04-30 LAB
ANION GAP SERPL CALCULATED.3IONS-SCNC: 17 MMOL/L (ref 4–13)
BUN SERPL-MCNC: 71 MG/DL (ref 5–25)
CALCIUM SERPL-MCNC: 9.4 MG/DL (ref 8.4–10.2)
CHLORIDE SERPL-SCNC: 94 MMOL/L (ref 96–108)
CO2 SERPL-SCNC: 20 MMOL/L (ref 21–32)
CREAT SERPL-MCNC: 10.38 MG/DL (ref 0.6–1.3)
ERYTHROCYTE [DISTWIDTH] IN BLOOD BY AUTOMATED COUNT: 12.9 % (ref 11.6–15.1)
EST. AVERAGE GLUCOSE BLD GHB EST-MCNC: 160 MG/DL
GFR SERPL CREATININE-BSD FRML MDRD: 5 ML/MIN/1.73SQ M
GLUCOSE SERPL-MCNC: 102 MG/DL (ref 65–140)
GLUCOSE SERPL-MCNC: 117 MG/DL (ref 65–140)
GLUCOSE SERPL-MCNC: 136 MG/DL (ref 65–140)
GLUCOSE SERPL-MCNC: 138 MG/DL (ref 65–140)
GLUCOSE SERPL-MCNC: 90 MG/DL (ref 65–140)
GLUCOSE SERPL-MCNC: 93 MG/DL (ref 65–140)
HBA1C MFR BLD: 7.2 %
HCT VFR BLD AUTO: 27.8 % (ref 36.5–49.3)
HGB BLD-MCNC: 9.1 G/DL (ref 12–17)
MCH RBC QN AUTO: 31.7 PG (ref 26.8–34.3)
MCHC RBC AUTO-ENTMCNC: 32.7 G/DL (ref 31.4–37.4)
MCV RBC AUTO: 97 FL (ref 82–98)
MRSA NOSE QL CULT: NORMAL
PLATELET # BLD AUTO: 361 THOUSANDS/UL (ref 149–390)
PMV BLD AUTO: 9.2 FL (ref 8.9–12.7)
POTASSIUM SERPL-SCNC: 5.2 MMOL/L (ref 3.5–5.3)
RBC # BLD AUTO: 2.87 MILLION/UL (ref 3.88–5.62)
SODIUM SERPL-SCNC: 131 MMOL/L (ref 135–147)
WBC # BLD AUTO: 12.69 THOUSAND/UL (ref 4.31–10.16)

## 2025-04-30 PROCEDURE — 99232 SBSQ HOSP IP/OBS MODERATE 35: CPT

## 2025-04-30 PROCEDURE — 86803 HEPATITIS C AB TEST: CPT | Performed by: INTERNAL MEDICINE

## 2025-04-30 PROCEDURE — 87185 SC STD ENZYME DETCJ PER NZM: CPT | Performed by: SURGERY

## 2025-04-30 PROCEDURE — 88304 TISSUE EXAM BY PATHOLOGIST: CPT | Performed by: PATHOLOGY

## 2025-04-30 PROCEDURE — 0HDAXZZ EXTRACTION OF INGUINAL SKIN, EXTERNAL APPROACH: ICD-10-PCS | Performed by: SURGERY

## 2025-04-30 PROCEDURE — 99232 SBSQ HOSP IP/OBS MODERATE 35: CPT | Performed by: INTERNAL MEDICINE

## 2025-04-30 PROCEDURE — 87340 HEPATITIS B SURFACE AG IA: CPT | Performed by: INTERNAL MEDICINE

## 2025-04-30 PROCEDURE — 87070 CULTURE OTHR SPECIMN AEROBIC: CPT | Performed by: SURGERY

## 2025-04-30 PROCEDURE — 82948 REAGENT STRIP/BLOOD GLUCOSE: CPT

## 2025-04-30 PROCEDURE — 86706 HEP B SURFACE ANTIBODY: CPT | Performed by: INTERNAL MEDICINE

## 2025-04-30 PROCEDURE — 86705 HEP B CORE ANTIBODY IGM: CPT | Performed by: INTERNAL MEDICINE

## 2025-04-30 PROCEDURE — 85027 COMPLETE CBC AUTOMATED: CPT | Performed by: INTERNAL MEDICINE

## 2025-04-30 PROCEDURE — 87147 CULTURE TYPE IMMUNOLOGIC: CPT | Performed by: SURGERY

## 2025-04-30 PROCEDURE — 87075 CULTR BACTERIA EXCEPT BLOOD: CPT | Performed by: SURGERY

## 2025-04-30 PROCEDURE — 83036 HEMOGLOBIN GLYCOSYLATED A1C: CPT | Performed by: INTERNAL MEDICINE

## 2025-04-30 PROCEDURE — 87076 CULTURE ANAEROBE IDENT EACH: CPT | Performed by: SURGERY

## 2025-04-30 PROCEDURE — 86704 HEP B CORE ANTIBODY TOTAL: CPT | Performed by: INTERNAL MEDICINE

## 2025-04-30 PROCEDURE — 80048 BASIC METABOLIC PNL TOTAL CA: CPT | Performed by: INTERNAL MEDICINE

## 2025-04-30 PROCEDURE — 87205 SMEAR GRAM STAIN: CPT | Performed by: SURGERY

## 2025-04-30 RX ORDER — ONDANSETRON 2 MG/ML
4 INJECTION INTRAMUSCULAR; INTRAVENOUS EVERY 6 HOURS PRN
Status: DISCONTINUED | OUTPATIENT
Start: 2025-04-30 | End: 2025-05-06 | Stop reason: HOSPADM

## 2025-04-30 RX ORDER — ONDANSETRON 2 MG/ML
4 INJECTION INTRAMUSCULAR; INTRAVENOUS ONCE AS NEEDED
Status: DISCONTINUED | OUTPATIENT
Start: 2025-04-30 | End: 2025-04-30 | Stop reason: HOSPADM

## 2025-04-30 RX ORDER — FENTANYL CITRATE 50 UG/ML
INJECTION, SOLUTION INTRAMUSCULAR; INTRAVENOUS AS NEEDED
Status: DISCONTINUED | OUTPATIENT
Start: 2025-04-30 | End: 2025-04-30

## 2025-04-30 RX ORDER — CALCITRIOL 0.25 UG/1
0.75 CAPSULE, LIQUID FILLED ORAL 3 TIMES WEEKLY
Status: DISCONTINUED | OUTPATIENT
Start: 2025-04-30 | End: 2025-05-06 | Stop reason: HOSPADM

## 2025-04-30 RX ORDER — FENTANYL CITRATE/PF 50 MCG/ML
50 SYRINGE (ML) INJECTION
Status: DISCONTINUED | OUTPATIENT
Start: 2025-04-30 | End: 2025-04-30 | Stop reason: HOSPADM

## 2025-04-30 RX ORDER — BUPIVACAINE HYDROCHLORIDE AND EPINEPHRINE 2.5; 5 MG/ML; UG/ML
INJECTION, SOLUTION EPIDURAL; INFILTRATION; INTRACAUDAL; PERINEURAL AS NEEDED
Status: DISCONTINUED | OUTPATIENT
Start: 2025-04-30 | End: 2025-04-30 | Stop reason: HOSPADM

## 2025-04-30 RX ORDER — OXYCODONE HYDROCHLORIDE 10 MG/1
10 TABLET ORAL EVERY 4 HOURS PRN
Status: DISCONTINUED | OUTPATIENT
Start: 2025-04-30 | End: 2025-05-06 | Stop reason: HOSPADM

## 2025-04-30 RX ORDER — ONDANSETRON 2 MG/ML
INJECTION INTRAMUSCULAR; INTRAVENOUS AS NEEDED
Status: DISCONTINUED | OUTPATIENT
Start: 2025-04-30 | End: 2025-04-30

## 2025-04-30 RX ORDER — ACETAMINOPHEN 325 MG/1
650 TABLET ORAL EVERY 6 HOURS PRN
Status: DISCONTINUED | OUTPATIENT
Start: 2025-04-30 | End: 2025-05-06 | Stop reason: HOSPADM

## 2025-04-30 RX ORDER — PROPOFOL 10 MG/ML
INJECTION, EMULSION INTRAVENOUS AS NEEDED
Status: DISCONTINUED | OUTPATIENT
Start: 2025-04-30 | End: 2025-04-30

## 2025-04-30 RX ORDER — MIDAZOLAM HYDROCHLORIDE 2 MG/2ML
INJECTION, SOLUTION INTRAMUSCULAR; INTRAVENOUS AS NEEDED
Status: DISCONTINUED | OUTPATIENT
Start: 2025-04-30 | End: 2025-04-30

## 2025-04-30 RX ORDER — LIDOCAINE HYDROCHLORIDE 10 MG/ML
INJECTION, SOLUTION EPIDURAL; INFILTRATION; INTRACAUDAL; PERINEURAL AS NEEDED
Status: DISCONTINUED | OUTPATIENT
Start: 2025-04-30 | End: 2025-04-30

## 2025-04-30 RX ORDER — OXYCODONE HYDROCHLORIDE 5 MG/1
5 TABLET ORAL EVERY 4 HOURS PRN
Status: DISCONTINUED | OUTPATIENT
Start: 2025-04-30 | End: 2025-05-06 | Stop reason: HOSPADM

## 2025-04-30 RX ORDER — SODIUM CHLORIDE 9 MG/ML
INJECTION, SOLUTION INTRAVENOUS CONTINUOUS PRN
Status: DISCONTINUED | OUTPATIENT
Start: 2025-04-30 | End: 2025-04-30

## 2025-04-30 RX ADMIN — VANCOMYCIN HYDROCHLORIDE 750 MG: 750 INJECTION, SOLUTION INTRAVENOUS at 17:04

## 2025-04-30 RX ADMIN — ALTEPLASE 2 MG: 2.2 INJECTION, POWDER, LYOPHILIZED, FOR SOLUTION INTRAVENOUS at 16:31

## 2025-04-30 RX ADMIN — MIDAZOLAM 2 MG: 1 INJECTION INTRAMUSCULAR; INTRAVENOUS at 11:51

## 2025-04-30 RX ADMIN — HYDROMORPHONE HYDROCHLORIDE 0.5 MG: 1 INJECTION, SOLUTION INTRAMUSCULAR; INTRAVENOUS; SUBCUTANEOUS at 08:46

## 2025-04-30 RX ADMIN — CALCITRIOL CAPSULES 0.25 MCG 0.75 MCG: 0.25 CAPSULE ORAL at 14:12

## 2025-04-30 RX ADMIN — PROPOFOL 200 MG: 10 INJECTION, EMULSION INTRAVENOUS at 11:58

## 2025-04-30 RX ADMIN — OXYCODONE HYDROCHLORIDE 5 MG: 5 TABLET ORAL at 06:24

## 2025-04-30 RX ADMIN — HEPARIN SODIUM 7500 UNITS: 5000 INJECTION INTRAVENOUS; SUBCUTANEOUS at 14:12

## 2025-04-30 RX ADMIN — OXYCODONE HYDROCHLORIDE 5 MG: 5 TABLET ORAL at 10:46

## 2025-04-30 RX ADMIN — FENTANYL CITRATE 50 MCG: 50 INJECTION, SOLUTION INTRAMUSCULAR; INTRAVENOUS at 12:01

## 2025-04-30 RX ADMIN — NIFEDIPINE 30 MG: 30 TABLET, EXTENDED RELEASE ORAL at 22:17

## 2025-04-30 RX ADMIN — HYDROMORPHONE HYDROCHLORIDE 0.5 MG: 1 INJECTION, SOLUTION INTRAMUSCULAR; INTRAVENOUS; SUBCUTANEOUS at 14:13

## 2025-04-30 RX ADMIN — OXYCODONE HYDROCHLORIDE 10 MG: 10 TABLET ORAL at 22:17

## 2025-04-30 RX ADMIN — HYDROMORPHONE HYDROCHLORIDE 0.5 MG: 1 INJECTION, SOLUTION INTRAMUSCULAR; INTRAVENOUS; SUBCUTANEOUS at 23:17

## 2025-04-30 RX ADMIN — ALTEPLASE 2 MG: 2.2 INJECTION, POWDER, LYOPHILIZED, FOR SOLUTION INTRAVENOUS at 15:09

## 2025-04-30 RX ADMIN — ACETAMINOPHEN 650 MG: 325 TABLET ORAL at 08:54

## 2025-04-30 RX ADMIN — OXYCODONE HYDROCHLORIDE 10 MG: 10 TABLET ORAL at 17:03

## 2025-04-30 RX ADMIN — OXYCODONE HYDROCHLORIDE 5 MG: 5 TABLET ORAL at 01:07

## 2025-04-30 RX ADMIN — HYDROMORPHONE HYDROCHLORIDE 0.5 MG: 1 INJECTION, SOLUTION INTRAMUSCULAR; INTRAVENOUS; SUBCUTANEOUS at 20:01

## 2025-04-30 RX ADMIN — HEPARIN SODIUM 7500 UNITS: 5000 INJECTION INTRAVENOUS; SUBCUTANEOUS at 22:16

## 2025-04-30 RX ADMIN — DOCUSATE SODIUM 100 MG: 100 CAPSULE, LIQUID FILLED ORAL at 17:04

## 2025-04-30 RX ADMIN — HYDROMORPHONE HYDROCHLORIDE 0.5 MG: 1 INJECTION, SOLUTION INTRAMUSCULAR; INTRAVENOUS; SUBCUTANEOUS at 03:32

## 2025-04-30 RX ADMIN — FENTANYL CITRATE 50 MCG: 50 INJECTION, SOLUTION INTRAMUSCULAR; INTRAVENOUS at 13:23

## 2025-04-30 RX ADMIN — CARVEDILOL 6.25 MG: 6.25 TABLET, FILM COATED ORAL at 17:06

## 2025-04-30 RX ADMIN — ONDANSETRON 4 MG: 2 INJECTION INTRAMUSCULAR; INTRAVENOUS at 12:18

## 2025-04-30 RX ADMIN — FENTANYL CITRATE 50 MCG: 50 INJECTION, SOLUTION INTRAMUSCULAR; INTRAVENOUS at 11:58

## 2025-04-30 RX ADMIN — LIDOCAINE HYDROCHLORIDE 50 MG: 10 INJECTION, SOLUTION EPIDURAL; INFILTRATION; INTRACAUDAL; PERINEURAL at 11:58

## 2025-04-30 RX ADMIN — FENTANYL CITRATE 50 MCG: 50 INJECTION, SOLUTION INTRAMUSCULAR; INTRAVENOUS at 12:59

## 2025-04-30 RX ADMIN — HEPARIN SODIUM 7500 UNITS: 5000 INJECTION INTRAVENOUS; SUBCUTANEOUS at 06:08

## 2025-04-30 RX ADMIN — PROPOFOL 100 MG: 10 INJECTION, EMULSION INTRAVENOUS at 12:04

## 2025-04-30 RX ADMIN — ACETAMINOPHEN 650 MG: 325 TABLET, FILM COATED ORAL at 20:02

## 2025-04-30 RX ADMIN — SODIUM CHLORIDE: 0.9 INJECTION, SOLUTION INTRAVENOUS at 11:52

## 2025-04-30 NOTE — ASSESSMENT & PLAN NOTE
-Results of CT reviewed, cellulitis right groin as per CT report.  Continue management per primary team currently on IV antibiotics.  Was also seen by surgical team and noted plan for n.p.o. for I&D in OR today.

## 2025-04-30 NOTE — OP NOTE
OPERATIVE REPORT  PATIENT NAME: Joaquin Frankel    :  1980  MRN: [id number]  Pt Location: [facility] OR ROOM 02    SURGERY DATE: 2025    Surgeons and Role:     * Stef Mooney MD - Primary     * Tim Humphrey PA-C - Assisting    Preop Diagnosis:  Abscess [L02.91]    Post-Op Diagnosis Codes:     * Abscess [L02.91]    Procedure(s):  Right - INCISION AND DRAINAGE (I&D) GROIN    Specimen(s):  * No specimens in log *    Estimated Blood Loss:   Minimal    Drains:  * No LDAs found *    Anesthesia Type:   General    Operative Indications:  Abscess [L02.91]      Operative Findings:  Large right subcutaneous groin abscess      Complications:   None    Procedure and Technique:  Incision and drainage of large right groin abscess.    Patient taken back to main operating, intubated while supine on the stretcher and the right groin was prepped and draped in normal fashion.    There were 2 areas in the right groin that were fluctuant.  An ellipse of skin at each site was excised utilizing Bovie cautery.  There was a large amount of sterling thick tan pus expressed from each site.  There was a connection in the subcutaneous plane between these 2 sites.  The entire area was copiously irrigated and debrided.  1 inch iodoform packing was placed into each drainage site.  Gauze was then placed over each wound.    The patient awoke from his anesthesia, extubated in the operating room, and sent to the PACU in stable condition.    KATERINE Humphrey was required for technical assistance and retraction.  I was present for all critical portions of the procedure.    Patient Disposition:  PACU              SIGNATURE: Stef Mooney MD  DATE: 2025  TIME: 12:18 PM

## 2025-04-30 NOTE — ASSESSMENT & PLAN NOTE
Patient reports complete treatment with warfarin  Denies chest pain or shortness of breath  Continue home aspirin

## 2025-04-30 NOTE — ANESTHESIA POSTPROCEDURE EVALUATION
Post-Op Assessment Note    CV Status:  Stable  Pain Score: 2    Pain management: adequate       Mental Status:  Alert and awake   Hydration Status:  Euvolemic and stable   PONV Controlled:  Controlled   Airway Patency:  Patent     Post Op Vitals Reviewed: Yes    No anethesia notable event occurred.    Staff: Anesthesiologist           Last Filed PACU Vitals:  Vitals Value Taken Time   Temp 98.1 °F (36.7 °C) 04/30/25 1233   Pulse 79 04/30/25 1315   /65 04/30/25 1315   Resp 18 04/30/25 1315   SpO2 95 % 04/30/25 1315       Modified Zachary:     Vitals Value Taken Time   Activity 2 04/30/25 1245   Respiration 2 04/30/25 1245   Circulation 2 04/30/25 1245   Consciousness 2 04/30/25 1245   Oxygen Saturation 2 04/30/25 1245     Modified Zachary Score: 10

## 2025-04-30 NOTE — PROGRESS NOTES
Joaquin Frankel is a 44 y.o. male who is currently ordered Vancomycin IV with management by the Pharmacy Consult service.  Relevant clinical data and objective / subjective history reviewed.  Vancomycin Assessment:  Indication and Goal AUC/Trough: Soft tissue (goal -600, trough >10), -600, trough >10  Clinical Status: stable  Micro:   pending  Renal Function:  SCr: 10.38 mg/dL  CrCl: 14.1 mL/min  Renal replacement: Not on dialysis  Days of Therapy: 2  Current Dose: 2000mg Loading dose  Vancomycin Plan:  New Dosinmg after dialysis on dialysis days Mon, Wed, Fri.  Estimated Trough: 10-15 mcg/mL  Next Level: 25 @ 0600  Renal Function Monitoring: Daily BMP and UOP  Pharmacy will continue to follow closely for s/sx of nephrotoxicity, infusion reactions and appropriateness of therapy.  BMP and CBC will be ordered per protocol. We will continue to follow the patient’s culture results and clinical progress daily.    Laura Christian, Pharmacist

## 2025-04-30 NOTE — ASSESSMENT & PLAN NOTE
Evidenced by fever and leukocytosis in setting of R groin abscess  Lactate WNL, procal 0.86 on admission  Blood cultures negative at 24 hours  Follow up wound cultures  Trend cbc and fever curve  Continue IV vancomycin

## 2025-04-30 NOTE — PLAN OF CARE
Problem: PAIN - ADULT  Goal: Verbalizes/displays adequate comfort level or baseline comfort level  Description: Interventions:- Encourage patient to monitor pain and request assistance- Assess pain using appropriate pain scale- Administer analgesics based on type and severity of pain and evaluate response- Implement non-pharmacological measures as appropriate and evaluate response- Consider cultural and social influences on pain and pain management- Notify physician/advanced practitioner if interventions unsuccessful or patient reports new pain  Outcome: Progressing     Problem: INFECTION - ADULT  Goal: Absence or prevention of progression during hospitalization  Description: INTERVENTIONS:- Assess and monitor for signs and symptoms of infection- Monitor lab/diagnostic results- Monitor all insertion sites, i.e. indwelling lines, tubes, and drains- Monitor endotracheal if appropriate and nasal secretions for changes in amount and color- [place] appropriate cooling/warming therapies per order- Administer medications as ordered- Instruct and encourage patient and family to use good hand hygiene technique- Identify and instruct in appropriate isolation precautions for identified infection/condition  Outcome: Progressing  Goal: Absence of fever/infection during neutropenic period  Description: INTERVENTIONS:- Monitor WBC  Outcome: Progressing     Problem: SAFETY ADULT  Goal: Patient will remain free of falls  Description: INTERVENTIONS:- Educate patient/family on patient safety including physical limitations- Instruct patient to call for assistance with activity - Consult OT/PT to assist with strengthening/mobility - Keep Call bell within reach- Keep bed low and locked with side rails adjusted as appropriate- Keep care items and personal belongings within reach- Initiate and maintain comfort rounds- Make Fall Risk Sign visible to staff- Offer Toileting every 2 Hours, in advance of need- Initiate/Maintain bed alarm-  Obtain necessary fall risk management equipment: non skid socks- Apply yellow socks and bracelet for high fall risk patients- Consider moving patient to room near nurses station  Outcome: Progressing  Goal: Maintain or return to baseline ADL function  Description: INTERVENTIONS:-  Assess patient's ability to carry out ADLs; assess patient's baseline for ADL function and identify physical deficits which impact ability to perform ADLs (bathing, care of mouth/teeth, toileting, grooming, dressing, etc.)- Assess/evaluate cause of self-care deficits - Assess range of motion- Assess patient's mobility; develop plan if impaired- Assess patient's need for assistive devices and provide as appropriate- Encourage maximum independence but intervene and supervise when necessary- Involve family in performance of ADLs- Assess for home care needs following discharge - Consider OT consult to assist with ADL evaluation and planning for discharge- Provide patient education as appropriate  Outcome: Progressing  Goal: Maintains/Returns to pre admission functional level  Description: INTERVENTIONS:- Perform AM-PAC 6 Click Basic Mobility/ Daily Activity assessment daily.- Set and communicate daily mobility goal to care team and patient/family/caregiver. - Collaborate with rehabilitation services on mobility goals if consulted- Perform Range of Motion 3 times a day.- Reposition patient every 2 hours.- Dangle patient 3 times a day- Stand patient 3 times a day- Ambulate patient 3 times a day- Out of bed to chair 3 times a day - Out of bed for meals 3 times a day- Out of bed for toileting- Record patient progress and toleration of activity level   Outcome: Progressing     Problem: DISCHARGE PLANNING  Goal: Discharge to home or other facility with appropriate resources  Description: INTERVENTIONS:- Identify barriers to discharge w/patient and caregiver- Arrange for needed discharge resources and transportation as appropriate- Identify discharge  learning needs (meds, wound care, etc.)- Arrange for interpretive services to assist at discharge as needed- Refer to Case Management Department for coordinating discharge planning if the patient needs post-hospital services based on physician/advanced practitioner order or complex needs related to functional status, cognitive ability, or social support system  Outcome: Progressing     Problem: Knowledge Deficit  Goal: Patient/family/caregiver demonstrates understanding of disease process, treatment plan, medications, and discharge instructions  Description: Complete learning assessment and assess knowledge base.Interventions:- Provide teaching at level of understanding- Provide teaching via preferred learning methods  Outcome: Progressing

## 2025-04-30 NOTE — PROGRESS NOTES
Patient alert and awake, dressing clean, dry and intact, vital signs within normal limits. Patient transported via stretcher to [address] [place] [address]. Patient ambulated to bed. Bedside report given to CHEYENNE Booth. Bed in lowest position and locked, side rails up, bed alarm on, and call bell within reach.

## 2025-04-30 NOTE — ANESTHESIA POSTPROCEDURE EVALUATION
Post-Op Assessment Note    CV Status:  Stable  Pain Score: 2    Pain management: adequate       Mental Status:  Alert and awake   Hydration Status:  Euvolemic and stable   PONV Controlled:  Controlled   Airway Patency:  Patent     Post Op Vitals Reviewed: Yes    No anethesia notable event occurred.    Staff: Anesthesiologist           Last Filed PACU Vitals:  Vitals Value Taken Time   Temp     Pulse     BP     Resp     SpO2

## 2025-04-30 NOTE — ASSESSMENT & PLAN NOTE
- Sodium level was low at 131 meq/L.  Due to decreased free water excretion, recommend fluid restriction.  Continue UF with HD

## 2025-04-30 NOTE — ANESTHESIA PREPROCEDURE EVALUATION
Procedure:  INCISION AND DRAINAGE (I&D) GROIN (Right: Buttocks)    Relevant Problems   ANESTHESIA (within normal limits)      CARDIO   (+) Coronary artery disease with angina pectoris (HCC)   (+) History of PTCA with stent   (+) MI (myocardial infarction) (HCC)   (+) Other hyperlipidemia   (+) Primary hypertension   (+) hx of Pulmonary embolism (HCC)      ENDO   (+) Type 2 diabetes mellitus (HCC)      /RENAL   (+) Dialysis patient (HCC)   (+) ESRD (end stage renal disease) on dialysis (HCC)      HEMATOLOGY   (+) Anemia of chronic disease      PULMONARY   (+) ANGY (obstructive sleep apnea)      Last dialyzed yesterday    SABINE to LAD in 2020    Able to climb flight of stairs without cardiopulmonary limitation    Cormack I with Araya 4 on 7/19/24. When queried about difficult intubation hx, patient reports emergent intubation during remote procedure for which sedation had been planned, but does not recall being told intubation itself was difficult    Denies recent fever, cough or other symptom of upper respiratory tract infection.      Physical Exam    Airway    Mallampati score: III  TM Distance: >3 FB  Neck ROM: full     Dental   Comment: Some missing denies loose.     Cardiovascular  Rhythm: regular, Rate: normal    Pulmonary   Breath sounds clear to auscultation    Other Findings        Anesthesia Plan  ASA Score- 4 Emergent    Anesthesia Type- general with ASA Monitors.         Additional Monitors:     Airway Plan:            Plan Factors-Exercise tolerance (METS): >4 METS.    Chart reviewed. EKG reviewed.  Existing labs reviewed. Patient summary reviewed.    Patient is not a current smoker.              Induction- intravenous.    Postoperative Plan- Plan for postoperative opioid use.     Perioperative Resuscitation Plan - Level 1 - Full Code.       Informed Consent- Anesthetic plan and risks discussed with patient.  I personally reviewed this patient with the CRNA. Discussed and agreed on the Anesthesia Plan  with the CRNA..

## 2025-04-30 NOTE — PROGRESS NOTES
Progress Note - Nephrology   Name: Joaquin Frankel 44 y.o. male I MRN: [id number]  Unit/Bed#: [address] [address] I Date of Admission: 4/28/2025   Date of Service: 4/30/2025 I Hospital Day: 0    Assessment & Plan  ESRD (end stage renal disease) on dialysis (Formerly [facility])  Lab Results   Component Value Date    EGFR 5 04/30/2025    EGFR 6 04/29/2025    EGFR 7 04/28/2025    CREATININE 10.38 (H) 04/30/2025    CREATININE 8.67 (H) 04/29/2025    CREATININE 7.91 (H) 04/28/2025   End-stage renal disease on dialysis  -Outpatient unit: [facility]  -Schedule: Likely Monday Wednesday Thursday and Friday  -Access: Right IJ PermCath.  Previous attempt at fistula not successful  -Plan:   - Clinically appears euvolemic, plan for hemodialysis treatment today with ultrafiltration.  Recommend using 2K bath as potassium was 5.2 and will use higher bicarb bath of 40 as serum bicarb level is low.  Will plan for repeat hemodialysis treatment again tomorrow per his outpatient schedule.     Primary hypertension  -Blood pressure slightly on the lower side but has been fluctuating.  Continue current antihypertensive medications-Coreg 6.25 twice daily, nifedipine 30 mg daily, torsemide 100 mg daily.  Blood pressure drops can consider holding nifedipine.  Coreg was not listed at [facility] record.  -> As per outpatient [facility] record also on metolazone 10 mg 2 times a week, will discuss with patient if he has been taking it  Anemia of chronic disease  -Hemoglobin below target at 9.1 g/dL no JOSSELYN due to past history of PE.  Continue to monitor hemoglobin as outpatient on Venofer 100 mg weekly.  Also on Mircera 60 mcg every 2 weeks.  Would consider inpatient JOSSELYN if prolonged hospital stay  Hyperphosphatemia  -Monitor phosphorus level.  Continue binders-PhosLo and sevelamer currently.  Will review records from [facility].  Will confirm the dose of PhosLo with patient.  Last phosphorus level was elevated so okay for higher dose of PhosLo  -Secondary hyperparathyroidism  of renal origin, continue calcitriol 0.75 mcg 3 times a week  Hyponatremia  - Sodium level was low at 131 meq/L.  Due to decreased free water excretion, recommend fluid restriction.  Continue UF with HD  Metabolic acidosis  -Bicarb level is low at 20, will use higher bicarb bath on HD  Cellulitis of groin, right  -Results of CT reviewed, cellulitis right groin as per CT report.  Continue management per primary team currently on IV antibiotics.  Was also seen by surgical team and noted plan for n.p.o. for I&D in OR today.  hx of Pulmonary embolism (HCC)  -Continue anticoagulation per primary team  Type 2 diabetes mellitus (HCC)  Lab Results   Component Value Date    HGBA1C 5.8 (H) 10/24/2024       Recent Labs     04/29/25  1040 04/29/25  1552 04/29/25 2035 04/30/25  0711   POCGLU 185* 135 142* 138       Blood Sugar Average: Last 72 hrs:  (P) 153.2  -Continue management per primary team    I have reviewed the nephrology recommendations including plan for hemodialysis treatment today, with primary team, and we are in agreement with renal plan including the information outlined above.     Subjective   Still with significant pain in the right groin but improving.  No nausea vomiting or shortness of breath    Objective :  Temp:  [97.6 °F (36.4 °C)-101.8 °F (38.8 °C)] 101.8 °F (38.8 °C)  HR:  [73-87] 87  BP: (103-141)/(60-69) 103/60  Resp:  [18-20] 20  SpO2:  [93 %-100 %] 95 %  O2 Device: None (Room air)    Current Weight: Weight - Scale: (!) 170 kg (375 lb)  First Weight: Weight - Scale: (!) 171 kg (376 lb)  I/O         04/28 0701 04/29 0700 04/29 0701 04/30 0700 04/30 0701 05/01 0700    IV Piggyback 50      Total Intake(mL/kg) 50 (0.3)      Net +50                   Physical Exam  General:  Ill looking, awake. Obese   Eyes: Conjunctivae pink,  Sclera anicteric  ENT: lips and mucous membranes moist  Neck: supple   Chest: Clear to Auscultation both lungs,  no crackles, ronchus or wheezing.  CVS: S1 & S2 present, normal  rate, regular rhythm, no murmur.  Abdomen: soft, non-tender, non-distended, Bowel sounds normoactive.  Right groin swelling and redness  Extremities: 1+ edema both lower extremities  Skin: no rash  Neuro: awake, alert, oriented x   Psych: Mood and affect appropriate    Medications:    Current Facility-Administered Medications:     acetaminophen (TYLENOL) tablet 650 mg, 650 mg, Oral, Q6H PRN, Adriane A Ujvary, CRNP, 650 mg at 04/30/25 0854    aspirin chewable tablet 81 mg, 81 mg, Oral, Daily, Adriane A Ujvary, CRNP, 81 mg at 04/29/25 0824    atorvastatin (LIPITOR) tablet 80 mg, 80 mg, Oral, Daily, Adriane A Ujvary, CRNP, 80 mg at 04/29/25 0824    calcium acetate (PHOSLO) capsule 1,334 mg, 1,334 mg, Oral, TID, Adriane A Ujvary, CRNP, 1,334 mg at 04/29/25 2005    carvedilol (COREG) tablet 6.25 mg, 6.25 mg, Oral, BID With Meals, Adriane A Ujvary, CRNP, 6.25 mg at 04/29/25 1554    Cholecalciferol (VITAMIN D3) tablet 2,000 Units, 2,000 Units, Oral, Daily, Adriane A Ujvary, CRNP, 2,000 Units at 04/29/25 0824    docusate sodium (COLACE) capsule 100 mg, 100 mg, Oral, BID, Adriane A Ujvary, CRNP, 100 mg at 04/29/25 1710    heparin (porcine) subcutaneous injection 7,500 Units, 7,500 Units, Subcutaneous, Q8H SHAZIA, Adriane A Ujvary, CRNP, 7,500 Units at 04/30/25 0608    HYDROmorphone (DILAUDID) injection 0.5 mg, 0.5 mg, Intravenous, Q3H PRN, Dennis Waterman MD, 0.5 mg at 04/30/25 0846    insulin lispro (HumALOG/ADMELOG) 100 units/mL subcutaneous injection 2-12 Units, 2-12 Units, Subcutaneous, TID AC, 2 Units at 04/29/25 1204 **AND** Fingerstick Glucose (POCT), , , TID AC, PETRA Ortiz    isosorbide mononitrate (IMDUR) 24 hr tablet 60 mg, 60 mg, Oral, Daily, Adriane Aguilar, TOYANP, 60 mg at 04/29/25 0824    naloxone (NARCAN) 0.04 mg/mL syringe 0.04 mg, 0.04 mg, Intravenous, Q1MIN PRN, PETRA Ortiz    NIFEdipine (PROCARDIA XL) 24 hr tablet 30 mg, 30 mg, Oral, HS, Adriane PETRA Rios,  "30 mg at 04/29/25 2120    ondansetron (ZOFRAN) injection 4 mg, 4 mg, Intravenous, Q6H PRN, Laurie Giles PA-C    oxyCODONE (ROXICODONE) split tablet 2.5 mg, 2.5 mg, Oral, Q4H PRN **OR** oxyCODONE (ROXICODONE) IR tablet 5 mg, 5 mg, Oral, Q4H PRN, PETRA Ortiz, 5 mg at 04/30/25 0624    sevelamer (RENAGEL) tablet 800 mg, 800 mg, Oral, TID With Meals, PETRA Ortiz, 800 mg at 04/29/25 1554    torsemide (DEMADEX) tablet 100 mg, 100 mg, Oral, Daily, PETRA Ortiz, 100 mg at 04/29/25 0824    vancomycin (VANCOCIN) IVPB (premix in dextrose) 750 mg 150 mL, 7.5 mg/kg (Adjusted), Intravenous, Once per day on Monday Wednesday Friday, PETRA Ortiz    vit B complex-vit C-folic acid (Renal Caps) capsule 1 capsule, 1 mg, Oral, Daily With Dinner, PETRA Ortiz, 1 capsule at 04/29/25 1554      Lab Results: I have reviewed the following results:  Results from last 7 days   Lab Units 04/30/25  0617 04/29/25  0527 04/28/25 2024   WBC Thousand/uL 12.69* 9.82 10.66*   HEMOGLOBIN g/dL 9.1* 9.4* 9.3*   HEMATOCRIT % 27.8* 28.9* 28.0*   PLATELETS Thousands/uL 361 352 336   POTASSIUM mmol/L 5.2 4.2 4.0   CHLORIDE mmol/L 94* 92* 92*   CO2 mmol/L 20* 20* 23   BUN mg/dL 71* 57* 50*   CREATININE mg/dL 10.38* 8.67* 7.91*   CALCIUM mg/dL 9.4 8.9 9.1   ALBUMIN g/dL  --   --  3.6       Administrative Statements     Portions of the record may have been created with voice recognition software. Occasional wrong word or \"sound a like\" substitutions may have occurred due to the inherent limitations of voice recognition software. Read the chart carefully and recognize, using context, where substitutions have occurred.If you have any questions, please contact the dictating provider.  "

## 2025-04-30 NOTE — ASSESSMENT & PLAN NOTE
-Hemoglobin below target at 9.1 g/dL no JOSSELYN due to past history of PE.  Continue to monitor hemoglobin as outpatient on Venofer 100 mg weekly.  Also on Mircera 60 mcg every 2 weeks.  Would consider inpatient JOSSELYN if prolonged hospital stay

## 2025-04-30 NOTE — ASSESSMENT & PLAN NOTE
-Monitor phosphorus level.  Continue binders-PhosLo and sevelamer currently.  Will review records from [facility].  Will confirm the dose of PhosLo with patient.  Last phosphorus level was elevated so okay for higher dose of PhosLo  -Secondary hyperparathyroidism of renal origin, continue calcitriol 0.75 mcg 3 times a week

## 2025-04-30 NOTE — ASSESSMENT & PLAN NOTE
Lab Results   Component Value Date    EGFR 5 04/30/2025    EGFR 6 04/29/2025    EGFR 7 04/28/2025    CREATININE 10.38 (H) 04/30/2025    CREATININE 8.67 (H) 04/29/2025    CREATININE 7.91 (H) 04/28/2025   On HD on Monday Wednesday Thursday Friday  Nephrology following, input appreciated  Continue HD as per schedule  Continue Home medication: Phoslo, nephrocaps, torsemide  Monitor I&O & daily weight  Follow BMP electrolytes  Renal diet  FR 1800

## 2025-04-30 NOTE — ASSESSMENT & PLAN NOTE
Lab Results   Component Value Date    EGFR 5 04/30/2025    EGFR 6 04/29/2025    EGFR 7 04/28/2025    CREATININE 10.38 (H) 04/30/2025    CREATININE 8.67 (H) 04/29/2025    CREATININE 7.91 (H) 04/28/2025   End-stage renal disease on dialysis  -Outpatient unit: [facility]  -Schedule: Likely Monday Wednesday Thursday and Friday  -Access: Right IJ PermCath.  Previous attempt at fistula not successful  -Plan:   - Clinically appears euvolemic, plan for hemodialysis treatment today with ultrafiltration.  Recommend using 2K bath as potassium was 5.2 and will use higher bicarb bath of 40 as serum bicarb level is low.  Will plan for repeat hemodialysis treatment again tomorrow per his outpatient schedule.

## 2025-04-30 NOTE — PROGRESS NOTES
Progress Note - Hospitalist   Name: Joaquin Frankel 44 y.o. male I MRN: [id number]  Unit/Bed#: [address] [address] I Date of Admission: 4/28/2025   Date of Service: 4/30/2025 I Hospital Day: 0    Assessment & Plan  Abscess of right groin  Patient presented to ED with pain and swelling of right groin/scrotum. Reported pimple on right scrotum that ruptured with large amount of white drainage.   CT a/p showed extensive subcutaneous inflammatory stranding throughout the right groin, ventral lower pelvic wall, and bilateral scrotal sacs suggestive of cellulitis/fasciitis or superficial infection; no findings to suggest necrotizing fasciitis or Santhosh's gangrene; no fluid collection/abscess seen  Lactate negative, procal 0.86 on admission  Patient with fever this morning and mild increase in leukocytosis  General surgery following, s/p I&D in OR today with operative findings of large right groin abscess  Cultures are pending  Continue IV vancomycin  Trend cbc and fever curve  Sepsis (HCC)  Evidenced by fever and leukocytosis in setting of R groin abscess  Lactate WNL, procal 0.86 on admission  Blood cultures negative at 24 hours  Follow up wound cultures  Trend cbc and fever curve  Continue IV vancomycin  ESRD (end stage renal disease) on dialysis (HCC)  Lab Results   Component Value Date    EGFR 5 04/30/2025    EGFR 6 04/29/2025    EGFR 7 04/28/2025    CREATININE 10.38 (H) 04/30/2025    CREATININE 8.67 (H) 04/29/2025    CREATININE 7.91 (H) 04/28/2025   On HD on Monday Wednesday Thursday Friday  Nephrology following, input appreciated  Continue HD as per schedule  Continue Home medication: Phoslo, nephrocaps, torsemide  Monitor I&O & daily weight  Follow BMP electrolytes  Renal diet  FR 1800  Anemia of chronic disease  Hgb appears around baseline  Receives Venofer and Mircera outpatient  No signs or symptoms of bleeding  Daily cbc  hx of Pulmonary embolism (HCC)  Patient reports complete treatment with warfarin  Denies chest  pain or shortness of breath  Continue home aspirin  Primary hypertension  Continue home coreg, nifedipine, torsemide  BP stable  Type 2 diabetes mellitus (HCC)  Lab Results   Component Value Date    HGBA1C 7.2 (H) 04/30/2025       Recent Labs     04/29/25 2035 04/30/25  0711 04/30/25  1054 04/30/25  1416   POCGLU 142* 138 117 102     Blood Sugar Average: Last 72 hrs:  (P) [id number]  Not currently on medication  A1c 7.2  Monitor blood glucose  Hyponatremia  Continue fluid restriction  Management with dialysis  Hyperphosphatemia  Continue PhosLo, sevelamer  Metabolic acidosis  Bicarb level mildly decreased at 20, monitor  Morbid obesity (HCC)  With BMI of 57.02 kg/m²  Patient would benefit from dietary and lifestyle changes    VTE Pharmacologic Prophylaxis:   Moderate Risk (Score 3-4) - Pharmacological DVT Prophylaxis Ordered: heparin.    Mobility:   Basic Mobility Inpatient Raw Score: 22  JH-HLM Goal: 7: Walk 25 feet or more  JH-HLM Achieved: 7: Walk 25 feet or more  JH-HLM Goal achieved. Continue to encourage appropriate mobility.    Patient Centered Rounds: I performed bedside rounds with nursing staff today.   Discussions with Specialists or Other Care Team Provider: nephrology, general surgery    Education and Discussions with Family / Patient: Patient declined call to .     Current Length of Stay: 0 day(s)  Current Patient Status: Inpatient   Certification Statement: The patient will continue to require additional inpatient hospital stay due to R groin abscess, pending cultures, IV abx  Discharge Plan: Anticipate discharge in 48-72 hrs to home.    Code Status: Level 1 - Full Code    Subjective   Patient reports persistent R groin pain, somewhat improved with pain medications. Reports chills overnight which have been going on past few days. Denies any new or worsening symptoms. No chest pain, shortness of breath, nausea, vomiting, or abdominal pain.    Objective :  Temp:  [97.6 °F (36.4  °C)-101.8 °F (38.8 °C)] 97.6 °F (36.4 °C)  HR:  [74-90] 75  BP: (103-159)/(58-70) 127/70  Resp:  [18-20] 18  SpO2:  [93 %-100 %] 100 %  O2 Device: Nasal cannula  Nasal Cannula O2 Flow Rate (L/min):  [2 L/min] 2 L/min    Body mass index is 57.02 kg/m².     Input and Output Summary (last 24 hours):     Intake/Output Summary (Last 24 hours) at 4/30/2025 1513  Last data filed at 4/30/2025 1214  Gross per 24 hour   Intake 300 ml   Output --   Net 300 ml       Physical Exam  Vitals and nursing note reviewed.   Constitutional:       General: He is not in acute distress.     Appearance: He is well-developed. He is obese.   Cardiovascular:      Rate and Rhythm: Normal rate and regular rhythm.   Pulmonary:      Effort: Pulmonary effort is normal. No respiratory distress.      Breath sounds: Normal breath sounds.   Abdominal:      Palpations: Abdomen is soft.      Tenderness: There is no abdominal tenderness.   Musculoskeletal:         General: No swelling.   Skin:     General: Skin is warm and dry.      Capillary Refill: Capillary refill takes less than 2 seconds.   Neurological:      Mental Status: He is alert.   Psychiatric:         Mood and Affect: Mood normal.       Lines/Drains:      Lab Results: I have reviewed the following results:   Results from last 7 days   Lab Units 04/30/25 0617 04/29/25 0527 04/28/25 2024   WBC Thousand/uL 12.69*   < > 10.66*   HEMOGLOBIN g/dL 9.1*   < > 9.3*   HEMATOCRIT % 27.8*   < > 28.0*   PLATELETS Thousands/uL 361   < > 336   LYMPHO PCT %  --   --  13*   MONO PCT %  --   --  3*   EOS PCT %  --   --  4    < > = values in this interval not displayed.     Results from last 7 days   Lab Units 04/30/25 0617 04/29/25 0527 04/28/25 2024   SODIUM mmol/L 131*   < > 134*   POTASSIUM mmol/L 5.2   < > 4.0   CHLORIDE mmol/L 94*   < > 92*   CO2 mmol/L 20*   < > 23   BUN mg/dL 71*   < > 50*   CREATININE mg/dL 10.38*   < > 7.91*   ANION GAP mmol/L 17*   < > 19*   CALCIUM mg/dL 9.4   < > 9.1    ALBUMIN g/dL  --   --  3.6   TOTAL BILIRUBIN mg/dL  --   --  0.24   ALK PHOS U/L  --   --  84   ALT U/L  --   --  15   AST U/L  --   --  12*   GLUCOSE RANDOM mg/dL 136   < > 169*    < > = values in this interval not displayed.     Results from last 7 days   Lab Units 04/28/25  2219   INR  1.08     Results from last 7 days   Lab Units 04/30/25  1416 04/30/25  1054 04/30/25  0711 04/29/25  2035 04/29/25  1552 04/29/25  1040 04/29/25  0731   POC GLUCOSE mg/dl 102 117 138 142* 135 185* 166*     Results from last 7 days   Lab Units 04/30/25  0617   HEMOGLOBIN A1C % 7.2*     Results from last 7 days   Lab Units 04/28/25  2317 04/28/25  2219   LACTIC ACID mmol/L  --  0.7   PROCALCITONIN ng/ml 0.86*  --        Recent Cultures (last 7 days):   Results from last 7 days   Lab Units 04/29/25  1357 04/28/25  2221 04/28/25  2219   BLOOD CULTURE   --  No Growth at 24 hrs. No Growth at 24 hrs.   GRAM STAIN RESULT  No Polys or Bacteria seen  --   --    WOUND CULTURE  No growth  --   --        Imaging Results Review: I reviewed radiology reports from this admission including: CT abdomen/pelvis and procedure reports.  Other Study Results Review: No additional pertinent studies reviewed.    Last 24 Hours Medication List:     Current Facility-Administered Medications:     acetaminophen (TYLENOL) tablet 650 mg, Q6H PRN    aspirin chewable tablet 81 mg, Daily    atorvastatin (LIPITOR) tablet 80 mg, Daily    calcitriol (ROCALTROL) capsule 0.75 mcg, Once per day on Monday Wednesday Friday    calcium acetate (PHOSLO) capsule 1,334 mg, TID    carvedilol (COREG) tablet 6.25 mg, BID With Meals    Cholecalciferol (VITAMIN D3) tablet 2,000 Units, Daily    docusate sodium (COLACE) capsule 100 mg, BID    heparin (porcine) subcutaneous injection 7,500 Units, Q8H SHAZIA    HYDROmorphone (DILAUDID) injection 0.5 mg, Q3H PRN    insulin lispro (HumALOG/ADMELOG) 100 units/mL subcutaneous injection 2-12 Units, TID AC **AND** Fingerstick Glucose (POCT), TID  AC    isosorbide mononitrate (IMDUR) 24 hr tablet 60 mg, Daily    naloxone (NARCAN) 0.04 mg/mL syringe 0.04 mg, Q1MIN PRN    NIFEdipine (PROCARDIA XL) 24 hr tablet 30 mg, HS    ondansetron (ZOFRAN) injection 4 mg, Q6H PRN    oxyCODONE (ROXICODONE) IR tablet 5 mg, Q4H PRN **OR** oxyCODONE (ROXICODONE) immediate release tablet 10 mg, Q4H PRN    sevelamer (RENAGEL) tablet 800 mg, TID With Meals    torsemide (DEMADEX) tablet 100 mg, Daily    vancomycin (VANCOCIN) IVPB (premix in dextrose) 750 mg 150 mL, Once per day on Monday Wednesday Friday    vit B complex-vit C-folic acid (Renal Caps) capsule 1 capsule, Daily With Dinner    Administrative Statements   Today, Patient Was Seen By: Laurie Giles PA-C    **Please Note: This note may have been constructed using a voice recognition system.**

## 2025-04-30 NOTE — ASSESSMENT & PLAN NOTE
Hgb appears around baseline  Receives Venofer and Mircera outpatient  No signs or symptoms of bleeding  Daily cbc

## 2025-04-30 NOTE — PROGRESS NOTES
Progress Note - Surgery-General   Name: Joaquin Frankel 44 y.o. male I MRN: [id number]  Unit/Bed#: [address] [place] [address] Date of Admission: 4/28/2025   Date of Service: 4/30/2025 I Hospital Day: 0    Assessment & Plan  Cellulitis of groin, right  AVSS, end-stage renal disease, morbid obesity, diabetes mellitus, vascular disease, smoking history, no leukocytosis, significant erythema and tenderness overlying the right inguinal canal down to lateral scrotum, CT scan two days ago noted extensive inflammatory changes and edema without any air noted in the soft tissue or rim-enhancing collections but patient has now developed areas of fluctuance that have expressed purulence with bedside needle aspiration yesterday   Patient febrile Tmax 101.8 and increasing WBC count 9.8 > 12.7    - NPO for I&D in the OR today  -dialysis scheduled for this afternoon  -vancomycin day 2   -Anaerobic and aerobic cultures pending    Morbid obesity (HCC)    Anemia of chronic disease  Likely due to end-stage renal disease  ESRD (end stage renal disease) on dialysis ([facility])  Lab Results   Component Value Date    EGFR 5 04/30/2025    EGFR 6 04/29/2025    EGFR 7 04/28/2025    CREATININE 10.38 (H) 04/30/2025    CREATININE 8.67 (H) 04/29/2025    CREATININE 7.91 (H) 04/28/2025   Receiving supplemental dialysis 3-4 times weekly  Type 2 diabetes mellitus ([facility])  Lab Results   Component Value Date    HGBA1C 5.8 (H) 10/24/2024   Diabetes mellitus has improved status post end-stage renal disease, pending repeat hemoglobin A1c    -Tight glucose control   - pending hemoglobin A1c    Recent Labs     04/29/25  1040 04/29/25  1552 04/29/25  2035 04/30/25  0711   POCGLU 185* 135 142* 138       Blood Sugar Average: Last 72 hrs:  (P) 153.2          Subjective   Patient reports his tenderness and discomfort is still present. He was experiencing fevers and chills overnight     Objective :  Temp:  [97.6 °F (36.4 °C)-101.8 °F (38.8 °C)] 101.8 °F (38.8 °C)  HR:  [73-87]  87  BP: (103-141)/(60-69) 103/60  Resp:  [18-20] 20  SpO2:  [93 %-100 %] 95 %  O2 Device: None (Room air)    I/O         04/28 0701 04/29 0700 04/29 0701 04/30 0700 04/30 0701 05/01 0700    IV Piggyback 50      Total Intake(mL/kg) 50 (0.3)      Net +50                     Physical Exam  Vitals reviewed.   Constitutional:       General: He is awake.      Appearance: He is morbidly obese. He is ill-appearing.   HENT:      Head: Normocephalic and atraumatic.   Neck:      Comments: Impaired ROM  Cardiovascular:      Rate and Rhythm: Normal rate and regular rhythm.      Arteriovenous access: Left arteriovenous access is present.  Pulmonary:      Effort: Pulmonary effort is normal. No tachypnea, bradypnea or respiratory distress.   Abdominal:      General: Abdomen is protuberant.      Palpations: Abdomen is soft.      Tenderness: There is no abdominal tenderness.   Skin:     Coloration: Skin is not cyanotic or mottled.      Findings: Abscess, erythema and wound present.      Comments: areas of fluctuance that have expressed purulence in right groin   Neurological:      Mental Status: He is alert.      GCS: GCS eye subscore is 4. GCS verbal subscore is 5. GCS motor subscore is 6.      Cranial Nerves: No dysarthria or facial asymmetry.   Psychiatric:         Attention and Perception: Attention normal.         Speech: Speech normal.         Behavior: Behavior normal. Behavior is cooperative.           Lab Results: I have reviewed the following results:  Recent Labs     04/28/25 2024 04/28/25  2219 04/29/25  0527 04/30/25  0617   WBC 10.66*  --    < > 12.69*   HGB 9.3*  --    < > 9.1*   HCT 28.0*  --    < > 27.8*     --    < > 361   SODIUM 134*  --    < > 131*   K 4.0  --    < > 5.2   CL 92*  --    < > 94*   CO2 23  --    < > 20*   BUN 50*  --    < > 71*   CREATININE 7.91*  --    < > 10.38*   GLUC 169*  --    < > 136   AST 12*  --   --   --    ALT 15  --   --   --    ALB 3.6  --   --   --    TBILI 0.24  --   --    --    ALKPHOS 84  --   --   --    PTT  --  32  --   --    INR  --  1.08  --   --    LACTICACID  --  0.7  --   --     < > = values in this interval not displayed.       Imaging Results Review: I reviewed radiology reports from this admission including: CT abdomen/pelvis.  Other Study Results Review: No additional pertinent studies reviewed.    VTE Pharmacologic Prophylaxis: VTE covered by:  heparin (porcine), Subcutaneous, 7,500 Units at 04/30/25 0608     VTE Mechanical Prophylaxis: sequential compression device

## 2025-04-30 NOTE — ASSESSMENT & PLAN NOTE
-Blood pressure slightly on the lower side but has been fluctuating.  Continue current antihypertensive medications-Coreg 6.25 twice daily, nifedipine 30 mg daily, torsemide 100 mg daily.  Blood pressure drops can consider holding nifedipine.  Coreg was not listed at [facility] record.  -> As per outpatient [facility] record also on metolazone 10 mg 2 times a week, will discuss with patient if he has been taking it

## 2025-04-30 NOTE — ASSESSMENT & PLAN NOTE
Lab Results   Component Value Date    HGBA1C 5.8 (H) 10/24/2024       Recent Labs     04/29/25  1040 04/29/25  1552 04/29/25 2035 04/30/25  0711   POCGLU 185* 135 142* 138       Blood Sugar Average: Last 72 hrs:  (P) 153.2  -Continue management per primary team

## 2025-04-30 NOTE — HEMODIALYSIS
Post-Dialysis RN Treatment Note    Blood Pressure:  Pre 118/60 mm/Hg  Post 128/70 mmHg   EDW:  167.5 kg    Weight:  Pre 172.3 kg   Post 172.5 kg   Mode of weight measurement: Bed Scale   Volume Removed:  0 ml    Treatment duration: 30 minutes    NS given:  No    Treatment shortened Yes, describe: Catheter not  functioning.   Medications given during Rx: CathFlo    Estimated Kt/V:  None Reported   Access type: Permacath/TDC   Needle Gauge:  N/A   Access Issues: Yes, describe: Lines reversed due to sluggish blood return, unable to keep treatment going 30 minutes in due to elevated arterial pressures. Cath Luís placed in both lines and dwelled for 30 minutes. Attempted treatment again and arterial pressures to high to continue. Cath Luís placed to dwell over night.     Report called to primary nurse:   Yes Eunice SEO RN

## 2025-04-30 NOTE — PLAN OF CARE
Problem: PAIN - ADULT  Goal: Verbalizes/displays adequate comfort level or baseline comfort level  Description: Interventions:- Encourage patient to monitor pain and request assistance- Assess pain using appropriate pain scale- Administer analgesics based on type and severity of pain and evaluate response- Implement non-pharmacological measures as appropriate and evaluate response- Consider cultural and social influences on pain and pain management- Notify physician/advanced practitioner if interventions unsuccessful or patient reports new pain  Outcome: Progressing     Problem: INFECTION - ADULT  Goal: Absence or prevention of progression during hospitalization  Description: INTERVENTIONS:- Assess and monitor for signs and symptoms of infection- Monitor lab/diagnostic results- Monitor all insertion sites, i.e. indwelling lines, tubes, and drains- Monitor endotracheal if appropriate and nasal secretions for changes in amount and color- [place] appropriate cooling/warming therapies per order- Administer medications as ordered- Instruct and encourage patient and family to use good hand hygiene technique- Identify and instruct in appropriate isolation precautions for identified infection/condition  Outcome: Progressing  Goal: Absence of fever/infection during neutropenic period  Description: INTERVENTIONS:- Monitor WBC  Outcome: Progressing     Problem: Knowledge Deficit  Goal: Patient/family/caregiver demonstrates understanding of disease process, treatment plan, medications, and discharge instructions  Description: Complete learning assessment and assess knowledge base.Interventions:- Provide teaching at level of understanding- Provide teaching via preferred learning methods  Outcome: Progressing

## 2025-04-30 NOTE — ASSESSMENT & PLAN NOTE
Lab Results   Component Value Date    HGBA1C 7.2 (H) 04/30/2025       Recent Labs     04/29/25  2035 04/30/25  0711 04/30/25  1054 04/30/25  1416   POCGLU 142* 138 117 102     Blood Sugar Average: Last 72 hrs:  (P) [id number]  Not currently on medication  A1c 7.2  Monitor blood glucose

## 2025-04-30 NOTE — ASSESSMENT & PLAN NOTE
Lab Results   Component Value Date    HGBA1C 5.8 (H) 10/24/2024   Diabetes mellitus has improved status post end-stage renal disease, pending repeat hemoglobin A1c    -Tight glucose control   - pending hemoglobin A1c    Recent Labs     04/29/25  1040 04/29/25  1552 04/29/25 2035 04/30/25  0711   POCGLU 185* 135 142* 138       Blood Sugar Average: Last 72 hrs:  (P) 153.2

## 2025-04-30 NOTE — ASSESSMENT & PLAN NOTE
Lab Results   Component Value Date    EGFR 5 04/30/2025    EGFR 6 04/29/2025    EGFR 7 04/28/2025    CREATININE 10.38 (H) 04/30/2025    CREATININE 8.67 (H) 04/29/2025    CREATININE 7.91 (H) 04/28/2025   Receiving supplemental dialysis 3-4 times weekly

## 2025-04-30 NOTE — ASSESSMENT & PLAN NOTE
Patient presented to ED with pain and swelling of right groin/scrotum. Reported pimple on right scrotum that ruptured with large amount of white drainage.   CT a/p showed extensive subcutaneous inflammatory stranding throughout the right groin, ventral lower pelvic wall, and bilateral scrotal sacs suggestive of cellulitis/fasciitis or superficial infection; no findings to suggest necrotizing fasciitis or Santhosh's gangrene; no fluid collection/abscess seen  Lactate negative, procal 0.86 on admission  Patient with fever this morning and mild increase in leukocytosis  General surgery following, s/p I&D in OR today with operative findings of large right groin abscess  Cultures are pending  Continue IV vancomycin  Trend cbc and fever curve

## 2025-04-30 NOTE — PLAN OF CARE
Pt. On HD for 3 hours with a UF goal of 3-4 L as tolerated. Pt. On a 2 k 2.5 rodri bath for a potassium of 5.2 on 04/30/25.  Problem: METABOLIC, FLUID AND ELECTROLYTES - ADULT  Goal: Electrolytes maintained within normal limits  Description: INTERVENTIONS:- Monitor labs and assess patient for signs and symptoms of electrolyte imbalances- Administer electrolyte replacement as ordered- Monitor response to electrolyte replacements, including repeat lab results as appropriate- Instruct patient on fluid and nutrition as appropriate  Outcome: Progressing  Goal: Fluid balance maintained  Description: INTERVENTIONS:- Monitor labs - Monitor I/O and WT- Instruct patient on fluid and nutrition as appropriate- Assess for signs & symptoms of volume excess or deficit  Outcome: Progressing

## 2025-04-30 NOTE — UTILIZATION REVIEW
"Initial Clinical Review    Observation 4/28/25 @ 2322 converted to inpatient admission 4/30/25 @ 1152 for continued care & tx for cellulitis R groin.    Admission: Date/Time/Statement:   Admission Orders (From admission, onward)       Ordered        04/30/25 1152  INPATIENT ADMISSION  Once            04/28/25 2322  Place in Observation  Once                          Orders Placed This Encounter   Procedures    INPATIENT ADMISSION     Standing Status:   Standing     Number of Occurrences:   1     Level of Care:   Med Surg [16]     Estimated length of stay:   More than 2 Midnights     Certification:   I certify that inpatient services are medically necessary for this patient for a duration of greater than two midnights. See H&P and MD Progress Notes for additional information about the patient's course of treatment.     ED Arrival Information       Expected   -    Arrival   4/28/2025 19:22    Acuity   Less Urgent              Means of arrival   Walk-In    Escorted by   Self    Service   Hospitalist    Admission type   Emergency              Arrival complaint   Skin Infection             Chief Complaint   Patient presents with    Abscess     Pt reports had two pimple like abscess to right groin area 3 days ago \"one ruptured the other did not\" increased pain. No fevers. Requesting I+D which he has had in past. Pt on dialysis MWF       Initial Presentation:   44 yom to ER from home for 3 to 4 days of swelling, pain and tenderness in right groin.  Pateint states he was on a fishing trip when he noted one of  pimples on right scrotum ruptured and noted large amount milky white drainage.  He used warm compresses at home without relief of swelling or pain. Hx end-stage renal disease, hypertension, anemia of chronic disease. Presents with abd distended, scrotal swelling, rash & tenderness. Admission CT a/p: Extensive subcutaneous inflammatory stranding throughout the right groin, ventral lower pelvic wall, and bilateral " scrotal sacs noted, right greater than left. Findings suggest diffuse cellulitis/fasciitis or superficial infection. No subcutaneous air noted to suggest necrotizing fasciitis or Santhosh's gangrene. No loculated drainable rim-enhancing fluid collection/abscess. Multiple mildly prominent bilateral inguinal/groin region lymph nodes are seen, likely reactive in etiology. Subtle diffuse wall thickening of the urinary bladder could be due to under distention or cystitis, recommend correlation with urinalysis. Labs: WBC 10.66, Hgb 9.3, Na 134, Cl 92, anion gap 19, BUN 50, Cr 7.91, ast 12, gluc 169, procalcitonin 0.86.  Placed in observation status for cellulitis R groin. Plan: IVABT, cultures, renal & surgery consult, accuchecks.    4/29/25- observation:  Persistent right groin pain and tenderness; requiring IV Dilaudid for pain control; dose increased to 0.5mg for better control. IVABT continued with HD M-W-F.     Observation to IP admission 4/30/25:  To OR for:  Right - INCISION AND DRAINAGE (I&D) GROIN   Anesthesia Type: General  Operative Findings:  Large right subcutaneous groin abscess    Returned to nursing unit for post-op care: IVF, IVABT, pain mgt, monitor VS, follow labs.     Date: 5/1/25   DAY 2:  POD#1 I&D right groin abscess   Tmax 101.8, intermittent chills. IVABT continued, cultures pending. Fluid restrictions per ESRD and missed dialysis. Planned dialysis today.   Per surgery: bleeding from site last night, bandage secures. Reduced erythema with induration & tenderness persisting. Plan: Keep wound packing intact today and remove tomorrow and change/reexamine wound  Per renal: Clinically appears euvolemic, underwent hemodialysis treatment on 4/30 but had poor blood flow despite Cathflo administration and at the end was locked with Cathflo.  Received only 30 minutes of treatment without any fluid removal.  Plan for hemodialysis treatment today, will also order heparin during the hemodialysis treatment, HD  with 2K bath and higher bicarb bath as bicarb level was low. if Found to have ongoing issues with blood flow may need PermCath exchange     ED Treatment-Medication Administration from 04/28/2025 1922 to 04/29/2025 0040         Date/Time Order Dose Route Action     04/28/2025 2027 HYDROmorphone (DILAUDID) injection 0.5 mg 0.5 mg Intravenous Given     04/28/2025 2204 iohexol (OMNIPAQUE) 350 MG/ML injection (MULTI-DOSE) 100 mL 100 mL Intravenous Given     04/28/2025 2225 clindamycin in dextrose 5% (Cleocin) IVPB (premix) 600 mg 50 mL 600 mg Intravenous New Bag     04/28/2025 2224 HYDROmorphone (DILAUDID) injection 0.5 mg 0.5 mg Intravenous Given     04/28/2025 2353 HYDROmorphone (DILAUDID) injection 0.2 mg 0.2 mg Intravenous Given            Scheduled Medications:  Medications 04/22 04/23 04/24 04/25 04/26 04/27 04/28 04/29 04/30 05/01   alteplase (CATHFLO) injection 2 mg  Dose: 2 mg  Freq: Once Route: IK  Start: 04/30/25 1615 End: 04/30/25 1631   Admin Instructions:               1631         alteplase (CATHFLO) injection 2 mg  Dose: 2 mg  Freq: Once Route: IK  Start: 04/30/25 1615 End: 04/30/25 1631   Admin Instructions:               1631         alteplase (CATHFLO) injection 2 mg  Dose: 2 mg  Freq: Once Route: IK  Start: 04/30/25 1445 End: 04/30/25 1509   Admin Instructions:               1509         alteplase (CATHFLO) injection 2 mg  Dose: 2 mg  Freq: Once Route: IK  Start: 04/30/25 1445 End: 04/30/25 1509   Admin Instructions:               1509         aspirin chewable tablet 81 mg  Dose: 81 mg  Freq: Daily Route: PO  Start: 04/29/25 0900           0824      (0853)      0900        atorvastatin (LIPITOR) tablet 80 mg  Dose: 80 mg  Freq: Daily Route: PO  Start: 04/29/25 0900           0824      (0852)      0900        calcitriol (ROCALTROL) capsule 0.75 mcg  Dose: 0.75 mcg  Freq: Once per day on Monday Wednesday Friday Route: PO  Start: 04/30/25 1000            1153 4440 0724 [C]         calcium acetate  (PHOSLO) capsule 1,334 mg  Dose: 1,334 mg  Freq: 3 times daily Route: PO  Start: 04/29/25 0000   Admin Instructions:              0111     0833     1558     2005      (1559)     (3652) [C]     (2004)      0900     1600     2100        carvedilol (COREG) tablet 6.25 mg  Dose: 6.25 mg  Freq: 2 times daily with meals Route: PO  Start: 04/29/25 0730   Admin Instructions:      Order specific questions:              0804     1556 (8778) [C]     7439 6143     1630        Cholecalciferol (VITAMIN D3) tablet 2,000 Units  Dose: 2,000 Units  Freq: Daily Route: PO  Start: 04/29/25 0900   Admin Instructions:              0824      (1539)      0900        clindamycin in dextrose 5% (Cleocin) IVPB (premix) 600 mg 50 mL  Dose: 600 mg  Freq: Once Route: IV  Last Dose: Stopped (04/28/25 2255)  Start: 04/28/25 2230 End: 04/28/25 2255   Order specific questions:             2225 2255           docusate sodium (COLACE) capsule 100 mg  Dose: 100 mg  Freq: 2 times daily Route: PO  Indications of Use: CONSTIPATION  Start: 04/29/25 0900           0824     1719 (1314)     1708      0900     1800        enoxaparin (LOVENOX) subcutaneous injection 40 mg  Dose: 40 mg  Freq: Daily Route: SC  Start: 04/29/25 0900 End: 04/29/25 0014   Admin Instructions:              0014-D/C'd        heparin (porcine) injection 2,000 Units  Dose: 2,000 Units  Freq: Once in dialysis Route: IV  Indications Comment: ESRD on HD  Start: 05/01/25 0900 End: 05/01/25 1038   Admin Instructions:                1038        heparin (porcine) subcutaneous injection 7,500 Units  Dose: 7,500 Units  Freq: Every 8 hours scheduled Route: SC  Start: 04/29/25 0030   Admin Instructions:              0111     0531     1413     2119      0608     1154     1347     1412     2216      0541     1400     2200        heparin (porcine) subcutaneous injection 7,500 Units  Dose: 7,500 Units  Freq: Every 12 hours scheduled Route: SC  Start: 04/29/25 0015 End: 04/29/25 0015    Admin Instructions:              0015-D/C'd  0103) [C]          HYDROmorphone (DILAUDID) injection 0.2 mg  Dose: 0.2 mg  Freq: Once Route: IV  Start: 04/28/25 2330 End: 04/28/25 2353   Admin Instructions:             2353           HYDROmorphone (DILAUDID) injection 0.5 mg  Dose: 0.5 mg  Freq: Once Route: IV  Start: 04/29/25 1430 End: 04/29/25 1421   Admin Instructions:              1421          HYDROmorphone (DILAUDID) injection 0.5 mg  Dose: 0.5 mg  Freq: Once Route: IV  Start: 04/28/25 2230 End: 04/28/25 2224   Admin Instructions:             2224           HYDROmorphone (DILAUDID) injection 0.5 mg  Dose: 0.5 mg  Freq: Once Route: IV  Start: 04/28/25 2030 End: 04/28/25 2027   Admin Instructions:             2027            insulin lispro (HumALOG/ADMELOG) 100 units/mL subcutaneous injection 2-12 Units  Dose: 2-12 Units  Freq: 3 times daily before meals Route: SC  Start: 04/29/25 0700   Admin Instructions:              0833 [C]     1205 (1761) (7619) [C]     (6889) (2518)      0749 [C]     1238 [C]     1600        isosorbide mononitrate (IMDUR) 24 hr tablet 60 mg  Dose: 60 mg  Freq: Daily Route: PO  Start: 04/29/25 0900   Admin Instructions:      Order specific questions:              0824      (4864) [C]      0900        lidocaine (PF) (XYLOCAINE-MPF) 1 % injection 20 mL  Dose: 20 mL  Freq: Once Route: INFILTRATION  Indications of Use: LOCAL ANESTHESIA  Start: 04/29/25 1215 End: 04/29/25 1222           1222 [C]          NIFEdipine (PROCARDIA XL) 24 hr tablet 30 mg  Dose: 30 mg  Freq: Daily at bedtime Route: PO  Start: 04/29/25 0000   Admin Instructions:      Order specific questions:              0111 2120      2217      2200        sevelamer (RENAGEL) tablet 800 mg  Dose: 800 mg  Freq: 3 times daily with meals Route: PO  Start: 04/29/25 0730   Admin Instructions:              0824     1201     1559 (0044) (1423) [C]     (2115) [C]      7779 3634 1276        torsemide  (DEMADEX) tablet 100 mg  Dose: 100 mg  Freq: Daily Route: PO  Start: 04/29/25 0900   Order specific questions:              0824      (0858) [C]      0900         vancomycin (VANCOCIN) 2,000 mg in sodium chloride 0.9 % 500 mL IVPB  Dose: 2,000 mg  Freq: Once Route: IV  Last Dose: 2,000 mg (04/29/25 0211)  Start: 04/29/25 0030 End: 04/29/25 0411   Admin Instructions:      Order specific questions:              0211 [C]          vancomycin (VANCOCIN) IVPB (premix in dextrose) 750 mg 150 mL  Dose: 7.5 mg/kg  Weight Dosing Info: 109 kg (Adjusted)  Freq: Once per day on Monday Wednesday Friday Route: IV  Last Dose: 750 mg (04/30/25 1704)  Start: 04/30/25 1700   Admin Instructions:      Order specific questions:               4928 1915 5792         vit B complex-vit C-folic acid (Renal Caps) capsule 1 capsule  Dose: 1 mg  Freq: Daily with dinner Route: PO  Start: 04/29/25 1630           1554      (1704) [C]      1630        Legend:       [id number]/2204/2304/2404/2504/2604/2704/2804/2904/3005/01        Continuous Meds Sorted by Name  for Joaquin Frankel as of 04/22/25 through 5/1/25  Legend:       Medications 04/22 04/23 04/24 04/25 04/26 04/27 04/28 04/29 04/30 05/01   sodium chloride 0.9 % infusion  Freq: Continuous PRN Route: IV  Last Dose: Stopped (04/30/25 1300)  Start: 04/30/25 1152 End: 04/30/25 1230            1152     1214     1230-D/C'd  1300 [C]         Legend:       Gzbbbumbqbz60/2204/2304/2404/2504/2604/2704/2804/2904/3005/01        PRN Meds Sorted by Name  for Joaquin Frankel as of 04/22/25 through 5/1/25  Legend:       Medications 04/22 04/23 04/24 04/25 04/26 04/27 04/28 04/29 04/30 05/01   acetaminophen (TYLENOL) tablet 650 mg  Dose: 650 mg  Freq: Every 6 hours PRN Route: PO  PRN Reasons: mild pain,headaches,fever  Indications of Use: FEVER,HEADACHE,MILD PAIN  Start: 04/30/25 1346            2002         acetaminophen (TYLENOL) tablet 650 mg  Dose: 650 mg  Freq: Every 6 hours PRN Route:  PO  PRN Reason: mild pain  Indications of Use: FEVER,HEADACHE,MILD PAIN  Start: 04/28/25 2357 End: 04/30/25 1347            0854 [C]     1347-D/C'd       bupivacaine-epinephrine (PF) (MARCAINE/EPINEPHRINE PF) 0.25 %-1:[id number] injection  Freq: As needed  Start: 04/30/25 1222 End: 04/30/25 1230            1222     1230-D/C'd       fentaNYL (SUBLIMAZE) injection 50 mcg  Dose: 50 mcg  Freq: Every 10 minutes PRN Route: IV  PRN Reason: Pain - PACU  PRN Comment: Breakthrough - first line  Start: 04/30/25 1258 End: 04/30/25 1345   Admin Instructions:               1259     1323     1345-D/C'd       fentaNYL injection  Freq: As needed Route: IV  Start: 04/30/25 1158 End: 04/30/25 1230            1158     1201     1230-D/C'd       HYDROmorphone (DILAUDID) injection 0.2 mg  Dose: 0.2 mg  Freq: Every 4 hours PRN Route: IV  PRN Reasons: breakthrough pain,other  PRN Comment: or if patient qualifies for treatment of moderate or severe pain but cannot take oral medications  Start: 04/28/25 2357 End: 04/29/25 0816   Admin Instructions:              0123     0531     0816-D/C'd        HYDROmorphone (DILAUDID) injection 0.5 mg  Dose: 0.5 mg  Freq: Every 3 hours PRN Route: IV  PRN Reasons: breakthrough pain,other  PRN Comment: or if patient qualifies for treatment of moderate or severe pain but cannot take oral medications  Start: 04/29/25 0816   Admin Instructions:              0832     1201     1709     2120      0332     0846     1154     1347     1413     2001     2317      0432     0803     1029        iohexol (OMNIPAQUE) 350 MG/ML injection (MULTI-DOSE) 100 mL  Dose: 100 mL  Freq: Once in imaging Route: IV  PRN Reason: contrast  Start: 04/28/25 2204 End: 04/28/25 2204 2204           lidocaine (PF) (XYLOCAINE-MPF) 1 % injection  Freq: As needed Route: IV  Start: 04/30/25 1158 End: 04/30/25 1230            1158     1230-D/C'd       midazolam (VERSED) injection  Freq: As needed Route: IV  Start: 04/30/25 1151 End: 04/30/25  1230            1151     1230-D/C'd       naloxone (NARCAN) 0.04 mg/mL syringe 0.04 mg  Dose: 0.04 mg  Freq: Every 1 Minute as needed Route: IV  PRN Reasons: respiratory depression,opioid reversal  PRN Comment: For Respiratory Rate less than 8  Start: 04/28/25 2357   Admin Instructions:                   ondansetron (ZOFRAN) injection  Freq: As needed Route: IV  Start: 04/30/25 1218 End: 04/30/25 1230            1218     1230-D/C'd       ondansetron (ZOFRAN) injection 4 mg  Dose: 4 mg  Freq: Once as needed Route: IV  PRN Reason: nausea  PRN Comment: First Line For Nausea  Indications of Use: POSTOPERATIVE NAUSEA AND VOMITING  Start: 04/30/25 1258 End: 04/30/25 1345   Admin Instructions:               1345-D/C'd       ondansetron (ZOFRAN) injection 4 mg  Dose: 4 mg  Freq: Every 6 hours PRN Route: IV  PRN Reasons: nausea,vomiting  Start: 04/30/25 0921   Admin Instructions:               1154     1347          oxyCODONE (ROXICODONE) IR tablet 5 mg  Dose: 5 mg  Freq: Every 4 hours PRN Route: PO  PRN Reason: moderate pain  Start: 04/30/25 1346   Admin Instructions:                                       Or   oxyCODONE (ROXICODONE) immediate release tablet 10 mg  Dose: 10 mg  Freq: Every 4 hours PRN Route: PO  PRN Reason: severe pain  Start: 04/30/25 1346   Admin Instructions:               1703     2217      0304     0722         oxyCODONE (ROXICODONE) split tablet 2.5 mg  Dose: 2.5 mg  Freq: Every 4 hours PRN Route: PO  PRN Reason: moderate pain  Start: 04/28/25 2357 End: 04/30/25 1347   Admin Instructions:                                                  1347-D/C'd       Or   oxyCODONE (ROXICODONE) IR tablet 5 mg  Dose: 5 mg  Freq: Every 4 hours PRN Route: PO  PRN Reason: severe pain  Start: 04/28/25 2357 End: 04/30/25 1347   Admin Instructions:              0650     1016     1554     2005      0107     0624     1046     1347-D/C'd       propofol (DIPRIVAN) 200 MG/20ML bolus injection  Freq: As needed Route: IV  Start:  04/30/25 1158 End: 04/30/25 1230            1158     1204     1230-D/C'd       sodium chloride 0.9 % infusion  Freq: Continuous PRN Route: IV  Start: 04/30/25 1152 End: 04/30/25 1230            1152     1214     1230-D/C'd  1300 [C]         Legend:       [id number]/2204/2304/2404/2504/2604/2704/2804/2904/3005/01            ED Triage Vitals [04/28/25 1936]   Temperature Pulse Respirations Blood Pressure SpO2 Pain Score   98.1 °F (36.7 °C) 96 20 134/71 98 % 10 - Worst Possible Pain     Weight (last 2 days)       Date/Time Weight    04/29/25 0100 170 (375)            Vital Signs (last 3 days)       Date/Time Temp Pulse Resp BP MAP (mmHg) SpO2 Calculated FIO2 (%) - Nasal Cannula O2 Flow Rate (L/min) Nasal Cannula O2 Flow Rate (L/min) O2 Device Patient Position - Orthostatic VS Oksana Coma Scale Score Pain    05/01/25 1300 -- 76 18 108/65 79 -- -- -- -- -- -- -- --    05/01/25 1230 -- 76 18 110/65 80 -- -- -- -- -- -- -- --    05/01/25 1200 -- 78 18 106/54 71 -- -- -- -- -- -- -- --    05/01/25 1130 -- 78 18 85/51 62 -- -- -- -- -- -- -- --    05/01/25 1100 -- 76 18 120/62 81 -- -- -- -- -- -- -- --    05/01/25 1038 100 °F (37.8 °C) 76 18 99/57 71 -- -- -- -- -- Lying -- --    05/01/25 1029 -- -- -- -- -- -- -- -- -- -- -- -- 10 - Worst Possible Pain    05/01/25 0803 -- -- -- -- -- -- -- -- -- -- -- -- 9    05/01/25 0800 -- -- -- -- -- -- -- -- -- -- -- 15 10 - Worst Possible Pain    05/01/25 0722 -- -- -- -- -- -- -- -- -- -- -- -- 10 - Worst Possible Pain    05/01/25 0700 -- 77 18 -- -- 90 % -- -- -- None (Room air) Lying -- --    05/01/25 06:59:48 98.2 °F (36.8 °C) -- 18 122/68 86 -- -- -- -- -- -- -- --    05/01/25 0432 -- -- -- -- -- -- -- -- -- -- -- -- 9    05/01/25 0304 -- -- -- -- -- -- -- -- -- -- -- -- 10 - Worst Possible Pain    04/30/25 2313 -- -- -- -- -- -- -- -- -- -- -- -- 9    04/30/25 22:54:49 98.2 °F (36.8 °C) 79 19 117/51 73 91 % -- -- -- -- -- -- --    04/30/25 2217 -- -- -- -- -- -- -- -- -- --  -- -- 10 - Worst Possible Pain    04/30/25 20:42:47 99 °F (37.2 °C) 86 -- -- -- 95 % -- -- -- -- -- -- --    04/30/25 2001 -- 92 -- -- -- -- -- -- -- -- -- -- Med Not Given for Pain - for MAR use only    04/30/25 19:59:41 101.7 °F (38.7 °C) 92 -- 115/51 72 94 % 28 -- 2 L/min Nasal cannula -- -- --    04/30/25 17:06:25 -- 78 -- 126/70 89 -- -- -- -- -- -- -- --    04/30/25 1703 -- -- -- -- -- -- -- -- -- -- -- -- 10 - Worst Possible Pain    04/30/25 1550 -- 74 18 128/70 -- -- -- -- -- -- -- -- --    04/30/25 1500 -- 75 18 127/70 89 100 % -- -- -- -- -- -- --    04/30/25 1430 -- 74 18 120/61 81 -- -- -- -- -- -- -- --    04/30/25 1417 -- -- -- 118/60 79 -- -- -- -- -- -- -- --    04/30/25 14:16:09 97.6 °F (36.4 °C) 77 18 118/60 79 97 % -- -- -- -- Lying -- --    04/30/25 1416 -- -- -- 118/60 79 -- -- -- -- -- -- -- --    04/30/25 1413 -- -- -- -- -- -- -- -- -- -- -- -- 8    04/30/25 14:00:49 98.1 °F (36.7 °C) 87 20 120/58 79 95 % 28 -- 2 L/min Nasal cannula -- -- --    04/30/25 1330 -- 81 18 159/65 -- 96 % 28 -- 2 L/min Nasal cannula -- -- 5    04/30/25 1323 -- -- -- -- -- -- -- -- -- -- -- -- 7 04/30/25 1315 -- 79 18 159/65 -- 95 % 28 -- 2 L/min Nasal cannula -- -- 7    04/30/25 1300 -- 81 18 151/65 -- 94 % 28 -- 2 L/min Nasal cannula -- -- --    04/30/25 1259 -- -- -- -- -- -- -- -- -- -- -- -- 7 04/30/25 1245 -- 84 18 148/67 -- 94 % -- -- -- None (Room air) -- 15 8    04/30/25 1233 98.1 °F (36.7 °C) 90 18 140/60 -- 95 % -- 6 L/min -- Simple mask -- -- No Pain    04/30/25 1046 -- -- -- -- -- -- -- -- -- -- -- -- 10 - Worst Possible Pain    04/30/25 0854 -- -- -- -- -- -- -- -- -- -- -- -- Med Not Given for Pain - for MAR use only    04/30/25 08:49:26 101.8 °F (38.8 °C) 87 20 103/60 74 95 % -- -- -- None (Room air) -- -- --    04/30/25 0846 -- -- -- -- -- -- -- -- -- -- -- -- 10 - Worst Possible Pain    04/30/25 0624 -- -- -- -- -- -- -- -- -- -- -- -- 10 - Worst Possible Pain    04/30/25 0332 -- -- -- -- --  -- -- -- -- -- -- -- 10 - Worst Possible Pain    04/30/25 0107 -- -- -- -- -- -- -- -- -- -- -- -- 10 - Worst Possible Pain    04/29/25 23:12:48 97.7 °F (36.5 °C) 79 20 137/68 91 100 % -- -- -- None (Room air) -- -- --    04/29/25 2120 -- -- -- -- -- -- -- -- -- -- -- -- 10 - Worst Possible Pain    04/29/25 2005 -- -- -- -- -- -- -- -- -- -- -- -- 10 - Worst Possible Pain    04/29/25 19:47:02 97.7 °F (36.5 °C) 74 18 141/69 93 93 % -- -- -- None (Room air) -- -- --    04/29/25 1709 -- -- -- -- -- -- -- -- -- -- -- -- 10 - Worst Possible Pain    04/29/25 1554 -- -- -- -- -- -- -- -- -- -- -- -- 9    04/29/25 15:12:50 97.6 °F (36.4 °C) 73 19 128/62 84 94 % -- -- -- None (Room air) -- -- --    04/29/25 1421 -- -- -- -- -- -- -- -- -- -- -- -- 10 - Worst Possible Pain    04/29/25 1201 -- -- -- -- -- -- -- -- -- -- -- -- 10 - Worst Possible Pain    04/29/25 1016 -- -- -- -- -- -- -- -- -- -- -- -- 9    04/29/25 0832 -- 78 20 -- -- 97 % -- -- -- None (Room air) -- -- 10 - Worst Possible Pain    04/29/25 0734 97.6 °F (36.4 °C) -- -- 129/62 84 -- -- -- -- -- -- -- --    04/29/25 0650 -- -- -- -- -- -- -- -- -- -- -- -- 10 - Worst Possible Pain    04/29/25 0531 -- -- -- -- -- -- -- -- -- -- -- -- 10 - Worst Possible Pain    04/29/25 0123 -- -- -- -- -- 95 % -- -- -- None (Room air) -- -- 10 - Worst Possible Pain    04/29/25 0100 98.1 °F (36.7 °C) 82 -- -- -- -- -- -- -- -- Lying -- --    04/29/25 00:50:02 98.1 °F (36.7 °C) -- -- 141/81 101 -- -- -- -- -- -- -- --    04/29/25 0044 -- -- -- -- -- -- -- -- -- -- -- -- 10 - Worst Possible Pain    04/29/25 0000 -- 82 18 135/58 83 95 % -- -- -- None (Room air) Lying -- --    04/28/25 2353 -- -- -- -- -- -- -- -- -- -- -- -- 8    04/28/25 2315 -- 83 18 134/60 86 94 % -- -- -- None (Room air) Lying -- --    04/28/25 2224 -- -- -- -- -- -- -- -- -- -- -- -- 8    04/28/25 2200 -- 84 20 123/60 87 92 % -- -- -- None (Room air) Lying -- --    04/28/25 2155 -- 83 20 136/63 -- 95 % -- -- --  None (Room air) -- -- --    04/28/25 2027 -- -- -- -- -- -- -- -- -- -- -- -- 10 - Worst Possible Pain    04/28/25 1936 98.1 °F (36.7 °C) 96 20 134/71 -- 98 % -- -- -- None (Room air) Sitting -- 10 - Worst Possible Pain              Pertinent Labs/Diagnostic Test Results:   Radiology:  CT abdomen pelvis with contrast   Final Interpretation by Warren Benoit MD (04/28 2308)      1.  Extensive subcutaneous inflammatory stranding throughout the right groin, ventral lower pelvic wall, and bilateral scrotal sacs noted, right greater than left. Findings suggest diffuse cellulitis/fasciitis or superficial infection. No subcutaneous    air noted to suggest necrotizing fasciitis or Santhosh's gangrene. No loculated drainable rim-enhancing fluid collection/abscess. Multiple mildly prominent bilateral inguinal/groin region lymph nodes are seen, likely reactive in etiology.   2.  Subtle diffuse wall thickening of the urinary bladder could be due to under distention or cystitis, recommend correlation with urinalysis.   3.  No evidence of bowel obstruction, inflammation, appendicitis, obstructive uropathy, free air, or free fluid.         Workstation performed: [id number]           Cardiology:  No orders to display     GI:  No orders to display           Results from last 7 days   Lab Units 05/01/25 0430 04/30/25 0617 04/29/25 0527 04/28/25 2024   WBC Thousand/uL 7.68 12.69* 9.82 10.66*   HEMOGLOBIN g/dL 8.1* 9.1* 9.4* 9.3*   HEMATOCRIT % 25.3* 27.8* 28.9* 28.0*   PLATELETS Thousands/uL 294 361 352 336   TOTAL NEUT ABS Thousands/µL  --   --  6.75  --          Results from last 7 days   Lab Units 05/01/25 0430 04/30/25 0617 04/29/25 0527 04/28/25 2024   SODIUM mmol/L 132* 131* 132* 134*   POTASSIUM mmol/L 4.8 5.2 4.2 4.0   CHLORIDE mmol/L 93* 94* 92* 92*   CO2 mmol/L 20* 20* 20* 23   ANION GAP mmol/L 19* 17* 20* 19*   BUN mg/dL 74* 71* 57* 50*   CREATININE mg/dL 11.35* 10.38* 8.67* 7.91*   EGFR ml/min/1.73sq m 4 5 6 7  "  CALCIUM mg/dL 8.8 9.4 8.9 9.1     Results from last 7 days   Lab Units 04/28/25  2024   AST U/L 12*   ALT U/L 15   ALK PHOS U/L 84   TOTAL PROTEIN g/dL 8.0   ALBUMIN g/dL 3.6   TOTAL BILIRUBIN mg/dL 0.24     Results from last 7 days   Lab Units 05/01/25  1048 05/01/25  0657 04/30/25  2041 04/30/25  1537 04/30/25  1416 04/30/25  1054 04/30/25  0711 04/29/25  2035 04/29/25  1552 04/29/25  1040 04/29/25  0731   POC GLUCOSE mg/dl 104 85 90 93 102 117 138 142* 135 185* 166*     Results from last 7 days   Lab Units 05/01/25  0430 04/30/25  0617 04/29/25  0527 04/28/25 2024   GLUCOSE RANDOM mg/dL 76 136 191* 169*         Results from last 7 days   Lab Units 04/30/25  0617   HEMOGLOBIN A1C % 7.2*   EAG mg/dl 160     No results found for: \"BETA-HYDROXYBUTYRATE\"                           Results from last 7 days   Lab Units 04/28/25  2219   PROTIME seconds 14.5   INR  1.08   PTT seconds 32         Results from last 7 days   Lab Units 04/28/25  2317   PROCALCITONIN ng/ml 0.86*     Results from last 7 days   Lab Units 04/28/25  2219   LACTIC ACID mmol/L 0.7                             Results from last 7 days   Lab Units 04/30/25  1522   HEP B S AG  Non-reactive   HEP C AB  Non-reactive   HEP B C IGM  Non-reactive   HEP B C TOTAL AB  Non-reactive                                                     Results from last 7 days   Lab Units 04/29/25  1357 04/28/25  2221 04/28/25  2219   BLOOD CULTURE   --  No Growth at 48 hrs. No Growth at 48 hrs.   GRAM STAIN RESULT  No Polys or Bacteria seen  --   --    WOUND CULTURE  3+ Growth of Beta Hemolytic Streptococcus Group C*  --   --                    Past Medical History:   Diagnosis Date    Acute hypoxic respiratory failure (HCC) 10/07/2023    Arthritis     Breathing difficulty     Cellulitis 10/07/2023    Chronic kidney disease     end stage renal disease    Coronary artery disease     Diabetes mellitus (HCC)     Diabetic neuropathy (HCC) 05/28/2021    Heart disease     CAD   s/p " "ptca with 1 stent 2012    Hidradenitis suppurativa     groin    History of transfusion     Hyperlipidemia     Hypertension     MI (myocardial infarction) (HCC)     \" silent \" M.I. in the past    Morbid obesity with BMI of 50.0-59.9, adult (HCC)     Nicotine dependence     Obesity     PE (pulmonary thromboembolism) (HCC) 06/2024    small on left side, takes coumadin    Pilonidal cyst     Type 1 non-ST elevation myocardial infarction (NSTEMI) ([facility]) 11/29/2020     Present on Admission:   Abscess of right groin   Morbid obesity (HCC)   Anemia of chronic disease   hx of Pulmonary embolism (HCC)   Primary hypertension   Hyponatremia   Type 2 diabetes mellitus (HCC)   Sepsis (HCC)      Admitting Diagnosis: Cellulitis of groin [L03.314]  Abscess [L02.91]  ESRD (end stage renal disease) (HCC) [N18.6]  Age/Sex: 44 y.o. male    Network Utilization Review Department  ATTENTION: Please call with any questions or concerns to [phone] and carefully listen to the prompts so that you are directed to the right person. All voicemails are confidential.   For Discharge needs, contact Care Management DC Support Team at [phone] opt. [phone]  Send all requests for admission clinical reviews, approved or denied determinations and any other requests to dedicated fax number below belonging to the campus where the patient is receiving treatment. List of dedicated fax numbers for the Facilities:  FACILITY NAME UR FAX NUMBER   ADMISSION DENIALS (Administrative/Medical Necessity) [phone]   DISCHARGE SUPPORT TEAM (NETWORK) [phone]   PARENT CHILD HEALTH (Maternity/NICU/Pediatrics) [phone]   [facility] [phone]   [facility] [phone]   [facility] [phone]   [facility] [phone]   Counts include 234 beds at the [facility] [phone]   [facility] [phone]   [facility] " [place] [phone]   [facility] [phone]   [facility] [phone]   [facility] [phone]   [facility] [phone]   [facility] [phone]

## 2025-04-30 NOTE — CASE MANAGEMENT
Case Management Assessment    Patient name Joaquin Frankel  Location [address] [place] [address] [place] [address] MRN [id number]  : 1980 Date 2025       Current Admission Date: 2025  Current Admission Diagnosis:Abscess of right groin   Patient Active Problem List    Diagnosis Date Noted Date Diagnosed    Hyperphosphatemia 2025     Metabolic acidosis 2025     Abscess of right groin 2025     Cellulitis of left lower extremity 2024     History of PTCA with stent 2024     Dialysis patient ([facility]) 2024     MGUS (monoclonal gammopathy of unknown significance) 2024     hx of Pulmonary embolism ([facility]) 2024     ESRD (end stage renal disease) on dialysis ([facility]) 2024     Type 2 diabetes mellitus ([facility]) 2024     Volume overload 2024     Surgical follow-up care 2024     Urinary problem in male 2024     Epidermoid cyst of skin of scalp 2024     Stopped smoking with greater than 20 pack year history 2024     ANGY (obstructive sleep apnea) 2024     Long term (current) use of immunomodulator 2023     Chronic acquired lymphedema 06/15/2022     History of coronary angioplasty with insertion of stent 2022     Sepsis ([facility]) 2022     Anemia of chronic disease 2022     History of hidradenitis suppurativa 2022     Nocturnal hypoxia 2021     Morbid obesity ([facility]) 03/10/2021     Hyponatremia 2021     MI (myocardial infarction) ([facility]) 2020     Other hyperlipidemia 2017     Primary hypertension 2016     Coronary artery disease with angina pectoris ([facility]) 2014       LOS (days): 0  Geometric Mean LOS (GMLOS) (days):   Days to GMLOS:     OBJECTIVE:    Risk of Unplanned Readmission Score: 29.82     Current admission status: Inpatient    Preferred Pharmacy:   [id number] - [place], [place] [facility] [address]  [facility] [address] [address]  Phone: [phone] Fax:  [phone]    [facility] [place] - [address], [address]  [address]  [facility] [address]  Phone: [phone] Fax: [phone]    [facility] - [place] - [address]  [address]  [place] [address]  Phone: [phone] Fax: [phone]    Primary Care Provider: Hany Mcfarland DO    Primary Insurance: BLUE CROSS  Secondary Insurance: MEDICARE        Obs Notice Signed: 04/30/25 (SW met with pt to provide Outpatient Observation Status Notice. Pt signed notice and copy given. Copy also placed in scan bin for chart.)

## 2025-04-30 NOTE — ASSESSMENT & PLAN NOTE
AVSS, end-stage renal disease, morbid obesity, diabetes mellitus, vascular disease, smoking history, no leukocytosis, significant erythema and tenderness overlying the right inguinal canal down to lateral scrotum, CT scan two days ago noted extensive inflammatory changes and edema without any air noted in the soft tissue or rim-enhancing collections but patient has now developed areas of fluctuance that have expressed purulence with bedside needle aspiration yesterday   Patient febrile Tmax 101.8 and increasing WBC count 9.8 > 12.7    - NPO for I&D in the OR today  -dialysis scheduled for this afternoon  -vancomycin day 2   -Anaerobic and aerobic cultures pending

## 2025-05-01 ENCOUNTER — APPOINTMENT (INPATIENT)
Dept: DIALYSIS | Facility: HOSPITAL | Age: 45
DRG: 602 | End: 2025-05-01
Attending: INTERNAL MEDICINE
Payer: COMMERCIAL

## 2025-05-01 LAB
ANION GAP SERPL CALCULATED.3IONS-SCNC: 19 MMOL/L (ref 4–13)
BACTERIA SPEC ANAEROBE CULT: ABNORMAL
BUN SERPL-MCNC: 74 MG/DL (ref 5–25)
CALCIUM SERPL-MCNC: 8.8 MG/DL (ref 8.4–10.2)
CHLORIDE SERPL-SCNC: 93 MMOL/L (ref 96–108)
CO2 SERPL-SCNC: 20 MMOL/L (ref 21–32)
CREAT SERPL-MCNC: 11.35 MG/DL (ref 0.6–1.3)
ERYTHROCYTE [DISTWIDTH] IN BLOOD BY AUTOMATED COUNT: 13.2 % (ref 11.6–15.1)
GFR SERPL CREATININE-BSD FRML MDRD: 4 ML/MIN/1.73SQ M
GLUCOSE SERPL-MCNC: 104 MG/DL (ref 65–140)
GLUCOSE SERPL-MCNC: 138 MG/DL (ref 65–140)
GLUCOSE SERPL-MCNC: 176 MG/DL (ref 65–140)
GLUCOSE SERPL-MCNC: 76 MG/DL (ref 65–140)
GLUCOSE SERPL-MCNC: 85 MG/DL (ref 65–140)
HBV CORE AB SER QL: NORMAL
HBV CORE IGM SER QL: NORMAL
HBV SURFACE AB SER-ACNC: 17.6 MIU/ML
HBV SURFACE AG SER QL: NORMAL
HCT VFR BLD AUTO: 25.3 % (ref 36.5–49.3)
HCV AB SER QL: NORMAL
HGB BLD-MCNC: 8.1 G/DL (ref 12–17)
MCH RBC QN AUTO: 31.5 PG (ref 26.8–34.3)
MCHC RBC AUTO-ENTMCNC: 32 G/DL (ref 31.4–37.4)
MCV RBC AUTO: 98 FL (ref 82–98)
PLATELET # BLD AUTO: 294 THOUSANDS/UL (ref 149–390)
PMV BLD AUTO: 9.1 FL (ref 8.9–12.7)
POTASSIUM SERPL-SCNC: 4.8 MMOL/L (ref 3.5–5.3)
RBC # BLD AUTO: 2.57 MILLION/UL (ref 3.88–5.62)
SODIUM SERPL-SCNC: 132 MMOL/L (ref 135–147)
WBC # BLD AUTO: 7.68 THOUSAND/UL (ref 4.31–10.16)

## 2025-05-01 PROCEDURE — 82948 REAGENT STRIP/BLOOD GLUCOSE: CPT

## 2025-05-01 PROCEDURE — 99232 SBSQ HOSP IP/OBS MODERATE 35: CPT | Performed by: FAMILY MEDICINE

## 2025-05-01 PROCEDURE — 99024 POSTOP FOLLOW-UP VISIT: CPT | Performed by: SPECIALIST

## 2025-05-01 PROCEDURE — 80048 BASIC METABOLIC PNL TOTAL CA: CPT | Performed by: PHYSICIAN ASSISTANT

## 2025-05-01 PROCEDURE — 5A1D70Z PERFORMANCE OF URINARY FILTRATION, INTERMITTENT, LESS THAN 6 HOURS PER DAY: ICD-10-PCS | Performed by: INTERNAL MEDICINE

## 2025-05-01 PROCEDURE — 99232 SBSQ HOSP IP/OBS MODERATE 35: CPT | Performed by: INTERNAL MEDICINE

## 2025-05-01 PROCEDURE — 85027 COMPLETE CBC AUTOMATED: CPT | Performed by: PHYSICIAN ASSISTANT

## 2025-05-01 RX ORDER — HYDROMORPHONE HCL/PF 1 MG/ML
0.5 SYRINGE (ML) INJECTION ONCE
Status: COMPLETED | OUTPATIENT
Start: 2025-05-01 | End: 2025-05-01

## 2025-05-01 RX ORDER — NYSTATIN 100000 [USP'U]/G
POWDER TOPICAL 2 TIMES DAILY
Status: DISCONTINUED | OUTPATIENT
Start: 2025-05-01 | End: 2025-05-06 | Stop reason: HOSPADM

## 2025-05-01 RX ORDER — HEPARIN SODIUM 1000 [USP'U]/ML
2000 INJECTION, SOLUTION INTRAVENOUS; SUBCUTANEOUS
Status: COMPLETED | OUTPATIENT
Start: 2025-05-01 | End: 2025-05-01

## 2025-05-01 RX ADMIN — CHOLECALCIFEROL TAB 25 MCG (1000 UNIT) 2000 UNITS: 25 TAB at 14:34

## 2025-05-01 RX ADMIN — HYDROMORPHONE HYDROCHLORIDE 0.5 MG: 1 INJECTION, SOLUTION INTRAMUSCULAR; INTRAVENOUS; SUBCUTANEOUS at 16:46

## 2025-05-01 RX ADMIN — DOCUSATE SODIUM 100 MG: 100 CAPSULE, LIQUID FILLED ORAL at 14:34

## 2025-05-01 RX ADMIN — HEPARIN SODIUM 7500 UNITS: 5000 INJECTION INTRAVENOUS; SUBCUTANEOUS at 21:07

## 2025-05-01 RX ADMIN — OXYCODONE HYDROCHLORIDE 10 MG: 10 TABLET ORAL at 03:04

## 2025-05-01 RX ADMIN — CALCIUM ACETATE 1334 MG: 667 CAPSULE ORAL at 21:07

## 2025-05-01 RX ADMIN — NIFEDIPINE 30 MG: 30 TABLET, EXTENDED RELEASE ORAL at 21:07

## 2025-05-01 RX ADMIN — HYDROMORPHONE HYDROCHLORIDE 0.5 MG: 1 INJECTION, SOLUTION INTRAMUSCULAR; INTRAVENOUS; SUBCUTANEOUS at 08:03

## 2025-05-01 RX ADMIN — TORSEMIDE 100 MG: 100 TABLET ORAL at 14:34

## 2025-05-01 RX ADMIN — AMPICILLIN SODIUM AND SULBACTAM SODIUM 3 G: 2; 1 INJECTION, POWDER, FOR SOLUTION INTRAMUSCULAR; INTRAVENOUS at 16:52

## 2025-05-01 RX ADMIN — HYDROMORPHONE HYDROCHLORIDE 0.5 MG: 1 INJECTION, SOLUTION INTRAMUSCULAR; INTRAVENOUS; SUBCUTANEOUS at 10:29

## 2025-05-01 RX ADMIN — HYDROMORPHONE HYDROCHLORIDE 0.5 MG: 1 INJECTION, SOLUTION INTRAMUSCULAR; INTRAVENOUS; SUBCUTANEOUS at 23:50

## 2025-05-01 RX ADMIN — HYDROMORPHONE HYDROCHLORIDE 0.5 MG: 1 INJECTION, SOLUTION INTRAMUSCULAR; INTRAVENOUS; SUBCUTANEOUS at 04:32

## 2025-05-01 RX ADMIN — SEVELAMER HYDROCHLORIDE 800 MG: 800 TABLET ORAL at 16:28

## 2025-05-01 RX ADMIN — ACETAMINOPHEN 650 MG: 325 TABLET, FILM COATED ORAL at 16:50

## 2025-05-01 RX ADMIN — HYDROMORPHONE HYDROCHLORIDE 0.5 MG: 1 INJECTION, SOLUTION INTRAMUSCULAR; INTRAVENOUS; SUBCUTANEOUS at 19:46

## 2025-05-01 RX ADMIN — ATORVASTATIN CALCIUM 80 MG: 80 TABLET, FILM COATED ORAL at 14:34

## 2025-05-01 RX ADMIN — CARVEDILOL 6.25 MG: 6.25 TABLET, FILM COATED ORAL at 16:29

## 2025-05-01 RX ADMIN — DOCUSATE SODIUM 100 MG: 100 CAPSULE, LIQUID FILLED ORAL at 18:08

## 2025-05-01 RX ADMIN — HYDROMORPHONE HYDROCHLORIDE 0.5 MG: 1 INJECTION, SOLUTION INTRAMUSCULAR; INTRAVENOUS; SUBCUTANEOUS at 15:55

## 2025-05-01 RX ADMIN — SEVELAMER HYDROCHLORIDE 800 MG: 800 TABLET ORAL at 14:41

## 2025-05-01 RX ADMIN — OXYCODONE HYDROCHLORIDE 10 MG: 10 TABLET ORAL at 07:22

## 2025-05-01 RX ADMIN — CALCIUM ACETATE 1334 MG: 667 CAPSULE ORAL at 14:41

## 2025-05-01 RX ADMIN — NYSTATIN: 100000 POWDER TOPICAL at 18:08

## 2025-05-01 RX ADMIN — OXYCODONE HYDROCHLORIDE 10 MG: 10 TABLET ORAL at 14:43

## 2025-05-01 RX ADMIN — ISOSORBIDE MONONITRATE 60 MG: 60 TABLET, EXTENDED RELEASE ORAL at 14:41

## 2025-05-01 RX ADMIN — CARVEDILOL 6.25 MG: 6.25 TABLET, FILM COATED ORAL at 07:22

## 2025-05-01 RX ADMIN — OXYCODONE HYDROCHLORIDE 10 MG: 10 TABLET ORAL at 22:43

## 2025-05-01 RX ADMIN — HEPARIN SODIUM 2000 UNITS: 1000 INJECTION INTRAVENOUS; SUBCUTANEOUS at 10:38

## 2025-05-01 RX ADMIN — Medication 1 CAPSULE: at 16:29

## 2025-05-01 RX ADMIN — SEVELAMER HYDROCHLORIDE 800 MG: 800 TABLET ORAL at 07:22

## 2025-05-01 RX ADMIN — OXYCODONE HYDROCHLORIDE 10 MG: 10 TABLET ORAL at 18:44

## 2025-05-01 RX ADMIN — ASPIRIN 81 MG: 81 TABLET, CHEWABLE ORAL at 14:34

## 2025-05-01 RX ADMIN — HEPARIN SODIUM 7500 UNITS: 5000 INJECTION INTRAVENOUS; SUBCUTANEOUS at 05:41

## 2025-05-01 NOTE — ASSESSMENT & PLAN NOTE
Lab Results   Component Value Date    HGBA1C 7.2 (H) 04/30/2025   Recent hemoglobin A1c 7.2%     Tight glucose control - Humalog    Recent Labs     04/30/25  1416 04/30/25  1537 04/30/25 2041 05/01/25  0657   POCGLU 102 93 90 85       Blood Sugar Average: Last 72 hrs:  (P) 125.3

## 2025-05-01 NOTE — ASSESSMENT & PLAN NOTE
Likely due to end-stage renal disease  Hemoglobin trending down from 9.1 > 8.1   Trend hgb after dialysis  Asymptomatic currently without dizziness or lightheadedness. Continue to monitor

## 2025-05-01 NOTE — ASSESSMENT & PLAN NOTE
Lab Results   Component Value Date    HGBA1C 7.2 (H) 04/30/2025       Recent Labs     04/30/25  1416 04/30/25  1537 04/30/25 2041 05/01/25  0657   POCGLU 102 93 90 85       Blood Sugar Average: Last 72 hrs:  (P) 125.3  -Continue management per primary team

## 2025-05-01 NOTE — ASSESSMENT & PLAN NOTE
Lab Results   Component Value Date    HGBA1C 7.2 (H) 04/30/2025       Recent Labs     04/30/25  1416 04/30/25  1537 04/30/25 2041 05/01/25  0657   POCGLU 102 93 90 85     Not currently on medication  Continue SSI, Monitor blood glucose

## 2025-05-01 NOTE — ASSESSMENT & PLAN NOTE
Lab Results   Component Value Date    EGFR 4 05/01/2025    EGFR 5 04/30/2025    EGFR 6 04/29/2025    CREATININE 11.35 (H) 05/01/2025    CREATININE 10.38 (H) 04/30/2025    CREATININE 8.67 (H) 04/29/2025   Receiving supplemental dialysis 3-4 times weekly  Nephrology following   Was supposed to receive dialysis yesterday after I&D but unable to access port site so will try again today

## 2025-05-01 NOTE — ASSESSMENT & PLAN NOTE
Lab Results   Component Value Date    EGFR 4 05/01/2025    EGFR 5 04/30/2025    EGFR 6 04/29/2025    CREATININE 11.35 (H) 05/01/2025    CREATININE 10.38 (H) 04/30/2025    CREATININE 8.67 (H) 04/29/2025   End-stage renal disease on dialysis  -Outpatient unit: [facility]  -Schedule: Likely Monday Wednesday Thursday and Friday  -Access: Right IJ PermCath.  Previous attempt at fistula not successful  -Plan:   - Clinically appears euvolemic, underwent hemodialysis treatment on 4/30 but had poor blood flow despite Cathflo administration and at the end was locked with Cathflo.  Received only 30 minutes of treatment without any fluid removal.  Plan for hemodialysis treatment today, will also order heparin during the hemodialysis treatment, HD with 2K bath and higher bicarb bath as bicarb level was low. if Found to have ongoing issues with blood flow may need PermCath exchange

## 2025-05-01 NOTE — ASSESSMENT & PLAN NOTE
Patient presented to ED with pain and swelling of right groin/scrotum. Reported pimple on right scrotum that ruptured with large amount of white drainage.   CT a/p showed extensive subcutaneous inflammatory stranding throughout the right groin, ventral lower pelvic wall, bilateral scrotal sacs suggestive of cellulitis/fasciitis or superficial infection; no findings of necrotizing fasciitis or Santhosh's gangrene; no abscess was noted  Lactate negative, procal 0.86 on admission  s/p I&D in OR 4/30 and found to have large right groin abscess  Wound Cultures are pending  Continue IV vancomycin  Leukocytosis resolved.  Temp of 101.7 yesterday but fever curve has since improved

## 2025-05-01 NOTE — PROGRESS NOTES
Progress Note - Surgery-General   Name: Joaquin Frankel 44 y.o. male I MRN: [id number]  Unit/Bed#: 2 [address] Date of Admission: 4/28/2025   Date of Service: 5/1/2025 I Hospital Day: 1    Assessment & Plan  Abscess of right groin  POD#1 I&D right groin abscess   Patient febrile last night Tmax 101.8. Resolved with Tylenol.   Decreasing WBC count 12.7 > 7.68  Reported 1 instance of bleeding from site last night. Bandaging secured.     Fluid restrictions per ESRD and missed dialysis   Dialysis today  Vancomycin day 3 with pending wound cultures still. Monitor peaks/troughs  Keep wound packing intact today and remove tomorrow and change/reexamine wound    Morbid obesity (HCC)  Could be contributing factor patient currently has BMI 57, does appear the patient has lost some weight  Anemia of chronic disease  Likely due to end-stage renal disease  Hemoglobin trending down from 9.1 > 8.1   Trend hgb after dialysis  Asymptomatic currently without dizziness or lightheadedness. Continue to monitor  ESRD (end stage renal disease) on dialysis (HCC)  Lab Results   Component Value Date    EGFR 4 05/01/2025    EGFR 5 04/30/2025    EGFR 6 04/29/2025    CREATININE 11.35 (H) 05/01/2025    CREATININE 10.38 (H) 04/30/2025    CREATININE 8.67 (H) 04/29/2025   Receiving supplemental dialysis 3-4 times weekly  Nephrology following   Was supposed to receive dialysis yesterday after I&D but unable to access port site so will try again today   Type 2 diabetes mellitus (HCC)  Lab Results   Component Value Date    HGBA1C 7.2 (H) 04/30/2025   Recent hemoglobin A1c 7.2%     Tight glucose control - Humalog    Recent Labs     04/30/25  1416 04/30/25  1537 04/30/25  2041 05/01/25  0657   POCGLU 102 93 90 85       Blood Sugar Average: Last 72 hrs:  (P) 125.3    Sepsis (HCC)      Surgery-General service will follow.    Subjective   Patient has 7/10 soreness at incision site. Notes some moderate amount of bleeding from right groin incision site  last night when getting out of bed to use restroom. Bleeding was quickly controlled by nursing staff and bandaging was secured again. Patient also notes intermittent chills/feverish. No difficulties with bowel movements, urination, shortness of breath, CP, abdominal pain, nausea/vomiting. Patient also did not receive dialysis as planned yesterday and will try again for today.     Objective :  Temp:  [97.6 °F (36.4 °C)-101.7 °F (38.7 °C)] 98.2 °F (36.8 °C)  HR:  [74-92] 77  BP: (115-159)/(51-70) 122/68  Resp:  [18-20] 18  SpO2:  [90 %-100 %] 90 %  O2 Device: None (Room air)  Nasal Cannula O2 Flow Rate (L/min):  [2 L/min] 2 L/min    I/O         04/29 0701  04/30 0700 04/30 0701  05/01 0700 05/01 0701  05/02 0700    I.V. (mL/kg)  800 (4.7)     Other  400     IV Piggyback       Total Intake(mL/kg)  1200 (7.1)     Net  +1200                  Lines/Drains/Airways       Active Status       Name Placement date Placement time Site Days    HD Permanent Double Catheter 02/05/25  1609  Internal jugular  84                  Physical Exam  Vitals reviewed.   Constitutional:       General: He is not in acute distress.     Appearance: He is obese. He is not ill-appearing, toxic-appearing or diaphoretic.   Eyes:      General: No scleral icterus.        Right eye: No discharge.         Left eye: No discharge.      Pupils: Pupils are equal, round, and reactive to light.   Cardiovascular:      Rate and Rhythm: Normal rate and regular rhythm.      Pulses: Normal pulses.      Heart sounds: Normal heart sounds. No murmur heard.     No friction rub. No gallop.   Pulmonary:      Effort: Pulmonary effort is normal. No respiratory distress.      Breath sounds: Normal breath sounds. No stridor. No wheezing, rhonchi or rales.   Chest:      Chest wall: No tenderness.   Abdominal:      General: Bowel sounds are normal. There is distension.      Palpations: Abdomen is soft.      Tenderness: There is no abdominal tenderness. There is no guarding or  rebound.      Hernia: No hernia is present.   Genitourinary:     Comments: Bandaging present on right groin  Musculoskeletal:         General: No swelling, tenderness, deformity or signs of injury.      Right lower leg: No edema.      Left lower leg: Edema (chronic lymphedema) present.   Skin:     General: Skin is warm and dry.      Capillary Refill: Capillary refill takes less than 2 seconds.      Coloration: Skin is not pale.      Findings: Abscess and wound present. No bruising, erythema, lesion or rash.          Neurological:      General: No focal deficit present.      Mental Status: He is alert and oriented to person, place, and time.   Psychiatric:         Mood and Affect: Mood normal.         Behavior: Behavior normal.         Lab Results: I have reviewed the following results:  Recent Labs     04/28/25 2024 04/28/25 2219 04/29/25  0527 05/01/25  0430   WBC 10.66*  --    < > 7.68   HGB 9.3*  --    < > 8.1*   HCT 28.0*  --    < > 25.3*     --    < > 294   SODIUM 134*  --    < > 132*   K 4.0  --    < > 4.8   CL 92*  --    < > 93*   CO2 23  --    < > 20*   BUN 50*  --    < > 74*   CREATININE 7.91*  --    < > 11.35*   GLUC 169*  --    < > 76   AST 12*  --   --   --    ALT 15  --   --   --    ALB 3.6  --   --   --    TBILI 0.24  --   --   --    ALKPHOS 84  --   --   --    PTT  --  32  --   --    INR  --  1.08  --   --    LACTICACID  --  0.7  --   --     < > = values in this interval not displayed.       Imaging Results Review: No pertinent imaging studies reviewed.  Other Study Results Review: No additional pertinent studies reviewed.    VTE Pharmacologic Prophylaxis: VTE covered by:  heparin (porcine), Intravenous  heparin (porcine), Subcutaneous, 7,500 Units at 05/01/25 0541     VTE Mechanical Prophylaxis: sequential compression device

## 2025-05-01 NOTE — PROGRESS NOTES
Progress Note - Hospitalist   Name: Joaquin Frankel 44 y.o. male I MRN: [id number]  Unit/Bed#: 2 [address] Date of Admission: 4/28/2025   Date of Service: 5/1/2025 I Hospital Day: 1    Assessment & Plan  Abscess of right groin  Patient presented to ED with pain and swelling of right groin/scrotum. Reported pimple on right scrotum that ruptured with large amount of white drainage.   CT a/p showed extensive subcutaneous inflammatory stranding throughout the right groin, ventral lower pelvic wall, bilateral scrotal sacs suggestive of cellulitis/fasciitis or superficial infection; no findings of necrotizing fasciitis or Santhosh's gangrene; no abscess was noted  Lactate negative, procal 0.86 on admission  s/p I&D in OR 4/30 and found to have large right groin abscess  Wound Cultures are pending  Continue IV vancomycin  Leukocytosis resolved.  Temp of 101.7 yesterday but fever curve has since improved   Sepsis (HCC)  Not present on admission as noted by fever and leukocytosis in setting of R groin abscess  Lactate WNL, procal 0.86 on admission  Blood cultures negative so far  Continue IV vancomycin  ESRD (end stage renal disease) on dialysis ([facility])  Lab Results   Component Value Date    EGFR 4 05/01/2025    EGFR 5 04/30/2025    EGFR 6 04/29/2025    CREATININE 11.35 (H) 05/01/2025    CREATININE 10.38 (H) 04/30/2025    CREATININE 8.67 (H) 04/29/2025     On HD on Monday Wednesday Thursday Friday  Nephrology following, input appreciated  Continue HD as per nephrology  Continue Phoslo, nephrocaps, torsemide  Follow BMP in a.m.  FR 1800  Anemia of chronic disease  Hgb appears around baseline  Receives Venofer and Mircera outpatient  No signs or symptoms of bleeding  Repeat lab work in a.m.   hx of Pulmonary embolism ([facility])  Completed treatment with warfarin  Primary hypertension  Continue home coreg, nifedipine, torsemide  BPs stable  Type 2 diabetes mellitus ([facility])  Lab Results   Component Value Date    HGBA1C 7.2 (H)  04/30/2025       Recent Labs     04/30/25  1416 04/30/25  1537 04/30/25  2041 05/01/25  0657   POCGLU 102 93 90 85     Not currently on medication  Continue SSI, Monitor blood glucose  Hyponatremia  Sodium level 132.  Continue fluid restriction   Management with dialysis  Hyperphosphatemia  Continue PhosLo, sevelamer.  Metabolic acidosis  BiCarb level low, bicarb bath on HD per nephrology  Morbid obesity (HCC)  Body mass index is 57.02 kg/m².   Patient would benefit from dietary and lifestyle changes    VTE Pharmacologic Prophylaxis:    heparin    Mobility:   Basic Mobility Inpatient Raw Score: 22  JH-HLM Goal: 7: Walk 25 feet or more  JH-HLM Achieved: 6: Walk 10 steps or more  JH-HLM Goal NOT achieved. Continue with multidisciplinary rounding and encourage appropriate mobility to improve upon JH-HLM goals.    Patient Centered Rounds: I performed bedside rounds with nursing staff today.   Discussions with Specialists or Other Care Team Provider: yes - nephrology    Education and Discussions with Family / Patient: yes    Current Length of Stay: 1 day(s)  Current Patient Status: Inpatient   Certification Statement: The patient will continue to require additional inpatient hospital stay due to right groin abscess status post I&D awaiting cultures  Discharge Plan: Anticipate discharge in 48-72 hrs to home.    Code Status: Level 1 - Full Code    Subjective   Patient reports pain along the I&D site which does improve with pain medications    Objective :  Temp:  [97.6 °F (36.4 °C)-101.7 °F (38.7 °C)] 98.2 °F (36.8 °C)  HR:  [74-92] 77  BP: (115-159)/(51-70) 122/68  Resp:  [18-20] 18  SpO2:  [90 %-100 %] 90 %  O2 Device: None (Room air)  Nasal Cannula O2 Flow Rate (L/min):  [2 L/min] 2 L/min    Body mass index is 57.02 kg/m².     Input and Output Summary (last 24 hours):     Intake/Output Summary (Last 24 hours) at 5/1/2025 0921  Last data filed at 4/30/2025 1550  Gross per 24 hour   Intake 1200 ml   Output --   Net 1200 ml        Physical Exam  Vitals reviewed.   Constitutional:       General: He is not in acute distress.     Appearance: He is not toxic-appearing.   HENT:      Head: Normocephalic and atraumatic.   Eyes:      General:         Right eye: No discharge.         Left eye: No discharge.   Cardiovascular:      Rate and Rhythm: Normal rate and regular rhythm.   Pulmonary:      Effort: Pulmonary effort is normal. No respiratory distress.      Breath sounds: Normal breath sounds. No wheezing or rales.   Abdominal:      General: Bowel sounds are normal. There is no distension.      Palpations: Abdomen is soft.      Tenderness: There is no abdominal tenderness.   Musculoskeletal:      Right lower leg: No edema.      Left lower leg: No edema.   Skin:     Comments: Right groin area with dressing in place   Neurological:      Mental Status: He is alert and oriented to person, place, and time.         Lines/Drains:  Lines/Drains/Airways       Active Status       Name Placement date Placement time Site Days    HD Permanent Double Catheter 02/05/25  1609  Internal jugular  84                            Lab Results: I have reviewed the following results:   Results from last 7 days   Lab Units 05/01/25 0430 04/29/25 0527 04/28/25 2024   WBC Thousand/uL 7.68   < > 10.66*   HEMOGLOBIN g/dL 8.1*   < > 9.3*   HEMATOCRIT % 25.3*   < > 28.0*   PLATELETS Thousands/uL 294   < > 336   LYMPHO PCT %  --   --  13*   MONO PCT %  --   --  3*   EOS PCT %  --   --  4    < > = values in this interval not displayed.     Results from last 7 days   Lab Units 05/01/25 0430 04/29/25 0527 04/28/25 2024   SODIUM mmol/L 132*   < > 134*   POTASSIUM mmol/L 4.8   < > 4.0   CHLORIDE mmol/L 93*   < > 92*   CO2 mmol/L 20*   < > 23   BUN mg/dL 74*   < > 50*   CREATININE mg/dL 11.35*   < > 7.91*   ANION GAP mmol/L 19*   < > 19*   CALCIUM mg/dL 8.8   < > 9.1   ALBUMIN g/dL  --   --  3.6   TOTAL BILIRUBIN mg/dL  --   --  0.24   ALK PHOS U/L  --   --  84   ALT U/L  --    --  15   AST U/L  --   --  12*   GLUCOSE RANDOM mg/dL 76   < > 169*    < > = values in this interval not displayed.     Results from last 7 days   Lab Units 04/28/25  2219   INR  1.08     Results from last 7 days   Lab Units 05/01/25  0657 04/30/25  2041 04/30/25  1537 04/30/25  1416 04/30/25  1054 04/30/25  0711 04/29/25  2035 04/29/25  1552 04/29/25  1040 04/29/25  0731   POC GLUCOSE mg/dl 85 90 93 102 117 138 142* 135 185* 166*     Results from last 7 days   Lab Units 04/30/25  0617   HEMOGLOBIN A1C % 7.2*     Results from last 7 days   Lab Units 04/28/25  2317 04/28/25  2219   LACTIC ACID mmol/L  --  0.7   PROCALCITONIN ng/ml 0.86*  --        Recent Cultures (last 7 days):   Results from last 7 days   Lab Units 04/29/25  1357 04/28/25  2221 04/28/25  2219   BLOOD CULTURE   --  No Growth at 24 hrs. No Growth at 24 hrs.   GRAM STAIN RESULT  No Polys or Bacteria seen  --   --    WOUND CULTURE  No growth  --   --        Imaging Results Review: I reviewed radiology reports from this admission including: CT abdomen/pelvis.  Other Study Results Review: Other studies reviewed include: operative report    Last 24 Hours Medication List:     Current Facility-Administered Medications:     acetaminophen (TYLENOL) tablet 650 mg, Q6H PRN    aspirin chewable tablet 81 mg, Daily    atorvastatin (LIPITOR) tablet 80 mg, Daily    calcitriol (ROCALTROL) capsule 0.75 mcg, Once per day on Monday Wednesday Friday    calcium acetate (PHOSLO) capsule 1,334 mg, TID    carvedilol (COREG) tablet 6.25 mg, BID With Meals    Cholecalciferol (VITAMIN D3) tablet 2,000 Units, Daily    docusate sodium (COLACE) capsule 100 mg, BID    heparin (porcine) injection 2,000 Units, Once in dialysis    heparin (porcine) subcutaneous injection 7,500 Units, Q8H SHAZIA    HYDROmorphone (DILAUDID) injection 0.5 mg, Q3H PRN    insulin lispro (HumALOG/ADMELOG) 100 units/mL subcutaneous injection 2-12 Units, TID AC **AND** Fingerstick Glucose (POCT), TID AC     isosorbide mononitrate (IMDUR) 24 hr tablet 60 mg, Daily    naloxone (NARCAN) 0.04 mg/mL syringe 0.04 mg, Q1MIN PRN    NIFEdipine (PROCARDIA XL) 24 hr tablet 30 mg, HS    ondansetron (ZOFRAN) injection 4 mg, Q6H PRN    oxyCODONE (ROXICODONE) IR tablet 5 mg, Q4H PRN **OR** oxyCODONE (ROXICODONE) immediate release tablet 10 mg, Q4H PRN    sevelamer (RENAGEL) tablet 800 mg, TID With Meals    torsemide (DEMADEX) tablet 100 mg, Daily    vancomycin (VANCOCIN) IVPB (premix in dextrose) 750 mg 150 mL, Once per day on Monday Wednesday Friday, Last Rate: 750 mg (04/30/25 1704)    vit B complex-vit C-folic acid (Renal Caps) capsule 1 capsule, Daily With Dinner    Administrative Statements   Today, Patient Was Seen By: Wanda Navarro DO    **Please Note: This note may have been constructed using a voice recognition system.**

## 2025-05-01 NOTE — PROGRESS NOTES
Progress Note - Nephrology   Name: Joaquin Frankel 44 y.o. male I MRN: [id number]  Unit/Bed#: 2 [place] [address] I Date of Admission: 4/28/2025   Date of Service: 5/1/2025 I Hospital Day: 1    Assessment & Plan  ESRD (end stage renal disease) on dialysis ([facility])  Lab Results   Component Value Date    EGFR 4 05/01/2025    EGFR 5 04/30/2025    EGFR 6 04/29/2025    CREATININE 11.35 (H) 05/01/2025    CREATININE 10.38 (H) 04/30/2025    CREATININE 8.67 (H) 04/29/2025   End-stage renal disease on dialysis  -Outpatient unit: [facility]  -Schedule: Likely Monday Wednesday Thursday and Friday  -Access: Right IJ PermCath.  Previous attempt at fistula not successful  -Plan:   - Clinically appears euvolemic, underwent hemodialysis treatment on 4/30 but had poor blood flow despite Cathflo administration and at the end was locked with Cathflo.  Received only 30 minutes of treatment without any fluid removal.  Plan for hemodialysis treatment today, will also order heparin during the hemodialysis treatment, HD with 2K bath and higher bicarb bath as bicarb level was low. if Found to have ongoing issues with blood flow may need PermCath exchange     Primary hypertension  -Blood pressure stable and is at goal.  Continue current antihypertensive medications-Coreg 6.25 twice daily, nifedipine 30 mg daily, torsemide 100 mg daily. .  Coreg was not listed at [facility] record.  -> As per outpatient [facility] record also on metolazone 10 mg 2 times a week, will discuss with patient if he has been taking it  Anemia of chronic disease  -Hemoglobin below target at  8.1 g/dL.  no JOSSELYN due to past history of PE.  Continue to monitor hemoglobin   -as outpatient on Venofer 100 mg weekly.  Also on Mircera 60 mcg every 2 weeks.  Would consider inpatient JOSSELYN if prolonged hospital stay  Hyperphosphatemia  -Monitor phosphorus level.  Continue binders-PhosLo and sevelamer currently.  Will review records from [facility].   Last phosphorus level was elevated as  outpatient, so okay for higher dose of PhosLo  -Secondary hyperparathyroidism of renal origin, continue calcitriol 0.75 mcg 3 times a week  Hyponatremia  - Sodium level was low at 132 meq/L.  Due to decreased free water excretion, recommend fluid restriction.  Continue UF with HD  Metabolic acidosis  -Bicarb level is low at 20, will use higher bicarb bath on HD  Abscess of right groin  -Results of CT reviewed, cellulitis right groin as per CT report.     -.  Seen by surgery, status post I&D on 4/30  hx of Pulmonary embolism (HCC)  -Continue anticoagulation per primary team  Type 2 diabetes mellitus (HCC)  Lab Results   Component Value Date    HGBA1C 7.2 (H) 04/30/2025       Recent Labs     04/30/25  1416 04/30/25  1537 04/30/25  2041 05/01/25  0657   POCGLU 102 93 90 85       Blood Sugar Average: Last 72 hrs:  (P) 125.3  -Continue management per primary team    I have reviewed the nephrology recommendations including plan for hemodialysis treatment today and again tomorrow, with primary team, and we are in agreement with renal plan including the information outlined above.     Subjective   No new complaints.  No chest pain or shortness of breath, no nausea vomiting    Objective :  Temp:  [97.6 °F (36.4 °C)-101.8 °F (38.8 °C)] 98.2 °F (36.8 °C)  HR:  [74-92] 77  BP: (103-159)/(51-70) 122/68  Resp:  [18-20] 18  SpO2:  [90 %-100 %] 90 %  O2 Device: None (Room air)  Nasal Cannula O2 Flow Rate (L/min):  [2 L/min] 2 L/min    Current Weight: Weight - Scale: (!) 170 kg (375 lb)  First Weight: Weight - Scale: (!) 171 kg (376 lb)  I/O         04/29 0701 04/30 0700 04/30 0701 05/01 0700 05/01 0701  05/02 0700    I.V. (mL/kg)  800 (4.7)     Other  400     IV Piggyback       Total Intake(mL/kg)  1200 (7.1)     Net  +1200                  Physical Exam   General:  Ill looking, awake.  Eyes: Conjunctivae pink,  Sclera anicteric  ENT: lips and mucous membranes moist  Neck: supple   Chest: Clear to Auscultation both lungs,  no  crackles, ronchus or wheezing.  CVS: S1 & S2 present, normal rate, regular rhythm, no murmur.  Abdomen: soft, non-tender, non-distended, Bowel sounds normoactive.  Right groin swelling  Extremities: Left leg lymphedema  Skin: no rash  Neuro: awake, alert, oriented x 3  Psych: Mood and affect appropriate      Medications:    Current Facility-Administered Medications:     acetaminophen (TYLENOL) tablet 650 mg, 650 mg, Oral, Q6H PRN, Tim Humphrey PA-C, 650 mg at 04/30/25 2002    aspirin chewable tablet 81 mg, 81 mg, Oral, Daily, Tim Humphrey PA-C, 81 mg at 04/29/25 0824    atorvastatin (LIPITOR) tablet 80 mg, 80 mg, Oral, Daily, Tim Humphrey PA-C, 80 mg at 04/29/25 0824    calcitriol (ROCALTROL) capsule 0.75 mcg, 0.75 mcg, Oral, Once per day on Monday Wednesday Friday, Tim Humphrey PA-C, 0.75 mcg at 04/30/25 1412    calcium acetate (PHOSLO) capsule 1,334 mg, 1,334 mg, Oral, TID, Tim Humphrey PA-C, 1,334 mg at 04/29/25 2005    carvedilol (COREG) tablet 6.25 mg, 6.25 mg, Oral, BID With Meals, Tim Humphrey PA-C, 6.25 mg at 05/01/25 0722    Cholecalciferol (VITAMIN D3) tablet 2,000 Units, 2,000 Units, Oral, Daily, Tim Humphrey PA-C, 2,000 Units at 04/29/25 0824    docusate sodium (COLACE) capsule 100 mg, 100 mg, Oral, BID, Tim Humphrey PA-C, 100 mg at 04/30/25 1704    heparin (porcine) subcutaneous injection 7,500 Units, 7,500 Units, Subcutaneous, Q8H SHAZIA, Tim Humphrey PA-C, 7,500 Units at 05/01/25 0541    HYDROmorphone (DILAUDID) injection 0.5 mg, 0.5 mg, Intravenous, Q3H PRN, Tim Humphrey PA-C, 0.5 mg at 05/01/25 0803    insulin lispro (HumALOG/ADMELOG) 100 units/mL subcutaneous injection 2-12 Units, 2-12 Units, Subcutaneous, TID AC, 2 Units at 04/29/25 1204 **AND** Fingerstick Glucose (POCT), , , TID AC, Tim Humphrey PA-C    isosorbide mononitrate (IMDUR) 24 hr tablet 60 mg, 60 mg, Oral, Daily, Tim Humphrey PA-C, 60 mg at 04/29/25 0824    naloxone (NARCAN) 0.04 mg/mL syringe 0.04 mg, 0.04 mg, Intravenous,  "Q1MIN PRN, Tim Humphrey PA-C    NIFEdipine (PROCARDIA XL) 24 hr tablet 30 mg, 30 mg, Oral, HS, Tim Humphrey PA-C, 30 mg at 04/30/25 2217    ondansetron (ZOFRAN) injection 4 mg, 4 mg, Intravenous, Q6H PRN, Tim Humphrey PA-C    oxyCODONE (ROXICODONE) IR tablet 5 mg, 5 mg, Oral, Q4H PRN **OR** oxyCODONE (ROXICODONE) immediate release tablet 10 mg, 10 mg, Oral, Q4H PRN, Tim Humphrey PA-C, 10 mg at 05/01/25 0722    sevelamer (RENAGEL) tablet 800 mg, 800 mg, Oral, TID With Meals, Tim Humphrey PA-C, 800 mg at 05/01/25 0722    torsemide (DEMADEX) tablet 100 mg, 100 mg, Oral, Daily, Tim Humphrey PA-C, 100 mg at 04/29/25 0824    vancomycin (VANCOCIN) IVPB (premix in dextrose) 750 mg 150 mL, 7.5 mg/kg (Adjusted), Intravenous, Once per day on Monday Wednesday Friday, Tim Humphrey PA-C, Last Rate: 150 mL/hr at 04/30/25 1704, 750 mg at 04/30/25 1704    vit B complex-vit C-folic acid (Renal Caps) capsule 1 capsule, 1 mg, Oral, Daily With Dinner, Tim Humphrey PA-C, 1 capsule at 04/29/25 1554      Lab Results: I have reviewed the following results:  Results from last 7 days   Lab Units 05/01/25  0430 04/30/25  0617 04/29/25  0527 04/28/25 2024   WBC Thousand/uL 7.68 12.69* 9.82 10.66*   HEMOGLOBIN g/dL 8.1* 9.1* 9.4* 9.3*   HEMATOCRIT % 25.3* 27.8* 28.9* 28.0*   PLATELETS Thousands/uL 294 361 352 336   POTASSIUM mmol/L 4.8 5.2 4.2 4.0   CHLORIDE mmol/L 93* 94* 92* 92*   CO2 mmol/L 20* 20* 20* 23   BUN mg/dL 74* 71* 57* 50*   CREATININE mg/dL 11.35* 10.38* 8.67* 7.91*   CALCIUM mg/dL 8.8 9.4 8.9 9.1   ALBUMIN g/dL  --   --   --  3.6       Administrative Statements     Portions of the record may have been created with voice recognition software. Occasional wrong word or \"sound a like\" substitutions may have occurred due to the inherent limitations of voice recognition software. Read the chart carefully and recognize, using context, where substitutions have occurred.If you have any questions, please contact the dictating " provider.

## 2025-05-01 NOTE — PLAN OF CARE
Post-Dialysis RN Treatment Note    Blood Pressure:  Pre 99/57 mm/Hg  Post 115/71 mmHg   EDW:  NA kg    Weight:  Pre 173 kg   Post 169 kg   Mode of weight measurement: Standing Scale   Volume Removed:  3995 ml    Treatment duration: 240 minutes    NS given:  No    Treatment shortened No   Medications given during Rx: None Reported   Estimated Kt/V:  None Reported   Access type: Permacath/TDC   Needle Gauge:  NA   Access Issues: Yes, describe: Cathflo removed which had dwelled overnight, attempted to run regular, lost flow in 10 minutes, reversed for awhile and also had arterial issues.  Spoke with Dr Viramontes regarding issues, flushed both ports and connected regular again at 300 BFR which lasted until about the last 1 hour then decreased to 250 BFR.  This lasted until the last 15 minutes which resulted in the start of system beginning to clot.       Report called to primary nurse:   Yes Dulce Maria Son RN        Current hemodialysis plan of care is to remove a total of 4500 ml of fluid over a 4 hour treatment for a net of 4 liters as tolerated.  Monitor vital signs every 15 minutes while on treatment for patient safety.  Utilize a 2 K+ bath for serum potassium of 4.8 to maintain electrolyte balance.  Report received from Dulce Maria Son RN.  Plan reviewed with Dr Viramontes via [employer].        Problem: METABOLIC, FLUID AND ELECTROLYTES - ADULT  Goal: Electrolytes maintained within normal limits  Description: INTERVENTIONS:- Monitor labs and assess patient for signs and symptoms of electrolyte imbalances- Administer electrolyte replacement as ordered- Monitor response to electrolyte replacements, including repeat lab results as appropriate- Instruct patient on fluid and nutrition as appropriate  Outcome: Progressing  Goal: Fluid balance maintained  Description: INTERVENTIONS:- Monitor labs - Monitor I/O and WT- Instruct patient on fluid and nutrition as appropriate- Assess for signs & symptoms of volume excess or  deficit  Outcome: Progressing

## 2025-05-01 NOTE — ASSESSMENT & PLAN NOTE
Not present on admission as noted by fever and leukocytosis in setting of R groin abscess  Lactate WNL, procal 0.86 on admission  Blood cultures negative so far  Continue IV vancomycin

## 2025-05-01 NOTE — PROGRESS NOTES
Joaquin Frankel is a 44 y.o. male who is currently ordered Vancomycin IV with management by the Pharmacy Consult service.  Relevant clinical data and objective / subjective history reviewed.  Vancomycin Assessment:  Indication and Goal AUC/Trough: Soft tissue (goal -600, trough >10), -600, trough >10  Clinical Status: stable  Micro:     Renal Function:  SCr: 12.9 mg/dL  CrCl: 11.35 mL/min  Renal replacement: HD  Days of Therapy: 3  Current Dose: 750mg IV after dialysis M-W-F  Vancomycin Plan:  New Dosing: continue current dose  Estimated Trough: 15-20 mcg/mL before dialysis  Next Level: 05/2/25 @ 0600  Renal Function Monitoring: Daily BMP and UOP  Pharmacy will continue to follow closely for s/sx of nephrotoxicity, infusion reactions and appropriateness of therapy.  BMP and CBC will be ordered per protocol. We will continue to follow the patient’s culture results and clinical progress daily.    Laura Christian, Pharmacist

## 2025-05-01 NOTE — ASSESSMENT & PLAN NOTE
POD#1 I&D right groin abscess   Patient febrile last night Tmax 101.8. Resolved with Tylenol.   Decreasing WBC count 12.7 > 7.68  Reported 1 instance of bleeding from site last night. Bandaging secured.     Fluid restrictions per ESRD and missed dialysis   Dialysis today  Vancomycin day 3 with pending wound cultures still. Monitor peaks/troughs  Keep wound packing intact today and remove tomorrow and change/reexamine wound

## 2025-05-01 NOTE — ASSESSMENT & PLAN NOTE
-Results of CT reviewed, cellulitis right groin as per CT report.     -.  Seen by surgery, status post I&D on 4/30

## 2025-05-01 NOTE — ASSESSMENT & PLAN NOTE
Lab Results   Component Value Date    EGFR 4 05/01/2025    EGFR 5 04/30/2025    EGFR 6 04/29/2025    CREATININE 11.35 (H) 05/01/2025    CREATININE 10.38 (H) 04/30/2025    CREATININE 8.67 (H) 04/29/2025     On HD on Monday Wednesday Thursday Friday  Nephrology following, input appreciated  Continue HD as per nephrology  Continue Phoslo, nephrocaps, torsemide  Follow BMP in a.m.  FR 1800

## 2025-05-01 NOTE — ASSESSMENT & PLAN NOTE
- Sodium level was low at 132 meq/L.  Due to decreased free water excretion, recommend fluid restriction.  Continue UF with HD

## 2025-05-01 NOTE — ASSESSMENT & PLAN NOTE
-Blood pressure stable and is at goal.  Continue current antihypertensive medications-Coreg 6.25 twice daily, nifedipine 30 mg daily, torsemide 100 mg daily. .  Coreg was not listed at [facility] record.  -> As per outpatient [facility] record also on metolazone 10 mg 2 times a week, will discuss with patient if he has been taking it

## 2025-05-01 NOTE — ASSESSMENT & PLAN NOTE
Hgb appears around baseline  Receives Venofer and Mircera outpatient  No signs or symptoms of bleeding  Repeat lab work in a.m.

## 2025-05-01 NOTE — ASSESSMENT & PLAN NOTE
-Monitor phosphorus level.  Continue binders-PhosLo and sevelamer currently.  Will review records from [facility].   Last phosphorus level was elevated as outpatient, so okay for higher dose of PhosLo  -Secondary hyperparathyroidism of renal origin, continue calcitriol 0.75 mcg 3 times a week

## 2025-05-01 NOTE — ASSESSMENT & PLAN NOTE
-Hemoglobin below target at  8.1 g/dL.  no JOSSELYN due to past history of PE.  Continue to monitor hemoglobin   -as outpatient on Venofer 100 mg weekly.  Also on Mircera 60 mcg every 2 weeks.  Would consider inpatient JOSSELYN if prolonged hospital stay

## 2025-05-02 ENCOUNTER — APPOINTMENT (INPATIENT)
Dept: DIALYSIS | Facility: HOSPITAL | Age: 45
DRG: 602 | End: 2025-05-02
Payer: COMMERCIAL

## 2025-05-02 LAB
ABO GROUP BLD: NORMAL
ANION GAP SERPL CALCULATED.3IONS-SCNC: 19 MMOL/L (ref 4–13)
BACTERIA SPEC ANAEROBE CULT: ABNORMAL
BACTERIA WND AEROBE CULT: ABNORMAL
BACTERIA WND AEROBE CULT: ABNORMAL
BLD GP AB SCN SERPL QL: NEGATIVE
BUN SERPL-MCNC: 49 MG/DL (ref 5–25)
CALCIUM SERPL-MCNC: 8.9 MG/DL (ref 8.4–10.2)
CHLORIDE SERPL-SCNC: 89 MMOL/L (ref 96–108)
CO2 SERPL-SCNC: 25 MMOL/L (ref 21–32)
CREAT SERPL-MCNC: 9.18 MG/DL (ref 0.6–1.3)
ERYTHROCYTE [DISTWIDTH] IN BLOOD BY AUTOMATED COUNT: 13.4 % (ref 11.6–15.1)
GFR SERPL CREATININE-BSD FRML MDRD: 6 ML/MIN/1.73SQ M
GLUCOSE SERPL-MCNC: 138 MG/DL (ref 65–140)
GLUCOSE SERPL-MCNC: 140 MG/DL (ref 65–140)
GLUCOSE SERPL-MCNC: 140 MG/DL (ref 65–140)
GLUCOSE SERPL-MCNC: 154 MG/DL (ref 65–140)
GLUCOSE SERPL-MCNC: 155 MG/DL (ref 65–140)
GRAM STN SPEC: ABNORMAL
GRAM STN SPEC: ABNORMAL
HCT VFR BLD AUTO: 18.4 % (ref 36.5–49.3)
HCT VFR BLD AUTO: 24.8 % (ref 36.5–49.3)
HGB BLD-MCNC: 6 G/DL (ref 12–17)
HGB BLD-MCNC: 8.3 G/DL (ref 12–17)
MCH RBC QN AUTO: 31.7 PG (ref 26.8–34.3)
MCHC RBC AUTO-ENTMCNC: 32.6 G/DL (ref 31.4–37.4)
MCV RBC AUTO: 97 FL (ref 82–98)
PHOSPHATE SERPL-MCNC: 7 MG/DL (ref 2.7–4.5)
PLATELET # BLD AUTO: 231 THOUSANDS/UL (ref 149–390)
PMV BLD AUTO: 9.7 FL (ref 8.9–12.7)
POTASSIUM SERPL-SCNC: 4 MMOL/L (ref 3.5–5.3)
RBC # BLD AUTO: 1.89 MILLION/UL (ref 3.88–5.62)
RH BLD: POSITIVE
SODIUM SERPL-SCNC: 133 MMOL/L (ref 135–147)
SPECIMEN EXPIRATION DATE: NORMAL
WBC # BLD AUTO: 8.99 THOUSAND/UL (ref 4.31–10.16)

## 2025-05-02 PROCEDURE — 85027 COMPLETE CBC AUTOMATED: CPT | Performed by: PHYSICIAN ASSISTANT

## 2025-05-02 PROCEDURE — 86850 RBC ANTIBODY SCREEN: CPT | Performed by: FAMILY MEDICINE

## 2025-05-02 PROCEDURE — 86901 BLOOD TYPING SEROLOGIC RH(D): CPT | Performed by: FAMILY MEDICINE

## 2025-05-02 PROCEDURE — 86900 BLOOD TYPING SEROLOGIC ABO: CPT | Performed by: FAMILY MEDICINE

## 2025-05-02 PROCEDURE — 99233 SBSQ HOSP IP/OBS HIGH 50: CPT | Performed by: INTERNAL MEDICINE

## 2025-05-02 PROCEDURE — 82948 REAGENT STRIP/BLOOD GLUCOSE: CPT

## 2025-05-02 PROCEDURE — 84100 ASSAY OF PHOSPHORUS: CPT | Performed by: INTERNAL MEDICINE

## 2025-05-02 PROCEDURE — 85018 HEMOGLOBIN: CPT | Performed by: FAMILY MEDICINE

## 2025-05-02 PROCEDURE — 99024 POSTOP FOLLOW-UP VISIT: CPT | Performed by: SPECIALIST

## 2025-05-02 PROCEDURE — 80048 BASIC METABOLIC PNL TOTAL CA: CPT | Performed by: PHYSICIAN ASSISTANT

## 2025-05-02 PROCEDURE — 85014 HEMATOCRIT: CPT | Performed by: FAMILY MEDICINE

## 2025-05-02 PROCEDURE — 99232 SBSQ HOSP IP/OBS MODERATE 35: CPT | Performed by: FAMILY MEDICINE

## 2025-05-02 RX ORDER — CALCIUM CARBONATE 500 MG/1
500 TABLET, CHEWABLE ORAL 3 TIMES DAILY PRN
Status: DISCONTINUED | OUTPATIENT
Start: 2025-05-02 | End: 2025-05-06 | Stop reason: HOSPADM

## 2025-05-02 RX ORDER — HEPARIN SODIUM 1000 [USP'U]/ML
3000 INJECTION, SOLUTION INTRAVENOUS; SUBCUTANEOUS
Status: COMPLETED | OUTPATIENT
Start: 2025-05-02 | End: 2025-05-02

## 2025-05-02 RX ORDER — HYDROMORPHONE HCL/PF 1 MG/ML
0.5 SYRINGE (ML) INJECTION ONCE
Refills: 0 | Status: DISCONTINUED | OUTPATIENT
Start: 2025-05-02 | End: 2025-05-06 | Stop reason: HOSPADM

## 2025-05-02 RX ORDER — HYDROMORPHONE HCL/PF 1 MG/ML
0.75 SYRINGE (ML) INJECTION
Status: DISCONTINUED | OUTPATIENT
Start: 2025-05-02 | End: 2025-05-06 | Stop reason: HOSPADM

## 2025-05-02 RX ORDER — CARVEDILOL 3.12 MG/1
3.12 TABLET ORAL 2 TIMES DAILY WITH MEALS
Status: DISCONTINUED | OUTPATIENT
Start: 2025-05-02 | End: 2025-05-06 | Stop reason: HOSPADM

## 2025-05-02 RX ADMIN — DOCUSATE SODIUM 100 MG: 100 CAPSULE, LIQUID FILLED ORAL at 17:19

## 2025-05-02 RX ADMIN — SEVELAMER HYDROCHLORIDE 800 MG: 800 TABLET ORAL at 15:45

## 2025-05-02 RX ADMIN — CHOLECALCIFEROL TAB 25 MCG (1000 UNIT) 2000 UNITS: 25 TAB at 12:29

## 2025-05-02 RX ADMIN — SEVELAMER HYDROCHLORIDE 800 MG: 800 TABLET ORAL at 12:37

## 2025-05-02 RX ADMIN — ONDANSETRON 4 MG: 2 INJECTION INTRAMUSCULAR; INTRAVENOUS at 13:58

## 2025-05-02 RX ADMIN — CALCIUM ACETATE 1334 MG: 667 CAPSULE ORAL at 12:29

## 2025-05-02 RX ADMIN — DOCUSATE SODIUM 100 MG: 100 CAPSULE, LIQUID FILLED ORAL at 12:29

## 2025-05-02 RX ADMIN — ISOSORBIDE MONONITRATE 60 MG: 60 TABLET, EXTENDED RELEASE ORAL at 12:29

## 2025-05-02 RX ADMIN — HEPARIN SODIUM 7500 UNITS: 5000 INJECTION INTRAVENOUS; SUBCUTANEOUS at 21:21

## 2025-05-02 RX ADMIN — HEPARIN SODIUM 7500 UNITS: 5000 INJECTION INTRAVENOUS; SUBCUTANEOUS at 13:18

## 2025-05-02 RX ADMIN — ASPIRIN 81 MG: 81 TABLET, CHEWABLE ORAL at 12:29

## 2025-05-02 RX ADMIN — CALCIUM ACETATE 1334 MG: 667 CAPSULE ORAL at 20:51

## 2025-05-02 RX ADMIN — OXYCODONE HYDROCHLORIDE 10 MG: 10 TABLET ORAL at 15:39

## 2025-05-02 RX ADMIN — OXYCODONE HYDROCHLORIDE 5 MG: 5 TABLET ORAL at 08:13

## 2025-05-02 RX ADMIN — TORSEMIDE 100 MG: 100 TABLET ORAL at 12:29

## 2025-05-02 RX ADMIN — HYDROMORPHONE HYDROCHLORIDE 0.5 MG: 1 INJECTION, SOLUTION INTRAMUSCULAR; INTRAVENOUS; SUBCUTANEOUS at 03:19

## 2025-05-02 RX ADMIN — OXYCODONE HYDROCHLORIDE 10 MG: 10 TABLET ORAL at 19:41

## 2025-05-02 RX ADMIN — ANTACID TABLETS 500 MG: 500 TABLET, CHEWABLE ORAL at 23:51

## 2025-05-02 RX ADMIN — HEPARIN SODIUM 3000 UNITS: 1000 INJECTION, SOLUTION INTRAVENOUS; SUBCUTANEOUS at 08:56

## 2025-05-02 RX ADMIN — HYDROMORPHONE HYDROCHLORIDE 0.5 MG: 1 INJECTION, SOLUTION INTRAMUSCULAR; INTRAVENOUS; SUBCUTANEOUS at 07:26

## 2025-05-02 RX ADMIN — HYDROMORPHONE HYDROCHLORIDE 0.75 MG: 1 INJECTION, SOLUTION INTRAMUSCULAR; INTRAVENOUS; SUBCUTANEOUS at 17:19

## 2025-05-02 RX ADMIN — NYSTATIN: 100000 POWDER TOPICAL at 12:37

## 2025-05-02 RX ADMIN — AMPICILLIN SODIUM AND SULBACTAM SODIUM 3 G: 2; 1 INJECTION, POWDER, FOR SOLUTION INTRAMUSCULAR; INTRAVENOUS at 15:49

## 2025-05-02 RX ADMIN — Medication 1 CAPSULE: at 15:45

## 2025-05-02 RX ADMIN — HEPARIN SODIUM 7500 UNITS: 5000 INJECTION INTRAVENOUS; SUBCUTANEOUS at 05:49

## 2025-05-02 RX ADMIN — CARVEDILOL 3.12 MG: 3.12 TABLET, FILM COATED ORAL at 15:45

## 2025-05-02 RX ADMIN — OXYCODONE HYDROCHLORIDE 10 MG: 10 TABLET ORAL at 12:36

## 2025-05-02 RX ADMIN — SEVELAMER HYDROCHLORIDE 800 MG: 800 TABLET ORAL at 07:26

## 2025-05-02 RX ADMIN — ATORVASTATIN CALCIUM 80 MG: 80 TABLET, FILM COATED ORAL at 12:29

## 2025-05-02 RX ADMIN — OXYCODONE HYDROCHLORIDE 10 MG: 10 TABLET ORAL at 02:50

## 2025-05-02 RX ADMIN — INSULIN LISPRO 2 UNITS: 100 INJECTION, SOLUTION INTRAVENOUS; SUBCUTANEOUS at 16:14

## 2025-05-02 RX ADMIN — OXYCODONE HYDROCHLORIDE 10 MG: 10 TABLET ORAL at 06:43

## 2025-05-02 RX ADMIN — CARVEDILOL 6.25 MG: 6.25 TABLET, FILM COATED ORAL at 07:26

## 2025-05-02 RX ADMIN — HYDROMORPHONE HYDROCHLORIDE 0.75 MG: 1 INJECTION, SOLUTION INTRAMUSCULAR; INTRAVENOUS; SUBCUTANEOUS at 13:19

## 2025-05-02 RX ADMIN — CALCITRIOL CAPSULES 0.25 MCG 0.75 MCG: 0.25 CAPSULE ORAL at 12:28

## 2025-05-02 RX ADMIN — CALCIUM ACETATE 1334 MG: 667 CAPSULE ORAL at 15:45

## 2025-05-02 RX ADMIN — OXYCODONE HYDROCHLORIDE 10 MG: 10 TABLET ORAL at 23:51

## 2025-05-02 RX ADMIN — HYDROMORPHONE HYDROCHLORIDE 0.75 MG: 1 INJECTION, SOLUTION INTRAMUSCULAR; INTRAVENOUS; SUBCUTANEOUS at 20:51

## 2025-05-02 RX ADMIN — NYSTATIN: 100000 POWDER TOPICAL at 17:19

## 2025-05-02 NOTE — ASSESSMENT & PLAN NOTE
Lab Results   Component Value Date    HGBA1C 7.2 (H) 04/30/2025       Recent Labs     05/01/25  1048 05/01/25  1610 05/01/25 2015 05/02/25  0725   POCGLU 104 138 176* 140       Blood Sugar Average: Last 72 hrs:  (P) [id number]  -Continue management per primary team

## 2025-05-02 NOTE — PROGRESS NOTES
Progress Note - Hospitalist   Name: Joaquin Frankel 44 y.o. male I MRN: [id number]  Unit/Bed#: 2 [address] Date of Admission: 4/28/2025   Date of Service: 5/2/2025 I Hospital Day: 2    Assessment & Plan  Abscess of right groin  Patient presented to ED with pain and swelling of right groin/scrotum. Reported pimple on right scrotum that ruptured with large amount of white drainage.   CT a/p showed extensive subcutaneous inflammatory stranding throughout the right groin, ventral lower pelvic wall, bilateral scrotal sacs suggestive of cellulitis/fasciitis or superficial infection; no findings of necrotizing fasciitis or Santhosh's gangrene; no abscess was noted  Lactate negative, procal 0.86 on admission  s/p I&D in OR 4/30 and found to have large right groin abscess  Wound Cultures with growth of group C beta-hemolytic strep and Prevotella Bivia  Off IV vancomycin  Leukocytosis resolved.  Fever curve is improving with Tmax of 100.5 over the last 24 hours  Sepsis (HCC)  Not present on admission as noted by fever and leukocytosis in setting of R groin abscess  Lactate WNL, procal 0.86 on admission  Blood cultures negative so far  Currently on Unasyn  ESRD (end stage renal disease) on dialysis (HCC)  Lab Results   Component Value Date    EGFR 6 05/02/2025    EGFR 4 05/01/2025    EGFR 5 04/30/2025    CREATININE 9.18 (H) 05/02/2025    CREATININE 11.35 (H) 05/01/2025    CREATININE 10.38 (H) 04/30/2025     On HD on Monday Wednesday Thursday Friday  Nephrology following, input appreciated  Continue HD as per nephrology  Continue Phoslo, nephrocaps, torsemide  Lab holiday in a.m.  FR 1800  Anemia of chronic disease  Hgb of 6 earlier this morning is erroneous as repeat H/H was 8.3  Receives Venofer and Mircera outpatient  No signs or symptoms of bleeding  Repeat lab work in a.m.   hx of Pulmonary embolism (HCC)  Completed treatment with warfarin.  Primary hypertension  Continue nifedipine, torsemide  Coreg decreased to 3.125  mg twice a day as blood pressures on the lower side at times  Type 2 diabetes mellitus (HCC)  Lab Results   Component Value Date    HGBA1C 7.2 (H) 04/30/2025       Recent Labs     05/01/25  1048 05/01/25  1610 05/01/25 2015 05/02/25  0725   POCGLU 104 138 176* 140     Not currently on medication  Continue SSI, Monitor blood glucose.  Hyponatremia  Sodium level 133.  Continue fluid restriction   Management with dialysis  Hyperphosphatemia  Continue PhosLo, sevelamer  Metabolic acidosis  BiCarb level improved with higher bicarb bath on HD per nephrology  Morbid obesity (HCC)  Body mass index is 57.02 kg/m².   Patient would benefit from dietary and lifestyle changes    VTE Pharmacologic Prophylaxis:    Heparin    Mobility:   Basic Mobility Inpatient Raw Score: 22  JH-HLM Goal: 7: Walk 25 feet or more  JH-HLM Achieved: 3: Sit at edge of bed  JH-HLM Goal NOT achieved. Continue with multidisciplinary rounding and encourage appropriate mobility to improve upon JH-HLM goals.    Patient Centered Rounds: I performed bedside rounds with nursing staff today.   Discussions with Specialists or Other Care Team Provider: Yes - surgery, nephrology    Education and Discussions with Family / Patient: Yes    Current Length of Stay: 2 day(s)  Current Patient Status: Inpatient   Certification Statement: The patient will continue to require additional inpatient hospital stay due to right groin abscess requiring monitoring  Discharge Plan: Anticipate discharge in 48-72 hrs to home.    Code Status: Level 1 - Full Code    Subjective   Patient reports pain along the groin area and states that the current regimen is not keeping the pain under control.  Also requesting one-time dose of IV pain medication after dialysis    Objective :  Temp:  [97.7 °F (36.5 °C)-100.5 °F (38.1 °C)] 99.7 °F (37.6 °C)  HR:  [75-92] 84  BP: ()/(34-71) 100/65  Resp:  [18-19] 18  SpO2:  [72 %-97 %] 93 %    Body mass index is 57.02 kg/m².     Input and Output  Summary (last 24 hours):     Intake/Output Summary (Last 24 hours) at 5/2/2025 0996  Last data filed at 5/2/2025 0830  Gross per 24 hour   Intake 700 ml   Output 4495 ml   Net -3795 ml       Physical Exam  Vitals reviewed.   Constitutional:       General: He is not in acute distress.     Appearance: He is not toxic-appearing.   HENT:      Head: Normocephalic and atraumatic.   Eyes:      General:         Right eye: No discharge.         Left eye: No discharge.   Cardiovascular:      Rate and Rhythm: Normal rate and regular rhythm.   Pulmonary:      Effort: Pulmonary effort is normal. No respiratory distress.      Breath sounds: Normal breath sounds. No wheezing or rales.   Abdominal:      General: Bowel sounds are normal. There is no distension.      Palpations: Abdomen is soft.      Tenderness: There is no abdominal tenderness.   Musculoskeletal:      Right lower leg: No edema.      Comments: Chronic left lower extremity lymphedema   Skin:     Comments: Right groin area with dressing in place   Neurological:      Mental Status: He is alert and oriented to person, place, and time.         Lines/Drains:  Lines/Drains/Airways       Active Status       Name Placement date Placement time Site Days    HD Permanent Double Catheter 02/05/25  1609  Internal jugular  85                            Lab Results: I have reviewed the following results:   Results from last 7 days   Lab Units 05/02/25  0854 05/02/25 0544 04/29/25 0527 04/28/25 2024   WBC Thousand/uL  --  8.99   < > 10.66*   HEMOGLOBIN g/dL 8.3* 6.0*   < > 9.3*   HEMATOCRIT % 24.8* 18.4*   < > 28.0*   PLATELETS Thousands/uL  --  231   < > 336   LYMPHO PCT %  --   --   --  13*   MONO PCT %  --   --   --  3*   EOS PCT %  --   --   --  4    < > = values in this interval not displayed.     Results from last 7 days   Lab Units 05/02/25  0544 04/29/25 0527 04/28/25 2024   SODIUM mmol/L 133*   < > 134*   POTASSIUM mmol/L 4.0   < > 4.0   CHLORIDE mmol/L 89*   < > 92*    CO2 mmol/L 25   < > 23   BUN mg/dL 49*   < > 50*   CREATININE mg/dL 9.18*   < > 7.91*   ANION GAP mmol/L 19*   < > 19*   CALCIUM mg/dL 8.9   < > 9.1   ALBUMIN g/dL  --   --  3.6   TOTAL BILIRUBIN mg/dL  --   --  0.24   ALK PHOS U/L  --   --  84   ALT U/L  --   --  15   AST U/L  --   --  12*   GLUCOSE RANDOM mg/dL 140   < > 169*    < > = values in this interval not displayed.     Results from last 7 days   Lab Units 04/28/25  2219   INR  1.08     Results from last 7 days   Lab Units 05/02/25  0725 05/01/25  2015 05/01/25  1610 05/01/25  1048 05/01/25  0657 04/30/25  2041 04/30/25  1537 04/30/25  1416 04/30/25  1054 04/30/25  0711 04/29/25  2035 04/29/25  1552   POC GLUCOSE mg/dl 140 176* 138 104 85 90 93 102 117 138 142* 135     Results from last 7 days   Lab Units 04/30/25  0617   HEMOGLOBIN A1C % 7.2*     Results from last 7 days   Lab Units 04/28/25  2317 04/28/25  2219   LACTIC ACID mmol/L  --  0.7   PROCALCITONIN ng/ml 0.86*  --        Recent Cultures (last 7 days):   Results from last 7 days   Lab Units 04/29/25  1357 04/28/25  2221 04/28/25  2219   BLOOD CULTURE   --  No Growth at 48 hrs. No Growth at 48 hrs.   GRAM STAIN RESULT  No Polys or Bacteria seen  --   --    WOUND CULTURE  3+ Growth of Beta Hemolytic Streptococcus Group C*  --   --        Imaging Results Review: No pertinent imaging studies reviewed.  Other Study Results Review: No additional pertinent studies reviewed.    Last 24 Hours Medication List:     Current Facility-Administered Medications:     acetaminophen (TYLENOL) tablet 650 mg, Q6H PRN    ampicillin-sulbactam (UNASYN) 3 g in sodium chloride 0.9 % 100 mL IVPB, Q24H, Last Rate: 3 g (05/01/25 1652)    aspirin chewable tablet 81 mg, Daily    atorvastatin (LIPITOR) tablet 80 mg, Daily    calcitriol (ROCALTROL) capsule 0.75 mcg, Once per day on Monday Wednesday Friday    calcium acetate (PHOSLO) capsule 1,334 mg, TID    carvedilol (COREG) tablet 3.125 mg, BID With Meals    Cholecalciferol  (VITAMIN D3) tablet 2,000 Units, Daily    docusate sodium (COLACE) capsule 100 mg, BID    heparin (porcine) subcutaneous injection 7,500 Units, Q8H SHAZIA    HYDROmorphone (DILAUDID) injection 0.5 mg, Q3H PRN    insulin lispro (HumALOG/ADMELOG) 100 units/mL subcutaneous injection 2-12 Units, TID AC **AND** Fingerstick Glucose (POCT), TID AC    isosorbide mononitrate (IMDUR) 24 hr tablet 60 mg, Daily    naloxone (NARCAN) 0.04 mg/mL syringe 0.04 mg, Q1MIN PRN    NIFEdipine (PROCARDIA XL) 24 hr tablet 30 mg, HS    nystatin (MYCOSTATIN) powder, BID    ondansetron (ZOFRAN) injection 4 mg, Q6H PRN    oxyCODONE (ROXICODONE) IR tablet 5 mg, Q4H PRN **OR** oxyCODONE (ROXICODONE) immediate release tablet 10 mg, Q4H PRN    sevelamer (RENAGEL) tablet 800 mg, TID With Meals    torsemide (DEMADEX) tablet 100 mg, Daily    vit B complex-vit C-folic acid (Renal Caps) capsule 1 capsule, Daily With Dinner    Administrative Statements   Today, Patient Was Seen By: Wanda Navarro DO      **Please Note: This note may have been constructed using a voice recognition system.**

## 2025-05-02 NOTE — ASSESSMENT & PLAN NOTE
-Blood pressure stable and is at goal and occasionally on the lower side.  Continue current antihypertensive medications-but decreased Coreg to 3.125 mg twice daily.  Continue nifedipine 30 mg daily, torsemide 100 mg daily.  As per the [facility] records Coreg was not listed  -> As per outpatient [facility] record also on metolazone 10 mg 2 times a week, discussed with patient, he takes metolazone on the weekends.

## 2025-05-02 NOTE — PLAN OF CARE
Problem: PAIN - ADULT  Goal: Verbalizes/displays adequate comfort level or baseline comfort level  Description: Interventions:- Encourage patient to monitor pain and request assistance- Assess pain using appropriate pain scale- Administer analgesics based on type and severity of pain and evaluate response- Implement non-pharmacological measures as appropriate and evaluate response- Consider cultural and social influences on pain and pain management- Notify physician/advanced practitioner if interventions unsuccessful or patient reports new pain  Outcome: Progressing     Problem: INFECTION - ADULT  Goal: Absence or prevention of progression during hospitalization  Description: INTERVENTIONS:- Assess and monitor for signs and symptoms of infection- Monitor lab/diagnostic results- Monitor all insertion sites, i.e. indwelling lines, tubes, and drains- Monitor endotracheal if appropriate and nasal secretions for changes in amount and color- [place] appropriate cooling/warming therapies per order- Administer medications as ordered- Instruct and encourage patient and family to use good hand hygiene technique- Identify and instruct in appropriate isolation precautions for identified infection/condition  Outcome: Progressing  Goal: Absence of fever/infection during neutropenic period  Description: INTERVENTIONS:- Monitor WBC  Outcome: Progressing     Problem: SAFETY ADULT  Goal: Patient will remain free of falls  Description: INTERVENTIONS:- Educate patient/family on patient safety including physical limitations- Instruct patient to call for assistance with activity - Consult OT/PT to assist with strengthening/mobility - Keep Call bell within reach- Keep bed low and locked with side rails adjusted as appropriate- Keep care items and personal belongings within reach- Initiate and maintain comfort rounds- Make Fall Risk Sign visible to staff- Offer Toileting every 2 Hours, in advance of need- Initiate/Maintain bed alarm-  Obtain necessary fall risk management equipment: alarm- Apply yellow socks and bracelet for high fall risk patients- Consider moving patient to room near nurses station  Outcome: Progressing  Goal: Maintain or return to baseline ADL function  Description: INTERVENTIONS:-  Assess patient's ability to carry out ADLs; assess patient's baseline for ADL function and identify physical deficits which impact ability to perform ADLs (bathing, care of mouth/teeth, toileting, grooming, dressing, etc.)- Assess/evaluate cause of self-care deficits - Assess range of motion- Assess patient's mobility; develop plan if impaired- Assess patient's need for assistive devices and provide as appropriate- Encourage maximum independence but intervene and supervise when necessary- Involve family in performance of ADLs- Assess for home care needs following discharge - Consider OT consult to assist with ADL evaluation and planning for discharge- Provide patient education as appropriate  Outcome: Progressing  Goal: Maintains/Returns to pre admission functional level  Description: INTERVENTIONS:- Perform AM-PAC 6 Click Basic Mobility/ Daily Activity assessment daily.- Set and communicate daily mobility goal to care team and patient/family/caregiver. - Collaborate with rehabilitation services on mobility goals if consulted- Perform Range of Motion 3 times a day.- Reposition patient every 2 hours.- Dangle patient 3 times a day- Stand patient 3 times a day- Ambulate patient 3 times a day- Out of bed to chair 3 times a day - Out of bed for meals 3 times a day- Out of bed for toileting- Record patient progress and toleration of activity level   Outcome: Progressing     Problem: DISCHARGE PLANNING  Goal: Discharge to home or other facility with appropriate resources  Description: INTERVENTIONS:- Identify barriers to discharge w/patient and caregiver- Arrange for needed discharge resources and transportation as appropriate- Identify discharge learning  needs (meds, wound care, etc.)- Arrange for interpretive services to assist at discharge as needed- Refer to Case Management Department for coordinating discharge planning if the patient needs post-hospital services based on physician/advanced practitioner order or complex needs related to functional status, cognitive ability, or social support system  Outcome: Progressing     Problem: Knowledge Deficit  Goal: Patient/family/caregiver demonstrates understanding of disease process, treatment plan, medications, and discharge instructions  Description: Complete learning assessment and assess knowledge base.Interventions:- Provide teaching at level of understanding- Provide teaching via preferred learning methods  Outcome: Progressing     Problem: METABOLIC, FLUID AND ELECTROLYTES - ADULT  Goal: Electrolytes maintained within normal limits  Description: INTERVENTIONS:- Monitor labs and assess patient for signs and symptoms of electrolyte imbalances- Administer electrolyte replacement as ordered- Monitor response to electrolyte replacements, including repeat lab results as appropriate- Instruct patient on fluid and nutrition as appropriate  Outcome: Progressing  Goal: Fluid balance maintained  Description: INTERVENTIONS:- Monitor labs - Monitor I/O and WT- Instruct patient on fluid and nutrition as appropriate- Assess for signs & symptoms of volume excess or deficit  Outcome: Progressing

## 2025-05-02 NOTE — ASSESSMENT & PLAN NOTE
Lab Results   Component Value Date    HGBA1C 7.2 (H) 04/30/2025       Recent Labs     05/01/25  1048 05/01/25  1610 05/01/25 2015 05/02/25  0725   POCGLU 104 138 176* 140     Not currently on medication  Continue SSI, Monitor blood glucose.

## 2025-05-02 NOTE — ASSESSMENT & PLAN NOTE
Lab Results   Component Value Date    EGFR 6 05/02/2025    EGFR 4 05/01/2025    EGFR 5 04/30/2025    CREATININE 9.18 (H) 05/02/2025    CREATININE 11.35 (H) 05/01/2025    CREATININE 10.38 (H) 04/30/2025   Receiving supplemental dialysis 3-4 times weekly  Nephrology following   Dialysis yesterday removed 4.5L fluid   Fluid restriction

## 2025-05-02 NOTE — ASSESSMENT & PLAN NOTE
Lab Results   Component Value Date    HGBA1C 7.2 (H) 04/30/2025   Recent hemoglobin A1c 7.2%     Tight glucose control - Humalog    Recent Labs     05/01/25  0657 05/01/25  1048 05/01/25  1610 05/01/25 2015   POCGLU 85 104 138 176*       Blood Sugar Average: Last 72 hrs:  (P) [id number]

## 2025-05-02 NOTE — ASSESSMENT & PLAN NOTE
-Hemoglobin below target-dropped to 6.0 g/dL, recommend PRBC.  no JOSSELYN due to past history of PE.  Continue to monitor hemoglobin   -as outpatient on Venofer 100 mg weekly.  Also on Mircera 60 mcg every 2 weeks.  Would consider inpatient JOSSELYN if prolonged hospital stay

## 2025-05-02 NOTE — ASSESSMENT & PLAN NOTE
POD#2 I&D right groin abscess   Patient intermittently febrile last night Tmax of 100.5. Resolved with Tylenol.   WBC count normalized since 2 days ago 12.69 > 7.68 > 8.99  Hemoglobin dropping 9.1 > 8.1 > 6.0> 8.3 this morning. Baseline 9.0   Reported 1 instance of bleeding from site 2 nights ago. Packing fell out last night and was not replaced but bandaging was reapplied.     Vancomycin D/C'd for Unasyn  Dressing changed today and re-packed  Continue daily dressing changes.

## 2025-05-02 NOTE — ASSESSMENT & PLAN NOTE
Continue nifedipine, torsemide  Coreg decreased to 3.125 mg twice a day as blood pressures on the lower side at times

## 2025-05-02 NOTE — ASSESSMENT & PLAN NOTE
-Monitor phosphorus level.  Continue binders-PhosLo and sevelamer currently.    -Secondary hyperparathyroidism of renal origin, continue calcitriol 0.75 mcg 3 times a week

## 2025-05-02 NOTE — ASSESSMENT & PLAN NOTE
Lab Results   Component Value Date    EGFR 6 05/02/2025    EGFR 4 05/01/2025    EGFR 5 04/30/2025    CREATININE 9.18 (H) 05/02/2025    CREATININE 11.35 (H) 05/01/2025    CREATININE 10.38 (H) 04/30/2025     On HD on Monday Wednesday Thursday Friday  Nephrology following, input appreciated  Continue HD as per nephrology  Continue Phoslo, nephrocaps, torsemide  Lab holiday in a.m.  FR 1800

## 2025-05-02 NOTE — ASSESSMENT & PLAN NOTE
Likely due to end-stage renal disease  Baseline hemoglobin 9.0. trending down from 9.1 > 8.1 > 6.0>8.3  Asymptomatic without dizziness or lightheadedness

## 2025-05-02 NOTE — ASSESSMENT & PLAN NOTE
- Sodium level was low at 133 meq/L.  Due to decreased free water excretion, recommend fluid restriction.  Continue UF with HD

## 2025-05-02 NOTE — ASSESSMENT & PLAN NOTE
-Results of CT reviewed, cellulitis right groin as per CT report.     -.  Seen by surgery, status post I&D on 4/30.  Continue management per surgical team.  Also on IV antibiotics

## 2025-05-02 NOTE — PROGRESS NOTES
Progress Note - Nephrology   Name: Joaquin Frankel 44 y.o. male I MRN: [id number]  Unit/Bed#: 2 [place] [address] I Date of Admission: 4/28/2025   Date of Service: 5/2/2025 I Hospital Day: 2    Assessment & Plan  ESRD (end stage renal disease) on dialysis ([facility])  Lab Results   Component Value Date    EGFR 6 05/02/2025    EGFR 4 05/01/2025    EGFR 5 04/30/2025    CREATININE 9.18 (H) 05/02/2025    CREATININE 11.35 (H) 05/01/2025    CREATININE 10.38 (H) 04/30/2025   End-stage renal disease on dialysis  -Outpatient unit: [facility]  -Schedule: Likely Monday Wednesday Thursday and Friday  -Access: Right IJ PermCath.  Previous attempt at fistula not successful  -Dry weight 168 kg  -Plan:   - Required Cathflo on 4/30.  Received only 30 minutes of dialysis on 4/30.  Clinically appears euvolemic, status post hemodialysis treatment on 5/1.  Had issues with blood flow intermittently and was given heparin during the treatment.  Plan for intermittent HD treatment today per schedule.  Clinically volume status acceptable.  Plan for ultrafiltration 2.5 to 3 kg as tolerated to get him down to his target weight of 168 kg  - Order for heparin IV 3000 units to be given during dialysis treatment, discussed with dialysis nurse, recommended 3K bath     Primary hypertension  -Blood pressure stable and is at goal and occasionally on the lower side.  Continue current antihypertensive medications-but decreased Coreg to 3.125 mg twice daily.  Continue nifedipine 30 mg daily, torsemide 100 mg daily.  As per the [facility] records Coreg was not listed  -> As per outpatient [facility] record also on metolazone 10 mg 2 times a week, discussed with patient, he takes metolazone on the weekends.  Anemia of chronic disease  -Hemoglobin below target-dropped to 6.0 g/dL, recommend PRBC.  no JOSSELYN due to past history of PE.  Continue to monitor hemoglobin   -as outpatient on Venofer 100 mg weekly.  Also on Mircera 60 mcg every 2 weeks.  Would consider inpatient JOSSELYN  if prolonged hospital stay  Hyperphosphatemia  -Monitor phosphorus level.  Continue binders-PhosLo and sevelamer currently.    -Secondary hyperparathyroidism of renal origin, continue calcitriol 0.75 mcg 3 times a week  Hyponatremia  - Sodium level was low at 133 meq/L.  Due to decreased free water excretion, recommend fluid restriction.  Continue UF with HD  Metabolic acidosis  -Bicarb level improved to 25 with higher bicarb bath, continue to monitor.  Abscess of right groin  -Results of CT reviewed, cellulitis right groin as per CT report.     -.  Seen by surgery, status post I&D on 4/30.  Continue management per surgical team.  Also on IV antibiotics  hx of Pulmonary embolism (HCC)  -Continue anticoagulation per primary team  Type 2 diabetes mellitus (HCC)  Lab Results   Component Value Date    HGBA1C 7.2 (H) 04/30/2025       Recent Labs     05/01/25  1048 05/01/25  1610 05/01/25 2015 05/02/25  0725   POCGLU 104 138 176* 140       Blood Sugar Average: Last 72 hrs:  (P) 129.3114121883579382  -Continue management per primary team    I have reviewed the nephrology recommendations including plan for hemodialysis treatment today and plan to decrease the dose of carvedilol, also recommended PRBC, with primary team, and we are in agreement with renal plan including the information outlined above.     Subjective   Complaining of pain in the right groin area.  No shortness of breath    Objective :  Temp:  [97.7 °F (36.5 °C)-100.5 °F (38.1 °C)] 97.7 °F (36.5 °C)  HR:  [75-92] 84  BP: ()/(34-71) 113/64  Resp:  [18-19] 18  SpO2:  [72 %-97 %] 93 %    Current Weight: Weight - Scale: (!) 170 kg (375 lb)  First Weight: Weight - Scale: (!) 171 kg (376 lb)  I/O         04/30 0701 05/01 0700 05/01 0701 05/02 0700 05/02 0701 05/03 0700    I.V. (mL/kg) 800 (4.7) 500 (2.9) 200 (1.2)    Other 400      Total Intake(mL/kg) 1200 (7.1) 500 (2.9) 200 (1.2)    Other  4495     Total Output  4495     Net +1200 -3995 +200                  Physical Exam   General:  Ill looking, awake.  Eyes: Conjunctivae pink,  Sclera anicteric  ENT: lips and mucous membranes moist  Neck: supple   Chest: Clear to Auscultation both lungs,  no crackles, ronchus or wheezing.  CVS: S1 & S2 present, normal rate, regular rhythm, no murmur.  Abdomen: soft, non-tender, non-distended, Bowel sounds normoactive  Extremities: Left leg lymphedema  Skin: no rash  Neuro: awake, alert, oriented x 3   Psych: Mood and affect appropriate    Medications:    Current Facility-Administered Medications:     acetaminophen (TYLENOL) tablet 650 mg, 650 mg, Oral, Q6H PRN, Tim Humphrey PA-C, 650 mg at 05/01/25 1650    ampicillin-sulbactam (UNASYN) 3 g in sodium chloride 0.9 % 100 mL IVPB, 3 g, Intravenous, Q24H, Wanda Navarro DO, Last Rate: 200 mL/hr at 05/01/25 1652, 3 g at 05/01/25 1652    aspirin chewable tablet 81 mg, 81 mg, Oral, Daily, Tim Humphrey PA-C, 81 mg at 05/01/25 1434    atorvastatin (LIPITOR) tablet 80 mg, 80 mg, Oral, Daily, Tim Humphrey PA-C, 80 mg at 05/01/25 1434    calcitriol (ROCALTROL) capsule 0.75 mcg, 0.75 mcg, Oral, Once per day on Monday Wednesday Friday, Tim Humphrey PA-C, 0.75 mcg at 04/30/25 1412    calcium acetate (PHOSLO) capsule 1,334 mg, 1,334 mg, Oral, TID, Tim Humphrey PA-C, 1,334 mg at 05/01/25 2107    carvedilol (COREG) tablet 6.25 mg, 6.25 mg, Oral, BID With Meals, Tim Humphrey PA-C, 6.25 mg at 05/02/25 0726    Cholecalciferol (VITAMIN D3) tablet 2,000 Units, 2,000 Units, Oral, Daily, Tim Humphrey PA-C, 2,000 Units at 05/01/25 1434    docusate sodium (COLACE) capsule 100 mg, 100 mg, Oral, BID, Tim Humphrey PA-C, 100 mg at 05/01/25 1808    heparin (porcine) injection 3,000 Units, 3,000 Units, Intravenous, Once in dialysis, Johnny Viramontes MD    heparin (porcine) subcutaneous injection 7,500 Units, 7,500 Units, Subcutaneous, Q8H [facility], Tim Humphrey PA-C, 7,500 Units at 05/02/25 0549    HYDROmorphone (DILAUDID) injection 0.5 mg, 0.5 mg, Intravenous,  Q3H PRN, Tim Humphrey PA-C, 0.5 mg at 05/02/25 0726    insulin lispro (HumALOG/ADMELOG) 100 units/mL subcutaneous injection 2-12 Units, 2-12 Units, Subcutaneous, TID AC, 2 Units at 04/29/25 1204 **AND** Fingerstick Glucose (POCT), , , TID AC, Tim Humphrey PA-C    isosorbide mononitrate (IMDUR) 24 hr tablet 60 mg, 60 mg, Oral, Daily, Tim Humphrey PA-C, 60 mg at 05/01/25 1441    naloxone (NARCAN) 0.04 mg/mL syringe 0.04 mg, 0.04 mg, Intravenous, Q1MIN PRN, Tim Humphrey PA-C    NIFEdipine (PROCARDIA XL) 24 hr tablet 30 mg, 30 mg, Oral, HS, Tim Humphrey PA-C, 30 mg at 05/01/25 2107    nystatin (MYCOSTATIN) powder, , Topical, BID, Wanda Revankar, DO, Given at 05/01/25 1808    ondansetron (ZOFRAN) injection 4 mg, 4 mg, Intravenous, Q6H PRN, Tim Humphrey PA-C    oxyCODONE (ROXICODONE) IR tablet 5 mg, 5 mg, Oral, Q4H PRN, 5 mg at 05/02/25 0813 **OR** oxyCODONE (ROXICODONE) immediate release tablet 10 mg, 10 mg, Oral, Q4H PRN, Tim Humphrey PA-C, 10 mg at 05/02/25 0643    sevelamer (RENAGEL) tablet 800 mg, 800 mg, Oral, TID With Meals, Tim Humphrey PA-C, 800 mg at 05/02/25 0726    torsemide (DEMADEX) tablet 100 mg, 100 mg, Oral, Daily, Tim Humphrey PA-C, 100 mg at 05/01/25 1434    vit B complex-vit C-folic acid (Renal Caps) capsule 1 capsule, 1 mg, Oral, Daily With Dinner, Tim Humphrey PA-C, 1 capsule at 05/01/25 1629      Lab Results: I have reviewed the following results:  Results from last 7 days   Lab Units 05/02/25  0544 05/01/25  0430 04/30/25  0617 04/29/25  0527 04/28/25 2024   WBC Thousand/uL 8.99 7.68 12.69* 9.82 10.66*   HEMOGLOBIN g/dL 6.0* 8.1* 9.1* 9.4* 9.3*   HEMATOCRIT % 18.4* 25.3* 27.8* 28.9* 28.0*   PLATELETS Thousands/uL 231 294 361 352 336   POTASSIUM mmol/L 4.0 4.8 5.2 4.2 4.0   CHLORIDE mmol/L 89* 93* 94* 92* 92*   CO2 mmol/L 25 20* 20* 20* 23   BUN mg/dL 49* 74* 71* 57* 50*   CREATININE mg/dL 9.18* 11.35* 10.38* 8.67* 7.91*   CALCIUM mg/dL 8.9 8.8 9.4 8.9 9.1   PHOSPHORUS mg/dL 7.0*  --    "--   --   --    ALBUMIN g/dL  --   --   --   --  3.6       Administrative Statements     Portions of the record may have been created with voice recognition software. Occasional wrong word or \"sound a like\" substitutions may have occurred due to the inherent limitations of voice recognition software. Read the chart carefully and recognize, using context, where substitutions have occurred.If you have any questions, please contact the dictating provider.  "

## 2025-05-02 NOTE — ASSESSMENT & PLAN NOTE
Not present on admission as noted by fever and leukocytosis in setting of R groin abscess  Lactate WNL, procal 0.86 on admission  Blood cultures negative so far  Currently on Unasyn

## 2025-05-02 NOTE — PLAN OF CARE
Target UF Goal 3 L as tolerated. Patient dialyzing for 4 hours on 3 K bath for serum K of 4 per protocol. Treatment plan reviewed with Dr. Viramontes.   Problem: METABOLIC, FLUID AND ELECTROLYTES - ADULT  Goal: Electrolytes maintained within normal limits  Description: INTERVENTIONS:- Monitor labs and assess patient for signs and symptoms of electrolyte imbalances- Administer electrolyte replacement as ordered- Monitor response to electrolyte replacements, including repeat lab results as appropriate- Instruct patient on fluid and nutrition as appropriate  Outcome: Progressing  Goal: Fluid balance maintained  Description: INTERVENTIONS:- Monitor labs - Monitor I/O and WT- Instruct patient on fluid and nutrition as appropriate- Assess for signs & symptoms of volume excess or deficit  Outcome: Progressing      Post-Dialysis RN Treatment Note    Blood Pressure:  Pre 102/47 mm/Hg  Post 102/60 mmHg   EDW:  167.5 kg    Weight:  Pre 170.1kg   Post 166.9 kg   Mode of weight measurement: Standing Scale   Volume Removed:  3000 ml    Treatment duration: 240 minutes    NS given:  No    Treatment shortened No   Medications given during Rx: heparin 3000 units   Estimated Kt/V:  None Reported   Access type: Permacath/TDC   Needle Gauge:  na   Access Issues: Yes, describe: needed to reverse line at the last hour of treatment     Report called to primary nurse:   Yes Dulce Maria Son RN

## 2025-05-02 NOTE — ASSESSMENT & PLAN NOTE
Patient presented to ED with pain and swelling of right groin/scrotum. Reported pimple on right scrotum that ruptured with large amount of white drainage.   CT a/p showed extensive subcutaneous inflammatory stranding throughout the right groin, ventral lower pelvic wall, bilateral scrotal sacs suggestive of cellulitis/fasciitis or superficial infection; no findings of necrotizing fasciitis or Santhosh's gangrene; no abscess was noted  Lactate negative, procal 0.86 on admission  s/p I&D in OR 4/30 and found to have large right groin abscess  Wound Cultures with growth of group C beta-hemolytic strep and Prevotella Bivia  Off IV vancomycin  Leukocytosis resolved.  Fever curve is improving with Tmax of 100.5 over the last 24 hours

## 2025-05-02 NOTE — ASSESSMENT & PLAN NOTE
Hgb of 6 earlier this morning is erroneous as repeat H/H was 8.3  Receives Venofer and Mircera outpatient  No signs or symptoms of bleeding  Repeat lab work in a.m.

## 2025-05-02 NOTE — PROGRESS NOTES
Progress Note - Surgery-General   Name: Joaquin Frankel 44 y.o. male I MRN: [id number]  Unit/Bed#: 2 [place] [address] I Date of Admission: 4/28/2025   Date of Service: 5/2/2025 I Hospital Day: 2    Assessment & Plan  Abscess of right groin  POD#2 I&D right groin abscess   Patient intermittently febrile last night Tmax of 100.5. Resolved with Tylenol.   WBC count normalized since 2 days ago 12.69 > 7.68 > 8.99  Hemoglobin dropping 9.1 > 8.1 > 6.0> 8.3 this morning. Baseline 9.0   Reported 1 instance of bleeding from site 2 nights ago. Packing fell out last night and was not replaced but bandaging was reapplied.     Vancomycin D/C'd for Unasyn  Dressing changed today and re-packed  Continue daily dressing changes.      Morbid obesity (HCC)  Could be contributing factor patient currently has BMI 57, does appear the patient has lost some weight  Anemia of chronic disease  Likely due to end-stage renal disease  Baseline hemoglobin 9.0. trending down from 9.1 > 8.1 > 6.0>8.3  Asymptomatic without dizziness or lightheadedness   ESRD (end stage renal disease) on dialysis ([facility])  Lab Results   Component Value Date    EGFR 6 05/02/2025    EGFR 4 05/01/2025    EGFR 5 04/30/2025    CREATININE 9.18 (H) 05/02/2025    CREATININE 11.35 (H) 05/01/2025    CREATININE 10.38 (H) 04/30/2025   Receiving supplemental dialysis 3-4 times weekly  Nephrology following   Dialysis yesterday removed 4.5L fluid   Fluid restriction   Type 2 diabetes mellitus ([facility])  Lab Results   Component Value Date    HGBA1C 7.2 (H) 04/30/2025   Recent hemoglobin A1c 7.2%     Tight glucose control - Humalog    Recent Labs     05/01/25  0657 05/01/25  1048 05/01/25  1610 05/01/25 2015   POCGLU 85 104 138 176*       Blood Sugar Average: Last 72 hrs:  (P) 128.7275677857959831    Sepsis (HCC)  Resolved. Secondary to abscess        Subjective   Patient states wound packing fell out again last night and was not replaced. He is still experiencing fevers/chills and reports  "feeling \"winded\" last night upon ambulating to the restroom. He is having 9/10 tenderness still to the wound site. Denies lightheadedness, dizziness, chest pain, SOB at rest, pain elsewhere.     Objective :  Temp:  [98.1 °F (36.7 °C)-100.5 °F (38.1 °C)] 98.3 °F (36.8 °C)  HR:  [75-92] 92  BP: ()/(34-71) 115/68  Resp:  [18-19] 19  SpO2:  [72 %-97 %] 93 %    I/O         04/30 0701  05/01 0700 05/01 0701  05/02 0700 05/02 0701  05/03 0700    I.V. (mL/kg) 800 (4.7) 500 (2.9)     Other 400      Total Intake(mL/kg) 1200 (7.1) 500 (2.9)     Other  4495     Total Output  4495     Net +1200 -3995                  Lines/Drains/Airways       Active Status       Name Placement date Placement time Site Days    HD Permanent Double Catheter 02/05/25  1609  Internal jugular  85                  Physical Exam  Constitutional:       General: He is not in acute distress.     Appearance: He is obese. He is not ill-appearing, toxic-appearing or diaphoretic.   Eyes:      General: No scleral icterus.        Right eye: No discharge.         Left eye: No discharge.      Pupils: Pupils are equal, round, and reactive to light.   Cardiovascular:      Rate and Rhythm: Normal rate and regular rhythm.      Pulses: Normal pulses.      Heart sounds: Normal heart sounds. No murmur heard.     No friction rub. No gallop.   Pulmonary:      Effort: Pulmonary effort is normal. No respiratory distress.      Breath sounds: No stridor. No wheezing, rhonchi or rales.   Chest:      Chest wall: No tenderness.   Abdominal:      General: Bowel sounds are normal. There is distension (protuberant).      Palpations: There is no mass.      Tenderness: There is no abdominal tenderness. There is no guarding or rebound.   Genitourinary:     Comments: Swollen right testicle secondary to abscess       Musculoskeletal:         General: Normal range of motion.      Left lower leg: Edema (chronic lymphedema) present.   Skin:     General: Skin is warm and dry.      " Capillary Refill: Capillary refill takes less than 2 seconds.      Coloration: Skin is not pale.      Findings: No bruising, erythema, lesion or rash.             Comments: Abscess incision site at the right groin area still indurated and tender   Neurological:      Mental Status: He is alert.         Lab Results: I have reviewed the following results:  Recent Labs     05/02/25  0544   WBC 8.99   HGB 6.0*   HCT 18.4*      SODIUM 133*   K 4.0   CL 89*   CO2 25   BUN 49*   CREATININE 9.18*   GLUC 140   PHOS 7.0*         VTE Pharmacologic Prophylaxis: VTE covered by:  heparin (porcine), Subcutaneous, 7,500 Units at 05/02/25 0549     VTE Mechanical Prophylaxis: sequential compression device

## 2025-05-02 NOTE — ASSESSMENT & PLAN NOTE
Lab Results   Component Value Date    EGFR 6 05/02/2025    EGFR 4 05/01/2025    EGFR 5 04/30/2025    CREATININE 9.18 (H) 05/02/2025    CREATININE 11.35 (H) 05/01/2025    CREATININE 10.38 (H) 04/30/2025   End-stage renal disease on dialysis  -Outpatient unit: [facility]  -Schedule: Likely Monday Wednesday Thursday and Friday  -Access: Right IJ PermCath.  Previous attempt at fistula not successful  -Dry weight 168 kg  -Plan:   - Required Cathflo on 4/30.  Received only 30 minutes of dialysis on 4/30.  Clinically appears euvolemic, status post hemodialysis treatment on 5/1.  Had issues with blood flow intermittently and was given heparin during the treatment.  Plan for intermittent HD treatment today per schedule.  Clinically volume status acceptable.  Plan for ultrafiltration 2.5 to 3 kg as tolerated to get him down to his target weight of 168 kg  - Order for heparin IV 3000 units to be given during dialysis treatment, discussed with dialysis nurse, recommended 3K bath

## 2025-05-03 LAB
ANION GAP SERPL CALCULATED.3IONS-SCNC: 18 MMOL/L (ref 4–13)
ANISOCYTOSIS BLD QL SMEAR: PRESENT
BASOPHILS # BLD MANUAL: 0 THOUSAND/UL (ref 0–0.1)
BASOPHILS NFR MAR MANUAL: 0 % (ref 0–1)
BUN SERPL-MCNC: 36 MG/DL (ref 5–25)
CALCIUM SERPL-MCNC: 9.3 MG/DL (ref 8.4–10.2)
CHLORIDE SERPL-SCNC: 87 MMOL/L (ref 96–108)
CO2 SERPL-SCNC: 28 MMOL/L (ref 21–32)
CREAT SERPL-MCNC: 8.05 MG/DL (ref 0.6–1.3)
EOSINOPHIL # BLD MANUAL: 0.53 THOUSAND/UL (ref 0–0.4)
EOSINOPHIL NFR BLD MANUAL: 7 % (ref 0–6)
ERYTHROCYTE [DISTWIDTH] IN BLOOD BY AUTOMATED COUNT: 13.5 % (ref 11.6–15.1)
GFR SERPL CREATININE-BSD FRML MDRD: 7 ML/MIN/1.73SQ M
GLUCOSE SERPL-MCNC: 116 MG/DL (ref 65–140)
GLUCOSE SERPL-MCNC: 118 MG/DL (ref 65–140)
GLUCOSE SERPL-MCNC: 123 MG/DL (ref 65–140)
GLUCOSE SERPL-MCNC: 135 MG/DL (ref 65–140)
GLUCOSE SERPL-MCNC: 159 MG/DL (ref 65–140)
HCT VFR BLD AUTO: 25.8 % (ref 36.5–49.3)
HGB BLD-MCNC: 8.5 G/DL (ref 12–17)
LG PLATELETS BLD QL SMEAR: PRESENT
LYMPHOCYTES # BLD AUTO: 1.2 THOUSAND/UL (ref 0.6–4.47)
LYMPHOCYTES # BLD AUTO: 16 % (ref 14–44)
MACROCYTES BLD QL AUTO: PRESENT
MCH RBC QN AUTO: 31.8 PG (ref 26.8–34.3)
MCHC RBC AUTO-ENTMCNC: 32.9 G/DL (ref 31.4–37.4)
MCV RBC AUTO: 97 FL (ref 82–98)
METAMYELOCYTE ABSOLUTE CT: 0.08 THOUSAND/UL (ref 0–0.1)
METAMYELOCYTES NFR BLD MANUAL: 1 % (ref 0–1)
MONOCYTES # BLD AUTO: 0.45 THOUSAND/UL (ref 0–1.22)
MONOCYTES NFR BLD: 6 % (ref 4–12)
MYELOCYTE ABSOLUTE CT: 0.23 THOUSAND/UL (ref 0–0.1)
MYELOCYTES NFR BLD MANUAL: 3 % (ref 0–1)
NEUTROPHILS # BLD MANUAL: 5.05 THOUSAND/UL (ref 1.85–7.62)
NEUTS BAND NFR BLD MANUAL: 1 % (ref 0–8)
NEUTS SEG NFR BLD AUTO: 66 % (ref 43–75)
PLATELET # BLD AUTO: 288 THOUSANDS/UL (ref 149–390)
PLATELET BLD QL SMEAR: ADEQUATE
PMV BLD AUTO: 9 FL (ref 8.9–12.7)
POLYCHROMASIA BLD QL SMEAR: PRESENT
POTASSIUM SERPL-SCNC: 3.9 MMOL/L (ref 3.5–5.3)
RBC # BLD AUTO: 2.67 MILLION/UL (ref 3.88–5.62)
RBC MORPH BLD: PRESENT
SODIUM SERPL-SCNC: 133 MMOL/L (ref 135–147)
TOXIC GRANULES BLD QL SMEAR: PRESENT
WBC # BLD AUTO: 7.53 THOUSAND/UL (ref 4.31–10.16)

## 2025-05-03 PROCEDURE — 85027 COMPLETE CBC AUTOMATED: CPT | Performed by: INTERNAL MEDICINE

## 2025-05-03 PROCEDURE — 99232 SBSQ HOSP IP/OBS MODERATE 35: CPT | Performed by: FAMILY MEDICINE

## 2025-05-03 PROCEDURE — 80048 BASIC METABOLIC PNL TOTAL CA: CPT | Performed by: INTERNAL MEDICINE

## 2025-05-03 PROCEDURE — 99232 SBSQ HOSP IP/OBS MODERATE 35: CPT | Performed by: INTERNAL MEDICINE

## 2025-05-03 PROCEDURE — 82948 REAGENT STRIP/BLOOD GLUCOSE: CPT

## 2025-05-03 PROCEDURE — 99024 POSTOP FOLLOW-UP VISIT: CPT | Performed by: SPECIALIST

## 2025-05-03 PROCEDURE — 85007 BL SMEAR W/DIFF WBC COUNT: CPT | Performed by: INTERNAL MEDICINE

## 2025-05-03 RX ORDER — METOLAZONE 5 MG/1
10 TABLET ORAL 2 TIMES WEEKLY
Status: DISCONTINUED | OUTPATIENT
Start: 2025-05-05 | End: 2025-05-06 | Stop reason: HOSPADM

## 2025-05-03 RX ADMIN — HEPARIN SODIUM 7500 UNITS: 5000 INJECTION INTRAVENOUS; SUBCUTANEOUS at 05:39

## 2025-05-03 RX ADMIN — HYDROMORPHONE HYDROCHLORIDE 0.75 MG: 1 INJECTION, SOLUTION INTRAMUSCULAR; INTRAVENOUS; SUBCUTANEOUS at 14:42

## 2025-05-03 RX ADMIN — HYDROMORPHONE HYDROCHLORIDE 0.75 MG: 1 INJECTION, SOLUTION INTRAMUSCULAR; INTRAVENOUS; SUBCUTANEOUS at 18:28

## 2025-05-03 RX ADMIN — OXYCODONE HYDROCHLORIDE 10 MG: 10 TABLET ORAL at 03:50

## 2025-05-03 RX ADMIN — NYSTATIN: 100000 POWDER TOPICAL at 09:06

## 2025-05-03 RX ADMIN — AMPICILLIN SODIUM AND SULBACTAM SODIUM 3 G: 2; 1 INJECTION, POWDER, FOR SOLUTION INTRAMUSCULAR; INTRAVENOUS at 16:19

## 2025-05-03 RX ADMIN — HYDROMORPHONE HYDROCHLORIDE 0.75 MG: 1 INJECTION, SOLUTION INTRAMUSCULAR; INTRAVENOUS; SUBCUTANEOUS at 05:44

## 2025-05-03 RX ADMIN — ATORVASTATIN CALCIUM 80 MG: 80 TABLET, FILM COATED ORAL at 09:03

## 2025-05-03 RX ADMIN — Medication 1 CAPSULE: at 16:18

## 2025-05-03 RX ADMIN — OXYCODONE HYDROCHLORIDE 10 MG: 10 TABLET ORAL at 17:33

## 2025-05-03 RX ADMIN — CARVEDILOL 3.12 MG: 3.12 TABLET, FILM COATED ORAL at 16:19

## 2025-05-03 RX ADMIN — CALCIUM ACETATE 1334 MG: 667 CAPSULE ORAL at 11:18

## 2025-05-03 RX ADMIN — HYDROMORPHONE HYDROCHLORIDE 0.75 MG: 1 INJECTION, SOLUTION INTRAMUSCULAR; INTRAVENOUS; SUBCUTANEOUS at 01:10

## 2025-05-03 RX ADMIN — OXYCODONE HYDROCHLORIDE 10 MG: 10 TABLET ORAL at 21:48

## 2025-05-03 RX ADMIN — HYDROMORPHONE HYDROCHLORIDE 0.75 MG: 1 INJECTION, SOLUTION INTRAMUSCULAR; INTRAVENOUS; SUBCUTANEOUS at 10:39

## 2025-05-03 RX ADMIN — OXYCODONE HYDROCHLORIDE 10 MG: 10 TABLET ORAL at 09:04

## 2025-05-03 RX ADMIN — OXYCODONE HYDROCHLORIDE 10 MG: 10 TABLET ORAL at 13:22

## 2025-05-03 RX ADMIN — INSULIN LISPRO 2 UNITS: 100 INJECTION, SOLUTION INTRAVENOUS; SUBCUTANEOUS at 11:18

## 2025-05-03 RX ADMIN — HYDROMORPHONE HYDROCHLORIDE 0.75 MG: 1 INJECTION, SOLUTION INTRAMUSCULAR; INTRAVENOUS; SUBCUTANEOUS at 22:56

## 2025-05-03 RX ADMIN — HEPARIN SODIUM 7500 UNITS: 5000 INJECTION INTRAVENOUS; SUBCUTANEOUS at 16:20

## 2025-05-03 RX ADMIN — HEPARIN SODIUM 7500 UNITS: 5000 INJECTION INTRAVENOUS; SUBCUTANEOUS at 21:45

## 2025-05-03 RX ADMIN — ASPIRIN 81 MG: 81 TABLET, CHEWABLE ORAL at 09:03

## 2025-05-03 RX ADMIN — CHOLECALCIFEROL TAB 25 MCG (1000 UNIT) 2000 UNITS: 25 TAB at 09:03

## 2025-05-03 RX ADMIN — CALCIUM ACETATE 1334 MG: 667 CAPSULE ORAL at 16:18

## 2025-05-03 RX ADMIN — SEVELAMER HYDROCHLORIDE 800 MG: 800 TABLET ORAL at 16:18

## 2025-05-03 RX ADMIN — SEVELAMER HYDROCHLORIDE 800 MG: 800 TABLET ORAL at 11:18

## 2025-05-03 NOTE — ASSESSMENT & PLAN NOTE
Lab Results   Component Value Date    EGFR 7 05/03/2025    EGFR 6 05/02/2025    EGFR 4 05/01/2025    CREATININE 8.05 (H) 05/03/2025    CREATININE 9.18 (H) 05/02/2025    CREATININE 11.35 (H) 05/01/2025     On HD on Monday Wednesday Thursday Friday  Nephrology following, input appreciated  Continue HD as per nephrology  Continue Phoslo, nephrocaps, torsemide  Lab holiday in a.m.  FR 1800.

## 2025-05-03 NOTE — ASSESSMENT & PLAN NOTE
Lab Results   Component Value Date    EGFR 7 05/03/2025    EGFR 6 05/02/2025    EGFR 4 05/01/2025    CREATININE 8.05 (H) 05/03/2025    CREATININE 9.18 (H) 05/02/2025    CREATININE 11.35 (H) 05/01/2025   End-stage renal disease on dialysis  -Outpatient unit: [facility]  -Schedule: Likely Monday Wednesday Thursday and Friday  -Access: Right IJ PermCath.  Previous attempt at fistula not successful  -Dry weight 168 kg  -Plan:   - Required Cathflo on 4/30.  Received only 30 minutes of dialysis on 4/30.    target weight of 168 kg  - Order for heparin IV 3000 units to be given during dialysis treatment on 5/2.  Last dialysis treatment on 5/2 without any issues, did have to reverse line at the last hour of treatment.  Postdialysis weight was 166.9 kg with 3 L of UF.  Clinically appears euvolemic, next dialysis treatment on Monday, doing Monday Wednesday Friday with extra treatment on Thursday

## 2025-05-03 NOTE — ASSESSMENT & PLAN NOTE
Patient presented to ED with pain and swelling of right groin/scrotum. Reported pimple on right scrotum that ruptured with large amount of white drainage.   CT a/p showed extensive subcutaneous inflammatory stranding throughout the right groin, ventral lower pelvic wall, bilateral scrotal sacs suggestive of cellulitis/fasciitis or superficial infection; no findings of necrotizing fasciitis or Santhosh's gangrene; no abscess was noted  Lactate negative, procal 0.86 on admission  s/p I&D in OR 4/30 and found to have large right groin abscess  Wound Cultures with growth of group C beta-hemolytic strep and Prevotella [place]ivia  Off IV vancomycin and on Unasyn  Leukocytosis resolved.  Now fever free for at least 24 hours

## 2025-05-03 NOTE — ASSESSMENT & PLAN NOTE
-Results of CT reviewed, cellulitis right groin as per CT report.     - Seen by surgery, status post I&D on 4/30.  Continue management per surgical team.  Also on IV antibiotics-currently on Unasyn.

## 2025-05-03 NOTE — ASSESSMENT & PLAN NOTE
Continue torsemide  Coreg decreased to 3.125 mg twice a day and nifedipine held as blood pressures on the lower side at times

## 2025-05-03 NOTE — PROGRESS NOTES
Progress Note - Hospitalist   Name: Joaquin Frankel 44 y.o. male I MRN: [id number]  Unit/Bed#: 2 [address] Date of Admission: 4/28/2025   Date of Service: 5/3/2025 I Hospital Day: 3    Assessment & Plan  Abscess of right groin  Patient presented to ED with pain and swelling of right groin/scrotum. Reported pimple on right scrotum that ruptured with large amount of white drainage.   CT a/p showed extensive subcutaneous inflammatory stranding throughout the right groin, ventral lower pelvic wall, bilateral scrotal sacs suggestive of cellulitis/fasciitis or superficial infection; no findings of necrotizing fasciitis or Santhosh's gangrene; no abscess was noted  Lactate negative, procal 0.86 on admission  s/p I&D in OR 4/30 and found to have large right groin abscess  Wound Cultures with growth of group C beta-hemolytic strep and Prevotella Bivia  Off IV vancomycin and on Unasyn  Leukocytosis resolved.  Now fever free for at least 24 hours  Sepsis (HCC)  Not present on admission as noted by fever and leukocytosis in setting of R groin abscess  Lactate WNL, procal 0.86 on admission  Blood cultures negative so far  Currently on Unasyn.  ESRD (end stage renal disease) on dialysis (HCC)  Lab Results   Component Value Date    EGFR 7 05/03/2025    EGFR 6 05/02/2025    EGFR 4 05/01/2025    CREATININE 8.05 (H) 05/03/2025    CREATININE 9.18 (H) 05/02/2025    CREATININE 11.35 (H) 05/01/2025     On HD on Monday Wednesday Thursday Friday  Nephrology following, input appreciated  Continue HD as per nephrology  Continue Phoslo, nephrocaps, torsemide  Lab holiday in a.m.  FR 1800.  Anemia of chronic disease  Hgb of 6 on 5/2 is erroneous as repeat H/H was 8.3  Receives Venofer and Mircera outpatient  Hemoglobin today stable at 8.5  Lab holiday in a.m.  hx of Pulmonary embolism (HCC)  Completed treatment with warfarin  Primary hypertension  Continue torsemide  Coreg decreased to 3.125 mg twice a day and nifedipine held as blood  pressures on the lower side at times  Type 2 diabetes mellitus (HCC)  Lab Results   Component Value Date    HGBA1C 7.2 (H) 04/30/2025       Recent Labs     05/02/25  1100 05/02/25  1548 05/02/25  2104 05/03/25  0717   POCGLU 138 155* 154* 123     Not currently on medication.  Continue SSI, Monitor blood glucose.  Hyponatremia  Sodium level 133.  Continue fluid restriction   Management with dialysis.  Hyperphosphatemia  Continue PhosLo, sevelamer.  Metabolic acidosis  BiCarb level improved with higher bicarb bath on HD per nephrology.  Morbid obesity (HCC)  Body mass index is 57.02 kg/m².   Patient would benefit from dietary and lifestyle changes    VTE Pharmacologic Prophylaxis:    heparin    Mobility:   Basic Mobility Inpatient Raw Score: 22  JH-HLM Goal: 7: Walk 25 feet or more  JH-HLM Achieved: 6: Walk 10 steps or more  JH-HLM Goal NOT achieved. Continue with multidisciplinary rounding and encourage appropriate mobility to improve upon JH-HLM goals.    Patient Centered Rounds: I performed bedside rounds with nursing staff today.   Discussions with Specialists or Other Care Team Provider: yes -nephrology    Education and Discussions with Family / Patient: yes    Current Length of Stay: 3 day(s)  Current Patient Status: Inpatient   Certification Statement: The patient will continue to require additional inpatient hospital stay due to right groin abscess requiring wound care and IV antibiotic  Discharge Plan: Anticipate discharge in 48 hrs to home.    Code Status: Level 1 - Full Code    Subjective   Patient states his pain is much better controlled now.  States his swelling has improved compared to before    Objective :  Temp:  [97.4 °F (36.3 °C)-99.5 °F (37.5 °C)] 97.4 °F (36.3 °C)  HR:  [73-94] 76  BP: ()/(46-69) 100/50  Resp:  [12-18] 12  SpO2:  [84 %-97 %] 84 %  O2 Device: None (Room air)  Nasal Cannula O2 Flow Rate (L/min):  [3 L/min] 3 L/min    Body mass index is 57.02 kg/m².     Input and Output Summary  (last 24 hours):     Intake/Output Summary (Last 24 hours) at 5/3/2025 0904  Last data filed at 5/3/2025 0350  Gross per 24 hour   Intake 520 ml   Output 3514 ml   Net -2994 ml       Physical Exam  Vitals reviewed.   Constitutional:       General: He is not in acute distress.     Appearance: He is not toxic-appearing.   HENT:      Head: Normocephalic and atraumatic.   Eyes:      General:         Right eye: No discharge.         Left eye: No discharge.   Cardiovascular:      Rate and Rhythm: Normal rate and regular rhythm.   Pulmonary:      Effort: Pulmonary effort is normal. No respiratory distress.      Breath sounds: Normal breath sounds. No wheezing or rales.   Abdominal:      General: Bowel sounds are normal. There is no distension.      Palpations: Abdomen is soft.      Tenderness: There is no abdominal tenderness.   Musculoskeletal:      Comments: Left lower extremity chronic lymphedema   Skin:     Comments: Right-sided groin area with packing in place   Neurological:      Mental Status: He is alert and oriented to person, place, and time.         Lines/Drains:  Lines/Drains/Airways       Active Status       Name Placement date Placement time Site Days    HD Permanent Double Catheter 02/05/25  1609  Internal jugular  86                            Lab Results: I have reviewed the following results:   Results from last 7 days   Lab Units 05/03/25  0601   WBC Thousand/uL 7.53   HEMOGLOBIN g/dL 8.5*   HEMATOCRIT % 25.8*   PLATELETS Thousands/uL 288   BANDS PCT % 1   LYMPHO PCT % 16   MONO PCT % 6   EOS PCT % 7*     Results from last 7 days   Lab Units 05/03/25  0601 04/29/25  0527 04/28/25 2024   SODIUM mmol/L 133*   < > 134*   POTASSIUM mmol/L 3.9   < > 4.0   CHLORIDE mmol/L 87*   < > 92*   CO2 mmol/L 28   < > 23   BUN mg/dL 36*   < > 50*   CREATININE mg/dL 8.05*   < > 7.91*   ANION GAP mmol/L 18*   < > 19*   CALCIUM mg/dL 9.3   < > 9.1   ALBUMIN g/dL  --   --  3.6   TOTAL BILIRUBIN mg/dL  --   --  0.24   ALK  PHOS U/L  --   --  84   ALT U/L  --   --  15   AST U/L  --   --  12*   GLUCOSE RANDOM mg/dL 116   < > 169*    < > = values in this interval not displayed.     Results from last 7 days   Lab Units 04/28/25  2219   INR  1.08     Results from last 7 days   Lab Units 05/03/25  0717 05/02/25  2104 05/02/25  1548 05/02/25  1100 05/02/25  0725 05/01/25  2015 05/01/25  1610 05/01/25  1048 05/01/25  0657 04/30/25  2041 04/30/25  1537 04/30/25  1416   POC GLUCOSE mg/dl 123 154* 155* 138 140 176* 138 104 85 90 93 102     Results from last 7 days   Lab Units 04/30/25  0617   HEMOGLOBIN A1C % 7.2*     Results from last 7 days   Lab Units 04/28/25  2317 04/28/25  2219   LACTIC ACID mmol/L  --  0.7   PROCALCITONIN ng/ml 0.86*  --        Recent Cultures (last 7 days):   Results from last 7 days   Lab Units 04/30/25  1223 04/29/25  1357 04/28/25  2221 04/28/25  2219   BLOOD CULTURE   --   --  No Growth at 72 hrs. No Growth at 72 hrs.   GRAM STAIN RESULT  1+ Gram positive cocci in pairs* No Polys or Bacteria seen  --   --    WOUND CULTURE  2+ Growth of Beta Hemolytic Streptococcus Group C* 3+ Growth of Beta Hemolytic Streptococcus Group C*  --   --        Imaging Results Review: No pertinent imaging studies reviewed.  Other Study Results Review: No additional pertinent studies reviewed.    Last 24 Hours Medication List:     Current Facility-Administered Medications:     acetaminophen (TYLENOL) tablet 650 mg, Q6H PRN    ampicillin-sulbactam (UNASYN) 3 g in sodium chloride 0.9 % 100 mL IVPB, Q24H, Last Rate: 3 g (05/02/25 1549)    aspirin chewable tablet 81 mg, Daily    atorvastatin (LIPITOR) tablet 80 mg, Daily    calcitriol (ROCALTROL) capsule 0.75 mcg, Once per day on Monday Wednesday Friday    calcium acetate (PHOSLO) capsule 1,334 mg, TID    calcium carbonate (TUMS) chewable tablet 500 mg, TID PRN    carvedilol (COREG) tablet 3.125 mg, BID With Meals    Cholecalciferol (VITAMIN D3) tablet 2,000 Units, Daily    docusate sodium  (COLACE) capsule 100 mg, BID    heparin (porcine) subcutaneous injection 7,500 Units, Q8H SHAZIA    HYDROmorphone (DILAUDID) injection 0.5 mg, Once    HYDROmorphone (DILAUDID) injection 0.75 mg, Q3H PRN    insulin lispro (HumALOG/ADMELOG) 100 units/mL subcutaneous injection 2-12 Units, TID AC **AND** Fingerstick Glucose (POCT), TID AC    isosorbide mononitrate (IMDUR) 24 hr tablet 60 mg, Daily    naloxone (NARCAN) 0.04 mg/mL syringe 0.04 mg, Q1MIN PRN    [Held by provider] NIFEdipine (PROCARDIA XL) 24 hr tablet 30 mg, HS    nystatin (MYCOSTATIN) powder, BID    ondansetron (ZOFRAN) injection 4 mg, Q6H PRN    oxyCODONE (ROXICODONE) IR tablet 5 mg, Q4H PRN **OR** oxyCODONE (ROXICODONE) immediate release tablet 10 mg, Q4H PRN    sevelamer (RENAGEL) tablet 800 mg, TID With Meals    torsemide (DEMADEX) tablet 100 mg, Daily    vit B complex-vit C-folic acid (Renal Caps) capsule 1 capsule, Daily With Dinner    Administrative Statements   Today, Patient Was Seen By: Wanda Navarro DO    **Please Note: This note may have been constructed using a voice recognition system.**

## 2025-05-03 NOTE — ASSESSMENT & PLAN NOTE
Lab Results   Component Value Date    HGBA1C 7.2 (H) 04/30/2025   Recent hemoglobin A1c 7.2%     Tight glucose control - Humalog    Recent Labs     05/02/25  1100 05/02/25  1548 05/02/25  2104 05/03/25  0717   POCGLU 138 155* 154* 123       Blood Sugar Average: Last 72 hrs:  (P) [id number]

## 2025-05-03 NOTE — PLAN OF CARE
Problem: PAIN - ADULT  Goal: Verbalizes/displays adequate comfort level or baseline comfort level  Description: Interventions:- Encourage patient to monitor pain and request assistance- Assess pain using appropriate pain scale- Administer analgesics based on type and severity of pain and evaluate response- Implement non-pharmacological measures as appropriate and evaluate response- Consider cultural and social influences on pain and pain management- Notify physician/advanced practitioner if interventions unsuccessful or patient reports new pain  Outcome: Progressing     Problem: INFECTION - ADULT  Goal: Absence or prevention of progression during hospitalization  Description: INTERVENTIONS:- Assess and monitor for signs and symptoms of infection- Monitor lab/diagnostic results- Monitor all insertion sites, i.e. indwelling lines, tubes, and drains- Monitor endotracheal if appropriate and nasal secretions for changes in amount and color- [place] appropriate cooling/warming therapies per order- Administer medications as ordered- Instruct and encourage patient and family to use good hand hygiene technique- Identify and instruct in appropriate isolation precautions for identified infection/condition  Outcome: Progressing  Goal: Absence of fever/infection during neutropenic period  Description: INTERVENTIONS:- Monitor WBC  Outcome: Progressing    Goal: Maintain or return to baseline ADL function  Description: INTERVENTIONS:-  Assess patient's ability to carry out ADLs; assess patient's baseline for ADL function and identify physical deficits which impact ability to perform ADLs (bathing, care of mouth/teeth, toileting, grooming, dressing, etc.)- Assess/evaluate cause of self-care deficits - Assess range of motion- Assess patient's mobility; develop plan if impaired- Assess patient's need for assistive devices and provide as appropriate- Encourage maximum independence but intervene and supervise when necessary- Involve  family in performance of ADLs- Assess for home care needs following discharge - Consider OT consult to assist with ADL evaluation and planning for discharge- Provide patient education as appropriate  Outcome: Progressing       Problem: DISCHARGE PLANNING  Goal: Discharge to home or other facility with appropriate resources  Description: INTERVENTIONS:- Identify barriers to discharge w/patient and caregiver- Arrange for needed discharge resources and transportation as appropriate- Identify discharge learning needs (meds, wound care, etc.)- Arrange for interpretive services to assist at discharge as needed- Refer to Case Management Department for coordinating discharge planning if the patient needs post-hospital services based on physician/advanced practitioner order or complex needs related to functional status, cognitive ability, or social support system  Outcome: Progressing     Problem: Knowledge Deficit  Goal: Patient/family/caregiver demonstrates understanding of disease process, treatment plan, medications, and discharge instructions  Description: Complete learning assessment and assess knowledge base.Interventions:- Provide teaching at level of understanding- Provide teaching via preferred learning methods  Outcome: Progressing     Problem: METABOLIC, FLUID AND ELECTROLYTES - ADULT  Goal: Electrolytes maintained within normal limits  Description: INTERVENTIONS:- Monitor labs and assess patient for signs and symptoms of electrolyte imbalances- Administer electrolyte replacement as ordered- Monitor response to electrolyte replacements, including repeat lab results as appropriate- Instruct patient on fluid and nutrition as appropriate  Outcome: Progressing  Goal: Fluid balance maintained  Description: INTERVENTIONS:- Monitor labs - Monitor I/O and WT- Instruct patient on fluid and nutrition as appropriate- Assess for signs & symptoms of volume excess or deficit  Outcome: Progressing

## 2025-05-03 NOTE — ASSESSMENT & PLAN NOTE
Hgb of 6 on 5/2 is erroneous as repeat H/H was 8.3  Receives Venofer and Mircera outpatient  Hemoglobin today stable at 8.5  Lab holiday in a.m.

## 2025-05-03 NOTE — ASSESSMENT & PLAN NOTE
POD#3 I&D right groin abscess     Induration and erythema improving.     Continue IV antibiotics.   Dressing changed today and re-packed  Continue daily dressing changes.   Patient will need follow up in wound care center upon discharge.   Possible discharge next 24-48 hours

## 2025-05-03 NOTE — ASSESSMENT & PLAN NOTE
Not present on admission as noted by fever and leukocytosis in setting of R groin abscess  Lactate WNL, procal 0.86 on admission  Blood cultures negative so far  Currently on Unasyn.

## 2025-05-03 NOTE — ASSESSMENT & PLAN NOTE
Lab Results   Component Value Date    HGBA1C 7.2 (H) 04/30/2025       Recent Labs     05/02/25  1100 05/02/25  1548 05/02/25  2104 05/03/25  0717   POCGLU 138 155* 154* 123     Not currently on medication.  Continue SSI, Monitor blood glucose.

## 2025-05-03 NOTE — ASSESSMENT & PLAN NOTE
- Repeat hemoglobin improved to 8.5 g/dL.  No JOSSELYN due to past history of PE.  Continue to monitor hemoglobin   -as outpatient on Venofer 100 mg weekly.  Also on Mircera 60 mcg every 2 weeks.  Would consider inpatient JOSSELYN if prolonged hospital stay

## 2025-05-03 NOTE — ASSESSMENT & PLAN NOTE
Lab Results   Component Value Date    HGBA1C 7.2 (H) 04/30/2025       Recent Labs     05/02/25  1100 05/02/25  1548 05/02/25  2104 05/03/25  0717   POCGLU 138 155* 154* 123       Blood Sugar Average: Last 72 hrs:  (P) [id number]  -Continue management per primary team

## 2025-05-03 NOTE — PLAN OF CARE
Problem: PAIN - ADULT  Goal: Verbalizes/displays adequate comfort level or baseline comfort level  Description: Interventions:- Encourage patient to monitor pain and request assistance- Assess pain using appropriate pain scale- Administer analgesics based on type and severity of pain and evaluate response- Implement non-pharmacological measures as appropriate and evaluate response- Consider cultural and social influences on pain and pain management- Notify physician/advanced practitioner if interventions unsuccessful or patient reports new pain  Outcome: Progressing     Problem: INFECTION - ADULT  Goal: Absence or prevention of progression during hospitalization  Description: INTERVENTIONS:- Assess and monitor for signs and symptoms of infection- Monitor lab/diagnostic results- Monitor all insertion sites, i.e. indwelling lines, tubes, and drains- Monitor endotracheal if appropriate and nasal secretions for changes in amount and color- [place] appropriate cooling/warming therapies per order- Administer medications as ordered- Instruct and encourage patient and family to use good hand hygiene technique- Identify and instruct in appropriate isolation precautions for identified infection/condition  Outcome: Progressing  Goal: Absence of fever/infection during neutropenic period  Description: INTERVENTIONS:- Monitor WBC  Outcome: Progressing

## 2025-05-03 NOTE — ASSESSMENT & PLAN NOTE
-Monitor phosphorus level.  Last phosphorus level was 7.0 on 5/2.  Continue binders-PhosLo and sevelamer currently.    -Secondary hyperparathyroidism of renal origin, continue calcitriol 0.75 mcg 3 times a week

## 2025-05-03 NOTE — ASSESSMENT & PLAN NOTE
-Blood pressure has been soft.  Currently on lower dose of Coreg 3.125 mg twice daily, and on  nifedipine 30 mg daily, torsemide 100 mg daily.  As per the [facility] records Coreg was not listed.  Patient has CAD, will continue beta-blocker.  Hold off on nifedipine for now in the setting of hypotension, continue Coreg with hold parameters  -> As per outpatient [facility] record also on metolazone 10 mg 2 times a week, discussed with patient, he takes metolazone on the weekends.

## 2025-05-03 NOTE — PROGRESS NOTES
Progress Note - Surgery-General   Name: Joaquin Frankel 44 y.o. male I MRN: [id number]  Unit/Bed#: 2 [place] [address] I Date of Admission: 4/28/2025   Date of Service: 5/3/2025 I Hospital Day: 3     Assessment & Plan  Abscess of right groin  POD#3 I&D right groin abscess     Induration and erythema improving.     Continue IV antibiotics.   Dressing changed today and re-packed  Continue daily dressing changes.   Patient will need follow up in wound care center upon discharge.   Possible discharge next 24-48 hours     Morbid obesity (HCC)  Could be contributing factor patient currently has BMI 57, does appear the patient has lost some weight  Anemia of chronic disease  Likely due to end-stage renal disease  Baseline hemoglobin 9.0. trending down from 9.1 > 8.1 > 6.0>8.3  Asymptomatic without dizziness or lightheadedness   ESRD (end stage renal disease) on dialysis (HCC)  Lab Results   Component Value Date    EGFR 7 05/03/2025    EGFR 6 05/02/2025    EGFR 4 05/01/2025    CREATININE 8.05 (H) 05/03/2025    CREATININE 9.18 (H) 05/02/2025    CREATININE 11.35 (H) 05/01/2025   Receiving supplemental dialysis 3-4 times weekly  Nephrology following   Dialysis yesterday removed 4.5L fluid   Fluid restriction   Type 2 diabetes mellitus (HCC)  Lab Results   Component Value Date    HGBA1C 7.2 (H) 04/30/2025   Recent hemoglobin A1c 7.2%     Tight glucose control - Humalog    Recent Labs     05/02/25  1100 05/02/25  1548 05/02/25  2104 05/03/25  0717   POCGLU 138 155* 154* 123       Blood Sugar Average: Last 72 hrs:  (P) [id number]    Sepsis (HCC)  Resolved. Secondary to abscess        Subjective   Patient seen examined bedside.  Patient reports no acute changes to the night.  Patient reports groin and scrotal pain is improving.    Objective :  Temp:  [97.4 °F (36.3 °C)-99.5 °F (37.5 °C)] 97.4 °F (36.3 °C)  HR:  [73-94] 76  BP: ()/(46-69) 100/50  Resp:  [12-18] 12  SpO2:  [84 %-97 %] 84 %  O2 Device: None (Room  air)  Nasal Cannula O2 Flow Rate (L/min):  [3 L/min] 3 L/min    I/O         05/01 0701 05/02 0700 05/02 0701 05/03 0700 05/03 0701 05/04 0700    P.O.  220     I.V. (mL/kg) 500 (2.9) 500 (2.9)     Other       Total Intake(mL/kg) 500 (2.9) 720 (4.2)     Other 4495 3514     Total Output 4495 3514     Net -3995 -4917                  Lines/Drains/Airways       Active Status       Name Placement date Placement time Site Days    HD Permanent Double Catheter 02/05/25  1609  Internal jugular  86                  Physical Exam  Vitals and nursing note reviewed.   Constitutional:       Appearance: Normal appearance.   HENT:      Head: Normocephalic and atraumatic.      Nose: Nose normal.      Mouth/Throat:      Mouth: Mucous membranes are moist.   Eyes:      Extraocular Movements: Extraocular movements intact.   Cardiovascular:      Rate and Rhythm: Normal rate.   Pulmonary:      Effort: Pulmonary effort is normal. No respiratory distress.   Abdominal:      Palpations: Abdomen is soft.   Genitourinary:         Comments: Improving erythema and edema.   Skin:     General: Skin is warm.   Neurological:      Mental Status: He is alert.   Psychiatric:         Mood and Affect: Mood normal.           Lab Results: I have reviewed the following results:  Recent Labs     05/02/25  0544 05/02/25  0854 05/03/25  0601   WBC 8.99  --  7.53   HGB 6.0*   < > 8.5*   HCT 18.4*   < > 25.8*     --  288   BANDSPCT  --   --  1   SODIUM 133*  --  133*   K 4.0  --  3.9   CL 89*  --  87*   CO2 25  --  28   BUN 49*  --  36*   CREATININE 9.18*  --  8.05*   GLUC 140  --  116   PHOS 7.0*  --   --     < > = values in this interval not displayed.           VTE Pharmacologic Prophylaxis: VTE covered by:  heparin (porcine), Subcutaneous, 7,500 Units at 05/03/25 0539     VTE Mechanical Prophylaxis: sequential compression device

## 2025-05-03 NOTE — ASSESSMENT & PLAN NOTE
Lab Results   Component Value Date    EGFR 7 05/03/2025    EGFR 6 05/02/2025    EGFR 4 05/01/2025    CREATININE 8.05 (H) 05/03/2025    CREATININE 9.18 (H) 05/02/2025    CREATININE 11.35 (H) 05/01/2025   Receiving supplemental dialysis 3-4 times weekly  Nephrology following   Dialysis yesterday removed 4.5L fluid   Fluid restriction

## 2025-05-03 NOTE — PROGRESS NOTES
Progress Note - Nephrology   Name: Joaquin Frankel 44 y.o. male I MRN: [id number]  Unit/Bed#: 2 [place] [address] I Date of Admission: 4/28/2025   Date of Service: 5/3/2025 I Hospital Day: 3    Assessment & Plan  ESRD (end stage renal disease) on dialysis ([facility])  Lab Results   Component Value Date    EGFR 7 05/03/2025    EGFR 6 05/02/2025    EGFR 4 05/01/2025    CREATININE 8.05 (H) 05/03/2025    CREATININE 9.18 (H) 05/02/2025    CREATININE 11.35 (H) 05/01/2025   End-stage renal disease on dialysis  -Outpatient unit: [facility]  -Schedule: Likely Monday Wednesday Thursday and Friday  -Access: Right IJ PermCath.  Previous attempt at fistula not successful  -Dry weight 168 kg  -Plan:   - Required Cathflo on 4/30.  Received only 30 minutes of dialysis on 4/30.    target weight of 168 kg  - Order for heparin IV 3000 units to be given during dialysis treatment on 5/2.  Last dialysis treatment on 5/2 without any issues, did have to reverse line at the last hour of treatment.  Postdialysis weight was 166.9 kg with 3 L of UF.  Clinically appears euvolemic, next dialysis treatment on Monday, doing Monday Wednesday Friday with extra treatment on Thursday  Primary hypertension  -Blood pressure has been soft.  Currently on lower dose of Coreg 3.125 mg twice daily, and on  nifedipine 30 mg daily, torsemide 100 mg daily.  As per the [facility] Coreg was not listed.  Patient has CAD, will continue beta-blocker.  Hold off on nifedipine for now in the setting of hypotension, continue Coreg with hold parameters  -> As per outpatient [facility] record also on metolazone 10 mg 2 times a week, discussed with patient, he takes metolazone on the weekends.  Anemia of chronic disease  - Repeat hemoglobin improved to 8.5 g/dL.  No JOSSELYN due to past history of PE.  Continue to monitor hemoglobin   -as outpatient on Venofer 100 mg weekly.  Also on Mircera 60 mcg every 2 weeks.  Would consider inpatient JOSSELYN if prolonged hospital  stay  Hyperphosphatemia  -Monitor phosphorus level.  Last phosphorus level was 7.0 on 5/2.  Continue binders-PhosLo and sevelamer currently.    -Secondary hyperparathyroidism of renal origin, continue calcitriol 0.75 mcg 3 times a week  Hyponatremia  - Sodium level was low at 133 meq/L.  Due to decreased free water excretion, recommend fluid restriction.  Continue UF with HD  Metabolic acidosis  -Bicarb level improved to 28 with higher bicarb bath, continue to monitor.  Abscess of right groin  -Results of CT reviewed, cellulitis right groin as per CT report.     - Seen by surgery, status post I&D on 4/30.  Continue management per surgical team.  Also on IV antibiotics-currently on Unasyn.  hx of Pulmonary embolism (HCC)  -Continue anticoagulation per primary team  Type 2 diabetes mellitus (HCC)  Lab Results   Component Value Date    HGBA1C 7.2 (H) 04/30/2025       Recent Labs     05/02/25  1100 05/02/25  1548 05/02/25  2104 05/03/25  0717   POCGLU 138 155* 154* 123       Blood Sugar Average: Last 72 hrs:  (P) [id number]  -Continue management per primary team    I have reviewed the nephrology recommendations including plan for next hemodialysis treatment on Monday, with primary team, and we are in agreement with renal plan including the information outlined above.     Subjective   Still with ongoing right groin pain.  No chest pain or shortness of breath, nausea vomiting    Objective :  Temp:  [97.4 °F (36.3 °C)-99.7 °F (37.6 °C)] 97.4 °F (36.3 °C)  HR:  [73-94] 76  BP: ()/(43-69) 94/50  Resp:  [12-18] 12  SpO2:  [84 %-97 %] 84 %  O2 Device: None (Room air)  Nasal Cannula O2 Flow Rate (L/min):  [3 L/min] 3 L/min    Current Weight: Weight - Scale: (!) 170 kg (375 lb)  First Weight: Weight - Scale: (!) 171 kg (376 lb)  I/O         05/01 0701  05/02 0700 05/02 0701  05/03 0700 05/03 0701  05/04 0700    P.O.  220     I.V. (mL/kg) 500 (2.9) 500 (2.9)     Other       Total Intake(mL/kg) 500 (2.9) 720 (4.2)      Other 9248 0823     Total Output 9424 3096     Net -1072 -2778                  Physical Exam   General:  Ill looking, awake.  Eyes: Conjunctivae pink,  Sclera anicteric  ENT: lips and mucous membranes moist  Neck: supple   Chest: Clear to Auscultation both lungs,  no crackles, ronchus or wheezing.  CVS: S1 & S2 present, normal rate, regular rhythm, no murmur.  Abdomen: soft, non-tender, non-distended, Bowel sounds normoactive  Extremities: no edema of  legs  Skin: no rash  Neuro: awake, alert, oriented x 3   Psych: Mood and affect appropriate    Medications:    Current Facility-Administered Medications:     acetaminophen (TYLENOL) tablet 650 mg, 650 mg, Oral, Q6H PRN, Tim Humphrey PA-C, 650 mg at 05/01/25 1650    ampicillin-sulbactam (UNASYN) 3 g in sodium chloride 0.9 % 100 mL IVPB, 3 g, Intravenous, Q24H, Wanda Navarro DO, Last Rate: 200 mL/hr at 05/02/25 1549, 3 g at 05/02/25 1549    aspirin chewable tablet 81 mg, 81 mg, Oral, Daily, Tim Humphrey PA-C, 81 mg at 05/02/25 1229    atorvastatin (LIPITOR) tablet 80 mg, 80 mg, Oral, Daily, Tim Humphrey PA-C, 80 mg at 05/02/25 1229    calcitriol (ROCALTROL) capsule 0.75 mcg, 0.75 mcg, Oral, Once per day on Monday Wednesday Friday, Tim Humphrey PA-C, 0.75 mcg at 05/02/25 1228    calcium acetate (PHOSLO) capsule 1,334 mg, 1,334 mg, Oral, TID, Tim Humphrey PA-C, 1,334 mg at 05/02/25 2051    calcium carbonate (TUMS) chewable tablet 500 mg, 500 mg, Oral, TID PRN, Joanne Anguiano MD, 500 mg at 05/02/25 2351    carvedilol (COREG) tablet 3.125 mg, 3.125 mg, Oral, BID With Meals, Johnny Viramontes MD, 3.125 mg at 05/02/25 1545    Cholecalciferol (VITAMIN D3) tablet 2,000 Units, 2,000 Units, Oral, Daily, Tim Humphrey PA-C, 2,000 Units at 05/02/25 1229    docusate sodium (COLACE) capsule 100 mg, 100 mg, Oral, BID, Tim Humphrey PA-C, 100 mg at 05/02/25 1719    heparin (porcine) subcutaneous injection 7,500 Units, 7,500 Units, Subcutaneous, Q8H SHAZIA, Tim Humphrey PA-C, 7,500  Units at 05/03/25 0539    HYDROmorphone (DILAUDID) injection 0.5 mg, 0.5 mg, Intravenous, Once, Wanda Revankar, DO    HYDROmorphone (DILAUDID) injection 0.75 mg, 0.75 mg, Intravenous, Q3H PRN, Wanda Revankar, DO, 0.75 mg at 05/03/25 0544    insulin lispro (HumALOG/ADMELOG) 100 units/mL subcutaneous injection 2-12 Units, 2-12 Units, Subcutaneous, TID AC, 2 Units at 05/02/25 1614 **AND** Fingerstick Glucose (POCT), , , TID AC, Tim Humphrey PA-C    isosorbide mononitrate (IMDUR) 24 hr tablet 60 mg, 60 mg, Oral, Daily, Tim Humphrey PA-C, 60 mg at 05/02/25 1229    naloxone (NARCAN) 0.04 mg/mL syringe 0.04 mg, 0.04 mg, Intravenous, Q1MIN PRN, Tim Humphrey PA-C    NIFEdipine (PROCARDIA XL) 24 hr tablet 30 mg, 30 mg, Oral, HS, Tim Humphrey PA-C, 30 mg at 05/01/25 2107    nystatin (MYCOSTATIN) powder, , Topical, BID, Wanda Revshanear, DO, Given at 05/02/25 1719    ondansetron (ZOFRAN) injection 4 mg, 4 mg, Intravenous, Q6H PRN, Tim Humphrey PA-C, 4 mg at 05/02/25 1358    oxyCODONE (ROXICODONE) IR tablet 5 mg, 5 mg, Oral, Q4H PRN, 5 mg at 05/02/25 0813 **OR** oxyCODONE (ROXICODONE) immediate release tablet 10 mg, 10 mg, Oral, Q4H PRN, Tim Humphrey PA-C, 10 mg at 05/03/25 0350    sevelamer (RENAGEL) tablet 800 mg, 800 mg, Oral, TID With Meals, Tim Humphrey PA-C, 800 mg at 05/02/25 1545    torsemide (DEMADEX) tablet 100 mg, 100 mg, Oral, Daily, Tim Humphrey PA-C, 100 mg at 05/02/25 1229    vit B complex-vit C-folic acid (Renal Caps) capsule 1 capsule, 1 mg, Oral, Daily With Dinner, Tim Humphrey PA-C, 1 capsule at 05/02/25 1545      Lab Results: I have reviewed the following results:  Results from last 7 days   Lab Units 05/03/25  0601 05/02/25  0854 05/02/25  0544 05/01/25  0430 04/30/25  0617 04/29/25  0527 04/28/25 2024   WBC Thousand/uL 7.53  --  8.99 7.68 12.69* 9.82 10.66*   HEMOGLOBIN g/dL 8.5* 8.3* 6.0* 8.1* 9.1* 9.4* 9.3*   HEMATOCRIT % 25.8* 24.8* 18.4* 25.3* 27.8* 28.9* 28.0*   PLATELETS Thousands/uL 288   "--  231 294 361 352 336   POTASSIUM mmol/L 3.9  --  4.0 4.8 5.2 4.2 4.0   CHLORIDE mmol/L 87*  --  89* 93* 94* 92* 92*   CO2 mmol/L 28  --  25 20* 20* 20* 23   BUN mg/dL 36*  --  49* 74* 71* 57* 50*   CREATININE mg/dL 8.05*  --  9.18* 11.35* 10.38* 8.67* 7.91*   CALCIUM mg/dL 9.3  --  8.9 8.8 9.4 8.9 9.1   PHOSPHORUS mg/dL  --   --  7.0*  --   --   --   --    ALBUMIN g/dL  --   --   --   --   --   --  3.6       Administrative Statements     Portions of the record may have been created with voice recognition software. Occasional wrong word or \"sound a like\" substitutions may have occurred due to the inherent limitations of voice recognition software. Read the chart carefully and recognize, using context, where substitutions have occurred.If you have any questions, please contact the dictating provider.  "

## 2025-05-04 LAB
BACTERIA BLD CULT: NORMAL
BACTERIA BLD CULT: NORMAL
GLUCOSE SERPL-MCNC: 110 MG/DL (ref 65–140)
GLUCOSE SERPL-MCNC: 111 MG/DL (ref 65–140)
GLUCOSE SERPL-MCNC: 116 MG/DL (ref 65–140)
GLUCOSE SERPL-MCNC: 129 MG/DL (ref 65–140)

## 2025-05-04 PROCEDURE — 82948 REAGENT STRIP/BLOOD GLUCOSE: CPT

## 2025-05-04 PROCEDURE — 99024 POSTOP FOLLOW-UP VISIT: CPT | Performed by: SPECIALIST

## 2025-05-04 PROCEDURE — 99232 SBSQ HOSP IP/OBS MODERATE 35: CPT | Performed by: FAMILY MEDICINE

## 2025-05-04 RX ORDER — POLYETHYLENE GLYCOL 3350 17 G/17G
17 POWDER, FOR SOLUTION ORAL 2 TIMES DAILY
Status: DISCONTINUED | OUTPATIENT
Start: 2025-05-04 | End: 2025-05-06 | Stop reason: HOSPADM

## 2025-05-04 RX ORDER — HEPARIN SODIUM 1000 [USP'U]/ML
3000 INJECTION, SOLUTION INTRAVENOUS; SUBCUTANEOUS ONCE
Status: COMPLETED | OUTPATIENT
Start: 2025-05-05 | End: 2025-05-05

## 2025-05-04 RX ADMIN — CHOLECALCIFEROL TAB 25 MCG (1000 UNIT) 2000 UNITS: 25 TAB at 08:22

## 2025-05-04 RX ADMIN — ISOSORBIDE MONONITRATE 60 MG: 60 TABLET, EXTENDED RELEASE ORAL at 08:23

## 2025-05-04 RX ADMIN — HYDROMORPHONE HYDROCHLORIDE 0.75 MG: 1 INJECTION, SOLUTION INTRAMUSCULAR; INTRAVENOUS; SUBCUTANEOUS at 20:31

## 2025-05-04 RX ADMIN — CALCIUM ACETATE 1334 MG: 667 CAPSULE ORAL at 16:15

## 2025-05-04 RX ADMIN — TORSEMIDE 100 MG: 100 TABLET ORAL at 08:22

## 2025-05-04 RX ADMIN — DOCUSATE SODIUM 100 MG: 100 CAPSULE, LIQUID FILLED ORAL at 08:22

## 2025-05-04 RX ADMIN — OXYCODONE HYDROCHLORIDE 10 MG: 10 TABLET ORAL at 14:44

## 2025-05-04 RX ADMIN — HEPARIN SODIUM 7500 UNITS: 5000 INJECTION INTRAVENOUS; SUBCUTANEOUS at 06:13

## 2025-05-04 RX ADMIN — Medication 1 CAPSULE: at 16:15

## 2025-05-04 RX ADMIN — ANTACID TABLETS 500 MG: 500 TABLET, CHEWABLE ORAL at 21:00

## 2025-05-04 RX ADMIN — OXYCODONE HYDROCHLORIDE 10 MG: 10 TABLET ORAL at 06:13

## 2025-05-04 RX ADMIN — ASPIRIN 81 MG: 81 TABLET, CHEWABLE ORAL at 08:23

## 2025-05-04 RX ADMIN — HEPARIN SODIUM 7500 UNITS: 5000 INJECTION INTRAVENOUS; SUBCUTANEOUS at 13:35

## 2025-05-04 RX ADMIN — POLYETHYLENE GLYCOL 3350 17 G: 17 POWDER, FOR SOLUTION ORAL at 16:27

## 2025-05-04 RX ADMIN — HYDROMORPHONE HYDROCHLORIDE 0.75 MG: 1 INJECTION, SOLUTION INTRAMUSCULAR; INTRAVENOUS; SUBCUTANEOUS at 03:10

## 2025-05-04 RX ADMIN — OXYCODONE HYDROCHLORIDE 10 MG: 10 TABLET ORAL at 23:25

## 2025-05-04 RX ADMIN — DOCUSATE SODIUM 100 MG: 100 CAPSULE, LIQUID FILLED ORAL at 18:02

## 2025-05-04 RX ADMIN — OXYCODONE HYDROCHLORIDE 10 MG: 10 TABLET ORAL at 10:12

## 2025-05-04 RX ADMIN — ATORVASTATIN CALCIUM 80 MG: 80 TABLET, FILM COATED ORAL at 08:22

## 2025-05-04 RX ADMIN — OXYCODONE HYDROCHLORIDE 10 MG: 10 TABLET ORAL at 02:05

## 2025-05-04 RX ADMIN — HYDROMORPHONE HYDROCHLORIDE 0.75 MG: 1 INJECTION, SOLUTION INTRAMUSCULAR; INTRAVENOUS; SUBCUTANEOUS at 11:41

## 2025-05-04 RX ADMIN — CARVEDILOL 3.12 MG: 3.12 TABLET, FILM COATED ORAL at 16:15

## 2025-05-04 RX ADMIN — OXYCODONE HYDROCHLORIDE 10 MG: 10 TABLET ORAL at 19:28

## 2025-05-04 RX ADMIN — CALCIUM ACETATE 1334 MG: 667 CAPSULE ORAL at 07:49

## 2025-05-04 RX ADMIN — HYDROMORPHONE HYDROCHLORIDE 0.75 MG: 1 INJECTION, SOLUTION INTRAMUSCULAR; INTRAVENOUS; SUBCUTANEOUS at 07:51

## 2025-05-04 RX ADMIN — HYDROMORPHONE HYDROCHLORIDE 0.75 MG: 1 INJECTION, SOLUTION INTRAMUSCULAR; INTRAVENOUS; SUBCUTANEOUS at 16:23

## 2025-05-04 RX ADMIN — AMPICILLIN SODIUM AND SULBACTAM SODIUM 3 G: 2; 1 INJECTION, POWDER, FOR SOLUTION INTRAMUSCULAR; INTRAVENOUS at 16:28

## 2025-05-04 RX ADMIN — HEPARIN SODIUM 7500 UNITS: 5000 INJECTION INTRAVENOUS; SUBCUTANEOUS at 21:00

## 2025-05-04 RX ADMIN — CALCIUM ACETATE 1334 MG: 667 CAPSULE ORAL at 11:39

## 2025-05-04 RX ADMIN — CARVEDILOL 3.12 MG: 3.12 TABLET, FILM COATED ORAL at 07:50

## 2025-05-04 NOTE — PROGRESS NOTES
Progress Note - Surgery-General   Name: Joaquin Frankel 44 y.o. male I MRN: [id number]  Unit/Bed#: 2 [address] Date of Admission: 4/28/2025   Date of Service: 5/4/2025 I Hospital Day: 4     Assessment & Plan  Abscess of right groin  POD#3 I&D right groin abscess     Induration and erythema improving.     Continue IV antibiotics.   Dressing changed today and re-packed  Continue daily dressing changes.   Patient will need follow up in wound care center upon discharge.   Possible discharge next 24hours     Morbid obesity (HCC)  Could be contributing factor patient currently has BMI 57, does appear the patient has lost some weight  Anemia of chronic disease  Likely due to end-stage renal disease  Baseline hemoglobin 9.0. trending down from 9.1 > 8.1 > 6.0>8.3  Asymptomatic without dizziness or lightheadedness   ESRD (end stage renal disease) on dialysis ([facility])  Lab Results   Component Value Date    EGFR 7 05/03/2025    EGFR 6 05/02/2025    EGFR 4 05/01/2025    CREATININE 8.05 (H) 05/03/2025    CREATININE 9.18 (H) 05/02/2025    CREATININE 11.35 (H) 05/01/2025   Receiving supplemental dialysis 3-4 times weekly  Nephrology following   Dialysis yesterday removed 4.5L fluid   Fluid restriction   Type 2 diabetes mellitus ([facility])  Lab Results   Component Value Date    HGBA1C 7.2 (H) 04/30/2025   Recent hemoglobin A1c 7.2%     Tight glucose control - Humalog    Recent Labs     05/03/25  1105 05/03/25  1607 05/03/25  2043 05/04/25  0719   POCGLU 159* 118 135 111       Blood Sugar Average: Last 72 hrs:  (P) 133.6916865935929393    Sepsis (HCC)  Resolved. Secondary to abscess        Subjective   Patient was seen and examined bedside. Patient reports improving right groin pain.     Objective :  Temp:  [97.4 °F (36.3 °C)-97.6 °F (36.4 °C)] 97.6 °F (36.4 °C)  HR:  [76-86] 76  BP: (104-114)/(53-62) 112/56  Resp:  [18-19] 18  SpO2:  [90 %] 90 %  O2 Device: None (Room air)    I/O         05/02 0701 05/03 0700 05/03 0701 05/04 0700  05/04 0701  05/05 0700    P.O. 220 960 180    I.V. (mL/kg) 500 (2.9)      Total Intake(mL/kg) 720 (4.2) 960 (5.6) 180 (1.1)    Other 3514      Total Output 3514      Net -2794 +960 +180           Unmeasured Urine Occurrence  1 x           Lines/Drains/Airways       Active Status       Name Placement date Placement time Site Days    HD Permanent Double Catheter 02/05/25  1609  Internal jugular  87                  Physical Exam  Vitals and nursing note reviewed.   Constitutional:       Appearance: Normal appearance.   HENT:      Head: Normocephalic and atraumatic.      Nose: Nose normal.      Mouth/Throat:      Mouth: Mucous membranes are moist.   Cardiovascular:      Rate and Rhythm: Normal rate.   Pulmonary:      Effort: Pulmonary effort is normal.   Abdominal:      Palpations: Abdomen is soft.   Genitourinary:         Comments: Improving erythema and induration.   Skin:     Capillary Refill: Capillary refill takes less than 2 seconds.   Neurological:      Mental Status: He is alert.           Lab Results: I have reviewed the following results:  Recent Labs     05/02/25  0544 05/02/25  0854 05/03/25  0601   WBC 8.99  --  7.53   HGB 6.0*   < > 8.5*   HCT 18.4*   < > 25.8*     --  288   BANDSPCT  --   --  1   SODIUM 133*  --  133*   K 4.0  --  3.9   CL 89*  --  87*   CO2 25  --  28   BUN 49*  --  36*   CREATININE 9.18*  --  8.05*   GLUC 140  --  116   PHOS 7.0*  --   --     < > = values in this interval not displayed.           VTE Pharmacologic Prophylaxis: VTE covered by:  heparin (porcine), Subcutaneous, 7,500 Units at 05/04/25 0613     VTE Mechanical Prophylaxis: sequential compression device

## 2025-05-04 NOTE — ASSESSMENT & PLAN NOTE
Lab Results   Component Value Date    EGFR 7 05/03/2025    EGFR 6 05/02/2025    EGFR 4 05/01/2025    CREATININE 8.05 (H) 05/03/2025    CREATININE 9.18 (H) 05/02/2025    CREATININE 11.35 (H) 05/01/2025     On HD on Monday Wednesday Thursday Friday  Nephrology following, input appreciated  Continue HD as per nephrology, next HD tomorrow  Continue Phoslo, nephrocaps, torsemide  Repeat lab work in a.m.  FR 1800.

## 2025-05-04 NOTE — ASSESSMENT & PLAN NOTE
Patient presented to ED with pain and swelling of right groin/scrotum. Reported pimple on right scrotum that ruptured with large amount of white drainage.   CT a/p showed extensive subcutaneous inflammatory stranding throughout the right groin, ventral lower pelvic wall, bilateral scrotal sacs suggestive of cellulitis/fasciitis or superficial infection; no findings of necrotizing fasciitis or Santhosh's gangrene; no abscess was noted  Lactate negative, procal 0.86 on admission  s/p I&D in OR 4/30 and found to have large right groin abscess  Wound Cultures with growth of group C beta-hemolytic strep and Prevotella Bivia  Off IV vancomycin and currently on Unasyn  Leukocytosis resolved with no further recurrence of fevers

## 2025-05-04 NOTE — PROGRESS NOTES
Progress Note - Hospitalist   Name: Joaquin Frankel 44 y.o. male I MRN: [id number]  Unit/Bed#: 2 [address] Date of Admission: 4/28/2025   Date of Service: 5/4/2025 I Hospital Day: 4    Assessment & Plan  Abscess of right groin  Patient presented to ED with pain and swelling of right groin/scrotum. Reported pimple on right scrotum that ruptured with large amount of white drainage.   CT a/p showed extensive subcutaneous inflammatory stranding throughout the right groin, ventral lower pelvic wall, bilateral scrotal sacs suggestive of cellulitis/fasciitis or superficial infection; no findings of necrotizing fasciitis or Santhosh's gangrene; no abscess was noted  Lactate negative, procal 0.86 on admission  s/p I&D in OR 4/30 and found to have large right groin abscess  Wound Cultures with growth of group C beta-hemolytic strep and Prevotella Bivia  Off IV vancomycin and currently on Unasyn  Leukocytosis resolved with no further recurrence of fevers  Sepsis (HCC)  Not present on admission as noted by fever and leukocytosis in setting of R groin abscess  Lactate WNL, procal 0.86 on admission  Blood cultures negative so far  Currently on Unasyn as noted above  ESRD (end stage renal disease) on dialysis (HCC)  Lab Results   Component Value Date    EGFR 7 05/03/2025    EGFR 6 05/02/2025    EGFR 4 05/01/2025    CREATININE 8.05 (H) 05/03/2025    CREATININE 9.18 (H) 05/02/2025    CREATININE 11.35 (H) 05/01/2025     On HD on Monday Wednesday Thursday Friday  Nephrology following, input appreciated  Continue HD as per nephrology, next HD tomorrow  Continue Phoslo, nephrocaps, torsemide  Repeat lab work in a.m.  FR 1800.  Anemia of chronic disease  Hgb of 6 on 5/2 is erroneous as repeat H/H was 8.3  Receives Venofer and Mircera outpatient  Repeat lab work in a.m.  Primary hypertension  Continue torsemide  Coreg was decreased to 3.125 mg twice a day and nifedipine held as blood pressures on the lower side at times  hx of  Pulmonary embolism (HCC)  Completed treatment with warfarin.  Type 2 diabetes mellitus (HCC)  Lab Results   Component Value Date    HGBA1C 7.2 (H) 04/30/2025       Recent Labs     05/03/25  1105 05/03/25  1607 05/03/25  2043 05/04/25  0719   POCGLU 159* 118 135 111     Not currently on medication  Continue SSI, Monitor blood glucose.  Hyponatremia  Sodium level 133 on prior lab work.  Continue fluid restriction   Management with dialysis.  Hyperphosphatemia  Continue PhosLo, sevelamer  Metabolic acidosis  BiCarb level improved with higher bicarb bath on HD per nephrology.  Repeat lab work in a.m.  Morbid obesity (HCC)  Body mass index is 57.02 kg/m².   Patient would benefit from dietary and lifestyle changes    VTE Pharmacologic Prophylaxis:    Heparin    Mobility:   Basic Mobility Inpatient Raw Score: 24  JH-HLM Goal: 8: Walk 250 feet or more  JH-HLM Achieved: 7: Walk 25 feet or more  JH-HLM Goal NOT achieved. Continue with multidisciplinary rounding and encourage appropriate mobility to improve upon JH-HLM goals.    Patient Centered Rounds: I performed bedside rounds with nursing staff today.   Discussions with Specialists or Other Care Team Provider: Yes    Education and Discussions with Family / Patient: Yes    Current Length of Stay: 4 day(s)  Current Patient Status: Inpatient   Certification Statement: The patient will continue to require additional inpatient hospital stay due to right groin abscess requiring IV antibiotics and management by surgery  Discharge Plan: Anticipate discharge in 24-48 hrs to home.    Code Status: Level 1 - Full Code    Subjective   Patient states pain continues to improve although not fully resolved    Objective :  Temp:  [97.4 °F (36.3 °C)-97.6 °F (36.4 °C)] 97.6 °F (36.4 °C)  HR:  [76-86] 76  BP: (104-114)/(53-62) 112/56  Resp:  [18-19] 18  SpO2:  [90 %-98 %] 90 %  O2 Device: None (Room air)    Body mass index is 57.02 kg/m².     Input and Output Summary (last 24 hours):      Intake/Output Summary (Last 24 hours) at 5/4/2025 0901  Last data filed at 5/3/2025 1501  Gross per 24 hour   Intake 960 ml   Output --   Net 960 ml       Physical Exam  Vitals reviewed.   Constitutional:       General: He is not in acute distress.     Appearance: He is well-developed. He is not toxic-appearing.   HENT:      Head: Normocephalic and atraumatic.   Cardiovascular:      Rate and Rhythm: Normal rate and regular rhythm.   Pulmonary:      Effort: Pulmonary effort is normal. No respiratory distress.      Breath sounds: Normal breath sounds. No wheezing or rales.   Abdominal:      General: Bowel sounds are normal. There is no distension.      Palpations: Abdomen is soft.      Tenderness: There is no abdominal tenderness.   Musculoskeletal:      Comments: Chronic left lower extremity lymphedema   Neurological:      Mental Status: He is alert and oriented to person, place, and time.         Lines/Drains:  Lines/Drains/Airways       Active Status       Name Placement date Placement time Site Days    HD Permanent Double Catheter 02/05/25  1609  Internal jugular  87                            Lab Results: I have reviewed the following results:   Results from last 7 days   Lab Units 05/03/25  0601   WBC Thousand/uL 7.53   HEMOGLOBIN g/dL 8.5*   HEMATOCRIT % 25.8*   PLATELETS Thousands/uL 288   BANDS PCT % 1   LYMPHO PCT % 16   MONO PCT % 6   EOS PCT % 7*     Results from last 7 days   Lab Units 05/03/25  0601 04/29/25  0527 04/28/25 2024   SODIUM mmol/L 133*   < > 134*   POTASSIUM mmol/L 3.9   < > 4.0   CHLORIDE mmol/L 87*   < > 92*   CO2 mmol/L 28   < > 23   BUN mg/dL 36*   < > 50*   CREATININE mg/dL 8.05*   < > 7.91*   ANION GAP mmol/L 18*   < > 19*   CALCIUM mg/dL 9.3   < > 9.1   ALBUMIN g/dL  --   --  3.6   TOTAL BILIRUBIN mg/dL  --   --  0.24   ALK PHOS U/L  --   --  84   ALT U/L  --   --  15   AST U/L  --   --  12*   GLUCOSE RANDOM mg/dL 116   < > 169*    < > = values in this interval not displayed.      Results from last 7 days   Lab Units 04/28/25  2219   INR  1.08     Results from last 7 days   Lab Units 05/04/25  0719 05/03/25  2043 05/03/25  1607 05/03/25  1105 05/03/25  0717 05/02/25  2104 05/02/25  1548 05/02/25  1100 05/02/25  0725 05/01/25  2015 05/01/25  1610 05/01/25  1048   POC GLUCOSE mg/dl 111 135 118 159* 123 154* 155* 138 140 176* 138 104     Results from last 7 days   Lab Units 04/30/25  0617   HEMOGLOBIN A1C % 7.2*     Results from last 7 days   Lab Units 04/28/25  2317 04/28/25  2219   LACTIC ACID mmol/L  --  0.7   PROCALCITONIN ng/ml 0.86*  --        Recent Cultures (last 7 days):   Results from last 7 days   Lab Units 04/30/25  1223 04/29/25  1357 04/28/25  2221 04/28/25  2219   BLOOD CULTURE   --   --  No Growth After 4 Days. No Growth After 4 Days.   GRAM STAIN RESULT  1+ Gram positive cocci in pairs* No Polys or Bacteria seen  --   --    WOUND CULTURE  2+ Growth of Beta Hemolytic Streptococcus Group C* 3+ Growth of Beta Hemolytic Streptococcus Group C*  --   --        Imaging Results Review: No pertinent imaging studies reviewed.  Other Study Results Review: No additional pertinent studies reviewed.    Last 24 Hours Medication List:     Current Facility-Administered Medications:     acetaminophen (TYLENOL) tablet 650 mg, Q6H PRN    ampicillin-sulbactam (UNASYN) 3 g in sodium chloride 0.9 % 100 mL IVPB, Q24H, Last Rate: 3 g (05/03/25 1619)    aspirin chewable tablet 81 mg, Daily    atorvastatin (LIPITOR) tablet 80 mg, Daily    calcitriol (ROCALTROL) capsule 0.75 mcg, Once per day on Monday Wednesday Friday    calcium acetate (PHOSLO) capsule 1,334 mg, TID    calcium carbonate (TUMS) chewable tablet 500 mg, TID PRN    carvedilol (COREG) tablet 3.125 mg, BID With Meals    Cholecalciferol (VITAMIN D3) tablet 2,000 Units, Daily    docusate sodium (COLACE) capsule 100 mg, BID    heparin (porcine) subcutaneous injection 7,500 Units, Q8H SHAZIA    HYDROmorphone (DILAUDID) injection 0.5 mg, Once     HYDROmorphone (DILAUDID) injection 0.75 mg, Q3H PRN    insulin lispro (HumALOG/ADMELOG) 100 units/mL subcutaneous injection 2-12 Units, TID AC **AND** Fingerstick Glucose (POCT), TID AC    isosorbide mononitrate (IMDUR) 24 hr tablet 60 mg, Daily    [START ON 5/5/2025] metolazone (ZAROXOLYN) tablet 10 mg, Once per day on Monday Thursday    naloxone (NARCAN) 0.04 mg/mL syringe 0.04 mg, Q1MIN PRN    [Held by provider] NIFEdipine (PROCARDIA XL) 24 hr tablet 30 mg, HS    nystatin (MYCOSTATIN) powder, BID    ondansetron (ZOFRAN) injection 4 mg, Q6H PRN    oxyCODONE (ROXICODONE) IR tablet 5 mg, Q4H PRN **OR** oxyCODONE (ROXICODONE) immediate release tablet 10 mg, Q4H PRN    sevelamer (RENAGEL) tablet 800 mg, TID With Meals    torsemide (DEMADEX) tablet 100 mg, Daily    vit B complex-vit C-folic acid (Renal Caps) capsule 1 capsule, Daily With Dinner    Administrative Statements   Today, Patient Was Seen By: Wanda Navarro DO      **Please Note: This note may have been constructed using a voice recognition system.**   (V5) oriented

## 2025-05-04 NOTE — ASSESSMENT & PLAN NOTE
Not present on admission as noted by fever and leukocytosis in setting of R groin abscess  Lactate WNL, procal 0.86 on admission  Blood cultures negative so far  Currently on Unasyn as noted above

## 2025-05-04 NOTE — ASSESSMENT & PLAN NOTE
Hgb of 6 on 5/2 is erroneous as repeat H/H was 8.3  Receives Venofer and Mircera outpatient  Repeat lab work in a.m.

## 2025-05-04 NOTE — ASSESSMENT & PLAN NOTE
Continue torsemide  Coreg was decreased to 3.125 mg twice a day and nifedipine held as blood pressures on the lower side at times

## 2025-05-04 NOTE — ASSESSMENT & PLAN NOTE
Lab Results   Component Value Date    HGBA1C 7.2 (H) 04/30/2025       Recent Labs     05/03/25  1105 05/03/25  1607 05/03/25  2043 05/04/25  0719   POCGLU 159* 118 135 111     Not currently on medication  Continue SSI, Monitor blood glucose.

## 2025-05-04 NOTE — ASSESSMENT & PLAN NOTE
Lab Results   Component Value Date    HGBA1C 7.2 (H) 04/30/2025   Recent hemoglobin A1c 7.2%     Tight glucose control - Humalog    Recent Labs     05/03/25  1105 05/03/25  1607 05/03/25 2043 05/04/25  0719   POCGLU 159* 118 135 111       Blood Sugar Average: Last 72 hrs:  (P) [id number]

## 2025-05-04 NOTE — PLAN OF CARE
Problem: PAIN - ADULT  Goal: Verbalizes/displays adequate comfort level or baseline comfort level  Description: Interventions:- Encourage patient to monitor pain and request assistance- Assess pain using appropriate pain scale- Administer analgesics based on type and severity of pain and evaluate response- Implement non-pharmacological measures as appropriate and evaluate response- Consider cultural and social influences on pain and pain management- Notify physician/advanced practitioner if interventions unsuccessful or patient reports new pain  Outcome: Progressing     Problem: INFECTION - ADULT  Goal: Absence or prevention of progression during hospitalization  Description: INTERVENTIONS:- Assess and monitor for signs and symptoms of infection- Monitor lab/diagnostic results- Monitor all insertion sites, i.e. indwelling lines, tubes, and drains- Monitor endotracheal if appropriate and nasal secretions for changes in amount and color- [place] appropriate cooling/warming therapies per order- Administer medications as ordered- Instruct and encourage patient and family to use good hand hygiene technique- Identify and instruct in appropriate isolation precautions for identified infection/condition  Outcome: Progressing     Problem: METABOLIC, FLUID AND ELECTROLYTES - ADULT  Goal: Electrolytes maintained within normal limits  Description: INTERVENTIONS:- Monitor labs and assess patient for signs and symptoms of electrolyte imbalances- Administer electrolyte replacement as ordered- Monitor response to electrolyte replacements, including repeat lab results as appropriate- Instruct patient on fluid and nutrition as appropriate  Outcome: Progressing     Problem: METABOLIC, FLUID AND ELECTROLYTES - ADULT  Goal: Fluid balance maintained  Description: INTERVENTIONS:- Monitor labs - Monitor I/O and WT- Instruct patient on fluid and nutrition as appropriate- Assess for signs & symptoms of volume excess or deficit  Outcome:  Progressing

## 2025-05-04 NOTE — ASSESSMENT & PLAN NOTE
POD#3 I&D right groin abscess     Induration and erythema improving.     Continue IV antibiotics.   Dressing changed today and re-packed  Continue daily dressing changes.   Patient will need follow up in wound care center upon discharge.   Possible discharge next 24hours

## 2025-05-04 NOTE — PLAN OF CARE
Problem: PAIN - ADULT  Goal: Verbalizes/displays adequate comfort level or baseline comfort level  Description: Interventions:- Encourage patient to monitor pain and request assistance- Assess pain using appropriate pain scale- Administer analgesics based on type and severity of pain and evaluate response- Implement non-pharmacological measures as appropriate and evaluate response- Consider cultural and social influences on pain and pain management- Notify physician/advanced practitioner if interventions unsuccessful or patient reports new pain  Outcome: Progressing     Problem: INFECTION - ADULT  Goal: Absence or prevention of progression during hospitalization  Description: INTERVENTIONS:- Assess and monitor for signs and symptoms of infection- Monitor lab/diagnostic results- Monitor all insertion sites, i.e. indwelling lines, tubes, and drains- Monitor endotracheal if appropriate and nasal secretions for changes in amount and color- [place] appropriate cooling/warming therapies per order- Administer medications as ordered- Instruct and encourage patient and family to use good hand hygiene technique- Identify and instruct in appropriate isolation precautions for identified infection/condition  Outcome: Progressing  Goal: Absence of fever/infection during neutropenic period  Description: INTERVENTIONS:- Monitor WBC  Outcome: Progressing     Problem: SAFETY ADULT  Goal: Patient will remain free of falls  Description: INTERVENTIONS:- Educate patient/family on patient safety including physical limitations- Instruct patient to call for assistance with activity - Consult OT/PT to assist with strengthening/mobility - Keep Call bell within reach- Keep bed low and locked with side rails adjusted as appropriate- Keep care items and personal belongings within reach- Initiate and maintain comfort rounds- Make Fall Risk Sign visible to staff- Offer Toileting every 2 Hours, in advance of need- Initiate/Maintain bed alarm-  Obtain necessary fall risk management equipment: such as chair alarm when OOB- Apply yellow socks and bracelet for high fall risk patients- Consider moving patient to room near nurses station  Outcome: Progressing  Goal: Maintain or return to baseline ADL function  Description: INTERVENTIONS:-  Assess patient's ability to carry out ADLs; assess patient's baseline for ADL function and identify physical deficits which impact ability to perform ADLs (bathing, care of mouth/teeth, toileting, grooming, dressing, etc.)- Assess/evaluate cause of self-care deficits - Assess range of motion- Assess patient's mobility; develop plan if impaired- Assess patient's need for assistive devices and provide as appropriate- Encourage maximum independence but intervene and supervise when necessary- Involve family in performance of ADLs- Assess for home care needs following discharge - Consider OT consult to assist with ADL evaluation and planning for discharge- Provide patient education as appropriate  Outcome: Progressing  Goal: Maintains/Returns to pre admission functional level  Description: INTERVENTIONS:- Perform AM-PAC 6 Click Basic Mobility/ Daily Activity assessment daily.- Set and communicate daily mobility goal to care team and patient/family/caregiver. - Collaborate with rehabilitation services on mobility goals if consulted- Perform Range of Motion 3 times a day.- Reposition patient every 2 hours.- Dangle patient 2 times a day- Stand patient 3 times a day- Ambulate patient 3 times a day- Out of bed to chair 2 times a day - Out of bed for meals 3 times a day- Out of bed for toileting- Record patient progress and toleration of activity level   Outcome: Progressing     Problem: DISCHARGE PLANNING  Goal: Discharge to home or other facility with appropriate resources  Description: INTERVENTIONS:- Identify barriers to discharge w/patient and caregiver- Arrange for needed discharge resources and transportation as appropriate-  Identify discharge learning needs (meds, wound care, etc.)- Arrange for interpretive services to assist at discharge as needed- Refer to Case Management Department for coordinating discharge planning if the patient needs post-hospital services based on physician/advanced practitioner order or complex needs related to functional status, cognitive ability, or social support system  Outcome: Progressing     Problem: Knowledge Deficit  Goal: Patient/family/caregiver demonstrates understanding of disease process, treatment plan, medications, and discharge instructions  Description: Complete learning assessment and assess knowledge base.Interventions:- Provide teaching at level of understanding- Provide teaching via preferred learning methods  Outcome: Progressing     Problem: METABOLIC, FLUID AND ELECTROLYTES - ADULT  Goal: Electrolytes maintained within normal limits  Description: INTERVENTIONS:- Monitor labs and assess patient for signs and symptoms of electrolyte imbalances- Administer electrolyte replacement as ordered- Monitor response to electrolyte replacements, including repeat lab results as appropriate- Instruct patient on fluid and nutrition as appropriate  Outcome: Progressing  Goal: Fluid balance maintained  Description: INTERVENTIONS:- Monitor labs - Monitor I/O and WT- Instruct patient on fluid and nutrition as appropriate- Assess for signs & symptoms of volume excess or deficit  Outcome: Progressing

## 2025-05-05 ENCOUNTER — APPOINTMENT (INPATIENT)
Dept: DIALYSIS | Facility: HOSPITAL | Age: 45
DRG: 602 | End: 2025-05-05
Attending: PHYSICIAN ASSISTANT
Payer: COMMERCIAL

## 2025-05-05 PROBLEM — E87.20 METABOLIC ACIDOSIS: Status: RESOLVED | Noted: 2025-04-29 | Resolved: 2025-05-05

## 2025-05-05 LAB
ANION GAP SERPL CALCULATED.3IONS-SCNC: 21 MMOL/L (ref 4–13)
BUN SERPL-MCNC: 55 MG/DL (ref 5–25)
CALCIUM SERPL-MCNC: 9.1 MG/DL (ref 8.4–10.2)
CHLORIDE SERPL-SCNC: 87 MMOL/L (ref 96–108)
CO2 SERPL-SCNC: 23 MMOL/L (ref 21–32)
CREAT SERPL-MCNC: 11.57 MG/DL (ref 0.6–1.3)
ERYTHROCYTE [DISTWIDTH] IN BLOOD BY AUTOMATED COUNT: 13.6 % (ref 11.6–15.1)
GFR SERPL CREATININE-BSD FRML MDRD: 4 ML/MIN/1.73SQ M
GLUCOSE SERPL-MCNC: 106 MG/DL (ref 65–140)
GLUCOSE SERPL-MCNC: 119 MG/DL (ref 65–140)
GLUCOSE SERPL-MCNC: 137 MG/DL (ref 65–140)
GLUCOSE SERPL-MCNC: 89 MG/DL (ref 65–140)
HCT VFR BLD AUTO: 23.5 % (ref 36.5–49.3)
HGB BLD-MCNC: 7.9 G/DL (ref 12–17)
MCH RBC QN AUTO: 31.9 PG (ref 26.8–34.3)
MCHC RBC AUTO-ENTMCNC: 33.6 G/DL (ref 31.4–37.4)
MCV RBC AUTO: 95 FL (ref 82–98)
PLATELET # BLD AUTO: 308 THOUSANDS/UL (ref 149–390)
PMV BLD AUTO: 9.2 FL (ref 8.9–12.7)
POTASSIUM SERPL-SCNC: 3.9 MMOL/L (ref 3.5–5.3)
RBC # BLD AUTO: 2.48 MILLION/UL (ref 3.88–5.62)
SODIUM SERPL-SCNC: 131 MMOL/L (ref 135–147)
WBC # BLD AUTO: 8.62 THOUSAND/UL (ref 4.31–10.16)

## 2025-05-05 PROCEDURE — 99232 SBSQ HOSP IP/OBS MODERATE 35: CPT | Performed by: FAMILY MEDICINE

## 2025-05-05 PROCEDURE — 90935 HEMODIALYSIS ONE EVALUATION: CPT | Performed by: INTERNAL MEDICINE

## 2025-05-05 PROCEDURE — 80048 BASIC METABOLIC PNL TOTAL CA: CPT | Performed by: INTERNAL MEDICINE

## 2025-05-05 PROCEDURE — 88304 TISSUE EXAM BY PATHOLOGIST: CPT | Performed by: PATHOLOGY

## 2025-05-05 PROCEDURE — 82948 REAGENT STRIP/BLOOD GLUCOSE: CPT

## 2025-05-05 PROCEDURE — 85027 COMPLETE CBC AUTOMATED: CPT | Performed by: INTERNAL MEDICINE

## 2025-05-05 RX ADMIN — HYDROMORPHONE HYDROCHLORIDE 0.75 MG: 1 INJECTION, SOLUTION INTRAMUSCULAR; INTRAVENOUS; SUBCUTANEOUS at 19:44

## 2025-05-05 RX ADMIN — HYDROMORPHONE HYDROCHLORIDE 0.75 MG: 1 INJECTION, SOLUTION INTRAMUSCULAR; INTRAVENOUS; SUBCUTANEOUS at 22:44

## 2025-05-05 RX ADMIN — OXYCODONE HYDROCHLORIDE 10 MG: 10 TABLET ORAL at 17:33

## 2025-05-05 RX ADMIN — AMPICILLIN SODIUM AND SULBACTAM SODIUM 3 G: 2; 1 INJECTION, POWDER, FOR SOLUTION INTRAMUSCULAR; INTRAVENOUS at 17:31

## 2025-05-05 RX ADMIN — OXYCODONE HYDROCHLORIDE 10 MG: 10 TABLET ORAL at 21:27

## 2025-05-05 RX ADMIN — POLYETHYLENE GLYCOL 3350 17 G: 17 POWDER, FOR SOLUTION ORAL at 17:37

## 2025-05-05 RX ADMIN — CALCIUM ACETATE 1334 MG: 667 CAPSULE ORAL at 17:39

## 2025-05-05 RX ADMIN — CALCIUM ACETATE 1334 MG: 667 CAPSULE ORAL at 07:27

## 2025-05-05 RX ADMIN — Medication 1 CAPSULE: at 17:39

## 2025-05-05 RX ADMIN — ASPIRIN 81 MG: 81 TABLET, CHEWABLE ORAL at 17:43

## 2025-05-05 RX ADMIN — ATORVASTATIN CALCIUM 80 MG: 80 TABLET, FILM COATED ORAL at 17:43

## 2025-05-05 RX ADMIN — DOCUSATE SODIUM 100 MG: 100 CAPSULE, LIQUID FILLED ORAL at 17:38

## 2025-05-05 RX ADMIN — HYDROMORPHONE HYDROCHLORIDE 0.75 MG: 1 INJECTION, SOLUTION INTRAMUSCULAR; INTRAVENOUS; SUBCUTANEOUS at 14:52

## 2025-05-05 RX ADMIN — HEPARIN SODIUM 7500 UNITS: 5000 INJECTION INTRAVENOUS; SUBCUTANEOUS at 05:50

## 2025-05-05 RX ADMIN — OXYCODONE HYDROCHLORIDE 10 MG: 10 TABLET ORAL at 05:50

## 2025-05-05 RX ADMIN — OXYCODONE HYDROCHLORIDE 10 MG: 10 TABLET ORAL at 13:26

## 2025-05-05 RX ADMIN — CALCITRIOL CAPSULES 0.25 MCG 0.75 MCG: 0.25 CAPSULE ORAL at 17:43

## 2025-05-05 RX ADMIN — HYDROMORPHONE HYDROCHLORIDE 0.75 MG: 1 INJECTION, SOLUTION INTRAMUSCULAR; INTRAVENOUS; SUBCUTANEOUS at 07:26

## 2025-05-05 RX ADMIN — DOCUSATE SODIUM 100 MG: 100 CAPSULE, LIQUID FILLED ORAL at 17:39

## 2025-05-05 RX ADMIN — CARVEDILOL 3.12 MG: 3.12 TABLET, FILM COATED ORAL at 07:28

## 2025-05-05 RX ADMIN — HYDROMORPHONE HYDROCHLORIDE 0.75 MG: 1 INJECTION, SOLUTION INTRAMUSCULAR; INTRAVENOUS; SUBCUTANEOUS at 00:36

## 2025-05-05 RX ADMIN — HEPARIN SODIUM 3000 UNITS: 1000 INJECTION, SOLUTION INTRAVENOUS; SUBCUTANEOUS at 09:03

## 2025-05-05 NOTE — PLAN OF CARE
Problem: PAIN - ADULT  Goal: Verbalizes/displays adequate comfort level or baseline comfort level  Description: Interventions:- Encourage patient to monitor pain and request assistance- Assess pain using appropriate pain scale- Administer analgesics based on type and severity of pain and evaluate response- Implement non-pharmacological measures as appropriate and evaluate response- Consider cultural and social influences on pain and pain management- Notify physician/advanced practitioner if interventions unsuccessful or patient reports new pain  Outcome: Progressing     Problem: INFECTION - ADULT  Goal: Absence or prevention of progression during hospitalization  Description: INTERVENTIONS:- Assess and monitor for signs and symptoms of infection- Monitor lab/diagnostic results- Monitor all insertion sites, i.e. indwelling lines, tubes, and drains- Monitor endotracheal if appropriate and nasal secretions for changes in amount and color- [place] appropriate cooling/warming therapies per order- Administer medications as ordered- Instruct and encourage patient and family to use good hand hygiene technique- Identify and instruct in appropriate isolation precautions for identified infection/condition  Outcome: Progressing  Goal: Absence of fever/infection during neutropenic period  Description: INTERVENTIONS:- Monitor WBC  Outcome: Progressing     Problem: SAFETY ADULT  Goal: Patient will remain free of falls  Description: INTERVENTIONS:- Educate patient/family on patient safety including physical limitations- Instruct patient to call for assistance with activity - Consult OT/PT to assist with strengthening/mobility - Keep Call bell within reach- Keep bed low and locked with side rails adjusted as appropriate- Keep care items and personal belongings within reach- Initiate and maintain comfort rounds- Make Fall Risk Sign visible to staff- Offer Toileting every 2 Hours, in advance of need- Initiate/Maintain bed alarm-  Obtain necessary fall risk management equipment: such as chair alarm when OOB- Apply yellow socks and bracelet for high fall risk patients- Consider moving patient to room near nurses station  Outcome: Progressing  Goal: Maintain or return to baseline ADL function  Description: INTERVENTIONS:-  Assess patient's ability to carry out ADLs; assess patient's baseline for ADL function and identify physical deficits which impact ability to perform ADLs (bathing, care of mouth/teeth, toileting, grooming, dressing, etc.)- Assess/evaluate cause of self-care deficits - Assess range of motion- Assess patient's mobility; develop plan if impaired- Assess patient's need for assistive devices and provide as appropriate- Encourage maximum independence but intervene and supervise when necessary- Involve family in performance of ADLs- Assess for home care needs following discharge - Consider OT consult to assist with ADL evaluation and planning for discharge- Provide patient education as appropriate  Outcome: Progressing  Goal: Maintains/Returns to pre admission functional level  Description: INTERVENTIONS:- Perform AM-PAC 6 Click Basic Mobility/ Daily Activity assessment daily.- Set and communicate daily mobility goal to care team and patient/family/caregiver. - Collaborate with rehabilitation services on mobility goals if consulted- Perform Range of Motion 2 times a day.- Reposition patient every 2 hours.- Dangle patient 2 times a day- Stand patient 3 times a day- Ambulate patient 3 times a day- Out of bed to chair 3 times a day - Out of bed for meals 3 times a day- Out of bed for toileting- Record patient progress and toleration of activity level   Outcome: Progressing     Problem: DISCHARGE PLANNING  Goal: Discharge to home or other facility with appropriate resources  Description: INTERVENTIONS:- Identify barriers to discharge w/patient and caregiver- Arrange for needed discharge resources and transportation as appropriate-  Identify discharge learning needs (meds, wound care, etc.)- Arrange for interpretive services to assist at discharge as needed- Refer to Case Management Department for coordinating discharge planning if the patient needs post-hospital services based on physician/advanced practitioner order or complex needs related to functional status, cognitive ability, or social support system  Outcome: Progressing     Problem: Knowledge Deficit  Goal: Patient/family/caregiver demonstrates understanding of disease process, treatment plan, medications, and discharge instructions  Description: Complete learning assessment and assess knowledge base.Interventions:- Provide teaching at level of understanding- Provide teaching via preferred learning methods  Outcome: Progressing     Problem: METABOLIC, FLUID AND ELECTROLYTES - ADULT  Goal: Electrolytes maintained within normal limits  Description: INTERVENTIONS:- Monitor labs and assess patient for signs and symptoms of electrolyte imbalances- Administer electrolyte replacement as ordered- Monitor response to electrolyte replacements, including repeat lab results as appropriate- Instruct patient on fluid and nutrition as appropriate  Outcome: Progressing  Goal: Fluid balance maintained  Description: INTERVENTIONS:- Monitor labs - Monitor I/O and WT- Instruct patient on fluid and nutrition as appropriate- Assess for signs & symptoms of volume excess or deficit  Outcome: Progressing

## 2025-05-05 NOTE — ASSESSMENT & PLAN NOTE
Blood pressure is currently acceptable  Continue carvedilol 3.125 mg twice daily and Imdur 60 mg daily  Continue torsemide 100 mg daily and metolazone 10 mg 2 times per week  Continue to hold nifedipine

## 2025-05-05 NOTE — ASSESSMENT & PLAN NOTE
Not present on admission as noted by fever and leukocytosis in setting of R groin abscess  Lactate WNL, procal 0.86 on admission  Blood cultures negative so far  Currently on Unasyn as noted above.

## 2025-05-05 NOTE — ASSESSMENT & PLAN NOTE
Continue phosphorus binders  Continue low phosphorus diet  Continue calcitriol for secondary hyperparathyroidism

## 2025-05-05 NOTE — ASSESSMENT & PLAN NOTE
Continue torsemide, metolazone, Imdur  Coreg was decreased to 3.125 mg twice a day   nifedipine discontinued as blood pressures on the lower side at times

## 2025-05-05 NOTE — ASSESSMENT & PLAN NOTE
Hgb of 6 on 5/2 is erroneous as repeat H/H was 8.3  Hemoglobin overall stable.  Receives Venofer and Mircera outpatient  Repeat lab work in a.m

## 2025-05-05 NOTE — ASSESSMENT & PLAN NOTE
Patient presented to ED with pain and swelling of right groin/scrotum. Reported pimple on right scrotum that ruptured with large amount of white drainage.   CT a/p showed extensive subcutaneous inflammatory stranding throughout the right groin, ventral lower pelvic wall, bilateral scrotal sacs suggestive of cellulitis/fasciitis or superficial infection; no findings of necrotizing fasciitis or Santhosh's gangrene; no abscess was noted  Lactate negative, procal 0.86 on admission  s/p I&D in OR 4/30 and found to have large right groin abscess  Wound Cultures with growth of group C beta-hemolytic strep and Prevotella Bivia  Off IV vancomycin and was on Unasyn, transition to 1 more day of Augmentin on discharge to complete a 7-day course post I&D  Leukocytosis resolved with no further recurrence of fevers  Follow-up at the wound care center after discharge with no surgical follow-up needed per surgery

## 2025-05-05 NOTE — QUICK NOTE
Packing changed at bedside this morning. Wound has minimal drainage and is healing appropriately. Patient can follow up in wound care center.

## 2025-05-05 NOTE — ASSESSMENT & PLAN NOTE
Not present on admission as noted by fever and leukocytosis in setting of R groin abscess  Lactate WNL, procal 0.86 on admission  Blood cultures negative   Completed 6 days of Unasyn and transition to 1 day of Augmentin on discharge to complete a 7-day course

## 2025-05-05 NOTE — ASSESSMENT & PLAN NOTE
Lab Results   Component Value Date    EGFR 4 05/05/2025    EGFR 7 05/03/2025    EGFR 6 05/02/2025    CREATININE 11.57 (H) 05/05/2025    CREATININE 8.05 (H) 05/03/2025    CREATININE 9.18 (H) 05/02/2025     On HD on Monday Wednesday Thursday Friday  Nephrology following, input appreciated  Continue HD as per nephrology  Continue Phoslo, nephrocaps, torsemide  IR consulted for further evaluation due to poorly functioning HD catheter  Repeat lab work in a.m.  FR 1800.

## 2025-05-05 NOTE — ASSESSMENT & PLAN NOTE
MWF at [facility]   kg  Access: Right IJ permacath  Required Cathflo on 04/30  Continue IV heparin on dialysis  HD today    HEMODIALYSIS PROCEDURE NOTE  The patient was seen and examined on hemodialysis.  Lines were reversed.  Unable to aspirate from red port.  IR has been consulted for evaluation of permacath.  Time: 4 hours  Sodium: 138 Blood flow: 400   Dialyzer: F160 Potassium: 3 Dialysate flow: 800   Access: R TDC Bicarbonate: 40 Ultrafiltration goal: 4 L   Medications on HD: None

## 2025-05-05 NOTE — PROGRESS NOTES
Progress Note - Hospitalist   Name: Joaquin Frankel 44 y.o. male I MRN: [id number]  Unit/Bed#: 2 [address] Date of Admission: 4/28/2025   Date of Service: 5/5/2025 I Hospital Day: 5    Assessment & Plan  Abscess of right groin  Patient presented to ED with pain and swelling of right groin/scrotum. Reported pimple on right scrotum that ruptured with large amount of white drainage.   CT a/p showed extensive subcutaneous inflammatory stranding throughout the right groin, ventral lower pelvic wall, bilateral scrotal sacs suggestive of cellulitis/fasciitis or superficial infection; no findings of necrotizing fasciitis or Santhosh's gangrene; no abscess was noted  Lactate negative, procal 0.86 on admission  s/p I&D in OR 4/30 and found to have large right groin abscess  Wound Cultures with growth of group C beta-hemolytic strep and Prevotella Bivia  Off IV vancomycin and currently on Unasyn.  Will treat for 7 days post OR  Leukocytosis resolved with no further recurrence of fevers  Sepsis (HCC)  Not present on admission as noted by fever and leukocytosis in setting of R groin abscess  Lactate WNL, procal 0.86 on admission  Blood cultures negative so far  Currently on Unasyn as noted above.  ESRD (end stage renal disease) on dialysis (HCC)  Lab Results   Component Value Date    EGFR 4 05/05/2025    EGFR 7 05/03/2025    EGFR 6 05/02/2025    CREATININE 11.57 (H) 05/05/2025    CREATININE 8.05 (H) 05/03/2025    CREATININE 9.18 (H) 05/02/2025     On HD on Monday Wednesday Thursday Friday  Nephrology following, input appreciated  Continue HD as per nephrology  Continue Phoslo, nephrocaps, torsemide  IR consulted for further evaluation due to poorly functioning HD catheter  Repeat lab work in a.m.  FR 1800.  Anemia of chronic disease  Hgb of 6 on 5/2 is erroneous as repeat H/H was 8.3  Hemoglobin overall stable.  Receives Venofer and Mircera outpatient  Repeat lab work in a.m  Primary hypertension  Continue torsemide  Coreg  was decreased to 3.125 mg twice a day and nifedipine held as blood pressures on the lower side at times  Continue torsemide, metolazone, Imdur  hx of Pulmonary embolism (HCC)  Completed treatment with warfarin  Type 2 diabetes mellitus (HCC)  Lab Results   Component Value Date    HGBA1C 7.2 (H) 04/30/2025       Recent Labs     05/04/25  1536 05/04/25  2038 05/05/25  0734 05/05/25  1041   POCGLU 129 110 106 137     Not currently on medication.  Continue SSI, Monitor blood glucose.  Hyponatremia  Sodium level 131 today.  Continue fluid restriction   Management with dialysis.  Hyperphosphatemia  Continue PhosLo, sevelamer.  Metabolic acidosis (Resolved: 5/5/2025)  BiCarb level improved with higher bicarb bath on HD per nephrology.  Morbid obesity (HCC)  Body mass index is 57.02 kg/m².   Patient would benefit from dietary and lifestyle changes    VTE Pharmacologic Prophylaxis:    Heparin    Mobility:   Basic Mobility Inpatient Raw Score: 24  JH-HLM Goal: 8: Walk 250 feet or more  JH-HLM Achieved: 3: Sit at edge of bed  JH-HLM Goal NOT achieved. Continue with multidisciplinary rounding and encourage appropriate mobility to improve upon JH-HLM goals.    Patient Centered Rounds: I performed bedside rounds with nursing staff today.   Discussions with Specialists or Other Care Team Provider: Yes-nephrology    Education and Discussions with Family / Patient: Yes    Current Length of Stay: 5 day(s)  Current Patient Status: Inpatient   Certification Statement: The patient will continue to require additional inpatient hospital stay due to ESRD with poorly functioning HD catheter requiring IR evaluation  Discharge Plan: Anticipate discharge tomorrow to home.    Code Status: Level 1 - Full Code    Subjective   Patient states pain along the groin area continues to improve although not fully resolved    Objective :  Temp:  [97.6 °F (36.4 °C)-98 °F (36.7 °C)] 97.6 °F (36.4 °C)  HR:  [60-89] 66  BP: (106-138)/(54-82) 106/70  Resp:   [16-22] 18  SpO2:  [90 %-93 %] 90 %  O2 Device: None (Room air)    Body mass index is 57.02 kg/m².     Input and Output Summary (last 24 hours):     Intake/Output Summary (Last 24 hours) at 5/5/2025 1117  Last data filed at 5/5/2025 0900  Gross per 24 hour   Intake 740 ml   Output --   Net 740 ml       Physical Exam  Vitals reviewed.   Constitutional:       General: He is not in acute distress.     Appearance: He is not toxic-appearing.   HENT:      Head: Normocephalic and atraumatic.   Eyes:      General:         Right eye: No discharge.         Left eye: No discharge.   Cardiovascular:      Rate and Rhythm: Normal rate and regular rhythm.   Pulmonary:      Effort: Pulmonary effort is normal. No respiratory distress.      Breath sounds: Normal breath sounds. No wheezing or rales.   Abdominal:      General: Bowel sounds are normal. There is no distension.      Palpations: Abdomen is soft.      Tenderness: There is no abdominal tenderness.   Musculoskeletal:      Comments: Left lower extremity chronic lymphedema   Neurological:      Mental Status: He is alert and oriented to person, place, and time.         Lines/Drains:  Lines/Drains/Airways       Active Status       Name Placement date Placement time Site Days    HD Permanent Double Catheter 02/05/25  1609  Internal jugular  88                            Lab Results: I have reviewed the following results:   Results from last 7 days   Lab Units 05/05/25  0557 05/03/25  0601   WBC Thousand/uL 8.62 7.53   HEMOGLOBIN g/dL 7.9* 8.5*   HEMATOCRIT % 23.5* 25.8*   PLATELETS Thousands/uL 308 288   BANDS PCT %  --  1   LYMPHO PCT %  --  16   MONO PCT %  --  6   EOS PCT %  --  7*     Results from last 7 days   Lab Units 05/05/25  0557 04/29/25  0527 04/28/25 2024   SODIUM mmol/L 131*   < > 134*   POTASSIUM mmol/L 3.9   < > 4.0   CHLORIDE mmol/L 87*   < > 92*   CO2 mmol/L 23   < > 23   BUN mg/dL 55*   < > 50*   CREATININE mg/dL 11.57*   < > 7.91*   ANION GAP mmol/L 21*   < >  19*   CALCIUM mg/dL 9.1   < > 9.1   ALBUMIN g/dL  --   --  3.6   TOTAL BILIRUBIN mg/dL  --   --  0.24   ALK PHOS U/L  --   --  84   ALT U/L  --   --  15   AST U/L  --   --  12*   GLUCOSE RANDOM mg/dL 89   < > 169*    < > = values in this interval not displayed.     Results from last 7 days   Lab Units 04/28/25  2219   INR  1.08     Results from last 7 days   Lab Units 05/05/25  1041 05/05/25  0734 05/04/25  2038 05/04/25  1536 05/04/25  1103 05/04/25  0719 05/03/25  2043 05/03/25  1607 05/03/25  1105 05/03/25  0717 05/02/25  2104 05/02/25  1548   POC GLUCOSE mg/dl 137 106 110 129 116 111 135 118 159* 123 154* 155*     Results from last 7 days   Lab Units 04/30/25  0617   HEMOGLOBIN A1C % 7.2*     Results from last 7 days   Lab Units 04/28/25  2317 04/28/25  2219   LACTIC ACID mmol/L  --  0.7   PROCALCITONIN ng/ml 0.86*  --        Recent Cultures (last 7 days):   Results from last 7 days   Lab Units 04/30/25  1223 04/29/25  1357 04/28/25  2221 04/28/25  2219   BLOOD CULTURE   --   --  No Growth After 5 Days. No Growth After 5 Days.   GRAM STAIN RESULT  1+ Gram positive cocci in pairs* No Polys or Bacteria seen  --   --    WOUND CULTURE  2+ Growth of Beta Hemolytic Streptococcus Group C* 3+ Growth of Beta Hemolytic Streptococcus Group C*  --   --        Imaging Results Review: No pertinent imaging studies reviewed.  Other Study Results Review: No additional pertinent studies reviewed.    Last 24 Hours Medication List:     Current Facility-Administered Medications:     acetaminophen (TYLENOL) tablet 650 mg, Q6H PRN    ampicillin-sulbactam (UNASYN) 3 g in sodium chloride 0.9 % 100 mL IVPB, Q24H, Last Rate: 3 g (05/04/25 1628)    aspirin chewable tablet 81 mg, Daily    atorvastatin (LIPITOR) tablet 80 mg, Daily    calcitriol (ROCALTROL) capsule 0.75 mcg, Once per day on Monday Wednesday Friday    calcium acetate (PHOSLO) capsule 1,334 mg, TID    calcium carbonate (TUMS) chewable tablet 500 mg, TID PRN    carvedilol  (COREG) tablet 3.125 mg, BID With Meals    Cholecalciferol (VITAMIN D3) tablet 2,000 Units, Daily    docusate sodium (COLACE) capsule 100 mg, BID    heparin (porcine) subcutaneous injection 7,500 Units, Q8H SHAZIA    HYDROmorphone (DILAUDID) injection 0.5 mg, Once    HYDROmorphone (DILAUDID) injection 0.75 mg, Q3H PRN    insulin lispro (HumALOG/ADMELOG) 100 units/mL subcutaneous injection 2-12 Units, TID AC **AND** Fingerstick Glucose (POCT), TID AC    isosorbide mononitrate (IMDUR) 24 hr tablet 60 mg, Daily    metolazone (ZAROXOLYN) tablet 10 mg, Once per day on Monday Thursday    naloxone (NARCAN) 0.04 mg/mL syringe 0.04 mg, Q1MIN PRN    [Held by provider] NIFEdipine (PROCARDIA XL) 24 hr tablet 30 mg, HS    nystatin (MYCOSTATIN) powder, BID    ondansetron (ZOFRAN) injection 4 mg, Q6H PRN    oxyCODONE (ROXICODONE) IR tablet 5 mg, Q4H PRN **OR** oxyCODONE (ROXICODONE) immediate release tablet 10 mg, Q4H PRN    polyethylene glycol (MIRALAX) packet 17 g, BID    sevelamer (RENAGEL) tablet 800 mg, TID With Meals    torsemide (DEMADEX) tablet 100 mg, Daily    vit B complex-vit C-folic acid (Renal Caps) capsule 1 capsule, Daily With Dinner    Administrative Statements   Today, Patient Was Seen By: Wanda Navarro DO      **Please Note: This note may have been constructed using a voice recognition system.**

## 2025-05-05 NOTE — ASSESSMENT & PLAN NOTE
Lab Results   Component Value Date    HGBA1C 7.2 (H) 04/30/2025       Recent Labs     05/04/25  1536 05/04/25  2038 05/05/25  0734 05/05/25  1041   POCGLU 129 110 106 137     Not currently on medication.  Continue SSI, Monitor blood glucose.

## 2025-05-05 NOTE — PROGRESS NOTES
NEPHROLOGY HOSPITAL PROGRESS NOTE   Joaquin Frankel 44 y.o. male MRN: [id number]  Unit/Bed#: 2 [place] [address] Encounter: [id number]  Reason for Consult: ESRD on HD  Assessment & Plan  ESRD (end stage renal disease) on dialysis (HCC)  MWF at [facility]   kg  Access: Right IJ permacath  Required Cathflo on 04/30  Continue IV heparin on dialysis  HD today    HEMODIALYSIS PROCEDURE NOTE  The patient was seen and examined on hemodialysis.  Lines were reversed.  Unable to aspirate from red port.  IR has been consulted for evaluation of permacath.  Time: 4 hours  Sodium: 138 Blood flow: 400   Dialyzer: F160 Potassium: 3 Dialysate flow: 800   Access: R TDC Bicarbonate: 40 Ultrafiltration goal: 4 L   Medications on HD: None     Primary hypertension  Blood pressure is currently acceptable  Continue carvedilol 3.125 mg twice daily and Imdur 60 mg daily  Continue torsemide 100 mg daily and metolazone 10 mg 2 times per week  Continue to hold nifedipine  Anemia of chronic disease  Hemoglobin today 7.9  Not on JOSSELYN due to history of PE  Hyperphosphatemia  Continue phosphorus binders  Continue low phosphorus diet  Continue calcitriol for secondary hyperparathyroidism  Hyponatremia  Continue fluid restriction and ultrafiltration on dialysis  Abscess of right groin  Status post I&D  Management per primary team and surgery team  hx of Pulmonary embolism (HCC)  Noted, continue anticoagulation per primary team  Type 2 diabetes mellitus (HCC)  Management per primary team    I have reviewed the nephrology recommendations including dialysis today with use of heparin to prevent system clotting, with internal medicine team, and we are in agreement with renal plan including the information outlined above.    SUBJECTIVE / 24H INTERVAL HISTORY:  Denies dyspnea.  Has chronic leg swelling on left side due to lymphedema.    OBJECTIVE:  Current Weight: Weight - Scale: (!) 170 kg (375 lb)  Vitals:    05/04/25 1446 05/04/25 1923 05/04/25 2341  05/05/25 0712   BP: 138/70 121/61 135/66 136/66   BP Location: Right arm Right arm Right arm Right arm   Pulse: 89 84 80    Resp: 22 19 18 18   Temp: 97.8 °F (36.6 °C) 97.9 °F (36.6 °C) 98 °F (36.7 °C)    TempSrc: Oral Oral Oral    SpO2: 93%   90%   Weight:       Height:           Intake/Output Summary (Last 24 hours) at 5/5/2025 0842  Last data filed at 5/4/2025 1800  Gross per 24 hour   Intake 540 ml   Output --   Net 540 ml     Review of Systems   Constitutional:  Negative for chills and fever.   HENT:  Negative for ear pain and sore throat.    Eyes:  Negative for pain and visual disturbance.   Respiratory:  Negative for cough and shortness of breath.    Cardiovascular:  Negative for chest pain and palpitations.   Gastrointestinal:  Negative for abdominal pain and vomiting.   Genitourinary:  Negative for dysuria and hematuria.   Musculoskeletal:  Negative for arthralgias and back pain.   Skin:  Negative for color change and rash.   Neurological:  Negative for seizures and syncope.   All other systems reviewed and are negative.    Physical Exam  Vitals and nursing note reviewed.   Constitutional:       General: He is not in acute distress.     Appearance: He is well-developed.   HENT:      Head: Normocephalic and atraumatic.   Eyes:      Conjunctiva/sclera: Conjunctivae normal.   Cardiovascular:      Rate and Rhythm: Normal rate and regular rhythm.      Heart sounds: No murmur heard.     Comments: Right chest wall permacath present  Pulmonary:      Effort: Pulmonary effort is normal. No respiratory distress.      Breath sounds: Normal breath sounds.   Abdominal:      Palpations: Abdomen is soft.      Tenderness: There is no abdominal tenderness.   Musculoskeletal:         General: No swelling.      Cervical back: Neck supple.      Right lower leg: No edema.      Left lower leg: Edema present.   Skin:     General: Skin is warm and dry.      Capillary Refill: Capillary refill takes less than 2 seconds.    Neurological:      Mental Status: He is alert.   Psychiatric:         Mood and Affect: Mood normal.         Medications:    Current Facility-Administered Medications:     acetaminophen (TYLENOL) tablet 650 mg, 650 mg, Oral, Q6H PRN, Tim Humphrey PA-C, 650 mg at 05/01/25 1650    ampicillin-sulbactam (UNASYN) 3 g in sodium chloride 0.9 % 100 mL IVPB, 3 g, Intravenous, Q24H, Wanda Navarro DO, Last Rate: 200 mL/hr at 05/04/25 1628, 3 g at 05/04/25 1628    aspirin chewable tablet 81 mg, 81 mg, Oral, Daily, Tmi Humphrey PA-C, 81 mg at 05/04/25 0823    atorvastatin (LIPITOR) tablet 80 mg, 80 mg, Oral, Daily, Tim Humphrey PA-C, 80 mg at 05/04/25 0822    calcitriol (ROCALTROL) capsule 0.75 mcg, 0.75 mcg, Oral, Once per day on Monday Wednesday Friday, Tim Humphrey PA-C, 0.75 mcg at 05/02/25 1228    calcium acetate (PHOSLO) capsule 1,334 mg, 1,334 mg, Oral, TID, Tim Humphrey PA-C, 1,334 mg at 05/05/25 0727    calcium carbonate (TUMS) chewable tablet 500 mg, 500 mg, Oral, TID PRN, Joanne Anguiano MD, 500 mg at 05/04/25 2100    carvedilol (COREG) tablet 3.125 mg, 3.125 mg, Oral, BID With Meals, Johnny Viramontes MD, 3.125 mg at 05/05/25 0728    Cholecalciferol (VITAMIN D3) tablet 2,000 Units, 2,000 Units, Oral, Daily, Tim Humphrey PA-C, 2,000 Units at 05/04/25 0822    docusate sodium (COLACE) capsule 100 mg, 100 mg, Oral, BID, Tim Humphrey PA-C, 100 mg at 05/04/25 1802    heparin (porcine) injection 3,000 Units, 3,000 Units, Intravenous, Once, Johnny Viramontes MD    heparin (porcine) subcutaneous injection 7,500 Units, 7,500 Units, Subcutaneous, Q8H SHAZIA, Tim Humphrey PA-C, 7,500 Units at 05/05/25 0550    HYDROmorphone (DILAUDID) injection 0.5 mg, 0.5 mg, Intravenous, Once, Wanda Revankar, DO    HYDROmorphone (DILAUDID) injection 0.75 mg, 0.75 mg, Intravenous, Q3H PRN, Wanda Revshanear, DO, 0.75 mg at 05/05/25 0726    insulin lispro (HumALOG/ADMELOG) 100 units/mL subcutaneous injection 2-12 Units, 2-12 Units, Subcutaneous,  TID AC, 2 Units at 05/03/25 1118 **AND** Fingerstick Glucose (POCT), , , TID AC, Tim Humphrey PA-C    isosorbide mononitrate (IMDUR) 24 hr tablet 60 mg, 60 mg, Oral, Daily, Tim Humphrey PA-C, 60 mg at 05/04/25 0823    metolazone (ZAROXOLYN) tablet 10 mg, 10 mg, Oral, Once per day on Monday Thursday, PETRA Ortiz    naloxone (NARCAN) 0.04 mg/mL syringe 0.04 mg, 0.04 mg, Intravenous, Q1MIN PRN, Tim Humphrey PA-C    [Held by provider] NIFEdipine (PROCARDIA XL) 24 hr tablet 30 mg, 30 mg, Oral, HS, Tim Humphrey PA-C, 30 mg at 05/01/25 2107    nystatin (MYCOSTATIN) powder, , Topical, BID, Wanda Revankar, DO, Given at 05/03/25 0906    ondansetron (ZOFRAN) injection 4 mg, 4 mg, Intravenous, Q6H PRN, Tim Humphrey PA-C, 4 mg at 05/02/25 1358    oxyCODONE (ROXICODONE) IR tablet 5 mg, 5 mg, Oral, Q4H PRN, 5 mg at 05/02/25 0813 **OR** oxyCODONE (ROXICODONE) immediate release tablet 10 mg, 10 mg, Oral, Q4H PRN, Tim Humphrey PA-C, 10 mg at 05/05/25 0550    polyethylene glycol (MIRALAX) packet 17 g, 17 g, Oral, BID, Wanda Revankar, DO, 17 g at 05/04/25 1627    sevelamer (RENAGEL) tablet 800 mg, 800 mg, Oral, TID With Meals, Tim Humphrey PA-C, 800 mg at 05/03/25 1618    torsemide (DEMADEX) tablet 100 mg, 100 mg, Oral, Daily, Tim Humphrey PA-C, 100 mg at 05/04/25 0822    vit B complex-vit C-folic acid (Renal Caps) capsule 1 capsule, 1 mg, Oral, Daily With Dinner, Tim Humphrey PA-C, 1 capsule at 05/04/25 1615    Laboratory Results:  Results from last 7 days   Lab Units 05/05/25  0557 05/03/25  0601 05/02/25  0854 05/02/25  0544 05/01/25  0430 04/30/25  0617 04/29/25  0527 04/28/25 2024   WBC Thousand/uL 8.62 7.53  --  8.99 7.68 12.69* 9.82 10.66*   HEMOGLOBIN g/dL 7.9* 8.5* 8.3* 6.0* 8.1* 9.1* 9.4* 9.3*   HEMATOCRIT % 23.5* 25.8* 24.8* 18.4* 25.3* 27.8* 28.9* 28.0*   PLATELETS Thousands/uL 308 288  --  231 294 361 352 336   POTASSIUM mmol/L 3.9 3.9  --  4.0 4.8 5.2 4.2 4.0   CHLORIDE mmol/L 87* 87*  --  89* 93*  "94* 92* 92*   CO2 mmol/L 23 28  --  25 20* 20* 20* 23   BUN mg/dL 55* 36*  --  49* 74* 71* 57* 50*   CREATININE mg/dL 11.57* 8.05*  --  9.18* 11.35* 10.38* 8.67* 7.91*   CALCIUM mg/dL 9.1 9.3  --  8.9 8.8 9.4 8.9 9.1   PHOSPHORUS mg/dL  --   --   --  7.0*  --   --   --   --        Portions of the record may have been created with voice recognition software. Occasional wrong word or \"sound a like\" substitutions may have occurred due to the inherent limitations of voice recognition software. Read the chart carefully and recognize, using context, where substitutions have occurred.If you have any questions, please contact the dictating provider.    "

## 2025-05-05 NOTE — DISCHARGE SUMMARY
Discharge Summary - Hospitalist   Name: Joaquin Frankel 44 y.o. male I MRN: [id number]  Unit/Bed#: [address] [place] [address] Date of Admission: 4/28/2025   Date of Service: 5/6/2025 I Hospital Day: 6     Assessment & Plan  Abscess of right groin  Patient presented to ED with pain and swelling of right groin/scrotum. Reported pimple on right scrotum that ruptured with large amount of white drainage.   CT a/p showed extensive subcutaneous inflammatory stranding throughout the right groin, ventral lower pelvic wall, bilateral scrotal sacs suggestive of cellulitis/fasciitis or superficial infection; no findings of necrotizing fasciitis or Santhosh's gangrene; no abscess was noted  Lactate negative, procal 0.86 on admission  s/p I&D in OR 4/30 and found to have large right groin abscess  Wound Cultures with growth of group C beta-hemolytic strep and Prevotella Bivia  Off IV vancomycin and was on Unasyn, transition to 1 more day of Augmentin on discharge to complete a 7-day course post I&D  Leukocytosis resolved with no further recurrence of fevers  Follow-up at the wound care center after discharge with no surgical follow-up needed per surgery  Sepsis (HCC) (Resolved: 5/6/2025)  Not present on admission as noted by fever and leukocytosis in setting of R groin abscess  Lactate WNL, procal 0.86 on admission  Blood cultures negative   Completed 6 days of Unasyn and transition to 1 day of Augmentin on discharge to complete a 7-day course  ESRD (end stage renal disease) on dialysis (HCC)  Lab Results   Component Value Date    EGFR 6 05/06/2025    EGFR 4 05/05/2025    EGFR 7 05/03/2025    CREATININE 9.13 (H) 05/06/2025    CREATININE 11.57 (H) 05/05/2025    CREATININE 8.05 (H) 05/03/2025     On HD on Monday Wednesday Thursday Friday  Nephrology following, input appreciated  Continue HD on discharge  Continue home meds  Status post new tunneled dialysis catheter placement today by IR due to ongoing issues with aspirating one of the  ports  FR 1800.  Anemia of chronic disease  Hgb of 6 on 5/2 is erroneous as repeat H/H was 8.3  Receives Venofer and Mircera outpatient  Hemoglobin today 9.4  Primary hypertension  Continue torsemide, metolazone, Imdur  Coreg was decreased to 3.125 mg twice a day   nifedipine discontinued as blood pressures on the lower side at times  hx of Pulmonary embolism (HCC)  Completed treatment with warfarin  Type 2 diabetes mellitus (HCC)  Lab Results   Component Value Date    HGBA1C 7.2 (H) 04/30/2025       Recent Labs     05/05/25  1041 05/05/25  1551 05/06/25  0743 05/06/25  1051   POCGLU 137 119 106 114     Not currently on medication.  Diabetic diet  Hyponatremia  Sodium level 130 today.  Continue fluid restriction   Management with dialysis.  Hyperphosphatemia  Continue PhosLo, sevelamer, calcitriol  Metabolic acidosis (Resolved: 5/5/2025)  BiCarb level improved with higher bicarb bath on HD per nephrology.  Morbid obesity (HCC)  Body mass index is 57.02 kg/m².   Patient would benefit from dietary and lifestyle changes.     Medical Problems       Resolved Problems  Date Reviewed: 4/30/2025          Resolved    Sepsis (HCC) 5/6/2025     Resolved by  Wanda Navarro DO    Metabolic acidosis 5/5/2025     Resolved by  Wanda Navarro DO        Discharging Physician / Practitioner: Wanda Navarro DO  PCP: Hany Mcfarland DO  Admission Date:   Admission Orders (From admission, onward)       Ordered        04/30/25 1152  INPATIENT ADMISSION  Once            04/28/25 2322  Place in Observation  Once                          Discharge Date: 05/06/25    Consultations During Hospital Stay:  Surgery  Nephrology  IR    Procedures Performed:   I&D of right groin abscess  New tunneled dialysis catheter placement    Significant Findings / Test Results:   see above    Incidental Findings:   None    Test Results Pending at Discharge (will require follow up):   None none     Outpatient Tests Requested:  None    Complications:  "None    Reason for Admission: Right groin abscess    Hospital Course:   Joaquin Frankel is a 44 y.o. male patient who originally presented to the hospital on 4/28/2025 due to Pimple/abscess along the right groin area which started about 3 days prior to admission.  Per patient one of them ruptured and he started having increased pain.  Patient was utilizing warm compresses to help with the pain and discomfort without relief.  Patient was admitted and treated with IV antibiotic and seen by surgery.  Patient underwent I&D in the OR.  Antibiotic was changed to Unasyn with improvement.  Patient was also seen by nephrology while here and underwent dialysis per them.  Patient cleared by all consultants involved in his care prior to discharge.  Discharge plan was discussed with patient    Please see above list of diagnoses and related plan for additional information.     Condition at Discharge: stable    Discharge Day Visit / Exam:   Subjective: Patient states pain slowly continues to improve and the right groin area is not as painful as it was on day of admission.  States current pain regimen is helping with pain  Vitals: Blood Pressure: 111/91 (05/06/25 1536)  Pulse: 59 (05/06/25 1425)  Temperature: (!) 97.4 °F (36.3 °C) (05/06/25 1536)  Temp Source: Oral (05/05/25 1253)  Respirations: 17 (05/06/25 1425)  Height: 5' 8\" (172.7 cm) (04/29/25 0100)  Weight - Scale: (!) 170 kg (375 lb) (04/29/25 0100)  SpO2: 96 % (05/06/25 1536)  Physical Exam  Vitals reviewed.   Constitutional:       General: He is not in acute distress.     Appearance: He is not toxic-appearing.   HENT:      Head: Normocephalic and atraumatic.   Eyes:      General: No scleral icterus.  Cardiovascular:      Rate and Rhythm: Normal rate and regular rhythm.   Pulmonary:      Effort: Pulmonary effort is normal. No respiratory distress.      Breath sounds: Normal breath sounds.   Abdominal:      General: Bowel sounds are normal. There is no distension.      " Palpations: Abdomen is soft.      Tenderness: There is no abdominal tenderness.   Musculoskeletal:      Left lower leg: Edema (chronic lymphedema) present.   Neurological:      Mental Status: He is alert and oriented to person, place, and time.             Discharge instructions/Information to patient and family:   See after visit summary for information provided to patient and family.      Provisions for Follow-Up Care:  See after visit summary for information related to follow-up care and any pertinent home health orders.      Mobility at time of Discharge:   Basic Mobility Inpatient Raw Score: 24  JH-HLM Goal: 8: Walk 250 feet or more  JH-HLM Achieved: 3: Sit at edge of bed  HLM Goal NOT achieved. Continue to encourage mobility in post discharge setting.     Disposition:   Home    Planned Readmission: No    Discharge Medications:  See after visit summary for reconciled discharge medications provided to patient and/or family.      Administrative Statements   Discharge Statement:  I have spent a total time of > 30 minutes in caring for this patient on the day of the visit/encounter. >30 minutes of time was spent on: Instructions for management, Patient and family education, Impressions, Counseling / Coordination of care, Documenting in the medical record, Reviewing / ordering tests, medicine, procedures  , and Communicating with other healthcare professionals .    **Please Note: This note may have been constructed using a voice recognition system**

## 2025-05-05 NOTE — PLAN OF CARE
Target UF Goal 4.5 L as tolerated. Patient dialyzing for 4 hours on 3 K bath for serum K of  3.9  per protocol. Treatment plan reviewed with Dr. Timmons.   Problem: METABOLIC, FLUID AND ELECTROLYTES - ADULT  Goal: Electrolytes maintained within normal limits  Description: INTERVENTIONS:- Monitor labs and assess patient for signs and symptoms of electrolyte imbalances- Administer electrolyte replacement as ordered- Monitor response to electrolyte replacements, including repeat lab results as appropriate- Instruct patient on fluid and nutrition as appropriate  Outcome: Progressing  Goal: Fluid balance maintained  Description: INTERVENTIONS:- Monitor labs - Monitor I/O and WT- Instruct patient on fluid and nutrition as appropriate- Assess for signs & symptoms of volume excess or deficit  Outcome: Progressing   Post-Dialysis RN Treatment Note    Blood Pressure:  Pre 133/69 mm/Hg  Post 105/66 mmHg   EDW:  167.5 kg    Weight:  Pre 172.6 kg   Post 167.7 kg   Mode of weight measurement: Standing Scale   Volume Removed:  4500 ml    Treatment duration: 240 minutes    NS given:  No    Treatment shortened No   Medications given during Rx: Heparin 3000 units   Estimated Kt/V:  None Reported   Access type: Permacath/TDC   Needle Gauge:  na   Access Issues: Yes, describe: sluggish blood return on red port. Needed to reverse line. Dr. Timmons was made aware     Report called to primary nurse:   Yes Grecia Lu RN

## 2025-05-05 NOTE — ASSESSMENT & PLAN NOTE
Lab Results   Component Value Date    HGBA1C 7.2 (H) 04/30/2025       Recent Labs     05/05/25  1041 05/05/25  1551 05/06/25  0743 05/06/25  1051   POCGLU 137 119 106 114     Not currently on medication.  Diabetic diet

## 2025-05-05 NOTE — PLAN OF CARE
Problem: PAIN - ADULT  Goal: Verbalizes/displays adequate comfort level or baseline comfort level  Description: Interventions:- Encourage patient to monitor pain and request assistance- Assess pain using appropriate pain scale- Administer analgesics based on type and severity of pain and evaluate response- Implement non-pharmacological measures as appropriate and evaluate response- Consider cultural and social influences on pain and pain management- Notify physician/advanced practitioner if interventions unsuccessful or patient reports new pain  Outcome: Progressing     Problem: INFECTION - ADULT  Goal: Absence or prevention of progression during hospitalization  Description: INTERVENTIONS:- Assess and monitor for signs and symptoms of infection- Monitor lab/diagnostic results- Monitor all insertion sites, i.e. indwelling lines, tubes, and drains- Monitor endotracheal if appropriate and nasal secretions for changes in amount and color- [place] appropriate cooling/warming therapies per order- Administer medications as ordered- Instruct and encourage patient and family to use good hand hygiene technique- Identify and instruct in appropriate isolation precautions for identified infection/condition  Outcome: Progressing     Problem: METABOLIC, FLUID AND ELECTROLYTES - ADULT  Goal: Electrolytes maintained within normal limits  Description: INTERVENTIONS:- Monitor labs and assess patient for signs and symptoms of electrolyte imbalances- Administer electrolyte replacement as ordered- Monitor response to electrolyte replacements, including repeat lab results as appropriate- Instruct patient on fluid and nutrition as appropriate  Outcome: Progressing

## 2025-05-05 NOTE — CONSULTS
e-Consult (IPC)  - Interventional Radiology  Joaquin Frankel 44 y.o. male MRN: [id number]  Unit/Bed#: [address] [place] [address] Encounter: [id number]          Interventional Radiology has been consulted to evaluate Joaquin Frankel    We were consulted by nephrology concerning this patient with poorly functioning hemodialysis catheter.    Inpatient Consult to IR  Consult performed by: Raoul Jeffries MD  Consult ordered by: Bandar Timmons MD        05/05/25    Assessment/Recommendation:   45 yo male with ESRD admitted 4/28 with right groin cellultis, indwelling right IJV tunneled HD catheter (15.5F x 32cm), originally placed 6/14/2024 (last changed 2/5/2025), now with difficulty aspirating from red port despite Activase. We are asked to evaluate and change/repostion as necessary.    11-20 minutes, >50% of the total time devoted to medical consultative verbal/EMR discussion between providers. Written report will be generated in the EMR.     Thank you for allowing Interventional Radiology to participate in the care of Joaquin Frankel. Please don't hesitate to call or TigerText us with any questions.     Raoul Jeffries MD

## 2025-05-05 NOTE — ASSESSMENT & PLAN NOTE
Lab Results   Component Value Date    EGFR 6 05/06/2025    EGFR 4 05/05/2025    EGFR 7 05/03/2025    CREATININE 9.13 (H) 05/06/2025    CREATININE 11.57 (H) 05/05/2025    CREATININE 8.05 (H) 05/03/2025     On HD on Monday Wednesday Thursday Friday  Nephrology following, input appreciated  Continue HD on discharge  Continue home meds  Status post new tunneled dialysis catheter placement today by IR due to ongoing issues with aspirating one of the ports  FR 1800.

## 2025-05-05 NOTE — ASSESSMENT & PLAN NOTE
Patient presented to ED with pain and swelling of right groin/scrotum. Reported pimple on right scrotum that ruptured with large amount of white drainage.   CT a/p showed extensive subcutaneous inflammatory stranding throughout the right groin, ventral lower pelvic wall, bilateral scrotal sacs suggestive of cellulitis/fasciitis or superficial infection; no findings of necrotizing fasciitis or Santhosh's gangrene; no abscess was noted  Lactate negative, procal 0.86 on admission  s/p I&D in OR 4/30 and found to have large right groin abscess  Wound Cultures with growth of group C beta-hemolytic strep and Prevotella Bivia  Off IV vancomycin and currently on Unasyn.  Will treat for 7 days post OR  Leukocytosis resolved with no further recurrence of fevers

## 2025-05-05 NOTE — ASSESSMENT & PLAN NOTE
Hgb of 6 on 5/2 is erroneous as repeat H/H was 8.3  Receives Venofer and Mircera outpatient  Hemoglobin today 9.4

## 2025-05-05 NOTE — ASSESSMENT & PLAN NOTE
Continue torsemide  Coreg was decreased to 3.125 mg twice a day and nifedipine held as blood pressures on the lower side at times  Continue torsemide, metolazone, Imdur

## 2025-05-06 ENCOUNTER — APPOINTMENT (INPATIENT)
Dept: NON INVASIVE DIAGNOSTICS | Facility: HOSPITAL | Age: 45
DRG: 602 | End: 2025-05-06
Attending: RADIOLOGY
Payer: COMMERCIAL

## 2025-05-06 VITALS
HEIGHT: 68 IN | WEIGHT: 315 LBS | DIASTOLIC BLOOD PRESSURE: 91 MMHG | BODY MASS INDEX: 47.74 KG/M2 | OXYGEN SATURATION: 96 % | TEMPERATURE: 97.4 F | HEART RATE: 59 BPM | RESPIRATION RATE: 17 BRPM | SYSTOLIC BLOOD PRESSURE: 111 MMHG

## 2025-05-06 PROBLEM — A41.9 SEPSIS (HCC): Status: RESOLVED | Noted: 2022-05-04 | Resolved: 2025-05-06

## 2025-05-06 LAB
ANION GAP SERPL CALCULATED.3IONS-SCNC: 20 MMOL/L (ref 4–13)
BUN SERPL-MCNC: 40 MG/DL (ref 5–25)
CALCIUM SERPL-MCNC: 9.6 MG/DL (ref 8.4–10.2)
CHLORIDE SERPL-SCNC: 87 MMOL/L (ref 96–108)
CO2 SERPL-SCNC: 23 MMOL/L (ref 21–32)
CREAT SERPL-MCNC: 9.13 MG/DL (ref 0.6–1.3)
GFR SERPL CREATININE-BSD FRML MDRD: 6 ML/MIN/1.73SQ M
GLUCOSE SERPL-MCNC: 102 MG/DL (ref 65–140)
GLUCOSE SERPL-MCNC: 106 MG/DL (ref 65–140)
GLUCOSE SERPL-MCNC: 114 MG/DL (ref 65–140)
HCT VFR BLD AUTO: 27.9 % (ref 36.5–49.3)
HGB BLD-MCNC: 9.4 G/DL (ref 12–17)
POTASSIUM SERPL-SCNC: 4.1 MMOL/L (ref 3.5–5.3)
SODIUM SERPL-SCNC: 130 MMOL/L (ref 135–147)

## 2025-05-06 PROCEDURE — 80048 BASIC METABOLIC PNL TOTAL CA: CPT | Performed by: FAMILY MEDICINE

## 2025-05-06 PROCEDURE — 36581 REPLACE TUNNELED CV CATH: CPT

## 2025-05-06 PROCEDURE — 36581 REPLACE TUNNELED CV CATH: CPT | Performed by: STUDENT IN AN ORGANIZED HEALTH CARE EDUCATION/TRAINING PROGRAM

## 2025-05-06 PROCEDURE — 85014 HEMATOCRIT: CPT | Performed by: FAMILY MEDICINE

## 2025-05-06 PROCEDURE — 99239 HOSP IP/OBS DSCHRG MGMT >30: CPT | Performed by: FAMILY MEDICINE

## 2025-05-06 PROCEDURE — C1750 CATH, HEMODIALYSIS,LONG-TERM: HCPCS

## 2025-05-06 PROCEDURE — 36595 MECH REMOV TUNNELED CV CATH: CPT

## 2025-05-06 PROCEDURE — 85018 HEMOGLOBIN: CPT | Performed by: FAMILY MEDICINE

## 2025-05-06 PROCEDURE — C1769 GUIDE WIRE: HCPCS

## 2025-05-06 PROCEDURE — C1725 CATH, TRANSLUMIN NON-LASER: HCPCS

## 2025-05-06 PROCEDURE — 99233 SBSQ HOSP IP/OBS HIGH 50: CPT | Performed by: INTERNAL MEDICINE

## 2025-05-06 PROCEDURE — 75901 REMOVE CVA DEVICE OBSTRUCT: CPT

## 2025-05-06 PROCEDURE — 02H633Z INSERTION OF INFUSION DEVICE INTO RIGHT ATRIUM, PERCUTANEOUS APPROACH: ICD-10-PCS | Performed by: STUDENT IN AN ORGANIZED HEALTH CARE EDUCATION/TRAINING PROGRAM

## 2025-05-06 PROCEDURE — 77001 FLUOROGUIDE FOR VEIN DEVICE: CPT

## 2025-05-06 PROCEDURE — 37799 UNLISTED PX VASCULAR SURGERY: CPT | Performed by: STUDENT IN AN ORGANIZED HEALTH CARE EDUCATION/TRAINING PROGRAM

## 2025-05-06 PROCEDURE — 77001 FLUOROGUIDE FOR VEIN DEVICE: CPT | Performed by: STUDENT IN AN ORGANIZED HEALTH CARE EDUCATION/TRAINING PROGRAM

## 2025-05-06 PROCEDURE — 82948 REAGENT STRIP/BLOOD GLUCOSE: CPT

## 2025-05-06 RX ORDER — CHLORHEXIDINE GLUCONATE ORAL RINSE 1.2 MG/ML
15 SOLUTION DENTAL ONCE
Status: DISCONTINUED | OUTPATIENT
Start: 2025-05-06 | End: 2025-05-06 | Stop reason: HOSPADM

## 2025-05-06 RX ORDER — POLYETHYLENE GLYCOL 3350 17 G/17G
17 POWDER, FOR SOLUTION ORAL DAILY
Qty: 30 EACH | Refills: 0 | Status: SHIPPED | OUTPATIENT
Start: 2025-05-06

## 2025-05-06 RX ORDER — DOCUSATE SODIUM 100 MG/1
100 CAPSULE, LIQUID FILLED ORAL 2 TIMES DAILY
Qty: 60 CAPSULE | Refills: 0 | Status: SHIPPED | OUTPATIENT
Start: 2025-05-06

## 2025-05-06 RX ORDER — CARVEDILOL 6.25 MG/1
3.12 TABLET ORAL 2 TIMES DAILY WITH MEALS
Start: 2025-05-06

## 2025-05-06 RX ORDER — OXYCODONE HYDROCHLORIDE 10 MG/1
10 TABLET ORAL EVERY 6 HOURS PRN
Qty: 15 TABLET | Refills: 0 | Status: SHIPPED | OUTPATIENT
Start: 2025-05-06

## 2025-05-06 RX ORDER — METOLAZONE 10 MG/1
10 TABLET ORAL 2 TIMES WEEKLY
Start: 2025-05-08

## 2025-05-06 RX ORDER — CALCITRIOL 0.25 UG/1
0.75 CAPSULE, LIQUID FILLED ORAL 3 TIMES WEEKLY
Start: 2025-05-07

## 2025-05-06 RX ORDER — CEFAZOLIN SODIUM 3 G/50ML
3000 SOLUTION INTRAVENOUS ONCE
Status: DISCONTINUED | OUTPATIENT
Start: 2025-05-06 | End: 2025-05-06 | Stop reason: HOSPADM

## 2025-05-06 RX ORDER — FENTANYL CITRATE 50 UG/ML
INJECTION, SOLUTION INTRAMUSCULAR; INTRAVENOUS AS NEEDED
Status: COMPLETED | OUTPATIENT
Start: 2025-05-06 | End: 2025-05-06

## 2025-05-06 RX ORDER — AMOXICILLIN AND CLAVULANATE POTASSIUM 250; 125 MG/1; MG/1
2 TABLET, FILM COATED ORAL ONCE
Qty: 2 TABLET | Refills: 0 | Status: SHIPPED | OUTPATIENT
Start: 2025-05-06 | End: 2025-05-06

## 2025-05-06 RX ADMIN — SEVELAMER HYDROCHLORIDE 800 MG: 800 TABLET ORAL at 16:17

## 2025-05-06 RX ADMIN — CALCIUM ACETATE 1334 MG: 667 CAPSULE ORAL at 16:17

## 2025-05-06 RX ADMIN — HYDROMORPHONE HYDROCHLORIDE 0.75 MG: 1 INJECTION, SOLUTION INTRAMUSCULAR; INTRAVENOUS; SUBCUTANEOUS at 16:14

## 2025-05-06 RX ADMIN — Medication 1 CAPSULE: at 16:17

## 2025-05-06 RX ADMIN — SEVELAMER HYDROCHLORIDE 800 MG: 800 TABLET ORAL at 08:46

## 2025-05-06 RX ADMIN — FENTANYL CITRATE 50 MCG: 50 INJECTION, SOLUTION INTRAMUSCULAR; INTRAVENOUS at 14:13

## 2025-05-06 RX ADMIN — NYSTATIN: 100000 POWDER TOPICAL at 08:58

## 2025-05-06 RX ADMIN — CARVEDILOL 3.12 MG: 3.12 TABLET, FILM COATED ORAL at 08:46

## 2025-05-06 RX ADMIN — OXYCODONE HYDROCHLORIDE 10 MG: 10 TABLET ORAL at 08:53

## 2025-05-06 RX ADMIN — OXYCODONE HYDROCHLORIDE 10 MG: 10 TABLET ORAL at 01:28

## 2025-05-06 RX ADMIN — OXYCODONE HYDROCHLORIDE 10 MG: 10 TABLET ORAL at 13:07

## 2025-05-06 RX ADMIN — DOCUSATE SODIUM 100 MG: 100 CAPSULE, LIQUID FILLED ORAL at 08:57

## 2025-05-06 RX ADMIN — POLYETHYLENE GLYCOL 3350 17 G: 17 POWDER, FOR SOLUTION ORAL at 08:57

## 2025-05-06 RX ADMIN — OXYCODONE HYDROCHLORIDE 10 MG: 10 TABLET ORAL at 05:49

## 2025-05-06 RX ADMIN — ATORVASTATIN CALCIUM 80 MG: 80 TABLET, FILM COATED ORAL at 08:57

## 2025-05-06 RX ADMIN — HYDROMORPHONE HYDROCHLORIDE 0.75 MG: 1 INJECTION, SOLUTION INTRAMUSCULAR; INTRAVENOUS; SUBCUTANEOUS at 06:53

## 2025-05-06 RX ADMIN — ASPIRIN 81 MG: 81 TABLET, CHEWABLE ORAL at 08:57

## 2025-05-06 RX ADMIN — HYDROMORPHONE HYDROCHLORIDE 0.75 MG: 1 INJECTION, SOLUTION INTRAMUSCULAR; INTRAVENOUS; SUBCUTANEOUS at 02:27

## 2025-05-06 RX ADMIN — AMPICILLIN SODIUM AND SULBACTAM SODIUM 3 G: 2; 1 INJECTION, POWDER, FOR SOLUTION INTRAMUSCULAR; INTRAVENOUS at 15:33

## 2025-05-06 RX ADMIN — OXYCODONE HYDROCHLORIDE 10 MG: 10 TABLET ORAL at 15:04

## 2025-05-06 RX ADMIN — CALCIUM ACETATE 1334 MG: 667 CAPSULE ORAL at 08:46

## 2025-05-06 RX ADMIN — ISOSORBIDE MONONITRATE 60 MG: 60 TABLET, EXTENDED RELEASE ORAL at 08:57

## 2025-05-06 RX ADMIN — CHOLECALCIFEROL TAB 25 MCG (1000 UNIT) 2000 UNITS: 25 TAB at 08:57

## 2025-05-06 RX ADMIN — INSULIN LISPRO 2 UNITS: 100 INJECTION, SOLUTION INTRAVENOUS; SUBCUTANEOUS at 17:18

## 2025-05-06 RX ADMIN — HYDROMORPHONE HYDROCHLORIDE 0.75 MG: 1 INJECTION, SOLUTION INTRAMUSCULAR; INTRAVENOUS; SUBCUTANEOUS at 09:21

## 2025-05-06 RX ADMIN — TORSEMIDE 100 MG: 100 TABLET ORAL at 08:57

## 2025-05-06 RX ADMIN — CARVEDILOL 3.12 MG: 3.12 TABLET, FILM COATED ORAL at 16:17

## 2025-05-06 RX ADMIN — NYSTATIN: 100000 POWDER TOPICAL at 17:15

## 2025-05-06 NOTE — DISCHARGE INSTRUCTIONS
Perma-cath Placement   WHAT YOU NEED TO KNOW:   A perma-cath is a catheter placed through a vein into or near your right atrium. Your right atrium is the right upper chamber of your heart. A perma-cath is used for dialysis in an emergency or until a long-term device is ready to use. After your procedure, you will have some pain and swelling on your chest and neck. You may have some bruises on your chest and neck. You may also have 2 dressings, one on your chest and one on your neck.   DISCHARGE INSTRUCTIONS:   Call 911 for any of the following:   You feel lightheaded, short of breath, and have chest pain.    Your catheter comes out   Contact Interventional Radiology at [phone] (ANDRES PATIENTS: Contact Interventional Radiology at [phone]) ([place] PATIENTS: Contact Interventional Radiology at [phone]) if:  Blood soaks through your bandage.   You have new swelling in your arm, neck, face, or chest on your right side.  Your catheter gets wet.    Your bruises or pain get worse.   You have a fever or chills.  Persistent nausea or vomiting.   Your incision is red, swollen, or draining pus.   You have questions or concerns about your condition or care.  Self-care:       Resume your normal diet.  Keep your dressings dry. Do not take a shower or swim. You may take a tub bath, but do not get your dressings wet. Water in your wound can cause bacteria to grow and cause an infection. If your dressing gets wet, dry it off and cover it with dry sterile gauze. Call your healthcare provider. Do not use soaps or ointments.  Do not change your dressings. Your healthcare provider or dialysis nurse will change your dressings. Your dressings should stay in place until your healthcare provider removes them. The dressing on your chest will stay as long as you have the catheter in place. The dressing prevents infection.    Do not remove the red and blue caps from the end of your catheter. The caps prevent air from getting  into your catheter.  Follow up with your healthcare provider as directed: Write down your questions so you remember to ask them during your visits.

## 2025-05-06 NOTE — ASSESSMENT & PLAN NOTE
MWF at [facility]   kg  Access: Right IJ permacath  Required Cathflo on 04/30  Ongoing issues with red port on 05/05 (unable to aspirate)  IR has been consulted for catheter check  Last HD: 05/05, postdialysis weight 167.7 kg  Next HD: 05/07

## 2025-05-06 NOTE — PROGRESS NOTES
NEPHROLOGY HOSPITAL PROGRESS NOTE   Joaquin Frankel 44 y.o. male MRN: [id number]  Unit/Bed#: 2 [facility] Encounter: [id number]  Reason for Consult: ESRD on HD    Assessment & Plan  ESRD (end stage renal disease) on dialysis (HCC)  MWF at [facility]   kg  Access: Right IJ permacath  Required Cathflo on 04/30  Ongoing issues with red port on 05/05 (unable to aspirate)  IR has been consulted for catheter check  Last HD: 05/05, postdialysis weight 167.7 kg  Next HD: 05/07  Primary hypertension  Blood pressure is currently on lower side  Continue carvedilol 3.125 mg twice daily and Imdur 60 mg daily with hold parameters  Continue torsemide 100 mg daily and metolazone 10 mg 2 times per week  Continue to hold nifedipine  Anemia of chronic disease  Hemoglobin today 9.4  Not on JOSSELYN due to history of PE  Hyperphosphatemia  Continue phosphorus binders  Continue low phosphorus diet  Continue calcitriol for secondary hyperparathyroidism  Hyponatremia  Add fluid restriction 1200 cc per 24 hours  Continue attempts at ultrafiltration on dialysis  Abscess of right groin  Status post I&D  Management per primary team and surgery team  hx of Pulmonary embolism (HCC)  Noted, continue anticoagulation per primary team  Type 2 diabetes mellitus (HCC)  Management per primary team      I have reviewed the nephrology recommendations including the patient is stable for interdialytic timeframe and next hemodialysis session will be tomorrow, with internal medicine team, and we are in agreement with renal plan including the information outlined above.    SUBJECTIVE / 24H INTERVAL HISTORY:  Status post HD yesterday with 4.5 L UF.  Denies dyspnea.  Has chronic leg swelling on left side due to lymphedema.    OBJECTIVE:  Current Weight: Weight - Scale: (!) 170 kg (375 lb)  Vitals:    05/05/25 1552 05/05/25 1919 05/05/25 2255 05/05/25 2300   BP: 103/65 98/53 (!) 98/49    BP Location:       Pulse:       Resp: 18 19     Temp: 98.4 °F (36.9  °C) 97.9 °F (36.6 °C) 97.6 °F (36.4 °C)    TempSrc:       SpO2:    92%   Weight:       Height:           Intake/Output Summary (Last 24 hours) at 5/6/2025 0815  Last data filed at 5/5/2025 1801  Gross per 24 hour   Intake 860 ml   Output 5001 ml   Net -4141 ml     Review of Systems   Constitutional:  Negative for chills and fever.   HENT:  Negative for ear pain and sore throat.    Eyes:  Negative for pain and visual disturbance.   Respiratory:  Negative for cough and shortness of breath.    Cardiovascular:  Positive for leg swelling. Negative for chest pain and palpitations.   Gastrointestinal:  Negative for abdominal pain and vomiting.   Genitourinary:  Negative for dysuria and hematuria.   Musculoskeletal:  Negative for arthralgias and back pain.   Skin:  Negative for color change and rash.   Neurological:  Negative for seizures and syncope.   All other systems reviewed and are negative.    Physical Exam  Vitals and nursing note reviewed.   Constitutional:       General: He is not in acute distress.     Appearance: He is well-developed.   HENT:      Head: Normocephalic and atraumatic.   Eyes:      Conjunctiva/sclera: Conjunctivae normal.   Cardiovascular:      Rate and Rhythm: Normal rate and regular rhythm.      Pulses: Normal pulses.      Heart sounds: Normal heart sounds. No murmur heard.     Comments: Right IJ permacath present  Pulmonary:      Effort: Pulmonary effort is normal. No respiratory distress.      Breath sounds: Normal breath sounds.   Abdominal:      Palpations: Abdomen is soft.      Tenderness: There is no abdominal tenderness.   Musculoskeletal:         General: No swelling.      Cervical back: Neck supple.      Right lower leg: No edema.      Left lower leg: Edema present.   Skin:     General: Skin is warm and dry.      Capillary Refill: Capillary refill takes less than 2 seconds.   Neurological:      Mental Status: He is alert.   Psychiatric:         Mood and Affect: Mood normal.        Medications:    Current Facility-Administered Medications:     acetaminophen (TYLENOL) tablet 650 mg, 650 mg, Oral, Q6H PRN, Tim Humphrey PA-C, 650 mg at 05/01/25 1650    ampicillin-sulbactam (UNASYN) 3 g in sodium chloride 0.9 % 100 mL IVPB, 3 g, Intravenous, Q24H, Wanda Navarro, , Last Rate: 200 mL/hr at 05/05/25 1731, 3 g at 05/05/25 1731    aspirin chewable tablet 81 mg, 81 mg, Oral, Daily, Tim Humphrey PA-C, 81 mg at 05/05/25 1743    atorvastatin (LIPITOR) tablet 80 mg, 80 mg, Oral, Daily, Tim Humphrey PA-C, 80 mg at 05/05/25 1743    calcitriol (ROCALTROL) capsule 0.75 mcg, 0.75 mcg, Oral, Once per day on Monday Wednesday Friday, Tim Humphrey PA-C, 0.75 mcg at 05/05/25 1743    calcium acetate (PHOSLO) capsule 1,334 mg, 1,334 mg, Oral, TID, Tim Humphrey PA-C, 1,334 mg at 05/05/25 1739    calcium carbonate (TUMS) chewable tablet 500 mg, 500 mg, Oral, TID PRN, Joanne Anguiano MD, 500 mg at 05/04/25 2100    carvedilol (COREG) tablet 3.125 mg, 3.125 mg, Oral, BID With Meals, Johnny Viramontes MD, 3.125 mg at 05/05/25 0728    Cholecalciferol (VITAMIN D3) tablet 2,000 Units, 2,000 Units, Oral, Daily, Tim Humphrey PA-C, 2,000 Units at 05/04/25 0822    docusate sodium (COLACE) capsule 100 mg, 100 mg, Oral, BID, Tim Humphrey PA-C, 100 mg at 05/05/25 1739    heparin (porcine) subcutaneous injection 7,500 Units, 7,500 Units, Subcutaneous, Q8H SHAZIA, Tim Humphrey PA-C, 7,500 Units at 05/05/25 0550    HYDROmorphone (DILAUDID) injection 0.5 mg, 0.5 mg, Intravenous, Once, Wanda Navarro DO    HYDROmorphone (DILAUDID) injection 0.75 mg, 0.75 mg, Intravenous, Q3H PRN, Wanda Navarro DO, 0.75 mg at 05/06/25 0653    insulin lispro (HumALOG/ADMELOG) 100 units/mL subcutaneous injection 2-12 Units, 2-12 Units, Subcutaneous, TID AC, 2 Units at 05/03/25 1118 **AND** Fingerstick Glucose (POCT), , , TID AC, Tim Humphrey PA-C    isosorbide mononitrate (IMDUR) 24 hr tablet 60 mg, 60 mg, Oral, Daily, Tim Humphrey PA-C, 60 mg  at 05/04/25 0823    metolazone (ZAROXOLYN) tablet 10 mg, 10 mg, Oral, Once per day on Monday Thursday, PETRA Ortiz    naloxone (NARCAN) 0.04 mg/mL syringe 0.04 mg, 0.04 mg, Intravenous, Q1MIN PRN, Tim Humphrey PA-C    [Held by provider] NIFEdipine (PROCARDIA XL) 24 hr tablet 30 mg, 30 mg, Oral, HS, Tim Humphrey PA-C, 30 mg at 05/01/25 2107    nystatin (MYCOSTATIN) powder, , Topical, BID, Wanda Revankar, DO, Given at 05/03/25 0906    ondansetron (ZOFRAN) injection 4 mg, 4 mg, Intravenous, Q6H PRN, Tim Humphrey PA-C, 4 mg at 05/02/25 1358    oxyCODONE (ROXICODONE) IR tablet 5 mg, 5 mg, Oral, Q4H PRN, 5 mg at 05/02/25 0813 **OR** oxyCODONE (ROXICODONE) immediate release tablet 10 mg, 10 mg, Oral, Q4H PRN, Tim Humphrey PA-C, 10 mg at 05/06/25 0549    polyethylene glycol (MIRALAX) packet 17 g, 17 g, Oral, BID, [facility] Revankar, DO, 17 g at 05/05/25 1737    sevelamer (RENAGEL) tablet 800 mg, 800 mg, Oral, TID With Meals, Tim Humphrey PA-C, 800 mg at 05/03/25 1618    torsemide (DEMADEX) tablet 100 mg, 100 mg, Oral, Daily, Tim Humphrey PA-C, 100 mg at 05/04/25 0822    vit B complex-vit C-folic acid (Renal Caps) capsule 1 capsule, 1 mg, Oral, Daily With Dinner, Tim Humphrey PA-C, 1 capsule at 05/05/25 1739    Laboratory Results:  Results from last 7 days   Lab Units 05/06/25  0549 05/05/25  0557 05/03/25  0601 05/02/25  0854 05/02/25  0544 05/01/25  0430 04/30/25  0617   WBC Thousand/uL  --  8.62 7.53  --  8.99 7.68 12.69*   HEMOGLOBIN g/dL 9.4* 7.9* 8.5* 8.3* 6.0* 8.1* 9.1*   HEMATOCRIT % 27.9* 23.5* 25.8* 24.8* 18.4* 25.3* 27.8*   PLATELETS Thousands/uL  --  308 288  --  231 294 361   POTASSIUM mmol/L 4.1 3.9 3.9  --  4.0 4.8 5.2   CHLORIDE mmol/L 87* 87* 87*  --  89* 93* 94*   CO2 mmol/L 23 23 28  --  25 20* 20*   BUN mg/dL 40* 55* 36*  --  49* 74* 71*   CREATININE mg/dL 9.13* 11.57* 8.05*  --  9.18* 11.35* 10.38*   CALCIUM mg/dL 9.6 9.1 9.3  --  8.9 8.8 9.4   PHOSPHORUS mg/dL  --   --   --   --  7.0*   "--   --        Portions of the record may have been created with voice recognition software. Occasional wrong word or \"sound a like\" substitutions may have occurred due to the inherent limitations of voice recognition software. Read the chart carefully and recognize, using context, where substitutions have occurred.If you have any questions, please contact the dictating provider.    "

## 2025-05-06 NOTE — ASSESSMENT & PLAN NOTE
Blood pressure is currently on lower side  Continue carvedilol 3.125 mg twice daily and Imdur 60 mg daily with hold parameters  Continue torsemide 100 mg daily and metolazone 10 mg 2 times per week  Continue to hold nifedipine

## 2025-05-06 NOTE — PLAN OF CARE
Problem: PAIN - ADULT  Goal: Verbalizes/displays adequate comfort level or baseline comfort level  Description: Interventions:- Encourage patient to monitor pain and request assistance- Assess pain using appropriate pain scale- Administer analgesics based on type and severity of pain and evaluate response- Implement non-pharmacological measures as appropriate and evaluate response- Consider cultural and social influences on pain and pain management- Notify physician/advanced practitioner if interventions unsuccessful or patient reports new pain  Outcome: Progressing     Problem: INFECTION - ADULT  Goal: Absence or prevention of progression during hospitalization  Description: INTERVENTIONS:- Assess and monitor for signs and symptoms of infection- Monitor lab/diagnostic results- Monitor all insertion sites, i.e. indwelling lines, tubes, and drains- Monitor endotracheal if appropriate and nasal secretions for changes in amount and color- [place] appropriate cooling/warming therapies per order- Administer medications as ordered- Instruct and encourage patient and family to use good hand hygiene technique- Identify and instruct in appropriate isolation precautions for identified infection/condition  Outcome: Progressing  Goal: Absence of fever/infection during neutropenic period  Description: INTERVENTIONS:- Monitor WBC  Outcome: Progressing

## 2025-05-06 NOTE — DISCHARGE INSTR - AVS FIRST PAGE
You will need 1 more day of antibiotic.  Please take the Augmentin tomorrow AFTER your dialysis session

## 2025-05-06 NOTE — PLAN OF CARE
Problem: PAIN - ADULT  Goal: Verbalizes/displays adequate comfort level or baseline comfort level  Description: Interventions:- Encourage patient to monitor pain and request assistance- Assess pain using appropriate pain scale- Administer analgesics based on type and severity of pain and evaluate response- Implement non-pharmacological measures as appropriate and evaluate response- Consider cultural and social influences on pain and pain management- Notify physician/advanced practitioner if interventions unsuccessful or patient reports new pain  Outcome: Progressing     Problem: INFECTION - ADULT  Goal: Absence or prevention of progression during hospitalization  Description: INTERVENTIONS:- Assess and monitor for signs and symptoms of infection- Monitor lab/diagnostic results- Monitor all insertion sites, i.e. indwelling lines, tubes, and drains- Monitor endotracheal if appropriate and nasal secretions for changes in amount and color- [place] appropriate cooling/warming therapies per order- Administer medications as ordered- Instruct and encourage patient and family to use good hand hygiene technique- Identify and instruct in appropriate isolation precautions for identified infection/condition  Outcome: Progressing  Goal: Absence of fever/infection during neutropenic period  Description: INTERVENTIONS:- Monitor WBC  Outcome: Progressing     Problem: SAFETY ADULT  Goal: Patient will remain free of falls  Description: INTERVENTIONS:- Educate patient/family on patient safety including physical limitations- Instruct patient to call for assistance with activity - Consult OT/PT to assist with strengthening/mobility - Keep Call bell within reach- Keep bed low and locked with side rails adjusted as appropriate- Keep care items and personal belongings within reach- Initiate and maintain comfort rounds- Make Fall Risk Sign visible to staff- Outcome: Progressing  Goal: Maintain or return to baseline ADL  function  Description: INTERVENTIONS:-  Assess patient's ability to carry out ADLs; assess patient's baseline for ADL function and identify physical deficits which impact ability to perform ADLs (bathing, care of mouth/teeth, toileting, grooming, dressing, etc.)- Assess/evaluate cause of self-care deficits - Assess range of motion- Assess patient's mobility; develop plan if impaired- Assess patient's need for assistive devices and provide as appropriate- Encourage maximum independence but intervene and supervise when necessary- Involve family in performance of ADLs- Assess for home care needs following discharge - Consider OT consult to assist with ADL evaluation and planning for discharge- Provide patient education as appropriate  Outcome: Progressing  Goal: Maintains/Returns to pre admission functional level  Description: INTERVENTIONS:- Perform AM-PAC 6 Click Basic Mobility/ Daily Activity assessment daily.- Set and communicate daily mobility goal to care team and patient/family/caregiver. - Outcome: Progressing

## 2025-05-06 NOTE — SEDATION DOCUMENTATION
New right permacath placed, pt back to room in stable condition, still with groin pain. Report given to bedside RN.

## 2025-05-06 NOTE — CASE MANAGEMENT
Case Management Assessment & Discharge Planning Note    Patient name Joaquin Frankel  Location 2 [facility] [address] [place] [address] MRN [id number]  : 1980 Date 2025       Current Admission Date: 2025  Current Admission Diagnosis:Abscess of right groin   Patient Active Problem List    Diagnosis Date Noted Date Diagnosed    Hyperphosphatemia 2025     Abscess of right groin 2025     Cellulitis of left lower extremity 2024     History of PTCA with stent 2024     Dialysis patient ([facility]) 2024     MGUS (monoclonal gammopathy of unknown significance) 2024     hx of Pulmonary embolism ([facility]) 2024     ESRD (end stage renal disease) on dialysis ([facility]) 2024     Type 2 diabetes mellitus ([facility]) 2024     Volume overload 2024     Surgical follow-up care 2024     Urinary problem in male 2024     Epidermoid cyst of skin of scalp 2024     Stopped smoking with greater than 20 pack year history 2024     ANGY (obstructive sleep apnea) 2024     Long term (current) use of immunomodulator 2023     Chronic acquired lymphedema 06/15/2022     History of coronary angioplasty with insertion of stent 2022     Sepsis ([facility]) 2022     Anemia of chronic disease 2022     History of hidradenitis suppurativa 2022     Nocturnal hypoxia 2021     Morbid obesity ([facility]) 03/10/2021     Hyponatremia 2021     MI (myocardial infarction) ([facility]) 2020     Other hyperlipidemia 2017     Primary hypertension 2016     Coronary artery disease with angina pectoris ([facility]) 2014       LOS (days): 6  Geometric Mean LOS (GMLOS) (days): 4.6  Days to GMLOS:-1.5     OBJECTIVE:    Risk of Unplanned Readmission Score: 32.27     Current admission status: Inpatient    Preferred Pharmacy:   [id number] - [place], [place] - [address] [address]  Phone: [phone] Fax:  [phone]    [facility] - [place], [place] - [address], [address]  [address]  [facility] [address]  Phone: [phone] Fax: [phone]    [facility] - [place], [place] - [address]  [address]  [place] [address]  Phone: [phone] Fax: [phone]    Primary Care Provider: Hany Mcfarland DO    Primary Insurance: BLUE CROSS  Secondary Insurance: MEDICARE    ASSESSMENT:  Active Health Care Proxies    There are no active Health Care Proxies on file.         Patient Information  Admitted from:: Home  Mental Status: Alert  During Assessment patient was accompanied by: Not accompanied during assessment  Assessment information provided by:: Patient  Primary Caregiver: Self  Support Systems: Son, Family members  County of Residence: [place]  What [facility] do you live in?: [place]  Living Arrangements: Lives w/ Son    Activities of Daily Living Prior to Admission  Functional Status: Independent  Completes ADLs independently?: Yes  Ambulates independently?: Yes  Does patient currently have HHC?: No    Patient Information Continued  Income Source: Unemployed  Does patient have prescription coverage?: Yes  Can the patient afford their medications and any related supplies (such as glucometers or test strips)?: Yes  Does patient receive dialysis treatments?: Yes (MWF at [facility], Thursday at [facility] - pt drives himself)  Does patient have a history of substance abuse?: No  Does patient have a history of Mental Health Diagnosis?: No    Means of Transportation  Means of Transport to Appts:: Drives Self      DISCHARGE DETAILS:    Discharge planning discussed with:: Patient  Freedom of Choice: Yes  Comments - Freedom of Choice: SW met briefly with pt to discuss plans.  Pt's plan is to return home with his son and resume outpatient dialysis when discharged.  Pt is anticipating discharge later today.  Pt's car is in parking lot so  he is hoping he can drive himself home.  Pt also drives himself to dialysis.  No discharge needs expressed by pt.    Requested Home Health Care         Is the patient interested in HHC at discharge?: No    DME Referral Provided  Referral made for DME?: No    Other Referral/Resources/Interventions Provided:  Interventions: None Indicated    Treatment Team Recommendation: Home  Discharge Destination Plan:: Home  Transport at Discharge : Self

## 2025-05-06 NOTE — BRIEF OP NOTE (RAD/CATH)
INTERVENTIONAL RADIOLOGY PROCEDURE NOTE    Date: 5/6/2025    Procedure:   Procedure Summary       Date:  Room / Location:     Anesthesia Start:  Anesthesia Stop:     Procedure:  Diagnosis:     Scheduled Providers:  Responsible Provider:     Anesthesia Type: Not recorded ASA Status: Not recorded            Preoperative diagnosis:   1. Cellulitis of groin, right    2. Abscess    3. Cellulitis of groin    4. ESRD (end stage renal disease) (HCC)    5. Dialysis patient (HCC)         Postoperative diagnosis: Same.    Surgeon: Radames Liu MD     Assistant: None. No qualified resident was available.    Blood loss: Minimal    Specimens: None     Findings:   Inability to aspirate from red port of existing TDC.  Sluggish flow via blue port.    Catheter was removed over a guidewire.  Note was made of organizing thrombus with developing fibrin sheath associated with the catheter and filling the red lumen.    Empiric fibrin sheath stripping performed with a 6mm angioplasty balloon.      A new TDC was then placed with tip terminating in the right atrium.    New catheter aspirated and flushed briskly.  New catheter ready for immediate use.    Complications: None immediate.    Anesthesia: local and IV Fentanyl

## 2025-05-06 NOTE — QUICK NOTE
Packing changed at bedside. Minimal drainage continued improvement of induration. Follow up in wound center.

## 2025-05-07 ENCOUNTER — TELEPHONE (OUTPATIENT)
Dept: FAMILY MEDICINE CLINIC | Facility: CLINIC | Age: 45
End: 2025-05-07

## 2025-05-07 ENCOUNTER — TRANSITIONAL CARE MANAGEMENT (OUTPATIENT)
Dept: FAMILY MEDICINE CLINIC | Facility: CLINIC | Age: 45
End: 2025-05-07

## 2025-05-07 NOTE — UTILIZATION REVIEW
NOTIFICATION OF ADMISSION DISCHARGE   This is a Notification of Discharge from [facility]. Please be advised that this patient has been discharge from our facility. Below you will find the admission and discharge date and time including the patient’s disposition.   UTILIZATION REVIEW CONTACT:  Utilization Review Assistants  Network Utilization Review Department  Phone: [phone] x carefully listen to the prompts. All voicemails are confidential.  Email: [email or web address]     ADMISSION INFORMATION  PRESENTATION DATE: 4/28/2025  7:46 PM  OBERVATION ADMISSION DATE: 04/28/2025 2323  INPATIENT ADMISSION DATE: 4/30/25 11:52 AM   DISCHARGE DATE: 5/6/2025  7:20 PM   DISPOSITION:Home/Self Care    Network Utilization Review Department  ATTENTION: Please call with any questions or concerns to [phone] and carefully listen to the prompts so that you are directed to the right person. All voicemails are confidential.   For Discharge needs, contact Care Management DC Support Team at [phone] opt. 2  Send all requests for admission clinical reviews, approved or denied determinations and any other requests to dedicated fax number below belonging to the campus where the patient is receiving treatment. List of dedicated fax numbers for the Facilities:  FACILITY NAME UR FAX NUMBER   ADMISSION DENIALS (Administrative/Medical Necessity) [phone]   DISCHARGE SUPPORT TEAM ([facility]) [phone]   PARENT CHILD HEALTH (Maternity/NICU/Pediatrics) [phone]   [facility] [phone]   [facility] [phone]   [facility] [phone]   [facility] [phone]   [facility] [phone]   [facility] [phone]   [facility] [phone]   [facility] [phone]    [facility] [phone]   [facility] [phone]   [facility] [phone]   [facility] [phone]

## 2025-05-10 ENCOUNTER — TELEPHONE (OUTPATIENT)
Dept: FAMILY MEDICINE CLINIC | Facility: CLINIC | Age: 45
End: 2025-05-10

## 2025-05-11 NOTE — TELEPHONE ENCOUNTER
----- Message from Wanda Navarro DO sent at 5/6/2025  5:02 PM EDT -----  Regarding: RE: Discharge  Thank you for allowing us to participate in the care of your patient, Joaquin Frankel, who was hospitalized from 4/28/2025 through 5/6/2025 with the admitting diagnosis of right groin abscess status post I&D in the OR by surgery.  Treated with Unasyn.  Due to TDC problems had new tunneled dialysis catheter placed by IR today prior to discharge.      Medication Changes:  Complete course of antibiotic with Augmentin for 1 day  Coreg dose decreased and nifedipine discontinued    Outpatient testing recommended:  None    If you have any additional questions or would like to discuss further, please feel free to contact me.    Wanda Navarro DO  [facility], Hospitalist  [phone]

## 2025-05-16 ENCOUNTER — PREP FOR PROCEDURE (OUTPATIENT)
Dept: VASCULAR SURGERY | Facility: CLINIC | Age: 45
End: 2025-05-16

## 2025-05-17 RX ORDER — SEMAGLUTIDE 0.25 MG/.5ML
0.25 INJECTION, SOLUTION SUBCUTANEOUS WEEKLY
COMMUNITY
Start: 2025-05-14

## 2025-05-19 ENCOUNTER — OFFICE VISIT (OUTPATIENT)
Dept: FAMILY MEDICINE CLINIC | Facility: CLINIC | Age: 45
End: 2025-05-19
Payer: COMMERCIAL

## 2025-05-19 VITALS
WEIGHT: 315 LBS | HEIGHT: 68 IN | SYSTOLIC BLOOD PRESSURE: 108 MMHG | OXYGEN SATURATION: 97 % | RESPIRATION RATE: 20 BRPM | DIASTOLIC BLOOD PRESSURE: 60 MMHG | BODY MASS INDEX: 47.74 KG/M2 | TEMPERATURE: 98 F | HEART RATE: 82 BPM

## 2025-05-19 DIAGNOSIS — Z99.2 ESRD (END STAGE RENAL DISEASE) ON DIALYSIS (HCC): ICD-10-CM

## 2025-05-19 DIAGNOSIS — L02.214 ABSCESS OF RIGHT GROIN: Primary | ICD-10-CM

## 2025-05-19 DIAGNOSIS — N18.6 ESRD (END STAGE RENAL DISEASE) ON DIALYSIS (HCC): ICD-10-CM

## 2025-05-19 DIAGNOSIS — I10 PRIMARY HYPERTENSION: ICD-10-CM

## 2025-05-19 DIAGNOSIS — E66.01 MORBID OBESITY (HCC): ICD-10-CM

## 2025-05-19 DIAGNOSIS — R73.09 ABNORMAL GLUCOSE: ICD-10-CM

## 2025-05-19 PROBLEM — L03.116 CELLULITIS OF LEFT LOWER EXTREMITY: Status: RESOLVED | Noted: 2024-11-25 | Resolved: 2025-05-19

## 2025-05-19 PROBLEM — E11.9 TYPE 2 DIABETES MELLITUS (HCC): Status: RESOLVED | Noted: 2024-07-05 | Resolved: 2025-05-19

## 2025-05-19 PROCEDURE — 99495 TRANSJ CARE MGMT MOD F2F 14D: CPT | Performed by: FAMILY MEDICINE

## 2025-05-19 NOTE — PROGRESS NOTES
Name: Joaquin Frankel      : 1980      MRN: [id number]  Encounter Provider: Hany Mcfarland DO  Encounter Date: 2025   Encounter department: [facility]  :  Assessment & Plan  Abnormal glucose  F/u for DM2, possible zebpound etc.  Orders:    Comprehensive metabolic panel; Future    Hemoglobin A1C; Future    Abscess of right groin  Some slight pus  F/u wound care  Try some hibiclens wash       Primary hypertension  Stable on reduced regimen       Morbid obesity (HCC)    Zepbound vs mounjaro       ESRD (end stage renal disease) on dialysis (HCC)  Lab Results   Component Value Date    EGFR 6 2025    EGFR 4 2025    EGFR 7 2025    CREATININE 9.13 (H) 2025    CREATININE 11.57 (H) 2025    CREATININE 8.05 (H) 2025     wnl              History of Present Illness   HPI  Had I and D by Dr Mooney  Nifedipine stopped  Carvedilol was reduced to 3.125mg bid  Has not started wegovy yet  Abscess culture group C strep  Not been to wound care yet    TCM Call (since 2025)       Date and time call was made  2025  8:16 AM    Hospital care reviewed  Records reviewed    Patient was hospitialized at  [facility]    Date of Admission  25    Date of discharge  25    Diagnosis  Abscess of right groin    Disposition  Home    Were the patients medications reviewed and updated  Yes    Current Symptoms  None  felling tired          TCM Call (since 2025)       Post hospital issues  None    Scheduled for follow up?  Yes    Did you obtain your prescribed medications  Yes    Do you need help managing your prescriptions or medications  No    Is transportation to your appointment needed  No    I have advised the patient to call PCP with any new or worsening symptoms  patient is aware to go to the hospital for any new symptoms and also albino  give us a call back if anything    Living Arrangements  Alone    Support System  Family; Friends    The type of support  "provided  Emotional    Do you have social support  Yes, as much as I need    Are you recieving home care services  No              Family History   Problem Relation Age of Onset    Heart disease Father     Diabetes Father     Hypertension Mother     Heart disease Mother       Social History[1]   Current Outpatient Medications   Medication Instructions    amoxicillin-clavulanate (AUGMENTIN) 250-125 mg per tablet     aspirin (ECOTRIN LOW STRENGTH) 81 mg, Oral, Daily    atorvastatin (LIPITOR) 80 mg, Oral, Daily    b complex-vitamin C-folic acid (RENAL) 1 mg 1 capsule, Oral, Daily with dinner    calcitriol (ROCALTROL) 0.75 mcg, Oral, 3 times weekly, Mon, Wed, Fri    calcium acetate (PHOSLO) 1,334 mg, Oral, 3 times daily    carvedilol (COREG) 3.125 mg, Oral, 2 times daily with meals    Cholecalciferol (VITAMIN D3) 2,000 Units, Oral, Daily    docusate sodium (COLACE) 100 mg, Oral, 2 times daily    doxycycline hyclate (VIBRAMYCIN) 100 mg capsule     isosorbide mononitrate (IMDUR) 60 mg, Oral, Daily    metolazone (ZAROXOLYN) 10 mg, Oral, 2 times weekly, On Monday, Thursday    naloxone (NARCAN) 4 mg/0.1 mL nasal spray Administer 1 spray into a nostril. If no response after 2-3 minutes, give another dose in the other nostril using a new spray.    oxyCODONE (ROXICODONE) 10 mg, Oral, Every 6 hours PRN    polyethylene glycol (MIRALAX) 17 g, Oral, Daily    sevelamer (RENAGEL) 800 mg, Oral, 3 times daily with meals    torsemide (DEMADEX) 100 mg tablet TAKE ONE TABLET BY MOUTH EVERY DAY (GENERIC FOR DEMADEX)    Wegovy 0.25 mg, Weekly     >>>>>>>>  Return if symptoms worsen or fail to improve.  >>>>>>>>    [POCT]  No results found for this or any previous visit (from the past 24 hours).    Visit Vitals  /60   Pulse 82   Temp 98 °F (36.7 °C)   Resp 20   Ht 5' 8\" (1.727 m)   Wt (!) 166 kg (367 lb)   SpO2 97%   BMI 55.80 kg/m²   Smoking Status Former   BSA 2.64 m²        Review of Systems   Constitutional:  Negative for chills " "and fever.   Gastrointestinal:  Negative for diarrhea.       Objective   /60   Pulse 82   Temp 98 °F (36.7 °C)   Resp 20   Ht 5' 8\" (1.727 m)   Wt (!) 166 kg (367 lb)   SpO2 97%   BMI 55.80 kg/m²      Physical Exam  Vitals and nursing note reviewed.   Constitutional:       Appearance: He is well-developed. He is obese. He is not ill-appearing.   HENT:      Head: Normocephalic.      Right Ear: Tympanic membrane normal.      Left Ear: Tympanic membrane normal.      Mouth/Throat:      Mouth: Mucous membranes are moist.     Eyes:      General: No scleral icterus.     Conjunctiva/sclera: Conjunctivae normal.      Pupils: Pupils are equal, round, and reactive to light.       Cardiovascular:      Rate and Rhythm: Normal rate and regular rhythm.      Heart sounds: Murmur heard.   Pulmonary:      Effort: Pulmonary effort is normal.      Breath sounds: No wheezing.   Abdominal:      Palpations: Abdomen is soft.     Musculoskeletal:         General: No deformity.      Cervical back: Neck supple.     Skin:     General: Skin is warm and dry.      Coloration: Skin is not jaundiced or pale.      Comments: Right groin wound, some pus, no induration     Neurological:      Mental Status: He is alert.      Motor: No weakness.      Gait: Gait normal.     Psychiatric:         Mood and Affect: Mood normal.         Behavior: Behavior normal.         Thought Content: Thought content normal.                [1]   Social History  Tobacco Use    Smoking status: Former     Current packs/day: 0.00     Average packs/day: 1.5 packs/day for 20.0 years (29.9 ttl pk-yrs)     Types: Cigarettes     Start date: 2021     Quit date: 2021     Years since quittin.2     Passive exposure: Past    Smokeless tobacco: Never   Vaping Use    Vaping status: Never Used   Substance Use Topics    Alcohol use: Not Currently    Drug use: Never     "

## 2025-05-19 NOTE — PATIENT INSTRUCTIONS
Since your A1c was 7.2 in April 2025, you could be defined as a uncontrolled type 2 Diabetic and if wegovy is not covered for weight loss, then ozempic may be covered for diabetes.    Please follow up at the wound care center.    You can wash the groin area daily with hibiclens for the next 7-10 days then a few times a week for prevention of recurrent infection.

## 2025-05-20 NOTE — ASSESSMENT & PLAN NOTE
F/u for DM2, possible zebpound etc.  Orders:    Comprehensive metabolic panel; Future    Hemoglobin A1C; Future

## 2025-05-20 NOTE — ASSESSMENT & PLAN NOTE
Lab Results   Component Value Date    EGFR 6 05/06/2025    EGFR 4 05/05/2025    EGFR 7 05/03/2025    CREATININE 9.13 (H) 05/06/2025    CREATININE 11.57 (H) 05/05/2025    CREATININE 8.05 (H) 05/03/2025     wnl

## 2025-05-27 ENCOUNTER — ANESTHESIA EVENT (OUTPATIENT)
Dept: PERIOP | Facility: HOSPITAL | Age: 45
End: 2025-05-27
Payer: COMMERCIAL

## 2025-05-27 NOTE — PRE-PROCEDURE INSTRUCTIONS
Pre-Surgery Instructions:   Medication Instructions    aspirin (ECOTRIN LOW STRENGTH) 81 mg EC tablet Take day of surgery.    atorvastatin (LIPITOR) 80 mg tablet Take night before surgery    calcitriol (ROCALTROL) 0.25 mcg capsule Take day of surgery.    calcium acetate (PHOSLO) capsule Take day of surgery.    carvedilol (COREG) 6.25 mg tablet Take day of surgery.    isosorbide mononitrate (IMDUR) 60 mg 24 hr tablet Take day of surgery.    metolazone (ZAROXOLYN) 10 mg tablet Hold day of surgery.    sevelamer (RENAGEL) 800 mg tablet Take day of surgery.    torsemide (DEMADEX) 100 mg tablet Hold day of surgery.    Patient takes all medications in afternoon. Medication instructions for day of surgery reviewed. Please take all instructed medications with only a sip of water.       You will receive a call one business day prior to surgery with an arrival time and hospital directions. If your surgery is scheduled on a Monday, the hospital will be calling you on the Friday prior to your surgery. If you have not heard from anyone by 8pm, please call the hospital supervisor through the hospital  at [phone]. ([facility] [phone] or [place] [phone]).    Do not eat or drink anything after midnight the night before your surgery, including candy, mints, lifesavers, or chewing gum. Do not drink alcohol 24hrs before your surgery. Try not to smoke at least 24hrs before your surgery.       Follow the pre surgery showering instructions as listed in the “My Surgical Experience Booklet” or otherwise provided by your surgeon's office. Do not use a blade to shave the surgical area 1 week before surgery. It is okay to use a clean electric clippers up to 24 hours before surgery. Do not apply any lotions, creams, including makeup, cologne, deodorant, or perfumes after showering on the day of your surgery. Do not use dry shampoo, hair spray, hair gel, or any type of hair products.     No contact lenses, eye make-up, or  artificial eyelashes. Remove nail polish, including gel polish, and any artificial, gel, or acrylic nails if possible. Remove all jewelry including rings and body piercing jewelry.     Wear causal clothing that is easy to take on and off. Consider your type of surgery.    Keep any valuables, jewelry, piercings at home. Please bring any specially ordered equipment (sling, braces) if indicated.    Arrange for a responsible person to drive you to and from the hospital on the day of your surgery. Please confirm the visitor policy for the day of your procedure when you receive your phone call with an arrival time.     Call the surgeon's office with any new illnesses, exposures, or additional questions prior to surgery.    Please reference your “My Surgical Experience Booklet” for additional information to prepare for your upcoming surgery.

## 2025-05-28 DIAGNOSIS — T80.212A INFECTION OF CENTRAL VENOUS CATHETER EXIT SITE: Primary | ICD-10-CM

## 2025-05-28 RX ORDER — AMOXICILLIN AND CLAVULANATE POTASSIUM 250; 125 MG/1; MG/1
1 TABLET, FILM COATED ORAL EVERY 24 HOURS
Qty: 7 TABLET | Refills: 0 | Status: SHIPPED | OUTPATIENT
Start: 2025-05-28 | End: 2025-06-04

## 2025-06-05 ENCOUNTER — ANESTHESIA (OUTPATIENT)
Dept: PERIOP | Facility: HOSPITAL | Age: 45
End: 2025-06-05
Payer: COMMERCIAL

## 2025-06-05 ENCOUNTER — HOSPITAL ENCOUNTER (OUTPATIENT)
Facility: HOSPITAL | Age: 45
Setting detail: OUTPATIENT SURGERY
Discharge: HOME | End: 2025-06-05
Attending: SURGERY | Admitting: SURGERY
Payer: COMMERCIAL

## 2025-06-05 VITALS
HEIGHT: 68 IN | OXYGEN SATURATION: 93 % | RESPIRATION RATE: 15 BRPM | SYSTOLIC BLOOD PRESSURE: 141 MMHG | TEMPERATURE: 97.4 F | WEIGHT: 315 LBS | HEART RATE: 84 BPM | DIASTOLIC BLOOD PRESSURE: 62 MMHG | BODY MASS INDEX: 47.74 KG/M2

## 2025-06-05 DIAGNOSIS — N18.6 ESRD (END STAGE RENAL DISEASE) ON DIALYSIS (HCC): ICD-10-CM

## 2025-06-05 DIAGNOSIS — Z99.2 ESRD (END STAGE RENAL DISEASE) ON DIALYSIS (HCC): ICD-10-CM

## 2025-06-05 DIAGNOSIS — T80.212A INFECTION OF CENTRAL VENOUS CATHETER EXIT SITE: ICD-10-CM

## 2025-06-05 LAB
GLUCOSE SERPL-MCNC: 151 MG/DL (ref 65–140)
GLUCOSE SERPL-MCNC: 189 MG/DL (ref 65–140)

## 2025-06-05 PROCEDURE — NC001 PR NO CHARGE: Performed by: SURGERY

## 2025-06-05 PROCEDURE — 82948 REAGENT STRIP/BLOOD GLUCOSE: CPT

## 2025-06-05 PROCEDURE — C1768 GRAFT, VASCULAR: HCPCS | Performed by: SURGERY

## 2025-06-05 PROCEDURE — 36830 ARTERY-VEIN NONAUTOGRAFT: CPT | Performed by: SURGERY

## 2025-06-05 DEVICE — PROPATEN VASCULAR GRAFT SW 4-7MMX45CM TAPERED HEPARIN
Type: IMPLANTABLE DEVICE | Site: ARM | Status: FUNCTIONAL
Brand: GORE PROPATEN VASCULAR GRAFT

## 2025-06-05 RX ORDER — OXYCODONE HYDROCHLORIDE 5 MG/1
5 TABLET ORAL ONCE
Refills: 0 | Status: COMPLETED | OUTPATIENT
Start: 2025-06-05 | End: 2025-06-05

## 2025-06-05 RX ORDER — CHLORHEXIDINE GLUCONATE ORAL RINSE 1.2 MG/ML
15 SOLUTION DENTAL ONCE
Status: COMPLETED | OUTPATIENT
Start: 2025-06-05 | End: 2025-06-05

## 2025-06-05 RX ORDER — ALBUTEROL SULFATE 0.83 MG/ML
2.5 SOLUTION RESPIRATORY (INHALATION) ONCE AS NEEDED
Status: DISCONTINUED | OUTPATIENT
Start: 2025-06-05 | End: 2025-06-05 | Stop reason: HOSPADM

## 2025-06-05 RX ORDER — PROPOFOL 10 MG/ML
INJECTION, EMULSION INTRAVENOUS AS NEEDED
Status: DISCONTINUED | OUTPATIENT
Start: 2025-06-05 | End: 2025-06-05

## 2025-06-05 RX ORDER — LABETALOL HYDROCHLORIDE 5 MG/ML
10 INJECTION, SOLUTION INTRAVENOUS
Status: DISCONTINUED | OUTPATIENT
Start: 2025-06-05 | End: 2025-06-05 | Stop reason: HOSPADM

## 2025-06-05 RX ORDER — ONDANSETRON 2 MG/ML
INJECTION INTRAMUSCULAR; INTRAVENOUS AS NEEDED
Status: DISCONTINUED | OUTPATIENT
Start: 2025-06-05 | End: 2025-06-05

## 2025-06-05 RX ORDER — ROCURONIUM BROMIDE 10 MG/ML
INJECTION, SOLUTION INTRAVENOUS AS NEEDED
Status: DISCONTINUED | OUTPATIENT
Start: 2025-06-05 | End: 2025-06-05

## 2025-06-05 RX ORDER — DEXAMETHASONE SODIUM PHOSPHATE 10 MG/ML
INJECTION, SOLUTION INTRAMUSCULAR; INTRAVENOUS AS NEEDED
Status: DISCONTINUED | OUTPATIENT
Start: 2025-06-05 | End: 2025-06-05

## 2025-06-05 RX ORDER — SODIUM CHLORIDE, SODIUM LACTATE, POTASSIUM CHLORIDE, CALCIUM CHLORIDE 600; 310; 30; 20 MG/100ML; MG/100ML; MG/100ML; MG/100ML
125 INJECTION, SOLUTION INTRAVENOUS CONTINUOUS
Status: DISCONTINUED | OUTPATIENT
Start: 2025-06-05 | End: 2025-06-06 | Stop reason: HOSPADM

## 2025-06-05 RX ORDER — HYDROMORPHONE HCL/PF 1 MG/ML
0.5 SYRINGE (ML) INJECTION
Status: COMPLETED | OUTPATIENT
Start: 2025-06-05 | End: 2025-06-05

## 2025-06-05 RX ORDER — GLYCOPYRROLATE 0.2 MG/ML
INJECTION INTRAMUSCULAR; INTRAVENOUS AS NEEDED
Status: DISCONTINUED | OUTPATIENT
Start: 2025-06-05 | End: 2025-06-05

## 2025-06-05 RX ORDER — METOCLOPRAMIDE HYDROCHLORIDE 5 MG/ML
10 INJECTION INTRAMUSCULAR; INTRAVENOUS ONCE AS NEEDED
Status: DISCONTINUED | OUTPATIENT
Start: 2025-06-05 | End: 2025-06-05 | Stop reason: HOSPADM

## 2025-06-05 RX ORDER — MIDAZOLAM HYDROCHLORIDE 2 MG/2ML
INJECTION, SOLUTION INTRAMUSCULAR; INTRAVENOUS AS NEEDED
Status: DISCONTINUED | OUTPATIENT
Start: 2025-06-05 | End: 2025-06-05

## 2025-06-05 RX ORDER — ONDANSETRON 2 MG/ML
4 INJECTION INTRAMUSCULAR; INTRAVENOUS ONCE AS NEEDED
Status: DISCONTINUED | OUTPATIENT
Start: 2025-06-05 | End: 2025-06-05 | Stop reason: HOSPADM

## 2025-06-05 RX ORDER — BUPIVACAINE HYDROCHLORIDE 5 MG/ML
INJECTION, SOLUTION EPIDURAL; INTRACAUDAL; PERINEURAL AS NEEDED
Status: DISCONTINUED | OUTPATIENT
Start: 2025-06-05 | End: 2025-06-05 | Stop reason: HOSPADM

## 2025-06-05 RX ORDER — LIDOCAINE HYDROCHLORIDE 10 MG/ML
INJECTION, SOLUTION EPIDURAL; INFILTRATION; INTRACAUDAL; PERINEURAL AS NEEDED
Status: DISCONTINUED | OUTPATIENT
Start: 2025-06-05 | End: 2025-06-05

## 2025-06-05 RX ORDER — SODIUM CHLORIDE 9 MG/ML
21 INJECTION, SOLUTION INTRAVENOUS CONTINUOUS
Status: DISCONTINUED | OUTPATIENT
Start: 2025-06-05 | End: 2025-06-06 | Stop reason: HOSPADM

## 2025-06-05 RX ORDER — HYDRALAZINE HYDROCHLORIDE 20 MG/ML
5 INJECTION INTRAMUSCULAR; INTRAVENOUS
Status: DISCONTINUED | OUTPATIENT
Start: 2025-06-05 | End: 2025-06-05 | Stop reason: HOSPADM

## 2025-06-05 RX ORDER — HYDROMORPHONE HCL IN WATER/PF 6 MG/30 ML
0.2 PATIENT CONTROLLED ANALGESIA SYRINGE INTRAVENOUS
Status: DISCONTINUED | OUTPATIENT
Start: 2025-06-05 | End: 2025-06-05 | Stop reason: HOSPADM

## 2025-06-05 RX ORDER — PAPAVERINE HYDROCHLORIDE 30 MG/ML
INJECTION INTRAMUSCULAR; INTRAVENOUS AS NEEDED
Status: DISCONTINUED | OUTPATIENT
Start: 2025-06-05 | End: 2025-06-05 | Stop reason: HOSPADM

## 2025-06-05 RX ORDER — FENTANYL CITRATE 50 UG/ML
INJECTION, SOLUTION INTRAMUSCULAR; INTRAVENOUS AS NEEDED
Status: DISCONTINUED | OUTPATIENT
Start: 2025-06-05 | End: 2025-06-05

## 2025-06-05 RX ORDER — SODIUM CHLORIDE 9 MG/ML
1 INJECTION, SOLUTION INTRAVENOUS CONTINUOUS
Status: CANCELLED | OUTPATIENT
Start: 2025-06-05

## 2025-06-05 RX ORDER — PROMETHAZINE HYDROCHLORIDE 25 MG/ML
6.25 INJECTION, SOLUTION INTRAMUSCULAR; INTRAVENOUS ONCE AS NEEDED
Status: DISCONTINUED | OUTPATIENT
Start: 2025-06-05 | End: 2025-06-05 | Stop reason: HOSPADM

## 2025-06-05 RX ORDER — FENTANYL CITRATE/PF 50 MCG/ML
25 SYRINGE (ML) INJECTION
Status: DISCONTINUED | OUTPATIENT
Start: 2025-06-05 | End: 2025-06-05 | Stop reason: HOSPADM

## 2025-06-05 RX ORDER — CLINDAMYCIN PHOSPHATE 900 MG/50ML
INJECTION, SOLUTION INTRAVENOUS AS NEEDED
Status: DISCONTINUED | OUTPATIENT
Start: 2025-06-05 | End: 2025-06-05

## 2025-06-05 RX ADMIN — ROCURONIUM BROMIDE 20 MG: 10 INJECTION, SOLUTION INTRAVENOUS at 17:46

## 2025-06-05 RX ADMIN — HYDROMORPHONE HYDROCHLORIDE 0.5 MG: 1 INJECTION, SOLUTION INTRAMUSCULAR; INTRAVENOUS; SUBCUTANEOUS at 20:20

## 2025-06-05 RX ADMIN — SUGAMMADEX 200 MG: 100 INJECTION, SOLUTION INTRAVENOUS at 19:32

## 2025-06-05 RX ADMIN — FENTANYL CITRATE 25 MCG: 50 INJECTION INTRAMUSCULAR; INTRAVENOUS at 20:05

## 2025-06-05 RX ADMIN — DEXMEDETOMIDINE HYDROCHLORIDE 0.2 MCG/KG/HR: 100 INJECTION, SOLUTION INTRAVENOUS at 18:43

## 2025-06-05 RX ADMIN — HYDROMORPHONE HYDROCHLORIDE 0.2 MG: 0.2 INJECTION, SOLUTION INTRAMUSCULAR; INTRAVENOUS; SUBCUTANEOUS at 20:45

## 2025-06-05 RX ADMIN — ONDANSETRON 4 MG: 2 INJECTION INTRAMUSCULAR; INTRAVENOUS at 19:14

## 2025-06-05 RX ADMIN — HYDROMORPHONE HYDROCHLORIDE 0.5 MG: 1 INJECTION, SOLUTION INTRAMUSCULAR; INTRAVENOUS; SUBCUTANEOUS at 20:30

## 2025-06-05 RX ADMIN — HYDROMORPHONE HYDROCHLORIDE 0.2 MG: 0.2 INJECTION, SOLUTION INTRAMUSCULAR; INTRAVENOUS; SUBCUTANEOUS at 20:10

## 2025-06-05 RX ADMIN — HYDROMORPHONE HYDROCHLORIDE 0.2 MG: 0.2 INJECTION, SOLUTION INTRAMUSCULAR; INTRAVENOUS; SUBCUTANEOUS at 20:40

## 2025-06-05 RX ADMIN — FENTANYL CITRATE 25 MCG: 50 INJECTION INTRAMUSCULAR; INTRAVENOUS at 20:00

## 2025-06-05 RX ADMIN — HYDROMORPHONE HYDROCHLORIDE 0.5 MG: 1 INJECTION, SOLUTION INTRAMUSCULAR; INTRAVENOUS; SUBCUTANEOUS at 20:35

## 2025-06-05 RX ADMIN — PHENYLEPHRINE HYDROCHLORIDE 25 MCG/MIN: 50 INJECTION INTRAVENOUS at 17:06

## 2025-06-05 RX ADMIN — DEXAMETHASONE SODIUM PHOSPHATE 10 MG: 10 INJECTION, SOLUTION INTRAMUSCULAR; INTRAVENOUS at 16:57

## 2025-06-05 RX ADMIN — LIDOCAINE HYDROCHLORIDE 100 MG: 10 INJECTION, SOLUTION EPIDURAL; INFILTRATION; INTRACAUDAL; PERINEURAL at 16:56

## 2025-06-05 RX ADMIN — OXYCODONE HYDROCHLORIDE 5 MG: 5 TABLET ORAL at 22:34

## 2025-06-05 RX ADMIN — GLYCOPYRROLATE 0.2 MG: 0.2 INJECTION, SOLUTION INTRAMUSCULAR; INTRAVENOUS at 19:38

## 2025-06-05 RX ADMIN — PHENYLEPHRINE HYDROCHLORIDE 200 MCG: 50 INJECTION INTRAVENOUS at 16:56

## 2025-06-05 RX ADMIN — HYDROMORPHONE HYDROCHLORIDE 0.2 MG: 0.2 INJECTION, SOLUTION INTRAMUSCULAR; INTRAVENOUS; SUBCUTANEOUS at 20:15

## 2025-06-05 RX ADMIN — MIDAZOLAM 2 MG: 1 INJECTION INTRAMUSCULAR; INTRAVENOUS at 16:51

## 2025-06-05 RX ADMIN — SODIUM CHLORIDE 21 ML/HR: 0.9 INJECTION, SOLUTION INTRAVENOUS at 13:56

## 2025-06-05 RX ADMIN — FENTANYL CITRATE 25 MCG: 50 INJECTION INTRAMUSCULAR; INTRAVENOUS at 19:55

## 2025-06-05 RX ADMIN — HYDROMORPHONE HYDROCHLORIDE 0.5 MG: 1 INJECTION, SOLUTION INTRAMUSCULAR; INTRAVENOUS; SUBCUTANEOUS at 20:25

## 2025-06-05 RX ADMIN — PROPOFOL 250 MG: 10 INJECTION, EMULSION INTRAVENOUS at 16:56

## 2025-06-05 RX ADMIN — ROCURONIUM BROMIDE 50 MG: 10 INJECTION, SOLUTION INTRAVENOUS at 16:56

## 2025-06-05 RX ADMIN — CLINDAMYCIN PHOSPHATE 900 MG: 900 INJECTION, SOLUTION INTRAVENOUS at 17:00

## 2025-06-05 RX ADMIN — FENTANYL CITRATE 100 MCG: 50 INJECTION INTRAMUSCULAR; INTRAVENOUS at 16:56

## 2025-06-05 RX ADMIN — FENTANYL CITRATE 25 MCG: 50 INJECTION INTRAMUSCULAR; INTRAVENOUS at 19:50

## 2025-06-05 RX ADMIN — HYDROMORPHONE HYDROCHLORIDE 0.2 MG: 0.2 INJECTION, SOLUTION INTRAMUSCULAR; INTRAVENOUS; SUBCUTANEOUS at 20:55

## 2025-06-05 RX ADMIN — HYDROMORPHONE HYDROCHLORIDE 0.2 MG: 0.2 INJECTION, SOLUTION INTRAMUSCULAR; INTRAVENOUS; SUBCUTANEOUS at 20:50

## 2025-06-05 RX ADMIN — CHLORHEXIDINE GLUCONATE 15 ML: 1.2 SOLUTION ORAL at 13:32

## 2025-06-05 NOTE — ANESTHESIA POSTPROCEDURE EVALUATION
Post-Op Assessment Note    CV Status:  Stable  Pain Score: 0    Pain management: adequate       Mental Status:  Alert and awake   Hydration Status:  Euvolemic   PONV Controlled:  Controlled   Airway Patency:  Patent     Post Op Vitals Reviewed: Yes    No anethesia notable event occurred.    Staff: Anesthesiologist, with CRNAs           Last Filed PACU Vitals:  Vitals Value Taken Time   Temp 97.8    Pulse 91 06/05/25 19:46   /61 06/05/25 19:45   Resp 21    SpO2 94 % 06/05/25 19:46   Vitals shown include unfiled device data.

## 2025-06-05 NOTE — OP NOTE
OPERATIVE REPORT  PATIENT NAME: Joaquin Frankel    :  1980  MRN: [id number]  Pt Location: [facility] OR ROOM 02    SURGERY DATE: 2025    Surgeons and Role:     * Scout James DO - Primary     * Julita Kemp MD - Assisting     * Colt Garcia MD - Assisting    Preop Diagnosis:  ESRD (end stage renal disease) (HCC) [N18.6]    Post-Op Diagnosis Codes:     * ESRD (end stage renal disease) (HCC) [N18.6]    Procedure(s):  Left upper extremity- CREATION ARTERIOVENOUS GRAFT    Estimated Blood Loss:   Minimal    Anesthesia Type:   General    Operative Indications:  44 yo M with ESRD on HD presents for creation of permanent dialysis access. He has had multiple failed arteriovenous fistula attempts, therefore, he was offered graft placement.    Complications:   None    Procedure and Technique:  The patient was brought to the operative suite and placed in supine position.  Anesthesia was induced and titrated to effect and the patient was intubated.  Preoperative antibiotics were administered.  The patient was then prepped and draped in usual sterile fashion a timeout was performed per hospital policy correctly identifying the patient, procedure, and laterality.     A #15 blade was used to create a longitudinal incision just above the antecubital crease.  Dissection was carried down through skin and subcutaneous tissue with electrocautery.  The brachial sheath was entered atraumatically and the brachial artery was identified.  The artery was circumferentially dissected and encircled with a Vesseloop for control.  A #15 blade was then used to create another longitudinal incision proximally in the arm just distal to the axilla.  The incision was deepened with Bovie cautery. The axillary vein was identified and encircled with a Silastic Vesseloop.    A Liliya-Wick tunneler was used to create a subcutaneous tunnel between the 2 incisions.  A 4-7 mm tapered PTFE graft was then sewn to the tunneling device with a  2-0 silk tie.  The graft was then pulled through the subcutaneous tunnel ensuring to maintain proper orientation.  The graft was also pulled to length ensuring one-to-one motion.    The brachial artery was then clamped for proximal and distal control. A #11 blade was used to create a longitudinal arteriotomy, which was extended with King scissors.  The 4 mm end of the graft was then cut to length and then anastomosis was created with the artery using running 6-0 Prolene suture. The graft was then clamped with an atraumatic vascular clamp and the clamps were released on the radial artery and restoring flow to the hand. We then turned our attention to the venous anastomosis.  The vein was controlled proximally and distally and a #11 blade was used to create a longitudinal venotomy which was extended with King scissors. The graft was again pulled to length ensuring one-to-one motion and was trimmed to length and generously spatulated.  The anastomosis was created with running 5-0 Prolene suture.  The graft and vein were de-aired prior to completion of the anastomosis.  At this point there was a good signal in the axillary vein as well as the radial artery.  The wounds were then thoroughly irrigated and hemostasis was ensured.  The seal was applied to the anastomoses and base of both wounds.  Local anesthetic was injected surrounding both incision sites.  Both incision sites were then closed in layers using a running 3-0 Vicryl suture in the deep dermal layer and a running 4-0 Monocryl and the skin layer.  The skin was cleaned and dried and surgical glue was applied.    The patient was then allowed to emerge from anesthesia and was extubated.  He tolerated the procedure well with no immediate postoperative complications.  All counts were correct as reported to me.  He was taken to PACU in stable condition.  Of note, Dr. James was present for the entirety of the procedure.       SIGNATURE: Julita Kemp MD  DATE: June  5, 2025  TIME: 7:10 PM

## 2025-06-05 NOTE — DISCHARGE INSTR - AVS FIRST PAGE
DISCHARGE INSTRUCTIONS  DIALYSIS GRAFT SURGERY    ACTIVITY:  Limit use of the operated arm to what is necessary for the first day after surgery. On the second day after surgery, you may start to increase use of your arm as tolerated.  Avoid heavy lifting (no more than 15 lbs) for the first one week.  You should start to exercise your hand on the side of the graft by squeezing a stress ball or a rolled-up sock. This increases blood flow in your graft and arm so your it will function better.    Feel for a thrill every day. The thrill is the vibration or pulse you feel over the graft that means the blood is flowing through it. If you cannot feel a thrill, call our office ([phone]).    DIET:   Resume your normal diet.  Good nutrition is important for healing of your incision.    DRESSING:   You may have surgical glue at your surgical site. There are stitches present under the skin which will absorb on their own.  The glue is used to cover the access, assist in closure, and prevent contamination. This adhesive will darken and peel away on its own within one to two weeks. Do not pick at it.  If you have a dressing over your surgical site, remove this on the second day after surgery.    INCISION:   If you do not have a dialysis catheter in place, you may shower and get your incision wet.  Wash incision daily with soap and water, but do not rub or scrub the incision; rinse thoroughly and pat dry.  You may have stitches or staples to close your incision and it is okay for these to get wet.  Do not bathe in a tub or swim for the first 4 week following surgery or if you have any open wounds.  It is normal to have mild swelling or discoloration around the incision.   If increasing redness or pain develops, call our office immediately.  Numbness in the region of the incision may occur following the surgery.  This normally improves over six to twelve months.  If you have numbness or pain in your hand, please call our office  immediately.  DO NOT put any powders, creams, ointments, or lotions on your incision.     ARM SWELLING:    Most patients have some noticeable arm swelling after surgery.  This usually disappears within a few weeks.  If swelling is present, elevate the arm whenever possible.      RESTRICTIONS:   Do NOT have blood draws, IV's, or blood pressures performed on the operated arm.    GRAFT USE:    Your graft will be used for dialysis after the pain and swelling has diminished. This takes at least two weeks after graft placement.  If you are using a catheter for dialysis, this will not be removed until after your graft is being used for dialysis without any issues.    FOLLOW UP APPOINTMENTS:  Making and keeping follow up appointments and ultrasound tests are important to your recovery.  If you have difficulty making it to or keeping your follow up appointments, call the office.    If you have increased pain, fever >101.5, increased drainage, redness or a bad smell at your surgery site, new coldness/numbness of your arm or leg, please call us immediately and GO directly to the ER.    PLEASE CALL THE OFFICE IF YOU HAVE ANY QUESTIONS  [phone]  [phone] [address], [address], [place] [address]  [address] [place] [address]  [address] [place] [address]  [address] [facility], [place] [address]  [address] [facility], [address], [place] [address]  [address] [facility], [address], [place] [address]  [address] [facility], [facility] [address], 2nd Floor, [place] [address]  [address] [facility] [place], 1st Floor, [address], [place] [address]  [address] [facility] [place] [address]  [address] [facility], [address], [place] [address]

## 2025-06-05 NOTE — ANESTHESIA PREPROCEDURE EVALUATION
Procedure:  CREATION FISTULA ARTERIOVENOUS (AV) GRAFT (Left: Arm Upper)    Relevant Problems   ANESTHESIA (within normal limits)      CARDIO   (+) Coronary artery disease with angina pectoris ([facility])   (+) History of PTCA with stent   (+) MI (myocardial infarction) (HCC)   (+) Other hyperlipidemia   (+) Primary hypertension   (+) hx of Pulmonary embolism (HCC)      /RENAL   (+) Dialysis patient (HCC)   (+) ESRD (end stage renal disease) on dialysis (HCC)      HEMATOLOGY   (+) Anemia of chronic disease      PULMONARY   (+) ANGY (obstructive sleep apnea)      Oncology   (+) MGUS (monoclonal gammopathy of unknown significance)      Surgery/Wound/Pain   (+) History of coronary angioplasty with insertion of stent      Other   (+) Morbid obesity ([facility])   Interpretation Summary         Stress ECG: The stress ECG is equivocal for ischemia after pharmacologic vasodilation.    Perfusion: There is a left ventricular perfusion defect that is small to medium in size present in the apical location(s) that is predominantly fixed.    Stress Combined Conclusion: Left ventricular perfusion is probably abnormal.    Stress Function: Left ventricular function post-stress is normal. Stress ejection fraction is 58%.    Perfusion Defect Conclusion: The stress/rest perfusion ratio is 1.10. There is no evidence of transient ischemic dilation (TID).    Normal sinus rhythm  Normal ECG  When compared with ECG of 05-JUL-2024 00:44, (unconfirmed)  No significant change was found    Findings    Left Ventricle Left ventricular cavity size is normal. Wall thickness is mildly increased. There is mild concentric hypertrophy. The left ventricular ejection fraction is %. Systolic function is normal. Wall motion is normal. Diastolic function is normal.   Right Ventricle Right ventricular cavity size is normal. Systolic function is normal. Wall thickness is normal.   Left Atrium The atrium is normal in size.   Right Atrium The atrium is normal in size.    Aortic Valve The aortic valve is trileaflet. The leaflets are not thickened. The leaflets are not calcified. The leaflets exhibit normal mobility. There is no evidence of regurgitation. The aortic valve has no significant stenosis.   Mitral Valve There is no evidence of regurgitation. There is no evidence of stenosis. The mitral valve has normal structure and normal function.   Tricuspid Valve Tricuspid valve structure is normal. There is trace regurgitation. There is no evidence of stenosis. The estimated right ventricular systolic pressure is 29.00 mmHg.   Pulmonic Valve Pulmonic valve structure is normal. There is no evidence of regurgitation. There is no evidence of stenosis.   Ascending Aorta The aortic root is normal in size.   IVC/SVC The right atrial pressure is estimated at 10.0 mmHg. The inferior vena cava is normal in size.   Pericardium There is no pericardial effusion. The pericardium is normal in appearance.     Left Ventricle Measurements    Function/Volumes   A4C EF 65 %              Physical Exam    Airway     Mallampati score: III  TM Distance: >3 FB  Neck ROM: full  Mouth opening: >= 4 cm      Cardiovascular  Rhythm: regular, Rate: normal    Dental        Pulmonary  Pulmonary exam normal Breath sounds clear to auscultation    Neurological  - normal exam  He appears awake, alert and oriented x3.      Other Findings        Anesthesia Plan  ASA Score- 4     Anesthesia Type- general with ASA Monitors.         Additional Monitors:     Airway Plan: Oral ETT.           Plan Factors-Exercise tolerance (METS): >4 METS.    Chart reviewed.   Existing labs reviewed. Patient summary reviewed.    Patient is not a current smoker.              Induction- intravenous.    Postoperative Plan- .   Monitoring Plan - Monitoring plan - standard ASA monitoring  Post Operative Pain Plan - multimodal analgesia        Informed Consent- Anesthetic plan and risks discussed with patient.  I personally reviewed this patient  with the CRNA. Discussed and agreed on the Anesthesia Plan with the CRNA..      NPO Status:  Vitals Value Taken Time   Date of last liquid 06/05/25 06/05/25 13:12   Time of last liquid 0900 06/05/25 13:12   Date of last solid 06/04/25 06/05/25 13:12   Time of last solid 1700 06/05/25 13:12

## 2025-06-05 NOTE — H&P
Acute Care Surgery  History and Physical  Joaquin Frankel 45 y.o. male MRN: [id number]  Unit/Bed#: OR POOL Encounter: [id number]    Assessment and Plan:  45 yo M h/o ESRD on HD presents for creation of permanent dialysis access.    -OR today  -NPO for OR      History of Present Illness   44-year-old male well-known to me presents for follow-up for dialysis access. He initially had a proximal left radiocephalic fistula created which failed to mature and had a two-stage left upper arm brachiobasilic fistula. The second stage was done about 6 weeks ago he had some superficial wound dehiscence which has now resolved unfortunately his basilic fistula was just under 6 mm at the time of his elevation and postoperatively it has been difficult to palpate a thrill. By Doppler his fistula does appear patent although it is clearly not accessible at this time. Repeat imaging showed fistula is nonslavageable, therefore, he was offered AVG creation.     Review of Systems   Respiratory:  Negative for chest tightness.    Cardiovascular:  Negative for chest pain.   All other systems reviewed and are negative.      Historical Information   Past Medical History[1]  Past Surgical History[2]  Social History   Social History     Substance and Sexual Activity   Alcohol Use Not Currently     Social History     Substance and Sexual Activity   Drug Use Never     Tobacco Use History[3]  Family History: Family History[4]    Meds/Allergies   PTA meds:   Prior to Admission Medications   Prescriptions Last Dose Informant Patient Reported? Taking?   Cholecalciferol (VITAMIN D3) 1,000 units tablet 6/4/2025 Self No Yes   Sig: Take 2 tablets (2,000 Units total) by mouth daily   Semaglutide-Weight Management (Wegovy) 0.25 MG/0.5ML  Self Yes No   Sig: Inject 0.25 mg under the skin Once a week   Patient not taking: Reported on 5/19/2025   amoxicillin-clavulanate (AUGMENTIN) 250-125 mg per tablet   No No   Sig: Take 1 tablet by mouth every 24 hours for  "7 days (Take after dialysis)   aspirin (ECOTRIN LOW STRENGTH) 81 mg EC tablet 6/4/2025 Self No Yes   Sig: Take 1 tablet (81 mg total) by mouth daily   atorvastatin (LIPITOR) 80 mg tablet 6/4/2025 Self No Yes   Sig: Take 1 tablet (80 mg total) by mouth daily   calcitriol (ROCALTROL) 0.25 mcg capsule Unknown Self No No   Sig: Take 3 capsules (0.75 mcg total) by mouth 3 (three) times a week Mon, Wed, Fri   calcium acetate (PHOSLO) capsule 6/4/2025 Self No Yes   Sig: Take 2 capsules (1,334 mg total) by mouth 3 (three) times a day   carvedilol (COREG) 6.25 mg tablet 6/4/2025 Self No Yes   Sig: Take 0.5 tablets (3.125 mg total) by mouth 2 (two) times a day with meals   isosorbide mononitrate (IMDUR) 60 mg 24 hr tablet 6/4/2025 Self No Yes   Sig: Take 1 tablet (60 mg total) by mouth daily   metolazone (ZAROXOLYN) 10 mg tablet 6/1/2025 Self No Yes   Sig: Take 1 tablet (10 mg total) by mouth 2 (two) times a week On Monday, Thursday   Patient taking differently: Take 10 mg by mouth 2 (two) times a week On Saturday and Sunday   sevelamer (RENAGEL) 800 mg tablet 6/3/2025 Self No Yes   Sig: Take 1 tablet (800 mg total) by mouth 3 (three) times a day with meals   Patient taking differently: Take 800 mg by mouth in the morning and 800 mg at noon and 800 mg in the evening. Take with meals. Takes only with snack as needed.   torsemide (DEMADEX) 100 mg tablet 6/4/2025 Self No Yes   Sig: TAKE ONE TABLET BY MOUTH EVERY DAY (GENERIC FOR DEMADEX)      Facility-Administered Medications: None     Allergies[5]    Objective   First Vitals:   Blood Pressure: 150/94 (06/05/25 1310)  Pulse: 93 (06/05/25 1310)  Temperature: 98.5 °F (36.9 °C) (06/05/25 1310)  Temp Source: Temporal (06/05/25 1310)  Respirations: 18 (06/05/25 1310)  Height: 5' 8\" (172.7 cm) (06/05/25 1310)  Weight - Scale: (!) 166 kg (367 lb) (06/05/25 1310)  SpO2: 95 % (06/05/25 1310)    Current Vitals:   Blood Pressure: 150/94 (06/05/25 1310)  Pulse: 93 (06/05/25 " "1310)  Temperature: 98.5 °F (36.9 °C) (06/05/25 1310)  Temp Source: Temporal (06/05/25 1310)  Respirations: 18 (06/05/25 1310)  Height: 5' 8\" (172.7 cm) (06/05/25 1310)  Weight - Scale: (!) 166 kg (367 lb) (06/05/25 1310)  SpO2: 95 % (06/05/25 1310)    No intake or output data in the 24 hours ending 06/05/25 1615    Invasive Devices       Peripheral Intravenous Line  Duration             Peripheral IV 06/05/25 Dorsal (posterior);Right Forearm <1 day              Line  Duration             Hemodialysis Access 09/26/24 Left Forearm 252 days    Hemodialysis Access 12/23/24 Left Upper arm 164 days              Hemodialysis Catheter  Duration             HD Permanent Double Catheter 119 days    HD Permanent Double Catheter 30 days                    Physical Exam    Cardiovascular:      Rate and Rhythm: Normal rate.   Pulmonary:      Effort: Pulmonary effort is normal. No respiratory distress.   Abdominal:      General: There is no distension.      Palpations: Abdomen is soft.      Tenderness: There is no abdominal tenderness.     Skin:     Coloration: Skin is not jaundiced.     Neurological:      Mental Status: He is alert.       General  Exam: alert, awake, oriented, no distress, consistent with stated age     Upper Extremity:  Palpation: Radial pulse- Bilateral 2+  Left upper arm incision healed  No thrill palpable  Basilic fistula patent by doppler  Left hand warm, normal LUE  strength        Neurologic  Exam:alert, non-focal, oriented x 3    Counseling / Coordination of Care  Total floor / unit time spent today 20 minutes. This involved direct patient contact where I performed a full history and physical, reviewed previous records, and reviewed laboratory and other diagnostic studies. Greater than 50% of total time was spent with the patient and / or family counseling and / or coordination of care.    Julita Kemp MD  6/5/2025       [1]   Past Medical History:  Diagnosis Date    Acute hypoxic respiratory " "failure (Formerly [facility]) 10/07/2023    Arthritis     Breathing difficulty     Cellulitis 10/07/2023    Chronic kidney disease     end stage renal disease    Coronary artery disease     Diabetes mellitus (Formerly [facility])     Diabetic neuropathy ([facility]) 05/28/2021    Heart disease     CAD   s/p ptca with 1 stent 2012    Hidradenitis suppurativa     groin    History of transfusion     Hyperlipidemia     Hypertension     MI (myocardial infarction) (Formerly [facility])     \" silent \" M.I. in the past    Morbid obesity with BMI of 50.0-59.9, adult (Formerly [facility])     Myocardial infarction (Formerly [facility])     mini heart attack    Nicotine dependence     Obesity     PE (pulmonary thromboembolism) ([facility]) 06/2024    small on left side, takes coumadin    Pilonidal cyst     Type 1 non-ST elevation myocardial infarction (NSTEMI) (Formerly [facility]) 11/29/2020   [2]   Past Surgical History:  Procedure Laterality Date    CARDIAC SURGERY      CORONARY ANGIOPLASTY WITH STENT PLACEMENT      x2    INCISION AND DRAINAGE OF WOUND N/A 01/24/2017    Procedure: INCISION AND DRAINAGE (I&D) BUTTOCK, PILONIDAL CYST;  Surgeon: Simba Adhikari MD;  Location: [facility];  Service:     INCISION AND DRAINAGE OF WOUND Right 4/30/2025    Procedure: INCISION AND DRAINAGE (I&D) GROIN;  Surgeon: Stef Mooney MD;  Location: [facility];  Service: General    IR BIOPSY BONE MARROW  06/12/2024    IR BIOPSY KIDNEY RANDOM  07/12/2024    IR BIOPSY KIDNEY RANDOM  07/19/2024    IR TEMPORARY DIALYSIS CATHETER PLACEMENT  06/06/2024    IR TUNNELED CENTRAL LINE PLACEMENT  06/14/2024    IR TUNNELED DIALYSIS CATHETER CHECK/CHANGE/REPOSITION/ANGIOPLASTY  1/2/2025    IR TUNNELED DIALYSIS CATHETER CHECK/CHANGE/REPOSITION/ANGIOPLASTY  2/5/2025    IR TUNNELED DIALYSIS CATHETER CHECK/CHANGE/REPOSITION/ANGIOPLASTY  5/6/2025    LEG SURGERY Left     I&D of left leg 2012. Had resp failure and had to be intubated during procedure.    [place] ARTERIOVENOUS ANASTOMOSIS OPEN DIRECT Left 09/26/2024    Procedure: left arm radio-cephalic AVF creation;  Surgeon: " Scout James DO;  Location: [facility];  Service: Vascular    ME ARTERIOVENOUS ANASTOMOSIS OPEN DIRECT Left 12/23/2024    Procedure: left arm brachio-basilic AVF creation;  Surgeon: Scout James DO;  Location: [facility];  Service: Vascular    ME DEBRIDEMENT SUBCUTANEOUS TISSUE 1ST 20 SQ CM/< Left 07/06/2021    Procedure: DEBRIDEMENT WOUND (WASH OUT);  Surgeon: Sudheer Mcfarlane MD;  [facility]: [facility];  Service: General    ME EXC B9 LESION MRGN XCP SK TG T/A/L >4.0 CM Left 04/06/2021    Procedure: EXCISION THIGH MASS;  Surgeon: Sudheer Mcfarlane MD;  [facility];  Service: General    ME EXCISION H/P/P/U COMPLEX REPAIR Left 07/06/2021    Procedure: EXCISION PERINEAL ABSCESS LEFT THIGH;  Surgeon: Sudheer Mcfarlane MD;  [facility];  Service: General    ME NEGATIVE PRESSURE WOUND THERAPY DME <= 50 SQ CM Left 04/06/2021    Procedure: APPLICATION VAC DRESSING THIGH;  Surgeon: Sudheer Mcfarlane MD;  [facility];  Service: General    ME REVJ OPN ARVEN FSTL W/O THRMBC DIAL GRF Left 3/6/2025    Procedure: left arm basilic fistula elevation;  Surgeon: Scout James DO;  [facility];  Service: Vascular    WOUND DEBRIDEMENT Left 04/17/2021    Procedure: DEBRIDEMENT WOUND AND DRESSING CHANGE (WASH OUT);  Surgeon: Mahin Traore DO;  [facility];  Service: General    WOUND DEBRIDEMENT Left 04/22/2021    Procedure: DEBRIDEMENT LOWER EXTREMITY (WASH OUT), left groin and thigh;  Surgeon: Sudheer Mcfarlane MD;  [facility];  Service: General    WOUND DEBRIDEMENT Left 05/26/2021    Procedure: DEBRIDEMENT LOWER EXTREMITY (WASH OUT);  Surgeon: Zak Castanon DO;  [facility];  Service: General    WOUND DEBRIDEMENT Left 05/28/2021    Procedure: Washout left thigh wound and closure;  Surgeon: Amor Jaramillo DO;  [facility];  Service: General   [3]   Social History  Tobacco Use   Smoking Status Former    Current packs/day: 0.00    Average packs/day: 1.5 packs/day for 20.0 years  (29.9 ttl pk-yrs)    Types: Cigarettes    Start date: 2021    Quit date: 2021    Years since quittin.2    Passive exposure: Past   Smokeless Tobacco Never   [4]   Family History  Problem Relation Name Age of Onset    Heart disease Father      Diabetes Father      Hypertension Mother      Heart disease Mother     [5]   Allergies  Allergen Reactions    Keflex [Cephalexin] Facial Swelling and Lip Swelling    Amoxicillin Hives     childhood

## 2025-06-06 ENCOUNTER — TELEPHONE (OUTPATIENT)
Age: 45
End: 2025-06-06

## 2025-06-06 DIAGNOSIS — N18.6 ESRD (END STAGE RENAL DISEASE) ON DIALYSIS (HCC): ICD-10-CM

## 2025-06-06 DIAGNOSIS — N18.6 ESRD (END STAGE RENAL DISEASE) ON DIALYSIS (HCC): Primary | ICD-10-CM

## 2025-06-06 DIAGNOSIS — Z99.2 ESRD (END STAGE RENAL DISEASE) ON DIALYSIS (HCC): ICD-10-CM

## 2025-06-06 DIAGNOSIS — Z99.2 ESRD (END STAGE RENAL DISEASE) ON DIALYSIS (HCC): Primary | ICD-10-CM

## 2025-06-06 RX ORDER — OXYCODONE AND ACETAMINOPHEN 5; 325 MG/1; MG/1
1 TABLET ORAL EVERY 6 HOURS PRN
Qty: 20 TABLET | Refills: 0 | Status: SHIPPED | OUTPATIENT
Start: 2025-06-06 | End: 2025-06-06

## 2025-06-06 RX ORDER — OXYCODONE AND ACETAMINOPHEN 5; 325 MG/1; MG/1
1 TABLET ORAL EVERY 6 HOURS PRN
Qty: 20 TABLET | Refills: 0 | Status: SHIPPED | OUTPATIENT
Start: 2025-06-06

## 2025-06-06 NOTE — TELEPHONE ENCOUNTER
Reviewed. Prescription for percocet sent to [facility]. Please advise patient that he should elevate arm as often as possible to minimize swelling

## 2025-06-06 NOTE — TELEPHONE ENCOUNTER
"S/p creation fistula yesterday  Pt called asking for pain medications. He stated he did not receive any before leaving the hospital 11 pm last night and experiencing pain this morning.   Pain 10/10 with significant edema, \"looks like a baseball\", and reporting difficulty lifting the arm to elevate.   Currently at dialysis and they could feel the thrill.   He would like the script to go to [facility] in [place].   Please call back when completed  "

## 2025-06-09 ENCOUNTER — TELEPHONE (OUTPATIENT)
Dept: VASCULAR SURGERY | Facility: CLINIC | Age: 45
End: 2025-06-09

## 2025-06-09 ENCOUNTER — PATIENT MESSAGE (OUTPATIENT)
Dept: VASCULAR SURGERY | Facility: CLINIC | Age: 45
End: 2025-06-09

## 2025-06-09 DIAGNOSIS — Z99.2 ESRD (END STAGE RENAL DISEASE) ON DIALYSIS (HCC): ICD-10-CM

## 2025-06-09 DIAGNOSIS — N18.6 ESRD (END STAGE RENAL DISEASE) ON DIALYSIS (HCC): ICD-10-CM

## 2025-06-09 NOTE — TELEPHONE ENCOUNTER
Vascular Nurse Navigator Post Op Call    Procedure: Left upper extremity- CREATION ARTERIOVENOUS GRAFT     Date of Procedure: 6/5/25    Surgeon:   * Scout James DO - Primary     * Julita Kemp MD - Assisting     * Colt Garcia MD - Assisting      Painful tingling or numbness in your fingers?: No    Paleness/Coolness in hands/fingers?: No    Redness, swelling or pus from your wound?: see below    Bleeding?: No    Thrill present?: Yes    Anticoagulation pt was discharged on post op?: Aspirin    Statin pt was discharged on post op?:  Lipitor (atorvastatin)    Fever/chills?: No    Uncontrolled Pain?: Yes      Reviewed discharge instructions and incision care with patient.      Dialysis Days and Location: [facility] at [facility]    NEXT SCHEDULED OFFICE VISIT:  6/20/25 at 3 pm with Dr. James at [facility]    Transportation Confirmed?: Yes      Any Questions or Concerns?    Patient stated that he is doing okay since procedure.  He stated that he continues to have pain in his bicep area that is an 8 out of 10 on the pain scale.  He stated that he has been taking the Percocet every 6 hours along with Tylenol and Advil.  Advised him not to take more than 4000 mg of Tylenol in a 24 hour period as Percocet includes Tylenol.  He stated that he only has 2 pills left of the Percocet and was inquiring if he can get more.  He stated that he hardly slept over the past few days due to the pain.  He stated that his upper arm is swollen as well.  He stated that his incision in the arm pit area is draining a light red liquid and he has been changing the abd pad (folded over) twice a day and when he changes it, it is moist.  He denies any open areas, but stated it is draining from the area where the glue fell off.  He stated that he is washing the incision with soap and water.  Requested that he send a picture via [employer] of the area that is draining for the provider to see.  Informed him that I would send a  message to out triage provider to inquire about pain medication as well as recommendation for area that is draining.  He was agreeable to same.

## 2025-06-09 NOTE — TELEPHONE ENCOUNTER
Contacted patient to inform him of recommendations and information from Cori Szymanski PA-C.  Verbal understanding received.

## 2025-06-09 NOTE — TELEPHONE ENCOUNTER
Reviewed. PDMP reviewed. Patient was given 5 day supply of Percocet every 6 hours following his procedure. He is taking percocet more than prescribed and a refill is not appropriate at this time. I encourage him to continue local wound care and elevate his arm on 2 pillows when able to assist with swelling. He may utilize tylenol ES as needed for pain and should not exceed more than 4000 mg/day. If he develops any signs of infection or worsening pain, he should notify the office or go to the ER. Will await photo of incisions.

## 2025-06-09 NOTE — ANESTHESIA POSTPROCEDURE EVALUATION
Post-Op Assessment Note    CV Status:  Stable  Pain Score: 0    Pain management: adequate       Mental Status:  Alert and awake   Hydration Status:  Stable   PONV Controlled:  Controlled   Airway Patency:  Patent  Airway: intubated     Post Op Vitals Reviewed: Yes    No anethesia notable event occurred.    Staff: Anesthesiologist           Last Filed PACU Vitals:  Vitals Value Taken Time   Temp 97.4 °F (36.3 °C) 06/05/25 21:10   Pulse 91 06/05/25 21:12   /62 06/05/25 21:10   Resp 15 06/05/25 21:10   SpO2 95 % 06/05/25 21:12   Vitals shown include unfiled device data.    Modified Zachary:     Vitals Value Taken Time   Activity 2 06/05/25 21:10   Respiration 2 06/05/25 21:10   Circulation 2 06/05/25 21:10   Consciousness 2 06/05/25 21:10   Oxygen Saturation 1 06/05/25 21:10     Modified Zachary Score: 9

## 2025-06-18 ENCOUNTER — APPOINTMENT (EMERGENCY)
Dept: RADIOLOGY | Facility: HOSPITAL | Age: 45
End: 2025-06-18
Attending: RADIOLOGY
Payer: COMMERCIAL

## 2025-06-18 ENCOUNTER — HOSPITAL ENCOUNTER (EMERGENCY)
Facility: HOSPITAL | Age: 45
Discharge: HOME | End: 2025-06-18
Attending: EMERGENCY MEDICINE
Payer: COMMERCIAL

## 2025-06-18 ENCOUNTER — APPOINTMENT (EMERGENCY)
Dept: RADIOLOGY | Facility: HOSPITAL | Age: 45
End: 2025-06-18
Payer: COMMERCIAL

## 2025-06-18 VITALS
HEIGHT: 68 IN | OXYGEN SATURATION: 99 % | TEMPERATURE: 98.9 F | DIASTOLIC BLOOD PRESSURE: 66 MMHG | SYSTOLIC BLOOD PRESSURE: 148 MMHG | HEART RATE: 72 BPM | BODY MASS INDEX: 55.8 KG/M2 | RESPIRATION RATE: 19 BRPM

## 2025-06-18 DIAGNOSIS — Z99.2 ESRD (END STAGE RENAL DISEASE) ON DIALYSIS (HCC): ICD-10-CM

## 2025-06-18 DIAGNOSIS — N18.6 ESRD (END STAGE RENAL DISEASE) ON DIALYSIS (HCC): ICD-10-CM

## 2025-06-18 DIAGNOSIS — T82.9XXA COMPLICATION ASSOCIATED WITH DIALYSIS CATHETER: Primary | ICD-10-CM

## 2025-06-18 LAB
ANION GAP SERPL CALCULATED.3IONS-SCNC: 12 MMOL/L (ref 4–13)
APTT PPP: 26 SECONDS (ref 23–34)
BASOPHILS # BLD AUTO: 0.06 THOUSANDS/ÂΜL (ref 0–0.1)
BASOPHILS NFR BLD AUTO: 1 % (ref 0–1)
BUN SERPL-MCNC: 59 MG/DL (ref 5–25)
CALCIUM SERPL-MCNC: 8.8 MG/DL (ref 8.4–10.2)
CHLORIDE SERPL-SCNC: 98 MMOL/L (ref 96–108)
CO2 SERPL-SCNC: 25 MMOL/L (ref 21–32)
CREAT SERPL-MCNC: 8.39 MG/DL (ref 0.6–1.3)
EOSINOPHIL # BLD AUTO: 0.54 THOUSAND/ÂΜL (ref 0–0.61)
EOSINOPHIL NFR BLD AUTO: 5 % (ref 0–6)
ERYTHROCYTE [DISTWIDTH] IN BLOOD BY AUTOMATED COUNT: 14.6 % (ref 11.6–15.1)
GFR SERPL CREATININE-BSD FRML MDRD: 6 ML/MIN/1.73SQ M
GLUCOSE SERPL-MCNC: 170 MG/DL (ref 65–140)
HCT VFR BLD AUTO: 28.4 % (ref 36.5–49.3)
HGB BLD-MCNC: 9.5 G/DL (ref 12–17)
IMM GRANULOCYTES # BLD AUTO: 0.07 THOUSAND/UL (ref 0–0.2)
IMM GRANULOCYTES NFR BLD AUTO: 1 % (ref 0–2)
INR PPP: 0.89 (ref 0.85–1.19)
LYMPHOCYTES # BLD AUTO: 1.53 THOUSANDS/ÂΜL (ref 0.6–4.47)
LYMPHOCYTES NFR BLD AUTO: 14 % (ref 14–44)
MCH RBC QN AUTO: 32.5 PG (ref 26.8–34.3)
MCHC RBC AUTO-ENTMCNC: 33.5 G/DL (ref 31.4–37.4)
MCV RBC AUTO: 97 FL (ref 82–98)
MONOCYTES # BLD AUTO: 0.69 THOUSAND/ÂΜL (ref 0.17–1.22)
MONOCYTES NFR BLD AUTO: 6 % (ref 4–12)
NEUTROPHILS # BLD AUTO: 8.08 THOUSANDS/ÂΜL (ref 1.85–7.62)
NEUTS SEG NFR BLD AUTO: 73 % (ref 43–75)
NRBC BLD AUTO-RTO: 0 /100 WBCS
PLATELET # BLD AUTO: 263 THOUSANDS/UL (ref 149–390)
PMV BLD AUTO: 8.8 FL (ref 8.9–12.7)
POTASSIUM SERPL-SCNC: 4.1 MMOL/L (ref 3.5–5.3)
PROTHROMBIN TIME: 12.7 SECONDS (ref 12.3–15)
RBC # BLD AUTO: 2.92 MILLION/UL (ref 3.88–5.62)
SODIUM SERPL-SCNC: 135 MMOL/L (ref 135–147)
WBC # BLD AUTO: 10.97 THOUSAND/UL (ref 4.31–10.16)

## 2025-06-18 PROCEDURE — 71045 X-RAY EXAM CHEST 1 VIEW: CPT

## 2025-06-18 PROCEDURE — C1894 INTRO/SHEATH, NON-LASER: HCPCS

## 2025-06-18 PROCEDURE — C1750 CATH, HEMODIALYSIS,LONG-TERM: HCPCS

## 2025-06-18 PROCEDURE — C1769 GUIDE WIRE: HCPCS

## 2025-06-18 PROCEDURE — 99285 EMERGENCY DEPT VISIT HI MDM: CPT | Performed by: EMERGENCY MEDICINE

## 2025-06-18 PROCEDURE — 85025 COMPLETE CBC W/AUTO DIFF WBC: CPT | Performed by: EMERGENCY MEDICINE

## 2025-06-18 PROCEDURE — 77001 FLUOROGUIDE FOR VEIN DEVICE: CPT | Performed by: RADIOLOGY

## 2025-06-18 PROCEDURE — 77001 FLUOROGUIDE FOR VEIN DEVICE: CPT

## 2025-06-18 PROCEDURE — 36415 COLL VENOUS BLD VENIPUNCTURE: CPT | Performed by: EMERGENCY MEDICINE

## 2025-06-18 PROCEDURE — 36581 REPLACE TUNNELED CV CATH: CPT

## 2025-06-18 PROCEDURE — 80048 BASIC METABOLIC PNL TOTAL CA: CPT | Performed by: EMERGENCY MEDICINE

## 2025-06-18 PROCEDURE — NC001 PR NO CHARGE: Performed by: RADIOLOGY

## 2025-06-18 PROCEDURE — 99284 EMERGENCY DEPT VISIT MOD MDM: CPT

## 2025-06-18 PROCEDURE — 85730 THROMBOPLASTIN TIME PARTIAL: CPT | Performed by: EMERGENCY MEDICINE

## 2025-06-18 PROCEDURE — 75901 REMOVE CVA DEVICE OBSTRUCT: CPT

## 2025-06-18 PROCEDURE — 85610 PROTHROMBIN TIME: CPT | Performed by: EMERGENCY MEDICINE

## 2025-06-18 PROCEDURE — C1757 CATH, THROMBECTOMY/EMBOLECT: HCPCS

## 2025-06-18 PROCEDURE — 36581 REPLACE TUNNELED CV CATH: CPT | Performed by: RADIOLOGY

## 2025-06-18 RX ADMIN — Medication 10 ML: at 10:18

## 2025-06-18 NOTE — ED PROVIDER NOTES
"Time reflects when diagnosis was documented in both MDM as applicable and the Disposition within this note       Time User Action Codes Description Comment    6/18/2025  7:01 AM Jf Garcia Add [T82.9XXA] Complication associated with dialysis catheter     6/18/2025  7:01 AM Jf Garcia Add [N18.6,  Z99.2] ESRD (end stage renal disease) on dialysis ([facility])           ED Disposition       ED Disposition   Discharge    Condition   Stable    Date/Time   Wed Jun 18, 2025 10:42 AM    Comment   Joaquin Frankel discharge to home/self care.                   Assessment & Plan       Medical Decision Making  Patient with history as below presented to triage with CC: \"Patient presents with:  Medical Problem: Patient was sent in from the dialysis center, his HD catheter is pulling out. Gets HD M,W,Thu,Fri.   \"  Hx obtained from pt    45M hx of ESRD, DM2 on MWTF dialysis. His dialysis catheter pulled out past the dialysis center's threshold so could not get dialysis done today. Last dialysis was Monday. Otherwise asymptomatic. Hemodynamically stable. Have contacted IR for catheter exchange, and will contact [facility]/nephro for consideration of inpatient HD as he missed today's session.    Differential diagnosis includes but is not limited to catheter dislodgment/migration versus catheter fracture with embolization versus catheter malposition versus retained fragments either calcific sheath versus external kinking versus catheter occlusion/thrombosis.    Plan: Labs, x-ray to confirm current placement, IR consult, ± nephro/[facility] consult    I have independently ordered, reviewed and interpreted the following: labs and/or imaging and/or EKG studies listed below  Reviewed external records including notes, and prior labs/imaging results.    Disposition: Patient condition, stable.  Discussed need for admission with patient for further management and workup with pt and they are agreeable with plan. Discussed case with [facility] hospitalist, who " agreed to admit patient to obs status.     See ED Course for further MDM.      PLEASE NOTE:  This encounter was completed utilizing the [employer] Direct Speech Voice Recognition Software. Grammatical errors, random word insertions, pronoun errors and incomplete sentences are occasional inherent consequences of the system due to software limitations, ambient noise and hardware issues.These may be missed by proof reading prior to affixing electronic signature. Any questions or concerns about the content, text or information contained within the body of this dictation should be directly addressed to the physician for clarification. Please do not hesitate to call me directly if you have any questions or concerns.      Amount and/or Complexity of Data Reviewed  External Data Reviewed: notes.  Labs: ordered. Decision-making details documented in ED Course.  Radiology: ordered and independent interpretation performed. Decision-making details documented in ED Course.    Risk  Decision regarding hospitalization.        ED Course as of 06/18/25 1048   Wed Jun 18, 2025   0649 Status post Uncomplicated tunneled dialysis catheter exchange and empiric fibrin sheath stripping on 5/6/2025.   0708 Discussed case with IR Dr. Arana, who will evaluate and consider for IR catheter exchange   0714 Discussed case with nephrology on-call, Dr. Spear.  States that if IR can get catheter exchange today, no need to admit for HD.  Patient is already scheduled for an extra HD session tomorrow.   0748 XR chest 1 view portable  Right tunneled dialysis catheter, tip difficult to see, projecting below the diaphragm, possibly in the intrahepatic IVC.   0817 PTT: 26   0817 POCT INR: 0.89   0852 Creatinine(!): 8.39   0852 Potassium: 4.1   1044 Patient had return from IR status post tunneled catheter exchange.  Extra HD session scheduled for tomorrow.  Appropriate for discharge.  Reviewed strict return precautions, along with PCP/nephro follow-up as  needed.       Medications   lidocaine-epinephrine 1%-1:[id number] buffered (10 mL Infiltration Given 6/18/25 1018)       ED Risk Strat Scores                    No data recorded                            History of Present Illness       Chief Complaint   Patient presents with    Medical Problem     Patient was sent in from the dialysis center, his HD catheter is pulling out. Gets HD M,W,Thu,Fri.        Past Medical History[1]   Past Surgical History[2]   Family History[3]   Social History[4]   E-Cigarette/Vaping    E-Cigarette Use Never User       E-Cigarette/Vaping Substances    Nicotine No     THC No     CBD No     Flavoring No     Other No     Unknown No       I have reviewed and agree with the history as documented.     45-year-old male with history of ESRD on MWF dialysis, DM2 presenting with pulling out of his right tunneled dialysis catheter.  States that he last had dialysis on Monday, felt that his dialysis catheter was pulling out then, the next day the stitch holding the catheter in place ripped, and is now dislodged to the point where his dialysis center will not proceed with dialysis until new catheter is placed.  He is due for dialysis today.  Denies symptoms otherwise.  Denies surrounding skin changes, catheter site pain, fever, chills, chest pain, shortness of breath.  Last catheter exchange was ~1 week ago, by [facility] per patient.        Review of Systems   Hematological:         Positive for dialysis catheter pulling out.   All other systems reviewed and are negative.          Objective       ED Triage Vitals [06/18/25 0628]   Temperature Pulse Blood Pressure Respirations SpO2 Patient Position - Orthostatic VS   98.9 °F (37.2 °C) 94 148/66 19 96 % Sitting      Temp Source Heart Rate Source BP Location FiO2 (%) Pain Score    Oral Monitor Right arm -- No Pain      Vitals      Date and Time Temp Pulse SpO2 Resp BP Pain Score FACES Pain Rating User   06/18/25 1034 -- 72 99 % -- -- -- -- PC    06/18/25 1030 -- 78 98 % -- -- -- -- PC   06/18/25 1025 -- 80 97 % -- -- -- -- PC   06/18/25 1020 -- 71 99 % -- -- -- -- PC   06/18/25 1015 -- 73 97 % -- -- -- -- PC   06/18/25 1004 -- 77 97 % -- -- -- --    06/18/25 0628 98.9 °F (37.2 °C) 94 96 % 19 148/66 No Pain -- KS            Physical Exam  Vitals and nursing note reviewed.   Constitutional:       General: He is not in acute distress.     Appearance: He is well-developed. He is obese. He is not toxic-appearing.   HENT:      Head: Normocephalic and atraumatic.      Right Ear: External ear normal.      Left Ear: External ear normal.      Nose: Nose normal. No congestion.      Mouth/Throat:      Mouth: Mucous membranes are moist.      Pharynx: Oropharynx is clear. No oropharyngeal exudate or posterior oropharyngeal erythema.     Eyes:      Extraocular Movements: Extraocular movements intact.      Conjunctiva/sclera: Conjunctivae normal.      Pupils: Pupils are equal, round, and reactive to light.       Cardiovascular:      Rate and Rhythm: Normal rate and regular rhythm.      Pulses: Normal pulses.      Heart sounds: Normal heart sounds. No murmur heard.     No friction rub. No gallop.   Pulmonary:      Effort: Pulmonary effort is normal. No respiratory distress.      Breath sounds: Normal breath sounds. No stridor. No wheezing, rhonchi or rales.   Chest:        Comments: Right dialysis catheter in place, pulled out past catheter cuff.  No surrounding cellulitic changes, tenderness to palpation or discharge.  Abdominal:      General: Bowel sounds are normal.      Palpations: Abdomen is soft.      Tenderness: There is no abdominal tenderness. There is no right CVA tenderness, left CVA tenderness, guarding or rebound.     Musculoskeletal:         General: No swelling, tenderness or deformity. Normal range of motion.      Cervical back: Normal range of motion and neck supple. No rigidity or tenderness.      Right lower leg: No edema.      Left lower leg: No  edema.   Lymphadenopathy:      Cervical: No cervical adenopathy.     Skin:     General: Skin is warm and dry.      Capillary Refill: Capillary refill takes less than 2 seconds.      Coloration: Skin is not jaundiced or pale.      Findings: No bruising, erythema, lesion or rash.     Neurological:      General: No focal deficit present.      Mental Status: He is alert and oriented to person, place, and time. Mental status is at baseline.      GCS: GCS eye subscore is 4. GCS verbal subscore is 5. GCS motor subscore is 6.      Cranial Nerves: Cranial nerves 2-12 are intact. No cranial nerve deficit, dysarthria or facial asymmetry.      Sensory: Sensation is intact. No sensory deficit.      Motor: Motor function is intact. No abnormal muscle tone or pronator drift.      Coordination: Coordination is intact.      Gait: Gait is intact.     Psychiatric:         Mood and Affect: Mood normal.         Behavior: Behavior normal.         Thought Content: Thought content normal.         Results Reviewed       Procedure Component Value Units Date/Time    Basic metabolic panel [[id number]]  (Abnormal) Collected: 06/18/25 0822    Lab Status: Final result Specimen: Blood from Arm, Right Updated: 06/18/25 0849     Sodium 135 mmol/L      Potassium 4.1 mmol/L      Chloride 98 mmol/L      CO2 25 mmol/L      ANION GAP 12 mmol/L      BUN 59 mg/dL      Creatinine 8.39 mg/dL      Glucose 170 mg/dL      Calcium 8.8 mg/dL      eGFR 6 ml/min/1.73sq m     Narrative:      National Kidney Disease Foundation guidelines for Chronic Kidney Disease (CKD):     Stage 1 with normal or high GFR (GFR > 90 mL/min/1.73 square meters)    Stage 2 Mild CKD (GFR = 60-89 mL/min/1.73 square meters)    Stage 3A Moderate CKD (GFR = 45-59 mL/min/1.73 square meters)    Stage 3B Moderate CKD (GFR = 30-44 mL/min/1.73 square meters)    Stage 4 Severe CKD (GFR = 15-29 mL/min/1.73 square meters)    Stage 5 End Stage CKD (GFR <15 mL/min/1.73 square meters)  Note: GFR  calculation is accurate only with a steady state creatinine    Protime-INR [[id number]]  (Normal) Collected: 06/18/25 0729    Lab Status: Final result Specimen: Blood from Arm, Right Updated: 06/18/25 0816     Protime 12.7 seconds      INR 0.89    Narrative:      INR Therapeutic Range    Indication                                             INR Range      Atrial Fibrillation                                               2.0-3.0  Hypercoagulable State                                    2.0.2.3  Left Ventricular Asist Device                            2.0-3.0  Mechanical Heart Valve                                  -    Aortic(with afib, MI, embolism, HF, LA enlargement,    and/or coagulopathy)                                     2.0-3.0 (2.5-3.5)     Mitral                                                             2.5-3.5  Prosthetic/Bioprosthetic Heart Valve               2.0-3.0  Venous thromboembolism (VTE: VT, PE        2.0-3.0    APTT [[id number]]  (Normal) Collected: 06/18/25 0729    Lab Status: Final result Specimen: Blood from Arm, Right Updated: 06/18/25 0816     PTT 26 seconds     CBC and differential [[id number]]  (Abnormal) Collected: 06/18/25 0729    Lab Status: Final result Specimen: Blood from Arm, Right Updated: 06/18/25 0735     WBC 10.97 Thousand/uL      RBC 2.92 Million/uL      Hemoglobin 9.5 g/dL      Hematocrit 28.4 %      MCV 97 fL      MCH 32.5 pg      MCHC 33.5 g/dL      RDW 14.6 %      MPV 8.8 fL      Platelets 263 Thousands/uL      nRBC 0 /100 WBCs      Segmented % 73 %      Immature Grans % 1 %      Lymphocytes % 14 %      Monocytes % 6 %      Eosinophils Relative 5 %      Basophils Relative 1 %      Absolute Neutrophils 8.08 Thousands/µL      Absolute Immature Grans 0.07 Thousand/uL      Absolute Lymphocytes 1.53 Thousands/µL      Absolute Monocytes 0.69 Thousand/µL      Eosinophils Absolute 0.54 Thousand/µL      Basophils Absolute 0.06 Thousands/µL             XR chest 1 view portable    Final Interpretation by Danyell Ford MD (06/18 5332)      No acute cardiopulmonary disease.      Right tunneled dialysis catheter, tip difficult to see, projecting below the diaphragm, possibly in the intrahepatic IVC.            Workstation performed: [id number]         IR tunneled dialysis catheter check/change/reposition/angioplasty    (Results Pending)       Procedures    ED Medication and Procedure Management   Prior to Admission Medications   Prescriptions Last Dose Informant Patient Reported? Taking?   Cholecalciferol (VITAMIN D3) 1,000 units tablet  Self No No   Sig: Take 2 tablets (2,000 Units total) by mouth daily   Semaglutide-Weight Management (Wegovy) 0.25 MG/0.5ML  Self Yes No   Sig: Inject 0.25 mg under the skin Once a week   Patient not taking: Reported on 5/19/2025   aspirin (ECOTRIN LOW STRENGTH) 81 mg EC tablet  Self No No   Sig: Take 1 tablet (81 mg total) by mouth daily   atorvastatin (LIPITOR) 80 mg tablet  Self No No   Sig: Take 1 tablet (80 mg total) by mouth daily   calcitriol (ROCALTROL) 0.25 mcg capsule  Self No No   Sig: Take 3 capsules (0.75 mcg total) by mouth 3 (three) times a week Mon, Wed, Fri   calcium acetate (PHOSLO) capsule  Self No No   Sig: Take 2 capsules (1,334 mg total) by mouth 3 (three) times a day   carvedilol (COREG) 6.25 mg tablet  Self No No   Sig: Take 0.5 tablets (3.125 mg total) by mouth 2 (two) times a day with meals   isosorbide mononitrate (IMDUR) 60 mg 24 hr tablet  Self No No   Sig: Take 1 tablet (60 mg total) by mouth daily   metolazone (ZAROXOLYN) 10 mg tablet  Self No No   Sig: Take 1 tablet (10 mg total) by mouth 2 (two) times a week On Monday, Thursday   Patient taking differently: Take 10 mg by mouth 2 (two) times a week On Saturday and Sunday   oxyCODONE-acetaminophen (Percocet) 5-325 mg per tablet   No No   Sig: Take 1 tablet by mouth every 6 (six) hours as needed for moderate pain Max Daily Amount: 4 tablets   sevelamer (RENAGEL) 800 mg tablet  " Self No No   Sig: Take 1 tablet (800 mg total) by mouth 3 (three) times a day with meals   Patient taking differently: Take 800 mg by mouth in the morning and 800 mg at noon and 800 mg in the evening. Take with meals. Takes only with snack as needed.   torsemide (DEMADEX) 100 mg tablet  Self No No   Sig: TAKE ONE TABLET BY MOUTH EVERY DAY (GENERIC FOR DEMADEX)      Facility-Administered Medications: None     Patient's Medications   Discharge Prescriptions    No medications on file     No discharge procedures on file.  ED SEPSIS DOCUMENTATION   Time reflects when diagnosis was documented in both MDM as applicable and the Disposition within this note       Time User Action Codes Description Comment    6/18/2025  7:01 AM Jf Garcia [T82.9XXA] Complication associated with dialysis catheter     6/18/2025  7:01 AM Jf Garcia [N18.6,  Z99.2] ESRD (end stage renal disease) on dialysis ([facility])                    [1]   Past Medical History:  Diagnosis Date    Acute hypoxic respiratory failure ([facility]) 10/07/2023    Arthritis     Breathing difficulty     Cellulitis 10/07/2023    Chronic kidney disease     end stage renal disease    Coronary artery disease     Diabetes mellitus ([facility])     Diabetic neuropathy ([facility]) 05/28/2021    Heart disease     CAD   s/p ptca with 1 stent 2012    Hidradenitis suppurativa     groin    History of transfusion     Hyperlipidemia     Hypertension     MI (myocardial infarction) ([facility])     \" silent \" M.I. in the past    Morbid obesity with BMI of 50.0-59.9, adult ([facility])     Myocardial infarction ([facility])     mini heart attack    Nicotine dependence     Obesity     PE (pulmonary thromboembolism) ([facility]) 06/2024    small on left side, takes coumadin    Pilonidal cyst     Type 1 non-ST elevation myocardial infarction (NSTEMI) ([facility]) 11/29/2020   [2]   Past Surgical History:  Procedure Laterality Date    CARDIAC SURGERY      CORONARY ANGIOPLASTY WITH STENT PLACEMENT      x2    INCISION AND DRAINAGE OF " WOUND N/A 01/24/2017    Procedure: INCISION AND DRAINAGE (I&D) BUTTOCK, PILONIDAL CYST;  Surgeon: Simba Adhikari MD;  Location: [facility];  Service:     INCISION AND DRAINAGE OF WOUND Right 4/30/2025    Procedure: INCISION AND DRAINAGE (I&D) GROIN;  Surgeon: Stef Mooney MD;  Location: [facility];  Service: General    IR BIOPSY BONE MARROW  06/12/2024    IR BIOPSY KIDNEY RANDOM  07/12/2024    IR BIOPSY KIDNEY RANDOM  07/19/2024    IR TEMPORARY DIALYSIS CATHETER PLACEMENT  06/06/2024    IR TUNNELED CENTRAL LINE PLACEMENT  06/14/2024    IR TUNNELED DIALYSIS CATHETER CHECK/CHANGE/REPOSITION/ANGIOPLASTY  1/2/2025    IR TUNNELED DIALYSIS CATHETER CHECK/CHANGE/REPOSITION/ANGIOPLASTY  2/5/2025    IR TUNNELED DIALYSIS CATHETER CHECK/CHANGE/REPOSITION/ANGIOPLASTY  5/6/2025    LEG SURGERY Left     I&D of left leg 2012. Had resp failure and had to be intubated during procedure.    DC ARTERIOVENOUS ANASTOMOSIS OPEN DIRECT Left 09/26/2024    Procedure: left arm radio-cephalic AVF creation;  Surgeon: Scout James DO;  Location: [facility];  Service: Vascular    DC ARTERIOVENOUS ANASTOMOSIS OPEN DIRECT Left 12/23/2024    Procedure: left arm brachio-basilic AVF creation;  Surgeon: Scout James DO;  Location: [facility];  Service: Vascular    DC ARTERIOVENOUS ANASTOMOSIS OPEN DIRECT Left 6/5/2025    Procedure: CREATION FISTULA ARTERIOVENOUS (AV) GRAFT;  Surgeon: Scout James DO;  [facility];  Service: Vascular    DC DEBRIDEMENT SUBCUTANEOUS TISSUE 1ST 20 SQ CM/< Left 07/06/2021    Procedure: DEBRIDEMENT WOUND (WASH OUT);  Surgeon: Sudheer Mcfarlane MD;  [facility]: [facility];  Service: General    DC EXC B9 LESION MRGN XCP SK TG T/A/L >4.0 CM Left 04/06/2021    Procedure: EXCISION THIGH MASS;  Surgeon: Sudheer Mcfarlane MD;  [facility];  Service: General    DC EXCISION H/P/P/U COMPLEX REPAIR Left 07/06/2021    Procedure: EXCISION PERINEAL ABSCESS LEFT THIGH;  Surgeon: Sudheer Mcfarlane MD;  [facility];  Service:  General    IL NEGATIVE PRESSURE WOUND THERAPY DME <= 50 SQ CM Left 2021    Procedure: APPLICATION VAC DRESSING THIGH;  Surgeon: Sudheer Mcfarlane MD;  [facility];  Service: General    IL REVJ OPN ARVEN FSTL W/O THRMBC DIAL GRF Left 3/6/2025    Procedure: left arm basilic fistula elevation;  Surgeon: Scout James DO;  [facility];  Service: Vascular    WOUND DEBRIDEMENT Left 2021    Procedure: DEBRIDEMENT WOUND AND DRESSING CHANGE (WASH OUT);  Surgeon: Mahin Traore DO;  [facility];  Service: General    WOUND DEBRIDEMENT Left 2021    Procedure: DEBRIDEMENT LOWER EXTREMITY (WASH OUT), left groin and thigh;  Surgeon: Sudheer Mcfarlane MD;  [facility];  Service: General    WOUND DEBRIDEMENT Left 2021    Procedure: DEBRIDEMENT LOWER EXTREMITY (WASH OUT);  Surgeon: Zak Castanon DO;  [facility];  Service: General    WOUND DEBRIDEMENT Left 2021    Procedure: Washout left thigh wound and closure;  Surgeon: Amor Jaramillo DO;  [facility];  Service: General   [3]   Family History  Problem Relation Name Age of Onset    Heart disease Father      Diabetes Father      Hypertension Mother      Heart disease Mother     [4]   Social History  Tobacco Use    Smoking status: Former     Current packs/day: 0.00     Average packs/day: 1.5 packs/day for 20.0 years (29.9 ttl pk-yrs)     Types: Cigarettes     Start date: 2021     Quit date: 2021     Years since quittin.3     Passive exposure: Past    Smokeless tobacco: Never   Vaping Use    Vaping status: Never Used   Substance Use Topics    Alcohol use: Not Currently    Drug use: Never        Jf Garcia DO  25 1048

## 2025-06-18 NOTE — DISCHARGE INSTRUCTIONS
Perma-cath Placement   WHAT YOU NEED TO KNOW:   A perma-cath is a catheter placed through a vein into or near your right atrium. Your right atrium is the right upper chamber of your heart. A perma-cath is used for dialysis in an emergency or until a long-term device is ready to use. After your procedure, you will have some pain and swelling on your chest and neck. You may have some bruises on your chest and neck. You may also have 2 dressings, one on your chest and one on your neck.   DISCHARGE INSTRUCTIONS:   Call 911 for any of the following:   You feel lightheaded, short of breath, and have chest pain.    Your catheter comes out   Contact Interventional Radiology at [phone] (ANDR[facility] PATIENTS: Contact Interventional Radiology at [phone]) ([facility] PATIENTS: Contact Interventional Radiology at [phone]) if:  Blood soaks through your bandage.   You have new swelling in your arm, neck, face, or chest on your right side.  Your catheter gets wet.    Your bruises or pain get worse.   You have a fever or chills.  Persistent nausea or vomiting.   Your incision is red, swollen, or draining pus.   You have questions or concerns about your condition or care.  Self-care:       Resume your normal diet.  Keep your dressings dry. Do not take a shower or swim. You may take a tub bath, but do not get your dressings wet. Water in your wound can cause bacteria to grow and cause an infection. If your dressing gets wet, dry it off and cover it with dry sterile gauze. Call your healthcare provider. Do not use soaps or ointments.  Do not change your dressings. Your healthcare provider or dialysis nurse will change your dressings. Your dressings should stay in place until your healthcare provider removes them. The dressing on your chest will stay as long as you have the catheter in place. The dressing prevents infection.    Do not remove the red and blue caps from the end of your catheter. The caps prevent air from getting  into your catheter.  Follow up with your healthcare provider as directed: Write down your questions so you remember to ask them during your visits    Please continue with dialysis, you have exercise and schedule for tomorrow.  Return if you have worsening trouble breathing, fevers, chest pain, dislodgment of catheter, blocking of catheter, or other concerning symptoms.

## 2025-06-18 NOTE — TELEMEDICINE
e-Consult ([facility])  - Interventional Radiology  Joaquin Frankel 45 y.o. male MRN: [id number]  Unit/Bed#: ED-37 Encounter: [id number]          Interventional Radiology has been consulted to evaluate Joaquin Frankel    What Procedure would you like? Tunneled catheter (for long-term iv access)   Clinical history and reason for the procedure request? ESRD, disloadged tunneled catheter   If pertinent, please specify laterality Right       Inpatient Consult to IR  Consult performed by: Saturnino Arana MD  Consult ordered by: Teto Middleton MD        06/18/25    Assessment/Recommendation:   Dislodged tdc    Replace tdc.  Plan to do today.     21-30 minutes, >50% of the total time devoted to medical consultative verbal/EMR discussion between providers. Written report will be generated in the EMR.     Thank you for allowing Interventional Radiology to participate in the care of Joaquin Frankel. Please don't hesitate to call or TigerText us with any questions.     Saturnino Arana MD

## 2025-06-18 NOTE — ED ATTENDING ATTESTATION
6/18/2025  ITeto MD, saw and evaluated the patient. I have discussed the patient with the resident/non-physician practitioner and agree with the resident's/non-physician practitioner's findings, Plan of Care, and MDM as documented in the resident's/non-physician practitioner's note, except where noted. All available labs and Radiology studies were reviewed.  I was present for key portions of any procedure(s) performed by the resident/non-physician practitioner and I was immediately available to provide assistance.       At this point I agree with the current assessment done in the Emergency Department.  I have conducted an independent evaluation of this patient a history and physical is as follows:      This is a 45-year-old male patient with a relevant past medical history of ESRD on dialysis Monday Wednesday Thursday Friday, CAD, PE not on any anticoagulation, presenting to the ED today for complaint of a dislodged dialysis catheter.  He has not tunneled PICC on the right side and states that his stitches recently became loose, and earlier this morning his catheter migrated approximately 1 cm out of his skin.  He went to dialysis earlier today, and at dialysis they refused to perform it for him due to the catheter sticking out of his skin.  Patient does not have any complaints at this time, no shortness of breath, no chest pain pressure discomfort, no peripheral edema, no other complaints.  His exam for the most part is unremarkable.  His catheter site is nonerythematous, without any discharge.  His differential diagnosis includes: Dislodged catheter versus worried well versus infection versus other.  Based on his physical exam it does appear that his catheter has migrated slightly out of his skin, and for that reason dialysis nurse will not perform dialysis.  We contacted IR, whom agreed to exchange his catheter today.  Patient had his catheter exchanged unremarkably today, and was discharged  "home.  He will get an extra dialysis session tomorrow.  The management plan was discussed in detail with the patient at bedside and all questions were answered. Strict ED return instructions were discussed at bedside. Prior to discharge, both verbal and written instructions were provided. We discussed the signs and symptoms that should prompt the patient to return to the ED. All questions were answered and the patient was comfortable with the plan of care and discharged home. The patient agrees to return to the Emergency Department for concerns and/or progression of illness.    Portions of the above record have been created with voice recognition software.  Occasional wrong word or \"sound alike\" substitutions may have occurred due to the inherent limitations of voice recognition software.  Read the chart carefully and recognize, using context, where substitutions may have occurred.    ED Course         Critical Care Time  Procedures      "

## 2025-06-20 ENCOUNTER — OFFICE VISIT (OUTPATIENT)
Dept: VASCULAR SURGERY | Facility: CLINIC | Age: 45
End: 2025-06-20

## 2025-06-20 VITALS — WEIGHT: 315 LBS | BODY MASS INDEX: 47.74 KG/M2 | HEART RATE: 99 BPM | HEIGHT: 68 IN

## 2025-06-20 DIAGNOSIS — N18.6 ESRD (END STAGE RENAL DISEASE) ON DIALYSIS (HCC): Primary | ICD-10-CM

## 2025-06-20 DIAGNOSIS — Z99.2 ESRD (END STAGE RENAL DISEASE) ON DIALYSIS (HCC): Primary | ICD-10-CM

## 2025-06-20 PROCEDURE — 99024 POSTOP FOLLOW-UP VISIT: CPT | Performed by: SURGERY

## 2025-06-20 RX ORDER — OXYCODONE AND ACETAMINOPHEN 5; 325 MG/1; MG/1
1 TABLET ORAL EVERY 6 HOURS PRN
Qty: 20 TABLET | Refills: 0 | Status: SHIPPED | OUTPATIENT
Start: 2025-06-20

## 2025-06-20 NOTE — H&P (VIEW-ONLY)
Name: Joaquin Frankel      : 1980      MRN: [id number]  Encounter Provider: Scout James DO  Encounter Date: 2025   Encounter department: [facility]  :  Assessment & Plan  ESRD (end stage renal disease) on dialysis (HCC)  Lab Results   Component Value Date    EGFR 6 2025    EGFR 6 2025    EGFR 4 2025    CREATININE 8.39 (H) 2025    CREATININE 9.13 (H) 2025    CREATININE 11.57 (H) 2025       45-year-old male presents status post left arm AV graft he is well-known to me has had multiple attempts access creation in the left upper extremity.  He had a left upper arm AV graft done 2 weeks ago on exam he does not have thrill appreciable his graft is occluded by physical exam.  He has a couple small areas where his incisions from his graft have not completely healed but does not appear to be infected.  His left hand is warm he has normal left upper extremity  strength.  At this point will refer him to interventional radiology for fistulogram and declot.  This will need to wait 2 weeks from this visit so that his graft is adequately incorporated for percutaneous access.  Based on the findings of the fistulogram would strongly consider anticoagulation as he has occluded multiple access these.  And he has a history of pulmonary embolus may have an underlying hypercoagulable disorder.  Will also give him additional prescription for Percocet as he has significant pain from the tunneling can follow-up with me as needed pending his fistulogram results.    Orders:    Ambulatory Referral to Interventional Radiology; Future    oxyCODONE-acetaminophen (Percocet) 5-325 mg per tablet; Take 1 tablet by mouth every 6 (six) hours as needed for moderate pain Max Daily Amount: 4 tablets    naloxone (NARCAN) 4 mg/0.1 mL nasal spray; Administer 1 spray into a nostril. If no response after 2-3 minutes, give another dose in the other nostril using a new  "spray.        History of Present Illness   HPI  Joaquin Frankel is a 45 y.o. male     Patient is s/p L AVG done 6/05/25 presents for post-op visit. Patient is currently on dialysis.   History obtained from: patient    Review of Systems   Constitutional: Negative.    HENT: Negative.     Eyes: Negative.    Respiratory: Negative.     Cardiovascular: Negative.    Gastrointestinal: Negative.    Endocrine: Negative.    Genitourinary: Negative.    Musculoskeletal: Negative.    Skin: Negative.    Allergic/Immunologic: Negative.    Neurological: Negative.    Hematological: Negative.    Psychiatric/Behavioral: Negative.       I have reviewed the ROS above and made changes as needed.         Objective   Pulse 99   Ht 5' 8\" (1.727 m)   Wt (!) 169 kg (372 lb)   BMI 56.56 kg/m²      Physical Exam        Upper Extremity:  Palpation:   left UE distal pulses non-palpable   Left hand warm, normal LUE  strength   Absent thrill in AVG   Upper and lower arm incisions will minor skin dehiscence        Neurologic  Exam:alert, non-focal, oriented x 3        Administrative Statements   I have spent a total time of 10 minutes in caring for this patient on the day of the visit/encounter including Counseling / Coordination of care, Documenting in the medical record, and Reviewing/placing orders in the medical record (including tests, medications, and/or procedures).  "

## 2025-06-20 NOTE — PROGRESS NOTES
Name: Joaquin Frankel      : 1980      MRN: [id number]  Encounter Provider: Scout James DO  Encounter Date: 2025   Encounter department: [facility]  :  Assessment & Plan  ESRD (end stage renal disease) on dialysis (HCC)  Lab Results   Component Value Date    EGFR 6 2025    EGFR 6 2025    EGFR 4 2025    CREATININE 8.39 (H) 2025    CREATININE 9.13 (H) 2025    CREATININE 11.57 (H) 2025       45-year-old male presents status post left arm AV graft he is well-known to me has had multiple attempts access creation in the left upper extremity.  He had a left upper arm AV graft done 2 weeks ago on exam he does not have thrill appreciable his graft is occluded by physical exam.  He has a couple small areas where his incisions from his graft have not completely healed but does not appear to be infected.  His left hand is warm he has normal left upper extremity  strength.  At this point will refer him to interventional radiology for fistulogram and declot.  This will need to wait 2 weeks from this visit so that his graft is adequately incorporated for percutaneous access.  Based on the findings of the fistulogram would strongly consider anticoagulation as he has occluded multiple access these.  And he has a history of pulmonary embolus may have an underlying hypercoagulable disorder.  Will also give him additional prescription for Percocet as he has significant pain from the tunneling can follow-up with me as needed pending his fistulogram results.    Orders:    Ambulatory Referral to Interventional Radiology; Future    oxyCODONE-acetaminophen (Percocet) 5-325 mg per tablet; Take 1 tablet by mouth every 6 (six) hours as needed for moderate pain Max Daily Amount: 4 tablets    naloxone (NARCAN) 4 mg/0.1 mL nasal spray; Administer 1 spray into a nostril. If no response after 2-3 minutes, give another dose in the other nostril using a new  "spray.        History of Present Illness   HPI  Joaquin Frankel is a 45 y.o. male     Patient is s/p L AVG done 6/05/25 presents for post-op visit. Patient is currently on dialysis.   History obtained from: patient    Review of Systems   Constitutional: Negative.    HENT: Negative.     Eyes: Negative.    Respiratory: Negative.     Cardiovascular: Negative.    Gastrointestinal: Negative.    Endocrine: Negative.    Genitourinary: Negative.    Musculoskeletal: Negative.    Skin: Negative.    Allergic/Immunologic: Negative.    Neurological: Negative.    Hematological: Negative.    Psychiatric/Behavioral: Negative.       I have reviewed the ROS above and made changes as needed.         Objective   Pulse 99   Ht 5' 8\" (1.727 m)   Wt (!) 169 kg (372 lb)   BMI 56.56 kg/m²      Physical Exam        Upper Extremity:  Palpation:   left UE distal pulses non-palpable   Left hand warm, normal LUE  strength   Absent thrill in AVG   Upper and lower arm incisions will minor skin dehiscence        Neurologic  Exam:alert, non-focal, oriented x 3        Administrative Statements   I have spent a total time of 10 minutes in caring for this patient on the day of the visit/encounter including Counseling / Coordination of care, Documenting in the medical record, and Reviewing/placing orders in the medical record (including tests, medications, and/or procedures).  "

## 2025-06-20 NOTE — ASSESSMENT & PLAN NOTE
Lab Results   Component Value Date    EGFR 6 06/18/2025    EGFR 6 05/06/2025    EGFR 4 05/05/2025    CREATININE 8.39 (H) 06/18/2025    CREATININE 9.13 (H) 05/06/2025    CREATININE 11.57 (H) 05/05/2025       45-year-old male presents status post left arm AV graft he is well-known to me has had multiple attempts access creation in the left upper extremity.  He had a left upper arm AV graft done 2 weeks ago on exam he does not have thrill appreciable his graft is occluded by physical exam.  He has a couple small areas where his incisions from his graft have not completely healed but does not appear to be infected.  His left hand is warm he has normal left upper extremity  strength.  At this point will refer him to interventional radiology for fistulogram and declot.  This will need to wait 2 weeks from this visit so that his graft is adequately incorporated for percutaneous access.  Based on the findings of the fistulogram would strongly consider anticoagulation as he has occluded multiple access these.  And he has a history of pulmonary embolus may have an underlying hypercoagulable disorder.  Will also give him additional prescription for Percocet as he has significant pain from the tunneling can follow-up with me as needed pending his fistulogram results.    Orders:    Ambulatory Referral to Interventional Radiology; Future    oxyCODONE-acetaminophen (Percocet) 5-325 mg per tablet; Take 1 tablet by mouth every 6 (six) hours as needed for moderate pain Max Daily Amount: 4 tablets    naloxone (NARCAN) 4 mg/0.1 mL nasal spray; Administer 1 spray into a nostril. If no response after 2-3 minutes, give another dose in the other nostril using a new spray.

## 2025-06-23 ENCOUNTER — PREP FOR PROCEDURE (OUTPATIENT)
Dept: INTERVENTIONAL RADIOLOGY/VASCULAR | Facility: CLINIC | Age: 45
End: 2025-06-23

## 2025-06-23 DIAGNOSIS — N18.6 ESRD (END STAGE RENAL DISEASE) ON DIALYSIS (HCC): Primary | ICD-10-CM

## 2025-06-23 DIAGNOSIS — Z99.2 ESRD (END STAGE RENAL DISEASE) ON DIALYSIS (HCC): Primary | ICD-10-CM

## 2025-06-23 RX ORDER — SODIUM CHLORIDE 9 MG/ML
50 INJECTION, SOLUTION INTRAVENOUS CONTINUOUS
OUTPATIENT
Start: 2025-06-23

## 2025-06-25 DIAGNOSIS — T82.49XA CLOTTED DIALYSIS ACCESS (HCC): ICD-10-CM

## 2025-06-25 DIAGNOSIS — N18.6 ESRD (END STAGE RENAL DISEASE) ON DIALYSIS (HCC): Primary | ICD-10-CM

## 2025-06-25 DIAGNOSIS — Z99.2 ESRD (END STAGE RENAL DISEASE) ON DIALYSIS (HCC): Primary | ICD-10-CM

## 2025-06-30 ENCOUNTER — TELEPHONE (OUTPATIENT)
Dept: RADIOLOGY | Facility: HOSPITAL | Age: 45
End: 2025-06-30

## 2025-06-30 NOTE — PRE-PROCEDURE INSTRUCTIONS
Pre-procedure Instructions for Interventional Radiology  [address]   [id number]  INTERVENTIONAL RADIOLOGY [phone]    You are scheduled for a/an graftogram.    On Wednesday 7-9-25.    Your arrival time is 7:30am.  Our Interventional Radiology nurse will notify you a few days before your procedure with the exact arrival time and location to arrive.    To prepare for your procedure:  Please arrange for someone to drive you home after the procedure and stay with you until the next morning if you are instructed to do so.  This is typically for patients receiving some type of sedative or anesthetic for the procedure.  DO NOT EAT OR DRINK ANYTHING after midnight on the evening before your procedure including candy & gum.  ONLY SIPS OF WATER with your medications are allowed on the morning of your procedure.  TAKE ALL OF YOUR REGULAR MEDICATIONS THE MORNING OF YOUR PROCEDURE with sips of water!  We may call you to stop some of your blood sugar, blood pressure and blood thinning medications depending on the procedure.  Please take all of these medications unless we instruct you to stop them.  If you have an allergy to x-ray dye, please contact Interventional Radiology for an x-ray dye preparation which usually consists of an oral steroid and Benadryl.  If you wear a Glucose Monitor, you may be asked to remove it for your procedure if we are using x-ray.  These devices need to be removed when we are imaging with x-ray near the device since the radiation can cause the unit to malfunction.  If possible and not too inconvenient, you may want to schedule your exam closer to day 14 of your 14 day device so your device is not wasted.    The day of your procedure:  Bring a list of the medications you take at home.  Bring medications you take for breathing problems (such as inhalers), medications for chest pain, or both.  Bring your insurance card and a form of photo ID.  Bring a case for  your glasses or contacts.  Please leave all valuables such as credit cards and jewelry at home.  Report to the registration desk in the main lobby at the [facility].  Ask to be directed to the After Procedure Unit on the 2nd floor.  While your procedure is being performed your family may wait in the Waiting Room on the 2nd floor.  Be prepared to stay overnight just in case. Sometimes procedures will indicate the need for further observation or treatment.   If you are scheduled for a follow-up visit with the Interventional Radiologist after your procedure, you will be called with a date and time.    Special Instructions (Medications to alter or stop taking before your procedure etc.):  Hold Demadex the morning of the procedure.

## 2025-07-02 ENCOUNTER — TRANSCRIBE ORDERS (OUTPATIENT)
Dept: RADIOLOGY | Facility: HOSPITAL | Age: 45
End: 2025-07-02

## 2025-07-02 ENCOUNTER — HOSPITAL ENCOUNTER (OUTPATIENT)
Dept: NON INVASIVE DIAGNOSTICS | Facility: HOSPITAL | Age: 45
Discharge: HOME | End: 2025-07-02
Attending: RADIOLOGY
Payer: COMMERCIAL

## 2025-07-02 VITALS
HEART RATE: 81 BPM | OXYGEN SATURATION: 95 % | SYSTOLIC BLOOD PRESSURE: 136 MMHG | DIASTOLIC BLOOD PRESSURE: 80 MMHG | RESPIRATION RATE: 22 BRPM

## 2025-07-02 DIAGNOSIS — N18.6 ESRD (END STAGE RENAL DISEASE) ON DIALYSIS (HCC): ICD-10-CM

## 2025-07-02 DIAGNOSIS — Z99.2 ESRD (END STAGE RENAL DISEASE) ON DIALYSIS (HCC): ICD-10-CM

## 2025-07-02 DIAGNOSIS — Z99.2 ESRD (END STAGE RENAL DISEASE) ON DIALYSIS (HCC): Primary | ICD-10-CM

## 2025-07-02 DIAGNOSIS — N18.6 ESRD (END STAGE RENAL DISEASE) ON DIALYSIS (HCC): Primary | ICD-10-CM

## 2025-07-02 DIAGNOSIS — N18.9 CHRONIC KIDNEY DISEASE, UNSPECIFIED CKD STAGE: Primary | ICD-10-CM

## 2025-07-02 PROCEDURE — C1750 CATH, HEMODIALYSIS,LONG-TERM: HCPCS

## 2025-07-02 PROCEDURE — 77001 FLUOROGUIDE FOR VEIN DEVICE: CPT | Performed by: RADIOLOGY

## 2025-07-02 PROCEDURE — 36581 REPLACE TUNNELED CV CATH: CPT

## 2025-07-02 PROCEDURE — 77001 FLUOROGUIDE FOR VEIN DEVICE: CPT

## 2025-07-02 PROCEDURE — 36581 REPLACE TUNNELED CV CATH: CPT | Performed by: RADIOLOGY

## 2025-07-02 RX ORDER — LIDOCAINE WITH 8.4% SOD BICARB 0.9%(10ML)
SYRINGE (ML) INJECTION AS NEEDED
Status: COMPLETED | OUTPATIENT
Start: 2025-07-02 | End: 2025-07-02

## 2025-07-02 RX ADMIN — Medication 10 ML: at 14:05

## 2025-07-02 RX ADMIN — IOHEXOL 20 ML: 350 INJECTION, SOLUTION INTRAVENOUS at 14:46

## 2025-07-02 NOTE — DISCHARGE INSTRUCTIONS
Perma-cath Placement   WHAT YOU NEED TO KNOW:   A perma-cath is a catheter placed through a vein into or near your right atrium. Your right atrium is the right upper chamber of your heart. A perma-cath is used for dialysis in an emergency or until a long-term device is ready to use. After your procedure, you will have some pain and swelling on your chest and neck. You may have some bruises on your chest and neck. You may also have 2 dressings, one on your chest and one on your neck.   DISCHARGE INSTRUCTIONS:   Call 911 for any of the following:   You feel lightheaded, short of breath, and have chest pain.    Your catheter comes out   Contact Interventional Radiology at [phone] ([facility] PATIENTS: Contact Interventional Radiology at [phone]) ([facility] PATIENTS: Contact Interventional Radiology at [phone]) if:  Blood soaks through your bandage.   You have new swelling in your arm, neck, face, or chest on your right side.  Your catheter gets wet.    Your bruises or pain get worse.   You have a fever or chills.  Persistent nausea or vomiting.   Your incision is red, swollen, or draining pus.   You have questions or concerns about your condition or care.  Self-care:       Resume your normal diet.  Keep your dressings dry. Do not take a shower or swim. You may take a tub bath, but do not get your dressings wet. Water in your wound can cause bacteria to grow and cause an infection. If your dressing gets wet, dry it off and cover it with dry sterile gauze. Call your healthcare provider. Do not use soaps or ointments.  Do not change your dressings. Your healthcare provider or dialysis nurse will change your dressings. Your dressings should stay in place until your healthcare provider removes them. The dressing on your chest will stay as long as you have the catheter in place. The dressing prevents infection.    Do not remove the red and blue caps from the end of your catheter. The caps prevent air from getting  into your catheter.  Follow up with your healthcare provider as directed: Write down your questions so you remember to ask them during your visits.

## 2025-07-03 ENCOUNTER — APPOINTMENT (INPATIENT)
Dept: RADIOLOGY | Facility: HOSPITAL | Age: 45
End: 2025-07-03
Attending: STUDENT IN AN ORGANIZED HEALTH CARE EDUCATION/TRAINING PROGRAM
Payer: COMMERCIAL

## 2025-07-03 ENCOUNTER — APPOINTMENT (INPATIENT)
Dept: DIALYSIS | Facility: HOSPITAL | Age: 45
End: 2025-07-03
Payer: COMMERCIAL

## 2025-07-03 ENCOUNTER — HOSPITAL ENCOUNTER (INPATIENT)
Facility: HOSPITAL | Age: 45
LOS: 1 days | Discharge: HOME | End: 2025-07-04
Attending: EMERGENCY MEDICINE | Admitting: INTERNAL MEDICINE
Payer: COMMERCIAL

## 2025-07-03 ENCOUNTER — APPOINTMENT (INPATIENT)
Dept: RADIOLOGY | Facility: HOSPITAL | Age: 45
End: 2025-07-03
Payer: COMMERCIAL

## 2025-07-03 DIAGNOSIS — Z99.2 ESRD (END STAGE RENAL DISEASE) ON DIALYSIS (HCC): ICD-10-CM

## 2025-07-03 DIAGNOSIS — N18.6 ESRD (END STAGE RENAL DISEASE) ON DIALYSIS (HCC): ICD-10-CM

## 2025-07-03 DIAGNOSIS — T82.41XA HEMODIALYSIS CATHETER DYSFUNCTION, INITIAL ENCOUNTER (HCC): ICD-10-CM

## 2025-07-03 DIAGNOSIS — T82.9XXA COMPLICATION OF VASCULAR DIALYSIS CATHETER: ICD-10-CM

## 2025-07-03 DIAGNOSIS — E87.70 VOLUME OVERLOAD: ICD-10-CM

## 2025-07-03 DIAGNOSIS — T82.49XA CLOTTED DIALYSIS ACCESS, INITIAL ENCOUNTER (HCC): ICD-10-CM

## 2025-07-03 DIAGNOSIS — T82.9XXA COMPLICATION OF VASCULAR DIALYSIS CATHETER, INITIAL ENCOUNTER: Primary | ICD-10-CM

## 2025-07-03 DIAGNOSIS — T82.49XA CLOTTED DIALYSIS ACCESS (HCC): ICD-10-CM

## 2025-07-03 DIAGNOSIS — Z99.2 DIALYSIS PATIENT (HCC): Primary | ICD-10-CM

## 2025-07-03 PROBLEM — D63.1 ANEMIA IN ESRD (END-STAGE RENAL DISEASE)  (HCC): Status: ACTIVE | Noted: 2025-07-03

## 2025-07-03 PROBLEM — M89.9 CHRONIC KIDNEY DISEASE-MINERAL BONE DISORDER (CKD-MBD) WITH STAGE 5 CHRONIC KIDNEY DISEASE, ON CHRONIC DIALYSIS (HCC): Status: ACTIVE | Noted: 2025-07-03

## 2025-07-03 PROBLEM — E83.9 CHRONIC KIDNEY DISEASE-MINERAL BONE DISORDER (CKD-MBD) WITH STAGE 5 CHRONIC KIDNEY DISEASE, ON CHRONIC DIALYSIS (HCC): Status: ACTIVE | Noted: 2025-07-03

## 2025-07-03 PROBLEM — N18.5 CHRONIC KIDNEY DISEASE-MINERAL BONE DISORDER (CKD-MBD) WITH STAGE 5 CHRONIC KIDNEY DISEASE, ON CHRONIC DIALYSIS (HCC): Status: ACTIVE | Noted: 2025-07-03

## 2025-07-03 PROBLEM — Z86.711 HISTORY OF PULMONARY EMBOLISM: Status: ACTIVE | Noted: 2025-07-03

## 2025-07-03 LAB
ANION GAP SERPL CALCULATED.3IONS-SCNC: 13 MMOL/L (ref 4–13)
APTT PPP: 26 SECONDS (ref 23–34)
APTT PPP: 26 SECONDS (ref 23–34)
BASOPHILS # BLD AUTO: 0.04 THOUSANDS/ÂΜL (ref 0–0.1)
BASOPHILS NFR BLD AUTO: 1 % (ref 0–1)
BUN SERPL-MCNC: 67 MG/DL (ref 5–25)
CALCIUM SERPL-MCNC: 8.3 MG/DL (ref 8.4–10.2)
CHLORIDE SERPL-SCNC: 97 MMOL/L (ref 96–108)
CO2 SERPL-SCNC: 26 MMOL/L (ref 21–32)
CREAT SERPL-MCNC: 9.42 MG/DL (ref 0.6–1.3)
EOSINOPHIL # BLD AUTO: 0.49 THOUSAND/ÂΜL (ref 0–0.61)
EOSINOPHIL NFR BLD AUTO: 6 % (ref 0–6)
ERYTHROCYTE [DISTWIDTH] IN BLOOD BY AUTOMATED COUNT: 14.3 % (ref 11.6–15.1)
ERYTHROCYTE [DISTWIDTH] IN BLOOD BY AUTOMATED COUNT: 14.4 % (ref 11.6–15.1)
GFR SERPL CREATININE-BSD FRML MDRD: 6 ML/MIN/1.73SQ M
GLUCOSE SERPL-MCNC: 161 MG/DL (ref 65–140)
GLUCOSE SERPL-MCNC: 162 MG/DL (ref 65–140)
GLUCOSE SERPL-MCNC: 174 MG/DL (ref 65–140)
HCT VFR BLD AUTO: 27.1 % (ref 36.5–49.3)
HCT VFR BLD AUTO: 28.2 % (ref 36.5–49.3)
HGB BLD-MCNC: 9.3 G/DL (ref 12–17)
HGB BLD-MCNC: 9.5 G/DL (ref 12–17)
IMM GRANULOCYTES # BLD AUTO: 0.08 THOUSAND/UL (ref 0–0.2)
IMM GRANULOCYTES NFR BLD AUTO: 1 % (ref 0–2)
INR PPP: 0.89 (ref 0.85–1.19)
INR PPP: 0.89 (ref 0.85–1.19)
LYMPHOCYTES # BLD AUTO: 1.44 THOUSANDS/ÂΜL (ref 0.6–4.47)
LYMPHOCYTES NFR BLD AUTO: 17 % (ref 14–44)
MCH RBC QN AUTO: 32.2 PG (ref 26.8–34.3)
MCH RBC QN AUTO: 33 PG (ref 26.8–34.3)
MCHC RBC AUTO-ENTMCNC: 33.7 G/DL (ref 31.4–37.4)
MCHC RBC AUTO-ENTMCNC: 34.3 G/DL (ref 31.4–37.4)
MCV RBC AUTO: 96 FL (ref 82–98)
MCV RBC AUTO: 96 FL (ref 82–98)
MONOCYTES # BLD AUTO: 0.62 THOUSAND/ÂΜL (ref 0.17–1.22)
MONOCYTES NFR BLD AUTO: 7 % (ref 4–12)
NEUTROPHILS # BLD AUTO: 6 THOUSANDS/ÂΜL (ref 1.85–7.62)
NEUTS SEG NFR BLD AUTO: 68 % (ref 43–75)
NRBC BLD AUTO-RTO: 0 /100 WBCS
PLATELET # BLD AUTO: 234 THOUSANDS/UL (ref 149–390)
PLATELET # BLD AUTO: 266 THOUSANDS/UL (ref 149–390)
PMV BLD AUTO: 9.1 FL (ref 8.9–12.7)
PMV BLD AUTO: 9.2 FL (ref 8.9–12.7)
POTASSIUM SERPL-SCNC: 4.2 MMOL/L (ref 3.5–5.3)
PROTHROMBIN TIME: 12.7 SECONDS (ref 12.3–15)
PROTHROMBIN TIME: 12.7 SECONDS (ref 12.3–15)
RBC # BLD AUTO: 2.82 MILLION/UL (ref 3.88–5.62)
RBC # BLD AUTO: 2.95 MILLION/UL (ref 3.88–5.62)
SODIUM SERPL-SCNC: 136 MMOL/L (ref 135–147)
WBC # BLD AUTO: 8.39 THOUSAND/UL (ref 4.31–10.16)
WBC # BLD AUTO: 8.67 THOUSAND/UL (ref 4.31–10.16)

## 2025-07-03 PROCEDURE — 85025 COMPLETE CBC W/AUTO DIFF WBC: CPT | Performed by: EMERGENCY MEDICINE

## 2025-07-03 PROCEDURE — 99153 MOD SED SAME PHYS/QHP EA: CPT

## 2025-07-03 PROCEDURE — 99285 EMERGENCY DEPT VISIT HI MDM: CPT | Performed by: EMERGENCY MEDICINE

## 2025-07-03 PROCEDURE — 99284 EMERGENCY DEPT VISIT MOD MDM: CPT

## 2025-07-03 PROCEDURE — 85027 COMPLETE CBC AUTOMATED: CPT | Performed by: INTERNAL MEDICINE

## 2025-07-03 PROCEDURE — 77001 FLUOROGUIDE FOR VEIN DEVICE: CPT | Performed by: STUDENT IN AN ORGANIZED HEALTH CARE EDUCATION/TRAINING PROGRAM

## 2025-07-03 PROCEDURE — 82948 REAGENT STRIP/BLOOD GLUCOSE: CPT

## 2025-07-03 PROCEDURE — 85610 PROTHROMBIN TIME: CPT | Performed by: INTERNAL MEDICINE

## 2025-07-03 PROCEDURE — 36581 REPLACE TUNNELED CV CATH: CPT | Performed by: STUDENT IN AN ORGANIZED HEALTH CARE EDUCATION/TRAINING PROGRAM

## 2025-07-03 PROCEDURE — 99214 OFFICE O/P EST MOD 30 MIN: CPT | Performed by: INTERNAL MEDICINE

## 2025-07-03 PROCEDURE — 85730 THROMBOPLASTIN TIME PARTIAL: CPT | Performed by: INTERNAL MEDICINE

## 2025-07-03 PROCEDURE — 85730 THROMBOPLASTIN TIME PARTIAL: CPT | Performed by: EMERGENCY MEDICINE

## 2025-07-03 PROCEDURE — 99152 MOD SED SAME PHYS/QHP 5/>YRS: CPT

## 2025-07-03 PROCEDURE — C1769 GUIDE WIRE: HCPCS

## 2025-07-03 PROCEDURE — 71045 X-RAY EXAM CHEST 1 VIEW: CPT

## 2025-07-03 PROCEDURE — C1750 CATH, HEMODIALYSIS,LONG-TERM: HCPCS

## 2025-07-03 PROCEDURE — NC001 PR NO CHARGE: Performed by: STUDENT IN AN ORGANIZED HEALTH CARE EDUCATION/TRAINING PROGRAM

## 2025-07-03 PROCEDURE — 36598 INJ W/FLUOR EVAL CV DEVICE: CPT

## 2025-07-03 PROCEDURE — 77001 FLUOROGUIDE FOR VEIN DEVICE: CPT

## 2025-07-03 PROCEDURE — 99152 MOD SED SAME PHYS/QHP 5/>YRS: CPT | Performed by: STUDENT IN AN ORGANIZED HEALTH CARE EDUCATION/TRAINING PROGRAM

## 2025-07-03 PROCEDURE — 99223 1ST HOSP IP/OBS HIGH 75: CPT | Performed by: INTERNAL MEDICINE

## 2025-07-03 PROCEDURE — 36415 COLL VENOUS BLD VENIPUNCTURE: CPT | Performed by: EMERGENCY MEDICINE

## 2025-07-03 PROCEDURE — 5A1D70Z PERFORMANCE OF URINARY FILTRATION, INTERMITTENT, LESS THAN 6 HOURS PER DAY: ICD-10-PCS | Performed by: HOSPITALIST

## 2025-07-03 PROCEDURE — 80048 BASIC METABOLIC PNL TOTAL CA: CPT | Performed by: EMERGENCY MEDICINE

## 2025-07-03 PROCEDURE — 36581 REPLACE TUNNELED CV CATH: CPT

## 2025-07-03 PROCEDURE — 85610 PROTHROMBIN TIME: CPT | Performed by: EMERGENCY MEDICINE

## 2025-07-03 RX ORDER — INSULIN LISPRO 100 [IU]/ML
2-12 INJECTION, SOLUTION INTRAVENOUS; SUBCUTANEOUS
Status: DISCONTINUED | OUTPATIENT
Start: 2025-07-03 | End: 2025-07-04 | Stop reason: HOSPADM

## 2025-07-03 RX ORDER — CALCITRIOL 0.25 UG/1
0.75 CAPSULE, LIQUID FILLED ORAL 3 TIMES WEEKLY
Status: DISCONTINUED | OUTPATIENT
Start: 2025-07-04 | End: 2025-07-04 | Stop reason: HOSPADM

## 2025-07-03 RX ORDER — ACETAMINOPHEN 325 MG/1
650 TABLET ORAL EVERY 6 HOURS PRN
Status: DISCONTINUED | OUTPATIENT
Start: 2025-07-03 | End: 2025-07-04 | Stop reason: HOSPADM

## 2025-07-03 RX ORDER — ASPIRIN 81 MG/1
81 TABLET ORAL DAILY
Status: DISCONTINUED | OUTPATIENT
Start: 2025-07-03 | End: 2025-07-04 | Stop reason: HOSPADM

## 2025-07-03 RX ORDER — CALCIUM ACETATE 667 MG/1
1334 CAPSULE ORAL 3 TIMES DAILY
Status: DISCONTINUED | OUTPATIENT
Start: 2025-07-03 | End: 2025-07-04 | Stop reason: HOSPADM

## 2025-07-03 RX ORDER — HEPARIN SODIUM 1000 [USP'U]/ML
10000 INJECTION, SOLUTION INTRAVENOUS; SUBCUTANEOUS ONCE
Status: COMPLETED | OUTPATIENT
Start: 2025-07-03 | End: 2025-07-03

## 2025-07-03 RX ORDER — SEVELAMER HYDROCHLORIDE 800 MG/1
800 TABLET, FILM COATED ORAL
Status: DISCONTINUED | OUTPATIENT
Start: 2025-07-03 | End: 2025-07-04 | Stop reason: HOSPADM

## 2025-07-03 RX ORDER — CARVEDILOL 3.12 MG/1
3.12 TABLET ORAL 2 TIMES DAILY WITH MEALS
Status: DISCONTINUED | OUTPATIENT
Start: 2025-07-03 | End: 2025-07-04 | Stop reason: HOSPADM

## 2025-07-03 RX ORDER — METOLAZONE 5 MG/1
10 TABLET ORAL 2 TIMES WEEKLY
Status: DISCONTINUED | OUTPATIENT
Start: 2025-07-03 | End: 2025-07-04 | Stop reason: HOSPADM

## 2025-07-03 RX ORDER — ISOSORBIDE MONONITRATE 60 MG/1
60 TABLET, EXTENDED RELEASE ORAL DAILY
Status: DISCONTINUED | OUTPATIENT
Start: 2025-07-03 | End: 2025-07-04 | Stop reason: HOSPADM

## 2025-07-03 RX ORDER — TORSEMIDE 100 MG/1
100 TABLET ORAL DAILY
Status: DISCONTINUED | OUTPATIENT
Start: 2025-07-03 | End: 2025-07-04 | Stop reason: HOSPADM

## 2025-07-03 RX ORDER — ATORVASTATIN CALCIUM 40 MG/1
80 TABLET, FILM COATED ORAL
Status: DISCONTINUED | OUTPATIENT
Start: 2025-07-03 | End: 2025-07-04 | Stop reason: HOSPADM

## 2025-07-03 RX ORDER — HEPARIN SODIUM 10000 [USP'U]/100ML
3-30 INJECTION, SOLUTION INTRAVENOUS
Status: DISPENSED | OUTPATIENT
Start: 2025-07-03 | End: 2025-07-04

## 2025-07-03 RX ORDER — OXYCODONE AND ACETAMINOPHEN 5; 325 MG/1; MG/1
1 TABLET ORAL EVERY 6 HOURS PRN
Status: DISCONTINUED | OUTPATIENT
Start: 2025-07-03 | End: 2025-07-04 | Stop reason: HOSPADM

## 2025-07-03 RX ORDER — MIDAZOLAM HYDROCHLORIDE 2 MG/2ML
INJECTION, SOLUTION INTRAMUSCULAR; INTRAVENOUS AS NEEDED
Status: COMPLETED | OUTPATIENT
Start: 2025-07-03 | End: 2025-07-03

## 2025-07-03 RX ORDER — HYDROMORPHONE HCL/PF 1 MG/ML
0.5 SYRINGE (ML) INJECTION EVERY 4 HOURS PRN
Status: DISCONTINUED | OUTPATIENT
Start: 2025-07-03 | End: 2025-07-04 | Stop reason: HOSPADM

## 2025-07-03 RX ORDER — FENTANYL CITRATE 50 UG/ML
INJECTION, SOLUTION INTRAMUSCULAR; INTRAVENOUS AS NEEDED
Status: COMPLETED | OUTPATIENT
Start: 2025-07-03 | End: 2025-07-03

## 2025-07-03 RX ADMIN — MIDAZOLAM 1 MG: 1 INJECTION INTRAMUSCULAR; INTRAVENOUS at 14:51

## 2025-07-03 RX ADMIN — HYDROMORPHONE HYDROCHLORIDE 0.5 MG: 1 INJECTION, SOLUTION INTRAMUSCULAR; INTRAVENOUS; SUBCUTANEOUS at 19:18

## 2025-07-03 RX ADMIN — MIDAZOLAM 0.5 MG: 1 INJECTION INTRAMUSCULAR; INTRAVENOUS at 15:01

## 2025-07-03 RX ADMIN — OXYCODONE HYDROCHLORIDE AND ACETAMINOPHEN 1 TABLET: 5; 325 TABLET ORAL at 12:45

## 2025-07-03 RX ADMIN — FENTANYL CITRATE 50 MCG: 50 INJECTION INTRAMUSCULAR; INTRAVENOUS at 14:51

## 2025-07-03 RX ADMIN — OXYCODONE HYDROCHLORIDE AND ACETAMINOPHEN 1 TABLET: 5; 325 TABLET ORAL at 20:18

## 2025-07-03 RX ADMIN — HEPARIN SODIUM 18 UNITS/KG/HR: 10000 INJECTION, SOLUTION INTRAVENOUS at 22:13

## 2025-07-03 RX ADMIN — INSULIN LISPRO 2 UNITS: 100 INJECTION, SOLUTION INTRAVENOUS; SUBCUTANEOUS at 19:14

## 2025-07-03 RX ADMIN — CALCIUM ACETATE 1334 MG: 667 CAPSULE ORAL at 20:18

## 2025-07-03 RX ADMIN — SEVELAMER HYDROCHLORIDE 800 MG: 800 TABLET ORAL at 20:18

## 2025-07-03 RX ADMIN — HEPARIN SODIUM 10000 UNITS: 1000 INJECTION, SOLUTION INTRAVENOUS; SUBCUTANEOUS at 21:07

## 2025-07-03 RX ADMIN — HYDROMORPHONE HYDROCHLORIDE 0.5 MG: 1 INJECTION, SOLUTION INTRAMUSCULAR; INTRAVENOUS; SUBCUTANEOUS at 23:43

## 2025-07-03 RX ADMIN — FENTANYL CITRATE 25 MCG: 50 INJECTION INTRAMUSCULAR; INTRAVENOUS at 15:01

## 2025-07-03 RX ADMIN — TORSEMIDE 100 MG: 100 TABLET ORAL at 13:59

## 2025-07-03 RX ADMIN — IOHEXOL 50 ML: 350 INJECTION, SOLUTION INTRAVENOUS at 15:37

## 2025-07-03 RX ADMIN — Medication 2000 UNITS: at 14:00

## 2025-07-03 RX ADMIN — MIDAZOLAM 1 MG: 1 INJECTION INTRAMUSCULAR; INTRAVENOUS at 14:36

## 2025-07-03 RX ADMIN — FENTANYL CITRATE 50 MCG: 50 INJECTION INTRAMUSCULAR; INTRAVENOUS at 14:37

## 2025-07-03 RX ADMIN — ISOSORBIDE MONONITRATE 60 MG: 60 TABLET, EXTENDED RELEASE ORAL at 13:59

## 2025-07-03 RX ADMIN — VANCOMYCIN HYDROCHLORIDE 2000 MG: 5 INJECTION, POWDER, LYOPHILIZED, FOR SOLUTION INTRAVENOUS at 16:37

## 2025-07-03 RX ADMIN — HYDROMORPHONE HYDROCHLORIDE 0.5 MG: 1 INJECTION, SOLUTION INTRAMUSCULAR; INTRAVENOUS; SUBCUTANEOUS at 11:31

## 2025-07-03 NOTE — PROGRESS NOTES
Patient with ESRD on HD MWF (currently dialyzing MWTF due to chronic volume overload) and thrombosed LUE AVG who presents to the patient dialysis center this a.m. with nonfunctioning tunneled HD catheter.  Patient with ongoing issues with recurrent HD catheter malfunction, thrombosis requiring multiple catheter exchanges with most recent catheter exchange yesterday, 7/2/2025.  Unable to perform dialysis today due to nonfunctioning HD catheter.  Last HD treatment 6/30/2025.  Currently 9 kg above EDW.  Discussed with Dr. Holbrook.  Patient sent to ER at [facility] for evaluation and likely admission and HD after functioning access obtained.  Will need IR evaluation for new tunneled HD catheter.  LUE AVG thrombectomy was planned as an outpatient on 7/9/2025.

## 2025-07-03 NOTE — ED PROVIDER NOTES
Time reflects when diagnosis was documented in both MDM as applicable and the Disposition within this note       Time User Action Codes Description Comment    7/3/2025 10:04 AM Lauryn Hobbs Add [Z99.2] Dialysis patient ([facility])     7/3/2025 10:04 AM ClydekalinLauryn Morales A Add [N18.6,  Z99.2] ESRD (end stage renal disease) on dialysis ([facility])     7/3/2025 10:04 AM Lauryn Hobbs A Add [E87.70] Volume overload     7/3/2025 10:04 AM Lauryn Hobbs Add [T82.41XA] Hemodialysis catheter dysfunction, initial encounter ([facility])     7/3/2025 10:27 AM Lauryn Hobbs Add [T82.9XXA] Complication of vascular dialysis catheter           ED Disposition       ED Disposition   Admit    Condition   Stable    Date/Time   Thu Jul 3, 2025 10:27 AM    Comment   Case was discussed with JHOAN and the patient's admission status was agreed to be Admission Status: inpatient status to the service of Dr. TALLEY .               Assessment & Plan       Medical Decision Making  Joaquin has ESRD and over the last couple of months recurrent issues with HD access.  Right IJ catheter placed yesterday is not appropriately functioning for today's dialysis session and he has been sent in for further evaluation/treatment.  He is hemodynamically stable although volume overloaded.  Fortunately not hyperkalemic.  Interventional radiology will evaluate him today and if unable to obtain function in current catheter arrange for alternate site to be used.    There is concern he may have coagulopathy given history of PE and recent dysfunction of sites and thrombosis in AV graft placed on 6/5.  He had not yet initiated anticoagulation as outpatient.  Interventional radiologist  does not advise initiation at this moment.  He/his team will evaluate Joaquin first.    Nephrology CRLOKI Hester has evaluated patient and is aware that IR will see him today.    Excepted by Dr. Caballero to Jhoan service.      Amount and/or  "Complexity of Data Reviewed  Labs: ordered.    Risk  Decision regarding hospitalization.        ED Course as of 07/03/25 1100   Thu Jul 03, 2025   1009 Labs pending to assess for possible hyperkalemia.    Have reached out to interventional radiology regarding patient presence and need for access as well as nephrology to inform them of his arrival.    Given holiday weekend await response regarding plan for IR evaluation/treatment prior to contacting [facility].    Fortunately he is hemodynamically stable at this time despite volume overload.   1011 No leukocytosis or leukopenia.  Hgb stable.   1050 Fortunately potassium is not elevated.  Accepted to [facility].  Comfortable at this time.       Medications - No data to display    ED Risk Strat Scores                    No data recorded        SBIRT 20yo+      Flowsheet Row Most Recent Value   Initial Alcohol Screen: US AUDIT-C     1. How often do you have a drink containing alcohol? 0 Filed at: 07/03/2025 0929   2. How many drinks containing alcohol do you have on a typical day you are drinking?  0 Filed at: 07/03/2025 0929   3a. Male UNDER 65: How often do you have five or more drinks on one occasion? 0 Filed at: 07/03/2025 0929   3b. FEMALE Any Age, or MALE 65+: How often do you have 4 or more drinks on one occassion? 0 Filed at: 07/03/2025 0929   Audit-C Score 0 Filed at: 07/03/2025 0929   ALEX: How many times in the past year have you...    Used an illegal drug or used a prescription medication for non-medical reasons? Never Filed at: 07/03/2025 0929                            History of Present Illness       Chief Complaint   Patient presents with    Vascular Access Problem     Per pre-arrival note: \"ESRD pt on HD MWF (with extra treatment every Thursday) with thrombosed LUE AVG and recurrent tunneled permcath dysfunction despite multiple catheter exchanges. S/p catheter exchange 7/2/25 but now unable to flow at outpatient dialysis this am. Unable to perform any " "substantial dialysis today. +chronic issues with volume overload, now recurrent due to missed HD. Last treatment 6/30. Dr Kendall recommending pt sent to ER for evaluation, IR evaluation and need to HD.\"       Past Medical History[1]   Past Surgical History[2]   Family History[3]   Social History[4]   E-Cigarette/Vaping    E-Cigarette Use Never User       E-Cigarette/Vaping Substances    Nicotine No     THC No     CBD No     Flavoring No     Other No     Unknown No       I have reviewed and agree with the history as documented.     Joaquin is a 45-year-old male with ESRD on HD T, hypertension, type 2 diabetes and CAD who presents to the emergency department having been referred in from dialysis today with malfunctioning venous access (right IJ catheter placed yesterday).  Over the last couple of months he has had recurrent thrombosis of sites.  He notes that multiple attempts were made yesterday until this catheter had been functioning.  He does have left upper extremity AV graft placed on 6/5.  At 6/20 vascular follow-up appointment no thrill was appreciated.  Vascular note indicated that 2 more weeks would be needed for maturity until fistulogram and thrombectomy could be performed.  This is scheduled for 7/9.  Over the last few weeks dialysis schedule has been disrupted due to malfunctioning catheters.  He had previously been receiving this regularly on Mondays, Wednesdays, Thursdays and Fridays.  Today one of the catheters flushed although no fluid could be withdrawn off of this.  He was unable to undergo any dialysis yesterday (Wednesday) and had his last treatment on Monday (3 days ago).  He does appreciate extra volume in his chest and notes that weight is up approximately 10 kg.  Nephrology note from this morning indicates that he is up 9 kg from baseline and that he was being referred in with anticipated IR consult for vascular access and admission for dialysis.    Fortunately he has not had chest discomfort, " dyspnea, fevers, nausea or vomiting.    He does have history of prior PE and had completed course of anticoagulation for this.  Concern for possible underlying coagulopathy given recurrent thromboses.  Apixaban was prescribed on 6/25.  Patient notes that this just arrived at his pharmacy.  It has not yet been started.        Review of Systems   All other systems reviewed and are negative.          Objective       ED Triage Vitals [07/03/25 0929]   Temperature Pulse Blood Pressure Respirations SpO2 Patient Position - Orthostatic VS   98.4 °F (36.9 °C) 75 (!) 192/81 20 99 % Sitting      Temp Source Heart Rate Source BP Location FiO2 (%) Pain Score    Oral Monitor Right arm -- --      Vitals      Date and Time Temp Pulse SpO2 Resp BP Pain Score FACES Pain Rating User   07/03/25 0929 98.4 °F (36.9 °C) 75 99 % 20 192/81 -- -- LR            Physical Exam  Vitals and nursing note reviewed.   HENT:      Head: Normocephalic.      Mouth/Throat:      Mouth: Mucous membranes are moist.     Eyes:      General: No scleral icterus.      Cardiovascular:      Rate and Rhythm: Normal rate and regular rhythm.      Heart sounds: Normal heart sounds.   Pulmonary:      Effort: Pulmonary effort is normal.      Breath sounds: Normal breath sounds. No rales.   Chest:      Chest wall: Tenderness (Mild tenderness around insertion site of right IJ catheter.  Translucent dressing in place.  No discharge or erythema appreciated.) present.     Musculoskeletal:      Comments: Fully close skin incisions left upper extremity (AV graft).  No lower arm swelling or discoloration.     Skin:     General: Skin is warm and dry.     Neurological:      Mental Status: He is alert.     Psychiatric:         Mood and Affect: Mood normal.         Results Reviewed       Procedure Component Value Units Date/Time    Basic metabolic panel [[id number]]  (Abnormal) Collected: 07/03/25 0996    Lab Status: Final result Specimen: Blood from Arm, Right Updated: 07/03/25  1023     Sodium 136 mmol/L      Potassium 4.2 mmol/L      Chloride 97 mmol/L      CO2 26 mmol/L      ANION GAP 13 mmol/L      BUN 67 mg/dL      Creatinine 9.42 mg/dL      Glucose 174 mg/dL      Calcium 8.3 mg/dL      eGFR 6 ml/min/1.73sq m     Narrative:      National Kidney Disease Foundation guidelines for Chronic Kidney Disease (CKD):     Stage 1 with normal or high GFR (GFR > 90 mL/min/1.73 square meters)    Stage 2 Mild CKD (GFR = 60-89 mL/min/1.73 square meters)    Stage 3A Moderate CKD (GFR = 45-59 mL/min/1.73 square meters)    Stage 3B Moderate CKD (GFR = 30-44 mL/min/1.73 square meters)    Stage 4 Severe CKD (GFR = 15-29 mL/min/1.73 square meters)    Stage 5 End Stage CKD (GFR <15 mL/min/1.73 square meters)  Note: GFR calculation is accurate only with a steady state creatinine    Protime-INR [[id number]]  (Normal) Collected: 07/03/25 0947    Lab Status: Final result Specimen: Blood from Arm, Right Updated: 07/03/25 1021     Protime 12.7 seconds      INR 0.89    Narrative:      INR Therapeutic Range    Indication                                             INR Range      Atrial Fibrillation                                               2.0-3.0  Hypercoagulable State                                    2.0.2.3  Left Ventricular Asist Device                            2.0-3.0  Mechanical Heart Valve                                  -    Aortic(with afib, MI, embolism, HF, LA enlargement,    and/or coagulopathy)                                     2.0-3.0 (2.5-3.5)     Mitral                                                             2.5-3.5  Prosthetic/Bioprosthetic Heart Valve               2.0-3.0  Venous thromboembolism (VTE: VT, PE        2.0-3.0    APTT [[id number]]  (Normal) Collected: 07/03/25 0947    Lab Status: Final result Specimen: Blood from Arm, Right Updated: 07/03/25 1021     PTT 26 seconds     CBC and differential [[id number]]  (Abnormal) Collected: 07/03/25 0947    Lab Status: Final result  Specimen: Blood from Arm, Right Updated: 07/03/25 1010     WBC 8.67 Thousand/uL      RBC 2.95 Million/uL      Hemoglobin 9.5 g/dL      Hematocrit 28.2 %      MCV 96 fL      MCH 32.2 pg      MCHC 33.7 g/dL      RDW 14.4 %      MPV 9.1 fL      Platelets 266 Thousands/uL      nRBC 0 /100 WBCs      Segmented % 68 %      Immature Grans % 1 %      Lymphocytes % 17 %      Monocytes % 7 %      Eosinophils Relative 6 %      Basophils Relative 1 %      Absolute Neutrophils 6.00 Thousands/µL      Absolute Immature Grans 0.08 Thousand/uL      Absolute Lymphocytes 1.44 Thousands/µL      Absolute Monocytes 0.62 Thousand/µL      Eosinophils Absolute 0.49 Thousand/µL      Basophils Absolute 0.04 Thousands/µL             No orders to display       Procedures    ED Medication and Procedure Management   Prior to Admission Medications   Prescriptions Last Dose Informant Patient Reported? Taking?   Cholecalciferol (VITAMIN D3) 1,000 units tablet  Self No No   Sig: Take 2 tablets (2,000 Units total) by mouth daily   Semaglutide-Weight Management (Wegovy) 0.25 MG/0.5ML  Self Yes No   Sig: Inject 0.25 mg under the skin Once a week   Patient not taking: Reported on 5/19/2025   apixaban (Eliquis) 5 mg   No No   Sig: Take 1 tablet (5 mg total) by mouth 2 (two) times a day   aspirin (ECOTRIN LOW STRENGTH) 81 mg EC tablet  Self No No   Sig: Take 1 tablet (81 mg total) by mouth daily   atorvastatin (LIPITOR) 80 mg tablet  Self No No   Sig: Take 1 tablet (80 mg total) by mouth daily   calcitriol (ROCALTROL) 0.25 mcg capsule  Self No No   Sig: Take 3 capsules (0.75 mcg total) by mouth 3 (three) times a week Mon, Wed, Fri   calcium acetate (PHOSLO) capsule  Self No No   Sig: Take 2 capsules (1,334 mg total) by mouth 3 (three) times a day   carvedilol (COREG) 6.25 mg tablet  Self No No   Sig: Take 0.5 tablets (3.125 mg total) by mouth 2 (two) times a day with meals   isosorbide mononitrate (IMDUR) 60 mg 24 hr tablet  Self No No   Sig: Take 1 tablet  (60 mg total) by mouth daily   metolazone (ZAROXOLYN) 10 mg tablet  Self No No   Sig: Take 1 tablet (10 mg total) by mouth 2 (two) times a week On Monday, Thursday   naloxone (NARCAN) 4 mg/0.1 mL nasal spray   No No   Sig: Administer 1 spray into a nostril. If no response after 2-3 minutes, give another dose in the other nostril using a new spray.   oxyCODONE-acetaminophen (Percocet) 5-325 mg per tablet  Self No No   Sig: Take 1 tablet by mouth every 6 (six) hours as needed for moderate pain Max Daily Amount: 4 tablets   Patient not taking: Reported on 6/20/2025   oxyCODONE-acetaminophen (Percocet) 5-325 mg per tablet   No No   Sig: Take 1 tablet by mouth every 6 (six) hours as needed for moderate pain Max Daily Amount: 4 tablets   sevelamer (RENAGEL) 800 mg tablet  Self No No   Sig: Take 1 tablet (800 mg total) by mouth 3 (three) times a day with meals   torsemide (DEMADEX) 100 mg tablet  Self No No   Sig: TAKE ONE TABLET BY MOUTH EVERY DAY (GENERIC FOR DEMADEX)      Facility-Administered Medications: None     Patient's Medications   Discharge Prescriptions    No medications on file     No discharge procedures on file.  ED SEPSIS DOCUMENTATION   Time reflects when diagnosis was documented in both MDM as applicable and the Disposition within this note       Time User Action Codes Description Comment    7/3/2025 10:04 AM Lauryn Hobbs Add [Z99.2] Dialysis patient ([facility])     7/3/2025 10:04 AM Lauryn Hobbs Add [N18.6,  Z99.2] ESRD (end stage renal disease) on dialysis ([facility])     7/3/2025 10:04 AM Lauryn Hobbs Add [E87.70] Volume overload     7/3/2025 10:04 AM Lauryn Hobbs Add [T82.41XA] Hemodialysis catheter dysfunction, initial encounter ([facility])     7/3/2025 10:27 AM Lauryn Hobbs Add [T82.9XXA] Complication of vascular dialysis catheter                    [1]   Past Medical History:  Diagnosis Date    Acute hypoxic respiratory failure ([facility])  "10/07/2023    Arthritis     Breathing difficulty     Cellulitis 10/07/2023    Chronic kidney disease     end stage renal disease    Coronary artery disease     Diabetes mellitus (HCC)     Diabetic neuropathy ([facility]) 05/28/2021    Heart disease     CAD   s/p ptca with 1 stent 2012    Hidradenitis suppurativa     groin    History of transfusion     Hyperlipidemia     Hypertension     MI (myocardial infarction) ([facility])     \" silent \" M.I. in the past    Morbid obesity with BMI of 50.0-59.9, adult ([facility])     Myocardial infarction ([facility])     mini heart attack    Nicotine dependence     Obesity     PE (pulmonary thromboembolism) ([facility]) 06/2024    small on left side, takes coumadin    Pilonidal cyst     Type 1 non-ST elevation myocardial infarction (NSTEMI) ([facility]) 11/29/2020   [2]   Past Surgical History:  Procedure Laterality Date    CARDIAC SURGERY      CORONARY ANGIOPLASTY WITH STENT PLACEMENT      x2    INCISION AND DRAINAGE OF WOUND N/A 01/24/2017    Procedure: INCISION AND DRAINAGE (I&D) BUTTOCK, PILONIDAL CYST;  Surgeon: Simba Adhikari MD;  Location: [facility];  Service:     INCISION AND DRAINAGE OF WOUND Right 4/30/2025    Procedure: INCISION AND DRAINAGE (I&D) GROIN;  Surgeon: Stef oMoney MD;  Location: [facility];  Service: General    IR BIOPSY BONE MARROW  06/12/2024    IR BIOPSY KIDNEY RANDOM  07/12/2024    IR BIOPSY KIDNEY RANDOM  07/19/2024    IR TEMPORARY DIALYSIS CATHETER PLACEMENT  06/06/2024    IR TUNNELED CENTRAL LINE PLACEMENT  06/14/2024    IR TUNNELED DIALYSIS CATHETER CHECK/CHANGE/REPOSITION/ANGIOPLASTY  1/2/2025    IR TUNNELED DIALYSIS CATHETER CHECK/CHANGE/REPOSITION/ANGIOPLASTY  2/5/2025    IR TUNNELED DIALYSIS CATHETER CHECK/CHANGE/REPOSITION/ANGIOPLASTY  5/6/2025    IR TUNNELED DIALYSIS CATHETER CHECK/CHANGE/REPOSITION/ANGIOPLASTY  6/18/2025    IR TUNNELED DIALYSIS CATHETER CHECK/CHANGE/REPOSITION/ANGIOPLASTY  7/2/2025    LEG SURGERY Left     I&D of left leg 2012. Had resp failure and had to be " intubated during procedure.    AK ARTERIOVENOUS ANASTOMOSIS OPEN DIRECT Left 09/26/2024    Procedure: left arm radio-cephalic AVF creation;  Surgeon: Scout James DO;  Location: [facility];  Service: Vascular    AK ARTERIOVENOUS ANASTOMOSIS OPEN DIRECT Left 12/23/2024    Procedure: left arm brachio-basilic AVF creation;  Surgeon: Scout James DO;  Location: [facility];  Service: Vascular    AK ARTERIOVENOUS ANASTOMOSIS OPEN DIRECT Left 6/5/2025    Procedure: CREATION FISTULA ARTERIOVENOUS (AV) GRAFT;  Surgeon: Scout James DO;  [facility]: [facility];  Service: Vascular    AK DEBRIDEMENT SUBCUTANEOUS TISSUE 1ST 20 SQ CM/< Left 07/06/2021    Procedure: DEBRIDEMENT WOUND (WASH OUT);  Surgeon: Sudheer Mcfarlane MD;  [facility]: [facility];  Service: General    AK EXC B9 LESION MRGN XCP SK TG T/A/L >4.0 CM Left 04/06/2021    Procedure: EXCISION THIGH MASS;  Surgeon: Sudheer Mcfarlane MD;  [facility];  Service: General    AK EXCISION H/P/P/U COMPLEX REPAIR Left 07/06/2021    Procedure: EXCISION PERINEAL ABSCESS LEFT THIGH;  Surgeon: Sudheer Mcfarlane MD;  [facility];  Service: General    AK NEGATIVE PRESSURE WOUND THERAPY DME <= 50 SQ CM Left 04/06/2021    Procedure: APPLICATION VAC DRESSING THIGH;  Surgeon: Sudheer Mcfarlane MD;  [facility];  Service: General    AK REVJ OPN ARVEN FSTL W/O THRMBC DIAL GRF Left 3/6/2025    Procedure: left arm basilic fistula elevation;  Surgeon: Scout James DO;  [facility];  Service: Vascular    WOUND DEBRIDEMENT Left 04/17/2021    Procedure: DEBRIDEMENT WOUND AND DRESSING CHANGE (WASH OUT);  Surgeon: Mahin Traore DO;  [facility];  Service: General    WOUND DEBRIDEMENT Left 04/22/2021    Procedure: DEBRIDEMENT LOWER EXTREMITY (WASH OUT), left groin and thigh;  Surgeon: Sudheer Mcfarlane MD;  [facility];  Service: General    WOUND DEBRIDEMENT Left 05/26/2021    Procedure: DEBRIDEMENT LOWER EXTREMITY (WASH OUT);  Surgeon: Zak Castanon DO;   [facility];  Service: General    WOUND DEBRIDEMENT Left 2021    Procedure: Washout left thigh wound and closure;  Surgeon: Amor Jaramillo DO;  [facility];  Service: General   [3]   Family History  Problem Relation Name Age of Onset    Heart disease Father      Diabetes Father      Hypertension Mother      Heart disease Mother     [4]   Social History  Tobacco Use    Smoking status: Former     Current packs/day: 0.00     Average packs/day: 1.5 packs/day for 20.0 years (29.9 ttl pk-yrs)     Types: Cigarettes     Start date: 2021     Quit date: 2021     Years since quittin.3     Passive exposure: Past    Smokeless tobacco: Never   Vaping Use    Vaping status: Never Used   Substance Use Topics    Alcohol use: Not Currently    Drug use: Never        Lauryn Hobbs MD  25 [phone]

## 2025-07-03 NOTE — ASSESSMENT & PLAN NOTE
Lab Results   Component Value Date    EGFR 6 07/03/2025    EGFR 6 06/18/2025    EGFR 6 05/06/2025    CREATININE 9.42 (H) 07/03/2025    CREATININE 8.39 (H) 06/18/2025    CREATININE 9.13 (H) 05/06/2025   -- In center hemodialysis, Monday Wednesday Friday at [facility] with an extra dialysis treatment on Thursdays due to chronic volume overload  -- kg  --Access: Left arm AV graft, and nonfunctioning tunneled HD catheter.  Ongoing issues with recurrent HD catheter malfunction and thrombosis requiring multiple catheter exchanges with most recent catheter exchange on July 2.  Unable to perform dialysis at the outpatient unit due to access issues.  --IR for assistance with the access.  May potentially require new tunneled catheter  --Holding anticoagulation at this time.  Patient was scheduled for thrombectomy on July 9 for the clotted AV graft  --Patient is volume overloaded and approximately 9 kg above dry weight  -- Plan for dialysis this afternoon once access issues resolved and plan again for dialysis tomorrow  -- Discussed with outpatient HD unit, and the ER physician

## 2025-07-03 NOTE — ASSESSMENT & PLAN NOTE
Thrombosed LUE AV graft and now with nonfunctioning tunneled HD catheter. Having ongoing issues with recurrent HD catheter malfunction and thrombosis requiring multiple catheter exchanges most recently 7/2/2025.  Unable to access HD catheter today sent to ED from dialysis  Suspect coagulopathy contributing to recurrent thrombosis.  He has Eliquis ordered as outpatient to start.  Will start Eliquis after IR intervention

## 2025-07-03 NOTE — CONSULTS
NEPHROLOGY HOSPITAL CONSULTATION   Joaquin Frankel 45 y.o. male MRN: [id number]  Unit/Bed#: ED-22 Encounter: [id number]    Brief History of Admission -45-year-old male with a history of end-stage renal disease who presents to the hospital for nonworking access.  Nephrology consult for ESRD management.    Assessment & Plan  Primary hypertension  -- Blood pressure is high, volume mediated  --Plan for ultrafiltration with dialysis once access is able to be used  --Can continue home antihypertensive agent  ESRD (end stage renal disease) on dialysis ([facility])  Lab Results   Component Value Date    EGFR 6 07/03/2025    EGFR 6 06/18/2025    EGFR 6 05/06/2025    CREATININE 9.42 (H) 07/03/2025    CREATININE 8.39 (H) 06/18/2025    CREATININE 9.13 (H) 05/06/2025   -- In center hemodialysis, Monday Wednesday Friday at [facility] with an extra dialysis treatment on Thursdays due to chronic volume overload  -- kg  --Access: Left arm AV graft, and nonfunctioning tunneled HD catheter.  Ongoing issues with recurrent HD catheter malfunction and thrombosis requiring multiple catheter exchanges with most recent catheter exchange on July 2.  Unable to perform dialysis at the outpatient unit due to access issues.  --IR for assistance with the access.  May potentially require new tunneled catheter  --Holding anticoagulation at this time.  Patient was scheduled for thrombectomy on July 9 for the clotted AV graft  --Patient is volume overloaded and approximately 9 kg above dry weight  -- Plan for dialysis this afternoon once access issues resolved and plan again for dialysis tomorrow  -- Discussed with outpatient HD unit, and the ER physician    Anemia in ESRD (end-stage renal disease)  ([facility])  Lab Results   Component Value Date    EGFR 6 07/03/2025    EGFR 6 06/18/2025    EGFR 6 05/06/2025    CREATININE 9.42 (H) 07/03/2025    CREATININE 8.39 (H) 06/18/2025    CREATININE 9.13 (H) 05/06/2025   -- Hemoglobin stable at 9.5,  stable  --Not on JOSSELYN due to history of pulmonary embolism  Clotted dialysis access (HCC)  -- IR has been consulted  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    HISTORY OF PRESENT ILLNESS:  Requesting Physician: Lauryn Pena*  Reason for Consult: ESRD with clotted access    Joaquin Frankel is a 45 y.o. male who was admitted to [facility] after presenting with clotted permacath. A renal consultation is requested today for assistance in the management of ESRD.  Patient has an issue with clotted permacath for some time and also has a clotted AV graft plan for thrombectomy next week.  Not currently taking Eliquis for the clotted access he also has a history of pulmonary embolism.  He is reporting no worsening chest pain or worsening shortness of breath.  Due to chronic volume overload he gets dialysis 4 times a week which I suspect is due to probably access issues as well.    PAST MEDICAL HISTORY:  Past Medical History[1]    PAST SURGICAL HISTORY:  Past Surgical History[2]    ALLERGIES:  Allergies[3]    SOCIAL HISTORY:  Social History     Substance and Sexual Activity   Alcohol Use Not Currently     Social History     Substance and Sexual Activity   Drug Use Never     Tobacco Use History[4]    FAMILY HISTORY:  Family History[5]    MEDICATIONS:  Current Medications[6]    REVIEW OF SYSTEMS:  Constitutional: Negative for fatigue,  anorexia, fever, chills, diaphoresis  HENT: Negative for postnasal drip  Eyes: Negative for visual disturbance.   Respiratory: Negative for cough, shortness of breath and wheezing.   Cardiovascular: Negative for chest pain, palpitations and leg swelling.   Gastrointestinal: Negative for abdominal pain, constipation, diarrhea, nausea and vomiting.   Genitourinary: No dysuria, hematuria  Endocrine: Negative for polyuria.   Musculoskeletal: Negative for arthralgias, back pain and joint swelling.   Skin: Negative for rash.   Neurological: Negative for focal weakness, headaches, dizziness.  Hematological: Negative for easy bruising or bleeding.  Psychiatric/Behavioral: Negative for confusion and sleep disturbance.   All the systems were reviewed and were negative except as documented on the HPI.    PHYSICAL EXAM:  Current Weight:    First Weight:    Vitals:    07/03/25 0929   BP: (!) 192/81   BP Location: Right arm   Pulse: 75   Resp: 20   Temp: 98.4 °F (36.9 °C)   TempSrc: Oral   SpO2: 99%     No intake or output data in the 24 hours ending 07/03/25 1029  Physical Exam  Constitutional:       General: He is not in acute distress.     Appearance: He is well-developed. He is obese.   HENT:      Head: Normocephalic and atraumatic.     Eyes:      General: No scleral icterus.     Conjunctiva/sclera: Conjunctivae normal.      Pupils: Pupils are equal, round, and reactive to light.       Cardiovascular:      Rate and Rhythm: Normal rate and regular rhythm.      Heart sounds: S1 normal and S2 normal. No murmur heard.     No friction rub. No gallop.   Pulmonary:      Effort: Pulmonary effort is normal. No respiratory distress.      Breath sounds: Normal breath sounds. No wheezing or rales.   Abdominal:      General: Bowel sounds are normal.      Palpations: Abdomen is soft.      Tenderness: There is no abdominal tenderness. There is no rebound.     Musculoskeletal:         General: Normal range of motion.      Cervical back:  "Normal range of motion and neck supple.      Right lower leg: Edema present.      Left lower leg: Edema present.     Skin:     Findings: No rash.     Neurological:      Mental Status: He is alert and oriented to person, place, and time.     Psychiatric:         Behavior: Behavior normal.           Invasive Devices:      Lab Results:   Results from last 7 days   Lab Units 07/03/25  0947   WBC Thousand/uL 8.67   HEMOGLOBIN g/dL 9.5*   HEMATOCRIT % 28.2*   PLATELETS Thousands/uL 266   POTASSIUM mmol/L 4.2   CHLORIDE mmol/L 97   CO2 mmol/L 26   BUN mg/dL 67*   CREATININE mg/dL 9.42*   CALCIUM mg/dL 8.3*         Portions of the record may have been created with voice recognition software. Occasional wrong word or \"sound a like\" substitutions may have occurred due to the inherent limitations of voice recognition software. Read the chart carefully and recognize, using context, where substitutions have occurred.If you have any questions, please contact the dictating provider.         [1]   Past Medical History:  Diagnosis Date    Acute hypoxic respiratory failure ([facility]) 10/07/2023    Arthritis     Breathing difficulty     Cellulitis 10/07/2023    Chronic kidney disease     end stage renal disease    Coronary artery disease     Diabetes mellitus ([facility])     Diabetic neuropathy ([facility]) 05/28/2021    Heart disease     CAD   s/p ptca with 1 stent 2012    Hidradenitis suppurativa     groin    History of transfusion     Hyperlipidemia     Hypertension     MI (myocardial infarction) ([facility])     \" silent \" M.I. in the past    Morbid obesity with BMI of 50.0-59.9, adult ([facility])     Myocardial infarction ([facility])     mini heart attack    Nicotine dependence     Obesity     PE (pulmonary thromboembolism) ([facility]) 06/2024    small on left side, takes coumadin    Pilonidal cyst     Type 1 non-ST elevation myocardial infarction (NSTEMI) ([facility]) 11/29/2020   [2]   Past Surgical History:  Procedure Laterality Date    CARDIAC SURGERY      CORONARY " ANGIOPLASTY WITH STENT PLACEMENT      x2    INCISION AND DRAINAGE OF WOUND N/A 01/24/2017    Procedure: INCISION AND DRAINAGE (I&D) BUTTOCK, PILONIDAL CYST;  Surgeon: Simba Adhikari MD;  Location: [facility];  Service:     INCISION AND DRAINAGE OF WOUND Right 4/30/2025    Procedure: INCISION AND DRAINAGE (I&D) GROIN;  Surgeon: Stef Mooney MD;  Location: [facility];  Service: General    IR BIOPSY BONE MARROW  06/12/2024    IR BIOPSY KIDNEY RANDOM  07/12/2024    IR BIOPSY KIDNEY RANDOM  07/19/2024    IR TEMPORARY DIALYSIS CATHETER PLACEMENT  06/06/2024    IR TUNNELED CENTRAL LINE PLACEMENT  06/14/2024    IR TUNNELED DIALYSIS CATHETER CHECK/CHANGE/REPOSITION/ANGIOPLASTY  1/2/2025    IR TUNNELED DIALYSIS CATHETER CHECK/CHANGE/REPOSITION/ANGIOPLASTY  2/5/2025    IR TUNNELED DIALYSIS CATHETER CHECK/CHANGE/REPOSITION/ANGIOPLASTY  5/6/2025    IR TUNNELED DIALYSIS CATHETER CHECK/CHANGE/REPOSITION/ANGIOPLASTY  6/18/2025    IR TUNNELED DIALYSIS CATHETER CHECK/CHANGE/REPOSITION/ANGIOPLASTY  7/2/2025    LEG SURGERY Left     I&D of left leg 2012. Had resp failure and had to be intubated during procedure.    SD ARTERIOVENOUS ANASTOMOSIS OPEN DIRECT Left 09/26/2024    Procedure: left arm radio-cephalic AVF creation;  Surgeon: Scout James DO;  Location: [facility];  Service: Vascular    SD ARTERIOVENOUS ANASTOMOSIS OPEN DIRECT Left 12/23/2024    Procedure: left arm brachio-basilic AVF creation;  Surgeon: Scout James DO;  Location: [facility];  Service: Vascular    SD ARTERIOVENOUS ANASTOMOSIS OPEN DIRECT Left 6/5/2025    Procedure: CREATION FISTULA ARTERIOVENOUS (AV) GRAFT;  Surgeon: Scout James DO;  [facility]: [facility];  Service: Vascular    SD DEBRIDEMENT SUBCUTANEOUS TISSUE 1ST 20 SQ CM/< Left 07/06/2021    Procedure: DEBRIDEMENT WOUND (WASH OUT);  Surgeon: Sudheer Mcfarlane MD;  [facility];  Service: General    SD EXC B9 LESION MRGN XCP SK TG T/A/L >4.0 CM Left 04/06/2021    Procedure: EXCISION THIGH MASS;  Surgeon:  Sudheer Mcfarlane MD;  [facility];  Service: General    AL EXCISION H/P/P/U COMPLEX REPAIR Left 2021    Procedure: EXCISION PERINEAL ABSCESS LEFT THIGH;  Surgeon: Sudheer Mcfarlane MD;  [facility];  Service: General    AL NEGATIVE PRESSURE WOUND THERAPY DME <= 50 SQ CM Left 2021    Procedure: APPLICATION VAC DRESSING THIGH;  Surgeon: Sudheer Mcfarlane MD;  [facility];  Service: General    AL REVJ OPN ARVEN FSTL W/O THRMBC DIAL GRF Left 3/6/2025    Procedure: left arm basilic fistula elevation;  Surgeon: Scout James DO;  [facility];  Service: Vascular    WOUND DEBRIDEMENT Left 2021    Procedure: DEBRIDEMENT WOUND AND DRESSING CHANGE (WASH OUT);  Surgeon: Mahin Traore DO;  [facility];  Service: General    WOUND DEBRIDEMENT Left 2021    Procedure: DEBRIDEMENT LOWER EXTREMITY (WASH OUT), left groin and thigh;  Surgeon: Sudheer Mcfarlane MD;  [facility];  Service: General    WOUND DEBRIDEMENT Left 2021    Procedure: DEBRIDEMENT LOWER EXTREMITY (WASH OUT);  Surgeon: Zak Castanon DO;  [facility];  Service: General    WOUND DEBRIDEMENT Left 2021    Procedure: Washout left thigh wound and closure;  Surgeon: Amor Jaramillo DO;  [facility];  Service: General   [3]   Allergies  Allergen Reactions    Keflex [Cephalexin] Facial Swelling and Lip Swelling    Amoxicillin Hives     childhood   [4]   Social History  Tobacco Use   Smoking Status Former    Current packs/day: 0.00    Average packs/day: 1.5 packs/day for 20.0 years (29.9 ttl pk-yrs)    Types: Cigarettes    Start date: 2021    Quit date: 2021    Years since quittin.3    Passive exposure: Past   Smokeless Tobacco Never   [5]   Family History  Problem Relation Name Age of Onset    Heart disease Father      Diabetes Father      Hypertension Mother      Heart disease Mother     [6] No current facility-administered medications for this encounter.    Current  Outpatient Medications:     apixaban (Eliquis) 5 mg, Take 1 tablet (5 mg total) by mouth 2 (two) times a day, Disp: 180 tablet, Rfl: 3    aspirin (ECOTRIN LOW STRENGTH) 81 mg EC tablet, Take 1 tablet (81 mg total) by mouth daily, Disp: 90 tablet, Rfl: 2    atorvastatin (LIPITOR) 80 mg tablet, Take 1 tablet (80 mg total) by mouth daily, Disp: 90 tablet, Rfl: 3    calcitriol (ROCALTROL) 0.25 mcg capsule, Take 3 capsules (0.75 mcg total) by mouth 3 (three) times a week Mon, Wed, Fri, Disp: , Rfl:     calcium acetate (PHOSLO) capsule, Take 2 capsules (1,334 mg total) by mouth 3 (three) times a day, Disp: 540 capsule, Rfl: 3    carvedilol (COREG) 6.25 mg tablet, Take 0.5 tablets (3.125 mg total) by mouth 2 (two) times a day with meals, Disp: , Rfl:     Cholecalciferol (VITAMIN D3) 1,000 units tablet, Take 2 tablets (2,000 Units total) by mouth daily, Disp: 60 tablet, Rfl: 5    isosorbide mononitrate (IMDUR) 60 mg 24 hr tablet, Take 1 tablet (60 mg total) by mouth daily, Disp: 90 tablet, Rfl: 2    metolazone (ZAROXOLYN) 10 mg tablet, Take 1 tablet (10 mg total) by mouth 2 (two) times a week On Monday, Thursday, Disp: , Rfl:     naloxone (NARCAN) 4 mg/0.1 mL nasal spray, Administer 1 spray into a nostril. If no response after 2-3 minutes, give another dose in the other nostril using a new spray., Disp: 1 each, Rfl: 1    oxyCODONE-acetaminophen (Percocet) 5-325 mg per tablet, Take 1 tablet by mouth every 6 (six) hours as needed for moderate pain Max Daily Amount: 4 tablets (Patient not taking: Reported on 6/20/2025), Disp: 20 tablet, Rfl: 0    oxyCODONE-acetaminophen (Percocet) 5-325 mg per tablet, Take 1 tablet by mouth every 6 (six) hours as needed for moderate pain Max Daily Amount: 4 tablets, Disp: 20 tablet, Rfl: 0    Semaglutide-Weight Management (Wegovy) 0.25 MG/0.5ML, Inject 0.25 mg under the skin Once a week (Patient not taking: Reported on 5/19/2025), Disp: , Rfl:     sevelamer (RENAGEL) 800 mg tablet, Take 1  tablet (800 mg total) by mouth 3 (three) times a day with meals, Disp: 540 tablet, Rfl: 1    torsemide (DEMADEX) 100 mg tablet, TAKE ONE TABLET BY MOUTH EVERY DAY (GENERIC FOR DEMADEX), Disp: 90 tablet, Rfl: 1

## 2025-07-03 NOTE — ASSESSMENT & PLAN NOTE
HD MWF recently dialyzing MWTF due to volume overload.  10 kg weight gain since ongoing access issue  Will obtain chest x-ray  Plan is for possible dialysis after IR placing new tunneled catheter today/will follow nephrology recommendation.  Continue home dose diuretics  Fortunately his respiratory status is stable.  O2 sat 99% on room air  Continue fluid restriction and renal diet

## 2025-07-03 NOTE — ASSESSMENT & PLAN NOTE
Lab Results   Component Value Date    EGFR 6 07/03/2025    EGFR 6 06/18/2025    EGFR 6 05/06/2025    CREATININE 9.42 (H) 07/03/2025    CREATININE 8.39 (H) 06/18/2025    CREATININE 9.13 (H) 05/06/2025   -- Low phosphorus diet can continue home phosphate binders and calcitriol and home medications

## 2025-07-03 NOTE — ASSESSMENT & PLAN NOTE
Lab Results   Component Value Date    EGFR 6 07/03/2025    EGFR 6 06/18/2025    EGFR 6 05/06/2025    CREATININE 9.42 (H) 07/03/2025    CREATININE 8.39 (H) 06/18/2025    CREATININE 9.13 (H) 05/06/2025   -- Hemoglobin stable at 9.5, stable  --Not on JOSSELYN due to history of pulmonary embolism

## 2025-07-03 NOTE — ASSESSMENT & PLAN NOTE
Lab Results   Component Value Date    EGFR 6 07/03/2025    EGFR 6 06/18/2025    EGFR 6 05/06/2025    CREATININE 9.42 (H) 07/03/2025    CREATININE 8.39 (H) 06/18/2025    CREATININE 9.13 (H) 05/06/2025

## 2025-07-03 NOTE — ASSESSMENT & PLAN NOTE
-- Blood pressure is high, volume mediated  --Plan for ultrafiltration with dialysis once access is able to be used  --Can continue home antihypertensive agent

## 2025-07-03 NOTE — ASSESSMENT & PLAN NOTE
Lab Results   Component Value Date    EGFR 6 07/03/2025    EGFR 6 06/18/2025    EGFR 6 05/06/2025    CREATININE 9.42 (H) 07/03/2025    CREATININE 8.39 (H) 06/18/2025    CREATININE 9.13 (H) 05/06/2025   See above  Patient was on HD since last year

## 2025-07-03 NOTE — CONSULTS
e-Consult (IPC)  - Interventional Radiology  Joaquin Frankel 45 y.o. male MRN: [id number]  Unit/Bed#: ED-22 Encounter: [id number]          Interventional Radiology has been consulted to evaluate Joaquin Frankel    We were consulted by Emergency Medicine concerning this patient with ESRD on HD s/p RIJV TDC, multiple failed LUE dialysis accesses currently awaiting delayed AVG declot scheduled for next week, who underwent a TDC exchange yesterday for recurrent short interval dysfunction.      Despite good function at exchange yesterday, it was dysfunctional today and he was unable to obtain dialysis.  His last dialysis was 6/30 and he is 9 kg above EDW; given this he was sent to ED to address access dysfunction and obtain dialysis prior to discharge.    Inpatient Consult to IR  Consult performed by: Radames Liu MD  Consult ordered by: Lauryn Hobbs MD        07/03/25    Assessment/Recommendation:     Bedside TDC evaluation demonstrated recurrent dysfunction.  Unfortunately this seems to be anatomic for him as a shorter catheter tends to displace into the SVC with poor flow and a longer catheter displaces into the IVC with poor flow.    We will attempt to optimize position or replace the catheter today.  He is scheduled for AVG declot next week, and consideration can be made towards placement of a new catheter via a new tunnel on that date if AVG function cannot be restored.    11-20 minutes, >50% of the total time devoted to medical consultative verbal/EMR discussion between providers. Written report will be generated in the EMR.     Thank you for allowing Interventional Radiology to participate in the care of Joaquin Frankel. Please don't hesitate to call or TigerText us with any questions.     Radames Liu MD

## 2025-07-03 NOTE — DISCHARGE INSTRUCTIONS
Perma-cath Placement   WHAT YOU NEED TO KNOW:   A perma-cath is a catheter placed through a vein into or near your right atrium. Your right atrium is the right upper chamber of your heart. A perma-cath is used for dialysis in an emergency or until a long-term device is ready to use. After your procedure, you will have some pain and swelling on your chest and neck. You may have some bruises on your chest and neck. You may also have 2 dressings, one on your chest and one on your neck.   DISCHARGE INSTRUCTIONS:   Call 911 for any of the following:   You feel lightheaded, short of breath, and have chest pain.    Your catheter comes out   Contact Interventional Radiology at [phone] (ANDRES PATIENTS: Contact Interventional Radiology at [phone]) (CHEL[facility] PATIENTS: Contact Interventional Radiology at [phone]) if:  Blood soaks through your bandage.   You have new swelling in your arm, neck, face, or chest on your right side.  Your catheter gets wet.    Your bruises or pain get worse.   You have a fever or chills.  Persistent nausea or vomiting.   Your incision is red, swollen, or draining pus.   You have questions or concerns about your condition or care.  Self-care:       Resume your normal diet.  Keep your dressings dry. Do not take a shower or swim. You may take a tub bath, but do not get your dressings wet. Water in your wound can cause bacteria to grow and cause an infection. If your dressing gets wet, dry it off and cover it with dry sterile gauze. Call your healthcare provider. Do not use soaps or ointments.  Do not change your dressings. Your healthcare provider or dialysis nurse will change your dressings. Your dressings should stay in place until your healthcare provider removes them. The dressing on your chest will stay as long as you have the catheter in place. The dressing prevents infection.    Do not remove the red and blue caps from the end of your catheter. The caps prevent air from getting  into your catheter.  Follow up with your healthcare provider as directed: Write down your questions so you remember to ask them during your visits.

## 2025-07-03 NOTE — PLAN OF CARE
Pt. On HD treatment for 2 hours with a UF goal of 2 L as tolerated. Pt on a 3 k 2.5 rodri bath for a potassium of 4.2 on 07/03/25.  Problem: METABOLIC, FLUID AND ELECTROLYTES - ADULT  Goal: Electrolytes maintained within normal limits  Description: INTERVENTIONS:  - Monitor labs and assess patient for signs and symptoms of electrolyte imbalances  - Administer electrolyte replacement as ordered  - Monitor response to electrolyte replacements, including repeat lab results as appropriate  - Instruct patient on fluid and nutrition as appropriate  Outcome: Progressing  Goal: Fluid balance maintained  Description: INTERVENTIONS:  - Monitor labs   - Monitor I/O and WT  - Instruct patient on fluid and nutrition as appropriate  - Assess for signs & symptoms of volume excess or deficit  Outcome: Progressing

## 2025-07-03 NOTE — H&P
H&P - Hospitalist   Name: Joaquin Frankel 45 y.o. male I MRN: [id number]  Unit/Bed#: ED-22 I Date of Admission: 7/3/2025   Date of Service: 7/3/2025 I Hospital Day: 0     Assessment & Plan  Clotted dialysis access (HCC)  Thrombosed LUE AV graft and now with nonfunctioning tunneled HD catheter. Having ongoing issues with recurrent HD catheter malfunction and thrombosis requiring multiple catheter exchanges most recently 7/2/2025.  Unable to access HD catheter today sent to ED from dialysis  Suspect coagulopathy contributing to recurrent thrombosis.  He has Eliquis ordered as outpatient to start.  Will start Eliquis after IR intervention  Volume overload  HD MWF recently dialyzing MWTF due to volume overload.  10 kg weight gain since ongoing access issue  Will obtain chest x-ray  Plan is for possible dialysis after IR placing new tunneled catheter today/will follow nephrology recommendation.  Continue home dose diuretics  Fortunately his respiratory status is stable.  O2 sat 99% on room air  Continue fluid restriction and renal diet  ESRD (end stage renal disease) on dialysis (HCC)  Lab Results   Component Value Date    EGFR 6 07/03/2025    EGFR 6 06/18/2025    EGFR 6 05/06/2025    CREATININE 9.42 (H) 07/03/2025    CREATININE 8.39 (H) 06/18/2025    CREATININE 9.13 (H) 05/06/2025   See above  Patient was on HD since last year  Primary hypertension  Continue Coreg and Imdur  History of pulmonary embolism  Patient completed 6 months of Coumadin until April this year.  He reported no vascular access issue while on Coumadin.  Plan to start Eliquis after IR intervention/see above  Morbid obesity (HCC)  Planning to initiate Wegovy as outpatient      VTE Pharmacologic Prophylaxis: VTE Score: 7 Holding chemical prophylaxis while waiting for IR procedure  Code Status: Full code  Discussion with family:     Anticipated Length of Stay: Patient will be admitted on an inpatient basis with an anticipated length of stay of greater  than 2 midnights secondary to waiting for dialysis access placed and inpatient treatment for volume overload.    History of Present Illness   Chief Complaint: Issue with dialysis access    Joaquin Frankel is a 45 y.o. male with a PMH of ESRD, status post recent left arm AV graft, prior PE, type 2 diabetes, right groin abscess, hypertension and morbid obesity who presents with issues with dialysis access today due to nonfunctioning tunneled HD catheter.  Patient had LUE AVG that was thrombosed.  And he unfortunately has been having ongoing issues with recurrent HD catheter malfunction and thrombosis requiring multiple catheter exchanges most recently 7/2/2025.  His dialysis schedule has increased from  MWF to currently MWTF due to chronic volume overload. Patient reported 10 pound weight gain despite because of interruptions of HD due to ongoing access issues. Today patient was sent to the ED from dialysis center for evaluation as they were unable to perform HD due to nonfunctioning HD catheter. Last HD treatment 6/30/25.  IR was made aware/planning for new tunneled HD catheter likely today. LUE AV graft thrombectomy with previously planned as outpatient on 7/9/2025.     There was concern about coagulopathy as a cause of recurrent thrombosis.  Patient had history of prior PE for which he received 6 months of Coumadin up until April this year.  He did report no access issues for the period when he was on Coumadin .  He already had Eliquis ordered as outpatient to start after IR intervention.     Review of Systems   Constitutional:  Positive for fatigue and unexpected weight change. Negative for appetite change, chills, diaphoresis and fever.   Respiratory:  Positive for shortness of breath. Negative for cough and chest tightness.    Cardiovascular:  Positive for leg swelling.   Gastrointestinal:  Negative for abdominal pain.   All other systems reviewed and are negative.      Historical Information   Past Medical  History[1]  Past Surgical History[2]  Social History[3]  E-Cigarette/Vaping    E-Cigarette Use Never User      E-Cigarette/Vaping Substances    Nicotine No     THC No     CBD No     Flavoring No     Other No     Unknown No        Social History:  Marital Status: Single   Occupation:   Patient Pre-hospital Living Situation: Home  Patient Pre-hospital Level of Mobility: walks      Meds/Allergies     Prior to Admission medications    Medication Sig Start Date End Date Taking? Authorizing Provider   apixaban (Eliquis) 5 mg Take 1 tablet (5 mg total) by mouth 2 (two) times a day 6/25/25   Toma Ortez PA-C   aspirin (ECOTRIN LOW STRENGTH) 81 mg EC tablet Take 1 tablet (81 mg total) by mouth daily 8/29/24   Gino Fulton MD   atorvastatin (LIPITOR) 80 mg tablet Take 1 tablet (80 mg total) by mouth daily 8/29/24   Gino Fulton MD   calcitriol (ROCALTROL) 0.25 mcg capsule Take 3 capsules (0.75 mcg total) by mouth 3 (three) times a week Mon, Wed, Fri 5/7/25   Wanda Navarro DO   calcium acetate (PHOSLO) capsule Take 2 capsules (1,334 mg total) by mouth 3 (three) times a day 3/27/25   PETRA Sandoval   carvedilol (COREG) 6.25 mg tablet Take 0.5 tablets (3.125 mg total) by mouth 2 (two) times a day with meals 5/6/25   Wanda Navarro DO   Cholecalciferol (VITAMIN D3) 1,000 units tablet Take 2 tablets (2,000 Units total) by mouth daily 8/14/24   Hany Mcfarland DO   isosorbide mononitrate (IMDUR) 60 mg 24 hr tablet Take 1 tablet (60 mg total) by mouth daily 8/29/24   Gino Fulton MD   metolazone (ZAROXOLYN) 10 mg tablet Take 1 tablet (10 mg total) by mouth 2 (two) times a week On Monday, Thursday 5/8/25   Wanda Navarro DO   naloxone (NARCAN) 4 mg/0.1 mL nasal spray Administer 1 spray into a nostril. If no response after 2-3 minutes, give another dose in the other nostril using a new spray. 6/20/25 6/20/26  Scout James DO   oxyCODONE-acetaminophen (Percocet) 5-325 mg per tablet Take 1 tablet by mouth every 6  (six) hours as needed for moderate pain Max Daily Amount: 4 tablets  Patient not taking: Reported on 6/20/2025 6/6/25   PETRA Khan   oxyCODONE-acetaminophen (Percocet) 5-325 mg per tablet Take 1 tablet by mouth every 6 (six) hours as needed for moderate pain Max Daily Amount: 4 tablets 6/20/25   Scout James DO   Semaglutide-Weight Management (Wegovy) 0.25 MG/0.5ML Inject 0.25 mg under the skin Once a week  Patient not taking: Reported on 5/19/2025 5/14/25   Historical Provider, MD   sevelamer (RENAGEL) 800 mg tablet Take 1 tablet (800 mg total) by mouth 3 (three) times a day with meals 10/31/24   Charles Spear MD   torsemide (DEMADEX) 100 mg tablet TAKE ONE TABLET BY MOUTH EVERY DAY (GENERIC FOR DEMADEX) 3/24/25   Charles Spear MD     Allergies   Allergen Reactions    Keflex [Cephalexin] Facial Swelling and Lip Swelling    Amoxicillin Hives     childhood       Objective :  Temp:  [98.4 °F (36.9 °C)] 98.4 °F (36.9 °C)  HR:  [75] 75  BP: (192)/(81) 192/81  Resp:  [20] 20  SpO2:  [99 %] 99 %  O2 Device: None (Room air)    Physical Exam  Constitutional:       General: He is not in acute distress.     Appearance: He is not ill-appearing, toxic-appearing or diaphoretic.     Eyes:      General:         Right eye: No discharge.         Left eye: No discharge.       Cardiovascular:      Rate and Rhythm: Normal rate and regular rhythm.      Pulses: Normal pulses.   Pulmonary:      Effort: Pulmonary effort is normal. No respiratory distress.      Breath sounds: No wheezing.   Abdominal:      Palpations: Abdomen is soft. There is no mass.      Tenderness: There is no abdominal tenderness. There is no guarding.     Musculoskeletal:      Comments: Chronic LLE lymphedema from prior operation     Skin:     General: Skin is warm.      Comments: R IJ HD catheter in place with mild surrounding javier     Neurological:      Mental Status: He is oriented to person, place, and time. Mental status is at baseline.  "    Psychiatric:         Behavior: Behavior normal.          Lines/Drains:            Lab Results: I have reviewed the following results:  Results from last 7 days   Lab Units 07/03/25  0947   WBC Thousand/uL 8.67   HEMOGLOBIN g/dL 9.5*   HEMATOCRIT % 28.2*   PLATELETS Thousands/uL 266   SEGS PCT % 68   LYMPHO PCT % 17   MONO PCT % 7   EOS PCT % 6     Results from last 7 days   Lab Units 07/03/25  0947   SODIUM mmol/L 136   POTASSIUM mmol/L 4.2   CHLORIDE mmol/L 97   CO2 mmol/L 26   BUN mg/dL 67*   CREATININE mg/dL 9.42*   ANION GAP mmol/L 13   CALCIUM mg/dL 8.3*   GLUCOSE RANDOM mg/dL 174*     Results from last 7 days   Lab Units 07/03/25  0947   INR  0.89         Lab Results   Component Value Date    HGBA1C 7.2 (H) 04/30/2025    HGBA1C 5.8 (H) 10/24/2024    HGBA1C 6.0 (H) 09/11/2024                 Administrative Statements       ** Please Note: This note has been constructed using a voice recognition system. **         [1]   Past Medical History:  Diagnosis Date    Acute hypoxic respiratory failure ([facility]) 10/07/2023    Arthritis     Breathing difficulty     Cellulitis 10/07/2023    Chronic kidney disease     end stage renal disease    Coronary artery disease     Diabetes mellitus ([facility])     Diabetic neuropathy ([facility]) 05/28/2021    Heart disease     CAD   s/p ptca with 1 stent 2012    Hidradenitis suppurativa     groin    History of transfusion     Hyperlipidemia     Hypertension     MI (myocardial infarction) ([facility])     \" silent \" M.I. in the past    Morbid obesity with BMI of 50.0-59.9, adult ([facility])     Myocardial infarction ([facility])     mini heart attack    Nicotine dependence     Obesity     PE (pulmonary thromboembolism) ([facility]) 06/2024    small on left side, takes coumadin    Pilonidal cyst     Type 1 non-ST elevation myocardial infarction (NSTEMI) ([facility]) 11/29/2020   [2]   Past Surgical History:  Procedure Laterality Date    CARDIAC SURGERY      CORONARY ANGIOPLASTY WITH STENT PLACEMENT      x2    INCISION AND " DRAINAGE OF WOUND N/A 01/24/2017    Procedure: INCISION AND DRAINAGE (I&D) BUTTOCK, PILONIDAL CYST;  Surgeon: Simba Adhikari MD;  Location: [facility];  Service:     INCISION AND DRAINAGE OF WOUND Right 4/30/2025    Procedure: INCISION AND DRAINAGE (I&D) GROIN;  Surgeon: Stef Mooney MD;  Location: [facility];  Service: General    IR BIOPSY BONE MARROW  06/12/2024    IR BIOPSY KIDNEY RANDOM  07/12/2024    IR BIOPSY KIDNEY RANDOM  07/19/2024    IR TEMPORARY DIALYSIS CATHETER PLACEMENT  06/06/2024    IR TUNNELED CENTRAL LINE PLACEMENT  06/14/2024    IR TUNNELED DIALYSIS CATHETER CHECK/CHANGE/REPOSITION/ANGIOPLASTY  1/2/2025    IR TUNNELED DIALYSIS CATHETER CHECK/CHANGE/REPOSITION/ANGIOPLASTY  2/5/2025    IR TUNNELED DIALYSIS CATHETER CHECK/CHANGE/REPOSITION/ANGIOPLASTY  5/6/2025    IR TUNNELED DIALYSIS CATHETER CHECK/CHANGE/REPOSITION/ANGIOPLASTY  6/18/2025    IR TUNNELED DIALYSIS CATHETER CHECK/CHANGE/REPOSITION/ANGIOPLASTY  7/2/2025    LEG SURGERY Left     I&D of left leg 2012. Had resp failure and had to be intubated during procedure.    SC ARTERIOVENOUS ANASTOMOSIS OPEN DIRECT Left 09/26/2024    Procedure: left arm radio-cephalic AVF creation;  Surgeon: Scout James DO;  Location: [facility];  Service: Vascular    SC ARTERIOVENOUS ANASTOMOSIS OPEN DIRECT Left 12/23/2024    Procedure: left arm brachio-basilic AVF creation;  Surgeon: Scout James DO;  Location: [facility];  Service: Vascular    SC ARTERIOVENOUS ANASTOMOSIS OPEN DIRECT Left 6/5/2025    Procedure: CREATION FISTULA ARTERIOVENOUS (AV) GRAFT;  Surgeon: Scout James DO;  Location:  MAIN OR;  Service: Vascular    SC DEBRIDEMENT SUBCUTANEOUS TISSUE 1ST 20 SQ CM/< Left 07/06/2021    Procedure: DEBRIDEMENT WOUND (WASH OUT);  Surgeon: Sudheer Mcfarlane MD;  [facility]: [facility];  Service: General    SC EXC B9 LESION MRGN XCP SK TG T/A/L >4.0 CM Left 04/06/2021    Procedure: EXCISION THIGH MASS;  Surgeon: Sudheer Mcfarlane MD;  [facility]: [facility];  Service: General     KS EXCISION H/P/P/U COMPLEX REPAIR Left 2021    Procedure: EXCISION PERINEAL ABSCESS LEFT THIGH;  Surgeon: Sudheer Mcfarlane MD;  [facility];  Service: General    KS NEGATIVE PRESSURE WOUND THERAPY DME <= 50 SQ CM Left 2021    Procedure: APPLICATION VAC DRESSING THIGH;  Surgeon: Sudheer Mcfarlane MD;  [facility]: [facility];  Service: General    KS REVJ OPN ARVEN FSTL W/O THRMBC DIAL GRF Left 3/6/2025    Procedure: left arm basilic fistula elevation;  Surgeon: Scout James DO;  [facility];  Service: Vascular    WOUND DEBRIDEMENT Left 2021    Procedure: DEBRIDEMENT WOUND AND DRESSING CHANGE (WASH OUT);  Surgeon: Mahin Traore DO;  [facility];  Service: General    WOUND DEBRIDEMENT Left 2021    Procedure: DEBRIDEMENT LOWER EXTREMITY (WASH OUT), left groin and thigh;  Surgeon: Sudheer Mcfarlane MD;  [facility];  Service: General    WOUND DEBRIDEMENT Left 2021    Procedure: DEBRIDEMENT LOWER EXTREMITY (WASH OUT);  Surgeon: Zak Castanon DO;  [facility];  Service: General    WOUND DEBRIDEMENT Left 2021    Procedure: Washout left thigh wound and closure;  Surgeon: Amor Jaramillo DO;  [facility];  Service: General   [3]   Social History  Tobacco Use    Smoking status: Former     Current packs/day: 0.00     Average packs/day: 1.5 packs/day for 20.0 years (29.9 ttl pk-yrs)     Types: Cigarettes     Start date: 2021     Quit date: 2021     Years since quittin.3     Passive exposure: Past    Smokeless tobacco: Never   Vaping Use    Vaping status: Never Used   Substance and Sexual Activity    Alcohol use: Not Currently    Drug use: Never    Sexual activity: Not Currently     Partners: Female

## 2025-07-03 NOTE — ASSESSMENT & PLAN NOTE
Patient completed 6 months of Coumadin until April this year.  He reported no vascular access issue while on Coumadin.  Plan to start Eliquis after IR intervention/see above

## 2025-07-03 NOTE — BRIEF OP NOTE (RAD/CATH)
INTERVENTIONAL RADIOLOGY PROCEDURE NOTE    Date: 7/3/2025    Procedure:   Procedure Summary       Date:  Room / Location:     Anesthesia Start:  Anesthesia Stop:     Procedure:  Diagnosis:     Scheduled Providers:  Responsible Provider:     Anesthesia Type: Not recorded ASA Status: Not recorded            Preoperative diagnosis:   1. Dialysis patient ([facility])    2. ESRD (end stage renal disease) on dialysis ([facility])    3. Volume overload    4. Hemodialysis catheter dysfunction, initial encounter ([facility])    5. Complication of vascular dialysis catheter         Postoperative diagnosis: Same.    Surgeon: Radames Liu MD     Assistant: None. No qualified resident was available.    Blood loss: 40 mL    Specimens: None     Findings:   TDC remained well positioned with catheter tip in the deep right atrium.  There was slow, but not absent, flow through both lumens (an improvement from patient report of no flow).    Venogram through both lumens showed brisk antegrade flow with no fibrin sheath.    Catheter was exchanged over a guidewire for a 32 cm Robe Split catheter.  After prolonged efforts, the catheter position was optimized to produce excellent flow.    Complications: None immediate.    Anesthesia: conscious sedation

## 2025-07-03 NOTE — HEMODIALYSIS
Post-Dialysis RN Treatment Note    Blood Pressure:  Pre 112/50 mm/Hg  Post 120/77 mmHg   EDW:  168 kg    Weight:  Pre None Reported kg   Post None Reported kg   Mode of weight measurement: N/A   Volume Removed:  2200 ml    Treatment duration: 105 minutes    NS given:  No    Treatment shortened Yes, describe: 15 minutes due to system clotting   Medications given during Rx: None Reported   Estimated Kt/V:  None Reported   Access type: Permacath/TDC   Needle Gauge: n/a   Access Issues: Yes, describe: Lines reversed 50 minutes into treatment due to elevated arterial pressures.   1 hour and 5 minutes into treatment bfr decreased to 350 due to elevated venous pressures.   1 hour and 15 minutes bfr decreased to 300 due to elevated venous pressures.  1 hour and 25 minutes bfr decreased to 250 due to elevated venous pressures  1 hour and 37 minutes bfr decreased to 200 due to elevated venous pressures.  1 hour and 42 minutes bfr decreased to 150 due to elevated venous pressures.   Report called to primary nurse:   Yes Cori CARDOSO RN

## 2025-07-04 ENCOUNTER — APPOINTMENT (INPATIENT)
Dept: DIALYSIS | Facility: HOSPITAL | Age: 45
End: 2025-07-04
Payer: COMMERCIAL

## 2025-07-04 VITALS
HEART RATE: 74 BPM | TEMPERATURE: 98.3 F | RESPIRATION RATE: 16 BRPM | OXYGEN SATURATION: 96 % | DIASTOLIC BLOOD PRESSURE: 43 MMHG | SYSTOLIC BLOOD PRESSURE: 141 MMHG

## 2025-07-04 LAB
ANION GAP SERPL CALCULATED.3IONS-SCNC: 11 MMOL/L (ref 4–13)
APTT PPP: 42 SECONDS (ref 23–34)
APTT PPP: 60 SECONDS (ref 23–34)
BASOPHILS # BLD AUTO: 0.05 THOUSANDS/ÂΜL (ref 0–0.1)
BASOPHILS NFR BLD AUTO: 1 % (ref 0–1)
BUN SERPL-MCNC: 55 MG/DL (ref 5–25)
CALCIUM SERPL-MCNC: 8.3 MG/DL (ref 8.4–10.2)
CHLORIDE SERPL-SCNC: 97 MMOL/L (ref 96–108)
CO2 SERPL-SCNC: 26 MMOL/L (ref 21–32)
CREAT SERPL-MCNC: 8.72 MG/DL (ref 0.6–1.3)
EOSINOPHIL # BLD AUTO: 0.69 THOUSAND/ÂΜL (ref 0–0.61)
EOSINOPHIL NFR BLD AUTO: 8 % (ref 0–6)
ERYTHROCYTE [DISTWIDTH] IN BLOOD BY AUTOMATED COUNT: 14.6 % (ref 11.6–15.1)
GFR SERPL CREATININE-BSD FRML MDRD: 6 ML/MIN/1.73SQ M
GLUCOSE SERPL-MCNC: 111 MG/DL (ref 65–140)
GLUCOSE SERPL-MCNC: 138 MG/DL (ref 65–140)
GLUCOSE SERPL-MCNC: 164 MG/DL (ref 65–140)
HCT VFR BLD AUTO: 26.4 % (ref 36.5–49.3)
HGB BLD-MCNC: 8.6 G/DL (ref 12–17)
IMM GRANULOCYTES # BLD AUTO: 0.05 THOUSAND/UL (ref 0–0.2)
IMM GRANULOCYTES NFR BLD AUTO: 1 % (ref 0–2)
LYMPHOCYTES # BLD AUTO: 1.32 THOUSANDS/ÂΜL (ref 0.6–4.47)
LYMPHOCYTES NFR BLD AUTO: 16 % (ref 14–44)
MCH RBC QN AUTO: 32.1 PG (ref 26.8–34.3)
MCHC RBC AUTO-ENTMCNC: 32.6 G/DL (ref 31.4–37.4)
MCV RBC AUTO: 99 FL (ref 82–98)
MONOCYTES # BLD AUTO: 0.49 THOUSAND/ÂΜL (ref 0.17–1.22)
MONOCYTES NFR BLD AUTO: 6 % (ref 4–12)
NEUTROPHILS # BLD AUTO: 5.62 THOUSANDS/ÂΜL (ref 1.85–7.62)
NEUTS SEG NFR BLD AUTO: 68 % (ref 43–75)
NRBC BLD AUTO-RTO: 0 /100 WBCS
PHOSPHATE SERPL-MCNC: 7.7 MG/DL (ref 2.7–4.5)
PLATELET # BLD AUTO: 223 THOUSANDS/UL (ref 149–390)
PMV BLD AUTO: 9.1 FL (ref 8.9–12.7)
POTASSIUM SERPL-SCNC: 4.1 MMOL/L (ref 3.5–5.3)
RBC # BLD AUTO: 2.68 MILLION/UL (ref 3.88–5.62)
SODIUM SERPL-SCNC: 134 MMOL/L (ref 135–147)
WBC # BLD AUTO: 8.22 THOUSAND/UL (ref 4.31–10.16)

## 2025-07-04 PROCEDURE — 80048 BASIC METABOLIC PNL TOTAL CA: CPT | Performed by: INTERNAL MEDICINE

## 2025-07-04 PROCEDURE — 85025 COMPLETE CBC W/AUTO DIFF WBC: CPT | Performed by: INTERNAL MEDICINE

## 2025-07-04 PROCEDURE — 87081 CULTURE SCREEN ONLY: CPT | Performed by: INTERNAL MEDICINE

## 2025-07-04 PROCEDURE — 99239 HOSP IP/OBS DSCHRG MGMT >30: CPT | Performed by: HOSPITALIST

## 2025-07-04 PROCEDURE — 82948 REAGENT STRIP/BLOOD GLUCOSE: CPT

## 2025-07-04 PROCEDURE — 85730 THROMBOPLASTIN TIME PARTIAL: CPT | Performed by: INTERNAL MEDICINE

## 2025-07-04 PROCEDURE — 84100 ASSAY OF PHOSPHORUS: CPT | Performed by: INTERNAL MEDICINE

## 2025-07-04 RX ORDER — OXYCODONE AND ACETAMINOPHEN 5; 325 MG/1; MG/1
1 TABLET ORAL EVERY 6 HOURS PRN
Qty: 15 TABLET | Refills: 0 | Status: SHIPPED | OUTPATIENT
Start: 2025-07-04 | End: 2025-07-09

## 2025-07-04 RX ORDER — OXYCODONE AND ACETAMINOPHEN 5; 325 MG/1; MG/1
1 TABLET ORAL EVERY 8 HOURS PRN
Qty: 15 TABLET | Refills: 0 | Status: SHIPPED | OUTPATIENT
Start: 2025-07-04 | End: 2025-07-09

## 2025-07-04 RX ORDER — ONDANSETRON 2 MG/ML
4 INJECTION INTRAMUSCULAR; INTRAVENOUS EVERY 6 HOURS PRN
Status: DISCONTINUED | OUTPATIENT
Start: 2025-07-04 | End: 2025-07-04 | Stop reason: HOSPADM

## 2025-07-04 RX ADMIN — HYDROMORPHONE HYDROCHLORIDE 0.5 MG: 1 INJECTION, SOLUTION INTRAMUSCULAR; INTRAVENOUS; SUBCUTANEOUS at 07:14

## 2025-07-04 RX ADMIN — OXYCODONE HYDROCHLORIDE AND ACETAMINOPHEN 1 TABLET: 5; 325 TABLET ORAL at 04:34

## 2025-07-04 RX ADMIN — CARVEDILOL 3.12 MG: 3.12 TABLET, FILM COATED ORAL at 07:14

## 2025-07-04 RX ADMIN — ASPIRIN 81 MG: 81 TABLET, DELAYED RELEASE ORAL at 13:07

## 2025-07-04 RX ADMIN — CALCITRIOL CAPSULES 0.25 MCG 0.75 MCG: 0.25 CAPSULE ORAL at 13:07

## 2025-07-04 RX ADMIN — TORSEMIDE 100 MG: 100 TABLET ORAL at 13:07

## 2025-07-04 RX ADMIN — HYDROMORPHONE HYDROCHLORIDE 0.5 MG: 1 INJECTION, SOLUTION INTRAMUSCULAR; INTRAVENOUS; SUBCUTANEOUS at 11:10

## 2025-07-04 RX ADMIN — SEVELAMER HYDROCHLORIDE 800 MG: 800 TABLET ORAL at 13:09

## 2025-07-04 RX ADMIN — CALCIUM ACETATE 1334 MG: 667 CAPSULE ORAL at 13:07

## 2025-07-04 RX ADMIN — Medication 2000 UNITS: at 13:06

## 2025-07-04 RX ADMIN — SEVELAMER HYDROCHLORIDE 800 MG: 800 TABLET ORAL at 07:14

## 2025-07-04 RX ADMIN — INSULIN LISPRO 2 UNITS: 100 INJECTION, SOLUTION INTRAVENOUS; SUBCUTANEOUS at 13:29

## 2025-07-04 RX ADMIN — ISOSORBIDE MONONITRATE 60 MG: 60 TABLET, EXTENDED RELEASE ORAL at 13:06

## 2025-07-04 RX ADMIN — APIXABAN 5 MG: 5 TABLET, FILM COATED ORAL at 13:29

## 2025-07-04 RX ADMIN — HEPARIN SODIUM 18 UNITS/KG/HR: 10000 INJECTION, SOLUTION INTRAVENOUS at 09:21

## 2025-07-04 NOTE — ASSESSMENT & PLAN NOTE
Thrombosed LUE AV graft and now with nonfunctioning tunneled HD catheter. Having ongoing issues with recurrent HD catheter malfunction and thrombosis requiring multiple catheter exchanges most recently 7/2/2025.  Unable to access HD catheter today sent to ED from dialysis  Suspect coagulopathy contributing to recurrent thrombosis.  He has Eliquis ordered as outpatient to start.    IR intervention 7/3 restored flow to blocked catheter  Plan:  Start Eliquis 5 mg twice a day  Outpatient thrombectomy scheduled for 7/9. Contact interventional team in advance of procedure to ensure appropriate planning for Eliquis preprocedure.

## 2025-07-04 NOTE — ASSESSMENT & PLAN NOTE
Lab Results   Component Value Date    EGFR 6 07/04/2025    EGFR 6 07/03/2025    EGFR 6 06/18/2025    CREATININE 8.72 (H) 07/04/2025    CREATININE 9.42 (H) 07/03/2025    CREATININE 8.39 (H) 06/18/2025   See above  Patient was on HD since last year

## 2025-07-04 NOTE — UTILIZATION REVIEW
"Initial Clinical Review    Admission: Date/Time/Statement:   Admission Orders (From admission, onward)       Ordered        07/03/25 1047  INPATIENT ADMISSION  Once                          Orders Placed This Encounter   Procedures    INPATIENT ADMISSION     Standing Status:   Standing     Number of Occurrences:   1     Level of Care:   Med Surg [16]     Estimated length of stay:   More than 2 Midnights     Certification:   I certify that inpatient services are medically necessary for this patient for a duration of greater than two midnights. See H&P and MD Progress Notes for additional information about the patient's course of treatment.     ED Arrival Information       Expected   7/3/2025     Arrival   7/3/2025 09:20    Acuity   Urgent              Means of arrival   Walk-In    Escorted by   Self    Service   Hospitalist    Admission type   Emergency              Arrival complaint   dialysis catheter dysfunction             Chief Complaint   Patient presents with    Vascular Access Problem     Per pre-arrival note: \"ESRD pt on HD MWF (with extra treatment every Thursday) with thrombosed LUE AVG and recurrent tunneled permcath dysfunction despite multiple catheter exchanges. S/p catheter exchange 7/2/25 but now unable to flow at outpatient dialysis this am. Unable to perform any substantial dialysis today. +chronic issues with volume overload, now recurrent due to missed HD. Last treatment 6/30. Dr Kendall recommending pt sent to ER for evaluation, IR evaluation and need to HD.\"       Initial Presentation: 45 y.o. male with a PMH of ESRD, status post recent left arm AV graft, prior PE, type 2 diabetes, right groin abscess, hypertension and morbid obesity who presents with issues with dialysis access today due to nonfunctioning tunneled HD catheter. Patient had LUE AVG that was thrombosed. He unfortunately has been having ongoing issues with recurrent HD catheter malfunction and thrombosis requiring multiple catheter " exchanges most recently 7/2/2025.  His dialysis schedule has increased from MWF to currently MWTF due to chronic volume overload. Patient reported 10 pound weight gain despite because of interruptions of HD due to ongoing access issues.Today patient was sent to the ED from dialysis center for evaluation as they were unable to perform HD due to nonfunctioning HD catheter. Last HD treatment 6/30/25. IR was made aware/planning for new tunneled HD catheter likely today. LUE AV graft thrombectomy with previously planned as outpatient on 7/9/2025. Plan: Inpatient admission for evaluation and treatment of clotted dialysis access, volume overload, ESRD on dialysis, HTN, hx of PE, morbid obesity: CXR, plan is for possible dialysis after IR placing new tunneled catheter today, Nephrology consult, renal diet with fluid restriction, continue Coreg, Imdur, plan to start Eliquis after IR intervention.     IR consult: Bedside TDC evaluation demonstrated recurrent dysfunction. Unfortunately this seems to be anatomic for him as a shorter catheter tends to displace into the SVC with poor flow and a longer catheter displaces into the IVC with poor flow. We will attempt to optimize position or replace the catheter today. He is scheduled for AVG declot next week, and consideration can be made towards placement of a new catheter via a new tunnel on that date if AVG function cannot be restored.     Nephrology consult: Plan for ultrafiltration with dialysis once access is able to be used. Can continue home antihypertensive agent. Holding anticoagulation at this time. Patient was scheduled for thrombectomy on July 9 for the clotted AV graft. Plan for dialysis this afternoon once access issues resolved and plan again for dialysis tomorrow     Anticipated Length of Stay/Certification Statement: Patient will be admitted on an inpatient basis with an anticipated length of stay of greater than 2 midnights secondary to waiting for dialysis access  placed and inpatient treatment for volume overload.     Date: 7/4   Day 2:     Internal medicine: IR intervention 7/3 restored flow to blocked catheter. Start Eliquis 5 mg twice a day. Outpatient thrombectomy scheduled for 7/9. Contact interventional team in advance of procedure to ensure appropriate planning for Eliquis preprocedure. Continue dialysis schedule.     ED Treatment-Medication Administration from 07/03/2025 0849 to 07/03/2025 1930         Date/Time Order Dose Route Action     07/03/2025 1131 HYDROmorphone (DILAUDID) injection 0.5 mg 0.5 mg Intravenous Given     07/03/2025 1918 HYDROmorphone (DILAUDID) injection 0.5 mg 0.5 mg Intravenous Given     07/03/2025 1245 apixaban (ELIQUIS) tablet 5 mg -- Oral Held Dose     07/03/2025 1245 aspirin (ECOTRIN LOW STRENGTH) EC tablet 81 mg -- Oral Held Dose     07/03/2025 1400 Cholecalciferol (VITAMIN D3) tablet 2,000 Units 2,000 Units Oral Given     07/03/2025 1359 isosorbide mononitrate (IMDUR) 24 hr tablet 60 mg 60 mg Oral Given     07/03/2025 1245 oxyCODONE-acetaminophen (PERCOCET) 5-325 mg per tablet 1 tablet 1 tablet Oral Given     07/03/2025 1359 torsemide (DEMADEX) tablet 100 mg 100 mg Oral Given     07/03/2025 1914 insulin lispro (HumALOG/ADMELOG) 100 units/mL subcutaneous injection 2-12 Units 2 Units Subcutaneous Given     07/03/2025 1436 midazolam (VERSED) injection 1 mg Intravenous Given     07/03/2025 1451 midazolam (VERSED) injection 1 mg Intravenous Given     07/03/2025 1501 midazolam (VERSED) injection 0.5 mg Intravenous Given     07/03/2025 1437 fentaNYL injection 50 mcg Intravenous Given     07/03/2025 1451 fentaNYL injection 50 mcg Intravenous Given     07/03/2025 1501 fentaNYL injection 25 mcg Intravenous Given     07/03/2025 1637 vancomycin (VANCOCIN) 2,000 mg in sodium chloride 0.9 % 500 mL IVPB 2,000 mg Intravenous New Bag     07/03/2025 1537 iohexol (OMNIPAQUE) 350 MG/ML injection (SINGLE-DOSE) 50 mL 50 mL Intravenous Given             Scheduled Medications:  [Held by provider] apixaban, 5 mg, Oral, BID  aspirin, 81 mg, Oral, Daily  atorvastatin, 80 mg, Oral, Daily With Dinner  calcitriol, 0.75 mcg, Oral, Once per day on Monday Wednesday Friday  calcium acetate, 1,334 mg, Oral, TID  carvedilol, 3.125 mg, Oral, BID With Meals  Cholecalciferol, 2,000 Units, Oral, Daily  insulin lispro, 2-12 Units, Subcutaneous, TID AC  isosorbide mononitrate, 60 mg, Oral, Daily  metolazone, 10 mg, Oral, Once per day on Monday Thursday  sevelamer, 800 mg, Oral, TID With Meals  torsemide, 100 mg, Oral, Daily      Continuous IV Infusions:     PRN Meds:  acetaminophen, 650 mg, Oral, Q6H PRN  HYDROmorphone, 0.5 mg, Intravenous, Q4H PRN  ondansetron, 4 mg, Intravenous, Q6H PRN  oxyCODONE-acetaminophen, 1 tablet, Oral, Q6H PRN      ED Triage Vitals   Temperature Pulse Respirations Blood Pressure SpO2 Pain Score   07/03/25 0929 07/03/25 0929 07/03/25 0929 07/03/25 0929 07/03/25 0929 07/03/25 1131   98.4 °F (36.9 °C) 75 20 (!) 192/81 99 % 8     Weight (last 2 days)       None            Vital Signs (last 3 days)       Date/Time Temp Pulse Resp BP MAP (mmHg) SpO2 O2 Device Patient Position - Orthostatic VS Grant Coma Scale Score Pain    07/04/25 1200 -- 68 14 138/53 82 -- -- -- -- --    07/04/25 1130 -- 70 16 151/56 87 -- -- -- -- --    07/04/25 1110 -- -- -- -- -- -- -- -- -- 8    07/04/25 1100 -- 82 16 126/40 70 -- -- -- -- --    07/04/25 1030 -- 85 16 141/115 123 -- -- -- -- --    07/04/25 1000 -- 60 16 121/106 111 -- -- -- -- --    07/04/25 0930 -- 77 16 148/118 127 -- -- -- -- --    07/04/25 0900 -- 85 16 155/89 110 -- -- -- -- --    07/04/25 0850 -- 87 16 157/45 80 -- -- Lying -- --    07/04/25 0714 -- -- -- -- -- -- -- -- 15 9    07/04/25 0658 98.3 °F (36.8 °C) 75 18 142/63 90 96 % -- Lying -- --    07/04/25 0434 -- -- -- -- -- -- -- -- -- 9    07/03/25 2343 -- -- -- -- -- -- -- -- -- 8    07/03/25 2154 97.3 °F (36.3 °C) 78 18 138/78 102 96 % -- -- -- --     07/03/25 2018 -- -- -- -- -- -- -- -- -- 9 07/03/25 2000 -- -- -- -- -- -- -- -- 15 --    07/03/25 1958 97.7 °F (36.5 °C) 88 18 125/85 101 97 % None (Room air) Sitting -- 9 07/03/25 1918 -- -- -- -- -- -- -- -- -- 9 07/03/25 1917 -- 84 -- 134/94 108 98 % -- Sitting -- --    07/03/25 1830 -- 77 18 120/77 94 97 % -- -- -- --    07/03/25 1801 -- 75 -- -- -- 97 % -- -- -- --    07/03/25 1800 -- 73 18 129/59 85 -- -- -- -- --    07/03/25 1730 -- 77 18 130/53 76 97 % -- -- -- --    07/03/25 1716 -- 76 18 123/51 73 98 % -- -- -- --    07/03/25 1701 -- 71 -- 116/47 68 97 % -- -- -- --    07/03/25 1700 -- 70 18 124/60 87 -- -- -- -- --    07/03/25 1645 -- 71 -- 124/60 87 98 % -- -- -- --    07/03/25 1630 -- 69 18 133/60 86 -- -- -- -- --    07/03/25 1621 -- 72 18 112/50 72 97 % -- -- -- --    07/03/25 1600 -- 70 -- 108/47 68 93 % -- -- -- --    07/03/25 1546 -- -- -- -- -- 94 % -- -- -- --    07/03/25 1545 -- 77 18 131/55 79 94 % None (Room air) Lying -- --    07/03/25 1533 -- 71 19 138/56 -- 99 % -- -- -- --    07/03/25 1528 -- 73 16 141/64 -- 99 % -- -- -- --    07/03/25 1523 -- 71 16 149/60 -- 100 % -- -- -- --    07/03/25 1518 -- 71 15 150/70 -- 99 % -- -- -- --    07/03/25 1513 -- 71 14 156/72 -- 99 % -- -- -- --    07/03/25 1508 -- 76 14 138/65 -- 98 % -- -- -- --    07/03/25 1503 -- 78 15 139/63 -- 98 % -- -- -- --    07/03/25 1458 -- 72 16 142/65 -- 99 % -- -- -- --    07/03/25 1453 -- 72 18 126/60 -- 99 % -- -- -- --    07/03/25 1448 -- 75 17 127/63 -- 99 % -- -- -- --    07/03/25 1443 -- 71 17 130/63 -- 98 % -- -- -- --    07/03/25 1438 -- 69 20 130/66 -- 97 % -- -- -- --    07/03/25 1435 -- 71 20 121/92 -- 96 % -- -- -- 8 07/03/25 1344 -- 83 18 127/61 -- 97 % None (Room air) Lying -- --    07/03/25 1245 -- -- -- -- -- -- -- -- -- 9 07/03/25 1131 -- -- -- -- -- -- -- -- -- 8 07/03/25 1027 -- -- -- -- -- -- None (Room air) -- 15 --    07/03/25 0929 98.4 °F (36.9 °C) 75 20 192/81 117 99 % None (Room  air) Sitting -- --              Pertinent Labs/Diagnostic Test Results:   Radiology:  IR tunneled dialysis catheter check/change/reposition/angioplasty    (Results Pending)   XR chest portable    (Results Pending)     Cardiology:  No orders to display     GI:  No orders to display           Results from last 7 days   Lab Units 07/04/25  0435 07/03/25  1737 07/03/25  0947   WBC Thousand/uL 8.22 8.39 8.67   HEMOGLOBIN g/dL 8.6* 9.3* 9.5*   HEMATOCRIT % 26.4* 27.1* 28.2*   PLATELETS Thousands/uL 223 234 266   TOTAL NEUT ABS Thousands/µL 5.62  --  6.00         Results from last 7 days   Lab Units 07/04/25  0435 07/03/25  0947   SODIUM mmol/L 134* 136   POTASSIUM mmol/L 4.1 4.2   CHLORIDE mmol/L 97 97   CO2 mmol/L 26 26   ANION GAP mmol/L 11 13   BUN mg/dL 55* 67*   CREATININE mg/dL 8.72* 9.42*   EGFR ml/min/1.73sq m 6 6   CALCIUM mg/dL 8.3* 8.3*   PHOSPHORUS mg/dL 7.7*  --          Results from last 7 days   Lab Units 07/04/25  1047 07/04/25  0659 07/03/25  1913 07/03/25  1746   POC GLUCOSE mg/dl 164* 111 162* 161*     Results from last 7 days   Lab Units 07/04/25  0435 07/03/25  0947   GLUCOSE RANDOM mg/dL 138 174*           Results from last 7 days   Lab Units 07/04/25  1113 07/04/25  0431 07/03/25  1737 07/03/25  0947   PROTIME seconds  --   --  12.7 12.7   INR   --   --  0.89 0.89   PTT seconds 42* 60* 26 26         Past Medical History[1]  Present on Admission:   Primary hypertension   Volume overload   Morbid obesity ([facility])      Admitting Diagnosis: Dialysis patient ([facility]) [Z99.2]  ESRD (end stage renal disease) on dialysis ([facility]) [N18.6, Z99.2]  Volume overload [E87.70]  Vascular catheter dysfunction ([facility]) [T82.518A]  Hemodialysis catheter dysfunction, initial encounter ([facility]) [T82.41XA]  Complication of vascular dialysis catheter [T82.9XXA]  Age/Sex: 45 y.o. male    Network Utilization Review Department  ATTENTION: Please call with any questions or concerns to [phone] and carefully listen to the prompts so that  "you are directed to the right person. All voicemails are confidential.   For Discharge needs, contact Care Management DC Support Team at [phone] opt. 2  Send all requests for admission clinical reviews, approved or denied determinations and any other requests to dedicated fax number below belonging to the [facility] where the patient is receiving treatment. List of dedicated fax numbers for the Facilities:  FACILITY NAME UR FAX NUMBER   ADMISSION DENIALS (Administrative/Medical Necessity) [phone]   DISCHARGE SUPPORT TEAM (NETWORK) [phone]   PARENT CHILD HEALTH (Maternity/NICU/Pediatrics) [phone]   [facility] [phone]   [facility] [phone]   [facility] [phone]   [facility] [phone]   Counts include 234 beds at the [facility] [phone]   [facility] [phone]   [facility] [phone]   [facility] [phone]   [facility] [phone]   [facility] [phone]   [facility] [phone]   [facility] [phone]              [1]   Past Medical History:  Diagnosis Date    Acute hypoxic respiratory failure (HCC) 10/07/2023    Arthritis     Breathing difficulty     Cellulitis 10/07/2023    Chronic kidney disease     end stage renal disease    Coronary artery disease     Diabetes mellitus (HCC)     Diabetic neuropathy (HCC) 05/28/2021    Heart disease     CAD   s/p ptca with 1 stent 2012    Hidradenitis suppurativa     groin    History of transfusion     Hyperlipidemia     Hypertension     MI (myocardial infarction) (HCC)     \" silent \" M.I. in the past    Morbid obesity with BMI of 50.0-59.9, adult (HCC)     Myocardial infarction (HCC)     mini heart attack    Nicotine " dependence     Obesity     PE (pulmonary thromboembolism) ([facility]) 06/2024    small on left side, takes coumadin    Pilonidal cyst     Type 1 non-ST elevation myocardial infarction (NSTEMI) ([facility]) 11/29/2020

## 2025-07-04 NOTE — PLAN OF CARE
Post-Dialysis RN Treatment Note    Blood Pressure:  Pre 157/45 mm/Hg  Post 141/40 mmHg   EDW:  TBD kg    Weight:  Pre 178.5 kg   Post 175.4 kg   Mode of weight measurement: Bed Scale   Volume Removed:  4200 ml    Treatment duration: 240 minutes    NS given:  Yes, n/c getting dizzy    Treatment shortened No   Medications given during Rx: Dilaudid 0.5   Estimated Kt/V:  0.98   Access type: Permacath/TDC   Needle Gauge:    Access Issues: Yes, describe: lines reversed    Report called to primary nurse:   Yes / Lola Westbrook RN        5000 ml as tolerated, 4 hrs, 3K bath  Problem: METABOLIC, FLUID AND ELECTROLYTES - ADULT  Goal: Electrolytes maintained within normal limits  Description: INTERVENTIONS:  - Monitor labs and assess patient for signs and symptoms of electrolyte imbalances  - Administer electrolyte replacement as ordered  - Monitor response to electrolyte replacements, including repeat lab results as appropriate  - Instruct patient on fluid and nutrition as appropriate  Outcome: Progressing  Goal: Fluid balance maintained  Description: INTERVENTIONS:  - Monitor labs   - Monitor I/O and WT  - Instruct patient on fluid and nutrition as appropriate  - Assess for signs & symptoms of volume excess or deficit  Outcome: Progressing

## 2025-07-04 NOTE — PLAN OF CARE
Problem: METABOLIC, FLUID AND ELECTROLYTES - ADULT  Goal: Electrolytes maintained within normal limits  Description: INTERVENTIONS:  - Monitor labs and assess patient for signs and symptoms of electrolyte imbalances  - Administer electrolyte replacement as ordered  - Monitor response to electrolyte replacements, including repeat lab results as appropriate  - Instruct patient on fluid and nutrition as appropriate  7/4/2025 1354 by Lola Westbrook RN  Outcome: Adequate for Discharge  7/4/2025 0745 by Lola Westbrook RN  Outcome: Progressing  Goal: Fluid balance maintained  Description: INTERVENTIONS:  - Monitor labs   - Monitor I/O and WT  - Instruct patient on fluid and nutrition as appropriate  - Assess for signs & symptoms of volume excess or deficit  7/4/2025 1354 by Lola Westbrook RN  Outcome: Adequate for Discharge  7/4/2025 0745 by Lola Westbrook RN  Outcome: Progressing     Problem: PAIN - ADULT  Goal: Verbalizes/displays adequate comfort level or baseline comfort level  Description: Interventions:  - Encourage patient to monitor pain and request assistance  - Assess pain using appropriate pain scale  - Administer analgesics as ordered based on type and severity of pain and evaluate response  - Implement non-pharmacological measures as appropriate and evaluate response  - Consider cultural and social influences on pain and pain management  - Notify physician/advanced practitioner if interventions unsuccessful or patient reports new pain  - Educate patient/family on pain management process including their role and importance of  reporting pain   - Provide non-pharmacologic/complimentary pain relief interventions  7/4/2025 1354 by Lola Westbrook RN  Outcome: Adequate for Discharge  7/4/2025 0745 by Lola Westbrook RN  Outcome: Progressing     Problem: INFECTION - ADULT  Goal: Absence or prevention of progression during hospitalization  Description: INTERVENTIONS:  - Assess and  monitor for signs and symptoms of infection  - Monitor lab/diagnostic results  - Monitor all insertion sites, i.e. indwelling lines, tubes, and drains  - Monitor endotracheal if appropriate and nasal secretions for changes in amount and color  - [place] appropriate cooling/warming therapies per order  - Administer medications as ordered  - Instruct and encourage patient and family to use good hand hygiene technique  - Identify and instruct in appropriate isolation precautions for identified infection/condition  7/4/2025 1354 by Lola Westbrook RN  Outcome: Adequate for Discharge  7/4/2025 0745 by Lola Westbrook RN  Outcome: Progressing     Problem: SAFETY ADULT  Goal: Patient will remain free of falls  Description: INTERVENTIONS:  - Educate patient/family on patient safety including physical limitations  - Instruct patient to call for assistance with activity   - Consider consulting OT/PT to assist with strengthening/mobility based on AM PAC & JH-HLM score  - Consult OT/PT to assist with strengthening/mobility   - Keep Call bell within reach  - Keep bed low and locked with side rails adjusted as appropriate  - Keep care items and personal belongings within reach  - Initiate and maintain comfort rounds  - Apply yellow socks and bracelet for high fall risk patients  - Consider moving patient to room near nurses station  7/4/2025 1354 by Lola Westbrook RN  Outcome: Adequate for Discharge  7/4/2025 0745 by Lola Westbrook RN  Outcome: Progressing     Problem: DISCHARGE PLANNING  Goal: Discharge to home or other facility with appropriate resources  Description: INTERVENTIONS:  - Identify barriers to discharge w/patient and caregiver  - Arrange for needed discharge resources and transportation as appropriate  - Identify discharge learning needs (meds, wound care, etc.)  - Arrange for interpretive services to assist at discharge as needed  - Refer to Case Management Department for coordinating discharge  planning if the patient needs post-hospital services based on physician/advanced practitioner order or complex needs related to functional status, cognitive ability, or social support system  7/4/2025 1354 by Lola Westbrook RN  Outcome: Adequate for Discharge  7/4/2025 0745 by Lola Westbrook RN  Outcome: Progressing

## 2025-07-04 NOTE — ASSESSMENT & PLAN NOTE
HD MWF recently dialyzing MWTF due to volume overload.  10 kg weight gain since ongoing access issue  CXR completed. Final read still pending. On my read, there is evidence of volume overload  Fortunately his respiratory status is stable.  O2 sat 99% on room air  Continue fluid restriction and renal diet  Dialysis attempted 7/3 with little success.  Dialysis performed on 7/4 was more successful  Plan:  Continue dialysis as per instructions from dialysis team. Currently scheduled as MWF

## 2025-07-04 NOTE — DISCHARGE SUMMARY
Discharge Summary - Hospitalist   Name: Joaquin Frankel 45 y.o. male I MRN: [id number]  Unit/Bed#: [facility]-01 I Date of Admission: 7/3/2025   Date of Service: 7/4/2025 I Hospital Day: 1     Assessment & Plan  Clotted dialysis access (HCC)  Thrombosed LUE AV graft and now with nonfunctioning tunneled HD catheter. Having ongoing issues with recurrent HD catheter malfunction and thrombosis requiring multiple catheter exchanges most recently 7/2/2025.  Unable to access HD catheter today sent to ED from dialysis  Suspect coagulopathy contributing to recurrent thrombosis.  He has Eliquis ordered as outpatient to start.    IR intervention 7/3 restored flow to blocked catheter  Plan:  Start Eliquis 5 mg twice a day  Outpatient thrombectomy scheduled for 7/9. Contact interventional team in advance of procedure to ensure appropriate planning for Eliquis preprocedure.  Volume overload  HD MWF recently dialyzing MWTF due to volume overload.  10 kg weight gain since ongoing access issue  CXR completed. Final read still pending. On my read, there is evidence of volume overload  Fortunately his respiratory status is stable.  O2 sat 99% on room air  Continue fluid restriction and renal diet  Dialysis attempted 7/3 with little success.  Dialysis performed on 7/4 was more successful  Plan:  Continue dialysis as per instructions from dialysis team. Currently scheduled as [facility]  ESRD (end stage renal disease) on dialysis ([facility])  Lab Results   Component Value Date    EGFR 6 07/04/2025    EGFR 6 07/03/2025    EGFR 6 06/18/2025    CREATININE 8.72 (H) 07/04/2025    CREATININE 9.42 (H) 07/03/2025    CREATININE 8.39 (H) 06/18/2025   See above  Patient was on HD since last year  Primary hypertension  Continue Coreg and Imdur  History of pulmonary embolism  Patient completed 6 months of Coumadin until April this year.  He reported no vascular access issue while on Coumadin.  Plan to start Eliquis after IR intervention/see above  Morbid obesity  ([facility])  Planning to initiate Wegovy as outpatient     Medical Problems       Resolved Problems  Date Reviewed: 5/19/2025   None       Discharging Physician / Practitioner: Jillian Pacheco MD  PCP: Hany Mcfarland DO  Admission Date:   Admission Orders (From admission, onward)       Ordered        07/03/25 1047  INPATIENT ADMISSION  Once                          Discharge Date: 07/04/25    Next Steps for Physician/AP Assuming Care:  Thrombectomy of thrombosed AV graft wound, as planned.    Test Results Pending at Discharge (will require follow up):  None    Medication Changes for Discharge & Rationale:   Eliquis 5 mg twice a day was started.  This medication had previously been prescribed by an outside provider but had not yet been started by patient.  Given strong history of clotting, we started the Eliquis inpatient.  See after visit summary for reconciled discharge medications provided to patient and/or family.     Consultations During Hospital Stay:  Nephrology    Procedures Performed:   Hemodialysis  IR intervention to reopen dialysis catheter    Significant Findings / Test Results:   None    Incidental Findings:   None    Hospital Course:   Joaquin Frankel is a 45 y.o. male patient who originally presented to the hospital on 7/3/2025 due to blocked dialysis catheter.  He was at dialysis and they could not achieve good access so he was sent to the hospital.  Once he got to the hospital an IR procedure to reopen the catheter was done.  He was also started on heparin.  Once the catheter was open, dialysis was attempted on 7/3 in the evening.  It was not successful.  Dialysis was reattempted on 7/4 with greater success.  Patient was started on Eliquis, which was prescribed but not started as outpatient.  Patient has scheduled thrombectomy and AV graft on 7 9.  Patient is safe for discharge.        The patient, initially admitted to the hospital as inpatient, was discharged earlier than expected given the following:  correction of blockage in catheter from IR.  Please see above list of diagnoses and related plan for additional information.     Discharge Day Visit / Exam:   Subjective: Patient agrees that starting Eliquis is a good idea.  He states that when he was on warfarin in the past he felt like his catheters did not clot as much.  Now that he is not on warfarin he feels like they they clot more.  He does not believe that there is any family history of clotting disorder.  He is understandably frustrated by the repeated issues with the catheters.  He is eager to go home.  He states that he has chronic lymphedema of the left leg and that it is currently worse than it normally is, with it feeling tight.  Vitals: Blood Pressure: (!) 141/43 (25 1300)  Pulse: 73 (25 1230)  Temperature: 98.3 °F (36.8 °C) (25 0658)  Temp Source: Oral (25 0658)  Respirations: 16 (25 1230)  SpO2: 96 % (25 0658)  Physical Exam  Vitals and nursing note reviewed.   Constitutional:       General: He is not in acute distress.     Appearance: He is well-developed.   HENT:      Head: Normocephalic and atraumatic.     Eyes:      Conjunctiva/sclera: Conjunctivae normal.       Cardiovascular:      Rate and Rhythm: Normal rate and regular rhythm.      Heart sounds: No murmur heard.     Comments: Patient refused exam on l leg  Pulmonary:      Effort: Pulmonary effort is normal. No respiratory distress.      Breath sounds: Rhonchi present.   Abdominal:      Palpations: Abdomen is soft.      Tenderness: There is no abdominal tenderness.     Musculoskeletal:         General: No swelling.      Cervical back: Neck supple.      Right lower le+ Edema present.      Left lower leg: Edema present.   Lymphadenopathy:      Comments: Chronic lymphedema of L leg     Skin:     General: Skin is warm and dry.     Neurological:      Mental Status: He is alert.     Psychiatric:         Mood and Affect: Mood normal.          Discharge  instructions/Information to patient and family:   See after visit summary for information provided to patient and family.      Provisions for Follow-Up Care:  See after visit summary for information related to follow-up care and any pertinent home health orders.      Mobility at time of Discharge:   Basic Mobility Inpatient Raw Score: 23  JH-HLM Goal: 7: Walk 25 feet or more  JH-HLM Achieved: 7: Walk 25 feet or more  HLM Goal achieved. Continue to encourage appropriate mobility.     Disposition:   Home    Planned Readmission: No    Administrative Statements   Discharge Statement:  I have spent a total time of 30 minutes in caring for this patient on the day of the visit/encounter. .    **Please Note: This note may have been constructed using a voice recognition system**

## 2025-07-04 NOTE — PLAN OF CARE
Problem: METABOLIC, FLUID AND ELECTROLYTES - ADULT  Goal: Electrolytes maintained within normal limits  Description: INTERVENTIONS:  - Monitor labs and assess patient for signs and symptoms of electrolyte imbalances  - Administer electrolyte replacement as ordered  - Monitor response to electrolyte replacements, including repeat lab results as appropriate  - Instruct patient on fluid and nutrition as appropriate  Outcome: Progressing  Goal: Fluid balance maintained  Description: INTERVENTIONS:  - Monitor labs   - Monitor I/O and WT  - Instruct patient on fluid and nutrition as appropriate  - Assess for signs & symptoms of volume excess or deficit  Outcome: Progressing     Problem: PAIN - ADULT  Goal: Verbalizes/displays adequate comfort level or baseline comfort level  Description: Interventions:  - Encourage patient to monitor pain and request assistance  - Assess pain using appropriate pain scale  - Administer analgesics as ordered based on type and severity of pain and evaluate response  - Implement non-pharmacological measures as appropriate and evaluate response  - Consider cultural and social influences on pain and pain management  - Notify physician/advanced practitioner if interventions unsuccessful or patient reports new pain  - Educate patient/family on pain management process including their role and importance of  reporting pain   - Provide non-pharmacologic/complimentary pain relief interventions  Outcome: Progressing     Problem: INFECTION - ADULT  Goal: Absence or prevention of progression during hospitalization  Description: INTERVENTIONS:  - Assess and monitor for signs and symptoms of infection  - Monitor lab/diagnostic results  - Monitor all insertion sites, i.e. indwelling lines, tubes, and drains  - Monitor endotracheal if appropriate and nasal secretions for changes in amount and color  - [place] appropriate cooling/warming therapies per order  - Administer medications as ordered  -  Instruct and encourage patient and family to use good hand hygiene technique  - Identify and instruct in appropriate isolation precautions for identified infection/condition  Outcome: Progressing     Problem: SAFETY ADULT  Goal: Patient will remain free of falls  Description: INTERVENTIONS:  - Educate patient/family on patient safety including physical limitations  - Instruct patient to call for assistance with activity   - Consider consulting OT/PT to assist with strengthening/mobility based on AM PAC & JH-HLM score  - Consult OT/PT to assist with strengthening/mobility   - Keep Call bell within reach  - Keep bed low and locked with side rails adjusted as appropriate  - Keep care items and personal belongings within reach  - Initiate and maintain comfort rounds  - Make Fall Risk Sign visible to staff  - Apply yellow socks and bracelet for high fall risk patients  - Consider moving patient to room near nurses station  Outcome: Progressing     Problem: DISCHARGE PLANNING  Goal: Discharge to home or other facility with appropriate resources  Description: INTERVENTIONS:  - Identify barriers to discharge w/patient and caregiver  - Arrange for needed discharge resources and transportation as appropriate  - Identify discharge learning needs (meds, wound care, etc.)  - Arrange for interpretive services to assist at discharge as needed  - Refer to Case Management Department for coordinating discharge planning if the patient needs post-hospital services based on physician/advanced practitioner order or complex needs related to functional status, cognitive ability, or social support system  Outcome: Progressing

## 2025-07-04 NOTE — PLAN OF CARE
Goal: Fluid balance maintained  Description: INTERVENTIONS:  - Monitor labs   - Monitor I/O and WT  - Instruct patient on fluid and nutrition as appropriate  - Assess for signs & symptoms of volume excess or deficit  Outcome: Progressing     Problem: PAIN - ADULT  Goal: Verbalizes/displays adequate comfort level or baseline comfort level  Description: Interventions:  - Encourage patient to monitor pain and request assistance  - Assess pain using appropriate pain scale  - Administer analgesics as ordered based on type and severity of pain and evaluate response  - Implement non-pharmacological measures as appropriate and evaluate response  - Consider cultural and social influences on pain and pain management  - Notify physician/advanced practitioner if interventions unsuccessful or patient reports new pain  - Educate patient/family on pain management process including their role and importance of  reporting pain   - Provide non-pharmacologic/complimentary pain relief interventions  Outcome: Progressing     Problem: INFECTION - ADULT  Goal: Absence or prevention of progression during hospitalization  Description: INTERVENTIONS:  - Assess and monitor for signs and symptoms of infection  - Monitor lab/diagnostic results  - Monitor all insertion sites, i.e. indwelling lines, tubes, and drains  - Monitor endotracheal if appropriate and nasal secretions for changes in amount and color  - [place] appropriate cooling/warming therapies per order  - Administer medications as ordered  - Instruct and encourage patient and family to use good hand hygiene technique  - Identify and instruct in appropriate isolation precautions for identified infection/condition  Outcome: Progressing     Problem: SAFETY ADULT  Goal: Patient will remain free of falls  Description: INTERVENTIONS:  - Educate patient/family on patient safety including physical limitations  - Instruct patient to call for assistance with activity   - Consider consulting  OT/PT to assist with strengthening/mobility based on AM PAC & JH-HLM score  - Consult OT/PT to assist with strengthening/mobility   - Keep Call bell within reach  - Keep bed low and locked with side rails adjusted as appropriate  - Keep care items and personal belongings within reach  - Initiate and maintain comfort rounds  - Make Fall Risk Sign visible to staff  - Offer Toileting every  Hours, in advance of need  - Initiate/Maintain alarm  - Obtain necessary fall risk management equipment:   - Apply yellow socks and bracelet for high fall risk patients  - Consider moving patient to room near nurses station  Outcome: Progressing     Problem: DISCHARGE PLANNING  Goal: Discharge to home or other facility with appropriate resources  Description: INTERVENTIONS:  - Identify barriers to discharge w/patient and caregiver  - Arrange for needed discharge resources and transportation as appropriate  - Identify discharge learning needs (meds, wound care, etc.)  - Arrange for interpretive services to assist at discharge as needed  - Refer to Case Management Department for coordinating discharge planning if the patient needs post-hospital services based on physician/advanced practitioner order or complex needs related to functional status, cognitive ability, or social support system  Outcome: Progressing

## 2025-07-04 NOTE — DISCHARGE INSTR - AVS FIRST PAGE
Dear Joaquin Frankel,     It was our pleasure to care for you here at [facility].  It is our hope that we were always able to exceed the expected standards for your care during your stay.  You were hospitalized due to blocked HD catheter.  You were cared for on the first floor by Jillian Pacheco MD under the service of Stanley Garsia MD with the [facility] who covers for your primary care physician (PCP), Hany Mcfarland DO, while you were hospitalized.  If you have any questions or concerns related to this hospitalization, you may contact us at .  For follow up as well as any medication refills, we recommend that you follow up with your primary care physician.  A registered nurse will reach out to you by phone within a few days after your discharge to answer any additional questions that you may have after going home.  However, at this time we provide for you here, the most important instructions / recommendations at discharge:     Notable Medication Adjustments -   Start taking Eliquis 5 mg twice a day  Testing Required after Discharge -   None  ** Please contact your PCP to request testing orders for any of the testing recommended here **  Important follow up information -   Please contact the team that will do your AV graft thrombectomy regarding their preference regarding Eliquis pre-procedure  Please a visit schedule with your primary care physician for within a week  Other Instructions -   If dialysis port becomes blocked again, please return to the hospital.  Please review this entire after visit summary as additional general instructions including medication list, appointments, activity, diet, any pertinent wound care, and other additional recommendations from your care team that may be provided for you.      Sincerely,     Jillian Pacheco MD

## 2025-07-05 LAB — MRSA NOSE QL CULT: NORMAL

## 2025-07-07 ENCOUNTER — TRANSITIONAL CARE MANAGEMENT (OUTPATIENT)
Dept: FAMILY MEDICINE CLINIC | Facility: CLINIC | Age: 45
End: 2025-07-07

## 2025-07-07 NOTE — UTILIZATION REVIEW
NOTIFICATION OF ADMISSION DISCHARGE   This is a Notification of Discharge from [facility]. Please be advised that this patient has been discharge from our facility. Below you will find the admission and discharge date and time including the patient’s disposition.   UTILIZATION REVIEW CONTACT:  Utilization Review Assistants  Network Utilization Review Department  Phone: [phone] x carefully listen to the prompts. All voicemails are confidential.  Email: [email or web address]     ADMISSION INFORMATION  PRESENTATION DATE: 7/3/2025  9:23 AM  OBERVATION ADMISSION DATE: N/A  INPATIENT ADMISSION DATE: 7/3/25 10:47 AM   DISCHARGE DATE: 7/4/2025  2:41 PM   DISPOSITION:Home/Self Care    Network Utilization Review Department  ATTENTION: Please call with any questions or concerns to [phone] and carefully listen to the prompts so that you are directed to the right person. All voicemails are confidential.   For Discharge needs, contact Care Management DC Support Team at [phone] opt. 2  Send all requests for admission clinical reviews, approved or denied determinations and any other requests to dedicated fax number below belonging to the campus where the patient is receiving treatment. List of dedicated fax numbers for the Facilities:  FACILITY NAME UR FAX NUMBER   ADMISSION DENIALS (Administrative/Medical Necessity) [phone]   DISCHARGE SUPPORT TEAM ([facility]) [phone]   PARENT CHILD HEALTH (Maternity/NICU/Pediatrics) [phone]   [facility] [phone]   [facility] [phone]   Critical access hospital [phone]   [facility] [phone]   [facility] [phone]   [facility] [phone]   [facility] [phone]   [facility] [phone]   [facility]  [facility] [phone]   [facility] [phone]   [facility] [phone]   [facility] [phone]

## 2025-07-08 ENCOUNTER — TELEPHONE (OUTPATIENT)
Dept: FAMILY MEDICINE CLINIC | Facility: CLINIC | Age: 45
End: 2025-07-08

## 2025-07-08 NOTE — TELEPHONE ENCOUNTER
Called patient to schedule his TCM, he doesn't feel a follow up is necessary but appreciates the phone call.  Arminda Parkinson MA

## 2025-07-09 ENCOUNTER — HOSPITAL ENCOUNTER (OUTPATIENT)
Dept: INTERVENTIONAL RADIOLOGY/VASCULAR | Facility: HOSPITAL | Age: 45
Discharge: HOME | End: 2025-07-09
Attending: STUDENT IN AN ORGANIZED HEALTH CARE EDUCATION/TRAINING PROGRAM | Admitting: STUDENT IN AN ORGANIZED HEALTH CARE EDUCATION/TRAINING PROGRAM
Payer: COMMERCIAL

## 2025-07-09 VITALS
WEIGHT: 315 LBS | HEIGHT: 67 IN | RESPIRATION RATE: 22 BRPM | TEMPERATURE: 99.8 F | SYSTOLIC BLOOD PRESSURE: 140 MMHG | OXYGEN SATURATION: 98 % | HEART RATE: 73 BPM | BODY MASS INDEX: 49.44 KG/M2 | DIASTOLIC BLOOD PRESSURE: 69 MMHG

## 2025-07-09 DIAGNOSIS — T82.9XXA COMPLICATION OF VASCULAR DIALYSIS CATHETER: ICD-10-CM

## 2025-07-09 DIAGNOSIS — N18.6 ESRD (END STAGE RENAL DISEASE) ON DIALYSIS (HCC): ICD-10-CM

## 2025-07-09 DIAGNOSIS — Z99.2 ESRD (END STAGE RENAL DISEASE) ON DIALYSIS (HCC): ICD-10-CM

## 2025-07-09 DIAGNOSIS — T82.49XA CLOTTED DIALYSIS ACCESS (HCC): ICD-10-CM

## 2025-07-09 LAB — GLUCOSE SERPL-MCNC: 131 MG/DL (ref 65–140)

## 2025-07-09 PROCEDURE — 99152 MOD SED SAME PHYS/QHP 5/>YRS: CPT

## 2025-07-09 PROCEDURE — 76937 US GUIDE VASCULAR ACCESS: CPT | Performed by: RADIOLOGY

## 2025-07-09 PROCEDURE — C1757 CATH, THROMBECTOMY/EMBOLECT: HCPCS

## 2025-07-09 PROCEDURE — C1894 INTRO/SHEATH, NON-LASER: HCPCS

## 2025-07-09 PROCEDURE — C1769 GUIDE WIRE: HCPCS

## 2025-07-09 PROCEDURE — 82948 REAGENT STRIP/BLOOD GLUCOSE: CPT

## 2025-07-09 PROCEDURE — 36905 THRMBC/NFS DIALYSIS CIRCUIT: CPT

## 2025-07-09 PROCEDURE — C1725 CATH, TRANSLUMIN NON-LASER: HCPCS

## 2025-07-09 PROCEDURE — C1887 CATHETER, GUIDING: HCPCS

## 2025-07-09 PROCEDURE — 99153 MOD SED SAME PHYS/QHP EA: CPT

## 2025-07-09 PROCEDURE — 36905 THRMBC/NFS DIALYSIS CIRCUIT: CPT | Performed by: RADIOLOGY

## 2025-07-09 RX ORDER — FENTANYL CITRATE 50 UG/ML
INJECTION, SOLUTION INTRAMUSCULAR; INTRAVENOUS AS NEEDED
Status: COMPLETED | OUTPATIENT
Start: 2025-07-09 | End: 2025-07-09

## 2025-07-09 RX ORDER — LIDOCAINE HYDROCHLORIDE 10 MG/ML
INJECTION, SOLUTION EPIDURAL; INFILTRATION; INTRACAUDAL; PERINEURAL AS NEEDED
Status: COMPLETED | OUTPATIENT
Start: 2025-07-09 | End: 2025-07-09

## 2025-07-09 RX ORDER — ONDANSETRON 2 MG/ML
4 INJECTION INTRAMUSCULAR; INTRAVENOUS EVERY 6 HOURS PRN
Status: DISCONTINUED | OUTPATIENT
Start: 2025-07-09 | End: 2025-07-13 | Stop reason: HOSPADM

## 2025-07-09 RX ORDER — HYDROMORPHONE HCL/PF 1 MG/ML
0.5 SYRINGE (ML) INJECTION EVERY 6 HOURS PRN
Status: DISCONTINUED | OUTPATIENT
Start: 2025-07-09 | End: 2025-07-13 | Stop reason: HOSPADM

## 2025-07-09 RX ORDER — SODIUM CHLORIDE 9 MG/ML
50 INJECTION, SOLUTION INTRAVENOUS CONTINUOUS
Status: DISCONTINUED | OUTPATIENT
Start: 2025-07-09 | End: 2025-07-13 | Stop reason: HOSPADM

## 2025-07-09 RX ORDER — OXYCODONE AND ACETAMINOPHEN 5; 325 MG/1; MG/1
1 TABLET ORAL EVERY 4 HOURS PRN
Refills: 0 | Status: DISCONTINUED | OUTPATIENT
Start: 2025-07-09 | End: 2025-07-13 | Stop reason: HOSPADM

## 2025-07-09 RX ORDER — HEPARIN SODIUM 1000 [USP'U]/ML
INJECTION, SOLUTION INTRAVENOUS; SUBCUTANEOUS AS NEEDED
Status: COMPLETED | OUTPATIENT
Start: 2025-07-09 | End: 2025-07-09

## 2025-07-09 RX ORDER — MIDAZOLAM HYDROCHLORIDE 2 MG/2ML
INJECTION, SOLUTION INTRAMUSCULAR; INTRAVENOUS AS NEEDED
Status: COMPLETED | OUTPATIENT
Start: 2025-07-09 | End: 2025-07-09

## 2025-07-09 RX ADMIN — VANCOMYCIN HYDROCHLORIDE 2000 MG: 1 INJECTION, POWDER, LYOPHILIZED, FOR SOLUTION INTRAVENOUS at 11:04

## 2025-07-09 RX ADMIN — SODIUM CHLORIDE 50 ML/HR: 0.9 INJECTION, SOLUTION INTRAVENOUS at 07:40

## 2025-07-09 RX ADMIN — FENTANYL CITRATE 50 MCG: 50 INJECTION, SOLUTION INTRAMUSCULAR; INTRAVENOUS at 09:27

## 2025-07-09 RX ADMIN — FENTANYL CITRATE 50 MCG: 50 INJECTION, SOLUTION INTRAMUSCULAR; INTRAVENOUS at 10:09

## 2025-07-09 RX ADMIN — HEPARIN SODIUM 4000 UNITS: 1000 INJECTION INTRAVENOUS; SUBCUTANEOUS at 09:59

## 2025-07-09 RX ADMIN — HEPARIN SODIUM 3000 UNITS: 1000 INJECTION INTRAVENOUS; SUBCUTANEOUS at 10:40

## 2025-07-09 RX ADMIN — HYDROMORPHONE HYDROCHLORIDE 0.5 MG: 1 INJECTION, SOLUTION INTRAMUSCULAR; INTRAVENOUS; SUBCUTANEOUS at 12:46

## 2025-07-09 RX ADMIN — FENTANYL CITRATE 50 MCG: 50 INJECTION, SOLUTION INTRAMUSCULAR; INTRAVENOUS at 09:04

## 2025-07-09 RX ADMIN — IOHEXOL 140 ML: 350 INJECTION, SOLUTION INTRAVENOUS at 11:58

## 2025-07-09 RX ADMIN — FENTANYL CITRATE 50 MCG: 50 INJECTION, SOLUTION INTRAMUSCULAR; INTRAVENOUS at 09:34

## 2025-07-09 RX ADMIN — FENTANYL CITRATE 50 MCG: 50 INJECTION, SOLUTION INTRAMUSCULAR; INTRAVENOUS at 10:30

## 2025-07-09 RX ADMIN — MIDAZOLAM HYDROCHLORIDE 1 MG: 1 INJECTION, SOLUTION INTRAMUSCULAR; INTRAVENOUS at 10:09

## 2025-07-09 RX ADMIN — MIDAZOLAM HYDROCHLORIDE 1 MG: 1 INJECTION, SOLUTION INTRAMUSCULAR; INTRAVENOUS at 09:46

## 2025-07-09 RX ADMIN — MIDAZOLAM HYDROCHLORIDE 1 MG: 1 INJECTION, SOLUTION INTRAMUSCULAR; INTRAVENOUS at 10:30

## 2025-07-09 RX ADMIN — LIDOCAINE HYDROCHLORIDE 10 ML: 10 INJECTION, SOLUTION EPIDURAL; INFILTRATION; INTRACAUDAL at 09:06

## 2025-07-09 RX ADMIN — MIDAZOLAM HYDROCHLORIDE 1 MG: 1 INJECTION, SOLUTION INTRAMUSCULAR; INTRAVENOUS at 11:01

## 2025-07-09 RX ADMIN — MIDAZOLAM HYDROCHLORIDE 1 MG: 1 INJECTION, SOLUTION INTRAMUSCULAR; INTRAVENOUS at 09:34

## 2025-07-09 RX ADMIN — OXYCODONE HYDROCHLORIDE AND ACETAMINOPHEN 1 TABLET: 5; 325 TABLET ORAL at 13:56

## 2025-07-09 RX ADMIN — FENTANYL CITRATE 50 MCG: 50 INJECTION, SOLUTION INTRAMUSCULAR; INTRAVENOUS at 09:46

## 2025-07-09 RX ADMIN — MIDAZOLAM HYDROCHLORIDE 1 MG: 1 INJECTION, SOLUTION INTRAMUSCULAR; INTRAVENOUS at 09:04

## 2025-07-09 RX ADMIN — FENTANYL CITRATE 50 MCG: 50 INJECTION, SOLUTION INTRAMUSCULAR; INTRAVENOUS at 11:01

## 2025-07-09 RX ADMIN — MIDAZOLAM HYDROCHLORIDE 1 MG: 1 INJECTION, SOLUTION INTRAMUSCULAR; INTRAVENOUS at 09:11

## 2025-07-09 NOTE — BRIEF OP NOTE (RAD/CATH)
IR AV FISTULA/GRAFT DECLOT  Procedure Note    PATIENT NAME: Joaquin Frankel  : 1980  MRN: [id number]     Pre-op Diagnosis:   1. ESRD (end stage renal disease) on dialysis (HCC)      Post-op Diagnosis:   1. ESRD (end stage renal disease) on dialysis (HCC)        Surgeon:   Juan Carlos Almanza DO  Assistants:     No qualified resident was available.    Estimated Blood Loss: None  Findings:   Unsuccessful MIR AVG declot secondary to intermittent rethrombosis of the graft during the procedure, access site bleeding likely due to incomplete graft incorporation, inability for longer procedure due to patient pain. 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     If reattempt, patient will need general anesthesia.     Specimens: none    Complications:  none    Anesthesia: conscious sedation and local    Juan Carlos Almanza DO     Date: 2025  Time: 11:31 AM

## 2025-07-09 NOTE — DISCHARGE INSTRUCTIONS
Fistulogram     Interventional Radiology Phone Number: [phone]  WHAT YOU NEED TO KNOW:   Your arm or leg may be sore, swollen, and bruised after the procedure. This is normal and should get better in a few days.   DISCHARGE INSTRUCTIONS:   Call 911 for any of the following:   You have any of the following signs of a heart attack:    Squeezing, pressure, or pain in your chest that lasts longer than 5 minutes or returns  Discomfort or pain in your back, neck, jaw, stomach, or arm   Trouble breathing  Nausea or vomiting  Lightheadedness or a sudden cold sweat, especially with chest pain or trouble breathing  You have any of the following signs of a stroke:    Numbness or drooping on one side of your face   Weakness in an arm or leg  Confusion or difficulty speaking  Dizziness, a severe headache, or vision loss  You feel lightheaded, short of breath, and have chest pain.   You cough up blood.   You have trouble breathing.  You have bleeding that does not stop after 10 minutes of holding firm, direct pressure over the puncture site.  Seek care immediately if:   Blood soaks through your bandage.  Your hand or foot closest to the graft or fistula feels cold, painful, or numb.   Your hand or foot closest to the graft or fistula is pale or blue.   You have trouble moving your arm or leg closest to the graft or fistula.   Your bruise suddenly gets bigger.  Contact your healthcare provider if:   You have a fever or chills.  Your puncture site is red, swollen, or draining pus.  You have nausea or are vomiting.  Your skin is itchy, swollen, or you have a rash.  You cannot feel a thrill over your graft or fistula.   You have questions or concerns about your condition or care.  Medicines:  You may  need any of the following:  Acetaminophen  decreases pain. It is available without a doctor's order. Ask how much to take and how often to take it. Follow directions. Read the labels of all other medicines you are using to see if  they also contain acetaminophen, or ask your doctor or pharmacist. Acetaminophen can cause liver damage if not taken correctly. Do not take more than 4 grams (4,000 milligrams) of acetaminophen in one day.   Blood thinners help prevent blood clots. Examples of blood thinners include heparin and warfarin. Clots can cause strokes, heart attacks, and death. The following are general safety guidelines to follow while you are taking a blood thinner:  Watch for bleeding and bruising while you take blood thinners. Watch for bleeding from your gums or nose. Watch for blood in your urine and bowel movements. Use a soft washcloth on your skin, and a soft toothbrush to brush your teeth. This can keep your skin and gums from bleeding. If you shave, use an electric shaver. Do not play contact sports.   Tell your dentist and other healthcare providers that you take anticoagulants. Wear a bracelet or necklace that says you take this medicine.   Do not start or stop any medicines unless your healthcare provider tells you to. Many medicines cannot be used with blood thinners.   Tell your healthcare provider right away if you forget to take the medicine, or if you take too much.  Warfarin  is a blood thinner that you may need to take. The following are things you should be aware of if you take warfarin.  Foods and medicines can affect the amount of warfarin in your blood. Do not make major changes to your diet while you take warfarin. Warfarin works best when you eat about the same amount of vitamin K every day. Vitamin K is found in green leafy vegetables and certain other foods. Ask for more information about what to eat when you are taking warfarin.  You will need to see your healthcare provider for follow-up visits when you are on warfarin. You will need regular blood tests. These tests are used to decide how much medicine you need.  Take your medicine as directed.  Call your healthcare provider if you think your medicine is not  helping or if you have side effects. Tell him if you are allergic to any medicine. Keep a list of the medicines, vitamins, and herbs you take. Include the amounts, and when and why you take them. Bring the list or the pill bottles to follow-up visits. Carry your medicine list with you in case of an emergency.  Care for your wound as directed:  Remove the bandage in 4 to 6 hours or as directed. Wash the area once a day with soap and water. Gently pat the area dry.   Self-care:   Apply firm, steady pressure to the puncture site if it bleeds.  Use a clean gauze or towel to hold pressure for 10 to 15 minutes. Call 911 if you cannot stop the bleeding or the bleeding gets heavier.   Feel for a thrill once a day or as directed.  Place your index and second finger over your fistula or graft as directed. You should feel a vibration. The vibration means that blood is flowing through your graft or fistula correctly.   Rest your arm or leg as directed.  Do not lift anything heavier than 5 pounds or do strenuous activity for 24 hours.   Prevent damage to your graft or fistula.  Do not wear tight-fitting clothing over your graft or fistula. Do not wear tight jewelry on the arm or leg with the graft or fistula. Tell healthcare providers not to do, IVs, blood draws, and blood pressure readings in the arm with your graft or fistula. Do not allow flu shots or vaccinations in your arm with your graft or fistula.  Follow up with your healthcare provider as directed:  Write down your questions so you remember to ask them during your visits.     © 2016 [employer] Information is for End User's use only and may not be sold, redistributed or otherwise used for commercial purposes. All illustrations and images included in CareNotes® are the copyrighted property of [employer] or [employer].  The above information is an  only. It is not intended as medical advice for individual conditions or  treatments. Talk to your doctor, nurse or pharmacist before following any medical regimen to see if it is safe and effective for you.

## 2025-07-09 NOTE — INTERVAL H&P NOTE
"H&P reviewed. After examining the patient, I find no changed to the H&P since it had been written.    /69   Pulse 84   Temp 98.4 °F (36.9 °C) (Temporal)   Resp 18   Ht 5' 7\" (1.702 m)   Wt (!) 175 kg (385 lb 1.6 oz)   SpO2 97%   BMI 60.32 kg/m²       Patient re-evaluated. Accept as history and physical.    Juan Carlos Almanza, DO/July 9, 2025/8:54 AM  " [facility]  PHYSICAL MEDICINE & REHABILITATION  SKILLED NURSING NOTE    Reason for visit: Evaluation to provide specialty care in Physical Medicine and Rehabilitation.     Referring physician: Dr. Indra Castro  Supervising physician: Dr. Jacqueline Bruno      Facility: [facility]  Admitted from: [facility]  Admitted on: 7/15/2023       History of Present Illness   Patient is 77 year old female who was admitted to skilled nursing with with PMH of HTN, moderate AS, epilepsy, dementia, anxiety disorder, T2DM, and severe hypothyroidism.    Patient has had multiple hospitalizations since December 28, 2022.  She was hospitalized at [facility] until 2/3/2023 after she had a cardiac arrest and acute hypoxic respiratory failure and septic shock secondary to foreign body aspiration and HAP.  She was intubated and required pressors in the ICU.  She was found to have RUE DVT and was started on Eliquis.  She underwent trach and PEG placement on 1/19/2023.  She had anoxic encephalopathy.  She was transferred to SNF.    Her most recent hospitalizations included:  [facility] 6/7 to 6/14 for VAP and shock  [facility] [place] [place] 6/21 to 6/27 for sepsis due to sacral decubitus ulcer  [facility] [place] [place] 7/2 to 7/15 for sacral wound debridement with axiofill graft placement and wound VAC application on 7/10 and laparoscopic lysis of adhesion, creation of sigmoid colostomy on 7/12.  All procedures were performed by Dr. Bobo Zhu     Unclear when patient was started on HD due to ESRD.    Previous (prior) functional status:  Unknown prior level of function prior to cardiac arrest in December 2022    Current functional status:   7/28:  Dependent for transfers with Shobha lift  Dependent for ADLs         Review of Systems  Review of Systems   Unable to perform ROS: Patient nonverbal   HENT:        Dysphagia   Genitourinary:        On dialysis   Skin: Positive for wound.         Past medical  history  Past Medical History:   Diagnosis Date   • Tracheostomy in place (CMD)     Initial trach tube placed 01-19-23         Family History  No family history on file.    Social History   Social History     Tobacco Use   • Smoking status: Never     Passive exposure: Never   • Smokeless tobacco: Never   Substance Use Topics   • Alcohol use: Never   • Drug use: Never        Vitals:  Reviewed,  Stable  and  Afebrile.     PHYSICAL EXAM  Physical Exam  Constitutional:       Comments: Lethargic but easily arousable   HENT:      Head: Normocephalic.   Eyes:      Comments: Opens her eyes but does not track   Neck:      Comments: Tracheostomy to vent  Cardiovascular:      Rate and Rhythm: Normal rate.   Pulmonary:      Effort: Pulmonary effort is normal. No respiratory distress.   Abdominal:      General: There is no distension.      Palpations: Abdomen is soft.      Comments: G-tube  Colostomy is leaking   Musculoskeletal:      Right lower leg: No edema.      Left lower leg: No edema.      Comments: Has flexion contractures in bilateral upper and bilateral lower extremities  Bilateral heel boots in place   Skin:     Comments: Per wound RN notes:  Unstageable left lateral ankle wound  Stage IV sacral wound   Neurological:      Comments: Does not move any of her extremities volitionally  Does not follow commands             Assessment  Gait abnormality  PT and OT    Anoxic encephalopathy due to cardiac arrest    Tracheostomy in place    Flexion contractures  PT and OT to work on passive range of motion    Unstageable left lateral ankle wound  Stage IV sacral wound  Wound RN is following  Low-air-loss mattress  Turn every 2 hours  Bilateral heel boots in place    Dysphagia  Continue with tube feedings via G-tube  Aspiration precautions    ESRD on HD  Nephrology managing    Plan  Continue with PT and OT.  Patient is appropriate for a continued course of subacute rehabilitation.  Goals are to prevent further contractures,  prevent further pressure ulcers, and prevent aspiration pneumonia.  Patient will likely require 24-hour caregiving assistance at discharge.  Thank you very much for allowing us to participate in this patient's care.      Time:  Total time spent regarding patient: 35 minutes   In reviewing medical records    Indigo Vieyra CNP  [facility] Physical Medicine and Rehabilitation  [phone]

## 2025-07-09 NOTE — SEDATION DOCUMENTATION
IR LUE fistulagram performed by Dr. Almanza. Patient unable to tolerate procedure further without increasing medication requirements. Patient to be rescheduled with anesthesia. Woggles to sites. Report given to RN. Bedrest start time:1130.

## 2025-07-10 DIAGNOSIS — N18.6 ESRD (END STAGE RENAL DISEASE) ON DIALYSIS (HCC): Primary | ICD-10-CM

## 2025-07-10 DIAGNOSIS — T82.848A PAIN FROM A/V FISTULA (HCC): ICD-10-CM

## 2025-07-10 DIAGNOSIS — T82.49XA CLOTTED DIALYSIS ACCESS, INITIAL ENCOUNTER (HCC): ICD-10-CM

## 2025-07-10 DIAGNOSIS — Z99.2 ESRD (END STAGE RENAL DISEASE) ON DIALYSIS (HCC): Primary | ICD-10-CM

## 2025-07-10 RX ORDER — OXYCODONE AND ACETAMINOPHEN 5; 325 MG/1; MG/1
1 TABLET ORAL EVERY 8 HOURS PRN
Qty: 15 TABLET | Refills: 0 | Status: SHIPPED | OUTPATIENT
Start: 2025-07-10 | End: 2025-07-15

## 2025-07-11 ENCOUNTER — TELEMEDICINE (OUTPATIENT)
Dept: VASCULAR SURGERY | Facility: CLINIC | Age: 45
End: 2025-07-11

## 2025-07-11 DIAGNOSIS — D63.1 ANEMIA IN ESRD (END-STAGE RENAL DISEASE)  (HCC): Primary | ICD-10-CM

## 2025-07-11 DIAGNOSIS — N18.6 ANEMIA IN ESRD (END-STAGE RENAL DISEASE)  (HCC): Primary | ICD-10-CM

## 2025-07-11 DIAGNOSIS — Z99.2 ESRD (END STAGE RENAL DISEASE) ON DIALYSIS (HCC): ICD-10-CM

## 2025-07-11 DIAGNOSIS — N18.6 ESRD (END STAGE RENAL DISEASE) ON DIALYSIS (HCC): ICD-10-CM

## 2025-07-11 PROCEDURE — 99024 POSTOP FOLLOW-UP VISIT: CPT | Performed by: SURGERY

## 2025-07-11 NOTE — PROGRESS NOTES
Virtual Brief Visit  Name: Joaquin Frankel      : 1980      MRN: [id number]  Encounter Provider: Scout James DO  Encounter Date: 2025   Encounter department: [facility]  :  Assessment & Plan  Anemia in ESRD (end-stage renal disease)  ([facility])  Lab Results   Component Value Date    EGFR 6 2025    EGFR 6 2025    EGFR 6 2025    CREATININE 8.72 (H) 2025    CREATININE 9.42 (H) 2025    CREATININE 8.39 (H) 2025       Orders:    IR AV fistula/graft declot; Future    ESRD (end stage renal disease) on dialysis ([facility])  Lab Results   Component Value Date    EGFR 6 2025    EGFR 6 2025    EGFR 6 2025    CREATININE 8.72 (H) 2025    CREATININE 9.42 (H) 2025    CREATININE 8.39 (H) 2025            Anemia in ESRD (end-stage renal disease)  ([facility])  Lab Results   Component Value Date    EGFR 6 2025    EGFR 6 2025    EGFR 6 2025    CREATININE 8.72 (H) 2025    CREATININE 9.42 (H) 2025    CREATININE 8.39 (H) 2025     Other orders    Nursing communication Apply gown prior to procedure; Standing    Have Patient Void On Call to Procedure Room; Standing    Insert and Maintain IV; Standing    ESRD (end stage renal disease) on dialysis ([facility])  Lab Results   Component Value Date    EGFR 6 2025    EGFR 6 2025    EGFR 6 2025    CREATININE 8.72 (H) 2025    CREATININE 9.42 (H) 2025    CREATININE 8.39 (H) 2025         45-year-old male on dialysis well-known to me for access.  Most recently he had a left upper arm AV graft that occluded early he was referred for declot after period of time for incorporation of his graft.  This was done 2 days ago.  I spoke with the interventional radiology team who performed the procedure it did appear that there was not complete incorporation of the graft at the time of the procedure and the patient had significant pain after a very long and  technically challenging procedure they were not able to ultimately intervene on the graft I spoke with him this afternoon to discuss further options with him further options that I described included another attempt at declot this time with general anesthesia and allowing another 4 to 6 weeks to pass prior to attempting to do so to allow for complete graft incorporation.  It does appear that there is a kink at the venous anastomosis likely due to his body habitus ultimately this was able to be crossed by the interventional radiologist and if this could be stented this may result in graft with reasonable patency.  I did also offer him a new right upper extremity access creation.  Based on his prior mapping he did have an excellent size basilic vein in the right upper arm the right side is his dominant arm.  After discussing these options with him he wishes to proceed with another attempt at salvaging his existing graft.  I will communicate with Dr. Lucas from interventional radiology about doing this in 4 to 6 weeks and I will also request a right upper extremity venogram at the time that this is done.  Other orders    Nursing communication Apply gown prior to procedure; Standing    Have Patient Void On Call to Procedure Room; Standing    Insert and Maintain IV; Standing          History of Present Illness   HPI    Administrative Statements   Encounter provider Scout James, DO    The Patient is located at Home and in the following state in which I hold an active license [place].    The patient was identified by name and date of birth. Joaquin Frankel was informed that this is a telemedicine visit and that the visit is being conducted through the [employer] Teams platform. He agrees to proceed..  My office door was closed. No one else was in the room.  He acknowledged consent and understanding of privacy and security of the video platform. The patient has agreed to participate and understands they can discontinue the  visit at any time.    I have spent a total time of 15 minutes in caring for this patient on the day of the visit/encounter including Reviewing/placing orders in the medical record (including tests, medications, and/or procedures), not including the time spent for establishing the audio/video connection.

## 2025-07-11 NOTE — ASSESSMENT & PLAN NOTE
Lab Results   Component Value Date    EGFR 6 07/04/2025    EGFR 6 07/03/2025    EGFR 6 06/18/2025    CREATININE 8.72 (H) 07/04/2025    CREATININE 9.42 (H) 07/03/2025    CREATININE 8.39 (H) 06/18/2025         45-year-old male on dialysis well-known to me for access.  Most recently he had a left upper arm AV graft that occluded early he was referred for declot after period of time for incorporation of his graft.  This was done 2 days ago.  I spoke with the interventional radiology team who performed the procedure it did appear that there was not complete incorporation of the graft at the time of the procedure and the patient had significant pain after a very long and technically challenging procedure they were not able to ultimately intervene on the graft I spoke with him this afternoon to discuss further options with him further options that I described included another attempt at declot this time with general anesthesia and allowing another 4 to 6 weeks to pass prior to attempting to do so to allow for complete graft incorporation.  It does appear that there is a kink at the venous anastomosis likely due to his body habitus ultimately this was able to be crossed by the interventional radiologist and if this could be stented this may result in graft with reasonable patency.  I did also offer him a new right upper extremity access creation.  Based on his prior mapping he did have an excellent size basilic vein in the right upper arm the right side is his dominant arm.  After discussing these options with him he wishes to proceed with another attempt at salvaging his existing graft.  I will communicate with Dr. Lucas from interventional radiology about doing this in 4 to 6 weeks and I will also request a right upper extremity venogram at the time that this is done.

## 2025-07-11 NOTE — ASSESSMENT & PLAN NOTE
Lab Results   Component Value Date    EGFR 6 07/04/2025    EGFR 6 07/03/2025    EGFR 6 06/18/2025    CREATININE 8.72 (H) 07/04/2025    CREATININE 9.42 (H) 07/03/2025    CREATININE 8.39 (H) 06/18/2025       Orders:    IR AV fistula/graft declot; Future

## 2025-07-11 NOTE — ASSESSMENT & PLAN NOTE
Lab Results   Component Value Date    EGFR 6 07/04/2025    EGFR 6 07/03/2025    EGFR 6 06/18/2025    CREATININE 8.72 (H) 07/04/2025    CREATININE 9.42 (H) 07/03/2025    CREATININE 8.39 (H) 06/18/2025

## 2025-07-24 ENCOUNTER — TELEPHONE (OUTPATIENT)
Dept: RADIOLOGY | Facility: HOSPITAL | Age: 45
End: 2025-07-24

## 2025-07-24 NOTE — PRE-PROCEDURE INSTRUCTIONS
Pre-procedure Instructions for Interventional Radiology  [address] [address]  INTERVENTIONAL RADIOLOGY [phone]    You are scheduled for a/an fistulagram/declot.    On Friday 8-1-25.    Your tentative arrival time is 8:30am.  Short stay will notify you the day before your procedure with the exact arrival time and the location to arrive.    To prepare for your procedure:  Please arrange for someone to drive you home after the procedure and stay with you until the next morning if you are instructed to do so.  This is typically for patients receiving some type of sedative or anesthetic for the procedure.  DO NOT EAT OR DRINK ANYTHING after midnight on the evening before your procedure including candy & gum.  ONLY SIPS OF WATER with your medications are allowed on the morning of your procedure.  TAKE ALL OF YOUR REGULAR MEDICATIONS THE MORNING OF YOUR PROCEDURE with sips of water!  We may call you to stop some of your blood sugar, blood pressure and blood thinning medications depending on the procedure.  Please take all of these medications unless we instruct you to stop them.  If you have an allergy to x-ray dye, please contact Interventional Radiology for an x-ray dye preparation which usually consists of an oral steroid and Benadryl.  If you wear a Glucose Monitor, you may be asked to remove it for your procedure if we are using x-ray.  These devices need to be removed when we are imaging with x-ray near the device since the radiation can cause the unit to malfunction.  If possible and not too inconvenient, you may want to schedule your exam closer to day 14 of your 14 day device so your device is not wasted.    The day of your procedure:  Bring a list of the medications you take at home.  Bring medications you take for breathing problems (such as inhalers), medications for chest pain, or both.  Bring a case for your glasses or contacts.  Bring your insurance card and  a form of photo ID.  Please leave all valuables such as credit cards and jewelry at home.  Report to the admitting office to the left of the registration desk in the main lobby at the [facility], Entrance B.  You will then be directed to the Short Stay Center.  While your procedure is being performed, your family may wait in the Radiology Waiting Room on the 1st floor in Radiology.  if they need to leave, they may provide a number to be called following the procedure.   Be prepared to stay overnight just in case. Sometimes procedures will indicate the need for further observation or treatment.   If you are scheduled for a follow-up visit with the Interventional Radiologist after your procedure, you will be called with a date and time.    Special Instructions (Medications to stop taking before your procedure etc.):  Hold Demadex the morning of the procedure.  Do not start Wegovy until after the procedure day.

## 2025-08-01 ENCOUNTER — APPOINTMENT (OUTPATIENT)
Dept: RADIOLOGY | Facility: HOSPITAL | Age: 45
End: 2025-08-01
Payer: COMMERCIAL

## 2025-08-01 ENCOUNTER — ANESTHESIA (OUTPATIENT)
Dept: RADIOLOGY | Facility: HOSPITAL | Age: 45
End: 2025-08-01
Payer: COMMERCIAL

## 2025-08-01 ENCOUNTER — ANESTHESIA EVENT (OUTPATIENT)
Dept: RADIOLOGY | Facility: HOSPITAL | Age: 45
End: 2025-08-01
Payer: COMMERCIAL

## 2025-08-01 ENCOUNTER — ANTICOAG VISIT (OUTPATIENT)
Dept: FAMILY MEDICINE CLINIC | Facility: CLINIC | Age: 45
End: 2025-08-01

## 2025-08-01 ENCOUNTER — HOSPITAL ENCOUNTER (OUTPATIENT)
Dept: RADIOLOGY | Facility: HOSPITAL | Age: 45
Setting detail: OBSERVATION
Discharge: HOME | End: 2025-08-02
Attending: SURGERY | Admitting: INTERNAL MEDICINE
Payer: COMMERCIAL

## 2025-08-01 DIAGNOSIS — T82.49XA CLOTTED DIALYSIS ACCESS, INITIAL ENCOUNTER (HCC): ICD-10-CM

## 2025-08-01 DIAGNOSIS — Z99.2 ESRD (END STAGE RENAL DISEASE) ON DIALYSIS (HCC): Primary | ICD-10-CM

## 2025-08-01 DIAGNOSIS — N18.6 ESRD (END STAGE RENAL DISEASE) ON DIALYSIS (HCC): Primary | ICD-10-CM

## 2025-08-01 DIAGNOSIS — D63.1 ANEMIA IN ESRD (END-STAGE RENAL DISEASE)  (HCC): ICD-10-CM

## 2025-08-01 DIAGNOSIS — N18.6 ANEMIA IN ESRD (END-STAGE RENAL DISEASE)  (HCC): ICD-10-CM

## 2025-08-01 DIAGNOSIS — I26.99 ACUTE PULMONARY EMBOLISM, UNSPECIFIED PULMONARY EMBOLISM TYPE, UNSPECIFIED WHETHER ACUTE COR PULMONALE PRESENT (HCC): Primary | ICD-10-CM

## 2025-08-01 PROBLEM — R09.02 HYPOXIA: Status: ACTIVE | Noted: 2021-12-04

## 2025-08-01 LAB
ANION GAP SERPL CALCULATED.3IONS-SCNC: 14 MMOL/L (ref 4–13)
BUN SERPL-MCNC: 44 MG/DL (ref 5–25)
CALCIUM SERPL-MCNC: 9 MG/DL (ref 8.4–10.2)
CHLORIDE SERPL-SCNC: 94 MMOL/L (ref 96–108)
CO2 SERPL-SCNC: 26 MMOL/L (ref 21–32)
CREAT SERPL-MCNC: 7.12 MG/DL (ref 0.6–1.3)
ERYTHROCYTE [DISTWIDTH] IN BLOOD BY AUTOMATED COUNT: 14.2 % (ref 11.6–15.1)
GFR SERPL CREATININE-BSD FRML MDRD: 8 ML/MIN/1.73SQ M
GLUCOSE P FAST SERPL-MCNC: 172 MG/DL (ref 65–99)
GLUCOSE SERPL-MCNC: 172 MG/DL (ref 65–140)
HCT VFR BLD AUTO: 30.1 % (ref 36.5–49.3)
HGB BLD-MCNC: 10 G/DL (ref 12–17)
INR PPP: 1.11 (ref 0.85–1.19)
MCH RBC QN AUTO: 31.4 PG (ref 26.8–34.3)
MCHC RBC AUTO-ENTMCNC: 33.2 G/DL (ref 31.4–37.4)
MCV RBC AUTO: 95 FL (ref 82–98)
PLATELET # BLD AUTO: 276 THOUSANDS/UL (ref 149–390)
PMV BLD AUTO: 9 FL (ref 8.9–12.7)
POTASSIUM SERPL-SCNC: 3.8 MMOL/L (ref 3.5–5.3)
PROTHROMBIN TIME: 14.6 SECONDS (ref 12.3–15)
RBC # BLD AUTO: 3.18 MILLION/UL (ref 3.88–5.62)
SODIUM SERPL-SCNC: 134 MMOL/L (ref 135–147)
WBC # BLD AUTO: 10.51 THOUSAND/UL (ref 4.31–10.16)

## 2025-08-01 PROCEDURE — G0379 DIRECT REFER HOSPITAL OBSERV: HCPCS

## 2025-08-01 PROCEDURE — C1757 CATH, THROMBECTOMY/EMBOLECT: HCPCS

## 2025-08-01 PROCEDURE — 85027 COMPLETE CBC AUTOMATED: CPT | Performed by: SURGERY

## 2025-08-01 PROCEDURE — 85610 PROTHROMBIN TIME: CPT | Performed by: SURGERY

## 2025-08-01 PROCEDURE — C1894 INTRO/SHEATH, NON-LASER: HCPCS

## 2025-08-01 PROCEDURE — C1874 STENT, COATED/COV W/DEL SYS: HCPCS

## 2025-08-01 PROCEDURE — C1725 CATH, TRANSLUMIN NON-LASER: HCPCS

## 2025-08-01 PROCEDURE — C1887 CATHETER, GUIDING: HCPCS

## 2025-08-01 PROCEDURE — 76937 US GUIDE VASCULAR ACCESS: CPT | Performed by: STUDENT IN AN ORGANIZED HEALTH CARE EDUCATION/TRAINING PROGRAM

## 2025-08-01 PROCEDURE — C1769 GUIDE WIRE: HCPCS

## 2025-08-01 PROCEDURE — 80048 BASIC METABOLIC PNL TOTAL CA: CPT | Performed by: SURGERY

## 2025-08-01 PROCEDURE — 36906 THRMBC/NFS DIALYSIS CIRCUIT: CPT

## 2025-08-01 PROCEDURE — 71045 X-RAY EXAM CHEST 1 VIEW: CPT

## 2025-08-01 PROCEDURE — 76937 US GUIDE VASCULAR ACCESS: CPT

## 2025-08-01 PROCEDURE — 36906 THRMBC/NFS DIALYSIS CIRCUIT: CPT | Performed by: STUDENT IN AN ORGANIZED HEALTH CARE EDUCATION/TRAINING PROGRAM

## 2025-08-01 PROCEDURE — 99223 1ST HOSP IP/OBS HIGH 75: CPT | Performed by: INTERNAL MEDICINE

## 2025-08-01 RX ORDER — HYDROMORPHONE HCL/PF 1 MG/ML
SYRINGE (ML) INJECTION AS NEEDED
Status: DISCONTINUED | OUTPATIENT
Start: 2025-08-01 | End: 2025-08-01

## 2025-08-01 RX ORDER — DEXAMETHASONE SODIUM PHOSPHATE 10 MG/ML
INJECTION, SOLUTION INTRAMUSCULAR; INTRAVENOUS AS NEEDED
Status: DISCONTINUED | OUTPATIENT
Start: 2025-08-01 | End: 2025-08-01

## 2025-08-01 RX ORDER — SODIUM CHLORIDE, SODIUM LACTATE, POTASSIUM CHLORIDE, CALCIUM CHLORIDE 600; 310; 30; 20 MG/100ML; MG/100ML; MG/100ML; MG/100ML
125 INJECTION, SOLUTION INTRAVENOUS CONTINUOUS
Status: DISCONTINUED | OUTPATIENT
Start: 2025-08-01 | End: 2025-08-01

## 2025-08-01 RX ORDER — ALBUTEROL SULFATE 0.83 MG/ML
2.5 SOLUTION RESPIRATORY (INHALATION) ONCE AS NEEDED
Status: DISCONTINUED | OUTPATIENT
Start: 2025-08-01 | End: 2025-08-01 | Stop reason: HOSPADM

## 2025-08-01 RX ORDER — ACETAMINOPHEN 325 MG/1
975 TABLET ORAL EVERY 8 HOURS
Status: DISCONTINUED | OUTPATIENT
Start: 2025-08-01 | End: 2025-08-02 | Stop reason: HOSPADM

## 2025-08-01 RX ORDER — IPRATROPIUM BROMIDE AND ALBUTEROL SULFATE 2.5; .5 MG/3ML; MG/3ML
3 SOLUTION RESPIRATORY (INHALATION)
Status: DISCONTINUED | OUTPATIENT
Start: 2025-08-01 | End: 2025-08-02

## 2025-08-01 RX ORDER — SODIUM CHLORIDE 9 MG/ML
INJECTION, SOLUTION INTRAVENOUS CONTINUOUS PRN
Status: DISCONTINUED | OUTPATIENT
Start: 2025-08-01 | End: 2025-08-01

## 2025-08-01 RX ORDER — PROPOFOL 10 MG/ML
INJECTION, EMULSION INTRAVENOUS CONTINUOUS PRN
Status: DISCONTINUED | OUTPATIENT
Start: 2025-08-01 | End: 2025-08-01

## 2025-08-01 RX ORDER — HYDRALAZINE HYDROCHLORIDE 20 MG/ML
INJECTION INTRAMUSCULAR; INTRAVENOUS AS NEEDED
Status: DISCONTINUED | OUTPATIENT
Start: 2025-08-01 | End: 2025-08-01

## 2025-08-01 RX ORDER — ENOXAPARIN SODIUM 100 MG/ML
40 INJECTION SUBCUTANEOUS DAILY
Status: DISCONTINUED | OUTPATIENT
Start: 2025-08-02 | End: 2025-08-01

## 2025-08-01 RX ORDER — CALCITRIOL 0.25 UG/1
0.75 CAPSULE, LIQUID FILLED ORAL 3 TIMES WEEKLY
Status: DISCONTINUED | OUTPATIENT
Start: 2025-08-01 | End: 2025-08-02 | Stop reason: HOSPADM

## 2025-08-01 RX ORDER — PROPOFOL 10 MG/ML
INJECTION, EMULSION INTRAVENOUS AS NEEDED
Status: DISCONTINUED | OUTPATIENT
Start: 2025-08-01 | End: 2025-08-01

## 2025-08-01 RX ORDER — SODIUM CHLORIDE 9 MG/ML
30 INJECTION, SOLUTION INTRAVENOUS CONTINUOUS
Status: DISCONTINUED | OUTPATIENT
Start: 2025-08-01 | End: 2025-08-01

## 2025-08-01 RX ORDER — LIDOCAINE WITH 8.4% SOD BICARB 0.9%(10ML)
SYRINGE (ML) INJECTION AS NEEDED
Status: COMPLETED | OUTPATIENT
Start: 2025-08-01 | End: 2025-08-01

## 2025-08-01 RX ORDER — HYDROMORPHONE HCL/PF 1 MG/ML
0.5 SYRINGE (ML) INJECTION ONCE
Status: COMPLETED | OUTPATIENT
Start: 2025-08-02 | End: 2025-08-02

## 2025-08-01 RX ORDER — CARVEDILOL 3.12 MG/1
3.12 TABLET ORAL 2 TIMES DAILY WITH MEALS
Status: DISCONTINUED | OUTPATIENT
Start: 2025-08-02 | End: 2025-08-02 | Stop reason: HOSPADM

## 2025-08-01 RX ORDER — HYDROMORPHONE HCL/PF 1 MG/ML
0.5 SYRINGE (ML) INJECTION
Status: DISCONTINUED | OUTPATIENT
Start: 2025-08-01 | End: 2025-08-01 | Stop reason: HOSPADM

## 2025-08-01 RX ORDER — ONDANSETRON 2 MG/ML
4 INJECTION INTRAMUSCULAR; INTRAVENOUS ONCE AS NEEDED
Status: DISCONTINUED | OUTPATIENT
Start: 2025-08-01 | End: 2025-08-01 | Stop reason: HOSPADM

## 2025-08-01 RX ORDER — FENTANYL CITRATE 50 UG/ML
INJECTION, SOLUTION INTRAMUSCULAR; INTRAVENOUS AS NEEDED
Status: DISCONTINUED | OUTPATIENT
Start: 2025-08-01 | End: 2025-08-01

## 2025-08-01 RX ORDER — ONDANSETRON 2 MG/ML
INJECTION INTRAMUSCULAR; INTRAVENOUS AS NEEDED
Status: DISCONTINUED | OUTPATIENT
Start: 2025-08-01 | End: 2025-08-01

## 2025-08-01 RX ORDER — ATORVASTATIN CALCIUM 80 MG/1
80 TABLET, FILM COATED ORAL
Status: DISCONTINUED | OUTPATIENT
Start: 2025-08-02 | End: 2025-08-02 | Stop reason: HOSPADM

## 2025-08-01 RX ORDER — MIDAZOLAM HYDROCHLORIDE 2 MG/2ML
INJECTION, SOLUTION INTRAMUSCULAR; INTRAVENOUS AS NEEDED
Status: DISCONTINUED | OUTPATIENT
Start: 2025-08-01 | End: 2025-08-01

## 2025-08-01 RX ORDER — CALCIUM ACETATE 667 MG/1
1334 CAPSULE ORAL 3 TIMES DAILY
Status: DISCONTINUED | OUTPATIENT
Start: 2025-08-01 | End: 2025-08-02 | Stop reason: HOSPADM

## 2025-08-01 RX ORDER — HEPARIN SODIUM 1000 [USP'U]/ML
INJECTION, SOLUTION INTRAVENOUS; SUBCUTANEOUS AS NEEDED
Status: DISCONTINUED | OUTPATIENT
Start: 2025-08-01 | End: 2025-08-01

## 2025-08-01 RX ORDER — SEVELAMER HYDROCHLORIDE 800 MG/1
800 TABLET, FILM COATED ORAL
Status: DISCONTINUED | OUTPATIENT
Start: 2025-08-02 | End: 2025-08-02 | Stop reason: HOSPADM

## 2025-08-01 RX ORDER — ACETAMINOPHEN 325 MG/1
650 TABLET ORAL EVERY 6 HOURS PRN
Status: DISCONTINUED | OUTPATIENT
Start: 2025-08-01 | End: 2025-08-01

## 2025-08-01 RX ORDER — PROMETHAZINE HYDROCHLORIDE 25 MG/ML
12.5 INJECTION, SOLUTION INTRAMUSCULAR; INTRAVENOUS ONCE AS NEEDED
Status: DISCONTINUED | OUTPATIENT
Start: 2025-08-01 | End: 2025-08-01 | Stop reason: HOSPADM

## 2025-08-01 RX ORDER — OXYCODONE HYDROCHLORIDE 5 MG/1
5 TABLET ORAL EVERY 6 HOURS PRN
Refills: 0 | Status: DISCONTINUED | OUTPATIENT
Start: 2025-08-01 | End: 2025-08-02

## 2025-08-01 RX ORDER — LABETALOL HYDROCHLORIDE 5 MG/ML
INJECTION, SOLUTION INTRAVENOUS AS NEEDED
Status: DISCONTINUED | OUTPATIENT
Start: 2025-08-01 | End: 2025-08-01

## 2025-08-01 RX ORDER — HYDROMORPHONE HCL/PF 1 MG/ML
0.5 SYRINGE (ML) INJECTION EVERY 6 HOURS PRN
Status: DISCONTINUED | OUTPATIENT
Start: 2025-08-01 | End: 2025-08-02

## 2025-08-01 RX ORDER — LIDOCAINE HYDROCHLORIDE 10 MG/ML
INJECTION, SOLUTION EPIDURAL; INFILTRATION; INTRACAUDAL; PERINEURAL AS NEEDED
Status: DISCONTINUED | OUTPATIENT
Start: 2025-08-01 | End: 2025-08-01

## 2025-08-01 RX ORDER — ASPIRIN 81 MG/1
81 TABLET ORAL DAILY
Status: DISCONTINUED | OUTPATIENT
Start: 2025-08-02 | End: 2025-08-02 | Stop reason: HOSPADM

## 2025-08-01 RX ORDER — IODIXANOL 320 MG/ML
300 INJECTION, SOLUTION INTRAVASCULAR
Status: COMPLETED | OUTPATIENT
Start: 2025-08-01 | End: 2025-08-01

## 2025-08-01 RX ORDER — LABETALOL HYDROCHLORIDE 5 MG/ML
5 INJECTION, SOLUTION INTRAVENOUS
Status: DISCONTINUED | OUTPATIENT
Start: 2025-08-01 | End: 2025-08-01 | Stop reason: HOSPADM

## 2025-08-01 RX ORDER — FENTANYL CITRATE/PF 50 MCG/ML
25 SYRINGE (ML) INJECTION
Status: DISCONTINUED | OUTPATIENT
Start: 2025-08-01 | End: 2025-08-01 | Stop reason: HOSPADM

## 2025-08-01 RX ORDER — ISOSORBIDE MONONITRATE 60 MG/1
60 TABLET, EXTENDED RELEASE ORAL DAILY
Status: DISCONTINUED | OUTPATIENT
Start: 2025-08-02 | End: 2025-08-02 | Stop reason: HOSPADM

## 2025-08-01 RX ORDER — METOLAZONE 5 MG/1
10 TABLET ORAL 2 TIMES WEEKLY
Status: DISCONTINUED | OUTPATIENT
Start: 2025-08-04 | End: 2025-08-02 | Stop reason: HOSPADM

## 2025-08-01 RX ADMIN — ALTEPLASE: 2.2 INJECTION, POWDER, LYOPHILIZED, FOR SOLUTION INTRAVENOUS at 13:42

## 2025-08-01 RX ADMIN — HYDROMORPHONE HYDROCHLORIDE 0.5 MG: 1 INJECTION, SOLUTION INTRAMUSCULAR; INTRAVENOUS; SUBCUTANEOUS at 15:03

## 2025-08-01 RX ADMIN — FENTANYL CITRATE 25 MCG: 50 INJECTION INTRAMUSCULAR; INTRAVENOUS at 13:26

## 2025-08-01 RX ADMIN — PROPOFOL 100 MG: 10 INJECTION, EMULSION INTRAVENOUS at 12:18

## 2025-08-01 RX ADMIN — HYDROMORPHONE HYDROCHLORIDE 0.5 MG: 1 INJECTION, SOLUTION INTRAMUSCULAR; INTRAVENOUS; SUBCUTANEOUS at 20:10

## 2025-08-01 RX ADMIN — LABETALOL HYDROCHLORIDE 10 MG: 5 INJECTION, SOLUTION INTRAVENOUS at 15:30

## 2025-08-01 RX ADMIN — HYDROMORPHONE HYDROCHLORIDE 0.5 MG: 1 INJECTION, SOLUTION INTRAMUSCULAR; INTRAVENOUS; SUBCUTANEOUS at 19:27

## 2025-08-01 RX ADMIN — HYDROMORPHONE HYDROCHLORIDE 0.5 MG: 1 INJECTION, SOLUTION INTRAMUSCULAR; INTRAVENOUS; SUBCUTANEOUS at 21:56

## 2025-08-01 RX ADMIN — HYDRALAZINE HYDROCHLORIDE 10 MG: 20 INJECTION INTRAMUSCULAR; INTRAVENOUS at 14:31

## 2025-08-01 RX ADMIN — HEPARIN SODIUM 3000 UNITS: 1000 INJECTION, SOLUTION INTRAVENOUS; SUBCUTANEOUS at 15:55

## 2025-08-01 RX ADMIN — ALTEPLASE 3 MG: 2.2 INJECTION, POWDER, LYOPHILIZED, FOR SOLUTION INTRAVENOUS at 14:02

## 2025-08-01 RX ADMIN — ONDANSETRON 4 MG: 2 INJECTION INTRAMUSCULAR; INTRAVENOUS at 12:59

## 2025-08-01 RX ADMIN — IPRATROPIUM BROMIDE AND ALBUTEROL SULFATE 3 ML: 2.5; .5 SOLUTION RESPIRATORY (INHALATION) at 17:58

## 2025-08-01 RX ADMIN — ALTEPLASE: 2.2 INJECTION, POWDER, LYOPHILIZED, FOR SOLUTION INTRAVENOUS at 13:40

## 2025-08-01 RX ADMIN — LABETALOL HYDROCHLORIDE 10 MG: 5 INJECTION, SOLUTION INTRAVENOUS at 12:48

## 2025-08-01 RX ADMIN — Medication 10 ML: at 12:36

## 2025-08-01 RX ADMIN — FENTANYL CITRATE 50 MCG: 50 INJECTION INTRAMUSCULAR; INTRAVENOUS at 12:18

## 2025-08-01 RX ADMIN — LABETALOL HYDROCHLORIDE 10 MG: 5 INJECTION, SOLUTION INTRAVENOUS at 13:26

## 2025-08-01 RX ADMIN — SODIUM CHLORIDE: 0.9 INJECTION, SOLUTION INTRAVENOUS at 12:05

## 2025-08-01 RX ADMIN — CALCIUM ACETATE 1334 MG: 667 CAPSULE ORAL at 20:42

## 2025-08-01 RX ADMIN — IODIXANOL 200 ML: 320 INJECTION, SOLUTION INTRAVASCULAR at 16:36

## 2025-08-01 RX ADMIN — LIDOCAINE HYDROCHLORIDE 50 MG: 10 INJECTION, SOLUTION EPIDURAL; INFILTRATION; INTRACAUDAL; PERINEURAL at 12:18

## 2025-08-01 RX ADMIN — HEPARIN SODIUM 5000 UNITS: 1000 INJECTION, SOLUTION INTRAVENOUS; SUBCUTANEOUS at 12:57

## 2025-08-01 RX ADMIN — FENTANYL CITRATE 25 MCG: 50 INJECTION INTRAMUSCULAR; INTRAVENOUS at 18:54

## 2025-08-01 RX ADMIN — HEPARIN SODIUM 2000 UNITS: 1000 INJECTION, SOLUTION INTRAVENOUS; SUBCUTANEOUS at 13:55

## 2025-08-01 RX ADMIN — LABETALOL HYDROCHLORIDE 10 MG: 5 INJECTION, SOLUTION INTRAVENOUS at 15:19

## 2025-08-01 RX ADMIN — PHENYLEPHRINE HYDROCHLORIDE 3 DROP: 1 SPRAY NASAL at 12:29

## 2025-08-01 RX ADMIN — MIDAZOLAM 2 MG: 1 INJECTION INTRAMUSCULAR; INTRAVENOUS at 12:10

## 2025-08-01 RX ADMIN — FENTANYL CITRATE 25 MCG: 50 INJECTION INTRAMUSCULAR; INTRAVENOUS at 14:02

## 2025-08-01 RX ADMIN — LABETALOL HYDROCHLORIDE 5 MG: 5 INJECTION, SOLUTION INTRAVENOUS at 18:12

## 2025-08-01 RX ADMIN — HYDRALAZINE HYDROCHLORIDE 10 MG: 20 INJECTION INTRAMUSCULAR; INTRAVENOUS at 14:02

## 2025-08-01 RX ADMIN — DEXAMETHASONE SODIUM PHOSPHATE 10 MG: 10 INJECTION, SOLUTION INTRAMUSCULAR; INTRAVENOUS at 12:18

## 2025-08-01 RX ADMIN — OXYCODONE HYDROCHLORIDE 5 MG: 5 TABLET ORAL at 20:43

## 2025-08-01 RX ADMIN — CALCITRIOL 0.75 MCG: 0.25 CAPSULE ORAL at 20:42

## 2025-08-01 RX ADMIN — PROPOFOL 100 MCG/KG/MIN: 10 INJECTION, EMULSION INTRAVENOUS at 12:18

## 2025-08-01 RX ADMIN — FENTANYL CITRATE 25 MCG: 50 INJECTION INTRAMUSCULAR; INTRAVENOUS at 19:02

## 2025-08-02 ENCOUNTER — APPOINTMENT (OUTPATIENT)
Dept: DIALYSIS | Facility: HOSPITAL | Age: 45
End: 2025-08-02
Payer: COMMERCIAL

## 2025-08-02 VITALS
HEART RATE: 86 BPM | DIASTOLIC BLOOD PRESSURE: 78 MMHG | HEIGHT: 67 IN | SYSTOLIC BLOOD PRESSURE: 127 MMHG | OXYGEN SATURATION: 93 % | TEMPERATURE: 97.5 F | RESPIRATION RATE: 18 BRPM | BODY MASS INDEX: 49.44 KG/M2 | WEIGHT: 315 LBS

## 2025-08-02 PROBLEM — R09.02 HYPOXIA: Status: RESOLVED | Noted: 2021-12-04 | Resolved: 2025-08-02

## 2025-08-02 LAB
ANION GAP SERPL CALCULATED.3IONS-SCNC: 18 MMOL/L (ref 4–13)
BUN SERPL-MCNC: 60 MG/DL (ref 5–25)
CALCIUM SERPL-MCNC: 9.1 MG/DL (ref 8.4–10.2)
CHLORIDE SERPL-SCNC: 87 MMOL/L (ref 96–108)
CO2 SERPL-SCNC: 23 MMOL/L (ref 21–32)
CREAT SERPL-MCNC: 8.23 MG/DL (ref 0.6–1.3)
ERYTHROCYTE [DISTWIDTH] IN BLOOD BY AUTOMATED COUNT: 14 % (ref 11.6–15.1)
GFR SERPL CREATININE-BSD FRML MDRD: 7 ML/MIN/1.73SQ M
GLUCOSE SERPL-MCNC: 330 MG/DL (ref 65–140)
HCT VFR BLD AUTO: 30.9 % (ref 36.5–49.3)
HGB BLD-MCNC: 10.3 G/DL (ref 12–17)
MCH RBC QN AUTO: 31.1 PG (ref 26.8–34.3)
MCHC RBC AUTO-ENTMCNC: 33.3 G/DL (ref 31.4–37.4)
MCV RBC AUTO: 93 FL (ref 82–98)
PLATELET # BLD AUTO: 297 THOUSANDS/UL (ref 149–390)
PMV BLD AUTO: 9.3 FL (ref 8.9–12.7)
POTASSIUM SERPL-SCNC: 4.9 MMOL/L (ref 3.5–5.3)
RBC # BLD AUTO: 3.31 MILLION/UL (ref 3.88–5.62)
SODIUM SERPL-SCNC: 128 MMOL/L (ref 135–147)
WBC # BLD AUTO: 11.5 THOUSAND/UL (ref 4.31–10.16)

## 2025-08-02 PROCEDURE — 99239 HOSP IP/OBS DSCHRG MGMT >30: CPT | Performed by: FAMILY MEDICINE

## 2025-08-02 PROCEDURE — G0257 UNSCHED DIALYSIS ESRD PT HOS: HCPCS

## 2025-08-02 PROCEDURE — 90999 UNLISTED DIALYSIS PROCEDURE: CPT | Performed by: INTERNAL MEDICINE

## 2025-08-02 PROCEDURE — 85027 COMPLETE CBC AUTOMATED: CPT | Performed by: INTERNAL MEDICINE

## 2025-08-02 PROCEDURE — 99214 OFFICE O/P EST MOD 30 MIN: CPT | Performed by: INTERNAL MEDICINE

## 2025-08-02 PROCEDURE — 80048 BASIC METABOLIC PNL TOTAL CA: CPT | Performed by: INTERNAL MEDICINE

## 2025-08-02 RX ORDER — OXYCODONE AND ACETAMINOPHEN 5; 325 MG/1; MG/1
1 TABLET ORAL EVERY 8 HOURS PRN
Qty: 15 TABLET | Refills: 0 | Status: SHIPPED | OUTPATIENT
Start: 2025-08-02 | End: 2025-08-12

## 2025-08-02 RX ORDER — OXYCODONE HYDROCHLORIDE 10 MG/1
10 TABLET ORAL EVERY 4 HOURS PRN
Refills: 0 | Status: DISCONTINUED | OUTPATIENT
Start: 2025-08-02 | End: 2025-08-02 | Stop reason: HOSPADM

## 2025-08-02 RX ORDER — OXYCODONE HYDROCHLORIDE 5 MG/1
5 TABLET ORAL EVERY 4 HOURS PRN
Refills: 0 | Status: DISCONTINUED | OUTPATIENT
Start: 2025-08-02 | End: 2025-08-02 | Stop reason: HOSPADM

## 2025-08-02 RX ORDER — HYDROMORPHONE HCL/PF 1 MG/ML
0.5 SYRINGE (ML) INJECTION
Status: DISCONTINUED | OUTPATIENT
Start: 2025-08-02 | End: 2025-08-02 | Stop reason: HOSPADM

## 2025-08-02 RX ORDER — HEPARIN SODIUM,PORCINE/PF 10 UNIT/ML
2.5 SYRINGE (ML) INTRAVENOUS ONCE
Status: DISCONTINUED | OUTPATIENT
Start: 2025-08-02 | End: 2025-08-02

## 2025-08-02 RX ORDER — HEPARIN SODIUM 1000 [USP'U]/ML
1000 INJECTION, SOLUTION INTRAVENOUS; SUBCUTANEOUS ONCE AS NEEDED
Status: COMPLETED | OUTPATIENT
Start: 2025-08-02 | End: 2025-08-02

## 2025-08-02 RX ORDER — HEPARIN SODIUM,PORCINE/PF 10 UNIT/ML
25 SYRINGE (ML) INTRAVENOUS ONCE
Status: DISCONTINUED | OUTPATIENT
Start: 2025-08-02 | End: 2025-08-02 | Stop reason: HOSPADM

## 2025-08-02 RX ADMIN — CARVEDILOL 3.12 MG: 3.12 TABLET, FILM COATED ORAL at 13:45

## 2025-08-02 RX ADMIN — ISOSORBIDE MONONITRATE 60 MG: 60 TABLET, EXTENDED RELEASE ORAL at 13:46

## 2025-08-02 RX ADMIN — ACETAMINOPHEN 975 MG: 325 TABLET ORAL at 16:38

## 2025-08-02 RX ADMIN — ACETAMINOPHEN 975 MG: 325 TABLET ORAL at 00:20

## 2025-08-02 RX ADMIN — HYDROMORPHONE HYDROCHLORIDE 0.5 MG: 1 INJECTION, SOLUTION INTRAMUSCULAR; INTRAVENOUS; SUBCUTANEOUS at 14:30

## 2025-08-02 RX ADMIN — ATORVASTATIN CALCIUM 80 MG: 80 TABLET, FILM COATED ORAL at 16:37

## 2025-08-02 RX ADMIN — Medication 2000 UNITS: at 13:46

## 2025-08-02 RX ADMIN — HEPARIN SODIUM 1000 UNITS: 1000 INJECTION, SOLUTION INTRAVENOUS; SUBCUTANEOUS at 17:04

## 2025-08-02 RX ADMIN — OXYCODONE HYDROCHLORIDE 10 MG: 10 TABLET ORAL at 13:45

## 2025-08-02 RX ADMIN — CARVEDILOL 3.12 MG: 3.12 TABLET, FILM COATED ORAL at 16:38

## 2025-08-02 RX ADMIN — CALCIUM ACETATE 1334 MG: 667 CAPSULE ORAL at 16:37

## 2025-08-02 RX ADMIN — HYDROMORPHONE HYDROCHLORIDE 0.5 MG: 1 INJECTION, SOLUTION INTRAMUSCULAR; INTRAVENOUS; SUBCUTANEOUS at 09:42

## 2025-08-02 RX ADMIN — HYDROMORPHONE HYDROCHLORIDE 0.5 MG: 1 INJECTION, SOLUTION INTRAMUSCULAR; INTRAVENOUS; SUBCUTANEOUS at 04:17

## 2025-08-02 RX ADMIN — CALCIUM ACETATE 1334 MG: 667 CAPSULE ORAL at 13:45

## 2025-08-02 RX ADMIN — ASPIRIN 81 MG: 81 TABLET, COATED ORAL at 13:46

## 2025-08-02 RX ADMIN — SEVELAMER HYDROCHLORIDE 800 MG: 800 TABLET ORAL at 13:46

## 2025-08-02 RX ADMIN — APIXABAN 5 MG: 5 TABLET, FILM COATED ORAL at 13:45

## 2025-08-02 RX ADMIN — ACETAMINOPHEN 975 MG: 325 TABLET ORAL at 07:43

## 2025-08-02 RX ADMIN — SEVELAMER HYDROCHLORIDE 800 MG: 800 TABLET ORAL at 16:37

## 2025-08-02 RX ADMIN — OXYCODONE HYDROCHLORIDE 10 MG: 10 TABLET ORAL at 03:14

## 2025-08-02 RX ADMIN — HYDROMORPHONE HYDROCHLORIDE 0.5 MG: 1 INJECTION, SOLUTION INTRAMUSCULAR; INTRAVENOUS; SUBCUTANEOUS at 00:20

## 2025-08-02 RX ADMIN — APIXABAN 5 MG: 5 TABLET, FILM COATED ORAL at 16:38

## 2025-08-02 RX ADMIN — OXYCODONE HYDROCHLORIDE 10 MG: 10 TABLET ORAL at 07:43

## 2025-08-04 ENCOUNTER — TRANSITIONAL CARE MANAGEMENT (OUTPATIENT)
Dept: FAMILY MEDICINE CLINIC | Facility: CLINIC | Age: 45
End: 2025-08-04

## 2025-08-04 ENCOUNTER — VBI (OUTPATIENT)
Dept: ADMINISTRATIVE | Facility: OTHER | Age: 45
End: 2025-08-04

## 2025-08-06 ENCOUNTER — TRANSCRIBE ORDERS (OUTPATIENT)
Dept: RADIOLOGY | Facility: HOSPITAL | Age: 45
End: 2025-08-06

## 2025-08-06 DIAGNOSIS — N18.9 CHRONIC KIDNEY DISEASE, UNSPECIFIED CKD STAGE: Primary | ICD-10-CM

## 2025-08-06 DIAGNOSIS — N18.6 ESRD (END STAGE RENAL DISEASE) (HCC): Primary | ICD-10-CM

## 2025-08-07 ENCOUNTER — PREP FOR PROCEDURE (OUTPATIENT)
Dept: INTERVENTIONAL RADIOLOGY/VASCULAR | Facility: CLINIC | Age: 45
End: 2025-08-07

## 2025-08-07 DIAGNOSIS — N18.6 ESRD (END STAGE RENAL DISEASE) (HCC): Primary | ICD-10-CM

## 2025-08-08 ENCOUNTER — HOSPITAL ENCOUNTER (OUTPATIENT)
Dept: RADIOLOGY | Facility: HOSPITAL | Age: 45
Discharge: HOME | End: 2025-08-08
Attending: RADIOLOGY
Payer: COMMERCIAL

## 2025-08-08 DIAGNOSIS — N18.6 ESRD (END STAGE RENAL DISEASE) (HCC): ICD-10-CM

## 2025-08-08 PROCEDURE — C1725 CATH, TRANSLUMIN NON-LASER: HCPCS

## 2025-08-08 PROCEDURE — 36581 REPLACE TUNNELED CV CATH: CPT

## 2025-08-08 PROCEDURE — C1769 GUIDE WIRE: HCPCS

## 2025-08-08 PROCEDURE — 77001 FLUOROGUIDE FOR VEIN DEVICE: CPT

## 2025-08-08 PROCEDURE — 37799 UNLISTED PX VASCULAR SURGERY: CPT

## 2025-08-08 PROCEDURE — C1750 CATH, HEMODIALYSIS,LONG-TERM: HCPCS

## 2025-08-08 PROCEDURE — 36595 MECH REMOV TUNNELED CV CATH: CPT

## 2025-08-08 RX ORDER — IODIXANOL 320 MG/ML
50 INJECTION, SOLUTION INTRAVASCULAR
Status: DISCONTINUED | OUTPATIENT
Start: 2025-08-08 | End: 2025-08-08 | Stop reason: CLARIF

## 2025-08-08 RX ORDER — VANCOMYCIN HYDROCHLORIDE 1 G/200ML
10 INJECTION, SOLUTION INTRAVENOUS ONCE
Status: COMPLETED | OUTPATIENT
Start: 2025-08-08 | End: 2025-08-08

## 2025-08-08 RX ADMIN — Medication 1000 MG: at 14:45

## 2025-08-08 RX ADMIN — IOHEXOL 15 ML: 350 INJECTION, SOLUTION INTRAVENOUS at 15:38

## 2025-08-08 RX ADMIN — Medication 20 ML: at 15:23

## (undated) DEVICE — SPONGE STICK WITH PVP-I: Brand: KENDALL

## (undated) DEVICE — COBAN 4 IN STERILE

## (undated) DEVICE — EXOFIN PRECISION PEN HIGH VISCOSITY TOPICAL SKIN ADHESIVE: Brand: EXOFIN PRECISION PEN, 1G

## (undated) DEVICE — RAZOR STERILE

## (undated) DEVICE — ANTIBACTERIAL UNDYED BRAIDED (POLYGLACTIN 910), SYNTHETIC ABSORBABLE SUTURE: Brand: COATED VICRYL

## (undated) DEVICE — Device

## (undated) DEVICE — COBAN 4 IN UNSTERILE

## (undated) DEVICE — SUT SILK 3-0 18 IN A184H

## (undated) DEVICE — HEMOCLIP CARTRIDGE MED

## (undated) DEVICE — SUT MONOCRYL 4-0 PS-2 18 IN Y496G

## (undated) DEVICE — SUPER SPONGES,MEDIUM: Brand: KERLIX

## (undated) DEVICE — BULB SYRINGE,IRRIGATION WITH PROTECTIVE CAP: Brand: DOVER

## (undated) DEVICE — NEEDLE 25G X 1 1/2

## (undated) DEVICE — MEDI-VAC YANK SUCT HNDL W/TPRD BULBOUS TIP: Brand: CARDINAL HEALTH

## (undated) DEVICE — PETRI DISH STERILE

## (undated) DEVICE — ABDOMINAL PAD: Brand: DERMACEA

## (undated) DEVICE — LEGGINGS: Brand: CONVERTORS

## (undated) DEVICE — GLOVE INDICATOR PI UNDERGLOVE SZ 7.5 BLUE

## (undated) DEVICE — ACE WRAP 6 IN UNSTERILE

## (undated) DEVICE — SYRINGE 10ML LL CONTROL TOP

## (undated) DEVICE — SPECIMEN CONTAINER STERILE PEEL PACK

## (undated) DEVICE — HEMOCLIP CARTRIDGE SM

## (undated) DEVICE — 1/2 FORCE SURGICAL SPRING CLIP: Brand: STEALTH® SPRING CLIP

## (undated) DEVICE — TIBURON SPLIT SHEET: Brand: CONVERTORS

## (undated) DEVICE — BETHLEHEM UNIVERSAL OUTPATIENT: Brand: CARDINAL HEALTH

## (undated) DEVICE — TIBURON TRANSVERSE LAPAROTOMY SHEET: Brand: CONVERTORS

## (undated) DEVICE — TONGUE DEPRESSOR STERILE

## (undated) DEVICE — GLOVE SRG BIOGEL 7

## (undated) DEVICE — KERLIX BANDAGE ROLL: Brand: KERLIX

## (undated) DEVICE — PAD GROUNDING ADULT

## (undated) DEVICE — 3M™ TEGADERM™ CHG DRESSING 25/CARTON 4 CARTONS/CASE 1659: Brand: TEGADERM™

## (undated) DEVICE — CULTURE TUBE AEROBIC

## (undated) DEVICE — DRAPE SHEET THREE QUARTER

## (undated) DEVICE — SPONGE LAP 18 X 18 IN

## (undated) DEVICE — 3000CC GUARDIAN II: Brand: GUARDIAN

## (undated) DEVICE — GLOVE SRG BIOGEL ORTHOPEDIC 7.5

## (undated) DEVICE — BETHLEHEM UNIVERSAL MINOR GEN: Brand: CARDINAL HEALTH

## (undated) DEVICE — REM POLYHESIVE ADULT PATIENT RETURN ELECTRODE: Brand: VALLEYLAB

## (undated) DEVICE — GLOVE SRG BIOGEL ECLIPSE 7.5

## (undated) DEVICE — LAVH/LAPAROSCOPY DRAPE: Brand: CONVERTORS

## (undated) DEVICE — VESSEL CANNULA

## (undated) DEVICE — SUT SILK 4-0 18 IN A183H

## (undated) DEVICE — STERILE DOUBLE BASIN SET PACK: Brand: CARDINAL HEALTH

## (undated) DEVICE — HYDROGEN PEROXIDE 3 PCT 16 OZ

## (undated) DEVICE — POV-IOD SOLUTION 4OZ BT

## (undated) DEVICE — GLOVE INDICATOR PI UNDERGLOVE SZ 8 BLUE

## (undated) DEVICE — DRESSING XEROFORM 5 X 9

## (undated) DEVICE — 3M™ IOBAN™ 2 ANTIMICROBIAL INCISE DRAPE 6650EZ: Brand: IOBAN™ 2

## (undated) DEVICE — 3M™ TEGADERM™ TRANSPARENT FILM DRESSING FRAME STYLE, 1628, 6 IN X 8 IN (15 CM X 20 CM), 10/CT 8CT/CASE: Brand: 3M™ TEGADERM™

## (undated) DEVICE — GLOVE INDICATOR PI UNDERGLOVE SZ 9 BLUE

## (undated) DEVICE — GLOVE SRG BIOGEL 9

## (undated) DEVICE — SUT PROLENE 6-0 BV-1/BV-1 24 IN 8305H

## (undated) DEVICE — GLOVE SRG BIOGEL 7.5

## (undated) DEVICE — ADHESIVE SKIN HIGH VISCOSITY EXOFIN 1ML

## (undated) DEVICE — PACK MAJOR ORTHO W/SPLITS PBDS

## (undated) DEVICE — HYDROPHILIC WOUND DRESSING WITH ZINC PLUS VITAMINS A AND B6.: Brand: DERMAGRAN®-B

## (undated) DEVICE — DRAPE UTILITY

## (undated) DEVICE — DECANTER: Brand: UNBRANDED

## (undated) DEVICE — SUT VICRYL 3-0 SH 27 IN J416H

## (undated) DEVICE — GLOVE SRG BIOGEL 8

## (undated) DEVICE — 3M™ STERI-STRIP™ REINFORCED ADHESIVE SKIN CLOSURES, R1547, 1/2 IN X 4 IN (12 MM X 100 MM), 6 STRIPS/ENVELOPE: Brand: 3M™ STERI-STRIP™

## (undated) DEVICE — 3M™ TEGADERM™ TRANSPARENT FILM DRESSING FRAME STYLE, 1626W, 4 IN X 4-3/4 IN (10 CM X 12 CM), 50/CT 4CT/CASE: Brand: 3M™ TEGADERM™

## (undated) DEVICE — DEVON SURGICAL SITE MINI-MARKER PREP RESISTANT: Brand: DEVON

## (undated) DEVICE — CULTURE TUBE ANAEROBIC

## (undated) DEVICE — INTENDED FOR TISSUE SEPARATION, AND OTHER PROCEDURES THAT REQUIRE A SHARP SURGICAL BLADE TO PUNCTURE OR CUT.: Brand: BARD-PARKER ® CARBON RIB-BACK BLADES

## (undated) DEVICE — LABEL STERILE (RSC) (2-SENSOR CAINE 2-LIDOCAINE 2-HEPARIN)

## (undated) DEVICE — PLUMEPEN PRO 10FT

## (undated) DEVICE — SPONGE GAUZE 4 X 9

## (undated) DEVICE — NEPTUNE E-SEP SMOKE EVACUATION PENCIL, COATED, 70MM BLADE, PUSH BUTTON SWITCH: Brand: NEPTUNE E-SEP

## (undated) DEVICE — SUT MONOCRYL 4-0 PS-2 27 IN Y426H

## (undated) DEVICE — INTENDED FOR TISSUE SEPARATION, AND OTHER PROCEDURES THAT REQUIRE A SHARP SURGICAL BLADE TO PUNCTURE OR CUT.: Brand: BARD-PARKER SAFETY BLADES SIZE 15, STERILE

## (undated) DEVICE — LAPAROTOMY SPONGE - RF AND X-RAY DETECTABLE PRE-WASHED: Brand: SITUATE

## (undated) DEVICE — SUT SILK 2-0 18 IN A185H

## (undated) DEVICE — INSULATED BLADE ELECTRODE: Brand: EDGE

## (undated) DEVICE — GAUZE SPONGES,USP TYPE VII GAUZE, 12 PLY: Brand: CURITY

## (undated) DEVICE — STRL BETHLEHEM A V FISTULA PK: Brand: CARDINAL HEALTH

## (undated) DEVICE — CHLORAPREP HI-LITE 26ML ORANGE

## (undated) DEVICE — UNDER BUTTOCKS DRAPE: Brand: CONVERTORS

## (undated) DEVICE — BETHL CAROTID ENDARTERECTOMY: Brand: CARDINAL HEALTH

## (undated) DEVICE — HEMOSTATIC MATRIX SURGIFLO 8ML W/THROMBIN

## (undated) DEVICE — GLOVE INDICATOR PI UNDERGLOVE SZ 8.5 BLUE

## (undated) DEVICE — PENROSE DRAIN, 18 X 3 8: Brand: CARDINAL HEALTH

## (undated) DEVICE — GROUNDING PAD UNIVERSAL SLW

## (undated) DEVICE — SPONGE GAUZE 4 X 8 12 PLY STRL LF

## (undated) DEVICE — ASTOUND IMPERVIOUS SURGICAL GOWN: Brand: CONVERTORS

## (undated) DEVICE — SCD SEQUENTIAL COMPRESSION COMFORT SLEEVE MEDIUM KNEE LENGTH: Brand: KENDALL SCD

## (undated) DEVICE — SUT SILK 0 30 IN A306H

## (undated) DEVICE — ASTOUND STANDARD SURGICAL GOWN, XXL: Brand: CONVERTORS

## (undated) DEVICE — PACK GENERAL LF

## (undated) DEVICE — SURGICEL 4 X 8

## (undated) DEVICE — ULTRASOUND GEL STERILE FOIL PK

## (undated) DEVICE — VAC GRANUFOAM DRESSING X-LARGE FEATURING SENSATRAC TECHNOLOGY: Brand: V.A.C.® GRANUFOAM™

## (undated) DEVICE — PREMIUM DRY TRAY LF: Brand: MEDLINE INDUSTRIES, INC.

## (undated) DEVICE — DERMATOME BLADES: Brand: DERMATOME

## (undated) DEVICE — IODOFORM PACKING STRIP: Brand: CURITY

## (undated) DEVICE — PROXIMATE PLUS MD MULTI-DIRECTIONAL RELEASE SKIN STAPLERS CONTAINS 35 STAINLESS STEEL STAPLES APPROXIMATE CLOSED DIMENSIONS: 6.9MM X 3.9MM WIDE: Brand: PROXIMATE

## (undated) DEVICE — ELECTRODE BLADE MOD E-Z CLEAN  2.75IN 7CM -0012AM

## (undated) DEVICE — TIBURON HAND DRAPE: Brand: CONVERTORS

## (undated) DEVICE — GAUZE SPONGES,16 PLY: Brand: CURITY

## (undated) DEVICE — SURGICEL 2 X 3

## (undated) DEVICE — VAC CANISTER 500ML

## (undated) DEVICE — BASIC DOUBLE BASIN 2-LF: Brand: MEDLINE INDUSTRIES, INC.

## (undated) DEVICE — DRESSING MEPILEX BORDER 4 X 8 IN

## (undated) DEVICE — VAC WHITEFOAM DRESSING LARGE: Brand: V.A.C. WHITEFOAM™

## (undated) DEVICE — AAMI LEVEL 4 POLY-REINFORCED SURGICAL GOWN, X-LARGE, STERILE, SET-IN: Brand: CONVERTORS